# Patient Record
Sex: MALE | Race: WHITE | HISPANIC OR LATINO | Employment: OTHER | URBAN - METROPOLITAN AREA
[De-identification: names, ages, dates, MRNs, and addresses within clinical notes are randomized per-mention and may not be internally consistent; named-entity substitution may affect disease eponyms.]

---

## 2017-05-16 ENCOUNTER — TELEPHONE (OUTPATIENT)
Dept: FAMILY MEDICINE | Facility: CLINIC | Age: 67
End: 2017-05-16

## 2017-05-31 RX ORDER — FLUTICASONE PROPIONATE 50 MCG
SPRAY, SUSPENSION (ML) NASAL
Qty: 3 BOTTLE | Refills: 3 | Status: ON HOLD | OUTPATIENT
Start: 2017-05-31 | End: 2022-01-14 | Stop reason: HOSPADM

## 2017-05-31 RX ORDER — METFORMIN HYDROCHLORIDE 500 MG/1
1 TABLET, EXTENDED RELEASE ORAL DAILY
COMMUNITY
Start: 2017-03-16 | End: 2018-03-22 | Stop reason: SDUPTHER

## 2017-05-31 RX ORDER — ASPIRIN 81 MG/1
81 TABLET ORAL DAILY
COMMUNITY
End: 2022-06-10

## 2017-07-03 ENCOUNTER — OFFICE VISIT (OUTPATIENT)
Dept: FAMILY MEDICINE | Facility: CLINIC | Age: 67
End: 2017-07-03
Payer: MEDICARE

## 2017-07-03 ENCOUNTER — TELEPHONE (OUTPATIENT)
Dept: FAMILY MEDICINE | Facility: CLINIC | Age: 67
End: 2017-07-03

## 2017-07-03 VITALS
BODY MASS INDEX: 29.57 KG/M2 | SYSTOLIC BLOOD PRESSURE: 118 MMHG | WEIGHT: 184 LBS | DIASTOLIC BLOOD PRESSURE: 74 MMHG | HEIGHT: 66 IN | HEART RATE: 59 BPM

## 2017-07-03 DIAGNOSIS — M25.561 ACUTE PAIN OF RIGHT KNEE: Primary | ICD-10-CM

## 2017-07-03 PROBLEM — Z78.9 NON-SMOKER: Status: ACTIVE | Noted: 2017-07-03

## 2017-07-03 PROBLEM — I48.0 PAROXYSMAL ATRIAL FIBRILLATION: Status: ACTIVE | Noted: 2017-07-03

## 2017-07-03 PROBLEM — E11.9 TYPE 2 DIABETES MELLITUS: Status: ACTIVE | Noted: 2017-07-03

## 2017-07-03 PROBLEM — E66.3 OVERWEIGHT: Status: ACTIVE | Noted: 2017-07-03

## 2017-07-03 PROBLEM — D22.9 MELANOCYTIC NEVUS: Status: ACTIVE | Noted: 2017-07-03

## 2017-07-03 PROCEDURE — 96372 THER/PROPH/DIAG INJ SC/IM: CPT | Mod: ,,, | Performed by: INTERNAL MEDICINE

## 2017-07-03 PROCEDURE — 99213 OFFICE O/P EST LOW 20 MIN: CPT | Mod: 25,,, | Performed by: INTERNAL MEDICINE

## 2017-07-03 RX ORDER — DEXAMETHASONE SODIUM PHOSPHATE 4 MG/ML
4 INJECTION, SOLUTION INTRA-ARTICULAR; INTRALESIONAL; INTRAMUSCULAR; INTRAVENOUS; SOFT TISSUE
Status: COMPLETED | OUTPATIENT
Start: 2017-07-03 | End: 2017-07-03

## 2017-07-03 RX ORDER — LIDOCAINE HYDROCHLORIDE 10 MG/ML
1 INJECTION, SOLUTION EPIDURAL; INFILTRATION; INTRACAUDAL; PERINEURAL
Status: COMPLETED | OUTPATIENT
Start: 2017-07-03 | End: 2017-07-03

## 2017-07-03 RX ORDER — NATEGLINIDE 60 MG/1
60 TABLET ORAL
COMMUNITY
End: 2018-05-05 | Stop reason: SDUPTHER

## 2017-07-03 RX ADMIN — LIDOCAINE HYDROCHLORIDE 10 MG: 10 INJECTION, SOLUTION EPIDURAL; INFILTRATION; INTRACAUDAL; PERINEURAL at 11:07

## 2017-07-03 RX ADMIN — DEXAMETHASONE SODIUM PHOSPHATE 4 MG: 4 INJECTION, SOLUTION INTRA-ARTICULAR; INTRALESIONAL; INTRAMUSCULAR; INTRAVENOUS; SOFT TISSUE at 11:07

## 2017-07-03 NOTE — TELEPHONE ENCOUNTER
----- Message from Maico Patterson MD sent at 7/3/2017  4:47 PM CDT -----  Notify patient that x-ray of right knee does not show any evidence of significant arthritis, fracture or dislocation. How is his knee doing after the injection?

## 2017-07-03 NOTE — PROGRESS NOTES
"Subjective:       Patient ID: Nicolás Batres is a 66 y.o. male.    Chief Complaint: Knee Pain ([id number] )    Knee Pain    The incident occurred more than 1 week ago. There was no injury mechanism. The pain is present in the right leg. The quality of the pain is described as shooting and stabbing. The pain is at a severity of 6/10. The pain is moderate. The pain has been constant since onset. Associated symptoms include an inability to bear weight. Pertinent negatives include no numbness.       Past Medical History:   Diagnosis Date    Diabetes mellitus type I     Hypertension      Social History     Social History    Marital status:      Spouse name: N/A    Number of children: N/A    Years of education: N/A     Occupational History    Not on file.     Social History Main Topics    Smoking status: Never Smoker    Smokeless tobacco: Never Used    Alcohol use Yes    Drug use: No    Sexual activity: Yes     Partners: Female     Other Topics Concern    Not on file     Social History Narrative    No narrative on file     History reviewed. No pertinent surgical history.  Family History   Problem Relation Age of Onset    No Known Problems Mother     Diabetes Father     Heart disease Father     Hypertension Father        Review of Systems   Constitutional: Negative for activity change and appetite change.   Respiratory: Negative for apnea, choking and chest tightness.    Cardiovascular: Negative for chest pain and leg swelling.   Gastrointestinal: Negative for abdominal distention.   Genitourinary: Negative for difficulty urinating, dysuria and enuresis.   Musculoskeletal: Positive for arthralgias (rt knee pain medially). Negative for joint swelling.   Neurological: Negative for dizziness, tremors and numbness.       Objective:     Vitals:    07/03/17 1054   BP: 118/74   Pulse: (!) 59   Weight: 83.5 kg (184 lb)   Height: 5' 6" (1.676 m)     Physical Exam   Constitutional: He appears well-developed and " well-nourished. No distress.   HENT:   Head: Normocephalic and atraumatic.   Cardiovascular: Normal rate and regular rhythm.    Pulmonary/Chest: Effort normal and breath sounds normal.   Abdominal: Soft. Normal appearance. There is no tenderness.   Musculoskeletal:        Right shoulder: He exhibits tenderness, bony tenderness and pain. He exhibits no swelling, no effusion and no deformity.        Legs:    there is no evidence of infection, swelling or cellulitis in the joint. Patient does not have a history of gout. Patient has been advised that the steroid injection may raise his blood sugars and he needs to be careful.  Assessment:       1. Acute pain of right knee         Plan:       Knee Arthrocentesis with Injection Procedure Note    Pre-operative Diagnosis: right knee    Post-operative Diagnosis: same    Indications: Unexplained monoarthritis    Anesthesia: Lidocaine 1% without epinephrine without added sodium bicarbonate    Procedure Details     Verbal consent was obtained for the procedure. The joint was prepped with alcoholBetadine and a small wheel of anesthetic was injected into the subcutaneous tissue. A 23 gauge needle was medial  approach. 0.0 ml of fluid was removed from the joint .( No fluid )5 ml 1% lidocaine and 5 ml of dexamethasone was then injected into the joint through the same needle. The needle was removed and the area cleansed and dressed.    Complications:  None; patient tolerated the procedure well.

## 2017-07-03 NOTE — PATIENT INSTRUCTIONS
Knee Pain  Knee pain is very common. Its especially common in active people who put a lot of pressure on their knees, like runners. It affects women more often than men.  Your kneecap (patella) is a thick, round bone. It covers and protects the front portion of your knee joint. It moves along a groove in your thighbone (femur) as part of the patellofemoral joint. A layer of cartilage surrounds the underside of your kneecap. This layer protects it from grinding against your femur.  When this cartilage softens and breaks down, it can cause knee pain. This is partly because of repetitive stress. The stress irritates the lining of the joint. This causes pain in the underlying bone.  What causes knee pain?  Many things can cause knee pain. You may have more than one cause. Some of these include:  · Overuse of the knee joint  · The kneecap doesnt line up with the tissue around it  · Damage to small nerves in the area  · Damage to the ligament-like structure that holds the kneecap in place (retinaculum)  · Breakdown of the bone under the cartilage  · Swelling in the soft tissues around the kneecap  · Injury  You might be more likely to have knee pain if you:  · Exercise a lot  · Recently increased the intensity of your workouts  · Have a body mass index (BMI) greater than 25  · Have poor alignment of your kneecap  · Walk with your feet turned overly outward or inward  · Have weakness in surrounding muscle groups (inner quad or hip adductor muscles)  · Have too much tightness in surrounding muscle groups (hamstrings or iliotibial band)  · Have a recent history of injury to the area  · Are female  Symptoms of knee pain  This type of knee pain is a dull, aching pain in the front of the knee in the area under and around the kneecap. This pain may start quickly or slowly. Your pain might be worse when you squat, run, or sit for a long time. You might also sometimes feel like your knee is giving out. You may have symptoms in  one or both of your knees.  Diagnosing knee pain  Your health care provider will ask about your medical history and your symptoms. Be sure to describe any activities that make your knee pain worse. He or she will look at your knee. This will include tests of your range of motion, strength, and areas of pain of your knee. Your knee alignment will be checked.  Your health care provider will need to rule out other causes of your knee pain, such as arthritis. You may need an imaging test, such as an X-ray or MRI.  Treatment for knee pain  Treatments that can help ease your symptoms may include:  · Avoiding activities for a while that make your pain worse, returning to activity over time  · Icing the outside of your knee when it causes you pain  · Taking over-the-counter pain medicine  · Wearing a knee brace or taping your knee to support it  · Wearing special shoe inserts to help keep your feet in the proper alignment  · Doing special exercises to stretch and strengthen the muscles around your hip and your knee  These steps help most people manage knee pain. But some cases of knee pain need to be treated with surgery. You may need surgery right away. Or you may need it later if other treatments dont work. Your health care provider may refer you to an orthopedic surgeon. He or she will talk with you about your choices.  Preventing knee pain  Losing weight and correcting excess muscle tightness or muscle weakness may help lower your risk.  In some cases, you can prevent knee pain. To help prevent a flare-up of knee pain, you do these things:  · Regularly do all the exercises your doctor or physical therapist advises  · Support your knee as advised by your doctor or physical therapist  · Increase training gradually, and ease up on training when needed  · Have an expert check your gait for running or other sporting activities  · Stretch properly before and after exercise  · Replace your running shoes regularly  · Lose  excess weight     When to call your health care provider  Call your health care provider right away if:  · Your symptoms dont get better after a few weeks of treatment  · You have any new symptoms   Date Last Reviewed: 3/19/2015  © 2216-8593 [employer]. [address] [place] [address], [place] [address]. All rights reserved. This information is not intended as a substitute for professional medical care. Always follow your healthcare professional's instructions.

## 2017-07-05 ENCOUNTER — TELEPHONE (OUTPATIENT)
Dept: FAMILY MEDICINE | Facility: CLINIC | Age: 67
End: 2017-07-05

## 2017-07-14 LAB
ALBUMIN SERPL-MCNC: 4.4 G/DL (ref 3.6–4.8)
ALBUMIN/CREAT UR: 50.1 MG/G CREAT (ref 0–30)
ALBUMIN/GLOB SERPL: 2 {RATIO} (ref 1.2–2.2)
ALP SERPL-CCNC: 71 IU/L (ref 39–117)
ALT SERPL-CCNC: 38 IU/L (ref 0–32)
AMBIG ABBREV CMP 14 DEFAULT: NORMAL
AMBIG ABBREV LP DEFAULT: NORMAL
AST SERPL-CCNC: 25 IU/L (ref 0–40)
BILIRUB SERPL-MCNC: 0.9 MG/DL (ref 0–1.2)
BUN SERPL-MCNC: 12 MG/DL (ref 8–27)
BUN/CREAT SERPL: 11 (ref 12–28)
CALCIUM SERPL-MCNC: 9.3 MG/DL (ref 8.7–10.3)
CHLORIDE SERPL-SCNC: 101 MMOL/L (ref 96–106)
CHOLEST SERPL-MCNC: 163 MG/DL (ref 100–199)
CO2 SERPL-SCNC: 18 MMOL/L (ref 18–29)
CREAT SERPL-MCNC: 1.08 MG/DL (ref 0.57–1)
CREAT UR-MCNC: 140.4 MG/DL
GLOBULIN SER CALC-MCNC: 2.2 G/DL (ref 1.5–4.5)
GLUCOSE SERPL-MCNC: 146 MG/DL (ref 65–99)
HBA1C MFR BLD: 7.8 % (ref 4.8–5.6)
HCV AB S/CO SERPL IA: 1.1 S/CO RATIO (ref 0–0.9)
HDLC SERPL-MCNC: 37 MG/DL
LDLC SERPL CALC-MCNC: 87 MG/DL (ref 0–99)
MICROALBUMIN UR-MCNC: 70.4 UG/ML
POTASSIUM SERPL-SCNC: 4.3 MMOL/L (ref 3.5–5.2)
PROT SERPL-MCNC: 6.6 G/DL (ref 6–8.5)
SODIUM SERPL-SCNC: 141 MMOL/L (ref 134–144)
TRIGL SERPL-MCNC: 194 MG/DL (ref 0–149)
VLDLC SERPL CALC-MCNC: 39 MG/DL (ref 5–40)

## 2017-07-20 ENCOUNTER — OFFICE VISIT (OUTPATIENT)
Dept: FAMILY MEDICINE | Facility: CLINIC | Age: 67
End: 2017-07-20
Payer: MEDICARE

## 2017-07-20 VITALS
HEART RATE: 77 BPM | BODY MASS INDEX: 29.57 KG/M2 | SYSTOLIC BLOOD PRESSURE: 128 MMHG | DIASTOLIC BLOOD PRESSURE: 76 MMHG | HEIGHT: 66 IN | WEIGHT: 184 LBS

## 2017-07-20 DIAGNOSIS — E11.618 TYPE 2 DIABETES MELLITUS WITH OTHER DIABETIC ARTHROPATHY, WITH LONG-TERM CURRENT USE OF INSULIN: ICD-10-CM

## 2017-07-20 DIAGNOSIS — R76.8 POSITIVE HEPATITIS C ANTIBODY TEST: ICD-10-CM

## 2017-07-20 DIAGNOSIS — I10 BENIGN HYPERTENSION: Primary | ICD-10-CM

## 2017-07-20 DIAGNOSIS — Z79.4 TYPE 2 DIABETES MELLITUS WITH OTHER DIABETIC ARTHROPATHY, WITH LONG-TERM CURRENT USE OF INSULIN: ICD-10-CM

## 2017-07-20 DIAGNOSIS — Z11.59 NEED FOR HEPATITIS C SCREENING TEST: ICD-10-CM

## 2017-07-20 PROCEDURE — 4010F ACE/ARB THERAPY RXD/TAKEN: CPT | Mod: ,,, | Performed by: INTERNAL MEDICINE

## 2017-07-20 PROCEDURE — 1159F MED LIST DOCD IN RCRD: CPT | Mod: ,,, | Performed by: INTERNAL MEDICINE

## 2017-07-20 PROCEDURE — 1126F AMNT PAIN NOTED NONE PRSNT: CPT | Mod: ,,, | Performed by: INTERNAL MEDICINE

## 2017-07-20 PROCEDURE — 99214 OFFICE O/P EST MOD 30 MIN: CPT | Mod: ,,, | Performed by: INTERNAL MEDICINE

## 2017-07-20 NOTE — PATIENT INSTRUCTIONS
Using a Blood Sugar Log    You have diabetes. This means your body has trouble regulating a sugar called glucose. To help manage your diabetes, youll need to check your blood sugar level as directed by your healthcare provider. Keeping a log of your blood sugar levels will help you track your blood sugar readings. Its a simple and easy way to see how well you are controlling your diabetes.  Checking your blood sugar level  You can check your blood sugar level with a blood glucose meter. Youll first prick the side of your finger with a tiny lancet to draw a tiny drop of blood onto the test strip. Some glucose meters let you use another place on your body to test. But these other places should not be used in some cases as they may be inaccurate. Follow the instructions for your glucose meter. And talk with your healthcare provider before doing the test on other places.  The strip goes into the meter first, then a drop of blood is placed on the tip of the strip. The meter then shows a reading that tells you the level of your blood sugar. Your readings should be in your target range as often as possible. This means not too high or too low. Staying in this range helps lower your risk for complications. Your healthcare provider will help you figure out the target range that is best for you.  Tracking your readings  Every time you check your blood sugar, use your log to keep track of your readings. Your meter will also probably have a memory feature that your healthcare provider can check at your next visit. You may be advised by your healthcare provider to check your blood sugar in the morning, at bedtime, and before and after meals. Be sure to write down all of your numbers. Also use your log to record things that might have affected your blood sugar. Some examples include being sick, certain medicines, being physically active, feeling stressed, or skipping meals.   Lessons learned from your readings  Tracking your  blood sugar readings helps you see patterns. These patterns tell you how your actions affect your blood sugar. For instance, you may have higher numbers after eating certain foods or lower numbers after exercise. They just help you understand how to stay in your target range more often, so that your diabetes remains in good control.  Sharing your log with your healthcare team  Bring your blood sugar log and glucose meter with you to all of your healthcare appointments. This can help your healthcare team make changes to your treatment plan, if needed. This may involve making changes in what you eat, what medicines you take, or how much you exercise.  To learn more  The resources below can help you learn more:  · American Diabetes Association 335-669-2906 www.diabetes.org  · [employer] 959-389-2034 www.lighthouse.org  · National Eye Reading 830-675-8400 www.nei.nih.gov  · [employer] 793-366-3828 www.hormone.org  Date Last Reviewed: 5/1/2016  © 3509-0461 [employer]. [address] [place] [address], [place] [address]. All rights reserved. This information is not intended as a substitute for professional medical care. Always follow your healthcare professional's instructions.

## 2017-07-20 NOTE — PROGRESS NOTES
Subjective:       Patient ID: Nicolás Batres is a 67 y.o. male.    Chief Complaint: Diabetes (needs eye exam and statin meds per [facility]) and Hypertension    Pt comes for follow up.Recently got steroid injections in rt knee with no relief after initial 3 days of improvement      Diabetes   He presents for his follow-up diabetic visit. He has type 2 diabetes mellitus. No MedicAlert identification noted. His disease course has been fluctuating. Pertinent negatives for hypoglycemia include no confusion, dizziness, mood changes, nervousness/anxiousness, pallor, seizures or speech difficulty. Pertinent negatives for diabetes include no chest pain, no fatigue, no foot ulcerations, no polydipsia, no polyphagia, no polyuria, no visual change, no weakness and no weight loss. Symptoms are worsening. Risk factors for coronary artery disease include dyslipidemia, diabetes mellitus, hypertension, male sex and sedentary lifestyle. He is compliant with treatment most of the time. He is following a generally healthy diet. He has not had a previous visit with a dietitian. He participates in exercise intermittently. His breakfast blood glucose range is generally 140-180 mg/dl. His lunch blood glucose range is generally 180-200 mg/dl. An ACE inhibitor/angiotensin II receptor blocker is being taken. He does not see a podiatrist.Eye exam is not current.   Hypertension   This is a chronic problem. The current episode started more than 1 year ago. The problem is controlled. Pertinent negatives include no chest pain, neck pain, palpitations, PND or shortness of breath. There are no associated agents to hypertension. Risk factors for coronary artery disease include diabetes mellitus and male gender. Past treatments include ACE inhibitors. The current treatment provides moderate improvement. Compliance problems include diet, exercise and psychosocial issues (cost of meds).  There is no history of pheochromocytoma.       Past Medical History:    Diagnosis Date    Diabetes mellitus type I     Hypertension      Social History     Social History    Marital status:      Spouse name: N/A    Number of children: N/A    Years of education: N/A     Occupational History    Not on file.     Social History Main Topics    Smoking status: Never Smoker    Smokeless tobacco: Never Used    Alcohol use Yes    Drug use: No    Sexual activity: Yes     Partners: Female     Other Topics Concern    Not on file     Social History Narrative    No narrative on file     History reviewed. No pertinent surgical history.  Family History   Problem Relation Age of Onset    No Known Problems Mother     Diabetes Father     Heart disease Father     Hypertension Father        Review of Systems   Constitutional: Negative for activity change, appetite change, fatigue, unexpected weight change and weight loss.   HENT: Negative for congestion, sneezing and trouble swallowing.    Eyes: Negative for pain and visual disturbance.   Respiratory: Negative for cough, chest tightness and shortness of breath.    Cardiovascular: Negative for chest pain, palpitations, leg swelling and PND.        HTN +ve but Normal Lipids   Gastrointestinal: Negative for abdominal distention, abdominal pain, blood in stool, constipation and diarrhea.   Endocrine: Negative for cold intolerance, heat intolerance, polydipsia, polyphagia and polyuria.        DM   Genitourinary: Negative for dysuria.   Musculoskeletal: Positive for arthralgias (rt knee pain). Negative for back pain, gait problem and neck pain.   Skin: Negative for pallor, rash and wound.   Allergic/Immunologic: Negative for environmental allergies, food allergies and immunocompromised state.   Neurological: Negative for dizziness, seizures, speech difficulty, weakness, light-headedness and numbness.   Hematological: Negative for adenopathy. Does not bruise/bleed easily.   Psychiatric/Behavioral: Negative for agitation, behavioral  "problems and confusion. The patient is not nervous/anxious.        Objective:       Vitals:    07/20/17 1356   BP: 128/76   Pulse: 77   Weight: 83.5 kg (184 lb)   Height: 5' 6" (1.676 m)     Physical Exam   Constitutional: He is oriented to person, place, and time. He appears well-developed.   HENT:   Head: Normocephalic and atraumatic.   Nose: Nose normal.   Mouth/Throat: Oropharynx is clear and moist. No oropharyngeal exudate.   Eyes: Conjunctivae and EOM are normal.   Neck: Normal range of motion. Neck supple. No JVD present. No tracheal deviation present. No thyromegaly present.   Cardiovascular: Normal rate, regular rhythm and normal heart sounds.  Exam reveals no gallop and no friction rub.    No murmur heard.  Pulses:       Dorsalis pedis pulses are 1+ on the left side.        Posterior tibial pulses are 1+ on the left side.   Pulmonary/Chest: Effort normal and breath sounds normal. No respiratory distress. He has no wheezes. He has no rales.   Abdominal: Soft. Bowel sounds are normal. He exhibits no distension. There is no tenderness.   Musculoskeletal: Normal range of motion.        Right foot: There is no deformity.   Feet:   Right Foot:   Protective Sensation: 4 sites tested. 4 sites sensed.   Skin Integrity: Negative for ulcer or blister.   Left Foot:   Protective Sensation: 4 sites tested. 4 sites sensed.   Skin Integrity: Negative for ulcer or blister.   Neurological: He is alert and oriented to person, place, and time. He has normal reflexes.   Skin: Skin is warm and dry.   Psychiatric: He has a normal mood and affect.   Nursing note and vitals reviewed.      Assessment:       1. Benign hypertension    2. Type 2 diabetes mellitus with other diabetic arthropathy, with long-term current use of insulin    3. Need for hepatitis C screening test    4. Positive hepatitis C antibody test         Plan:           Benign hypertension    Type 2 diabetes mellitus with other diabetic arthropathy, with long-term " current use of insulin  -     Ambulatory referral to Ophthalmology  -     Hemoglobin A1c; Future; Expected date: 07/20/2017    Need for hepatitis C screening test  -     Cancel: Hepatitis C antibody; Future; Expected date: 07/20/2017    Positive hepatitis C antibody test  -     Hepatitis C RNA, quantitative, PCR; Future; Expected date: 07/20/2017  -     Hepatitis C Viral RNA Genotype; Future; Expected date: 07/20/2017    Advised Mr. Batres to monitor Blood sugars at home and record them.  Exercise, watch diet and loose weight.  keep a close eye on feet and keep them clean. Annual eye examination. Annual influenza vaccine.  Monitor HgbA1c every 3 to 6 months. Monitor urine microalbumin every year.keep LDL less than 100. Monitor blood pressure and target blood pressure 120/70.

## 2017-07-30 PROBLEM — K76.0 FATTY LIVER DISEASE, NONALCOHOLIC: Status: ACTIVE | Noted: 2017-07-30

## 2017-08-07 ENCOUNTER — OFFICE VISIT (OUTPATIENT)
Dept: ORTHOPEDICS | Facility: CLINIC | Age: 67
End: 2017-08-07
Payer: MEDICARE

## 2017-08-07 VITALS
BODY MASS INDEX: 29.57 KG/M2 | SYSTOLIC BLOOD PRESSURE: 152 MMHG | HEIGHT: 66 IN | WEIGHT: 184 LBS | DIASTOLIC BLOOD PRESSURE: 78 MMHG | HEART RATE: 90 BPM

## 2017-08-07 DIAGNOSIS — G89.29 CHRONIC PAIN OF RIGHT KNEE: Primary | ICD-10-CM

## 2017-08-07 DIAGNOSIS — M25.561 CHRONIC PAIN OF RIGHT KNEE: Primary | ICD-10-CM

## 2017-08-07 PROCEDURE — 3078F DIAST BP <80 MM HG: CPT | Mod: ,,, | Performed by: ORTHOPAEDIC SURGERY

## 2017-08-07 PROCEDURE — 1159F MED LIST DOCD IN RCRD: CPT | Mod: ,,, | Performed by: ORTHOPAEDIC SURGERY

## 2017-08-07 PROCEDURE — 99203 OFFICE O/P NEW LOW 30 MIN: CPT | Mod: ,,, | Performed by: ORTHOPAEDIC SURGERY

## 2017-08-07 PROCEDURE — 3077F SYST BP >= 140 MM HG: CPT | Mod: ,,, | Performed by: ORTHOPAEDIC SURGERY

## 2017-08-07 PROCEDURE — 3008F BODY MASS INDEX DOCD: CPT | Mod: ,,, | Performed by: ORTHOPAEDIC SURGERY

## 2017-08-07 PROCEDURE — 1125F AMNT PAIN NOTED PAIN PRSNT: CPT | Mod: ,,, | Performed by: ORTHOPAEDIC SURGERY

## 2017-08-07 RX ORDER — METOPROLOL TARTRATE 25 MG/1
TABLET, FILM COATED ORAL
COMMUNITY
Start: 2017-07-27 | End: 2019-01-30 | Stop reason: SDUPTHER

## 2017-08-07 NOTE — PROGRESS NOTES
Past Medical History:   Diagnosis Date    Diabetes mellitus type I     Hypertension        History reviewed. No pertinent surgical history.    Current Outpatient Prescriptions   Medication Sig    aspirin (ECOTRIN) 81 MG EC tablet Take 1 tablet by mouth Daily.    blood sugar diagnostic (ACCU-CHEK ACTIVE TEST) Strp Inject 1 strip into the skin Daily.    cyanocobalamin (VITAMIN B-12) 500 MCG tablet Take 500 mcg by mouth once daily.    ELIQUIS 5 mg Tab Take 5 mg by mouth 2 (two) times daily.    esomeprazole (NEXIUM) 20 MG capsule Take 20 mg by mouth before breakfast.    fluticasone (FLONASE) 50 mcg/actuation nasal spray USE 2 SPRAYS TO EACH NOSTRIL ONCE A DAY    LANTUS SOLOSTAR 100 unit/mL (3 mL) InPn pen INJECT 18 UNITS SUBCUTANEOUSLY EVERY AM & TITRATE DOSE UP BY 1-2 UNITS IF FBS>140 FOR 2 DAYS IN ROW    losartan (COZAAR) 50 MG tablet     metformin (GLUCOPHAGE-XR) 500 MG 24 hr tablet Take 1 tablet by mouth Daily.    metoprolol tartrate (LOPRESSOR) 25 MG tablet     nateglinide (STARLIX) 60 MG tablet Take 60 mg by mouth 3 (three) times daily before meals.    valacyclovir (VALTREX) 500 MG tablet Take 500 mg by mouth once daily.     No current facility-administered medications for this visit.        Review of patient's allergies indicates:   Allergen Reactions    Pollen extracts        Family History   Problem Relation Age of Onset    No Known Problems Mother     Diabetes Father     Heart disease Father     Hypertension Father        Social History     Social History    Marital status:      Spouse name: N/A    Number of children: N/A    Years of education: N/A     Occupational History    Not on file.     Social History Main Topics    Smoking status: Never Smoker    Smokeless tobacco: Never Used    Alcohol use Yes    Drug use: No    Sexual activity: Yes     Partners: Female     Other Topics Concern    Not on file     Social History Narrative    No narrative on file       Chief  "Complaint:   Chief Complaint   Patient presents with    Right Knee - Pain, Swelling     His right knee has been painful since sometime in 6/2017 with no known injury.  He had previous X-[facility] at [facility].       Consulting Physician: No ref. provider found    History of Present Illness:    This is a 67 y.o. year old male who complains of Patient has approximately a 3 month history of increasing right knee pain is associated and increased pain with running his pain is 7 out of 10      ROS    Examination:    Vital Signs:    Vitals:    08/07/17 1506   BP: (!) 152/78   Pulse: 90   Weight: 83.5 kg (184 lb)   Height: 5' 6" (1.676 m)   PainSc:   1       This a well-developed, well nourished patient in no acute distress.    Alert and oriented and cooperative to examination.       Physical Exam: Right Knee Exam    Gait   Normal    Skin  Rash:   None  Scars:   None    Inspection  Erythema:  None  Bruising:  None  Effusion:  None  Masses:  None  Lymphadenopathy: None    Range of Motion: 0 to 130° with pain    Medial Joint : positive  Lateral Joint : negative    Patellofemoral Tenderness: None  Patellofemoral Crepitus: None    Lachman:  Normal  Anterior Drawer: Normal  Posterior Drawer: Normal    Lina's:  Positive with medial knee pain  Apley's:  egative    Varus Stress:  Stable  Valgus Stress:  Stable    Strength:  5/5    Pulses:  Palpable distal    Sensation:  Intact          Imaging: X-rays ordered and reviewed today x-ray today of the right knee is negative     Assessment: rule out medial meniscal tear right knee    Plan:  We'll get MRI of the right knee      DISCLAIMER: This note may have been dictated using voice recognition software and may contain grammatical errors.     NOTE: Consult report sent to referring provider via [employer] EMR.  "

## 2017-08-14 ENCOUNTER — OFFICE VISIT (OUTPATIENT)
Dept: ORTHOPEDICS | Facility: CLINIC | Age: 67
End: 2017-08-14
Payer: MEDICARE

## 2017-08-14 VITALS
WEIGHT: 184 LBS | BODY MASS INDEX: 29.57 KG/M2 | DIASTOLIC BLOOD PRESSURE: 99 MMHG | HEIGHT: 66 IN | HEART RATE: 85 BPM | SYSTOLIC BLOOD PRESSURE: 157 MMHG

## 2017-08-14 DIAGNOSIS — G89.29 CHRONIC PAIN OF RIGHT KNEE: Primary | ICD-10-CM

## 2017-08-14 DIAGNOSIS — M25.561 CHRONIC PAIN OF RIGHT KNEE: Primary | ICD-10-CM

## 2017-08-14 PROCEDURE — 1159F MED LIST DOCD IN RCRD: CPT | Mod: ,,, | Performed by: ORTHOPAEDIC SURGERY

## 2017-08-14 PROCEDURE — 1125F AMNT PAIN NOTED PAIN PRSNT: CPT | Mod: ,,, | Performed by: ORTHOPAEDIC SURGERY

## 2017-08-14 PROCEDURE — 3077F SYST BP >= 140 MM HG: CPT | Mod: ,,, | Performed by: ORTHOPAEDIC SURGERY

## 2017-08-14 PROCEDURE — 3080F DIAST BP >= 90 MM HG: CPT | Mod: ,,, | Performed by: ORTHOPAEDIC SURGERY

## 2017-08-14 PROCEDURE — 3008F BODY MASS INDEX DOCD: CPT | Mod: ,,, | Performed by: ORTHOPAEDIC SURGERY

## 2017-08-14 PROCEDURE — 99213 OFFICE O/P EST LOW 20 MIN: CPT | Mod: ,,, | Performed by: ORTHOPAEDIC SURGERY

## 2017-08-14 NOTE — PROGRESS NOTES
Past Medical History:   Diagnosis Date    Diabetes mellitus type I     Hypertension        History reviewed. No pertinent surgical history.    Current Outpatient Prescriptions   Medication Sig    aspirin (ECOTRIN) 81 MG EC tablet Take 1 tablet by mouth Daily.    blood sugar diagnostic (ACCU-CHEK ACTIVE TEST) Strp Inject 1 strip into the skin Daily.    cyanocobalamin (VITAMIN B-12) 500 MCG tablet Take 500 mcg by mouth once daily.    ELIQUIS 5 mg Tab Take 5 mg by mouth 2 (two) times daily.    esomeprazole (NEXIUM) 20 MG capsule Take 20 mg by mouth before breakfast.    fluticasone (FLONASE) 50 mcg/actuation nasal spray USE 2 SPRAYS TO EACH NOSTRIL ONCE A DAY    LANTUS SOLOSTAR 100 unit/mL (3 mL) InPn pen INJECT 18 UNITS SUBCUTANEOUSLY EVERY AM & TITRATE DOSE UP BY 1-2 UNITS IF FBS>140 FOR 2 DAYS IN ROW    losartan (COZAAR) 50 MG tablet     metformin (GLUCOPHAGE-XR) 500 MG 24 hr tablet Take 1 tablet by mouth Daily.    metoprolol tartrate (LOPRESSOR) 25 MG tablet     nateglinide (STARLIX) 60 MG tablet Take 60 mg by mouth 3 (three) times daily before meals.    valacyclovir (VALTREX) 500 MG tablet Take 500 mg by mouth once daily.     No current facility-administered medications for this visit.        Review of patient's allergies indicates:   Allergen Reactions    Pollen extracts        Family History   Problem Relation Age of Onset    No Known Problems Mother     Diabetes Father     Heart disease Father     Hypertension Father        Social History     Social History    Marital status:      Spouse name: N/A    Number of children: N/A    Years of education: N/A     Occupational History    Not on file.     Social History Main Topics    Smoking status: Never Smoker    Smokeless tobacco: Never Used    Alcohol use Yes    Drug use: No    Sexual activity: Yes     Partners: Female     Other Topics Concern    Not on file     Social History Narrative    No narrative on file       Chief  "Complaint:   Chief Complaint   Patient presents with    Right Knee - Follow-up     He is here to review his MRI results that he had on the 08/9/2017.  The results are in the chart under imaging. His right knee has been painful since sometime in 6/2017 with no known injury.        Consulting Physician: No ref. provider found    History of Present Illness:    This is a 67 y.o. year old male who complains of patient returns today with results of his MRI to his right knee he states his pain has improved dramatically since his last visit      ROS    Examination:    Vital Signs:    Vitals:    08/14/17 1428   BP: (!) 157/99   Pulse: 85   Weight: 83.5 kg (184 lb)   Height: 5' 6" (1.676 m)   PainSc:   1       This a well-developed, well nourished patient in no acute distress.    Alert and oriented and cooperative to examination.       Physical Exam: Right Knee Exam    Gait:   Normal    Skin  Rash:   None  Scars:   None    Inspection  Erythema:  None  Bruising:  None  Effusion:  None  Masses:  None  Lymphadenopathy: None    Range of Motion: 0 to 130°    Medial Joint : None  Lateral Joint : None    Patellofemoral Tenderness: None  Patellofemoral Crepitus: None    Lachman:  Normal  Anterior Drawer: Normal  Posterior Drawer: Normal    Lina's:  Negative  Apley's:  Negative    Varus Stress:  Stable  Valgus Stress:  Stable    Strength:  5/5    Pulses:  Palpable distal    Sensation:  Intact          Imaging: X-rays ordered and reviewed today patient has an MRI of the right knee which shows no internal derangement and only shows some mild bone contusion of the medial femoral condyle     Assessment: mild medial femoral condyle bone contusion right knee    Plan:  amber has been told to decrease running and if he does run do it very gradually and increase gradually      DISCLAIMER: This note may have been dictated using voice recognition software and may contain grammatical errors.     NOTE: Consult report " sent to referring provider via [employer].

## 2017-09-21 ENCOUNTER — OFFICE VISIT (OUTPATIENT)
Dept: FAMILY MEDICINE | Facility: CLINIC | Age: 67
End: 2017-09-21
Payer: MEDICARE

## 2017-09-21 VITALS
DIASTOLIC BLOOD PRESSURE: 76 MMHG | HEART RATE: 60 BPM | HEIGHT: 66 IN | BODY MASS INDEX: 28.28 KG/M2 | SYSTOLIC BLOOD PRESSURE: 127 MMHG | WEIGHT: 176 LBS

## 2017-09-21 DIAGNOSIS — Z71.85 IMMUNIZATION COUNSELING: ICD-10-CM

## 2017-09-21 DIAGNOSIS — E78.5 HYPERLIPIDEMIA, UNSPECIFIED HYPERLIPIDEMIA TYPE: ICD-10-CM

## 2017-09-21 DIAGNOSIS — E11.9 TYPE 2 DIABETES MELLITUS WITHOUT COMPLICATION, WITH LONG-TERM CURRENT USE OF INSULIN: Primary | ICD-10-CM

## 2017-09-21 DIAGNOSIS — I10 BENIGN HYPERTENSION: ICD-10-CM

## 2017-09-21 DIAGNOSIS — Z79.4 TYPE 2 DIABETES MELLITUS WITHOUT COMPLICATION, WITH LONG-TERM CURRENT USE OF INSULIN: Primary | ICD-10-CM

## 2017-09-21 PROCEDURE — 99213 OFFICE O/P EST LOW 20 MIN: CPT | Mod: ,,, | Performed by: INTERNAL MEDICINE

## 2017-09-21 PROCEDURE — 3045F PR MOST RECENT HEMOGLOBIN A1C LEVEL 7.0-9.0%: CPT | Mod: ,,, | Performed by: INTERNAL MEDICINE

## 2017-09-21 PROCEDURE — 3078F DIAST BP <80 MM HG: CPT | Mod: ,,, | Performed by: INTERNAL MEDICINE

## 2017-09-21 PROCEDURE — 1126F AMNT PAIN NOTED NONE PRSNT: CPT | Mod: ,,, | Performed by: INTERNAL MEDICINE

## 2017-09-21 PROCEDURE — G0009 ADMIN PNEUMOCOCCAL VACCINE: HCPCS | Mod: ,,, | Performed by: INTERNAL MEDICINE

## 2017-09-21 PROCEDURE — 4010F ACE/ARB THERAPY RXD/TAKEN: CPT | Mod: ,,, | Performed by: INTERNAL MEDICINE

## 2017-09-21 PROCEDURE — 1159F MED LIST DOCD IN RCRD: CPT | Mod: ,,, | Performed by: INTERNAL MEDICINE

## 2017-09-21 PROCEDURE — 90670 PCV13 VACCINE IM: CPT | Mod: ,,, | Performed by: INTERNAL MEDICINE

## 2017-09-21 PROCEDURE — 3074F SYST BP LT 130 MM HG: CPT | Mod: ,,, | Performed by: INTERNAL MEDICINE

## 2017-09-21 PROCEDURE — 3008F BODY MASS INDEX DOCD: CPT | Mod: ,,, | Performed by: INTERNAL MEDICINE

## 2017-09-21 RX ORDER — LOVASTATIN 10 MG/1
10 TABLET ORAL NIGHTLY
Qty: 30 TABLET | Refills: 11 | Status: SHIPPED | OUTPATIENT
Start: 2017-09-21 | End: 2017-10-18 | Stop reason: SDUPTHER

## 2017-09-21 NOTE — PROGRESS NOTES
Subjective:       Patient ID: Nicolás Batres is a 67 y.o. male.    Chief Complaint: Diabetes (needs statin ); Hypertension; and Hyperlipidemia    Diabetes   He presents for his follow-up diabetic visit. He has type 2 diabetes mellitus. No MedicAlert identification noted. His disease course has been fluctuating. Pertinent negatives for hypoglycemia include no confusion, dizziness, nervousness/anxiousness, pallor, seizures or speech difficulty. Pertinent negatives for diabetes include no blurred vision, no chest pain, no fatigue, no polydipsia, no polyphagia, no polyuria, no visual change, no weakness and no weight loss. There are no hypoglycemic complications. Pertinent negatives for diabetic complications include no PVD or retinopathy. Risk factors for coronary artery disease include diabetes mellitus, dyslipidemia, male sex and sedentary lifestyle. Current diabetic treatment includes insulin injections and oral agent (dual therapy). He is compliant with treatment most of the time. An ACE inhibitor/angiotensin II receptor blocker is being taken. He does not see a podiatrist.Eye exam is not current.   Hypertension   This is a chronic problem. The current episode started more than 1 year ago. The problem is controlled. Pertinent negatives include no blurred vision, chest pain, neck pain, palpitations or shortness of breath. Past treatments include angiotensin blockers and beta blockers. There is no history of PVD or retinopathy. There is no history of chronic renal disease.   Hyperlipidemia   This is a chronic problem. The current episode started more than 1 year ago. He has no history of chronic renal disease, diabetes, obesity or nephrotic syndrome. Pertinent negatives include no chest pain or shortness of breath. He is currently on no antihyperlipidemic treatment.       Past Medical History:   Diagnosis Date    Diabetes mellitus type I     Hypertension      Social History     Social History    Marital status:       Spouse name: N/A    Number of children: N/A    Years of education: N/A     Occupational History    Not on file.     Social History Main Topics    Smoking status: Never Smoker    Smokeless tobacco: Never Used    Alcohol use Yes    Drug use: No    Sexual activity: Yes     Partners: Female     Other Topics Concern    Not on file     Social History Narrative    No narrative on file     History reviewed. No pertinent surgical history.  Family History   Problem Relation Age of Onset    No Known Problems Mother     Diabetes Father     Heart disease Father     Hypertension Father        Review of Systems   Constitutional: Negative for activity change, appetite change, fatigue, unexpected weight change and weight loss.   HENT: Negative for congestion, sneezing and trouble swallowing.    Eyes: Negative for blurred vision, pain and visual disturbance.   Respiratory: Negative for cough, chest tightness and shortness of breath.    Cardiovascular: Negative for chest pain, palpitations and leg swelling.        HTN +ve but Normal Lipids   Gastrointestinal: Negative for abdominal distention, abdominal pain, blood in stool, constipation and diarrhea.   Endocrine: Negative for cold intolerance, heat intolerance, polydipsia, polyphagia and polyuria.        DM   Genitourinary: Negative for dysuria.   Musculoskeletal: Positive for arthralgias (rt knee pain). Negative for back pain, gait problem and neck pain.   Skin: Negative for pallor, rash and wound.   Allergic/Immunologic: Negative for environmental allergies, food allergies and immunocompromised state.   Neurological: Negative for dizziness, seizures, speech difficulty, weakness, light-headedness and numbness.   Hematological: Negative for adenopathy. Does not bruise/bleed easily.   Psychiatric/Behavioral: Negative for agitation, behavioral problems and confusion. The patient is not nervous/anxious.        Objective:       Vitals:    09/21/17 1356   BP:  "127/76   Pulse: 60   Weight: 79.8 kg (176 lb)   Height: 5' 6" (1.676 m)     Physical Exam   Constitutional: He is oriented to person, place, and time. He appears well-developed.   HENT:   Head: Normocephalic and atraumatic.   Nose: Nose normal.   Mouth/Throat: Oropharynx is clear and moist. No oropharyngeal exudate.   Eyes: Conjunctivae and EOM are normal.   Neck: Normal range of motion. Neck supple. No JVD present. No tracheal deviation present. No thyromegaly present.   Cardiovascular: Normal rate, regular rhythm and normal heart sounds.  Exam reveals no gallop and no friction rub.    No murmur heard.  Pulses:       Dorsalis pedis pulses are 1+ on the left side.        Posterior tibial pulses are 1+ on the left side.   Pulmonary/Chest: Effort normal and breath sounds normal. No respiratory distress. He has no wheezes. He has no rales.   Abdominal: Soft. Bowel sounds are normal. He exhibits no distension. There is no tenderness.   Musculoskeletal:        Right foot: There is no deformity.   Feet:   Right Foot:   Protective Sensation: 4 sites tested. 4 sites sensed.   Skin Integrity: Negative for ulcer or blister.   Left Foot:   Protective Sensation: 4 sites tested. 4 sites sensed.   Skin Integrity: Negative for ulcer or blister.   Neurological: He is alert and oriented to person, place, and time. He has normal reflexes.   Skin: Skin is warm and dry.   Psychiatric: He has a normal mood and affect.   Somewhat incongruent   Nursing note and vitals reviewed.      Assessment:       1. Type 2 diabetes mellitus without complication, with long-term current use of insulin    2. Benign hypertension    3. Immunization counseling    4. Hyperlipidemia, unspecified hyperlipidemia type         Plan:           Type 2 diabetes mellitus without complication, with long-term current use of insulin  -     Hemoglobin A1c; Future; Expected date: 09/21/2017    Benign hypertension  -     Basic metabolic panel; Future; Expected date: " 09/21/2017    Immunization counseling  -     (In Office Administered) Pneumococcal Conjugate Vaccine (13 Valent) (IM)    Hyperlipidemia, unspecified hyperlipidemia type  -     ALT (SGPT); Future; Expected date: 09/21/2017  -     lovastatin (MEVACOR) 10 MG tablet; Take 1 tablet (10 mg total) by mouth every evening.  Dispense: 30 tablet; Refill: 11    Patient will get the pneumonia-Prevnar 13 vaccine today.    His blood pressure is stable. His blood sugars does show some fluctuations. Last time he had checked hemoglobin A1c and it was 7.5. Next    I'll see him in about 3-4 months time and check a hemoglobin A1c again at that point and see how it is doing.      Patient's hepatitis C screening test slightly positive but somehow the significance of this is not clear since patient tells me that he had been ultimately checked negative for it. I do not have access to those records.

## 2017-09-21 NOTE — PATIENT INSTRUCTIONS
Prevention Guidelines, Men Ages 65 and Older  Screening tests and vaccines are an important part of managing your health. Health counseling is essential, too. Below are guidelines for these, for men ages 65 and older. Talk with your healthcare provider to make sure youre up-to-date on what you need.  Screening Who needs it How often   Abdominal aortic aneurysm Men ages 65 to 75 who have ever smoked 1 ultrasound   Alcohol misuse All men in this age group At routine exams   Blood pressure All men in this age group Every 2 years if your blood pressure is less than 120/80 mm Hg; yearly if your systolic blood pressure is 120 to 139 mm Hg, or your diastolic blood pressure reading is 80 to 89 mm Hg   Colorectal cancer All men in this age group Flexible sigmoidoscopy every 5 years, or colonoscopy every 10 years, or double-contrast barium enema every 5 years; yearly fecal occult blood test or fecal immunochemical test; or a stool DNA test as often as your healthcare provider advises; talk with your healthcare provider about which tests are best for you and when you no longer need colonoscopies (generally after age 75)   Depression All men in this age group At routine exams   Type 2 diabetes or prediabetes All adults beginning at age 45 and adults without symptoms at any age who are overweight or obese and have 1 or more other risk factors for diabetes At least every 3 years (yearly if your blood sugar has already begun to rise)   Hepatitis C Men at increased risk for infection - talk with your healthcare provider At routine exams   High cholesterol or triglycerides All men in this age group At least every 5 years   HIV Men at increased risk for infection - talk with your healthcare provider At routine exams   Lung cancer Adults ages 55 to 80 who have smoked Yearly screening in smokers with 30 pack-year history of smoking or who quit within 15 years   Obesity All men in this age group At routine exams   Prostate cancer All  men in this age group, talk to healthcare provider about risks and benefits of digital rectal exam (NICOLE) and prostate-specific antigen (PSA) screening1 At routine exams   Syphilis Men at increased risk for infection - talk with your healthcare provider At routine exams   Tuberculosis Men at increased risk for infection - talk with your healthcare provider Ask your healthcare provider   Vision All men in this age group Every 1 to 2 years; if you have a chronic health condition, ask your healthcare provider if you needs exams more often   Vaccine Who needs it How often   Chickenpox (varicella) All men in this age group who have no record of this infection or vaccine 2 doses; second dose should be given at least 4 weeks after the first dose   Hepatitis A Men at increased risk for infection - talk with your healthcare provider 2 doses given at least 6 months apart   Hepatitis B Men at increased risk for infection - talk with your healthcare provider 3 doses over 6 months; second dose should be given 1 month after the first dose; the third dose should be given at least 2 months after the second dose and at least 4 months after the first dose   Haemophilus influenzae Type B (HIB) Men at increased risk for infection - talk with your healthcare provider 1 to 3 doses   Influenza (flu) All men in this age group  Once a year   Meningococcal Men at increased risk for infection - talk with your healthcare provider 1 or more doses   Pneumococcal conjugate vaccine (PCV13) and pneumococcal polysaccharide vaccine (PPSV23) All men in this age group 1 dose of each vaccine   Tetanus/diphtheria/  pertussis (Td/Tdap) booster All men in this age group Td every 10 years, or Tdap if you will have contact with a child younger than 12 months old   Zoster All men in this age group 1 dose   Counseling Who needs it How often   Diet and exercise Men who are overweight or obese When diagnosed, and then at routine exams   Fall prevention (exercise,  vitamin D supplements) All men in this age group At routine exams   Sexually transmitted infection Men at increased risk for infection - talk with your healthcare provider At routine exams   Use of daily aspirin Men ages 45 to 79 at risk for cardiovascular health problems At routine exams   Use of tobacco and the health effects it can cause All men in this age group Every visit   [address] [place] [address] [facility]   Date Last Reviewed: 2/1/2017  © 1701-3819 [employer]. [address] [place] [address], [place] [address]. All rights reserved. This information is not intended as a substitute for professional medical care. Always follow your healthcare professional's instructions.        Exercise to Manage Your Blood Sugar    Being physically active every day can help you manage your blood sugar. Thats because an active lifestyle can improve your bodys ability to use insulin. Daily activity can also help delay or prevent complications of diabetes. And its a great way to relieve stress. If you arent normally active, be sure to consult your healthcare provider before getting started.  How much activity do you need?  If daily activity is new to you, start slow and steady. Begin with 10 minutes of activity each day. Then work up to at least 150 minutes a week of physical activity. Don't let more than 2 days go by without being active. When sitting for long periods of time, get up for short sessions of light activity every 30 minutes  Just move!  You dont have to join a gym or own pricey sports equipment. Just get out and walk. Walking is an aerobic exercise that makes your heart and lungs work hard. It helps your heart and blood vessels. Walking needs only a sturdy pair of sneakers and your own two feet. The more you walk, the easier it gets:  · Schedule time every day to move your feet.  · Make it part of your daily routine.  · Walk with a friend or a group to keep it interesting and fun.  · Try taking  "several short walks during the day to meet your daily activity goal.  A pedometer makes every step count  A pedometer is a small device that keeps track of how many steps you take. You can clip it to your belt (or a strap on your arm or leg) and go about your daily routine. "Smartphones" now also have apps to record your walking. At the end of the day, the pedometer shows the total number of steps you took. Use a pedometer to set daily goals for yourself. For instance, if you walk 4,000 steps a day, try adding 200 more steps each day. Aim for a goal of 7,500. With every step, youre doing a little more to help your body use insulin.   Adding resistance exercise  Resistance exercise (also called strength training), makes muscles stronger. It also helps muscles use insulin better. Ask your healthcare provider whether this type of exercise is right for you. If it is, your healthcare provider can help you work it in to your activity plan.  Staying safe  Being active may cause blood sugar to drop faster than usual. This is especially true if you take medicine to manage your blood sugar. But there are things you can do to help reduce the risk of accidental lows. Keep these tips in mind:  · Always carry identification when you exercise outside your home. Carry a cell phone to use in case of emergency.  · If you can, include friends and family in your activities.  · Wear a medical ID bracelet that says you have diabetes.  · Use the right safety equipment for the activity you do (such as a bicycle helmet when you ride a bicycle outdoors). Wear closed-toed shoes that fit your feet well.  · Drink plenty of water before and during activity.  · Keep a fast-acting sugar (such as glucose tablets) on hand in case of low blood sugar.  · Dress properly for the weather. Wear a hat if its jose, or wait until evening if its too hot.  · Avoid being active for long periods in very hot or very cold weather.  · Skip activity if youre " sick.     Notice how activity affects blood sugar  Physical activity is important when you have diabetes. But you need to keep an eye on your blood sugar level. Check often if you have been active for longer than usual, or if the activity was unplanned. Make it a habit to check your blood sugar before being active. And check again a few hours later. Use your log book to write down how activity affects your numbers. If you take insulin, you may be able to adjust your dose before a planned activity. This can help prevent lows. You may also need to take a small carbohydrate snack before the exercise. Talk to your healthcare provider to learn more.    Date Last Reviewed: 6/1/2016  © 1439-3284 [employer]. [address] [place] [address], [place] [address]. All rights reserved. This information is not intended as a substitute for professional medical care. Always follow your healthcare professional's instructions.

## 2017-09-23 RX ORDER — BLOOD SUGAR DIAGNOSTIC
STRIP MISCELLANEOUS
Qty: 100 STRIP | Refills: 3 | Status: SHIPPED | OUTPATIENT
Start: 2017-09-23 | End: 2018-08-13

## 2017-09-23 RX ORDER — PEN NEEDLE, DIABETIC 31 GX5/16"
NEEDLE, DISPOSABLE MISCELLANEOUS
Qty: 100 EACH | Refills: 3 | Status: SHIPPED | OUTPATIENT
Start: 2017-09-23 | End: 2018-10-25 | Stop reason: SDUPTHER

## 2017-10-03 ENCOUNTER — CLINICAL SUPPORT (OUTPATIENT)
Dept: FAMILY MEDICINE | Facility: CLINIC | Age: 67
End: 2017-10-03
Payer: MEDICARE

## 2017-10-03 DIAGNOSIS — Z23 INFLUENZA VACCINE ADMINISTERED: Primary | ICD-10-CM

## 2017-10-04 PROCEDURE — 90662 IIV NO PRSV INCREASED AG IM: CPT | Mod: ,,, | Performed by: INTERNAL MEDICINE

## 2017-10-04 PROCEDURE — G0008 ADMIN INFLUENZA VIRUS VAC: HCPCS | Mod: ,,, | Performed by: INTERNAL MEDICINE

## 2017-10-16 RX ORDER — LOSARTAN POTASSIUM 50 MG/1
50 TABLET ORAL DAILY
Qty: 90 TABLET | Refills: 3 | Status: SHIPPED | OUTPATIENT
Start: 2017-10-16 | End: 2017-12-17 | Stop reason: SDUPTHER

## 2017-10-18 DIAGNOSIS — E78.5 HYPERLIPIDEMIA, UNSPECIFIED HYPERLIPIDEMIA TYPE: ICD-10-CM

## 2017-10-18 RX ORDER — LOVASTATIN 10 MG/1
10 TABLET ORAL NIGHTLY
Qty: 90 TABLET | Refills: 3 | Status: SHIPPED | OUTPATIENT
Start: 2017-10-18 | End: 2017-10-30 | Stop reason: SDUPTHER

## 2017-10-30 DIAGNOSIS — E78.5 HYPERLIPIDEMIA, UNSPECIFIED HYPERLIPIDEMIA TYPE: ICD-10-CM

## 2017-10-30 RX ORDER — LOVASTATIN 10 MG/1
10 TABLET ORAL NIGHTLY
Qty: 90 TABLET | Refills: 3 | Status: SHIPPED | OUTPATIENT
Start: 2017-10-30 | End: 2019-01-13 | Stop reason: SDUPTHER

## 2017-12-18 RX ORDER — LOSARTAN POTASSIUM 50 MG/1
TABLET ORAL
Qty: 90 TABLET | Refills: 2 | Status: SHIPPED | OUTPATIENT
Start: 2017-12-18 | End: 2019-01-28 | Stop reason: SDUPTHER

## 2018-02-09 RX ORDER — INSULIN GLARGINE 100 [IU]/ML
INJECTION, SOLUTION SUBCUTANEOUS
Qty: 30 ML | Refills: 3 | Status: SHIPPED | OUTPATIENT
Start: 2018-02-09 | End: 2018-02-20 | Stop reason: CLARIF

## 2018-02-16 LAB
ALT SERPL-CCNC: 21 IU/L (ref 0–44)
BUN SERPL-MCNC: 12 MG/DL (ref 8–27)
BUN/CREAT SERPL: 13 (ref 10–24)
CALCIUM SERPL-MCNC: 8.8 MG/DL (ref 8.6–10.2)
CHLORIDE SERPL-SCNC: 106 MMOL/L (ref 96–106)
CO2 SERPL-SCNC: 23 MMOL/L (ref 18–29)
CREAT SERPL-MCNC: 0.96 MG/DL (ref 0.76–1.27)
GFR SERPLBLD CREATININE-BSD FMLA CKD-EPI: 82 ML/MIN/1.73
GFR SERPLBLD CREATININE-BSD FMLA CKD-EPI: 95 ML/MIN/1.73
GLUCOSE SERPL-MCNC: 142 MG/DL (ref 65–99)
HBA1C MFR BLD: 7.4 % (ref 4.8–5.6)
POTASSIUM SERPL-SCNC: 4.5 MMOL/L (ref 3.5–5.2)
SODIUM SERPL-SCNC: 144 MMOL/L (ref 134–144)

## 2018-02-20 ENCOUNTER — OFFICE VISIT (OUTPATIENT)
Dept: FAMILY MEDICINE | Facility: CLINIC | Age: 68
End: 2018-02-20
Payer: COMMERCIAL

## 2018-02-20 VITALS — HEART RATE: 63 BPM | WEIGHT: 180 LBS | HEIGHT: 66 IN | BODY MASS INDEX: 28.93 KG/M2

## 2018-02-20 DIAGNOSIS — I10 BENIGN HYPERTENSION: Primary | ICD-10-CM

## 2018-02-20 DIAGNOSIS — E11.9 TYPE 2 DIABETES MELLITUS WITHOUT COMPLICATION, WITH LONG-TERM CURRENT USE OF INSULIN: ICD-10-CM

## 2018-02-20 DIAGNOSIS — I48.0 PAROXYSMAL ATRIAL FIBRILLATION: ICD-10-CM

## 2018-02-20 DIAGNOSIS — Z79.4 TYPE 2 DIABETES MELLITUS WITHOUT COMPLICATION, WITH LONG-TERM CURRENT USE OF INSULIN: ICD-10-CM

## 2018-02-20 DIAGNOSIS — R76.8 POSITIVE HEPATITIS C ANTIBODY TEST: ICD-10-CM

## 2018-02-20 PROCEDURE — 1159F MED LIST DOCD IN RCRD: CPT | Mod: ,,, | Performed by: INTERNAL MEDICINE

## 2018-02-20 PROCEDURE — 3008F BODY MASS INDEX DOCD: CPT | Mod: ,,, | Performed by: INTERNAL MEDICINE

## 2018-02-20 PROCEDURE — 1170F FXNL STATUS ASSESSED: CPT | Mod: ,,, | Performed by: INTERNAL MEDICINE

## 2018-02-20 PROCEDURE — 99214 OFFICE O/P EST MOD 30 MIN: CPT | Mod: ,,, | Performed by: INTERNAL MEDICINE

## 2018-02-20 PROCEDURE — 1126F AMNT PAIN NOTED NONE PRSNT: CPT | Mod: ,,, | Performed by: INTERNAL MEDICINE

## 2018-02-20 RX ORDER — INSULIN GLARGINE 100 [IU]/ML
20 INJECTION, SOLUTION SUBCUTANEOUS NIGHTLY
Qty: 18 ML | Refills: 3 | Status: SHIPPED | OUTPATIENT
Start: 2018-02-20 | End: 2018-07-19 | Stop reason: ALTCHOICE

## 2018-02-20 RX ORDER — INSULIN GLARGINE 100 [IU]/ML
20 INJECTION, SOLUTION SUBCUTANEOUS NIGHTLY
Qty: 18 ML | Refills: 3 | Status: SHIPPED | OUTPATIENT
Start: 2018-02-20 | End: 2018-07-19 | Stop reason: SDUPTHER

## 2018-02-20 NOTE — PROGRESS NOTES
Subjective:       Patient ID: Nicolás Batres is a 67 y.o. male.    Chief Complaint: Diabetes (lab review ); Hypertension; and Hyperlipidemia    Mr. Monzon is a 67-year-old male who comes for followup. He Has type 2 diabetes, hypertension and dyslipidemia.    He states that his blood sugars in morning are usually 105 approximately.    His lab shows a fasting blood sugar Of greater than 140. Hemoglobin A1c is 7.4.He does not check his  Sugars  After meals.  He also has difficulty affording the cost of Lantus insulin.    He also has to pay for Eliquis for anticoagulation  Which adds to his financial burden.    In past his hepatitis C screening was  Positive. I had ordered Confirmatory tests which were never done.    He states that his previous primary care physician had informed him that he was ultimately negative. Patient denies any risk factors  For ti hep C.      Diabetes   He presents for his follow-up diabetic visit. He has type 2 diabetes mellitus. His disease course has been stable. Pertinent negatives for hypoglycemia include no confusion, dizziness, mood changes, nervousness/anxiousness, pallor, seizures, speech difficulty, sweats or tremors. Pertinent negatives for diabetes include no chest pain, no fatigue, no foot ulcerations, no polydipsia, no polyphagia, no polyuria, no visual change and no weakness. Symptoms are improving. Pertinent negatives for diabetic complications include no retinopathy. Risk factors for coronary artery disease include hypertension and male sex. Current diabetic treatment includes oral agent (dual therapy) and insulin injections. He is compliant with treatment most of the time. He has not had a previous visit with a dietitian. He rarely participates in exercise. His home blood glucose trend is decreasing steadily. His breakfast blood glucose range is generally  mg/dl. An ACE inhibitor/angiotensin II receptor blocker is being taken. He does not see a podiatrist.Eye exam  is not current.   Hypertension   This is a chronic problem. The current episode started more than 1 year ago. The problem is unchanged. The problem is controlled. Pertinent negatives include no chest pain, neck pain, palpitations, shortness of breath or sweats. Risk factors for coronary artery disease include male gender, sedentary lifestyle and diabetes mellitus. Past treatments include beta blockers and angiotensin blockers. There is no history of retinopathy. There is no history of hyperaldosteronism, hypercortisolism, a hypertension causing med or pheochromocytoma.   Hyperlipidemia   This is a chronic problem. The current episode started more than 1 year ago. Recent lipid tests were reviewed and are variable. He has no history of hypothyroidism or obesity. Pertinent negatives include no chest pain or shortness of breath.       Past Medical History:   Diagnosis Date    Diabetes mellitus type I     Hypertension      Social History     Social History    Marital status:      Spouse name: N/A    Number of children: N/A    Years of education: N/A     Occupational History    Not on file.     Social History Main Topics    Smoking status: Never Smoker    Smokeless tobacco: Never Used    Alcohol use Yes    Drug use: No    Sexual activity: Yes     Partners: Female     Other Topics Concern    Not on file     Social History Narrative    No narrative on file     History reviewed. No pertinent surgical history.  Family History   Problem Relation Age of Onset    No Known Problems Mother     Diabetes Father     Heart disease Father     Hypertension Father        Review of Systems   Constitutional: Negative for activity change, appetite change, fatigue and unexpected weight change.   HENT: Negative for congestion, sneezing and trouble swallowing.    Eyes: Negative for pain and visual disturbance.   Respiratory: Negative for cough, chest tightness and shortness of breath.    Cardiovascular: Negative for  "chest pain, palpitations and leg swelling.        HTN +ve but Normal Lipids   Gastrointestinal: Negative for abdominal distention, abdominal pain, blood in stool, constipation and diarrhea.        Hep C screen was positive.   Endocrine: Negative for cold intolerance, heat intolerance, polydipsia, polyphagia and polyuria.        DM-elevated triglycerides. LDL cholesterol is less than 60   Genitourinary: Negative for dysuria.   Musculoskeletal: Negative for arthralgias (rt knee pain), back pain, gait problem and neck pain.   Skin: Negative for pallor, rash and wound.   Allergic/Immunologic: Negative for environmental allergies, food allergies and immunocompromised state.   Neurological: Negative for dizziness, tremors, seizures, speech difficulty, weakness, light-headedness and numbness.   Hematological: Negative for adenopathy. Does not bruise/bleed easily.   Psychiatric/Behavioral: Negative for agitation, behavioral problems and confusion. The patient is not nervous/anxious.        Objective:       Vitals:    02/20/18 1521   BP: (P) 124/70   Pulse: 63   Weight: 81.6 kg (180 lb)   Height: 5' 6" (1.676 m)     Physical Exam   Constitutional: He appears well-developed.   HENT:   Head: Normocephalic and atraumatic.   Nose: Nose normal.   Mouth/Throat: Oropharynx is clear and moist. No oropharyngeal exudate.   Eyes: Conjunctivae and EOM are normal.   Neck: Normal range of motion. Neck supple. No JVD present. No tracheal deviation present. No thyromegaly present.   Cardiovascular: Normal rate, regular rhythm and normal heart sounds.  Exam reveals no gallop and no friction rub.    No murmur heard.  Pulses:       Dorsalis pedis pulses are 1+ on the left side.        Posterior tibial pulses are 1+ on the left side.   Pulmonary/Chest: Effort normal and breath sounds normal. No respiratory distress. He has no wheezes. He has no rales.   Abdominal: Soft. Bowel sounds are normal. He exhibits no distension. There is no tenderness. "   Musculoskeletal:        Right foot: There is no deformity.   Feet:   Right Foot:   Protective Sensation: 4 sites tested. 4 sites sensed.   Skin Integrity: Negative for ulcer or blister.   Left Foot:   Protective Sensation: 4 sites tested. 4 sites sensed.   Skin Integrity: Negative for ulcer or blister.   Neurological: He is alert. He has normal reflexes.   Skin: Skin is warm and dry.   Psychiatric: He has a normal mood and affect.   Somewhat incongruent   Nursing note and vitals reviewed.      Assessment:     Hemoglobin A1c   Order: [id number]   Status:  Final result   Visible to patient:  No (Not Released) Next appt:  None Dx:  Type 2 diabetes mellitus without comp...     Ref Range & Units 5d ago 7mo ago    Hemoglobin A1C 4.8 - 5.6 % 7.4   7.8CM     Comments:          Pre-diabetes: 5.7 - 6.4            Diabetes: >6.4            Glycemic control for adults with diabetes: <7.0            Basic metabolic panel   Order: [id number]   Status:  Final result   Visible to patient:  No (Not Released) Next appt:  None Dx:  Benign hypertension     Ref Range & Units 5d ago 7mo ago    Glucose 65 - 99 mg/dL 142   146      BUN, Bld 8 - 27 mg/dL 12  12     Creatinine 0.76 - 1.27 mg/dL 0.96  1.08R      eGFR if non African American >59 mL/min/1.73 82  54      eGFR if African American >59 mL/min/1.73 95  62     BUN/Creatinine Ratio 10 - 24 13  11R      Sodium 134 - 144 mmol/L 144  141     Potassium 3.5 - 5.2 mmol/L 4.5  4.3     Chloride 96 - 106 mmol/L 106  101     CO2 18 - 29 mmol/L 23  18     Calcium 8.6 - 10.2 mg/dL 8.8  9.3R          ALT (SGPT)   Order: [id number]   Status:  Final result   Visible to patient:  No (Not Released) Next appt:  None Dx:  Hyperlipidemia, unspecified hyperlipi...     Ref Range & Units 5d ago 7mo ago    ALT 0 - 44 IU/L 21  38R                 1. Benign hypertension    2. Paroxysmal atrial fibrillation    3. Type 2 diabetes mellitus without complication, with long-term current use of insulin    4. Positive  "hepatitis C antibody test         Plan:           Benign hypertension    Paroxysmal atrial fibrillation    Type 2 diabetes mellitus without complication, with long-term current use of insulin  -     insulin glargine (LANTUS SOLOSTAR) 100 unit/mL (3 mL) InPn pen; Inject 20 Units into the skin every evening.  Dispense: 18 mL; Refill: 3  -     Cancel: Lipid panel; Future; Expected date: 02/20/2018  -     Cancel: Hemoglobin A1c; Future; Expected date: 02/20/2018  -     insulin glargine (BASAGLAR KWIKPEN U-100 INSULIN) 100 unit/mL (3 mL) InPn pen; Inject 20 Units into the skin every evening.  Dispense: 18 mL; Refill: 3  -     Hemoglobin A1c; Future; Expected date: 02/20/2018  -     Lipid panel; Future; Expected date: 02/20/2018    Positive hepatitis C antibody test    I will change Lantus to Basaglar in view off coverage issues.    If cost is prohibitive call I will consider genetic insulin.    Have again requested labs from Dr. Ramirez for office. I've given a copy of letter  To the patient also for Dr. Ramirez.    Patient is resistant consider statin medications at this point. He states that his lipid is fine.  I again reiterated that in view off diabetes or hypertension wish to try to shoot for lower LDL and perhaps  A better triglyceride level. He will reflect on this.    Followup in 3 months.  Repeat hemoglobin A1c and lipid panel.    Check postprandial blood sugars also.    Current Outpatient Prescriptions on File Prior to Visit   Medication Sig Dispense Refill    ACCU-CHEK ZACK PLUS TEST STRP Strp TEST EVERY  strip 3    aspirin (ECOTRIN) 81 MG EC tablet Take 1 tablet by mouth Daily.      BD INSULIN PEN NEEDLE UF SHORT 31 gauge x 5/16" Ndle USE EVERY  each 3    cyanocobalamin (VITAMIN B-12) 500 MCG tablet Take 500 mcg by mouth once daily.      ELIQUIS 5 mg Tab Take 5 mg by mouth 2 (two) times daily.  3    esomeprazole (NEXIUM) 20 MG capsule Take 20 mg by mouth before breakfast.      " fluticasone (FLONASE) 50 mcg/actuation nasal spray USE 2 SPRAYS TO EACH NOSTRIL ONCE A DAY 3 Bottle 3    losartan (COZAAR) 50 MG tablet TAKE 1 TABLET EVERY DAY 90 tablet 2    lovastatin (MEVACOR) 10 MG tablet Take 1 tablet (10 mg total) by mouth every evening. 90 tablet 3    metformin (GLUCOPHAGE-XR) 500 MG 24 hr tablet Take 1 tablet by mouth Daily.      metoprolol tartrate (LOPRESSOR) 25 MG tablet       nateglinide (STARLIX) 60 MG tablet Take 60 mg by mouth 3 (three) times daily before meals.      [DISCONTINUED] LANTUS SOLOSTAR 100 unit/mL (3 mL) InPn pen INJECT  18 UNITS SUBCUTANEOUSLY IN THE MORNING 30 mL 3    [DISCONTINUED] valacyclovir (VALTREX) 500 MG tablet Take 500 mg by mouth once daily.  1     No current facility-administered medications on file prior to visit.

## 2018-02-20 NOTE — PATIENT INSTRUCTIONS
Using a Blood Sugar Log    You have diabetes. This means your body has trouble regulating a sugar called glucose. To help manage your diabetes, youll need to check your blood sugar level as directed by your healthcare provider. Keeping a log of your blood sugar levels will help you track your blood sugar readings. Its a simple and easy way to see how well you are controlling your diabetes.  Checking your blood sugar level  You can check your blood sugar level with a blood glucose meter. Youll first prick the side of your finger with a tiny lancet to draw a tiny drop of blood onto the test strip. Some glucose meters let you use another place on your body to test. But these other places should not be used in some cases as they may be inaccurate. Follow the instructions for your glucose meter. And talk with your healthcare provider before doing the test on other places.  The strip goes into the meter first, then a drop of blood is placed on the tip of the strip. The meter then shows a reading that tells you the level of your blood sugar. Your readings should be in your target range as often as possible. This means not too high or too low. Staying in this range helps lower your risk for complications. Your healthcare provider will help you figure out the target range that is best for you.  Tracking your readings  Every time you check your blood sugar, use your log to keep track of your readings. Your meter will also probably have a memory feature that your healthcare provider can check at your next visit. You may be advised by your healthcare provider to check your blood sugar in the morning, at bedtime, and before and after meals. Be sure to write down all of your numbers. Also use your log to record things that might have affected your blood sugar. Some examples include being sick, certain medicines, being physically active, feeling stressed, or skipping meals.   Lessons learned from your readings  Tracking your  blood sugar readings helps you see patterns. These patterns tell you how your actions affect your blood sugar. For instance, you may have higher numbers after eating certain foods or lower numbers after exercise. They just help you understand how to stay in your target range more often, so that your diabetes remains in good control.  Sharing your log with your healthcare team  Bring your blood sugar log and glucose meter with you to all of your healthcare appointments. This can help your healthcare team make changes to your treatment plan, if needed. This may involve making changes in what you eat, what medicines you take, or how much you exercise.  To learn more  The resources below can help you learn more:  · American Diabetes Association 103-429-1743 www.diabetes.org  · [employer] 878-311-2673 www.lighthouse.org  · [employer] 853-048-3374 www.nei.nih.gov  · [employer] 915-497-4059 www.hormone.org  Date Last Reviewed: 5/1/2016  © 4988-9447 [employer] [address] [place] [address], [place] [address]. All rights reserved. This information is not intended as a substitute for professional medical care. Always follow your healthcare professional's instructions.

## 2018-02-20 NOTE — LETTER
February 20, 2018        Nicolás Ramirez, DO  [address]  [facility]  [facility] [id number]             [facility]  [address] [facility] [address]  Phone: [phone]  Fax: [phone]   Patient: Nicolás Batres   MR Number: [id number]   YOB: 1950   Date of Visit: 2/20/2018     Dear Dr. Ramirez,     It was a pleasure seeing Mr. Nicolás Batres for  his multiple medical issues including diabetes, hypertension and dyslipidemia.    Routine screening for hepatitis C was positive and he informs me that prior confirmatory tests at your office were negative.    I could not easily locate those confirmatory tests and if you have a copy please forward them to me or to the patient who will in turn forward them to me.        Sincerely,      Maico Patterson MD

## 2018-03-22 RX ORDER — METFORMIN HYDROCHLORIDE 500 MG/1
TABLET, EXTENDED RELEASE ORAL
Qty: 180 TABLET | Refills: 3 | Status: SHIPPED | OUTPATIENT
Start: 2018-03-22 | End: 2019-01-30 | Stop reason: SDUPTHER

## 2018-05-06 RX ORDER — NATEGLINIDE 60 MG/1
TABLET ORAL
Qty: 90 TABLET | Refills: 4 | Status: SHIPPED | OUTPATIENT
Start: 2018-05-06 | End: 2018-06-28 | Stop reason: SDUPTHER

## 2018-06-28 RX ORDER — NATEGLINIDE 60 MG/1
TABLET ORAL
Qty: 270 TABLET | Refills: 3 | Status: SHIPPED | OUTPATIENT
Start: 2018-06-28 | End: 2019-07-02

## 2018-07-13 LAB
CHOLEST SERPL-MCNC: 168 MG/DL (ref 100–199)
HBA1C MFR BLD: 8.6 % (ref 4.8–5.6)
HDLC SERPL-MCNC: 44 MG/DL
LDLC SERPL CALC-MCNC: 92 MG/DL (ref 0–99)
TRIGL SERPL-MCNC: 160 MG/DL (ref 0–149)
VLDLC SERPL CALC-MCNC: 32 MG/DL (ref 5–40)

## 2018-07-19 ENCOUNTER — OFFICE VISIT (OUTPATIENT)
Dept: FAMILY MEDICINE | Facility: CLINIC | Age: 68
End: 2018-07-19
Payer: COMMERCIAL

## 2018-07-19 VITALS
HEART RATE: 73 BPM | OXYGEN SATURATION: 98 % | DIASTOLIC BLOOD PRESSURE: 82 MMHG | HEIGHT: 66 IN | WEIGHT: 180 LBS | BODY MASS INDEX: 28.93 KG/M2 | RESPIRATION RATE: 16 BRPM | SYSTOLIC BLOOD PRESSURE: 129 MMHG

## 2018-07-19 DIAGNOSIS — E78.5 HYPERLIPIDEMIA, UNSPECIFIED HYPERLIPIDEMIA TYPE: ICD-10-CM

## 2018-07-19 DIAGNOSIS — N50.812 PAIN IN LEFT TESTICLE: ICD-10-CM

## 2018-07-19 DIAGNOSIS — I48.0 PAROXYSMAL ATRIAL FIBRILLATION: ICD-10-CM

## 2018-07-19 DIAGNOSIS — Z79.4 TYPE 2 DIABETES MELLITUS WITHOUT COMPLICATION, WITH LONG-TERM CURRENT USE OF INSULIN: Primary | ICD-10-CM

## 2018-07-19 DIAGNOSIS — I10 BENIGN HYPERTENSION: ICD-10-CM

## 2018-07-19 DIAGNOSIS — Z23 NEED FOR ZOSTER VACCINE: ICD-10-CM

## 2018-07-19 DIAGNOSIS — E11.9 TYPE 2 DIABETES MELLITUS WITHOUT COMPLICATION, WITH LONG-TERM CURRENT USE OF INSULIN: Primary | ICD-10-CM

## 2018-07-19 PROCEDURE — 99214 OFFICE O/P EST MOD 30 MIN: CPT | Mod: ,,, | Performed by: INTERNAL MEDICINE

## 2018-07-19 PROCEDURE — 3045F PR MOST RECENT HEMOGLOBIN A1C LEVEL 7.0-9.0%: CPT | Mod: ,,, | Performed by: INTERNAL MEDICINE

## 2018-07-19 PROCEDURE — 3079F DIAST BP 80-89 MM HG: CPT | Mod: ,,, | Performed by: INTERNAL MEDICINE

## 2018-07-19 PROCEDURE — 3074F SYST BP LT 130 MM HG: CPT | Mod: ,,, | Performed by: INTERNAL MEDICINE

## 2018-07-19 RX ORDER — INSULIN GLARGINE 100 [IU]/ML
20 INJECTION, SOLUTION SUBCUTANEOUS NIGHTLY
Qty: 24 ML | Refills: 3
Start: 2018-07-19 | End: 2019-01-30 | Stop reason: SDUPTHER

## 2018-07-19 NOTE — PROGRESS NOTES
Subjective:       Patient ID: Nicolás Batres is a 68 y.o. male.    Chief Complaint: Diabetes; Hypertension; Hyperlipidemia; Testicle Pain; and Atrial Fibrillation    Mr. Monzon is a 67-year-old male who comes for followup. He Has type 2 diabetes, hypertension and dyslipidemia.    He states that his blood sugars in morning are usually 105 approximately.    His lab shows a fasting blood sugar Of greater than 140. Hemoglobin A1c is 7.4.He does not check his  Sugars  After meals.  He also has difficulty affording the cost of Lantus insulin.    He also has to pay for Eliquis for anticoagulation  Which adds to his financial burden.    In past his hepatitis C screening was  Positive.He states that his previous primary care physician had informed him that he was ultimately negative. Patient denies any risk factors  For ti hep C. HEP C PCR done by Dr Ramirez was negative.      Diabetes   He presents for his follow-up diabetic visit. He has type 2 diabetes mellitus. His disease course has been stable. Pertinent negatives for hypoglycemia include no confusion, dizziness, mood changes, nervousness/anxiousness, pallor, seizures, speech difficulty, sweats or tremors. Pertinent negatives for diabetes include no chest pain, no fatigue, no foot ulcerations, no polydipsia, no polyphagia, no polyuria, no visual change and no weakness. Symptoms are improving. Pertinent negatives for diabetic complications include no retinopathy. Risk factors for coronary artery disease include hypertension and male sex. Current diabetic treatment includes oral agent (dual therapy) and insulin injections. He is compliant with treatment most of the time. He has not had a previous visit with a dietitian. He rarely participates in exercise. His home blood glucose trend is decreasing steadily. His breakfast blood glucose range is generally  mg/dl. An ACE inhibitor/angiotensin II receptor blocker is being taken. He does not see a podiatrist.Eye  exam is not current.   Hypertension   This is a chronic problem. The current episode started more than 1 year ago. The problem is unchanged. The problem is controlled. Pertinent negatives include no chest pain, neck pain, palpitations, shortness of breath or sweats. Risk factors for coronary artery disease include male gender, sedentary lifestyle and diabetes mellitus. Past treatments include beta blockers and angiotensin blockers. There is no history of retinopathy. There is no history of hyperaldosteronism, hypercortisolism, a hypertension causing med or pheochromocytoma.   Hyperlipidemia   This is a chronic problem. The current episode started more than 1 year ago. Recent lipid tests were reviewed and are variable. He has no history of hypothyroidism or obesity. Pertinent negatives include no chest pain or shortness of breath.     Patient also complains of pain in the left testicle. This is almost a checkout complain. Duration of this problem is unclear at this point. He is not sexually active at this point and has been in a monogamous relationship in past. No history of sexually transmitted disease. No history of mumps. No history of hernia or hydrocele.  Past Medical History:   Diagnosis Date    Allergy     pollen extracts    Diabetes mellitus type I     Hypertension      Social History     Social History    Marital status:      Spouse name: N/A    Number of children: 2    Years of education: N/A     Occupational History    [employer]     Social History Main Topics    Smoking status: Never Smoker    Smokeless tobacco: Never Used    Alcohol use Yes    Drug use: No    Sexual activity: Yes     Partners: Female     Other Topics Concern    Not on file     Social History Narrative    No narrative on file     History reviewed. No pertinent surgical history.  Family History   Problem Relation Age of Onset    Abnormal EKG Mother     Diabetes Father     Heart disease Father      "Hypertension Father        Review of Systems   Constitutional: Negative for activity change, appetite change, fatigue and unexpected weight change.   HENT: Negative for congestion, sneezing and trouble swallowing.    Eyes: Negative for pain and visual disturbance.   Respiratory: Negative for cough, chest tightness and shortness of breath.    Cardiovascular: Negative for chest pain, palpitations and leg swelling.        HTN +ve but Normal Lipids   Gastrointestinal: Negative for abdominal distention, abdominal pain, blood in stool, constipation and diarrhea.        Hep C screen was positive.   Endocrine: Negative for cold intolerance, heat intolerance, polydipsia, polyphagia and polyuria.        DM-elevated triglycerides. LDL cholesterol is less than 60   Genitourinary: Negative for dysuria.   Musculoskeletal: Negative for arthralgias (rt knee pain), back pain, gait problem and neck pain.   Skin: Negative for pallor, rash and wound.   Allergic/Immunologic: Negative for environmental allergies, food allergies and immunocompromised state.   Neurological: Negative for dizziness, tremors, seizures, speech difficulty, weakness, light-headedness and numbness.   Hematological: Negative for adenopathy. Does not bruise/bleed easily.   Psychiatric/Behavioral: Negative for agitation, behavioral problems and confusion. The patient is not nervous/anxious.        Objective:       Vitals:    07/19/18 1129   BP: 129/82   Pulse: 73   Resp: 16   SpO2: 98%   Weight: 81.6 kg (180 lb)   Height: 5' 6" (1.676 m)     Physical Exam   Constitutional: He appears well-developed.   HENT:   Head: Normocephalic and atraumatic.   Nose: Nose normal.   Mouth/Throat: Oropharynx is clear and moist. No oropharyngeal exudate.   Eyes: Conjunctivae and EOM are normal.   Neck: Normal range of motion. Neck supple. No JVD present. No tracheal deviation present. No thyromegaly present.   Cardiovascular: Normal rate, regular rhythm and normal heart sounds.  Exam " reveals no gallop and no friction rub.    No murmur heard.  Pulses:       Dorsalis pedis pulses are 1+ on the left side.        Posterior tibial pulses are 1+ on the left side.   Pulmonary/Chest: Effort normal and breath sounds normal. No respiratory distress. He has no wheezes. He has no rales.   Abdominal: Soft. Bowel sounds are normal. He exhibits no distension. There is no tenderness.   Genitourinary: Left testis shows tenderness.   Musculoskeletal:        Legs:       Right foot: There is no deformity.   Feet:   Right Foot:   Protective Sensation: 4 sites tested. 4 sites sensed.   Skin Integrity: Negative for ulcer or blister.   Left Foot:   Protective Sensation: 4 sites tested. 4 sites sensed.   Skin Integrity: Negative for ulcer or blister.   Neurological: He is alert. He has normal reflexes.   Skin: Skin is warm and dry.   Psychiatric: He has a normal mood and affect.   Somewhat incongruent   Nursing note and vitals reviewed.      Assessment:     Hemoglobin A1c   Order: [id number]   Status:  Final result   Visible to patient:  No (Not Released) Next appt:  None Dx:  Type 2 diabetes mellitus without comp...     Ref Range & Units 5d ago 7mo ago    Hemoglobin A1C 4.8 - 5.6 % 7.4   7.8CM     Comments:          Pre-diabetes: 5.7 - 6.4            Diabetes: >6.4            Glycemic control for adults with diabetes: <7.0            Basic metabolic panel   Order: [id number]   Status:  Final result   Visible to patient:  No (Not Released) Next appt:  None Dx:  Benign hypertension     Ref Range & Units 5d ago 7mo ago    Glucose 65 - 99 mg/dL 142   146      BUN, Bld 8 - 27 mg/dL 12  12     Creatinine 0.76 - 1.27 mg/dL 0.96  1.08R      eGFR if non African American >59 mL/min/1.73 82  54      eGFR if African American >59 mL/min/1.73 95  62     BUN/Creatinine Ratio 10 - 24 13  11R      Sodium 134 - 144 mmol/L 144  141     Potassium 3.5 - 5.2 mmol/L 4.5  4.3     Chloride 96 - 106 mmol/L 106  101     CO2 18 - 29 mmol/L 23  18      Calcium 8.6 - 10.2 mg/dL 8.8  9.3R          ALT (SGPT)   Order: [id number]   Status:  Final result   Visible to patient:  No (Not Released) Next appt:  None Dx:  Hyperlipidemia, unspecified hyperlipi...     Ref Range & Units 5d ago 7mo ago    ALT 0 - 44 IU/L 21  38R                 1. Type 2 diabetes mellitus without complication, with long-term current use of insulin    2. Benign hypertension    3. Hyperlipidemia, unspecified hyperlipidemia type    4. Paroxysmal atrial fibrillation    5. Pain in left testicle    6. Need for zoster vaccine         Plan:           Type 2 diabetes mellitus without complication, with long-term current use of insulin  -     Ambulatory referral to Nutrition Services  -     insulin glargine (LANTUS SOLOSTAR) 100 unit/mL (3 mL) InPn pen; Inject 20 Units into the skin every evening.  Dispense: 24 mL; Refill: 3  -     Microalbumin/creatinine urine ratio; Future; Expected date: 07/19/2018  -     Hemoglobin A1c; Future; Expected date: 07/19/2018    Benign hypertension  -     Comprehensive metabolic panel; Future; Expected date: 07/19/2018    Hyperlipidemia, unspecified hyperlipidemia type    Paroxysmal atrial fibrillation    Pain in left testicle  -     US Scrotum And Testicles; Future; Expected date: 07/19/2018    Need for zoster vaccine  -     Discontinue: varicella-zoster gE-AS01B, PF, (SHINGRIX, PF,) 50 mcg/0.5 mL injection; Inject 0.5 mLs into the muscle once. First dose now to be given at pharmacy and second booster dose between 2 months and 6 months. for 1 dose  Dispense: 0.5 mL; Refill: 1  -     varicella-zoster gE-AS01B, PF, (SHINGRIX, PF,) 50 mcg/0.5 mL injection; Inject 0.5 mLs into the muscle once. First dose now to be given at pharmacy and second booster in two months to six months for 1 dose  Dispense: 0.5 mL; Refill: 1    Patient is taking injection Lantus instead of basaglar.  Followup in 3 months.  Repeat hemoglobin A1c and lipid panel.    New complaints include testicular  "pain and some lumps in the hip. I'll check an ultrasound of testicle. I don't see any major issue except possibility of some varicoceles. In the hip region he seems to have some fatty deposits versus lipomas.    He needs to keep his follow-up with cardiology also. Order for shingles vaccine  Given.    He seems to obsessed and fixated on as to why his blood sugars are not getting better when he is a watches his diet.    Current Outpatient Prescriptions on File Prior to Visit   Medication Sig Dispense Refill    ACCU-CHEK ZACK PLUS TEST STRP Strp TEST EVERY  strip 3    aspirin (ECOTRIN) 81 MG EC tablet Take 1 tablet by mouth Daily.      BD INSULIN PEN NEEDLE UF SHORT 31 gauge x 5/16" Ndle USE EVERY  each 3    cyanocobalamin (VITAMIN B-12) 500 MCG tablet Take 500 mcg by mouth once daily.      ELIQUIS 5 mg Tab Take 5 mg by mouth 2 (two) times daily.  3    esomeprazole (NEXIUM) 20 MG capsule Take 20 mg by mouth before breakfast.      fluticasone (FLONASE) 50 mcg/actuation nasal spray USE 2 SPRAYS TO EACH NOSTRIL ONCE A DAY 3 Bottle 3    losartan (COZAAR) 50 MG tablet TAKE 1 TABLET EVERY DAY 90 tablet 2    lovastatin (MEVACOR) 10 MG tablet Take 1 tablet (10 mg total) by mouth every evening. 90 tablet 3    metFORMIN (GLUCOPHAGE-XR) 500 MG 24 hr tablet TAKE 1 TABLET TWICE DAILY (DOSE INCREASED TWICE DAILY) 180 tablet 3    metoprolol tartrate (LOPRESSOR) 25 MG tablet       nateglinide (STARLIX) 60 MG tablet TAKE 1 TABLET THREE TIMES DAILY BEFORE MEALS 270 tablet 3    [DISCONTINUED] insulin glargine (BASAGLAR KWIKPEN U-100 INSULIN) 100 unit/mL (3 mL) InPn pen Inject 20 Units into the skin every evening. 18 mL 3    [DISCONTINUED] insulin glargine (LANTUS SOLOSTAR) 100 unit/mL (3 mL) InPn pen Inject 20 Units into the skin every evening. 18 mL 3     No current facility-administered medications on file prior to visit.        "

## 2018-07-19 NOTE — PATIENT INSTRUCTIONS
Diabetic Foot Care  Diabetes can lead to a number of foot complications. Fortunately, you can prevent most of these with a little extra foot care. If diabetes is not well controlled, it can cause damage to blood vessels and result in poor circulation to the foot. When the skin does not get enough blood flow, it becomes prone to pressure sores and ulcers, which heal slowly.  Diabetes can also damage nerves, interfering with the ability to feel pain and pressure. When you cant feel your foot normally, it is easy to injure your skin, bones, and joints without knowing it. For these reasons diabetes increases the risk of fungal infections, bunions, and ulcers. An ulcer is a sore or break in the skin. With ulcers, often the skin seems to have worn away. Deep ulcers can lead to bone infection.  Gangrene is the most serious foot complication of diabetes. It usually occurs on the tips of the toes as blackened areas of skin. The black area is dead tissue. In severe cases, gangrene spreads to involve the entire toe, other toes, and the entire foot. Foot or toe amputation may be required. Good foot care and blood sugar control can prevent this.  Home care  · Wear comfortable, well-fitting shoes.  · Wash your feet daily with warm water and mild soap.  · After drying, apply a moisturizing cream or lotion to the top and bottom of your feet. Don't put lotion between toes.  · Check your feet daily for skin breaks, blisters, swelling, or redness. Look between your toes as well. If you cannot see the bottoms of your feet, ask someone to look or use a mirror.  · Wear cotton socks and change them every day.  · Trim toenails carefully, and do not cut your cuticles.  · Strive to keep your blood sugar under control with a combination of medicines, diet, and activity.  · If you smoke and have diabetes, it is very important that you stop. Smoking reduces blood flow to your foot.  · Schedule foot exams at least every year, or more often if  you have foot problems.  · Put your feet up when sitting, wiggle toes, and move ankles to help improve blood flow.  Avoid activities that increase your risk of foot injury:  · Do not walk barefoot.  · Do not use heating pads or hot water bottles on your feet.  · Do not put your foot in a hot tub without first checking the temperature with your hand.  Follow-up care  Follow up with your healthcare provider, or as advised. Be sure to take off your shoes and socks before your appointment starts so your healthcare provider will be sure to check your feet. Report any cut, puncture, scrape, blister, or other injury to your foot. Also report if you have a bunion, hammertoes, ingrown toenail, or ulcer on your foot.  When to seek medical advice  Call your healthcare provider right away if any of these occur:  · Black skin color anywhere on the foot  · Open ulcer with pus draining from the wound  · Increasing foot or leg pain  · New areas of redness or swelling or tender areas of the foot  · Fever of 100.4°F (38°C) or greater  Date Last Reviewed: 5/25/2016  © 3465-3056 [employer]. [address] [place] [address], [place] [address]. All rights reserved. This information is not intended as a substitute for professional medical care. Always follow your healthcare professional's instructions.

## 2018-07-25 ENCOUNTER — TELEPHONE (OUTPATIENT)
Dept: FAMILY MEDICINE | Facility: CLINIC | Age: 68
End: 2018-07-25

## 2018-07-25 NOTE — TELEPHONE ENCOUNTER
----- Message from Maico Patterson MD sent at 7/24/2018 10:52 PM CDT -----  Notify patient that Ultrasound of scrotum shows slight fluid collection in testicles. Try some scrotal support bandages available OTC from any pharmacy  Might consider referral to urology if needed.

## 2018-10-19 LAB
ALBUMIN SERPL-MCNC: 4.6 G/DL (ref 3.6–4.8)
ALBUMIN/CREAT UR: 70.2 MG/G CREAT (ref 0–30)
ALBUMIN/GLOB SERPL: 1.8 {RATIO} (ref 1.2–2.2)
ALP SERPL-CCNC: 76 IU/L (ref 39–117)
ALT SERPL-CCNC: 35 IU/L (ref 0–44)
AST SERPL-CCNC: 31 IU/L (ref 0–40)
BILIRUB SERPL-MCNC: 1.1 MG/DL (ref 0–1.2)
BUN SERPL-MCNC: 15 MG/DL (ref 8–27)
BUN/CREAT SERPL: 14 (ref 10–24)
CALCIUM SERPL-MCNC: 9.4 MG/DL (ref 8.6–10.2)
CHLORIDE SERPL-SCNC: 106 MMOL/L (ref 96–106)
CO2 SERPL-SCNC: 22 MMOL/L (ref 20–29)
CREAT SERPL-MCNC: 1.07 MG/DL (ref 0.76–1.27)
CREAT UR-MCNC: 159.8 MG/DL
EGFR IF AFRICAN AMERICAN: 83 ML/MIN/1.73
EST. GFR  (NON AFRICAN AMERICAN): 71 ML/MIN/1.73
GLOBULIN SER CALC-MCNC: 2.5 G/DL (ref 1.5–4.5)
GLUCOSE SERPL-MCNC: 178 MG/DL (ref 65–99)
HBA1C MFR BLD: 8.7 % (ref 4.8–5.6)
MICROALBUMIN UR-MCNC: 112.2 UG/ML
POTASSIUM SERPL-SCNC: 4.4 MMOL/L (ref 3.5–5.2)
PROT SERPL-MCNC: 7.1 G/DL (ref 6–8.5)
SODIUM SERPL-SCNC: 142 MMOL/L (ref 134–144)

## 2018-10-25 ENCOUNTER — OFFICE VISIT (OUTPATIENT)
Dept: FAMILY MEDICINE | Facility: CLINIC | Age: 68
End: 2018-10-25
Payer: COMMERCIAL

## 2018-10-25 VITALS
WEIGHT: 182 LBS | SYSTOLIC BLOOD PRESSURE: 118 MMHG | BODY MASS INDEX: 29.25 KG/M2 | DIASTOLIC BLOOD PRESSURE: 71 MMHG | HEART RATE: 61 BPM | HEIGHT: 66 IN

## 2018-10-25 DIAGNOSIS — I10 BENIGN HYPERTENSION: ICD-10-CM

## 2018-10-25 DIAGNOSIS — E78.2 MIXED HYPERLIPIDEMIA: ICD-10-CM

## 2018-10-25 DIAGNOSIS — K76.0 FATTY LIVER DISEASE, NONALCOHOLIC: ICD-10-CM

## 2018-10-25 DIAGNOSIS — Z23 PNEUMOCOCCAL VACCINE ADMINISTERED: ICD-10-CM

## 2018-10-25 DIAGNOSIS — E11.618 TYPE 2 DIABETES MELLITUS WITH OTHER DIABETIC ARTHROPATHY, WITH LONG-TERM CURRENT USE OF INSULIN: ICD-10-CM

## 2018-10-25 DIAGNOSIS — I48.0 PAROXYSMAL ATRIAL FIBRILLATION: Primary | ICD-10-CM

## 2018-10-25 DIAGNOSIS — Z79.4 TYPE 2 DIABETES MELLITUS WITH OTHER DIABETIC ARTHROPATHY, WITH LONG-TERM CURRENT USE OF INSULIN: ICD-10-CM

## 2018-10-25 DIAGNOSIS — Z23 INFLUENZA VACCINE ADMINISTERED: ICD-10-CM

## 2018-10-25 PROCEDURE — 99214 OFFICE O/P EST MOD 30 MIN: CPT | Mod: 25,,, | Performed by: INTERNAL MEDICINE

## 2018-10-25 PROCEDURE — 3078F DIAST BP <80 MM HG: CPT | Mod: ,,, | Performed by: INTERNAL MEDICINE

## 2018-10-25 PROCEDURE — 90662 IIV NO PRSV INCREASED AG IM: CPT | Mod: ,,, | Performed by: INTERNAL MEDICINE

## 2018-10-25 PROCEDURE — 3045F PR MOST RECENT HEMOGLOBIN A1C LEVEL 7.0-9.0%: CPT | Mod: ,,, | Performed by: INTERNAL MEDICINE

## 2018-10-25 PROCEDURE — 1101F PT FALLS ASSESS-DOCD LE1/YR: CPT | Mod: ,,, | Performed by: INTERNAL MEDICINE

## 2018-10-25 PROCEDURE — 90471 IMMUNIZATION ADMIN: CPT | Mod: ,,, | Performed by: INTERNAL MEDICINE

## 2018-10-25 PROCEDURE — 90732 PPSV23 VACC 2 YRS+ SUBQ/IM: CPT | Mod: ,,, | Performed by: INTERNAL MEDICINE

## 2018-10-25 PROCEDURE — 3074F SYST BP LT 130 MM HG: CPT | Mod: ,,, | Performed by: INTERNAL MEDICINE

## 2018-10-25 PROCEDURE — 90472 IMMUNIZATION ADMIN EACH ADD: CPT | Mod: ,,, | Performed by: INTERNAL MEDICINE

## 2018-10-25 RX ORDER — ESCITALOPRAM OXALATE 5 MG/1
5 TABLET ORAL DAILY
COMMUNITY
End: 2018-10-25

## 2018-10-25 RX ORDER — ZOSTER VACCINE RECOMBINANT, ADJUVANTED 50 MCG/0.5
KIT INTRAMUSCULAR
Refills: 1 | Status: ON HOLD | COMMUNITY
Start: 2018-07-26 | End: 2022-01-06 | Stop reason: ALTCHOICE

## 2018-10-25 RX ORDER — PEN NEEDLE, DIABETIC 30 GX3/16"
1 NEEDLE, DISPOSABLE MISCELLANEOUS DAILY
Qty: 100 EACH | Refills: 3 | Status: SHIPPED | OUTPATIENT
Start: 2018-10-25 | End: 2019-01-30

## 2018-10-25 NOTE — PROGRESS NOTES
Subjective:       Patient ID: Nicolás Batres is a 68 y.o. male.    Chief Complaint: Diabetes (lab revie w); Hypertension; Hyperlipidemia; and Atrial Fibrillation    Mr. Monzon is a 68-year-old male who comes for followup. He Has type 2 diabetes, hypertension and dyslipidemia.    He states that his blood sugars in morning are doing ok and has seen a dietician      He believes that his Lantus is not working.    On some nights he has stopped his Lantus and the next day there is no significant elevation of blood sugars as compared to when he takes the Lantus. He states that he has kept his Lantus in  A cool environment. This is mail order  Lantus which comes in ice pack.    Patient also gives me some conflicting information. Initially when he was in the office he stated that his sugars are doing well. But later on he stated that his sugars are getting elevated.    His blood pressures are doing okay.    His weight shows slight elevation but he attributes to wearing thick leonor jeans today.    He tends to check his blood sugar up to 4 times a day but has not really kept a good spreadsheet on the blood sugars.    He recently had seen a dietitian.    He continues on anticoagulation.      Diabetes   He presents for his follow-up diabetic visit. He has type 2 diabetes mellitus. His disease course has been stable. Pertinent negatives for hypoglycemia include no dizziness, mood changes, seizures, speech difficulty, sweats or tremors. Pertinent negatives for diabetes include no chest pain, no fatigue, no foot ulcerations, no polydipsia, no polyphagia, no polyuria and no visual change. Symptoms are worsening. Pertinent negatives for diabetic complications include no retinopathy. Risk factors for coronary artery disease include hypertension and male sex. Current diabetic treatment includes oral agent (dual therapy) and insulin injections. He is compliant with treatment most of the time. He has not had a previous visit with a  dietitian. He rarely participates in exercise. His home blood glucose trend is decreasing steadily. His breakfast blood glucose range is generally  mg/dl. An ACE inhibitor/angiotensin II receptor blocker is being taken. He does not see a podiatrist.Eye exam is not current.   Hypertension   This is a chronic problem. The current episode started more than 1 year ago. The problem is unchanged. The problem is controlled. Pertinent negatives include no chest pain or sweats. Risk factors for coronary artery disease include male gender, sedentary lifestyle and diabetes mellitus. Past treatments include beta blockers and angiotensin blockers. There is no history of retinopathy. There is no history of hyperaldosteronism, hypercortisolism, a hypertension causing med or pheochromocytoma.   Hyperlipidemia   This is a chronic problem. The current episode started more than 1 year ago. Recent lipid tests were reviewed and are variable. He has no history of hypothyroidism or obesity. Pertinent negatives include no chest pain. Current antihyperlipidemic treatment includes statins. The current treatment provides moderate improvement of lipids. Risk factors for coronary artery disease include diabetes mellitus, dyslipidemia and hypertension.   Atrial Fibrillation   Presents for follow-up visit. Symptoms are negative for bradycardia, chest pain, dizziness, hypotension, syncope and tachycardia. The symptoms have been stable. Past medical history includes atrial fibrillation and hyperlipidemia.       Past Medical History:   Diagnosis Date    Allergy     pollen extracts    Atrial fibrillation     Chronic anticoagulation     Diabetes mellitus type I     Hypertension      Social History     Socioeconomic History    Marital status:      Spouse name: Not on file    Number of children: 2    Years of education: Not on file    Highest education level: Not on file   Social Needs    Financial resource strain: Not on file     "Food insecurity - worry: Not on file    Food insecurity - inability: Not on file    Transportation needs - medical: Not on file    Transportation needs - non-medical: Not on file   Occupational History    Occupation: [employer]     Employer: [employer]   Tobacco Use    Smoking status: Never Smoker    Smokeless tobacco: Never Used   Substance and Sexual Activity    Alcohol use: Yes    Drug use: No    Sexual activity: Yes     Partners: Female   Other Topics Concern    Not on file   Social History Narrative    Not on file     History reviewed. No pertinent surgical history.  Family History   Problem Relation Age of Onset    Abnormal EKG Mother     Diabetes Father     Heart disease Father     Hypertension Father        Review of Systems   Constitutional: Positive for unexpected weight change (gaining wt steadily). Negative for activity change, appetite change and fatigue.   HENT: Negative for congestion, sneezing and trouble swallowing.    Eyes: Negative for pain and visual disturbance.   Respiratory: Negative for cough and chest tightness.    Cardiovascular: Negative for chest pain, leg swelling and syncope.        HTN +ve but Normal Lipids   Gastrointestinal: Negative for abdominal distention, abdominal pain, blood in stool, constipation and diarrhea.        Hep C screen was positive.   Endocrine: Negative for cold intolerance, heat intolerance, polydipsia, polyphagia and polyuria.        DM-elevated triglycerides. LDL cholesterol is92   Genitourinary: Negative for dysuria.   Allergic/Immunologic: Negative for environmental allergies, food allergies and immunocompromised state.   Neurological: Negative for dizziness, tremors, seizures, speech difficulty, light-headedness and numbness.   Hematological: Negative for adenopathy. Does not bruise/bleed easily.       Objective:       Vitals:    10/25/18 1545   BP: 118/71   Pulse: 61   Weight: 82.6 kg (182 lb)   Height: 5' 6" (1.676 m)     Physical Exam "   Constitutional: He appears well-developed.   HENT:   Head: Normocephalic and atraumatic.   Nose: Nose normal.   Mouth/Throat: Oropharynx is clear and moist. No oropharyngeal exudate.   Eyes: Conjunctivae and EOM are normal.   Neck: Normal range of motion. Neck supple. No JVD present. No tracheal deviation present. No thyromegaly present.   Cardiovascular: Normal rate, regular rhythm and normal heart sounds. Exam reveals no gallop and no friction rub.   No murmur heard.  Pulmonary/Chest: Effort normal and breath sounds normal. No respiratory distress. He has no wheezes. He has no rales.   Abdominal: Soft. Bowel sounds are normal. He exhibits no distension. There is no tenderness.   Neurological: He is alert. He has normal reflexes.   Skin: Skin is warm and dry.   Psychiatric: He has a normal mood and affect.   Somewhat incongruent   Nursing note and vitals reviewed.      Assessment:     Hemoglobin A1c   Order: [id number]   Status:  Final result   Visible to patient:  No (Not Released) Next appt:  None Dx:  Type 2 diabetes mellitus without comp...     Ref Range & Units 5d ago 7mo ago    Hemoglobin A1C 4.8 - 5.6 % 7.4   7.8CM     Comments:          Pre-diabetes: 5.7 - 6.4            Diabetes: >6.4            Glycemic control for adults with diabetes: <7.0            Basic metabolic panel   Order: [id number]   Status:  Final result   Visible to patient:  No (Not Released) Next appt:  None Dx:  Benign hypertension     Ref Range & Units 5d ago 7mo ago    Glucose 65 - 99 mg/dL 142   146      BUN, Bld 8 - 27 mg/dL 12  12     Creatinine 0.76 - 1.27 mg/dL 0.96  1.08R      eGFR if non African American >59 mL/min/1.73 82  54      eGFR if African American >59 mL/min/1.73 95  62     BUN/Creatinine Ratio 10 - 24 13  11R      Sodium 134 - 144 mmol/L 144  141     Potassium 3.5 - 5.2 mmol/L 4.5  4.3     Chloride 96 - 106 mmol/L 106  101     CO2 18 - 29 mmol/L 23  18     Calcium 8.6 - 10.2 mg/dL 8.8  9.3R          ALT (SGPT)   Order:  "[id number]   Status:  Final result   Visible to patient:  No (Not Released) Next appt:  None Dx:  Hyperlipidemia, unspecified hyperlipi...     Ref Range & Units 5d ago 7mo ago    ALT 0 - 44 IU/L 21  38R           Unfortunately patient's blood sugars are hard well under control.    I'm not sure if there is any medication related problem or patient simply is noncompliant and does not completely understand his medical conditions.      1. Paroxysmal atrial fibrillation    2. Mixed hyperlipidemia    3. Type 2 diabetes mellitus with other diabetic arthropathy, with long-term current use of insulin    4. Fatty liver disease, nonalcoholic    5. Benign hypertension    6. Influenza vaccine administered    7. Pneumococcal vaccine administered         Plan:           Paroxysmal atrial fibrillation    Mixed hyperlipidemia    Type 2 diabetes mellitus with other diabetic arthropathy, with long-term current use of insulin  -     blood sugar diagnostic (BLOOD GLUCOSE TEST) Strp; 1 strip by Misc.(Non-Drug; Combo Route) route 2 (two) times daily before meals.  Dispense: 200 strip; Refill: 2  -     pen needle, diabetic (BD ULTRA-FINE SHORT PEN NEEDLE) 31 gauge x 5/16" Ndle; 1 each by Subdermal route once daily.  Dispense: 100 each; Refill: 3  -     Hemoglobin A1c; Future; Expected date: 10/25/2018    Fatty liver disease, nonalcoholic    Benign hypertension    Influenza vaccine administered  -     Influenza - High Dose (65+) (PF) (IM)    Pneumococcal vaccine administered  -     (In Office Administered) Pneumococcal Polysaccharide Vaccine (23 Valent) (SQ/IM)    Other orders  -     Cancel: (In Office Administered) Pneumococcal Polysaccharide Vaccine (23 Valent) (SQ/IM)    Not sure why patient is blaming injection Lantus for lack of efficacy. I've advised him to monitor blood sugars more accurately for the next 2 weeks on more or less similar diet with and without Lantus on certain days.    Not sure of this particular batch of injection " Lantus is ineffective or denatured.     alternative strategies would be SGLT2 or longer acting insulins like Tresiba or Toujeo. Patient has heard about these medications on television and is also concerned about potential lawsuits on these medications. Changes in medications will depend upon insurance coverage also. Patient is advised that not everything that we desire or which will be necessarily covered by insurance.    He also wants to change his eye specialist because of costs incurred with an ineffective change in prescription.    Recent eye exam no evidence of retinopathy    fup 3 month      Hemoglobin A1C 4.8 - 5.6 % 8.7 Abnormally high   8.6 Abnormally high  CM 7.4 Abnormally high  CM 7.8 Abnormally high  CM   Comment:          Prediabetes: 5.7 - 6.4        Current Outpatient Medications on File Prior to Visit   Medication Sig Dispense Refill    aspirin (ECOTRIN) 81 MG EC tablet Take 1 tablet by mouth Daily.      cyanocobalamin (VITAMIN B-12) 500 MCG tablet Take 500 mcg by mouth once daily.      ELIQUIS 5 mg Tab Take 5 mg by mouth 2 (two) times daily.  3    esomeprazole (NEXIUM) 20 MG capsule Take 20 mg by mouth before breakfast.      fluticasone (FLONASE) 50 mcg/actuation nasal spray USE 2 SPRAYS TO EACH NOSTRIL ONCE A DAY 3 Bottle 3    insulin glargine (LANTUS SOLOSTAR) 100 unit/mL (3 mL) InPn pen Inject 20 Units into the skin every evening. 24 mL 3    losartan (COZAAR) 50 MG tablet TAKE 1 TABLET EVERY DAY 90 tablet 2    lovastatin (MEVACOR) 10 MG tablet Take 1 tablet (10 mg total) by mouth every evening. 90 tablet 3    metFORMIN (GLUCOPHAGE-XR) 500 MG 24 hr tablet TAKE 1 TABLET TWICE DAILY (DOSE INCREASED TWICE DAILY) 180 tablet 3    metoprolol tartrate (LOPRESSOR) 25 MG tablet       nateglinide (STARLIX) 60 MG tablet TAKE 1 TABLET THREE TIMES DAILY BEFORE MEALS 270 tablet 3    SHINGRIX, PF, 50 mcg/0.5 mL injection TO BE ADMINISTERED BY PHARMACIST FOR IMMUNIZATION  1    [DISCONTINUED] BD  "INSULIN PEN NEEDLE UF SHORT 31 gauge x 5/16" Ndle USE EVERY  each 3    [DISCONTINUED] blood sugar diagnostic (BLOOD GLUCOSE TEST) Strp 1 strip by Misc.(Non-Drug; Combo Route) route 2 (two) times daily before meals. 200 strip 2    [DISCONTINUED] escitalopram oxalate (LEXAPRO) 5 MG Tab Take 5 mg by mouth once daily.       No current facility-administered medications on file prior to visit.        "

## 2018-10-25 NOTE — PATIENT INSTRUCTIONS
Using a Blood Sugar Log    You have diabetes. This means your body has trouble regulating a sugar called glucose. To help manage your diabetes, youll need to check your blood sugar level as directed by your healthcare provider. Keeping a log of your blood sugar levels will help you track your blood sugar readings. Its a simple and easy way to see how well you are controlling your diabetes.  Checking your blood sugar level  You can check your blood sugar level with a blood glucose meter. Youll first prick the side of your finger with a tiny lancet to draw a tiny drop of blood onto the test strip. Some glucose meters let you use another place on your body to test. But these other places should not be used in some cases as they may be inaccurate. Follow the instructions for your glucose meter. And talk with your healthcare provider before doing the test on other places.  The strip goes into the meter first, then a drop of blood is placed on the tip of the strip. The meter then shows a reading that tells you the level of your blood sugar. Your readings should be in your target range as often as possible. This means not too high or too low. Staying in this range helps lower your risk for complications. Your healthcare provider will help you figure out the target range that is best for you.  Tracking your readings  Every time you check your blood sugar, use your log to keep track of your readings. Your meter will also probably have a memory feature that your healthcare provider can check at your next visit. You may be advised by your healthcare provider to check your blood sugar in the morning, at bedtime, and before and after meals. Be sure to write down all of your numbers. Also use your log to record things that might have affected your blood sugar. Some examples include being sick, certain medicines, being physically active, feeling stressed, or skipping meals.   Lessons learned from your readings  Tracking your  blood sugar readings helps you see patterns. These patterns tell you how your actions affect your blood sugar. For instance, you may have higher numbers after eating certain foods or lower numbers after exercise. They just help you understand how to stay in your target range more often, so that your diabetes remains in good control.  Sharing your log with your healthcare team  Bring your blood sugar log and glucose meter with you to all of your healthcare appointments. This can help your healthcare team make changes to your treatment plan, if needed. This may involve making changes in what you eat, what medicines you take, or how much you exercise.  To learn more  The resources below can help you learn more:  · American Diabetes Association 385-878-1852 www.diabetes.org  · [employer] 321-170-2312 www.lighthouse.org  · [employer] [facility] [place] 345-225-3654 www.nei.nih.gov  · [facility] [facility] 926-934-6756 www.hormone.org  Date Last Reviewed: 5/1/2016  © 0538-0003 [employer]. [address] [place] [address], [place] [address]. All rights reserved. This information is not intended as a substitute for professional medical care. Always follow your healthcare professional's instructions.

## 2019-01-12 DIAGNOSIS — E78.5 HYPERLIPIDEMIA, UNSPECIFIED HYPERLIPIDEMIA TYPE: ICD-10-CM

## 2019-01-13 RX ORDER — LOVASTATIN 10 MG/1
10 TABLET ORAL NIGHTLY
Qty: 90 TABLET | Refills: 1 | Status: SHIPPED | OUTPATIENT
Start: 2019-01-13 | End: 2019-01-30 | Stop reason: SINTOL

## 2019-01-24 LAB — HBA1C MFR BLD: 8 % (ref 4.8–5.6)

## 2019-01-28 DIAGNOSIS — E78.5 HYPERLIPIDEMIA, UNSPECIFIED HYPERLIPIDEMIA TYPE: ICD-10-CM

## 2019-01-28 RX ORDER — LOSARTAN POTASSIUM 50 MG/1
50 TABLET ORAL DAILY
Qty: 90 TABLET | Refills: 2 | Status: SHIPPED | OUTPATIENT
Start: 2019-01-28 | End: 2019-01-30 | Stop reason: SDUPTHER

## 2019-01-30 ENCOUNTER — OFFICE VISIT (OUTPATIENT)
Dept: FAMILY MEDICINE | Facility: CLINIC | Age: 69
End: 2019-01-30
Payer: COMMERCIAL

## 2019-01-30 VITALS
DIASTOLIC BLOOD PRESSURE: 80 MMHG | HEART RATE: 61 BPM | BODY MASS INDEX: 28.95 KG/M2 | WEIGHT: 180.13 LBS | TEMPERATURE: 98 F | OXYGEN SATURATION: 98 % | HEIGHT: 66 IN | RESPIRATION RATE: 18 BRPM | SYSTOLIC BLOOD PRESSURE: 132 MMHG

## 2019-01-30 DIAGNOSIS — Z79.4 TYPE 2 DIABETES MELLITUS WITHOUT COMPLICATION, WITH LONG-TERM CURRENT USE OF INSULIN: Primary | ICD-10-CM

## 2019-01-30 DIAGNOSIS — R21 RASH: ICD-10-CM

## 2019-01-30 DIAGNOSIS — E78.5 HYPERLIPIDEMIA, UNSPECIFIED HYPERLIPIDEMIA TYPE: ICD-10-CM

## 2019-01-30 DIAGNOSIS — I10 BENIGN HYPERTENSION: ICD-10-CM

## 2019-01-30 DIAGNOSIS — K21.9 GASTROESOPHAGEAL REFLUX DISEASE WITHOUT ESOPHAGITIS: ICD-10-CM

## 2019-01-30 DIAGNOSIS — E11.9 TYPE 2 DIABETES MELLITUS WITHOUT COMPLICATION, WITH LONG-TERM CURRENT USE OF INSULIN: Primary | ICD-10-CM

## 2019-01-30 DIAGNOSIS — Z79.4 TYPE 2 DIABETES MELLITUS WITH OTHER DIABETIC ARTHROPATHY, WITH LONG-TERM CURRENT USE OF INSULIN: Primary | ICD-10-CM

## 2019-01-30 DIAGNOSIS — E11.618 TYPE 2 DIABETES MELLITUS WITH OTHER DIABETIC ARTHROPATHY, WITH LONG-TERM CURRENT USE OF INSULIN: Primary | ICD-10-CM

## 2019-01-30 PROBLEM — Z23 IMMUNIZATION DUE: Status: ACTIVE | Noted: 2019-01-30

## 2019-01-30 PROCEDURE — 1101F PT FALLS ASSESS-DOCD LE1/YR: CPT | Mod: ,,, | Performed by: INTERNAL MEDICINE

## 2019-01-30 PROCEDURE — 99214 OFFICE O/P EST MOD 30 MIN: CPT | Mod: ,,, | Performed by: INTERNAL MEDICINE

## 2019-01-30 PROCEDURE — 3045F PR MOST RECENT HEMOGLOBIN A1C LEVEL 7.0-9.0%: ICD-10-PCS | Mod: ,,, | Performed by: INTERNAL MEDICINE

## 2019-01-30 PROCEDURE — 3045F PR MOST RECENT HEMOGLOBIN A1C LEVEL 7.0-9.0%: CPT | Mod: ,,, | Performed by: INTERNAL MEDICINE

## 2019-01-30 PROCEDURE — 1101F PR PT FALLS ASSESS DOC 0-1 FALLS W/OUT INJ PAST YR: ICD-10-PCS | Mod: ,,, | Performed by: INTERNAL MEDICINE

## 2019-01-30 PROCEDURE — 99214 PR OFFICE/OUTPT VISIT, EST, LEVL IV, 30-39 MIN: ICD-10-PCS | Mod: ,,, | Performed by: INTERNAL MEDICINE

## 2019-01-30 PROCEDURE — 3079F DIAST BP 80-89 MM HG: CPT | Mod: ,,, | Performed by: INTERNAL MEDICINE

## 2019-01-30 PROCEDURE — 3075F PR MOST RECENT SYSTOLIC BLOOD PRESS GE 130-139MM HG: ICD-10-PCS | Mod: ,,, | Performed by: INTERNAL MEDICINE

## 2019-01-30 PROCEDURE — 3079F PR MOST RECENT DIASTOLIC BLOOD PRESSURE 80-89 MM HG: ICD-10-PCS | Mod: ,,, | Performed by: INTERNAL MEDICINE

## 2019-01-30 PROCEDURE — 3075F SYST BP GE 130 - 139MM HG: CPT | Mod: ,,, | Performed by: INTERNAL MEDICINE

## 2019-01-30 RX ORDER — LOSARTAN POTASSIUM 50 MG/1
50 TABLET ORAL DAILY
Qty: 90 TABLET | Refills: 3 | Status: SHIPPED | OUTPATIENT
Start: 2019-01-30 | End: 2020-02-10

## 2019-01-30 RX ORDER — HYDROGEN PEROXIDE 3 %
20 SOLUTION, NON-ORAL MISCELLANEOUS
Status: CANCELLED | OUTPATIENT
Start: 2019-01-30

## 2019-01-30 RX ORDER — METOPROLOL TARTRATE 25 MG/1
25 TABLET, FILM COATED ORAL 2 TIMES DAILY
Qty: 180 TABLET | Refills: 3 | Status: SHIPPED | OUTPATIENT
Start: 2019-01-30 | End: 2021-03-09 | Stop reason: CLARIF

## 2019-01-30 RX ORDER — INSULIN GLARGINE 100 [IU]/ML
27 INJECTION, SOLUTION SUBCUTANEOUS NIGHTLY
Qty: 3 BOX | Refills: 3 | Status: SHIPPED | OUTPATIENT
Start: 2019-01-30 | End: 2020-02-04

## 2019-01-30 RX ORDER — APIXABAN 5 MG/1
5 TABLET, FILM COATED ORAL 2 TIMES DAILY
Refills: 3 | Status: CANCELLED | OUTPATIENT
Start: 2019-01-30

## 2019-01-30 RX ORDER — METFORMIN HYDROCHLORIDE 500 MG/1
500 TABLET, EXTENDED RELEASE ORAL 2 TIMES DAILY WITH MEALS
Qty: 180 TABLET | Refills: 3 | Status: SHIPPED | OUTPATIENT
Start: 2019-01-30 | End: 2019-07-02 | Stop reason: SDUPTHER

## 2019-01-30 RX ORDER — UBIDECARENONE 75 MG
500 CAPSULE ORAL DAILY
Status: CANCELLED | COMMUNITY
Start: 2019-01-30

## 2019-01-30 RX ORDER — PEN NEEDLE, DIABETIC 30 GX3/16"
1 NEEDLE, DISPOSABLE MISCELLANEOUS 2 TIMES DAILY
Qty: 200 EACH | Refills: 1 | Status: SHIPPED | OUTPATIENT
Start: 2019-01-30 | End: 2019-01-31 | Stop reason: SDUPTHER

## 2019-01-30 RX ORDER — LOVASTATIN 10 MG/1
10 TABLET ORAL NIGHTLY
Qty: 90 TABLET | Refills: 1 | Status: CANCELLED | OUTPATIENT
Start: 2019-01-30

## 2019-01-30 RX ORDER — FLUTICASONE PROPIONATE 50 MCG
SPRAY, SUSPENSION (ML) NASAL
Qty: 3 BOTTLE | Refills: 3 | Status: CANCELLED | OUTPATIENT
Start: 2019-01-30

## 2019-01-30 NOTE — PATIENT INSTRUCTIONS
Diabetes: Activity Tips    Being more active can help you manage your diabetes. The tips on this sheet can help you get the most from your exercise. They can also help you stay safe.  Staying Active  Its important for adults to spend less time sitting and being inactive. This is especially true if you have type 2 diabetes. When you are sitting for long periods of time, get up for short sessions of light activity every 30 minutes.  You should aim for at least 150 minutes a week of exercise or physical activity. Dont let more than 2 days go by without being active.  Benefit from briskness  Brisk activity gets your heart beating faster. This can help you increase your fitness, lose extra weight, and manage your blood sugar level. Try brisk walking. Or, if you have foot or leg problems, you can try swimming or bike riding. You can break up your exercise into chunks throughout the day. Work up to at least 30 minutes of steady, brisk exercise on most days.  Warm up and cool down  Warming up and cooling down reduce your risk of injury. They also help limit muscle soreness. Do a mild version of your activity for 5 minutes before and after your routine. You can also learn stretches that will help keep your muscles loose. Your healthcare provider may show you good ways to warm up and stretch.  Do the talk-sing test  The talk-sing test is a simple way to tell how hard youre exercising. If you can talk while exercising, youre in a safe range. If youre out of breath, slow down. If you can carry a tune, its time to  the pace. Walk up a hill. Increase the resistance on your stationary bike. Or swim faster.  What about eating?  You may be told to plan your exercise for 1 to 2 hours after a meal. In most cases, you dont need to eat while being active. If you take insulin or medicine that can cause low blood sugar, test your blood sugar before exercising. And carry a fast-acting sugar that will raise your blood sugar  level quickly. This includes glucose tablets or hard candy. Use it if you feel low blood sugar symptoms.  Safety tips  These tips can help you stay safe as you become fit:  · Exercise with a friend or carry a cell phone if you have one.  · Carry or wear identification, such as a necklace or bracelet, that says you have diabetes.  · Use the proper footwear and safety equipment for your activity.  · Drink water before, during, and after exercise.  · Dress properly for the weather.  · Dont exercise in very hot or very cold weather.  · Dont exercise if you are sick.  · If you are instructed to do so, test your blood sugar before and after you exercise. Have a small carbohydrate snack if your blood sugar is low before you start exercising.   When to stop exercising and call your healthcare provider  Stop exercising and call your healthcare provider right away if you notice any of the following:  · Pain, pressure, tightness, or heaviness in the chest  · Pain or heaviness in the neck, shoulders, back, arms, legs, or feet  · Unusual shortness of breath  · Dizziness or lightheadedness  · Unusually rapid or slow pulse  · Increased joint or muscle pain  · Nausea or vomiting  Date Last Reviewed: 5/1/2016  © 0703-6194 [employer] [address] [place] [address], [place] [address]. All rights reserved. This information is not intended as a substitute for professional medical care. Always follow your healthcare professional's instructions.        Tips to Control Acid Reflux    To control acid reflux, youll need to make some basic diet and lifestyle changes. The simple steps outlined below may be all youll need to ease discomfort.  Watch what you eat  · Avoid fatty foods and spicy foods.  · Eat fewer acidic foods, such as citrus and tomato-based foods. These can increase symptoms.  · Limit drinking alcohol, caffeine, and fizzy beverages. All increase acid reflux.  · Try limiting chocolate, peppermint, and spearmint. These  "can worsen acid reflux in some people.  Watch when you eat  · Avoid lying down for 3 hours after eating.  · Do not snack before going to bed.  Raise your head  Raising your head and upper body by 4 to 6 inches helps limit reflux when youre lying down. Put blocks under the head of your bed frame to raise it.  Other changes  · Lose weight, if you need to  · Dont exercise near bedtime  · Avoid tight-fitting clothes  · Limit aspirin and ibuprofen  · Stop smoking   Date Last Reviewed: 7/1/2016 © 2000-2017 [employer]. [address] [place] [address]. All rights reserved. This information is not intended as a substitute for professional medical care. Always follow your healthcare professional's instructions.        Facts About Dietary Fat     Olive oil is a good source of unsaturated fat.     Eating less saturated and trans fat is one of the best things you can do for your heart. Start by finding out which fats are better to use. Then always try to use as little "bad" fat as you can.  Why eat less fat?  · Cutting down on the fat you eat can lower your blood cholesterol levels. This may help prevent clogged arteries from buildup of plaque.  · A low-fat diet can help you lose excess weight. Doing so can lower your blood pressure and reduce your chances of getting diabetes.  · A low-fat diet reduces your risk for stroke and for some cancers.  Unsaturated fat is most healthy  · When you must add fat, use unsaturated fat.  · Unsaturated fats come from plants. They include olive, canola, peanut, corn, avocado, safflower, and sunflower oils.  · Liquid (squeezable) margarine is also mostly unsaturated fat.  · In moderate amounts, unsaturated fat can even be good for your heart.  Saturated fat is less healthy  · Avoid eating saturated fat because it raises your blood cholesterol levels.  · Most saturated fat comes from animals. Foods such as butter, lard, cheese, cream, whole milk, and fatty cuts of meat " are high in saturated fat.  · Some oils, such as palm and coconut oils, are also saturated fats.  Trans fat is least healthy  · Also avoid trans fat whenever possible. Even if it's not listed on the food label, look for it in the ingredients in the form of hydrogenated or partially hydrogenated oils.  · This is found in snack foods, shortening, french fries, and stick margarines.  Add flavor without fat  · Sprinkle herbs on fish, chicken, and meat, and in soups.  · Try herbs, lemon juice, or flavored vinegar on vegetables.  · Add chopped onions, garlic, and peppers to flavor beans and rice.   Date Last Reviewed: 5/11/2015  © 6215-9137 [employer]. [address] [place] [address], [place] [address]. All rights reserved. This information is not intended as a substitute for professional medical care. Always follow your healthcare professional's instructions.

## 2019-01-30 NOTE — PROGRESS NOTES
Subjective:       Patient ID: Nicolás Batres is a 68 y.o. male.    Chief Complaint: Diabetes (3 mo f/u); Hyperlipidemia (3 mo f/u); Hypertension; and Rash    Mr Batres is a 68-year-old male patient who comes for follow-up. Underlying issues of diabetes mellitus type 2, hypertension or dyslipidemia been noted. Controlling the blood sugars seem to be still a struggle.    He was recently started on lovastatin but he read the side effects that it raises the blood sugars and he is concerned about it. He has some eczematous rash which is itching in the lower extremities and he read the side effects of lovastatin causing rash like symptoms and he attributes this to lovastatin. He is wondering why I put him on lovastatin.    He is wondering why he is not getting better in spite of exercising and watching his diet and medications. He is appaled and disheartened that his hemoglobin A1c has come down from 8.7 to 8.0 only and why he is not completely cured by this time. He also wonders why nobody in his family has diabetes and he has to have diabetes, blood pressure dyslipidemia.    He has taken the first shingles vaccine but is afraid of taking the second shingles vaccine as a booster dose because of potential side effects that he has read.      Diabetes   He presents for his follow-up diabetic visit. He has type 2 diabetes mellitus. His disease course has been improving. Hypoglycemia symptoms include nervousness/anxiousness. Pertinent negatives for hypoglycemia include no dizziness, seizures, speech difficulty or tremors. Pertinent negatives for diabetes include no chest pain, no fatigue, no polydipsia, no polyphagia, no polyuria, no visual change and no weakness. There are no hypoglycemic complications. Symptoms are stable. Risk factors for coronary artery disease include sedentary lifestyle, hypertension and diabetes mellitus. Current diabetic treatment includes insulin injections. He is compliant with treatment most of the  time. His weight is stable. Meal planning includes avoidance of concentrated sweets. He has not had a previous visit with a dietitian. He participates in exercise intermittently. His home blood glucose trend is decreasing steadily. An ACE inhibitor/angiotensin II receptor blocker is being taken. Eye exam is current.   Hyperlipidemia   This is a chronic problem. The current episode started more than 1 year ago. He has no history of obesity. Pertinent negatives include no chest pain. Current antihyperlipidemic treatment includes statins. Risk factors for coronary artery disease include a sedentary lifestyle, hypertension, male sex and dyslipidemia.   Hypertension   This is a chronic problem. The current episode started more than 1 year ago. Pertinent negatives include no chest pain. Risk factors for coronary artery disease include male gender, diabetes mellitus and dyslipidemia. Past treatments include angiotensin blockers. The current treatment provides moderate improvement. There is no history of hyperaldosteronism or renovascular disease.   Rash   This is a new problem. The current episode started more than 1 month ago. The problem is unchanged. The affected locations include the left lower leg and right lower leg. Rash characteristics: eczematoid rash. Pertinent negatives include no congestion, cough, diarrhea, eye pain or fatigue. Past treatments include nothing. His past medical history is significant for allergies (pollen extracts). There is no history of asthma.       Past Medical History:   Diagnosis Date    Allergy     pollen extracts    Atrial fibrillation     Chronic anticoagulation     Diabetes mellitus type I     Hypertension      Social History     Socioeconomic History    Marital status:      Spouse name: Not on file    Number of children: 2    Years of education: Not on file    Highest education level: Not on file   Social Needs    Financial resource strain: Not on file    Food  insecurity - worry: Not on file    Food insecurity - inability: Not on file    Transportation needs - medical: Not on file    Transportation needs - non-medical: Not on file   Occupational History    Occupation: [employer]     Employer: [employer]   Tobacco Use    Smoking status: Never Smoker    Smokeless tobacco: Never Used   Substance and Sexual Activity    Alcohol use: Yes    Drug use: No    Sexual activity: Yes     Partners: Female   Other Topics Concern    Not on file   Social History Narrative    Not on file     History reviewed. No pertinent surgical history.  Family History   Problem Relation Age of Onset    Abnormal EKG Mother     Diabetes Father     Heart disease Father     Hypertension Father        Review of Systems   Constitutional: Positive for unexpected weight change (gaining wt steadily). Negative for activity change, appetite change and fatigue.   HENT: Negative for congestion, sneezing and trouble swallowing.    Eyes: Negative for pain and visual disturbance.   Respiratory: Negative for cough and chest tightness.    Cardiovascular: Negative for chest pain and leg swelling.        HTN +ve but Normal Lipids   Gastrointestinal: Negative for abdominal distention, abdominal pain, blood in stool, constipation and diarrhea.        Hep C screen was positive.   Endocrine: Negative for cold intolerance, heat intolerance, polydipsia, polyphagia and polyuria.        DM-elevated triglycerides. LDL cholesterol is92   Genitourinary: Negative for dysuria.   Skin: Positive for rash.        Rash on the medial side of lower extremities.   Allergic/Immunologic: Negative for environmental allergies, food allergies and immunocompromised state.   Neurological: Negative for dizziness, tremors, seizures, speech difficulty, weakness, light-headedness and numbness.   Hematological: Negative for adenopathy. Does not bruise/bleed easily.   Psychiatric/Behavioral: The patient is nervous/anxious.        HEP C  "Confirm was negative  Objective:      Blood pressure 132/80, pulse 61, temperature 97.6 °F (36.4 °C), resp. rate 18, height 5' 6" (1.676 m), weight 81.7 kg (180 lb 1.6 oz), SpO2 98 %. Body mass index is 29.07 kg/m².  Physical Exam   Constitutional: He appears well-developed.   HENT:   Head: Normocephalic and atraumatic.   Nose: Nose normal.   Mouth/Throat: Oropharynx is clear and moist. No oropharyngeal exudate.   Eyes: Conjunctivae and EOM are normal.   Neck: Neck supple. No JVD present. No tracheal deviation present. No thyromegaly present.   Cardiovascular: Normal rate, regular rhythm and normal heart sounds. Exam reveals no gallop and no friction rub.   No murmur heard.  Pulmonary/Chest: Effort normal and breath sounds normal. No respiratory distress. He has no wheezes. He has no rales.   Abdominal: Soft. Bowel sounds are normal. He exhibits no distension. There is no tenderness.   Neurological: He is alert. He has normal reflexes.   Skin: Skin is warm and dry. Rash noted. Rash is papular.        Fine papular lesions are noticed in the right lower extremity with slight eczematoid changes. These are more pronounced on the right side as compared to the left side. The left-sided lesion seem to have practically vanished only. These do not exhibit any linear pattern which could be suggestive of exposure dermatitis. They're not in any dermatomal pattern to indicate shingles and there are bilateral of course.   Psychiatric: He has a normal mood and affect.   Somewhat incongruent   Nursing note and vitals reviewed.        Assessment:       1. Type 2 diabetes mellitus with other diabetic arthropathy, with long-term current use of insulin    2. Hyperlipidemia, unspecified hyperlipidemia type    3. Gastroesophageal reflux disease without esophagitis    4. Benign hypertension    5. Rash           No visits with results within 3 Month(s) from this visit.   Latest known visit with results is:   Office Visit on 10/25/2018 "   Component Date Value Ref Range Status    Hemoglobin A1C 01/23/2019 8.0* 4.8 - 5.6 % Final     Long discussion with the patient which was a fruitless discussion about diabetes and eventual progression and difficulty controlling over a period of time.( His hemoglobin A1c did show improvement from 8.7-8.0.)    Secondly his suspicion on medications and their side effects makes it more challenging to treat him accordingly.    I had a very hard time explaining to him that diabetes does not always have to have a  genetic basis and in spite of the best efforts to exercise and watch diet , the progression of this might be inevitable.    I also explained to him, that Diabetes needs to be treated in conjunction with BP and lipids. They cannot be left alone.    While slight elevation of patient's of blood sugar is possible with statin medications, in the overall long-term picture bringing the LDL cholesterol level down as more benefits than a slight rise in blood sugars.     Plan:           Type 2 diabetes mellitus with other diabetic arthropathy, with long-term current use of insulin  -     blood sugar diagnostic (BLOOD GLUCOSE TEST) Strp; 1 strip by Misc.(Non-Drug; Combo Route) route 2 (two) times daily before meals.  Dispense: 200 strip; Refill: 2  -     insulin glargine 100 units/mL (3mL) SubQ pen; Inject 27 Units into the skin every evening.  Dispense: 3 Box; Refill: 3  -     metFORMIN (GLUCOPHAGE-XR) 500 MG 24 hr tablet; Take 1 tablet (500 mg total) by mouth 2 (two) times daily with meals.  Dispense: 180 tablet; Refill: 3  -     Ambulatory referral to Endocrinology    Hyperlipidemia, unspecified hyperlipidemia type    Gastroesophageal reflux disease without esophagitis    Benign hypertension  -     metoprolol tartrate (LOPRESSOR) 25 MG tablet; Take 1 tablet (25 mg total) by mouth 2 (two) times daily.  Dispense: 180 tablet; Refill: 3  -     losartan (COZAAR) 50 MG tablet; Take 1 tablet (50 mg total) by mouth once daily.   Dispense: 90 tablet; Refill: 3    Rash    Other orders  -     Cancel: Tdap Vaccine  -     Cancel: cyanocobalamin (VITAMIN B-12) 500 MCG tablet; Take 1 tablet (500 mcg total) by mouth once daily.  -     Cancel: ELIQUIS 5 mg Tab; Take 1 tablet (5 mg total) by mouth 2 (two) times daily.; Refill: 3  -     Cancel: esomeprazole (NEXIUM) 20 MG capsule; Take 1 capsule (20 mg total) by mouth before breakfast.  -     Cancel: fluticasone (FLONASE) 50 mcg/actuation nasal spray; USE 2 SPRAYS TO EACH NOSTRIL ONCE A DAY  Dispense: 3 Bottle; Refill: 3  -     Cancel: lovastatin (MEVACOR) 10 MG tablet; Take 1 tablet (10 mg total) by mouth every evening.  Dispense: 90 tablet; Refill: 1      I've refilled patient's medications.    I do not know how to address patient's desires not to be on any medications and yet continue to treat him and resolve all his issues of hypertension,dyslipidemia and diabetes.    I'm even sceptical  of giving him any vaccinations today because of potential side effects and their deleterious effects that he might attribute and the fall out blame games    I've advised him and welcomed him to seek a second opinion either from a primary care physician/ Holistic or specialist concerning his medical issues and the best course for treatment.    Referral for endocrinologist also has been made at this point.    Hold off the lovastatin. While eczematoid rash could have been treated with cortisone, I'm concerned about his worry of rising sugars with cortisone or steroid.    Follow-up with me in 6 months time or at his well and desire.  Pt seen with Ms Mendez RN NP student.      Current Outpatient Medications:     aspirin (ECOTRIN) 81 MG EC tablet, Take 1 tablet by mouth Daily., Disp: , Rfl:     blood sugar diagnostic (BLOOD GLUCOSE TEST) Strp, 1 strip by Misc.(Non-Drug; Combo Route) route 2 (two) times daily before meals., Disp: 200 strip, Rfl: 2    cyanocobalamin (VITAMIN B-12) 500 MCG tablet, Take 500 mcg by mouth  "once daily., Disp: , Rfl:     ELIQUIS 5 mg Tab, Take 5 mg by mouth 2 (two) times daily., Disp: , Rfl: 3    esomeprazole (NEXIUM) 20 MG capsule, Take 20 mg by mouth before breakfast., Disp: , Rfl:     fluticasone (FLONASE) 50 mcg/actuation nasal spray, USE 2 SPRAYS TO EACH NOSTRIL ONCE A DAY, Disp: 3 Bottle, Rfl: 3    insulin glargine 100 units/mL (3mL) SubQ pen, Inject 27 Units into the skin every evening., Disp: 3 Box, Rfl: 3    losartan (COZAAR) 50 MG tablet, Take 1 tablet (50 mg total) by mouth once daily., Disp: 90 tablet, Rfl: 3    metFORMIN (GLUCOPHAGE-XR) 500 MG 24 hr tablet, Take 1 tablet (500 mg total) by mouth 2 (two) times daily with meals., Disp: 180 tablet, Rfl: 3    metoprolol tartrate (LOPRESSOR) 25 MG tablet, Take 1 tablet (25 mg total) by mouth 2 (two) times daily., Disp: 180 tablet, Rfl: 3    nateglinide (STARLIX) 60 MG tablet, TAKE 1 TABLET THREE TIMES DAILY BEFORE MEALS, Disp: 270 tablet, Rfl: 3    SHINGRIX, PF, 50 mcg/0.5 mL injection, TO BE ADMINISTERED BY PHARMACIST FOR IMMUNIZATION, Disp: , Rfl: 1    pen needle, diabetic (BD ULTRA-FINE YULISSA PEN NEEDLE) 32 gauge x 5/32" Ndle, 1 application by Misc.(Non-Drug; Combo Route) route 2 (two) times daily., Disp: 200 each, Rfl: 1  "

## 2019-01-31 DIAGNOSIS — E11.9 TYPE 2 DIABETES MELLITUS WITHOUT COMPLICATION, WITH LONG-TERM CURRENT USE OF INSULIN: ICD-10-CM

## 2019-01-31 DIAGNOSIS — Z79.4 TYPE 2 DIABETES MELLITUS WITHOUT COMPLICATION, WITH LONG-TERM CURRENT USE OF INSULIN: ICD-10-CM

## 2019-01-31 RX ORDER — PEN NEEDLE, DIABETIC 30 GX3/16"
1 NEEDLE, DISPOSABLE MISCELLANEOUS 2 TIMES DAILY
Qty: 200 EACH | Refills: 1 | Status: SHIPPED | OUTPATIENT
Start: 2019-01-31 | End: 2020-10-07 | Stop reason: SDUPTHER

## 2019-07-02 ENCOUNTER — OFFICE VISIT (OUTPATIENT)
Dept: ENDOCRINOLOGY | Facility: CLINIC | Age: 69
End: 2019-07-02
Payer: MEDICARE

## 2019-07-02 ENCOUNTER — LAB VISIT (OUTPATIENT)
Dept: LAB | Facility: HOSPITAL | Age: 69
End: 2019-07-02
Attending: INTERNAL MEDICINE
Payer: MEDICARE

## 2019-07-02 VITALS
SYSTOLIC BLOOD PRESSURE: 118 MMHG | BODY MASS INDEX: 29.27 KG/M2 | WEIGHT: 182.13 LBS | TEMPERATURE: 98 F | HEIGHT: 66 IN | HEART RATE: 60 BPM | DIASTOLIC BLOOD PRESSURE: 78 MMHG

## 2019-07-02 DIAGNOSIS — I48.0 PAROXYSMAL ATRIAL FIBRILLATION: ICD-10-CM

## 2019-07-02 DIAGNOSIS — Z79.4 TYPE 2 DIABETES MELLITUS WITH HYPERGLYCEMIA, WITH LONG-TERM CURRENT USE OF INSULIN: Primary | ICD-10-CM

## 2019-07-02 DIAGNOSIS — E11.65 TYPE 2 DIABETES MELLITUS WITH HYPERGLYCEMIA, WITH LONG-TERM CURRENT USE OF INSULIN: Primary | ICD-10-CM

## 2019-07-02 DIAGNOSIS — Z79.4 TYPE 2 DIABETES MELLITUS WITH HYPERGLYCEMIA, WITH LONG-TERM CURRENT USE OF INSULIN: ICD-10-CM

## 2019-07-02 DIAGNOSIS — Z79.4 TYPE 2 DIABETES MELLITUS WITH OTHER DIABETIC ARTHROPATHY, WITH LONG-TERM CURRENT USE OF INSULIN: ICD-10-CM

## 2019-07-02 DIAGNOSIS — N40.0 BENIGN PROSTATIC HYPERPLASIA, UNSPECIFIED WHETHER LOWER URINARY TRACT SYMPTOMS PRESENT: ICD-10-CM

## 2019-07-02 DIAGNOSIS — E53.8 VITAMIN B12 DEFICIENCY: ICD-10-CM

## 2019-07-02 DIAGNOSIS — K21.9 GASTROESOPHAGEAL REFLUX DISEASE WITHOUT ESOPHAGITIS: ICD-10-CM

## 2019-07-02 DIAGNOSIS — E55.9 HYPOVITAMINOSIS D: Primary | ICD-10-CM

## 2019-07-02 DIAGNOSIS — E55.9 HYPOVITAMINOSIS D: ICD-10-CM

## 2019-07-02 DIAGNOSIS — E78.5 HYPERLIPIDEMIA, UNSPECIFIED HYPERLIPIDEMIA TYPE: ICD-10-CM

## 2019-07-02 DIAGNOSIS — K76.0 FATTY LIVER DISEASE, NONALCOHOLIC: ICD-10-CM

## 2019-07-02 DIAGNOSIS — I10 BENIGN HYPERTENSION: ICD-10-CM

## 2019-07-02 DIAGNOSIS — B02.9 HERPES ZOSTER WITHOUT COMPLICATION: ICD-10-CM

## 2019-07-02 DIAGNOSIS — E11.618 TYPE 2 DIABETES MELLITUS WITH OTHER DIABETIC ARTHROPATHY, WITH LONG-TERM CURRENT USE OF INSULIN: ICD-10-CM

## 2019-07-02 DIAGNOSIS — E11.65 TYPE 2 DIABETES MELLITUS WITH HYPERGLYCEMIA, WITH LONG-TERM CURRENT USE OF INSULIN: ICD-10-CM

## 2019-07-02 PROBLEM — E79.0 HYPERURICEMIA: Status: ACTIVE | Noted: 2019-07-02

## 2019-07-02 LAB
25(OH)D3+25(OH)D2 SERPL-MCNC: 13 NG/ML (ref 30–96)
ALBUMIN SERPL BCP-MCNC: 4 G/DL (ref 3.5–5.2)
ALP SERPL-CCNC: 78 U/L (ref 55–135)
ALT SERPL W/O P-5'-P-CCNC: 38 U/L (ref 10–44)
AMYLASE SERPL-CCNC: 77 U/L (ref 20–110)
ANION GAP SERPL CALC-SCNC: 10 MMOL/L (ref 8–16)
AST SERPL-CCNC: 30 U/L (ref 10–40)
BASOPHILS # BLD AUTO: 0.05 K/UL (ref 0–0.2)
BASOPHILS NFR BLD: 0.7 % (ref 0–1.9)
BILIRUB SERPL-MCNC: 1 MG/DL (ref 0.1–1)
BUN SERPL-MCNC: 16 MG/DL (ref 8–23)
CA-I BLDV-SCNC: 1.24 MMOL/L (ref 1.06–1.42)
CALCIUM SERPL-MCNC: 9.4 MG/DL (ref 8.7–10.5)
CHLORIDE SERPL-SCNC: 106 MMOL/L (ref 95–110)
CHOLEST SERPL-MCNC: 193 MG/DL (ref 120–199)
CHOLEST/HDLC SERPL: 4.6 {RATIO} (ref 2–5)
CO2 SERPL-SCNC: 24 MMOL/L (ref 23–29)
CREAT SERPL-MCNC: 1.2 MG/DL (ref 0.5–1.4)
DIFFERENTIAL METHOD: ABNORMAL
EOSINOPHIL # BLD AUTO: 0.2 K/UL (ref 0–0.5)
EOSINOPHIL NFR BLD: 2.8 % (ref 0–8)
ERYTHROCYTE [DISTWIDTH] IN BLOOD BY AUTOMATED COUNT: 12.5 % (ref 11.5–14.5)
EST. GFR  (AFRICAN AMERICAN): >60 ML/MIN/1.73 M^2
EST. GFR  (NON AFRICAN AMERICAN): >60 ML/MIN/1.73 M^2
ESTIMATED AVG GLUCOSE: 186 MG/DL (ref 68–131)
FOLATE SERPL-MCNC: 12 NG/ML (ref 4–24)
GGT SERPL-CCNC: 49 U/L (ref 8–55)
GLUCOSE SERPL-MCNC: 169 MG/DL (ref 70–110)
HBA1C MFR BLD HPLC: 8.1 % (ref 4–5.6)
HCT VFR BLD AUTO: 44.2 % (ref 40–54)
HDLC SERPL-MCNC: 42 MG/DL (ref 40–75)
HDLC SERPL: 21.8 % (ref 20–50)
HGB BLD-MCNC: 14.3 G/DL (ref 14–18)
IMM GRANULOCYTES # BLD AUTO: 0.09 K/UL (ref 0–0.04)
IMM GRANULOCYTES NFR BLD AUTO: 1.2 % (ref 0–0.5)
LDLC SERPL CALC-MCNC: 100.8 MG/DL (ref 63–159)
LIPASE SERPL-CCNC: 52 U/L (ref 4–60)
LYMPHOCYTES # BLD AUTO: 1.9 K/UL (ref 1–4.8)
LYMPHOCYTES NFR BLD: 24.7 % (ref 18–48)
MCH RBC QN AUTO: 29.7 PG (ref 27–31)
MCHC RBC AUTO-ENTMCNC: 32.4 G/DL (ref 32–36)
MCV RBC AUTO: 92 FL (ref 82–98)
MONOCYTES # BLD AUTO: 0.4 K/UL (ref 0.3–1)
MONOCYTES NFR BLD: 5.5 % (ref 4–15)
NEUTROPHILS # BLD AUTO: 5 K/UL (ref 1.8–7.7)
NEUTROPHILS NFR BLD: 65.1 % (ref 38–73)
NONHDLC SERPL-MCNC: 151 MG/DL
NRBC BLD-RTO: 0 /100 WBC
PLATELET # BLD AUTO: 186 K/UL (ref 150–350)
PMV BLD AUTO: 12.2 FL (ref 9.2–12.9)
POTASSIUM SERPL-SCNC: 4 MMOL/L (ref 3.5–5.1)
PROSTATE SPECIFIC ANTIGEN, TOTAL: 1 NG/ML (ref 0–4)
PROT SERPL-MCNC: 7.3 G/DL (ref 6–8.4)
PSA FREE MFR SERPL: 33 %
PSA FREE SERPL-MCNC: 0.33 NG/ML (ref 0.01–1.5)
PTH-INTACT SERPL-MCNC: 58 PG/ML (ref 9–77)
RBC # BLD AUTO: 4.82 M/UL (ref 4.6–6.2)
SODIUM SERPL-SCNC: 140 MMOL/L (ref 136–145)
TRIGL SERPL-MCNC: 251 MG/DL (ref 30–150)
URATE SERPL-MCNC: 7.2 MG/DL (ref 3.4–7)
VIT B12 SERPL-MCNC: >2000 PG/ML (ref 210–950)
WBC # BLD AUTO: 7.61 K/UL (ref 3.9–12.7)

## 2019-07-02 PROCEDURE — 84378 SUGARS SINGLE QUANT: CPT

## 2019-07-02 PROCEDURE — 99204 PR OFFICE/OUTPT VISIT, NEW, LEVL IV, 45-59 MIN: ICD-10-PCS | Mod: S$GLB,,, | Performed by: INTERNAL MEDICINE

## 2019-07-02 PROCEDURE — 84154 ASSAY OF PSA FREE: CPT

## 2019-07-02 PROCEDURE — 82747 ASSAY OF FOLIC ACID RBC: CPT

## 2019-07-02 PROCEDURE — 82607 VITAMIN B-12: CPT

## 2019-07-02 PROCEDURE — 86787 VARICELLA-ZOSTER ANTIBODY: CPT

## 2019-07-02 PROCEDURE — 3045F PR MOST RECENT HEMOGLOBIN A1C LEVEL 7.0-9.0%: ICD-10-PCS | Mod: CPTII,S$GLB,, | Performed by: INTERNAL MEDICINE

## 2019-07-02 PROCEDURE — 82308 ASSAY OF CALCITONIN: CPT

## 2019-07-02 PROCEDURE — 3078F DIAST BP <80 MM HG: CPT | Mod: CPTII,S$GLB,, | Performed by: INTERNAL MEDICINE

## 2019-07-02 PROCEDURE — 82977 ASSAY OF GGT: CPT

## 2019-07-02 PROCEDURE — 82985 ASSAY OF GLYCATED PROTEIN: CPT

## 2019-07-02 PROCEDURE — 1101F PT FALLS ASSESS-DOCD LE1/YR: CPT | Mod: CPTII,S$GLB,, | Performed by: INTERNAL MEDICINE

## 2019-07-02 PROCEDURE — 82746 ASSAY OF FOLIC ACID SERUM: CPT

## 2019-07-02 PROCEDURE — 36415 COLL VENOUS BLD VENIPUNCTURE: CPT | Mod: PO

## 2019-07-02 PROCEDURE — 83036 HEMOGLOBIN GLYCOSYLATED A1C: CPT

## 2019-07-02 PROCEDURE — 1101F PR PT FALLS ASSESS DOC 0-1 FALLS W/OUT INJ PAST YR: ICD-10-PCS | Mod: CPTII,S$GLB,, | Performed by: INTERNAL MEDICINE

## 2019-07-02 PROCEDURE — 82306 VITAMIN D 25 HYDROXY: CPT

## 2019-07-02 PROCEDURE — 83690 ASSAY OF LIPASE: CPT

## 2019-07-02 PROCEDURE — 82607 VITAMIN B-12: CPT | Mod: 91

## 2019-07-02 PROCEDURE — 99999 PR PBB SHADOW E&M-EST. PATIENT-LVL IV: ICD-10-PCS | Mod: PBBFAC,,, | Performed by: INTERNAL MEDICINE

## 2019-07-02 PROCEDURE — 82330 ASSAY OF CALCIUM: CPT

## 2019-07-02 PROCEDURE — 99204 OFFICE O/P NEW MOD 45 MIN: CPT | Mod: S$GLB,,, | Performed by: INTERNAL MEDICINE

## 2019-07-02 PROCEDURE — 3045F PR MOST RECENT HEMOGLOBIN A1C LEVEL 7.0-9.0%: CPT | Mod: CPTII,S$GLB,, | Performed by: INTERNAL MEDICINE

## 2019-07-02 PROCEDURE — 80061 LIPID PANEL: CPT

## 2019-07-02 PROCEDURE — 83970 ASSAY OF PARATHORMONE: CPT

## 2019-07-02 PROCEDURE — 84550 ASSAY OF BLOOD/URIC ACID: CPT

## 2019-07-02 PROCEDURE — 80053 COMPREHEN METABOLIC PANEL: CPT

## 2019-07-02 PROCEDURE — 3074F SYST BP LT 130 MM HG: CPT | Mod: CPTII,S$GLB,, | Performed by: INTERNAL MEDICINE

## 2019-07-02 PROCEDURE — 3074F PR MOST RECENT SYSTOLIC BLOOD PRESSURE < 130 MM HG: ICD-10-PCS | Mod: CPTII,S$GLB,, | Performed by: INTERNAL MEDICINE

## 2019-07-02 PROCEDURE — 3078F PR MOST RECENT DIASTOLIC BLOOD PRESSURE < 80 MM HG: ICD-10-PCS | Mod: CPTII,S$GLB,, | Performed by: INTERNAL MEDICINE

## 2019-07-02 PROCEDURE — 85025 COMPLETE CBC W/AUTO DIFF WBC: CPT

## 2019-07-02 PROCEDURE — 99999 PR PBB SHADOW E&M-EST. PATIENT-LVL IV: CPT | Mod: PBBFAC,,, | Performed by: INTERNAL MEDICINE

## 2019-07-02 PROCEDURE — 86787 VARICELLA-ZOSTER ANTIBODY: CPT | Mod: 91

## 2019-07-02 PROCEDURE — 82150 ASSAY OF AMYLASE: CPT

## 2019-07-02 RX ORDER — NATEGLINIDE 120 MG/1
120 TABLET ORAL
Qty: 270 TABLET | Refills: 3 | Status: SHIPPED | OUTPATIENT
Start: 2019-07-02 | End: 2019-07-02 | Stop reason: ALTCHOICE

## 2019-07-02 RX ORDER — METFORMIN HYDROCHLORIDE 500 MG/1
1000 TABLET, EXTENDED RELEASE ORAL 2 TIMES DAILY WITH MEALS
Qty: 180 TABLET | Refills: 3 | Status: SHIPPED | OUTPATIENT
Start: 2019-07-02 | End: 2019-11-04 | Stop reason: SDUPTHER

## 2019-07-02 RX ORDER — ERGOCALCIFEROL 1.25 MG/1
50000 CAPSULE ORAL
Qty: 12 CAPSULE | Refills: 3 | Status: SHIPPED | OUTPATIENT
Start: 2019-07-02 | End: 2019-09-30

## 2019-07-02 NOTE — PROGRESS NOTES
Subjective:      Patient ID: Cyrus Batres Jr. is a 68 y.o. male.    Chief Complaint:  Diabetes    Patient is a 68 yr old gentleman with type 2 diabetes seen for initial care visit today.    History of Present Illness    Patient is a 68 yr old gentleman seen for initial care visit today for optimization of diabetes care.  His most recent HBA1c from 01/19 was 8.0 and his current antidiabetic regimen consists of  lantus insulin ??27 units QHS, starlix 60mg TID and metformin XR  500mg BID.  His associated comorbidities are detailed below;    1. Type 2 diabetes mellitus with hyperglycemia, with long-term current use of insulin     2. Benign hypertension     3. Paroxysmal atrial fibrillation     4. Hyperlipidemia, unspecified hyperlipidemia type     5. Fatty liver disease, nonalcoholic     6. Gastroesophageal reflux disease without esophagitis     7. Vitamin B12 deficiency       Patients Winchester score is 1.  Patient has been diabetic for ~ 11 yrs now.  Patients father also had diabetes.  Patient has been doing SMBGs 2-3 (aaccuchek nigel) Range is 110 - 220 range.  Patient has never had a heart attack and is being ffed by cardiology.  Patient had last opthal  appt with Dr Willett.                 Review of Systems   Constitutional: Negative for activity change, diaphoresis, fatigue, fever and unexpected weight change.   HENT: Negative for facial swelling, hearing loss, mouth sores, tinnitus, trouble swallowing and voice change.    Eyes: Negative for photophobia, pain, redness and visual disturbance (Has some visual blurring; chronic).   Respiratory: Negative for cough and shortness of breath.    Cardiovascular: Negative for chest pain, palpitations and leg swelling.   Gastrointestinal: Negative for abdominal distention, abdominal pain, constipation, diarrhea, nausea and vomiting.   Endocrine: Negative for cold intolerance, heat intolerance, polydipsia, polyphagia and polyuria.   Genitourinary: Negative for dysuria, flank  "pain, frequency, hematuria and urgency.   Musculoskeletal: Negative for arthralgias, back pain, gait problem, joint swelling, myalgias and neck pain.   Skin: Negative for color change, pallor and rash (none currently but has history of possible recurrent zosteriform eruptions on lower limbs.).   Neurological: Negative for dizziness, tremors, seizures, syncope, light-headedness, numbness and headaches.   Hematological: Does not bruise/bleed easily.   Psychiatric/Behavioral: Negative for agitation, confusion, dysphoric mood and sleep disturbance. The patient is not nervous/anxious and is not hyperactive.        Objective: /78 (BP Location: Left arm, Patient Position: Sitting, BP Method: Medium (Manual))   Pulse 60   Temp 97.9 °F (36.6 °C) (Oral)   Ht 5' 6" (1.676 m)   Wt 82.6 kg (182 lb 1.6 oz)   BMI 29.39 kg/m²  Body surface area is 1.96 meters squared.         Physical Exam   Constitutional: He is oriented to person, place, and time. Vital signs are normal. He appears well-developed and well-nourished.  Non-toxic appearance. No distress.   Pleasant elderly gentleman who looks significantly younger than chronologic age. Not pale, anicteric and afebrile. Well hydrated. Not in any acute distress.   HENT:   Head: Normocephalic and atraumatic. Head is without right periorbital erythema and without left periorbital erythema. Hair is normal.   Mouth/Throat: No oropharyngeal exudate.   Mallampati grade 2 fauces and no nuchal AN.   Eyes: Pupils are equal, round, and reactive to light. Conjunctivae, EOM and lids are normal. No scleral icterus.   Neck: Trachea normal and normal range of motion. Neck supple. No JVD present. No tracheal tenderness present. Carotid bruit is not present. No tracheal deviation present. No thyroid mass and no thyromegaly present.   Cardiovascular: Normal rate, regular rhythm, normal heart sounds and normal pulses. Exam reveals no gallop.   No murmur heard.  Pulmonary/Chest: Effort normal " and breath sounds normal. No respiratory distress. He has no decreased breath sounds. He has no wheezes. He has no rales.   Abdominal: Soft. Normal appearance and bowel sounds are normal. He exhibits no distension and no mass. There is no hepatosplenomegaly. There is no tenderness.   Musculoskeletal: Normal range of motion. He exhibits no edema or tenderness.   No pedal edema and no calf swelling.   Lymphadenopathy:     He has no cervical adenopathy.   Neurological: He is alert and oriented to person, place, and time. He has normal strength and normal reflexes. He displays no tremor and normal reflexes. No cranial nerve deficit or sensory deficit. He exhibits normal muscle tone. Gait normal.   Skin: Skin is warm, dry and intact. No bruising, no ecchymosis, no petechiae and no rash noted. He is not diaphoretic. No cyanosis or erythema. Nails show no clubbing.   Psychiatric: He has a normal mood and affect. His speech is normal and behavior is normal. Judgment and thought content normal. His mood appears not anxious. Cognition and memory are normal. He does not exhibit a depressed mood.   Vitals reviewed.      Lab Review:     Results for MICHELLE GONZALEZ  (MRN [id number]) as of 7/2/2019 10:28   Ref. Range 10/18/2018 11:08 1/23/2019 11:39   Sodium Latest Ref Range: 134 - 144 mmol/L 142    Potassium Latest Ref Range: 3.5 - 5.2 mmol/L 4.4    Chloride Latest Ref Range: 96 - 106 mmol/L 106    CO2 Latest Ref Range: 20 - 29 mmol/L 22    BUN, Bld Latest Ref Range: 8 - 27 mg/dL 15    Creatinine Latest Ref Range: 0.76 - 1.27 mg/dL 1.07    BUN/Creatinine Ratio Latest Ref Range: 10 - 24  14    eGFR if non African American Latest Ref Range: >59 mL/min/1.73 71    eGFR if African American Latest Ref Range: >59 mL/min/1.73 83    Glucose Latest Ref Range: 65 - 99 mg/dL 178 (H)    Calcium Latest Ref Range: 8.6 - 10.2 mg/dL 9.4    Alkaline Phosphatase Latest Ref Range: 39 - 117 IU/L 76    PROTEIN TOTAL Latest Ref Range: 6.0 - 8.5 g/dL 7.1     Albumin Latest Ref Range: 3.6 - 4.8 g/dL 4.6    Albumin/Globulin Ratio Latest Ref Range: 1.2 - 2.2  1.8    BILIRUBIN TOTAL Latest Ref Range: 0.0 - 1.2 mg/dL 1.1    AST Latest Ref Range: 0 - 40 IU/L 31    ALT Latest Ref Range: 0 - 44 IU/L 35    Globulin, Total Latest Ref Range: 1.5 - 4.5 g/dL 2.5    Hemoglobin A1C External Latest Ref Range: 4.8 - 5.6 % 8.7 (H) 8.0 (H)   Creatinine, Random Ur Latest Ref Range: Not Estab. mg/dL 159.8    Microalb, Ur Latest Ref Range: Not Estab. ug/mL 112.2    Microalb/Crt. Ratio Latest Ref Range: 0.0 - 30.0 mg/g creat 70.2 (H)        Assessment:     1. Type 2 diabetes mellitus with hyperglycemia, with long-term current use of insulin  Hemoglobin A1c    GlycoMark (TM)    Fructosamine    Comprehensive metabolic panel    CBC auto differential    Amylase    Lipase    Calcitonin    Lipid panel    Microalbumin/creatinine urine ratio    semaglutide (OZEMPIC) 0.25 mg or 0.5 mg(2 mg/1.5 mL) PnIj   2. Benign hypertension  Comprehensive metabolic panel    Lipid panel    Microalbumin/creatinine urine ratio    Uric acid    Urinalysis   3. Paroxysmal atrial fibrillation     4. Hyperlipidemia, unspecified hyperlipidemia type  Comprehensive metabolic panel    Lipid panel   5. Fatty liver disease, nonalcoholic  Comprehensive metabolic panel    Lipid panel    Gamma GT   6. Gastroesophageal reflux disease without esophagitis  CBC auto differential   7. Vitamin B12 deficiency  CBC auto differential    Vitamin B12 Deficiency Panel    Vitamin B12    Folate    Folate RBC   8. Benign prostatic hyperplasia, unspecified whether lower urinary tract symptoms present  PSA, total and free   9. Hypovitaminosis D  Vitamin D    PTH, intact    Calcium, ionized   10. Type 2 diabetes mellitus with other diabetic arthropathy, with long-term current use of insulin  metFORMIN (GLUCOPHAGE-XR) 500 MG 24 hr tablet   11. Herpes zoster without complication  VARICELLA ZOSTER ANTIBODY, IGG    VARICELLA ZOSTER ANTIBODY, IGM         Regarding type 2 diabetes; Patient would prefer to go an GLP-1 agent and prefers a once weekly alternative. To continue lantus at present dose, maximize metformin to 1 g BID and to commence ozempic 05.mg weekly and stop starlix for now.  To continue SMBG testing QD. For  ophthalmology screening visit.  Regarding hypertension; BP well controlled. To continue present antihypertensive regimen and low dose daily asa. To continue serial ambulatory tracking of BP trends.  Regarding lipid status; to check lipid status today and based on results to decide on need to add on statin therapy.  Regarding atrial fibrillation; ongoing ffup with cardiology team.  Regarding GERD; To continue PPi therapy as before.  Regarding vitamin B12 deficiency; to recheck levels and track trends of same.  Regarding NAFLD; will track transaminases  And consider obtaining upper abdomen USS at next visit. Will also obtain fibrosis risk surrogates at next visit.  Recurrent Zoster eruptions; to check current immunologic status and advised if he has another flare to come to an [facility] facility asa to get lesional sample for virus culture to conform diagnosis so decision can be made regarding potential need for long term prophylactic therapy.    Plan:       FFup in ~ 3mths.

## 2019-07-02 NOTE — LETTER
July 2, 2019      Maico Patterson MD  [address]  [address]  [place] [address]           [place] [facility]  [address]  [place] [address]  Phone: [phone]          Patient: Cyrus Batres Jr.   MR Number: [id number]   YOB: 1950   Date of Visit: 7/2/2019       Dear Dr. Maico Patterson:    Thank you for referring Cyrus Batres to me for evaluation. Attached you will find relevant portions of my assessment and plan of care.    If you have questions, please do not hesitate to call me. I look forward to following Cyrus Batres along with you.    Sincerely,    Jaskaran Lui MD    Enclosure  CC:  No Recipients    If you would like to receive this communication electronically, please contact [email or web address] or [phone] to request more information on [facility] access.    For providers and/or their staff who would like to refer a patient to [facility], please contact us through our one-stop-shop provider referral line, [facility], at [phone].    If you feel you have received this communication in error or would no longer like to receive these types of communications, please e-mail [email or web address]

## 2019-07-03 PROBLEM — R80.9 PROTEINURIA: Status: ACTIVE | Noted: 2019-07-03

## 2019-07-03 LAB
VARICELLA INTERPRETATION: POSITIVE
VARICELLA ZOSTER IGG: 4.56 ISR (ref 0–0.9)

## 2019-07-05 LAB — FOLATE RBC-MCNC: 626 NG/ML (ref 280–791)

## 2019-07-06 LAB
CALCIT SERPL-MCNC: <5 PG/ML
FRUCTOSAMINE SERPL-SCNC: 406 UMOL /L
GLYCOMARK (TM): 2.4 UG/ML
VIT B12 SERPL-MCNC: >1400 NG/L (ref 180–914)

## 2019-07-08 LAB — VZV IGG SER IA-ACNC: <0.91 INDEX

## 2019-07-30 ENCOUNTER — OFFICE VISIT (OUTPATIENT)
Dept: FAMILY MEDICINE | Facility: CLINIC | Age: 69
End: 2019-07-30
Payer: MEDICARE

## 2019-07-30 VITALS
DIASTOLIC BLOOD PRESSURE: 81 MMHG | SYSTOLIC BLOOD PRESSURE: 135 MMHG | HEIGHT: 66 IN | BODY MASS INDEX: 28.93 KG/M2 | HEART RATE: 57 BPM | WEIGHT: 180 LBS

## 2019-07-30 DIAGNOSIS — I10 BENIGN HYPERTENSION: ICD-10-CM

## 2019-07-30 DIAGNOSIS — R22.32 MASS OF LEFT WRIST: ICD-10-CM

## 2019-07-30 DIAGNOSIS — Z78.9 STATIN INTOLERANCE: ICD-10-CM

## 2019-07-30 DIAGNOSIS — E78.5 HYPERLIPIDEMIA, UNSPECIFIED HYPERLIPIDEMIA TYPE: Primary | ICD-10-CM

## 2019-07-30 PROBLEM — Z23 NEED FOR ZOSTER VACCINE: Status: RESOLVED | Noted: 2018-07-19 | Resolved: 2019-07-30

## 2019-07-30 PROBLEM — R21 RASH: Status: RESOLVED | Noted: 2019-01-30 | Resolved: 2019-07-30

## 2019-07-30 PROBLEM — Z23 IMMUNIZATION DUE: Status: RESOLVED | Noted: 2019-01-30 | Resolved: 2019-07-30

## 2019-07-30 PROBLEM — N50.812 PAIN IN LEFT TESTICLE: Status: RESOLVED | Noted: 2018-07-19 | Resolved: 2019-07-30

## 2019-07-30 PROCEDURE — 1101F PR PT FALLS ASSESS DOC 0-1 FALLS W/OUT INJ PAST YR: ICD-10-PCS | Mod: S$GLB,,, | Performed by: INTERNAL MEDICINE

## 2019-07-30 PROCEDURE — 99213 OFFICE O/P EST LOW 20 MIN: CPT | Mod: S$GLB,,, | Performed by: INTERNAL MEDICINE

## 2019-07-30 PROCEDURE — 3079F PR MOST RECENT DIASTOLIC BLOOD PRESSURE 80-89 MM HG: ICD-10-PCS | Mod: S$GLB,,, | Performed by: INTERNAL MEDICINE

## 2019-07-30 PROCEDURE — 3079F DIAST BP 80-89 MM HG: CPT | Mod: S$GLB,,, | Performed by: INTERNAL MEDICINE

## 2019-07-30 PROCEDURE — 3075F SYST BP GE 130 - 139MM HG: CPT | Mod: S$GLB,,, | Performed by: INTERNAL MEDICINE

## 2019-07-30 PROCEDURE — 1101F PT FALLS ASSESS-DOCD LE1/YR: CPT | Mod: S$GLB,,, | Performed by: INTERNAL MEDICINE

## 2019-07-30 PROCEDURE — 99213 PR OFFICE/OUTPT VISIT, EST, LEVL III, 20-29 MIN: ICD-10-PCS | Mod: S$GLB,,, | Performed by: INTERNAL MEDICINE

## 2019-07-30 PROCEDURE — 99999 PR PBB SHADOW E&M-EST. PATIENT-LVL III: ICD-10-PCS | Mod: PBBFAC,,, | Performed by: INTERNAL MEDICINE

## 2019-07-30 PROCEDURE — 99999 PR PBB SHADOW E&M-EST. PATIENT-LVL III: CPT | Mod: PBBFAC,,, | Performed by: INTERNAL MEDICINE

## 2019-07-30 PROCEDURE — 3075F PR MOST RECENT SYSTOLIC BLOOD PRESS GE 130-139MM HG: ICD-10-PCS | Mod: S$GLB,,, | Performed by: INTERNAL MEDICINE

## 2019-07-30 NOTE — PATIENT INSTRUCTIONS
"  Controlling Your Cholesterol  Cholesterol is a waxy substance. It travels in your blood through the blood vessels. When you have high cholesterol, it builds up in the walls of the blood vessels. This makes the vessels narrower. Blood flow decreases. You are then at greater risk for having a heart attack or a stroke.  Good and bad cholesterol  Lipids are fats. Blood is mostly water. Fat and water don't mix. So our bodies need lipoproteins (lipids inside a protein shell) to carry the lipids. The protein shell carries its lipids through the bloodstream. There are two main kinds of lipoproteins:  · LDL (low-density lipoprotein) is known as "bad cholesterol." It mainly carries cholesterol. It delivers this cholesterol to body cells. Excess LDL cholesterol will build up in artery walls. This increases your risk for heart disease and stroke.  · HDL (high-density lipoprotein) is known as "good cholesterol." This protein shell collects excess cholesterol that LDLs have left behind on blood vessel walls. That's why high levels of HDL cholesterol can decrease your risk of heart disease and stroke.  Controlling cholesterol levels  Total cholesterol includes LDL and HDL cholesterol, as well as other fats in the bloodstream. If your total cholesterol is high, follow the steps below to help lower your total cholesterol level:  · Eat less unhealthy fat:  ¨ Cut back on saturated fats and trans (also called hydrogenated) fats by selecting lean cuts of meat, low-fat dairy, and using oils instead of solid fats. Limit baked goods, processed meats, and fried foods. A diet thats high in these fats increases your bad cholesterol. It's not enough to just cut back on foods containing cholesterol.  ¨ Eat about 2 servings of fish per week. Most fish contain omega-3 fatty acids. These help lower blood cholesterol.  ¨ Eat more whole grains and soluble fiber (such as oat bran). These lower overall cholesterol.  · Be active:  ¨ Choose an " activity you enjoy. Walking, swimming, and riding a bike are some good ways to be active.  ¨ Start at a level where you feel comfortable. Increase your time and pace a little each week.  ¨ Work up to 40 minutes of moderate to high intensity physical activity at least 3 to 4 days per week.  ¨ Remember, some activity is better than none.  ¨ If you haven't been exercising regularly, start slowly. Check with your doctor to make sure the exercise plan is right for you.  · Quit smoking. Quitting smoking can improve your lipid levels. It also lowers your risk for heart disease and stroke.  · Weight management. If you are overweight or obese, your health care provider will work with you to lose weight and lower your BMI (body mass index) to a normal or near-normal level. Making diet changes and increasing physical activity can help.  · Take medication as directed. Many people need medication to get their LDL levels to a safe level. Medication to lower cholesterol levels is effective and safe. (But taking medication is not a substitute for exercise or watching your diet!) Your doctor can tell you whether you might benefit from a cholesterol-lowering medication.  Date Last Reviewed: 5/11/2015  © 6912-9011 [facility] [employer]. [address] [place] [address], [place] [address]. All rights reserved. This information is not intended as a substitute for professional medical care. Always follow your healthcare professional's instructions.

## 2019-07-30 NOTE — ASSESSMENT & PLAN NOTE
As of today , patient does not have any swelling in the wrist joint or any pain or discomfort.  In fact does not even recall that he had any pain and trouble in the left wrist.  He had been seen by me in 2016 for wrist pain and swelling and was referred to Dr. Misha Miramontes.  07/30/2019.  This diagnosis will be deleted as of today.

## 2019-07-30 NOTE — PROGRESS NOTES
Subjective:       Patient ID: Cyrus Batres Jr. is a 69 y.o. male.    Chief Complaint: Hypertension and Hyperlipidemia    Mr Batres is a 68-year-old male patient who comes for follow-up. Underlying issues of diabetes mellitus type 2, hypertension or dyslipidemia been noted. Controlling the blood sugars seem to be still a struggle.  He recently saw Dr. Jaskaran louise and there have been changes  in his regimen for diabetes including increasing the dose of metformin and insulin plus adding GLP 1 agonist Ozempic.  Patient has still not filled in this injection as he is concerned about potential for low sugars.  He states that his morning sugars on 85 is a or 90s.  He has lost a couple of lb of weight.  He asks me if he should really take injection Ozempic.    Please note that approximately 4 weeks ago his hemoglobin A1c was 8.0 indicating a little poor control of diabetes.    His rash has gone away from his skin.  He is not currently taking lovastatin and statins will be permanently put on is a list of allergies.              Hypertension   This is a chronic problem. The current episode started more than 1 year ago. The problem has been waxing and waning since onset. Pertinent negatives include no neck pain. Risk factors for coronary artery disease include male gender, diabetes mellitus and dyslipidemia. Past treatments include angiotensin blockers. The current treatment provides moderate improvement. There is no history of hyperaldosteronism or renovascular disease.   Hyperlipidemia   This is a chronic problem. The current episode started more than 1 year ago. He has no history of obesity. Current antihyperlipidemic treatment includes statins. Risk factors for coronary artery disease include a sedentary lifestyle, hypertension, male sex and dyslipidemia.       Past Medical History:   Diagnosis Date    Allergy     pollen extracts    Atrial fibrillation     Chronic anticoagulation     Diabetes mellitus type I      Hypertension      Social History     Socioeconomic History    Marital status:      Spouse name: Janel Batres    Number of children: 2    Years of education: Not on file    Highest education level: Not on file   Occupational History    Occupation: [employer]     Employer: [employer]   Social Needs    Financial resource strain: Not on file    Food insecurity:     Worry: Not on file     Inability: Not on file    Transportation needs:     Medical: Not on file     Non-medical: Not on file   Tobacco Use    Smoking status: Never Smoker    Smokeless tobacco: Never Used   Substance and Sexual Activity    Alcohol use: Yes    Drug use: No    Sexual activity: Yes     Partners: Female   Lifestyle    Physical activity:     Days per week: Not on file     Minutes per session: Not on file    Stress: Only a little   Relationships    Social connections:     Talks on phone: Not on file     Gets together: Not on file     Attends Sikhism service: Not on file     Active member of club or organization: Not on file     Attends meetings of clubs or organizations: Not on file     Relationship status: Not on file   Other Topics Concern    Not on file   Social History Narrative    2 children from his 1st wife     History reviewed. No pertinent surgical history.  Family History   Problem Relation Age of Onset    Abnormal EKG Mother     Diabetes Father     Heart disease Father     Hypertension Father        Review of Systems   Constitutional: Positive for unexpected weight change (gaining wt steadily). Negative for activity change and appetite change.   HENT: Negative for sneezing and trouble swallowing.    Eyes: Negative for visual disturbance.   Respiratory: Negative for chest tightness.    Cardiovascular: Negative for leg swelling.        HTN +ve but Normal Lipids   Gastrointestinal: Negative for abdominal distention, abdominal pain, blood in stool and constipation.        Hep C screen was positive.   Endocrine:  "Negative for cold intolerance and heat intolerance.        DM-elevated triglycerides. LDL cholesterol is92   Genitourinary: Negative for dysuria.   Musculoskeletal: Negative for neck pain.   Skin: Positive for rash. Negative for color change and pallor.        Rash on the medial side of lower extremities.Minor remaining off statins   Allergic/Immunologic: Negative for environmental allergies, food allergies and immunocompromised state.   Neurological: Negative for light-headedness and numbness.   Hematological: Negative for adenopathy. Does not bruise/bleed easily.       HEP C Confirm was negative  Objective:      Blood pressure 135/81, pulse (!) 57, height 5' 6" (1.676 m), weight 81.6 kg (180 lb). Body mass index is 29.05 kg/m².  Physical Exam   Constitutional: He appears well-developed.   HENT:   Head: Normocephalic and atraumatic.   Nose: Nose normal.   Mouth/Throat: Oropharynx is clear and moist. No oropharyngeal exudate.   Eyes: Conjunctivae and EOM are normal.   Neck: Neck supple. No JVD present. No tracheal deviation present. No thyromegaly present.   Cardiovascular: Normal rate, regular rhythm and normal heart sounds. Exam reveals no gallop and no friction rub.   No murmur heard.  Pulmonary/Chest: Effort normal and breath sounds normal. No respiratory distress. He has no wheezes. He has no rales.   Abdominal: Soft. Bowel sounds are normal. He exhibits no distension. There is no tenderness.   Neurological: He is alert. He has normal reflexes.   Skin: Skin is warm and dry. Rash (near clearing) noted.         No rash seen   Psychiatric: He has a normal mood and affect.   Somewhat incongruent   Nursing note and vitals reviewed.        Assessment:       1. Hyperlipidemia, unspecified hyperlipidemia type    2. Benign hypertension    3. Statin intolerance    4. Mass of left wrist           Lab Visit on 07/02/2019   Component Date Value Ref Range Status    Hemoglobin A1C 07/02/2019 8.1* 4.0 - 5.6 % Final    " Estimated Avg Glucose 07/02/2019 186* 68 - 131 mg/dL Final    GlycoMark (TM) 07/02/2019 2.4* ug/mL Final    Fructosamine 07/02/2019 406  SeeBelow umol /L Final    Sodium 07/02/2019 140  136 - 145 mmol/L Final    Potassium 07/02/2019 4.0  3.5 - 5.1 mmol/L Final    Chloride 07/02/2019 106  95 - 110 mmol/L Final    CO2 07/02/2019 24  23 - 29 mmol/L Final    Glucose 07/02/2019 169* 70 - 110 mg/dL Final    BUN, Bld 07/02/2019 16  8 - 23 mg/dL Final    Creatinine 07/02/2019 1.2  0.5 - 1.4 mg/dL Final    Calcium 07/02/2019 9.4  8.7 - 10.5 mg/dL Final    Total Protein 07/02/2019 7.3  6.0 - 8.4 g/dL Final    Albumin 07/02/2019 4.0  3.5 - 5.2 g/dL Final    Total Bilirubin 07/02/2019 1.0  0.1 - 1.0 mg/dL Final    Alkaline Phosphatase 07/02/2019 78  55 - 135 U/L Final    AST 07/02/2019 30  10 - 40 U/L Final    ALT 07/02/2019 38  10 - 44 U/L Final    Anion Gap 07/02/2019 10  8 - 16 mmol/L Final    eGFR if African American 07/02/2019 >60.0  >60 mL/min/1.73 m^2 Final    eGFR if non African American 07/02/2019 >60.0  >60 mL/min/1.73 m^2 Final    WBC 07/02/2019 7.61  3.90 - 12.70 K/uL Final    RBC 07/02/2019 4.82  4.60 - 6.20 M/uL Final    Hemoglobin 07/02/2019 14.3  14.0 - 18.0 g/dL Final    Hematocrit 07/02/2019 44.2  40.0 - 54.0 % Final    Mean Corpuscular Volume 07/02/2019 92  82 - 98 fL Final    Mean Corpuscular Hemoglobin 07/02/2019 29.7  27.0 - 31.0 pg Final    Mean Corpuscular Hemoglobin Conc 07/02/2019 32.4  32.0 - 36.0 g/dL Final    RDW 07/02/2019 12.5  11.5 - 14.5 % Final    Platelets 07/02/2019 186  150 - 350 K/uL Final    MPV 07/02/2019 12.2  9.2 - 12.9 fL Final    Immature Granulocytes 07/02/2019 1.2* 0.0 - 0.5 % Final    Gran # (ANC) 07/02/2019 5.0  1.8 - 7.7 K/uL Final    Immature Grans (Abs) 07/02/2019 0.09* 0.00 - 0.04 K/uL Final    Lymph # 07/02/2019 1.9  1.0 - 4.8 K/uL Final    Mono # 07/02/2019 0.4  0.3 - 1.0 K/uL Final    Eos # 07/02/2019 0.2  0.0 - 0.5 K/uL Final    Baso #  07/02/2019 0.05  0.00 - 0.20 K/uL Final    nRBC 07/02/2019 0  0 /100 WBC Final    Gran% 07/02/2019 65.1  38.0 - 73.0 % Final    Lymph% 07/02/2019 24.7  18.0 - 48.0 % Final    Mono% 07/02/2019 5.5  4.0 - 15.0 % Final    Eosinophil% 07/02/2019 2.8  0.0 - 8.0 % Final    Basophil% 07/02/2019 0.7  0.0 - 1.9 % Final    Differential Method 07/02/2019 Automated   Final    Amylase 07/02/2019 77  20 - 110 U/L Final    Lipase 07/02/2019 52  4 - 60 U/L Final    Calcitonin 07/02/2019 <5.0  <=14.3 pg/mL Final    Cholesterol 07/02/2019 193  120 - 199 mg/dL Final    Triglycerides 07/02/2019 251* 30 - 150 mg/dL Final    HDL 07/02/2019 42  40 - 75 mg/dL Final    LDL Cholesterol 07/02/2019 100.8  63.0 - 159.0 mg/dL Final    Hdl/Cholesterol Ratio 07/02/2019 21.8  20.0 - 50.0 % Final    Total Cholesterol/HDL Ratio 07/02/2019 4.6  2.0 - 5.0 Final    Non-HDL Cholesterol 07/02/2019 151  mg/dL Final    Uric Acid 07/02/2019 7.2* 3.4 - 7.0 mg/dL Final    Vit D, 25-Hydroxy 07/02/2019 13* 30 - 96 ng/mL Final    PTH, Intact 07/02/2019 58.0  9.0 - 77.0 pg/mL Final    Calcium, Ion 07/02/2019 1.24  1.06 - 1.42 mmol/L Final    GGT 07/02/2019 49  8 - 55 U/L Final    PSA Total 07/02/2019 1.0  0.00 - 4.00 ng/mL Final    PSA, Free 07/02/2019 0.33  0.01 - 1.50 ng/mL Final    PSA, Free Pct 07/02/2019 33.00  Not established % Final    Vitamin B-12 07/02/2019 >1400* 180 - 914 ng/L Final    Vitamin B-12 07/02/2019 >2000* 210 - 950 pg/mL Final    Folate 07/02/2019 12.0  4.0 - 24.0 ng/mL Final    RBC Folate 07/02/2019 626  280 - 791 ng/mL Final    Varicella Zoster IgG 07/02/2019 4.56* 0.00 - 0.90 ISR Final    Varicella Interpretation 07/02/2019 Positive* Negative Final    Varicella IgM 07/02/2019 <0.91  <0.91 Index Final   Lab Visit on 07/02/2019   Component Date Value Ref Range Status    Microalbum.,U,Random 07/02/2019 175.0  ug/mL Final    Creatinine, Random Ur 07/02/2019 175.0  23.0 - 375.0 mg/dL Final    Microalb Creat  Ratio 07/02/2019 100.0* 0.0 - 30.0 ug/mg Final    Specimen UA 07/02/2019 Urine, Clean Catch   Final    Color, UA 07/02/2019 Yellow  Yellow, Straw, Lydia Final    Appearance, UA 07/02/2019 Clear  Clear Final    pH, UA 07/02/2019 5.0  5.0 - 8.0 Final    Specific Gravity, UA 07/02/2019 1.020  1.005 - 1.030 Final    Protein, UA 07/02/2019 1+* Negative Final    Glucose, UA 07/02/2019 3+* Negative Final    Ketones, UA 07/02/2019 Negative  Negative Final    Bilirubin (UA) 07/02/2019 Negative  Negative Final    Occult Blood UA 07/02/2019 Negative  Negative Final    Nitrite, UA 07/02/2019 Negative  Negative Final    Leukocytes, UA 07/02/2019 Negative  Negative Final    RBC, UA 07/02/2019 0  0 - 4 /hpf Final    WBC, UA 07/02/2019 0  0 - 5 /hpf Final    Bacteria 07/02/2019 Rare  None-Occ /hpf Final    Yeast, UA 07/02/2019 None  None Final    Hyaline Casts, UA 07/02/2019 0  0-1/lpf /lpf Final    Microscopic Comment 07/02/2019 SEE COMMENT   Final       Plan:           Hyperlipidemia, unspecified hyperlipidemia type    Benign hypertension    Statin intolerance    Mass of left wrist      Patient's blood pressures are in acceptable range at this point.    He is not taking statin medications because of rash and this will be discontinued and statins will be listed as an intolerance henceforth.    He continues to run and exercise and he has lost a couple of op on some weight from the last visit.    He is following with Dr. Shaista Lui for managing and controlling his diabetes.  He has concerns about the new medication injection of Ozempic for tightly controlling his diabetes.  I will defer decision on this med  to his endocrinologist    Otherwise, if he is doing well at this point I will see him in 4-6 months time for follow-up.  I have recommended him appropriate seasonal immunizations also.        .    n  Current Outpatient Medications:     aspirin (ECOTRIN) 81 MG EC tablet, Take 1 tablet by mouth Daily.,  "Disp: , Rfl:     blood sugar diagnostic (BLOOD GLUCOSE TEST) Strp, 1 strip by Misc.(Non-Drug; Combo Route) route 2 (two) times daily before meals., Disp: 200 strip, Rfl: 2    cyanocobalamin (VITAMIN B-12) 500 MCG tablet, Take 500 mcg by mouth once daily., Disp: , Rfl:     ELIQUIS 5 mg Tab, Take 5 mg by mouth 2 (two) times daily., Disp: , Rfl: 3    ergocalciferol (ERGOCALCIFEROL) 50,000 unit Cap, Take 1 capsule (50,000 Units total) by mouth every 7 days., Disp: 12 capsule, Rfl: 3    esomeprazole (NEXIUM) 20 MG capsule, Take 20 mg by mouth before breakfast., Disp: , Rfl:     fluticasone (FLONASE) 50 mcg/actuation nasal spray, USE 2 SPRAYS TO EACH NOSTRIL ONCE A DAY, Disp: 3 Bottle, Rfl: 3    insulin glargine 100 units/mL (3mL) SubQ pen, Inject 27 Units into the skin every evening., Disp: 3 Box, Rfl: 3    losartan (COZAAR) 50 MG tablet, Take 1 tablet (50 mg total) by mouth once daily., Disp: 90 tablet, Rfl: 3    metFORMIN (GLUCOPHAGE-XR) 500 MG 24 hr tablet, Take 2 tablets (1,000 mg total) by mouth 2 (two) times daily with meals., Disp: 180 tablet, Rfl: 3    metoprolol tartrate (LOPRESSOR) 25 MG tablet, Take 1 tablet (25 mg total) by mouth 2 (two) times daily., Disp: 180 tablet, Rfl: 3    pen needle, diabetic (BD ULTRA-FINE YULISSA PEN NEEDLE) 32 gauge x 5/32" Ndle, 1 application by Misc.(Non-Drug; Combo Route) route 2 (two) times daily., Disp: 200 each, Rfl: 1    semaglutide (OZEMPIC) 0.25 mg or 0.5 mg(2 mg/1.5 mL) PnIj, Inject 0.5 mg into the skin once a week., Disp: 12 Syringe, Rfl: 3    SHINGRIX, PF, 50 mcg/0.5 mL injection, TO BE ADMINISTERED BY PHARMACIST FOR IMMUNIZATION, Disp: , Rfl: 1  "

## 2019-08-13 ENCOUNTER — OFFICE VISIT (OUTPATIENT)
Dept: OPHTHALMOLOGY | Facility: CLINIC | Age: 69
End: 2019-08-13
Payer: MEDICARE

## 2019-08-13 DIAGNOSIS — E11.9 DIABETES MELLITUS TYPE 2 WITHOUT RETINOPATHY: Primary | ICD-10-CM

## 2019-08-13 DIAGNOSIS — H40.013 OPEN ANGLE WITH BORDERLINE FINDINGS AND LOW GLAUCOMA RISK IN BOTH EYES: ICD-10-CM

## 2019-08-13 DIAGNOSIS — H53.2 VERTICAL DIPLOPIA: ICD-10-CM

## 2019-08-13 DIAGNOSIS — H52.7 REFRACTIVE ERROR: ICD-10-CM

## 2019-08-13 PROCEDURE — 92133 CPTRZD OPH DX IMG PST SGM ON: CPT | Mod: S$GLB,,, | Performed by: OPHTHALMOLOGY

## 2019-08-13 PROCEDURE — 92004 COMPRE OPH EXAM NEW PT 1/>: CPT | Mod: S$GLB,,, | Performed by: OPHTHALMOLOGY

## 2019-08-13 PROCEDURE — 99999 PR PBB SHADOW E&M-EST. PATIENT-LVL III: ICD-10-PCS | Mod: PBBFAC,,, | Performed by: OPHTHALMOLOGY

## 2019-08-13 PROCEDURE — 92004 PR EYE EXAM, NEW PATIENT,COMPREHESV: ICD-10-PCS | Mod: S$GLB,,, | Performed by: OPHTHALMOLOGY

## 2019-08-13 PROCEDURE — 92133 HEIDELBERG RETINA TOMOGRAPHY (HRT) - OU - BOTH EYES: ICD-10-PCS | Mod: S$GLB,,, | Performed by: OPHTHALMOLOGY

## 2019-08-13 PROCEDURE — 99999 PR PBB SHADOW E&M-EST. PATIENT-LVL III: CPT | Mod: PBBFAC,,, | Performed by: OPHTHALMOLOGY

## 2019-08-13 RX ORDER — NATEGLINIDE 120 MG/1
TABLET ORAL
COMMUNITY
Start: 2019-07-03 | End: 2020-07-09

## 2019-08-13 NOTE — PROGRESS NOTES
HPI     DLE July 2018 Dr Willett    Here for Diabetic eye exam denies eye pain states he has been having   trouble with up close vision over the past 3 years has prism in glasses.   Pt states he had left eye injury scratched cornea 15 years ago. No known   family h/o eye disease.    Lab Results       Component                Value               Date                       HGBA1C                   8.1 (H)             07/02/2019                 HGBA1C                   8.0 (H)             01/23/2019                 HGBA1C                   8.7 (H)             10/18/2018            No results found for: LABA1C    Last edited by Mattie Bradford on 8/13/2019  1:37 PM. (History)        ROS     Positive for: Endocrine (DM diagnosed around age 58), Cardiovascular (HTN   - controlled with meds per pt; A-fib), Eyes (Hx K abrasion around 2000),   Heme/Lymph (ASA and Eliquis)    Negative for: Neurological (denies neuropathy), Genitourinary (denies   nephropathy), Respiratory (denies asthma)    Last edited by Cindy Walters MD on 8/13/2019  2:47 PM.   (History)        Assessment /Plan     For exam results, see Encounter Report.    Diabetes mellitus type 2 without retinopathy    Open angle with borderline findings and low glaucoma risk in both eyes  -     Posterior Segment OCT Optic Nerve- Both eyes; Standing  -     [place] Retina Tomography (HRT) - OU - Both Eyes; Standing  -     Myers Visual Field - OU - Extended - Both Eyes; Standing    Refractive error    Vertical diplopia        F/u DFE annually, continue to work on glucose control.     Borderline C:D ratio, IOP WNL, no known family history. Baseline OCT nerve WNL OD; OS WNL however unable to get reading T/TI - switched to HRT. OD HRT low NI, WNL OS    Some variability in Rx OS - consider topography future visit. MRx given with prism.    Follow up in about 1 year (around 8/13/2020) for Gonio, Pachymetry, 24-2 HVF, IOP check, DFE, HRT.

## 2019-08-16 ENCOUNTER — PATIENT MESSAGE (OUTPATIENT)
Dept: ENDOCRINOLOGY | Facility: CLINIC | Age: 69
End: 2019-08-16

## 2019-08-19 ENCOUNTER — PATIENT OUTREACH (OUTPATIENT)
Dept: ADMINISTRATIVE | Facility: HOSPITAL | Age: 69
End: 2019-08-19

## 2019-08-29 ENCOUNTER — OFFICE VISIT (OUTPATIENT)
Dept: FAMILY MEDICINE | Facility: CLINIC | Age: 69
End: 2019-08-29
Payer: MEDICARE

## 2019-08-29 VITALS
TEMPERATURE: 98 F | WEIGHT: 183.19 LBS | BODY MASS INDEX: 29.44 KG/M2 | SYSTOLIC BLOOD PRESSURE: 122 MMHG | HEIGHT: 66 IN | HEART RATE: 69 BPM | OXYGEN SATURATION: 98 % | DIASTOLIC BLOOD PRESSURE: 80 MMHG

## 2019-08-29 DIAGNOSIS — Z76.89 ENCOUNTER TO ESTABLISH CARE: Primary | ICD-10-CM

## 2019-08-29 PROCEDURE — 99213 PR OFFICE/OUTPT VISIT, EST, LEVL III, 20-29 MIN: ICD-10-PCS | Mod: S$GLB,,, | Performed by: FAMILY MEDICINE

## 2019-08-29 PROCEDURE — 1101F PR PT FALLS ASSESS DOC 0-1 FALLS W/OUT INJ PAST YR: ICD-10-PCS | Mod: CPTII,S$GLB,, | Performed by: FAMILY MEDICINE

## 2019-08-29 PROCEDURE — 99999 PR PBB SHADOW E&M-EST. PATIENT-LVL IV: CPT | Mod: PBBFAC,,, | Performed by: FAMILY MEDICINE

## 2019-08-29 PROCEDURE — 3079F DIAST BP 80-89 MM HG: CPT | Mod: CPTII,S$GLB,, | Performed by: FAMILY MEDICINE

## 2019-08-29 PROCEDURE — 99213 OFFICE O/P EST LOW 20 MIN: CPT | Mod: S$GLB,,, | Performed by: FAMILY MEDICINE

## 2019-08-29 PROCEDURE — 3074F PR MOST RECENT SYSTOLIC BLOOD PRESSURE < 130 MM HG: ICD-10-PCS | Mod: CPTII,S$GLB,, | Performed by: FAMILY MEDICINE

## 2019-08-29 PROCEDURE — 99999 PR PBB SHADOW E&M-EST. PATIENT-LVL IV: ICD-10-PCS | Mod: PBBFAC,,, | Performed by: FAMILY MEDICINE

## 2019-08-29 PROCEDURE — 3079F PR MOST RECENT DIASTOLIC BLOOD PRESSURE 80-89 MM HG: ICD-10-PCS | Mod: CPTII,S$GLB,, | Performed by: FAMILY MEDICINE

## 2019-08-29 PROCEDURE — 3074F SYST BP LT 130 MM HG: CPT | Mod: CPTII,S$GLB,, | Performed by: FAMILY MEDICINE

## 2019-08-29 PROCEDURE — 1101F PT FALLS ASSESS-DOCD LE1/YR: CPT | Mod: CPTII,S$GLB,, | Performed by: FAMILY MEDICINE

## 2019-08-29 NOTE — PROGRESS NOTES
Subjective:       Patient ID: Cyrus Batres Jr. is a 69 y.o. male.    Chief Complaint: Establish Care    HPI    Mr. Batres presents to clinic to establish care. Currently has no compalints.     Declines shingles vaccination (States he had a bad reaction to it.)    Currently a vegan.     Trying to exercise more.     Sees Dr. Walters for his eye exam.     Endocrinologist is Dr. Lui    Past Medical History:   Diagnosis Date    Allergy     pollen extracts    Atrial fibrillation     Chronic anticoagulation     Diabetes mellitus type I     Hypertension        No past surgical history on file.    Family History   Problem Relation Age of Onset    Abnormal EKG Mother     Diabetes Father     Heart disease Father     Hypertension Father        Social History     Tobacco Use    Smoking status: Never Smoker    Smokeless tobacco: Never Used   Substance Use Topics    Alcohol use: Yes    Drug use: No       Social History     Substance and Sexual Activity   Sexual Activity Yes    Partners: Female          Current Outpatient Medications:     aspirin (ECOTRIN) 81 MG EC tablet, Take 1 tablet by mouth Daily., Disp: , Rfl:     blood sugar diagnostic (BLOOD GLUCOSE TEST) Strp, 1 strip by Misc.(Non-Drug; Combo Route) route 2 (two) times daily before meals., Disp: 200 strip, Rfl: 2    cyanocobalamin (VITAMIN B-12) 500 MCG tablet, Take 500 mcg by mouth once daily., Disp: , Rfl:     ELIQUIS 5 mg Tab, Take 5 mg by mouth 2 (two) times daily., Disp: , Rfl: 3    ergocalciferol (ERGOCALCIFEROL) 50,000 unit Cap, Take 1 capsule (50,000 Units total) by mouth every 7 days., Disp: 12 capsule, Rfl: 3    esomeprazole (NEXIUM) 20 MG capsule, Take 20 mg by mouth before breakfast., Disp: , Rfl:     fluticasone (FLONASE) 50 mcg/actuation nasal spray, USE 2 SPRAYS TO EACH NOSTRIL ONCE A DAY, Disp: 3 Bottle, Rfl: 3    insulin glargine 100 units/mL (3mL) SubQ pen, Inject 27 Units into the skin every evening., Disp: 3 Box, Rfl: 3     "losartan (COZAAR) 50 MG tablet, Take 1 tablet (50 mg total) by mouth once daily., Disp: 90 tablet, Rfl: 3    metFORMIN (GLUCOPHAGE-XR) 500 MG 24 hr tablet, Take 2 tablets (1,000 mg total) by mouth 2 (two) times daily with meals., Disp: 180 tablet, Rfl: 3    metoprolol tartrate (LOPRESSOR) 25 MG tablet, Take 1 tablet (25 mg total) by mouth 2 (two) times daily., Disp: 180 tablet, Rfl: 3    nateglinide (STARLIX) 120 MG tablet, , Disp: , Rfl:     pen needle, diabetic (BD ULTRA-FINE YULISSA PEN NEEDLE) 32 gauge x 5/32" Ndle, 1 application by Misc.(Non-Drug; Combo Route) route 2 (two) times daily., Disp: 200 each, Rfl: 1    semaglutide (OZEMPIC) 0.25 mg or 0.5 mg(2 mg/1.5 mL) PnIj, Inject 0.5 mg into the skin once a week., Disp: 12 Syringe, Rfl: 3    SHINGRIX, PF, 50 mcg/0.5 mL injection, TO BE ADMINISTERED BY PHARMACIST FOR IMMUNIZATION, Disp: , Rfl: 1     Review of patient's allergies indicates:   Allergen Reactions    Pollen extracts     Lovastatin Rash     Not confirmed but pt skeptical            Review of Systems   Constitutional: Negative for chills and fever.   HENT: Negative for congestion and sore throat.    Eyes: Negative for visual disturbance.   Respiratory: Negative for cough and shortness of breath.    Cardiovascular: Negative for chest pain.   Gastrointestinal: Negative for abdominal pain, constipation, diarrhea, nausea and vomiting.   Genitourinary: Negative for dysuria.   Musculoskeletal: Negative for joint swelling.   Skin: Negative for rash and wound.   Neurological: Negative for dizziness and headaches.   Hematological: Does not bruise/bleed easily.           Objective:          Vitals:    08/29/19 0958   BP: 122/80   Pulse: 69   Temp: 98.1 °F (36.7 °C)   SpO2: 98%   Weight: 83.1 kg (183 lb 3.2 oz)   Height: 5' 6" (1.676 m)       Physical Exam   Constitutional: He appears well-developed and well-nourished. He is cooperative. No distress.   HENT:   Head: Normocephalic and atraumatic. "   Right Ear: Hearing and external ear normal.   Left Ear: Hearing and external ear normal.   Nose: Nose normal.   Eyes: Conjunctivae, EOM and lids are normal.   Cardiovascular: Normal rate, regular rhythm, normal heart sounds and normal pulses.   Pulmonary/Chest: Effort normal and breath sounds normal.   Abdominal: Soft. Normal appearance and bowel sounds are normal. There is no tenderness.   Musculoskeletal: Normal range of motion.   Neurological: He is alert.   Skin: Skin is warm. Capillary refill takes less than 2 seconds. No rash noted. No cyanosis.   Psychiatric: He has a normal mood and affect. His speech is normal and behavior is normal. Cognition and memory are normal.   Vitals reviewed.              Assessment/Plan     Cyrus was seen today for establish care.    Diagnoses and all orders for this visit:    Encounter to establish care    -     Continue f/u with specialists.       Follow up in about 6 months (around 2/29/2020) for wellness. .    Future Appointments   Date Time Provider Department Center   10/30/2019  3:20 PM Maico Patterson MD [facility] G FA MD [facility] 2   11/4/2019  2:30 PM JONES Mccollum [facility] [place]   3/3/2020  2:20 PM Diana Calhoun MD [facility] [place]         [facility]

## 2019-08-30 DIAGNOSIS — Z12.11 COLON CANCER SCREENING: ICD-10-CM

## 2019-10-03 DIAGNOSIS — Z79.4 TYPE 2 DIABETES MELLITUS WITH OTHER DIABETIC ARTHROPATHY, WITH LONG-TERM CURRENT USE OF INSULIN: ICD-10-CM

## 2019-10-03 DIAGNOSIS — E11.618 TYPE 2 DIABETES MELLITUS WITH OTHER DIABETIC ARTHROPATHY, WITH LONG-TERM CURRENT USE OF INSULIN: ICD-10-CM

## 2019-10-04 RX ORDER — BLOOD SUGAR DIAGNOSTIC
STRIP MISCELLANEOUS
Qty: 200 STRIP | Refills: 2 | OUTPATIENT
Start: 2019-10-04

## 2019-11-04 ENCOUNTER — LAB VISIT (OUTPATIENT)
Dept: LAB | Facility: HOSPITAL | Age: 69
End: 2019-11-04
Attending: PHYSICIAN ASSISTANT
Payer: MEDICARE

## 2019-11-04 ENCOUNTER — OFFICE VISIT (OUTPATIENT)
Dept: ENDOCRINOLOGY | Facility: CLINIC | Age: 69
End: 2019-11-04
Payer: MEDICARE

## 2019-11-04 VITALS
BODY MASS INDEX: 28.93 KG/M2 | TEMPERATURE: 98 F | WEIGHT: 180 LBS | HEIGHT: 66 IN | DIASTOLIC BLOOD PRESSURE: 80 MMHG | HEART RATE: 91 BPM | SYSTOLIC BLOOD PRESSURE: 132 MMHG

## 2019-11-04 DIAGNOSIS — E11.65 TYPE 2 DIABETES MELLITUS WITH HYPERGLYCEMIA, WITH LONG-TERM CURRENT USE OF INSULIN: ICD-10-CM

## 2019-11-04 DIAGNOSIS — B35.1 ONYCHOMYCOSIS: ICD-10-CM

## 2019-11-04 DIAGNOSIS — E55.9 HYPOVITAMINOSIS D: ICD-10-CM

## 2019-11-04 DIAGNOSIS — Z79.4 TYPE 2 DIABETES MELLITUS WITH HYPERGLYCEMIA, WITH LONG-TERM CURRENT USE OF INSULIN: Primary | ICD-10-CM

## 2019-11-04 DIAGNOSIS — I10 BENIGN HYPERTENSION: ICD-10-CM

## 2019-11-04 DIAGNOSIS — Z78.9 STATIN INTOLERANCE: ICD-10-CM

## 2019-11-04 DIAGNOSIS — Z79.4 TYPE 2 DIABETES MELLITUS WITH HYPERGLYCEMIA, WITH LONG-TERM CURRENT USE OF INSULIN: ICD-10-CM

## 2019-11-04 DIAGNOSIS — E78.5 HYPERLIPIDEMIA, UNSPECIFIED HYPERLIPIDEMIA TYPE: ICD-10-CM

## 2019-11-04 DIAGNOSIS — E11.65 TYPE 2 DIABETES MELLITUS WITH HYPERGLYCEMIA, WITH LONG-TERM CURRENT USE OF INSULIN: Primary | ICD-10-CM

## 2019-11-04 LAB
ALBUMIN SERPL BCP-MCNC: 3.9 G/DL (ref 3.5–5.2)
ANION GAP SERPL CALC-SCNC: 7 MMOL/L (ref 8–16)
BUN SERPL-MCNC: 14 MG/DL (ref 8–23)
CALCIUM SERPL-MCNC: 9.6 MG/DL (ref 8.7–10.5)
CHLORIDE SERPL-SCNC: 106 MMOL/L (ref 95–110)
CO2 SERPL-SCNC: 28 MMOL/L (ref 23–29)
CREAT SERPL-MCNC: 1.2 MG/DL (ref 0.5–1.4)
EST. GFR  (AFRICAN AMERICAN): >60 ML/MIN/1.73 M^2
EST. GFR  (NON AFRICAN AMERICAN): >60 ML/MIN/1.73 M^2
GLUCOSE SERPL-MCNC: 139 MG/DL (ref 70–110)
PHOSPHATE SERPL-MCNC: 2.8 MG/DL (ref 2.7–4.5)
POTASSIUM SERPL-SCNC: 4.7 MMOL/L (ref 3.5–5.1)
SODIUM SERPL-SCNC: 141 MMOL/L (ref 136–145)

## 2019-11-04 PROCEDURE — 36415 COLL VENOUS BLD VENIPUNCTURE: CPT | Mod: PO

## 2019-11-04 PROCEDURE — 3075F PR MOST RECENT SYSTOLIC BLOOD PRESS GE 130-139MM HG: ICD-10-PCS | Mod: CPTII,S$GLB,, | Performed by: PHYSICIAN ASSISTANT

## 2019-11-04 PROCEDURE — 99999 PR PBB SHADOW E&M-EST. PATIENT-LVL IV: ICD-10-PCS | Mod: PBBFAC,,, | Performed by: PHYSICIAN ASSISTANT

## 2019-11-04 PROCEDURE — 82306 VITAMIN D 25 HYDROXY: CPT

## 2019-11-04 PROCEDURE — 1101F PT FALLS ASSESS-DOCD LE1/YR: CPT | Mod: CPTII,S$GLB,, | Performed by: PHYSICIAN ASSISTANT

## 2019-11-04 PROCEDURE — 1101F PR PT FALLS ASSESS DOC 0-1 FALLS W/OUT INJ PAST YR: ICD-10-PCS | Mod: CPTII,S$GLB,, | Performed by: PHYSICIAN ASSISTANT

## 2019-11-04 PROCEDURE — 99214 OFFICE O/P EST MOD 30 MIN: CPT | Mod: S$GLB,,, | Performed by: PHYSICIAN ASSISTANT

## 2019-11-04 PROCEDURE — 3079F PR MOST RECENT DIASTOLIC BLOOD PRESSURE 80-89 MM HG: ICD-10-PCS | Mod: CPTII,S$GLB,, | Performed by: PHYSICIAN ASSISTANT

## 2019-11-04 PROCEDURE — 99999 PR PBB SHADOW E&M-EST. PATIENT-LVL IV: CPT | Mod: PBBFAC,,, | Performed by: PHYSICIAN ASSISTANT

## 2019-11-04 PROCEDURE — 3044F HG A1C LEVEL LT 7.0%: CPT | Mod: CPTII,S$GLB,, | Performed by: PHYSICIAN ASSISTANT

## 2019-11-04 PROCEDURE — 80069 RENAL FUNCTION PANEL: CPT

## 2019-11-04 PROCEDURE — 99214 PR OFFICE/OUTPT VISIT, EST, LEVL IV, 30-39 MIN: ICD-10-PCS | Mod: S$GLB,,, | Performed by: PHYSICIAN ASSISTANT

## 2019-11-04 PROCEDURE — 3075F SYST BP GE 130 - 139MM HG: CPT | Mod: CPTII,S$GLB,, | Performed by: PHYSICIAN ASSISTANT

## 2019-11-04 PROCEDURE — 3044F PR MOST RECENT HEMOGLOBIN A1C LEVEL <7.0%: ICD-10-PCS | Mod: CPTII,S$GLB,, | Performed by: PHYSICIAN ASSISTANT

## 2019-11-04 PROCEDURE — 3079F DIAST BP 80-89 MM HG: CPT | Mod: CPTII,S$GLB,, | Performed by: PHYSICIAN ASSISTANT

## 2019-11-04 PROCEDURE — 83036 HEMOGLOBIN GLYCOSYLATED A1C: CPT

## 2019-11-04 RX ORDER — CICLOPIROX OLAMINE 7.7 MG/G
CREAM TOPICAL 2 TIMES DAILY
Qty: 1 TUBE | Refills: 2 | Status: SHIPPED | OUTPATIENT
Start: 2019-11-04 | End: 2020-02-04 | Stop reason: SDUPTHER

## 2019-11-04 RX ORDER — METFORMIN HYDROCHLORIDE 500 MG/1
1000 TABLET, EXTENDED RELEASE ORAL 2 TIMES DAILY WITH MEALS
Qty: 180 TABLET | Refills: 3 | Status: SHIPPED | OUTPATIENT
Start: 2019-11-04 | End: 2020-08-15 | Stop reason: SDUPTHER

## 2019-11-04 NOTE — PROGRESS NOTES
"CC: This 69 y.o. male presents for management of Diabetes    and chronic conditions pending review including HTN, HLP.    HPI: was diagnosed with T2DM in .   Declines .  Has never been hospitalized r/t DM.  Family hx of DM: father  Fhx of thyroid disease:  Denies missing doses of DM medication.   hypoglycemia at home: He did have a glucose ~ 75 when he missed lunch.   monitoring BG at home:  Fastin-91  BT: 103    Diet:   BF-oatmeal  LH-burrito, leftovers  DN-ribs  No snacks.  Avoids sugary beverages.     Exercise: He runs one mile 3x weekly and has been working around the house.    CURRENT DM MEDS: Lantus 27 units QHS, starlix 60mg TID and metformin XR  1000mg BID, Ozempic 0.5 mg weekly    Standards of Care:  Eye exam: Dr. Walters  Podiatry exam: n/a  DE: never.     Taking ergocalciferol weekly.     PMHx, PSHx: reviewed in epic.  Social Hx: no E/T use.    ROS:   Gen: Appetite good, no weight gain or loss, denies fatigue and weakness.  Skin: Skin is intact and heals well, no rashes, no hair changes  Eyes: Denies visual disturbances  Resp: no SOB or GRIFFIN, no cough  Cardiac: No palpitations, chest pain, no edema   GI: No nausea or vomiting, diarrhea, constipation, or abdominal pain.  /GYN: No nocturia, burning or pain.   MS/Neuro: Denies numbness/ tingling in BLE; Gait steady, speech clear  Psych: Denies drug/ETOH abuse, no hx of depression.  Other systems: negative.    /80 (BP Location: Left arm, Patient Position: Sitting, BP Method: Medium (Manual))   Pulse 91   Temp 98 °F (36.7 °C) (Oral)   Ht 5' 6" (1.676 m)   Wt 81.7 kg (180 lb 0.1 oz)   BMI 29.05 kg/m²      PE:  GENERAL: middle aged talkative male, well developed, well nourished.  PSYCH: AAOx3, appropriate mood and affect, pleasant expression, conversant, appears relaxed, well groomed.   EYES: Conjunctiva, corneas clear  NECK: Supple, trachea midline,no thyromegaly or nodules  CHEST: Resp even and unlabored, CTA " bilateral.  CARDIAC: RRR, S1, S2 heard, no murmurs  ABDOMEN: Soft, non-tender, non-distended   VASCULAR: DP pulses +2/4 bilaterally, no edema.  NEURO: Gait steady, CN ll-Xll grossly intact  SKIN: Skin warm and dry no acanthosis nigracans.  Foot Exam: no sores or macerations noted. Onychomycosis on nails bl.    Protective Sensation (w/ 10 gram monofilament):  Right: Intact  Left: Intact    Visual Inspection:  Normal -  Bilateral and Nails Intact - without Evidence of Foot Deformity- Bilateral    Pedal Pulses:   Right: Present  Left: Present    Posterior tibialis:   Right:Present  Left: Present     Vibratory Sensation  Right:Positive  Left:Positive     Personally reviewed labs below:    Lab Visit on 11/04/2019   Component Date Value Ref Range Status    Hemoglobin A1C 11/04/2019 6.1* 4.0 - 5.6 % Final    Comment: ADA Screening Guidelines:  5.7-6.4%  Consistent with prediabetes  >or=6.5%  Consistent with diabetes  High levels of fetal hemoglobin interfere with the HbA1C  assay. Heterozygous hemoglobin variants (HbS, HgC, etc)do  not significantly interfere with this assay.   However, presence of multiple variants may affect accuracy.      Estimated Avg Glucose 11/04/2019 128  68 - 131 mg/dL Final    Glucose 11/04/2019 139* 70 - 110 mg/dL Final    Sodium 11/04/2019 141  136 - 145 mmol/L Final    Potassium 11/04/2019 4.7  3.5 - 5.1 mmol/L Final    Chloride 11/04/2019 106  95 - 110 mmol/L Final    CO2 11/04/2019 28  23 - 29 mmol/L Final    BUN, Bld 11/04/2019 14  8 - 23 mg/dL Final    Calcium 11/04/2019 9.6  8.7 - 10.5 mg/dL Final    Creatinine 11/04/2019 1.2  0.5 - 1.4 mg/dL Final    Albumin 11/04/2019 3.9  3.5 - 5.2 g/dL Final    Phosphorus 11/04/2019 2.8  2.7 - 4.5 mg/dL Final    eGFR if African American 11/04/2019 >60.0  >60 mL/min/1.73 m^2 Final    eGFR if non African American 11/04/2019 >60.0  >60 mL/min/1.73 m^2 Final    Comment: Calculation used to obtain the estimated glomerular filtration  rate  (eGFR) is the CKD-EPI equation.       Anion Gap 11/04/2019 7* 8 - 16 mmol/L Final    Vit D, 25-Hydroxy 11/04/2019 26* 30 - 96 ng/mL Final    Comment: Vitamin D deficiency.........<10 ng/mL                              Vitamin D insufficiency......10-29 ng/mL       Vitamin D sufficiency........> or equal to 30 ng/mL  Vitamin D toxicity............>100 ng/mL           ASSESSMENT and PLAN:    1. Type 2 diabetes mellitus with hyperglycemia, with long-term current use of insulin  Hemoglobin A1c    Renal function panel    semaglutide (OZEMPIC) 1 mg/dose (2 mg/1.5 mL) PnIj    metFORMIN (GLUCOPHAGE-XR) 500 MG 24 hr tablet    Ambulatory Referral to Diabetes Education   2. Benign hypertension     3. Hyperlipidemia, unspecified hyperlipidemia type     4. Hypovitaminosis D  Vitamin D   5. Type 2 diabetes mellitus with other diabetic arthropathy, with long-term current use of insulin  metFORMIN (GLUCOPHAGE-XR) 500 MG 24 hr tablet   6. Onychomycosis  ciclopirox (LOPROX) 0.77 % Crea        T2DM with hyperglycemia-A1c today .Decrease Lantus to 20 units daily. Increase Ozempic to 1 mg daily. Continue nateglinide and metformin. Discussed DM, progression of disease, long term complications, tx options. Referral to DE.  Discussed A1c and BG goals.   Reviewed  hypoglycemia, s/s and appropriate tx.   Instructed to monitor BG and bring meter/ log to every clinic visit.   - takes ASA, ARB, statin    HTN - controlled, continue ASA- monitor.   HLP - LDL , on statin therapy, LFTs WNL  Hypovitaminosis d-stable-check vitamin D  Onychomycosis-start ciclopirox twice daily to affected nails.  Statin intolerance-monitor cholesterol    Follow-up: in 3 months with lab prior

## 2019-11-04 NOTE — PATIENT INSTRUCTIONS
Decrease Lantus to 20 units daily. Increase Ozempic to 1 mg daily. Continue nateglinide and metformin.

## 2019-11-05 LAB
25(OH)D3+25(OH)D2 SERPL-MCNC: 26 NG/ML (ref 30–96)
ESTIMATED AVG GLUCOSE: 128 MG/DL (ref 68–131)
HBA1C MFR BLD HPLC: 6.1 % (ref 4–5.6)

## 2020-01-30 ENCOUNTER — DOCUMENTATION ONLY (OUTPATIENT)
Dept: FAMILY MEDICINE | Facility: CLINIC | Age: 70
End: 2020-01-30

## 2020-01-30 NOTE — PROGRESS NOTES
Pre-Visit Chart Review  For Appointment Scheduled on2/3/2020    There are no preventive care reminders to display for this patient.

## 2020-02-04 ENCOUNTER — OFFICE VISIT (OUTPATIENT)
Dept: ENDOCRINOLOGY | Facility: CLINIC | Age: 70
End: 2020-02-04
Payer: MEDICARE

## 2020-02-04 VITALS
HEART RATE: 80 BPM | TEMPERATURE: 98 F | HEIGHT: 66 IN | SYSTOLIC BLOOD PRESSURE: 126 MMHG | BODY MASS INDEX: 27.76 KG/M2 | DIASTOLIC BLOOD PRESSURE: 78 MMHG | WEIGHT: 172.75 LBS

## 2020-02-04 DIAGNOSIS — E78.5 HYPERLIPIDEMIA, UNSPECIFIED HYPERLIPIDEMIA TYPE: ICD-10-CM

## 2020-02-04 DIAGNOSIS — B35.1 ONYCHOMYCOSIS: ICD-10-CM

## 2020-02-04 DIAGNOSIS — Z79.4 TYPE 2 DIABETES MELLITUS WITH HYPERGLYCEMIA, WITH LONG-TERM CURRENT USE OF INSULIN: Primary | ICD-10-CM

## 2020-02-04 DIAGNOSIS — E11.65 TYPE 2 DIABETES MELLITUS WITH HYPERGLYCEMIA, WITH LONG-TERM CURRENT USE OF INSULIN: Primary | ICD-10-CM

## 2020-02-04 DIAGNOSIS — E55.9 HYPOVITAMINOSIS D: ICD-10-CM

## 2020-02-04 DIAGNOSIS — I10 BENIGN HYPERTENSION: ICD-10-CM

## 2020-02-04 DIAGNOSIS — N52.9 ERECTILE DYSFUNCTION, UNSPECIFIED ERECTILE DYSFUNCTION TYPE: ICD-10-CM

## 2020-02-04 PROCEDURE — 99999 PR PBB SHADOW E&M-EST. PATIENT-LVL III: CPT | Mod: PBBFAC,,, | Performed by: PHYSICIAN ASSISTANT

## 2020-02-04 PROCEDURE — 99213 OFFICE O/P EST LOW 20 MIN: CPT | Mod: S$GLB,,, | Performed by: PHYSICIAN ASSISTANT

## 2020-02-04 PROCEDURE — 1101F PR PT FALLS ASSESS DOC 0-1 FALLS W/OUT INJ PAST YR: ICD-10-PCS | Mod: CPTII,S$GLB,, | Performed by: PHYSICIAN ASSISTANT

## 2020-02-04 PROCEDURE — 3078F PR MOST RECENT DIASTOLIC BLOOD PRESSURE < 80 MM HG: ICD-10-PCS | Mod: CPTII,S$GLB,, | Performed by: PHYSICIAN ASSISTANT

## 2020-02-04 PROCEDURE — 3074F PR MOST RECENT SYSTOLIC BLOOD PRESSURE < 130 MM HG: ICD-10-PCS | Mod: CPTII,S$GLB,, | Performed by: PHYSICIAN ASSISTANT

## 2020-02-04 PROCEDURE — 1126F AMNT PAIN NOTED NONE PRSNT: CPT | Mod: S$GLB,,, | Performed by: PHYSICIAN ASSISTANT

## 2020-02-04 PROCEDURE — 3044F PR MOST RECENT HEMOGLOBIN A1C LEVEL <7.0%: ICD-10-PCS | Mod: CPTII,S$GLB,, | Performed by: PHYSICIAN ASSISTANT

## 2020-02-04 PROCEDURE — 99999 PR PBB SHADOW E&M-EST. PATIENT-LVL III: ICD-10-PCS | Mod: PBBFAC,,, | Performed by: PHYSICIAN ASSISTANT

## 2020-02-04 PROCEDURE — 1159F MED LIST DOCD IN RCRD: CPT | Mod: S$GLB,,, | Performed by: PHYSICIAN ASSISTANT

## 2020-02-04 PROCEDURE — 1101F PT FALLS ASSESS-DOCD LE1/YR: CPT | Mod: CPTII,S$GLB,, | Performed by: PHYSICIAN ASSISTANT

## 2020-02-04 PROCEDURE — 1159F PR MEDICATION LIST DOCUMENTED IN MEDICAL RECORD: ICD-10-PCS | Mod: S$GLB,,, | Performed by: PHYSICIAN ASSISTANT

## 2020-02-04 PROCEDURE — 99213 PR OFFICE/OUTPT VISIT, EST, LEVL III, 20-29 MIN: ICD-10-PCS | Mod: S$GLB,,, | Performed by: PHYSICIAN ASSISTANT

## 2020-02-04 PROCEDURE — 3074F SYST BP LT 130 MM HG: CPT | Mod: CPTII,S$GLB,, | Performed by: PHYSICIAN ASSISTANT

## 2020-02-04 PROCEDURE — 1126F PR PAIN SEVERITY QUANTIFIED, NO PAIN PRESENT: ICD-10-PCS | Mod: S$GLB,,, | Performed by: PHYSICIAN ASSISTANT

## 2020-02-04 PROCEDURE — 3044F HG A1C LEVEL LT 7.0%: CPT | Mod: CPTII,S$GLB,, | Performed by: PHYSICIAN ASSISTANT

## 2020-02-04 PROCEDURE — 3078F DIAST BP <80 MM HG: CPT | Mod: CPTII,S$GLB,, | Performed by: PHYSICIAN ASSISTANT

## 2020-02-04 RX ORDER — CICLOPIROX OLAMINE 7.7 MG/G
CREAM TOPICAL 2 TIMES DAILY
Qty: 1 TUBE | Refills: 2 | Status: SHIPPED | OUTPATIENT
Start: 2020-02-04 | End: 2021-10-08 | Stop reason: SDUPTHER

## 2020-02-04 RX ORDER — ERGOCALCIFEROL 1.25 MG/1
CAPSULE ORAL
Qty: 24 CAPSULE | Refills: 3 | Status: SHIPPED | OUTPATIENT
Start: 2020-02-04 | End: 2020-05-01

## 2020-02-04 RX ORDER — ERGOCALCIFEROL 1.25 MG/1
CAPSULE ORAL
COMMUNITY
Start: 2019-12-03 | End: 2020-02-04 | Stop reason: SDUPTHER

## 2020-02-04 NOTE — PROGRESS NOTES
"CC: This 69 y.o. male presents for management of Diabetes    and chronic conditions pending review including HTN, HLP.    HPI: was diagnosed with T2DM in .   Declines .  Has never been hospitalized r/t DM.  Family hx of DM: father  Fhx of thyroid disease: none  Denies missing doses of DM medication.   hypoglycemia at home: none  monitoring BG at home:  Fastin-103  BT: 103    Diet:   BF-oatmeal  LH-burrito, leftovers  DN-ribs  No snacks.  Avoids sugary beverages.     Exercise: He has been working on his house.     CURRENT DM MEDS: Lantus 18 units QHS (stopped one week ago), starlix 60mg TID and metformin XR  1000mg BID, Ozempic 1 mg weekly    Standards of Care:  Eye exam: Dr. Walters  Podiatry exam: n/a  DE: never.     Taking ergocalciferol weekly.     PMHx, PSHx: reviewed in epic.  Social Hx: no E/T use.    ROS:   Gen: Appetite good, + weight loss, denies fatigue and weakness.  Skin: Skin is intact and heals well, no rashes, no hair changes  Eyes: Denies visual disturbances  Resp: no SOB or GRIFFIN, no cough  Cardiac: No palpitations, chest pain, no edema   GI: No nausea or vomiting, diarrhea, constipation, or abdominal pain.  /GYN: + difficulty with erections, No nocturia, burning or pain.   MS/Neuro: Denies numbness/ tingling in BLE; Gait steady, speech clear  Psych: Denies drug/ETOH abuse, no hx of depression.  Other systems: negative.    /78 (BP Location: Left arm, Patient Position: Sitting, BP Method: Medium (Manual))   Pulse 80   Temp 97.8 °F (36.6 °C) (Oral)   Ht 5' 6" (1.676 m)   Wt 78.4 kg (172 lb 11.7 oz)   BMI 27.88 kg/m²      PE:  GENERAL: middle aged talkative male, well developed, well nourished.  PSYCH: AAOx3, appropriate mood and affect, pleasant expression, conversant, appears relaxed, well groomed.   EYES: Conjunctiva, corneas clear  NECK: Supple, trachea midline,no thyromegaly or nodules  CHEST: Resp even and unlabored, CTA bilateral.  CARDIAC: RRR, S1, S2 heard, " no murmurs  ABDOMEN: Soft, non-tender, non-distended   VASCULAR: DP pulses +2/4 bilaterally, no edema.  NEURO: Gait steady, CN ll-Xll grossly intact  SKIN: Skin warm and dry no acanthosis nigracans.  Exam 11/19  Foot Exam: no sores or macerations noted. Onychomycosis on nails bl.    Protective Sensation (w/ 10 gram monofilament):  Right: Intact  Left: Intact    Visual Inspection:  Normal -  Bilateral and Nails Intact - without Evidence of Foot Deformity- Bilateral    Pedal Pulses:   Right: Present  Left: Present    Posterior tibialis:   Right:Present  Left: Present     Vibratory Sensation  Right:Positive  Left:Positive     Personally reviewed labs below:    Lab Visit on 02/04/2020   Component Date Value Ref Range Status    Microalbum.,U,Random 02/04/2020 44.0  ug/mL Final    Creatinine, Random Ur 02/04/2020 63.0  23.0 - 375.0 mg/dL Final    Comment: The random urine reference ranges provided were established   for 24 hour urine collections.  No reference ranges exist for  random urine specimens.  Correlate clinically.      Microalb Creat Ratio 02/04/2020 69.8* 0.0 - 30.0 ug/mg Final   Lab Visit on 02/04/2020   Component Date Value Ref Range Status    Hemoglobin A1C 02/04/2020 5.8* 4.0 - 5.6 % Final    Comment: ADA Screening Guidelines:  5.7-6.4%  Consistent with prediabetes  >or=6.5%  Consistent with diabetes  High levels of fetal hemoglobin interfere with the HbA1C  assay. Heterozygous hemoglobin variants (HbS, HgC, etc)do  not significantly interfere with this assay.   However, presence of multiple variants may affect accuracy.      Estimated Avg Glucose 02/04/2020 120  68 - 131 mg/dL Final    Cholesterol 02/04/2020 181  120 - 199 mg/dL Final    Comment: The National Cholesterol Education Program (NCEP) has set the  following guidelines (reference ranges) for Cholesterol:  Optimal.....................<200 mg/dL  Borderline High.............200-239 mg/dL  High........................> or = 240 mg/dL       Triglycerides 02/04/2020 212* 30 - 150 mg/dL Final    Comment: The National Cholesterol Education Program (NCEP) has set the  following guidelines (reference values) for triglycerides:  Normal......................<150 mg/dL  Borderline High.............150-199 mg/dL  High........................200-499 mg/dL      HDL 02/04/2020 39* 40 - 75 mg/dL Final    Comment: The National Cholesterol Education Program (NCEP) has set the  following guidelines (reference values) for HDL Cholesterol:  Low...............<40 mg/dL  Optimal...........>60 mg/dL      LDL Cholesterol 02/04/2020 99.6  63.0 - 159.0 mg/dL Final    Comment: The National Cholesterol Education Program (NCEP) has set the  following guidelines (reference values) for LDL Cholesterol:  Optimal.......................<130 mg/dL  Borderline High...............130-159 mg/dL  High..........................160-189 mg/dL  Very High.....................>190 mg/dL      Hdl/Cholesterol Ratio 02/04/2020 21.5  20.0 - 50.0 % Final    Total Cholesterol/HDL Ratio 02/04/2020 4.6  2.0 - 5.0 Final    Non-HDL Cholesterol 02/04/2020 142  mg/dL Final    Comment: Risk category and Non-HDL cholesterol goals:  Coronary heart disease (CHD)or equivalent (10-year risk of CHD >20%):  Non-HDL cholesterol goal     <130 mg/dL  Two or more CHD risk factors and 10-year risk of CHD <= 20%:  Non-HDL cholesterol goal     <160 mg/dL  0 to 1 CHD risk factor:  Non-HDL cholesterol goal     <190 mg/dL      Sodium 02/04/2020 141  136 - 145 mmol/L Final    Potassium 02/04/2020 4.7  3.5 - 5.1 mmol/L Final    Chloride 02/04/2020 106  95 - 110 mmol/L Final    CO2 02/04/2020 25  23 - 29 mmol/L Final    Glucose 02/04/2020 75  70 - 110 mg/dL Final    BUN, Bld 02/04/2020 11  8 - 23 mg/dL Final    Creatinine 02/04/2020 1.1  0.5 - 1.4 mg/dL Final    Calcium 02/04/2020 9.6  8.7 - 10.5 mg/dL Final    Total Protein 02/04/2020 7.2  6.0 - 8.4 g/dL Final    Albumin 02/04/2020 4.0  3.5 - 5.2 g/dL Final     Total Bilirubin 02/04/2020 0.9  0.1 - 1.0 mg/dL Final    Comment: For infants and newborns, interpretation of results should be based  on gestational age, weight and in agreement with clinical  observations.  Premature Infant recommended reference ranges:  Up to 24 hours.............<8.0 mg/dL  Up to 48 hours............<12.0 mg/dL  3-5 days..................<15.0 mg/dL  6-29 days.................<15.0 mg/dL      Alkaline Phosphatase 02/04/2020 65  55 - 135 U/L Final    AST 02/04/2020 26  10 - 40 U/L Final    ALT 02/04/2020 31  10 - 44 U/L Final    Anion Gap 02/04/2020 10  8 - 16 mmol/L Final    eGFR if African American 02/04/2020 >60.0  >60 mL/min/1.73 m^2 Final    eGFR if non African American 02/04/2020 >60.0  >60 mL/min/1.73 m^2 Final    Comment: Calculation used to obtain the estimated glomerular filtration  rate (eGFR) is the CKD-EPI equation.       Testosterone 02/04/2020 559  250 - 1100 ng/dL Final    Comment: Men with clinically significant hypogonadal symptoms and   testosterone values repeatedly in the range of the 200-300 ng/dL   or less, may benefit from testosterone treatment after adequate   risk and benefits counseling.      Testosterone, Free 02/04/2020 42.6* 46.0 - 224.0 pg/mL Final    Testosterone, Bioavailable 02/04/2020 83.8* 110.0 - 575.0 ng/dL Final    Sex Hormone Binding Globulin 02/04/2020 64  22 - 77 nmol/L Final    Albumin 02/04/2020 4.3  3.6 - 5.1 g/dL Final    Comment: For additional information, please refer to   http://education.Locu/faq/WSS413 (This link is   being provided for informational/ educational purposes only.)  This test was developed and its analytical performance   characteristics have been determined by [facility] [facility]. It has not been cleared or approved by the [place]  Food and Drug Administration. This assay has been validated   pursuant to the CLIA regulations and is used for clinical   purposes.  @ Test Performed  By:  [employer] [facility]  Gallito Gomez M.D., Ph.D.,   [address] [place] [address]  [facility]  [id number]           ASSESSMENT and PLAN:    1. Type 2 diabetes mellitus with hyperglycemia, with long-term current use of insulin  Comprehensive metabolic panel   2. Benign hypertension     3. Hyperlipidemia, unspecified hyperlipidemia type     4. Hypovitaminosis D  ergocalciferol (ERGOCALCIFEROL) 50,000 unit Cap   5. Onychomycosis  ciclopirox (LOPROX) 0.77 % Crea   6. Erectile dysfunction, unspecified erectile dysfunction type  Testosterone Panel        T2DM with hyperglycemia-A1c is below goal. Continue nateglinide, Ozempic, and metformin. Discussed DM, progression of disease, long term complications, tx options.     - takes ASA, ARB, statin    HTN - controlled, continue ASA- monitor.   HLP - LDL , on statin therapy, LFTs WNL  Hypovitaminosis d-low-continue ergocalciferol 2x weekly  Onychomycosis- continue ciclopirox twice daily to affected nails.  Statin intolerance-monitor cholesterol  ED-check testosterone    Follow-up: in 3 months with lab prior

## 2020-02-05 DIAGNOSIS — I10 BENIGN HYPERTENSION: ICD-10-CM

## 2020-02-05 RX ORDER — LOSARTAN POTASSIUM 50 MG/1
50 TABLET ORAL DAILY
Qty: 90 TABLET | Refills: 3 | OUTPATIENT
Start: 2020-02-05

## 2020-02-10 DIAGNOSIS — R80.9 MICROALBUMINURIA: Primary | ICD-10-CM

## 2020-02-10 RX ORDER — LOSARTAN POTASSIUM 100 MG/1
100 TABLET ORAL DAILY
Qty: 90 TABLET | Refills: 3 | Status: SHIPPED | OUTPATIENT
Start: 2020-02-10 | End: 2020-04-13 | Stop reason: SDUPTHER

## 2020-02-13 ENCOUNTER — LAB VISIT (OUTPATIENT)
Dept: LAB | Facility: HOSPITAL | Age: 70
End: 2020-02-13
Attending: PHYSICIAN ASSISTANT
Payer: MEDICARE

## 2020-02-13 DIAGNOSIS — N52.9 ERECTILE DYSFUNCTION, UNSPECIFIED ERECTILE DYSFUNCTION TYPE: ICD-10-CM

## 2020-02-13 PROCEDURE — 82040 ASSAY OF SERUM ALBUMIN: CPT

## 2020-02-13 PROCEDURE — 36415 COLL VENOUS BLD VENIPUNCTURE: CPT | Mod: PO

## 2020-02-18 ENCOUNTER — PATIENT OUTREACH (OUTPATIENT)
Dept: ADMINISTRATIVE | Facility: HOSPITAL | Age: 70
End: 2020-02-18

## 2020-02-18 NOTE — PROGRESS NOTES
Chart review completed 02/18/2020.  Care Everywhere updates requested and reviewed.  Immunizations reconciled. Media reviewed.

## 2020-02-19 LAB
ALBUMIN SERPL-MCNC: 4.5 G/DL (ref 3.6–5.1)
SHBG SERPL-SCNC: 55 NMOL/L (ref 22–77)
TESTOST FREE SERPL-MCNC: 52.6 PG/ML (ref 46–224)
TESTOST SERPL-MCNC: 597 NG/DL (ref 250–1100)
TESTOSTERONE.FREE+WB SERPL-MCNC: 108.2 NG/DL (ref 110–575)

## 2020-03-03 ENCOUNTER — OFFICE VISIT (OUTPATIENT)
Dept: FAMILY MEDICINE | Facility: CLINIC | Age: 70
End: 2020-03-03
Payer: MEDICARE

## 2020-03-03 VITALS
BODY MASS INDEX: 27.42 KG/M2 | HEART RATE: 81 BPM | DIASTOLIC BLOOD PRESSURE: 64 MMHG | HEIGHT: 66 IN | WEIGHT: 170.63 LBS | SYSTOLIC BLOOD PRESSURE: 100 MMHG | OXYGEN SATURATION: 98 % | TEMPERATURE: 98 F

## 2020-03-03 DIAGNOSIS — G47.00 INSOMNIA, UNSPECIFIED TYPE: Primary | ICD-10-CM

## 2020-03-03 PROCEDURE — 3078F DIAST BP <80 MM HG: CPT | Mod: CPTII,S$GLB,, | Performed by: FAMILY MEDICINE

## 2020-03-03 PROCEDURE — 99999 PR PBB SHADOW E&M-EST. PATIENT-LVL III: ICD-10-PCS | Mod: PBBFAC,,, | Performed by: FAMILY MEDICINE

## 2020-03-03 PROCEDURE — 1101F PR PT FALLS ASSESS DOC 0-1 FALLS W/OUT INJ PAST YR: ICD-10-PCS | Mod: CPTII,S$GLB,, | Performed by: FAMILY MEDICINE

## 2020-03-03 PROCEDURE — 1159F PR MEDICATION LIST DOCUMENTED IN MEDICAL RECORD: ICD-10-PCS | Mod: S$GLB,,, | Performed by: FAMILY MEDICINE

## 2020-03-03 PROCEDURE — 3078F PR MOST RECENT DIASTOLIC BLOOD PRESSURE < 80 MM HG: ICD-10-PCS | Mod: CPTII,S$GLB,, | Performed by: FAMILY MEDICINE

## 2020-03-03 PROCEDURE — 99214 OFFICE O/P EST MOD 30 MIN: CPT | Mod: S$GLB,,, | Performed by: FAMILY MEDICINE

## 2020-03-03 PROCEDURE — 1101F PT FALLS ASSESS-DOCD LE1/YR: CPT | Mod: CPTII,S$GLB,, | Performed by: FAMILY MEDICINE

## 2020-03-03 PROCEDURE — 99214 PR OFFICE/OUTPT VISIT, EST, LEVL IV, 30-39 MIN: ICD-10-PCS | Mod: S$GLB,,, | Performed by: FAMILY MEDICINE

## 2020-03-03 PROCEDURE — 3074F PR MOST RECENT SYSTOLIC BLOOD PRESSURE < 130 MM HG: ICD-10-PCS | Mod: CPTII,S$GLB,, | Performed by: FAMILY MEDICINE

## 2020-03-03 PROCEDURE — 1159F MED LIST DOCD IN RCRD: CPT | Mod: S$GLB,,, | Performed by: FAMILY MEDICINE

## 2020-03-03 PROCEDURE — 3074F SYST BP LT 130 MM HG: CPT | Mod: CPTII,S$GLB,, | Performed by: FAMILY MEDICINE

## 2020-03-03 PROCEDURE — 99999 PR PBB SHADOW E&M-EST. PATIENT-LVL III: CPT | Mod: PBBFAC,,, | Performed by: FAMILY MEDICINE

## 2020-03-03 RX ORDER — TRAZODONE HYDROCHLORIDE 50 MG/1
50 TABLET ORAL NIGHTLY
Qty: 90 TABLET | Refills: 1 | Status: SHIPPED | OUTPATIENT
Start: 2020-03-03 | End: 2020-07-27 | Stop reason: CLARIF

## 2020-03-03 NOTE — PROGRESS NOTES
Subjective:       Patient ID: Cyrus Batres Jr. is a 69 y.o. male.    Chief Complaint: Follow-up    HPI    Mr. Batres presents clinic for insomnia. Denies being under any new forms of stress. Hasn't been told that he snores. Hasn't tried anything yet. Tries to relax but it doesn't work. Doesn't have frequent urination throughout the night.     Seeing Dr. Xiao for a mass on his vocal cord.    Past Medical History:   Diagnosis Date    Allergy     pollen extracts    Atrial fibrillation     Chronic anticoagulation     Diabetes mellitus type I     Hypertension        No past surgical history on file.    Family History   Problem Relation Age of Onset    Abnormal EKG Mother     Diabetes Father     Heart disease Father     Hypertension Father        Social History     Tobacco Use    Smoking status: Never Smoker    Smokeless tobacco: Never Used   Substance Use Topics    Alcohol use: Yes    Drug use: No       Social History     Substance and Sexual Activity   Sexual Activity Yes    Partners: Female          Current Outpatient Medications:     aspirin (ECOTRIN) 81 MG EC tablet, Take 1 tablet by mouth Daily., Disp: , Rfl:     blood sugar diagnostic (BLOOD GLUCOSE TEST) Strp, 1 strip by Misc.(Non-Drug; Combo Route) route 2 (two) times daily before meals., Disp: 200 strip, Rfl: 2    ciclopirox (LOPROX) 0.77 % Crea, Apply topically 2 (two) times daily., Disp: 1 Tube, Rfl: 2    cyanocobalamin (VITAMIN B-12) 500 MCG tablet, Take 500 mcg by mouth once daily., Disp: , Rfl:     ELIQUIS 5 mg Tab, Take 5 mg by mouth 2 (two) times daily., Disp: , Rfl: 3    ergocalciferol (ERGOCALCIFEROL) 50,000 unit Cap, Take one capsule 2x weekly., Disp: 24 capsule, Rfl: 3    esomeprazole (NEXIUM) 20 MG capsule, Take 20 mg by mouth before breakfast., Disp: , Rfl:     fluticasone (FLONASE) 50 mcg/actuation nasal spray, USE 2 SPRAYS TO EACH NOSTRIL ONCE A DAY, Disp: 3 Bottle, Rfl: 3    losartan (COZAAR) 100 MG tablet, Take 1 tablet  "(100 mg total) by mouth once daily., Disp: 90 tablet, Rfl: 3    metoprolol tartrate (LOPRESSOR) 25 MG tablet, Take 1 tablet (25 mg total) by mouth 2 (two) times daily., Disp: 180 tablet, Rfl: 3    nateglinide (STARLIX) 120 MG tablet, , Disp: , Rfl:     pen needle, diabetic (BD ULTRA-FINE YULISSA PEN NEEDLE) 32 gauge x 5/32" Ndle, 1 application by Misc.(Non-Drug; Combo Route) route 2 (two) times daily., Disp: 200 each, Rfl: 1    semaglutide (OZEMPIC) 1 mg/dose (2 mg/1.5 mL) PnIj, Inject 1 mg into the skin every seven days., Disp: 6 Syringe, Rfl: 3    SHINGRIX, PF, 50 mcg/0.5 mL injection, TO BE ADMINISTERED BY PHARMACIST FOR IMMUNIZATION, Disp: , Rfl: 1    metFORMIN (GLUCOPHAGE-XR) 500 MG 24 hr tablet, Take 2 tablets (1,000 mg total) by mouth 2 (two) times daily with meals., Disp: 180 tablet, Rfl: 3     Review of patient's allergies indicates:   Allergen Reactions    Pollen extracts     Lovastatin Rash     Not confirmed but pt skeptical            Review of Systems   Constitutional: Negative for chills and fever.   HENT: Negative for congestion and sore throat.    Eyes: Negative for visual disturbance.   Respiratory: Negative for cough and shortness of breath.    Cardiovascular: Negative for chest pain.   Gastrointestinal: Negative for abdominal pain, constipation, diarrhea, nausea and vomiting.   Genitourinary: Negative for dysuria.   Musculoskeletal: Negative for joint swelling.   Skin: Negative for rash and wound.   Neurological: Negative for dizziness and headaches.   Hematological: Does not bruise/bleed easily.           Objective:          Vitals:    03/03/20 1412   BP: 100/64   Pulse: 81   Temp: 98.2 °F (36.8 °C)   SpO2: 98%   Weight: 77.4 kg (170 lb 10.2 oz)   Height: 5' 6" (1.676 m)       Physical Exam   Constitutional: He appears well-developed and well-nourished.   HENT:   Head: Normocephalic and atraumatic.   Eyes: Conjunctivae are normal.   Cardiovascular: Normal rate, regular rhythm and " normal heart sounds.   Pulmonary/Chest: Effort normal and breath sounds normal.   Abdominal: Soft. Bowel sounds are normal. There is no tenderness.   Neurological: He is alert.   Skin: Skin is warm.   Psychiatric: He has a normal mood and affect. His behavior is normal.   Vitals reviewed.              Assessment/Plan     Cyrus was seen today for follow-up.    Diagnoses and all orders for this visit:    Insomnia, unspecified type  -     traZODone (DESYREL) 50 MG tablet; Take 1 tablet (50 mg total) by mouth every evening.          Follow up in about 5 months (around 8/3/2020) for check up or sooner if trazodone doesn't work    Future Appointments   Date Time Provider Department Center   3/16/2020  9:30 AM [facility] OIC CT1 LIMIT 450 LBS [facility] IM CT [facility]   5/5/2020  2:30 PM JONES Mccollum ENDOCRN [place]   8/3/2020  3:00 PM MD ZAHIDA Garcia [facility] MED [place]     [facility]

## 2020-03-03 NOTE — PATIENT INSTRUCTIONS
Trazodone tablets  What is this medicine?  TRAZODONE (TRAZ oh done) is used to treat depression.  How should I use this medicine?  Take this medicine by mouth with a glass of water. Follow the directions on the prescription label. Take this medicine shortly after a meal or a light snack. Take your medicine at regular intervals. Do not take your medicine more often than directed. Do not stop taking this medicine suddenly except upon the advice of your doctor. Stopping this medicine too quickly may cause serious side effects or your condition may worsen.  A special MedGuide will be given to you by the pharmacist with each prescription and refill. Be sure to read this information carefully each time.  Talk to your pediatrician regarding the use of this medicine in children. Special care may be needed.  What side effects may I notice from receiving this medicine?  Side effects that you should report to your doctor or health care professional as soon as possible:  · allergic reactions like skin rash, itching or hives, swelling of the face, lips, or tongue  · fast, irregular heartbeat  · feeling faint or lightheaded, falls  · painful erections or other sexual dysfunction  · suicidal thoughts or other mood changes  · trembling  Side effects that usually do not require medical attention (report to your doctor or health care professional if they continue or are bothersome):  · constipation  · headache  · muscle aches or pains  · nausea, vomiting  · unusually weak or tired  What may interact with this medicine?  Do not take this medicine with any of the following medications:  · certain medicines for fungal infections like fluconazole, itraconazole, ketoconazole, posaconazole, voriconazole  · cisapride  · dofetilide  · dronedarone  · linezolid  · MAOIs like Carbex, Eldepryl, Marplan, Nardil, and Parnate  · mesoridazine  · methylene blue (injected into a vein)  · pimozide  · saquinavir  · thioridazine  · ziprasidone  This  medicine may also interact with the following medications:  · alcohol  · antiviral medicines for HIV or AIDS  · aspirin and aspirin-like medicines  · barbiturates like phenobarbital  · certain medicines for blood pressure, heart disease, irregular heart beat  · certain medicines for depression, anxiety, or psychotic disturbances  · certain medicines for migraine headache like almotriptan, eletriptan, frovatriptan, naratriptan, rizatriptan, sumatriptan, zolmitriptan  · certain medicines for seizures like carbamazepine and phenytoin  · certain medicines for sleep  · certain medicines that treat or prevent blood clots like dalteparin, enoxaparin, warfarin  · digoxin  · fentanyl  · lithium  · NSAIDS, medicines for pain and inflammation, like ibuprofen or naproxen  · other medicines that prolong the QT interval (cause an abnormal heart rhythm)  · rasagiline  · supplements like Kamila's wort, kava kava, valerian  · tramadol  · tryptophan  What if I miss a dose?  If you miss a dose, take it as soon as you can. If it is almost time for your next dose, take only that dose. Do not take double or extra doses.  Where should I keep my medicine?  Keep out of the reach of children.  Store at room temperature between 15 and 30 degrees C (59 to 86 degrees F). Protect from light. Keep container tightly closed. Throw away any unused medicine after the expiration date.  What should I tell my health care provider before I take this medicine?  They need to know if you have any of these conditions:  · attempted suicide or thinking about it  · bipolar disorder  · bleeding problems  · glaucoma  · heart disease, or previous heart attack  · irregular heart beat  · kidney or liver disease  · low levels of sodium in the blood  · an unusual or allergic reaction to trazodone, other medicines, foods, dyes or preservatives  · pregnant or trying to get pregnant  · breast-feeding  What should I watch for while using this medicine?  Tell your doctor  if your symptoms do not get better or if they get worse. Visit your doctor or health care professional for regular checks on your progress. Because it may take several weeks to see the full effects of this medicine, it is important to continue your treatment as prescribed by your doctor.  Patients and their families should watch out for new or worsening thoughts of suicide or depression. Also watch out for sudden changes in feelings such as feeling anxious, agitated, panicky, irritable, hostile, aggressive, impulsive, severely restless, overly excited and hyperactive, or not being able to sleep. If this happens, especially at the beginning of treatment or after a change in dose, call your health care professional.  You may get drowsy or dizzy. Do not drive, use machinery, or do anything that needs mental alertness until you know how this medicine affects you. Do not stand or sit up quickly, especially if you are an older patient. This reduces the risk of dizzy or fainting spells. Alcohol may interfere with the effect of this medicine. Avoid alcoholic drinks.  This medicine may cause dry eyes and blurred vision. If you wear contact lenses you may feel some discomfort. Lubricating drops may help. See your eye doctor if the problem does not go away or is severe.  Your mouth may get dry. Chewing sugarless gum, sucking hard candy and drinking plenty of water may help. Contact your doctor if the problem does not go away or is severe.  NOTE:This sheet is a summary. It may not cover all possible information. If you have questions about this medicine, talk to your doctor, pharmacist, or health care provider. Copyright© 2017 [employer]

## 2020-03-10 ENCOUNTER — HOSPITAL ENCOUNTER (OUTPATIENT)
Dept: CARDIOLOGY | Facility: HOSPITAL | Age: 70
Discharge: HOME | End: 2020-03-10
Attending: OTOLARYNGOLOGY
Payer: MEDICARE

## 2020-03-10 DIAGNOSIS — J38.1 VOCAL CORD POLYPS: ICD-10-CM

## 2020-03-10 PROCEDURE — 93010 EKG 12-LEAD: ICD-10-PCS | Mod: ,,, | Performed by: INTERNAL MEDICINE

## 2020-03-10 PROCEDURE — 93005 ELECTROCARDIOGRAM TRACING: CPT

## 2020-03-10 PROCEDURE — 93010 ELECTROCARDIOGRAM REPORT: CPT | Mod: ,,, | Performed by: INTERNAL MEDICINE

## 2020-03-13 DIAGNOSIS — J38.1 POLYP OF LARYNX: Primary | ICD-10-CM

## 2020-03-16 ENCOUNTER — HOSPITAL ENCOUNTER (OUTPATIENT)
Dept: RADIOLOGY | Facility: HOSPITAL | Age: 70
Discharge: HOME | End: 2020-03-16
Attending: OTOLARYNGOLOGY
Payer: MEDICARE

## 2020-03-16 ENCOUNTER — ANESTHESIA EVENT (OUTPATIENT)
Dept: SURGERY | Facility: AMBULARY SURGERY CENTER | Age: 70
End: 2020-03-16
Payer: MEDICARE

## 2020-03-16 DIAGNOSIS — J38.1 POLYP OF LARYNX: ICD-10-CM

## 2020-03-16 LAB
CREAT SERPL-MCNC: 1 MG/DL (ref 0.5–1.4)
SAMPLE: NORMAL

## 2020-03-16 PROCEDURE — 70492 CT SFT TSUE NCK W/O & W/DYE: CPT | Mod: TC,PO

## 2020-03-16 PROCEDURE — 25500020 PHARM REV CODE 255: Mod: PO | Performed by: OTOLARYNGOLOGY

## 2020-03-16 RX ADMIN — IOHEXOL 100 ML: 350 INJECTION, SOLUTION INTRAVENOUS at 09:03

## 2020-03-17 ENCOUNTER — HOSPITAL ENCOUNTER (OUTPATIENT)
Facility: AMBULARY SURGERY CENTER | Age: 70
Discharge: HOME | End: 2020-03-17
Attending: OTOLARYNGOLOGY | Admitting: OTOLARYNGOLOGY
Payer: MEDICARE

## 2020-03-17 ENCOUNTER — ANESTHESIA (OUTPATIENT)
Dept: SURGERY | Facility: AMBULARY SURGERY CENTER | Age: 70
End: 2020-03-17
Payer: MEDICARE

## 2020-03-17 DIAGNOSIS — J38.1 VOCAL CORD POLYPS: ICD-10-CM

## 2020-03-17 LAB — POCT GLUCOSE: 168 MG/DL (ref 70–110)

## 2020-03-17 PROCEDURE — 31541 LARYNSCOP W/TUMR EXC + SCOPE: CPT | Performed by: OTOLARYNGOLOGY

## 2020-03-17 PROCEDURE — 88305 TISSUE EXAM BY PATHOLOGIST: ICD-10-PCS | Mod: 26,,, | Performed by: PATHOLOGY

## 2020-03-17 PROCEDURE — 88305 TISSUE EXAM BY PATHOLOGIST: CPT | Performed by: PATHOLOGY

## 2020-03-17 PROCEDURE — D9220A PRA ANESTHESIA: Mod: ANES,,, | Performed by: ANESTHESIOLOGY

## 2020-03-17 PROCEDURE — D9220A PRA ANESTHESIA: ICD-10-PCS | Mod: CRNA,,, | Performed by: REGISTERED NURSE

## 2020-03-17 PROCEDURE — 88305 TISSUE EXAM BY PATHOLOGIST: CPT | Mod: 26,,, | Performed by: PATHOLOGY

## 2020-03-17 PROCEDURE — D9220A PRA ANESTHESIA: Mod: CRNA,,, | Performed by: REGISTERED NURSE

## 2020-03-17 PROCEDURE — D9220A PRA ANESTHESIA: ICD-10-PCS | Mod: ANES,,, | Performed by: ANESTHESIOLOGY

## 2020-03-17 PROCEDURE — 31540 LARYNGOSCOPY W/EXC OF TUMOR: CPT | Performed by: OTOLARYNGOLOGY

## 2020-03-17 RX ORDER — SUCCINYLCHOLINE CHLORIDE 20 MG/ML
INJECTION INTRAMUSCULAR; INTRAVENOUS
Status: DISCONTINUED | OUTPATIENT
Start: 2020-03-17 | End: 2020-03-17

## 2020-03-17 RX ORDER — EPINEPHRINE 1 MG/ML
INJECTION, SOLUTION INTRACARDIAC; INTRAMUSCULAR; INTRAVENOUS; SUBCUTANEOUS
Status: DISCONTINUED
Start: 2020-03-17 | End: 2020-03-17 | Stop reason: HOSPADM

## 2020-03-17 RX ORDER — ROCURONIUM BROMIDE 10 MG/ML
INJECTION, SOLUTION INTRAVENOUS
Status: DISCONTINUED | OUTPATIENT
Start: 2020-03-17 | End: 2020-03-17

## 2020-03-17 RX ORDER — OXYCODONE HYDROCHLORIDE 5 MG/1
5 TABLET ORAL
Status: CANCELLED | OUTPATIENT
Start: 2020-03-17

## 2020-03-17 RX ORDER — MIDAZOLAM HYDROCHLORIDE 1 MG/ML
INJECTION, SOLUTION INTRAMUSCULAR; INTRAVENOUS
Status: DISCONTINUED | OUTPATIENT
Start: 2020-03-17 | End: 2020-03-17

## 2020-03-17 RX ORDER — SODIUM CHLORIDE, SODIUM LACTATE, POTASSIUM CHLORIDE, CALCIUM CHLORIDE 600; 310; 30; 20 MG/100ML; MG/100ML; MG/100ML; MG/100ML
INJECTION, SOLUTION INTRAVENOUS CONTINUOUS
Status: DISCONTINUED | OUTPATIENT
Start: 2020-03-17 | End: 2022-09-01

## 2020-03-17 RX ORDER — KETAMINE HYDROCHLORIDE 100 MG/ML
INJECTION, SOLUTION INTRAMUSCULAR; INTRAVENOUS
Status: DISCONTINUED | OUTPATIENT
Start: 2020-03-17 | End: 2020-03-17

## 2020-03-17 RX ORDER — LIDOCAINE HYDROCHLORIDE 20 MG/ML
INJECTION INTRAVENOUS
Status: DISCONTINUED | OUTPATIENT
Start: 2020-03-17 | End: 2020-03-17

## 2020-03-17 RX ORDER — PROPOFOL 10 MG/ML
VIAL (ML) INTRAVENOUS
Status: DISCONTINUED | OUTPATIENT
Start: 2020-03-17 | End: 2020-03-17

## 2020-03-17 RX ORDER — SODIUM CHLORIDE 0.9 % (FLUSH) 0.9 %
10 SYRINGE (ML) INJECTION
Status: DISCONTINUED | OUTPATIENT
Start: 2020-03-17 | End: 2020-03-17 | Stop reason: HOSPADM

## 2020-03-17 RX ORDER — METOCLOPRAMIDE HYDROCHLORIDE 5 MG/ML
10 INJECTION INTRAMUSCULAR; INTRAVENOUS EVERY 10 MIN PRN
Status: CANCELLED | OUTPATIENT
Start: 2020-03-17

## 2020-03-17 RX ORDER — FENTANYL CITRATE 50 UG/ML
INJECTION, SOLUTION INTRAMUSCULAR; INTRAVENOUS
Status: DISCONTINUED | OUTPATIENT
Start: 2020-03-17 | End: 2020-03-17

## 2020-03-17 RX ORDER — ONDANSETRON 2 MG/ML
INJECTION INTRAMUSCULAR; INTRAVENOUS
Status: DISCONTINUED | OUTPATIENT
Start: 2020-03-17 | End: 2020-03-17

## 2020-03-17 RX ORDER — PHENYLEPHRINE HYDROCHLORIDE 10 MG/ML
INJECTION INTRAVENOUS
Status: DISCONTINUED | OUTPATIENT
Start: 2020-03-17 | End: 2020-03-17

## 2020-03-17 RX ORDER — GLYCOPYRROLATE 0.2 MG/ML
INJECTION INTRAMUSCULAR; INTRAVENOUS
Status: DISCONTINUED | OUTPATIENT
Start: 2020-03-17 | End: 2020-03-17

## 2020-03-17 RX ORDER — EPINEPHRINE 1 MG/ML
INJECTION INTRAMUSCULAR; INTRAVENOUS; SUBCUTANEOUS
Status: DISCONTINUED | OUTPATIENT
Start: 2020-03-17 | End: 2020-03-17 | Stop reason: HOSPADM

## 2020-03-17 RX ORDER — LIDOCAINE HYDROCHLORIDE 10 MG/ML
1 INJECTION, SOLUTION EPIDURAL; INFILTRATION; INTRACAUDAL; PERINEURAL ONCE
Status: COMPLETED | OUTPATIENT
Start: 2020-03-17 | End: 2020-03-17

## 2020-03-17 RX ORDER — DEXAMETHASONE SODIUM PHOSPHATE 4 MG/ML
INJECTION, SOLUTION INTRA-ARTICULAR; INTRALESIONAL; INTRAMUSCULAR; INTRAVENOUS; SOFT TISSUE
Status: DISCONTINUED | OUTPATIENT
Start: 2020-03-17 | End: 2020-03-17

## 2020-03-17 RX ADMIN — PHENYLEPHRINE HYDROCHLORIDE 50 MCG: 10 INJECTION INTRAVENOUS at 08:03

## 2020-03-17 RX ADMIN — ONDANSETRON 4 MG: 2 INJECTION INTRAMUSCULAR; INTRAVENOUS at 08:03

## 2020-03-17 RX ADMIN — MIDAZOLAM HYDROCHLORIDE 2 MG: 1 INJECTION, SOLUTION INTRAMUSCULAR; INTRAVENOUS at 07:03

## 2020-03-17 RX ADMIN — KETAMINE HYDROCHLORIDE 20 MG: 100 INJECTION, SOLUTION INTRAMUSCULAR; INTRAVENOUS at 08:03

## 2020-03-17 RX ADMIN — ROCURONIUM BROMIDE 5 MG: 10 INJECTION, SOLUTION INTRAVENOUS at 07:03

## 2020-03-17 RX ADMIN — PHENYLEPHRINE HYDROCHLORIDE 100 MCG: 10 INJECTION INTRAVENOUS at 08:03

## 2020-03-17 RX ADMIN — Medication 180 MG: at 07:03

## 2020-03-17 RX ADMIN — PHENYLEPHRINE HYDROCHLORIDE 50 MCG: 10 INJECTION INTRAVENOUS at 07:03

## 2020-03-17 RX ADMIN — SUCCINYLCHOLINE CHLORIDE 160 MG: 20 INJECTION INTRAMUSCULAR; INTRAVENOUS at 07:03

## 2020-03-17 RX ADMIN — Medication 50 MG: at 08:03

## 2020-03-17 RX ADMIN — FENTANYL CITRATE 50 MCG: 50 INJECTION, SOLUTION INTRAMUSCULAR; INTRAVENOUS at 07:03

## 2020-03-17 RX ADMIN — SUCCINYLCHOLINE CHLORIDE 40 MG: 20 INJECTION INTRAMUSCULAR; INTRAVENOUS at 08:03

## 2020-03-17 RX ADMIN — LIDOCAINE HYDROCHLORIDE 100 MG: 20 INJECTION INTRAVENOUS at 07:03

## 2020-03-17 RX ADMIN — DEXAMETHASONE SODIUM PHOSPHATE 8 MG: 4 INJECTION, SOLUTION INTRA-ARTICULAR; INTRALESIONAL; INTRAMUSCULAR; INTRAVENOUS; SOFT TISSUE at 07:03

## 2020-03-17 RX ADMIN — PHENYLEPHRINE HYDROCHLORIDE 100 MCG: 10 INJECTION INTRAVENOUS at 07:03

## 2020-03-17 RX ADMIN — LIDOCAINE HYDROCHLORIDE 0.2 ML: 10 INJECTION, SOLUTION EPIDURAL; INFILTRATION; INTRACAUDAL; PERINEURAL at 07:03

## 2020-03-17 RX ADMIN — KETAMINE HYDROCHLORIDE 20 MG: 100 INJECTION, SOLUTION INTRAMUSCULAR; INTRAVENOUS at 07:03

## 2020-03-17 RX ADMIN — GLYCOPYRROLATE 0.2 MG: 0.2 INJECTION INTRAMUSCULAR; INTRAVENOUS at 07:03

## 2020-03-17 RX ADMIN — FENTANYL CITRATE 50 MCG: 50 INJECTION, SOLUTION INTRAMUSCULAR; INTRAVENOUS at 08:03

## 2020-03-17 NOTE — TRANSFER OF CARE
Anesthesia Transfer of Care Note    Patient: Cyrus Batres Jr.    Procedure(s) Performed: Procedure(s) (LRB):  MICROLARYNGOSCOPY (N/A)    Patient location: PACU    Anesthesia Type: general    Transport from OR: Transported from OR on room air with adequate spontaneous ventilation    Post pain: adequate analgesia    Post assessment: no apparent anesthetic complications and tolerated procedure well    Post vital signs: stable    Level of consciousness: awake    Nausea/Vomiting: no nausea/vomiting    Complications: none    Transfer of care protocol was followed      Last vitals:   Visit Vitals  /74   Pulse 96   Temp 37.1 °C (98.8 °F) (Skin)   Resp 18   Wt 74.3 kg (163 lb 12.8 oz)   SpO2 96%   BMI 26.44 kg/m²

## 2020-03-17 NOTE — ANESTHESIA PREPROCEDURE EVALUATION
03/17/2020  Cyrus Batres Jr. is a 69 y.o., male.    Anesthesia Evaluation    I have reviewed the Patient Summary Reports.    I have reviewed the Nursing Notes.   I have reviewed the Medications.     Review of Systems  Anesthesia Hx:  Denies Family Hx of Anesthesia complications.   Denies Personal Hx of Anesthesia complications.   Social:  Non-Smoker    Cardiovascular:   Hypertension Dysrhythmias atrial fibrillation ECG has been reviewed. Episode AFIB during pneumonia 4 years ago   Pulmonary:   Pneumonia CAP requiring ventilator support 4 years ago   Hepatic/GI:   GERD Liver Disease,    Endocrine:   Diabetes, type 2        Physical Exam  General:  Well nourished    Airway/Jaw/Neck:  Airway Findings: Mouth Opening: Normal Tongue: Normal  General Airway Assessment: Adult  Mallampati: I  TM Distance: Normal, at least 6 cm        Eyes/Ears/Nose:  EYES/EARS/NOSE FINDINGS: Normal   Dental:  DENTAL FINDINGS: Normal   Chest/Lungs:  Chest/Lungs Clear    Heart/Vascular:  Heart Findings: Normal       Mental Status:  Mental Status Findings: Normal        Anesthesia Plan  Type of Anesthesia, risks & benefits discussed:  Anesthesia Type:  general  Patient's Preference:   Intra-op Monitoring Plan: standard ASA monitors  Intra-op Monitoring Plan Comments:   Post Op Pain Control Plan:   Post Op Pain Control Plan Comments:   Induction:   IV  Beta Blocker:  Patient is on a Beta-Blocker and has received one dose within the past 24 hours (No further documentation required).       Informed Consent: Patient understands risks and agrees with Anesthesia plan.  Questions answered. Anesthesia consent signed with patient.  ASA Score: 2     Day of Surgery Review of History & Physical:    H&P update referred to the surgeon.         Ready For Surgery From Anesthesia Perspective.

## 2020-03-17 NOTE — ANESTHESIA POSTPROCEDURE EVALUATION
Anesthesia Post Evaluation    Patient: Cyrus Batres Jr.    Procedure(s) Performed: Procedure(s) (LRB):  MICROLARYNGOSCOPY (N/A)    Final Anesthesia Type: general    Patient location during evaluation: PACU  Patient participation: Yes- Able to Participate  Level of consciousness: awake and alert  Post-procedure vital signs: reviewed and stable  Pain management: adequate  Airway patency: patent    PONV status at discharge: No PONV  Anesthetic complications: no      Cardiovascular status: blood pressure returned to baseline  Respiratory status: unassisted  Hydration status: euvolemic  Follow-up not needed.          Vitals Value Taken Time   /71 3/17/2020  9:35 AM   Temp 37.1 °C (98.8 °F) 3/17/2020  9:30 AM   Pulse 93 3/17/2020  9:36 AM   Resp 18 3/17/2020  9:35 AM   SpO2 97 % 3/17/2020  9:36 AM   Vitals shown include unvalidated device data.      No case tracking events are documented in the log.      Pain/Alexi Score: Alexi Score: 10 (3/17/2020  9:30 AM)

## 2020-03-17 NOTE — BRIEF OP NOTE
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     Call MD for:  temperature >100.4     Call MD for:  persistent nausea and vomiting or diarrhea     Call MD for:  severe uncontrolled pain     Activity as tolerated

## 2020-03-17 NOTE — DISCHARGE INSTRUCTIONS
Laryngoscopy    Voice Rest 24-36 hrs  Liquids and soft foods 24 hrs  Notify MD if fever over 102, bleeding, severe pain

## 2020-03-18 NOTE — OP NOTE
DATE OF PROCEDURE:  03/17/2020    PREOPERATIVE DIAGNOSIS:  Left true vocal cord lesion.    POSTOPERATIVE DIAGNOSIS:  Left true vocal cord lesion.    PROCEDURE:  Direct microlaser laryngoscopy and removal of vocal cord lesion on   the left side.    ANESTHESIA:  General.    BLOOD LOSS:  Minimal.    INDICATIONS:  Cyrus is a 69-year-old man who presented to me with painless   hoarseness.  It had been increasing over a short period of time.  On physical   exam, he had an irregularly edged vocal cord that was slightly red.  He was   treated with antibiotics and later it had an appearance of possible thrush and   was treated with nystatin.  It is still there and he is admitted today for vocal   cord lesion removal using micro laser laryngoscopy.  The vocal cord was mobile.    PROCEDURE IN DETAIL:  After appropriate preoperative evaluation and consent,   Cyrus was brought to the Operating Room and general anesthesia was obtained by   oral endotracheal tube using a laser resistant tube.  The larynx was visualized   using a Dedo laryngoscope.  It was suspended with an appropriate suspension   apparatus.  The microscope with the carbon dioxide laser attached was brought   into the field of view and the vocal cord was visualized.  The lesion appeared   to have skip area.  It was linear in nature along the vocal edge on the kind of   superior surface of the vocal cord and laser was used to dissect around it and   then it was removed in its entirety.  I looked more anterior and at the anterior   commissure.  There was somewhat more husky looking lesion and I biopsied this   and used laser to take it down also.  There also seemed to be extension slightly   subglottic by a millimeter or so and towards the anterior commissure, but not   crossing the anterior commissure.  All this area was taken down with the carbon   dioxide laser.  Bleeding was controlled with pledgets of 1% Xylocaine with   epinephrine.  After several minutes,  these were removed and there was no   significant bleeding identified.  The laryngoscope was then gently removed and   there was no damage to the larynx or the upper teeth or the tongue.  Cyrus was   then awakened, extubated, and brought to Recovery Room in good condition.      GFP/HN  dd: 03/17/2020 09:19:59 (CDT)  td: 03/17/2020 20:02:28 (CDT)  Doc ID   #[id number]  Job ID #[id number]    CC:

## 2020-03-19 LAB
FINAL PATHOLOGIC DIAGNOSIS: NORMAL
GROSS: NORMAL

## 2020-03-23 VITALS
SYSTOLIC BLOOD PRESSURE: 147 MMHG | BODY MASS INDEX: 26.44 KG/M2 | RESPIRATION RATE: 18 BRPM | TEMPERATURE: 98 F | DIASTOLIC BLOOD PRESSURE: 80 MMHG | WEIGHT: 163.81 LBS | OXYGEN SATURATION: 99 % | HEART RATE: 94 BPM

## 2020-04-13 DIAGNOSIS — R80.9 MICROALBUMINURIA: ICD-10-CM

## 2020-04-13 RX ORDER — LOSARTAN POTASSIUM 100 MG/1
100 TABLET ORAL DAILY
Qty: 90 TABLET | Refills: 3 | Status: SHIPPED | OUTPATIENT
Start: 2020-04-13 | End: 2020-07-09 | Stop reason: SDUPTHER

## 2020-04-13 NOTE — TELEPHONE ENCOUNTER
----- Message from Andrew Chacon sent at 4/13/2020 10:56 AM CDT -----  Patient rescheduled appointment from 5-5-20 to 7-9-20, did not want virtual or audio visit, need rx for Losartan called into [facility], call patient at [phone] when called in

## 2020-05-01 DIAGNOSIS — E55.9 HYPOVITAMINOSIS D: ICD-10-CM

## 2020-05-01 RX ORDER — ERGOCALCIFEROL 1.25 MG/1
CAPSULE ORAL
Qty: 12 CAPSULE | Refills: 3 | Status: SHIPPED | OUTPATIENT
Start: 2020-05-01 | End: 2021-01-04

## 2020-05-05 ENCOUNTER — PATIENT MESSAGE (OUTPATIENT)
Dept: ADMINISTRATIVE | Facility: HOSPITAL | Age: 70
End: 2020-05-05

## 2020-05-14 DIAGNOSIS — I10 BENIGN HYPERTENSION: ICD-10-CM

## 2020-05-15 RX ORDER — METOPROLOL TARTRATE 25 MG/1
TABLET, FILM COATED ORAL
Qty: 180 TABLET | Refills: 3 | OUTPATIENT
Start: 2020-05-15

## 2020-07-06 ENCOUNTER — PATIENT OUTREACH (OUTPATIENT)
Dept: ADMINISTRATIVE | Facility: OTHER | Age: 70
End: 2020-07-06

## 2020-07-06 NOTE — PROGRESS NOTES
Chart was reviewed for overdue [facility] Encounters ([facility])  topics  Updates were requested from care everywhere

## 2020-07-07 ENCOUNTER — OFFICE VISIT (OUTPATIENT)
Dept: PRIMARY CARE CLINIC | Facility: CLINIC | Age: 70
End: 2020-07-07
Payer: MEDICARE

## 2020-07-07 VITALS
OXYGEN SATURATION: 100 % | SYSTOLIC BLOOD PRESSURE: 127 MMHG | RESPIRATION RATE: 18 BRPM | TEMPERATURE: 97 F | DIASTOLIC BLOOD PRESSURE: 77 MMHG | HEART RATE: 77 BPM

## 2020-07-07 DIAGNOSIS — Z03.818 ENCOUNTER FOR OBSERVATION FOR SUSPECTED EXPOSURE TO OTHER BIOLOGICAL AGENTS RULED OUT: ICD-10-CM

## 2020-07-07 DIAGNOSIS — Z20.822 SUSPECTED COVID-19 VIRUS INFECTION: Primary | ICD-10-CM

## 2020-07-07 PROCEDURE — 1159F MED LIST DOCD IN RCRD: CPT | Mod: S$GLB,,, | Performed by: NURSE PRACTITIONER

## 2020-07-07 PROCEDURE — 3074F PR MOST RECENT SYSTOLIC BLOOD PRESSURE < 130 MM HG: ICD-10-PCS | Mod: CPTII,S$GLB,, | Performed by: NURSE PRACTITIONER

## 2020-07-07 PROCEDURE — 99203 PR OFFICE/OUTPT VISIT, NEW, LEVL III, 30-44 MIN: ICD-10-PCS | Mod: S$GLB,,, | Performed by: NURSE PRACTITIONER

## 2020-07-07 PROCEDURE — U0003 INFECTIOUS AGENT DETECTION BY NUCLEIC ACID (DNA OR RNA); SEVERE ACUTE RESPIRATORY SYNDROME CORONAVIRUS 2 (SARS-COV-2) (CORONAVIRUS DISEASE [COVID-19]), AMPLIFIED PROBE TECHNIQUE, MAKING USE OF HIGH THROUGHPUT TECHNOLOGIES AS DESCRIBED BY CMS-2020-01-R: HCPCS

## 2020-07-07 PROCEDURE — 99203 OFFICE O/P NEW LOW 30 MIN: CPT | Mod: S$GLB,,, | Performed by: NURSE PRACTITIONER

## 2020-07-07 PROCEDURE — 3078F PR MOST RECENT DIASTOLIC BLOOD PRESSURE < 80 MM HG: ICD-10-PCS | Mod: CPTII,S$GLB,, | Performed by: NURSE PRACTITIONER

## 2020-07-07 PROCEDURE — 3074F SYST BP LT 130 MM HG: CPT | Mod: CPTII,S$GLB,, | Performed by: NURSE PRACTITIONER

## 2020-07-07 PROCEDURE — 1101F PR PT FALLS ASSESS DOC 0-1 FALLS W/OUT INJ PAST YR: ICD-10-PCS | Mod: CPTII,S$GLB,, | Performed by: NURSE PRACTITIONER

## 2020-07-07 PROCEDURE — 1159F PR MEDICATION LIST DOCUMENTED IN MEDICAL RECORD: ICD-10-PCS | Mod: S$GLB,,, | Performed by: NURSE PRACTITIONER

## 2020-07-07 PROCEDURE — 3078F DIAST BP <80 MM HG: CPT | Mod: CPTII,S$GLB,, | Performed by: NURSE PRACTITIONER

## 2020-07-07 PROCEDURE — 1101F PT FALLS ASSESS-DOCD LE1/YR: CPT | Mod: CPTII,S$GLB,, | Performed by: NURSE PRACTITIONER

## 2020-07-07 NOTE — PATIENT INSTRUCTIONS
Instructions for Patients with Confirmed or Suspected COVID-19    If you are awaiting your test result, you will either be called or it will be released to the patient portal.  If you have any questions about your test, please visit [email or web address] or call our COVID-19 information line at [phone].      Preventing the Spread of Coronavirus Disease 2019 (COVID-19) in Homes and Residential Communities -- Patients     Prevention steps for people with confirmed or suspected COVID-19 (including persons under investigation) who do not need to be hospitalized and people with confirmed COVID-19 who were hospitalized and determined to be medically stable to go home.      Stay home except to get medical care.    Separate yourself from other people and animals in your home.    Call ahead before visiting your doctor.    Wear a face mask.    Cover your coughs and sneezes.    Clean your hands often.    Avoid sharing personal household items.    Clean all high-touch surfaces every day.    Monitor your symptoms. Seek prompt medical attention if your illness is worsening (e.g., difficulty breathing). Before seeking care, call your healthcare provider.    If you have a medical emergency and must call 911, notify the dispatcher that you have or are being evaluated for COVID-19. If possible, put on a face mask before emergency medical services arrive.    Use the following symptom-based strategy to return to normal activity following a suspected or confirmed case of COVID-19. Continue isolation until:   o At least 3 days (72 hours) have passed since recovery defined as resolution of fever without the use of fever-reducing medications and improvement in respiratory symptoms (e.g. cough, shortness of breath), and   o At least 10 days have passed since symptoms first appeared.     Precautions for household members, intimate partners and caregivers in a non-healthcare setting of a patient with symptomatic  laboratory-confirmed COVID-19 or a patient under investigation.     Household members, intimate partners and caregivers in a non-healthcare setting may have close contact with a person with symptomatic, laboratory-confirmed COVID-19 or a person under investigation. Close contacts should monitor their health; they should call their healthcare provider right away if they develop symptoms suggestive of COVID-19 (e.g., fever, cough, shortness of breath). Close contacts should also follow these recommendations:     · Stay home for the duration of the time recommended by healthcare provider, except to get medical care. Separate yourself from other people and animals in the home.  · Monitor the patients symptoms. If the patient is getting sicker, call his or her healthcare provider. If the patient has a medical emergency and you need to call 911, notify the dispatch personnel that the patient has or is being evaluated for COVID-19.   · Wear a facemask when around other people such as sharing a room or vehicle and before entering a healthcare provider's office.  · Cover coughs and sneezes with a tissue. Throw used tissues in a lined trash can immediately and wash hands.  · Clean hands often with soap and water for at least 20 seconds or with an alcohol-based hand , rubbing hands together until they feel dry. Avoid touching your eyes, nose, and mouth with unwashed hands.  · Clean all high-touch; surfaces every day, including counters, tabletops, doorknobs, bathroom fixtures, toilets, phones, keyboards, tablets, bedside tables, etc. Use a household cleaning spray or wipe according to label instructions.  · Avoid sharing personal household items such as dishes, drinking glasses, cups, towels, bedding, etc. After these items are used, they should be washed thoroughly with soap and water.  · Use the following symptom-based strategy to return to normal activity following a suspected or confirmed case of COVID-19.  Continue isolation until:   · At least 3 days (72 hours) have passed since recovery defined as resolution of fever without the use of fever-reducing medications and improvement in respiratory symptoms (e.g. cough, shortness of breath), and   · At least 10 days have passed since symptoms first appeared.

## 2020-07-07 NOTE — PROGRESS NOTES
Subjective:        Time seen by provider: 1:23 PM on 07/07/2020    Cyrus Batres Jr. is a 69 y.o. male with DMI and HTN who presents for an evaluation of possible COVID-19. He complains of an intermittent sore throat over the last week. The patient denies exposure to sick contacts, fever, or any other symptoms at this time. No pertinent PSHx.     Review of Systems   Constitutional: Negative for activity change, appetite change, fatigue and fever.   HENT: Positive for sore throat. Negative for congestion and rhinorrhea.    Respiratory: Negative for cough, chest tightness, shortness of breath and wheezing.    Cardiovascular: Negative for chest pain and palpitations.   Gastrointestinal: Negative for diarrhea, nausea and vomiting.   Musculoskeletal: Negative for arthralgias and myalgias.   Skin: Negative for rash.   Neurological: Negative for weakness, light-headedness and headaches.       Objective:      Physical Exam  Vitals signs and nursing note reviewed.   Constitutional:       General: He is not in acute distress.     Appearance: He is well-developed. He is not diaphoretic.   HENT:      Head: Normocephalic and atraumatic.      Nose: Nose normal.   Eyes:      Conjunctiva/sclera: Conjunctivae normal.   Neck:      Musculoskeletal: Normal range of motion.   Cardiovascular:      Rate and Rhythm: Normal rate and regular rhythm.      Heart sounds: Normal heart sounds. No murmur.   Pulmonary:      Effort: Pulmonary effort is normal. No respiratory distress.      Breath sounds: Normal breath sounds. No wheezing.   Musculoskeletal: Normal range of motion.   Skin:     General: Skin is warm and dry.   Neurological:      Mental Status: He is alert and oriented to person, place, and time.         Assessment:       1. Suspected Covid-19 Virus Infection    2. Encounter for observation for suspected exposure to other biological agents ruled out        Plan:       1. Suspected Covid-19 Virus Infection    2. Encounter for  observation for suspected exposure to other biological agents ruled out  - COVID-19 Routine Screening    2. Discharge home and await results.   3. Return to clinic or ED for new or worsening symptoms.   4. Follow-up with PCP as needed.     Scribe Attestation:   I, Stacy Lehman, am scribing for, and in the presence of, SUDARSHAN Shepard. I performed the above scribed service and the documentation accurately describes the services I performed. I attest to the accuracy of the note.  I, SUDARSHAN Ferrari, personally performed the services described in this documentation. All medical record entries made by the scribe were at my direction and in my presence.  I have reviewed the chart and agree that the record reflects my personal performance and is accurate and complete. SUDARSHAN Ferrari.  1:58 PM 07/07/2020

## 2020-07-08 LAB — SARS-COV-2 RNA RESP QL NAA+PROBE: NOT DETECTED

## 2020-07-09 ENCOUNTER — LAB VISIT (OUTPATIENT)
Dept: LAB | Facility: HOSPITAL | Age: 70
End: 2020-07-09
Attending: PHYSICIAN ASSISTANT
Payer: MEDICARE

## 2020-07-09 ENCOUNTER — OFFICE VISIT (OUTPATIENT)
Dept: ENDOCRINOLOGY | Facility: CLINIC | Age: 70
End: 2020-07-09
Payer: MEDICARE

## 2020-07-09 VITALS
DIASTOLIC BLOOD PRESSURE: 82 MMHG | HEART RATE: 77 BPM | OXYGEN SATURATION: 98 % | WEIGHT: 162.13 LBS | HEIGHT: 66 IN | SYSTOLIC BLOOD PRESSURE: 122 MMHG | TEMPERATURE: 99 F | BODY MASS INDEX: 26.06 KG/M2

## 2020-07-09 DIAGNOSIS — Z79.4 TYPE 2 DIABETES MELLITUS WITH HYPERGLYCEMIA, WITH LONG-TERM CURRENT USE OF INSULIN: Primary | ICD-10-CM

## 2020-07-09 DIAGNOSIS — E11.65 TYPE 2 DIABETES MELLITUS WITH HYPERGLYCEMIA, WITH LONG-TERM CURRENT USE OF INSULIN: Primary | ICD-10-CM

## 2020-07-09 DIAGNOSIS — E55.9 HYPOVITAMINOSIS D: ICD-10-CM

## 2020-07-09 DIAGNOSIS — B35.1 ONYCHOMYCOSIS: ICD-10-CM

## 2020-07-09 DIAGNOSIS — I10 BENIGN HYPERTENSION: ICD-10-CM

## 2020-07-09 DIAGNOSIS — Z78.9 STATIN INTOLERANCE: ICD-10-CM

## 2020-07-09 DIAGNOSIS — E78.5 HYPERLIPIDEMIA, UNSPECIFIED HYPERLIPIDEMIA TYPE: ICD-10-CM

## 2020-07-09 DIAGNOSIS — R80.9 MICROALBUMINURIA: ICD-10-CM

## 2020-07-09 DIAGNOSIS — Z79.4 TYPE 2 DIABETES MELLITUS WITH HYPERGLYCEMIA, WITH LONG-TERM CURRENT USE OF INSULIN: ICD-10-CM

## 2020-07-09 DIAGNOSIS — E11.65 TYPE 2 DIABETES MELLITUS WITH HYPERGLYCEMIA, WITH LONG-TERM CURRENT USE OF INSULIN: ICD-10-CM

## 2020-07-09 DIAGNOSIS — N52.9 ERECTILE DYSFUNCTION, UNSPECIFIED ERECTILE DYSFUNCTION TYPE: ICD-10-CM

## 2020-07-09 PROCEDURE — 1101F PR PT FALLS ASSESS DOC 0-1 FALLS W/OUT INJ PAST YR: ICD-10-PCS | Mod: CPTII,S$GLB,, | Performed by: PHYSICIAN ASSISTANT

## 2020-07-09 PROCEDURE — 99213 PR OFFICE/OUTPT VISIT, EST, LEVL III, 20-29 MIN: ICD-10-PCS | Mod: S$GLB,,, | Performed by: PHYSICIAN ASSISTANT

## 2020-07-09 PROCEDURE — 1159F MED LIST DOCD IN RCRD: CPT | Mod: S$GLB,,, | Performed by: PHYSICIAN ASSISTANT

## 2020-07-09 PROCEDURE — 1101F PT FALLS ASSESS-DOCD LE1/YR: CPT | Mod: CPTII,S$GLB,, | Performed by: PHYSICIAN ASSISTANT

## 2020-07-09 PROCEDURE — 3074F PR MOST RECENT SYSTOLIC BLOOD PRESSURE < 130 MM HG: ICD-10-PCS | Mod: CPTII,S$GLB,, | Performed by: PHYSICIAN ASSISTANT

## 2020-07-09 PROCEDURE — 1159F PR MEDICATION LIST DOCUMENTED IN MEDICAL RECORD: ICD-10-PCS | Mod: S$GLB,,, | Performed by: PHYSICIAN ASSISTANT

## 2020-07-09 PROCEDURE — 3079F DIAST BP 80-89 MM HG: CPT | Mod: CPTII,S$GLB,, | Performed by: PHYSICIAN ASSISTANT

## 2020-07-09 PROCEDURE — 83036 HEMOGLOBIN GLYCOSYLATED A1C: CPT

## 2020-07-09 PROCEDURE — 99999 PR PBB SHADOW E&M-EST. PATIENT-LVL V: CPT | Mod: PBBFAC,,, | Performed by: PHYSICIAN ASSISTANT

## 2020-07-09 PROCEDURE — 84443 ASSAY THYROID STIM HORMONE: CPT

## 2020-07-09 PROCEDURE — 3044F PR MOST RECENT HEMOGLOBIN A1C LEVEL <7.0%: ICD-10-PCS | Mod: CPTII,S$GLB,, | Performed by: PHYSICIAN ASSISTANT

## 2020-07-09 PROCEDURE — 80069 RENAL FUNCTION PANEL: CPT

## 2020-07-09 PROCEDURE — 36415 COLL VENOUS BLD VENIPUNCTURE: CPT | Mod: PO

## 2020-07-09 PROCEDURE — 99999 PR PBB SHADOW E&M-EST. PATIENT-LVL V: ICD-10-PCS | Mod: PBBFAC,,, | Performed by: PHYSICIAN ASSISTANT

## 2020-07-09 PROCEDURE — 84439 ASSAY OF FREE THYROXINE: CPT

## 2020-07-09 PROCEDURE — 3044F HG A1C LEVEL LT 7.0%: CPT | Mod: CPTII,S$GLB,, | Performed by: PHYSICIAN ASSISTANT

## 2020-07-09 PROCEDURE — 99213 OFFICE O/P EST LOW 20 MIN: CPT | Mod: S$GLB,,, | Performed by: PHYSICIAN ASSISTANT

## 2020-07-09 PROCEDURE — 3008F BODY MASS INDEX DOCD: CPT | Mod: CPTII,S$GLB,, | Performed by: PHYSICIAN ASSISTANT

## 2020-07-09 PROCEDURE — 1126F AMNT PAIN NOTED NONE PRSNT: CPT | Mod: S$GLB,,, | Performed by: PHYSICIAN ASSISTANT

## 2020-07-09 PROCEDURE — 3008F PR BODY MASS INDEX (BMI) DOCUMENTED: ICD-10-PCS | Mod: CPTII,S$GLB,, | Performed by: PHYSICIAN ASSISTANT

## 2020-07-09 PROCEDURE — 3079F PR MOST RECENT DIASTOLIC BLOOD PRESSURE 80-89 MM HG: ICD-10-PCS | Mod: CPTII,S$GLB,, | Performed by: PHYSICIAN ASSISTANT

## 2020-07-09 PROCEDURE — 1126F PR PAIN SEVERITY QUANTIFIED, NO PAIN PRESENT: ICD-10-PCS | Mod: S$GLB,,, | Performed by: PHYSICIAN ASSISTANT

## 2020-07-09 PROCEDURE — 3074F SYST BP LT 130 MM HG: CPT | Mod: CPTII,S$GLB,, | Performed by: PHYSICIAN ASSISTANT

## 2020-07-09 RX ORDER — LOSARTAN POTASSIUM 100 MG/1
100 TABLET ORAL DAILY
Qty: 90 TABLET | Refills: 3 | Status: SHIPPED | OUTPATIENT
Start: 2020-07-09 | End: 2021-07-06

## 2020-07-09 RX ORDER — NATEGLINIDE 120 MG/1
120 TABLET ORAL
Qty: 270 TABLET | Refills: 3 | Status: SHIPPED | OUTPATIENT
Start: 2020-07-09 | End: 2020-07-17

## 2020-07-09 NOTE — PROGRESS NOTES
"CC: This 69 y.o. male presents for management of Diabetes    and chronic conditions pending review including HTN, HLP.    HPI: was diagnosed with T2DM in . States his his appetite has decreased. He has lost 20 lbs since visit.  Declines .  Has never been hospitalized r/t DM.  Family hx of DM: father  Fhx of thyroid disease: none  Denies missing doses of DM medication.   hypoglycemia at home: none  monitoring BG at home:  Fastin-115    Diet:   BF-cereal  LH-burrito, leftovers, chicken quesadilla   DN-2 slices of pizza  No snacks.  Avoids sugary beverages.     Exercise: He has been walking 8 miles daily.     CURRENT DM MEDS: starlix 120 mg TID and metformin XR  1000mg BID, Ozempic 1 mg weekly    Standards of Care:  Eye exam: Dr. Walters  Podiatry exam: n/a  DE: never.     Taking ergocalciferol weekly.     PMHx, PSHx: reviewed in epic.  Social Hx: no E/T use.    ROS:   Gen: Appetite good, + weight loss (20 lbs), denies fatigue and weakness.  Skin: Skin is intact and heals well , no rashes, no hair changes  Eyes: Denies visual disturbances  Resp: no SOB or GRIFFIN, no cough  Cardiac: No palpitations, chest pain, no edema   GI: No nausea or vomiting, diarrhea, constipation, or abdominal pain.  /GYN: + difficulty with erections, No nocturia, burning or pain.   MS/Neuro: Denies numbness/ tingling in BLE; Gait steady, speech clear  Psych: Denies drug/ETOH abuse, no hx of depression.  Other systems: negative.    /82 (BP Location: Right arm, Patient Position: Sitting, BP Method: Medium (Manual))   Pulse 77   Temp 99 °F (37.2 °C) (Oral)   Ht 5' 6" (1.676 m)   Wt 73.5 kg (162 lb 2.4 oz)   SpO2 98%   BMI 26.17 kg/m²      PE:  GENERAL: middle aged talkative male, well developed, well nourished.  PSYCH: AAOx3, appropriate mood and affect, pleasant expression, conversant, appears relaxed, well groomed.   EYES: Conjunctiva, corneas clear  NECK: Supple, trachea midline,no thyromegaly or " nodules  CHEST: Resp even and unlabored, CTA bilateral.  CARDIAC: RRR, S1, S2 heard, no murmurs  ABDOMEN: Soft, non-tender, non-distended   VASCULAR: DP pulses +2/4 bilaterally, no edema.  NEURO: Gait steady, CN ll-Xll grossly intact  SKIN: Skin warm and dry no acanthosis nigracans.  Exam 11/19  Foot Exam: no sores or macerations noted. Onychomycosis on nails bl.    Protective Sensation (w/ 10 gram monofilament):  Right: Intact  Left: Intact    Visual Inspection:  Normal -  Bilateral and Nails Intact - without Evidence of Foot Deformity- Bilateral    Pedal Pulses:   Right: Present  Left: Present    Posterior tibialis:   Right:Present  Left: Present     Vibratory Sensation  Right:Positive  Left:Positive     Personally reviewed labs below:    Office Visit on 07/07/2020   Component Date Value Ref Range Status    SARS-CoV2 (COVID-19) Qualitative P* 07/07/2020 Not Detected  Not Detected Final    Comment: This test utilizes a real-time reverse transcription  polymerase chain reaction procedure to amplify and   detect the SARS-CoV-2 RdRp and N genes.    The analytical sensitivity (limit of detection) of   this assay is 100 copies/mL.   A Detected result is considered positive for COVID-19.  This patient is considered infected with the   SARS-CoV-2 virus and is presumed to be contagious.    A Not Detected result means that SARS-CoV-2 RNA is not  present above the limit of detection. It does not rule  out the possibility of COVID-19 and should not be the  sole basis for treatment decisions.  If COVID-19 is   strongly suspected based on clinical and exposure   history,re-testing should be considered.    This test is only for use under Food and Drug   Administration s Emergency Use Authorization (EUA).   Commercial reagents are provided by [employer].  Performance characteristics of the EUA have been   independently verified by [facility]   Department of Pathology and L                            aboratory Medicine.           ASSESSMENT and PLAN:    1. Type 2 diabetes mellitus with hyperglycemia, with long-term current use of insulin  Microalbumin/creatinine urine ratio    Renal function panel    Hemoglobin A1C    TSH    T4, free   2. Benign hypertension     3. Hyperlipidemia, unspecified hyperlipidemia type     4. Hypovitaminosis D     5. Onychomycosis     6. Statin intolerance     7. Erectile dysfunction, unspecified erectile dysfunction type     8. Microalbuminuria  losartan (COZAAR) 100 MG tablet      T2DM with hyperglycemia-A1c today. Continue nateglinide, Ozempic, and metformin. Discussed DM, progression of disease, long term complications, tx options.     - takes ASA, ARB, statin    HTN - controlled, continue ASA- monitor.   HLP - LDL , on statin therapy, LFTs WNL  Hypovitaminosis d-low-continue ergocalciferol 2x weekly  Onychomycosis- continue ciclopirox twice daily to affected nails.  Statin intolerance-monitor cholesterol  ED- testosterone is wnl-monitor    Follow-up: in 3 months with lab prior

## 2020-07-10 LAB
ALBUMIN SERPL BCP-MCNC: 4.3 G/DL (ref 3.5–5.2)
ANION GAP SERPL CALC-SCNC: 13 MMOL/L (ref 8–16)
BUN SERPL-MCNC: 19 MG/DL (ref 8–23)
CALCIUM SERPL-MCNC: 10.4 MG/DL (ref 8.7–10.5)
CHLORIDE SERPL-SCNC: 105 MMOL/L (ref 95–110)
CO2 SERPL-SCNC: 22 MMOL/L (ref 23–29)
CREAT SERPL-MCNC: 1.4 MG/DL (ref 0.5–1.4)
EST. GFR  (AFRICAN AMERICAN): 59.3 ML/MIN/1.73 M^2
EST. GFR  (NON AFRICAN AMERICAN): 51.3 ML/MIN/1.73 M^2
ESTIMATED AVG GLUCOSE: 108 MG/DL (ref 68–131)
GLUCOSE SERPL-MCNC: 99 MG/DL (ref 70–110)
HBA1C MFR BLD HPLC: 5.4 % (ref 4–5.6)
PHOSPHATE SERPL-MCNC: 3.2 MG/DL (ref 2.7–4.5)
POTASSIUM SERPL-SCNC: 3.9 MMOL/L (ref 3.5–5.1)
SODIUM SERPL-SCNC: 140 MMOL/L (ref 136–145)
T4 FREE SERPL-MCNC: 0.92 NG/DL (ref 0.71–1.51)
TSH SERPL DL<=0.005 MIU/L-ACNC: 1.05 UIU/ML (ref 0.4–4)

## 2020-07-16 ENCOUNTER — TELEPHONE (OUTPATIENT)
Dept: OTOLARYNGOLOGY | Facility: CLINIC | Age: 70
End: 2020-07-16

## 2020-07-16 DIAGNOSIS — Z03.818 ENCOUNTER FOR OBSERVATION FOR SUSPECTED EXPOSURE TO OTHER BIOLOGICAL AGENTS RULED OUT: ICD-10-CM

## 2020-07-16 DIAGNOSIS — C32.9 MALIGNANT NEOPLASM OF LARYNX: Primary | ICD-10-CM

## 2020-07-16 NOTE — TELEPHONE ENCOUNTER
----- Message from Brianna Lima sent at 7/16/2020  4:37 PM CDT -----  Regarding: speak with nurse  Contact: patient  [phone]-please call patient need to speak with the nurse have question for the nurse concerning appointment on next Thursday would like to know if he will be scoped waiting on a call back I told him nothing is scheduled for that day waiting on a call back.

## 2020-07-20 ENCOUNTER — PATIENT OUTREACH (OUTPATIENT)
Dept: ADMINISTRATIVE | Facility: HOSPITAL | Age: 70
End: 2020-07-20

## 2020-07-20 ENCOUNTER — LAB VISIT (OUTPATIENT)
Dept: PRIMARY CARE CLINIC | Facility: CLINIC | Age: 70
End: 2020-07-20
Payer: MEDICARE

## 2020-07-20 DIAGNOSIS — Z03.818 ENCOUNTER FOR OBSERVATION FOR SUSPECTED EXPOSURE TO OTHER BIOLOGICAL AGENTS RULED OUT: ICD-10-CM

## 2020-07-20 DIAGNOSIS — Z12.12 SCREENING FOR COLORECTAL CANCER: Primary | ICD-10-CM

## 2020-07-20 DIAGNOSIS — Z12.11 SCREENING FOR COLORECTAL CANCER: Primary | ICD-10-CM

## 2020-07-20 PROCEDURE — U0003 INFECTIOUS AGENT DETECTION BY NUCLEIC ACID (DNA OR RNA); SEVERE ACUTE RESPIRATORY SYNDROME CORONAVIRUS 2 (SARS-COV-2) (CORONAVIRUS DISEASE [COVID-19]), AMPLIFIED PROBE TECHNIQUE, MAKING USE OF HIGH THROUGHPUT TECHNOLOGIES AS DESCRIBED BY CMS-2020-01-R: HCPCS

## 2020-07-20 NOTE — PROGRESS NOTES
Chart review completed 2020.  Care Everywhere updates requested and reviewed.  Immunizations reconciled. Media reports reviewed.  Duplicate HM overrides and  orders removed.  Overdue HM topic chart audit and/or requested.  Overdue lab testing linked to upcoming lab appointments if applies.    WOG orders placed. FITKIT  Letter mailed.  PORTAL      Health Maintenance Due   Topic Date Due    TETANUS VACCINE  1969    Colorectal Cancer Screening  2001    Shingles Vaccine (2 of 2) 2018    Eye Exam  2020

## 2020-07-22 ENCOUNTER — HOSPITAL ENCOUNTER (OUTPATIENT)
Dept: RADIOLOGY | Facility: HOSPITAL | Age: 70
Discharge: HOME | End: 2020-07-22
Attending: OTOLARYNGOLOGY
Payer: MEDICARE

## 2020-07-22 ENCOUNTER — PATIENT OUTREACH (OUTPATIENT)
Dept: ADMINISTRATIVE | Facility: OTHER | Age: 70
End: 2020-07-22

## 2020-07-22 DIAGNOSIS — C32.9 MALIGNANT NEOPLASM OF LARYNX: ICD-10-CM

## 2020-07-22 LAB — SARS-COV-2 RNA RESP QL NAA+PROBE: NOT DETECTED

## 2020-07-22 PROCEDURE — 70492 CT SFT TSUE NCK W/O & W/DYE: CPT | Mod: TC,PO

## 2020-07-22 PROCEDURE — 25500020 PHARM REV CODE 255: Mod: PO | Performed by: OTOLARYNGOLOGY

## 2020-07-22 RX ADMIN — IOHEXOL 100 ML: 350 INJECTION, SOLUTION INTRAVENOUS at 02:07

## 2020-07-23 ENCOUNTER — OFFICE VISIT (OUTPATIENT)
Dept: OTOLARYNGOLOGY | Facility: CLINIC | Age: 70
End: 2020-07-23
Payer: MEDICARE

## 2020-07-23 VITALS
SYSTOLIC BLOOD PRESSURE: 128 MMHG | DIASTOLIC BLOOD PRESSURE: 74 MMHG | BODY MASS INDEX: 26.97 KG/M2 | WEIGHT: 167.13 LBS | HEART RATE: 72 BPM | TEMPERATURE: 98 F

## 2020-07-23 DIAGNOSIS — R49.0 DYSPHONIA: Primary | ICD-10-CM

## 2020-07-23 DIAGNOSIS — Q31.9 DYSPLASIA OF LARYNX: ICD-10-CM

## 2020-07-23 DIAGNOSIS — D38.0 NEOPLASM OF UNCERTAIN BEHAVIOR OF LARYNX: ICD-10-CM

## 2020-07-23 PROCEDURE — 3074F PR MOST RECENT SYSTOLIC BLOOD PRESSURE < 130 MM HG: ICD-10-PCS | Mod: CPTII,S$GLB,, | Performed by: OTOLARYNGOLOGY

## 2020-07-23 PROCEDURE — 3078F DIAST BP <80 MM HG: CPT | Mod: CPTII,S$GLB,, | Performed by: OTOLARYNGOLOGY

## 2020-07-23 PROCEDURE — 99999 PR PBB SHADOW E&M-EST. PATIENT-LVL IV: ICD-10-PCS | Mod: PBBFAC,,, | Performed by: OTOLARYNGOLOGY

## 2020-07-23 PROCEDURE — 1126F AMNT PAIN NOTED NONE PRSNT: CPT | Mod: S$GLB,,, | Performed by: OTOLARYNGOLOGY

## 2020-07-23 PROCEDURE — 3008F BODY MASS INDEX DOCD: CPT | Mod: CPTII,S$GLB,, | Performed by: OTOLARYNGOLOGY

## 2020-07-23 PROCEDURE — 99204 PR OFFICE/OUTPT VISIT, NEW, LEVL IV, 45-59 MIN: ICD-10-PCS | Mod: 25,S$GLB,, | Performed by: OTOLARYNGOLOGY

## 2020-07-23 PROCEDURE — 31579 LARYNGOSCOPY TELESCOPIC: CPT | Mod: S$GLB,,, | Performed by: OTOLARYNGOLOGY

## 2020-07-23 PROCEDURE — 1101F PR PT FALLS ASSESS DOC 0-1 FALLS W/OUT INJ PAST YR: ICD-10-PCS | Mod: CPTII,S$GLB,, | Performed by: OTOLARYNGOLOGY

## 2020-07-23 PROCEDURE — 3078F PR MOST RECENT DIASTOLIC BLOOD PRESSURE < 80 MM HG: ICD-10-PCS | Mod: CPTII,S$GLB,, | Performed by: OTOLARYNGOLOGY

## 2020-07-23 PROCEDURE — 1159F PR MEDICATION LIST DOCUMENTED IN MEDICAL RECORD: ICD-10-PCS | Mod: S$GLB,,, | Performed by: OTOLARYNGOLOGY

## 2020-07-23 PROCEDURE — 99999 PR PBB SHADOW E&M-EST. PATIENT-LVL IV: CPT | Mod: PBBFAC,,, | Performed by: OTOLARYNGOLOGY

## 2020-07-23 PROCEDURE — 3008F PR BODY MASS INDEX (BMI) DOCUMENTED: ICD-10-PCS | Mod: CPTII,S$GLB,, | Performed by: OTOLARYNGOLOGY

## 2020-07-23 PROCEDURE — 3074F SYST BP LT 130 MM HG: CPT | Mod: CPTII,S$GLB,, | Performed by: OTOLARYNGOLOGY

## 2020-07-23 PROCEDURE — 31579 PR LARYNGOSCOPY, FLEX/RIGID TELESCOPIC, W/STROBOSCOPY: ICD-10-PCS | Mod: S$GLB,,, | Performed by: OTOLARYNGOLOGY

## 2020-07-23 PROCEDURE — 1101F PT FALLS ASSESS-DOCD LE1/YR: CPT | Mod: CPTII,S$GLB,, | Performed by: OTOLARYNGOLOGY

## 2020-07-23 PROCEDURE — 1126F PR PAIN SEVERITY QUANTIFIED, NO PAIN PRESENT: ICD-10-PCS | Mod: S$GLB,,, | Performed by: OTOLARYNGOLOGY

## 2020-07-23 PROCEDURE — 1159F MED LIST DOCD IN RCRD: CPT | Mod: S$GLB,,, | Performed by: OTOLARYNGOLOGY

## 2020-07-23 PROCEDURE — 99204 OFFICE O/P NEW MOD 45 MIN: CPT | Mod: 25,S$GLB,, | Performed by: OTOLARYNGOLOGY

## 2020-07-23 NOTE — PROGRESS NOTES
[facility]  Department of Otorhinolaryngology and Communication Sciences    Cyrus Batres Jr. is a 70 y.o. male who [facility] the [facility] for consultation at the kind request of Dr. Jung Xiao for further management of a vocal fold lesion.     He complains of hoarseness, gradual in onset, earlier this year.  Dr. Xiao noted a vocal cord lesion on the left and brought him for micro laryngeal surgery using laser in March 2020.  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  Since that time, the patient's voice has remained hoarse and has deteriorated.  He was recently re-evaluated by Dr. Xiao, who noted recurrence of the lesion.  He obtained a CT of the neck and sent him to meet with me for further evaluation and management.    The patient is a lifetime nonsmoker.  He only rarely consumes alcohol.  He reports that his father had some type of laryngeal malignancy/dysplasia issue which required 4 surgeries.    He has AFib - on ASA/Eliquis.     He is retired from working for [employer].  He moved to [place] from [place] in 2009.  He lives in [place] with his wife.    Relevant diagnostics are as follows:    Path 3/17/2020: 1. BIOPSY OF LEFT TRUE VOCAL CORD:  SEVERELY DYSPLASTIC APPEARING SQUAMOUS MUCOSA  INVASIVE CARCINOMA IS NOT DOCUMENTED  ON THE OTHER HAND, THESE FRAGMENTS ARE NODUL AND WITHOUT SUBMUCOSA FOR  EVALUATION; IT IS POSSIBLE THAT THEY REFLECT INVASIVE SQUAMOUS CARCINOMA  2. BIOPSY OF LEFT ANTERIOR COMMISSURE:  MODERATE DYSPLASIA  NO INVASIVE CARCINOMA IDENTIFIED    CT neck 7/22/2020: bulky anterior commissure mass extending out to thyroid notch and inferior aspect of thyroid notch but not ; no lymphadenopathy    Past Medical History  He has a past medical history of Allergy, Atrial fibrillation, Chronic anticoagulation, Diabetes mellitus type I, and Hypertension.    Past Surgical History  He has a past surgical history that includes Microlaryngoscopy (N/A,  3/17/2020).    Family History  His family history includes Abnormal EKG in his mother; Diabetes in his father; Heart disease in his father; Hypertension in his father.    Social History  He reports that he has never smoked. He has never used smokeless tobacco. He reports current alcohol use. He reports that he does not use drugs.    Allergies  He is allergic to pollen extracts and lovastatin.    Medications  He has a current medication list which includes the following prescription(s): aspirin, blood sugar diagnostic, ciclopirox, cyanocobalamin, eliquis, ergocalciferol, esomeprazole, fluticasone propionate, losartan, metoprolol tartrate, nateglinide, semaglutide, shingrix (pf), trazodone, metformin, and pen needle, diabetic, and the following Facility-Administered Medications: lactated ringers.    Review of Systems   Constitutional: Negative for fever.   HENT: Negative for sore throat.    Eyes: Negative for visual disturbance.   Respiratory: Negative for wheezing.    Cardiovascular: Negative for chest pain.   Gastrointestinal: Negative for nausea.   Musculoskeletal: Negative for arthralgias.   Skin: Negative for rash.   Neurological: Negative for tremors.   Hematological: Does not bruise/bleed easily.   Psychiatric/Behavioral: The patient is not nervous/anxious.           Objective:     /74   Pulse 72   Temp 98 °F (36.7 °C)   Wt 75.8 kg (167 lb 1.7 oz)   BMI 26.97 kg/m²      Physical Exam    Constitutional: comfortable, well dressed  Psychiatric: appropriate affect  Respiratory: comfortably breathing, symmetric chest rise, no stridor  Voice: mod-severe pressed breathiness/roughness, low volume, impaired flexibility  Cardiovascular: upper extremities non-edematous  Lymphatic: no cervical lymphadenopathy  Neurologic: alert and oriented to time, place, person, and situation; cranial nerves 3-12 grossly intact  Head: normocephalic  Eyes: conjunctivae and sclerae clear  Ears: normal pinnae, normal external  auditory canals, tympanic membranes intact  Nose: mucosa pink and noncongested, no masses, no mucopurulence, no polyps  Oral cavity / oropharynx: no mucosal lesions  Neck: soft, full range of motion, laryngotracheal complex palpable with appropriate landmarks, larynx elevates on swallowing  Indirect laryngoscopy: limited due to gag    Procedure  Flexible Laryngeal Videostroboscopy (46751): Laryngeal videostroboscopy is indicated to assess laryngeal vibratory biomechanics and vocal fold oscillation, which cannot be assessed with a plain light examination. This was carried out transnasally with a distal chip videoendoscope. After verbal consent was obtained, the patient was positioned and the nose was topically decongested with 1% phenylephrine and topically anesthetized with 4% lidocaine. The endoscope was passed through the most patent nasal cavity and positioned to image the nasopharynx, larynx, and hypopharynx in detail. The following features were examined: nasopharyngeal, laryngeal, hypopharyngeal masses; velopharyngeal strength, closure, and symmetry of motion; vocal fold range and symmetry of motion; laryngeal mucosal edema, erythema, inflammation, and hydration; salivary pooling; and gross laryngeal sensation. During phonation, the vocal folds were assessed for glottal closure; mucosal wave; vocal fold lesions; vibratory periodicity, amplitude, and phase symmetry; and vertical height match. The equipment was removed. The patient tolerated the procedure well without complication. All findings were normal except:  - bulky, granular mass occupying entirety left true vocal fold and creeping into the infraglottic aspect at the the anterior commissure  - overconvexity, premature contact, and glottic over-closure, and completely deficient mucosal wave  - mild motion impairment about the left cricoarytenoid joint, but difficult to ascertain neurapraxia versus physical impairment from mass        Data Reviewed    see  HPI      Assessment:     Cyrus Batres Jr. is a 70 y.o. male with a left vocal fold mass and a history of, at least, severe dysplasia.  I have concerns for laryngeal malignancy.     Plan:        I had a discussion with the patient regarding his condition and the further workup and management options.      I recommended that he proceed to the operating room for microlaryngoscopy with biopsy.  I counseled him that, depending on intraoperative findings, further surgery might be rendered at that time.  In anticipatory fashion, I spoke about treatment options for either carcinoma in situ or mary alice laryngeal carcinoma.  Options discussed and passing included radiation or transoral laryngeal micro surgery.  Should the tumor appeared to be extending deeply into the vocal fold, or should it be extending into the subglottis, I anticipate that radiation would be a preferable treatment option to surgery.  Nevertheless, we will discuss this in more detail after microlaryngoscopy is completed and the final pathology report has been returned.    He will contact me to arrange for the surgical biopsy in the near future.    All questions were answered, and the patient is in agreement with the above.     Stew Noel M.D.  [facility]  Department of Otorhinolaryngology and Communication Sciences

## 2020-07-23 NOTE — LETTER
July 23, 2020      Jung Xiao MD  [address]  [address]  [facility] [address]           [facility]  [address]  [facility] [address]  Phone: [phone]  Fax: [phone]          Patient: Cyrus Batres Jr.   MR Number: [id number]   YOB: 1950   Date of Visit: 7/23/2020       Dear Dr. Jung Xiao:    Thank you for referring Cyrus Batres to me for evaluation. Attached you will find relevant portions of my assessment and plan of care.    If you have questions, please do not hesitate to call me. I look forward to following Cyrus Batres along with you.    Sincerely,    Stew Noel MD    Enclosure  CC:  No Recipients    If you would like to receive this communication electronically, please contact [email or web address] or [phone] to request more information on [employer] Link access.    For providers and/or their staff who would like to refer a patient to [facility], please contact us through our one-stop-shop provider referral line, [facility], at [phone].    If you feel you have received this communication in error or would no longer like to receive these types of communications, please e-mail [email or web address]

## 2020-07-23 NOTE — H&P (VIEW-ONLY)
[facility]  Department of Otorhinolaryngology and Communication Sciences    Cyrus Batres Jr. is a 70 y.o. male who [facility] for consultation at the kind request of Dr. Jung Xiao for further management of a vocal fold lesion.     He complains of hoarseness, gradual in onset, earlier this year.  Dr. Xiao noted a vocal cord lesion on the left and brought him for micro laryngeal surgery using laser in March 2020.  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  Since that time, the patient's voice has remained hoarse and has deteriorated.  He was recently re-evaluated by Dr. Xiao, who noted recurrence of the lesion.  He obtained a CT of the neck and sent him to meet with me for further evaluation and management.    The patient is a lifetime nonsmoker.  He only rarely consumes alcohol.  He reports that his father had some type of laryngeal malignancy/dysplasia issue which required 4 surgeries.    He has AFib - on ASA/Eliquis.     He is retired from working for [employer].  He moved to [place] from [place] in 2009.  He lives in [place] with his wife.    Relevant diagnostics are as follows:    Path 3/17/2020: 1. BIOPSY OF LEFT TRUE VOCAL CORD:  SEVERELY DYSPLASTIC APPEARING SQUAMOUS MUCOSA  INVASIVE CARCINOMA IS NOT DOCUMENTED  ON THE OTHER HAND, THESE FRAGMENTS ARE NODUL AND WITHOUT SUBMUCOSA FOR  EVALUATION; IT IS POSSIBLE THAT THEY REFLECT INVASIVE SQUAMOUS CARCINOMA  2. BIOPSY OF LEFT ANTERIOR COMMISSURE:  MODERATE DYSPLASIA  NO INVASIVE CARCINOMA IDENTIFIED    CT neck 7/22/2020: bulky anterior commissure mass extending out to thyroid notch and inferior aspect of thyroid notch but not ; no lymphadenopathy    Past Medical History  He has a past medical history of Allergy, Atrial fibrillation, Chronic anticoagulation, Diabetes mellitus type I, and Hypertension.    Past Surgical History  He has a past surgical history that includes Microlaryngoscopy (N/A,  3/17/2020).    Family History  His family history includes Abnormal EKG in his mother; Diabetes in his father; Heart disease in his father; Hypertension in his father.    Social History  He reports that he has never smoked. He has never used smokeless tobacco. He reports current alcohol use. He reports that he does not use drugs.    Allergies  He is allergic to pollen extracts and lovastatin.    Medications  He has a current medication list which includes the following prescription(s): aspirin, blood sugar diagnostic, ciclopirox, cyanocobalamin, eliquis, ergocalciferol, esomeprazole, fluticasone propionate, losartan, metoprolol tartrate, nateglinide, semaglutide, shingrix (pf), trazodone, metformin, and pen needle, diabetic, and the following Facility-Administered Medications: lactated ringers.    Review of Systems   Constitutional: Negative for fever.   HENT: Negative for sore throat.    Eyes: Negative for visual disturbance.   Respiratory: Negative for wheezing.    Cardiovascular: Negative for chest pain.   Gastrointestinal: Negative for nausea.   Musculoskeletal: Negative for arthralgias.   Skin: Negative for rash.   Neurological: Negative for tremors.   Hematological: Does not bruise/bleed easily.   Psychiatric/Behavioral: The patient is not nervous/anxious.           Objective:     /74   Pulse 72   Temp 98 °F (36.7 °C)   Wt 75.8 kg (167 lb 1.7 oz)   BMI 26.97 kg/m²      Physical Exam    Constitutional: comfortable, well dressed  Psychiatric: appropriate affect  Respiratory: comfortably breathing, symmetric chest rise, no stridor  Voice: mod-severe pressed breathiness/roughness, low volume, impaired flexibility  Cardiovascular: upper extremities non-edematous  Lymphatic: no cervical lymphadenopathy  Neurologic: alert and oriented to time, place, person, and situation; cranial nerves 3-12 grossly intact  Head: normocephalic  Eyes: conjunctivae and sclerae clear  Ears: normal pinnae, normal external  auditory canals, tympanic membranes intact  Nose: mucosa pink and noncongested, no masses, no mucopurulence, no polyps  Oral cavity / oropharynx: no mucosal lesions  Neck: soft, full range of motion, laryngotracheal complex palpable with appropriate landmarks, larynx elevates on swallowing  Indirect laryngoscopy: limited due to gag    Procedure  Flexible Laryngeal Videostroboscopy (08874): Laryngeal videostroboscopy is indicated to assess laryngeal vibratory biomechanics and vocal fold oscillation, which cannot be assessed with a plain light examination. This was carried out transnasally with a distal chip videoendoscope. After verbal consent was obtained, the patient was positioned and the nose was topically decongested with 1% phenylephrine and topically anesthetized with 4% lidocaine. The endoscope was passed through the most patent nasal cavity and positioned to image the nasopharynx, larynx, and hypopharynx in detail. The following features were examined: nasopharyngeal, laryngeal, hypopharyngeal masses; velopharyngeal strength, closure, and symmetry of motion; vocal fold range and symmetry of motion; laryngeal mucosal edema, erythema, inflammation, and hydration; salivary pooling; and gross laryngeal sensation. During phonation, the vocal folds were assessed for glottal closure; mucosal wave; vocal fold lesions; vibratory periodicity, amplitude, and phase symmetry; and vertical height match. The equipment was removed. The patient tolerated the procedure well without complication. All findings were normal except:  - bulky, granular mass occupying entirety left true vocal fold and creeping into the infraglottic aspect at the the anterior commissure  - overconvexity, premature contact, and glottic over-closure, and completely deficient mucosal wave  - mild motion impairment about the left cricoarytenoid joint, but difficult to ascertain neurapraxia versus physical impairment from mass        Data Reviewed    see  HPI      Assessment:     Cyrus Batres Jr. is a 70 y.o. male with a left vocal fold mass and a history of, at least, severe dysplasia.  I have concerns for laryngeal malignancy.     Plan:        I had a discussion with the patient regarding his condition and the further workup and management options.      I recommended that he proceed to the operating room for microlaryngoscopy with biopsy.  I counseled him that, depending on intraoperative findings, further surgery might be rendered at that time.  In anticipatory fashion, I spoke about treatment options for either carcinoma in situ or mary alice laryngeal carcinoma.  Options discussed and passing included radiation or transoral laryngeal micro surgery.  Should the tumor appeared to be extending deeply into the vocal fold, or should it be extending into the subglottis, I anticipate that radiation would be a preferable treatment option to surgery.  Nevertheless, we will discuss this in more detail after microlaryngoscopy is completed and the final pathology report has been returned.    He will contact me to arrange for the surgical biopsy in the near future.    All questions were answered, and the patient is in agreement with the above.     Stew Noel M.D.  [facility]  Department of Otorhinolaryngology and Communication Sciences

## 2020-07-24 ENCOUNTER — PATIENT MESSAGE (OUTPATIENT)
Dept: FAMILY MEDICINE | Facility: CLINIC | Age: 70
End: 2020-07-24

## 2020-07-24 DIAGNOSIS — D38.0 NEOPLASM OF UNCERTAIN BEHAVIOR OF LARYNX: Primary | ICD-10-CM

## 2020-07-24 RX ORDER — DEXAMETHASONE SODIUM PHOSPHATE 4 MG/ML
8 INJECTION, SOLUTION INTRA-ARTICULAR; INTRALESIONAL; INTRAMUSCULAR; INTRAVENOUS; SOFT TISSUE
Status: CANCELLED | OUTPATIENT
Start: 2020-07-24

## 2020-07-24 RX ORDER — LIDOCAINE HYDROCHLORIDE 10 MG/ML
1 INJECTION, SOLUTION EPIDURAL; INFILTRATION; INTRACAUDAL; PERINEURAL ONCE
Status: CANCELLED | OUTPATIENT
Start: 2020-07-24 | End: 2020-07-24

## 2020-07-24 NOTE — TELEPHONE ENCOUNTER
Spoke to patient. Appt on 8/3/2020 has been cancelled. Advised him to let us know if he needs a pre op clearance for his upcoming surgery. Patient verbalized understanding.

## 2020-07-27 NOTE — ANESTHESIA PAT ROS NOTE
07/27/2020  Cyrus Batres Jr. is a 70 y.o., male.      Pre-op Assessment          Review of Systems  Anesthesia Hx:  No problems with previous Anesthesia  History of prior surgery of interest to airway management or planning: Previous anesthesia: General 3/17/20-MICROLARYNGOSCOPY with general anesthesia.  Procedure performed at an [facility]. Airway issues documented on chart review include easy direct laryngoscopy  Denies Family Hx of Anesthesia complications.   Denies Personal Hx of Anesthesia complications.   Social:  Non-Smoker, Social Alcohol Use    Hematology/Oncology:  Hematology Normal   Oncology Normal     EENT/Dental:   NEOPLASM OF UNCERTAIN BEHAVIOR LARYNX   Cardiovascular:   Hypertension  Denies Angina. hyperlipidemia  Functional Capacity good / => 4 METS, WALKS 5 MILES/DAY  Disorder of Cardiac Rhythm, Atrial Fibrillation, Hx of Atrial Fibrillation, recently in sinus rhythm    Pulmonary:  Pulmonary Normal  Denies Shortness of breath.  Denies Recent URI.    Renal/:  Renal/ Normal     Hepatic/GI:   GERD, well controlled    Musculoskeletal:  Musculoskeletal Normal    Neurological:  Neurology Normal    Endocrine:   VIT D DEFICIENCY Diabetes, Type 2 Diabetes , controlled by diet, oral hypoglycemics. , most recent HgA1c value was 5.4 on 7/9/20.    Psych:   Sleep Disorder and Insomnia. Sleep Disorder and Insomnia.           Anesthesia Assessment: Preoperative EQUATION    Planned Procedure: Procedure(s) (LRB):  Suspension microlaryngoscopy with biopsy, possible KTP laser treatment/excision (N/A)  Requested Anesthesia Type:General  Surgeon: Stew Noel MD  Service: ENT  Known or anticipated Date of Surgery:8/4/2020    Surgeon notes: reviewed    Electronic QUestionnaire Assessment completed via nurse interview with patient.        Triage considerations:     The patient has no apparent  active cardiac condition (No unstable coronary Syndrome such as severe unstable angina or recent [<1 month] myocardial infarction, decompensated CHF, severe valvular   disease or significant arrhythmia)    Previous anesthesia records:GETA and No problems   Airway/Jaw/Neck:  Airway Findings: Mouth Opening: Normal Tongue: Normal  General Airway Assessment: Adult  Mallampati: I  TM Distance: Normal, at least 6 cm        03/17/20 Placement Time: 0746 Method of Intubation: Direct laryngoscopy Inserted by: CRNA Airway Device: Laser Tube Mask Ventilation: Easy Intubated: Postinduction Blade: Roe #2 Airway Device Size: 6.0 Style: Cuffed Cuff Inflation: Minimal occlusive pressure Inflation Amount (mL): -- , 5cc per balloon   Placement Verified By: Auscultation;Capnometry;ETT Condensation Grade: Grade I Complicating Factors: None Findings Post-Intubation: Positive EtCO2;Bilateral breath sounds;Atraumatic/Condition of teeth unchanged Secured at: Lips Complications: None Breath Sounds: Equal Bilateral Insertion attempts (enter comment if more than 2 attempts): 1   Last PCP note: 3-6 months ago , within [facility]   Subspecialty notes: Endocrinology, ENT    Other important co-morbidities: DM2, GERD and H/O A-FIB ON ELIQUIS & ASA      Tests already available:  Available tests,  within 1 month , within [facility] .7/20/20-COVID(NEG), 7/9/20-T4,A1C (5.4), RENAL FUNCTION PANEL, MICROALBUMIN/CREATININE 3/10/20-EKG            Instructions given. (See in Nurse's note)    Optimization:  Anesthesia Preop Clinic Assessment  Indicated-NOT INDICATED    Medical Opinion Indicated       Sub-specialist consult indicated:  MEDICATION INSTRUCTIONS       Plan:    Testing:  None Needed     Consultation:MEDICATION INSTRUCTIONS      Patient  has previously scheduled Medical Appointment:NOT AT THIS TIME    Navigation:              Consults scheduled.             Results will be tracked by Preop Clinic.      7/31/20-CARDIAC CLEARANCE AND MEDICATION  INSTRUCTIONS RECEIVED FROM , SCANNED TO MEDIA.

## 2020-07-31 ENCOUNTER — TELEPHONE (OUTPATIENT)
Dept: OTOLARYNGOLOGY | Facility: CLINIC | Age: 70
End: 2020-07-31

## 2020-07-31 DIAGNOSIS — Z03.818 ENCOUNTER FOR OBSERVATION FOR SUSPECTED EXPOSURE TO OTHER BIOLOGICAL AGENTS RULED OUT: ICD-10-CM

## 2020-07-31 NOTE — TELEPHONE ENCOUNTER
----- Message from Daiana Chavarria sent at 7/31/2020 10:22 AM CDT -----  Regarding: pt  Pt is calling to speak with the nurse pt is having surgery on Tuesday next week pt said he hasn't had the covid test done yet can you please call pt at [phone].    KARISSA

## 2020-08-01 ENCOUNTER — LAB VISIT (OUTPATIENT)
Dept: FAMILY MEDICINE | Facility: CLINIC | Age: 70
End: 2020-08-01
Payer: MEDICARE

## 2020-08-01 DIAGNOSIS — Z03.818 ENCOUNTER FOR OBSERVATION FOR SUSPECTED EXPOSURE TO OTHER BIOLOGICAL AGENTS RULED OUT: ICD-10-CM

## 2020-08-01 PROCEDURE — U0003 INFECTIOUS AGENT DETECTION BY NUCLEIC ACID (DNA OR RNA); SEVERE ACUTE RESPIRATORY SYNDROME CORONAVIRUS 2 (SARS-COV-2) (CORONAVIRUS DISEASE [COVID-19]), AMPLIFIED PROBE TECHNIQUE, MAKING USE OF HIGH THROUGHPUT TECHNOLOGIES AS DESCRIBED BY CMS-2020-01-R: HCPCS

## 2020-08-02 LAB — SARS-COV-2 RNA RESP QL NAA+PROBE: NOT DETECTED

## 2020-08-03 ENCOUNTER — TELEPHONE (OUTPATIENT)
Dept: OTOLARYNGOLOGY | Facility: CLINIC | Age: 70
End: 2020-08-03

## 2020-08-04 ENCOUNTER — HOSPITAL ENCOUNTER (OUTPATIENT)
Facility: HOSPITAL | Age: 70
Discharge: HOME | End: 2020-08-04
Attending: OTOLARYNGOLOGY | Admitting: OTOLARYNGOLOGY
Payer: MEDICARE

## 2020-08-04 ENCOUNTER — ANESTHESIA EVENT (OUTPATIENT)
Dept: SURGERY | Facility: HOSPITAL | Age: 70
End: 2020-08-04
Payer: MEDICARE

## 2020-08-04 ENCOUNTER — ANESTHESIA (OUTPATIENT)
Dept: SURGERY | Facility: HOSPITAL | Age: 70
End: 2020-08-04
Payer: MEDICARE

## 2020-08-04 VITALS
HEART RATE: 91 BPM | BODY MASS INDEX: 24.64 KG/M2 | TEMPERATURE: 97 F | HEIGHT: 67 IN | WEIGHT: 157 LBS | DIASTOLIC BLOOD PRESSURE: 69 MMHG | RESPIRATION RATE: 19 BRPM | OXYGEN SATURATION: 100 % | SYSTOLIC BLOOD PRESSURE: 124 MMHG

## 2020-08-04 DIAGNOSIS — C32.9 LARYNX NEOPLASM MALIGNANT: Primary | ICD-10-CM

## 2020-08-04 DIAGNOSIS — D38.0 NEOPLASM OF UNCERTAIN BEHAVIOR OF LARYNX: Primary | ICD-10-CM

## 2020-08-04 LAB
POCT GLUCOSE: 95 MG/DL (ref 70–110)
POCT GLUCOSE: 98 MG/DL (ref 70–110)

## 2020-08-04 PROCEDURE — 31536 PR LARYNGOSCOPY,DIRCT,OP SCOPE,BIOPSY: ICD-10-PCS | Mod: ,,, | Performed by: OTOLARYNGOLOGY

## 2020-08-04 PROCEDURE — 71000016 HC POSTOP RECOV ADDL HR: Performed by: OTOLARYNGOLOGY

## 2020-08-04 PROCEDURE — 88305 TISSUE EXAM BY PATHOLOGIST: CPT | Mod: 26,,, | Performed by: PATHOLOGY

## 2020-08-04 PROCEDURE — 25000003 PHARM REV CODE 250: Performed by: NURSE ANESTHETIST, CERTIFIED REGISTERED

## 2020-08-04 PROCEDURE — 31536 LARYNGOSCOPY W/BX & OP SCOPE: CPT | Mod: ,,, | Performed by: OTOLARYNGOLOGY

## 2020-08-04 PROCEDURE — 88305 TISSUE EXAM BY PATHOLOGIST: CPT | Mod: 59 | Performed by: PATHOLOGY

## 2020-08-04 PROCEDURE — 36000708 HC OR TIME LEV III 1ST 15 MIN: Performed by: OTOLARYNGOLOGY

## 2020-08-04 PROCEDURE — 71000044 HC DOSC ROUTINE RECOVERY FIRST HOUR: Performed by: OTOLARYNGOLOGY

## 2020-08-04 PROCEDURE — 71000015 HC POSTOP RECOV 1ST HR: Performed by: OTOLARYNGOLOGY

## 2020-08-04 PROCEDURE — 88331 PR  PATH CONSULT IN SURG,W FRZ SEC: ICD-10-PCS | Mod: 26,,, | Performed by: PATHOLOGY

## 2020-08-04 PROCEDURE — 36000709 HC OR TIME LEV III EA ADD 15 MIN: Performed by: OTOLARYNGOLOGY

## 2020-08-04 PROCEDURE — 37000008 HC ANESTHESIA 1ST 15 MINUTES: Performed by: OTOLARYNGOLOGY

## 2020-08-04 PROCEDURE — D9220A PRA ANESTHESIA: Mod: ANES,,, | Performed by: ANESTHESIOLOGY

## 2020-08-04 PROCEDURE — D9220A PRA ANESTHESIA: ICD-10-PCS | Mod: ANES,,, | Performed by: ANESTHESIOLOGY

## 2020-08-04 PROCEDURE — 88305 TISSUE EXAM BY PATHOLOGIST: ICD-10-PCS | Mod: 26,,, | Performed by: PATHOLOGY

## 2020-08-04 PROCEDURE — 88331 PATH CONSLTJ SURG 1 BLK 1SPC: CPT | Performed by: PATHOLOGY

## 2020-08-04 PROCEDURE — D9220A PRA ANESTHESIA: Mod: CRNA,,, | Performed by: NURSE ANESTHETIST, CERTIFIED REGISTERED

## 2020-08-04 PROCEDURE — D9220A PRA ANESTHESIA: ICD-10-PCS | Mod: CRNA,,, | Performed by: NURSE ANESTHETIST, CERTIFIED REGISTERED

## 2020-08-04 PROCEDURE — 88331 PATH CONSLTJ SURG 1 BLK 1SPC: CPT | Mod: 26,,, | Performed by: PATHOLOGY

## 2020-08-04 PROCEDURE — 63600175 PHARM REV CODE 636 W HCPCS: Performed by: OTOLARYNGOLOGY

## 2020-08-04 PROCEDURE — 25000003 PHARM REV CODE 250: Performed by: OTOLARYNGOLOGY

## 2020-08-04 PROCEDURE — 37000009 HC ANESTHESIA EA ADD 15 MINS: Performed by: OTOLARYNGOLOGY

## 2020-08-04 PROCEDURE — 82962 GLUCOSE BLOOD TEST: CPT | Performed by: OTOLARYNGOLOGY

## 2020-08-04 PROCEDURE — 27201423 OPTIME MED/SURG SUP & DEVICES STERILE SUPPLY: Performed by: OTOLARYNGOLOGY

## 2020-08-04 PROCEDURE — 63600175 PHARM REV CODE 636 W HCPCS: Performed by: NURSE ANESTHETIST, CERTIFIED REGISTERED

## 2020-08-04 RX ORDER — MIDAZOLAM HYDROCHLORIDE 1 MG/ML
INJECTION, SOLUTION INTRAMUSCULAR; INTRAVENOUS
Status: DISCONTINUED | OUTPATIENT
Start: 2020-08-04 | End: 2020-08-04

## 2020-08-04 RX ORDER — PROPOFOL 10 MG/ML
VIAL (ML) INTRAVENOUS
Status: DISCONTINUED | OUTPATIENT
Start: 2020-08-04 | End: 2020-08-04

## 2020-08-04 RX ORDER — LIDOCAINE HYDROCHLORIDE 40 MG/ML
INJECTION, SOLUTION RETROBULBAR
Status: DISCONTINUED | OUTPATIENT
Start: 2020-08-04 | End: 2020-08-04 | Stop reason: HOSPADM

## 2020-08-04 RX ORDER — IBUPROFEN 400 MG/1
400 TABLET ORAL EVERY 6 HOURS PRN
Qty: 30 TABLET | Refills: 0 | Status: ON HOLD | OUTPATIENT
Start: 2020-08-04 | End: 2022-01-06 | Stop reason: ALTCHOICE

## 2020-08-04 RX ORDER — PHENYLEPHRINE HYDROCHLORIDE 10 MG/ML
INJECTION INTRAVENOUS
Status: DISCONTINUED | OUTPATIENT
Start: 2020-08-04 | End: 2020-08-04

## 2020-08-04 RX ORDER — FENTANYL CITRATE 50 UG/ML
25 INJECTION, SOLUTION INTRAMUSCULAR; INTRAVENOUS EVERY 5 MIN PRN
Status: DISCONTINUED | OUTPATIENT
Start: 2020-08-04 | End: 2020-08-04 | Stop reason: HOSPADM

## 2020-08-04 RX ORDER — LORAZEPAM 2 MG/ML
0.25 INJECTION INTRAMUSCULAR ONCE AS NEEDED
Status: DISCONTINUED | OUTPATIENT
Start: 2020-08-04 | End: 2020-08-04 | Stop reason: HOSPADM

## 2020-08-04 RX ORDER — KETAMINE HCL IN 0.9 % NACL 50 MG/5 ML
SYRINGE (ML) INTRAVENOUS
Status: DISCONTINUED | OUTPATIENT
Start: 2020-08-04 | End: 2020-08-04

## 2020-08-04 RX ORDER — FENTANYL CITRATE 50 UG/ML
INJECTION, SOLUTION INTRAMUSCULAR; INTRAVENOUS
Status: DISCONTINUED | OUTPATIENT
Start: 2020-08-04 | End: 2020-08-04

## 2020-08-04 RX ORDER — LIDOCAINE HYDROCHLORIDE 10 MG/ML
1 INJECTION, SOLUTION EPIDURAL; INFILTRATION; INTRACAUDAL; PERINEURAL ONCE
Status: COMPLETED | OUTPATIENT
Start: 2020-08-04 | End: 2020-08-04

## 2020-08-04 RX ORDER — ROCURONIUM BROMIDE 10 MG/ML
INJECTION, SOLUTION INTRAVENOUS
Status: DISCONTINUED | OUTPATIENT
Start: 2020-08-04 | End: 2020-08-04

## 2020-08-04 RX ORDER — EPINEPHRINE 1 MG/ML
INJECTION, SOLUTION INTRACARDIAC; INTRAMUSCULAR; INTRAVENOUS; SUBCUTANEOUS
Status: DISCONTINUED | OUTPATIENT
Start: 2020-08-04 | End: 2020-08-04 | Stop reason: HOSPADM

## 2020-08-04 RX ORDER — ACETAMINOPHEN 325 MG/1
650 TABLET ORAL EVERY 6 HOURS PRN
Status: DISCONTINUED | OUTPATIENT
Start: 2020-08-04 | End: 2020-08-04 | Stop reason: HOSPADM

## 2020-08-04 RX ORDER — DIPHENHYDRAMINE HYDROCHLORIDE 50 MG/ML
25 INJECTION INTRAMUSCULAR; INTRAVENOUS EVERY 6 HOURS PRN
Status: DISCONTINUED | OUTPATIENT
Start: 2020-08-04 | End: 2020-08-04 | Stop reason: HOSPADM

## 2020-08-04 RX ORDER — LIDOCAINE HYDROCHLORIDE 20 MG/ML
INJECTION INTRAVENOUS
Status: DISCONTINUED | OUTPATIENT
Start: 2020-08-04 | End: 2020-08-04

## 2020-08-04 RX ORDER — ACETAMINOPHEN 500 MG
500 TABLET ORAL EVERY 6 HOURS PRN
Qty: 30 TABLET | Refills: 0 | Status: ON HOLD | OUTPATIENT
Start: 2020-08-04 | End: 2022-01-14 | Stop reason: HOSPADM

## 2020-08-04 RX ADMIN — LIDOCAINE HYDROCHLORIDE 100 MG: 20 INJECTION, SOLUTION INTRAVENOUS at 11:08

## 2020-08-04 RX ADMIN — SUGAMMADEX 200 MG: 100 INJECTION, SOLUTION INTRAVENOUS at 12:08

## 2020-08-04 RX ADMIN — LIDOCAINE HYDROCHLORIDE 10 MG: 10 INJECTION, SOLUTION EPIDURAL; INFILTRATION; INTRACAUDAL; PERINEURAL at 10:08

## 2020-08-04 RX ADMIN — ROCURONIUM BROMIDE 50 MG: 10 INJECTION, SOLUTION INTRAVENOUS at 11:08

## 2020-08-04 RX ADMIN — Medication 25 MG: at 11:08

## 2020-08-04 RX ADMIN — FENTANYL CITRATE 100 MCG: 50 INJECTION, SOLUTION INTRAMUSCULAR; INTRAVENOUS at 11:08

## 2020-08-04 RX ADMIN — PHENYLEPHRINE HYDROCHLORIDE 200 MCG: 10 INJECTION INTRAVENOUS at 12:08

## 2020-08-04 RX ADMIN — PROPOFOL 200 MG: 10 INJECTION, EMULSION INTRAVENOUS at 11:08

## 2020-08-04 RX ADMIN — MIDAZOLAM HYDROCHLORIDE 2 MG: 1 INJECTION, SOLUTION INTRAMUSCULAR; INTRAVENOUS at 11:08

## 2020-08-04 RX ADMIN — SODIUM CHLORIDE, SODIUM GLUCONATE, SODIUM ACETATE, POTASSIUM CHLORIDE, MAGNESIUM CHLORIDE, SODIUM PHOSPHATE, DIBASIC, AND POTASSIUM PHOSPHATE: .53; .5; .37; .037; .03; .012; .00082 INJECTION, SOLUTION INTRAVENOUS at 11:08

## 2020-08-04 NOTE — OP NOTE
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    PROCEDURE(S): Suspension microlaryngoscopy with biopsy.    SURGEON: Stew Noel M.D.    ASSISTANT: IDA Herrera M.D.    ANESTHESIA: General.    BLOOD LOSS: Less than 5 mL.    SPECIMENS: Left vocal fold.    COMPLICATIONS: None.    FINDINGS: Bulky tumor deeply extending into the left true vocal fold, encroaching across the anterior commissure, involving the left false vocal fold, and extending into the infraglottic region for 8 mm.    CONDITION: Stable.    INDICATIONS FOR PROCEDURE: This is a patient with a left vocal fold mass which was operated in 3/2020. Pathology was consistent with severe dysplasia. I have concerns for malignancy. He presents today for microlaryngoscopy with biopsy. I discussed the risks, benefits and alternatives to surgery with the patient, as well as the expected postoperative course. Risks included but were not limited to pain; bleeding; infection; scarring; worsening of voice; recurrence; need for additional and/or more extensive procedures; oral/dental problems (pain, laceration, broken or missing teeth); jaw joint problems (pain, dislocation); and tongue problems (pain, laceration, numbness, weakness, taste disturbance). Any surgery on the larynx also carries with it the risks of airway obstruction necessitating tracheotomy. Also inherent in the procedure are the risks of general anesthesia, including but not limited to cardiovascular complications (heart attack, arrhythmia); pulmonary (respiratory failure); neurologic (stroke); and death. I gave the patient the opportunity to ask questions and I answered all of them. He wishes to proceed. Informed consent was obtained.      PROCEDURE IN DETAIL:   The patient was positively identified in the preoperative holding area, then was brought to the operating room and placed in  the flat supine position. General endotracheal anesthesia was obtained using a 6-0 laser safe endotracheal tube which was secured to the left lower lip. The eyes were protected with moist sponges. The teeth were protected using a reinforced mouthguard. A final timeout was performed for verification purposes. The 6 Zeitels laryngoscope was inserted into the oral cavity and was utilized to expose the larynx. The patient was suspended from the [place]. Magnified laryngeal endoscopy was carried out using a 0 degree Ma dimple telescope connected to a video monitor. Findings were as noted above. Photodocumentation was obtained.  Next, attention was turned to performing surgical intervention as indicated. The operating microscope was brought into the field, and the remainder of the case was performed under maximal magnification.  Multiple samples were taken of the left vocal fold tumor. These were passed off for frozen section analysis. Meanwhile, continued debulking of the tumor was performed. Findings were consistent with deep invasion. The remaining fragments were passed off for permanent analysis. Hemostasis was achieved with temporary placement of an epinephrine soaked cottonoid.  With this, the procedure was brought to completion. The larynx was topically anesthetized with 3 mL of 4% lidocaine. All equipment was removed. The patient was turned back over to the anesthesiology team for awakening and extubation, which were uneventful. The patient was escorted to the recovery room in good condition. The patient tolerated the procedure well without complications. All needle, sponge, and instrument counts were correct at the completion of the case.    ATTESTATION:  As the attending of record, I was present and participated in all portions of this procedure.

## 2020-08-04 NOTE — ANESTHESIA PREPROCEDURE EVALUATION
08/04/2020  Cyrus Batres Jr. is a 70 y.o., male.    Anesthesia Evaluation    I have reviewed the Patient Summary Reports.    I have reviewed the Nursing Notes. I have reviewed the NPO Status.   I have reviewed the Medications.     Review of Systems  Anesthesia Hx:  No problems with previous Anesthesia  History of prior surgery of interest to airway management or planning: Previous anesthesia: General Denies Family Hx of Anesthesia complications.   Denies Personal Hx of Anesthesia complications.   Social:  Non-Smoker    Hematology/Oncology:  Hematology Normal      Hematology Comments: Chronic anticoagulation Current/Recent Cancer. Oncology Comments: Laryngeal neoplasm     EENT/Dental:EENT/Dental Normal   Cardiovascular:   Exercise tolerance: good Hypertension Dysrhythmias atrial fibrillation hyperlipidemia    Pulmonary:  Pulmonary Normal    Renal/:  Renal/ Normal     Hepatic/GI:   GERD Liver Disease, Fatty liver   Musculoskeletal:  Musculoskeletal Normal    Neurological:  Neurology Normal    Endocrine:   Diabetes, well controlled, type 2    Dermatological:  Skin Normal    Psych:  Psychiatric Normal           Physical Exam  General:  Well nourished    Airway/Jaw/Neck:  Airway Findings: Mouth Opening: Normal Tongue: Normal  General Airway Assessment: Adult, Average  Mallampati: II  Improves to II with phonation.  TM Distance: Normal, at least 6 cm        Eyes/Ears/Nose:  EYES/EARS/NOSE FINDINGS: Normal   Dental:  Dental Findings: In tact   Chest/Lungs:  Chest/Lungs Findings: Clear to auscultation, Normal Respiratory Rate     Heart/Vascular:  Heart Findings: Rate: Normal  Rhythm: Regular Rhythm  Sounds: Normal  Heart murmur: negative Vascular Findings: Normal    Abdomen:  Abdomen Findings: Normal    Musculoskeletal:  Musculoskeletal Findings: Normal   Skin:  Skin Findings: Normal    Mental Status:  Mental  Status Findings:  Cooperative, Alert and Oriented         Anesthesia Plan  Type of Anesthesia, risks & benefits discussed:  Anesthesia Type:  general  Patient's Preference:   Intra-op Monitoring Plan: standard ASA monitors  Intra-op Monitoring Plan Comments:   Post Op Pain Control Plan:   Post Op Pain Control Plan Comments:   Induction:   IV  Beta Blocker:  Patient is on a Beta-Blocker and has received one dose within the past 24 hours (No further documentation required).       Informed Consent: Patient understands risks and agrees with Anesthesia plan.  Questions answered. Anesthesia consent signed with patient.  ASA Score: 2     Day of Surgery Review of History & Physical:            Ready For Surgery From Anesthesia Perspective.

## 2020-08-04 NOTE — INTERVAL H&P NOTE
The patient has been examined and the H&P has been reviewed:    I concur with the findings and no changes have occurred since H&P was written.    Surgery risks, benefits and alternative options discussed and understood by patient/family.          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

## 2020-08-04 NOTE — TRANSFER OF CARE
"Anesthesia Transfer of Care Note    Patient: Cyrus Batres Jr.    Procedure(s) Performed: Procedure(s) (LRB):  Suspension microlaryngoscopy with biopsy, possible KTP laser treatment/excision (N/A)    Patient location: PACU    Anesthesia Type: general    Transport from OR: Transported from OR on 6-10 L/min O2 by face mask with adequate spontaneous ventilation    Post pain: adequate analgesia    Post assessment: tolerated procedure well and no apparent anesthetic complications    Post vital signs: stable    Level of consciousness: awake    Nausea/Vomiting: no nausea/vomiting    Complications: none    Transfer of care protocol was followed      Last vitals:   Visit Vitals  BP (!) 115/59   Pulse 92   Temp 36.7 °C (98 °F) (Temporal)   Resp 16   Ht 5' 6.5" (1.689 m)   Wt 71.2 kg (157 lb)   SpO2 100%   BMI 24.96 kg/m²     "

## 2020-08-04 NOTE — PLAN OF CARE
Discharge instructions reviewed with pt and wife at bedside. Understanding verbalized. No complaints of pain reported. Pt able to tolerate po intake. Bedside delivery of medications occurred. To be transported to car by PCT.

## 2020-08-04 NOTE — DISCHARGE INSTRUCTIONS
MICROLARYNGOSCOPY    Description  Laryngoscopy is a procedure in which the surgeon closely examines the larynx (voice box). The key instrument for laryngoscopy is the laryngoscope, which is a hollow metal tube inserted into the mouth. Microlaryngoscopy entails--at minimum--a magnified examination of the larynx using a microscope and/or telescopes. This is performed in the operating room. This allows the surgeon to achieve a diagnosis and also to perform precise treatment for problems on the vocal folds (vocal cords) or other parts of the larynx. Additional interventions may be performed in conjunction with microlaryngoscopy. Common interventions include but are not limited to   Biopsy   Removal of abnormal tissue (polyps, cysts, nodules, leukoplakia)   Resection of cancer   Laser treatment of abnormal tissue (papilloma, leukoplakia)   Vocal fold injection augmentation   Injection of medication (steroid, Avastin)    Instructions: before the procedure  1. NPO after midnight: This is a medical expression for nothing to eat or drink after midnight. It is important to refrain from eating or drinking anything after midnight the night before your surgery.   2. Please refrain from taking any anti-platelet medications such as aspirin; ibuprofen (Advil, Motrin); Aleve; or Plavix (clopidogrel) for 7 days prior to the procedure.  3. If you usually take Coumadin (warfarin), you may need to stop using this a few days prior to the injection.   4. If you are any type of blood thinning (anti-platelet or anti-coagulant) medication and it is not clear what you should do, please clarify this with your surgeon ahead of time. In some cases we rely on other physicians such as cardiologists or hematologists to help with these decisions.  5. Diabetes precautions: If you are usually take oral diabetes medications (such as metformin), refrain from taking your morning dose the day of the procedure and use sliding-scale  insulin for blood sugar control.  6. On the morning of your procedure, it is OK to take your other regular morning medications with a small sip of water. It is particularly important to take any medications that treat heart problems (such as blood pressure, heart rate, heart rhythm) and lung problems  (asthma, COPD). Otherwise, please remember: nothing to eat or drink after midnight.      What to expect during the procedure  Microlaryngoscopy is performed in the operating room while you are asleep under general anesthesia. In most instances, you can expect to be under general anesthesia for approximately 1 to 1 ½ hours.  Nevertheless, the duration of the procedure varies depending on the indication for surgery, intraoperative findings, and other patient-specific/anatomic issues. Our primary concerns are your safety and comfort. Our secondary goal is to provide you with the best possible surgical treatment for your problem. Your surgery will take as long as necessary to accomplish these goals.    What to expect afterwards  The laryngoscope is inserted through the mouth and presses on the tongue. The most common postoperative issues patients encounter are related to the laryngoscope being positioned in the mouth. The extent of these issues is related to patient-specific anatomic issues, the indications for surgery, and the duration of the procedure.    Pain. We will do everything we can to make sure you are as comfortable as possible. Most patients experience very little discomfort after this surgery. Nevertheless, you should expect some soreness in the mouth and throat. Because the ear and the throat share the same sensory nerve, you may also experience some discomfort in the ear. The discomfort is usually worst for the first 48-72 hours and usually resolves within a week. You will be prescribed pain medication, but most patients are sufficiently comfortable with plain Tylenol as needed.    Laceration. Some patients  may notice a small cut in the tongue or in another part of the mouth or throat. This may result in a minor about of blood-tinged saliva for the first 24 hours. This usually heals on its own over the course of a week.    Other tongue problems. As the scope presses down on the tongue, the taste buds get compressed. In addition, sometimes the nerves to the tongue also get compressed. As a result, some patients notice a disturbance in taste, numbness of the tongue, or (even more rarely) weakness of the tongue after surgery. Although sometimes it may take several weeks to months for the problem to completely go away, the tongue problems are temporary in almost every instance.    Tooth problems. Reinforced mouth guards are placed on the teeth to protect them during the surgery and we give a great deal of care and attention to minimizing any pressure on the teeth. However, a chipped or cracked tooth, loss of a tooth, and/or other tooth irregularities are rare but well-recognized complications of laryngoscopy.    Jaw problems. You may experience some pain in the jaw joints (in front of the ears). Even less frequent would be dislocation of the joint. This usually occurs in patients who already have jaw problems.    Instructions: after the procedure  1. Please take the prescribed pain medication as directed. If you are still having discomfort after the prescription runs out, or if you would rather not take a prescription narcotic pain medicine, you may instead take plain acetaminophen (Tylenol) as directed on the packaging.  2. Voice rest is not required for you, but even if voice rest is not prescribed, for the first week, you should avoid speaking over heavy background noise or in a very loud voice.   3. Please call our office at [phone] if  - You have a temperature above 101°F  - You develop Increasing pain not relieved by medications  - You have any other questions or concerns  4. Please go immediately to the  nearest emergency room if you are experiencing  - Shortness of breath  - Difficulty breathing  - Severe bleeding      Anesthesia: General Anesthesia     You are watched continuously during your procedure by your anesthesia provider.     Youre due to have surgery. During surgery, youll be given medicine called anesthesia or anesthetic. This will keep you comfortable and pain-free. Your anesthesia provider will use general anesthesia.  What is general anesthesia?  General anesthesia puts you into a state like deep sleep. It goes into the bloodstream (IV anesthetics), into the lungs (gas anesthetics), or both. You feel nothing during the procedure. You will not remember it. During the procedure, the anesthesia provider monitors you continuously. He or she checks your heart rate and rhythm, blood pressure, breathing, and blood oxygen.  · IV anesthetics. IV anesthetics are given through an IV line in your arm. Theyre often given first. This is so you are asleep before a gas anesthetic is started. Some kinds of IV anesthetics relieve pain. Others relax you. Your doctor will decide which kind is best in your case.  · Gas anesthetics. Gas anesthetics are breathed into the lungs. They are often used to keep you asleep. They can be given through a facemask or a tube placed in your larynx or trachea (breathing tube).  ¨ If you have a facemask, your anesthesia provider will most likely place it over your nose and mouth while youre still awake. Youll breathe oxygen through the mask as your IV anesthetic is started. Gas anesthetic may be added through the mask.  ¨ If you have a tube in the larynx or trachea, it will be inserted into your throat after youre asleep.  Anesthesia tools and medicines  You will likely have:  · IV anesthetics. These are put into an IV line into your bloodstream.  · Gas anesthetics. You breathe these anesthetics into your lungs, where they pass into your bloodstream.  · Pulse oximeter. This is a  small clip that is attached to the end of your finger. This measures your blood oxygen level.  · Electrocardiography leads (electrodes). These are small sticky pads that are placed on your chest. They record your heart rate and rhythm.  · Blood pressure cuff. This reads your blood pressure.  Risks and possible complications  General anesthesia has some risks. These include:  · Breathing problems  · Nausea and vomiting  · Sore throat or hoarseness (usually temporary)  · Allergic reaction to the anesthetic  · Irregular heartbeat (rare)  · Cardiac arrest (rare)   Anesthesia safety  · Follow all instructions you are given for how long not to eat or drink before your procedure.  · Be sure your doctor knows what medicines and drugs you take. This includes over-the-counter medicines, herbs, supplements, alcohol or other drugs. You will be asked when those were last taken.  · Have an adult family member or friend drive you home after the procedure.  · For the first 24 hours after your surgery:  ¨ Do not drive or use heavy equipment.  ¨ Do not make important decisions or sign legal documents. If important decisions or signing legal documents is necessary during the first 24 hours after surgery, have a trusted family member or spouse act on your behalf.  ¨ Avoid alcohol.  ¨ Have a responsible adult stay with you. He or she can watch for problems and help keep you safe.  Date Last Reviewed: 12/1/2016  © 8132-5437 [employer]. [address] [place] [address], [place] [address]. All rights reserved. This information is not intended as a substitute for professional medical care. Always follow your healthcare professional's instructions.

## 2020-08-04 NOTE — ANESTHESIA POSTPROCEDURE EVALUATION
Anesthesia Post Evaluation    Patient: Cyrus Batres Jr.    Procedure(s) Performed: Procedure(s) (LRB):  Suspension microlaryngoscopy with biopsy, possible KTP laser treatment/excision (N/A)    Final Anesthesia Type: general    Patient location during evaluation: PACU  Patient participation: Yes- Able to Participate  Level of consciousness: awake and alert and oriented  Post-procedure vital signs: reviewed and stable  Pain management: adequate  Airway patency: patent    PONV status at discharge: No PONV  Anesthetic complications: no      Cardiovascular status: hemodynamically stable  Respiratory status: unassisted, spontaneous ventilation and room air  Hydration status: euvolemic  Follow-up not needed.          Vitals Value Taken Time   /69 08/04/20 1417   Temp 36.2 °C (97.2 °F) 08/04/20 1415   Pulse 89 08/04/20 1418   Resp 25 08/04/20 1418   SpO2 99 % 08/04/20 1418   Vitals shown include unvalidated device data.      No case tracking events are documented in the log.      Pain/Alexi Score: Alexi Score: 10 (8/4/2020  2:36 PM)

## 2020-08-04 NOTE — ANESTHESIA PROCEDURE NOTES
Intubation  Performed by: Adela Mabry CRNA  Authorized by: Jarrod Britton MD     Intubation:     Induction:  Intravenous    Intubated:  Postinduction    Mask Ventilation:  Easy mask    Attempts:  1    Attempted By:  Student    Method of Intubation:  Direct    Blade:  Eleuterio 3    Laryngeal View Grade: Grade I - full view of chords      Difficult Airway Encountered?: No      Complications:  None    Airway Device:  Laser tube    Airway Device Size:  6.0    Style/Cuff Inflation:  Cuffed    Inflation Amount (mL):  10    Tube secured:  21    Secured at:  The lips    Placement Verified By:  Capnometry    Complicating Factors:  None    Findings Post-Intubation:  BS equal bilateral

## 2020-08-04 NOTE — BRIEF OP NOTE
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    DISPOSITION: Home or Self Care    FINAL DIAGNOSIS:  Squamous Cell carcinoma of the larynx    FOLLOWUP: In clinic    DISCHARGE INSTRUCTIONS:    Discharge Procedure Orders   Diet Adult Regular     Notify your health care provider if you experience any of the following:  difficulty breathing or increased cough     No dressing needed     Activity as tolerated

## 2020-08-06 LAB
FINAL PATHOLOGIC DIAGNOSIS: NORMAL
FROZEN SECTION DIAGNOSIS: NORMAL
FROZEN SECTION FOOTNOTE: NORMAL
GROSS: NORMAL

## 2020-08-10 ENCOUNTER — HOSPITAL ENCOUNTER (OUTPATIENT)
Dept: RADIOLOGY | Facility: HOSPITAL | Age: 70
Discharge: HOME | End: 2020-08-10
Attending: OTOLARYNGOLOGY
Payer: MEDICARE

## 2020-08-10 DIAGNOSIS — C32.9 LARYNX NEOPLASM MALIGNANT: ICD-10-CM

## 2020-08-10 PROCEDURE — 25500020 PHARM REV CODE 255: Performed by: OTOLARYNGOLOGY

## 2020-08-10 PROCEDURE — 71260 CT THORAX DX C+: CPT | Mod: TC

## 2020-08-10 RX ADMIN — IOHEXOL 100 ML: 350 INJECTION, SOLUTION INTRAVENOUS at 08:08

## 2020-08-17 ENCOUNTER — SOCIAL WORK (OUTPATIENT)
Dept: HEMATOLOGY/ONCOLOGY | Facility: CLINIC | Age: 70
End: 2020-08-17

## 2020-08-17 ENCOUNTER — TELEPHONE (OUTPATIENT)
Dept: RADIATION ONCOLOGY | Facility: CLINIC | Age: 70
End: 2020-08-17

## 2020-08-17 ENCOUNTER — OFFICE VISIT (OUTPATIENT)
Dept: RADIATION ONCOLOGY | Facility: CLINIC | Age: 70
End: 2020-08-17
Payer: MEDICARE

## 2020-08-17 VITALS
WEIGHT: 167.19 LBS | TEMPERATURE: 99 F | OXYGEN SATURATION: 99 % | BODY MASS INDEX: 26.58 KG/M2 | DIASTOLIC BLOOD PRESSURE: 82 MMHG | HEART RATE: 67 BPM | SYSTOLIC BLOOD PRESSURE: 149 MMHG

## 2020-08-17 DIAGNOSIS — R13.13 DYSPHAGIA, PHARYNGEAL PHASE: ICD-10-CM

## 2020-08-17 DIAGNOSIS — C32.0 MALIGNANT NEOPLASM OF TRUE VOCAL CORD: Primary | ICD-10-CM

## 2020-08-17 DIAGNOSIS — C32.9 LARYNX NEOPLASM MALIGNANT: ICD-10-CM

## 2020-08-17 PROCEDURE — 3079F DIAST BP 80-89 MM HG: CPT | Mod: S$GLB,,, | Performed by: RADIOLOGY

## 2020-08-17 PROCEDURE — 99205 PR OFFICE/OUTPT VISIT, NEW, LEVL V, 60-74 MIN: ICD-10-PCS | Mod: S$GLB,,, | Performed by: RADIOLOGY

## 2020-08-17 PROCEDURE — 1101F PR PT FALLS ASSESS DOC 0-1 FALLS W/OUT INJ PAST YR: ICD-10-PCS | Mod: S$GLB,,, | Performed by: RADIOLOGY

## 2020-08-17 PROCEDURE — 99205 OFFICE O/P NEW HI 60 MIN: CPT | Mod: S$GLB,,, | Performed by: RADIOLOGY

## 2020-08-17 PROCEDURE — 3008F PR BODY MASS INDEX (BMI) DOCUMENTED: ICD-10-PCS | Mod: S$GLB,,, | Performed by: RADIOLOGY

## 2020-08-17 PROCEDURE — 1126F AMNT PAIN NOTED NONE PRSNT: CPT | Mod: S$GLB,,, | Performed by: RADIOLOGY

## 2020-08-17 PROCEDURE — 3079F PR MOST RECENT DIASTOLIC BLOOD PRESSURE 80-89 MM HG: ICD-10-PCS | Mod: S$GLB,,, | Performed by: RADIOLOGY

## 2020-08-17 PROCEDURE — 1101F PT FALLS ASSESS-DOCD LE1/YR: CPT | Mod: S$GLB,,, | Performed by: RADIOLOGY

## 2020-08-17 PROCEDURE — 3077F SYST BP >= 140 MM HG: CPT | Mod: S$GLB,,, | Performed by: RADIOLOGY

## 2020-08-17 PROCEDURE — 1126F PR PAIN SEVERITY QUANTIFIED, NO PAIN PRESENT: ICD-10-PCS | Mod: S$GLB,,, | Performed by: RADIOLOGY

## 2020-08-17 PROCEDURE — 1159F MED LIST DOCD IN RCRD: CPT | Mod: S$GLB,,, | Performed by: RADIOLOGY

## 2020-08-17 PROCEDURE — 3077F PR MOST RECENT SYSTOLIC BLOOD PRESSURE >= 140 MM HG: ICD-10-PCS | Mod: S$GLB,,, | Performed by: RADIOLOGY

## 2020-08-17 PROCEDURE — 1159F PR MEDICATION LIST DOCUMENTED IN MEDICAL RECORD: ICD-10-PCS | Mod: S$GLB,,, | Performed by: RADIOLOGY

## 2020-08-17 PROCEDURE — 3008F BODY MASS INDEX DOCD: CPT | Mod: S$GLB,,, | Performed by: RADIOLOGY

## 2020-08-17 NOTE — PROGRESS NOTES
I met with patient and his wife to complete new patient orientation and the NCCN Distress Screening: he indicated a rating of 0.  Patient denied needing psychosocial support at this time.  I provided the couple my contact information in the event they need supportive services in the future.

## 2020-08-17 NOTE — PATIENT INSTRUCTIONS
Patient given education and instructions on dental care. Radiation therapy to the Head and neck instruction sheet given along with skin care  Instructions.   ROSIO booklet given.  Patient and spouse verbalized understanding.

## 2020-08-17 NOTE — PROGRESS NOTES
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 He has a +FHx of similar diagnosis in his father who was a smoker (he reports benign--laryngeal polyps?).    Patient was evaluated by Dr. Xiao with DFL noting irregularity at L TVC and short interval f/u with microlaryngoscopy where he noted the lesion at the superior surface of the left vocal cord without definitive invasion of the anterior commissure and possibly 1 mm of subglottic extension. Biopsy from the left true vocal cord demonstrated severe dysplastic changes without definitive invasive carcinoma though suspicious.  The left anterior commissure biopsy demonstrated moderate dysplasia without malignancy. CT of the neck and soft tissues appreciating an enhancing 6 mm focus at the superior aspect of the left vocal cord near the anterior commissure.    Follow-up DFL was suspicious for invasive tumor with four month follow-up CT of the neck and soft tissues demonstrating enlargement to 1.4 x 1.1 x 0.7 cm without evidence of pathologic lymphadenopathy.  Dr. Xiao referred to Dr. Noel who performed FLV suspicious for malignancy then microlaryngoscopy noting bulky tumor extending into the left true vocal fold, crossing the anterior commissure to involve the left false vocal cord with infraglottic extent x 8 mm. Aggressive debulking revealed the tumor to have deeply invaded with pathology from left true vocal cord demonstrating moderately differentiated SCCA without lymphovascular or perineural invasion.    CT of the chest was clear.  His case was reviewed by multidisciplinary tumor board that recommended  definitive radiotherapy.  I discussed in detail with Dr. Noel who reports extensive debulking of the tumor.    Patient endorses for sense of voice that is worsening since his recent debulking.  He reports that his initial improvement but now voices weakening, also with globus sensation and dysphagia.  He denies lumps or bumps in the neck.  He denies appetite, energy or weight changes.  He denies fever, chills, chest pain, shortness of breath, hemoptysis.  He has occasional dry cough.    Review of systems otherwise negative unless indicated in HPI.    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    MICROLARYNGOSCOPY N/A 3/17/2020    Procedure: MICROLARYNGOSCOPY;  Surgeon: Jung Xiao MD;  Location: [facility] OR;  Service: ENT;  Laterality: N/A;  Laser Microlaryngoscopy  NEED TO SCHEDULE LASER from [facility] [id number]     Social History     Socioeconomic History    Marital status:      Spouse name: Janel Batres    Number of children: 2    Years of education: Not on file    Highest education level: Not on file   Occupational History    Occupation: [employer]     Employer: [employer]   Social Needs    Financial resource strain: Not on file    Food insecurity     Worry: Not on file     Inability: Not on file    Transportation needs     Medical: Not on file     Non-medical: Not on file   Tobacco Use    Smoking status: Never Smoker    Smokeless tobacco: Never Used    Substance and Sexual Activity    Alcohol use: Yes     Comment: occasional    Drug use: No    Sexual activity: Yes     Partners: Female   Lifestyle    Physical activity     Days per week: Not on file     Minutes per session: Not on file    Stress: Only a little   Relationships    Social connections     Talks on phone: Not on file     Gets together: Not on file     Attends Confucianist service: Not on file     Active member of club or organization: Not on file     Attends meetings of clubs or organizations: Not on file     Relationship status: Not on file   Other Topics Concern    Not on file   Social History Narrative    2 children from his 1st wife     Family History   Problem Relation Age of Onset    Abnormal EKG Mother     Diabetes Father     Heart disease Father     Hypertension Father        PRIOR HISTORY OF CHEMOTHERAPY OR RADIOTHERAPY: Please see HPI for patients prior oncologic history.    Medication List with Changes/Refills   Current Medications    ACETAMINOPHEN (TYLENOL) 500 MG TABLET    Take 1 tablet (500 mg total) by mouth every 6 (six) hours as needed for Pain.    ASPIRIN (ECOTRIN) 81 MG EC TABLET    Take 1 tablet by mouth Daily.    BLOOD SUGAR DIAGNOSTIC (BLOOD GLUCOSE TEST) STRP    1 strip by Misc.(Non-Drug; Combo Route) route 2 (two) times daily before meals.    CICLOPIROX (LOPROX) 0.77 % CREA    Apply topically 2 (two) times daily.    CYANOCOBALAMIN (VITAMIN B-12) 500 MCG TABLET    Take 500 mcg by mouth once daily.    ELIQUIS 5 MG TAB    Take 5 mg by mouth 2 (two) times daily.    ERGOCALCIFEROL (ERGOCALCIFEROL) 50,000 UNIT CAP    TAKE ONE CAPSULE BY MOUTH ONE TIME PER WEEK    ESOMEPRAZOLE (NEXIUM) 20 MG CAPSULE    Take 20 mg by mouth before breakfast.    FLUTICASONE (FLONASE) 50 MCG/ACTUATION NASAL SPRAY    USE 2 SPRAYS TO EACH NOSTRIL ONCE A DAY    IBUPROFEN (ADVIL,MOTRIN) 400 MG TABLET    Take 1 tablet (400 mg total) by mouth every 6 (six) hours as needed for Other (pain).    LOSARTAN  "(COZAAR) 100 MG TABLET    Take 1 tablet (100 mg total) by mouth once daily.    METFORMIN (GLUCOPHAGE-XR) 500 MG ER 24HR TABLET    TAKE 2 TABLETS TWICE DAILY WITH MEALS    METOPROLOL TARTRATE (LOPRESSOR) 25 MG TABLET    Take 1 tablet (25 mg total) by mouth 2 (two) times daily.    NATEGLINIDE (STARLIX) 120 MG TABLET    TAKE 1 TABLET (120 MG TOTAL) BY MOUTH 3 (THREE) TIMES DAILY BEFORE MEALS.    PEN NEEDLE, DIABETIC (BD ULTRA-FINE YULISSA PEN NEEDLE) 32 GAUGE X 5/32" NDLE    1 application by Misc.(Non-Drug; Combo Route) route 2 (two) times daily.    SEMAGLUTIDE (OZEMPIC) 1 MG/DOSE (2 MG/1.5 ML) PNIJ    Inject 1 mg into the skin every seven days.    SHINGRIX, PF, 50 MCG/0.5 ML INJECTION    TO BE ADMINISTERED BY PHARMACIST FOR IMMUNIZATION     Review of patient's allergies indicates:   Allergen Reactions    Pollen extracts     Lovastatin Rash     Not confirmed but pt skeptical       QUALITY OF LIFE: 90%- Able to Carry on Normal Activity: Minor Symptoms of Disease    Vitals:    08/17/20 1116   BP: (!) 149/82   Pulse: 67   Temp: 98.7 °F (37.1 °C)   SpO2: 99%   Weight: 75.8 kg (167 lb 3.2 oz)   PainSc: 0-No pain     Body mass index is 26.58 kg/m².    PHYSICAL EXAM:   GENERAL: alert; in no apparent distress.   HEAD: normocephalic, atraumatic.  EYES: pupils are equal, round, reactive to light and accommodation. Sclera anicteric. Conjunctiva not injected.   NOSE/THROAT: masked; native teeth; never smoker  NECK: no cervical motion rigidity; supple with no masses.  CHEST: Patient is speaking comfortably on room air with normal work of breathing without using accessory muscles of respiration.  ABDOMEN: soft, nontender, nondistended.  MUSCULOSKELETAL: no tenderness to palpation along the spine or scapulae. Normal range of motion.  NEUROLOGIC: cranial nerves II-XII intact bilaterally. Strength 5/5 in bilateral upper and lower extremities. No sensory deficits appreciated. Normal gait.  EXTREMITIES: no clubbing, cyanosis, " edema.  SKIN: no erythema, rashes, ulcerations noted.     REVIEW OF IMAGING/PATHOLOGY/LABS: Please see HPI. All images reviewed personally by dictating physician.     ASSESSMENT: 70 y.o. 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  PLAN:  Cyrus Batres Jr. 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  He underwent aggressive debulking with Dr. Noel and his case has been reviewed by multidisciplinary tumor board noting N0M0 on CT of the neck and chest with recommendation for definitive radiotherapy.  Prior to proceeding with treatment, I will send him for staging PET-CT scan to ensure no distant disease and assist with treatment planning. Presuming T2N0M0 disease, I agree with recommendation for definitive radiotherapy alone, using modest accelerated course to 6996cGy/33frx with 5600cGy to bilateral elective necks using IMRT with simultaneous integrated boost technique.      However, should PET-CT scan demonstrate lymph node positive disease or concern for T3+ disease, I recommend consideration of sensitizing chemotherapy in which case he will require medical oncology referral and strongly urge PEG tube placement. Will defer PEG in RT alone circumstance as this patient is a nonsmoker, motivated, with good family support. Prior to beginning radiotherapy, I recommend dental clearance and a form has been provided today.  Will also place referral to speech therapy and recommend baseline modified barium swallow study with intention for follow-up study after completion of radiotherapy.  Patient presently does have dysphagia.  Additionally, will place referral to dietitian for assistance with maintaining caloric needs.  At completion of treatment, patient will likely benefit from PT/lymphedema  therapy and/or voice therapy with Dr. Noel's clinic.      We had extensive discussion today explaining the indications for treatment as above as well as reserving further surgery/laryngectomy for the salvage circumstance.  At the end of our discussion, the patient offered several questions which I did my best to address.  He would like to proceed.    I carefully explained the process of simulation and treatment delivery with weekly physician visits.     We discussed the risks and benefits of the above treatment and have gone over in detail the acute and late toxicities of radiation therapy to the larynx and B necks. The patient expressed  understanding and has signed a consent form which is included in the patients chart.     The patient has our contact information and understands that they are free to contact us at any time with questions or concerns regarding radiation therapy.    DISPOSITION: RTC FOR CT SIM after PET/CT, DDS    I have personally seen and evaluated this patient. Greater than 50% of this time was spent discussing coordination of care and/or counseling.    COVID-19 precautions discussed. Cancer Center policy for COVID-19 testing described.  Patient will be required to wear a mask when in the Cancer Center.    PHYSICIAN: Matheus Portillo Jr, MD    Thank you for the opportunity to meet and consult with Cyrus Batres Jr..   Please feel free to contact me to discuss the above recommendation further.

## 2020-08-20 ENCOUNTER — TELEPHONE (OUTPATIENT)
Dept: RADIATION ONCOLOGY | Facility: CLINIC | Age: 70
End: 2020-08-20

## 2020-08-20 DIAGNOSIS — C32.0 MALIGNANT NEOPLASM OF TRUE VOCAL CORD: Primary | ICD-10-CM

## 2020-08-21 ENCOUNTER — TELEPHONE (OUTPATIENT)
Dept: FAMILY MEDICINE | Facility: CLINIC | Age: 70
End: 2020-08-21

## 2020-08-21 ENCOUNTER — HOSPITAL ENCOUNTER (OUTPATIENT)
Dept: RADIOLOGY | Facility: HOSPITAL | Age: 70
Discharge: HOME | End: 2020-08-21
Attending: RADIOLOGY
Payer: MEDICARE

## 2020-08-21 VITALS — BODY MASS INDEX: 25.55 KG/M2 | WEIGHT: 159 LBS | HEIGHT: 66 IN

## 2020-08-21 DIAGNOSIS — C32.0 MALIGNANT NEOPLASM OF TRUE VOCAL CORD: ICD-10-CM

## 2020-08-21 LAB — GLUCOSE SERPL-MCNC: 116 MG/DL (ref 70–110)

## 2020-08-21 PROCEDURE — A9552 F18 FDG: HCPCS | Mod: PO

## 2020-08-21 PROCEDURE — 78815 PET IMAGE W/CT SKULL-THIGH: CPT | Mod: TC,PO

## 2020-08-21 NOTE — TELEPHONE ENCOUNTER
----- Message from Cammy Huddleston sent at 8/21/2020  3:53 PM CDT -----  Contact: Concepcion with [facility]  Type: Needs Medical Advice  Who Called:  Concepcion with [facility]  Symptoms (please be specific):    How long has patient had these symptoms:    Pharmacy name and phone #:    Best Call Back Number: [phone]  Additional Information: Concepcion states that patient would like to discuss with doctor a his next visit trying a statin. Patient wanted her to leave a FYI note in patient's chart. Please call Concepcion if any questions. Thanks!

## 2020-08-31 ENCOUNTER — TELEPHONE (OUTPATIENT)
Dept: RADIATION ONCOLOGY | Facility: CLINIC | Age: 70
End: 2020-08-31

## 2020-08-31 DIAGNOSIS — Z03.818 ENCOUNTER FOR OBSERVATION FOR SUSPECTED EXPOSURE TO OTHER BIOLOGICAL AGENTS RULED OUT: ICD-10-CM

## 2020-08-31 DIAGNOSIS — C32.0 MALIGNANT NEOPLASM OF TRUE VOCAL CORD: Primary | ICD-10-CM

## 2020-09-04 ENCOUNTER — LAB VISIT (OUTPATIENT)
Dept: INFUSION THERAPY | Facility: HOSPITAL | Age: 70
End: 2020-09-04
Attending: NURSE PRACTITIONER
Payer: MEDICARE

## 2020-09-04 DIAGNOSIS — Z03.818 ENCOUNTER FOR OBSERVATION FOR SUSPECTED EXPOSURE TO OTHER BIOLOGICAL AGENTS RULED OUT: ICD-10-CM

## 2020-09-04 PROCEDURE — U0003 INFECTIOUS AGENT DETECTION BY NUCLEIC ACID (DNA OR RNA); SEVERE ACUTE RESPIRATORY SYNDROME CORONAVIRUS 2 (SARS-COV-2) (CORONAVIRUS DISEASE [COVID-19]), AMPLIFIED PROBE TECHNIQUE, MAKING USE OF HIGH THROUGHPUT TECHNOLOGIES AS DESCRIBED BY CMS-2020-01-R: HCPCS

## 2020-09-05 LAB — SARS-COV-2 RNA RESP QL NAA+PROBE: NOT DETECTED

## 2020-09-08 ENCOUNTER — CLINICAL SUPPORT (OUTPATIENT)
Dept: HEMATOLOGY/ONCOLOGY | Facility: CLINIC | Age: 70
End: 2020-09-08
Payer: MEDICARE

## 2020-09-08 ENCOUNTER — TREATMENT (OUTPATIENT)
Dept: RADIATION ONCOLOGY | Facility: CLINIC | Age: 70
End: 2020-09-08
Payer: MEDICARE

## 2020-09-08 DIAGNOSIS — C32.0 MALIGNANT NEOPLASM OF TRUE VOCAL CORD: ICD-10-CM

## 2020-09-08 PROCEDURE — 77334 RADIATION TREATMENT AID(S): CPT | Mod: S$GLB,,, | Performed by: RADIOLOGY

## 2020-09-08 PROCEDURE — 77399 PR IMAGE FUSION, RADIATION THERAPY: ICD-10-PCS | Mod: S$GLB,,, | Performed by: RADIOLOGY

## 2020-09-08 PROCEDURE — 77263 THER RADIOLOGY TX PLNG CPLX: CPT | Mod: S$GLB,,, | Performed by: RADIOLOGY

## 2020-09-08 PROCEDURE — 77399 UNLISTED PX MED RADJ PHYSICS: CPT | Mod: S$GLB,,, | Performed by: RADIOLOGY

## 2020-09-08 PROCEDURE — 77334 PR  RADN TREATMENT AID(S) COMPLX: ICD-10-PCS | Mod: S$GLB,,, | Performed by: RADIOLOGY

## 2020-09-08 PROCEDURE — 77263 PR  RADIATION THERAPY PLAN COMPLEX: ICD-10-PCS | Mod: S$GLB,,, | Performed by: RADIOLOGY

## 2020-09-09 NOTE — PROGRESS NOTES
Medical Nutrition Therapy Oncology Progress Note      Patient's PCP:Diana Calhoun MD  Referring Provider: Dr. Matheus Portillo Jr.    Recommendations/Interventions     RD Notes  Initial nutrition consult with Mr. Batres & his spouse, Janel. Pt has newly diagnosed laryngeal cancer- plan to treat with IMRT. Chemotherapy not indicated at this time per medical oncologist. He will not require a PEG tube prior to treatment. Overall, he is able to tolerate a regular diet, but endorses mild dysphagia over the past few months with dry, crunchy foods. He has not yet had baseline MBSS. He & his wife follow a plant-based diet at home. He reports that his wt fluctuates anywhere between 5-10# on a regular basis. Today he is 168.5# on rad onc scale. He states that he has intentionally been trying to put some wt on prior to starting treatment.   Hx of T2DM- currently controlled with metformin + diet.  His main concern today is re: the fluoride trays prescribed by his dentist; they are unsure of how long he should wear the trays each day (currently wearing trays all night long x 3 nights). His gums are now very sore/sensitive.   He also reports some swelling in his neck occasionally in the mornings.     Plan  1. Reviewed H&N cancer nutrition packet. Provided copy to pt.   2. Discussed potential nutrition-related side effects of radiation therapy to H&N, along with tips to combat each.   3. Discussed importance of wt maintenance & adequate fluid intake during treatment course.   4. Encouraged compliance with having MBSS completed prior to starting treatment to have baseline data.   5. Encouraged small, frequent meals q2-3 hrs as tolerated. Mechanical soft diet if needed.   6. Recommend pt call dentist to discuss proper instructions for fluoride trays.   7. RD informed Dr. Portillo's nurse, Magda, of pt's swelling in his neck.   8. Provided RD contact info. Encouraged pt & spouse to call with any  questions/concerns.     Subjective:        Patient ID: Cyrus Batres Jr. is a 70 y.o. male.    Chief Complaint: nutrition consult- new H&N    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       Past Surgical History:   Procedure Laterality Date    LARYNGOSCOPY N/A 8/4/2020    Procedure: Suspension microlaryngoscopy with biopsy, possible KTP laser treatment/excision;  Surgeon: Stew Noel MD;  Location: [address];  Service: ENT;  Laterality: N/A;  Microscope, telescopes, tower, microinstruments, KTP laser, rep conf# [id number]  7/28.    MICROLARYNGOSCOPY N/A 3/17/2020    Procedure: MICROLARYNGOSCOPY;  Surgeon: Jung Xiao MD;  Location: [facility];  Service: ENT;  Laterality: N/A;  Laser Microlaryngoscopy  NEED TO SCHEDULE LASER from [facility] [id number]       Social History     Socioeconomic History    Marital status:      Spouse name: Janel Batres    Number of children: 2    Years of education: Not on file    Highest education level: Not on file   Occupational History    Occupation: [employer]     Employer: [employer]   Social Needs    Financial resource strain: Not on file    Food insecurity     Worry: Not on file     Inability: Not on file    Transportation needs     Medical: Not on file     Non-medical: Not on file   Tobacco Use    Smoking status: Never Smoker    Smokeless tobacco: Never Used   Substance and Sexual Activity    Alcohol use: Yes     Comment: occasional    Drug use: No    Sexual activity: Yes     Partners: Female   Lifestyle    Physical activity     Days per week: Not on file     Minutes per session: Not on file    Stress: Only a little   Relationships    Social connections     Talks on phone: Not on file     Gets together: Not on file     Attends [employer] service: Not on file      Active member of club or organization: Not on file     Attends meetings of clubs or organizations: Not on file     Relationship status: Not on file   Other Topics Concern    Not on file   Social History Narrative    2 children from his 1st wife       Family History   Problem Relation Age of Onset    Abnormal EKG Mother     Diabetes Father     Heart disease Father     Hypertension Father        Review of patient's allergies indicates:   Allergen Reactions    Pollen extracts     Lovastatin Rash     Not confirmed but pt skeptical       Current Outpatient Medications:     acetaminophen (TYLENOL) 500 MG tablet, Take 1 tablet (500 mg total) by mouth every 6 (six) hours as needed for Pain., Disp: 30 tablet, Rfl: 0    aspirin (ECOTRIN) 81 MG EC tablet, Take 1 tablet by mouth Daily., Disp: , Rfl:     blood sugar diagnostic (BLOOD GLUCOSE TEST) Strp, 1 strip by Misc.(Non-Drug; Combo Route) route 2 (two) times daily before meals., Disp: 200 strip, Rfl: 2    ciclopirox (LOPROX) 0.77 % Crea, Apply topically 2 (two) times daily., Disp: 1 Tube, Rfl: 2    cyanocobalamin (VITAMIN B-12) 500 MCG tablet, Take 500 mcg by mouth once daily., Disp: , Rfl:     ELIQUIS 5 mg Tab, Take 5 mg by mouth 2 (two) times daily., Disp: , Rfl: 3    ergocalciferol (ERGOCALCIFEROL) 50,000 unit Cap, TAKE ONE CAPSULE BY MOUTH ONE TIME PER WEEK, Disp: 12 capsule, Rfl: 3    esomeprazole (NEXIUM) 20 MG capsule, Take 20 mg by mouth before breakfast., Disp: , Rfl:     fluticasone (FLONASE) 50 mcg/actuation nasal spray, USE 2 SPRAYS TO EACH NOSTRIL ONCE A DAY, Disp: 3 Bottle, Rfl: 3    ibuprofen (ADVIL,MOTRIN) 400 MG tablet, Take 1 tablet (400 mg total) by mouth every 6 (six) hours as needed for Other (pain)., Disp: 30 tablet, Rfl: 0    losartan (COZAAR) 100 MG tablet, Take 1 tablet (100 mg total) by mouth once daily., Disp: 90 tablet, Rfl: 3    metFORMIN (GLUCOPHAGE-XR) 500 MG ER 24hr tablet, TAKE 2 TABLETS TWICE DAILY WITH MEALS, Disp: 360  "tablet, Rfl: 3    metoprolol tartrate (LOPRESSOR) 25 MG tablet, Take 1 tablet (25 mg total) by mouth 2 (two) times daily., Disp: 180 tablet, Rfl: 3    nateglinide (STARLIX) 120 MG tablet, TAKE 1 TABLET (120 MG TOTAL) BY MOUTH 3 (THREE) TIMES DAILY BEFORE MEALS., Disp: 270 tablet, Rfl: 3    pen needle, diabetic (BD ULTRA-FINE YULISSA PEN NEEDLE) 32 gauge x 5/32" Ndle, 1 application by Misc.(Non-Drug; Combo Route) route 2 (two) times daily., Disp: 200 each, Rfl: 1    semaglutide (OZEMPIC) 1 mg/dose (2 mg/1.5 mL) PnIj, Inject 1 mg into the skin every seven days., Disp: 6 Syringe, Rfl: 3    SHINGRIX, PF, 50 mcg/0.5 mL injection, TO BE ADMINISTERED BY PHARMACIST FOR IMMUNIZATION, Disp: , Rfl: 1  No current facility-administered medications for this visit.     Facility-Administered Medications Ordered in Other Visits:     lactated ringers infusion, , Intravenous, Continuous, Torsten Hilton MD    All medications and past history have been reviewed.    [No treatment plan]    Objective:        Wt Readings from Last 1 Encounters:   08/21/20 1210 72.1 kg (159 lb)       Last Labs:  Glucose   Date Value Ref Range Status   07/09/2020 99 70 - 110 mg/dL Final   02/04/2020 75 70 - 110 mg/dL Final     BUN, Bld   Date Value Ref Range Status   07/09/2020 19 8 - 23 mg/dL Final   02/04/2020 11 8 - 23 mg/dL Final     Creatinine   Date Value Ref Range Status   09/04/2020 1.2 0.5 - 1.4 mg/dL Final   07/09/2020 1.4 0.5 - 1.4 mg/dL Final     Sodium   Date Value Ref Range Status   07/09/2020 140 136 - 145 mmol/L Final   02/04/2020 141 136 - 145 mmol/L Final     Potassium   Date Value Ref Range Status   07/09/2020 3.9 3.5 - 5.1 mmol/L Final   02/04/2020 4.7 3.5 - 5.1 mmol/L Final     Phosphorus   Date Value Ref Range Status   07/09/2020 3.2 2.7 - 4.5 mg/dL Final   11/04/2019 2.8 2.7 - 4.5 mg/dL Final     Calcium   Date Value Ref Range Status   07/09/2020 10.4 8.7 - 10.5 mg/dL Final   02/04/2020 9.6 8.7 - 10.5 mg/dL Final     No results " found for: PREALBUMIN  Total Protein   Date Value Ref Range Status   02/04/2020 7.2 6.0 - 8.4 g/dL Final   07/02/2019 7.3 6.0 - 8.4 g/dL Final     Cholesterol   Date Value Ref Range Status   02/04/2020 181 120 - 199 mg/dL Final     Comment:     The National Cholesterol Education Program (NCEP) has set the  following guidelines (reference ranges) for Cholesterol:  Optimal.....................<200 mg/dL  Borderline High.............200-239 mg/dL  High........................> or = 240 mg/dL     07/02/2019 193 120 - 199 mg/dL Final     Comment:     The National Cholesterol Education Program (NCEP) has set the  following guidelines (reference ranges) for Cholesterol:  Optimal.....................<200 mg/dL  Borderline High.............200-239 mg/dL  High........................> or = 240 mg/dL       Hemoglobin A1C   Date Value Ref Range Status   07/09/2020 5.4 4.0 - 5.6 % Final     Comment:     ADA Screening Guidelines:  5.7-6.4%  Consistent with prediabetes  >or=6.5%  Consistent with diabetes  High levels of fetal hemoglobin interfere with the HbA1C  assay. Heterozygous hemoglobin variants (HbS, HgC, etc)do  not significantly interfere with this assay.   However, presence of multiple variants may affect accuracy.     02/04/2020 5.8 (H) 4.0 - 5.6 % Final     Comment:     ADA Screening Guidelines:  5.7-6.4%  Consistent with prediabetes  >or=6.5%  Consistent with diabetes  High levels of fetal hemoglobin interfere with the HbA1C  assay. Heterozygous hemoglobin variants (HbS, HgC, etc)do  not significantly interfere with this assay.   However, presence of multiple variants may affect accuracy.       Hemoglobin   Date Value Ref Range Status   07/02/2019 14.3 14.0 - 18.0 g/dL Final     Hematocrit   Date Value Ref Range Status   07/02/2019 44.2 40.0 - 54.0 % Final     No results found for: IRON  No components found for: FROLATE  Vit D, 25-Hydroxy   Date Value Ref Range Status   11/04/2019 26 (L) 30 - 96 ng/mL Final      Comment:     Vitamin D deficiency.........<10 ng/mL                              Vitamin D insufficiency......10-29 ng/mL       Vitamin D sufficiency........> or equal to 30 ng/mL  Vitamin D toxicity............>100 ng/mL     07/02/2019 13 (L) 30 - 96 ng/mL Final     Comment:     Vitamin D deficiency.........<10 ng/mL                              Vitamin D insufficiency......10-29 ng/mL       Vitamin D sufficiency........> or equal to 30 ng/mL  Vitamin D toxicity............>100 ng/mL       WBC   Date Value Ref Range Status   07/02/2019 7.61 3.90 - 12.70 K/uL Final       Assessment:     Nutrition/Diet History     Patient Reported Diet/Restrictions/Preferences: regular diet; plant-based  Food Allergies: NKFA  Factors Affecting Nutritional Intake: mild dysphagia with certain foods    Estimated/Assessed Needs     Weight Used For Calorie Calculations: 76.6 kg (168.5 lb)  Energy Calorie Requirements (kcal): 4962-3481 kcal/day   Energy Need Method: 25-30 Kcal/kg  Protein Requirements:  g/day   Protein Need Method: 1.2-1.5 g/kg  Fluid Requirements: 2000 ml/day  Estimated Fluid Requirement Method: 1ml/kcal      Nutrition Support  PEG tube not indicated for planned radiation therapy.     Evaluation of Received Nutrient/Fluid Intake     Calorie Intake: meeting needs  Protein Intake: meeting needs  Fluid Intake: meeting needs  Tolerance: tolerating  % Intake of Estimated Energy Needs:  %      Nutrition Diagnosis Related to (Etiology) As Evidenced By (Signs/Symptoms)   Swallowing difficulty Laryngeal cancer Mild dysphagia with dry, crunchy foods       RD Notes  Initial nutrition consult with Mr. Batres & his spouse, Janel. Pt has newly diagnosed laryngeal cancer- plan to treat with IMRT. Chemotherapy not indicated at this time per medical oncologist. He will not require a PEG tube prior to treatment. Overall, he is able to tolerate a regular diet, but endorses mild dysphagia over the past few months with dry, crunchy  foods. He has not yet had baseline MBSS. He & his wife follow a plant-based diet at home. He reports that his wt fluctuates anywhere between 5-10# on a regular basis. Today he is 168.5# on rad onc scale. He states that he has intentionally been trying to put some wt on prior to starting treatment.   Hx of T2DM- currently controlled with metformin + diet.   His main concern today is re: the fluoride trays prescribed by his dentist; they are unsure of how long he should wear the trays each day (currently wearing trays all night long x 3 nights). His gums are now very sore/sensitive.   He also reports some swelling in his neck occasionally in the mornings.     Nutrition Intervention:      Nutrition Intervention Texture-modified diet   Goals/Expected Outcomes Mechanical soft diet as tolerated to meet estimated energy needs.    Progress Initial     Plan  1. Reviewed H&N cancer nutrition packet. Provided copy to pt.   2. Discussed potential nutrition-related side effects of radiation therapy to H&N, along with tips to combat each.   3. Discussed importance of wt maintenance & adequate fluid intake during treatment course.   4. Encouraged compliance with having MBSS completed prior to starting treatment to have baseline data.   5. Encouraged small, frequent meals q2-3 hrs as tolerated. Mechanical soft diet if needed.   6. Recommend pt call dentist to discuss proper instructions for fluoride trays.   7. RD informed Dr. Portillo's nurse, Magda, of pt's swelling in his neck.   8. Provided RD contact info. Encouraged pt & spouse to call with any questions/concerns.     Monitoring/Evaluation:     Monitor: wt, p.o. intake    Next Visit: Will f/u at start of treatment.       I have explained and the patient understands all of  the current recommendation(s). I have answered all of their questions to the best of my ability and to their complete satisfaction.   The patient is to continue with the current management  plan.    Electronically signed by: Marcie Ruiz MS, RDN, LDN

## 2020-09-10 PROCEDURE — 77300 PR RADIATION THERAPY,DOSIMETRY PLAN: ICD-10-PCS | Mod: S$GLB,,, | Performed by: RADIOLOGY

## 2020-09-10 PROCEDURE — 77301 RADIOTHERAPY DOSE PLAN IMRT: CPT | Mod: S$GLB,,, | Performed by: RADIOLOGY

## 2020-09-10 PROCEDURE — 77301 PR  INTEN MOD RADIOTHER PLAN W/DOSE VOL HIST: ICD-10-PCS | Mod: S$GLB,,, | Performed by: RADIOLOGY

## 2020-09-10 PROCEDURE — 77300 RADIATION THERAPY DOSE PLAN: CPT | Mod: S$GLB,,, | Performed by: RADIOLOGY

## 2020-09-10 PROCEDURE — 77338 PR  MLC IMRT DESIGN & CONSTRUCTION PER IMRT PLAN: ICD-10-PCS | Mod: S$GLB,,, | Performed by: RADIOLOGY

## 2020-09-10 PROCEDURE — 77338 DESIGN MLC DEVICE FOR IMRT: CPT | Mod: S$GLB,,, | Performed by: RADIOLOGY

## 2020-09-17 ENCOUNTER — PATIENT MESSAGE (OUTPATIENT)
Dept: ENDOCRINOLOGY | Facility: CLINIC | Age: 70
End: 2020-09-17

## 2020-09-17 ENCOUNTER — TELEPHONE (OUTPATIENT)
Dept: PHARMACY | Facility: AMBULARY SURGERY CENTER | Age: 70
End: 2020-09-17

## 2020-09-18 RX ORDER — INSULIN GLARGINE 100 [IU]/ML
INJECTION, SOLUTION SUBCUTANEOUS
Qty: 15 ML | Refills: 3 | Status: SHIPPED | OUTPATIENT
Start: 2020-09-18 | End: 2021-01-04

## 2020-09-19 ENCOUNTER — PATIENT MESSAGE (OUTPATIENT)
Dept: ENDOCRINOLOGY | Facility: CLINIC | Age: 70
End: 2020-09-19

## 2020-09-24 ENCOUNTER — DOCUMENTATION ONLY (OUTPATIENT)
Dept: HEMATOLOGY/ONCOLOGY | Facility: CLINIC | Age: 70
End: 2020-09-24

## 2020-09-24 NOTE — PROGRESS NOTES
Medical Nutrition Therapy Oncology Progress Note      Patient's PCP:Diana Calhoun MD  Referring Provider: No ref. provider found    Recommendations/Interventions     RD Notes  F/u with Mr. Batres today prior to his radiation therapy appt. He is now finishing up with his second week of treatment. Hoarse voice. He denies odynophagia/dysphagia, changes in p.o. intake, or wt changes. Wt stable- he is up 1# from his starting wt (CW: 169.4#). He is using baking soda/salt rinses prn.     Plan  1. Continue with aggressive calorie/fluid intake during remainder of treatment.   2. Oral rinses prn.   3. Pt has RD contact info; encouraged him to call with any questions/concerns.     Subjective:        Patient ID: Cyrus Batres Jr. is a 70 y.o. male.    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    MICROLARYNGOSCOPY N/A 3/17/2020    Procedure: MICROLARYNGOSCOPY;  Surgeon: Jung Xiao MD;  [facility];  Service: ENT;  Laterality: N/A;  Laser Microlaryngoscopy  NEED TO SCHEDULE LASER from [facility] [id number]       Social History     Socioeconomic History    Marital status:      Spouse name: Janel Batres    Number of children: 2    Years of education: Not on file    Highest education level: Not on file   Occupational History    Occupation: [employer]     Employer: [employer]   Social Needs    Financial  resource strain: Not on file    Food insecurity     Worry: Not on file     Inability: Not on file    Transportation needs     Medical: Not on file     Non-medical: Not on file   Tobacco Use    Smoking status: Never Smoker    Smokeless tobacco: Never Used   Substance and Sexual Activity    Alcohol use: Yes     Comment: occasional    Drug use: No    Sexual activity: Yes     Partners: Female   Lifestyle    Physical activity     Days per week: Not on file     Minutes per session: Not on file    Stress: Only a little   Relationships    Social connections     Talks on phone: Not on file     Gets together: Not on file     Attends Evangelical service: Not on file     Active member of club or organization: Not on file     Attends meetings of clubs or organizations: Not on file     Relationship status: Not on file   Other Topics Concern    Not on file   Social History Narrative    2 children from his 1st wife       Family History   Problem Relation Age of Onset    Abnormal EKG Mother     Diabetes Father     Heart disease Father     Hypertension Father        Review of patient's allergies indicates:   Allergen Reactions    Pollen extracts     Lovastatin Rash     Not confirmed but pt skeptical       Current Outpatient Medications:     acetaminophen (TYLENOL) 500 MG tablet, Take 1 tablet (500 mg total) by mouth every 6 (six) hours as needed for Pain., Disp: 30 tablet, Rfl: 0    aspirin (ECOTRIN) 81 MG EC tablet, Take 1 tablet by mouth Daily., Disp: , Rfl:     blood sugar diagnostic (BLOOD GLUCOSE TEST) Strp, 1 strip by Misc.(Non-Drug; Combo Route) route 2 (two) times daily before meals., Disp: 200 strip, Rfl: 2    ciclopirox (LOPROX) 0.77 % Crea, Apply topically 2 (two) times daily., Disp: 1 Tube, Rfl: 2    cyanocobalamin (VITAMIN B-12) 500 MCG tablet, Take 500 mcg by mouth once daily., Disp: , Rfl:     ELIQUIS 5 mg Tab, Take 5 mg by mouth 2 (two) times daily., Disp: , Rfl: 3    ergocalciferol  "(ERGOCALCIFEROL) 50,000 unit Cap, TAKE ONE CAPSULE BY MOUTH ONE TIME PER WEEK, Disp: 12 capsule, Rfl: 3    esomeprazole (NEXIUM) 20 MG capsule, Take 20 mg by mouth before breakfast., Disp: , Rfl:     fluticasone (FLONASE) 50 mcg/actuation nasal spray, USE 2 SPRAYS TO EACH NOSTRIL ONCE A DAY, Disp: 3 Bottle, Rfl: 3    ibuprofen (ADVIL,MOTRIN) 400 MG tablet, Take 1 tablet (400 mg total) by mouth every 6 (six) hours as needed for Other (pain)., Disp: 30 tablet, Rfl: 0    insulin (LANTUS SOLOSTAR U-100 INSULIN) glargine 100 units/mL (3mL) SubQ pen, Inject 10 units nightly. Increase by 2 units every other day if glucose is still >200 in the morning., Disp: 15 mL, Rfl: 3    losartan (COZAAR) 100 MG tablet, Take 1 tablet (100 mg total) by mouth once daily., Disp: 90 tablet, Rfl: 3    metFORMIN (GLUCOPHAGE-XR) 500 MG ER 24hr tablet, TAKE 2 TABLETS TWICE DAILY WITH MEALS, Disp: 360 tablet, Rfl: 3    metoprolol tartrate (LOPRESSOR) 25 MG tablet, Take 1 tablet (25 mg total) by mouth 2 (two) times daily., Disp: 180 tablet, Rfl: 3    nateglinide (STARLIX) 120 MG tablet, TAKE 1 TABLET (120 MG TOTAL) BY MOUTH 3 (THREE) TIMES DAILY BEFORE MEALS., Disp: 270 tablet, Rfl: 3    pen needle, diabetic (BD ULTRA-FINE YULISSA PEN NEEDLE) 32 gauge x 5/32" Ndle, 1 application by Misc.(Non-Drug; Combo Route) route 2 (two) times daily., Disp: 200 each, Rfl: 1    semaglutide (OZEMPIC) 1 mg/dose (2 mg/1.5 mL) PnIj, Inject 1 mg into the skin every seven days., Disp: 6 Syringe, Rfl: 3    SHINGRIX, PF, 50 mcg/0.5 mL injection, TO BE ADMINISTERED BY PHARMACIST FOR IMMUNIZATION, Disp: , Rfl: 1  No current facility-administered medications for this visit.     Facility-Administered Medications Ordered in Other Visits:     lactated ringers infusion, , Intravenous, Continuous, Torsten Hilton MD    All medications and past history have been reviewed.    [No treatment plan]    Objective:        Wt Readings from Last 1 Encounters:   08/21/20 1210 " 72.1 kg (159 lb)       Last Labs:  Glucose   Date Value Ref Range Status   07/09/2020 99 70 - 110 mg/dL Final   02/04/2020 75 70 - 110 mg/dL Final     BUN, Bld   Date Value Ref Range Status   07/09/2020 19 8 - 23 mg/dL Final   02/04/2020 11 8 - 23 mg/dL Final     Creatinine   Date Value Ref Range Status   09/04/2020 1.2 0.5 - 1.4 mg/dL Final   07/09/2020 1.4 0.5 - 1.4 mg/dL Final     Sodium   Date Value Ref Range Status   07/09/2020 140 136 - 145 mmol/L Final   02/04/2020 141 136 - 145 mmol/L Final     Potassium   Date Value Ref Range Status   07/09/2020 3.9 3.5 - 5.1 mmol/L Final   02/04/2020 4.7 3.5 - 5.1 mmol/L Final     Phosphorus   Date Value Ref Range Status   07/09/2020 3.2 2.7 - 4.5 mg/dL Final   11/04/2019 2.8 2.7 - 4.5 mg/dL Final     Calcium   Date Value Ref Range Status   07/09/2020 10.4 8.7 - 10.5 mg/dL Final   02/04/2020 9.6 8.7 - 10.5 mg/dL Final     No results found for: PREALBUMIN  Total Protein   Date Value Ref Range Status   02/04/2020 7.2 6.0 - 8.4 g/dL Final   07/02/2019 7.3 6.0 - 8.4 g/dL Final     Cholesterol   Date Value Ref Range Status   02/04/2020 181 120 - 199 mg/dL Final     Comment:     The National Cholesterol Education Program (NCEP) has set the  following guidelines (reference ranges) for Cholesterol:  Optimal.....................<200 mg/dL  Borderline High.............200-239 mg/dL  High........................> or = 240 mg/dL     07/02/2019 193 120 - 199 mg/dL Final     Comment:     The National Cholesterol Education Program (NCEP) has set the  following guidelines (reference ranges) for Cholesterol:  Optimal.....................<200 mg/dL  Borderline High.............200-239 mg/dL  High........................> or = 240 mg/dL       Hemoglobin A1C   Date Value Ref Range Status   07/09/2020 5.4 4.0 - 5.6 % Final     Comment:     ADA Screening Guidelines:  5.7-6.4%  Consistent with prediabetes  >or=6.5%  Consistent with diabetes  High levels of fetal hemoglobin interfere with the  HbA1C  assay. Heterozygous hemoglobin variants (HbS, HgC, etc)do  not significantly interfere with this assay.   However, presence of multiple variants may affect accuracy.     02/04/2020 5.8 (H) 4.0 - 5.6 % Final     Comment:     ADA Screening Guidelines:  5.7-6.4%  Consistent with prediabetes  >or=6.5%  Consistent with diabetes  High levels of fetal hemoglobin interfere with the HbA1C  assay. Heterozygous hemoglobin variants (HbS, HgC, etc)do  not significantly interfere with this assay.   However, presence of multiple variants may affect accuracy.       Hemoglobin   Date Value Ref Range Status   07/02/2019 14.3 14.0 - 18.0 g/dL Final     Hematocrit   Date Value Ref Range Status   07/02/2019 44.2 40.0 - 54.0 % Final     No results found for: IRON  No components found for: FROLATE  Vit D, 25-Hydroxy   Date Value Ref Range Status   11/04/2019 26 (L) 30 - 96 ng/mL Final     Comment:     Vitamin D deficiency.........<10 ng/mL                              Vitamin D insufficiency......10-29 ng/mL       Vitamin D sufficiency........> or equal to 30 ng/mL  Vitamin D toxicity............>100 ng/mL     07/02/2019 13 (L) 30 - 96 ng/mL Final     Comment:     Vitamin D deficiency.........<10 ng/mL                              Vitamin D insufficiency......10-29 ng/mL       Vitamin D sufficiency........> or equal to 30 ng/mL  Vitamin D toxicity............>100 ng/mL       WBC   Date Value Ref Range Status   07/02/2019 7.61 3.90 - 12.70 K/uL Final       Assessment:     Nutrition/Diet History     Patient Reported Diet/Restrictions/Preferences: regular diet   Food Allergies: NKFA  Factors Affecting Nutritional Intake: none    Estimated/Assessed Needs     Weight Used For Calorie Calculations: 76.6 kg (168.5 lb)  Energy Calorie Requirements (kcal): 5902-1067 kcal/day   Energy Need Method: 25-30 Kcal/kg  Protein Requirements:  g/day   Protein Need Method: 1.2-1.5 g/kg  Fluid Requirements: 2000 ml/day  Estimated Fluid Requirement  Method: 1ml/kcal      Nutrition Support  N/A    Evaluation of Received Nutrient/Fluid Intake     Calorie Intake: meeting needs  Protein Intake: meeting needs  Fluid Intake: meeting needs  Tolerance: tolerating  % Intake of Estimated Energy Needs: 100 %      Nutrition Diagnosis Related to (Etiology) As Evidenced By (Signs/Symptoms)   No nutrition diagnosis at this time         RD Notes  F/u with Mr. Batres today prior to his radiation therapy appt. He is now finishing up with his second week of treatment. Hoarse voice. He denies odynophagia/dysphagia, changes in p.o. intake, or wt changes. Wt stable- he is up 1# from his starting wt (CW: 169.4#). He is using baking soda/salt rinses prn.     Nutrition Intervention:      Nutrition Intervention Texture-modified diet   Goals/Expected Outcomes Transition to mechanical soft diet as needed to meet estimated energy needs.    Progress Progressing towards goal       Plan  1. Continue with aggressive calorie/fluid intake during remainder of treatment.   2. Oral rinses prn.   3. Pt has RD contact info; encouraged him to call with any questions/concerns.     Monitoring/Evaluation:     Monitor: wt, p.o. intake    Next Visit: Will f/u in 1 week.       I have explained and the patient understands all of  the current recommendation(s). I have answered all of their questions to the best of my ability and to their complete satisfaction.   The patient is to continue with the current management plan.    Electronically signed by: Marcie Ruiz MS, RDN, LDN

## 2020-09-29 ENCOUNTER — TREATMENT (OUTPATIENT)
Dept: RADIATION ONCOLOGY | Facility: CLINIC | Age: 70
End: 2020-09-29
Payer: MEDICARE

## 2020-09-29 PROCEDURE — 77014 PR  CT GUIDANCE PLACEMENT RAD THERAPY FIELDS: ICD-10-PCS | Mod: S$GLB,,, | Performed by: RADIOLOGY

## 2020-09-29 PROCEDURE — G6015 RADIATION TX DELIVERY IMRT: HCPCS | Mod: S$GLB,,, | Performed by: RADIOLOGY

## 2020-09-29 PROCEDURE — 77336 RADIATION PHYSICS CONSULT: CPT | Mod: S$GLB,,, | Performed by: RADIOLOGY

## 2020-09-29 PROCEDURE — G6015 PR RADN TX DELIVERY,  INTENS MOD, 1+ FIELDS PER TX: ICD-10-PCS | Mod: S$GLB,,, | Performed by: RADIOLOGY

## 2020-09-29 PROCEDURE — 77336 PR  RADN PHYSICS CONSULT CONTINUING: ICD-10-PCS | Mod: S$GLB,,, | Performed by: RADIOLOGY

## 2020-09-29 PROCEDURE — 77014 PR  CT GUIDANCE PLACEMENT RAD THERAPY FIELDS: CPT | Mod: S$GLB,,, | Performed by: RADIOLOGY

## 2020-09-30 PROCEDURE — 77427 PR CHG RADIATION,MANGEMENT,5 TX'S: ICD-10-PCS | Mod: S$GLB,,, | Performed by: RADIOLOGY

## 2020-09-30 PROCEDURE — 77427 RADIATION TX MANAGEMENT X5: CPT | Mod: S$GLB,,, | Performed by: RADIOLOGY

## 2020-10-02 ENCOUNTER — LAB VISIT (OUTPATIENT)
Dept: LAB | Facility: HOSPITAL | Age: 70
End: 2020-10-02
Attending: FAMILY MEDICINE
Payer: MEDICARE

## 2020-10-02 DIAGNOSIS — Z79.4 TYPE 2 DIABETES MELLITUS WITH HYPERGLYCEMIA, WITH LONG-TERM CURRENT USE OF INSULIN: ICD-10-CM

## 2020-10-02 DIAGNOSIS — E11.65 TYPE 2 DIABETES MELLITUS WITH HYPERGLYCEMIA, WITH LONG-TERM CURRENT USE OF INSULIN: ICD-10-CM

## 2020-10-02 LAB
ALBUMIN SERPL BCP-MCNC: 4.1 G/DL (ref 3.5–5.2)
ANION GAP SERPL CALC-SCNC: 10 MMOL/L (ref 8–16)
BUN SERPL-MCNC: 21 MG/DL (ref 8–23)
CALCIUM SERPL-MCNC: 9.2 MG/DL (ref 8.7–10.5)
CHLORIDE SERPL-SCNC: 107 MMOL/L (ref 95–110)
CO2 SERPL-SCNC: 23 MMOL/L (ref 23–29)
CREAT SERPL-MCNC: 1.4 MG/DL (ref 0.5–1.4)
EST. GFR  (AFRICAN AMERICAN): 58.8 ML/MIN/1.73 M^2
EST. GFR  (NON AFRICAN AMERICAN): 50.9 ML/MIN/1.73 M^2
GLUCOSE SERPL-MCNC: 133 MG/DL (ref 70–110)
PHOSPHATE SERPL-MCNC: 3.4 MG/DL (ref 2.7–4.5)
POTASSIUM SERPL-SCNC: 5.1 MMOL/L (ref 3.5–5.1)
SODIUM SERPL-SCNC: 140 MMOL/L (ref 136–145)

## 2020-10-02 PROCEDURE — 80069 RENAL FUNCTION PANEL: CPT

## 2020-10-02 PROCEDURE — 36415 COLL VENOUS BLD VENIPUNCTURE: CPT | Mod: PO

## 2020-10-05 ENCOUNTER — PATIENT MESSAGE (OUTPATIENT)
Dept: ADMINISTRATIVE | Facility: HOSPITAL | Age: 70
End: 2020-10-05

## 2020-10-05 ENCOUNTER — DOCUMENTATION ONLY (OUTPATIENT)
Dept: RADIATION ONCOLOGY | Facility: CLINIC | Age: 70
End: 2020-10-05

## 2020-10-05 DIAGNOSIS — R13.12 OROPHARYNGEAL DYSPHAGIA: ICD-10-CM

## 2020-10-05 DIAGNOSIS — C32.0 MALIGNANT NEOPLASM OF TRUE VOCAL CORD: Primary | ICD-10-CM

## 2020-10-05 RX ORDER — HYDROCODONE BITARTRATE AND ACETAMINOPHEN 7.5; 325 MG/15ML; MG/15ML
15 SOLUTION ORAL
Qty: 750 ML | Refills: 0 | Status: SHIPPED | OUTPATIENT
Start: 2020-10-05 | End: 2020-10-12 | Stop reason: SDUPTHER

## 2020-10-05 NOTE — PROGRESS NOTES
Shannan at [facility] called stating that we prescribed 750ml hycet for Mr. Batres. The pharmacy only has 500ml remaining. Will fill 500ml for patient. Remaining amount is voided.

## 2020-10-06 ENCOUNTER — TREATMENT (OUTPATIENT)
Dept: RADIATION ONCOLOGY | Facility: CLINIC | Age: 70
End: 2020-10-06
Payer: MEDICARE

## 2020-10-06 ENCOUNTER — CLINICAL SUPPORT (OUTPATIENT)
Dept: HEMATOLOGY/ONCOLOGY | Facility: CLINIC | Age: 70
End: 2020-10-06
Payer: MEDICARE

## 2020-10-06 ENCOUNTER — PATIENT OUTREACH (OUTPATIENT)
Dept: ADMINISTRATIVE | Facility: OTHER | Age: 70
End: 2020-10-06

## 2020-10-06 DIAGNOSIS — Z12.11 ENCOUNTER FOR FIT (FECAL IMMUNOCHEMICAL TEST) SCREENING: Primary | ICD-10-CM

## 2020-10-06 PROCEDURE — G6015 PR RADN TX DELIVERY,  INTENS MOD, 1+ FIELDS PER TX: ICD-10-PCS | Mod: S$GLB,,, | Performed by: RADIOLOGY

## 2020-10-06 PROCEDURE — 77336 RADIATION PHYSICS CONSULT: CPT | Mod: S$GLB,,, | Performed by: RADIOLOGY

## 2020-10-06 PROCEDURE — 77336 PR  RADN PHYSICS CONSULT CONTINUING: ICD-10-PCS | Mod: S$GLB,,, | Performed by: RADIOLOGY

## 2020-10-06 PROCEDURE — 77014 PR  CT GUIDANCE PLACEMENT RAD THERAPY FIELDS: CPT | Mod: S$GLB,,, | Performed by: RADIOLOGY

## 2020-10-06 PROCEDURE — 77014 PR  CT GUIDANCE PLACEMENT RAD THERAPY FIELDS: ICD-10-PCS | Mod: S$GLB,,, | Performed by: RADIOLOGY

## 2020-10-06 PROCEDURE — G6015 RADIATION TX DELIVERY IMRT: HCPCS | Mod: S$GLB,,, | Performed by: RADIOLOGY

## 2020-10-06 NOTE — PROGRESS NOTES
Chart was reviewed for overdue Proactive [facility] Encounters ([facility])  topics  Updates were requested from care everywhere  Health Maintenance has been updated  [place] immunization registry triggered  Immunizations were reconciled

## 2020-10-06 NOTE — PROGRESS NOTES
Medical Nutrition Therapy Oncology Progress Note      Patient's PCP:Diana Calhoun MD  Referring Provider: No ref. provider found    Recommendations/Interventions     RD Notes  F/u with Mr. Batres this morning prior to his radiation therapy appt. Pt's wife had called [facility] expressing concerns about pt's complaints of thick mucus that has been keeping him awake at night. During discussion with pt this morning, he confirmed issues with thick mucus, despite rinsing with baking soda/salt & drinking fluids. He also endorses increased pain in his throat that does not seem to be alleviated with Hycet. He also has magic mouthwash, which he reports burns so badly that he is unsure of whether or not he wants to continue using it. Despite odynophagia, pt has been able to maintain his wt thus far. CW: 168.8#    Plan  1. Soft/liquid diet q2-3 hrs as tolerated to meet energy needs.   2. Supplement with ONS BID- recommend Glucerna shakes for BG control.   3. Recommend liquid mucinex + increased fluid intake for thick mucus. This info was relayed to pt's wife via telephone call.   4. Advised pt to f/u with Dr. Portillo today re: pain mgmt.  5. Pt has RD contact info. Encouraged him to call with any questions/concerns.     Subjective:        Patient ID: Cyrus Batres Jr. is a 70 y.o. male.    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     MICROLARYNGOSCOPY N/A 3/17/2020    Procedure: MICROLARYNGOSCOPY;  Surgeon: Jung Xiao MD;  Location: [facility] OR;  Service: ENT;  Laterality: N/A;  Laser Microlaryngoscopy  NEED TO SCHEDULE LASER from [facility] [phone]       Social History     Socioeconomic History    Marital status:      Spouse name: Janel Batres    Number of children: 2    Years of education: Not on file    Highest education level: Not on file   Occupational History    Occupation: [employer]     Employer: [employer]   Social Needs    Financial resource strain: Not on file    Food insecurity     Worry: Not on file     Inability: Not on file    Transportation needs     Medical: Not on file     Non-medical: Not on file   Tobacco Use    Smoking status: Never Smoker    Smokeless tobacco: Never Used   Substance and Sexual Activity    Alcohol use: Yes     Comment: occasional    Drug use: No    Sexual activity: Yes     Partners: Female   Lifestyle    Physical activity     Days per week: Not on file     Minutes per session: Not on file    Stress: Only a little   Relationships    Social connections     Talks on phone: Not on file     Gets together: Not on file     Attends Restorationist service: Not on file     Active member of club or organization: Not on file     Attends meetings of clubs or organizations: Not on file     Relationship status: Not on file   Other Topics Concern    Not on file   Social History Narrative    2 children from his 1st wife       Family History   Problem Relation Age of Onset    Abnormal EKG Mother     Diabetes Father     Heart disease Father     Hypertension Father        Review of patient's allergies indicates:   Allergen Reactions    Pollen extracts     Lovastatin Rash     Not confirmed but pt skeptical       Current Outpatient Medications:     acetaminophen (TYLENOL) 500 MG tablet, Take 1 tablet (500 mg total) by mouth every 6 (six) hours as needed for Pain., Disp: 30 tablet, Rfl:  0    aspirin (ECOTRIN) 81 MG EC tablet, Take 1 tablet by mouth Daily., Disp: , Rfl:     blood sugar diagnostic (BLOOD GLUCOSE TEST) Strp, 1 strip by Misc.(Non-Drug; Combo Route) route 2 (two) times daily before meals., Disp: 200 strip, Rfl: 2    ciclopirox (LOPROX) 0.77 % Crea, Apply topically 2 (two) times daily., Disp: 1 Tube, Rfl: 2    cyanocobalamin (VITAMIN B-12) 500 MCG tablet, Take 500 mcg by mouth once daily., Disp: , Rfl:     duke's soln (benadryl 30 mL, mylanta 30 mL, lidocaine 30 mL, nystatin 30 mL) 120mL, Take 10 mLs by mouth 4 (four) times daily., Disp: 120 mL, Rfl: 0    ELIQUIS 5 mg Tab, TAKE 1 TABLET TWICE DAILY, Disp: 180 tablet, Rfl: 2    ergocalciferol (ERGOCALCIFEROL) 50,000 unit Cap, TAKE ONE CAPSULE BY MOUTH ONE TIME PER WEEK, Disp: 12 capsule, Rfl: 3    esomeprazole (NEXIUM) 20 MG capsule, Take 20 mg by mouth before breakfast., Disp: , Rfl:     fluticasone (FLONASE) 50 mcg/actuation nasal spray, USE 2 SPRAYS TO EACH NOSTRIL ONCE A DAY, Disp: 3 Bottle, Rfl: 3    hydrocodone-acetaminophen (HYCET) solution 7.5-325 mg/15mL, Take 15 mLs by mouth every 4 to 6 hours as needed for Pain., Disp: 750 mL, Rfl: 0    ibuprofen (ADVIL,MOTRIN) 400 MG tablet, Take 1 tablet (400 mg total) by mouth every 6 (six) hours as needed for Other (pain)., Disp: 30 tablet, Rfl: 0    insulin (LANTUS SOLOSTAR U-100 INSULIN) glargine 100 units/mL (3mL) SubQ pen, Inject 10 units nightly. Increase by 2 units every other day if glucose is still >200 in the morning., Disp: 15 mL, Rfl: 3    losartan (COZAAR) 100 MG tablet, Take 1 tablet (100 mg total) by mouth once daily., Disp: 90 tablet, Rfl: 3    metFORMIN (GLUCOPHAGE-XR) 500 MG ER 24hr tablet, TAKE 2 TABLETS TWICE DAILY WITH MEALS, Disp: 360 tablet, Rfl: 3    metoprolol tartrate (LOPRESSOR) 25 MG tablet, Take 1 tablet (25 mg total) by mouth 2 (two) times daily., Disp: 180 tablet, Rfl: 3    nateglinide (STARLIX) 120 MG tablet, TAKE 1 TABLET (120 MG TOTAL) BY  "MOUTH 3 (THREE) TIMES DAILY BEFORE MEALS., Disp: 270 tablet, Rfl: 3    pen needle, diabetic (BD ULTRA-FINE YULISSA PEN NEEDLE) 32 gauge x 5/32" Ndle, 1 application by Misc.(Non-Drug; Combo Route) route 2 (two) times daily., Disp: 200 each, Rfl: 1    semaglutide (OZEMPIC) 1 mg/dose (2 mg/1.5 mL) PnIj, Inject 1 mg into the skin every seven days., Disp: 6 Syringe, Rfl: 3    SHINGRIX, PF, 50 mcg/0.5 mL injection, TO BE ADMINISTERED BY PHARMACIST FOR IMMUNIZATION, Disp: , Rfl: 1  No current facility-administered medications for this visit.     Facility-Administered Medications Ordered in Other Visits:     lactated ringers infusion, , Intravenous, Continuous, Torsten Hilton MD    All medications and past history have been reviewed.    [No treatment plan]    Objective:        Wt Readings from Last 1 Encounters:   08/21/20 1210 72.1 kg (159 lb)       Last Labs:  Glucose   Date Value Ref Range Status   10/02/2020 133 (H) 70 - 110 mg/dL Final   07/09/2020 99 70 - 110 mg/dL Final     BUN, Bld   Date Value Ref Range Status   10/02/2020 21 8 - 23 mg/dL Final   07/09/2020 19 8 - 23 mg/dL Final     Creatinine   Date Value Ref Range Status   10/02/2020 1.4 0.5 - 1.4 mg/dL Final   09/04/2020 1.2 0.5 - 1.4 mg/dL Final     Sodium   Date Value Ref Range Status   10/02/2020 140 136 - 145 mmol/L Final   07/09/2020 140 136 - 145 mmol/L Final     Potassium   Date Value Ref Range Status   10/02/2020 5.1 3.5 - 5.1 mmol/L Final   07/09/2020 3.9 3.5 - 5.1 mmol/L Final     Phosphorus   Date Value Ref Range Status   10/02/2020 3.4 2.7 - 4.5 mg/dL Final   07/09/2020 3.2 2.7 - 4.5 mg/dL Final     Calcium   Date Value Ref Range Status   10/02/2020 9.2 8.7 - 10.5 mg/dL Final   07/09/2020 10.4 8.7 - 10.5 mg/dL Final     No results found for: PREALBUMIN  Total Protein   Date Value Ref Range Status   02/04/2020 7.2 6.0 - 8.4 g/dL Final   07/02/2019 7.3 6.0 - 8.4 g/dL Final     Cholesterol   Date Value Ref Range Status   02/04/2020 181 120 - 199 " mg/dL Final     Comment:     The National Cholesterol Education Program (NCEP) has set the  following guidelines (reference ranges) for Cholesterol:  Optimal.....................<200 mg/dL  Borderline High.............200-239 mg/dL  High........................> or = 240 mg/dL     07/02/2019 193 120 - 199 mg/dL Final     Comment:     The National Cholesterol Education Program (NCEP) has set the  following guidelines (reference ranges) for Cholesterol:  Optimal.....................<200 mg/dL  Borderline High.............200-239 mg/dL  High........................> or = 240 mg/dL       Hemoglobin A1C   Date Value Ref Range Status   07/09/2020 5.4 4.0 - 5.6 % Final     Comment:     ADA Screening Guidelines:  5.7-6.4%  Consistent with prediabetes  >or=6.5%  Consistent with diabetes  High levels of fetal hemoglobin interfere with the HbA1C  assay. Heterozygous hemoglobin variants (HbS, HgC, etc)do  not significantly interfere with this assay.   However, presence of multiple variants may affect accuracy.     02/04/2020 5.8 (H) 4.0 - 5.6 % Final     Comment:     ADA Screening Guidelines:  5.7-6.4%  Consistent with prediabetes  >or=6.5%  Consistent with diabetes  High levels of fetal hemoglobin interfere with the HbA1C  assay. Heterozygous hemoglobin variants (HbS, HgC, etc)do  not significantly interfere with this assay.   However, presence of multiple variants may affect accuracy.       Hemoglobin   Date Value Ref Range Status   07/02/2019 14.3 14.0 - 18.0 g/dL Final     Hematocrit   Date Value Ref Range Status   07/02/2019 44.2 40.0 - 54.0 % Final     No results found for: IRON  No components found for: FROLATE  Vit D, 25-Hydroxy   Date Value Ref Range Status   11/04/2019 26 (L) 30 - 96 ng/mL Final     Comment:     Vitamin D deficiency.........<10 ng/mL                              Vitamin D insufficiency......10-29 ng/mL       Vitamin D sufficiency........> or equal to 30 ng/mL  Vitamin D toxicity............>100  ng/mL     07/02/2019 13 (L) 30 - 96 ng/mL Final     Comment:     Vitamin D deficiency.........<10 ng/mL                              Vitamin D insufficiency......10-29 ng/mL       Vitamin D sufficiency........> or equal to 30 ng/mL  Vitamin D toxicity............>100 ng/mL       WBC   Date Value Ref Range Status   07/02/2019 7.61 3.90 - 12.70 K/uL Final       Assessment:     Nutrition/Diet History     Patient Reported Diet/Restrictions/Preferences: regular diet   Food Allergies: NKFA  Factors Affecting Nutritional Intake: odynophagia     Estimated/Assessed Needs     Weight Used For Calorie Calculations: 76.6 kg (168.5 lb)  Energy Calorie Requirements (kcal): 9495-7863 kcal/day   Energy Need Method: 25-30 Kcal/kg  Protein Requirements:  g/day   Protein Need Method: 1.2-1.5 g/kg  Fluid Requirements: 2000 ml/day  Estimated Fluid Requirement Method: 1ml/kcal      Nutrition Support  N/A    Evaluation of Received Nutrient/Fluid Intake     Calorie Intake: not meeting needs  Protein Intake: not meeting needs  Fluid Intake: meeting needs  Tolerance: tolerating  % Intake of Estimated Energy Needs: 75 %      Nutrition Diagnosis Related to (Etiology) As Evidenced By (Signs/Symptoms)   Swallowing difficulty Odynophagia 2' XRT for laryngeal cancer Need to transition to soft diet        RD Notes  F/u with Mr. Batres this morning prior to his radiation therapy appt. Pt's wife had called [facility] expressing concerns about pt's complaints of thick mucus that has been keeping him awake at night. During discussion with pt this morning, he confirmed issues with thick mucus, despite rinsing with baking soda/salt & drinking fluids. He also endorses increased pain in his throat that does not seem to be alleviated with Hycet. He also has magic mouthwash, which he reports burns so badly that he is unsure of whether or not he wants to continue using it. Despite odynophagia, pt has been able to maintain his wt thus far. CW:  168.8#    Nutrition Intervention:      Nutrition Intervention Texture-modified diet   Goals/Expected Outcomes Mechanical soft diet as tolerated to meet estimated energy needs.    Progress Initial       Plan  1. Soft/liquid diet q2-3 hrs as tolerated to meet energy needs.   2. Supplement with ONS BID- recommend Glucerna shakes for BG control.   3. Recommend liquid mucinex + increased fluid intake for thick mucus. This info was relayed to pt's wife via telephone call.   4. Advised pt to f/u with Dr. Portillo today re: pain mgmt.  5. Pt has RD contact info. Encouraged him to call with any questions/concerns.     Monitoring/Evaluation:     Monitor: wt, p.o. intake    Next Visit: Will f/u in 1 week.       I have explained and the patient understands all of  the current recommendation(s). I have answered all of their questions to the best of my ability and to their complete satisfaction.   The patient is to continue with the current management plan.    Electronically signed by: Marcie Ruiz MS, RDN, LDN

## 2020-10-07 ENCOUNTER — OFFICE VISIT (OUTPATIENT)
Dept: ENDOCRINOLOGY | Facility: CLINIC | Age: 70
End: 2020-10-07
Payer: MEDICARE

## 2020-10-07 ENCOUNTER — TREATMENT (OUTPATIENT)
Dept: RADIATION ONCOLOGY | Facility: CLINIC | Age: 70
End: 2020-10-07
Payer: MEDICARE

## 2020-10-07 VITALS
DIASTOLIC BLOOD PRESSURE: 68 MMHG | WEIGHT: 167.25 LBS | SYSTOLIC BLOOD PRESSURE: 128 MMHG | BODY MASS INDEX: 26.88 KG/M2 | TEMPERATURE: 98 F | OXYGEN SATURATION: 98 % | HEIGHT: 66 IN | HEART RATE: 80 BPM

## 2020-10-07 DIAGNOSIS — E78.5 HYPERLIPIDEMIA, UNSPECIFIED HYPERLIPIDEMIA TYPE: ICD-10-CM

## 2020-10-07 DIAGNOSIS — E11.9 TYPE 2 DIABETES MELLITUS WITHOUT COMPLICATION, WITH LONG-TERM CURRENT USE OF INSULIN: ICD-10-CM

## 2020-10-07 DIAGNOSIS — I10 BENIGN HYPERTENSION: ICD-10-CM

## 2020-10-07 DIAGNOSIS — C32.9 LARYNX NEOPLASM MALIGNANT: ICD-10-CM

## 2020-10-07 DIAGNOSIS — Z78.9 STATIN INTOLERANCE: ICD-10-CM

## 2020-10-07 DIAGNOSIS — N52.9 ERECTILE DYSFUNCTION, UNSPECIFIED ERECTILE DYSFUNCTION TYPE: ICD-10-CM

## 2020-10-07 DIAGNOSIS — Z79.4 TYPE 2 DIABETES MELLITUS WITH HYPERGLYCEMIA, WITH LONG-TERM CURRENT USE OF INSULIN: Primary | ICD-10-CM

## 2020-10-07 DIAGNOSIS — B35.1 ONYCHOMYCOSIS: ICD-10-CM

## 2020-10-07 DIAGNOSIS — Z79.4 TYPE 2 DIABETES MELLITUS WITHOUT COMPLICATION, WITH LONG-TERM CURRENT USE OF INSULIN: ICD-10-CM

## 2020-10-07 DIAGNOSIS — E55.9 HYPOVITAMINOSIS D: ICD-10-CM

## 2020-10-07 DIAGNOSIS — E11.65 TYPE 2 DIABETES MELLITUS WITH HYPERGLYCEMIA, WITH LONG-TERM CURRENT USE OF INSULIN: Primary | ICD-10-CM

## 2020-10-07 DIAGNOSIS — R13.13 DYSPHAGIA, PHARYNGEAL PHASE: Primary | ICD-10-CM

## 2020-10-07 PROCEDURE — G6015 RADIATION TX DELIVERY IMRT: HCPCS | Mod: S$GLB,,, | Performed by: RADIOLOGY

## 2020-10-07 PROCEDURE — 1159F PR MEDICATION LIST DOCUMENTED IN MEDICAL RECORD: ICD-10-PCS | Mod: S$GLB,,, | Performed by: PHYSICIAN ASSISTANT

## 2020-10-07 PROCEDURE — 3078F DIAST BP <80 MM HG: CPT | Mod: CPTII,S$GLB,, | Performed by: PHYSICIAN ASSISTANT

## 2020-10-07 PROCEDURE — 3074F PR MOST RECENT SYSTOLIC BLOOD PRESSURE < 130 MM HG: ICD-10-PCS | Mod: CPTII,S$GLB,, | Performed by: PHYSICIAN ASSISTANT

## 2020-10-07 PROCEDURE — 3008F PR BODY MASS INDEX (BMI) DOCUMENTED: ICD-10-PCS | Mod: CPTII,S$GLB,, | Performed by: PHYSICIAN ASSISTANT

## 2020-10-07 PROCEDURE — 3078F PR MOST RECENT DIASTOLIC BLOOD PRESSURE < 80 MM HG: ICD-10-PCS | Mod: CPTII,S$GLB,, | Performed by: PHYSICIAN ASSISTANT

## 2020-10-07 PROCEDURE — 1125F AMNT PAIN NOTED PAIN PRSNT: CPT | Mod: S$GLB,,, | Performed by: PHYSICIAN ASSISTANT

## 2020-10-07 PROCEDURE — 1125F PR PAIN SEVERITY QUANTIFIED, PAIN PRESENT: ICD-10-PCS | Mod: S$GLB,,, | Performed by: PHYSICIAN ASSISTANT

## 2020-10-07 PROCEDURE — 1159F MED LIST DOCD IN RCRD: CPT | Mod: S$GLB,,, | Performed by: PHYSICIAN ASSISTANT

## 2020-10-07 PROCEDURE — 99213 PR OFFICE/OUTPT VISIT, EST, LEVL III, 20-29 MIN: ICD-10-PCS | Mod: S$GLB,,, | Performed by: PHYSICIAN ASSISTANT

## 2020-10-07 PROCEDURE — 77014 PR  CT GUIDANCE PLACEMENT RAD THERAPY FIELDS: CPT | Mod: S$GLB,,, | Performed by: RADIOLOGY

## 2020-10-07 PROCEDURE — G6015 PR RADN TX DELIVERY,  INTENS MOD, 1+ FIELDS PER TX: ICD-10-PCS | Mod: S$GLB,,, | Performed by: RADIOLOGY

## 2020-10-07 PROCEDURE — 3008F BODY MASS INDEX DOCD: CPT | Mod: CPTII,S$GLB,, | Performed by: PHYSICIAN ASSISTANT

## 2020-10-07 PROCEDURE — 77014 PR  CT GUIDANCE PLACEMENT RAD THERAPY FIELDS: ICD-10-PCS | Mod: S$GLB,,, | Performed by: RADIOLOGY

## 2020-10-07 PROCEDURE — 99999 PR PBB SHADOW E&M-EST. PATIENT-LVL V: CPT | Mod: PBBFAC,,, | Performed by: PHYSICIAN ASSISTANT

## 2020-10-07 PROCEDURE — 3044F HG A1C LEVEL LT 7.0%: CPT | Mod: CPTII,S$GLB,, | Performed by: PHYSICIAN ASSISTANT

## 2020-10-07 PROCEDURE — 3044F PR MOST RECENT HEMOGLOBIN A1C LEVEL <7.0%: ICD-10-PCS | Mod: CPTII,S$GLB,, | Performed by: PHYSICIAN ASSISTANT

## 2020-10-07 PROCEDURE — 99999 PR PBB SHADOW E&M-EST. PATIENT-LVL V: ICD-10-PCS | Mod: PBBFAC,,, | Performed by: PHYSICIAN ASSISTANT

## 2020-10-07 PROCEDURE — 3074F SYST BP LT 130 MM HG: CPT | Mod: CPTII,S$GLB,, | Performed by: PHYSICIAN ASSISTANT

## 2020-10-07 PROCEDURE — 1101F PT FALLS ASSESS-DOCD LE1/YR: CPT | Mod: CPTII,S$GLB,, | Performed by: PHYSICIAN ASSISTANT

## 2020-10-07 PROCEDURE — 1101F PR PT FALLS ASSESS DOC 0-1 FALLS W/OUT INJ PAST YR: ICD-10-PCS | Mod: CPTII,S$GLB,, | Performed by: PHYSICIAN ASSISTANT

## 2020-10-07 PROCEDURE — 99213 OFFICE O/P EST LOW 20 MIN: CPT | Mod: S$GLB,,, | Performed by: PHYSICIAN ASSISTANT

## 2020-10-07 RX ORDER — LIDOCAINE HYDROCHLORIDE 20 MG/ML
SOLUTION OROPHARYNGEAL EVERY 4 HOURS
Qty: 100 ML | Refills: 5 | Status: SHIPPED | OUTPATIENT
Start: 2020-10-07 | End: 2021-04-06

## 2020-10-07 RX ORDER — PEN NEEDLE, DIABETIC 30 GX3/16"
1 NEEDLE, DISPOSABLE MISCELLANEOUS 2 TIMES DAILY
Qty: 200 EACH | Refills: 1 | Status: SHIPPED | OUTPATIENT
Start: 2020-10-07 | End: 2021-07-28 | Stop reason: SDUPTHER

## 2020-10-07 RX ORDER — NATEGLINIDE 60 MG/1
60 TABLET ORAL
Qty: 270 TABLET | Refills: 3 | Status: SHIPPED | OUTPATIENT
Start: 2020-10-07 | End: 2021-01-04

## 2020-10-07 NOTE — PROGRESS NOTES
"CC: This 70 y.o. male presents for management of Diabetes Mellitus  and chronic conditions pending review including HTN, HLP.    HPI: was diagnosed with T2DM in . States his his appetite has decreased. 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   Declines .  Has never been hospitalized r/t DM.  Family hx of DM: father  Fhx of thyroid disease: none  Denies missing doses of DM medication.   hypoglycemia at home: none  monitoring BG at home:  Fastin    Diet:   BF-cereal  LH-burrito, leftovers, chicken quesadilla   DN-2 slices of pizza  No snacks. Avoids sugary beverages.     Exercise: He has been walking 5 miles daily.     CURRENT DM MEDS: starlix 120 mg TID and metformin XR  1000mg BID, Lantus 25 units every morning.     Standards of Care:  Eye exam: Dr. Walters  Podiatry exam: n/a  DE: never.     Taking ergocalciferol 2x weekly.     PMHx, PSHx: reviewed in epic.  Social Hx: no E/T use.    ROS:   Gen: Appetite good, + weight gain (5 lbs), denies fatigue and weakness.  Skin: Skin is intact and heals well , no rashes, no hair changes  Eyes: Denies visual disturbances  Resp: no SOB or GRIFFIN, no cough  Cardiac: No palpitations, chest pain, no edema   GI: No nausea or vomiting, diarrhea, constipation, or abdominal pain.  /GYN: + difficulty with erections, No nocturia, burning or pain.   MS/Neuro: Denies numbness/ tingling in BLE; Gait steady, speech clear  Psych: Denies drug/ETOH abuse, no hx of depression.  Other systems: negative.    /68 (BP Location: Right arm, Patient Position: Sitting, BP Method: Small (Manual))   Pulse 80   Temp 98 °F (36.7 °C) (Temporal)   Ht 5' 6" (1.676 m)   Wt 75.8 kg (167 lb 3.5 oz)   SpO2 98%   BMI 26.99 kg/m²      PE:  GENERAL: middle aged talkative male, well developed, well nourished.  PSYCH: AAOx3, appropriate mood and affect, pleasant expression, conversant, appears " relaxed, well groomed.   EYES: Conjunctiva, corneas clear  NECK: Supple, trachea midline,no thyromegaly or nodules  CHEST: Resp even and unlabored, CTA bilateral.  CARDIAC: RRR, S1, S2 heard, no murmurs  ABDOMEN: Soft, non-tender, non-distended   VASCULAR: DP pulses +2/4 bilaterally, no edema.  NEURO: Gait steady, CN ll-Xll grossly intact  SKIN: Skin warm and dry no acanthosis nigracans.  Exam 11/19  Foot Exam: no sores or macerations noted. Onychomycosis on nails bl.    Protective Sensation (w/ 10 gram monofilament):  Right: Intact  Left: Intact    Visual Inspection:  Normal -  Bilateral and Nails Intact - without Evidence of Foot Deformity- Bilateral    Pedal Pulses:   Right: Present  Left: Present    Posterior tibialis:   Right:Present  Left: Present     Vibratory Sensation  Right:Positive  Left:Positive     Personally reviewed labs below:    Lab Visit on 10/02/2020   Component Date Value Ref Range Status    Glucose 10/02/2020 133* 70 - 110 mg/dL Final    Sodium 10/02/2020 140  136 - 145 mmol/L Final    Potassium 10/02/2020 5.1  3.5 - 5.1 mmol/L Final    Chloride 10/02/2020 107  95 - 110 mmol/L Final    CO2 10/02/2020 23  23 - 29 mmol/L Final    BUN, Bld 10/02/2020 21  8 - 23 mg/dL Final    Calcium 10/02/2020 9.2  8.7 - 10.5 mg/dL Final    Creatinine 10/02/2020 1.4  0.5 - 1.4 mg/dL Final    Albumin 10/02/2020 4.1  3.5 - 5.2 g/dL Final    Phosphorus 10/02/2020 3.4  2.7 - 4.5 mg/dL Final    eGFR if African American 10/02/2020 58.8* >60 mL/min/1.73 m^2 Final    eGFR if non African American 10/02/2020 50.9* >60 mL/min/1.73 m^2 Final    Comment: Calculation used to obtain the estimated glomerular filtration  rate (eGFR) is the CKD-EPI equation.       Anion Gap 10/02/2020 10  8 - 16 mmol/L Final       ASSESSMENT and PLAN:    1. Type 2 diabetes mellitus with hyperglycemia, with long-term current use of insulin  Hemoglobin A1C    Renal Function Panel    Microalbumin/Creatinine Ratio, Urine    Lipid Panel     "Comprehensive Metabolic Panel    Hemoglobin A1C    Ambulatory referral/consult to Ophthalmology   2. Benign hypertension     3. Hyperlipidemia, unspecified hyperlipidemia type     4. Hypovitaminosis D  Vitamin D   5. Onychomycosis     6. Statin intolerance     7. Erectile dysfunction, unspecified erectile dysfunction type  Testosterone Panel   8. Type 2 diabetes mellitus without complication, with long-term current use of insulin  pen needle, diabetic (BD ULTRA-FINE YULISSA PEN NEEDLE) 32 gauge x 5/32" Ndle      T2DM with hyperglycemia-Pt declines labs today. He is planning on gaining weight by drinking milkshakes and eating junk food because he will not be able to eat during his radiology treatments this month. Continue nateglinide, Ozempic, and metformin. Discussed DM, progression of disease, long term complications, tx options.     - takes ASA, ARB, statin    HTN - controlled, continue ASA- monitor.   HLP - LDL , on statin therapy, LFTs WNL  Hypovitaminosis d-low-continue ergocalciferol 2x weekly  Onychomycosis- continue ciclopirox twice daily to affected nails.  Statin intolerance-monitor cholesterol  ED- testosterone is wnl-monitor    Follow-up: in 3 months with lab prior         "

## 2020-10-12 ENCOUNTER — TREATMENT (OUTPATIENT)
Dept: RADIATION ONCOLOGY | Facility: CLINIC | Age: 70
End: 2020-10-12
Payer: MEDICARE

## 2020-10-12 DIAGNOSIS — C32.0 MALIGNANT NEOPLASM OF TRUE VOCAL CORD: ICD-10-CM

## 2020-10-12 PROCEDURE — 77014 PR  CT GUIDANCE PLACEMENT RAD THERAPY FIELDS: ICD-10-PCS | Mod: S$GLB,,, | Performed by: RADIOLOGY

## 2020-10-12 PROCEDURE — G6015 PR RADN TX DELIVERY,  INTENS MOD, 1+ FIELDS PER TX: ICD-10-PCS | Mod: S$GLB,,, | Performed by: RADIOLOGY

## 2020-10-12 PROCEDURE — G6015 RADIATION TX DELIVERY IMRT: HCPCS | Mod: S$GLB,,, | Performed by: RADIOLOGY

## 2020-10-12 PROCEDURE — 77014 PR  CT GUIDANCE PLACEMENT RAD THERAPY FIELDS: CPT | Mod: S$GLB,,, | Performed by: RADIOLOGY

## 2020-10-12 RX ORDER — HYDROCODONE BITARTRATE AND ACETAMINOPHEN 7.5; 325 MG/15ML; MG/15ML
15 SOLUTION ORAL
Qty: 750 ML | Refills: 0 | Status: CANCELLED | OUTPATIENT
Start: 2020-10-12

## 2020-10-12 RX ORDER — HYDROCODONE BITARTRATE AND ACETAMINOPHEN 7.5; 325 MG/15ML; MG/15ML
15 SOLUTION ORAL
Qty: 750 ML | Refills: 0 | Status: SHIPPED | OUTPATIENT
Start: 2020-10-12 | End: 2020-11-18 | Stop reason: SDUPTHER

## 2020-10-13 DIAGNOSIS — C32.0 MALIGNANT NEOPLASM OF TRUE VOCAL CORD: Primary | ICD-10-CM

## 2020-10-13 RX ORDER — FENTANYL 12.5 UG/1
1 PATCH TRANSDERMAL
Qty: 5 PATCH | Refills: 0 | Status: SHIPPED | OUTPATIENT
Start: 2020-10-13 | End: 2020-10-27 | Stop reason: SDUPTHER

## 2020-10-14 PROCEDURE — 77427 RADIATION TX MANAGEMENT X5: CPT | Mod: S$GLB,,, | Performed by: RADIOLOGY

## 2020-10-14 PROCEDURE — 77427 PR CHG RADIATION,MANGEMENT,5 TX'S: ICD-10-PCS | Mod: S$GLB,,, | Performed by: RADIOLOGY

## 2020-10-16 ENCOUNTER — TELEPHONE (OUTPATIENT)
Dept: ENDOCRINOLOGY | Facility: CLINIC | Age: 70
End: 2020-10-16

## 2020-10-16 ENCOUNTER — DOCUMENTATION ONLY (OUTPATIENT)
Dept: HEMATOLOGY/ONCOLOGY | Facility: CLINIC | Age: 70
End: 2020-10-16

## 2020-10-16 ENCOUNTER — PATIENT MESSAGE (OUTPATIENT)
Dept: ENDOCRINOLOGY | Facility: CLINIC | Age: 70
End: 2020-10-16

## 2020-10-16 NOTE — TELEPHONE ENCOUNTER
Discussed medication with patient. Pt can swallow starlix but not metformin. He has been eating less and glucoses have been <100. He can stop metformin until his radiation treatments are completed. Continue Starlix and Lantus.

## 2020-10-16 NOTE — PROGRESS NOTES
Medical Nutrition Therapy Oncology Progress Note      Patient's PCP:Diana Calhoun MD  Referring Provider: No ref. provider found    Recommendations/Interventions     RD Notes  Mr. 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 He is having increasing odynophagia & has transitioned to a softer diet, but is still able to tolerate most of his usual foods. He is taking Hycet & now has a pain patch, which he reports much improvement with. Wt dropped from 168# to 165# over the past week. Still reports issues with thick mucus, but has increased his fluid intake in an attempt to lessen this problem. He is not taking mucinex as previously recommended.     Plan  1. Soft/liquid diet q2-3 hrs as tolerated to meet energy needs.   2. Supplement with ONS BID- Glucerna or premier protein shakes.   3. Aggressive fluid intake.   4. Pain mgmt has prescribed.   5. Reinforced importance of nutritional intake to promote wt maintenance.   6. Pt has RD contact info- encouraged him to call with any questions/concerns.     Subjective:        Patient ID: Cyrus Batres Jr. is a 70 y.o. male.    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    MICROLARYNGOSCOPY N/A 3/17/2020    Procedure: MICROLARYNGOSCOPY;  Surgeon: Jung Xiao MD;  [facility];  Service: ENT;  Laterality: N/A;  Laser Microlaryngoscopy  NEED TO SCHEDULE  LASER from [facility] [id number]       Social History     Socioeconomic History    Marital status:      Spouse name: Janel Batres    Number of children: 2    Years of education: Not on file    Highest education level: Not on file   Occupational History    Occupation: [employer]     Employer: [employer]   Social Needs    Financial resource strain: Not on file    Food insecurity     Worry: Not on file     Inability: Not on file    Transportation needs     Medical: Not on file     Non-medical: Not on file   Tobacco Use    Smoking status: Never Smoker    Smokeless tobacco: Never Used   Substance and Sexual Activity    Alcohol use: Yes     Comment: occasional    Drug use: No    Sexual activity: Yes     Partners: Female   Lifestyle    Physical activity     Days per week: Not on file     Minutes per session: Not on file    Stress: Only a little   Relationships    Social connections     Talks on phone: Not on file     Gets together: Not on file     Attends Faith service: Not on file     Active member of club or organization: Not on file     Attends meetings of clubs or organizations: Not on file     Relationship status: Not on file   Other Topics Concern    Not on file   Social History Narrative    2 children from his 1st wife       Family History   Problem Relation Age of Onset    Abnormal EKG Mother     Diabetes Father     Heart disease Father     Hypertension Father        Review of patient's allergies indicates:   Allergen Reactions    Pollen extracts     Lovastatin Rash     Not confirmed but pt skeptical       Current Outpatient Medications:     acetaminophen (TYLENOL) 500 MG tablet, Take 1 tablet (500 mg total) by mouth every 6 (six) hours as needed for Pain. (Patient not taking: Reported on 10/7/2020), Disp: 30 tablet, Rfl: 0    aspirin (ECOTRIN) 81 MG EC tablet, Take 1 tablet by mouth Daily., Disp: , Rfl:     blood sugar diagnostic (BLOOD GLUCOSE TEST) Strp, 1 strip by  Misc.(Non-Drug; Combo Route) route 2 (two) times daily before meals., Disp: 200 strip, Rfl: 2    ciclopirox (LOPROX) 0.77 % Crea, Apply topically 2 (two) times daily., Disp: 1 Tube, Rfl: 2    cyanocobalamin (VITAMIN B-12) 500 MCG tablet, Take 500 mcg by mouth once daily., Disp: , Rfl:     duke's soln (benadryl 30 mL, mylanta 30 mL, lidocaine 30 mL, nystatin 30 mL) 120mL, Take 10 mLs by mouth 4 (four) times daily., Disp: 120 mL, Rfl: 0    ELIQUIS 5 mg Tab, TAKE 1 TABLET TWICE DAILY, Disp: 180 tablet, Rfl: 2    ergocalciferol (ERGOCALCIFEROL) 50,000 unit Cap, TAKE ONE CAPSULE BY MOUTH ONE TIME PER WEEK, Disp: 12 capsule, Rfl: 3    esomeprazole (NEXIUM) 20 MG capsule, Take 20 mg by mouth before breakfast., Disp: , Rfl:     fentaNYL (DURAGESIC) 12 mcg/hr PT72, Place 1 patch onto the skin every 72 hours., Disp: 5 patch, Rfl: 0    fluticasone (FLONASE) 50 mcg/actuation nasal spray, USE 2 SPRAYS TO EACH NOSTRIL ONCE A DAY, Disp: 3 Bottle, Rfl: 3    hydrocodone-acetaminophen (HYCET) solution 7.5-325 mg/15mL, Take 15 mLs by mouth every 4 to 6 hours as needed for Pain., Disp: 750 mL, Rfl: 0    ibuprofen (ADVIL,MOTRIN) 400 MG tablet, Take 1 tablet (400 mg total) by mouth every 6 (six) hours as needed for Other (pain)., Disp: 30 tablet, Rfl: 0    insulin (LANTUS SOLOSTAR U-100 INSULIN) glargine 100 units/mL (3mL) SubQ pen, Inject 10 units nightly. Increase by 2 units every other day if glucose is still >200 in the morning., Disp: 15 mL, Rfl: 3    lidocaine HCl 2% (XYLOCAINE) 2 % Soln, by Mucous Membrane route every 4 (four) hours. 1 teaspoon up to every 2 hours as needed, Disp: 100 mL, Rfl: 5    losartan (COZAAR) 100 MG tablet, Take 1 tablet (100 mg total) by mouth once daily., Disp: 90 tablet, Rfl: 3    metFORMIN (GLUCOPHAGE-XR) 500 MG ER 24hr tablet, TAKE 2 TABLETS TWICE DAILY WITH MEALS, Disp: 360 tablet, Rfl: 3    metoprolol tartrate (LOPRESSOR) 25 MG tablet, Take 1 tablet (25 mg total) by mouth 2 (two) times  "daily., Disp: 180 tablet, Rfl: 3    nateglinide (STARLIX) 60 MG tablet, Take 1 tablet (60 mg total) by mouth 3 (three) times daily before meals., Disp: 270 tablet, Rfl: 3    pen needle, diabetic (BD ULTRA-FINE YULISSA PEN NEEDLE) 32 gauge x 5/32" Ndle, 1 application by Misc.(Non-Drug; Combo Route) route 2 (two) times daily., Disp: 200 each, Rfl: 1    semaglutide (OZEMPIC) 0.25 mg or 0.5 mg(2 mg/1.5 mL) PnIj, Inject 0.5 mg into the skin weekly., Disp: 2 Syringe, Rfl: 6    SHINGRIX, PF, 50 mcg/0.5 mL injection, TO BE ADMINISTERED BY PHARMACIST FOR IMMUNIZATION, Disp: , Rfl: 1  No current facility-administered medications for this visit.     Facility-Administered Medications Ordered in Other Visits:     lactated ringers infusion, , Intravenous, Continuous, Torsten Hilton MD    All medications and past history have been reviewed.    [No treatment plan]    Objective:        Wt Readings from Last 1 Encounters:   10/07/20 1333 75.8 kg (167 lb 3.5 oz)       Last Labs:  Glucose   Date Value Ref Range Status   10/02/2020 133 (H) 70 - 110 mg/dL Final   07/09/2020 99 70 - 110 mg/dL Final     BUN, Bld   Date Value Ref Range Status   10/02/2020 21 8 - 23 mg/dL Final   07/09/2020 19 8 - 23 mg/dL Final     Creatinine   Date Value Ref Range Status   10/02/2020 1.4 0.5 - 1.4 mg/dL Final   09/04/2020 1.2 0.5 - 1.4 mg/dL Final     Sodium   Date Value Ref Range Status   10/02/2020 140 136 - 145 mmol/L Final   07/09/2020 140 136 - 145 mmol/L Final     Potassium   Date Value Ref Range Status   10/02/2020 5.1 3.5 - 5.1 mmol/L Final   07/09/2020 3.9 3.5 - 5.1 mmol/L Final     Phosphorus   Date Value Ref Range Status   10/02/2020 3.4 2.7 - 4.5 mg/dL Final   07/09/2020 3.2 2.7 - 4.5 mg/dL Final     Calcium   Date Value Ref Range Status   10/02/2020 9.2 8.7 - 10.5 mg/dL Final   07/09/2020 10.4 8.7 - 10.5 mg/dL Final     No results found for: PREALBUMIN  Total Protein   Date Value Ref Range Status   02/04/2020 7.2 6.0 - 8.4 g/dL Final "   07/02/2019 7.3 6.0 - 8.4 g/dL Final     Cholesterol   Date Value Ref Range Status   02/04/2020 181 120 - 199 mg/dL Final     Comment:     The National Cholesterol Education Program (NCEP) has set the  following guidelines (reference ranges) for Cholesterol:  Optimal.....................<200 mg/dL  Borderline High.............200-239 mg/dL  High........................> or = 240 mg/dL     07/02/2019 193 120 - 199 mg/dL Final     Comment:     The National Cholesterol Education Program (NCEP) has set the  following guidelines (reference ranges) for Cholesterol:  Optimal.....................<200 mg/dL  Borderline High.............200-239 mg/dL  High........................> or = 240 mg/dL       Hemoglobin A1C   Date Value Ref Range Status   07/09/2020 5.4 4.0 - 5.6 % Final     Comment:     ADA Screening Guidelines:  5.7-6.4%  Consistent with prediabetes  >or=6.5%  Consistent with diabetes  High levels of fetal hemoglobin interfere with the HbA1C  assay. Heterozygous hemoglobin variants (HbS, HgC, etc)do  not significantly interfere with this assay.   However, presence of multiple variants may affect accuracy.     02/04/2020 5.8 (H) 4.0 - 5.6 % Final     Comment:     ADA Screening Guidelines:  5.7-6.4%  Consistent with prediabetes  >or=6.5%  Consistent with diabetes  High levels of fetal hemoglobin interfere with the HbA1C  assay. Heterozygous hemoglobin variants (HbS, HgC, etc)do  not significantly interfere with this assay.   However, presence of multiple variants may affect accuracy.       Hemoglobin   Date Value Ref Range Status   07/02/2019 14.3 14.0 - 18.0 g/dL Final     Hematocrit   Date Value Ref Range Status   07/02/2019 44.2 40.0 - 54.0 % Final     No results found for: IRON  No components found for: FROLATE  Vit D, 25-Hydroxy   Date Value Ref Range Status   11/04/2019 26 (L) 30 - 96 ng/mL Final     Comment:     Vitamin D deficiency.........<10 ng/mL                              Vitamin D  insufficiency......10-29 ng/mL       Vitamin D sufficiency........> or equal to 30 ng/mL  Vitamin D toxicity............>100 ng/mL     07/02/2019 13 (L) 30 - 96 ng/mL Final     Comment:     Vitamin D deficiency.........<10 ng/mL                              Vitamin D insufficiency......10-29 ng/mL       Vitamin D sufficiency........> or equal to 30 ng/mL  Vitamin D toxicity............>100 ng/mL       WBC   Date Value Ref Range Status   07/02/2019 7.61 3.90 - 12.70 K/uL Final       Assessment:     Nutrition/Diet History     Patient Reported Diet/Restrictions/Preferences: mechanical soft diet   Food Allergies: NKFA  Factors Affecting Nutritional Intake: odynophagia     Estimated/Assessed Needs     Weight Used For Calorie Calculations: 76.6 kg (168.5 lb)  Energy Calorie Requirements (kcal): 7959-0116 kcal/day   Energy Need Method: 25-30 Kcal/kg  Protein Requirements:  g/day   Protein Need Method: 1.2-1.5 g/kg  Fluid Requirements: 2000 ml/day  Estimated Fluid Requirement Method: 1ml/kcal      Nutrition Support  N/A    Evaluation of Received Nutrient/Fluid Intake     Calorie Intake: not meeting needs  Protein Intake: not meeting needs  Fluid Intake: meeting needs  Tolerance: tolerating  % Intake of Estimated Energy Needs: 75 %      Nutrition Diagnosis Related to (Etiology) As Evidenced By (Signs/Symptoms)   Swallowing difficulty Odynophagia 2' XRT for laryngeal cancer Need to transition to soft diet        RD Notes  Mr. 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 He is having increasing odynophagia & has transitioned to a softer diet, but is still able to tolerate most of his usual foods. He is taking Hycet & now has a pain patch, which he reports much improvement with. Wt dropped from 168# to 165# over the past week. Still reports issues with thick mucus, but has increased his fluid intake in an attempt to lessen this problem. He is not taking mucinex as previously recommended.      Nutrition Intervention:      Nutrition Intervention Texture-modified diet   Goals/Expected Outcomes Mechanical soft diet as tolerated to meet estimated energy needs.    Progress Initial       Plan  1. Soft/liquid diet q2-3 hrs as tolerated to meet energy needs.   2. Supplement with ONS BID- Glucerna or premier protein shakes.   3. Aggressive fluid intake.   4. Pain mgmt has prescribed.   5. Reinforced importance of nutritional intake to promote wt maintenance.   6. Pt has RD contact info- encouraged him to call with any questions/concerns.     Monitoring/Evaluation:     Monitor: wt, p.o. intake    Next Visit: Will f/u next week.       I have explained and the patient understands all of  the current recommendation(s). I have answered all of their questions to the best of my ability and to their complete satisfaction.   The patient is to continue with the current management plan.    Electronically signed by: Marcie Ruiz MS, RDN, LDN

## 2020-10-20 ENCOUNTER — TREATMENT (OUTPATIENT)
Dept: RADIATION ONCOLOGY | Facility: CLINIC | Age: 70
End: 2020-10-20
Payer: MEDICARE

## 2020-10-20 DIAGNOSIS — C32.0 MALIGNANT NEOPLASM OF TRUE VOCAL CORD: Primary | ICD-10-CM

## 2020-10-20 PROCEDURE — G6015 PR RADN TX DELIVERY,  INTENS MOD, 1+ FIELDS PER TX: ICD-10-PCS | Mod: S$GLB,,, | Performed by: RADIOLOGY

## 2020-10-20 PROCEDURE — 77336 RADIATION PHYSICS CONSULT: CPT | Mod: S$GLB,,, | Performed by: RADIOLOGY

## 2020-10-20 PROCEDURE — 77014 PR  CT GUIDANCE PLACEMENT RAD THERAPY FIELDS: CPT | Mod: S$GLB,,, | Performed by: RADIOLOGY

## 2020-10-20 PROCEDURE — 77336 PR  RADN PHYSICS CONSULT CONTINUING: ICD-10-PCS | Mod: S$GLB,,, | Performed by: RADIOLOGY

## 2020-10-20 PROCEDURE — G6015 RADIATION TX DELIVERY IMRT: HCPCS | Mod: S$GLB,,, | Performed by: RADIOLOGY

## 2020-10-20 PROCEDURE — 77014 PR  CT GUIDANCE PLACEMENT RAD THERAPY FIELDS: ICD-10-PCS | Mod: S$GLB,,, | Performed by: RADIOLOGY

## 2020-10-20 RX ORDER — ONDANSETRON 8 MG/1
8 TABLET, ORALLY DISINTEGRATING ORAL EVERY 8 HOURS PRN
Qty: 90 TABLET | Refills: 3 | Status: SHIPPED | OUTPATIENT
Start: 2020-10-20 | End: 2021-05-03 | Stop reason: CLARIF

## 2020-10-21 PROCEDURE — 77427 RADIATION TX MANAGEMENT X5: CPT | Mod: S$GLB,,, | Performed by: RADIOLOGY

## 2020-10-21 PROCEDURE — 77427 PR CHG RADIATION,MANGEMENT,5 TX'S: ICD-10-PCS | Mod: S$GLB,,, | Performed by: RADIOLOGY

## 2020-10-23 ENCOUNTER — DOCUMENTATION ONLY (OUTPATIENT)
Dept: HEMATOLOGY/ONCOLOGY | Facility: CLINIC | Age: 70
End: 2020-10-23

## 2020-10-23 NOTE — PROGRESS NOTES
Medical Nutrition Therapy Oncology Progress Note      Patient's PCP:Diana Calhoun MD  Referring Provider: No ref. provider found    Recommendations/Interventions     RD Notes  Mr. Batres has now received 28/33 fractions of RT for laryngeal cancer. RD met with pt prior to radiation appt 10/22. He endorses improved caloric intake since taking pain meds on a consistent basis. He has a pain patch as well as hycet. He is also using magic mw before eating with benefit. He is tolerating a mechanical soft diet well. Maintaining his wt @ 162-165#. He denies any new concerns at this time.     Plan  1. Continue with mechanical soft diet q2-3 hrs as tolerated.   2. Pleased with pt's wt maintenance over the past week.   3. Encouraged continued calorie/fluid intake as he finishes up his treatment.   4. Pain mgmt as prescribed.   5. Pt has RD contact info; encouraged him to call with any questions/concerns.     Subjective:        Patient ID: Cyrus Batres Jr. is a 70 y.o. male.    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    MICROLARYNGOSCOPY N/A 3/17/2020    Procedure: MICROLARYNGOSCOPY;  Surgeon: Jung Xiao MD;  Location: [facility] OR;  Service: ENT;  Laterality: N/A;  Laser Microlaryngoscopy  NEED TO SCHEDULE LASER from [facility] [id number]       Social History     Socioeconomic History    Marital status:      Spouse  name: Janel Batres    Number of children: 2    Years of education: Not on file    Highest education level: Not on file   Occupational History    Occupation: [employer]     Employer: [employer]   Social Needs    Financial resource strain: Not on file    Food insecurity     Worry: Not on file     Inability: Not on file    Transportation needs     Medical: Not on file     Non-medical: Not on file   Tobacco Use    Smoking status: Never Smoker    Smokeless tobacco: Never Used   Substance and Sexual Activity    Alcohol use: Yes     Comment: occasional    Drug use: No    Sexual activity: Yes     Partners: Female   Lifestyle    Physical activity     Days per week: Not on file     Minutes per session: Not on file    Stress: Only a little   Relationships    Social connections     Talks on phone: Not on file     Gets together: Not on file     Attends Quaker service: Not on file     Active member of club or organization: Not on file     Attends meetings of clubs or organizations: Not on file     Relationship status: Not on file   Other Topics Concern    Not on file   Social History Narrative    2 children from his 1st wife       Family History   Problem Relation Age of Onset    Abnormal EKG Mother     Diabetes Father     Heart disease Father     Hypertension Father        Review of patient's allergies indicates:   Allergen Reactions    Pollen extracts     Lovastatin Rash     Not confirmed but pt skeptical       Current Outpatient Medications:     acetaminophen (TYLENOL) 500 MG tablet, Take 1 tablet (500 mg total) by mouth every 6 (six) hours as needed for Pain. (Patient not taking: Reported on 10/7/2020), Disp: 30 tablet, Rfl: 0    aspirin (ECOTRIN) 81 MG EC tablet, Take 1 tablet by mouth Daily., Disp: , Rfl:     blood sugar diagnostic (BLOOD GLUCOSE TEST) Strp, 1 strip by Misc.(Non-Drug; Combo Route) route 2 (two) times daily before meals., Disp: 200 strip, Rfl: 2    ciclopirox (LOPROX) 0.77 %  Crea, Apply topically 2 (two) times daily., Disp: 1 Tube, Rfl: 2    cyanocobalamin (VITAMIN B-12) 500 MCG tablet, Take 500 mcg by mouth once daily., Disp: , Rfl:     duke's soln (benadryl 30 mL, mylanta 30 mL, lidocaine 30 mL, nystatin 30 mL) 120mL, Take 10 mLs by mouth 4 (four) times daily., Disp: 120 mL, Rfl: 0    ELIQUIS 5 mg Tab, TAKE 1 TABLET TWICE DAILY, Disp: 180 tablet, Rfl: 2    ergocalciferol (ERGOCALCIFEROL) 50,000 unit Cap, TAKE ONE CAPSULE BY MOUTH ONE TIME PER WEEK, Disp: 12 capsule, Rfl: 3    esomeprazole (NEXIUM) 20 MG capsule, Take 20 mg by mouth before breakfast., Disp: , Rfl:     fentaNYL (DURAGESIC) 12 mcg/hr PT72, Place 1 patch onto the skin every 72 hours., Disp: 5 patch, Rfl: 0    fluticasone (FLONASE) 50 mcg/actuation nasal spray, USE 2 SPRAYS TO EACH NOSTRIL ONCE A DAY, Disp: 3 Bottle, Rfl: 3    hydrocodone-acetaminophen (HYCET) solution 7.5-325 mg/15mL, Take 15 mLs by mouth every 4 to 6 hours as needed for Pain., Disp: 750 mL, Rfl: 0    ibuprofen (ADVIL,MOTRIN) 400 MG tablet, Take 1 tablet (400 mg total) by mouth every 6 (six) hours as needed for Other (pain)., Disp: 30 tablet, Rfl: 0    insulin (LANTUS SOLOSTAR U-100 INSULIN) glargine 100 units/mL (3mL) SubQ pen, Inject 10 units nightly. Increase by 2 units every other day if glucose is still >200 in the morning., Disp: 15 mL, Rfl: 3    lidocaine HCl 2% (XYLOCAINE) 2 % Soln, by Mucous Membrane route every 4 (four) hours. 1 teaspoon up to every 2 hours as needed, Disp: 100 mL, Rfl: 5    losartan (COZAAR) 100 MG tablet, Take 1 tablet (100 mg total) by mouth once daily., Disp: 90 tablet, Rfl: 3    metFORMIN (GLUCOPHAGE-XR) 500 MG ER 24hr tablet, TAKE 2 TABLETS TWICE DAILY WITH MEALS, Disp: 360 tablet, Rfl: 3    metoprolol tartrate (LOPRESSOR) 25 MG tablet, Take 1 tablet (25 mg total) by mouth 2 (two) times daily., Disp: 180 tablet, Rfl: 3    nateglinide (STARLIX) 60 MG tablet, Take 1 tablet (60 mg total) by mouth 3 (three)  "times daily before meals., Disp: 270 tablet, Rfl: 3    ondansetron (ZOFRAN-ODT) 8 MG TbDL, Take 1 tablet (8 mg total) by mouth every 8 (eight) hours as needed., Disp: 90 tablet, Rfl: 3    pen needle, diabetic (BD ULTRA-FINE YULISSA PEN NEEDLE) 32 gauge x 5/32" Ndle, 1 application by Misc.(Non-Drug; Combo Route) route 2 (two) times daily., Disp: 200 each, Rfl: 1    semaglutide (OZEMPIC) 0.25 mg or 0.5 mg(2 mg/1.5 mL) PnIj, Inject 0.5 mg into the skin weekly., Disp: 2 Syringe, Rfl: 6    SHINGRIX, PF, 50 mcg/0.5 mL injection, TO BE ADMINISTERED BY PHARMACIST FOR IMMUNIZATION, Disp: , Rfl: 1  No current facility-administered medications for this visit.     Facility-Administered Medications Ordered in Other Visits:     lactated ringers infusion, , Intravenous, Continuous, Torsten Hilton MD    All medications and past history have been reviewed.    [No treatment plan]    Objective:        Wt Readings from Last 1 Encounters:   10/07/20 1333 75.8 kg (167 lb 3.5 oz)       Last Labs:  Glucose   Date Value Ref Range Status   10/02/2020 133 (H) 70 - 110 mg/dL Final   07/09/2020 99 70 - 110 mg/dL Final     BUN, Bld   Date Value Ref Range Status   10/02/2020 21 8 - 23 mg/dL Final   07/09/2020 19 8 - 23 mg/dL Final     Creatinine   Date Value Ref Range Status   10/02/2020 1.4 0.5 - 1.4 mg/dL Final   09/04/2020 1.2 0.5 - 1.4 mg/dL Final     Sodium   Date Value Ref Range Status   10/02/2020 140 136 - 145 mmol/L Final   07/09/2020 140 136 - 145 mmol/L Final     Potassium   Date Value Ref Range Status   10/02/2020 5.1 3.5 - 5.1 mmol/L Final   07/09/2020 3.9 3.5 - 5.1 mmol/L Final     Phosphorus   Date Value Ref Range Status   10/02/2020 3.4 2.7 - 4.5 mg/dL Final   07/09/2020 3.2 2.7 - 4.5 mg/dL Final     Calcium   Date Value Ref Range Status   10/02/2020 9.2 8.7 - 10.5 mg/dL Final   07/09/2020 10.4 8.7 - 10.5 mg/dL Final     No results found for: PREALBUMIN  Total Protein   Date Value Ref Range Status   02/04/2020 7.2 6.0 - 8.4 " g/dL Final   07/02/2019 7.3 6.0 - 8.4 g/dL Final     Cholesterol   Date Value Ref Range Status   02/04/2020 181 120 - 199 mg/dL Final     Comment:     The National Cholesterol Education Program (NCEP) has set the  following guidelines (reference ranges) for Cholesterol:  Optimal.....................<200 mg/dL  Borderline High.............200-239 mg/dL  High........................> or = 240 mg/dL     07/02/2019 193 120 - 199 mg/dL Final     Comment:     The National Cholesterol Education Program (NCEP) has set the  following guidelines (reference ranges) for Cholesterol:  Optimal.....................<200 mg/dL  Borderline High.............200-239 mg/dL  High........................> or = 240 mg/dL       Hemoglobin A1C   Date Value Ref Range Status   07/09/2020 5.4 4.0 - 5.6 % Final     Comment:     ADA Screening Guidelines:  5.7-6.4%  Consistent with prediabetes  >or=6.5%  Consistent with diabetes  High levels of fetal hemoglobin interfere with the HbA1C  assay. Heterozygous hemoglobin variants (HbS, HgC, etc)do  not significantly interfere with this assay.   However, presence of multiple variants may affect accuracy.     02/04/2020 5.8 (H) 4.0 - 5.6 % Final     Comment:     ADA Screening Guidelines:  5.7-6.4%  Consistent with prediabetes  >or=6.5%  Consistent with diabetes  High levels of fetal hemoglobin interfere with the HbA1C  assay. Heterozygous hemoglobin variants (HbS, HgC, etc)do  not significantly interfere with this assay.   However, presence of multiple variants may affect accuracy.       Hemoglobin   Date Value Ref Range Status   07/02/2019 14.3 14.0 - 18.0 g/dL Final     Hematocrit   Date Value Ref Range Status   07/02/2019 44.2 40.0 - 54.0 % Final     No results found for: IRON  No components found for: FROLATE  Vit D, 25-Hydroxy   Date Value Ref Range Status   11/04/2019 26 (L) 30 - 96 ng/mL Final     Comment:     Vitamin D deficiency.........<10 ng/mL                              Vitamin D  insufficiency......10-29 ng/mL       Vitamin D sufficiency........> or equal to 30 ng/mL  Vitamin D toxicity............>100 ng/mL     07/02/2019 13 (L) 30 - 96 ng/mL Final     Comment:     Vitamin D deficiency.........<10 ng/mL                              Vitamin D insufficiency......10-29 ng/mL       Vitamin D sufficiency........> or equal to 30 ng/mL  Vitamin D toxicity............>100 ng/mL       WBC   Date Value Ref Range Status   07/02/2019 7.61 3.90 - 12.70 K/uL Final       Assessment:     Nutrition/Diet History     Patient Reported Diet/Restrictions/Preferences: mechanical soft diet   Food Allergies: NKFA  Factors Affecting Nutritional Intake: odynophagia    Estimated/Assessed Needs     Weight Used For Calorie Calculations: 76.6 kg (168.5 lb)  Energy Calorie Requirements (kcal): 5192-7512 kcal/day   Energy Need Method: 25-30 Kcal/kg  Protein Requirements:  g/day   Protein Need Method: 1.2-1.5 g/kg  Fluid Requirements: 2000 ml/day  Estimated Fluid Requirement Method: 1ml/kcal      Nutrition Support  N/A    Evaluation of Received Nutrient/Fluid Intake     Calorie Intake: meeting needs  Protein Intake: meeting needs  Fluid Intake: meeting needs  Tolerance: tolerating  % Intake of Estimated Energy Needs:  %      Nutrition Diagnosis Related to (Etiology) As Evidenced By (Signs/Symptoms)   Swallowing difficulty Odynophagia 2' RT for laryngeal cancer Need to transition to softer diet        RD Notes  Mr. Batres has now received 28/33 fractions of RT for laryngeal cancer. RD met with pt prior to radiation appt 10/22. He endorses improved caloric intake since taking pain meds on a consistent basis. He has a pain patch as well as hycet. He is also using magic mw before eating with benefit. He is tolerating a mechanical soft diet well. Maintaining his wt @ 162-165#. He denies any new concerns at this time.     Nutrition Intervention:      Nutrition Intervention Texture-modified diet   Goals/Expected  Outcomes Mechanical soft diet as tolerated to meet estimated energy needs.    Progress Goal Met       Plan  1. Continue with mechanical soft diet q2-3 hrs as tolerated.   2. Pleased with pt's wt maintenance over the past week.   3. Encouraged continued calorie/fluid intake as he finishes up his treatment.   4. Pain mgmt as prescribed.   5. Pt has RD contact info; encouraged him to call with any questions/concerns.     Monitoring/Evaluation:     Monitor: wt, p.o. intake    Next Visit: Will f/u in 1 week.       I have explained and the patient understands all of  the current recommendation(s). I have answered all of their questions to the best of my ability and to their complete satisfaction.   The patient is to continue with the current management plan.    Electronically signed by: Marcie Ruiz MS, RDN, LDN

## 2020-10-25 ENCOUNTER — PATIENT MESSAGE (OUTPATIENT)
Dept: FAMILY MEDICINE | Facility: CLINIC | Age: 70
End: 2020-10-25

## 2020-10-27 ENCOUNTER — TREATMENT (OUTPATIENT)
Dept: RADIATION ONCOLOGY | Facility: CLINIC | Age: 70
End: 2020-10-27
Payer: MEDICARE

## 2020-10-27 DIAGNOSIS — E86.0 DEHYDRATION: ICD-10-CM

## 2020-10-27 DIAGNOSIS — C32.0 MALIGNANT NEOPLASM OF TRUE VOCAL CORD: ICD-10-CM

## 2020-10-27 PROCEDURE — 77336 PR  RADN PHYSICS CONSULT CONTINUING: ICD-10-PCS | Mod: S$GLB,,, | Performed by: RADIOLOGY

## 2020-10-27 PROCEDURE — 77336 RADIATION PHYSICS CONSULT: CPT | Mod: S$GLB,,, | Performed by: RADIOLOGY

## 2020-10-27 PROCEDURE — G6015 RADIATION TX DELIVERY IMRT: HCPCS | Mod: S$GLB,,, | Performed by: RADIOLOGY

## 2020-10-27 PROCEDURE — G6015 PR RADN TX DELIVERY,  INTENS MOD, 1+ FIELDS PER TX: ICD-10-PCS | Mod: S$GLB,,, | Performed by: RADIOLOGY

## 2020-10-27 PROCEDURE — 77014 PR  CT GUIDANCE PLACEMENT RAD THERAPY FIELDS: ICD-10-PCS | Mod: S$GLB,,, | Performed by: RADIOLOGY

## 2020-10-27 PROCEDURE — 77014 PR  CT GUIDANCE PLACEMENT RAD THERAPY FIELDS: CPT | Mod: S$GLB,,, | Performed by: RADIOLOGY

## 2020-10-27 RX ORDER — HEPARIN 100 UNIT/ML
500 SYRINGE INTRAVENOUS
Status: CANCELLED | OUTPATIENT
Start: 2020-10-28

## 2020-10-27 RX ORDER — SODIUM CHLORIDE 0.9 % (FLUSH) 0.9 %
10 SYRINGE (ML) INJECTION
Status: CANCELLED | OUTPATIENT
Start: 2020-10-28

## 2020-10-27 RX ORDER — FENTANYL 12.5 UG/1
1 PATCH TRANSDERMAL
Qty: 5 PATCH | Refills: 0 | Status: SHIPPED | OUTPATIENT
Start: 2020-10-27 | End: 2020-11-18 | Stop reason: SDUPTHER

## 2020-10-28 ENCOUNTER — INFUSION (OUTPATIENT)
Dept: INFUSION THERAPY | Facility: HOSPITAL | Age: 70
End: 2020-10-28
Attending: RADIOLOGY
Payer: MEDICARE

## 2020-10-28 VITALS
TEMPERATURE: 97 F | HEIGHT: 67 IN | OXYGEN SATURATION: 99 % | HEART RATE: 59 BPM | RESPIRATION RATE: 18 BRPM | SYSTOLIC BLOOD PRESSURE: 104 MMHG | WEIGHT: 163 LBS | BODY MASS INDEX: 25.58 KG/M2 | DIASTOLIC BLOOD PRESSURE: 59 MMHG

## 2020-10-28 DIAGNOSIS — E86.0 DEHYDRATION: Primary | ICD-10-CM

## 2020-10-28 PROCEDURE — 25000003 PHARM REV CODE 250: Performed by: RADIOLOGY

## 2020-10-28 PROCEDURE — 77427 RADIATION TX MANAGEMENT X5: CPT | Mod: S$GLB,,, | Performed by: RADIOLOGY

## 2020-10-28 PROCEDURE — 96360 HYDRATION IV INFUSION INIT: CPT

## 2020-10-28 PROCEDURE — 96361 HYDRATE IV INFUSION ADD-ON: CPT

## 2020-10-28 PROCEDURE — 77427 PR CHG RADIATION,MANGEMENT,5 TX'S: ICD-10-PCS | Mod: S$GLB,,, | Performed by: RADIOLOGY

## 2020-10-28 RX ORDER — HEPARIN 100 UNIT/ML
500 SYRINGE INTRAVENOUS
Status: CANCELLED | OUTPATIENT
Start: 2020-10-28

## 2020-10-28 RX ORDER — SODIUM CHLORIDE 0.9 % (FLUSH) 0.9 %
10 SYRINGE (ML) INJECTION
Status: CANCELLED | OUTPATIENT
Start: 2020-10-28

## 2020-10-28 RX ADMIN — SODIUM CHLORIDE 1000 ML: 0.9 INJECTION, SOLUTION INTRAVENOUS at 07:10

## 2020-10-28 NOTE — PLAN OF CARE
Problem: Fluid Volume Deficit  Goal: Fluid Balance  Outcome: Ongoing, Progressing  Intervention: Monitor and Manage Hypovolemia  Flowsheets (Taken 10/28/2020 0778)  Fluid/Electrolyte Management: intravenous fluid replacement initiated

## 2020-10-28 NOTE — PLAN OF CARE
Problem: Fluid Volume Deficit  Goal: Fluid Balance  Outcome: Ongoing, Progressing  Intervention: Monitor and Manage Hypovolemia  Flowsheets (Taken 10/28/2020 0764)  Fluid/Electrolyte Management: intravenous fluid replacement initiated

## 2020-10-30 ENCOUNTER — TREATMENT (OUTPATIENT)
Dept: RADIATION ONCOLOGY | Facility: CLINIC | Age: 70
End: 2020-10-30
Payer: MEDICARE

## 2020-10-30 ENCOUNTER — PATIENT MESSAGE (OUTPATIENT)
Dept: ADMINISTRATIVE | Facility: HOSPITAL | Age: 70
End: 2020-10-30

## 2020-10-30 PROCEDURE — 77014 PR  CT GUIDANCE PLACEMENT RAD THERAPY FIELDS: CPT | Mod: S$GLB,,, | Performed by: RADIOLOGY

## 2020-10-30 PROCEDURE — G6015 RADIATION TX DELIVERY IMRT: HCPCS | Mod: S$GLB,,, | Performed by: RADIOLOGY

## 2020-10-30 PROCEDURE — G6015 PR RADN TX DELIVERY,  INTENS MOD, 1+ FIELDS PER TX: ICD-10-PCS | Mod: S$GLB,,, | Performed by: RADIOLOGY

## 2020-10-30 PROCEDURE — 77014 PR  CT GUIDANCE PLACEMENT RAD THERAPY FIELDS: ICD-10-PCS | Mod: S$GLB,,, | Performed by: RADIOLOGY

## 2020-11-02 ENCOUNTER — INFUSION (OUTPATIENT)
Dept: INFUSION THERAPY | Facility: HOSPITAL | Age: 70
End: 2020-11-02
Attending: RADIOLOGY
Payer: MEDICARE

## 2020-11-02 ENCOUNTER — PATIENT MESSAGE (OUTPATIENT)
Dept: ENDOCRINOLOGY | Facility: CLINIC | Age: 70
End: 2020-11-02

## 2020-11-02 VITALS
RESPIRATION RATE: 18 BRPM | HEIGHT: 67 IN | OXYGEN SATURATION: 99 % | TEMPERATURE: 98 F | DIASTOLIC BLOOD PRESSURE: 80 MMHG | HEART RATE: 64 BPM | WEIGHT: 158.31 LBS | BODY MASS INDEX: 24.85 KG/M2 | SYSTOLIC BLOOD PRESSURE: 148 MMHG

## 2020-11-02 DIAGNOSIS — E86.0 DEHYDRATION: Primary | ICD-10-CM

## 2020-11-02 DIAGNOSIS — L58.9 RADIATION DERMATITIS: Primary | ICD-10-CM

## 2020-11-02 PROCEDURE — 25000003 PHARM REV CODE 250: Performed by: RADIOLOGY

## 2020-11-02 PROCEDURE — 96361 HYDRATE IV INFUSION ADD-ON: CPT

## 2020-11-02 PROCEDURE — 96360 HYDRATION IV INFUSION INIT: CPT

## 2020-11-02 RX ORDER — HEPARIN 100 UNIT/ML
500 SYRINGE INTRAVENOUS
Status: CANCELLED | OUTPATIENT
Start: 2020-11-02

## 2020-11-02 RX ORDER — SILVER SULFADIAZINE 10 G/1000G
CREAM TOPICAL 2 TIMES DAILY
Qty: 400 G | Refills: 2 | Status: SHIPPED | OUTPATIENT
Start: 2020-11-02 | End: 2021-03-09 | Stop reason: CLARIF

## 2020-11-02 RX ORDER — SODIUM CHLORIDE 0.9 % (FLUSH) 0.9 %
10 SYRINGE (ML) INJECTION
Status: CANCELLED | OUTPATIENT
Start: 2020-11-02

## 2020-11-02 RX ADMIN — SODIUM CHLORIDE 1000 ML: 0.9 INJECTION, SOLUTION INTRAVENOUS at 01:11

## 2020-11-02 NOTE — PLAN OF CARE
Problem: Fluid Volume Deficit  Goal: Fluid Balance  Outcome: Ongoing, Progressing  Intervention: Monitor and Manage Hypovolemia  Flowsheets (Taken 11/2/2020 1300)  Fluid/Electrolyte Management: intravenous fluid replacement initiated

## 2020-11-18 ENCOUNTER — LAB VISIT (OUTPATIENT)
Dept: LAB | Facility: HOSPITAL | Age: 70
End: 2020-11-18
Attending: PHYSICIAN ASSISTANT
Payer: MEDICARE

## 2020-11-18 DIAGNOSIS — E11.65 TYPE 2 DIABETES MELLITUS WITH HYPERGLYCEMIA, WITH LONG-TERM CURRENT USE OF INSULIN: ICD-10-CM

## 2020-11-18 DIAGNOSIS — C32.0 MALIGNANT NEOPLASM OF TRUE VOCAL CORD: ICD-10-CM

## 2020-11-18 DIAGNOSIS — E55.9 HYPOVITAMINOSIS D: ICD-10-CM

## 2020-11-18 DIAGNOSIS — Z79.4 TYPE 2 DIABETES MELLITUS WITH HYPERGLYCEMIA, WITH LONG-TERM CURRENT USE OF INSULIN: ICD-10-CM

## 2020-11-18 DIAGNOSIS — N52.9 ERECTILE DYSFUNCTION, UNSPECIFIED ERECTILE DYSFUNCTION TYPE: ICD-10-CM

## 2020-11-18 LAB
25(OH)D3+25(OH)D2 SERPL-MCNC: 38 NG/ML (ref 30–96)
ALBUMIN SERPL BCP-MCNC: 3.7 G/DL (ref 3.5–5.2)
ALP SERPL-CCNC: 67 U/L (ref 55–135)
ALT SERPL W/O P-5'-P-CCNC: 11 U/L (ref 10–44)
ANION GAP SERPL CALC-SCNC: 9 MMOL/L (ref 8–16)
AST SERPL-CCNC: 17 U/L (ref 10–40)
BILIRUB SERPL-MCNC: 1.2 MG/DL (ref 0.1–1)
BUN SERPL-MCNC: 10 MG/DL (ref 8–23)
CALCIUM SERPL-MCNC: 9.1 MG/DL (ref 8.7–10.5)
CHLORIDE SERPL-SCNC: 106 MMOL/L (ref 95–110)
CHOLEST SERPL-MCNC: 174 MG/DL (ref 120–199)
CHOLEST/HDLC SERPL: 4.7 {RATIO} (ref 2–5)
CO2 SERPL-SCNC: 28 MMOL/L (ref 23–29)
CREAT SERPL-MCNC: 1.3 MG/DL (ref 0.5–1.4)
EST. GFR  (AFRICAN AMERICAN): >60 ML/MIN/1.73 M^2
EST. GFR  (NON AFRICAN AMERICAN): 55.7 ML/MIN/1.73 M^2
ESTIMATED AVG GLUCOSE: 131 MG/DL (ref 68–131)
GLUCOSE SERPL-MCNC: 99 MG/DL (ref 70–110)
HBA1C MFR BLD HPLC: 6.2 % (ref 4–5.6)
HDLC SERPL-MCNC: 37 MG/DL (ref 40–75)
HDLC SERPL: 21.3 % (ref 20–50)
LDLC SERPL CALC-MCNC: 110.8 MG/DL (ref 63–159)
NONHDLC SERPL-MCNC: 137 MG/DL
POTASSIUM SERPL-SCNC: 4.1 MMOL/L (ref 3.5–5.1)
PROT SERPL-MCNC: 6.7 G/DL (ref 6–8.4)
SODIUM SERPL-SCNC: 143 MMOL/L (ref 136–145)
TRIGL SERPL-MCNC: 131 MG/DL (ref 30–150)

## 2020-11-18 PROCEDURE — 80061 LIPID PANEL: CPT

## 2020-11-18 PROCEDURE — 82040 ASSAY OF SERUM ALBUMIN: CPT

## 2020-11-18 PROCEDURE — 36415 COLL VENOUS BLD VENIPUNCTURE: CPT | Mod: PO

## 2020-11-18 PROCEDURE — 83036 HEMOGLOBIN GLYCOSYLATED A1C: CPT

## 2020-11-18 PROCEDURE — 80053 COMPREHEN METABOLIC PANEL: CPT

## 2020-11-18 PROCEDURE — 82306 VITAMIN D 25 HYDROXY: CPT

## 2020-11-18 RX ORDER — FENTANYL 12.5 UG/1
1 PATCH TRANSDERMAL
Qty: 5 PATCH | Refills: 0 | Status: SHIPPED | OUTPATIENT
Start: 2020-11-18 | End: 2020-12-08 | Stop reason: SDUPTHER

## 2020-11-18 RX ORDER — HYDROCODONE BITARTRATE AND ACETAMINOPHEN 7.5; 325 MG/15ML; MG/15ML
15 SOLUTION ORAL
Qty: 750 ML | Refills: 0 | Status: SHIPPED | OUTPATIENT
Start: 2020-11-18 | End: 2021-03-09 | Stop reason: CLARIF

## 2020-11-24 ENCOUNTER — OFFICE VISIT (OUTPATIENT)
Dept: RADIATION ONCOLOGY | Facility: CLINIC | Age: 70
End: 2020-11-24
Payer: MEDICARE

## 2020-11-24 VITALS — WEIGHT: 157.38 LBS | BODY MASS INDEX: 25.02 KG/M2

## 2020-11-24 DIAGNOSIS — Z03.818 ENCOUNTER FOR OBSERVATION FOR SUSPECTED EXPOSURE TO OTHER BIOLOGICAL AGENTS RULED OUT: ICD-10-CM

## 2020-11-24 DIAGNOSIS — C32.1 MALIGNANT NEOPLASM OF SUPRAGLOTTIS: Primary | ICD-10-CM

## 2020-11-24 LAB
ALBUMIN SERPL-MCNC: 3.7 G/DL (ref 3.6–5.1)
SHBG SERPL-SCNC: 105 NMOL/L (ref 22–77)
TESTOST FREE SERPL-MCNC: 37.5 PG/ML (ref 46–224)
TESTOST SERPL-MCNC: 761 NG/DL (ref 250–1100)
TESTOSTERONE.FREE+WB SERPL-MCNC: 64.1 NG/DL (ref 110–575)

## 2020-11-24 NOTE — PROGRESS NOTES
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 Batres completed IMRT to his larynx and bilateral necks via SiB totaling 6996 cGy on 10/30/2020.        Met w/ patient today for 3 week checkup.  He is doing very well and endorses full tolerance of solid and liquid PO intake.  He is only using hycet 1-2x per day, however does report continued thick pharyngeal mucous and xerostomia.    A/P  1.  PET/CT ordered for 3 months post-RT.  2.  Follow-up with ENT Q3 months for FFL.  3.  Return to clinic in 2-3 months after PET/CT.  4.  COVID-19 precautions discussed.  5.  Discussed importance of hydration and use of BS/salt mouth gargle

## 2020-12-01 ENCOUNTER — PATIENT MESSAGE (OUTPATIENT)
Dept: RHEUMATOLOGY | Facility: CLINIC | Age: 70
End: 2020-12-01

## 2020-12-02 ENCOUNTER — PATIENT OUTREACH (OUTPATIENT)
Dept: ADMINISTRATIVE | Facility: OTHER | Age: 70
End: 2020-12-02

## 2020-12-02 ENCOUNTER — OFFICE VISIT (OUTPATIENT)
Dept: OPTOMETRY | Facility: CLINIC | Age: 70
End: 2020-12-02
Payer: MEDICARE

## 2020-12-02 DIAGNOSIS — Z79.4 TYPE 2 DIABETES MELLITUS WITH HYPERGLYCEMIA, WITH LONG-TERM CURRENT USE OF INSULIN: ICD-10-CM

## 2020-12-02 DIAGNOSIS — E11.65 TYPE 2 DIABETES MELLITUS WITH HYPERGLYCEMIA, WITH LONG-TERM CURRENT USE OF INSULIN: ICD-10-CM

## 2020-12-02 DIAGNOSIS — H52.7 REFRACTIVE ERROR: ICD-10-CM

## 2020-12-02 DIAGNOSIS — E11.9 DIABETES MELLITUS TYPE 2 WITHOUT RETINOPATHY: Primary | ICD-10-CM

## 2020-12-02 DIAGNOSIS — H40.013 OPEN ANGLE WITH BORDERLINE FINDINGS OF BOTH EYES: ICD-10-CM

## 2020-12-02 DIAGNOSIS — E11.36 DIABETIC CATARACT: ICD-10-CM

## 2020-12-02 DIAGNOSIS — H25.13 NUCLEAR SCLEROSIS, BILATERAL: ICD-10-CM

## 2020-12-02 PROCEDURE — 2023F DILAT RTA XM W/O RTNOPTHY: CPT | Mod: S$GLB,,, | Performed by: OPTOMETRIST

## 2020-12-02 PROCEDURE — 92133 POSTERIOR SEGMENT OCT OPTIC NERVE(OCULAR COHERENCE TOMOGRAPHY) - OU - BOTH EYES: ICD-10-PCS | Mod: S$GLB,,, | Performed by: OPTOMETRIST

## 2020-12-02 PROCEDURE — 76514 ECHO EXAM OF EYE THICKNESS: CPT | Mod: S$GLB,,, | Performed by: OPTOMETRIST

## 2020-12-02 PROCEDURE — 1126F PR PAIN SEVERITY QUANTIFIED, NO PAIN PRESENT: ICD-10-PCS | Mod: S$GLB,,, | Performed by: OPTOMETRIST

## 2020-12-02 PROCEDURE — 92133 CPTRZD OPH DX IMG PST SGM ON: CPT | Mod: S$GLB,,, | Performed by: OPTOMETRIST

## 2020-12-02 PROCEDURE — 3288F FALL RISK ASSESSMENT DOCD: CPT | Mod: CPTII,S$GLB,, | Performed by: OPTOMETRIST

## 2020-12-02 PROCEDURE — 1101F PT FALLS ASSESS-DOCD LE1/YR: CPT | Mod: CPTII,S$GLB,, | Performed by: OPTOMETRIST

## 2020-12-02 PROCEDURE — 2023F PR DILATED RETINAL EXAM W/O EVID OF RETINOPATHY: ICD-10-PCS | Mod: S$GLB,,, | Performed by: OPTOMETRIST

## 2020-12-02 PROCEDURE — 92004 COMPRE OPH EXAM NEW PT 1/>: CPT | Mod: S$GLB,,, | Performed by: OPTOMETRIST

## 2020-12-02 PROCEDURE — 3288F PR FALLS RISK ASSESSMENT DOCUMENTED: ICD-10-PCS | Mod: CPTII,S$GLB,, | Performed by: OPTOMETRIST

## 2020-12-02 PROCEDURE — 1126F AMNT PAIN NOTED NONE PRSNT: CPT | Mod: S$GLB,,, | Performed by: OPTOMETRIST

## 2020-12-02 PROCEDURE — 99999 PR PBB SHADOW E&M-EST. PATIENT-LVL IV: ICD-10-PCS | Mod: PBBFAC,,, | Performed by: OPTOMETRIST

## 2020-12-02 PROCEDURE — 92015 PR REFRACTION: ICD-10-PCS | Mod: S$GLB,,, | Performed by: OPTOMETRIST

## 2020-12-02 PROCEDURE — 92015 DETERMINE REFRACTIVE STATE: CPT | Mod: S$GLB,,, | Performed by: OPTOMETRIST

## 2020-12-02 PROCEDURE — 1101F PR PT FALLS ASSESS DOC 0-1 FALLS W/OUT INJ PAST YR: ICD-10-PCS | Mod: CPTII,S$GLB,, | Performed by: OPTOMETRIST

## 2020-12-02 PROCEDURE — 76514 PR  US, EYE, FOR CORNEAL THICKNESS: ICD-10-PCS | Mod: S$GLB,,, | Performed by: OPTOMETRIST

## 2020-12-02 PROCEDURE — 92004 PR EYE EXAM, NEW PATIENT,COMPREHESV: ICD-10-PCS | Mod: S$GLB,,, | Performed by: OPTOMETRIST

## 2020-12-02 PROCEDURE — 99999 PR PBB SHADOW E&M-EST. PATIENT-LVL IV: CPT | Mod: PBBFAC,,, | Performed by: OPTOMETRIST

## 2020-12-02 NOTE — PROGRESS NOTES
HPI     69 YO male presents today for an annual diabetic eye exam. Patient states   that he has trouble reading small print or seeing small print at a   distance as well.     Hemoglobin A1C       Date                     Value               Ref Range             Status                11/18/2020               6.2 (H)             4.0 - 5.6 %           Final                  07/09/2020               5.4                 4.0 - 5.6 %           Final                  02/04/2020               5.8 (H)             4.0 - 5.6 %           Final                Last edited by Sangeeta Davis, PCT on 12/2/2020  8:28 AM. (History)            Assessment /Plan     For exam results, see Encounter Report.    Diabetes mellitus type 2 without retinopathy    Type 2 diabetes mellitus with hyperglycemia, with long-term current use of insulin  -     Ambulatory referral/consult to Ophthalmology    Diabetic cataract    Nuclear sclerosis, bilateral    Refractive error    Open angle with borderline findings of both eyes      DM type 2 w/o ocular retinopathy OU. Discussed possible ocular affects of uncontrolled blood sugar with patient. Recommended continued strong blood sugar control and continued care with PCP. Monitor yearly.     Mild to moderate cataracts OU, not yet visually significant. Discussed possible ocular affects of cataracts. Acceptable BCVA OU. Discussed treatment options. Surgery not recommended at this time. Monitor yearly.     Dispensed updated spectacle Rx. Discussed various spectacle lens options. Discussed adaptation period to new specs.     Glaucoma suspect secondary to larger than average CD ratio OU. Discussed with patient. Patient scheduled to RTC for HVF 24-2 [facility] FAST, will call with results.    (-) fam hx  (+) DM  (+) HTN  (-) sleep apnea    IOP 12/2/20:  OD 13 mmHg, OS 12 mmHg    OCT 12/2/20:   OD no rnfl thinning     OS no rnfl thinning    PACH 12/2/20        Pigmented spot lower right lid, longstanding,  10+ years per pt, no changes. Monitor yearly.       RTC for hvf 24-2 [facility] to call

## 2020-12-02 NOTE — LETTER
December 2, 2020      MICHELLE Silveira PA-C  [address]  [place] [address]           [facility] - Optometry  [address] [place] [address] [address]  [place] [address]  Phone: [phone]          Patient: Cyrus Batres Jr.   MR Number: [id number]   YOB: 1950   Date of Visit: 12/2/2020       Dear MICHELLE Silveira:    Thank you for referring Cyrus Batres to me for evaluation. Attached you will find relevant portions of my assessment and plan of care.    If you have questions, please do not hesitate to call me. I look forward to following Cyrus Batres along with you.    Sincerely,    Zhen Pham, OD    Enclosure  CC:  No Recipients    If you would like to receive this communication electronically, please contact [email or web address] or [phone] to request more information on [employer] access.    For providers and/or their staff who would like to refer a patient to [facility], please contact us through our one-stop-shop provider referral line, [facility] , at [phone].    If you feel you have received this communication in error or would no longer like to receive these types of communications, please e-mail [email or web address]

## 2020-12-04 ENCOUNTER — CLINICAL SUPPORT (OUTPATIENT)
Dept: OPHTHALMOLOGY | Facility: CLINIC | Age: 70
End: 2020-12-04
Payer: MEDICARE

## 2020-12-04 DIAGNOSIS — H40.013 OPEN ANGLE WITH BORDERLINE FINDINGS OF BOTH EYES: ICD-10-CM

## 2020-12-04 NOTE — PROGRESS NOTES
24-2 HVF done today. Pts test is good/ reliable. Needed no correcting.     Coverlet used w/ no latex allergies.   New Pt added to HVF

## 2020-12-08 DIAGNOSIS — C32.0 MALIGNANT NEOPLASM OF TRUE VOCAL CORD: ICD-10-CM

## 2020-12-08 RX ORDER — FENTANYL 12.5 UG/1
1 PATCH TRANSDERMAL
Qty: 5 PATCH | Refills: 0 | Status: SHIPPED | OUTPATIENT
Start: 2020-12-08 | End: 2021-03-09 | Stop reason: CLARIF

## 2020-12-09 ENCOUNTER — TELEPHONE (OUTPATIENT)
Dept: OPTOMETRY | Facility: CLINIC | Age: 70
End: 2020-12-09

## 2020-12-14 ENCOUNTER — HOSPITAL ENCOUNTER (OUTPATIENT)
Dept: RADIOLOGY | Facility: HOSPITAL | Age: 70
Discharge: HOME | End: 2020-12-14
Attending: RADIOLOGY
Payer: MEDICARE

## 2020-12-14 ENCOUNTER — CLINICAL SUPPORT (OUTPATIENT)
Dept: REHABILITATION | Facility: HOSPITAL | Age: 70
End: 2020-12-14
Payer: MEDICARE

## 2020-12-14 DIAGNOSIS — R13.13 DYSPHAGIA, PHARYNGEAL PHASE: ICD-10-CM

## 2020-12-14 DIAGNOSIS — R13.12 OROPHARYNGEAL DYSPHAGIA: Primary | ICD-10-CM

## 2020-12-14 DIAGNOSIS — C32.0 MALIGNANT NEOPLASM OF TRUE VOCAL CORD: ICD-10-CM

## 2020-12-14 PROCEDURE — 74230 FL MODIFIED BARIUM SWALLOW SPEECH STUDY: ICD-10-PCS | Mod: 26,,, | Performed by: RADIOLOGY

## 2020-12-14 PROCEDURE — 74230 X-RAY XM SWLNG FUNCJ C+: CPT | Mod: 26,,, | Performed by: RADIOLOGY

## 2020-12-14 PROCEDURE — 74230 X-RAY XM SWLNG FUNCJ C+: CPT | Mod: TC

## 2020-12-14 PROCEDURE — 92611 MOTION FLUOROSCOPY/SWALLOW: CPT

## 2020-12-14 PROCEDURE — 92610 EVALUATE SWALLOWING FUNCTION: CPT | Mod: PN

## 2020-12-14 PROCEDURE — 92611 MOTION FLUOROSCOPY/SWALLOW: CPT | Mod: PN

## 2020-12-14 NOTE — PLAN OF CARE
Outpatient Neurological Rehabilitation  MODIFIED BARIUM SWALLOW STUDY      Date: 12/14/2020     Name: Cyrus Batres Jr.   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   Swallow and Oral Function Evaluation   15   Fl Modified Barium Swallow Speech  15     Precautions: Standard    Subjective   Date of Onset: October 2020    Past Medical History: Cyrus Batres Jr.  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  Cyrus Batres Jr.  has a past surgical history that includes Microlaryngoscopy (N/A, 3/17/2020) and Laryngoscopy (N/A, 8/4/2020).    The patient is a 70 y.o. male who complains of difficulty swallowing liquids, difficulty swallowing pills and unintentional weight loss.     The Pt gave the following history:   -Current diet at home: Unrestricted (IDDSI 0/7)    -Recommended diet from previous study: n/a  -Therapy received: n/a  -Neuro: Pt denied any neurological diagnoses.  -GI: Pt denied any GI diagnoses.    -Pulmonary: Pt denied any pulmonary diagnoses.   -Cancer: Pt has been treated for a malignant neoplasm of the true vocal fold with radiation, completed in October 2020.    The following observations were made:   -Mental status: Alert and Cooperative  -Factors affecting performance: no difficulties participating in the study  -Feeding Method: independent in self-feeding  -Respiratory Status: room air    Medical Hx and Allergies:    Review of patient's allergies indicates:   Allergen Reactions    Pollen extracts     Lovastatin Rash     Not confirmed but pt skeptical       Pain Scale:   0/10 on VAS currently.   Pain Location: n/a    Objective     Modified Barium Swallow Study  A modified barium swallow study was ordered to objectively evaluate the safety and efficiency of the patients swallowing and to rule out aspiration.      The patient was seen in radiology seated in High Morel's position in a video imaging chair for lateral views of the larynx and an A/P view. The study was conducted using Varibar thin liquid (IDDSI 0), Varibar nectar liquid (IDDSI 2), Varibar pudding (IDDSI 4), Peaches covered in Varibar powder (IDDSI 6/2) and solid coated in Varibar pudding (IDDSI 7). He tolerated the procedure well.     A cranial nerve examination revealed the following:  Cranial Nerve 5: Trigeminal Nerve  Motor Jaw Posture at rest: Closed  Mandible Elevation/Depression: WFL  Mandible lateralization: WFL  Teeth Clenched: WFL  Abnormal movement: absent Interpretation: Intact b/l     Sensory Forehead: WFL  Cheek: WFL  Jaw: WFL  Facial Pain: None noted Interpretation: Intact b/l       Cranial Nerve 7: Facial Nerve  Motor Facial Symmetry: WNL  Wrinkle Forehead: WFL  Close eyes tightly: WFL  Labial Protrusion: WFL  Labial Retraction: WFL  Abnormal movement: absent Interpretation: Intact b/l     Sensory Formal testing not completed. Pt denied any changes in taste      Cranial Nerves IX and X: Glossopharyngeal and Vagus Nerves  Motor Palatal Symmetry (Rest): WNL  Palatal Symmetry (Movement): WNL  Cough: Perceptually strong  Voice Prior to PO intake: weak Interpretation: Intact b/l      Cranial Nerve XII: Hypoglossal Nerve  Motor Tongue at rest: WNL  Lingual Protrusion: WNL  Lingual Protrusion against Resistance: WNL  Lingual Lateralization: WNL Interpretation: Intact b/l      Other information:   Volitional Swallow: Able to palpate laryngeal rise   Mucosal Quality: Xerostomia   Dentition: Scattered Dentition and Teeth in poor condition      CONSISTENCIES ADMINISTERED:  Thin Liquids (IDDSI 0):   Mode and  volume administered: 5ml x2, 10 ml x2, self-regulated cup sip x2, self-regulated straw sip x1, rapid consecutive straw sip x1   Oral Residue: none to trace    Vallecular Residue: none to mild    Pyriform Sinus Residue: none to trace    All boluses were piecmealed   Rosenbeck's 8-Point Penetration-Aspiration Scale:   o Best: (1) Material does not enter the airway  o Worst: (4) Material enters the airway, contacts the vocal folds, and is ejected from the airway  - penetration occurred after the swallow in residuals from the pyriform sinuses  - Immediately and fully cleared from the laryngeal vestibule    Nectar Thick Liquids (IDDSI 2):   Mode and volume administered: 5ml x2, 10 ml x2   Oral Residue: none to trace   Vallecular Residue: trace to mild   Pyriform Sinus Residue: trace    All boluses were piecmealed   Rosenbeck's 8-Point Penetration-Aspiration Scale: (1) Material does not enter the airway    Puree (IDDSI 4):   Mode and volume administered: 5ml x2, 10 ml x1   Oral Residue: trace to mild   Vallecular Residue: trace    Pyriform Sinus Residue: trace    All boluses were piecmealed   Rosenbeck's 8-Point Penetration-Aspiration Scale: (1) Material does not enter the airway    Mixed Consistency Bolus (IDDSI 6 with IDDSI 2):   Mode and volume administered: 2 peaches in juice x2   Oral Residue: trace    Vallecular Residue: trace    Pyriform Sinus Residue: trace    All boluses were piecmealed   Rosenbeck's 8-Point Penetration-Aspiration Scale:   o Best: (1) Material does not enter the airway  o Worst: (2) Material enters the airway, remains above the vocal folds, and is ejected from the airway  - penetration occurred during the swallow.  -   Regular (IDDSI 7):   Mode and volume administered: 1/3 Layla doone cookie in barium pudding x2   Oral Residue: trace to mild   Vallecular Residue: none    Pyriform Sinus Residue: trace    All boluses were piecmealed   Rosenbeck's 8-Point  Penetration-Aspiration Scale: (1) Material does not enter the airway    Treatment   Treatment Time In: 1030  Treatment Time Out: 1040  Total Treatment Time: 10 minutes  Pt educated regarding results and recommendations of the evaluation. See the recommendations section below.    Discussed food sticking during the swallow and the relation to dry mouth. Discussed recommendations at length with the patient including a dry mouth rinse for decreasing that sensation. Pill dysphagia discussed at length as patient refused pill trials during study.      Education: SLP role in care, Plan of care, Results of the study and Recommendations were discussed with the patient. Patient expressed understanding.     Assessment     Cyrus Batres Jr. is a 70 y.o. 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     Oral Phase: Lip closure was complete with no labial escape.  Bolus prep and mastication was prolonged with complete recollection.  Lingual motion was delayed. The swallow was initiated when the head of the bolus entered the vallecula.    Pharyngeal Phase:  The soft palate elevated for complete closure of the velopharyngeal port. Tongue base retraction was mildly reduced.  Epiglottic inversion appeared to be complete. Anterior hyoid excursion was complete. Laryngeal elevation was complete. There was transient penetration noted on thin and mixed consistency boluses.  There was no aspiration observed in this study.  Pharyngeal stripping wave appeared present and complete. The PES opening was mildly reduced.      Esophageal Phase: On esophageal screen, No deficits were noted.    Dysphagia Outcome and Severity Scale (MINDI): Level 5: Mild Dysphagia    Impressions: mild oropharyngeal dysphagia, likely chronic related to s/p radiation treatment. Pt appears to be at low risk for aspiration related PNA 2/2 to good airway clearance and  sensation. .    Recommendations:      Consistency Recommendations: thin liquids (IDDSI 0) and regularconsistencies (IDDSI 7).  Medications should be taken whole in puree to in thin liquids as tolerated.    Risk Management: use good oral hygiene , sit upright for all PO intake and increase physical mobility as tolerated   Specialist Referrals: ENT per scheduled follow up  o Ambulatory referral to Dietician     Please contact [facility] at [phone] if there are questions re: the above or if we can be of additional service to this patient.    Therapist's Name:   Susan Gil M.A. CCC-SLP  Speech Language Pathologist    Date: 12/14/2020

## 2020-12-14 NOTE — PROGRESS NOTES
See Modified Barium Swallow Study results in Plan of Care.     Susan Gil M.A. CCC-SLP  Speech Language Pathologist  12/14/2020

## 2020-12-28 ENCOUNTER — LAB VISIT (OUTPATIENT)
Dept: PRIMARY CARE CLINIC | Facility: CLINIC | Age: 70
End: 2020-12-28
Payer: MEDICARE

## 2020-12-28 ENCOUNTER — PATIENT MESSAGE (OUTPATIENT)
Dept: CARDIOLOGY | Facility: CLINIC | Age: 70
End: 2020-12-28

## 2020-12-28 DIAGNOSIS — Z03.818 ENCOUNTER FOR OBSERVATION FOR SUSPECTED EXPOSURE TO OTHER BIOLOGICAL AGENTS RULED OUT: ICD-10-CM

## 2020-12-28 PROCEDURE — U0003 INFECTIOUS AGENT DETECTION BY NUCLEIC ACID (DNA OR RNA); SEVERE ACUTE RESPIRATORY SYNDROME CORONAVIRUS 2 (SARS-COV-2) (CORONAVIRUS DISEASE [COVID-19]), AMPLIFIED PROBE TECHNIQUE, MAKING USE OF HIGH THROUGHPUT TECHNOLOGIES AS DESCRIBED BY CMS-2020-01-R: HCPCS

## 2020-12-29 LAB — SARS-COV-2 RNA RESP QL NAA+PROBE: NOT DETECTED

## 2020-12-29 NOTE — PROGRESS NOTES
[facility]  Department of Otorhinolaryngology and Communication Sciences    Subjective:      Cyrus Batres Jr. is a 70 y.o. male who presents for follow-up. He has T2N0M0 glttic SCC.    TREATMENT HISTORY:  10/30/2020: completion of IMRT to larynx and bilateral necks via SiB totaling 6996 cGy     MBSS 12/14/2020: occasional small volume penetration    He had a tough timeRecovered his voice about 1 mo ago. Reports difficulty swallowing pills, but this is getting better. Was also having difficulty wth solid food but this is getting better. He denies throat pain, odynophagia, otalgia, hemoptysis, hematemesis, neck mass.    He had a post-treatment PET ordered, but this is not yet scheduled.    The patient's medications, allergies, past medical, surgical, social and family histories were reviewed and updated as appropriate.    A detailed review of systems was obtained with pertinent positives as per the above HPI, and otherwise negative.      Objective:     BP (!) 147/80 (Patient Position: Sitting)   Pulse 73      Constitutional: comfortable, well dressed  Psychiatric: appropriate affect  Respiratory: comfortably breathing, symmetric chest rise, no stridor  Voice: mild-moderately pressed/strained  Head: normocephalic  Eyes: conjunctivae and sclerae clear  Indirect laryngoscopy is limited due to gag    Procedure  Flexible Laryngeal Videostroboscopy (00348): Laryngeal videostroboscopy is indicated to assess laryngeal vibratory biomechanics and vocal fold oscillation, which cannot be assessed with a plain light examination. This was carried out transnasally with a distal chip videoendoscope. After verbal consent was obtained, the patient was positioned and the nose was topically decongested with 1% phenylephrine and topically anesthetized with 4% lidocaine. The endoscope was passed through the most patent nasal cavity and positioned to image the nasopharynx, larynx, and hypopharynx in detail. The following  features were examined: nasopharyngeal, laryngeal, hypopharyngeal masses; velopharyngeal strength, closure, and symmetry of motion; vocal fold range and symmetry of motion; laryngeal mucosal edema, erythema, inflammation, and hydration; salivary pooling; and gross laryngeal sensation. During phonation, the vocal folds were assessed for glottal closure; mucosal wave; vocal fold lesions; vibratory periodicity, amplitude, and phase symmetry; and vertical height match. The equipment was removed. The patient tolerated the procedure well without complication. All findings were normal except:  - mild convexity of the left true vocal fold with premature contact, no mucosal wave propagation, mild anterior glottic web    Representative image:          Assessment:     Cyrus Batres Jr. is a 70 y.o. male with a history of T2N0M0 SCC of the glottic larynx.    He demonstrates mild endolaryngeal convexity, but I suspect this represents treatment involution moreso than persistent disease.     Plan:     I recommended he complete his post-treatment PET/CT as planned.    He will follow up with me in about 1 month.    All questions were answered, and the patient is in agreement with the plan.    Stew Noel M.D.  [facility]  Department of Otorhinolaryngology and Communication Sciences

## 2020-12-30 ENCOUNTER — OFFICE VISIT (OUTPATIENT)
Dept: OTOLARYNGOLOGY | Facility: CLINIC | Age: 70
End: 2020-12-30
Payer: MEDICARE

## 2020-12-30 VITALS — SYSTOLIC BLOOD PRESSURE: 147 MMHG | HEART RATE: 73 BPM | DIASTOLIC BLOOD PRESSURE: 80 MMHG

## 2020-12-30 DIAGNOSIS — R49.0 DYSPHONIA: ICD-10-CM

## 2020-12-30 DIAGNOSIS — C32.9 LARYNX NEOPLASM MALIGNANT: Primary | ICD-10-CM

## 2020-12-30 DIAGNOSIS — Z03.818 ENCOUNTER FOR OBSERVATION FOR SUSPECTED EXPOSURE TO OTHER BIOLOGICAL AGENTS RULED OUT: ICD-10-CM

## 2020-12-30 PROCEDURE — 99213 PR OFFICE/OUTPT VISIT, EST, LEVL III, 20-29 MIN: ICD-10-PCS | Mod: 25,S$GLB,, | Performed by: OTOLARYNGOLOGY

## 2020-12-30 PROCEDURE — 3079F PR MOST RECENT DIASTOLIC BLOOD PRESSURE 80-89 MM HG: ICD-10-PCS | Mod: CPTII,S$GLB,, | Performed by: OTOLARYNGOLOGY

## 2020-12-30 PROCEDURE — 31579 PR LARYNGOSCOPY, FLEX/RIGID TELESCOPIC, W/STROBOSCOPY: ICD-10-PCS | Mod: S$GLB,,, | Performed by: OTOLARYNGOLOGY

## 2020-12-30 PROCEDURE — 1126F AMNT PAIN NOTED NONE PRSNT: CPT | Mod: S$GLB,,, | Performed by: OTOLARYNGOLOGY

## 2020-12-30 PROCEDURE — 31579 LARYNGOSCOPY TELESCOPIC: CPT | Mod: S$GLB,,, | Performed by: OTOLARYNGOLOGY

## 2020-12-30 PROCEDURE — 1159F PR MEDICATION LIST DOCUMENTED IN MEDICAL RECORD: ICD-10-PCS | Mod: S$GLB,,, | Performed by: OTOLARYNGOLOGY

## 2020-12-30 PROCEDURE — 3077F SYST BP >= 140 MM HG: CPT | Mod: CPTII,S$GLB,, | Performed by: OTOLARYNGOLOGY

## 2020-12-30 PROCEDURE — 99999 PR PBB SHADOW E&M-EST. PATIENT-LVL V: ICD-10-PCS | Mod: PBBFAC,,, | Performed by: OTOLARYNGOLOGY

## 2020-12-30 PROCEDURE — 3079F DIAST BP 80-89 MM HG: CPT | Mod: CPTII,S$GLB,, | Performed by: OTOLARYNGOLOGY

## 2020-12-30 PROCEDURE — 1126F PR PAIN SEVERITY QUANTIFIED, NO PAIN PRESENT: ICD-10-PCS | Mod: S$GLB,,, | Performed by: OTOLARYNGOLOGY

## 2020-12-30 PROCEDURE — 99213 OFFICE O/P EST LOW 20 MIN: CPT | Mod: 25,S$GLB,, | Performed by: OTOLARYNGOLOGY

## 2020-12-30 PROCEDURE — 99999 PR PBB SHADOW E&M-EST. PATIENT-LVL V: CPT | Mod: PBBFAC,,, | Performed by: OTOLARYNGOLOGY

## 2020-12-30 PROCEDURE — 3077F PR MOST RECENT SYSTOLIC BLOOD PRESSURE >= 140 MM HG: ICD-10-PCS | Mod: CPTII,S$GLB,, | Performed by: OTOLARYNGOLOGY

## 2020-12-30 PROCEDURE — 1159F MED LIST DOCD IN RCRD: CPT | Mod: S$GLB,,, | Performed by: OTOLARYNGOLOGY

## 2021-01-03 ENCOUNTER — PATIENT OUTREACH (OUTPATIENT)
Dept: ADMINISTRATIVE | Facility: OTHER | Age: 71
End: 2021-01-03

## 2021-01-04 ENCOUNTER — PATIENT MESSAGE (OUTPATIENT)
Dept: ADMINISTRATIVE | Facility: HOSPITAL | Age: 71
End: 2021-01-04

## 2021-01-04 ENCOUNTER — OFFICE VISIT (OUTPATIENT)
Dept: ENDOCRINOLOGY | Facility: CLINIC | Age: 71
End: 2021-01-04
Payer: MEDICARE

## 2021-01-04 VITALS
WEIGHT: 152.44 LBS | HEART RATE: 63 BPM | SYSTOLIC BLOOD PRESSURE: 120 MMHG | OXYGEN SATURATION: 98 % | DIASTOLIC BLOOD PRESSURE: 70 MMHG | HEIGHT: 67 IN | TEMPERATURE: 98 F | BODY MASS INDEX: 23.93 KG/M2

## 2021-01-04 DIAGNOSIS — E11.65 TYPE 2 DIABETES MELLITUS WITH HYPERGLYCEMIA, WITH LONG-TERM CURRENT USE OF INSULIN: Primary | ICD-10-CM

## 2021-01-04 DIAGNOSIS — Z79.4 TYPE 2 DIABETES MELLITUS WITH HYPERGLYCEMIA, WITH LONG-TERM CURRENT USE OF INSULIN: Primary | ICD-10-CM

## 2021-01-04 DIAGNOSIS — B35.1 ONYCHOMYCOSIS: ICD-10-CM

## 2021-01-04 DIAGNOSIS — N52.9 ERECTILE DYSFUNCTION, UNSPECIFIED ERECTILE DYSFUNCTION TYPE: ICD-10-CM

## 2021-01-04 DIAGNOSIS — Z78.9 STATIN INTOLERANCE: ICD-10-CM

## 2021-01-04 DIAGNOSIS — I10 BENIGN HYPERTENSION: ICD-10-CM

## 2021-01-04 DIAGNOSIS — E78.5 HYPERLIPIDEMIA, UNSPECIFIED HYPERLIPIDEMIA TYPE: ICD-10-CM

## 2021-01-04 DIAGNOSIS — E55.9 HYPOVITAMINOSIS D: ICD-10-CM

## 2021-01-04 PROCEDURE — 1126F PR PAIN SEVERITY QUANTIFIED, NO PAIN PRESENT: ICD-10-PCS | Mod: S$GLB,,, | Performed by: PHYSICIAN ASSISTANT

## 2021-01-04 PROCEDURE — 99999 PR PBB SHADOW E&M-EST. PATIENT-LVL V: ICD-10-PCS | Mod: PBBFAC,,, | Performed by: PHYSICIAN ASSISTANT

## 2021-01-04 PROCEDURE — 1101F PR PT FALLS ASSESS DOC 0-1 FALLS W/OUT INJ PAST YR: ICD-10-PCS | Mod: CPTII,S$GLB,, | Performed by: PHYSICIAN ASSISTANT

## 2021-01-04 PROCEDURE — 3044F HG A1C LEVEL LT 7.0%: CPT | Mod: CPTII,S$GLB,, | Performed by: PHYSICIAN ASSISTANT

## 2021-01-04 PROCEDURE — 3008F PR BODY MASS INDEX (BMI) DOCUMENTED: ICD-10-PCS | Mod: CPTII,S$GLB,, | Performed by: PHYSICIAN ASSISTANT

## 2021-01-04 PROCEDURE — 3074F SYST BP LT 130 MM HG: CPT | Mod: CPTII,S$GLB,, | Performed by: PHYSICIAN ASSISTANT

## 2021-01-04 PROCEDURE — 1159F PR MEDICATION LIST DOCUMENTED IN MEDICAL RECORD: ICD-10-PCS | Mod: S$GLB,,, | Performed by: PHYSICIAN ASSISTANT

## 2021-01-04 PROCEDURE — 1126F AMNT PAIN NOTED NONE PRSNT: CPT | Mod: S$GLB,,, | Performed by: PHYSICIAN ASSISTANT

## 2021-01-04 PROCEDURE — 3044F PR MOST RECENT HEMOGLOBIN A1C LEVEL <7.0%: ICD-10-PCS | Mod: CPTII,S$GLB,, | Performed by: PHYSICIAN ASSISTANT

## 2021-01-04 PROCEDURE — 3288F PR FALLS RISK ASSESSMENT DOCUMENTED: ICD-10-PCS | Mod: CPTII,S$GLB,, | Performed by: PHYSICIAN ASSISTANT

## 2021-01-04 PROCEDURE — 99214 OFFICE O/P EST MOD 30 MIN: CPT | Mod: S$GLB,,, | Performed by: PHYSICIAN ASSISTANT

## 2021-01-04 PROCEDURE — 3072F LOW RISK FOR RETINOPATHY: CPT | Mod: S$GLB,,, | Performed by: PHYSICIAN ASSISTANT

## 2021-01-04 PROCEDURE — 99214 PR OFFICE/OUTPT VISIT, EST, LEVL IV, 30-39 MIN: ICD-10-PCS | Mod: S$GLB,,, | Performed by: PHYSICIAN ASSISTANT

## 2021-01-04 PROCEDURE — 3078F PR MOST RECENT DIASTOLIC BLOOD PRESSURE < 80 MM HG: ICD-10-PCS | Mod: CPTII,S$GLB,, | Performed by: PHYSICIAN ASSISTANT

## 2021-01-04 PROCEDURE — 1159F MED LIST DOCD IN RCRD: CPT | Mod: S$GLB,,, | Performed by: PHYSICIAN ASSISTANT

## 2021-01-04 PROCEDURE — 3072F PR LOW RISK FOR RETINOPATHY: ICD-10-PCS | Mod: S$GLB,,, | Performed by: PHYSICIAN ASSISTANT

## 2021-01-04 PROCEDURE — 3008F BODY MASS INDEX DOCD: CPT | Mod: CPTII,S$GLB,, | Performed by: PHYSICIAN ASSISTANT

## 2021-01-04 PROCEDURE — 3074F PR MOST RECENT SYSTOLIC BLOOD PRESSURE < 130 MM HG: ICD-10-PCS | Mod: CPTII,S$GLB,, | Performed by: PHYSICIAN ASSISTANT

## 2021-01-04 PROCEDURE — 99999 PR PBB SHADOW E&M-EST. PATIENT-LVL V: CPT | Mod: PBBFAC,,, | Performed by: PHYSICIAN ASSISTANT

## 2021-01-04 PROCEDURE — 3288F FALL RISK ASSESSMENT DOCD: CPT | Mod: CPTII,S$GLB,, | Performed by: PHYSICIAN ASSISTANT

## 2021-01-04 PROCEDURE — 3078F DIAST BP <80 MM HG: CPT | Mod: CPTII,S$GLB,, | Performed by: PHYSICIAN ASSISTANT

## 2021-01-04 PROCEDURE — 1101F PT FALLS ASSESS-DOCD LE1/YR: CPT | Mod: CPTII,S$GLB,, | Performed by: PHYSICIAN ASSISTANT

## 2021-01-13 ENCOUNTER — OFFICE VISIT (OUTPATIENT)
Dept: CARDIOLOGY | Facility: CLINIC | Age: 71
End: 2021-01-13
Payer: MEDICARE

## 2021-01-13 VITALS
OXYGEN SATURATION: 100 % | WEIGHT: 152 LBS | RESPIRATION RATE: 16 BRPM | DIASTOLIC BLOOD PRESSURE: 68 MMHG | BODY MASS INDEX: 23.86 KG/M2 | HEART RATE: 79 BPM | HEIGHT: 67 IN | SYSTOLIC BLOOD PRESSURE: 118 MMHG

## 2021-01-13 DIAGNOSIS — I47.29 NSVT (NONSUSTAINED VENTRICULAR TACHYCARDIA): ICD-10-CM

## 2021-01-13 DIAGNOSIS — I48.0 PAROXYSMAL ATRIAL FIBRILLATION: Primary | ICD-10-CM

## 2021-01-13 DIAGNOSIS — E78.5 HYPERLIPIDEMIA, UNSPECIFIED HYPERLIPIDEMIA TYPE: ICD-10-CM

## 2021-01-13 DIAGNOSIS — I10 BENIGN HYPERTENSION: ICD-10-CM

## 2021-01-13 PROCEDURE — 1126F AMNT PAIN NOTED NONE PRSNT: CPT | Mod: S$GLB,,, | Performed by: INTERNAL MEDICINE

## 2021-01-13 PROCEDURE — 3008F BODY MASS INDEX DOCD: CPT | Mod: CPTII,S$GLB,, | Performed by: INTERNAL MEDICINE

## 2021-01-13 PROCEDURE — 3072F LOW RISK FOR RETINOPATHY: CPT | Mod: S$GLB,,, | Performed by: INTERNAL MEDICINE

## 2021-01-13 PROCEDURE — 3074F SYST BP LT 130 MM HG: CPT | Mod: CPTII,S$GLB,, | Performed by: INTERNAL MEDICINE

## 2021-01-13 PROCEDURE — 1126F PR PAIN SEVERITY QUANTIFIED, NO PAIN PRESENT: ICD-10-PCS | Mod: S$GLB,,, | Performed by: INTERNAL MEDICINE

## 2021-01-13 PROCEDURE — 99214 PR OFFICE/OUTPT VISIT, EST, LEVL IV, 30-39 MIN: ICD-10-PCS | Mod: S$GLB,,, | Performed by: INTERNAL MEDICINE

## 2021-01-13 PROCEDURE — 99214 OFFICE O/P EST MOD 30 MIN: CPT | Mod: S$GLB,,, | Performed by: INTERNAL MEDICINE

## 2021-01-13 PROCEDURE — 3074F PR MOST RECENT SYSTOLIC BLOOD PRESSURE < 130 MM HG: ICD-10-PCS | Mod: CPTII,S$GLB,, | Performed by: INTERNAL MEDICINE

## 2021-01-13 PROCEDURE — 3078F DIAST BP <80 MM HG: CPT | Mod: CPTII,S$GLB,, | Performed by: INTERNAL MEDICINE

## 2021-01-13 PROCEDURE — 3008F PR BODY MASS INDEX (BMI) DOCUMENTED: ICD-10-PCS | Mod: CPTII,S$GLB,, | Performed by: INTERNAL MEDICINE

## 2021-01-13 PROCEDURE — 3078F PR MOST RECENT DIASTOLIC BLOOD PRESSURE < 80 MM HG: ICD-10-PCS | Mod: CPTII,S$GLB,, | Performed by: INTERNAL MEDICINE

## 2021-01-13 PROCEDURE — 1159F MED LIST DOCD IN RCRD: CPT | Mod: S$GLB,,, | Performed by: INTERNAL MEDICINE

## 2021-01-13 PROCEDURE — 3072F PR LOW RISK FOR RETINOPATHY: ICD-10-PCS | Mod: S$GLB,,, | Performed by: INTERNAL MEDICINE

## 2021-01-13 PROCEDURE — 1159F PR MEDICATION LIST DOCUMENTED IN MEDICAL RECORD: ICD-10-PCS | Mod: S$GLB,,, | Performed by: INTERNAL MEDICINE

## 2021-01-13 RX ORDER — DILTIAZEM HYDROCHLORIDE EXTENDED-RELEASE TABLETS 120 MG/1
120 TABLET, EXTENDED RELEASE ORAL DAILY
Qty: 30 TABLET | Refills: 11 | Status: SHIPPED | OUTPATIENT
Start: 2021-01-13 | End: 2021-01-27 | Stop reason: SDUPTHER

## 2021-01-19 ENCOUNTER — HOSPITAL ENCOUNTER (OUTPATIENT)
Dept: RADIOLOGY | Facility: HOSPITAL | Age: 71
Discharge: HOME | End: 2021-01-19
Attending: RADIOLOGY
Payer: MEDICARE

## 2021-01-19 VITALS — HEIGHT: 66 IN | WEIGHT: 145 LBS | BODY MASS INDEX: 23.3 KG/M2

## 2021-01-19 DIAGNOSIS — C32.1 MALIGNANT NEOPLASM OF SUPRAGLOTTIS: ICD-10-CM

## 2021-01-19 LAB — GLUCOSE SERPL-MCNC: 116 MG/DL (ref 70–110)

## 2021-01-19 PROCEDURE — 78815 PET IMAGE W/CT SKULL-THIGH: CPT | Mod: TC,PO

## 2021-01-21 ENCOUNTER — PATIENT MESSAGE (OUTPATIENT)
Dept: FAMILY MEDICINE | Facility: CLINIC | Age: 71
End: 2021-01-21

## 2021-01-25 ENCOUNTER — TELEPHONE (OUTPATIENT)
Dept: RADIATION ONCOLOGY | Facility: CLINIC | Age: 71
End: 2021-01-25

## 2021-01-25 ENCOUNTER — TELEPHONE (OUTPATIENT)
Dept: CARDIOLOGY | Facility: CLINIC | Age: 71
End: 2021-01-25

## 2021-01-25 DIAGNOSIS — C76.0 MALIGNANT NEOPLASM OF HEAD, FACE AND NECK: ICD-10-CM

## 2021-01-25 DIAGNOSIS — C32.1 MALIGNANT NEOPLASM OF SUPRAGLOTTIS: Primary | ICD-10-CM

## 2021-01-28 RX ORDER — DILTIAZEM HYDROCHLORIDE EXTENDED-RELEASE TABLETS 120 MG/1
120 TABLET, EXTENDED RELEASE ORAL DAILY
Qty: 30 TABLET | Refills: 11 | Status: SHIPPED | OUTPATIENT
Start: 2021-01-28 | End: 2021-01-28

## 2021-01-29 ENCOUNTER — TELEPHONE (OUTPATIENT)
Dept: CARDIOLOGY | Facility: CLINIC | Age: 71
End: 2021-01-29

## 2021-01-29 NOTE — TELEPHONE ENCOUNTER
----- Message from Brenna Romero sent at 1/29/2021  8:29 AM CST -----  Regarding: medication  KASSIE GONZLAEZ calling regarding Refills (message) for #Call from [employer] that the Cardizem LA is non formulary. Will need to change to Cardizem ER Capsules for coverage. Please send new Rx to [facility] pharmacy CALL [facility] [phone]  EXT [phone] SEND ELECTRONICALLY

## 2021-01-29 NOTE — TELEPHONE ENCOUNTER
Informed patient he has never taking cardizem, He is taking Metoprolol tartrate but patient said it gives him ED. Patient wants to change to Cardizem ER since its on formulary. Informed patient I would speak with Dr. GRANT and let him know if that is fine to change.

## 2021-01-31 ENCOUNTER — LAB VISIT (OUTPATIENT)
Dept: PRIMARY CARE CLINIC | Facility: CLINIC | Age: 71
End: 2021-01-31
Payer: MEDICARE

## 2021-01-31 DIAGNOSIS — Z03.818 ENCOUNTER FOR OBSERVATION FOR SUSPECTED EXPOSURE TO OTHER BIOLOGICAL AGENTS RULED OUT: ICD-10-CM

## 2021-01-31 PROCEDURE — U0003 INFECTIOUS AGENT DETECTION BY NUCLEIC ACID (DNA OR RNA); SEVERE ACUTE RESPIRATORY SYNDROME CORONAVIRUS 2 (SARS-COV-2) (CORONAVIRUS DISEASE [COVID-19]), AMPLIFIED PROBE TECHNIQUE, MAKING USE OF HIGH THROUGHPUT TECHNOLOGIES AS DESCRIBED BY CMS-2020-01-R: HCPCS

## 2021-02-01 LAB — SARS-COV-2 RNA RESP QL NAA+PROBE: NOT DETECTED

## 2021-02-03 ENCOUNTER — PATIENT MESSAGE (OUTPATIENT)
Dept: OPTOMETRY | Facility: CLINIC | Age: 71
End: 2021-02-03

## 2021-02-03 ENCOUNTER — OFFICE VISIT (OUTPATIENT)
Dept: OTOLARYNGOLOGY | Facility: CLINIC | Age: 71
End: 2021-02-03
Payer: MEDICARE

## 2021-02-03 VITALS — SYSTOLIC BLOOD PRESSURE: 122 MMHG | HEART RATE: 71 BPM | DIASTOLIC BLOOD PRESSURE: 74 MMHG

## 2021-02-03 DIAGNOSIS — Z03.818 ENCOUNTER FOR OBSERVATION FOR SUSPECTED EXPOSURE TO OTHER BIOLOGICAL AGENTS RULED OUT: ICD-10-CM

## 2021-02-03 DIAGNOSIS — R49.0 DYSPHONIA: Primary | ICD-10-CM

## 2021-02-03 DIAGNOSIS — Q31.9 DYSPLASIA OF LARYNX: ICD-10-CM

## 2021-02-03 DIAGNOSIS — C32.9 LARYNX NEOPLASM MALIGNANT: ICD-10-CM

## 2021-02-03 PROCEDURE — 1159F MED LIST DOCD IN RCRD: CPT | Mod: S$GLB,,, | Performed by: OTOLARYNGOLOGY

## 2021-02-03 PROCEDURE — 3078F PR MOST RECENT DIASTOLIC BLOOD PRESSURE < 80 MM HG: ICD-10-PCS | Mod: CPTII,S$GLB,, | Performed by: OTOLARYNGOLOGY

## 2021-02-03 PROCEDURE — 99999 PR PBB SHADOW E&M-EST. PATIENT-LVL IV: ICD-10-PCS | Mod: PBBFAC,,, | Performed by: OTOLARYNGOLOGY

## 2021-02-03 PROCEDURE — 3074F SYST BP LT 130 MM HG: CPT | Mod: CPTII,S$GLB,, | Performed by: OTOLARYNGOLOGY

## 2021-02-03 PROCEDURE — 31579 PR LARYNGOSCOPY, FLEX/RIGID TELESCOPIC, W/STROBOSCOPY: ICD-10-PCS | Mod: S$GLB,,, | Performed by: OTOLARYNGOLOGY

## 2021-02-03 PROCEDURE — 3078F DIAST BP <80 MM HG: CPT | Mod: CPTII,S$GLB,, | Performed by: OTOLARYNGOLOGY

## 2021-02-03 PROCEDURE — 1159F PR MEDICATION LIST DOCUMENTED IN MEDICAL RECORD: ICD-10-PCS | Mod: S$GLB,,, | Performed by: OTOLARYNGOLOGY

## 2021-02-03 PROCEDURE — 1101F PR PT FALLS ASSESS DOC 0-1 FALLS W/OUT INJ PAST YR: ICD-10-PCS | Mod: CPTII,S$GLB,, | Performed by: OTOLARYNGOLOGY

## 2021-02-03 PROCEDURE — 3074F PR MOST RECENT SYSTOLIC BLOOD PRESSURE < 130 MM HG: ICD-10-PCS | Mod: CPTII,S$GLB,, | Performed by: OTOLARYNGOLOGY

## 2021-02-03 PROCEDURE — 1126F AMNT PAIN NOTED NONE PRSNT: CPT | Mod: S$GLB,,, | Performed by: OTOLARYNGOLOGY

## 2021-02-03 PROCEDURE — 3072F LOW RISK FOR RETINOPATHY: CPT | Mod: S$GLB,,, | Performed by: OTOLARYNGOLOGY

## 2021-02-03 PROCEDURE — 99999 PR PBB SHADOW E&M-EST. PATIENT-LVL IV: CPT | Mod: PBBFAC,,, | Performed by: OTOLARYNGOLOGY

## 2021-02-03 PROCEDURE — 1126F PR PAIN SEVERITY QUANTIFIED, NO PAIN PRESENT: ICD-10-PCS | Mod: S$GLB,,, | Performed by: OTOLARYNGOLOGY

## 2021-02-03 PROCEDURE — 3072F PR LOW RISK FOR RETINOPATHY: ICD-10-PCS | Mod: S$GLB,,, | Performed by: OTOLARYNGOLOGY

## 2021-02-03 PROCEDURE — 99213 PR OFFICE/OUTPT VISIT, EST, LEVL III, 20-29 MIN: ICD-10-PCS | Mod: 25,S$GLB,, | Performed by: OTOLARYNGOLOGY

## 2021-02-03 PROCEDURE — 3288F PR FALLS RISK ASSESSMENT DOCUMENTED: ICD-10-PCS | Mod: CPTII,S$GLB,, | Performed by: OTOLARYNGOLOGY

## 2021-02-03 PROCEDURE — 1101F PT FALLS ASSESS-DOCD LE1/YR: CPT | Mod: CPTII,S$GLB,, | Performed by: OTOLARYNGOLOGY

## 2021-02-03 PROCEDURE — 3288F FALL RISK ASSESSMENT DOCD: CPT | Mod: CPTII,S$GLB,, | Performed by: OTOLARYNGOLOGY

## 2021-02-03 PROCEDURE — 99213 OFFICE O/P EST LOW 20 MIN: CPT | Mod: 25,S$GLB,, | Performed by: OTOLARYNGOLOGY

## 2021-02-03 PROCEDURE — 31579 LARYNGOSCOPY TELESCOPIC: CPT | Mod: S$GLB,,, | Performed by: OTOLARYNGOLOGY

## 2021-02-04 ENCOUNTER — PATIENT MESSAGE (OUTPATIENT)
Dept: OPTOMETRY | Facility: CLINIC | Age: 71
End: 2021-02-04

## 2021-02-05 RX ORDER — DILTIAZEM HYDROCHLORIDE 120 MG/1
120 CAPSULE, COATED, EXTENDED RELEASE ORAL DAILY
Qty: 90 CAPSULE | Refills: 3 | Status: SHIPPED | OUTPATIENT
Start: 2021-02-05 | End: 2021-04-23 | Stop reason: SDUPTHER

## 2021-02-05 RX ORDER — DILTIAZEM HYDROCHLORIDE 120 MG/1
120 CAPSULE, COATED, EXTENDED RELEASE ORAL DAILY
COMMUNITY
End: 2021-02-05 | Stop reason: SDUPTHER

## 2021-02-07 ENCOUNTER — PATIENT OUTREACH (OUTPATIENT)
Dept: ADMINISTRATIVE | Facility: OTHER | Age: 71
End: 2021-02-07

## 2021-02-10 ENCOUNTER — TELEPHONE (OUTPATIENT)
Dept: OPTOMETRY | Facility: CLINIC | Age: 71
End: 2021-02-10

## 2021-02-18 ENCOUNTER — TELEPHONE (OUTPATIENT)
Dept: CARDIOLOGY | Facility: CLINIC | Age: 71
End: 2021-02-18

## 2021-02-25 ENCOUNTER — IMMUNIZATION (OUTPATIENT)
Dept: PRIMARY CARE CLINIC | Facility: CLINIC | Age: 71
End: 2021-02-25

## 2021-02-25 DIAGNOSIS — Z23 NEED FOR VACCINATION: Primary | ICD-10-CM

## 2021-02-25 PROCEDURE — 91300 COVID-19, MRNA, LNP-S, PF, 30 MCG/0.3 ML DOSE VACCINE: CPT | Mod: S$GLB,,, | Performed by: FAMILY MEDICINE

## 2021-02-25 PROCEDURE — 91300 COVID-19, MRNA, LNP-S, PF, 30 MCG/0.3 ML DOSE VACCINE: ICD-10-PCS | Mod: S$GLB,,, | Performed by: FAMILY MEDICINE

## 2021-02-25 PROCEDURE — 0001A COVID-19, MRNA, LNP-S, PF, 30 MCG/0.3 ML DOSE VACCINE: CPT | Mod: CV19,S$GLB,, | Performed by: FAMILY MEDICINE

## 2021-02-25 PROCEDURE — 0001A COVID-19, MRNA, LNP-S, PF, 30 MCG/0.3 ML DOSE VACCINE: ICD-10-PCS | Mod: CV19,S$GLB,, | Performed by: FAMILY MEDICINE

## 2021-03-01 ENCOUNTER — LAB VISIT (OUTPATIENT)
Dept: PRIMARY CARE CLINIC | Facility: CLINIC | Age: 71
End: 2021-03-01
Payer: MEDICARE

## 2021-03-01 DIAGNOSIS — Z03.818 ENCOUNTER FOR OBSERVATION FOR SUSPECTED EXPOSURE TO OTHER BIOLOGICAL AGENTS RULED OUT: ICD-10-CM

## 2021-03-01 PROCEDURE — U0003 INFECTIOUS AGENT DETECTION BY NUCLEIC ACID (DNA OR RNA); SEVERE ACUTE RESPIRATORY SYNDROME CORONAVIRUS 2 (SARS-COV-2) (CORONAVIRUS DISEASE [COVID-19]), AMPLIFIED PROBE TECHNIQUE, MAKING USE OF HIGH THROUGHPUT TECHNOLOGIES AS DESCRIBED BY CMS-2020-01-R: HCPCS

## 2021-03-01 PROCEDURE — U0005 INFEC AGEN DETEC AMPLI PROBE: HCPCS

## 2021-03-02 LAB — SARS-COV-2 RNA RESP QL NAA+PROBE: NOT DETECTED

## 2021-03-03 ENCOUNTER — OFFICE VISIT (OUTPATIENT)
Dept: OTOLARYNGOLOGY | Facility: CLINIC | Age: 71
End: 2021-03-03
Payer: MEDICARE

## 2021-03-03 VITALS — HEART RATE: 68 BPM | SYSTOLIC BLOOD PRESSURE: 127 MMHG | DIASTOLIC BLOOD PRESSURE: 64 MMHG

## 2021-03-03 DIAGNOSIS — R49.0 DYSPHONIA: ICD-10-CM

## 2021-03-03 DIAGNOSIS — Q31.9 DYSPLASIA OF LARYNX: ICD-10-CM

## 2021-03-03 DIAGNOSIS — D38.0 NEOPLASM OF UNCERTAIN BEHAVIOR OF LARYNX: ICD-10-CM

## 2021-03-03 DIAGNOSIS — C32.9 LARYNX NEOPLASM MALIGNANT: Primary | ICD-10-CM

## 2021-03-03 DIAGNOSIS — Z03.818 ENCOUNTER FOR OBSERVATION FOR SUSPECTED EXPOSURE TO OTHER BIOLOGICAL AGENTS RULED OUT: ICD-10-CM

## 2021-03-03 PROCEDURE — 31579 LARYNGOSCOPY TELESCOPIC: CPT | Mod: S$GLB,,, | Performed by: OTOLARYNGOLOGY

## 2021-03-03 PROCEDURE — 1101F PT FALLS ASSESS-DOCD LE1/YR: CPT | Mod: CPTII,S$GLB,, | Performed by: OTOLARYNGOLOGY

## 2021-03-03 PROCEDURE — 1126F PR PAIN SEVERITY QUANTIFIED, NO PAIN PRESENT: ICD-10-PCS | Mod: S$GLB,,, | Performed by: OTOLARYNGOLOGY

## 2021-03-03 PROCEDURE — 3072F PR LOW RISK FOR RETINOPATHY: ICD-10-PCS | Mod: S$GLB,,, | Performed by: OTOLARYNGOLOGY

## 2021-03-03 PROCEDURE — 1159F PR MEDICATION LIST DOCUMENTED IN MEDICAL RECORD: ICD-10-PCS | Mod: S$GLB,,, | Performed by: OTOLARYNGOLOGY

## 2021-03-03 PROCEDURE — 1159F MED LIST DOCD IN RCRD: CPT | Mod: S$GLB,,, | Performed by: OTOLARYNGOLOGY

## 2021-03-03 PROCEDURE — 3072F LOW RISK FOR RETINOPATHY: CPT | Mod: S$GLB,,, | Performed by: OTOLARYNGOLOGY

## 2021-03-03 PROCEDURE — 3078F DIAST BP <80 MM HG: CPT | Mod: CPTII,S$GLB,, | Performed by: OTOLARYNGOLOGY

## 2021-03-03 PROCEDURE — 1126F AMNT PAIN NOTED NONE PRSNT: CPT | Mod: S$GLB,,, | Performed by: OTOLARYNGOLOGY

## 2021-03-03 PROCEDURE — 31579 PR LARYNGOSCOPY, FLEX/RIGID TELESCOPIC, W/STROBOSCOPY: ICD-10-PCS | Mod: S$GLB,,, | Performed by: OTOLARYNGOLOGY

## 2021-03-03 PROCEDURE — 99999 PR PBB SHADOW E&M-EST. PATIENT-LVL V: CPT | Mod: PBBFAC,,, | Performed by: OTOLARYNGOLOGY

## 2021-03-03 PROCEDURE — 99999 PR PBB SHADOW E&M-EST. PATIENT-LVL V: ICD-10-PCS | Mod: PBBFAC,,, | Performed by: OTOLARYNGOLOGY

## 2021-03-03 PROCEDURE — 3078F PR MOST RECENT DIASTOLIC BLOOD PRESSURE < 80 MM HG: ICD-10-PCS | Mod: CPTII,S$GLB,, | Performed by: OTOLARYNGOLOGY

## 2021-03-03 PROCEDURE — 3074F PR MOST RECENT SYSTOLIC BLOOD PRESSURE < 130 MM HG: ICD-10-PCS | Mod: CPTII,S$GLB,, | Performed by: OTOLARYNGOLOGY

## 2021-03-03 PROCEDURE — 99213 OFFICE O/P EST LOW 20 MIN: CPT | Mod: 25,S$GLB,, | Performed by: OTOLARYNGOLOGY

## 2021-03-03 PROCEDURE — 3288F FALL RISK ASSESSMENT DOCD: CPT | Mod: CPTII,S$GLB,, | Performed by: OTOLARYNGOLOGY

## 2021-03-03 PROCEDURE — 3288F PR FALLS RISK ASSESSMENT DOCUMENTED: ICD-10-PCS | Mod: CPTII,S$GLB,, | Performed by: OTOLARYNGOLOGY

## 2021-03-03 PROCEDURE — 99213 PR OFFICE/OUTPT VISIT, EST, LEVL III, 20-29 MIN: ICD-10-PCS | Mod: 25,S$GLB,, | Performed by: OTOLARYNGOLOGY

## 2021-03-03 PROCEDURE — 1101F PR PT FALLS ASSESS DOC 0-1 FALLS W/OUT INJ PAST YR: ICD-10-PCS | Mod: CPTII,S$GLB,, | Performed by: OTOLARYNGOLOGY

## 2021-03-03 PROCEDURE — 3074F SYST BP LT 130 MM HG: CPT | Mod: CPTII,S$GLB,, | Performed by: OTOLARYNGOLOGY

## 2021-03-03 RX ORDER — DEXAMETHASONE SODIUM PHOSPHATE 4 MG/ML
8 INJECTION, SOLUTION INTRA-ARTICULAR; INTRALESIONAL; INTRAMUSCULAR; INTRAVENOUS; SOFT TISSUE
Status: CANCELLED | OUTPATIENT
Start: 2021-03-03

## 2021-03-03 RX ORDER — LIDOCAINE HYDROCHLORIDE 10 MG/ML
1 INJECTION, SOLUTION EPIDURAL; INFILTRATION; INTRACAUDAL; PERINEURAL ONCE
Status: CANCELLED | OUTPATIENT
Start: 2021-03-03 | End: 2021-03-03

## 2021-03-08 ENCOUNTER — TELEPHONE (OUTPATIENT)
Dept: CARDIOLOGY | Facility: CLINIC | Age: 71
End: 2021-03-08

## 2021-03-09 ENCOUNTER — TELEPHONE (OUTPATIENT)
Dept: PREADMISSION TESTING | Facility: HOSPITAL | Age: 71
End: 2021-03-09

## 2021-03-09 DIAGNOSIS — Z01.818 PRE-OP TESTING: Primary | ICD-10-CM

## 2021-03-12 ENCOUNTER — HOSPITAL ENCOUNTER (OUTPATIENT)
Dept: CARDIOLOGY | Facility: HOSPITAL | Age: 71
Discharge: HOME | End: 2021-03-12
Attending: ANESTHESIOLOGY
Payer: MEDICARE

## 2021-03-12 DIAGNOSIS — Z01.818 PRE-OP TESTING: ICD-10-CM

## 2021-03-12 PROCEDURE — 93005 ELECTROCARDIOGRAM TRACING: CPT

## 2021-03-12 PROCEDURE — 93010 EKG 12-LEAD: ICD-10-PCS | Mod: ,,, | Performed by: INTERNAL MEDICINE

## 2021-03-12 PROCEDURE — 93010 ELECTROCARDIOGRAM REPORT: CPT | Mod: ,,, | Performed by: INTERNAL MEDICINE

## 2021-03-13 ENCOUNTER — LAB VISIT (OUTPATIENT)
Dept: PRIMARY CARE CLINIC | Facility: CLINIC | Age: 71
End: 2021-03-13
Payer: MEDICARE

## 2021-03-13 DIAGNOSIS — Z03.818 ENCOUNTER FOR OBSERVATION FOR SUSPECTED EXPOSURE TO OTHER BIOLOGICAL AGENTS RULED OUT: ICD-10-CM

## 2021-03-13 PROCEDURE — U0003 INFECTIOUS AGENT DETECTION BY NUCLEIC ACID (DNA OR RNA); SEVERE ACUTE RESPIRATORY SYNDROME CORONAVIRUS 2 (SARS-COV-2) (CORONAVIRUS DISEASE [COVID-19]), AMPLIFIED PROBE TECHNIQUE, MAKING USE OF HIGH THROUGHPUT TECHNOLOGIES AS DESCRIBED BY CMS-2020-01-R: HCPCS | Performed by: OTOLARYNGOLOGY

## 2021-03-13 PROCEDURE — U0005 INFEC AGEN DETEC AMPLI PROBE: HCPCS | Performed by: OTOLARYNGOLOGY

## 2021-03-14 LAB — SARS-COV-2 RNA RESP QL NAA+PROBE: NOT DETECTED

## 2021-03-15 ENCOUNTER — TELEPHONE (OUTPATIENT)
Dept: OTOLARYNGOLOGY | Facility: CLINIC | Age: 71
End: 2021-03-15

## 2021-03-16 ENCOUNTER — HOSPITAL ENCOUNTER (OUTPATIENT)
Facility: HOSPITAL | Age: 71
Discharge: HOME | End: 2021-03-16
Attending: OTOLARYNGOLOGY | Admitting: OTOLARYNGOLOGY
Payer: MEDICARE

## 2021-03-16 ENCOUNTER — ANESTHESIA (OUTPATIENT)
Dept: SURGERY | Facility: HOSPITAL | Age: 71
End: 2021-03-16
Payer: MEDICARE

## 2021-03-16 ENCOUNTER — ANESTHESIA EVENT (OUTPATIENT)
Dept: SURGERY | Facility: HOSPITAL | Age: 71
End: 2021-03-16
Payer: MEDICARE

## 2021-03-16 VITALS
RESPIRATION RATE: 13 BRPM | OXYGEN SATURATION: 99 % | HEART RATE: 100 BPM | DIASTOLIC BLOOD PRESSURE: 75 MMHG | HEIGHT: 67 IN | BODY MASS INDEX: 22.91 KG/M2 | SYSTOLIC BLOOD PRESSURE: 134 MMHG | WEIGHT: 146 LBS | TEMPERATURE: 97 F

## 2021-03-16 DIAGNOSIS — C32.9 LARYNX NEOPLASM MALIGNANT: Primary | ICD-10-CM

## 2021-03-16 DIAGNOSIS — R49.0 DYSPHONIA: ICD-10-CM

## 2021-03-16 DIAGNOSIS — D38.0 NEOPLASM OF UNCERTAIN BEHAVIOR OF LARYNX: ICD-10-CM

## 2021-03-16 LAB
POCT GLUCOSE: 103 MG/DL (ref 70–110)
POCT GLUCOSE: 172 MG/DL (ref 70–110)

## 2021-03-16 PROCEDURE — D9220A PRA ANESTHESIA: ICD-10-PCS | Mod: CRNA,,, | Performed by: NURSE ANESTHETIST, CERTIFIED REGISTERED

## 2021-03-16 PROCEDURE — D9220A PRA ANESTHESIA: ICD-10-PCS | Mod: ANES,,, | Performed by: ANESTHESIOLOGY

## 2021-03-16 PROCEDURE — 25000003 PHARM REV CODE 250: Performed by: OTOLARYNGOLOGY

## 2021-03-16 PROCEDURE — 63600175 PHARM REV CODE 636 W HCPCS: Performed by: OTOLARYNGOLOGY

## 2021-03-16 PROCEDURE — 25000003 PHARM REV CODE 250: Performed by: NURSE ANESTHETIST, CERTIFIED REGISTERED

## 2021-03-16 PROCEDURE — 88305 TISSUE EXAM BY PATHOLOGIST: ICD-10-PCS | Mod: 26,,, | Performed by: PATHOLOGY

## 2021-03-16 PROCEDURE — D9220A PRA ANESTHESIA: Mod: ANES,,, | Performed by: ANESTHESIOLOGY

## 2021-03-16 PROCEDURE — 37000008 HC ANESTHESIA 1ST 15 MINUTES: Performed by: OTOLARYNGOLOGY

## 2021-03-16 PROCEDURE — 31541 PR LARYNGOSCOPY,DIRCT,OP SCOP,EXC TUMR: ICD-10-PCS | Mod: ,,, | Performed by: OTOLARYNGOLOGY

## 2021-03-16 PROCEDURE — 37000009 HC ANESTHESIA EA ADD 15 MINS: Performed by: OTOLARYNGOLOGY

## 2021-03-16 PROCEDURE — D9220A PRA ANESTHESIA: Mod: CRNA,,, | Performed by: NURSE ANESTHETIST, CERTIFIED REGISTERED

## 2021-03-16 PROCEDURE — 63600175 PHARM REV CODE 636 W HCPCS: Performed by: NURSE ANESTHETIST, CERTIFIED REGISTERED

## 2021-03-16 PROCEDURE — 82962 GLUCOSE BLOOD TEST: CPT | Performed by: OTOLARYNGOLOGY

## 2021-03-16 PROCEDURE — 88331 PATH CONSLTJ SURG 1 BLK 1SPC: CPT | Performed by: PATHOLOGY

## 2021-03-16 PROCEDURE — 88342 IMHCHEM/IMCYTCHM 1ST ANTB: CPT | Mod: 26,,, | Performed by: PATHOLOGY

## 2021-03-16 PROCEDURE — 36000709 HC OR TIME LEV III EA ADD 15 MIN: Performed by: OTOLARYNGOLOGY

## 2021-03-16 PROCEDURE — 82962 GLUCOSE BLOOD TEST: CPT | Mod: 91 | Performed by: OTOLARYNGOLOGY

## 2021-03-16 PROCEDURE — 36000708 HC OR TIME LEV III 1ST 15 MIN: Performed by: OTOLARYNGOLOGY

## 2021-03-16 PROCEDURE — 88331 PATH CONSLTJ SURG 1 BLK 1SPC: CPT | Mod: 26,,, | Performed by: PATHOLOGY

## 2021-03-16 PROCEDURE — 88305 TISSUE EXAM BY PATHOLOGIST: CPT | Mod: 26,,, | Performed by: PATHOLOGY

## 2021-03-16 PROCEDURE — 71000015 HC POSTOP RECOV 1ST HR: Performed by: OTOLARYNGOLOGY

## 2021-03-16 PROCEDURE — 88342 IMHCHEM/IMCYTCHM 1ST ANTB: CPT | Mod: 59 | Performed by: PATHOLOGY

## 2021-03-16 PROCEDURE — 71000044 HC DOSC ROUTINE RECOVERY FIRST HOUR: Performed by: OTOLARYNGOLOGY

## 2021-03-16 PROCEDURE — 71000016 HC POSTOP RECOV ADDL HR: Performed by: OTOLARYNGOLOGY

## 2021-03-16 PROCEDURE — 88331 PR  PATH CONSULT IN SURG,W FRZ SEC: ICD-10-PCS | Mod: 26,,, | Performed by: PATHOLOGY

## 2021-03-16 PROCEDURE — 31541 LARYNSCOP W/TUMR EXC + SCOPE: CPT | Mod: ,,, | Performed by: OTOLARYNGOLOGY

## 2021-03-16 PROCEDURE — 88342 CHG IMMUNOCYTOCHEMISTRY: ICD-10-PCS | Mod: 26,,, | Performed by: PATHOLOGY

## 2021-03-16 PROCEDURE — 27201423 OPTIME MED/SURG SUP & DEVICES STERILE SUPPLY: Performed by: OTOLARYNGOLOGY

## 2021-03-16 PROCEDURE — 88305 TISSUE EXAM BY PATHOLOGIST: CPT | Mod: 59 | Performed by: PATHOLOGY

## 2021-03-16 RX ORDER — PROPOFOL 10 MG/ML
VIAL (ML) INTRAVENOUS
Status: DISCONTINUED | OUTPATIENT
Start: 2021-03-16 | End: 2021-03-16

## 2021-03-16 RX ORDER — PHENYLEPHRINE HYDROCHLORIDE 10 MG/ML
INJECTION INTRAVENOUS
Status: DISCONTINUED | OUTPATIENT
Start: 2021-03-16 | End: 2021-03-16

## 2021-03-16 RX ORDER — DEXAMETHASONE SODIUM PHOSPHATE 4 MG/ML
8 INJECTION, SOLUTION INTRA-ARTICULAR; INTRALESIONAL; INTRAMUSCULAR; INTRAVENOUS; SOFT TISSUE
Status: DISCONTINUED | OUTPATIENT
Start: 2021-03-16 | End: 2021-03-16 | Stop reason: HOSPADM

## 2021-03-16 RX ORDER — ACETAMINOPHEN 10 MG/ML
INJECTION, SOLUTION INTRAVENOUS
Status: DISCONTINUED | OUTPATIENT
Start: 2021-03-16 | End: 2021-03-16

## 2021-03-16 RX ORDER — LIDOCAINE HYDROCHLORIDE 40 MG/ML
INJECTION, SOLUTION RETROBULBAR
Status: DISCONTINUED | OUTPATIENT
Start: 2021-03-16 | End: 2021-03-16 | Stop reason: HOSPADM

## 2021-03-16 RX ORDER — HYDROCODONE BITARTRATE AND ACETAMINOPHEN 5; 325 MG/1; MG/1
1 TABLET ORAL EVERY 6 HOURS PRN
Qty: 24 TABLET | Refills: 0 | Status: SHIPPED | OUTPATIENT
Start: 2021-03-16 | End: 2021-03-16 | Stop reason: SDUPTHER

## 2021-03-16 RX ORDER — FENTANYL CITRATE 50 UG/ML
INJECTION, SOLUTION INTRAMUSCULAR; INTRAVENOUS
Status: DISCONTINUED | OUTPATIENT
Start: 2021-03-16 | End: 2021-03-16

## 2021-03-16 RX ORDER — CEFAZOLIN SODIUM 1 G/3ML
INJECTION, POWDER, FOR SOLUTION INTRAMUSCULAR; INTRAVENOUS
Status: DISCONTINUED | OUTPATIENT
Start: 2021-03-16 | End: 2021-03-16

## 2021-03-16 RX ORDER — FENTANYL CITRATE 50 UG/ML
25 INJECTION, SOLUTION INTRAMUSCULAR; INTRAVENOUS EVERY 5 MIN PRN
Status: DISCONTINUED | OUTPATIENT
Start: 2021-03-16 | End: 2021-03-16 | Stop reason: HOSPADM

## 2021-03-16 RX ORDER — LIDOCAINE HYDROCHLORIDE 10 MG/ML
1 INJECTION, SOLUTION EPIDURAL; INFILTRATION; INTRACAUDAL; PERINEURAL ONCE
Status: DISCONTINUED | OUTPATIENT
Start: 2021-03-16 | End: 2021-03-16 | Stop reason: HOSPADM

## 2021-03-16 RX ORDER — ONDANSETRON 4 MG/1
4 TABLET, ORALLY DISINTEGRATING ORAL EVERY 6 HOURS PRN
Qty: 20 TABLET | Refills: 0 | Status: SHIPPED | OUTPATIENT
Start: 2021-03-16 | End: 2021-05-03 | Stop reason: CLARIF

## 2021-03-16 RX ORDER — EPINEPHRINE 1 MG/ML
INJECTION, SOLUTION INTRACARDIAC; INTRAMUSCULAR; INTRAVENOUS; SUBCUTANEOUS
Status: DISCONTINUED | OUTPATIENT
Start: 2021-03-16 | End: 2021-03-16 | Stop reason: HOSPADM

## 2021-03-16 RX ORDER — HYDROCODONE BITARTRATE AND ACETAMINOPHEN 5; 325 MG/1; MG/1
1 TABLET ORAL EVERY 6 HOURS PRN
Qty: 24 TABLET | Refills: 0 | Status: SHIPPED | OUTPATIENT
Start: 2021-03-16 | End: 2021-05-03 | Stop reason: CLARIF

## 2021-03-16 RX ORDER — AMOXICILLIN AND CLAVULANATE POTASSIUM 875; 125 MG/1; MG/1
1 TABLET, FILM COATED ORAL 2 TIMES DAILY
Qty: 20 TABLET | Refills: 0 | Status: SHIPPED | OUTPATIENT
Start: 2021-03-16 | End: 2021-03-17

## 2021-03-16 RX ORDER — ONDANSETRON 2 MG/ML
INJECTION INTRAMUSCULAR; INTRAVENOUS
Status: DISCONTINUED | OUTPATIENT
Start: 2021-03-16 | End: 2021-03-16

## 2021-03-16 RX ORDER — ONDANSETRON 2 MG/ML
4 INJECTION INTRAMUSCULAR; INTRAVENOUS DAILY PRN
Status: DISCONTINUED | OUTPATIENT
Start: 2021-03-16 | End: 2021-03-16 | Stop reason: HOSPADM

## 2021-03-16 RX ORDER — ROCURONIUM BROMIDE 10 MG/ML
INJECTION, SOLUTION INTRAVENOUS
Status: DISCONTINUED | OUTPATIENT
Start: 2021-03-16 | End: 2021-03-16

## 2021-03-16 RX ORDER — SODIUM CHLORIDE 0.9 % (FLUSH) 0.9 %
10 SYRINGE (ML) INJECTION
Status: DISCONTINUED | OUTPATIENT
Start: 2021-03-16 | End: 2021-03-16 | Stop reason: HOSPADM

## 2021-03-16 RX ORDER — MIDAZOLAM HYDROCHLORIDE 1 MG/ML
INJECTION, SOLUTION INTRAMUSCULAR; INTRAVENOUS
Status: DISCONTINUED | OUTPATIENT
Start: 2021-03-16 | End: 2021-03-16

## 2021-03-16 RX ORDER — FAMOTIDINE 10 MG/ML
INJECTION INTRAVENOUS
Status: DISCONTINUED | OUTPATIENT
Start: 2021-03-16 | End: 2021-03-16

## 2021-03-16 RX ORDER — EPHEDRINE SULFATE 50 MG/ML
INJECTION, SOLUTION INTRAVENOUS
Status: DISCONTINUED | OUTPATIENT
Start: 2021-03-16 | End: 2021-03-16

## 2021-03-16 RX ORDER — LIDOCAINE HYDROCHLORIDE 20 MG/ML
INJECTION, SOLUTION EPIDURAL; INFILTRATION; INTRACAUDAL; PERINEURAL
Status: DISCONTINUED | OUTPATIENT
Start: 2021-03-16 | End: 2021-03-16

## 2021-03-16 RX ADMIN — ACETAMINOPHEN 1000 MG: 10 INJECTION, SOLUTION INTRAVENOUS at 10:03

## 2021-03-16 RX ADMIN — EPHEDRINE SULFATE 10 MG: 50 INJECTION INTRAVENOUS at 01:03

## 2021-03-16 RX ADMIN — EPHEDRINE SULFATE 10 MG: 50 INJECTION INTRAVENOUS at 11:03

## 2021-03-16 RX ADMIN — PHENYLEPHRINE HYDROCHLORIDE 200 MCG: 10 INJECTION INTRAVENOUS at 12:03

## 2021-03-16 RX ADMIN — EPHEDRINE SULFATE 5 MG: 50 INJECTION INTRAVENOUS at 11:03

## 2021-03-16 RX ADMIN — ROCURONIUM BROMIDE 10 MG: 10 INJECTION, SOLUTION INTRAVENOUS at 10:03

## 2021-03-16 RX ADMIN — ONDANSETRON 4 MG: 2 INJECTION, SOLUTION INTRAMUSCULAR; INTRAVENOUS at 10:03

## 2021-03-16 RX ADMIN — PHENYLEPHRINE HYDROCHLORIDE 100 MCG: 10 INJECTION INTRAVENOUS at 10:03

## 2021-03-16 RX ADMIN — LIDOCAINE HYDROCHLORIDE 100 MG: 20 INJECTION, SOLUTION EPIDURAL; INFILTRATION; INTRACAUDAL at 10:03

## 2021-03-16 RX ADMIN — FENTANYL CITRATE 50 MCG: 50 INJECTION INTRAMUSCULAR; INTRAVENOUS at 10:03

## 2021-03-16 RX ADMIN — PHENYLEPHRINE HYDROCHLORIDE 200 MCG: 10 INJECTION INTRAVENOUS at 11:03

## 2021-03-16 RX ADMIN — MIDAZOLAM 2 MG: 1 INJECTION INTRAMUSCULAR; INTRAVENOUS at 10:03

## 2021-03-16 RX ADMIN — DEXAMETHASONE SODIUM PHOSPHATE 8 MG: 4 INJECTION, SOLUTION INTRA-ARTICULAR; INTRALESIONAL; INTRAMUSCULAR; INTRAVENOUS; SOFT TISSUE at 10:03

## 2021-03-16 RX ADMIN — FENTANYL CITRATE 50 MCG: 50 INJECTION INTRAMUSCULAR; INTRAVENOUS at 02:03

## 2021-03-16 RX ADMIN — CEFAZOLIN 2 G: 330 INJECTION, POWDER, FOR SOLUTION INTRAMUSCULAR; INTRAVENOUS at 10:03

## 2021-03-16 RX ADMIN — FENTANYL CITRATE 100 MCG: 50 INJECTION INTRAMUSCULAR; INTRAVENOUS at 10:03

## 2021-03-16 RX ADMIN — ROCURONIUM BROMIDE 40 MG: 10 INJECTION, SOLUTION INTRAVENOUS at 10:03

## 2021-03-16 RX ADMIN — PHENYLEPHRINE HYDROCHLORIDE 200 MCG: 10 INJECTION INTRAVENOUS at 10:03

## 2021-03-16 RX ADMIN — ROCURONIUM BROMIDE 10 MG: 10 INJECTION, SOLUTION INTRAVENOUS at 11:03

## 2021-03-16 RX ADMIN — FAMOTIDINE 20 MG: 10 INJECTION, SOLUTION INTRAVENOUS at 10:03

## 2021-03-16 RX ADMIN — SUGAMMADEX 132 MG: 100 INJECTION, SOLUTION INTRAVENOUS at 02:03

## 2021-03-16 RX ADMIN — ROCURONIUM BROMIDE 10 MG: 10 INJECTION, SOLUTION INTRAVENOUS at 01:03

## 2021-03-16 RX ADMIN — PROPOFOL 150 MG: 10 INJECTION, EMULSION INTRAVENOUS at 10:03

## 2021-03-16 RX ADMIN — SODIUM CHLORIDE: 0.9 INJECTION, SOLUTION INTRAVENOUS at 10:03

## 2021-03-17 ENCOUNTER — TELEPHONE (OUTPATIENT)
Dept: OTOLARYNGOLOGY | Facility: CLINIC | Age: 71
End: 2021-03-17

## 2021-03-17 RX ORDER — AMOXICILLIN 400 MG/5ML
10 POWDER, FOR SUSPENSION ORAL 2 TIMES DAILY
Qty: 280 ML | Refills: 0 | Status: ON HOLD | OUTPATIENT
Start: 2021-03-17 | End: 2021-04-01 | Stop reason: HOSPADM

## 2021-03-18 ENCOUNTER — IMMUNIZATION (OUTPATIENT)
Dept: PRIMARY CARE CLINIC | Facility: CLINIC | Age: 71
End: 2021-03-18
Payer: MEDICARE

## 2021-03-18 DIAGNOSIS — Z23 NEED FOR VACCINATION: Primary | ICD-10-CM

## 2021-03-18 PROCEDURE — 91300 COVID-19, MRNA, LNP-S, PF, 30 MCG/0.3 ML DOSE VACCINE: CPT | Mod: S$GLB,,, | Performed by: FAMILY MEDICINE

## 2021-03-18 PROCEDURE — 91300 COVID-19, MRNA, LNP-S, PF, 30 MCG/0.3 ML DOSE VACCINE: ICD-10-PCS | Mod: S$GLB,,, | Performed by: FAMILY MEDICINE

## 2021-03-18 PROCEDURE — 0002A COVID-19, MRNA, LNP-S, PF, 30 MCG/0.3 ML DOSE VACCINE: CPT | Mod: CV19,S$GLB,, | Performed by: FAMILY MEDICINE

## 2021-03-18 PROCEDURE — 0002A COVID-19, MRNA, LNP-S, PF, 30 MCG/0.3 ML DOSE VACCINE: ICD-10-PCS | Mod: CV19,S$GLB,, | Performed by: FAMILY MEDICINE

## 2021-03-22 LAB
FINAL PATHOLOGIC DIAGNOSIS: NORMAL
FROZEN SECTION DIAGNOSIS: NORMAL
FROZEN SECTION FOOTNOTE: NORMAL
GROSS: NORMAL
Lab: NORMAL
MICROSCOPIC EXAM: NORMAL

## 2021-03-26 DIAGNOSIS — D38.0 NEOPLASM OF UNCERTAIN BEHAVIOR OF LARYNX: ICD-10-CM

## 2021-03-26 DIAGNOSIS — C32.9 MALIGNANT NEOPLASM DETERMINED BY BIOPSY OF LARYNX: ICD-10-CM

## 2021-03-26 DIAGNOSIS — C32.9 LARYNX NEOPLASM MALIGNANT: ICD-10-CM

## 2021-03-26 DIAGNOSIS — Q31.9 DYSPLASIA OF LARYNX: ICD-10-CM

## 2021-03-26 DIAGNOSIS — R49.0 DYSPHONIA: Primary | ICD-10-CM

## 2021-03-26 RX ORDER — LIDOCAINE HYDROCHLORIDE 10 MG/ML
1 INJECTION, SOLUTION EPIDURAL; INFILTRATION; INTRACAUDAL; PERINEURAL ONCE
Status: CANCELLED | OUTPATIENT
Start: 2021-03-26 | End: 2021-03-26

## 2021-03-26 RX ORDER — DEXAMETHASONE SODIUM PHOSPHATE 4 MG/ML
8 INJECTION, SOLUTION INTRA-ARTICULAR; INTRALESIONAL; INTRAMUSCULAR; INTRAVENOUS; SOFT TISSUE
Status: CANCELLED | OUTPATIENT
Start: 2021-03-26

## 2021-03-29 ENCOUNTER — LAB VISIT (OUTPATIENT)
Dept: PRIMARY CARE CLINIC | Facility: CLINIC | Age: 71
End: 2021-03-29
Payer: MEDICARE

## 2021-03-29 DIAGNOSIS — Z03.818 ENCOUNTER FOR OBSERVATION FOR SUSPECTED EXPOSURE TO OTHER BIOLOGICAL AGENTS RULED OUT: ICD-10-CM

## 2021-03-29 PROCEDURE — U0005 INFEC AGEN DETEC AMPLI PROBE: HCPCS | Performed by: OTOLARYNGOLOGY

## 2021-03-29 PROCEDURE — U0003 INFECTIOUS AGENT DETECTION BY NUCLEIC ACID (DNA OR RNA); SEVERE ACUTE RESPIRATORY SYNDROME CORONAVIRUS 2 (SARS-COV-2) (CORONAVIRUS DISEASE [COVID-19]), AMPLIFIED PROBE TECHNIQUE, MAKING USE OF HIGH THROUGHPUT TECHNOLOGIES AS DESCRIBED BY CMS-2020-01-R: HCPCS | Performed by: OTOLARYNGOLOGY

## 2021-03-30 LAB — SARS-COV-2 RNA RESP QL NAA+PROBE: NOT DETECTED

## 2021-03-31 ENCOUNTER — TELEPHONE (OUTPATIENT)
Dept: OTOLARYNGOLOGY | Facility: CLINIC | Age: 71
End: 2021-03-31

## 2021-03-31 ENCOUNTER — PATIENT MESSAGE (OUTPATIENT)
Dept: FAMILY MEDICINE | Facility: CLINIC | Age: 71
End: 2021-03-31

## 2021-03-31 ENCOUNTER — ANESTHESIA EVENT (OUTPATIENT)
Dept: SURGERY | Facility: HOSPITAL | Age: 71
End: 2021-03-31
Payer: MEDICARE

## 2021-04-01 ENCOUNTER — HOSPITAL ENCOUNTER (OUTPATIENT)
Facility: HOSPITAL | Age: 71
Discharge: HOME | End: 2021-04-01
Attending: OTOLARYNGOLOGY | Admitting: OTOLARYNGOLOGY
Payer: MEDICARE

## 2021-04-01 ENCOUNTER — ANESTHESIA (OUTPATIENT)
Dept: SURGERY | Facility: HOSPITAL | Age: 71
End: 2021-04-01
Payer: MEDICARE

## 2021-04-01 VITALS
RESPIRATION RATE: 14 BRPM | BODY MASS INDEX: 22.44 KG/M2 | HEART RATE: 74 BPM | HEIGHT: 67 IN | SYSTOLIC BLOOD PRESSURE: 159 MMHG | TEMPERATURE: 98 F | OXYGEN SATURATION: 100 % | DIASTOLIC BLOOD PRESSURE: 85 MMHG | WEIGHT: 143 LBS

## 2021-04-01 DIAGNOSIS — C32.9 MALIGNANT NEOPLASM DETERMINED BY BIOPSY OF LARYNX: Primary | ICD-10-CM

## 2021-04-01 DIAGNOSIS — C32.9 LARYNX NEOPLASM MALIGNANT: ICD-10-CM

## 2021-04-01 LAB — POCT GLUCOSE: 109 MG/DL (ref 70–110)

## 2021-04-01 PROCEDURE — 82962 GLUCOSE BLOOD TEST: CPT | Performed by: OTOLARYNGOLOGY

## 2021-04-01 PROCEDURE — 88305 TISSUE EXAM BY PATHOLOGIST: CPT | Mod: 26,,, | Performed by: STUDENT IN AN ORGANIZED HEALTH CARE EDUCATION/TRAINING PROGRAM

## 2021-04-01 PROCEDURE — 88305 TISSUE EXAM BY PATHOLOGIST: CPT | Performed by: STUDENT IN AN ORGANIZED HEALTH CARE EDUCATION/TRAINING PROGRAM

## 2021-04-01 PROCEDURE — 88342 IMHCHEM/IMCYTCHM 1ST ANTB: CPT | Mod: 59 | Performed by: STUDENT IN AN ORGANIZED HEALTH CARE EDUCATION/TRAINING PROGRAM

## 2021-04-01 PROCEDURE — 88341 PR IHC OR ICC EACH ADD'L SINGLE ANTIBODY  STAINPR: ICD-10-PCS | Mod: 26,,, | Performed by: STUDENT IN AN ORGANIZED HEALTH CARE EDUCATION/TRAINING PROGRAM

## 2021-04-01 PROCEDURE — 88305 TISSUE EXAM BY PATHOLOGIST: ICD-10-PCS | Mod: 26,,, | Performed by: STUDENT IN AN ORGANIZED HEALTH CARE EDUCATION/TRAINING PROGRAM

## 2021-04-01 PROCEDURE — D9220A PRA ANESTHESIA: Mod: CRNA,,, | Performed by: NURSE ANESTHETIST, CERTIFIED REGISTERED

## 2021-04-01 PROCEDURE — 71000015 HC POSTOP RECOV 1ST HR: Performed by: OTOLARYNGOLOGY

## 2021-04-01 PROCEDURE — D9220A PRA ANESTHESIA: ICD-10-PCS | Mod: ANES,,, | Performed by: ANESTHESIOLOGY

## 2021-04-01 PROCEDURE — 36000708 HC OR TIME LEV III 1ST 15 MIN: Performed by: OTOLARYNGOLOGY

## 2021-04-01 PROCEDURE — 88341 IMHCHEM/IMCYTCHM EA ADD ANTB: CPT | Mod: 26,,, | Performed by: STUDENT IN AN ORGANIZED HEALTH CARE EDUCATION/TRAINING PROGRAM

## 2021-04-01 PROCEDURE — 37000008 HC ANESTHESIA 1ST 15 MINUTES: Performed by: OTOLARYNGOLOGY

## 2021-04-01 PROCEDURE — 36000709 HC OR TIME LEV III EA ADD 15 MIN: Performed by: OTOLARYNGOLOGY

## 2021-04-01 PROCEDURE — 88342 IMHCHEM/IMCYTCHM 1ST ANTB: CPT | Mod: 26,,, | Performed by: STUDENT IN AN ORGANIZED HEALTH CARE EDUCATION/TRAINING PROGRAM

## 2021-04-01 PROCEDURE — 25000003 PHARM REV CODE 250: Performed by: NURSE ANESTHETIST, CERTIFIED REGISTERED

## 2021-04-01 PROCEDURE — 88331 PR  PATH CONSULT IN SURG,W FRZ SEC: ICD-10-PCS | Mod: 26,,, | Performed by: PATHOLOGY

## 2021-04-01 PROCEDURE — D9220A PRA ANESTHESIA: Mod: ANES,,, | Performed by: ANESTHESIOLOGY

## 2021-04-01 PROCEDURE — D9220A PRA ANESTHESIA: ICD-10-PCS | Mod: CRNA,,, | Performed by: NURSE ANESTHETIST, CERTIFIED REGISTERED

## 2021-04-01 PROCEDURE — 27201423 OPTIME MED/SURG SUP & DEVICES STERILE SUPPLY: Performed by: OTOLARYNGOLOGY

## 2021-04-01 PROCEDURE — 82962 GLUCOSE BLOOD TEST: CPT | Mod: 91 | Performed by: OTOLARYNGOLOGY

## 2021-04-01 PROCEDURE — 88341 IMHCHEM/IMCYTCHM EA ADD ANTB: CPT | Performed by: STUDENT IN AN ORGANIZED HEALTH CARE EDUCATION/TRAINING PROGRAM

## 2021-04-01 PROCEDURE — 31541 PR LARYNGOSCOPY,DIRCT,OP SCOP,EXC TUMR: ICD-10-PCS | Mod: ,,, | Performed by: OTOLARYNGOLOGY

## 2021-04-01 PROCEDURE — 88331 PATH CONSLTJ SURG 1 BLK 1SPC: CPT | Performed by: STUDENT IN AN ORGANIZED HEALTH CARE EDUCATION/TRAINING PROGRAM

## 2021-04-01 PROCEDURE — 31541 LARYNSCOP W/TUMR EXC + SCOPE: CPT | Mod: ,,, | Performed by: OTOLARYNGOLOGY

## 2021-04-01 PROCEDURE — 71000044 HC DOSC ROUTINE RECOVERY FIRST HOUR: Performed by: OTOLARYNGOLOGY

## 2021-04-01 PROCEDURE — 25000003 PHARM REV CODE 250: Performed by: OTOLARYNGOLOGY

## 2021-04-01 PROCEDURE — 63600175 PHARM REV CODE 636 W HCPCS: Performed by: OTOLARYNGOLOGY

## 2021-04-01 PROCEDURE — 88342 CHG IMMUNOCYTOCHEMISTRY: ICD-10-PCS | Mod: 26,,, | Performed by: STUDENT IN AN ORGANIZED HEALTH CARE EDUCATION/TRAINING PROGRAM

## 2021-04-01 PROCEDURE — 63600175 PHARM REV CODE 636 W HCPCS: Performed by: NURSE ANESTHETIST, CERTIFIED REGISTERED

## 2021-04-01 PROCEDURE — 37000009 HC ANESTHESIA EA ADD 15 MINS: Performed by: OTOLARYNGOLOGY

## 2021-04-01 PROCEDURE — 88331 PATH CONSLTJ SURG 1 BLK 1SPC: CPT | Mod: 26,,, | Performed by: PATHOLOGY

## 2021-04-01 RX ORDER — PROPOFOL 10 MG/ML
VIAL (ML) INTRAVENOUS
Status: DISCONTINUED | OUTPATIENT
Start: 2021-04-01 | End: 2021-04-01

## 2021-04-01 RX ORDER — ONDANSETRON 2 MG/ML
INJECTION INTRAMUSCULAR; INTRAVENOUS
Status: DISCONTINUED | OUTPATIENT
Start: 2021-04-01 | End: 2021-04-01

## 2021-04-01 RX ORDER — HYDROCODONE BITARTRATE AND ACETAMINOPHEN 7.5; 325 MG/15ML; MG/15ML
15 SOLUTION ORAL EVERY 4 HOURS PRN
Status: DISCONTINUED | OUTPATIENT
Start: 2021-04-01 | End: 2021-04-01 | Stop reason: HOSPADM

## 2021-04-01 RX ORDER — ROCURONIUM BROMIDE 10 MG/ML
INJECTION, SOLUTION INTRAVENOUS
Status: DISCONTINUED | OUTPATIENT
Start: 2021-04-01 | End: 2021-04-01

## 2021-04-01 RX ORDER — EPINEPHRINE 1 MG/ML
INJECTION, SOLUTION INTRACARDIAC; INTRAMUSCULAR; INTRAVENOUS; SUBCUTANEOUS
Status: DISCONTINUED | OUTPATIENT
Start: 2021-04-01 | End: 2021-04-01 | Stop reason: HOSPADM

## 2021-04-01 RX ORDER — ONDANSETRON 2 MG/ML
4 INJECTION INTRAMUSCULAR; INTRAVENOUS DAILY PRN
Status: DISCONTINUED | OUTPATIENT
Start: 2021-04-01 | End: 2021-04-01 | Stop reason: HOSPADM

## 2021-04-01 RX ORDER — LIDOCAINE HYDROCHLORIDE 10 MG/ML
1 INJECTION, SOLUTION EPIDURAL; INFILTRATION; INTRACAUDAL; PERINEURAL ONCE
Status: COMPLETED | OUTPATIENT
Start: 2021-04-01 | End: 2021-04-01

## 2021-04-01 RX ORDER — LIDOCAINE HYDROCHLORIDE 40 MG/ML
INJECTION, SOLUTION RETROBULBAR
Status: DISCONTINUED | OUTPATIENT
Start: 2021-04-01 | End: 2021-04-01 | Stop reason: HOSPADM

## 2021-04-01 RX ORDER — MIDAZOLAM HYDROCHLORIDE 1 MG/ML
INJECTION, SOLUTION INTRAMUSCULAR; INTRAVENOUS
Status: DISCONTINUED | OUTPATIENT
Start: 2021-04-01 | End: 2021-04-01

## 2021-04-01 RX ORDER — ACETAMINOPHEN 10 MG/ML
INJECTION, SOLUTION INTRAVENOUS
Status: DISCONTINUED | OUTPATIENT
Start: 2021-04-01 | End: 2021-04-01

## 2021-04-01 RX ORDER — LIDOCAINE HYDROCHLORIDE 20 MG/ML
INJECTION INTRAVENOUS
Status: DISCONTINUED | OUTPATIENT
Start: 2021-04-01 | End: 2021-04-01

## 2021-04-01 RX ORDER — DEXAMETHASONE SODIUM PHOSPHATE 4 MG/ML
INJECTION, SOLUTION INTRA-ARTICULAR; INTRALESIONAL; INTRAMUSCULAR; INTRAVENOUS; SOFT TISSUE
Status: DISCONTINUED | OUTPATIENT
Start: 2021-04-01 | End: 2021-04-01

## 2021-04-01 RX ORDER — FENTANYL CITRATE 50 UG/ML
25 INJECTION, SOLUTION INTRAMUSCULAR; INTRAVENOUS EVERY 5 MIN PRN
Status: DISCONTINUED | OUTPATIENT
Start: 2021-04-01 | End: 2021-04-01 | Stop reason: HOSPADM

## 2021-04-01 RX ORDER — AMOXICILLIN AND CLAVULANATE POTASSIUM 600; 42.9 MG/5ML; MG/5ML
800 POWDER, FOR SUSPENSION ORAL EVERY 12 HOURS
Qty: 150 ML | Refills: 0 | Status: SHIPPED | OUTPATIENT
Start: 2021-04-01 | End: 2021-04-11

## 2021-04-01 RX ORDER — FENTANYL CITRATE 50 UG/ML
INJECTION, SOLUTION INTRAMUSCULAR; INTRAVENOUS
Status: DISCONTINUED | OUTPATIENT
Start: 2021-04-01 | End: 2021-04-01

## 2021-04-01 RX ADMIN — SODIUM CHLORIDE: 0.9 INJECTION, SOLUTION INTRAVENOUS at 01:04

## 2021-04-01 RX ADMIN — DEXAMETHASONE SODIUM PHOSPHATE 8 MG: 4 INJECTION INTRA-ARTICULAR; INTRALESIONAL; INTRAMUSCULAR; INTRAVENOUS; SOFT TISSUE at 01:04

## 2021-04-01 RX ADMIN — ROCURONIUM BROMIDE 10 MG: 10 INJECTION, SOLUTION INTRAVENOUS at 02:04

## 2021-04-01 RX ADMIN — ONDANSETRON 4 MG: 2 INJECTION, SOLUTION INTRAMUSCULAR; INTRAVENOUS at 02:04

## 2021-04-01 RX ADMIN — MIDAZOLAM HYDROCHLORIDE 2 MG: 1 INJECTION, SOLUTION INTRAMUSCULAR; INTRAVENOUS at 01:04

## 2021-04-01 RX ADMIN — ACETAMINOPHEN 1000 MG: 10 INJECTION INTRAVENOUS at 04:04

## 2021-04-01 RX ADMIN — PROPOFOL 150 MG: 10 INJECTION, EMULSION INTRAVENOUS at 01:04

## 2021-04-01 RX ADMIN — ROCURONIUM BROMIDE 10 MG: 10 INJECTION, SOLUTION INTRAVENOUS at 01:04

## 2021-04-01 RX ADMIN — SUGAMMADEX 200 MG: 100 INJECTION, SOLUTION INTRAVENOUS at 04:04

## 2021-04-01 RX ADMIN — FENTANYL CITRATE 50 MCG: 50 INJECTION, SOLUTION INTRAMUSCULAR; INTRAVENOUS at 03:04

## 2021-04-01 RX ADMIN — ROCURONIUM BROMIDE 40 MG: 10 INJECTION, SOLUTION INTRAVENOUS at 01:04

## 2021-04-01 RX ADMIN — ROCURONIUM BROMIDE 10 MG: 10 INJECTION, SOLUTION INTRAVENOUS at 03:04

## 2021-04-01 RX ADMIN — LIDOCAINE HYDROCHLORIDE 60 MG: 20 INJECTION, SOLUTION INTRAVENOUS at 01:04

## 2021-04-01 RX ADMIN — ROCURONIUM BROMIDE 10 MG: 10 INJECTION, SOLUTION INTRAVENOUS at 04:04

## 2021-04-01 RX ADMIN — FENTANYL CITRATE 100 MCG: 50 INJECTION, SOLUTION INTRAMUSCULAR; INTRAVENOUS at 01:04

## 2021-04-01 RX ADMIN — LIDOCAINE HYDROCHLORIDE 10 MG: 10 INJECTION, SOLUTION EPIDURAL; INFILTRATION; INTRACAUDAL; PERINEURAL at 11:04

## 2021-04-01 RX ADMIN — PROPOFOL 50 MG: 10 INJECTION, EMULSION INTRAVENOUS at 01:04

## 2021-04-01 RX ADMIN — FENTANYL CITRATE 50 MCG: 50 INJECTION, SOLUTION INTRAMUSCULAR; INTRAVENOUS at 01:04

## 2021-04-05 ENCOUNTER — PATIENT MESSAGE (OUTPATIENT)
Dept: ADMINISTRATIVE | Facility: HOSPITAL | Age: 71
End: 2021-04-05

## 2021-04-06 ENCOUNTER — OFFICE VISIT (OUTPATIENT)
Dept: ENDOCRINOLOGY | Facility: CLINIC | Age: 71
End: 2021-04-06
Payer: MEDICARE

## 2021-04-06 VITALS
HEART RATE: 78 BPM | WEIGHT: 149.69 LBS | TEMPERATURE: 98 F | BODY MASS INDEX: 23.49 KG/M2 | HEIGHT: 67 IN | DIASTOLIC BLOOD PRESSURE: 80 MMHG | OXYGEN SATURATION: 98 % | SYSTOLIC BLOOD PRESSURE: 130 MMHG

## 2021-04-06 DIAGNOSIS — I10 BENIGN HYPERTENSION: ICD-10-CM

## 2021-04-06 DIAGNOSIS — E78.5 HYPERLIPIDEMIA, UNSPECIFIED HYPERLIPIDEMIA TYPE: ICD-10-CM

## 2021-04-06 DIAGNOSIS — E55.9 HYPOVITAMINOSIS D: ICD-10-CM

## 2021-04-06 DIAGNOSIS — Z78.9 STATIN INTOLERANCE: ICD-10-CM

## 2021-04-06 DIAGNOSIS — E11.65 TYPE 2 DIABETES MELLITUS WITH HYPERGLYCEMIA, WITH LONG-TERM CURRENT USE OF INSULIN: Primary | ICD-10-CM

## 2021-04-06 DIAGNOSIS — Z79.4 TYPE 2 DIABETES MELLITUS WITH HYPERGLYCEMIA, WITH LONG-TERM CURRENT USE OF INSULIN: Primary | ICD-10-CM

## 2021-04-06 DIAGNOSIS — B35.1 ONYCHOMYCOSIS: ICD-10-CM

## 2021-04-06 DIAGNOSIS — N52.9 ERECTILE DYSFUNCTION, UNSPECIFIED ERECTILE DYSFUNCTION TYPE: ICD-10-CM

## 2021-04-06 PROCEDURE — 99999 PR PBB SHADOW E&M-EST. PATIENT-LVL V: ICD-10-PCS | Mod: PBBFAC,,, | Performed by: PHYSICIAN ASSISTANT

## 2021-04-06 PROCEDURE — 3079F PR MOST RECENT DIASTOLIC BLOOD PRESSURE 80-89 MM HG: ICD-10-PCS | Mod: CPTII,S$GLB,, | Performed by: PHYSICIAN ASSISTANT

## 2021-04-06 PROCEDURE — 3072F PR LOW RISK FOR RETINOPATHY: ICD-10-PCS | Mod: S$GLB,,, | Performed by: PHYSICIAN ASSISTANT

## 2021-04-06 PROCEDURE — 3288F FALL RISK ASSESSMENT DOCD: CPT | Mod: CPTII,S$GLB,, | Performed by: PHYSICIAN ASSISTANT

## 2021-04-06 PROCEDURE — 1159F MED LIST DOCD IN RCRD: CPT | Mod: S$GLB,,, | Performed by: PHYSICIAN ASSISTANT

## 2021-04-06 PROCEDURE — 1159F PR MEDICATION LIST DOCUMENTED IN MEDICAL RECORD: ICD-10-PCS | Mod: S$GLB,,, | Performed by: PHYSICIAN ASSISTANT

## 2021-04-06 PROCEDURE — 3044F PR MOST RECENT HEMOGLOBIN A1C LEVEL <7.0%: ICD-10-PCS | Mod: CPTII,S$GLB,, | Performed by: PHYSICIAN ASSISTANT

## 2021-04-06 PROCEDURE — 3044F HG A1C LEVEL LT 7.0%: CPT | Mod: CPTII,S$GLB,, | Performed by: PHYSICIAN ASSISTANT

## 2021-04-06 PROCEDURE — 1125F PR PAIN SEVERITY QUANTIFIED, PAIN PRESENT: ICD-10-PCS | Mod: S$GLB,,, | Performed by: PHYSICIAN ASSISTANT

## 2021-04-06 PROCEDURE — 99214 OFFICE O/P EST MOD 30 MIN: CPT | Mod: S$GLB,,, | Performed by: PHYSICIAN ASSISTANT

## 2021-04-06 PROCEDURE — 3008F PR BODY MASS INDEX (BMI) DOCUMENTED: ICD-10-PCS | Mod: CPTII,S$GLB,, | Performed by: PHYSICIAN ASSISTANT

## 2021-04-06 PROCEDURE — 1101F PR PT FALLS ASSESS DOC 0-1 FALLS W/OUT INJ PAST YR: ICD-10-PCS | Mod: CPTII,S$GLB,, | Performed by: PHYSICIAN ASSISTANT

## 2021-04-06 PROCEDURE — 99999 PR PBB SHADOW E&M-EST. PATIENT-LVL V: CPT | Mod: PBBFAC,,, | Performed by: PHYSICIAN ASSISTANT

## 2021-04-06 PROCEDURE — 99214 PR OFFICE/OUTPT VISIT, EST, LEVL IV, 30-39 MIN: ICD-10-PCS | Mod: S$GLB,,, | Performed by: PHYSICIAN ASSISTANT

## 2021-04-06 PROCEDURE — 3075F PR MOST RECENT SYSTOLIC BLOOD PRESS GE 130-139MM HG: ICD-10-PCS | Mod: CPTII,S$GLB,, | Performed by: PHYSICIAN ASSISTANT

## 2021-04-06 PROCEDURE — 3075F SYST BP GE 130 - 139MM HG: CPT | Mod: CPTII,S$GLB,, | Performed by: PHYSICIAN ASSISTANT

## 2021-04-06 PROCEDURE — 1125F AMNT PAIN NOTED PAIN PRSNT: CPT | Mod: S$GLB,,, | Performed by: PHYSICIAN ASSISTANT

## 2021-04-06 PROCEDURE — 3008F BODY MASS INDEX DOCD: CPT | Mod: CPTII,S$GLB,, | Performed by: PHYSICIAN ASSISTANT

## 2021-04-06 PROCEDURE — 3079F DIAST BP 80-89 MM HG: CPT | Mod: CPTII,S$GLB,, | Performed by: PHYSICIAN ASSISTANT

## 2021-04-06 PROCEDURE — 1101F PT FALLS ASSESS-DOCD LE1/YR: CPT | Mod: CPTII,S$GLB,, | Performed by: PHYSICIAN ASSISTANT

## 2021-04-06 PROCEDURE — 3072F LOW RISK FOR RETINOPATHY: CPT | Mod: S$GLB,,, | Performed by: PHYSICIAN ASSISTANT

## 2021-04-06 PROCEDURE — 3288F PR FALLS RISK ASSESSMENT DOCUMENTED: ICD-10-PCS | Mod: CPTII,S$GLB,, | Performed by: PHYSICIAN ASSISTANT

## 2021-04-15 LAB
COMMENT: NORMAL
FINAL PATHOLOGIC DIAGNOSIS: NORMAL
FROZEN SECTION DIAGNOSIS: NORMAL
FROZEN SECTION FOOTNOTE: NORMAL
GROSS: NORMAL
Lab: NORMAL
MICROSCOPIC EXAM: NORMAL

## 2021-04-19 ENCOUNTER — PATIENT MESSAGE (OUTPATIENT)
Dept: OTOLARYNGOLOGY | Facility: CLINIC | Age: 71
End: 2021-04-19

## 2021-04-19 ENCOUNTER — OFFICE VISIT (OUTPATIENT)
Dept: OTOLARYNGOLOGY | Facility: CLINIC | Age: 71
End: 2021-04-19
Payer: MEDICARE

## 2021-04-19 VITALS — HEART RATE: 71 BPM | SYSTOLIC BLOOD PRESSURE: 133 MMHG | DIASTOLIC BLOOD PRESSURE: 72 MMHG

## 2021-04-19 DIAGNOSIS — C32.9 LARYNX NEOPLASM MALIGNANT: Primary | ICD-10-CM

## 2021-04-19 DIAGNOSIS — R49.0 DYSPHONIA: ICD-10-CM

## 2021-04-19 PROCEDURE — 3288F FALL RISK ASSESSMENT DOCD: CPT | Mod: CPTII,S$GLB,, | Performed by: OTOLARYNGOLOGY

## 2021-04-19 PROCEDURE — 99213 PR OFFICE/OUTPT VISIT, EST, LEVL III, 20-29 MIN: ICD-10-PCS | Mod: 25,S$GLB,, | Performed by: OTOLARYNGOLOGY

## 2021-04-19 PROCEDURE — 31579 PR LARYNGOSCOPY, FLEX/RIGID TELESCOPIC, W/STROBOSCOPY: ICD-10-PCS | Mod: S$GLB,,, | Performed by: OTOLARYNGOLOGY

## 2021-04-19 PROCEDURE — 1159F MED LIST DOCD IN RCRD: CPT | Mod: S$GLB,,, | Performed by: OTOLARYNGOLOGY

## 2021-04-19 PROCEDURE — 1159F PR MEDICATION LIST DOCUMENTED IN MEDICAL RECORD: ICD-10-PCS | Mod: S$GLB,,, | Performed by: OTOLARYNGOLOGY

## 2021-04-19 PROCEDURE — 1126F PR PAIN SEVERITY QUANTIFIED, NO PAIN PRESENT: ICD-10-PCS | Mod: S$GLB,,, | Performed by: OTOLARYNGOLOGY

## 2021-04-19 PROCEDURE — 1126F AMNT PAIN NOTED NONE PRSNT: CPT | Mod: S$GLB,,, | Performed by: OTOLARYNGOLOGY

## 2021-04-19 PROCEDURE — 1101F PR PT FALLS ASSESS DOC 0-1 FALLS W/OUT INJ PAST YR: ICD-10-PCS | Mod: CPTII,S$GLB,, | Performed by: OTOLARYNGOLOGY

## 2021-04-19 PROCEDURE — 3072F PR LOW RISK FOR RETINOPATHY: ICD-10-PCS | Mod: S$GLB,,, | Performed by: OTOLARYNGOLOGY

## 2021-04-19 PROCEDURE — 99999 PR PBB SHADOW E&M-EST. PATIENT-LVL V: ICD-10-PCS | Mod: PBBFAC,,, | Performed by: OTOLARYNGOLOGY

## 2021-04-19 PROCEDURE — 99213 OFFICE O/P EST LOW 20 MIN: CPT | Mod: 25,S$GLB,, | Performed by: OTOLARYNGOLOGY

## 2021-04-19 PROCEDURE — 3072F LOW RISK FOR RETINOPATHY: CPT | Mod: S$GLB,,, | Performed by: OTOLARYNGOLOGY

## 2021-04-19 PROCEDURE — 31579 LARYNGOSCOPY TELESCOPIC: CPT | Mod: S$GLB,,, | Performed by: OTOLARYNGOLOGY

## 2021-04-19 PROCEDURE — 1101F PT FALLS ASSESS-DOCD LE1/YR: CPT | Mod: CPTII,S$GLB,, | Performed by: OTOLARYNGOLOGY

## 2021-04-19 PROCEDURE — 3288F PR FALLS RISK ASSESSMENT DOCUMENTED: ICD-10-PCS | Mod: CPTII,S$GLB,, | Performed by: OTOLARYNGOLOGY

## 2021-04-19 PROCEDURE — 99999 PR PBB SHADOW E&M-EST. PATIENT-LVL V: CPT | Mod: PBBFAC,,, | Performed by: OTOLARYNGOLOGY

## 2021-04-19 RX ORDER — LIDOCAINE HYDROCHLORIDE 10 MG/ML
1 INJECTION, SOLUTION EPIDURAL; INFILTRATION; INTRACAUDAL; PERINEURAL ONCE
Status: CANCELLED | OUTPATIENT
Start: 2021-04-19 | End: 2021-04-19

## 2021-04-19 RX ORDER — DEXAMETHASONE SODIUM PHOSPHATE 4 MG/ML
8 INJECTION, SOLUTION INTRA-ARTICULAR; INTRALESIONAL; INTRAMUSCULAR; INTRAVENOUS; SOFT TISSUE
Status: CANCELLED | OUTPATIENT
Start: 2021-04-19

## 2021-04-23 RX ORDER — DILTIAZEM HYDROCHLORIDE 120 MG/1
120 CAPSULE, COATED, EXTENDED RELEASE ORAL DAILY
Qty: 90 CAPSULE | Refills: 3 | Status: ON HOLD | OUTPATIENT
Start: 2021-04-23 | End: 2022-01-14 | Stop reason: HOSPADM

## 2021-04-23 NOTE — TELEPHONE ENCOUNTER
----- Message from Brenna Romero sent at 4/23/2021 12:53 PM CDT -----  Regarding: medication  Cyrus Batres calling regarding Refills  (message) for #diltiaZEM (CARDIZEM CD) 120 MG Cp24  send refill/new prescription  to [facility]

## 2021-04-28 ENCOUNTER — PATIENT MESSAGE (OUTPATIENT)
Dept: SURGERY | Facility: HOSPITAL | Age: 71
End: 2021-04-28

## 2021-04-28 DIAGNOSIS — Z03.818 ENCOUNTER FOR OBSERVATION FOR SUSPECTED EXPOSURE TO OTHER BIOLOGICAL AGENTS RULED OUT: ICD-10-CM

## 2021-05-03 ENCOUNTER — LAB VISIT (OUTPATIENT)
Dept: PRIMARY CARE CLINIC | Facility: CLINIC | Age: 71
End: 2021-05-03
Payer: MEDICARE

## 2021-05-03 DIAGNOSIS — Z01.818 PRE-OP TESTING: Primary | ICD-10-CM

## 2021-05-03 DIAGNOSIS — Z03.818 ENCOUNTER FOR OBSERVATION FOR SUSPECTED EXPOSURE TO OTHER BIOLOGICAL AGENTS RULED OUT: ICD-10-CM

## 2021-05-03 PROCEDURE — U0005 INFEC AGEN DETEC AMPLI PROBE: HCPCS | Performed by: OTOLARYNGOLOGY

## 2021-05-03 PROCEDURE — U0003 INFECTIOUS AGENT DETECTION BY NUCLEIC ACID (DNA OR RNA); SEVERE ACUTE RESPIRATORY SYNDROME CORONAVIRUS 2 (SARS-COV-2) (CORONAVIRUS DISEASE [COVID-19]), AMPLIFIED PROBE TECHNIQUE, MAKING USE OF HIGH THROUGHPUT TECHNOLOGIES AS DESCRIBED BY CMS-2020-01-R: HCPCS | Performed by: OTOLARYNGOLOGY

## 2021-05-04 LAB — SARS-COV-2 RNA RESP QL NAA+PROBE: NOT DETECTED

## 2021-05-05 ENCOUNTER — TELEPHONE (OUTPATIENT)
Dept: OTOLARYNGOLOGY | Facility: CLINIC | Age: 71
End: 2021-05-05

## 2021-05-06 ENCOUNTER — ANESTHESIA (OUTPATIENT)
Dept: SURGERY | Facility: HOSPITAL | Age: 71
End: 2021-05-06
Payer: MEDICARE

## 2021-05-06 ENCOUNTER — HOSPITAL ENCOUNTER (OUTPATIENT)
Facility: HOSPITAL | Age: 71
Discharge: HOME | End: 2021-05-06
Attending: OTOLARYNGOLOGY | Admitting: OTOLARYNGOLOGY
Payer: MEDICARE

## 2021-05-06 ENCOUNTER — ANESTHESIA EVENT (OUTPATIENT)
Dept: SURGERY | Facility: HOSPITAL | Age: 71
End: 2021-05-06
Payer: MEDICARE

## 2021-05-06 VITALS
WEIGHT: 143 LBS | OXYGEN SATURATION: 100 % | TEMPERATURE: 98 F | SYSTOLIC BLOOD PRESSURE: 137 MMHG | BODY MASS INDEX: 22.98 KG/M2 | DIASTOLIC BLOOD PRESSURE: 76 MMHG | HEART RATE: 72 BPM | HEIGHT: 66 IN | RESPIRATION RATE: 16 BRPM

## 2021-05-06 DIAGNOSIS — C32.9 LARYNX NEOPLASM MALIGNANT: Primary | ICD-10-CM

## 2021-05-06 DIAGNOSIS — Z01.818 PRE-OP TESTING: ICD-10-CM

## 2021-05-06 LAB
HGB BLD-MCNC: 12.4 G/DL (ref 14–18)
POCT GLUCOSE: 130 MG/DL (ref 70–110)

## 2021-05-06 PROCEDURE — 31541 LARYNSCOP W/TUMR EXC + SCOPE: CPT | Mod: ,,, | Performed by: OTOLARYNGOLOGY

## 2021-05-06 PROCEDURE — D9220A PRA ANESTHESIA: Mod: CRNA,,, | Performed by: NURSE ANESTHETIST, CERTIFIED REGISTERED

## 2021-05-06 PROCEDURE — 71000045 HC DOSC ROUTINE RECOVERY EA ADD'L HR: Performed by: OTOLARYNGOLOGY

## 2021-05-06 PROCEDURE — D9220A PRA ANESTHESIA: Mod: ANES,,, | Performed by: ANESTHESIOLOGY

## 2021-05-06 PROCEDURE — 27201423 OPTIME MED/SURG SUP & DEVICES STERILE SUPPLY: Performed by: OTOLARYNGOLOGY

## 2021-05-06 PROCEDURE — 88331 PATH CONSLTJ SURG 1 BLK 1SPC: CPT | Performed by: PATHOLOGY

## 2021-05-06 PROCEDURE — 71000015 HC POSTOP RECOV 1ST HR: Performed by: OTOLARYNGOLOGY

## 2021-05-06 PROCEDURE — 88331 PATH CONSLTJ SURG 1 BLK 1SPC: CPT | Mod: 26,,, | Performed by: PATHOLOGY

## 2021-05-06 PROCEDURE — 82962 GLUCOSE BLOOD TEST: CPT | Performed by: OTOLARYNGOLOGY

## 2021-05-06 PROCEDURE — 63600175 PHARM REV CODE 636 W HCPCS: Performed by: OTOLARYNGOLOGY

## 2021-05-06 PROCEDURE — 31541 PR LARYNGOSCOPY,DIRCT,OP SCOP,EXC TUMR: ICD-10-PCS | Mod: ,,, | Performed by: OTOLARYNGOLOGY

## 2021-05-06 PROCEDURE — 88305 TISSUE EXAM BY PATHOLOGIST: CPT | Performed by: PATHOLOGY

## 2021-05-06 PROCEDURE — D9220A PRA ANESTHESIA: ICD-10-PCS | Mod: CRNA,,, | Performed by: NURSE ANESTHETIST, CERTIFIED REGISTERED

## 2021-05-06 PROCEDURE — 36000709 HC OR TIME LEV III EA ADD 15 MIN: Performed by: OTOLARYNGOLOGY

## 2021-05-06 PROCEDURE — 37000008 HC ANESTHESIA 1ST 15 MINUTES: Performed by: OTOLARYNGOLOGY

## 2021-05-06 PROCEDURE — 63600175 PHARM REV CODE 636 W HCPCS: Performed by: NURSE ANESTHETIST, CERTIFIED REGISTERED

## 2021-05-06 PROCEDURE — 88331 PR  PATH CONSULT IN SURG,W FRZ SEC: ICD-10-PCS | Mod: 26,,, | Performed by: PATHOLOGY

## 2021-05-06 PROCEDURE — 71000044 HC DOSC ROUTINE RECOVERY FIRST HOUR: Performed by: OTOLARYNGOLOGY

## 2021-05-06 PROCEDURE — 37000009 HC ANESTHESIA EA ADD 15 MINS: Performed by: OTOLARYNGOLOGY

## 2021-05-06 PROCEDURE — 85018 HEMOGLOBIN: CPT | Performed by: ANESTHESIOLOGY

## 2021-05-06 PROCEDURE — 25000003 PHARM REV CODE 250: Performed by: NURSE ANESTHETIST, CERTIFIED REGISTERED

## 2021-05-06 PROCEDURE — 36000708 HC OR TIME LEV III 1ST 15 MIN: Performed by: OTOLARYNGOLOGY

## 2021-05-06 PROCEDURE — 88305 TISSUE EXAM BY PATHOLOGIST: ICD-10-PCS | Mod: 26,,, | Performed by: PATHOLOGY

## 2021-05-06 PROCEDURE — D9220A PRA ANESTHESIA: ICD-10-PCS | Mod: ANES,,, | Performed by: ANESTHESIOLOGY

## 2021-05-06 PROCEDURE — 25000003 PHARM REV CODE 250: Performed by: OTOLARYNGOLOGY

## 2021-05-06 PROCEDURE — 88305 TISSUE EXAM BY PATHOLOGIST: CPT | Mod: 26,,, | Performed by: PATHOLOGY

## 2021-05-06 RX ORDER — DEXAMETHASONE SODIUM PHOSPHATE 4 MG/ML
INJECTION, SOLUTION INTRA-ARTICULAR; INTRALESIONAL; INTRAMUSCULAR; INTRAVENOUS; SOFT TISSUE
Status: DISCONTINUED | OUTPATIENT
Start: 2021-05-06 | End: 2021-05-06

## 2021-05-06 RX ORDER — LORAZEPAM 2 MG/ML
0.25 INJECTION INTRAMUSCULAR ONCE AS NEEDED
Status: DISCONTINUED | OUTPATIENT
Start: 2021-05-06 | End: 2021-05-06 | Stop reason: HOSPADM

## 2021-05-06 RX ORDER — EPINEPHRINE 1 MG/ML
INJECTION, SOLUTION INTRACARDIAC; INTRAMUSCULAR; INTRAVENOUS; SUBCUTANEOUS
Status: DISCONTINUED | OUTPATIENT
Start: 2021-05-06 | End: 2021-05-06 | Stop reason: HOSPADM

## 2021-05-06 RX ORDER — PHENYLEPHRINE HCL IN 0.9% NACL 1 MG/10 ML
SYRINGE (ML) INTRAVENOUS
Status: DISCONTINUED | OUTPATIENT
Start: 2021-05-06 | End: 2021-05-06

## 2021-05-06 RX ORDER — AMOXICILLIN AND CLAVULANATE POTASSIUM 250; 62.5 MG/5ML; MG/5ML
600 POWDER, FOR SUSPENSION ORAL EVERY 12 HOURS
Qty: 300 ML | Refills: 0 | Status: SHIPPED | OUTPATIENT
Start: 2021-05-06 | End: 2021-05-16

## 2021-05-06 RX ORDER — ONDANSETRON 2 MG/ML
INJECTION INTRAMUSCULAR; INTRAVENOUS
Status: DISCONTINUED | OUTPATIENT
Start: 2021-05-06 | End: 2021-05-06

## 2021-05-06 RX ORDER — FENTANYL CITRATE 50 UG/ML
INJECTION, SOLUTION INTRAMUSCULAR; INTRAVENOUS
Status: DISCONTINUED | OUTPATIENT
Start: 2021-05-06 | End: 2021-05-06

## 2021-05-06 RX ORDER — LIDOCAINE HYDROCHLORIDE 10 MG/ML
1 INJECTION, SOLUTION EPIDURAL; INFILTRATION; INTRACAUDAL; PERINEURAL ONCE
Status: DISCONTINUED | OUTPATIENT
Start: 2021-05-06 | End: 2021-05-06 | Stop reason: HOSPADM

## 2021-05-06 RX ORDER — LIDOCAINE HYDROCHLORIDE 40 MG/ML
INJECTION, SOLUTION RETROBULBAR
Status: DISCONTINUED | OUTPATIENT
Start: 2021-05-06 | End: 2021-05-06 | Stop reason: HOSPADM

## 2021-05-06 RX ORDER — MIDAZOLAM HYDROCHLORIDE 1 MG/ML
INJECTION, SOLUTION INTRAMUSCULAR; INTRAVENOUS
Status: DISCONTINUED | OUTPATIENT
Start: 2021-05-06 | End: 2021-05-06

## 2021-05-06 RX ORDER — HYDROCODONE BITARTRATE AND ACETAMINOPHEN 7.5; 325 MG/15ML; MG/15ML
15 SOLUTION ORAL EVERY 6 HOURS PRN
Qty: 473 ML | Refills: 0 | Status: SHIPPED | OUTPATIENT
Start: 2021-05-06 | End: 2021-07-22

## 2021-05-06 RX ORDER — FENTANYL CITRATE 50 UG/ML
25 INJECTION, SOLUTION INTRAMUSCULAR; INTRAVENOUS EVERY 5 MIN PRN
Status: DISCONTINUED | OUTPATIENT
Start: 2021-05-06 | End: 2021-05-06 | Stop reason: HOSPADM

## 2021-05-06 RX ORDER — DIPHENHYDRAMINE HYDROCHLORIDE 50 MG/ML
25 INJECTION INTRAMUSCULAR; INTRAVENOUS EVERY 6 HOURS PRN
Status: DISCONTINUED | OUTPATIENT
Start: 2021-05-06 | End: 2021-05-06 | Stop reason: HOSPADM

## 2021-05-06 RX ORDER — ROCURONIUM BROMIDE 10 MG/ML
INJECTION, SOLUTION INTRAVENOUS
Status: DISCONTINUED | OUTPATIENT
Start: 2021-05-06 | End: 2021-05-06

## 2021-05-06 RX ORDER — LIDOCAINE HYDROCHLORIDE 20 MG/ML
INJECTION, SOLUTION EPIDURAL; INFILTRATION; INTRACAUDAL; PERINEURAL
Status: DISCONTINUED | OUTPATIENT
Start: 2021-05-06 | End: 2021-05-06

## 2021-05-06 RX ORDER — AMOXICILLIN AND CLAVULANATE POTASSIUM 875; 125 MG/1; MG/1
1 TABLET, FILM COATED ORAL EVERY 12 HOURS
Qty: 20 TABLET | Refills: 0 | Status: SHIPPED | OUTPATIENT
Start: 2021-05-06 | End: 2021-05-06 | Stop reason: HOSPADM

## 2021-05-06 RX ORDER — PROPOFOL 10 MG/ML
VIAL (ML) INTRAVENOUS
Status: DISCONTINUED | OUTPATIENT
Start: 2021-05-06 | End: 2021-05-06

## 2021-05-06 RX ADMIN — Medication 100 MCG: at 08:05

## 2021-05-06 RX ADMIN — FENTANYL CITRATE 50 MCG: 50 INJECTION INTRAMUSCULAR; INTRAVENOUS at 08:05

## 2021-05-06 RX ADMIN — MIDAZOLAM HYDROCHLORIDE 2 MG: 1 INJECTION, SOLUTION INTRAMUSCULAR; INTRAVENOUS at 08:05

## 2021-05-06 RX ADMIN — ROCURONIUM BROMIDE 10 MG: 10 INJECTION, SOLUTION INTRAVENOUS at 09:05

## 2021-05-06 RX ADMIN — DEXAMETHASONE SODIUM PHOSPHATE 8 MG: 4 INJECTION INTRA-ARTICULAR; INTRALESIONAL; INTRAMUSCULAR; INTRAVENOUS; SOFT TISSUE at 08:05

## 2021-05-06 RX ADMIN — FENTANYL CITRATE 50 MCG: 50 INJECTION INTRAMUSCULAR; INTRAVENOUS at 09:05

## 2021-05-06 RX ADMIN — SUGAMMADEX 200 MG: 100 INJECTION, SOLUTION INTRAVENOUS at 10:05

## 2021-05-06 RX ADMIN — ROCURONIUM BROMIDE 50 MG: 10 INJECTION, SOLUTION INTRAVENOUS at 08:05

## 2021-05-06 RX ADMIN — ONDANSETRON 4 MG: 2 INJECTION INTRAMUSCULAR; INTRAVENOUS at 10:05

## 2021-05-06 RX ADMIN — LIDOCAINE HYDROCHLORIDE 100 MG: 20 INJECTION, SOLUTION EPIDURAL; INFILTRATION; INTRACAUDAL at 08:05

## 2021-05-06 RX ADMIN — SODIUM CHLORIDE: 0.9 INJECTION, SOLUTION INTRAVENOUS at 08:05

## 2021-05-06 RX ADMIN — PROPOFOL 200 MG: 10 INJECTION, EMULSION INTRAVENOUS at 08:05

## 2021-05-12 ENCOUNTER — PATIENT MESSAGE (OUTPATIENT)
Dept: OTOLARYNGOLOGY | Facility: CLINIC | Age: 71
End: 2021-05-12

## 2021-06-14 ENCOUNTER — OFFICE VISIT (OUTPATIENT)
Dept: OTOLARYNGOLOGY | Facility: CLINIC | Age: 71
End: 2021-06-14
Payer: MEDICARE

## 2021-06-14 VITALS — DIASTOLIC BLOOD PRESSURE: 79 MMHG | SYSTOLIC BLOOD PRESSURE: 130 MMHG | HEART RATE: 83 BPM

## 2021-06-14 DIAGNOSIS — R49.0 DYSPHONIA: ICD-10-CM

## 2021-06-14 DIAGNOSIS — C32.9 LARYNX NEOPLASM MALIGNANT: Primary | ICD-10-CM

## 2021-06-14 PROCEDURE — 1101F PR PT FALLS ASSESS DOC 0-1 FALLS W/OUT INJ PAST YR: ICD-10-PCS | Mod: CPTII,S$GLB,, | Performed by: OTOLARYNGOLOGY

## 2021-06-14 PROCEDURE — 31575 DIAGNOSTIC LARYNGOSCOPY: CPT | Mod: S$GLB,,, | Performed by: OTOLARYNGOLOGY

## 2021-06-14 PROCEDURE — 3072F PR LOW RISK FOR RETINOPATHY: ICD-10-PCS | Mod: S$GLB,,, | Performed by: OTOLARYNGOLOGY

## 2021-06-14 PROCEDURE — 99999 PR PBB SHADOW E&M-EST. PATIENT-LVL IV: ICD-10-PCS | Mod: PBBFAC,,, | Performed by: OTOLARYNGOLOGY

## 2021-06-14 PROCEDURE — 1101F PT FALLS ASSESS-DOCD LE1/YR: CPT | Mod: CPTII,S$GLB,, | Performed by: OTOLARYNGOLOGY

## 2021-06-14 PROCEDURE — 3288F PR FALLS RISK ASSESSMENT DOCUMENTED: ICD-10-PCS | Mod: CPTII,S$GLB,, | Performed by: OTOLARYNGOLOGY

## 2021-06-14 PROCEDURE — 1126F AMNT PAIN NOTED NONE PRSNT: CPT | Mod: S$GLB,,, | Performed by: OTOLARYNGOLOGY

## 2021-06-14 PROCEDURE — 3288F FALL RISK ASSESSMENT DOCD: CPT | Mod: CPTII,S$GLB,, | Performed by: OTOLARYNGOLOGY

## 2021-06-14 PROCEDURE — 1159F PR MEDICATION LIST DOCUMENTED IN MEDICAL RECORD: ICD-10-PCS | Mod: S$GLB,,, | Performed by: OTOLARYNGOLOGY

## 2021-06-14 PROCEDURE — 99213 PR OFFICE/OUTPT VISIT, EST, LEVL III, 20-29 MIN: ICD-10-PCS | Mod: 25,S$GLB,, | Performed by: OTOLARYNGOLOGY

## 2021-06-14 PROCEDURE — 1126F PR PAIN SEVERITY QUANTIFIED, NO PAIN PRESENT: ICD-10-PCS | Mod: S$GLB,,, | Performed by: OTOLARYNGOLOGY

## 2021-06-14 PROCEDURE — 3072F LOW RISK FOR RETINOPATHY: CPT | Mod: S$GLB,,, | Performed by: OTOLARYNGOLOGY

## 2021-06-14 PROCEDURE — 99213 OFFICE O/P EST LOW 20 MIN: CPT | Mod: 25,S$GLB,, | Performed by: OTOLARYNGOLOGY

## 2021-06-14 PROCEDURE — 31575 PR LARYNGOSCOPY, FLEXIBLE; DIAGNOSTIC: ICD-10-PCS | Mod: S$GLB,,, | Performed by: OTOLARYNGOLOGY

## 2021-06-14 PROCEDURE — 1159F MED LIST DOCD IN RCRD: CPT | Mod: S$GLB,,, | Performed by: OTOLARYNGOLOGY

## 2021-06-14 PROCEDURE — 99999 PR PBB SHADOW E&M-EST. PATIENT-LVL IV: CPT | Mod: PBBFAC,,, | Performed by: OTOLARYNGOLOGY

## 2021-06-14 RX ORDER — AMOXICILLIN AND CLAVULANATE POTASSIUM 875; 125 MG/1; MG/1
1 TABLET, FILM COATED ORAL 2 TIMES DAILY
Qty: 20 TABLET | Refills: 0 | Status: SHIPPED | OUTPATIENT
Start: 2021-06-14 | End: 2021-06-23

## 2021-06-14 RX ORDER — METHYLPREDNISOLONE 4 MG/1
TABLET ORAL
Qty: 1 PACKAGE | Refills: 0 | Status: SHIPPED | OUTPATIENT
Start: 2021-06-14 | End: 2021-07-22

## 2021-06-23 ENCOUNTER — PATIENT MESSAGE (OUTPATIENT)
Dept: OTOLARYNGOLOGY | Facility: CLINIC | Age: 71
End: 2021-06-23

## 2021-06-23 RX ORDER — AMOXICILLIN AND CLAVULANATE POTASSIUM 400; 57 MG/5ML; MG/5ML
800 POWDER, FOR SUSPENSION ORAL EVERY 12 HOURS
Qty: 280 ML | Refills: 0 | Status: SHIPPED | OUTPATIENT
Start: 2021-06-23 | End: 2021-07-07

## 2021-07-01 ENCOUNTER — HOSPITAL ENCOUNTER (OUTPATIENT)
Dept: RADIOLOGY | Facility: HOSPITAL | Age: 71
Discharge: HOME | End: 2021-07-01
Attending: RADIOLOGY
Payer: MEDICARE

## 2021-07-01 DIAGNOSIS — C32.1 MALIGNANT NEOPLASM OF SUPRAGLOTTIS: ICD-10-CM

## 2021-07-01 LAB
CREAT SERPL-MCNC: 2 MG/DL (ref 0.5–1.4)
SAMPLE: ABNORMAL

## 2021-07-01 PROCEDURE — 25500020 PHARM REV CODE 255: Mod: PO | Performed by: RADIOLOGY

## 2021-07-01 PROCEDURE — 70491 CT SOFT TISSUE NECK W/DYE: CPT | Mod: TC,PO

## 2021-07-01 PROCEDURE — 82565 ASSAY OF CREATININE: CPT | Mod: PO

## 2021-07-01 RX ADMIN — IOHEXOL 100 ML: 350 INJECTION, SOLUTION INTRAVENOUS at 03:07

## 2021-07-07 ENCOUNTER — PATIENT MESSAGE (OUTPATIENT)
Dept: ADMINISTRATIVE | Facility: HOSPITAL | Age: 71
End: 2021-07-07

## 2021-07-12 ENCOUNTER — TELEPHONE (OUTPATIENT)
Dept: RADIATION ONCOLOGY | Facility: CLINIC | Age: 71
End: 2021-07-12

## 2021-07-12 DIAGNOSIS — C76.0 MALIGNANT NEOPLASM OF HEAD, FACE AND NECK: Primary | ICD-10-CM

## 2021-07-19 ENCOUNTER — OFFICE VISIT (OUTPATIENT)
Dept: OTOLARYNGOLOGY | Facility: CLINIC | Age: 71
End: 2021-07-19
Payer: MEDICARE

## 2021-07-19 VITALS — DIASTOLIC BLOOD PRESSURE: 67 MMHG | SYSTOLIC BLOOD PRESSURE: 123 MMHG | HEART RATE: 64 BPM

## 2021-07-19 DIAGNOSIS — R49.0 DYSPHONIA: ICD-10-CM

## 2021-07-19 DIAGNOSIS — C32.9 MALIGNANT NEOPLASM DETERMINED BY BIOPSY OF LARYNX: Primary | ICD-10-CM

## 2021-07-19 PROCEDURE — 31579 LARYNGOSCOPY TELESCOPIC: CPT | Mod: S$GLB,,, | Performed by: OTOLARYNGOLOGY

## 2021-07-19 PROCEDURE — 99213 PR OFFICE/OUTPT VISIT, EST, LEVL III, 20-29 MIN: ICD-10-PCS | Mod: 25,S$GLB,, | Performed by: OTOLARYNGOLOGY

## 2021-07-19 PROCEDURE — 1101F PR PT FALLS ASSESS DOC 0-1 FALLS W/OUT INJ PAST YR: ICD-10-PCS | Mod: CPTII,S$GLB,, | Performed by: OTOLARYNGOLOGY

## 2021-07-19 PROCEDURE — 31579 PR LARYNGOSCOPY, FLEX/RIGID TELESCOPIC, W/STROBOSCOPY: ICD-10-PCS | Mod: S$GLB,,, | Performed by: OTOLARYNGOLOGY

## 2021-07-19 PROCEDURE — 1101F PT FALLS ASSESS-DOCD LE1/YR: CPT | Mod: CPTII,S$GLB,, | Performed by: OTOLARYNGOLOGY

## 2021-07-19 PROCEDURE — 1126F PR PAIN SEVERITY QUANTIFIED, NO PAIN PRESENT: ICD-10-PCS | Mod: CPTII,S$GLB,, | Performed by: OTOLARYNGOLOGY

## 2021-07-19 PROCEDURE — 3072F PR LOW RISK FOR RETINOPATHY: ICD-10-PCS | Mod: CPTII,S$GLB,, | Performed by: OTOLARYNGOLOGY

## 2021-07-19 PROCEDURE — 3288F FALL RISK ASSESSMENT DOCD: CPT | Mod: CPTII,S$GLB,, | Performed by: OTOLARYNGOLOGY

## 2021-07-19 PROCEDURE — 99999 PR PBB SHADOW E&M-EST. PATIENT-LVL III: CPT | Mod: PBBFAC,,, | Performed by: OTOLARYNGOLOGY

## 2021-07-19 PROCEDURE — 1159F MED LIST DOCD IN RCRD: CPT | Mod: CPTII,S$GLB,, | Performed by: OTOLARYNGOLOGY

## 2021-07-19 PROCEDURE — 3072F LOW RISK FOR RETINOPATHY: CPT | Mod: CPTII,S$GLB,, | Performed by: OTOLARYNGOLOGY

## 2021-07-19 PROCEDURE — 3288F PR FALLS RISK ASSESSMENT DOCUMENTED: ICD-10-PCS | Mod: CPTII,S$GLB,, | Performed by: OTOLARYNGOLOGY

## 2021-07-19 PROCEDURE — 1159F PR MEDICATION LIST DOCUMENTED IN MEDICAL RECORD: ICD-10-PCS | Mod: CPTII,S$GLB,, | Performed by: OTOLARYNGOLOGY

## 2021-07-19 PROCEDURE — 99213 OFFICE O/P EST LOW 20 MIN: CPT | Mod: 25,S$GLB,, | Performed by: OTOLARYNGOLOGY

## 2021-07-19 PROCEDURE — 1126F AMNT PAIN NOTED NONE PRSNT: CPT | Mod: CPTII,S$GLB,, | Performed by: OTOLARYNGOLOGY

## 2021-07-19 PROCEDURE — 99999 PR PBB SHADOW E&M-EST. PATIENT-LVL III: ICD-10-PCS | Mod: PBBFAC,,, | Performed by: OTOLARYNGOLOGY

## 2021-07-22 ENCOUNTER — OFFICE VISIT (OUTPATIENT)
Dept: CARDIOLOGY | Facility: CLINIC | Age: 71
End: 2021-07-22
Payer: MEDICARE

## 2021-07-22 VITALS
HEIGHT: 66 IN | WEIGHT: 151 LBS | OXYGEN SATURATION: 100 % | DIASTOLIC BLOOD PRESSURE: 70 MMHG | SYSTOLIC BLOOD PRESSURE: 132 MMHG | RESPIRATION RATE: 16 BRPM | HEART RATE: 71 BPM | BODY MASS INDEX: 24.27 KG/M2

## 2021-07-22 DIAGNOSIS — I10 BENIGN HYPERTENSION: ICD-10-CM

## 2021-07-22 DIAGNOSIS — I47.29 NSVT (NONSUSTAINED VENTRICULAR TACHYCARDIA): ICD-10-CM

## 2021-07-22 DIAGNOSIS — C32.9 LARYNX NEOPLASM MALIGNANT: ICD-10-CM

## 2021-07-22 DIAGNOSIS — I48.0 PAROXYSMAL ATRIAL FIBRILLATION: Primary | ICD-10-CM

## 2021-07-22 DIAGNOSIS — Z78.9 NON-SMOKER: ICD-10-CM

## 2021-07-22 DIAGNOSIS — E11.618 TYPE 2 DIABETES MELLITUS WITH OTHER DIABETIC ARTHROPATHY, WITH LONG-TERM CURRENT USE OF INSULIN: ICD-10-CM

## 2021-07-22 DIAGNOSIS — E78.5 HYPERLIPIDEMIA, UNSPECIFIED HYPERLIPIDEMIA TYPE: ICD-10-CM

## 2021-07-22 DIAGNOSIS — Z78.9 STATIN INTOLERANCE: ICD-10-CM

## 2021-07-22 DIAGNOSIS — Z79.4 TYPE 2 DIABETES MELLITUS WITH OTHER DIABETIC ARTHROPATHY, WITH LONG-TERM CURRENT USE OF INSULIN: ICD-10-CM

## 2021-07-22 PROCEDURE — 3072F PR LOW RISK FOR RETINOPATHY: ICD-10-PCS | Mod: CPTII,S$GLB,, | Performed by: NURSE PRACTITIONER

## 2021-07-22 PROCEDURE — 3078F PR MOST RECENT DIASTOLIC BLOOD PRESSURE < 80 MM HG: ICD-10-PCS | Mod: CPTII,S$GLB,, | Performed by: NURSE PRACTITIONER

## 2021-07-22 PROCEDURE — 1126F AMNT PAIN NOTED NONE PRSNT: CPT | Mod: CPTII,S$GLB,, | Performed by: NURSE PRACTITIONER

## 2021-07-22 PROCEDURE — 3288F FALL RISK ASSESSMENT DOCD: CPT | Mod: CPTII,S$GLB,, | Performed by: NURSE PRACTITIONER

## 2021-07-22 PROCEDURE — 3075F SYST BP GE 130 - 139MM HG: CPT | Mod: CPTII,S$GLB,, | Performed by: NURSE PRACTITIONER

## 2021-07-22 PROCEDURE — 3008F PR BODY MASS INDEX (BMI) DOCUMENTED: ICD-10-PCS | Mod: CPTII,S$GLB,, | Performed by: NURSE PRACTITIONER

## 2021-07-22 PROCEDURE — 99214 OFFICE O/P EST MOD 30 MIN: CPT | Mod: S$GLB,,, | Performed by: NURSE PRACTITIONER

## 2021-07-22 PROCEDURE — 3075F PR MOST RECENT SYSTOLIC BLOOD PRESS GE 130-139MM HG: ICD-10-PCS | Mod: CPTII,S$GLB,, | Performed by: NURSE PRACTITIONER

## 2021-07-22 PROCEDURE — 3044F PR MOST RECENT HEMOGLOBIN A1C LEVEL <7.0%: ICD-10-PCS | Mod: CPTII,S$GLB,, | Performed by: NURSE PRACTITIONER

## 2021-07-22 PROCEDURE — 1101F PT FALLS ASSESS-DOCD LE1/YR: CPT | Mod: CPTII,S$GLB,, | Performed by: NURSE PRACTITIONER

## 2021-07-22 PROCEDURE — 3008F BODY MASS INDEX DOCD: CPT | Mod: CPTII,S$GLB,, | Performed by: NURSE PRACTITIONER

## 2021-07-22 PROCEDURE — 1101F PR PT FALLS ASSESS DOC 0-1 FALLS W/OUT INJ PAST YR: ICD-10-PCS | Mod: CPTII,S$GLB,, | Performed by: NURSE PRACTITIONER

## 2021-07-22 PROCEDURE — 3288F PR FALLS RISK ASSESSMENT DOCUMENTED: ICD-10-PCS | Mod: CPTII,S$GLB,, | Performed by: NURSE PRACTITIONER

## 2021-07-22 PROCEDURE — 99214 PR OFFICE/OUTPT VISIT, EST, LEVL IV, 30-39 MIN: ICD-10-PCS | Mod: S$GLB,,, | Performed by: NURSE PRACTITIONER

## 2021-07-22 PROCEDURE — 1159F PR MEDICATION LIST DOCUMENTED IN MEDICAL RECORD: ICD-10-PCS | Mod: CPTII,S$GLB,, | Performed by: NURSE PRACTITIONER

## 2021-07-22 PROCEDURE — 1159F MED LIST DOCD IN RCRD: CPT | Mod: CPTII,S$GLB,, | Performed by: NURSE PRACTITIONER

## 2021-07-22 PROCEDURE — 3044F HG A1C LEVEL LT 7.0%: CPT | Mod: CPTII,S$GLB,, | Performed by: NURSE PRACTITIONER

## 2021-07-22 PROCEDURE — 1126F PR PAIN SEVERITY QUANTIFIED, NO PAIN PRESENT: ICD-10-PCS | Mod: CPTII,S$GLB,, | Performed by: NURSE PRACTITIONER

## 2021-07-22 PROCEDURE — 3078F DIAST BP <80 MM HG: CPT | Mod: CPTII,S$GLB,, | Performed by: NURSE PRACTITIONER

## 2021-07-22 PROCEDURE — 3072F LOW RISK FOR RETINOPATHY: CPT | Mod: CPTII,S$GLB,, | Performed by: NURSE PRACTITIONER

## 2021-07-28 ENCOUNTER — PATIENT MESSAGE (OUTPATIENT)
Dept: ENDOCRINOLOGY | Facility: CLINIC | Age: 71
End: 2021-07-28

## 2021-07-28 DIAGNOSIS — E11.9 TYPE 2 DIABETES MELLITUS WITHOUT COMPLICATION, WITH LONG-TERM CURRENT USE OF INSULIN: ICD-10-CM

## 2021-07-28 DIAGNOSIS — Z79.4 TYPE 2 DIABETES MELLITUS WITHOUT COMPLICATION, WITH LONG-TERM CURRENT USE OF INSULIN: ICD-10-CM

## 2021-07-28 RX ORDER — PEN NEEDLE, DIABETIC 30 GX3/16"
NEEDLE, DISPOSABLE MISCELLANEOUS
Qty: 30 EACH | Refills: 1 | Status: SHIPPED | OUTPATIENT
Start: 2021-07-28 | End: 2021-10-04 | Stop reason: SDUPTHER

## 2021-07-29 ENCOUNTER — PATIENT MESSAGE (OUTPATIENT)
Dept: ENDOCRINOLOGY | Facility: CLINIC | Age: 71
End: 2021-07-29

## 2021-08-06 DIAGNOSIS — Z01.818 PRE-OP TESTING: ICD-10-CM

## 2021-08-13 ENCOUNTER — PATIENT MESSAGE (OUTPATIENT)
Dept: OTOLARYNGOLOGY | Facility: CLINIC | Age: 71
End: 2021-08-13

## 2021-08-21 ENCOUNTER — PATIENT OUTREACH (OUTPATIENT)
Dept: ADMINISTRATIVE | Facility: OTHER | Age: 71
End: 2021-08-21

## 2021-08-23 ENCOUNTER — LAB VISIT (OUTPATIENT)
Dept: PRIMARY CARE CLINIC | Facility: CLINIC | Age: 71
End: 2021-08-23
Payer: MEDICARE

## 2021-08-23 DIAGNOSIS — Z01.818 PRE-OP TESTING: ICD-10-CM

## 2021-08-23 PROCEDURE — U0005 INFEC AGEN DETEC AMPLI PROBE: HCPCS | Performed by: OTOLARYNGOLOGY

## 2021-08-23 PROCEDURE — U0003 INFECTIOUS AGENT DETECTION BY NUCLEIC ACID (DNA OR RNA); SEVERE ACUTE RESPIRATORY SYNDROME CORONAVIRUS 2 (SARS-COV-2) (CORONAVIRUS DISEASE [COVID-19]), AMPLIFIED PROBE TECHNIQUE, MAKING USE OF HIGH THROUGHPUT TECHNOLOGIES AS DESCRIBED BY CMS-2020-01-R: HCPCS | Performed by: OTOLARYNGOLOGY

## 2021-08-24 LAB
SARS-COV-2 RNA RESP QL NAA+PROBE: NOT DETECTED
SARS-COV-2- CYCLE NUMBER: NORMAL

## 2021-08-25 ENCOUNTER — OFFICE VISIT (OUTPATIENT)
Dept: OTOLARYNGOLOGY | Facility: CLINIC | Age: 71
End: 2021-08-25
Payer: MEDICARE

## 2021-08-25 VITALS — HEART RATE: 72 BPM | DIASTOLIC BLOOD PRESSURE: 76 MMHG | SYSTOLIC BLOOD PRESSURE: 134 MMHG

## 2021-08-25 DIAGNOSIS — C32.0 MALIGNANT NEOPLASM OF GLOTTIS: ICD-10-CM

## 2021-08-25 DIAGNOSIS — C32.9 LARYNX NEOPLASM MALIGNANT: Primary | ICD-10-CM

## 2021-08-25 DIAGNOSIS — R13.14 DYSPHAGIA, PHARYNGOESOPHAGEAL: ICD-10-CM

## 2021-08-25 DIAGNOSIS — R49.0 DYSPHONIA: ICD-10-CM

## 2021-08-25 DIAGNOSIS — T66.XXXS ADVERSE EFFECT OF RADIATION, SEQUELA: ICD-10-CM

## 2021-08-25 DIAGNOSIS — Z01.818 PRE-OP TESTING: ICD-10-CM

## 2021-08-25 PROCEDURE — 3072F LOW RISK FOR RETINOPATHY: CPT | Mod: CPTII,S$GLB,, | Performed by: OTOLARYNGOLOGY

## 2021-08-25 PROCEDURE — 1160F PR REVIEW ALL MEDS BY PRESCRIBER/CLIN PHARMACIST DOCUMENTED: ICD-10-PCS | Mod: CPTII,S$GLB,, | Performed by: OTOLARYNGOLOGY

## 2021-08-25 PROCEDURE — 3288F FALL RISK ASSESSMENT DOCD: CPT | Mod: CPTII,S$GLB,, | Performed by: OTOLARYNGOLOGY

## 2021-08-25 PROCEDURE — 1126F PR PAIN SEVERITY QUANTIFIED, NO PAIN PRESENT: ICD-10-PCS | Mod: CPTII,S$GLB,, | Performed by: OTOLARYNGOLOGY

## 2021-08-25 PROCEDURE — 31575 DIAGNOSTIC LARYNGOSCOPY: CPT | Mod: S$GLB,,, | Performed by: OTOLARYNGOLOGY

## 2021-08-25 PROCEDURE — 99999 PR PBB SHADOW E&M-EST. PATIENT-LVL IV: CPT | Mod: PBBFAC,,, | Performed by: OTOLARYNGOLOGY

## 2021-08-25 PROCEDURE — 3288F PR FALLS RISK ASSESSMENT DOCUMENTED: ICD-10-PCS | Mod: CPTII,S$GLB,, | Performed by: OTOLARYNGOLOGY

## 2021-08-25 PROCEDURE — 3078F DIAST BP <80 MM HG: CPT | Mod: CPTII,S$GLB,, | Performed by: OTOLARYNGOLOGY

## 2021-08-25 PROCEDURE — 3075F SYST BP GE 130 - 139MM HG: CPT | Mod: CPTII,S$GLB,, | Performed by: OTOLARYNGOLOGY

## 2021-08-25 PROCEDURE — 3072F PR LOW RISK FOR RETINOPATHY: ICD-10-PCS | Mod: CPTII,S$GLB,, | Performed by: OTOLARYNGOLOGY

## 2021-08-25 PROCEDURE — 3075F PR MOST RECENT SYSTOLIC BLOOD PRESS GE 130-139MM HG: ICD-10-PCS | Mod: CPTII,S$GLB,, | Performed by: OTOLARYNGOLOGY

## 2021-08-25 PROCEDURE — 3044F PR MOST RECENT HEMOGLOBIN A1C LEVEL <7.0%: ICD-10-PCS | Mod: CPTII,S$GLB,, | Performed by: OTOLARYNGOLOGY

## 2021-08-25 PROCEDURE — 1126F AMNT PAIN NOTED NONE PRSNT: CPT | Mod: CPTII,S$GLB,, | Performed by: OTOLARYNGOLOGY

## 2021-08-25 PROCEDURE — 1160F RVW MEDS BY RX/DR IN RCRD: CPT | Mod: CPTII,S$GLB,, | Performed by: OTOLARYNGOLOGY

## 2021-08-25 PROCEDURE — 99213 PR OFFICE/OUTPT VISIT, EST, LEVL III, 20-29 MIN: ICD-10-PCS | Mod: 25,S$GLB,, | Performed by: OTOLARYNGOLOGY

## 2021-08-25 PROCEDURE — 3078F PR MOST RECENT DIASTOLIC BLOOD PRESSURE < 80 MM HG: ICD-10-PCS | Mod: CPTII,S$GLB,, | Performed by: OTOLARYNGOLOGY

## 2021-08-25 PROCEDURE — 1101F PR PT FALLS ASSESS DOC 0-1 FALLS W/OUT INJ PAST YR: ICD-10-PCS | Mod: CPTII,S$GLB,, | Performed by: OTOLARYNGOLOGY

## 2021-08-25 PROCEDURE — 1159F MED LIST DOCD IN RCRD: CPT | Mod: CPTII,S$GLB,, | Performed by: OTOLARYNGOLOGY

## 2021-08-25 PROCEDURE — 99999 PR PBB SHADOW E&M-EST. PATIENT-LVL IV: ICD-10-PCS | Mod: PBBFAC,,, | Performed by: OTOLARYNGOLOGY

## 2021-08-25 PROCEDURE — 31575 PR LARYNGOSCOPY, FLEXIBLE; DIAGNOSTIC: ICD-10-PCS | Mod: S$GLB,,, | Performed by: OTOLARYNGOLOGY

## 2021-08-25 PROCEDURE — 1101F PT FALLS ASSESS-DOCD LE1/YR: CPT | Mod: CPTII,S$GLB,, | Performed by: OTOLARYNGOLOGY

## 2021-08-25 PROCEDURE — 3044F HG A1C LEVEL LT 7.0%: CPT | Mod: CPTII,S$GLB,, | Performed by: OTOLARYNGOLOGY

## 2021-08-25 PROCEDURE — 99213 OFFICE O/P EST LOW 20 MIN: CPT | Mod: 25,S$GLB,, | Performed by: OTOLARYNGOLOGY

## 2021-08-25 PROCEDURE — 1159F PR MEDICATION LIST DOCUMENTED IN MEDICAL RECORD: ICD-10-PCS | Mod: CPTII,S$GLB,, | Performed by: OTOLARYNGOLOGY

## 2021-09-21 ENCOUNTER — PATIENT MESSAGE (OUTPATIENT)
Dept: OTOLARYNGOLOGY | Facility: CLINIC | Age: 71
End: 2021-09-21

## 2021-09-21 ENCOUNTER — PATIENT MESSAGE (OUTPATIENT)
Dept: CARDIOLOGY | Facility: CLINIC | Age: 71
End: 2021-09-21

## 2021-09-22 DIAGNOSIS — G47.30 SLEEP-DISORDERED BREATHING: Primary | ICD-10-CM

## 2021-09-22 DIAGNOSIS — R06.83 SNORING: ICD-10-CM

## 2021-10-03 ENCOUNTER — PATIENT MESSAGE (OUTPATIENT)
Dept: ENDOCRINOLOGY | Facility: CLINIC | Age: 71
End: 2021-10-03

## 2021-10-04 ENCOUNTER — PATIENT MESSAGE (OUTPATIENT)
Dept: ENDOCRINOLOGY | Facility: CLINIC | Age: 71
End: 2021-10-04

## 2021-10-04 ENCOUNTER — PATIENT OUTREACH (OUTPATIENT)
Dept: ADMINISTRATIVE | Facility: OTHER | Age: 71
End: 2021-10-04

## 2021-10-04 ENCOUNTER — LAB VISIT (OUTPATIENT)
Dept: PRIMARY CARE CLINIC | Facility: CLINIC | Age: 71
End: 2021-10-04
Payer: MEDICARE

## 2021-10-04 DIAGNOSIS — Z79.4 TYPE 2 DIABETES MELLITUS WITHOUT COMPLICATION, WITH LONG-TERM CURRENT USE OF INSULIN: ICD-10-CM

## 2021-10-04 DIAGNOSIS — E11.9 TYPE 2 DIABETES MELLITUS WITHOUT COMPLICATION, WITH LONG-TERM CURRENT USE OF INSULIN: ICD-10-CM

## 2021-10-04 DIAGNOSIS — Z01.818 PRE-OP TESTING: ICD-10-CM

## 2021-10-04 PROCEDURE — U0003 INFECTIOUS AGENT DETECTION BY NUCLEIC ACID (DNA OR RNA); SEVERE ACUTE RESPIRATORY SYNDROME CORONAVIRUS 2 (SARS-COV-2) (CORONAVIRUS DISEASE [COVID-19]), AMPLIFIED PROBE TECHNIQUE, MAKING USE OF HIGH THROUGHPUT TECHNOLOGIES AS DESCRIBED BY CMS-2020-01-R: HCPCS | Performed by: OTOLARYNGOLOGY

## 2021-10-04 PROCEDURE — U0005 INFEC AGEN DETEC AMPLI PROBE: HCPCS | Performed by: OTOLARYNGOLOGY

## 2021-10-04 RX ORDER — PEN NEEDLE, DIABETIC 30 GX3/16"
NEEDLE, DISPOSABLE MISCELLANEOUS
Qty: 30 EACH | Refills: 1 | Status: ON HOLD | OUTPATIENT
Start: 2021-10-04 | End: 2022-06-10 | Stop reason: HOSPADM

## 2021-10-05 ENCOUNTER — LAB VISIT (OUTPATIENT)
Dept: LAB | Facility: HOSPITAL | Age: 71
End: 2021-10-05
Attending: PHYSICIAN ASSISTANT
Payer: MEDICARE

## 2021-10-05 DIAGNOSIS — Z79.4 TYPE 2 DIABETES MELLITUS WITH HYPERGLYCEMIA, WITH LONG-TERM CURRENT USE OF INSULIN: ICD-10-CM

## 2021-10-05 DIAGNOSIS — E11.65 TYPE 2 DIABETES MELLITUS WITH HYPERGLYCEMIA, WITH LONG-TERM CURRENT USE OF INSULIN: ICD-10-CM

## 2021-10-05 LAB
ALBUMIN SERPL BCP-MCNC: 4.1 G/DL (ref 3.5–5.2)
ANION GAP SERPL CALC-SCNC: 9 MMOL/L (ref 8–16)
BUN SERPL-MCNC: 10 MG/DL (ref 8–23)
CALCIUM SERPL-MCNC: 10.2 MG/DL (ref 8.7–10.5)
CHLORIDE SERPL-SCNC: 106 MMOL/L (ref 95–110)
CO2 SERPL-SCNC: 24 MMOL/L (ref 23–29)
CREAT SERPL-MCNC: 1.2 MG/DL (ref 0.5–1.4)
EST. GFR  (AFRICAN AMERICAN): >60 ML/MIN/1.73 M^2
EST. GFR  (NON AFRICAN AMERICAN): >60 ML/MIN/1.73 M^2
ESTIMATED AVG GLUCOSE: 111 MG/DL (ref 68–131)
GLUCOSE SERPL-MCNC: 125 MG/DL (ref 70–110)
HBA1C MFR BLD: 5.5 % (ref 4–5.6)
PHOSPHATE SERPL-MCNC: 2.9 MG/DL (ref 2.7–4.5)
POTASSIUM SERPL-SCNC: 4.1 MMOL/L (ref 3.5–5.1)
SARS-COV-2 RNA RESP QL NAA+PROBE: NOT DETECTED
SARS-COV-2- CYCLE NUMBER: NORMAL
SODIUM SERPL-SCNC: 139 MMOL/L (ref 136–145)
T4 FREE SERPL-MCNC: 0.87 NG/DL (ref 0.71–1.51)
TSH SERPL DL<=0.005 MIU/L-ACNC: 3.47 UIU/ML (ref 0.4–4)

## 2021-10-05 PROCEDURE — 84439 ASSAY OF FREE THYROXINE: CPT | Performed by: PHYSICIAN ASSISTANT

## 2021-10-05 PROCEDURE — 83036 HEMOGLOBIN GLYCOSYLATED A1C: CPT | Performed by: PHYSICIAN ASSISTANT

## 2021-10-05 PROCEDURE — 84443 ASSAY THYROID STIM HORMONE: CPT | Performed by: PHYSICIAN ASSISTANT

## 2021-10-05 PROCEDURE — 36415 COLL VENOUS BLD VENIPUNCTURE: CPT | Mod: PO | Performed by: PHYSICIAN ASSISTANT

## 2021-10-05 PROCEDURE — 80069 RENAL FUNCTION PANEL: CPT | Performed by: PHYSICIAN ASSISTANT

## 2021-10-06 ENCOUNTER — TELEPHONE (OUTPATIENT)
Dept: SPEECH THERAPY | Facility: HOSPITAL | Age: 71
End: 2021-10-06

## 2021-10-06 ENCOUNTER — OFFICE VISIT (OUTPATIENT)
Dept: OTOLARYNGOLOGY | Facility: CLINIC | Age: 71
End: 2021-10-06
Payer: MEDICARE

## 2021-10-06 ENCOUNTER — CLINICAL SUPPORT (OUTPATIENT)
Dept: SPEECH THERAPY | Facility: HOSPITAL | Age: 71
End: 2021-10-06

## 2021-10-06 VITALS — DIASTOLIC BLOOD PRESSURE: 74 MMHG | SYSTOLIC BLOOD PRESSURE: 136 MMHG | HEART RATE: 65 BPM

## 2021-10-06 DIAGNOSIS — R49.0 DYSPHONIA: ICD-10-CM

## 2021-10-06 DIAGNOSIS — C32.0 MALIGNANT NEOPLASM OF GLOTTIS: ICD-10-CM

## 2021-10-06 DIAGNOSIS — R68.89 OTHER GENERAL SYMPTOMS AND SIGNS: ICD-10-CM

## 2021-10-06 DIAGNOSIS — G47.30 SLEEP-DISORDERED BREATHING: ICD-10-CM

## 2021-10-06 DIAGNOSIS — C32.9 LARYNX NEOPLASM MALIGNANT: Primary | ICD-10-CM

## 2021-10-06 DIAGNOSIS — R13.14 DYSPHAGIA, PHARYNGOESOPHAGEAL: ICD-10-CM

## 2021-10-06 DIAGNOSIS — Z12.11 COLON CANCER SCREENING: ICD-10-CM

## 2021-10-06 DIAGNOSIS — T66.XXXS RADIATION INJURY, SEQUELA: ICD-10-CM

## 2021-10-06 PROCEDURE — 99213 PR OFFICE/OUTPT VISIT, EST, LEVL III, 20-29 MIN: ICD-10-PCS | Mod: 25,S$GLB,, | Performed by: OTOLARYNGOLOGY

## 2021-10-06 PROCEDURE — 3044F HG A1C LEVEL LT 7.0%: CPT | Mod: CPTII,S$GLB,, | Performed by: OTOLARYNGOLOGY

## 2021-10-06 PROCEDURE — 3072F PR LOW RISK FOR RETINOPATHY: ICD-10-PCS | Mod: CPTII,S$GLB,, | Performed by: OTOLARYNGOLOGY

## 2021-10-06 PROCEDURE — 99999 PR PBB SHADOW E&M-EST. PATIENT-LVL IV: CPT | Mod: PBBFAC,,, | Performed by: OTOLARYNGOLOGY

## 2021-10-06 PROCEDURE — 3072F LOW RISK FOR RETINOPATHY: CPT | Mod: CPTII,S$GLB,, | Performed by: OTOLARYNGOLOGY

## 2021-10-06 PROCEDURE — 3060F PR POS MICROALBUMINURIA RESULT DOCUMENTED/REVIEW: ICD-10-PCS | Mod: CPTII,S$GLB,, | Performed by: OTOLARYNGOLOGY

## 2021-10-06 PROCEDURE — 1101F PT FALLS ASSESS-DOCD LE1/YR: CPT | Mod: CPTII,S$GLB,, | Performed by: OTOLARYNGOLOGY

## 2021-10-06 PROCEDURE — 3075F SYST BP GE 130 - 139MM HG: CPT | Mod: CPTII,S$GLB,, | Performed by: OTOLARYNGOLOGY

## 2021-10-06 PROCEDURE — 3288F FALL RISK ASSESSMENT DOCD: CPT | Mod: CPTII,S$GLB,, | Performed by: OTOLARYNGOLOGY

## 2021-10-06 PROCEDURE — 3078F DIAST BP <80 MM HG: CPT | Mod: CPTII,S$GLB,, | Performed by: OTOLARYNGOLOGY

## 2021-10-06 PROCEDURE — 4010F ACE/ARB THERAPY RXD/TAKEN: CPT | Mod: CPTII,S$GLB,, | Performed by: OTOLARYNGOLOGY

## 2021-10-06 PROCEDURE — 1126F AMNT PAIN NOTED NONE PRSNT: CPT | Mod: CPTII,S$GLB,, | Performed by: OTOLARYNGOLOGY

## 2021-10-06 PROCEDURE — 99999 PR PBB SHADOW E&M-EST. PATIENT-LVL IV: ICD-10-PCS | Mod: PBBFAC,,, | Performed by: OTOLARYNGOLOGY

## 2021-10-06 PROCEDURE — 1159F PR MEDICATION LIST DOCUMENTED IN MEDICAL RECORD: ICD-10-PCS | Mod: CPTII,S$GLB,, | Performed by: OTOLARYNGOLOGY

## 2021-10-06 PROCEDURE — 31579 LARYNGOSCOPY TELESCOPIC: CPT | Mod: S$GLB,,, | Performed by: OTOLARYNGOLOGY

## 2021-10-06 PROCEDURE — 3288F PR FALLS RISK ASSESSMENT DOCUMENTED: ICD-10-PCS | Mod: CPTII,S$GLB,, | Performed by: OTOLARYNGOLOGY

## 2021-10-06 PROCEDURE — 3066F NEPHROPATHY DOC TX: CPT | Mod: CPTII,S$GLB,, | Performed by: OTOLARYNGOLOGY

## 2021-10-06 PROCEDURE — 1159F MED LIST DOCD IN RCRD: CPT | Mod: CPTII,S$GLB,, | Performed by: OTOLARYNGOLOGY

## 2021-10-06 PROCEDURE — 4010F PR ACE/ARB THEARPY RXD/TAKEN: ICD-10-PCS | Mod: CPTII,S$GLB,, | Performed by: OTOLARYNGOLOGY

## 2021-10-06 PROCEDURE — 1126F PR PAIN SEVERITY QUANTIFIED, NO PAIN PRESENT: ICD-10-PCS | Mod: CPTII,S$GLB,, | Performed by: OTOLARYNGOLOGY

## 2021-10-06 PROCEDURE — 31579 PR LARYNGOSCOPY, FLEX/RIGID TELESCOPIC, W/STROBOSCOPY: ICD-10-PCS | Mod: S$GLB,,, | Performed by: OTOLARYNGOLOGY

## 2021-10-06 PROCEDURE — 3078F PR MOST RECENT DIASTOLIC BLOOD PRESSURE < 80 MM HG: ICD-10-PCS | Mod: CPTII,S$GLB,, | Performed by: OTOLARYNGOLOGY

## 2021-10-06 PROCEDURE — 3075F PR MOST RECENT SYSTOLIC BLOOD PRESS GE 130-139MM HG: ICD-10-PCS | Mod: CPTII,S$GLB,, | Performed by: OTOLARYNGOLOGY

## 2021-10-06 PROCEDURE — 99213 OFFICE O/P EST LOW 20 MIN: CPT | Mod: 25,S$GLB,, | Performed by: OTOLARYNGOLOGY

## 2021-10-06 PROCEDURE — 3044F PR MOST RECENT HEMOGLOBIN A1C LEVEL <7.0%: ICD-10-PCS | Mod: CPTII,S$GLB,, | Performed by: OTOLARYNGOLOGY

## 2021-10-06 PROCEDURE — 1101F PR PT FALLS ASSESS DOC 0-1 FALLS W/OUT INJ PAST YR: ICD-10-PCS | Mod: CPTII,S$GLB,, | Performed by: OTOLARYNGOLOGY

## 2021-10-06 PROCEDURE — 3066F PR DOCUMENTATION OF TREATMENT FOR NEPHROPATHY: ICD-10-PCS | Mod: CPTII,S$GLB,, | Performed by: OTOLARYNGOLOGY

## 2021-10-06 PROCEDURE — 3060F POS MICROALBUMINURIA REV: CPT | Mod: CPTII,S$GLB,, | Performed by: OTOLARYNGOLOGY

## 2021-10-07 ENCOUNTER — PATIENT MESSAGE (OUTPATIENT)
Dept: ADMINISTRATIVE | Facility: HOSPITAL | Age: 71
End: 2021-10-07

## 2021-10-08 ENCOUNTER — OFFICE VISIT (OUTPATIENT)
Dept: ENDOCRINOLOGY | Facility: CLINIC | Age: 71
End: 2021-10-08
Payer: MEDICARE

## 2021-10-08 VITALS
HEIGHT: 66 IN | WEIGHT: 149.13 LBS | SYSTOLIC BLOOD PRESSURE: 104 MMHG | HEART RATE: 72 BPM | TEMPERATURE: 98 F | DIASTOLIC BLOOD PRESSURE: 72 MMHG | OXYGEN SATURATION: 98 % | BODY MASS INDEX: 23.97 KG/M2

## 2021-10-08 DIAGNOSIS — E11.9 TYPE 2 DIABETES MELLITUS WITHOUT COMPLICATION, WITH LONG-TERM CURRENT USE OF INSULIN: Primary | ICD-10-CM

## 2021-10-08 DIAGNOSIS — E55.9 HYPOVITAMINOSIS D: ICD-10-CM

## 2021-10-08 DIAGNOSIS — E78.5 HYPERLIPIDEMIA, UNSPECIFIED HYPERLIPIDEMIA TYPE: ICD-10-CM

## 2021-10-08 DIAGNOSIS — Z79.4 TYPE 2 DIABETES MELLITUS WITHOUT COMPLICATION, WITH LONG-TERM CURRENT USE OF INSULIN: Primary | ICD-10-CM

## 2021-10-08 DIAGNOSIS — I10 BENIGN HYPERTENSION: ICD-10-CM

## 2021-10-08 DIAGNOSIS — B35.1 ONYCHOMYCOSIS: ICD-10-CM

## 2021-10-08 DIAGNOSIS — Z78.9 STATIN INTOLERANCE: ICD-10-CM

## 2021-10-08 DIAGNOSIS — N52.9 ERECTILE DYSFUNCTION, UNSPECIFIED ERECTILE DYSFUNCTION TYPE: ICD-10-CM

## 2021-10-08 PROCEDURE — 3078F DIAST BP <80 MM HG: CPT | Mod: CPTII,S$GLB,, | Performed by: PHYSICIAN ASSISTANT

## 2021-10-08 PROCEDURE — 3072F LOW RISK FOR RETINOPATHY: CPT | Mod: CPTII,S$GLB,, | Performed by: PHYSICIAN ASSISTANT

## 2021-10-08 PROCEDURE — 3288F PR FALLS RISK ASSESSMENT DOCUMENTED: ICD-10-PCS | Mod: CPTII,S$GLB,, | Performed by: PHYSICIAN ASSISTANT

## 2021-10-08 PROCEDURE — 99999 PR PBB SHADOW E&M-EST. PATIENT-LVL IV: ICD-10-PCS | Mod: PBBFAC,,, | Performed by: PHYSICIAN ASSISTANT

## 2021-10-08 PROCEDURE — 99214 OFFICE O/P EST MOD 30 MIN: CPT | Mod: S$GLB,,, | Performed by: PHYSICIAN ASSISTANT

## 2021-10-08 PROCEDURE — 3008F PR BODY MASS INDEX (BMI) DOCUMENTED: ICD-10-PCS | Mod: CPTII,S$GLB,, | Performed by: PHYSICIAN ASSISTANT

## 2021-10-08 PROCEDURE — 1159F PR MEDICATION LIST DOCUMENTED IN MEDICAL RECORD: ICD-10-PCS | Mod: CPTII,S$GLB,, | Performed by: PHYSICIAN ASSISTANT

## 2021-10-08 PROCEDURE — 1101F PT FALLS ASSESS-DOCD LE1/YR: CPT | Mod: CPTII,S$GLB,, | Performed by: PHYSICIAN ASSISTANT

## 2021-10-08 PROCEDURE — 3078F PR MOST RECENT DIASTOLIC BLOOD PRESSURE < 80 MM HG: ICD-10-PCS | Mod: CPTII,S$GLB,, | Performed by: PHYSICIAN ASSISTANT

## 2021-10-08 PROCEDURE — 3288F FALL RISK ASSESSMENT DOCD: CPT | Mod: CPTII,S$GLB,, | Performed by: PHYSICIAN ASSISTANT

## 2021-10-08 PROCEDURE — 3066F NEPHROPATHY DOC TX: CPT | Mod: CPTII,S$GLB,, | Performed by: PHYSICIAN ASSISTANT

## 2021-10-08 PROCEDURE — 3060F PR POS MICROALBUMINURIA RESULT DOCUMENTED/REVIEW: ICD-10-PCS | Mod: CPTII,S$GLB,, | Performed by: PHYSICIAN ASSISTANT

## 2021-10-08 PROCEDURE — 3060F POS MICROALBUMINURIA REV: CPT | Mod: CPTII,S$GLB,, | Performed by: PHYSICIAN ASSISTANT

## 2021-10-08 PROCEDURE — 3044F PR MOST RECENT HEMOGLOBIN A1C LEVEL <7.0%: ICD-10-PCS | Mod: CPTII,S$GLB,, | Performed by: PHYSICIAN ASSISTANT

## 2021-10-08 PROCEDURE — 3072F PR LOW RISK FOR RETINOPATHY: ICD-10-PCS | Mod: CPTII,S$GLB,, | Performed by: PHYSICIAN ASSISTANT

## 2021-10-08 PROCEDURE — 1126F PR PAIN SEVERITY QUANTIFIED, NO PAIN PRESENT: ICD-10-PCS | Mod: CPTII,S$GLB,, | Performed by: PHYSICIAN ASSISTANT

## 2021-10-08 PROCEDURE — 1101F PR PT FALLS ASSESS DOC 0-1 FALLS W/OUT INJ PAST YR: ICD-10-PCS | Mod: CPTII,S$GLB,, | Performed by: PHYSICIAN ASSISTANT

## 2021-10-08 PROCEDURE — 3008F BODY MASS INDEX DOCD: CPT | Mod: CPTII,S$GLB,, | Performed by: PHYSICIAN ASSISTANT

## 2021-10-08 PROCEDURE — 1160F RVW MEDS BY RX/DR IN RCRD: CPT | Mod: CPTII,S$GLB,, | Performed by: PHYSICIAN ASSISTANT

## 2021-10-08 PROCEDURE — 3044F HG A1C LEVEL LT 7.0%: CPT | Mod: CPTII,S$GLB,, | Performed by: PHYSICIAN ASSISTANT

## 2021-10-08 PROCEDURE — 3074F SYST BP LT 130 MM HG: CPT | Mod: CPTII,S$GLB,, | Performed by: PHYSICIAN ASSISTANT

## 2021-10-08 PROCEDURE — 1126F AMNT PAIN NOTED NONE PRSNT: CPT | Mod: CPTII,S$GLB,, | Performed by: PHYSICIAN ASSISTANT

## 2021-10-08 PROCEDURE — 4010F PR ACE/ARB THEARPY RXD/TAKEN: ICD-10-PCS | Mod: CPTII,S$GLB,, | Performed by: PHYSICIAN ASSISTANT

## 2021-10-08 PROCEDURE — 1159F MED LIST DOCD IN RCRD: CPT | Mod: CPTII,S$GLB,, | Performed by: PHYSICIAN ASSISTANT

## 2021-10-08 PROCEDURE — 99214 PR OFFICE/OUTPT VISIT, EST, LEVL IV, 30-39 MIN: ICD-10-PCS | Mod: S$GLB,,, | Performed by: PHYSICIAN ASSISTANT

## 2021-10-08 PROCEDURE — 3074F PR MOST RECENT SYSTOLIC BLOOD PRESSURE < 130 MM HG: ICD-10-PCS | Mod: CPTII,S$GLB,, | Performed by: PHYSICIAN ASSISTANT

## 2021-10-08 PROCEDURE — 1160F PR REVIEW ALL MEDS BY PRESCRIBER/CLIN PHARMACIST DOCUMENTED: ICD-10-PCS | Mod: CPTII,S$GLB,, | Performed by: PHYSICIAN ASSISTANT

## 2021-10-08 PROCEDURE — 3066F PR DOCUMENTATION OF TREATMENT FOR NEPHROPATHY: ICD-10-PCS | Mod: CPTII,S$GLB,, | Performed by: PHYSICIAN ASSISTANT

## 2021-10-08 PROCEDURE — 4010F ACE/ARB THERAPY RXD/TAKEN: CPT | Mod: CPTII,S$GLB,, | Performed by: PHYSICIAN ASSISTANT

## 2021-10-08 PROCEDURE — 99999 PR PBB SHADOW E&M-EST. PATIENT-LVL IV: CPT | Mod: PBBFAC,,, | Performed by: PHYSICIAN ASSISTANT

## 2021-10-08 RX ORDER — CICLOPIROX OLAMINE 7.7 MG/G
CREAM TOPICAL 2 TIMES DAILY
Qty: 1 TUBE | Refills: 2 | Status: ON HOLD | OUTPATIENT
Start: 2021-10-08 | End: 2022-05-15

## 2021-10-11 ENCOUNTER — IMMUNIZATION (OUTPATIENT)
Dept: PRIMARY CARE CLINIC | Facility: CLINIC | Age: 71
End: 2021-10-11
Payer: MEDICARE

## 2021-10-11 DIAGNOSIS — Z23 NEED FOR VACCINATION: Primary | ICD-10-CM

## 2021-10-11 PROCEDURE — 91300 COVID-19, MRNA, LNP-S, PF, 30 MCG/0.3 ML DOSE VACCINE: ICD-10-PCS | Mod: S$GLB,,, | Performed by: FAMILY MEDICINE

## 2021-10-11 PROCEDURE — 91300 COVID-19, MRNA, LNP-S, PF, 30 MCG/0.3 ML DOSE VACCINE: CPT | Mod: S$GLB,,, | Performed by: FAMILY MEDICINE

## 2021-10-11 PROCEDURE — 0003A COVID-19, MRNA, LNP-S, PF, 30 MCG/0.3 ML DOSE VACCINE: CPT | Mod: S$GLB,,, | Performed by: FAMILY MEDICINE

## 2021-10-11 PROCEDURE — 0003A COVID-19, MRNA, LNP-S, PF, 30 MCG/0.3 ML DOSE VACCINE: ICD-10-PCS | Mod: S$GLB,,, | Performed by: FAMILY MEDICINE

## 2021-10-26 ENCOUNTER — HOSPITAL ENCOUNTER (OUTPATIENT)
Dept: RADIOLOGY | Facility: HOSPITAL | Age: 71
Discharge: HOME | End: 2021-10-26
Attending: OTOLARYNGOLOGY
Payer: MEDICARE

## 2021-10-26 ENCOUNTER — CLINICAL SUPPORT (OUTPATIENT)
Dept: REHABILITATION | Facility: HOSPITAL | Age: 71
End: 2021-10-26
Attending: OTOLARYNGOLOGY
Payer: MEDICARE

## 2021-10-26 DIAGNOSIS — R13.14 DYSPHAGIA, PHARYNGOESOPHAGEAL PHASE: ICD-10-CM

## 2021-10-26 DIAGNOSIS — T66.XXXS RADIATION INJURY, SEQUELA: ICD-10-CM

## 2021-10-26 DIAGNOSIS — R68.89 OTHER GENERAL SYMPTOMS AND SIGNS: ICD-10-CM

## 2021-10-26 PROCEDURE — 74230 FL MODIFIED BARIUM SWALLOW SPEECH STUDY: ICD-10-PCS | Mod: 26,,, | Performed by: RADIOLOGY

## 2021-10-26 PROCEDURE — 74230 X-RAY XM SWLNG FUNCJ C+: CPT | Mod: 26,,, | Performed by: RADIOLOGY

## 2021-10-26 PROCEDURE — 92611 MOTION FLUOROSCOPY/SWALLOW: CPT | Mod: PN

## 2021-10-26 PROCEDURE — 92610 EVALUATE SWALLOWING FUNCTION: CPT | Mod: 59,PN

## 2021-10-26 PROCEDURE — 74230 X-RAY XM SWLNG FUNCJ C+: CPT | Mod: TC

## 2021-11-02 DIAGNOSIS — R13.14 DYSPHAGIA, PHARYNGOESOPHAGEAL: Primary | ICD-10-CM

## 2021-11-02 DIAGNOSIS — T66.XXXS ADVERSE EFFECT OF RADIATION, SEQUELA: ICD-10-CM

## 2021-11-02 DIAGNOSIS — C32.0 MALIGNANT NEOPLASM OF GLOTTIS: ICD-10-CM

## 2021-11-08 ENCOUNTER — CLINICAL SUPPORT (OUTPATIENT)
Dept: SPEECH THERAPY | Facility: HOSPITAL | Age: 71
End: 2021-11-08
Payer: MEDICARE

## 2021-11-08 ENCOUNTER — OFFICE VISIT (OUTPATIENT)
Dept: OTOLARYNGOLOGY | Facility: CLINIC | Age: 71
End: 2021-11-08
Payer: MEDICARE

## 2021-11-08 VITALS — SYSTOLIC BLOOD PRESSURE: 149 MMHG | DIASTOLIC BLOOD PRESSURE: 75 MMHG | HEART RATE: 66 BPM

## 2021-11-08 DIAGNOSIS — R13.14 DYSPHAGIA, PHARYNGOESOPHAGEAL: ICD-10-CM

## 2021-11-08 DIAGNOSIS — T66.XXXS RADIATION INJURY, SEQUELA: ICD-10-CM

## 2021-11-08 DIAGNOSIS — T66.XXXS ADVERSE EFFECT OF RADIATION, SEQUELA: ICD-10-CM

## 2021-11-08 DIAGNOSIS — R49.0 DYSPHONIA: ICD-10-CM

## 2021-11-08 DIAGNOSIS — C32.0 MALIGNANT NEOPLASM OF GLOTTIS: ICD-10-CM

## 2021-11-08 DIAGNOSIS — R49.0 DYSPHONIA: Primary | ICD-10-CM

## 2021-11-08 DIAGNOSIS — C32.9 LARYNX NEOPLASM MALIGNANT: ICD-10-CM

## 2021-11-08 PROCEDURE — 1160F RVW MEDS BY RX/DR IN RCRD: CPT | Mod: CPTII,S$GLB,, | Performed by: OTOLARYNGOLOGY

## 2021-11-08 PROCEDURE — 31579 PR LARYNGOSCOPY, FLEX/RIGID TELESCOPIC, W/STROBOSCOPY: ICD-10-PCS | Mod: S$GLB,,, | Performed by: OTOLARYNGOLOGY

## 2021-11-08 PROCEDURE — 99213 PR OFFICE/OUTPT VISIT, EST, LEVL III, 20-29 MIN: ICD-10-PCS | Mod: 25,S$GLB,, | Performed by: OTOLARYNGOLOGY

## 2021-11-08 PROCEDURE — 92507 TX SP LANG VOICE COMM INDIV: CPT | Mod: GN

## 2021-11-08 PROCEDURE — 3072F LOW RISK FOR RETINOPATHY: CPT | Mod: CPTII,S$GLB,, | Performed by: OTOLARYNGOLOGY

## 2021-11-08 PROCEDURE — 1159F PR MEDICATION LIST DOCUMENTED IN MEDICAL RECORD: ICD-10-PCS | Mod: CPTII,S$GLB,, | Performed by: OTOLARYNGOLOGY

## 2021-11-08 PROCEDURE — 99999 PR PBB SHADOW E&M-EST. PATIENT-LVL III: ICD-10-PCS | Mod: PBBFAC,,, | Performed by: OTOLARYNGOLOGY

## 2021-11-08 PROCEDURE — 1126F PR PAIN SEVERITY QUANTIFIED, NO PAIN PRESENT: ICD-10-PCS | Mod: CPTII,S$GLB,, | Performed by: OTOLARYNGOLOGY

## 2021-11-08 PROCEDURE — 3066F PR DOCUMENTATION OF TREATMENT FOR NEPHROPATHY: ICD-10-PCS | Mod: CPTII,S$GLB,, | Performed by: OTOLARYNGOLOGY

## 2021-11-08 PROCEDURE — 3288F FALL RISK ASSESSMENT DOCD: CPT | Mod: CPTII,S$GLB,, | Performed by: OTOLARYNGOLOGY

## 2021-11-08 PROCEDURE — 3060F POS MICROALBUMINURIA REV: CPT | Mod: CPTII,S$GLB,, | Performed by: OTOLARYNGOLOGY

## 2021-11-08 PROCEDURE — 4010F PR ACE/ARB THEARPY RXD/TAKEN: ICD-10-PCS | Mod: CPTII,S$GLB,, | Performed by: OTOLARYNGOLOGY

## 2021-11-08 PROCEDURE — 3044F PR MOST RECENT HEMOGLOBIN A1C LEVEL <7.0%: ICD-10-PCS | Mod: CPTII,S$GLB,, | Performed by: OTOLARYNGOLOGY

## 2021-11-08 PROCEDURE — 3066F NEPHROPATHY DOC TX: CPT | Mod: CPTII,S$GLB,, | Performed by: OTOLARYNGOLOGY

## 2021-11-08 PROCEDURE — 3288F PR FALLS RISK ASSESSMENT DOCUMENTED: ICD-10-PCS | Mod: CPTII,S$GLB,, | Performed by: OTOLARYNGOLOGY

## 2021-11-08 PROCEDURE — 4010F ACE/ARB THERAPY RXD/TAKEN: CPT | Mod: CPTII,S$GLB,, | Performed by: OTOLARYNGOLOGY

## 2021-11-08 PROCEDURE — 1160F PR REVIEW ALL MEDS BY PRESCRIBER/CLIN PHARMACIST DOCUMENTED: ICD-10-PCS | Mod: CPTII,S$GLB,, | Performed by: OTOLARYNGOLOGY

## 2021-11-08 PROCEDURE — 31579 LARYNGOSCOPY TELESCOPIC: CPT | Mod: S$GLB,,, | Performed by: OTOLARYNGOLOGY

## 2021-11-08 PROCEDURE — 3077F PR MOST RECENT SYSTOLIC BLOOD PRESSURE >= 140 MM HG: ICD-10-PCS | Mod: CPTII,S$GLB,, | Performed by: OTOLARYNGOLOGY

## 2021-11-08 PROCEDURE — 3044F HG A1C LEVEL LT 7.0%: CPT | Mod: CPTII,S$GLB,, | Performed by: OTOLARYNGOLOGY

## 2021-11-08 PROCEDURE — 1126F AMNT PAIN NOTED NONE PRSNT: CPT | Mod: CPTII,S$GLB,, | Performed by: OTOLARYNGOLOGY

## 2021-11-08 PROCEDURE — 3077F SYST BP >= 140 MM HG: CPT | Mod: CPTII,S$GLB,, | Performed by: OTOLARYNGOLOGY

## 2021-11-08 PROCEDURE — 99213 OFFICE O/P EST LOW 20 MIN: CPT | Mod: 25,S$GLB,, | Performed by: OTOLARYNGOLOGY

## 2021-11-08 PROCEDURE — 1159F MED LIST DOCD IN RCRD: CPT | Mod: CPTII,S$GLB,, | Performed by: OTOLARYNGOLOGY

## 2021-11-08 PROCEDURE — 3072F PR LOW RISK FOR RETINOPATHY: ICD-10-PCS | Mod: CPTII,S$GLB,, | Performed by: OTOLARYNGOLOGY

## 2021-11-08 PROCEDURE — 3078F DIAST BP <80 MM HG: CPT | Mod: CPTII,S$GLB,, | Performed by: OTOLARYNGOLOGY

## 2021-11-08 PROCEDURE — 99999 PR PBB SHADOW E&M-EST. PATIENT-LVL III: CPT | Mod: PBBFAC,,, | Performed by: OTOLARYNGOLOGY

## 2021-11-08 PROCEDURE — 1101F PT FALLS ASSESS-DOCD LE1/YR: CPT | Mod: CPTII,S$GLB,, | Performed by: OTOLARYNGOLOGY

## 2021-11-08 PROCEDURE — 3078F PR MOST RECENT DIASTOLIC BLOOD PRESSURE < 80 MM HG: ICD-10-PCS | Mod: CPTII,S$GLB,, | Performed by: OTOLARYNGOLOGY

## 2021-11-08 PROCEDURE — 1101F PR PT FALLS ASSESS DOC 0-1 FALLS W/OUT INJ PAST YR: ICD-10-PCS | Mod: CPTII,S$GLB,, | Performed by: OTOLARYNGOLOGY

## 2021-11-08 PROCEDURE — 3060F PR POS MICROALBUMINURIA RESULT DOCUMENTED/REVIEW: ICD-10-PCS | Mod: CPTII,S$GLB,, | Performed by: OTOLARYNGOLOGY

## 2021-11-15 ENCOUNTER — PATIENT MESSAGE (OUTPATIENT)
Dept: OTOLARYNGOLOGY | Facility: CLINIC | Age: 71
End: 2021-11-15

## 2021-11-16 ENCOUNTER — PATIENT MESSAGE (OUTPATIENT)
Dept: RADIATION ONCOLOGY | Facility: CLINIC | Age: 71
End: 2021-11-16

## 2021-11-17 ENCOUNTER — TELEPHONE (OUTPATIENT)
Dept: RADIATION ONCOLOGY | Facility: CLINIC | Age: 71
End: 2021-11-17

## 2021-11-17 DIAGNOSIS — Z03.818 ENCNTR FOR OBS FOR SUSP EXPSR TO OTH BIOLG AGENTS RULED OUT: Primary | ICD-10-CM

## 2021-11-24 ENCOUNTER — PATIENT MESSAGE (OUTPATIENT)
Dept: OTOLARYNGOLOGY | Facility: CLINIC | Age: 71
End: 2021-11-24

## 2021-11-24 ENCOUNTER — HOSPITAL ENCOUNTER (OUTPATIENT)
Dept: RADIOLOGY | Facility: HOSPITAL | Age: 71
Discharge: HOME | End: 2021-11-24
Attending: RADIOLOGY
Payer: MEDICARE

## 2021-11-24 DIAGNOSIS — C76.0 MALIGNANT NEOPLASM OF HEAD, FACE AND NECK: ICD-10-CM

## 2021-11-24 LAB
CREAT SERPL-MCNC: 1.1 MG/DL (ref 0.5–1.4)
SAMPLE: NORMAL

## 2021-11-24 PROCEDURE — 82565 ASSAY OF CREATININE: CPT | Mod: PO

## 2021-11-24 PROCEDURE — 71260 CT THORAX DX C+: CPT | Mod: TC,PO

## 2021-11-24 PROCEDURE — 25500020 PHARM REV CODE 255: Mod: PO | Performed by: RADIOLOGY

## 2021-11-24 RX ADMIN — IOHEXOL 100 ML: 350 INJECTION, SOLUTION INTRAVENOUS at 09:11

## 2021-11-30 ENCOUNTER — PATIENT MESSAGE (OUTPATIENT)
Dept: OTOLARYNGOLOGY | Facility: CLINIC | Age: 71
End: 2021-11-30

## 2021-12-01 DIAGNOSIS — Z12.11 COLON CANCER SCREENING: ICD-10-CM

## 2021-12-02 DIAGNOSIS — C32.9 LARYNX NEOPLASM MALIGNANT: Primary | ICD-10-CM

## 2021-12-02 DIAGNOSIS — T66.XXXS ADVERSE EFFECT OF RADIATION, SEQUELA: ICD-10-CM

## 2021-12-02 RX ORDER — LEVOFLOXACIN 500 MG/1
500 TABLET, FILM COATED ORAL DAILY
Qty: 14 TABLET | Refills: 1 | Status: ON HOLD | OUTPATIENT
Start: 2021-12-02 | End: 2021-12-09 | Stop reason: HOSPADM

## 2021-12-02 RX ORDER — LIDOCAINE HYDROCHLORIDE 10 MG/ML
1 INJECTION, SOLUTION EPIDURAL; INFILTRATION; INTRACAUDAL; PERINEURAL ONCE
Status: CANCELLED | OUTPATIENT
Start: 2021-12-02 | End: 2021-12-02

## 2021-12-02 RX ORDER — DEXAMETHASONE SODIUM PHOSPHATE 4 MG/ML
8 INJECTION, SOLUTION INTRA-ARTICULAR; INTRALESIONAL; INTRAMUSCULAR; INTRAVENOUS; SOFT TISSUE
Status: CANCELLED | OUTPATIENT
Start: 2021-12-02

## 2021-12-08 ENCOUNTER — TELEPHONE (OUTPATIENT)
Dept: OTOLARYNGOLOGY | Facility: CLINIC | Age: 71
End: 2021-12-08

## 2021-12-08 ENCOUNTER — ANESTHESIA EVENT (OUTPATIENT)
Dept: SURGERY | Facility: HOSPITAL | Age: 71
End: 2021-12-08
Payer: MEDICARE

## 2021-12-09 ENCOUNTER — ANESTHESIA (OUTPATIENT)
Dept: SURGERY | Facility: HOSPITAL | Age: 71
End: 2021-12-09
Payer: MEDICARE

## 2021-12-09 ENCOUNTER — HOSPITAL ENCOUNTER (OUTPATIENT)
Facility: HOSPITAL | Age: 71
Discharge: HOME | End: 2021-12-09
Attending: OTOLARYNGOLOGY | Admitting: OTOLARYNGOLOGY
Payer: MEDICARE

## 2021-12-09 ENCOUNTER — PATIENT MESSAGE (OUTPATIENT)
Dept: SURGERY | Facility: HOSPITAL | Age: 71
End: 2021-12-09

## 2021-12-09 VITALS
SYSTOLIC BLOOD PRESSURE: 149 MMHG | WEIGHT: 145 LBS | DIASTOLIC BLOOD PRESSURE: 77 MMHG | RESPIRATION RATE: 20 BRPM | OXYGEN SATURATION: 100 % | BODY MASS INDEX: 23.3 KG/M2 | TEMPERATURE: 98 F | HEART RATE: 72 BPM | HEIGHT: 66 IN

## 2021-12-09 DIAGNOSIS — T66.XXXS ADVERSE EFFECT OF RADIATION, SEQUELA: ICD-10-CM

## 2021-12-09 DIAGNOSIS — C32.0 MALIGNANT NEOPLASM OF GLOTTIS: ICD-10-CM

## 2021-12-09 DIAGNOSIS — C32.9 LARYNX NEOPLASM MALIGNANT: Primary | ICD-10-CM

## 2021-12-09 PROBLEM — T88.4XXA HARD TO INTUBATE: Status: ACTIVE | Noted: 2021-12-09

## 2021-12-09 LAB — POCT GLUCOSE: 135 MG/DL (ref 70–110)

## 2021-12-09 PROCEDURE — 31536 LARYNGOSCOPY W/BX & OP SCOPE: CPT | Mod: ,,, | Performed by: OTOLARYNGOLOGY

## 2021-12-09 PROCEDURE — D9220A PRA ANESTHESIA: ICD-10-PCS | Mod: ,,, | Performed by: ANESTHESIOLOGY

## 2021-12-09 PROCEDURE — D9220A PRA ANESTHESIA: Mod: ,,, | Performed by: ANESTHESIOLOGY

## 2021-12-09 PROCEDURE — 71000033 HC RECOVERY, INTIAL HOUR: Performed by: OTOLARYNGOLOGY

## 2021-12-09 PROCEDURE — 71000016 HC POSTOP RECOV ADDL HR: Performed by: OTOLARYNGOLOGY

## 2021-12-09 PROCEDURE — 82962 GLUCOSE BLOOD TEST: CPT | Performed by: OTOLARYNGOLOGY

## 2021-12-09 PROCEDURE — 63600175 PHARM REV CODE 636 W HCPCS: Performed by: STUDENT IN AN ORGANIZED HEALTH CARE EDUCATION/TRAINING PROGRAM

## 2021-12-09 PROCEDURE — 36000709 HC OR TIME LEV III EA ADD 15 MIN: Performed by: OTOLARYNGOLOGY

## 2021-12-09 PROCEDURE — 25000003 PHARM REV CODE 250: Performed by: STUDENT IN AN ORGANIZED HEALTH CARE EDUCATION/TRAINING PROGRAM

## 2021-12-09 PROCEDURE — 31536 PR LARYNGOSCOPY,DIRCT,OP SCOPE,BIOPSY: ICD-10-PCS | Mod: ,,, | Performed by: OTOLARYNGOLOGY

## 2021-12-09 PROCEDURE — 63600175 PHARM REV CODE 636 W HCPCS: Performed by: OTOLARYNGOLOGY

## 2021-12-09 PROCEDURE — 37000009 HC ANESTHESIA EA ADD 15 MINS: Performed by: OTOLARYNGOLOGY

## 2021-12-09 PROCEDURE — 37000008 HC ANESTHESIA 1ST 15 MINUTES: Performed by: OTOLARYNGOLOGY

## 2021-12-09 PROCEDURE — 25000003 PHARM REV CODE 250: Performed by: OTOLARYNGOLOGY

## 2021-12-09 PROCEDURE — 71000015 HC POSTOP RECOV 1ST HR: Performed by: OTOLARYNGOLOGY

## 2021-12-09 PROCEDURE — 36000708 HC OR TIME LEV III 1ST 15 MIN: Performed by: OTOLARYNGOLOGY

## 2021-12-09 PROCEDURE — 88305 TISSUE EXAM BY PATHOLOGIST: ICD-10-PCS | Mod: 26,,, | Performed by: PATHOLOGY

## 2021-12-09 PROCEDURE — 88305 TISSUE EXAM BY PATHOLOGIST: CPT | Mod: 59 | Performed by: PATHOLOGY

## 2021-12-09 PROCEDURE — 88305 TISSUE EXAM BY PATHOLOGIST: CPT | Mod: 26,,, | Performed by: PATHOLOGY

## 2021-12-09 RX ORDER — EPINEPHRINE 1 MG/ML
INJECTION, SOLUTION INTRACARDIAC; INTRAMUSCULAR; INTRAVENOUS; SUBCUTANEOUS
Status: DISCONTINUED | OUTPATIENT
Start: 2021-12-09 | End: 2021-12-09 | Stop reason: HOSPADM

## 2021-12-09 RX ORDER — DEXMEDETOMIDINE HYDROCHLORIDE 100 UG/ML
INJECTION, SOLUTION INTRAVENOUS
Status: DISCONTINUED | OUTPATIENT
Start: 2021-12-09 | End: 2021-12-09

## 2021-12-09 RX ORDER — LIDOCAINE HYDROCHLORIDE 20 MG/ML
INJECTION INTRAVENOUS
Status: DISCONTINUED | OUTPATIENT
Start: 2021-12-09 | End: 2021-12-09

## 2021-12-09 RX ORDER — HYDROCODONE BITARTRATE AND ACETAMINOPHEN 5; 325 MG/1; MG/1
1 TABLET ORAL EVERY 6 HOURS PRN
Qty: 15 TABLET | Refills: 0 | Status: ON HOLD | OUTPATIENT
Start: 2021-12-09 | End: 2022-01-14 | Stop reason: HOSPADM

## 2021-12-09 RX ORDER — PHENYLEPHRINE HYDROCHLORIDE 10 MG/ML
INJECTION INTRAVENOUS
Status: DISCONTINUED | OUTPATIENT
Start: 2021-12-09 | End: 2021-12-09

## 2021-12-09 RX ORDER — DEXAMETHASONE SODIUM PHOSPHATE 4 MG/ML
INJECTION, SOLUTION INTRA-ARTICULAR; INTRALESIONAL; INTRAMUSCULAR; INTRAVENOUS; SOFT TISSUE
Status: DISCONTINUED | OUTPATIENT
Start: 2021-12-09 | End: 2021-12-09

## 2021-12-09 RX ORDER — FENTANYL CITRATE 50 UG/ML
25 INJECTION, SOLUTION INTRAMUSCULAR; INTRAVENOUS EVERY 5 MIN PRN
Status: COMPLETED | OUTPATIENT
Start: 2021-12-09 | End: 2021-12-09

## 2021-12-09 RX ORDER — DEXAMETHASONE SODIUM PHOSPHATE 4 MG/ML
8 INJECTION, SOLUTION INTRA-ARTICULAR; INTRALESIONAL; INTRAMUSCULAR; INTRAVENOUS; SOFT TISSUE
Status: DISCONTINUED | OUTPATIENT
Start: 2021-12-09 | End: 2021-12-09 | Stop reason: HOSPADM

## 2021-12-09 RX ORDER — PROCHLORPERAZINE EDISYLATE 5 MG/ML
5 INJECTION INTRAMUSCULAR; INTRAVENOUS EVERY 30 MIN PRN
Status: DISCONTINUED | OUTPATIENT
Start: 2021-12-09 | End: 2021-12-09 | Stop reason: HOSPADM

## 2021-12-09 RX ORDER — FENTANYL CITRATE 50 UG/ML
INJECTION, SOLUTION INTRAMUSCULAR; INTRAVENOUS
Status: DISCONTINUED | OUTPATIENT
Start: 2021-12-09 | End: 2021-12-09

## 2021-12-09 RX ORDER — LIDOCAINE HYDROCHLORIDE 10 MG/ML
1 INJECTION, SOLUTION EPIDURAL; INFILTRATION; INTRACAUDAL; PERINEURAL ONCE
Status: COMPLETED | OUTPATIENT
Start: 2021-12-09 | End: 2021-12-09

## 2021-12-09 RX ORDER — HYDROCODONE BITARTRATE AND ACETAMINOPHEN 5; 325 MG/1; MG/1
1 TABLET ORAL EVERY 6 HOURS PRN
Qty: 15 TABLET | Refills: 0 | Status: SHIPPED | OUTPATIENT
Start: 2021-12-09 | End: 2021-12-09 | Stop reason: SDUPTHER

## 2021-12-09 RX ORDER — PROPOFOL 10 MG/ML
VIAL (ML) INTRAVENOUS
Status: DISCONTINUED | OUTPATIENT
Start: 2021-12-09 | End: 2021-12-09

## 2021-12-09 RX ORDER — ONDANSETRON 2 MG/ML
INJECTION INTRAMUSCULAR; INTRAVENOUS
Status: DISCONTINUED | OUTPATIENT
Start: 2021-12-09 | End: 2021-12-09

## 2021-12-09 RX ORDER — ROCURONIUM BROMIDE 10 MG/ML
INJECTION, SOLUTION INTRAVENOUS
Status: DISCONTINUED | OUTPATIENT
Start: 2021-12-09 | End: 2021-12-09

## 2021-12-09 RX ORDER — MIDAZOLAM HYDROCHLORIDE 1 MG/ML
INJECTION, SOLUTION INTRAMUSCULAR; INTRAVENOUS
Status: DISCONTINUED | OUTPATIENT
Start: 2021-12-09 | End: 2021-12-09

## 2021-12-09 RX ORDER — SODIUM CHLORIDE 0.9 % (FLUSH) 0.9 %
10 SYRINGE (ML) INJECTION
Status: DISCONTINUED | OUTPATIENT
Start: 2021-12-09 | End: 2021-12-09 | Stop reason: HOSPADM

## 2021-12-09 RX ADMIN — PROPOFOL 30 MG: 10 INJECTION, EMULSION INTRAVENOUS at 12:12

## 2021-12-09 RX ADMIN — DEXMEDETOMIDINE HYDROCHLORIDE 12 MCG: 100 INJECTION, SOLUTION, CONCENTRATE INTRAVENOUS at 12:12

## 2021-12-09 RX ADMIN — FENTANYL CITRATE 50 MCG: 50 INJECTION, SOLUTION INTRAMUSCULAR; INTRAVENOUS at 12:12

## 2021-12-09 RX ADMIN — PHENYLEPHRINE HYDROCHLORIDE 200 MCG: 10 INJECTION INTRAVENOUS at 12:12

## 2021-12-09 RX ADMIN — ROCURONIUM BROMIDE 50 MG: 10 INJECTION, SOLUTION INTRAVENOUS at 12:12

## 2021-12-09 RX ADMIN — MIDAZOLAM HYDROCHLORIDE 1 MG: 1 INJECTION, SOLUTION INTRAMUSCULAR; INTRAVENOUS at 12:12

## 2021-12-09 RX ADMIN — SODIUM CHLORIDE: 0.9 INJECTION, SOLUTION INTRAVENOUS at 12:12

## 2021-12-09 RX ADMIN — SUGAMMADEX 400 MG: 100 INJECTION, SOLUTION INTRAVENOUS at 01:12

## 2021-12-09 RX ADMIN — FENTANYL CITRATE 25 MCG: 50 INJECTION INTRAMUSCULAR; INTRAVENOUS at 02:12

## 2021-12-09 RX ADMIN — DEXAMETHASONE SODIUM PHOSPHATE 12 MG: 4 INJECTION, SOLUTION INTRAMUSCULAR; INTRAVENOUS at 12:12

## 2021-12-09 RX ADMIN — LIDOCAINE HYDROCHLORIDE 70 MG: 20 INJECTION, SOLUTION INTRAVENOUS at 12:12

## 2021-12-09 RX ADMIN — ONDANSETRON 4 MG: 2 INJECTION INTRAMUSCULAR; INTRAVENOUS at 12:12

## 2021-12-09 RX ADMIN — PROPOFOL 60 MG: 10 INJECTION, EMULSION INTRAVENOUS at 12:12

## 2021-12-09 RX ADMIN — PHENYLEPHRINE HYDROCHLORIDE 100 MCG: 10 INJECTION INTRAVENOUS at 12:12

## 2021-12-09 RX ADMIN — LIDOCAINE HYDROCHLORIDE 10 MG: 10 INJECTION, SOLUTION EPIDURAL; INFILTRATION; INTRACAUDAL at 10:12

## 2021-12-15 ENCOUNTER — TELEPHONE (OUTPATIENT)
Dept: SPEECH THERAPY | Facility: HOSPITAL | Age: 71
End: 2021-12-15

## 2021-12-15 ENCOUNTER — HOSPITAL ENCOUNTER (OUTPATIENT)
Dept: RADIOLOGY | Facility: HOSPITAL | Age: 71
Discharge: HOME | End: 2021-12-15
Attending: OTOLARYNGOLOGY
Payer: MEDICARE

## 2021-12-15 VITALS — WEIGHT: 140 LBS | HEIGHT: 67 IN | BODY MASS INDEX: 21.97 KG/M2

## 2021-12-15 DIAGNOSIS — C32.9 LARYNX NEOPLASM MALIGNANT: Primary | ICD-10-CM

## 2021-12-15 DIAGNOSIS — C32.9 LARYNX NEOPLASM MALIGNANT: ICD-10-CM

## 2021-12-15 DIAGNOSIS — T66.XXXS ADVERSE EFFECT OF RADIATION, SEQUELA: ICD-10-CM

## 2021-12-15 DIAGNOSIS — C32.0 MALIGNANT NEOPLASM OF GLOTTIS: ICD-10-CM

## 2021-12-15 DIAGNOSIS — R13.14 DYSPHAGIA, PHARYNGOESOPHAGEAL: ICD-10-CM

## 2021-12-15 LAB
FINAL PATHOLOGIC DIAGNOSIS: NORMAL
GLUCOSE SERPL-MCNC: 127 MG/DL (ref 70–110)
GROSS: NORMAL
Lab: NORMAL

## 2021-12-15 PROCEDURE — 78815 PET IMAGE W/CT SKULL-THIGH: CPT | Mod: TC,PO,PS

## 2021-12-15 PROCEDURE — 82962 GLUCOSE BLOOD TEST: CPT | Mod: PO

## 2021-12-16 ENCOUNTER — TELEPHONE (OUTPATIENT)
Dept: SPEECH THERAPY | Facility: HOSPITAL | Age: 71
End: 2021-12-16

## 2021-12-16 ENCOUNTER — TELEPHONE (OUTPATIENT)
Dept: HEMATOLOGY/ONCOLOGY | Facility: CLINIC | Age: 71
End: 2021-12-16

## 2021-12-17 ENCOUNTER — OFFICE VISIT (OUTPATIENT)
Dept: OTOLARYNGOLOGY | Facility: CLINIC | Age: 71
End: 2021-12-17
Payer: MEDICARE

## 2021-12-17 VITALS
SYSTOLIC BLOOD PRESSURE: 138 MMHG | TEMPERATURE: 98 F | BODY MASS INDEX: 22.05 KG/M2 | DIASTOLIC BLOOD PRESSURE: 87 MMHG | WEIGHT: 138.69 LBS | HEART RATE: 78 BPM

## 2021-12-17 DIAGNOSIS — C32.9 LARYNX NEOPLASM MALIGNANT: Primary | ICD-10-CM

## 2021-12-17 DIAGNOSIS — C32.9 LARYNX CANCER: ICD-10-CM

## 2021-12-17 PROCEDURE — 3066F NEPHROPATHY DOC TX: CPT | Mod: CPTII,S$GLB,, | Performed by: OTOLARYNGOLOGY

## 2021-12-17 PROCEDURE — 3072F PR LOW RISK FOR RETINOPATHY: ICD-10-PCS | Mod: CPTII,S$GLB,, | Performed by: OTOLARYNGOLOGY

## 2021-12-17 PROCEDURE — 3060F PR POS MICROALBUMINURIA RESULT DOCUMENTED/REVIEW: ICD-10-PCS | Mod: CPTII,S$GLB,, | Performed by: OTOLARYNGOLOGY

## 2021-12-17 PROCEDURE — 99214 PR OFFICE/OUTPT VISIT, EST, LEVL IV, 30-39 MIN: ICD-10-PCS | Mod: S$GLB,,, | Performed by: OTOLARYNGOLOGY

## 2021-12-17 PROCEDURE — 99999 PR PBB SHADOW E&M-EST. PATIENT-LVL III: ICD-10-PCS | Mod: PBBFAC,,, | Performed by: OTOLARYNGOLOGY

## 2021-12-17 PROCEDURE — 3066F PR DOCUMENTATION OF TREATMENT FOR NEPHROPATHY: ICD-10-PCS | Mod: CPTII,S$GLB,, | Performed by: OTOLARYNGOLOGY

## 2021-12-17 PROCEDURE — 4010F ACE/ARB THERAPY RXD/TAKEN: CPT | Mod: CPTII,S$GLB,, | Performed by: OTOLARYNGOLOGY

## 2021-12-17 PROCEDURE — 3072F LOW RISK FOR RETINOPATHY: CPT | Mod: CPTII,S$GLB,, | Performed by: OTOLARYNGOLOGY

## 2021-12-17 PROCEDURE — 99214 OFFICE O/P EST MOD 30 MIN: CPT | Mod: S$GLB,,, | Performed by: OTOLARYNGOLOGY

## 2021-12-17 PROCEDURE — 3060F POS MICROALBUMINURIA REV: CPT | Mod: CPTII,S$GLB,, | Performed by: OTOLARYNGOLOGY

## 2021-12-17 PROCEDURE — 4010F PR ACE/ARB THEARPY RXD/TAKEN: ICD-10-PCS | Mod: CPTII,S$GLB,, | Performed by: OTOLARYNGOLOGY

## 2021-12-17 PROCEDURE — 99999 PR PBB SHADOW E&M-EST. PATIENT-LVL III: CPT | Mod: PBBFAC,,, | Performed by: OTOLARYNGOLOGY

## 2021-12-18 RX ORDER — SODIUM CHLORIDE 0.9 % (FLUSH) 0.9 %
10 SYRINGE (ML) INJECTION
Status: CANCELLED | OUTPATIENT
Start: 2021-12-18

## 2021-12-18 RX ORDER — LIDOCAINE HYDROCHLORIDE 10 MG/ML
1 INJECTION, SOLUTION EPIDURAL; INFILTRATION; INTRACAUDAL; PERINEURAL ONCE
Status: CANCELLED | OUTPATIENT
Start: 2021-12-18 | End: 2021-12-18

## 2021-12-20 ENCOUNTER — CLINICAL SUPPORT (OUTPATIENT)
Dept: SPEECH THERAPY | Facility: HOSPITAL | Age: 71
End: 2021-12-20
Attending: OTOLARYNGOLOGY
Payer: MEDICARE

## 2021-12-20 DIAGNOSIS — R13.14 DYSPHAGIA, PHARYNGOESOPHAGEAL: ICD-10-CM

## 2021-12-20 DIAGNOSIS — C32.9 LARYNX NEOPLASM MALIGNANT: ICD-10-CM

## 2021-12-20 DIAGNOSIS — R49.0 DYSPHONIA: ICD-10-CM

## 2021-12-20 DIAGNOSIS — T66.XXXS ADVERSE EFFECT OF RADIATION, SEQUELA: ICD-10-CM

## 2021-12-20 PROCEDURE — 92524 BEHAVRAL QUALIT ANALYS VOICE: CPT | Mod: GN

## 2021-12-21 ENCOUNTER — OFFICE VISIT (OUTPATIENT)
Dept: HEMATOLOGY/ONCOLOGY | Facility: CLINIC | Age: 71
End: 2021-12-21
Payer: MEDICARE

## 2021-12-21 VITALS
WEIGHT: 146.19 LBS | BODY MASS INDEX: 23.49 KG/M2 | HEART RATE: 79 BPM | OXYGEN SATURATION: 100 % | SYSTOLIC BLOOD PRESSURE: 145 MMHG | HEIGHT: 66 IN | DIASTOLIC BLOOD PRESSURE: 71 MMHG | RESPIRATION RATE: 20 BRPM | TEMPERATURE: 98 F

## 2021-12-21 DIAGNOSIS — R13.14 DYSPHAGIA, PHARYNGOESOPHAGEAL: ICD-10-CM

## 2021-12-21 DIAGNOSIS — C32.9 LARYNX NEOPLASM MALIGNANT: ICD-10-CM

## 2021-12-21 DIAGNOSIS — T66.XXXS ADVERSE EFFECT OF RADIATION, SEQUELA: ICD-10-CM

## 2021-12-21 PROCEDURE — 3066F NEPHROPATHY DOC TX: CPT | Mod: CPTII,S$GLB,, | Performed by: INTERNAL MEDICINE

## 2021-12-21 PROCEDURE — 4010F PR ACE/ARB THEARPY RXD/TAKEN: ICD-10-PCS | Mod: CPTII,S$GLB,, | Performed by: INTERNAL MEDICINE

## 2021-12-21 PROCEDURE — 4010F ACE/ARB THERAPY RXD/TAKEN: CPT | Mod: CPTII,S$GLB,, | Performed by: INTERNAL MEDICINE

## 2021-12-21 PROCEDURE — 3060F PR POS MICROALBUMINURIA RESULT DOCUMENTED/REVIEW: ICD-10-PCS | Mod: CPTII,S$GLB,, | Performed by: INTERNAL MEDICINE

## 2021-12-21 PROCEDURE — 3072F PR LOW RISK FOR RETINOPATHY: ICD-10-PCS | Mod: CPTII,S$GLB,, | Performed by: INTERNAL MEDICINE

## 2021-12-21 PROCEDURE — 3060F POS MICROALBUMINURIA REV: CPT | Mod: CPTII,S$GLB,, | Performed by: INTERNAL MEDICINE

## 2021-12-21 PROCEDURE — 99204 OFFICE O/P NEW MOD 45 MIN: CPT | Mod: S$GLB,,, | Performed by: INTERNAL MEDICINE

## 2021-12-21 PROCEDURE — 99999 PR PBB SHADOW E&M-EST. PATIENT-LVL V: ICD-10-PCS | Mod: PBBFAC,,, | Performed by: INTERNAL MEDICINE

## 2021-12-21 PROCEDURE — 99204 PR OFFICE/OUTPT VISIT, NEW, LEVL IV, 45-59 MIN: ICD-10-PCS | Mod: S$GLB,,, | Performed by: INTERNAL MEDICINE

## 2021-12-21 PROCEDURE — 99999 PR PBB SHADOW E&M-EST. PATIENT-LVL V: CPT | Mod: PBBFAC,,, | Performed by: INTERNAL MEDICINE

## 2021-12-21 PROCEDURE — 3066F PR DOCUMENTATION OF TREATMENT FOR NEPHROPATHY: ICD-10-PCS | Mod: CPTII,S$GLB,, | Performed by: INTERNAL MEDICINE

## 2021-12-21 PROCEDURE — 3072F LOW RISK FOR RETINOPATHY: CPT | Mod: CPTII,S$GLB,, | Performed by: INTERNAL MEDICINE

## 2021-12-22 ENCOUNTER — PATIENT MESSAGE (OUTPATIENT)
Dept: SPEECH THERAPY | Facility: HOSPITAL | Age: 71
End: 2021-12-22

## 2021-12-23 ENCOUNTER — PATIENT MESSAGE (OUTPATIENT)
Dept: OTOLARYNGOLOGY | Facility: CLINIC | Age: 71
End: 2021-12-23

## 2021-12-23 RX ORDER — BENZONATATE 200 MG/1
200 CAPSULE ORAL 3 TIMES DAILY PRN
Qty: 30 CAPSULE | Refills: 1 | Status: ON HOLD | OUTPATIENT
Start: 2021-12-23 | End: 2022-01-14 | Stop reason: HOSPADM

## 2021-12-24 ENCOUNTER — PATIENT MESSAGE (OUTPATIENT)
Dept: OTOLARYNGOLOGY | Facility: CLINIC | Age: 71
End: 2021-12-24

## 2021-12-24 ENCOUNTER — HOSPITAL ENCOUNTER (EMERGENCY)
Facility: HOSPITAL | Age: 71
Discharge: ANOTHER HOSPITAL | End: 2021-12-25
Attending: EMERGENCY MEDICINE
Payer: MEDICARE

## 2021-12-24 DIAGNOSIS — R04.2 HEMOPTYSIS: ICD-10-CM

## 2021-12-24 DIAGNOSIS — J38.7 LARYNGEAL MASS: Primary | ICD-10-CM

## 2021-12-24 LAB
ABO + RH BLD: NORMAL
ALBUMIN SERPL BCP-MCNC: 3.3 G/DL (ref 3.5–5.2)
ALP SERPL-CCNC: 72 U/L (ref 55–135)
ALT SERPL W/O P-5'-P-CCNC: 13 U/L (ref 10–44)
ANION GAP SERPL CALC-SCNC: 8 MMOL/L (ref 8–16)
APTT PPP: 39.7 SEC (ref 23.3–35.1)
AST SERPL-CCNC: 14 U/L (ref 10–40)
BACTERIA #/AREA URNS HPF: NEGATIVE /HPF
BASOPHILS # BLD AUTO: 0.02 K/UL (ref 0–0.2)
BASOPHILS NFR BLD: 0.2 % (ref 0–1.9)
BILIRUB SERPL-MCNC: 1 MG/DL (ref 0.1–1)
BILIRUB UR QL STRIP: NEGATIVE
BLD GP AB SCN CELLS X3 SERPL QL: NORMAL
BUN SERPL-MCNC: 29 MG/DL (ref 8–23)
CALCIUM SERPL-MCNC: 8.8 MG/DL (ref 8.7–10.5)
CHLORIDE SERPL-SCNC: 102 MMOL/L (ref 95–110)
CK MB SERPL-MCNC: 0.7 NG/ML (ref 0.1–6.5)
CK SERPL-CCNC: 48 U/L (ref 20–200)
CLARITY UR: CLEAR
CO2 SERPL-SCNC: 23 MMOL/L (ref 23–29)
COLOR UR: YELLOW
CREAT SERPL-MCNC: 1 MG/DL (ref 0.5–1.4)
CREAT SERPL-MCNC: 1 MG/DL (ref 0.5–1.4)
DIFFERENTIAL METHOD: ABNORMAL
EOSINOPHIL # BLD AUTO: 0.1 K/UL (ref 0–0.5)
EOSINOPHIL NFR BLD: 0.8 % (ref 0–8)
ERYTHROCYTE [DISTWIDTH] IN BLOOD BY AUTOMATED COUNT: 12 % (ref 11.5–14.5)
EST. GFR  (AFRICAN AMERICAN): >60 ML/MIN/1.73 M^2
EST. GFR  (NON AFRICAN AMERICAN): >60 ML/MIN/1.73 M^2
GLUCOSE SERPL-MCNC: 221 MG/DL (ref 70–110)
GLUCOSE UR QL STRIP: ABNORMAL
HCT VFR BLD AUTO: 25.9 % (ref 40–54)
HGB BLD-MCNC: 8.6 G/DL (ref 14–18)
HGB UR QL STRIP: NEGATIVE
HYALINE CASTS #/AREA URNS LPF: 0 /LPF
IMM GRANULOCYTES # BLD AUTO: 0.04 K/UL (ref 0–0.04)
IMM GRANULOCYTES NFR BLD AUTO: 0.5 % (ref 0–0.5)
INR PPP: 1.3
KETONES UR QL STRIP: ABNORMAL
LEUKOCYTE ESTERASE UR QL STRIP: NEGATIVE
LYMPHOCYTES # BLD AUTO: 0.4 K/UL (ref 1–4.8)
LYMPHOCYTES NFR BLD: 5.1 % (ref 18–48)
MAGNESIUM SERPL-MCNC: 1.6 MG/DL (ref 1.6–2.6)
MCH RBC QN AUTO: 29.1 PG (ref 27–31)
MCHC RBC AUTO-ENTMCNC: 33.2 G/DL (ref 32–36)
MCV RBC AUTO: 88 FL (ref 82–98)
MICROSCOPIC COMMENT: NORMAL
MONOCYTES # BLD AUTO: 0.4 K/UL (ref 0.3–1)
MONOCYTES NFR BLD: 4.9 % (ref 4–15)
NEUTROPHILS # BLD AUTO: 7.7 K/UL (ref 1.8–7.7)
NEUTROPHILS NFR BLD: 88.5 % (ref 38–73)
NITRITE UR QL STRIP: NEGATIVE
NRBC BLD-RTO: 0 /100 WBC
PH UR STRIP: 7 [PH] (ref 5–8)
PLATELET # BLD AUTO: 220 K/UL (ref 150–450)
PMV BLD AUTO: 10.6 FL (ref 9.2–12.9)
POTASSIUM SERPL-SCNC: 3.9 MMOL/L (ref 3.5–5.1)
PROT SERPL-MCNC: 6 G/DL (ref 6–8.4)
PROT UR QL STRIP: ABNORMAL
PROTHROMBIN TIME: 15.3 SEC (ref 11.4–13.7)
RBC # BLD AUTO: 2.96 M/UL (ref 4.6–6.2)
RBC #/AREA URNS HPF: 0 /HPF (ref 0–4)
SAMPLE: NORMAL
SARS-COV-2 RDRP RESP QL NAA+PROBE: NEGATIVE
SODIUM SERPL-SCNC: 133 MMOL/L (ref 136–145)
SP GR UR STRIP: 1.02 (ref 1–1.03)
SQUAMOUS #/AREA URNS HPF: 1 /HPF
TROPONIN I SERPL DL<=0.01 NG/ML-MCNC: <0.03 NG/ML
URN SPEC COLLECT METH UR: ABNORMAL
UROBILINOGEN UR STRIP-ACNC: NEGATIVE EU/DL
WBC # BLD AUTO: 8.65 K/UL (ref 3.9–12.7)
WBC #/AREA URNS HPF: 0 /HPF (ref 0–5)
YEAST URNS QL MICRO: NORMAL

## 2021-12-24 PROCEDURE — 25500020 PHARM REV CODE 255: Performed by: EMERGENCY MEDICINE

## 2021-12-24 PROCEDURE — 93005 ELECTROCARDIOGRAM TRACING: CPT | Performed by: SPECIALIST

## 2021-12-24 PROCEDURE — 80053 COMPREHEN METABOLIC PANEL: CPT | Performed by: EMERGENCY MEDICINE

## 2021-12-24 PROCEDURE — 93010 ELECTROCARDIOGRAM REPORT: CPT | Mod: ,,, | Performed by: SPECIALIST

## 2021-12-24 PROCEDURE — U0002 COVID-19 LAB TEST NON-CDC: HCPCS | Performed by: EMERGENCY MEDICINE

## 2021-12-24 PROCEDURE — 85730 THROMBOPLASTIN TIME PARTIAL: CPT | Performed by: EMERGENCY MEDICINE

## 2021-12-24 PROCEDURE — 83735 ASSAY OF MAGNESIUM: CPT | Performed by: EMERGENCY MEDICINE

## 2021-12-24 PROCEDURE — 85610 PROTHROMBIN TIME: CPT | Performed by: EMERGENCY MEDICINE

## 2021-12-24 PROCEDURE — 93010 EKG 12-LEAD: ICD-10-PCS | Mod: ,,, | Performed by: SPECIALIST

## 2021-12-24 PROCEDURE — 82553 CREATINE MB FRACTION: CPT | Performed by: EMERGENCY MEDICINE

## 2021-12-24 PROCEDURE — 82550 ASSAY OF CK (CPK): CPT | Performed by: EMERGENCY MEDICINE

## 2021-12-24 PROCEDURE — 85025 COMPLETE CBC W/AUTO DIFF WBC: CPT | Performed by: EMERGENCY MEDICINE

## 2021-12-24 PROCEDURE — 36415 COLL VENOUS BLD VENIPUNCTURE: CPT | Performed by: EMERGENCY MEDICINE

## 2021-12-24 PROCEDURE — 99285 EMERGENCY DEPT VISIT HI MDM: CPT | Mod: 25

## 2021-12-24 PROCEDURE — 86900 BLOOD TYPING SEROLOGIC ABO: CPT | Performed by: EMERGENCY MEDICINE

## 2021-12-24 PROCEDURE — 81001 URINALYSIS AUTO W/SCOPE: CPT | Performed by: EMERGENCY MEDICINE

## 2021-12-24 PROCEDURE — 84484 ASSAY OF TROPONIN QUANT: CPT | Performed by: EMERGENCY MEDICINE

## 2021-12-24 RX ADMIN — IOHEXOL 100 ML: 350 INJECTION, SOLUTION INTRAVENOUS at 08:12

## 2021-12-25 ENCOUNTER — HOSPITAL ENCOUNTER (INPATIENT)
Facility: HOSPITAL | Age: 71
LOS: 20 days | Discharge: HOME | DRG: 012 | End: 2022-01-14
Attending: EMERGENCY MEDICINE | Admitting: OTOLARYNGOLOGY
Payer: MEDICARE

## 2021-12-25 VITALS
SYSTOLIC BLOOD PRESSURE: 117 MMHG | BODY MASS INDEX: 23.63 KG/M2 | DIASTOLIC BLOOD PRESSURE: 71 MMHG | RESPIRATION RATE: 17 BRPM | TEMPERATURE: 99 F | OXYGEN SATURATION: 98 % | HEIGHT: 66 IN | WEIGHT: 147 LBS | HEART RATE: 108 BPM

## 2021-12-25 DIAGNOSIS — T38.0X5S ADVERSE EFFECT OF ADRENAL CORTICAL STEROIDS, SEQUELA: ICD-10-CM

## 2021-12-25 DIAGNOSIS — E78.5 HYPERLIPIDEMIA, UNSPECIFIED HYPERLIPIDEMIA TYPE: Chronic | ICD-10-CM

## 2021-12-25 DIAGNOSIS — I48.0 PAROXYSMAL ATRIAL FIBRILLATION: Chronic | ICD-10-CM

## 2021-12-25 DIAGNOSIS — E11.65 TYPE 2 DIABETES MELLITUS WITH HYPERGLYCEMIA, WITHOUT LONG-TERM CURRENT USE OF INSULIN: ICD-10-CM

## 2021-12-25 DIAGNOSIS — Z90.02 S/P LARYNGECTOMY: ICD-10-CM

## 2021-12-25 DIAGNOSIS — Z78.9 ON ENTERAL NUTRITION: ICD-10-CM

## 2021-12-25 DIAGNOSIS — R04.2 HEMOPTYSIS: ICD-10-CM

## 2021-12-25 DIAGNOSIS — C32.9 LARYNX NEOPLASM MALIGNANT: Primary | ICD-10-CM

## 2021-12-25 PROBLEM — K21.9 GASTROESOPHAGEAL REFLUX DISEASE WITHOUT ESOPHAGITIS: Chronic | Status: ACTIVE | Noted: 2019-01-30

## 2021-12-25 PROBLEM — E11.9 TYPE 2 DIABETES MELLITUS WITHOUT COMPLICATION, WITH LONG-TERM CURRENT USE OF INSULIN: Chronic | Status: ACTIVE | Noted: 2019-01-30

## 2021-12-25 PROBLEM — I47.29 NSVT (NONSUSTAINED VENTRICULAR TACHYCARDIA): Chronic | Status: ACTIVE | Noted: 2021-01-13

## 2021-12-25 PROBLEM — E11.618 TYPE 2 DIABETES MELLITUS WITH DIABETIC ARTHROPATHY, WITH LONG-TERM CURRENT USE OF INSULIN: Chronic | Status: ACTIVE | Noted: 2017-07-03

## 2021-12-25 PROBLEM — Z79.4 TYPE 2 DIABETES MELLITUS WITHOUT COMPLICATION, WITH LONG-TERM CURRENT USE OF INSULIN: Chronic | Status: ACTIVE | Noted: 2019-01-30

## 2021-12-25 PROBLEM — Z79.4 TYPE 2 DIABETES MELLITUS WITH DIABETIC ARTHROPATHY, WITH LONG-TERM CURRENT USE OF INSULIN: Chronic | Status: ACTIVE | Noted: 2017-07-03

## 2021-12-25 PROBLEM — T88.4XXA HARD TO INTUBATE: Chronic | Status: ACTIVE | Noted: 2021-12-09

## 2021-12-25 LAB
ERYTHROCYTE [DISTWIDTH] IN BLOOD BY AUTOMATED COUNT: 12.2 % (ref 11.5–14.5)
ERYTHROCYTE [DISTWIDTH] IN BLOOD BY AUTOMATED COUNT: 12.7 % (ref 11.5–14.5)
HCT VFR BLD AUTO: 21.3 % (ref 40–54)
HCT VFR BLD AUTO: 23.8 % (ref 40–54)
HGB BLD-MCNC: 7 G/DL (ref 14–18)
HGB BLD-MCNC: 7.9 G/DL (ref 14–18)
MCH RBC QN AUTO: 28.8 PG (ref 27–31)
MCH RBC QN AUTO: 29.4 PG (ref 27–31)
MCHC RBC AUTO-ENTMCNC: 32.9 G/DL (ref 32–36)
MCHC RBC AUTO-ENTMCNC: 33.2 G/DL (ref 32–36)
MCV RBC AUTO: 87 FL (ref 82–98)
MCV RBC AUTO: 90 FL (ref 82–98)
PLATELET # BLD AUTO: 200 K/UL (ref 150–450)
PLATELET # BLD AUTO: 219 K/UL (ref 150–450)
PMV BLD AUTO: 10.6 FL (ref 9.2–12.9)
PMV BLD AUTO: 10.8 FL (ref 9.2–12.9)
POCT GLUCOSE: 155 MG/DL (ref 70–110)
POCT GLUCOSE: 213 MG/DL (ref 70–110)
RBC # BLD AUTO: 2.38 M/UL (ref 4.6–6.2)
RBC # BLD AUTO: 2.74 M/UL (ref 4.6–6.2)
WBC # BLD AUTO: 8.49 K/UL (ref 3.9–12.7)
WBC # BLD AUTO: 9.55 K/UL (ref 3.9–12.7)

## 2021-12-25 PROCEDURE — 63600175 PHARM REV CODE 636 W HCPCS: Performed by: STUDENT IN AN ORGANIZED HEALTH CARE EDUCATION/TRAINING PROGRAM

## 2021-12-25 PROCEDURE — 99284 EMERGENCY DEPT VISIT MOD MDM: CPT | Mod: GC,,, | Performed by: EMERGENCY MEDICINE

## 2021-12-25 PROCEDURE — 25000003 PHARM REV CODE 250: Performed by: OTOLARYNGOLOGY

## 2021-12-25 PROCEDURE — 25000003 PHARM REV CODE 250: Performed by: STUDENT IN AN ORGANIZED HEALTH CARE EDUCATION/TRAINING PROGRAM

## 2021-12-25 PROCEDURE — 94640 AIRWAY INHALATION TREATMENT: CPT

## 2021-12-25 PROCEDURE — 63600175 PHARM REV CODE 636 W HCPCS

## 2021-12-25 PROCEDURE — 99233 SBSQ HOSP IP/OBS HIGH 50: CPT | Mod: ,,, | Performed by: SURGERY

## 2021-12-25 PROCEDURE — 99233 PR SUBSEQUENT HOSPITAL CARE,LEVL III: ICD-10-PCS | Mod: ,,, | Performed by: SURGERY

## 2021-12-25 PROCEDURE — 25000003 PHARM REV CODE 250: Performed by: EMERGENCY MEDICINE

## 2021-12-25 PROCEDURE — 99285 EMERGENCY DEPT VISIT HI MDM: CPT | Mod: HCNC

## 2021-12-25 PROCEDURE — 99221 PR INITIAL HOSPITAL CARE,LEVL I: ICD-10-PCS | Mod: AI,,, | Performed by: OTOLARYNGOLOGY

## 2021-12-25 PROCEDURE — 94761 N-INVAS EAR/PLS OXIMETRY MLT: CPT

## 2021-12-25 PROCEDURE — 99221 1ST HOSP IP/OBS SF/LOW 40: CPT | Mod: AI,,, | Performed by: OTOLARYNGOLOGY

## 2021-12-25 PROCEDURE — 99284 PR EMERGENCY DEPT VISIT,LEVEL IV: ICD-10-PCS | Mod: GC,,, | Performed by: EMERGENCY MEDICINE

## 2021-12-25 PROCEDURE — 99900035 HC TECH TIME PER 15 MIN (STAT)

## 2021-12-25 PROCEDURE — 85027 COMPLETE CBC AUTOMATED: CPT | Mod: 91 | Performed by: STUDENT IN AN ORGANIZED HEALTH CARE EDUCATION/TRAINING PROGRAM

## 2021-12-25 PROCEDURE — 20000000 HC ICU ROOM

## 2021-12-25 RX ORDER — SODIUM CHLORIDE 0.9 % (FLUSH) 0.9 %
10 SYRINGE (ML) INJECTION
Status: DISCONTINUED | OUTPATIENT
Start: 2021-12-25 | End: 2021-12-31

## 2021-12-25 RX ORDER — MAGNESIUM SULFATE HEPTAHYDRATE 40 MG/ML
4 INJECTION, SOLUTION INTRAVENOUS
Status: DISCONTINUED | OUTPATIENT
Start: 2021-12-25 | End: 2021-12-31

## 2021-12-25 RX ORDER — MORPHINE SULFATE 2 MG/ML
2 INJECTION, SOLUTION INTRAMUSCULAR; INTRAVENOUS EVERY 4 HOURS PRN
Status: DISCONTINUED | OUTPATIENT
Start: 2021-12-25 | End: 2021-12-25

## 2021-12-25 RX ORDER — GLUCAGON 1 MG
1 KIT INJECTION
Status: DISCONTINUED | OUTPATIENT
Start: 2021-12-25 | End: 2021-12-26

## 2021-12-25 RX ORDER — MAGNESIUM SULFATE HEPTAHYDRATE 40 MG/ML
2 INJECTION, SOLUTION INTRAVENOUS
Status: DISCONTINUED | OUTPATIENT
Start: 2021-12-25 | End: 2021-12-31

## 2021-12-25 RX ORDER — MUPIROCIN 20 MG/G
OINTMENT TOPICAL 2 TIMES DAILY
Status: COMPLETED | OUTPATIENT
Start: 2021-12-25 | End: 2021-12-29

## 2021-12-25 RX ORDER — LANOLIN ALCOHOL/MO/W.PET/CERES
800 CREAM (GRAM) TOPICAL
Status: DISCONTINUED | OUTPATIENT
Start: 2021-12-25 | End: 2021-12-27

## 2021-12-25 RX ORDER — DILTIAZEM HYDROCHLORIDE 120 MG/1
120 CAPSULE, COATED, EXTENDED RELEASE ORAL DAILY
Status: DISCONTINUED | OUTPATIENT
Start: 2021-12-25 | End: 2021-12-26

## 2021-12-25 RX ORDER — ENOXAPARIN SODIUM 100 MG/ML
40 INJECTION SUBCUTANEOUS EVERY 24 HOURS
Status: DISCONTINUED | OUTPATIENT
Start: 2021-12-25 | End: 2021-12-25

## 2021-12-25 RX ORDER — MORPHINE SULFATE 4 MG/ML
4 INJECTION, SOLUTION INTRAMUSCULAR; INTRAVENOUS EVERY 4 HOURS PRN
Status: DISCONTINUED | OUTPATIENT
Start: 2021-12-25 | End: 2021-12-25

## 2021-12-25 RX ORDER — ENOXAPARIN SODIUM 100 MG/ML
40 INJECTION SUBCUTANEOUS EVERY 24 HOURS
Status: DISCONTINUED | OUTPATIENT
Start: 2021-12-25 | End: 2022-01-07

## 2021-12-25 RX ORDER — POTASSIUM CHLORIDE 7.45 MG/ML
40 INJECTION INTRAVENOUS
Status: DISCONTINUED | OUTPATIENT
Start: 2021-12-25 | End: 2021-12-28

## 2021-12-25 RX ORDER — FAMOTIDINE 10 MG/ML
20 INJECTION INTRAVENOUS EVERY 12 HOURS
Status: DISCONTINUED | OUTPATIENT
Start: 2021-12-25 | End: 2021-12-28

## 2021-12-25 RX ORDER — HYDROCODONE BITARTRATE AND ACETAMINOPHEN 5; 325 MG/1; MG/1
1 TABLET ORAL EVERY 4 HOURS PRN
Status: DISCONTINUED | OUTPATIENT
Start: 2021-12-25 | End: 2021-12-25

## 2021-12-25 RX ORDER — INSULIN ASPART 100 [IU]/ML
0-5 INJECTION, SOLUTION INTRAVENOUS; SUBCUTANEOUS EVERY 6 HOURS PRN
Status: DISCONTINUED | OUTPATIENT
Start: 2021-12-25 | End: 2021-12-26

## 2021-12-25 RX ORDER — SODIUM CHLORIDE 0.9 % (FLUSH) 0.9 %
10 SYRINGE (ML) INJECTION
Status: DISCONTINUED | OUTPATIENT
Start: 2021-12-25 | End: 2022-01-07

## 2021-12-25 RX ORDER — SODIUM,POTASSIUM PHOSPHATES 280-250MG
1 POWDER IN PACKET (EA) ORAL ONCE
Status: COMPLETED | OUTPATIENT
Start: 2021-12-25 | End: 2021-12-25

## 2021-12-25 RX ORDER — SODIUM CHLORIDE, SODIUM LACTATE, POTASSIUM CHLORIDE, CALCIUM CHLORIDE 600; 310; 30; 20 MG/100ML; MG/100ML; MG/100ML; MG/100ML
INJECTION, SOLUTION INTRAVENOUS CONTINUOUS
Status: DISCONTINUED | OUTPATIENT
Start: 2021-12-25 | End: 2021-12-25

## 2021-12-25 RX ORDER — QUETIAPINE FUMARATE 25 MG/1
25 TABLET, FILM COATED ORAL NIGHTLY
Status: DISCONTINUED | OUTPATIENT
Start: 2021-12-25 | End: 2021-12-27

## 2021-12-25 RX ORDER — LOSARTAN POTASSIUM 50 MG/1
100 TABLET ORAL DAILY
Status: DISCONTINUED | OUTPATIENT
Start: 2021-12-25 | End: 2021-12-25

## 2021-12-25 RX ADMIN — INSULIN ASPART 1 UNITS: 100 INJECTION, SOLUTION INTRAVENOUS; SUBCUTANEOUS at 08:12

## 2021-12-25 RX ADMIN — ENOXAPARIN SODIUM 40 MG: 40 INJECTION SUBCUTANEOUS at 05:12

## 2021-12-25 RX ADMIN — TRANEXAMIC ACID 500 MG: 100 INJECTION, SOLUTION INTRAVENOUS at 01:12

## 2021-12-25 RX ADMIN — MUPIROCIN: 20 OINTMENT TOPICAL at 09:12

## 2021-12-25 RX ADMIN — POTASSIUM & SODIUM PHOSPHATES POWDER PACK 280-160-250 MG 1 PACKET: 280-160-250 PACK at 04:12

## 2021-12-25 RX ADMIN — TRANEXAMIC ACID 500 MG: 100 INJECTION, SOLUTION INTRAVENOUS at 05:12

## 2021-12-25 RX ADMIN — TRANEXAMIC ACID 500 MG: 1 INJECTION, SOLUTION INTRAVENOUS at 04:12

## 2021-12-25 RX ADMIN — FAMOTIDINE 20 MG: 10 INJECTION, SOLUTION INTRAVENOUS at 08:12

## 2021-12-25 RX ADMIN — SODIUM CHLORIDE, SODIUM LACTATE, POTASSIUM CHLORIDE, AND CALCIUM CHLORIDE: .6; .31; .03; .02 INJECTION, SOLUTION INTRAVENOUS at 06:12

## 2021-12-25 RX ADMIN — MAGNESIUM SULFATE 2 G: 2 INJECTION INTRAVENOUS at 06:12

## 2021-12-25 RX ADMIN — QUETIAPINE FUMARATE 25 MG: 25 TABLET ORAL at 08:12

## 2021-12-25 RX ADMIN — TRANEXAMIC ACID 500 MG: 100 INJECTION, SOLUTION INTRAVENOUS at 10:12

## 2021-12-25 RX ADMIN — DILTIAZEM HYDROCHLORIDE 120 MG: 120 CAPSULE, COATED, EXTENDED RELEASE ORAL at 09:12

## 2021-12-25 RX ADMIN — FAMOTIDINE 20 MG: 10 INJECTION, SOLUTION INTRAVENOUS at 09:12

## 2021-12-25 NOTE — SUBJECTIVE & OBJECTIVE
Interval History: Continued hemoptysis overnight. 2 tbsp per hour. Has not received repeat neb TXA which he felt helped. Small drop in Hgb to 7.8.     Medications:  Continuous Infusions:   lactated ringers 100 mL/hr at 12/25/21 1200     Scheduled Meds:   diltiaZEM  120 mg Oral Daily    enoxaparin  40 mg Subcutaneous Daily    famotidine (PF)  20 mg Intravenous Q12H    mupirocin   Nasal BID    QUEtiapine  25 mg Oral QHS     PRN Meds:calcium gluconate IVPB, calcium gluconate IVPB, calcium gluconate IVPB, dextrose 50%, glucagon (human recombinant), insulin aspart U-100, magnesium oxide, magnesium oxide, magnesium sulfate IVPB, magnesium sulfate IVPB, potassium bicarbonate, potassium bicarbonate, potassium bicarbonate, potassium chloride in water **AND** potassium chloride in water **AND** potassium chloride in water, sodium chloride 0.9%, sodium chloride 0.9%, tranexamic acid     Review of patient's allergies indicates:   Allergen Reactions    Pollen extracts     Lovastatin Rash     Not confirmed but pt skeptical     Objective:     Vital Signs (24h Range):  Temp:  [98.1 °F (36.7 °C)-99.4 °F (37.4 °C)] 98.1 °F (36.7 °C)  Pulse:  [] 101  Resp:  [16-36] 23  SpO2:  [95 %-100 %] 95 %  BP: (117-148)/(60-94) 148/66     Date 12/25/21 0700 - 12/26/21 0659   Shift 1626-5534 7621-2090 0124-6738 24 Hour Total   INTAKE   I.V.(mL/kg) 510.9(7.7)   510.9(7.7)   Shift Total(mL/kg) 510.9(7.7)   510.9(7.7)   OUTPUT   Shift Total(mL/kg)       Weight (kg) 66.7 66.7 66.7 66.7     Lines/Drains/Airways     Peripheral Intravenous Line                 Peripheral IV - Single Lumen 12/24/21 1929 20 G Right Antecubital <1 day         Peripheral IV - Single Lumen 12/25/21 0542 18 G Left Antecubital <1 day                Physical Exam  NAD, conversant  EOMi, PERRL   OC/OP wnl, fresh blood in oropharynx, BOT smooth, FOM soft  Post-radiation fibrosis of the neck  No overlying skin changes or ecchymosis  Aerating well on room  air  Intermittent coughing of blood tinged saliva while in the room    Significant Labs:  CBC:   Recent Labs   Lab 12/25/21  0915   WBC 9.55   RBC 2.74*   HGB 7.9*   HCT 23.8*      MCV 87   MCH 28.8   MCHC 33.2     CMP:   Recent Labs   Lab 12/24/21  1930   *   CALCIUM 8.8   ALBUMIN 3.3*   PROT 6.0   *   K 3.9   CO2 23      BUN 29*   CREATININE 1.0   ALKPHOS 72   ALT 13   AST 14   BILITOT 1.0       Significant Diagnostics:  I have reviewed and interpreted all pertinent imaging results/findings within the past 24 hours.

## 2021-12-25 NOTE — ED TRIAGE NOTES
Cyrus Ryanphil Herbert, a 71 y.o. male presents to the ED w/ complaint of    Triage note:  Chief Complaint   Patient presents with    [place] Transfer     Pt arrives as transfer from [place] for ENT. Hx of laryngeal cancer and having hematemesis. Scheduled to have throat mass removed on 1/6     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

## 2021-12-25 NOTE — SUBJECTIVE & OBJECTIVE
Medications:  Continuous Infusions:  Scheduled Meds:   tranexamic acid  500 mg Nebulization ED 1 Time     PRN Meds:     Current Facility-Administered Medications on File Prior to Encounter   Medication    [COMPLETED] iohexoL (OMNIPAQUE 350) injection 100 mL    lactated ringers infusion     Current Outpatient Medications on File Prior to Encounter   Medication Sig    acetaminophen (TYLENOL) 500 MG tablet Take 1 tablet (500 mg total) by mouth every 6 (six) hours as needed for Pain.    apixaban (ELIQUIS) 5 mg Tab Take 1 tablet (5 mg total) by mouth 2 (two) times daily.    aspirin (ECOTRIN) 81 MG EC tablet Take 1 tablet by mouth Daily.    benzonatate (TESSALON) 200 MG capsule Take 1 capsule (200 mg total) by mouth 3 (three) times daily as needed for Cough.    blood sugar diagnostic (BLOOD GLUCOSE TEST) Strp 1 strip by Misc.(Non-Drug; Combo Route) route 2 (two) times daily before meals.    ciclopirox (LOPROX) 0.77 % Crea Apply topically 2 (two) times daily.    cyanocobalamin 500 MCG tablet Take 500 mcg by mouth once daily.    diltiaZEM (CARDIZEM CD) 120 MG Cp24 Take 1 capsule (120 mg total) by mouth once daily.    ergocalciferol (ERGOCALCIFEROL) 50,000 unit Cap TAKE 1 CAPSULE TWICE WEEKLY (Patient taking differently: Take 50,000 Units by mouth every 7 days. MONDAYS & THURSDAYS)    esomeprazole (NEXIUM) 20 MG capsule Take 20 mg by mouth every morning.    fluticasone (FLONASE) 50 mcg/actuation nasal spray USE 2 SPRAYS TO EACH NOSTRIL ONCE A DAY (Patient taking differently: 2 sprays by Each Nostril route every evening. USE 2 SPRAYS TO EACH NOSTRIL ONCE A DAY)    HYDROcodone-acetaminophen (NORCO) 5-325 mg per tablet Take 1 tablet by mouth every 6 (six) hours as needed for Pain.    ibuprofen (ADVIL,MOTRIN) 400 MG tablet Take 1 tablet (400 mg total) by mouth every 6 (six) hours as needed for Other (pain).    losartan (COZAAR) 100 MG tablet TAKE 1 TABLET BY MOUTH EVERY DAY (Patient taking differently: Take 100  "mg by mouth every evening.)    pen needle, diabetic (BD ULTRA-FINE YULISSA PEN NEEDLE) 32 gauge x 5/32" Ndle Use once weekly.    SHINGRIX, PF, 50 mcg/0.5 mL injection TO BE ADMINISTERED BY PHARMACIST FOR IMMUNIZATION       Review of patient's allergies indicates:   Allergen Reactions    Pollen extracts     Lovastatin Rash     Not confirmed but pt skeptical       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     Past Surgical History:   Procedure Laterality Date    LARYNGOSCOPY N/A 8/4/2020    Procedure: Suspension microlaryngoscopy with biopsy, possible KTP laser treatment/excision;  Surgeon: Stew Noel MD;  Location: [facility] OR [address] [place] [address];  Service: ENT;  Laterality: N/A;  Microscope, telescopes, tower, microinstruments, KTP laser, rep conf# [id number] IC 7/28.    LARYNGOSCOPY N/A 3/16/2021    Procedure: Suspension microlaryngoscopy with excision of lesion, possible CO2 laser;  Surgeon: Stew Noel MD;  Location: [facility] OR [address] [place] [address];  Service: ENT;  Laterality: N/A;  Microscope, telescopes, tower, microinstruments, CO2 laser, rep conf# [id number] IC 3/4.    LARYNGOSCOPY N/A 4/1/2021    Procedure: Suspension microlaryngoscopy with KTP laser excision of lesion;  Surgeon: Stew Noel MD;  [facility];  Service: ENT;  Laterality: N/A;  Microscope, telescopes, tower, microinstruments, 70 degree scope, vocal fold , KTP laser, rep conf# [id number] BC    LARYNGOSCOPY N/A 12/9/2021    Procedure: Suspension microlaryngoscopy with biopsy;  Surgeon: Stew Noel MD;  [facility];  Service: ENT;  Laterality: N/A;  Microscope, telescopes, tower, microinstruments    MICROLARYNGOSCOPY N/A 3/17/2020    Procedure: MICROLARYNGOSCOPY;  Surgeon: Jung Xiao MD;  Location: [facility] OR;  Service: ENT;  Laterality: " N/A;  Laser Microlaryngoscopy  NEED TO SCHEDULE LASER from [facility] [id number]     Family History     Problem Relation (Age of Onset)    Abnormal EKG Mother    Diabetes Father    Heart disease Father    Hypertension Father        Tobacco Use    Smoking status: Never Smoker    Smokeless tobacco: Never Used   Substance and Sexual Activity    Alcohol use: Not Currently     Comment: occasional    Drug use: No    Sexual activity: Yes     Partners: Female     Review of Systems  Objective:     Vital Signs (Most Recent):  Temp: 99.4 °F (37.4 °C) (12/25/21 0121)  Pulse: 104 (12/25/21 0230)  Resp: 19 (12/25/21 0230)  BP: (!) 146/94 (12/25/21 0230)  SpO2: 97 % (12/25/21 0230) Vital Signs (24h Range):  Temp:  [99.2 °F (37.3 °C)-99.4 °F (37.4 °C)] 99.4 °F (37.4 °C)  Pulse:  [103-118] 104  Resp:  [16-24] 19  SpO2:  [97 %-100 %] 97 %  BP: (117-146)/(60-94) 146/94     Weight: 66.7 kg (147 lb)  Body mass index is 23.73 kg/m².      Physical Exam  NAD, conversant  EOMi, PERRL   OC/OP wnl, fresh blood in oropharynx, BOT smooth, FOM soft  Post-radiation fibrosis of the neck  No overlying skin changes or ecchymosis  Aerating well on room air  Intermittent coughing of blood tinged saliva while in the room    Flexible laryngoscopy  Friable glottic mass extending into the subglottis     Significant Labs:  CBC:   Recent Labs   Lab 12/24/21 1930   WBC 8.65   RBC 2.96*   HGB 8.6*   HCT 25.9*      MCV 88   MCH 29.1   MCHC 33.2     CMP:   Recent Labs   Lab 12/24/21 1930   *   CALCIUM 8.8   ALBUMIN 3.3*   PROT 6.0   *   K 3.9   CO2 23      BUN 29*   CREATININE 1.0   ALKPHOS 72   ALT 13   AST 14   BILITOT 1.0       Significant Diagnostics:  CT: I have reviewed all pertinent results/findings within the past 24 hours and my personal findings are:  see lucita

## 2021-12-25 NOTE — ASSESSMENT & PLAN NOTE
- Admit to ICU for airway observation  - Nebulized TXA q4h  - Continue to hold home eliquis for now, ok with subq heparin  - Home meds restarted otherwise  - Intubatable with a a 6-0 ETT should bleeding begin to compromise his airway  - NPO, IVmF  - q12h H/H, Daily labs  - Discussed the possibility of moving up the date of his laryngectomy, possibly Thursday  - Continue ICU care  - Please call or page ENT with any concerns

## 2021-12-25 NOTE — ASSESSMENT & PLAN NOTE
- Admit to ICU for airway observation  - Nebulized TXA prn for hemoptysis  - Continue to hold home eliquis for now  - Home meds restarted otherwise  - Intubatable with a a 6-0 ETT should bleeding begin to compromise his airway  - NPO, IVmF  - q12h H/H, Daily labs  - Discussed the possibility of moving up the date of his laryngectomy to account for this change  - Please call or page ENT with any concerns

## 2021-12-25 NOTE — SUBJECTIVE & OBJECTIVE
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    LARYNGOSCOPY N/A 3/16/2021    Procedure: Suspension microlaryngoscopy with excision of lesion, possible CO2 laser;  Surgeon: Stew Noel MD;  Location: [address];  Service: ENT;  Laterality: N/A;  Microscope, telescopes, tower, microinstruments, CO2 laser, rep conf# [id number] IC 3/4.    LARYNGOSCOPY N/A 4/1/2021    Procedure: Suspension microlaryngoscopy with KTP laser excision of lesion;  Surgeon: Stew Noel MD;  Location: [address];  Service: ENT;  Laterality: N/A;  Microscope, telescopes, tower, microinstruments, 70 degree scope, vocal fold , KTP laser, rep conf# [id number] BC    LARYNGOSCOPY N/A 12/9/2021    Procedure: Suspension microlaryngoscopy with biopsy;  Surgeon: Stew Noel MD;  Location: [address];  Service: ENT;  Laterality: N/A;  Microscope, telescopes, tower, microinstruments    MICROLARYNGOSCOPY N/A 3/17/2020    Procedure: MICROLARYNGOSCOPY;  Surgeon: Jung Xiao MD;  [facility]: [facility];  Service: ENT;  Laterality: N/A;  Laser Microlaryngoscopy  [id number]       Review of patient's allergies indicates:   Allergen Reactions    Pollen extracts     Lovastatin Rash     Not confirmed but pt skeptical       Family History     Problem Relation (Age of Onset)    Abnormal EKG Mother    Diabetes Father    Heart disease Father     Hypertension Father        Tobacco Use    Smoking status: Never Smoker    Smokeless tobacco: Never Used   Substance and Sexual Activity    Alcohol use: Not Currently     Comment: occasional    Drug use: No    Sexual activity: Yes     Partners: Female      Review of Systems   Constitutional: Negative for chills, diaphoresis and fever.   HENT: Negative for ear pain, hearing loss, postnasal drip, rhinorrhea, sore throat, tinnitus and trouble swallowing.    Eyes: Negative for visual disturbance.   Respiratory: Positive for cough (hemoptysis). Negative for chest tightness, shortness of breath, wheezing and stridor.    Cardiovascular: Negative for chest pain.   Gastrointestinal: Negative for abdominal distention, abdominal pain, diarrhea, nausea and vomiting.   Endocrine: Negative for cold intolerance and heat intolerance.   Genitourinary: Negative for difficulty urinating and dysuria.   Musculoskeletal: Negative for arthralgias and myalgias.   Skin: Negative for rash.   Neurological: Positive for headaches (mild). Negative for weakness.   Hematological: Does not bruise/bleed easily.   Psychiatric/Behavioral: Negative for confusion.     Objective:     Vital Signs (Most Recent):  Temp: 99.4 °F (37.4 °C) (12/25/21 0121)  Pulse: 104 (12/25/21 0530)  Resp: 19 (12/25/21 0530)  BP: (!) 148/76 (12/25/21 0530)  SpO2: 97 % (12/25/21 0530) Vital Signs (24h Range):  Temp:  [99.2 °F (37.3 °C)-99.4 °F (37.4 °C)] 99.4 °F (37.4 °C)  Pulse:  [101-118] 104  Resp:  [16-36] 19  SpO2:  [97 %-100 %] 97 %  BP: (117-148)/(60-94) 148/76     Weight: 66.7 kg (147 lb)  Body mass index is 23.73 kg/m².    No intake or output data in the 24 hours ending 12/25/21 0553    Physical Exam  Constitutional:       Appearance: Normal appearance. He is well-developed and well-nourished. He is not diaphoretic.   HENT:      Head: Normocephalic and atraumatic.      Right Ear: Hearing normal.      Left Ear: Hearing normal.      Mouth/Throat:      Lips: Pink.       Mouth: Mucous membranes are moist.      Comments: Oropharynx clear  Eyes:      General: Lids are normal.      Extraocular Movements: EOM normal.      Conjunctiva/sclera: Conjunctivae normal.   Neck:      Thyroid: No thyroid mass or thyromegaly.      Vascular: No carotid bruit or JVD.      Trachea: Trachea and phonation normal. No tracheal tenderness or tracheal deviation.      Comments: Post-radiation fibrosis of the neck  Cardiovascular:      Rate and Rhythm: Normal rate and regular rhythm.      Pulses: Normal pulses.           Radial pulses are 2+ on the right side and 2+ on the left side.      Heart sounds: Normal heart sounds, S1 normal and S2 normal. No murmur heard.  No friction rub. No gallop. No S3 or S4 sounds.    Pulmonary:      Effort: Pulmonary effort is normal. No respiratory distress.      Breath sounds: Normal breath sounds. No stridor. No decreased breath sounds, wheezing, rhonchi or rales.   Abdominal:      General: Bowel sounds are normal.      Palpations: Abdomen is soft.      Tenderness: There is no abdominal tenderness.   Musculoskeletal:         General: Normal range of motion.      Cervical back: Full passive range of motion without pain.   Lymphadenopathy:      Cervical: No cervical adenopathy.   Skin:     General: Skin is warm, dry and intact.      Coloration: Skin is not pale.      Findings: No rash.   Neurological:      Mental Status: He is alert and oriented to person, place, and time.      Deep Tendon Reflexes: Strength normal.   Psychiatric:         Attention and Perception: He is attentive.         Mood and Affect: Mood and affect normal.         Speech: Speech normal.         Behavior: Behavior normal.         Thought Content: Thought content normal.         Cognition and Memory: Cognition and memory normal.         Judgment: Judgment normal.         Vents:       Lines/Drains/Airways     Peripheral Intravenous Line                 Peripheral IV - Single Lumen 12/24/21 1929 20 G  Right Antecubital <1 day         Peripheral IV - Single Lumen 12/25/21 0542 18 G Left Antecubital <1 day                Significant Labs:    CBC/Anemia Profile:  Recent Labs   Lab 12/24/21 1930   WBC 8.65   HGB 8.6*   HCT 25.9*      MCV 88   RDW 12.0        Chemistries:  Recent Labs   Lab 12/24/21 1930   *   K 3.9      CO2 23   BUN 29*   CREATININE 1.0   CALCIUM 8.8   ALBUMIN 3.3*   PROT 6.0   BILITOT 1.0   ALKPHOS 72   ALT 13   AST 14   MG 1.6       All pertinent labs within the past 24 hours have been reviewed.    Significant Imaging: I have reviewed all pertinent imaging results/findings within the past 24 hours.

## 2021-12-25 NOTE — CONSULTS
[facility] - Emergency Dept  Otorhinolaryngology-Head & Neck Surgery  Consult Note    Patient Name: Cyrus Batres Jr.  MRN: [id number]  Code Status: Prior  Admission Date: 12/25/2021  Hospital Length of Stay: 0 days  Attending Physician: Diana Manrique MD  Primary Care Provider: Diana Calhoun MD    Patient information was obtained from parent and ER records.     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 Patient states that previously it was primarily at night while he was lying down but has progressed to constantly throughout the day. He is coughing up ~ 1 tbsp of blood per hour. Denies any subjective dyspnea. He is scheduled to have a laryngectomy on 1/6/22 with Dr James. On arrival to the ED he was hemodynamically stable. Hgb 8.6. CTA chest and CT neck did not reveal any evidence of active bleeding or vascular fistulas. ENT consulted to evaluate on his arrival from [facility].       Medications:  Continuous Infusions:  Scheduled Meds:   tranexamic acid  500 mg Nebulization ED 1 Time     PRN Meds:     Current Facility-Administered Medications on File Prior to Encounter   Medication    [COMPLETED] iohexoL (OMNIPAQUE 350) injection 100 mL    lactated ringers infusion     Current Outpatient Medications on File Prior to Encounter   Medication Sig    acetaminophen (TYLENOL) 500 MG tablet Take 1 tablet (500 mg total) by mouth every 6 (six) hours as needed for Pain.    apixaban (ELIQUIS) 5 mg Tab Take 1 tablet (5 mg total) by mouth 2 (two) times daily.    aspirin (ECOTRIN) 81 MG EC tablet Take 1 tablet by mouth Daily.    benzonatate (TESSALON) 200 MG capsule Take 1  "capsule (200 mg total) by mouth 3 (three) times daily as needed for Cough.    blood sugar diagnostic (BLOOD GLUCOSE TEST) Strp 1 strip by Misc.(Non-Drug; Combo Route) route 2 (two) times daily before meals.    ciclopirox (LOPROX) 0.77 % Crea Apply topically 2 (two) times daily.    cyanocobalamin 500 MCG tablet Take 500 mcg by mouth once daily.    diltiaZEM (CARDIZEM CD) 120 MG Cp24 Take 1 capsule (120 mg total) by mouth once daily.    ergocalciferol (ERGOCALCIFEROL) 50,000 unit Cap TAKE 1 CAPSULE TWICE WEEKLY (Patient taking differently: Take 50,000 Units by mouth every 7 days. MONDAYS & THURSDAYS)    esomeprazole (NEXIUM) 20 MG capsule Take 20 mg by mouth every morning.    fluticasone (FLONASE) 50 mcg/actuation nasal spray USE 2 SPRAYS TO EACH NOSTRIL ONCE A DAY (Patient taking differently: 2 sprays by Each Nostril route every evening. USE 2 SPRAYS TO EACH NOSTRIL ONCE A DAY)    HYDROcodone-acetaminophen (NORCO) 5-325 mg per tablet Take 1 tablet by mouth every 6 (six) hours as needed for Pain.    ibuprofen (ADVIL,MOTRIN) 400 MG tablet Take 1 tablet (400 mg total) by mouth every 6 (six) hours as needed for Other (pain).    losartan (COZAAR) 100 MG tablet TAKE 1 TABLET BY MOUTH EVERY DAY (Patient taking differently: Take 100 mg by mouth every evening.)    pen needle, diabetic (BD ULTRA-FINE YULISSA PEN NEEDLE) 32 gauge x 5/32" Ndle Use once weekly.   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    LARYNGOSCOPY N/A 3/16/2021    Procedure: Suspension microlaryngoscopy with excision of lesion, possible CO2 laser;  Surgeon: Stew Noel MD;  Location: [facility];  Service: ENT;  Laterality: N/A;  Microscope, telescopes, tower, microinstruments, CO2 laser, rep conf# [id number] IC 3/4.    LARYNGOSCOPY N/A 4/1/2021    Procedure: Suspension microlaryngoscopy with KTP laser excision of lesion;  Surgeon: Stew Noel MD;  Location: [facility];  Service: ENT;  Laterality: N/A;  Microscope, telescopes, tower, microinstruments, 70 degree scope, vocal fold , KTP laser, rep conf# [id number] BC    LARYNGOSCOPY N/A 12/9/2021    Procedure: Suspension microlaryngoscopy with biopsy;  Surgeon: Stew Noel MD;  [facility];  Service: ENT;  Laterality: N/A;  Microscope, telescopes, tower, microinstruments    MICROLARYNGOSCOPY N/A 3/17/2020    Procedure: MICROLARYNGOSCOPY;  Surgeon: Jung Xiao MD;  Location: [facility] OR;  Service: ENT;  Laterality: N/A;  Laser Microlaryngoscopy  NEED TO SCHEDULE LASER from [facility] [id number]     Family History     Problem Relation (Age of Onset)    Abnormal EKG Mother    Diabetes Father    Heart disease Father    Hypertension Father        Tobacco Use    Smoking status: Never Smoker    Smokeless tobacco: Never Used   Substance and Sexual Activity    Alcohol use: Not Currently     Comment: occasional    Drug use: No    Sexual activity: Yes     Partners: Female     Review of Systems  Objective:     Vital Signs (Most Recent):  Temp: 99.4 °F (37.4 °C) (12/25/21 0121)  Pulse: 104 (12/25/21 0230)  Resp: 19 (12/25/21 0230)  BP: (!) 146/94 (12/25/21 0230)  SpO2: 97 % (12/25/21 0230) Vital  Signs (24h Range):  Temp:  [99.2 °F (37.3 °C)-99.4 °F (37.4 °C)] 99.4 °F (37.4 °C)  Pulse:  [103-118] 104  Resp:  [16-24] 19  SpO2:  [97 %-100 %] 97 %  BP: (117-146)/(60-94) 146/94     Weight: 66.7 kg (147 lb)  Body mass index is 23.73 kg/m².      Physical Exam  NAD, conversant  EOMi, PERRL   OC/OP wnl, fresh blood in oropharynx, BOT smooth, FOM soft  Post-radiation fibrosis of the neck  No overlying skin changes or ecchymosis  Aerating well on room air  Intermittent coughing of blood tinged saliva while in the room    Flexible laryngoscopy  Friable glottic mass extending into the subglottis     Significant Labs:  CBC:   Recent Labs   Lab 12/24/21 1930   WBC 8.65   RBC 2.96*   HGB 8.6*   HCT 25.9*      MCV 88   MCH 29.1   MCHC 33.2     CMP:   Recent Labs   Lab 12/24/21 1930   *   CALCIUM 8.8   ALBUMIN 3.3*   PROT 6.0   *   K 3.9   CO2 23      BUN 29*   CREATININE 1.0   ALKPHOS 72   ALT 13   AST 14   BILITOT 1.0       Significant Diagnostics:  CT: I have reviewed all pertinent results/findings within the past 24 hours and my personal findings are:  see lucita    Assessment/Plan:     Hemoptysis    - Admit to ICU for airway observation  - Nebulized TXA prn for hemoptysis  - Continue to hold home eliquis for now  - Home meds restarted otherwise  - Intubatable with a a 6-0 ETT should bleeding begin to compromise his airway  - NPO, IVmF  - q12h H/H, Daily labs  - Discussed the possibility of moving up the date of his laryngectomy to account for this change  - Please call or page ENT with any concerns      VTE Risk Mitigation (From admission, onward)    None          Thank you for your consult. I will follow-up with patient. Please contact us if you have any additional questions.    Mitesh Tang MD  Otorhinolaryngology-Head & Neck Surgery  [facility] - Emergency Dept

## 2021-12-25 NOTE — ASSESSMENT & PLAN NOTE
70yo M w h/o Afib, HTN, DM (diet controlled), and laryngeal ca. Presents w hemoptysis. ENT admits to SICU for airway watch.      Neuro/Psych:   -- Sedation: None   -- Pain: Tylenol. Takes norco at home. If requiring pain medications, consider oxy 5 PRN to start             Cards:   -- HDS  -- Maintain MAP > 65  -- Okay to restart home dilt  -- Will hold on home losartan given possibility of intubation w sedation and change in when patient may go to OR      Pulm:   -- Goal O2 sat > 90%  -- Nebulized TXA prn for hemoptysis  -- Intubatable with a a 6-0 ETT should bleeding begin to compromise his airway. Call anesthesia for intubation as tumor increases complexity of intubation. Consider spontaneous breathing fiberoptic intubation      Renal:  -- Will hold off on mahan for now. Should clinical condition worsen, will place for strict I/Os  -- BUN/Cr baseline      FEN / GI:   -- Replace lytes as needed  -- Nutrition: NPO  -- mIVF while NPO      ID:   -- Tm: afebrile; WBC wnl  -- Abx none      Heme/Onc:   -- H/H stable  -- q12 CBC  -- Transfuse to maintain hgb >7  -- Continue to hold home eliquis for now  -- Will consider starting sqh      Endo:   -- Gluc goal 140-180  -- ISS      PPx:   Feeding: NPO  Analgesia/Sedation: tylenol / none  Thromboembolic prevention: consider sqh  HOB >30: y  Stress Ulcer ppx: famotidine  Glucose control: Critical care goal 140-180 g/dl, ISS    Lines/Drains/Airway: PIV x2      Dispo/Code Status/Palliative:   -- SICU / Full Code

## 2021-12-25 NOTE — PROGRESS NOTES
[facility] - Surgical Intensive Care  Otorhinolaryngology-Head & Neck Surgery  Progress Note    Subjective:     Post-Op Info:  * No surgery found *      Hospital Day: 1     Interval History: Continued hemoptysis overnight. 2 tbsp per hour. Has not received repeat neb TXA which he felt helped. Small drop in Hgb to 7.8.     Medications:  Continuous Infusions:   lactated ringers 100 mL/hr at 12/25/21 1200     Scheduled Meds:   diltiaZEM  120 mg Oral Daily    enoxaparin  40 mg Subcutaneous Daily    famotidine (PF)  20 mg Intravenous Q12H    mupirocin   Nasal BID    QUEtiapine  25 mg Oral QHS     PRN Meds:calcium gluconate IVPB, calcium gluconate IVPB, calcium gluconate IVPB, dextrose 50%, glucagon (human recombinant), insulin aspart U-100, magnesium oxide, magnesium oxide, magnesium sulfate IVPB, magnesium sulfate IVPB, potassium bicarbonate, potassium bicarbonate, potassium bicarbonate, potassium chloride in water **AND** potassium chloride in water **AND** potassium chloride in water, sodium chloride 0.9%, sodium chloride 0.9%, tranexamic acid     Review of patient's allergies indicates:   Allergen Reactions    Pollen extracts     Lovastatin Rash     Not confirmed but pt skeptical     Objective:     Vital Signs (24h Range):  Temp:  [98.1 °F (36.7 °C)-99.4 °F (37.4 °C)] 98.1 °F (36.7 °C)  Pulse:  [] 101  Resp:  [16-36] 23  SpO2:  [95 %-100 %] 95 %  BP: (117-148)/(60-94) 148/66     Date 12/25/21 0700 - 12/26/21 0659   Shift 5486-7106 7631-6897 0494-0926 24 Hour Total   INTAKE   I.V.(mL/kg) 510.9(7.7)   510.9(7.7)   Shift Total(mL/kg) 510.9(7.7)   510.9(7.7)   OUTPUT   Shift Total(mL/kg)       Weight (kg) 66.7 66.7 66.7 66.7     Lines/Drains/Airways     Peripheral Intravenous Line                 Peripheral IV - Single Lumen 12/24/21 1929 20 G Right Antecubital <1 day         Peripheral IV - Single Lumen 12/25/21 0542 18 G Left Antecubital <1 day                Physical Exam  NAD, conversant  EOMi, PERRL    OC/OP wnl, fresh blood in oropharynx, BOT smooth, FOM soft  Post-radiation fibrosis of the neck  No overlying skin changes or ecchymosis  Aerating well on room air  Intermittent coughing of blood tinged saliva while in the room    Significant Labs:  CBC:   Recent Labs   Lab 12/25/21  0915   WBC 9.55   RBC 2.74*   HGB 7.9*   HCT 23.8*      MCV 87   MCH 28.8   MCHC 33.2     CMP:   Recent Labs   Lab 12/24/21  1930   *   CALCIUM 8.8   ALBUMIN 3.3*   PROT 6.0   *   K 3.9   CO2 23      BUN 29*   CREATININE 1.0   ALKPHOS 72   ALT 13   AST 14   BILITOT 1.0       Significant Diagnostics:  I have reviewed and interpreted all pertinent imaging results/findings within the past 24 hours.    Assessment/Plan:     Hemoptysis    - Admit to ICU for airway observation  - Nebulized TXA q4h  - Continue to hold home eliquis for now, ok with subq heparin  - Home meds restarted otherwise  - Intubatable with a a 6-0 ETT should bleeding begin to compromise his airway  - NPO, IVmF  - q12h H/H, Daily labs  - Discussed the possibility of moving up the date of his laryngectomy, possibly Thursday  - Continue ICU care  - Please call or page ENT with any concerns        Mitesh Tang MD  Otorhinolaryngology-Head & Neck Surgery  [facility] - Surgical Intensive Care

## 2021-12-25 NOTE — HPI
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 Patient states that previously it was primarily at night while he was lying down but has progressed to constantly throughout the day. He is coughing up ~ 1 tbsp of blood per hour. Denies any subjective dyspnea. He is scheduled to have a laryngectomy on 1/6/22 with Dr James. On arrival to the ED he was hemodynamically stable. Hgb 8.6. CTA chest and CT neck did not reveal any evidence of active bleeding or vascular fistulas. ENT consulted to evaluate on his arrival from [facility].

## 2021-12-25 NOTE — ED PROVIDER NOTES
Encounter Date: 12/25/2021       History     Chief Complaint   Patient presents with    [place] Transfer     Pt arrives as transfer from [place] for ENT. Hx of laryngeal cancer and having hematemesis. Scheduled to have throat mass removed on 1/6     Patient is a 71-year-old male with history of AFib, laryngeal carcinoma, diabetes, hypertension who presented as a transfer from [place] for ENT evaluation.  Patient initially presented to the emergency department for worsening hemoptysis.  Patient states that started a week ago and has gotten progressively worse.  Initially was just little specks of blood and now it is approximately 6 oz every few hours.  Patient denies any difficulty breathing, fevers, nausea, vomiting at this time.  Patient denies any chest pain or shortness of breath.  Patient was accepted by ENT to be evaluated.        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     Past Surgical History:   Procedure Laterality Date    LARYNGOSCOPY N/A 8/4/2020    Procedure: Suspension microlaryngoscopy with biopsy, possible KTP laser treatment/excision;  Surgeon: Stew Noel MD;  Location: [facility] OR [address] [place] [address];  Service: ENT;  Laterality: N/A;  Microscope, telescopes, tower, microinstruments, KTP laser, rep conf# [id number] IC 7/28.    LARYNGOSCOPY N/A 3/16/2021    Procedure: Suspension microlaryngoscopy with excision of lesion, possible CO2 laser;  Surgeon: Stew Noel MD;  Location: [facility] OR [address] [place] [address];  Service: ENT;  Laterality: N/A;  Microscope, telescopes, tower, microinstruments, CO2 laser, rep conf# [id number] IC 3/4.    LARYNGOSCOPY N/A 4/1/2021    Procedure: Suspension microlaryngoscopy with  KTP laser excision of lesion;  Surgeon: Stew Noel MD;  Location: [facility] OR 2ND FLR;  Service: ENT;  Laterality: N/A;  Microscope, telescopes, tower, microinstruments, 70 degree scope, vocal fold , KTP laser, rep conf# [id number] BC    LARYNGOSCOPY N/A 12/9/2021    Procedure: Suspension microlaryngoscopy with biopsy;  Surgeon: Stew Noel MD;  Location: [facility] OR 2ND FLR;  Service: ENT;  Laterality: N/A;  Microscope, telescopes, tower, microinstruments    MICROLARYNGOSCOPY N/A 3/17/2020    Procedure: MICROLARYNGOSCOPY;  Surgeon: Jung Xiao MD;  Location: [facility] OR;  Service: ENT;  Laterality: N/A;  Laser Microlaryngoscopy  [phone]     Family History   Problem Relation Age of Onset    Abnormal EKG Mother     Diabetes Father     Heart disease Father     Hypertension Father      Social History     Tobacco Use    Smoking status: Never Smoker    Smokeless tobacco: Never Used   Substance Use Topics    Alcohol use: Not Currently     Comment: occasional    Drug use: No     Review of Systems   Constitutional: Negative for fever.   HENT: Negative for sore throat.         Hemoptysis   Respiratory: Negative for shortness of breath.    Cardiovascular: Negative for chest pain.   Gastrointestinal: Negative for nausea.   Genitourinary: Negative for dysuria.   Musculoskeletal: Negative for back pain.   Skin: Negative for rash.   Neurological: Negative for weakness.   Hematological: Does not bruise/bleed easily.       Physical Exam     Initial Vitals [12/25/21 0121]   BP Pulse Resp Temp SpO2   (!) 146/81 107 16 99.4 °F (37.4 °C) 98 %      MAP       --         Physical Exam    Constitutional: He appears well-developed and well-nourished.   HENT:   Head: Normocephalic and atraumatic.   Eyes: EOM are normal. Pupils are equal, round, and reactive to light.   Neck: Neck supple. No JVD present.   Normal range of motion.  Cardiovascular: Normal rate, regular  rhythm, normal heart sounds and intact distal pulses.   Pulmonary/Chest: Breath sounds normal.   No stridor   Abdominal: Abdomen is soft. Bowel sounds are normal.   Musculoskeletal:         General: Normal range of motion.      Cervical back: Normal range of motion and neck supple.     Lymphadenopathy:     He has no cervical adenopathy.   Neurological: He is alert and oriented to person, place, and time. He has normal strength and normal reflexes. GCS score is 15. GCS eye subscore is 4. GCS verbal subscore is 5. GCS motor subscore is 6.   Skin: Skin is warm and dry. Capillary refill takes less than 2 seconds.         ED Course   Procedures  Labs Reviewed - No data to display       Imaging Results    None          Medications   tranexamic acid nebulizer Soln 500 mg (has no administration in time range)     Medical Decision Making:   Initial Assessment:   Patient is a 71-year-old male who presented as a transfer from [facility] our ENT.  Patient is alert and oriented in no acute distress.  Patient vitals are within normal limits.  Patient is afebrile.  Differential Diagnosis:   Hemoptysis secondary to cancer  ED Management:  Patient transferred and evaluated by ENT who recommended patient be given nebulized TXA.  Recommended patient be admitted to Medical ICU for airway watch pending laryngectomy. ENT also stated that patient is safe to be intubate and would require a 6 0 tube.            Attending Attestation:   Physician Attestation Statement for Resident:  As the supervising MD   Physician Attestation Statement: I have personally seen and examined this patient.   I agree with the above history. -:   As the supervising MD I agree with the above PE.    As the supervising MD I agree with the above treatment, course, plan, and disposition.  I have reviewed the following: records from a referring facility.                         Clinical Impression:   Final diagnoses:  [R04.2] Hemoptysis (Primary)          ED Disposition  Condition    Admit               Charles Harrington MD  Resident  12/25/21 0249       Diana Manrique MD  12/25/21 0257

## 2021-12-25 NOTE — HPI
Cyrus Batres Jr.  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  Patient was recently admitted to the SICU and is s/p trach/PEG on 12/27.  Patient re-presented to the ED with hemoptysis.  Patient was evaluated by ENT and was scheduled for TL, BND, Total thyroidectomy, and ALT free flap. Postoperatively, the patient is admitted to the SICU for further care.  He received 3L of crystalloid intraop as well as precedex, fentanyl and ketamine for pain.  He is HDS on no pressor support.  He is breathing spontaneously on trach collar.

## 2021-12-25 NOTE — NURSING
Pt arrived by stretcher transported by 1 nurse and on monitor. [facility] Team notified, Dr. СЕРГЕЙ Johnson at bedside to assess.

## 2021-12-25 NOTE — H&P
[facility] - Surgical Intensive Care  Critical Care - Surgery  History & Physical    Patient Name: Cyrus Batres Jr.  MRN: [id number]  Admission Date: 12/25/2021  Code Status: Full Code  Attending Physician: Flex Espinosa MD   Primary Care Provider: Diana Calhoun MD   Principal Problem: <principal problem not specified>    Subjective:     HPI:  Cyrus Batres Jr.  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  Patient initially presented to the emergency department for worsening hemoptysis over the last week.  Patient states that previously it was primarily at night while he was lying down but has progressed to constantly throughout the day. Initially was just little specks of blood and now it is approximately 6 oz every few hours. Patient denies any chest pain or shortness of breath. He is currently coughing up ~ 1 tbsp of blood per hour. He was scheduled to have a laryngectomy on 1/6/22 with Dr James, but this may get moved up now given current presentation. On arrival to the ED he was hemodynamically stable and remains stable on arrival to SICU. Hgb 8.6. CTA chest and CT neck did not reveal any evidence of active bleeding or vascular fistulas. He presents to SICU for airway watch.      Hospital/ICU Course:  No notes on file         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       Past Surgical History:   Procedure Laterality Date    LARYNGOSCOPY N/A 8/4/2020    Procedure: Suspension microlaryngoscopy with biopsy, possible KTP laser treatment/excision;  Surgeon: Stew Noel MD;  Location: [facility] OR [address] [place] [address];  Service: ENT;  Laterality: N/A;   Microscope, telescopes, tower, microinstruments, KTP laser, rep conf# [id number] IC 7/28.    LARYNGOSCOPY N/A 3/16/2021    Procedure: Suspension microlaryngoscopy with excision of lesion, possible CO2 laser;  Surgeon: Stew Noel MD;  [facility];  Service: ENT;  Laterality: N/A;  Microscope, telescopes, tower, microinstruments, CO2 laser, rep conf# [id number] IC 3/4.    LARYNGOSCOPY N/A 4/1/2021    Procedure: Suspension microlaryngoscopy with KTP laser excision of lesion;  Surgeon: Stew Noel MD;  [facility];  Service: ENT;  Laterality: N/A;  Microscope, telescopes, tower, microinstruments, 70 degree scope, vocal fold , KTP laser, rep conf# [id number] BC    LARYNGOSCOPY N/A 12/9/2021    Procedure: Suspension microlaryngoscopy with biopsy;  Surgeon: Stew Noel MD;  [facility];  Service: ENT;  Laterality: N/A;  Microscope, telescopes, tower, microinstruments    MICROLARYNGOSCOPY N/A 3/17/2020    Procedure: MICROLARYNGOSCOPY;  Surgeon: Jung Xiao MD;  [facility] OR;  Service: ENT;  Laterality: N/A;  Laser Microlaryngoscopy  NEED TO SCHEDULE LASER from [facility] [phone]       Review of patient's allergies indicates:   Allergen Reactions    Pollen extracts     Lovastatin Rash     Not confirmed but pt skeptical       Family History     Problem Relation (Age of Onset)    Abnormal EKG Mother    Diabetes Father    Heart disease Father    Hypertension Father        Tobacco Use    Smoking status: Never Smoker    Smokeless tobacco: Never Used   Substance and Sexual Activity    Alcohol use: Not Currently     Comment: occasional    Drug use: No    Sexual activity: Yes     Partners: Female      Review of Systems   Constitutional: Negative for chills, diaphoresis and fever.   HENT: Negative for ear pain, hearing loss, postnasal drip, rhinorrhea, sore throat, tinnitus and trouble swallowing.    Eyes: Negative for visual disturbance.    Respiratory: Positive for cough (hemoptysis). Negative for chest tightness, shortness of breath, wheezing and stridor.    Cardiovascular: Negative for chest pain.   Gastrointestinal: Negative for abdominal distention, abdominal pain, diarrhea, nausea and vomiting.   Endocrine: Negative for cold intolerance and heat intolerance.   Genitourinary: Negative for difficulty urinating and dysuria.   Musculoskeletal: Negative for arthralgias and myalgias.   Skin: Negative for rash.   Neurological: Positive for headaches (mild). Negative for weakness.   Hematological: Does not bruise/bleed easily.   Psychiatric/Behavioral: Negative for confusion.     Objective:     Vital Signs (Most Recent):  Temp: 99.4 °F (37.4 °C) (12/25/21 0121)  Pulse: 104 (12/25/21 0530)  Resp: 19 (12/25/21 0530)  BP: (!) 148/76 (12/25/21 0530)  SpO2: 97 % (12/25/21 0530) Vital Signs (24h Range):  Temp:  [99.2 °F (37.3 °C)-99.4 °F (37.4 °C)] 99.4 °F (37.4 °C)  Pulse:  [101-118] 104  Resp:  [16-36] 19  SpO2:  [97 %-100 %] 97 %  BP: (117-148)/(60-94) 148/76     Weight: 66.7 kg (147 lb)  Body mass index is 23.73 kg/m².    No intake or output data in the 24 hours ending 12/25/21 0553    Physical Exam  Constitutional:       Appearance: Normal appearance. He is well-developed and well-nourished. He is not diaphoretic.   HENT:      Head: Normocephalic and atraumatic.      Right Ear: Hearing normal.      Left Ear: Hearing normal.      Mouth/Throat:      Lips: Pink.      Mouth: Mucous membranes are moist.      Comments: Oropharynx clear  Eyes:      General: Lids are normal.      Extraocular Movements: EOM normal.      Conjunctiva/sclera: Conjunctivae normal.   Neck:      Thyroid: No thyroid mass or thyromegaly.      Vascular: No carotid bruit or JVD.      Trachea: Trachea and phonation normal. No tracheal tenderness or tracheal deviation.      Comments: Post-radiation fibrosis of the neck  Cardiovascular:      Rate and Rhythm: Normal rate and regular rhythm.       Pulses: Normal pulses.           Radial pulses are 2+ on the right side and 2+ on the left side.      Heart sounds: Normal heart sounds, S1 normal and S2 normal. No murmur heard.  No friction rub. No gallop. No S3 or S4 sounds.    Pulmonary:      Effort: Pulmonary effort is normal. No respiratory distress.      Breath sounds: Normal breath sounds. No stridor. No decreased breath sounds, wheezing, rhonchi or rales.   Abdominal:      General: Bowel sounds are normal.      Palpations: Abdomen is soft.      Tenderness: There is no abdominal tenderness.   Musculoskeletal:         General: Normal range of motion.      Cervical back: Full passive range of motion without pain.   Lymphadenopathy:      Cervical: No cervical adenopathy.   Skin:     General: Skin is warm, dry and intact.      Coloration: Skin is not pale.      Findings: No rash.   Neurological:      Mental Status: He is alert and oriented to person, place, and time.      Deep Tendon Reflexes: Strength normal.   Psychiatric:         Attention and Perception: He is attentive.         Mood and Affect: Mood and affect normal.         Speech: Speech normal.         Behavior: Behavior normal.         Thought Content: Thought content normal.         Cognition and Memory: Cognition and memory normal.         Judgment: Judgment normal.         Vents:       Lines/Drains/Airways     Peripheral Intravenous Line                 Peripheral IV - Single Lumen 12/24/21 1929 20 G Right Antecubital <1 day         Peripheral IV - Single Lumen 12/25/21 0542 18 G Left Antecubital <1 day                Significant Labs:    CBC/Anemia Profile:  Recent Labs   Lab 12/24/21 1930   WBC 8.65   HGB 8.6*   HCT 25.9*      MCV 88   RDW 12.0        Chemistries:  Recent Labs   Lab 12/24/21 1930   *   K 3.9      CO2 23   BUN 29*   CREATININE 1.0   CALCIUM 8.8   ALBUMIN 3.3*   PROT 6.0   BILITOT 1.0   ALKPHOS 72   ALT 13   AST 14   MG 1.6       All pertinent labs within  the past 24 hours have been reviewed.    Significant Imaging: I have reviewed all pertinent imaging results/findings within the past 24 hours.    Assessment/Plan:     Hemoptysis  72yo M w h/o Afib, HTN, DM (diet controlled), and laryngeal ca. Presents w hemoptysis. ENT admits to SICU for airway watch.      Neuro/Psych:   -- Sedation: None   -- Pain: Tylenol. Takes norco at home. If requiring pain medications, consider oxy 5 PRN to start             Cards:   -- HDS  -- Maintain MAP > 65  -- Okay to restart home dilt  -- Will hold on home losartan given possibility of intubation w sedation and change in when patient may go to OR      Pulm:   -- Goal O2 sat > 90%  -- Nebulized TXA prn for hemoptysis  -- Intubatable with a a 6-0 ETT should bleeding begin to compromise his airway. Call anesthesia for intubation as tumor increases complexity of intubation. Consider spontaneous breathing fiberoptic intubation      Renal:  -- Will hold off on mahan for now. Should clinical condition worsen, will place for strict I/Os  -- BUN/Cr baseline      FEN / GI:   -- Replace lytes as needed  -- Nutrition: NPO  -- mIVF while NPO      ID:   -- Tm: afebrile; WBC wnl  -- Abx none      Heme/Onc:   -- H/H stable  -- q12 CBC  -- Transfuse to maintain hgb >7  -- Continue to hold home eliquis for now  -- Will consider starting sqh      Endo:   -- Gluc goal 140-180  -- ISS      PPx:   Feeding: NPO  Analgesia/Sedation: tylenol / none  Thromboembolic prevention: consider sqh  HOB >30: y  Stress Ulcer ppx: famotidine  Glucose control: Critical care goal 140-180 g/dl, ISS    Lines/Drains/Airway: PIV x2      Dispo/Code Status/Palliative:   -- SICU / Full Code              Critical care was time spent personally by me on the following activities: development of treatment plan with patient or surrogate and bedside caregivers, discussions with consultants, evaluation of patient's response to treatment, examination of patient, ordering and  performing treatments and interventions, ordering and review of laboratory studies, ordering and review of radiographic studies, pulse oximetry, re-evaluation of patient's condition.  This critical care time did not overlap with that of any other provider or involve time for any procedures.     Jaskaran Johnson MD  Critical Care - Surgery  [facility] - Surgical Intensive Care

## 2021-12-26 ENCOUNTER — ANESTHESIA EVENT (OUTPATIENT)
Dept: SURGERY | Facility: HOSPITAL | Age: 71
DRG: 012 | End: 2021-12-26
Payer: MEDICARE

## 2021-12-26 LAB
ABO + RH BLD: NORMAL
ALBUMIN SERPL BCP-MCNC: 3 G/DL (ref 3.5–5.2)
ALP SERPL-CCNC: 79 U/L (ref 55–135)
ALT SERPL W/O P-5'-P-CCNC: 13 U/L (ref 10–44)
ANION GAP SERPL CALC-SCNC: 8 MMOL/L (ref 8–16)
AST SERPL-CCNC: 16 U/L (ref 10–40)
BASOPHILS # BLD AUTO: 0.02 K/UL (ref 0–0.2)
BASOPHILS # BLD AUTO: 0.03 K/UL (ref 0–0.2)
BASOPHILS NFR BLD: 0.3 % (ref 0–1.9)
BASOPHILS NFR BLD: 0.4 % (ref 0–1.9)
BILIRUB SERPL-MCNC: 1.2 MG/DL (ref 0.1–1)
BLD GP AB SCN CELLS X3 SERPL QL: NORMAL
BUN SERPL-MCNC: 26 MG/DL (ref 8–23)
CALCIUM SERPL-MCNC: 8.6 MG/DL (ref 8.7–10.5)
CHLORIDE SERPL-SCNC: 104 MMOL/L (ref 95–110)
CO2 SERPL-SCNC: 22 MMOL/L (ref 23–29)
CREAT SERPL-MCNC: 1.2 MG/DL (ref 0.5–1.4)
DIFFERENTIAL METHOD: ABNORMAL
DIFFERENTIAL METHOD: ABNORMAL
EOSINOPHIL # BLD AUTO: 0 K/UL (ref 0–0.5)
EOSINOPHIL # BLD AUTO: 0.1 K/UL (ref 0–0.5)
EOSINOPHIL NFR BLD: 0.1 % (ref 0–8)
EOSINOPHIL NFR BLD: 0.8 % (ref 0–8)
ERYTHROCYTE [DISTWIDTH] IN BLOOD BY AUTOMATED COUNT: 12 % (ref 11.5–14.5)
ERYTHROCYTE [DISTWIDTH] IN BLOOD BY AUTOMATED COUNT: 12.2 % (ref 11.5–14.5)
ERYTHROCYTE [DISTWIDTH] IN BLOOD BY AUTOMATED COUNT: 12.4 % (ref 11.5–14.5)
ERYTHROCYTE [DISTWIDTH] IN BLOOD BY AUTOMATED COUNT: 12.4 % (ref 11.5–14.5)
EST. GFR  (AFRICAN AMERICAN): >60 ML/MIN/1.73 M^2
EST. GFR  (NON AFRICAN AMERICAN): >60 ML/MIN/1.73 M^2
GLUCOSE SERPL-MCNC: 234 MG/DL (ref 70–110)
HCT VFR BLD AUTO: 17.1 % (ref 40–54)
HCT VFR BLD AUTO: 18.2 % (ref 40–54)
HCT VFR BLD AUTO: 20.9 % (ref 40–54)
HCT VFR BLD AUTO: 21.5 % (ref 40–54)
HGB BLD-MCNC: 5.8 G/DL (ref 14–18)
HGB BLD-MCNC: 5.9 G/DL (ref 14–18)
HGB BLD-MCNC: 7 G/DL (ref 14–18)
HGB BLD-MCNC: 7.2 G/DL (ref 14–18)
IMM GRANULOCYTES # BLD AUTO: 0.04 K/UL (ref 0–0.04)
IMM GRANULOCYTES # BLD AUTO: 0.05 K/UL (ref 0–0.04)
IMM GRANULOCYTES NFR BLD AUTO: 0.5 % (ref 0–0.5)
IMM GRANULOCYTES NFR BLD AUTO: 0.6 % (ref 0–0.5)
LYMPHOCYTES # BLD AUTO: 0.4 K/UL (ref 1–4.8)
LYMPHOCYTES # BLD AUTO: 0.6 K/UL (ref 1–4.8)
LYMPHOCYTES NFR BLD: 4.5 % (ref 18–48)
LYMPHOCYTES NFR BLD: 7.6 % (ref 18–48)
MAGNESIUM SERPL-MCNC: 1.7 MG/DL (ref 1.6–2.6)
MCH RBC QN AUTO: 28.5 PG (ref 27–31)
MCH RBC QN AUTO: 28.5 PG (ref 27–31)
MCH RBC QN AUTO: 28.7 PG (ref 27–31)
MCH RBC QN AUTO: 29 PG (ref 27–31)
MCHC RBC AUTO-ENTMCNC: 32.4 G/DL (ref 32–36)
MCHC RBC AUTO-ENTMCNC: 33.5 G/DL (ref 32–36)
MCHC RBC AUTO-ENTMCNC: 33.5 G/DL (ref 32–36)
MCHC RBC AUTO-ENTMCNC: 33.9 G/DL (ref 32–36)
MCV RBC AUTO: 85 FL (ref 82–98)
MCV RBC AUTO: 86 FL (ref 82–98)
MCV RBC AUTO: 86 FL (ref 82–98)
MCV RBC AUTO: 88 FL (ref 82–98)
MONOCYTES # BLD AUTO: 0.5 K/UL (ref 0.3–1)
MONOCYTES # BLD AUTO: 0.5 K/UL (ref 0.3–1)
MONOCYTES NFR BLD: 5.7 % (ref 4–15)
MONOCYTES NFR BLD: 6.5 % (ref 4–15)
NEUTROPHILS # BLD AUTO: 6.1 K/UL (ref 1.8–7.7)
NEUTROPHILS # BLD AUTO: 7.5 K/UL (ref 1.8–7.7)
NEUTROPHILS NFR BLD: 84.3 % (ref 38–73)
NEUTROPHILS NFR BLD: 88.7 % (ref 38–73)
NRBC BLD-RTO: 0 /100 WBC
NRBC BLD-RTO: 0 /100 WBC
PHOSPHATE SERPL-MCNC: 2.4 MG/DL (ref 2.7–4.5)
PLATELET # BLD AUTO: 219 K/UL (ref 150–450)
PLATELET # BLD AUTO: 227 K/UL (ref 150–450)
PLATELET # BLD AUTO: 233 K/UL (ref 150–450)
PLATELET # BLD AUTO: 236 K/UL (ref 150–450)
PMV BLD AUTO: 10 FL (ref 9.2–12.9)
PMV BLD AUTO: 10.3 FL (ref 9.2–12.9)
POCT GLUCOSE: 154 MG/DL (ref 70–110)
POCT GLUCOSE: 157 MG/DL (ref 70–110)
POCT GLUCOSE: 184 MG/DL (ref 70–110)
POCT GLUCOSE: 289 MG/DL (ref 70–110)
POTASSIUM SERPL-SCNC: 4.3 MMOL/L (ref 3.5–5.1)
PROT SERPL-MCNC: 5.4 G/DL (ref 6–8.4)
RBC # BLD AUTO: 2 M/UL (ref 4.6–6.2)
RBC # BLD AUTO: 2.07 M/UL (ref 4.6–6.2)
RBC # BLD AUTO: 2.46 M/UL (ref 4.6–6.2)
RBC # BLD AUTO: 2.51 M/UL (ref 4.6–6.2)
SODIUM SERPL-SCNC: 134 MMOL/L (ref 136–145)
WBC # BLD AUTO: 7.28 K/UL (ref 3.9–12.7)
WBC # BLD AUTO: 7.83 K/UL (ref 3.9–12.7)
WBC # BLD AUTO: 8.43 K/UL (ref 3.9–12.7)
WBC # BLD AUTO: 8.46 K/UL (ref 3.9–12.7)

## 2021-12-26 PROCEDURE — 86920 COMPATIBILITY TEST SPIN: CPT

## 2021-12-26 PROCEDURE — 27200966 HC CLOSED SUCTION SYSTEM

## 2021-12-26 PROCEDURE — 63600175 PHARM REV CODE 636 W HCPCS

## 2021-12-26 PROCEDURE — 63600175 PHARM REV CODE 636 W HCPCS: Performed by: STUDENT IN AN ORGANIZED HEALTH CARE EDUCATION/TRAINING PROGRAM

## 2021-12-26 PROCEDURE — P9021 RED BLOOD CELLS UNIT: HCPCS

## 2021-12-26 PROCEDURE — 27000221 HC OXYGEN, UP TO 24 HOURS

## 2021-12-26 PROCEDURE — 99233 SBSQ HOSP IP/OBS HIGH 50: CPT | Mod: ,,, | Performed by: SURGERY

## 2021-12-26 PROCEDURE — 94640 AIRWAY INHALATION TREATMENT: CPT

## 2021-12-26 PROCEDURE — 99231 SBSQ HOSP IP/OBS SF/LOW 25: CPT | Mod: ,,, | Performed by: OTOLARYNGOLOGY

## 2021-12-26 PROCEDURE — 84100 ASSAY OF PHOSPHORUS: CPT | Performed by: STUDENT IN AN ORGANIZED HEALTH CARE EDUCATION/TRAINING PROGRAM

## 2021-12-26 PROCEDURE — 85027 COMPLETE CBC AUTOMATED: CPT | Performed by: STUDENT IN AN ORGANIZED HEALTH CARE EDUCATION/TRAINING PROGRAM

## 2021-12-26 PROCEDURE — 83735 ASSAY OF MAGNESIUM: CPT | Performed by: STUDENT IN AN ORGANIZED HEALTH CARE EDUCATION/TRAINING PROGRAM

## 2021-12-26 PROCEDURE — 99233 PR SUBSEQUENT HOSPITAL CARE,LEVL III: ICD-10-PCS | Mod: ,,, | Performed by: SURGERY

## 2021-12-26 PROCEDURE — 80053 COMPREHEN METABOLIC PANEL: CPT | Performed by: STUDENT IN AN ORGANIZED HEALTH CARE EDUCATION/TRAINING PROGRAM

## 2021-12-26 PROCEDURE — 85025 COMPLETE CBC W/AUTO DIFF WBC: CPT | Performed by: STUDENT IN AN ORGANIZED HEALTH CARE EDUCATION/TRAINING PROGRAM

## 2021-12-26 PROCEDURE — 99231 PR SUBSEQUENT HOSPITAL CARE,LEVL I: ICD-10-PCS | Mod: ,,, | Performed by: OTOLARYNGOLOGY

## 2021-12-26 PROCEDURE — 25000003 PHARM REV CODE 250: Performed by: STUDENT IN AN ORGANIZED HEALTH CARE EDUCATION/TRAINING PROGRAM

## 2021-12-26 PROCEDURE — 86850 RBC ANTIBODY SCREEN: CPT

## 2021-12-26 PROCEDURE — 86900 BLOOD TYPING SEROLOGIC ABO: CPT

## 2021-12-26 PROCEDURE — 99900035 HC TECH TIME PER 15 MIN (STAT)

## 2021-12-26 PROCEDURE — 20000000 HC ICU ROOM

## 2021-12-26 PROCEDURE — 94761 N-INVAS EAR/PLS OXIMETRY MLT: CPT

## 2021-12-26 PROCEDURE — 63600175 PHARM REV CODE 636 W HCPCS: Performed by: SURGERY

## 2021-12-26 PROCEDURE — 85025 COMPLETE CBC W/AUTO DIFF WBC: CPT | Mod: 91

## 2021-12-26 RX ORDER — SODIUM CHLORIDE 9 MG/ML
INJECTION, SOLUTION INTRAVENOUS
Status: DISCONTINUED | OUTPATIENT
Start: 2021-12-26 | End: 2021-12-31

## 2021-12-26 RX ORDER — DILTIAZEM HCL 1 MG/ML
0-15 INJECTION, SOLUTION INTRAVENOUS CONTINUOUS
Status: DISCONTINUED | OUTPATIENT
Start: 2021-12-26 | End: 2021-12-28

## 2021-12-26 RX ORDER — HYDROCODONE BITARTRATE AND ACETAMINOPHEN 500; 5 MG/1; MG/1
TABLET ORAL
Status: DISCONTINUED | OUTPATIENT
Start: 2021-12-26 | End: 2021-12-31

## 2021-12-26 RX ORDER — GLUCAGON 1 MG
1 KIT INJECTION
Status: DISCONTINUED | OUTPATIENT
Start: 2021-12-26 | End: 2021-12-31

## 2021-12-26 RX ORDER — SODIUM CHLORIDE, SODIUM LACTATE, POTASSIUM CHLORIDE, CALCIUM CHLORIDE 600; 310; 30; 20 MG/100ML; MG/100ML; MG/100ML; MG/100ML
INJECTION, SOLUTION INTRAVENOUS CONTINUOUS
Status: DISCONTINUED | OUTPATIENT
Start: 2021-12-26 | End: 2021-12-28

## 2021-12-26 RX ORDER — INSULIN ASPART 100 [IU]/ML
1-10 INJECTION, SOLUTION INTRAVENOUS; SUBCUTANEOUS
Status: DISCONTINUED | OUTPATIENT
Start: 2021-12-26 | End: 2021-12-26

## 2021-12-26 RX ORDER — SODIUM,POTASSIUM PHOSPHATES 280-250MG
1 POWDER IN PACKET (EA) ORAL ONCE
Status: DISCONTINUED | OUTPATIENT
Start: 2021-12-26 | End: 2021-12-26

## 2021-12-26 RX ORDER — INSULIN ASPART 100 [IU]/ML
0-5 INJECTION, SOLUTION INTRAVENOUS; SUBCUTANEOUS EVERY 6 HOURS PRN
Status: DISCONTINUED | OUTPATIENT
Start: 2021-12-26 | End: 2021-12-31

## 2021-12-26 RX ORDER — IBUPROFEN 200 MG
24 TABLET ORAL
Status: DISCONTINUED | OUTPATIENT
Start: 2021-12-26 | End: 2021-12-26

## 2021-12-26 RX ORDER — GLUCAGON 1 MG
1 KIT INJECTION
Status: DISCONTINUED | OUTPATIENT
Start: 2021-12-26 | End: 2021-12-26

## 2021-12-26 RX ORDER — IBUPROFEN 200 MG
16 TABLET ORAL
Status: DISCONTINUED | OUTPATIENT
Start: 2021-12-26 | End: 2021-12-26

## 2021-12-26 RX ADMIN — FAMOTIDINE 20 MG: 10 INJECTION, SOLUTION INTRAVENOUS at 08:12

## 2021-12-26 RX ADMIN — SODIUM CHLORIDE, SODIUM LACTATE, POTASSIUM CHLORIDE, AND CALCIUM CHLORIDE: .6; .31; .03; .02 INJECTION, SOLUTION INTRAVENOUS at 10:12

## 2021-12-26 RX ADMIN — FAMOTIDINE 20 MG: 10 INJECTION, SOLUTION INTRAVENOUS at 09:12

## 2021-12-26 RX ADMIN — TRANEXAMIC ACID 500 MG: 100 INJECTION, SOLUTION INTRAVENOUS at 04:12

## 2021-12-26 RX ADMIN — MAGNESIUM SULFATE 2 G: 2 INJECTION INTRAVENOUS at 04:12

## 2021-12-26 RX ADMIN — MUPIROCIN: 20 OINTMENT TOPICAL at 08:12

## 2021-12-26 RX ADMIN — MUPIROCIN: 20 OINTMENT TOPICAL at 09:12

## 2021-12-26 RX ADMIN — INSULIN ASPART 6 UNITS: 100 INJECTION, SOLUTION INTRAVENOUS; SUBCUTANEOUS at 08:12

## 2021-12-26 NOTE — PROGRESS NOTES
Assumed care of patient. Appears calm. Voice is hoarse, coarse breath sounds, and frequent cough. Family is at bedside and attentive to patient.

## 2021-12-26 NOTE — PROGRESS NOTES
Notified Dr. Weber of patient with coughing episode following a sip of water. Patient stated he did not have difficulty swallowing and coughing was associated to current issue of hemoptysis. Will make NPO per orders and consult SLP.    Also, notified Dr. Weber of patient with periods of apnea noted while sleeping (lasting ~ 3 seconds); patient spontaneously wakes up and resumes breathing when awake. VSS and SpO2 is unchanged during this episode. Dr. Weber and Dr. Lozada present at bedside and patient's wife states this has occurred for ~ 1 month at home prior to admit. No new orders at this time.    Per Dr. Lozada, ok to attempt giving a patient a sip of water to take medications but otherwise NPO. Will reattempt bedside swallow if appropriate.

## 2021-12-26 NOTE — PLAN OF CARE
SICU PLAN OF CARE NOTE    Dx: SCC with hemoptysis    Shift Events: Oxygen @ HS. Lytes replaced PRN. Comfort and rest promoted.    Goals of Care: MAPs > 65, SBP < 180, SpO2 > 90%    Neuro: AAO x4, Follows Commands, and Moves All Extremities    Vital Signs: BP (!) 180/85   Pulse (!) 121   Temp 99.5 °F (37.5 °C) (Oral)   Resp (!) 33   Wt 66.7 kg (147 lb)   SpO2 (!) 94%   BMI 23.73 kg/m²     Respiratory: Nasal Cannula    Diet: Regular Diet with poor appetite.    Gtts: No gtts    Urine Output: Voids Spontaneously 400 cc/shift + 1 unmeasured     Labs/Accuchecks: Daily chem. BID CBC. Accuchecks ACHS.    Skin: No breakdown noted. Pt repositions independently and frequently. Pillows in use. Waffle inflated. CHG wipes, face wipes, and oral care products provided. Complete linen change.

## 2021-12-26 NOTE — CONSULTS
General Surgery    Chief Complaint: Asked to see pt for enteral access by ENT service    History of Present Illness:  71 y.o. male with SCCa of the glottis/subglottis s/p IMRT (-10/30/21) with persistent low volume tumor bleeding.  ENT is planning on doing a tracheostomy tomorrow and have asked us to place G tube for enteral access.    Allergies: Maybe Lovastatin  PMHx: NSVT, DM2  PSHx: No prior abdominal surgeries, several tumor biopsies    Review of Systems   Constitutional: Negative for chills, fever and weight loss.   HENT: Negative for ear discharge, ear pain and sinus pain.    Eyes: Negative for blurred vision, double vision, pain, discharge and redness.   Respiratory: Positive for cough and hemoptysis. Negative for sputum production, shortness of breath and stridor.    Cardiovascular: Negative for chest pain, palpitations, orthopnea and claudication.   Gastrointestinal: Negative for abdominal pain, blood in stool, constipation, diarrhea, heartburn, melena, nausea and vomiting.   Genitourinary: Negative for dysuria.   Musculoskeletal: Negative for neck pain.   Neurological: Negative for dizziness, focal weakness and weakness.   Endo/Heme/Allergies: Does not bruise/bleed easily.   Psychiatric/Behavioral: Negative for depression, hallucinations and substance abuse.        Vital Signs (Most Recent)  Temp: 98.9 °F (37.2 °C) (12/26/21 1530)  Pulse: 101 (12/26/21 1630)  Resp: (!) 25 (12/26/21 1630)  BP: (!) 124/58 (12/26/21 1630)  SpO2: 99 % (12/26/21 1630)    Physical Exam   Constitutional: He is oriented to person, place, and time. He appears well-developed and well-nourished. No distress.   Reports constantly feeling blood drip down his throat, more pronounced when lying flat   HENT:   Head: Normocephalic and atraumatic.   Eyes: EOM are normal. Right eye exhibits no discharge. Left eye exhibits no discharge.   Cardiovascular: Regular rhythm and intact distal pulses.   Pulmonary/Chest: Effort normal. No  respiratory distress.   Abdominal: He exhibits no distension.   Musculoskeletal:         General: No deformity or edema. Normal range of motion.      Cervical back: Normal range of motion and neck supple.   Neurological: He is alert and oriented to person, place, and time. No cranial nerve deficit.   Skin: Skin is warm and dry. Capillary refill takes less than 2 seconds. He is not diaphoretic. No erythema.   Psychiatric: He has a normal mood and affect. His behavior is normal.        Labs- Hgb 7.2  Imaging- No abdominal imaging noted in chart    Assessment and Plan:  71 y.o. male w/ bleeding subglottic/glottic tumor    -Discussed case with ENT, who feel there is a decent chance we will not be able to pass an EGD past the tumor.  Explained to the patient that if this happens, we will need to do an open G tube, but we will try to do a PEG first and only convert to open G tube if PEG isn't possible.  -Consent obtained, case request added on     Luis Feldman MD  General Surgery, PGY-4  [phone]

## 2021-12-26 NOTE — PROGRESS NOTES
[facility]  Critical Care - Surgery  Progress Note    Patient Name: Cyrus Batres Jr.  MRN: [id number]  Admission Date: 12/25/2021  Hospital Length of Stay: 1 days  Code Status: Full Code  Attending Provider: Flex Espinosa MD  Primary Care Provider: Diana Calhoun MD   Principal Problem: <principal problem not specified>    Subjective:     Hospital/ICU Course:  No notes on file    Interval History/Significant Events: NAONE. Intermittent coughing fits.         Objective:     Vital Signs (Most Recent):  Temp: 99.5 °F (37.5 °C) (12/26/21 0300)  Pulse: (!) 130 (12/26/21 0515)  Resp: (!) 29 (12/26/21 0515)  BP: (!) 153/74 (12/26/21 0500)  SpO2: (!) 91 % (12/26/21 0515) Vital Signs (24h Range):  Temp:  [98.1 °F (36.7 °C)-99.5 °F (37.5 °C)] 99.5 °F (37.5 °C)  Pulse:  [] 130  Resp:  [17-54] 29  SpO2:  [84 %-100 %] 91 %  BP: (122-180)/(60-86) 153/74     Weight: 66.7 kg (147 lb)  Body mass index is 23.73 kg/m².      Intake/Output Summary (Last 24 hours) at 12/26/2021 0528  Last data filed at 12/26/2021 0400  Gross per 24 hour   Intake 836.33 ml   Output 400 ml   Net 436.33 ml       Physical Exam  Vitals and nursing note reviewed.   Constitutional:       Appearance: Normal appearance. He is normal weight.   HENT:      Head: Normocephalic and atraumatic.      Right Ear: Tympanic membrane normal.      Left Ear: Tympanic membrane normal.      Mouth/Throat:      Mouth: Mucous membranes are moist.      Pharynx: Oropharynx is clear.   Eyes:      Extraocular Movements: Extraocular movements intact.      Pupils: Pupils are equal, round, and reactive to light.   Cardiovascular:      Rate and Rhythm: Normal rate and regular rhythm.      Pulses: Normal pulses.      Heart sounds: Normal heart sounds.   Pulmonary:      Effort: Pulmonary effort is normal.      Breath sounds: Normal breath sounds.      Comments: Mild stridor; at his baseline.    Abdominal:      General: Abdomen is flat.      Palpations: Abdomen  is soft.   Musculoskeletal:      Cervical back: Normal range of motion.   Skin:     General: Skin is warm and dry.      Capillary Refill: Capillary refill takes less than 2 seconds.   Neurological:      General: No focal deficit present.      Mental Status: He is alert and oriented to person, place, and time. Mental status is at baseline.   Psychiatric:         Mood and Affect: Mood normal.         Vents:       Lines/Drains/Airways     Peripheral Intravenous Line                 Peripheral IV - Single Lumen 12/24/21 1929 20 G Right Antecubital 1 day         Peripheral IV - Single Lumen 12/25/21 0542 18 G Left Antecubital <1 day                Significant Labs:    CBC/Anemia Profile:  Recent Labs   Lab 12/25/21  0915 12/25/21 2024 12/26/21  0310   WBC 9.55 8.49 8.46   HGB 7.9* 7.0* 7.0*   HCT 23.8* 21.3* 20.9*    200 236   MCV 87 90 85   RDW 12.2 12.7 12.0        Chemistries:  Recent Labs   Lab 12/24/21 1930 12/26/21  0310   * 134*   K 3.9 4.3    104   CO2 23 22*   BUN 29* 26*   CREATININE 1.0 1.2   CALCIUM 8.8 8.6*   ALBUMIN 3.3* 3.0*   PROT 6.0 5.4*   BILITOT 1.0 1.2*   ALKPHOS 72 79   ALT 13 13   AST 14 16   MG 1.6 1.7   PHOS  --  2.4*         Assessment/Plan:     Hemoptysis  72yo M w h/o Afib, HTN, DM (diet controlled), and laryngeal ca. Presents w hemoptysis. ENT admits to SICU for airway watch.      Neuro/Psych:   -- Sedation: None   -- Pain: Tylenol. Takes norco at home. If requiring pain medications, consider oxy 5 PRN to start              Cards:   -- HDS  -- Maintain MAP > 65  -- Okay to restart home dilt  -- Will hold on home losartan given possibility of intubation w sedation and change in when patient may go to OR      Pulm:   -- Goal O2 sat > 90%  -- Nebulized TXA prn for hemoptysis  -- Intubatable with a a 6-0 ETT should bleeding begin to compromise his airway. Call anesthesia for intubation as tumor increases complexity of intubation. Consider spontaneous breathing fiberoptic  intubation      Renal:  -- Will hold off on mahan for now. Should clinical condition worsen, will place for strict I/Os  -- BUN/Cr baseline      FEN / GI:   -- Replace lytes as needed  -- Nutrition: full diet per primary team   -- mIVF while NPO      ID:   -- Tm: afebrile; WBC wnl  -- Abx none      Heme/Onc:   -- H/H stable  -- q12 CBC  -- Transfuse to maintain hgb >7  -- Continue to hold home eliquis for now  -- Will consider starting sqh      Endo:   -- Gluc goal 140-180  -- ISS      PPx:   Feeding: NPO  Analgesia/Sedation: tylenol / none  Thromboembolic prevention: consider sqh  HOB >30: y  Stress Ulcer ppx: famotidine  Glucose control: Critical care goal 140-180 g/dl, ISS    Lines/Drains/Airway: PIV x2      Dispo/Code Status/Palliative:   -- SICU / Full Code           Critical care was time spent personally by me on the following activities: development of treatment plan with patient or surrogate and bedside caregivers, discussions with consultants, evaluation of patient's response to treatment, examination of patient, ordering and performing treatments and interventions, ordering and review of laboratory studies, ordering and review of radiographic studies, pulse oximetry, re-evaluation of patient's condition.  This critical care time did not overlap with that of any other provider or involve time for any procedures.     Herman Wright MD  Critical Care - Surgery  [facility]

## 2021-12-26 NOTE — ASSESSMENT & PLAN NOTE
70yo M w h/o Afib, HTN, DM (diet controlled), and laryngeal ca. Presents w hemoptysis. ENT admits to SICU for airway watch.      Neuro/Psych:   -- Sedation: None   -- Pain: Tylenol. Takes norco at home. If requiring pain medications, consider oxy 5 PRN to start              Cards:   -- HDS  -- Maintain MAP > 65  -- Okay to restart home dilt  -- Will hold on home losartan given possibility of intubation w sedation and change in when patient may go to OR      Pulm:   -- Goal O2 sat > 90%  -- Nebulized TXA prn for hemoptysis  -- Intubatable with a a 6-0 ETT should bleeding begin to compromise his airway. Call anesthesia for intubation as tumor increases complexity of intubation. Consider spontaneous breathing fiberoptic intubation      Renal:  -- Will hold off on mahan for now. Should clinical condition worsen, will place for strict I/Os  -- BUN/Cr baseline      FEN / GI:   -- Replace lytes as needed  -- Nutrition: full diet per primary team   -- mIVF while NPO      ID:   -- Tm: afebrile; WBC wnl  -- Abx none      Heme/Onc:   -- H/H stable  -- q12 CBC  -- Transfuse to maintain hgb >7  -- Continue to hold home eliquis for now  -- Will consider starting sqh      Endo:   -- Gluc goal 140-180  -- ISS      PPx:   Feeding: NPO  Analgesia/Sedation: tylenol / none  Thromboembolic prevention: consider sqh  HOB >30: y  Stress Ulcer ppx: famotidine  Glucose control: Critical care goal 140-180 g/dl, ISS    Lines/Drains/Airway: PIV x2      Dispo/Code Status/Palliative:   -- SICU / Full Code

## 2021-12-26 NOTE — PROGRESS NOTES
SpO2 maintained in 80s while asleep. 2L NC applied with SpO2 recovery to low 90s. Dr. Wright notified, orders received.

## 2021-12-26 NOTE — SUBJECTIVE & OBJECTIVE
Interval History: Mild improvement with neb TXA. Hgb drop to 7.0 with repeat at 7.2. Difficulty sleeping overnight due to stimulation from the constant trickle of blood. Failed bedside swallow. NPO currently.     Medications:  Continuous Infusions:   dilTIAZem      lactated ringers 75 mL/hr at 12/26/21 1500     Scheduled Meds:   enoxaparin  40 mg Subcutaneous Daily    famotidine (PF)  20 mg Intravenous Q12H    mupirocin   Nasal BID    QUEtiapine  25 mg Oral QHS     PRN Meds:sodium chloride 0.9%, calcium gluconate IVPB, calcium gluconate IVPB, calcium gluconate IVPB, dextrose 50%, glucagon (human recombinant), insulin aspart U-100, magnesium oxide, magnesium oxide, magnesium sulfate IVPB, magnesium sulfate IVPB, potassium bicarbonate, potassium bicarbonate, potassium bicarbonate, potassium chloride in water **AND** potassium chloride in water **AND** potassium chloride in water, sodium chloride 0.9%, sodium chloride 0.9%, tranexamic acid     Review of patient's allergies indicates:   Allergen Reactions    Pollen extracts     Lovastatin Rash     Not confirmed but pt skeptical     Objective:     Vital Signs (24h Range):  Temp:  [98.7 °F (37.1 °C)-99.5 °F (37.5 °C)] 99.3 °F (37.4 °C)  Pulse:  [] 102  Resp:  [20-54] 39  SpO2:  [84 %-100 %] 90 %  BP: (114-180)/(56-86) 115/56     Date 12/26/21 0700 - 12/27/21 0659   Shift 2447-5169 3322-7062 0931-7368 24 Hour Total   INTAKE   I.V.(mL/kg) 354.8(5.3) 75(1.1)  429.8(6.5)   Shift Total(mL/kg) 354.8(5.3) 75(1.1)  429.8(6.5)   OUTPUT   Shift Total(mL/kg)       Weight (kg) 66.6 66.6 66.6 66.6     Lines/Drains/Airways     Peripheral Intravenous Line                 Peripheral IV - Single Lumen 12/26/21 0800 20 G Left;Posterior Forearm <1 day         Peripheral IV - Single Lumen 12/26/21 0937 20 G Anterior;Right Upper Arm <1 day                Physical Exam  NAD, conversant  EOMi, PERRL   OC/OP wnl, fresh blood in oropharynx, BOT smooth, FOM soft  Post-radiation  fibrosis of the neck  No overlying skin changes or ecchymosis  Aerating well on room air  Intermittent coughing of blood tinged saliva while in the room    Significant Labs:  CBC:   Recent Labs   Lab 12/26/21  0802   WBC 8.43   RBC 2.51*   HGB 7.2*   HCT 21.5*      MCV 86   MCH 28.7   MCHC 33.5     CMP:   Recent Labs   Lab 12/26/21  0310   *   CALCIUM 8.6*   ALBUMIN 3.0*   PROT 5.4*   *   K 4.3   CO2 22*      BUN 26*   CREATININE 1.2   ALKPHOS 79   ALT 13   AST 16   BILITOT 1.2*       Significant Diagnostics:  I have reviewed and interpreted all pertinent imaging results/findings within the past 24 hours.

## 2021-12-26 NOTE — ASSESSMENT & PLAN NOTE
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     Discussed in detail he and his wife his situation and the options prior to his laryngectomy which include intubation, tracheostomy creation or conservative measures. He is unlikely to be able to tolerate conservative measures alone due to frequent stimulation of his larynx from the bleeding. After a long discussion, they agreed to proceed with placement of a tracheostomy tube prior to his definitive laryngectomy.     - Will plan for trach/g-tube tomorrow afternoon with Dr Espinosa and Gen surgery  - Keep NPO after midnight  - ICU for airway observation  - Nebulized TXA q4h prn  - Continue to hold home eliquis for now, ok with subq heparin  - Home meds restarted otherwise  - Intubatable with a a 6-0 ETT should bleeding begin to compromise his airway  - NPO, IVmF  - q12h H/H, Daily labs  - Continue ICU care  - Please call or page ENT with any concerns

## 2021-12-26 NOTE — PROGRESS NOTES
[facility]  Otorhinolaryngology-Head & Neck Surgery  Progress Note    Subjective:     Post-Op Info:  * No surgery found *      Hospital Day: 2     Interval History: Mild improvement with neb TXA. Hgb drop to 7.0 with repeat at 7.2. Difficulty sleeping overnight due to stimulation from the constant trickle of blood. Failed bedside swallow. NPO currently.     Medications:  Continuous Infusions:   dilTIAZem      lactated ringers 75 mL/hr at 12/26/21 1500     Scheduled Meds:   enoxaparin  40 mg Subcutaneous Daily    famotidine (PF)  20 mg Intravenous Q12H    mupirocin   Nasal BID    QUEtiapine  25 mg Oral QHS     PRN Meds:sodium chloride 0.9%, calcium gluconate IVPB, calcium gluconate IVPB, calcium gluconate IVPB, dextrose 50%, glucagon (human recombinant), insulin aspart U-100, magnesium oxide, magnesium oxide, magnesium sulfate IVPB, magnesium sulfate IVPB, potassium bicarbonate, potassium bicarbonate, potassium bicarbonate, potassium chloride in water **AND** potassium chloride in water **AND** potassium chloride in water, sodium chloride 0.9%, sodium chloride 0.9%, tranexamic acid     Review of patient's allergies indicates:   Allergen Reactions    Pollen extracts     Lovastatin Rash     Not confirmed but pt skeptical     Objective:     Vital Signs (24h Range):  Temp:  [98.7 °F (37.1 °C)-99.5 °F (37.5 °C)] 99.3 °F (37.4 °C)  Pulse:  [] 102  Resp:  [20-54] 39  SpO2:  [84 %-100 %] 90 %  BP: (114-180)/(56-86) 115/56     Date 12/26/21 0700 - 12/27/21 0659   Shift 9487-2545 3891-5963 3405-2884 24 Hour Total   INTAKE   I.V.(mL/kg) 354.8(5.3) 75(1.1)  429.8(6.5)   Shift Total(mL/kg) 354.8(5.3) 75(1.1)  429.8(6.5)   OUTPUT   Shift Total(mL/kg)       Weight (kg) 66.6 66.6 66.6 66.6     Lines/Drains/Airways     Peripheral Intravenous Line                 Peripheral IV - Single Lumen 12/26/21 0800 20 G Left;Posterior Forearm <1 day         Peripheral IV - Single Lumen 12/26/21 0937 20 G  Anterior;Right Upper Arm <1 day                Physical Exam  NAD, conversant  EOMi, PERRL   OC/OP wnl, fresh blood in oropharynx, BOT smooth, FOM soft  Post-radiation fibrosis of the neck  No overlying skin changes or ecchymosis  Aerating well on room air  Intermittent coughing of blood tinged saliva while in the room    Significant Labs:  CBC:   Recent Labs   Lab 12/26/21  0802   WBC 8.43   RBC 2.51*   HGB 7.2*   HCT 21.5*      MCV 86   MCH 28.7   MCHC 33.5     CMP:   Recent Labs   Lab 12/26/21  0310   *   CALCIUM 8.6*   ALBUMIN 3.0*   PROT 5.4*   *   K 4.3   CO2 22*      BUN 26*   CREATININE 1.2   ALKPHOS 79   ALT 13   AST 16   BILITOT 1.2*       Significant Diagnostics:  I have reviewed and interpreted all pertinent imaging results/findings within the past 24 hours.    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     Discussed in detail he and his wife his situation and the options prior to his laryngectomy which include intubation, tracheostomy creation or conservative measures. He is unlikely to be able to tolerate conservative measures alone due to frequent stimulation of his larynx from the bleeding. After a long discussion, they agreed to proceed with placement of a tracheostomy tube prior to his definitive laryngectomy.     - Will plan for trach/g-tube tomorrow afternoon with Dr Espinosa and Gen surgery  - Keep NPO after midnight  - ICU for airway observation  - Nebulized TXA q4h prn  - Continue to hold home eliquis for now, ok with subq heparin  - Home meds restarted otherwise  - Intubatable with a a 6-0 ETT should bleeding begin to compromise his airway  - NPO, IVmF  - q12h H/H, Daily labs  - Continue ICU care  - Please call or page ENT with any concerns        Mitesh Tang MD  Otorhinolaryngology-Head & Neck Surgery  [facility]

## 2021-12-26 NOTE — SUBJECTIVE & OBJECTIVE
Interval History/Significant Events: NAONE. Intermittent coughing fits.         Objective:     Vital Signs (Most Recent):  Temp: 99.5 °F (37.5 °C) (12/26/21 0300)  Pulse: (!) 130 (12/26/21 0515)  Resp: (!) 29 (12/26/21 0515)  BP: (!) 153/74 (12/26/21 0500)  SpO2: (!) 91 % (12/26/21 0515) Vital Signs (24h Range):  Temp:  [98.1 °F (36.7 °C)-99.5 °F (37.5 °C)] 99.5 °F (37.5 °C)  Pulse:  [] 130  Resp:  [17-54] 29  SpO2:  [84 %-100 %] 91 %  BP: (122-180)/(60-86) 153/74     Weight: 66.7 kg (147 lb)  Body mass index is 23.73 kg/m².      Intake/Output Summary (Last 24 hours) at 12/26/2021 0528  Last data filed at 12/26/2021 0400  Gross per 24 hour   Intake 836.33 ml   Output 400 ml   Net 436.33 ml       Physical Exam  Vitals and nursing note reviewed.   Constitutional:       Appearance: Normal appearance. He is normal weight.   HENT:      Head: Normocephalic and atraumatic.      Right Ear: Tympanic membrane normal.      Left Ear: Tympanic membrane normal.      Mouth/Throat:      Mouth: Mucous membranes are moist.      Pharynx: Oropharynx is clear.   Eyes:      Extraocular Movements: Extraocular movements intact.      Pupils: Pupils are equal, round, and reactive to light.   Cardiovascular:      Rate and Rhythm: Normal rate and regular rhythm.      Pulses: Normal pulses.      Heart sounds: Normal heart sounds.   Pulmonary:      Effort: Pulmonary effort is normal.      Breath sounds: Normal breath sounds.      Comments: Mild stridor; at his baseline.    Abdominal:      General: Abdomen is flat.      Palpations: Abdomen is soft.   Musculoskeletal:      Cervical back: Normal range of motion.   Skin:     General: Skin is warm and dry.      Capillary Refill: Capillary refill takes less than 2 seconds.   Neurological:      General: No focal deficit present.      Mental Status: He is alert and oriented to person, place, and time. Mental status is at baseline.   Psychiatric:         Mood and Affect: Mood normal.          Vents:       Lines/Drains/Airways     Peripheral Intravenous Line                 Peripheral IV - Single Lumen 12/24/21 1929 20 G Right Antecubital 1 day         Peripheral IV - Single Lumen 12/25/21 0542 18 G Left Antecubital <1 day                Significant Labs:    CBC/Anemia Profile:  Recent Labs   Lab 12/25/21  0915 12/25/21 2024 12/26/21  0310   WBC 9.55 8.49 8.46   HGB 7.9* 7.0* 7.0*   HCT 23.8* 21.3* 20.9*    200 236   MCV 87 90 85   RDW 12.2 12.7 12.0        Chemistries:  Recent Labs   Lab 12/24/21 1930 12/26/21  0310   * 134*   K 3.9 4.3    104   CO2 23 22*   BUN 29* 26*   CREATININE 1.0 1.2   CALCIUM 8.8 8.6*   ALBUMIN 3.3* 3.0*   PROT 6.0 5.4*   BILITOT 1.0 1.2*   ALKPHOS 72 79   ALT 13 13   AST 14 16   MG 1.6 1.7   PHOS  --  2.4*

## 2021-12-26 NOTE — PROGRESS NOTES
Pt reports air hunger and tiredness. MD called to bedside to assess and discuss methods of comfort.

## 2021-12-27 ENCOUNTER — ANESTHESIA (OUTPATIENT)
Dept: SURGERY | Facility: HOSPITAL | Age: 71
DRG: 012 | End: 2021-12-27
Payer: MEDICARE

## 2021-12-27 LAB
ALBUMIN SERPL BCP-MCNC: 2.5 G/DL (ref 3.5–5.2)
ALP SERPL-CCNC: 65 U/L (ref 55–135)
ALT SERPL W/O P-5'-P-CCNC: 10 U/L (ref 10–44)
ANION GAP SERPL CALC-SCNC: 9 MMOL/L (ref 8–16)
ANISOCYTOSIS BLD QL SMEAR: SLIGHT
APTT BLDCRRT: 25 SEC (ref 21–32)
AST SERPL-CCNC: 20 U/L (ref 10–40)
BASO STIPL BLD QL SMEAR: ABNORMAL
BASOPHILS # BLD AUTO: 0.03 K/UL (ref 0–0.2)
BASOPHILS NFR BLD: 0.3 % (ref 0–1.9)
BILIRUB SERPL-MCNC: 3.7 MG/DL (ref 0.1–1)
BLD PROD TYP BPU: NORMAL
BLD PROD TYP BPU: NORMAL
BLOOD UNIT EXPIRATION DATE: NORMAL
BLOOD UNIT EXPIRATION DATE: NORMAL
BLOOD UNIT TYPE CODE: 7300
BLOOD UNIT TYPE CODE: 7300
BLOOD UNIT TYPE: NORMAL
BLOOD UNIT TYPE: NORMAL
BUN SERPL-MCNC: 25 MG/DL (ref 8–23)
BURR CELLS BLD QL SMEAR: ABNORMAL
CALCIUM SERPL-MCNC: 8.3 MG/DL (ref 8.7–10.5)
CHLORIDE SERPL-SCNC: 105 MMOL/L (ref 95–110)
CO2 SERPL-SCNC: 21 MMOL/L (ref 23–29)
CODING SYSTEM: NORMAL
CODING SYSTEM: NORMAL
CREAT SERPL-MCNC: 0.8 MG/DL (ref 0.5–1.4)
DIFFERENTIAL METHOD: ABNORMAL
DISPENSE STATUS: NORMAL
DISPENSE STATUS: NORMAL
EOSINOPHIL # BLD AUTO: 0.1 K/UL (ref 0–0.5)
EOSINOPHIL NFR BLD: 0.9 % (ref 0–8)
ERYTHROCYTE [DISTWIDTH] IN BLOOD BY AUTOMATED COUNT: 13.2 % (ref 11.5–14.5)
ERYTHROCYTE [DISTWIDTH] IN BLOOD BY AUTOMATED COUNT: 13.2 % (ref 11.5–14.5)
ERYTHROCYTE [DISTWIDTH] IN BLOOD BY AUTOMATED COUNT: 13.3 % (ref 11.5–14.5)
EST. GFR  (AFRICAN AMERICAN): >60 ML/MIN/1.73 M^2
EST. GFR  (NON AFRICAN AMERICAN): >60 ML/MIN/1.73 M^2
GLUCOSE SERPL-MCNC: 155 MG/DL (ref 70–110)
HCT VFR BLD AUTO: 16.7 % (ref 40–54)
HCT VFR BLD AUTO: 22.4 % (ref 40–54)
HCT VFR BLD AUTO: 22.4 % (ref 40–54)
HGB BLD-MCNC: 5.6 G/DL (ref 14–18)
HGB BLD-MCNC: 7.2 G/DL (ref 14–18)
HGB BLD-MCNC: 7.4 G/DL (ref 14–18)
IMM GRANULOCYTES # BLD AUTO: 0.09 K/UL (ref 0–0.04)
IMM GRANULOCYTES NFR BLD AUTO: 1 % (ref 0–0.5)
INR PPP: 1.1 (ref 0.8–1.2)
LYMPHOCYTES # BLD AUTO: 0.5 K/UL (ref 1–4.8)
LYMPHOCYTES NFR BLD: 5.1 % (ref 18–48)
MAGNESIUM SERPL-MCNC: 1.9 MG/DL (ref 1.6–2.6)
MCH RBC QN AUTO: 28.1 PG (ref 27–31)
MCH RBC QN AUTO: 28.2 PG (ref 27–31)
MCH RBC QN AUTO: 28.7 PG (ref 27–31)
MCHC RBC AUTO-ENTMCNC: 32.1 G/DL (ref 32–36)
MCHC RBC AUTO-ENTMCNC: 33 G/DL (ref 32–36)
MCHC RBC AUTO-ENTMCNC: 33.5 G/DL (ref 32–36)
MCV RBC AUTO: 86 FL (ref 82–98)
MCV RBC AUTO: 86 FL (ref 82–98)
MCV RBC AUTO: 88 FL (ref 82–98)
MONOCYTES # BLD AUTO: 0.5 K/UL (ref 0.3–1)
MONOCYTES NFR BLD: 5.2 % (ref 4–15)
NEUTROPHILS # BLD AUTO: 7.8 K/UL (ref 1.8–7.7)
NEUTROPHILS NFR BLD: 87.5 % (ref 38–73)
NRBC BLD-RTO: 0 /100 WBC
PHOSPHATE SERPL-MCNC: 2.2 MG/DL (ref 2.7–4.5)
PLATELET # BLD AUTO: 207 K/UL (ref 150–450)
PLATELET # BLD AUTO: 216 K/UL (ref 150–450)
PLATELET # BLD AUTO: 246 K/UL (ref 150–450)
PLATELET BLD QL SMEAR: ABNORMAL
PMV BLD AUTO: 10.1 FL (ref 9.2–12.9)
PMV BLD AUTO: 10.3 FL (ref 9.2–12.9)
PMV BLD AUTO: 10.5 FL (ref 9.2–12.9)
POCT GLUCOSE: 187 MG/DL (ref 70–110)
POCT GLUCOSE: 197 MG/DL (ref 70–110)
POCT GLUCOSE: 282 MG/DL (ref 70–110)
POIKILOCYTOSIS BLD QL SMEAR: SLIGHT
POLYCHROMASIA BLD QL SMEAR: ABNORMAL
POTASSIUM SERPL-SCNC: 4.3 MMOL/L (ref 3.5–5.1)
PROT SERPL-MCNC: 4.7 G/DL (ref 6–8.4)
PROTHROMBIN TIME: 11.8 SEC (ref 9–12.5)
RBC # BLD AUTO: 1.95 M/UL (ref 4.6–6.2)
RBC # BLD AUTO: 2.56 M/UL (ref 4.6–6.2)
RBC # BLD AUTO: 2.62 M/UL (ref 4.6–6.2)
SODIUM SERPL-SCNC: 135 MMOL/L (ref 136–145)
TRANS ERYTHROCYTES VOL PATIENT: NORMAL ML
TRANS ERYTHROCYTES VOL PATIENT: NORMAL ML
WBC # BLD AUTO: 7.27 K/UL (ref 3.9–12.7)
WBC # BLD AUTO: 8.16 K/UL (ref 3.9–12.7)
WBC # BLD AUTO: 8.88 K/UL (ref 3.9–12.7)

## 2021-12-27 PROCEDURE — 63600175 PHARM REV CODE 636 W HCPCS: Performed by: STUDENT IN AN ORGANIZED HEALTH CARE EDUCATION/TRAINING PROGRAM

## 2021-12-27 PROCEDURE — 31526 PR LARYNGOSCOPY,DIRECT,DX,OP MICROSCOP: ICD-10-PCS | Mod: 51,,, | Performed by: OTOLARYNGOLOGY

## 2021-12-27 PROCEDURE — 25000003 PHARM REV CODE 250: Performed by: OTOLARYNGOLOGY

## 2021-12-27 PROCEDURE — 63600175 PHARM REV CODE 636 W HCPCS

## 2021-12-27 PROCEDURE — 63600175 PHARM REV CODE 636 W HCPCS: Performed by: SURGERY

## 2021-12-27 PROCEDURE — P9016 RBC LEUKOCYTES REDUCED: HCPCS

## 2021-12-27 PROCEDURE — P9021 RED BLOOD CELLS UNIT: HCPCS

## 2021-12-27 PROCEDURE — 88305 TISSUE EXAM BY PATHOLOGIST: ICD-10-PCS | Mod: 26,HCNC,, | Performed by: STUDENT IN AN ORGANIZED HEALTH CARE EDUCATION/TRAINING PROGRAM

## 2021-12-27 PROCEDURE — 43246 PR EGD, FLEX, W/PLCMT, GASTROSTOMY TUBE: ICD-10-PCS | Mod: ,,, | Performed by: SURGERY

## 2021-12-27 PROCEDURE — 84100 ASSAY OF PHOSPHORUS: CPT | Performed by: STUDENT IN AN ORGANIZED HEALTH CARE EDUCATION/TRAINING PROGRAM

## 2021-12-27 PROCEDURE — 27200966 HC CLOSED SUCTION SYSTEM

## 2021-12-27 PROCEDURE — 88305 TISSUE EXAM BY PATHOLOGIST: CPT | Mod: 26,HCNC,, | Performed by: STUDENT IN AN ORGANIZED HEALTH CARE EDUCATION/TRAINING PROGRAM

## 2021-12-27 PROCEDURE — 25000003 PHARM REV CODE 250: Performed by: STUDENT IN AN ORGANIZED HEALTH CARE EDUCATION/TRAINING PROGRAM

## 2021-12-27 PROCEDURE — 20000000 HC ICU ROOM

## 2021-12-27 PROCEDURE — 85610 PROTHROMBIN TIME: CPT | Performed by: OTOLARYNGOLOGY

## 2021-12-27 PROCEDURE — 37000008 HC ANESTHESIA 1ST 15 MINUTES: Performed by: OTOLARYNGOLOGY

## 2021-12-27 PROCEDURE — 36000708 HC OR TIME LEV III 1ST 15 MIN: Performed by: OTOLARYNGOLOGY

## 2021-12-27 PROCEDURE — 27800903 OPTIME MED/SURG SUP & DEVICES OTHER IMPLANTS: Performed by: OTOLARYNGOLOGY

## 2021-12-27 PROCEDURE — 99900035 HC TECH TIME PER 15 MIN (STAT)

## 2021-12-27 PROCEDURE — 31600 PR TRACHEOSTOMY, PLANNED: ICD-10-PCS | Mod: 59,,, | Performed by: OTOLARYNGOLOGY

## 2021-12-27 PROCEDURE — 85730 THROMBOPLASTIN TIME PARTIAL: CPT | Performed by: OTOLARYNGOLOGY

## 2021-12-27 PROCEDURE — 94761 N-INVAS EAR/PLS OXIMETRY MLT: CPT

## 2021-12-27 PROCEDURE — 99291 CRITICAL CARE FIRST HOUR: CPT | Mod: GC,,, | Performed by: SURGERY

## 2021-12-27 PROCEDURE — 99291 PR CRITICAL CARE, E/M 30-74 MINUTES: ICD-10-PCS | Mod: GC,,, | Performed by: SURGERY

## 2021-12-27 PROCEDURE — 43246 EGD PLACE GASTROSTOMY TUBE: CPT | Mod: ,,, | Performed by: SURGERY

## 2021-12-27 PROCEDURE — 27201423 OPTIME MED/SURG SUP & DEVICES STERILE SUPPLY: Performed by: OTOLARYNGOLOGY

## 2021-12-27 PROCEDURE — 88311 DECALCIFY TISSUE: CPT | Mod: 26,HCNC,, | Performed by: STUDENT IN AN ORGANIZED HEALTH CARE EDUCATION/TRAINING PROGRAM

## 2021-12-27 PROCEDURE — 83735 ASSAY OF MAGNESIUM: CPT | Performed by: STUDENT IN AN ORGANIZED HEALTH CARE EDUCATION/TRAINING PROGRAM

## 2021-12-27 PROCEDURE — 88311 DECALCIFY TISSUE: CPT | Performed by: STUDENT IN AN ORGANIZED HEALTH CARE EDUCATION/TRAINING PROGRAM

## 2021-12-27 PROCEDURE — 88300 SURGICAL PATH GROSS: CPT | Mod: HCNC | Performed by: STUDENT IN AN ORGANIZED HEALTH CARE EDUCATION/TRAINING PROGRAM

## 2021-12-27 PROCEDURE — 85025 COMPLETE CBC W/AUTO DIFF WBC: CPT

## 2021-12-27 PROCEDURE — 31600 PLANNED TRACHEOSTOMY: CPT | Mod: 59,,, | Performed by: OTOLARYNGOLOGY

## 2021-12-27 PROCEDURE — 88311 PR  DECALCIFY TISSUE: ICD-10-PCS | Mod: 26,HCNC,, | Performed by: STUDENT IN AN ORGANIZED HEALTH CARE EDUCATION/TRAINING PROGRAM

## 2021-12-27 PROCEDURE — D9220A PRA ANESTHESIA: ICD-10-PCS | Mod: ,,, | Performed by: ANESTHESIOLOGY

## 2021-12-27 PROCEDURE — 85027 COMPLETE CBC AUTOMATED: CPT | Performed by: STUDENT IN AN ORGANIZED HEALTH CARE EDUCATION/TRAINING PROGRAM

## 2021-12-27 PROCEDURE — 31526 DX LARYNGOSCOPY W/OPER SCOPE: CPT | Mod: 51,,, | Performed by: OTOLARYNGOLOGY

## 2021-12-27 PROCEDURE — 37000009 HC ANESTHESIA EA ADD 15 MINS: Performed by: OTOLARYNGOLOGY

## 2021-12-27 PROCEDURE — 80053 COMPREHEN METABOLIC PANEL: CPT | Performed by: STUDENT IN AN ORGANIZED HEALTH CARE EDUCATION/TRAINING PROGRAM

## 2021-12-27 PROCEDURE — 27000221 HC OXYGEN, UP TO 24 HOURS

## 2021-12-27 PROCEDURE — D9220A PRA ANESTHESIA: Mod: ,,, | Performed by: ANESTHESIOLOGY

## 2021-12-27 PROCEDURE — 36000709 HC OR TIME LEV III EA ADD 15 MIN: Performed by: OTOLARYNGOLOGY

## 2021-12-27 PROCEDURE — 36430 TRANSFUSION BLD/BLD COMPNT: CPT

## 2021-12-27 RX ORDER — LIDOCAINE HYDROCHLORIDE 10 MG/ML
INJECTION, SOLUTION EPIDURAL; INFILTRATION; INTRACAUDAL; PERINEURAL
Status: DISCONTINUED | OUTPATIENT
Start: 2021-12-27 | End: 2021-12-27 | Stop reason: HOSPADM

## 2021-12-27 RX ORDER — HYDROMORPHONE HYDROCHLORIDE 1 MG/ML
0.2 INJECTION, SOLUTION INTRAMUSCULAR; INTRAVENOUS; SUBCUTANEOUS EVERY 4 HOURS PRN
Status: DISCONTINUED | OUTPATIENT
Start: 2021-12-27 | End: 2021-12-28

## 2021-12-27 RX ORDER — NEOSTIGMINE METHYLSULFATE 0.5 MG/ML
INJECTION, SOLUTION INTRAVENOUS
Status: DISCONTINUED | OUTPATIENT
Start: 2021-12-27 | End: 2021-12-27

## 2021-12-27 RX ORDER — PROPOFOL 10 MG/ML
VIAL (ML) INTRAVENOUS
Status: DISCONTINUED | OUTPATIENT
Start: 2021-12-27 | End: 2021-12-27

## 2021-12-27 RX ORDER — DEXAMETHASONE SODIUM PHOSPHATE 4 MG/ML
INJECTION, SOLUTION INTRA-ARTICULAR; INTRALESIONAL; INTRAMUSCULAR; INTRAVENOUS; SOFT TISSUE
Status: DISCONTINUED | OUTPATIENT
Start: 2021-12-27 | End: 2021-12-27

## 2021-12-27 RX ORDER — PHENYLEPHRINE HYDROCHLORIDE 10 MG/ML
INJECTION INTRAVENOUS
Status: DISCONTINUED | OUTPATIENT
Start: 2021-12-27 | End: 2021-12-27

## 2021-12-27 RX ORDER — ACETAMINOPHEN 10 MG/ML
1000 INJECTION, SOLUTION INTRAVENOUS EVERY 8 HOURS
Status: COMPLETED | OUTPATIENT
Start: 2021-12-27 | End: 2021-12-28

## 2021-12-27 RX ORDER — ACETAMINOPHEN 10 MG/ML
1000 INJECTION, SOLUTION INTRAVENOUS EVERY 8 HOURS
Status: DISCONTINUED | OUTPATIENT
Start: 2021-12-27 | End: 2021-12-27

## 2021-12-27 RX ORDER — PROPOFOL 10 MG/ML
INJECTION, EMULSION INTRAVENOUS
Status: DISCONTINUED
Start: 2021-12-27 | End: 2021-12-27 | Stop reason: WASHOUT

## 2021-12-27 RX ORDER — BISMUTH SUBSALICYLATE 525 MG/30ML
30 LIQUID ORAL
Status: DISPENSED | OUTPATIENT
Start: 2021-12-27 | End: 2021-12-27

## 2021-12-27 RX ORDER — FENTANYL CITRATE 50 UG/ML
INJECTION, SOLUTION INTRAMUSCULAR; INTRAVENOUS
Status: DISCONTINUED | OUTPATIENT
Start: 2021-12-27 | End: 2021-12-27

## 2021-12-27 RX ORDER — SUCCINYLCHOLINE CHLORIDE 20 MG/ML
INJECTION INTRAMUSCULAR; INTRAVENOUS
Status: DISCONTINUED | OUTPATIENT
Start: 2021-12-27 | End: 2021-12-27

## 2021-12-27 RX ORDER — CEFAZOLIN SODIUM 1 G/3ML
INJECTION, POWDER, FOR SOLUTION INTRAMUSCULAR; INTRAVENOUS
Status: DISCONTINUED | OUTPATIENT
Start: 2021-12-27 | End: 2021-12-27

## 2021-12-27 RX ORDER — ROCURONIUM BROMIDE 10 MG/ML
INJECTION, SOLUTION INTRAVENOUS
Status: DISCONTINUED | OUTPATIENT
Start: 2021-12-27 | End: 2021-12-27

## 2021-12-27 RX ORDER — LIDOCAINE HYDROCHLORIDE AND EPINEPHRINE 10; 10 MG/ML; UG/ML
INJECTION, SOLUTION INFILTRATION; PERINEURAL
Status: DISCONTINUED | OUTPATIENT
Start: 2021-12-27 | End: 2021-12-27 | Stop reason: HOSPADM

## 2021-12-27 RX ADMIN — PHENYLEPHRINE HYDROCHLORIDE 100 MCG: 10 INJECTION INTRAVENOUS at 12:12

## 2021-12-27 RX ADMIN — DEXAMETHASONE SODIUM PHOSPHATE 4 MG: 4 INJECTION, SOLUTION INTRAMUSCULAR; INTRAVENOUS at 11:12

## 2021-12-27 RX ADMIN — FENTANYL CITRATE 100 MCG: 50 INJECTION, SOLUTION INTRAMUSCULAR; INTRAVENOUS at 11:12

## 2021-12-27 RX ADMIN — MUPIROCIN: 20 OINTMENT TOPICAL at 10:12

## 2021-12-27 RX ADMIN — ROCURONIUM BROMIDE 50 MG: 10 INJECTION, SOLUTION INTRAVENOUS at 12:12

## 2021-12-27 RX ADMIN — MUPIROCIN: 20 OINTMENT TOPICAL at 08:12

## 2021-12-27 RX ADMIN — SUGAMMADEX 132 MG: 100 INJECTION, SOLUTION INTRAVENOUS at 01:12

## 2021-12-27 RX ADMIN — ACETAMINOPHEN 1000 MG: 10 INJECTION, SOLUTION INTRAVENOUS at 03:12

## 2021-12-27 RX ADMIN — MAGNESIUM SULFATE 2 G: 2 INJECTION INTRAVENOUS at 05:12

## 2021-12-27 RX ADMIN — GLYCOPYRROLATE 0.6 MG: 0.2 INJECTION, SOLUTION INTRAMUSCULAR; INTRAVITREAL at 01:12

## 2021-12-27 RX ADMIN — ENOXAPARIN SODIUM 40 MG: 40 INJECTION SUBCUTANEOUS at 05:12

## 2021-12-27 RX ADMIN — FAMOTIDINE 20 MG: 10 INJECTION, SOLUTION INTRAVENOUS at 08:12

## 2021-12-27 RX ADMIN — FENTANYL CITRATE 50 MCG: 50 INJECTION, SOLUTION INTRAMUSCULAR; INTRAVENOUS at 01:12

## 2021-12-27 RX ADMIN — NEOSTIGMINE METHYLSULFATE 5 MG: 0.5 INJECTION INTRAVENOUS at 01:12

## 2021-12-27 RX ADMIN — INSULIN ASPART 3 UNITS: 100 INJECTION, SOLUTION INTRAVENOUS; SUBCUTANEOUS at 10:12

## 2021-12-27 RX ADMIN — CEFAZOLIN 2 G: 330 INJECTION, POWDER, FOR SOLUTION INTRAMUSCULAR; INTRAVENOUS at 12:12

## 2021-12-27 RX ADMIN — FAMOTIDINE 20 MG: 10 INJECTION, SOLUTION INTRAVENOUS at 09:12

## 2021-12-27 RX ADMIN — SUCCINYLCHOLINE CHLORIDE 100 MG: 20 INJECTION, SOLUTION INTRAMUSCULAR; INTRAVENOUS at 11:12

## 2021-12-27 RX ADMIN — ACETAMINOPHEN 1000 MG: 10 INJECTION, SOLUTION INTRAVENOUS at 10:12

## 2021-12-27 RX ADMIN — PROPOFOL 150 MG: 10 INJECTION, EMULSION INTRAVENOUS at 11:12

## 2021-12-27 RX ADMIN — SODIUM CHLORIDE, SODIUM LACTATE, POTASSIUM CHLORIDE, AND CALCIUM CHLORIDE: .6; .31; .03; .02 INJECTION, SOLUTION INTRAVENOUS at 05:12

## 2021-12-27 NOTE — PROGRESS NOTES
[facility]  General Surgery  Progress Note    Subjective:     History of Present Illness:  No notes on file    Post-Op Info:  Procedure(s) (LRB):  CREATION, TRACHEOSTOMY (N/A)  LARYNGOSCOPY, DIRECT, WITH BRONCHOSCOPY (N/A)         Interval History: NAEON.  Plan for OR today with ENT.  Consent obtained for PEG vs. Open G tube    Medications:  Continuous Infusions:   dilTIAZem      lactated ringers 75 mL/hr at 12/27/21 0701     Scheduled Meds:   enoxaparin  40 mg Subcutaneous Daily    famotidine (PF)  20 mg Intravenous Q12H    mupirocin   Nasal BID    QUEtiapine  25 mg Oral QHS     PRN Meds:sodium chloride, sodium chloride 0.9%, calcium gluconate IVPB, calcium gluconate IVPB, calcium gluconate IVPB, dextrose 50%, glucagon (human recombinant), insulin aspart U-100, magnesium oxide, magnesium oxide, magnesium sulfate IVPB, magnesium sulfate IVPB, potassium bicarbonate, potassium bicarbonate, potassium bicarbonate, potassium chloride in water **AND** potassium chloride in water **AND** potassium chloride in water, sodium chloride 0.9%, sodium chloride 0.9%, tranexamic acid     Review of patient's allergies indicates:   Allergen Reactions    Pollen extracts     Lovastatin Rash     Not confirmed but pt skeptical     Objective:     Vital Signs (Most Recent):  Temp: 98.8 °F (37.1 °C) (12/27/21 0315)  Pulse: 96 (12/27/21 0701)  Resp: (!) 24 (12/27/21 0701)  BP: 121/61 (12/27/21 0630)  SpO2: 100 % (12/27/21 0701) Vital Signs (24h Range):  Temp:  [98.3 °F (36.8 °C)-99.6 °F (37.6 °C)] 98.8 °F (37.1 °C)  Pulse:  [] 96  Resp:  [14-48] 24  SpO2:  [90 %-100 %] 100 %  BP: (114-142)/(56-76) 121/61     Weight: 66 kg (145 lb 8.1 oz)  Body mass index is 23.48 kg/m².    Intake/Output - Last 3 Shifts       12/25 0700 12/26 0659 12/26 0700 12/27 0659 12/27 0700  12/28 0659    I.V. (mL/kg) 836.3 (12.6) 1462.3 (22.2) 98.3 (1.5)    Blood  471     Total Intake(mL/kg) 836.3 (12.6) 1933.3 (29.3) 98.3 (1.5)     Urine (mL/kg/hr) 400 (0.3) 1150 (0.7)     Other  550     Stool 0      Total Output 400 1700     Net +436.3 +233.3 +98.3           Urine Occurrence 1 x      Stool Occurrence 1 x            Physical Exam  Constitutional:       Appearance: Normal appearance.   HENT:      Head: Normocephalic.   Eyes:      Extraocular Movements: Extraocular movements intact.   Cardiovascular:      Rate and Rhythm: Normal rate and regular rhythm.      Pulses: Normal pulses.   Pulmonary:      Effort: Pulmonary effort is normal. No respiratory distress.   Abdominal:      General: There is no distension.      Palpations: Abdomen is soft.   Musculoskeletal:         General: Normal range of motion.   Skin:     General: Skin is warm and dry.   Neurological:      General: No focal deficit present.      Mental Status: He is alert and oriented to person, place, and time.   Psychiatric:         Mood and Affect: Mood normal.         Behavior: Behavior normal.         Significant Labs:  I have reviewed all pertinent lab results within the past 24 hours.  CBC:   Recent Labs   Lab 12/27/21  0244   WBC 8.88   RBC 2.62*   HGB 7.4*   HCT 22.4*      MCV 86   MCH 28.2   MCHC 33.0     BMP:   Recent Labs   Lab 12/27/21  0244   *   *   K 4.3      CO2 21*   BUN 25*   CREATININE 0.8   CALCIUM 8.3*   MG 1.9     CMP:   Recent Labs   Lab 12/27/21  0244   *   CALCIUM 8.3*   ALBUMIN 2.5*   PROT 4.7*   *   K 4.3   CO2 21*      BUN 25*   CREATININE 0.8   ALKPHOS 65   ALT 10   AST 20   BILITOT 3.7*       Significant Diagnostics:  I have reviewed all pertinent imaging results/findings within the past 24 hours.    Assessment/Plan:     * Larynx neoplasm malignant  Plan for PEG vs. Open G tube in OR today  Consent obtained  No further questions or concerns        Jules Kumar MD  General Surgery  [facility] - Surgical Intensive Care

## 2021-12-27 NOTE — PLAN OF CARE
[facility]  Initial Discharge Assessment       Primary Care Provider: Diana Calhoun MD    Admission Diagnosis: Hemoptysis [R04.2]    Admission Date: 12/25/2021  Expected Discharge Date:     Discharge Barriers Identified: None    Payor: HU[employer]NA MANAGED MEDICARE / Plan: HU[employer]NA MEDICARE PPO / Product Type: Medicare Advantage /     Extended Emergency Contact Information  Primary Emergency Contact: Janel Batres  Address: [address] [place] [address] [place] [address] [place]  Mobile Phone: [phone]  Relation: Spouse  Preferred language: English   needed? No    Discharge Plan A: Home  Discharge Plan B: Home with family      [facility] - [place], [place] - [address]  [address] [address]  Phone: [phone] Fax: [phone]    [id number] - [place] - [address]  [address] [place] [address]  Phone: [phone] Fax: [phone]      Initial Assessment (most recent)     Adult Discharge Assessment - 12/27/21 0934        Discharge Assessment    Assessment Type Discharge Planning Assessment     Confirmed/corrected address, phone number and insurance Yes     Confirmed Demographics Correct on Facesheet     Source of Information patient     When was your last doctors appointment? 12/15/21     Reason For Admission Hemoptysis     Lives With spouse     Facility Arrived From: [facility]     Do you expect to return to your current living situation? Yes     Do you have help at home or someone to help you manage your care at home? Yes     Who are your caregiver(s) and their phone number(s)? Janel Batres [phone]     Home Layout Other (see comments)   The patient reported that his home only has one step to enter the house.    Equipment Currently Used at Home none     Readmission within 30 days? No     Patient currently being followed by outpatient case management? No     Do  you currently have service(s) that help you manage your care at home? No     Do you take prescription medications? Yes     Is the patient taking medications as prescribed? yes     Who is going to help you get home at discharge? Janel Batres- Wife     Are you on dialysis? No     Do you take coumadin? No     Discharge Plan A Home     Discharge Plan B Home with family     DME Needed Upon Discharge  other (see comments)   TBD    Discharge Plan discussed with: Patient     Discharge Barriers Identified None        Relationship/Environment    Name(s) of Who Lives With Patient Janel Batres - Wife                  The SW with patient in room 87500 to complete DPA. Questions answered / contact numbers provided. The SW provided the patient with a discharge planning booklet. Use [facility]REFERR[facility] PHARMACY [facility] BEDSIDE DELIVERY for any necessary medications at time of discharge. The patient is independent with all ADLs - does not use DME, In-home equipment, is not on HD, BTs or home oxygen. The patient's wife ( Janel Batres) will be assisting with help upon discharge. The patient's wife will be providing transportation home. Hospital follow up will be scheduled with PCP. Will continue to follow for course of hospitalization.     Ronan Aparicio LMSW  Case Management [facility]  Ext: [phone]

## 2021-12-27 NOTE — PROGRESS NOTES
[facility]  Critical Care - Surgery  Progress Note    Patient Name: Cyrus Batres Jr.  MRN: [id number]  Admission Date: 12/25/2021  Hospital Length of Stay: 2 days  Code Status: Full Code  Attending Provider: Flex Espinosa MD  Primary Care Provider: Diana Calhoun MD   Principal Problem: Larynx neoplasm malignant    Subjective:       Interval History/Significant Events: NAEON  200 output via hemoptysis over last 24 hours  Hgb 5.8 overnight, s/p 2u pRBC with great response  Remains HDS without pressors  Plan for trach/PEG today  Renal fxn stable.    Follow-up For: Procedure(s) (LRB):  CREATION, TRACHEOSTOMY (N/A)  LARYNGOSCOPY, DIRECT, WITH BRONCHOSCOPY (N/A)    Post-Operative Day:      Objective:     Vital Signs (Most Recent):  Temp: 98.8 °F (37.1 °C) (12/27/21 0315)  Pulse: 101 (12/27/21 0600)  Resp: (!) 23 (12/27/21 0600)  BP: 125/65 (12/27/21 0600)  SpO2: 98 % (12/27/21 0600) Vital Signs (24h Range):  Temp:  [98.3 °F (36.8 °C)-99.6 °F (37.6 °C)] 98.8 °F (37.1 °C)  Pulse:  [] 101  Resp:  [14-48] 23  SpO2:  [90 %-100 %] 98 %  BP: (114-142)/(56-76) 125/65     Weight: 66 kg (145 lb 8.1 oz)  Body mass index is 23.48 kg/m².      Intake/Output Summary (Last 24 hours) at 12/27/2021 0622  Last data filed at 12/27/2021 0600  Gross per 24 hour   Intake 1933.28 ml   Output 1700 ml   Net 233.28 ml       Physical Exam    Vents:  Oxygen Concentration (%): 28 (12/26/21 1934)    Lines/Drains/Airways     Peripheral Intravenous Line                 Peripheral IV - Single Lumen 12/26/21 0800 20 G Left;Posterior Forearm <1 day         Peripheral IV - Single Lumen 12/26/21 0937 20 G Anterior;Right Upper Arm <1 day                Significant Labs:    CBC/Anemia Profile:  Recent Labs   Lab 12/26/21 2049 12/26/21 2129 12/27/21  0244   WBC 7.83 7.28 8.88   HGB 5.9* 5.8* 7.4*   HCT 18.2* 17.1* 22.4*    233 216   MCV 88 86 86   RDW 12.4 12.4 13.2        Chemistries:  Recent Labs   Lab 12/26/21  8780  12/27/21  0244   * 135*   K 4.3 4.3    105   CO2 22* 21*   BUN 26* 25*   CREATININE 1.2 0.8   CALCIUM 8.6* 8.3*   ALBUMIN 3.0* 2.5*   PROT 5.4* 4.7*   BILITOT 1.2* 3.7*   ALKPHOS 79 65   ALT 13 10   AST 16 20   MG 1.7 1.9   PHOS 2.4* 2.2*       All pertinent labs within the past 24 hours have been reviewed.    Significant Imaging:  I have reviewed all pertinent imaging results/findings within the past 24 hours.    Assessment/Plan:     Hemoptysis  72yo M w h/o Afib, HTN, DM (diet controlled), and laryngeal ca. Presents w hemoptysis. ENT admits to SICU for airway watch.      Neuro/Psych:   -- Sedation: seroquel  -- Pain: no pain               Cards:   -- HDS  -- Maintain MAP > 65  -- dilt gtt as NPO  -- Will hold on home losartan given possibility of intubation w sedation and change in when patient may go to OR      Pulm:   -- Goal O2 sat > 90%  -- Nebulized TXA prn for hemoptysis  -- Intubatable with a a 6-0 ETT should bleeding begin to compromise his airway. Call anesthesia for intubation as tumor increases complexity of intubation. Consider spontaneous breathing fiberoptic intubation      Renal:  -- Will hold off on mahan for now. Should clinical condition worsen, will place for strict I/Os  -- BUN/Cr baseline  -- LR @ 75      FEN / GI:   -- Replace lytes as needed  -- Nutrition: NPO pending procedure   -- mIVF while NPO      ID:   -- Tm: afebrile; WBC wnl  -- Abx none      Heme/Onc:   -- H/H dropped overnight to 5.8, s/p 2 units  -- q12 CBC  -- Transfuse to maintain hgb >7  -- Continue to hold home eliquis for now  -- prophylactic      Endo:   -- Gluc goal 140-180  -- ISS      PPx:   Feeding: NPO  Analgesia/Sedation: none  Thromboembolic prevention: lovenox  HOB >30: y  Stress Ulcer ppx: famotidine  Glucose control: Critical care goal 140-180 g/dl, ISS    Lines/Drains/Airway: PIV x2      Dispo/Code Status/Palliative:   -- SICU / Full Code   -- discussed with SICU staff             Critical  care was time spent personally by me on the following activities: development of treatment plan with patient or surrogate and bedside caregivers, discussions with consultants, evaluation of patient's response to treatment, examination of patient, ordering and performing treatments and interventions, ordering and review of laboratory studies, ordering and review of radiographic studies, pulse oximetry, re-evaluation of patient's condition.  This critical care time did not overlap with that of any other provider or involve time for any procedures.     Parmjit Lozada MD  Critical Care - Surgery  [facility]

## 2021-12-27 NOTE — SUBJECTIVE & OBJECTIVE
Interval History: NAEON.  Plan for OR today with ENT.  Consent obtained for PEG vs. Open G tube    Medications:  Continuous Infusions:   dilTIAZem      lactated ringers 75 mL/hr at 12/27/21 0701     Scheduled Meds:   enoxaparin  40 mg Subcutaneous Daily    famotidine (PF)  20 mg Intravenous Q12H    mupirocin   Nasal BID    QUEtiapine  25 mg Oral QHS     PRN Meds:sodium chloride, sodium chloride 0.9%, calcium gluconate IVPB, calcium gluconate IVPB, calcium gluconate IVPB, dextrose 50%, glucagon (human recombinant), insulin aspart U-100, magnesium oxide, magnesium oxide, magnesium sulfate IVPB, magnesium sulfate IVPB, potassium bicarbonate, potassium bicarbonate, potassium bicarbonate, potassium chloride in water **AND** potassium chloride in water **AND** potassium chloride in water, sodium chloride 0.9%, sodium chloride 0.9%, tranexamic acid     Review of patient's allergies indicates:   Allergen Reactions    Pollen extracts     Lovastatin Rash     Not confirmed but pt skeptical     Objective:     Vital Signs (Most Recent):  Temp: 98.8 °F (37.1 °C) (12/27/21 0315)  Pulse: 96 (12/27/21 0701)  Resp: (!) 24 (12/27/21 0701)  BP: 121/61 (12/27/21 0630)  SpO2: 100 % (12/27/21 0701) Vital Signs (24h Range):  Temp:  [98.3 °F (36.8 °C)-99.6 °F (37.6 °C)] 98.8 °F (37.1 °C)  Pulse:  [] 96  Resp:  [14-48] 24  SpO2:  [90 %-100 %] 100 %  BP: (114-142)/(56-76) 121/61     Weight: 66 kg (145 lb 8.1 oz)  Body mass index is 23.48 kg/m².    Intake/Output - Last 3 Shifts       12/25 0700 12/26 0659 12/26 0700 12/27 0659 12/27 0700 12/28 0659    I.V. (mL/kg) 836.3 (12.6) 1462.3 (22.2) 98.3 (1.5)    Blood  471     Total Intake(mL/kg) 836.3 (12.6) 1933.3 (29.3) 98.3 (1.5)    Urine (mL/kg/hr) 400 (0.3) 1150 (0.7)     Other  550     Stool 0      Total Output 400 1700     Net +436.3 +233.3 +98.3           Urine Occurrence 1 x      Stool Occurrence 1 x            Physical Exam  Constitutional:       Appearance: Normal  appearance.   HENT:      Head: Normocephalic.   Eyes:      Extraocular Movements: Extraocular movements intact.   Cardiovascular:      Rate and Rhythm: Normal rate and regular rhythm.      Pulses: Normal pulses.   Pulmonary:      Effort: Pulmonary effort is normal. No respiratory distress.   Abdominal:      General: There is no distension.      Palpations: Abdomen is soft.   Musculoskeletal:         General: Normal range of motion.   Skin:     General: Skin is warm and dry.   Neurological:      General: No focal deficit present.      Mental Status: He is alert and oriented to person, place, and time.   Psychiatric:         Mood and Affect: Mood normal.         Behavior: Behavior normal.         Significant Labs:  I have reviewed all pertinent lab results within the past 24 hours.  CBC:   Recent Labs   Lab 12/27/21 0244   WBC 8.88   RBC 2.62*   HGB 7.4*   HCT 22.4*      MCV 86   MCH 28.2   MCHC 33.0     BMP:   Recent Labs   Lab 12/27/21 0244   *   *   K 4.3      CO2 21*   BUN 25*   CREATININE 0.8   CALCIUM 8.3*   MG 1.9     CMP:   Recent Labs   Lab 12/27/21  0244   *   CALCIUM 8.3*   ALBUMIN 2.5*   PROT 4.7*   *   K 4.3   CO2 21*      BUN 25*   CREATININE 0.8   ALKPHOS 65   ALT 10   AST 20   BILITOT 3.7*       Significant Diagnostics:  I have reviewed all pertinent imaging results/findings within the past 24 hours.

## 2021-12-27 NOTE — ASSESSMENT & PLAN NOTE
72yo M w h/o Afib, HTN, DM (diet controlled), and laryngeal ca. Presents w hemoptysis. ENT admits to SICU for airway watch.      Neuro/Psych:   -- Sedation: seroquel  -- Pain: no pain               Cards:   -- HDS  -- Maintain MAP > 65  -- dilt gtt as NPO  -- Will hold on home losartan given possibility of intubation w sedation and change in when patient may go to OR      Pulm:   -- Goal O2 sat > 90%  -- Nebulized TXA prn for hemoptysis  -- Intubatable with a a 6-0 ETT should bleeding begin to compromise his airway. Call anesthesia for intubation as tumor increases complexity of intubation. Consider spontaneous breathing fiberoptic intubation      Renal:  -- Will hold off on mahan for now. Should clinical condition worsen, will place for strict I/Os  -- BUN/Cr baseline  -- LR @ 75      FEN / GI:   -- Replace lytes as needed  -- Nutrition: NPO pending procedure   -- mIVF while NPO      ID:   -- Tm: afebrile; WBC wnl  -- Abx none      Heme/Onc:   -- H/H dropped overnight to 5.8, s/p 2 units  -- q12 CBC  -- Transfuse to maintain hgb >7  -- Continue to hold home eliquis for now  -- prophylactic      Endo:   -- Gluc goal 140-180  -- ISS      PPx:   Feeding: NPO  Analgesia/Sedation: none  Thromboembolic prevention: lovenox  HOB >30: y  Stress Ulcer ppx: famotidine  Glucose control: Critical care goal 140-180 g/dl, ISS    Lines/Drains/Airway: PIV x2      Dispo/Code Status/Palliative:   -- SICU / Full Code   -- discussed with SICU staff

## 2021-12-27 NOTE — NURSING
H/H 5.8/17.1, after labs redrawn from different site, with adequate waste. Pt remains asymptomatic/VSS.  Dr. Weber notified. Type and screen sent off. Orders placed for 2U PRBC.

## 2021-12-27 NOTE — PT/OT/SLP PROGRESS
Speech Language Pathology  Pt not seen      Cyrus Batres Jr.  MRN: [id number]    Patient not seen today secondary to pt NPO for scheduled trach/PEG procedure.  12/27/2021

## 2021-12-27 NOTE — SUBJECTIVE & OBJECTIVE
Interval History: 200 cc hemoptysis over last 24h. Received 2U pRBCs overnight after Hgb drop to 5.8 with appropriate response. Patient states he had a great night.     Medications:  Continuous Infusions:   dilTIAZem      lactated ringers 75 mL/hr at 12/27/21 0600     Scheduled Meds:   enoxaparin  40 mg Subcutaneous Daily    famotidine (PF)  20 mg Intravenous Q12H    mupirocin   Nasal BID    QUEtiapine  25 mg Oral QHS     PRN Meds:sodium chloride, sodium chloride 0.9%, calcium gluconate IVPB, calcium gluconate IVPB, calcium gluconate IVPB, dextrose 50%, glucagon (human recombinant), insulin aspart U-100, magnesium oxide, magnesium oxide, magnesium sulfate IVPB, magnesium sulfate IVPB, potassium bicarbonate, potassium bicarbonate, potassium bicarbonate, potassium chloride in water **AND** potassium chloride in water **AND** potassium chloride in water, sodium chloride 0.9%, sodium chloride 0.9%, tranexamic acid     Review of patient's allergies indicates:   Allergen Reactions    Pollen extracts     Lovastatin Rash     Not confirmed but pt skeptical     Objective:     Vital Signs (24h Range):  Temp:  [98.3 °F (36.8 °C)-99.6 °F (37.6 °C)] 98.8 °F (37.1 °C)  Pulse:  [] 101  Resp:  [14-48] 20  SpO2:  [90 %-100 %] 99 %  BP: (114-142)/(56-76) 121/61       Lines/Drains/Airways     Peripheral Intravenous Line                 Peripheral IV - Single Lumen 12/26/21 0800 20 G Left;Posterior Forearm <1 day         Peripheral IV - Single Lumen 12/26/21 0937 20 G Anterior;Right Upper Arm <1 day                Physical Exam    NAD, conversant  EOMi, PERRL   OC/OP wnl, dried blood around mouth, no blood in oropharynx, FOM soft  Post-radiation fibrosis of the neck  No overlying skin changes or ecchymosis  Normal WOB      Significant Labs:  CBC:   Recent Labs   Lab 12/27/21  0244   WBC 8.88   RBC 2.62*   HGB 7.4*   HCT 22.4*      MCV 86   MCH 28.2   MCHC 33.0     CMP:   Recent Labs   Lab 12/27/21  0244   *    CALCIUM 8.3*   ALBUMIN 2.5*   PROT 4.7*   *   K 4.3   CO2 21*      BUN 25*   CREATININE 0.8   ALKPHOS 65   ALT 10   AST 20   BILITOT 3.7*       Significant Diagnostics:  I have reviewed and interpreted all pertinent imaging results/findings within the past 24 hours.

## 2021-12-27 NOTE — PLAN OF CARE
Routine Post-Op PEG Care:   · Please keep tube to gravity for the next 24 hours.  · May administer meds after 6 hours and clamp for 30 minutes after medication administration.  · We will give further recs about when to restart tube feeds.   · Please notify General Surgery with any significant changes in patient's vital signs within the first 24 hours after PEG placement.  · Please call with questions about PEG tube.     Miya Lopez MD  PGY- 5 General Surgery   [phone]

## 2021-12-27 NOTE — NURSING TRANSFER
Nursing Transfer Note    Pt transferred to OR by anesthesia. Connected to portable monitor. Consents signed and in chart. CHG bath performed.

## 2021-12-27 NOTE — ASSESSMENT & PLAN NOTE
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     Discussed in detail he and his wife his situation and the options prior to his laryngectomy which include intubation, tracheostomy creation or conservative measures. He is unlikely to be able to tolerate conservative measures alone due to frequent stimulation of his larynx from the bleeding. After a long discussion, they agreed to proceed with placement of a tracheostomy tube prior to his definitive laryngectomy.     - Will plan for trach/g-tube this afternoon with Dr Espinosa and Gen surgery  - consent obtained this AM  - Keep NPO, mIVF  - ICU for airway observation  - Nebulized TXA q4h prn  - Continue to hold home eliquis for now, ok with subq heparin  - Home meds restarted otherwise  - Intubatable with a a 6-0 ETT should bleeding begin to compromise his airway  - q12h H/H, Daily labs  - Continue ICU care  - Please call or page ENT with any concerns

## 2021-12-27 NOTE — OP NOTE
DATE OF PROCEDURE: 12/27/2021    PREOPERATIVE DIAGNOSES:   1. Laryngeal SCC  2. Dysphagia.    POSTOPERATIVE DIAGNOSES:   1. Laryngeal SCC   2. Dysphagia.     PROCEDURES PERFORMED:   1. Esophagogastroduodenoscopy  2. Percutaneous endoscopic gastrostomy.    ATTENDING SURGEON: Flex Taveras M.D.     HOUSESTAFF SURGEON: Abril Chen MD    : Luisa Lopez MD    ANESTHESIA: General     ESTIMATED BLOOD LOSS: 2 mL     FINDINGS: 20-Frisian percutaneous gastrostomy @ 4.5cm placed without apparent complication.     SPECIMEN: None.     DRAINS: None.     COMPLICATIONS: None.     INDICATIONS: Cyrus Batres Jr. is a 71 y.o.male referred to [facility] for laryngeal SCC. The patient is expected to have prolonged dysphagia as a result and General Surgery was consulted for placement of a percutaneous gastrostomy tube. We did obtain informed consent from the patient who expressed understanding of the risks and benefits and gave consent to proceed.     OPERATIVE PROCEDURE: The patient was identified in preoperative holding and brought back to the operating room. Anesthesia was induced. A timeout procedure was performed and all team members present agreed this was the correct procedure on the correct patient.    We then directed our attention to the left upper quadrant for gastrostomy tube placement. The patient's abdomen was prepped and draped. An upper endoscope was introduced into the oropharynx and guided down into the esophagus and stomach. The stomach was insufflated with air. We intubated the duodenum and examined the first and second portions; no abnormalities were noted. We withdrew the endoscope into the stomach and identified an appropriate position for gastrostomy tube placement 2 finger-breadths below the left subcostal margin. Palpation of the anterior abdominal wall at this point was visualized endoscopically and transillumination from the endoscope was visualized through the anterior  abdominal wall. We made a 1.5 cm transverse skin incision. At this point, the stomach was cannulated with a catheter loaded on a needle. This was grasped by a snare which had been passed through the endoscope. The needle was removed, and a guidewire was placed, and the snare was used to grasp the guidewire. The endoscope, snare, and guidewire were all withdrawn from the patient's mouth. A 20-Maori gastrostomy tube was loaded onto the guidewire and pulled through the anterior abdominal wall via Seldinger technique. Repeat endoscopy was performed with the gastrostomy tube at the 4.5 cm camilo at the skin. There was no blanching of the gastric mucosa, and when the tube was twisted, the button did not grab the mucosa. The insufflation in the stomach was evacuated, and the endoscope was removed. The gastrostomy tube was secured in place using the supplied devices and connected to a bag for gravity drainage.    The case was then handed over to the ENT team for tracheostomy creation.    Dr. Taveras was present for and directed or performed the entire procedure. All sponge, instrument, and needle counts were correct at the termination of the PEG procedure.

## 2021-12-27 NOTE — NURSING
SICU PLAN OF CARE NOTE    Dx: Larynx neoplasm malignant    Shift Events: pt responded well to the 2U PRBC given for drop in H/H. 350cc of hemoptysis, mary alice red blood. Plans remain to go to the OR for trach/PEG, consents signed, Pt NPO. Pt and spouse updated on plan of care/ questions addressed and answered.     Neuro: AAO x4, Follows Commands and Moves All Extremities  Respiratory: 2L NC  Gtts: MIVF @ 75cc/hr.  Urine Output: Voids Spontaneously 700 cc/shift   Skin: No new pressure or device injuries noted. All dressings remain clean, dry, and intact. Pt encouraged to reposition frequently.

## 2021-12-27 NOTE — PROGRESS NOTES
[facility]  Otorhinolaryngology-Head & Neck Surgery  Progress Note    Subjective:     Post-Op Info:  Procedure(s) (LRB):  CREATION, TRACHEOSTOMY (N/A)  LARYNGOSCOPY, DIRECT, WITH BRONCHOSCOPY (N/A)      Hospital Day: 3     Interval History: 200 cc hemoptysis over last 24h. Received 2U pRBCs overnight after Hgb drop to 5.8 with appropriate response. Patient states he had a great night.     Medications:  Continuous Infusions:   dilTIAZem      lactated ringers 75 mL/hr at 12/27/21 0600     Scheduled Meds:   enoxaparin  40 mg Subcutaneous Daily    famotidine (PF)  20 mg Intravenous Q12H    mupirocin   Nasal BID    QUEtiapine  25 mg Oral QHS     PRN Meds:sodium chloride, sodium chloride 0.9%, calcium gluconate IVPB, calcium gluconate IVPB, calcium gluconate IVPB, dextrose 50%, glucagon (human recombinant), insulin aspart U-100, magnesium oxide, magnesium oxide, magnesium sulfate IVPB, magnesium sulfate IVPB, potassium bicarbonate, potassium bicarbonate, potassium bicarbonate, potassium chloride in water **AND** potassium chloride in water **AND** potassium chloride in water, sodium chloride 0.9%, sodium chloride 0.9%, tranexamic acid     Review of patient's allergies indicates:   Allergen Reactions    Pollen extracts     Lovastatin Rash     Not confirmed but pt skeptical     Objective:     Vital Signs (24h Range):  Temp:  [98.3 °F (36.8 °C)-99.6 °F (37.6 °C)] 98.8 °F (37.1 °C)  Pulse:  [] 101  Resp:  [14-48] 20  SpO2:  [90 %-100 %] 99 %  BP: (114-142)/(56-76) 121/61       Lines/Drains/Airways     Peripheral Intravenous Line                 Peripheral IV - Single Lumen 12/26/21 0800 20 G Left;Posterior Forearm <1 day         Peripheral IV - Single Lumen 12/26/21 0937 20 G Anterior;Right Upper Arm <1 day                Physical Exam    NAD, conversant  EOMi, PERRL   OC/OP wnl, dried blood around mouth, no blood in oropharynx, FOM soft  Post-radiation fibrosis of the neck  No overlying  skin changes or ecchymosis  Normal WOB      Significant Labs:  CBC:   Recent Labs   Lab 12/27/21  0244   WBC 8.88   RBC 2.62*   HGB 7.4*   HCT 22.4*      MCV 86   MCH 28.2   MCHC 33.0     CMP:   Recent Labs   Lab 12/27/21  0244   *   CALCIUM 8.3*   ALBUMIN 2.5*   PROT 4.7*   *   K 4.3   CO2 21*      BUN 25*   CREATININE 0.8   ALKPHOS 65   ALT 10   AST 20   BILITOT 3.7*       Significant Diagnostics:  I have reviewed and interpreted all pertinent imaging results/findings within the past 24 hours.    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     Discussed in detail he and his wife his situation and the options prior to his laryngectomy which include intubation, tracheostomy creation or conservative measures. He is unlikely to be able to tolerate conservative measures alone due to frequent stimulation of his larynx from the bleeding. After a long discussion, they agreed to proceed with placement of a tracheostomy tube prior to his definitive laryngectomy.     - Will plan for trach/g-tube this afternoon with Dr Espinosa and Gen surgery  - consent obtained this AM  - Keep NPO, mIVF  - ICU for airway observation  - Nebulized TXA q4h prn  - Continue to hold home eliquis for now, ok with subq heparin  - Home meds restarted otherwise  - Intubatable with a a 6-0 ETT should bleeding begin to compromise his airway  - q12h H/H, Daily labs  - Continue ICU care  - Please call or page ENT with any concerns        Edi Crespo MD  Otorhinolaryngology-Head & Neck Surgery  [facility]

## 2021-12-27 NOTE — CARE UPDATE
"      SICU PLAN OF CARE NOTE    Dx: Larynx neoplasm malignant; Hemoptysis    Shift Events: No acute event throughout the shift today. Patient did not pass a nursing bedside swallow-- made NPO and SLP consulted. Plans for Tracheostomy and G-tube placement tomorrow 12/27/21 with ENT and General surgery. ~ 200 cc of hemoptysis today-SICU team aware. Spiritual care services provider per  today.    Goals of Care: MAP > 65, SpO2 > 90%, Monitor airway    Neuro: AAO x4, Follows Commands and Moves All Extremities    Vital Signs: BP (!) 128/58   Pulse 101   Temp 98.9 °F (37.2 °C) (Oral)   Resp (!) 25   Ht 5' 6" (1.676 m)   Wt 66.6 kg (146 lb 13.2 oz)   SpO2 99%   BMI 23.70 kg/m²     Respiratory: 2L Nasal Cannula    Diet: NPO    Gtts: MIVF    Urine Output: Voids Spontaneously 450 cc/shift    Drains: None     Labs/Accuchecks: Q12H CBC, Daily Labs / Accuchecks Q6H    Skin: No signs of skin breakdown or redness noted over bony prominences. Patient able to reposition himself independently in the bed and weight shift assistance provided when needed. Sacral and heel foam dressings in place.Waffle mattress in place and inflated. Danish bed plugged in and working.     "

## 2021-12-27 NOTE — PLAN OF CARE
Problem: Adult Inpatient Plan of Care  Goal: Plan of Care Review  Outcome: Ongoing, Progressing   No acute events throughout the shift. VSS. Tracheostomy performed this shift, pt currently on 8L 35% FiO2 trach collar. Gtts: LR at 75cc/hr. Peg tube placed and currently to gravity. Pain controlled with prn pain medications. POCT glucose monitored. No sliding scale coverage needed. See flowsheets for assessments and intake and output. Plan of care reviewed with patient and patients spouse. All questions and concerns addressed.

## 2021-12-27 NOTE — SUBJECTIVE & OBJECTIVE
Interval History/Significant Events: NAEON  200 output via hemoptysis over last 24 hours  Hgb 5.8 overnight, s/p 2u pRBC with great response  Remains HDS without pressors  Plan for trach/PEG today  Renal fxn stable.    Follow-up For: Procedure(s) (LRB):  CREATION, TRACHEOSTOMY (N/A)  LARYNGOSCOPY, DIRECT, WITH BRONCHOSCOPY (N/A)    Post-Operative Day:      Objective:     Vital Signs (Most Recent):  Temp: 98.8 °F (37.1 °C) (12/27/21 0315)  Pulse: 101 (12/27/21 0600)  Resp: (!) 23 (12/27/21 0600)  BP: 125/65 (12/27/21 0600)  SpO2: 98 % (12/27/21 0600) Vital Signs (24h Range):  Temp:  [98.3 °F (36.8 °C)-99.6 °F (37.6 °C)] 98.8 °F (37.1 °C)  Pulse:  [] 101  Resp:  [14-48] 23  SpO2:  [90 %-100 %] 98 %  BP: (114-142)/(56-76) 125/65     Weight: 66 kg (145 lb 8.1 oz)  Body mass index is 23.48 kg/m².      Intake/Output Summary (Last 24 hours) at 12/27/2021 0622  Last data filed at 12/27/2021 0600  Gross per 24 hour   Intake 1933.28 ml   Output 1700 ml   Net 233.28 ml       Physical Exam    Vents:  Oxygen Concentration (%): 28 (12/26/21 1934)    Lines/Drains/Airways     Peripheral Intravenous Line                 Peripheral IV - Single Lumen 12/26/21 0800 20 G Left;Posterior Forearm <1 day         Peripheral IV - Single Lumen 12/26/21 0937 20 G Anterior;Right Upper Arm <1 day                Significant Labs:    CBC/Anemia Profile:  Recent Labs   Lab 12/26/21 2049 12/26/21 2129 12/27/21  0244   WBC 7.83 7.28 8.88   HGB 5.9* 5.8* 7.4*   HCT 18.2* 17.1* 22.4*    233 216   MCV 88 86 86   RDW 12.4 12.4 13.2        Chemistries:  Recent Labs   Lab 12/26/21  0310 12/27/21  0244   * 135*   K 4.3 4.3    105   CO2 22* 21*   BUN 26* 25*   CREATININE 1.2 0.8   CALCIUM 8.6* 8.3*   ALBUMIN 3.0* 2.5*   PROT 5.4* 4.7*   BILITOT 1.2* 3.7*   ALKPHOS 79 65   ALT 13 10   AST 16 20   MG 1.7 1.9   PHOS 2.4* 2.2*       All pertinent labs within the past 24 hours have been reviewed.    Significant Imaging:  I have reviewed  all pertinent imaging results/findings within the past 24 hours.

## 2021-12-28 LAB
ALBUMIN SERPL BCP-MCNC: 2.3 G/DL (ref 3.5–5.2)
ALP SERPL-CCNC: 70 U/L (ref 55–135)
ALT SERPL W/O P-5'-P-CCNC: 11 U/L (ref 10–44)
ANION GAP SERPL CALC-SCNC: 8 MMOL/L (ref 8–16)
AST SERPL-CCNC: 18 U/L (ref 10–40)
BASOPHILS # BLD AUTO: 0 K/UL (ref 0–0.2)
BASOPHILS NFR BLD: 0 % (ref 0–1.9)
BILIRUB SERPL-MCNC: 1.8 MG/DL (ref 0.1–1)
BLD PROD TYP BPU: NORMAL
BLD PROD TYP BPU: NORMAL
BLOOD UNIT EXPIRATION DATE: NORMAL
BLOOD UNIT EXPIRATION DATE: NORMAL
BLOOD UNIT TYPE CODE: 7300
BLOOD UNIT TYPE CODE: 7300
BLOOD UNIT TYPE: NORMAL
BLOOD UNIT TYPE: NORMAL
BUN SERPL-MCNC: 25 MG/DL (ref 8–23)
CALCIUM SERPL-MCNC: 8.1 MG/DL (ref 8.7–10.5)
CHLORIDE SERPL-SCNC: 105 MMOL/L (ref 95–110)
CO2 SERPL-SCNC: 21 MMOL/L (ref 23–29)
CODING SYSTEM: NORMAL
CODING SYSTEM: NORMAL
CREAT SERPL-MCNC: 0.8 MG/DL (ref 0.5–1.4)
DIFFERENTIAL METHOD: ABNORMAL
DISPENSE STATUS: NORMAL
DISPENSE STATUS: NORMAL
EOSINOPHIL # BLD AUTO: 0 K/UL (ref 0–0.5)
EOSINOPHIL NFR BLD: 0 % (ref 0–8)
ERYTHROCYTE [DISTWIDTH] IN BLOOD BY AUTOMATED COUNT: 13.7 % (ref 11.5–14.5)
ERYTHROCYTE [DISTWIDTH] IN BLOOD BY AUTOMATED COUNT: 13.9 % (ref 11.5–14.5)
EST. GFR  (AFRICAN AMERICAN): >60 ML/MIN/1.73 M^2
EST. GFR  (NON AFRICAN AMERICAN): >60 ML/MIN/1.73 M^2
GLUCOSE SERPL-MCNC: 183 MG/DL (ref 70–110)
HCT VFR BLD AUTO: 28.3 % (ref 40–54)
HCT VFR BLD AUTO: 33.8 % (ref 40–54)
HGB BLD-MCNC: 11.1 G/DL (ref 14–18)
HGB BLD-MCNC: 9.6 G/DL (ref 14–18)
IMM GRANULOCYTES # BLD AUTO: 0.06 K/UL (ref 0–0.04)
IMM GRANULOCYTES NFR BLD AUTO: 0.6 % (ref 0–0.5)
LYMPHOCYTES # BLD AUTO: 0.3 K/UL (ref 1–4.8)
LYMPHOCYTES NFR BLD: 3.3 % (ref 18–48)
MAGNESIUM SERPL-MCNC: 2 MG/DL (ref 1.6–2.6)
MCH RBC QN AUTO: 28.1 PG (ref 27–31)
MCH RBC QN AUTO: 28.4 PG (ref 27–31)
MCHC RBC AUTO-ENTMCNC: 32.8 G/DL (ref 32–36)
MCHC RBC AUTO-ENTMCNC: 33.9 G/DL (ref 32–36)
MCV RBC AUTO: 84 FL (ref 82–98)
MCV RBC AUTO: 86 FL (ref 82–98)
MONOCYTES # BLD AUTO: 0.4 K/UL (ref 0.3–1)
MONOCYTES NFR BLD: 4.2 % (ref 4–15)
NEUTROPHILS # BLD AUTO: 8.7 K/UL (ref 1.8–7.7)
NEUTROPHILS NFR BLD: 91.9 % (ref 38–73)
NRBC BLD-RTO: 0 /100 WBC
NUM UNITS TRANS PACKED RBC: NORMAL
NUM UNITS TRANS PACKED RBC: NORMAL
PHOSPHATE SERPL-MCNC: 3 MG/DL (ref 2.7–4.5)
PLATELET # BLD AUTO: 228 K/UL (ref 150–450)
PLATELET # BLD AUTO: 242 K/UL (ref 150–450)
PMV BLD AUTO: 10.2 FL (ref 9.2–12.9)
PMV BLD AUTO: 10.5 FL (ref 9.2–12.9)
POCT GLUCOSE: 141 MG/DL (ref 70–110)
POCT GLUCOSE: 175 MG/DL (ref 70–110)
POCT GLUCOSE: 209 MG/DL (ref 70–110)
POTASSIUM SERPL-SCNC: 4.6 MMOL/L (ref 3.5–5.1)
PROT SERPL-MCNC: 4.8 G/DL (ref 6–8.4)
RBC # BLD AUTO: 3.38 M/UL (ref 4.6–6.2)
RBC # BLD AUTO: 3.95 M/UL (ref 4.6–6.2)
SODIUM SERPL-SCNC: 134 MMOL/L (ref 136–145)
WBC # BLD AUTO: 10.3 K/UL (ref 3.9–12.7)
WBC # BLD AUTO: 9.46 K/UL (ref 3.9–12.7)

## 2021-12-28 PROCEDURE — 85027 COMPLETE CBC AUTOMATED: CPT | Performed by: STUDENT IN AN ORGANIZED HEALTH CARE EDUCATION/TRAINING PROGRAM

## 2021-12-28 PROCEDURE — 80053 COMPREHEN METABOLIC PANEL: CPT | Performed by: STUDENT IN AN ORGANIZED HEALTH CARE EDUCATION/TRAINING PROGRAM

## 2021-12-28 PROCEDURE — 63600175 PHARM REV CODE 636 W HCPCS

## 2021-12-28 PROCEDURE — 83735 ASSAY OF MAGNESIUM: CPT | Performed by: STUDENT IN AN ORGANIZED HEALTH CARE EDUCATION/TRAINING PROGRAM

## 2021-12-28 PROCEDURE — 85025 COMPLETE CBC W/AUTO DIFF WBC: CPT | Performed by: OTOLARYNGOLOGY

## 2021-12-28 PROCEDURE — 25000003 PHARM REV CODE 250: Performed by: STUDENT IN AN ORGANIZED HEALTH CARE EDUCATION/TRAINING PROGRAM

## 2021-12-28 PROCEDURE — 84100 ASSAY OF PHOSPHORUS: CPT | Performed by: STUDENT IN AN ORGANIZED HEALTH CARE EDUCATION/TRAINING PROGRAM

## 2021-12-28 PROCEDURE — 25000003 PHARM REV CODE 250: Performed by: OTOLARYNGOLOGY

## 2021-12-28 PROCEDURE — 94760 N-INVAS EAR/PLS OXIMETRY 1: CPT

## 2021-12-28 PROCEDURE — 92597 ORAL SPEECH DEVICE EVAL: CPT

## 2021-12-28 PROCEDURE — P9016 RBC LEUKOCYTES REDUCED: HCPCS

## 2021-12-28 PROCEDURE — 99233 PR SUBSEQUENT HOSPITAL CARE,LEVL III: ICD-10-PCS | Mod: GC,,, | Performed by: SURGERY

## 2021-12-28 PROCEDURE — 25000242 PHARM REV CODE 250 ALT 637 W/ HCPCS: Performed by: STUDENT IN AN ORGANIZED HEALTH CARE EDUCATION/TRAINING PROGRAM

## 2021-12-28 PROCEDURE — 99900026 HC AIRWAY MAINTENANCE (STAT)

## 2021-12-28 PROCEDURE — 36415 COLL VENOUS BLD VENIPUNCTURE: CPT | Performed by: STUDENT IN AN ORGANIZED HEALTH CARE EDUCATION/TRAINING PROGRAM

## 2021-12-28 PROCEDURE — 27000221 HC OXYGEN, UP TO 24 HOURS

## 2021-12-28 PROCEDURE — 99900035 HC TECH TIME PER 15 MIN (STAT)

## 2021-12-28 PROCEDURE — 94761 N-INVAS EAR/PLS OXIMETRY MLT: CPT

## 2021-12-28 PROCEDURE — 63600175 PHARM REV CODE 636 W HCPCS: Performed by: SURGERY

## 2021-12-28 PROCEDURE — 27200966 HC CLOSED SUCTION SYSTEM

## 2021-12-28 PROCEDURE — 20600001 HC STEP DOWN PRIVATE ROOM

## 2021-12-28 PROCEDURE — 99233 SBSQ HOSP IP/OBS HIGH 50: CPT | Mod: GC,,, | Performed by: SURGERY

## 2021-12-28 RX ORDER — ACETAMINOPHEN 650 MG/20.3ML
650 LIQUID ORAL EVERY 6 HOURS PRN
Status: DISCONTINUED | OUTPATIENT
Start: 2021-12-28 | End: 2022-01-07

## 2021-12-28 RX ORDER — FAMOTIDINE 20 MG/1
20 TABLET, FILM COATED ORAL 2 TIMES DAILY
Status: DISCONTINUED | OUTPATIENT
Start: 2021-12-28 | End: 2022-01-07

## 2021-12-28 RX ORDER — OXYCODONE HCL 5 MG/5 ML
5 SOLUTION, ORAL ORAL EVERY 6 HOURS PRN
Status: DISCONTINUED | OUTPATIENT
Start: 2021-12-28 | End: 2022-01-06

## 2021-12-28 RX ORDER — DILTIAZEM HYDROCHLORIDE 30 MG/1
30 TABLET, FILM COATED ORAL EVERY 6 HOURS
Status: DISCONTINUED | OUTPATIENT
Start: 2021-12-28 | End: 2022-01-07

## 2021-12-28 RX ADMIN — FAMOTIDINE 20 MG: 20 TABLET ORAL at 09:12

## 2021-12-28 RX ADMIN — INSULIN ASPART 2 UNITS: 100 INJECTION, SOLUTION INTRAVENOUS; SUBCUTANEOUS at 05:12

## 2021-12-28 RX ADMIN — MUPIROCIN: 20 OINTMENT TOPICAL at 08:12

## 2021-12-28 RX ADMIN — DILTIAZEM HYDROCHLORIDE 30 MG: 30 TABLET, FILM COATED ORAL at 09:12

## 2021-12-28 RX ADMIN — FAMOTIDINE 20 MG: 20 TABLET ORAL at 08:12

## 2021-12-28 RX ADMIN — ACETAMINOPHEN 1000 MG: 10 INJECTION, SOLUTION INTRAVENOUS at 05:12

## 2021-12-28 RX ADMIN — DILTIAZEM HYDROCHLORIDE 30 MG: 30 TABLET, FILM COATED ORAL at 07:12

## 2021-12-28 RX ADMIN — OXYCODONE HYDROCHLORIDE 5 MG: 5 SOLUTION ORAL at 03:12

## 2021-12-28 RX ADMIN — SODIUM CHLORIDE, SODIUM LACTATE, POTASSIUM CHLORIDE, AND CALCIUM CHLORIDE: .6; .31; .03; .02 INJECTION, SOLUTION INTRAVENOUS at 06:12

## 2021-12-28 RX ADMIN — ENOXAPARIN SODIUM 40 MG: 40 INJECTION SUBCUTANEOUS at 06:12

## 2021-12-28 NOTE — ASSESSMENT & PLAN NOTE
Okay to start tube feeds via G tube  Recommend starting at 10 cc, increasing by 10 cc Q4H to goal.   Okay to vent for nausea, high residuals (> 500)      General surgery will sign off, please reach out with questions or concerns

## 2021-12-28 NOTE — PROGRESS NOTES
[facility]  General Surgery  Progress Note    Subjective:     History of Present Illness:  No notes on file    Post-Op Info:  Procedure(s) (LRB):  CREATION, TRACHEOSTOMY (N/A)  LARYNGOSCOPY, DIRECT, WITH BRONCHOSCOPY (N/A)  INSERTION, PEG TUBE (N/A)   1 Day Post-Op     Interval History: NAEON.  S/p PEG tube placement yesterday and tracheostomy. G tube to gravity overnight, minimal output. Up to chair this am, feeling well.     Medications:  Continuous Infusions:   dilTIAZem      lactated ringers 75 mL/hr at 12/28/21 0643     Scheduled Meds:   enoxaparin  40 mg Subcutaneous Daily    famotidine (PF)  20 mg Intravenous Q12H    mupirocin   Nasal BID     PRN Meds:sodium chloride, sodium chloride 0.9%, calcium gluconate IVPB, calcium gluconate IVPB, calcium gluconate IVPB, dextrose 50%, glucagon (human recombinant), HYDROmorphone, insulin aspart U-100, magnesium sulfate IVPB, magnesium sulfate IVPB, potassium bicarbonate, potassium bicarbonate, potassium bicarbonate, potassium chloride in water **AND** potassium chloride in water **AND** potassium chloride in water, sodium chloride 0.9%, sodium chloride 0.9%, tranexamic acid     Review of patient's allergies indicates:   Allergen Reactions    Pollen extracts     Lovastatin Rash     Not confirmed but pt skeptical     Objective:     Vital Signs (Most Recent):  Temp: 97.7 °F (36.5 °C) (12/28/21 0400)  Pulse: 71 (12/28/21 0645)  Resp: 19 (12/28/21 0645)  BP: (!) 161/81 (12/28/21 0645)  SpO2: 100 % (12/28/21 0645) Vital Signs (24h Range):  Temp:  [97.6 °F (36.4 °C)-98.6 °F (37 °C)] 97.7 °F (36.5 °C)  Pulse:  [63-99] 71  Resp:  [14-35] 19  SpO2:  [96 %-100 %] 100 %  BP: (103-171)/(55-81) 161/81     Weight: 66 kg (145 lb 8.1 oz)  Body mass index is 23.48 kg/m².    Intake/Output - Last 3 Shifts       12/26 0700 12/27 0659 12/27 0700 12/28 0659 12/28 0700 12/29 0659    I.V. (mL/kg) 1462.3 (22.2) 1356 (20.5)     Blood 471 700     IV Piggyback   293.1     Total Intake(mL/kg) 1933.3 (29.3) 2349.1 (35.6)     Urine (mL/kg/hr) 1150 (0.7) 1500 (0.9)     Other 550      Stool       Total Output 1700 1500     Net +233.3 +849.1                  Physical Exam  Constitutional:       Appearance: Normal appearance.   HENT:      Head: Normocephalic.   Eyes:      Extraocular Movements: Extraocular movements intact.   Neck:      Comments: Tracheostomy, on trach collar   Cardiovascular:      Rate and Rhythm: Normal rate and regular rhythm.      Pulses: Normal pulses.   Pulmonary:      Effort: Pulmonary effort is normal. No respiratory distress.   Abdominal:      General: There is no distension.      Palpations: Abdomen is soft.      Comments: G tube to gravity, minimal gastric contents, insertion site clean and dry     Musculoskeletal:         General: Normal range of motion.   Skin:     General: Skin is warm and dry.   Neurological:      General: No focal deficit present.      Mental Status: He is alert and oriented to person, place, and time.   Psychiatric:         Mood and Affect: Mood normal.         Behavior: Behavior normal.         Significant Labs:  I have reviewed all pertinent lab results within the past 24 hours.  CBC:   Recent Labs   Lab 12/28/21 0428   WBC 9.46   RBC 3.38*   HGB 9.6*   HCT 28.3*      MCV 84   MCH 28.4   MCHC 33.9     BMP:   Recent Labs   Lab 12/28/21 0428   *   *   K 4.6      CO2 21*   BUN 25*   CREATININE 0.8   CALCIUM 8.1*   MG 2.0     CMP:   Recent Labs   Lab 12/28/21 0428   *   CALCIUM 8.1*   ALBUMIN 2.3*   PROT 4.8*   *   K 4.6   CO2 21*      BUN 25*   CREATININE 0.8   ALKPHOS 70   ALT 11   AST 18   BILITOT 1.8*       Significant Diagnostics:  I have reviewed all pertinent imaging results/findings within the past 24 hours.    Assessment/Plan:     * Larynx neoplasm malignant  Okay to start tube feeds via G tube  Recommend starting at 10 cc, increasing by 10 cc Q4H to goal.   Okay to vent for  nausea, high residuals (> 500)      General surgery will sign off, please reach out with questions or concerns         Abril Chen MD  General Surgery  [facility]

## 2021-12-28 NOTE — SUBJECTIVE & OBJECTIVE
Interval History: NAEON.  S/p PEG tube placement yesterday and tracheostomy. G tube to gravity overnight, minimal output. Up to chair this am, feeling well.     Medications:  Continuous Infusions:   dilTIAZem      lactated ringers 75 mL/hr at 12/28/21 0643     Scheduled Meds:   enoxaparin  40 mg Subcutaneous Daily    famotidine (PF)  20 mg Intravenous Q12H    mupirocin   Nasal BID     PRN Meds:sodium chloride, sodium chloride 0.9%, calcium gluconate IVPB, calcium gluconate IVPB, calcium gluconate IVPB, dextrose 50%, glucagon (human recombinant), HYDROmorphone, insulin aspart U-100, magnesium sulfate IVPB, magnesium sulfate IVPB, potassium bicarbonate, potassium bicarbonate, potassium bicarbonate, potassium chloride in water **AND** potassium chloride in water **AND** potassium chloride in water, sodium chloride 0.9%, sodium chloride 0.9%, tranexamic acid     Review of patient's allergies indicates:   Allergen Reactions    Pollen extracts     Lovastatin Rash     Not confirmed but pt skeptical     Objective:     Vital Signs (Most Recent):  Temp: 97.7 °F (36.5 °C) (12/28/21 0400)  Pulse: 71 (12/28/21 0645)  Resp: 19 (12/28/21 0645)  BP: (!) 161/81 (12/28/21 0645)  SpO2: 100 % (12/28/21 0645) Vital Signs (24h Range):  Temp:  [97.6 °F (36.4 °C)-98.6 °F (37 °C)] 97.7 °F (36.5 °C)  Pulse:  [63-99] 71  Resp:  [14-35] 19  SpO2:  [96 %-100 %] 100 %  BP: (103-171)/(55-81) 161/81     Weight: 66 kg (145 lb 8.1 oz)  Body mass index is 23.48 kg/m².    Intake/Output - Last 3 Shifts       12/26 0700 12/27 0659 12/27 0700 12/28 0659 12/28 0700 12/29 0659    I.V. (mL/kg) 1462.3 (22.2) 1356 (20.5)     Blood 471 700     IV Piggyback  293.1     Total Intake(mL/kg) 1933.3 (29.3) 2349.1 (35.6)     Urine (mL/kg/hr) 1150 (0.7) 1500 (0.9)     Other 550      Stool       Total Output 1700 1500     Net +233.3 +849.1                  Physical Exam  Constitutional:       Appearance: Normal appearance.   HENT:      Head: Normocephalic.    Eyes:      Extraocular Movements: Extraocular movements intact.   Neck:      Comments: Tracheostomy, on trach collar   Cardiovascular:      Rate and Rhythm: Normal rate and regular rhythm.      Pulses: Normal pulses.   Pulmonary:      Effort: Pulmonary effort is normal. No respiratory distress.   Abdominal:      General: There is no distension.      Palpations: Abdomen is soft.      Comments: G tube to gravity, minimal gastric contents, insertion site clean and dry     Musculoskeletal:         General: Normal range of motion.   Skin:     General: Skin is warm and dry.   Neurological:      General: No focal deficit present.      Mental Status: He is alert and oriented to person, place, and time.   Psychiatric:         Mood and Affect: Mood normal.         Behavior: Behavior normal.         Significant Labs:  I have reviewed all pertinent lab results within the past 24 hours.  CBC:   Recent Labs   Lab 12/28/21 0428   WBC 9.46   RBC 3.38*   HGB 9.6*   HCT 28.3*      MCV 84   MCH 28.4   MCHC 33.9     BMP:   Recent Labs   Lab 12/28/21 0428   *   *   K 4.6      CO2 21*   BUN 25*   CREATININE 0.8   CALCIUM 8.1*   MG 2.0     CMP:   Recent Labs   Lab 12/28/21 0428   *   CALCIUM 8.1*   ALBUMIN 2.3*   PROT 4.8*   *   K 4.6   CO2 21*      BUN 25*   CREATININE 0.8   ALKPHOS 70   ALT 11   AST 18   BILITOT 1.8*       Significant Diagnostics:  I have reviewed all pertinent imaging results/findings within the past 24 hours.

## 2021-12-28 NOTE — PT/OT/SLP EVAL
Speech Language Pathology Evaluation  One Way Speaking Valve Evaluation    Patient Name:  Cyrus Batres Jr.   MRN:  [id number]  Admitting Diagnosis: Larynx neoplasm malignant    IMPORTANT  CAUTION: CUFF MUST ALWAYS BE DEFLATED WHEN VALVE IN USE    Recommendations:                 General Recommendations:  One Way Speaking Valve  Diet recommendations:   , NPO  Aspiration Precautions: Continue alternate means of nutrition   General Precautions: Standard,    Communication strategies:  One way speaking valve    History:     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       Past Surgical History:   Procedure Laterality Date    DIRECT LARYNGOBRONCHOSCOPY N/A 12/27/2021    Procedure: LARYNGOSCOPY, DIRECT, WITH BRONCHOSCOPY;  Surgeon: Flex Espinosa MD;  Location: [facility] OR [address] [place] [address];  Service: ENT;  Laterality: N/A;    LARYNGOSCOPY N/A 8/4/2020    Procedure: Suspension microlaryngoscopy with biopsy, possible KTP laser treatment/excision;  Surgeon: Stew Noel MD;  Location: [facility] OR [address] [place] [address];  Service: ENT;  Laterality: N/A;  Microscope, telescopes, tower, microinstruments, KTP laser, rep conf# [id number] IC 7/28.    LARYNGOSCOPY N/A 3/16/2021    Procedure: Suspension microlaryngoscopy with excision of lesion, possible CO2 laser;  Surgeon: Stew Noel MD;  Location: [facility] OR [address] [place] [address];  Service: ENT;  Laterality: N/A;  Microscope, telescopes, tower, microinstruments, CO2 laser, rep conf# [id number] IC 3/4.    LARYNGOSCOPY N/A 4/1/2021    Procedure: Suspension microlaryngoscopy with KTP laser excision of lesion;  Surgeon: Stew Noel MD;  Location: [facility] OR [address] [place] [address];  Service: ENT;  Laterality: N/A;  Microscope, telescopes, tower, microinstruments, 70 degree scope, vocal fold , KTP laser, rep conf# [id number] BC    LARYNGOSCOPY N/A  12/9/2021    Procedure: Suspension microlaryngoscopy with biopsy;  Surgeon: Stew Noel MD;  [facility];  Service: ENT;  Laterality: N/A;  Microscope, telescopes, tower, microinstruments    MICROLARYNGOSCOPY N/A 3/17/2020    Procedure: MICROLARYNGOSCOPY;  Surgeon: Jung Xiao MD;  [facility];  Service: ENT;  Laterality: N/A;  Laser Microlaryngoscopy  NEED TO SCHEDULE LASER from [facility] [id number]    TRACHEOSTOMY N/A 12/27/2021    Procedure: CREATION, TRACHEOSTOMY;  Surgeon: Flex Espinosa MD;  [facility];  Service: ENT;  Laterality: N/A;       Social History: Patient lives with spouse.    Subjective     Awake/alert    Objective:     Level of Alertness:  · Alert  · Cooperative    Secretion Management:  ·  Patient was able to handle secretions orally    Pain/Comfort:  · Pain Rating 1: 0/10  · Pain Rating Post-Intervention 1: 0/10    Respiratory Status: trach collar    Oral Musculature Evaluation  · Oral Labial Strength and Mobility: WFL  · Lingual Strength and Mobility: WFL      Passy Huseyin Speaking Valve  Trach size/type: shiley 6 cuff deflated  Able to phonate around trach: no  Vocal quality with digital finger occlusion: jharsh breathy, hoarse  Vocal quality with valve in place: Hoarse, adequate intensity, harsh/breathy  Back pressure upon removal: minimal-moderate  Minutes PMSV worn: 30 seconds  Education topics addressed: cleaning valve, one-way technology, anatomy of trach, precautions with cuffed trach, removal of valve prior to sleeping. SLP also discussed PMSV candidacy with laryngeal tumor. Pt scheduled for laryngectomy on 1/6.SLP will complete pre-op counseling while pt admitted.       Assessment:     Cyrus Batres Jr. is a 71 y.o. male with an SLP diagnosis of One Way Speaking Valve Training.    Goals:   Multidisciplinary Problems     SLP Goals     Not on file                Plan:     · Patient to be seen:  4 x/week   · Plan of Care reviewed with:   patient   · SLP Follow-Up:  Yes             Time Tracking:     SLP Treatment Date:   12/28/21  Speech Start Time:  1343  Speech Stop Time:  1355     Speech Total Time (min):  12 min    Billable Minutes: Evaluation Use/Fit Voiced Prosthetic 12 12/28/2021

## 2021-12-28 NOTE — SUBJECTIVE & OBJECTIVE
Interval History: Received 2U pRBC overnight (4 total this admit). Reports improvement in hemoptysis. Able to rest last night. Hgb 9.6 post-transfusion. G-tube to gravity    Medications:  Continuous Infusions:   dilTIAZem      lactated ringers 75 mL/hr at 12/28/21 0643     Scheduled Meds:   enoxaparin  40 mg Subcutaneous Daily    famotidine (PF)  20 mg Intravenous Q12H    mupirocin   Nasal BID     PRN Meds:sodium chloride, sodium chloride 0.9%, calcium gluconate IVPB, calcium gluconate IVPB, calcium gluconate IVPB, dextrose 50%, glucagon (human recombinant), HYDROmorphone, insulin aspart U-100, magnesium sulfate IVPB, magnesium sulfate IVPB, potassium bicarbonate, potassium bicarbonate, potassium bicarbonate, potassium chloride in water **AND** potassium chloride in water **AND** potassium chloride in water, sodium chloride 0.9%, sodium chloride 0.9%, tranexamic acid     Review of patient's allergies indicates:   Allergen Reactions    Pollen extracts     Lovastatin Rash     Not confirmed but pt skeptical     Objective:     Vital Signs (24h Range):  Temp:  [97.6 °F (36.4 °C)-98.6 °F (37 °C)] 97.7 °F (36.5 °C)  Pulse:  [63-99] 72  Resp:  [14-35] 19  SpO2:  [96 %-100 %] 99 %  BP: (103-171)/(55-81) 161/76       Lines/Drains/Airways     Drain                 Gastrostomy/Enterostomy 12/27/21 1152 Percutaneous endoscopic gastrostomy (PEG) LUQ <1 day          Airway                 Surgical Airway 12/27/21 1231 Shiley Cuffed <1 day          Peripheral Intravenous Line                 Peripheral IV - Single Lumen 12/26/21 0800 20 G Left;Posterior Forearm 1 day         Peripheral IV - Single Lumen 12/26/21 0937 20 G Anterior;Right Upper Arm 1 day                Physical Exam  NAD  6-0 DCS sutured in place, cuff down this morning  No peristomal blood, blood tinged tracheal secretions  Able to phonate with finger occlusion  Post-radiation fibrosis of neck    Significant Labs:  CBC:   Recent Labs   Lab 12/28/21  8328    WBC 9.46   RBC 3.38*   HGB 9.6*   HCT 28.3*      MCV 84   MCH 28.4   MCHC 33.9     CMP:   Recent Labs   Lab 12/28/21  0428   *   CALCIUM 8.1*   ALBUMIN 2.3*   PROT 4.8*   *   K 4.6   CO2 21*      BUN 25*   CREATININE 0.8   ALKPHOS 70   ALT 11   AST 18   BILITOT 1.8*       Significant Diagnostics:  I have reviewed and interpreted all pertinent imaging results/findings within the past 24 hours.

## 2021-12-28 NOTE — PROGRESS NOTES
[facility]  Otorhinolaryngology-Head & Neck Surgery  Progress Note    Subjective:     Post-Op Info:  Procedure(s) (LRB):  CREATION, TRACHEOSTOMY (N/A)  LARYNGOSCOPY, DIRECT, WITH BRONCHOSCOPY (N/A)  INSERTION, PEG TUBE (N/A)   1 Day Post-Op  Hospital Day: 4     Interval History: Received 2U pRBC overnight (4 total this admit). Reports improvement in hemoptysis. Able to rest last night. Hgb 9.6 post-transfusion. G-tube to gravity    Medications:  Continuous Infusions:   dilTIAZem      lactated ringers 75 mL/hr at 12/28/21 0643     Scheduled Meds:   enoxaparin  40 mg Subcutaneous Daily    famotidine (PF)  20 mg Intravenous Q12H    mupirocin   Nasal BID     PRN Meds:sodium chloride, sodium chloride 0.9%, calcium gluconate IVPB, calcium gluconate IVPB, calcium gluconate IVPB, dextrose 50%, glucagon (human recombinant), HYDROmorphone, insulin aspart U-100, magnesium sulfate IVPB, magnesium sulfate IVPB, potassium bicarbonate, potassium bicarbonate, potassium bicarbonate, potassium chloride in water **AND** potassium chloride in water **AND** potassium chloride in water, sodium chloride 0.9%, sodium chloride 0.9%, tranexamic acid     Review of patient's allergies indicates:   Allergen Reactions    Pollen extracts     Lovastatin Rash     Not confirmed but pt skeptical     Objective:     Vital Signs (24h Range):  Temp:  [97.6 °F (36.4 °C)-98.6 °F (37 °C)] 97.7 °F (36.5 °C)  Pulse:  [63-99] 72  Resp:  [14-35] 19  SpO2:  [96 %-100 %] 99 %  BP: (103-171)/(55-81) 161/76       Lines/Drains/Airways     Drain                 Gastrostomy/Enterostomy 12/27/21 1152 Percutaneous endoscopic gastrostomy (PEG) LUQ <1 day          Airway                 Surgical Airway 12/27/21 1231 Shiley Cuffed <1 day          Peripheral Intravenous Line                 Peripheral IV - Single Lumen 12/26/21 0800 20 G Left;Posterior Forearm 1 day         Peripheral IV - Single Lumen 12/26/21 0937 20 G Anterior;Right Upper  Arm 1 day                Physical Exam  NAD  6-0 DCS sutured in place, cuff down this morning  No peristomal blood, blood tinged tracheal secretions  Able to phonate with finger occlusion  Post-radiation fibrosis of neck    Significant Labs:  CBC:   Recent Labs   Lab 12/28/21  0428   WBC 9.46   RBC 3.38*   HGB 9.6*   HCT 28.3*      MCV 84   MCH 28.4   MCHC 33.9     CMP:   Recent Labs   Lab 12/28/21  0428   *   CALCIUM 8.1*   ALBUMIN 2.3*   PROT 4.8*   *   K 4.6   CO2 21*      BUN 25*   CREATININE 0.8   ALKPHOS 70   ALT 11   AST 18   BILITOT 1.8*       Significant Diagnostics:  I have reviewed and interpreted all pertinent imaging results/findings within the past 24 hours.    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    S/p DL/trach/PEG on 12/27. Awaiting definitive laryngectomy. Trach stoma placed superiorly through the tumor.     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     Discussed in detail he and his wife his situation and the options prior to his laryngectomy which include intubation, tracheostomy creation or conservative measures. He is unlikely to be able to tolerate conservative measures alone due to frequent stimulation of his larynx from the bleeding. After a long discussion, they agreed to proceed with placement of a tracheostomy tube prior to his definitive laryngectomy.     - Will plan for trach/g-tube this afternoon with Dr Espinosa and Gen surgery  - consent obtained this AM  - Keep NPO, mIVF  -  ICU for airway observation  - Nebulized TXA q4h prn  - Continue to hold home eliquis for now, ok with subq heparin  - Home meds restarted otherwise  - Intubatable with a a 6-0 ETT should bleeding begin to compromise his airway  - q12h H/H, Daily labs  - Continue ICU care  - Please call or page ENT with any concerns        Mitesh Tang MD  Otorhinolaryngology-Head & Neck Surgery  [facility]

## 2021-12-28 NOTE — SUBJECTIVE & OBJECTIVE
Interval History/Significant Events: s/p trach/PEG yesterday. Already on trach collar. Hgb yesterday was 5.6. Received 2U pRBC, now w hgb 9.6. AF and HDS    Follow-up For: Procedure(s) (LRB):  CREATION, TRACHEOSTOMY (N/A)  LARYNGOSCOPY, DIRECT, WITH BRONCHOSCOPY (N/A)  INSERTION, PEG TUBE (N/A)    Post-Operative Day: 1 Day Post-Op    Objective:     Vital Signs (Most Recent):  Temp: 97.7 °F (36.5 °C) (12/28/21 0400)  Pulse: 71 (12/28/21 0645)  Resp: 19 (12/28/21 0645)  BP: (!) 161/81 (12/28/21 0645)  SpO2: 100 % (12/28/21 0645) Vital Signs (24h Range):  Temp:  [97.6 °F (36.4 °C)-98.6 °F (37 °C)] 97.7 °F (36.5 °C)  Pulse:  [63-99] 71  Resp:  [14-35] 19  SpO2:  [96 %-100 %] 100 %  BP: (103-171)/(55-81) 161/81     Weight: 66 kg (145 lb 8.1 oz)  Body mass index is 23.48 kg/m².      Intake/Output Summary (Last 24 hours) at 12/28/2021 0707  Last data filed at 12/28/2021 0600  Gross per 24 hour   Intake 2250.79 ml   Output 1500 ml   Net 750.79 ml       Physical Exam  Constitutional:       Appearance: Normal appearance.   HENT:      Head: Normocephalic.   Eyes:      Extraocular Movements: Extraocular movements intact.   Neck:      Comments: Tracheostomy, on trach collar  Bright red output from trach  Cardiovascular:      Rate and Rhythm: Normal rate and regular rhythm.      Pulses: Normal pulses.   Pulmonary:      Effort: Pulmonary effort is normal. No respiratory distress.   Abdominal:      General: There is no distension.      Palpations: Abdomen is soft.      Comments: G tube to gravity, minimal gastric contents, insertion site clean and dry     Musculoskeletal:         General: Normal range of motion.   Skin:     General: Skin is warm and dry.   Neurological:      General: No focal deficit present.      Mental Status: He is alert and oriented to person, place, and time.   Psychiatric:         Mood and Affect: Mood normal.         Behavior: Behavior normal.         Vents:  Oxygen Concentration (%): 35 (12/28/21  0600)    Lines/Drains/Airways     Drain                 Gastrostomy/Enterostomy 12/27/21 1152 Percutaneous endoscopic gastrostomy (PEG) LUQ <1 day          Airway                 Surgical Airway 12/27/21 1231 Shiley Cuffed <1 day          Peripheral Intravenous Line                 Peripheral IV - Single Lumen 12/26/21 0800 20 G Left;Posterior Forearm 1 day         Peripheral IV - Single Lumen 12/26/21 0937 20 G Anterior;Right Upper Arm 1 day                Significant Labs:    CBC/Anemia Profile:  Recent Labs   Lab 12/27/21  0800 12/27/21  2045 12/28/21  0428   WBC 7.27 8.16 9.46   HGB 7.2* 5.6* 9.6*   HCT 22.4* 16.7* 28.3*    246 242   MCV 88 86 84   RDW 13.2 13.3 13.9        Chemistries:  Recent Labs   Lab 12/27/21  0244 12/28/21  0428   * 134*   K 4.3 4.6    105   CO2 21* 21*   BUN 25* 25*   CREATININE 0.8 0.8   CALCIUM 8.3* 8.1*   ALBUMIN 2.5* 2.3*   PROT 4.7* 4.8*   BILITOT 3.7* 1.8*   ALKPHOS 65 70   ALT 10 11   AST 20 18   MG 1.9 2.0   PHOS 2.2* 3.0       All pertinent labs within the past 24 hours have been reviewed.    Significant Imaging:  I have reviewed all pertinent imaging results/findings within the past 24 hours.

## 2021-12-28 NOTE — ASSESSMENT & PLAN NOTE
70yo M w h/o Afib, HTN, DM (diet controlled), and laryngeal ca. Presents w hemoptysis. ENT admits to SICU for airway watch.      Neuro/Psych:   -- Sedation: Stopped seroquel  -- Pain: tylenol and oxy 5 PRN              Cards:   -- HDS  -- Maintain MAP > 65  -- PO dilt thru G      Pulm:   -- Goal O2 sat > 90%  -- s/p trach, on trach collar      Renal:  -- BUN/Cr baseline        FEN / GI:   -- Replace lytes as needed  -- Nutrition: TF w Impact Peptide. Advance to goal  -- mIVF when NPO      ID:   -- Tm: afebrile; WBC wnl  -- Abx none      Heme/Onc:   -- H/H dropped overnight to 5.6, s/p 2 units. Now 9.6  -- q12 CBC  -- Transfuse to maintain hgb >7  -- Primary team to continue to hold home eliquis for now. Primary team to decide when to start  -- prophylactic lovenox       Endo:   -- Gluc goal 140-180  -- ISS      PPx:   Feeding: NPO  Analgesia/Sedation: oxy  Thromboembolic prevention: lovenox  HOB >30: y  Stress Ulcer ppx: famotidine  Glucose control: Critical care goal 140-180 g/dl, ISS    Lines/Drains/Airway: PIV x2      Dispo/Code Status/Palliative:   -- Ok for SD/ Full Code   -- discussed with SICU staff

## 2021-12-28 NOTE — PROGRESS NOTES
[facility] - [facility] Intensive Care  Critical Care - Surgery  Progress Note    Patient Name: Cyrus Batres Jr.  MRN: [id number]  Admission Date: 12/25/2021  Hospital Length of Stay: 3 days  Code Status: Full Code  Attending Provider: Flex Espinosa MD  Primary Care Provider: Diana Calhoun MD   Principal Problem: Larynx neoplasm malignant    Subjective:     Hospital/ICU Course:  No notes on file    Interval History/Significant Events: s/p trach/PEG yesterday. Already on trach collar. Hgb yesterday was 5.6. Received 2U pRBC, now w hgb 9.6. AF and HDS    Follow-up For: Procedure(s) (LRB):  CREATION, TRACHEOSTOMY (N/A)  LARYNGOSCOPY, DIRECT, WITH BRONCHOSCOPY (N/A)  INSERTION, PEG TUBE (N/A)    Post-Operative Day: 1 Day Post-Op    Objective:     Vital Signs (Most Recent):  Temp: 97.7 °F (36.5 °C) (12/28/21 0400)  Pulse: 71 (12/28/21 0645)  Resp: 19 (12/28/21 0645)  BP: (!) 161/81 (12/28/21 0645)  SpO2: 100 % (12/28/21 0645) Vital Signs (24h Range):  Temp:  [97.6 °F (36.4 °C)-98.6 °F (37 °C)] 97.7 °F (36.5 °C)  Pulse:  [63-99] 71  Resp:  [14-35] 19  SpO2:  [96 %-100 %] 100 %  BP: (103-171)/(55-81) 161/81     Weight: 66 kg (145 lb 8.1 oz)  Body mass index is 23.48 kg/m².      Intake/Output Summary (Last 24 hours) at 12/28/2021 0707  Last data filed at 12/28/2021 0600  Gross per 24 hour   Intake 2250.79 ml   Output 1500 ml   Net 750.79 ml       Physical Exam  Constitutional:       Appearance: Normal appearance.   HENT:      Head: Normocephalic.   Eyes:      Extraocular Movements: Extraocular movements intact.   Neck:      Comments: Tracheostomy, on trach collar  Bright red output from trach  Cardiovascular:      Rate and Rhythm: Normal rate and regular rhythm.      Pulses: Normal pulses.   Pulmonary:      Effort: Pulmonary effort is normal. No respiratory distress.   Abdominal:      General: There is no distension.      Palpations: Abdomen is soft.      Comments: G tube to gravity, minimal gastric contents, insertion site  clean and dry     Musculoskeletal:         General: Normal range of motion.   Skin:     General: Skin is warm and dry.   Neurological:      General: No focal deficit present.      Mental Status: He is alert and oriented to person, place, and time.   Psychiatric:         Mood and Affect: Mood normal.         Behavior: Behavior normal.         Vents:  Oxygen Concentration (%): 35 (12/28/21 0600)    Lines/Drains/Airways     Drain                 Gastrostomy/Enterostomy 12/27/21 1152 Percutaneous endoscopic gastrostomy (PEG) LUQ <1 day          Airway                 Surgical Airway 12/27/21 1231 Shiley Cuffed <1 day          Peripheral Intravenous Line                 Peripheral IV - Single Lumen 12/26/21 0800 20 G Left;Posterior Forearm 1 day         Peripheral IV - Single Lumen 12/26/21 0937 20 G Anterior;Right Upper Arm 1 day                Significant Labs:    CBC/Anemia Profile:  Recent Labs   Lab 12/27/21  0800 12/27/21  2045 12/28/21  0428   WBC 7.27 8.16 9.46   HGB 7.2* 5.6* 9.6*   HCT 22.4* 16.7* 28.3*    246 242   MCV 88 86 84   RDW 13.2 13.3 13.9        Chemistries:  Recent Labs   Lab 12/27/21  0244 12/28/21  0428   * 134*   K 4.3 4.6    105   CO2 21* 21*   BUN 25* 25*   CREATININE 0.8 0.8   CALCIUM 8.3* 8.1*   ALBUMIN 2.5* 2.3*   PROT 4.7* 4.8*   BILITOT 3.7* 1.8*   ALKPHOS 65 70   ALT 10 11   AST 20 18   MG 1.9 2.0   PHOS 2.2* 3.0       All pertinent labs within the past 24 hours have been reviewed.    Significant Imaging:  I have reviewed all pertinent imaging results/findings within the past 24 hours.    Assessment/Plan:     Hemoptysis  72yo M w h/o Afib, HTN, DM (diet controlled), and laryngeal ca. Presents w hemoptysis. ENT admits to SICU for airway watch.      Neuro/Psych:   -- Sedation: Stopped seroquel  -- Pain: tylenol and oxy 5 PRN              Cards:   -- HDS  -- Maintain MAP > 65  -- PO dilt thru G      Pulm:   -- Goal O2 sat > 90%  -- s/p trach, on trach collar       Renal:  -- BUN/Cr baseline        FEN / GI:   -- Replace lytes as needed  -- Nutrition: TF w Impact Peptide. Advance to goal  -- mIVF when NPO      ID:   -- Tm: afebrile; WBC wnl  -- Abx none      Heme/Onc:   -- H/H dropped overnight to 5.6, s/p 2 units. Now 9.6  -- q12 CBC  -- Transfuse to maintain hgb >7  -- Primary team to continue to hold home eliquis for now. Primary team to decide when to start  -- prophylactic lovenox       Endo:   -- Gluc goal 140-180  -- ISS      PPx:   Feeding: NPO  Analgesia/Sedation: oxy  Thromboembolic prevention: lovenox  HOB >30: y  Stress Ulcer ppx: famotidine  Glucose control: Critical care goal 140-180 g/dl, ISS    Lines/Drains/Airway: PIV x2      Dispo/Code Status/Palliative:   -- Ok for SD/ Full Code   -- discussed with SICU staff          Critical care was time spent personally by me on the following activities: development of treatment plan with patient or surrogate and bedside caregivers, discussions with consultants, evaluation of patient's response to treatment, examination of patient, ordering and performing treatments and interventions, ordering and review of laboratory studies, ordering and review of radiographic studies, pulse oximetry, re-evaluation of patient's condition.  This critical care time did not overlap with that of any other provider or involve time for any procedures.     Jaskaran Johnson MD  Critical Care - Surgery  [facility]

## 2021-12-29 LAB
ALBUMIN SERPL BCP-MCNC: 2.6 G/DL (ref 3.5–5.2)
ALP SERPL-CCNC: 73 U/L (ref 55–135)
ALT SERPL W/O P-5'-P-CCNC: 12 U/L (ref 10–44)
ANION GAP SERPL CALC-SCNC: 7 MMOL/L (ref 8–16)
AST SERPL-CCNC: 13 U/L (ref 10–40)
BILIRUB SERPL-MCNC: 1.3 MG/DL (ref 0.1–1)
BUN SERPL-MCNC: 22 MG/DL (ref 8–23)
CALCIUM SERPL-MCNC: 9 MG/DL (ref 8.7–10.5)
CHLORIDE SERPL-SCNC: 101 MMOL/L (ref 95–110)
CO2 SERPL-SCNC: 25 MMOL/L (ref 23–29)
CREAT SERPL-MCNC: 0.8 MG/DL (ref 0.5–1.4)
ERYTHROCYTE [DISTWIDTH] IN BLOOD BY AUTOMATED COUNT: 13.5 % (ref 11.5–14.5)
ERYTHROCYTE [DISTWIDTH] IN BLOOD BY AUTOMATED COUNT: 13.8 % (ref 11.5–14.5)
EST. GFR  (AFRICAN AMERICAN): >60 ML/MIN/1.73 M^2
EST. GFR  (NON AFRICAN AMERICAN): >60 ML/MIN/1.73 M^2
GLUCOSE SERPL-MCNC: 173 MG/DL (ref 70–110)
HCT VFR BLD AUTO: 34 % (ref 40–54)
HCT VFR BLD AUTO: 34.2 % (ref 40–54)
HGB BLD-MCNC: 11.4 G/DL (ref 14–18)
HGB BLD-MCNC: 11.7 G/DL (ref 14–18)
MAGNESIUM SERPL-MCNC: 1.7 MG/DL (ref 1.6–2.6)
MCH RBC QN AUTO: 28.4 PG (ref 27–31)
MCH RBC QN AUTO: 28.9 PG (ref 27–31)
MCHC RBC AUTO-ENTMCNC: 33.5 G/DL (ref 32–36)
MCHC RBC AUTO-ENTMCNC: 34.2 G/DL (ref 32–36)
MCV RBC AUTO: 84 FL (ref 82–98)
MCV RBC AUTO: 85 FL (ref 82–98)
PHOSPHATE SERPL-MCNC: 2.9 MG/DL (ref 2.7–4.5)
PLATELET # BLD AUTO: 247 K/UL (ref 150–450)
PLATELET # BLD AUTO: 275 K/UL (ref 150–450)
PMV BLD AUTO: 10.4 FL (ref 9.2–12.9)
PMV BLD AUTO: 9.9 FL (ref 9.2–12.9)
POCT GLUCOSE: 174 MG/DL (ref 70–110)
POCT GLUCOSE: 185 MG/DL (ref 70–110)
POCT GLUCOSE: 224 MG/DL (ref 70–110)
POCT GLUCOSE: 227 MG/DL (ref 70–110)
POCT GLUCOSE: 246 MG/DL (ref 70–110)
POTASSIUM SERPL-SCNC: 4.4 MMOL/L (ref 3.5–5.1)
PROT SERPL-MCNC: 4.9 G/DL (ref 6–8.4)
RBC # BLD AUTO: 4.02 M/UL (ref 4.6–6.2)
RBC # BLD AUTO: 4.05 M/UL (ref 4.6–6.2)
SODIUM SERPL-SCNC: 133 MMOL/L (ref 136–145)
WBC # BLD AUTO: 8.31 K/UL (ref 3.9–12.7)
WBC # BLD AUTO: 9.21 K/UL (ref 3.9–12.7)

## 2021-12-29 PROCEDURE — 25000003 PHARM REV CODE 250: Performed by: STUDENT IN AN ORGANIZED HEALTH CARE EDUCATION/TRAINING PROGRAM

## 2021-12-29 PROCEDURE — 36415 COLL VENOUS BLD VENIPUNCTURE: CPT | Performed by: STUDENT IN AN ORGANIZED HEALTH CARE EDUCATION/TRAINING PROGRAM

## 2021-12-29 PROCEDURE — 97535 SELF CARE MNGMENT TRAINING: CPT

## 2021-12-29 PROCEDURE — 25000242 PHARM REV CODE 250 ALT 637 W/ HCPCS: Performed by: STUDENT IN AN ORGANIZED HEALTH CARE EDUCATION/TRAINING PROGRAM

## 2021-12-29 PROCEDURE — 20600001 HC STEP DOWN PRIVATE ROOM

## 2021-12-29 PROCEDURE — 99900035 HC TECH TIME PER 15 MIN (STAT)

## 2021-12-29 PROCEDURE — 94761 N-INVAS EAR/PLS OXIMETRY MLT: CPT

## 2021-12-29 PROCEDURE — 85027 COMPLETE CBC AUTOMATED: CPT | Performed by: STUDENT IN AN ORGANIZED HEALTH CARE EDUCATION/TRAINING PROGRAM

## 2021-12-29 PROCEDURE — 83735 ASSAY OF MAGNESIUM: CPT | Performed by: STUDENT IN AN ORGANIZED HEALTH CARE EDUCATION/TRAINING PROGRAM

## 2021-12-29 PROCEDURE — 84100 ASSAY OF PHOSPHORUS: CPT | Performed by: STUDENT IN AN ORGANIZED HEALTH CARE EDUCATION/TRAINING PROGRAM

## 2021-12-29 PROCEDURE — 80053 COMPREHEN METABOLIC PANEL: CPT | Performed by: STUDENT IN AN ORGANIZED HEALTH CARE EDUCATION/TRAINING PROGRAM

## 2021-12-29 PROCEDURE — 27000221 HC OXYGEN, UP TO 24 HOURS

## 2021-12-29 PROCEDURE — 63600175 PHARM REV CODE 636 W HCPCS

## 2021-12-29 RX ADMIN — FAMOTIDINE 20 MG: 20 TABLET ORAL at 08:12

## 2021-12-29 RX ADMIN — DILTIAZEM HYDROCHLORIDE 30 MG: 30 TABLET, FILM COATED ORAL at 12:12

## 2021-12-29 RX ADMIN — ACETAMINOPHEN 650 MG: 160 SOLUTION ORAL at 08:12

## 2021-12-29 RX ADMIN — MUPIROCIN: 20 OINTMENT TOPICAL at 08:12

## 2021-12-29 RX ADMIN — DILTIAZEM HYDROCHLORIDE 30 MG: 30 TABLET, FILM COATED ORAL at 05:12

## 2021-12-29 RX ADMIN — INSULIN ASPART 2 UNITS: 100 INJECTION, SOLUTION INTRAVENOUS; SUBCUTANEOUS at 02:12

## 2021-12-29 RX ADMIN — OXYCODONE HYDROCHLORIDE 5 MG: 5 SOLUTION ORAL at 05:12

## 2021-12-29 RX ADMIN — INSULIN ASPART 2 UNITS: 100 INJECTION, SOLUTION INTRAVENOUS; SUBCUTANEOUS at 05:12

## 2021-12-29 RX ADMIN — DILTIAZEM HYDROCHLORIDE 30 MG: 30 TABLET, FILM COATED ORAL at 11:12

## 2021-12-29 RX ADMIN — ENOXAPARIN SODIUM 40 MG: 40 INJECTION SUBCUTANEOUS at 05:12

## 2021-12-29 NOTE — PT/OT/SLP EVAL
Pre-Operative Total Laryngectomy   Patient Name:  Cyrus Batres Jr.   MRN:  [id number]  Admitting Diagnosis: Larynx neoplasm malignant    Recommendations:                 General Recommendations:   pre-op laryngectomy counseling  Diet recommendations:  Patient NPO   General Precautions: Standard,      Communication:      Communication strategies: Eyeglasses, Go to room if call light pushed and Whiteboard/Paper-pencil provided   Patient communicating all wants and needs? yes     History:     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       Past Surgical History:   Procedure Laterality Date    DIRECT LARYNGOBRONCHOSCOPY N/A 12/27/2021    Procedure: LARYNGOSCOPY, DIRECT, WITH BRONCHOSCOPY;  Surgeon: Flex Espinosa MD;  Location: [facility] OR [address] [place] [address];  Service: ENT;  Laterality: N/A;    LARYNGOSCOPY N/A 8/4/2020    Procedure: Suspension microlaryngoscopy with biopsy, possible KTP laser treatment/excision;  Surgeon: Stew Noel MD;  Location: [facility] OR [address] [place] [address];  Service: ENT;  Laterality: N/A;  Microscope, telescopes, tower, microinstruments, KTP laser, rep conf# [id number] IC 7/28.    LARYNGOSCOPY N/A 3/16/2021    Procedure: Suspension microlaryngoscopy with excision of lesion, possible CO2 laser;  Surgeon: Stew Noel MD;  Location: [facility] OR [address] [place] [address];  Service: ENT;  Laterality: N/A;  Microscope, telescopes, tower, microinstruments, CO2 laser, rep conf# [id number] IC 3/4.    LARYNGOSCOPY N/A 4/1/2021    Procedure: Suspension microlaryngoscopy with KTP laser excision of lesion;  Surgeon: Stew Noel MD;  Location: [facility] OR [address] [place] [address];  Service: ENT;  Laterality: N/A;  Microscope, telescopes, tower, microinstruments, 70 degree scope, vocal fold , KTP laser, rep conf# [id number] BC    LARYNGOSCOPY N/A 12/9/2021    Procedure: Suspension  microlaryngoscopy with biopsy;  Surgeon: Stew Noel MD;  Location: [facility] OR [address] [place] [address];  Service: ENT;  Laterality: N/A;  Microscope, telescopes, tower, microinstruments    MICROLARYNGOSCOPY N/A 3/17/2020    Procedure: MICROLARYNGOSCOPY;  Surgeon: Jung Xiao MD;  Location: [facility] OR;  Service: ENT;  Laterality: N/A;  Laser Microlaryngoscopy  NEED TO SCHEDULE LASER from [facility] [id number]    TRACHEOSTOMY N/A 12/27/2021    Procedure: CREATION, TRACHEOSTOMY;  Surgeon: Flex Espinosa MD;  Location: [facility] OR [address] [place] [address];  Service: ENT;  Laterality: N/A;        SOCIAL HISTORY and SUPPORT:   Mr. Batres lives with his spouse and dog in [place]. 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        BEHAVIOR:   He was a very pleasant M who was seen at bedside this morning. His affect and social interaction were appropriate for situation and setting.  He was fully cooperative for the assessment.       HEARING:   Subjectively, adequate hearing acuity    ORAL PERIPHERAL:WFL    Pt with trach/PEG placement 12/27-unable to tolerate PMSV at this time       COUNSELING:   Mr. Batres was asked to tell his understanding of what the proposed surgery would entail. He was able to tell that his larynx would be removed.       Physical and/or lifestyle changes that are typical results of total laryngectomy were discussed with him   These included    1. Changes in anatomy and the permanent removal of the larynx and separation of the respiratory and digestive tracts.    2. Changes in the mechanisms of taste, smell, sniffing, sneezing, nose blowing, blowing, and activities heretofore requiring effortful closure of the vocal cords (e.g., effortful physical work, defecation, coughing).    3. Precautions required for bathing/showering and shampooing, shaving, and activities in or near water.    4. Changes in airway filtration and effects of dry/cold air, humidity, and use of HMEs (with LaryTube or base plate).    5.  "Alaryngeal communication methods, including use of artificial larynx immediately post-op with an oral adapter, optional neck placement of artificial larynx once surgically cleared, traditional esophageal speech, and tracheoesophageal puncture with prosthesis (TEP) and esophageal voicing powered by lung air.     6. Rescue breathing for neck-breathers (brochure for caregivers and link to youtube.com video to be shared by family with local first responders were provided).    7. General hygiene needs for stoma (and TEP when applicable).    8. Changes in other activities including (e.g., sleep, intercourse).    9. Possible post-op need for other therapies such as PT and/or OT.        Mr. aBtres was provided with a packet of materials (incorporated into his binder with materials from other disciplines that will follow him through "boot camp")    These included    1. Contact information for the International Association of Laryngectomees as well as a recent copy of the IAL Newsletter.    2. Rescue Breathing for Laryngecomtees brochure and resource for video on TVTY.com re: the same, as well as cards/decals for wallet and car identifying him as a neck-breather.    3. Medic-Alert contact information.    4. Educational literature: Why Didn't They Tell Me?: Questions and Answers for the Laryngectomee, Life as a Laryngectomee, and First Steps.    5. Do's and Don'ts for Family and Friends handout    6. Illustrations depicting pre-surgical, post-TL, and post-TEP anatomy.    7. Other resources, including websites, brochures, catalogues, and relay service information.        The general course of therapeutic intervention was discussed including that he would see the acute-care SLPs while in hospital, likely receive home health upon discharge, and would be to able to come back to this clinic as an outpatient as needed once home health services were completed.        Mr. Batres acknowledged understanding of the " "above topics and asked appropriate questions.            RECOMMENDATIONS:   It is felt that he would benefit from   1.  Further review of the materials provided and follow-up with clinician as needed for clarification or additional information.   2.  Visitation with a laryngectomee "ambassador."    3.  Continuation of plans for surgery as proposed by Dr James   4.  Use of HME cassettes as soon as possible post-op to facilitate optimal pulmonary rehab.   5.  Inpatient post-op ST for AL training and stoma care and other education as soon as Dr. James feels patient is ready.   6.  Consider likely need for Home Health ST upon discharge for continued AL training, stoma care training, and any other related education/training.  · 7.  Return to outpatient therapy as directed by Dr. James  I will be available to the patient/family by phone or as an adjunct to his surgical post-op visits while in home health care.     Goals:   Multidisciplinary Problems       SLP Goals          Problem: SLP Goal    Goal Priority Disciplines Outcome   SLP Goal     SLP Ongoing, Progressing   Description: Speech Language Pathology Goals  Goals expected to be met by 1/4    1. Pt will tolerate PMSV for all waking hours with >92% spO2 to increase phonation, respiration and swallowing aspects  2. Pt/family will don/doff PMSV x1 during session with min cues  3. Pt will vocalize with  PMSV in place to increase verbal expression.                          Plan:     · Patient to be seen:  3 x/week   · Plan of Care reviewed with:  patient   · SLP Follow-Up:  Yes       Discharge recommendations:    home      Time Tracking:     SLP Treatment Date:   12/29/21  Speech Start Time:  0846  Speech Stop Time:  0925     Speech Total Time (min):  39 min    Billable Minutes: Self Care/Home Management Training 39    12/29/2021    "

## 2021-12-29 NOTE — PROGRESS NOTES
Nael Craey - [facility]  Otorhinolaryngology-Head & Neck Surgery  Progress Note    Subjective:     Post-Op Info:  Procedure(s) (LRB):  CREATION, TRACHEOSTOMY (N/A)  LARYNGOSCOPY, DIRECT, WITH BRONCHOSCOPY (N/A)  INSERTION, PEG TUBE (N/A)   2 Days Post-Op  Hospital Day: 5     Interval History: No acute events. No further bleeding. Breathing well.     Medications:  Continuous Infusions:  Scheduled Meds:   diltiaZEM  30 mg Per G Tube Q6H    enoxaparin  40 mg Subcutaneous Daily    famotidine  20 mg Per G Tube BID    mupirocin   Nasal BID     PRN Meds:sodium chloride, sodium chloride 0.9%, acetaminophen, calcium gluconate IVPB, calcium gluconate IVPB, calcium gluconate IVPB, dextrose 50%, glucagon (human recombinant), insulin aspart U-100, magnesium sulfate IVPB, magnesium sulfate IVPB, oxyCODONE, potassium bicarbonate, potassium bicarbonate, potassium bicarbonate, sodium chloride 0.9%, sodium chloride 0.9%     Review of patient's allergies indicates:   Allergen Reactions    Pollen extracts     Lovastatin Rash     Not confirmed but pt skeptical     Objective:     Vital Signs (24h Range):  Temp:  [97.6 °F (36.4 °C)-98.1 °F (36.7 °C)] 98.1 °F (36.7 °C)  Pulse:  [] 81  Resp:  [15-26] 18  SpO2:  [93 %-100 %] 96 %  BP: (129-162)/(68-88) 158/78       Lines/Drains/Airways     Drain                 Gastrostomy/Enterostomy 12/27/21 1152 Percutaneous endoscopic gastrostomy (PEG) LUQ 1 day          Airway                 Surgical Airway 12/27/21 1231 Shiley Cuffed 1 day          Peripheral Intravenous Line                 Peripheral IV - Single Lumen 12/26/21 0800 20 G Left;Posterior Forearm 2 days         Peripheral IV - Single Lumen 12/26/21 0937 20 G Anterior;Right Upper Arm 2 days                Physical Exam  NAD  6-0 DCS sutured in place, cuff down this morning  No peristomal blood, secretions white, no blood.   Able to phonate with finger occlusion  Post-radiation fibrosis of neck    Significant Labs:  BMP:   Recent  Labs   Lab 12/29/21  0417   *      CO2 25   BUN 22   CREATININE 0.8   CALCIUM 9.0   MG 1.7     CBC:   Recent Labs   Lab 12/28/21 2014   WBC 10.30   RBC 3.95*   HGB 11.1*   HCT 33.8*      MCV 86   MCH 28.1   MCHC 32.8       Significant Diagnostics:  I have reviewed and interpreted all pertinent imaging results/findings within the past 24 hours.    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    S/p DL/trach/PEG on 12/27. Awaiting definitive laryngectomy. Trach stoma placed superiorly through the tumor.     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     Discussed in detail he and his wife his situation and the options prior to his laryngectomy which include intubation, tracheostomy creation or conservative measures. He is unlikely to be able to tolerate conservative measures alone due to frequent stimulation of his larynx from the bleeding. After a long discussion, they agreed to proceed with placement of a tracheostomy tube prior to his definitive laryngectomy.     - Will plan for trach/g-tube this afternoon with Dr Espinosa and Gen surgery  - consent obtained this AM  - Keep NPO, mIVF  - ICU for airway observation  - Nebulized TXA q4h prn  - Continue to hold home eliquis for now, ok with subq heparin  - Home meds restarted otherwise  - Intubatable with a a 6-0 ETT should bleeding begin to compromise his airway  - q12h H/H, Daily labs  - Continue ICU care  - Please call or page ENT with any concerns        Yo Herrera MD  Otorhinolaryngology-Head & Neck Surgery  [facility]

## 2021-12-29 NOTE — PLAN OF CARE
Problem: SLP Goal  Goal: SLP Goal  Description: Speech Language Pathology Goals  Goals expected to be met by 1/4    1. Pt will tolerate PMSV for all waking hours with >92% spO2 to increase phonation, respiration and swallowing aspects  2. Pt/family will don/doff PMSV x1 during session with min cues  3. Pt will vocalize with  PMSV in place to increase verbal expression.     Outcome: Ongoing, Progressing   Pre-op laryngectomy counseling completed.   12/29/2021

## 2021-12-29 NOTE — RESPIRATORY THERAPY
RAPID RESPONSE RESPIRATORY THERAPY PROACTIVE NOTE           Time of visit: 822     Code Status: Full Code   : 1950  Bed: 1043/1043 A:   MRN: [id number]  Time spent at the bedside: < 15 min    SITUATION    Evaluated patient for: LDA Check     BACKGROUND    Patient has a past medical history of Allergy, Atrial fibrillation, Chronic anticoagulation, Diabetes mellitus, type 2, Hypertension, and Larynx neoplasm malignant.  Clinically Significant Surgical Hx: tracheostomy    24 Hours Vitals Range:  Temp:  [97.6 °F (36.4 °C)-98.5 °F (36.9 °C)]   Pulse:  []   Resp:  [15-25]   BP: (129-162)/(69-87)   SpO2:  [93 %-100 %]     Labs:    Recent Labs     21  0244 21  0428 21  0417   * 134* 133*   K 4.3 4.6 4.4    105 101   CO2 21* 21* 25   CREATININE 0.8 0.8 0.8   * 183* 173*   PHOS 2.2* 3.0 2.9   MG 1.9 2.0 1.7        No results for input(s): PH, PCO2, PO2, HCO3, POCSATURATED, BE in the last 72 hours.    ASSESSMENT/INTERVENTIONS    Upon arrival in room pt resting comfortably with no resp needs at this time.    Last VS   Temp: 98.5 °F (36.9 °C) (828)  Pulse: 69 ( 08)  Resp: 16 (828)  BP: 148/76 (828)  SpO2: 99 % (828)    Level of Consciousness: Level of Consciousness (AVPU): alert  Respiratory Effort: Respiratory Effort: Unlabored Expansion/Accessory Muscle Usage: Expansion/Accessory Muscles/Retractions: no use of accessory muscles,expansion symmetric  All Lung Field Breath Sounds: All Lung Fields Breath Sounds: Anterior:,Posterior:,coarse,equal bilaterally  O2 Device/Concentration: Flow (L/min): 8, Oxygen Concentration (%): 35, O2 Device (Oxygen Therapy): tracheostomy collar  Surgical airway: Yes, Type: Shiley Size: 6   Ambu at bedside: Ambu bag with the patient?: Yes, Adult Ambu     Active Orders   Respiratory Care    Inhalation Treatment Q4H PRN     Frequency: Q4H PRN     Number of Occurrences: Until Specified    Oxygen Continuous      Frequency: Continuous     Number of Occurrences: Until Specified     Order Questions:      Device type: Low flow      Device: Trach Collar      Titrate O2 per Oxygen Titration Protocol: Yes      To maintain SpO2 goal of: >= 90%      Notify MD of: Inability to achieve desired SpO2; Sudden change in patient status and requires 20% increase in FiO2; Patient requires >60% FiO2    Pulse Oximetry Continuous     Frequency: Continuous     Number of Occurrences: Until Specified    Routine tracheostomy care     Frequency: BID     Number of Occurrences: Until Specified       RECOMMENDATIONS    We recommend: RRT Recs: Continue POC per primary team.    ESCALATION        FOLLOW-UP    Please call back the Rapid Response RT, Antonio Roberts RRT at x [phone] for any questions or concerns.

## 2021-12-29 NOTE — ASSESSMENT & PLAN NOTE
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    S/p DL/trach/PEG on 12/27. Awaiting definitive laryngectomy. Trach stoma placed superiorly through the tumor.     - Keep NPO, mIVF   - G-tube to gravity until instruction from GS  - Discontinue neb TXA  - Home meds restarted otherwise  - q12h H/H, Daily labs   - s/p 4U pRBC this admit  - Please call or page ENT with any concerns

## 2021-12-29 NOTE — PLAN OF CARE
POC reviewed with pt, verbalized understanding. VSS on trach collar,8L, 35%. AAOX4. Remains free of falls and injury. Up to chair for most of shift.    -Tube feeds @10ml/hr  -Pt refused assessment of buttocks    NPO, denies nausea. Pain controlled. Telemetry, NSR. ACCU ACHS. . Pt denies chest pain and SOB. No acute events or distress noted. Bed in lowest position, call light in reach, frequent rounds made for safety.    Night nurse will resume care.        Problem: Adult Inpatient Plan of Care  Goal: Plan of Care Review  Outcome: Met  Goal: Patient-Specific Goal (Individualized)  Outcome: Met  Goal: Absence of Hospital-Acquired Illness or Injury  Outcome: Met  Goal: Optimal Comfort and Wellbeing  Outcome: Met     Problem: Diabetes Comorbidity  Goal: Blood Glucose Level Within Targeted Range  Outcome: Met

## 2021-12-29 NOTE — PLAN OF CARE
Recommendations    1. Suggest TF of Isosource 1.5 advancing as tolerated to goal rate of 45 mL/hr to provide 1620 kcal, 73 gm protein, and 825 mL free fluid.    - Bolus: 5 cans/day to provide 1875 kcal, 85 gm protein, and 955 mL free fluid.     2. As medically able, ADAT to diabetic with texture per SLP.     3. RD following.     Goals: meet % EEN/EPN by RD follow-up  Nutrition Goal Status: new

## 2021-12-29 NOTE — PLAN OF CARE
POC reviewed w/ pt, verbalized understanding. Pt AAOx4.    - trach in place with 8L 35% trach collar  - VSS on tele, NSR and   - patient denies pain overnight  - PEG tube in place with TF currently at 30mL/hr, no complaints of nausea. TF next to be increased at 8am to 40mL/hr to reach goal of 45mL/hr.  - voids per urinal, adequeate UOP overnight  - pt complaints of constipation and denies passing flatus; LBM reported on 12/25.    Pt slept between care. Call light answered in person. Frequent rounds made for pt safety. Call light in reach and bed in lowest position. Will continue to monitor POC.

## 2021-12-29 NOTE — PROGRESS NOTES
"Nael Carey - [facility]  Adult Nutrition  Progress Note    SUMMARY       Recommendations    1. Suggest TF of Isosource 1.5 advancing as tolerated to goal rate of 45 mL/hr to provide 1620 kcal, 73 gm protein, and 825 mL free fluid.    - Bolus: 5 cans/day to provide 1875 kcal, 85 gm protein, and 955 mL free fluid.     2. As medically able, ADAT to diabetic with texture per SLP.     3. RD following.     Goals: meet % EEN/EPN by RD follow-up  Nutrition Goal Status: new  Communication of RD Recs:  (POC)    Assessment and Plan    Nutrition Problem  Inadequate Energy Intake    Related to (etiology):   Inadequate Enteral Nutrition Infusion     Signs and Symptoms (as evidenced by):   TF meeting <50% EEN/EPN    Intervention (treatment strategy):  Collaboration of nutrition care with other providers  Enteral Nutrition     Nutrition Diagnosis Status:   New    Reason for Assessment    Reason For Assessment: new tube feeding  Diagnosis:  (Larynx neoplasm malignant)  Relevant Medical History: AFib, DM, HTN  Interdisciplinary Rounds: did not attend  General Information Comments: POD2 trach/PEG, awaiting definitive laryngectomy. Pt resting in bed, no family present. Pt reports tolerating TF well with some nausea, no cramping. Pt reports good po intake with no significant wt loss PTA.  lbs. Pt does not meet malnutrition criteria.  Nutrition Discharge Planning: adequate nutrition via TF    Nutrition Risk Screen    Nutrition Risk Screen: tube feeding or parenteral nutrition    Nutrition/Diet History    Factors Affecting Nutritional Intake: altered gastrointestinal function,NPO    Anthropometrics    Temp: 98.5 °F (36.9 °C)  Height Method: Stated  Height: 5' 6" (167.6 cm)  Height (inches): 66 in  Weight Method: Bed Scale  Weight: 66 kg (145 lb 8.1 oz)  Weight (lb): 145.51 lb  Ideal Body Weight (IBW), Male: 142 lb  % Ideal Body Weight, Male (lb): 103.4 %  BMI (Calculated): 23.5  BMI Grade: 18.5-24.9 - normal   "   Lab/Procedures/Meds    Pertinent Labs Reviewed: reviewed  Pertinent Labs Comments: Na 133, glucose 173, T bili 1.3   Pertinent Medications Reviewed: reviewed  Pertinent Medications Comments: famotidine     Estimated/Assessed Needs    Weight Used For Calorie Calculations: 66 kg (145 lb 8.1 oz)  Energy Calorie Requirements (kcal): 1702 kcal/day  Energy Need Method: [place]-St Jeor (x 1.25)  Protein Requirements: 79 gm/day (1.2 gm/kg)  Weight Used For Protein Calculations: 66 kg (145 lb 8.1 oz)  Fluid Requirements (mL): 1 mL/kcal or per MD     RDA Method (mL): 1702  CHO Requirement: 212 gm/day     Nutrition Prescription Ordered    Current Diet Order: NPO  Current Nutrition Support Formula Ordered: Impact Peptide 1.5  Current Nutrition Support Rate Ordered: 10 (ml)  Current Nutrition Support Frequency Ordered: mL/hr    Evaluation of Received Nutrient/Fluid Intake    I/O: +1.109L since admit   Energy Calories Required: not meeting needs  Protein Required: not meeting needs  Fluid Required:  (per MD)  Comments: LBM 12/25  Tolerance: tolerating  % Intake of Estimated Energy Needs: 0 - 25 %  % Meal Intake: NPO    Nutrition Risk    Level of Risk/Frequency of Follow-up:  (f/u 1 x wk)     Monitor and Evaluation    Food and Nutrient Intake: energy intake,enteral nutrition intake  Food and Nutrient Adminstration: enteral and parenteral nutrition administration  Anthropometric Measurements: weight,weight change,body mass index  Biochemical Data, Medical Tests and Procedures: electrolyte and renal panel,gastrointestinal profile,glucose/endocrine profile,inflammatory profile,lipid profile  Nutrition-Focused Physical Findings: overall appearance     Nutrition Follow-Up    RD Follow-up?: Yes

## 2021-12-29 NOTE — OP NOTE
Date of Operation: 12/27/2021    Surgeon: RANULFO COLLADO     Assistants: none    Anesthesia: General endotracheal anesthesia    ASA Class: 2    Preoperative Diagnosis:   1) Recurrent larynx cancer  2) Hemoptysis  3) Acute blood loss anemia    Postoperative diagnosis:  1) Recurrent larynx cancer  2) Hemoptysis  3) Acute blood loss anemia    Procedures performed:  1) Tracheostomy  2) Direct laryngoscopy with telescope (diagnostic)    Closing Set: No    Indications for operation:  Cyrus Batres Jr. is a 71 y.o. male with recurrent larynx cancer. He has subglottic disease that has been oozing, resulting in admission and transfusion of PRBCs. Tracheostomy was proposed as a means to decrease the drainage of blood into his lungs and perhaps decrease his coughing as he awaits definitive salvage care.     The risks, benefits, indications, and alternatives to this procedure were thoroughly discussed with the patient preoperatively, and informed consent was obtained. Please see my detailed preoperative notes for a thorough account of this discussion.    Findings: Tumor in subglottis with clot. Tracheostomy made between the thyroid cartilage and the cricoid ring, with resection of the central 1cm of the cricoid ring. There was visible tumor at the superior aspect of this site. I was unable to access the subglottis on laryngoscopy and was unable to cauterize the tumor or apply any topical therapy. However it is superior to the trach site, so it should no longer trouble his breathing.     EBL: 5mL    IVF:  May be found in the operative record    Detailed Account of Technique Employed:    The patient was brought into the operating room and placed supine on the operating table. The patient was intubated transorally by the anesthesiology service, and an adequate level of general endotracheal anesthesia was obtained. The patient's neck was prepped and draped in the standard, sterile fashion. A timeout was performed according to the  universal protocol.     The anatomic landmarks were noted. Because of the looming laryngectomy, a high tracheostomy is needed to decrease the risk of unnecessary tracheal resection beyond normal tumor margins. The cricoid ring was palpated, and there was no overt tumor in the area. I elected to make a transverse incision over this. Blunt dissection allowed identification of the straps, along with the thyroid and cricoid cartilages. The trachea was entered in the cricothyroid membrane, and the central cricoid ring was sharply excised. The fused 1-2 rings were then secured to the dermis as a modified Nando flap. The 6-0 Ohanaley trach was inserted, connected to the circuit, and confirmed to deliver adequate ventilation. It was secured with silk sutures and a velcro tie.     I then placed a maxillary tooth guard and inserted the Diallo laryngoscope to view the oral cavity, oropharynx, hypopharynx, and endolarynx. The 0 degree telescope was inserted to optimize visualization, and it was passed into the subglottis and proximal trachea, with the above findings noted. The trach was noted to be in good position. The scope and tooth guard were removed, and the procedure terminated.    The patient was allowed to awaken from anesthesia, extubated, and taken to the PACU in stable condition.     All sponge, needle, and instrument counts were correct at the end of the procedure x 2.     I was present for and performed all portions of the operation as noted above.

## 2021-12-30 LAB
ALBUMIN SERPL BCP-MCNC: 2.9 G/DL (ref 3.5–5.2)
ALP SERPL-CCNC: 457 U/L (ref 55–135)
ALT SERPL W/O P-5'-P-CCNC: 287 U/L (ref 10–44)
ANION GAP SERPL CALC-SCNC: 10 MMOL/L (ref 8–16)
AST SERPL-CCNC: 224 U/L (ref 10–40)
BILIRUB SERPL-MCNC: 5.3 MG/DL (ref 0.1–1)
BUN SERPL-MCNC: 24 MG/DL (ref 8–23)
CALCIUM SERPL-MCNC: 9.4 MG/DL (ref 8.7–10.5)
CHLORIDE SERPL-SCNC: 97 MMOL/L (ref 95–110)
CO2 SERPL-SCNC: 27 MMOL/L (ref 23–29)
CREAT SERPL-MCNC: 0.9 MG/DL (ref 0.5–1.4)
ERYTHROCYTE [DISTWIDTH] IN BLOOD BY AUTOMATED COUNT: 13.5 % (ref 11.5–14.5)
ERYTHROCYTE [DISTWIDTH] IN BLOOD BY AUTOMATED COUNT: 13.5 % (ref 11.5–14.5)
EST. GFR  (AFRICAN AMERICAN): >60 ML/MIN/1.73 M^2
EST. GFR  (NON AFRICAN AMERICAN): >60 ML/MIN/1.73 M^2
GLUCOSE SERPL-MCNC: 196 MG/DL (ref 70–110)
HCT VFR BLD AUTO: 34 % (ref 40–54)
HCT VFR BLD AUTO: 35 % (ref 40–54)
HGB BLD-MCNC: 11.3 G/DL (ref 14–18)
HGB BLD-MCNC: 11.7 G/DL (ref 14–18)
MAGNESIUM SERPL-MCNC: 2 MG/DL (ref 1.6–2.6)
MCH RBC QN AUTO: 28.3 PG (ref 27–31)
MCH RBC QN AUTO: 28.3 PG (ref 27–31)
MCHC RBC AUTO-ENTMCNC: 33.2 G/DL (ref 32–36)
MCHC RBC AUTO-ENTMCNC: 33.4 G/DL (ref 32–36)
MCV RBC AUTO: 85 FL (ref 82–98)
MCV RBC AUTO: 85 FL (ref 82–98)
PHOSPHATE SERPL-MCNC: 2.7 MG/DL (ref 2.7–4.5)
PLATELET # BLD AUTO: 245 K/UL (ref 150–450)
PLATELET # BLD AUTO: 250 K/UL (ref 150–450)
PMV BLD AUTO: 9.6 FL (ref 9.2–12.9)
PMV BLD AUTO: 9.8 FL (ref 9.2–12.9)
POCT GLUCOSE: 196 MG/DL (ref 70–110)
POCT GLUCOSE: 226 MG/DL (ref 70–110)
POCT GLUCOSE: 239 MG/DL (ref 70–110)
POTASSIUM SERPL-SCNC: 4.5 MMOL/L (ref 3.5–5.1)
PROT SERPL-MCNC: 6 G/DL (ref 6–8.4)
RBC # BLD AUTO: 4 M/UL (ref 4.6–6.2)
RBC # BLD AUTO: 4.14 M/UL (ref 4.6–6.2)
SODIUM SERPL-SCNC: 134 MMOL/L (ref 136–145)
WBC # BLD AUTO: 10.05 K/UL (ref 3.9–12.7)
WBC # BLD AUTO: 8.6 K/UL (ref 3.9–12.7)

## 2021-12-30 PROCEDURE — 84100 ASSAY OF PHOSPHORUS: CPT | Performed by: STUDENT IN AN ORGANIZED HEALTH CARE EDUCATION/TRAINING PROGRAM

## 2021-12-30 PROCEDURE — 20600001 HC STEP DOWN PRIVATE ROOM

## 2021-12-30 PROCEDURE — 25000003 PHARM REV CODE 250: Performed by: STUDENT IN AN ORGANIZED HEALTH CARE EDUCATION/TRAINING PROGRAM

## 2021-12-30 PROCEDURE — 27200966 HC CLOSED SUCTION SYSTEM

## 2021-12-30 PROCEDURE — 63600175 PHARM REV CODE 636 W HCPCS

## 2021-12-30 PROCEDURE — 27000221 HC OXYGEN, UP TO 24 HOURS

## 2021-12-30 PROCEDURE — 97161 PT EVAL LOW COMPLEX 20 MIN: CPT

## 2021-12-30 PROCEDURE — 97116 GAIT TRAINING THERAPY: CPT

## 2021-12-30 PROCEDURE — 80053 COMPREHEN METABOLIC PANEL: CPT | Performed by: STUDENT IN AN ORGANIZED HEALTH CARE EDUCATION/TRAINING PROGRAM

## 2021-12-30 PROCEDURE — 83735 ASSAY OF MAGNESIUM: CPT | Performed by: STUDENT IN AN ORGANIZED HEALTH CARE EDUCATION/TRAINING PROGRAM

## 2021-12-30 PROCEDURE — 99900026 HC AIRWAY MAINTENANCE (STAT)

## 2021-12-30 PROCEDURE — 36415 COLL VENOUS BLD VENIPUNCTURE: CPT | Performed by: STUDENT IN AN ORGANIZED HEALTH CARE EDUCATION/TRAINING PROGRAM

## 2021-12-30 PROCEDURE — 85027 COMPLETE CBC AUTOMATED: CPT | Mod: 91 | Performed by: STUDENT IN AN ORGANIZED HEALTH CARE EDUCATION/TRAINING PROGRAM

## 2021-12-30 PROCEDURE — 97165 OT EVAL LOW COMPLEX 30 MIN: CPT

## 2021-12-30 PROCEDURE — 99900035 HC TECH TIME PER 15 MIN (STAT)

## 2021-12-30 PROCEDURE — 94761 N-INVAS EAR/PLS OXIMETRY MLT: CPT

## 2021-12-30 RX ADMIN — ENOXAPARIN SODIUM 40 MG: 40 INJECTION SUBCUTANEOUS at 04:12

## 2021-12-30 RX ADMIN — DILTIAZEM HYDROCHLORIDE 30 MG: 30 TABLET, FILM COATED ORAL at 12:12

## 2021-12-30 RX ADMIN — DILTIAZEM HYDROCHLORIDE 30 MG: 30 TABLET, FILM COATED ORAL at 05:12

## 2021-12-30 RX ADMIN — INSULIN ASPART 2 UNITS: 100 INJECTION, SOLUTION INTRAVENOUS; SUBCUTANEOUS at 04:12

## 2021-12-30 RX ADMIN — FAMOTIDINE 20 MG: 20 TABLET ORAL at 09:12

## 2021-12-30 RX ADMIN — DILTIAZEM HYDROCHLORIDE 30 MG: 30 TABLET, FILM COATED ORAL at 11:12

## 2021-12-30 NOTE — PLAN OF CARE
POC reviewed w/ pt, verbalized understanding. Pt AAOx4.     - trach in place with 8L 35% trach collar; suctioned as needed  - VSS on tele, NSR and   - patient denies pain overnight  - PEG tube in place with TF at goal of 45mL/hr, no complaints of nausea  - voids per urinal, adequeate UOP overnight  - pt complaints of constipation but states passing flatus; LBM reported on 12/25.     Pt slept between care. Call light answered in person. Frequent rounds made for pt safety. Call light in reach and bed in lowest position. Will continue to monitor POC.

## 2021-12-30 NOTE — PROGRESS NOTES
Nael Carey - [facility]  Otorhinolaryngology-Head & Neck Surgery  Progress Note    Subjective:     Post-Op Info:  Procedure(s) (LRB):  CREATION, TRACHEOSTOMY (N/A)  LARYNGOSCOPY, DIRECT, WITH BRONCHOSCOPY (N/A)  INSERTION, PEG TUBE (N/A)   3 Days Post-Op  Hospital Day: 6     Interval History: NAEON.     Medications:  Continuous Infusions:  Scheduled Meds:   diltiaZEM  30 mg Per G Tube Q6H    enoxaparin  40 mg Subcutaneous Daily    famotidine  20 mg Per G Tube BID     PRN Meds:sodium chloride, sodium chloride 0.9%, acetaminophen, calcium gluconate IVPB, calcium gluconate IVPB, calcium gluconate IVPB, dextrose 50%, glucagon (human recombinant), insulin aspart U-100, magnesium sulfate IVPB, magnesium sulfate IVPB, oxyCODONE, potassium bicarbonate, potassium bicarbonate, potassium bicarbonate, sodium chloride 0.9%, sodium chloride 0.9%     Review of patient's allergies indicates:   Allergen Reactions    Pollen extracts     Lovastatin Rash     Not confirmed but pt skeptical     Objective:     Vital Signs (24h Range):  Temp:  [97.4 °F (36.3 °C)-100.5 °F (38.1 °C)] 99.9 °F (37.7 °C)  Pulse:  [] 103  Resp:  [16-18] 18  SpO2:  [91 %-99 %] 95 %  BP: (130-167)/(74-86) 138/74       Lines/Drains/Airways     Drain                 Gastrostomy/Enterostomy 12/27/21 1152 Percutaneous endoscopic gastrostomy (PEG) LUQ 2 days          Airway                 Surgical Airway 12/27/21 1231 Shiley Cuffed 2 days          Peripheral Intravenous Line                 Peripheral IV - Single Lumen 12/26/21 0800 20 G Left;Posterior Forearm 3 days         Peripheral IV - Single Lumen 12/26/21 0937 20 G Anterior;Right Upper Arm 3 days                Physical Exam  NAD  6-0 DCS sutured in place, cuff down this morning  No peristomal blood, secretions white, no blood.   Able to phonate with finger occlusion  Post-radiation fibrosis of neck    Significant Labs:  BMP:   Recent Labs   Lab 12/29/21  0417   *      CO2 25   BUN 22    CREATININE 0.8   CALCIUM 9.0   MG 1.7     CBC:   Recent Labs   Lab 12/29/21 1956   WBC 8.31   RBC 4.02*   HGB 11.4*   HCT 34.0*      MCV 85   MCH 28.4   MCHC 33.5       Significant Diagnostics:  I have reviewed and interpreted all pertinent imaging results/findings within the past 24 hours.    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    S/p DL/trach/PEG on 12/27. Awaiting definitive laryngectomy. Trach stoma placed superiorly through the tumor.     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     Discussed in detail he and his wife his situation and the options prior to his laryngectomy which include intubation, tracheostomy creation or conservative measures. He is unlikely to be able to tolerate conservative measures alone due to frequent stimulation of his larynx from the bleeding. After a long discussion, they agreed to proceed with placement of a tracheostomy tube prior to his definitive laryngectomy.     - Will plan for trach/g-tube this afternoon with Dr Espinosa and Gen surgery  - consent obtained this AM  - Keep NPO, mIVF  - ICU for airway observation  - Nebulized TXA q4h prn  - Continue to hold home eliquis for now, ok with subq heparin  - Home meds restarted otherwise  - Intubatable with a a 6-0 ETT should bleeding begin to compromise his airway  - q12h H/H, Daily labs  - Continue ICU care  - Please call or page ENT with any concerns        Yo Herrera MD  Otorhinolaryngology-Head & Neck Surgery  [facility]

## 2021-12-30 NOTE — RESPIRATORY THERAPY
RAPID RESPONSE RESPIRATORY THERAPY FOLLOW-UP NOTE       Followed up with patient for proactive rounding.   No acute issues at this time. Plan of care reviewed with primary RTbritton.  Placed size 4 shiley back up trach tube.  Suctioned pt per pat request.  Please call Rapid Response RT, Antonio Roberts, JERRY at [phone] with any questions or concerns.

## 2021-12-30 NOTE — SUBJECTIVE & OBJECTIVE
Interval History: NAEON.     Medications:  Continuous Infusions:  Scheduled Meds:   diltiaZEM  30 mg Per G Tube Q6H    enoxaparin  40 mg Subcutaneous Daily    famotidine  20 mg Per G Tube BID     PRN Meds:sodium chloride, sodium chloride 0.9%, acetaminophen, calcium gluconate IVPB, calcium gluconate IVPB, calcium gluconate IVPB, dextrose 50%, glucagon (human recombinant), insulin aspart U-100, magnesium sulfate IVPB, magnesium sulfate IVPB, oxyCODONE, potassium bicarbonate, potassium bicarbonate, potassium bicarbonate, sodium chloride 0.9%, sodium chloride 0.9%     Review of patient's allergies indicates:   Allergen Reactions    Pollen extracts     Lovastatin Rash     Not confirmed but pt skeptical     Objective:     Vital Signs (24h Range):  Temp:  [97.4 °F (36.3 °C)-100.5 °F (38.1 °C)] 99.9 °F (37.7 °C)  Pulse:  [] 103  Resp:  [16-18] 18  SpO2:  [91 %-99 %] 95 %  BP: (130-167)/(74-86) 138/74       Lines/Drains/Airways     Drain                 Gastrostomy/Enterostomy 12/27/21 1152 Percutaneous endoscopic gastrostomy (PEG) LUQ 2 days          Airway                 Surgical Airway 12/27/21 1231 Shiley Cuffed 2 days          Peripheral Intravenous Line                 Peripheral IV - Single Lumen 12/26/21 0800 20 G Left;Posterior Forearm 3 days         Peripheral IV - Single Lumen 12/26/21 0937 20 G Anterior;Right Upper Arm 3 days                Physical Exam  NAD  6-0 DCS sutured in place, cuff down this morning  No peristomal blood, secretions white, no blood.   Able to phonate with finger occlusion  Post-radiation fibrosis of neck    Significant Labs:  BMP:   Recent Labs   Lab 12/29/21  0417   *      CO2 25   BUN 22   CREATININE 0.8   CALCIUM 9.0   MG 1.7     CBC:   Recent Labs   Lab 12/29/21  1956   WBC 8.31   RBC 4.02*   HGB 11.4*   HCT 34.0*      MCV 85   MCH 28.4   MCHC 33.5       Significant Diagnostics:  I have reviewed and interpreted all pertinent imaging results/findings  within the past 24 hours.

## 2021-12-30 NOTE — PT/OT/SLP EVAL
Occupational Therapy   Evaluation and Discharge Note    Name: Cyrus Batres Jr.  MRN: [id number]  Admitting Diagnosis:  Larynx neoplasm malignant   Recent Surgery: Procedure(s) (LRB):  CREATION, TRACHEOSTOMY (N/A)  LARYNGOSCOPY, DIRECT, WITH BRONCHOSCOPY (N/A)  INSERTION, PEG TUBE (N/A) 3 Days Post-Op    Recommendations:     Discharge Recommendations: home  Discharge Equipment Recommendations:  none  Barriers to discharge:  None    Assessment:     Cyrus Batres Jr. is a 71 y.o. male with a medical diagnosis of Larynx neoplasm malignant. At this time, patient is functioning at their prior level of function and does not require further acute OT services.     Plan:     During this hospitalization, patient does not require further acute OT services.  Please re-consult if situation changes.    · Plan of Care Reviewed with: patient    Subjective     Chief Complaint: none stated    Occupational Profile:  Living Environment: Patient lives with his wife in [facility] with 1 CEM. Patient has a tub/shower combo with shower chair.  Previous level of function: Independent  Roles and Routines: Drives  Equipment Used at home:  none (owns cane and shower chair)  Assistance upon Discharge: Wife can assist    Pain/Comfort:  · Pain Rating 1: 0/10    Patients cultural, spiritual, Episcopalian conflicts given the current situation: no    Objective:     Communicated with: RN prior to session.  Patient found HOB elevated with Tracheostomy,PEG Tube,oxygen,telemetry upon OT entry to room.    Additional staff present: PT present for co-eval as appropriate for patient 2* medical complexities, decreased activity tolerance for 2 sessions, and level of skilled assist needed for task completion.    General Precautions: Standard, fall,respiratory   Orthopedic Precautions:N/A   Braces: N/A    Occupational Performance:    Bed Mobility:    · Patient completed Supine to Sit with modified independence; HOB elevated  · Patient completed Sit to Supine with modified  independence; HOB elevated    Functional Mobility/Transfers:  · Patient completed Sit <> Stand Transfer with supervision with no AD  · Functional Mobility: Patient ambulated community distance in hallway with supervision with no AD    Activities of Daily Living:  · Not assessed; patient reported not difficulty    Cognitive/Visual Perceptual:  Cognitive/Psychosocial Skills:    -       Oriented to: Person, Place, Time and Situation   -       Follows Commands/attention: Follows multistep commands  -       Memory: No Deficits noted  -       Safety awareness/insight to disability: intact   -       Mood/Affect/Coping skills/emotional control: Appropriate to situation, Cooperative and Pleasant    Physical Exam:  Upper Extremity Range of Motion:     -       Right Upper Extremity: WFL  -       Left Upper Extremity: WFL  Upper Extremity Strength:    -       Right Upper Extremity: WFL  -       Left Upper Extremity: WFL    AMPAC 6 Click ADL:  AMPAC Total Score: 21    Treatment & Education:   Therapist provided facilitation and instruction of proper body mechanics and fall prevention strategies during tasks listed above.   Instructed patient to sit in bedside chair daily to increase OOB/activity tolerance.   Instructed patient to use call light to have nursing staff assist with needs/transfers.   Discussed OT POC and answered all questions within OT scope of practice.   Whiteboard updated   Education:    Patient left HOB elevated with all lines intact and call button in reach    GOALS:   Multidisciplinary Problems     Occupational Therapy Goals     Not on file          Multidisciplinary Problems (Resolved)        Problem: Occupational Therapy Goal    Goal Priority Disciplines Outcome Interventions   Occupational Therapy Goal   (Resolved)     OT, PT/OT Met    Description: Skilled OT services not necessary at this time secondary to patient performing ADLs at [facility]. Patient in agreement. Please re-consult OT if change in  patient's functional status is noted.                    History:     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       Past Surgical History:   Procedure Laterality Date    DIRECT LARYNGOBRONCHOSCOPY N/A 12/27/2021    Procedure: LARYNGOSCOPY, DIRECT, WITH BRONCHOSCOPY;  Surgeon: Flex Espinosa MD;  Location: [address];  Service: ENT;  Laterality: N/A;    LARYNGOSCOPY N/A 8/4/2020    Procedure: Suspension microlaryngoscopy with biopsy, possible KTP laser treatment/excision;  Surgeon: Stew Noel MD;  Location: [address];  Service: ENT;  Laterality: N/A;  Microscope, telescopes, tower, microinstruments, KTP laser, rep conf# [id number] IC 7/28.    LARYNGOSCOPY N/A 3/16/2021    Procedure: Suspension microlaryngoscopy with excision of lesion, possible CO2 laser;  Surgeon: Stew Noel MD;  Location: [address];  Service: ENT;  Laterality: N/A;  Microscope, telescopes, tower, microinstruments, CO2 laser, rep conf# [id number] IC 3/4.    LARYNGOSCOPY N/A 4/1/2021    Procedure: Suspension microlaryngoscopy with KTP laser excision of lesion;  Surgeon: Stew Noel MD;  Location: [address];  Service: ENT;  Laterality: N/A;  Microscope, telescopes, tower, microinstruments, 70 degree scope, vocal fold , KTP laser, rep conf# [id number] BC    LARYNGOSCOPY N/A 12/9/2021    Procedure: Suspension microlaryngoscopy with biopsy;  Surgeon: Stew Noel MD;  Location: [address];  Service: ENT;  Laterality: N/A;  Microscope, telescopes, tower, microinstruments    MICROLARYNGOSCOPY N/A 3/17/2020    Procedure: MICROLARYNGOSCOPY;  Surgeon: Jung Xiao MD;  [facility]: [facility];  Service: ENT;  Laterality: N/A;  Laser Microlaryngoscopy  NEED TO SCHEDULE LASER from [facility] [id number]    TRACHEOSTOMY  N/A 12/27/2021    Procedure: CREATION, TRACHEOSTOMY;  Surgeon: Flex Espinosa MD;  Location: [facility] OR [address] [place] [address];  Service: ENT;  Laterality: N/A;       Time Tracking:     OT Date of Treatment: 12/30/21  OT Start Time: 1026  OT Stop Time: 1042  OT Total Time (min): 16 min    Billable Minutes:Evaluation 16    12/30/2021

## 2021-12-30 NOTE — ASSESSMENT & PLAN NOTE
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    S/p DL/trach/PEG on 12/27. Awaiting definitive laryngectomy. Trach stoma placed superiorly through the tumor.     - Keep NPO, mIVF   - G-tube to gravity until instruction from GS  - Discontinue neb TXA  - Home meds restarted otherwise  - q12h H/H, Daily labs   - s/p 4U pRBC this admit  - Please call or page ENT with any concerns

## 2021-12-30 NOTE — PT/OT/SLP EVAL
Physical Therapy Co-Evaluation and Discharge Note    Patient Name:  Cyrus Batres Jr.   MRN:  [id number]     OT for cotx due to pt's multiple medical comorbidities and functional deficits req'ing two skilled therapists to appropriately maximize functional potential by progressing pt's musculoskeletal strength and endurance, facilitating neuromuscular postural balance and control ,and accommodating for patient's impaired cardiopulmonary tolerance to activities.     Recommendations:     Discharge Recommendations:  home   Discharge Equipment Recommendations: none   Barriers to discharge: None    Assessment:     Cyrus Batres Jr. is a 71 y.o. male admitted with a medical diagnosis of Larynx neoplasm malignant. .  At this time, patient is functioning at their prior level of function and does not require further acute PT services.     Recent Surgery: Procedure(s) (LRB):  CREATION, TRACHEOSTOMY (N/A)  LARYNGOSCOPY, DIRECT, WITH BRONCHOSCOPY (N/A)  INSERTION, PEG TUBE (N/A) 3 Days Post-Op    Plan:     During this hospitalization, patient does not require further acute PT services.  Please re-consult if situation changes.      Subjective     Chief Complaint: none  Patient/Family Comments/goals: pt. Agreeable to PT  Pain/Comfort:  · Pain Rating 1: 0/10  · Pain Rating Post-Intervention 1: 0/10    Patients cultural, spiritual, Sikh conflicts given the current situation: no    Living Environment:  Pt. Lives with spouse in [facility] with 1 CEM  Prior to admission, patients level of function was indep.  Equipment used at home: cane, straight,shower chair.  Upon discharge, patient will have assistance from spouse.    Objective:     Communicated with nursing prior to session.  Patient found supine with Tracheostomy,PEG Tube,oxygen,telemetry,pulse ox (continuous) upon PT entry to room.    General Precautions: Standard, fall,respiratory   Orthopedic Precautions:N/A   Braces: N/A   Respiratory Status: trach collar    Exams:  · RLE ROM:  WFL  · RLE Strength: WFL  · LLE ROM: WFL  · LLE Strength: WFL    Functional Mobility:  · Bed Mobility:     · Rolling Right: supervision  · Scooting: supervision  · Supine to Sit: supervision  · Sit to Supine: supervision  · Transfers:     · Sit to Stand:  supervision with no AD  · Gait: >400' with Supervision without AD or LOB  · Balance: good    AM-PAC 6 CLICK MOBILITY  Total Score:22       Therapeutic Activities and Exercises:   Discussed therapy needs, goals, and POC.    AM-PAC 6 CLICK MOBILITY  Total Score:22     Patient left supine with all lines intact and call button in reach.    GOALS:   Multidisciplinary Problems     Physical Therapy Goals     Not on file          Multidisciplinary Problems (Resolved)        Problem: Physical Therapy Goal    Goal Priority Disciplines Outcome Goal Variances Interventions   Physical Therapy Goal   (Resolved)     PT, PT/OT Met                     History:     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       Past Surgical History:   Procedure Laterality Date    DIRECT LARYNGOBRONCHOSCOPY N/A 12/27/2021    Procedure: LARYNGOSCOPY, DIRECT, WITH BRONCHOSCOPY;  Surgeon: Flex Espinosa MD;  Location: [address];  Service: ENT;  Laterality: N/A;    LARYNGOSCOPY N/A 8/4/2020    Procedure: Suspension microlaryngoscopy with biopsy, possible KTP laser treatment/excision;  Surgeon: Stew Noel MD;  Location: [address];  Service: ENT;  Laterality: N/A;  Microscope, telescopes, tower, microinstruments, KTP laser, rep conf# [id number]  7/28.    LARYNGOSCOPY N/A 3/16/2021    Procedure: Suspension microlaryngoscopy with excision of lesion, possible CO2 laser;  Surgeon: Stew Noel MD;  Location: [address];  Service: ENT;  Laterality: N/A;  Microscope, telescopes, tower, microinstruments, CO2  laser, rep conf# [id number] IC 3/4.    LARYNGOSCOPY N/A 4/1/2021    Procedure: Suspension microlaryngoscopy with KTP laser excision of lesion;  Surgeon: Stew Noel MD;  Location: [facility] OR [address] [place] [address];  Service: ENT;  Laterality: N/A;  Microscope, telescopes, tower, microinstruments, 70 degree scope, vocal fold , KTP laser, rep conf# [id number] BC    LARYNGOSCOPY N/A 12/9/2021    Procedure: Suspension microlaryngoscopy with biopsy;  Surgeon: Stew Noel MD;  Location: [facility] OR [address] [place] [address];  Service: ENT;  Laterality: N/A;  Microscope, telescopes, tower, microinstruments    MICROLARYNGOSCOPY N/A 3/17/2020    Procedure: MICROLARYNGOSCOPY;  Surgeon: Jung Xiao MD;  Location: [facility] OR;  Service: ENT;  Laterality: N/A;  Laser Microlaryngoscopy  NEED TO SCHEDULE LASER from [facility] [employer] [id number]    TRACHEOSTOMY N/A 12/27/2021    Procedure: CREATION, TRACHEOSTOMY;  Surgeon: Flex Espinosa MD;  Location: [facility] OR [address] [place] [address];  Service: ENT;  Laterality: N/A;       Time Tracking:     PT Received On: 12/30/21  PT Start Time: 1026     PT Stop Time: 1046  PT Total Time (min): 20 min     Billable Minutes: Evaluation 12 and Gait Training 8      12/30/2021

## 2021-12-30 NOTE — PLAN OF CARE
POC reviewed with pt, verbalized understanding. VSS on trach collar,8L, 35%. AAOX4. Remains free of falls and injury.    -Tube feeds @45ml/hr, tolerating well     NPO, denies nausea. Pain controlled. Telemetry, NSR. ACCU ACHS and covered with correctional dose. . Pt denies chest pain and SOB. No acute events or distress noted. Suctioned once. Bed in lowest position, call light in reach, frequent rounds made for safety.

## 2021-12-30 NOTE — RESPIRATORY THERAPY
RAPID RESPONSE RESPIRATORY THERAPY PROACTIVE NOTE           Time of visit: 813     Code Status: Full Code   : 1950  Bed: 1043/1043 A:   MRN: [id number]  Time spent at the bedside: < 15 min    SITUATION    Evaluated patient for: LDA Check     BACKGROUND    Patient has a past medical history of Allergy, Atrial fibrillation, Chronic anticoagulation, Diabetes mellitus, type 2, Hypertension, and Larynx neoplasm malignant.  Clinically Significant Surgical Hx: tracheostomy    24 Hours Vitals Range:  Temp:  [97.4 °F (36.3 °C)-100.5 °F (38.1 °C)]   Pulse:  []   Resp:  [16-18]   BP: (138-167)/(69-86)   SpO2:  [91 %-98 %]     Labs:    Recent Labs     21  0428 21  0417 21  0613   * 133* 134*   K 4.6 4.4 4.5    101 97   CO2 21* 25 27   CREATININE 0.8 0.8 0.9   * 173* 196*   PHOS 3.0 2.9 2.7   MG 2.0 1.7 2.0        No results for input(s): PH, PCO2, PO2, HCO3, POCSATURATED, BE in the last 72 hours.    ASSESSMENT/INTERVENTIONS    Upon arrival in room pt resting comfortably in bed with no Resp needs at this time.    Last VS   Temp: 98.6 °F (37 °C) (719)  Pulse: 101 ( 07)  Resp: 16 (719)  BP: 139/69 (719)  SpO2: 96 % ( 0900)    Level of Consciousness: Level of Consciousness (AVPU): alert  Respiratory Effort: Respiratory Effort: Normal,Unlabored Expansion/Accessory Muscle Usage: Expansion/Accessory Muscles/Retractions: no use of accessory muscles,no retractions,expansion symmetric  All Lung Field Breath Sounds: All Lung Fields Breath Sounds: Anterior:,Lateral:,diminished,coarse  O2 Device/Concentration: Flow (L/min): 8, Oxygen Concentration (%): 35, O2 Device (Oxygen Therapy): room air  Surgical airway: Yes, Type: Shiley Size: 6   Ambu at bedside: Ambu bag with the patient?: Yes, Adult Ambu     Active Orders   Respiratory Care    Inhalation Treatment Q4H PRN     Frequency: Q4H PRN     Number of Occurrences: Until Specified    Oxygen Continuous      Frequency: Continuous     Number of Occurrences: Until Specified     Order Questions:      Device type: Low flow      Device: Trach Collar      Titrate O2 per Oxygen Titration Protocol: Yes      To maintain SpO2 goal of: >= 90%      Notify MD of: Inability to achieve desired SpO2; Sudden change in patient status and requires 20% increase in FiO2; Patient requires >60% FiO2    Pulse Oximetry Continuous     Frequency: Continuous     Number of Occurrences: Until Specified    Routine tracheostomy care     Frequency: BID     Number of Occurrences: Until Specified       RECOMMENDATIONS    We recommend: RRT Recs: Continue POC per primary team.    ESCALATION        FOLLOW-UP    Please call back the Rapid Response RT, Antonio Roberts RRT at x [phone] for any questions or concerns.

## 2021-12-31 PROBLEM — E11.65 TYPE 2 DIABETES MELLITUS WITH HYPERGLYCEMIA, WITHOUT LONG-TERM CURRENT USE OF INSULIN: Status: ACTIVE | Noted: 2019-01-30

## 2021-12-31 LAB
ALBUMIN SERPL BCP-MCNC: 2.2 G/DL (ref 3.5–5.2)
ALP SERPL-CCNC: 374 U/L (ref 55–135)
ALT SERPL W/O P-5'-P-CCNC: 196 U/L (ref 10–44)
ANION GAP SERPL CALC-SCNC: 9 MMOL/L (ref 8–16)
AST SERPL-CCNC: 105 U/L (ref 10–40)
BILIRUB DIRECT SERPL-MCNC: 1.5 MG/DL (ref 0.1–0.3)
BILIRUB SERPL-MCNC: 2.5 MG/DL (ref 0.1–1)
BUN SERPL-MCNC: 25 MG/DL (ref 8–23)
CALCIUM SERPL-MCNC: 9.3 MG/DL (ref 8.7–10.5)
CHLORIDE SERPL-SCNC: 100 MMOL/L (ref 95–110)
CO2 SERPL-SCNC: 23 MMOL/L (ref 23–29)
CREAT SERPL-MCNC: 0.8 MG/DL (ref 0.5–1.4)
ERYTHROCYTE [DISTWIDTH] IN BLOOD BY AUTOMATED COUNT: 13.3 % (ref 11.5–14.5)
ERYTHROCYTE [DISTWIDTH] IN BLOOD BY AUTOMATED COUNT: 13.3 % (ref 11.5–14.5)
EST. GFR  (AFRICAN AMERICAN): >60 ML/MIN/1.73 M^2
EST. GFR  (NON AFRICAN AMERICAN): >60 ML/MIN/1.73 M^2
GLUCOSE SERPL-MCNC: 236 MG/DL (ref 70–110)
HCT VFR BLD AUTO: 33.2 % (ref 40–54)
HCT VFR BLD AUTO: 33.6 % (ref 40–54)
HGB BLD-MCNC: 11 G/DL (ref 14–18)
HGB BLD-MCNC: 11.2 G/DL (ref 14–18)
MAGNESIUM SERPL-MCNC: 1.8 MG/DL (ref 1.6–2.6)
MCH RBC QN AUTO: 27.9 PG (ref 27–31)
MCH RBC QN AUTO: 28.5 PG (ref 27–31)
MCHC RBC AUTO-ENTMCNC: 32.7 G/DL (ref 32–36)
MCHC RBC AUTO-ENTMCNC: 33.7 G/DL (ref 32–36)
MCV RBC AUTO: 85 FL (ref 82–98)
MCV RBC AUTO: 85 FL (ref 82–98)
PHOSPHATE SERPL-MCNC: 2.4 MG/DL (ref 2.7–4.5)
PLATELET # BLD AUTO: 251 K/UL (ref 150–450)
PLATELET # BLD AUTO: 259 K/UL (ref 150–450)
PMV BLD AUTO: 9.8 FL (ref 9.2–12.9)
PMV BLD AUTO: 9.9 FL (ref 9.2–12.9)
POCT GLUCOSE: 188 MG/DL (ref 70–110)
POCT GLUCOSE: 201 MG/DL (ref 70–110)
POCT GLUCOSE: 218 MG/DL (ref 70–110)
POCT GLUCOSE: 235 MG/DL (ref 70–110)
POCT GLUCOSE: 270 MG/DL (ref 70–110)
POCT GLUCOSE: 299 MG/DL (ref 70–110)
POTASSIUM SERPL-SCNC: 3.9 MMOL/L (ref 3.5–5.1)
PROT SERPL-MCNC: 5.3 G/DL (ref 6–8.4)
RBC # BLD AUTO: 3.93 M/UL (ref 4.6–6.2)
RBC # BLD AUTO: 3.94 M/UL (ref 4.6–6.2)
SODIUM SERPL-SCNC: 132 MMOL/L (ref 136–145)
WBC # BLD AUTO: 9.06 K/UL (ref 3.9–12.7)
WBC # BLD AUTO: 9.53 K/UL (ref 3.9–12.7)

## 2021-12-31 PROCEDURE — 25000003 PHARM REV CODE 250: Performed by: STUDENT IN AN ORGANIZED HEALTH CARE EDUCATION/TRAINING PROGRAM

## 2021-12-31 PROCEDURE — 84100 ASSAY OF PHOSPHORUS: CPT | Performed by: STUDENT IN AN ORGANIZED HEALTH CARE EDUCATION/TRAINING PROGRAM

## 2021-12-31 PROCEDURE — 36415 COLL VENOUS BLD VENIPUNCTURE: CPT | Performed by: STUDENT IN AN ORGANIZED HEALTH CARE EDUCATION/TRAINING PROGRAM

## 2021-12-31 PROCEDURE — 63600175 PHARM REV CODE 636 W HCPCS

## 2021-12-31 PROCEDURE — 27000221 HC OXYGEN, UP TO 24 HOURS

## 2021-12-31 PROCEDURE — 63600175 PHARM REV CODE 636 W HCPCS: Performed by: NURSE PRACTITIONER

## 2021-12-31 PROCEDURE — 20600001 HC STEP DOWN PRIVATE ROOM

## 2021-12-31 PROCEDURE — 99222 PR INITIAL HOSPITAL CARE,LEVL II: ICD-10-PCS | Mod: ,,, | Performed by: NURSE PRACTITIONER

## 2021-12-31 PROCEDURE — 94761 N-INVAS EAR/PLS OXIMETRY MLT: CPT

## 2021-12-31 PROCEDURE — 83735 ASSAY OF MAGNESIUM: CPT | Performed by: STUDENT IN AN ORGANIZED HEALTH CARE EDUCATION/TRAINING PROGRAM

## 2021-12-31 PROCEDURE — 99222 1ST HOSP IP/OBS MODERATE 55: CPT | Mod: ,,, | Performed by: NURSE PRACTITIONER

## 2021-12-31 PROCEDURE — 99900035 HC TECH TIME PER 15 MIN (STAT)

## 2021-12-31 PROCEDURE — 80053 COMPREHEN METABOLIC PANEL: CPT | Performed by: STUDENT IN AN ORGANIZED HEALTH CARE EDUCATION/TRAINING PROGRAM

## 2021-12-31 PROCEDURE — 82248 BILIRUBIN DIRECT: CPT | Performed by: STUDENT IN AN ORGANIZED HEALTH CARE EDUCATION/TRAINING PROGRAM

## 2021-12-31 PROCEDURE — 85027 COMPLETE CBC AUTOMATED: CPT | Performed by: STUDENT IN AN ORGANIZED HEALTH CARE EDUCATION/TRAINING PROGRAM

## 2021-12-31 RX ORDER — INSULIN ASPART 100 [IU]/ML
2 INJECTION, SOLUTION INTRAVENOUS; SUBCUTANEOUS
Status: DISCONTINUED | OUTPATIENT
Start: 2022-01-01 | End: 2022-01-01

## 2021-12-31 RX ORDER — INSULIN ASPART 100 [IU]/ML
2 INJECTION, SOLUTION INTRAVENOUS; SUBCUTANEOUS
Status: DISCONTINUED | OUTPATIENT
Start: 2021-12-31 | End: 2022-01-01

## 2021-12-31 RX ORDER — GLUCAGON 1 MG
1 KIT INJECTION
Status: DISCONTINUED | OUTPATIENT
Start: 2021-12-31 | End: 2022-01-06

## 2021-12-31 RX ORDER — INSULIN ASPART 100 [IU]/ML
0-5 INJECTION, SOLUTION INTRAVENOUS; SUBCUTANEOUS EVERY 4 HOURS PRN
Status: DISCONTINUED | OUTPATIENT
Start: 2021-12-31 | End: 2022-01-06

## 2021-12-31 RX ORDER — DEXTROSE MONOHYDRATE 100 MG/ML
INJECTION, SOLUTION INTRAVENOUS CONTINUOUS PRN
Status: DISCONTINUED | OUTPATIENT
Start: 2021-12-31 | End: 2022-01-06

## 2021-12-31 RX ADMIN — INSULIN ASPART 2 UNITS: 100 INJECTION, SOLUTION INTRAVENOUS; SUBCUTANEOUS at 05:12

## 2021-12-31 RX ADMIN — INSULIN ASPART 2 UNITS: 100 INJECTION, SOLUTION INTRAVENOUS; SUBCUTANEOUS at 01:12

## 2021-12-31 RX ADMIN — DILTIAZEM HYDROCHLORIDE 30 MG: 30 TABLET, FILM COATED ORAL at 06:12

## 2021-12-31 RX ADMIN — DILTIAZEM HYDROCHLORIDE 30 MG: 30 TABLET, FILM COATED ORAL at 01:12

## 2021-12-31 RX ADMIN — INSULIN ASPART 3 UNITS: 100 INJECTION, SOLUTION INTRAVENOUS; SUBCUTANEOUS at 01:12

## 2021-12-31 RX ADMIN — INSULIN ASPART 2 UNITS: 100 INJECTION, SOLUTION INTRAVENOUS; SUBCUTANEOUS at 09:12

## 2021-12-31 RX ADMIN — FAMOTIDINE 20 MG: 20 TABLET ORAL at 09:12

## 2021-12-31 RX ADMIN — ENOXAPARIN SODIUM 40 MG: 40 INJECTION SUBCUTANEOUS at 05:12

## 2021-12-31 RX ADMIN — DILTIAZEM HYDROCHLORIDE 30 MG: 30 TABLET, FILM COATED ORAL at 05:12

## 2021-12-31 RX ADMIN — INSULIN ASPART 3 UNITS: 100 INJECTION, SOLUTION INTRAVENOUS; SUBCUTANEOUS at 06:12

## 2021-12-31 RX ADMIN — INSULIN ASPART 1 UNITS: 100 INJECTION, SOLUTION INTRAVENOUS; SUBCUTANEOUS at 12:12

## 2021-12-31 RX ADMIN — INSULIN ASPART 1 UNITS: 100 INJECTION, SOLUTION INTRAVENOUS; SUBCUTANEOUS at 09:12

## 2021-12-31 NOTE — RESPIRATORY THERAPY
RAPID RESPONSE RESPIRATORY THERAPY PROACTIVE NOTE           Time of visit: 1055     Code Status: Full Code   : 1950  Bed: 1043/1043 A:   MRN: [id number]  Time spent at the bedside: < 15 min    SITUATION    Evaluated patient for: LDA Check     BACKGROUND    Patient has a past medical history of Allergy, Atrial fibrillation, Chronic anticoagulation, Diabetes mellitus, type 2, Hypertension, and Larynx neoplasm malignant.  Clinically Significant Surgical Hx: tracheostomy    24 Hours Vitals Range:  Temp:  [98.1 °F (36.7 °C)-99.6 °F (37.6 °C)]   Pulse:  []   Resp:  [17-20]   BP: (123-139)/(63-72)   SpO2:  [95 %-100 %]     Labs:    Recent Labs     21  0417 21  0613 21  0406   * 134* 132*   K 4.4 4.5 3.9    97 100   CO2 25 27 23   CREATININE 0.8 0.9 0.8   * 196* 236*   PHOS 2.9 2.7 2.4*   MG 1.7 2.0 1.8        No results for input(s): PH, PCO2, PO2, HCO3, POCSATURATED, BE in the last 72 hours.    ASSESSMENT/INTERVENTIONS    Upon arrival in room pt resting no distress noted trach supplies at bedside    Last VS   Temp: 98.1 °F (36.7 °C) (1131)  Pulse: 78 (1131)  Resp: 18 (1131)  BP: 128/70 (1131)  SpO2: 98 % (1131)    Level of Consciousness: Level of Consciousness (AVPU): alert  Respiratory Effort: Respiratory Effort: Normal,Unlabored Expansion/Accessory Muscle Usage: Expansion/Accessory Muscles/Retractions: no use of accessory muscles,no retractions,expansion symmetric  All Lung Field Breath Sounds: All Lung Fields Breath Sounds: Anterior:,Lateral:,diminished  EVER Breath Sounds: diminished  LLL Breath Sounds: diminished  RUL Breath Sounds: diminished  RML Breath Sounds: diminished  RLL Breath Sounds: diminished  O2 Device/Concentration: Flow (L/min): 5, Oxygen Concentration (%): 28, O2 Device (Oxygen Therapy): Trach Collar  Surgical airway: Yes, Type: Shiley Size: 6   Ambu at bedside: Ambu bag with the patient?: Yes, Adult Ambu     Active Orders    Respiratory Care    Inhalation Treatment Q4H PRN     Frequency: Q4H PRN     Number of Occurrences: Until Specified    Oxygen Continuous     Frequency: Continuous     Number of Occurrences: Until Specified     Order Questions:      Device type: Low flow      Device: Trach Collar      Titrate O2 per Oxygen Titration Protocol: Yes      To maintain SpO2 goal of: >= 90%      Notify MD of: Inability to achieve desired SpO2; Sudden change in patient status and requires 20% increase in FiO2; Patient requires >60% FiO2    Pulse Oximetry Continuous     Frequency: Continuous     Number of Occurrences: Until Specified    Routine tracheostomy care     Frequency: BID     Number of Occurrences: Until Specified       RECOMMENDATIONS    We recommend: RRT Recs: Continue POC per primary team.    ESCALATION        FOLLOW-UP    Please call back the Rapid Response RT, Caro Perez RRT at x [phone] for any questions or concerns.

## 2021-12-31 NOTE — PROGRESS NOTES
[facility]  Otorhinolaryngology-Head & Neck Surgery  Progress Note    Subjective:     Post-Op Info:  Procedure(s) (LRB):  CREATION, TRACHEOSTOMY (N/A)  LARYNGOSCOPY, DIRECT, WITH BRONCHOSCOPY (N/A)  INSERTION, PEG TUBE (N/A)   4 Days Post-Op  Hospital Day: 7     Interval History: NAEON. Denies significant abdominal pain. Reports some discomfort at Gtube site, but otherwise no complaints this AM. Amenable to staying inpatient until laryngectomy.     Medications:  Continuous Infusions:  Scheduled Meds:   diltiaZEM  30 mg Per G Tube Q6H    enoxaparin  40 mg Subcutaneous Daily    famotidine  20 mg Per G Tube BID     PRN Meds:sodium chloride, sodium chloride 0.9%, acetaminophen, calcium gluconate IVPB, calcium gluconate IVPB, calcium gluconate IVPB, dextrose 50%, glucagon (human recombinant), insulin aspart U-100, magnesium sulfate IVPB, magnesium sulfate IVPB, oxyCODONE, potassium bicarbonate, potassium bicarbonate, potassium bicarbonate, sodium chloride 0.9%, sodium chloride 0.9%     Review of patient's allergies indicates:   Allergen Reactions    Pollen extracts     Lovastatin Rash     Not confirmed but pt skeptical     Objective:     Vital Signs (24h Range):  Temp:  [98.1 °F (36.7 °C)-99.6 °F (37.6 °C)] 98.7 °F (37.1 °C)  Pulse:  [] 81  Resp:  [17-20] 17  SpO2:  [95 %-100 %] 100 %  BP: (123-138)/(63-91) 130/68       Lines/Drains/Airways     Drain                 Gastrostomy/Enterostomy 12/27/21 1152 Percutaneous endoscopic gastrostomy (PEG) LUQ 3 days          Airway                 Surgical Airway 12/27/21 1231 Shiley Cuffed 3 days          Peripheral Intravenous Line                 Peripheral IV - Single Lumen 12/26/21 0800 20 G Left;Posterior Forearm 5 days         Peripheral IV - Single Lumen 12/26/21 0937 20 G Anterior;Right Upper Arm 4 days                Physical Exam   NAD  6-0 DCS sutured in place, cuff down this morning  No peristomal blood, secretions white, no blood.   Able to phonate  with finger occlusion, primarily communicates with lip reading  Post-radiation fibrosis of neck       Significant Labs:  CBC:   Recent Labs   Lab 12/30/21  1935   WBC 8.60   RBC 4.00*   HGB 11.3*   HCT 34.0*      MCV 85   MCH 28.3   MCHC 33.2     CMP:   Recent Labs   Lab 12/31/21  0406   *   CALCIUM 9.3   ALBUMIN 2.2*   PROT 5.3*   *   K 3.9   CO2 23      BUN 25*   CREATININE 0.8   ALKPHOS 374*   *   *   BILITOT 2.5*     All pertinent labs from the last 24 hours have been reviewed.    Significant Diagnostics:  I have reviewed all pertinent imaging results/findings within the past 24 hours.    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    S/p DL/trach/PEG on 12/27. Awaiting definitive laryngectomy. Trach stoma placed superiorly through the tumor. Amenable to staying inpatient until planned laryngectomy. Elevated LFTs 12/30, now downtrending, pt asymptomatic.     - Keep NPO, mIVF   - G-tube instructions per GS  - Cont tube feeds   - Discontinue neb TXA  - Home meds restarted otherwise  - q12h H/H, Daily labs   - s/p 4U pRBC this admit  - Please call or page ENT with any concerns      Ronaldo Hyatt MD  Otorhinolaryngology-Head & Neck Surgery  [facility]

## 2021-12-31 NOTE — PLAN OF CARE
POC reviewed with pt, verbalized understanding. VSS on trach collar,8L, 35%. AAOX4. Remains free of falls and injury.    -Tube feeds @45ml/hr, tolerating well     NPO, denies nausea. Pain controlled. Telemetry, NSR. ACCU ACHS and covered with correctional dose. . Pt denies chest pain and SOB. No acute events or distress noted. Bed in lowest position, call light in reach, frequent rounds made for safety.

## 2021-12-31 NOTE — SUBJECTIVE & OBJECTIVE
Interval HPI:   Overnight events: Remains in GISSU. POD 4. BG above goal ranges on prn SQ insulin correction scale. Diet NPO  Eating:   NPO  Nausea: No  Hypoglycemia and intervention: No  Fever: No  TPN and/or TF: Yes  If yes, type of TF/TPN and rate: Impact Peptide 1.5 at 45 ml/hr (goal rate)     PMH, PSH, FH, SH reviewed     ROS:  Constitutional: Negative for weight changes.  Eyes: Negative for visual disturbance.  Respiratory: Negative for cough.   Cardiovascular: Negative for chest pain.  Gastrointestinal: Negative for nausea.  Endocrine: Negative for polyuria, polydipsia.  Musculoskeletal: Negative for back pain.  Skin: Negative for rash.  Neurological: Negative for syncope.  Psychiatric/Behavioral: Negative for depression.    Review of Systems    Current Medications and/or Treatments Impacting Glycemic Control  Immunotherapy:    Immunosuppressants     None        Steroids:   Hormones (From admission, onward)            None        Pressors:    Autonomic Drugs (From admission, onward)            None        Hyperglycemia/Diabetes Medications:   Antihyperglycemics (From admission, onward)            Start     Stop Route Frequency Ordered    12/26/21 1305  insulin aspart U-100 pen 0-5 Units         -- SubQ Every 6 hours PRN 12/26/21 1206             PHYSICAL EXAMINATION:  Vitals:    12/31/21 0844   BP: 139/72   Pulse: 80   Resp: 17   Temp: 98.8 °F (37.1 °C)     Body mass index is 23.48 kg/m².    Physical Exam   Constitutional: Well developed, well nourished, NAD.  ENT: External ears no masses with nose patent; normal hearing.  Neck: Supple; trachea midline.  Cardiovascular: Normal heart sounds, no LE edema. DP +2 bilaterally.  Lungs: Normal effort; lungs anterior bilaterally clear to auscultation.  Abdomen: Soft, no masses, no hernias.  MS: No clubbing or cyanosis of nails noted;  unable to assess gait.  Skin: No rashes, lesions, or ulcers; no nodules.   Psychiatric: Good judgement and insight; normal mood and  affect.  Neurological: Cranial nerves are grossly intact.   Foot: Nails in good condition, no amputations noted.

## 2021-12-31 NOTE — PLAN OF CARE
POC reviewed. AAOx4, VSS on 8L O2 35% #4 shiley cuffed trach collar. No complaints of SOB, headaches, or dizziness. Patient able to cough and clear mucus through trach. Changed trach dressing five times during shift. PO meds crushed and given per PEG tube. Urine output per bedside urinal adequate. Tolerating being NPO and tube feedings Impact Peptide 1.5 at 45 mL/hr. No BM and no complaints of N/V. Patient denies pain. Blood glucose monitored q6h with Aspart Insulin coverage. Resting with side rails up and call light with in reach. Continuing to monitor patient status.

## 2021-12-31 NOTE — SUBJECTIVE & OBJECTIVE
Interval History: NAEON. Denies significant abdominal pain. Reports some discomfort at Gtube site, but otherwise no complaints this AM. Amenable to staying inpatient until laryngectomy.     Medications:  Continuous Infusions:  Scheduled Meds:   diltiaZEM  30 mg Per G Tube Q6H    enoxaparin  40 mg Subcutaneous Daily    famotidine  20 mg Per G Tube BID     PRN Meds:sodium chloride, sodium chloride 0.9%, acetaminophen, calcium gluconate IVPB, calcium gluconate IVPB, calcium gluconate IVPB, dextrose 50%, glucagon (human recombinant), insulin aspart U-100, magnesium sulfate IVPB, magnesium sulfate IVPB, oxyCODONE, potassium bicarbonate, potassium bicarbonate, potassium bicarbonate, sodium chloride 0.9%, sodium chloride 0.9%     Review of patient's allergies indicates:   Allergen Reactions    Pollen extracts     Lovastatin Rash     Not confirmed but pt skeptical     Objective:     Vital Signs (24h Range):  Temp:  [98.1 °F (36.7 °C)-99.6 °F (37.6 °C)] 98.7 °F (37.1 °C)  Pulse:  [] 81  Resp:  [17-20] 17  SpO2:  [95 %-100 %] 100 %  BP: (123-138)/(63-91) 130/68       Lines/Drains/Airways     Drain                 Gastrostomy/Enterostomy 12/27/21 1152 Percutaneous endoscopic gastrostomy (PEG) LUQ 3 days          Airway                 Surgical Airway 12/27/21 1231 Shiley Cuffed 3 days          Peripheral Intravenous Line                 Peripheral IV - Single Lumen 12/26/21 0800 20 G Left;Posterior Forearm 5 days         Peripheral IV - Single Lumen 12/26/21 0937 20 G Anterior;Right Upper Arm 4 days                Physical Exam   NAD  6-0 DCS sutured in place, cuff down this morning  No peristomal blood, secretions white, no blood.   Able to phonate with finger occlusion, primarily communicates with lip reading  Post-radiation fibrosis of neck       Significant Labs:  CBC:   Recent Labs   Lab 12/30/21  1935   WBC 8.60   RBC 4.00*   HGB 11.3*   HCT 34.0*      MCV 85   MCH 28.3   MCHC 33.2     CMP:   Recent  Labs   Lab 12/31/21  0406   *   CALCIUM 9.3   ALBUMIN 2.2*   PROT 5.3*   *   K 3.9   CO2 23      BUN 25*   CREATININE 0.8   ALKPHOS 374*   *   *   BILITOT 2.5*     All pertinent labs from the last 24 hours have been reviewed.    Significant Diagnostics:  I have reviewed all pertinent imaging results/findings within the past 24 hours.

## 2021-12-31 NOTE — HPI
Reason for Consult: Management of T2DM, Hyperglycemia     Surgical Procedure and Date:   12/27/21  CREATION, TRACHEOSTOMY (N/A)  LARYNGOSCOPY, DIRECT, WITH BRONCHOSCOPY (N/A)  INSERTION, PEG TUBE (N/A)     Diabetes diagnosis year: 2010    Home Diabetes Medications:  Ozempic stopped on 10/18/21 by Dena Silveira as a1c below goal     How often checking glucose at home?  Once daily    BG readings on regimen: 150s  Hypoglycemia on the regimen?  No  Missed doses on regimen?  n/a    Diabetes Complications include:     None     Complicating diabetes co morbidities:   pAfib, HTN, active cancer       HPI:   Patient is a 71 y.o. 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 He presented as a transfer from [place] for ENT evaluation. He initially presented to the ER for worsening hemoptysis. Laryngeal videostroboscopy 11/8/21 notable for webbing across membranous vocal folds, granular changes infraglottically, firbinous debris across anterior commissure, post surgical stiffness of bilateral true vocal folds, phonatory supraglottic hyperfunction, occasional plica ventricularis, thick salivary secretions, pooled in pyriforms, diffuse mild laryngopharyngeal edema.He is now s/p the above procedure. He was last seen by MISSY Gutierrez on 10/18/21 for DM management. Endocrinology consulted for management of T2DM.            Lab Results   Component Value Date    HGBA1C 5.5 10/05/2021

## 2021-12-31 NOTE — PLAN OF CARE
POC reviewed with pt, verbalized understanding. VSS on RA/ 5L 28%. AAOX4. Remains free of falls and injury. Ambulated hallways.     - Trach # 4 shiley  - Peg tube site, CDI   - Impact Peptide 1.5 @ 45; tolerating well    Tolerating diet, denies nausea. No complaints of pain. Tele, NSR. ACCU q4h. IS/Acapella bedside. Pt denies chest pain & SOB. TEDs/SCDs in place. No acute events or distress noted. Bed in lowest position, call light in reach, frequent rounds made for safety.    WCTM.      Problem: Adult Inpatient Plan of Care  Goal: Readiness for Transition of Care  Outcome: Ongoing, Progressing     Problem: Fall Injury Risk  Goal: Absence of Fall and Fall-Related Injury  Outcome: Ongoing, Progressing     Problem: Skin Injury Risk Increased  Goal: Skin Health and Integrity  Outcome: Ongoing, Progressing

## 2021-12-31 NOTE — CONSULTS
[facility]  Endocrinology  Diabetes Consult Note    Consult Requested by: Flex Espinosa MD   Reason for admit: Larynx neoplasm malignant    HISTORY OF PRESENT ILLNESS:  Reason for Consult: Management of T2DM, Hyperglycemia     Surgical Procedure and Date:   12/27/21  CREATION, TRACHEOSTOMY (N/A)  LARYNGOSCOPY, DIRECT, WITH BRONCHOSCOPY (N/A)  INSERTION, PEG TUBE (N/A)     Diabetes diagnosis year: 2010    Home Diabetes Medications:  Ozempic stopped on 10/18/21 by Dena Silveira as a1c below goal     How often checking glucose at home?  Once daily    BG readings on regimen: 150s  Hypoglycemia on the regimen?  No  Missed doses on regimen?  n/a    Diabetes Complications include:     None     Complicating diabetes co morbidities:   pAfib, HTN, active cancer       HPI:   Patient is a 71 y.o. 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 He presented as a transfer from [place] for ENT evaluation. He initially presented to the ER for worsening hemoptysis. Laryngeal videostroboscopy 11/8/21 notable for webbing across membranous vocal folds, granular changes infraglottically, firbinous debris across anterior commissure, post surgical stiffness of bilateral true vocal folds, phonatory supraglottic hyperfunction, occasional plica ventricularis, thick salivary secretions, pooled in pyriforms, diffuse mild laryngopharyngeal edema.He is now s/p the above procedure. He was last seen by MISSY Gutierrez on 10/18/21 for DM management. Endocrinology consulted for management of T2DM.            Lab Results   Component Value Date    HGBA1C 5.5 10/05/2021           Interval HPI:   Overnight events: Remains in [facility]. POD 4. BG above goal ranges on prn SQ insulin correction scale. Diet NPO  Eating:   NPO  Nausea: No  Hypoglycemia and intervention: No  Fever: No  TPN and/or TF: Yes  If yes, type of TF/TPN and rate: Impact Peptide 1.5 at 45 ml/hr  (goal rate)     PMH, PSH, FH, SH reviewed     ROS:  Constitutional: Negative for weight changes.  Eyes: Negative for visual disturbance.  Respiratory: Negative for cough.   Cardiovascular: Negative for chest pain.  Gastrointestinal: Negative for nausea.  Endocrine: Negative for polyuria, polydipsia.  Musculoskeletal: Negative for back pain.  Skin: Negative for rash.  Neurological: Negative for syncope.  Psychiatric/Behavioral: Negative for depression.    Review of Systems    Current Medications and/or Treatments Impacting Glycemic Control  Immunotherapy:    Immunosuppressants     None        Steroids:   Hormones (From admission, onward)            None        Pressors:    Autonomic Drugs (From admission, onward)            None        Hyperglycemia/Diabetes Medications:   Antihyperglycemics (From admission, onward)            Start     Stop Route Frequency Ordered    12/26/21 1305  insulin aspart U-100 pen 0-5 Units         -- SubQ Every 6 hours PRN 12/26/21 1206             PHYSICAL EXAMINATION:  Vitals:    12/31/21 0844   BP: 139/72   Pulse: 80   Resp: 17   Temp: 98.8 °F (37.1 °C)     Body mass index is 23.48 kg/m².    Physical Exam   Constitutional: Well developed, well nourished, NAD.  ENT: External ears no masses with nose patent; normal hearing.  Neck: Supple; trachea midline.  Cardiovascular: Normal heart sounds, no LE edema. DP +2 bilaterally.  Lungs: Normal effort; lungs anterior bilaterally clear to auscultation.  Abdomen: Soft, no masses, no hernias.  MS: No clubbing or cyanosis of nails noted;  unable to assess gait.  Skin: No rashes, lesions, or ulcers; no nodules.   Psychiatric: Good judgement and insight; normal mood and affect.  Neurological: Cranial nerves are grossly intact.   Foot: Nails in good condition, no amputations noted.        Labs Reviewed and Include   Recent Labs   Lab 12/31/21  0406   *   CALCIUM 9.3   ALBUMIN 2.2*   PROT 5.3*   *   K 3.9   CO2 23      BUN 25*    CREATININE 0.8   ALKPHOS 374*   *   *   BILITOT 2.5*     Lab Results   Component Value Date    WBC 9.06 12/31/2021    HGB 11.2 (L) 12/31/2021    HCT 33.2 (L) 12/31/2021    MCV 85 12/31/2021     12/31/2021     No results for input(s): TSH, FREET4 in the last 168 hours.  Lab Results   Component Value Date    HGBA1C 5.5 10/05/2021       Nutritional status:   Body mass index is 23.48 kg/m².  Lab Results   Component Value Date    ALBUMIN 2.2 (L) 12/31/2021    ALBUMIN 2.9 (L) 12/30/2021    ALBUMIN 2.6 (L) 12/29/2021     No results found for: PREALBUMIN    Estimated Creatinine Clearance: 77.5 mL/min (based on SCr of 0.8 mg/dL).    Accu-Checks  Recent Labs     12/29/21  0529 12/29/21  1403 12/29/21  1404 12/29/21  1740 12/29/21  2322 12/30/21  1256 12/30/21  1549 12/30/21  1639 12/31/21  0018 12/31/21  0630   POCTGLUCOSE 174* 246* 227* 224* 185* 196* 239* 226* 201* 299*        ASSESSMENT and PLAN    * Larynx neoplasm malignant  Managed per primary team  Optimize BG control        Type 2 diabetes mellitus with hyperglycemia, without long-term current use of insulin  BG goal 140-180    Start Novolog 2 units q 4 hrs while on tube feeds (HOLD if tube feeds are stopped or BG < 100)  Continue Low Dose Correction Scale  BG monitoring q 4 hrs while NPO/tube feeds    ** Please call Endocrine for any BG related issues **      On enteral nutrition  Enteral nutrition may increase blood glucose.  Optimize BG control          Plan discussed with patient, family, and RN at bedside.     Destini Guy, NP  Endocrinology  [facility]

## 2021-12-31 NOTE — ASSESSMENT & PLAN NOTE
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    S/p DL/trach/PEG on 12/27. Awaiting definitive laryngectomy. Trach stoma placed superiorly through the tumor. Amenable to staying inpatient until planned laryngectomy. Elevated LFTs 12/30, now downtrending, pt asymptomatic.     - Keep NPO, mIVF   - G-tube instructions per GS  - Cont tube feeds   - Discontinue neb TXA  - Home meds restarted otherwise  - q12h H/H, Daily labs   - s/p 4U pRBC this admit  - Please call or page ENT with any concerns

## 2021-12-31 NOTE — ASSESSMENT & PLAN NOTE
BG goal 140-180    Start Novolog 2 units q 4 hrs while on tube feeds (HOLD if tube feeds are stopped or BG < 100)  Continue Low Dose Correction Scale  BG monitoring q 4 hrs while NPO/tube feeds    ** Please call Endocrine for any BG related issues **

## 2022-01-01 LAB
ALBUMIN SERPL BCP-MCNC: 2.2 G/DL (ref 3.5–5.2)
ALP SERPL-CCNC: 360 U/L (ref 55–135)
ALT SERPL W/O P-5'-P-CCNC: 146 U/L (ref 10–44)
ANION GAP SERPL CALC-SCNC: 5 MMOL/L (ref 8–16)
AST SERPL-CCNC: 49 U/L (ref 10–40)
BILIRUB SERPL-MCNC: 1.3 MG/DL (ref 0.1–1)
BUN SERPL-MCNC: 28 MG/DL (ref 8–23)
CALCIUM SERPL-MCNC: 9.8 MG/DL (ref 8.7–10.5)
CHLORIDE SERPL-SCNC: 100 MMOL/L (ref 95–110)
CO2 SERPL-SCNC: 26 MMOL/L (ref 23–29)
CREAT SERPL-MCNC: 0.8 MG/DL (ref 0.5–1.4)
ERYTHROCYTE [DISTWIDTH] IN BLOOD BY AUTOMATED COUNT: 13.5 % (ref 11.5–14.5)
ERYTHROCYTE [DISTWIDTH] IN BLOOD BY AUTOMATED COUNT: 13.6 % (ref 11.5–14.5)
EST. GFR  (AFRICAN AMERICAN): >60 ML/MIN/1.73 M^2
EST. GFR  (NON AFRICAN AMERICAN): >60 ML/MIN/1.73 M^2
GLUCOSE SERPL-MCNC: 218 MG/DL (ref 70–110)
HCT VFR BLD AUTO: 34.6 % (ref 40–54)
HCT VFR BLD AUTO: 35.2 % (ref 40–54)
HGB BLD-MCNC: 11.4 G/DL (ref 14–18)
HGB BLD-MCNC: 11.5 G/DL (ref 14–18)
MAGNESIUM SERPL-MCNC: 1.9 MG/DL (ref 1.6–2.6)
MCH RBC QN AUTO: 27.9 PG (ref 27–31)
MCH RBC QN AUTO: 28.8 PG (ref 27–31)
MCHC RBC AUTO-ENTMCNC: 32.4 G/DL (ref 32–36)
MCHC RBC AUTO-ENTMCNC: 33.2 G/DL (ref 32–36)
MCV RBC AUTO: 86 FL (ref 82–98)
MCV RBC AUTO: 87 FL (ref 82–98)
PHOSPHATE SERPL-MCNC: 2.8 MG/DL (ref 2.7–4.5)
PLATELET # BLD AUTO: 311 K/UL (ref 150–450)
PLATELET # BLD AUTO: 319 K/UL (ref 150–450)
PMV BLD AUTO: 10.2 FL (ref 9.2–12.9)
PMV BLD AUTO: 9.8 FL (ref 9.2–12.9)
POCT GLUCOSE: 174 MG/DL (ref 70–110)
POCT GLUCOSE: 179 MG/DL (ref 70–110)
POCT GLUCOSE: 203 MG/DL (ref 70–110)
POCT GLUCOSE: 210 MG/DL (ref 70–110)
POCT GLUCOSE: 254 MG/DL (ref 70–110)
POCT GLUCOSE: 254 MG/DL (ref 70–110)
POTASSIUM SERPL-SCNC: 4.1 MMOL/L (ref 3.5–5.1)
PROT SERPL-MCNC: 5.5 G/DL (ref 6–8.4)
RBC # BLD AUTO: 3.99 M/UL (ref 4.6–6.2)
RBC # BLD AUTO: 4.08 M/UL (ref 4.6–6.2)
SODIUM SERPL-SCNC: 131 MMOL/L (ref 136–145)
WBC # BLD AUTO: 8.52 K/UL (ref 3.9–12.7)
WBC # BLD AUTO: 9.22 K/UL (ref 3.9–12.7)

## 2022-01-01 PROCEDURE — 63600175 PHARM REV CODE 636 W HCPCS

## 2022-01-01 PROCEDURE — 85027 COMPLETE CBC AUTOMATED: CPT | Performed by: STUDENT IN AN ORGANIZED HEALTH CARE EDUCATION/TRAINING PROGRAM

## 2022-01-01 PROCEDURE — 25000003 PHARM REV CODE 250: Performed by: STUDENT IN AN ORGANIZED HEALTH CARE EDUCATION/TRAINING PROGRAM

## 2022-01-01 PROCEDURE — 94761 N-INVAS EAR/PLS OXIMETRY MLT: CPT

## 2022-01-01 PROCEDURE — 36415 COLL VENOUS BLD VENIPUNCTURE: CPT | Performed by: STUDENT IN AN ORGANIZED HEALTH CARE EDUCATION/TRAINING PROGRAM

## 2022-01-01 PROCEDURE — 83735 ASSAY OF MAGNESIUM: CPT | Performed by: STUDENT IN AN ORGANIZED HEALTH CARE EDUCATION/TRAINING PROGRAM

## 2022-01-01 PROCEDURE — 94760 N-INVAS EAR/PLS OXIMETRY 1: CPT

## 2022-01-01 PROCEDURE — 31720 CLEARANCE OF AIRWAYS: CPT

## 2022-01-01 PROCEDURE — 36415 COLL VENOUS BLD VENIPUNCTURE: CPT | Mod: HCNC | Performed by: STUDENT IN AN ORGANIZED HEALTH CARE EDUCATION/TRAINING PROGRAM

## 2022-01-01 PROCEDURE — 99900022

## 2022-01-01 PROCEDURE — 80053 COMPREHEN METABOLIC PANEL: CPT | Performed by: STUDENT IN AN ORGANIZED HEALTH CARE EDUCATION/TRAINING PROGRAM

## 2022-01-01 PROCEDURE — 99900035 HC TECH TIME PER 15 MIN (STAT)

## 2022-01-01 PROCEDURE — 20600001 HC STEP DOWN PRIVATE ROOM: Mod: HCNC

## 2022-01-01 PROCEDURE — 27000221 HC OXYGEN, UP TO 24 HOURS

## 2022-01-01 PROCEDURE — 84100 ASSAY OF PHOSPHORUS: CPT | Performed by: STUDENT IN AN ORGANIZED HEALTH CARE EDUCATION/TRAINING PROGRAM

## 2022-01-01 PROCEDURE — 99232 SBSQ HOSP IP/OBS MODERATE 35: CPT | Mod: ,,, | Performed by: NURSE PRACTITIONER

## 2022-01-01 PROCEDURE — 99232 PR SUBSEQUENT HOSPITAL CARE,LEVL II: ICD-10-PCS | Mod: ,,, | Performed by: NURSE PRACTITIONER

## 2022-01-01 PROCEDURE — 63600175 PHARM REV CODE 636 W HCPCS: Performed by: NURSE PRACTITIONER

## 2022-01-01 RX ORDER — INSULIN ASPART 100 [IU]/ML
3 INJECTION, SOLUTION INTRAVENOUS; SUBCUTANEOUS
Status: DISCONTINUED | OUTPATIENT
Start: 2022-01-02 | End: 2022-01-02

## 2022-01-01 RX ORDER — INSULIN ASPART 100 [IU]/ML
3 INJECTION, SOLUTION INTRAVENOUS; SUBCUTANEOUS
Status: DISCONTINUED | OUTPATIENT
Start: 2022-01-01 | End: 2022-01-02

## 2022-01-01 RX ADMIN — INSULIN ASPART 3 UNITS: 100 INJECTION, SOLUTION INTRAVENOUS; SUBCUTANEOUS at 10:01

## 2022-01-01 RX ADMIN — INSULIN ASPART 3 UNITS: 100 INJECTION, SOLUTION INTRAVENOUS; SUBCUTANEOUS at 05:01

## 2022-01-01 RX ADMIN — DILTIAZEM HYDROCHLORIDE 30 MG: 30 TABLET, FILM COATED ORAL at 05:01

## 2022-01-01 RX ADMIN — INSULIN ASPART 2 UNITS: 100 INJECTION, SOLUTION INTRAVENOUS; SUBCUTANEOUS at 05:01

## 2022-01-01 RX ADMIN — FAMOTIDINE 20 MG: 20 TABLET ORAL at 08:01

## 2022-01-01 RX ADMIN — DILTIAZEM HYDROCHLORIDE 30 MG: 30 TABLET, FILM COATED ORAL at 01:01

## 2022-01-01 RX ADMIN — INSULIN ASPART 3 UNITS: 100 INJECTION, SOLUTION INTRAVENOUS; SUBCUTANEOUS at 08:01

## 2022-01-01 RX ADMIN — DILTIAZEM HYDROCHLORIDE 30 MG: 30 TABLET, FILM COATED ORAL at 12:01

## 2022-01-01 RX ADMIN — INSULIN ASPART 2 UNITS: 100 INJECTION, SOLUTION INTRAVENOUS; SUBCUTANEOUS at 12:01

## 2022-01-01 RX ADMIN — ENOXAPARIN SODIUM 40 MG: 40 INJECTION SUBCUTANEOUS at 05:01

## 2022-01-01 RX ADMIN — INSULIN ASPART 3 UNITS: 100 INJECTION, SOLUTION INTRAVENOUS; SUBCUTANEOUS at 02:01

## 2022-01-01 RX ADMIN — INSULIN ASPART 1 UNITS: 100 INJECTION, SOLUTION INTRAVENOUS; SUBCUTANEOUS at 08:01

## 2022-01-01 RX ADMIN — SODIUM CHLORIDE 500 ML: 0.9 INJECTION, SOLUTION INTRAVENOUS at 10:01

## 2022-01-01 RX ADMIN — FAMOTIDINE 20 MG: 20 TABLET ORAL at 10:01

## 2022-01-01 NOTE — SUBJECTIVE & OBJECTIVE
"Interval HPI:   Overnight events: Remains in [facility]. POD 5. Remains on Tube feeds at goal rate. BG at higher end or above goal ranges on current SQ insulin regimen. Diet NPO  Eating:   NPO  Nausea: No  Hypoglycemia and intervention: No  Fever: No  TPN and/or TF: Yes  If yes, type of TF/TPN and rate: Impact Peptide 1.5 at 45 ml/hr     /67 (BP Location: Right arm, Patient Position: Lying)   Pulse 74   Temp 98.5 °F (36.9 °C) (Oral)   Resp 14   Ht 5' 6" (1.676 m)   Wt 66 kg (145 lb 8.1 oz)   SpO2 98%   BMI 23.48 kg/m²     Labs Reviewed and Include    Recent Labs   Lab 01/01/22  0312   *   CALCIUM 9.8   ALBUMIN 2.2*   PROT 5.5*   *   K 4.1   CO2 26      BUN 28*   CREATININE 0.8   ALKPHOS 360*   *   AST 49*   BILITOT 1.3*     Lab Results   Component Value Date    WBC 9.53 12/31/2021    HGB 11.0 (L) 12/31/2021    HCT 33.6 (L) 12/31/2021    MCV 85 12/31/2021     12/31/2021     No results for input(s): TSH, FREET4 in the last 168 hours.  Lab Results   Component Value Date    HGBA1C 5.5 10/05/2021       Nutritional status:   Body mass index is 23.48 kg/m².  Lab Results   Component Value Date    ALBUMIN 2.2 (L) 01/01/2022    ALBUMIN 2.2 (L) 12/31/2021    ALBUMIN 2.9 (L) 12/30/2021     No results found for: PREALBUMIN    Estimated Creatinine Clearance: 77.5 mL/min (based on SCr of 0.8 mg/dL).    Accu-Checks  Recent Labs     12/30/21  1549 12/30/21  1639 12/31/21  0018 12/31/21  0630 12/31/21  1303 12/31/21  1556 12/31/21  1719 12/31/21  2151 01/01/22  0048 01/01/22  0516   POCTGLUCOSE 239* 226* 201* 299* 270* 188* 218* 235* 179* 254*       Current Medications and/or Treatments Impacting Glycemic Control  Immunotherapy:    Immunosuppressants     None        Steroids:   Hormones (From admission, onward)            None        Pressors:    Autonomic Drugs (From admission, onward)            None        Hyperglycemia/Diabetes Medications:   Antihyperglycemics (From admission, onward)     "        Start     Stop Route Frequency Ordered    01/02/22 0400  insulin aspart U-100 pen 3 Units         -- SubQ Every 24 hours (non-standard times) 01/01/22 0734    01/02/22 0000  insulin aspart U-100 pen 3 Units         -- SubQ Every 24 hours (non-standard times) 01/01/22 0734    01/01/22 2000  insulin aspart U-100 pen 3 Units         -- SubQ Every 24 hours (non-standard times) 01/01/22 0734    01/01/22 1600  insulin aspart U-100 pen 3 Units         -- SubQ Every 24 hours (non-standard times) 01/01/22 0734    01/01/22 1200  insulin aspart U-100 pen 3 Units         -- SubQ Every 24 hours (non-standard times) 01/01/22 0734    01/01/22 0800  insulin aspart U-100 pen 3 Units         -- SubQ Every 24 hours (non-standard times) 01/01/22 0734    12/31/21 1133  insulin aspart U-100 pen 0-5 Units         -- SubQ Every 4 hours PRN 12/31/21 1033

## 2022-01-01 NOTE — SUBJECTIVE & OBJECTIVE
Interval History: NAEON. AFVSS. Tolerating TFs. No new complaints.     Medications:  Continuous Infusions:   dextrose 10 % in water (D10W)       Scheduled Meds:   diltiaZEM  30 mg Per G Tube Q6H    enoxaparin  40 mg Subcutaneous Daily    famotidine  20 mg Per G Tube BID    [START ON 1/2/2022] insulin aspart U-100  3 Units Subcutaneous Q24H    [START ON 1/2/2022] insulin aspart U-100  3 Units Subcutaneous Q24H    insulin aspart U-100  3 Units Subcutaneous Q24H    insulin aspart U-100  3 Units Subcutaneous Q24H    insulin aspart U-100  3 Units Subcutaneous Q24H    insulin aspart U-100  3 Units Subcutaneous Q24H    sodium chloride 0.9%  500 mL Intravenous Once     PRN Meds:acetaminophen, dextrose 10 % in water (D10W), dextrose 50%, glucagon (human recombinant), insulin aspart U-100, oxyCODONE, sodium chloride 0.9%     Review of patient's allergies indicates:   Allergen Reactions    Pollen extracts     Lovastatin Rash     Not confirmed but pt skeptical     Objective:     Vital Signs (24h Range):  Temp:  [96.4 °F (35.8 °C)-98.5 °F (36.9 °C)] 98.5 °F (36.9 °C)  Pulse:  [70-95] 74  Resp:  [14-20] 14  SpO2:  [96 %-99 %] 98 %  BP: (119-135)/(67-74) 122/67       Lines/Drains/Airways     Drain                 Gastrostomy/Enterostomy 12/27/21 1152 Percutaneous endoscopic gastrostomy (PEG) LUQ 4 days          Airway                 Surgical Airway 12/27/21 1231 Shiley Cuffed 4 days          Peripheral Intravenous Line                 Peripheral IV - Single Lumen 12/26/21 0800 20 G Left;Posterior Forearm 6 days         Peripheral IV - Single Lumen 12/26/21 0937 20 G Anterior;Right Upper Arm 6 days                Physical Exam   NAD  6-0 DCS sutured in place, cuff down, sutures removed   No peristomal blood, secretions white, no blood.   Able to phonate with finger occlusion, primarily communicates with lip reading  Post-radiation fibrosis of neck    Significant Labs:  CBC:   Recent Labs   Lab 12/31/21 2023   WBC  9.53   RBC 3.94*   HGB 11.0*   HCT 33.6*      MCV 85   MCH 27.9   MCHC 32.7     None    Significant Diagnostics:  None

## 2022-01-01 NOTE — PROGRESS NOTES
[facility] - [facility]  Endocrinology  Progress Note    Admit Date: 12/25/2021     Reason for Consult: Management of T2DM, Hyperglycemia     Surgical Procedure and Date:   12/27/21  CREATION, TRACHEOSTOMY (N/A)  LARYNGOSCOPY, DIRECT, WITH BRONCHOSCOPY (N/A)  INSERTION, PEG TUBE (N/A)     Diabetes diagnosis year: 2010    Home Diabetes Medications:  Ozempic stopped on 10/18/21 by Dena Silveira as a1c below goal     How often checking glucose at home?  Once daily    BG readings on regimen: 150s  Hypoglycemia on the regimen?  No  Missed doses on regimen?  n/a    Diabetes Complications include:     None     Complicating diabetes co morbidities:   pAfib, HTN, active cancer       HPI:   Patient is a 71 y.o. 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 He presented as a transfer from [place] for ENT evaluation. He initially presented to the ER for worsening hemoptysis. Laryngeal videostroboscopy 11/8/21 notable for webbing across membranous vocal folds, granular changes infraglottically, firbinous debris across anterior commissure, post surgical stiffness of bilateral true vocal folds, phonatory supraglottic hyperfunction, occasional plica ventricularis, thick salivary secretions, pooled in pyriforms, diffuse mild laryngopharyngeal edema.He is now s/p the above procedure. He was last seen by MISSY Gutierrez on 10/18/21 for DM management. Endocrinology consulted for management of T2DM.            Lab Results   Component Value Date    HGBA1C 5.5 10/05/2021           Interval HPI:   Overnight events: Remains in [facility]. POD 5. Remains on Tube feeds at goal rate. BG at higher end or above goal ranges on current SQ insulin regimen. Diet NPO  Eating:   NPO  Nausea: No  Hypoglycemia and intervention: No  Fever: No  TPN and/or TF: Yes  If yes, type of TF/TPN and rate: Impact Peptide 1.5 at 45 ml/hr     /67 (BP Location: Right arm, Patient  "Position: Lying)   Pulse 74   Temp 98.5 °F (36.9 °C) (Oral)   Resp 14   Ht 5' 6" (1.676 m)   Wt 66 kg (145 lb 8.1 oz)   SpO2 98%   BMI 23.48 kg/m²     Labs Reviewed and Include    Recent Labs   Lab 01/01/22 0312   *   CALCIUM 9.8   ALBUMIN 2.2*   PROT 5.5*   *   K 4.1   CO2 26      BUN 28*   CREATININE 0.8   ALKPHOS 360*   *   AST 49*   BILITOT 1.3*     Lab Results   Component Value Date    WBC 9.53 12/31/2021    HGB 11.0 (L) 12/31/2021    HCT 33.6 (L) 12/31/2021    MCV 85 12/31/2021     12/31/2021     No results for input(s): TSH, FREET4 in the last 168 hours.  Lab Results   Component Value Date    HGBA1C 5.5 10/05/2021       Nutritional status:   Body mass index is 23.48 kg/m².  Lab Results   Component Value Date    ALBUMIN 2.2 (L) 01/01/2022    ALBUMIN 2.2 (L) 12/31/2021    ALBUMIN 2.9 (L) 12/30/2021     No results found for: PREALBUMIN    Estimated Creatinine Clearance: 77.5 mL/min (based on SCr of 0.8 mg/dL).    Accu-Checks  Recent Labs     12/30/21  1549 12/30/21  1639 12/31/21  0018 12/31/21  0630 12/31/21  1303 12/31/21  1556 12/31/21  1719 12/31/21  2151 01/01/22  0048 01/01/22  0516   POCTGLUCOSE 239* 226* 201* 299* 270* 188* 218* 235* 179* 254*       Current Medications and/or Treatments Impacting Glycemic Control  Immunotherapy:    Immunosuppressants     None        Steroids:   Hormones (From admission, onward)            None        Pressors:    Autonomic Drugs (From admission, onward)            None        Hyperglycemia/Diabetes Medications:   Antihyperglycemics (From admission, onward)            Start     Stop Route Frequency Ordered    01/02/22 0400  insulin aspart U-100 pen 3 Units         -- SubQ Every 24 hours (non-standard times) 01/01/22 0734 01/02/22 0000  insulin aspart U-100 pen 3 Units         -- SubQ Every 24 hours (non-standard times) 01/01/22 0734    01/01/22 2000  insulin aspart U-100 pen 3 Units         -- SubQ Every 24 hours (non-standard " times) 01/01/22 0734    01/01/22 1600  insulin aspart U-100 pen 3 Units         -- SubQ Every 24 hours (non-standard times) 01/01/22 0734    01/01/22 1200  insulin aspart U-100 pen 3 Units         -- SubQ Every 24 hours (non-standard times) 01/01/22 0734    01/01/22 0800  insulin aspart U-100 pen 3 Units         -- SubQ Every 24 hours (non-standard times) 01/01/22 0734    12/31/21 1133  insulin aspart U-100 pen 0-5 Units         -- SubQ Every 4 hours PRN 12/31/21 1033          ASSESSMENT and PLAN    * Larynx neoplasm malignant  Managed per primary team  Optimize BG control        Type 2 diabetes mellitus with hyperglycemia, without long-term current use of insulin  BG goal 140-180    Increase Novolog to 3 units q 4 hrs while on tube feeds (HOLD if tube feeds are stopped or BG < 100)  Continue Low Dose Correction Scale  BG monitoring q 4 hrs while NPO/tube feeds    ** Please call Endocrine for any BG related issues **    Discharge plans: TBD    On enteral nutrition  Enteral nutrition may increase blood glucose.  Optimize BG control          Destini Guy, NP  Endocrinology  [facility]

## 2022-01-01 NOTE — ASSESSMENT & PLAN NOTE
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    S/p DL/trach/PEG on 12/27. Awaiting definitive laryngectomy. Trach stoma placed superiorly through the tumor. Amenable to staying inpatient until planned laryngectomy. Elevated LFTs 12/30, now downtrending, pt asymptomatic.     - Keep NPO, mIVF   - G-tube instructions per GS  - Cont tube feeds   - Home meds restarted otherwise  - Keep cuffed trach in place leading up to surgery next week  - Please call or page ENT with any concerns

## 2022-01-01 NOTE — PROGRESS NOTES
[facility] - [facility]  Otorhinolaryngology-Head & Neck Surgery  Progress Note    Subjective:     Post-Op Info:  Procedure(s) (LRB):  CREATION, TRACHEOSTOMY (N/A)  LARYNGOSCOPY, DIRECT, WITH BRONCHOSCOPY (N/A)  INSERTION, PEG TUBE (N/A)   5 Days Post-Op  Hospital Day: 8     Interval History: NAEON. AFVSS. Tolerating TFs. No new complaints.     Medications:  Continuous Infusions:   dextrose 10 % in water (D10W)       Scheduled Meds:   diltiaZEM  30 mg Per G Tube Q6H    enoxaparin  40 mg Subcutaneous Daily    famotidine  20 mg Per G Tube BID    [START ON 1/2/2022] insulin aspart U-100  3 Units Subcutaneous Q24H    [START ON 1/2/2022] insulin aspart U-100  3 Units Subcutaneous Q24H    insulin aspart U-100  3 Units Subcutaneous Q24H    insulin aspart U-100  3 Units Subcutaneous Q24H    insulin aspart U-100  3 Units Subcutaneous Q24H    insulin aspart U-100  3 Units Subcutaneous Q24H    sodium chloride 0.9%  500 mL Intravenous Once     PRN Meds:acetaminophen, dextrose 10 % in water (D10W), dextrose 50%, glucagon (human recombinant), insulin aspart U-100, oxyCODONE, sodium chloride 0.9%     Review of patient's allergies indicates:   Allergen Reactions    Pollen extracts     Lovastatin Rash     Not confirmed but pt skeptical     Objective:     Vital Signs (24h Range):  Temp:  [96.4 °F (35.8 °C)-98.5 °F (36.9 °C)] 98.5 °F (36.9 °C)  Pulse:  [70-95] 74  Resp:  [14-20] 14  SpO2:  [96 %-99 %] 98 %  BP: (119-135)/(67-74) 122/67       Lines/Drains/Airways     Drain                 Gastrostomy/Enterostomy 12/27/21 1152 Percutaneous endoscopic gastrostomy (PEG) LUQ 4 days          Airway                 Surgical Airway 12/27/21 1231 Shiley Cuffed 4 days          Peripheral Intravenous Line                 Peripheral IV - Single Lumen 12/26/21 0800 20 G Left;Posterior Forearm 6 days         Peripheral IV - Single Lumen 12/26/21 0937 20 G Anterior;Right Upper Arm 6 days                Physical Exam   NAD  6-0 DCS sutured in  place, cuff down, sutures removed   No peristomal blood, secretions white, no blood.   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    S/p DL/trach/PEG on 12/27. Awaiting definitive laryngectomy. Trach stoma placed superiorly through the tumor. Amenable to staying inpatient until planned laryngectomy. Elevated LFTs 12/30, now downtrending, pt asymptomatic.     - Keep NPO, mIVF   - G-tube instructions per GS  - Cont tube feeds   - Home meds restarted otherwise  - Keep cuffed trach in place leading up to surgery next week  - Please call or page ENT with any concerns    Hemoptysis          Samy Abbott MD  Otorhinolaryngology-Head & Neck Surgery  [facility]

## 2022-01-01 NOTE — ASSESSMENT & PLAN NOTE
BG goal 140-180    Increase Novolog to 3 units q 4 hrs while on tube feeds (HOLD if tube feeds are stopped or BG < 100)  Continue Low Dose Correction Scale  BG monitoring q 4 hrs while NPO/tube feeds    ** Please call Endocrine for any BG related issues **    Discharge plans: TBD

## 2022-01-01 NOTE — RESPIRATORY THERAPY
RAPID RESPONSE RESPIRATORY THERAPY PROACTIVE NOTE           Time of visit: 0850     Code Status: Full Code   : 1950  Bed: 1043/1043 A:   MRN: [id number]  Time spent at the bedside: < 15 min    SITUATION    Evaluated patient for: LDA Check     BACKGROUND    Patient has a past medical history of Allergy, Atrial fibrillation, Chronic anticoagulation, Diabetes mellitus, type 2, Hypertension, and Larynx neoplasm malignant.  Clinically Significant Surgical Hx: tracheostomy    24 Hours Vitals Range:  Temp:  [96.4 °F (35.8 °C)-98.5 °F (36.9 °C)]   Pulse:  [70-95]   Resp:  [14-20]   BP: (118-135)/(67-74)   SpO2:  [96 %-99 %]     Labs:    Recent Labs     21  0613 21  0406 22  0312   * 132* 131*   K 4.5 3.9 4.1   CL 97 100 100   CO2 27 23 26   CREATININE 0.9 0.8 0.8   * 236* 218*   PHOS 2.7 2.4* 2.8   MG 2.0 1.8 1.9        No results for input(s): PH, PCO2, PO2, HCO3, POCSATURATED, BE in the last 72 hours.    ASSESSMENT/INTERVENTIONS    Upon arrival in room pt resting no distress noted trachsupplies at bedside    Last VS   Temp: 98.2 °F (36.8 °C) (1100)  Pulse: 87 (1100)  Resp: 16 (1100)  BP: 118/71 (1100)  SpO2: 98 % (1100)    Level of Consciousness: Level of Consciousness (AVPU): alert  Respiratory Effort: Respiratory Effort: Unlabored Expansion/Accessory Muscle Usage: Expansion/Accessory Muscles/Retractions: expansion symmetric,no retractions,no use of accessory muscles  All Lung Field Breath Sounds: All Lung Fields Breath Sounds: Anterior:,Posterior:,Lateral:,crackles, coarse,diminished  EVER Breath Sounds: diminished  LLL Breath Sounds: diminished  RUL Breath Sounds: diminished  RML Breath Sounds: diminished  RLL Breath Sounds: diminished  O2 Device/Concentration: Flow (L/min): 5, Oxygen Concentration (%): 28, O2 Device (Oxygen Therapy): Trach Collar  Surgical airway: Yes, Type: Shiley Size: 6   Ambu at bedside: Ambu bag with the patient?: Yes, Adult Ambu      Active Orders   Respiratory Care    Oxygen Continuous     Frequency: Continuous     Number of Occurrences: Until Specified     Order Questions:      Device type: Low flow      Device: Trach Collar      Titrate O2 per Oxygen Titration Protocol: Yes      To maintain SpO2 goal of: >= 90%      Notify MD of: Inability to achieve desired SpO2; Sudden change in patient status and requires 20% increase in FiO2; Patient requires >60% FiO2    Pulse Oximetry Continuous     Frequency: Continuous     Number of Occurrences: Until Specified    Routine tracheostomy care     Frequency: BID     Number of Occurrences: Until Specified       RECOMMENDATIONS    We recommend: RRT Recs: Continue POC per primary team.    ESCALATION        FOLLOW-UP    Please call back the Rapid Response RT, Caro Perez, RRT at x [phone] for any questions or concerns.

## 2022-01-01 NOTE — PLAN OF CARE
POC reviewed. AAOx4, VSS on 5L O2 28% trach collar with no complaints of SOB, headaches, or dizziness. Able to cough and clear secretions through trach producing large amount of clear mucous. Urine output per urinal bedside, adequate. Tolerating being NPO. Tolerating continuous Impact Peptide 1.5 tube feedings, no BM  and no complaints of N/V. Patient denies pain. Resting with side rails up and call light with in reach. Blood glucose monitored q4h with scheduled insulin and insulin coverage per sliding scale given. Telemetry monitored NSR. Meds crushed and given per PEG tube. Continuing to monitor patient status.

## 2022-01-02 LAB
ALBUMIN SERPL BCP-MCNC: 2.5 G/DL (ref 3.5–5.2)
ALP SERPL-CCNC: 335 U/L (ref 55–135)
ALT SERPL W/O P-5'-P-CCNC: 113 U/L (ref 10–44)
ANION GAP SERPL CALC-SCNC: 10 MMOL/L (ref 8–16)
AST SERPL-CCNC: 27 U/L (ref 10–40)
BILIRUB SERPL-MCNC: 1 MG/DL (ref 0.1–1)
BUN SERPL-MCNC: 25 MG/DL (ref 8–23)
CALCIUM SERPL-MCNC: 10.5 MG/DL (ref 8.7–10.5)
CHLORIDE SERPL-SCNC: 101 MMOL/L (ref 95–110)
CO2 SERPL-SCNC: 23 MMOL/L (ref 23–29)
CREAT SERPL-MCNC: 0.8 MG/DL (ref 0.5–1.4)
ERYTHROCYTE [DISTWIDTH] IN BLOOD BY AUTOMATED COUNT: 13.4 % (ref 11.5–14.5)
ERYTHROCYTE [DISTWIDTH] IN BLOOD BY AUTOMATED COUNT: 13.4 % (ref 11.5–14.5)
EST. GFR  (AFRICAN AMERICAN): >60 ML/MIN/1.73 M^2
EST. GFR  (NON AFRICAN AMERICAN): >60 ML/MIN/1.73 M^2
GLUCOSE SERPL-MCNC: 191 MG/DL (ref 70–110)
HCT VFR BLD AUTO: 35 % (ref 40–54)
HCT VFR BLD AUTO: 36.4 % (ref 40–54)
HGB BLD-MCNC: 11.6 G/DL (ref 14–18)
HGB BLD-MCNC: 11.9 G/DL (ref 14–18)
MAGNESIUM SERPL-MCNC: 2 MG/DL (ref 1.6–2.6)
MCH RBC QN AUTO: 28.1 PG (ref 27–31)
MCH RBC QN AUTO: 28.3 PG (ref 27–31)
MCHC RBC AUTO-ENTMCNC: 32.7 G/DL (ref 32–36)
MCHC RBC AUTO-ENTMCNC: 33.1 G/DL (ref 32–36)
MCV RBC AUTO: 85 FL (ref 82–98)
MCV RBC AUTO: 86 FL (ref 82–98)
PHOSPHATE SERPL-MCNC: 3.1 MG/DL (ref 2.7–4.5)
PLATELET # BLD AUTO: 370 K/UL (ref 150–450)
PLATELET # BLD AUTO: 370 K/UL (ref 150–450)
PMV BLD AUTO: 10.1 FL (ref 9.2–12.9)
PMV BLD AUTO: 9.7 FL (ref 9.2–12.9)
POCT GLUCOSE: 172 MG/DL (ref 70–110)
POCT GLUCOSE: 177 MG/DL (ref 70–110)
POCT GLUCOSE: 214 MG/DL (ref 70–110)
POCT GLUCOSE: 217 MG/DL (ref 70–110)
POCT GLUCOSE: 254 MG/DL (ref 70–110)
POCT GLUCOSE: 275 MG/DL (ref 70–110)
POTASSIUM SERPL-SCNC: 4.2 MMOL/L (ref 3.5–5.1)
PROT SERPL-MCNC: 6.1 G/DL (ref 6–8.4)
RBC # BLD AUTO: 4.1 M/UL (ref 4.6–6.2)
RBC # BLD AUTO: 4.23 M/UL (ref 4.6–6.2)
SODIUM SERPL-SCNC: 134 MMOL/L (ref 136–145)
WBC # BLD AUTO: 8.83 K/UL (ref 3.9–12.7)
WBC # BLD AUTO: 9.46 K/UL (ref 3.9–12.7)

## 2022-01-02 PROCEDURE — 36410 VNPNXR 3YR/> PHY/QHP DX/THER: CPT | Mod: HCNC

## 2022-01-02 PROCEDURE — 94760 N-INVAS EAR/PLS OXIMETRY 1: CPT | Mod: HCNC

## 2022-01-02 PROCEDURE — 99900035 HC TECH TIME PER 15 MIN (STAT): Mod: HCNC

## 2022-01-02 PROCEDURE — 20600001 HC STEP DOWN PRIVATE ROOM: Mod: HCNC

## 2022-01-02 PROCEDURE — 80053 COMPREHEN METABOLIC PANEL: CPT | Mod: HCNC | Performed by: STUDENT IN AN ORGANIZED HEALTH CARE EDUCATION/TRAINING PROGRAM

## 2022-01-02 PROCEDURE — 85027 COMPLETE CBC AUTOMATED: CPT | Mod: 91,HCNC | Performed by: STUDENT IN AN ORGANIZED HEALTH CARE EDUCATION/TRAINING PROGRAM

## 2022-01-02 PROCEDURE — 94761 N-INVAS EAR/PLS OXIMETRY MLT: CPT | Mod: HCNC

## 2022-01-02 PROCEDURE — 99232 SBSQ HOSP IP/OBS MODERATE 35: CPT | Mod: HCNC,,, | Performed by: NURSE PRACTITIONER

## 2022-01-02 PROCEDURE — 99232 PR SUBSEQUENT HOSPITAL CARE,LEVL II: ICD-10-PCS | Mod: HCNC,,, | Performed by: NURSE PRACTITIONER

## 2022-01-02 PROCEDURE — 63600175 PHARM REV CODE 636 W HCPCS: Mod: HCNC

## 2022-01-02 PROCEDURE — 83735 ASSAY OF MAGNESIUM: CPT | Mod: HCNC | Performed by: STUDENT IN AN ORGANIZED HEALTH CARE EDUCATION/TRAINING PROGRAM

## 2022-01-02 PROCEDURE — 25000003 PHARM REV CODE 250: Mod: HCNC | Performed by: STUDENT IN AN ORGANIZED HEALTH CARE EDUCATION/TRAINING PROGRAM

## 2022-01-02 PROCEDURE — 84100 ASSAY OF PHOSPHORUS: CPT | Mod: HCNC | Performed by: STUDENT IN AN ORGANIZED HEALTH CARE EDUCATION/TRAINING PROGRAM

## 2022-01-02 PROCEDURE — 27000221 HC OXYGEN, UP TO 24 HOURS: Mod: HCNC

## 2022-01-02 PROCEDURE — 36415 COLL VENOUS BLD VENIPUNCTURE: CPT | Mod: HCNC | Performed by: STUDENT IN AN ORGANIZED HEALTH CARE EDUCATION/TRAINING PROGRAM

## 2022-01-02 RX ORDER — INSULIN ASPART 100 [IU]/ML
4 INJECTION, SOLUTION INTRAVENOUS; SUBCUTANEOUS
Status: DISCONTINUED | OUTPATIENT
Start: 2022-01-02 | End: 2022-01-02

## 2022-01-02 RX ORDER — INSULIN ASPART 100 [IU]/ML
4 INJECTION, SOLUTION INTRAVENOUS; SUBCUTANEOUS
Status: DISCONTINUED | OUTPATIENT
Start: 2022-01-03 | End: 2022-01-02

## 2022-01-02 RX ORDER — INSULIN ASPART 100 [IU]/ML
5 INJECTION, SOLUTION INTRAVENOUS; SUBCUTANEOUS
Status: DISCONTINUED | OUTPATIENT
Start: 2022-01-03 | End: 2022-01-06

## 2022-01-02 RX ORDER — AMOXICILLIN 250 MG
1 CAPSULE ORAL 2 TIMES DAILY
Status: DISCONTINUED | OUTPATIENT
Start: 2022-01-02 | End: 2022-01-07

## 2022-01-02 RX ORDER — INSULIN ASPART 100 [IU]/ML
5 INJECTION, SOLUTION INTRAVENOUS; SUBCUTANEOUS
Status: DISCONTINUED | OUTPATIENT
Start: 2022-01-02 | End: 2022-01-06

## 2022-01-02 RX ADMIN — DILTIAZEM HYDROCHLORIDE 30 MG: 30 TABLET, FILM COATED ORAL at 12:01

## 2022-01-02 RX ADMIN — INSULIN ASPART 3 UNITS: 100 INJECTION, SOLUTION INTRAVENOUS; SUBCUTANEOUS at 04:01

## 2022-01-02 RX ADMIN — INSULIN ASPART 3 UNITS: 100 INJECTION, SOLUTION INTRAVENOUS; SUBCUTANEOUS at 12:01

## 2022-01-02 RX ADMIN — FAMOTIDINE 20 MG: 20 TABLET ORAL at 08:01

## 2022-01-02 RX ADMIN — SENNOSIDES AND DOCUSATE SODIUM 1 TABLET: 50; 8.6 TABLET ORAL at 08:01

## 2022-01-02 RX ADMIN — INSULIN ASPART 3 UNITS: 100 INJECTION, SOLUTION INTRAVENOUS; SUBCUTANEOUS at 08:01

## 2022-01-02 RX ADMIN — ENOXAPARIN SODIUM 40 MG: 40 INJECTION SUBCUTANEOUS at 04:01

## 2022-01-02 RX ADMIN — DILTIAZEM HYDROCHLORIDE 30 MG: 30 TABLET, FILM COATED ORAL at 11:01

## 2022-01-02 RX ADMIN — INSULIN ASPART 4 UNITS: 100 INJECTION, SOLUTION INTRAVENOUS; SUBCUTANEOUS at 12:01

## 2022-01-02 RX ADMIN — DILTIAZEM HYDROCHLORIDE 30 MG: 30 TABLET, FILM COATED ORAL at 05:01

## 2022-01-02 RX ADMIN — INSULIN ASPART 1 UNITS: 100 INJECTION, SOLUTION INTRAVENOUS; SUBCUTANEOUS at 12:01

## 2022-01-02 RX ADMIN — INSULIN ASPART 4 UNITS: 100 INJECTION, SOLUTION INTRAVENOUS; SUBCUTANEOUS at 04:01

## 2022-01-02 RX ADMIN — INSULIN ASPART 5 UNITS: 100 INJECTION, SOLUTION INTRAVENOUS; SUBCUTANEOUS at 08:01

## 2022-01-02 RX ADMIN — SENNOSIDES AND DOCUSATE SODIUM 1 TABLET: 50; 8.6 TABLET ORAL at 11:01

## 2022-01-02 RX ADMIN — INSULIN ASPART 2 UNITS: 100 INJECTION, SOLUTION INTRAVENOUS; SUBCUTANEOUS at 04:01

## 2022-01-02 RX ADMIN — DILTIAZEM HYDROCHLORIDE 30 MG: 30 TABLET, FILM COATED ORAL at 06:01

## 2022-01-02 NOTE — SUBJECTIVE & OBJECTIVE
Interval History: NAEON. AFVSS. Doing well, reports has not had a BM.     Medications:  Continuous Infusions:   dextrose 10 % in water (D10W)       Scheduled Meds:   diltiaZEM  30 mg Per G Tube Q6H    enoxaparin  40 mg Subcutaneous Daily    famotidine  20 mg Per G Tube BID    [START ON 1/3/2022] insulin aspart U-100  4 Units Subcutaneous Q24H    [START ON 1/3/2022] insulin aspart U-100  4 Units Subcutaneous Q24H    [START ON 1/3/2022] insulin aspart U-100  4 Units Subcutaneous Q24H    insulin aspart U-100  4 Units Subcutaneous Q24H    insulin aspart U-100  4 Units Subcutaneous Q24H    insulin aspart U-100  4 Units Subcutaneous Q24H    senna-docusate 8.6-50 mg  1 tablet Per G Tube BID     PRN Meds:acetaminophen, dextrose 10 % in water (D10W), dextrose 50%, glucagon (human recombinant), insulin aspart U-100, oxyCODONE, sodium chloride 0.9%     Review of patient's allergies indicates:   Allergen Reactions    Pollen extracts     Lovastatin Rash     Not confirmed but pt skeptical     Objective:     Vital Signs (24h Range):  Temp:  [98.1 °F (36.7 °C)-98.7 °F (37.1 °C)] 98.5 °F (36.9 °C)  Pulse:  [67-87] 72  Resp:  [16-20] 16  SpO2:  [97 %-100 %] 97 %  BP: (118-135)/(69-82) 123/82     Date 01/02/22 0700 - 01/03/22 0659   Shift 2199-9657 3962-6941 3902-8515 24 Hour Total   INTAKE   P.O. 0   0   NG/   521   Shift Total(mL/kg) 521(7.9)   521(7.9)   OUTPUT   Urine(mL/kg/hr) 300   300   Shift Total(mL/kg) 300(4.5)   300(4.5)   Weight (kg) 66 66 66 66     Lines/Drains/Airways     Drain                 Gastrostomy/Enterostomy 12/27/21 1152 Percutaneous endoscopic gastrostomy (PEG) LUQ 5 days          Airway                 Surgical Airway 12/27/21 1231 Shiley Cuffed 5 days          Peripheral Intravenous Line                 Peripheral IV - Single Lumen 12/26/21 0800 20 G Left;Posterior Forearm 7 days         Peripheral IV - Single Lumen 12/26/21 0937 20 G Anterior;Right Upper Arm 7 days                Physical  Exam   NAD  6-0 DCS sutured in place, cuff down, neck tie secured  No peristomal blood, secretions white, no blood.   Able to phonate with finger occlusion, primarily communicates with lip reading  Post-radiation fibrosis of neck       Significant Labs:  BMP:   Recent Labs   Lab 01/02/22  0445   *      CO2 23   BUN 25*   CREATININE 0.8   CALCIUM 10.5   MG 2.0     CBC:   Recent Labs   Lab 01/02/22  0830   WBC 8.83   RBC 4.10*   HGB 11.6*   HCT 35.0*      MCV 85   MCH 28.3   MCHC 33.1       Significant Diagnostics:  None

## 2022-01-02 NOTE — PROGRESS NOTES
"[facility] - [facility]  Endocrinology  Progress Note    Admit Date: 12/25/2021     Reason for Consult: Management of T2DM, Hyperglycemia     Surgical Procedure and Date:   12/27/21  CREATION, TRACHEOSTOMY (N/A)  LARYNGOSCOPY, DIRECT, WITH BRONCHOSCOPY (N/A)  INSERTION, PEG TUBE (N/A)     Diabetes diagnosis year: 2010    Home Diabetes Medications:  Ozempic stopped on 10/18/21 by Dena Silveira as a1c below goal     How often checking glucose at home?  Once daily    BG readings on regimen: 150s  Hypoglycemia on the regimen?  No  Missed doses on regimen?  n/a    Diabetes Complications include:     None     Complicating diabetes co morbidities:   pAfib, HTN, active cancer       HPI:   Patient is a 71 y.o. 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 He presented as a transfer from [place] for ENT evaluation. He initially presented to the ER for worsening hemoptysis. Laryngeal videostroboscopy 11/8/21 notable for webbing across membranous vocal folds, granular changes infraglottically, firbinous debris across anterior commissure, post surgical stiffness of bilateral true vocal folds, phonatory supraglottic hyperfunction, occasional plica ventricularis, thick salivary secretions, pooled in pyriforms, diffuse mild laryngopharyngeal edema.He is now s/p the above procedure. He was last seen by MISSY Gutierrez on 10/18/21 for DM management. Endocrinology consulted for management of T2DM.            Lab Results   Component Value Date    HGBA1C 5.5 10/05/2021           Interval HPI:   Overnight events: Remains in [facility]. POD 6. BG slightly above goal ranges on current SQ insulin regimen. Tube feeds at goal. Diet NPO  Eating:   NPO  Nausea: No  Hypoglycemia and intervention: No  Fever: No  TPN and/or TF: Yes  If yes, type of TF/TPN and rate: Impact Peptide 1.5 at 45 ml/hr     /82   Pulse 72   Temp 98.5 °F (36.9 °C)   Resp 16   Ht 5' 6" " (1.676 m)   Wt 66 kg (145 lb 8.1 oz)   SpO2 98%   BMI 23.48 kg/m²     Labs Reviewed and Include    Recent Labs   Lab 01/02/22  0445   *   CALCIUM 10.5   ALBUMIN 2.5*   PROT 6.1   *   K 4.2   CO2 23      BUN 25*   CREATININE 0.8   ALKPHOS 335*   *   AST 27   BILITOT 1.0     Lab Results   Component Value Date    WBC 8.52 01/01/2022    HGB 11.5 (L) 01/01/2022    HCT 34.6 (L) 01/01/2022    MCV 87 01/01/2022     01/01/2022     No results for input(s): TSH, FREET4 in the last 168 hours.  Lab Results   Component Value Date    HGBA1C 5.5 10/05/2021       Nutritional status:   Body mass index is 23.48 kg/m².  Lab Results   Component Value Date    ALBUMIN 2.5 (L) 01/02/2022    ALBUMIN 2.2 (L) 01/01/2022    ALBUMIN 2.2 (L) 12/31/2021     No results found for: PREALBUMIN    Estimated Creatinine Clearance: 77.5 mL/min (based on SCr of 0.8 mg/dL).    Accu-Checks  Recent Labs     12/31/21  1719 12/31/21  2151 01/01/22  0048 01/01/22  0516 01/01/22  1013 01/01/22  1403 01/01/22  1728 01/01/22  2007 01/02/22  0032 01/02/22  0428   POCTGLUCOSE 218* 235* 179* 254* 174* 254* 210* 203* 217* 214*       Current Medications and/or Treatments Impacting Glycemic Control  Immunotherapy:    Immunosuppressants     None        Steroids:   Hormones (From admission, onward)            None        Pressors:    Autonomic Drugs (From admission, onward)            None        Hyperglycemia/Diabetes Medications:   Antihyperglycemics (From admission, onward)            Start     Stop Route Frequency Ordered    01/02/22 0400  insulin aspart U-100 pen 3 Units         -- SubQ Every 24 hours (non-standard times) 01/01/22 0734    01/02/22 0000  insulin aspart U-100 pen 3 Units         -- SubQ Every 24 hours (non-standard times) 01/01/22 0734 01/01/22 2000  insulin aspart U-100 pen 3 Units         -- SubQ Every 24 hours (non-standard times) 01/01/22 0734    01/01/22 1600  insulin aspart U-100 pen 3 Units         -- SubQ  Every 24 hours (non-standard times) 01/01/22 0734    01/01/22 1200  insulin aspart U-100 pen 3 Units         -- SubQ Every 24 hours (non-standard times) 01/01/22 0734    01/01/22 0800  insulin aspart U-100 pen 3 Units         -- SubQ Every 24 hours (non-standard times) 01/01/22 0734    12/31/21 1133  insulin aspart U-100 pen 0-5 Units         -- SubQ Every 4 hours PRN 12/31/21 1033          ASSESSMENT and PLAN    * Larynx neoplasm malignant  Managed per primary team  Optimize BG control        Type 2 diabetes mellitus with hyperglycemia, without long-term current use of insulin  BG goal 140-180    Increase Novolog to 4 units q 4 hrs while on tube feeds (HOLD if tube feeds are stopped or BG < 100)  Continue Low Dose Correction Scale  BG monitoring q 4 hrs while NPO/tube feeds    ** Please call Endocrine for any BG related issues **    Discharge plans: TBD    On enteral nutrition  Enteral nutrition may increase blood glucose.  Optimize BG control          Destini Guy NP  Endocrinology  [facility]

## 2022-01-02 NOTE — RESPIRATORY THERAPY
Pt does not tolerate passy-blaze valve. RT attempted to place pt on valve due to copious amounts of secretions and pt could not breathe.

## 2022-01-02 NOTE — PLAN OF CARE
Pt aao x3. Vss. No acute distress. Peptide infusing to PEG at goal rate of 40cc/hr. Pt's  PIV removed and a right upper arm midline placed. Pt is an accucheck every 4 hours with novolog to give. Pt steady on his feet. Wife at bedside. Pt has a shiley trach. O2 going to trach 5L @ 28%. Tele reveals NSR. Pt hasn't had a BP since surgery. Pt started on a bowel regimen today. Pt has zero breakdown. See assessment for full chart details. Will continue to monitor, assess, and adjust care as needed.

## 2022-01-02 NOTE — PROGRESS NOTES
[facility]  Otorhinolaryngology-Head & Neck Surgery  Progress Note    Subjective:     Post-Op Info:  Procedure(s) (LRB):  CREATION, TRACHEOSTOMY (N/A)  LARYNGOSCOPY, DIRECT, WITH BRONCHOSCOPY (N/A)  INSERTION, PEG TUBE (N/A)   6 Days Post-Op  Hospital Day: 9     Interval History: NAEON. AFVSS. Doing well, reports has not had a BM.     Medications:  Continuous Infusions:   dextrose 10 % in water (D10W)       Scheduled Meds:   diltiaZEM  30 mg Per G Tube Q6H    enoxaparin  40 mg Subcutaneous Daily    famotidine  20 mg Per G Tube BID    [START ON 1/3/2022] insulin aspart U-100  4 Units Subcutaneous Q24H    [START ON 1/3/2022] insulin aspart U-100  4 Units Subcutaneous Q24H    [START ON 1/3/2022] insulin aspart U-100  4 Units Subcutaneous Q24H    insulin aspart U-100  4 Units Subcutaneous Q24H    insulin aspart U-100  4 Units Subcutaneous Q24H    insulin aspart U-100  4 Units Subcutaneous Q24H    senna-docusate 8.6-50 mg  1 tablet Per G Tube BID     PRN Meds:acetaminophen, dextrose 10 % in water (D10W), dextrose 50%, glucagon (human recombinant), insulin aspart U-100, oxyCODONE, sodium chloride 0.9%     Review of patient's allergies indicates:   Allergen Reactions    Pollen extracts     Lovastatin Rash     Not confirmed but pt skeptical     Objective:     Vital Signs (24h Range):  Temp:  [98.1 °F (36.7 °C)-98.7 °F (37.1 °C)] 98.5 °F (36.9 °C)  Pulse:  [67-87] 72  Resp:  [16-20] 16  SpO2:  [97 %-100 %] 97 %  BP: (118-135)/(69-82) 123/82     Date 01/02/22 0700 - 01/03/22 0659   Shift 3491-5248 1125-6833 8225-1777 24 Hour Total   INTAKE   P.O. 0   0   NG/   521   Shift Total(mL/kg) 521(7.9)   521(7.9)   OUTPUT   Urine(mL/kg/hr) 300   300   Shift Total(mL/kg) 300(4.5)   300(4.5)   Weight (kg) 66 66 66 66     Lines/Drains/Airways     Drain                 Gastrostomy/Enterostomy 12/27/21 1152 Percutaneous endoscopic gastrostomy (PEG) LUQ 5 days          Airway                 Surgical Airway 12/27/21  1231 [place] Cuffed 5 days          Peripheral Intravenous Line                 Peripheral IV - Single Lumen 12/26/21 0800 20 G Left;Posterior Forearm 7 days         Peripheral IV - Single Lumen 12/26/21 0937 20 G Anterior;Right Upper Arm 7 days                Physical Exam   NAD  6-0 DCS sutured in place, cuff down, neck tie secured  No peristomal blood, secretions white, no blood.   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    S/p DL/trach/PEG on 12/27. Awaiting definitive laryngectomy. Trach stoma placed superiorly through the tumor. Amenable to staying inpatient until planned laryngectomy.    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     Discussed in detail he and his wife his situation and the options prior to his laryngectomy which include intubation, tracheostomy creation or conservative measures. He is unlikely to be able to tolerate conservative measures alone due to frequent stimulation of his larynx from the bleeding. After a long discussion, they agreed to proceed with placement of a tracheostomy tube prior to his definitive laryngectomy.     - Will plan for trach/g-tube this afternoon with Dr Espinosa  and Gen surgery  - consent obtained this AM  - Keep NPO, mIVF  - ICU for airway observation  - Nebulized TXA q4h prn  - Continue to hold home eliquis for now, ok with subq heparin  - Home meds restarted otherwise  - Intubatable with a a 6-0 ETT should bleeding begin to compromise his airway  - q12h H/H, Daily labs  - Continue ICU care  - Please call or page ENT with any concerns        Samy Abbott MD  Otorhinolaryngology-Head & Neck Surgery  [facility]

## 2022-01-02 NOTE — ASSESSMENT & PLAN NOTE
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    S/p DL/trach/PEG on 12/27. Awaiting definitive laryngectomy. Trach stoma placed superiorly through the tumor. Amenable to staying inpatient until planned laryngectomy.    - Keep NPO, mIVF   - Cont tube feeds   - Home meds restarted otherwise  - Keep cuffed trach in place leading up to surgery next week  - Bowel regimen  - Please call or page ENT with any concerns

## 2022-01-02 NOTE — SUBJECTIVE & OBJECTIVE
"Interval HPI:   Overnight events: Remains in [facility]. POD 6. BG slightly above goal ranges on current SQ insulin regimen. Tube feeds at goal. Diet NPO  Eating:   NPO  Nausea: No  Hypoglycemia and intervention: No  Fever: No  TPN and/or TF: Yes  If yes, type of TF/TPN and rate: Impact Peptide 1.5 at 45 ml/hr     /82   Pulse 72   Temp 98.5 °F (36.9 °C)   Resp 16   Ht 5' 6" (1.676 m)   Wt 66 kg (145 lb 8.1 oz)   SpO2 98%   BMI 23.48 kg/m²     Labs Reviewed and Include    Recent Labs   Lab 01/02/22  0445   *   CALCIUM 10.5   ALBUMIN 2.5*   PROT 6.1   *   K 4.2   CO2 23      BUN 25*   CREATININE 0.8   ALKPHOS 335*   *   AST 27   BILITOT 1.0     Lab Results   Component Value Date    WBC 8.52 01/01/2022    HGB 11.5 (L) 01/01/2022    HCT 34.6 (L) 01/01/2022    MCV 87 01/01/2022     01/01/2022     No results for input(s): TSH, FREET4 in the last 168 hours.  Lab Results   Component Value Date    HGBA1C 5.5 10/05/2021       Nutritional status:   Body mass index is 23.48 kg/m².  Lab Results   Component Value Date    ALBUMIN 2.5 (L) 01/02/2022    ALBUMIN 2.2 (L) 01/01/2022    ALBUMIN 2.2 (L) 12/31/2021     No results found for: PREALBUMIN    Estimated Creatinine Clearance: 77.5 mL/min (based on SCr of 0.8 mg/dL).    Accu-Checks  Recent Labs     12/31/21  1719 12/31/21  2151 01/01/22  0048 01/01/22  0516 01/01/22  1013 01/01/22  1403 01/01/22  1728 01/01/22  2007 01/02/22  0032 01/02/22  0428   POCTGLUCOSE 218* 235* 179* 254* 174* 254* 210* 203* 217* 214*       Current Medications and/or Treatments Impacting Glycemic Control  Immunotherapy:    Immunosuppressants     None        Steroids:   Hormones (From admission, onward)            None        Pressors:    Autonomic Drugs (From admission, onward)            None        Hyperglycemia/Diabetes Medications:   Antihyperglycemics (From admission, onward)            Start     Stop Route Frequency Ordered    01/02/22 0400  insulin aspart " U-100 pen 3 Units         -- SubQ Every 24 hours (non-standard times) 01/01/22 0734    01/02/22 0000  insulin aspart U-100 pen 3 Units         -- SubQ Every 24 hours (non-standard times) 01/01/22 0734    01/01/22 2000  insulin aspart U-100 pen 3 Units         -- SubQ Every 24 hours (non-standard times) 01/01/22 0734    01/01/22 1600  insulin aspart U-100 pen 3 Units         -- SubQ Every 24 hours (non-standard times) 01/01/22 0734    01/01/22 1200  insulin aspart U-100 pen 3 Units         -- SubQ Every 24 hours (non-standard times) 01/01/22 0734    01/01/22 0800  insulin aspart U-100 pen 3 Units         -- SubQ Every 24 hours (non-standard times) 01/01/22 0734    12/31/21 1133  insulin aspart U-100 pen 0-5 Units         -- SubQ Every 4 hours PRN 12/31/21 1033

## 2022-01-02 NOTE — ASSESSMENT & PLAN NOTE
BG goal 140-180    Increase Novolog to 4 units q 4 hrs while on tube feeds (HOLD if tube feeds are stopped or BG < 100)  Continue Low Dose Correction Scale  BG monitoring q 4 hrs while NPO/tube feeds    ** Please call Endocrine for any BG related issues **    Discharge plans: TBD

## 2022-01-03 ENCOUNTER — ANESTHESIA EVENT (OUTPATIENT)
Dept: SURGERY | Facility: HOSPITAL | Age: 72
DRG: 012 | End: 2022-01-03
Payer: MEDICARE

## 2022-01-03 LAB
ALBUMIN SERPL BCP-MCNC: 2.5 G/DL (ref 3.5–5.2)
ALBUMIN SERPL BCP-MCNC: 2.5 G/DL (ref 3.5–5.2)
ALP SERPL-CCNC: 289 U/L (ref 55–135)
ALP SERPL-CCNC: 289 U/L (ref 55–135)
ALT SERPL W/O P-5'-P-CCNC: 77 U/L (ref 10–44)
ALT SERPL W/O P-5'-P-CCNC: 77 U/L (ref 10–44)
ANION GAP SERPL CALC-SCNC: 6 MMOL/L (ref 8–16)
ANION GAP SERPL CALC-SCNC: 6 MMOL/L (ref 8–16)
AST SERPL-CCNC: 21 U/L (ref 10–40)
AST SERPL-CCNC: 21 U/L (ref 10–40)
BILIRUB SERPL-MCNC: 1 MG/DL (ref 0.1–1)
BILIRUB SERPL-MCNC: 1 MG/DL (ref 0.1–1)
BUN SERPL-MCNC: 29 MG/DL (ref 8–23)
BUN SERPL-MCNC: 29 MG/DL (ref 8–23)
CALCIUM SERPL-MCNC: 10 MG/DL (ref 8.7–10.5)
CALCIUM SERPL-MCNC: 10 MG/DL (ref 8.7–10.5)
CHLORIDE SERPL-SCNC: 99 MMOL/L (ref 95–110)
CHLORIDE SERPL-SCNC: 99 MMOL/L (ref 95–110)
CO2 SERPL-SCNC: 27 MMOL/L (ref 23–29)
CO2 SERPL-SCNC: 27 MMOL/L (ref 23–29)
CREAT SERPL-MCNC: 0.8 MG/DL (ref 0.5–1.4)
CREAT SERPL-MCNC: 0.8 MG/DL (ref 0.5–1.4)
EST. GFR  (AFRICAN AMERICAN): >60 ML/MIN/1.73 M^2
EST. GFR  (AFRICAN AMERICAN): >60 ML/MIN/1.73 M^2
EST. GFR  (NON AFRICAN AMERICAN): >60 ML/MIN/1.73 M^2
EST. GFR  (NON AFRICAN AMERICAN): >60 ML/MIN/1.73 M^2
GLUCOSE SERPL-MCNC: 204 MG/DL (ref 70–110)
GLUCOSE SERPL-MCNC: 204 MG/DL (ref 70–110)
MAGNESIUM SERPL-MCNC: 1.9 MG/DL (ref 1.6–2.6)
MAGNESIUM SERPL-MCNC: 1.9 MG/DL (ref 1.6–2.6)
PHOSPHATE SERPL-MCNC: 3.4 MG/DL (ref 2.7–4.5)
PHOSPHATE SERPL-MCNC: 3.4 MG/DL (ref 2.7–4.5)
POCT GLUCOSE: 171 MG/DL (ref 70–110)
POCT GLUCOSE: 188 MG/DL (ref 70–110)
POCT GLUCOSE: 208 MG/DL (ref 70–110)
POCT GLUCOSE: 219 MG/DL (ref 70–110)
POCT GLUCOSE: 288 MG/DL (ref 70–110)
POTASSIUM SERPL-SCNC: 4.6 MMOL/L (ref 3.5–5.1)
POTASSIUM SERPL-SCNC: 4.6 MMOL/L (ref 3.5–5.1)
PROT SERPL-MCNC: 5.4 G/DL (ref 6–8.4)
PROT SERPL-MCNC: 5.4 G/DL (ref 6–8.4)
SODIUM SERPL-SCNC: 132 MMOL/L (ref 136–145)
SODIUM SERPL-SCNC: 132 MMOL/L (ref 136–145)

## 2022-01-03 PROCEDURE — 87070 CULTURE OTHR SPECIMN AEROBIC: CPT | Mod: HCNC | Performed by: OTOLARYNGOLOGY

## 2022-01-03 PROCEDURE — 80053 COMPREHEN METABOLIC PANEL: CPT | Mod: HCNC | Performed by: STUDENT IN AN ORGANIZED HEALTH CARE EDUCATION/TRAINING PROGRAM

## 2022-01-03 PROCEDURE — 99900026 HC AIRWAY MAINTENANCE (STAT): Mod: HCNC

## 2022-01-03 PROCEDURE — C9399 UNCLASSIFIED DRUGS OR BIOLOG: HCPCS | Mod: HCNC | Performed by: NURSE PRACTITIONER

## 2022-01-03 PROCEDURE — 27200966 HC CLOSED SUCTION SYSTEM: Mod: HCNC

## 2022-01-03 PROCEDURE — 83735 ASSAY OF MAGNESIUM: CPT | Mod: HCNC | Performed by: STUDENT IN AN ORGANIZED HEALTH CARE EDUCATION/TRAINING PROGRAM

## 2022-01-03 PROCEDURE — 20600001 HC STEP DOWN PRIVATE ROOM: Mod: HCNC

## 2022-01-03 PROCEDURE — 25000003 PHARM REV CODE 250: Mod: HCNC | Performed by: STUDENT IN AN ORGANIZED HEALTH CARE EDUCATION/TRAINING PROGRAM

## 2022-01-03 PROCEDURE — 63600175 PHARM REV CODE 636 W HCPCS: Mod: HCNC

## 2022-01-03 PROCEDURE — 97130 THER IVNTJ EA ADDL 15 MIN: CPT | Mod: HCNC

## 2022-01-03 PROCEDURE — 27000221 HC OXYGEN, UP TO 24 HOURS: Mod: HCNC

## 2022-01-03 PROCEDURE — 99900022: Mod: HCNC

## 2022-01-03 PROCEDURE — 84100 ASSAY OF PHOSPHORUS: CPT | Mod: HCNC | Performed by: STUDENT IN AN ORGANIZED HEALTH CARE EDUCATION/TRAINING PROGRAM

## 2022-01-03 PROCEDURE — 99232 PR SUBSEQUENT HOSPITAL CARE,LEVL II: ICD-10-PCS | Mod: HCNC,,, | Performed by: NURSE PRACTITIONER

## 2022-01-03 PROCEDURE — 25000003 PHARM REV CODE 250: Mod: HCNC | Performed by: NURSE PRACTITIONER

## 2022-01-03 PROCEDURE — 94761 N-INVAS EAR/PLS OXIMETRY MLT: CPT | Mod: HCNC

## 2022-01-03 PROCEDURE — 99900035 HC TECH TIME PER 15 MIN (STAT): Mod: HCNC

## 2022-01-03 PROCEDURE — 99232 SBSQ HOSP IP/OBS MODERATE 35: CPT | Mod: HCNC,,, | Performed by: NURSE PRACTITIONER

## 2022-01-03 PROCEDURE — 87205 SMEAR GRAM STAIN: CPT | Mod: HCNC | Performed by: OTOLARYNGOLOGY

## 2022-01-03 RX ADMIN — DILTIAZEM HYDROCHLORIDE 30 MG: 30 TABLET, FILM COATED ORAL at 05:01

## 2022-01-03 RX ADMIN — FAMOTIDINE 20 MG: 20 TABLET ORAL at 10:01

## 2022-01-03 RX ADMIN — INSULIN ASPART 5 UNITS: 100 INJECTION, SOLUTION INTRAVENOUS; SUBCUTANEOUS at 08:01

## 2022-01-03 RX ADMIN — INSULIN ASPART 5 UNITS: 100 INJECTION, SOLUTION INTRAVENOUS; SUBCUTANEOUS at 10:01

## 2022-01-03 RX ADMIN — DILTIAZEM HYDROCHLORIDE 30 MG: 30 TABLET, FILM COATED ORAL at 01:01

## 2022-01-03 RX ADMIN — INSULIN ASPART 3 UNITS: 100 INJECTION, SOLUTION INTRAVENOUS; SUBCUTANEOUS at 04:01

## 2022-01-03 RX ADMIN — ENOXAPARIN SODIUM 40 MG: 40 INJECTION SUBCUTANEOUS at 04:01

## 2022-01-03 RX ADMIN — INSULIN ASPART 2 UNITS: 100 INJECTION, SOLUTION INTRAVENOUS; SUBCUTANEOUS at 05:01

## 2022-01-03 RX ADMIN — INSULIN ASPART 5 UNITS: 100 INJECTION, SOLUTION INTRAVENOUS; SUBCUTANEOUS at 05:01

## 2022-01-03 RX ADMIN — DILTIAZEM HYDROCHLORIDE 30 MG: 30 TABLET, FILM COATED ORAL at 06:01

## 2022-01-03 RX ADMIN — INSULIN DETEMIR 10 UNITS: 100 INJECTION, SOLUTION SUBCUTANEOUS at 04:01

## 2022-01-03 RX ADMIN — SENNOSIDES AND DOCUSATE SODIUM 1 TABLET: 50; 8.6 TABLET ORAL at 10:01

## 2022-01-03 RX ADMIN — SENNOSIDES AND DOCUSATE SODIUM 1 TABLET: 50; 8.6 TABLET ORAL at 08:01

## 2022-01-03 RX ADMIN — FAMOTIDINE 20 MG: 20 TABLET ORAL at 08:01

## 2022-01-03 RX ADMIN — DILTIAZEM HYDROCHLORIDE 30 MG: 30 TABLET, FILM COATED ORAL at 12:01

## 2022-01-03 RX ADMIN — INSULIN ASPART 5 UNITS: 100 INJECTION, SOLUTION INTRAVENOUS; SUBCUTANEOUS at 04:01

## 2022-01-03 RX ADMIN — INSULIN ASPART 5 UNITS: 100 INJECTION, SOLUTION INTRAVENOUS; SUBCUTANEOUS at 12:01

## 2022-01-03 NOTE — ASSESSMENT & PLAN NOTE
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    S/p DL/trach/PEG on 12/27. Awaiting definitive laryngectomy. Trach stoma placed superiorly through the tumor. Amenable to staying inpatient until planned laryngectomy.    - Keep NPO, mIVF   - Cont tube feeds   - Home meds restarted otherwise  - Keep cuffed trach in place leading up to surgery next week  - Bowel regimen  - Please call or page ENT with any concerns

## 2022-01-03 NOTE — PROGRESS NOTES
[facility]ael [facility] - [facility]  Endocrinology  Progress Note    Admit Date: 12/25/2021     Reason for Consult: Management of T2DM, Hyperglycemia     Surgical Procedure and Date:   12/27/21  CREATION, TRACHEOSTOMY (N/A)  LARYNGOSCOPY, DIRECT, WITH BRONCHOSCOPY (N/A)  INSERTION, PEG TUBE (N/A)     Diabetes diagnosis year: 2010    Home Diabetes Medications:  Ozempic stopped on 10/18/21 by Dena Silveira as a1c below goal     How often checking glucose at home?  Once daily    BG readings on regimen: 150s  Hypoglycemia on the regimen?  No  Missed doses on regimen?  n/a    Diabetes Complications include:     None     Complicating diabetes co morbidities:   pAfib, HTN, active cancer       HPI:   Patient is a 71 y.o. 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 He presented as a transfer from [place] for ENT evaluation. He initially presented to the ER for worsening hemoptysis. Laryngeal videostroboscopy 11/8/21 notable for webbing across membranous vocal folds, granular changes infraglottically, firbinous debris across anterior commissure, post surgical stiffness of bilateral true vocal folds, phonatory supraglottic hyperfunction, occasional plica ventricularis, thick salivary secretions, pooled in pyriforms, diffuse mild laryngopharyngeal edema.He is now s/p the above procedure. He was last seen by MISSY Gutierrez on 10/18/21 for DM management. Endocrinology consulted for management of T2DM.            Lab Results   Component Value Date    HGBA1C 5.5 10/05/2021           Interval HPI:   Overnight events: Remains in [facility]. POD 7. BG slightly above goal ranges on current SQ insulin regimen. Remains on Tube feeds at goal rate.   Eating:   NPO  Nausea: No  Hypoglycemia and intervention: No  Fever: No  TPN and/or TF: Yes  If yes, type of TF/TPN and rate: Impact Peptide 1.5 at 45 ml/hr    /76   Pulse 80   Temp 98.3 °F (36.8 °C)   Resp 15   Ht  "5' 6" (1.676 m)   Wt 66 kg (145 lb 8.1 oz)   SpO2 98%   BMI 23.48 kg/m²     Labs Reviewed and Include    Recent Labs   Lab 01/03/22  0532   *  204*   CALCIUM 10.0  10.0   ALBUMIN 2.5*  2.5*   PROT 5.4*  5.4*   *  132*   K 4.6  4.6   CO2 27  27   CL 99  99   BUN 29*  29*   CREATININE 0.8  0.8   ALKPHOS 289*  289*   ALT 77*  77*   AST 21  21   BILITOT 1.0  1.0     Lab Results   Component Value Date    WBC 9.46 01/02/2022    HGB 11.9 (L) 01/02/2022    HCT 36.4 (L) 01/02/2022    MCV 86 01/02/2022     01/02/2022     No results for input(s): TSH, FREET4 in the last 168 hours.  Lab Results   Component Value Date    HGBA1C 5.5 10/05/2021       Nutritional status:   Body mass index is 23.48 kg/m².  Lab Results   Component Value Date    ALBUMIN 2.5 (L) 01/03/2022    ALBUMIN 2.5 (L) 01/03/2022    ALBUMIN 2.5 (L) 01/02/2022     No results found for: PREALBUMIN    Estimated Creatinine Clearance: 77.5 mL/min (based on SCr of 0.8 mg/dL).    Accu-Checks  Recent Labs     01/01/22 2007 01/02/22  0032 01/02/22  0428 01/02/22  0846 01/02/22  1221 01/02/22  1637 01/02/22 2018 01/03/22  0031 01/03/22  0512 01/03/22  1241   POCTGLUCOSE 203* 217* 214* 275* 172* 254* 177* 208* 219* 188*       Current Medications and/or Treatments Impacting Glycemic Control  Immunotherapy:    Immunosuppressants     None        Steroids:   Hormones (From admission, onward)            None        Pressors:    Autonomic Drugs (From admission, onward)            None        Hyperglycemia/Diabetes Medications:   Antihyperglycemics (From admission, onward)            Start     Stop Route Frequency Ordered    01/03/22 1600  insulin aspart U-100 pen 5 Units         -- SubQ Every 24 hours (non-standard times) 01/02/22 1848    01/03/22 1300  insulin detemir U-100 pen 10 Units         -- SubQ Daily 01/03/22 1246    01/03/22 1200  insulin aspart U-100 pen 5 Units         -- SubQ Every 24 hours (non-standard times) 01/02/22 1848    " 01/03/22 0800  insulin aspart U-100 pen 5 Units         -- SubQ Every 24 hours (non-standard times) 01/02/22 1848    01/03/22 0400  insulin aspart U-100 pen 5 Units         -- SubQ Every 24 hours (non-standard times) 01/02/22 1848    01/03/22 0000  insulin aspart U-100 pen 5 Units         -- SubQ Every 24 hours (non-standard times) 01/02/22 1848    01/02/22 2000  insulin aspart U-100 pen 5 Units         -- SubQ Every 24 hours (non-standard times) 01/02/22 1848 12/31/21 1133  insulin aspart U-100 pen 0-5 Units         -- SubQ Every 4 hours PRN 12/31/21 1033          ASSESSMENT and PLAN    * Larynx neoplasm malignant  Managed per primary team  Optimize BG control        Type 2 diabetes mellitus with hyperglycemia, without long-term current use of insulin  BG goal 140-180    Start Levemir 10 units daily (0.3 u/kg dosing)   Continue Novolog 5 units q 4 hrs while on tube feeds (HOLD if tube feeds are stopped or BG < 100)  Continue Low Dose Correction Scale  BG monitoring q 4 hrs while NPO/tube feeds    ** Please call Endocrine for any BG related issues **    Discharge plans: TBD    On enteral nutrition  Enteral nutrition may increase blood glucose.  Optimize BG control          Destini Guy, NP  Endocrinology  [facility]

## 2022-01-03 NOTE — ASSESSMENT & PLAN NOTE
BG goal 140-180    Start Levemir 10 units daily (0.3 u/kg dosing)   Continue Novolog 5 units q 4 hrs while on tube feeds (HOLD if tube feeds are stopped or BG < 100)  Continue Low Dose Correction Scale  BG monitoring q 4 hrs while NPO/tube feeds    ** Please call Endocrine for any BG related issues **    Discharge plans: TBD

## 2022-01-03 NOTE — SUBJECTIVE & OBJECTIVE
"Interval HPI:   Overnight events: Remains in [facility]. POD 7. BG slightly above goal ranges on current SQ insulin regimen. Remains on Tube feeds at goal rate.   Eating:   NPO  Nausea: No  Hypoglycemia and intervention: No  Fever: No  TPN and/or TF: Yes  If yes, type of TF/TPN and rate: Impact Peptide 1.5 at 45 ml/hr    /76   Pulse 80   Temp 98.3 °F (36.8 °C)   Resp 15   Ht 5' 6" (1.676 m)   Wt 66 kg (145 lb 8.1 oz)   SpO2 98%   BMI 23.48 kg/m²     Labs Reviewed and Include    Recent Labs   Lab 01/03/22  0532   *  204*   CALCIUM 10.0  10.0   ALBUMIN 2.5*  2.5*   PROT 5.4*  5.4*   *  132*   K 4.6  4.6   CO2 27  27   CL 99  99   BUN 29*  29*   CREATININE 0.8  0.8   ALKPHOS 289*  289*   ALT 77*  77*   AST 21  21   BILITOT 1.0  1.0     Lab Results   Component Value Date    WBC 9.46 01/02/2022    HGB 11.9 (L) 01/02/2022    HCT 36.4 (L) 01/02/2022    MCV 86 01/02/2022     01/02/2022     No results for input(s): TSH, FREET4 in the last 168 hours.  Lab Results   Component Value Date    HGBA1C 5.5 10/05/2021       Nutritional status:   Body mass index is 23.48 kg/m².  Lab Results   Component Value Date    ALBUMIN 2.5 (L) 01/03/2022    ALBUMIN 2.5 (L) 01/03/2022    ALBUMIN 2.5 (L) 01/02/2022     No results found for: PREALBUMIN    Estimated Creatinine Clearance: 77.5 mL/min (based on SCr of 0.8 mg/dL).    Accu-Checks  Recent Labs     01/01/22 2007 01/02/22  0032 01/02/22  0428 01/02/22  0846 01/02/22  1221 01/02/22  1637 01/02/22 2018 01/03/22  0031 01/03/22  0512 01/03/22  1241   POCTGLUCOSE 203* 217* 214* 275* 172* 254* 177* 208* 219* 188*       Current Medications and/or Treatments Impacting Glycemic Control  Immunotherapy:    Immunosuppressants     None        Steroids:   Hormones (From admission, onward)            None        Pressors:    Autonomic Drugs (From admission, onward)            None        Hyperglycemia/Diabetes Medications:   Antihyperglycemics (From admission, " onward)            Start     Stop Route Frequency Ordered    01/03/22 1600  insulin aspart U-100 pen 5 Units         -- SubQ Every 24 hours (non-standard times) 01/02/22 1848    01/03/22 1300  insulin detemir U-100 pen 10 Units         -- SubQ Daily 01/03/22 1246    01/03/22 1200  insulin aspart U-100 pen 5 Units         -- SubQ Every 24 hours (non-standard times) 01/02/22 1848 01/03/22 0800  insulin aspart U-100 pen 5 Units         -- SubQ Every 24 hours (non-standard times) 01/02/22 1848    01/03/22 0400  insulin aspart U-100 pen 5 Units         -- SubQ Every 24 hours (non-standard times) 01/02/22 1848    01/03/22 0000  insulin aspart U-100 pen 5 Units         -- SubQ Every 24 hours (non-standard times) 01/02/22 1848    01/02/22 2000  insulin aspart U-100 pen 5 Units         -- SubQ Every 24 hours (non-standard times) 01/02/22 1848    12/31/21 1133  insulin aspart U-100 pen 0-5 Units         -- SubQ Every 4 hours PRN 12/31/21 1033

## 2022-01-03 NOTE — PROGRESS NOTES
Nael Carey - [facility]  Otorhinolaryngology-Head & Neck Surgery  Progress Note    Subjective:     Post-Op Info:  Procedure(s) (LRB):  CREATION, TRACHEOSTOMY (N/A)  LARYNGOSCOPY, DIRECT, WITH BRONCHOSCOPY (N/A)  INSERTION, PEG TUBE (N/A)   7 Days Post-Op  Hospital Day: 10     Interval History: No acute events over night.    Medications:  Continuous Infusions:   dextrose 10 % in water (D10W)       Scheduled Meds:   diltiaZEM  30 mg Per G Tube Q6H    enoxaparin  40 mg Subcutaneous Daily    famotidine  20 mg Per G Tube BID    insulin aspart U-100  5 Units Subcutaneous Q24H    insulin aspart U-100  5 Units Subcutaneous Q24H    insulin aspart U-100  5 Units Subcutaneous Q24H    insulin aspart U-100  5 Units Subcutaneous Q24H    insulin aspart U-100  5 Units Subcutaneous Q24H    insulin aspart U-100  5 Units Subcutaneous Q24H    senna-docusate 8.6-50 mg  1 tablet Per G Tube BID     PRN Meds:acetaminophen, dextrose 10 % in water (D10W), dextrose 50%, glucagon (human recombinant), insulin aspart U-100, oxyCODONE, sodium chloride 0.9%     Review of patient's allergies indicates:   Allergen Reactions    Pollen extracts     Lovastatin Rash     Not confirmed but pt skeptical     Objective:     Vital Signs (24h Range):  Temp:  [98.2 °F (36.8 °C)-98.7 °F (37.1 °C)] 98.7 °F (37.1 °C)  Pulse:  [67-87] 70  Resp:  [16-20] 18  SpO2:  [97 %-100 %] 100 %  BP: (123-140)/(71-82) 131/73       Lines/Drains/Airways     Drain                 Gastrostomy/Enterostomy 12/27/21 1152 Percutaneous endoscopic gastrostomy (PEG) LUQ 6 days          Airway                 Surgical Airway 12/27/21 1231 Shiley Cuffed 6 days          Peripheral Intravenous Line                 Midline Catheter Insertion/Assessment  - Single Lumen 01/02/22 1410 Right basilic vein (medial side of arm) other (see comments) <1 day                Physical Exam  NAD  6-0 DCS sutured in place, cuff down, neck tie secured  No peristomal blood, secretions white, no  blood.   Able to phonate with finger occlusion, primarily communicates with lip reading  Post-radiation fibrosis of neck      Significant Labs:  BMP:   Recent Labs   Lab 01/03/22  0532   *  204*   CL 99  99   CO2 27  27   BUN 29*  29*   CREATININE 0.8  0.8   CALCIUM 10.0  10.0   MG 1.9  1.9     CBC:   Recent Labs   Lab 01/02/22  1906   WBC 9.46   RBC 4.23*   HGB 11.9*   HCT 36.4*      MCV 86   MCH 28.1   MCHC 32.7           Assessment/Plan:     * Larynx neoplasm malignant  [id number] SCCa of the glottis/subglottis s/p IMRT (-10/30/21) with persistent disease, admitted for hemoptysis.    S/p DL/trach/PEG on 12/27. Awaiting definitive laryngectomy. Trach stoma placed superiorly through the tumor. Amenable to staying inpatient until planned laryngectomy.    - Keep NPO, mIVF   - Cont tube feeds   - Home meds restarted otherwise  - Keep cuffed trach in place leading up to surgery next week  - Bowel regimen  - Please call or page ENT with any concerns      Javon Ng MD  Otorhinolaryngology-Head & Neck Surgery  [facility]

## 2022-01-03 NOTE — RESPIRATORY THERAPY
RAPID RESPONSE RESPIRATORY THERAPY PROACTIVE NOTE           Time of visit: 901    Code Status: Full Code   : 1950  Bed: 1043/1043 A:   MRN: [id number]  Time spent at the bedside: < 15 min    SITUATION    Evaluated patient for: LDA Check     BACKGROUND    Patient has a past medical history of Allergy, Atrial fibrillation, Chronic anticoagulation, Diabetes mellitus, type 2, Hypertension, and Larynx neoplasm malignant.  Clinically Significant Surgical Hx: tracheostomy    24 Hours Vitals Range:  Temp:  [97.8 °F (36.6 °C)-98.7 °F (37.1 °C)]   Pulse:  [68-87]   Resp:  [16-20]   BP: (130-140)/(70-82)   SpO2:  [94 %-100 %]     Labs:    Recent Labs     22  0312 22  0445 22  0532   * 134* 132*  132*   K 4.1 4.2 4.6  4.6    101 99  99   CO2 26 23 27  27   CREATININE 0.8 0.8 0.8  0.8   * 191* 204*  204*   PHOS 2.8 3.1 3.4  3.4   MG 1.9 2.0 1.9  1.9        No results for input(s): PH, PCO2, PO2, HCO3, POCSATURATED, BE in the last 72 hours.    ASSESSMENT/INTERVENTIONS    Upon arrival in room pt resting and has no resp concerns at this time   All trach supplies are at bedside    Last VS   Temp: 97.8 °F (36.6 °C) (844)  Pulse: 77 (844)  Resp: 18 (0)  BP: 132/70 (844)  SpO2: 94 % ( 0909)    Level of Consciousness: Level of Consciousness (AVPU): alert  Respiratory Effort: Respiratory Effort: Normal,Unlabored Expansion/Accessory Muscle Usage: Expansion/Accessory Muscles/Retractions: no use of accessory muscles,no retractions,expansion symmetric  All Lung Field Breath Sounds: All Lung Fields Breath Sounds: Anterior:,Posterior:,Lateral:,diminished  EVER Breath Sounds: clear  LLL Breath Sounds: crackles, fine  RUL Breath Sounds: clear  RML Breath Sounds: clear  RLL Breath Sounds: crackles, fine  O2 Device/Concentration: Flow (L/min): 5, Oxygen Concentration (%): (S) 21 (nurse aware of change to room air), O2 Device (Oxygen Therapy): Trach  Collar  Surgical airway: Yes, Type: Shiley Size: 6   Ambu at bedside: Ambu bag with the patient?: Yes, Adult Ambu     Active Orders   Respiratory Care    Oxygen Continuous     Frequency: Continuous     Number of Occurrences: Until Specified     Order Questions:      Device type: Low flow      Device: Trach Collar      Titrate O2 per Oxygen Titration Protocol: Yes      To maintain SpO2 goal of: >= 90%      Notify MD of: Inability to achieve desired SpO2; Sudden change in patient status and requires 20% increase in FiO2; Patient requires >60% FiO2    Pulse Oximetry Continuous     Frequency: Continuous     Number of Occurrences: Until Specified    Routine tracheostomy care     Frequency: BID     Number of Occurrences: Until Specified       RECOMMENDATIONS    We recommend: RRT Recs: Continue POC per primary team.    ESCALATION        FOLLOW-UP    Please call back the Rapid Response RT, Sanjuana Conklin, RRT at x [phone] for any questions or concerns.

## 2022-01-03 NOTE — PT/OT/SLP PROGRESS
Speech Language Pathology Treatment    Patient Name:  Cyrus Batres Jr.   MRN:  [id number]  Admitting Diagnosis: Larynx neoplasm malignant    Recommendations:              Diet recommendations:  NPO, Liquid Diet Level: NPO   Aspiration Precautions: Continue alternate means of nutrition   General Precautions: Standard, respiratory,fall  Communication strategies:  mouthing words, written language    Subjective     Awake/alert    Pain/Comfort:  · Pain Rating 1: 0/10  · Pain Rating Post-Intervention 1: 0/10    Respiratory Status: trach collar    Objective:     Has the patient been evaluated by SLP for swallowing?   No  Keep patient NPO? Yes     Pt seen at bedside for ongoing pre-op laryngectomy education. Pt asked appropriate questions on communication, PO intake and lifestyle changes post laryngectomy surgery. SLP educated pt on laryngectomy ambassadors and that we can put him in contact with someone if he prefers that. After leaving room pt spoke to spouse at length via phone. She asked appropriate questions about laryngectomy surgery, recovery, and lifestyle changes post-op. She sounded more at ease about upcoming surgery after we spoke. She also asked to speak with , SLP secure chatted . SLP will follow up.     Assessment:     Cyrus Batres Jr. is a 71 y.o. male    Goals:   Multidisciplinary Problems     SLP Goals        Problem: SLP Goal    Goal Priority Disciplines Outcome   SLP Goal     SLP Ongoing, Progressing   Description: Speech Language Pathology Goals  Goals expected to be met by 1/4    1. Pt will tolerate PMSV for all waking hours with >92% spO2 to increase phonation, respiration and swallowing aspects  2. Pt/family will don/doff PMSV x1 during session with min cues  3. Pt will vocalize with  PMSV in place to increase verbal expression.                      Plan:     · Patient to be seen:  3 x/week   · Plan of Care reviewed with:  patient   · SLP Follow-Up:  Yes       Discharge recommendations:     TBD      Time Tracking:     SLP Treatment Date:   01/03/22  Speech Start Time:  0806  Speech Stop Time:  0844     Speech Total Time (min):  38 min    Billable Minutes: Self Care/Home Management Training 38    01/03/2022

## 2022-01-03 NOTE — SUBJECTIVE & OBJECTIVE
Interval History: No acute events over night.    Medications:  Continuous Infusions:   dextrose 10 % in water (D10W)       Scheduled Meds:   diltiaZEM  30 mg Per G Tube Q6H    enoxaparin  40 mg Subcutaneous Daily    famotidine  20 mg Per G Tube BID    insulin aspart U-100  5 Units Subcutaneous Q24H    insulin aspart U-100  5 Units Subcutaneous Q24H    insulin aspart U-100  5 Units Subcutaneous Q24H    insulin aspart U-100  5 Units Subcutaneous Q24H    insulin aspart U-100  5 Units Subcutaneous Q24H    insulin aspart U-100  5 Units Subcutaneous Q24H    senna-docusate 8.6-50 mg  1 tablet Per G Tube BID     PRN Meds:acetaminophen, dextrose 10 % in water (D10W), dextrose 50%, glucagon (human recombinant), insulin aspart U-100, oxyCODONE, sodium chloride 0.9%     Review of patient's allergies indicates:   Allergen Reactions    Pollen extracts     Lovastatin Rash     Not confirmed but pt skeptical     Objective:     Vital Signs (24h Range):  Temp:  [98.2 °F (36.8 °C)-98.7 °F (37.1 °C)] 98.7 °F (37.1 °C)  Pulse:  [67-87] 70  Resp:  [16-20] 18  SpO2:  [97 %-100 %] 100 %  BP: (123-140)/(71-82) 131/73       Lines/Drains/Airways     Drain                 Gastrostomy/Enterostomy 12/27/21 1152 Percutaneous endoscopic gastrostomy (PEG) LUQ 6 days          Airway                 Surgical Airway 12/27/21 1231 Shiley Cuffed 6 days          Peripheral Intravenous Line                 Midline Catheter Insertion/Assessment  - Single Lumen 01/02/22 1410 Right basilic vein (medial side of arm) other (see comments) <1 day                Physical Exam  NAD  6-0 DCS sutured in place, cuff down, neck tie secured  No peristomal blood, secretions white, no blood.   Able to phonate with finger occlusion, primarily communicates with lip reading  Post-radiation fibrosis of neck      Significant Labs:  BMP:   Recent Labs   Lab 01/03/22  0532   *  204*   CL 99  99   CO2 27  27   BUN 29*  29*   CREATININE 0.8  0.8   CALCIUM  10.0  10.0   MG 1.9  1.9     CBC:   Recent Labs   Lab 01/02/22  1906   WBC 9.46   RBC 4.23*   HGB 11.9*   HCT 36.4*      MCV 86   MCH 28.1   MCHC 32.7

## 2022-01-04 ENCOUNTER — PATIENT MESSAGE (OUTPATIENT)
Dept: SURGERY | Facility: HOSPITAL | Age: 72
End: 2022-01-04
Payer: MEDICARE

## 2022-01-04 PROBLEM — R04.2 HEMOPTYSIS: Status: RESOLVED | Noted: 2021-12-25 | Resolved: 2022-01-04

## 2022-01-04 LAB
ALBUMIN SERPL BCP-MCNC: 2.5 G/DL (ref 3.5–5.2)
ALP SERPL-CCNC: 237 U/L (ref 55–135)
ALT SERPL W/O P-5'-P-CCNC: 49 U/L (ref 10–44)
ANION GAP SERPL CALC-SCNC: 7 MMOL/L (ref 8–16)
AST SERPL-CCNC: 16 U/L (ref 10–40)
BILIRUB SERPL-MCNC: 0.9 MG/DL (ref 0.1–1)
BUN SERPL-MCNC: 33 MG/DL (ref 8–23)
CALCIUM SERPL-MCNC: 10.7 MG/DL (ref 8.7–10.5)
CHLORIDE SERPL-SCNC: 100 MMOL/L (ref 95–110)
CO2 SERPL-SCNC: 27 MMOL/L (ref 23–29)
CREAT SERPL-MCNC: 0.8 MG/DL (ref 0.5–1.4)
ERYTHROCYTE [DISTWIDTH] IN BLOOD BY AUTOMATED COUNT: 13.2 % (ref 11.5–14.5)
ERYTHROCYTE [DISTWIDTH] IN BLOOD BY AUTOMATED COUNT: 13.3 % (ref 11.5–14.5)
ERYTHROCYTE [DISTWIDTH] IN BLOOD BY AUTOMATED COUNT: 13.5 % (ref 11.5–14.5)
EST. GFR  (AFRICAN AMERICAN): >60 ML/MIN/1.73 M^2
EST. GFR  (NON AFRICAN AMERICAN): >60 ML/MIN/1.73 M^2
FINAL PATHOLOGIC DIAGNOSIS: NORMAL
GLUCOSE SERPL-MCNC: 147 MG/DL (ref 70–110)
HCT VFR BLD AUTO: 34.6 % (ref 40–54)
HCT VFR BLD AUTO: 35.3 % (ref 40–54)
HCT VFR BLD AUTO: 35.3 % (ref 40–54)
HGB BLD-MCNC: 11.2 G/DL (ref 14–18)
HGB BLD-MCNC: 11.5 G/DL (ref 14–18)
HGB BLD-MCNC: 11.8 G/DL (ref 14–18)
Lab: NORMAL
MAGNESIUM SERPL-MCNC: 2 MG/DL (ref 1.6–2.6)
MCH RBC QN AUTO: 27.6 PG (ref 27–31)
MCH RBC QN AUTO: 28 PG (ref 27–31)
MCH RBC QN AUTO: 28.2 PG (ref 27–31)
MCHC RBC AUTO-ENTMCNC: 32.4 G/DL (ref 32–36)
MCHC RBC AUTO-ENTMCNC: 32.6 G/DL (ref 32–36)
MCHC RBC AUTO-ENTMCNC: 33.4 G/DL (ref 32–36)
MCV RBC AUTO: 84 FL (ref 82–98)
MCV RBC AUTO: 85 FL (ref 82–98)
MCV RBC AUTO: 87 FL (ref 82–98)
PHOSPHATE SERPL-MCNC: 3 MG/DL (ref 2.7–4.5)
PLATELET # BLD AUTO: 386 K/UL (ref 150–450)
PLATELET # BLD AUTO: 398 K/UL (ref 150–450)
PLATELET # BLD AUTO: 468 K/UL (ref 150–450)
PMV BLD AUTO: 10.1 FL (ref 9.2–12.9)
PMV BLD AUTO: 10.2 FL (ref 9.2–12.9)
PMV BLD AUTO: 9.6 FL (ref 9.2–12.9)
POCT GLUCOSE: 137 MG/DL (ref 70–110)
POCT GLUCOSE: 176 MG/DL (ref 70–110)
POCT GLUCOSE: 185 MG/DL (ref 70–110)
POCT GLUCOSE: 191 MG/DL (ref 70–110)
POCT GLUCOSE: 192 MG/DL (ref 70–110)
POCT GLUCOSE: 202 MG/DL (ref 70–110)
POCT GLUCOSE: 208 MG/DL (ref 70–110)
POTASSIUM SERPL-SCNC: 4.6 MMOL/L (ref 3.5–5.1)
PROT SERPL-MCNC: 6.2 G/DL (ref 6–8.4)
RBC # BLD AUTO: 3.97 M/UL (ref 4.6–6.2)
RBC # BLD AUTO: 4.16 M/UL (ref 4.6–6.2)
RBC # BLD AUTO: 4.21 M/UL (ref 4.6–6.2)
SODIUM SERPL-SCNC: 134 MMOL/L (ref 136–145)
WBC # BLD AUTO: 10.96 K/UL (ref 3.9–12.7)
WBC # BLD AUTO: 12.69 K/UL (ref 3.9–12.7)
WBC # BLD AUTO: 13.48 K/UL (ref 3.9–12.7)

## 2022-01-04 PROCEDURE — 63600175 PHARM REV CODE 636 W HCPCS: Mod: HCNC

## 2022-01-04 PROCEDURE — 83735 ASSAY OF MAGNESIUM: CPT | Mod: HCNC | Performed by: OTOLARYNGOLOGY

## 2022-01-04 PROCEDURE — 27000221 HC OXYGEN, UP TO 24 HOURS: Mod: HCNC

## 2022-01-04 PROCEDURE — 84100 ASSAY OF PHOSPHORUS: CPT | Mod: HCNC | Performed by: OTOLARYNGOLOGY

## 2022-01-04 PROCEDURE — 85027 COMPLETE CBC AUTOMATED: CPT | Mod: HCNC | Performed by: STUDENT IN AN ORGANIZED HEALTH CARE EDUCATION/TRAINING PROGRAM

## 2022-01-04 PROCEDURE — 80053 COMPREHEN METABOLIC PANEL: CPT | Mod: HCNC | Performed by: OTOLARYNGOLOGY

## 2022-01-04 PROCEDURE — 25000003 PHARM REV CODE 250: Mod: HCNC | Performed by: STUDENT IN AN ORGANIZED HEALTH CARE EDUCATION/TRAINING PROGRAM

## 2022-01-04 PROCEDURE — 20600001 HC STEP DOWN PRIVATE ROOM: Mod: HCNC

## 2022-01-04 PROCEDURE — 99900026 HC AIRWAY MAINTENANCE (STAT): Mod: HCNC

## 2022-01-04 PROCEDURE — 99232 PR SUBSEQUENT HOSPITAL CARE,LEVL II: ICD-10-PCS | Mod: HCNC,,, | Performed by: NURSE PRACTITIONER

## 2022-01-04 PROCEDURE — 85027 COMPLETE CBC AUTOMATED: CPT | Mod: 91,HCNC | Performed by: STUDENT IN AN ORGANIZED HEALTH CARE EDUCATION/TRAINING PROGRAM

## 2022-01-04 PROCEDURE — 99900035 HC TECH TIME PER 15 MIN (STAT): Mod: HCNC

## 2022-01-04 PROCEDURE — 36415 COLL VENOUS BLD VENIPUNCTURE: CPT | Mod: HCNC | Performed by: STUDENT IN AN ORGANIZED HEALTH CARE EDUCATION/TRAINING PROGRAM

## 2022-01-04 PROCEDURE — 99232 SBSQ HOSP IP/OBS MODERATE 35: CPT | Mod: HCNC,,, | Performed by: NURSE PRACTITIONER

## 2022-01-04 PROCEDURE — 94761 N-INVAS EAR/PLS OXIMETRY MLT: CPT | Mod: HCNC

## 2022-01-04 RX ADMIN — ENOXAPARIN SODIUM 40 MG: 40 INJECTION SUBCUTANEOUS at 04:01

## 2022-01-04 RX ADMIN — SENNOSIDES AND DOCUSATE SODIUM 1 TABLET: 50; 8.6 TABLET ORAL at 08:01

## 2022-01-04 RX ADMIN — INSULIN ASPART 1 UNITS: 100 INJECTION, SOLUTION INTRAVENOUS; SUBCUTANEOUS at 08:01

## 2022-01-04 RX ADMIN — INSULIN ASPART 5 UNITS: 100 INJECTION, SOLUTION INTRAVENOUS; SUBCUTANEOUS at 12:01

## 2022-01-04 RX ADMIN — INSULIN ASPART 1 UNITS: 100 INJECTION, SOLUTION INTRAVENOUS; SUBCUTANEOUS at 12:01

## 2022-01-04 RX ADMIN — INSULIN ASPART 5 UNITS: 100 INJECTION, SOLUTION INTRAVENOUS; SUBCUTANEOUS at 08:01

## 2022-01-04 RX ADMIN — INSULIN ASPART 5 UNITS: 100 INJECTION, SOLUTION INTRAVENOUS; SUBCUTANEOUS at 05:01

## 2022-01-04 RX ADMIN — DILTIAZEM HYDROCHLORIDE 30 MG: 30 TABLET, FILM COATED ORAL at 12:01

## 2022-01-04 RX ADMIN — DILTIAZEM HYDROCHLORIDE 30 MG: 30 TABLET, FILM COATED ORAL at 05:01

## 2022-01-04 RX ADMIN — FAMOTIDINE 20 MG: 20 TABLET ORAL at 08:01

## 2022-01-04 RX ADMIN — INSULIN ASPART 5 UNITS: 100 INJECTION, SOLUTION INTRAVENOUS; SUBCUTANEOUS at 04:01

## 2022-01-04 RX ADMIN — INSULIN ASPART 5 UNITS: 100 INJECTION, SOLUTION INTRAVENOUS; SUBCUTANEOUS at 07:01

## 2022-01-04 NOTE — RESPIRATORY THERAPY
RAPID RESPONSE RESPIRATORY THERAPY PROACTIVE NOTE           Time of visit: 1223    Code Status: Full Code   : 1950  Bed: 1043/1043 A:   MRN: [id number]  Time spent at the bedside: < 15 min    SITUATION    Evaluated patient for: LDA Check     BACKGROUND    Patient has a past medical history of Allergy, Atrial fibrillation, Chronic anticoagulation, Diabetes mellitus, type 2, Hypertension, and Larynx neoplasm malignant.  Clinically Significant Surgical Hx: tracheostomy    24 Hours Vitals Range:  Temp:  [98.3 °F (36.8 °C)-99.4 °F (37.4 °C)]   Pulse:  [68-95]   Resp:  [16-18]   BP: (129-141)/(71-82)   SpO2:  [94 %-99 %]     Labs:    Recent Labs     22  0445 22  0532 22  1036   * 132*  132* 134*   K 4.2 4.6  4.6 4.6    99  99 100   CO2 23 27  27 27   CREATININE 0.8 0.8  0.8 0.8   * 204*  204* 147*   PHOS 3.1 3.4  3.4 3.0   MG 2.0 1.9  1.9 2.0        No results for input(s): PH, PCO2, PO2, HCO3, POCSATURATED, BE in the last 72 hours.    ASSESSMENT/INTERVENTIONS    Upon arrival in room pt resting and all trach supplies are at bedsdie    Last VS   Temp: 98.3 °F (36.8 °C) ( 113)  Pulse: 71 ( 1141)  Resp: 18 (1130)  BP: 141/76 ( 1130)  SpO2: 94 % (1130)    Level of Consciousness: Level of Consciousness (AVPU): alert  Respiratory Effort: Respiratory Effort: Normal,Unlabored Expansion/Accessory Muscle Usage: Expansion/Accessory Muscles/Retractions: no use of accessory muscles,no retractions,expansion symmetric  All Lung Field Breath Sounds: All Lung Fields Breath Sounds: Anterior:,Lateral:,diminished  EVER Breath Sounds: clear  LLL Breath Sounds: crackles, fine  RUL Breath Sounds: clear  RML Breath Sounds: clear  RLL Breath Sounds: crackles, fine  O2 Device/Concentration: Flow (L/min): 8, Oxygen Concentration (%): 21, O2 Device (Oxygen Therapy): Trach Collar  Surgical airway: Yes, Type: Shiley Size: 6   Ambu at bedside: Ambu bag with the patient?: Yes,  Adult Ambu     Active Orders   Respiratory Care    Oxygen Continuous     Frequency: Continuous     Number of Occurrences: Until Specified     Order Questions:      Device type: Low flow      Device: Trach Collar      Titrate O2 per Oxygen Titration Protocol: Yes      To maintain SpO2 goal of: >= 90%      Notify MD of: Inability to achieve desired SpO2; Sudden change in patient status and requires 20% increase in FiO2; Patient requires >60% FiO2    Pulse Oximetry Continuous     Frequency: Continuous     Number of Occurrences: Until Specified    Routine tracheostomy care     Frequency: BID     Number of Occurrences: Until Specified    Sputum Induction Once     Frequency: Once     Number of Occurrences: 1 Occurrences       RECOMMENDATIONS    We recommend: RRT Recs: Continue POC per primary team.    ESCALATION        FOLLOW-UP    Please call back the Rapid Response RT, Sanjuana Conklin RRT at x [phone] for any questions or concerns.

## 2022-01-04 NOTE — ASSESSMENT & PLAN NOTE
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    S/p DL/trach/PEG on 12/27. Awaiting definitive laryngectomy. Trach stoma placed superiorly through the tumor. Amenable to staying inpatient until planned laryngectomy.    - Keep NPO, mIVF   - Cont tube feeds   - Home meds restarted otherwise  - Keep cuffed trach in place leading up to surgery next week  - Bowel regimen  - Please call or page ENT with any concerns

## 2022-01-04 NOTE — PT/OT/SLP PROGRESS
Speech Language Pathology      Cyrus Batres Jr.  MRN: [id number]    Pt seen from [id number] for ongoing pre-op education. Pt scheduled for laryngectomy 1/6. Pt had no further questions prior to surgery. SLP will follow up post-op for laryngectomy education.   1/4/2022

## 2022-01-04 NOTE — NURSING
Pt free from injury throughout shift. No c/o SOB. Pt attempts to clear throat and creamy sputum expectorate. Pt ambulated in the rothman without difficulty. His tube feeding is running at goal with no residual noted. Flushed given with med administration. Pt voids per urinal and has not had a BM since last noted. Prescribed laxative given. No c/o pain expressed. Pt reminded to call for needs. WCTM.

## 2022-01-04 NOTE — PROGRESS NOTES
Nael Carey - [facility]  Otorhinolaryngology-Head & Neck Surgery  Progress Note    Subjective:     Post-Op Info:  Procedure(s) (LRB):  CREATION, TRACHEOSTOMY (N/A)  LARYNGOSCOPY, DIRECT, WITH BRONCHOSCOPY (N/A)  INSERTION, PEG TUBE (N/A)   8 Days Post-Op  Hospital Day: 11     Interval History: No acute events over night.    Medications:  Continuous Infusions:   dextrose 10 % in water (D10W)       Scheduled Meds:   diltiaZEM  30 mg Per G Tube Q6H    enoxaparin  40 mg Subcutaneous Daily    famotidine  20 mg Per G Tube BID    insulin aspart U-100  5 Units Subcutaneous Q24H    insulin aspart U-100  5 Units Subcutaneous Q24H    insulin aspart U-100  5 Units Subcutaneous Q24H    insulin aspart U-100  5 Units Subcutaneous Q24H    insulin aspart U-100  5 Units Subcutaneous Q24H    insulin aspart U-100  5 Units Subcutaneous Q24H    insulin detemir U-100  10 Units Subcutaneous Daily    senna-docusate 8.6-50 mg  1 tablet Per G Tube BID     PRN Meds:acetaminophen, dextrose 10 % in water (D10W), dextrose 50%, glucagon (human recombinant), insulin aspart U-100, oxyCODONE, sodium chloride 0.9%     Review of patient's allergies indicates:   Allergen Reactions    Pollen extracts     Lovastatin Rash     Not confirmed but pt skeptical     Objective:     Vital Signs (24h Range):  Temp:  [97.8 °F (36.6 °C)-99.4 °F (37.4 °C)] 98.7 °F (37.1 °C)  Pulse:  [68-95] 68  Resp:  [15-18] 16  SpO2:  [94 %-99 %] 96 %  BP: (129-140)/(70-76) 129/71       Lines/Drains/Airways     Drain                 Gastrostomy/Enterostomy 12/27/21 1152 Percutaneous endoscopic gastrostomy (PEG) LUQ 7 days          Airway                 Surgical Airway 12/27/21 1231 Shiley Cuffed 7 days          Peripheral Intravenous Line                 Midline Catheter Insertion/Assessment  - Single Lumen 01/02/22 1410 Right basilic vein (medial side of arm) other (see comments) 1 day                Physical Exam    Significant Labs:  BMP:   Recent Labs   Lab  01/03/22  0532   *  204*   CL 99  99   CO2 27  27   BUN 29*  29*   CREATININE 0.8  0.8   CALCIUM 10.0  10.0   MG 1.9  1.9     CBC:   Recent Labs   Lab 01/04/22  0014   WBC 10.96   RBC 3.97*   HGB 11.2*   HCT 34.6*      MCV 87   MCH 28.2   MCHC 32.4           Assessment/Plan:     * 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    S/p DL/trach/PEG on 12/27. Awaiting definitive laryngectomy. Trach stoma placed superiorly through the tumor. Amenable to staying inpatient until planned laryngectomy.    - Keep NPO, mIVF   - Cont tube feeds   - Home meds restarted otherwise  - Keep cuffed trach in place leading up to surgery next week  - Bowel regimen  - Please call or page ENT with any concerns        Javon Ng MD  Otorhinolaryngology-Head & Neck Surgery  [facility]

## 2022-01-04 NOTE — PLAN OF CARE
[facility]ael [facility]iegler [facility]  Discharge Reassessment    Primary Care Provider: Diana Calhoun MD    Expected Discharge Date: 1/14/2022       Patient expected to go to OR 1/6/2022 for Laryngectomy, etc.  Patient expected to discharge home with needs TBD +/- 1/14/2022.  Will continue to follow for needs.    Tahmina Sterling RN, [facility] (ext: [phone])      Reassessment (most recent)     Discharge Reassessment - 01/04/22 1505        Discharge Reassessment    Assessment Type Discharge Planning Reassessment     Discharge Plan A Home with family     Discharge Plan B Home Health        Post-Acute Status    Post-Acute Authorization Other     Other Status No Post-Acute Service Needs

## 2022-01-04 NOTE — PROGRESS NOTES
Nael Carey - [facility]  Endocrinology  Progress Note    Admit Date: 12/25/2021     Reason for Consult: Management of T2DM, Hyperglycemia     Surgical Procedure and Date:   12/27/21  CREATION, TRACHEOSTOMY (N/A)  LARYNGOSCOPY, DIRECT, WITH BRONCHOSCOPY (N/A)  INSERTION, PEG TUBE (N/A)     Diabetes diagnosis year: 2010    Home Diabetes Medications:  Ozempic stopped on 10/18/21 by Dena Silveira as a1c below goal     How often checking glucose at home?  Once daily    BG readings on regimen: 150s  Hypoglycemia on the regimen?  No  Missed doses on regimen?  n/a    Diabetes Complications include:     None     Complicating diabetes co morbidities:   pAfib, HTN, active cancer       HPI:   Patient is a 71 y.o. 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 He presented as a transfer from [place] for ENT evaluation. He initially presented to the ER for worsening hemoptysis. Laryngeal videostroboscopy 11/8/21 notable for webbing across membranous vocal folds, granular changes infraglottically, firbinous debris across anterior commissure, post surgical stiffness of bilateral true vocal folds, phonatory supraglottic hyperfunction, occasional plica ventricularis, thick salivary secretions, pooled in pyriforms, diffuse mild laryngopharyngeal edema.He is now s/p the above procedure. He was last seen by MISSY Gutierrez on 10/18/21 for DM management. Endocrinology consulted for management of T2DM.            Lab Results   Component Value Date    HGBA1C 5.5 10/05/2021           Interval HPI:   Overnight events: Remains in [facility]. POD 8. BG better controlled with the addition of basal insulin yesterday. Remains on tube feeds at goal rate.   Eating:   NPO  Nausea: No  Hypoglycemia and intervention: No  Fever: No  TPN and/or TF: Yes  If yes, type of TF/TPN and rate: Impact Peptide 1.5 at 45 ml/hr     /82 (BP Location: Right arm, Patient Position:  "Lying)   Pulse 81   Temp 98.5 °F (36.9 °C) (Oral)   Resp 16   Ht 5' 6" (1.676 m)   Wt 66 kg (145 lb 8.1 oz)   SpO2 (!) 94%   BMI 23.48 kg/m²     Labs Reviewed and Include    No results for input(s): GLU, CALCIUM, ALBUMIN, PROT, NA, K, CO2, CL, BUN, CREATININE, ALKPHOS, ALT, AST, BILITOT in the last 24 hours.  Lab Results   Component Value Date    WBC 10.96 01/04/2022    HGB 11.2 (L) 01/04/2022    HCT 34.6 (L) 01/04/2022    MCV 87 01/04/2022     01/04/2022     No results for input(s): TSH, FREET4 in the last 168 hours.  Lab Results   Component Value Date    HGBA1C 5.5 10/05/2021       Nutritional status:   Body mass index is 23.48 kg/m².  Lab Results   Component Value Date    ALBUMIN 2.5 (L) 01/03/2022    ALBUMIN 2.5 (L) 01/03/2022    ALBUMIN 2.5 (L) 01/02/2022     No results found for: PREALBUMIN    Estimated Creatinine Clearance: 77.5 mL/min (based on SCr of 0.8 mg/dL).    Accu-Checks  Recent Labs     01/02/22  1221 01/02/22  1637 01/02/22 2018 01/03/22  0031 01/03/22  0512 01/03/22  1241 01/03/22  1628 01/03/22 2012 01/04/22  0014 01/04/22  0504   POCTGLUCOSE 172* 254* 177* 208* 219* 188* 288* 171* 202* 137*       Current Medications and/or Treatments Impacting Glycemic Control  Immunotherapy:    Immunosuppressants     None        Steroids:   Hormones (From admission, onward)            None        Pressors:    Autonomic Drugs (From admission, onward)            None        Hyperglycemia/Diabetes Medications:   Antihyperglycemics (From admission, onward)            Start     Stop Route Frequency Ordered    01/04/22 2100  insulin detemir U-100 pen 10 Units         -- SubQ Nightly 01/04/22 0834    01/03/22 1600  insulin aspart U-100 pen 5 Units         -- SubQ Every 24 hours (non-standard times) 01/02/22 1848 01/03/22 1200  insulin aspart U-100 pen 5 Units         -- SubQ Every 24 hours (non-standard times) 01/02/22 1848 01/03/22 0800  insulin aspart U-100 pen 5 Units         -- SubQ Every 24 " hours (non-standard times) 01/02/22 1848    01/03/22 0400  insulin aspart U-100 pen 5 Units         -- SubQ Every 24 hours (non-standard times) 01/02/22 1848    01/03/22 0000  insulin aspart U-100 pen 5 Units         -- SubQ Every 24 hours (non-standard times) 01/02/22 1848    01/02/22 2000  insulin aspart U-100 pen 5 Units         -- SubQ Every 24 hours (non-standard times) 01/02/22 1848    12/31/21 1133  insulin aspart U-100 pen 0-5 Units         -- SubQ Every 4 hours PRN 12/31/21 1033          ASSESSMENT and PLAN    * Larynx neoplasm malignant  Managed per primary team  Optimize BG control        Type 2 diabetes mellitus with hyperglycemia, without long-term current use of insulin  BG goal 140-180    Continue Levemir 10 units q HS  (0.3 u/kg dosing)   Continue Novolog 5 units q 4 hrs while on tube feeds (HOLD if tube feeds are stopped or BG < 100)  Continue Low Dose Correction Scale  BG monitoring q 4 hrs while NPO/tube feeds    ** Please call Endocrine for any BG related issues **    Discharge plans: TBD    On enteral nutrition  Enteral nutrition may increase blood glucose.  Optimize BG control          Destini Guy, NP  Endocrinology  [facility]

## 2022-01-04 NOTE — SUBJECTIVE & OBJECTIVE
"Interval HPI:   Overnight events: Remains in [facility]. POD 8. BG better controlled with the addition of basal insulin yesterday. Remains on tube feeds at goal rate.   Eating:   NPO  Nausea: No  Hypoglycemia and intervention: No  Fever: No  TPN and/or TF: Yes  If yes, type of TF/TPN and rate: Impact Peptide 1.5 at 45 ml/hr     /82 (BP Location: Right arm, Patient Position: Lying)   Pulse 81   Temp 98.5 °F (36.9 °C) (Oral)   Resp 16   Ht 5' 6" (1.676 m)   Wt 66 kg (145 lb 8.1 oz)   SpO2 (!) 94%   BMI 23.48 kg/m²     Labs Reviewed and Include    No results for input(s): GLU, CALCIUM, ALBUMIN, PROT, NA, K, CO2, CL, BUN, CREATININE, ALKPHOS, ALT, AST, BILITOT in the last 24 hours.  Lab Results   Component Value Date    WBC 10.96 01/04/2022    HGB 11.2 (L) 01/04/2022    HCT 34.6 (L) 01/04/2022    MCV 87 01/04/2022     01/04/2022     No results for input(s): TSH, FREET4 in the last 168 hours.  Lab Results   Component Value Date    HGBA1C 5.5 10/05/2021       Nutritional status:   Body mass index is 23.48 kg/m².  Lab Results   Component Value Date    ALBUMIN 2.5 (L) 01/03/2022    ALBUMIN 2.5 (L) 01/03/2022    ALBUMIN 2.5 (L) 01/02/2022     No results found for: PREALBUMIN    Estimated Creatinine Clearance: 77.5 mL/min (based on SCr of 0.8 mg/dL).    Accu-Checks  Recent Labs     01/02/22  1221 01/02/22  1637 01/02/22 2018 01/03/22  0031 01/03/22  0512 01/03/22  1241 01/03/22  1628 01/03/22 2012 01/04/22  0014 01/04/22  0504   POCTGLUCOSE 172* 254* 177* 208* 219* 188* 288* 171* 202* 137*       Current Medications and/or Treatments Impacting Glycemic Control  Immunotherapy:    Immunosuppressants     None        Steroids:   Hormones (From admission, onward)            None        Pressors:    Autonomic Drugs (From admission, onward)            None        Hyperglycemia/Diabetes Medications:   Antihyperglycemics (From admission, onward)            Start     Stop Route Frequency Ordered    01/04/22 2100  " insulin detemir U-100 pen 10 Units         -- SubQ Nightly 01/04/22 0834    01/03/22 1600  insulin aspart U-100 pen 5 Units         -- SubQ Every 24 hours (non-standard times) 01/02/22 1848 01/03/22 1200  insulin aspart U-100 pen 5 Units         -- SubQ Every 24 hours (non-standard times) 01/02/22 1848    01/03/22 0800  insulin aspart U-100 pen 5 Units         -- SubQ Every 24 hours (non-standard times) 01/02/22 1848 01/03/22 0400  insulin aspart U-100 pen 5 Units         -- SubQ Every 24 hours (non-standard times) 01/02/22 1848 01/03/22 0000  insulin aspart U-100 pen 5 Units         -- SubQ Every 24 hours (non-standard times) 01/02/22 1848    01/02/22 2000  insulin aspart U-100 pen 5 Units         -- SubQ Every 24 hours (non-standard times) 01/02/22 1848 12/31/21 1133  insulin aspart U-100 pen 0-5 Units         -- SubQ Every 4 hours PRN 12/31/21 1033

## 2022-01-04 NOTE — ASSESSMENT & PLAN NOTE
BG goal 140-180    Continue Levemir 10 units q HS  (0.3 u/kg dosing)   Continue Novolog 5 units q 4 hrs while on tube feeds (HOLD if tube feeds are stopped or BG < 100)  Continue Low Dose Correction Scale  BG monitoring q 4 hrs while NPO/tube feeds    ** Please call Endocrine for any BG related issues **    Discharge plans: TBD

## 2022-01-04 NOTE — SUBJECTIVE & OBJECTIVE
Interval History: No acute events over night.    Medications:  Continuous Infusions:   dextrose 10 % in water (D10W)       Scheduled Meds:   diltiaZEM  30 mg Per G Tube Q6H    enoxaparin  40 mg Subcutaneous Daily    famotidine  20 mg Per G Tube BID    insulin aspart U-100  5 Units Subcutaneous Q24H    insulin aspart U-100  5 Units Subcutaneous Q24H    insulin aspart U-100  5 Units Subcutaneous Q24H    insulin aspart U-100  5 Units Subcutaneous Q24H    insulin aspart U-100  5 Units Subcutaneous Q24H    insulin aspart U-100  5 Units Subcutaneous Q24H    insulin detemir U-100  10 Units Subcutaneous Daily    senna-docusate 8.6-50 mg  1 tablet Per G Tube BID     PRN Meds:acetaminophen, dextrose 10 % in water (D10W), dextrose 50%, glucagon (human recombinant), insulin aspart U-100, oxyCODONE, sodium chloride 0.9%     Review of patient's allergies indicates:   Allergen Reactions    Pollen extracts     Lovastatin Rash     Not confirmed but pt skeptical     Objective:     Vital Signs (24h Range):  Temp:  [97.8 °F (36.6 °C)-99.4 °F (37.4 °C)] 98.7 °F (37.1 °C)  Pulse:  [68-95] 68  Resp:  [15-18] 16  SpO2:  [94 %-99 %] 96 %  BP: (129-140)/(70-76) 129/71       Lines/Drains/Airways     Drain                 Gastrostomy/Enterostomy 12/27/21 1152 Percutaneous endoscopic gastrostomy (PEG) LUQ 7 days          Airway                 Surgical Airway 12/27/21 1231 Shiley Cuffed 7 days          Peripheral Intravenous Line                 Midline Catheter Insertion/Assessment  - Single Lumen 01/02/22 1410 Right basilic vein (medial side of arm) other (see comments) 1 day                Physical Exam    Significant Labs:  BMP:   Recent Labs   Lab 01/03/22  0532   *  204*   CL 99  99   CO2 27  27   BUN 29*  29*   CREATININE 0.8  0.8   CALCIUM 10.0  10.0   MG 1.9  1.9     CBC:   Recent Labs   Lab 01/04/22  0014   WBC 10.96   RBC 3.97*   HGB 11.2*   HCT 34.6*      MCV 87   MCH 28.2   MCHC 32.4

## 2022-01-05 LAB
ALBUMIN SERPL BCP-MCNC: 2.8 G/DL (ref 3.5–5.2)
ALP SERPL-CCNC: 242 U/L (ref 55–135)
ALT SERPL W/O P-5'-P-CCNC: 41 U/L (ref 10–44)
ANION GAP SERPL CALC-SCNC: 11 MMOL/L (ref 8–16)
AST SERPL-CCNC: 13 U/L (ref 10–40)
BILIRUB SERPL-MCNC: 1.1 MG/DL (ref 0.1–1)
BUN SERPL-MCNC: 34 MG/DL (ref 8–23)
CALCIUM SERPL-MCNC: 10.6 MG/DL (ref 8.7–10.5)
CHLORIDE SERPL-SCNC: 101 MMOL/L (ref 95–110)
CO2 SERPL-SCNC: 23 MMOL/L (ref 23–29)
CREAT SERPL-MCNC: 0.8 MG/DL (ref 0.5–1.4)
ERYTHROCYTE [DISTWIDTH] IN BLOOD BY AUTOMATED COUNT: 13.2 % (ref 11.5–14.5)
ERYTHROCYTE [DISTWIDTH] IN BLOOD BY AUTOMATED COUNT: 13.3 % (ref 11.5–14.5)
EST. GFR  (AFRICAN AMERICAN): >60 ML/MIN/1.73 M^2
EST. GFR  (NON AFRICAN AMERICAN): >60 ML/MIN/1.73 M^2
ESTIMATED AVG GLUCOSE: 123 MG/DL (ref 68–131)
GLUCOSE SERPL-MCNC: 103 MG/DL (ref 70–110)
HBA1C MFR BLD: 5.9 % (ref 4–5.6)
HCT VFR BLD AUTO: 35.6 % (ref 40–54)
HCT VFR BLD AUTO: 36.4 % (ref 40–54)
HGB BLD-MCNC: 11.7 G/DL (ref 14–18)
HGB BLD-MCNC: 11.8 G/DL (ref 14–18)
MAGNESIUM SERPL-MCNC: 2 MG/DL (ref 1.6–2.6)
MCH RBC QN AUTO: 28.3 PG (ref 27–31)
MCH RBC QN AUTO: 28.4 PG (ref 27–31)
MCHC RBC AUTO-ENTMCNC: 32.4 G/DL (ref 32–36)
MCHC RBC AUTO-ENTMCNC: 32.9 G/DL (ref 32–36)
MCV RBC AUTO: 86 FL (ref 82–98)
MCV RBC AUTO: 88 FL (ref 82–98)
PHOSPHATE SERPL-MCNC: 3.7 MG/DL (ref 2.7–4.5)
PLATELET # BLD AUTO: 463 K/UL (ref 150–450)
PLATELET # BLD AUTO: 491 K/UL (ref 150–450)
PMV BLD AUTO: 10.1 FL (ref 9.2–12.9)
PMV BLD AUTO: 9.8 FL (ref 9.2–12.9)
POCT GLUCOSE: 122 MG/DL (ref 70–110)
POCT GLUCOSE: 176 MG/DL (ref 70–110)
POCT GLUCOSE: 176 MG/DL (ref 70–110)
POCT GLUCOSE: 187 MG/DL (ref 70–110)
POCT GLUCOSE: 220 MG/DL (ref 70–110)
POCT GLUCOSE: 239 MG/DL (ref 70–110)
POTASSIUM SERPL-SCNC: 4.4 MMOL/L (ref 3.5–5.1)
PROT SERPL-MCNC: 6 G/DL (ref 6–8.4)
RBC # BLD AUTO: 4.14 M/UL (ref 4.6–6.2)
RBC # BLD AUTO: 4.16 M/UL (ref 4.6–6.2)
SARS-COV-2 RDRP RESP QL NAA+PROBE: NEGATIVE
SODIUM SERPL-SCNC: 135 MMOL/L (ref 136–145)
WBC # BLD AUTO: 13.31 K/UL (ref 3.9–12.7)
WBC # BLD AUTO: 13.72 K/UL (ref 3.9–12.7)

## 2022-01-05 PROCEDURE — 80053 COMPREHEN METABOLIC PANEL: CPT | Mod: HCNC | Performed by: STUDENT IN AN ORGANIZED HEALTH CARE EDUCATION/TRAINING PROGRAM

## 2022-01-05 PROCEDURE — 99232 SBSQ HOSP IP/OBS MODERATE 35: CPT | Mod: HCNC,,, | Performed by: NURSE PRACTITIONER

## 2022-01-05 PROCEDURE — 25000003 PHARM REV CODE 250: Mod: HCNC | Performed by: STUDENT IN AN ORGANIZED HEALTH CARE EDUCATION/TRAINING PROGRAM

## 2022-01-05 PROCEDURE — U0002 COVID-19 LAB TEST NON-CDC: HCPCS | Mod: HCNC | Performed by: STUDENT IN AN ORGANIZED HEALTH CARE EDUCATION/TRAINING PROGRAM

## 2022-01-05 PROCEDURE — 85027 COMPLETE CBC AUTOMATED: CPT | Mod: HCNC | Performed by: STUDENT IN AN ORGANIZED HEALTH CARE EDUCATION/TRAINING PROGRAM

## 2022-01-05 PROCEDURE — 99232 PR SUBSEQUENT HOSPITAL CARE,LEVL II: ICD-10-PCS | Mod: HCNC,,, | Performed by: NURSE PRACTITIONER

## 2022-01-05 PROCEDURE — 20600001 HC STEP DOWN PRIVATE ROOM: Mod: HCNC

## 2022-01-05 PROCEDURE — 83036 HEMOGLOBIN GLYCOSYLATED A1C: CPT | Mod: HCNC | Performed by: STUDENT IN AN ORGANIZED HEALTH CARE EDUCATION/TRAINING PROGRAM

## 2022-01-05 PROCEDURE — 36415 COLL VENOUS BLD VENIPUNCTURE: CPT | Mod: HCNC | Performed by: STUDENT IN AN ORGANIZED HEALTH CARE EDUCATION/TRAINING PROGRAM

## 2022-01-05 PROCEDURE — 27000221 HC OXYGEN, UP TO 24 HOURS: Mod: HCNC

## 2022-01-05 PROCEDURE — 94761 N-INVAS EAR/PLS OXIMETRY MLT: CPT | Mod: HCNC

## 2022-01-05 PROCEDURE — 99900035 HC TECH TIME PER 15 MIN (STAT): Mod: HCNC

## 2022-01-05 PROCEDURE — 99900026 HC AIRWAY MAINTENANCE (STAT): Mod: HCNC

## 2022-01-05 PROCEDURE — 83735 ASSAY OF MAGNESIUM: CPT | Mod: HCNC | Performed by: STUDENT IN AN ORGANIZED HEALTH CARE EDUCATION/TRAINING PROGRAM

## 2022-01-05 PROCEDURE — 63600175 PHARM REV CODE 636 W HCPCS: Mod: HCNC

## 2022-01-05 PROCEDURE — 84100 ASSAY OF PHOSPHORUS: CPT | Mod: HCNC | Performed by: STUDENT IN AN ORGANIZED HEALTH CARE EDUCATION/TRAINING PROGRAM

## 2022-01-05 RX ADMIN — DILTIAZEM HYDROCHLORIDE 30 MG: 30 TABLET, FILM COATED ORAL at 12:01

## 2022-01-05 RX ADMIN — INSULIN ASPART 5 UNITS: 100 INJECTION, SOLUTION INTRAVENOUS; SUBCUTANEOUS at 08:01

## 2022-01-05 RX ADMIN — INSULIN ASPART 5 UNITS: 100 INJECTION, SOLUTION INTRAVENOUS; SUBCUTANEOUS at 05:01

## 2022-01-05 RX ADMIN — ENOXAPARIN SODIUM 40 MG: 40 INJECTION SUBCUTANEOUS at 05:01

## 2022-01-05 RX ADMIN — FAMOTIDINE 20 MG: 20 TABLET ORAL at 08:01

## 2022-01-05 RX ADMIN — DILTIAZEM HYDROCHLORIDE 30 MG: 30 TABLET, FILM COATED ORAL at 05:01

## 2022-01-05 RX ADMIN — INSULIN ASPART 5 UNITS: 100 INJECTION, SOLUTION INTRAVENOUS; SUBCUTANEOUS at 12:01

## 2022-01-05 RX ADMIN — FAMOTIDINE 20 MG: 20 TABLET ORAL at 09:01

## 2022-01-05 RX ADMIN — SENNOSIDES AND DOCUSATE SODIUM 1 TABLET: 50; 8.6 TABLET ORAL at 09:01

## 2022-01-05 RX ADMIN — INSULIN ASPART 2 UNITS: 100 INJECTION, SOLUTION INTRAVENOUS; SUBCUTANEOUS at 05:01

## 2022-01-05 RX ADMIN — DILTIAZEM HYDROCHLORIDE 30 MG: 30 TABLET, FILM COATED ORAL at 11:01

## 2022-01-05 NOTE — ASSESSMENT & PLAN NOTE
BG goal 140-180  HOLD Novolog once tube feeds are stopped tonight.    Continue Levemir 10 units q HS  (0.3 u/kg dosing)   Continue Novolog 5 units q 4 hrs while on tube feeds (HOLD if tube feeds are stopped or BG < 100)  Continue Low Dose Correction Scale  BG monitoring q 4 hrs while NPO/tube feeds    ** Please call Endocrine for any BG related issues **    Discharge plans: TBD

## 2022-01-05 NOTE — SUBJECTIVE & OBJECTIVE
"Interval HPI:   Overnight events: Remains in [facility]. POD 9. BG within goal ranges on current SQ insulin regimen. Plan for OR [place] with tube feeds on hold at midnight.   Eating:   NPO  Nausea: No  Hypoglycemia and intervention: No  Fever: No  TPN and/or TF: Yes  If yes, type of TF/TPN and rate: Impact Peptide at 1.5 at 45 ml/hr    /71 (BP Location: Right arm, Patient Position: Lying)   Pulse 79   Temp 98.1 °F (36.7 °C) (Oral)   Resp 18   Ht 5' 6" (1.676 m)   Wt 66 kg (145 lb 8.1 oz)   SpO2 (!) 94%   BMI 23.48 kg/m²     Labs Reviewed and Include    Recent Labs   Lab 01/05/22  0503      CALCIUM 10.6*   ALBUMIN 2.8*   PROT 6.0   *   K 4.4   CO2 23      BUN 34*   CREATININE 0.8   ALKPHOS 242*   ALT 41   AST 13   BILITOT 1.1*     Lab Results   Component Value Date    WBC 13.72 (H) 01/05/2022    HGB 11.8 (L) 01/05/2022    HCT 36.4 (L) 01/05/2022    MCV 88 01/05/2022     (H) 01/05/2022     No results for input(s): TSH, FREET4 in the last 168 hours.  Lab Results   Component Value Date    HGBA1C 5.5 10/05/2021       Nutritional status:   Body mass index is 23.48 kg/m².  Lab Results   Component Value Date    ALBUMIN 2.8 (L) 01/05/2022    ALBUMIN 2.5 (L) 01/04/2022    ALBUMIN 2.5 (L) 01/03/2022    ALBUMIN 2.5 (L) 01/03/2022     No results found for: PREALBUMIN    Estimated Creatinine Clearance: 77.5 mL/min (based on SCr of 0.8 mg/dL).    Accu-Checks  Recent Labs     01/04/22  0014 01/04/22  0504 01/04/22  0843 01/04/22  1231 01/04/22  1654 01/04/22  1956 01/04/22  2330 01/05/22  0516 01/05/22  0847 01/05/22  1210   POCTGLUCOSE 202* 137* 185* 191* 192* 208* 176* 122* 187* 176*       Current Medications and/or Treatments Impacting Glycemic Control  Immunotherapy:    Immunosuppressants     None        Steroids:   Hormones (From admission, onward)            None        Pressors:    Autonomic Drugs (From admission, onward)            None        Hyperglycemia/Diabetes Medications: "   Antihyperglycemics (From admission, onward)            Start     Stop Route Frequency Ordered    01/04/22 2100  insulin detemir U-100 pen 10 Units         -- SubQ Nightly 01/04/22 0834    01/03/22 1600  insulin aspart U-100 pen 5 Units         -- SubQ Every 24 hours (non-standard times) 01/02/22 1848    01/03/22 1200  insulin aspart U-100 pen 5 Units         -- SubQ Every 24 hours (non-standard times) 01/02/22 1848    01/03/22 0800  insulin aspart U-100 pen 5 Units         -- SubQ Every 24 hours (non-standard times) 01/02/22 1848    01/03/22 0400  insulin aspart U-100 pen 5 Units         -- SubQ Every 24 hours (non-standard times) 01/02/22 1848    01/03/22 0000  insulin aspart U-100 pen 5 Units         -- SubQ Every 24 hours (non-standard times) 01/02/22 1848    01/02/22 2000  insulin aspart U-100 pen 5 Units         -- SubQ Every 24 hours (non-standard times) 01/02/22 1848 12/31/21 1133  insulin aspart U-100 pen 0-5 Units         -- SubQ Every 4 hours PRN 12/31/21 1033

## 2022-01-05 NOTE — PLAN OF CARE
Patient A/O x4. Patient denies pain. TF going @ 45 cc which is goal.  Patient up and about independently. Trach in place with #6 carmen. Able to cough up secretions through trach. Patient voiding per urinal. Accuchecks q4. Call light placed in reach. Frequent rounding done. WCTM.

## 2022-01-05 NOTE — ASSESSMENT & PLAN NOTE
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    S/p DL/trach/PEG on 12/27. Awaiting definitive laryngectomy. Trach stoma placed superiorly through the tumor.     - to OR for TL, BND, Total thyroidectomy, likely ALT free flap tomorrow. Hold tube feeds at midnight.     - Keep NPO, mIVF     - Home meds restarted otherwise  - Keep cuffed trach in place leading up to surgery  - Bowel regimen  - Please call or page ENT with any concerns

## 2022-01-05 NOTE — ANESTHESIA PREPROCEDURE EVALUATION
[facility]  Anesthesia Pre-Operative Evaluation         Patient Name: Cyrus Batres Jr.  YOB: 1950  MRN: [id number]    SUBJECTIVE:     Pre-operative Evaluation for Procedure(s) (LRB):  LARYNGECTOMY (N/A)  DISSECTION, NECK (Bilateral)  CREATION, FLAP, MUSCLE ROTATION (N/A)     01/05/2022     Cyrus Batres Jr. is a 71 y.o. 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 Persistent disease, admitted for hemoptysis.     S/p DL/trach/PEG on 12/27. Awaiting definitive laryngectomy. Trach stoma placed superiorly through the tumor.    He now presents for the above procedure(s).    Previous Airway (12/27/21):  Performed by: Jai Patton MD  Authorized by: Reji Kowalski MD     Induction:  Intravenous    Intubated:  Postinduction    Mask Ventilation:  Easy mask    Attempts:  1    Attempted By:  Staff anesthesiologist    Method of Intubation:  Video laryngoscopy    Blade:  Alamo 3    Laryngeal View Grade: Grade IIA - cords partially seen      Difficult Airway Encountered?: No      Complications:  None    Airway Device:  Oral endotracheal tube    Airway Device Size:  7.5    Style/Cuff Inflation:  Cuffed (inflated to minimal occlusive pressure)    Tube secured:  20    Secured at:  The lips    Placement Verified By:  Capnometry    Complicating Factors:  Oropharyngeal edema or fat and bleeding in oropharynx    Findings Post-Intubation:  BS equal bilateral      LDA:       Midline Catheter Insertion/Assessment  - Single Lumen 01/02/22 1410 Right basilic vein (medial side of arm) other (see comments) (Active)            Gastrostomy/Enterostomy 12/27/21 1152 Percutaneous endoscopic gastrostomy (PEG) LUQ (Active)       Drips: None documented.      Patient Active Problem List   Diagnosis     Melanocytic nevus    Body mass index (BMI) of 29.0-29.9 in adult    Benign hypertension    Overweight    Paroxysmal atrial fibrillation    Type 2 diabetes mellitus with diabetic arthropathy, with long-term current use of insulin    Fatty liver disease, nonalcoholic    False positive serological test for hepatitis C    Hyperlipidemia    Type 2 diabetes mellitus with hyperglycemia, without long-term current use of insulin    Gastroesophageal reflux disease without esophagitis    Type 2 diabetes mellitus with hyperglycemia    Vitamin B12 deficiency    Hyperuricemia    Hypovitaminosis D    Proteinuria    On enteral nutrition    Larynx neoplasm malignant    Dehydration    NSVT (nonsustained ventricular tachycardia)    Hard to intubate       Review of patient's allergies indicates:   Allergen Reactions    Pollen extracts     Lovastatin Rash     Not confirmed but pt skeptical       Current Inpatient Medications:      Current Outpatient Medications   Medication Instructions    apixaban (ELIQUIS) 5 mg, Oral, 2 times daily    aspirin (ECOTRIN) 81 MG EC tablet 1 tablet, Oral, Daily    blood sugar diagnostic (BLOOD GLUCOSE TEST) Strp 1 strip, Misc.(Non-Drug; Combo Route), 2 times daily before meals    ciclopirox (LOPROX) 0.77 % Crea Topical (Top), 2 times daily    cyanocobalamin 500 mcg, Oral, Daily    diltiaZEM (CARDIZEM CD) 120 mg, Oral, Daily    ergocalciferol (ERGOCALCIFEROL) 50,000 unit Cap TAKE 1 CAPSULE TWICE WEEKLY    esomeprazole (NEXIUM) 20 mg, Oral, Every morning    fluticasone (FLONASE) 50 mcg/actuation nasal spray USE 2 SPRAYS TO EACH NOSTRIL ONCE A DAY    HYDROcodone-acetaminophen (NORCO) 5-325 mg per tablet 1 tablet, Oral, Every 6 hours PRN    ibuprofen (ADVIL,MOTRIN) 400 mg, Oral, Every 6 hours PRN    losartan (COZAAR) 100 MG tablet TAKE 1 TABLET BY MOUTH EVERY DAY    PAIN RELIEF EXTRA STRENGTH 500 mg, Oral, Every 6 hours PRN    pen needle, diabetic (BD ULTRA-FINE YULISSA PEN  "NEEDLE) 32 gauge x 5/32" Ndle Use once weekly.    SHINGRIX, PF, 50 mcg/0.5 mL injection TO BE ADMINISTERED BY PHARMACIST FOR IMMUNIZATION       Past Surgical History:   Procedure Laterality Date    DIRECT LARYNGOBRONCHOSCOPY N/A 12/27/2021    Procedure: LARYNGOSCOPY, DIRECT, WITH BRONCHOSCOPY;  Surgeon: Flex Espinosa MD;  Location: [facility] OR [address] [place] [address];  Service: ENT;  Laterality: N/A;    LARYNGOSCOPY N/A 8/4/2020    Procedure: Suspension microlaryngoscopy with biopsy, possible KTP laser treatment/excision;  Surgeon: Stew Noel MD;  Location: [address];  Service: ENT;  Laterality: N/A;  Microscope, telescopes, tower, microinstruments, KTP laser, rep conf# [id number] IC 7/28.    LARYNGOSCOPY N/A 3/16/2021    Procedure: Suspension microlaryngoscopy with excision of lesion, possible CO2 laser;  Surgeon: Stew Noel MD;  Location: [address];  Service: ENT;  Laterality: N/A;  Microscope, telescopes, tower, microinstruments, CO2 laser, rep conf# [id number] IC 3/4.    LARYNGOSCOPY N/A 4/1/2021    Procedure: Suspension microlaryngoscopy with KTP laser excision of lesion;  Surgeon: Stew Noel MD;  Location: [address];  Service: ENT;  Laterality: N/A;  Microscope, telescopes, tower, microinstruments, 70 degree scope, vocal fold , KTP laser, rep conf# [id number] BC    LARYNGOSCOPY N/A 12/9/2021    Procedure: Suspension microlaryngoscopy with biopsy;  Surgeon: Stew Noel MD;  Location: [address];  Service: ENT;  Laterality: N/A;  Microscope, telescopes, tower, microinstruments    MICROLARYNGOSCOPY N/A 3/17/2020    Procedure: MICROLARYNGOSCOPY;  Surgeon: Jung Xiao MD;  Location: [facility];  Service: ENT;  Laterality: N/A;  Laser Microlaryngoscopy  NEED TO SCHEDULE LASER from formerly [facility] [employer] [phone]    TRACHEOSTOMY N/A 12/27/2021    Procedure: CREATION, TRACHEOSTOMY;  Surgeon: Flex Espinosa MD;  Location: [facility] OR [address] [place] [address];  Service: ENT;  Laterality: N/A; "       Social History     Substance and Sexual Activity   Drug Use No     Tobacco Use: Low Risk     Smoking Tobacco Use: Never Smoker    Smokeless Tobacco Use: Never Used     Alcohol Use: Not on file         OBJECTIVE:     Vital Signs Range (Last 24H):  Temp:  [36.7 °C (98.1 °F)-36.9 °C (98.5 °F)]   Pulse:  [60-84]   Resp:  [16-20]   BP: (118-139)/(62-72)   SpO2:  [93 %-98 %]       Significant Labs    Heme Profile  Lab Results   Component Value Date    WBC 13.72 (H) 01/05/2022    HGB 11.8 (L) 01/05/2022    HCT 36.4 (L) 01/05/2022     (H) 01/05/2022       Coagulation Studies  Lab Results   Component Value Date    LABPROT 11.8 12/27/2021    INR 1.1 12/27/2021    APTT 25.0 12/27/2021       BMP  Lab Results   Component Value Date     (L) 01/05/2022    K 4.4 01/05/2022     01/05/2022    CO2 23 01/05/2022    BUN 34 (H) 01/05/2022    CREATININE 0.8 01/05/2022    MG 2.0 01/05/2022    PHOS 3.7 01/05/2022    CAION 1.24 07/02/2019       Liver Function Tests  Lab Results   Component Value Date    AST 13 01/05/2022    ALT 41 01/05/2022    ALKPHOS 242 (H) 01/05/2022    BILITOT 1.1 (H) 01/05/2022    PROT 6.0 01/05/2022    ALBUMIN 2.8 (L) 01/05/2022       Lipid Profile  Lab Results   Component Value Date    CHOL 174 11/18/2020    HDL 37 (L) 11/18/2020    TRIG 131 11/18/2020       Endocrine Profile  Lab Results   Component Value Date    HGBA1C 5.9 (H) 01/05/2022    TSH 3.472 10/05/2021         Cardiac Studies    EKG:   Results for orders placed or performed in visit on 12/25/21   EKG 12-lead    Collection Time: 12/25/21  1:49 AM    Narrative    Test Reason :     Vent. Rate : 099 BPM     Atrial Rate : 099 BPM     P-R Int : 148 ms          QRS Dur : 080 ms      QT Int : 324 ms       P-R-T Axes : 052 019 015 degrees     QTc Int : 415 ms    Normal sinus rhythm  Normal ECG  When compared with ECG of 24-DEC-2021 19:19,  T wave inversion now evident in Inferior leads  Confirmed by SUSANA DONOVAN, VIC ([id number]) on  12/25/2021 11:02:36 AM    Referred By: CANDIDA TY           Confirmed By:VIC MEZA MD       TTE  No results found for this or any previous visit.      ANJU  No results found for this or any previous visit.      ASSESSMENT/PLAN:       Anesthesia Evaluation    I have reviewed the Patient Summary Reports.    I have reviewed the Nursing Notes. I have reviewed the NPO Status.   I have reviewed the Medications.     Review of Systems  Anesthesia Hx:  No problems with previous Anesthesia  History of prior surgery of interest to airway management or planning: Denies Family Hx of Anesthesia complications.   Denies Personal Hx of Anesthesia complications.   Hematology/Oncology:  Hematology Normal   Oncology Normal     EENT/Dental:EENT/Dental Normal   Cardiovascular:   Hypertension    Pulmonary:  Pulmonary Normal    Renal/:  Renal/ Normal     Hepatic/GI:   GERD    Musculoskeletal:  Musculoskeletal Normal    Neurological:  Neurology Normal    Endocrine:   Diabetes    Dermatological:  Skin Normal    Psych:  Psychiatric Normal           Physical Exam  General:  Well nourished    Airway/Jaw/Neck:  Airway Findings: Pre-Existing Airway Tube(s): Tracheostomy tube       Eyes/Ears/Nose:  EYES/EARS/NOSE FINDINGS: Normal    Chest/Lungs:  Chest/Lungs Clear    Heart/Vascular:  Heart Findings: Normal Heart murmur: negative       Mental Status:  Mental Status Findings: Normal        Anesthesia Plan  Type of Anesthesia, risks & benefits discussed:  Anesthesia Type:  general    Patient's Preference:   Plan Factors:          Intra-op Monitoring Plan: standard ASA monitors and arterial line  Intra-op Monitoring Plan Comments:   Post Op Pain Control Plan: multimodal analgesia, IV/PO Opioids PRN and per primary service following discharge from PACU  Post Op Pain Control Plan Comments:     Induction:   IV  Beta Blocker:         Informed Consent: Patient understands risks and agrees with Anesthesia plan.  Questions answered. Anesthesia  consent signed with patient.  ASA Score: 3     Day of Surgery Review of History & Physical:    H&P update referred to the surgeon.         Ready For Surgery From Anesthesia Perspective.

## 2022-01-05 NOTE — PROGRESS NOTES
"[facility] - [facility]  Adult Nutrition  Progress Note    SUMMARY       Recommendations    1. Suggest TF of Isosource of 1.5 at 45 mL/hr to provide 1620 kcal, 73 g protein, and 825 mL free fluid.   -Bolus: 5 cans/day to provide 1875 kcal, 85 g protein, and 955 mL free fluid.    2. As medically able, ADAT to diabetic with texture per SLP.     3. RD following.    Goals: meet % EEN/EPN by RD follow-up  Nutrition Goal Status: new  Communication of RD Recs:  (POC)    Assessment and Plan    Nutrition Problem  Excessive Protein Intake     Related to (etiology):   Excessive Enteral Nutrition Infusion     Signs and Symptoms (as evidenced by):   TF meeting >110% EPN     Intervention (treatment strategy):  Collaboration of nutrition care with other providers  Enteral Nutrition      Nutrition Diagnosis Status:   Continuing     Reason for Assessment    Reason For Assessment: RD follow-up  Diagnosis:  (Larynx neoplasm malignant)  Relevant Medical History: AFib, DM, HTN  Interdisciplinary Rounds: did not attend  General Information Comments: Post-op trach/PEG. Pt resting in bed, no family present. Pt is tolerating TF and denies N/V/D. Pt expressed some concern over constipation (last BM was 12/23 per patient) with c/o mild abdominal cramping. Monitor bowel regemin. Pt expressed some concern that his blood glucose levels are being impacted by TF.  Nutrition Discharge Planning: Adequate nutriyion via TF    Nutrition Risk Screen    Nutrition Risk Screen: tube feeding or parenteral nutrition    Nutrition/Diet History    Spiritual, Cultural Beliefs, Hinduism Practices, Values that Affect Care: no  Food Allergies: NKFA  Factors Affecting Nutritional Intake: altered gastrointestinal function    Anthropometrics    Temp: 98.4 °F (36.9 °C)  Height Method: Stated  Height: 5' 6" (167.6 cm)  Height (inches): 66 in  Weight Method: Bed Scale  Weight: 66 kg (145 lb 8.1 oz)  Weight (lb): 145.51 lb  Ideal Body Weight (IBW), Male: 142 lb  % Ideal " Body Weight, Male (lb): 102.47 %  BMI (Calculated): 23.5  BMI Grade: 18.5-24.9 - normal       Lab/Procedures/Meds    Pertinent Labs Reviewed: reviewed  Pertinent Labs Comments: Glucose 187, BUN 34  Pertinent Medications Reviewed: reviewed  Pertinent Medications Comments: Insulin, glucagon    Estimated/Assessed Needs    Weight Used For Calorie Calculations: 66 kg (145 lb 8.1 oz)  Energy Calorie Requirements (kcal): 1700 kcal/day  Energy Need Method: [place]-St Jeor  Protein Requirements: 79 gm/day  Weight Used For Protein Calculations: 66 kg (145 lb 8.1 oz)  Fluid Requirements (mL): 1700 mL (1 mL/kcal or per MD)  Estimated Fluid Requirement Method: RDA Method  RDA Method (mL): 1700  CHO Requirement: 212 gm/day      Nutrition Prescription Ordered    Current Diet Order: NPO  Current Nutrition Support Formula Ordered: Impact Peptide 1.5  Current Nutrition Support Rate Ordered: 45 (ml)  Current Nutrition Support Frequency Ordered: mL/hr    Evaluation of Received Nutrient/Fluid Intake    Enteral Calories (kcal): 1700  Enteral Protein (gm): 107  Enteral (Free Water) Fluid (mL): 872  % Kcal Needs: 100%  % Protein Needs: 135%  I/O: +2143.3 mL since admit  Energy Calories Required: meeting needs  Protein Required: exceeds needs  Fluid Required:  (per MD)  Comments: Last BM 12/23 (per patient)  Tolerance: tolerating  % Intake of Estimated Energy Needs: 75 - 100 %  % Meal Intake: NPO    Nutrition Risk    Level of Risk/Frequency of Follow-up:  (f/u 1 x wk)     Monitor and Evaluation    Food and Nutrient Intake: energy intake,enteral nutrition intake  Food and Nutrient Adminstration: enteral and parenteral nutrition administration  Physical Activity and Function: nutrition-related ADLs and IADLs  Anthropometric Measurements: weight,weight change,body mass index  Biochemical Data, Medical Tests and Procedures: electrolyte and renal panel,gastrointestinal profile,glucose/endocrine profile,inflammatory profile,lipid  profile  Nutrition-Focused Physical Findings: overall appearance     Nutrition Follow-Up    RD Follow-up?: Yes  Follow-Up Date: 1/12/2022

## 2022-01-05 NOTE — PROGRESS NOTES
[facility] - [facility]  Endocrinology  Progress Note    Admit Date: 12/25/2021     Reason for Consult: Management of T2DM, Hyperglycemia     Surgical Procedure and Date:   12/27/21  CREATION, TRACHEOSTOMY (N/A)  LARYNGOSCOPY, DIRECT, WITH BRONCHOSCOPY (N/A)  INSERTION, PEG TUBE (N/A)     Diabetes diagnosis year: 2010    Home Diabetes Medications:  Ozempic stopped on 10/18/21 by Dena Silveira as a1c below goal     How often checking glucose at home?  Once daily    BG readings on regimen: 150s  Hypoglycemia on the regimen?  No  Missed doses on regimen?  n/a    Diabetes Complications include:     None     Complicating diabetes co morbidities:   pAfib, HTN, active cancer       HPI:   Patient is a 71 y.o. 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 He presented as a transfer from [place] for ENT evaluation. He initially presented to the ER for worsening hemoptysis. Laryngeal videostroboscopy 11/8/21 notable for webbing across membranous vocal folds, granular changes infraglottically, firbinous debris across anterior commissure, post surgical stiffness of bilateral true vocal folds, phonatory supraglottic hyperfunction, occasional plica ventricularis, thick salivary secretions, pooled in pyriforms, diffuse mild laryngopharyngeal edema.He is now s/p the above procedure. He was last seen by MISSY Gutierrez on 10/18/21 for DM management. Endocrinology consulted for management of T2DM.            Lab Results   Component Value Date    HGBA1C 5.5 10/05/2021           Interval HPI:   Overnight events: Remains in [facility]. NAEON. POD 9. BG within goal ranges on current SQ insulin regimen. Plan for OR jasper with tube feeds on hold at midnight.   Eating:   NPO  Nausea: No  Hypoglycemia and intervention: No  Fever: No  TPN and/or TF: Yes  If yes, type of TF/TPN and rate: Impact Peptide at 1.5 at 45 ml/hr    /71 (BP Location: Right arm, Patient  "Position: Lying)   Pulse 79   Temp 98.1 °F (36.7 °C) (Oral)   Resp 18   Ht 5' 6" (1.676 m)   Wt 66 kg (145 lb 8.1 oz)   SpO2 (!) 94%   BMI 23.48 kg/m²     Labs Reviewed and Include    Recent Labs   Lab 01/05/22  0503      CALCIUM 10.6*   ALBUMIN 2.8*   PROT 6.0   *   K 4.4   CO2 23      BUN 34*   CREATININE 0.8   ALKPHOS 242*   ALT 41   AST 13   BILITOT 1.1*     Lab Results   Component Value Date    WBC 13.72 (H) 01/05/2022    HGB 11.8 (L) 01/05/2022    HCT 36.4 (L) 01/05/2022    MCV 88 01/05/2022     (H) 01/05/2022     No results for input(s): TSH, FREET4 in the last 168 hours.  Lab Results   Component Value Date    HGBA1C 5.5 10/05/2021       Nutritional status:   Body mass index is 23.48 kg/m².  Lab Results   Component Value Date    ALBUMIN 2.8 (L) 01/05/2022    ALBUMIN 2.5 (L) 01/04/2022    ALBUMIN 2.5 (L) 01/03/2022    ALBUMIN 2.5 (L) 01/03/2022     No results found for: PREALBUMIN    Estimated Creatinine Clearance: 77.5 mL/min (based on SCr of 0.8 mg/dL).    Accu-Checks  Recent Labs     01/04/22  0014 01/04/22  0504 01/04/22  0843 01/04/22  1231 01/04/22  1654 01/04/22  1956 01/04/22  2330 01/05/22  0516 01/05/22  0847 01/05/22  1210   POCTGLUCOSE 202* 137* 185* 191* 192* 208* 176* 122* 187* 176*       Current Medications and/or Treatments Impacting Glycemic Control  Immunotherapy:    Immunosuppressants     None        Steroids:   Hormones (From admission, onward)            None        Pressors:    Autonomic Drugs (From admission, onward)            None        Hyperglycemia/Diabetes Medications:   Antihyperglycemics (From admission, onward)            Start     Stop Route Frequency Ordered    01/04/22 2100  insulin detemir U-100 pen 10 Units         -- SubQ Nightly 01/04/22 0834    01/03/22 1600  insulin aspart U-100 pen 5 Units         -- SubQ Every 24 hours (non-standard times) 01/02/22 1848    01/03/22 1200  insulin aspart U-100 pen 5 Units         -- SubQ Every 24 hours " (non-standard times) 01/02/22 1848    01/03/22 0800  insulin aspart U-100 pen 5 Units         -- SubQ Every 24 hours (non-standard times) 01/02/22 1848    01/03/22 0400  insulin aspart U-100 pen 5 Units         -- SubQ Every 24 hours (non-standard times) 01/02/22 1848    01/03/22 0000  insulin aspart U-100 pen 5 Units         -- SubQ Every 24 hours (non-standard times) 01/02/22 1848    01/02/22 2000  insulin aspart U-100 pen 5 Units         -- SubQ Every 24 hours (non-standard times) 01/02/22 1848    12/31/21 1133  insulin aspart U-100 pen 0-5 Units         -- SubQ Every 4 hours PRN 12/31/21 1033          ASSESSMENT and PLAN    * Larynx neoplasm malignant  Managed per primary team  Optimize BG control        Type 2 diabetes mellitus with hyperglycemia, without long-term current use of insulin  BG goal 140-180  HOLD Novolog once tube feeds are stopped tonight.    Continue Levemir 10 units q HS  (0.3 u/kg dosing)   Continue Novolog 5 units q 4 hrs while on tube feeds (HOLD if tube feeds are stopped or BG < 100)  Continue Low Dose Correction Scale  BG monitoring q 4 hrs while NPO/tube feeds    ** Please call Endocrine for any BG related issues **    Discharge plans: TBD    On enteral nutrition  Enteral nutrition may increase blood glucose.  Optimize BG control          Destini Guy, NP  Endocrinology  [facility]

## 2022-01-05 NOTE — PROGRESS NOTES
[facility] - [facility]  Otorhinolaryngology-Head & Neck Surgery  Progress Note    Subjective:     Post-Op Info:  Procedure(s) (LRB):  CREATION, TRACHEOSTOMY (N/A)  LARYNGOSCOPY, DIRECT, WITH BRONCHOSCOPY (N/A)  INSERTION, PEG TUBE (N/A)   9 Days Post-Op  Hospital Day: 12     Interval History: NAEON. Ready for surgery tomorrow.    Medications:  Continuous Infusions:   dextrose 10 % in water (D10W)       Scheduled Meds:   diltiaZEM  30 mg Per G Tube Q6H    enoxaparin  40 mg Subcutaneous Daily    famotidine  20 mg Per G Tube BID    insulin aspart U-100  5 Units Subcutaneous Q24H    insulin aspart U-100  5 Units Subcutaneous Q24H    insulin aspart U-100  5 Units Subcutaneous Q24H    insulin aspart U-100  5 Units Subcutaneous Q24H    insulin aspart U-100  5 Units Subcutaneous Q24H    insulin aspart U-100  5 Units Subcutaneous Q24H    insulin detemir U-100  10 Units Subcutaneous QHS    senna-docusate 8.6-50 mg  1 tablet Per G Tube BID     PRN Meds:acetaminophen, dextrose 10 % in water (D10W), dextrose 50%, glucagon (human recombinant), insulin aspart U-100, oxyCODONE, sodium chloride 0.9%     Review of patient's allergies indicates:   Allergen Reactions    Pollen extracts     Lovastatin Rash     Not confirmed but pt skeptical     Objective:     Vital Signs (24h Range):  Temp:  [98.1 °F (36.7 °C)-98.5 °F (36.9 °C)] 98.4 °F (36.9 °C)  Pulse:  [60-86] 77  Resp:  [16-20] 16  SpO2:  [93 %-98 %] 94 %  BP: (118-141)/(62-76) 124/62     Date 01/05/22 0700 - 01/06/22 0659   Shift 4724-0358 6917-6075 3805-7411 24 Hour Total   INTAKE   NG/   303   Shift Total(mL/kg) 303(4.6)   303(4.6)   OUTPUT   Shift Total(mL/kg)       Weight (kg) 66 66 66 66     Lines/Drains/Airways     Drain                 Gastrostomy/Enterostomy 12/27/21 1152 Percutaneous endoscopic gastrostomy (PEG) LUQ 8 days          Airway                 Surgical Airway 12/27/21 1231 Shiley Cuffed 8 days          Peripheral Intravenous Line                  Midline Catheter Insertion/Assessment  - Single Lumen 01/02/22 1410 Right basilic vein (medial side of arm) other (see comments) 2 days                Physical Exam  Awake, alert  EOMI  NCAT  Neck - soft, 6-0 shiley cuffed trach in place.     Significant Labs:  BMP:   Recent Labs   Lab 01/05/22  0503         CO2 23   BUN 34*   CREATININE 0.8   CALCIUM 10.6*   MG 2.0     CBC:   Recent Labs   Lab 01/05/22  0805   WBC 13.72*   RBC 4.16*   HGB 11.8*   HCT 36.4*   *   MCV 88   MCH 28.4   MCHC 32.4       Significant Diagnostics:  I have reviewed and interpreted all pertinent imaging results/findings within the past 24 hours.    Assessment/Plan:     * Larynx neoplasm malignant  [id number] SCCa of the glottis/subglottis s/p IMRT (-10/30/21) with persistent disease, admitted for hemoptysis.    S/p DL/trach/PEG on 12/27. Awaiting definitive laryngectomy. Trach stoma placed superiorly through the tumor.     - to OR for TL, BND, Total thyroidectomy, likely ALT free flap tomorrow. Hold tube feeds at midnight.     - Keep NPO, mIVF     - Home meds restarted otherwise  - Keep cuffed trach in place leading up to surgery  - Bowel regimen  - Please call or page ENT with any concerns        Yo Herrera MD  Otorhinolaryngology-Head & Neck Surgery  [facility]

## 2022-01-05 NOTE — RESPIRATORY THERAPY
RAPID RESPONSE RESPIRATORY THERAPY PROACTIVE NOTE           Time of visit: 1021    Code Status: Full Code   : 1950  Bed: 1043/1043 A:   MRN: [id number]  Time spent at the bedside: < 15 min    SITUATION    Evaluated patient for: LDA Check     BACKGROUND    Patient has a past medical history of Allergy, Atrial fibrillation, Chronic anticoagulation, Diabetes mellitus, type 2, Hypertension, and Larynx neoplasm malignant.  Clinically Significant Surgical Hx: tracheostomy    24 Hours Vitals Range:  Temp:  [98.1 °F (36.7 °C)-98.5 °F (36.9 °C)]   Pulse:  [60-86]   Resp:  [16-20]   BP: (118-141)/(62-76)   SpO2:  [93 %-98 %]     Labs:    Recent Labs     22  0532 22  1036 22  0503   *  132* 134* 135*   K 4.6  4.6 4.6 4.4   CL 99  99 100 101   CO2 27  27 27 23   CREATININE 0.8  0.8 0.8 0.8   *  204* 147* 103   PHOS 3.4  3.4 3.0 3.7   MG 1.9  1.9 2.0 2.0        No results for input(s): PH, PCO2, PO2, HCO3, POCSATURATED, BE in the last 72 hours.    ASSESSMENT/INTERVENTIONS    Upon arrival in room pt resting and all trach supplies are at bedside    Last VS   Temp: 98.4 °F (36.9 °C) (843)  Pulse: 77 (843)  Resp: 16 (843)  BP: 124/62 (843)  SpO2: 94 % (843)    Level of Consciousness: Level of Consciousness (AVPU): alert  Respiratory Effort: Respiratory Effort: Unlabored,Normal Expansion/Accessory Muscle Usage: Expansion/Accessory Muscles/Retractions: no use of accessory muscles,no retractions,expansion symmetric  All Lung Field Breath Sounds: All Lung Fields Breath Sounds: Anterior:,Posterior:,Lateral:,diminished (upper airway coarse)  EVER Breath Sounds: clear  LLL Breath Sounds: crackles, fine  RUL Breath Sounds: clear  RML Breath Sounds: clear  RLL Breath Sounds: crackles, fine  O2 Device/Concentration: Flow (L/min): 8, Oxygen Concentration (%): 35, O2 Device (Oxygen Therapy): Trach Collar  Surgical airway: Yes, Type: Shiley Size: 6   Ambu at bedside:  Ambu bag with the patient?: Yes, Adult Ambu     Active Orders   Respiratory Care    Oxygen Continuous     Frequency: Continuous     Number of Occurrences: Until Specified     Order Questions:      Device type: Low flow      Device: Trach Collar      Titrate O2 per Oxygen Titration Protocol: Yes      To maintain SpO2 goal of: >= 90%      Notify MD of: Inability to achieve desired SpO2; Sudden change in patient status and requires 20% increase in FiO2; Patient requires >60% FiO2    Pulse Oximetry Continuous     Frequency: Continuous     Number of Occurrences: Until Specified    Routine tracheostomy care     Frequency: BID     Number of Occurrences: Until Specified    Sputum Induction Once     Frequency: Once     Number of Occurrences: 1 Occurrences       RECOMMENDATIONS    We recommend: RRT Recs: Continue POC per primary team.    ESCALATION        FOLLOW-UP    Please call back the Rapid Response RT, Sanjuana Conklin RRT at x [phone] for any questions or concerns.

## 2022-01-05 NOTE — SUBJECTIVE & OBJECTIVE
Interval History: NAEON. Ready for surgery tomorrow.    Medications:  Continuous Infusions:   dextrose 10 % in water (D10W)       Scheduled Meds:   diltiaZEM  30 mg Per G Tube Q6H    enoxaparin  40 mg Subcutaneous Daily    famotidine  20 mg Per G Tube BID    insulin aspart U-100  5 Units Subcutaneous Q24H    insulin aspart U-100  5 Units Subcutaneous Q24H    insulin aspart U-100  5 Units Subcutaneous Q24H    insulin aspart U-100  5 Units Subcutaneous Q24H    insulin aspart U-100  5 Units Subcutaneous Q24H    insulin aspart U-100  5 Units Subcutaneous Q24H    insulin detemir U-100  10 Units Subcutaneous QHS    senna-docusate 8.6-50 mg  1 tablet Per G Tube BID     PRN Meds:acetaminophen, dextrose 10 % in water (D10W), dextrose 50%, glucagon (human recombinant), insulin aspart U-100, oxyCODONE, sodium chloride 0.9%     Review of patient's allergies indicates:   Allergen Reactions    Pollen extracts     Lovastatin Rash     Not confirmed but pt skeptical     Objective:     Vital Signs (24h Range):  Temp:  [98.1 °F (36.7 °C)-98.5 °F (36.9 °C)] 98.4 °F (36.9 °C)  Pulse:  [60-86] 77  Resp:  [16-20] 16  SpO2:  [93 %-98 %] 94 %  BP: (118-141)/(62-76) 124/62     Date 01/05/22 0700 - 01/06/22 0659   Shift [id number] 24 Hour Total   INTAKE   NG/   303   Shift Total(mL/kg) 303(4.6)   303(4.6)   OUTPUT   Shift Total(mL/kg)       Weight (kg) 66 66 66 66     Lines/Drains/Airways     Drain                 Gastrostomy/Enterostomy 12/27/21 1152 Percutaneous endoscopic gastrostomy (PEG) LUQ 8 days          Airway                 Surgical Airway 12/27/21 1231 Shiley Cuffed 8 days          Peripheral Intravenous Line                 Midline Catheter Insertion/Assessment  - Single Lumen 01/02/22 1410 Right basilic vein (medial side of arm) other (see comments) 2 days                Physical Exam  Awake, alert  EOMI  NCAT  Neck - soft, 6-0 shiley cuffed trach in place.     Significant Labs:  BMP:    Recent Labs   Lab 01/05/22  0503         CO2 23   BUN 34*   CREATININE 0.8   CALCIUM 10.6*   MG 2.0     CBC:   Recent Labs   Lab 01/05/22  0805   WBC 13.72*   RBC 4.16*   HGB 11.8*   HCT 36.4*   *   MCV 88   MCH 28.4   MCHC 32.4       Significant Diagnostics:  I have reviewed and interpreted all pertinent imaging results/findings within the past 24 hours.

## 2022-01-06 ENCOUNTER — ANESTHESIA (OUTPATIENT)
Dept: SURGERY | Facility: HOSPITAL | Age: 72
DRG: 012 | End: 2022-01-06
Payer: MEDICARE

## 2022-01-06 LAB
ALBUMIN SERPL BCP-MCNC: 2.6 G/DL (ref 3.5–5.2)
ALP SERPL-CCNC: 200 U/L (ref 55–135)
ALT SERPL W/O P-5'-P-CCNC: 31 U/L (ref 10–44)
ANION GAP SERPL CALC-SCNC: 9 MMOL/L (ref 8–16)
ANION GAP SERPL CALC-SCNC: 9 MMOL/L (ref 8–16)
APTT BLDCRRT: 28.8 SEC (ref 21–32)
AST SERPL-CCNC: 14 U/L (ref 10–40)
BACTERIA SPEC AEROBE CULT: NORMAL
BACTERIA SPEC AEROBE CULT: NORMAL
BASOPHILS # BLD AUTO: 0.03 K/UL (ref 0–0.2)
BASOPHILS NFR BLD: 0.2 % (ref 0–1.9)
BILIRUB SERPL-MCNC: 0.9 MG/DL (ref 0.1–1)
BUN SERPL-MCNC: 37 MG/DL (ref 8–23)
BUN SERPL-MCNC: 37 MG/DL (ref 8–23)
CA-I BLDV-SCNC: 1.1 MMOL/L (ref 1.06–1.42)
CALCIUM SERPL-MCNC: 10.9 MG/DL (ref 8.7–10.5)
CALCIUM SERPL-MCNC: 7.7 MG/DL (ref 8.7–10.5)
CHLORIDE SERPL-SCNC: 101 MMOL/L (ref 95–110)
CHLORIDE SERPL-SCNC: 102 MMOL/L (ref 95–110)
CO2 SERPL-SCNC: 22 MMOL/L (ref 23–29)
CO2 SERPL-SCNC: 25 MMOL/L (ref 23–29)
CREAT SERPL-MCNC: 0.8 MG/DL (ref 0.5–1.4)
CREAT SERPL-MCNC: 1 MG/DL (ref 0.5–1.4)
DIFFERENTIAL METHOD: ABNORMAL
EOSINOPHIL # BLD AUTO: 0 K/UL (ref 0–0.5)
EOSINOPHIL NFR BLD: 0.1 % (ref 0–8)
ERYTHROCYTE [DISTWIDTH] IN BLOOD BY AUTOMATED COUNT: 13.1 % (ref 11.5–14.5)
ERYTHROCYTE [DISTWIDTH] IN BLOOD BY AUTOMATED COUNT: 13.1 % (ref 11.5–14.5)
EST. GFR  (AFRICAN AMERICAN): >60 ML/MIN/1.73 M^2
EST. GFR  (AFRICAN AMERICAN): >60 ML/MIN/1.73 M^2
EST. GFR  (NON AFRICAN AMERICAN): >60 ML/MIN/1.73 M^2
EST. GFR  (NON AFRICAN AMERICAN): >60 ML/MIN/1.73 M^2
GLUCOSE SERPL-MCNC: 139 MG/DL (ref 70–110)
GLUCOSE SERPL-MCNC: 224 MG/DL (ref 70–110)
GRAM STN SPEC: NORMAL
HCT VFR BLD AUTO: 29.1 % (ref 40–54)
HCT VFR BLD AUTO: 30.3 % (ref 40–54)
HGB BLD-MCNC: 9.4 G/DL (ref 14–18)
HGB BLD-MCNC: 9.6 G/DL (ref 14–18)
IMM GRANULOCYTES # BLD AUTO: 0.29 K/UL (ref 0–0.04)
IMM GRANULOCYTES NFR BLD AUTO: 1.6 % (ref 0–0.5)
INR PPP: 1.1 (ref 0.8–1.2)
LYMPHOCYTES # BLD AUTO: 0.4 K/UL (ref 1–4.8)
LYMPHOCYTES NFR BLD: 1.9 % (ref 18–48)
MAGNESIUM SERPL-MCNC: 2 MG/DL (ref 1.6–2.6)
MAGNESIUM SERPL-MCNC: 2.1 MG/DL (ref 1.6–2.6)
MCH RBC QN AUTO: 27.7 PG (ref 27–31)
MCH RBC QN AUTO: 28.1 PG (ref 27–31)
MCHC RBC AUTO-ENTMCNC: 31.7 G/DL (ref 32–36)
MCHC RBC AUTO-ENTMCNC: 32.3 G/DL (ref 32–36)
MCV RBC AUTO: 87 FL (ref 82–98)
MCV RBC AUTO: 88 FL (ref 82–98)
MONOCYTES # BLD AUTO: 0.6 K/UL (ref 0.3–1)
MONOCYTES NFR BLD: 3 % (ref 4–15)
NEUTROPHILS # BLD AUTO: 17.3 K/UL (ref 1.8–7.7)
NEUTROPHILS NFR BLD: 93.2 % (ref 38–73)
NRBC BLD-RTO: 0 /100 WBC
PHOSPHATE SERPL-MCNC: 3 MG/DL (ref 2.7–4.5)
PHOSPHATE SERPL-MCNC: 4.4 MG/DL (ref 2.7–4.5)
PLATELET # BLD AUTO: 392 K/UL (ref 150–450)
PLATELET # BLD AUTO: 447 K/UL (ref 150–450)
PMV BLD AUTO: 9.6 FL (ref 9.2–12.9)
PMV BLD AUTO: 9.7 FL (ref 9.2–12.9)
POCT GLUCOSE: 161 MG/DL (ref 70–110)
POCT GLUCOSE: 172 MG/DL (ref 70–110)
POCT GLUCOSE: 254 MG/DL (ref 70–110)
POTASSIUM SERPL-SCNC: 4.4 MMOL/L (ref 3.5–5.1)
POTASSIUM SERPL-SCNC: 4.7 MMOL/L (ref 3.5–5.1)
PROT SERPL-MCNC: 6.4 G/DL (ref 6–8.4)
PROTHROMBIN TIME: 11.8 SEC (ref 9–12.5)
PTH-INTACT SERPL-MCNC: 6.9 PG/ML (ref 9–77)
RBC # BLD AUTO: 3.34 M/UL (ref 4.6–6.2)
RBC # BLD AUTO: 3.46 M/UL (ref 4.6–6.2)
SODIUM SERPL-SCNC: 133 MMOL/L (ref 136–145)
SODIUM SERPL-SCNC: 135 MMOL/L (ref 136–145)
WBC # BLD AUTO: 18.55 K/UL (ref 3.9–12.7)
WBC # BLD AUTO: 20.6 K/UL (ref 3.9–12.7)

## 2022-01-06 PROCEDURE — 88305 TISSUE EXAM BY PATHOLOGIST: ICD-10-PCS | Mod: 26,HCNC,, | Performed by: PATHOLOGY

## 2022-01-06 PROCEDURE — 31360 PR REMOVAL OF LARYNX: ICD-10-PCS | Mod: HCNC,,, | Performed by: OTOLARYNGOLOGY

## 2022-01-06 PROCEDURE — 14301 TIS TRNFR ANY 30.1-60 SQ CM: CPT | Mod: 51,HCNC,, | Performed by: OTOLARYNGOLOGY

## 2022-01-06 PROCEDURE — 63600175 PHARM REV CODE 636 W HCPCS: Mod: HCNC | Performed by: STUDENT IN AN ORGANIZED HEALTH CARE EDUCATION/TRAINING PROGRAM

## 2022-01-06 PROCEDURE — 25000242 PHARM REV CODE 250 ALT 637 W/ HCPCS: Mod: HCNC | Performed by: STUDENT IN AN ORGANIZED HEALTH CARE EDUCATION/TRAINING PROGRAM

## 2022-01-06 PROCEDURE — 80048 BASIC METABOLIC PNL TOTAL CA: CPT | Mod: HCNC | Performed by: OTOLARYNGOLOGY

## 2022-01-06 PROCEDURE — C1729 CATH, DRAINAGE: HCPCS | Mod: HCNC | Performed by: OTOLARYNGOLOGY

## 2022-01-06 PROCEDURE — 85730 THROMBOPLASTIN TIME PARTIAL: CPT | Mod: HCNC | Performed by: OTOLARYNGOLOGY

## 2022-01-06 PROCEDURE — 36620 INSERTION CATHETER ARTERY: CPT | Mod: 59,,, | Performed by: ANESTHESIOLOGY

## 2022-01-06 PROCEDURE — 14301 PR ADJ TISS XFER ANY AREA,30.1-60 SQCM: ICD-10-PCS | Mod: 51,HCNC,, | Performed by: OTOLARYNGOLOGY

## 2022-01-06 PROCEDURE — 63600175 PHARM REV CODE 636 W HCPCS: Mod: JG,HCNC | Performed by: OTOLARYNGOLOGY

## 2022-01-06 PROCEDURE — 88331 PATH CONSLTJ SURG 1 BLK 1SPC: CPT | Mod: HCNC | Performed by: PATHOLOGY

## 2022-01-06 PROCEDURE — 31360 REMOVAL OF LARYNX: CPT | Mod: HCNC,,, | Performed by: OTOLARYNGOLOGY

## 2022-01-06 PROCEDURE — 25000003 PHARM REV CODE 250: Mod: HCNC | Performed by: STUDENT IN AN ORGANIZED HEALTH CARE EDUCATION/TRAINING PROGRAM

## 2022-01-06 PROCEDURE — 36000708 HC OR TIME LEV III 1ST 15 MIN: Mod: HCNC | Performed by: OTOLARYNGOLOGY

## 2022-01-06 PROCEDURE — 38724 REMOVAL OF LYMPH NODES NECK: CPT | Mod: 50,59,HCNC, | Performed by: OTOLARYNGOLOGY

## 2022-01-06 PROCEDURE — 85025 COMPLETE CBC W/AUTO DIFF WBC: CPT | Mod: HCNC | Performed by: OTOLARYNGOLOGY

## 2022-01-06 PROCEDURE — 14302 TIS TRNFR ADDL 30 SQ CM: CPT | Mod: HCNC,,, | Performed by: OTOLARYNGOLOGY

## 2022-01-06 PROCEDURE — 88309 TISSUE EXAM BY PATHOLOGIST: CPT | Mod: HCNC | Performed by: PATHOLOGY

## 2022-01-06 PROCEDURE — D9220A PRA ANESTHESIA: Mod: HCNC,,, | Performed by: ANESTHESIOLOGY

## 2022-01-06 PROCEDURE — 15757 PR FREE SKIN FLAP W MICROVASC ANAST: ICD-10-PCS | Mod: HCNC,,, | Performed by: OTOLARYNGOLOGY

## 2022-01-06 PROCEDURE — 82330 ASSAY OF CALCIUM: CPT | Mod: HCNC | Performed by: STUDENT IN AN ORGANIZED HEALTH CARE EDUCATION/TRAINING PROGRAM

## 2022-01-06 PROCEDURE — 83970 ASSAY OF PARATHORMONE: CPT | Mod: HCNC | Performed by: STUDENT IN AN ORGANIZED HEALTH CARE EDUCATION/TRAINING PROGRAM

## 2022-01-06 PROCEDURE — 60512 PR AUTOTRANSPLANT, PARATHYROID: ICD-10-PCS | Mod: HCNC,,, | Performed by: OTOLARYNGOLOGY

## 2022-01-06 PROCEDURE — 85610 PROTHROMBIN TIME: CPT | Mod: HCNC | Performed by: OTOLARYNGOLOGY

## 2022-01-06 PROCEDURE — 94761 N-INVAS EAR/PLS OXIMETRY MLT: CPT | Mod: HCNC

## 2022-01-06 PROCEDURE — 36000709 HC OR TIME LEV III EA ADD 15 MIN: Mod: HCNC | Performed by: OTOLARYNGOLOGY

## 2022-01-06 PROCEDURE — 25000003 PHARM REV CODE 250: Mod: HCNC | Performed by: OTOLARYNGOLOGY

## 2022-01-06 PROCEDURE — 88307 PR  SURG PATH,LEVEL V: ICD-10-PCS | Mod: 26,HCNC,, | Performed by: PATHOLOGY

## 2022-01-06 PROCEDURE — 80053 COMPREHEN METABOLIC PANEL: CPT | Mod: HCNC | Performed by: STUDENT IN AN ORGANIZED HEALTH CARE EDUCATION/TRAINING PROGRAM

## 2022-01-06 PROCEDURE — 85027 COMPLETE CBC AUTOMATED: CPT | Mod: HCNC | Performed by: STUDENT IN AN ORGANIZED HEALTH CARE EDUCATION/TRAINING PROGRAM

## 2022-01-06 PROCEDURE — 36415 COLL VENOUS BLD VENIPUNCTURE: CPT | Mod: HCNC | Performed by: STUDENT IN AN ORGANIZED HEALTH CARE EDUCATION/TRAINING PROGRAM

## 2022-01-06 PROCEDURE — 88305 TISSUE EXAM BY PATHOLOGIST: CPT | Mod: HCNC | Performed by: PATHOLOGY

## 2022-01-06 PROCEDURE — D9220A PRA ANESTHESIA: ICD-10-PCS | Mod: HCNC,,, | Performed by: ANESTHESIOLOGY

## 2022-01-06 PROCEDURE — 60252 REMOVAL OF THYROID: CPT | Mod: 51,HCNC,, | Performed by: OTOLARYNGOLOGY

## 2022-01-06 PROCEDURE — 20000000 HC ICU ROOM: Mod: HCNC

## 2022-01-06 PROCEDURE — 83735 ASSAY OF MAGNESIUM: CPT | Mod: HCNC | Performed by: STUDENT IN AN ORGANIZED HEALTH CARE EDUCATION/TRAINING PROGRAM

## 2022-01-06 PROCEDURE — 27000221 HC OXYGEN, UP TO 24 HOURS: Mod: HCNC

## 2022-01-06 PROCEDURE — 31525 DX LARYNGOSCOPY EXCL NB: CPT | Mod: 59,HCNC,, | Performed by: OTOLARYNGOLOGY

## 2022-01-06 PROCEDURE — 82962 GLUCOSE BLOOD TEST: CPT | Mod: HCNC | Performed by: OTOLARYNGOLOGY

## 2022-01-06 PROCEDURE — 84100 ASSAY OF PHOSPHORUS: CPT | Mod: 91,HCNC | Performed by: OTOLARYNGOLOGY

## 2022-01-06 PROCEDURE — 88307 TISSUE EXAM BY PATHOLOGIST: CPT | Mod: 26,HCNC,, | Performed by: PATHOLOGY

## 2022-01-06 PROCEDURE — 84100 ASSAY OF PHOSPHORUS: CPT | Mod: HCNC | Performed by: STUDENT IN AN ORGANIZED HEALTH CARE EDUCATION/TRAINING PROGRAM

## 2022-01-06 PROCEDURE — 27201423 OPTIME MED/SURG SUP & DEVICES STERILE SUPPLY: Mod: HCNC | Performed by: OTOLARYNGOLOGY

## 2022-01-06 PROCEDURE — 38724 PR REMOVAL NODES, NECK,CERV MOD RAD: ICD-10-PCS | Mod: 50,59,HCNC, | Performed by: OTOLARYNGOLOGY

## 2022-01-06 PROCEDURE — 88309 TISSUE EXAM BY PATHOLOGIST: CPT | Mod: 26,HCNC,, | Performed by: PATHOLOGY

## 2022-01-06 PROCEDURE — 88309 PR  SURG PATH,LEVEL VI: ICD-10-PCS | Mod: 26,HCNC,, | Performed by: PATHOLOGY

## 2022-01-06 PROCEDURE — 88305 TISSUE EXAM BY PATHOLOGIST: CPT | Mod: 26,HCNC,, | Performed by: PATHOLOGY

## 2022-01-06 PROCEDURE — 14302 PR ADJ TISS XFER ANY AREA,EA ADD 30.0 SQCM: ICD-10-PCS | Mod: HCNC,,, | Performed by: OTOLARYNGOLOGY

## 2022-01-06 PROCEDURE — 63600175 PHARM REV CODE 636 W HCPCS: Mod: HCNC | Performed by: OTOLARYNGOLOGY

## 2022-01-06 PROCEDURE — 60252 PR THYROIDECTOMY,MALIG,LTD NECK SURG: ICD-10-PCS | Mod: 51,HCNC,, | Performed by: OTOLARYNGOLOGY

## 2022-01-06 PROCEDURE — 37000008 HC ANESTHESIA 1ST 15 MINUTES: Mod: HCNC | Performed by: OTOLARYNGOLOGY

## 2022-01-06 PROCEDURE — 37000009 HC ANESTHESIA EA ADD 15 MINS: Mod: HCNC | Performed by: OTOLARYNGOLOGY

## 2022-01-06 PROCEDURE — 25000003 PHARM REV CODE 250: Mod: HCNC | Performed by: ANESTHESIOLOGY

## 2022-01-06 PROCEDURE — 60512 AUTOTRANSPLANT PARATHYROID: CPT | Mod: HCNC,,, | Performed by: OTOLARYNGOLOGY

## 2022-01-06 PROCEDURE — 88331 PATH CONSLTJ SURG 1 BLK 1SPC: CPT | Mod: 26,HCNC,, | Performed by: PATHOLOGY

## 2022-01-06 PROCEDURE — 63600175 PHARM REV CODE 636 W HCPCS: Mod: HCNC | Performed by: ANESTHESIOLOGY

## 2022-01-06 PROCEDURE — 99900035 HC TECH TIME PER 15 MIN (STAT): Mod: HCNC

## 2022-01-06 PROCEDURE — 99900026 HC AIRWAY MAINTENANCE (STAT): Mod: HCNC

## 2022-01-06 PROCEDURE — 88331 PR  PATH CONSULT IN SURG,W FRZ SEC: ICD-10-PCS | Mod: 26,HCNC,, | Performed by: PATHOLOGY

## 2022-01-06 PROCEDURE — 31525 PR LARYNGOSCOPY,DIRECT,DIAGNOSTIC: ICD-10-PCS | Mod: 59,HCNC,, | Performed by: OTOLARYNGOLOGY

## 2022-01-06 PROCEDURE — 27800903 OPTIME MED/SURG SUP & DEVICES OTHER IMPLANTS: Mod: HCNC | Performed by: OTOLARYNGOLOGY

## 2022-01-06 PROCEDURE — 15757 FREE SKIN FLAP MICROVASC: CPT | Mod: HCNC,,, | Performed by: OTOLARYNGOLOGY

## 2022-01-06 PROCEDURE — 83735 ASSAY OF MAGNESIUM: CPT | Mod: 91,HCNC | Performed by: OTOLARYNGOLOGY

## 2022-01-06 PROCEDURE — 36620 PR INSERT CATH,ART,PERCUT,SHORTTERM: ICD-10-PCS | Mod: 59,,, | Performed by: ANESTHESIOLOGY

## 2022-01-06 DEVICE — COUPLER MICROVAS ANSTMS 3.0MM: Type: IMPLANTABLE DEVICE | Site: NECK | Status: FUNCTIONAL

## 2022-01-06 RX ORDER — LEVOTHYROXINE SODIUM 112 UG/1
112 TABLET ORAL
Status: DISCONTINUED | OUTPATIENT
Start: 2022-01-07 | End: 2022-01-14 | Stop reason: HOSPADM

## 2022-01-06 RX ORDER — PHENYLEPHRINE HCL IN 0.9% NACL 1 MG/10 ML
SYRINGE (ML) INTRAVENOUS
Status: DISCONTINUED | OUTPATIENT
Start: 2022-01-06 | End: 2022-01-13

## 2022-01-06 RX ORDER — SODIUM,POTASSIUM PHOSPHATES 280-250MG
2 POWDER IN PACKET (EA) ORAL
Status: DISCONTINUED | OUTPATIENT
Start: 2022-01-06 | End: 2022-01-10

## 2022-01-06 RX ORDER — ACETAMINOPHEN 10 MG/ML
INJECTION, SOLUTION INTRAVENOUS
Status: DISCONTINUED | OUTPATIENT
Start: 2022-01-06 | End: 2022-01-13

## 2022-01-06 RX ORDER — DEXMEDETOMIDINE HYDROCHLORIDE 100 UG/ML
INJECTION, SOLUTION INTRAVENOUS
Status: DISCONTINUED | OUTPATIENT
Start: 2022-01-06 | End: 2022-01-13

## 2022-01-06 RX ORDER — LANOLIN ALCOHOL/MO/W.PET/CERES
800 CREAM (GRAM) TOPICAL
Status: DISCONTINUED | OUTPATIENT
Start: 2022-01-06 | End: 2022-01-10

## 2022-01-06 RX ORDER — LIDOCAINE HYDROCHLORIDE 40 MG/ML
INJECTION, SOLUTION RETROBULBAR
Status: DISCONTINUED | OUTPATIENT
Start: 2022-01-06 | End: 2022-01-06 | Stop reason: HOSPADM

## 2022-01-06 RX ORDER — PROPOFOL 10 MG/ML
VIAL (ML) INTRAVENOUS
Status: DISCONTINUED | OUTPATIENT
Start: 2022-01-06 | End: 2022-01-13

## 2022-01-06 RX ORDER — SODIUM CHLORIDE 9 MG/ML
INJECTION, SOLUTION INTRAVENOUS CONTINUOUS
Status: CANCELLED | OUTPATIENT
Start: 2022-01-06

## 2022-01-06 RX ORDER — HYDROMORPHONE HYDROCHLORIDE 1 MG/ML
0.5 INJECTION, SOLUTION INTRAMUSCULAR; INTRAVENOUS; SUBCUTANEOUS EVERY 4 HOURS PRN
Status: CANCELLED | OUTPATIENT
Start: 2022-01-06

## 2022-01-06 RX ORDER — CEFAZOLIN SODIUM 1 G/3ML
INJECTION, POWDER, FOR SOLUTION INTRAMUSCULAR; INTRAVENOUS
Status: DISCONTINUED | OUTPATIENT
Start: 2022-01-06 | End: 2022-01-13

## 2022-01-06 RX ORDER — OXYCODONE HCL 5 MG/5 ML
10 SOLUTION, ORAL ORAL EVERY 6 HOURS PRN
Status: DISCONTINUED | OUTPATIENT
Start: 2022-01-06 | End: 2022-01-10

## 2022-01-06 RX ORDER — HYDROMORPHONE HYDROCHLORIDE 1 MG/ML
0.5 INJECTION, SOLUTION INTRAMUSCULAR; INTRAVENOUS; SUBCUTANEOUS EVERY 4 HOURS PRN
Status: DISCONTINUED | OUTPATIENT
Start: 2022-01-06 | End: 2022-01-10

## 2022-01-06 RX ORDER — EPHEDRINE SULFATE 50 MG/ML
INJECTION, SOLUTION INTRAVENOUS
Status: DISCONTINUED | OUTPATIENT
Start: 2022-01-06 | End: 2022-01-13

## 2022-01-06 RX ORDER — SODIUM CHLORIDE 9 MG/ML
INJECTION, SOLUTION INTRAVENOUS CONTINUOUS
Status: DISCONTINUED | OUTPATIENT
Start: 2022-01-06 | End: 2022-01-07

## 2022-01-06 RX ORDER — HEPARIN SODIUM 1000 [USP'U]/ML
INJECTION, SOLUTION INTRAVENOUS; SUBCUTANEOUS
Status: DISCONTINUED | OUTPATIENT
Start: 2022-01-06 | End: 2022-01-06 | Stop reason: HOSPADM

## 2022-01-06 RX ORDER — INSULIN ASPART 100 [IU]/ML
1-10 INJECTION, SOLUTION INTRAVENOUS; SUBCUTANEOUS EVERY 6 HOURS PRN
Status: DISCONTINUED | OUTPATIENT
Start: 2022-01-06 | End: 2022-01-07

## 2022-01-06 RX ORDER — LIDOCAINE HYDROCHLORIDE AND EPINEPHRINE 10; 10 MG/ML; UG/ML
INJECTION, SOLUTION INFILTRATION; PERINEURAL
Status: DISCONTINUED | OUTPATIENT
Start: 2022-01-06 | End: 2022-01-06 | Stop reason: HOSPADM

## 2022-01-06 RX ORDER — BACITRACIN ZINC 500 UNIT/G
OINTMENT (GRAM) TOPICAL
Status: DISCONTINUED | OUTPATIENT
Start: 2022-01-06 | End: 2022-01-06 | Stop reason: HOSPADM

## 2022-01-06 RX ORDER — ROCURONIUM BROMIDE 10 MG/ML
INJECTION, SOLUTION INTRAVENOUS
Status: DISCONTINUED | OUTPATIENT
Start: 2022-01-06 | End: 2022-01-13

## 2022-01-06 RX ORDER — METHYLENE BLUE 5 MG/ML
INJECTION INTRAVENOUS
Status: DISCONTINUED | OUTPATIENT
Start: 2022-01-06 | End: 2022-01-06 | Stop reason: HOSPADM

## 2022-01-06 RX ORDER — DEXAMETHASONE SODIUM PHOSPHATE 4 MG/ML
INJECTION, SOLUTION INTRA-ARTICULAR; INTRALESIONAL; INTRAMUSCULAR; INTRAVENOUS; SOFT TISSUE
Status: DISCONTINUED | OUTPATIENT
Start: 2022-01-06 | End: 2022-01-13

## 2022-01-06 RX ORDER — KETAMINE HCL IN 0.9 % NACL 50 MG/5 ML
SYRINGE (ML) INTRAVENOUS
Status: DISCONTINUED | OUTPATIENT
Start: 2022-01-06 | End: 2022-01-13

## 2022-01-06 RX ORDER — FENTANYL CITRATE 50 UG/ML
INJECTION, SOLUTION INTRAMUSCULAR; INTRAVENOUS
Status: DISCONTINUED | OUTPATIENT
Start: 2022-01-06 | End: 2022-01-06

## 2022-01-06 RX ORDER — ONDANSETRON 2 MG/ML
8 INJECTION INTRAMUSCULAR; INTRAVENOUS EVERY 6 HOURS PRN
Status: CANCELLED | OUTPATIENT
Start: 2022-01-06

## 2022-01-06 RX ORDER — NEOSTIGMINE METHYLSULFATE 0.5 MG/ML
INJECTION, SOLUTION INTRAVENOUS
Status: DISCONTINUED | OUTPATIENT
Start: 2022-01-06 | End: 2022-01-13

## 2022-01-06 RX ORDER — ONDANSETRON 2 MG/ML
INJECTION INTRAMUSCULAR; INTRAVENOUS
Status: DISCONTINUED | OUTPATIENT
Start: 2022-01-06 | End: 2022-01-13

## 2022-01-06 RX ORDER — SODIUM CHLORIDE 9 MG/ML
INJECTION, SOLUTION INTRAVENOUS CONTINUOUS
Status: DISCONTINUED | OUTPATIENT
Start: 2022-01-06 | End: 2022-01-06

## 2022-01-06 RX ORDER — OXYCODONE HCL 5 MG/5 ML
5 SOLUTION, ORAL ORAL EVERY 6 HOURS PRN
Status: DISCONTINUED | OUTPATIENT
Start: 2022-01-06 | End: 2022-01-14 | Stop reason: HOSPADM

## 2022-01-06 RX ORDER — MORPHINE SULFATE 2 MG/ML
2 INJECTION, SOLUTION INTRAMUSCULAR; INTRAVENOUS EVERY 4 HOURS PRN
Status: CANCELLED | OUTPATIENT
Start: 2022-01-06

## 2022-01-06 RX ADMIN — ROCURONIUM BROMIDE 30 MG: 10 INJECTION, SOLUTION INTRAVENOUS at 10:01

## 2022-01-06 RX ADMIN — SODIUM CHLORIDE, SODIUM GLUCONATE, SODIUM ACETATE, POTASSIUM CHLORIDE, MAGNESIUM CHLORIDE, SODIUM PHOSPHATE, DIBASIC, AND POTASSIUM PHOSPHATE: .53; .5; .37; .037; .03; .012; .00082 INJECTION, SOLUTION INTRAVENOUS at 08:01

## 2022-01-06 RX ADMIN — Medication 100 MCG: at 01:01

## 2022-01-06 RX ADMIN — EPHEDRINE SULFATE 5 MG: 50 INJECTION INTRAVENOUS at 03:01

## 2022-01-06 RX ADMIN — EPHEDRINE SULFATE 5 MG: 50 INJECTION INTRAVENOUS at 04:01

## 2022-01-06 RX ADMIN — SODIUM CHLORIDE, SODIUM GLUCONATE, SODIUM ACETATE, POTASSIUM CHLORIDE, MAGNESIUM CHLORIDE, SODIUM PHOSPHATE, DIBASIC, AND POTASSIUM PHOSPHATE: .53; .5; .37; .037; .03; .012; .00082 INJECTION, SOLUTION INTRAVENOUS at 02:01

## 2022-01-06 RX ADMIN — OXYCODONE HYDROCHLORIDE 5 MG: 5 SOLUTION ORAL at 05:01

## 2022-01-06 RX ADMIN — FAMOTIDINE 20 MG: 20 TABLET ORAL at 08:01

## 2022-01-06 RX ADMIN — INSULIN ASPART 5 UNITS: 100 INJECTION, SOLUTION INTRAVENOUS; SUBCUTANEOUS at 05:01

## 2022-01-06 RX ADMIN — FENTANYL CITRATE 50 MCG: 50 INJECTION INTRAMUSCULAR; INTRAVENOUS at 03:01

## 2022-01-06 RX ADMIN — DILTIAZEM HYDROCHLORIDE 30 MG: 30 TABLET, FILM COATED ORAL at 05:01

## 2022-01-06 RX ADMIN — ROCURONIUM BROMIDE 20 MG: 10 INJECTION, SOLUTION INTRAVENOUS at 09:01

## 2022-01-06 RX ADMIN — PROPOFOL 30 MG: 10 INJECTION, EMULSION INTRAVENOUS at 11:01

## 2022-01-06 RX ADMIN — DEXMEDETOMIDINE HYDROCHLORIDE 10 MCG: 100 INJECTION, SOLUTION INTRAVENOUS at 10:01

## 2022-01-06 RX ADMIN — ONDANSETRON 4 MG: 2 INJECTION INTRAMUSCULAR; INTRAVENOUS at 03:01

## 2022-01-06 RX ADMIN — EPHEDRINE SULFATE 5 MG: 50 INJECTION INTRAVENOUS at 01:01

## 2022-01-06 RX ADMIN — DEXAMETHASONE SODIUM PHOSPHATE 8 MG: 4 INJECTION, SOLUTION INTRAMUSCULAR; INTRAVENOUS at 09:01

## 2022-01-06 RX ADMIN — Medication 30 MG: at 08:01

## 2022-01-06 RX ADMIN — SODIUM CHLORIDE: 0.9 INJECTION, SOLUTION INTRAVENOUS at 08:01

## 2022-01-06 RX ADMIN — FENTANYL CITRATE 50 MCG: 50 INJECTION INTRAMUSCULAR; INTRAVENOUS at 12:01

## 2022-01-06 RX ADMIN — Medication 100 MCG: at 02:01

## 2022-01-06 RX ADMIN — SODIUM CHLORIDE, SODIUM GLUCONATE, SODIUM ACETATE, POTASSIUM CHLORIDE, MAGNESIUM CHLORIDE, SODIUM PHOSPHATE, DIBASIC, AND POTASSIUM PHOSPHATE: .53; .5; .37; .037; .03; .012; .00082 INJECTION, SOLUTION INTRAVENOUS at 11:01

## 2022-01-06 RX ADMIN — EPHEDRINE SULFATE 5 MG: 50 INJECTION INTRAVENOUS at 12:01

## 2022-01-06 RX ADMIN — PROPOFOL 20 MG: 10 INJECTION, EMULSION INTRAVENOUS at 10:01

## 2022-01-06 RX ADMIN — Medication 100 MCG: at 11:01

## 2022-01-06 RX ADMIN — SENNOSIDES AND DOCUSATE SODIUM 1 TABLET: 50; 8.6 TABLET ORAL at 08:01

## 2022-01-06 RX ADMIN — ACETAMINOPHEN 1000 MG: 10 INJECTION, SOLUTION INTRAVENOUS at 10:01

## 2022-01-06 RX ADMIN — SODIUM CHLORIDE: 0.9 INJECTION, SOLUTION INTRAVENOUS at 07:01

## 2022-01-06 RX ADMIN — EPHEDRINE SULFATE 10 MG: 50 INJECTION INTRAVENOUS at 11:01

## 2022-01-06 RX ADMIN — HYDROMORPHONE HYDROCHLORIDE 0.5 MG: 1 INJECTION, SOLUTION INTRAMUSCULAR; INTRAVENOUS; SUBCUTANEOUS at 08:01

## 2022-01-06 RX ADMIN — Medication 50 MCG: at 03:01

## 2022-01-06 RX ADMIN — Medication 300 MCG: at 08:01

## 2022-01-06 RX ADMIN — INSULIN ASPART 2 UNITS: 100 INJECTION, SOLUTION INTRAVENOUS; SUBCUTANEOUS at 10:01

## 2022-01-06 RX ADMIN — Medication 10 MG: at 11:01

## 2022-01-06 RX ADMIN — Medication 200 MCG: at 11:01

## 2022-01-06 RX ADMIN — PROPOFOL 30 MG: 10 INJECTION, EMULSION INTRAVENOUS at 10:01

## 2022-01-06 RX ADMIN — ENOXAPARIN SODIUM 40 MG: 40 INJECTION SUBCUTANEOUS at 05:01

## 2022-01-06 RX ADMIN — Medication 20 MG: at 09:01

## 2022-01-06 RX ADMIN — Medication 20 MG: at 12:01

## 2022-01-06 RX ADMIN — Medication 100 MCG: at 03:01

## 2022-01-06 RX ADMIN — ROCURONIUM BROMIDE 20 MG: 10 INJECTION, SOLUTION INTRAVENOUS at 11:01

## 2022-01-06 RX ADMIN — EPHEDRINE SULFATE 5 MG: 50 INJECTION INTRAVENOUS at 11:01

## 2022-01-06 RX ADMIN — PROPOFOL 50 MG: 10 INJECTION, EMULSION INTRAVENOUS at 08:01

## 2022-01-06 RX ADMIN — CEFAZOLIN 2 G: 330 INJECTION, POWDER, FOR SOLUTION INTRAMUSCULAR; INTRAVENOUS at 08:01

## 2022-01-06 RX ADMIN — DEXMEDETOMIDINE HYDROCHLORIDE 8 MCG: 100 INJECTION, SOLUTION INTRAVENOUS at 03:01

## 2022-01-06 RX ADMIN — ROCURONIUM BROMIDE 80 MG: 10 INJECTION, SOLUTION INTRAVENOUS at 08:01

## 2022-01-06 RX ADMIN — CEFAZOLIN 2 G: 330 INJECTION, POWDER, FOR SOLUTION INTRAMUSCULAR; INTRAVENOUS at 12:01

## 2022-01-06 RX ADMIN — GLYCOPYRROLATE 0.2 MG: 0.2 INJECTION INTRAMUSCULAR; INTRAVENOUS at 04:01

## 2022-01-06 RX ADMIN — Medication 20 MG: at 03:01

## 2022-01-06 RX ADMIN — CEFAZOLIN 2 G: 330 INJECTION, POWDER, FOR SOLUTION INTRAMUSCULAR; INTRAVENOUS at 04:01

## 2022-01-06 RX ADMIN — PROPOFOL 150 MG: 10 INJECTION, EMULSION INTRAVENOUS at 08:01

## 2022-01-06 RX ADMIN — NEOSTIGMINE METHYLSULFATE 3 MG: 0.5 INJECTION INTRAVENOUS at 04:01

## 2022-01-06 RX ADMIN — DEXMEDETOMIDINE HYDROCHLORIDE 16 MCG: 100 INJECTION, SOLUTION INTRAVENOUS at 02:01

## 2022-01-06 RX ADMIN — DEXMEDETOMIDINE HYDROCHLORIDE 12 MCG: 100 INJECTION, SOLUTION INTRAVENOUS at 11:01

## 2022-01-06 RX ADMIN — FENTANYL CITRATE 100 MCG: 50 INJECTION INTRAMUSCULAR; INTRAVENOUS at 08:01

## 2022-01-06 RX ADMIN — DEXMEDETOMIDINE HYDROCHLORIDE 8 MCG: 100 INJECTION, SOLUTION INTRAVENOUS at 12:01

## 2022-01-06 NOTE — PLAN OF CARE
Problem: Adult Inpatient Plan of Care  Goal: Readiness for Transition of Care  Outcome: Ongoing, Progressing     Problem: Fall Injury Risk  Goal: Absence of Fall and Fall-Related Injury  Outcome: Ongoing, Progressing     Problem: Skin Injury Risk Increased  Goal: Skin Health and Integrity  Outcome: Ongoing, Progressing     Problem: Infection  Goal: Absence of Infection Signs and Symptoms  Outcome: Ongoing, Progressing     Problem: Airway Clearance Ineffective  Goal: Effective Airway Clearance  1/6/2022 0503 by Maddie Tyler RN  Outcome: Ongoing, Progressing  1/6/2022 0501 by Maddie Tyler RN  Outcome: Ongoing, Progressing

## 2022-01-06 NOTE — CARE UPDATE
BG goal 140 - 180   Diet NPO  Day of Surgery    BG stable and within goal today.  Endo will continue to follow, and manage glycemic control while inpatient.     - Continue Levemir 10 units q HS  (0.3 u/kg dosing)   -  Novolog 5 units q 4 hrs when tube feeds are started (HOLD if tube feeds are stopped or BG < 100)  - Continue Low Dose Correction Scale  - BG monitoring q 4 hrs while NPO/tube feeds     ** Please call Endocrine for any BG related issues **  ** Please notify Endocrine for any change and/or advance in diet**    Lab Results   Component Value Date    HGBA1C 5.9 (H) 01/05/2022       Discharge Planning:   TBD. Please notify endocrinology prior to discharge.

## 2022-01-06 NOTE — ASSESSMENT & PLAN NOTE
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    S/p DL/trach/PEG on 12/27. Awaiting definitive laryngectomy. Trach stoma placed superiorly through the tumor.     - to OR for TL, BND, Total thyroidectomy, likely ALT free flap today. To ICU post op.     - Keep NPO, mIVF     - Home meds restarted otherwise  - Keep cuffed trach in place leading up to surgery  - Bowel regimen  - Please call or page ENT with any concerns

## 2022-01-06 NOTE — OP NOTE
Pre-operative Diagnosis:   Squamous cell carcinoma larynx  Open wound of the neck     Post-operative Diagnosis:  Same     Procedures Performed:  1. Left anterolateral thigh free flap for closure of total laryngectomy neck skin defect  2. Advancement flap for closure of left thigh donor site advanced area was 25 x 10 cm           Surgeon: Alise Hart MD     Assistant(s):  MD Clay Potts MD      Anesthesia: General Endotracheal     EBL:  200 cc     Specimens:  None from my portion of the procedure     Findings:  Ischemia time was  1 hour and 11 minutes.  The  artery was anastomosed to right transverse cervical artery.  The dominant vein was coupled with 3  to external jugular vein.  Exacter Doppler was placed on  the vein.     13 x 7 cm skin paddle was used to cover neck skin  defect with portion of vastus musculature tacked to the SCM for coverage of the carotid     Indications for Procedure:  Mr. Batres is a 71-year-old gentleman scheduled by Dr. James for total laryngectomy with plan of reconstruction with anterolateral thigh free flap.      Procedure in Detail:  After informed consent had been obtained from patient with risks and benefits reviewed patient was taken back to OR 14. Anesthesia proceeded with general endotracheal anesthesia.  Dr. James proceeded with neck dissection and laryngectomy  portions please see his dictations for detail.     Right thigh had been prepped and draped in usual fashion and I proceeded with marking incision from the anterior superior iliac spine to the lateral patella.  Two skin perforators were dopplered and a 13 x 7 cm skin paddle was fashioned centered around these 2 perforators with relaxing incisions along previously drawn line.  Using Bovie in cutting mode is skin was incised along the anterior portion of the skin paddle and relaxing incisions.  This was deepened with cautery to the subcutaneous fat down to the crural fascia.  Fascia of the  rectus femoris was entered and reflected laterally rectus femoris muscle was reflected medially and vastus lateralis came into view with vascular pedicle.  Doppler confirmation of vascular pedicle going into the  was undertaken at this point.  Once this was verified posterior skin paddle incision was done with cutting mode Bovie and then deepened as well with Bovie down to the fascia angie.  Fascia angie was divided and skin was tacked with 3-0 Vicryl suture to the fascia.  I then proceeded with  Harmonic focus through vastus lateralis muscle taking care to leave cuff around  artery.  This was done with intermittent verification with the Doppler.  The nerve to the vastus was identified and divided and clipped.   artery was then traced medially just proximal to rectus branch.  Deeper vastus branches were divided with several large veins divided with 2-0 ties or medium clips.  Once pedicle with the artery and 2 veins were isolated I turned my attention to the neck for vessel preparation.     Transfer cervical was isolated and traced laterally and found to have good length.  This was placed on Acland clamp and divided distally with good flow from the artery on releasing of the clamp.  External jugular was also ligated as far superiorly as possible to allow length for anastomosis and this was placed on an Acland clamp as well.      At this point at turned back towards the left thigh and the  artery with single dominant large vein were ligated with clips or 2-0 ties.  Flap was brought up to the head neck area.  Flap system was irrigated with heparinized saline.  I turned my attention 1st to inset and vestibuloplasty.     The vascular pedicle was laid in tension-free manner and in proper geometry without twisting.  The proximal end of the skin paddle was then secured with 3-0 Vicryl to the neck skin defect in the submental area.  Distal part of the flap was secured with horizontal  mattress 3-0 Vicryl to posterior part of the tracheal stoma.  Attention was turned to microvascular anastomosis.       Microscope was brought in and attention was turned to microvascular anastomosis.  Transfer cervical artery was further cleaned of adventitia and the  artery as well these were placed on the mounting Acland with background and under high magnification end-to-end anastomosis was done with 9 0 nylon in usual fashion.  The flap vein was measured out to be 3 mm and a 3  was used to couple the external jugular vein to  vein.  Papaverine and lidocaine were used to irrigate the vascular system Acland clamps were removed and normal flow was noted in the artery and the vein.  [employer] internal Doppler was placed on the vein with good signal.     At this portion left thigh was closed with advancement flaps.  Wide undermining was undertaken with Bovie cautery above the crural fascia this was done medially over the rectus and laterally over the vastus lateralis.  Advancement flaps were created with total undermined area of 25 x 10 cm.  Two hundred fifteen round Artemio drains were placed and secured to the skin with silk suture.  The flaps were then closed with deep 3-0 Vicryl suture and staples for the skin.       Attention with this point was turned to closure of the neck. Fifteen Artemio drains were placed bilaterally in the gutter below the sternocleidomastoid and secured to the skin with 2-0 silk suture. remainder of the apron incision was tacked to the flap and the native skin with deep 3-0 Vicryl suture.  Remainder of the stoma was matured with half mattress 3-0 Vicryl suture.  Remainder of the neck and flap was closed with deep 3-0 Vicryl suture and staples for the skin.    Reinforced endotracheal tube was removed and Paul tube was placed.  At this point patient was turned over to Anesthesia and taken to the ICU in stable condition     Complications:  None     Attestation:  I was  present for the entire procedure

## 2022-01-06 NOTE — SUBJECTIVE & OBJECTIVE
Interval History: Ready for surgery.     Medications:  Continuous Infusions:   dextrose 10 % in water (D10W)       Scheduled Meds:   diltiaZEM  30 mg Per G Tube Q6H    enoxaparin  40 mg Subcutaneous Daily    famotidine  20 mg Per G Tube BID    insulin aspart U-100  5 Units Subcutaneous Q24H    insulin aspart U-100  5 Units Subcutaneous Q24H    insulin aspart U-100  5 Units Subcutaneous Q24H    insulin aspart U-100  5 Units Subcutaneous Q24H    insulin aspart U-100  5 Units Subcutaneous Q24H    insulin aspart U-100  5 Units Subcutaneous Q24H    insulin detemir U-100  10 Units Subcutaneous QHS    senna-docusate 8.6-50 mg  1 tablet Per G Tube BID     PRN Meds:acetaminophen, dextrose 10 % in water (D10W), dextrose 50%, glucagon (human recombinant), insulin aspart U-100, oxyCODONE, sodium chloride 0.9%     Review of patient's allergies indicates:   Allergen Reactions    Pollen extracts     Lovastatin Rash     Not confirmed but pt skeptical     Objective:     Vital Signs (24h Range):  Temp:  [98.4 °F (36.9 °C)-98.8 °F (37.1 °C)] 98.6 °F (37 °C)  Pulse:  [71-82] 73  Resp:  [16-18] 18  SpO2:  [94 %-97 %] 96 %  BP: (122-129)/(62-73) 129/73       Lines/Drains/Airways     Drain                 Gastrostomy/Enterostomy 12/27/21 1152 Percutaneous endoscopic gastrostomy (PEG) LUQ 9 days          Airway                 Surgical Airway 12/27/21 1231 Shiley Cuffed 9 days          Peripheral Intravenous Line                 Midline Catheter Insertion/Assessment  - Single Lumen 01/02/22 1410 Right basilic vein (medial side of arm) other (see comments) 3 days                Physical Exam  Awake, alert  EOMI  NCAT  Neck - soft, 6-0 shiley cuffed trach in place.     Significant Labs:  BMP:   Recent Labs   Lab 01/06/22  0311   *      CO2 25   BUN 37*   CREATININE 0.8   CALCIUM 10.9*   MG 2.1     CBC:   Recent Labs   Lab 01/05/22  1953   WBC 13.31*   RBC 4.14*   HGB 11.7*   HCT 35.6*   *   MCV 86   MCH 28.3    [facility] 32.9       Significant Diagnostics:  I have reviewed and interpreted all pertinent imaging results/findings within the past 24 hours.

## 2022-01-06 NOTE — NURSING
Patient's tube feeding stopped at midnight. Patient has FSG of 220. He refused insulin at this time stating that he had just received 2 insulins earlier.

## 2022-01-06 NOTE — BRIEF OP NOTE
[facility] - Surgical Intensive Care  Brief Operative Note    SUMMARY     Surgery Date: 1/6/2022     Surgeon(s) and Role:  Panel 1:     * Jesse James MD - Primary     * Yo Herrera MD - Resident - Assisting  Panel 2:     * Alise Hart MD - Primary        Pre-op Diagnosis:  Larynx neoplasm malignant [C32.9]    Post-op Diagnosis:  Post-Op Diagnosis Codes:     * Larynx neoplasm malignant [C32.9]    Procedure(s) (LRB):  LARYNGECTOMY (N/A)  DISSECTION, NECK (Bilateral)  CREATION, FREE FLAP (Right)  THYROIDECTOMY  LARYNGOSCOPY (N/A)  REIMPLANTATION, PARATHYROID TISSUE (N/A)    Anesthesia: General    Operative Findings: laryngeal/subglottic tumor. ALTFF for skin from right leg. Total laryngectomy patient.     Estimated Blood Loss: * No values recorded between 1/6/2022  9:15 AM and 1/6/2022  4:25 PM *    Estimated Blood Loss has not been documented. EBL = 200cc.         Specimens:   Specimen (24h ago, onward)             Start     Ordered    01/06/22 1558  Specimen to Pathology, Surgery ENT  Once        Comments: Pre-op Diagnosis: Larynx neoplasm malignant [C32.9]    Procedure(s):  LARYNGECTOMY  DISSECTION, NECK  CREATION, FREE FLAP  THYROIDECTOMY  LARYNGOSCOPY  REIMPLANTATION, PARATHYROID TISSUE     Number of specimens: 8    Name of specimens:   1. Left neck dissection levels 2, 3, and 4 - Permanent  2. Right neck dissection levels 2, 3, and 4 - Permanent  3. Rule out parathyroid - Frozen  4. Total laryngectomy, total thyroidectomy, bilateral level 6 neck dissection - Permanent  5. Posterior tracheal margin - Frozen  6. Anterior tracheal margin - Frozen  7. Posterior tracheal margin #2 - Frozen  8. Anterior tracheal margin #2 - Frozen   References:    Click here for ordering Quick Tip   Question Answer Comment   Procedure Type: ENT    Specimen Class: Known or suspected malignancy    Release to patient Immediate        01/06/22 1556                [id number]           FOR ANY FLAP CONCERNS PLEASE NOTIFY ENT  ON CALL  Q1H flap checks x 48 hrs  Strict NPO  No neck ties  PATIENT IS AN OBLIGATE NECK BREATHER. DO NOT ATTEMPT TO ORALLY INTUBATE. If ventilatory support is needed, a 7.0 ETT can be easily inserted into the neck stoma.

## 2022-01-06 NOTE — ANESTHESIA PROCEDURE NOTES
Intubation  Performed by: Antony Dsouza MD  Authorized by: David Cade MD     Intubation:     Induction:  Intravenous    Intubated:  Postinduction    Mask Ventilation:  N/a    Attempts:  1    Attempted By:  Resident anesthesiologist    Difficult Airway Encountered?: No      Complications:  None    Airway Device:  Coil wire tube    Airway Device Size:  7.0    Secured at:  The skin level of trach    Placement Verified By:  Capnometry    Complicating Factors:  None    Findings Post-Intubation:  BS equal bilateral

## 2022-01-06 NOTE — H&P
[facility] - Surgical Intensive Care  Critical Care - Surgery  History & Physical    Patient Name: Cyrus Batres Jr.  MRN: [id number]  Admission Date: 12/25/2021  Code Status: Full Code  Attending Physician: Jesse James MD   Primary Care Provider: Diana Calhoun MD   Principal Problem: Larynx neoplasm malignant    Subjective:     HPI:  Cyrus Batres Jr.  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  Patient was recently admitted to the SICU and is s/p trach/PEG on 12/27.  Patient re-presented to the ED with hemoptysis.  Patient was evaluated by ENT and was scheduled for TL, BND, Total thyroidectomy, and ALT free flap. Postoperatively, the patient is admitted to the SICU for further care.  He received 3L of crystalloid intraop as well as precedex, fentanyl and ketamine for pain.  He is HDS on no pressor support.  He is breathing spontaneously on trach collar.               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       Past Surgical History:   Procedure Laterality Date    DIRECT LARYNGOBRONCHOSCOPY N/A 12/27/2021    Procedure: LARYNGOSCOPY, DIRECT, WITH BRONCHOSCOPY;  Surgeon: Flex Espinosa MD;  Location: [facility] OR [address] [place] [address];  Service: ENT;  Laterality: N/A;    LARYNGOSCOPY N/A 8/4/2020    Procedure: Suspension microlaryngoscopy with biopsy, possible KTP laser treatment/excision;  Surgeon: Stew Noel MD;  Location: [facility] OR [address] [place] [address];  Service: ENT;  Laterality: N/A;   Microscope, telescopes, tower, microinstruments, KTP laser, rep conf# [id number] IC 7/28.    LARYNGOSCOPY N/A 3/16/2021    Procedure: Suspension microlaryngoscopy with excision of lesion, possible CO2 laser;  Surgeon: Stew Noel MD;  Location: [address];  Service: ENT;  Laterality: N/A;  Microscope, telescopes, tower, microinstruments, CO2 laser, rep conf# [id number] IC 3/4.    LARYNGOSCOPY N/A 4/1/2021    Procedure: Suspension microlaryngoscopy with KTP laser excision of lesion;  Surgeon: Stew Noel MD;  Location: [facility] OR [address] [place] [address];  Service: ENT;  Laterality: N/A;  Microscope, telescopes, tower, microinstruments, 70 degree scope, vocal fold , KTP laser, rep conf# [id number] BC    LARYNGOSCOPY N/A 12/9/2021    Procedure: Suspension microlaryngoscopy with biopsy;  Surgeon: Stew Noel MD;  Location: [address];  Service: ENT;  Laterality: N/A;  Microscope, telescopes, tower, microinstruments    MICROLARYNGOSCOPY N/A 3/17/2020    Procedure: MICROLARYNGOSCOPY;  Surgeon: Jung Xiao MD;  Location: [facility];  Service: ENT;  Laterality: N/A;  Laser Microlaryngoscopy  NEED TO SCHEDULE LASER from [facility] [id number]    TRACHEOSTOMY N/A 12/27/2021    Procedure: CREATION, TRACHEOSTOMY;  Surgeon: Flex Espinosa MD;  Location: [address];  Service: ENT;  Laterality: N/A;       Review of patient's allergies indicates:   Allergen Reactions    Pollen extracts     Lovastatin Rash     Not confirmed but pt skeptical       Family History     Problem Relation (Age of Onset)    Abnormal EKG Mother    Diabetes Father    Heart disease Father    Hypertension Father        Tobacco Use    Smoking status: Never Smoker    Smokeless tobacco: Never Used   Substance and Sexual Activity    Alcohol use: Not Currently     Comment: occasional    Drug use: No    Sexual activity: Yes     Partners: Female      Review of Systems   Constitutional: Negative for chills and fever.   HENT:  Positive for sore throat.    Eyes: Negative.    Respiratory: Negative.    Cardiovascular: Negative.    Gastrointestinal: Negative.    Endocrine: Negative.    Genitourinary: Negative.    Musculoskeletal: Negative.    Skin: Negative.    Allergic/Immunologic: Negative.    Neurological: Negative.    Psychiatric/Behavioral: Negative.      Objective:     Vital Signs (Most Recent):  Temp: 98.1 °F (36.7 °C) (01/06/22 0805)  Pulse: 76 (01/06/22 1700)  Resp: 18 (01/06/22 1700)  BP: 129/71 (01/06/22 1700)  SpO2: 99 % (01/06/22 1700) Vital Signs (24h Range):  Temp:  [98.1 °F (36.7 °C)-98.8 °F (37.1 °C)] 98.1 °F (36.7 °C)  Pulse:  [71-80] 76  Resp:  [16-21] 18  SpO2:  [96 %-99 %] 99 %  BP: (120-129)/(65-73) 129/71     Weight: 66 kg (145 lb 8.1 oz)  Body mass index is 23.48 kg/m².      Intake/Output Summary (Last 24 hours) at 1/6/2022 1709  Last data filed at 1/6/2022 1646  Gross per 24 hour   Intake 3450 ml   Output 765 ml   Net 2685 ml       Physical Exam  Constitutional:       General: He is not in acute distress.     Comments: Still sedated post-operatively   HENT:      Head: Normocephalic.      Nose: Nose normal.      Mouth/Throat:      Mouth: Mucous membranes are dry.   Eyes:      Extraocular Movements: Extraocular movements intact.      Pupils: Pupils are equal, round, and reactive to light.   Neck:      Comments: Neck drains with light bloody output  Free flap with good pulsation on doppler.   Cardiovascular:      Rate and Rhythm: Normal rate and regular rhythm.      Pulses: Normal pulses.      Heart sounds: Normal heart sounds.   Pulmonary:      Effort: Pulmonary effort is normal. No respiratory distress.      Breath sounds: Normal breath sounds.      Comments: Paul tube with trach collar   Abdominal:      General: Abdomen is flat. Bowel sounds are normal. There is no distension.      Palpations: Abdomen is soft.   Musculoskeletal:      Comments: RLE excision for flap with 1x drain in place   Skin:     General: Skin is  warm and dry.   Neurological:      General: No focal deficit present.      Mental Status: He is alert.   Psychiatric:         Mood and Affect: Mood normal.         Vents:  Oxygen Concentration (%): 35 (01/06/22 1644)    Lines/Drains/Airways     Drain                 Gastrostomy/Enterostomy 12/27/21 1152 Percutaneous endoscopic gastrostomy (PEG) LUQ 10 days         Closed/Suction Drain 01/06/22 1521 Right Neck Bulb 15 Fr. <1 day         Closed/Suction Drain 01/06/22 1524 Left Neck Bulb 15 Fr. <1 day         Closed/Suction Drain 01/06/22 Right Thigh Bulb 15 Fr. <1 day         Closed/Suction Drain 01/06/22 Right;Anterior Thigh Bulb 15 Fr. <1 day         Urethral Catheter 01/06/22 0905 Non-latex 16 Fr. <1 day          Airway                 Laryngectomy (Do Not Remove) 01/06/22 1655 Laryngectomy tube <1 day          Arterial Line            Arterial Line 01/06/22 0840 Left Radial <1 day          Peripheral Intravenous Line                 Midline Catheter Insertion/Assessment  - Single Lumen 01/02/22 1410 Right basilic vein (medial side of arm) other (see comments) 4 days         Peripheral IV - Single Lumen 01/06/22 0853 18 G Right Hand <1 day                Significant Labs:    CBC/Anemia Profile:  Recent Labs   Lab 01/04/22  2017 01/05/22  0805 01/05/22  1953   WBC 13.48* 13.72* 13.31*   HGB 11.5* 11.8* 11.7*   HCT 35.3* 36.4* 35.6*   * 463* 491*   MCV 85 88 86   RDW 13.3 13.3 13.2        Chemistries:  Recent Labs   Lab 01/05/22  0503 01/06/22  0311   * 135*   K 4.4 4.4    101   CO2 23 25   BUN 34* 37*   CREATININE 0.8 0.8   CALCIUM 10.6* 10.9*   ALBUMIN 2.8* 2.6*   PROT 6.0 6.4   BILITOT 1.1* 0.9   ALKPHOS 242* 200*   ALT 41 31   AST 13 14   MG 2.0 2.1   PHOS 3.7 3.0       All pertinent labs within the past 24 hours have been reviewed.    Significant Imaging: I have reviewed all pertinent imaging results/findings within the past 24 hours.    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  Patient is admitted HDS and on trach collar.       Neuro/Psych:   -- RASS , CAM-ICU  -- PRN tylenol and oxycodone 5mg solution       Cardiovascular:   #Atrial fibrillation   -- Continue home Diltiazem 30mg q6h   -- HOLD home eliquis for at least 2 days per ENT    -- Hemodynamics:  -- MAP goal >65  -- continue A line  -- Currently HDS on no pressor support       Pulmonary:   #S/p Laryngectomy and total neck dissection on 1/6/22  -- S/p trach on 12/27/21  -- wean trach collar as tolerated     Renal   -- Cr (Baseline): 0.7  -- BUN/Cr 37  BUN   Date Value Ref Range Status   01/06/2022 37 (H) 8 - 23 mg/dL Final   01/05/2022 34 (H) 8 - 23 mg/dL Final     Creatinine   Date Value Ref Range Status   01/06/2022 0.8 0.5 - 1.4 mg/dL Final   01/05/2022 0.8 0.5 - 1.4 mg/dL Final     -- follow UOP  -- continue mahan     FEN / GI:   -- Strict NPO for now  -- HOLD TFs per ENT  -- Ok to use G tube for medication use     -- Electrolytes:   Recent Labs   Lab 01/06/22  0311   *   K 4.4      CO2 25   BUN 37*   CREATININE 0.8   MG 2.1      -- Nutrition:   -- replace electrolytes as needed to keep K>4, Mg>2  -- bowel reg - daily miralax, docusate  -- famotidine for GI prophylaxis     ID:   -- Tm: afebrile  -- WBC , trend daily     Heme/Onc:   -- H/H stable  -- past products received: none  Lab Results   Component Value Date    WBC 13.31 (H) 01/05/2022    HGB 11.7 (L) 01/05/2022    HCT 35.6 (L) 01/05/2022    MCV 86 01/05/2022     (H) 01/05/2022        Endo:   #S/p thyroidectomy   -- Trend ionized Ca2+ q8h  -- f/u PTH   -- continue levothyroxine    #DM2  -- endocrine following, appreciate recommendations  -- continue 10u determire qHs  -- continue MDSSI      PPx:   Feeding: Regular diet / NPO for now. Hold TFs per  ENT  Analgesia/Sedation: none  Thromboembolic prevention:LVX / SCD  HOB 30  Stress Ulcer ppx: famotidine  Glucose control: Critical care goal 140-180 g/dl  Turn q2 hrs     Dispo/Code Status/Palliative:   -- continue care in the SICU    Anup Jose MD  [facility]         Critical care was time spent personally by me on the following activities: development of treatment plan with patient or surrogate and bedside caregivers, discussions with consultants, evaluation of patient's response to treatment, examination of patient, ordering and performing treatments and interventions, ordering and review of laboratory studies, ordering and review of radiographic studies, pulse oximetry, re-evaluation of patient's condition.  This critical care time did not overlap with that of any other provider or involve time for any procedures.     Anup Jose MD  Critical Care - Surgery  [facility] - [facility] Int[facility]

## 2022-01-06 NOTE — SUBJECTIVE & OBJECTIVE
Follow-up For: Procedure(s) (LRB):  LARYNGECTOMY (N/A)  DISSECTION, NECK (Bilateral)  CREATION, FREE FLAP (Right)  THYROIDECTOMY  LARYNGOSCOPY (N/A)  REIMPLANTATION, PARATHYROID TISSUE (N/A)    Post-Operative Day: Day of Surgery     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       Past Surgical History:   Procedure Laterality Date    DIRECT LARYNGOBRONCHOSCOPY N/A 12/27/2021    Procedure: LARYNGOSCOPY, DIRECT, WITH BRONCHOSCOPY;  Surgeon: Flex Espinosa MD;  Location: [facility] OR [address] [place] [address];  Service: ENT;  Laterality: N/A;    LARYNGOSCOPY N/A 8/4/2020    Procedure: Suspension microlaryngoscopy with biopsy, possible KTP laser treatment/excision;  Surgeon: Stew Noel MD;  Location: [facility] OR [address] [place] [address];  Service: ENT;  Laterality: N/A;  Microscope, telescopes, tower, microinstruments, KTP laser, rep conf# [id number] IC 7/28.    LARYNGOSCOPY N/A 3/16/2021    Procedure: Suspension microlaryngoscopy with excision of lesion, possible CO2 laser;  Surgeon: Stew Noel MD;  Location: [facility] OR [address] [place] [address];  Service: ENT;  Laterality: N/A;  Microscope, telescopes, tower, microinstruments, CO2 laser, rep conf# [id number] IC 3/4.    LARYNGOSCOPY N/A 4/1/2021    Procedure: Suspension microlaryngoscopy with KTP laser excision of lesion;  Surgeon: Stew Noel MD;  [facility];  Service: ENT;  Laterality: N/A;  Microscope, telescopes, tower, microinstruments, 70 degree scope, vocal fold , KTP laser, rep conf# [id number] BC    LARYNGOSCOPY N/A 12/9/2021    Procedure: Suspension microlaryngoscopy with biopsy;  Surgeon: Stew oNel MD;  [facility];  Service: ENT;  Laterality: N/A;  Microscope, telescopes, tower, microinstruments    MICROLARYNGOSCOPY N/A 3/17/2020    Procedure: MICROLARYNGOSCOPY;  Surgeon: Jung NAVA  MD Dameon;  [facility] OR;  Service: ENT;  Laterality: N/A;  Laser Microlaryngoscopy  NEED TO SCHEDULE LASER from [facility] [id number]    TRACHEOSTOMY N/A 12/27/2021    Procedure: CREATION, TRACHEOSTOMY;  Surgeon: Flex Espinosa MD;  [facility];  Service: ENT;  Laterality: N/A;       Review of patient's allergies indicates:   Allergen Reactions    Pollen extracts     Lovastatin Rash     Not confirmed but pt skeptical       Family History     Problem Relation (Age of Onset)    Abnormal EKG Mother    Diabetes Father    Heart disease Father    Hypertension Father        Tobacco Use    Smoking status: Never Smoker    Smokeless tobacco: Never Used   Substance and Sexual Activity    Alcohol use: Not Currently     Comment: occasional    Drug use: No    Sexual activity: Yes     Partners: Female      Review of Systems   Constitutional: Negative for chills and fever.   HENT: Positive for sore throat.    Eyes: Negative.    Respiratory: Negative.    Cardiovascular: Negative.    Gastrointestinal: Negative.    Endocrine: Negative.    Genitourinary: Negative.    Musculoskeletal: Negative.    Skin: Negative.    Allergic/Immunologic: Negative.    Neurological: Negative.    Psychiatric/Behavioral: Negative.      Objective:     Vital Signs (Most Recent):  Temp: 98.1 °F (36.7 °C) (01/06/22 0805)  Pulse: 76 (01/06/22 1700)  Resp: 18 (01/06/22 1700)  BP: 129/71 (01/06/22 1700)  SpO2: 99 % (01/06/22 1700) Vital Signs (24h Range):  Temp:  [98.1 °F (36.7 °C)-98.8 °F (37.1 °C)] 98.1 °F (36.7 °C)  Pulse:  [71-80] 76  Resp:  [16-21] 18  SpO2:  [96 %-99 %] 99 %  BP: (120-129)/(65-73) 129/71     Weight: 66 kg (145 lb 8.1 oz)  Body mass index is 23.48 kg/m².      Intake/Output Summary (Last 24 hours) at 1/6/2022 1709  Last data filed at 1/6/2022 1646  Gross per 24 hour   Intake 3450 ml   Output 765 ml   Net 2685 ml       Physical Exam  Constitutional:       General: He is not in acute distress.     Comments: Still  sedated post-operatively   HENT:      Head: Normocephalic.      Nose: Nose normal.      Mouth/Throat:      Mouth: Mucous membranes are dry.   Eyes:      Extraocular Movements: Extraocular movements intact.      Pupils: Pupils are equal, round, and reactive to light.   Neck:      Comments: Neck drains with light bloody output  Free flap with good pulsation on doppler.   Cardiovascular:      Rate and Rhythm: Normal rate and regular rhythm.      Pulses: Normal pulses.      Heart sounds: Normal heart sounds.   Pulmonary:      Effort: Pulmonary effort is normal. No respiratory distress.      Breath sounds: Normal breath sounds.      Comments: Paul tube with trach collar   Abdominal:      General: Abdomen is flat. Bowel sounds are normal. There is no distension.      Palpations: Abdomen is soft.   Musculoskeletal:      Comments: RLE excision for flap with 1x drain in place   Skin:     General: Skin is warm and dry.   Neurological:      General: No focal deficit present.      Mental Status: He is alert.   Psychiatric:         Mood and Affect: Mood normal.         Vents:  Oxygen Concentration (%): 35 (01/06/22 1644)    Lines/Drains/Airways     Drain                 Gastrostomy/Enterostomy 12/27/21 1152 Percutaneous endoscopic gastrostomy (PEG) LUQ 10 days         Closed/Suction Drain 01/06/22 1521 Right Neck Bulb 15 Fr. <1 day         Closed/Suction Drain 01/06/22 1524 Left Neck Bulb 15 Fr. <1 day         Closed/Suction Drain 01/06/22 Right Thigh Bulb 15 Fr. <1 day         Closed/Suction Drain 01/06/22 Right;Anterior Thigh Bulb 15 Fr. <1 day         Urethral Catheter 01/06/22 0905 Non-latex 16 Fr. <1 day          Airway                 Laryngectomy (Do Not Remove) 01/06/22 1655 Laryngectomy tube <1 day          Arterial Line            Arterial Line 01/06/22 0840 Left Radial <1 day          Peripheral Intravenous Line                 Midline Catheter Insertion/Assessment  - Single Lumen 01/02/22 1410 Right basilic vein  (medial side of arm) other (see comments) 4 days         Peripheral IV - Single Lumen 01/06/22 0853 18 G Right Hand <1 day                Significant Labs:    CBC/Anemia Profile:  Recent Labs   Lab 01/04/22 2017 01/05/22  0805 01/05/22  1953   WBC 13.48* 13.72* 13.31*   HGB 11.5* 11.8* 11.7*   HCT 35.3* 36.4* 35.6*   * 463* 491*   MCV 85 88 86   RDW 13.3 13.3 13.2        Chemistries:  Recent Labs   Lab 01/05/22  0503 01/06/22  0311   * 135*   K 4.4 4.4    101   CO2 23 25   BUN 34* 37*   CREATININE 0.8 0.8   CALCIUM 10.6* 10.9*   ALBUMIN 2.8* 2.6*   PROT 6.0 6.4   BILITOT 1.1* 0.9   ALKPHOS 242* 200*   ALT 41 31   AST 13 14   MG 2.0 2.1   PHOS 3.7 3.0       All pertinent labs within the past 24 hours have been reviewed.    Significant Imaging: I have reviewed all pertinent imaging results/findings within the past 24 hours.

## 2022-01-06 NOTE — RESPIRATORY THERAPY
[facility]APID RESPONSE RESPIRATORY THERAPY PROACTIVE NOTE           Time of visit: 838     Code Status: Full Code   : 1950  Bed: [address] Pool*:   MRN: [id number]  Time spent at the bedside: < 15 min    SITUATION    Evaluated patient for: LDA Check     BACKGROUND    Patient has a past medical history of Allergy, Atrial fibrillation, Chronic anticoagulation, Diabetes mellitus, type 2, Hypertension, and Larynx neoplasm malignant.  Clinically Significant Surgical Hx: tracheostomy    24 Hours Vitals Range:  Temp:  [98.1 °F (36.7 °C)-98.8 °F (37.1 °C)]   Pulse:  [71-82]   Resp:  [16-18]   BP: (120-129)/(63-73)   SpO2:  [94 %-99 %]     Labs:    Recent Labs     22  1036 22  0503 22  0311   * 135* 135*   K 4.6 4.4 4.4    101 101   CO2 27 23 25   CREATININE 0.8 0.8 0.8   * 103 139*   PHOS 3.0 3.7 3.0   MG 2.0 2.0 2.1        No results for input(s): PH, PCO2, PO2, HCO3, POCSATURATED, BE in the last 72 hours.    ASSESSMENT/INTERVENTIONS    Upon arrival in room patient in surgery.  All supplies visible at bedside.  Will follow up.    Last VS   Temp: 98.1 °F (36.7 °C) (805)  Pulse: 71 (805)  Resp: 16 (805)  BP: 120/69 (805)  SpO2: 99 % (805)    Level of Consciousness: Level of Consciousness (AVPU): alert  Respiratory Effort: Respiratory Effort: Normal,Unlabored Expansion/Accessory Muscle Usage: Expansion/Accessory Muscles/Retractions: no use of accessory muscles,expansion symmetric  All Lung Field Breath Sounds: All Lung Fields Breath Sounds: Anterior:,Posterior:,coarse,equal bilaterally  EVER Breath Sounds: rhonchi  LLL Breath Sounds: crackles, fine  RUL Breath Sounds: rhonchi  RML Breath Sounds: clear  RLL Breath Sounds: crackles, fine  O2 Device/Concentration: Flow (L/min): 8, Oxygen Concentration (%): 35, O2 Device (Oxygen Therapy): Trach Collar  Surgical airway: Yes, Type: Shiley Size: 6   Ambu at bedside: Ambu bag with the patient?: Yes, Adult  Ambu     Active Orders   Respiratory Care    Oxygen Continuous     Frequency: Continuous     Number of Occurrences: Until Specified     Order Questions:      Device type: Low flow      Device: Trach Collar      Titrate O2 per Oxygen Titration Protocol: Yes      To maintain SpO2 goal of: >= 90%      Notify MD of: Inability to achieve desired SpO2; Sudden change in patient status and requires 20% increase in FiO2; Patient requires >60% FiO2    Pulse Oximetry Continuous     Frequency: Continuous     Number of Occurrences: Until Specified    Routine tracheostomy care     Frequency: BID     Number of Occurrences: Until Specified    Sputum Induction Once     Frequency: Once     Number of Occurrences: 1 Occurrences       RECOMMENDATIONS    We recommend: RRT Recs: Continue POC per primary team.    ESCALATION      FOLLOW-UP    Please call back the Rapid Response RT, Yumiko Velásquez RRT at x [phone] for any questions or concerns.

## 2022-01-06 NOTE — PROGRESS NOTES
[facility] - Surgery (2nd Fl)  Otorhinolaryngology-Head & Neck Surgery  Progress Note    Subjective:     Post-Op Info:  Procedure(s) (LRB):  LARYNGECTOMY (N/A)  DISSECTION, NECK (Bilateral)  CREATION, FLAP, MUSCLE ROTATION (N/A)   Day of Surgery  Hospital Day: 13     Interval History: Ready for surgery.     Medications:  Continuous Infusions:   dextrose 10 % in water (D10W)       Scheduled Meds:   diltiaZEM  30 mg Per G Tube Q6H    enoxaparin  40 mg Subcutaneous Daily    famotidine  20 mg Per G Tube BID    insulin aspart U-100  5 Units Subcutaneous Q24H    insulin aspart U-100  5 Units Subcutaneous Q24H    insulin aspart U-100  5 Units Subcutaneous Q24H    insulin aspart U-100  5 Units Subcutaneous Q24H    insulin aspart U-100  5 Units Subcutaneous Q24H    insulin aspart U-100  5 Units Subcutaneous Q24H    insulin detemir U-100  10 Units Subcutaneous QHS    senna-docusate 8.6-50 mg  1 tablet Per G Tube BID     PRN Meds:acetaminophen, dextrose 10 % in water (D10W), dextrose 50%, glucagon (human recombinant), insulin aspart U-100, oxyCODONE, sodium chloride 0.9%     Review of patient's allergies indicates:   Allergen Reactions    Pollen extracts     Lovastatin Rash     Not confirmed but pt skeptical     Objective:     Vital Signs (24h Range):  Temp:  [98.4 °F (36.9 °C)-98.8 °F (37.1 °C)] 98.6 °F (37 °C)  Pulse:  [71-82] 73  Resp:  [16-18] 18  SpO2:  [94 %-97 %] 96 %  BP: (122-129)/(62-73) 129/73       Lines/Drains/Airways     Drain                 Gastrostomy/Enterostomy 12/27/21 1152 Percutaneous endoscopic gastrostomy (PEG) LUQ 9 days          Airway                 Surgical Airway 12/27/21 1231 Shiley Cuffed 9 days          Peripheral Intravenous Line                 Midline Catheter Insertion/Assessment  - Single Lumen 01/02/22 1410 Right basilic vein (medial side of arm) other (see comments) 3 days                Physical Exam  Awake, alert  EOMI  NCAT  Neck - soft, 6-0 shiley cuffed trach in place.      Significant Labs:  BMP:   Recent Labs   Lab 01/06/22  0311   *      CO2 25   BUN 37*   CREATININE 0.8   CALCIUM 10.9*   MG 2.1     CBC:   Recent Labs   Lab 01/05/22 1953   WBC 13.31*   RBC 4.14*   HGB 11.7*   HCT 35.6*   *   MCV 86   MCH 28.3   MCHC 32.9       Significant Diagnostics:  I have reviewed and interpreted all pertinent imaging results/findings within the past 24 hours.    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    S/p DL/trach/PEG on 12/27. Awaiting definitive laryngectomy. Trach stoma placed superiorly through the tumor.     - to OR for TL, BND, Total thyroidectomy, likely ALT free flap today. To ICU post op.     - Keep NPO, mIVF     - Home meds restarted otherwise  - Keep cuffed trach in place leading up to surgery  - Bowel regimen  - Please call or page ENT with any concerns        Yo Herrera MD  Otorhinolaryngology-Head & Neck Surgery  [facility] - Surgery (2nd Fl)

## 2022-01-06 NOTE — ASSESSMENT & PLAN NOTE
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  Patient is admitted HDS and on trach collar.       Neuro/Psych:   -- RASS , CAM-ICU  -- PRN tylenol and oxycodone 5mg solution       Cardiovascular:   #Atrial fibrillation   -- Continue home Diltiazem 30mg q6h   -- HOLD home eliquis for at least 2 days per ENT    -- Hemodynamics:  -- MAP goal >65  -- continue A line  -- Currently HDS on no pressor support       Pulmonary:   #S/p Laryngectomy and total neck dissection on 1/6/22  -- S/p trach on 12/27/21  -- wean trach collar as tolerated     Renal   -- Cr (Baseline): 0.7  -- BUN/Cr 37  BUN   Date Value Ref Range Status   01/06/2022 37 (H) 8 - 23 mg/dL Final   01/05/2022 34 (H) 8 - 23 mg/dL Final     Creatinine   Date Value Ref Range Status   01/06/2022 0.8 0.5 - 1.4 mg/dL Final   01/05/2022 0.8 0.5 - 1.4 mg/dL Final     -- follow UOP  -- continue mahan     FEN / GI:   -- Strict NPO for now  -- HOLD TFs per ENT  -- Ok to use G tube for medication use     -- Electrolytes:   Recent Labs   Lab 01/06/22  0311   *   K 4.4      CO2 25   BUN 37*   CREATININE 0.8   MG 2.1      -- Nutrition:   -- replace electrolytes as needed to keep K>4, Mg>2  -- bowel reg - daily miralax, docusate  -- famotidine for GI prophylaxis     ID:   -- Tm: afebrile  -- WBC , trend daily     Heme/Onc:   -- H/H stable  -- past products received: none  Lab Results   Component Value Date    WBC 13.31 (H) 01/05/2022    HGB 11.7 (L) 01/05/2022    HCT 35.6 (L) 01/05/2022    MCV 86 01/05/2022     (H) 01/05/2022        Endo:   #S/p thyroidectomy   -- Trend ionized Ca2+ q8h  -- f/u PTH   -- continue levothyroxine    #DM2  -- endocrine following, appreciate recommendations  -- continue 10u determire qHs  -- continue MDSSI      PPx:   Feeding: Regular diet / NPO for now. Hold TFs per  ENT  Analgesia/Sedation: none  Thromboembolic prevention:LVX / SCD  HOB 30  Stress Ulcer ppx: famotidine  Glucose control: Critical care goal 140-180 g/dl  Turn q2 hrs     Dispo/Code Status/Palliative:   -- continue care in the SICU    Anup Jose MD  [facility]

## 2022-01-06 NOTE — ANESTHESIA PROCEDURE NOTES
Arterial    Diagnosis: laryngeal cancer    Patient location during procedure: done in OR  Procedure start time: 1/6/2022 8:40 AM  Timeout: 1/6/2022 8:40 AM  Procedure end time: 1/6/2022 8:45 AM    Staffing  Authorizing Provider: David Cade MD  Performing Provider: Antony Dsouza MD    Anesthesiologist was present at the time of the procedure.    Preanesthetic Checklist  Completed: patient identified, IV checked, site marked, risks and benefits discussed, surgical consent, monitors and equipment checked, pre-op evaluation, timeout performed and anesthesia consent givenArterial  Skin Prep: chlorhexidine gluconate  Local Infiltration: none  Orientation: left  Location: radial    Catheter Size: 20 G  Catheter placement by Anatomical landmarks. Heme positive aspiration all ports.Insertion Attempts: 1  Assessment  Dressing: secured with tape and tegaderm  Patient: Tolerated well

## 2022-01-07 PROBLEM — T38.0X5S ADVERSE EFFECT OF ADRENAL CORTICAL STEROIDS, SEQUELA: Status: ACTIVE | Noted: 2022-01-07

## 2022-01-07 LAB
ALBUMIN SERPL BCP-MCNC: 1.9 G/DL (ref 3.5–5.2)
ALP SERPL-CCNC: 126 U/L (ref 55–135)
ALT SERPL W/O P-5'-P-CCNC: 19 U/L (ref 10–44)
ANION GAP SERPL CALC-SCNC: 6 MMOL/L (ref 8–16)
ANISOCYTOSIS BLD QL SMEAR: SLIGHT
AST SERPL-CCNC: 36 U/L (ref 10–40)
BASO STIPL BLD QL SMEAR: ABNORMAL
BASOPHILS # BLD AUTO: 0.03 K/UL (ref 0–0.2)
BASOPHILS NFR BLD: 0.1 % (ref 0–1.9)
BILIRUB SERPL-MCNC: 0.6 MG/DL (ref 0.1–1)
BUN SERPL-MCNC: 33 MG/DL (ref 8–23)
BURR CELLS BLD QL SMEAR: ABNORMAL
CA-I BLDV-SCNC: 0.91 MMOL/L (ref 1.06–1.42)
CA-I BLDV-SCNC: 1.03 MMOL/L (ref 1.06–1.42)
CA-I BLDV-SCNC: 1.17 MMOL/L (ref 1.06–1.42)
CALCIUM SERPL-MCNC: 6.8 MG/DL (ref 8.7–10.5)
CHLORIDE SERPL-SCNC: 109 MMOL/L (ref 95–110)
CO2 SERPL-SCNC: 20 MMOL/L (ref 23–29)
CREAT SERPL-MCNC: 0.8 MG/DL (ref 0.5–1.4)
DIFFERENTIAL METHOD: ABNORMAL
EOSINOPHIL # BLD AUTO: 0 K/UL (ref 0–0.5)
EOSINOPHIL NFR BLD: 0 % (ref 0–8)
ERYTHROCYTE [DISTWIDTH] IN BLOOD BY AUTOMATED COUNT: 13 % (ref 11.5–14.5)
EST. GFR  (AFRICAN AMERICAN): >60 ML/MIN/1.73 M^2
EST. GFR  (NON AFRICAN AMERICAN): >60 ML/MIN/1.73 M^2
GIANT PLATELETS BLD QL SMEAR: PRESENT
GLUCOSE SERPL-MCNC: 231 MG/DL (ref 70–110)
HCT VFR BLD AUTO: 29.2 % (ref 40–54)
HGB BLD-MCNC: 9.3 G/DL (ref 14–18)
HYPOCHROMIA BLD QL SMEAR: ABNORMAL
IMM GRANULOCYTES # BLD AUTO: 0.25 K/UL (ref 0–0.04)
IMM GRANULOCYTES NFR BLD AUTO: 1.1 % (ref 0–0.5)
LYMPHOCYTES # BLD AUTO: 0.8 K/UL (ref 1–4.8)
LYMPHOCYTES NFR BLD: 3.4 % (ref 18–48)
MAGNESIUM SERPL-MCNC: 1.7 MG/DL (ref 1.6–2.6)
MCH RBC QN AUTO: 28 PG (ref 27–31)
MCHC RBC AUTO-ENTMCNC: 31.8 G/DL (ref 32–36)
MCV RBC AUTO: 88 FL (ref 82–98)
MONOCYTES # BLD AUTO: 1.1 K/UL (ref 0.3–1)
MONOCYTES NFR BLD: 5 % (ref 4–15)
NEUTROPHILS # BLD AUTO: 20.5 K/UL (ref 1.8–7.7)
NEUTROPHILS NFR BLD: 90.4 % (ref 38–73)
NRBC BLD-RTO: 0 /100 WBC
OVALOCYTES BLD QL SMEAR: ABNORMAL
PHOSPHATE SERPL-MCNC: 3.8 MG/DL (ref 2.7–4.5)
PLATELET # BLD AUTO: 719 K/UL (ref 150–450)
PLATELET BLD QL SMEAR: ABNORMAL
PMV BLD AUTO: 9.5 FL (ref 9.2–12.9)
POCT GLUCOSE: 183 MG/DL (ref 70–110)
POCT GLUCOSE: 202 MG/DL (ref 70–110)
POCT GLUCOSE: 214 MG/DL (ref 70–110)
POCT GLUCOSE: 261 MG/DL (ref 70–110)
POIKILOCYTOSIS BLD QL SMEAR: SLIGHT
POLYCHROMASIA BLD QL SMEAR: ABNORMAL
POTASSIUM SERPL-SCNC: 5 MMOL/L (ref 3.5–5.1)
PROT SERPL-MCNC: 4.3 G/DL (ref 6–8.4)
RBC # BLD AUTO: 3.32 M/UL (ref 4.6–6.2)
SCHISTOCYTES BLD QL SMEAR: ABNORMAL
SCHISTOCYTES BLD QL SMEAR: PRESENT
SODIUM SERPL-SCNC: 135 MMOL/L (ref 136–145)
TOXIC GRANULES BLD QL SMEAR: PRESENT
WBC # BLD AUTO: 22.67 K/UL (ref 3.9–12.7)

## 2022-01-07 PROCEDURE — 63600175 PHARM REV CODE 636 W HCPCS: Mod: HCNC | Performed by: STUDENT IN AN ORGANIZED HEALTH CARE EDUCATION/TRAINING PROGRAM

## 2022-01-07 PROCEDURE — 82330 ASSAY OF CALCIUM: CPT | Mod: HCNC | Performed by: STUDENT IN AN ORGANIZED HEALTH CARE EDUCATION/TRAINING PROGRAM

## 2022-01-07 PROCEDURE — 82330 ASSAY OF CALCIUM: CPT | Mod: 91,HCNC | Performed by: STUDENT IN AN ORGANIZED HEALTH CARE EDUCATION/TRAINING PROGRAM

## 2022-01-07 PROCEDURE — 63600175 PHARM REV CODE 636 W HCPCS: Mod: HCNC | Performed by: OTOLARYNGOLOGY

## 2022-01-07 PROCEDURE — 99291 CRITICAL CARE FIRST HOUR: CPT | Mod: HCNC,,, | Performed by: STUDENT IN AN ORGANIZED HEALTH CARE EDUCATION/TRAINING PROGRAM

## 2022-01-07 PROCEDURE — 25000003 PHARM REV CODE 250: Mod: HCNC | Performed by: STUDENT IN AN ORGANIZED HEALTH CARE EDUCATION/TRAINING PROGRAM

## 2022-01-07 PROCEDURE — 97535 SELF CARE MNGMENT TRAINING: CPT | Mod: HCNC

## 2022-01-07 PROCEDURE — 25000003 PHARM REV CODE 250: Mod: HCNC | Performed by: NURSE PRACTITIONER

## 2022-01-07 PROCEDURE — 20000000 HC ICU ROOM: Mod: HCNC

## 2022-01-07 PROCEDURE — 63600175 PHARM REV CODE 636 W HCPCS: Mod: HCNC | Performed by: NURSE PRACTITIONER

## 2022-01-07 PROCEDURE — 83735 ASSAY OF MAGNESIUM: CPT | Mod: HCNC | Performed by: STUDENT IN AN ORGANIZED HEALTH CARE EDUCATION/TRAINING PROGRAM

## 2022-01-07 PROCEDURE — 99900026 HC AIRWAY MAINTENANCE (STAT): Mod: HCNC

## 2022-01-07 PROCEDURE — 92597 ORAL SPEECH DEVICE EVAL: CPT | Mod: HCNC

## 2022-01-07 PROCEDURE — 99232 SBSQ HOSP IP/OBS MODERATE 35: CPT | Mod: HCNC,,, | Performed by: NURSE PRACTITIONER

## 2022-01-07 PROCEDURE — 63700000 PHARM REV CODE 250 ALT 637 W/O HCPCS: Mod: HCNC | Performed by: STUDENT IN AN ORGANIZED HEALTH CARE EDUCATION/TRAINING PROGRAM

## 2022-01-07 PROCEDURE — 85025 COMPLETE CBC W/AUTO DIFF WBC: CPT | Mod: HCNC | Performed by: STUDENT IN AN ORGANIZED HEALTH CARE EDUCATION/TRAINING PROGRAM

## 2022-01-07 PROCEDURE — 84100 ASSAY OF PHOSPHORUS: CPT | Mod: HCNC | Performed by: STUDENT IN AN ORGANIZED HEALTH CARE EDUCATION/TRAINING PROGRAM

## 2022-01-07 PROCEDURE — 25000242 PHARM REV CODE 250 ALT 637 W/ HCPCS: Mod: HCNC | Performed by: STUDENT IN AN ORGANIZED HEALTH CARE EDUCATION/TRAINING PROGRAM

## 2022-01-07 PROCEDURE — 99900035 HC TECH TIME PER 15 MIN (STAT): Mod: HCNC

## 2022-01-07 PROCEDURE — 27000221 HC OXYGEN, UP TO 24 HOURS: Mod: HCNC

## 2022-01-07 PROCEDURE — 25000003 PHARM REV CODE 250: Mod: HCNC | Performed by: OTOLARYNGOLOGY

## 2022-01-07 PROCEDURE — 80053 COMPREHEN METABOLIC PANEL: CPT | Mod: HCNC | Performed by: STUDENT IN AN ORGANIZED HEALTH CARE EDUCATION/TRAINING PROGRAM

## 2022-01-07 PROCEDURE — 94761 N-INVAS EAR/PLS OXIMETRY MLT: CPT | Mod: HCNC

## 2022-01-07 PROCEDURE — 99232 PR SUBSEQUENT HOSPITAL CARE,LEVL II: ICD-10-PCS | Mod: HCNC,,, | Performed by: NURSE PRACTITIONER

## 2022-01-07 PROCEDURE — 99291 PR CRITICAL CARE, E/M 30-74 MINUTES: ICD-10-PCS | Mod: HCNC,,, | Performed by: STUDENT IN AN ORGANIZED HEALTH CARE EDUCATION/TRAINING PROGRAM

## 2022-01-07 RX ORDER — CALCIUM CARBONATE 1250 MG/5ML
1500 SUSPENSION ORAL 3 TIMES DAILY
Status: DISCONTINUED | OUTPATIENT
Start: 2022-01-07 | End: 2022-01-13

## 2022-01-07 RX ORDER — FAMOTIDINE 10 MG/ML
20 INJECTION INTRAVENOUS 2 TIMES DAILY
Status: DISCONTINUED | OUTPATIENT
Start: 2022-01-07 | End: 2022-01-10

## 2022-01-07 RX ORDER — INSULIN ASPART 100 [IU]/ML
0-5 INJECTION, SOLUTION INTRAVENOUS; SUBCUTANEOUS
Status: DISCONTINUED | OUTPATIENT
Start: 2022-01-07 | End: 2022-01-12

## 2022-01-07 RX ORDER — ENOXAPARIN SODIUM 100 MG/ML
40 INJECTION SUBCUTANEOUS EVERY 24 HOURS
Status: DISCONTINUED | OUTPATIENT
Start: 2022-01-07 | End: 2022-01-10

## 2022-01-07 RX ORDER — CALCITRIOL 1 UG/ML
0.5 SOLUTION ORAL 2 TIMES DAILY
Status: DISCONTINUED | OUTPATIENT
Start: 2022-01-07 | End: 2022-01-13

## 2022-01-07 RX ORDER — IBUPROFEN 200 MG
24 TABLET ORAL
Status: DISCONTINUED | OUTPATIENT
Start: 2022-01-07 | End: 2022-01-10

## 2022-01-07 RX ORDER — IBUPROFEN 200 MG
16 TABLET ORAL
Status: DISCONTINUED | OUTPATIENT
Start: 2022-01-07 | End: 2022-01-10

## 2022-01-07 RX ORDER — CALCIUM CARBONATE 200(500)MG
1000 TABLET,CHEWABLE ORAL ONCE
Status: COMPLETED | OUTPATIENT
Start: 2022-01-07 | End: 2022-01-07

## 2022-01-07 RX ORDER — GLUCAGON 1 MG
1 KIT INJECTION
Status: DISCONTINUED | OUTPATIENT
Start: 2022-01-07 | End: 2022-01-12

## 2022-01-07 RX ADMIN — INSULIN HUMAN 0.4 UNITS/HR: 1 INJECTION, SOLUTION INTRAVENOUS at 12:01

## 2022-01-07 RX ADMIN — ENOXAPARIN SODIUM 40 MG: 100 INJECTION SUBCUTANEOUS at 04:01

## 2022-01-07 RX ADMIN — DILTIAZEM HYDROCHLORIDE 30 MG: 30 TABLET, FILM COATED ORAL at 12:01

## 2022-01-07 RX ADMIN — ACETAMINOPHEN 650 MG: 160 SOLUTION ORAL at 05:01

## 2022-01-07 RX ADMIN — OXYCODONE HYDROCHLORIDE 10 MG: 5 SOLUTION ORAL at 12:01

## 2022-01-07 RX ADMIN — AMPICILLIN SODIUM AND SULBACTAM SODIUM 3 G: 2; 1 INJECTION, POWDER, FOR SOLUTION INTRAMUSCULAR; INTRAVENOUS at 06:01

## 2022-01-07 RX ADMIN — INSULIN ASPART 1 UNITS: 100 INJECTION, SOLUTION INTRAVENOUS; SUBCUTANEOUS at 04:01

## 2022-01-07 RX ADMIN — AMPICILLIN SODIUM AND SULBACTAM SODIUM 3 G: 2; 1 INJECTION, POWDER, FOR SOLUTION INTRAMUSCULAR; INTRAVENOUS at 11:01

## 2022-01-07 RX ADMIN — Medication 800 MG: at 05:01

## 2022-01-07 RX ADMIN — OXYCODONE HYDROCHLORIDE 5 MG: 5 SOLUTION ORAL at 04:01

## 2022-01-07 RX ADMIN — CALCIUM CARBONATE (ANTACID) CHEW TAB 500 MG 1000 MG: 500 CHEW TAB at 05:01

## 2022-01-07 RX ADMIN — OXYCODONE HYDROCHLORIDE 5 MG: 5 SOLUTION ORAL at 07:01

## 2022-01-07 RX ADMIN — AMPICILLIN SODIUM AND SULBACTAM SODIUM 3 G: 2; 1 INJECTION, POWDER, FOR SOLUTION INTRAMUSCULAR; INTRAVENOUS at 03:01

## 2022-01-07 RX ADMIN — INSULIN ASPART 1 UNITS: 100 INJECTION, SOLUTION INTRAVENOUS; SUBCUTANEOUS at 08:01

## 2022-01-07 RX ADMIN — FAMOTIDINE 20 MG: 10 INJECTION, SOLUTION INTRAVENOUS at 08:01

## 2022-01-07 RX ADMIN — INSULIN ASPART 2 UNITS: 100 INJECTION, SOLUTION INTRAVENOUS; SUBCUTANEOUS at 08:01

## 2022-01-07 RX ADMIN — DILTIAZEM HYDROCHLORIDE 30 MG: 30 TABLET, FILM COATED ORAL at 05:01

## 2022-01-07 RX ADMIN — LEVOTHYROXINE SODIUM 112 MCG: 112 TABLET ORAL at 05:01

## 2022-01-07 RX ADMIN — CALCIUM CARBONATE 1500 MG: 1250 SUSPENSION ORAL at 02:01

## 2022-01-07 RX ADMIN — CALCIUM CARBONATE 1500 MG: 1250 SUSPENSION ORAL at 08:01

## 2022-01-07 RX ADMIN — CALCIUM GLUCONATE 1 G: 98 INJECTION, SOLUTION INTRAVENOUS at 10:01

## 2022-01-07 RX ADMIN — CALCITRIOL 0.5 MCG: 1 SOLUTION ORAL at 08:01

## 2022-01-07 NOTE — SUBJECTIVE & OBJECTIVE
Interval History/Significant Events:   - NAEON  - pain control with PO oxycodone and a dose of breakthrough IV dilaudid  - replaced lytes overnight  - d/c mahan and a line    Follow-up For: Procedure(s) (LRB):  LARYNGECTOMY (N/A)  DISSECTION, NECK (Bilateral)  CREATION, FREE FLAP (Right)  THYROIDECTOMY  LARYNGOSCOPY (N/A)  REIMPLANTATION, PARATHYROID TISSUE (N/A)    Post-Operative Day: 1 Day Post-Op    Objective:     Vital Signs (Most Recent):  Temp: 98.6 °F (37 °C) (01/07/22 0430)  Pulse: 73 (01/07/22 0430)  Resp: 12 (01/07/22 0430)  BP: 122/67 (01/07/22 0400)  SpO2: 99 % (01/07/22 0430) Vital Signs (24h Range):  Temp:  [96.62 °F (35.9 °C)-98.78 °F (37.1 °C)] 98.6 °F (37 °C)  Pulse:  [70-86] 73  Resp:  [7-25] 12  SpO2:  [99 %-100 %] 99 %  BP: (113-129)/(58-71) 122/67  Arterial Line BP: (110-148)/(57-80) 126/67     Weight: 66 kg (145 lb 8.1 oz)  Body mass index is 23.48 kg/m².      Intake/Output Summary (Last 24 hours) at 1/7/2022 0537  Last data filed at 1/7/2022 0400  Gross per 24 hour   Intake 3970.77 ml   Output 1325 ml   Net 2645.77 ml       Physical Exam  Constitutional:       General: He is not in acute distress.  HENT:      Head: Normocephalic.      Nose: Nose normal.      Mouth/Throat:      Mouth: Mucous membranes are dry.   Eyes:      Extraocular Movements: Extraocular movements intact.      Pupils: Pupils are equal, round, and reactive to light.   Neck:      Comments: Neck drains with light bloody output  Free flap with good pulsation on doppler.   Cardiovascular:      Rate and Rhythm: Normal rate and regular rhythm.      Pulses: Normal pulses.      Heart sounds: Normal heart sounds.   Pulmonary:      Effort: Pulmonary effort is normal. No respiratory distress.      Breath sounds: Normal breath sounds.      Comments: Paul tube with trach collar   Abdominal:      General: Abdomen is flat. Bowel sounds are normal. There is no distension.      Palpations: Abdomen is soft.   Musculoskeletal:      Comments:  RLE excision for flap with 1x drain in place   Skin:     General: Skin is warm and dry.   Neurological:      General: No focal deficit present.      Mental Status: He is alert.   Psychiatric:         Mood and Affect: Mood normal.         Vents:  Oxygen Concentration (%): 28 (01/07/22 0409)    Lines/Drains/Airways     Drain                 Gastrostomy/Enterostomy 12/27/21 1152 Percutaneous endoscopic gastrostomy (PEG) LUQ 10 days         Closed/Suction Drain 01/06/22 Right Thigh Bulb 15 Fr. 1 day         Closed/Suction Drain 01/06/22 Right;Anterior Thigh Bulb 15 Fr. 1 day         Closed/Suction Drain 01/06/22 1521 Right Neck Bulb 15 Fr. <1 day         Closed/Suction Drain 01/06/22 1524 Left Neck Bulb 15 Fr. <1 day         Urethral Catheter 01/06/22 0905 Non-latex 16 Fr. <1 day          Airway                 Laryngectomy (Do Not Remove) 01/06/22 1655 Laryngectomy tube <1 day          Arterial Line            Arterial Line 01/06/22 0840 Left Radial <1 day          Peripheral Intravenous Line                 Midline Catheter Insertion/Assessment  - Single Lumen 01/02/22 1410 Right basilic vein (medial side of arm) other (see comments) 4 days         Peripheral IV - Single Lumen 01/06/22 0853 18 G Right Hand <1 day                Significant Labs:    CBC/Anemia Profile:  Recent Labs   Lab 01/05/22 1953 01/05/22 1953 01/06/22 1700 01/06/22 1933 01/07/22  0332   WBC 13.31*   < > 18.55* 20.60* 22.67*   HGB 11.7*   < > 9.6* 9.4* 9.3*   HCT 35.6*   < > 30.3* 29.1* 29.2*   *  --  447 392  --    MCV 86   < > 88 87 88   RDW 13.2   < > 13.1 13.1 13.0    < > = values in this interval not displayed.        Chemistries:  Recent Labs   Lab 01/06/22 0311 01/06/22 1700 01/07/22  0332   * 133* 135*   K 4.4 4.7 5.0    102 109   CO2 25 22* 20*   BUN 37* 37* 33*   CREATININE 0.8 1.0 0.8   CALCIUM 10.9* 7.7* 6.8*   ALBUMIN 2.6*  --  1.9*   PROT 6.4  --  4.3*   BILITOT 0.9  --  0.6   ALKPHOS 200*  --  126   ALT 31   --  19   AST 14  --  36   MG 2.1 2.0 1.7   PHOS 3.0 4.4 3.8       All pertinent labs within the past 24 hours have been reviewed.    Significant Imaging:  I have reviewed all pertinent imaging results/findings within the past 24 hours.

## 2022-01-07 NOTE — PLAN OF CARE
"      SICU PLAN OF CARE NOTE    Dx: Larynx neoplasm malignant    Shift Events: NAEON. Flap checks Q1H, flap remains soft, warm, with strong pulses. Pain managed with PRN dilaudid and oxycodone and tylenol. Electrolytes replaced as ordered.    Goals of Care: MAPs > 65; SBP < 180; SpO2 > 90%; pain management    Neuro: AAO x4, Follows Commands, and Moves All Extremities    Vital Signs: /67 (BP Location: Left arm, Patient Position: Lying)   Pulse 73   Temp 98.6 °F (37 °C)   Resp 12   Ht 5' 6" (1.676 m)   Wt 66 kg (145 lb 8.1 oz)   SpO2 99%   BMI 23.48 kg/m²     Respiratory: 5L 28% provided through a trach collar over laryngectomy tube to provide humidification for patient, SpO2 100%    Cardiac: NSR; HR 70-80's overnight    Diet: NPO; ok for meds through PEG tube    Gtts: MIVF    Urine Output: Urinary Catheter 805 cc/shift    Drains: See output flowsheets for details.    Flap Checks Q1H; flap remains pale pink, soft and warm. Strong pulses. No complications overnight.     Labs/Accuchecks: Q12H CBC; Accuchecks. Labs trended overnight.     Skin: No new skin breakdown noted. Turned Q2H with wedge. Foams and heel boots in place.       "

## 2022-01-07 NOTE — PROGRESS NOTES
[facility]  Otorhinolaryngology-Head & Neck Surgery  Progress Note    Subjective:     Post-Op Info:  Procedure(s) (LRB):  LARYNGECTOMY (N/A)  DISSECTION, NECK (Bilateral)  CREATION, FREE FLAP (Right)  THYROIDECTOMY  LARYNGOSCOPY (N/A)  REIMPLANTATION, PARATHYROID TISSUE (N/A)   1 Day Post-Op  Hospital Day: 14     Interval History: Did well overnight. No flap issues.     Medications:  Continuous Infusions:   sodium chloride 0.9% 50 mL/hr at 01/07/22 0600     Scheduled Meds:   ampicillin-sulbactim (UNASYN) IVPB  3 g Intravenous Q6H    calcitrioL  0.5 mcg Per G Tube BID    calcium carbonate  1,500 mg Per G Tube TID    diltiaZEM  30 mg Per G Tube Q6H    enoxaparin  40 mg Subcutaneous Daily    famotidine  20 mg Per G Tube BID    insulin detemir U-100  10 Units Subcutaneous QHS    levothyroxine  112 mcg Per G Tube Before breakfast    senna-docusate 8.6-50 mg  1 tablet Per G Tube BID     PRN Meds:acetaminophen, HYDROmorphone, insulin aspart U-100, magnesium oxide, magnesium oxide, oxyCODONE, oxyCODONE, potassium bicarbonate, potassium bicarbonate, potassium bicarbonate, potassium, sodium phosphates, potassium, sodium phosphates, potassium, sodium phosphates, sodium chloride 0.9%     Review of patient's allergies indicates:   Allergen Reactions    Pollen extracts     Lovastatin Rash     Not confirmed but pt skeptical     Objective:     Vital Signs (24h Range):  Temp:  [96.62 °F (35.9 °C)-98.78 °F (37.1 °C)] 98.42 °F (36.9 °C)  Pulse:  [67-86] 67  Resp:  [7-25] 13  SpO2:  [99 %-100 %] 100 %  BP: (101-129)/(57-71) 101/57  Arterial Line BP: (110-148)/(52-80) 117/52       Lines/Drains/Airways     Drain                 Gastrostomy/Enterostomy 12/27/21 1152 Percutaneous endoscopic gastrostomy (PEG) LUQ 10 days         Closed/Suction Drain 01/06/22 Right Thigh Bulb 15 Fr. 1 day         Closed/Suction Drain 01/06/22 Right;Anterior Thigh Bulb 15 Fr. 1 day         Closed/Suction Drain 01/06/22 1521  "Right Neck Bulb 15 Fr. <1 day         Closed/Suction Drain 01/06/22 1524 Left Neck Bulb 15 Fr. <1 day         Urethral Catheter 01/06/22 0905 Non-latex 16 Fr. <1 day          Airway                 Laryngectomy (Do Not Remove) 01/06/22 1655 Laryngectomy tube <1 day          Arterial Line            Arterial Line 01/06/22 0840 Left Radial <1 day          Peripheral Intravenous Line                 Midline Catheter Insertion/Assessment  - Single Lumen 01/02/22 1410 Right basilic vein (medial side of arm) other (see comments) 4 days         Peripheral IV - Single Lumen 01/06/22 0853 18 G Right Hand <1 day                Physical Exam  Awake, alert  EOMI  Neck - soft, incision c/d/i bilaterally. Flap skin paddle with good color, doppler, turgor. Bilateral neck drains serosanguinous. R - 20 L- 59. Stoma intact, mp tube in place.  RLE - island dressing intact, not saturated. GLENN x 2 in place - 4, 17cc SS output    Significant Labs:  BMP:   Recent Labs   Lab 01/07/22  0332   *      CO2 20*   BUN 33*   CREATININE 0.8   CALCIUM 6.8*   MG 1.7     CBC:   Recent Labs   Lab 01/07/22  0332   WBC 22.67*   RBC 3.32*   HGB 9.3*   HCT 29.2*   *   MCV 88   MCH 28.0   MCHC 31.8*       Significant Diagnostics:  I have reviewed and interpreted all pertinent imaging results/findings within the past 24 hours.    Assessment/Plan:     * Larynx neoplasm malignant  - Continue ICU care   Continue q1h flap checks: Record Doppler Pulses (artery and vein) ,Capillary Refill , Color, Turgor, temperature.   - Neurovascular Checks q1h of bilateral upper and lower extremities   - Keep head elevated and in neutral position  - Sign above bed that reads "No circumferential Neck Ties"   - FOR ANY FLAP ISSUES, PLEASE PHONE ENT RESIDENT ON CALL.  - Please label drains carefully and document accordingly  - Maintain xeroform in left ear canal  - Bacitracin twice daily to incision line     Neuro:  - Alert, oriented. Pain medication PRN.    "   CV:  - HDS. SBP > 90. A-line can be removed  - continue with q1h flap checks     Pulm:  - stable     GI/FEN/Lytes:  - Strict NPO  - monitor calcium closely and replete prn  - start trickle TFs tomorrow  - replace lytes prn     Renal:  - UOP adequate. Juarez out.      Heme/ID:  - cont to trend HH  - cont Unasyn     Endo:  - Q6 acuchecks, SSI.      PPX:  PT/OT  L40  PPI     Dispo:   Continue ICU care for 48 hours for q1h flap checks                        Yo Herrera MD  Otorhinolaryngology-Head & Neck Surgery  [facility]

## 2022-01-07 NOTE — PT/OT/SLP EVAL
[facility] - [facility] Intensive [facility]    Post-Operative Total Laryngectomy Evaluation    Patient Name:  Cyrus Batres Jr.   MRN:  [id number]  Admitting Diagnosis: Larynx neoplasm malignant    PATIENT IS A NECK-BREATHER - DO NOT ORALLY INTUBATE    Recommendations:                 General Recommendations:  Post-op laryngectomy training  Diet recommendations:  Patient NPO until cleared by ENT  Aspiration Precautions: Strict aspiration precautions  General Precautions: Standard, NPO,fall,respiratory    Communication:      Communication strategies: Provide increased time to answer, Go to room if call light pushed, Encourage use of communication device (AL) and Whiteboard/Paper-pencil provided   Patient communicating all wants and needs? yes   Supplies labeled with patient information?  yes   RN notified if supplies brought to patient's room?  yes    History:     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       Past Surgical History:   Procedure Laterality Date    DIRECT LARYNGOBRONCHOSCOPY N/A 12/27/2021    Procedure: LARYNGOSCOPY, DIRECT, WITH BRONCHOSCOPY;  Surgeon: Flex Espinosa MD;  Location: [address];  Service: ENT;  Laterality: N/A;    DISSECTION OF NECK Bilateral 1/6/2022    Procedure: DISSECTION, NECK;  Surgeon: Jesse James MD;  Location: [address];  Service: ENT;  Laterality: Bilateral;    FLAP PROCEDURE Right 1/6/2022    Procedure: CREATION, FREE FLAP;  Surgeon: Alise Hart MD;  Location: [address];  Service: ENT;  Laterality: Right;  Ischemic start 1351  Ischemic stop 1502    LARYNGECTOMY N/A 1/6/2022    Procedure: LARYNGECTOMY;  Surgeon: Jesse James MD;  Location: [address];  Service: ENT;  Laterality: N/A;    LARYNGOSCOPY N/A 8/4/2020    Procedure: Suspension microlaryngoscopy with biopsy,  possible KTP laser treatment/excision;  Surgeon: Stew Noel MD;  [facility];  Service: ENT;  Laterality: N/A;  Microscope, telescopes, tower, microinstruments, KTP laser, rep conf# [id number] IC 7/28.    LARYNGOSCOPY N/A 3/16/2021    Procedure: Suspension microlaryngoscopy with excision of lesion, possible CO2 laser;  Surgeon: Stew Noel MD;  [facility];  Service: ENT;  Laterality: N/A;  Microscope, telescopes, tower, microinstruments, CO2 laser, rep conf# [id number] IC 3/4.    LARYNGOSCOPY N/A 4/1/2021    Procedure: Suspension microlaryngoscopy with KTP laser excision of lesion;  Surgeon: Stew Noel MD;  [facility];  Service: ENT;  Laterality: N/A;  Microscope, telescopes, tower, microinstruments, 70 degree scope, vocal fold , KTP laser, rep conf# [id number] BC    LARYNGOSCOPY N/A 12/9/2021    Procedure: Suspension microlaryngoscopy with biopsy;  Surgeon: Stew Noel MD;  [facility];  Service: ENT;  Laterality: N/A;  Microscope, telescopes, tower, microinstruments    LARYNGOSCOPY N/A 1/6/2022    Procedure: LARYNGOSCOPY;  Surgeon: Jesse James MD;  Location: [address];  Service: ENT;  Laterality: N/A;    MICROLARYNGOSCOPY N/A 3/17/2020    Procedure: MICROLARYNGOSCOPY;  Surgeon: Jung Xiao MD;  Location: [facility];  Service: ENT;  Laterality: N/A;  Laser Microlaryngoscopy  NEED TO SCHEDULE LASER from [facility] [employer] [id number]    REIMPLANTATION OF PARATHYROID TISSUE N/A 1/6/2022    Procedure: REIMPLANTATION, PARATHYROID TISSUE;  Surgeon: Jesse James MD;  [facility];  Service: ENT;  Laterality: N/A;    THYROIDECTOMY  1/6/2022    Procedure: THYROIDECTOMY;  Surgeon: Jesse James MD;  Location: [facility] OR [address] [place] [address];  Service: ENT;;    TRACHEOSTOMY N/A 12/27/2021    Procedure: CREATION, TRACHEOSTOMY;  Surgeon: Flex Espinosa MD;  Location: [facility] OR [address] [place] [address];  Service: ENT;   "Laterality: N/A;       Date of Surgery: 1/6/22    The patient received a total laryngectomy with bilateral neck dissection.  PEG placed at time of surgery?  No, placed prior  TEP placed at time of surgery?  no    Prior to Surgery - Prior Level of Functioning:     Did patient attend pre-surgical speech-language pathology consultation as an outpatient? 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   yes    Subjective:     Patient awake and alert  Communicated with nurse prior to session    Pain/Comfort:  · Pain Rating 1: 0/10  · Pain Rating Post-Intervention 1: 0/10  · Location 2: abdomen  · Pain Addressed 2: Reposition,Distraction    Respiratory Status: Trach collar    Objective:     Oral Musculature Evaluation:  · Oral Musculature: WFL  · Dentition: present and adequate  · Oral Labial Strength and Mobility: WFL  · Lingual Strength and Mobility: WFL  · Voice Prior to PO Intake: unable to tolerate PMSV    Post-Op Communication Evaluation:  · Writing:   Legible  · Gestures/Facial Expressions  · Mouthing Words  · Artificial Larynx Trials:  Yes  Placement:  Able to achieve with cues  · Timing:  Ongoing cues required  · Intelligibility:  Word 75%    Stoma Observations:  · Dry  · Intact    Equipment in Stoma:  · Cheli Tube  [employer]ox 9/55  · Heat Moisture Exchanger (HME)?  no    Equipment/Supplies Provided to Patient:  · Laryngectomy Pulmonary Kit with Standard Provox LaryTube 9/55.  Contents of kit include LarryTube 9/55 Standard, TubeBrush, TubeHolder, ShowerAid, XtraFlow HME, 30 pcs, Patient Literature    Education:     General Post-Operative Education Provided:  · SLP discussed:   Anatomical changes that will occur in respiration, communication, coughing, sneezing, blowing nose, and swallowing   · Timelines of swallowing assessments and " "progression of textures were detailed  · Patient will be a total neck breather and that in the event of emergency, oxygen must be provided to stoma   · Patient advised to contact local fire department to update emergency personnel that patient will be a "total neck breather"   · Use and benefits of heat and moisture exchanges (HMEs) to provide humidity to stoma   · Basic surgical course and recovery  · Expectations for follow up and discharge   · Four primary options for communication post total laryngectomy, including speech with an artificial larynx, TEP speech, esophageal speech, and use of a writing/AAC device  · Lifestyle changes, including importance of avoiding water to stoma provided.  · SLP demonstrated use of an artificial larynx (AL) on neck and external cheek. Discussed AL speech with an oral adaptor.  · Patient engaged in education and demonstrating adequate understanding of all topics reviewed.  · Discussed Head and Neck Support Group available at [facility].  · family demonstrated understanding.  · Ongoing education and support warranted.  · Whiteboard current.    Alternate Airway Precautions in Place:  · All laryngectomy precautions present: Orange signs above HOB and on door, communication impaired wristband, Pediatric ambu bag mask (size 3)  ·  notified patient has laryngectomy and to call RN immediately when patient presses call bell.  · RN notified to enter room immediately when called to attend patient.    Impressions:  · Patient would benefit from ongoing education/support of  Post-operative anatomy, physical and functional limitations  · Permanency of thacheostoma  · Contents of post-laryngectomy kit  · The ability to perform Stoma care.  · Patient  does present with adequate communication skills.    Goals:   Multidisciplinary Problems     SLP Goals        Problem: SLP Goal    Goal Priority Disciplines Outcome   SLP Goal     SLP Ongoing, Progressing   Description: Speech " Language Pathology Goals  Goals expected to be met by 1/15    1. Pt/family will actively participate in post-laryngectomy education to facilitate [place] and desensitization  2. Pt will communicate contextualized phrases via AL with approximately 60% intelligibility provided min cues to improve communication.  3. Pt/family will perform stoma care x1 per session provided min cues to facilitate [place].                           Plan:     · Patient to be seen:  5 x/week   · Plan of Care expires:  02/06/22  · Plan of Care reviewed with:  patient,spouse   · SLP Follow-Up:  Yes       Discharge recommendations:  outpatient speech therapy   Barriers to Discharge:  None    Time Tracking:     SLP Treatment Date:   01/07/22  Speech Start Time:  1050  Speech Stop Time:  1122     Speech Total Time (min):  32 min    Billable Minutes: Evaluation Use/Fit Voiced Prosthetic 8 and Self Care/Home Management Training 24    Chevy Bolanos CCC-SLP  Speech-Language Pathology  Pager: [phone]   01/07/2022

## 2022-01-07 NOTE — PROGRESS NOTES
[facility]  Critical Care - Surgery  Progress Note    Patient Name: Cyrus Batres Jr.  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       Interval History/Significant Events:   - NAEON  - pain control with PO oxycodone and a dose of breakthrough IV dilaudid  - replaced lytes overnight  - d/c mahan and a line    Follow-up For: Procedure(s) (LRB):  LARYNGECTOMY (N/A)  DISSECTION, NECK (Bilateral)  CREATION, FREE FLAP (Right)  THYROIDECTOMY  LARYNGOSCOPY (N/A)  REIMPLANTATION, PARATHYROID TISSUE (N/A)    Post-Operative Day: 1 Day Post-Op    Objective:     Vital Signs (Most Recent):  Temp: 98.6 °F (37 °C) (01/07/22 0430)  Pulse: 73 (01/07/22 0430)  Resp: 12 (01/07/22 0430)  BP: 122/67 (01/07/22 0400)  SpO2: 99 % (01/07/22 0430) Vital Signs (24h Range):  Temp:  [96.62 °F (35.9 °C)-98.78 °F (37.1 °C)] 98.6 °F (37 °C)  Pulse:  [70-86] 73  Resp:  [7-25] 12  SpO2:  [99 %-100 %] 99 %  BP: (113-129)/(58-71) 122/67  Arterial Line BP: (110-148)/(57-80) 126/67     Weight: 66 kg (145 lb 8.1 oz)  Body mass index is 23.48 kg/m².      Intake/Output Summary (Last 24 hours) at 1/7/2022 0537  Last data filed at 1/7/2022 0400  Gross per 24 hour   Intake 3970.77 ml   Output 1325 ml   Net 2645.77 ml       Physical Exam  Constitutional:       General: He is not in acute distress.  HENT:      Head: Normocephalic.      Nose: Nose normal.      Mouth/Throat:      Mouth: Mucous membranes are dry.   Eyes:      Extraocular Movements: Extraocular movements intact.      Pupils: Pupils are equal, round, and reactive to light.   Neck:      Comments: Neck drains with light bloody  output  Free flap with good pulsation on doppler.   Cardiovascular:      Rate and Rhythm: Normal rate and regular rhythm.      Pulses: Normal pulses.      Heart sounds: Normal heart sounds.   Pulmonary:      Effort: Pulmonary effort is normal. No respiratory distress.      Breath sounds: Normal breath sounds.      Comments: Paul tube with trach collar   Abdominal:      General: Abdomen is flat. Bowel sounds are normal. There is no distension.      Palpations: Abdomen is soft.   Musculoskeletal:      Comments: RLE excision for flap with 1x drain in place   Skin:     General: Skin is warm and dry.   Neurological:      General: No focal deficit present.      Mental Status: He is alert.   Psychiatric:         Mood and Affect: Mood normal.         Vents:  Oxygen Concentration (%): 28 (01/07/22 0409)    Lines/Drains/Airways     Drain                 Gastrostomy/Enterostomy 12/27/21 1152 Percutaneous endoscopic gastrostomy (PEG) LUQ 10 days         Closed/Suction Drain 01/06/22 Right Thigh Bulb 15 Fr. 1 day         Closed/Suction Drain 01/06/22 Right;Anterior Thigh Bulb 15 Fr. 1 day         Closed/Suction Drain 01/06/22 1521 Right Neck Bulb 15 Fr. <1 day         Closed/Suction Drain 01/06/22 1524 Left Neck Bulb 15 Fr. <1 day         Urethral Catheter 01/06/22 0905 Non-latex 16 Fr. <1 day          Airway                 Laryngectomy (Do Not Remove) 01/06/22 1655 Laryngectomy tube <1 day          Arterial Line            Arterial Line 01/06/22 0840 Left Radial <1 day          Peripheral Intravenous Line                 Midline Catheter Insertion/Assessment  - Single Lumen 01/02/22 1410 Right basilic vein (medial side of arm) other (see comments) 4 days         Peripheral IV - Single Lumen 01/06/22 0853 18 G Right Hand <1 day                Significant Labs:    CBC/Anemia Profile:  Recent Labs   Lab 01/05/22 1953 01/05/22  1953 01/06/22  1700 01/06/22  1933 01/07/22  0332   WBC 13.31*   < > 18.55* 20.60* 22.67*   HGB 11.7*    < > 9.6* 9.4* 9.3*   HCT 35.6*   < > 30.3* 29.1* 29.2*   *  --  447 392  --    MCV 86   < > 88 87 88   RDW 13.2   < > 13.1 13.1 13.0    < > = values in this interval not displayed.        Chemistries:  Recent Labs   Lab 01/06/22  0311 01/06/22  1700 01/07/22  0332   * 133* 135*   K 4.4 4.7 5.0    102 109   CO2 25 22* 20*   BUN 37* 37* 33*   CREATININE 0.8 1.0 0.8   CALCIUM 10.9* 7.7* 6.8*   ALBUMIN 2.6*  --  1.9*   PROT 6.4  --  4.3*   BILITOT 0.9  --  0.6   ALKPHOS 200*  --  126   ALT 31  --  19   AST 14  --  36   MG 2.1 2.0 1.7   PHOS 3.0 4.4 3.8       All pertinent labs within the past 24 hours have been reviewed.    Significant Imaging:  I have reviewed all pertinent imaging results/findings within the past 24 hours.    Assessment/Plan:     * Larynx neoplasm malignant  70 yo M with a PMH of AFib, DM, HTN, and SCCa of the glottis/subglottis s/p IMRT (-10/30/21) who is admitted s/p total neck dissection, total thyroidectomy, laryngectomy and ALT free flap placement.  Patient is admitted HDS and on trach collar.       Neuro/Psych:   -- RASS , CAM-ICU  -- PRN tylenol, oxycodone 5mg solution, and breakthrough IV dilaudid      Cardiovascular:   #Atrial fibrillation   -- Continue home Diltiazem 30mg q6h   -- HOLD home eliquis for at least 2 days per ENT  -- Will discuss with ENT when it is ok to restart ASA    -- Hemodynamics:  -- MAP goal >65  -- d/c A line  -- Currently HDS on no pressor support     Pulmonary:   #S/p Laryngectomy and total neck dissection on 1/6/22  -- S/p trach on 12/27/21  -- wean trach collar as tolerated     Renal   -- Cr (Baseline): 0.7  -- BUN/Cr 37  BUN   Date Value Ref Range Status   01/07/2022 33 (H) 8 - 23 mg/dL Final   01/06/2022 37 (H) 8 - 23 mg/dL Final     Creatinine   Date Value Ref Range Status   01/07/2022 0.8 0.5 - 1.4 mg/dL Final   01/06/2022 1.0 0.5 - 1.4 mg/dL Final     -- follow UOP  -- d/c kalli     FEN / GI:   -- Strict NPO for now  -- HOLD TFs per  ENT  -- Ok to use G tube for medication use  -- No BM since Yassine per the patient.  Enema scheduled.      -- Electrolytes:   Recent Labs   Lab 01/07/22  0332   *   K 5.0      CO2 20*   BUN 33*   CREATININE 0.8   MG 1.7      -- Nutrition:   -- replace electrolytes as needed to keep K>4, Mg>2  -- bowel reg - daily miralax, docusate  -- famotidine for GI prophylaxis     ID:   -- Tm: afebrile  -- WBC , trend daily     Heme/Onc:   -- H/H stable  -- past products received: none  Lab Results   Component Value Date    WBC 22.67 (H) 01/07/2022    HGB 9.3 (L) 01/07/2022    HCT 29.2 (L) 01/07/2022    MCV 88 01/07/2022     01/06/2022        Endo:   #S/p thyroidectomy   -- Trend ionized Ca2+ q8h  -- continue 1500mg of Ca2+ q8h  -- continue calcitrol   -- continue levothyroxine    #DM2  -- endocrine following, appreciate recommendations  -- hold 10u determire qHs  -- continue MDSSI      PPx:   Feeding: Regular diet / NPO for now. Hold TFs per ENT  Analgesia/Sedation: none  Thromboembolic prevention:LVX / SCD  HOB 30  Stress Ulcer ppx: famotidine  Glucose control: Critical care goal 140-180 g/dl  Turn q2 hrs     Dispo/Code Status/Palliative:   -- continue care in the SICU    Anup Jose MD  [facility]        Critical care was time spent personally by me on the following activities: development of treatment plan with patient or surrogate and bedside caregivers, discussions with consultants, evaluation of patient's response to treatment, examination of patient, ordering and performing treatments and interventions, ordering and review of laboratory studies, ordering and review of radiographic studies, pulse oximetry, re-evaluation of patient's condition.  This critical care time did not overlap with that of any other provider or involve time for any procedures.     Anup Jose MD  Critical Care - Surgery  [facility]

## 2022-01-07 NOTE — ASSESSMENT & PLAN NOTE
BG goal 140-180    - Start  transition IV insulin infusion with stepdown parameters. Initial rate starting at 0.6 units/hr. Aprox 0.5 u/kg/d dosing.   - Low Dose SQ Insulin Correction Scale.  - BG monitoring every 4 hours while NPO.     ** Please call Endocrine for any BG related issues **  ** Please notify Endocrine for any change and/or advance in diet**    Lab Results   Component Value Date    HGBA1C 5.9 (H) 01/05/2022     Discharge Planning:   TBD. Please notify endocrinology prior to discharge.

## 2022-01-07 NOTE — SUBJECTIVE & OBJECTIVE
"Interval HPI:   Overnight events:   BG is above goal this am while on current SQ insulin regimen. Hyperglycemia most likely secondary to high dose steroid therapy. Endo will continue to follow, and manage glycemic control.      No diet orders on file  1 Day Post-Op     Eating:   NPO  Nausea: No  Hypoglycemia and intervention: No  Fever: No  TPN and/or TF: No   If yes, type of TF/TPN and rate: None    /63   Pulse 65   Temp 97.88 °F (36.6 °C)   Resp 13   Ht 5' 6" (1.676 m)   Wt 66 kg (145 lb 8.1 oz)   SpO2 100%   BMI 23.48 kg/m²     Labs Reviewed and Include    Recent Labs   Lab 01/07/22  0332   *   CALCIUM 6.8*   ALBUMIN 1.9*   PROT 4.3*   *   K 5.0   CO2 20*      BUN 33*   CREATININE 0.8   ALKPHOS 126   ALT 19   AST 36   BILITOT 0.6     Lab Results   Component Value Date    WBC 22.67 (H) 01/07/2022    HGB 9.3 (L) 01/07/2022    HCT 29.2 (L) 01/07/2022    MCV 88 01/07/2022     (H) 01/07/2022     No results for input(s): TSH, FREET4 in the last 168 hours.  Lab Results   Component Value Date    HGBA1C 5.9 (H) 01/05/2022       Nutritional status:   Body mass index is 23.48 kg/m².  Lab Results   Component Value Date    ALBUMIN 1.9 (L) 01/07/2022    ALBUMIN 2.6 (L) 01/06/2022    ALBUMIN 2.8 (L) 01/05/2022     No results found for: PREALBUMIN    Estimated Creatinine Clearance: 77.5 mL/min (based on SCr of 0.8 mg/dL).    Accu-Checks  Recent Labs     01/05/22  0847 01/05/22  1210 01/05/22  1701 01/05/22  2048 01/05/22  2353 01/06/22  0421 01/06/22  0805 01/06/22  1649 01/06/22  2224 01/07/22  0331   POCTGLUCOSE 187* 176* 239* 176* 220* 172* 161* 254* 202* 261*       Current Medications and/or Treatments Impacting Glycemic Control  Immunotherapy:    Immunosuppressants     None        Steroids:   Hormones (From admission, onward)            None        Pressors:    Autonomic Drugs (From admission, onward)            None        Hyperglycemia/Diabetes Medications:   Antihyperglycemics (From " admission, onward)            None

## 2022-01-07 NOTE — PLAN OF CARE
[facility]  Discharge Reassessment    Primary Care Provider: Diana Calhoun MD    Expected Discharge Date: 1/14/2022    Reassessment (most recent)     Discharge Reassessment - 01/07/22 0857        Discharge Reassessment    Assessment Type Discharge Planning Reassessment     Communicated CHIARA with patient/caregiver Date not available/Unable to determine     Discharge Plan A Home     Discharge Plan B Home with family     DME Needed Upon Discharge  none     Discharge Barriers Identified None     Why the patient remains in the hospital Requires continued medical care        Post-Acute Status    Post-Acute Authorization Placement     Post-Acute Placement Status Pending medical clearance/testing                Per MD's note, - NAEON  - pain control with PO oxycodone and a dose of breakthrough IV dilaudid  - replaced lytes overnight  - d/c mahan and a line    The patient went to OR 1-6-22 for Laryngectomy. The patient is currently in the SICU and continues to require medical care. The SW will continue to follow -up with the patient to assist with discharge planning.           Ronan Aparicio LMSW  Case Management [facility]  Ext: [phone]

## 2022-01-07 NOTE — CONSULTS
Consult received for TFs. Pt being followed, please see full assessment on 1/5.    S/p laryngectomy, neck dissection, free flap creation, thyroidectomy, and parathyroid reimplantation.     Rec Glucerna 1.5 advancing as tolerated until goal of 50 mL/hr providing pt with 1800 kcal, 99 g protein, and 911 mL free water      Please re-consult as needed.    Thanks!

## 2022-01-07 NOTE — HOSPITAL COURSE
Admitted to the SICU following TND, total thyroidectomy, laryngectomy and free flap for SCC of glottis/ subglottis on 1/6/22.   Doing well post operatively.  Pain well controlled.  TF advance to goal.  Deescalated flap checks to q2hr.

## 2022-01-07 NOTE — PHYSICIAN QUERY
PT Name: Cyrus Baters Jr.  MR #: [id number]    DOCUMENTATION CLARIFICATION     CDS/: Ana ABREU, RN              Contact information: [email or web address]  This form is a permanent document in the medical record.     Query Date: January 7, 2022    By submitting this query, we are merely seeking further clarification of documentation.  Please utilize your independent clinical judgment when addressing the question(s) below.    The medical record contains the following:  Pathology Findings Location in Medical Record      LARYNX, CRICOID, BIOPSY:   Benign bone and cartilage with attached soft tissue showing focal respiratory mucosa with seromucinous glands.   Negative for malignancy.     Final Pathologic Diagnosis  - specimen collected 12/27/21       Please clarify:  [  ] Pathology findings noted above are ruled in/confirmed as diagnoses   [ ] Pathology findings noted above are not confirmed as diagnoses   [  X] Pathology findings noted above are incidental   [  X] Other diagnosis (please specify): __He has known larynx cancer. This was just tissue below the tumor. No impact on coding or hospital stay________   [  ] Clinically Undetermined       Please document in your progress notes daily for the duration of treatment until resolved and include in your discharge summary.    Form No. [id number]

## 2022-01-07 NOTE — PROGRESS NOTES
[facility]  Endocrinology  Progress Note    Admit Date: 12/25/2021     Reason for Consult: Management of T2DM, Hyperglycemia     Surgical Procedure and Date:   12/27/21  CREATION, TRACHEOSTOMY (N/A)  LARYNGOSCOPY, DIRECT, WITH BRONCHOSCOPY (N/A)  INSERTION, PEG TUBE (N/A)     Diabetes diagnosis year: 2010    Home Diabetes Medications:  Ozempic stopped on 10/18/21 by Dena Silveira as a1c below goal     How often checking glucose at home?  Once daily    BG readings on regimen: 150s  Hypoglycemia on the regimen?  No  Missed doses on regimen?  n/a    Diabetes Complications include:     None     Complicating diabetes co morbidities:   pAfib, HTN, active cancer       HPI:   Patient is a 71 y.o. 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 He presented as a transfer from [place] for ENT evaluation. He initially presented to the ER for worsening hemoptysis. Laryngeal videostroboscopy 11/8/21 notable for webbing across membranous vocal folds, granular changes infraglottically, firbinous debris across anterior commissure, post surgical stiffness of bilateral true vocal folds, phonatory supraglottic hyperfunction, occasional plica ventricularis, thick salivary secretions, pooled in pyriforms, diffuse mild laryngopharyngeal edema.He is now s/p the above procedure. He was last seen by MISSY Gutierrez on 10/18/21 for DM management. Endocrinology consulted for management of T2DM.            Lab Results   Component Value Date    HGBA1C 5.5 10/05/2021           Interval HPI:   Overnight events:   BG is above goal this am while on current SQ insulin regimen. Hyperglycemia most likely secondary to high dose steroid therapy. Endo will continue to follow, and manage glycemic control.      No diet orders on file  1 Day Post-Op     Eating:   NPO  Nausea: No  Hypoglycemia and intervention: No  Fever: No  TPN and/or TF: No  "  If yes, type of TF/TPN and rate: None    /63   Pulse 65   Temp 97.88 °F (36.6 °C)   Resp 13   Ht 5' 6" (1.676 m)   Wt 66 kg (145 lb 8.1 oz)   SpO2 100%   BMI 23.48 kg/m²     Labs Reviewed and Include    Recent Labs   Lab 01/07/22  0332   *   CALCIUM 6.8*   ALBUMIN 1.9*   PROT 4.3*   *   K 5.0   CO2 20*      BUN 33*   CREATININE 0.8   ALKPHOS 126   ALT 19   AST 36   BILITOT 0.6     Lab Results   Component Value Date    WBC 22.67 (H) 01/07/2022    HGB 9.3 (L) 01/07/2022    HCT 29.2 (L) 01/07/2022    MCV 88 01/07/2022     (H) 01/07/2022     No results for input(s): TSH, FREET4 in the last 168 hours.  Lab Results   Component Value Date    HGBA1C 5.9 (H) 01/05/2022       Nutritional status:   Body mass index is 23.48 kg/m².  Lab Results   Component Value Date    ALBUMIN 1.9 (L) 01/07/2022    ALBUMIN 2.6 (L) 01/06/2022    ALBUMIN 2.8 (L) 01/05/2022     No results found for: PREALBUMIN    Estimated Creatinine Clearance: 77.5 mL/min (based on SCr of 0.8 mg/dL).    Accu-Checks  Recent Labs     01/05/22  0847 01/05/22  1210 01/05/22  1701 01/05/22  2048 01/05/22  2353 01/06/22  0421 01/06/22  0805 01/06/22  1649 01/06/22  2224 01/07/22  0331   POCTGLUCOSE 187* 176* 239* 176* 220* 172* 161* 254* 202* 261*       Current Medications and/or Treatments Impacting Glycemic Control  Immunotherapy:    Immunosuppressants     None        Steroids:   Hormones (From admission, onward)            None        Pressors:    Autonomic Drugs (From admission, onward)            None        Hyperglycemia/Diabetes Medications:   Antihyperglycemics (From admission, onward)            None          ASSESSMENT and PLAN    * Larynx neoplasm malignant  Managed per primary team  Optimize BG control        Type 2 diabetes mellitus with hyperglycemia, without long-term current use of insulin  BG goal 140-180    - Start  transition IV insulin infusion with stepdown parameters. Initial rate starting at 0.6 units/hr. " Aprox 0.5 u/kg/d dosing.   - Low Dose SQ Insulin Correction Scale.  - BG monitoring every 4 hours while NPO.     11:49 AM  - BG trending downwards prior to start of insulin infusion.   - Decrease  transition IV insulin infusion with stepdown parameters. Initial rate starting at 0.4 units/hr.     ** Please call Endocrine for any BG related issues **  ** Please notify Endocrine for any change and/or advance in diet**    Lab Results   Component Value Date    HGBA1C 5.9 (H) 01/05/2022     Discharge Planning:   TBD. Please notify endocrinology prior to discharge.         Hyperlipidemia  Managed per primary team  Condition may cause insulin resistance         Paroxysmal atrial fibrillation  Managed per primary team  Condition may cause insulin resistance         Adverse effect of adrenal cortical steroids, sequela  On steroid therapy per transplant team; may elevate BG readings                 Vargas Cortez NP  Endocrinology  [facility]

## 2022-01-07 NOTE — PROGRESS NOTES
ENT 6 Hour Post-Anastamosis Flap Check      Flap color, turgor and triphasic doppler signal intact. Convalescing appropriately. Continue Q1hr flap checks.    Donor site appropriately tender, no fluctuance or edema.      CINDY Herrera MD PGY-4  [facility]  01/06/2022 10:40 PM

## 2022-01-07 NOTE — SUBJECTIVE & OBJECTIVE
Interval History: Did well overnight. No flap issues.     Medications:  Continuous Infusions:   sodium chloride 0.9% 50 mL/hr at 01/07/22 0600     Scheduled Meds:   ampicillin-sulbactim (UNASYN) IVPB  3 g Intravenous Q6H    calcitrioL  0.5 mcg Per G Tube BID    calcium carbonate  1,500 mg Per G Tube TID    diltiaZEM  30 mg Per G Tube Q6H    enoxaparin  40 mg Subcutaneous Daily    famotidine  20 mg Per G Tube BID    insulin detemir U-100  10 Units Subcutaneous QHS    levothyroxine  112 mcg Per G Tube Before breakfast    senna-docusate 8.6-50 mg  1 tablet Per G Tube BID     PRN Meds:acetaminophen, HYDROmorphone, insulin aspart U-100, magnesium oxide, magnesium oxide, oxyCODONE, oxyCODONE, potassium bicarbonate, potassium bicarbonate, potassium bicarbonate, potassium, sodium phosphates, potassium, sodium phosphates, potassium, sodium phosphates, sodium chloride 0.9%     Review of patient's allergies indicates:   Allergen Reactions    Pollen extracts     Lovastatin Rash     Not confirmed but pt skeptical     Objective:     Vital Signs (24h Range):  Temp:  [96.62 °F (35.9 °C)-98.78 °F (37.1 °C)] 98.42 °F (36.9 °C)  Pulse:  [67-86] 67  Resp:  [7-25] 13  SpO2:  [99 %-100 %] 100 %  BP: (101-129)/(57-71) 101/57  Arterial Line BP: (110-148)/(52-80) 117/52       Lines/Drains/Airways     Drain                 Gastrostomy/Enterostomy 12/27/21 1152 Percutaneous endoscopic gastrostomy (PEG) LUQ 10 days         Closed/Suction Drain 01/06/22 Right Thigh Bulb 15 Fr. 1 day         Closed/Suction Drain 01/06/22 Right;Anterior Thigh Bulb 15 Fr. 1 day         Closed/Suction Drain 01/06/22 1521 Right Neck Bulb 15 Fr. <1 day         Closed/Suction Drain 01/06/22 1524 Left Neck Bulb 15 Fr. <1 day         Urethral Catheter 01/06/22 0905 Non-latex 16 Fr. <1 day          Airway                 Laryngectomy (Do Not Remove) 01/06/22 1655 Laryngectomy tube <1 day          Arterial Line            Arterial Line 01/06/22 0840 Left  Radial <1 day          Peripheral Intravenous Line                 Midline Catheter Insertion/Assessment  - Single Lumen 01/02/22 1410 Right basilic vein (medial side of arm) other (see comments) 4 days         Peripheral IV - Single Lumen 01/06/22 0853 18 G Right Hand <1 day                Physical Exam  Awake, alert  EOMI  Neck - soft, incision c/d/i bilaterally. Flap skin paddle with good color, doppler, turgor. Bilateral neck drains serosanguinous. R - 20 L- 59. Stoma intact, mp tube in place.  RLE - island dressing intact, not saturated. GLENN x 2 in place - 4, 17cc SS output    Significant Labs:  BMP:   Recent Labs   Lab 01/07/22  0332   *      CO2 20*   BUN 33*   CREATININE 0.8   CALCIUM 6.8*   MG 1.7     CBC:   Recent Labs   Lab 01/07/22  0332   WBC 22.67*   RBC 3.32*   HGB 9.3*   HCT 29.2*   *   MCV 88   MCH 28.0   MCHC 31.8*       Significant Diagnostics:  I have reviewed and interpreted all pertinent imaging results/findings within the past 24 hours.

## 2022-01-07 NOTE — ASSESSMENT & PLAN NOTE
"- Continue ICU care   Continue q1h flap checks: Record Doppler Pulses (artery and vein) ,Capillary Refill , Color, Turgor, temperature.   - Neurovascular Checks q1h of bilateral upper and lower extremities   - Keep head elevated and in neutral position  - Sign above bed that reads "No circumferential Neck Ties"   - FOR ANY FLAP ISSUES, PLEASE PHONE ENT RESIDENT ON CALL.  - Please label drains carefully and document accordingly  - Maintain xeroform in left ear canal  - Bacitracin twice daily to incision line     Neuro:  - Alert, oriented. Pain medication PRN.      CV:  - HDS. SBP > 90. A-line can be removed  - continue with q1h flap checks     Pulm:  - stable     GI/FEN/Lytes:  - Strict NPO  - monitor calcium closely and replete prn  - start trickle TFs tomorrow  - replace lytes prn     Renal:  - UOP adequate. Juarez out.      Heme/ID:  - cont to trend HH  - cont Unasyn     Endo:  - Q6 acuchecks, SSI.      PPX:  PT/OT  L40  PPI     Dispo:   Continue ICU care for 48 hours for q1h flap checks                  "

## 2022-01-07 NOTE — PLAN OF CARE
This plan of care note serves as the attestation for the resident's progress note. Please link this note to the resident's progress note.    Critical Care issues in this patient include:    1. Larynx neoplasm malignant  2. Paroxysmal atrial fibrillation  3. Nonsustained ventricular tachycardia  4. Hyperlipidemia  5. Type 2 diabetes mellitus with long term current use of insulin    Patient admitted to ICU in hemodynamically stable condition. Will restart appropriate home medications today. Will discuss with primary team in re: start tube feeds. Will get dietary involved for tube feeds recommendations. Watch calcium closely; hypocalcemic this morning which is being corrected. Discontinue mahan and a-line today. Needs to be out of bed to chair and work with PT/OT. Will re-consult endocrinology for his hyperglycemia. Start on GI and DVT prophylaxis. He is on aspirin and eliquis at home; will need ASA restarted and can switch lovenox to eliquis if ENT ok with that. Will give enema since patient has not had a BM for a week. Continue ICU care.    Critical care time spent: 35 min    Critical care was time spent personally by me on the following activities: development of treatment plan with patient or surrogate and bedside caregivers, discussions with consultants, evaluation of patient's response to treatment, examination of patient, ordering and performing treatments and interventions, ordering and review of laboratory studies, ordering and review of radiographic studies, pulse oximetry, re-evaluation of patient's condition.  This critical care time did not overlap with that of any other provider or involve time for any procedures.      Tamanna Piña MD  General Surgery and Surgical Critical Care  [facility]

## 2022-01-07 NOTE — ASSESSMENT & PLAN NOTE
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  Patient is admitted HDS and on trach collar.       Neuro/Psych:   -- RASS , CAM-ICU  -- PRN tylenol, oxycodone 5mg solution, and breakthrough IV dilaudid      Cardiovascular:   #Atrial fibrillation   -- Continue home Diltiazem 30mg q6h   -- HOLD home eliquis for at least 2 days per ENT  -- Will discuss with ENT when it is ok to restart ASA    -- Hemodynamics:  -- MAP goal >65  -- d/c A line  -- Currently HDS on no pressor support     Pulmonary:   #S/p Laryngectomy and total neck dissection on 1/6/22  -- S/p trach on 12/27/21  -- wean trach collar as tolerated     Renal   -- Cr (Baseline): 0.7  -- BUN/Cr 37  BUN   Date Value Ref Range Status   01/07/2022 33 (H) 8 - 23 mg/dL Final   01/06/2022 37 (H) 8 - 23 mg/dL Final     Creatinine   Date Value Ref Range Status   01/07/2022 0.8 0.5 - 1.4 mg/dL Final   01/06/2022 1.0 0.5 - 1.4 mg/dL Final     -- follow UOP  -- d/c mahan     FEN / GI:   -- Strict NPO for now  -- HOLD TFs per ENT  -- Ok to use G tube for medication use  -- No BM since [place] per the patient.  Enema scheduled.      -- Electrolytes:   Recent Labs   Lab 01/07/22  0332   *   K 5.0      CO2 20*   BUN 33*   CREATININE 0.8   MG 1.7      -- Nutrition:   -- replace electrolytes as needed to keep K>4, Mg>2  -- bowel reg - daily miralax, docusate  -- famotidine for GI prophylaxis     ID:   -- Tm: afebrile  -- WBC , trend daily     Heme/Onc:   -- H/H stable  -- past products received: none  Lab Results   Component Value Date    WBC 22.67 (H) 01/07/2022    HGB 9.3 (L) 01/07/2022    HCT 29.2 (L) 01/07/2022    MCV 88 01/07/2022     01/06/2022        Endo:   #S/p thyroidectomy   -- Trend ionized Ca2+ q8h  -- continue 1500mg of Ca2+ q8h  -- continue calcitrol   -- continue  levothyroxine    #DM2  -- endocrine following, appreciate recommendations  -- hold 10u determire qHs  -- continue MDSSI      PPx:   Feeding: Regular diet / NPO for now. Hold TFs per ENT  Analgesia/Sedation: none  Thromboembolic prevention:LVX / SCD  HOB 30  Stress Ulcer ppx: famotidine  Glucose control: Critical care goal 140-180 g/dl  Turn q2 hrs     Dispo/Code Status/Palliative:   -- continue care in the SICU    Anup Jose MD  [facility]

## 2022-01-07 NOTE — PROGRESS NOTES
"      SICU PLAN OF CARE NOTE    Dx: Larynx neoplasm malignant    Shift Events: insulin gtt started. Calcium replacement. Juarez are arterial line d'cd.    Neuro: AAO x4    Vital Signs: BP (!) 113/58   Pulse 75   Temp 98.2 °F (36.8 °C) (Oral)   Resp 15   Ht 5' 6" (1.676 m)   Wt 66 kg (145 lb 8.1 oz)   SpO2 99%   BMI 23.48 kg/m²     Respiratory: Trach Collar    Diet: NPO    Gtts: Insulin    Urine Output: total 475 of shift. Due to void at 2000.    Drains: Drains with minimal output.      "

## 2022-01-08 LAB
ANION GAP SERPL CALC-SCNC: 8 MMOL/L (ref 8–16)
BASOPHILS # BLD AUTO: 0.02 K/UL (ref 0–0.2)
BASOPHILS NFR BLD: 0.1 % (ref 0–1.9)
BUN SERPL-MCNC: 24 MG/DL (ref 8–23)
CA-I BLDV-SCNC: 1.05 MMOL/L (ref 1.06–1.42)
CA-I BLDV-SCNC: 1.12 MMOL/L (ref 1.06–1.42)
CALCIUM SERPL-MCNC: 7.5 MG/DL (ref 8.7–10.5)
CHLORIDE SERPL-SCNC: 103 MMOL/L (ref 95–110)
CO2 SERPL-SCNC: 24 MMOL/L (ref 23–29)
CREAT SERPL-MCNC: 0.7 MG/DL (ref 0.5–1.4)
DIFFERENTIAL METHOD: ABNORMAL
EOSINOPHIL # BLD AUTO: 0 K/UL (ref 0–0.5)
EOSINOPHIL NFR BLD: 0.1 % (ref 0–8)
ERYTHROCYTE [DISTWIDTH] IN BLOOD BY AUTOMATED COUNT: 13.4 % (ref 11.5–14.5)
EST. GFR  (AFRICAN AMERICAN): >60 ML/MIN/1.73 M^2
EST. GFR  (NON AFRICAN AMERICAN): >60 ML/MIN/1.73 M^2
GLUCOSE SERPL-MCNC: 140 MG/DL (ref 70–110)
HCT VFR BLD AUTO: 29.7 % (ref 40–54)
HGB BLD-MCNC: 9.7 G/DL (ref 14–18)
IMM GRANULOCYTES # BLD AUTO: 0.13 K/UL (ref 0–0.04)
IMM GRANULOCYTES NFR BLD AUTO: 0.9 % (ref 0–0.5)
LYMPHOCYTES # BLD AUTO: 0.7 K/UL (ref 1–4.8)
LYMPHOCYTES NFR BLD: 4.9 % (ref 18–48)
MAGNESIUM SERPL-MCNC: 1.8 MG/DL (ref 1.6–2.6)
MCH RBC QN AUTO: 28.3 PG (ref 27–31)
MCHC RBC AUTO-ENTMCNC: 32.7 G/DL (ref 32–36)
MCV RBC AUTO: 87 FL (ref 82–98)
MONOCYTES # BLD AUTO: 0.7 K/UL (ref 0.3–1)
MONOCYTES NFR BLD: 4.8 % (ref 4–15)
NEUTROPHILS # BLD AUTO: 12.3 K/UL (ref 1.8–7.7)
NEUTROPHILS NFR BLD: 89.2 % (ref 38–73)
NRBC BLD-RTO: 0 /100 WBC
PHOSPHATE SERPL-MCNC: 3.3 MG/DL (ref 2.7–4.5)
PLATELET # BLD AUTO: 441 K/UL (ref 150–450)
PMV BLD AUTO: 9.4 FL (ref 9.2–12.9)
POCT GLUCOSE: 133 MG/DL (ref 70–110)
POCT GLUCOSE: 137 MG/DL (ref 70–110)
POCT GLUCOSE: 139 MG/DL (ref 70–110)
POCT GLUCOSE: 142 MG/DL (ref 70–110)
POCT GLUCOSE: 146 MG/DL (ref 70–110)
POCT GLUCOSE: 154 MG/DL (ref 70–110)
POCT GLUCOSE: 161 MG/DL (ref 70–110)
POTASSIUM SERPL-SCNC: 4.1 MMOL/L (ref 3.5–5.1)
RBC # BLD AUTO: 3.43 M/UL (ref 4.6–6.2)
SODIUM SERPL-SCNC: 135 MMOL/L (ref 136–145)
WBC # BLD AUTO: 13.85 K/UL (ref 3.9–12.7)

## 2022-01-08 PROCEDURE — 63700000 PHARM REV CODE 250 ALT 637 W/O HCPCS: Mod: HCNC | Performed by: STUDENT IN AN ORGANIZED HEALTH CARE EDUCATION/TRAINING PROGRAM

## 2022-01-08 PROCEDURE — 83735 ASSAY OF MAGNESIUM: CPT | Mod: HCNC | Performed by: STUDENT IN AN ORGANIZED HEALTH CARE EDUCATION/TRAINING PROGRAM

## 2022-01-08 PROCEDURE — 63600175 PHARM REV CODE 636 W HCPCS: Mod: HCNC | Performed by: STUDENT IN AN ORGANIZED HEALTH CARE EDUCATION/TRAINING PROGRAM

## 2022-01-08 PROCEDURE — 25000003 PHARM REV CODE 250: Mod: HCNC | Performed by: STUDENT IN AN ORGANIZED HEALTH CARE EDUCATION/TRAINING PROGRAM

## 2022-01-08 PROCEDURE — 20000000 HC ICU ROOM: Mod: HCNC

## 2022-01-08 PROCEDURE — 80048 BASIC METABOLIC PNL TOTAL CA: CPT | Mod: HCNC | Performed by: STUDENT IN AN ORGANIZED HEALTH CARE EDUCATION/TRAINING PROGRAM

## 2022-01-08 PROCEDURE — 99232 SBSQ HOSP IP/OBS MODERATE 35: CPT | Mod: HCNC,,, | Performed by: NURSE PRACTITIONER

## 2022-01-08 PROCEDURE — 27000221 HC OXYGEN, UP TO 24 HOURS: Mod: HCNC

## 2022-01-08 PROCEDURE — 99232 PR SUBSEQUENT HOSPITAL CARE,LEVL II: ICD-10-PCS | Mod: HCNC,,, | Performed by: NURSE PRACTITIONER

## 2022-01-08 PROCEDURE — 25000242 PHARM REV CODE 250 ALT 637 W/ HCPCS: Mod: HCNC | Performed by: STUDENT IN AN ORGANIZED HEALTH CARE EDUCATION/TRAINING PROGRAM

## 2022-01-08 PROCEDURE — 94761 N-INVAS EAR/PLS OXIMETRY MLT: CPT | Mod: HCNC

## 2022-01-08 PROCEDURE — 82330 ASSAY OF CALCIUM: CPT | Mod: HCNC | Performed by: STUDENT IN AN ORGANIZED HEALTH CARE EDUCATION/TRAINING PROGRAM

## 2022-01-08 PROCEDURE — 99900035 HC TECH TIME PER 15 MIN (STAT): Mod: HCNC

## 2022-01-08 PROCEDURE — 84100 ASSAY OF PHOSPHORUS: CPT | Mod: HCNC | Performed by: STUDENT IN AN ORGANIZED HEALTH CARE EDUCATION/TRAINING PROGRAM

## 2022-01-08 PROCEDURE — 99291 PR CRITICAL CARE, E/M 30-74 MINUTES: ICD-10-PCS | Mod: HCNC,,, | Performed by: STUDENT IN AN ORGANIZED HEALTH CARE EDUCATION/TRAINING PROGRAM

## 2022-01-08 PROCEDURE — 82330 ASSAY OF CALCIUM: CPT | Mod: 91,HCNC | Performed by: STUDENT IN AN ORGANIZED HEALTH CARE EDUCATION/TRAINING PROGRAM

## 2022-01-08 PROCEDURE — 85025 COMPLETE CBC W/AUTO DIFF WBC: CPT | Mod: HCNC | Performed by: STUDENT IN AN ORGANIZED HEALTH CARE EDUCATION/TRAINING PROGRAM

## 2022-01-08 PROCEDURE — 99291 CRITICAL CARE FIRST HOUR: CPT | Mod: HCNC,,, | Performed by: STUDENT IN AN ORGANIZED HEALTH CARE EDUCATION/TRAINING PROGRAM

## 2022-01-08 RX ORDER — SENNOSIDES 8.8 MG/5ML
5 LIQUID ORAL DAILY
Status: DISCONTINUED | OUTPATIENT
Start: 2022-01-08 | End: 2022-01-08

## 2022-01-08 RX ORDER — DOCUSATE SODIUM 50 MG/5ML
100 LIQUID ORAL 2 TIMES DAILY
Status: DISCONTINUED | OUTPATIENT
Start: 2022-01-08 | End: 2022-01-08

## 2022-01-08 RX ORDER — DOCUSATE SODIUM 50 MG/5ML
100 LIQUID ORAL 2 TIMES DAILY
Status: DISCONTINUED | OUTPATIENT
Start: 2022-01-08 | End: 2022-01-14 | Stop reason: HOSPADM

## 2022-01-08 RX ORDER — SENNOSIDES 8.8 MG/5ML
5 LIQUID ORAL DAILY
Status: DISCONTINUED | OUTPATIENT
Start: 2022-01-08 | End: 2022-01-14 | Stop reason: HOSPADM

## 2022-01-08 RX ADMIN — FAMOTIDINE 20 MG: 10 INJECTION, SOLUTION INTRAVENOUS at 08:01

## 2022-01-08 RX ADMIN — LEVOTHYROXINE SODIUM 112 MCG: 112 TABLET ORAL at 07:01

## 2022-01-08 RX ADMIN — CALCITRIOL 0.5 MCG: 1 SOLUTION ORAL at 08:01

## 2022-01-08 RX ADMIN — CALCIUM CARBONATE 1500 MG: 1250 SUSPENSION ORAL at 08:01

## 2022-01-08 RX ADMIN — DOCUSATE SODIUM 100 MG: 50 LIQUID ORAL at 08:01

## 2022-01-08 RX ADMIN — CALCIUM CARBONATE 1500 MG: 1250 SUSPENSION ORAL at 02:01

## 2022-01-08 RX ADMIN — Medication 800 MG: at 04:01

## 2022-01-08 RX ADMIN — DOCUSATE SODIUM 100 MG: 50 LIQUID ORAL at 02:01

## 2022-01-08 RX ADMIN — CALCITRIOL 0.5 MCG: 1 SOLUTION ORAL at 11:01

## 2022-01-08 RX ADMIN — AMPICILLIN SODIUM AND SULBACTAM SODIUM 3 G: 2; 1 INJECTION, POWDER, FOR SOLUTION INTRAMUSCULAR; INTRAVENOUS at 02:01

## 2022-01-08 RX ADMIN — ENOXAPARIN SODIUM 40 MG: 100 INJECTION SUBCUTANEOUS at 05:01

## 2022-01-08 RX ADMIN — OXYCODONE HYDROCHLORIDE 10 MG: 5 SOLUTION ORAL at 02:01

## 2022-01-08 RX ADMIN — AMPICILLIN SODIUM AND SULBACTAM SODIUM 3 G: 2; 1 INJECTION, POWDER, FOR SOLUTION INTRAMUSCULAR; INTRAVENOUS at 10:01

## 2022-01-08 RX ADMIN — SENNOSIDES 5 ML: 8.8 SYRUP ORAL at 05:01

## 2022-01-08 RX ADMIN — INSULIN HUMAN 0.2 UNITS/HR: 1 INJECTION, SOLUTION INTRAVENOUS at 05:01

## 2022-01-08 RX ADMIN — INSULIN ASPART 1 UNITS: 100 INJECTION, SOLUTION INTRAVENOUS; SUBCUTANEOUS at 11:01

## 2022-01-08 RX ADMIN — AMPICILLIN SODIUM AND SULBACTAM SODIUM 3 G: 2; 1 INJECTION, POWDER, FOR SOLUTION INTRAMUSCULAR; INTRAVENOUS at 07:01

## 2022-01-08 NOTE — ASSESSMENT & PLAN NOTE
BG goal 140-180    - Rewrite transition IV insulin infusion with stepdown parameters. Initial rate starting at 0.2 units/hr.   - Low Dose SQ Insulin Correction Scale.  - BG monitoring every 4 hours while NPO.     ** Please call Endocrine for any BG related issues **  ** Please notify Endocrine for any change and/or advance in diet**    Lab Results   Component Value Date    HGBA1C 5.9 (H) 01/05/2022     Discharge Planning:   TBD. Please notify endocrinology prior to discharge.

## 2022-01-08 NOTE — SUBJECTIVE & OBJECTIVE
Interval History: No issues overnight. Pain controlled. OOB. Flap checks appropriate. Minimal stomal crusting.     Medications:  Continuous Infusions:   insulin regular 1 units/mL infusion orderable (TRANSFER) 0.2 Units/hr (01/08/22 0900)     Scheduled Meds:   ampicillin-sulbactim (UNASYN) IVPB  3 g Intravenous Q8H    calcitrioL  0.5 mcg Per G Tube BID    calcium carbonate  1,500 mg Per G Tube TID    enoxaparin  40 mg Subcutaneous Daily    famotidine (PF)  20 mg Intravenous BID    levothyroxine  112 mcg Per G Tube Before breakfast    sodium phosphates  1 enema Rectal Once     PRN Meds:dextrose 50%, dextrose 50%, glucagon (human recombinant), glucose, glucose, HYDROmorphone, insulin aspart U-100, magnesium oxide, magnesium oxide, oxyCODONE, oxyCODONE, potassium bicarbonate, potassium bicarbonate, potassium bicarbonate, potassium, sodium phosphates, potassium, sodium phosphates, potassium, sodium phosphates     Review of patient's allergies indicates:   Allergen Reactions    Pollen extracts     Lovastatin Rash     Not confirmed but pt skeptical     Objective:     Vital Signs (24h Range):  Temp:  [97.88 °F (36.6 °C)-99.3 °F (37.4 °C)] 98.8 °F (37.1 °C)  Pulse:  [65-88] 78  Resp:  [12-22] 18  SpO2:  [97 %-100 %] 97 %  BP: (113-141)/(58-75) 135/68     Date 01/08/22 0700 - 01/09/22 0659   Shift 7022-2622 1109-5706 5845-9770 24 Hour Total   INTAKE   I.V.(mL/kg) 0.4(0)   0.4(0)   Shift Total(mL/kg) 0.4(0)   0.4(0)   OUTPUT   Urine(mL/kg/hr) 230   230   Shift Total(mL/kg) 230(3.5)   230(3.5)   Weight (kg) 66 66 66 66     Lines/Drains/Airways     Drain                 Gastrostomy/Enterostomy 12/27/21 1152 Percutaneous endoscopic gastrostomy (PEG) LUQ 11 days         Closed/Suction Drain 01/06/22 Right Thigh Bulb 15 Fr. 2 days         Closed/Suction Drain 01/06/22 Right;Anterior Thigh Bulb 15 Fr. 2 days         Closed/Suction Drain 01/06/22 1521 Right Neck Bulb 15 Fr. 1 day         Closed/Suction Drain 01/06/22 1524  Left Neck Bulb 15 Fr. 1 day          Airway                 Laryngectomy (Do Not Remove) 01/06/22 1655 Laryngectomy tube 1 day          Peripheral Intravenous Line                 Midline Catheter Insertion/Assessment  - Single Lumen 01/02/22 1410 Right basilic vein (medial side of arm) other (see comments) 5 days         Peripheral IV - Single Lumen 01/06/22 0853 18 G Right Hand 2 days                Physical Exam  Awake, alert, nad  EOMI  Neck - soft, incision c/d/i bilaterally  Flap - good color/turgor, strong triphasic doppler, cap refill appropriate  GLENN neck and leg with ss output  AAOx3, communicates with gestures    Significant Labs:  CBC:   Recent Labs   Lab 01/08/22  0218   WBC 13.85*   RBC 3.43*   HGB 9.7*   HCT 29.7*      MCV 87   MCH 28.3   MCHC 32.7     CMP:   Recent Labs   Lab 01/07/22  0332 01/07/22  0332 01/08/22  0218   *   < > 140*   CALCIUM 6.8*   < > 7.5*   ALBUMIN 1.9*  --   --    PROT 4.3*  --   --    *   < > 135*   K 5.0   < > 4.1   CO2 20*   < > 24      < > 103   BUN 33*   < > 24*   CREATININE 0.8   < > 0.7   ALKPHOS 126  --   --    ALT 19  --   --    AST 36  --   --    BILITOT 0.6  --   --     < > = values in this interval not displayed.       Significant Diagnostics:  I have reviewed and interpreted all pertinent imaging results/findings within the past 24 hours.

## 2022-01-08 NOTE — SUBJECTIVE & OBJECTIVE
Interval History/Significant Events:  - NAEO, flap checks stable  - pain control with PO oxycodone and a dose of breakthrough IV dilaudid  - Hypocalcemia improved from yesterday. Continue current regimen.    Follow-up For: Procedure(s) (LRB):  LARYNGECTOMY (N/A)  DISSECTION, NECK (Bilateral)  CREATION, FREE FLAP (Right)  THYROIDECTOMY  LARYNGOSCOPY (N/A)  REIMPLANTATION, PARATHYROID TISSUE (N/A)    Post-Operative Day: 2 Days Post-Op    Objective:     Vital Signs (Most Recent):  Temp: 99.2 °F (37.3 °C) (01/08/22 0300)  Pulse: 73 (01/08/22 0500)  Resp: 20 (01/08/22 0339)  BP: 117/68 (01/08/22 0500)  SpO2: 99 % (01/08/22 0500) Vital Signs (24h Range):  Temp:  [97.88 °F (36.6 °C)-99.3 °F (37.4 °C)] 99.2 °F (37.3 °C)  Pulse:  [64-88] 73  Resp:  [12-22] 20  SpO2:  [98 %-100 %] 99 %  BP: (109-141)/(56-75) 117/68  Arterial Line BP: ()/(54-83) 93/83     Weight: 66 kg (145 lb 8.1 oz)  Body mass index is 23.48 kg/m².      Intake/Output Summary (Last 24 hours) at 1/8/2022 0637  Last data filed at 1/8/2022 0608  Gross per 24 hour   Intake 476.75 ml   Output 814 ml   Net -337.25 ml       Physical Exam    Vents:  Oxygen Concentration (%): 28 (01/08/22 0339)    Lines/Drains/Airways     Drain                 Gastrostomy/Enterostomy 12/27/21 1152 Percutaneous endoscopic gastrostomy (PEG) LUQ 11 days         Closed/Suction Drain 01/06/22 Right Thigh Bulb 15 Fr. 2 days         Closed/Suction Drain 01/06/22 Right;Anterior Thigh Bulb 15 Fr. 2 days         Closed/Suction Drain 01/06/22 1521 Right Neck Bulb 15 Fr. 1 day         Closed/Suction Drain 01/06/22 1524 Left Neck Bulb 15 Fr. 1 day          Airway                 Laryngectomy (Do Not Remove) 01/06/22 1655 Laryngectomy tube 1 day          Peripheral Intravenous Line                 Midline Catheter Insertion/Assessment  - Single Lumen 01/02/22 1410 Right basilic vein (medial side of arm) other (see comments) 5 days         Peripheral IV - Single Lumen 01/06/22 0853 18 G Right  Hand 1 day                Significant Labs:    CBC/Anemia Profile:  Recent Labs   Lab 01/06/22  1933 01/07/22  0332 01/08/22  0218   WBC 20.60* 22.67* 13.85*   HGB 9.4* 9.3* 9.7*   HCT 29.1* 29.2* 29.7*    719* 441   MCV 87 88 87   RDW 13.1 13.0 13.4        Chemistries:  Recent Labs   Lab 01/06/22  1700 01/07/22  0332 01/08/22 0218   * 135* 135*   K 4.7 5.0 4.1    109 103   CO2 22* 20* 24   BUN 37* 33* 24*   CREATININE 1.0 0.8 0.7   CALCIUM 7.7* 6.8* 7.5*   ALBUMIN  --  1.9*  --    PROT  --  4.3*  --    BILITOT  --  0.6  --    ALKPHOS  --  126  --    ALT  --  19  --    AST  --  36  --    MG 2.0 1.7 1.8   PHOS 4.4 3.8 3.3       All pertinent labs within the past 24 hours have been reviewed.    Significant Imaging:  I have reviewed all pertinent imaging results/findings within the past 24 hours.

## 2022-01-08 NOTE — ASSESSMENT & PLAN NOTE
"- Continue ICU care   Continue q1h flap checks: Record Doppler Pulses (artery and vein) ,Capillary Refill , Color, Turgor, temperature.   - Neurovascular Checks q1h of bilateral upper and lower extremities   - Keep head elevated and in neutral position  - Sign above bed that reads "No circumferential Neck Ties"   - FOR ANY FLAP ISSUES, PLEASE PHONE ENT RESIDENT ON CALL.  - Please label drains carefully and document accordingly  - Maintain xeroform in left ear canal  - Bacitracin twice daily to incision line     Neuro:  - Alert, oriented. Pain medication PRN.      CV:  - HDS. SBP > 90. A-line can be removed  - continue with q1h flap checks     Pulm:  - stable     GI/FEN/Lytes:  - Strict NPO  - monitor calcium closely and replete prn  - start trickle TFs today  - replace lytes prn     Renal:  - UOP adequate. Juarez out.      Heme/ID:  - cont to trend HH  - cont Unasyn     Endo:  - Q6 acuchecks, SSI.      PPX:  PT/OT  L40  PPI     Dispo:   Continue q1h flap checks until 48h postop  Can likely step down to PCU tomorrow morning                  "

## 2022-01-08 NOTE — PROGRESS NOTES
[facility]  Endocrinology  Progress Note    Admit Date: 12/25/2021     Reason for Consult: Management of T2DM, Hyperglycemia     Surgical Procedure and Date:   12/27/21  CREATION, TRACHEOSTOMY (N/A)  LARYNGOSCOPY, DIRECT, WITH BRONCHOSCOPY (N/A)  INSERTION, PEG TUBE (N/A)     Diabetes diagnosis year: 2010    Home Diabetes Medications:  Ozempic stopped on 10/18/21 by Dena Silveira as a1c below goal     How often checking glucose at home?  Once daily    BG readings on regimen: 150s  Hypoglycemia on the regimen?  No  Missed doses on regimen?  n/a    Diabetes Complications include:     None     Complicating diabetes co morbidities:   pAfib, HTN, active cancer       HPI:   Patient is a 71 y.o. 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 He presented as a transfer from [place] for ENT evaluation. He initially presented to the ER for worsening hemoptysis. Laryngeal videostroboscopy 11/8/21 notable for webbing across membranous vocal folds, granular changes infraglottically, firbinous debris across anterior commissure, post surgical stiffness of bilateral true vocal folds, phonatory supraglottic hyperfunction, occasional plica ventricularis, thick salivary secretions, pooled in pyriforms, diffuse mild laryngopharyngeal edema.He is now s/p the above procedure. He was last seen by MISSY Gutierrez on 10/18/21 for DM management. Endocrinology consulted for management of T2DM.            Lab Results   Component Value Date    HGBA1C 5.5 10/05/2021           Interval HPI:   Overnight events:  BG stable and within goal while on current IV/SQ insulin regimen. Patient now has orders for enteral nutrition orders. Expect significant increase in insulin requirements as TF rates are increased.   No diet orders on file  2 Days Post-Op    Eating:   NPO  Nausea: No  Hypoglycemia and intervention: No  Fever: No  TPN and/or TF:  "Yes  If yes, type of TF/TPN and rate: 10 cc/hr.     /67   Pulse 74   Temp 98.8 °F (37.1 °C) (Oral)   Resp 18   Ht 5' 6" (1.676 m)   Wt 66 kg (145 lb 8.1 oz)   SpO2 97%   BMI 23.48 kg/m²     Labs Reviewed and Include    Recent Labs   Lab 01/08/22  0218   *   CALCIUM 7.5*   *   K 4.1   CO2 24      BUN 24*   CREATININE 0.7     Lab Results   Component Value Date    WBC 13.85 (H) 01/08/2022    HGB 9.7 (L) 01/08/2022    HCT 29.7 (L) 01/08/2022    MCV 87 01/08/2022     01/08/2022     No results for input(s): TSH, FREET4 in the last 168 hours.  Lab Results   Component Value Date    HGBA1C 5.9 (H) 01/05/2022       Nutritional status:   Body mass index is 23.48 kg/m².  Lab Results   Component Value Date    ALBUMIN 1.9 (L) 01/07/2022    ALBUMIN 2.6 (L) 01/06/2022    ALBUMIN 2.8 (L) 01/05/2022     No results found for: PREALBUMIN    Estimated Creatinine Clearance: 88.6 mL/min (based on SCr of 0.7 mg/dL).    Accu-Checks  Recent Labs     01/06/22  1649 01/06/22  2224 01/07/22  0331 01/07/22  1146 01/07/22  1633 01/07/22  2034 01/08/22  0158 01/08/22  0533 01/08/22  0812 01/08/22  1217   POCTGLUCOSE 254* 202* 261* 214* 183* 154* 146* 133* 137* 139*       Current Medications and/or Treatments Impacting Glycemic Control  Immunotherapy:    Immunosuppressants     None        Steroids:   Hormones (From admission, onward)            None        Pressors:    Autonomic Drugs (From admission, onward)            None        Hyperglycemia/Diabetes Medications:   Antihyperglycemics (From admission, onward)            Start     Stop Route Frequency Ordered    01/07/22 1200  insulin regular in 0.9 % NaCl 100 unit/100 mL (1 unit/mL) infusion        Question:  Enter initial dose (Units/hr):  Answer:  0.4    -- IV Continuous 01/07/22 1149    01/07/22 1149  insulin aspart U-100 pen 0-5 Units         -- SubQ As needed (PRN) 01/07/22 1049          ASSESSMENT and PLAN    * Larynx neoplasm malignant  Managed per " primary team  Optimize BG control        Type 2 diabetes mellitus with hyperglycemia, without long-term current use of insulin  BG goal 140-180    - Rewrite transition IV insulin infusion with stepdown parameters. Initial rate starting at 0.2 units/hr.   - Low Dose SQ Insulin Correction Scale.  - BG monitoring every 4 hours while NPO.     ** Please call Endocrine for any BG related issues **  ** Please notify Endocrine for any change and/or advance in diet**    Lab Results   Component Value Date    HGBA1C 5.9 (H) 01/05/2022     Discharge Planning:   TBD. Please notify endocrinology prior to discharge.         Hyperlipidemia  Managed per primary team  Condition may cause insulin resistance         Paroxysmal atrial fibrillation  Managed per primary team  Condition may cause insulin resistance         Adverse effect of adrenal cortical steroids, sequela  On steroid therapy per transplant team; may elevate BG readings        On enteral nutrition  Enteral nutrition may increase blood glucose.  Optimize BG control            Vargas Cortez NP  Endocrinology  [facility]

## 2022-01-08 NOTE — PROGRESS NOTES
[facility]  Critical Care - Surgery  Progress Note    Patient Name: Cyrus Batres Jr.  MRN: [id number]  Admission Date: 12/25/2021  Hospital Length of Stay: 14 days  Code Status: Prior  Attending Provider: Jesse James MD  Primary Care Provider: Diana Calhoun MD   Principal Problem: Larynx neoplasm malignant    Subjective:     Hospital/ICU Course:  Admitted to the SICU following TND, total thyroidectomy, laryngectomy and free flap for SCC of glottis/ subglottis on 1/6/22.       Interval History/Significant Events:  - NAEO, flap checks stable  - pain control with PO oxycodone and a dose of breakthrough IV dilaudid  - Hypocalcemia improved from yesterday. Continue current regimen.  - No bowel movements since 12/24/21    Follow-up For: Procedure(s) (LRB):  LARYNGECTOMY (N/A)  DISSECTION, NECK (Bilateral)  CREATION, FREE FLAP (Right)  THYROIDECTOMY  LARYNGOSCOPY (N/A)  REIMPLANTATION, PARATHYROID TISSUE (N/A)    Post-Operative Day: 2 Days Post-Op    Objective:     Vital Signs (Most Recent):  Temp: 99.2 °F (37.3 °C) (01/08/22 0300)  Pulse: 73 (01/08/22 0500)  Resp: 20 (01/08/22 0339)  BP: 117/68 (01/08/22 0500)  SpO2: 99 % (01/08/22 0500) Vital Signs (24h Range):  Temp:  [97.88 °F (36.6 °C)-99.3 °F (37.4 °C)] 99.2 °F (37.3 °C)  Pulse:  [64-88] 73  Resp:  [12-22] 20  SpO2:  [98 %-100 %] 99 %  BP: (109-141)/(56-75) 117/68  Arterial Line BP: ()/(54-83) 93/83     Weight: 66 kg (145 lb 8.1 oz)  Body mass index is 23.48 kg/m².      Intake/Output Summary (Last 24 hours) at 1/8/2022 0637  Last data filed at 1/8/2022 0608  Gross per 24 hour   Intake 476.75 ml   Output 814 ml   Net -337.25 ml       Physical Exam    Vents:  Oxygen Concentration (%): 28 (01/08/22 7569)    Lines/Drains/Airways     Drain                 Gastrostomy/Enterostomy 12/27/21 1152 Percutaneous endoscopic gastrostomy (PEG) LUQ 11 days         Closed/Suction Drain 01/06/22 Right Thigh Bulb 15 Fr. 2 days          Closed/Suction Drain 01/06/22 Right;Anterior Thigh Bulb 15 Fr. 2 days         Closed/Suction Drain 01/06/22 1521 Right Neck Bulb 15 Fr. 1 day         Closed/Suction Drain 01/06/22 1524 Left Neck Bulb 15 Fr. 1 day          Airway                 Laryngectomy (Do Not Remove) 01/06/22 1655 Laryngectomy tube 1 day          Peripheral Intravenous Line                 Midline Catheter Insertion/Assessment  - Single Lumen 01/02/22 1410 Right basilic vein (medial side of arm) other (see comments) 5 days         Peripheral IV - Single Lumen 01/06/22 0853 18 G Right Hand 1 day                Significant Labs:    CBC/Anemia Profile:  Recent Labs   Lab 01/06/22  1933 01/07/22  0332 01/08/22  0218   WBC 20.60* 22.67* 13.85*   HGB 9.4* 9.3* 9.7*   HCT 29.1* 29.2* 29.7*    719* 441   MCV 87 88 87   RDW 13.1 13.0 13.4        Chemistries:  Recent Labs   Lab 01/06/22  1700 01/07/22  0332 01/08/22  0218   * 135* 135*   K 4.7 5.0 4.1    109 103   CO2 22* 20* 24   BUN 37* 33* 24*   CREATININE 1.0 0.8 0.7   CALCIUM 7.7* 6.8* 7.5*   ALBUMIN  --  1.9*  --    PROT  --  4.3*  --    BILITOT  --  0.6  --    ALKPHOS  --  126  --    ALT  --  19  --    AST  --  36  --    MG 2.0 1.7 1.8   PHOS 4.4 3.8 3.3       All pertinent labs within the past 24 hours have been reviewed.    Significant Imaging:  I have reviewed all pertinent imaging results/findings within the past 24 hours.    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  Patient is admitted HDS and on trach collar.       Neuro/Psych:   -- RASS , CAM-ICU  -- PRN tylenol, oxycodone 5mg solution, and breakthrough IV dilaudid      Cardiovascular:   #Atrial fibrillation   -- Continue home Diltiazem 30mg q6h   -- HOLD home eliquis for at least 2 days per ENT  -- Will discuss  with ENT when it is ok to restart ASA    -- Hemodynamics:  -- MAP goal >65  -- Currently HDS on no pressor support     Pulmonary:   #S/p Laryngectomy and total neck dissection on 1/6/22  -- S/p trach on 12/27/21  -- wean trach collar as tolerated     Renal   -- Cr (Baseline): 0.7  -- BUN/Cr 37  BUN   Date Value Ref Range Status   01/08/2022 24 (H) 8 - 23 mg/dL Final   01/07/2022 33 (H) 8 - 23 mg/dL Final     Creatinine   Date Value Ref Range Status   01/08/2022 0.7 0.5 - 1.4 mg/dL Final   01/07/2022 0.8 0.5 - 1.4 mg/dL Final     -- Adequate urine output     FEN / GI:   -- Will begin 10 cc TFs today  -- Ok to use G tube for medication use  -- No BM since [place] per the patient.  Enema scheduled.      -- Electrolytes:   Recent Labs   Lab 01/08/22  0218   *   K 4.1      CO2 24   BUN 24*   CREATININE 0.7   MG 1.8      -- Nutrition:   -- replace electrolytes as needed to keep K>4, Mg>2  -- bowel reg - daily miralax, docusate  -- famotidine for GI prophylaxis     ID:   -- Tm: afebrile  -- WBC , trend daily     Heme/Onc:   -- H/H stable  -- past products received: none  Lab Results   Component Value Date    WBC 13.85 (H) 01/08/2022    HGB 9.7 (L) 01/08/2022    HCT 29.7 (L) 01/08/2022    MCV 87 01/08/2022     01/08/2022        Endo:   #S/p thyroidectomy   -- Trend ionized Ca2+ q8h  -- continue 1500mg of Ca2+ q8h  -- continue calcitrol   -- continue levothyroxine    #DM2  -- endocrine following, appreciate recommendations  -- hold 10u determire qHs  -- continue MDSSI      PPx:   Feeding: Regular diet / NPO for now. Hold TFs per ENT  Analgesia/Sedation: none  Thromboembolic prevention:LVX / SCD  HOB 30  Stress Ulcer ppx: famotidine  Glucose control: Critical care goal 140-180 g/dl  Turn q2 hrs     Dispo/Code Status/Palliative:   -- continue care in the SICU          Critical secondary to Patient has a condition that poses threat to life and bodily function: Flap checks.     Critical care was time  spent personally by me on the following activities: development of treatment plan with patient or surrogate and bedside caregivers, discussions with consultants, evaluation of patient's response to treatment, examination of patient, ordering and performing treatments and interventions, ordering and review of laboratory studies, ordering and review of radiographic studies, pulse oximetry, re-evaluation of patient's condition.  This critical care time did not overlap with that of any other provider or involve time for any procedures.     Tashi Kothari MD  Critical Care - Surgery  [facility]

## 2022-01-08 NOTE — PROGRESS NOTES
[facility] - Surgical Intensive Care  Otorhinolaryngology-Head & Neck Surgery  Progress Note    Subjective:     Post-Op Info:  Procedure(s) (LRB):  LARYNGECTOMY (N/A)  DISSECTION, NECK (Bilateral)  CREATION, FREE FLAP (Right)  THYROIDECTOMY  LARYNGOSCOPY (N/A)  REIMPLANTATION, PARATHYROID TISSUE (N/A)   2 Days Post-Op  Hospital Day: 15     Interval History: No issues overnight. Pain controlled. OOB. Flap checks appropriate. Minimal stomal crusting.     Medications:  Continuous Infusions:   insulin regular 1 units/mL infusion orderable (TRANSFER) 0.2 Units/hr (01/08/22 0900)     Scheduled Meds:   ampicillin-sulbactim (UNASYN) IVPB  3 g Intravenous Q8H    calcitrioL  0.5 mcg Per G Tube BID    calcium carbonate  1,500 mg Per G Tube TID    enoxaparin  40 mg Subcutaneous Daily    famotidine (PF)  20 mg Intravenous BID    levothyroxine  112 mcg Per G Tube Before breakfast    sodium phosphates  1 enema Rectal Once     PRN Meds:dextrose 50%, dextrose 50%, glucagon (human recombinant), glucose, glucose, HYDROmorphone, insulin aspart U-100, magnesium oxide, magnesium oxide, oxyCODONE, oxyCODONE, potassium bicarbonate, potassium bicarbonate, potassium bicarbonate, potassium, sodium phosphates, potassium, sodium phosphates, potassium, sodium phosphates     Review of patient's allergies indicates:   Allergen Reactions    Pollen extracts     Lovastatin Rash     Not confirmed but pt skeptical     Objective:     Vital Signs (24h Range):  Temp:  [97.88 °F (36.6 °C)-99.3 °F (37.4 °C)] 98.8 °F (37.1 °C)  Pulse:  [65-88] 78  Resp:  [12-22] 18  SpO2:  [97 %-100 %] 97 %  BP: (113-141)/(58-75) 135/68     Date 01/08/22 0700 - 01/09/22 0659   Shift 1500-3584 8175-9743 8612-9333 24 Hour Total   INTAKE   I.V.(mL/kg) 0.4(0)   0.4(0)   Shift Total(mL/kg) 0.4(0)   0.4(0)   OUTPUT   Urine(mL/kg/hr) 230   230   Shift Total(mL/kg) 230(3.5)   230(3.5)   Weight (kg) 66 66 66 66     Lines/Drains/Airways     Drain                  Gastrostomy/Enterostomy 12/27/21 1152 Percutaneous endoscopic gastrostomy (PEG) LUQ 11 days         Closed/Suction Drain 01/06/22 Right Thigh Bulb 15 Fr. 2 days         Closed/Suction Drain 01/06/22 Right;Anterior Thigh Bulb 15 Fr. 2 days         Closed/Suction Drain 01/06/22 1521 Right Neck Bulb 15 Fr. 1 day         Closed/Suction Drain 01/06/22 1524 Left Neck Bulb 15 Fr. 1 day          Airway                 Laryngectomy (Do Not Remove) 01/06/22 1655 Laryngectomy tube 1 day          Peripheral Intravenous Line                 Midline Catheter Insertion/Assessment  - Single Lumen 01/02/22 1410 Right basilic vein (medial side of arm) other (see comments) 5 days         Peripheral IV - Single Lumen 01/06/22 0853 18 G Right Hand 2 days                Physical Exam  Awake, alert, nad  EOMI  Neck - soft, incision c/d/i bilaterally  Flap - good color/turgor, strong triphasic doppler, cap refill appropriate  GLENN neck and leg with ss output  AAOx3, communicates with gestures    Significant Labs:  CBC:   Recent Labs   Lab 01/08/22  0218   WBC 13.85*   RBC 3.43*   HGB 9.7*   HCT 29.7*      MCV 87   MCH 28.3   MCHC 32.7     CMP:   Recent Labs   Lab 01/07/22  0332 01/07/22  0332 01/08/22  0218   *   < > 140*   CALCIUM 6.8*   < > 7.5*   ALBUMIN 1.9*  --   --    PROT 4.3*  --   --    *   < > 135*   K 5.0   < > 4.1   CO2 20*   < > 24      < > 103   BUN 33*   < > 24*   CREATININE 0.8   < > 0.7   ALKPHOS 126  --   --    ALT 19  --   --    AST 36  --   --    BILITOT 0.6  --   --     < > = values in this interval not displayed.       Significant Diagnostics:  I have reviewed and interpreted all pertinent imaging results/findings within the past 24 hours.    Assessment/Plan:     * Larynx neoplasm malignant  - Continue ICU care   Continue q1h flap checks: Record Doppler Pulses (artery and vein) ,Capillary Refill , Color, Turgor, temperature.   - Neurovascular Checks q1h of bilateral upper and lower extremities  "  - Keep head elevated and in neutral position  - Sign above bed that reads "No circumferential Neck Ties"   - FOR ANY FLAP ISSUES, PLEASE PHONE ENT RESIDENT ON CALL.  - Please label drains carefully and document accordingly  - Maintain xeroform in left ear canal  - Bacitracin twice daily to incision line     Neuro:  - Alert, oriented. Pain medication PRN.      CV:  - HDS. SBP > 90. A-line can be removed  - continue with q1h flap checks     Pulm:  - stable     GI/FEN/Lytes:  - Strict NPO  - monitor calcium closely and replete prn  - start trickle TFs today  - replace lytes prn     Renal:  - UOP adequate. Juarez out.      Heme/ID:  - cont to trend HH  - cont Unasyn     Endo:  - Q6 acuchecks, SSI.      PPX:  PT/OT  L40  PPI     Dispo:   Continue q1h flap checks until 48h postop  Can likely step down to PCU tomorrow morning                        Mitesh Tang MD  Otorhinolaryngology-Head & Neck Surgery  [facility] - [facility]  "

## 2022-01-08 NOTE — ASSESSMENT & PLAN NOTE
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  Patient is admitted HDS and on trach collar.       Neuro/Psych:   -- RASS , CAM-ICU  -- PRN tylenol, oxycodone 5mg solution, and breakthrough IV dilaudid      Cardiovascular:   #Atrial fibrillation   -- Continue home Diltiazem 30mg q6h   -- HOLD home eliquis for at least 2 days per ENT  -- Will discuss with ENT when it is ok to restart ASA    -- Hemodynamics:  -- MAP goal >65  -- Currently HDS on no pressor support     Pulmonary:   #S/p Laryngectomy and total neck dissection on 1/6/22  -- S/p trach on 12/27/21  -- wean trach collar as tolerated     Renal   -- Cr (Baseline): 0.7  -- BUN/Cr 37  BUN   Date Value Ref Range Status   01/08/2022 24 (H) 8 - 23 mg/dL Final   01/07/2022 33 (H) 8 - 23 mg/dL Final     Creatinine   Date Value Ref Range Status   01/08/2022 0.7 0.5 - 1.4 mg/dL Final   01/07/2022 0.8 0.5 - 1.4 mg/dL Final     -- Adequate urine output     FEN / GI:   -- Will begin 10 cc TFs today  -- Ok to use G tube for medication use  -- No BM since [place] per the patient.  Enema scheduled.      -- Electrolytes:   Recent Labs   Lab 01/08/22  0218   *   K 4.1      CO2 24   BUN 24*   CREATININE 0.7   MG 1.8      -- Nutrition:   -- replace electrolytes as needed to keep K>4, Mg>2  -- bowel reg - daily miralax, docusate  -- famotidine for GI prophylaxis     ID:   -- Tm: afebrile  -- WBC , trend daily     Heme/Onc:   -- H/H stable  -- past products received: none  Lab Results   Component Value Date    WBC 13.85 (H) 01/08/2022    HGB 9.7 (L) 01/08/2022    HCT 29.7 (L) 01/08/2022    MCV 87 01/08/2022     01/08/2022        Endo:   #S/p thyroidectomy   -- Trend ionized Ca2+ q8h  -- continue 1500mg of Ca2+ q8h  -- continue calcitrol   -- continue levothyroxine    #DM2  -- endocrine following,  appreciate recommendations  -- hold 10u determire qHs  -- continue MDSSI      PPx:   Feeding: Regular diet / NPO for now. Hold TFs per ENT  Analgesia/Sedation: none  Thromboembolic prevention:LVX / SCD  HOB 30  Stress Ulcer ppx: famotidine  Glucose control: Critical care goal 140-180 g/dl  Turn q2 hrs     Dispo/Code Status/Palliative:   -- continue care in the SICU

## 2022-01-08 NOTE — SUBJECTIVE & OBJECTIVE
"Interval HPI:   Overnight events:  BG stable and within goal while on current IV/SQ insulin regimen. Patient now has orders for enteral nutrition orders. Expect significant increase in insulin requirements as TF rates are increased.   No diet orders on file  2 Days Post-Op    Eating:   NPO  Nausea: No  Hypoglycemia and intervention: No  Fever: No  TPN and/or TF: Yes  If yes, type of TF/TPN and rate: 10 cc/hr.     /67   Pulse 74   Temp 98.8 °F (37.1 °C) (Oral)   Resp 18   Ht 5' 6" (1.676 m)   Wt 66 kg (145 lb 8.1 oz)   SpO2 97%   BMI 23.48 kg/m²     Labs Reviewed and Include    Recent Labs   Lab 01/08/22  0218   *   CALCIUM 7.5*   *   K 4.1   CO2 24      BUN 24*   CREATININE 0.7     Lab Results   Component Value Date    WBC 13.85 (H) 01/08/2022    HGB 9.7 (L) 01/08/2022    HCT 29.7 (L) 01/08/2022    MCV 87 01/08/2022     01/08/2022     No results for input(s): TSH, FREET4 in the last 168 hours.  Lab Results   Component Value Date    HGBA1C 5.9 (H) 01/05/2022       Nutritional status:   Body mass index is 23.48 kg/m².  Lab Results   Component Value Date    ALBUMIN 1.9 (L) 01/07/2022    ALBUMIN 2.6 (L) 01/06/2022    ALBUMIN 2.8 (L) 01/05/2022     No results found for: PREALBUMIN    Estimated Creatinine Clearance: 88.6 mL/min (based on SCr of 0.7 mg/dL).    Accu-Checks  Recent Labs     01/06/22  1649 01/06/22  2224 01/07/22  0331 01/07/22  1146 01/07/22  1633 01/07/22  2034 01/08/22  0158 01/08/22  0533 01/08/22  0812 01/08/22  1217   POCTGLUCOSE 254* 202* 261* 214* 183* 154* 146* 133* 137* 139*       Current Medications and/or Treatments Impacting Glycemic Control  Immunotherapy:    Immunosuppressants     None        Steroids:   Hormones (From admission, onward)            None        Pressors:    Autonomic Drugs (From admission, onward)            None        Hyperglycemia/Diabetes Medications:   Antihyperglycemics (From admission, onward)            Start     Stop Route " Frequency Ordered    01/07/22 1200  insulin regular in 0.9 % NaCl 100 unit/100 mL (1 unit/mL) infusion        Question:  Enter initial dose (Units/hr):  Answer:  0.4    -- IV Continuous 01/07/22 1149    01/07/22 1149  insulin aspart U-100 pen 0-5 Units         -- SubQ As needed (PRN) 01/07/22 1042

## 2022-01-09 LAB
ANION GAP SERPL CALC-SCNC: 9 MMOL/L (ref 8–16)
BASOPHILS # BLD AUTO: 0.04 K/UL (ref 0–0.2)
BASOPHILS NFR BLD: 0.4 % (ref 0–1.9)
BUN SERPL-MCNC: 24 MG/DL (ref 8–23)
CA-I BLDV-SCNC: 1.16 MMOL/L (ref 1.06–1.42)
CA-I BLDV-SCNC: 1.19 MMOL/L (ref 1.06–1.42)
CALCIUM SERPL-MCNC: 7.7 MG/DL (ref 8.7–10.5)
CHLORIDE SERPL-SCNC: 101 MMOL/L (ref 95–110)
CO2 SERPL-SCNC: 24 MMOL/L (ref 23–29)
CREAT SERPL-MCNC: 0.7 MG/DL (ref 0.5–1.4)
DIFFERENTIAL METHOD: ABNORMAL
EOSINOPHIL # BLD AUTO: 0.1 K/UL (ref 0–0.5)
EOSINOPHIL NFR BLD: 0.7 % (ref 0–8)
ERYTHROCYTE [DISTWIDTH] IN BLOOD BY AUTOMATED COUNT: 13.1 % (ref 11.5–14.5)
EST. GFR  (AFRICAN AMERICAN): >60 ML/MIN/1.73 M^2
EST. GFR  (NON AFRICAN AMERICAN): >60 ML/MIN/1.73 M^2
GLUCOSE SERPL-MCNC: 123 MG/DL (ref 70–110)
HCT VFR BLD AUTO: 28.8 % (ref 40–54)
HGB BLD-MCNC: 9.3 G/DL (ref 14–18)
IMM GRANULOCYTES # BLD AUTO: 0.07 K/UL (ref 0–0.04)
IMM GRANULOCYTES NFR BLD AUTO: 0.6 % (ref 0–0.5)
LYMPHOCYTES # BLD AUTO: 0.6 K/UL (ref 1–4.8)
LYMPHOCYTES NFR BLD: 5.6 % (ref 18–48)
MAGNESIUM SERPL-MCNC: 1.8 MG/DL (ref 1.6–2.6)
MCH RBC QN AUTO: 28.1 PG (ref 27–31)
MCHC RBC AUTO-ENTMCNC: 32.3 G/DL (ref 32–36)
MCV RBC AUTO: 87 FL (ref 82–98)
MONOCYTES # BLD AUTO: 0.6 K/UL (ref 0.3–1)
MONOCYTES NFR BLD: 5.2 % (ref 4–15)
NEUTROPHILS # BLD AUTO: 9.7 K/UL (ref 1.8–7.7)
NEUTROPHILS NFR BLD: 87.5 % (ref 38–73)
NRBC BLD-RTO: 0 /100 WBC
PHOSPHATE SERPL-MCNC: 3.2 MG/DL (ref 2.7–4.5)
PLATELET # BLD AUTO: 431 K/UL (ref 150–450)
PMV BLD AUTO: 9.6 FL (ref 9.2–12.9)
POCT GLUCOSE: 142 MG/DL (ref 70–110)
POCT GLUCOSE: 150 MG/DL (ref 70–110)
POCT GLUCOSE: 163 MG/DL (ref 70–110)
POCT GLUCOSE: 164 MG/DL (ref 70–110)
POCT GLUCOSE: 166 MG/DL (ref 70–110)
POCT GLUCOSE: 178 MG/DL (ref 70–110)
POTASSIUM SERPL-SCNC: 3.8 MMOL/L (ref 3.5–5.1)
RBC # BLD AUTO: 3.31 M/UL (ref 4.6–6.2)
SODIUM SERPL-SCNC: 134 MMOL/L (ref 136–145)
WBC # BLD AUTO: 11.09 K/UL (ref 3.9–12.7)

## 2022-01-09 PROCEDURE — 99291 PR CRITICAL CARE, E/M 30-74 MINUTES: ICD-10-PCS | Mod: HCNC,,, | Performed by: STUDENT IN AN ORGANIZED HEALTH CARE EDUCATION/TRAINING PROGRAM

## 2022-01-09 PROCEDURE — 25000003 PHARM REV CODE 250: Mod: HCNC | Performed by: STUDENT IN AN ORGANIZED HEALTH CARE EDUCATION/TRAINING PROGRAM

## 2022-01-09 PROCEDURE — 99900026 HC AIRWAY MAINTENANCE (STAT): Mod: HCNC

## 2022-01-09 PROCEDURE — 99232 SBSQ HOSP IP/OBS MODERATE 35: CPT | Mod: HCNC,,, | Performed by: NURSE PRACTITIONER

## 2022-01-09 PROCEDURE — 99232 PR SUBSEQUENT HOSPITAL CARE,LEVL II: ICD-10-PCS | Mod: HCNC,,, | Performed by: NURSE PRACTITIONER

## 2022-01-09 PROCEDURE — 82330 ASSAY OF CALCIUM: CPT | Mod: 91,HCNC | Performed by: STUDENT IN AN ORGANIZED HEALTH CARE EDUCATION/TRAINING PROGRAM

## 2022-01-09 PROCEDURE — 63700000 PHARM REV CODE 250 ALT 637 W/O HCPCS: Mod: HCNC | Performed by: STUDENT IN AN ORGANIZED HEALTH CARE EDUCATION/TRAINING PROGRAM

## 2022-01-09 PROCEDURE — 20000000 HC ICU ROOM: Mod: HCNC

## 2022-01-09 PROCEDURE — 80048 BASIC METABOLIC PNL TOTAL CA: CPT | Mod: HCNC | Performed by: STUDENT IN AN ORGANIZED HEALTH CARE EDUCATION/TRAINING PROGRAM

## 2022-01-09 PROCEDURE — 84100 ASSAY OF PHOSPHORUS: CPT | Mod: HCNC | Performed by: STUDENT IN AN ORGANIZED HEALTH CARE EDUCATION/TRAINING PROGRAM

## 2022-01-09 PROCEDURE — 99900035 HC TECH TIME PER 15 MIN (STAT): Mod: HCNC

## 2022-01-09 PROCEDURE — 83735 ASSAY OF MAGNESIUM: CPT | Mod: HCNC | Performed by: STUDENT IN AN ORGANIZED HEALTH CARE EDUCATION/TRAINING PROGRAM

## 2022-01-09 PROCEDURE — 27000221 HC OXYGEN, UP TO 24 HOURS: Mod: HCNC

## 2022-01-09 PROCEDURE — 97530 THERAPEUTIC ACTIVITIES: CPT | Mod: HCNC

## 2022-01-09 PROCEDURE — 97164 PT RE-EVAL EST PLAN CARE: CPT | Mod: HCNC

## 2022-01-09 PROCEDURE — 97168 OT RE-EVAL EST PLAN CARE: CPT | Mod: HCNC

## 2022-01-09 PROCEDURE — 94761 N-INVAS EAR/PLS OXIMETRY MLT: CPT | Mod: HCNC

## 2022-01-09 PROCEDURE — 63600175 PHARM REV CODE 636 W HCPCS: Mod: HCNC | Performed by: STUDENT IN AN ORGANIZED HEALTH CARE EDUCATION/TRAINING PROGRAM

## 2022-01-09 PROCEDURE — 99291 CRITICAL CARE FIRST HOUR: CPT | Mod: HCNC,,, | Performed by: STUDENT IN AN ORGANIZED HEALTH CARE EDUCATION/TRAINING PROGRAM

## 2022-01-09 PROCEDURE — 85025 COMPLETE CBC W/AUTO DIFF WBC: CPT | Mod: HCNC | Performed by: STUDENT IN AN ORGANIZED HEALTH CARE EDUCATION/TRAINING PROGRAM

## 2022-01-09 PROCEDURE — 82330 ASSAY OF CALCIUM: CPT | Mod: HCNC | Performed by: STUDENT IN AN ORGANIZED HEALTH CARE EDUCATION/TRAINING PROGRAM

## 2022-01-09 RX ORDER — DILTIAZEM HYDROCHLORIDE 30 MG/1
30 TABLET, FILM COATED ORAL EVERY 6 HOURS
Status: DISCONTINUED | OUTPATIENT
Start: 2022-01-09 | End: 2022-01-14 | Stop reason: HOSPADM

## 2022-01-09 RX ORDER — ACETAMINOPHEN 650 MG/20.3ML
650 LIQUID ORAL EVERY 6 HOURS PRN
Status: DISCONTINUED | OUTPATIENT
Start: 2022-01-09 | End: 2022-01-10

## 2022-01-09 RX ADMIN — AMPICILLIN SODIUM AND SULBACTAM SODIUM 3 G: 2; 1 INJECTION, POWDER, FOR SOLUTION INTRAMUSCULAR; INTRAVENOUS at 04:01

## 2022-01-09 RX ADMIN — CALCIUM CARBONATE 1500 MG: 1250 SUSPENSION ORAL at 09:01

## 2022-01-09 RX ADMIN — DILTIAZEM HYDROCHLORIDE 30 MG: 30 TABLET, FILM COATED ORAL at 11:01

## 2022-01-09 RX ADMIN — DOCUSATE SODIUM 100 MG: 50 LIQUID ORAL at 08:01

## 2022-01-09 RX ADMIN — AMPICILLIN SODIUM AND SULBACTAM SODIUM 3 G: 2; 1 INJECTION, POWDER, FOR SOLUTION INTRAMUSCULAR; INTRAVENOUS at 06:01

## 2022-01-09 RX ADMIN — LEVOTHYROXINE SODIUM 112 MCG: 112 TABLET ORAL at 06:01

## 2022-01-09 RX ADMIN — ACETAMINOPHEN 650 MG: 160 SOLUTION ORAL at 09:01

## 2022-01-09 RX ADMIN — SENNOSIDES 5 ML: 8.8 SYRUP ORAL at 08:01

## 2022-01-09 RX ADMIN — CALCIUM CARBONATE 1500 MG: 1250 SUSPENSION ORAL at 08:01

## 2022-01-09 RX ADMIN — POTASSIUM BICARBONATE 50 MEQ: 978 TABLET, EFFERVESCENT ORAL at 08:01

## 2022-01-09 RX ADMIN — DILTIAZEM HYDROCHLORIDE 30 MG: 30 TABLET, FILM COATED ORAL at 05:01

## 2022-01-09 RX ADMIN — CALCITRIOL 0.5 MCG: 1 SOLUTION ORAL at 09:01

## 2022-01-09 RX ADMIN — AMPICILLIN SODIUM AND SULBACTAM SODIUM 3 G: 2; 1 INJECTION, POWDER, FOR SOLUTION INTRAMUSCULAR; INTRAVENOUS at 10:01

## 2022-01-09 RX ADMIN — ENOXAPARIN SODIUM 40 MG: 100 INJECTION SUBCUTANEOUS at 04:01

## 2022-01-09 RX ADMIN — INSULIN ASPART 1 UNITS: 100 INJECTION, SOLUTION INTRAVENOUS; SUBCUTANEOUS at 08:01

## 2022-01-09 RX ADMIN — DOCUSATE SODIUM 100 MG: 50 LIQUID ORAL at 09:01

## 2022-01-09 RX ADMIN — FAMOTIDINE 20 MG: 10 INJECTION, SOLUTION INTRAVENOUS at 09:01

## 2022-01-09 RX ADMIN — DILTIAZEM HYDROCHLORIDE 30 MG: 30 TABLET, FILM COATED ORAL at 08:01

## 2022-01-09 RX ADMIN — Medication 800 MG: at 08:01

## 2022-01-09 RX ADMIN — FAMOTIDINE 20 MG: 10 INJECTION, SOLUTION INTRAVENOUS at 08:01

## 2022-01-09 RX ADMIN — INSULIN ASPART 1 UNITS: 100 INJECTION, SOLUTION INTRAVENOUS; SUBCUTANEOUS at 05:01

## 2022-01-09 RX ADMIN — INSULIN ASPART 1 UNITS: 100 INJECTION, SOLUTION INTRAVENOUS; SUBCUTANEOUS at 11:01

## 2022-01-09 RX ADMIN — INSULIN ASPART 1 UNITS: 100 INJECTION, SOLUTION INTRAVENOUS; SUBCUTANEOUS at 09:01

## 2022-01-09 RX ADMIN — Medication 800 MG: at 04:01

## 2022-01-09 RX ADMIN — CALCIUM CARBONATE 1500 MG: 1250 SUSPENSION ORAL at 04:01

## 2022-01-09 NOTE — SUBJECTIVE & OBJECTIVE
"Interval HPI:   Overnight events:   BG stable while on low dose IV insulin/SQ regimen. TF are currently at trickle rate. Expect increase in insulin requirements with increase in enteral nutrition rates. Endo will continue to follow, and manage glycemic control while inpatient.   Diet NPO  3 Days Post-Op    Eating:   NPO  Nausea: No  Hypoglycemia and intervention: No  Fever: No  TPN and/or TF: No  If yes, type of TF/TPN and rate: None    /70 (BP Location: Left arm, Patient Position: Lying)   Pulse 71   Temp 99.2 °F (37.3 °C) (Oral)   Resp 17   Ht 5' 6" (1.676 m)   Wt 66 kg (145 lb 8.1 oz)   SpO2 97%   BMI 23.48 kg/m²     Labs Reviewed and Include    Recent Labs   Lab 01/09/22  0327   *   CALCIUM 7.7*   *   K 3.8   CO2 24      BUN 24*   CREATININE 0.7     Lab Results   Component Value Date    WBC 11.09 01/09/2022    HGB 9.3 (L) 01/09/2022    HCT 28.8 (L) 01/09/2022    MCV 87 01/09/2022     01/09/2022     No results for input(s): TSH, FREET4 in the last 168 hours.  Lab Results   Component Value Date    HGBA1C 5.9 (H) 01/05/2022       Nutritional status:   Body mass index is 23.48 kg/m².  Lab Results   Component Value Date    ALBUMIN 1.9 (L) 01/07/2022    ALBUMIN 2.6 (L) 01/06/2022    ALBUMIN 2.8 (L) 01/05/2022     No results found for: PREALBUMIN    Estimated Creatinine Clearance: 88.6 mL/min (based on SCr of 0.7 mg/dL).    Accu-Checks  Recent Labs     01/07/22  1633 01/07/22  2034 01/08/22  0158 01/08/22  0533 01/08/22  0812 01/08/22  1217 01/08/22  1959 01/08/22  2352 01/09/22  0330 01/09/22  0830   POCTGLUCOSE 183* 154* 146* 133* 137* 139* 142* 161* 142* 150*       Current Medications and/or Treatments Impacting Glycemic Control  Immunotherapy:    Immunosuppressants     None        Steroids:   Hormones (From admission, onward)            None        Pressors:    Autonomic Drugs (From admission, onward)            None        Hyperglycemia/Diabetes Medications: "   Antihyperglycemics (From admission, onward)            Start     Stop Route Frequency Ordered    01/08/22 1315  insulin regular in 0.9 % NaCl 100 unit/100 mL (1 unit/mL) infusion        Question:  Enter initial dose (Units/hr):  Answer:  0.2    -- IV Continuous 01/08/22 1305    01/07/22 1149  insulin aspart U-100 pen 0-5 Units         -- SubQ As needed (PRN) 01/07/22 1047

## 2022-01-09 NOTE — PT/OT/SLP RE-EVAL
Occupational Therapy   Re-evaluation    Name: Cyrus Batres Jr.  MRN: [id number]  Admitting Diagnosis:  Larynx neoplasm malignant  Recent Surgery: Procedure(s) (LRB):  LARYNGECTOMY (N/A)  DISSECTION, NECK (Bilateral)  CREATION, FREE FLAP (Right)  THYROIDECTOMY  LARYNGOSCOPY (N/A)  REIMPLANTATION, PARATHYROID TISSUE (N/A) 3 Days Post-Op    Recommendations:     Discharge Recommendations: home    Assessment:     Cyrus Batres Jr. is a 71 y.o. male with a medical diagnosis of Larynx neoplasm malignant.  .  Performance deficits affecting function are weakness,impaired endurance,impaired self care skills,impaired functional mobilty,gait instability,impaired balance.    Pt tolerated session well with good effort and performance. Anticipate pt will progress well and will be ready for d/c home when medically stable.   Rehab Prognosis:  Good ; patient would benefit from acute skilled OT services to address these deficits and reach maximum level of function.       Plan:     Patient to be seen 3 x/week to address the above listed problems via self-care/home management,therapeutic activities,therapeutic exercises  · Plan of Care Expires: 12/30/21  · Plan of Care Reviewed with: patient    Subjective     Pt agreeable to therapy   Communicated with: ayse  prior to session.  Pain/Comfort:  · Pain Rating 1: 7/10  · Location - Side 1: Right  · Location 1: leg  · Pain Addressed 1: Reposition,Distraction,Nurse notified    Objective:     Communicated with: ayse prior to session.  Patient found supine in bed with GLENN drains, tele, pulse ox, BP cuff, Continuous Doppler   General Precautions: Standard, fall,respiratory *neck breather*    Occupational Performance:    Bed Mobility:    Supine>sit with MIN A and cues for technique.     Functional Mobility/Transfers:  · CGA sit<>stand     Activities of Daily Living:  · Feeding:NPO  · G/H: seated with set-up  · LE dressing: TOTAL A     Cognitive/Visual Perceptual:  Pt awake, alert and following  commands. Pt communicating by mouthing words and using pen/board.     Physical Exam:  Pt demo WFL UE strength and intact .     [facility] 6 Click:  AMPA Total Score: 9    Treatment & Education:  Pt demo Fair to Fair+ sitting balance. Pt able stand and amulate in room with CGA approx 15 feet. Pt able to statically stand and look out of the window with CGA. Pt t/f to chair with CGA. Pt does need cues to increased JANINE with standing and steps as he tends to keep very narrow JANINE increasing his postural sway and decreasing his balance.   Education provided re: OT POC and safety with functional mobility/ADl skills.     Patient left up in chair with all lines intact, call button in reach and nsg present    GOALS:   Multidisciplinary Problems     Occupational Therapy Goals        Problem: Occupational Therapy Goal    Goal Priority Disciplines Outcome Interventions   Occupational Therapy Goal     OT, PT/OT Ongoing, Progressing    Description: Goals to be met by: 7 days 1/16/22     Patient will increase functional independence with ADLs by performing:    Pt to complete UE dressing with set-up  Pt to complete LE dressing with Set-up  Pt to complete standing g/h skills with supervision.  Pt to complete t/f to commode with supervision.  Pt to complete toileting with supervision.              Multidisciplinary Problems (Resolved)        Problem: Occupational Therapy Goal    Goal Priority Disciplines Outcome Interventions   Occupational Therapy Goal   (Resolved)     OT, PT/OT Met    Description: Skilled OT services not necessary at this time secondary to patient performing ADLs at PLOF. Patient in agreement. Please re-consult OT if change in patient's functional status is noted.                    History:     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       Past  Surgical History:   Procedure Laterality Date    DIRECT LARYNGOBRONCHOSCOPY N/A 12/27/2021    Procedure: LARYNGOSCOPY, DIRECT, WITH BRONCHOSCOPY;  Surgeon: Flex Espinosa MD;  [facility];  Service: ENT;  Laterality: N/A;    DISSECTION OF NECK Bilateral 1/6/2022    Procedure: DISSECTION, NECK;  Surgeon: Jesse James MD;  [facility];  Service: ENT;  Laterality: Bilateral;    FLAP PROCEDURE Right 1/6/2022    Procedure: CREATION, FREE FLAP;  Surgeon: Alise Hart MD;  [facility];  Service: ENT;  Laterality: Right;  Ischemic start 1351  Ischemic stop 1502    LARYNGECTOMY N/A 1/6/2022    Procedure: LARYNGECTOMY;  Surgeon: Jesse James MD;  [facility];  Service: ENT;  Laterality: N/A;    LARYNGOSCOPY N/A 8/4/2020    Procedure: Suspension microlaryngoscopy with biopsy, possible KTP laser treatment/excision;  Surgeon: Stew Noel MD;  [facility];  Service: ENT;  Laterality: N/A;  Microscope, telescopes, tower, microinstruments, KTP laser, rep conf# [id number] IC 7/28.    LARYNGOSCOPY N/A 3/16/2021    Procedure: Suspension microlaryngoscopy with excision of lesion, possible CO2 laser;  Surgeon: Stew Noel MD;  [facility];  Service: ENT;  Laterality: N/A;  Microscope, telescopes, tower, microinstruments, CO2 laser, rep conf# [id number] IC 3/4.    LARYNGOSCOPY N/A 4/1/2021    Procedure: Suspension microlaryngoscopy with KTP laser excision of lesion;  Surgeon: Stew Noel MD;  [facility];  Service: ENT;  Laterality: N/A;  Microscope, telescopes, tower, microinstruments, 70 degree scope, vocal fold , KTP laser, rep conf# [id number] BC    LARYNGOSCOPY N/A 12/9/2021    Procedure: Suspension microlaryngoscopy with biopsy;  Surgeon: Stew Noel MD;  Location: [facility] OR 2ND FLR;  Service: ENT;  Laterality: N/A;  Microscope, telescopes, tower, microinstruments     LARYNGOSCOPY N/A 1/6/2022    Procedure: LARYNGOSCOPY;  Surgeon: Jesse James MD;  [facility]: [facility];  Service: ENT;  Laterality: N/A;    MICROLARYNGOSCOPY N/A 3/17/2020    Procedure: MICROLARYNGOSCOPY;  Surgeon: Jung Xiao MD;  [facility];  Service: ENT;  Laterality: N/A;  Laser Microlaryngoscopy  NEED TO SCHEDULE LASER from [facility] [id number]    REIMPLANTATION OF PARATHYROID TISSUE N/A 1/6/2022    Procedure: REIMPLANTATION, PARATHYROID TISSUE;  Surgeon: Jesse James MD;  [facility];  Service: ENT;  Laterality: N/A;    THYROIDECTOMY  1/6/2022    Procedure: THYROIDECTOMY;  Surgeon: Jesse James MD;  [facility];  Service: ENT;;    TRACHEOSTOMY N/A 12/27/2021    Procedure: CREATION, TRACHEOSTOMY;  Surgeon: Flex Espinosa MD;  [facility];  Service: ENT;  Laterality: N/A;       Time Tracking:     OT Date of Treatment: 01/09/22  OT Start Time: 0955  OT Stop Time: 1020  OT Total Time (min): 25 min    Billable Minutes:Re-eval 15  Therapeutic Activity 10    1/9/2022

## 2022-01-09 NOTE — PROGRESS NOTES
[facility]  Critical Care - Surgery  Progress Note    Patient Name: Cyrus Batres Jr.  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       Interval History/Significant Events:   - NAEON.  On RA  - Pain is still well controlled with current regimen.  - ENT to back off flap checks to q2h.      Follow-up For: Procedure(s) (LRB):  LARYNGECTOMY (N/A)  DISSECTION, NECK (Bilateral)  CREATION, FREE FLAP (Right)  THYROIDECTOMY  LARYNGOSCOPY (N/A)  REIMPLANTATION, PARATHYROID TISSUE (N/A)    Post-Operative Day: 3 Days Post-Op    Objective:     Vital Signs (Most Recent):  Temp: 99.2 °F (37.3 °C) (01/08/22 2300)  Pulse: 67 (01/09/22 0800)  Resp: 17 (01/09/22 0800)  BP: 134/69 (01/09/22 0800)  SpO2: 98 % (01/09/22 0800) Vital Signs (24h Range):  Temp:  [99.2 °F (37.3 °C)] 99.2 °F (37.3 °C)  Pulse:  [65-83] 67  Resp:  [14-25] 17  SpO2:  [96 %-99 %] 98 %  BP: (117-144)/(65-77) 134/69     Weight: 66 kg (145 lb 8.1 oz)  Body mass index is 23.48 kg/m².      Intake/Output Summary (Last 24 hours) at 1/9/2022 0857  Last data filed at 1/9/2022 0700  Gross per 24 hour   Intake 408.62 ml   Output 764 ml   Net -355.38 ml       Physical Exam  Constitutional:       General: He is not in acute distress.  HENT:      Head: Normocephalic.      Nose: Nose normal.      Mouth/Throat:      Mouth: Mucous membranes are dry.   Eyes:      Extraocular Movements: Extraocular movements intact.      Pupils: Pupils are equal, round, and reactive to light.   Neck:      Comments: Neck drains with light bloody output  Free flap with good pulsation on doppler.   Cardiovascular:      Rate  and Rhythm: Normal rate and regular rhythm.      Pulses: Normal pulses.      Heart sounds: Normal heart sounds.   Pulmonary:      Effort: Pulmonary effort is normal. No respiratory distress.      Breath sounds: Normal breath sounds.      Comments: Paul tube with trach collar   Abdominal:      General: Abdomen is flat. Bowel sounds are normal. There is no distension.      Palpations: Abdomen is soft.   Musculoskeletal:      Comments: RLE excision for flap with 1x drain in place   Skin:     General: Skin is warm and dry.   Neurological:      General: No focal deficit present.      Mental Status: He is alert.   Psychiatric:         Mood and Affect: Mood normal.         Vents:  Oxygen Concentration (%): 28 (01/08/22 1900)    Lines/Drains/Airways     Drain                 Gastrostomy/Enterostomy 12/27/21 1152 Percutaneous endoscopic gastrostomy (PEG) LUQ 12 days         Closed/Suction Drain 01/06/22 Right Thigh Bulb 15 Fr. 3 days         Closed/Suction Drain 01/06/22 Right;Anterior Thigh Bulb 15 Fr. 3 days         Closed/Suction Drain 01/06/22 1521 Right Neck Bulb 15 Fr. 2 days         Closed/Suction Drain 01/06/22 1524 Left Neck Bulb 15 Fr. 2 days          Airway                 Laryngectomy (Do Not Remove) 01/06/22 1655 Laryngectomy tube 2 days          Peripheral Intravenous Line                 Midline Catheter Insertion/Assessment  - Single Lumen 01/02/22 1410 Right basilic vein (medial side of arm) other (see comments) 6 days         Peripheral IV - Single Lumen 01/06/22 0853 18 G Right Hand 3 days                Significant Labs:    CBC/Anemia Profile:  Recent Labs   Lab 01/08/22  0218 01/09/22  0327   WBC 13.85* 11.09   HGB 9.7* 9.3*   HCT 29.7* 28.8*    431   MCV 87 87   RDW 13.4 13.1        Chemistries:  Recent Labs   Lab 01/08/22  0218 01/09/22  0327   * 134*   K 4.1 3.8    101   CO2 24 24   BUN 24* 24*   CREATININE 0.7 0.7   CALCIUM 7.5* 7.7*   MG 1.8 1.8   PHOS 3.3 3.2       All pertinent  labs within the past 24 hours have been reviewed.    Significant Imaging:  I have reviewed all pertinent imaging results/findings within the past 24 hours.    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  Patient is admitted HDS and on trach collar.       Neuro/Psych:   -- RASS , CAM-ICU  -- PRN tylenol, oxycodone 5mg solution, and breakthrough IV dilaudid      Cardiovascular:   #Atrial fibrillation   -- Continue home Diltiazem 30mg q6h   -- HOLD home eliquis for at least 2 days per ENT.  Discussed with ENT resident, and he will discuss with staff on when ok to restart    -- Hemodynamics:  -- MAP goal >65  -- Currently HDS on no pressor support     Pulmonary:   #S/p Laryngectomy and total neck dissection on 1/6/22  -- S/p trach on 12/27/21  -- now on RA     Renal   -- Cr (Baseline): 0.7  -- BUN/Cr 37  BUN   Date Value Ref Range Status   01/09/2022 24 (H) 8 - 23 mg/dL Final   01/08/2022 24 (H) 8 - 23 mg/dL Final     Creatinine   Date Value Ref Range Status   01/09/2022 0.7 0.5 - 1.4 mg/dL Final   01/08/2022 0.7 0.5 - 1.4 mg/dL Final     -- Adequate urine output     FEN / GI:   -- advance TFs as tolerated  -- Ok to use G tube for medication use  -- No BM since Yassine per the patient.    -- continue bowel regimen      -- Electrolytes:   Recent Labs   Lab 01/09/22  0327   *   K 3.8      CO2 24   BUN 24*   CREATININE 0.7   MG 1.8      -- Nutrition:   -- replace electrolytes as needed to keep K>4, Mg>2  -- bowel reg - daily miralax, docusate  -- famotidine for GI prophylaxis     ID:   -- Tm: afebrile  -- WBC , trend daily     Heme/Onc:   -- H/H stable  -- past products received: none  Lab Results   Component Value Date    WBC 11.09 01/09/2022    HGB 9.3 (L) 01/09/2022    HCT 28.8 (L) 01/09/2022     MCV 87 01/09/2022     01/09/2022        Endo:   #S/p thyroidectomy   -- Trend ionized Ca2+ q8h  -- continue 1500mg of Ca2+ q8h  -- continue calcitrol   -- continue levothyroxine    #DM2  -- endocrine following, appreciate recommendations  -- insulin ggt     PPx:   Feeding: Regular diet / NPO strict. TF adv as tolerated  Analgesia/Sedation: none  Thromboembolic prevention:LVX / SCD  HOB 30  Stress Ulcer ppx: famotidine  Glucose control: Critical care goal 140-180 g/dl  Turn q2 hrs     Dispo/Code Status/Palliative:   -- continue care in the SICU           Critical care was time spent personally by me on the following activities: development of treatment plan with patient or surrogate and bedside caregivers, discussions with consultants, evaluation of patient's response to treatment, examination of patient, ordering and performing treatments and interventions, ordering and review of laboratory studies, ordering and review of radiographic studies, pulse oximetry, re-evaluation of patient's condition.  This critical care time did not overlap with that of any other provider or involve time for any procedures.     Anup Jose MD  Critical Care - Surgery  [facility]

## 2022-01-09 NOTE — PLAN OF CARE
Problem: Physical Therapy Goal  Goal: Physical Therapy Goal  Description: Goals to be met by: 2022     Patient will increase functional independence with mobility by performin. Sit to stand transfer with [place]  2. Gait  x 300 feet with Modified [place] using least restrictive assistive device.   3. Ascend/descend 1 step without hand rail with supervision   Outcome: Ongoing, Progressing     PT evaluated pt and established goals for pt today.

## 2022-01-09 NOTE — PLAN OF CARE
Problem: Physical Therapy Goal  Goal: Physical Therapy Goal  Outcome: Met Cyrus Batres transferred with stand-by assistance. He ambulated 240 ft with IV pole in left hand and stand-by assistance. He ambulated steadily without loss of balance. He does not need acute physical therapy services at this time. He will benefit from ambulating in room and hallway with supervision from nursing staff. Discharge acute PT services.     1/9/2022

## 2022-01-09 NOTE — PT/OT/SLP EVAL
Physical Therapy Re-Evaluation    Patient Name:  Cyrus Batres Jr.   MRN:  [id number]  Admit Date: 12/25/2021  Admitting Diagnosis:  Larynx neoplasm malignant  Recent Surgery: Procedure(s) (LRB):  LARYNGECTOMY (N/A)  DISSECTION, NECK (Bilateral)  CREATION, FREE FLAP (Right)  THYROIDECTOMY  LARYNGOSCOPY (N/A)  REIMPLANTATION, PARATHYROID TISSUE (N/A) 3 Days Post-Op  Length of Stay: 15 days    Recommendations:     Discharge Recommendations:  home   Discharge Equipment Recommendations: none   Barriers to discharge: decline in functional mobility     Assessment:     Cyrus Batres Jr. is a 71 y.o. male admitted to [facility] on 12/25/2021 with a medical diagnosis of Larynx neoplasm malignant. Patient is POD s/p laryngectomy, bilateral neck dissection, thyroidectomy, and parathyroid tissue reimplantation. Patient performed transfers with stand-by assistance and ambulated 240 ft with IV pole in left hand with stand-by assistance. He presents with the following impairments/functional limitations: weakness,impaired functional mobilty,gait instability,pain,decreased lower extremity function. Patient will benefit from skilled acute physical therapy services to address the mentioned deficits in order to increase safety and independence with functional mobility.     Rehab Prognosis: Good     Plan:     During this hospitalization, patient to be seen 3 x/week to address the identified rehab impairments via   and progress towards the established goals.    · Plan of Care Expires:  02/08/22    Social History   Living Environment:   Pt lives with wife    Pt lives in a single story home with 1 steps to enter with no handrails    Pt has a tub/shower combo with shower chair    Prior Level of Function:    independent with mobility and ADLs    DME used: shower chair,cane, straight   Patient reports 0 falls.      Roles/Repsonsibilities:    Hobbies: watch football.    Upon discharge, patient will have assistance from: wife    Subjective  "  Communicated with RN prior to session.  Patient found sitting in chair with  pulse ox (continuous),telemetry,PICC line,PEG Tube,GLENN drain,bed alarm (laryngectomy) upon PT entry to room.  Patient is alone during session.  Pt is agreeable to evaluation.     Additional staff present:No       Pt's subjective: "I am watching football."    Pain/Comfort:  Pain Rating 1: 5/10  Location - Side 1: Right  Location - Orientation 1: anterior  Location 1: thigh  Pain Addressed 1: Reposition,Cessation of Activity  Pain Rating Post-Intervention 1: 5/10  Pain Rating 2: 4/10  Location - Orientation 2: anterior  Location 2: neck  Pain Addressed 2: Distraction,Cessation of Activity  Pain Rating Post-Intervention 2: 5/10      Objective:   General Precautions: Standard, fall,respiratory, neck-breather   Orthopedic Precautions:N/A   Braces: N/A   Oxygen Device: Room Air  Vitals: /79 (BP Location: Left arm, Patient Position: Lying)   Pulse 71   Temp 98.5 °F (36.9 °C) (Oral)   Resp 19   Ht 5' 6" (1.676 m)   Wt 66 kg (145 lb 8.1 oz)   SpO2 99%   BMI 23.48 kg/m²     Exams:  · Cognition:   · Alert and Cooperative  · AxOx4  · Command following: Follows multistep  commands  · Fluency: mouths words and writes on board to communicate  · Skin Integrity: staples and GLENN drains intact in bilareral neck and right anterior thigh  · Hearing: Intact  · Range of Motion:  · RLE: knee flexion limited by pain  · LLE: WFL  · Strength Exam:  · RLE Strength:   · Hip Flexion: limited by pain  · Knee Flexion: limited by pain   · Knee Extension: limited by pain     · LLE Strength: WFL    Outcome Measures:   AM-PAC 6 CLICK MOBILITY:   Turning over in bed (including adjusting bedclothes, sheets and blankets)?: 4  Sitting down on and standing up from a chair with arms (e.g., wheelchair, bedside commode, etc.): 4  Moving from lying on back to sitting on the side of the bed?: 4  Moving to and from a bed to a chair (including a wheelchair)?: 4  Need to " walk in hospital room?: 4  Climbing 3-5 steps with a railing?: 3   Basic Mobility Total Score: 23     Functional Mobility:    Bed Mobility:     Seated in chair at start of session and returned to chair    Transfers:     Sit to Stand:  stand by assistance    Stand to Sit: stand by assistance     Gait:    Patient ambulates 240 ft    Patient uses:  IV pole in left hand    Patient requires: standy by assistance   Gait Deviation(s): decreased step length, decreased clarissa and increased time in stance phase on unaffected leg   Impairments due to: pain   Comments: Patient ambulates steadily without loss of balance      Patient Education:   Patient educated on PT POC and role of PT in acute care.   Patient educated on the importance of early mobility to prevent functional decline during hospital stay   Patient was instructed to utilize staff assistance for mobility/transfers.   Patient is appropriate to transfer and ambulate with stand-by assistance with RN/PCT   White board updated to include patient's safest level of mobility with staff assistance   Patient educated on plan of care by explanation.  Patient was receptive to education and communicates understanding.       Patient left sitting in chair with all lines intact and call button in reach.      GOALS:     Problem: Physical Therapy Goal  Goal: Physical Therapy Goal  Description: Goals to be met by: 2022     Patient will increase functional independence with mobility by performin. Sit to stand transfer with [place]  2. Gait  x 300 feet with Modified [place] using least restrictive assistive device.   3. Ascend/descend 1 step without hand rail with supervision   Outcome: Ongoing, Progressing      History:     Past Medical History:   Diagnosis Date    Allergy     pollen extracts    Atrial fibrillation     Chronic anticoagulation     Diabetes mellitus, type 2     Hypertension     Larynx neoplasm malignant 2020       Past  Surgical History:   Procedure Laterality Date    DIRECT LARYNGOBRONCHOSCOPY N/A 12/27/2021    Procedure: LARYNGOSCOPY, DIRECT, WITH BRONCHOSCOPY;  Surgeon: Flex Espinosa MD;  [facility];  Service: ENT;  Laterality: N/A;    DISSECTION OF NECK Bilateral 1/6/2022    Procedure: DISSECTION, NECK;  Surgeon: Jesse James MD;  [facility];  Service: ENT;  Laterality: Bilateral;    FLAP PROCEDURE Right 1/6/2022    Procedure: CREATION, FREE FLAP;  Surgeon: Alise Hart MD;  [facility];  Service: ENT;  Laterality: Right;  Ischemic start 1351  Ischemic stop 1502    LARYNGECTOMY N/A 1/6/2022    Procedure: LARYNGECTOMY;  Surgeon: Jesse James MD;  [facility];  Service: ENT;  Laterality: N/A;    LARYNGOSCOPY N/A 8/4/2020    Procedure: Suspension microlaryngoscopy with biopsy, possible KTP laser treatment/excision;  Surgeon: Stew Noel MD;  [facility];  Service: ENT;  Laterality: N/A;  Microscope, telescopes, tower, microinstruments, KTP laser, rep conf# [id number] IC 7/28.    LARYNGOSCOPY N/A 3/16/2021    Procedure: Suspension microlaryngoscopy with excision of lesion, possible CO2 laser;  Surgeon: Stew Noel MD;  [facility];  Service: ENT;  Laterality: N/A;  Microscope, telescopes, tower, microinstruments, CO2 laser, rep conf# [id number] IC 3/4.    LARYNGOSCOPY N/A 4/1/2021    Procedure: Suspension microlaryngoscopy with KTP laser excision of lesion;  Surgeon: Stew Noel MD;  [facility];  Service: ENT;  Laterality: N/A;  Microscope, telescopes, tower, microinstruments, 70 degree scope, vocal fold , KTP laser, rep conf# [id number] BC    LARYNGOSCOPY N/A 12/9/2021    Procedure: Suspension microlaryngoscopy with biopsy;  Surgeon: Stew Noel MD;  Location: [facility] OR 2ND FLR;  Service: ENT;  Laterality: N/A;  Microscope, telescopes, tower, microinstruments     LARYNGOSCOPY N/A 1/6/2022    Procedure: LARYNGOSCOPY;  Surgeon: Jesse James MD;  [facility];  Service: ENT;  Laterality: N/A;    MICROLARYNGOSCOPY N/A 3/17/2020    Procedure: MICROLARYNGOSCOPY;  Surgeon: Jung Xiao MD;  Location: [facility];  Service: ENT;  Laterality: N/A;  Laser Microlaryngoscopy  NEED TO SCHEDULE LASER from [facility] [id number]    REIMPLANTATION OF PARATHYROID TISSUE N/A 1/6/2022    Procedure: REIMPLANTATION, PARATHYROID TISSUE;  Surgeon: Jesse James MD;  [facility];  Service: ENT;  Laterality: N/A;    THYROIDECTOMY  1/6/2022    Procedure: THYROIDECTOMY;  Surgeon: Jesse James MD;  [facility];  Service: ENT;;    TRACHEOSTOMY N/A 12/27/2021    Procedure: CREATION, TRACHEOSTOMY;  Surgeon: Flex Espinosa MD;  [facility];  Service: ENT;  Laterality: N/A;       Time Tracking:     PT Received On: 01/09/22  PT Start Time: 1343     PT Stop Time: 1410  PT Total Time (min): 27 min     Billable Minutes: Re-eval 1 procedure

## 2022-01-09 NOTE — PLAN OF CARE
Problem: Occupational Therapy Goal  Goal: Occupational Therapy Goal  Description: Goals to be met by: 7 days 1/16/22     Patient will increase functional independence with ADLs by performing:    Pt to complete UE dressing with set-up  Pt to complete LE dressing with Set-up  Pt to complete standing g/h skills with supervision.  Pt to complete t/f to commode with supervision.  Pt to complete toileting with supervision.   Outcome: Ongoing, Progressing

## 2022-01-09 NOTE — PROGRESS NOTES
[facility]  Endocrinology  Progress Note    Admit Date: 12/25/2021     Reason for Consult: Management of T2DM, Hyperglycemia     Surgical Procedure and Date:   12/27/21  CREATION, TRACHEOSTOMY (N/A)  LARYNGOSCOPY, DIRECT, WITH BRONCHOSCOPY (N/A)  INSERTION, PEG TUBE (N/A)     Diabetes diagnosis year: 2010    Home Diabetes Medications:  Ozempic stopped on 10/18/21 by Dena Silveira as a1c below goal     How often checking glucose at home?  Once daily    BG readings on regimen: 150s  Hypoglycemia on the regimen?  No  Missed doses on regimen?  n/a    Diabetes Complications include:     None     Complicating diabetes co morbidities:   pAfib, HTN, active cancer       HPI:   Patient is a 71 y.o. 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 He presented as a transfer from [place] for ENT evaluation. He initially presented to the ER for worsening hemoptysis. Laryngeal videostroboscopy 11/8/21 notable for webbing across membranous vocal folds, granular changes infraglottically, firbinous debris across anterior commissure, post surgical stiffness of bilateral true vocal folds, phonatory supraglottic hyperfunction, occasional plica ventricularis, thick salivary secretions, pooled in pyriforms, diffuse mild laryngopharyngeal edema.He is now s/p the above procedure. He was last seen by MISSY Gutierrez on 10/18/21 for DM management. Endocrinology consulted for management of T2DM.            Lab Results   Component Value Date    HGBA1C 5.5 10/05/2021           Interval HPI:   Overnight events:   BG stable while on low dose IV insulin/SQ regimen. TF are currently at trickle rate. Expect increase in insulin requirements with increase in enteral nutrition rates. Endo will continue to follow, and manage glycemic control while inpatient.   Diet NPO  3 Days Post-Op    Eating:   NPO  Nausea: No  Hypoglycemia and  "intervention: No  Fever: No  TPN and/or TF: No  If yes, type of TF/TPN and rate: None    /70 (BP Location: Left arm, Patient Position: Lying)   Pulse 71   Temp 99.2 °F (37.3 °C) (Oral)   Resp 17   Ht 5' 6" (1.676 m)   Wt 66 kg (145 lb 8.1 oz)   SpO2 97%   BMI 23.48 kg/m²     Labs Reviewed and Include    Recent Labs   Lab 01/09/22  0327   *   CALCIUM 7.7*   *   K 3.8   CO2 24      BUN 24*   CREATININE 0.7     Lab Results   Component Value Date    WBC 11.09 01/09/2022    HGB 9.3 (L) 01/09/2022    HCT 28.8 (L) 01/09/2022    MCV 87 01/09/2022     01/09/2022     No results for input(s): TSH, FREET4 in the last 168 hours.  Lab Results   Component Value Date    HGBA1C 5.9 (H) 01/05/2022       Nutritional status:   Body mass index is 23.48 kg/m².  Lab Results   Component Value Date    ALBUMIN 1.9 (L) 01/07/2022    ALBUMIN 2.6 (L) 01/06/2022    ALBUMIN 2.8 (L) 01/05/2022     No results found for: PREALBUMIN    Estimated Creatinine Clearance: 88.6 mL/min (based on SCr of 0.7 mg/dL).    Accu-Checks  Recent Labs     01/07/22  1633 01/07/22  2034 01/08/22  0158 01/08/22  0533 01/08/22  0812 01/08/22  1217 01/08/22  1959 01/08/22  2352 01/09/22  0330 01/09/22  0830   POCTGLUCOSE 183* 154* 146* 133* 137* 139* 142* 161* 142* 150*       Current Medications and/or Treatments Impacting Glycemic Control  Immunotherapy:    Immunosuppressants     None        Steroids:   Hormones (From admission, onward)            None        Pressors:    Autonomic Drugs (From admission, onward)            None        Hyperglycemia/Diabetes Medications:   Antihyperglycemics (From admission, onward)            Start     Stop Route Frequency Ordered    01/08/22 1315  insulin regular in 0.9 % NaCl 100 unit/100 mL (1 unit/mL) infusion        Question:  Enter initial dose (Units/hr):  Answer:  0.2    -- IV Continuous 01/08/22 1305    01/07/22 1149  insulin aspart U-100 pen 0-5 Units         -- SubQ As needed (PRN) " 01/07/22 1049          ASSESSMENT and PLAN    * Larynx neoplasm malignant  Managed per primary team  Optimize BG control        Type 2 diabetes mellitus with hyperglycemia, without long-term current use of insulin  BG goal 140-180    -Continue transition IV insulin infusion with stepdown parameters. Initial rate starting at 0.2 units/hr.   - Low Dose SQ Insulin Correction Scale.  - BG monitoring every 4 hours while NPO.     ** Please call Endocrine for any BG related issues **  ** Please notify Endocrine for any change and/or advance in diet**    Lab Results   Component Value Date    HGBA1C 5.9 (H) 01/05/2022     Discharge Planning:   TBD. Please notify endocrinology prior to discharge.         Hyperlipidemia  Managed per primary team  Condition may cause insulin resistance         Paroxysmal atrial fibrillation  Managed per primary team  Condition may cause insulin resistance         Adverse effect of adrenal cortical steroids, sequela  On steroid therapy per transplant team; may elevate BG readings        On enteral nutrition  Enteral nutrition may increase blood glucose.  Optimize BG control            Vargas Cortez NP  Endocrinology  [facility]

## 2022-01-09 NOTE — SUBJECTIVE & OBJECTIVE
Interval History/Significant Events:   - NAEON.  On RA  - Pain is still well controlled with current regimen.  - ENT to back off flap checks to q2h.      Follow-up For: Procedure(s) (LRB):  LARYNGECTOMY (N/A)  DISSECTION, NECK (Bilateral)  CREATION, FREE FLAP (Right)  THYROIDECTOMY  LARYNGOSCOPY (N/A)  REIMPLANTATION, PARATHYROID TISSUE (N/A)    Post-Operative Day: 3 Days Post-Op    Objective:     Vital Signs (Most Recent):  Temp: 99.2 °F (37.3 °C) (01/08/22 2300)  Pulse: 67 (01/09/22 0800)  Resp: 17 (01/09/22 0800)  BP: 134/69 (01/09/22 0800)  SpO2: 98 % (01/09/22 0800) Vital Signs (24h Range):  Temp:  [99.2 °F (37.3 °C)] 99.2 °F (37.3 °C)  Pulse:  [65-83] 67  Resp:  [14-25] 17  SpO2:  [96 %-99 %] 98 %  BP: (117-144)/(65-77) 134/69     Weight: 66 kg (145 lb 8.1 oz)  Body mass index is 23.48 kg/m².      Intake/Output Summary (Last 24 hours) at 1/9/2022 0857  Last data filed at 1/9/2022 0700  Gross per 24 hour   Intake 408.62 ml   Output 764 ml   Net -355.38 ml       Physical Exam  Constitutional:       General: He is not in acute distress.  HENT:      Head: Normocephalic.      Nose: Nose normal.      Mouth/Throat:      Mouth: Mucous membranes are dry.   Eyes:      Extraocular Movements: Extraocular movements intact.      Pupils: Pupils are equal, round, and reactive to light.   Neck:      Comments: Neck drains with light bloody output  Free flap with good pulsation on doppler.   Cardiovascular:      Rate and Rhythm: Normal rate and regular rhythm.      Pulses: Normal pulses.      Heart sounds: Normal heart sounds.   Pulmonary:      Effort: Pulmonary effort is normal. No respiratory distress.      Breath sounds: Normal breath sounds.      Comments: Paul tube with trach collar   Abdominal:      General: Abdomen is flat. Bowel sounds are normal. There is no distension.      Palpations: Abdomen is soft.   Musculoskeletal:      Comments: RLE excision for flap with 1x drain in place   Skin:     General: Skin is warm and  dry.   Neurological:      General: No focal deficit present.      Mental Status: He is alert.   Psychiatric:         Mood and Affect: Mood normal.         Vents:  Oxygen Concentration (%): 28 (01/08/22 1900)    Lines/Drains/Airways     Drain                 Gastrostomy/Enterostomy 12/27/21 1152 Percutaneous endoscopic gastrostomy (PEG) LUQ 12 days         Closed/Suction Drain 01/06/22 Right Thigh Bulb 15 Fr. 3 days         Closed/Suction Drain 01/06/22 Right;Anterior Thigh Bulb 15 Fr. 3 days         Closed/Suction Drain 01/06/22 1521 Right Neck Bulb 15 Fr. 2 days         Closed/Suction Drain 01/06/22 1524 Left Neck Bulb 15 Fr. 2 days          Airway                 Laryngectomy (Do Not Remove) 01/06/22 1655 Laryngectomy tube 2 days          Peripheral Intravenous Line                 Midline Catheter Insertion/Assessment  - Single Lumen 01/02/22 1410 Right basilic vein (medial side of arm) other (see comments) 6 days         Peripheral IV - Single Lumen 01/06/22 0853 18 G Right Hand 3 days                Significant Labs:    CBC/Anemia Profile:  Recent Labs   Lab 01/08/22  0218 01/09/22  0327   WBC 13.85* 11.09   HGB 9.7* 9.3*   HCT 29.7* 28.8*    431   MCV 87 87   RDW 13.4 13.1        Chemistries:  Recent Labs   Lab 01/08/22  0218 01/09/22  0327   * 134*   K 4.1 3.8    101   CO2 24 24   BUN 24* 24*   CREATININE 0.7 0.7   CALCIUM 7.5* 7.7*   MG 1.8 1.8   PHOS 3.3 3.2       All pertinent labs within the past 24 hours have been reviewed.    Significant Imaging:  I have reviewed all pertinent imaging results/findings within the past 24 hours.

## 2022-01-09 NOTE — ASSESSMENT & PLAN NOTE
BG goal 140-180    -Continue transition IV insulin infusion with stepdown parameters. Initial rate starting at 0.2 units/hr.   - Low Dose SQ Insulin Correction Scale.  - BG monitoring every 4 hours while NPO.     ** Please call Endocrine for any BG related issues **  ** Please notify Endocrine for any change and/or advance in diet**    Lab Results   Component Value Date    HGBA1C 5.9 (H) 01/05/2022     Discharge Planning:   TBD. Please notify endocrinology prior to discharge.

## 2022-01-09 NOTE — OP NOTE
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 Diagnostic direct laryngoscopy  2. Bilateral modified neck dissection of levels 2 through 4  3. Total laryngectomy  4. Total thyroidectomy with bilateral paratracheal/upper mediastinal neck dissection  5. Autotransplantation of left inferior parathyroid into left sternocleidomastoid muscle     ANESTHESIA: General      INDICATIONS FOR PROCEDURE:   Cyrus Batres Jr. is a 71 y.o. 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  He recently presented with evidence of progressive disease involving the subglottis with extra laryngeal extension.  He was admitted roughly 2 weeks ago for hemoptysis and airway obstruction and underwent tracheostomy.  He presents today for total laryngectomy.  Anticipated need for resection of the overlying cervical skin so enlisted the services of my colleague, Dr. Hart, to assist with reconstruction.    He was apprised of the risks, benefits and alternatives to surgery.  In spite of the risk inherent to surgery,he provided informed consent for the aforementioned procedures.     PROCEDURE IN DETAIL:  The patient was taken to the operating room and placed on the operating table in the supine position.  General endotracheal anesthesia was induced by the anesthesia team.     To begin, laryngoscopy was performed.  The upper teeth protected with a mouth guard.  The Dedo laryngoscope was inserted through the mouth and advanced to the level of the larynx.  The piriform sinuses, postcricoid region vallecular were noted to be clear.  There was tumor at the level of the glottis extending into  the subglottis.  Next, an apron incision was marked on the neck.  Trach site was resected along with a margin of skin.  There was no direct involvement with the tumor appeared to abut the underlying skin roughly at the level of the trach site.  The intended incisions were injected with several cc of 1% lidocaine with epinephrine.  The neck, chest and  lower extremities were then prepped and draped in the standard sterile fashion.    To begin, a 15 blade was utilized to incise the skin.  Sharp dissection proceeded through the underlying subcutaneous tissue and platysma.  Superiorly and inferiorly-based subplatysmal flaps were elevated from the level of the mandible and mastoid superiorly to the level of the clavicle inferiorly.  Dissection began on the left side.    To begin, the sternocleidomastoid muscle was skeletonized along its full length anteriorly.  The sternal head of the sternocleidomastoid muscle was circumferentially dissected and divided utilizing the LigaSure.  Dissection proceeded superiorly to identify the spinal accessory nerve which was traced cephalad the skull base.  The fibrofatty tissue of level 2b was transected down to the underlying deep cervical fascia.  It was  from the internal jugular vein and swept deep to the spinal accessory nerve remaining in continuity with the remainder of the neck dissection.  The fibrofatty tissue of levels 2A and 3 were then transected down to the underlying deep cervical fascia and cervical rootlets.  The omohyoid muscle was circumferentially dissected and divided with the LigaSure.  The level 4 contents were transected down to the underlying deep cervical fascia and transverse cervical vessels.  The phrenic nerve was identified and preserved.  The specimen was freed from the lateral border of the internal jugular vein utilizing blunt dissection.  Several pedicles were taken and divided with the LigaSure order to prevent a Chyle leak.  The specimen was  elevated from posterior to anteriorly off of the underlying cervical rootlets and deep cervical fascia.  The carotid sheath was then entered the specimen was freed the carotid sheath.  It was amputated and sent to pathology for permanent analysis.    An identical procedure was carried out on the right side.  Again the specimen was sent to pathology for permanent analysis.    Next, our attention was turned to the laryngectomy.  The carotid sheath was identified bilaterally.  The common carotid artery was skeletonized and dissected along its full length freeing the larynx and trachea from the carotid sheath.  The thyroid vessels were circumferentially dissected and divided.  The superior laryngeal neurovascular bundle was handled in a similar manner.  The constrictors were freed from the larynx utilizing the Bovie.  An extensive paratracheal neck dissection was carried out bilaterally.  The fibrofatty contents of level 6 bilaterally refer freed from the innominate artery inferiorly and the esophagus/prevertebral fascia on the deep surface.  These nodes remained in continuity with the specimen.  The left inferior parathyroid gland was identified.  It was removed and frozen section was sent to confirm its identity.  The frozen returned consistent with parathyroid.  The gland was then morcellized and placed into a pocket within the left sternocleidomastoid muscle which was closed with Prolene suture.  Next, dissection returned superiorly the hyoid bone was skeletonized along its superior border.  Care was taken to hug the hyoid bone tightly to protect the hypoglossal nerve.  Dissection then returned inferiorly.  The airway was then entered just below the tracheostomy site.  We were noted to be roughly 2 cm away from visible tumor.  The party wall was then opened utilizing the Bovie.  Dissection proceeded from inferior to superior  specimen the party wall.  Dissection proceeded superiorly to enter the pharynx  through the vallecula.  The epiglottis was grasped and the pharynx was divided along the aryepiglottic fold bilaterally.  The postcricoid mucosa was then elevated off of the underlying larynx and divided.  The party wall attachments were then taken down and the specimen was freed.  The pharyngeal margins were grossly clear.  Frozen sections were taken from the anterior and posterior trachea and were found to be remarkable for high-grade dysplasia.  Additional margins were then taken from the tracheal stump which were found to be negative for carcinoma or dysplasia.    The stoma was then fashioned utilizing 3-0 Vicryl placed as half mattress sutures along the anterior tracheal wall.  The pharynx was then closed with interrupted 3-0 Vicryl placed as Nelson sutures.      The pharynx was then tested for water tightness with dilute methylene blue.  There were no leaks identified.  The patient was then handed over to Dr. Hart for reconstruction.    There were no intraoperative complications.  I was present for and participated in the entire procedure as dictated above.       ESTIMATED BLOOD LOSS:100 mL    SPECIMENS:   Specimen (24h ago, onward)    1. Left neck dissection levels 2, 3, and 4 - Permanent 2. Right neck dissection levels 2, 3, and 4 - Permanent 3. Rule out parathyroid - Frozen 4. Total laryngectomy, total thyroidectomy, bilateral level 6 neck dissection - Permanent 5. Posterior tracheal margin - Frozen 6. Anterior tracheal margin - Frozen 7. Posterior tracheal margin #2 - Frozen 8. Anterior tracheal margin #2 - Frozen

## 2022-01-09 NOTE — ASSESSMENT & PLAN NOTE
"- Ok for stepdwon to PCU  Continue q2h flap checks: Record Doppler Pulses (artery and vein) ,Capillary Refill , Color, Turgor, temperature.   - Neurovascular Checks q2h of bilateral upper and lower extremities   - Keep head elevated and in neutral position  - Sign above bed that reads "No circumferential Neck Ties"   - FOR ANY FLAP ISSUES, PLEASE PHONE ENT RESIDENT ON CALL.  - Please label drains carefully and document accordingly  - Maintain xeroform in left ear canal  - Bacitracin twice daily to incision line     Neuro:  - Alert, oriented. Pain medication PRN.      CV:  - HDS. SBP > 90. A-line can be removed  - continue with q1h flap checks     Pulm:  - stable     GI/FEN/Lytes:  - Strict NPO  - monitor calcium closely and replete prn  - start trickle TFs today  - replace lytes prn     Renal:  - UOP adequate. Juarez out.      Heme/ID:  - cont to trend HH  - cont Unasyn     Endo:  - Q6 acuchecks, SSI.      PPX:  PT/OT  L40  PPI     Dispo:   Can likely step down to PCU today                  "

## 2022-01-09 NOTE — ASSESSMENT & PLAN NOTE
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  Patient is admitted HDS and on trach collar.       Neuro/Psych:   -- RASS , CAM-ICU  -- PRN tylenol, oxycodone 5mg solution, and breakthrough IV dilaudid      Cardiovascular:   #Atrial fibrillation   -- Continue home Diltiazem 30mg q6h   -- HOLD home eliquis for at least 2 days per ENT.  Discussed with ENT resident, and he will discuss with staff on when ok to restart    -- Hemodynamics:  -- MAP goal >65  -- Currently HDS on no pressor support     Pulmonary:   #S/p Laryngectomy and total neck dissection on 1/6/22  -- S/p trach on 12/27/21  -- now on RA     Renal   -- Cr (Baseline): 0.7  -- BUN/Cr 37  BUN   Date Value Ref Range Status   01/09/2022 24 (H) 8 - 23 mg/dL Final   01/08/2022 24 (H) 8 - 23 mg/dL Final     Creatinine   Date Value Ref Range Status   01/09/2022 0.7 0.5 - 1.4 mg/dL Final   01/08/2022 0.7 0.5 - 1.4 mg/dL Final     -- Adequate urine output     FEN / GI:   -- advance TFs as tolerated  -- Ok to use G tube for medication use  -- No BM since Yassine per the patient.    -- continue bowel regimen      -- Electrolytes:   Recent Labs   Lab 01/09/22  0327   *   K 3.8      CO2 24   BUN 24*   CREATININE 0.7   MG 1.8      -- Nutrition:   -- replace electrolytes as needed to keep K>4, Mg>2  -- bowel reg - daily miralax, docusate  -- famotidine for GI prophylaxis     ID:   -- Tm: afebrile  -- WBC , trend daily     Heme/Onc:   -- H/H stable  -- past products received: none  Lab Results   Component Value Date    WBC 11.09 01/09/2022    HGB 9.3 (L) 01/09/2022    HCT 28.8 (L) 01/09/2022    MCV 87 01/09/2022     01/09/2022        Endo:   #S/p thyroidectomy   -- Trend ionized Ca2+ q8h  -- continue 1500mg of Ca2+ q8h  -- continue calcitrol   -- continue levothyroxine    #DM2  -- endocrine  following, appreciate recommendations  -- insulin ggt     PPx:   Feeding: Regular diet / NPO strict. TF adv as tolerated  Analgesia/Sedation: none  Thromboembolic prevention:LVX / SCD  HOB 30  Stress Ulcer ppx: famotidine  Glucose control: Critical care goal 140-180 g/dl  Turn q2 hrs     Dispo/Code Status/Palliative:   -- continue care in the SICU

## 2022-01-09 NOTE — SUBJECTIVE & OBJECTIVE
Interval History: No issues overnight.     Medications:  Continuous Infusions:   insulin regular 1 units/mL infusion orderable (TRANSFER) 0.2 Units/hr (01/09/22 1100)     Scheduled Meds:   ampicillin-sulbactim (UNASYN) IVPB  3 g Intravenous Q8H    calcitrioL  0.5 mcg Per G Tube BID    calcium carbonate  1,500 mg Per G Tube TID    diltiaZEM  30 mg Per G Tube Q6H    docusate  100 mg Per G Tube BID    enoxaparin  40 mg Subcutaneous Daily    famotidine (PF)  20 mg Intravenous BID    levothyroxine  112 mcg Per G Tube Before breakfast    sennosides 8.8 mg/5 ml  5 mL Per G Tube Daily     PRN Meds:dextrose 50%, dextrose 50%, glucagon (human recombinant), glucose, glucose, HYDROmorphone, insulin aspart U-100, magnesium oxide, magnesium oxide, oxyCODONE, oxyCODONE, potassium bicarbonate, potassium bicarbonate, potassium bicarbonate, potassium, sodium phosphates, potassium, sodium phosphates, potassium, sodium phosphates     Review of patient's allergies indicates:   Allergen Reactions    Pollen extracts     Lovastatin Rash     Not confirmed but pt skeptical     Objective:     Vital Signs (24h Range):  Temp:  [99.2 °F (37.3 °C)] 99.2 °F (37.3 °C)  Pulse:  [65-83] 68  Resp:  [14-25] 17  SpO2:  [96 %-99 %] 98 %  BP: (111-144)/(65-77) 115/66     Date 01/09/22 0700 - 01/10/22 0659   Shift 0760-7606 8645-2781 5184-4341 24 Hour Total   INTAKE   I.V.(mL/kg) 0.2(0)   0.2(0)   NG/GT 30   30   Shift Total(mL/kg) 30.2(0.5)   30.2(0.5)   OUTPUT   Drains 15   15   Shift Total(mL/kg) 15(0.2)   15(0.2)   Weight (kg) 66 66 66 66     Lines/Drains/Airways     Drain                 Gastrostomy/Enterostomy 12/27/21 1152 Percutaneous endoscopic gastrostomy (PEG) LUQ 12 days         Closed/Suction Drain 01/06/22 Right Thigh Bulb 15 Fr. 3 days         Closed/Suction Drain 01/06/22 Right;Anterior Thigh Bulb 15 Fr. 3 days         Closed/Suction Drain 01/06/22 1521 Right Neck Bulb 15 Fr. 2 days         Closed/Suction Drain 01/06/22 1524  Left Neck Bulb 15 Fr. 2 days          Airway                 Laryngectomy (Do Not Remove) 01/06/22 1655 Laryngectomy tube 2 days          Peripheral Intravenous Line                 Midline Catheter Insertion/Assessment  - Single Lumen 01/02/22 1410 Right basilic vein (medial side of arm) other (see comments) 6 days         Peripheral IV - Single Lumen 01/06/22 0853 18 G Right Hand 3 days                Physical Exam   Awake, alert, nad  EOMI  Neck - soft, incision c/d/i bilaterally  Flap - good color/turgor, strong triphasic doppler, cap refill appropriate  GLENN neck and leg with ss output  AAOx3, communicates with gestures     Significant Labs:  CBC:   Recent Labs   Lab 01/09/22  0327   WBC 11.09   RBC 3.31*   HGB 9.3*   HCT 28.8*      MCV 87   MCH 28.1   MCHC 32.3     CMP:   Recent Labs   Lab 01/07/22  0332 01/08/22  0218 01/09/22  0327   *   < > 123*   CALCIUM 6.8*   < > 7.7*   ALBUMIN 1.9*  --   --    PROT 4.3*  --   --    *   < > 134*   K 5.0   < > 3.8   CO2 20*   < > 24      < > 101   BUN 33*   < > 24*   CREATININE 0.8   < > 0.7   ALKPHOS 126  --   --    ALT 19  --   --    AST 36  --   --    BILITOT 0.6  --   --     < > = values in this interval not displayed.       Significant Diagnostics:  I have reviewed and interpreted all pertinent imaging results/findings within the past 24 hours.

## 2022-01-10 LAB
ANION GAP SERPL CALC-SCNC: 9 MMOL/L (ref 8–16)
BASOPHILS # BLD AUTO: 0.03 K/UL (ref 0–0.2)
BASOPHILS NFR BLD: 0.3 % (ref 0–1.9)
BUN SERPL-MCNC: 22 MG/DL (ref 8–23)
CA-I BLDV-SCNC: 0.91 MMOL/L (ref 1.06–1.42)
CA-I BLDV-SCNC: 1.09 MMOL/L (ref 1.06–1.42)
CA-I BLDV-SCNC: 1.13 MMOL/L (ref 1.06–1.42)
CALCIUM SERPL-MCNC: 8.1 MG/DL (ref 8.7–10.5)
CHLORIDE SERPL-SCNC: 102 MMOL/L (ref 95–110)
CO2 SERPL-SCNC: 26 MMOL/L (ref 23–29)
CREAT SERPL-MCNC: 0.7 MG/DL (ref 0.5–1.4)
DIFFERENTIAL METHOD: ABNORMAL
EOSINOPHIL # BLD AUTO: 0.1 K/UL (ref 0–0.5)
EOSINOPHIL NFR BLD: 1.6 % (ref 0–8)
ERYTHROCYTE [DISTWIDTH] IN BLOOD BY AUTOMATED COUNT: 12.9 % (ref 11.5–14.5)
EST. GFR  (AFRICAN AMERICAN): >60 ML/MIN/1.73 M^2
EST. GFR  (NON AFRICAN AMERICAN): >60 ML/MIN/1.73 M^2
GLUCOSE SERPL-MCNC: 148 MG/DL (ref 70–110)
HCT VFR BLD AUTO: 27.9 % (ref 40–54)
HGB BLD-MCNC: 9.2 G/DL (ref 14–18)
IMM GRANULOCYTES # BLD AUTO: 0.07 K/UL (ref 0–0.04)
IMM GRANULOCYTES NFR BLD AUTO: 0.8 % (ref 0–0.5)
LYMPHOCYTES # BLD AUTO: 0.6 K/UL (ref 1–4.8)
LYMPHOCYTES NFR BLD: 6.4 % (ref 18–48)
MAGNESIUM SERPL-MCNC: 1.9 MG/DL (ref 1.6–2.6)
MCH RBC QN AUTO: 28.5 PG (ref 27–31)
MCHC RBC AUTO-ENTMCNC: 33 G/DL (ref 32–36)
MCV RBC AUTO: 86 FL (ref 82–98)
MONOCYTES # BLD AUTO: 0.5 K/UL (ref 0.3–1)
MONOCYTES NFR BLD: 5.8 % (ref 4–15)
NEUTROPHILS # BLD AUTO: 7.3 K/UL (ref 1.8–7.7)
NEUTROPHILS NFR BLD: 85.1 % (ref 38–73)
NRBC BLD-RTO: 0 /100 WBC
PHOSPHATE SERPL-MCNC: 4 MG/DL (ref 2.7–4.5)
PLATELET # BLD AUTO: 433 K/UL (ref 150–450)
PMV BLD AUTO: 9.2 FL (ref 9.2–12.9)
POCT GLUCOSE: 144 MG/DL (ref 70–110)
POCT GLUCOSE: 155 MG/DL (ref 70–110)
POCT GLUCOSE: 163 MG/DL (ref 70–110)
POCT GLUCOSE: 164 MG/DL (ref 70–110)
POCT GLUCOSE: 177 MG/DL (ref 70–110)
POCT GLUCOSE: 189 MG/DL (ref 70–110)
POTASSIUM SERPL-SCNC: 4.1 MMOL/L (ref 3.5–5.1)
RBC # BLD AUTO: 3.23 M/UL (ref 4.6–6.2)
SODIUM SERPL-SCNC: 137 MMOL/L (ref 136–145)
WBC # BLD AUTO: 8.6 K/UL (ref 3.9–12.7)

## 2022-01-10 PROCEDURE — 25000003 PHARM REV CODE 250: Mod: HCNC | Performed by: STUDENT IN AN ORGANIZED HEALTH CARE EDUCATION/TRAINING PROGRAM

## 2022-01-10 PROCEDURE — 63600175 PHARM REV CODE 636 W HCPCS: Mod: HCNC | Performed by: STUDENT IN AN ORGANIZED HEALTH CARE EDUCATION/TRAINING PROGRAM

## 2022-01-10 PROCEDURE — 99232 PR SUBSEQUENT HOSPITAL CARE,LEVL II: ICD-10-PCS | Mod: HCNC,,, | Performed by: NURSE PRACTITIONER

## 2022-01-10 PROCEDURE — 99232 SBSQ HOSP IP/OBS MODERATE 35: CPT | Mod: HCNC,,, | Performed by: NURSE PRACTITIONER

## 2022-01-10 PROCEDURE — 20000000 HC ICU ROOM: Mod: HCNC

## 2022-01-10 PROCEDURE — 92507 TX SP LANG VOICE COMM INDIV: CPT | Mod: HCNC

## 2022-01-10 PROCEDURE — 84100 ASSAY OF PHOSPHORUS: CPT | Mod: HCNC | Performed by: STUDENT IN AN ORGANIZED HEALTH CARE EDUCATION/TRAINING PROGRAM

## 2022-01-10 PROCEDURE — 82330 ASSAY OF CALCIUM: CPT | Mod: 91,HCNC | Performed by: STUDENT IN AN ORGANIZED HEALTH CARE EDUCATION/TRAINING PROGRAM

## 2022-01-10 PROCEDURE — 99900026 HC AIRWAY MAINTENANCE (STAT): Mod: HCNC

## 2022-01-10 PROCEDURE — 82330 ASSAY OF CALCIUM: CPT | Mod: HCNC | Performed by: STUDENT IN AN ORGANIZED HEALTH CARE EDUCATION/TRAINING PROGRAM

## 2022-01-10 PROCEDURE — 63700000 PHARM REV CODE 250 ALT 637 W/O HCPCS: Mod: HCNC | Performed by: STUDENT IN AN ORGANIZED HEALTH CARE EDUCATION/TRAINING PROGRAM

## 2022-01-10 PROCEDURE — 99900035 HC TECH TIME PER 15 MIN (STAT): Mod: HCNC

## 2022-01-10 PROCEDURE — 94761 N-INVAS EAR/PLS OXIMETRY MLT: CPT | Mod: HCNC

## 2022-01-10 PROCEDURE — 97535 SELF CARE MNGMENT TRAINING: CPT | Mod: HCNC

## 2022-01-10 PROCEDURE — 80048 BASIC METABOLIC PNL TOTAL CA: CPT | Mod: HCNC | Performed by: STUDENT IN AN ORGANIZED HEALTH CARE EDUCATION/TRAINING PROGRAM

## 2022-01-10 PROCEDURE — 25000003 PHARM REV CODE 250: Mod: HCNC | Performed by: NURSE PRACTITIONER

## 2022-01-10 PROCEDURE — 99233 SBSQ HOSP IP/OBS HIGH 50: CPT | Mod: 24,HCNC,, | Performed by: STUDENT IN AN ORGANIZED HEALTH CARE EDUCATION/TRAINING PROGRAM

## 2022-01-10 PROCEDURE — 83735 ASSAY OF MAGNESIUM: CPT | Mod: HCNC | Performed by: STUDENT IN AN ORGANIZED HEALTH CARE EDUCATION/TRAINING PROGRAM

## 2022-01-10 PROCEDURE — 99233 PR SUBSEQUENT HOSPITAL CARE,LEVL III: ICD-10-PCS | Mod: 24,HCNC,, | Performed by: STUDENT IN AN ORGANIZED HEALTH CARE EDUCATION/TRAINING PROGRAM

## 2022-01-10 PROCEDURE — 85025 COMPLETE CBC W/AUTO DIFF WBC: CPT | Mod: HCNC | Performed by: STUDENT IN AN ORGANIZED HEALTH CARE EDUCATION/TRAINING PROGRAM

## 2022-01-10 RX ORDER — POLYETHYLENE GLYCOL 3350 17 G/17G
17 POWDER, FOR SOLUTION ORAL DAILY
Status: DISCONTINUED | OUTPATIENT
Start: 2022-01-10 | End: 2022-01-14 | Stop reason: HOSPADM

## 2022-01-10 RX ORDER — LANOLIN ALCOHOL/MO/W.PET/CERES
800 CREAM (GRAM) TOPICAL
Status: DISCONTINUED | OUTPATIENT
Start: 2022-01-10 | End: 2022-01-14 | Stop reason: HOSPADM

## 2022-01-10 RX ORDER — ACETAMINOPHEN 650 MG/20.3ML
650 LIQUID ORAL EVERY 6 HOURS
Status: DISCONTINUED | OUTPATIENT
Start: 2022-01-10 | End: 2022-01-14 | Stop reason: HOSPADM

## 2022-01-10 RX ORDER — FAMOTIDINE 20 MG/1
20 TABLET, FILM COATED ORAL 2 TIMES DAILY
Status: DISCONTINUED | OUTPATIENT
Start: 2022-01-10 | End: 2022-01-14 | Stop reason: HOSPADM

## 2022-01-10 RX ORDER — IBUPROFEN 200 MG
24 TABLET ORAL
Status: DISCONTINUED | OUTPATIENT
Start: 2022-01-10 | End: 2022-01-12

## 2022-01-10 RX ORDER — IBUPROFEN 200 MG
16 TABLET ORAL
Status: DISCONTINUED | OUTPATIENT
Start: 2022-01-10 | End: 2022-01-12

## 2022-01-10 RX ADMIN — DILTIAZEM HYDROCHLORIDE 30 MG: 30 TABLET, FILM COATED ORAL at 12:01

## 2022-01-10 RX ADMIN — ACETAMINOPHEN 650 MG: 160 SOLUTION ORAL at 06:01

## 2022-01-10 RX ADMIN — DOCUSATE SODIUM 100 MG: 50 LIQUID ORAL at 08:01

## 2022-01-10 RX ADMIN — INSULIN ASPART 1 UNITS: 100 INJECTION, SOLUTION INTRAVENOUS; SUBCUTANEOUS at 11:01

## 2022-01-10 RX ADMIN — APIXABAN 5 MG: 5 TABLET, FILM COATED ORAL at 08:01

## 2022-01-10 RX ADMIN — INSULIN ASPART 1 UNITS: 100 INJECTION, SOLUTION INTRAVENOUS; SUBCUTANEOUS at 12:01

## 2022-01-10 RX ADMIN — ACETAMINOPHEN 650 MG: 160 SOLUTION ORAL at 11:01

## 2022-01-10 RX ADMIN — SENNOSIDES 5 ML: 8.8 SYRUP ORAL at 08:01

## 2022-01-10 RX ADMIN — CALCIUM CARBONATE 1500 MG: 1250 SUSPENSION ORAL at 08:01

## 2022-01-10 RX ADMIN — INSULIN HUMAN 0.3 UNITS/HR: 1 INJECTION, SOLUTION INTRAVENOUS at 11:01

## 2022-01-10 RX ADMIN — AMPICILLIN SODIUM AND SULBACTAM SODIUM 3 G: 2; 1 INJECTION, POWDER, FOR SOLUTION INTRAMUSCULAR; INTRAVENOUS at 06:01

## 2022-01-10 RX ADMIN — DILTIAZEM HYDROCHLORIDE 30 MG: 30 TABLET, FILM COATED ORAL at 05:01

## 2022-01-10 RX ADMIN — DILTIAZEM HYDROCHLORIDE 30 MG: 30 TABLET, FILM COATED ORAL at 11:01

## 2022-01-10 RX ADMIN — CALCITRIOL 0.5 MCG: 1 SOLUTION ORAL at 09:01

## 2022-01-10 RX ADMIN — AMPICILLIN SODIUM AND SULBACTAM SODIUM 3 G: 2; 1 INJECTION, POWDER, FOR SOLUTION INTRAMUSCULAR; INTRAVENOUS at 10:01

## 2022-01-10 RX ADMIN — POLYETHYLENE GLYCOL 3350 17 G: 17 POWDER, FOR SOLUTION ORAL at 09:01

## 2022-01-10 RX ADMIN — CALCITRIOL 0.5 MCG: 1 SOLUTION ORAL at 08:01

## 2022-01-10 RX ADMIN — ACETAMINOPHEN 650 MG: 160 SOLUTION ORAL at 05:01

## 2022-01-10 RX ADMIN — FAMOTIDINE 20 MG: 20 TABLET ORAL at 08:01

## 2022-01-10 RX ADMIN — INSULIN ASPART 1 UNITS: 100 INJECTION, SOLUTION INTRAVENOUS; SUBCUTANEOUS at 05:01

## 2022-01-10 RX ADMIN — CALCIUM CARBONATE 1500 MG: 1250 SUSPENSION ORAL at 02:01

## 2022-01-10 RX ADMIN — FAMOTIDINE 20 MG: 10 INJECTION, SOLUTION INTRAVENOUS at 08:01

## 2022-01-10 RX ADMIN — AMPICILLIN SODIUM AND SULBACTAM SODIUM 3 G: 2; 1 INJECTION, POWDER, FOR SOLUTION INTRAMUSCULAR; INTRAVENOUS at 02:01

## 2022-01-10 RX ADMIN — LEVOTHYROXINE SODIUM 112 MCG: 112 TABLET ORAL at 05:01

## 2022-01-10 RX ADMIN — INSULIN ASPART 1 UNITS: 100 INJECTION, SOLUTION INTRAVENOUS; SUBCUTANEOUS at 08:01

## 2022-01-10 NOTE — PLAN OF CARE
Problem: SLP Goal  Goal: SLP Goal  Description: Speech Language Pathology Goals  Goals expected to be met by 1/15    1. Pt/family will actively participate in post-laryngectomy education to facilitate [place] and desensitization  2. Pt will communicate contextualized phrases via AL with approximately 60% intelligibility provided min cues to improve communication.  3. Pt/family will perform stoma care x1 per session provided min cues to facilitate [place].          Outcome: Ongoing, Progressing   Continue ongoing post-op laryngectomy education.   1/10/2022

## 2022-01-10 NOTE — PROGRESS NOTES
[facility]  Critical Care - Surgery  Progress Note    Patient Name: Cyrus Batres Jr.  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   Doing well post operatively.  Pain well controlled.  TF advance to goal.  Deescalated flap checks to q2hr.       Interval History/Significant Events:   - NAEON  - pain is well controlled  - flap checks q2h  - restarted home eliquis   - walked around with PT yesterday    Follow-up For: Procedure(s) (LRB):  LARYNGECTOMY (N/A)  DISSECTION, NECK (Bilateral)  CREATION, FREE FLAP (Right)  THYROIDECTOMY  LARYNGOSCOPY (N/A)  REIMPLANTATION, PARATHYROID TISSUE (N/A)    Post-Operative Day: 4 Days Post-Op    Objective:     Vital Signs (Most Recent):  Temp: 97.9 °F (36.6 °C) (01/10/22 0700)  Pulse: 60 (01/10/22 0700)  Resp: 16 (01/10/22 0700)  BP: 118/67 (01/10/22 0700)  SpO2: 98 % (01/10/22 0700) Vital Signs (24h Range):  Temp:  [97.6 °F (36.4 °C)-98.5 °F (36.9 °C)] 97.9 °F (36.6 °C)  Pulse:  [56-92] 60  Resp:  [9-36] 16  SpO2:  [95 %-100 %] 98 %  BP: (111-144)/(56-79) 118/67     Weight: 66 kg (145 lb 8.1 oz)  Body mass index is 23.48 kg/m².      Intake/Output Summary (Last 24 hours) at 1/10/2022 0717  Last data filed at 1/10/2022 0700  Gross per 24 hour   Intake 989.88 ml   Output 1191 ml   Net -201.12 ml       Physical Exam  Constitutional:       General: He is not in acute distress.  HENT:      Head: Normocephalic.      Nose: Nose normal.      Mouth/Throat:      Mouth: Mucous membranes are dry.   Eyes:      Extraocular Movements: Extraocular movements intact.      Pupils: Pupils are equal, round, and reactive to light.    Neck:      Comments: Neck drains with light bloody output  Free flap with good pulsation on doppler.   Cardiovascular:      Rate and Rhythm: Normal rate and regular rhythm.      Pulses: Normal pulses.      Heart sounds: Normal heart sounds.   Pulmonary:      Effort: Pulmonary effort is normal. No respiratory distress.      Breath sounds: Normal breath sounds.      Comments: Paul tube with trach collar   Abdominal:      General: Abdomen is flat. Bowel sounds are normal. There is no distension.      Palpations: Abdomen is soft.   Musculoskeletal:      Comments: RLE excision for flap with 1x drain in place   Skin:     General: Skin is warm and dry.   Neurological:      General: No focal deficit present.      Mental Status: He is alert.   Psychiatric:         Mood and Affect: Mood normal.         Vents:  Oxygen Concentration (%): 28 (01/09/22 1306)    Lines/Drains/Airways     Drain                 Gastrostomy/Enterostomy 12/27/21 1152 Percutaneous endoscopic gastrostomy (PEG) LUQ 13 days         Closed/Suction Drain 01/06/22 Right Thigh Bulb 15 Fr. 4 days         Closed/Suction Drain 01/06/22 Right;Anterior Thigh Bulb 15 Fr. 4 days         Closed/Suction Drain 01/06/22 1521 Right Neck Bulb 15 Fr. 3 days         Closed/Suction Drain 01/06/22 1524 Left Neck Bulb 15 Fr. 3 days          Airway                 Laryngectomy (Do Not Remove) 01/06/22 1655 Laryngectomy tube 3 days          Peripheral Intravenous Line                 Midline Catheter Insertion/Assessment  - Single Lumen 01/02/22 1410 Right basilic vein (medial side of arm) other (see comments) 7 days         Peripheral IV - Single Lumen 01/10/22 0000 20 G Anterior;Left Forearm <1 day                Significant Labs:    CBC/Anemia Profile:  Recent Labs   Lab 01/09/22  0327 01/10/22  0259   WBC 11.09 8.60   HGB 9.3* 9.2*   HCT 28.8* 27.9*    433   MCV 87 86   RDW 13.1 12.9        Chemistries:  Recent Labs   Lab 01/09/22  0327 01/10/22  0259   * 137    K 3.8 4.1    102   CO2 24 26   BUN 24* 22   CREATININE 0.7 0.7   CALCIUM 7.7* 8.1*   MG 1.8 1.9   PHOS 3.2 4.0       All pertinent labs within the past 24 hours have been reviewed.    Significant Imaging:  I have reviewed all pertinent imaging results/findings within the past 24 hours.    Assessment/Plan:     * Larynx neoplasm malignant  70 yo M with a PMH of AFib, DM, HTN, and SCCa of the glottis/subglottis s/p IMRT (-10/30/21) who is admitted s/p total neck dissection, total thyroidectomy, laryngectomy and ALT free flap placement.  Patient is admitted HDS and on trach collar.       Neuro/Psych:   -- RASS , CAM-ICU  -- PRN tylenol, oxycodone 5mg solution, and breakthrough IV dilaudid      Cardiovascular:   #Atrial fibrillation   -- Continue home Diltiazem 30mg q6h   -- Restarted home Eliquis      -- Hemodynamics:  -- MAP goal >65  -- Currently HDS on no pressor support     Pulmonary:   #S/p Laryngectomy and total neck dissection on 1/6/22  -- S/p trach on 12/27/21  -- now on RA     Renal   -- Cr (Baseline): 0.7  -- BUN/Cr 37  BUN   Date Value Ref Range Status   01/10/2022 22 8 - 23 mg/dL Final   01/09/2022 24 (H) 8 - 23 mg/dL Final     Creatinine   Date Value Ref Range Status   01/10/2022 0.7 0.5 - 1.4 mg/dL Final   01/09/2022 0.7 0.5 - 1.4 mg/dL Final     -- Adequate urine output     FEN / GI:   -- advance TFs as tolerated. 10mL q12 hours for a max of 50  -- Ok to use G tube for medication use  -- No BM since [place] per the patient.    -- continue bowel regimen      -- Electrolytes:   Recent Labs   Lab 01/10/22  0259      K 4.1      CO2 26   BUN 22   CREATININE 0.7   MG 1.9      -- Nutrition:   -- replace electrolytes as needed to keep K>4, Mg>2  -- bowel reg - daily miralax, docusate  -- famotidine for GI prophylaxis     ID:   -- Tm: afebrile  -- WBC , trend daily     Heme/Onc:   -- H/H stable  -- past products received: none  Lab Results   Component Value Date    WBC 8.60 01/10/2022     HGB 9.2 (L) 01/10/2022    HCT 27.9 (L) 01/10/2022    MCV 86 01/10/2022     01/10/2022        Endo:   #S/p thyroidectomy   -- Trend ionized Ca2+ q8h  -- continue 1500mg of Ca2+ q8h  -- continue calcitrol   -- continue levothyroxine    #DM2  -- endocrine following, appreciate recommendations  -- insulin ggt     PPx:   Feeding: Regular diet / NPO strict. TF adv as tolerated  Analgesia/Sedation: none  Thromboembolic prevention:LVX / SCD  HOB 30  Stress Ulcer ppx: famotidine  Glucose control: Critical care goal 140-180 g/dl  Turn q2 hrs     Dispo/Code Status/Palliative:   -- step down orders in         Critical care was time spent personally by me on the following activities: development of treatment plan with patient or surrogate and bedside caregivers, discussions with consultants, evaluation of patient's response to treatment, examination of patient, ordering and performing treatments and interventions, ordering and review of laboratory studies, ordering and review of radiographic studies, pulse oximetry, re-evaluation of patient's condition.  This critical care time did not overlap with that of any other provider or involve time for any procedures.     Anup Jose MD  Critical Care - Surgery  [facility]

## 2022-01-10 NOTE — SUBJECTIVE & OBJECTIVE
"Interval HPI:   Overnight events:  BG stable and within goal. BG has however, trended upwards with TF increase. Endo will continue to follow, and manage glycemic control while inpatient.   4 Days Post-Op  Diet NPO    Eating:   NPO  Nausea: No  Hypoglycemia and intervention: No  Fever: No  TPN and/or TF: Yes  If yes, type of TF/TPN and rate: TF @ 30 cc/hr.     /72 (BP Location: Left arm, Patient Position: Lying)   Pulse 65   Temp 97.7 °F (36.5 °C) (Oral)   Resp 16   Ht 5' 6" (1.676 m)   Wt 66 kg (145 lb 8.1 oz)   SpO2 100%   BMI 23.48 kg/m²     Labs Reviewed and Include    Recent Labs   Lab 01/10/22  0259   *   CALCIUM 8.1*      K 4.1   CO2 26      BUN 22   CREATININE 0.7     Lab Results   Component Value Date    WBC 8.60 01/10/2022    HGB 9.2 (L) 01/10/2022    HCT 27.9 (L) 01/10/2022    MCV 86 01/10/2022     01/10/2022     No results for input(s): TSH, FREET4 in the last 168 hours.  Lab Results   Component Value Date    HGBA1C 5.9 (H) 01/05/2022       Nutritional status:   Body mass index is 23.48 kg/m².  Lab Results   Component Value Date    ALBUMIN 1.9 (L) 01/07/2022    ALBUMIN 2.6 (L) 01/06/2022    ALBUMIN 2.8 (L) 01/05/2022     No results found for: PREALBUMIN    Estimated Creatinine Clearance: 88.6 mL/min (based on SCr of 0.7 mg/dL).    Accu-Checks  Recent Labs     01/08/22  2352 01/09/22  0330 01/09/22  0830 01/09/22  1139 01/09/22  1714 01/09/22  2155 01/10/22  0034 01/10/22  0258 01/10/22  0841 01/10/22  1124   POCTGLUCOSE 161* 142* 150* 166* 164* 163* 163* 164* 144* 189*       Current Medications and/or Treatments Impacting Glycemic Control  Immunotherapy:    Immunosuppressants     None        Steroids:   Hormones (From admission, onward)            None        Pressors:    Autonomic Drugs (From admission, onward)            None        Hyperglycemia/Diabetes Medications:   Antihyperglycemics (From admission, onward)            Start     Stop Route Frequency Ordered    " 01/08/22 1315  insulin regular in 0.9 % NaCl 100 unit/100 mL (1 unit/mL) infusion        Question:  Enter initial dose (Units/hr):  Answer:  0.2    -- IV Continuous 01/08/22 1305    01/07/22 1149  insulin aspart U-100 pen 0-5 Units         -- SubQ As needed (PRN) 01/07/22 1042

## 2022-01-10 NOTE — SUBJECTIVE & OBJECTIVE
Interval History: No events. Pain controlled. Awaiting stepdown bed.     Medications:  Continuous Infusions:   insulin regular 1 units/mL infusion orderable (TRANSFER) 0.2 Units/hr (01/10/22 0700)     Scheduled Meds:   acetaminophen  650 mg Per G Tube Q6H    ampicillin-sulbactim (UNASYN) IVPB  3 g Intravenous Q8H    apixaban  5 mg Per G Tube BID    calcitrioL  0.5 mcg Per G Tube BID    calcium carbonate  1,500 mg Per G Tube TID    diltiaZEM  30 mg Per G Tube Q6H    docusate  100 mg Per G Tube BID    famotidine (PF)  20 mg Intravenous BID    levothyroxine  112 mcg Per G Tube Before breakfast    sennosides 8.8 mg/5 ml  5 mL Per G Tube Daily     PRN Meds:dextrose 50%, dextrose 50%, glucagon (human recombinant), glucose, glucose, insulin aspart U-100, magnesium oxide, magnesium oxide, oxyCODONE, potassium bicarbonate, potassium bicarbonate, potassium bicarbonate, potassium, sodium phosphates, potassium, sodium phosphates, potassium, sodium phosphates     Review of patient's allergies indicates:   Allergen Reactions    Pollen extracts     Lovastatin Rash     Not confirmed but pt skeptical     Objective:     Vital Signs (24h Range):  Temp:  [97.6 °F (36.4 °C)-98.5 °F (36.9 °C)] 97.9 °F (36.6 °C)  Pulse:  [56-92] 60  Resp:  [9-36] 16  SpO2:  [95 %-100 %] 98 %  BP: (111-144)/(56-79) 118/67     Date 01/10/22 0700 - 01/11/22 0659   Shift 2666-6633 5355-9907 9919-8351 24 Hour Total   INTAKE   I.V.(mL/kg) 0.2(0)   0.2(0)   NG/GT 30   30   IV Piggyback 60.9   60.9   Shift Total(mL/kg) 91.1(1.4)   91.1(1.4)   OUTPUT   Shift Total(mL/kg)       Weight (kg) 66 66 66 66     Lines/Drains/Airways     Drain                 Gastrostomy/Enterostomy 12/27/21 1152 Percutaneous endoscopic gastrostomy (PEG) LUQ 13 days         Closed/Suction Drain 01/06/22 Right Thigh Bulb 15 Fr. 4 days         Closed/Suction Drain 01/06/22 Right;Anterior Thigh Bulb 15 Fr. 4 days         Closed/Suction Drain 01/06/22 1521 Right Neck Bulb 15 Fr. 3  days         Closed/Suction Drain 01/06/22 1524 Left Neck Bulb 15 Fr. 3 days          Airway                 Laryngectomy (Do Not Remove) 01/06/22 1655 Laryngectomy tube 3 days          Peripheral Intravenous Line                 Midline Catheter Insertion/Assessment  - Single Lumen 01/02/22 1410 Right basilic vein (medial side of arm) other (see comments) 7 days         Peripheral IV - Single Lumen 01/10/22 0000 20 G Anterior;Left Forearm <1 day                Physical Exam  Awake, alert, nad  EOMI  Neck - soft, incision c/d/i bilaterally  Flap - good color/turgor, strong triphasic doppler, cap refill appropriate  GLENN neck and leg with ss output  AAOx3, communicates with gestures    Significant Labs:  BMP:   Recent Labs   Lab 01/10/22  0259   *      CO2 26   BUN 22   CREATININE 0.7   CALCIUM 8.1*   MG 1.9     CBC:   Recent Labs   Lab 01/10/22  0259   WBC 8.60   RBC 3.23*   HGB 9.2*   HCT 27.9*      MCV 86   MCH 28.5   MCHC 33.0       Significant Diagnostics:  I have reviewed and interpreted all pertinent imaging results/findings within the past 24 hours.

## 2022-01-10 NOTE — PROGRESS NOTES
[facility]  Otorhinolaryngology-Head & Neck Surgery  Progress Note    Subjective:     Post-Op Info:  Procedure(s) (LRB):  LARYNGECTOMY (N/A)  DISSECTION, NECK (Bilateral)  CREATION, FREE FLAP (Right)  THYROIDECTOMY  LARYNGOSCOPY (N/A)  REIMPLANTATION, PARATHYROID TISSUE (N/A)   3 Days Post-Op  Hospital Day: 16     Interval History: No issues overnight.     Medications:  Continuous Infusions:   insulin regular 1 units/mL infusion orderable (TRANSFER) 0.2 Units/hr (01/09/22 1100)     Scheduled Meds:   ampicillin-sulbactim (UNASYN) IVPB  3 g Intravenous Q8H    calcitrioL  0.5 mcg Per G Tube BID    calcium carbonate  1,500 mg Per G Tube TID    diltiaZEM  30 mg Per G Tube Q6H    docusate  100 mg Per G Tube BID    enoxaparin  40 mg Subcutaneous Daily    famotidine (PF)  20 mg Intravenous BID    levothyroxine  112 mcg Per G Tube Before breakfast    sennosides 8.8 mg/5 ml  5 mL Per G Tube Daily     PRN Meds:dextrose 50%, dextrose 50%, glucagon (human recombinant), glucose, glucose, HYDROmorphone, insulin aspart U-100, magnesium oxide, magnesium oxide, oxyCODONE, oxyCODONE, potassium bicarbonate, potassium bicarbonate, potassium bicarbonate, potassium, sodium phosphates, potassium, sodium phosphates, potassium, sodium phosphates     Review of patient's allergies indicates:   Allergen Reactions    Pollen extracts     Lovastatin Rash     Not confirmed but pt skeptical     Objective:     Vital Signs (24h Range):  Temp:  [99.2 °F (37.3 °C)] 99.2 °F (37.3 °C)  Pulse:  [65-83] 68  Resp:  [14-25] 17  SpO2:  [96 %-99 %] 98 %  BP: (111-144)/(65-77) 115/66     Date 01/09/22 0700 - 01/10/22 0659   Shift 8472-3423 6953-2831 6043-5396 24 Hour Total   INTAKE   I.V.(mL/kg) 0.2(0)   0.2(0)   NG/GT 30   30   Shift Total(mL/kg) 30.2(0.5)   30.2(0.5)   OUTPUT   Drains 15   15   Shift Total(mL/kg) 15(0.2)   15(0.2)   Weight (kg) 66 66 66 66     Lines/Drains/Airways     Drain                  Gastrostomy/Enterostomy 12/27/21 1152 Percutaneous endoscopic gastrostomy (PEG) LUQ 12 days         Closed/Suction Drain 01/06/22 Right Thigh Bulb 15 Fr. 3 days         Closed/Suction Drain 01/06/22 Right;Anterior Thigh Bulb 15 Fr. 3 days         Closed/Suction Drain 01/06/22 1521 Right Neck Bulb 15 Fr. 2 days         Closed/Suction Drain 01/06/22 1524 Left Neck Bulb 15 Fr. 2 days          Airway                 Laryngectomy (Do Not Remove) 01/06/22 1655 Laryngectomy tube 2 days          Peripheral Intravenous Line                 Midline Catheter Insertion/Assessment  - Single Lumen 01/02/22 1410 Right basilic vein (medial side of arm) other (see comments) 6 days         Peripheral IV - Single Lumen 01/06/22 0853 18 G Right Hand 3 days                Physical Exam   Awake, alert, nad  EOMI  Neck - soft, incision c/d/i bilaterally  Flap - good color/turgor, strong triphasic doppler, cap refill appropriate  GLENN neck and leg with ss output  AAOx3, communicates with gestures     Significant Labs:  CBC:   Recent Labs   Lab 01/09/22  0327   WBC 11.09   RBC 3.31*   HGB 9.3*   HCT 28.8*      MCV 87   MCH 28.1   MCHC 32.3     CMP:   Recent Labs   Lab 01/07/22  0332 01/08/22  0218 01/09/22  0327   *   < > 123*   CALCIUM 6.8*   < > 7.7*   ALBUMIN 1.9*  --   --    PROT 4.3*  --   --    *   < > 134*   K 5.0   < > 3.8   CO2 20*   < > 24      < > 101   BUN 33*   < > 24*   CREATININE 0.8   < > 0.7   ALKPHOS 126  --   --    ALT 19  --   --    AST 36  --   --    BILITOT 0.6  --   --     < > = values in this interval not displayed.       Significant Diagnostics:  I have reviewed and interpreted all pertinent imaging results/findings within the past 24 hours.    Assessment/Plan:     * Larynx neoplasm malignant  - Ok for stepdwon to PCU  Continue q2h flap checks: Record Doppler Pulses (artery and vein) ,Capillary Refill , Color, Turgor, temperature.   - Neurovascular Checks q2h of bilateral upper and lower  "extremities   - Keep head elevated and in neutral position  - Sign above bed that reads "No circumferential Neck Ties"   - FOR ANY FLAP ISSUES, PLEASE PHONE ENT RESIDENT ON CALL.  - Please label drains carefully and document accordingly  - Maintain xeroform in left ear canal  - Bacitracin twice daily to incision line     Neuro:  - Alert, oriented. Pain medication PRN.      CV:  - HDS. SBP > 90. A-line can be removed  - continue with q1h flap checks     Pulm:  - stable     GI/FEN/Lytes:  - Strict NPO  - monitor calcium closely and replete prn  - start trickle TFs today  - replace lytes prn     Renal:  - UOP adequate. [place]arez out.      Heme/ID:  - cont to trend HH  - cont Unasyn     Endo:  - Q6 acuchecks, SSI.      PPX:  PT/OT  L40  PPI     Dispo:   Can likely step down to PCU today                        Mitesh Tang MD  Otorhinolaryngology-Head & Neck Surgery  [facility]  "

## 2022-01-10 NOTE — ASSESSMENT & PLAN NOTE
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  Patient is admitted HDS and on trach collar.       Neuro/Psych:   -- RASS , CAM-ICU  -- PRN tylenol, oxycodone 5mg solution, and breakthrough IV dilaudid      Cardiovascular:   #Atrial fibrillation   -- Continue home Diltiazem 30mg q6h   -- Restarted home Eliquis      -- Hemodynamics:  -- MAP goal >65  -- Currently HDS on no pressor support     Pulmonary:   #S/p Laryngectomy and total neck dissection on 1/6/22  -- S/p trach on 12/27/21  -- now on RA     Renal   -- Cr (Baseline): 0.7  -- BUN/Cr 37  BUN   Date Value Ref Range Status   01/10/2022 22 8 - 23 mg/dL Final   01/09/2022 24 (H) 8 - 23 mg/dL Final     Creatinine   Date Value Ref Range Status   01/10/2022 0.7 0.5 - 1.4 mg/dL Final   01/09/2022 0.7 0.5 - 1.4 mg/dL Final     -- Adequate urine output     FEN / GI:   -- advance TFs as tolerated. 10mL q12 hours for a max of 50  -- Ok to use G tube for medication use  -- No BM since [place] per the patient.    -- continue bowel regimen      -- Electrolytes:   Recent Labs   Lab 01/10/22  0259      K 4.1      CO2 26   BUN 22   CREATININE 0.7   MG 1.9      -- Nutrition:   -- replace electrolytes as needed to keep K>4, Mg>2  -- bowel reg - daily miralax, docusate  -- famotidine for GI prophylaxis     ID:   -- Tm: afebrile  -- WBC , trend daily     Heme/Onc:   -- H/H stable  -- past products received: none  Lab Results   Component Value Date    WBC 8.60 01/10/2022    HGB 9.2 (L) 01/10/2022    HCT 27.9 (L) 01/10/2022    MCV 86 01/10/2022     01/10/2022        Endo:   #S/p thyroidectomy   -- Trend ionized Ca2+ q8h  -- continue 1500mg of Ca2+ q8h  -- continue calcitrol   -- continue levothyroxine    #DM2  -- endocrine following, appreciate recommendations  -- insulin ggt     PPx:   Feeding: Regular  diet / NPO strict. TF adv as tolerated  Analgesia/Sedation: none  Thromboembolic prevention:LVX / SCD  HOB 30  Stress Ulcer ppx: famotidine  Glucose control: Critical care goal 140-180 g/dl  Turn q2 hrs     Dispo/Code Status/Palliative:   -- continue care in the SICU

## 2022-01-10 NOTE — SUBJECTIVE & OBJECTIVE
Interval History/Significant Events:   - NAEON  - pain is well controlled  - flap checks q2h  - restarted home eliquis   - walked around with PT yesterday    Follow-up For: Procedure(s) (LRB):  LARYNGECTOMY (N/A)  DISSECTION, NECK (Bilateral)  CREATION, FREE FLAP (Right)  THYROIDECTOMY  LARYNGOSCOPY (N/A)  REIMPLANTATION, PARATHYROID TISSUE (N/A)    Post-Operative Day: 4 Days Post-Op    Objective:     Vital Signs (Most Recent):  Temp: 97.9 °F (36.6 °C) (01/10/22 0700)  Pulse: 60 (01/10/22 0700)  Resp: 16 (01/10/22 0700)  BP: 118/67 (01/10/22 0700)  SpO2: 98 % (01/10/22 0700) Vital Signs (24h Range):  Temp:  [97.6 °F (36.4 °C)-98.5 °F (36.9 °C)] 97.9 °F (36.6 °C)  Pulse:  [56-92] 60  Resp:  [9-36] 16  SpO2:  [95 %-100 %] 98 %  BP: (111-144)/(56-79) 118/67     Weight: 66 kg (145 lb 8.1 oz)  Body mass index is 23.48 kg/m².      Intake/Output Summary (Last 24 hours) at 1/10/2022 0717  Last data filed at 1/10/2022 0700  Gross per 24 hour   Intake 989.88 ml   Output 1191 ml   Net -201.12 ml       Physical Exam  Constitutional:       General: He is not in acute distress.  HENT:      Head: Normocephalic.      Nose: Nose normal.      Mouth/Throat:      Mouth: Mucous membranes are dry.   Eyes:      Extraocular Movements: Extraocular movements intact.      Pupils: Pupils are equal, round, and reactive to light.   Neck:      Comments: Neck drains with light bloody output  Free flap with good pulsation on doppler.   Cardiovascular:      Rate and Rhythm: Normal rate and regular rhythm.      Pulses: Normal pulses.      Heart sounds: Normal heart sounds.   Pulmonary:      Effort: Pulmonary effort is normal. No respiratory distress.      Breath sounds: Normal breath sounds.      Comments: Paul tube with trach collar   Abdominal:      General: Abdomen is flat. Bowel sounds are normal. There is no distension.      Palpations: Abdomen is soft.   Musculoskeletal:      Comments: RLE excision for flap with 1x drain in place   Skin:      General: Skin is warm and dry.   Neurological:      General: No focal deficit present.      Mental Status: He is alert.   Psychiatric:         Mood and Affect: Mood normal.         Vents:  Oxygen Concentration (%): 28 (01/09/22 1306)    Lines/Drains/Airways     Drain                 Gastrostomy/Enterostomy 12/27/21 1152 Percutaneous endoscopic gastrostomy (PEG) LUQ 13 days         Closed/Suction Drain 01/06/22 Right Thigh Bulb 15 Fr. 4 days         Closed/Suction Drain 01/06/22 Right;Anterior Thigh Bulb 15 Fr. 4 days         Closed/Suction Drain 01/06/22 1521 Right Neck Bulb 15 Fr. 3 days         Closed/Suction Drain 01/06/22 1524 Left Neck Bulb 15 Fr. 3 days          Airway                 Laryngectomy (Do Not Remove) 01/06/22 1655 Laryngectomy tube 3 days          Peripheral Intravenous Line                 Midline Catheter Insertion/Assessment  - Single Lumen 01/02/22 1410 Right basilic vein (medial side of arm) other (see comments) 7 days         Peripheral IV - Single Lumen 01/10/22 0000 20 G Anterior;Left Forearm <1 day                Significant Labs:    CBC/Anemia Profile:  Recent Labs   Lab 01/09/22  0327 01/10/22  0259   WBC 11.09 8.60   HGB 9.3* 9.2*   HCT 28.8* 27.9*    433   MCV 87 86   RDW 13.1 12.9        Chemistries:  Recent Labs   Lab 01/09/22  0327 01/10/22  0259   * 137   K 3.8 4.1    102   CO2 24 26   BUN 24* 22   CREATININE 0.7 0.7   CALCIUM 7.7* 8.1*   MG 1.8 1.9   PHOS 3.2 4.0       All pertinent labs within the past 24 hours have been reviewed.    Significant Imaging:  I have reviewed all pertinent imaging results/findings within the past 24 hours.

## 2022-01-10 NOTE — PROGRESS NOTES
[facility]  Otorhinolaryngology-Head & Neck Surgery  Progress Note    Subjective:     Post-Op Info:  Procedure(s) (LRB):  LARYNGECTOMY (N/A)  DISSECTION, NECK (Bilateral)  CREATION, FREE FLAP (Right)  THYROIDECTOMY  LARYNGOSCOPY (N/A)  REIMPLANTATION, PARATHYROID TISSUE (N/A)   4 Days Post-Op  Hospital Day: 17     Interval History: No events. Pain controlled. Awaiting stepdown bed.     Medications:  Continuous Infusions:   insulin regular 1 units/mL infusion orderable (TRANSFER) 0.2 Units/hr (01/10/22 0700)     Scheduled Meds:   acetaminophen  650 mg Per G Tube Q6H    ampicillin-sulbactim (UNASYN) IVPB  3 g Intravenous Q8H    apixaban  5 mg Per G Tube BID    calcitrioL  0.5 mcg Per G Tube BID    calcium carbonate  1,500 mg Per G Tube TID    diltiaZEM  30 mg Per G Tube Q6H    docusate  100 mg Per G Tube BID    famotidine (PF)  20 mg Intravenous BID    levothyroxine  112 mcg Per G Tube Before breakfast    sennosides 8.8 mg/5 ml  5 mL Per G Tube Daily     PRN Meds:dextrose 50%, dextrose 50%, glucagon (human recombinant), glucose, glucose, insulin aspart U-100, magnesium oxide, magnesium oxide, oxyCODONE, potassium bicarbonate, potassium bicarbonate, potassium bicarbonate, potassium, sodium phosphates, potassium, sodium phosphates, potassium, sodium phosphates     Review of patient's allergies indicates:   Allergen Reactions    Pollen extracts     Lovastatin Rash     Not confirmed but pt skeptical     Objective:     Vital Signs (24h Range):  Temp:  [97.6 °F (36.4 °C)-98.5 °F (36.9 °C)] 97.9 °F (36.6 °C)  Pulse:  [56-92] 60  Resp:  [9-36] 16  SpO2:  [95 %-100 %] 98 %  BP: (111-144)/(56-79) 118/67     Date 01/10/22 0700 - 01/11/22 0659   Shift 0392-3393 4508-6656 7925-3828 24 Hour Total   INTAKE   I.V.(mL/kg) 0.2(0)   0.2(0)   NG/GT 30   30   IV Piggyback 60.9   60.9   Shift Total(mL/kg) 91.1(1.4)   91.1(1.4)   OUTPUT   Shift Total(mL/kg)       Weight (kg) 66 66 66 66  "    Lines/Drains/Airways     Drain                 Gastrostomy/Enterostomy 12/27/21 1152 Percutaneous endoscopic gastrostomy (PEG) LUQ 13 days         Closed/Suction Drain 01/06/22 Right Thigh Bulb 15 Fr. 4 days         Closed/Suction Drain 01/06/22 Right;Anterior Thigh Bulb 15 Fr. 4 days         Closed/Suction Drain 01/06/22 1521 Right Neck Bulb 15 Fr. 3 days         Closed/Suction Drain 01/06/22 1524 Left Neck Bulb 15 Fr. 3 days          Airway                 Laryngectomy (Do Not Remove) 01/06/22 1655 Laryngectomy tube 3 days          Peripheral Intravenous Line                 Midline Catheter Insertion/Assessment  - Single Lumen 01/02/22 1410 Right basilic vein (medial side of arm) other (see comments) 7 days         Peripheral IV - Single Lumen 01/10/22 0000 20 G Anterior;Left Forearm <1 day                Physical Exam  Awake, alert, nad  EOMI  Neck - soft, incision c/d/i bilaterally  Flap - good color/turgor, strong triphasic doppler, cap refill appropriate  GLENN neck and leg with ss output  AAOx3, communicates with gestures    Significant Labs:  BMP:   Recent Labs   Lab 01/10/22  0259   *      CO2 26   BUN 22   CREATININE 0.7   CALCIUM 8.1*   MG 1.9     CBC:   Recent Labs   Lab 01/10/22  0259   WBC 8.60   RBC 3.23*   HGB 9.2*   HCT 27.9*      MCV 86   MCH 28.5   MCHC 33.0       Significant Diagnostics:  I have reviewed and interpreted all pertinent imaging results/findings within the past 24 hours.    Assessment/Plan:     * Larynx neoplasm malignant  - Ok for stepdwon to PCU  Continue q2h flap checks: Record Doppler Pulses (artery and vein) ,Capillary Refill , Color, Turgor, temperature.   - Neurovascular Checks q2h of bilateral upper and lower extremities   - Keep head elevated and in neutral position  - Sign above bed that reads "No circumferential Neck Ties"   - FOR ANY FLAP ISSUES, PLEASE PHONE ENT RESIDENT ON CALL.  - Please label drains carefully and document accordingly  - Maintain " xeroform in left ear canal  - Bacitracin twice daily to incision line     Neuro:  - Alert, oriented. Pain medication PRN.      CV:  - HDS. SBP > 90. A-line can be removed  - continue with q1h flap checks     Pulm:  - stable     GI/FEN/Lytes:  - Strict NPO  - monitor calcium closely and replete prn  - start trickle TFs today  - replace lytes prn     Renal:  - UOP adequate. Juarez out.      Heme/ID:  - cont to trend HH  - cont Unasyn     Endo:  - Q6 acuchecks, SSI.      PPX:  PT/OT  L40  PPI     Dispo:   Can likely step down to PCU today                        Yo Herrera MD  Otorhinolaryngology-Head & Neck Surgery  [facility]

## 2022-01-10 NOTE — PLAN OF CARE
No acute events overnight.     VSS. Afebrile. NSR 50-60s. Maintaining MAP > 65. Sats % on room air.     Neuro: oriented x 4, PERRLA, follows commands, 1 person assist.     Gtts:   Insulin @ 0.2 units/hr     Voids per urinal approx. 700 cc over shift.     GLENN x 4 to bulb suction minimal output serosanguineous.     LUQ PEG with TF @ 30cc/ hr.     Skin: bilateral neck and right thigh staples intact. No new breakdown noted. CHG bath complete.     Flap checks q2h. See flowsheets for details.

## 2022-01-10 NOTE — ASSESSMENT & PLAN NOTE
BG goal 140-180    - Increase transition IV insulin infusion with stepdown parameters. Initial rate starting at 0.3 units/hr. BG trending upwards while on TF.  - Low Dose SQ Insulin Correction Scale.  - BG monitoring every 4 hours while NPO.     ** Please call Endocrine for any BG related issues **  ** Please notify Endocrine for any change and/or advance in diet**    Lab Results   Component Value Date    HGBA1C 5.9 (H) 01/05/2022     Discharge Planning:   TBD. Please notify endocrinology prior to discharge.

## 2022-01-10 NOTE — PROGRESS NOTES
[facility]  Endocrinology  Progress Note    Admit Date: 12/25/2021     Reason for Consult: Management of T2DM, Hyperglycemia     Surgical Procedure and Date:   12/27/21  CREATION, TRACHEOSTOMY (N/A)  LARYNGOSCOPY, DIRECT, WITH BRONCHOSCOPY (N/A)  INSERTION, PEG TUBE (N/A)     Diabetes diagnosis year: 2010    Home Diabetes Medications:  Ozempic stopped on 10/18/21 by Dena Silveira as a1c below goal     How often checking glucose at home?  Once daily    BG readings on regimen: 150s  Hypoglycemia on the regimen?  No  Missed doses on regimen?  n/a    Diabetes Complications include:     None     Complicating diabetes co morbidities:   pAfib, HTN, active cancer       HPI:   Patient is a 71 y.o. 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 He presented as a transfer from [place] for ENT evaluation. He initially presented to the ER for worsening hemoptysis. Laryngeal videostroboscopy 11/8/21 notable for webbing across membranous vocal folds, granular changes infraglottically, firbinous debris across anterior commissure, post surgical stiffness of bilateral true vocal folds, phonatory supraglottic hyperfunction, occasional plica ventricularis, thick salivary secretions, pooled in pyriforms, diffuse mild laryngopharyngeal edema.He is now s/p the above procedure. He was last seen by MISSY Gutierrez on 10/18/21 for DM management. Endocrinology consulted for management of T2DM.            Lab Results   Component Value Date    HGBA1C 5.5 10/05/2021           Interval HPI:   Overnight events:  BG stable and within goal. BG has however, trended upwards with TF increase. Endo will continue to follow, and manage glycemic control while inpatient.   4 Days Post-Op  Diet NPO    Eating:   NPO  Nausea: No  Hypoglycemia and intervention: No  Fever: No  TPN and/or TF: Yes  If yes, type of TF/TPN and rate: TF @ 30 cc/hr.     BP  "139/72 (BP Location: Left arm, Patient Position: Lying)   Pulse 65   Temp 97.7 °F (36.5 °C) (Oral)   Resp 16   Ht 5' 6" (1.676 m)   Wt 66 kg (145 lb 8.1 oz)   SpO2 100%   BMI 23.48 kg/m²     Labs Reviewed and Include    Recent Labs   Lab 01/10/22  0259   *   CALCIUM 8.1*      K 4.1   CO2 26      BUN 22   CREATININE 0.7     Lab Results   Component Value Date    WBC 8.60 01/10/2022    HGB 9.2 (L) 01/10/2022    HCT 27.9 (L) 01/10/2022    MCV 86 01/10/2022     01/10/2022     No results for input(s): TSH, FREET4 in the last 168 hours.  Lab Results   Component Value Date    HGBA1C 5.9 (H) 01/05/2022       Nutritional status:   Body mass index is 23.48 kg/m².  Lab Results   Component Value Date    ALBUMIN 1.9 (L) 01/07/2022    ALBUMIN 2.6 (L) 01/06/2022    ALBUMIN 2.8 (L) 01/05/2022     No results found for: PREALBUMIN    Estimated Creatinine Clearance: 88.6 mL/min (based on SCr of 0.7 mg/dL).    Accu-Checks  Recent Labs     01/08/22  2352 01/09/22  0330 01/09/22  0830 01/09/22  1139 01/09/22  1714 01/09/22  2155 01/10/22  0034 01/10/22  0258 01/10/22  0841 01/10/22  1124   POCTGLUCOSE 161* 142* 150* 166* 164* 163* 163* 164* 144* 189*       Current Medications and/or Treatments Impacting Glycemic Control  Immunotherapy:    Immunosuppressants     None        Steroids:   Hormones (From admission, onward)            None        Pressors:    Autonomic Drugs (From admission, onward)            None        Hyperglycemia/Diabetes Medications:   Antihyperglycemics (From admission, onward)            Start     Stop Route Frequency Ordered    01/08/22 1315  insulin regular in 0.9 % NaCl 100 unit/100 mL (1 unit/mL) infusion        Question:  Enter initial dose (Units/hr):  Answer:  0.2    -- IV Continuous 01/08/22 1305    01/07/22 1149  insulin aspart U-100 pen 0-5 Units         -- SubQ As needed (PRN) 01/07/22 1049          ASSESSMENT and PLAN    * Larynx neoplasm malignant  Managed per primary " team  Optimize BG control        Type 2 diabetes mellitus with hyperglycemia, without long-term current use of insulin  BG goal 140-180    - Increase transition IV insulin infusion with stepdown parameters. Initial rate starting at 0.3 units/hr. BG trending upwards while on TF.  - Low Dose SQ Insulin Correction Scale.  - BG monitoring every 4 hours while NPO.     ** Please call Endocrine for any BG related issues **  ** Please notify Endocrine for any change and/or advance in diet**    Lab Results   Component Value Date    HGBA1C 5.9 (H) 01/05/2022     Discharge Planning:   TBD. Please notify endocrinology prior to discharge.         Hyperlipidemia  Managed per primary team  Condition may cause insulin resistance         Paroxysmal atrial fibrillation  Managed per primary team  Condition may cause insulin resistance         Adverse effect of adrenal cortical steroids, sequela  On steroid therapy per transplant team; may elevate BG readings        On enteral nutrition  Enteral nutrition may increase blood glucose.  Optimize BG control              Vargas Cortez NP  Endocrinology  [facility]

## 2022-01-10 NOTE — PT/OT/SLP PROGRESS
[facility]    Total Laryngectomy Treatment Note    Patient Name:  Cyrus Batres Jr.   MRN:  [id number]  Admitting Diagnosis: Larynx neoplasm malignant    PATIENT IS A NECK-BREATHER - DO NOT ORALLY INTUBATE    Recommendations:                 General Recommendations:  post-op education  Diet recommendations:  Patient NPO until cleared by ENT  General Precautions: Standard, fall,respiratory    Communication:      Communication strategies: Eyeglasses, Provide increased time to answer, Go to room if call light pushed, Encourage use of communication device (AL) and Whiteboard/Paper-pencil provided   Patient communicating all wants and needs? yes   Supplies labeled with patient information?  yes   RN notified if supplies brought to patient's room?  yes    History:     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       Past Surgical History:   Procedure Laterality Date    DIRECT LARYNGOBRONCHOSCOPY N/A 12/27/2021    Procedure: LARYNGOSCOPY, DIRECT, WITH BRONCHOSCOPY;  Surgeon: Flex Espinosa MD;  Location: [facility] OR [address] [place] [address];  Service: ENT;  Laterality: N/A;    DISSECTION OF NECK Bilateral 1/6/2022    Procedure: DISSECTION, NECK;  Surgeon: Jesse James MD;  Location: [address];  Service: ENT;  Laterality: Bilateral;    FLAP PROCEDURE Right 1/6/2022    Procedure: CREATION, FREE FLAP;  Surgeon: Alise Hart MD;  Location: [facility] OR [address] [place] [address];  Service: ENT;  Laterality: Right;  Ischemic start 1351  Ischemic stop 1502    LARYNGECTOMY N/A 1/6/2022    Procedure: LARYNGECTOMY;  Surgeon: Jesse James MD;  Location: [facility] OR [address] [place] [address];  Service: ENT;  Laterality: N/A;    LARYNGOSCOPY N/A 8/4/2020    Procedure: Suspension microlaryngoscopy with biopsy, possible KTP laser treatment/excision;  Surgeon: Stew Noel MD;   Location: [facility] OR [facility];  Service: ENT;  Laterality: N/A;  Microscope, telescopes, tower, microinstruments, KTP laser, rep conf# [id number] IC 7/28.    LARYNGOSCOPY N/A 3/16/2021    Procedure: Suspension microlaryngoscopy with excision of lesion, possible CO2 laser;  Surgeon: Stew Noel MD;  [facility];  Service: ENT;  Laterality: N/A;  Microscope, telescopes, tower, microinstruments, CO2 laser, rep conf# [id number] IC 3/4.    LARYNGOSCOPY N/A 4/1/2021    Procedure: Suspension microlaryngoscopy with KTP laser excision of lesion;  Surgeon: Stew Noel MD;  [facility];  Service: ENT;  Laterality: N/A;  Microscope, telescopes, tower, microinstruments, 70 degree scope, vocal fold , KTP laser, rep conf# [id number] BC    LARYNGOSCOPY N/A 12/9/2021    Procedure: Suspension microlaryngoscopy with biopsy;  Surgeon: Stew Noel MD;  [facility];  Service: ENT;  Laterality: N/A;  Microscope, telescopes, tower, microinstruments    LARYNGOSCOPY N/A 1/6/2022    Procedure: LARYNGOSCOPY;  Surgeon: Jesse James MD;  [facility];  Service: ENT;  Laterality: N/A;    MICROLARYNGOSCOPY N/A 3/17/2020    Procedure: MICROLARYNGOSCOPY;  Surgeon: Jung Xiao MD;  Location: [facility] OR;  Service: ENT;  Laterality: N/A;  Laser Microlaryngoscopy  NEED TO SCHEDULE LASER from [employer] [id number]    REIMPLANTATION OF PARATHYROID TISSUE N/A 1/6/2022    Procedure: REIMPLANTATION, PARATHYROID TISSUE;  Surgeon: Jesse James MD;  [facility];  Service: ENT;  Laterality: N/A;    THYROIDECTOMY  1/6/2022    Procedure: THYROIDECTOMY;  Surgeon: Jesse James MD;  [facility];  Service: ENT;;    TRACHEOSTOMY N/A 12/27/2021    Procedure: CREATION, TRACHEOSTOMY;  Surgeon: Flex Espinosa MD;  Location: [facility] OR [address] [place] [address];  Service: ENT;  Laterality: N/A;       Date of Surgery: 1/6/22    The patient received a  "total laryngectomy with bilateral neck dissection.  PEG ?  yes  TEP placed at time of surgery?  no    Subjective:     Awake/alert    Pain/Comfort:  · Pain Rating 1: 0/10  · Pain Rating Post-Intervention 1: 0/10  · Location 2: abdomen  · Pain Addressed 2: Reposition,Distraction    Respiratory Status: Room air    Objective:     Communication:  · Gestures/Facial Expressions  · Mouthing Words  · Artificial Larynx Trials:  Yes  Placement:  Able to achieve independently  · Timing:  Using appropriately with short phrases  · Intelligibility:  Phrase 85%    Additional Treatment:  Pt repositioned upright in bed for ongoing education. Mp tube with HME in place throughout session. Pt recalled information about post-op kit with 80% accy ind'ly. He utilized AL throughout session. Mp tube removed and stoma care completed by SLP and mp tube with HME placed back into stoma. Continue POC at this time.     Education:     General Post-Operative Education Provided:  · SLP discussed:   Anatomical changes that will occur in respiration, communication, coughing, sneezing, blowing nose, and swallowing   · Timelines of swallowing assessments and progression of textures were detailed  · Patient will be a total neck breather and that in the event of emergency, oxygen must be provided to stoma   · Patient advised to contact local fire department to update emergency personnel that patient will be a "total neck breather"   · Educated on stoma care and how to perform   · Use and benefits of heat and moisture exchanges (HMEs) to provide humidity to stoma   · Basic surgical course and recovery  · Expectations for follow up and discharge   · Lifestyle changes, including importance of avoiding water to stoma provided.  · SLP demonstrated use of an artificial larynx (AL) on neck and external cheek. Discussed AL speech with an oral adaptor.  · Patient engaged in education and demonstrating adequate understanding of all topics " reviewed.    Alternate Airway Precautions in Place:  · All laryngectomy precautions present: Orange signs above HOB and on door, communication impaired wristband, Pediatric ambu bag mask (size 3)  ·  notified patient has laryngectomy and to call RN immediately when patient presses call bell.  · RN notified to enter room immediately when called to attend patient.    Impressions:  · Patient would benefit from ongoing education/support of  Post-operative anatomy, physical and functional limitations  · Permanency of thacheostoma  · Contents of post-laryngectomy kit  · The ability to perform Stoma care.    Goals:   Multidisciplinary Problems     SLP Goals        Problem: SLP Goal    Goal Priority Disciplines Outcome   SLP Goal     SLP Ongoing, Progressing   Description: Speech Language Pathology Goals  Goals expected to be met by 1/15    1. Pt/family will actively participate in post-laryngectomy education to facilitate [place] and desensitization  2. Pt will communicate contextualized phrases via AL with approximately 60% intelligibility provided min cues to improve communication.  3. Pt/family will perform stoma care x1 per session provided min cues to facilitate [place].                           Plan:     · Patient to be seen:  5 x/week   · Plan of Care expires:  02/06/22  · Plan of Care reviewed with:  patient   · SLP Follow-Up:  Yes       Discharge recommendations:  outpatient speech therapy       Time Tracking:     SLP Treatment Date:   01/10/22  Speech Start Time:  0810  Speech Stop Time:  0824     Speech Total Time (min):  14 min    Billable Minutes: Speech Therapy Individual 6 and Self Care/Home Management Training 8    01/10/2022

## 2022-01-10 NOTE — PLAN OF CARE
[facility] Int[facility]  Discharge Reassessment    Primary Care Provider: Diana Calhoun MD    Expected Discharge Date: 1/14/2022    Reassessment (most recent)     Discharge Reassessment - 01/10/22 1238        Discharge Reassessment    Assessment Type Discharge Planning Reassessment     Communicated CHIARA with patient/caregiver Date not available/Unable to determine     Discharge Plan A Home     Discharge Plan B Home with family     DME Needed Upon Discharge  none     Discharge Barriers Identified None     Why the patient remains in the hospital Requires continued medical care        Post-Acute Status    Post-Acute Authorization Other     Other Status No Post-Acute Service Needs               Per MD's Note, Admitted to the SICU following TND, total thyroidectomy, laryngectomy and free flap for SCC of glottis/ subglottis on 1/6/22.   Doing well post operatively.  Pain well controlled.  TF advance to goal.  Deescalated flap checks to q2hr.    The patient is not medically stable to discharge at this time. The SW will continue to follow-up with the patient to assist with discharge needs.      Ronan Aparicio LMSW  Case Management [facility]  Ext: [phone]

## 2022-01-11 LAB
ANION GAP SERPL CALC-SCNC: 8 MMOL/L (ref 8–16)
BASOPHILS # BLD AUTO: 0.03 K/UL (ref 0–0.2)
BASOPHILS NFR BLD: 0.4 % (ref 0–1.9)
BUN SERPL-MCNC: 21 MG/DL (ref 8–23)
CA-I BLDV-SCNC: 1.16 MMOL/L (ref 1.06–1.42)
CA-I BLDV-SCNC: 1.22 MMOL/L (ref 1.06–1.42)
CA-I BLDV-SCNC: 1.23 MMOL/L (ref 1.06–1.42)
CALCIUM SERPL-MCNC: 8.4 MG/DL (ref 8.7–10.5)
CHLORIDE SERPL-SCNC: 103 MMOL/L (ref 95–110)
CO2 SERPL-SCNC: 26 MMOL/L (ref 23–29)
CREAT SERPL-MCNC: 0.7 MG/DL (ref 0.5–1.4)
DIFFERENTIAL METHOD: ABNORMAL
EOSINOPHIL # BLD AUTO: 0.2 K/UL (ref 0–0.5)
EOSINOPHIL NFR BLD: 2.3 % (ref 0–8)
ERYTHROCYTE [DISTWIDTH] IN BLOOD BY AUTOMATED COUNT: 12.9 % (ref 11.5–14.5)
EST. GFR  (AFRICAN AMERICAN): >60 ML/MIN/1.73 M^2
EST. GFR  (NON AFRICAN AMERICAN): >60 ML/MIN/1.73 M^2
GLUCOSE SERPL-MCNC: 165 MG/DL (ref 70–110)
HCT VFR BLD AUTO: 27.4 % (ref 40–54)
HGB BLD-MCNC: 9.2 G/DL (ref 14–18)
IMM GRANULOCYTES # BLD AUTO: 0.07 K/UL (ref 0–0.04)
IMM GRANULOCYTES NFR BLD AUTO: 0.8 % (ref 0–0.5)
LYMPHOCYTES # BLD AUTO: 0.6 K/UL (ref 1–4.8)
LYMPHOCYTES NFR BLD: 7.6 % (ref 18–48)
MAGNESIUM SERPL-MCNC: 1.7 MG/DL (ref 1.6–2.6)
MCH RBC QN AUTO: 28.2 PG (ref 27–31)
MCHC RBC AUTO-ENTMCNC: 33.6 G/DL (ref 32–36)
MCV RBC AUTO: 84 FL (ref 82–98)
MONOCYTES # BLD AUTO: 0.4 K/UL (ref 0.3–1)
MONOCYTES NFR BLD: 5.1 % (ref 4–15)
NEUTROPHILS # BLD AUTO: 6.9 K/UL (ref 1.8–7.7)
NEUTROPHILS NFR BLD: 83.8 % (ref 38–73)
NRBC BLD-RTO: 0 /100 WBC
PHOSPHATE SERPL-MCNC: 3.9 MG/DL (ref 2.7–4.5)
PLATELET # BLD AUTO: 441 K/UL (ref 150–450)
PMV BLD AUTO: 9.4 FL (ref 9.2–12.9)
POCT GLUCOSE: 125 MG/DL (ref 70–110)
POCT GLUCOSE: 146 MG/DL (ref 70–110)
POCT GLUCOSE: 151 MG/DL (ref 70–110)
POCT GLUCOSE: 153 MG/DL (ref 70–110)
POCT GLUCOSE: 163 MG/DL (ref 70–110)
POCT GLUCOSE: 199 MG/DL (ref 70–110)
POTASSIUM SERPL-SCNC: 3.9 MMOL/L (ref 3.5–5.1)
RBC # BLD AUTO: 3.26 M/UL (ref 4.6–6.2)
SODIUM SERPL-SCNC: 137 MMOL/L (ref 136–145)
WBC # BLD AUTO: 8.26 K/UL (ref 3.9–12.7)

## 2022-01-11 PROCEDURE — 25000003 PHARM REV CODE 250: Mod: HCNC | Performed by: STUDENT IN AN ORGANIZED HEALTH CARE EDUCATION/TRAINING PROGRAM

## 2022-01-11 PROCEDURE — 99900035 HC TECH TIME PER 15 MIN (STAT): Mod: HCNC

## 2022-01-11 PROCEDURE — 83735 ASSAY OF MAGNESIUM: CPT | Mod: HCNC | Performed by: STUDENT IN AN ORGANIZED HEALTH CARE EDUCATION/TRAINING PROGRAM

## 2022-01-11 PROCEDURE — 84100 ASSAY OF PHOSPHORUS: CPT | Mod: HCNC | Performed by: STUDENT IN AN ORGANIZED HEALTH CARE EDUCATION/TRAINING PROGRAM

## 2022-01-11 PROCEDURE — 94761 N-INVAS EAR/PLS OXIMETRY MLT: CPT | Mod: HCNC

## 2022-01-11 PROCEDURE — 80048 BASIC METABOLIC PNL TOTAL CA: CPT | Mod: HCNC | Performed by: STUDENT IN AN ORGANIZED HEALTH CARE EDUCATION/TRAINING PROGRAM

## 2022-01-11 PROCEDURE — 97116 GAIT TRAINING THERAPY: CPT | Mod: HCNC

## 2022-01-11 PROCEDURE — 25000003 PHARM REV CODE 250: Mod: HCNC | Performed by: OTOLARYNGOLOGY

## 2022-01-11 PROCEDURE — 85025 COMPLETE CBC W/AUTO DIFF WBC: CPT | Mod: HCNC | Performed by: STUDENT IN AN ORGANIZED HEALTH CARE EDUCATION/TRAINING PROGRAM

## 2022-01-11 PROCEDURE — 92507 TX SP LANG VOICE COMM INDIV: CPT | Mod: HCNC

## 2022-01-11 PROCEDURE — 82330 ASSAY OF CALCIUM: CPT | Mod: 91,HCNC | Performed by: STUDENT IN AN ORGANIZED HEALTH CARE EDUCATION/TRAINING PROGRAM

## 2022-01-11 PROCEDURE — 82330 ASSAY OF CALCIUM: CPT | Mod: HCNC | Performed by: STUDENT IN AN ORGANIZED HEALTH CARE EDUCATION/TRAINING PROGRAM

## 2022-01-11 PROCEDURE — 97535 SELF CARE MNGMENT TRAINING: CPT | Mod: HCNC

## 2022-01-11 PROCEDURE — 63600175 PHARM REV CODE 636 W HCPCS: Mod: HCNC | Performed by: STUDENT IN AN ORGANIZED HEALTH CARE EDUCATION/TRAINING PROGRAM

## 2022-01-11 PROCEDURE — 20600001 HC STEP DOWN PRIVATE ROOM: Mod: HCNC

## 2022-01-11 PROCEDURE — 99232 SBSQ HOSP IP/OBS MODERATE 35: CPT | Mod: HCNC,,, | Performed by: NURSE PRACTITIONER

## 2022-01-11 PROCEDURE — 63700000 PHARM REV CODE 250 ALT 637 W/O HCPCS: Mod: HCNC | Performed by: STUDENT IN AN ORGANIZED HEALTH CARE EDUCATION/TRAINING PROGRAM

## 2022-01-11 PROCEDURE — 99232 PR SUBSEQUENT HOSPITAL CARE,LEVL II: ICD-10-PCS | Mod: HCNC,,, | Performed by: NURSE PRACTITIONER

## 2022-01-11 RX ADMIN — CALCITRIOL 0.5 MCG: 1 SOLUTION ORAL at 12:01

## 2022-01-11 RX ADMIN — AMPICILLIN SODIUM AND SULBACTAM SODIUM 3 G: 2; 1 INJECTION, POWDER, FOR SOLUTION INTRAMUSCULAR; INTRAVENOUS at 11:01

## 2022-01-11 RX ADMIN — Medication 800 MG: at 09:01

## 2022-01-11 RX ADMIN — AMPICILLIN SODIUM AND SULBACTAM SODIUM 3 G: 2; 1 INJECTION, POWDER, FOR SOLUTION INTRAMUSCULAR; INTRAVENOUS at 04:01

## 2022-01-11 RX ADMIN — ACETAMINOPHEN 650 MG: 160 SOLUTION ORAL at 06:01

## 2022-01-11 RX ADMIN — DILTIAZEM HYDROCHLORIDE 30 MG: 30 TABLET, FILM COATED ORAL at 06:01

## 2022-01-11 RX ADMIN — CALCIUM CARBONATE 1500 MG: 1250 SUSPENSION ORAL at 04:01

## 2022-01-11 RX ADMIN — CALCIUM CARBONATE 1500 MG: 1250 SUSPENSION ORAL at 09:01

## 2022-01-11 RX ADMIN — CALCIUM CARBONATE 1500 MG: 1250 SUSPENSION ORAL at 08:01

## 2022-01-11 RX ADMIN — APIXABAN 5 MG: 5 TABLET, FILM COATED ORAL at 09:01

## 2022-01-11 RX ADMIN — ACETAMINOPHEN 650 MG: 160 SOLUTION ORAL at 12:01

## 2022-01-11 RX ADMIN — FAMOTIDINE 20 MG: 20 TABLET ORAL at 09:01

## 2022-01-11 RX ADMIN — Medication 800 MG: at 12:01

## 2022-01-11 RX ADMIN — DILTIAZEM HYDROCHLORIDE 30 MG: 30 TABLET, FILM COATED ORAL at 12:01

## 2022-01-11 RX ADMIN — INSULIN ASPART 1 UNITS: 100 INJECTION, SOLUTION INTRAVENOUS; SUBCUTANEOUS at 04:01

## 2022-01-11 RX ADMIN — INSULIN ASPART 1 UNITS: 100 INJECTION, SOLUTION INTRAVENOUS; SUBCUTANEOUS at 12:01

## 2022-01-11 RX ADMIN — AMPICILLIN SODIUM AND SULBACTAM SODIUM 3 G: 2; 1 INJECTION, POWDER, FOR SOLUTION INTRAMUSCULAR; INTRAVENOUS at 06:01

## 2022-01-11 RX ADMIN — LEVOTHYROXINE SODIUM 112 MCG: 112 TABLET ORAL at 06:01

## 2022-01-11 RX ADMIN — FAMOTIDINE 20 MG: 20 TABLET ORAL at 08:01

## 2022-01-11 RX ADMIN — APIXABAN 5 MG: 5 TABLET, FILM COATED ORAL at 08:01

## 2022-01-11 NOTE — SUBJECTIVE & OBJECTIVE
Interval History/Significant Events: No acute events overnight. Mr Batres is doing well doing.  Reports good pain control and had 1 large BM yesterday after increasing his bowel regimen.     Follow-up For: Procedure(s) (LRB):  LARYNGECTOMY (N/A)  DISSECTION, NECK (Bilateral)  CREATION, FREE FLAP (Right)  THYROIDECTOMY  LARYNGOSCOPY (N/A)  REIMPLANTATION, PARATHYROID TISSUE (N/A)    Post-Operative Day: 5 Days Post-Op    Objective:     Vital Signs (Most Recent):  Temp: 98 °F (36.7 °C) (01/11/22 0316)  Pulse: 66 (01/11/22 0600)  Resp: 17 (01/11/22 0600)  BP: 132/68 (01/11/22 0600)  SpO2: 98 % (01/11/22 0600) Vital Signs (24h Range):  Temp:  [97.7 °F (36.5 °C)-98.1 °F (36.7 °C)] 98 °F (36.7 °C)  Pulse:  [54-73] 66  Resp:  [13-20] 17  SpO2:  [97 %-100 %] 98 %  BP: (115-141)/(62-76) 132/68     Weight: 66 kg (145 lb 8.1 oz)  Body mass index is 23.48 kg/m².      Intake/Output Summary (Last 24 hours) at 1/11/2022 0644  Last data filed at 1/11/2022 0500  Gross per 24 hour   Intake 1535.64 ml   Output 1393 ml   Net 142.64 ml       Physical Exam  Constitutional:       General: He is not in acute distress.  HENT:      Head: Normocephalic.      Nose: Nose normal.      Mouth/Throat:      Mouth: Mucous membranes are dry.   Eyes:      Extraocular Movements: Extraocular movements intact.      Pupils: Pupils are equal, round, and reactive to light.   Neck:      Comments: Neck drains with light bloody output  Free flap with good pulsation on doppler.   Cardiovascular:      Rate and Rhythm: Normal rate and regular rhythm.      Pulses: Normal pulses.      Heart sounds: Normal heart sounds.   Pulmonary:      Effort: Pulmonary effort is normal. No respiratory distress.      Breath sounds: Normal breath sounds.      Comments: Paul tube with trach collar   Abdominal:      General: Abdomen is flat. Bowel sounds are normal. There is no distension.      Palpations: Abdomen is soft.   Musculoskeletal:      Comments: RLE excision for flap with 1x  drain in place   Skin:     General: Skin is warm and dry.   Neurological:      General: No focal deficit present.      Mental Status: He is alert.   Psychiatric:         Mood and Affect: Mood normal.         Vents:  Oxygen Concentration (%): 28 (01/09/22 1306)    Lines/Drains/Airways     Drain                 Gastrostomy/Enterostomy 12/27/21 1152 Percutaneous endoscopic gastrostomy (PEG) LUQ 14 days         Closed/Suction Drain 01/06/22 Right Thigh Bulb 15 Fr. 5 days         Closed/Suction Drain 01/06/22 Right;Anterior Thigh Bulb 15 Fr. 5 days         Closed/Suction Drain 01/06/22 1521 Right Neck Bulb 15 Fr. 4 days         Closed/Suction Drain 01/06/22 1524 Left Neck Bulb 15 Fr. 4 days          Airway                 Laryngectomy (Do Not Remove) 01/06/22 1655 Laryngectomy tube 4 days          Peripheral Intravenous Line                 Midline Catheter Insertion/Assessment  - Single Lumen 01/02/22 1410 Right basilic vein (medial side of arm) other (see comments) 8 days         Peripheral IV - Single Lumen 01/10/22 0000 20 G Anterior;Left Forearm 1 day                Significant Labs:    CBC/Anemia Profile:  Recent Labs   Lab 01/10/22  0259 01/11/22  0343   WBC 8.60 8.26   HGB 9.2* 9.2*   HCT 27.9* 27.4*    441   MCV 86 84   RDW 12.9 12.9        Chemistries:  Recent Labs   Lab 01/10/22  0259 01/11/22  0343    137   K 4.1 3.9    103   CO2 26 26   BUN 22 21   CREATININE 0.7 0.7   CALCIUM 8.1* 8.4*   MG 1.9 1.7   PHOS 4.0 3.9       Significant Imaging:  I have reviewed all pertinent imaging results/findings within the past 24 hours.

## 2022-01-11 NOTE — PT/OT/SLP PROGRESS
[facility]  Total Laryngectomy Treatment Note  Co-treatment with OT    Patient Name:  Cyrus Batres Jr.   MRN:  [id number]  Admitting Diagnosis: Larynx neoplasm malignant    PATIENT IS A NECK-BREATHER - DO NOT ORALLY INTUBATE    Recommendations:                 General Recommendations:  post-op education  Diet recommendations:  Patient NPO until cleared by ENT  General Precautions: Standard, fall,respiratory    Communication:      Communication strategies: Eyeglasses, Provide increased time to answer, Go to room if call light pushed, Encourage use of communication device (AL) and Whiteboard/Paper-pencil provided   Patient communicating all wants and needs? yes   Supplies labeled with patient information?  yes   RN notified if supplies brought to patient's room?  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       Past Surgical History:   Procedure Laterality Date    DIRECT LARYNGOBRONCHOSCOPY N/A 12/27/2021    Procedure: LARYNGOSCOPY, DIRECT, WITH BRONCHOSCOPY;  Surgeon: Flex Espinosa MD;  Location: [address];  Service: ENT;  Laterality: N/A;    DISSECTION OF NECK Bilateral 1/6/2022    Procedure: DISSECTION, NECK;  Surgeon: Jesse James MD;  Location: [address];  Service: ENT;  Laterality: Bilateral;    FLAP PROCEDURE Right 1/6/2022    Procedure: CREATION, FREE FLAP;  Surgeon: Alise Hart MD;  Location: [address];  Service: ENT;  Laterality: Right;  Ischemic start 1351  Ischemic stop 1502    LARYNGECTOMY N/A 1/6/2022    Procedure: LARYNGECTOMY;  Surgeon: Jesse James MD;  Location: [address];  Service: ENT;  Laterality: N/A;    LARYNGOSCOPY N/A 8/4/2020    Procedure: Suspension microlaryngoscopy with biopsy, possible KTP laser treatment/excision;  Surgeon:  Stew Noel MD;  [facility];  Service: ENT;  Laterality: N/A;  Microscope, telescopes, tower, microinstruments, KTP laser, rep conf# [id number] IC 7/28.    LARYNGOSCOPY N/A 3/16/2021    Procedure: Suspension microlaryngoscopy with excision of lesion, possible CO2 laser;  Surgeon: Stew Noel MD;  [facility];  Service: ENT;  Laterality: N/A;  Microscope, telescopes, tower, microinstruments, CO2 laser, rep conf# [id number] IC 3/4.    LARYNGOSCOPY N/A 4/1/2021    Procedure: Suspension microlaryngoscopy with KTP laser excision of lesion;  Surgeon: Stew Noel MD;  [facility];  Service: ENT;  Laterality: N/A;  Microscope, telescopes, tower, microinstruments, 70 degree scope, vocal fold , KTP laser, rep conf# [id number] BC    LARYNGOSCOPY N/A 12/9/2021    Procedure: Suspension microlaryngoscopy with biopsy;  Surgeon: Stew Noel MD;  [facility];  Service: ENT;  Laterality: N/A;  Microscope, telescopes, tower, microinstruments    LARYNGOSCOPY N/A 1/6/2022    Procedure: LARYNGOSCOPY;  Surgeon: Jesse James MD;  Location: [facility] OR [address] [place] [address];  Service: ENT;  Laterality: N/A;    MICROLARYNGOSCOPY N/A 3/17/2020    Procedure: MICROLARYNGOSCOPY;  Surgeon: Jung Xiao MD;  Location: [facility] beds at [facility];  Service: ENT;  Laterality: N/A;  Laser Microlaryngoscopy  NEED TO SCHEDULE LASER from [employer] [place] [phone]    REIMPLANTATION OF PARATHYROID TISSUE N/A 1/6/2022    Procedure: REIMPLANTATION, PARATHYROID TISSUE;  Surgeon: Jesse James MD;  [facility];  Service: ENT;  Laterality: N/A;    THYROIDECTOMY  1/6/2022    Procedure: THYROIDECTOMY;  Surgeon: Jesse James MD;  [facility];  Service: ENT;;    TRACHEOSTOMY N/A 12/27/2021    Procedure: CREATION, TRACHEOSTOMY;  Surgeon: Flex Espinosa MD;  Location: [facility] OR [address] [place] [address];  Service: ENT;  Laterality: N/A;       Date of Surgery:  "1/6/22    The patient received a total laryngectomy with bilateral neck dissection.  PEG ?  yes  TEP placed at time of surgery?  no    Subjective:     Awake/alert    Pain/Comfort:  Pain Rating 1: 0/10  Pain Rating Post-Intervention 1: 0/10  Location 2: abdomen  Pain Addressed 2: Reposition,Distraction    Respiratory Status: Room air    Objective:     Communication:  · Gestures/Facial Expressions  · Mouthing Words  · Artificial Larynx Trials:  Yes  Placement:  Able to achieve independently  · Timing:  Using appropriately with short phrases  · Intelligibility:  Phrase 85%    Additional Treatment:  Pt repositioned upright in bed for ongoing education. Mp tube with HME in place throughout session. Pt walked to bathroom with OT and completed stoma care with min cues. Pt placed clean mp tube with fresh HME back into stoma.  Pt recalled information about post-op kit with 80% accy ind'ly. He utilized AL throughout session. He completed stoma care     Education:     General Post-Operative Education Provided:  · SLP discussed:   Anatomical changes that will occur in respiration, communication, coughing, sneezing, blowing nose, and swallowing   · Timelines of swallowing assessments and progression of textures were detailed  · Patient will be a total neck breather and that in the event of emergency, oxygen must be provided to stoma   · Patient advised to contact local fire department to update emergency personnel that patient will be a "total neck breather"   · Educated on stoma care and how to perform   · Use and benefits of heat and moisture exchanges (HMEs) to provide humidity to stoma   · Basic surgical course and recovery  · Expectations for follow up and discharge   · Lifestyle changes, including importance of avoiding water to stoma provided.  · SLP demonstrated use of an artificial larynx (AL) on neck and external cheek. Discussed AL speech with an oral adaptor.  · Patient engaged in education and demonstrating " adequate understanding of all topics reviewed.    Alternate Airway Precautions in Place:  · All laryngectomy precautions present: Orange signs above HOB and on door, communication impaired wristband, Pediatric ambu bag mask (size 3)  ·  notified patient has laryngectomy and to call RN immediately when patient presses call bell.  · RN notified to enter room immediately when called to attend patient.    Impressions:  · Patient would benefit from ongoing education/support of  Post-operative anatomy, physical and functional limitations  · Permanency of thacheostoma  · Contents of post-laryngectomy kit  · The ability to perform Stoma care.    Goals:   Multidisciplinary Problems     SLP Goals        Problem: SLP Goal    Goal Priority Disciplines Outcome   SLP Goal     SLP Ongoing, Progressing   Description: Speech Language Pathology Goals  Goals expected to be met by 1/15    1. Pt/family will actively participate in post-laryngectomy education to facilitate [place] and desensitization  2. Pt will communicate contextualized phrases via AL with approximately 60% intelligibility provided min cues to improve communication.  3. Pt/family will perform stoma care x1 per session provided min cues to facilitate [place].                           Plan:     · Patient to be seen:  5 x/week   · Plan of Care expires:  02/06/22  · Plan of Care reviewed with:  patient   · SLP Follow-Up:  Yes       Discharge recommendations:  outpatient speech therapy       Time Tracking:     SLP Treatment Date:   01/11/22  Speech Start Time:  0835  Speech Stop Time:  0858     Speech Total Time (min):  23 min    Billable Minutes: Speech Therapy Individual 10 and Self Care/Home Management Training 13    01/11/2022

## 2022-01-11 NOTE — PLAN OF CARE
"SICU PLAN OF CARE NOTE    Dx: Larynx neoplasm malignant    Goals of Care:  MAP >65; SPO2 >90; Flap checks Q2; Q4 glucose    Vital Signs:  /67   Pulse 72   Temp 98 °F (36.7 °C) (Oral)   Resp 16   Ht 5' 6" (1.676 m)   Wt 66 kg (145 lb 8.1 oz)   SpO2 98%   BMI 23.48 kg/m²     Cardiac:  NSR 60s, SB lowest 58 while sleeping    Resp:  SpO2 % on Room Air    Neuro:  AAO x4, Follows Commands, and Moves All Extremities    Gtts:  Insulin 0.3 units/hr    Urine Output:  Voids Spontaneously 700 cc/shift    Drains:  GLENN Drain, total output 0 cc / shift    Flap Checks Q2 hours. Flap noted to be pale in color, warm to touch, with strong pulse present per doppler. Open to air and closely approximated wound edges.    Diet:  NPO and Tube Feeds; Glucerna 1.5 at goal 50 ml/hr and tolerating feedings at this time     Labs/Accuchecks:  Q4 Glucose monitoring. Labs reviewed     Skin:  Right leg continues with staples intact. Open to air and closely approximated. Staples intact to neck incision with suture X1 in place. Flap site pale with pulses present.  Patient turns self, waffle mattress inflated, ICU bed working correctly.    Shift Events:  Communicates needs effectively. Complains of no pain or discomfort. Turns self and moves all extremities. Laryngectomy tube in place. Insulin gtt continues with patient also requiring PRN coverage per order.  See flowsheet for further assessment/details.  Patient updated on current condition/plan of care, questions answered, and emotional support provided.   MD updated on current condition, vitals, labs, and gtts.  No new orders received, will continue to monitor.     "

## 2022-01-11 NOTE — PROGRESS NOTES
[facility] - [facility] Int[facility]  Endocrinology  Progress Note    Admit Date: 12/25/2021     Reason for Consult: Management of T2DM, Hyperglycemia     Surgical Procedure and Date:   12/27/21  CREATION, TRACHEOSTOMY (N/A)  LARYNGOSCOPY, DIRECT, WITH BRONCHOSCOPY (N/A)  INSERTION, PEG TUBE (N/A)     Diabetes diagnosis year: 2010    Home Diabetes Medications:  Ozempic stopped on 10/18/21 by Dena Silveira as a1c below goal     How often checking glucose at home?  Once daily    BG readings on regimen: 150s  Hypoglycemia on the regimen?  No  Missed doses on regimen?  n/a    Diabetes Complications include:     None     Complicating diabetes co morbidities:   pAfib, HTN, active cancer       HPI:   Patient is a 71 y.o. 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 He presented as a transfer from [facility] for ENT evaluation. He initially presented to the ER for worsening hemoptysis. Laryngeal videostroboscopy 11/8/21 notable for webbing across membranous vocal folds, granular changes infraglottically, firbinous debris across anterior commissure, post surgical stiffness of bilateral true vocal folds, phonatory supraglottic hyperfunction, occasional plica ventricularis, thick salivary secretions, pooled in pyriforms, diffuse mild laryngopharyngeal edema.He is now s/p the above procedure. He was last seen by MISSY Gutierrez on 10/18/21 for DM management. Endocrinology consulted for management of T2DM.            Lab Results   Component Value Date    HGBA1C 5.5 10/05/2021           Interval HPI:   Overnight events:  BG stable, but starting to trend upwards with increase in enteral nutrition rates. Endo will continue to follow, and manage glycemic control while inpatient.   Diet NPO.  5 Days Post-Op    Eating:   NPO  Nausea: No  Hypoglycemia and intervention: No   Fever: No  TPN and/or TF: No  If yes, type of TF/TPN and rate: Glucerna 50  "ml/hr.    /74   Pulse 70   Temp 98.6 °F (37 °C) (Oral)   Resp 18   Ht 5' 6" (1.676 m)   Wt 66 kg (145 lb 8.1 oz)   SpO2 100%   BMI 23.48 kg/m²     Labs Reviewed and Include    Recent Labs   Lab 01/11/22  0343   *   CALCIUM 8.4*      K 3.9   CO2 26      BUN 21   CREATININE 0.7     Lab Results   Component Value Date    WBC 8.26 01/11/2022    HGB 9.2 (L) 01/11/2022    HCT 27.4 (L) 01/11/2022    MCV 84 01/11/2022     01/11/2022     No results for input(s): TSH, FREET4 in the last 168 hours.  Lab Results   Component Value Date    HGBA1C 5.9 (H) 01/05/2022       Nutritional status:   Body mass index is 23.48 kg/m².  Lab Results   Component Value Date    ALBUMIN 1.9 (L) 01/07/2022    ALBUMIN 2.6 (L) 01/06/2022    ALBUMIN 2.8 (L) 01/05/2022     No results found for: PREALBUMIN    Estimated Creatinine Clearance: 88.6 mL/min (based on SCr of 0.7 mg/dL).    Accu-Checks  Recent Labs     01/10/22  0034 01/10/22  0258 01/10/22  0841 01/10/22  1124 01/10/22  1600 01/10/22  2055 01/11/22  0019 01/11/22  0344 01/11/22  0922 01/11/22  1248   POCTGLUCOSE 163* 164* 144* 189* 155* 177* 151* 163* 153* 199*       Current Medications and/or Treatments Impacting Glycemic Control  Immunotherapy:    Immunosuppressants     None        Steroids:   Hormones (From admission, onward)            None        Pressors:    Autonomic Drugs (From admission, onward)            None        Hyperglycemia/Diabetes Medications:   Antihyperglycemics (From admission, onward)            Start     Stop Route Frequency Ordered    01/10/22 1145  insulin regular in 0.9 % NaCl 100 unit/100 mL (1 unit/mL) infusion        Question:  Enter initial dose (Units/hr):  Answer:  0.3    -- IV Continuous 01/10/22 1136    01/07/22 1149  insulin aspart U-100 pen 0-5 Units         -- SubQ As needed (PRN) 01/07/22 1049          ASSESSMENT and PLAN    * Larynx neoplasm malignant  Managed per primary team  Optimize BG control        Type 2 " diabetes mellitus with hyperglycemia, without long-term current use of insulin  BG goal 140-180    - Increase transition IV insulin infusion with stepdown parameters. Initial rate starting at 0.4 units/hr. BG trending upwards while on TF.  - Low Dose SQ Insulin Correction Scale.  - BG monitoring every 4 hours while NPO.     ** Please call Endocrine for any BG related issues **  ** Please notify Endocrine for any change and/or advance in diet**    Lab Results   Component Value Date    HGBA1C 5.9 (H) 01/05/2022     Discharge Planning:   TBD. Please notify endocrinology prior to discharge.         Hyperlipidemia  Managed per primary team  Condition may cause insulin resistance         Paroxysmal atrial fibrillation  Managed per primary team  Condition may cause insulin resistance         Adverse effect of adrenal cortical steroids, sequela  On steroid therapy per transplant team; may elevate BG readings        On enteral nutrition  Enteral nutrition may increase blood glucose.  Optimize BG control            Vargas Cortez NP  Endocrinology  [facility]

## 2022-01-11 NOTE — CONSULTS
"[facility] - [facility] Intensive Care  Adult Nutrition  Progress Note    SUMMARY       Recommendations    1. Continue Glucerna 1.5 advancing as tolerated until goal of 50 mL/hr providing pt with 1800 kcal, 99 g protein, and 911 mL free water   Bolus: Glucerna 1.5 5 cans/day - 1780 kcal, 98 g protein, and 900 mL free water    2. Monitor and follow-up    Goals: meet % EEN/EPN by RD follow-up  Nutrition Goal Status: goal met  Communication of RD Recs:  (POC)    Assessment and Plan    Nutrition Problem  Excessive Protein Intake     Related to (etiology):   Excessive Enteral Nutrition Infusion     Signs and Symptoms (as evidenced by):   TF meeting >110% EPN     Intervention (treatment strategy):  Collaboration of nutrition care with other providers  Enteral Nutrition      Nutrition Diagnosis Status:   Resolved    Reason for Assessment    Reason For Assessment: consult,RD follow-up  Diagnosis:  (Larynx neoplasm malignant)  Relevant Medical History: AFib, DM, HTN  Interdisciplinary Rounds: did not attend    General Information Comments: S/p PEG, laryngectomy, neck dissection, free flap creation, thyroidectomy, and parathyroid reimplantation. Pt sitting up in chair. No s/s of n/v/d/c. Tolerating TFs @ goal. Doing well with PT/OT. Per RD assessment - Pt reports good po intake with no significant wt loss PTA.  lbs. Pt does not meet malnutrition criteria.    Nutrition Discharge Planning: adequate nutrition via TF    Nutrition Risk Screen    Nutrition Risk Screen: tube feeding or parenteral nutrition    Nutrition/Diet History    Spiritual, Cultural Beliefs, Mandaen Practices, Values that Affect Care: no  Food Allergies: NKFA  Factors Affecting Nutritional Intake: altered gastrointestinal function    Anthropometrics    Temp: 98.6 °F (37 °C)  Height Method: Stated  Height: 5' 6" (167.6 cm)  Height (inches): 66 in  Weight Method: Bed Scale  Weight: 66 kg (145 lb 8.1 oz)  Weight (lb): 145.51 lb  Ideal Body Weight (IBW), " Male: 142 lb  % Ideal Body Weight, Male (lb): 102.47 %  BMI (Calculated): 23.5  BMI Grade: 18.5-24.9 - normal       Lab/Procedures/Meds    Pertinent Labs Reviewed: reviewed  Pertinent Labs Comments: glu 165  Pertinent Medications Reviewed: reviewed  Pertinent Medications Comments: calcitriol, docusate, famotidine, levo, insulin    Estimated/Assessed Needs    Weight Used For Calorie Calculations: 66 kg (145 lb 8.1 oz)  Energy Calorie Requirements (kcal): 1704 kcal/day  Energy Need Method: [employer] Jeor (PAL 1.25)  Protein Requirements: 79-99 g/day (1.2-1.5 g/kg)  Weight Used For Protein Calculations: 66 kg (145 lb 8.1 oz)  Fluid Requirements (mL): 1700 mL (1 mL/kcal or per MD)  Estimated Fluid Requirement Method: RDA Method  RDA Method (mL): 1704  CHO Requirement: 212 gm/day      Nutrition Prescription Ordered    Current Diet Order: NPO  Current Nutrition Support Formula Ordered: Glucerna 1.5  Current Nutrition Support Rate Ordered: 50 (ml)  Current Nutrition Support Frequency Ordered: mL/hr    Evaluation of Received Nutrient/Fluid Intake    Enteral Calories (kcal): 1800  Enteral Protein (gm): 99  Enteral (Free Water) Fluid (mL): 911  % Kcal Needs: 106  % Protein Needs: 100  I/O: +4.7L since admit  Energy Calories Required: meeting needs  Protein Required: meeting needs  Fluid Required:  (per MD)  Comments: LBM: 1/10  Tolerance: tolerating  % Intake of Estimated Energy Needs: 75 - 100 %  % Meal Intake: NPO    Nutrition Risk    Level of Risk/Frequency of Follow-up:  (f/u 1 x wk)     Monitor and Evaluation    Food and Nutrient Intake: energy intake,enteral nutrition intake  Food and Nutrient Adminstration: enteral and parenteral nutrition administration  Physical Activity and Function: nutrition-related ADLs and IADLs  Anthropometric Measurements: weight,weight change,body mass index  Biochemical Data, Medical Tests and Procedures: electrolyte and renal panel,gastrointestinal profile,glucose/endocrine  profile,inflammatory profile,lipid profile  Nutrition-Focused Physical Findings: overall appearance     Nutrition Follow-Up    RD Follow-up?: Yes

## 2022-01-11 NOTE — PT/OT/SLP PROGRESS
Occupational Therapy  Partial co- Treatment    Name: Cyrus Batres Jr.  MRN: [id number]  Admitting Diagnosis:  Larynx neoplasm malignant  5 Days Post-Op    Recommendations:     Discharge Recommendations: home      Assessment:     Cyrus Batres Jr. is a 71 y.o. male with a medical diagnosis of Larynx neoplasm malignant.   Performance deficits affecting function are weakness,impaired endurance,impaired self care skills,impaired functional mobilty,gait instability,impaired balance. Pt tolerated session well with good effort, performance and progress. Continue to anticipate pt will progress well and will be ready for d/c home with family support when medically stable.     Rehab Prognosis:  Good; patient would benefit from acute skilled OT services to address these deficits and reach maximum level of function.       Plan:     Patient to be seen 3 x/week to address the above listed problems via self-care/home management,therapeutic activities,therapeutic exercises  · Plan of Care Expires: 12/30/21  · Plan of Care Reviewed with: patient    Subjective   Pt agreeable to therapy.   Pt denies pain.   Pain/Comfort:  · Pain Rating 1: 0/10    Objective:     Communicated with: nsg prior to session.  Patient found in bed with PEG tube, GLENN drains, tele, pulse ox and BP cuff.  Co-tx completed for part of session with SLP to optimize functional performance.     General Precautions: Standard, fall,respiratory  Pt is neck breather; s/p laryngectomy.      Occupational Performance:     Bed Mobility:    Supine>sit with supervision.    Functional Mobility/Transfers:  · Sit>stand with SBA from bed, chair and commode.     Activities of Daily Living:  · G/H; standing with SBA  Toileting: SBA       [facility] 6 Click ADL: 18    Treatment & Education:  Pt demo close SBA for functional mobility in room. Pt tolerated standing at sink nearly 11 min for oral care and education from SLP re: stoma care. Pt with good attention to education as well as Good  balance noted with B UE  integration. Pt denied pain.   Pt completed several small radius turns in bathroom to allow for t/f to commode and don't of lines. Pt requiring SBA.   Education provided re: OT POC and safety with functional mobility/ADl skills.     Patient left up in chair with all lines intact, call button in reach and nsg notifiedEducation:      GOALS:   Multidisciplinary Problems     Occupational Therapy Goals        Problem: Occupational Therapy Goal    Goal Priority Disciplines Outcome Interventions   Occupational Therapy Goal     OT, PT/OT Ongoing, Progressing    Description: Goals to be met by: 7 days 1/16/22     Patient will increase functional independence with ADLs by performing:    Pt to complete UE dressing with set-up  Pt to complete LE dressing with Set-up  Pt to complete standing g/h skills with supervision.  Pt to complete t/f to commode with supervision.  Pt to complete toileting with supervision.              Multidisciplinary Problems (Resolved)        Problem: Occupational Therapy Goal    Goal Priority Disciplines Outcome Interventions   Occupational Therapy Goal   (Resolved)     OT, PT/OT Met    Description: Skilled OT services not necessary at this time secondary to patient performing ADLs at [facility]. Patient in agreement. Please re-consult OT if change in patient's functional status is noted.                    Time Tracking:     OT Date of Treatment: 01/11/22  OT Start Time: 0840  OT Stop Time: 0908  OT Total Time (min): 28 min    Billable Minutes:Self Care/Home Management 28    OT/HENRY: OT     HENRY Visit Number: 0    1/11/2022

## 2022-01-11 NOTE — PLAN OF CARE
Problem: SLP Goal  Goal: SLP Goal  Description: Speech Language Pathology Goals  Goals expected to be met by 1/15    1. Pt/family will actively participate in post-laryngectomy education to facilitate [place] and desensitization  2. Pt will communicate contextualized phrases via AL with approximately 60% intelligibility provided min cues to improve communication.  3. Pt/family will perform stoma care x1 per session provided min cues to facilitate [place].          Outcome: Ongoing, Progressing   Continue POC at this time, all goals remain appropriate   1/11/2022

## 2022-01-11 NOTE — PROGRESS NOTES
[facility]  Critical Care - Surgery  Progress Note    Patient Name: Cyrus Batres Jr.  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   Doing well post operatively.  Pain well controlled.  TF advance to goal.  Deescalated flap checks to q2hr.       Interval History/Significant Events: No acute events overnight. Mr Batres is doing well doing.  Reports good pain control and had 1 large BM yesterday after increasing his bowel regimen.     Follow-up For: Procedure(s) (LRB):  LARYNGECTOMY (N/A)  DISSECTION, NECK (Bilateral)  CREATION, FREE FLAP (Right)  THYROIDECTOMY  LARYNGOSCOPY (N/A)  REIMPLANTATION, PARATHYROID TISSUE (N/A)    Post-Operative Day: 5 Days Post-Op    Objective:     Vital Signs (Most Recent):  Temp: 98 °F (36.7 °C) (01/11/22 0316)  Pulse: 66 (01/11/22 0600)  Resp: 17 (01/11/22 0600)  BP: 132/68 (01/11/22 0600)  SpO2: 98 % (01/11/22 0600) Vital Signs (24h Range):  Temp:  [97.7 °F (36.5 °C)-98.1 °F (36.7 °C)] 98 °F (36.7 °C)  Pulse:  [54-73] 66  Resp:  [13-20] 17  SpO2:  [97 %-100 %] 98 %  BP: (115-141)/(62-76) 132/68     Weight: 66 kg (145 lb 8.1 oz)  Body mass index is 23.48 kg/m².      Intake/Output Summary (Last 24 hours) at 1/11/2022 0644  Last data filed at 1/11/2022 0500  Gross per 24 hour   Intake 1535.64 ml   Output 1393 ml   Net 142.64 ml       Physical Exam  Constitutional:       General: He is not in acute distress.  HENT:      Head: Normocephalic.      Nose: Nose normal.      Mouth/Throat:      Mouth: Mucous membranes are dry.   Eyes:      Extraocular Movements: Extraocular movements intact.      Pupils: Pupils are equal,  round, and reactive to light.   Neck:      Comments: Neck drains with light bloody output  Free flap with good pulsation on doppler.   Cardiovascular:      Rate and Rhythm: Normal rate and regular rhythm.      Pulses: Normal pulses.      Heart sounds: Normal heart sounds.   Pulmonary:      Effort: Pulmonary effort is normal. No respiratory distress.      Breath sounds: Normal breath sounds.      Comments: Paul tube with trach collar   Abdominal:      General: Abdomen is flat. Bowel sounds are normal. There is no distension.      Palpations: Abdomen is soft.   Musculoskeletal:      Comments: RLE excision for flap with 1x drain in place   Skin:     General: Skin is warm and dry.   Neurological:      General: No focal deficit present.      Mental Status: He is alert.   Psychiatric:         Mood and Affect: Mood normal.         Vents:  Oxygen Concentration (%): 28 (01/09/22 1306)    Lines/Drains/Airways     Drain                 Gastrostomy/Enterostomy 12/27/21 1152 Percutaneous endoscopic gastrostomy (PEG) LUQ 14 days         Closed/Suction Drain 01/06/22 Right Thigh Bulb 15 Fr. 5 days         Closed/Suction Drain 01/06/22 Right;Anterior Thigh Bulb 15 Fr. 5 days         Closed/Suction Drain 01/06/22 1521 Right Neck Bulb 15 Fr. 4 days         Closed/Suction Drain 01/06/22 1524 Left Neck Bulb 15 Fr. 4 days          Airway                 Laryngectomy (Do Not Remove) 01/06/22 1655 Laryngectomy tube 4 days          Peripheral Intravenous Line                 Midline Catheter Insertion/Assessment  - Single Lumen 01/02/22 1410 Right basilic vein (medial side of arm) other (see comments) 8 days         Peripheral IV - Single Lumen 01/10/22 0000 20 G Anterior;Left Forearm 1 day                Significant Labs:    CBC/Anemia Profile:  Recent Labs   Lab 01/10/22  0259 01/11/22  0343   WBC 8.60 8.26   HGB 9.2* 9.2*   HCT 27.9* 27.4*    441   MCV 86 84   RDW 12.9 12.9        Chemistries:  Recent Labs   Lab 01/10/22  0259  01/11/22  0343    137   K 4.1 3.9    103   CO2 26 26   BUN 22 21   CREATININE 0.7 0.7   CALCIUM 8.1* 8.4*   MG 1.9 1.7   PHOS 4.0 3.9       Significant Imaging:  I have reviewed all pertinent imaging results/findings within the past 24 hours.    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  Patient is admitted HDS and on trach collar.       Neuro/Psych:   -- off sedation, doing well  -- Pain control: PRN tylenol, oxycodone 5mg solution, and breakthrough IV dilaudid      Cardiovascular:   #Atrial fibrillation   -- Continue home Diltiazem 30mg q6h   -- Restarted home Eliquis      -- Hemodynamics:  -- MAP goal >65  -- Currently HDS on no pressor support     Pulmonary/ENT:   #S/p Laryngectomy and total neck dissection and free flap reconsutrctionon 1/6/22  -- S/p trach on 12/27/21  -- now on RA  - q2h flap checks     Renal   -- Cr (Baseline): 0.7  -- BUN/Cr 37  BUN   Date Value Ref Range Status   01/11/2022 21 8 - 23 mg/dL Final   01/10/2022 22 8 - 23 mg/dL Final     Creatinine   Date Value Ref Range Status   01/11/2022 0.7 0.5 - 1.4 mg/dL Final   01/10/2022 0.7 0.5 - 1.4 mg/dL Final     -- Adequate urine output     FEN / GI:   -- advance TFs as tolerated. 10mL q12 hours for a max of 50  -- Ok to use G tube for medication use  -- No BM since [place] per the patient.    -- continue bowel regimen      -- Electrolytes:   Recent Labs   Lab 01/11/22  0343      K 3.9      CO2 26   BUN 21   CREATININE 0.7   MG 1.7      -- Nutrition:   -- replace electrolytes as needed to keep K>4, Mg>2  -- bowel reg - daily miralax, docusate  -- famotidine for GI prophylaxis     ID:   -- Tm: afebrile  -- WBC , trend daily     Heme/Onc:   -- H/H stable  -- past products received: none  Lab Results   Component  Value Date    WBC 8.26 01/11/2022    HGB 9.2 (L) 01/11/2022    HCT 27.4 (L) 01/11/2022    MCV 84 01/11/2022     01/11/2022        Endo:   #S/p thyroidectomy   -- Trend ionized Ca2+ q8h  -- continue 1500mg of Ca2+ q8h  -- continue calcitrol   -- continue levothyroxine    #DM2  -- endocrine following, appreciate recommendations  -- insulin ggt     PPx:   Feeding: Regular diet / NPO strict. TF adv as tolerated  Analgesia/Sedation: none  Thromboembolic prevention:LVX / SCD  HOB 30  Stress Ulcer ppx: famotidine  Glucose control: Critical care goal 140-180 g/dl  Turn q2 hrs     Dispo/Code Status/Palliative:   -- Stepped down to PCU         Critical care was time spent personally by me on the following activities: development of treatment plan with patient or surrogate and bedside caregivers, discussions with consultants, evaluation of patient's response to treatment, examination of patient, ordering and performing treatments and interventions, ordering and review of laboratory studies, ordering and review of radiographic studies, pulse oximetry, re-evaluation of patient's condition.  This critical care time did not overlap with that of any other provider or involve time for any procedures.     Ken Clark, DO  Critical Care - Surgery  [facility]

## 2022-01-11 NOTE — SUBJECTIVE & OBJECTIVE
"Interval HPI:   Overnight events:  BG stable, but starting to trend upwards with increase in enteral nutrition rates. Endo will continue to follow, and manage glycemic control while inpatient.   Diet NPO.  5 Days Post-Op    Eating:   NPO  Nausea: No  Hypoglycemia and intervention: No   Fever: No  TPN and/or TF: No  If yes, type of TF/TPN and rate: Glucerna 50 ml/hr.    /74   Pulse 70   Temp 98.6 °F (37 °C) (Oral)   Resp 18   Ht 5' 6" (1.676 m)   Wt 66 kg (145 lb 8.1 oz)   SpO2 100%   BMI 23.48 kg/m²     Labs Reviewed and Include    Recent Labs   Lab 01/11/22  0343   *   CALCIUM 8.4*      K 3.9   CO2 26      BUN 21   CREATININE 0.7     Lab Results   Component Value Date    WBC 8.26 01/11/2022    HGB 9.2 (L) 01/11/2022    HCT 27.4 (L) 01/11/2022    MCV 84 01/11/2022     01/11/2022     No results for input(s): TSH, FREET4 in the last 168 hours.  Lab Results   Component Value Date    HGBA1C 5.9 (H) 01/05/2022       Nutritional status:   Body mass index is 23.48 kg/m².  Lab Results   Component Value Date    ALBUMIN 1.9 (L) 01/07/2022    ALBUMIN 2.6 (L) 01/06/2022    ALBUMIN 2.8 (L) 01/05/2022     No results found for: PREALBUMIN    Estimated Creatinine Clearance: 88.6 mL/min (based on SCr of 0.7 mg/dL).    Accu-Checks  Recent Labs     01/10/22  0034 01/10/22  0258 01/10/22  0841 01/10/22  1124 01/10/22  1600 01/10/22  2055 01/11/22  0019 01/11/22  0344 01/11/22  0922 01/11/22  1248   POCTGLUCOSE 163* 164* 144* 189* 155* 177* 151* 163* 153* 199*       Current Medications and/or Treatments Impacting Glycemic Control  Immunotherapy:    Immunosuppressants     None        Steroids:   Hormones (From admission, onward)            None        Pressors:    Autonomic Drugs (From admission, onward)            None        Hyperglycemia/Diabetes Medications:   Antihyperglycemics (From admission, onward)            Start     Stop Route Frequency Ordered    01/10/22 1145  insulin regular in 0.9 % " NaCl 100 unit/100 mL (1 unit/mL) infusion        Question:  Enter initial dose (Units/hr):  Answer:  0.3    -- IV Continuous 01/10/22 1136    01/07/22 1149  insulin aspart U-100 pen 0-5 Units         -- SubQ As needed (PRN) 01/07/22 1045

## 2022-01-11 NOTE — ASSESSMENT & PLAN NOTE
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  Patient is admitted HDS and on trach collar.       Neuro/Psych:   -- off sedation, doing well  -- Pain control: PRN tylenol, oxycodone 5mg solution, and breakthrough IV dilaudid      Cardiovascular:   #Atrial fibrillation   -- Continue home Diltiazem 30mg q6h   -- Restarted home Eliquis      -- Hemodynamics:  -- MAP goal >65  -- Currently HDS on no pressor support     Pulmonary/ENT:   #S/p Laryngectomy and total neck dissection and free flap reconsutrctionon 1/6/22  -- S/p trach on 12/27/21  -- now on RA  - q2h flap checks     Renal   -- Cr (Baseline): 0.7  -- BUN/Cr 37  BUN   Date Value Ref Range Status   01/11/2022 21 8 - 23 mg/dL Final   01/10/2022 22 8 - 23 mg/dL Final     Creatinine   Date Value Ref Range Status   01/11/2022 0.7 0.5 - 1.4 mg/dL Final   01/10/2022 0.7 0.5 - 1.4 mg/dL Final     -- Adequate urine output     FEN / GI:   -- advance TFs as tolerated. 10mL q12 hours for a max of 50  -- Ok to use G tube for medication use  -- No BM since [place] per the patient.    -- continue bowel regimen      -- Electrolytes:   Recent Labs   Lab 01/11/22  0343      K 3.9      CO2 26   BUN 21   CREATININE 0.7   MG 1.7      -- Nutrition:   -- replace electrolytes as needed to keep K>4, Mg>2  -- bowel reg - daily miralax, docusate  -- famotidine for GI prophylaxis     ID:   -- Tm: afebrile  -- WBC , trend daily     Heme/Onc:   -- H/H stable  -- past products received: none  Lab Results   Component Value Date    WBC 8.26 01/11/2022    HGB 9.2 (L) 01/11/2022    HCT 27.4 (L) 01/11/2022    MCV 84 01/11/2022     01/11/2022        Endo:   #S/p thyroidectomy   -- Trend ionized Ca2+ q8h  -- continue 1500mg of Ca2+ q8h  -- continue calcitrol   -- continue levothyroxine    #DM2  -- endocrine following,  appreciate recommendations  -- insulin ggt     PPx:   Feeding: Regular diet / NPO strict. TF adv as tolerated  Analgesia/Sedation: none  Thromboembolic prevention:LVX / SCD  HOB 30  Stress Ulcer ppx: famotidine  Glucose control: Critical care goal 140-180 g/dl  Turn q2 hrs     Dispo/Code Status/Palliative:   -- Stepped down to PCU

## 2022-01-11 NOTE — PLAN OF CARE
Recommendations     1. Continue Glucerna 1.5 advancing as tolerated until goal of 50 mL/hr providing pt with 1800 kcal, 99 g protein, and 911 mL free water              B[place]us: Glucerna 1.5 5 cans/day - 1780 kcal, 98 g protein, and 900 mL free water     2. Monitor and follow-up     Goals: meet % EEN/EPN by RD follow-up  Nutrition Goal Status: goal met  Communication of RD Recs:  (POC)

## 2022-01-11 NOTE — PLAN OF CARE
Problem: Physical Therapy Goal  Goal: Physical Therapy Goal  Description: Goals to be met by: 2022     Patient will increase functional independence with mobility by performin. Sit to stand transfer with [place] -not met  2. Gait  x 300 feet with Modified [place] using least restrictive assistive device. -not met  3. Ascend/descend 1 step without hand rail with supervision -not met  Outcome: Ongoing, Progressing   Goals remain appropriate. 2022

## 2022-01-11 NOTE — PLAN OF CARE
"      SICU PLAN OF CARE NOTE    Dx: Larynx neoplasm malignant    Shift Events: Plan of care discussed with patient. Patient denies pain/discomfort. Ambulating x1 assist. Insulin gtt rate increased to 0.3 u/hr per endocrine orders. Tube feedings increased to 40 cc/hr per order. Patient OOBTC for most of shift. Awaiting bed in step down unit.    Goals of Care: MAP >65. SpO2 >90. Flap checks q2h. Accuchecks q4h.    Neuro: AAO x4, Follows Commands, and Moves All Extremities    Vital Signs: BP (!) 141/76 (BP Location: Left arm, Patient Position: Lying)   Pulse 66   Temp 97.7 °F (36.5 °C) (Oral)   Resp 18   Ht 5' 6" (1.676 m)   Wt 66 kg (145 lb 8.1 oz)   SpO2 100%   BMI 23.48 kg/m²     Respiratory: Room Air    Diet: NPO and Tube Feeds    Gtts: Insulin    Urine Output: Voids Spontaneously 650 cc/shift    Drains: GLENN drains x4 to bulb suction with minimal SS output.  PEG with no residuals.     Flap Checks q2h     Labs/Accuchecks: ionized calcium q8h. Accuchecks q4h. Daily labs reviewed.    Skin: no new skin breakdown this shift. Incisions HENRY, staples intact. Waffle mattress in use. Patient repositions independently.      "

## 2022-01-11 NOTE — PT/OT/SLP PROGRESS
Physical Therapy Treatment    Patient Name:  Cyrus aBtres Jr.   MRN:  [id number]    Recommendations:     Discharge Recommendations:   (home no needs)   Discharge Equipment Recommendations: none   Barriers to discharge: None    Assessment:     Cyrus Batres Jr. is a 71 y.o. male admitted with a medical diagnosis of Larynx neoplasm malignant.  He presents with the following impairments/functional limitations:  impaired endurance,impaired functional mobilty,gait instability,impaired balance  Pt tolerated treatment better being able to gait train farther. Pt will benefit from cont skilled PT 3x/wk to progress physically. Pt will be able to discharge home with no needs when medically stable. Pt is s/p neck dissection etc 1/6.    Rehab Prognosis: Good; patient would benefit from acute skilled PT services to address these deficits and reach maximum level of function.    Recent Surgery: Procedure(s) (LRB):  LARYNGECTOMY (N/A)  DISSECTION, NECK (Bilateral)  CREATION, FREE FLAP (Right)  THYROIDECTOMY  LARYNGOSCOPY (N/A)  REIMPLANTATION, PARATHYROID TISSUE (N/A) 5 Days Post-Op    Plan:     During this hospitalization, patient to be seen 3 x/week to address the identified rehab impairments via gait training,therapeutic activities and progress toward the following goals:    · Plan of Care Expires:  02/08/22    Subjective     Chief Complaint: pt c/o slight pain during treatment.   Patient/Family Comments/goals:  To get better and go home.   Pain/Comfort:  · Pain Rating 1: 3/10 (throat)  · Pain Addressed 1: Distraction  · Pain Rating Post-Intervention 1: 3/10 (see above)      Objective:     Communicated with nurse prior to session.  Patient found up in chair with telemetry,blood pressure cuff,pulse ox (continuous),GLENN drain,PEG Tube,peripheral IV upon PT entry to room.     General Precautions: Standard, fall,respiratory   Orthopedic Precautions:N/A   Braces:    Respiratory Status: Room air     Functional Mobility:  · Transfers:      · Sit to Stand:  supervision with no AD  ·   · Gait: pt received gait training ~ 280 ft with SBA, mask on and RN supervising for portable monitor usage. pt pushed rolling IV pole during gait training.       AM-PAC 6 CLICK MOBILITY  Turning over in bed (including adjusting bedclothes, sheets and blankets)?: 3  Sitting down on and standing up from a chair with arms (e.g., wheelchair, bedside commode, etc.): 3  Moving from lying on back to sitting on the side of the bed?: 3  Moving to and from a bed to a chair (including a wheelchair)?: 3  Need to walk in hospital room?: 3  Climbing 3-5 steps with a railing?: 3  Basic Mobility Total Score: 18       Therapeutic Activities and Exercises:   pt received verbal instructions in PT POC and verbally expressed understanding of such.     Patient left up in chair with all lines intact, call button in reach and RN notified..    GOALS:   Multidisciplinary Problems     Physical Therapy Goals        Problem: Physical Therapy Goal    Goal Priority Disciplines Outcome Goal Variances Interventions   Physical Therapy Goal     PT, PT/OT Ongoing, Progressing     Description: Goals to be met by: 2022     Patient will increase functional independence with mobility by performin. Sit to stand transfer with [place] -not met  2. Gait  x 300 feet with Modified [place] using least restrictive assistive device. -not met  3. Ascend/descend 1 step without hand rail with supervision -not met             Multidisciplinary Problems (Resolved)        Problem: Physical Therapy Goal    Goal Priority Disciplines Outcome Goal Variances Interventions   Physical Therapy Goal   (Resolved)     PT, PT/OT Met            Problem: Physical Therapy Goal    Goal Priority Disciplines Outcome Goal Variances Interventions   Physical Therapy Goal   (Resolved)     PT, PT/OT Met                     Time Tracking:     PT Received On: 22  PT Start Time: 1023     PT Stop Time: 1040  PT Total Time  (min): 17 min     Billable Minutes: Gait Training 17 min    Treatment Type: Treatment  PT/PTA: PT     PTA Visit Number: 0     01/11/2022

## 2022-01-11 NOTE — SUBJECTIVE & OBJECTIVE
Interval History: No issues.     Medications:  Continuous Infusions:   insulin regular 1 units/mL infusion orderable (TRANSFER) 0.3 Units/hr (01/11/22 0500)     Scheduled Meds:   acetaminophen  650 mg Per G Tube Q6H    ampicillin-sulbactim (UNASYN) IVPB  3 g Intravenous Q8H    apixaban  5 mg Per G Tube BID    calcitrioL  0.5 mcg Per G Tube BID    calcium carbonate  1,500 mg Per G Tube TID    diltiaZEM  30 mg Per G Tube Q6H    docusate  100 mg Per G Tube BID    famotidine  20 mg Per G Tube BID    levothyroxine  112 mcg Per G Tube Before breakfast    polyethylene glycol  17 g Per G Tube Daily    sennosides 8.8 mg/5 ml  5 mL Per G Tube Daily     PRN Meds:dextrose 50%, dextrose 50%, glucagon (human recombinant), glucose, glucose, insulin aspart U-100, magnesium oxide, magnesium oxide, oxyCODONE     Review of patient's allergies indicates:   Allergen Reactions    Pollen extracts     Lovastatin Rash     Not confirmed but pt skeptical     Objective:     Vital Signs (24h Range):  Temp:  [97.7 °F (36.5 °C)-98.1 °F (36.7 °C)] 98 °F (36.7 °C)  Pulse:  [54-73] 66  Resp:  [13-20] 17  SpO2:  [97 %-100 %] 98 %  BP: (115-141)/(62-76) 132/68       Lines/Drains/Airways     Drain                 Gastrostomy/Enterostomy 12/27/21 1152 Percutaneous endoscopic gastrostomy (PEG) LUQ 14 days         Closed/Suction Drain 01/06/22 Right Thigh Bulb 15 Fr. 5 days         Closed/Suction Drain 01/06/22 Right;Anterior Thigh Bulb 15 Fr. 5 days         Closed/Suction Drain 01/06/22 1521 Right Neck Bulb 15 Fr. 4 days         Closed/Suction Drain 01/06/22 1524 Left Neck Bulb 15 Fr. 4 days          Airway                 Laryngectomy (Do Not Remove) 01/06/22 1655 Laryngectomy tube 4 days          Peripheral Intravenous Line                 Midline Catheter Insertion/Assessment  - Single Lumen 01/02/22 1410 Right basilic vein (medial side of arm) other (see comments) 8 days         Peripheral IV - Single Lumen 01/10/22 0000 20 G  Anterior;Left Forearm 1 day                Physical Exam  Awake, alert, nad  EOMI  Neck - slightly more edematous and tender on right side, no erythema. Left stable.   Flap - good color/turgor, strong triphasic doppler, cap refill appropriate  GLENN neck and leg with ss output  AAOx3, communicates with gestures    Significant Labs:  BMP:   Recent Labs   Lab 01/11/22  0343   *      CO2 26   BUN 21   CREATININE 0.7   CALCIUM 8.4*   MG 1.7     CBC:   Recent Labs   Lab 01/11/22  0343   WBC 8.26   RBC 3.26*   HGB 9.2*   HCT 27.4*      MCV 84   MCH 28.2   MCHC 33.6       Significant Diagnostics:  I have reviewed all pertinent imaging results/findings within the past 24 hours.

## 2022-01-11 NOTE — ASSESSMENT & PLAN NOTE
BG goal 140-180    - Increase transition IV insulin infusion with stepdown parameters. Initial rate starting at 0.4 units/hr. BG trending upwards while on TF.  - Low Dose SQ Insulin Correction Scale.  - BG monitoring every 4 hours while NPO.     ** Please call Endocrine for any BG related issues **  ** Please notify Endocrine for any change and/or advance in diet**    Lab Results   Component Value Date    HGBA1C 5.9 (H) 01/05/2022     Discharge Planning:   TBD. Please notify endocrinology prior to discharge.

## 2022-01-12 DIAGNOSIS — C32.9 LARYNX CANCER: Primary | ICD-10-CM

## 2022-01-12 DIAGNOSIS — Z90.02 S/P LARYNGECTOMY: ICD-10-CM

## 2022-01-12 PROBLEM — T88.4XXA HARD TO INTUBATE: Chronic | Status: RESOLVED | Noted: 2021-12-09 | Resolved: 2022-01-12

## 2022-01-12 PROBLEM — E83.51 HYPOCALCEMIA: Status: ACTIVE | Noted: 2022-01-12

## 2022-01-12 LAB
ANION GAP SERPL CALC-SCNC: 9 MMOL/L (ref 8–16)
BASOPHILS # BLD AUTO: 0.04 K/UL (ref 0–0.2)
BASOPHILS NFR BLD: 0.5 % (ref 0–1.9)
BUN SERPL-MCNC: 22 MG/DL (ref 8–23)
CA-I BLDV-SCNC: 1.34 MMOL/L (ref 1.06–1.42)
CALCIUM SERPL-MCNC: 9.6 MG/DL (ref 8.7–10.5)
CHLORIDE SERPL-SCNC: 102 MMOL/L (ref 95–110)
CO2 SERPL-SCNC: 26 MMOL/L (ref 23–29)
CREAT SERPL-MCNC: 0.8 MG/DL (ref 0.5–1.4)
DIFFERENTIAL METHOD: ABNORMAL
EOSINOPHIL # BLD AUTO: 0.2 K/UL (ref 0–0.5)
EOSINOPHIL NFR BLD: 2.1 % (ref 0–8)
ERYTHROCYTE [DISTWIDTH] IN BLOOD BY AUTOMATED COUNT: 12.7 % (ref 11.5–14.5)
EST. GFR  (AFRICAN AMERICAN): >60 ML/MIN/1.73 M^2
EST. GFR  (NON AFRICAN AMERICAN): >60 ML/MIN/1.73 M^2
GLUCOSE SERPL-MCNC: 148 MG/DL (ref 70–110)
HCT VFR BLD AUTO: 27.7 % (ref 40–54)
HGB BLD-MCNC: 9 G/DL (ref 14–18)
IMM GRANULOCYTES # BLD AUTO: 0.11 K/UL (ref 0–0.04)
IMM GRANULOCYTES NFR BLD AUTO: 1.3 % (ref 0–0.5)
LYMPHOCYTES # BLD AUTO: 0.6 K/UL (ref 1–4.8)
LYMPHOCYTES NFR BLD: 7.4 % (ref 18–48)
MAGNESIUM SERPL-MCNC: 1.9 MG/DL (ref 1.6–2.6)
MCH RBC QN AUTO: 27.6 PG (ref 27–31)
MCHC RBC AUTO-ENTMCNC: 32.5 G/DL (ref 32–36)
MCV RBC AUTO: 85 FL (ref 82–98)
MONOCYTES # BLD AUTO: 0.5 K/UL (ref 0.3–1)
MONOCYTES NFR BLD: 5.4 % (ref 4–15)
NEUTROPHILS # BLD AUTO: 7.3 K/UL (ref 1.8–7.7)
NEUTROPHILS NFR BLD: 83.3 % (ref 38–73)
NRBC BLD-RTO: 0 /100 WBC
PHOSPHATE SERPL-MCNC: 4.8 MG/DL (ref 2.7–4.5)
PLATELET # BLD AUTO: 453 K/UL (ref 150–450)
PMV BLD AUTO: 9.4 FL (ref 9.2–12.9)
POCT GLUCOSE: 148 MG/DL (ref 70–110)
POCT GLUCOSE: 151 MG/DL (ref 70–110)
POCT GLUCOSE: 151 MG/DL (ref 70–110)
POCT GLUCOSE: 154 MG/DL (ref 70–110)
POCT GLUCOSE: 155 MG/DL (ref 70–110)
POCT GLUCOSE: 168 MG/DL (ref 70–110)
POTASSIUM SERPL-SCNC: 4.3 MMOL/L (ref 3.5–5.1)
RBC # BLD AUTO: 3.26 M/UL (ref 4.6–6.2)
SODIUM SERPL-SCNC: 137 MMOL/L (ref 136–145)
WBC # BLD AUTO: 8.69 K/UL (ref 3.9–12.7)

## 2022-01-12 PROCEDURE — 97535 SELF CARE MNGMENT TRAINING: CPT | Mod: HCNC

## 2022-01-12 PROCEDURE — 63600175 PHARM REV CODE 636 W HCPCS: Mod: HCNC | Performed by: STUDENT IN AN ORGANIZED HEALTH CARE EDUCATION/TRAINING PROGRAM

## 2022-01-12 PROCEDURE — 84100 ASSAY OF PHOSPHORUS: CPT | Mod: HCNC | Performed by: STUDENT IN AN ORGANIZED HEALTH CARE EDUCATION/TRAINING PROGRAM

## 2022-01-12 PROCEDURE — 20600001 HC STEP DOWN PRIVATE ROOM: Mod: HCNC

## 2022-01-12 PROCEDURE — 92507 TX SP LANG VOICE COMM INDIV: CPT | Mod: HCNC

## 2022-01-12 PROCEDURE — 80048 BASIC METABOLIC PNL TOTAL CA: CPT | Mod: HCNC | Performed by: STUDENT IN AN ORGANIZED HEALTH CARE EDUCATION/TRAINING PROGRAM

## 2022-01-12 PROCEDURE — 82330 ASSAY OF CALCIUM: CPT | Mod: HCNC | Performed by: STUDENT IN AN ORGANIZED HEALTH CARE EDUCATION/TRAINING PROGRAM

## 2022-01-12 PROCEDURE — 25000003 PHARM REV CODE 250: Mod: HCNC | Performed by: OTOLARYNGOLOGY

## 2022-01-12 PROCEDURE — 85025 COMPLETE CBC W/AUTO DIFF WBC: CPT | Mod: HCNC | Performed by: STUDENT IN AN ORGANIZED HEALTH CARE EDUCATION/TRAINING PROGRAM

## 2022-01-12 PROCEDURE — 99232 PR SUBSEQUENT HOSPITAL CARE,LEVL II: ICD-10-PCS | Mod: HCNC,,, | Performed by: NURSE PRACTITIONER

## 2022-01-12 PROCEDURE — 25000003 PHARM REV CODE 250: Mod: HCNC | Performed by: STUDENT IN AN ORGANIZED HEALTH CARE EDUCATION/TRAINING PROGRAM

## 2022-01-12 PROCEDURE — 99900035 HC TECH TIME PER 15 MIN (STAT): Mod: HCNC

## 2022-01-12 PROCEDURE — 63600175 PHARM REV CODE 636 W HCPCS: Mod: HCNC | Performed by: NURSE PRACTITIONER

## 2022-01-12 PROCEDURE — 82330 ASSAY OF CALCIUM: CPT | Mod: 91,HCNC | Performed by: STUDENT IN AN ORGANIZED HEALTH CARE EDUCATION/TRAINING PROGRAM

## 2022-01-12 PROCEDURE — 63700000 PHARM REV CODE 250 ALT 637 W/O HCPCS: Mod: HCNC | Performed by: STUDENT IN AN ORGANIZED HEALTH CARE EDUCATION/TRAINING PROGRAM

## 2022-01-12 PROCEDURE — 99900026 HC AIRWAY MAINTENANCE (STAT): Mod: HCNC

## 2022-01-12 PROCEDURE — 83735 ASSAY OF MAGNESIUM: CPT | Mod: HCNC | Performed by: STUDENT IN AN ORGANIZED HEALTH CARE EDUCATION/TRAINING PROGRAM

## 2022-01-12 PROCEDURE — 99232 SBSQ HOSP IP/OBS MODERATE 35: CPT | Mod: HCNC,,, | Performed by: NURSE PRACTITIONER

## 2022-01-12 PROCEDURE — 94761 N-INVAS EAR/PLS OXIMETRY MLT: CPT | Mod: HCNC

## 2022-01-12 RX ORDER — DEXTROSE MONOHYDRATE 100 MG/ML
INJECTION, SOLUTION INTRAVENOUS CONTINUOUS PRN
Status: DISCONTINUED | OUTPATIENT
Start: 2022-01-12 | End: 2022-01-14

## 2022-01-12 RX ORDER — INSULIN ASPART 100 [IU]/ML
1-10 INJECTION, SOLUTION INTRAVENOUS; SUBCUTANEOUS EVERY 4 HOURS PRN
Status: DISCONTINUED | OUTPATIENT
Start: 2022-01-12 | End: 2022-01-14

## 2022-01-12 RX ORDER — GLUCAGON 1 MG
1 KIT INJECTION
Status: DISCONTINUED | OUTPATIENT
Start: 2022-01-12 | End: 2022-01-14

## 2022-01-12 RX ADMIN — CALCIUM CARBONATE 1500 MG: 1250 SUSPENSION ORAL at 09:01

## 2022-01-12 RX ADMIN — CALCITRIOL 0.5 MCG: 1 SOLUTION ORAL at 01:01

## 2022-01-12 RX ADMIN — DILTIAZEM HYDROCHLORIDE 30 MG: 30 TABLET, FILM COATED ORAL at 11:01

## 2022-01-12 RX ADMIN — LEVOTHYROXINE SODIUM 112 MCG: 112 TABLET ORAL at 06:01

## 2022-01-12 RX ADMIN — AMPICILLIN SODIUM AND SULBACTAM SODIUM 3 G: 2; 1 INJECTION, POWDER, FOR SOLUTION INTRAMUSCULAR; INTRAVENOUS at 03:01

## 2022-01-12 RX ADMIN — ACETAMINOPHEN 650 MG: 160 SOLUTION ORAL at 06:01

## 2022-01-12 RX ADMIN — AMPICILLIN SODIUM AND SULBACTAM SODIUM 3 G: 2; 1 INJECTION, POWDER, FOR SOLUTION INTRAMUSCULAR; INTRAVENOUS at 11:01

## 2022-01-12 RX ADMIN — ACETAMINOPHEN 650 MG: 160 SOLUTION ORAL at 12:01

## 2022-01-12 RX ADMIN — ACETAMINOPHEN 650 MG: 160 SOLUTION ORAL at 11:01

## 2022-01-12 RX ADMIN — DILTIAZEM HYDROCHLORIDE 30 MG: 30 TABLET, FILM COATED ORAL at 05:01

## 2022-01-12 RX ADMIN — INSULIN ASPART 1 UNITS: 100 INJECTION, SOLUTION INTRAVENOUS; SUBCUTANEOUS at 08:01

## 2022-01-12 RX ADMIN — APIXABAN 5 MG: 5 TABLET, FILM COATED ORAL at 08:01

## 2022-01-12 RX ADMIN — INSULIN ASPART 2 UNITS: 100 INJECTION, SOLUTION INTRAVENOUS; SUBCUTANEOUS at 04:01

## 2022-01-12 RX ADMIN — ACETAMINOPHEN 650 MG: 160 SOLUTION ORAL at 05:01

## 2022-01-12 RX ADMIN — DILTIAZEM HYDROCHLORIDE 30 MG: 30 TABLET, FILM COATED ORAL at 06:01

## 2022-01-12 RX ADMIN — FAMOTIDINE 20 MG: 20 TABLET ORAL at 08:01

## 2022-01-12 RX ADMIN — CALCITRIOL 0.5 MCG: 1 SOLUTION ORAL at 12:01

## 2022-01-12 RX ADMIN — INSULIN ASPART 2 UNITS: 100 INJECTION, SOLUTION INTRAVENOUS; SUBCUTANEOUS at 12:01

## 2022-01-12 RX ADMIN — APIXABAN 5 MG: 5 TABLET, FILM COATED ORAL at 09:01

## 2022-01-12 RX ADMIN — CALCIUM CARBONATE 1500 MG: 1250 SUSPENSION ORAL at 03:01

## 2022-01-12 RX ADMIN — FAMOTIDINE 20 MG: 20 TABLET ORAL at 09:01

## 2022-01-12 RX ADMIN — CALCIUM CARBONATE 1500 MG: 1250 SUSPENSION ORAL at 08:01

## 2022-01-12 RX ADMIN — DILTIAZEM HYDROCHLORIDE 30 MG: 30 TABLET, FILM COATED ORAL at 12:01

## 2022-01-12 RX ADMIN — CALCITRIOL 0.5 MCG: 1 SOLUTION ORAL at 08:01

## 2022-01-12 RX ADMIN — AMPICILLIN SODIUM AND SULBACTAM SODIUM 3 G: 2; 1 INJECTION, POWDER, FOR SOLUTION INTRAMUSCULAR; INTRAVENOUS at 07:01

## 2022-01-12 NOTE — PROGRESS NOTES
[facility]  Otorhinolaryngology-Head & Neck Surgery  Progress Note    Subjective:     Post-Op Info:  Procedure(s) (LRB):  LARYNGECTOMY (N/A)  DISSECTION, NECK (Bilateral)  CREATION, FREE FLAP (Right)  THYROIDECTOMY  LARYNGOSCOPY (N/A)  REIMPLANTATION, PARATHYROID TISSUE (N/A)   6 Days Post-Op  Hospital Day: 19     Interval History: No issues.     Medications:  Continuous Infusions:   insulin regular 1 units/mL infusion orderable (TRANSFER) 0.3 Units/hr (01/11/22 0500)     Scheduled Meds:   acetaminophen  650 mg Per G Tube Q6H    ampicillin-sulbactim (UNASYN) IVPB  3 g Intravenous Q8H    apixaban  5 mg Per G Tube BID    calcitrioL  0.5 mcg Per G Tube BID    calcium carbonate  1,500 mg Per G Tube TID    diltiaZEM  30 mg Per G Tube Q6H    docusate  100 mg Per G Tube BID    famotidine  20 mg Per G Tube BID    levothyroxine  112 mcg Per G Tube Before breakfast    polyethylene glycol  17 g Per G Tube Daily    sennosides 8.8 mg/5 ml  5 mL Per G Tube Daily     PRN Meds:dextrose 50%, dextrose 50%, glucagon (human recombinant), glucose, glucose, insulin aspart U-100, magnesium oxide, magnesium oxide, oxyCODONE     Review of patient's allergies indicates:   Allergen Reactions    Pollen extracts     Lovastatin Rash     Not confirmed but pt skeptical     Objective:     Vital Signs (24h Range):  Temp:  [97.7 °F (36.5 °C)-98.1 °F (36.7 °C)] 98 °F (36.7 °C)  Pulse:  [54-73] 66  Resp:  [13-20] 17  SpO2:  [97 %-100 %] 98 %  BP: (115-141)/(62-76) 132/68       Lines/Drains/Airways     Drain                 Gastrostomy/Enterostomy 12/27/21 1152 Percutaneous endoscopic gastrostomy (PEG) LUQ 14 days         Closed/Suction Drain 01/06/22 Right Thigh Bulb 15 Fr. 5 days         Closed/Suction Drain 01/06/22 Right;Anterior Thigh Bulb 15 Fr. 5 days         Closed/Suction Drain 01/06/22 1521 Right Neck Bulb 15 Fr. 4 days         Closed/Suction Drain 01/06/22 1524 Left Neck Bulb 15 Fr. 4 days          Airway                  "Laryngectomy (Do Not Remove) 01/06/22 1655 Laryngectomy tube 4 days          Peripheral Intravenous Line                 Midline Catheter Insertion/Assessment  - Single Lumen 01/02/22 1410 Right basilic vein (medial side of arm) other (see comments) 8 days         Peripheral IV - Single Lumen 01/10/22 0000 20 G Anterior;Left Forearm 1 day                Physical Exam  Awake, alert, nad  EOMI  Neck - slightly more edematous and tender on right side, no erythema. Left stable.   Flap - good color/turgor, strong triphasic doppler, cap refill appropriate  GLENN neck and leg with ss output  AAOx3, communicates with gestures    Significant Labs:  BMP:   Recent Labs   Lab 01/11/22  0343   *      CO2 26   BUN 21   CREATININE 0.7   CALCIUM 8.4*   MG 1.7     CBC:   Recent Labs   Lab 01/11/22  0343   WBC 8.26   RBC 3.26*   HGB 9.2*   HCT 27.4*      MCV 84   MCH 28.2   MCHC 33.6       Significant Diagnostics:  I have reviewed all pertinent imaging results/findings within the past 24 hours.    Assessment/Plan:     * Larynx neoplasm malignant  - Ok for stepdwon to PCU  Continue q4h flap checks: Record Doppler Pulses (artery and vein) ,Capillary Refill , Color, Turgor, temperature.   - Neurovascular Checks q4h of bilateral upper and lower extremities   - Keep head elevated and in neutral position  - Sign above bed that reads "No circumferential Neck Ties"   - FOR ANY FLAP ISSUES, PLEASE PHONE ENT RESIDENT ON CALL.  - Please label drains carefully and document accordingly  - Maintain xeroform in left ear canal  - Bacitracin twice daily to incision line     Neuro:  - Alert, oriented. Pain medication PRN.      CV:  - HDS. SBP > 90. A-line can be removed  - continue with q1h flap checks     Pulm:  - stable     GI/FEN/Lytes:  - Strict NPO  - monitor calcium closely and replete prn  - start trickle TFs today  - replace lytes prn     Renal:  - UOP adequate. Juarez out.      Heme/ID:  - cont to trend HH  - cont Unasyn   "   Endo:  - Q6 acuchecks, SSI.      PPX:  PT/OT  L40  PPI     Dispo:   Continue hospital care. Poss dc end of this week                        Yo Herrera MD  Otorhinolaryngology-Head & Neck Surgery  [facility]

## 2022-01-12 NOTE — PLAN OF CARE
POC reviewed with patient who verbalized understanding. VSS on RA. AAOX4. Remains free of falls and injury. Pt up to chair and ambulate in room.     - R leg graft site w/staples  - neck incision w/staples  - R and L neck GLENN, 2 x GLENN R thigh  - flap checks q2  - G tube, tube feeds per order, tolerating well  - R midline catheter, unit draw, q8 Calcium ionized levels  - insulin drip d/c, Blood glucose being monitored every 4 hours with coverage needed    NPO, denies nausea. Pain controlled with scheduled medications per MAR. Telemetry being monitored running NSR. Patient denies chest pain & SOB. No acute events. No distress noted. Bed in lowest position, call light within reach, frequent rounds made for safety.     [facility]     Problem: Adult Inpatient Plan of Care  Goal: Readiness for Transition of Care  Outcome: Ongoing, Progressing     Problem: Fall Injury Risk  Goal: Absence of Fall and Fall-Related Injury  Outcome: Ongoing, Progressing     Problem: Skin Injury Risk Increased  Goal: Skin Health and Integrity  Outcome: Ongoing, Progressing     Problem: Infection  Goal: Absence of Infection Signs and Symptoms  Outcome: Ongoing, Progressing

## 2022-01-12 NOTE — PT/OT/SLP PROGRESS
[facility] - Surgical Intensive Care  Total Laryngectomy Treatment Note    Patient Name:  Cyrus Batres Jr.   MRN:  [id number]  Admitting Diagnosis: Larynx neoplasm malignant    PATIENT IS A NECK-BREATHER - DO NOT ORALLY INTUBATE    Recommendations:                 General Recommendations:  post-op education  Diet recommendations:  Patient NPO until cleared by ENT  General Precautions: Standard, fall,respiratory    Communication:      Communication strategies: Eyeglasses, Provide increased time to answer, Go to room if call light pushed, Encourage use of communication device (AL) and Whiteboard/Paper-pencil provided   Patient communicating all wants and needs? yes   Supplies labeled with patient information?  yes   RN notified if supplies brought to patient's room?  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       Past Surgical History:   Procedure Laterality Date    DIRECT LARYNGOBRONCHOSCOPY N/A 12/27/2021    Procedure: LARYNGOSCOPY, DIRECT, WITH BRONCHOSCOPY;  Surgeon: Flex Espinosa MD;  Location: [facility] OR [address] [place] [address];  Service: ENT;  Laterality: N/A;    DISSECTION OF NECK Bilateral 1/6/2022    Procedure: DISSECTION, NECK;  Surgeon: Jesse James MD;  Location: [address];  Service: ENT;  Laterality: Bilateral;    FLAP PROCEDURE Right 1/6/2022    Procedure: CREATION, FREE FLAP;  Surgeon: Alise Hart MD;  Location: [facility] OR [address] [place] [address];  Service: ENT;  Laterality: Right;  Ischemic start 1351  Ischemic stop 1502    LARYNGECTOMY N/A 1/6/2022    Procedure: LARYNGECTOMY;  Surgeon: Jesse James MD;  Location: [facility] OR [address] [place] [address];  Service: ENT;  Laterality: N/A;    LARYNGOSCOPY N/A 8/4/2020    Procedure: Suspension microlaryngoscopy with biopsy, possible KTP laser treatment/excision;  Surgeon: Stew Noel MD;   Location: [facility] OR [facility];  Service: ENT;  Laterality: N/A;  Microscope, telescopes, tower, microinstruments, KTP laser, rep conf# [id number] IC 7/28.    LARYNGOSCOPY N/A 3/16/2021    Procedure: Suspension microlaryngoscopy with excision of lesion, possible CO2 laser;  Surgeon: Stew Noel MD;  [facility];  Service: ENT;  Laterality: N/A;  Microscope, telescopes, tower, microinstruments, CO2 laser, rep conf# [id number] IC 3/4.    LARYNGOSCOPY N/A 4/1/2021    Procedure: Suspension microlaryngoscopy with KTP laser excision of lesion;  Surgeon: Stew Noel MD;  [facility];  Service: ENT;  Laterality: N/A;  Microscope, telescopes, tower, microinstruments, 70 degree scope, vocal fold , KTP laser, rep conf# [id number] BC    LARYNGOSCOPY N/A 12/9/2021    Procedure: Suspension microlaryngoscopy with biopsy;  Surgeon: Stew Noel MD;  [facility];  Service: ENT;  Laterality: N/A;  Microscope, telescopes, tower, microinstruments    LARYNGOSCOPY N/A 1/6/2022    Procedure: LARYNGOSCOPY;  Surgeon: Jesse James MD;  [facility];  Service: ENT;  Laterality: N/A;    MICROLARYNGOSCOPY N/A 3/17/2020    Procedure: MICROLARYNGOSCOPY;  Surgeon: Jung Xiao MD;  Location: [facility] OR;  Service: ENT;  Laterality: N/A;  Laser Microlaryngoscopy  NEED TO SCHEDULE LASER from [employer] [id number]    REIMPLANTATION OF PARATHYROID TISSUE N/A 1/6/2022    Procedure: REIMPLANTATION, PARATHYROID TISSUE;  Surgeon: Jesse James MD;  [facility];  Service: ENT;  Laterality: N/A;    THYROIDECTOMY  1/6/2022    Procedure: THYROIDECTOMY;  Surgeon: Jesse James MD;  [facility];  Service: ENT;;    TRACHEOSTOMY N/A 12/27/2021    Procedure: CREATION, TRACHEOSTOMY;  Surgeon: Flex Espinosa MD;  Location: [facility] OR [address] [place] [address];  Service: ENT;  Laterality: N/A;       Date of Surgery: 1/6/22    The patient received a  "total laryngectomy with bilateral neck dissection.  PEG ?  yes  TEP placed at time of surgery?  no    Subjective:     Awake/alert    Pain/Comfort:  Pain Rating 1: 0/10  Pain Rating Post-Intervention 1: 0/10  Location 2: abdomen  Pain Addressed 2: Reposition,Distraction    Respiratory Status: Room air    Objective:     Communication:  · Gestures/Facial Expressions  · Mouthing Words  · Artificial Larynx Trials:  Yes  Placement:  Able to achieve independently  · Timing:  Using appropriately with short phrases  · Intelligibility:  Phrase 90%    Additional Treatment:  Pt repositioned upright in bed for ongoing education. Mp tube with HME in place throughout session. Pt walked to bathroom with SLPand completed stoma care with min cues. Pt placed clean mp tube with fresh HME back into stoma.  Pt recalled information about post-op kit with 100% accy ind'ly. He utilized AL throughout session. He completed stoma care     Education:     General Post-Operative Education Provided:  · SLP discussed:   Anatomical changes that will occur in respiration, communication, coughing, sneezing, blowing nose, and swallowing   · Timelines of swallowing assessments and progression of textures were detailed  · Patient will be a total neck breather and that in the event of emergency, oxygen must be provided to stoma   · Patient advised to contact local fire department to update emergency personnel that patient will be a "total neck breather"   · Educated on stoma care and how to perform   · Use and benefits of heat and moisture exchanges (HMEs) to provide humidity to stoma   · Basic surgical course and recovery  · Expectations for follow up and discharge   · Lifestyle changes, including importance of avoiding water to stoma provided.  · SLP demonstrated use of an artificial larynx (AL) on neck and external cheek. Discussed AL speech with an oral adaptor.  · Patient engaged in education and demonstrating adequate understanding of all topics " reviewed.    Alternate Airway Precautions in Place:  · All laryngectomy precautions present: Orange signs above HOB and on door, communication impaired wristband, Pediatric ambu bag mask (size 3)  ·  notified patient has laryngectomy and to call RN immediately when patient presses call bell.  · RN notified to enter room immediately when called to attend patient.    Impressions:  · Patient would benefit from ongoing education/support of  Post-operative anatomy, physical and functional limitations  · Permanency of thacheostoma  · Contents of post-laryngectomy kit  · The ability to perform Stoma care.    Goals:   Multidisciplinary Problems     SLP Goals        Problem: SLP Goal    Goal Priority Disciplines Outcome   SLP Goal     SLP Ongoing, Progressing   Description: Speech Language Pathology Goals  Goals expected to be met by 1/15    1. Pt/family will actively participate in post-laryngectomy education to facilitate [place] and desensitization  2. Pt will communicate contextualized phrases via AL with approximately 60% intelligibility provided min cues to improve communication.  3. Pt/family will perform stoma care x1 per session provided min cues to facilitate [place].                           Plan:     · Patient to be seen:  5 x/week   · Plan of Care expires:  02/06/22  · Plan of Care reviewed with:  patient   · SLP Follow-Up:  Yes       Discharge recommendations:  home       Time Tracking:     SLP Treatment Date:   01/12/22  Speech Start Time:  1102  Speech Stop Time:  1116     Speech Total Time (min):  14 min    Billable Minutes: Speech Therapy Individual 6 and Self Care/Home Management Training 8  01/12/2022

## 2022-01-12 NOTE — ASSESSMENT & PLAN NOTE
Doing well. Laryngectomy tube in place with HME. Patient is proficient in suctioning and stoma care.  Planning to increase tube feeds to bolus and discharge at end of week.  Home tube feeds and suction supplies ordered.  Will follow up outpatient with speech therapy.

## 2022-01-12 NOTE — PLAN OF CARE
Orders for home tube feeds:    Bolus Glucerna 1.5 5 cans/day via PEG - 1780 kcal, 98 g protein, and 900 mL free water.        JODI Pinon, FNP-C  Otolaryngology- Division of Head and Neck Surgical Oncology  [phone]

## 2022-01-12 NOTE — PHYSICIAN QUERY
PT Name: Cyrus Batres Jr.  MR #: [id number]     Documentation Clarification      CDS/: Ana ABREU,RN              Contact information: [email or web address]    This form is a permanent document in the medical record.     Query Date: January 12, 2022    By submitting this query, we are merely seeking further clarification of documentation. Please utilize your independent clinical judgment when addressing the question(s) below.    The Medical Record reflects the following:    Supporting Clinical Findings Location in Medical Record      Outpatient Neurological Rehabilitation  MODIFIED BARIUM SWALLOW STUDY      Date: 10/26/2021      Name: Cyrus Batres Jr.   MRN: [id number]           Therapy Diagnosis: WFL oral and pharyngeal phases of the swallow     Physician: Stew Noel MD  Physician Orders: SLP Video Swallow  Medical Diagnosis from Referral: Dysphagia, pharyngoesophageal phase       SLP Plan of Care 10/26/21      POSTOPERATIVE DIAGNOSES:   1. Laryngeal SCC   2. Dysphagia.      PROCEDURES PERFORMED:   1. Esophagogastroduodenoscopy  2. Percutaneous endoscopic gastrostomy.      General Surgery Op note 12/27/21                                                                            Provider, please provide diagnosis or diagnoses associated with above clinical findings.    [x   ]  Dysphagia, pharyngoesophageal phase      [   ]  Other phase of dysphagia (please specify): _____________________     [   ]  Other (please specify): ________________     [  ]  Clinically undetermined

## 2022-01-12 NOTE — ASSESSMENT & PLAN NOTE
BG goal 140-180    - Discontinue transition IV insulin infusion with stepdown parameters. Initial rate starting at 0.4 units/hr. BG trending upwards while on TF. Will challenge pancreas in preparation for discharge.   - Increase to Mod Dose SQ Insulin Correction Scale PRN hyperglycemia.   - BG monitoring every 4 hours while NPO.     ** Please call Endocrine for any BG related issues **  ** Please notify Endocrine for any change and/or advance in diet**    Lab Results   Component Value Date    HGBA1C 5.9 (H) 01/05/2022     Discharge Planning:   TBD. Please notify endocrinology prior to discharge.

## 2022-01-12 NOTE — SUBJECTIVE & OBJECTIVE
"Interval HPI:   Overnight events:   BG stable and within goal while on current IV/SQ insulin regimen. Patient has stepped down to GISSU since yesterday. Endo will continue to follow, and manage glycemic control while inpatient.   Diet NPO  6 Days Post-Op    Eating:   NPO  Nausea: No  Hypoglycemia and intervention: No  Fever: No  TPN and/or TF: Yes  If yes, type of TF/TPN and rate: TF at 50 cc/hr.     /69 (BP Location: Left arm, Patient Position: Lying)   Pulse 60   Temp 97.6 °F (36.4 °C) (Axillary)   Resp 17   Ht 5' 6" (1.676 m)   Wt 66 kg (145 lb 8.1 oz)   SpO2 95%   BMI 23.48 kg/m²     Labs Reviewed and Include    Recent Labs   Lab 01/12/22  0450   *   CALCIUM 9.6      K 4.3   CO2 26      BUN 22   CREATININE 0.8     Lab Results   Component Value Date    WBC 8.69 01/12/2022    HGB 9.0 (L) 01/12/2022    HCT 27.7 (L) 01/12/2022    MCV 85 01/12/2022     (H) 01/12/2022     No results for input(s): TSH, FREET4 in the last 168 hours.  Lab Results   Component Value Date    HGBA1C 5.9 (H) 01/05/2022       Nutritional status:   Body mass index is 23.48 kg/m².  Lab Results   Component Value Date    ALBUMIN 1.9 (L) 01/07/2022    ALBUMIN 2.6 (L) 01/06/2022    ALBUMIN 2.8 (L) 01/05/2022     No results found for: PREALBUMIN    Estimated Creatinine Clearance: 77.5 mL/min (based on SCr of 0.8 mg/dL).    Accu-Checks  Recent Labs     01/10/22  2055 01/11/22  0019 01/11/22  0344 01/11/22  0922 01/11/22  1248 01/11/22  1658 01/11/22 2011 01/12/22  0035 01/12/22  0449 01/12/22  0745   POCTGLUCOSE 177* 151* 163* 153* 199* 125* 146* 155* 151* 148*       Current Medications and/or Treatments Impacting Glycemic Control  Immunotherapy:    Immunosuppressants     None        Steroids:   Hormones (From admission, onward)            None        Pressors:    Autonomic Drugs (From admission, onward)            None        Hyperglycemia/Diabetes Medications:   Antihyperglycemics (From admission, onward)     "        Start     Stop Route Frequency Ordered    01/11/22 1445  insulin regular in 0.9 % NaCl 100 unit/100 mL (1 unit/mL) infusion        Question:  Enter initial dose (Units/hr):  Answer:  0.4    -- IV Continuous 01/11/22 1437    01/07/22 1149  insulin aspart U-100 pen 0-5 Units         -- SubQ As needed (PRN) 01/07/22 1046

## 2022-01-12 NOTE — PLAN OF CARE
01/12/22 1248   Post-Acute Status   Post-Acute Authorization HME   HME Status Referrals Sent   SW sent tube feed referral to [facility] for tube feeds.  BOBO sent suction machine orders to [facility] to process.      Diana Fabian LMSW  [facility]  [id number]

## 2022-01-12 NOTE — PROGRESS NOTES
"[facility] - [facility]  Otorhinolaryngology-Head & Neck Surgery  Progress Note    Subjective:     Post-Op Info:  Procedure(s) (LRB):  LARYNGECTOMY (N/A)  DISSECTION, NECK (Bilateral)  CREATION, FREE FLAP (Right)  THYROIDECTOMY  LARYNGOSCOPY (N/A)  REIMPLANTATION, PARATHYROID TISSUE (N/A)   6 Days Post-Op  Hospital Day: 19     No new subjective & objective note has been filed under this hospital service since the last note was generated.    Assessment/Plan:     * Larynx neoplasm malignant  - Ok for stepdwon to PCU  Continue q4h flap checks: Record Doppler Pulses (artery and vein) ,Capillary Refill , Color, Turgor, temperature.   - Neurovascular Checks q4h of bilateral upper and lower extremities   - Keep head elevated and in neutral position  - Sign above bed that reads "No circumferential Neck Ties"   - FOR ANY FLAP ISSUES, PLEASE PHONE ENT RESIDENT ON CALL.  - Please label drains carefully and document accordingly  - Maintain xeroform in left ear canal  - Bacitracin twice daily to incision line     Neuro:  - Alert, oriented. Pain medication PRN.      CV:  - HDS. SBP > 90. A-line can be removed  - continue with q1h flap checks     Pulm:  - stable     GI/FEN/Lytes:  - Strict NPO  - monitor calcium closely and replete prn  - start trickle TFs today  - replace lytes prn     Renal:  - UOP adequate. Juarez out.      Heme/ID:  - cont to trend HH  - cont Unasyn     Endo:  - Q6 acuchecks, SSI.      PPX:  PT/OT  L40  PPI     Dispo:   Continue hospital care. Poss dc end of this week                    Hypocalcemia  Post op PTH low. Calcium now stable on calcitriol and calcium carbonate. Will monitor labs.    S/P laryngectomy  Doing well. Laryngectomy tube in place with HME. Patient is proficient in suctioning and stoma care.  Planning to increase tube feeds to bolus and discharge at end of week.  Home tube feeds and suction supplies ordered.  Will follow up outpatient with speech therapy.     On enteral nutrition  Continue tube " feeds, will advance to bolus prior to discharge.    Type 2 diabetes mellitus with hyperglycemia, without long-term current use of insulin  Sliding scale insulin and accuchecks. Endocrine consulted.    Paroxysmal atrial fibrillation  Vital signs have been stable.   Continue home Diltiazem 30mg q6h,  Restarted home Eliquis.        Fariha Muñoz NP  Otorhinolaryngology-Head & Neck Surgery  [facility]

## 2022-01-12 NOTE — PHYSICIAN QUERY
PT Name: Cyrus Batres Jr.  MR #: [id number]    DOCUMENTATION CLARIFICATION     CDS/: Ana ABREU,RN           Contact information: [email or web address]    This form is a permanent document in the medical record.     Query Date: January 12, 2022    By submitting this query, we are merely seeking further clarification of documentation. Please utilize your independent clinical judgment when addressing the question(s) below.    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     Current Outpatient Medications on File Prior to Encounter  Medication   apixaban (ELIQUIS) 5 mg Tab   aspirin (ECOTRIN) 81 MG EC tablet     Hemoptysis   - Admit to ICU for airway observation  - Nebulized TXA prn for hemoptysis  - Continue to hold home eliquis for now      ENT consult 12/25/21       Provider, please clarify if there is any clinical correlation between Tumor-related bleeding and Eliquis and Aspirin.           Are the conditions:      [  x] Due to or associated with each other   [  ] Unrelated to each other   [  ] Other explanation (Please Specify): ______________   [  ] Clinically Undetermined                                                                               Please document in your progress notes daily for the duration of treatment until resolved and include in your discharge summary.

## 2022-01-12 NOTE — ASSESSMENT & PLAN NOTE
"- Ok for stepdwon to PCU  Continue q4h flap checks: Record Doppler Pulses (artery and vein) ,Capillary Refill , Color, Turgor, temperature.   - Neurovascular Checks q4h of bilateral upper and lower extremities   - Keep head elevated and in neutral position  - Sign above bed that reads "No circumferential Neck Ties"   - FOR ANY FLAP ISSUES, PLEASE PHONE ENT RESIDENT ON CALL.  - Please label drains carefully and document accordingly  - Maintain xeroform in left ear canal  - Bacitracin twice daily to incision line     Neuro:  - Alert, oriented. Pain medication PRN.      CV:  - HDS. SBP > 90. A-line can be removed  - continue with q1h flap checks     Pulm:  - stable     GI/FEN/Lytes:  - Strict NPO  - monitor calcium closely and replete prn  - start trickle TFs today  - replace lytes prn     Renal:  - UOP adequate. Juarez out.      Heme/ID:  - cont to trend HH  - cont Unasyn     Endo:  - Q6 acuchecks, SSI.      PPX:  PT/OT  L40  PPI     Dispo:   Continue hospital care. Poss dc end of this week                  "

## 2022-01-12 NOTE — PROGRESS NOTES
Nael [facility] - [facility]  Endocrinology  Progress Note    Admit Date: 12/25/2021     Reason for Consult: Management of T2DM, Hyperglycemia     Surgical Procedure and Date:   12/27/21  CREATION, TRACHEOSTOMY (N/A)  LARYNGOSCOPY, DIRECT, WITH BRONCHOSCOPY (N/A)  INSERTION, PEG TUBE (N/A)     Diabetes diagnosis year: 2010    Home Diabetes Medications:  Ozempic stopped on 10/18/21 by Dena Silveira as a1c below goal     How often checking glucose at home?  Once daily    BG readings on regimen: 150s  Hypoglycemia on the regimen?  No  Missed doses on regimen?  n/a    Diabetes Complications include:     None     Complicating diabetes co morbidities:   pAfib, HTN, active cancer       HPI:   Patient is a 71 y.o. 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 He presented as a transfer from [place] for ENT evaluation. He initially presented to the ER for worsening hemoptysis. Laryngeal videostroboscopy 11/8/21 notable for webbing across membranous vocal folds, granular changes infraglottically, firbinous debris across anterior commissure, post surgical stiffness of bilateral true vocal folds, phonatory supraglottic hyperfunction, occasional plica ventricularis, thick salivary secretions, pooled in pyriforms, diffuse mild laryngopharyngeal edema.He is now s/p the above procedure. He was last seen by MISSY Gutierrez on 10/18/21 for DM management. Endocrinology consulted for management of T2DM.            Lab Results   Component Value Date    HGBA1C 5.5 10/05/2021           Interval HPI:   Overnight events:   BG stable and within goal while on current IV/SQ insulin regimen. Patient has stepped down to [facility] since yesterday. Endo will continue to follow, and manage glycemic control while inpatient.   Diet NPO  6 Days Post-Op    Eating:   NPO  Nausea: No  Hypoglycemia and intervention: No  Fever: No  TPN and/or TF: Yes  If yes, type of TF/TPN and  "rate: TF at 50 cc/hr.     /69 (BP Location: Left arm, Patient Position: Lying)   Pulse 60   Temp 97.6 °F (36.4 °C) (Axillary)   Resp 17   Ht 5' 6" (1.676 m)   Wt 66 kg (145 lb 8.1 oz)   SpO2 95%   BMI 23.48 kg/m²     Labs Reviewed and Include    Recent Labs   Lab 01/12/22  0450   *   CALCIUM 9.6      K 4.3   CO2 26      BUN 22   CREATININE 0.8     Lab Results   Component Value Date    WBC 8.69 01/12/2022    HGB 9.0 (L) 01/12/2022    HCT 27.7 (L) 01/12/2022    MCV 85 01/12/2022     (H) 01/12/2022     No results for input(s): TSH, FREET4 in the last 168 hours.  Lab Results   Component Value Date    HGBA1C 5.9 (H) 01/05/2022       Nutritional status:   Body mass index is 23.48 kg/m².  Lab Results   Component Value Date    ALBUMIN 1.9 (L) 01/07/2022    ALBUMIN 2.6 (L) 01/06/2022    ALBUMIN 2.8 (L) 01/05/2022     No results found for: PREALBUMIN    Estimated Creatinine Clearance: 77.5 mL/min (based on SCr of 0.8 mg/dL).    Accu-Checks  Recent Labs     01/10/22  2055 01/11/22  0019 01/11/22  0344 01/11/22  0922 01/11/22  1248 01/11/22  1658 01/11/22 2011 01/12/22  0035 01/12/22  0449 01/12/22  0745   POCTGLUCOSE 177* 151* 163* 153* 199* 125* 146* 155* 151* 148*       Current Medications and/or Treatments Impacting Glycemic Control  Immunotherapy:    Immunosuppressants     None        Steroids:   Hormones (From admission, onward)            None        Pressors:    Autonomic Drugs (From admission, onward)            None        Hyperglycemia/Diabetes Medications:   Antihyperglycemics (From admission, onward)            Start     Stop Route Frequency Ordered    01/11/22 1445  insulin regular in 0.9 % NaCl 100 unit/100 mL (1 unit/mL) infusion        Question:  Enter initial dose (Units/hr):  Answer:  0.4    -- IV Continuous 01/11/22 1437    01/07/22 1149  insulin aspart U-100 pen 0-5 Units         -- SubQ As needed (PRN) 01/07/22 1049          ASSESSMENT and PLAN    * Larynx neoplasm " malignant  Managed per primary team  Optimize BG control        Type 2 diabetes mellitus with hyperglycemia, without long-term current use of insulin  BG goal 140-180    - Discontinue transition IV insulin infusion with stepdown parameters. Initial rate starting at 0.4 units/hr. BG trending upwards while on TF. Will challenge pancreas in preparation for discharge.   - Increase to Mod Dose SQ Insulin Correction Scale PRN hyperglycemia.   - BG monitoring every 4 hours while NPO.     ** Please call Endocrine for any BG related issues **  ** Please notify Endocrine for any change and/or advance in diet**    Lab Results   Component Value Date    HGBA1C 5.9 (H) 01/05/2022     Discharge Planning:   TBD. Please notify endocrinology prior to discharge.         Hyperlipidemia  Managed per primary team  Condition may cause insulin resistance         Paroxysmal atrial fibrillation  Managed per primary team  Condition may cause insulin resistance         Adverse effect of adrenal cortical steroids, sequela  On steroid therapy per transplant team; may elevate BG readings        On enteral nutrition  Enteral nutrition may increase blood glucose.  Optimize BG control              Vargas Cortez NP  Endocrinology  [facility]

## 2022-01-13 LAB
ANION GAP SERPL CALC-SCNC: 6 MMOL/L (ref 8–16)
BASOPHILS # BLD AUTO: 0.04 K/UL (ref 0–0.2)
BASOPHILS NFR BLD: 0.5 % (ref 0–1.9)
BUN SERPL-MCNC: 23 MG/DL (ref 8–23)
CA-I BLDV-SCNC: 1.45 MMOL/L (ref 1.06–1.42)
CA-I BLDV-SCNC: 1.54 MMOL/L (ref 1.06–1.42)
CALCIUM SERPL-MCNC: 10.6 MG/DL (ref 8.7–10.5)
CHLORIDE SERPL-SCNC: 101 MMOL/L (ref 95–110)
CO2 SERPL-SCNC: 28 MMOL/L (ref 23–29)
CREAT SERPL-MCNC: 0.8 MG/DL (ref 0.5–1.4)
DIFFERENTIAL METHOD: ABNORMAL
EOSINOPHIL # BLD AUTO: 0.2 K/UL (ref 0–0.5)
EOSINOPHIL NFR BLD: 2.1 % (ref 0–8)
ERYTHROCYTE [DISTWIDTH] IN BLOOD BY AUTOMATED COUNT: 12.7 % (ref 11.5–14.5)
EST. GFR  (AFRICAN AMERICAN): >60 ML/MIN/1.73 M^2
EST. GFR  (NON AFRICAN AMERICAN): >60 ML/MIN/1.73 M^2
GLUCOSE SERPL-MCNC: 159 MG/DL (ref 70–110)
HCT VFR BLD AUTO: 27 % (ref 40–54)
HGB BLD-MCNC: 8.8 G/DL (ref 14–18)
IMM GRANULOCYTES # BLD AUTO: 0.13 K/UL (ref 0–0.04)
IMM GRANULOCYTES NFR BLD AUTO: 1.5 % (ref 0–0.5)
LYMPHOCYTES # BLD AUTO: 0.5 K/UL (ref 1–4.8)
LYMPHOCYTES NFR BLD: 5.9 % (ref 18–48)
MAGNESIUM SERPL-MCNC: 1.8 MG/DL (ref 1.6–2.6)
MCH RBC QN AUTO: 27.8 PG (ref 27–31)
MCHC RBC AUTO-ENTMCNC: 32.6 G/DL (ref 32–36)
MCV RBC AUTO: 85 FL (ref 82–98)
MONOCYTES # BLD AUTO: 0.4 K/UL (ref 0.3–1)
MONOCYTES NFR BLD: 5.1 % (ref 4–15)
NEUTROPHILS # BLD AUTO: 7.2 K/UL (ref 1.8–7.7)
NEUTROPHILS NFR BLD: 84.9 % (ref 38–73)
NRBC BLD-RTO: 0 /100 WBC
PHOSPHATE SERPL-MCNC: 4.9 MG/DL (ref 2.7–4.5)
PLATELET # BLD AUTO: 430 K/UL (ref 150–450)
PMV BLD AUTO: 9.6 FL (ref 9.2–12.9)
POCT GLUCOSE: 129 MG/DL (ref 70–110)
POCT GLUCOSE: 151 MG/DL (ref 70–110)
POCT GLUCOSE: 156 MG/DL (ref 70–110)
POCT GLUCOSE: 171 MG/DL (ref 70–110)
POCT GLUCOSE: 184 MG/DL (ref 70–110)
POCT GLUCOSE: 186 MG/DL (ref 70–110)
POTASSIUM SERPL-SCNC: 4.3 MMOL/L (ref 3.5–5.1)
RBC # BLD AUTO: 3.17 M/UL (ref 4.6–6.2)
SODIUM SERPL-SCNC: 135 MMOL/L (ref 136–145)
WBC # BLD AUTO: 8.51 K/UL (ref 3.9–12.7)

## 2022-01-13 PROCEDURE — 97530 THERAPEUTIC ACTIVITIES: CPT | Mod: HCNC,CQ

## 2022-01-13 PROCEDURE — 99232 SBSQ HOSP IP/OBS MODERATE 35: CPT | Mod: HCNC,,, | Performed by: NURSE PRACTITIONER

## 2022-01-13 PROCEDURE — 80048 BASIC METABOLIC PNL TOTAL CA: CPT | Mod: HCNC | Performed by: STUDENT IN AN ORGANIZED HEALTH CARE EDUCATION/TRAINING PROGRAM

## 2022-01-13 PROCEDURE — 27000221 HC OXYGEN, UP TO 24 HOURS: Mod: HCNC

## 2022-01-13 PROCEDURE — 63700000 PHARM REV CODE 250 ALT 637 W/O HCPCS: Mod: HCNC | Performed by: STUDENT IN AN ORGANIZED HEALTH CARE EDUCATION/TRAINING PROGRAM

## 2022-01-13 PROCEDURE — 99900026 HC AIRWAY MAINTENANCE (STAT): Mod: HCNC

## 2022-01-13 PROCEDURE — 94761 N-INVAS EAR/PLS OXIMETRY MLT: CPT | Mod: HCNC

## 2022-01-13 PROCEDURE — 82330 ASSAY OF CALCIUM: CPT | Mod: 91,HCNC | Performed by: STUDENT IN AN ORGANIZED HEALTH CARE EDUCATION/TRAINING PROGRAM

## 2022-01-13 PROCEDURE — 63600175 PHARM REV CODE 636 W HCPCS: Mod: HCNC | Performed by: STUDENT IN AN ORGANIZED HEALTH CARE EDUCATION/TRAINING PROGRAM

## 2022-01-13 PROCEDURE — 85025 COMPLETE CBC W/AUTO DIFF WBC: CPT | Mod: HCNC | Performed by: STUDENT IN AN ORGANIZED HEALTH CARE EDUCATION/TRAINING PROGRAM

## 2022-01-13 PROCEDURE — 20600001 HC STEP DOWN PRIVATE ROOM: Mod: HCNC

## 2022-01-13 PROCEDURE — 25000003 PHARM REV CODE 250: Mod: HCNC | Performed by: OTOLARYNGOLOGY

## 2022-01-13 PROCEDURE — 82330 ASSAY OF CALCIUM: CPT | Mod: HCNC | Performed by: STUDENT IN AN ORGANIZED HEALTH CARE EDUCATION/TRAINING PROGRAM

## 2022-01-13 PROCEDURE — 92507 TX SP LANG VOICE COMM INDIV: CPT | Mod: HCNC

## 2022-01-13 PROCEDURE — 25000003 PHARM REV CODE 250: Mod: HCNC | Performed by: STUDENT IN AN ORGANIZED HEALTH CARE EDUCATION/TRAINING PROGRAM

## 2022-01-13 PROCEDURE — 84100 ASSAY OF PHOSPHORUS: CPT | Mod: HCNC | Performed by: STUDENT IN AN ORGANIZED HEALTH CARE EDUCATION/TRAINING PROGRAM

## 2022-01-13 PROCEDURE — 99900035 HC TECH TIME PER 15 MIN (STAT): Mod: HCNC

## 2022-01-13 PROCEDURE — 97535 SELF CARE MNGMENT TRAINING: CPT | Mod: HCNC

## 2022-01-13 PROCEDURE — 97116 GAIT TRAINING THERAPY: CPT | Mod: HCNC,CQ

## 2022-01-13 PROCEDURE — 99232 PR SUBSEQUENT HOSPITAL CARE,LEVL II: ICD-10-PCS | Mod: HCNC,,, | Performed by: NURSE PRACTITIONER

## 2022-01-13 PROCEDURE — 83735 ASSAY OF MAGNESIUM: CPT | Mod: HCNC | Performed by: STUDENT IN AN ORGANIZED HEALTH CARE EDUCATION/TRAINING PROGRAM

## 2022-01-13 RX ADMIN — CALCIUM CARBONATE 1500 MG: 1250 SUSPENSION ORAL at 02:01

## 2022-01-13 RX ADMIN — CALCITRIOL 0.5 MCG: 1 SOLUTION ORAL at 10:01

## 2022-01-13 RX ADMIN — LEVOTHYROXINE SODIUM 112 MCG: 112 TABLET ORAL at 05:01

## 2022-01-13 RX ADMIN — CALCIUM CARBONATE 1500 MG: 1250 SUSPENSION ORAL at 09:01

## 2022-01-13 RX ADMIN — FAMOTIDINE 20 MG: 20 TABLET ORAL at 09:01

## 2022-01-13 RX ADMIN — AMPICILLIN SODIUM AND SULBACTAM SODIUM 3 G: 2; 1 INJECTION, POWDER, FOR SOLUTION INTRAMUSCULAR; INTRAVENOUS at 02:01

## 2022-01-13 RX ADMIN — DILTIAZEM HYDROCHLORIDE 30 MG: 30 TABLET, FILM COATED ORAL at 05:01

## 2022-01-13 RX ADMIN — APIXABAN 5 MG: 5 TABLET, FILM COATED ORAL at 09:01

## 2022-01-13 RX ADMIN — INSULIN ASPART 2 UNITS: 100 INJECTION, SOLUTION INTRAVENOUS; SUBCUTANEOUS at 04:01

## 2022-01-13 RX ADMIN — AMPICILLIN SODIUM AND SULBACTAM SODIUM 3 G: 2; 1 INJECTION, POWDER, FOR SOLUTION INTRAMUSCULAR; INTRAVENOUS at 06:01

## 2022-01-13 RX ADMIN — INSULIN ASPART 2 UNITS: 100 INJECTION, SOLUTION INTRAVENOUS; SUBCUTANEOUS at 02:01

## 2022-01-13 RX ADMIN — ACETAMINOPHEN 650 MG: 160 SOLUTION ORAL at 05:01

## 2022-01-13 RX ADMIN — INSULIN ASPART 1 UNITS: 100 INJECTION, SOLUTION INTRAVENOUS; SUBCUTANEOUS at 12:01

## 2022-01-13 RX ADMIN — ACETAMINOPHEN 650 MG: 160 SOLUTION ORAL at 02:01

## 2022-01-13 RX ADMIN — DILTIAZEM HYDROCHLORIDE 30 MG: 30 TABLET, FILM COATED ORAL at 02:01

## 2022-01-13 RX ADMIN — AMPICILLIN SODIUM AND SULBACTAM SODIUM 3 G: 2; 1 INJECTION, POWDER, FOR SOLUTION INTRAMUSCULAR; INTRAVENOUS at 11:01

## 2022-01-13 RX ADMIN — INSULIN ASPART 2 UNITS: 100 INJECTION, SOLUTION INTRAVENOUS; SUBCUTANEOUS at 09:01

## 2022-01-13 RX ADMIN — INSULIN ASPART 2 UNITS: 100 INJECTION, SOLUTION INTRAVENOUS; SUBCUTANEOUS at 06:01

## 2022-01-13 NOTE — PT/OT/SLP PROGRESS
Physical Therapy Treatment    Patient Name:  Cyrus Batres Jr.   MRN:  [id number]  Admitting Diagnosis: Larynx neoplasm malignant  Recent Surgery: Procedure(s) (LRB):  LARYNGECTOMY (N/A)  DISSECTION, NECK (Bilateral)  CREATION, FREE FLAP (Right)  THYROIDECTOMY  LARYNGOSCOPY (N/A)  REIMPLANTATION, PARATHYROID TISSUE (N/A) 7 Days Post-Op    Recommendations:     Discharge Recommendations:  home   Discharge Equipment Recommendations: none   Barriers to discharge: None    Plan:     During this hospitalization, patient to be seen 3 x/week to address the above listed problems via gait training,therapeutic activities  · Plan of Care Expires:  02/08/22   Plan of Care Reviewed with: patient    This Plan of care has been discussed with the patient who was involved in its development and understands and is in agreement with the identified goals and treatment plan    Subjective     Communicated with nurse (Amara) prior to session.     Patient comments: Pt with no complaints  Pain/Comfort:  · Pain Rating 1: 0/10  · Pain Rating Post-Intervention 1: 0/10    Objective:     Patient found with: GLENN drain,PEG Tube,peripheral IV    Patient found up in the chair upon PT entry to room, agreeable to treatment.  No family present in the room.    RESPIRATORY STATUS:   Room air (neck breather)    General Precautions: Standard, aspiration,fall,respiratory (Pt is a neck breather)  Orthopedic Precautions:N/A   Braces: N/A       BED MOBILITY       NP 2* pt found/left seated up in the chair     TRANSFERS  (vc's for hand placement, sequencing of task and safety)   Patient completed Sit <> Stand Transfer from BS chair with sup for safety with no assistive device x1 trial(s)   Patient completed Stand <> Sit Transfer to BS chair with sup for safety with no assistive device      GAIT: in hallway   Patient ambulated: 400ft   Patient required: sup for safety   Patient used:  No Assistive Device   Gait Pattern observed: reciprocal gait   Gait Deviation(s):  mild instability, but no LOB    Comments: vc's for safety      EDUCATION  Patient provided with daily orientation and goals of this PT session. They were educated to call for assistance and to transfer with hospital staff only.  Also, pt was educated on the effects of prolonged immobility and the importance of performing OOB activity and exercises to promote healing and reduce recovery time    Whiteboard updated with correct mobility information. RN/PCT notified.  Pt safe to transfer OOB/BTB and amb in hallway with RN/PCT: Use no AD with sup.    Patient left up in chair, with  all lines intact, call button in reach and nurse notified    AM-PAC 6 CLICK MOBILITY  Turning over in bed (including adjusting bedclothes, sheets and blankets)?: 3  Sitting down on and standing up from a chair with arms (e.g., wheelchair, bedside commode, etc.): 3  Moving from lying on back to sitting on the side of the bed?: 3  Moving to and from a bed to a chair (including a wheelchair)?: 3  Need to walk in hospital room?: 3  Climbing 3-5 steps with a railing?: 3  Basic Mobility Total Score: 18     Assessment:     Cyrus Batres Jr. is a 71 y.o. male admitted with a medical diagnosis of Larynx neoplasm malignant.  He presents with the following impairments/functional limitations:  weakness,impaired endurance,impaired self care skills,impaired functional mobilty. requiring light assistance and verbal cues for transfers and gait.   Pt remains motivated to participate in PT session and will cont to benefit from skilled PT intervention.      Rehab Prognosis:  Good; patient would benefit from acute skilled PT services to address these deficits and reach maximum level of function.      GOALS:   Multidisciplinary Problems     Physical Therapy Goals        Problem: Physical Therapy Goal    Goal Priority Disciplines Outcome Goal Variances Interventions   Physical Therapy Goal     PT, PT/OT Ongoing, Progressing     Description: Goals to be met by:  2022     Patient will increase functional independence with mobility by performin. Sit to stand transfer with Jack -not met  2. Gait  x 300 feet with Modified Jack using least restrictive assistive device. -not met  3. Ascend/descend 1 step without hand rail with supervision -not met             Multidisciplinary Problems (Resolved)        Problem: Physical Therapy Goal    Goal Priority Disciplines Outcome Goal Variances Interventions   Physical Therapy Goal   (Resolved)     PT, PT/OT Met            Problem: Physical Therapy Goal    Goal Priority Disciplines Outcome Goal Variances Interventions   Physical Therapy Goal   (Resolved)     PT, PT/OT Met                     Time Tracking:     PT Received On: 22  PT Start Time: 1344     PT Stop Time: 1414  PT Total Time (min): 30 min     Billable Minutes: Gait Training 20 and Therapeutic Activity 10    Treatment Type: Treatment  PT/PTA: PTA     PTA Visit Number: 1       BRIGITTE Harding.  Pager [phone]    2022    .

## 2022-01-13 NOTE — ASSESSMENT & PLAN NOTE
BG goal 140-180    - Continue Mod Dose SQ Insulin Correction Scale PRN hyperglycemia.   - BG monitoring every 4 hours while NPO.     ** Please call Endocrine for any BG related issues **  ** Please notify Endocrine for any change and/or advance in diet**    Lab Results   Component Value Date    HGBA1C 5.9 (H) 01/05/2022     Discharge Planning:   TBD. Please notify endocrinology prior to discharge.

## 2022-01-13 NOTE — ASSESSMENT & PLAN NOTE
Continue q4h flap checks: Record Doppler Pulses (artery and vein) ,Capillary Refill , Color, Turgor, temperature.   - Neurovascular Checks q4h of bilateral upper and lower extremities   - Keep head elevated and in neutral position  - FOR ANY FLAP ISSUES, PLEASE PHONE ENT RESIDENT ON CALL.  - Please label drains carefully and document accordingly  - Maintain xeroform in left ear canal  - Bacitracin twice daily to incision line     Neuro:  - Alert, oriented. Pain medication PRN.      CV:  - HDS. SBP > 90. A-line can be removed  - continue with q1h flap checks     Pulm:  - stable     GI/FEN/Lytes:  - Strict NPO  - monitor calcium closely and replete prn  - bolus TFs today  - replace lytes prn     Renal:  - UOP adequate. Juarez out.      Heme/ID:  - cont to trend HH  - cont Unasyn     Endo:  - Q6 acuchecks, SSI.      PPX:  PT/OT  L40  PPI     Dispo:   Continue hospital care. Poss dc end of this week

## 2022-01-13 NOTE — TRANSFER OF CARE
"Anesthesia Transfer of Care Note    Patient: Cyrus Batres Jr.    Procedure(s) Performed: Procedure(s) (LRB):  LARYNGECTOMY (N/A)  DISSECTION, NECK (Bilateral)  CREATION, FREE FLAP (Right)  THYROIDECTOMY  LARYNGOSCOPY (N/A)  REIMPLANTATION, PARATHYROID TISSUE (N/A)    Patient location: ICU    Anesthesia Type: general    Transport from OR: Upon arrival to PACU/ICU, patient attached to 100% O2 by T-piece with adequate spontaneous ventilation    Post pain: adequate analgesia    Post assessment: no apparent anesthetic complications    Post vital signs: stable    Level of consciousness: sedated    Nausea/Vomiting: no nausea/vomiting    Complications: none    Transfer of care protocol was followed      Last vitals:   Visit Vitals  /64 (BP Location: Left arm, Patient Position: Lying)   Pulse (!) 54   Temp 36.2 °C (97.1 °F) (Axillary)   Resp 16   Ht 5' 6" (1.676 m)   Wt 66 kg (145 lb 8.1 oz)   SpO2 100%   BMI 23.48 kg/m²     "

## 2022-01-13 NOTE — ANESTHESIA POSTPROCEDURE EVALUATION
Anesthesia Post Evaluation    Patient: Cyrus Batres Jr.    Procedure(s) Performed: Procedure(s) (LRB):  LARYNGECTOMY (N/A)  DISSECTION, NECK (Bilateral)  CREATION, FREE FLAP (Right)  THYROIDECTOMY  LARYNGOSCOPY (N/A)  REIMPLANTATION, PARATHYROID TISSUE (N/A)    Final Anesthesia Type: general      Patient location during evaluation: ICU  Patient participation: No - Unable to Participate, Sedation  Level of consciousness: sedated  Post-procedure vital signs: reviewed and stable  Pain management: adequate  Airway patency: patent    PONV status at discharge: No PONV  Anesthetic complications: no      Cardiovascular status: hemodynamically stable  Respiratory status: spontaneous ventilation and Tracheostomy  Hydration status: euvolemic  Follow-up not needed.          Vitals Value   /64   Temp 36.2 °C (97.1 °F)   Pulse 54   Resp 16   SpO2 100 %         No case tracking events are documented in the log.      Pain/Alexi Score: Pain Rating Prior to Med Admin: 1

## 2022-01-13 NOTE — PT/OT/SLP PROGRESS
Occupational Therapy   Co-Treatment  Co-treatment with SLP for maximal pt participation, safety, and activity tolerance     Name: Cyrus Batres Jr.  MRN: [id number]  Admitting Diagnosis:  Larynx neoplasm malignant  7 Days Post-Op    Recommendations:     Discharge Recommendations: home  Discharge Equipment Recommendations:  none  Barriers to discharge:  None    Assessment:     Cyrus Batres Jr. is a 71 y.o. male with a medical diagnosis of Larynx neoplasm malignant.  Pt presents with decreased endurance and impaired mobility performance as limited by cardiovascular status and generalized weakness. Pt found upright and agreeable for therapy today with SLP present. Focus of today's session included bedroom and bathroom mobiltiy for standing ADL tasks including stoma care. Pt required SBA-CGA for mobility with pt navigating own IV pole during mobility. Pt communicating via straw method with SLP in addition to mouthing words with improvements in annunciation noted. Pt appearing optimistic with recovery and is eager to continue improvements in ADL/IADL completion prior to transitioning home. Pt would benefit from continued OT skilled services 3x/wk to improve daily living skills to optimize QOL. Pt is recommended to discharge to home at this time. Performance deficits affecting function are weakness,impaired self care skills,impaired endurance,impaired functional mobilty.     Rehab Prognosis:  Good; patient would benefit from acute skilled OT services to address these deficits and reach maximum level of function.       Plan:     Patient to be seen 3 x/week to address the above listed problems via self-care/home management,therapeutic activities,therapeutic exercises  · Plan of Care Expires: 12/30/21  · Plan of Care Reviewed with: patient    Subjective     Pain/Comfort:  · Pain Rating 1: 0/10  · Pain Rating Post-Intervention 1: 0/10    Objective:     Communicated with: RN prior to session.  Patient found HOB elevated with GLENN  drain,PEG Tube,peripheral IV upon OT entry to room.    General Precautions: Standard, fall,respiratory,aspiration   Orthopedic Precautions:N/A   Braces: N/A  Respiratory Status: Room air     Occupational Performance:     Bed Mobility:    · Patient completed Rolling/Turning to Left with  stand by assistance  · Patient completed Scooting/Bridging with stand by assistance  · Patient completed Supine to Sit with stand by assistance     Functional Mobility/Transfers:  · Patient completed Sit <> Stand Transfer with stand by assistance  with  no assistive device   · Patient completed Bed <> Chair Transfer using Step Transfer technique with contact guard assistance with no assistive device  · Functional Mobility: Pt stood and completed bedroom and bathroom mobility today tolerating 7 minutes standing at sink. Pt required SBA-CGA with pt initially navigating own IV pole.     Activities of Daily Living:  · Grooming: stand by assistance with pt tolerating 7 minutes at sink to complete several steps in stoma care and associated hygiene of HME piece       [facility] 6 Click ADL: 18    Treatment & Education:  Pt educated on role of occupational therapy, POC, and safety during ADLs and functional mobility. Pt and OT discussed importance of safe, continued mobility to optimize daily living skills. Pt verbalized understanding.   Pt completed the following during session: bed mobility, sit<Stand t/f, bedroom and bathroom mobility, standing grooming tasks, safe setup in bedside chair.  White board updated during session. Pt given instruction to call for medical staff/nurse for assistance.       Patient left up in chair with all lines intact, call button in reach and RN notifiedEducation:      GOALS:   Multidisciplinary Problems     Occupational Therapy Goals        Problem: Occupational Therapy Goal    Goal Priority Disciplines Outcome Interventions   Occupational Therapy Goal     OT, PT/OT Ongoing, Progressing    Description: Goals to be  met by: 7 days 1/16/22     Patient will increase functional independence with ADLs by performing:    Pt to complete UE dressing with set-up  Pt to complete LE dressing with Set-up  Pt to complete standing g/h skills with supervision.  Pt to complete t/f to commode with supervision.  Pt to complete toileting with supervision.              Multidisciplinary Problems (Resolved)        Problem: Occupational Therapy Goal    Goal Priority Disciplines Outcome Interventions   Occupational Therapy Goal   (Resolved)     OT, PT/OT Met    Description: Skilled OT services not necessary at this time secondary to patient performing ADLs at [facility]. Patient in agreement. Please re-consult OT if change in patient's functional status is noted.                    Time Tracking:     OT Date of Treatment: 01/13/22  OT Start Time: 1121  OT Stop Time: 1140  OT Total Time (min): 19 min    Billable Minutes:Self Care/Home Management 19 min    OT/HENRY: OT     HENRY Visit Number: 0    1/13/2022

## 2022-01-13 NOTE — RESPIRATORY THERAPY
RAPID RESPONSE RESPIRATORY THERAPY PROACTIVE NOTE           Time of visit: 952     Code Status: Full Code   : 1950  Bed: 1045/1045 A:   MRN: [id number]  Time spent at the bedside: 15 -30 min    SITUATION    Evaluated patient for: LDA Check     BACKGROUND    Patient has a past medical history of Allergy, Atrial fibrillation, Chronic anticoagulation, Diabetes mellitus, type 2, Hypertension, and Larynx neoplasm malignant.  Clinically Significant Surgical Hx: laryngectomy    24 Hours Vitals Range:  Temp:  [97.1 °F (36.2 °C)-98.2 °F (36.8 °C)]   Pulse:  [54-82]   Resp:  [16-20]   BP: (124-146)/(64-75)   SpO2:  [96 %-100 %]     Labs:    Recent Labs     22  0343 22  0450 22  0435    137 135*   K 3.9 4.3 4.3    102 101   CO2 26 26 28   CREATININE 0.7 0.8 0.8   * 148* 159*   PHOS 3.9 4.8* 4.9*   MG 1.7 1.9 1.8        No results for input(s): PH, PCO2, PO2, HCO3, POCSATURATED, BE in the last 72 hours.    ASSESSMENT/INTERVENTIONS    Upon arrival in room Pt resting comfortably in bed. All Cheli supplies at bedside.    Last VS   Temp: 97.1 °F (36.2 °C) (708)  Pulse: 65 (952)  Resp: 17 (952)  BP: 129/64 (708)  SpO2: 97 % (952)    Level of Consciousness: Level of Consciousness (AVPU): alert  Respiratory Effort: Respiratory Effort: Normal,Unlabored Expansion/Accessory Muscle Usage: Expansion/Accessory Muscles/Retractions: no use of accessory muscles,no retractions,expansion symmetric  All Lung Field Breath Sounds: All Lung Fields Breath Sounds: Anterior:,Posterior:,Lateral:,diminished  EVER Breath Sounds: Anterior:,Lateral:  LLL Breath Sounds: diminished  RUL Breath Sounds: coarse,crackles  RML Breath Sounds: clear  RLL Breath Sounds: diminished  O2 Device/Concentration: Flow (L/min): 5, Oxygen Concentration (%): 28, O2 Device (Oxygen Therapy): room air  Surgical airway: Pt had laryengectomy.    Ambu at bedside: Ambu bag with the patient?: Yes, Adult  Ambu     Active Orders   Respiratory Care    Pulse Oximetry Continuous     Frequency: Continuous     Number of Occurrences: Until Specified    Routine tracheostomy care     Frequency: BID     Number of Occurrences: Until Specified       RECOMMENDATIONS    We recommend: RRT Recs: Continue POC per primary team.      FOLLOW-UP    Please call back the Rapid Response RT, Brianna Vitale, RRT at x [phone] for any questions or concerns.

## 2022-01-13 NOTE — PLAN OF CARE
01/13/22 1413   Post-Acute Status   Post-Acute Authorization HME   HME Status (!) Pending Delivery   Pt pending delivery/education of formula per [facility].  Suction machine will be delivered at 8 AM tomorrow per [facility].      Diana Fabian LMSW  [facility]  [phone]

## 2022-01-13 NOTE — SUBJECTIVE & OBJECTIVE
"Interval HPI:   Overnight events: Remains in GISSU. POD 7. BG reasonably well controlled with prn correction scale. Diet NPO  Eating:   NPO  Nausea: No  Hypoglycemia and intervention: No  Fever: No  TPN and/or TF: Yes  If yes, type of TF/TPN and rate: TF at 50 ml/hr    /64 (BP Location: Left arm, Patient Position: Lying)   Pulse (!) 54   Temp 97.1 °F (36.2 °C) (Axillary)   Resp 16   Ht 5' 6" (1.676 m)   Wt 66 kg (145 lb 8.1 oz)   SpO2 100%   BMI 23.48 kg/m²     Labs Reviewed and Include    Recent Labs   Lab 01/13/22  0435   *   CALCIUM 10.6*   *   K 4.3   CO2 28      BUN 23   CREATININE 0.8     Lab Results   Component Value Date    WBC 8.51 01/13/2022    HGB 8.8 (L) 01/13/2022    HCT 27.0 (L) 01/13/2022    MCV 85 01/13/2022     01/13/2022     No results for input(s): TSH, FREET4 in the last 168 hours.  Lab Results   Component Value Date    HGBA1C 5.9 (H) 01/05/2022       Nutritional status:   Body mass index is 23.48 kg/m².  Lab Results   Component Value Date    ALBUMIN 1.9 (L) 01/07/2022    ALBUMIN 2.6 (L) 01/06/2022    ALBUMIN 2.8 (L) 01/05/2022     No results found for: PREALBUMIN    Estimated Creatinine Clearance: 77.5 mL/min (based on SCr of 0.8 mg/dL).    Accu-Checks  Recent Labs     01/11/22  1658 01/11/22 2011 01/12/22  0035 01/12/22  0449 01/12/22  0745 01/12/22  1159 01/12/22  1659 01/12/22 2016 01/13/22  0051 01/13/22  0434   POCTGLUCOSE 125* 146* 155* 151* 148* 154* 151* 168* 171* 184*       Current Medications and/or Treatments Impacting Glycemic Control  Immunotherapy:    Immunosuppressants     None        Steroids:   Hormones (From admission, onward)            None        Pressors:    Autonomic Drugs (From admission, onward)            None        Hyperglycemia/Diabetes Medications:   Antihyperglycemics (From admission, onward)            Start     Stop Route Frequency Ordered    01/12/22 1202  insulin aspart U-100 pen 1-10 Units         -- SubQ Every 4 hours " PRN 01/12/22 1102

## 2022-01-13 NOTE — PLAN OF CARE
POC reviewed with patient who verbalized understanding. VSS on RA. AAOX4. Remains free of falls and injury. Pt up to chair and ambulate in hallway.      - R leg graft site w/staples  - neck incision w/staples  - R and L neck GLENN  - 2 x R thigh GLENN removed bedside by MD  - flap checks q2  - G tube, transition to bolus tube feeds, tolerated well  - R midline catheter  - Blood glucose being monitored every 4 hours with coverage needed     NPO, denies nausea. Pain controlled with scheduled medications per MAR. Telemetry being monitored running NSR. Patient denies chest pain & SOB. No acute events. No distress noted. Bed in lowest position, call light within reach, frequent rounds made for safety.      [facility]      Problem: Adult Inpatient Plan of Care  Goal: Readiness for Transition of Care  Outcome: Ongoing, Progressing     Problem: Fall Injury Risk  Goal: Absence of Fall and Fall-Related Injury  Outcome: Ongoing, Progressing     Problem: Skin Injury Risk Increased  Goal: Skin Health and Integrity  Outcome: Ongoing, Progressing     Problem: Infection  Goal: Absence of Infection Signs and Symptoms  Outcome: Ongoing, Progressing

## 2022-01-13 NOTE — PROGRESS NOTES
[facility]  Otorhinolaryngology-Head & Neck Surgery  Progress Note    Subjective:     Post-Op Info:  Procedure(s) (LRB):  LARYNGECTOMY (N/A)  DISSECTION, NECK (Bilateral)  CREATION, FREE FLAP (Right)  THYROIDECTOMY  LARYNGOSCOPY (N/A)  REIMPLANTATION, PARATHYROID TISSUE (N/A)   7 Days Post-Op  Hospital Day: 20     Interval History: NAEON.     Medications:  Continuous Infusions:   dextrose 10 % in water (D10W)       Scheduled Meds:   acetaminophen  650 mg Per G Tube Q6H    ampicillin-sulbactim (UNASYN) IVPB  3 g Intravenous Q8H    apixaban  5 mg Per G Tube BID    calcitrioL  0.5 mcg Per G Tube BID    calcium carbonate  1,500 mg Per G Tube TID    diltiaZEM  30 mg Per G Tube Q6H    docusate  100 mg Per G Tube BID    famotidine  20 mg Per G Tube BID    levothyroxine  112 mcg Per G Tube Before breakfast    polyethylene glycol  17 g Per G Tube Daily    sennosides 8.8 mg/5 ml  5 mL Per G Tube Daily     PRN Meds:dextrose 10 % in water (D10W), dextrose 50%, glucagon (human recombinant), insulin aspart U-100, magnesium oxide, magnesium oxide, oxyCODONE     Review of patient's allergies indicates:   Allergen Reactions    Pollen extracts     Lovastatin Rash     Not confirmed but pt skeptical     Objective:     Vital Signs (24h Range):  Temp:  [97.4 °F (36.3 °C)-98.2 °F (36.8 °C)] 97.7 °F (36.5 °C)  Pulse:  [57-82] 71  Resp:  [17-20] 20  SpO2:  [94 %-98 %] 96 %  BP: (119-146)/(67-75) 128/75       Lines/Drains/Airways     Drain                 Gastrostomy/Enterostomy 12/27/21 1152 Percutaneous endoscopic gastrostomy (PEG) LUQ 16 days         Closed/Suction Drain 01/06/22 Right Thigh Bulb 15 Fr. 7 days         Closed/Suction Drain 01/06/22 Right;Anterior Thigh Bulb 15 Fr. 7 days         Closed/Suction Drain 01/06/22 1521 Right Neck Bulb 15 Fr. 6 days         Closed/Suction Drain 01/06/22 1524 Left Neck Bulb 15 Fr. 6 days          Airway                 Laryngectomy (Do Not Remove) 01/06/22 1655 Laryngectomy  tube 6 days          Peripheral Intravenous Line                 Midline Catheter Insertion/Assessment  - Single Lumen 01/02/22 1410 Right basilic vein (medial side of arm) other (see comments) 10 days         Peripheral IV - Single Lumen 01/10/22 0000 20 G Anterior;Left Forearm 3 days                Physical Exam  Awake, alert, nad  EOMI  Neck - slightly more edematous and tender on right side, no erythema. Left stable.   Flap - good color/turgor, strong triphasic doppler, cap refill appropriate  GLENN neck and leg with ss output, removed.   AAOx3, communicates with gestures    Significant Labs:  BMP:   Recent Labs   Lab 01/13/22  0435   *      CO2 28   BUN 23   CREATININE 0.8   CALCIUM 10.6*   MG 1.8     Cardiac Markers: No results for input(s): CKMB, TROPONINT, MYOGLOBIN in the last 168 hours.    Significant Diagnostics:  I have reviewed all pertinent imaging results/findings within the past 24 hours.    Assessment/Plan:     * Larynx neoplasm malignant    Continue q4h flap checks: Record Doppler Pulses (artery and vein) ,Capillary Refill , Color, Turgor, temperature.   - Neurovascular Checks q4h of bilateral upper and lower extremities   - Keep head elevated and in neutral position  - FOR ANY FLAP ISSUES, PLEASE PHONE ENT RESIDENT ON CALL.  - Please label drains carefully and document accordingly  - Maintain xeroform in left ear canal  - Bacitracin twice daily to incision line     Neuro:  - Alert, oriented. Pain medication PRN.      CV:  - HDS. SBP > 90. A-line can be removed  - continue with q1h flap checks     Pulm:  - stable     GI/FEN/Lytes:  - Strict NPO  - monitor calcium closely and replete prn  - bolus TFs today  - replace lytes prn     Renal:  - UOP adequate. Juarez out.      Heme/ID:  - cont to trend HH  - cont Unasyn     Endo:  - Q6 acuchecks, SSI.      PPX:  PT/OT  L40  PPI     Dispo:   Continue hospital care. Poss dc end of this week                    Hypocalcemia  Post op PTH low. Calcium  now stable on calcitriol and calcium carbonate. Will monitor labs.    S/P laryngectomy  Doing well. Laryngectomy tube in place with HME. Patient is proficient in suctioning and stoma care.  Planning to increase tube feeds to bolus and discharge at end of week.  Home tube feeds and suction supplies ordered.  Will follow up outpatient with speech therapy.     On enteral nutrition  Continue tube feeds, will advance to bolus prior to discharge.    Type 2 diabetes mellitus with hyperglycemia, without long-term current use of insulin  Sliding scale insulin and accuchecks. Endocrine consulted.    Paroxysmal atrial fibrillation  Vital signs have been stable.   Continue home Diltiazem 30mg q6h,  Restarted home Eliquis.        Yo Herrera MD  Otorhinolaryngology-Head & Neck Surgery  [facility]

## 2022-01-13 NOTE — PROGRESS NOTES
[facility] (Bolus) Tube Feeding Education/Discharge Planning Note:     Bedside home Bolus Feeding education completed with patient on 1/13/2022. Home tube feeding regimen (Bolus 5 containers of Glucerna 1.5 daily + 1 syringe water flush before and 1-2 syringes water flush after each feed) reviewed and provided. Teach back by patient demonstrated. Also provided review/education on flushing requirements, formula safety, HOB elevation requirements, giving medications through feeding tube, and feeding tube site care. Additional education materials also provided. Time allotted for questions; questions addressed appropriately. Patients home tube feeding supplies and formula have been delivered to the bedside. Provided patient with [facility] support number [phone]. Patient is ready discharge home from [facility] perspective. Patient's discharge planning team and bedside nurse updated with the information above.     Will continue to follow while inpatient.     Please do not hesitiate reach out for any additional needs in the interim.     Ethel Danielson MS, RDN, LDN  Clinical Dietitian  [facility]   Desk: [phone]  Other Phone: [phone]  [email or web address]

## 2022-01-13 NOTE — PROGRESS NOTES
[facility]  Endocrinology  Progress Note    Admit Date: 12/25/2021     Reason for Consult: Management of T2DM, Hyperglycemia     Surgical Procedure and Date:   12/27/21  CREATION, TRACHEOSTOMY (N/A)  LARYNGOSCOPY, DIRECT, WITH BRONCHOSCOPY (N/A)  INSERTION, PEG TUBE (N/A)     Diabetes diagnosis year: 2010    Home Diabetes Medications:  Ozempic stopped on 10/18/21 by Dena Silveira as a1c below goal     How often checking glucose at home?  Once daily    BG readings on regimen: 150s  Hypoglycemia on the regimen?  No  Missed doses on regimen?  n/a    Diabetes Complications include:     None     Complicating diabetes co morbidities:   pAfib, HTN, active cancer       HPI:   Patient is a 71 y.o. 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 He presented as a transfer from [facility] for ENT evaluation. He initially presented to the ER for worsening hemoptysis. Laryngeal videostroboscopy 11/8/21 notable for webbing across membranous vocal folds, granular changes infraglottically, firbinous debris across anterior commissure, post surgical stiffness of bilateral true vocal folds, phonatory supraglottic hyperfunction, occasional plica ventricularis, thick salivary secretions, pooled in pyriforms, diffuse mild laryngopharyngeal edema.He is now s/p the above procedure. He was last seen by MISSY Gutierrez on 10/18/21 for DM management. Endocrinology consulted for management of T2DM.            Lab Results   Component Value Date    HGBA1C 5.5 10/05/2021           Interval HPI:   Overnight events: Remains in [facility]. POD 7. BG reasonably well controlled with prn correction scale. Diet NPO  Eating:   NPO  Nausea: No  Hypoglycemia and intervention: No  Fever: No  TPN and/or TF: Yes  If yes, type of TF/TPN and rate: TF at 50 ml/hr    /64 (BP Location: Left arm, Patient Position: Lying)   Pulse (!) 54   Temp 97.1 °F (36.2 °C) (Axillary)   " Resp 16   Ht 5' 6" (1.676 m)   Wt 66 kg (145 lb 8.1 oz)   SpO2 100%   BMI 23.48 kg/m²     Labs Reviewed and Include    Recent Labs   Lab 01/13/22  0435   *   CALCIUM 10.6*   *   K 4.3   CO2 28      BUN 23   CREATININE 0.8     Lab Results   Component Value Date    WBC 8.51 01/13/2022    HGB 8.8 (L) 01/13/2022    HCT 27.0 (L) 01/13/2022    MCV 85 01/13/2022     01/13/2022     No results for input(s): TSH, FREET4 in the last 168 hours.  Lab Results   Component Value Date    HGBA1C 5.9 (H) 01/05/2022       Nutritional status:   Body mass index is 23.48 kg/m².  Lab Results   Component Value Date    ALBUMIN 1.9 (L) 01/07/2022    ALBUMIN 2.6 (L) 01/06/2022    ALBUMIN 2.8 (L) 01/05/2022     No results found for: PREALBUMIN    Estimated Creatinine Clearance: 77.5 mL/min (based on SCr of 0.8 mg/dL).    Accu-Checks  Recent Labs     01/11/22  1658 01/11/22 2011 01/12/22  0035 01/12/22  0449 01/12/22  0745 01/12/22  1159 01/12/22  1659 01/12/22 2016 01/13/22  0051 01/13/22  0434   POCTGLUCOSE 125* 146* 155* 151* 148* 154* 151* 168* 171* 184*       Current Medications and/or Treatments Impacting Glycemic Control  Immunotherapy:    Immunosuppressants     None        Steroids:   Hormones (From admission, onward)            None        Pressors:    Autonomic Drugs (From admission, onward)            None        Hyperglycemia/Diabetes Medications:   Antihyperglycemics (From admission, onward)            Start     Stop Route Frequency Ordered    01/12/22 1202  insulin aspart U-100 pen 1-10 Units         -- SubQ Every 4 hours PRN 01/12/22 1102          ASSESSMENT and PLAN    * Larynx neoplasm malignant  Managed per primary team  Optimize BG control        Type 2 diabetes mellitus with hyperglycemia, without long-term current use of insulin  BG goal 140-180    - Continue Mod Dose SQ Insulin Correction Scale PRN hyperglycemia.   - BG monitoring every 4 hours while NPO.     ** Please call Endocrine for " any BG related issues **  ** Please notify Endocrine for any change and/or advance in diet**    Lab Results   Component Value Date    HGBA1C 5.9 (H) 01/05/2022     Discharge Planning:   TBD. Please notify endocrinology prior to discharge.         On enteral nutrition  Enteral nutrition may increase blood glucose.  Optimize BG control      Adverse effect of adrenal cortical steroids, sequela  On steroid therapy per transplant team; may elevate BG readings        Hyperlipidemia  Managed per primary team  Condition may cause insulin resistance             Destini Guy NP  Endocrinology  [facility]

## 2022-01-13 NOTE — PT/OT/SLP PROGRESS
[facility] - Surgical Intensive Care  Total Laryngectomy Treatment Note  Co-Treatment w/ OT   Patient Name:  Cyrus Batres Jr.   MRN:  [id number]  Admitting Diagnosis: Larynx neoplasm malignant    PATIENT IS A NECK-BREATHER - DO NOT ORALLY INTUBATE    Recommendations:                 General Recommendations:  post-op education  Diet recommendations:  Patient NPO until cleared by ENT  General Precautions: Standard, fall,respiratory    Communication:      Communication strategies: Eyeglasses, Provide increased time to answer, Go to room if call light pushed, Encourage use of communication device (AL) and Whiteboard/Paper-pencil provided   Patient communicating all wants and needs? yes   Supplies labeled with patient information?  yes   RN notified if supplies brought to patient's room?  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       Past Surgical History:   Procedure Laterality Date    DIRECT LARYNGOBRONCHOSCOPY N/A 12/27/2021    Procedure: LARYNGOSCOPY, DIRECT, WITH BRONCHOSCOPY;  Surgeon: Flex Espinosa MD;  Location: [address];  Service: ENT;  Laterality: N/A;    DISSECTION OF NECK Bilateral 1/6/2022    Procedure: DISSECTION, NECK;  Surgeon: Jesse James MD;  Location: [address];  Service: ENT;  Laterality: Bilateral;    FLAP PROCEDURE Right 1/6/2022    Procedure: CREATION, FREE FLAP;  Surgeon: Alise Hart MD;  Location: [address];  Service: ENT;  Laterality: Right;  Ischemic start 1351  Ischemic stop 1502    LARYNGECTOMY N/A 1/6/2022    Procedure: LARYNGECTOMY;  Surgeon: Jesse James MD;  Location: [address];  Service: ENT;  Laterality: N/A;    LARYNGOSCOPY N/A 8/4/2020    Procedure: Suspension microlaryngoscopy with biopsy, possible KTP laser treatment/excision;  Surgeon: Stew  DANDRE Noel MD;  [facility];  Service: ENT;  Laterality: N/A;  Microscope, telescopes, tower, microinstruments, KTP laser, rep conf# [id number] IC 7/28.    LARYNGOSCOPY N/A 3/16/2021    Procedure: Suspension microlaryngoscopy with excision of lesion, possible CO2 laser;  Surgeon: Stew Noel MD;  [facility];  Service: ENT;  Laterality: N/A;  Microscope, telescopes, tower, microinstruments, CO2 laser, rep conf# [id number] IC 3/4.    LARYNGOSCOPY N/A 4/1/2021    Procedure: Suspension microlaryngoscopy with KTP laser excision of lesion;  Surgeon: Stew Noel MD;  [facility];  Service: ENT;  Laterality: N/A;  Microscope, telescopes, tower, microinstruments, 70 degree scope, vocal fold , KTP laser, rep conf# [id number] BC    LARYNGOSCOPY N/A 12/9/2021    Procedure: Suspension microlaryngoscopy with biopsy;  Surgeon: Stew Noel MD;  [facility];  Service: ENT;  Laterality: N/A;  Microscope, telescopes, tower, microinstruments    LARYNGOSCOPY N/A 1/6/2022    Procedure: LARYNGOSCOPY;  Surgeon: Jesse James MD;  Location: [facility] OR [address] [place] [address];  Service: ENT;  Laterality: N/A;    MICROLARYNGOSCOPY N/A 3/17/2020    Procedure: MICROLARYNGOSCOPY;  Surgeon: Jung Xiao MD;  Location: [facility];  Service: ENT;  Laterality: N/A;  Laser Microlaryngoscopy  NEED TO SCHEDULE LASER from [employer] [phone]    REIMPLANTATION OF PARATHYROID TISSUE N/A 1/6/2022    Procedure: REIMPLANTATION, PARATHYROID TISSUE;  Surgeon: Jesse James MD;  [facility];  Service: ENT;  Laterality: N/A;    THYROIDECTOMY  1/6/2022    Procedure: THYROIDECTOMY;  Surgeon: Jesse James MD;  [facility];  Service: ENT;;    TRACHEOSTOMY N/A 12/27/2021    Procedure: CREATION, TRACHEOSTOMY;  Surgeon: Flex Espinosa MD;  Location: [facility] OR [address] [place] [address];  Service: ENT;  Laterality: N/A;         Date of Surgery: 1/6/22    The  "patient received a total laryngectomy with bilateral neck dissection.  PEG ?  yes  TEP placed at time of surgery?  no    Subjective:     Awake/alert in bed  Pt seen in conjunction with OT to assist with mobility to bathroom for stoma care     Pain/Comfort:  Pain Rating 1: 0/10  Pain Rating Post-Intervention 1: 0/10  Location 2: abdomen  Pain Addressed 2: Reposition,Distraction    Respiratory Status: Room air    Objective:     Communication:  · Gestures/Facial Expressions  · Mouthing Words  · Artificial Larynx Trials:  Yes  Placement:  Able to achieve independently  · Timing:  Using appropriately with short phrases  · Intelligibility:  Phrase 90%   · Pt initially activating switch every word and SLP provided education for pt to switch on AL for 3-4 words at a time to replicate more natural speech patterns    Additional Treatment:  Mp tube with HME in place throughout session. Pt walked to bathroom with SLP and OT and demonstrated independent ability to complete stoma care this date.  Pt placed clean mp tube with fresh HME back into stoma.  Pt recalled information about post-op kit with 100% accy ind'ly. He utilized AL throughout session eeffectively and also communicated effectively with mouthing of words and gestures. Pt reports may be discharged tomorrow.     Education:     General Post-Operative Education Provided:  · SLP discussed:   Anatomical changes that will occur in respiration, communication, coughing, sneezing, blowing nose, and swallowing   · Timelines of swallowing assessments and progression of textures were detailed  · Patient will be a total neck breather and that in the event of emergency, oxygen must be provided to stoma   · Patient advised to contact local fire department to update emergency personnel that patient will be a "total neck breather"   · Educated on stoma care and how to perform   · Use and benefits of heat and moisture exchanges (HMEs) to provide humidity to stoma   · Basic " surgical course and recovery  · Expectations for follow up and discharge   · Lifestyle changes, including importance of avoiding water to stoma provided.  · SLP demonstrated use of an artificial larynx (AL) on neck and external cheek. Discussed AL speech with an oral adaptor.  · Patient engaged in education and demonstrating adequate understanding of all topics reviewed.    Alternate Airway Precautions in Place:  · All laryngectomy precautions present: Orange signs above HOB and on door, communication impaired wristband, Pediatric ambu bag mask (size 3)  ·  notified patient has laryngectomy and to call RN immediately when patient presses call bell.  · RN notified to enter room immediately when called to attend patient.    Impressions:  · Patient would benefit from ongoing education/support of  Post-operative anatomy, physical and functional limitations  · Permanency of thacheostoma  · Contents of post-laryngectomy kit  · The ability to perform Stoma care.    Goals:   Multidisciplinary Problems     SLP Goals        Problem: SLP Goal    Goal Priority Disciplines Outcome   SLP Goal     SLP Ongoing, Progressing   Description: Speech Language Pathology Goals  Goals expected to be met by 1/15    1. Pt/family will actively participate in post-laryngectomy education to facilitate [place] and desensitization  2. Pt will communicate contextualized phrases via AL with approximately 60% intelligibility provided min cues to improve communication.  3. Pt/family will perform stoma care x1 per session provided min cues to facilitate [place].                           Plan:     · Patient to be seen:  5 x/week   · Plan of Care expires:  02/06/22  · Plan of Care reviewed with:  patient   · SLP Follow-Up:  Yes       Discharge recommendations:  home     Time Tracking:     SLP Treatment Date:   01/13/22  Speech Start Time:  1110  Speech Stop Time:  1131     Speech Total Time (min):  21 min    Billable Minutes: Speech  Therapy Individual 11 and Self Care/Home Management Training 10  01/13/2022

## 2022-01-13 NOTE — SUBJECTIVE & OBJECTIVE
Interval History: NAEON.     Medications:  Continuous Infusions:   dextrose 10 % in water (D10W)       Scheduled Meds:   acetaminophen  650 mg Per G Tube Q6H    ampicillin-sulbactim (UNASYN) IVPB  3 g Intravenous Q8H    apixaban  5 mg Per G Tube BID    calcitrioL  0.5 mcg Per G Tube BID    calcium carbonate  1,500 mg Per G Tube TID    diltiaZEM  30 mg Per G Tube Q6H    docusate  100 mg Per G Tube BID    famotidine  20 mg Per G Tube BID    levothyroxine  112 mcg Per G Tube Before breakfast    polyethylene glycol  17 g Per G Tube Daily    sennosides 8.8 mg/5 ml  5 mL Per G Tube Daily     PRN Meds:dextrose 10 % in water (D10W), dextrose 50%, glucagon (human recombinant), insulin aspart U-100, magnesium oxide, magnesium oxide, oxyCODONE     Review of patient's allergies indicates:   Allergen Reactions    Pollen extracts     Lovastatin Rash     Not confirmed but pt skeptical     Objective:     Vital Signs (24h Range):  Temp:  [97.4 °F (36.3 °C)-98.2 °F (36.8 °C)] 97.7 °F (36.5 °C)  Pulse:  [57-82] 71  Resp:  [17-20] 20  SpO2:  [94 %-98 %] 96 %  BP: (119-146)/(67-75) 128/75       Lines/Drains/Airways     Drain                 Gastrostomy/Enterostomy 12/27/21 1152 Percutaneous endoscopic gastrostomy (PEG) LUQ 16 days         Closed/Suction Drain 01/06/22 Right Thigh Bulb 15 Fr. 7 days         Closed/Suction Drain 01/06/22 Right;Anterior Thigh Bulb 15 Fr. 7 days         Closed/Suction Drain 01/06/22 1521 Right Neck Bulb 15 Fr. 6 days         Closed/Suction Drain 01/06/22 1524 Left Neck Bulb 15 Fr. 6 days          Airway                 Laryngectomy (Do Not Remove) 01/06/22 1655 Laryngectomy tube 6 days          Peripheral Intravenous Line                 Midline Catheter Insertion/Assessment  - Single Lumen 01/02/22 1410 Right basilic vein (medial side of arm) other (see comments) 10 days         Peripheral IV - Single Lumen 01/10/22 0000 20 G Anterior;Left Forearm 3 days                Physical Exam  Awake,  alert, nad  EOMI  Neck - slightly more edematous and tender on right side, no erythema. Left stable.   Flap - good color/turgor, strong triphasic doppler, cap refill appropriate  GLENN neck and leg with ss output, removed.   AAOx3, communicates with gestures    Significant Labs:  BMP:   Recent Labs   Lab 01/13/22  0435   *      CO2 28   BUN 23   CREATININE 0.8   CALCIUM 10.6*   MG 1.8     Cardiac Markers: No results for input(s): CKMB, TROPONINT, MYOGLOBIN in the last 168 hours.    Significant Diagnostics:  I have reviewed all pertinent imaging results/findings within the past 24 hours.

## 2022-01-14 VITALS
DIASTOLIC BLOOD PRESSURE: 68 MMHG | BODY MASS INDEX: 23.38 KG/M2 | TEMPERATURE: 98 F | WEIGHT: 145.5 LBS | HEART RATE: 85 BPM | HEIGHT: 66 IN | RESPIRATION RATE: 19 BRPM | OXYGEN SATURATION: 92 % | SYSTOLIC BLOOD PRESSURE: 128 MMHG

## 2022-01-14 DIAGNOSIS — Z90.02 S/P LARYNGECTOMY: Primary | ICD-10-CM

## 2022-01-14 DIAGNOSIS — E83.51 HYPOCALCEMIA: ICD-10-CM

## 2022-01-14 LAB
ANION GAP SERPL CALC-SCNC: 7 MMOL/L (ref 8–16)
BASOPHILS # BLD AUTO: 0.03 K/UL (ref 0–0.2)
BASOPHILS NFR BLD: 0.3 % (ref 0–1.9)
BUN SERPL-MCNC: 27 MG/DL (ref 8–23)
CA-I BLDV-SCNC: 1.12 MMOL/L (ref 1.06–1.42)
CALCIUM SERPL-MCNC: 10.3 MG/DL (ref 8.7–10.5)
CHLORIDE SERPL-SCNC: 101 MMOL/L (ref 95–110)
CO2 SERPL-SCNC: 29 MMOL/L (ref 23–29)
CREAT SERPL-MCNC: 0.9 MG/DL (ref 0.5–1.4)
DIFFERENTIAL METHOD: ABNORMAL
EOSINOPHIL # BLD AUTO: 0.2 K/UL (ref 0–0.5)
EOSINOPHIL NFR BLD: 1.9 % (ref 0–8)
ERYTHROCYTE [DISTWIDTH] IN BLOOD BY AUTOMATED COUNT: 13.1 % (ref 11.5–14.5)
EST. GFR  (AFRICAN AMERICAN): >60 ML/MIN/1.73 M^2
EST. GFR  (NON AFRICAN AMERICAN): >60 ML/MIN/1.73 M^2
GLUCOSE SERPL-MCNC: 140 MG/DL (ref 70–110)
HCT VFR BLD AUTO: 26.7 % (ref 40–54)
HGB BLD-MCNC: 8.7 G/DL (ref 14–18)
IMM GRANULOCYTES # BLD AUTO: 0.1 K/UL (ref 0–0.04)
IMM GRANULOCYTES NFR BLD AUTO: 1.1 % (ref 0–0.5)
LYMPHOCYTES # BLD AUTO: 0.7 K/UL (ref 1–4.8)
LYMPHOCYTES NFR BLD: 8.2 % (ref 18–48)
MAGNESIUM SERPL-MCNC: 1.8 MG/DL (ref 1.6–2.6)
MCH RBC QN AUTO: 28.1 PG (ref 27–31)
MCHC RBC AUTO-ENTMCNC: 32.6 G/DL (ref 32–36)
MCV RBC AUTO: 86 FL (ref 82–98)
MONOCYTES # BLD AUTO: 0.5 K/UL (ref 0.3–1)
MONOCYTES NFR BLD: 5.7 % (ref 4–15)
NEUTROPHILS # BLD AUTO: 7.3 K/UL (ref 1.8–7.7)
NEUTROPHILS NFR BLD: 82.8 % (ref 38–73)
NRBC BLD-RTO: 0 /100 WBC
PHOSPHATE SERPL-MCNC: 4 MG/DL (ref 2.7–4.5)
PLATELET # BLD AUTO: 383 K/UL (ref 150–450)
PMV BLD AUTO: 9.8 FL (ref 9.2–12.9)
POCT GLUCOSE: 129 MG/DL (ref 70–110)
POCT GLUCOSE: 133 MG/DL (ref 70–110)
POCT GLUCOSE: 151 MG/DL (ref 70–110)
POCT GLUCOSE: 162 MG/DL (ref 70–110)
POTASSIUM SERPL-SCNC: 4.4 MMOL/L (ref 3.5–5.1)
PTH-INTACT SERPL-MCNC: <5 PG/ML (ref 9–77)
RBC # BLD AUTO: 3.1 M/UL (ref 4.6–6.2)
SODIUM SERPL-SCNC: 137 MMOL/L (ref 136–145)
WBC # BLD AUTO: 8.88 K/UL (ref 3.9–12.7)

## 2022-01-14 PROCEDURE — 99900035 HC TECH TIME PER 15 MIN (STAT): Mod: HCNC

## 2022-01-14 PROCEDURE — 63600175 PHARM REV CODE 636 W HCPCS: Mod: HCNC | Performed by: STUDENT IN AN ORGANIZED HEALTH CARE EDUCATION/TRAINING PROGRAM

## 2022-01-14 PROCEDURE — 25000003 PHARM REV CODE 250: Mod: HCNC | Performed by: STUDENT IN AN ORGANIZED HEALTH CARE EDUCATION/TRAINING PROGRAM

## 2022-01-14 PROCEDURE — G0008 ADMIN INFLUENZA VIRUS VAC: HCPCS | Mod: HCNC | Performed by: OTOLARYNGOLOGY

## 2022-01-14 PROCEDURE — 63600175 PHARM REV CODE 636 W HCPCS: Mod: HCNC | Performed by: OTOLARYNGOLOGY

## 2022-01-14 PROCEDURE — 92507 TX SP LANG VOICE COMM INDIV: CPT | Mod: HCNC

## 2022-01-14 PROCEDURE — 25000003 PHARM REV CODE 250: Mod: HCNC | Performed by: OTOLARYNGOLOGY

## 2022-01-14 PROCEDURE — 84100 ASSAY OF PHOSPHORUS: CPT | Mod: HCNC | Performed by: STUDENT IN AN ORGANIZED HEALTH CARE EDUCATION/TRAINING PROGRAM

## 2022-01-14 PROCEDURE — 94761 N-INVAS EAR/PLS OXIMETRY MLT: CPT | Mod: HCNC

## 2022-01-14 PROCEDURE — 90686 IIV4 VACC NO PRSV 0.5 ML IM: CPT | Mod: HCNC | Performed by: OTOLARYNGOLOGY

## 2022-01-14 PROCEDURE — 85025 COMPLETE CBC W/AUTO DIFF WBC: CPT | Mod: HCNC | Performed by: STUDENT IN AN ORGANIZED HEALTH CARE EDUCATION/TRAINING PROGRAM

## 2022-01-14 PROCEDURE — 90471 IMMUNIZATION ADMIN: CPT | Mod: HCNC | Performed by: OTOLARYNGOLOGY

## 2022-01-14 PROCEDURE — 83735 ASSAY OF MAGNESIUM: CPT | Mod: HCNC | Performed by: STUDENT IN AN ORGANIZED HEALTH CARE EDUCATION/TRAINING PROGRAM

## 2022-01-14 PROCEDURE — 80048 BASIC METABOLIC PNL TOTAL CA: CPT | Mod: HCNC | Performed by: STUDENT IN AN ORGANIZED HEALTH CARE EDUCATION/TRAINING PROGRAM

## 2022-01-14 PROCEDURE — 82330 ASSAY OF CALCIUM: CPT | Mod: HCNC | Performed by: STUDENT IN AN ORGANIZED HEALTH CARE EDUCATION/TRAINING PROGRAM

## 2022-01-14 PROCEDURE — 83970 ASSAY OF PARATHORMONE: CPT | Mod: HCNC | Performed by: OTOLARYNGOLOGY

## 2022-01-14 RX ORDER — INSULIN ASPART 100 [IU]/ML
0-5 INJECTION, SOLUTION INTRAVENOUS; SUBCUTANEOUS EVERY 6 HOURS PRN
Status: DISCONTINUED | OUTPATIENT
Start: 2022-01-14 | End: 2022-01-14 | Stop reason: HOSPADM

## 2022-01-14 RX ORDER — DOCUSATE SODIUM 50 MG/5ML
100 LIQUID ORAL 2 TIMES DAILY
Qty: 473 ML | Refills: 1 | Status: SHIPPED | OUTPATIENT
Start: 2022-01-14 | End: 2022-03-08

## 2022-01-14 RX ORDER — POLYETHYLENE GLYCOL 3350 17 G/17G
17 POWDER, FOR SOLUTION ORAL DAILY
Qty: 510 G | Refills: 0 | Status: SHIPPED | OUTPATIENT
Start: 2022-01-15 | End: 2022-03-08

## 2022-01-14 RX ORDER — LEVOTHYROXINE SODIUM 112 UG/1
112 TABLET ORAL
Qty: 30 TABLET | Refills: 11 | Status: SHIPPED | OUTPATIENT
Start: 2022-01-15 | End: 2022-02-18

## 2022-01-14 RX ORDER — OXYCODONE HCL 5 MG/5 ML
5 SOLUTION, ORAL ORAL EVERY 6 HOURS PRN
Qty: 473 ML | Refills: 0 | Status: ON HOLD | OUTPATIENT
Start: 2022-01-14 | End: 2022-04-27 | Stop reason: HOSPADM

## 2022-01-14 RX ORDER — FAMOTIDINE 20 MG/1
20 TABLET, FILM COATED ORAL 2 TIMES DAILY
Qty: 60 TABLET | Refills: 11 | Status: SHIPPED | OUTPATIENT
Start: 2022-01-14 | End: 2022-03-08

## 2022-01-14 RX ORDER — CALCITRIOL 1 UG/ML
0.5 SOLUTION ORAL DAILY
Qty: 15 ML | Refills: 0 | Status: SHIPPED | OUTPATIENT
Start: 2022-01-14 | End: 2022-03-08

## 2022-01-14 RX ORDER — DILTIAZEM HYDROCHLORIDE 30 MG/1
30 TABLET, FILM COATED ORAL EVERY 6 HOURS
Qty: 120 TABLET | Refills: 2 | Status: SHIPPED | OUTPATIENT
Start: 2022-01-14 | End: 2022-05-10

## 2022-01-14 RX ORDER — GLUCAGON 1 MG
1 KIT INJECTION
Status: DISCONTINUED | OUTPATIENT
Start: 2022-01-14 | End: 2022-01-14 | Stop reason: HOSPADM

## 2022-01-14 RX ORDER — SENNOSIDES 8.8 MG/5ML
5 LIQUID ORAL DAILY PRN
Qty: 150 ML | Refills: 0 | Status: SHIPPED | OUTPATIENT
Start: 2022-01-14 | End: 2022-03-08

## 2022-01-14 RX ORDER — CALCIUM CARBONATE 1250 MG/5ML
1000 SUSPENSION ORAL 3 TIMES DAILY
Qty: 473 ML | Refills: 0 | Status: SHIPPED | OUTPATIENT
Start: 2022-01-14 | End: 2022-03-08

## 2022-01-14 RX ORDER — CEPHALEXIN 250 MG/5ML
500 POWDER, FOR SUSPENSION ORAL EVERY 8 HOURS
Qty: 300 ML | Refills: 0 | Status: SHIPPED | OUTPATIENT
Start: 2022-01-14 | End: 2022-01-14 | Stop reason: HOSPADM

## 2022-01-14 RX ADMIN — AMPICILLIN SODIUM AND SULBACTAM SODIUM 3 G: 2; 1 INJECTION, POWDER, FOR SOLUTION INTRAMUSCULAR; INTRAVENOUS at 02:01

## 2022-01-14 RX ADMIN — ACETAMINOPHEN 650 MG: 160 SOLUTION ORAL at 06:01

## 2022-01-14 RX ADMIN — ACETAMINOPHEN 650 MG: 160 SOLUTION ORAL at 11:01

## 2022-01-14 RX ADMIN — DILTIAZEM HYDROCHLORIDE 30 MG: 30 TABLET, FILM COATED ORAL at 12:01

## 2022-01-14 RX ADMIN — INSULIN ASPART 2 UNITS: 100 INJECTION, SOLUTION INTRAVENOUS; SUBCUTANEOUS at 08:01

## 2022-01-14 RX ADMIN — INFLUENZA VIRUS VACCINE 0.5 ML: 15; 15; 15; 15 SUSPENSION INTRAMUSCULAR at 03:01

## 2022-01-14 RX ADMIN — ACETAMINOPHEN 650 MG: 160 SOLUTION ORAL at 12:01

## 2022-01-14 RX ADMIN — INSULIN ASPART 2 UNITS: 100 INJECTION, SOLUTION INTRAVENOUS; SUBCUTANEOUS at 04:01

## 2022-01-14 RX ADMIN — DILTIAZEM HYDROCHLORIDE 30 MG: 30 TABLET, FILM COATED ORAL at 06:01

## 2022-01-14 RX ADMIN — DILTIAZEM HYDROCHLORIDE 30 MG: 30 TABLET, FILM COATED ORAL at 11:01

## 2022-01-14 RX ADMIN — AMPICILLIN SODIUM AND SULBACTAM SODIUM 3 G: 2; 1 INJECTION, POWDER, FOR SOLUTION INTRAMUSCULAR; INTRAVENOUS at 06:01

## 2022-01-14 RX ADMIN — APIXABAN 5 MG: 5 TABLET, FILM COATED ORAL at 08:01

## 2022-01-14 RX ADMIN — LEVOTHYROXINE SODIUM 112 MCG: 112 TABLET ORAL at 06:01

## 2022-01-14 RX ADMIN — FAMOTIDINE 20 MG: 20 TABLET ORAL at 08:01

## 2022-01-14 NOTE — PLAN OF CARE
POC reviewed with patient, spouse. AAOX4. VSS on RA. All incisions with staples are C/D/I. Cheli tube in place, capped. No suctioning required per RN. RT did come and suction and educate patient. Flap, NV checks Q4, WDL. GLENN drains to neck removed per MD. Pain controlled. No complaints of nausea. Voided per urinal. Last BM 1/13. NPO diet with bolus feeds. X3 cans of bolus feeds given. Ambulated with stand by assist.     Discharge instructions reviewed. PIV's removed. Telemetry box removed. Awaiting for meds to be delivered. Education completed, see previous note. Offered wheelchair.

## 2022-01-14 NOTE — NURSING
Home suction machine delivered to bedside this Morning. RN educated on use of machine to patient and spouse. Suction catheter kits given. Educated patient and spouse also on how to give bolus tube feedings.

## 2022-01-14 NOTE — PLAN OF CARE
Problem: SLP Goal  Goal: SLP Goal  Description: Speech Language Pathology Goals  Goals expected to be met by 1/15    1. Pt/family will actively participate in post-laryngectomy education to facilitate [place] and desensitization  2. Pt will communicate contextualized phrases via AL with approximately 60% intelligibility provided min cues to improve communication.  3. Pt/family will perform stoma care x1 per session provided min cues to facilitate [place].          Outcome: Ongoing, Progressing   Continue POC at this time, all goals remain appropriate.   1/14/2022

## 2022-01-14 NOTE — ASSESSMENT & PLAN NOTE
Continue q4h flap checks: Record Doppler Pulses (artery and vein) ,Capillary Refill , Color, Turgor, temperature.   - Neurovascular Checks q4h of bilateral upper and lower extremities   - Keep head elevated and in neutral position  - FOR ANY FLAP ISSUES, PLEASE PHONE ENT RESIDENT ON CALL.  - Please label drains carefully and document accordingly  - Maintain xeroform in left ear canal  - Bacitracin twice daily to incision line     Neuro:  - Alert, oriented. Pain medication PRN.      CV:  - HDS. SBP > 90. A-line can be removed  - continue with q1h flap checks     Pulm:  - stable     GI/FEN/Lytes:  - Strict NPO  - monitor calcium closely and replete prn.   - bolus TFs today  - replace lytes prn     Renal:  - UOP adequate. Juarez out.      Heme/ID:  - cont to trend HH  - cont Unasyn     Endo:  - Q6 acuchecks, SSI.      PPX:  PT/OT  L40  PPI     Dispo:   Continue hospital care. Poss dc end of this week

## 2022-01-14 NOTE — DISCHARGE SUMMARY
Otolaryngology - Head and Neck Surgery  Discharge Summary    Admission Date: 12/25/2021  Discharge Date: 01/14/2022    Admission Diagnosis:   1. Larynx neoplasm malignant    2. Hemoptysis    3. Type 2 diabetes mellitus with hyperglycemia, without long-term current use of insulin    4. On enteral nutrition    5. Hyperlipidemia, unspecified hyperlipidemia type    6. Paroxysmal atrial fibrillation    7. Adverse effect of adrenal cortical steroids, sequela    8. S/P laryngectomy        Discharge Diagnosis:   1. Larynx neoplasm malignant    2. Hemoptysis    3. Type 2 diabetes mellitus with hyperglycemia, without long-term current use of insulin    4. On enteral nutrition    5. Hyperlipidemia, unspecified hyperlipidemia type    6. Paroxysmal atrial fibrillation    7. Adverse effect of adrenal cortical steroids, sequela    8. S/P laryngectomy        History of Present Illness: Cyrus Batres Jr. is a 71 y.o. 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  He recently presented with evidence of progressive disease involving the subglottis with extra laryngeal extension that had been oozing. This resulted in admission and transfusion of PRBCs. Tracheostomy was proposed as a means to decrease the drainage of blood into his lungs and perhaps decrease his coughing as he awaits definitive salvage care.        Procedures:  12/27/21  1. DL And tracheostomy  2. PEG    1/6/22  1. Diagnostic direct laryngoscopy  2. Bilateral modified neck dissection of levels 2 through 4  3. Total laryngectomy  4. Total thyroidectomy with bilateral paratracheal/upper mediastinal neck dissection  5. Autotransplantation of left inferior parathyroid into left sternocleidomastoid muscle   6. Left anterolateral thigh free flap for closure of total laryngectomy neck skin defect  7. Advancement flap for closure of left thigh donor site advanced area was 25 x 10 cm     Hospital Course: Cyrus Batres   was admitted on 12/25 for hemoptysis and airway obstruction and underwent tracheostomy. Following surgery, he was transferred to the [facility]. He remained in the [facility] until he underwent TL and reconstruction with on 1/6/22. Following TL, he was transferred to the SICU. He ICU stay was prolonged due to bed availability on the step down unit. He was stepped down to the [facility] on 1/11. His hospital stay was uneventful overall. He worked with speech therapy and was able to demonstrate proficiency in laryngectomy care and suctioning. Endocrine was consulted to manage diabetes. He was able to tolerate bolus tube feeds.  He was discharged home in stable condition on 1/14.    Consults: SICU, general surgery, PT, OT, SLP, social work, nutrition, endocrine    Medications:       Medication List      START taking these medications    calcitrioL 1 mcg/mL solution  Commonly known as: ROCALTROL  Take 0.5 mLs (0.5 mcg total) by mouth once daily.     calcium carbonate 500 mg/5 mL (1,250 mg/5 mL)  10 mLs (1,000 mg total) by Per G Tube route 3 (three) times daily. Take 1000mg of calcium three times daily for 1 week, then 1000mg twice daily x1 week, daily x 1 week, then stop.     diltiaZEM 30 MG tablet  Commonly known as: CARDIZEM  1 tablet (30 mg total) by Per G Tube route every 6 (six) hours.  Replaces: diltiaZEM 120 MG Cp24     docusate 50 mg/5 mL liquid  Commonly known as: COLACE  10 mLs (100 mg total) by Per G Tube route 2 (two) times daily.     famotidine 20 MG tablet  Commonly known as: PEPCID  1 tablet (20 mg total) by Per G Tube route 2 (two) times daily.     levothyroxine 112 MCG tablet  Commonly known as: SYNTHROID  1 tablet (112 mcg total) by Per G Tube route before breakfast. Take 4 hours before first dose of calcium and 3 hours before tube feed  Start taking on: January 15, 2022     oxyCODONE 5 mg/5 mL Soln  Commonly known as: ROXICODONE  5 mLs (5 mg total) by Per G Tube route every 6 (six) hours as needed (pain).    "  polyethylene glycol 17 gram/dose powder  Commonly known as: GLYCOLAX  17 g by Per G Tube route once daily.  Start taking on: January 15, 2022     sennosides 8.8 mg/5 ml 8.8 mg/5 mL syrup  Commonly known as: SENOKOT  5 mLs by Per G Tube route daily as needed (constipation).        CHANGE how you take these medications    ergocalciferol 50,000 unit Cap  Commonly known as: ERGOCALCIFEROL  TAKE 1 CAPSULE TWICE WEEKLY  What changed: See the new instructions.     losartan 100 MG tablet  Commonly known as: COZAAR  TAKE 1 TABLET BY MOUTH EVERY DAY  What changed: when to take this        CONTINUE taking these medications    apixaban 5 mg Tab  Commonly known as: ELIQUIS  Take 1 tablet (5 mg total) by mouth 2 (two) times daily.     aspirin 81 MG EC tablet  Commonly known as: ECOTRIN     blood sugar diagnostic Strp  Commonly known as: BLOOD GLUCOSE TEST  1 strip by Misc.(Non-Drug; Combo Route) route 2 (two) times daily before meals.     ciclopirox 0.77 % Crea  Commonly known as: LOPROX  Apply topically 2 (two) times daily.     pen needle, diabetic 32 gauge x 5/32" Ndle  Commonly known as: BD ULTRA-FINE YULISSA PEN NEEDLE  Use once weekly.        STOP taking these medications    benzonatate 200 MG capsule  Commonly known as: TESSALON     diltiaZEM 120 MG Cp24  Commonly known as: CARDIZEM CD  Replaced by: diltiaZEM 30 MG tablet     esomeprazole 20 MG capsule  Commonly known as: NEXIUM     fluticasone propionate 50 mcg/actuation nasal spray  Commonly known as: FLONASE     HYDROcodone-acetaminophen 5-325 mg per tablet  Commonly known as: NORCO     PAIN RELIEF EXTRA STRENGTH 500 MG tablet  Generic drug: acetaminophen           Where to Get Your Medications      These medications were sent to [facility]  [address] [place], [place] [address] [address]    Hours: Mon-Fri 7a-7p, Sat-Sun 10a-4p Phone: [phone]   · calcitrioL 1 mcg/mL solution  · calcium carbonate 500 mg/5 mL (1,250 mg/5 mL)  · diltiaZEM 30 MG " tablet  · docusate 50 mg/5 mL liquid  · famotidine 20 MG tablet  · levothyroxine 112 MCG tablet  · oxyCODONE 5 mg/5 mL Soln  · polyethylene glycol 17 gram/dose powder  · sennosides 8.8 mg/5 ml 8.8 mg/5 mL syrup         Disposition: home    Instructions:   Diet NPO, Glucerna tube feeds bolused   Lifting restrictions    Order Comments:  No heavy lifting (nothing greater than 20 lbs), no stooping   Call MD for: temperature >100.4    Call MD for: redness, tenderness, or signs of infection (pain, swelling, redness, odor or green/yellow discharge around incision site)    Change dressing (specify)    Order Comments:       Follow up in 1 week  Call our office at [phone] for any problems or questions

## 2022-01-14 NOTE — SUBJECTIVE & OBJECTIVE
Interval History: NAEON.     Medications:  Continuous Infusions:   dextrose 10 % in water (D10W)       Scheduled Meds:   acetaminophen  650 mg Per G Tube Q6H    ampicillin-sulbactim (UNASYN) IVPB  3 g Intravenous Q8H    apixaban  5 mg Per G Tube BID    diltiaZEM  30 mg Per G Tube Q6H    docusate  100 mg Per G Tube BID    famotidine  20 mg Per G Tube BID    levothyroxine  112 mcg Per G Tube Before breakfast    polyethylene glycol  17 g Per G Tube Daily    sennosides 8.8 mg/5 ml  5 mL Per G Tube Daily     PRN Meds:dextrose 10 % in water (D10W), dextrose 50%, glucagon (human recombinant), insulin aspart U-100, magnesium oxide, magnesium oxide, oxyCODONE     Review of patient's allergies indicates:   Allergen Reactions    Pollen extracts     Lovastatin Rash     Not confirmed but pt skeptical     Objective:     Vital Signs (24h Range):  Temp:  [97.1 °F (36.2 °C)-98.6 °F (37 °C)] 98.3 °F (36.8 °C)  Pulse:  [] 74  Resp:  [16-18] 16  SpO2:  [95 %-100 %] 95 %  BP: (118-137)/(62-73) 118/64       Lines/Drains/Airways     Drain                 Gastrostomy/Enterostomy 12/27/21 1152 Percutaneous endoscopic gastrostomy (PEG) LUQ 17 days         Closed/Suction Drain 01/06/22 1521 Right Neck Bulb 15 Fr. 7 days         Closed/Suction Drain 01/06/22 1524 Left Neck Bulb 15 Fr. 7 days          Airway                 Laryngectomy (Do Not Remove) 01/06/22 1655 Laryngectomy tube 7 days          Peripheral Intravenous Line                 Midline Catheter Insertion/Assessment  - Single Lumen 01/02/22 1410 Right basilic vein (medial side of arm) other (see comments) 11 days         Peripheral IV - Single Lumen 01/10/22 0000 20 G Anterior;Left Forearm 4 days                Physical Exam  Awake, alert, nad  EOMI  Neck - slightly more edematous and tender on right side, no erythema. Left stable.   Flap - good color/turgor, strong triphasic doppler, cap refill appropriate  GLENN neck and leg with ss output, removed.   AAOx3,  communicates with gestures    Significant Labs:  BMP:   Recent Labs   Lab 01/14/22  0425   *      CO2 29   BUN 27*   CREATININE 0.9   CALCIUM 10.3   MG 1.8     CBC:   Recent Labs   Lab 01/14/22 0425   WBC 8.88   RBC 3.10*   HGB 8.7*   HCT 26.7*      MCV 86   MCH 28.1   MCHC 32.6     Recent Lab Results       01/14/22  0425   01/14/22  0420   01/14/22  0009   01/13/22  2116   01/13/22  1807        Anion Gap 7               Baso # 0.03               Basophil % 0.3               BUN 27               Calcium 10.3               Ionized Calcium 1.12               Chloride 101               CO2 29               Creatinine 0.9               Differential Method Automated               eGFR if  >60.0               eGFR if non  >60.0  Comment: Calculation used to obtain the estimated glomerular filtration  rate (eGFR) is the CKD-EPI equation.                  Eos # 0.2               Eosinophil % 1.9               Glucose 140               Gran # (ANC) 7.3               Gran % 82.8               Hematocrit 26.7               Hemoglobin 8.7               Immature Grans (Abs) 0.10  Comment: Mild elevation in immature granulocytes is non specific and   can be seen in a variety of conditions including stress response,   acute inflammation, trauma and pregnancy. Correlation with other   laboratory and clinical findings is essential.                 Immature Granulocytes 1.1               Lymph # 0.7               Lymph % 8.2               Magnesium 1.8               MCH 28.1               MCHC 32.6               MCV 86               Mono # 0.5               Mono % 5.7               MPV 9.8               nRBC 0               Phosphorus 4.0               Platelets 383               POCT Glucose   151   133   129   151       Potassium 4.4               RBC 3.10               RDW 13.1               Sodium 137               WBC 8.88                                01/13/22  1435    01/13/22  1057   01/13/22  0907        Anion Gap           Baso #           Basophil %           BUN           Calcium           Ionized Calcium   1.54         Chloride           CO2           Creatinine           Differential Method           eGFR if            eGFR if non            Eos #           Eosinophil %           Glucose           Gran # (ANC)           Gran %           Hematocrit           Hemoglobin           Immature Grans (Abs)           Immature Granulocytes           Lymph #           Lymph %           Magnesium           MCH           MCHC           MCV           Mono #           Mono %           MPV           nRBC           Phosphorus           Platelets           POCT Glucose 156     186       Potassium           RBC           RDW           Sodium           WBC                 Significant Diagnostics:  I have reviewed and interpreted all pertinent imaging results/findings within the past 24 hours.

## 2022-01-14 NOTE — CARE UPDATE
BG goal 140-180      Remains in GISSU. POD 8. BG well controlled with minimal prn SQ insulin correction requirements. .Tube feeds per order. Diet NPO      Plan:  -Change to Low Dose Correction Scale  -BG monitoring q 6 hrs    ** Please call Endocrine for any BG related issues **    Discharge plans:  No anticipated discharge needs. Monitor BG twice daily at alternating times and notify endocrine if BG consistently >200.     Lab Results   Component Value Date    HGBA1C 5.9 (H) 01/05/2022

## 2022-01-14 NOTE — PROGRESS NOTES
[facility]  Otorhinolaryngology-Head & Neck Surgery  Progress Note    Subjective:     Post-Op Info:  Procedure(s) (LRB):  LARYNGECTOMY (N/A)  DISSECTION, NECK (Bilateral)  CREATION, FREE FLAP (Right)  THYROIDECTOMY  LARYNGOSCOPY (N/A)  REIMPLANTATION, PARATHYROID TISSUE (N/A)   8 Days Post-Op  Hospital Day: 21     Interval History: NAEON.     Medications:  Continuous Infusions:   dextrose 10 % in water (D10W)       Scheduled Meds:   acetaminophen  650 mg Per G Tube Q6H    ampicillin-sulbactim (UNASYN) IVPB  3 g Intravenous Q8H    apixaban  5 mg Per G Tube BID    diltiaZEM  30 mg Per G Tube Q6H    docusate  100 mg Per G Tube BID    famotidine  20 mg Per G Tube BID    levothyroxine  112 mcg Per G Tube Before breakfast    polyethylene glycol  17 g Per G Tube Daily    sennosides 8.8 mg/5 ml  5 mL Per G Tube Daily     PRN Meds:dextrose 10 % in water (D10W), dextrose 50%, glucagon (human recombinant), insulin aspart U-100, magnesium oxide, magnesium oxide, oxyCODONE     Review of patient's allergies indicates:   Allergen Reactions    Pollen extracts     Lovastatin Rash     Not confirmed but pt skeptical     Objective:     Vital Signs (24h Range):  Temp:  [97.1 °F (36.2 °C)-98.6 °F (37 °C)] 98.3 °F (36.8 °C)  Pulse:  [] 74  Resp:  [16-18] 16  SpO2:  [95 %-100 %] 95 %  BP: (118-137)/(62-73) 118/64       Lines/Drains/Airways     Drain                 Gastrostomy/Enterostomy 12/27/21 1152 Percutaneous endoscopic gastrostomy (PEG) LUQ 17 days         Closed/Suction Drain 01/06/22 1521 Right Neck Bulb 15 Fr. 7 days         Closed/Suction Drain 01/06/22 1524 Left Neck Bulb 15 Fr. 7 days          Airway                 Laryngectomy (Do Not Remove) 01/06/22 1655 Laryngectomy tube 7 days          Peripheral Intravenous Line                 Midline Catheter Insertion/Assessment  - Single Lumen 01/02/22 1410 Right basilic vein (medial side of arm) other (see comments) 11 days         Peripheral IV - Single  Lumen 01/10/22 0000 20 G Anterior;Left Forearm 4 days                Physical Exam  Awake, alert, nad  EOMI  Neck - slightly more edematous and tender on right side, no erythema. Left stable.   Flap - good color/turgor, strong triphasic doppler, cap refill appropriate  GLENN neck and leg with ss output, removed.   AAOx3, communicates with gestures    Significant Labs:  BMP:   Recent Labs   Lab 01/14/22 0425   *      CO2 29   BUN 27*   CREATININE 0.9   CALCIUM 10.3   MG 1.8     CBC:   Recent Labs   Lab 01/14/22 0425   WBC 8.88   RBC 3.10*   HGB 8.7*   HCT 26.7*      MCV 86   MCH 28.1   MCHC 32.6     Recent Lab Results       01/14/22  0425   01/14/22  0420   01/14/22  0009   01/13/22  2116   01/13/22  1807        Anion Gap 7               Baso # 0.03               Basophil % 0.3               BUN 27               Calcium 10.3               Ionized Calcium 1.12               Chloride 101               CO2 29               Creatinine 0.9               Differential Method Automated               eGFR if  >60.0               eGFR if non  >60.0  Comment: Calculation used to obtain the estimated glomerular filtration  rate (eGFR) is the CKD-EPI equation.                  Eos # 0.2               Eosinophil % 1.9               Glucose 140               Gran # (ANC) 7.3               Gran % 82.8               Hematocrit 26.7               Hemoglobin 8.7               Immature Grans (Abs) 0.10  Comment: Mild elevation in immature granulocytes is non specific and   can be seen in a variety of conditions including stress response,   acute inflammation, trauma and pregnancy. Correlation with other   laboratory and clinical findings is essential.                 Immature Granulocytes 1.1               Lymph # 0.7               Lymph % 8.2               Magnesium 1.8               MCH 28.1               MCHC 32.6               MCV 86               Mono # 0.5               Mono %  5.7               MPV 9.8               nRBC 0               Phosphorus 4.0               Platelets 383               POCT Glucose   151   133   129   151       Potassium 4.4               RBC 3.10               RDW 13.1               Sodium 137               WBC 8.88                                01/13/22  1435   01/13/22  1057   01/13/22  0907        Anion Gap           Baso #           Basophil %           BUN           Calcium           Ionized Calcium   1.54         Chloride           CO2           Creatinine           Differential Method           eGFR if            eGFR if non            Eos #           Eosinophil %           Glucose           Gran # (ANC)           Gran %           Hematocrit           Hemoglobin           Immature Grans (Abs)           Immature Granulocytes           Lymph #           Lymph %           Magnesium           MCH           MCHC           MCV           Mono #           Mono %           MPV           nRBC           Phosphorus           Platelets           POCT Glucose 156     186       Potassium           RBC           RDW           Sodium           WBC                 Significant Diagnostics:  I have reviewed and interpreted all pertinent imaging results/findings within the past 24 hours.    Assessment/Plan:     * Larynx neoplasm malignant    Continue q4h flap checks: Record Doppler Pulses (artery and vein) ,Capillary Refill , Color, Turgor, temperature.   - Neurovascular Checks q4h of bilateral upper and lower extremities   - Keep head elevated and in neutral position  - FOR ANY FLAP ISSUES, PLEASE PHONE ENT RESIDENT ON CALL.  - Please label drains carefully and document accordingly  - Maintain xeroform in left ear canal  - Bacitracin twice daily to incision line     Neuro:  - Alert, oriented. Pain medication PRN.      CV:  - HDS. SBP > 90. A-line can be removed  - continue with q1h flap checks     Pulm:  - stable     GI/FEN/Lytes:  - Strict  NPO  - monitor calcium closely and replete prn.   - bolus TFs today  - replace lytes prn     Renal:  - UOP adequate. Juarez out.      Heme/ID:  - cont to trend HH  - cont Unasyn     Endo:  - Q6 acuchecks, SSI.      PPX:  PT/OT  L40  PPI     Dispo:   Continue hospital care. Poss dc end of this week                    Hypocalcemia  Post op PTH low. Calcium now stable on calcitriol and calcium carbonate. Will monitor labs.    S/P laryngectomy  Doing well. Laryngectomy tube in place with HME. Patient is proficient in suctioning and stoma care.  Planning to increase tube feeds to bolus and discharge at end of week.  Home tube feeds and suction supplies ordered.  Will follow up outpatient with speech therapy.     On enteral nutrition  Continue tube feeds, will advance to bolus prior to discharge.    Type 2 diabetes mellitus with hyperglycemia, without long-term current use of insulin  Sliding scale insulin and accuchecks. Endocrine consulted.    Hyperlipidemia  Resume home meds.    Paroxysmal atrial fibrillation  Vital signs have been stable.   Continue home Diltiazem 30mg q6h,  Restarted home Eliquis.        Yo Herrera MD  Otorhinolaryngology-Head & Neck Surgery  [facility]

## 2022-01-14 NOTE — PT/OT/SLP PROGRESS
[facility] - Surgical Intensive Care  Total Laryngectomy Treatment Note    Patient Name:  Cyrus Batres Jr.   MRN:  [id number]  Admitting Diagnosis: Larynx neoplasm malignant    PATIENT IS A NECK-BREATHER - DO NOT ORALLY INTUBATE    Recommendations:                 General Recommendations:  post-op education  Diet recommendations:  Patient NPO until cleared by ENT  General Precautions: Standard, aspiration,fall,respiratory    Communication:      Communication strategies: Eyeglasses, Provide increased time to answer, Go to room if call light pushed, Encourage use of communication device (AL) and Whiteboard/Paper-pencil provided   Patient communicating all wants and needs? yes   Supplies labeled with patient information?  yes   RN notified if supplies brought to patient's room?  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       Past Surgical History:   Procedure Laterality Date    DIRECT LARYNGOBRONCHOSCOPY N/A 12/27/2021    Procedure: LARYNGOSCOPY, DIRECT, WITH BRONCHOSCOPY;  Surgeon: Flex Espinosa MD;  Location: [address];  Service: ENT;  Laterality: N/A;    DISSECTION OF NECK Bilateral 1/6/2022    Procedure: DISSECTION, NECK;  Surgeon: Jesse James MD;  Location: [address];  Service: ENT;  Laterality: Bilateral;    FLAP PROCEDURE Right 1/6/2022    Procedure: CREATION, FREE FLAP;  Surgeon: Alise Hart MD;  Location: [address];  Service: ENT;  Laterality: Right;  Ischemic start 1351  Ischemic stop 1502    LARYNGECTOMY N/A 1/6/2022    Procedure: LARYNGECTOMY;  Surgeon: Jesse James MD;  Location: [address];  Service: ENT;  Laterality: N/A;    LARYNGOSCOPY N/A 8/4/2020    Procedure: Suspension microlaryngoscopy with biopsy, possible KTP laser treatment/excision;  Surgeon: Stew AMOS  MD Bert;  [facility];  Service: ENT;  Laterality: N/A;  Microscope, telescopes, tower, microinstruments, KTP laser, rep conf# [id number] IC 7/28.    LARYNGOSCOPY N/A 3/16/2021    Procedure: Suspension microlaryngoscopy with excision of lesion, possible CO2 laser;  Surgeon: Stew Noel MD;  [facility];  Service: ENT;  Laterality: N/A;  Microscope, telescopes, tower, microinstruments, CO2 laser, rep conf# [id number] IC 3/4.    LARYNGOSCOPY N/A 4/1/2021    Procedure: Suspension microlaryngoscopy with KTP laser excision of lesion;  Surgeon: Stew Noel MD;  [facility];  Service: ENT;  Laterality: N/A;  Microscope, telescopes, tower, microinstruments, 70 degree scope, vocal fold , KTP laser, rep conf# [id number] BC    LARYNGOSCOPY N/A 12/9/2021    Procedure: Suspension microlaryngoscopy with biopsy;  Surgeon: Stew Noel MD;  [facility];  Service: ENT;  Laterality: N/A;  Microscope, telescopes, tower, microinstruments    LARYNGOSCOPY N/A 1/6/2022    Procedure: LARYNGOSCOPY;  Surgeon: Jesse James MD;  Location: [facility] OR [address] [place] [address];  Service: ENT;  Laterality: N/A;    MICROLARYNGOSCOPY N/A 3/17/2020    Procedure: MICROLARYNGOSCOPY;  Surgeon: Jung Xiao MD;  Location: [facility];  Service: ENT;  Laterality: N/A;  Laser Microlaryngoscopy  NEED TO SCHEDULE LASER from [facility] [employer] [id number]    REIMPLANTATION OF PARATHYROID TISSUE N/A 1/6/2022    Procedure: REIMPLANTATION, PARATHYROID TISSUE;  Surgeon: Jesse James MD;  [facility];  Service: ENT;  Laterality: N/A;    THYROIDECTOMY  1/6/2022    Procedure: THYROIDECTOMY;  Surgeon: Jesse James MD;  [facility];  Service: ENT;;    TRACHEOSTOMY N/A 12/27/2021    Procedure: CREATION, TRACHEOSTOMY;  Surgeon: Flex Espinosa MD;  Location: [facility] OR [address] [place] [address];  Service: ENT;  Laterality: N/A;       Date of Surgery: 1/6/22    The patient  "received a total laryngectomy with bilateral neck dissection.  PEG ?  yes  TEP placed at time of surgery?  no    Subjective:     Awake/alert    Pain/Comfort:  Pain Rating 1: 0/10  Pain Rating Post-Intervention 1: 0/10  Location 2: abdomen  Pain Addressed 2: Reposition,Distraction    Respiratory Status: Room air    Objective:     Communication:  · Gestures/Facial Expressions  · Mouthing Words  · Artificial Larynx Trials:  Yes  Placement:  Able to achieve independently  · Timing:  Using appropriately with short phrases  · Intelligibility:  Phrase 90%    Additional Treatment:  Pt repositioned upright in bed for ongoing education. Cheli tube with HME in place throughout session.Pt recalled items and lifespan of items with 100% accy. He feels comfortable completing stoma care ind'ly. Continue POC at this time, pt with anticipated discharge date today.     Education:     General Post-Operative Education Provided:  · SLP discussed:   Anatomical changes that will occur in respiration, communication, coughing, sneezing, blowing nose, and swallowing   · Timelines of swallowing assessments and progression of textures were detailed  · Patient will be a total neck breather and that in the event of emergency, oxygen must be provided to stoma   · Patient advised to contact local fire department to update emergency personnel that patient will be a "total neck breather"   · Educated on stoma care and how to perform   · Use and benefits of heat and moisture exchanges (HMEs) to provide humidity to stoma   · Basic surgical course and recovery  · Expectations for follow up and discharge   · Lifestyle changes, including importance of avoiding water to stoma provided.  · SLP demonstrated use of an artificial larynx (AL) on neck and external cheek. Discussed AL speech with an oral adaptor.  · Patient engaged in education and demonstrating adequate understanding of all topics reviewed.    Alternate Airway Precautions in Place:  · All " laryngectomy precautions present: Orange signs above HOB and on door, communication impaired wristband, Pediatric ambu bag mask (size 3)  ·  notified patient has laryngectomy and to call RN immediately when patient presses call bell.  · RN notified to enter room immediately when called to attend patient.    Impressions:  · Patient would benefit from ongoing education/support of  Post-operative anatomy, physical and functional limitations  · Permanency of thacheostoma  · Contents of post-laryngectomy kit  · The ability to perform Stoma care.    Goals:   Multidisciplinary Problems     SLP Goals        Problem: SLP Goal    Goal Priority Disciplines Outcome   SLP Goal     SLP Ongoing, Progressing   Description: Speech Language Pathology Goals  Goals expected to be met by 1/15    1. Pt/family will actively participate in post-laryngectomy education to facilitate [place] and desensitization  2. Pt will communicate contextualized phrases via AL with approximately 60% intelligibility provided min cues to improve communication.  3. Pt/family will perform stoma care x1 per session provided min cues to facilitate [place].                           Plan:     · Patient to be seen:  5 x/week   · Plan of Care expires:  02/06/22  · Plan of Care reviewed with:  patient   · SLP Follow-Up:  Yes       Discharge recommendations:  home       Time Tracking:     SLP Treatment Date:   01/14/22  Speech Start Time:  0955  Speech Stop Time:  1001     Speech Total Time (min):  6 min    Billable Minutes: Speech Therapy Individual 6

## 2022-01-14 NOTE — DISCHARGE INSTRUCTIONS
Diet: nothing to eat or drink by mouth. Use PEG tube for Glucerna tube feeds and all medications. Flush with water before and after each feed and with medications.    Activity: light activity such as walking is great, do not lift anything greater than 10lb.  OK to shower, but keep drain site dry.    Will check calcium labs Tuesday.  Follow up in clinic Friday.    Message or have someone call our office for any questions or problems. If you need anything after hours or over the weekend or holiday, have someone call and ask for the ENT resident on call.

## 2022-01-15 ENCOUNTER — PATIENT MESSAGE (OUTPATIENT)
Dept: SURGERY | Facility: HOSPITAL | Age: 72
End: 2022-01-15
Payer: MEDICARE

## 2022-01-15 NOTE — PLAN OF CARE
[facility] - [facility]  Discharge Final Note    Primary Care Provider: Diana Calhoun MD    Expected Discharge Date: 1/14/2022    Final Discharge Note (most recent)     Final Note - 01/14/22 1803        Final Note    Assessment Type Final Discharge Note     Anticipated Discharge Disposition IV Therapy Provider        Post-Acute Status    Post-Acute Authorization HME     Post-Acute Placement Status Set-up Complete/Auth obtained     HME Status Set-up Complete/Auth obtained                 Important Message from Medicare  Important Message from Medicare regarding Discharge Appeal Rights: Given to patient/caregiver,Explained to patient/caregiver,Signed/date by patient/caregiver     Date IMM was signed: 01/13/22  Time IMM was signed: 0950       Future Appointments   Date Time Provider Department Center   1/17/2022  1:30 PM [facility] RAD ONC, PHYSICIAN SCHED [facility] RAD ONC [facility] [place]   1/18/2022 11:25 AM LAB, [facility]   1/20/2022  8:15 AM [place] Alessandra Pham, OD 2C [facility] [place]   1/20/2022 11:00 AM Fariha Muñoz, NP [facility] HNSO [facility]   1/20/2022 11:00 AM Natalia Oh, CCC-SLP [facility] SPPATH[facility] [place]   2/1/2022  1:40 PM [facility]   2/2/2022  2:00 PM [facility]   2/14/2022  8:00 AM LAB, [facility] LAB [place]   2/14/2022  8:15 AM SPECIMEN, [facility] SPECLAB [place]   2/21/2022  2:30 PM MICHELLE Silveira PA-C [facility] ENDOCRN [place]

## 2022-01-18 ENCOUNTER — PATIENT OUTREACH (OUTPATIENT)
Dept: ADMINISTRATIVE | Facility: CLINIC | Age: 72
End: 2022-01-18
Payer: MEDICARE

## 2022-01-18 ENCOUNTER — PATIENT MESSAGE (OUTPATIENT)
Dept: OTOLARYNGOLOGY | Facility: CLINIC | Age: 72
End: 2022-01-18
Payer: MEDICARE

## 2022-01-18 ENCOUNTER — LAB VISIT (OUTPATIENT)
Dept: LAB | Facility: HOSPITAL | Age: 72
End: 2022-01-18
Attending: NURSE PRACTITIONER
Payer: MEDICARE

## 2022-01-18 DIAGNOSIS — E83.51 HYPOCALCEMIA: ICD-10-CM

## 2022-01-18 DIAGNOSIS — E83.51 HYPOCALCEMIA: Primary | ICD-10-CM

## 2022-01-18 DIAGNOSIS — Z90.02 S/P LARYNGECTOMY: ICD-10-CM

## 2022-01-18 LAB
CA-I BLDV-SCNC: 1.41 MMOL/L (ref 1.06–1.42)
CALCIUM SERPL-MCNC: 10.7 MG/DL (ref 8.7–10.5)
FINAL PATHOLOGIC DIAGNOSIS: NORMAL
FROZEN SECTION DIAGNOSIS: NORMAL
FROZEN SECTION FOOTNOTE: NORMAL
GROSS: NORMAL
Lab: NORMAL
PTH-INTACT SERPL-MCNC: <5 PG/ML (ref 9–77)

## 2022-01-18 PROCEDURE — 82310 ASSAY OF CALCIUM: CPT | Mod: HCNC | Performed by: NURSE PRACTITIONER

## 2022-01-18 PROCEDURE — 82330 ASSAY OF CALCIUM: CPT | Mod: HCNC | Performed by: NURSE PRACTITIONER

## 2022-01-18 PROCEDURE — 36415 COLL VENOUS BLD VENIPUNCTURE: CPT | Mod: HCNC | Performed by: NURSE PRACTITIONER

## 2022-01-18 PROCEDURE — 83970 ASSAY OF PARATHORMONE: CPT | Mod: HCNC | Performed by: NURSE PRACTITIONER

## 2022-01-19 DIAGNOSIS — Z12.11 COLON CANCER SCREENING: ICD-10-CM

## 2022-01-19 NOTE — PHYSICIAN QUERY
PT Name: Cyrus Batres Jr.  MR #: [id number]    DOCUMENTATION CLARIFICATION     CDS/: Ana ABREU, RN             Contact information: [email or web address]  This form is a permanent document in the medical record.     Query Date: January 19, 2022    By submitting this query, we are merely seeking further clarification of documentation.  Please utilize your independent clinical judgment when addressing the question(s) below.    The medical record contains the following:  Pathology Findings Location in Medical Record      Left lobe of thyroid gland, positive for invasive squamous cell carcinoma     Pathology Diagnosis: collected 1/6/22       Please clarify Left lobe of thyroid gland, positive for invasive squamous cell carcinoma :    [ x ]  Pathology findings noted above are ruled in/confirmed as diagnoses     [  ]  Pathology findings noted above are not confirmed as diagnoses     [  ]  Pathology findings noted above are incidental     [  ]  Other diagnosis (please specify): ___________     [  ]  Clinically Undetermined       Please document in your progress notes daily for the duration of treatment until resolved and include in your discharge summary.    Form No. [id number]

## 2022-01-20 ENCOUNTER — CLINICAL SUPPORT (OUTPATIENT)
Dept: SPEECH THERAPY | Facility: HOSPITAL | Age: 72
End: 2022-01-20
Payer: MEDICARE

## 2022-01-20 ENCOUNTER — LAB VISIT (OUTPATIENT)
Dept: LAB | Facility: HOSPITAL | Age: 72
End: 2022-01-20
Payer: MEDICARE

## 2022-01-20 ENCOUNTER — OFFICE VISIT (OUTPATIENT)
Dept: OTOLARYNGOLOGY | Facility: CLINIC | Age: 72
End: 2022-01-20
Payer: MEDICARE

## 2022-01-20 VITALS
SYSTOLIC BLOOD PRESSURE: 104 MMHG | HEART RATE: 92 BPM | BODY MASS INDEX: 21.63 KG/M2 | WEIGHT: 134 LBS | DIASTOLIC BLOOD PRESSURE: 67 MMHG

## 2022-01-20 DIAGNOSIS — Z90.02 S/P LARYNGECTOMY: Primary | ICD-10-CM

## 2022-01-20 DIAGNOSIS — C32.9 LARYNX NEOPLASM MALIGNANT: ICD-10-CM

## 2022-01-20 DIAGNOSIS — C32.9 LARYNX CANCER: ICD-10-CM

## 2022-01-20 DIAGNOSIS — E83.51 HYPOCALCEMIA: ICD-10-CM

## 2022-01-20 DIAGNOSIS — R49.0 ALARYNGEAL VOICE: ICD-10-CM

## 2022-01-20 DIAGNOSIS — R49.1 APHONIA: ICD-10-CM

## 2022-01-20 DIAGNOSIS — Z90.02 S/P LARYNGECTOMY: ICD-10-CM

## 2022-01-20 LAB
CA-I BLDV-SCNC: 1.39 MMOL/L (ref 1.06–1.42)
CALCIUM SERPL-MCNC: 10.7 MG/DL (ref 8.7–10.5)
PTH-INTACT SERPL-MCNC: <5 PG/ML (ref 9–77)

## 2022-01-20 PROCEDURE — 83970 ASSAY OF PARATHORMONE: CPT | Mod: HCNC | Performed by: NURSE PRACTITIONER

## 2022-01-20 PROCEDURE — 3044F PR MOST RECENT HEMOGLOBIN A1C LEVEL <7.0%: ICD-10-PCS | Mod: HCNC,CPTII,S$GLB, | Performed by: NURSE PRACTITIONER

## 2022-01-20 PROCEDURE — 3008F BODY MASS INDEX DOCD: CPT | Mod: HCNC,CPTII,S$GLB, | Performed by: NURSE PRACTITIONER

## 2022-01-20 PROCEDURE — 3288F PR FALLS RISK ASSESSMENT DOCUMENTED: ICD-10-PCS | Mod: HCNC,CPTII,S$GLB, | Performed by: NURSE PRACTITIONER

## 2022-01-20 PROCEDURE — 99024 POSTOP FOLLOW-UP VISIT: CPT | Mod: HCNC,S$GLB,, | Performed by: NURSE PRACTITIONER

## 2022-01-20 PROCEDURE — 3074F SYST BP LT 130 MM HG: CPT | Mod: HCNC,CPTII,S$GLB, | Performed by: NURSE PRACTITIONER

## 2022-01-20 PROCEDURE — 3008F PR BODY MASS INDEX (BMI) DOCUMENTED: ICD-10-PCS | Mod: HCNC,CPTII,S$GLB, | Performed by: NURSE PRACTITIONER

## 2022-01-20 PROCEDURE — 3078F DIAST BP <80 MM HG: CPT | Mod: HCNC,CPTII,S$GLB, | Performed by: NURSE PRACTITIONER

## 2022-01-20 PROCEDURE — 99499 RISK ADDL DX/OHS AUDIT: ICD-10-PCS | Mod: S$GLB,,, | Performed by: NURSE PRACTITIONER

## 2022-01-20 PROCEDURE — 99499 UNLISTED E&M SERVICE: CPT | Mod: S$GLB,,, | Performed by: NURSE PRACTITIONER

## 2022-01-20 PROCEDURE — 3074F PR MOST RECENT SYSTOLIC BLOOD PRESSURE < 130 MM HG: ICD-10-PCS | Mod: HCNC,CPTII,S$GLB, | Performed by: NURSE PRACTITIONER

## 2022-01-20 PROCEDURE — 1159F PR MEDICATION LIST DOCUMENTED IN MEDICAL RECORD: ICD-10-PCS | Mod: HCNC,CPTII,S$GLB, | Performed by: NURSE PRACTITIONER

## 2022-01-20 PROCEDURE — 3078F PR MOST RECENT DIASTOLIC BLOOD PRESSURE < 80 MM HG: ICD-10-PCS | Mod: HCNC,CPTII,S$GLB, | Performed by: NURSE PRACTITIONER

## 2022-01-20 PROCEDURE — 1101F PR PT FALLS ASSESS DOC 0-1 FALLS W/OUT INJ PAST YR: ICD-10-PCS | Mod: HCNC,CPTII,S$GLB, | Performed by: NURSE PRACTITIONER

## 2022-01-20 PROCEDURE — 82330 ASSAY OF CALCIUM: CPT | Mod: HCNC | Performed by: NURSE PRACTITIONER

## 2022-01-20 PROCEDURE — 99024 PR POST-OP FOLLOW-UP VISIT: ICD-10-PCS | Mod: HCNC,S$GLB,, | Performed by: NURSE PRACTITIONER

## 2022-01-20 PROCEDURE — 1159F MED LIST DOCD IN RCRD: CPT | Mod: HCNC,CPTII,S$GLB, | Performed by: NURSE PRACTITIONER

## 2022-01-20 PROCEDURE — 1126F PR PAIN SEVERITY QUANTIFIED, NO PAIN PRESENT: ICD-10-PCS | Mod: HCNC,CPTII,S$GLB, | Performed by: NURSE PRACTITIONER

## 2022-01-20 PROCEDURE — 99999 PR PBB SHADOW E&M-EST. PATIENT-LVL III: CPT | Mod: PBBFAC,HCNC,, | Performed by: NURSE PRACTITIONER

## 2022-01-20 PROCEDURE — 1101F PT FALLS ASSESS-DOCD LE1/YR: CPT | Mod: HCNC,CPTII,S$GLB, | Performed by: NURSE PRACTITIONER

## 2022-01-20 PROCEDURE — 92507 TX SP LANG VOICE COMM INDIV: CPT | Mod: GN,HCNC

## 2022-01-20 PROCEDURE — 1126F AMNT PAIN NOTED NONE PRSNT: CPT | Mod: HCNC,CPTII,S$GLB, | Performed by: NURSE PRACTITIONER

## 2022-01-20 PROCEDURE — 82310 ASSAY OF CALCIUM: CPT | Mod: HCNC | Performed by: NURSE PRACTITIONER

## 2022-01-20 PROCEDURE — 36415 COLL VENOUS BLD VENIPUNCTURE: CPT | Mod: HCNC | Performed by: NURSE PRACTITIONER

## 2022-01-20 PROCEDURE — 99999 PR PBB SHADOW E&M-EST. PATIENT-LVL III: ICD-10-PCS | Mod: PBBFAC,HCNC,, | Performed by: NURSE PRACTITIONER

## 2022-01-20 PROCEDURE — 3288F FALL RISK ASSESSMENT DOCD: CPT | Mod: HCNC,CPTII,S$GLB, | Performed by: NURSE PRACTITIONER

## 2022-01-20 PROCEDURE — 3044F HG A1C LEVEL LT 7.0%: CPT | Mod: HCNC,CPTII,S$GLB, | Performed by: NURSE PRACTITIONER

## 2022-01-20 NOTE — PROGRESS NOTES
Chief Complaint   Patient presents with    Post-op Evaluation     Oncology History   Larynx neoplasm malignant   8/4/2020 Initial Diagnosis    Larynx neoplasm malignant     8/6/2020 Tumor Conference    His case was discussed at the Multidisciplinary Head and Neck Team Planning Meeting.    Representatives from Medical Oncology, Radiation Oncology, Head and Neck Surgical Oncology, Psychosocial Oncology, and Speech and Language Pathology discussed the case with the following recommendations:    1) definitive radiation              8/6/2020 Cancer Staged    Staging form: Larynx - Glottis, AJCC 8th Edition  - Clinical stage from 8/6/2020: Stage II (cT2, cN0, cM0)     8/6/2020 Cancer Staged    Cancer Staging  Larynx neoplasm malignant  Staging form: Larynx - Glottis, AJCC 8th Edition  - Clinical stage from 8/6/2020: Stage II (cT2, cN0, cM0) - Signed by Fariha Muñoz NP on 8/6/2020 12/16/2021 Tumor Conference    His case was discussed at the Multidisciplinary Head and Neck Team Planning Meeting.    Representatives from Medical Oncology, Radiation Oncology, Head and Neck Surgical Oncology, Psychosocial Oncology, and Speech and Language Pathology discussed the case with the following recommendations:    1) TL  2) oncology referral  3) psychology referral            12/27/2021 Surgery    1. DL And tracheostomy  2. PEG     1/6/2022 Surgery    1. Diagnostic direct laryngoscopy  2. Bilateral modified neck dissection of levels 2 through 4  3. Total laryngectomy  4. Total thyroidectomy with bilateral paratracheal/upper mediastinal neck dissection  5. Autotransplantation of left inferior parathyroid into left sternocleidomastoid muscle   6. Left anterolateral thigh free flap for closure of total laryngectomy neck skin defect  7. Advancement flap for closure of left thigh donor site advanced area was 25 x 10 cm     1/20/2022 Cancer Staged    Staging form: Larynx - Glottis, AJCC 8th Edition  - Pathologic stage from  1/20/2022: Stage LOU (pT4a, pN0, cM0)           HPI   71 y.o. male  Returns for a post op visit. He has been doing well since leaving the hospital. He has minimal pain. He has no fever. He is tolerating tube feeds. no complaints.    Review of Systems   Constitutional: Negative for fatigue and unexpected weight change.   HENT: Per HPI.  Eyes: Negative for visual disturbance.   Respiratory: Negative for shortness of breath, hemoptysis   Cardiovascular: Negative for chest pain and palpitations.   Musculoskeletal: Negative for decreased ROM, back pain.   Skin: Negative for rash, sunburn, itching.   Neurological: Negative for dizziness and seizures.   Hematological: Negative for adenopathy. Does not bruise/bleed easily.   Endocrine: Negative for rapid weight loss/weight gain, heat/cold intolerance.     Past Medical History   Patient Active Problem List   Diagnosis    Melanocytic nevus    Body mass index (BMI) of 29.0-29.9 in adult    Benign hypertension    Overweight    Paroxysmal atrial fibrillation    Type 2 diabetes mellitus with diabetic arthropathy, with long-term current use of insulin    Fatty liver disease, nonalcoholic    False positive serological test for hepatitis C    Hyperlipidemia    Type 2 diabetes mellitus with hyperglycemia, without long-term current use of insulin    Gastroesophageal reflux disease without esophagitis    Type 2 diabetes mellitus with hyperglycemia    Vitamin B12 deficiency    Hyperuricemia    Hypovitaminosis D    Proteinuria    On enteral nutrition    Larynx neoplasm malignant    Dehydration    NSVT (nonsustained ventricular tachycardia)    Adverse effect of adrenal cortical steroids, sequela    S/P laryngectomy    Hypocalcemia           Past Surgical History   Past Surgical History:   Procedure Laterality Date    DIRECT LARYNGOBRONCHOSCOPY N/A 12/27/2021    Procedure: LARYNGOSCOPY, DIRECT, WITH BRONCHOSCOPY;  Surgeon: Flex Espinosa MD;  Location: [address]  [facility]R;  Service: ENT;  Laterality: N/A;    DISSECTION OF NECK Bilateral 1/6/2022    Procedure: DISSECTION, NECK;  Surgeon: Jesse James MD;  [facility]: [facility];  Service: ENT;  Laterality: Bilateral;    FLAP PROCEDURE Right 1/6/2022    Procedure: CREATION, FREE FLAP;  Surgeon: Alise Hart MD;  Location: [facility];  Service: ENT;  Laterality: Right;  Ischemic start 1351  Ischemic stop 1502    LARYNGECTOMY N/A 1/6/2022    Procedure: LARYNGECTOMY;  Surgeon: Jesse James MD;  Location: [facility];  Service: ENT;  Laterality: N/A;    LARYNGOSCOPY N/A 8/4/2020    Procedure: Suspension microlaryngoscopy with biopsy, possible KTP laser treatment/excision;  Surgeon: Stew Noel MD;  [facility];  Service: ENT;  Laterality: N/A;  Microscope, telescopes, tower, microinstruments, KTP laser, rep conf# [id number] IC 7/28.    LARYNGOSCOPY N/A 3/16/2021    Procedure: Suspension microlaryngoscopy with excision of lesion, possible CO2 laser;  Surgeon: Stew Noel MD;  [facility];  Service: ENT;  Laterality: N/A;  Microscope, telescopes, tower, microinstruments, CO2 laser, rep conf# [id number] IC 3/4.    LARYNGOSCOPY N/A 4/1/2021    Procedure: Suspension microlaryngoscopy with KTP laser excision of lesion;  Surgeon: Stew Noel MD;  [facility];  Service: ENT;  Laterality: N/A;  Microscope, telescopes, tower, microinstruments, 70 degree scope, vocal fold , KTP laser, rep conf# [id number] BC    LARYNGOSCOPY N/A 12/9/2021    Procedure: Suspension microlaryngoscopy with biopsy;  Surgeon: Stew Noel MD;  [facility];  Service: ENT;  Laterality: N/A;  Microscope, telescopes, tower, microinstruments    LARYNGOSCOPY N/A 1/6/2022    Procedure: LARYNGOSCOPY;  Surgeon: Jesse James MD;  Location: [facility] OR [address] [place] [address];  Service: ENT;  Laterality: N/A;    MICROLARYNGOSCOPY N/A 3/17/2020    Procedure:  MICROLARYNGOSCOPY;  Surgeon: Jung Xiao MD;  Location: [facility] OR;  Service: ENT;  Laterality: N/A;  Laser Microlaryngoscopy  [id number]    REIMPLANTATION OF PARATHYROID TISSUE N/A 1/6/2022    Procedure: REIMPLANTATION, PARATHYROID TISSUE;  Surgeon: Jesse James MD;  Location: [facility];  Service: ENT;  Laterality: N/A;    THYROIDECTOMY  1/6/2022    Procedure: THYROIDECTOMY;  Surgeon: Jesse James MD;  Location: [facility];  Service: ENT;;    TRACHEOSTOMY N/A 12/27/2021    Procedure: CREATION, TRACHEOSTOMY;  Surgeon: Flex Espinosa MD;  [facility];  Service: ENT;  Laterality: N/A;         Family History   Family History   Problem Relation Age of Onset    Abnormal EKG Mother     Diabetes Father     Heart disease Father     Hypertension Father            Social History   .  Social History     Socioeconomic History    Marital status:      Spouse name: Janel Batres    Number of children: 2   Occupational History    Occupation: [employer]     Employer: [employer]   Tobacco Use    Smoking status: Never Smoker    Smokeless tobacco: Never Used   Substance and Sexual Activity    Alcohol use: Not Currently     Comment: occasional    Drug use: No    Sexual activity: Yes     Partners: Female   Social History Narrative    2 children from his 1st wife         Allergies   Review of patient's allergies indicates:   Allergen Reactions    Pollen extracts     Lovastatin Rash     Not confirmed but pt skeptical           Physical Exam     Vitals:    01/20/22 1043   BP: 104/67   Pulse: 92         Body mass index is 21.63 kg/m².      General: AOx3, NAD   Respiratory:  Symmetric chest rise, normal effort  Oral Cavity:  Oral Tongue mobile, no lesions noted. Hard Palate WNL. No buccal or FOM lesions.  Oropharynx:  No masses/lesions of the posterior pharyngeal wall. Tonsillar fossa without lesions. Soft palate without masses.  Midline uvula.   Neck: Stoma widely patent. Skin paddle healthy with good color and turgor. Incision. CDI.  No redness, drainage, or fluid collection noted.  Edges well approximated. Staples intact.  Face: House Brackmann I bilaterally.       Assessment/Plan  Problem List Items Addressed This Visit        ENT    S/P laryngectomy - Primary    Relevant Orders    PTH, Intact    CALCIUM, IONIZED    Calcium    Ambulatory referral/consult to Hematology/Oncology/Nutrition       Oncology    Larynx neoplasm malignant     He is healing well. Questions answered. Will remove leg and neck staples in 1 week.          Relevant Orders    Ambulatory referral/consult to Hematology/Oncology/Nutrition

## 2022-01-20 NOTE — PROGRESS NOTES
"DIAGNOSIS:  Alaryngeal voice (R49.0), s/p laryngectomy (Z90.02), History laryngeal cancer (Z85.21), History head and neck radiation (Z92.3) and History chemotherapy (Z92.21)  REFERRING DOCTOR:  Jesse James M.D., Head and Neck Surgical Oncologist    REASON FOR VISIT:   Cyrus Batres Jr. returned to clinic today for electrolarynx train, obtaining supplies, stoma care.  He was un/accompanied by his wife. He states he has gained a few lbs since hospital discharge. He is currently NPO, relying on feeding tube for nutrition. He describes gurgling sound after tube feeds, can taste the formula. He is using 4-5 cans a day.  "Doesn't feel full or hungry." 1 carton at a time with water, upright endorses reflux even if he stays upright for an hour. "Food water and medicines go down initially and then come back up" kind of regurgitate into the tube and it won't clear. He and wife are managing stoma well, he is wearing Larytube and HMEs.    Oncology History   Larynx neoplasm malignant   8/4/2020 Initial Diagnosis    Larynx neoplasm malignant     8/6/2020 Tumor Conference    His case was discussed at the Multidisciplinary Head and Neck Team Planning Meeting.    Representatives from Medical Oncology, Radiation Oncology, Head and Neck Surgical Oncology, Psychosocial Oncology, and Speech and Language Pathology discussed the case with the following recommendations:    1) definitive radiation              8/6/2020 Cancer Staged    Staging form: Larynx - Glottis, AJCC 8th Edition  - Clinical stage from 8/6/2020: Stage II (cT2, cN0, cM0)     8/6/2020 Cancer Staged    Cancer Staging  Larynx neoplasm malignant  Staging form: Larynx - Glottis, AJCC 8th Edition  - Clinical stage from 8/6/2020: Stage II (cT2, cN0, cM0) - Signed by Fariha Muñoz NP on 8/6/2020 12/16/2021 Tumor Conference    His case was discussed at the Multidisciplinary Head and Neck Team Planning Meeting.    Representatives from Medical Oncology, Radiation " Oncology, Head and Neck Surgical Oncology, Psychosocial Oncology, and Speech and Language Pathology discussed the case with the following recommendations:    1) TL  2) oncology referral  3) psychology referral            1/20/2022 Cancer Staged    Staging form: Larynx - Glottis, AJCC 8th Edition  - Pathologic stage from 1/20/2022: Stage LOU (pT4a, pN0, cM0)         MEDICAL/SURGICAL HISTORY:  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       Past Surgical History:   Procedure Laterality Date    DIRECT LARYNGOBRONCHOSCOPY N/A 12/27/2021    Procedure: LARYNGOSCOPY, DIRECT, WITH BRONCHOSCOPY;  Surgeon: Flex Espinosa MD;  Location: [facility] OR [address] [place] [address];  Service: ENT;  Laterality: N/A;    DISSECTION OF NECK Bilateral 1/6/2022    Procedure: DISSECTION, NECK;  Surgeon: Jesse James MD;  Location: [facility] OR [address] [place] [address];  Service: ENT;  Laterality: Bilateral;    FLAP PROCEDURE Right 1/6/2022    Procedure: CREATION, FREE FLAP;  Surgeon: Alise Hart MD;  [facility];  Service: ENT;  Laterality: Right;  Ischemic start 1351  Ischemic stop 1502    LARYNGECTOMY N/A 1/6/2022    Procedure: LARYNGECTOMY;  Surgeon: Jesse James MD;  [facility];  Service: ENT;  Laterality: N/A;    LARYNGOSCOPY N/A 8/4/2020    Procedure: Suspension microlaryngoscopy with biopsy, possible KTP laser treatment/excision;  Surgeon: Stew Noel MD;  Location: [facility] OR [address] [place] [address];  Service: ENT;  Laterality: N/A;  Microscope, telescopes, tower, microinstruments, KTP laser, rep conf# [id number] IC 7/28.    LARYNGOSCOPY N/A 3/16/2021    Procedure: Suspension microlaryngoscopy with excision of lesion, possible CO2 laser;  Surgeon: Stew Noel MD;  Location: [facility] OR [address] [place] [address];  Service: ENT;  Laterality: N/A;  Microscope, telescopes, tower,  microinstruments, CO2 laser, rep conf# [id number] IC 3/4.    LARYNGOSCOPY N/A 4/1/2021    Procedure: Suspension microlaryngoscopy with KTP laser excision of lesion;  Surgeon: Stew Noel MD;  Location: [facility] OR [address] [place] [address];  Service: ENT;  Laterality: N/A;  Microscope, telescopes, tower, microinstruments, 70 degree scope, vocal fold , KTP laser, rep conf# [id number] BC    LARYNGOSCOPY N/A 12/9/2021    Procedure: Suspension microlaryngoscopy with biopsy;  Surgeon: Stew Noel MD;  [facility];  Service: ENT;  Laterality: N/A;  Microscope, telescopes, tower, microinstruments    LARYNGOSCOPY N/A 1/6/2022    Procedure: LARYNGOSCOPY;  Surgeon: Jesse James MD;  Location: [facility] OR [address] [place] [address];  Service: ENT;  Laterality: N/A;    MICROLARYNGOSCOPY N/A 3/17/2020    Procedure: MICROLARYNGOSCOPY;  Surgeon: Jung Xiao MD;  Location: [facility];  Service: ENT;  Laterality: N/A;  Laser Microlaryngoscopy  NEED TO SCHEDULE LASER from [place] [id number]    REIMPLANTATION OF PARATHYROID TISSUE N/A 1/6/2022    Procedure: REIMPLANTATION, PARATHYROID TISSUE;  Surgeon: Jesse James MD;  Location: [facility] OR [address] [place] [address];  Service: ENT;  Laterality: N/A;    THYROIDECTOMY  1/6/2022    Procedure: THYROIDECTOMY;  Surgeon: Jesse James MD;  [facility];  Service: ENT;;    TRACHEOSTOMY N/A 12/27/2021    Procedure: CREATION, TRACHEOSTOMY;  Surgeon: Flex Espinosa MD;  [facility];  Service: ENT;  Laterality: N/A;       TREATMENT HISTORY:  Hx of radiation tx, now salvage laryngectomy.      INTERVAL HISTORY:  His salvage TL was 1/6/22.      INTERVENTION:  Stoma: Stoma is patent with crusting, which was cleaned for patient. He has presence of stitches and staples which will remain for 1 additional week. Minimal mucus was suctioned. He is dry.     Pulmonary rehab:  Wearing Larytube with HME    AL training:  Electrolarynx pitch was adjusted to a masculine, lower  "pitch. He was trained in oral placement of electrolarynx. Model and max cue given to oral placement, rate of articulation and over articulation He was then able to demonstrate with 75% intelligibility.     Swallowing:  Currently NPO    Supplies: Prescription has been sent to [facility]. He will be needing HMEs.     FABIOLA management:  Encouraged remaining upright for at least 2 hours following tube feed, consume smaller feed prior to bed with "snacks" through the day to allow for appropriate caloric intake.       At the end of the session, Cyrus Batres Jr. was wearing  1.  Larytube and HMEs      IMPRESSIONS:  1.  Mr Batres is s/p 1/6/22 salvage laryngectomy  2.  History of  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       Past Surgical History:   Procedure Laterality Date    DIRECT LARYNGOBRONCHOSCOPY N/A 12/27/2021    Procedure: LARYNGOSCOPY, DIRECT, WITH BRONCHOSCOPY;  Surgeon: Flex Espinosa MD;  Location: [facility] OR [address] [place] [address];  Service: ENT;  Laterality: N/A;    DISSECTION OF NECK Bilateral 1/6/2022    Procedure: DISSECTION, NECK;  Surgeon: Jesse James MD;  [facility];  Service: ENT;  Laterality: Bilateral;    FLAP PROCEDURE Right 1/6/2022    Procedure: CREATION, FREE FLAP;  Surgeon: Alise Hart MD;  [facility];  Service: ENT;  Laterality: Right;  Ischemic start 1351  Ischemic stop 1502    LARYNGECTOMY N/A 1/6/2022    Procedure: LARYNGECTOMY;  Surgeon: Jesse James MD;  [facility];  Service: ENT;  Laterality: N/A;    LARYNGOSCOPY N/A 8/4/2020    Procedure: Suspension microlaryngoscopy with biopsy, possible KTP laser treatment/excision;  Surgeon: Stew Noel MD;  [facility];  Service: ENT;  Laterality: N/A;  Microscope, telescopes, tower, microinstruments, KTP laser, " rep conf# [id number] IC 7/28.    LARYNGOSCOPY N/A 3/16/2021    Procedure: Suspension microlaryngoscopy with excision of lesion, possible CO2 laser;  Surgeon: Stew Noel MD;  [facility];  Service: ENT;  Laterality: N/A;  Microscope, telescopes, tower, microinstruments, CO2 laser, rep conf# [id number] IC 3/4.    LARYNGOSCOPY N/A 4/1/2021    Procedure: Suspension microlaryngoscopy with KTP laser excision of lesion;  Surgeon: Stew Noel MD;  [facility];  Service: ENT;  Laterality: N/A;  Microscope, telescopes, tower, microinstruments, 70 degree scope, vocal fold , KTP laser, rep conf# [id number] BC    LARYNGOSCOPY N/A 12/9/2021    Procedure: Suspension microlaryngoscopy with biopsy;  Surgeon: Stew Noel MD;  [facility];  Service: ENT;  Laterality: N/A;  Microscope, telescopes, tower, microinstruments    LARYNGOSCOPY N/A 1/6/2022    Procedure: LARYNGOSCOPY;  Surgeon: Jesse James MD;  [facility];  Service: ENT;  Laterality: N/A;    MICROLARYNGOSCOPY N/A 3/17/2020    Procedure: MICROLARYNGOSCOPY;  Surgeon: Jung Xiao MD;  Location: [facility] OR;  Service: ENT;  Laterality: N/A;  Laser Microlaryngoscopy  NEED TO SCHEDULE LASER from [facility] [id number]    REIMPLANTATION OF PARATHYROID TISSUE N/A 1/6/2022    Procedure: REIMPLANTATION, PARATHYROID TISSUE;  Surgeon: Jesse James MD;  [facility]: [facility];  Service: ENT;  Laterality: N/A;    THYROIDECTOMY  1/6/2022    Procedure: THYROIDECTOMY;  Surgeon: Jesse James MD;  [facility];  Service: ENT;;    TRACHEOSTOMY N/A 12/27/2021    Procedure: CREATION, TRACHEOSTOMY;  Surgeon: Flex Espinosa MD;  [facility]: [facility];  Service: ENT;  Laterality: N/A;         RECOMMENDATIONS/PLAN OF CARE:    1.  Continued use of electrolarynx with oral placement for all oral communication.  2.  Twice daily cleaning of stoma.  3.  Continue use of  HMEs and LaryTube 24/7 daily.  4.  Continued ST for 12  weeks with a home program.  5.  Follow up with  or Fariha Muñoz NP as directed.    Long-term goals:  Cyrus Batres   will   1.  Be independent in use of electrolarynx for all oral communication.  2.  Be independent in care of stoma.      Short-term objectives:  Cyrus Ryanphil Herbert will  1.  Continued use of electrolarynx for all oral communication.  2.  Twice daily cleaning of stoma.  3.  Continue use of HMEs and LaryTube 24/7 daily.  4.  Continued ST for 12 weeks with a home program.   A.  AL training  5. F/u in 1 week

## 2022-01-27 NOTE — PROGRESS NOTES
"Oncology Nutrition Assessment for Medical Nutrition Therapy  Initial Visit    Cyrus Batres Jr.   1950    Referring Provider: Fariha Muñoz NP      Reason for Visit: nutrition counseling and education    PMHx:   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       Nutrition Assessment    This is a 71 y.o.male with a medical diagnosis of laryngeal cancer s/p IMRT 10/2020. He developed recurrence for which he underwent transoral laryngeal surgery. He then underwent laryngoscopy with biopsy which revealed SCC. He then presented to the ED due to hemoptysis 2/2 tumor bleeding. Taken for trach and PEG 12/27. Then underwent bilateral neck dissection, total laryngectomy, total thyroidectomy, and free flap 1/6. He was discharged on TF of Glucerna 1.5 bolus 5 cartons/day (provides 1778 kcal/day and 98g protein/day). He is flushing the tube with two 60mL syringes after each feeding. He reports he hasn't been checking his BG lately.    Weight: 61.2 kg (134 lb 14.7 oz)  Height: 5' 6" (1.676 m)  BMI: 21.6    Usual BW: 145lb  Weight Change: 10lb loss over 1-2 months; maintained over the past week    Allergies: Pollen extracts and Lovastatin    Current Medications:  Current Outpatient Medications:     apixaban (ELIQUIS) 5 mg Tab, Take 1 tablet (5 mg total) by mouth 2 (two) times daily., Disp: 180 tablet, Rfl: 2    aspirin (ECOTRIN) 81 MG EC tablet, Take 1 tablet by mouth Daily., Disp: , Rfl:     blood sugar diagnostic (BLOOD GLUCOSE TEST) Strp, 1 strip by Misc.(Non-Drug; Combo Route) route 2 (two) times daily before meals., Disp: 200 strip, Rfl: 2    calcitrioL (ROCALTROL) 1 mcg/mL solution, Take 0.5 mLs (0.5 mcg total) by mouth once daily., Disp: 15 mL, Rfl: 0    calcium carbonate 500 mg/5 mL (1,250 mg/5 mL), Take 10 mLs (1,000 mg total) by G Tube route " "3 (three) times daily. Take 1000 mg (10 mLs) of calcium 3 times daily for 1 week, then 1000 mg (10 mLs) twice daily x 1 week, 10 mLs once daily x 1 week, then stop., Disp: 473 mL, Rfl: 0    ciclopirox (LOPROX) 0.77 % Crea, Apply topically 2 (two) times daily., Disp: 1 Tube, Rfl: 2    diltiaZEM (CARDIZEM) 30 MG tablet, take 1 tablet (30 mg total) by Per G Tube route every 6 (six) hours., Disp: 120 tablet, Rfl: 2    docusate (COLACE) 50 mg/5 mL liquid, take 10 mLs (100 mg total) by Per G Tube route 2 (two) times daily., Disp: 473 mL, Rfl: 1    ergocalciferol (ERGOCALCIFEROL) 50,000 unit Cap, TAKE 1 CAPSULE TWICE WEEKLY (Patient taking differently: Take 50,000 Units by mouth every 7 days. MONDAYS & THURSDAYS), Disp: 24 capsule, Rfl: 3    famotidine (PEPCID) 20 MG tablet, take 1 tablet (20 mg total) by Per G Tube route 2 (two) times daily., Disp: 60 tablet, Rfl: 11    levothyroxine (SYNTHROID) 112 MCG tablet, take 1 tablet (112 mcg total) by Per G Tube route before breakfast. Take 4 hours before first dose of calcium and 3 hours before tube feed, Disp: 30 tablet, Rfl: 11    losartan (COZAAR) 100 MG tablet, TAKE 1 TABLET BY MOUTH EVERY DAY (Patient taking differently: Take 100 mg by mouth every evening.), Disp: 90 tablet, Rfl: 3    oxyCODONE (ROXICODONE) 5 mg/5 mL Soln, take 5 mLs (5 mg total) by Per G Tube route every 6 (six) hours as needed (pain)., Disp: 473 mL, Rfl: 0    pen needle, diabetic (BD ULTRA-FINE YULISSA PEN NEEDLE) 32 gauge x 5/32" Ndle, Use once weekly., Disp: 30 each, Rfl: 1    polyethylene glycol (GLYCOLAX) 17 gram/dose powder, Dissolve 1 capful (17 grams) in liquid and give by Per G Tube route once daily., Disp: 510 g, Rfl: 0    sennosides 8.8 mg/5 ml (SENOKOT) 8.8 mg/5 mL syrup, 5 mLs by Per G Tube route daily as needed (constipation)., Disp: 150 mL, Rfl: 0  No current facility-administered medications for this visit.    Facility-Administered Medications Ordered in Other Visits:     lactated " ringers infusion, , Intravenous, Continuous, Torsten Hilton MD    Food/medication interactions noted: none    Vitamins/Supplements: none reported    Labs: Reviewed    Nutrition Diagnosis    Problem: altered GI function  Etiology (related to): dysphagia  Signs/Symptoms (as evidenced by): requiring tube feeding for nutrition support    Nutrition Intervention    Nutrition Prescription   1836 kcals (30kcal/kg)  73-86g protein (1.2-1.4g/kg)   1836mL fluid (30mL/kg)    Recommendations:  Continue current TF of Glucerna 1.5 bolus 5 cartons/day  Continue to flush with 120mL water after each feeding  Plans for esophagram next week - will likely advance to [place] diet per NP    Nutrition Monitoring and Evaluation    Monitor: energy intake, rate/formulation of tube feeding and weight status    Goals: weight maintenance    Follow up: in 2-3 weeks    Communication to referring provider/care team: note available in chart; discussed with NP    Counseling time: 20 minutes    Diana Huffman MS, RD, LDN  [phone]

## 2022-01-28 ENCOUNTER — CLINICAL SUPPORT (OUTPATIENT)
Dept: HEMATOLOGY/ONCOLOGY | Facility: CLINIC | Age: 72
End: 2022-01-28
Payer: MEDICARE

## 2022-01-28 ENCOUNTER — LAB VISIT (OUTPATIENT)
Dept: LAB | Facility: HOSPITAL | Age: 72
End: 2022-01-28
Payer: MEDICARE

## 2022-01-28 ENCOUNTER — OFFICE VISIT (OUTPATIENT)
Dept: OTOLARYNGOLOGY | Facility: CLINIC | Age: 72
End: 2022-01-28
Payer: MEDICARE

## 2022-01-28 ENCOUNTER — CLINICAL SUPPORT (OUTPATIENT)
Dept: SPEECH THERAPY | Facility: HOSPITAL | Age: 72
End: 2022-01-28
Payer: MEDICARE

## 2022-01-28 VITALS
SYSTOLIC BLOOD PRESSURE: 102 MMHG | DIASTOLIC BLOOD PRESSURE: 64 MMHG | BODY MASS INDEX: 21.78 KG/M2 | TEMPERATURE: 97 F | HEART RATE: 79 BPM | WEIGHT: 134.94 LBS

## 2022-01-28 DIAGNOSIS — C32.9 LARYNX NEOPLASM MALIGNANT: Primary | ICD-10-CM

## 2022-01-28 DIAGNOSIS — Z90.02 S/P LARYNGECTOMY: Primary | ICD-10-CM

## 2022-01-28 DIAGNOSIS — Z90.02 S/P LARYNGECTOMY: ICD-10-CM

## 2022-01-28 DIAGNOSIS — Z71.3 NUTRITIONAL COUNSELING: ICD-10-CM

## 2022-01-28 DIAGNOSIS — R13.14 DYSPHAGIA, PHARYNGOESOPHAGEAL: ICD-10-CM

## 2022-01-28 DIAGNOSIS — R49.1 APHONIA: Primary | ICD-10-CM

## 2022-01-28 LAB
CA-I BLDV-SCNC: 1.23 MMOL/L (ref 1.06–1.42)
CALCIUM SERPL-MCNC: 10 MG/DL (ref 8.7–10.5)
PTH-INTACT SERPL-MCNC: 16.7 PG/ML (ref 9–77)

## 2022-01-28 PROCEDURE — 92507 TX SP LANG VOICE COMM INDIV: CPT | Mod: GN,HCNC

## 2022-01-28 PROCEDURE — 1159F MED LIST DOCD IN RCRD: CPT | Mod: HCNC,CPTII,S$GLB, | Performed by: NURSE PRACTITIONER

## 2022-01-28 PROCEDURE — 1101F PT FALLS ASSESS-DOCD LE1/YR: CPT | Mod: HCNC,CPTII,S$GLB, | Performed by: NURSE PRACTITIONER

## 2022-01-28 PROCEDURE — 3074F SYST BP LT 130 MM HG: CPT | Mod: HCNC,CPTII,S$GLB, | Performed by: NURSE PRACTITIONER

## 2022-01-28 PROCEDURE — 3078F PR MOST RECENT DIASTOLIC BLOOD PRESSURE < 80 MM HG: ICD-10-PCS | Mod: HCNC,CPTII,S$GLB, | Performed by: NURSE PRACTITIONER

## 2022-01-28 PROCEDURE — 3044F HG A1C LEVEL LT 7.0%: CPT | Mod: HCNC,CPTII,S$GLB, | Performed by: NURSE PRACTITIONER

## 2022-01-28 PROCEDURE — 99999 PR PBB SHADOW E&M-EST. PATIENT-LVL I: CPT | Mod: PBBFAC,HCNC,, | Performed by: DIETITIAN, REGISTERED

## 2022-01-28 PROCEDURE — 1126F PR PAIN SEVERITY QUANTIFIED, NO PAIN PRESENT: ICD-10-PCS | Mod: HCNC,CPTII,S$GLB, | Performed by: NURSE PRACTITIONER

## 2022-01-28 PROCEDURE — 3008F PR BODY MASS INDEX (BMI) DOCUMENTED: ICD-10-PCS | Mod: HCNC,CPTII,S$GLB, | Performed by: NURSE PRACTITIONER

## 2022-01-28 PROCEDURE — 82310 ASSAY OF CALCIUM: CPT | Mod: HCNC | Performed by: NURSE PRACTITIONER

## 2022-01-28 PROCEDURE — 99024 POSTOP FOLLOW-UP VISIT: CPT | Mod: HCNC,S$GLB,, | Performed by: NURSE PRACTITIONER

## 2022-01-28 PROCEDURE — 3008F BODY MASS INDEX DOCD: CPT | Mod: HCNC,CPTII,S$GLB, | Performed by: NURSE PRACTITIONER

## 2022-01-28 PROCEDURE — 3288F PR FALLS RISK ASSESSMENT DOCUMENTED: ICD-10-PCS | Mod: HCNC,CPTII,S$GLB, | Performed by: NURSE PRACTITIONER

## 2022-01-28 PROCEDURE — 99999 PR PBB SHADOW E&M-EST. PATIENT-LVL III: CPT | Mod: PBBFAC,HCNC,, | Performed by: NURSE PRACTITIONER

## 2022-01-28 PROCEDURE — 36415 COLL VENOUS BLD VENIPUNCTURE: CPT | Mod: HCNC | Performed by: NURSE PRACTITIONER

## 2022-01-28 PROCEDURE — 3078F DIAST BP <80 MM HG: CPT | Mod: HCNC,CPTII,S$GLB, | Performed by: NURSE PRACTITIONER

## 2022-01-28 PROCEDURE — 99024 PR POST-OP FOLLOW-UP VISIT: ICD-10-PCS | Mod: HCNC,S$GLB,, | Performed by: NURSE PRACTITIONER

## 2022-01-28 PROCEDURE — 97802 PR MED NUTR THER, 1ST, INDIV, EA 15 MIN: ICD-10-PCS | Mod: HCNC,S$GLB,, | Performed by: DIETITIAN, REGISTERED

## 2022-01-28 PROCEDURE — 82330 ASSAY OF CALCIUM: CPT | Mod: HCNC | Performed by: NURSE PRACTITIONER

## 2022-01-28 PROCEDURE — 99999 PR PBB SHADOW E&M-EST. PATIENT-LVL I: ICD-10-PCS | Mod: PBBFAC,HCNC,, | Performed by: DIETITIAN, REGISTERED

## 2022-01-28 PROCEDURE — 83970 ASSAY OF PARATHORMONE: CPT | Mod: HCNC | Performed by: NURSE PRACTITIONER

## 2022-01-28 PROCEDURE — 3044F PR MOST RECENT HEMOGLOBIN A1C LEVEL <7.0%: ICD-10-PCS | Mod: HCNC,CPTII,S$GLB, | Performed by: NURSE PRACTITIONER

## 2022-01-28 PROCEDURE — 1101F PR PT FALLS ASSESS DOC 0-1 FALLS W/OUT INJ PAST YR: ICD-10-PCS | Mod: HCNC,CPTII,S$GLB, | Performed by: NURSE PRACTITIONER

## 2022-01-28 PROCEDURE — 1159F PR MEDICATION LIST DOCUMENTED IN MEDICAL RECORD: ICD-10-PCS | Mod: HCNC,CPTII,S$GLB, | Performed by: NURSE PRACTITIONER

## 2022-01-28 PROCEDURE — 97802 MEDICAL NUTRITION INDIV IN: CPT | Mod: HCNC,S$GLB,, | Performed by: DIETITIAN, REGISTERED

## 2022-01-28 PROCEDURE — 3074F PR MOST RECENT SYSTOLIC BLOOD PRESSURE < 130 MM HG: ICD-10-PCS | Mod: HCNC,CPTII,S$GLB, | Performed by: NURSE PRACTITIONER

## 2022-01-28 PROCEDURE — 99999 PR PBB SHADOW E&M-EST. PATIENT-LVL III: ICD-10-PCS | Mod: PBBFAC,HCNC,, | Performed by: NURSE PRACTITIONER

## 2022-01-28 PROCEDURE — 3288F FALL RISK ASSESSMENT DOCD: CPT | Mod: HCNC,CPTII,S$GLB, | Performed by: NURSE PRACTITIONER

## 2022-01-28 PROCEDURE — 1126F AMNT PAIN NOTED NONE PRSNT: CPT | Mod: HCNC,CPTII,S$GLB, | Performed by: NURSE PRACTITIONER

## 2022-01-28 NOTE — ASSESSMENT & PLAN NOTE
Healing well. Staples removed. Esophagram planned for next week. Questions answered. RTC in 2-3 weeks, sooner if needed.

## 2022-01-28 NOTE — PROGRESS NOTES
"DIAGNOSIS:  Alaryngeal voice (R49.0), s/p laryngectomy (Z90.02), History laryngeal cancer (Z85.21), History head and neck radiation (Z92.3) and History chemotherapy (Z92.21)  REFERRING DOCTOR:  Jesse James M.D., Head and Neck Surgical Oncologist     REASON FOR VISIT:   1/28/22: Nicolás Batres returns today reporting blood in stoma, filling larytube at times. This occurred on 1 day, he was concerned he became too dry following taking Benadryl. He continues to be NPO. He has been practicing with electrolarynx, wife states she understands "maybe half of what he says." They are both somewhat frustrated with communication with EL. He feels that neck is tight, thinks it is related to staples. He reports continued weight loss or at least no weight gain. He is hungry, despite feeding tube use of 5 cans per day. He is meeting with dietician today.     1/20/22: Cyrus Batres Jr. returned to clinic today for electrolarynx train, obtaining supplies, stoma care.  He was un/accompanied by his wife. He states he has gained a few lbs since hospital discharge. He is currently NPO, relying on feeding tube for nutrition. He describes gurgling sound after tube feeds, can taste the formula. He is using 4-5 cans a day. "Doesn't feel full or hungry." 1 carton at a time with water, upright endorses reflux even if he stays upright for an hour. "Food water and medicines go down initially and then come back up" kind of regurgitate into the tube and it won't clear. He and wife are managing stoma well, he is wearing Larytube and HMEs.     Oncology History   Larynx neoplasm malignant   8/4/2020 Initial Diagnosis    Larynx neoplasm malignant     8/6/2020 Tumor Conference    His case was discussed at the Multidisciplinary Head and Neck Team Planning Meeting.    Representatives from Medical Oncology, Radiation Oncology, Head and Neck Surgical Oncology, Psychosocial Oncology, and Speech and Language Pathology discussed the case with the " following recommendations:    1) definitive radiation              8/6/2020 Cancer Staged    Staging form: Larynx - Glottis, AJCC 8th Edition  - Clinical stage from 8/6/2020: Stage II (cT2, cN0, cM0)     8/6/2020 Cancer Staged    Cancer Staging  Larynx neoplasm malignant  Staging form: Larynx - Glottis, AJCC 8th Edition  - Clinical stage from 8/6/2020: Stage II (cT2, cN0, cM0) - Signed by Fariha Muñoz NP on 8/6/2020 12/16/2021 Tumor Conference    His case was discussed at the Multidisciplinary Head and Neck Team Planning Meeting.    Representatives from Medical Oncology, Radiation Oncology, Head and Neck Surgical Oncology, Psychosocial Oncology, and Speech and Language Pathology discussed the case with the following recommendations:    1) TL  2) oncology referral  3) psychology referral            12/27/2021 Surgery    1. DL And tracheostomy  2. PEG     1/6/2022 Surgery    1. Diagnostic direct laryngoscopy  2. Bilateral modified neck dissection of levels 2 through 4  3. Total laryngectomy  4. Total thyroidectomy with bilateral paratracheal/upper mediastinal neck dissection  5. Autotransplantation of left inferior parathyroid into left sternocleidomastoid muscle   6. Left anterolateral thigh free flap for closure of total laryngectomy neck skin defect  7. Advancement flap for closure of left thigh donor site advanced area was 25 x 10 cm     1/20/2022 Cancer Staged    Staging form: Larynx - Glottis, AJCC 8th Edition  - Pathologic stage from 1/20/2022: Stage LOU (pT4a, pN0, cM0)          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       MEDICAL/SURGICAL HISTORY         Past Surgical History:   Procedure Laterality Date    DIRECT LARYNGOBRONCHOSCOPY N/A 12/27/2021     Procedure: LARYNGOSCOPY, DIRECT, WITH BRONCHOSCOPY;  Surgeon: Flex MARTINEZ  MD Jesús;  Location: [facility] OR [facility] FLR;  Service: ENT;  Laterality: N/A;    DISSECTION OF NECK Bilateral 1/6/2022     Procedure: DISSECTION, NECK;  Surgeon: Jesse James MD;  Location: [facility] OR [facility] FLR;  Service: ENT;  Laterality: Bilateral;    FLAP PROCEDURE Right 1/6/2022     Procedure: CREATION, FREE FLAP;  Surgeon: Alise Hart MD;  Location: [facility] OR [facility] FLR;  Service: ENT;  Laterality: Right;  Ischemic start 1351  Ischemic stop 1502    LARYNGECTOMY N/A 1/6/2022     Procedure: LARYNGECTOMY;  Surgeon: Jesse James MD;  Location: [facility] OR [facility] FLR;  Service: ENT;  Laterality: N/A;    LARYNGOSCOPY N/A 8/4/2020     Procedure: Suspension microlaryngoscopy with biopsy, possible KTP laser treatment/excision;  Surgeon: Stew Noel MD;  [facility];  Service: ENT;  Laterality: N/A;  Microscope, telescopes, tower, microinstruments, KTP laser, rep conf# [id number] IC 7/28.    LARYNGOSCOPY N/A 3/16/2021     Procedure: Suspension microlaryngoscopy with excision of lesion, possible CO2 laser;  Surgeon: Stew Noel MD;  [facility];  Service: ENT;  Laterality: N/A;  Microscope, telescopes, tower, microinstruments, CO2 laser, rep conf# [id number] IC 3/4.    LARYNGOSCOPY N/A 4/1/2021     Procedure: Suspension microlaryngoscopy with KTP laser excision of lesion;  Surgeon: Stew Noel MD;  [facility];  Service: ENT;  Laterality: N/A;  Microscope, telescopes, tower, microinstruments, 70 degree scope, vocal fold , KTP laser, rep conf# [id number] BC    LARYNGOSCOPY N/A 12/9/2021     Procedure: Suspension microlaryngoscopy with biopsy;  Surgeon: Stew Noel MD;  [facility];  Service: ENT;  Laterality: N/A;  Microscope, telescopes, tower, microinstruments    LARYNGOSCOPY N/A 1/6/2022     Procedure: LARYNGOSCOPY;  Surgeon: Jesse James MD;  Location: [facility] OR [address] [place] [address];  Service: ENT;  Laterality: N/A;     MICROLARYNGOSCOPY N/A 3/17/2020     Procedure: MICROLARYNGOSCOPY;  Surgeon: Jung Xiao MD;  Location: [facility] OR;  Service: ENT;  Laterality: N/A;  Laser Microlaryngoscopy  NEED TO SCHEDULE LASER from [employer] [id number]    REIMPLANTATION OF PARATHYROID TISSUE N/A 1/6/2022     Procedure: REIMPLANTATION, PARATHYROID TISSUE;  Surgeon: Jesse James MD;  Location: [facility] 2ND FLR;  Service: ENT;  Laterality: N/A;    THYROIDECTOMY   1/6/2022     Procedure: THYROIDECTOMY;  Surgeon: Jesse James MD;  Location: [facility] OR 2ND FLR;  Service: ENT;;    TRACHEOSTOMY N/A 12/27/2021     Procedure: CREATION, TRACHEOSTOMY;  Surgeon: Flex Espinosa MD;  Location: [facility] OR 2ND FLR;  Service: ENT;  Laterality: N/A;         TREATMENT HISTORY:  Hx of radiation tx, 1/6/22 salvage laryngectomy.        INTERVAL HISTORY:  His salvage TL was 1/6/22.       INTERVENTION:  Stoma: Stoma is patent with dried blood crusting from 3 to 9 o'clock, which was removed and cleaned for patient. Stitches and staples were removed by Fariha Muñoz NP during this visit. Minimal mucus was suctioned. He is dry, given recs for using saline. Larytube is small inside stoma, size 9, it is sliding in and out of stoma and could be contributing to bleeding and tissue irritation as well as coughing. He was sized to a 12 LaryTube.      Pulmonary rehab:  Wearing Larytube with HME, now using water soluble lubricant for tube insertion     AL training:  Electrolarynx pitch was again slightly adjusted. He was trained in oral placement of electrolarynx and at cheek as he is unable to generate voice with neck placement. Model and max cue given to oral placement, rate of articulation and over articulation He was then able to demonstrate with 80% intelligibility.      Swallowing:  Currently NPO     Supplies: Prescription will be sent to [facility] for HMEs, LaryTube and Trutone EL. He would like day and night time HMEs.      FABIOLA management:  Encouraged  "remaining upright for at least 2 hours following tube feed, consume smaller feed prior to bed with "snacks" through the day to allow for appropriate caloric intake.         At the end of the session, Cyrus Batres Jr. was wearing  1.  Larytube and HMEs        IMPRESSIONS:  1.  Mr Batres is s/p 1/6/22 salvage laryngectomy  2.  History of  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          Past Surgical History:   Procedure Laterality Date    DIRECT LARYNGOBRONCHOSCOPY N/A 12/27/2021    Procedure: LARYNGOSCOPY, DIRECT, WITH BRONCHOSCOPY;  Surgeon: Flex Espinosa MD;  Location: [facility] OR [facility] FLR;  Service: ENT;  Laterality: N/A;    DISSECTION OF NECK Bilateral 1/6/2022    Procedure: DISSECTION, NECK;  Surgeon: Jesse James MD;  [facility];  Service: ENT;  Laterality: Bilateral;    FLAP PROCEDURE Right 1/6/2022    Procedure: CREATION, FREE FLAP;  Surgeon: Alise Hart MD;  Location: [facility] OR [facility] FLR;  Service: ENT;  Laterality: Right;  Ischemic start 1351  Ischemic stop 1502    LARYNGECTOMY N/A 1/6/2022    Procedure: LARYNGECTOMY;  Surgeon: Jesse James MD;  [facility];  Service: ENT;  Laterality: N/A;    LARYNGOSCOPY N/A 8/4/2020    Procedure: Suspension microlaryngoscopy with biopsy, possible KTP laser treatment/excision;  Surgeon: Stew Noel MD;  [facility];  Service: ENT;  Laterality: N/A;  Microscope, telescopes, tower, microinstruments, KTP laser, rep conf# [id number] IC 7/28.    LARYNGOSCOPY N/A 3/16/2021    Procedure: Suspension microlaryngoscopy with excision of lesion, possible CO2 laser;  Surgeon: Stew Noel MD;  [facility];  Service: ENT;  Laterality: N/A;  Microscope, telescopes, tower, microinstruments, CO2 laser, rep conf# [id number] IC 3/4.    " LARYNGOSCOPY N/A 4/1/2021    Procedure: Suspension microlaryngoscopy with KTP laser excision of lesion;  Surgeon: Stew Noel MD;  [facility];  Service: ENT;  Laterality: N/A;  Microscope, telescopes, tower, microinstruments, 70 degree scope, vocal fold , KTP laser, rep conf# [id number] BC    LARYNGOSCOPY N/A 12/9/2021    Procedure: Suspension microlaryngoscopy with biopsy;  Surgeon: Stew Noel MD;  [facility];  Service: ENT;  Laterality: N/A;  Microscope, telescopes, tower, microinstruments    LARYNGOSCOPY N/A 1/6/2022    Procedure: LARYNGOSCOPY;  Surgeon: Jesse James MD;  [facility];  Service: ENT;  Laterality: N/A;    MICROLARYNGOSCOPY N/A 3/17/2020    Procedure: MICROLARYNGOSCOPY;  Surgeon: Jung Xiao MD;  Location: [facility];  Service: ENT;  Laterality: N/A;  Laser Microlaryngoscopy  NEED TO SCHEDULE LASER from [employer] [id number]    REIMPLANTATION OF PARATHYROID TISSUE N/A 1/6/2022    Procedure: REIMPLANTATION, PARATHYROID TISSUE;  Surgeon: Jesse Jamse MD;  Location: [facility] OR [address] [place] [address];  Service: ENT;  Laterality: N/A;    THYROIDECTOMY  1/6/2022    Procedure: THYROIDECTOMY;  Surgeon: Jesse James MD;  Location: [facility] OR [address] [place] [address];  Service: ENT;;    TRACHEOSTOMY N/A 12/27/2021    Procedure: CREATION, TRACHEOSTOMY;  Surgeon: Flex Espinosa MD;  Location: [facility] OR [address] [place] [address];  Service: ENT;  Laterality: N/A;                RECOMMENDATIONS/PLAN OF CARE:    1.  Continued use of electrolarynx with oral placement for all oral communication.  2.  Daily cleaning of stoma and Larytube, at least twice daily - or more, depending on mucus production.  3.  Continue use of HMEs and LaryTube 24/7 daily. Begin use of saline mist in stoma several times per day.  4.  Continued ST for EL training and stoma care.  5.  Follow up with  or Fariha Muñoz NP as directed.     Long-term goals:  Cyrus Batres Jr.  will   1.  Be  independent in use of electrolarynx for all oral communication.  2.  Be independent in care of stoma.        Short-term objectives:  Cyrus Batres Jr. will  1.  Continued use of electrolarynx for all oral communication.  2.  Twice daily cleaning of stoma.  3.  Continue use of HMEs and LaryTube 24/7 daily.  4.  Continued ST for 12 weeks with a home program.              A.  AL training  5. F/u in 2-3 weeks

## 2022-01-28 NOTE — PROGRESS NOTES
Chief Complaint   Patient presents with    Follow-up     Oncology History   Larynx neoplasm malignant   8/4/2020 Initial Diagnosis    Larynx neoplasm malignant     8/6/2020 Tumor Conference    His case was discussed at the Multidisciplinary Head and Neck Team Planning Meeting.    Representatives from Medical Oncology, Radiation Oncology, Head and Neck Surgical Oncology, Psychosocial Oncology, and Speech and Language Pathology discussed the case with the following recommendations:    1) definitive radiation              8/6/2020 Cancer Staged    Staging form: Larynx - Glottis, AJCC 8th Edition  - Clinical stage from 8/6/2020: Stage II (cT2, cN0, cM0)     8/6/2020 Cancer Staged    Cancer Staging  Larynx neoplasm malignant  Staging form: Larynx - Glottis, AJCC 8th Edition  - Clinical stage from 8/6/2020: Stage II (cT2, cN0, cM0) - Signed by Fariha Muñoz NP on 8/6/2020 12/16/2021 Tumor Conference    His case was discussed at the Multidisciplinary Head and Neck Team Planning Meeting.    Representatives from Medical Oncology, Radiation Oncology, Head and Neck Surgical Oncology, Psychosocial Oncology, and Speech and Language Pathology discussed the case with the following recommendations:    1) TL  2) oncology referral  3) psychology referral            12/27/2021 Surgery    1. DL And tracheostomy  2. PEG     1/6/2022 Surgery    1. Diagnostic direct laryngoscopy  2. Bilateral modified neck dissection of levels 2 through 4  3. Total laryngectomy  4. Total thyroidectomy with bilateral paratracheal/upper mediastinal neck dissection  5. Autotransplantation of left inferior parathyroid into left sternocleidomastoid muscle   6. Left anterolateral thigh free flap for closure of total laryngectomy neck skin defect  7. Advancement flap for closure of left thigh donor site advanced area was 25 x 10 cm     1/20/2022 Cancer Staged    Staging form: Larynx - Glottis, AJCC 8th Edition  - Pathologic stage from 1/20/2022:  Stage LOU (pT4a, pN0, cM0)           HPI   71 y.o. male  returns for a post op visit. He has been doing well since leaving the hospital. He has minimal pain. He has no fever. He is tolerating tube feeds. He had one episode of coughing up some blood from his stoma, but has not had any bleeding since. No complaints.    Review of Systems   Constitutional: Negative for fatigue and unexpected weight change.   HENT: Per HPI.  Eyes: Negative for visual disturbance.   Respiratory: Negative for shortness of breath, hemoptysis   Cardiovascular: Negative for chest pain and palpitations.   Musculoskeletal: Negative for decreased ROM, back pain.   Skin: Negative for rash, sunburn, itching.   Neurological: Negative for dizziness and seizures.   Hematological: Negative for adenopathy. Does not bruise/bleed easily.   Endocrine: Negative for rapid weight loss/weight gain, heat/cold intolerance.     Past Medical History   Patient Active Problem List   Diagnosis    Melanocytic nevus    Body mass index (BMI) of 29.0-29.9 in adult    Benign hypertension    Overweight    Paroxysmal atrial fibrillation    Type 2 diabetes mellitus with diabetic arthropathy, with long-term current use of insulin    Fatty liver disease, nonalcoholic    False positive serological test for hepatitis C    Hyperlipidemia    Type 2 diabetes mellitus with hyperglycemia, without long-term current use of insulin    Gastroesophageal reflux disease without esophagitis    Type 2 diabetes mellitus with hyperglycemia    Vitamin B12 deficiency    Hyperuricemia    Hypovitaminosis D    Proteinuria    On enteral nutrition    Larynx neoplasm malignant    Dehydration    NSVT (nonsustained ventricular tachycardia)    Adverse effect of adrenal cortical steroids, sequela    S/P laryngectomy    Hypocalcemia           Past Surgical History   Past Surgical History:   Procedure Laterality Date    DIRECT LARYNGOBRONCHOSCOPY N/A 12/27/2021    Procedure:  LARYNGOSCOPY, DIRECT, WITH BRONCHOSCOPY;  Surgeon: Flex Espinosa MD;  Location: [facility] OR 2ND FLR;  Service: ENT;  Laterality: N/A;    DISSECTION OF NECK Bilateral 1/6/2022    Procedure: DISSECTION, NECK;  Surgeon: Jesse James MD;  Location: [facility] OR 2ND FLR;  Service: ENT;  Laterality: Bilateral;    FLAP PROCEDURE Right 1/6/2022    Procedure: CREATION, FREE FLAP;  Surgeon: Alise Hart MD;  Location: [facility] OR 2ND FLR;  Service: ENT;  Laterality: Right;  Ischemic start 1351  Ischemic stop 1502    LARYNGECTOMY N/A 1/6/2022    Procedure: LARYNGECTOMY;  Surgeon: Jesse James MD;  Location: [facility] OR 2ND FLR;  Service: ENT;  Laterality: N/A;    LARYNGOSCOPY N/A 8/4/2020    Procedure: Suspension microlaryngoscopy with biopsy, possible KTP laser treatment/excision;  Surgeon: Stew Noel MD;  [facility];  Service: ENT;  Laterality: N/A;  Microscope, telescopes, tower, microinstruments, KTP laser, rep conf# [id number] IC 7/28.    LARYNGOSCOPY N/A 3/16/2021    Procedure: Suspension microlaryngoscopy with excision of lesion, possible CO2 laser;  Surgeon: Stew Noel MD;  [facility];  Service: ENT;  Laterality: N/A;  Microscope, telescopes, tower, microinstruments, CO2 laser, rep conf# [id number] IC 3/4.    LARYNGOSCOPY N/A 4/1/2021    Procedure: Suspension microlaryngoscopy with KTP laser excision of lesion;  Surgeon: Stew Noel MD;  Location: [facility] OR [facility] FLR;  Service: ENT;  Laterality: N/A;  Microscope, telescopes, tower, microinstruments, 70 degree scope, vocal fold , KTP laser, rep conf# [id number] BC    LARYNGOSCOPY N/A 12/9/2021    Procedure: Suspension microlaryngoscopy with biopsy;  Surgeon: Stew Noel MD;  Location: [facility] FLR;  Service: ENT;  Laterality: N/A;  Microscope, telescopes, tower, microinstruments    LARYNGOSCOPY N/A 1/6/2022    Procedure: LARYNGOSCOPY;  Surgeon: Jesse James MD;  Location: [facility] OR [address] [place] [address];   Service: ENT;  Laterality: N/A;    MICROLARYNGOSCOPY N/A 3/17/2020    Procedure: MICROLARYNGOSCOPY;  Surgeon: Jung Xiao MD;  Location: [facility] OR;  Service: ENT;  Laterality: N/A;  Laser Microlaryngoscopy  [facility] [id number]    REIMPLANTATION OF PARATHYROID TISSUE N/A 1/6/2022    Procedure: REIMPLANTATION, PARATHYROID TISSUE;  Surgeon: Jesse James MD;  Location: [facility] OR [facility] FLR;  Service: ENT;  Laterality: N/A;    THYROIDECTOMY  1/6/2022    Procedure: THYROIDECTOMY;  Surgeon: Jesse James MD;  Location: [facility] OR [facility] FLR;  Service: ENT;;    TRACHEOSTOMY N/A 12/27/2021    Procedure: CREATION, TRACHEOSTOMY;  Surgeon: Flex Espinosa MD;  Location: [facility] OR [facility] FLR;  Service: ENT;  Laterality: N/A;         Family History   Family History   Problem Relation Age of Onset    Abnormal EKG Mother     Diabetes Father     Heart disease Father     Hypertension Father            Social History   .  Social History     Socioeconomic History    Marital status:      Spouse name: Janel Batres    Number of children: 2   Occupational History    Occupation: [employer]     Employer: [employer]   Tobacco Use    Smoking status: Never Smoker    Smokeless tobacco: Never Used   Substance and Sexual Activity    Alcohol use: Not Currently     Comment: occasional    Drug use: No    Sexual activity: Yes     Partners: Female   Social History Narrative    2 children from his 1st wife         Allergies   Review of patient's allergies indicates:   Allergen Reactions    Pollen extracts     Lovastatin Rash     Not confirmed but pt skeptical           Physical Exam     Vitals:    01/28/22 1309   BP: 102/64   Pulse: 79   Temp: 96.8 °F (36 °C)         Body mass index is 21.78 kg/m².      General: AOx3, NAD   Respiratory:  Symmetric chest rise, normal effort  Oral Cavity:  Oral Tongue mobile, no lesions noted. Hard Palate WNL. No buccal or FOM lesions.  Oropharynx:  No  masses/lesions of the posterior pharyngeal wall. Tonsillar fossa without lesions. Soft palate without masses. Midline uvula.   Neck: Stoma widely patent. Skin paddle healthy with good color and turgor. Incision. CDI.  No redness, drainage, or fluid collection noted.  Edges well approximated. Staples removed.  Face: House Brackmann I bilaterally.   RLE: Incision CDI.  No redness, drainage, or fluid collection noted.  Edges well approximated. Staples removed.    Assessment/Plan  Problem List Items Addressed This Visit        ENT    S/P laryngectomy - Primary     Healing well. Staples removed. Esophagram planned for next week. Questions answered. RTC in 2-3 weeks, sooner if needed.         Relevant Orders    FL Esophagram Complete

## 2022-01-31 PROBLEM — R13.14 DYSPHAGIA, PHARYNGOESOPHAGEAL: Status: ACTIVE | Noted: 2022-01-31

## 2022-01-31 PROBLEM — R49.1 APHONIA: Status: ACTIVE | Noted: 2022-01-31

## 2022-02-01 ENCOUNTER — OFFICE VISIT (OUTPATIENT)
Dept: RADIATION ONCOLOGY | Facility: CLINIC | Age: 72
End: 2022-02-01
Payer: MEDICARE

## 2022-02-01 ENCOUNTER — OFFICE VISIT (OUTPATIENT)
Dept: HEMATOLOGY/ONCOLOGY | Facility: CLINIC | Age: 72
End: 2022-02-01
Payer: MEDICARE

## 2022-02-01 VITALS
RESPIRATION RATE: 16 BRPM | TEMPERATURE: 98 F | SYSTOLIC BLOOD PRESSURE: 118 MMHG | OXYGEN SATURATION: 100 % | WEIGHT: 134.13 LBS | DIASTOLIC BLOOD PRESSURE: 73 MMHG | HEART RATE: 84 BPM | BODY MASS INDEX: 21.64 KG/M2

## 2022-02-01 VITALS
TEMPERATURE: 97 F | WEIGHT: 134.06 LBS | HEART RATE: 84 BPM | HEIGHT: 66 IN | BODY MASS INDEX: 21.55 KG/M2 | RESPIRATION RATE: 19 BRPM | DIASTOLIC BLOOD PRESSURE: 64 MMHG | OXYGEN SATURATION: 99 % | SYSTOLIC BLOOD PRESSURE: 110 MMHG

## 2022-02-01 DIAGNOSIS — C32.0 MALIGNANT NEOPLASM OF TRUE VOCAL CORD: Primary | ICD-10-CM

## 2022-02-01 DIAGNOSIS — C32.9 LARYNX NEOPLASM MALIGNANT: Primary | ICD-10-CM

## 2022-02-01 PROCEDURE — 1159F PR MEDICATION LIST DOCUMENTED IN MEDICAL RECORD: ICD-10-PCS | Mod: S$GLB,,, | Performed by: RADIOLOGY

## 2022-02-01 PROCEDURE — 1101F PR PT FALLS ASSESS DOC 0-1 FALLS W/OUT INJ PAST YR: ICD-10-PCS | Mod: S$GLB,,, | Performed by: RADIOLOGY

## 2022-02-01 PROCEDURE — 1111F DSCHRG MED/CURRENT MED MERGE: CPT | Mod: S$GLB,,, | Performed by: RADIOLOGY

## 2022-02-01 PROCEDURE — 1111F PR DISCHARGE MEDS RECONCILED W/ CURRENT OUTPATIENT MED LIST: ICD-10-PCS | Mod: HCNC,CPTII,S$GLB, | Performed by: INTERNAL MEDICINE

## 2022-02-01 PROCEDURE — 1126F AMNT PAIN NOTED NONE PRSNT: CPT | Mod: S$GLB,,, | Performed by: RADIOLOGY

## 2022-02-01 PROCEDURE — 3078F PR MOST RECENT DIASTOLIC BLOOD PRESSURE < 80 MM HG: ICD-10-PCS | Mod: HCNC,CPTII,S$GLB, | Performed by: INTERNAL MEDICINE

## 2022-02-01 PROCEDURE — 3044F PR MOST RECENT HEMOGLOBIN A1C LEVEL <7.0%: ICD-10-PCS | Mod: HCNC,CPTII,S$GLB, | Performed by: INTERNAL MEDICINE

## 2022-02-01 PROCEDURE — 99999 PR PBB SHADOW E&M-EST. PATIENT-LVL V: ICD-10-PCS | Mod: PBBFAC,HCNC,, | Performed by: INTERNAL MEDICINE

## 2022-02-01 PROCEDURE — 3008F PR BODY MASS INDEX (BMI) DOCUMENTED: ICD-10-PCS | Mod: S$GLB,,, | Performed by: RADIOLOGY

## 2022-02-01 PROCEDURE — 3074F PR MOST RECENT SYSTOLIC BLOOD PRESSURE < 130 MM HG: ICD-10-PCS | Mod: HCNC,CPTII,S$GLB, | Performed by: INTERNAL MEDICINE

## 2022-02-01 PROCEDURE — 1101F PT FALLS ASSESS-DOCD LE1/YR: CPT | Mod: S$GLB,,, | Performed by: RADIOLOGY

## 2022-02-01 PROCEDURE — 3044F HG A1C LEVEL LT 7.0%: CPT | Mod: HCNC,CPTII,S$GLB, | Performed by: INTERNAL MEDICINE

## 2022-02-01 PROCEDURE — 1101F PR PT FALLS ASSESS DOC 0-1 FALLS W/OUT INJ PAST YR: ICD-10-PCS | Mod: HCNC,CPTII,S$GLB, | Performed by: INTERNAL MEDICINE

## 2022-02-01 PROCEDURE — 3288F FALL RISK ASSESSMENT DOCD: CPT | Mod: HCNC,CPTII,S$GLB, | Performed by: INTERNAL MEDICINE

## 2022-02-01 PROCEDURE — 3288F PR FALLS RISK ASSESSMENT DOCUMENTED: ICD-10-PCS | Mod: HCNC,CPTII,S$GLB, | Performed by: INTERNAL MEDICINE

## 2022-02-01 PROCEDURE — 1160F PR REVIEW ALL MEDS BY PRESCRIBER/CLIN PHARMACIST DOCUMENTED: ICD-10-PCS | Mod: S$GLB,,, | Performed by: RADIOLOGY

## 2022-02-01 PROCEDURE — 99999 PR PBB SHADOW E&M-EST. PATIENT-LVL V: CPT | Mod: PBBFAC,HCNC,, | Performed by: INTERNAL MEDICINE

## 2022-02-01 PROCEDURE — 99215 OFFICE O/P EST HI 40 MIN: CPT | Mod: S$GLB,,, | Performed by: RADIOLOGY

## 2022-02-01 PROCEDURE — 3074F SYST BP LT 130 MM HG: CPT | Mod: HCNC,CPTII,S$GLB, | Performed by: INTERNAL MEDICINE

## 2022-02-01 PROCEDURE — 3078F DIAST BP <80 MM HG: CPT | Mod: HCNC,CPTII,S$GLB, | Performed by: INTERNAL MEDICINE

## 2022-02-01 PROCEDURE — 1159F MED LIST DOCD IN RCRD: CPT | Mod: S$GLB,,, | Performed by: RADIOLOGY

## 2022-02-01 PROCEDURE — 3008F BODY MASS INDEX DOCD: CPT | Mod: S$GLB,,, | Performed by: RADIOLOGY

## 2022-02-01 PROCEDURE — 3044F HG A1C LEVEL LT 7.0%: CPT | Mod: S$GLB,,, | Performed by: RADIOLOGY

## 2022-02-01 PROCEDURE — 99215 PR OFFICE/OUTPT VISIT, EST, LEVL V, 40-54 MIN: ICD-10-PCS | Mod: S$GLB,,, | Performed by: RADIOLOGY

## 2022-02-01 PROCEDURE — 3008F BODY MASS INDEX DOCD: CPT | Mod: HCNC,CPTII,S$GLB, | Performed by: INTERNAL MEDICINE

## 2022-02-01 PROCEDURE — 3008F PR BODY MASS INDEX (BMI) DOCUMENTED: ICD-10-PCS | Mod: HCNC,CPTII,S$GLB, | Performed by: INTERNAL MEDICINE

## 2022-02-01 PROCEDURE — 3078F PR MOST RECENT DIASTOLIC BLOOD PRESSURE < 80 MM HG: ICD-10-PCS | Mod: S$GLB,,, | Performed by: RADIOLOGY

## 2022-02-01 PROCEDURE — 3288F FALL RISK ASSESSMENT DOCD: CPT | Mod: S$GLB,,, | Performed by: RADIOLOGY

## 2022-02-01 PROCEDURE — 1160F RVW MEDS BY RX/DR IN RCRD: CPT | Mod: S$GLB,,, | Performed by: RADIOLOGY

## 2022-02-01 PROCEDURE — 99213 PR OFFICE/OUTPT VISIT, EST, LEVL III, 20-29 MIN: ICD-10-PCS | Mod: HCNC,S$GLB,, | Performed by: INTERNAL MEDICINE

## 2022-02-01 PROCEDURE — 3044F PR MOST RECENT HEMOGLOBIN A1C LEVEL <7.0%: ICD-10-PCS | Mod: S$GLB,,, | Performed by: RADIOLOGY

## 2022-02-01 PROCEDURE — 1111F DSCHRG MED/CURRENT MED MERGE: CPT | Mod: HCNC,CPTII,S$GLB, | Performed by: INTERNAL MEDICINE

## 2022-02-01 PROCEDURE — 1101F PT FALLS ASSESS-DOCD LE1/YR: CPT | Mod: HCNC,CPTII,S$GLB, | Performed by: INTERNAL MEDICINE

## 2022-02-01 PROCEDURE — 3074F SYST BP LT 130 MM HG: CPT | Mod: S$GLB,,, | Performed by: RADIOLOGY

## 2022-02-01 PROCEDURE — 1126F AMNT PAIN NOTED NONE PRSNT: CPT | Mod: HCNC,CPTII,S$GLB, | Performed by: INTERNAL MEDICINE

## 2022-02-01 PROCEDURE — 1126F PR PAIN SEVERITY QUANTIFIED, NO PAIN PRESENT: ICD-10-PCS | Mod: S$GLB,,, | Performed by: RADIOLOGY

## 2022-02-01 PROCEDURE — 1126F PR PAIN SEVERITY QUANTIFIED, NO PAIN PRESENT: ICD-10-PCS | Mod: HCNC,CPTII,S$GLB, | Performed by: INTERNAL MEDICINE

## 2022-02-01 PROCEDURE — 3078F DIAST BP <80 MM HG: CPT | Mod: S$GLB,,, | Performed by: RADIOLOGY

## 2022-02-01 PROCEDURE — 99213 OFFICE O/P EST LOW 20 MIN: CPT | Mod: HCNC,S$GLB,, | Performed by: INTERNAL MEDICINE

## 2022-02-01 PROCEDURE — 3288F PR FALLS RISK ASSESSMENT DOCUMENTED: ICD-10-PCS | Mod: S$GLB,,, | Performed by: RADIOLOGY

## 2022-02-01 PROCEDURE — 3074F PR MOST RECENT SYSTOLIC BLOOD PRESSURE < 130 MM HG: ICD-10-PCS | Mod: S$GLB,,, | Performed by: RADIOLOGY

## 2022-02-01 PROCEDURE — 1111F PR DISCHARGE MEDS RECONCILED W/ CURRENT OUTPATIENT MED LIST: ICD-10-PCS | Mod: S$GLB,,, | Performed by: RADIOLOGY

## 2022-02-01 NOTE — PROGRESS NOTES
Service Date:  2/1/22    Chief Complaint: xiang Batres Jr. is a 71 y.o. male referred here by Dr. Noel for laryngeal cancer.      Oncological history is as followed:  10/30/2020: completion of IMRT to larynx and bilateral necks via SiB totaling 6996 cGy.  Diagnosed as a T2 N0 M0 lesion at that time, stage II.   3/16/2021: SML left VF resection encompassing AC; margins negative on frozen, some margins positive on permanent  4/1/2021: SML KTP left transmuscular cordectomy, anterior right sublig resection encompassing AC; left TVF clear with negative margin;  right TVF frozen margins negative but positive on permanent  5/6/2021: SML KTP transmusc resection anterior right TVF; negative for carcinoma  1/6/2022: s/p Total laryngectomy; T4aN0 +PNI    Recovering well from surgery    Review of Systems   Constitutional: Negative.    HENT: Negative.    Eyes: Negative.    Respiratory: Negative.    Cardiovascular: Negative.    Gastrointestinal: Negative.    Endocrine: Negative.    Genitourinary: Negative.    Neurological: Negative.    Hematological: Negative.    Psychiatric/Behavioral: Negative.         Current Outpatient Medications   Medication Instructions    apixaban (ELIQUIS) 5 mg, Oral, 2 times daily    aspirin (ECOTRIN) 81 MG EC tablet 1 tablet, Oral, Daily    blood sugar diagnostic (BLOOD GLUCOSE TEST) Strp 1 strip, Misc.(Non-Drug; Combo Route), 2 times daily before meals    calcitrioL (ROCALTROL) 0.5 mcg, Oral, Daily    calcium carbonate 500 mg/5 mL (1,250 mg/5 mL) Take 10 mLs (1,000 mg total) by G Tube route 3 (three) times daily. Take 1000 mg (10 mLs) of calcium 3 times daily for 1 week, then 1000 mg (10 mLs) twice daily x 1 week, 10 mLs once daily x 1 week, then stop.    ciclopirox (LOPROX) 0.77 % Crea Topical (Top), 2 times daily    diltiaZEM (CARDIZEM) 30 MG tablet take 1 tablet (30 mg total) by Per G Tube route every 6 (six) hours.    docusate (COLACE) 50 mg/5 mL liquid take 10 mLs (100 mg  "total) by Per G Tube route 2 (two) times daily.    ergocalciferol (ERGOCALCIFEROL) 50,000 unit Cap TAKE 1 CAPSULE TWICE WEEKLY    famotidine (PEPCID) 20 MG tablet take 1 tablet (20 mg total) by Per G Tube route 2 (two) times daily.    levothyroxine (SYNTHROID) 112 MCG tablet take 1 tablet (112 mcg total) by Per G Tube route before breakfast. Take 4 hours before first dose of calcium and 3 hours before tube feed    losartan (COZAAR) 100 MG tablet TAKE 1 TABLET BY MOUTH EVERY DAY    oxyCODONE (ROXICODONE) 5 mg/5 mL Soln take 5 mLs (5 mg total) by Per G Tube route every 6 (six) hours as needed (pain).    pen needle, diabetic (BD ULTRA-FINE YULISSA PEN NEEDLE) 32 gauge x 5/32" Ndle Use once weekly.    polyethylene glycol (GLYCOLAX) 17 gram/dose powder Dissolve 1 capful (17 grams) in liquid and give by Per G Tube route once daily.   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        Past Surgical History:   Procedure Laterality Date    DIRECT LARYNGOBRONCHOSCOPY N/A 12/27/2021    Procedure: LARYNGOSCOPY, DIRECT, WITH BRONCHOSCOPY;  Surgeon: Flex Espinosa MD;  Location: [address];  Service: ENT;  Laterality: N/A;    DISSECTION OF NECK Bilateral 1/6/2022    Procedure: DISSECTION, NECK;  Surgeon: Jesse James MD;  Location: [facility] OR [address] [place] [address];  Service: ENT;  Laterality: Bilateral;    FLAP PROCEDURE Right 1/6/2022    Procedure: CREATION, FREE FLAP;  Surgeon: Alise Hart MD;  Location: [facility] OR [address] [place] [address];  Service: ENT;  Laterality: Right;  Ischemic start 1351  Ischemic stop 1502    LARYNGECTOMY N/A 1/6/2022    Procedure: LARYNGECTOMY;  Surgeon: Jesse James MD;  Location: [facility] OR [address] [place] [address];  Service: ENT;  Laterality: N/A;    LARYNGOSCOPY N/A " 8/4/2020    Procedure: Suspension microlaryngoscopy with biopsy, possible KTP laser treatment/excision;  Surgeon: Stew Noel MD;  [facility];  Service: ENT;  Laterality: N/A;  Microscope, telescopes, tower, microinstruments, KTP laser, rep conf# [id number] IC 7/28.    LARYNGOSCOPY N/A 3/16/2021    Procedure: Suspension microlaryngoscopy with excision of lesion, possible CO2 laser;  Surgeon: Stew Noel MD;  [facility];  Service: ENT;  Laterality: N/A;  Microscope, telescopes, tower, microinstruments, CO2 laser, rep conf# [id number] IC 3/4.    LARYNGOSCOPY N/A 4/1/2021    Procedure: Suspension microlaryngoscopy with KTP laser excision of lesion;  Surgeon: Stew Noel MD;  Location: [address];  Service: ENT;  Laterality: N/A;  Microscope, telescopes, tower, microinstruments, 70 degree scope, vocal fold , KTP laser, rep conf# [id number] BC    LARYNGOSCOPY N/A 12/9/2021    Procedure: Suspension microlaryngoscopy with biopsy;  Surgeon: Stew Noel MD;  Location: [facility] OR [address] [place] [address];  Service: ENT;  Laterality: N/A;  Microscope, telescopes, tower, microinstruments    LARYNGOSCOPY N/A 1/6/2022    Procedure: LARYNGOSCOPY;  Surgeon: Jesse James MD;  Location: [address];  Service: ENT;  Laterality: N/A;    MICROLARYNGOSCOPY N/A 3/17/2020    Procedure: MICROLARYNGOSCOPY;  Surgeon: Jung Xiao MD;  Location: [facility];  Service: ENT;  Laterality: N/A;  Laser Microlaryngoscopy  NEED TO SCHEDULE LASER from [facility] eventuosity [id number]    REIMPLANTATION OF PARATHYROID TISSUE N/A 1/6/2022    Procedure: REIMPLANTATION, PARATHYROID TISSUE;  Surgeon: Jesse James MD;  Location: [facility] OR [address] [place] [address];  Service: ENT;  Laterality: N/A;    THYROIDECTOMY  1/6/2022    Procedure: THYROIDECTOMY;  Surgeon: Jesse James MD;  Location: [facility] OR [address] [place] [address];  Service: ENT;;    TRACHEOSTOMY N/A 12/27/2021    Procedure: CREATION, TRACHEOSTOMY;  Surgeon:  "Flex Espinosa MD;  Location: [facility] OR [address] [place] [address];  Service: ENT;  Laterality: N/A;        Family History   Problem Relation Age of Onset    Abnormal EKG Mother     Diabetes Father     Heart disease Father     Hypertension Father        Social History     Tobacco Use    Smoking status: Never Smoker    Smokeless tobacco: Never Used   Substance Use Topics    Alcohol use: Not Currently     Comment: occasional    Drug use: No         Vitals:    02/01/22 1409   BP: 110/64   Pulse: 84   Resp: 19   Temp: 97.3 °F (36.3 °C)        Physical Exam:  /64 (BP Location: Right arm, Patient Position: Sitting, BP Method: Medium (Automatic))   Pulse 84   Temp 97.3 °F (36.3 °C) (Temporal)   Resp 19   Ht 5' 6" (1.676 m)   Wt 60.8 kg (134 lb 0.6 oz)   SpO2 99%   BMI 21.63 kg/m²     Physical Exam  Vitals and nursing note reviewed.   Constitutional:       Appearance: Normal appearance.   HENT:      Head: Normocephalic and atraumatic.      Nose: Nose normal.      Mouth/Throat:      Mouth: Mucous membranes are moist.      Pharynx: Oropharynx is clear.   Eyes:      Extraocular Movements: Extraocular movements intact.      Conjunctiva/sclera: Conjunctivae normal.   Cardiovascular:      Rate and Rhythm: Normal rate and regular rhythm.      Heart sounds: Normal heart sounds.   Pulmonary:      Effort: Pulmonary effort is normal.      Breath sounds: Normal breath sounds.   Abdominal:      General: Abdomen is flat. Bowel sounds are normal.      Palpations: Abdomen is soft.   Musculoskeletal:         General: Normal range of motion.      Cervical back: Normal range of motion and neck supple.   Skin:     General: Skin is warm and dry.   Neurological:      General: No focal deficit present.      Mental Status: He is alert and oriented to person, place, and time. Mental status is at baseline.   Psychiatric:         Mood and Affect: Mood normal.          Labs:  Lab Results   Component Value Date    WBC 8.88 01/14/2022    RBC 3.10 (L) " 01/14/2022    HGB 8.7 (L) 01/14/2022    HCT 26.7 (L) 01/14/2022    MCV 86 01/14/2022    MCH 28.1 01/14/2022    MCHC 32.6 01/14/2022    RDW 13.1 01/14/2022     01/14/2022    MPV 9.8 01/14/2022    GRAN 7.3 01/14/2022    GRAN 82.8 (H) 01/14/2022    LYMPH 0.7 (L) 01/14/2022    LYMPH 8.2 (L) 01/14/2022    MONO 0.5 01/14/2022    MONO 5.7 01/14/2022    EOS 0.2 01/14/2022    BASO 0.03 01/14/2022    EOSINOPHIL 1.9 01/14/2022    BASOPHIL 0.3 01/14/2022     Sodium   Date Value Ref Range Status   01/14/2022 137 136 - 145 mmol/L Final     Potassium   Date Value Ref Range Status   01/14/2022 4.4 3.5 - 5.1 mmol/L Final     Chloride   Date Value Ref Range Status   01/14/2022 101 95 - 110 mmol/L Final     CO2   Date Value Ref Range Status   01/14/2022 29 23 - 29 mmol/L Final     Glucose   Date Value Ref Range Status   01/14/2022 140 (H) 70 - 110 mg/dL Final     BUN   Date Value Ref Range Status   01/14/2022 27 (H) 8 - 23 mg/dL Final     Creatinine   Date Value Ref Range Status   01/14/2022 0.9 0.5 - 1.4 mg/dL Final     Calcium   Date Value Ref Range Status   01/28/2022 10.0 8.7 - 10.5 mg/dL Final     Total Protein   Date Value Ref Range Status   01/07/2022 4.3 (L) 6.0 - 8.4 g/dL Final     Albumin   Date Value Ref Range Status   01/07/2022 1.9 (L) 3.5 - 5.2 g/dL Final   11/18/2020 3.7 3.6 - 5.1 g/dL Final     Comment:     For additional information, please refer to   http://education.[employer].WebPesados/faq/ILF921 (This link is   being provided for informational/ educational purposes only.)  This test was developed and its analytical performance   characteristics have been determined by [facility]. It has not been cleared or approved by the   [place] Food and Drug Administration. This assay has been validated   pursuant to the CLIA regulations and is used for clinical   purposes.  @ Test Performed By:  [facility]  Yo Cortes M.D.,   [address] [place] [address]  [facility]  [id number]       Total Bilirubin   Date Value Ref Range Status   01/07/2022 0.6 0.1 - 1.0 mg/dL Final     Comment:     For infants and newborns, interpretation of results should be based  on gestational age, weight and in agreement with clinical  observations.    Premature Infant recommended reference ranges:  Up to 24 hours.............<8.0 mg/dL  Up to 48 hours............<12.0 mg/dL  3-5 days..................<15.0 mg/dL  6-29 days.................<15.0 mg/dL       Alkaline Phosphatase   Date Value Ref Range Status   01/07/2022 126 55 - 135 U/L Final     AST   Date Value Ref Range Status   01/07/2022 36 10 - 40 U/L Final     Comment:     *Result may be interfered by visible hemolysis     ALT   Date Value Ref Range Status   01/07/2022 19 10 - 44 U/L Final     Anion Gap   Date Value Ref Range Status   01/14/2022 7 (L) 8 - 16 mmol/L Final     eGFR if    Date Value Ref Range Status   01/14/2022 >60.0 >60 mL/min/1.73 m^2 Final     eGFR if non    Date Value Ref Range Status   01/14/2022 >60.0 >60 mL/min/1.73 m^2 Final     Comment:     Calculation used to obtain the estimated glomerular filtration  rate (eGFR) is the CKD-EPI equation.          A/P:    Recurrent laryngeal cancer  -s/p total laryngectomy on 1/6/22  -patient is recovering well from surgery  -no indication for adjuvant radiation or concurrent chemoradiation.  Patient has already received radiation to this area in the past.  -patient will follow-up with ENT  -return to clinic p.r.n. per patient request.  If he has any worsening I asked him to come in sooner.    Aurash Khoobehi, MD  Hematology and Oncology

## 2022-02-01 NOTE — PROGRESS NOTES
Cyrus Batres Jr.  [id number]  1950 2/1/2022  Stew Noel Md  [address] [place] [address]    DIAGNOSIS: Cancer Staging  Larynx neoplasm malignant  Staging form: Larynx - Glottis, AJCC 8th Edition  - Clinical stage from 8/6/2020: Stage II (cT2, cN0, cM0) - Signed by Fariha Muñoz NP on 8/6/2020  - Pathologic stage from 1/20/2022: Stage LOU (pT4a, pN0, cM0) - Signed by Fariha Muñoz NP on 1/20/2022    REASON FOR VISIT: Routine scheduled follow-up.    HISTORY OF PRESENT ILLNESS:   71 y.o. male never smoker who presented with intermittent hoarseness and weakening of the voice throughout the day with persistent cough. He has a +FHx of similar diagnosis in his father who was a smoker (he reports benign--laryngeal polyps?).    Patient was evaluated by Dr. Xiao with DFL noting irregularity at L TVC and short interval f/u with microlaryngoscopy where he noted the lesion at the superior surface of the left vocal cord without definitive invasion of the anterior commissure and possibly 1 mm of subglottic extension. Biopsy from the left true vocal cord demonstrated severe dysplastic changes without definitive invasive carcinoma though suspicious.  The left anterior commissure biopsy demonstrated moderate dysplasia without malignancy. CT of the neck and soft tissues appreciating an enhancing 6 mm focus at the superior aspect of the left vocal cord near the anterior commissure.    Follow-up DFL was suspicious for invasive tumor with four month follow-up CT of the neck and soft tissues demonstrating enlargement to 1.4 x 1.1 x 0.7 cm without evidence of pathologic lymphadenopathy.  Dr. Xiao referred to Dr. Noel who performed FLV suspicious for malignancy then microlaryngoscopy noting bulky tumor extending into the left true vocal fold, crossing the anterior commissure to involve the left false vocal cord with infraglottic extent x 8 mm. Aggressive debulking revealed the tumor to have deeply invaded  with pathology from left true vocal cord demonstrating moderately differentiated SCCA without lymphovascular or perineural invasion.    CT of the chest was clear.  His case was reviewed by multidisciplinary tumor board that recommended definitive radiotherapy.  I discussed in detail with Dr. Noel who reports extensive debulking of the tumor.    Mr. Batres completed IMRT to his larynx and bilateral necks via SiB totaling 6996 cGy on 10/30/2020.        INTERVAL HISTORY:   Since completion of radiotherapy patient has continued under the care of Dr. Noel requiring microlaryngoscopy with stripping of persistent disease.  He has continued on surveillance with suggestion of progressive disease and recently presented with hemoptysis.     He was ultimately taken for salvage laryngectomy by Dr. 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   - margin close at 1 mm near right thyroid cartilage   - 0/50 LNs   - pT4aN0    He presents aphonic, denying pain or discomfort with restricted range of motion of the neck and paresthesias in bilateral hands.  He is using PEG tube for feeds and reports good tolerance throughout nausea or indigestion.  He is following with speech therapy as well as dietitian at [facility].  He follows with Dr. Khoobehi as well.    Review of systems otherwise negative unless indicated in HPI/interval history.    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     Past Surgical History:   Procedure Laterality Date    DIRECT LARYNGOBRONCHOSCOPY N/A 12/27/2021    Procedure: LARYNGOSCOPY, DIRECT, WITH BRONCHOSCOPY;  Surgeon: Flex Espinosa MD;  Location: [facility] OR [address] [place] [address];  Service: ENT;  Laterality: N/A;    DISSECTION OF NECK Bilateral 1/6/2022    Procedure: DISSECTION, NECK;  Surgeon: Jesse James MD;   Location: [facility] OR 2ND FLR;  Service: ENT;  Laterality: Bilateral;    FLAP PROCEDURE Right 1/6/2022    Procedure: CREATION, FREE FLAP;  Surgeon: Alise Hart MD;  Location: [facility] OR 2ND FLR;  Service: ENT;  Laterality: Right;  Ischemic start 1351  Ischemic stop 1502    LARYNGECTOMY N/A 1/6/2022    Procedure: LARYNGECTOMY;  Surgeon: Jesse James MD;  Location: [facility] OR [facility] FLR;  Service: ENT;  Laterality: N/A;    LARYNGOSCOPY N/A 8/4/2020    Procedure: Suspension microlaryngoscopy with biopsy, possible KTP laser treatment/excision;  Surgeon: Stew Noel MD;  Location: [facility] OR [facility] FLR;  Service: ENT;  Laterality: N/A;  Microscope, telescopes, tower, microinstruments, KTP laser, rep conf# [id number] IC 7/28.    LARYNGOSCOPY N/A 3/16/2021    Procedure: Suspension microlaryngoscopy with excision of lesion, possible CO2 laser;  Surgeon: Stew Noel MD;  [facility];  Service: ENT;  Laterality: N/A;  Microscope, telescopes, tower, microinstruments, CO2 laser, rep conf# [id number] IC 3/4.    LARYNGOSCOPY N/A 4/1/2021    Procedure: Suspension microlaryngoscopy with KTP laser excision of lesion;  Surgeon: Stew Noel MD;  [facility];  Service: ENT;  Laterality: N/A;  Microscope, telescopes, tower, microinstruments, 70 degree scope, vocal fold , KTP laser, rep conf# [id number] BC    LARYNGOSCOPY N/A 12/9/2021    Procedure: Suspension microlaryngoscopy with biopsy;  Surgeon: Stew Noel MD;  Location: [facility] OR [facility] FLR;  Service: ENT;  Laterality: N/A;  Microscope, telescopes, tower, microinstruments    LARYNGOSCOPY N/A 1/6/2022    Procedure: LARYNGOSCOPY;  Surgeon: Jesse James MD;  Location: [facility];  Service: ENT;  Laterality: N/A;    MICROLARYNGOSCOPY N/A 3/17/2020    Procedure: MICROLARYNGOSCOPY;  Surgeon: Jung Xiao MD;  Location: [facility];  Service: ENT;  Laterality: N/A;  Laser Microlaryngoscopy  NEED TO SCHEDULE LASER from  [facility] [id number]    REIMPLANTATION OF PARATHYROID TISSUE N/A 1/6/2022    Procedure: REIMPLANTATION, PARATHYROID TISSUE;  Surgeon: Jesse James MD;  Location: [facility] OR 2ND FLR;  Service: ENT;  Laterality: N/A;    THYROIDECTOMY  1/6/2022    Procedure: THYROIDECTOMY;  Surgeon: Jesse James MD;  Location: [facility] OR 2ND FLR;  Service: ENT;;    TRACHEOSTOMY N/A 12/27/2021    Procedure: CREATION, TRACHEOSTOMY;  Surgeon: Flex Espinosa MD;  Location: [facility] OR 2ND FLR;  Service: ENT;  Laterality: N/A;     Social History     Socioeconomic History    Marital status:      Spouse name: Janel Batres    Number of children: 2   Occupational History    Occupation: [employer]     Employer: [employer]   Tobacco Use    Smoking status: Never Smoker    Smokeless tobacco: Never Used   Substance and Sexual Activity    Alcohol use: Not Currently     Comment: occasional    Drug use: No    Sexual activity: Yes     Partners: Female   Social History Narrative    2 children from his 1st wife     Family History   Problem Relation Age of Onset    Abnormal EKG Mother     Diabetes Father     Heart disease Father     Hypertension Father      Medication List with Changes/Refills   Current Medications    APIXABAN (ELIQUIS) 5 MG TAB    Take 1 tablet (5 mg total) by mouth 2 (two) times daily.    ASPIRIN (ECOTRIN) 81 MG EC TABLET    Take 1 tablet by mouth Daily.    BLOOD SUGAR DIAGNOSTIC (BLOOD GLUCOSE TEST) STRP    1 strip by Misc.(Non-Drug; Combo Route) route 2 (two) times daily before meals.    CALCITRIOL (ROCALTROL) 1 MCG/ML SOLUTION    Take 0.5 mLs (0.5 mcg total) by mouth once daily.    CALCIUM CARBONATE 500 MG/5 ML (1,250 MG/5 ML)    Take 10 mLs (1,000 mg total) by G Tube route 3 (three) times daily. Take 1000 mg (10 mLs) of calcium 3 times daily for 1 week, then 1000 mg (10 mLs) twice daily x 1 week, 10 mLs once daily x 1 week, then stop.    CICLOPIROX (LOPROX) 0.77 % CREA    Apply topically 2 (two)  "times daily.    DILTIAZEM (CARDIZEM) 30 MG TABLET    take 1 tablet (30 mg total) by Per G Tube route every 6 (six) hours.    DOCUSATE (COLACE) 50 MG/5 ML LIQUID    take 10 mLs (100 mg total) by Per G Tube route 2 (two) times daily.    ERGOCALCIFEROL (ERGOCALCIFEROL) 50,000 UNIT CAP    TAKE 1 CAPSULE TWICE WEEKLY    FAMOTIDINE (PEPCID) 20 MG TABLET    take 1 tablet (20 mg total) by Per G Tube route 2 (two) times daily.    LEVOTHYROXINE (SYNTHROID) 112 MCG TABLET    take 1 tablet (112 mcg total) by Per G Tube route before breakfast. Take 4 hours before first dose of calcium and 3 hours before tube feed    LOSARTAN (COZAAR) 100 MG TABLET    TAKE 1 TABLET BY MOUTH EVERY DAY    OXYCODONE (ROXICODONE) 5 MG/5 ML SOLN    take 5 mLs (5 mg total) by Per G Tube route every 6 (six) hours as needed (pain).    PEN NEEDLE, DIABETIC (BD ULTRA-FINE YULISSA PEN NEEDLE) 32 GAUGE X 5/32" NDLE    Use once weekly.    POLYETHYLENE GLYCOL (GLYCOLAX) 17 GRAM/DOSE POWDER    Dissolve 1 capful (17 grams) in liquid and give by Per G Tube route once daily.    SENNOSIDES 8.8 MG/5 ML (SENOKOT) 8.8 MG/5 ML SYRUP    5 mLs by Per G Tube route daily as needed (constipation).     Review of patient's allergies indicates:   Allergen Reactions    Pollen extracts     Lovastatin Rash     Not confirmed but pt skeptical       QUALITY OF LIFE: 90%- Able to Carry on Normal Activity: Minor Symptoms of Disease    Vitals:    02/01/22 1302   BP: 118/73   Pulse: 84   Resp: 16   Temp: 98.3 °F (36.8 °C)   TempSrc: Oral   SpO2: 100%   Weight: 60.8 kg (134 lb 1.6 oz)   PainSc: 0-No pain     Body mass index is 21.64 kg/m².    PHYSICAL EXAM:   GENERAL: alert; in no apparent distress.  Thin, not ill-appearing; aphonic  HEAD: normocephalic, atraumatic.  EYES: pupils are equal, round, reactive to light and accommodation. Sclera anicteric. Conjunctiva not injected.   NOSE/THROAT: no nasal erythema or rhinorrhea. Oropharynx pink, without erythema, ulcerations or thrush.   NECK: "  Mild cervical motion rigidity with well-healed incisions, graft well vascularized; trach  CHEST: Patient is speaking comfortably on room air with normal work of breathing without using accessory muscles of respiration.  CARDIOVASCULAR: regular rate and rhythm; no murmurs, rubs or gallops.  ABDOMEN: soft, nontender, nondistended. Bowel sounds present. PEG c/d/i  MUSCULOSKELETAL: no tenderness to palpation along the spine or scapulae. Normal range of motion.  NEUROLOGIC: cranial nerves II-XII intact bilaterally. Strength 5/5 in bilateral upper and lower extremities. No sensory deficits appreciated. Normal gait.  LYMPHATIC: no cervical adenopathy appreciated bilaterally.   EXTREMITIES: no clubbing, cyanosis, edema.  SKIN: no erythema, rashes, ulcerations noted.     ANCILLARY DATA:   12/15/21 PET  IMPRESSION:  Substantial qualitative and quantitative increase of hypermetabolic FDG activity associated with the larynx in this patient with known supraglottic neoplasm.  No evidence of FDG avid metastatic disease involving neck, chest, abdomen or pelvis.    ASSESSMENT: 71 y.o. male with stage [id number] g1-2 SCCa s/p salvage laryngectomy with close R thyroid cartilage margin and prior course of IMRT to his larynx and bilateral necks via SiB totaling 6996 cGy on 10/30/2020 followed by repeat microlaryngoscopy with stripping for persistent disease.  PLAN:   Cyrusdanielle Batres Jr. presents status post salvage laryngectomy.  I reviewed his pathology in detail which reveals a T4a specimen that was lymph node negative with a single close margin at the right thyroid cartilage.  He is not a candidate for further radiotherapy and he will follow-up with Dr. James for postoperative surveillance.  He will also follow with Dr. Khoobehi.  I do not anticipate adjuvant systemic therapy.  Today I advised following with both speech therapy and dietitian recommendations and he requested transfer to [facility] for this care--I directed him to  discuss this at his next visit.    He will follow-up with ENT and return to clinic as needed.    All questions answered and contact information provided. Patient understands free to call us anytime with any questions or concerns regarding radiation therapy.    I have personally seen and evaluated this patient with a moderate to high complexity diagnosis.      Greater than 45 minutes were dedicated to reviewing/interpreting pertinent laboratory/imaging/pathology as well as follow-up with concurrent consultants; reviewing and performing history and physical; counseling patient on continuing oncologic recommendations; documentation in the electronic medical record including ordering of additional tests and/or radiation treatment protocol; and coordination of care with physicians with referrals placed as appropriate.     COVID-19 precautions discussed. Cancer Center policy for COVID-19 testing described.  Patient will be required to wear a mask when in the Cancer Center.    PHYSICIAN: Matheus Portillo Jr, MD

## 2022-02-03 ENCOUNTER — PATIENT MESSAGE (OUTPATIENT)
Dept: OTOLARYNGOLOGY | Facility: CLINIC | Age: 72
End: 2022-02-03
Payer: MEDICARE

## 2022-02-03 ENCOUNTER — HOSPITAL ENCOUNTER (OUTPATIENT)
Dept: RADIOLOGY | Facility: HOSPITAL | Age: 72
Discharge: HOME | End: 2022-02-03
Attending: NURSE PRACTITIONER
Payer: MEDICARE

## 2022-02-03 DIAGNOSIS — Z90.02 S/P LARYNGECTOMY: ICD-10-CM

## 2022-02-03 PROCEDURE — 74220 X-RAY XM ESOPHAGUS 1CNTRST: CPT | Mod: TC

## 2022-02-08 ENCOUNTER — PATIENT MESSAGE (OUTPATIENT)
Dept: OTOLARYNGOLOGY | Facility: CLINIC | Age: 72
End: 2022-02-08
Payer: MEDICARE

## 2022-02-08 DIAGNOSIS — Z90.02 S/P LARYNGECTOMY: Primary | ICD-10-CM

## 2022-02-08 RX ORDER — LEVOTHYROXINE SODIUM 112 UG/1
112 TABLET ORAL
Qty: 30 TABLET | Refills: 11 | Status: SHIPPED | OUTPATIENT
Start: 2022-02-08 | End: 2022-02-18

## 2022-02-11 ENCOUNTER — PATIENT MESSAGE (OUTPATIENT)
Dept: OTOLARYNGOLOGY | Facility: CLINIC | Age: 72
End: 2022-02-11
Payer: MEDICARE

## 2022-02-14 ENCOUNTER — LAB VISIT (OUTPATIENT)
Dept: LAB | Facility: HOSPITAL | Age: 72
End: 2022-02-14
Attending: PHYSICIAN ASSISTANT
Payer: MEDICARE

## 2022-02-14 DIAGNOSIS — E11.9 TYPE 2 DIABETES MELLITUS WITHOUT COMPLICATION, WITH LONG-TERM CURRENT USE OF INSULIN: ICD-10-CM

## 2022-02-14 DIAGNOSIS — Z79.4 TYPE 2 DIABETES MELLITUS WITHOUT COMPLICATION, WITH LONG-TERM CURRENT USE OF INSULIN: ICD-10-CM

## 2022-02-14 DIAGNOSIS — E55.9 HYPOVITAMINOSIS D: ICD-10-CM

## 2022-02-14 LAB
25(OH)D3+25(OH)D2 SERPL-MCNC: 37 NG/ML (ref 30–96)
ALBUMIN SERPL BCP-MCNC: 3.7 G/DL (ref 3.5–5.2)
ALP SERPL-CCNC: 95 U/L (ref 55–135)
ALT SERPL W/O P-5'-P-CCNC: 21 U/L (ref 10–44)
ANION GAP SERPL CALC-SCNC: 8 MMOL/L (ref 8–16)
AST SERPL-CCNC: 21 U/L (ref 10–40)
BILIRUB SERPL-MCNC: 1.1 MG/DL (ref 0.1–1)
BUN SERPL-MCNC: 18 MG/DL (ref 8–23)
CALCIUM SERPL-MCNC: 9.6 MG/DL (ref 8.7–10.5)
CHLORIDE SERPL-SCNC: 104 MMOL/L (ref 95–110)
CHOLEST SERPL-MCNC: 144 MG/DL (ref 120–199)
CHOLEST/HDLC SERPL: 3 {RATIO} (ref 2–5)
CO2 SERPL-SCNC: 28 MMOL/L (ref 23–29)
CREAT SERPL-MCNC: 0.8 MG/DL (ref 0.5–1.4)
EST. GFR  (AFRICAN AMERICAN): >60 ML/MIN/1.73 M^2
EST. GFR  (NON AFRICAN AMERICAN): >60 ML/MIN/1.73 M^2
ESTIMATED AVG GLUCOSE: 108 MG/DL (ref 68–131)
GLUCOSE SERPL-MCNC: 129 MG/DL (ref 70–110)
HBA1C MFR BLD: 5.4 % (ref 4–5.6)
HDLC SERPL-MCNC: 48 MG/DL (ref 40–75)
HDLC SERPL: 33.3 % (ref 20–50)
LDLC SERPL CALC-MCNC: 75.4 MG/DL (ref 63–159)
NONHDLC SERPL-MCNC: 96 MG/DL
POTASSIUM SERPL-SCNC: 4.6 MMOL/L (ref 3.5–5.1)
PROT SERPL-MCNC: 7 G/DL (ref 6–8.4)
SODIUM SERPL-SCNC: 140 MMOL/L (ref 136–145)
TRIGL SERPL-MCNC: 103 MG/DL (ref 30–150)

## 2022-02-14 PROCEDURE — 36415 COLL VENOUS BLD VENIPUNCTURE: CPT | Mod: HCNC,PO | Performed by: PHYSICIAN ASSISTANT

## 2022-02-14 PROCEDURE — 82306 VITAMIN D 25 HYDROXY: CPT | Mod: HCNC | Performed by: PHYSICIAN ASSISTANT

## 2022-02-14 PROCEDURE — 83036 HEMOGLOBIN GLYCOSYLATED A1C: CPT | Mod: HCNC | Performed by: PHYSICIAN ASSISTANT

## 2022-02-14 PROCEDURE — 80061 LIPID PANEL: CPT | Mod: HCNC | Performed by: PHYSICIAN ASSISTANT

## 2022-02-14 PROCEDURE — 80053 COMPREHEN METABOLIC PANEL: CPT | Mod: HCNC | Performed by: PHYSICIAN ASSISTANT

## 2022-02-16 NOTE — PROGRESS NOTES
"Oncology Nutrition Assessment for Medical Nutrition Therapy  Follow up Visit    Cyrus Batres Jr.   1950    Referring Provider:  No ref. 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 Most recently underwent bilateral neck dissection, total laryngectomy, total thyroidectomy, and free flap 1/6. He was discharged on TF of Glucerna 1.5 bolus 5 cartons/day. Previously seen by Diana Huffman for outpatient assessment. Here today for follow up.   He is now cleared to follow a soft diet. Was previously doing liquids and purees. Wife is vegan and prepares most of the food. Was doing pureed soups, smoothies/shakes, drinking Glucerna 1.5 x5 daily. Not usually using the PEG tube (taking supplements PO). Drinking coffee and water, but only small amounts.   His blood sugars are now well controlled. Has questions regarding calcium intake due to previous hypercalcemia.      Weight:62.3 kg (137 lb 5.6 oz)  Height:5' 6" (1.676 m)  BMI:Body mass index is 22.17 kg/m².   IBW: Ideal body weight: 63.8 kg (140 lb 10.5 oz)    Usual BW: 140-145lb  Weight Change: up 3lb from previous visit; still down about 8lb from usual weight    Allergies: Pollen extracts and Lovastatin    Current Medications:    Current Outpatient Medications:     apixaban (ELIQUIS) 5 mg Tab, Take 1 tablet (5 mg total) by mouth 2 (two) times daily., Disp: 180 tablet, Rfl: 2    aspirin (ECOTRIN) 81 MG EC tablet, Take 1 tablet by mouth Daily., Disp: , Rfl:     blood sugar diagnostic (BLOOD GLUCOSE TEST) Strp, 1 strip by Misc.(Non-Drug; Combo Route) route 2 (two) times daily before meals., Disp: " "200 strip, Rfl: 2    calcitrioL (ROCALTROL) 1 mcg/mL solution, Take 0.5 mLs (0.5 mcg total) by mouth once daily., Disp: 15 mL, Rfl: 0    calcium carbonate 500 mg/5 mL (1,250 mg/5 mL), Take 10 mLs (1,000 mg total) by G Tube route 3 (three) times daily. Take 1000 mg (10 mLs) of calcium 3 times daily for 1 week, then 1000 mg (10 mLs) twice daily x 1 week, 10 mLs once daily x 1 week, then stop., Disp: 473 mL, Rfl: 0    ciclopirox (LOPROX) 0.77 % Crea, Apply topically 2 (two) times daily., Disp: 1 Tube, Rfl: 2    diltiaZEM (CARDIZEM) 30 MG tablet, take 1 tablet (30 mg total) by Per G Tube route every 6 (six) hours., Disp: 120 tablet, Rfl: 2    docusate (COLACE) 50 mg/5 mL liquid, take 10 mLs (100 mg total) by Per G Tube route 2 (two) times daily., Disp: 473 mL, Rfl: 1    ergocalciferol (ERGOCALCIFEROL) 50,000 unit Cap, TAKE 1 CAPSULE TWICE WEEKLY, Disp: 24 capsule, Rfl: 3    famotidine (PEPCID) 20 MG tablet, take 1 tablet (20 mg total) by Per G Tube route 2 (two) times daily., Disp: 60 tablet, Rfl: 11    ipratropium (ATROVENT) 21 mcg (0.03 %) nasal spray, 2 sprays by Nasal route 2 (two) times daily., Disp: 30 mL, Rfl: 11    levothyroxine (SYNTHROID) 112 MCG tablet, take 1 tablet (112 mcg total) by Per G Tube route before breakfast. Take 4 hours before first dose of calcium and 3 hours before tube feed, Disp: 30 tablet, Rfl: 11    levothyroxine (SYNTHROID) 112 MCG tablet, Take 1 tablet (112 mcg total) by mouth before breakfast., Disp: 30 tablet, Rfl: 11    losartan (COZAAR) 100 MG tablet, TAKE 1 TABLET BY MOUTH EVERY DAY (Patient taking differently: Take 100 mg by mouth every evening.), Disp: 90 tablet, Rfl: 3    oxyCODONE (ROXICODONE) 5 mg/5 mL Soln, take 5 mLs (5 mg total) by Per G Tube route every 6 (six) hours as needed (pain)., Disp: 473 mL, Rfl: 0    pen needle, diabetic (BD ULTRA-FINE YULISSA PEN NEEDLE) 32 gauge x 5/32" Ndle, Use once weekly., Disp: 30 each, Rfl: 1    polyethylene glycol (GLYCOLAX) 17 " gram/dose powder, Dissolve 1 capful (17 grams) in liquid and give by Per G Tube route once daily., Disp: 510 g, Rfl: 0    sennosides 8.8 mg/5 ml (SENOKOT) 8.8 mg/5 mL syrup, 5 mLs by Per G Tube route daily as needed (constipation)., Disp: 150 mL, Rfl: 0  No current facility-administered medications for this visit.    Facility-Administered Medications Ordered in Other Visits:     lactated ringers infusion, , Intravenous, Continuous, Torsten Hilton MD    Labs: Reviewed from 2/14- glucose 129, A1c 5.4    Nutrition Diagnosis    Problem: inadequate protein/energy intake  Etiology (related to): post surgical changes   Signs/Symptoms (as evidenced by): requiring tube feeding for nutrition support (improved)     Nutrition Intervention    Nutrition Prescription   8046-7569 Kcals (30-35kcal/kg)  75 g protein (1.2g/kg)   7974-8102 mL fluid (30-35mL/kg)    Recommendations:  Ok to eat normal serving sizes of high calcium foods like milk/dairy, soy, etc.    Avoid calcium supplements and antacids like Tums   Fluids- increase to 3 bottles of water per day   Cut down to 4 Glucerna per day to increase appetite for solid foods   Continue to take supplements PO   Flush PEG tube daily with 60mL water to keep clean and patent   Continue to advance diet per Speech recs     Materials Provided/Reviewed   Discussed he can have his PEG tube removed in about a month (after 4 weeks or more of no use and stable weight)    Nutrition Monitoring and Evaluation    Monitor: diet education needs     Goals: weight gain to baseline, PEG tube removal     Follow up as needed     Communication to referring provider/care team: discussed with provider, note available in chart     Counseling time: 30 Minutes    Melissa Landeros, MPH, RD, , LDN, FAND   [phone]

## 2022-02-17 ENCOUNTER — CLINICAL SUPPORT (OUTPATIENT)
Dept: HEMATOLOGY/ONCOLOGY | Facility: CLINIC | Age: 72
End: 2022-02-17
Payer: MEDICARE

## 2022-02-17 ENCOUNTER — OFFICE VISIT (OUTPATIENT)
Dept: OTOLARYNGOLOGY | Facility: CLINIC | Age: 72
End: 2022-02-17
Payer: MEDICARE

## 2022-02-17 ENCOUNTER — LAB VISIT (OUTPATIENT)
Dept: LAB | Facility: HOSPITAL | Age: 72
End: 2022-02-17
Payer: MEDICARE

## 2022-02-17 ENCOUNTER — CLINICAL SUPPORT (OUTPATIENT)
Dept: SPEECH THERAPY | Facility: HOSPITAL | Age: 72
End: 2022-02-17
Payer: MEDICARE

## 2022-02-17 VITALS
BODY MASS INDEX: 22.17 KG/M2 | SYSTOLIC BLOOD PRESSURE: 110 MMHG | DIASTOLIC BLOOD PRESSURE: 67 MMHG | HEART RATE: 63 BPM | TEMPERATURE: 98 F | WEIGHT: 137.38 LBS

## 2022-02-17 VITALS — BODY MASS INDEX: 22.08 KG/M2 | WEIGHT: 137.38 LBS | HEIGHT: 66 IN

## 2022-02-17 DIAGNOSIS — E11.65 TYPE 2 DIABETES MELLITUS WITH HYPERGLYCEMIA, WITHOUT LONG-TERM CURRENT USE OF INSULIN: ICD-10-CM

## 2022-02-17 DIAGNOSIS — Z71.3 NUTRITIONAL COUNSELING: Primary | ICD-10-CM

## 2022-02-17 DIAGNOSIS — C32.9 LARYNX NEOPLASM MALIGNANT: ICD-10-CM

## 2022-02-17 DIAGNOSIS — C32.0 MALIGNANT NEOPLASM OF TRUE VOCAL CORD: ICD-10-CM

## 2022-02-17 DIAGNOSIS — Z90.02 S/P LARYNGECTOMY: ICD-10-CM

## 2022-02-17 DIAGNOSIS — E03.9 HYPOTHYROIDISM, UNSPECIFIED TYPE: ICD-10-CM

## 2022-02-17 DIAGNOSIS — R13.14 DYSPHAGIA, PHARYNGOESOPHAGEAL: ICD-10-CM

## 2022-02-17 DIAGNOSIS — Z90.02 S/P LARYNGECTOMY: Primary | ICD-10-CM

## 2022-02-17 DIAGNOSIS — R49.1 APHONIA: ICD-10-CM

## 2022-02-17 DIAGNOSIS — Z78.9 ON ENTERAL NUTRITION: ICD-10-CM

## 2022-02-17 LAB
T4 FREE SERPL-MCNC: 1.36 NG/DL (ref 0.71–1.51)
TSH SERPL DL<=0.005 MIU/L-ACNC: 0.05 UIU/ML (ref 0.4–4)

## 2022-02-17 PROCEDURE — 3288F FALL RISK ASSESSMENT DOCD: CPT | Mod: HCNC,CPTII,S$GLB, | Performed by: NURSE PRACTITIONER

## 2022-02-17 PROCEDURE — 3074F SYST BP LT 130 MM HG: CPT | Mod: HCNC,CPTII,S$GLB, | Performed by: NURSE PRACTITIONER

## 2022-02-17 PROCEDURE — 36415 COLL VENOUS BLD VENIPUNCTURE: CPT | Mod: HCNC | Performed by: NURSE PRACTITIONER

## 2022-02-17 PROCEDURE — 3044F PR MOST RECENT HEMOGLOBIN A1C LEVEL <7.0%: ICD-10-PCS | Mod: HCNC,CPTII,S$GLB, | Performed by: NURSE PRACTITIONER

## 2022-02-17 PROCEDURE — 99999 PR PBB SHADOW E&M-EST. PATIENT-LVL I: CPT | Mod: PBBFAC,HCNC,, | Performed by: DIETITIAN, REGISTERED

## 2022-02-17 PROCEDURE — 3078F DIAST BP <80 MM HG: CPT | Mod: HCNC,CPTII,S$GLB, | Performed by: NURSE PRACTITIONER

## 2022-02-17 PROCEDURE — 99024 POSTOP FOLLOW-UP VISIT: CPT | Mod: HCNC,S$GLB,, | Performed by: NURSE PRACTITIONER

## 2022-02-17 PROCEDURE — 3060F PR POS MICROALBUMINURIA RESULT DOCUMENTED/REVIEW: ICD-10-PCS | Mod: HCNC,CPTII,S$GLB, | Performed by: NURSE PRACTITIONER

## 2022-02-17 PROCEDURE — 84443 ASSAY THYROID STIM HORMONE: CPT | Mod: HCNC | Performed by: NURSE PRACTITIONER

## 2022-02-17 PROCEDURE — 1126F PR PAIN SEVERITY QUANTIFIED, NO PAIN PRESENT: ICD-10-PCS | Mod: HCNC,CPTII,S$GLB, | Performed by: NURSE PRACTITIONER

## 2022-02-17 PROCEDURE — 3008F PR BODY MASS INDEX (BMI) DOCUMENTED: ICD-10-PCS | Mod: HCNC,CPTII,S$GLB, | Performed by: NURSE PRACTITIONER

## 2022-02-17 PROCEDURE — 3074F PR MOST RECENT SYSTOLIC BLOOD PRESSURE < 130 MM HG: ICD-10-PCS | Mod: HCNC,CPTII,S$GLB, | Performed by: NURSE PRACTITIONER

## 2022-02-17 PROCEDURE — 3078F PR MOST RECENT DIASTOLIC BLOOD PRESSURE < 80 MM HG: ICD-10-PCS | Mod: HCNC,CPTII,S$GLB, | Performed by: NURSE PRACTITIONER

## 2022-02-17 PROCEDURE — 1126F AMNT PAIN NOTED NONE PRSNT: CPT | Mod: HCNC,CPTII,S$GLB, | Performed by: NURSE PRACTITIONER

## 2022-02-17 PROCEDURE — 1101F PT FALLS ASSESS-DOCD LE1/YR: CPT | Mod: HCNC,CPTII,S$GLB, | Performed by: NURSE PRACTITIONER

## 2022-02-17 PROCEDURE — 97803 PR MED NUTR THER, SUBSQ, INDIV, EA 15 MIN: ICD-10-PCS | Mod: HCNC,S$GLB,, | Performed by: DIETITIAN, REGISTERED

## 2022-02-17 PROCEDURE — 99999 PR PBB SHADOW E&M-EST. PATIENT-LVL III: CPT | Mod: PBBFAC,HCNC,, | Performed by: NURSE PRACTITIONER

## 2022-02-17 PROCEDURE — 3288F PR FALLS RISK ASSESSMENT DOCUMENTED: ICD-10-PCS | Mod: HCNC,CPTII,S$GLB, | Performed by: NURSE PRACTITIONER

## 2022-02-17 PROCEDURE — 84439 ASSAY OF FREE THYROXINE: CPT | Mod: HCNC | Performed by: NURSE PRACTITIONER

## 2022-02-17 PROCEDURE — 3044F HG A1C LEVEL LT 7.0%: CPT | Mod: HCNC,CPTII,S$GLB, | Performed by: NURSE PRACTITIONER

## 2022-02-17 PROCEDURE — 99024 PR POST-OP FOLLOW-UP VISIT: ICD-10-PCS | Mod: HCNC,S$GLB,, | Performed by: NURSE PRACTITIONER

## 2022-02-17 PROCEDURE — 99999 PR PBB SHADOW E&M-EST. PATIENT-LVL I: ICD-10-PCS | Mod: PBBFAC,HCNC,, | Performed by: DIETITIAN, REGISTERED

## 2022-02-17 PROCEDURE — 1159F PR MEDICATION LIST DOCUMENTED IN MEDICAL RECORD: ICD-10-PCS | Mod: HCNC,CPTII,S$GLB, | Performed by: NURSE PRACTITIONER

## 2022-02-17 PROCEDURE — 3066F PR DOCUMENTATION OF TREATMENT FOR NEPHROPATHY: ICD-10-PCS | Mod: HCNC,CPTII,S$GLB, | Performed by: NURSE PRACTITIONER

## 2022-02-17 PROCEDURE — 1159F MED LIST DOCD IN RCRD: CPT | Mod: HCNC,CPTII,S$GLB, | Performed by: NURSE PRACTITIONER

## 2022-02-17 PROCEDURE — 3060F POS MICROALBUMINURIA REV: CPT | Mod: HCNC,CPTII,S$GLB, | Performed by: NURSE PRACTITIONER

## 2022-02-17 PROCEDURE — 97803 MED NUTRITION INDIV SUBSEQ: CPT | Mod: HCNC,S$GLB,, | Performed by: DIETITIAN, REGISTERED

## 2022-02-17 PROCEDURE — 99999 PR PBB SHADOW E&M-EST. PATIENT-LVL III: ICD-10-PCS | Mod: PBBFAC,HCNC,, | Performed by: NURSE PRACTITIONER

## 2022-02-17 PROCEDURE — 3066F NEPHROPATHY DOC TX: CPT | Mod: HCNC,CPTII,S$GLB, | Performed by: NURSE PRACTITIONER

## 2022-02-17 PROCEDURE — 1101F PR PT FALLS ASSESS DOC 0-1 FALLS W/OUT INJ PAST YR: ICD-10-PCS | Mod: HCNC,CPTII,S$GLB, | Performed by: NURSE PRACTITIONER

## 2022-02-17 PROCEDURE — 3008F BODY MASS INDEX DOCD: CPT | Mod: HCNC,CPTII,S$GLB, | Performed by: NURSE PRACTITIONER

## 2022-02-17 PROCEDURE — 92507 TX SP LANG VOICE COMM INDIV: CPT | Mod: GN,HCNC

## 2022-02-17 RX ORDER — IPRATROPIUM BROMIDE 21 UG/1
2 SPRAY, METERED NASAL 2 TIMES DAILY
Qty: 30 ML | Refills: 11 | Status: ON HOLD | OUTPATIENT
Start: 2022-02-17 | End: 2022-05-16 | Stop reason: HOSPADM

## 2022-02-17 NOTE — ASSESSMENT & PLAN NOTE
Healing well. We discussed advancing his diet to soft foods. Will remove PEG tube when eating exclusively by mouth and maintaining weight. Questions answered. RTC in 2 months, sooner if needed.

## 2022-02-17 NOTE — PROGRESS NOTES
Chief Complaint   Patient presents with    Follow-up     Oncology History   Larynx neoplasm malignant   8/4/2020 Initial Diagnosis    Larynx neoplasm malignant     8/6/2020 Tumor Conference    His case was discussed at the Multidisciplinary Head and Neck Team Planning Meeting.    Representatives from Medical Oncology, Radiation Oncology, Head and Neck Surgical Oncology, Psychosocial Oncology, and Speech and Language Pathology discussed the case with the following recommendations:    1) definitive radiation              8/6/2020 Cancer Staged    Staging form: Larynx - Glottis, AJCC 8th Edition  - Clinical stage from 8/6/2020: Stage II (cT2, cN0, cM0)     8/6/2020 Cancer Staged    Cancer Staging  Larynx neoplasm malignant  Staging form: Larynx - Glottis, AJCC 8th Edition  - Clinical stage from 8/6/2020: Stage II (cT2, cN0, cM0) - Signed by Fariha Muñoz NP on 8/6/2020 12/16/2021 Tumor Conference    His case was discussed at the Multidisciplinary Head and Neck Team Planning Meeting.    Representatives from Medical Oncology, Radiation Oncology, Head and Neck Surgical Oncology, Psychosocial Oncology, and Speech and Language Pathology discussed the case with the following recommendations:    1) TL  2) oncology referral  3) psychology referral            12/27/2021 Surgery    1. DL And tracheostomy  2. PEG     1/6/2022 Surgery    1. Diagnostic direct laryngoscopy  2. Bilateral modified neck dissection of levels 2 through 4  3. Total laryngectomy  4. Total thyroidectomy with bilateral paratracheal/upper mediastinal neck dissection  5. Autotransplantation of left inferior parathyroid into left sternocleidomastoid muscle   6. Left anterolateral thigh free flap for closure of total laryngectomy neck skin defect  7. Advancement flap for closure of left thigh donor site advanced area was 25 x 10 cm     1/20/2022 Cancer Staged    Staging form: Larynx - Glottis, AJCC 8th Edition  - Pathologic stage from 1/20/2022:  Stage LOU (pT4a, pN0, cM0)           HPI   71 y.o. male  returns for a post op visit. He has been doing well since his last visit. He is tolerating a liquid diet. No complaints.    Review of Systems   Constitutional: Negative for fatigue and unexpected weight change.   HENT: Per HPI.  Eyes: Negative for visual disturbance.   Respiratory: Negative for shortness of breath, hemoptysis   Cardiovascular: Negative for chest pain and palpitations.   Musculoskeletal: Negative for decreased ROM, back pain.   Skin: Negative for rash, sunburn, itching.   Neurological: Negative for dizziness and seizures.   Hematological: Negative for adenopathy. Does not bruise/bleed easily.   Endocrine: Negative for rapid weight loss/weight gain, heat/cold intolerance.     Past Medical History   Patient Active Problem List   Diagnosis    Melanocytic nevus    Body mass index (BMI) of 29.0-29.9 in adult    Benign hypertension    Overweight    Paroxysmal atrial fibrillation    Type 2 diabetes mellitus with diabetic arthropathy, with long-term current use of insulin    Fatty liver disease, nonalcoholic    False positive serological test for hepatitis C    Hyperlipidemia    Type 2 diabetes mellitus with hyperglycemia, without long-term current use of insulin    Gastroesophageal reflux disease without esophagitis    Type 2 diabetes mellitus with hyperglycemia    Vitamin B12 deficiency    Hyperuricemia    Hypovitaminosis D    Proteinuria    On enteral nutrition    Larynx neoplasm malignant    Dehydration    NSVT (nonsustained ventricular tachycardia)    Adverse effect of adrenal cortical steroids, sequela    S/P laryngectomy    Hypocalcemia    Aphonia    Dysphagia, pharyngoesophageal           Past Surgical History   Past Surgical History:   Procedure Laterality Date    DIRECT LARYNGOBRONCHOSCOPY N/A 12/27/2021    Procedure: LARYNGOSCOPY, DIRECT, WITH BRONCHOSCOPY;  Surgeon: Flex Espinosa MD;  Location: [facility] OR [address] [place] [address];   Service: ENT;  Laterality: N/A;    DISSECTION OF NECK Bilateral 1/6/2022    Procedure: DISSECTION, NECK;  Surgeon: Jesse James MD;  Location: [facility] OR [facility] FLR;  Service: ENT;  Laterality: Bilateral;    FLAP PROCEDURE Right 1/6/2022    Procedure: CREATION, FREE FLAP;  Surgeon: Alise Hart MD;  [facility];  Service: ENT;  Laterality: Right;  Ischemic start 1351  Ischemic stop 1502    LARYNGECTOMY N/A 1/6/2022    Procedure: LARYNGECTOMY;  Surgeon: Jesse James MD;  [facility];  Service: ENT;  Laterality: N/A;    LARYNGOSCOPY N/A 8/4/2020    Procedure: Suspension microlaryngoscopy with biopsy, possible KTP laser treatment/excision;  Surgeon: Stew Noel MD;  [facility];  Service: ENT;  Laterality: N/A;  Microscope, telescopes, tower, microinstruments, KTP laser, rep conf# [id number] IC 7/28.    LARYNGOSCOPY N/A 3/16/2021    Procedure: Suspension microlaryngoscopy with excision of lesion, possible CO2 laser;  Surgeon: Stew Noel MD;  Location: [facility] OR [address] [place] [address];  Service: ENT;  Laterality: N/A;  Microscope, telescopes, tower, microinstruments, CO2 laser, rep conf# [id number] IC 3/4.    LARYNGOSCOPY N/A 4/1/2021    Procedure: Suspension microlaryngoscopy with KTP laser excision of lesion;  Surgeon: Stew Noel MD;  [facility];  Service: ENT;  Laterality: N/A;  Microscope, telescopes, tower, microinstruments, 70 degree scope, vocal fold , KTP laser, rep conf# [id number] BC    LARYNGOSCOPY N/A 12/9/2021    Procedure: Suspension microlaryngoscopy with biopsy;  Surgeon: Stew Noel MD;  [facility];  Service: ENT;  Laterality: N/A;  Microscope, telescopes, tower, microinstruments    LARYNGOSCOPY N/A 1/6/2022    Procedure: LARYNGOSCOPY;  Surgeon: Jesse James MD;  Location: [facility] OR [address] [place] [address];  Service: ENT;  Laterality: N/A;    MICROLARYNGOSCOPY N/A 3/17/2020    Procedure:  MICROLARYNGOSCOPY;  Surgeon: Jung Xiao MD;  Location: [facility] OR;  Service: ENT;  Laterality: N/A;  Laser Microlaryngoscopy  [phone]    REIMPLANTATION OF PARATHYROID TISSUE N/A 1/6/2022    Procedure: REIMPLANTATION, PARATHYROID TISSUE;  Surgeon: Jesse James MD;  Location: [facility] OR 2ND FLR;  Service: ENT;  Laterality: N/A;    THYROIDECTOMY  1/6/2022    Procedure: THYROIDECTOMY;  Surgeon: Jesse James MD;  Location: [facility] OR 2ND FLR;  Service: ENT;;    TRACHEOSTOMY N/A 12/27/2021    Procedure: CREATION, TRACHEOSTOMY;  Surgeon: Flex Espinosa MD;  Location: [facility] OR [facility] FLR;  Service: ENT;  Laterality: N/A;         Family History   Family History   Problem Relation Age of Onset    Abnormal EKG Mother     Diabetes Father     Heart disease Father     Hypertension Father            Social History   .  Social History     Socioeconomic History    Marital status:      Spouse name: Janel Batres    Number of children: 2   Occupational History    Occupation: [employer]     Employer: [email or web address]   Tobacco Use    Smoking status: Never Smoker    Smokeless tobacco: Never Used   Substance and Sexual Activity    Alcohol use: Not Currently     Comment: occasional    Drug use: No    Sexual activity: Yes     Partners: Female   Social History Narrative    2 children from his 1st wife         Allergies   Review of patient's allergies indicates:   Allergen Reactions    Pollen extracts     Lovastatin Rash     Not confirmed but pt skeptical           Physical Exam     Vitals:    02/17/22 1305   BP: 110/67   Pulse: 63   Temp: 98.3 °F (36.8 °C)         Body mass index is 22.17 kg/m².      General: AOx3, NAD   Respiratory:  Symmetric chest rise, normal effort  Oral Cavity:  Oral Tongue mobile, no lesions noted. Hard Palate WNL. No buccal or FOM lesions.  Oropharynx:  No masses/lesions of the posterior pharyngeal wall. Tonsillar fossa without lesions. Soft  palate without masses. Midline uvula.   Neck: Stoma widely patent. Skin paddle healthy with good color and turgor. Incision. CDI.  No redness, drainage, or fluid collection noted.  Edges well approximated.   Face: House Brackmann I bilaterally.   RLE: Incision CDI.  No redness, drainage, or fluid collection noted.  Edges well approximated.     Assessment/Plan  Problem List Items Addressed This Visit        ENT    S/P laryngectomy - Primary    Relevant Orders    TSH    T4, Free       Oncology    Larynx neoplasm malignant     Healing well. We discussed advancing his diet to soft foods. Will remove PEG tube when eating exclusively by mouth and maintaining weight. Questions answered. RTC in 2 months, sooner if needed.           Other Visit Diagnoses     Hypothyroidism, unspecified type        Relevant Orders    TSH    T4, Free

## 2022-02-18 ENCOUNTER — PATIENT MESSAGE (OUTPATIENT)
Dept: OTOLARYNGOLOGY | Facility: CLINIC | Age: 72
End: 2022-02-18
Payer: MEDICARE

## 2022-02-18 DIAGNOSIS — E03.9 HYPOTHYROIDISM, UNSPECIFIED TYPE: Primary | ICD-10-CM

## 2022-02-18 RX ORDER — LEVOTHYROXINE SODIUM 100 UG/1
100 TABLET ORAL
Qty: 30 TABLET | Refills: 11 | Status: SHIPPED | OUTPATIENT
Start: 2022-02-18 | End: 2022-12-16 | Stop reason: ALTCHOICE

## 2022-02-21 ENCOUNTER — OFFICE VISIT (OUTPATIENT)
Dept: ENDOCRINOLOGY | Facility: CLINIC | Age: 72
End: 2022-02-21
Payer: MEDICARE

## 2022-02-21 ENCOUNTER — TELEPHONE (OUTPATIENT)
Dept: FAMILY MEDICINE | Facility: CLINIC | Age: 72
End: 2022-02-21
Payer: MEDICARE

## 2022-02-21 VITALS
BODY MASS INDEX: 22.73 KG/M2 | DIASTOLIC BLOOD PRESSURE: 60 MMHG | OXYGEN SATURATION: 98 % | TEMPERATURE: 98 F | HEIGHT: 66 IN | WEIGHT: 141.44 LBS | HEART RATE: 87 BPM | SYSTOLIC BLOOD PRESSURE: 110 MMHG

## 2022-02-21 DIAGNOSIS — Z78.9 STATIN INTOLERANCE: ICD-10-CM

## 2022-02-21 DIAGNOSIS — E78.5 HYPERLIPIDEMIA, UNSPECIFIED HYPERLIPIDEMIA TYPE: ICD-10-CM

## 2022-02-21 DIAGNOSIS — E11.65 TYPE 2 DIABETES MELLITUS WITH HYPERGLYCEMIA, WITH LONG-TERM CURRENT USE OF INSULIN: Primary | ICD-10-CM

## 2022-02-21 DIAGNOSIS — I10 BENIGN HYPERTENSION: ICD-10-CM

## 2022-02-21 DIAGNOSIS — N52.9 ERECTILE DYSFUNCTION, UNSPECIFIED ERECTILE DYSFUNCTION TYPE: ICD-10-CM

## 2022-02-21 DIAGNOSIS — E55.9 HYPOVITAMINOSIS D: ICD-10-CM

## 2022-02-21 DIAGNOSIS — Z79.4 TYPE 2 DIABETES MELLITUS WITH HYPERGLYCEMIA, WITH LONG-TERM CURRENT USE OF INSULIN: Primary | ICD-10-CM

## 2022-02-21 DIAGNOSIS — E03.9 HYPOTHYROIDISM, UNSPECIFIED TYPE: ICD-10-CM

## 2022-02-21 DIAGNOSIS — B35.1 ONYCHOMYCOSIS: ICD-10-CM

## 2022-02-21 PROCEDURE — 3288F FALL RISK ASSESSMENT DOCD: CPT | Mod: HCNC,CPTII,S$GLB, | Performed by: PHYSICIAN ASSISTANT

## 2022-02-21 PROCEDURE — 99499 RISK ADDL DX/OHS AUDIT: ICD-10-PCS | Mod: S$GLB,,, | Performed by: PHYSICIAN ASSISTANT

## 2022-02-21 PROCEDURE — 99213 PR OFFICE/OUTPT VISIT, EST, LEVL III, 20-29 MIN: ICD-10-PCS | Mod: HCNC,S$GLB,, | Performed by: PHYSICIAN ASSISTANT

## 2022-02-21 PROCEDURE — 3288F PR FALLS RISK ASSESSMENT DOCUMENTED: ICD-10-PCS | Mod: HCNC,CPTII,S$GLB, | Performed by: PHYSICIAN ASSISTANT

## 2022-02-21 PROCEDURE — 3060F POS MICROALBUMINURIA REV: CPT | Mod: HCNC,CPTII,S$GLB, | Performed by: PHYSICIAN ASSISTANT

## 2022-02-21 PROCEDURE — 1160F PR REVIEW ALL MEDS BY PRESCRIBER/CLIN PHARMACIST DOCUMENTED: ICD-10-PCS | Mod: HCNC,CPTII,S$GLB, | Performed by: PHYSICIAN ASSISTANT

## 2022-02-21 PROCEDURE — 3066F PR DOCUMENTATION OF TREATMENT FOR NEPHROPATHY: ICD-10-PCS | Mod: HCNC,CPTII,S$GLB, | Performed by: PHYSICIAN ASSISTANT

## 2022-02-21 PROCEDURE — 1101F PR PT FALLS ASSESS DOC 0-1 FALLS W/OUT INJ PAST YR: ICD-10-PCS | Mod: HCNC,CPTII,S$GLB, | Performed by: PHYSICIAN ASSISTANT

## 2022-02-21 PROCEDURE — 1159F PR MEDICATION LIST DOCUMENTED IN MEDICAL RECORD: ICD-10-PCS | Mod: HCNC,CPTII,S$GLB, | Performed by: PHYSICIAN ASSISTANT

## 2022-02-21 PROCEDURE — 3066F NEPHROPATHY DOC TX: CPT | Mod: HCNC,CPTII,S$GLB, | Performed by: PHYSICIAN ASSISTANT

## 2022-02-21 PROCEDURE — 1126F AMNT PAIN NOTED NONE PRSNT: CPT | Mod: HCNC,CPTII,S$GLB, | Performed by: PHYSICIAN ASSISTANT

## 2022-02-21 PROCEDURE — 3060F PR POS MICROALBUMINURIA RESULT DOCUMENTED/REVIEW: ICD-10-PCS | Mod: HCNC,CPTII,S$GLB, | Performed by: PHYSICIAN ASSISTANT

## 2022-02-21 PROCEDURE — 3044F PR MOST RECENT HEMOGLOBIN A1C LEVEL <7.0%: ICD-10-PCS | Mod: HCNC,CPTII,S$GLB, | Performed by: PHYSICIAN ASSISTANT

## 2022-02-21 PROCEDURE — 1101F PT FALLS ASSESS-DOCD LE1/YR: CPT | Mod: HCNC,CPTII,S$GLB, | Performed by: PHYSICIAN ASSISTANT

## 2022-02-21 PROCEDURE — 3074F SYST BP LT 130 MM HG: CPT | Mod: HCNC,CPTII,S$GLB, | Performed by: PHYSICIAN ASSISTANT

## 2022-02-21 PROCEDURE — 99999 PR PBB SHADOW E&M-EST. PATIENT-LVL V: ICD-10-PCS | Mod: PBBFAC,HCNC,, | Performed by: PHYSICIAN ASSISTANT

## 2022-02-21 PROCEDURE — 3008F PR BODY MASS INDEX (BMI) DOCUMENTED: ICD-10-PCS | Mod: HCNC,CPTII,S$GLB, | Performed by: PHYSICIAN ASSISTANT

## 2022-02-21 PROCEDURE — 3008F BODY MASS INDEX DOCD: CPT | Mod: HCNC,CPTII,S$GLB, | Performed by: PHYSICIAN ASSISTANT

## 2022-02-21 PROCEDURE — 1159F MED LIST DOCD IN RCRD: CPT | Mod: HCNC,CPTII,S$GLB, | Performed by: PHYSICIAN ASSISTANT

## 2022-02-21 PROCEDURE — 1160F RVW MEDS BY RX/DR IN RCRD: CPT | Mod: HCNC,CPTII,S$GLB, | Performed by: PHYSICIAN ASSISTANT

## 2022-02-21 PROCEDURE — 1126F PR PAIN SEVERITY QUANTIFIED, NO PAIN PRESENT: ICD-10-PCS | Mod: HCNC,CPTII,S$GLB, | Performed by: PHYSICIAN ASSISTANT

## 2022-02-21 PROCEDURE — 99213 OFFICE O/P EST LOW 20 MIN: CPT | Mod: HCNC,S$GLB,, | Performed by: PHYSICIAN ASSISTANT

## 2022-02-21 PROCEDURE — 3074F PR MOST RECENT SYSTOLIC BLOOD PRESSURE < 130 MM HG: ICD-10-PCS | Mod: HCNC,CPTII,S$GLB, | Performed by: PHYSICIAN ASSISTANT

## 2022-02-21 PROCEDURE — 3044F HG A1C LEVEL LT 7.0%: CPT | Mod: HCNC,CPTII,S$GLB, | Performed by: PHYSICIAN ASSISTANT

## 2022-02-21 PROCEDURE — 3078F PR MOST RECENT DIASTOLIC BLOOD PRESSURE < 80 MM HG: ICD-10-PCS | Mod: HCNC,CPTII,S$GLB, | Performed by: PHYSICIAN ASSISTANT

## 2022-02-21 PROCEDURE — 3078F DIAST BP <80 MM HG: CPT | Mod: HCNC,CPTII,S$GLB, | Performed by: PHYSICIAN ASSISTANT

## 2022-02-21 PROCEDURE — 99999 PR PBB SHADOW E&M-EST. PATIENT-LVL V: CPT | Mod: PBBFAC,HCNC,, | Performed by: PHYSICIAN ASSISTANT

## 2022-02-21 PROCEDURE — 99499 UNLISTED E&M SERVICE: CPT | Mod: S$GLB,,, | Performed by: PHYSICIAN ASSISTANT

## 2022-02-21 NOTE — PROGRESS NOTES
"CC: This 71 y.o. male presents for management of Diabetes Mellitus  and chronic conditions pending review including HTN, HLP.    HPI: was diagnosed with T2DM in . States his his appetite has decreased. 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 He finished radiation in 2020. Declines . Pt has had a thyroidectomy and laryngectomy for a laryngeal carcinoma. He cannot talk, uses a pad to write.   Has never been hospitalized r/t DM.  Family hx of DM: father  Fhx of thyroid disease: none  Denies missing doses of DM medication.   hypoglycemia at home: none  monitoring BG at home:  Fastin-110    Diet:   5 Glucerna daily.  No snacks. Avoids sugary beverages.     Exercise: Runs 2 miles once weekly.    CURRENT DM MEDS: none  Standards of Care:  Eye exam: Dr. Pham  Podiatry exam: n/a  DE: never.     Taking ergo 2x weekly.    Hypothyroidism  S/p thyroidectomy for esophageal cancer  Recently changed dose to 100 mcg daily.  No fatigue, palpitations, diarrhea, tremors or constipation.    PMHx, PSHx: reviewed in epic.  Social Hx: no E/T use.    ROS:   Gen: Appetite good, + weight gain (5 lbs), denies fatigue and weakness.  Skin: Skin is intact and heals well , no rashes, no hair changes  Eyes: Denies visual disturbances  Resp: no SOB or GRIFFIN, no cough  Cardiac: No palpitations, chest pain, no edema   GI: No nausea or vomiting, diarrhea, constipation, or abdominal pain.  /GYN: + difficulty with erections, No nocturia, burning or pain.   MS/Neuro: Denies numbness/ tingling in BLE; Gait steady, speech clear  Psych: Denies drug/ETOH abuse, no hx of depression.  Other systems: negative.    /60 (BP Location: Left arm, Patient Position: Sitting, BP Method: Small (Manual))   Pulse 87   Temp 98.3 °F (36.8 °C) (Oral)   Ht 5' 6" (1.676 m)   Wt 64.1 kg (141 lb 6.8 oz)   SpO2 98%   BMI 22.83 kg/m²      PE:  GENERAL:  middle aged talkative male, hoarse, well developed, " well nourished.  PSYCH: AAOx3, appropriate mood and affect, pleasant expression, conversant, appears relaxed, well groomed.   EYES: Conjunctiva, corneas clear  NECK: Supple, trachea midline,no thyromegaly or nodules  CHEST: Resp even and unlabored, CTA bilateral.  CARDIAC: RRR, S1, S2 heard, no murmurs  ABDOMEN: Soft, non-tender, non-distended   VASCULAR: DP pulses +2/4 bilaterally, no edema.  NEURO: Gait steady, CN ll-Xll grossly intact  SKIN: Skin warm and dry no acanthosis nigracans.  2/22  Foot Exam: no sores or macerations noted. Onychomycosis on nails bl.    Protective Sensation (w/ 10 gram monofilament):  Right: Intact  Left: Intact    Visual Inspection:  Normal -  Bilateral and Nails Intact - without Evidence of Foot Deformity- Bilateral    Pedal Pulses:   Right: Present  Left: Present    Posterior tibialis:   Right:Present  Left: Present     Vibratory Sensation  Right:Decreased  Left:Decreased    Personally reviewed labs below:    Lab Visit on 02/17/2022   Component Date Value Ref Range Status    TSH 02/17/2022 0.052 (A) 0.400 - 4.000 uIU/mL Final    Free T4 02/17/2022 1.36  0.71 - 1.51 ng/dL Final   Lab Visit on 02/14/2022   Component Date Value Ref Range Status    Microalbumin, Urine 02/14/2022 76.0  ug/mL Final    Creatinine, Urine 02/14/2022 130.0  23.0 - 375.0 mg/dL Final    Microalb/Creat Ratio 02/14/2022 58.5 (A) 0.0 - 30.0 ug/mg Final   Lab Visit on 02/14/2022   Component Date Value Ref Range Status    Cholesterol 02/14/2022 144  120 - 199 mg/dL Final    Comment: The National Cholesterol Education Program (NCEP) has set the  following guidelines (reference ranges) for Cholesterol:  Optimal.....................<200 mg/dL  Borderline High.............200-239 mg/dL  High........................> or = 240 mg/dL      Triglycerides 02/14/2022 103  30 - 150 mg/dL Final    Comment: The National Cholesterol Education Program (NCEP) has set the  following guidelines (reference values) for  triglycerides:  Normal......................<150 mg/dL  Borderline High.............150-199 mg/dL  High........................200-499 mg/dL      HDL 02/14/2022 48  40 - 75 mg/dL Final    Comment: The National Cholesterol Education Program (NCEP) has set the  following guidelines (reference values) for HDL Cholesterol:  Low...............<40 mg/dL  Optimal...........>60 mg/dL      LDL Cholesterol 02/14/2022 75.4  63.0 - 159.0 mg/dL Final    Comment: The National Cholesterol Education Program (NCEP) has set the  following guidelines (reference values) for LDL Cholesterol:  Optimal.......................<130 mg/dL  Borderline High...............130-159 mg/dL  High..........................160-189 mg/dL  Very High.....................>190 mg/dL      HDL/Cholesterol Ratio 02/14/2022 33.3  20.0 - 50.0 % Final    Total Cholesterol/HDL Ratio 02/14/2022 3.0  2.0 - 5.0 Final    Non-HDL Cholesterol 02/14/2022 96  mg/dL Final    Comment: Risk category and Non-HDL cholesterol goals:  Coronary heart disease (CHD)or equivalent (10-year risk of CHD >20%):  Non-HDL cholesterol goal     <130 mg/dL  Two or more CHD risk factors and 10-year risk of CHD <= 20%:  Non-HDL cholesterol goal     <160 mg/dL  0 to 1 CHD risk factor:  Non-HDL cholesterol goal     <190 mg/dL      Sodium 02/14/2022 140  136 - 145 mmol/L Final    Potassium 02/14/2022 4.6  3.5 - 5.1 mmol/L Final    Chloride 02/14/2022 104  95 - 110 mmol/L Final    CO2 02/14/2022 28  23 - 29 mmol/L Final    Glucose 02/14/2022 129 (A) 70 - 110 mg/dL Final    BUN 02/14/2022 18  8 - 23 mg/dL Final    Creatinine 02/14/2022 0.8  0.5 - 1.4 mg/dL Final    Calcium 02/14/2022 9.6  8.7 - 10.5 mg/dL Final    Total Protein 02/14/2022 7.0  6.0 - 8.4 g/dL Final    Albumin 02/14/2022 3.7  3.5 - 5.2 g/dL Final    Total Bilirubin 02/14/2022 1.1 (A) 0.1 - 1.0 mg/dL Final    Comment: For infants and newborns, interpretation of results should be based  on gestational age, weight and in  agreement with clinical  observations.    Premature Infant recommended reference ranges:  Up to 24 hours.............<8.0 mg/dL  Up to 48 hours............<12.0 mg/dL  3-5 days..................<15.0 mg/dL  6-29 days.................<15.0 mg/dL      Alkaline Phosphatase 02/14/2022 95  55 - 135 U/L Final    AST 02/14/2022 21  10 - 40 U/L Final    ALT 02/14/2022 21  10 - 44 U/L Final    Anion Gap 02/14/2022 8  8 - 16 mmol/L Final    eGFR if African American 02/14/2022 >60.0  >60 mL/min/1.73 m^2 Final    eGFR if non African American 02/14/2022 >60.0  >60 mL/min/1.73 m^2 Final    Comment: Calculation used to obtain the estimated glomerular filtration  rate (eGFR) is the CKD-EPI equation.       Hemoglobin A1C 02/14/2022 5.4  4.0 - 5.6 % Final    Comment: ADA Screening Guidelines:  5.7-6.4%  Consistent with prediabetes  >or=6.5%  Consistent with diabetes    High levels of fetal hemoglobin interfere with the HbA1C  assay. Heterozygous hemoglobin variants (HbS, HgC, etc)do  not significantly interfere with this assay.   However, presence of multiple variants may affect accuracy.      Estimated Avg Glucose 02/14/2022 108  68 - 131 mg/dL Final    Vit D, 25-Hydroxy 02/14/2022 37  30 - 96 ng/mL Final    Comment: Vitamin D deficiency.........<10 ng/mL                              Vitamin D insufficiency......10-29 ng/mL       Vitamin D sufficiency........> or equal to 30 ng/mL  Vitamin D toxicity............>100 ng/mL     Lab Visit on 01/28/2022   Component Date Value Ref Range Status    PTH, Intact 01/28/2022 16.7  9.0 - 77.0 pg/mL Final    Ionized Calcium 01/28/2022 1.23  1.06 - 1.42 mmol/L Final    Calcium 01/28/2022 10.0  8.7 - 10.5 mg/dL Final       ASSESSMENT and PLAN:    1. Type 2 diabetes mellitus with hyperglycemia, with long-term current use of insulin  Ambulatory referral/consult to Ophthalmology    Hemoglobin A1C   2. Benign hypertension     3. Hyperlipidemia, unspecified hyperlipidemia type     4.  Hypovitaminosis D     5. Onychomycosis     6. Statin intolerance     7. Erectile dysfunction, unspecified erectile dysfunction type     8. Hypothyroidism, unspecified type        T2DM with hyperglycemia- A1c is in the normal range. Diet controlled. Monitor. F/u w/ PCP. Discussed DM, progression of disease, long term complications, tx options.     - takes ASA, ARB, statin    HTN - controlled, continue ASA- monitor.   HLP - LDL , on statin therapy, LFTs WNL  Hypovitaminosis [id number] ergocalciferol 2x weekly.  Statin intolerance-monitor cholesterol  ED- testosterone is wnl-monitor  Hypothyroidism-supressed-dose recently changed by his oncologist. Seeing ENT.    F/u prn

## 2022-02-26 ENCOUNTER — PATIENT MESSAGE (OUTPATIENT)
Dept: OTOLARYNGOLOGY | Facility: CLINIC | Age: 72
End: 2022-02-26
Payer: MEDICARE

## 2022-02-26 DIAGNOSIS — C32.9 LARYNX NEOPLASM MALIGNANT: Primary | ICD-10-CM

## 2022-03-01 NOTE — PROGRESS NOTES
Subjective:       Patient ID: Cyrus Batres Jr. is a 71 y.o. male.    Chief Complaint: Shoulder Pain    Patient is a 71-year-old male who comes for follow-up.  I had seen him perhaps more than a couple of years back after which he was lost to follow-up with another primary care physician.  He is back today and I am not sure about the reasons.  Previous PCP has left probably.    The quality of exchange of history is extremely limited at this point because of patient's tracheostomy and breathing through the tracheostomy hole.  His voice is mostly whispers and I could not understand majority of it.  He did some of explanation on a scratch board.    Patient complains of shoulder problems.  Difficult to differentiate if it is pain or restriction of movement.  Difficult communication.  Mostly uses hand board for writing and sometimes gesturing which is difficult to interpret.    He seems to have some difficulty raising of the shoulders especially after the laryngeal surgery.  He has lost weight also.    Specifically no pain.  On the left side.  Some pain on the right side.    He is also on anticoagulation at this point.  He is also on diltiazem, levothyroxine, losartan and vitamin-D 3.     Feeding is mostly by PEG tube.    Shoulder Pain   The pain is present in the left shoulder and right shoulder. This is a new problem. The current episode started more than 1 month ago. There has been no history of extremity trauma (Patient had throat cancer surgery). The problem occurs constantly. The quality of the pain is described as dull. Associated symptoms include a limited range of motion. Pertinent negatives include no fever or headaches.       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     Social History     Socioeconomic History    Marital status:       Spouse name: Janel Batres    Number of children: 2   Occupational History    Occupation: [employer]     Employer: [employer]   Tobacco Use    Smoking status: Never Smoker    Smokeless tobacco: Never Used   Substance and Sexual Activity    Alcohol use: Not Currently     Comment: occasional    Drug use: No    Sexual activity: Yes     Partners: Female   Social History Narrative    2 children from his 1st wife     Past Surgical History:   Procedure Laterality Date    DIRECT LARYNGOBRONCHOSCOPY N/A 12/27/2021    Procedure: LARYNGOSCOPY, DIRECT, WITH BRONCHOSCOPY;  Surgeon: Flex Espinosa MD;  [facility];  Service: ENT;  Laterality: N/A;    DISSECTION OF NECK Bilateral 1/6/2022    Procedure: DISSECTION, NECK;  Surgeon: Jesse James MD;  [facility];  Service: ENT;  Laterality: Bilateral;    FLAP PROCEDURE Right 1/6/2022    Procedure: CREATION, FREE FLAP;  Surgeon: Alise Hart MD;  [facility];  Service: ENT;  Laterality: Right;  Ischemic start 1351  Ischemic stop 1502    LARYNGECTOMY N/A 1/6/2022    Procedure: LARYNGECTOMY;  Surgeon: Jesse James MD;  [facility];  Service: ENT;  Laterality: N/A;    LARYNGOSCOPY N/A 8/4/2020    Procedure: Suspension microlaryngoscopy with biopsy, possible KTP laser treatment/excision;  Surgeon: Stew Noel MD;  [facility];  Service: ENT;  Laterality: N/A;  Microscope, telescopes, tower, microinstruments, KTP laser, rep conf# [id number] IC 7/28.    LARYNGOSCOPY N/A 3/16/2021    Procedure: Suspension microlaryngoscopy with excision of lesion, possible CO2 laser;  Surgeon: Stew Noel MD;  [facility];  Service: ENT;  Laterality: N/A;  Microscope, telescopes, tower, microinstruments, CO2 laser, rep conf# [id number] IC 3/4.    LARYNGOSCOPY N/A 4/1/2021    Procedure: Suspension microlaryngoscopy with KTP laser excision of lesion;  Surgeon: Stew Noel MD;   [facility];  Service: ENT;  Laterality: N/A;  Microscope, telescopes, tower, microinstruments, 70 degree scope, vocal fold , KTP laser, rep conf# [id number] BC    LARYNGOSCOPY N/A 12/9/2021    Procedure: Suspension microlaryngoscopy with biopsy;  Surgeon: Stew Noel MD;  [facility];  Service: ENT;  Laterality: N/A;  Microscope, telescopes, tower, microinstruments    LARYNGOSCOPY N/A 1/6/2022    Procedure: LARYNGOSCOPY;  Surgeon: Jesse James MD;  [facility];  Service: ENT;  Laterality: N/A;    MICROLARYNGOSCOPY N/A 3/17/2020    Procedure: MICROLARYNGOSCOPY;  Surgeon: Jung Xiao MD;  Location: [facility] OR;  Service: ENT;  Laterality: N/A;  Laser Microlaryngoscopy  NEED TO SCHEDULE LASER from myVBO [id number]    REIMPLANTATION OF PARATHYROID TISSUE N/A 1/6/2022    Procedure: REIMPLANTATION, PARATHYROID TISSUE;  Surgeon: Jesse James MD;  [facility];  Service: ENT;  Laterality: N/A;    THYROIDECTOMY  1/6/2022    Procedure: THYROIDECTOMY;  Surgeon: Jesse James MD;  Location: [address];  Service: ENT;;    TRACHEOSTOMY N/A 12/27/2021    Procedure: CREATION, TRACHEOSTOMY;  Surgeon: Flex Espinosa MD;  Location: [address];  Service: ENT;  Laterality: N/A;     Family History   Problem Relation Age of Onset    Abnormal EKG Mother     Diabetes Father     Heart disease Father     Hypertension Father        Review of Systems   Constitutional: Negative for activity change, fever and unexpected weight change.   HENT: Positive for rhinorrhea and trouble swallowing. Negative for hearing loss.         Laryngeal cancer status post surgery.  Currently has a tracheotomy.   Eyes: Negative for discharge and visual disturbance.   Respiratory: Negative for chest tightness and wheezing.    Cardiovascular: Negative for chest pain and palpitations.   Gastrointestinal: Negative for abdominal distention, blood in  "stool, constipation, diarrhea and vomiting.   Endocrine: Negative for polydipsia and polyuria.   Genitourinary: Negative for difficulty urinating, hematuria and urgency.   Musculoskeletal: Negative for arthralgias, joint swelling and neck pain.   Neurological: Negative for weakness and headaches.   Psychiatric/Behavioral: Negative for confusion and dysphoric mood.         Objective:      Blood pressure 133/79, pulse 89, height 5' 6" (1.676 m), weight 64.9 kg (143 lb). Body mass index is 23.08 kg/m².  Physical Exam  Constitutional:       General: He is not in acute distress.     Appearance: He is ill-appearing. He is not toxic-appearing or diaphoretic.   Eyes:      General: No scleral icterus.  Cardiovascular:      Rate and Rhythm: Normal rate and regular rhythm.      Heart sounds: Normal heart sounds.   Pulmonary:      Effort: Pulmonary effort is normal.      Breath sounds: No stridor.   Abdominal:          Comments: Peg tube noted.   Musculoskeletal:      Right shoulder: Decreased range of motion.      Left shoulder: Decreased range of motion.        Arms:       Comments: He has difficulty raising both the shoulders beyond approximately 90°.  Hand  are good with good strength in the fingers.  Probably has lost some muscular strength in the upper shoulders.   Neurological:      Mental Status: He is alert.           Assessment:       1. Adhesive capsulitis of both shoulders    2. Flu vaccine need           Lab Visit on 02/17/2022   Component Date Value Ref Range Status    TSH 02/17/2022 0.052 (A) 0.400 - 4.000 uIU/mL Final    Free T4 02/17/2022 1.36  0.71 - 1.51 ng/dL Final   Lab Visit on 02/14/2022   Component Date Value Ref Range Status    Microalbumin, Urine 02/14/2022 76.0  ug/mL Final    Creatinine, Urine 02/14/2022 130.0  23.0 - 375.0 mg/dL Final    Microalb/Creat Ratio 02/14/2022 58.5 (A) 0.0 - 30.0 ug/mg Final   Lab Visit on 02/14/2022   Component Date Value Ref Range Status    Cholesterol " 02/14/2022 144  120 - 199 mg/dL Final    Triglycerides 02/14/2022 103  30 - 150 mg/dL Final    HDL 02/14/2022 48  40 - 75 mg/dL Final    LDL Cholesterol 02/14/2022 75.4  63.0 - 159.0 mg/dL Final    HDL/Cholesterol Ratio 02/14/2022 33.3  20.0 - 50.0 % Final    Total Cholesterol/HDL Ratio 02/14/2022 3.0  2.0 - 5.0 Final    Non-HDL Cholesterol 02/14/2022 96  mg/dL Final    Sodium 02/14/2022 140  136 - 145 mmol/L Final    Potassium 02/14/2022 4.6  3.5 - 5.1 mmol/L Final    Chloride 02/14/2022 104  95 - 110 mmol/L Final    CO2 02/14/2022 28  23 - 29 mmol/L Final    Glucose 02/14/2022 129 (A) 70 - 110 mg/dL Final    BUN 02/14/2022 18  8 - 23 mg/dL Final    Creatinine 02/14/2022 0.8  0.5 - 1.4 mg/dL Final    Calcium 02/14/2022 9.6  8.7 - 10.5 mg/dL Final    Total Protein 02/14/2022 7.0  6.0 - 8.4 g/dL Final    Albumin 02/14/2022 3.7  3.5 - 5.2 g/dL Final    Total Bilirubin 02/14/2022 1.1 (A) 0.1 - 1.0 mg/dL Final    Alkaline Phosphatase 02/14/2022 95  55 - 135 U/L Final    AST 02/14/2022 21  10 - 40 U/L Final    ALT 02/14/2022 21  10 - 44 U/L Final    Anion Gap 02/14/2022 8  8 - 16 mmol/L Final    eGFR if African American 02/14/2022 >60.0  >60 mL/min/1.73 m^2 Final    eGFR if non African American 02/14/2022 >60.0  >60 mL/min/1.73 m^2 Final    Hemoglobin A1C 02/14/2022 5.4  4.0 - 5.6 % Final    Estimated Avg Glucose 02/14/2022 108  68 - 131 mg/dL Final    Vit D, 25-Hydroxy 02/14/2022 37  30 - 96 ng/mL Final   Lab Visit on 01/28/2022   Component Date Value Ref Range Status    PTH, Intact 01/28/2022 16.7  9.0 - 77.0 pg/mL Final    Ionized Calcium 01/28/2022 1.23  1.06 - 1.42 mmol/L Final    Calcium 01/28/2022 10.0  8.7 - 10.5 mg/dL Final   Lab Visit on 01/20/2022   Component Date Value Ref Range Status    PTH, Intact 01/20/2022 <5.0 (A) 9.0 - 77.0 pg/mL Final    Ionized Calcium 01/20/2022 1.39  1.06 - 1.42 mmol/L Final    Calcium 01/20/2022 10.7 (A) 8.7 - 10.5 mg/dL Final   Lab Visit on  01/18/2022   Component Date Value Ref Range Status    PTH, Intact 01/18/2022 <5.0 (A) 9.0 - 77.0 pg/mL Final    Ionized Calcium 01/18/2022 1.41  1.06 - 1.42 mmol/L Final    Calcium 01/18/2022 10.7 (A) 8.7 - 10.5 mg/dL Final   No results displayed because visit has over 200 results.      Admission on 12/24/2021, Discharged on 12/25/2021   Component Date Value Ref Range Status    WBC 12/24/2021 8.65  3.90 - 12.70 K/uL Final    RBC 12/24/2021 2.96 (A) 4.60 - 6.20 M/uL Final    Hemoglobin 12/24/2021 8.6 (A) 14.0 - 18.0 g/dL Final    Hematocrit 12/24/2021 25.9 (A) 40.0 - 54.0 % Final    MCV 12/24/2021 88  82 - 98 fL Final    MCH 12/24/2021 29.1  27.0 - 31.0 pg Final    MCHC 12/24/2021 33.2  32.0 - 36.0 g/dL Final    RDW 12/24/2021 12.0  11.5 - 14.5 % Final    Platelets 12/24/2021 220  150 - 450 K/uL Final    MPV 12/24/2021 10.6  9.2 - 12.9 fL Final    Immature Granulocytes 12/24/2021 0.5  0.0 - 0.5 % Final    Gran # (ANC) 12/24/2021 7.7  1.8 - 7.7 K/uL Final    Immature Grans (Abs) 12/24/2021 0.04  0.00 - 0.04 K/uL Final    Lymph # 12/24/2021 0.4 (A) 1.0 - 4.8 K/uL Final    Mono # 12/24/2021 0.4  0.3 - 1.0 K/uL Final    Eos # 12/24/2021 0.1  0.0 - 0.5 K/uL Final    Baso # 12/24/2021 0.02  0.00 - 0.20 K/uL Final    nRBC 12/24/2021 0  0 /100 WBC Final    Gran % 12/24/2021 88.5 (A) 38.0 - 73.0 % Final    Lymph % 12/24/2021 5.1 (A) 18.0 - 48.0 % Final    Mono % 12/24/2021 4.9  4.0 - 15.0 % Final    Eosinophil % 12/24/2021 0.8  0.0 - 8.0 % Final    Basophil % 12/24/2021 0.2  0.0 - 1.9 % Final    Differential Method 12/24/2021 Automated   Final    Sodium 12/24/2021 133 (A) 136 - 145 mmol/L Final    Potassium 12/24/2021 3.9  3.5 - 5.1 mmol/L Final    Chloride 12/24/2021 102  95 - 110 mmol/L Final    CO2 12/24/2021 23  23 - 29 mmol/L Final    Glucose 12/24/2021 221 (A) 70 - 110 mg/dL Final    BUN 12/24/2021 29 (A) 8 - 23 mg/dL Final    Creatinine 12/24/2021 1.0  0.5 - 1.4 mg/dL Final    Calcium  12/24/2021 8.8  8.7 - 10.5 mg/dL Final    Total Protein 12/24/2021 6.0  6.0 - 8.4 g/dL Final    Albumin 12/24/2021 3.3 (A) 3.5 - 5.2 g/dL Final    Total Bilirubin 12/24/2021 1.0  0.1 - 1.0 mg/dL Final    Alkaline Phosphatase 12/24/2021 72  55 - 135 U/L Final    AST 12/24/2021 14  10 - 40 U/L Final    ALT 12/24/2021 13  10 - 44 U/L Final    Anion Gap 12/24/2021 8  8 - 16 mmol/L Final    eGFR if African American 12/24/2021 >60.0  >60 mL/min/1.73 m^2 Final    eGFR if non African American 12/24/2021 >60.0  >60 mL/min/1.73 m^2 Final    Troponin I 12/24/2021 <0.030  <=0.040 ng/mL Final    CPK MB 12/24/2021 0.7  0.1 - 6.5 ng/mL Final    CPK 12/24/2021 48  20 - 200 U/L Final    Magnesium 12/24/2021 1.6  1.6 - 2.6 mg/dL Final    Specimen UA 12/24/2021 Urine, Clean Catch   Final    Color, UA 12/24/2021 Yellow  Yellow, Straw, Lydia Final    Appearance, UA 12/24/2021 Clear  Clear Final    pH, UA 12/24/2021 7.0  5.0 - 8.0 Final    Specific Gravity, UA 12/24/2021 1.025  1.005 - 1.030 Final    Protein, UA 12/24/2021 Trace (A) Negative Final    Glucose, UA 12/24/2021 3+ (A) Negative Final    Ketones, UA 12/24/2021 Trace (A) Negative Final    Bilirubin (UA) 12/24/2021 Negative  Negative Final    Occult Blood UA 12/24/2021 Negative  Negative Final    Nitrite, UA 12/24/2021 Negative  Negative Final    Urobilinogen, UA 12/24/2021 Negative  Negative EU/dL Final    Leukocytes, UA 12/24/2021 Negative  Negative Final    PT 12/24/2021 15.3 (A) 11.4 - 13.7 sec Final    INR 12/24/2021 1.3   Final    aPTT 12/24/2021 39.7 (A) 23.3 - 35.1 sec Final    SARS-CoV-2 RNA, Amplification, Qual 12/24/2021 Negative  Negative Final    Group & Rh 12/24/2021 B POS   Final    Indirect Darrick 12/24/2021 NEG   Final    POC Creatinine 12/24/2021 1.0  0.5 - 1.4 mg/dL Final    Sample 12/24/2021 VENOUS   Final    RBC, UA 12/24/2021 0  0 - 4 /hpf Final    WBC, UA 12/24/2021 0  0 - 5 /hpf Final    Bacteria 12/24/2021 Negative   "None-Occ /hpf Final    Yeast, UA 2021 None  None Final    Squam Epithel, UA 2021 1  /hpf Final    Hyaline Casts, UA 2021 0  0-1/lpf /lpf Final    Microscopic Comment 2021 SEE COMMENT   Final   Hospital Outpatient Visit on 12/15/2021   Component Date Value Ref Range Status    POC Glucose 12/15/2021 127 (A) 70 - 110 Final   Admission on 2021, Discharged on 2021   Component Date Value Ref Range Status    POCT Glucose 2021 135 (A) 70 - 110 mg/dL Final    Final Pathologic Diagnosis 2021    Final                    Value:1. ANTERIOR LARYNX, BIOPSIES: Invasive moderately differentiated squamous  carcinoma  2. SUBGLOTTIS, BIOPSIES: Invasive moderately differentiated squamous carcinoma      Gross 2021    Final                    Value::  1950     MRN:  [id number]      Pathology ID: [id number]  Part 1:  Received in formalin labeled as "anterior larynx" are 4  unoreinatable fragments of tan-white soft tissue ranging from less than  0.1-0.5 cm in greatest dimension.  The specimen is filtered, wrapped in  filter bag, submitted entirely in cassette [id number].  Part 2:  Received in formalin labeled as "subglottis" are multiple fragments  of tan-pink to tan-red friable soft tissue ranging from 0.2-0.9 cm in  greatest dimension, 1.4 x 1.0 x 0.3 cm in aggregate.  The specimen is  filtered, wrapped in filter bag, submitted entirely in cassette  [id number].  -Dana Hodges      Disclaimer 2021    Final                    Value:Unless the case is a 'gross only' or additional testing only, the final  diagnosis for each specimen is based on a microscopic examination of  appropriate tissue sections.     There may be more visits with results that are not included.         Plan:           Adhesive capsulitis of both shoulders  Comments:  Patient will be going for physical therapy from Thursday.  Let us see what comes out of it and if range of motion " exercises help him. Consider orthopedics later    Flu vaccine need  -     Influenza - Quadrivalent - High Dose (65+) (PF) (IM)    Today is a brief focused visit on his issue of shoulder pain.  Patient probably has some degree of frozen shoulder with some pain on the right side but not much pain on the left side.  This happened apparently after the surgery.  Probably it might be on anesthesia for too long.    During this limited visit I did not have ample opportunity to do through his numerous records for the last several years or so including Oncology, endocrinology.    I will wait for the PT evaluation.    Flu shot will be given today.    Follow-up in 2-3 months to review his complex medical issues.    He is updated on the 3 COVID-19 vaccines.    Level 2 visit      Current Outpatient Medications:     apixaban (ELIQUIS) 5 mg Tab, Take 1 tablet (5 mg total) by mouth 2 (two) times daily., Disp: 180 tablet, Rfl: 2    aspirin (ECOTRIN) 81 MG EC tablet, Take 1 tablet by mouth Daily., Disp: , Rfl:     blood sugar diagnostic (BLOOD GLUCOSE TEST) Strp, 1 strip by Misc.(Non-Drug; Combo Route) route 2 (two) times daily before meals., Disp: 200 strip, Rfl: 2    ciclopirox (LOPROX) 0.77 % Crea, Apply topically 2 (two) times daily., Disp: 1 Tube, Rfl: 2    diltiaZEM (CARDIZEM) 30 MG tablet, take 1 tablet (30 mg total) by Per G Tube route every 6 (six) hours., Disp: 120 tablet, Rfl: 2    ergocalciferol (ERGOCALCIFEROL) 50,000 unit Cap, TAKE 1 CAPSULE TWICE WEEKLY, Disp: 24 capsule, Rfl: 3    ipratropium (ATROVENT) 21 mcg (0.03 %) nasal spray, 2 sprays by Nasal route 2 (two) times daily., Disp: 30 mL, Rfl: 11    levothyroxine (SYNTHROID) 100 MCG tablet, Take 1 tablet (100 mcg total) by mouth before breakfast., Disp: 30 tablet, Rfl: 11    losartan (COZAAR) 100 MG tablet, TAKE 1 TABLET BY MOUTH EVERY DAY (Patient taking differently: Take 100 mg by mouth every evening.), Disp: 90 tablet, Rfl: 3    oxyCODONE (ROXICODONE) 5  "mg/5 mL Soln, take 5 mLs (5 mg total) by Per G Tube route every 6 (six) hours as needed (pain)., Disp: 473 mL, Rfl: 0    pen needle, diabetic (BD ULTRA-FINE YULISSA PEN NEEDLE) 32 gauge x 5/32" Ndle, Use once weekly., Disp: 30 each, Rfl: 1  No current facility-administered medications for this visit.    Facility-Administered Medications Ordered in Other Visits:     lactated ringers infusion, , Intravenous, Continuous, Torsten Hilton MD    "

## 2022-03-03 ENCOUNTER — PATIENT MESSAGE (OUTPATIENT)
Dept: OTOLARYNGOLOGY | Facility: CLINIC | Age: 72
End: 2022-03-03
Payer: MEDICARE

## 2022-03-08 ENCOUNTER — OFFICE VISIT (OUTPATIENT)
Dept: FAMILY MEDICINE | Facility: CLINIC | Age: 72
End: 2022-03-08
Payer: MEDICARE

## 2022-03-08 ENCOUNTER — PATIENT MESSAGE (OUTPATIENT)
Dept: OTOLARYNGOLOGY | Facility: CLINIC | Age: 72
End: 2022-03-08
Payer: MEDICARE

## 2022-03-08 VITALS
SYSTOLIC BLOOD PRESSURE: 133 MMHG | WEIGHT: 143 LBS | DIASTOLIC BLOOD PRESSURE: 79 MMHG | HEIGHT: 66 IN | HEART RATE: 89 BPM | BODY MASS INDEX: 22.98 KG/M2

## 2022-03-08 DIAGNOSIS — M75.02 ADHESIVE CAPSULITIS OF BOTH SHOULDERS: Primary | ICD-10-CM

## 2022-03-08 DIAGNOSIS — Z23 FLU VACCINE NEED: ICD-10-CM

## 2022-03-08 DIAGNOSIS — M75.01 ADHESIVE CAPSULITIS OF BOTH SHOULDERS: Primary | ICD-10-CM

## 2022-03-08 PROCEDURE — 3044F HG A1C LEVEL LT 7.0%: CPT | Mod: S$GLB,,, | Performed by: INTERNAL MEDICINE

## 2022-03-08 PROCEDURE — 99212 OFFICE O/P EST SF 10 MIN: CPT | Mod: 25,S$GLB,, | Performed by: INTERNAL MEDICINE

## 2022-03-08 PROCEDURE — G0008 ADMIN INFLUENZA VIRUS VAC: HCPCS | Mod: S$GLB,,, | Performed by: INTERNAL MEDICINE

## 2022-03-08 PROCEDURE — 3288F PR FALLS RISK ASSESSMENT DOCUMENTED: ICD-10-PCS | Mod: S$GLB,,, | Performed by: INTERNAL MEDICINE

## 2022-03-08 PROCEDURE — 1159F PR MEDICATION LIST DOCUMENTED IN MEDICAL RECORD: ICD-10-PCS | Mod: S$GLB,,, | Performed by: INTERNAL MEDICINE

## 2022-03-08 PROCEDURE — 3288F FALL RISK ASSESSMENT DOCD: CPT | Mod: S$GLB,,, | Performed by: INTERNAL MEDICINE

## 2022-03-08 PROCEDURE — 90662 FLU VACCINE - QUADRIVALENT - HIGH DOSE (65+) PRESERVATIVE FREE IM: ICD-10-PCS | Mod: S$GLB,,, | Performed by: INTERNAL MEDICINE

## 2022-03-08 PROCEDURE — 1101F PR PT FALLS ASSESS DOC 0-1 FALLS W/OUT INJ PAST YR: ICD-10-PCS | Mod: S$GLB,,, | Performed by: INTERNAL MEDICINE

## 2022-03-08 PROCEDURE — 3066F NEPHROPATHY DOC TX: CPT | Mod: S$GLB,,, | Performed by: INTERNAL MEDICINE

## 2022-03-08 PROCEDURE — 1160F RVW MEDS BY RX/DR IN RCRD: CPT | Mod: S$GLB,,, | Performed by: INTERNAL MEDICINE

## 2022-03-08 PROCEDURE — 3060F POS MICROALBUMINURIA REV: CPT | Mod: S$GLB,,, | Performed by: INTERNAL MEDICINE

## 2022-03-08 PROCEDURE — 99212 PR OFFICE/OUTPT VISIT, EST, LEVL II, 10-19 MIN: ICD-10-PCS | Mod: 25,S$GLB,, | Performed by: INTERNAL MEDICINE

## 2022-03-08 PROCEDURE — 1125F AMNT PAIN NOTED PAIN PRSNT: CPT | Mod: S$GLB,,, | Performed by: INTERNAL MEDICINE

## 2022-03-08 PROCEDURE — 3060F PR POS MICROALBUMINURIA RESULT DOCUMENTED/REVIEW: ICD-10-PCS | Mod: S$GLB,,, | Performed by: INTERNAL MEDICINE

## 2022-03-08 PROCEDURE — 3008F PR BODY MASS INDEX (BMI) DOCUMENTED: ICD-10-PCS | Mod: S$GLB,,, | Performed by: INTERNAL MEDICINE

## 2022-03-08 PROCEDURE — 90662 IIV NO PRSV INCREASED AG IM: CPT | Mod: S$GLB,,, | Performed by: INTERNAL MEDICINE

## 2022-03-08 PROCEDURE — 1101F PT FALLS ASSESS-DOCD LE1/YR: CPT | Mod: S$GLB,,, | Performed by: INTERNAL MEDICINE

## 2022-03-08 PROCEDURE — 1160F PR REVIEW ALL MEDS BY PRESCRIBER/CLIN PHARMACIST DOCUMENTED: ICD-10-PCS | Mod: S$GLB,,, | Performed by: INTERNAL MEDICINE

## 2022-03-08 PROCEDURE — 1159F MED LIST DOCD IN RCRD: CPT | Mod: S$GLB,,, | Performed by: INTERNAL MEDICINE

## 2022-03-08 PROCEDURE — 1125F PR PAIN SEVERITY QUANTIFIED, PAIN PRESENT: ICD-10-PCS | Mod: S$GLB,,, | Performed by: INTERNAL MEDICINE

## 2022-03-08 PROCEDURE — G0008 FLU VACCINE - QUADRIVALENT - HIGH DOSE (65+) PRESERVATIVE FREE IM: ICD-10-PCS | Mod: S$GLB,,, | Performed by: INTERNAL MEDICINE

## 2022-03-08 PROCEDURE — 3008F BODY MASS INDEX DOCD: CPT | Mod: S$GLB,,, | Performed by: INTERNAL MEDICINE

## 2022-03-08 PROCEDURE — 3066F PR DOCUMENTATION OF TREATMENT FOR NEPHROPATHY: ICD-10-PCS | Mod: S$GLB,,, | Performed by: INTERNAL MEDICINE

## 2022-03-08 PROCEDURE — 3044F PR MOST RECENT HEMOGLOBIN A1C LEVEL <7.0%: ICD-10-PCS | Mod: S$GLB,,, | Performed by: INTERNAL MEDICINE

## 2022-03-08 NOTE — Clinical Note
Anabel Mr Lee- this patient appears to have something of a frozen shoulder after his surgery.  He was referred by ENT specialist for physical therapy evaluation.  Communication is sometimes difficult and his usually accomplished with board which he has and hand.  He is status post laryngectomy.  Dr. Yesenia DONOVAN

## 2022-03-10 ENCOUNTER — CLINICAL SUPPORT (OUTPATIENT)
Dept: REHABILITATION | Facility: HOSPITAL | Age: 72
End: 2022-03-10
Payer: MEDICARE

## 2022-03-10 DIAGNOSIS — M25.512 ACUTE PAIN OF BOTH SHOULDERS: ICD-10-CM

## 2022-03-10 DIAGNOSIS — C32.9 LARYNX NEOPLASM MALIGNANT: ICD-10-CM

## 2022-03-10 DIAGNOSIS — M25.511 ACUTE PAIN OF BOTH SHOULDERS: ICD-10-CM

## 2022-03-10 DIAGNOSIS — R29.898 BILATERAL ARM WEAKNESS: ICD-10-CM

## 2022-03-10 PROCEDURE — 97161 PT EVAL LOW COMPLEX 20 MIN: CPT | Mod: PN

## 2022-03-10 PROCEDURE — 97110 THERAPEUTIC EXERCISES: CPT | Mod: PN

## 2022-03-11 NOTE — PLAN OF CARE
[facility]  Physical Therapy Initial Evaluation    Name: Cyrus Batres Jr.  Clinic Number: [id number]    Therapy Diagnosis:   Encounter Diagnoses   Name Primary?    Larynx neoplasm malignant     Acute pain of both shoulders     Bilateral arm weakness      Physician: Jesse James MD    Physician Orders: PT Eval and Treat   Medical Diagnosis from Referral: larynx neoplasm malignant  Evaluation Date: 3/10/2022  Authorization Period Expiration: 03/03/2023  Plan of Care Expiration: 04/16/22  Visit # / Visits authorized: 1/ 1  (POC 1/11)    Time In: 1045  Time Out: 1130  Total Billable Time: 45 minutes    Precautions: recent laryngectomy  Insurance: Payor: [employer] MANAGED MEDICARE / Plan: [employer] MEDICARE HMO / Product Type: Capitation /        Subjective     Date of onset: 03/03/22  History of current condition - Nicolás reports: 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     Medical History:   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     Surgical History:   Cyrus Batres Jr.  has a past surgical history that includes Microlaryngoscopy (N/A, 3/17/2020); Laryngoscopy (N/A, 8/4/2020); Laryngoscopy (N/A, 3/16/2021); Laryngoscopy (N/A, 4/1/2021); Laryngoscopy (N/A, 12/9/2021); Tracheostomy (N/A, 12/27/2021); Direct laryngobronchoscopy (N/A, 12/27/2021); Laryngectomy (N/A, 1/6/2022); Dissection of neck (Bilateral, 1/6/2022); Thyroidectomy (1/6/2022); Laryngoscopy (N/A, 1/6/2022); Reimplantation of parathyroid tissue (N/A, 1/6/2022); and Flap procedure (Right, 1/6/2022).    Medications:   Cyrus has a current medication list which includes the following  prescription(s): apixaban, aspirin, blood sugar diagnostic, ciclopirox, diltiazem, ergocalciferol, ipratropium, levothyroxine, losartan, oxycodone, and pen needle, diabetic, and the following Facility-Administered Medications: lactated ringers.    Allergies:   Review of patient's allergies indicates:   Allergen Reactions    Pollen extracts     Lovastatin Rash     Not confirmed but pt skeptical      Imaging (CT of neck on 11/24/21): Geographic heterogeneous hypoattenuating region at the site of previously described anterior left vocal cord mass newly extending into the soft tissues just anterior and inferior to the thyroid cartilage, with faint marginal enhancement. While this is concerning for residual/recurrent disease, other considerations including necrosis and infection are also a consideration.    Prior Therapy: none  Social History:  lives with his spouse in 1-story home (no steps)  Occupation: retired  Prior Level of Function: independent   Current Level of Function: minimal difficulty with activities of daily living     Pain:  Current 1/10, worst 4/10, best 0/10   Location: bilateral shoulders   Description: Aching and Dull  Aggravating Factors: active range of motion into abduction   Easing Factors: rest    Pts goals: decrease complaints of pain; improve strength      Objective     Posture: guarded due to pain  Palpation: pain with palpation to shoulder area  Sensation: intact    Range of Motion/Strength:     Shoulder Left Right Pain/Dysfunction with Movement   AROM      flexion  120  120    abduction  90  90    external rotation   70  70    internal rotation   70  70        UE MMT Left Right Pain/Dysfunction with Movement   Shoulder Flexion 4-/5. 4-/5.    Shoulder Abduction 3+/5. 3+/5.    Elbow Flexion 4/5. 4/5.    Elbow Extension 4/5. 4/5.     4/5. 4/5.      Special Tests Left Right Comments   White & Alverto negative negative    Drop arm negative negative    Apprehension negative negative         Other: Upper Extremity Functional Index = 71/80        CMS Impairment/Limitation/Restriction for FOTO * n/a Survey    Therapist reviewed FOTO scores for Cyrus Batres Jr. on 3/10/2022.     Limitation Score: * n/a%    * n/a = patient not logged into FOTO         TREATMENT     Treatment Time In: 1115  Treatment Time Out: 1130  Total Treatment time separate from Evaluation: 15 minutes    Nicolás received therapeutic exercises to develop strength for 15 minutes including:     X 15 each standing bilateral towel circles = clockwise/counterclockwise    X 15 each standing dumbbell therapeutic exercise to 90* (1#) = flexion, scaption   X 15 each standing theraband therapeutic exercise (green theraband) = external rotation, internal rotation       Home Exercises and Patient Education Provided    Education provided:   - proper therapeutic exercise technique  - treatment plan    Written Home Exercises Provided: to be provided at future appointment.      Assessment     Cyrus is a 71 y.o. male referred to outpatient Physical Therapy with a medical diagnosis of larynx neoplasm malignant. Pt presents with bilateral shoulder pain and weakness that limits his ability to perform his activities of daily living.    Pt prognosis is Good.   Pt will benefit from skilled outpatient Physical Therapy to address the deficits stated above and in the chart below, provide pt/family education, and to maximize pt's level of independence.     Plan of care discussed with patient: Yes  Pt's spiritual, cultural and educational needs considered and patient is agreeable to the plan of care and goals as stated below:     Anticipated Barriers for therapy: severity of pain    Medical Necessity is demonstrated by the following  History  Co-morbidities and personal factors that may impact the plan of care Co-morbidities:   diabetes and HTN    Personal Factors:   no deficits     moderate   Examination  Body Structures and Functions, activity limitations  and participation restrictions that may impact the plan of care Body Regions:   neck  upper extremities    Body Systems:    ROM  strength    Participation Restrictions:   none    Activity limitations:   Learning and applying knowledge  no deficits    General Tasks and Commands  no deficits    Communication  no deficits    Mobility  lifting and carrying objects  driving (bike, car, motorcycle)    Self care  washing oneself (bathing, drying, washing hands)  caring for body parts (brushing teeth, shaving, grooming)  toileting  dressing    Domestic Life  shopping  cooking  doing house work (cleaning house, washing dishes, laundry)    Interactions/Relationships  no deficits    Life Areas  no deficits    Community and Social Life  no deficits         high   Clinical Presentation stable and uncomplicated low   Decision Making/ Complexity Score: low     Goals:  Short Term Goals (STG) # weeks Goal Review Date Reviewed Status   1. The patient will begin a written home exercise program. 3 Initial 3/10/2022    2. Decrease patient's complaints of pain to 0/10 during performance of activities of daily living for independence of self care activities. 3 Initial 3/10/2022    3. Patient to report an improvement in his ability to sleep from moderate difficulty to a little bit of difficulty. 3 Initial 3/10/2022        Long Term Goals (LTG) # weeks Goal Review Date Reviewed Status   1. The patient will be independent with a home exercise program for maintenace. 5 Initial 3/10/2022    2. Patient to improve bilateral shoulder abduction MMT 1/2 grade to demo strength gains from therapeutic intervention. 5 Initial 3/10/2022    3. Patient to achieve full active range of motion of bilateral shoulders in all ranges. 5 Initial 3/10/2022        Plan     Plan of care Certification: 3/10/2022 to 04/16/22.    Outpatient Physical Therapy evaluation, plus 2 times weekly for 5 weeks to include the following interventions (starting week of 03/14/22):  Manual Therapy, Moist Heat/ Ice, Neuromuscular Re-ed, Patient Education, Therapeutic Activities, Therapeutic Exercise and HEP .     Omero Lee, PT

## 2022-03-14 ENCOUNTER — OFFICE VISIT (OUTPATIENT)
Dept: SURGERY | Facility: CLINIC | Age: 72
End: 2022-03-14
Payer: MEDICARE

## 2022-03-14 ENCOUNTER — PATIENT MESSAGE (OUTPATIENT)
Dept: SPEECH THERAPY | Facility: HOSPITAL | Age: 72
End: 2022-03-14
Payer: MEDICARE

## 2022-03-14 VITALS
BODY MASS INDEX: 23.46 KG/M2 | TEMPERATURE: 98 F | HEART RATE: 79 BPM | SYSTOLIC BLOOD PRESSURE: 120 MMHG | WEIGHT: 145.94 LBS | HEIGHT: 66 IN | DIASTOLIC BLOOD PRESSURE: 62 MMHG

## 2022-03-14 DIAGNOSIS — Z90.02 S/P LARYNGECTOMY: Primary | ICD-10-CM

## 2022-03-14 PROCEDURE — 3060F PR POS MICROALBUMINURIA RESULT DOCUMENTED/REVIEW: ICD-10-PCS | Mod: CPTII,S$GLB,, | Performed by: SURGERY

## 2022-03-14 PROCEDURE — 3074F PR MOST RECENT SYSTOLIC BLOOD PRESSURE < 130 MM HG: ICD-10-PCS | Mod: CPTII,S$GLB,, | Performed by: SURGERY

## 2022-03-14 PROCEDURE — 3078F DIAST BP <80 MM HG: CPT | Mod: CPTII,S$GLB,, | Performed by: SURGERY

## 2022-03-14 PROCEDURE — 3044F PR MOST RECENT HEMOGLOBIN A1C LEVEL <7.0%: ICD-10-PCS | Mod: CPTII,S$GLB,, | Performed by: SURGERY

## 2022-03-14 PROCEDURE — 3078F PR MOST RECENT DIASTOLIC BLOOD PRESSURE < 80 MM HG: ICD-10-PCS | Mod: CPTII,S$GLB,, | Performed by: SURGERY

## 2022-03-14 PROCEDURE — 1159F MED LIST DOCD IN RCRD: CPT | Mod: CPTII,S$GLB,, | Performed by: SURGERY

## 2022-03-14 PROCEDURE — 3008F PR BODY MASS INDEX (BMI) DOCUMENTED: ICD-10-PCS | Mod: CPTII,S$GLB,, | Performed by: SURGERY

## 2022-03-14 PROCEDURE — 3066F NEPHROPATHY DOC TX: CPT | Mod: CPTII,S$GLB,, | Performed by: SURGERY

## 2022-03-14 PROCEDURE — 3074F SYST BP LT 130 MM HG: CPT | Mod: CPTII,S$GLB,, | Performed by: SURGERY

## 2022-03-14 PROCEDURE — 3008F BODY MASS INDEX DOCD: CPT | Mod: CPTII,S$GLB,, | Performed by: SURGERY

## 2022-03-14 PROCEDURE — 3066F PR DOCUMENTATION OF TREATMENT FOR NEPHROPATHY: ICD-10-PCS | Mod: CPTII,S$GLB,, | Performed by: SURGERY

## 2022-03-14 PROCEDURE — 1126F AMNT PAIN NOTED NONE PRSNT: CPT | Mod: CPTII,S$GLB,, | Performed by: SURGERY

## 2022-03-14 PROCEDURE — 99024 PR POST-OP FOLLOW-UP VISIT: ICD-10-PCS | Mod: S$GLB,,, | Performed by: SURGERY

## 2022-03-14 PROCEDURE — 1126F PR PAIN SEVERITY QUANTIFIED, NO PAIN PRESENT: ICD-10-PCS | Mod: CPTII,S$GLB,, | Performed by: SURGERY

## 2022-03-14 PROCEDURE — 1159F PR MEDICATION LIST DOCUMENTED IN MEDICAL RECORD: ICD-10-PCS | Mod: CPTII,S$GLB,, | Performed by: SURGERY

## 2022-03-14 PROCEDURE — 99024 POSTOP FOLLOW-UP VISIT: CPT | Mod: S$GLB,,, | Performed by: SURGERY

## 2022-03-14 PROCEDURE — 3060F POS MICROALBUMINURIA REV: CPT | Mod: CPTII,S$GLB,, | Performed by: SURGERY

## 2022-03-14 PROCEDURE — 99999 PR PBB SHADOW E&M-EST. PATIENT-LVL III: CPT | Mod: PBBFAC,,, | Performed by: SURGERY

## 2022-03-14 PROCEDURE — 3044F HG A1C LEVEL LT 7.0%: CPT | Mod: CPTII,S$GLB,, | Performed by: SURGERY

## 2022-03-14 PROCEDURE — 99999 PR PBB SHADOW E&M-EST. PATIENT-LVL III: ICD-10-PCS | Mod: PBBFAC,,, | Performed by: SURGERY

## 2022-03-14 NOTE — PROGRESS NOTES
"  Post-Op Follow-up Visit:   3/14/2022  Patient ID: Cyrus Batres Jr. is a 71 y.o. male, born 1950    Chief Complaint   Patient presents with    Follow-up     PEG Removal       Interval History: Mr. Batres presents to clinic today for PEG tube removal following PEG placement on December 27th, 2021. This was placed due to history of laryngeal cancer which required total laryngectomy and bilateral neck dissection with free flap.  Since his procedure he has been doing well and is now consuming sufficient p.o. intake.  He is no longer requiring his PEG tube for any medication or enteral feeding at this time, and has not used it in approximately 1 month.  Has gained approximately 25 lb since his PEG tube placement.  Denies any recent issues with this PEG tube site.  Denies any swallowing difficulties with the exception of peanut butter.  Denies any fever, chills, nausea, vomiting.    Physical Exam:  /62 (BP Location: Left arm, Patient Position: Sitting)   Pulse 79   Temp 97.8 °F (36.6 °C)   Ht 5' 6" (1.676 m)   Wt 66.2 kg (145 lb 15.1 oz)   BMI 23.56 kg/m²     General:  Non-toxic, ambulatory  Abd:  Soft, non-tender  Incision:  Peg tube site is clean, with appropriate granulation tissue around the tract.    Pathology:  Pathology Results  (Last 10 years)               01/06/22 4720  Specimen to Pathology, Surgery ENT Final result    Narrative:  Pre-op Diagnosis: Larynx neoplasm malignant [C32.9]   Procedure(s):   LARYNGECTOMY   DISSECTION, NECK   CREATION, FREE FLAP   THYROIDECTOMY   LARYNGOSCOPY   REIMPLANTATION, PARATHYROID TISSUE   Number of specimens: 8   Name of specimens:   1. Left neck dissection levels 2, 3, and 4 - Permanent   2. Right neck dissection levels 2, 3, and 4 - Permanent   3. Rule out parathyroid - Frozen   4. Total laryngectomy, total thyroidectomy, bilateral level 6   neck dissection - Permanent   5. Posterior tracheal margin - Frozen   6. Anterior tracheal margin - Frozen   7. " Posterior tracheal margin #2 - Frozen   8. Anterior tracheal margin #2 - Frozen   Release to patient->Immediate   Specimen total (fresh, frozen, permanent):->8       12/27/21 1302  Specimen to Pathology, Surgery ENT Final result    Narrative:  Pre-op Diagnosis: Hemoptysis [R04.2]   Procedure(s):   CREATION, TRACHEOSTOMY   LARYNGOSCOPY, DIRECT, WITH BRONCHOSCOPY   INSERTION, PEG TUBE   Number of specimens: 1   Name of specimens:   1) Cricoid- Permanent   Release to patient->Immediate   Specimen total (fresh, frozen, permanent):->1       12/09/21 1312  Specimen to Pathology, Surgery ENT Final result    Narrative:  Pre-op Diagnosis: Larynx neoplasm malignant [C32.9]   Adverse effect of radiation, sequela [T66.XXXS]   Procedure(s):   Suspension microlaryngoscopy with biopsy   Number of specimens: 2   Name of specimens:   1.) Anterior larynx- Permanent.   2.) Subglottis- Permanent.   Release to patient->Immediate   Specimen total (fresh, frozen, permanent):->2       05/06/21 1008  Specimen to Pathology, Surgery ENT Final result    Narrative:  Pre-op Diagnosis: Larynx neoplasm malignant [C32.9]   Post-op Diagnosis: Same   Procedure(s):   MICROLARYNGOSCOPY, SUSPENSION, USING LASER   Number of specimens: 1   Name of specimens:   1) Deep anterior right true vocal fold - Frozen   Specimen total (fresh, frozen, permanent):->1       04/01/21 1654  Specimen to Pathology, Surgery ENT Final result    Narrative:  Pre-op Diagnosis: Larynx neoplasm malignant [C32.9]   Procedure(s):   Suspension microlaryngoscopy with KTP laser excision of   lesion   Number of specimens: 5   Name of specimens:   1. Deep left true vocal fold - frozen   2. Anterior right true vocal fold - frozen   3. Infraglottic anterior right true vocal fold - frozen   4. Re-excision deep left true vocal fold, deep margin is   inked - frozen   5. 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 Anterior left true vocal fold - frozen   2. Anterior commissure - frozen   3. Deep left true vocal fold - frozen   4. Deep margin - frozen   5. Anterior commissure, infraglottic - frozen   6. Left subglottis - frozen   7. Left vocal fold lateral mucosal margin - frozen   8. Left vocal fold posterior mucosal margin - frozen   9.Deep anterior right true vocal fold - frozen   10. Right vocal fold posterior mucosal margin - frozen   11. 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 Left true vocal fold- frozen.   2. Left true vocal fold- Permanent.   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 PEG tube removed today in clinic without any apparent complication.  2. Continue wound care to area in order to decrease discharge.  At this discharge continues for longer than approximately 1 week he  will need to call us back for further considerations.  3. He can follow-up gabbi.        Maurilio Morales M.D.  PGY-2  [facility]

## 2022-03-16 ENCOUNTER — PATIENT MESSAGE (OUTPATIENT)
Dept: REHABILITATION | Facility: HOSPITAL | Age: 72
End: 2022-03-16

## 2022-03-22 ENCOUNTER — CLINICAL SUPPORT (OUTPATIENT)
Dept: REHABILITATION | Facility: HOSPITAL | Age: 72
End: 2022-03-22
Payer: MEDICARE

## 2022-03-22 DIAGNOSIS — M25.511 ACUTE PAIN OF BOTH SHOULDERS: ICD-10-CM

## 2022-03-22 DIAGNOSIS — R29.898 BILATERAL ARM WEAKNESS: ICD-10-CM

## 2022-03-22 DIAGNOSIS — C32.9 LARYNX NEOPLASM MALIGNANT: Primary | ICD-10-CM

## 2022-03-22 DIAGNOSIS — M25.512 ACUTE PAIN OF BOTH SHOULDERS: ICD-10-CM

## 2022-03-22 PROCEDURE — 97110 THERAPEUTIC EXERCISES: CPT | Mod: PN

## 2022-03-22 NOTE — PROGRESS NOTES
[facility]   Physical Therapy Treatment Note     Name: Cyrus Batres Jr.  Clinic Number: [id number]    Therapy Diagnosis:   Encounter Diagnoses   Name Primary?    Larynx neoplasm malignant Yes    Bilateral arm weakness     Acute pain of both shoulders      Physician: Jesse James MD    Visit Date: 3/22/2022    Physician Orders: PT Eval and Treat   Medical Diagnosis from Referral: larynx neoplasm malignant  Evaluation Date: 3/10/2022  Authorization Period Expiration: 03/03/2023  Plan of Care Expiration: 04/16/22  Visit # / Visits authorized: 1/ 20  (POC 2/11)     Time In: 1220   Time Out: 1300  Total Billable Time: 40 minutes     Precautions: recent laryngectomy      SUBJECTIVE     Pt reports: no real change in his pain levels.  He does not have a home exercise program.  Response to previous treatment: no changes  Functional change: none    Pain: 1/10  Location: bilateral shoulders        OBJECTIVE     Objective Measures updated at progress report unless specified.     Treatment     Nicolás received the treatments listed below:      therapeutic exercises to develop strength, endurance and ROM for 40 minutes including:     X 3'/3' UBE to promote flexibility prior to range of motion/strength training   X 15 each standing scapular theraband training (green theraband) = rows, lat pull downs, upper trap rows    X 15 each standing bilateral towel circles = clockwise/counterclockwise               X 15 each standing bilateral shoulder theraband training (green theraband) = external rotation, internal rotation    X 15 each standing bilateral ball on wall scapular therapeutic exercise = side to side, up and down, clockwise/counterclockwise    X 10 each supine bilateral shoulder active assisted range of motion = flexion, abduction, internal rotation/external rotation       Patient Education and Home Exercises     Home Exercises Provided and Patient Education Provided     Education  provided:   - proper therapeutic exercise technique    Written Home Exercises Provided: to be provided at future appointment.       ASSESSMENT     Patient reported after treatment session that his left shoulder appears to be mildly stronger than his right shoulder.    Nicolás Is not progressing well towards his goals.   Pt prognosis is Good.     Pt will continue to benefit from skilled outpatient physical therapy to address the deficits listed in the problem list box on initial evaluation, provide pt/family education and to maximize pt's level of independence in the home and community environment.     Pt's spiritual, cultural and educational needs considered and pt agreeable to plan of care and goals.     Anticipated barriers to physical therapy: severity of pain; range of motion restrictions    Goals:     Short Term Goals (STG) # weeks Goal Review Date Reviewed Status   1. The patient will begin a written home exercise program. 3 Initial 3/10/2022  NOT MET   2. Decrease patient's complaints of pain to 0/10 during performance of activities of daily living for independence of self care activities. 3 Initial 3/10/2022 NOT MET    3. Patient to report an improvement in his ability to sleep from moderate difficulty to a little bit of difficulty. 3 Initial 3/10/2022 NOT MET          Long Term Goals (LTG) # weeks Goal Review Date Reviewed Status   1. The patient will be independent with a home exercise program for maintenace. 5 Initial 3/10/2022 NOT MET    2. Patient to improve bilateral shoulder abduction MMT 1/2 grade to demo strength gains from therapeutic intervention. 5 Initial 3/10/2022  NOT MET   3. Patient to achieve full active range of motion of bilateral shoulders in all ranges. 5 Initial 3/10/2022  NOT MET       PLAN     Continue to advance range of motion/strength training to patient's tolerance.      Omero Lee, PT

## 2022-03-25 ENCOUNTER — CLINICAL SUPPORT (OUTPATIENT)
Dept: REHABILITATION | Facility: HOSPITAL | Age: 72
End: 2022-03-25
Payer: MEDICARE

## 2022-03-25 DIAGNOSIS — M25.512 ACUTE PAIN OF BOTH SHOULDERS: ICD-10-CM

## 2022-03-25 DIAGNOSIS — C32.9 LARYNX NEOPLASM MALIGNANT: Primary | ICD-10-CM

## 2022-03-25 DIAGNOSIS — M25.511 ACUTE PAIN OF BOTH SHOULDERS: ICD-10-CM

## 2022-03-25 DIAGNOSIS — R29.898 BILATERAL ARM WEAKNESS: ICD-10-CM

## 2022-03-25 PROCEDURE — 97110 THERAPEUTIC EXERCISES: CPT | Mod: PN

## 2022-03-25 NOTE — PROGRESS NOTES
[facility]   Physical Therapy Treatment Note     Name: Cyrus Batres Jr.  Clinic Number: [id number]    Therapy Diagnosis:   Encounter Diagnoses   Name Primary?    Larynx neoplasm malignant Yes    Bilateral arm weakness     Acute pain of both shoulders      Physician: Jesse James MD    Visit Date: 3/25/2022    Physician Orders: PT Eval and Treat   Medical Diagnosis from Referral: larynx neoplasm malignant  Evaluation Date: 3/10/2022  Authorization Period Expiration: 03/03/2023  Plan of Care Expiration: 04/16/22  Visit # / Visits authorized: 2/ 20  (POC 3/11)     Time In: 1330   Time Out: 1415  Total Billable Time: 45 minutes     Precautions: recent laryngectomy      SUBJECTIVE     Pt reports: mild increase in his pain levels - did some gardening recently.  He does not have a home exercise program.  Response to previous treatment: increased pain  Functional change: none    Pain: 2-3/10  Location: bilateral shoulders        OBJECTIVE     Objective Measures updated at progress report unless specified.     Treatment     Nicolás received the treatments listed below:      therapeutic exercises to develop strength, endurance and ROM for 45 minutes including:     X 3'/3' overhead pulley to promote flexibility prior to range of motion/strength training   X 10 each supine bilateral shoulder active assisted range of motion = flexion, abduction, internal rotation/external rotation    X 20 each standing dumbbell therapeutic exercise to 90* (1#) = flexion, scaption   X 15 each seated scapular therapeutic exercise = retro rolls, shrugs, pinches   X 15 supine bilateral serratus punches (1#)   X 15 supine bilateral con/ecc shoulder flexion/extension (1#)      Patient Education and Home Exercises     Home Exercises Provided and Patient Education Provided     Education provided:   - proper therapeutic exercise technique    Written Home Exercises Provided: to be provided at future appointment.        ASSESSMENT     Patient was able to tolerate increased repetitions during standing dumbbell therapeutic exercise; reported after treatment session that he gets intermittent tingling in his hands.    Nicolás Is not progressing well towards his goals.   Pt prognosis is Good.     Pt will continue to benefit from skilled outpatient physical therapy to address the deficits listed in the problem list box on initial evaluation, provide pt/family education and to maximize pt's level of independence in the home and community environment.     Pt's spiritual, cultural and educational needs considered and pt agreeable to plan of care and goals.     Anticipated barriers to physical therapy: severity of pain; range of motion restrictions    Goals:     Short Term Goals (STG) # weeks Goal Review Date Reviewed Status   1. The patient will begin a written home exercise program. 3 Initial 3/10/2022  NOT MET   2. Decrease patient's complaints of pain to 0/10 during performance of activities of daily living for independence of self care activities. 3 Initial 3/10/2022 NOT MET    3. Patient to report an improvement in his ability to sleep from moderate difficulty to a little bit of difficulty. 3 Initial 3/10/2022 NOT MET          Long Term Goals (LTG) # weeks Goal Review Date Reviewed Status   1. The patient will be independent with a home exercise program for maintenace. 5 Initial 3/10/2022 NOT MET    2. Patient to improve bilateral shoulder abduction MMT 1/2 grade to demo strength gains from therapeutic intervention. 5 Initial 3/10/2022  NOT MET   3. Patient to achieve full active range of motion of bilateral shoulders in all ranges. 5 Initial 3/10/2022  NOT MET       PLAN     Continue to advance range of motion/strength training to patient's tolerance.      Omero Lee, PT

## 2022-03-29 ENCOUNTER — CLINICAL SUPPORT (OUTPATIENT)
Dept: REHABILITATION | Facility: HOSPITAL | Age: 72
End: 2022-03-29
Payer: MEDICARE

## 2022-03-29 DIAGNOSIS — M25.512 ACUTE PAIN OF BOTH SHOULDERS: ICD-10-CM

## 2022-03-29 DIAGNOSIS — R29.898 BILATERAL ARM WEAKNESS: ICD-10-CM

## 2022-03-29 DIAGNOSIS — C32.9 LARYNX NEOPLASM MALIGNANT: Primary | ICD-10-CM

## 2022-03-29 DIAGNOSIS — M25.511 ACUTE PAIN OF BOTH SHOULDERS: ICD-10-CM

## 2022-03-29 PROCEDURE — 97110 THERAPEUTIC EXERCISES: CPT | Mod: PN

## 2022-03-29 NOTE — PROGRESS NOTES
[facility]   Physical Therapy Progress Note     Name: Cyrus Batres Jr.  Clinic Number: [id number]    Therapy Diagnosis:   Encounter Diagnoses   Name Primary?    Larynx neoplasm malignant Yes    Bilateral arm weakness     Acute pain of both shoulders      Physician: Jesse James MD    Visit Date: 3/29/2022    Physician Orders: PT Eval and Treat   Medical Diagnosis from Referral: larynx neoplasm malignant  Evaluation Date: 3/10/2022  Authorization Period Expiration: 03/03/2023  Plan of Care Expiration: 04/16/22  Visit # / Visits authorized: 3/ 20  (POC 4/11)     Time In: 1345   Time Out: 1430  Total Billable Time: 45 minutes     Precautions: recent laryngectomy      SUBJECTIVE     Pt reports: mild decrease in his pain levels.  He does not have a home exercise program.  Response to previous treatment: decreased pain  Functional change: none    Pain: 1/10  Location: bilateral shoulders        OBJECTIVE     Posture: guarded due to pain  Palpation: pain with palpation to shoulder area  Sensation: intact    Range of Motion/Strength:      Shoulder Left Right Pain/Dysfunction with Movement   AROM         flexion  156 150     abduction  154 165     external rotation   70  82     internal rotation   87  75        Treatment     Nicolás received the treatments listed below:      therapeutic exercises to develop strength, endurance and ROM for 45 minutes including:     X 3'/3' overhead pulley to promote flexibility prior to range of motion/strength training   X 10 each supine bilateral shoulder active assisted range of motion = flexion, abduction   X 15 each supine bilateral therabar therapeutic exercise (2#) = flexion, scaption   X 15 each standing bilateral dumbbell therapeutic exercise (2#) = elbow curls, tricep extensions, punch outs   X 15 each standing bilateral bent over scapular dumbbell therapeutic exercise (2#) = rows and horizontal abduction    X 15 each standing bilateral towel  circles (1#) = clockwise/counterclockwise    X 15 each standing bilateral shoulder theraband training (green theraband) = external rotation, internal rotation       Patient Education and Home Exercises     Home Exercises Provided and Patient Education Provided     Education provided:   - proper therapeutic exercise technique    Written Home Exercises Provided: to be provided at future appointment.       ASSESSMENT     Patient was able to tolerate increased time on the UBE; increased resistance tolerated during [place] wall wipes; improved active range of motion in both shoulders - all planes except for left shoulder external rotation.    Nicolás Is progressing fairly well towards his goals.   Pt prognosis is Good.     Pt will continue to benefit from skilled outpatient physical therapy to address the deficits listed in the problem list box on initial evaluation, provide pt/family education and to maximize pt's level of independence in the home and community environment.     Pt's spiritual, cultural and educational needs considered and pt agreeable to plan of care and goals.     Anticipated barriers to physical therapy: severity of pain; range of motion restrictions    Goals:     Short Term Goals (STG) # weeks Goal Review Date Reviewed Status   1. The patient will begin a written home exercise program. 3 Initial 3/10/2022  NOT MET   2. Decrease patient's complaints of pain to 0/10 during performance of activities of daily living for independence of self care activities. 3 Initial 3/10/2022 MET    3. Patient to report an improvement in his ability to sleep from moderate difficulty to a little bit of difficulty. 3 Initial 3/10/2022 NOT MET          Long Term Goals (LTG) # weeks Goal Review Date Reviewed Status   1. The patient will be independent with a home exercise program for maintenace. 5 Initial 3/10/2022 NOT MET    2. Patient to improve bilateral shoulder abduction MMT 1/2 grade to demo strength gains from  therapeutic intervention. 5 Initial 3/10/2022  NOT MET   3. Patient to achieve full active range of motion of bilateral shoulders in all ranges. 5 Initial 3/10/2022  PART MET       PLAN     Continue to advance range of motion/strength training to patient's tolerance.      Omero Lee, PT

## 2022-04-01 ENCOUNTER — CLINICAL SUPPORT (OUTPATIENT)
Dept: REHABILITATION | Facility: HOSPITAL | Age: 72
End: 2022-04-01
Payer: MEDICARE

## 2022-04-01 DIAGNOSIS — M25.511 ACUTE PAIN OF BOTH SHOULDERS: ICD-10-CM

## 2022-04-01 DIAGNOSIS — C32.9 LARYNX NEOPLASM MALIGNANT: Primary | ICD-10-CM

## 2022-04-01 DIAGNOSIS — R29.898 BILATERAL ARM WEAKNESS: ICD-10-CM

## 2022-04-01 DIAGNOSIS — M25.512 ACUTE PAIN OF BOTH SHOULDERS: ICD-10-CM

## 2022-04-01 PROCEDURE — 97110 THERAPEUTIC EXERCISES: CPT | Mod: PN

## 2022-04-01 NOTE — PROGRESS NOTES
[facility]   Physical Therapy Treatment Note     Name: Cyrus Batres Jr.  Clinic Number: [id number]    Therapy Diagnosis:   Encounter Diagnoses   Name Primary?    Larynx neoplasm malignant Yes    Bilateral arm weakness     Acute pain of both shoulders      Physician: Jesse James MD    Visit Date: 4/1/2022    Physician Orders: PT Eval and Treat   Medical Diagnosis from Referral: larynx neoplasm malignant  Evaluation Date: 3/10/2022  Authorization Period Expiration: 08/01/2022  Plan of Care Expiration: 04/16/22  Visit # / Visits authorized: 4/ 20  (POC 5/11)     Time In: 1334   Time Out: 1415  Total Billable Time: 41 minutes     Precautions: recent laryngectomy      SUBJECTIVE     Pt reports: mild decrease in his pain levels.  He does not have a home exercise program.  Response to previous treatment: decreased pain  Functional change: none    Pain: 0/10  Location: bilateral shoulders        OBJECTIVE      n/a    Treatment     Nicolás received the treatments listed below:      therapeutic exercises to develop strength, endurance and ROM for 41 minutes including:     X 4'/4' UBE to promote flexibility prior to range of motion/strength training   X 10 each supine bilateral shoulder active assisted range of motion = flexion, abduction   X 15 each standing bilateral D1/D2 extension (yellow theraband)   X 15 each standing dumbbell therapeutic exercise to 90* (2#) = flexion, scaption   X 15 standing push up plus   X 15 seated passive pectoral stretch (3 second holds)      Patient Education and Home Exercises     Home Exercises Provided and Patient Education Provided     Education provided:   - proper therapeutic exercise technique    Written Home Exercises Provided: to be provided at future appointment.       ASSESSMENT     Patient was able to tolerate increased resistance during standing dumbbell therapeutic exercise to 90 degrees; remaining treatment performed with minimal  difficulty.    Nicolás Is progressing fairly well towards his goals.   Pt prognosis is Good.     Pt will continue to benefit from skilled outpatient physical therapy to address the deficits listed in the problem list box on initial evaluation, provide pt/family education and to maximize pt's level of independence in the home and community environment.     Pt's spiritual, cultural and educational needs considered and pt agreeable to plan of care and goals.     Anticipated barriers to physical therapy: severity of pain; range of motion restrictions    Goals:     Short Term Goals (STG) # weeks Goal Review Date Reviewed Status   1. The patient will begin a written home exercise program. 3 Initial 3/10/2022  NOT MET   2. Decrease patient's complaints of pain to 0/10 during performance of activities of daily living for independence of self care activities. 3 Initial 3/10/2022 MET    3. Patient to report an improvement in his ability to sleep from moderate difficulty to a little bit of difficulty. 3 Initial 3/10/2022 NOT MET          Long Term Goals (LTG) # weeks Goal Review Date Reviewed Status   1. The patient will be independent with a home exercise program for maintenace. 5 Initial 3/10/2022 NOT MET    2. Patient to improve bilateral shoulder abduction MMT 1/2 grade to demo strength gains from therapeutic intervention. 5 Initial 3/10/2022  NOT MET   3. Patient to achieve full active range of motion of bilateral shoulders in all ranges. 5 Initial 3/10/2022  PART MET       PLAN     Continue to advance range of motion/strength training to patient's tolerance.      Omero Lee, PT

## 2022-04-05 ENCOUNTER — CLINICAL SUPPORT (OUTPATIENT)
Dept: REHABILITATION | Facility: HOSPITAL | Age: 72
End: 2022-04-05
Payer: MEDICARE

## 2022-04-05 DIAGNOSIS — R29.898 BILATERAL ARM WEAKNESS: Primary | ICD-10-CM

## 2022-04-05 DIAGNOSIS — M25.512 ACUTE PAIN OF BOTH SHOULDERS: ICD-10-CM

## 2022-04-05 DIAGNOSIS — M25.511 ACUTE PAIN OF BOTH SHOULDERS: ICD-10-CM

## 2022-04-05 PROCEDURE — 97110 THERAPEUTIC EXERCISES: CPT | Mod: PN,CQ

## 2022-04-05 NOTE — PROGRESS NOTES
[facility]   Physical Therapy Treatment Note     Name: Cyrus Batres Jr.  Clinic Number: [id number]    Therapy Diagnosis:   Encounter Diagnoses   Name Primary?    Bilateral arm weakness Yes    Acute pain of both shoulders      Physician: Jesse James MD    Visit Date: 4/5/2022    Physician Orders: PT Eval and Treat   Medical Diagnosis from Referral: larynx neoplasm malignant  Evaluation Date: 3/10/2022  Authorization Period Expiration: 08/01/2022  Plan of Care Expiration: 04/16/22  Visit # / Visits authorized: 4/ 20  (POC 5/11)    Precautions: recent laryngectomy       PTA Visit #: 1/5     Time In: 1345  Time Out: 1530  Total Billable Time: 45 minutes    SUBJECTIVE     Pt reports: Left shoulder soreness, indicating he moved the lawn for 2 hours and is sore..  He had not received home exercise program.  Response to previous treatment: no problems stated  Functional change: ongoing    Pain: 2/10  Location: left shoulder      OBJECTIVE     Objective Measures updated at progress report unless specified.     Treatment     Nicolás received the treatments listed below:      therapeutic exercises to develop strength, endurance, range of motion, flexibility and posture for 45 minutes including:    UPPER BIKE EXERCISER 4 minutes forward and 4 minutes backward    Supine Cane: Bilateral Push out 20 times    Bilateral shoulder flexion 20 times    Bilateral horizontal abduction and adduction 20 times    Stand cane: Abduction 20 times Right Left     Bilateral internal rotation 20 times behind back    Bilateral flexion with 2# cane 20 times    Bilateral anterior chest wall stretch 2x30 seconds in doorway    Stand D1 and D2 flexion with yellow Theraband 2 sets of 10 Right Left     Stand with back supported for scaption with 2# 20 times Right Left     Rows with Blue Theraband 20 times    Bilateral Retro shoulder rolls 20 times    Patient Education and Home Exercises     Home Exercises Provided and  Patient Education Provided     Education provided:   - Patient provided with verbal and demonstrative instruction for all activities performed in today's session.    Written Home Exercises Provided: yes. Exercises were reviewed and Nicolás was able to demonstrate them prior to the end of the session.  Nicolás demonstrated good  understanding of the education provided. See EMR under Patient Instructions for exercises provided during therapy sessions    ASSESSMENT     Cyrus provided good participation and effort during today's session with treatment focused on Bilateral shoulder range of motion with strengthening. Cyrus tolerated activities without complaints and needed occasional verbal cues to decrease speed and increase range.    Nicolás Is progressing towards his goals.   Pt prognosis is Good.     Pt will continue to benefit from skilled outpatient physical therapy to address the deficits listed in the problem list box on initial evaluation, provide pt/family education and to maximize pt's level of independence in the home and community environment.     Pt's spiritual, cultural and educational needs considered and pt agreeable to plan of care and goals.     Anticipated barriers to physical therapy: severity of pain; range of motion restrictions    Goals:     Short Term Goals (STG) # weeks Goal Review Date Reviewed Status   1. The patient will begin a written home exercise program. 3 Initial 3/10/2022  Progressing   2. Decrease patient's complaints of pain to 0/10 during performance of activities of daily living for independence of self care activities. 3 Initial 3/10/2022 MET    3. Patient to report an improvement in his ability to sleep from moderate difficulty to a little bit of difficulty. 3 Initial 3/10/2022 Ongoing          Long Term Goals (LTG) # weeks Goal Review Date Reviewed Status   1. The patient will be independent with a home exercise program for maintenace. 5 Initial 3/10/2022 Ongoing    2. Patient  to improve bilateral shoulder abduction MMT 1/2 grade to demo strength gains from therapeutic intervention. 5 Initial 3/10/2022  Ongoing   3. Patient to achieve full active range of motion of bilateral shoulders in all ranges. 5 Initial 3/10/2022  Progressing        PLAN     Continue with plan of care to increase shoulder range of motion as patient tolerates.    Ena Smith, PTA

## 2022-04-08 ENCOUNTER — CLINICAL SUPPORT (OUTPATIENT)
Dept: REHABILITATION | Facility: HOSPITAL | Age: 72
End: 2022-04-08
Payer: MEDICARE

## 2022-04-08 DIAGNOSIS — R29.898 BILATERAL ARM WEAKNESS: Primary | ICD-10-CM

## 2022-04-08 DIAGNOSIS — M25.512 ACUTE PAIN OF BOTH SHOULDERS: ICD-10-CM

## 2022-04-08 DIAGNOSIS — M25.511 ACUTE PAIN OF BOTH SHOULDERS: ICD-10-CM

## 2022-04-08 PROCEDURE — 97110 THERAPEUTIC EXERCISES: CPT | Mod: PN,CQ

## 2022-04-08 NOTE — PROGRESS NOTES
[facility]   Physical Therapy Treatment Note     Name: Cyrus Batres Jr.  Clinic Number: [id number]    Therapy Diagnosis:   Encounter Diagnoses   Name Primary?    Bilateral arm weakness Yes    Acute pain of both shoulders      Physician: Jesse James MD    Visit Date: 4/8/2022    Physician Orders: PT Eval and Treat   Medical Diagnosis from Referral: larynx neoplasm malignant  Evaluation Date: 3/10/2022  Authorization Period Expiration: 08/01/2022  Plan of Care Expiration: 04/16/22  Visit # / Visits authorized: 6/20  (POC 7/11)    Precautions: recent laryngectomy     PTA Visit #: 2/5     Time In: 1530  Time Out: 1410  Total Billable Time: 40 minutes    SUBJECTIVE     Pt reports: Doing well today, without complaints, reports sleeping without difficulties.  He was  Compliant with home exercise program.  Response to previous treatment: no problems stated  Functional change: ongoing    Pain: not rated/10  Location: left shoulder      OBJECTIVE     Objective Measures updated at progress report unless specified.     Treatment     Nicolás received the treatments listed below:      therapeutic exercises to develop strength, endurance, range of motion, flexibility and posture for 40 minutes including:    Upper Bike Exerciser 4 minutes forward and 4 minutes backward    Supine 3# Cane:Bilateral Push out 20 times       Bilateral shoulder flexion 20 times       Bilateral horizontal abduction and adduction 20 times    Stand 3# cane:Abduction 20 times Right Left     Bilateral internal rotation 20 times behind back      Bilateral anterior chest wall stretch 2x30 seconds in doorway    Stand with back supported for scaption with 2# 20 times Right Left     Rows with Blue Theraband 2 sets of 20    Bilateral Retro shoulder rolls 20 times    Wall ladder 10 times Right Left with 5 second hold to Level 27    Patient Education and Home Exercises     Home Exercises Provided and Patient Education Provided      Education provided:   - Patient provided with verbal and demonstrative instruction for all activities performed in today's session.    Written Home Exercises Provided: Patient instructed to cont prior HEP. Exercises were reviewed and Nicolás was able to demonstrate them prior to the end of the session.  Nicolás demonstrated good  understanding of the education provided. See EMR under Patient Instructions for exercises provided during therapy sessions    ASSESSMENT     Cyrus provided good participation and effort during today's session with treatment focused on Bilateral shoulder range of motion with strengthening. Cyrus did well with all activities with cues for proper form.    Nicolás Is progressing towards his goals.   Pt prognosis is Good.     Pt will continue to benefit from skilled outpatient physical therapy to address the deficits listed in the problem list box on initial evaluation, provide pt/family education and to maximize pt's level of independence in the home and community environment.     Pt's spiritual, cultural and educational needs considered and pt agreeable to plan of care and goals.     Anticipated barriers to physical therapy: severity of pain; range of motion restrictions    Goals:     Short Term Goals (STG) # weeks Goal Review Date Reviewed Status   1. The patient will begin a written home exercise program. 3 Initial 3/10/2022  Progressing   2. Decrease patient's complaints of pain to 0/10 during performance of activities of daily living for independence of self care activities. 3 Initial 3/10/2022 MET    3. Patient to report an improvement in his ability to sleep from moderate difficulty to a little bit of difficulty. 3 Initial 3/10/2022 progressing          Long Term Goals (LTG) # weeks Goal Review Date Reviewed Status   1. The patient will be independent with a home exercise program for maintenace. 5 Initial 3/10/2022 Ongoing    2. Patient to improve bilateral shoulder abduction MMT  1/2 grade to demo strength gains from therapeutic intervention. 5 Initial 3/10/2022  Ongoing   3. Patient to achieve full active range of motion of bilateral shoulders in all ranges. 5 Initial 3/10/2022  Progressing        PLAN     Continue with plan of care to increase shoulder range of motion as patient tolerates.    Ena Smith, PTA

## 2022-04-12 ENCOUNTER — CLINICAL SUPPORT (OUTPATIENT)
Dept: REHABILITATION | Facility: HOSPITAL | Age: 72
End: 2022-04-12
Payer: MEDICARE

## 2022-04-12 DIAGNOSIS — M25.511 ACUTE PAIN OF BOTH SHOULDERS: ICD-10-CM

## 2022-04-12 DIAGNOSIS — C32.9 LARYNX NEOPLASM MALIGNANT: Primary | ICD-10-CM

## 2022-04-12 DIAGNOSIS — R29.898 BILATERAL ARM WEAKNESS: ICD-10-CM

## 2022-04-12 DIAGNOSIS — M25.512 ACUTE PAIN OF BOTH SHOULDERS: ICD-10-CM

## 2022-04-12 PROCEDURE — 97112 NEUROMUSCULAR REEDUCATION: CPT | Mod: PN

## 2022-04-12 NOTE — PROGRESS NOTES
[facility]   Physical Therapy Treatment Note     Name: Cyrus Batres Jr.  Clinic Number: [id number]    Therapy Diagnosis:   Encounter Diagnoses   Name Primary?    Larynx neoplasm malignant Yes    Bilateral arm weakness     Acute pain of both shoulders      Physician: Jesse James MD    Visit Date: 4/12/2022    Physician Orders: PT Eval and Treat   Medical Diagnosis from Referral: larynx neoplasm malignant  Evaluation Date: 3/10/2022  Authorization Period Expiration: 08/01/2022  Plan of Care Expiration: 04/16/22  Visit # / Visits authorized: 7/ 20  (POC 8/11)     Time In: 1343  Time Out: 1433  Total Billable Time: 50 minutes     Precautions: recent laryngectomy      SUBJECTIVE     Pt reports: no real complaints of pain.    He was compliant with home exercise program.  Response to previous treatment: decreased pain  Functional change: none    Pain: 0/10  Location: bilateral shoulders        OBJECTIVE      n/a    Treatment     Nicolás received the treatments listed below:      therapeutic exercises to develop strength, endurance and ROM for 50 minutes including:     X 4'/4' UBE to promote flexibility prior to range of motion/strength training   X 15 each supine bilateral shoulder active assisted range of motion = flexion, abduction, internal rotation, external rotation    X 15 supine bilateral serratus punches (2#)              X 15 supine bilateral con/ecc shoulder flexion/extension (2#)   X 20 each seated scapular therapeutic exercise = retro rolls, shrugs, pinches    X 20 each standing therabar shoulder therapeutic exercise (3#) = extension, internal rotation, flexion   X 20 each standing scapular theraband training (blue theraband) = rows, lat pull downs, upper trap rows              X 20 each standing bilateral shoulder theraband training (green theraband) = external rotation, internal rotation       Patient Education and Home Exercises     Home Exercises Provided and Patient  Education Provided     Education provided:   - proper therapeutic exercise technique  - continue home exercise program     Written Home Exercises Provided: Patient instructed to cont prior HEP.       ASSESSMENT     Patient was able to tolerate increased resistance during supine dumbbell therapeutic exercise; increased repetitions tolerated during active assisted range of motion and scapular therapeutic exercise; increased repetitions also performed during standing theraband exercises.    Nicolás Is progressing fairly well towards his goals.   Pt prognosis is Good.     Pt will continue to benefit from skilled outpatient physical therapy to address the deficits listed in the problem list box on initial evaluation, provide pt/family education and to maximize pt's level of independence in the home and community environment.     Pt's spiritual, cultural and educational needs considered and pt agreeable to plan of care and goals.     Anticipated barriers to physical therapy: severity of pain; range of motion restrictions    Goals:     Short Term Goals (STG) # weeks Goal Review Date Reviewed Status   1. The patient will begin a written home exercise program. 3 Initial 3/10/2022  MET   2. Decrease patient's complaints of pain to 0/10 during performance of activities of daily living for independence of self care activities. 3 Initial 3/10/2022 MET    3. Patient to report an improvement in his ability to sleep from moderate difficulty to a little bit of difficulty. 3 Initial 3/10/2022 NOT MET          Long Term Goals (LTG) # weeks Goal Review Date Reviewed Status   1. The patient will be independent with a home exercise program for maintenace. 5 Initial 3/10/2022 NOT MET    2. Patient to improve bilateral shoulder abduction MMT 1/2 grade to demo strength gains from therapeutic intervention. 5 Initial 3/10/2022  NOT MET   3. Patient to achieve full active range of motion of bilateral shoulders in all ranges. 5 Initial 3/10/2022   PART MET       PLAN     Continue to advance range of motion/strength training to patient's tolerance.      Omero Lee, PT

## 2022-04-13 ENCOUNTER — TELEPHONE (OUTPATIENT)
Dept: CARDIOLOGY | Facility: CLINIC | Age: 72
End: 2022-04-13
Payer: MEDICARE

## 2022-04-14 ENCOUNTER — PATIENT MESSAGE (OUTPATIENT)
Dept: OTOLARYNGOLOGY | Facility: CLINIC | Age: 72
End: 2022-04-14
Payer: MEDICARE

## 2022-04-14 ENCOUNTER — CLINICAL SUPPORT (OUTPATIENT)
Dept: REHABILITATION | Facility: HOSPITAL | Age: 72
End: 2022-04-14
Payer: MEDICARE

## 2022-04-14 DIAGNOSIS — M25.511 ACUTE PAIN OF BOTH SHOULDERS: ICD-10-CM

## 2022-04-14 DIAGNOSIS — M25.512 ACUTE PAIN OF BOTH SHOULDERS: ICD-10-CM

## 2022-04-14 DIAGNOSIS — C32.9 LARYNX NEOPLASM MALIGNANT: Primary | ICD-10-CM

## 2022-04-14 DIAGNOSIS — R29.898 BILATERAL ARM WEAKNESS: ICD-10-CM

## 2022-04-14 PROCEDURE — 97110 THERAPEUTIC EXERCISES: CPT | Mod: PN

## 2022-04-14 NOTE — PROGRESS NOTES
[facility]   Physical Therapy Discharge Note     Name: Cyrus Bartes Jr.  Clinic Number: [id number]    Therapy Diagnosis:   Encounter Diagnoses   Name Primary?    Larynx neoplasm malignant Yes    Bilateral arm weakness     Acute pain of both shoulders      Physician: Jesse James MD    Visit Date: 4/14/2022    Physician Orders: PT Eval and Treat   Medical Diagnosis from Referral: larynx neoplasm malignant  Evaluation Date: 3/10/2022    Date of Last visit: 04/14/22  Total Visits Received: 9     Time In: 1345  Time Out: 1435  Total Billable Time: 50 minutes     Precautions: recent laryngectomy      SUBJECTIVE     Pt reports: no real complaints of pain.    He was compliant with home exercise program.  Response to previous treatment: decreased pain  Functional change: none    Pain: 0/10  Location: bilateral shoulders        OBJECTIVE     Range of Motion/Strength:      Shoulder Left Right Pain/Dysfunction with Movement   AROM         flexion  145  141     abduction  139  133     external rotation   67  72     internal rotation   72  77        UE MMT Left Right Pain/Dysfunction with Movement   Shoulder Flexion 4/5. 4/5.     Shoulder Abduction 4-/5. 4-/5.     Elbow Flexion 4+/5. 4+/5.     Elbow Extension 4+/5. 4+/5.      5/5. 5/5.        Other: Upper Extremity Functional Index = 76/80 (5% disability)      Treatment     Nicolás received the treatments listed below:      therapeutic exercises to develop strength, endurance and ROM for 40 minutes including:     X 4'/4' UBE (level 1.5) to promote flexibility prior to range of motion/strength training   X 20 supine bilateral serratus punches (2#)              X 15 supine bilateral con/ecc shoulder flexion/extension (2#)   X 20 each standing therabar shoulder therapeutic exercise (3#) = extension, internal rotation, flexion, abduction    X 20 each standing scapular theraband training (blue theraband) = rows, lat pull downs, upper trap  rows              X 20 each standing bilateral shoulder theraband training (green theraband) = external rotation, internal rotation    Provided patient with shoulder wand and scapular theraband home exercise program - instructed to perform twice a day       Patient Education and Home Exercises     Home Exercises Provided and Patient Education Provided     Education provided:   - proper therapeutic exercise technique  - continue home exercise program     Written Home Exercises Provided: Patient instructed to cont new and prior HEP.       ASSESSMENT     Patient was able to tolerate increased repetitions during supine dumbbell serratus punches; increased resistance tolerated on the UBE; able to demonstrate competence with home exercise program.    Nicolás has progressed fairly well towards his goals.     Pt's spiritual, cultural and educational needs considered and pt agreeable to plan of care and goals.     Goals:     Short Term Goals (STG) # weeks Goal Review Date Reviewed Status   1. The patient will begin a written home exercise program. 3 Initial 3/10/2022  MET   2. Decrease patient's complaints of pain to 0/10 during performance of activities of daily living for independence of self care activities. 3 Initial 3/10/2022 MET    3. Patient to report an improvement in his ability to sleep from moderate difficulty to a little bit of difficulty. 3 Initial 3/10/2022 MET          Long Term Goals (LTG) # weeks Goal Review Date Reviewed Status   1. The patient will be independent with a home exercise program for maintenace. 5 Initial 3/10/2022 MET    2. Patient to improve bilateral shoulder abduction MMT 1/2 grade to demo strength gains from therapeutic intervention. 5 Initial 3/10/2022  MET   3. Patient to achieve full active range of motion of bilateral shoulders in all ranges. 5 Initial 3/10/2022  PART MET     Discharge reason: Patient has reached the maximum rehab potential for the present time.      PLAN     This  patient is discharged from Physical Therapy.      Omero Lee, PT

## 2022-04-19 ENCOUNTER — OFFICE VISIT (OUTPATIENT)
Dept: OTOLARYNGOLOGY | Facility: CLINIC | Age: 72
End: 2022-04-19
Payer: MEDICARE

## 2022-04-19 VITALS
HEART RATE: 71 BPM | TEMPERATURE: 98 F | DIASTOLIC BLOOD PRESSURE: 78 MMHG | WEIGHT: 147.94 LBS | BODY MASS INDEX: 23.88 KG/M2 | SYSTOLIC BLOOD PRESSURE: 142 MMHG

## 2022-04-19 DIAGNOSIS — C32.9 LARYNX CANCER: Primary | ICD-10-CM

## 2022-04-19 PROCEDURE — 3288F PR FALLS RISK ASSESSMENT DOCUMENTED: ICD-10-PCS | Mod: CPTII,S$GLB,, | Performed by: OTOLARYNGOLOGY

## 2022-04-19 PROCEDURE — 3077F PR MOST RECENT SYSTOLIC BLOOD PRESSURE >= 140 MM HG: ICD-10-PCS | Mod: CPTII,S$GLB,, | Performed by: OTOLARYNGOLOGY

## 2022-04-19 PROCEDURE — 3066F PR DOCUMENTATION OF TREATMENT FOR NEPHROPATHY: ICD-10-PCS | Mod: CPTII,S$GLB,, | Performed by: OTOLARYNGOLOGY

## 2022-04-19 PROCEDURE — 1126F PR PAIN SEVERITY QUANTIFIED, NO PAIN PRESENT: ICD-10-PCS | Mod: CPTII,S$GLB,, | Performed by: OTOLARYNGOLOGY

## 2022-04-19 PROCEDURE — 1159F MED LIST DOCD IN RCRD: CPT | Mod: CPTII,S$GLB,, | Performed by: OTOLARYNGOLOGY

## 2022-04-19 PROCEDURE — 4010F PR ACE/ARB THEARPY RXD/TAKEN: ICD-10-PCS | Mod: CPTII,S$GLB,, | Performed by: OTOLARYNGOLOGY

## 2022-04-19 PROCEDURE — 99499 UNLISTED E&M SERVICE: CPT | Mod: S$GLB,,, | Performed by: OTOLARYNGOLOGY

## 2022-04-19 PROCEDURE — 1126F AMNT PAIN NOTED NONE PRSNT: CPT | Mod: CPTII,S$GLB,, | Performed by: OTOLARYNGOLOGY

## 2022-04-19 PROCEDURE — 3044F PR MOST RECENT HEMOGLOBIN A1C LEVEL <7.0%: ICD-10-PCS | Mod: CPTII,S$GLB,, | Performed by: OTOLARYNGOLOGY

## 2022-04-19 PROCEDURE — 3044F HG A1C LEVEL LT 7.0%: CPT | Mod: CPTII,S$GLB,, | Performed by: OTOLARYNGOLOGY

## 2022-04-19 PROCEDURE — 3066F NEPHROPATHY DOC TX: CPT | Mod: CPTII,S$GLB,, | Performed by: OTOLARYNGOLOGY

## 2022-04-19 PROCEDURE — 99213 OFFICE O/P EST LOW 20 MIN: CPT | Mod: S$GLB,,, | Performed by: OTOLARYNGOLOGY

## 2022-04-19 PROCEDURE — 3008F PR BODY MASS INDEX (BMI) DOCUMENTED: ICD-10-PCS | Mod: CPTII,S$GLB,, | Performed by: OTOLARYNGOLOGY

## 2022-04-19 PROCEDURE — 1160F RVW MEDS BY RX/DR IN RCRD: CPT | Mod: CPTII,S$GLB,, | Performed by: OTOLARYNGOLOGY

## 2022-04-19 PROCEDURE — 99999 PR PBB SHADOW E&M-EST. PATIENT-LVL III: ICD-10-PCS | Mod: PBBFAC,,, | Performed by: OTOLARYNGOLOGY

## 2022-04-19 PROCEDURE — 1101F PT FALLS ASSESS-DOCD LE1/YR: CPT | Mod: CPTII,S$GLB,, | Performed by: OTOLARYNGOLOGY

## 2022-04-19 PROCEDURE — 99999 PR PBB SHADOW E&M-EST. PATIENT-LVL III: CPT | Mod: PBBFAC,,, | Performed by: OTOLARYNGOLOGY

## 2022-04-19 PROCEDURE — 3078F DIAST BP <80 MM HG: CPT | Mod: CPTII,S$GLB,, | Performed by: OTOLARYNGOLOGY

## 2022-04-19 PROCEDURE — 3288F FALL RISK ASSESSMENT DOCD: CPT | Mod: CPTII,S$GLB,, | Performed by: OTOLARYNGOLOGY

## 2022-04-19 PROCEDURE — 3008F BODY MASS INDEX DOCD: CPT | Mod: CPTII,S$GLB,, | Performed by: OTOLARYNGOLOGY

## 2022-04-19 PROCEDURE — 99499 RISK ADDL DX/OHS AUDIT: ICD-10-PCS | Mod: S$GLB,,, | Performed by: OTOLARYNGOLOGY

## 2022-04-19 PROCEDURE — 1160F PR REVIEW ALL MEDS BY PRESCRIBER/CLIN PHARMACIST DOCUMENTED: ICD-10-PCS | Mod: CPTII,S$GLB,, | Performed by: OTOLARYNGOLOGY

## 2022-04-19 PROCEDURE — 99213 PR OFFICE/OUTPT VISIT, EST, LEVL III, 20-29 MIN: ICD-10-PCS | Mod: S$GLB,,, | Performed by: OTOLARYNGOLOGY

## 2022-04-19 PROCEDURE — 3060F POS MICROALBUMINURIA REV: CPT | Mod: CPTII,S$GLB,, | Performed by: OTOLARYNGOLOGY

## 2022-04-19 PROCEDURE — 1101F PR PT FALLS ASSESS DOC 0-1 FALLS W/OUT INJ PAST YR: ICD-10-PCS | Mod: CPTII,S$GLB,, | Performed by: OTOLARYNGOLOGY

## 2022-04-19 PROCEDURE — 3078F PR MOST RECENT DIASTOLIC BLOOD PRESSURE < 80 MM HG: ICD-10-PCS | Mod: CPTII,S$GLB,, | Performed by: OTOLARYNGOLOGY

## 2022-04-19 PROCEDURE — 3077F SYST BP >= 140 MM HG: CPT | Mod: CPTII,S$GLB,, | Performed by: OTOLARYNGOLOGY

## 2022-04-19 PROCEDURE — 1159F PR MEDICATION LIST DOCUMENTED IN MEDICAL RECORD: ICD-10-PCS | Mod: CPTII,S$GLB,, | Performed by: OTOLARYNGOLOGY

## 2022-04-19 PROCEDURE — 3060F PR POS MICROALBUMINURIA RESULT DOCUMENTED/REVIEW: ICD-10-PCS | Mod: CPTII,S$GLB,, | Performed by: OTOLARYNGOLOGY

## 2022-04-19 PROCEDURE — 4010F ACE/ARB THERAPY RXD/TAKEN: CPT | Mod: CPTII,S$GLB,, | Performed by: OTOLARYNGOLOGY

## 2022-04-19 NOTE — PROGRESS NOTES
Chief Complaint   Patient presents with    Follow-up     Oncology History   Larynx cancer   8/4/2020 Initial Diagnosis    Larynx neoplasm malignant     8/6/2020 Tumor Conference    His case was discussed at the Multidisciplinary Head and Neck Team Planning Meeting.    Representatives from Medical Oncology, Radiation Oncology, Head and Neck Surgical Oncology, Psychosocial Oncology, and Speech and Language Pathology discussed the case with the following recommendations:    1) definitive radiation              8/6/2020 Cancer Staged    Staging form: Larynx - Glottis, AJCC 8th Edition  - Clinical stage from 8/6/2020: Stage II (cT2, cN0, cM0)     8/6/2020 Cancer Staged    Cancer Staging  Larynx neoplasm malignant  Staging form: Larynx - Glottis, AJCC 8th Edition  - Clinical stage from 8/6/2020: Stage II (cT2, cN0, cM0) - Signed by Fariha Muñoz NP on 8/6/2020 12/16/2021 Tumor Conference    His case was discussed at the Multidisciplinary Head and Neck Team Planning Meeting.    Representatives from Medical Oncology, Radiation Oncology, Head and Neck Surgical Oncology, Psychosocial Oncology, and Speech and Language Pathology discussed the case with the following recommendations:    1) TL  2) oncology referral  3) psychology referral            12/27/2021 Surgery    1. DL And tracheostomy  2. PEG     1/6/2022 Surgery    1. Diagnostic direct laryngoscopy  2. Bilateral modified neck dissection of levels 2 through 4  3. Total laryngectomy  4. Total thyroidectomy with bilateral paratracheal/upper mediastinal neck dissection  5. Autotransplantation of left inferior parathyroid into left sternocleidomastoid muscle   6. Left anterolateral thigh free flap for closure of total laryngectomy neck skin defect  7. Advancement flap for closure of left thigh donor site advanced area was 25 x 10 cm     1/20/2022 Cancer Staged    Staging form: Larynx - Glottis, AJCC 8th Edition  - Pathologic stage from 1/20/2022: Stage LOU (pT4a,  pN0, cM0)           HPI   71 y.o. male  returns for a post op visit. He has been doing well since his last visit. He is tolerating a soft diet.  He reports some difficulty with dry foods such as rice and bread. He is interested in pursuing an insufflation test to assess for TEP candidacy.     Review of Systems   Constitutional: Negative for fatigue and unexpected weight change.   HENT: Per HPI.  Eyes: Negative for visual disturbance.   Respiratory: Negative for shortness of breath, hemoptysis   Cardiovascular: Negative for chest pain and palpitations.   Musculoskeletal: Negative for decreased ROM, back pain.   Skin: Negative for rash, sunburn, itching.   Neurological: Negative for dizziness and seizures.   Hematological: Negative for adenopathy. Does not bruise/bleed easily.   Endocrine: Negative for rapid weight loss/weight gain, heat/cold intolerance.     Past Medical History   Patient Active Problem List   Diagnosis    Melanocytic nevus    Body mass index (BMI) of 29.0-29.9 in adult    Benign hypertension    Overweight    Paroxysmal atrial fibrillation    Type 2 diabetes mellitus with diabetic arthropathy, with long-term current use of insulin    Fatty liver disease, nonalcoholic    False positive serological test for hepatitis C    Hyperlipidemia    Type 2 diabetes mellitus with hyperglycemia, without long-term current use of insulin    Gastroesophageal reflux disease without esophagitis    Type 2 diabetes mellitus with hyperglycemia    Vitamin B12 deficiency    Hyperuricemia    Hypovitaminosis D    Proteinuria    On enteral nutrition    Larynx cancer    Dehydration    NSVT (nonsustained ventricular tachycardia)    Adverse effect of adrenal cortical steroids, sequela    S/P laryngectomy    Hypocalcemia    Aphonia    Dysphagia, pharyngoesophageal    Acute pain of both shoulders    Bilateral arm weakness           Past Surgical History   Past Surgical History:   Procedure Laterality Date     DIRECT LARYNGOBRONCHOSCOPY N/A 12/27/2021    Procedure: LARYNGOSCOPY, DIRECT, WITH BRONCHOSCOPY;  Surgeon: Flex Espinosa MD;  Location: [facility] OR 2ND FLR;  Service: ENT;  Laterality: N/A;    DISSECTION OF NECK Bilateral 1/6/2022    Procedure: DISSECTION, NECK;  Surgeon: Jesse James MD;  Location: [facility] OR [facility] FLR;  Service: ENT;  Laterality: Bilateral;    FLAP PROCEDURE Right 1/6/2022    Procedure: CREATION, FREE FLAP;  Surgeon: Alise Hart MD;  Location: [facility] OR [facility] FLR;  Service: ENT;  Laterality: Right;  Ischemic start 1351  Ischemic stop 1502    LARYNGECTOMY N/A 1/6/2022    Procedure: LARYNGECTOMY;  Surgeon: Jesse James MD;  [facility];  Service: ENT;  Laterality: N/A;    LARYNGOSCOPY N/A 8/4/2020    Procedure: Suspension microlaryngoscopy with biopsy, possible KTP laser treatment/excision;  Surgeon: Stew Noel MD;  [facility];  Service: ENT;  Laterality: N/A;  Microscope, telescopes, tower, microinstruments, KTP laser, rep conf# [id number] IC 7/28.    LARYNGOSCOPY N/A 3/16/2021    Procedure: Suspension microlaryngoscopy with excision of lesion, possible CO2 laser;  Surgeon: Stew Noel MD;  [facility];  Service: ENT;  Laterality: N/A;  Microscope, telescopes, tower, microinstruments, CO2 laser, rep conf# [id number] IC 3/4.    LARYNGOSCOPY N/A 4/1/2021    Procedure: Suspension microlaryngoscopy with KTP laser excision of lesion;  Surgeon: Stew Noel MD;  [facility];  Service: ENT;  Laterality: N/A;  Microscope, telescopes, tower, microinstruments, 70 degree scope, vocal fold , KTP laser, rep conf# [id number] BC    LARYNGOSCOPY N/A 12/9/2021    Procedure: Suspension microlaryngoscopy with biopsy;  Surgeon: Stew Noel MD;  Location: [facility] OR [facility] FLR;  Service: ENT;  Laterality: N/A;  Microscope, telescopes, tower, microinstruments    LARYNGOSCOPY N/A 1/6/2022    Procedure: LARYNGOSCOPY;   Surgeon: Jesse James MD;  Location: [facility] OR [facility] FLR;  Service: ENT;  Laterality: N/A;    MICROLARYNGOSCOPY N/A 3/17/2020    Procedure: MICROLARYNGOSCOPY;  Surgeon: Jung Xiao MD;  Location: [facility] OR;  Service: ENT;  Laterality: N/A;  Laser Microlaryngoscopy  NEED TO SCHEDULE LASER from [facility] [id number]    REIMPLANTATION OF PARATHYROID TISSUE N/A 1/6/2022    Procedure: REIMPLANTATION, PARATHYROID TISSUE;  Surgeon: Jesse James MD;  Location: [facility] OR [facility] FLR;  Service: ENT;  Laterality: N/A;    THYROIDECTOMY  1/6/2022    Procedure: THYROIDECTOMY;  Surgeon: Jesse James MD;  [facility];  Service: ENT;;    TRACHEOSTOMY N/A 12/27/2021    Procedure: CREATION, TRACHEOSTOMY;  Surgeon: Flex Espinosa MD;  [facility];  Service: ENT;  Laterality: N/A;         Family History   Family History   Problem Relation Age of Onset    Abnormal EKG Mother     Diabetes Father     Heart disease Father     Hypertension Father            Social History   .  Social History     Socioeconomic History    Marital status:      Spouse name: Janel Batres    Number of children: 2   Occupational History    Occupation: [employer]     Employer: [employer]   Tobacco Use    Smoking status: Never Smoker    Smokeless tobacco: Never Used   Substance and Sexual Activity    Alcohol use: Not Currently     Comment: occasional    Drug use: No    Sexual activity: Yes     Partners: Female   Social History Narrative    2 children from his 1st wife         Allergies   Review of patient's allergies indicates:   Allergen Reactions    Pollen extracts     Lovastatin Rash     Not confirmed but pt skeptical           Physical Exam     Vitals:    04/19/22 1306   BP: (!) 142/78   Pulse: 71   Temp: 98.1 °F (36.7 °C)         Body mass index is 23.88 kg/m².      General: AOx3, NAD   Respiratory:  Symmetric chest rise, normal effort  Oral Cavity:  Oral Tongue mobile, no lesions  noted. Hard Palate WNL. No buccal or FOM lesions.  Oropharynx:  No masses/lesions of the posterior pharyngeal wall. Tonsillar fossa without lesions. Soft palate without masses. Midline uvula.   Neck: Stoma widely patent. Skin paddle healthy with good color and turgor.Well-healed neck incisions.No LAD. Moderate post-op, post-treatment fibrosis.   Face: House Brackmann I bilaterally.    NP: No lesions of posterior wall  OP: No lesions of posterior wall or BOT. BOT is soft to palpation  Neopharynx: No lesions.       Assessment/Plan  Problem List Items Addressed This Visit        Oncology    Larynx cancer - Primary     ZAY.  RTC 6 weeks.  Esophagram to assess dysphagia.  Speech to perform insufflation test, assist with laryngectomy supplies.            Relevant Orders    FL Esophagram Complete

## 2022-04-19 NOTE — H&P (VIEW-ONLY)
Chief Complaint   Patient presents with    Follow-up     Oncology History   Larynx cancer   8/4/2020 Initial Diagnosis    Larynx neoplasm malignant     8/6/2020 Tumor Conference    His case was discussed at the Multidisciplinary Head and Neck Team Planning Meeting.    Representatives from Medical Oncology, Radiation Oncology, Head and Neck Surgical Oncology, Psychosocial Oncology, and Speech and Language Pathology discussed the case with the following recommendations:    1) definitive radiation              8/6/2020 Cancer Staged    Staging form: Larynx - Glottis, AJCC 8th Edition  - Clinical stage from 8/6/2020: Stage II (cT2, cN0, cM0)     8/6/2020 Cancer Staged    Cancer Staging  Larynx neoplasm malignant  Staging form: Larynx - Glottis, AJCC 8th Edition  - Clinical stage from 8/6/2020: Stage II (cT2, cN0, cM0) - Signed by Fariha Muñoz NP on 8/6/2020 12/16/2021 Tumor Conference    His case was discussed at the Multidisciplinary Head and Neck Team Planning Meeting.    Representatives from Medical Oncology, Radiation Oncology, Head and Neck Surgical Oncology, Psychosocial Oncology, and Speech and Language Pathology discussed the case with the following recommendations:    1) TL  2) oncology referral  3) psychology referral            12/27/2021 Surgery    1. DL And tracheostomy  2. PEG     1/6/2022 Surgery    1. Diagnostic direct laryngoscopy  2. Bilateral modified neck dissection of levels 2 through 4  3. Total laryngectomy  4. Total thyroidectomy with bilateral paratracheal/upper mediastinal neck dissection  5. Autotransplantation of left inferior parathyroid into left sternocleidomastoid muscle   6. Left anterolateral thigh free flap for closure of total laryngectomy neck skin defect  7. Advancement flap for closure of left thigh donor site advanced area was 25 x 10 cm     1/20/2022 Cancer Staged    Staging form: Larynx - Glottis, AJCC 8th Edition  - Pathologic stage from 1/20/2022: Stage LUO (pT4a,  pN0, cM0)           HPI   71 y.o. male  returns for a post op visit. He has been doing well since his last visit. He is tolerating a soft diet.  He reports some difficulty with dry foods such as rice and bread. He is interested in pursuing an insufflation test to assess for TEP candidacy.     Review of Systems   Constitutional: Negative for fatigue and unexpected weight change.   HENT: Per HPI.  Eyes: Negative for visual disturbance.   Respiratory: Negative for shortness of breath, hemoptysis   Cardiovascular: Negative for chest pain and palpitations.   Musculoskeletal: Negative for decreased ROM, back pain.   Skin: Negative for rash, sunburn, itching.   Neurological: Negative for dizziness and seizures.   Hematological: Negative for adenopathy. Does not bruise/bleed easily.   Endocrine: Negative for rapid weight loss/weight gain, heat/cold intolerance.     Past Medical History   Patient Active Problem List   Diagnosis    Melanocytic nevus    Body mass index (BMI) of 29.0-29.9 in adult    Benign hypertension    Overweight    Paroxysmal atrial fibrillation    Type 2 diabetes mellitus with diabetic arthropathy, with long-term current use of insulin    Fatty liver disease, nonalcoholic    False positive serological test for hepatitis C    Hyperlipidemia    Type 2 diabetes mellitus with hyperglycemia, without long-term current use of insulin    Gastroesophageal reflux disease without esophagitis    Type 2 diabetes mellitus with hyperglycemia    Vitamin B12 deficiency    Hyperuricemia    Hypovitaminosis D    Proteinuria    On enteral nutrition    Larynx cancer    Dehydration    NSVT (nonsustained ventricular tachycardia)    Adverse effect of adrenal cortical steroids, sequela    S/P laryngectomy    Hypocalcemia    Aphonia    Dysphagia, pharyngoesophageal    Acute pain of both shoulders    Bilateral arm weakness           Past Surgical History   Past Surgical History:   Procedure Laterality Date     DIRECT LARYNGOBRONCHOSCOPY N/A 12/27/2021    Procedure: LARYNGOSCOPY, DIRECT, WITH BRONCHOSCOPY;  Surgeon: Flex Espinosa MD;  Location: [facility] OR 2ND FLR;  Service: ENT;  Laterality: N/A;    DISSECTION OF NECK Bilateral 1/6/2022    Procedure: DISSECTION, NECK;  Surgeon: Jesse James MD;  Location: [facility] OR [facility] FLR;  Service: ENT;  Laterality: Bilateral;    FLAP PROCEDURE Right 1/6/2022    Procedure: CREATION, FREE FLAP;  Surgeon: Alise Hart MD;  Location: [facility] OR [facility] FLR;  Service: ENT;  Laterality: Right;  Ischemic start 1351  Ischemic stop 1502    LARYNGECTOMY N/A 1/6/2022    Procedure: LARYNGECTOMY;  Surgeon: Jesse James MD;  [facility];  Service: ENT;  Laterality: N/A;    LARYNGOSCOPY N/A 8/4/2020    Procedure: Suspension microlaryngoscopy with biopsy, possible KTP laser treatment/excision;  Surgeon: Stew Noel MD;  [facility];  Service: ENT;  Laterality: N/A;  Microscope, telescopes, tower, microinstruments, KTP laser, rep conf# [id number] IC 7/28.    LARYNGOSCOPY N/A 3/16/2021    Procedure: Suspension microlaryngoscopy with excision of lesion, possible CO2 laser;  Surgeon: Stew Noel MD;  [facility];  Service: ENT;  Laterality: N/A;  Microscope, telescopes, tower, microinstruments, CO2 laser, rep conf# [id number] IC 3/4.    LARYNGOSCOPY N/A 4/1/2021    Procedure: Suspension microlaryngoscopy with KTP laser excision of lesion;  Surgeon: Stew Noel MD;  [facility];  Service: ENT;  Laterality: N/A;  Microscope, telescopes, tower, microinstruments, 70 degree scope, vocal fold , KTP laser, rep conf# [id number] BC    LARYNGOSCOPY N/A 12/9/2021    Procedure: Suspension microlaryngoscopy with biopsy;  Surgeon: Stew Noel MD;  Location: [facility] OR [facility] FLR;  Service: ENT;  Laterality: N/A;  Microscope, telescopes, tower, microinstruments    LARYNGOSCOPY N/A 1/6/2022    Procedure: LARYNGOSCOPY;   Surgeon: Jesse James MD;  Location: [facility] OR [facility] FLR;  Service: ENT;  Laterality: N/A;    MICROLARYNGOSCOPY N/A 3/17/2020    Procedure: MICROLARYNGOSCOPY;  Surgeon: Jung Xiao MD;  Location: [facility] OR;  Service: ENT;  Laterality: N/A;  Laser Microlaryngoscopy  NEED TO SCHEDULE LASER from [facility] [id number]    REIMPLANTATION OF PARATHYROID TISSUE N/A 1/6/2022    Procedure: REIMPLANTATION, PARATHYROID TISSUE;  Surgeon: Jesse James MD;  Location: [facility] OR [facility] FLR;  Service: ENT;  Laterality: N/A;    THYROIDECTOMY  1/6/2022    Procedure: THYROIDECTOMY;  Surgeon: Jesse James MD;  [facility];  Service: ENT;;    TRACHEOSTOMY N/A 12/27/2021    Procedure: CREATION, TRACHEOSTOMY;  Surgeon: Flex Espinosa MD;  [facility];  Service: ENT;  Laterality: N/A;         Family History   Family History   Problem Relation Age of Onset    Abnormal EKG Mother     Diabetes Father     Heart disease Father     Hypertension Father            Social History   .  Social History     Socioeconomic History    Marital status:      Spouse name: Janel Batres    Number of children: 2   Occupational History    Occupation: [employer]     Employer: [employer]   Tobacco Use    Smoking status: Never Smoker    Smokeless tobacco: Never Used   Substance and Sexual Activity    Alcohol use: Not Currently     Comment: occasional    Drug use: No    Sexual activity: Yes     Partners: Female   Social History Narrative    2 children from his 1st wife         Allergies   Review of patient's allergies indicates:   Allergen Reactions    Pollen extracts     Lovastatin Rash     Not confirmed but pt skeptical           Physical Exam     Vitals:    04/19/22 1306   BP: (!) 142/78   Pulse: 71   Temp: 98.1 °F (36.7 °C)         Body mass index is 23.88 kg/m².      General: AOx3, NAD   Respiratory:  Symmetric chest rise, normal effort  Oral Cavity:  Oral Tongue mobile, no lesions  noted. Hard Palate WNL. No buccal or FOM lesions.  Oropharynx:  No masses/lesions of the posterior pharyngeal wall. Tonsillar fossa without lesions. Soft palate without masses. Midline uvula.   Neck: Stoma widely patent. Skin paddle healthy with good color and turgor.Well-healed neck incisions.No LAD. Moderate post-op, post-treatment fibrosis.   Face: House Brackmann I bilaterally.    NP: No lesions of posterior wall  OP: No lesions of posterior wall or BOT. BOT is soft to palpation  Neopharynx: No lesions.       Assessment/Plan  Problem List Items Addressed This Visit        Oncology    Larynx cancer - Primary     ZAY.  RTC 6 weeks.  Esophagram to assess dysphagia.  Speech to perform insufflation test, assist with laryngectomy supplies.            Relevant Orders    FL Esophagram Complete

## 2022-04-19 NOTE — ASSESSMENT & PLAN NOTE
ZAY.  RTC 6 weeks.  Esophagram to assess dysphagia.  Speech to perform insufflation test, assist with laryngectomy supplies.

## 2022-04-23 ENCOUNTER — HOSPITAL ENCOUNTER (EMERGENCY)
Facility: HOSPITAL | Age: 72
Discharge: HOME | End: 2022-04-24
Attending: EMERGENCY MEDICINE
Payer: MEDICARE

## 2022-04-23 DIAGNOSIS — R13.10 DYSPHAGIA, UNSPECIFIED TYPE: Primary | ICD-10-CM

## 2022-04-23 DIAGNOSIS — Z90.02 H/O LARYNGECTOMY: ICD-10-CM

## 2022-04-23 DIAGNOSIS — K14.8 TONGUE MASS: ICD-10-CM

## 2022-04-23 PROCEDURE — 85025 COMPLETE CBC W/AUTO DIFF WBC: CPT | Performed by: STUDENT IN AN ORGANIZED HEALTH CARE EDUCATION/TRAINING PROGRAM

## 2022-04-23 PROCEDURE — 99285 EMERGENCY DEPT VISIT HI MDM: CPT

## 2022-04-23 PROCEDURE — 86901 BLOOD TYPING SEROLOGIC RH(D): CPT | Performed by: STUDENT IN AN ORGANIZED HEALTH CARE EDUCATION/TRAINING PROGRAM

## 2022-04-23 PROCEDURE — 85730 THROMBOPLASTIN TIME PARTIAL: CPT | Performed by: STUDENT IN AN ORGANIZED HEALTH CARE EDUCATION/TRAINING PROGRAM

## 2022-04-23 PROCEDURE — 80053 COMPREHEN METABOLIC PANEL: CPT | Performed by: STUDENT IN AN ORGANIZED HEALTH CARE EDUCATION/TRAINING PROGRAM

## 2022-04-23 PROCEDURE — 85610 PROTHROMBIN TIME: CPT | Performed by: STUDENT IN AN ORGANIZED HEALTH CARE EDUCATION/TRAINING PROGRAM

## 2022-04-24 ENCOUNTER — PATIENT MESSAGE (OUTPATIENT)
Dept: OTOLARYNGOLOGY | Facility: CLINIC | Age: 72
End: 2022-04-24
Payer: MEDICARE

## 2022-04-24 VITALS
HEIGHT: 66 IN | TEMPERATURE: 98 F | OXYGEN SATURATION: 100 % | DIASTOLIC BLOOD PRESSURE: 72 MMHG | HEART RATE: 83 BPM | RESPIRATION RATE: 15 BRPM | WEIGHT: 145 LBS | SYSTOLIC BLOOD PRESSURE: 132 MMHG | BODY MASS INDEX: 23.3 KG/M2

## 2022-04-24 LAB
ABO + RH BLD: NORMAL
ALBUMIN SERPL BCP-MCNC: 3.9 G/DL (ref 3.5–5.2)
ALP SERPL-CCNC: 74 U/L (ref 55–135)
ALT SERPL W/O P-5'-P-CCNC: 15 U/L (ref 10–44)
ANION GAP SERPL CALC-SCNC: 9 MMOL/L (ref 8–16)
APTT PPP: 29.3 SEC (ref 23.3–35.1)
AST SERPL-CCNC: 19 U/L (ref 10–40)
BASOPHILS # BLD AUTO: 0.02 K/UL (ref 0–0.2)
BASOPHILS NFR BLD: 0.4 % (ref 0–1.9)
BILIRUB SERPL-MCNC: 0.8 MG/DL (ref 0.1–1)
BLD GP AB SCN CELLS X3 SERPL QL: NORMAL
BUN SERPL-MCNC: 18 MG/DL (ref 8–23)
CALCIUM SERPL-MCNC: 8.9 MG/DL (ref 8.7–10.5)
CHLORIDE SERPL-SCNC: 103 MMOL/L (ref 95–110)
CO2 SERPL-SCNC: 23 MMOL/L (ref 23–29)
CREAT SERPL-MCNC: 0.9 MG/DL (ref 0.5–1.4)
CREAT SERPL-MCNC: 1 MG/DL (ref 0.5–1.4)
DIFFERENTIAL METHOD: ABNORMAL
EOSINOPHIL # BLD AUTO: 0.2 K/UL (ref 0–0.5)
EOSINOPHIL NFR BLD: 3 % (ref 0–8)
ERYTHROCYTE [DISTWIDTH] IN BLOOD BY AUTOMATED COUNT: 14.9 % (ref 11.5–14.5)
EST. GFR  (AFRICAN AMERICAN): >60 ML/MIN/1.73 M^2
EST. GFR  (NON AFRICAN AMERICAN): >60 ML/MIN/1.73 M^2
GLUCOSE SERPL-MCNC: 162 MG/DL (ref 70–110)
HCT VFR BLD AUTO: 35 % (ref 40–54)
HGB BLD-MCNC: 11 G/DL (ref 14–18)
IMM GRANULOCYTES # BLD AUTO: 0.05 K/UL (ref 0–0.04)
IMM GRANULOCYTES NFR BLD AUTO: 0.9 % (ref 0–0.5)
INR PPP: 1.2
LYMPHOCYTES # BLD AUTO: 0.9 K/UL (ref 1–4.8)
LYMPHOCYTES NFR BLD: 16.9 % (ref 18–48)
MCH RBC QN AUTO: 26.4 PG (ref 27–31)
MCHC RBC AUTO-ENTMCNC: 31.4 G/DL (ref 32–36)
MCV RBC AUTO: 84 FL (ref 82–98)
MONOCYTES # BLD AUTO: 0.5 K/UL (ref 0.3–1)
MONOCYTES NFR BLD: 8.6 % (ref 4–15)
NEUTROPHILS # BLD AUTO: 3.7 K/UL (ref 1.8–7.7)
NEUTROPHILS NFR BLD: 70.2 % (ref 38–73)
NRBC BLD-RTO: 0 /100 WBC
PLATELET # BLD AUTO: 182 K/UL (ref 150–450)
PMV BLD AUTO: 11.5 FL (ref 9.2–12.9)
POTASSIUM SERPL-SCNC: 3.9 MMOL/L (ref 3.5–5.1)
PROT SERPL-MCNC: 7 G/DL (ref 6–8.4)
PROTHROMBIN TIME: 14.3 SEC (ref 11.4–13.7)
RBC # BLD AUTO: 4.16 M/UL (ref 4.6–6.2)
SAMPLE: NORMAL
SODIUM SERPL-SCNC: 135 MMOL/L (ref 136–145)
WBC # BLD AUTO: 5.32 K/UL (ref 3.9–12.7)

## 2022-04-24 PROCEDURE — 25500020 PHARM REV CODE 255: Performed by: EMERGENCY MEDICINE

## 2022-04-24 RX ADMIN — IOHEXOL 100 ML: 350 INJECTION, SOLUTION INTRAVENOUS at 12:04

## 2022-04-24 NOTE — DISCHARGE INSTRUCTIONS
-CT of your neck today shows developing mass at the left base of the tongue enhancing 2.1 cm region  -Please call your ENT doctor Jesse James on Monday morning to set up and outpatient appointment for follow-up.

## 2022-04-24 NOTE — ED PROVIDER NOTES
Encounter Date: 4/23/2022       History     Chief Complaint   Patient presents with    bleeding in throat     Post laryngectomy in January. Bleeding in back of throat starting Thursday afternoon     HPI   71-year-old male with history of hypertension, diabetes, laryngeal cancer status post radiation and laryngectomy presents for evaluation of bleeding in the back of his throat for 2 episodes since Thursday.  Patient reports woke up with bright red blood in his mouth initially on Thursday and another episode today.  He also endorses of dysphagia with difficulty swallowing food and is pending outpatient esophagram.  Patient denies any blood coming out from stoma or difficulty breathing.  He denies recent illness including fever, vomiting, chest pain or shortness of breath.    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     Past Surgical History:   Procedure Laterality Date    DIRECT LARYNGOBRONCHOSCOPY N/A 12/27/2021    Procedure: LARYNGOSCOPY, DIRECT, WITH BRONCHOSCOPY;  Surgeon: Flex Espinosa MD;  Location: [facility] OR [address] [place] [address];  Service: ENT;  Laterality: N/A;    DISSECTION OF NECK Bilateral 1/6/2022    Procedure: DISSECTION, NECK;  Surgeon: Jesse James MD;  Location: [facility] OR [address] [place] [address];  Service: ENT;  Laterality: Bilateral;    FLAP PROCEDURE Right 1/6/2022    Procedure: CREATION, FREE FLAP;  Surgeon: Alise Hart MD;  Location: [facility] OR [address] [place] [address];  Service: ENT;  Laterality: Right;  Ischemic start 1351  Ischemic stop 1502    LARYNGECTOMY N/A 1/6/2022    Procedure: LARYNGECTOMY;  Surgeon: Jesse James MD;  Location: [facility] OR [address] [place] [address];  Service: ENT;  Laterality: N/A;    LARYNGOSCOPY N/A 8/4/2020     Procedure: Suspension microlaryngoscopy with biopsy, possible KTP laser treatment/excision;  Surgeon: Stew Noel MD;  [facility];  Service: ENT;  Laterality: N/A;  Microscope, telescopes, tower, microinstruments, KTP laser, rep conf# [id number] IC 7/28.    LARYNGOSCOPY N/A 3/16/2021    Procedure: Suspension microlaryngoscopy with excision of lesion, possible CO2 laser;  Surgeon: Stew Noel MD;  [facility];  Service: ENT;  Laterality: N/A;  Microscope, telescopes, tower, microinstruments, CO2 laser, rep conf# [id number] IC 3/4.    LARYNGOSCOPY N/A 4/1/2021    Procedure: Suspension microlaryngoscopy with KTP laser excision of lesion;  Surgeon: Stew Noel MD;  Location: [address];  Service: ENT;  Laterality: N/A;  Microscope, telescopes, tower, microinstruments, 70 degree scope, vocal fold , KTP laser, rep conf# [id number] BC    LARYNGOSCOPY N/A 12/9/2021    Procedure: Suspension microlaryngoscopy with biopsy;  Surgeon: Stew Noel MD;  Location: [facility] OR [address] [place] [address];  Service: ENT;  Laterality: N/A;  Microscope, telescopes, tower, microinstruments    LARYNGOSCOPY N/A 1/6/2022    Procedure: LARYNGOSCOPY;  Surgeon: Jesse James MD;  Location: [facility] OR [address] [place] [address];  Service: ENT;  Laterality: N/A;    MICROLARYNGOSCOPY N/A 3/17/2020    Procedure: MICROLARYNGOSCOPY;  Surgeon: Jung Xiao MD;  Location: [facility];  Service: ENT;  Laterality: N/A;  Laser Microlaryngoscopy  NEED TO SCHEDULE LASER from [facility] [phone]    REIMPLANTATION OF PARATHYROID TISSUE N/A 1/6/2022    Procedure: REIMPLANTATION, PARATHYROID TISSUE;  Surgeon: Jesse James MD;  [facility];  Service: ENT;  Laterality: N/A;    THYROIDECTOMY  1/6/2022    Procedure: THYROIDECTOMY;  Surgeon: Jesse James MD;  Location: [facility] OR 2ND FLR;  Service: ENT;;    TRACHEOSTOMY N/A 12/27/2021    Procedure: CREATION, TRACHEOSTOMY;  Surgeon: Flex MARTINEZ  MD Jesús;  Location: [facility] OR [address] [place] [address];  Service: ENT;  Laterality: N/A;     Family History   Problem Relation Age of Onset    Abnormal EKG Mother     Diabetes Father     Heart disease Father     Hypertension Father      Social History     Tobacco Use    Smoking status: Never Smoker    Smokeless tobacco: Never Used   Substance Use Topics    Alcohol use: Not Currently     Comment: occasional    Drug use: No     Review of Systems   Constitutional: Negative for diaphoresis and fever.   HENT: Positive for drooling and trouble swallowing. Negative for congestion, rhinorrhea, sore throat and voice change.         Dysphagia, bleeding in mouth   Eyes: Negative for pain and visual disturbance.   Respiratory: Negative for cough, shortness of breath and wheezing.    Cardiovascular: Negative for chest pain and palpitations.   Gastrointestinal: Negative for abdominal pain, blood in stool, diarrhea, nausea and vomiting.   Genitourinary: Negative for dysuria, flank pain and frequency.   Musculoskeletal: Negative for back pain and gait problem.   Skin: Negative for rash and wound.   Neurological: Negative for seizures, syncope, light-headedness, numbness and headaches.   Psychiatric/Behavioral: Negative for agitation and behavioral problems.       Physical Exam     Initial Vitals [04/23/22 2149]   BP Pulse Resp Temp SpO2   (!) 143/82 94 16 98.4 °F (36.9 °C) 100 %      MAP       --         Physical Exam    Nursing note and vitals reviewed.  Constitutional: He appears well-developed. He is not diaphoretic. No distress.   HENT:   Head: Normocephalic and atraumatic.   Right Ear: External ear normal.   Left Ear: External ear normal.   Nose: Nose normal.   Mouth/Throat: No oropharyngeal exudate.   Blood tinged secretions in mouth, no active bleeding   Eyes: Conjunctivae and EOM are normal. Pupils are equal, round, and reactive to light. Right eye exhibits no discharge. Left eye exhibits no discharge. No scleral icterus.   Neck:  Neck supple.   Stoma site dry clean intact, no blood   Normal range of motion.  Cardiovascular: Normal rate, regular rhythm, normal heart sounds and intact distal pulses.   No murmur heard.  Pulmonary/Chest: Breath sounds normal. No stridor. No respiratory distress. He has no wheezes. He has no rhonchi. He has no rales.   Abdominal: Abdomen is soft. Bowel sounds are normal. He exhibits no distension. There is no abdominal tenderness. There is no rebound and no guarding.   Musculoskeletal:         General: Normal range of motion.      Cervical back: Normal range of motion and neck supple.     Neurological: He is alert and oriented to person, place, and time. GCS score is 15. GCS eye subscore is 4. GCS verbal subscore is 5. GCS motor subscore is 6.   Skin: Skin is warm and dry. Capillary refill takes less than 2 seconds.   Psychiatric: He has a normal mood and affect.         ED Course   Procedures  Labs Reviewed   CBC W/ AUTO DIFFERENTIAL - Abnormal; Notable for the following components:       Result Value    RBC 4.16 (*)     Hemoglobin 11.0 (*)     Hematocrit 35.0 (*)     MCH 26.4 (*)     MCHC 31.4 (*)     RDW 14.9 (*)     Immature Granulocytes 0.9 (*)     Immature Grans (Abs) 0.05 (*)     Lymph # 0.9 (*)     Lymph % 16.9 (*)     All other components within normal limits   COMPREHENSIVE METABOLIC PANEL - Abnormal; Notable for the following components:    Sodium 135 (*)     Glucose 162 (*)     All other components within normal limits   PROTIME-INR - Abnormal; Notable for the following components:    PT 14.3 (*)     All other components within normal limits   APTT   TYPE & SCREEN   ISTAT CREATININE          Imaging Results          CT Soft Tissue Neck With Contrast (Final result)  Result time 04/24/22 01:29:34    Final result by Daryl Campbell MD (04/24/22 01:29:34)                 Narrative:    CT neck with contrast    Comparison:  CT neck dated December 24, 2021    Additional pertinent history:  Lymphadenopathy  with bleeding from the mouth with history of laryngectomy    TECHNIQUE:  Multiple axial images were obtained from the mid portion of the brain through the superior mediastinum with  IV contrast.  Coronal and sagittal reformatted images were obtained off the axial source data.  One of the following dose optimization techniques was utilized in the performance of this exam: Automated exposure control; adjustment of the mA and/or kV according to the patient's size; or use of an iterative  reconstruction technique.  Specific details can be referenced in the facility's radiology CT exam operational policy.    CONTRAST:  100 mL of Omnipaque 350    FINDINGS:    Visualized portions of the brain parenchyma:Negative    Parotid glands/submandibular glands/thyroid:  Patient status post thyroidectomy. Enhancement and decreased size of both submandibular glands likely related to previous radiation therapy. Normal appearance of the parotid glands.    Orbits:  Negative    Paranasal sinuses:  Mild mucosal thickening involving both maxillary sinuses.    Parapharyngeal spaces: Negative    Nasopharynx/oropharynx/hypopharynx:  Negative    Tonsillar pillars:  Negative    Oral tongue/tongue base:  At the base of the tongue on the left there are findings of a heterogeneously enhancing mass measuring 2.1 cm highly concerning for a neoplastic process.    True and false cords:  Patient status post laryngectomy which has been performed in the interim. Soft tissue stranding in the lower neck likely related to a previous flat reconstruction. No enhancement or lymphadenopathy within the lower neck.    Lymph node assessment:Negative    Surrounding soft tissues:  Negative    Vasculature:  Negative    Osseous structures:  Negative    Lung apices:  Scarring involving the lung apices bilaterally likely related to previous radiation.    IMPRESSION:  1. Postoperative and radiation changes involving the lower neck.  2. Findings highly concerning for a  developing mass at the left base of the tongue enhancing 2.1 cm region. Direct visualization in this region would be of benefit.    Electronically signed by:  Daryl Campbell MD  4/24/2022 1:29 AM CDT Workstation: [id number]                               Medications   iohexoL (OMNIPAQUE 350) injection 100 mL (100 mLs Intravenous Given 4/24/22 0033)           APC / Resident Notes:     Cyrus Batres Jr. is a 71 y.o. male with laryngeal cancer status post laryngectomy p/w dysphagia and bleeding in the back of his mouth. Vital signs stable, afebrile, no hypoxia.     Ddx includes but not limited to esophageal ulcers, anemia, malignancy, epistaxis, GI bleed. Unlikely tracheo Innominate Artery Fistula given no blood coming from tracheostomy site with history status post laryngectomy.    Plan for labs, coags, CT neck with contrast    Alessandro Green  EM/PEDS PGY5  6:03 AM        ED Course as of 04/24/22 0610   Sun Apr 24, 2022   0606 Hemoglobin(!): 11.0 [TT]   0606 Glucose(!): 162 [TT]   0606 CT Soft Tissue Neck With Contrast  Findings highly concerning for a developing mass at the left base of the tongue enhancing 2.1 cm region [TT]   0608 Spoke with ENT on-call in [facility] Dr. Pulliam patient can be follow-up on Monday with Dr James in ENT clinic.  Strict return precaution provided.  Condition is stable to discharge. [TT]      ED Course User Index  [TT] Jayleen Green MD             Clinical Impression:   Final diagnoses:  [Z90.02] H/O laryngectomy  [R13.10] Dysphagia, unspecified type (Primary)  [K14.8] Tongue mass          ED Disposition Condition    Discharge Stable        ED Prescriptions     None        Follow-up Information     Follow up With Specialties Details Why Contact Info Additional Information    [facility] - Emergency Dept Emergency Medicine Go to  If symptoms worsen, uncontrolled bleeding or difficulty breathing [address] [facility] [address] [phone] 1st floor     Jesse James MD Otolaryngology Call on 4/25/2022 Follow up your ER visit [address]  [place] [address]  [phone]              Jayleen Green MD  Resident  04/24/22 0610

## 2022-04-25 DIAGNOSIS — C32.9 LARYNX CANCER: Primary | ICD-10-CM

## 2022-04-25 RX ORDER — LIDOCAINE HYDROCHLORIDE 10 MG/ML
1 INJECTION, SOLUTION EPIDURAL; INFILTRATION; INTRACAUDAL; PERINEURAL ONCE
Status: CANCELLED | OUTPATIENT
Start: 2022-04-25 | End: 2022-04-25

## 2022-04-25 RX ORDER — SODIUM CHLORIDE 0.9 % (FLUSH) 0.9 %
10 SYRINGE (ML) INJECTION
Status: CANCELLED | OUTPATIENT
Start: 2022-04-25

## 2022-04-26 ENCOUNTER — TELEPHONE (OUTPATIENT)
Dept: OTOLARYNGOLOGY | Facility: CLINIC | Age: 72
End: 2022-04-26
Payer: MEDICARE

## 2022-04-27 ENCOUNTER — ANESTHESIA EVENT (OUTPATIENT)
Dept: SURGERY | Facility: HOSPITAL | Age: 72
End: 2022-04-27
Payer: MEDICARE

## 2022-04-27 ENCOUNTER — ANESTHESIA (OUTPATIENT)
Dept: SURGERY | Facility: HOSPITAL | Age: 72
End: 2022-04-27
Payer: MEDICARE

## 2022-04-27 ENCOUNTER — HOSPITAL ENCOUNTER (OUTPATIENT)
Facility: HOSPITAL | Age: 72
Discharge: HOME | End: 2022-04-27
Attending: OTOLARYNGOLOGY | Admitting: OTOLARYNGOLOGY
Payer: MEDICARE

## 2022-04-27 VITALS
BODY MASS INDEX: 23.3 KG/M2 | RESPIRATION RATE: 20 BRPM | HEART RATE: 78 BPM | TEMPERATURE: 98 F | SYSTOLIC BLOOD PRESSURE: 126 MMHG | WEIGHT: 145 LBS | DIASTOLIC BLOOD PRESSURE: 64 MMHG | OXYGEN SATURATION: 100 % | HEIGHT: 66 IN

## 2022-04-27 DIAGNOSIS — C32.9 LARYNX CANCER: ICD-10-CM

## 2022-04-27 DIAGNOSIS — Z90.02 S/P LARYNGECTOMY: Primary | ICD-10-CM

## 2022-04-27 LAB
POCT GLUCOSE: 146 MG/DL (ref 70–110)
POCT GLUCOSE: 175 MG/DL (ref 70–110)

## 2022-04-27 PROCEDURE — 88305 TISSUE EXAM BY PATHOLOGIST: ICD-10-PCS | Mod: 26,,, | Performed by: PATHOLOGY

## 2022-04-27 PROCEDURE — 88342 IMHCHEM/IMCYTCHM 1ST ANTB: CPT | Mod: 26,,, | Performed by: PATHOLOGY

## 2022-04-27 PROCEDURE — D9220A PRA ANESTHESIA: Mod: ANES,,, | Performed by: ANESTHESIOLOGY

## 2022-04-27 PROCEDURE — 88342 CHG IMMUNOCYTOCHEMISTRY: ICD-10-PCS | Mod: 26,,, | Performed by: PATHOLOGY

## 2022-04-27 PROCEDURE — 37000009 HC ANESTHESIA EA ADD 15 MINS: Performed by: OTOLARYNGOLOGY

## 2022-04-27 PROCEDURE — 31535 PR LARYNGOSCOPY,DIRCT,OP,BIOPSY: ICD-10-PCS | Mod: ,,, | Performed by: OTOLARYNGOLOGY

## 2022-04-27 PROCEDURE — 37000008 HC ANESTHESIA 1ST 15 MINUTES: Performed by: OTOLARYNGOLOGY

## 2022-04-27 PROCEDURE — 36000709 HC OR TIME LEV III EA ADD 15 MIN: Performed by: OTOLARYNGOLOGY

## 2022-04-27 PROCEDURE — D9220A PRA ANESTHESIA: ICD-10-PCS | Mod: ANES,,, | Performed by: ANESTHESIOLOGY

## 2022-04-27 PROCEDURE — 31535 LARYNGOSCOPY W/BIOPSY: CPT | Mod: ,,, | Performed by: OTOLARYNGOLOGY

## 2022-04-27 PROCEDURE — 36000708 HC OR TIME LEV III 1ST 15 MIN: Performed by: OTOLARYNGOLOGY

## 2022-04-27 PROCEDURE — 82962 GLUCOSE BLOOD TEST: CPT | Performed by: OTOLARYNGOLOGY

## 2022-04-27 PROCEDURE — 25000003 PHARM REV CODE 250: Performed by: NURSE ANESTHETIST, CERTIFIED REGISTERED

## 2022-04-27 PROCEDURE — D9220A PRA ANESTHESIA: ICD-10-PCS | Mod: CRNA,,, | Performed by: NURSE ANESTHETIST, CERTIFIED REGISTERED

## 2022-04-27 PROCEDURE — 71000044 HC DOSC ROUTINE RECOVERY FIRST HOUR: Performed by: OTOLARYNGOLOGY

## 2022-04-27 PROCEDURE — 71000015 HC POSTOP RECOV 1ST HR: Performed by: OTOLARYNGOLOGY

## 2022-04-27 PROCEDURE — 88305 TISSUE EXAM BY PATHOLOGIST: CPT | Performed by: PATHOLOGY

## 2022-04-27 PROCEDURE — D9220A PRA ANESTHESIA: Mod: CRNA,,, | Performed by: NURSE ANESTHETIST, CERTIFIED REGISTERED

## 2022-04-27 PROCEDURE — 88309 TISSUE EXAM BY PATHOLOGIST: CPT | Performed by: PATHOLOGY

## 2022-04-27 PROCEDURE — 63600175 PHARM REV CODE 636 W HCPCS: Performed by: NURSE ANESTHETIST, CERTIFIED REGISTERED

## 2022-04-27 PROCEDURE — 88305 TISSUE EXAM BY PATHOLOGIST: CPT | Mod: 26,,, | Performed by: PATHOLOGY

## 2022-04-27 PROCEDURE — 88342 IMHCHEM/IMCYTCHM 1ST ANTB: CPT | Performed by: PATHOLOGY

## 2022-04-27 RX ORDER — HYDROCODONE BITARTRATE AND ACETAMINOPHEN 7.5; 325 MG/15ML; MG/15ML
15 SOLUTION ORAL EVERY 6 HOURS PRN
Qty: 240 ML | Refills: 0 | Status: SHIPPED | OUTPATIENT
Start: 2022-04-27 | End: 2022-05-01

## 2022-04-27 RX ORDER — DEXAMETHASONE SODIUM PHOSPHATE 4 MG/ML
INJECTION, SOLUTION INTRA-ARTICULAR; INTRALESIONAL; INTRAMUSCULAR; INTRAVENOUS; SOFT TISSUE
Status: DISCONTINUED | OUTPATIENT
Start: 2022-04-27 | End: 2022-04-27

## 2022-04-27 RX ORDER — SODIUM CHLORIDE 0.9 % (FLUSH) 0.9 %
10 SYRINGE (ML) INJECTION
Status: DISCONTINUED | OUTPATIENT
Start: 2022-04-27 | End: 2022-04-27 | Stop reason: HOSPADM

## 2022-04-27 RX ORDER — ROCURONIUM BROMIDE 10 MG/ML
INJECTION, SOLUTION INTRAVENOUS
Status: DISCONTINUED | OUTPATIENT
Start: 2022-04-27 | End: 2022-04-27

## 2022-04-27 RX ORDER — LIDOCAINE HYDROCHLORIDE 10 MG/ML
INJECTION, SOLUTION INTRAVENOUS
Status: DISCONTINUED | OUTPATIENT
Start: 2022-04-27 | End: 2022-04-27

## 2022-04-27 RX ORDER — HYDROMORPHONE HYDROCHLORIDE 1 MG/ML
0.2 INJECTION, SOLUTION INTRAMUSCULAR; INTRAVENOUS; SUBCUTANEOUS EVERY 5 MIN PRN
Status: DISCONTINUED | OUTPATIENT
Start: 2022-04-27 | End: 2022-04-27 | Stop reason: HOSPADM

## 2022-04-27 RX ORDER — LIDOCAINE HYDROCHLORIDE 40 MG/ML
SOLUTION TOPICAL
Status: DISCONTINUED | OUTPATIENT
Start: 2022-04-27 | End: 2022-04-27

## 2022-04-27 RX ORDER — LIDOCAINE HYDROCHLORIDE 10 MG/ML
1 INJECTION, SOLUTION EPIDURAL; INFILTRATION; INTRACAUDAL; PERINEURAL ONCE
Status: DISCONTINUED | OUTPATIENT
Start: 2022-04-27 | End: 2022-04-27 | Stop reason: HOSPADM

## 2022-04-27 RX ORDER — PROCHLORPERAZINE EDISYLATE 5 MG/ML
5 INJECTION INTRAMUSCULAR; INTRAVENOUS EVERY 30 MIN PRN
Status: DISCONTINUED | OUTPATIENT
Start: 2022-04-27 | End: 2022-04-27 | Stop reason: HOSPADM

## 2022-04-27 RX ORDER — ONDANSETRON 2 MG/ML
INJECTION INTRAMUSCULAR; INTRAVENOUS
Status: DISCONTINUED | OUTPATIENT
Start: 2022-04-27 | End: 2022-04-27

## 2022-04-27 RX ORDER — PROPOFOL 10 MG/ML
VIAL (ML) INTRAVENOUS
Status: DISCONTINUED | OUTPATIENT
Start: 2022-04-27 | End: 2022-04-27

## 2022-04-27 RX ADMIN — DEXAMETHASONE SODIUM PHOSPHATE 4 MG: 4 INJECTION INTRA-ARTICULAR; INTRALESIONAL; INTRAMUSCULAR; INTRAVENOUS; SOFT TISSUE at 02:04

## 2022-04-27 RX ADMIN — LIDOCAINE HYDROCHLORIDE 3 MG: 40 SOLUTION TOPICAL at 02:04

## 2022-04-27 RX ADMIN — ROCURONIUM BROMIDE 25 MG: 10 INJECTION, SOLUTION INTRAVENOUS at 02:04

## 2022-04-27 RX ADMIN — SUGAMMADEX 200 MG: 100 INJECTION, SOLUTION INTRAVENOUS at 02:04

## 2022-04-27 RX ADMIN — PROPOFOL 150 MG: 10 INJECTION, EMULSION INTRAVENOUS at 02:04

## 2022-04-27 RX ADMIN — LIDOCAINE HYDROCHLORIDE 60 MG: 10 INJECTION, SOLUTION INTRAVENOUS at 02:04

## 2022-04-27 RX ADMIN — ONDANSETRON 4 MG: 2 INJECTION INTRAMUSCULAR; INTRAVENOUS at 02:04

## 2022-04-27 RX ADMIN — SODIUM CHLORIDE: 9 INJECTION, SOLUTION INTRAVENOUS at 01:04

## 2022-04-27 NOTE — TRANSFER OF CARE
"Anesthesia Transfer of Care Note    Patient: Cyrus Batres Jr.    Procedure(s) Performed: Procedure(s) (LRB):  LARYNGOSCOPY WITH BIOPSY (N/A)    Patient location: PACU    Anesthesia Type: general    Transport from OR: Transported from OR on 6-10 L/min O2 by face mask with adequate spontaneous ventilation    Post pain: adequate analgesia    Post assessment: no apparent anesthetic complications and tolerated procedure well    Post vital signs: stable    Nausea/Vomiting: no nausea/vomiting    Complications: none    Transfer of care protocol was followed      Last vitals:   Visit Vitals  /63 (BP Location: Left arm, Patient Position: Lying)   Pulse 68   Temp 36.8 °C (98.2 °F) (Oral)   Resp 16   Ht 5' 6" (1.676 m)   Wt 65.8 kg (145 lb)   SpO2 100%   BMI 23.40 kg/m²     "

## 2022-04-27 NOTE — PLAN OF CARE
Patient is stable and ready for discharge. Instructions and prescription given to patient and family. Questions answered. Patient tolerating po liquids with no difficulty. Patient has no pain or states it is a tolerable level for them. Anesthesia consent and surgical consent in chart.

## 2022-04-27 NOTE — ANESTHESIA PREPROCEDURE EVALUATION
04/27/2022  Cyrus Batres Jr. is a 71 y.o., male.    [facility]  Anesthesia Pre-Operative Evaluation       Patient Name: Cyrus Batres Jr.  YOB: 1950  MRN: [id number]  CSN: [id number]      Code Status: Full Code   Date of Procedure: 4/27/2022  Anesthesia: General Procedure: Procedure(s) (LRB):  LARYNGOSCOPY (N/A)  Pre-Operative Diagnosis: Larynx cancer [C32.9]  Proceduralist: Surgeon(s) and Role:     * Jesse James MD - Primary        SUBJECTIVE:   Cyrus Batres Jr. is a 71 y.o. male who  has a past medical history of Allergy, Atrial fibrillation, Chronic anticoagulation, Diabetes mellitus, type 2, Hypertension, and Larynx neoplasm malignant (8/4/2020).  He is s/p laryngectomy     he has a current medication list which includes the following long-term medication(s): diltiazem, levothyroxine, losartan, aspirin, and ciclopirox.   ALLERGIES:     Review of patient's allergies indicates:   Allergen Reactions    Pollen extracts     Lovastatin Rash     Not confirmed but pt skeptical     LDA:      Lines/Drains/Airways     Drain  Duration                Gastrostomy/Enterostomy 12/27/21 1152 Percutaneous endoscopic gastrostomy (PEG)  days          Airway  Duration                Laryngectomy (Do Not Remove) 01/06/22 1655 Laryngectomy tube 110 days          Peripheral Intravenous Line  Duration                Peripheral IV - Single Lumen 04/23/22 2352 20 G Right Forearm 3 days         Peripheral IV - Single Lumen 04/27/22 1212 20 G Posterior;Right Hand <1 day              MEDICATIONS:     Antibiotics (From admission, onward)            Start     Stop Route Frequency Ordered    04/27/22 1155  ampicillin-sulbactam 3 g in sodium chloride 0.9 % 100 mL IVPB (ready to mix system)         -- IV On Call Procedure 04/27/22 1155        VTE Risk Mitigation (From admission, onward)          Ordered     IP VTE HIGH RISK PATIENT  Once         04/27/22 1155     Place sequential compression device  Until discontinued         04/27/22 1155     Place ROBYN hose  Until discontinued         04/27/22 1155              Current Facility-Administered Medications   Medication Dose Route Frequency Provider Last Rate Last Admin    ampicillin-sulbactam 3 g in sodium chloride 0.9 % 100 mL IVPB (ready to mix system)  3 g Intravenous On Call Procedure Jesse James MD        LIDOcaine (PF) 10 mg/ml (1%) injection 10 mg  1 mL Intradermal Once Jesse James MD        sodium chloride 0.9% flush 10 mL  10 mL Intravenous PRN Jesse James MD         Facility-Administered Medications Ordered in Other Encounters   Medication Dose Route Frequency Provider Last Rate Last Admin    lactated ringers infusion   Intravenous Continuous Torsten Hilton MD              History:   There are no hospital problems to display for this patient.    Surgical History:    has a past surgical history that includes Microlaryngoscopy (N/A, 3/17/2020); Laryngoscopy (N/A, 8/4/2020); Laryngoscopy (N/A, 3/16/2021); Laryngoscopy (N/A, 4/1/2021); Laryngoscopy (N/A, 12/9/2021); Tracheostomy (N/A, 12/27/2021); Direct laryngobronchoscopy (N/A, 12/27/2021); Laryngectomy (N/A, 1/6/2022); Dissection of neck (Bilateral, 1/6/2022); Thyroidectomy (1/6/2022); Laryngoscopy (N/A, 1/6/2022); Reimplantation of parathyroid tissue (N/A, 1/6/2022); and Flap procedure (Right, 1/6/2022).   Social History:    reports that he has never smoked. He has never used smokeless tobacco. He reports previous alcohol use. He reports that he does not use drugs.     OBJECTIVE:     Vital Signs (Most Recent):  Temp: 36.8 °C (98.2 °F) (04/27/22 1218)  Pulse: 68 (04/27/22 1218)  Resp: 16 (04/27/22 1218)  BP: 111/63 (04/27/22 1218)  SpO2: 100 % (04/27/22 1218) Vital Signs Range (Last 24H):  Temp:  [36.8 °C (98.2 °F)]   Pulse:  [68]   Resp:  [16]   BP:  (111)/(63)   SpO2:  [100 %]        Body mass index is 23.4 kg/m².   Wt Readings from Last 4 Encounters:   04/27/22 65.8 kg (145 lb)   04/23/22 65.8 kg (145 lb)   04/19/22 67.1 kg (147 lb 14.9 oz)   03/14/22 66.2 kg (145 lb 15.1 oz)     Significant Labs:  Lab Results   Component Value Date    WBC 5.32 04/23/2022    HGB 11.0 (L) 04/23/2022    HCT 35.0 (L) 04/23/2022     04/23/2022     (L) 04/23/2022    K 3.9 04/23/2022     04/23/2022    CREATININE 1.0 04/23/2022    BUN 18 04/23/2022    CO2 23 04/23/2022     (H) 04/23/2022    CALCIUM 8.9 04/23/2022    MG 1.8 01/14/2022    PHOS 4.0 01/14/2022    ALKPHOS 74 04/23/2022    ALT 15 04/23/2022    AST 19 04/23/2022    ALBUMIN 3.9 04/23/2022    INR 1.2 04/23/2022    APTT 29.3 04/23/2022    HGBA1C 5.4 02/14/2022    MICROALBUR 112.2 10/18/2018    CPK 48 12/24/2021    CPKMB 0.7 12/24/2021    TROPONINI <0.030 12/24/2021     No LMP for male patient.  Recent Results (from the past 72 hour(s))   POCT glucose    Collection Time: 04/27/22 12:07 PM   Result Value Ref Range    POCT Glucose 175 (H) 70 - 110 mg/dL       EKG:   Results for orders placed or performed in visit on 12/25/21   EKG 12-lead    Collection Time: 12/25/21  1:49 AM    Narrative    Test Reason :     Vent. Rate : 099 BPM     Atrial Rate : 099 BPM     P-R Int : 148 ms          QRS Dur : 080 ms      QT Int : 324 ms       P-R-T Axes : 052 019 015 degrees     QTc Int : 415 ms    Normal sinus rhythm  Normal ECG  When compared with ECG of 24-DEC-2021 19:19,  T wave inversion now evident in Inferior leads  Confirmed by VIC MEZA MD ([id number]) on 12/25/2021 11:02:36 AM    Referred By: CANDIDA TY           Confirmed By:VIC MEZA MD         ASSESSMENT/PLAN:       Pre-op Assessment    I have reviewed the Patient Summary Reports.     I have reviewed the Nursing Notes. I have reviewed the NPO Status.   I have reviewed the Medications.     Review of Systems      Physical Exam  General: Well  nourished, Cooperative and Alert    Airway:  Pre-Existing Airway: Tracheal Stoma    Chest/Lungs:  Normal Respiratory Rate    Heart:  Rate: Normal  Rhythm: Regular Rhythm        Anesthesia Plan  Type of Anesthesia, risks & benefits discussed:    Anesthesia Type: Gen ETT  Intra-op Monitoring Plan: Standard ASA Monitors  Post Op Pain Control Plan: IV/PO Opioids PRN and multimodal analgesia  Induction:  IV  Airway Plan: Via Tracheostomy  Informed Consent: Informed consent signed with the Patient and all parties understand the risks and agree with anesthesia plan.  All questions answered.   ASA Score: 3  Day of Surgery Review of History & Physical: H&P Update referred to the surgeon/provider.  Anesthesia Plan Notes: Patient s/p laryngectomy.  Will discuss with surgeon but airway management per through trachea stoma.          Ready For Surgery From Anesthesia Perspective.     .

## 2022-04-27 NOTE — BRIEF OP NOTE
Nael [facility] - Surgery ([facility])  Brief Operative Note    Surgery Date: 4/27/2022     Surgeon(s) and Role:     * Jesse James MD - Primary    Assisting Surgeon: None    Pre-op Diagnosis:  Larynx cancer [C32.9]    Post-op Diagnosis:  Post-Op Diagnosis Codes:     * Larynx cancer [C32.9]    Procedure(s) (LRB):  LARYNGOSCOPY WITH BIOPSY (N/A)    Anesthesia: General    Operative Findings: base of tongue mass biopsied for permanent pathology.    Estimated Blood Loss: * No values recorded between 4/27/2022  2:15 PM and 4/27/2022  2:34 PM *         Specimens:   Specimen (24h ago, onward)             Start     Ordered    04/27/22 1423  Specimen to Pathology, Surgery ENT  Once        Comments: Pre-op Diagnosis: Larynx cancer [C32.9]Post-op Diagnosis: Same Procedure(s):LARYNGOSCOPY Number of specimens: 1Name of specimens: 1. Base of tongue - Permanent     References:    Click here for ordering Quick Tip   Question Answer Comment   Procedure Type: ENT    Specimen Class: Known or suspected malignancy        04/27/22 1422                  Discharge Note    OUTCOME: Patient tolerated treatment/procedure well without complication and is now ready for discharge.    DISPOSITION: Home or Self Care    FINAL DIAGNOSIS:  S/p laryngectomy.    FOLLOWUP: In clinic    DISCHARGE INSTRUCTIONS:    Discharge Procedure Orders   Diet full liquid   Order Comments: Advance as tolerated     No dressing needed     Notify your health care provider if you experience any of the following:  temperature >100.4     Notify your health care provider if you experience any of the following:  persistent nausea and vomiting or diarrhea     Notify your health care provider if you experience any of the following:  severe uncontrolled pain     Notify your health care provider if you experience any of the following:  redness, tenderness, or signs of infection (pain, swelling, redness, odor or green/yellow discharge around incision site)     Notify your health care  provider if you experience any of the following:  difficulty breathing or increased cough     Activity as tolerated

## 2022-04-27 NOTE — OP NOTE
DATE OF PROCEDURE: 4/27/2022     PREOPERATIVE DIAGNOSES:   1. 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 Base of tongue lesion noted on CT scan     POSTOPERATIVE DIAGNOSES:   Same    SURGEON:  Surgeon(s) and Role:     * Jesse James MD - Primary      PROCEDURES PERFORMED:   Examination under anesthesia with biopsy of base of tongue lesion    ANESTHESIA: General      INDICATIONS FOR PROCEDURE:   Cyrus Batres Jr. is a 71 y.o. known to me status post salvage total laryngectomy.  He recently presented to me with worsening dysphagia.  He subsequently presented to the emergency room with hemoptysis and underwent CT scan of the neck which revealed a lesion of the base of tongue extending into the neopharynx.  He presents today for biopsy of this lesion.    He was apprised of the risks, benefits and alternatives to surgery.  In spite of the risk inherent to surgery,he provided informed consent for the aforementioned procedures.     PROCEDURE IN DETAIL:  The patient was taken to the operating room and placed on the operating table in the supine position.  General endotracheal anesthesia was induced by the anesthesia team.     The operating table was rotated 90° to the right.  The upper teeth were protected with a mouth guard.  The Dedo laryngoscope was inserted through the mouth and into the neopharynx.  At the base of tongue at the junction with the neopharynx, there was an exophytic friable mass.  Representative biopsies were taken.  Hemostasis was achieved with topical Afrin.  Palpation revealed involvement of the left base of tongue extending across the midline and extended the lateral pharyngeal wall.  I was able to place my finger into the esophageal inlet and cannulated with a 20 Vietnamese bougie confirming patency.    He was then handed back to Anesthesia.  He was awakened, extubated and transferred to recovery in satisfactory condition.    There were no  intraoperative complications.  I was present for and participated in the entire procedure as dictated above.       ESTIMATED BLOOD LOSS:  Minimal    SPECIMENS:   Specimen (24h ago, onward)             Start     Ordered    04/27/22 1423  Specimen to Pathology, Surgery ENT  Once        Comments: Pre-op Diagnosis: Larynx cancer [C32.9]Post-op Diagnosis: Same Procedure(s):LARYNGOSCOPY Number of specimens: 1Name of specimens: 1. Base of tongue - Permanent     References:    Click here for ordering Quick Tip   Question Answer Comment   Procedure Type: ENT    Specimen Class: Known or suspected malignancy        04/27/22 6052

## 2022-04-28 ENCOUNTER — PATIENT MESSAGE (OUTPATIENT)
Dept: OTOLARYNGOLOGY | Facility: CLINIC | Age: 72
End: 2022-04-28
Payer: MEDICARE

## 2022-04-28 NOTE — ANESTHESIA POSTPROCEDURE EVALUATION
Anesthesia Post Evaluation    Patient: Cyrus Batres Jr.    Procedure(s) Performed: Procedure(s) (LRB):  LARYNGOSCOPY WITH BIOPSY (N/A)    Final Anesthesia Type: general      Patient location during evaluation: PACU  Patient participation: Yes- Able to Participate  Level of consciousness: awake and alert  Post-procedure vital signs: reviewed and stable  Pain management: adequate  Airway patency: patent    PONV status at discharge: No PONV  Anesthetic complications: no      Cardiovascular status: hemodynamically stable  Respiratory status: unassisted, spontaneous ventilation, Tracheostomy and room air  Hydration status: euvolemic            Vitals Value   /64   Temp 36.8 °C (98.3 °F)   Pulse 76   Resp 20   SpO2 100 %   Vitals shown include unvalidated device data.      No case tracking events are documented in the log.      Pain/Alexi Score: No data recorded

## 2022-05-06 ENCOUNTER — HOSPITAL ENCOUNTER (OUTPATIENT)
Dept: RADIOLOGY | Facility: HOSPITAL | Age: 72
Discharge: HOME | End: 2022-05-06
Attending: OTOLARYNGOLOGY
Payer: MEDICARE

## 2022-05-06 DIAGNOSIS — C32.9 LARYNX CANCER: ICD-10-CM

## 2022-05-06 PROCEDURE — 25500020 PHARM REV CODE 255: Performed by: OTOLARYNGOLOGY

## 2022-05-06 PROCEDURE — 74220 X-RAY XM ESOPHAGUS 1CNTRST: CPT | Mod: 26,,, | Performed by: RADIOLOGY

## 2022-05-06 PROCEDURE — A9698 NON-RAD CONTRAST MATERIALNOC: HCPCS | Performed by: OTOLARYNGOLOGY

## 2022-05-06 PROCEDURE — 74220 X-RAY XM ESOPHAGUS 1CNTRST: CPT | Mod: TC

## 2022-05-06 PROCEDURE — 74220 FL ESOPHAGRAM COMPLETE: ICD-10-PCS | Mod: 26,,, | Performed by: RADIOLOGY

## 2022-05-06 RX ADMIN — BARIUM SULFATE 150 ML: 0.6 SUSPENSION ORAL at 02:05

## 2022-05-09 ENCOUNTER — PATIENT MESSAGE (OUTPATIENT)
Dept: SURGICAL ONCOLOGY | Facility: CLINIC | Age: 72
End: 2022-05-09
Payer: MEDICARE

## 2022-05-10 ENCOUNTER — HOSPITAL ENCOUNTER (OUTPATIENT)
Dept: RADIOLOGY | Facility: HOSPITAL | Age: 72
Discharge: HOME | End: 2022-05-10
Attending: NURSE PRACTITIONER
Payer: MEDICARE

## 2022-05-10 ENCOUNTER — PATIENT MESSAGE (OUTPATIENT)
Dept: OTOLARYNGOLOGY | Facility: CLINIC | Age: 72
End: 2022-05-10
Payer: MEDICARE

## 2022-05-10 ENCOUNTER — TELEPHONE (OUTPATIENT)
Dept: CARDIOLOGY | Facility: CLINIC | Age: 72
End: 2022-05-10

## 2022-05-10 ENCOUNTER — OFFICE VISIT (OUTPATIENT)
Dept: CARDIOLOGY | Facility: CLINIC | Age: 72
End: 2022-05-10
Payer: MEDICARE

## 2022-05-10 VITALS
HEART RATE: 92 BPM | HEIGHT: 66 IN | RESPIRATION RATE: 16 BRPM | DIASTOLIC BLOOD PRESSURE: 70 MMHG | WEIGHT: 144 LBS | OXYGEN SATURATION: 99 % | BODY MASS INDEX: 23.14 KG/M2 | SYSTOLIC BLOOD PRESSURE: 118 MMHG

## 2022-05-10 DIAGNOSIS — I48.0 PAROXYSMAL ATRIAL FIBRILLATION: Chronic | ICD-10-CM

## 2022-05-10 DIAGNOSIS — I47.29 NSVT (NONSUSTAINED VENTRICULAR TACHYCARDIA): Chronic | ICD-10-CM

## 2022-05-10 DIAGNOSIS — S09.90XA HEAD TRAUMA, INITIAL ENCOUNTER: ICD-10-CM

## 2022-05-10 DIAGNOSIS — Z79.4 TYPE 2 DIABETES MELLITUS WITH HYPERGLYCEMIA, WITH LONG-TERM CURRENT USE OF INSULIN: ICD-10-CM

## 2022-05-10 DIAGNOSIS — R49.1 APHONIA: ICD-10-CM

## 2022-05-10 DIAGNOSIS — E11.65 TYPE 2 DIABETES MELLITUS WITH HYPERGLYCEMIA, WITH LONG-TERM CURRENT USE OF INSULIN: ICD-10-CM

## 2022-05-10 DIAGNOSIS — R00.0 TACHYCARDIA: Primary | ICD-10-CM

## 2022-05-10 DIAGNOSIS — Z90.02 S/P LARYNGECTOMY: ICD-10-CM

## 2022-05-10 DIAGNOSIS — R13.14 DYSPHAGIA, PHARYNGOESOPHAGEAL: ICD-10-CM

## 2022-05-10 DIAGNOSIS — C01 MALIGNANT NEOPLASM OF BASE OF TONGUE: Primary | ICD-10-CM

## 2022-05-10 LAB
FINAL PATHOLOGIC DIAGNOSIS: NORMAL
GROSS: NORMAL
Lab: NORMAL

## 2022-05-10 PROCEDURE — 1159F MED LIST DOCD IN RCRD: CPT | Mod: CPTII,S$GLB,, | Performed by: NURSE PRACTITIONER

## 2022-05-10 PROCEDURE — 4010F PR ACE/ARB THEARPY RXD/TAKEN: ICD-10-PCS | Mod: CPTII,S$GLB,, | Performed by: NURSE PRACTITIONER

## 2022-05-10 PROCEDURE — 3066F NEPHROPATHY DOC TX: CPT | Mod: CPTII,S$GLB,, | Performed by: NURSE PRACTITIONER

## 2022-05-10 PROCEDURE — 99499 UNLISTED E&M SERVICE: CPT | Mod: ,,, | Performed by: NURSE PRACTITIONER

## 2022-05-10 PROCEDURE — 3078F PR MOST RECENT DIASTOLIC BLOOD PRESSURE < 80 MM HG: ICD-10-PCS | Mod: CPTII,S$GLB,, | Performed by: NURSE PRACTITIONER

## 2022-05-10 PROCEDURE — 3008F BODY MASS INDEX DOCD: CPT | Mod: CPTII,S$GLB,, | Performed by: NURSE PRACTITIONER

## 2022-05-10 PROCEDURE — 3074F PR MOST RECENT SYSTOLIC BLOOD PRESSURE < 130 MM HG: ICD-10-PCS | Mod: CPTII,S$GLB,, | Performed by: NURSE PRACTITIONER

## 2022-05-10 PROCEDURE — 3066F PR DOCUMENTATION OF TREATMENT FOR NEPHROPATHY: ICD-10-PCS | Mod: CPTII,S$GLB,, | Performed by: NURSE PRACTITIONER

## 2022-05-10 PROCEDURE — 3044F HG A1C LEVEL LT 7.0%: CPT | Mod: CPTII,S$GLB,, | Performed by: NURSE PRACTITIONER

## 2022-05-10 PROCEDURE — 1126F PR PAIN SEVERITY QUANTIFIED, NO PAIN PRESENT: ICD-10-PCS | Mod: CPTII,S$GLB,, | Performed by: NURSE PRACTITIONER

## 2022-05-10 PROCEDURE — 3074F SYST BP LT 130 MM HG: CPT | Mod: CPTII,S$GLB,, | Performed by: NURSE PRACTITIONER

## 2022-05-10 PROCEDURE — 99214 OFFICE O/P EST MOD 30 MIN: CPT | Mod: S$GLB,,, | Performed by: NURSE PRACTITIONER

## 2022-05-10 PROCEDURE — 3060F PR POS MICROALBUMINURIA RESULT DOCUMENTED/REVIEW: ICD-10-PCS | Mod: CPTII,S$GLB,, | Performed by: NURSE PRACTITIONER

## 2022-05-10 PROCEDURE — 3060F POS MICROALBUMINURIA REV: CPT | Mod: CPTII,S$GLB,, | Performed by: NURSE PRACTITIONER

## 2022-05-10 PROCEDURE — 70450 CT HEAD/BRAIN W/O DYE: CPT | Mod: TC

## 2022-05-10 PROCEDURE — 3008F PR BODY MASS INDEX (BMI) DOCUMENTED: ICD-10-PCS | Mod: CPTII,S$GLB,, | Performed by: NURSE PRACTITIONER

## 2022-05-10 PROCEDURE — 1160F PR REVIEW ALL MEDS BY PRESCRIBER/CLIN PHARMACIST DOCUMENTED: ICD-10-PCS | Mod: CPTII,S$GLB,, | Performed by: NURSE PRACTITIONER

## 2022-05-10 PROCEDURE — 4010F ACE/ARB THERAPY RXD/TAKEN: CPT | Mod: CPTII,S$GLB,, | Performed by: NURSE PRACTITIONER

## 2022-05-10 PROCEDURE — 99214 PR OFFICE/OUTPT VISIT, EST, LEVL IV, 30-39 MIN: ICD-10-PCS | Mod: S$GLB,,, | Performed by: NURSE PRACTITIONER

## 2022-05-10 PROCEDURE — 3044F PR MOST RECENT HEMOGLOBIN A1C LEVEL <7.0%: ICD-10-PCS | Mod: CPTII,S$GLB,, | Performed by: NURSE PRACTITIONER

## 2022-05-10 PROCEDURE — 99499 RISK ADDL DX/OHS AUDIT: ICD-10-PCS | Mod: ,,, | Performed by: NURSE PRACTITIONER

## 2022-05-10 PROCEDURE — 1126F AMNT PAIN NOTED NONE PRSNT: CPT | Mod: CPTII,S$GLB,, | Performed by: NURSE PRACTITIONER

## 2022-05-10 PROCEDURE — 1159F PR MEDICATION LIST DOCUMENTED IN MEDICAL RECORD: ICD-10-PCS | Mod: CPTII,S$GLB,, | Performed by: NURSE PRACTITIONER

## 2022-05-10 PROCEDURE — 1160F RVW MEDS BY RX/DR IN RCRD: CPT | Mod: CPTII,S$GLB,, | Performed by: NURSE PRACTITIONER

## 2022-05-10 PROCEDURE — 3078F DIAST BP <80 MM HG: CPT | Mod: CPTII,S$GLB,, | Performed by: NURSE PRACTITIONER

## 2022-05-10 NOTE — TELEPHONE ENCOUNTER
----- Message from Anne Ugarte NP sent at 5/10/2022  1:39 PM CDT -----  Ct of head is negative for any bleeding. No concerns presently.

## 2022-05-10 NOTE — PROGRESS NOTES
Subjective:    Patient ID:  Cyrus Batres Jr. is a 71 y.o. male     HPI:    Patient presents today for follow up appointment. He is nonverbal due to laryngectomy. He communicate via writing tablet. Denies any complaints. No chest pain, dizziness, shortness of breath, or palpitations. His apple watch alerts him that his HR is high at times. He was taken off diltiazem recently.  He has been taking Eliquis without any bleeding issues. However he did have an episode of sleep walking on Saturday and hit his head. Denies any neuro deficits.       Review of patient's allergies indicates:   Allergen Reactions    Pollen extracts     Lovastatin Rash     Not confirmed but pt skeptical       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     Past Surgical History:   Procedure Laterality Date    DIRECT LARYNGOBRONCHOSCOPY N/A 12/27/2021    Procedure: LARYNGOSCOPY, DIRECT, WITH BRONCHOSCOPY;  Surgeon: Flex Espinosa MD;  Location: [facility] OR [address] [place] [address];  Service: ENT;  Laterality: N/A;    DISSECTION OF NECK Bilateral 1/6/2022    Procedure: DISSECTION, NECK;  Surgeon: Jesse James MD;  Location: [facility] OR [address] [place] [address];  Service: ENT;  Laterality: Bilateral;    FLAP PROCEDURE Right 1/6/2022    Procedure: CREATION, FREE FLAP;  Surgeon: Alise Hart MD;  Location: [facility] OR [address] [place] [address];  Service: ENT;  Laterality: Right;  Ischemic start 1351  Ischemic stop 1502    LARYNGECTOMY N/A 1/6/2022    Procedure: LARYNGECTOMY;  Surgeon: Jesse James MD;  [facility];  Service: ENT;  Laterality: N/A;    LARYNGOSCOPY N/A 8/4/2020    Procedure: Suspension microlaryngoscopy with biopsy, possible KTP laser treatment/excision;  Surgeon: Stew Noel MD;  Location: [facility] OR [address] [place] [address];  Service: ENT;  Laterality: N/A;  Microscope, telescopes, tower,  microinstruments, KTP laser, rep conf# [id number] IC 7/28.    LARYNGOSCOPY N/A 3/16/2021    Procedure: Suspension microlaryngoscopy with excision of lesion, possible CO2 laser;  Surgeon: Stew Noel MD;  [facility];  Service: ENT;  Laterality: N/A;  Microscope, telescopes, tower, microinstruments, CO2 laser, rep conf# [id number] IC 3/4.    LARYNGOSCOPY N/A 4/1/2021    Procedure: Suspension microlaryngoscopy with KTP laser excision of lesion;  Surgeon: Stew Noel MD;  [facility];  Service: ENT;  Laterality: N/A;  Microscope, telescopes, tower, microinstruments, 70 degree scope, vocal fold , KTP laser, rep conf# [id number] BC    LARYNGOSCOPY N/A 12/9/2021    Procedure: Suspension microlaryngoscopy with biopsy;  Surgeon: Stew Noel MD;  [facility];  Service: ENT;  Laterality: N/A;  Microscope, telescopes, tower, microinstruments    LARYNGOSCOPY N/A 1/6/2022    Procedure: LARYNGOSCOPY;  Surgeon: Jesse James MD;  Location: [facility] OR [address] [place] [address];  Service: ENT;  Laterality: N/A;    LARYNGOSCOPY N/A 4/27/2022    Procedure: LARYNGOSCOPY WITH BIOPSY;  Surgeon: Jesse James MD;  [address];  Service: ENT;  Laterality: N/A;    MICROLARYNGOSCOPY N/A 3/17/2020    Procedure: MICROLARYNGOSCOPY;  Surgeon: Jung Xiao MD;  Location: [facility];  Service: ENT;  Laterality: N/A;  Laser Microlaryngoscopy  NEED TO SCHEDULE LASER from [facility] [id number]    REIMPLANTATION OF PARATHYROID TISSUE N/A 1/6/2022    Procedure: REIMPLANTATION, PARATHYROID TISSUE;  Surgeon: Jesse James MD;  [facility];  Service: ENT;  Laterality: N/A;    THYROIDECTOMY  1/6/2022    Procedure: THYROIDECTOMY;  Surgeon: Jesse James MD;  [facility];  Service: ENT;;    TRACHEOSTOMY N/A 12/27/2021    Procedure: CREATION, TRACHEOSTOMY;  Surgeon: Flex Espinosa MD;  Location: [facility] OR [address] [place] [address];  Service: ENT;   Laterality: N/A;     Social History     Tobacco Use    Smoking status: Never Smoker    Smokeless tobacco: Never Used   Substance Use Topics    Alcohol use: Not Currently     Comment: occasional    Drug use: No     Family History   Problem Relation Age of Onset    Abnormal EKG Mother     Diabetes Father     Heart disease Father     Hypertension Father         Review of Systems:   Constitution: Negative for diaphoresis and fever.   HEENT: Negative for nosebleeds.  + nonverbal  Cardiovascular: Negative for chest pain       No dyspnea on exertion       No leg swelling        No palpitations  Respiratory: Negative for shortness of breath and wheezing.    Hematologic/Lymphatic: Negative for bleeding problem. Does not bruise/bleed easily.   Skin: Negative for color change and rash.   Musculoskeletal: + for fall  Gastrointestinal: Negative for hematemesis and hematochezia.   Genitourinary: Negative for hematuria.   Neurological: Negative for dizziness and light-headedness.   Psychiatric/Behavioral: Negative for altered mental status and memory loss.          Objective:        Vitals:    05/10/22 1033   BP: 118/70   Pulse: 92   Resp: 16       Lab Results   Component Value Date    WBC 5.32 04/23/2022    HGB 11.0 (L) 04/23/2022    HCT 35.0 (L) 04/23/2022     04/23/2022    CHOL 144 02/14/2022    TRIG 103 02/14/2022    HDL 48 02/14/2022    ALT 15 04/23/2022    AST 19 04/23/2022     (L) 04/23/2022    K 3.9 04/23/2022     04/23/2022    CREATININE 1.0 04/23/2022    BUN 18 04/23/2022    CO2 23 04/23/2022    TSH 0.052 (L) 02/17/2022    INR 1.2 04/23/2022    HGBA1C 5.4 02/14/2022    MICROALBUR 112.2 10/18/2018        ECHOCARDIOGRAM RESULTS  No results found for this or any previous visit.        CURRENT/PREVIOUS VISIT EKG  Results for orders placed or performed in visit on 12/25/21   EKG 12-lead    Collection Time: 12/25/21  1:49 AM    Narrative    Test Reason :     Vent. Rate : 099 BPM     Atrial Rate : 099  "BPM     P-R Int : 148 ms          QRS Dur : 080 ms      QT Int : 324 ms       P-R-T Axes : 052 019 015 degrees     QTc Int : 415 ms    Normal sinus rhythm  Normal ECG  When compared with ECG of 24-DEC-2021 19:19,  T wave inversion now evident in Inferior leads  Confirmed by VIC MEZA MD ([id number]) on 12/25/2021 11:02:36 AM    Referred By: CANDIDA YT           Confirmed By:VIC MEZA MD     No valid procedures specified.   No results found for this or any previous visit.      Physical Exam:  CONSTITUTIONAL: No fever, no chills  HEENT: Normocephalic, pupils reactive to light; + bruise on left side of scalp                 NECK:  No JVD no carotid bruit  CVS: S1S2+, RRR  LUNGS: Clear  ABDOMEN: Soft, NT, BS+  EXTREMITIES: No cyanosis, edema  : No mahan catheter  NEURO: AAO X 3  PSY: Normal affect      Medication List with Changes/Refills   Current Medications    APIXABAN (ELIQUIS) 5 MG TAB    Take 1 tablet (5 mg total) by mouth 2 (two) times daily.    ASPIRIN (ECOTRIN) 81 MG EC TABLET    Take 1 tablet by mouth Daily.    BLOOD SUGAR DIAGNOSTIC (BLOOD GLUCOSE TEST) STRP    1 strip by Misc.(Non-Drug; Combo Route) route 2 (two) times daily before meals.    CICLOPIROX (LOPROX) 0.77 % CREA    Apply topically 2 (two) times daily.    ERGOCALCIFEROL (ERGOCALCIFEROL) 50,000 UNIT CAP    TAKE 1 CAPSULE TWICE WEEKLY    IPRATROPIUM (ATROVENT) 21 MCG (0.03 %) NASAL SPRAY    2 sprays by Nasal route 2 (two) times daily.    LEVOTHYROXINE (SYNTHROID) 100 MCG TABLET    Take 1 tablet (100 mcg total) by mouth before breakfast.    LOSARTAN (COZAAR) 100 MG TABLET    TAKE 1 TABLET BY MOUTH EVERY DAY    PEN NEEDLE, DIABETIC (BD ULTRA-FINE YULISSA PEN NEEDLE) 32 GAUGE X 5/32" NDLE    Use once weekly.   Discontinued Medications    DILTIAZEM (CARDIZEM) 30 MG TABLET    take 1 tablet (30 mg total) by Per G Tube route every 6 (six) hours.             Assessment:       1. Tachycardia    2. Head trauma, initial encounter    3. Paroxysmal " atrial fibrillation    4. NSVT (nonsustained ventricular tachycardia)    5. Type 2 diabetes mellitus with hyperglycemia, with long-term current use of insulin    6. Dysphagia, pharyngoesophageal    7. S/P laryngectomy    8. Aphonia         Plan:       1. Check a 24 hour holter to evaluate tachycardia burden.   2. Check a stat head CT to evaluate for any intracranial bleeding.   3. Monitor BP at home. Consider lowering losartan if a AV tamia blocker is needed.   4. Advised on the risk of falls and head injuries while on anticoagulation.   5. Will see him back for follow up in about 6 months. Will discuss results at they become available.     Problem List Items Addressed This Visit        Unprioritized    Paroxysmal atrial fibrillation (Chronic)    Overview     Patient was recently hospitalized with bilateral pneumonia at [facility]. He was treated accordingly with antibiotics and breathing treatment. He was also found to have atrial fibrillation and was started on Eliquis.           NSVT (nonsustained ventricular tachycardia) (Chronic)    Overview     Negative TM-myoview stress test.            Type 2 diabetes mellitus with hyperglycemia    S/P laryngectomy    Aphonia    Dysphagia, pharyngoesophageal      Other Visit Diagnoses     Tachycardia    -  Primary    Relevant Orders    Holter monitor - 24 hour    Head trauma, initial encounter        Relevant Orders    CT Head Without Contrast          Follow up in about 6 months (around 11/10/2022).

## 2022-05-11 ENCOUNTER — PATIENT MESSAGE (OUTPATIENT)
Dept: CARDIOLOGY | Facility: CLINIC | Age: 72
End: 2022-05-11
Payer: MEDICARE

## 2022-05-11 ENCOUNTER — PATIENT MESSAGE (OUTPATIENT)
Dept: OTOLARYNGOLOGY | Facility: CLINIC | Age: 72
End: 2022-05-11
Payer: MEDICARE

## 2022-05-12 ENCOUNTER — PATIENT MESSAGE (OUTPATIENT)
Dept: SURGICAL ONCOLOGY | Facility: CLINIC | Age: 72
End: 2022-05-12
Payer: MEDICARE

## 2022-05-13 ENCOUNTER — HOSPITAL ENCOUNTER (OUTPATIENT)
Dept: RADIOLOGY | Facility: HOSPITAL | Age: 72
Discharge: HOME | End: 2022-05-13
Attending: OTOLARYNGOLOGY
Payer: MEDICARE

## 2022-05-13 ENCOUNTER — PATIENT MESSAGE (OUTPATIENT)
Dept: SURGICAL ONCOLOGY | Facility: CLINIC | Age: 72
End: 2022-05-13
Payer: MEDICARE

## 2022-05-13 VITALS — HEIGHT: 67 IN | WEIGHT: 140 LBS | BODY MASS INDEX: 21.97 KG/M2

## 2022-05-13 DIAGNOSIS — C01 MALIGNANT NEOPLASM OF BASE OF TONGUE: ICD-10-CM

## 2022-05-13 LAB — GLUCOSE SERPL-MCNC: 175 MG/DL (ref 70–110)

## 2022-05-13 PROCEDURE — 78815 PET IMAGE W/CT SKULL-THIGH: CPT | Mod: TC,PO,PS

## 2022-05-15 ENCOUNTER — HOSPITAL ENCOUNTER (EMERGENCY)
Facility: HOSPITAL | Age: 72
Discharge: ANOTHER HOSPITAL | End: 2022-05-15
Attending: EMERGENCY MEDICINE
Payer: MEDICARE

## 2022-05-15 ENCOUNTER — HOSPITAL ENCOUNTER (OUTPATIENT)
Facility: HOSPITAL | Age: 72
LOS: 1 days | Discharge: HOME | End: 2022-05-16
Attending: EMERGENCY MEDICINE | Admitting: OTOLARYNGOLOGY
Payer: MEDICARE

## 2022-05-15 VITALS
OXYGEN SATURATION: 100 % | WEIGHT: 140 LBS | TEMPERATURE: 98 F | BODY MASS INDEX: 22.26 KG/M2 | HEART RATE: 79 BPM | SYSTOLIC BLOOD PRESSURE: 145 MMHG | DIASTOLIC BLOOD PRESSURE: 79 MMHG | RESPIRATION RATE: 17 BRPM

## 2022-05-15 DIAGNOSIS — C02.9 TONGUE CANCER: ICD-10-CM

## 2022-05-15 DIAGNOSIS — D64.9 ANEMIA, UNSPECIFIED TYPE: ICD-10-CM

## 2022-05-15 DIAGNOSIS — K13.79 ORAL BLEEDING: Primary | ICD-10-CM

## 2022-05-15 DIAGNOSIS — C32.9 LARYNX CANCER: ICD-10-CM

## 2022-05-15 LAB
ABO + RH BLD: NORMAL
ALBUMIN SERPL BCP-MCNC: 4 G/DL (ref 3.5–5.2)
ALP SERPL-CCNC: 82 U/L (ref 55–135)
ALT SERPL W/O P-5'-P-CCNC: 15 U/L (ref 10–44)
ANION GAP SERPL CALC-SCNC: 11 MMOL/L (ref 8–16)
APTT PPP: 32.4 SEC (ref 23.3–35.1)
AST SERPL-CCNC: 16 U/L (ref 10–40)
BASOPHILS # BLD AUTO: 0.01 K/UL (ref 0–0.2)
BASOPHILS # BLD AUTO: 0.03 K/UL (ref 0–0.2)
BASOPHILS NFR BLD: 0.1 % (ref 0–1.9)
BASOPHILS NFR BLD: 0.5 % (ref 0–1.9)
BILIRUB SERPL-MCNC: 0.9 MG/DL (ref 0.1–1)
BLD GP AB SCN CELLS X3 SERPL QL: NORMAL
BUN SERPL-MCNC: 27 MG/DL (ref 8–23)
CALCIUM SERPL-MCNC: 9.1 MG/DL (ref 8.7–10.5)
CHLORIDE SERPL-SCNC: 106 MMOL/L (ref 95–110)
CO2 SERPL-SCNC: 22 MMOL/L (ref 23–29)
CREAT SERPL-MCNC: 1 MG/DL (ref 0.5–1.4)
DIFFERENTIAL METHOD: ABNORMAL
DIFFERENTIAL METHOD: ABNORMAL
EOSINOPHIL # BLD AUTO: 0.1 K/UL (ref 0–0.5)
EOSINOPHIL # BLD AUTO: 0.2 K/UL (ref 0–0.5)
EOSINOPHIL NFR BLD: 2.1 % (ref 0–8)
EOSINOPHIL NFR BLD: 2.6 % (ref 0–8)
ERYTHROCYTE [DISTWIDTH] IN BLOOD BY AUTOMATED COUNT: 14.8 % (ref 11.5–14.5)
ERYTHROCYTE [DISTWIDTH] IN BLOOD BY AUTOMATED COUNT: 14.9 % (ref 11.5–14.5)
EST. GFR  (AFRICAN AMERICAN): >60 ML/MIN/1.73 M^2
EST. GFR  (NON AFRICAN AMERICAN): >60 ML/MIN/1.73 M^2
ESTIMATED AVG GLUCOSE: 143 MG/DL (ref 68–131)
GLUCOSE SERPL-MCNC: 151 MG/DL (ref 70–110)
HBA1C MFR BLD: 6.6 % (ref 4–5.6)
HCT VFR BLD AUTO: 28.4 % (ref 40–54)
HCT VFR BLD AUTO: 30 % (ref 40–54)
HGB BLD-MCNC: 9.1 G/DL (ref 14–18)
HGB BLD-MCNC: 9.4 G/DL (ref 14–18)
IMM GRANULOCYTES # BLD AUTO: 0.02 K/UL (ref 0–0.04)
IMM GRANULOCYTES # BLD AUTO: 0.03 K/UL (ref 0–0.04)
IMM GRANULOCYTES NFR BLD AUTO: 0.3 % (ref 0–0.5)
IMM GRANULOCYTES NFR BLD AUTO: 0.4 % (ref 0–0.5)
INR PPP: 1.5
LYMPHOCYTES # BLD AUTO: 0.8 K/UL (ref 1–4.8)
LYMPHOCYTES # BLD AUTO: 0.9 K/UL (ref 1–4.8)
LYMPHOCYTES NFR BLD: 11.6 % (ref 18–48)
LYMPHOCYTES NFR BLD: 13.3 % (ref 18–48)
MCH RBC QN AUTO: 25.8 PG (ref 27–31)
MCH RBC QN AUTO: 25.9 PG (ref 27–31)
MCHC RBC AUTO-ENTMCNC: 31.3 G/DL (ref 32–36)
MCHC RBC AUTO-ENTMCNC: 32 G/DL (ref 32–36)
MCV RBC AUTO: 81 FL (ref 82–98)
MCV RBC AUTO: 82 FL (ref 82–98)
MONOCYTES # BLD AUTO: 0.4 K/UL (ref 0.3–1)
MONOCYTES # BLD AUTO: 0.5 K/UL (ref 0.3–1)
MONOCYTES NFR BLD: 6.1 % (ref 4–15)
MONOCYTES NFR BLD: 7.3 % (ref 4–15)
NEUTROPHILS # BLD AUTO: 5 K/UL (ref 1.8–7.7)
NEUTROPHILS # BLD AUTO: 5.2 K/UL (ref 1.8–7.7)
NEUTROPHILS NFR BLD: 77.7 % (ref 38–73)
NEUTROPHILS NFR BLD: 78 % (ref 38–73)
NRBC BLD-RTO: 0 /100 WBC
NRBC BLD-RTO: 0 /100 WBC
PLATELET # BLD AUTO: 235 K/UL (ref 150–450)
PLATELET # BLD AUTO: 240 K/UL (ref 150–450)
PMV BLD AUTO: 10.6 FL (ref 9.2–12.9)
PMV BLD AUTO: 10.7 FL (ref 9.2–12.9)
POCT GLUCOSE: 144 MG/DL (ref 70–110)
POCT GLUCOSE: 231 MG/DL (ref 70–110)
POTASSIUM SERPL-SCNC: 4.2 MMOL/L (ref 3.5–5.1)
PROT SERPL-MCNC: 7.1 G/DL (ref 6–8.4)
PROTHROMBIN TIME: 17.2 SEC (ref 11.4–13.7)
RBC # BLD AUTO: 3.51 M/UL (ref 4.6–6.2)
RBC # BLD AUTO: 3.65 M/UL (ref 4.6–6.2)
SARS-COV-2 RDRP RESP QL NAA+PROBE: NEGATIVE
SODIUM SERPL-SCNC: 139 MMOL/L (ref 136–145)
T4 FREE SERPL-MCNC: 0.95 NG/DL (ref 0.71–1.51)
TSH SERPL DL<=0.005 MIU/L-ACNC: 0.14 UIU/ML (ref 0.4–4)
WBC # BLD AUTO: 6.44 K/UL (ref 3.9–12.7)
WBC # BLD AUTO: 6.69 K/UL (ref 3.9–12.7)

## 2022-05-15 PROCEDURE — 84439 ASSAY OF FREE THYROXINE: CPT | Performed by: STUDENT IN AN ORGANIZED HEALTH CARE EDUCATION/TRAINING PROGRAM

## 2022-05-15 PROCEDURE — 85025 COMPLETE CBC W/AUTO DIFF WBC: CPT | Performed by: EMERGENCY MEDICINE

## 2022-05-15 PROCEDURE — 99285 EMERGENCY DEPT VISIT HI MDM: CPT | Mod: 25

## 2022-05-15 PROCEDURE — 25000003 PHARM REV CODE 250: Performed by: STUDENT IN AN ORGANIZED HEALTH CARE EDUCATION/TRAINING PROGRAM

## 2022-05-15 PROCEDURE — U0002 COVID-19 LAB TEST NON-CDC: HCPCS | Performed by: EMERGENCY MEDICINE

## 2022-05-15 PROCEDURE — 99285 PR EMERGENCY DEPT VISIT,LEVEL V: ICD-10-PCS | Mod: ,,, | Performed by: EMERGENCY MEDICINE

## 2022-05-15 PROCEDURE — 36415 COLL VENOUS BLD VENIPUNCTURE: CPT | Performed by: STUDENT IN AN ORGANIZED HEALTH CARE EDUCATION/TRAINING PROGRAM

## 2022-05-15 PROCEDURE — 85730 THROMBOPLASTIN TIME PARTIAL: CPT | Performed by: EMERGENCY MEDICINE

## 2022-05-15 PROCEDURE — 63600175 PHARM REV CODE 636 W HCPCS: Performed by: STUDENT IN AN ORGANIZED HEALTH CARE EDUCATION/TRAINING PROGRAM

## 2022-05-15 PROCEDURE — 84443 ASSAY THYROID STIM HORMONE: CPT | Performed by: STUDENT IN AN ORGANIZED HEALTH CARE EDUCATION/TRAINING PROGRAM

## 2022-05-15 PROCEDURE — 86850 RBC ANTIBODY SCREEN: CPT

## 2022-05-15 PROCEDURE — 85025 COMPLETE CBC W/AUTO DIFF WBC: CPT | Mod: 91

## 2022-05-15 PROCEDURE — 93010 ELECTROCARDIOGRAM REPORT: CPT | Mod: ,,, | Performed by: INTERNAL MEDICINE

## 2022-05-15 PROCEDURE — 85610 PROTHROMBIN TIME: CPT | Performed by: EMERGENCY MEDICINE

## 2022-05-15 PROCEDURE — 93010 EKG 12-LEAD: ICD-10-PCS | Mod: ,,, | Performed by: INTERNAL MEDICINE

## 2022-05-15 PROCEDURE — 99285 EMERGENCY DEPT VISIT HI MDM: CPT | Mod: ,,, | Performed by: EMERGENCY MEDICINE

## 2022-05-15 PROCEDURE — 83036 HEMOGLOBIN GLYCOSYLATED A1C: CPT | Performed by: STUDENT IN AN ORGANIZED HEALTH CARE EDUCATION/TRAINING PROGRAM

## 2022-05-15 PROCEDURE — 99285 EMERGENCY DEPT VISIT HI MDM: CPT

## 2022-05-15 PROCEDURE — 20600001 HC STEP DOWN PRIVATE ROOM

## 2022-05-15 PROCEDURE — 80053 COMPREHEN METABOLIC PANEL: CPT | Performed by: EMERGENCY MEDICINE

## 2022-05-15 PROCEDURE — 93005 ELECTROCARDIOGRAM TRACING: CPT

## 2022-05-15 RX ORDER — GLUCAGON 1 MG
1 KIT INJECTION
Status: DISCONTINUED | OUTPATIENT
Start: 2022-05-15 | End: 2022-05-16 | Stop reason: HOSPADM

## 2022-05-15 RX ORDER — LOSARTAN POTASSIUM 50 MG/1
100 TABLET ORAL NIGHTLY
Status: DISCONTINUED | OUTPATIENT
Start: 2022-05-15 | End: 2022-05-16 | Stop reason: HOSPADM

## 2022-05-15 RX ORDER — INSULIN ASPART 100 [IU]/ML
0-5 INJECTION, SOLUTION INTRAVENOUS; SUBCUTANEOUS
Status: DISCONTINUED | OUTPATIENT
Start: 2022-05-15 | End: 2022-05-16 | Stop reason: HOSPADM

## 2022-05-15 RX ORDER — IBUPROFEN 200 MG
16 TABLET ORAL
Status: DISCONTINUED | OUTPATIENT
Start: 2022-05-15 | End: 2022-05-16 | Stop reason: HOSPADM

## 2022-05-15 RX ORDER — HYDROGEN PEROXIDE 3 %
20 SOLUTION, NON-ORAL MISCELLANEOUS DAILY
Status: ON HOLD | COMMUNITY
End: 2022-06-10 | Stop reason: HOSPADM

## 2022-05-15 RX ORDER — IBUPROFEN 200 MG
24 TABLET ORAL
Status: DISCONTINUED | OUTPATIENT
Start: 2022-05-15 | End: 2022-05-16 | Stop reason: HOSPADM

## 2022-05-15 RX ORDER — LEVOTHYROXINE SODIUM 50 UG/1
100 TABLET ORAL
Status: DISCONTINUED | OUTPATIENT
Start: 2022-05-16 | End: 2022-05-16 | Stop reason: HOSPADM

## 2022-05-15 RX ORDER — SODIUM CHLORIDE 0.9 % (FLUSH) 0.9 %
10 SYRINGE (ML) INJECTION
Status: DISCONTINUED | OUTPATIENT
Start: 2022-05-15 | End: 2022-05-16 | Stop reason: HOSPADM

## 2022-05-15 RX ADMIN — LOSARTAN POTASSIUM 100 MG: 50 TABLET, FILM COATED ORAL at 09:05

## 2022-05-15 RX ADMIN — INSULIN ASPART 2 UNITS: 100 INJECTION, SOLUTION INTRAVENOUS; SUBCUTANEOUS at 05:05

## 2022-05-15 NOTE — ED NOTES
Arrives by EMS as transfer from [facility] r/t tongue bleeding. Hx vocal cord CA. Last night midnight difficulty swallowing , states he was sleeping and woke to bleeding. + Plavix. Tracheostomy 4 months post op.  Yankauer suction at bedside, airway maintained at this time. AAOX4.    Patient identifiers for Cyrus Batres Jr. 71 y.o. male checked and correct.  Chief Complaint   Patient presents with    oral bleeding     Pt is a transfer from [facility] sent for evaluation of a bleeding tongue. Hx of vocal chord cancer; pt on Eliquis.      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     Allergies reported:   Review of patient's allergies indicates:   Allergen Reactions    Pollen extracts     Lovastatin Rash     Not confirmed but pt skeptical       Appearance: Pt awake, alert & oriented to person, place & time. Pt in no acute distress at present time. Pt is clean and well groomed with clothes appropriately fastened.   Skin: Skin warm, dry & intact. Color consistent with ethnicity. Mucous membranes moist. No breakdown or brusing noted.   Musculoskeletal: Patient moving all extremities well, no obvious swelling or deformities noted.   Respiratory: Respirations spontaneous, even, and non-labored. Visible chest rise noted. Airway is open and patent. No accessory muscle use noted.   Neurologic: Sensation is intact. Speech is clear and appropriate. Eyes open spontaneously, behavior appropriate to situation, follows commands, facial expression symmetrical, bilateral hand grasp equal and even, purposeful motor response noted.  Cardiac: All peripheral pulses present. No Bilateral lower extremity edema. Cap refill is <3 seconds.  Abdomen: Abdomen soft, non distended, non tender to palpation.   : Pt reports no dysuria or hematuria.

## 2022-05-15 NOTE — ED PROVIDER NOTES
Encounter Date: 5/15/2022       History     Chief Complaint   Patient presents with    oral bleeding     Pt is a transfer from [facility] sent for evaluation of a bleeding tongue. Hx of vocal chord cancer; pt on Eliquis.      71-year-old male with past medical history of laryngeal SCC s/p laryngectomy, atrial fibrillation, type 2 diabetes and hypertension presents to the ED as a transfer due to a 1 day history of hemoptysis.   Patient also complains of worsening dysphagia secondary to a malignancy at the base of the tongue but was otherwise doing well until last night.  Patient does report of similar episodes of bleeding, however, they usually resolved within an hour.  This particular episode lasted approximately 3 hours prior to him presenting to the previous ED.   Patient denies chest pain, shortness of breath, abdominal pain, headache, fever, chills, nausea, vomiting, diarrhea.  No other complaints at this time.    The history is provided by the patient, the EMS personnel and medical records. History limited by: Inability to phonate due to laryngectomy.     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     Past Surgical History:   Procedure Laterality Date    DIRECT LARYNGOBRONCHOSCOPY N/A 12/27/2021    Procedure: LARYNGOSCOPY, DIRECT, WITH BRONCHOSCOPY;  Surgeon: Flex Espinosa MD;  Location: [facility] OR [address] [place] [address];  Service: ENT;  Laterality: N/A;    DISSECTION OF NECK Bilateral 1/6/2022    Procedure: DISSECTION, NECK;  Surgeon: Jesse aJmes MD;  Location: [facility] OR [address] [place] [address];  Service: ENT;  Laterality: Bilateral;    FLAP PROCEDURE Right 1/6/2022    Procedure: CREATION, FREE FLAP;  Surgeon: Alise Hart MD;   Location: [facility] OR 2ND FLR;  Service: ENT;  Laterality: Right;  Ischemic start 1351  Ischemic stop 1502    LARYNGECTOMY N/A 1/6/2022    Procedure: LARYNGECTOMY;  Surgeon: Jesse James MD;  Location: [facility] OR 2ND FLR;  Service: ENT;  Laterality: N/A;    LARYNGOSCOPY N/A 8/4/2020    Procedure: Suspension microlaryngoscopy with biopsy, possible KTP laser treatment/excision;  Surgeon: Stew Noel MD;  Location: [facility] OR 2ND FLR;  Service: ENT;  Laterality: N/A;  Microscope, telescopes, tower, microinstruments, KTP laser, rep conf# [id number] IC 7/28.    LARYNGOSCOPY N/A 3/16/2021    Procedure: Suspension microlaryngoscopy with excision of lesion, possible CO2 laser;  Surgeon: Stew Noel MD;  Location: [facility] OR 2ND FLR;  Service: ENT;  Laterality: N/A;  Microscope, telescopes, tower, microinstruments, CO2 laser, rep conf# [id number] IC 3/4.    LARYNGOSCOPY N/A 4/1/2021    Procedure: Suspension microlaryngoscopy with KTP laser excision of lesion;  Surgeon: Stew Noel MD;  [facility];  Service: ENT;  Laterality: N/A;  Microscope, telescopes, tower, microinstruments, 70 degree scope, vocal fold , KTP laser, rep conf# [id number] BC    LARYNGOSCOPY N/A 12/9/2021    Procedure: Suspension microlaryngoscopy with biopsy;  Surgeon: Stew Noel MD;  Location: [facility] OR 2ND FLR;  Service: ENT;  Laterality: N/A;  Microscope, telescopes, tower, microinstruments    LARYNGOSCOPY N/A 1/6/2022    Procedure: LARYNGOSCOPY;  Surgeon: Jesse James MD;  Location: [facility] OR 2ND FLR;  Service: ENT;  Laterality: N/A;    LARYNGOSCOPY N/A 4/27/2022    Procedure: LARYNGOSCOPY WITH BIOPSY;  Surgeon: Jesse James MD;  Location: [facility] OR 2ND FLR;  Service: ENT;  Laterality: N/A;    MICROLARYNGOSCOPY N/A 3/17/2020    Procedure: MICROLARYNGOSCOPY;  Surgeon: Jung Xiao MD;  Location: [facility];  Service: ENT;  Laterality: N/A;  Laser Microlaryngoscopy  NEED TO SCHEDULE LASER from  [facility] [id number]    REIMPLANTATION OF PARATHYROID TISSUE N/A 1/6/2022    Procedure: REIMPLANTATION, PARATHYROID TISSUE;  Surgeon: Jesse James MD;  Location: [facility] OR [address] [place] [address];  Service: ENT;  Laterality: N/A;    THYROIDECTOMY  1/6/2022    Procedure: THYROIDECTOMY;  Surgeon: Jesse James MD;  Location: [facility] OR [address] [place] [address];  Service: ENT;;    TRACHEOSTOMY N/A 12/27/2021    Procedure: CREATION, TRACHEOSTOMY;  Surgeon: Flex Espinosa MD;  Location: [facility] OR [address] [place] [address];  Service: ENT;  Laterality: N/A;     Family History   Problem Relation Age of Onset    Abnormal EKG Mother     Diabetes Father     Heart disease Father     Hypertension Father      Social History     Tobacco Use    Smoking status: Never Smoker    Smokeless tobacco: Never Used   Substance Use Topics    Alcohol use: Not Currently     Comment: occasional    Drug use: No     Review of Systems   Constitutional: Negative for activity change, chills and fever.   HENT: Positive for trouble swallowing. Negative for congestion, ear pain and sore throat.         Dysphagia and hemoptysis.   Respiratory: Negative for shortness of breath and stridor.    Cardiovascular: Negative for chest pain and palpitations.   Gastrointestinal: Negative for abdominal pain, nausea and vomiting.   Genitourinary: Negative for dysuria.   Musculoskeletal: Negative for back pain.   Skin: Negative for rash.   Neurological: Negative for dizziness, syncope, weakness and headaches.   Hematological: Does not bruise/bleed easily.       Physical Exam     Initial Vitals [05/15/22 0747]   BP Pulse Resp Temp SpO2   126/60 72 16 98.5 °F (36.9 °C) 98 %      MAP       --         Physical Exam    Nursing note and vitals reviewed.  Constitutional: Vital signs are normal. He appears well-developed and well-nourished. He is not diaphoretic. No distress.   HENT:   Head: Normocephalic and atraumatic.   Right Ear: External ear normal.   Left Ear: External ear normal.   Blood clots  within oropharynx that patient is actively suctioning.   Neck: Trachea normal. Neck supple. No thyroid mass present.   Tracheostomy stoma present without any bleeding or erythema.   Cardiovascular: Normal rate, regular rhythm, normal heart sounds and intact distal pulses. Exam reveals no gallop and no friction rub.    No murmur heard.  Pulmonary/Chest: Breath sounds normal. No respiratory distress. He has no wheezes. He has no rhonchi. He has no rales.   Abdominal: Abdomen is soft. Bowel sounds are normal. He exhibits no distension. There is no abdominal tenderness. There is no rebound and no guarding.   Musculoskeletal:      Cervical back: Neck supple.     Neurological: He is alert. He has normal strength. No cranial nerve deficit or sensory deficit. GCS score is 15. GCS eye subscore is 4. GCS verbal subscore is 5. GCS motor subscore is 6.   Skin: Skin is warm and dry. Capillary refill takes less than 2 seconds. No rash noted.   Psychiatric: He has a normal mood and affect.         ED Course   Procedures  Labs Reviewed   CBC W/ AUTO DIFFERENTIAL - Abnormal; Notable for the following components:       Result Value    RBC 3.51 (*)     Hemoglobin 9.1 (*)     Hematocrit 28.4 (*)     MCV 81 (*)     MCH 25.9 (*)     RDW 14.9 (*)     Lymph # 0.9 (*)     Gran % 78.0 (*)     Lymph % 13.3 (*)     All other components within normal limits   TYPE & SCREEN   POCT GLUCOSE MONITORING CONTINUOUS     EKG Readings: (Independently Interpreted)   Initial Reading: No STEMI. Rhythm: Normal Sinus Rhythm. Heart Rate: 83. Ectopy: No Ectopy. Conduction: Normal. ST Segments: Normal ST Segments. T Waves: Normal. Axis: Normal.     ECG Results          EKG 12-lead (Final result)  Result time 05/15/22 09:05:30    Final result by Interface, Lab In [facility] (05/15/22 09:05:30)                 Narrative:    Test Reason :     Vent. Rate : 083 BPM     Atrial Rate : 083 BPM     P-R Int : 148 ms          QRS Dur : 084 ms      QT Int : 352 ms       P-R-T  "Axes : 053 014 046 degrees     QTc Int : 413 ms    Normal sinus rhythm  Normal ECG  When compared with ECG of 25-DEC-2021 01:49,  T wave inversion no longer evident in Inferior leads  Confirmed by Jorge ALBERT MD ([id number]) on 5/15/2022 9:05:24 AM    Referred By: ELENA NOLAND           Confirmed By:Jorge ALBERT MD                            Imaging Results    None          Medications   sodium chloride 0.9% flush 10 mL (has no administration in time range)   levothyroxine tablet 100 mcg (has no administration in time range)   losartan tablet 100 mg (has no administration in time range)   glucose chewable tablet 16 g (has no administration in time range)   glucose chewable tablet 24 g (has no administration in time range)   glucagon (human recombinant) injection 1 mg (has no administration in time range)   dextrose 10% bolus 125 mL (has no administration in time range)   dextrose 10% bolus 250 mL (has no administration in time range)   insulin aspart U-100 pen 0-5 Units (has no administration in time range)   dextrose 10% bolus 125 mL (has no administration in time range)   dextrose 10% bolus 250 mL (has no administration in time range)     Medical Decision Making:   History:   Old Medical Records: I decided to obtain old medical records.  Old Records Summarized: records from clinic visits and records from previous admission(s).       <> Summary of Records: CT at OSH: "Findings highly concerning for a developing mass at the left base of the tongue enhancing 2.1 cm region"  Initial Assessment:   71-year-old male who appears to be in no acute distress presents to the ED as a transfer due to hemoptysis and dysphagia.  Patient is unable to phonate due to laryngectomy, however, he has a tracheostomy stoma in place.  Breath sounds are equal bilaterally and distal pulses are present.  Physical exam is remarkable for blood clots in the oropharynx, however, was unable to visualize malignancy at the base at the time.  Differential " Diagnosis:   Malignancy  Electrolyte abnormality  Anemia  Airway obstruction  Independently Interpreted Test(s):   I have ordered and independently interpreted EKG Reading(s) - see prior notes  Clinical Tests:   Lab Tests: Ordered and Reviewed  Medical Tests: Ordered and Reviewed  ED Management:  I ordered type and screen and a CBC to evaluate for progressive anemia due to active bleeding.  ENT was consulted and they stated that they will come down to see patient.  Patient appears stable upon reassessment without any obvious signs of airway compromise.  They admitted the patient for oral bleeding and monitor for resolution of the hemoptysis.  Other:   I have discussed this case with another health care provider.       <> Summary of the Discussion: ENT evaluated pt at bedside and will admit pt            Attending Attestation:   Physician Attestation Statement for Resident:  As the supervising MD   Physician Attestation Statement: I have personally seen and examined this patient.   I agree with the above history. -:   As the supervising MD I agree with the above PE.   -: NAD, O2 sat 100%, no stridor, mary alice blood and clots at posterior oropharynx   As the supervising MD I agree with the above treatment, course, plan, and disposition.  I have reviewed and agree with the residents interpretation of the following: lab data.  I have reviewed the following: old records at this facility and records from a referring facility.                         Clinical Impression:   Final diagnoses:  [K13.79] Oral bleeding (Primary)          ED Disposition Condition    Admit               Narinder Dumont MD  Resident  05/15/22 3572       Asia Ryder MD  05/16/22 0364

## 2022-05-15 NOTE — ED PROVIDER NOTES
Encounter Date: 5/15/2022       History     Chief Complaint   Patient presents with    Throat Bleeding     Started at approx midnight. 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  Patient now has a neoplasm at the base of the tongue  (2.1 cm on 4/23/22 CT of the soft tissues of the neck) biopsied on 4/27/22 and is seeing Dr. James ENT at [facility].  He also has history of AFib and is on Eliquis, type 2 diabetes, hypertension [facility]  He reports he has been spitting up approximately 4 cc of blood occasionally with clots 4-5 times per hour.  He reports minimal pain.  Due to complete laryngectomy with a tracheostomy he is not having any difficulty breathing.  No bleeding from stoma.  He reports no difficulty with swallowing.  He is having some nausea from swallowing the blood.  He does not feel weak or dizzy.        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     Past Surgical History:   Procedure Laterality Date    DIRECT LARYNGOBRONCHOSCOPY N/A 12/27/2021    Procedure: LARYNGOSCOPY, DIRECT, WITH BRONCHOSCOPY;  Surgeon: Flex Espinosa MD;  Location: [address];  Service: ENT;  Laterality: N/A;    DISSECTION OF NECK Bilateral 1/6/2022    Procedure: DISSECTION, NECK;  Surgeon: Jesse James MD;  Location: [address];  Service: ENT;  Laterality: Bilateral;    FLAP PROCEDURE Right 1/6/2022    Procedure: CREATION, FREE FLAP;  Surgeon: Alise Hart MD;  Location: [address]  FLR;  Service: ENT;  Laterality: Right;  Ischemic start 1351  Ischemic stop 1502    LARYNGECTOMY N/A 1/6/2022    Procedure: LARYNGECTOMY;  Surgeon: Jesse James MD;  [facility] OR 2ND FLR;  Service: ENT;  Laterality: N/A;    LARYNGOSCOPY N/A 8/4/2020    Procedure: Suspension microlaryngoscopy with biopsy, possible KTP laser treatment/excision;  Surgeon: Stew Noel MD;  Location: [facility] OR 2ND FLR;  Service: ENT;  Laterality: N/A;  Microscope, telescopes, tower, microinstruments, KTP laser, rep conf# [id number] IC 7/28.    LARYNGOSCOPY N/A 3/16/2021    Procedure: Suspension microlaryngoscopy with excision of lesion, possible CO2 laser;  Surgeon: Stew Noel MD;  Location: [facility] OR 2ND FLR;  Service: ENT;  Laterality: N/A;  Microscope, telescopes, tower, microinstruments, CO2 laser, rep conf# [id number] IC 3/4.    LARYNGOSCOPY N/A 4/1/2021    Procedure: Suspension microlaryngoscopy with KTP laser excision of lesion;  Surgeon: Stew Noel MD;  Location: [facility] OR 2ND FLR;  Service: ENT;  Laterality: N/A;  Microscope, telescopes, tower, microinstruments, 70 degree scope, vocal fold , KTP laser, rep conf# [id number] BC    LARYNGOSCOPY N/A 12/9/2021    Procedure: Suspension microlaryngoscopy with biopsy;  Surgeon: Stew Noel MD;  Location: [facility] OR 2ND FLR;  Service: ENT;  Laterality: N/A;  Microscope, telescopes, tower, microinstruments    LARYNGOSCOPY N/A 1/6/2022    Procedure: LARYNGOSCOPY;  Surgeon: Jesse James MD;  Location: [facility] OR 2ND FLR;  Service: ENT;  Laterality: N/A;    LARYNGOSCOPY N/A 4/27/2022    Procedure: LARYNGOSCOPY WITH BIOPSY;  Surgeon: Jesse James MD;  Location: [facility] OR 2ND FLR;  Service: ENT;  Laterality: N/A;    MICROLARYNGOSCOPY N/A 3/17/2020    Procedure: MICROLARYNGOSCOPY;  Surgeon: Jung Xiao MD;  Location: [facility];  Service: ENT;  Laterality: N/A;  Laser Microlaryngoscopy  NEED TO SCHEDULE LASER from [facility] [id number]  [id number]    REIMPLANTATION OF PARATHYROID TISSUE N/A 1/6/2022    Procedure: REIMPLANTATION, PARATHYROID TISSUE;  Surgeon: Jesse James MD;  [facility];  Service: ENT;  Laterality: N/A;    THYROIDECTOMY  1/6/2022    Procedure: THYROIDECTOMY;  Surgeon: Jesse James MD;  [facility];  Service: ENT;;    TRACHEOSTOMY N/A 12/27/2021    Procedure: CREATION, TRACHEOSTOMY;  Surgeon: Flex Espinosa MD;  [facility];  Service: ENT;  Laterality: N/A;     Family History   Problem Relation Age of Onset    Abnormal EKG Mother     Diabetes Father     Heart disease Father     Hypertension Father      Social History     Tobacco Use    Smoking status: Never Smoker    Smokeless tobacco: Never Used   Substance Use Topics    Alcohol use: Not Currently     Comment: occasional    Drug use: No     Review of Systems   Constitutional: Negative for fatigue.   HENT: Positive for sore throat. Negative for congestion, drooling, nosebleeds, postnasal drip, rhinorrhea, sinus pain and trouble swallowing.    Respiratory: Negative for cough and shortness of breath.    Cardiovascular: Negative for chest pain.   Gastrointestinal: Positive for nausea. Negative for abdominal pain.   Musculoskeletal: Negative for neck pain.   Skin: Negative for pallor.   Neurological: Negative for dizziness, weakness and light-headedness.   All other systems reviewed and are negative.      Physical Exam     Initial Vitals [05/15/22 0352]   BP Pulse Resp Temp SpO2   131/68 82 16 98.5 °F (36.9 °C) 100 %      MAP       --         Physical Exam    Nursing note and vitals reviewed.  Constitutional: He appears well-developed and well-nourished. He is not diaphoretic. No distress.   HENT:   Head: Normocephalic and atraumatic.   Right Ear: External ear normal.   Left Ear: External ear normal.   Nose: Nose normal.   Mouth/Throat: Oropharynx is clear and moist.   Moist mucous membranes.  Saliva mixed with blood at  the posterior pharynx that was suctioned out with a anger.  I am unable to see any source of bleeding.   Eyes: Conjunctivae and EOM are normal. Pupils are equal, round, and reactive to light.   Neck: Neck supple. No tracheal deviation present.   Tracheostomy site is clean dry and intact no signs of bleeding at trach   Normal range of motion.  Cardiovascular: Normal rate, regular rhythm, normal heart sounds and intact distal pulses. Exam reveals no gallop and no friction rub.    No murmur heard.  Pulmonary/Chest: Breath sounds normal. No stridor. No respiratory distress. He has no wheezes. He has no rhonchi. He has no rales. He exhibits no tenderness.   Abdominal: He exhibits no mass.   Musculoskeletal:         General: No edema. Normal range of motion.      Cervical back: Normal range of motion and neck supple.     Neurological: He is alert and oriented to person, place, and time. He has normal strength. No cranial nerve deficit or sensory deficit.   Skin: Skin is warm and dry. No rash noted. No erythema. No pallor.   Psychiatric: He has a normal mood and affect. His behavior is normal. Judgment and thought content normal.         ED Course   Procedures  Labs Reviewed   CBC W/ AUTO DIFFERENTIAL - Abnormal; Notable for the following components:       Result Value    RBC 3.65 (*)     Hemoglobin 9.4 (*)     Hematocrit 30.0 (*)     MCH 25.8 (*)     MCHC 31.3 (*)     RDW 14.8 (*)     Lymph # 0.8 (*)     Gran % 77.7 (*)     Lymph % 11.6 (*)     All other components within normal limits   COMPREHENSIVE METABOLIC PANEL - Abnormal; Notable for the following components:    CO2 22 (*)     Glucose 151 (*)     BUN 27 (*)     All other components within normal limits   PROTIME-INR - Abnormal; Notable for the following components:    PT 17.2 (*)     All other components within normal limits   SARS-COV-2 RNA AMPLIFICATION, QUAL   APTT   TYPE & SCREEN          Imaging Results    None          Medications - No data to  "display  Medical Decision Making:   History:   Old Medical Records: I decided to obtain old medical records.  Old Records Summarized: records from another hospital.       <> Summary of Records: CT Soft Tissue Neck With Contrast (Final result)  Result time 04/24/22 01:29:34      Final result by Daryl Campbell MD (04/24/22 01:29:34)    ------------------------  Laryngoscopy on 4/27/22 "At the base of tongue at the junction with the neopharynx, there was an exophytic friable mass.  Representative biopsies were taken.  Hemostasis was achieved with topical Afrin.  Palpation revealed involvement of the left base of tongue extending across the midline and extended the lateral pharyngeal wall.  I was able to place my finger into the esophageal inlet and cannulated with a 20 Israeli bougie confirming patency."                    Narrative:      CT neck with contrast     Comparison:  CT neck dated December 24, 2021     Additional pertinent history:  Lymphadenopathy with bleeding from the mouth with history of laryngectomy     TECHNIQUE:  Multiple axial images were obtained from the mid portion of the brain through the superior mediastinum with  IV contrast.  Coronal and sagittal reformatted images were obtained off the axial source data.  One of the following dose optimization techniques was utilized in the performance of this exam: Automated exposure control; adjustment of the mA and/or kV according to the patient's size; or use of an iterative  reconstruction technique.  Specific details can be referenced in the facility's radiology CT exam operational policy.     CONTRAST:  100 mL of Omnipaque 350     FINDINGS:     Visualized portions of the brain parenchyma:Negative     Parotid glands/submandibular glands/thyroid:  Patient status post thyroidectomy. Enhancement and decreased size of both submandibular glands likely related to previous radiation therapy. Normal appearance of the parotid glands.     Orbits:  " Negative     Paranasal sinuses:  Mild mucosal thickening involving both maxillary sinuses.     Parapharyngeal spaces: Negative     Nasopharynx/oropharynx/hypopharynx:  Negative     Tonsillar pillars:  Negative     Oral tongue/tongue base:  At the base of the tongue on the left there are findings of a heterogeneously enhancing mass measuring 2.1 cm highly concerning for a neoplastic process.     True and false cords:  Patient status post laryngectomy which has been performed in the interim. Soft tissue stranding in the lower neck likely related to a previous flat reconstruction. No enhancement or lymphadenopathy within the lower neck.     Lymph node assessment:Negative     Surrounding soft tissues:  Negative     Vasculature:  Negative     Osseous structures:  Negative     Lung apices:  Scarring involving the lung apices bilaterally likely related to previous radiation.     IMPRESSION:  1. Postoperative and radiation changes involving the lower neck.  2. Findings highly concerning for a developing mass at the left base of the tongue enhancing 2.1 cm region. Direct visualization in this region would be of benefit.             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  Patient now has a neoplasm at the base of the tongue  (2.1 cm on 4/23/22 CT of the soft tissues of the neck) biopsied on 4/27/22 and is seeing Dr. James ENT at [facility].  He also has history of AFib and is on Eliquis, type 2 diabetes, hypertension who presents to the ER tonight due to spitting up blood for the past 4 hours.  He reports he has been spitting up approximately 4 cc of blood occasionally with clots 4-5 times per hour.  He reports minimal pain.  Due to complete  laryngectomy with a tracheostomy he is not having any difficulty breathing.  No bleeding from stoma.  He reports no difficulty with swallowing.  He is having some nausea from swallowing the blood.  He does not feel weak or dizzy.  On physical exam vital signs are stable sats are 100% on room air respirations 16 blood pressure 131/68 pulse 82 afebrile 98.5 patient is sitting in bed in no distress.  Patient is nonverbal and whispers his answers due to the complete laryngectomy.  On physical exam he has moist mucous membranes.  And at the posterior pharynx there is saliva mixed with bloody sputum.  He suction this out with a anger suction that was provided no other able to see the source of bleeding or any signs of the mass that was diagnosed 2 weeks ago.  I reviewed the recent nose recent laryngoscopy on April 27th.  Will do lab work here in a COVID test I will discuss with ENT recommendations prior to disposition.  Anne Jacobs M.D.  4:33 AM 5/15/2022  When labs returned I contacted [facility] and spoke with the ENT physician on-call, Dr. James, the [facility] personal ENT.  I have explained to him that the patient is now bringing blood from the base of the tongue cancer.  He is stable at this time with normal vital signs.  But is still on Eliquis.  He is bringing up a small amount of blood continuously approximately 4 cc 4-5 times per hour.  He does not feel weak or dizzy.  His H&H has decreased to 9.4 and 30 compared to 11 and 35 on April 23rd.  Coags are normal.  His preference was for the patient to be intubated and sedated in order to have oropharynx packed to tamponade the bleeding but the patient was not agreeable with this plan.  He also wanted the patient to be flown by helicopter to [facility] but the patient also refused transfer by helicopter but is agreeable with going by stat transfer by ambulance.  The patient is stable at this time but I have reiterated to him that had any point in time he could  have increased bleeding and this could become a life-threatening situation.  He reports that he understands this.  A 2nd large-bore IV will be placed and a type and screen will be performed.  [facility] is working on a stat transfer from our facility to their ER.  Anne Jacobs M.D.  6:11 AM 5/15/2022    Other:   I have discussed this case with another health care provider.                      Clinical Impression:   Final diagnoses:  [C32.9] Larynx cancer  [D64.9] Anemia, unspecified type  [K13.79] Oral bleeding (Primary)  [C02.9] Tongue cancer          ED Disposition Condition    Transfer to Another Facility Stable              Anne Jacobs MD  05/15/22 0613

## 2022-05-15 NOTE — PLAN OF CARE
Patient arrived on unit from ER in stable condition, denies pain or discomfort, no bleeding present at this time, AOX4, laryngectomy stoma present, no other skin issues, patient sats 100% on RA, unable to speak, but mouth words and writes to communicate, ambulates in room independently,  blood sugar 144, VSS, tolerating full liquid diet, NPO after midnight tonight, wife at bedside, call light and belongings in reach, bed in lowest position, will continue to monitor.    Problem: Adult Inpatient Plan of Care  Goal: Plan of Care Review  Outcome: Ongoing, Progressing  Goal: Patient-Specific Goal (Individualized)  Outcome: Ongoing, Progressing  Goal: Absence of Hospital-Acquired Illness or Injury  Outcome: Ongoing, Progressing  Goal: Optimal Comfort and Wellbeing  Outcome: Ongoing, Progressing  Goal: Readiness for Transition of Care  Outcome: Ongoing, Progressing     Problem: Diabetes Comorbidity  Goal: Blood Glucose Level Within Targeted Range  Outcome: Ongoing, Progressing

## 2022-05-15 NOTE — SUBJECTIVE & OBJECTIVE
"Medications:  Continuous Infusions:  Scheduled Meds:  PRN Meds:     Current Facility-Administered Medications on File Prior to Encounter   Medication    lactated ringers infusion     Current Outpatient Medications on File Prior to Encounter   Medication Sig    apixaban (ELIQUIS) 5 mg Tab Take 1 tablet (5 mg total) by mouth 2 (two) times daily.    aspirin (ECOTRIN) 81 MG EC tablet Take 1 tablet by mouth Daily.    blood sugar diagnostic (BLOOD GLUCOSE TEST) Strp 1 strip by Misc.(Non-Drug; Combo Route) route 2 (two) times daily before meals.    ciclopirox (LOPROX) 0.77 % Crea Apply topically 2 (two) times daily. (Patient not taking: Reported on 5/10/2022)    ergocalciferol (ERGOCALCIFEROL) 50,000 unit Cap TAKE 1 CAPSULE TWICE WEEKLY (Patient not taking: Reported on 5/10/2022)    ipratropium (ATROVENT) 21 mcg (0.03 %) nasal spray 2 sprays by Nasal route 2 (two) times daily. (Patient not taking: Reported on 5/10/2022)    levothyroxine (SYNTHROID) 100 MCG tablet Take 1 tablet (100 mcg total) by mouth before breakfast.    losartan (COZAAR) 100 MG tablet TAKE 1 TABLET BY MOUTH EVERY DAY (Patient taking differently: Take 100 mg by mouth every evening.)    pen needle, diabetic (BD ULTRA-FINE YULISSA PEN NEEDLE) 32 gauge x 5/32" Ndle Use once weekly.       Review of patient's allergies indicates:   Allergen Reactions    Pollen extracts     Lovastatin Rash     Not confirmed but pt skeptical       Past Medical History:   Diagnosis Date    Allergy     pollen extracts    Atrial fibrillation     Chronic anticoagulation     Diabetes mellitus, type 2     Hypertension     Larynx neoplasm malignant 8/4/2020     Past Surgical History:   Procedure Laterality Date    DIRECT LARYNGOBRONCHOSCOPY N/A 12/27/2021    Procedure: LARYNGOSCOPY, DIRECT, WITH BRONCHOSCOPY;  Surgeon: Flex Espinosa MD;  Location: [facility] OR [address] [place] [address];  Service: ENT;  Laterality: N/A;    DISSECTION OF NECK Bilateral 1/6/2022    Procedure: DISSECTION, NECK;  Surgeon: " Jesse James MD;  [facility];  Service: ENT;  Laterality: Bilateral;    FLAP PROCEDURE Right 1/6/2022    Procedure: CREATION, FREE FLAP;  Surgeon: Alise Hart MD;  Location: [facility] OR [facility] FLR;  Service: ENT;  Laterality: Right;  Ischemic start 1351  Ischemic stop 1502    LARYNGECTOMY N/A 1/6/2022    Procedure: LARYNGECTOMY;  Surgeon: Jesse James MD;  Location: [facility] OR [facility] FLR;  Service: ENT;  Laterality: N/A;    LARYNGOSCOPY N/A 8/4/2020    Procedure: Suspension microlaryngoscopy with biopsy, possible KTP laser treatment/excision;  Surgeon: Stew Noel MD;  [facility];  Service: ENT;  Laterality: N/A;  Microscope, telescopes, tower, microinstruments, KTP laser, rep conf# [id number] IC 7/28.    LARYNGOSCOPY N/A 3/16/2021    Procedure: Suspension microlaryngoscopy with excision of lesion, possible CO2 laser;  Surgeon: Stew Noel MD;  [facility];  Service: ENT;  Laterality: N/A;  Microscope, telescopes, tower, microinstruments, CO2 laser, rep conf# [id number] IC 3/4.    LARYNGOSCOPY N/A 4/1/2021    Procedure: Suspension microlaryngoscopy with KTP laser excision of lesion;  Surgeon: Stew Noel MD;  [facility];  Service: ENT;  Laterality: N/A;  Microscope, telescopes, tower, microinstruments, 70 degree scope, vocal fold , KTP laser, rep conf# [id number] BC    LARYNGOSCOPY N/A 12/9/2021    Procedure: Suspension microlaryngoscopy with biopsy;  Surgeon: Stew Noel MD;  [facility];  Service: ENT;  Laterality: N/A;  Microscope, telescopes, tower, microinstruments    LARYNGOSCOPY N/A 1/6/2022    Procedure: LARYNGOSCOPY;  Surgeon: Jesse James MD;  [facility];  Service: ENT;  Laterality: N/A;    LARYNGOSCOPY N/A 4/27/2022    Procedure: LARYNGOSCOPY WITH BIOPSY;  Surgeon: Jesse James MD;  Location: [facility] OR [address] [place] [address];  Service: ENT;  Laterality: N/A;    MICROLARYNGOSCOPY N/A  3/17/2020    Procedure: MICROLARYNGOSCOPY;  Surgeon: Jung Xiao MD;  Location: [facility] OR;  Service: ENT;  Laterality: N/A;  Laser Microlaryngoscopy  NEED TO SCHEDULE LASER from [employer] [id number]    REIMPLANTATION OF PARATHYROID TISSUE N/A 1/6/2022    Procedure: REIMPLANTATION, PARATHYROID TISSUE;  Surgeon: Jesse James MD;  Location: [facility] OR [facility] FLR;  Service: ENT;  Laterality: N/A;    THYROIDECTOMY  1/6/2022    Procedure: THYROIDECTOMY;  Surgeon: Jesse James MD;  Location: [facility] OR 2ND FLR;  Service: ENT;;    TRACHEOSTOMY N/A 12/27/2021    Procedure: CREATION, TRACHEOSTOMY;  Surgeon: Flex Espinosa MD;  Location: [facility] OR [facility] FLR;  Service: ENT;  Laterality: N/A;     Family History       Problem Relation (Age of Onset)    Abnormal EKG Mother    Diabetes Father    Heart disease Father    Hypertension Father          Tobacco Use    Smoking status: Never Smoker    Smokeless tobacco: Never Used   Substance and Sexual Activity    Alcohol use: Not Currently     Comment: occasional    Drug use: No    Sexual activity: Yes     Partners: Female     Review of Systems   All other systems reviewed and are negative.  Objective:     Vital Signs (Most Recent):  Temp: 98.5 °F (36.9 °C) (05/15/22 0747)  Pulse: 81 (05/15/22 0903)  Resp: 20 (05/15/22 0802)  BP: 130/74 (05/15/22 0903)  SpO2: 100 % (05/15/22 0903) Vital Signs (24h Range):  Temp:  [98.4 °F (36.9 °C)-98.5 °F (36.9 °C)] 98.5 °F (36.9 °C)  Pulse:  [72-82] 81  Resp:  [16-20] 20  SpO2:  [98 %-100 %] 100 %  BP: (126-145)/(60-79) 130/74     Weight: 63.5 kg (140 lb)  Body mass index is 22.6 kg/m².        Physical Exam  Constitutional:       General: He is not in acute distress.     Appearance: Normal appearance.   HENT:      Head: Normocephalic and atraumatic.      Nose: Nose normal.      Mouth/Throat:      Mouth: Mucous membranes are moist.      Pharynx: Oropharynx is clear.      Comments: Clots within oropharynx, suctioned   Eyes:      Extraocular  Movements: Extraocular movements intact.   Neck:      Comments: Baseplate over stoma  No bleeding  Neck soft   Pulmonary:      Effort: No respiratory distress.      Breath sounds: No stridor.   Neurological:      Mental Status: He is alert.     Procedure: Flexible laryngoscopy  After explaining the procedure and obtaining verbal consent, the flexible laryngoscope was inserted into the nare and advanced to visualize the nasal cavity, nasopharynx, and the neopharynx. The scope was removed and the procedure terminated. The patient tolerated this procedure well without apparent complication.     OVERALL FINDINGS  Nasopharynx - the torus is clear. There are no lesions of the posterior wall.   Oropharynx - Blood clot retained within base of tongue, unable to clear with sips of water. However, no significant pooling of blood.   Visualized neopharynx appears healthy.     Significant Labs:  CBC:   Recent Labs   Lab 05/15/22  0828   WBC 6.69   RBC 3.51*   HGB 9.1*   HCT 28.4*      MCV 81*   MCH 25.9*   MCHC 32.0     CMP:   Recent Labs   Lab 05/15/22  0503   *   CALCIUM 9.1   ALBUMIN 4.0   PROT 7.1      K 4.2   CO2 22*      BUN 27*   CREATININE 1.0   ALKPHOS 82   ALT 15   AST 16   BILITOT 0.9     Coagulation:   Recent Labs   Lab 05/15/22  0503   INR 1.5   APTT 32.4       Significant Diagnostics:  None

## 2022-05-15 NOTE — HPI
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 Patient presents as transfer for 1 day history of hemoptysis. Patient reports he had been having worsening dysphagia 2/2 BOT malignancy but otherwise doing well until yesterday when he began coughing up bright red blood. Still able to tolerate PO. Denies any bleeding from stoma.

## 2022-05-15 NOTE — CONSULTS
[facility] - Emergency Dept  Otorhinolaryngology-Head & Neck Surgery  Consult Note    Patient Name: Cyrus Batres Jr.  MRN: [id number]  Code Status: Prior  Admission Date: 5/15/2022  Hospital Length of Stay: 0 days  Attending Physician: Asia Ryder MD  Primary Care Provider: Jesse James MD    Patient information was obtained from patient, past medical records and ER records.     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 Patient presents as transfer for 1 day history of hemoptysis. Patient reports he had been having worsening dysphagia 2/2 BOT malignancy but otherwise doing well until yesterday when he began coughing up bright red blood. Still able to tolerate PO. Denies any bleeding from stoma.       Medications:  Continuous Infusions:  Scheduled Meds:  PRN Meds:     Current Facility-Administered Medications on File Prior to Encounter   Medication    lactated ringers infusion     Current Outpatient Medications on File Prior to Encounter   Medication Sig    apixaban (ELIQUIS) 5 mg Tab Take 1 tablet (5 mg total) by mouth 2 (two) times daily.    aspirin (ECOTRIN) 81 MG EC tablet Take 1 tablet by mouth Daily.    blood sugar diagnostic (BLOOD GLUCOSE TEST) Strp 1 strip by Misc.(Non-Drug; Combo Route) route 2 (two) times daily before meals.    ciclopirox (LOPROX) 0.77 % Crea Apply topically 2 (two) times daily. (Patient not taking: Reported on 5/10/2022)    ergocalciferol (ERGOCALCIFEROL) 50,000 unit Cap TAKE 1 CAPSULE TWICE WEEKLY (Patient not taking: Reported on 5/10/2022)    ipratropium (ATROVENT) 21 mcg (0.03 %) nasal spray 2 sprays by Nasal route 2 (two) times daily. (Patient not taking: Reported on  "5/10/2022)    levothyroxine (SYNTHROID) 100 MCG tablet Take 1 tablet (100 mcg total) by mouth before breakfast.    losartan (COZAAR) 100 MG tablet TAKE 1 TABLET BY MOUTH EVERY DAY (Patient taking differently: Take 100 mg by mouth every evening.)    pen needle, diabetic (BD ULTRA-FINE YULISSA PEN NEEDLE) 32 gauge x 5/32" Ndle Use once weekly.       Review of patient's allergies indicates:   Allergen Reactions    Pollen extracts     Lovastatin Rash     Not confirmed but pt skeptical       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     Past Surgical History:   Procedure Laterality Date    DIRECT LARYNGOBRONCHOSCOPY N/A 12/27/2021    Procedure: LARYNGOSCOPY, DIRECT, WITH BRONCHOSCOPY;  Surgeon: Flex Espinosa MD;  Location: [facility] OR [address] [place] [address];  Service: ENT;  Laterality: N/A;    DISSECTION OF NECK Bilateral 1/6/2022    Procedure: DISSECTION, NECK;  Surgeon: Jesse James MD;  [facility];  Service: ENT;  Laterality: Bilateral;    FLAP PROCEDURE Right 1/6/2022    Procedure: CREATION, FREE FLAP;  Surgeon: Alise Hart MD;  [facility];  Service: ENT;  Laterality: Right;  Ischemic start 1351  Ischemic stop 1502    LARYNGECTOMY N/A 1/6/2022    Procedure: LARYNGECTOMY;  Surgeon: Jesse James MD;  Location: [facility] OR [address] [place] [address];  Service: ENT;  Laterality: N/A;    LARYNGOSCOPY N/A 8/4/2020    Procedure: Suspension microlaryngoscopy with biopsy, possible KTP laser treatment/excision;  Surgeon: Stew Noel MD;  Location: [facility] OR [address] [place] [address];  Service: ENT;  Laterality: N/A;  Microscope, telescopes, tower, microinstruments, KTP laser, rep conf# [id number] IC 7/28.    LARYNGOSCOPY N/A 3/16/2021    Procedure: Suspension microlaryngoscopy with excision of lesion, possible CO2 laser;  Surgeon: Stew Noel MD;  " Location: [facility] OR 2ND FLR;  Service: ENT;  Laterality: N/A;  Microscope, telescopes, tower, microinstruments, CO2 laser, rep conf# [id number] IC 3/4.    LARYNGOSCOPY N/A 4/1/2021    Procedure: Suspension microlaryngoscopy with KTP laser excision of lesion;  Surgeon: Stew Noel MD;  Location: [facility] OR [facility] FLR;  Service: ENT;  Laterality: N/A;  Microscope, telescopes, tower, microinstruments, 70 degree scope, vocal fold , KTP laser, rep conf# [id number] BC    LARYNGOSCOPY N/A 12/9/2021    Procedure: Suspension microlaryngoscopy with biopsy;  Surgeon: Stew Noel MD;  Location: [facility] OR [facility] FLR;  Service: ENT;  Laterality: N/A;  Microscope, telescopes, tower, microinstruments    LARYNGOSCOPY N/A 1/6/2022    Procedure: LARYNGOSCOPY;  Surgeon: Jesse James MD;  Location: [facility] OR [facility] FLR;  Service: ENT;  Laterality: N/A;    LARYNGOSCOPY N/A 4/27/2022    Procedure: LARYNGOSCOPY WITH BIOPSY;  Surgeon: Jesse James MD;  Location: [facility] OR [facility] FLR;  Service: ENT;  Laterality: N/A;    MICROLARYNGOSCOPY N/A 3/17/2020    Procedure: MICROLARYNGOSCOPY;  Surgeon: Jung Xiao MD;  Location: [facility] OR;  Service: ENT;  Laterality: N/A;  Laser Microlaryngoscopy  NEED TO SCHEDULE LASER from [facility] [id number]    REIMPLANTATION OF PARATHYROID TISSUE N/A 1/6/2022    Procedure: REIMPLANTATION, PARATHYROID TISSUE;  Surgeon: Jesse James MD;  Location: [facility] OR [facility] FLR;  Service: ENT;  Laterality: N/A;    THYROIDECTOMY  1/6/2022    Procedure: THYROIDECTOMY;  Surgeon: Jesse James MD;  Location: [facility] OR [facility] FLR;  Service: ENT;;    TRACHEOSTOMY N/A 12/27/2021    Procedure: CREATION, TRACHEOSTOMY;  Surgeon: Flex Espinosa MD;  Location: [facility] OR [facility] FLR;  Service: ENT;  Laterality: N/A;     Family History       Problem Relation (Age of Onset)    Abnormal EKG Mother    Diabetes Father    Heart disease Father    Hypertension Father          Tobacco Use    Smoking status: Never  Smoker    Smokeless tobacco: Never Used   Substance and Sexual Activity    Alcohol use: Not Currently     Comment: occasional    Drug use: No    Sexual activity: Yes     Partners: Female     Review of Systems   All other systems reviewed and are negative.  Objective:     Vital Signs (Most Recent):  Temp: 98.5 °F (36.9 °C) (05/15/22 0747)  Pulse: 81 (05/15/22 0903)  Resp: 20 (05/15/22 0802)  BP: 130/74 (05/15/22 0903)  SpO2: 100 % (05/15/22 0903) Vital Signs (24h Range):  Temp:  [98.4 °F (36.9 °C)-98.5 °F (36.9 °C)] 98.5 °F (36.9 °C)  Pulse:  [72-82] 81  Resp:  [16-20] 20  SpO2:  [98 %-100 %] 100 %  BP: (126-145)/(60-79) 130/74     Weight: 63.5 kg (140 lb)  Body mass index is 22.6 kg/m².        Physical Exam  Constitutional:       General: He is not in acute distress.     Appearance: Normal appearance.   HENT:      Head: Normocephalic and atraumatic.      Nose: Nose normal.      Mouth/Throat:      Mouth: Mucous membranes are moist.      Pharynx: Oropharynx is clear.      Comments: Clots within oropharynx, suctioned   Eyes:      Extraocular Movements: Extraocular movements intact.   Neck:      Comments: Baseplate over stoma  No bleeding  Neck soft   Pulmonary:      Effort: No respiratory distress.      Breath sounds: No stridor.   Neurological:      Mental Status: He is alert.     Procedure: Flexible laryngoscopy  After explaining the procedure and obtaining verbal consent, the flexible laryngoscope was inserted into the nare and advanced to visualize the nasal cavity, nasopharynx, and the neopharynx. The scope was removed and the procedure terminated. The patient tolerated this procedure well without apparent complication.     OVERALL FINDINGS  Nasopharynx - the torus is clear. There are no lesions of the posterior wall.   Oropharynx - Blood clot retained within base of tongue, unable to clear with sips of water. However, no significant pooling of blood.   Visualized neopharynx appears healthy.     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 Now with hemoptysis, likely 2/2 BOT malignancy. Laryngoscopy with evidence of retained clot but no significant active bleeding. INR elevated. Patient on eliquis.     - Admit to ENT   - Hold eliquis  - Will CTM INR   - OK for liquid diet, will make NPO at midnight   - If bleeding does not resolve, will plan for OR       VTE Risk Mitigation (From admission, onward)    None          Jo Ann Bryant MD  Otorhinolaryngology-Head & Neck Surgery  [facility] - Emergency Dept

## 2022-05-15 NOTE — ED NOTES
Pt resting in bed. No acute distress noted. RR even and unlabored. Remains using suction at bedside for oral secretions/blood.

## 2022-05-15 NOTE — ASSESSMENT & PLAN NOTE
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 Now with hemoptysis, likely 2/2 BOT malignancy. Laryngoscopy with evidence of retained clot but no significant active bleeding. INR elevated. Patient on eliquis.     - Admit to ENT   - Hold eliquis  - Will CTM INR   - OK for liquid diet, will make NPO at midnight   - If bleeding does not resolve, will plan for OR

## 2022-05-16 ENCOUNTER — TELEPHONE (OUTPATIENT)
Dept: RADIATION ONCOLOGY | Facility: CLINIC | Age: 72
End: 2022-05-16

## 2022-05-16 ENCOUNTER — PATIENT MESSAGE (OUTPATIENT)
Dept: SURGICAL ONCOLOGY | Facility: CLINIC | Age: 72
End: 2022-05-16
Payer: MEDICARE

## 2022-05-16 VITALS
HEIGHT: 66 IN | SYSTOLIC BLOOD PRESSURE: 114 MMHG | DIASTOLIC BLOOD PRESSURE: 69 MMHG | OXYGEN SATURATION: 100 % | HEART RATE: 88 BPM | WEIGHT: 140 LBS | RESPIRATION RATE: 18 BRPM | BODY MASS INDEX: 22.5 KG/M2 | TEMPERATURE: 98 F

## 2022-05-16 LAB
APTT BLDCRRT: 28.2 SEC (ref 21–32)
BASOPHILS # BLD AUTO: 0.02 K/UL (ref 0–0.2)
BASOPHILS NFR BLD: 0.3 % (ref 0–1.9)
DIFFERENTIAL METHOD: ABNORMAL
EOSINOPHIL # BLD AUTO: 0.2 K/UL (ref 0–0.5)
EOSINOPHIL NFR BLD: 2.5 % (ref 0–8)
ERYTHROCYTE [DISTWIDTH] IN BLOOD BY AUTOMATED COUNT: 14.3 % (ref 11.5–14.5)
HCT VFR BLD AUTO: 29.1 % (ref 40–54)
HGB BLD-MCNC: 9 G/DL (ref 14–18)
IMM GRANULOCYTES # BLD AUTO: 0.02 K/UL (ref 0–0.04)
IMM GRANULOCYTES NFR BLD AUTO: 0.3 % (ref 0–0.5)
INR PPP: 1.1 (ref 0.8–1.2)
LYMPHOCYTES # BLD AUTO: 0.7 K/UL (ref 1–4.8)
LYMPHOCYTES NFR BLD: 10.3 % (ref 18–48)
MCH RBC QN AUTO: 25.3 PG (ref 27–31)
MCHC RBC AUTO-ENTMCNC: 30.9 G/DL (ref 32–36)
MCV RBC AUTO: 82 FL (ref 82–98)
MONOCYTES # BLD AUTO: 0.5 K/UL (ref 0.3–1)
MONOCYTES NFR BLD: 6.6 % (ref 4–15)
NEUTROPHILS # BLD AUTO: 5.5 K/UL (ref 1.8–7.7)
NEUTROPHILS NFR BLD: 80 % (ref 38–73)
NRBC BLD-RTO: 0 /100 WBC
PLATELET # BLD AUTO: 242 K/UL (ref 150–450)
PMV BLD AUTO: 10.9 FL (ref 9.2–12.9)
POCT GLUCOSE: 140 MG/DL (ref 70–110)
POCT GLUCOSE: 146 MG/DL (ref 70–110)
POCT GLUCOSE: 190 MG/DL (ref 70–110)
PROTHROMBIN TIME: 11 SEC (ref 9–12.5)
RBC # BLD AUTO: 3.56 M/UL (ref 4.6–6.2)
WBC # BLD AUTO: 6.86 K/UL (ref 3.9–12.7)

## 2022-05-16 PROCEDURE — G0378 HOSPITAL OBSERVATION PER HR: HCPCS

## 2022-05-16 PROCEDURE — 36415 COLL VENOUS BLD VENIPUNCTURE: CPT | Performed by: STUDENT IN AN ORGANIZED HEALTH CARE EDUCATION/TRAINING PROGRAM

## 2022-05-16 PROCEDURE — 25000003 PHARM REV CODE 250: Performed by: STUDENT IN AN ORGANIZED HEALTH CARE EDUCATION/TRAINING PROGRAM

## 2022-05-16 PROCEDURE — 85025 COMPLETE CBC W/AUTO DIFF WBC: CPT | Performed by: STUDENT IN AN ORGANIZED HEALTH CARE EDUCATION/TRAINING PROGRAM

## 2022-05-16 PROCEDURE — 85610 PROTHROMBIN TIME: CPT | Performed by: STUDENT IN AN ORGANIZED HEALTH CARE EDUCATION/TRAINING PROGRAM

## 2022-05-16 PROCEDURE — 85730 THROMBOPLASTIN TIME PARTIAL: CPT | Performed by: STUDENT IN AN ORGANIZED HEALTH CARE EDUCATION/TRAINING PROGRAM

## 2022-05-16 RX ORDER — ACETAMINOPHEN 325 MG/1
650 TABLET ORAL EVERY 6 HOURS PRN
Status: DISCONTINUED | OUTPATIENT
Start: 2022-05-16 | End: 2022-05-16 | Stop reason: HOSPADM

## 2022-05-16 RX ADMIN — LEVOTHYROXINE SODIUM 100 MCG: 50 TABLET ORAL at 06:05

## 2022-05-16 RX ADMIN — ACETAMINOPHEN 650 MG: 325 TABLET ORAL at 09:05

## 2022-05-16 NOTE — NURSING
Pt Aox4. VSS. No signs of respiratory distress on RA. All IV access removed. Pt received and reviewed discharge instructions. Pt remained injury free through shift. Pt refused wheelchair and walked to exit. Pt discharge to home via private vehicle.

## 2022-05-16 NOTE — PLAN OF CARE
[facility]  Initial Discharge Assessment       Primary Care Provider: Jesse James MD    Admission Diagnosis: Oral bleeding [K13.79]    Admission Date: 5/15/2022  Expected Discharge Date: 5/18/2022    Discharge Barriers Identified: None    Payor: [employer] MANAGED MEDICARE / Plan: [employer] MEDICARE HMO / Product Type: Capitation /     Extended Emergency Contact Information  Primary Emergency Contact: Janel Batres  Address: [address] [place] [address]           [place] [address] [place]  Mobile Phone: [phone]  Relation: Spouse  Preferred language: English   needed? No    Discharge Plan A: Home with family  Discharge Plan B: Home Health      [facility] - [place], [place] - [address]  [address]  [facility] [address]  Phone: [phone] Fax: [phone]    [id number] - [place] - [address]  [address] [address]  Phone: [phone] Fax: [phone]      Initial Assessment (most recent)     Adult Discharge Assessment - 05/16/22 1117        Discharge Assessment    Assessment Type Discharge Planning Assessment     Source of Information patient;family     Communicated CHIARA with patient/caregiver Yes     Lives With spouse     Do you expect to return to your current living situation? Yes     Do you have help at home or someone to help you manage your care at home? Yes     Prior to hospitilization cognitive status: Alert/Oriented     Current cognitive status: Alert/Oriented     Walking or Climbing Stairs Difficulty none     Dressing/Bathing Difficulty none     Equipment Currently Used at Home respiratory supplies   Suction machine    Readmission within 30 days? No     Do you currently have service(s) that help you manage your care at home? No     Do you take prescription medications? Yes     Do you have prescription coverage? Yes     Do you have any problems affording any of your prescribed medications? No      Is the patient taking medications as prescribed? yes     Who is going to help you get home at discharge? Spouse     Are you on dialysis? No     Do you take coumadin? No     Discharge Plan A Home with family     Discharge Plan B Home Health     DME Needed Upon Discharge  none     Discharge Plan discussed with: Patient;Spouse/sig other     Discharge Barriers Identified None                  Korin Blancas RN, CM   Ext: [phone]

## 2022-05-16 NOTE — ASSESSMENT & PLAN NOTE
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 Now with hemoptysis, likely 2/2 BOT malignancy. Laryngoscopy with evidence of retained clot but no significant active bleeding.    Has not had bleeding since yesterday.      - Continue to hold eliquis  - Will CTM INR   - Will restart diet this AM   - If bleeding recurs, will plan for OR

## 2022-05-16 NOTE — DISCHARGE SUMMARY
"[facility]  Discharge Summary      Admit Date: 5/15/2022    Discharge Date and Time:  05/16/2022 4:00 PM    Attending Physician: Jesse James MD     Reason for Admission: Observation    Procedures Performed: * No surgery found *    Hospital Course (synopsis of major diagnoses, care, treatment, and services provided during the course of the hospital stay): Patient admitted for observation given bleeding from oropharynx. He remained stable throughout hospitalization. No further evidence of bleeding. H&H stable. He is tolerating PO without issue. Patient stable for discharge home with close ENT follow up.     Goals of Care Treatment Preferences:  Code Status: Full Code      Consults: none    Significant Diagnostic Studies: None    Final Diagnoses:    Principal Problem: Hemoptysis   Secondary Diagnoses:   Active Hospital Problems    Diagnosis  POA    *Hemoptysis [R04.2]  Yes      Resolved Hospital Problems   No resolved problems to display.       Discharged Condition: good    Disposition: Home or Self Care    Follow Up/Patient Instructions:     Medications:  Reconciled Home Medications:      Medication List      CHANGE how you take these medications    losartan 100 MG tablet  Commonly known as: COZAAR  TAKE 1 TABLET BY MOUTH EVERY DAY  What changed: when to take this        CONTINUE taking these medications    apixaban 5 mg Tab  Commonly known as: ELIQUIS  Take 1 tablet (5 mg total) by mouth 2 (two) times daily.     aspirin 81 MG EC tablet  Commonly known as: ECOTRIN  Take 1 tablet by mouth Daily.     blood sugar diagnostic Strp  Commonly known as: BLOOD GLUCOSE TEST  1 strip by Misc.(Non-Drug; Combo Route) route 2 (two) times daily before meals.     esomeprazole 20 MG capsule  Commonly known as: NEXIUM  Take 20 mg by mouth once daily.     levothyroxine 100 MCG tablet  Commonly known as: SYNTHROID  Take 1 tablet (100 mcg total) by mouth before breakfast.     pen needle, diabetic 32 gauge x 5/32" " Ndle  Commonly known as: BD ULTRA-FINE YULISSA PEN NEEDLE  Use once weekly.        STOP taking these medications    ciclopirox 0.77 % Crea  Commonly known as: LOPROX     ergocalciferol 50,000 unit Cap  Commonly known as: ERGOCALCIFEROL     ipratropium 21 mcg (0.03 %) nasal spray  Commonly known as: ATROVENT          Discharge Procedure Orders   Diet full liquid     No dressing needed     Activity as tolerated      Follow-up Information     Jesse James MD. Go on 6/7/2022.    Specialty: Otolaryngology  Why: 2 PM appointment  Contact information:  [facility] [address]  [phone]

## 2022-05-16 NOTE — SUBJECTIVE & OBJECTIVE
Interval History: NAEON. Patient reports he has not had any bleeding since yesterday.     Medications:  Continuous Infusions:  Scheduled Meds:   levothyroxine  100 mcg Oral Before breakfast    losartan  100 mg Oral QHS     PRN Meds:acetaminophen, dextrose 10%, dextrose 10%, dextrose 10%, dextrose 10%, glucagon (human recombinant), glucose, glucose, insulin aspart U-100, sodium chloride 0.9%     Review of patient's allergies indicates:   Allergen Reactions    Pollen extracts     Lovastatin Rash     Not confirmed but pt skeptical     Objective:     Vital Signs (24h Range):  Temp:  [96.5 °F (35.8 °C)-98.2 °F (36.8 °C)] 98.2 °F (36.8 °C)  Pulse:  [71-85] 77  Resp:  [16-18] 18  SpO2:  [95 %-100 %] 100 %  BP: (107-137)/(65-72) 107/65       Lines/Drains/Airways       Drain  Duration                  Gastrostomy/Enterostomy 12/27/21 1152 Percutaneous endoscopic gastrostomy (PEG)  days              Airway  Duration                  Laryngectomy (Do Not Remove) -- days         Airway - Non-Surgical 04/27/22 1410 18 days              Peripheral Intravenous Line  Duration                  Peripheral IV - Single Lumen 05/15/22 0503 20 G Left Antecubital 1 day         Peripheral IV - Single Lumen 05/15/22 0624 20 G Right Antecubital 1 day                    Physical Exam  Constitutional:       General: He is not in acute distress.     Appearance: Normal appearance.   HENT:      Head: Normocephalic and atraumatic.      Nose: Nose normal.      Mouth/Throat:      Mouth: Mucous membranes are moist.      Pharynx: Oropharynx is clear.      Comments: Clots within oropharynx, suctioned   Eyes:      Extraocular Movements: Extraocular movements intact.   Neck:      Comments: Baseplate over stoma  No bleeding  Neck soft   Pulmonary:      Effort: No respiratory distress.      Breath sounds: No stridor.   Neurological:      Mental Status: He is alert.     Significant Labs:  CBC:   Recent Labs   Lab 05/16/22  0543   WBC 6.86   RBC 3.56*    HGB 9.0*   HCT 29.1*      MCV 82   MCH 25.3*   MCHC 30.9*     Coagulation:   Recent Labs   Lab 05/16/22  0543   LABPROT 11.0   INR 1.1   APTT 28.2       Significant Diagnostics:  None   DVT (deep venous thrombosis)

## 2022-05-16 NOTE — PLAN OF CARE
Pt is A&Ox4 injury free. Breathing is regular and equal through the Paul tube on room air. Pt denies pain during night shift. Pt was NPO @ 0000 5/16/2022. Pt communicates by righting. The pt is independent to bathroom and walking in the rothman.

## 2022-05-16 NOTE — PROGRESS NOTES
[facility] - [facility]  Otorhinolaryngology-Head & Neck Surgery  Progress Note    Subjective:     Post-Op Info:  * No surgery found *      Hospital Day: 2     Interval History: NAEON. Patient reports he has not had any bleeding since yesterday.     Medications:  Continuous Infusions:  Scheduled Meds:   levothyroxine  100 mcg Oral Before breakfast    losartan  100 mg Oral QHS     PRN Meds:acetaminophen, dextrose 10%, dextrose 10%, dextrose 10%, dextrose 10%, glucagon (human recombinant), glucose, glucose, insulin aspart U-100, sodium chloride 0.9%     Review of patient's allergies indicates:   Allergen Reactions    Pollen extracts     Lovastatin Rash     Not confirmed but pt skeptical     Objective:     Vital Signs (24h Range):  Temp:  [96.5 °F (35.8 °C)-98.2 °F (36.8 °C)] 98.2 °F (36.8 °C)  Pulse:  [71-85] 77  Resp:  [16-18] 18  SpO2:  [95 %-100 %] 100 %  BP: (107-137)/(65-72) 107/65       Lines/Drains/Airways       Drain  Duration                  Gastrostomy/Enterostomy 12/27/21 1152 Percutaneous endoscopic gastrostomy (PEG)  days              Airway  Duration                  Laryngectomy (Do Not Remove) -- days         Airway - Non-Surgical 04/27/22 1410 18 days              Peripheral Intravenous Line  Duration                  Peripheral IV - Single Lumen 05/15/22 0503 20 G Left Antecubital 1 day         Peripheral IV - Single Lumen 05/15/22 0624 20 G Right Antecubital 1 day                    Physical Exam  Constitutional:       General: He is not in acute distress.     Appearance: Normal appearance.   HENT:      Head: Normocephalic and atraumatic.      Nose: Nose normal.      Mouth/Throat:      Mouth: Mucous membranes are moist.      Pharynx: Oropharynx is clear.      Comments: Clots within oropharynx, suctioned   Eyes:      Extraocular Movements: Extraocular movements intact.   Neck:      Comments: Baseplate over stoma  No bleeding  Neck soft   Pulmonary:      Effort: No respiratory distress.       Breath sounds: No stridor.   Neurological:      Mental Status: He is alert.     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 Now with hemoptysis, likely 2/2 BOT malignancy. Laryngoscopy with evidence of retained clot but no significant active bleeding.    Has not had bleeding since yesterday.      - Continue to hold eliquis  - Will CTM INR   - Will restart diet this AM   - If bleeding recurs, will plan for OR         Jo Ann Bryant MD  Otorhinolaryngology-Head & Neck Surgery  [facility]

## 2022-05-17 ENCOUNTER — PATIENT MESSAGE (OUTPATIENT)
Dept: HEMATOLOGY/ONCOLOGY | Facility: CLINIC | Age: 72
End: 2022-05-17
Payer: MEDICARE

## 2022-05-17 ENCOUNTER — PATIENT MESSAGE (OUTPATIENT)
Dept: OTOLARYNGOLOGY | Facility: CLINIC | Age: 72
End: 2022-05-17
Payer: MEDICARE

## 2022-05-17 ENCOUNTER — PATIENT MESSAGE (OUTPATIENT)
Dept: SPEECH THERAPY | Facility: HOSPITAL | Age: 72
End: 2022-05-17
Payer: MEDICARE

## 2022-05-17 ENCOUNTER — PATIENT MESSAGE (OUTPATIENT)
Dept: CARDIOLOGY | Facility: CLINIC | Age: 72
End: 2022-05-17
Payer: MEDICARE

## 2022-05-18 ENCOUNTER — PATIENT MESSAGE (OUTPATIENT)
Dept: RADIATION ONCOLOGY | Facility: CLINIC | Age: 72
End: 2022-05-18

## 2022-05-18 ENCOUNTER — TELEPHONE (OUTPATIENT)
Dept: CARDIOLOGY | Facility: CLINIC | Age: 72
End: 2022-05-18
Payer: MEDICARE

## 2022-05-19 ENCOUNTER — PATIENT MESSAGE (OUTPATIENT)
Dept: RADIATION ONCOLOGY | Facility: CLINIC | Age: 72
End: 2022-05-19

## 2022-05-19 ENCOUNTER — PATIENT MESSAGE (OUTPATIENT)
Dept: CARDIOLOGY | Facility: CLINIC | Age: 72
End: 2022-05-19
Payer: MEDICARE

## 2022-05-19 ENCOUNTER — HOSPITAL ENCOUNTER (EMERGENCY)
Facility: HOSPITAL | Age: 72
Discharge: ANOTHER HOSPITAL | End: 2022-05-20
Attending: EMERGENCY MEDICINE
Payer: MEDICARE

## 2022-05-19 DIAGNOSIS — C02.9 SQUAMOUS CELL CANCER OF TONGUE: ICD-10-CM

## 2022-05-19 DIAGNOSIS — K13.79 ORAL BLEEDING: Primary | ICD-10-CM

## 2022-05-19 PROCEDURE — 85610 PROTHROMBIN TIME: CPT | Performed by: STUDENT IN AN ORGANIZED HEALTH CARE EDUCATION/TRAINING PROGRAM

## 2022-05-19 PROCEDURE — 99285 EMERGENCY DEPT VISIT HI MDM: CPT

## 2022-05-19 PROCEDURE — 85025 COMPLETE CBC W/AUTO DIFF WBC: CPT | Performed by: STUDENT IN AN ORGANIZED HEALTH CARE EDUCATION/TRAINING PROGRAM

## 2022-05-19 PROCEDURE — 80053 COMPREHEN METABOLIC PANEL: CPT | Performed by: STUDENT IN AN ORGANIZED HEALTH CARE EDUCATION/TRAINING PROGRAM

## 2022-05-20 ENCOUNTER — ANESTHESIA EVENT (OUTPATIENT)
Dept: INTERVENTIONAL RADIOLOGY/VASCULAR | Facility: HOSPITAL | Age: 72
End: 2022-05-20
Payer: MEDICARE

## 2022-05-20 ENCOUNTER — HOSPITAL ENCOUNTER (OUTPATIENT)
Facility: HOSPITAL | Age: 72
LOS: 1 days | Discharge: HOME | End: 2022-05-23
Attending: EMERGENCY MEDICINE | Admitting: OTOLARYNGOLOGY
Payer: MEDICARE

## 2022-05-20 VITALS
OXYGEN SATURATION: 100 % | TEMPERATURE: 99 F | RESPIRATION RATE: 19 BRPM | DIASTOLIC BLOOD PRESSURE: 68 MMHG | WEIGHT: 140 LBS | HEART RATE: 81 BPM | SYSTOLIC BLOOD PRESSURE: 126 MMHG | BODY MASS INDEX: 22.5 KG/M2 | HEIGHT: 66 IN

## 2022-05-20 DIAGNOSIS — K13.79 ORAL BLEEDING: Primary | ICD-10-CM

## 2022-05-20 LAB
ABO + RH BLD: NORMAL
ALBUMIN SERPL BCP-MCNC: 4 G/DL (ref 3.5–5.2)
ALBUMIN SERPL BCP-MCNC: 4 G/DL (ref 3.5–5.2)
ALP SERPL-CCNC: 76 U/L (ref 55–135)
ALP SERPL-CCNC: 98 U/L (ref 55–135)
ALT SERPL W/O P-5'-P-CCNC: 11 U/L (ref 10–44)
ALT SERPL W/O P-5'-P-CCNC: 8 U/L (ref 10–44)
ANION GAP SERPL CALC-SCNC: 9 MMOL/L (ref 8–16)
ANION GAP SERPL CALC-SCNC: 9 MMOL/L (ref 8–16)
AST SERPL-CCNC: 14 U/L (ref 10–40)
AST SERPL-CCNC: 14 U/L (ref 10–40)
BASOPHILS # BLD AUTO: 0.02 K/UL (ref 0–0.2)
BASOPHILS # BLD AUTO: 0.02 K/UL (ref 0–0.2)
BASOPHILS NFR BLD: 0.3 % (ref 0–1.9)
BASOPHILS NFR BLD: 0.3 % (ref 0–1.9)
BILIRUB SERPL-MCNC: 1.2 MG/DL (ref 0.1–1)
BILIRUB SERPL-MCNC: 1.2 MG/DL (ref 0.1–1)
BLD GP AB SCN CELLS X3 SERPL QL: NORMAL
BUN SERPL-MCNC: 26 MG/DL (ref 8–23)
BUN SERPL-MCNC: 30 MG/DL (ref 8–23)
CALCIUM SERPL-MCNC: 8.9 MG/DL (ref 8.7–10.5)
CALCIUM SERPL-MCNC: 9 MG/DL (ref 8.7–10.5)
CHLORIDE SERPL-SCNC: 108 MMOL/L (ref 95–110)
CHLORIDE SERPL-SCNC: 109 MMOL/L (ref 95–110)
CO2 SERPL-SCNC: 21 MMOL/L (ref 23–29)
CO2 SERPL-SCNC: 22 MMOL/L (ref 23–29)
CREAT SERPL-MCNC: 0.9 MG/DL (ref 0.5–1.4)
CREAT SERPL-MCNC: 1.2 MG/DL (ref 0.5–1.4)
CTP QC/QA: YES
DIFFERENTIAL METHOD: ABNORMAL
DIFFERENTIAL METHOD: ABNORMAL
EOSINOPHIL # BLD AUTO: 0.1 K/UL (ref 0–0.5)
EOSINOPHIL # BLD AUTO: 0.1 K/UL (ref 0–0.5)
EOSINOPHIL NFR BLD: 1.5 % (ref 0–8)
EOSINOPHIL NFR BLD: 2.2 % (ref 0–8)
ERYTHROCYTE [DISTWIDTH] IN BLOOD BY AUTOMATED COUNT: 14.7 % (ref 11.5–14.5)
ERYTHROCYTE [DISTWIDTH] IN BLOOD BY AUTOMATED COUNT: 15 % (ref 11.5–14.5)
EST. GFR  (AFRICAN AMERICAN): >60 ML/MIN/1.73 M^2
EST. GFR  (AFRICAN AMERICAN): >60 ML/MIN/1.73 M^2
EST. GFR  (NON AFRICAN AMERICAN): >60 ML/MIN/1.73 M^2
EST. GFR  (NON AFRICAN AMERICAN): >60 ML/MIN/1.73 M^2
FIBRINOGEN PPP-MCNC: 452 MG/DL (ref 182–400)
GLUCOSE SERPL-MCNC: 118 MG/DL (ref 70–110)
GLUCOSE SERPL-MCNC: 127 MG/DL (ref 70–110)
HCT VFR BLD AUTO: 28.5 % (ref 40–54)
HCT VFR BLD AUTO: 29.3 % (ref 40–54)
HGB BLD-MCNC: 9.1 G/DL (ref 14–18)
HGB BLD-MCNC: 9.2 G/DL (ref 14–18)
IMM GRANULOCYTES # BLD AUTO: 0.03 K/UL (ref 0–0.04)
IMM GRANULOCYTES # BLD AUTO: 0.04 K/UL (ref 0–0.04)
IMM GRANULOCYTES NFR BLD AUTO: 0.5 % (ref 0–0.5)
IMM GRANULOCYTES NFR BLD AUTO: 0.5 % (ref 0–0.5)
INR PPP: 1.6
LYMPHOCYTES # BLD AUTO: 0.7 K/UL (ref 1–4.8)
LYMPHOCYTES # BLD AUTO: 0.8 K/UL (ref 1–4.8)
LYMPHOCYTES NFR BLD: 11.2 % (ref 18–48)
LYMPHOCYTES NFR BLD: 11.6 % (ref 18–48)
MAGNESIUM SERPL-MCNC: 2 MG/DL (ref 1.6–2.6)
MCH RBC QN AUTO: 26.1 PG (ref 27–31)
MCH RBC QN AUTO: 26.3 PG (ref 27–31)
MCHC RBC AUTO-ENTMCNC: 31.4 G/DL (ref 32–36)
MCHC RBC AUTO-ENTMCNC: 31.9 G/DL (ref 32–36)
MCV RBC AUTO: 82 FL (ref 82–98)
MCV RBC AUTO: 83 FL (ref 82–98)
MONOCYTES # BLD AUTO: 0.4 K/UL (ref 0.3–1)
MONOCYTES # BLD AUTO: 0.5 K/UL (ref 0.3–1)
MONOCYTES NFR BLD: 6.2 % (ref 4–15)
MONOCYTES NFR BLD: 6.4 % (ref 4–15)
NEUTROPHILS # BLD AUTO: 4.6 K/UL (ref 1.8–7.7)
NEUTROPHILS # BLD AUTO: 6 K/UL (ref 1.8–7.7)
NEUTROPHILS NFR BLD: 79 % (ref 38–73)
NEUTROPHILS NFR BLD: 80.3 % (ref 38–73)
NRBC BLD-RTO: 0 /100 WBC
NRBC BLD-RTO: 0 /100 WBC
PHOSPHATE SERPL-MCNC: 2.8 MG/DL (ref 2.7–4.5)
PLATELET # BLD AUTO: 256 K/UL (ref 150–450)
PLATELET # BLD AUTO: 262 K/UL (ref 150–450)
PMV BLD AUTO: 10.8 FL (ref 9.2–12.9)
PMV BLD AUTO: 10.8 FL (ref 9.2–12.9)
POCT GLUCOSE: 99 MG/DL (ref 70–110)
POTASSIUM SERPL-SCNC: 4 MMOL/L (ref 3.5–5.1)
POTASSIUM SERPL-SCNC: 4.3 MMOL/L (ref 3.5–5.1)
PROT SERPL-MCNC: 6.8 G/DL (ref 6–8.4)
PROT SERPL-MCNC: 7.1 G/DL (ref 6–8.4)
PROTHROMBIN TIME: 17.8 SEC (ref 11.4–13.7)
RBC # BLD AUTO: 3.46 M/UL (ref 4.6–6.2)
RBC # BLD AUTO: 3.53 M/UL (ref 4.6–6.2)
SARS-COV-2 RDRP RESP QL NAA+PROBE: NEGATIVE
SODIUM SERPL-SCNC: 138 MMOL/L (ref 136–145)
SODIUM SERPL-SCNC: 140 MMOL/L (ref 136–145)
WBC # BLD AUTO: 5.79 K/UL (ref 3.9–12.7)
WBC # BLD AUTO: 7.41 K/UL (ref 3.9–12.7)

## 2022-05-20 PROCEDURE — D9220A PRA ANESTHESIA: Mod: ANES,,, | Performed by: ANESTHESIOLOGY

## 2022-05-20 PROCEDURE — 85611 PROTHROMBIN TEST: CPT | Performed by: STUDENT IN AN ORGANIZED HEALTH CARE EDUCATION/TRAINING PROGRAM

## 2022-05-20 PROCEDURE — 99284 PR EMERGENCY DEPT VISIT,LEVEL IV: ICD-10-PCS | Mod: CS,,,

## 2022-05-20 PROCEDURE — 84100 ASSAY OF PHOSPHORUS: CPT | Performed by: STUDENT IN AN ORGANIZED HEALTH CARE EDUCATION/TRAINING PROGRAM

## 2022-05-20 PROCEDURE — D9220A PRA ANESTHESIA: ICD-10-PCS | Mod: CRNA,,, | Performed by: STUDENT IN AN ORGANIZED HEALTH CARE EDUCATION/TRAINING PROGRAM

## 2022-05-20 PROCEDURE — 86901 BLOOD TYPING SEROLOGIC RH(D): CPT | Performed by: STUDENT IN AN ORGANIZED HEALTH CARE EDUCATION/TRAINING PROGRAM

## 2022-05-20 PROCEDURE — 25000003 PHARM REV CODE 250: Performed by: ANESTHESIOLOGY

## 2022-05-20 PROCEDURE — 99284 EMERGENCY DEPT VISIT MOD MDM: CPT | Mod: CS,,,

## 2022-05-20 PROCEDURE — 85732 THROMBOPLASTIN TIME PARTIAL: CPT | Performed by: STUDENT IN AN ORGANIZED HEALTH CARE EDUCATION/TRAINING PROGRAM

## 2022-05-20 PROCEDURE — 99214 OFFICE O/P EST MOD 30 MIN: CPT | Mod: ,,, | Performed by: SURGERY

## 2022-05-20 PROCEDURE — U0002 COVID-19 LAB TEST NON-CDC: HCPCS | Performed by: STUDENT IN AN ORGANIZED HEALTH CARE EDUCATION/TRAINING PROGRAM

## 2022-05-20 PROCEDURE — D9220A PRA ANESTHESIA: Mod: CRNA,,, | Performed by: STUDENT IN AN ORGANIZED HEALTH CARE EDUCATION/TRAINING PROGRAM

## 2022-05-20 PROCEDURE — D9220A PRA ANESTHESIA: ICD-10-PCS | Mod: ANES,,, | Performed by: ANESTHESIOLOGY

## 2022-05-20 PROCEDURE — 80053 COMPREHEN METABOLIC PANEL: CPT | Performed by: STUDENT IN AN ORGANIZED HEALTH CARE EDUCATION/TRAINING PROGRAM

## 2022-05-20 PROCEDURE — 94761 N-INVAS EAR/PLS OXIMETRY MLT: CPT

## 2022-05-20 PROCEDURE — 63600175 PHARM REV CODE 636 W HCPCS: Performed by: ANESTHESIOLOGY

## 2022-05-20 PROCEDURE — 63600175 PHARM REV CODE 636 W HCPCS: Performed by: STUDENT IN AN ORGANIZED HEALTH CARE EDUCATION/TRAINING PROGRAM

## 2022-05-20 PROCEDURE — 85025 COMPLETE CBC W/AUTO DIFF WBC: CPT | Performed by: STUDENT IN AN ORGANIZED HEALTH CARE EDUCATION/TRAINING PROGRAM

## 2022-05-20 PROCEDURE — 25000003 PHARM REV CODE 250: Performed by: RADIOLOGY

## 2022-05-20 PROCEDURE — 85384 FIBRINOGEN ACTIVITY: CPT | Performed by: STUDENT IN AN ORGANIZED HEALTH CARE EDUCATION/TRAINING PROGRAM

## 2022-05-20 PROCEDURE — 99214 PR OFFICE/OUTPT VISIT, EST, LEVL IV, 30-39 MIN: ICD-10-PCS | Mod: ,,, | Performed by: SURGERY

## 2022-05-20 PROCEDURE — 99285 EMERGENCY DEPT VISIT HI MDM: CPT | Mod: 25

## 2022-05-20 PROCEDURE — G0378 HOSPITAL OBSERVATION PER HR: HCPCS

## 2022-05-20 PROCEDURE — 83735 ASSAY OF MAGNESIUM: CPT | Performed by: STUDENT IN AN ORGANIZED HEALTH CARE EDUCATION/TRAINING PROGRAM

## 2022-05-20 PROCEDURE — 25500020 PHARM REV CODE 255: Performed by: EMERGENCY MEDICINE

## 2022-05-20 RX ORDER — MIDAZOLAM HYDROCHLORIDE 1 MG/ML
INJECTION, SOLUTION INTRAMUSCULAR; INTRAVENOUS
Status: DISCONTINUED | OUTPATIENT
Start: 2022-05-20 | End: 2022-05-20

## 2022-05-20 RX ORDER — MAGNESIUM SULFATE HEPTAHYDRATE 40 MG/ML
4 INJECTION, SOLUTION INTRAVENOUS
Status: DISCONTINUED | OUTPATIENT
Start: 2022-05-20 | End: 2022-05-23

## 2022-05-20 RX ORDER — ACETAMINOPHEN 325 MG/1
650 TABLET ORAL EVERY 4 HOURS PRN
Status: DISCONTINUED | OUTPATIENT
Start: 2022-05-20 | End: 2022-05-23 | Stop reason: HOSPADM

## 2022-05-20 RX ORDER — KETAMINE HCL IN 0.9 % NACL 50 MG/5 ML
SYRINGE (ML) INTRAVENOUS
Status: DISCONTINUED | OUTPATIENT
Start: 2022-05-20 | End: 2022-05-20

## 2022-05-20 RX ORDER — PROPOFOL 10 MG/ML
VIAL (ML) INTRAVENOUS
Status: DISCONTINUED | OUTPATIENT
Start: 2022-05-20 | End: 2022-05-20

## 2022-05-20 RX ORDER — LEVOTHYROXINE SODIUM 100 UG/1
100 TABLET ORAL
Status: DISCONTINUED | OUTPATIENT
Start: 2022-05-21 | End: 2022-05-23 | Stop reason: HOSPADM

## 2022-05-20 RX ORDER — GLUCAGON 1 MG
1 KIT INJECTION
Status: DISCONTINUED | OUTPATIENT
Start: 2022-05-20 | End: 2022-05-23 | Stop reason: HOSPADM

## 2022-05-20 RX ORDER — IBUPROFEN 200 MG
16 TABLET ORAL
Status: DISCONTINUED | OUTPATIENT
Start: 2022-05-20 | End: 2022-05-23 | Stop reason: HOSPADM

## 2022-05-20 RX ORDER — ONDANSETRON 2 MG/ML
4 INJECTION INTRAMUSCULAR; INTRAVENOUS EVERY 8 HOURS PRN
Status: DISCONTINUED | OUTPATIENT
Start: 2022-05-20 | End: 2022-05-23 | Stop reason: HOSPADM

## 2022-05-20 RX ORDER — ONDANSETRON 2 MG/ML
INJECTION INTRAMUSCULAR; INTRAVENOUS
Status: DISCONTINUED | OUTPATIENT
Start: 2022-05-20 | End: 2022-05-20

## 2022-05-20 RX ORDER — TALC
6 POWDER (GRAM) TOPICAL NIGHTLY PRN
Status: DISCONTINUED | OUTPATIENT
Start: 2022-05-20 | End: 2022-05-23 | Stop reason: HOSPADM

## 2022-05-20 RX ORDER — PROCHLORPERAZINE EDISYLATE 5 MG/ML
5 INJECTION INTRAMUSCULAR; INTRAVENOUS EVERY 6 HOURS PRN
Status: DISCONTINUED | OUTPATIENT
Start: 2022-05-20 | End: 2022-05-23 | Stop reason: HOSPADM

## 2022-05-20 RX ORDER — IBUPROFEN 200 MG
24 TABLET ORAL
Status: DISCONTINUED | OUTPATIENT
Start: 2022-05-20 | End: 2022-05-23 | Stop reason: HOSPADM

## 2022-05-20 RX ORDER — SODIUM CHLORIDE 0.9 % (FLUSH) 0.9 %
10 SYRINGE (ML) INJECTION
Status: DISCONTINUED | OUTPATIENT
Start: 2022-05-20 | End: 2022-05-23 | Stop reason: HOSPADM

## 2022-05-20 RX ORDER — CALCIUM GLUCONATE 20 MG/ML
1 INJECTION, SOLUTION INTRAVENOUS
Status: DISCONTINUED | OUTPATIENT
Start: 2022-05-20 | End: 2022-05-23

## 2022-05-20 RX ORDER — CALCIUM GLUCONATE 20 MG/ML
3 INJECTION, SOLUTION INTRAVENOUS
Status: DISCONTINUED | OUTPATIENT
Start: 2022-05-20 | End: 2022-05-23

## 2022-05-20 RX ORDER — POTASSIUM CHLORIDE 7.45 MG/ML
80 INJECTION INTRAVENOUS
Status: DISCONTINUED | OUTPATIENT
Start: 2022-05-20 | End: 2022-05-23

## 2022-05-20 RX ORDER — PANTOPRAZOLE SODIUM 40 MG/1
40 TABLET, DELAYED RELEASE ORAL DAILY
Status: DISCONTINUED | OUTPATIENT
Start: 2022-05-21 | End: 2022-05-23 | Stop reason: HOSPADM

## 2022-05-20 RX ORDER — POTASSIUM CHLORIDE 7.45 MG/ML
40 INJECTION INTRAVENOUS
Status: DISCONTINUED | OUTPATIENT
Start: 2022-05-20 | End: 2022-05-23

## 2022-05-20 RX ORDER — MAGNESIUM SULFATE HEPTAHYDRATE 40 MG/ML
2 INJECTION, SOLUTION INTRAVENOUS
Status: DISCONTINUED | OUTPATIENT
Start: 2022-05-20 | End: 2022-05-23

## 2022-05-20 RX ORDER — POTASSIUM CHLORIDE 7.45 MG/ML
60 INJECTION INTRAVENOUS
Status: DISCONTINUED | OUTPATIENT
Start: 2022-05-20 | End: 2022-05-23

## 2022-05-20 RX ORDER — CALCIUM GLUCONATE 20 MG/ML
2 INJECTION, SOLUTION INTRAVENOUS
Status: DISCONTINUED | OUTPATIENT
Start: 2022-05-20 | End: 2022-05-23

## 2022-05-20 RX ORDER — AMOXICILLIN 250 MG
1 CAPSULE ORAL DAILY PRN
Status: DISCONTINUED | OUTPATIENT
Start: 2022-05-20 | End: 2022-05-23 | Stop reason: HOSPADM

## 2022-05-20 RX ORDER — FENTANYL CITRATE 50 UG/ML
INJECTION, SOLUTION INTRAMUSCULAR; INTRAVENOUS
Status: DISCONTINUED | OUTPATIENT
Start: 2022-05-20 | End: 2022-05-20

## 2022-05-20 RX ADMIN — ONDANSETRON 4 MG: 2 INJECTION INTRAMUSCULAR; INTRAVENOUS at 02:05

## 2022-05-20 RX ADMIN — PROPOFOL 20 MG: 10 INJECTION, EMULSION INTRAVENOUS at 01:05

## 2022-05-20 RX ADMIN — Medication 30 MG: at 01:05

## 2022-05-20 RX ADMIN — GELATIN ABSORBABLE SPONGE 12-7 MM 1 EACH: 12-7 MISC at 02:05

## 2022-05-20 RX ADMIN — SODIUM CHLORIDE, SODIUM GLUCONATE, SODIUM ACETATE, POTASSIUM CHLORIDE, MAGNESIUM CHLORIDE, SODIUM PHOSPHATE, DIBASIC, AND POTASSIUM PHOSPHATE: .53; .5; .37; .037; .03; .012; .00082 INJECTION, SOLUTION INTRAVENOUS at 01:05

## 2022-05-20 RX ADMIN — FENTANYL CITRATE 50 MCG: 50 INJECTION INTRAMUSCULAR; INTRAVENOUS at 01:05

## 2022-05-20 RX ADMIN — MIDAZOLAM 2 MG: 1 INJECTION INTRAMUSCULAR; INTRAVENOUS at 01:05

## 2022-05-20 RX ADMIN — IOHEXOL 70 ML: 350 INJECTION, SOLUTION INTRAVENOUS at 03:05

## 2022-05-20 RX ADMIN — FENTANYL CITRATE 25 MCG: 50 INJECTION INTRAMUSCULAR; INTRAVENOUS at 02:05

## 2022-05-20 NOTE — SUBJECTIVE & OBJECTIVE
"Medications:  Continuous Infusions:  Scheduled Meds:  PRN Meds:     Current Facility-Administered Medications on File Prior to Encounter   Medication    [COMPLETED] iohexoL (OMNIPAQUE 350) injection 100 mL    lactated ringers infusion     Current Outpatient Medications on File Prior to Encounter   Medication Sig    apixaban (ELIQUIS) 5 mg Tab Take 1 tablet (5 mg total) by mouth 2 (two) times daily.    aspirin (ECOTRIN) 81 MG EC tablet Take 81 mg by mouth Daily.    blood sugar diagnostic (BLOOD GLUCOSE TEST) Strp 1 strip by Misc.(Non-Drug; Combo Route) route 2 (two) times daily before meals.    esomeprazole (NEXIUM) 20 MG capsule Take 20 mg by mouth once daily.    levothyroxine (SYNTHROID) 100 MCG tablet Take 1 tablet (100 mcg total) by mouth before breakfast.    losartan (COZAAR) 100 MG tablet TAKE 1 TABLET BY MOUTH EVERY DAY (Patient taking differently: Take 100 mg by mouth every evening.)    pen needle, diabetic (BD ULTRA-FINE YULISSA PEN NEEDLE) 32 gauge x 5/32" Ndle Use once weekly. (Patient taking differently: 1 pen by Misc.(Non-Drug; Combo Route) route once a week. 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     Past Surgical History:   Procedure Laterality Date    DIRECT LARYNGOBRONCHOSCOPY N/A 12/27/2021    Procedure: LARYNGOSCOPY, DIRECT, WITH BRONCHOSCOPY;  Surgeon: Flex Espinosa MD;  Location: [facility] OR [address] [place] [address];  Service: ENT;  Laterality: N/A;    DISSECTION OF NECK Bilateral 1/6/2022    Procedure: DISSECTION, NECK;  Surgeon: Jesse James MD;  Location: [facility] OR [address] [place] [address];  Service: ENT;  Laterality: Bilateral;    FLAP PROCEDURE Right 1/6/2022    Procedure: CREATION, FREE FLAP;  " Surgeon: Alise Hart MD;  Location: [facility] OR 2ND FLR;  Service: ENT;  Laterality: Right;  Ischemic start 1351  Ischemic stop 1502    LARYNGECTOMY N/A 1/6/2022    Procedure: LARYNGECTOMY;  Surgeon: Jesse James MD;  Location: [facility] OR 2ND FLR;  Service: ENT;  Laterality: N/A;    LARYNGOSCOPY N/A 8/4/2020    Procedure: Suspension microlaryngoscopy with biopsy, possible KTP laser treatment/excision;  Surgeon: Stew Noel MD;  Location: [facility] OR [facility] FLR;  Service: ENT;  Laterality: N/A;  Microscope, telescopes, tower, microinstruments, KTP laser, rep conf# [id number] IC 7/28.    LARYNGOSCOPY N/A 3/16/2021    Procedure: Suspension microlaryngoscopy with excision of lesion, possible CO2 laser;  Surgeon: Stew Noel MD;  [facility];  Service: ENT;  Laterality: N/A;  Microscope, telescopes, tower, microinstruments, CO2 laser, rep conf# [id number] IC 3/4.    LARYNGOSCOPY N/A 4/1/2021    Procedure: Suspension microlaryngoscopy with KTP laser excision of lesion;  Surgeon: Stew Noel MD;  [facility];  Service: ENT;  Laterality: N/A;  Microscope, telescopes, tower, microinstruments, 70 degree scope, vocal fold , KTP laser, rep conf# [id number] BC    LARYNGOSCOPY N/A 12/9/2021    Procedure: Suspension microlaryngoscopy with biopsy;  Surgeon: Stew Noel MD;  [facility];  Service: ENT;  Laterality: N/A;  Microscope, telescopes, tower, microinstruments    LARYNGOSCOPY N/A 1/6/2022    Procedure: LARYNGOSCOPY;  Surgeon: Jesse James MD;  [facility];  Service: ENT;  Laterality: N/A;    LARYNGOSCOPY N/A 4/27/2022    Procedure: LARYNGOSCOPY WITH BIOPSY;  Surgeon: Jesse James MD;  Location: [facility] OR [facility] FLR;  Service: ENT;  Laterality: N/A;    MICROLARYNGOSCOPY N/A 3/17/2020    Procedure: MICROLARYNGOSCOPY;  Surgeon: Jung Xiao MD;  Location: [facility] OR;  Service: ENT;  Laterality: N/A;  Laser Microlaryngoscopy  NEED TO  SCHEDULE LASER from [facility] [id number]    REIMPLANTATION OF PARATHYROID TISSUE N/A 1/6/2022    Procedure: REIMPLANTATION, PARATHYROID TISSUE;  Surgeon: Jesse James MD;  [facility];  Service: ENT;  Laterality: N/A;    THYROIDECTOMY  1/6/2022    Procedure: THYROIDECTOMY;  Surgeon: Jesse James MD;  [facility];  Service: ENT;;    TRACHEOSTOMY N/A 12/27/2021    Procedure: CREATION, TRACHEOSTOMY;  Surgeon: Flex Espinosa MD;  [facility];  Service: ENT;  Laterality: N/A;     Family History       Problem Relation (Age of Onset)    Abnormal EKG Mother    Diabetes Father    Heart disease Father    Hypertension Father          Tobacco Use    Smoking status: Never Smoker    Smokeless tobacco: Never Used   Substance and Sexual Activity    Alcohol use: Not Currently     Comment: occasional    Drug use: No    Sexual activity: Yes     Partners: Female     Review of Systems   HENT:  Negative for nosebleeds.    Respiratory:  Negative for shortness of breath and stridor.    Objective:     Vital Signs (Most Recent):  Temp: 97 °F (36.1 °C) (05/20/22 0650)  Pulse: 78 (05/20/22 0902)  Resp: 15 (05/20/22 0847)  BP: (!) 120/58 (05/20/22 0902)  SpO2: 100 % (05/20/22 0902)   Vital Signs (24h Range):  Temp:  [97 °F (36.1 °C)-98.9 °F (37.2 °C)] 97 °F (36.1 °C)  Pulse:  [72-97] 78  Resp:  [15-20] 15  SpO2:  [100 %] 100 %  BP: (120-134)/(58-76) 120/58     Weight: 63.5 kg (140 lb)  Body mass index is 22.6 kg/m².    Physical Exam  Resting comfortably on bed  Oral cavity with moist mucous membrane, blood clots suctioned from oropharynx  Laryngeal stoma intact, no blood    Significant Labs:  BMP:   Recent Labs   Lab 05/19/22  2350   *      CO2 21*   BUN 30*   CREATININE 1.2   CALCIUM 8.9     CBC:   Recent Labs   Lab 05/19/22  2350   WBC 7.41   RBC 3.46*   HGB 9.1*   HCT 28.5*      MCV 82   MCH 26.3*   MCHC 31.9*     Significant Diagnostics:  CT: I have  reviewed all pertinent results/findings within the past 24 hours and my personal findings are:  possible bleeding source from left lingual artery

## 2022-05-20 NOTE — ED NOTES
PT to ED via EMS from [facility] for eval of oral bleeding. Hx throat ca with no bleeding noted to neck/trach site. PT using suction to spit up bloody discharge. Pt denies any pain to site. Pt using board to communicate. Respiration even and unlabored. Pt placed on cardiac, BP and O2 monitor with call light within reach. Side rails up x 2. Will continue to monitor.

## 2022-05-20 NOTE — PLAN OF CARE
Discussed with Dr. Elsie Argueta of Hematology. Eliquis can sometimes be the cause of increased PT, but will check fibrinogen and mixing panels (PT/PTT) to rule out any other etiologies of elevated PT.     -- Heme labs ordered  -- Continue ICU observation overnight, if stable, can step down to floor tomorrow  -- Please Secure Chat me for any questions, page ENT on-call if unresponsive or urgent     Alan Resendiz, PGY2  05/20/2022 5:30 PM

## 2022-05-20 NOTE — Clinical Note
A pre-sedation assessment was completed by the physician immediately prior to sedation start.  Anesthesia present to monitor pt and administer medications. See anesthesia record for details.

## 2022-05-20 NOTE — ED NOTES
Pt has hx of oral cancer.  Pt has had bleeding to mouth starting at 0600 today.  Pt has a hx of bleeding but resolved itself.  AAOx4.  Cardiac WDL; Resp WDL

## 2022-05-20 NOTE — TRANSFER OF CARE
"Anesthesia Transfer of Care Note    Patient: Cyrus Batres Jr.    Procedure(s) Performed: * No procedures listed *    Patient location: ICU    Anesthesia Type: MAC    Transport from OR: Transported from OR on room air with adequate spontaneous ventilation    Post pain: adequate analgesia    Post assessment: no apparent anesthetic complications and tolerated procedure well    Post vital signs: stable    Level of consciousness: awake and alert    Nausea/Vomiting: no nausea/vomiting    Complications: none    Transfer of care protocol was followed      Last vitals:   Visit Vitals  BP (!) 140/69   Pulse 75   Temp 36.6 °C (97.9 °F) (Oral)   Resp (!) 18   Ht 5' 6" (1.676 m)   Wt 63.5 kg (140 lb)   SpO2 100%   BMI 22.60 kg/m²     "

## 2022-05-20 NOTE — PLAN OF CARE
Pt received into IR Rm 4. Patient AAOx3, no distress noted, respirations even and unlabored, VS stable, anesthesia will continue to monitor. Acceptance of education, consents signed, H/P done. Labs reviewed. Anesthesia present to monitor pt and administer medications. See anesthesia record for details.

## 2022-05-20 NOTE — ANESTHESIA PREPROCEDURE EVALUATION
05/20/2022  Cyrus Batres Jr. 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 Patient previously admitted  on 5/15-5/16 for hemoptysis. The bleeding stabilized without any interventions and the patient was discharged. Patient reports bleeding episodes started again yesterday.    Lab Results   Component Value Date    WBC 5.79 05/20/2022    HGB 9.2 (L) 05/20/2022    HCT 29.3 (L) 05/20/2022    MCV 83 05/20/2022     05/20/2022         Chemistry        Component Value Date/Time     05/20/2022 1113    K 4.0 05/20/2022 1113     05/20/2022 1113    CO2 22 (L) 05/20/2022 1113    BUN 26 (H) 05/20/2022 1113    CREATININE 0.9 05/20/2022 1113     (H) 05/20/2022 1113        Component Value Date/Time    CALCIUM 9.0 05/20/2022 1113    ALKPHOS 98 05/20/2022 1113    AST 14 05/20/2022 1113    ALT 8 (L) 05/20/2022 1113    BILITOT 1.2 (H) 05/20/2022 1113    ESTGFRAFRICA >60.0 05/20/2022 1113    EGFRNONAA >60.0 05/20/2022 1113        No results found for this or any previous visit.        Pre-op Assessment    I have reviewed the Patient Summary Reports.     I have reviewed the Nursing Notes. I have reviewed the NPO Status.      Review of Systems      Physical Exam  General: Well nourished, Cooperative and Alert    Airway:  Pre-Existing Airway: Tracheal Stoma        Anesthesia Plan  Type of Anesthesia, risks & benefits discussed:    Anesthesia Type: Gen ETT  Intra-op Monitoring Plan: Standard ASA Monitors  Post Op Pain Control Plan: multimodal analgesia  Induction:  IV  Informed Consent: Informed consent signed with the Patient and all parties understand the risks and agree with anesthesia plan.  All questions answered. Patient consented to blood products? Yes  ASA Score: 3  Anesthesia Plan Notes: Pt with Tracheostomy in place; no  bleeding from stoma site    Ready For Surgery From Anesthesia Perspective.     .

## 2022-05-20 NOTE — PROGRESS NOTES
Report received from RN. Pt transported to SICU [id number] with portable telemetry. Pt connected to ICU monitor and pt maintaining SpO2 >95% on room air with previous laryngectomy.  SICU resident called and made aware of patient arrival. New orders received and implemented. Pt assessed, immediate needs met, updated on POC and current condition. Pt communication needs assessed; pt able to mouth responses and written communication board in use. All questions answered, emotional support provided.     Admit Skin Note: all skin assessed for redness and breakdown on admit to SICU. Heels and sacrum CDI, foam dressings applied, pt able to move all extremities freely and independently, ICU bed plugged in and working properly, waffle mattress inflated.

## 2022-05-20 NOTE — H&P
Inpatient Radiology Pre-procedure Note    History of Present Illness:  Cyrus Batres Jr. is a 71 y.o. 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 Hence, plans for external carotid angiogram to investigate for any active vascular tumor feeders & bleeders +/- lingual artery embolization.     Admission H&P reviewed.    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     Past Surgical History:   Procedure Laterality Date    DIRECT LARYNGOBRONCHOSCOPY N/A 12/27/2021    Procedure: LARYNGOSCOPY, DIRECT, WITH BRONCHOSCOPY;  Surgeon: Flex Espinosa MD;  Location: [facility] OR [address] [place] [address];  Service: ENT;  Laterality: N/A;    DISSECTION OF NECK Bilateral 1/6/2022    Procedure: DISSECTION, NECK;  Surgeon: Jesse James MD;  [facility];  Service: ENT;  Laterality: Bilateral;    FLAP PROCEDURE Right 1/6/2022    Procedure: CREATION, FREE FLAP;  Surgeon: Alise Hart MD;  [facility];  Service: ENT;  Laterality: Right;  Ischemic start 1351  Ischemic stop 1502    LARYNGECTOMY N/A 1/6/2022    Procedure: LARYNGECTOMY;  Surgeon: Jesse James MD;  [facility];  Service: ENT;  Laterality: N/A;    LARYNGOSCOPY N/A 8/4/2020    Procedure: Suspension microlaryngoscopy with biopsy, possible KTP laser treatment/excision;  Surgeon: Stew Noel MD;  Location: [facility] OR [address] [place] [address];  Service: ENT;  Laterality: N/A;  Microscope, telescopes, tower, microinstruments, KTP laser, rep conf# [id number]  7/28.    LARYNGOSCOPY N/A 3/16/2021    Procedure: Suspension microlaryngoscopy with excision of lesion, possible CO2 laser;  Surgeon: Stew Noel MD;  Location: [facility] OR [address] [place] [address];  Service: ENT;  Laterality: N/A;   Microscope, telescopes, tower, microinstruments, CO2 laser, rep conf# [id number] IC 3/4.    LARYNGOSCOPY N/A 4/1/2021    Procedure: Suspension microlaryngoscopy with KTP laser excision of lesion;  Surgeon: Stew Noel MD;  [facility];  Service: ENT;  Laterality: N/A;  Microscope, telescopes, tower, microinstruments, 70 degree scope, vocal fold , KTP laser, rep conf# [id number] BC    LARYNGOSCOPY N/A 12/9/2021    Procedure: Suspension microlaryngoscopy with biopsy;  Surgeon: Stew Noel MD;  [facility];  Service: ENT;  Laterality: N/A;  Microscope, telescopes, tower, microinstruments    LARYNGOSCOPY N/A 1/6/2022    Procedure: LARYNGOSCOPY;  Surgeon: Jesse James MD;  [facility];  Service: ENT;  Laterality: N/A;    LARYNGOSCOPY N/A 4/27/2022    Procedure: LARYNGOSCOPY WITH BIOPSY;  Surgeon: Jesse James MD;  [facility];  Service: ENT;  Laterality: N/A;    MICROLARYNGOSCOPY N/A 3/17/2020    Procedure: MICROLARYNGOSCOPY;  Surgeon: Jung Xiao MD;  [facility];  Service: ENT;  Laterality: N/A;  Laser Microlaryngoscopy  NEED TO SCHEDULE LASER from [facility] [id number]    REIMPLANTATION OF PARATHYROID TISSUE N/A 1/6/2022    Procedure: REIMPLANTATION, PARATHYROID TISSUE;  Surgeon: Jesse James MD;  [facility];  Service: ENT;  Laterality: N/A;    THYROIDECTOMY  1/6/2022    Procedure: THYROIDECTOMY;  Surgeon: Jesse James MD;  [facility];  Service: ENT;;    TRACHEOSTOMY N/A 12/27/2021    Procedure: CREATION, TRACHEOSTOMY;  Surgeon: Flex Espinosa MD;  [facility];  Service: ENT;  Laterality: N/A;       Review of Systems:   As documented in primary team H&P    Home Meds:   Prior to Admission medications    Medication Sig Start Date End Date Taking? Authorizing Provider   apixaban (ELIQUIS) 5 mg Tab Take 1 tablet (5 mg total) by mouth 2 (two) times  "daily. 5/3/21   Anne Ugarte NP   aspirin (ECOTRIN) 81 MG EC tablet Take 81 mg by mouth Daily.    Historical Provider   blood sugar diagnostic (BLOOD GLUCOSE TEST) Strp 1 strip by Misc.(Non-Drug; Combo Route) route 2 (two) times daily before meals. 1/30/19   Maico Patterson MD   esomeprazole (NEXIUM) 20 MG capsule Take 20 mg by mouth once daily.    Historical Provider   levothyroxine (SYNTHROID) 100 MCG tablet Take 1 tablet (100 mcg total) by mouth before breakfast. 2/18/22 2/18/23  Fariha Muñoz NP   losartan (COZAAR) 100 MG tablet TAKE 1 TABLET BY MOUTH EVERY DAY  Patient taking differently: Take 100 mg by mouth every evening. 7/6/21   MICHELLE Silveira PA-C   pen needle, diabetic (BD ULTRA-FINE YULISSA PEN NEEDLE) 32 gauge x 5/32" Ndle Use once weekly.  Patient taking differently: 1 pen by Misc.(Non-Drug; Combo Route) route once a week. Use once weekly. 10/4/21   MICHELLE Silveira PA-C     Scheduled Meds:   Continuous Infusions:   PRN Meds:  Anticoagulants/Antiplatelets: Hold on apixiban     Allergies:   Review of patient's allergies indicates:   Allergen Reactions    Pollen extracts     Lovastatin Rash     Not confirmed but pt skeptical     Sedation Hx: have not been any systemic reactions    Labs:  Recent Labs   Lab 05/19/22  2350   INR 1.6       Recent Labs   Lab 05/19/22  2350   WBC 7.41   HGB 9.1*   HCT 28.5*   MCV 82         Recent Labs   Lab 05/19/22  2350   *      K 4.3      CO2 21*   BUN 30*   CREATININE 1.2   CALCIUM 8.9   ALT 11   AST 14   ALBUMIN 4.0   BILITOT 1.2*         Vitals:  Temp: 97 °F (36.1 °C) (05/20/22 0650)  Pulse: 78 (05/20/22 0902)  Resp: 15 (05/20/22 0847)  BP: (!) 120/58 (05/20/22 0902)  SpO2: 100 % (05/20/22 0902)     Physical Exam:  ASA: 3  Mallampati: Trach / On room air     General: no acute distress  Mental Status: alert and oriented to person, place and time  HEENT: Trach / On room air   Chest: unlabored breathing  Heart: regular heart rate  Abdomen: " nondistended  Extremity: moves all extremities      Plan:  Sedation Plan: Up to moderate    Patient will undergo: external carotid angiogram to investigate for any active vascular tumor feeders & bleeders +/- lingual artery embolization.           Ava Oconnor MD     Neuro Endovascular Surgery Fellow   [facility]

## 2022-05-20 NOTE — Clinical Note
Diagnosis: Oral bleeding [197627]   Future Attending Provider: RANULFO COLLADO [[id number]]   Is the patient being sent to ED Observation?: No   Admitting Provider:: RANULFO COLLADO [[id number]]   Bed request comments: SICU 10W

## 2022-05-20 NOTE — ED PROVIDER NOTES
Encounter Date: 5/20/2022       History     Chief Complaint   Patient presents with    Transfer     For ENT from [place]. Throat cancer with oral bleeding     Mr. 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  Patient spit up around 250 cc blood yesterday morning.  Since then suctioning minute amount of blood from oropharynx, 2-4 cc on arrival to [facility].  Patient denies throat pain, fever, chills, lightheadedness, dizziness, chest pain, dyspnea, abdominal pain, n/v.  Patient transferred to [facility] for [place] evaluation.        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     Past Surgical History:   Procedure Laterality Date    DIRECT LARYNGOBRONCHOSCOPY N/A 12/27/2021    Procedure: LARYNGOSCOPY, DIRECT, WITH BRONCHOSCOPY;  Surgeon: Flex Espinosa MD;  Location: [address];  Service: ENT;  Laterality: N/A;    DISSECTION OF NECK Bilateral 1/6/2022    Procedure: DISSECTION, NECK;  Surgeon: Jesse James MD;  Location: [address];  Service: ENT;  Laterality: Bilateral;    FLAP PROCEDURE Right 1/6/2022    Procedure: CREATION, FREE FLAP;  Surgeon: Alise Hart MD;  Location: [address];  Service: ENT;  Laterality: Right;  Ischemic start 1351  Ischemic stop 1502    LARYNGECTOMY N/A 1/6/2022    Procedure: LARYNGECTOMY;  Surgeon: Jesse James MD;  Location: [address];  Service: ENT;  Laterality: N/A;    LARYNGOSCOPY N/A 8/4/2020    Procedure: Suspension microlaryngoscopy with biopsy, possible KTP laser treatment/excision;  Surgeon: Stew Noel MD;  Location: [address]  [facility];  Service: ENT;  Laterality: N/A;  Microscope, telescopes, tower, microinstruments, KTP laser, rep conf# [id number] IC 7/28.    LARYNGOSCOPY N/A 3/16/2021    Procedure: Suspension microlaryngoscopy with excision of lesion, possible CO2 laser;  Surgeon: Stew Noel MD;  [facility];  Service: ENT;  Laterality: N/A;  Microscope, telescopes, tower, microinstruments, CO2 laser, rep conf# [id number] IC 3/4.    LARYNGOSCOPY N/A 4/1/2021    Procedure: Suspension microlaryngoscopy with KTP laser excision of lesion;  Surgeon: Stew Noel MD;  [facility];  Service: ENT;  Laterality: N/A;  Microscope, telescopes, tower, microinstruments, 70 degree scope, vocal fold , KTP laser, rep conf# [id number] BC    LARYNGOSCOPY N/A 12/9/2021    Procedure: Suspension microlaryngoscopy with biopsy;  Surgeon: Stew Noel MD;  [facility];  Service: ENT;  Laterality: N/A;  Microscope, telescopes, tower, microinstruments    LARYNGOSCOPY N/A 1/6/2022    Procedure: LARYNGOSCOPY;  Surgeon: Jesse James MD;  [facility];  Service: ENT;  Laterality: N/A;    LARYNGOSCOPY N/A 4/27/2022    Procedure: LARYNGOSCOPY WITH BIOPSY;  Surgeon: Jesse James MD;  [facility];  Service: ENT;  Laterality: N/A;    MICROLARYNGOSCOPY N/A 3/17/2020    Procedure: MICROLARYNGOSCOPY;  Surgeon: Jung Xiao MD;  [facility];  Service: ENT;  Laterality: N/A;  Laser Microlaryngoscopy  NEED TO SCHEDULE LASER from [facility] [id number]    REIMPLANTATION OF PARATHYROID TISSUE N/A 1/6/2022    Procedure: REIMPLANTATION, PARATHYROID TISSUE;  Surgeon: Jesse James MD;  [facility]: [facility];  Service: ENT;  Laterality: N/A;    THYROIDECTOMY  1/6/2022    Procedure: THYROIDECTOMY;  Surgeon: Jesse James MD;  Location: [facility] OR [address] [place] [address];  Service: ENT;;    TRACHEOSTOMY N/A 12/27/2021    Procedure: CREATION, TRACHEOSTOMY;   Surgeon: Flex Espinosa MD;  Location: [facility] OR [address] [place] [address];  Service: ENT;  Laterality: N/A;     Family History   Problem Relation Age of Onset    Abnormal EKG Mother     Diabetes Father     Heart disease Father     Hypertension Father      Social History     Tobacco Use    Smoking status: Never Smoker    Smokeless tobacco: Never Used   Substance Use Topics    Alcohol use: Not Currently     Comment: occasional    Drug use: No     Review of Systems   Constitutional: Negative for chills, fatigue and fever.   HENT: Negative for drooling and sore throat.    Respiratory: Negative for cough and shortness of breath.    Cardiovascular: Negative for chest pain and palpitations.   Gastrointestinal: Negative for abdominal pain, nausea and vomiting.   Genitourinary: Negative for dysuria, flank pain and hematuria.   Musculoskeletal: Negative for myalgias, neck pain and neck stiffness.   Skin: Negative for pallor and wound.   Neurological: Negative for dizziness and light-headedness.       Physical Exam     Initial Vitals [05/20/22 0650]   BP Pulse Resp Temp SpO2   120/72 80 18 97 °F (36.1 °C) 100 %      MAP       --         Physical Exam    Nursing note and vitals reviewed.  Constitutional: He appears well-developed and well-nourished. No distress.   HENT:   Head: Normocephalic and atraumatic.   Mouth/Throat: Oropharynx is clear and moist. No oropharyngeal exudate or posterior oropharyngeal erythema.   No blood visualized in oropharynx   Eyes: Conjunctivae and EOM are normal. No scleral icterus.   Neck: Neck supple.   Trach clean without erythema or drainage   Normal range of motion.  Cardiovascular: Normal rate, regular rhythm and intact distal pulses.   Pulmonary/Chest: Breath sounds normal. No respiratory distress. He has no wheezes. He has no rales.   Abdominal: Abdomen is soft. Bowel sounds are normal. He exhibits no distension. There is no abdominal tenderness. There is no rebound and no guarding.   Musculoskeletal:          General: No tenderness or edema. Normal range of motion.      Cervical back: Normal range of motion and neck supple.     Lymphadenopathy:     He has no cervical adenopathy.   Neurological: He is alert and oriented to person, place, and time.   Skin: Skin is warm and dry.   Psychiatric: He has a normal mood and affect. His behavior is normal. Thought content normal.         ED Course   Procedures  Labs Reviewed - No data to display       Imaging Results    None          Medications - No data to display  Medical Decision Making:   History:   Old Medical Records: I decided to obtain old medical records.  Initial Assessment:   Mr. 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  Differential Diagnosis:   Trach bleed  Progression of SCC  GI Bleed  Clinical Tests:   Lab Tests: Reviewed  Radiological Study: Reviewed  Medical Tests: Reviewed  ED Management:  Vitals stable on arrival.  Continuing to have minute amount of blood suctioned from oropharynx, no blood visualized in oropharynx.  Hgb stable and around baseline.  CTA from OSH showing possible larger mass within hypopharynx when compared to 4/24/22, no arterial compromise noted.  ENT consutled on arrival and will admit patient for further evaluation.                       Clinical Impression:   Final diagnoses:  [K13.79] Oral bleeding (Primary)          ED Disposition Condition    Admit               Javon Bahena MD  Resident  05/20/22 0938

## 2022-05-20 NOTE — PLAN OF CARE
"SICU PLAN OF CARE NOTE    Dx: Oral bleeding    Goals of Care:  MAP >65    Vital Signs:  /67   Pulse (!) 57   Temp 98 °F (36.7 °C) (Oral)   Resp 12   Ht 5' 6" (1.676 m)   Wt 63.5 kg (140 lb)   SpO2 100%   BMI 22.60 kg/m²     Cardiac:  NSR    Resp:  SpO2 100% on room air     Neuro:  AAO x4, Follows Commands, and Moves All Extremities, pt uses written communication    Urine Output:  Voids Spontaneously     Diet:  NPO     Labs/Accuchecks:  all labs trended reviewed and replaced accordingly.     Shift Events: Admit to SICU for oral bleeding, IR for R groin angiogram. All VSS at this time. See flowsheet for further assessment/details.  Patient updated on current condition/plan of care, questions answered, and emotional support provided.   MD updated on current condition, vitals, labs, and gtts.      "

## 2022-05-20 NOTE — HPI
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 Patient previously admitted to our service on 5/15-5/16 for hemoptysis. The bleeding stabilized without any interventions and the patient was discharged. Patient reports bleeding episodes started again yesterday. CTA at that time with no obvious source of bleeding per read.

## 2022-05-20 NOTE — PROCEDURES
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  The right ICA, right ECA, Left CCA, left ICA, left ECA and left lingual arteries were subselected and angiography of the brain was performed after injection into each of these vessels.    Preliminary interpretation:     Hypervascularity of the debbie-pharyngeal tumor noted, with predominance in contributions from left lingual artery. However, no contrast extravasation or bleeding vessel noted in this injection. No interventions performed.     Please see Imaging report for full details.    A right femoral artery angiogram was performed, the sheath removed and hemostasis achieved using exoseal.  No hematoma was present at the time of hemostasis.    The patient tolerated the procedure well.         Ava Oconnor MD     Neuro Endovascular Surgery Fellow   [facility]

## 2022-05-20 NOTE — ED PROVIDER NOTES
Encounter Date: 5/19/2022       History     Chief Complaint   Patient presents with    Oral Swelling     71-year-old male history of squamous cell carcinoma of the base of the tongue presenting to the ER tonight with or bleeding.  Started around 6:00 a.m. tonight has been constant he initially spit up proximally 250 cc of blood.  The bleeding is not brisk he is trached.  Vital signs are stable.  He has been suction his mouth out and has accumulated approximately 50 cc of dark blood in the bedside container.  He denies any difficulty breathing, syncope, vomiting, fevers or chills.        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     Past Surgical History:   Procedure Laterality Date    DIRECT LARYNGOBRONCHOSCOPY N/A 12/27/2021    Procedure: LARYNGOSCOPY, DIRECT, WITH BRONCHOSCOPY;  Surgeon: Flex Espinosa MD;  Location: [facility] OR [address] [place] [address];  Service: ENT;  Laterality: N/A;    DISSECTION OF NECK Bilateral 1/6/2022    Procedure: DISSECTION, NECK;  Surgeon: Jesse James MD;  [facility];  Service: ENT;  Laterality: Bilateral;    FLAP PROCEDURE Right 1/6/2022    Procedure: CREATION, FREE FLAP;  Surgeon: Alise Hart MD;  [facility];  Service: ENT;  Laterality: Right;  Ischemic start 1351  Ischemic stop 1502    LARYNGECTOMY N/A 1/6/2022    Procedure: LARYNGECTOMY;  Surgeon: Jesse James MD;  [facility];  Service: ENT;  Laterality: N/A;    LARYNGOSCOPY N/A 8/4/2020    Procedure: Suspension microlaryngoscopy with biopsy, possible KTP laser treatment/excision;  Surgeon: Stew Noel MD;  [facility];  Service: ENT;  Laterality: N/A;   Microscope, telescopes, tower, microinstruments, KTP laser, rep conf# [id number] IC 7/28.    LARYNGOSCOPY N/A 3/16/2021    Procedure: Suspension microlaryngoscopy with excision of lesion, possible CO2 laser;  Surgeon: Stew Noel MD;  [facility];  Service: ENT;  Laterality: N/A;  Microscope, telescopes, tower, microinstruments, CO2 laser, rep conf# [id number] IC 3/4.    LARYNGOSCOPY N/A 4/1/2021    Procedure: Suspension microlaryngoscopy with KTP laser excision of lesion;  Surgeon: Stew Noel MD;  [facility];  Service: ENT;  Laterality: N/A;  Microscope, telescopes, tower, microinstruments, 70 degree scope, vocal fold , KTP laser, rep conf# [id number] BC    LARYNGOSCOPY N/A 12/9/2021    Procedure: Suspension microlaryngoscopy with biopsy;  Surgeon: Stew Noel MD;  Location: [address];  Service: ENT;  Laterality: N/A;  Microscope, telescopes, tower, microinstruments    LARYNGOSCOPY N/A 1/6/2022    Procedure: LARYNGOSCOPY;  Surgeon: Jesse James MD;  Location: [address];  Service: ENT;  Laterality: N/A;    LARYNGOSCOPY N/A 4/27/2022    Procedure: LARYNGOSCOPY WITH BIOPSY;  Surgeon: Jesse James MD;  Location: [address];  Service: ENT;  Laterality: N/A;    MICROLARYNGOSCOPY N/A 3/17/2020    Procedure: MICROLARYNGOSCOPY;  Surgeon: Jung Xiao MD;  Location: [facility];  Service: ENT;  Laterality: N/A;  Laser Microlaryngoscopy  NEED TO SCHEDULE LASER from [facility] [id number]    REIMPLANTATION OF PARATHYROID TISSUE N/A 1/6/2022    Procedure: REIMPLANTATION, PARATHYROID TISSUE;  Surgeon: Jesse James MD;  [facility];  Service: ENT;  Laterality: N/A;    THYROIDECTOMY  1/6/2022    Procedure: THYROIDECTOMY;  Surgeon: Jesse James MD;  Location: [facility] OR 2ND FLR;  Service: ENT;;    TRACHEOSTOMY N/A 12/27/2021    Procedure: CREATION, TRACHEOSTOMY;  Surgeon: Flex Espinosa MD;  Location:  [facility] OR 2ND [facility];  Service: ENT;  Laterality: N/A;     Family History   Problem Relation Age of Onset    Abnormal EKG Mother     Diabetes Father     Heart disease Father     Hypertension Father      Social History     Tobacco Use    Smoking status: Never Smoker    Smokeless tobacco: Never Used   Substance Use Topics    Alcohol use: Not Currently     Comment: occasional    Drug use: No     Review of Systems   Unable to perform ROS: Patient nonverbal       Physical Exam     Initial Vitals [05/19/22 2109]   BP Pulse Resp Temp SpO2   134/74 97 20 98.9 °F (37.2 °C) 100 %      MAP       --         Physical Exam    Nursing note and vitals reviewed.  Constitutional: He appears well-developed and well-nourished. No distress.   HENT:   Head: Normocephalic and atraumatic.   Mouth/Throat: Oropharynx is clear and moist. No oropharyngeal exudate.   Eyes: Pupils are equal, round, and reactive to light. Right eye exhibits no discharge. Left eye exhibits no discharge.   Neck:       Normal range of motion.  Cardiovascular: Normal rate, regular rhythm and normal heart sounds.   No murmur heard.  Pulmonary/Chest: Breath sounds normal. No respiratory distress. He has no wheezes.   Abdominal: Abdomen is soft. Bowel sounds are normal. He exhibits no distension. There is no abdominal tenderness.   Musculoskeletal:         General: No tenderness or edema.      Cervical back: Normal range of motion.     Neurological: He is alert and oriented to person, place, and time. He has normal strength. GCS score is 15. GCS eye subscore is 4. GCS verbal subscore is 5. GCS motor subscore is 6.   Skin: Skin is warm. No erythema.         ED Course   Procedures  Labs Reviewed   CBC W/ AUTO DIFFERENTIAL - Abnormal; Notable for the following components:       Result Value    RBC 3.46 (*)     Hemoglobin 9.1 (*)     Hematocrit 28.5 (*)     MCH 26.3 (*)     MCHC 31.9 (*)     RDW 15.0 (*)     Lymph # 0.8 (*)     Gran % 80.3 (*)     Lymph % 11.2 (*)      All other components within normal limits   COMPREHENSIVE METABOLIC PANEL - Abnormal; Notable for the following components:    CO2 21 (*)     Glucose 127 (*)     BUN 30 (*)     Total Bilirubin 1.2 (*)     All other components within normal limits   PROTIME-INR - Abnormal; Notable for the following components:    PT 17.8 (*)     All other components within normal limits          Imaging Results          CTA Neck (Final result)  Result time 05/20/22 04:15:51    Final result by Suresh Collier MD (05/20/22 04:15:51)                 Narrative:    EXAM DESCRIPTION:  CTA NECK 5/20/2022 3:35 AM CDT    RadLex: CT NECK ANGIOGRAPHY WITH IV CONTRAST    CLINICAL HISTORY:  71 years Male, Head/neck cancer, assess treatment response.    TECHNIQUE:  During arterial phase imaging contiguous helical images were obtained from the upper lungs through the skull base. 2-D MIPs were performed.  No 3-D images were performed..    RADIATION DOSE REDUCTION: This exam was performed according to the departmental dose-optimization program which includes automated exposure control, adjustment of the mA and/or kV according to patient size and/or use of iterative reconstruction technique.    Images: 378    COMPARISON:  PET/CT from May 13, 2022. Previous CT scan of the neck from April 24, 2022    FINDINGS:  LUNG apices: Pleural parenchymal airspace disease is present superiorly and anteriorly within the upper lobes bilaterally.  This is similar to the previous study.  NECK soft tissues: Timing of the contrast bolus was performed during the arterial phase as part of angiography.  The patient is had surgical resection of much of the soft tissues within the anterior neck with a open defect in the lower chest above the manubrium to the trachea. No tracheostomy tube.  Some soft tissue within the trachea may be mucus. Surgical clips are present within the operative bed.  The airway above the open anterior neck defect has been resected, or filled in with  tumor  Within the hypopharynx within the region of the epiglottis there is a rim-enhancing mass with some peripheral calcification.  This is the same area where the lesion was present on the recent PET/CT.  This is the same area with there is a mass on the CT scan of April 24, 2022.  Due to the timing of the contrast bolus the mass was better demonstrated on the previous study.  The calcification associated with the mass remains.  There is some cavitation centrally within the mass.  Due to the decreased enhancement due to the early arterial phase imaging measuring the margins of the mass on today's study is more difficult.  I measure it at 3.3 x 2.8 cm. My attempts to measure the mass at the same level with cursor placement in similar positions, I measure it on the April 24, 2022 study at 2.7 x 2.5 cm, suggesting that it is increased in size.  Indistinctness extending into the lateral neck and the regions deep to the sternocleidomastoid muscle.  Additional scarring.  Submandibular glands remain.  The parotid glands remain but are atrophic.    BONES: Multilevel degenerative disc disease.  Facet arthropathy.  No osseous metastasis perceived.  Poor dentition.    AORTA: Elongation of the thoracic aorta without aneurysm or dissection.  Flow remains within the brachiocephalic artery and bilateral subclavian arteries.  There is some soft and calcific plaque circumferentially around the origin of the left subclavian artery possibly within the radiation field  RIGHT CCA: Calcific plaque is present throughout the right CCA without significant stenosis  RIGHT ICA: The carotid bulb is widely patent without significant disease.  The right ICA remains patent into the skull    LEFT CCA: Calcific plaque throughout the length of the left CCA.  LEFT ICA: The left carotid bulb is widely patent.  Left ICA is patent into the skull    RIGHT vertebral: The right vertebral artery is small, within normal variability..  After the branch of  "the right posterior inferior cerebellar artery there is stenosis to the right intracranial portions of the vertebral artery possibly with a short segment occlusion.  LEFT vertebral: The left vertebral artery is dominant.  Remains patent into the skull to form the preponderance of flow to the basilar artery    IMPRESSION:  Persistent mass within the hypopharynx consistent with malignancy.  By my measurements it is larger than on April 24, 2022 with central necrosis.  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 This is similar to the previous April 2022 study.  No evidence of arterial compromise related to malignancy.    Electronically signed by:  Suresh Collier MD  5/20/2022 4:15 AM CDT Workstation: [id number]                               Medications   iohexoL (OMNIPAQUE 350) injection 100 mL (70 mLs Intravenous Given 5/20/22 0338)     Medical Decision Making:   Initial Assessment:   71-year-old male presents to the ER for bleeding from his malignancy at the base of his tongue.  It is a slow venous ooze no bright red blood or hemodynamic instability his oropharynx is normal.  His stoma site is normal.  Has no increased work of breathing his breath sounds are clear his abdomen is soft  ED Management:  PGY4 MDM:   71-year-old male squamous cell carcinoma of base of the tongue coming in for continuous bleeding.  Unable to resolved during the ER observation.  He had continuing slow ooze from the site.  The patient was discussed with ENT at [facility] recommended a CT angio of the neck to evaluate for lingual artery dilation.  His CT showed no vascular involvement.  Will transfer him to [facility] for ENT evaluation.  He is agreeable with transfer his vital signs are stable    Yao Amador MD PGY4  "5/20/2022" 2250                Attending Attestation:   Physician Attestation Statement for Resident:  As the supervising MD   Physician Attestation " Statement: I have personally seen and examined this patient.   I agree with the above history. -:   As the supervising MD I agree with the above PE.    As the supervising MD I agree with the above treatment, course, plan, and disposition.   -:     71-year-old male with known squamous cell carcinoma of the base of his tongue returns emergency department with oozing from his posterior pharynx.  A hemodynamically stable here.  Hemoglobin is at baseline.  Discussed the case with ENT who requested CTA.  CTA does not show any active extravasation but does show necrosis of known mass.  Patient will be transferred to [facility] for [facility] evaluation.  Patient agrees with plan of care.    Lashon Dawn MD  Emergency Medicine  05/20/2022 5:05 AM      I have reviewed and agree with the residents interpretation of the following: lab data and CT scans.  I have reviewed the following: old records at this facility.                         Clinical Impression:   Final diagnoses:  [K13.79] Oral bleeding (Primary)  [C02.9] Squamous cell cancer of tongue          ED Disposition Condition    Transfer to Another Facility Stable              Lashon Dawn MD  05/20/22 2561

## 2022-05-20 NOTE — ASSESSMENT & PLAN NOTE
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 Previously admitted 5/15-5/16. Now presenting again with hemoptysis. On Eliquis.     -- Admit to SICU for observation  -- Hold Eliquis  -- Discussed with Neuro IR, will see if possible for left lingual artery embolization  -- NPO  -- Please Secure Chat me for any questions, page ENT on-call if unresponsive or urgent

## 2022-05-20 NOTE — CONSULTS
[facility] Dept  Otorhinolaryngology-Head & Neck Surgery  Consult Note    Patient Name: Cyrus Batres Jr.  MRN: [id number]  Code Status: Prior  Admission Date: 5/20/2022  Hospital Length of Stay: 0 days  Attending Physician: J Carlos Jay MD  Primary Care Provider: Jesse James MD    Patient information was obtained from patient and past medical records.     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 Patient previously admitted to our service on 5/15-5/16 for hemoptysis. The bleeding stabilized without any interventions and the patient was discharged. Patient reports bleeding episodes started again yesterday. CTA at that time with no obvious source of bleeding per read.          Medications:  Continuous Infusions:  Scheduled Meds:  PRN Meds:     Current Facility-Administered Medications on File Prior to Encounter   Medication    [COMPLETED] iohexoL (OMNIPAQUE 350) injection 100 mL    lactated ringers infusion     Current Outpatient Medications on File Prior to Encounter   Medication Sig    apixaban (ELIQUIS) 5 mg Tab Take 1 tablet (5 mg total) by mouth 2 (two) times daily.    aspirin (ECOTRIN) 81 MG EC tablet Take 81 mg by mouth Daily.    blood sugar diagnostic (BLOOD GLUCOSE TEST) Strp 1 strip by Misc.(Non-Drug; Combo Route) route 2 (two) times daily before meals.    esomeprazole (NEXIUM) 20 MG capsule Take 20 mg by mouth once daily.    levothyroxine (SYNTHROID) 100 MCG tablet Take 1 tablet (100 mcg total) by mouth before breakfast.    losartan (COZAAR) 100 MG tablet TAKE 1 TABLET BY MOUTH EVERY DAY (Patient taking differently: Take 100 mg by mouth every evening.)    pen needle, diabetic (BD  "ULTRA-FINE YULISSA PEN NEEDLE) 32 gauge x 5/32" Ndle Use once weekly. (Patient taking differently: 1 pen by Misc.(Non-Drug; Combo Route) route once a week. 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     Past Surgical History:   Procedure Laterality Date    DIRECT LARYNGOBRONCHOSCOPY N/A 12/27/2021    Procedure: LARYNGOSCOPY, DIRECT, WITH BRONCHOSCOPY;  Surgeon: Flex Espinosa MD;  [facility];  Service: ENT;  Laterality: N/A;    DISSECTION OF NECK Bilateral 1/6/2022    Procedure: DISSECTION, NECK;  Surgeon: Jesse James MD;  [facility];  Service: ENT;  Laterality: Bilateral;    FLAP PROCEDURE Right 1/6/2022    Procedure: CREATION, FREE FLAP;  Surgeon: Alise Hart MD;  [facility];  Service: ENT;  Laterality: Right;  Ischemic start 1351  Ischemic stop 1502    LARYNGECTOMY N/A 1/6/2022    Procedure: LARYNGECTOMY;  Surgeon: Jesse James MD;  [facility];  Service: ENT;  Laterality: N/A;    LARYNGOSCOPY N/A 8/4/2020    Procedure: Suspension microlaryngoscopy with biopsy, possible KTP laser treatment/excision;  Surgeon: Stew Noel MD;  [facility];  Service: ENT;  Laterality: N/A;  Microscope, telescopes, tower, microinstruments, KTP laser, rep conf# [id number] IC 7/28.    LARYNGOSCOPY N/A 3/16/2021    Procedure: Suspension microlaryngoscopy with excision of lesion, possible CO2 laser;  Surgeon: Stew Noel MD;  [facility];  Service: ENT;  Laterality: N/A;  Microscope, telescopes, tower, microinstruments, CO2 laser, rep conf# [id number] IC 3/4.    LARYNGOSCOPY N/A " 4/1/2021    Procedure: Suspension microlaryngoscopy with KTP laser excision of lesion;  Surgeon: Stew Noel MD;  [facility];  Service: ENT;  Laterality: N/A;  Microscope, telescopes, tower, microinstruments, 70 degree scope, vocal fold , KTP laser, rep conf# [id number] BC    LARYNGOSCOPY N/A 12/9/2021    Procedure: Suspension microlaryngoscopy with biopsy;  Surgeon: Stew Noel MD;  [facility];  Service: ENT;  Laterality: N/A;  Microscope, telescopes, tower, microinstruments    LARYNGOSCOPY N/A 1/6/2022    Procedure: LARYNGOSCOPY;  Surgeon: Jesse James MD;  [facility];  Service: ENT;  Laterality: N/A;    LARYNGOSCOPY N/A 4/27/2022    Procedure: LARYNGOSCOPY WITH BIOPSY;  Surgeon: Jesse James MD;  [address];  Service: ENT;  Laterality: N/A;    MICROLARYNGOSCOPY N/A 3/17/2020    Procedure: MICROLARYNGOSCOPY;  Surgeon: Jung Xiao MD;  Location: [facility];  Service: ENT;  Laterality: N/A;  Laser Microlaryngoscopy  NEED TO SCHEDULE LASER from [facility] [id number]    REIMPLANTATION OF PARATHYROID TISSUE N/A 1/6/2022    Procedure: REIMPLANTATION, PARATHYROID TISSUE;  Surgeon: Jesse James MD;  Location: [address];  Service: ENT;  Laterality: N/A;    THYROIDECTOMY  1/6/2022    Procedure: THYROIDECTOMY;  Surgeon: Jesse James MD;  [facility];  Service: ENT;;    TRACHEOSTOMY N/A 12/27/2021    Procedure: CREATION, TRACHEOSTOMY;  Surgeon: Flex Espinosa MD;  [facility];  Service: ENT;  Laterality: N/A;     Family History       Problem Relation (Age of Onset)    Abnormal EKG Mother    Diabetes Father    Heart disease Father    Hypertension Father          Tobacco Use    Smoking status: Never Smoker    Smokeless tobacco: Never Used   Substance and Sexual Activity    Alcohol use: Not Currently     Comment: occasional    Drug use: No    Sexual activity: Yes      Partners: Female     Review of Systems   HENT:  Negative for nosebleeds.    Respiratory:  Negative for shortness of breath and stridor.    Objective:     Vital Signs (Most Recent):  Temp: 97 °F (36.1 °C) (05/20/22 0650)  Pulse: 78 (05/20/22 0902)  Resp: 15 (05/20/22 0847)  BP: (!) 120/58 (05/20/22 0902)  SpO2: 100 % (05/20/22 0902)   Vital Signs (24h Range):  Temp:  [97 °F (36.1 °C)-98.9 °F (37.2 °C)] 97 °F (36.1 °C)  Pulse:  [72-97] 78  Resp:  [15-20] 15  SpO2:  [100 %] 100 %  BP: (120-134)/(58-76) 120/58     Weight: 63.5 kg (140 lb)  Body mass index is 22.6 kg/m².    Physical Exam  Resting comfortably on bed  Oral cavity with moist mucous membrane, blood clots suctioned from oropharynx  Laryngeal stoma intact, no blood    Significant Labs:  BMP:   Recent Labs   Lab 05/19/22  2350   *      CO2 21*   BUN 30*   CREATININE 1.2   CALCIUM 8.9     CBC:   Recent Labs   Lab 05/19/22  2350   WBC 7.41   RBC 3.46*   HGB 9.1*   HCT 28.5*      MCV 82   MCH 26.3*   MCHC 31.9*     Significant Diagnostics:  CT: I have reviewed all pertinent results/findings within the past 24 hours and my personal findings are:  possible bleeding source from left lingual artery      Assessment/Plan:     * Oral bleeding  71 y.o M known to ENT with 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 Previously admitted 5/15-5/16. Now presenting again with hemoptysis. On Eliquis.     -- Admit to SICU for observation  -- Hold Eliquis  -- Discussed with Neuro IR, will see if possible for left lingual artery embolization  -- NPO  -- Please Secure Chat me for any questions, page ENT on-call if unresponsive or urgent      VTE Risk Mitigation (From admission, onward)    None          Thank you for your consult. I will follow-up with patient. Please contact us if you have any additional questions.    Beni Resendiz MD  Otorhinolaryngology-Head & Neck Surgery  [facility]

## 2022-05-20 NOTE — H&P
[facility] - Surgical Intensive Care  Critical Care - Surgery  History & Physical    Patient Name: Cyrus Batres Jr.  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 Patient previously admitted to ENT service on 5/15-5/16 for hemoptysis. The bleeding stabilized when his anticoagulant was dc'd and the patient was discharged with resumption of his blood thinner. Patient reports bleeding episodes started again 5/19 and that he has coughed up approximately 300cc of blood. CTA at that time with no obvious source of bleeding per read. Patient admitted to SICU for airway monitoring in the setting of oropharyngeal bleed.    Hospital/ICU Course: Patient admitted to SICU for airway monitoring, scheduled for left lingual artery embolization with IR afternoon of 5/20.       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       Past Surgical History:   Procedure Laterality Date    DIRECT LARYNGOBRONCHOSCOPY N/A 12/27/2021    Procedure: LARYNGOSCOPY, DIRECT, WITH BRONCHOSCOPY;  Surgeon: Flex Espinosa MD;  Location: [facility] OR [address] [place] [address];  Service: ENT;  Laterality: N/A;    DISSECTION OF NECK Bilateral 1/6/2022    Procedure: DISSECTION, NECK;  Surgeon: Jesse James MD;  [facility];  Service: ENT;  Laterality: Bilateral;    FLAP PROCEDURE Right 1/6/2022    Procedure: CREATION, FREE FLAP;  Surgeon: Alise Hart MD;  Location: [facility] OR [address] [place] [address];  Service: ENT;  Laterality: Right;  Ischemic start 1351  Ischemic stop 1502    LARYNGECTOMY N/A 1/6/2022     Procedure: LARYNGECTOMY;  Surgeon: Jesse James MD;  [facility];  Service: ENT;  Laterality: N/A;    LARYNGOSCOPY N/A 8/4/2020    Procedure: Suspension microlaryngoscopy with biopsy, possible KTP laser treatment/excision;  Surgeon: Stew Noel MD;  [facility];  Service: ENT;  Laterality: N/A;  Microscope, telescopes, tower, microinstruments, KTP laser, rep conf# [id number] IC 7/28.    LARYNGOSCOPY N/A 3/16/2021    Procedure: Suspension microlaryngoscopy with excision of lesion, possible CO2 laser;  Surgeon: Stew Noel MD;  Location: [facility] OR [address] [place] [address];  Service: ENT;  Laterality: N/A;  Microscope, telescopes, tower, microinstruments, CO2 laser, rep conf# [id number] IC 3/4.    LARYNGOSCOPY N/A 4/1/2021    Procedure: Suspension microlaryngoscopy with KTP laser excision of lesion;  Surgeon: Stew Noel MD;  [facility];  Service: ENT;  Laterality: N/A;  Microscope, telescopes, tower, microinstruments, 70 degree scope, vocal fold , KTP laser, rep conf# [id number] BC    LARYNGOSCOPY N/A 12/9/2021    Procedure: Suspension microlaryngoscopy with biopsy;  Surgeon: Stew Noel MD;  Location: [facility] OR [address] [place] [address];  Service: ENT;  Laterality: N/A;  Microscope, telescopes, tower, microinstruments    LARYNGOSCOPY N/A 1/6/2022    Procedure: LARYNGOSCOPY;  Surgeon: Jesse James MD;  [facility];  Service: ENT;  Laterality: N/A;    LARYNGOSCOPY N/A 4/27/2022    Procedure: LARYNGOSCOPY WITH BIOPSY;  Surgeon: Jesse James MD;  [facility];  Service: ENT;  Laterality: N/A;    MICROLARYNGOSCOPY N/A 3/17/2020    Procedure: MICROLARYNGOSCOPY;  Surgeon: Jung Xiao MD;  [facility];  Service: ENT;  Laterality: N/A;  Laser Microlaryngoscopy  NEED TO SCHEDULE LASER from [facility] [id number]    REIMPLANTATION OF PARATHYROID TISSUE N/A 1/6/2022    Procedure: REIMPLANTATION, PARATHYROID TISSUE;   Surgeon: Jesse James MD;  [facility];  Service: ENT;  Laterality: N/A;    THYROIDECTOMY  1/6/2022    Procedure: THYROIDECTOMY;  Surgeon: Jesse James MD;  [facility];  Service: ENT;;    TRACHEOSTOMY N/A 12/27/2021    Procedure: CREATION, TRACHEOSTOMY;  Surgeon: Flex Espinosa MD;  [facility];  Service: ENT;  Laterality: N/A;       Review of patient's allergies indicates:   Allergen Reactions    Pollen extracts     Lovastatin Rash     Not confirmed but pt skeptical       Family History     Problem Relation (Age of Onset)    Abnormal EKG Mother    Diabetes Father    Heart disease Father    Hypertension Father        Tobacco Use    Smoking status: Never Smoker    Smokeless tobacco: Never Used   Substance and Sexual Activity    Alcohol use: Not Currently     Comment: occasional    Drug use: No    Sexual activity: Yes     Partners: Female      Review of Systems   Constitutional: Negative for activity change, appetite change, chills, diaphoresis, fatigue and fever.   HENT: Positive for sore throat. Negative for congestion, dental problem and facial swelling.         Hemoptysis   Respiratory: Negative for cough, choking and shortness of breath.    Cardiovascular: Negative for chest pain, palpitations and leg swelling.   Gastrointestinal: Negative for abdominal distention, abdominal pain, anal bleeding, constipation, diarrhea and nausea.   Endocrine: Negative for cold intolerance, heat intolerance, polydipsia, polyphagia and polyuria.   Musculoskeletal: Negative for arthralgias, back pain and gait problem.   Skin: Negative for color change and pallor.   Neurological: Positive for speech difficulty. Negative for dizziness, light-headedness, numbness and headaches.   Psychiatric/Behavioral: Negative for agitation, behavioral problems and confusion. The patient is not nervous/anxious.      Objective:     Vital Signs (Most Recent):  Temp: 97.9 °F (36.6 °C)  (05/20/22 1030)  Pulse: 75 (05/20/22 1230)  Resp: (!) 23 (05/20/22 1230)  BP: (!) 149/69 (05/20/22 1230)  SpO2: 100 % (05/20/22 1230) Vital Signs (24h Range):  Temp:  [97 °F (36.1 °C)-98.9 °F (37.2 °C)] 97.9 °F (36.6 °C)  Pulse:  [65-97] 75  Resp:  [13-23] 23  SpO2:  [99 %-100 %] 100 %  BP: (112-149)/(57-76) 149/69     Weight: 63.5 kg (140 lb)  Body mass index is 22.6 kg/m².      Intake/Output Summary (Last 24 hours) at 5/20/2022 1426  Last data filed at 5/20/2022 1053  Gross per 24 hour   Intake --   Output 300 ml   Net -300 ml       Physical Exam  Constitutional:       Appearance: Normal appearance.   HENT:      Head: Normocephalic.      Nose: Nose normal.      Mouth/Throat:      Mouth: Mucous membranes are moist.      Comments: Blood noted in oropharynx  Eyes:      Pupils: Pupils are equal, round, and reactive to light.   Neck:      Comments: Stoma CDI  Cardiovascular:      Rate and Rhythm: Normal rate and regular rhythm.      Pulses: Normal pulses.      Heart sounds: Normal heart sounds.   Pulmonary:      Effort: Pulmonary effort is normal.      Breath sounds: Normal breath sounds.   Abdominal:      General: Abdomen is flat. Bowel sounds are normal.      Palpations: Abdomen is soft.   Musculoskeletal:         General: Normal range of motion.      Cervical back: Normal range of motion.   Skin:     General: Skin is warm and dry.      Capillary Refill: Capillary refill takes less than 2 seconds.   Neurological:      General: No focal deficit present.      Mental Status: He is alert and oriented to person, place, and time. Mental status is at baseline.   Psychiatric:         Mood and Affect: Mood normal.         Vents:       Lines/Drains/Airways     Drain  Duration                Gastrostomy/Enterostomy 12/27/21 1152 Percutaneous endoscopic gastrostomy (PEG)  days          Airway  Duration                Laryngectomy (Do Not Remove) -- days         Airway - Non-Surgical 04/27/22 1410 23 days           Peripheral Intravenous Line  Duration                Peripheral IV - Single Lumen 05/19/22 2350 20 G Right;Posterior Forearm <1 day         Peripheral IV - Single Lumen 05/20/22 1217 20 G Anterior;Left Hand <1 day                Significant Labs:    CBC/Anemia Profile:  Recent Labs   Lab 05/19/22 2350 05/20/22  1113   WBC 7.41 5.79   HGB 9.1* 9.2*   HCT 28.5* 29.3*    262   MCV 82 83   RDW 15.0* 14.7*        Chemistries:  Recent Labs   Lab 05/19/22 2350 05/20/22  1113    140   K 4.3 4.0    109   CO2 21* 22*   BUN 30* 26*   CREATININE 1.2 0.9   CALCIUM 8.9 9.0   ALBUMIN 4.0 4.0   PROT 7.1 6.8   BILITOT 1.2* 1.2*   ALKPHOS 76 98   ALT 11 8*   AST 14 14   MG  --  2.0   PHOS  --  2.8       All pertinent labs within the past 24 hours have been reviewed.    Significant Imaging: I have reviewed all pertinent imaging results/findings within the past 24 hours.    Assessment/Plan:     Active Diagnoses:    Diagnosis Date Noted POA    PRINCIPAL PROBLEM:  Oral bleeding [K13.79] 05/20/2022 Yes      Problems Resolved During this Admission:         Neuro/Psych:   -- Sedation: None  -- Pain: Tylenol 650mg PRN             Cards:   -- HDS  -- Hx afib, on apixaban and aspirin at home, holding in setting of acute bleed  -- Hx HTN, on losartan 100mg at home, holding in acute setting      Pulm:   -- Goal O2 sat > 90%  -- Wean as able      Renal:  -- BUN/Cr 26/0.9  -- No mahan      FEN / GI:   -- Net -300  -- Hx GERD, esomeprazole 20mg home med.    -Pantoprazole 40  -- Replace lytes as needed  -- Nutrition: NPO in setting of oropharyngeal bleed      ID:   -- Tm: afebrile; WBC 5.79  -- Abx Not indicated      Heme/Onc:   -- H/H stable 9.2/29.3  -- Daily CBC      Endo:   -- Hx hypothyroid: home synthroid 100mcg  -- Hx DM2  -- Gluc goal 140-180  -- SSI       PPx:   Feeding: NPO  Analgesia/Sedation: PRN Tylenol / None indicated   Thromboembolic prevention: held in setting of bleed  HOB >30: Yes  Stress Ulcer ppx:  PPI  Glucose control: Critical care goal 140-180 g/dl, ISS    Lines/Drains/Airway: 2 PIV, PEG tube, laryngectomy      Dispo/Code Status/Palliative:   -- SICU pending IR embolization / Full Code     Critical care was time spent personally by me on the following activities: development of treatment plan with patient or surrogate and bedside caregivers, discussions with consultants, evaluation of patient's response to treatment, examination of patient, ordering and performing treatments and interventions, ordering and review of laboratory studies, ordering and review of radiographic studies, pulse oximetry, re-evaluation of patient's condition.  This critical care time did not overlap with that of any other provider or involve time for any procedures.     Artemio Bernal,   Critical Care - Surgery  [facility]

## 2022-05-20 NOTE — PLAN OF CARE
Hemostasis obtained @1445. 2 hours of flat time ordered by MD. Pt tolerated procedure well. RIGHT groin site CDI without bleeding, swelling, or hematoma noted. Pt educated R/T importance of remaining flat without moving his/her RIGHT leg, notifying staff of any sensation of pain, wetness, or visualizing bleeding from the site.

## 2022-05-21 LAB
ALBUMIN SERPL BCP-MCNC: 3.7 G/DL (ref 3.5–5.2)
ALP SERPL-CCNC: 86 U/L (ref 55–135)
ALT SERPL W/O P-5'-P-CCNC: 10 U/L (ref 10–44)
ANION GAP SERPL CALC-SCNC: 10 MMOL/L (ref 8–16)
AST SERPL-CCNC: 12 U/L (ref 10–40)
BASOPHILS # BLD AUTO: 0.03 K/UL (ref 0–0.2)
BASOPHILS NFR BLD: 0.4 % (ref 0–1.9)
BILIRUB SERPL-MCNC: 0.9 MG/DL (ref 0.1–1)
BUN SERPL-MCNC: 25 MG/DL (ref 8–23)
CALCIUM SERPL-MCNC: 8.8 MG/DL (ref 8.7–10.5)
CHLORIDE SERPL-SCNC: 108 MMOL/L (ref 95–110)
CO2 SERPL-SCNC: 22 MMOL/L (ref 23–29)
CREAT SERPL-MCNC: 0.9 MG/DL (ref 0.5–1.4)
DIFFERENTIAL METHOD: ABNORMAL
EOSINOPHIL # BLD AUTO: 0.2 K/UL (ref 0–0.5)
EOSINOPHIL NFR BLD: 2.4 % (ref 0–8)
ERYTHROCYTE [DISTWIDTH] IN BLOOD BY AUTOMATED COUNT: 14.6 % (ref 11.5–14.5)
EST. GFR  (AFRICAN AMERICAN): >60 ML/MIN/1.73 M^2
EST. GFR  (NON AFRICAN AMERICAN): >60 ML/MIN/1.73 M^2
GLUCOSE SERPL-MCNC: 108 MG/DL (ref 70–110)
HCT VFR BLD AUTO: 27.3 % (ref 40–54)
HGB BLD-MCNC: 8.6 G/DL (ref 14–18)
IMM GRANULOCYTES # BLD AUTO: 0.03 K/UL (ref 0–0.04)
IMM GRANULOCYTES NFR BLD AUTO: 0.4 % (ref 0–0.5)
LYMPHOCYTES # BLD AUTO: 0.7 K/UL (ref 1–4.8)
LYMPHOCYTES NFR BLD: 10.2 % (ref 18–48)
MCH RBC QN AUTO: 26.1 PG (ref 27–31)
MCHC RBC AUTO-ENTMCNC: 31.5 G/DL (ref 32–36)
MCV RBC AUTO: 83 FL (ref 82–98)
MONOCYTES # BLD AUTO: 0.4 K/UL (ref 0.3–1)
MONOCYTES NFR BLD: 6.5 % (ref 4–15)
NEUTROPHILS # BLD AUTO: 5.4 K/UL (ref 1.8–7.7)
NEUTROPHILS NFR BLD: 80.1 % (ref 38–73)
NRBC BLD-RTO: 0 /100 WBC
PLATELET # BLD AUTO: 259 K/UL (ref 150–450)
PMV BLD AUTO: 10.7 FL (ref 9.2–12.9)
POCT GLUCOSE: 116 MG/DL (ref 70–110)
POCT GLUCOSE: 119 MG/DL (ref 70–110)
POCT GLUCOSE: 125 MG/DL (ref 70–110)
POCT GLUCOSE: 148 MG/DL (ref 70–110)
POCT GLUCOSE: 244 MG/DL (ref 70–110)
POTASSIUM SERPL-SCNC: 4.5 MMOL/L (ref 3.5–5.1)
PROT SERPL-MCNC: 6.2 G/DL (ref 6–8.4)
RBC # BLD AUTO: 3.3 M/UL (ref 4.6–6.2)
SODIUM SERPL-SCNC: 140 MMOL/L (ref 136–145)
WBC # BLD AUTO: 6.79 K/UL (ref 3.9–12.7)

## 2022-05-21 PROCEDURE — 94761 N-INVAS EAR/PLS OXIMETRY MLT: CPT

## 2022-05-21 PROCEDURE — 99213 OFFICE O/P EST LOW 20 MIN: CPT | Mod: ,,, | Performed by: SURGERY

## 2022-05-21 PROCEDURE — 96361 HYDRATE IV INFUSION ADD-ON: CPT | Performed by: EMERGENCY MEDICINE

## 2022-05-21 PROCEDURE — 85025 COMPLETE CBC W/AUTO DIFF WBC: CPT | Performed by: STUDENT IN AN ORGANIZED HEALTH CARE EDUCATION/TRAINING PROGRAM

## 2022-05-21 PROCEDURE — 96360 HYDRATION IV INFUSION INIT: CPT | Performed by: EMERGENCY MEDICINE

## 2022-05-21 PROCEDURE — 99213 PR OFFICE/OUTPT VISIT, EST, LEVL III, 20-29 MIN: ICD-10-PCS | Mod: ,,, | Performed by: SURGERY

## 2022-05-21 PROCEDURE — 99900035 HC TECH TIME PER 15 MIN (STAT)

## 2022-05-21 PROCEDURE — 25000003 PHARM REV CODE 250: Performed by: STUDENT IN AN ORGANIZED HEALTH CARE EDUCATION/TRAINING PROGRAM

## 2022-05-21 PROCEDURE — 63600175 PHARM REV CODE 636 W HCPCS: Performed by: STUDENT IN AN ORGANIZED HEALTH CARE EDUCATION/TRAINING PROGRAM

## 2022-05-21 PROCEDURE — G0378 HOSPITAL OBSERVATION PER HR: HCPCS

## 2022-05-21 PROCEDURE — 80053 COMPREHEN METABOLIC PANEL: CPT | Performed by: STUDENT IN AN ORGANIZED HEALTH CARE EDUCATION/TRAINING PROGRAM

## 2022-05-21 RX ORDER — SODIUM CHLORIDE, SODIUM LACTATE, POTASSIUM CHLORIDE, CALCIUM CHLORIDE 600; 310; 30; 20 MG/100ML; MG/100ML; MG/100ML; MG/100ML
INJECTION, SOLUTION INTRAVENOUS CONTINUOUS
Status: DISCONTINUED | OUTPATIENT
Start: 2022-05-21 | End: 2022-05-21

## 2022-05-21 RX ADMIN — LEVOTHYROXINE SODIUM 100 MCG: 100 TABLET ORAL at 06:05

## 2022-05-21 RX ADMIN — PANTOPRAZOLE SODIUM 40 MG: 40 TABLET, DELAYED RELEASE ORAL at 09:05

## 2022-05-21 RX ADMIN — SODIUM CHLORIDE, SODIUM LACTATE, POTASSIUM CHLORIDE, AND CALCIUM CHLORIDE: .6; .31; .03; .02 INJECTION, SOLUTION INTRAVENOUS at 06:05

## 2022-05-21 NOTE — PLAN OF CARE
05/21/22 1143   AGUIRRE Message   Medicare Outpatient and Observation Notification regarding financial responsibility Given to patient/caregiver;Explained to patient/caregiver;Signed/date by patient/caregiver   Date AGUIRRE was signed 05/21/22   Time AGUIRRE was signed 1058       Galina Martinez RN  [facility]  - [facility]  [phone]

## 2022-05-21 NOTE — ASSESSMENT & PLAN NOTE
  Neuro/Psych:   -- Sedation: None  -- Pain: Tylenol 650mg PRN             Cards:   -- HDS  -- Hx afib, on apixaban and aspirin at home, holding in setting of acute bleed  -- Hx HTN, on losartan 100mg at home, holding in acute setting and restart when appropriate       Pulm:   -- Goal O2 sat > 90%  -- Wean as able      Renal:  -- BUN/Cr stable   -- No mahan      FEN / GI:   -- Net -300  -- Hx GERD, esomeprazole 20mg home med.               -Pantoprazole 40  -- Replace lytes as needed  -- Nutrition: NPO in setting of oropharyngeal bleed      ID:   -- Tm: afebrile; WBC 5.79  -- Abx Not indicated      Heme/Onc:   -- H/H stable 9.2/29.3  -- Daily CBC  -- PT elevated, likely in setting of eliquis use at home. Heme curbsided by ENT       Endo:   -- Hx hypothyroid: home synthroid 100mcg  -- Hx DM2  -- Gluc goal 140-180      PPx:   Feeding: dysphagia diet   Analgesia/Sedation: PRN Tylenol / None indicated   Thromboembolic prevention: held in setting of bleed  HOB >30: Yes  Stress Ulcer ppx: PPI  Glucose control: Critical care goal 140-180 g/dl     Lines/Drains/Airway: 2 PIV, PEG tube, laryngectomy      Dispo/Code Status/Palliative:   -- SICU pending IR embolization / Full Code

## 2022-05-21 NOTE — SUBJECTIVE & OBJECTIVE
Interval History: NAEON. No further bleeding from mouth. Eliquis held. Awaiting heme lab results.    Medications:  Continuous Infusions:   lactated ringers 125 mL/hr at 05/21/22 0620     Scheduled Meds:   levothyroxine  100 mcg Oral Before breakfast    pantoprazole  40 mg Oral Daily     PRN Meds:acetaminophen, calcium gluconate IVPB, calcium gluconate IVPB, calcium gluconate IVPB, dextrose 10%, dextrose 10%, glucagon (human recombinant), glucose, glucose, magnesium sulfate IVPB, magnesium sulfate IVPB, melatonin, ondansetron, potassium chloride **AND** potassium chloride **AND** potassium chloride, prochlorperazine, senna-docusate 8.6-50 mg, sodium chloride 0.9%, sodium chloride 0.9%     Review of patient's allergies indicates:   Allergen Reactions    Pollen extracts     Lovastatin Rash     Not confirmed but pt skeptical     Objective:     Vital Signs (24h Range):  Temp:  [97.9 °F (36.6 °C)-98.3 °F (36.8 °C)] 98.2 °F (36.8 °C)  Pulse:  [] 76  Resp:  [10-29] 21  SpO2:  [96 %-100 %] 100 %  BP: (105-149)/(55-75) 123/58     Lines/Drains/Airways       Drain  Duration                  Gastrostomy/Enterostomy 12/27/21 1152 Percutaneous endoscopic gastrostomy (PEG)  days              Airway  Duration                  Laryngectomy (Do Not Remove) -- days         Airway - Non-Surgical 04/27/22 1410 23 days              Peripheral Intravenous Line  Duration                  Peripheral IV - Single Lumen 05/19/22 2350 20 G Right;Posterior Forearm 1 day         Peripheral IV - Single Lumen 05/20/22 1217 20 G Anterior;Left Hand <1 day                  Physical Exam  Resting comfortably on bed  Oral cavity with moist mucous membrane, no blood clots  Laryngeal stoma intact, no blood    Significant Labs:  Recent Lab Results  (Last 5 results in the past 24 hours)        05/21/22  0622   05/21/22  0344   05/20/22  2133   05/20/22  1830   05/20/22  1828        Albumin   3.7             Alkaline Phosphatase   86              ALT   10             Anion Gap   10             AST   12             Baso #   0.03             Basophil %   0.4             BILIRUBIN TOTAL   0.9  Comment: For infants and newborns, interpretation of results should be based  on gestational age, weight and in agreement with clinical  observations.    Premature Infant recommended reference ranges:  Up to 24 hours.............<8.0 mg/dL  Up to 48 hours............<12.0 mg/dL  3-5 days..................<15.0 mg/dL  6-29 days.................<15.0 mg/dL               BUN   25             Calcium   8.8             Chloride   108             CO2   22             Creatinine   0.9             Differential Method   Automated             eGFR if    >60.0             eGFR if non    >60.0  Comment: Calculation used to obtain the estimated glomerular filtration  rate (eGFR) is the CKD-EPI equation.                Eos #   0.2             Eosinophil %   2.4             Fibrinogen         452       Glucose   108             Gran # (ANC)   5.4             Gran %   80.1             Hematocrit   27.3             Hemoglobin   8.6             Immature Grans (Abs)   0.03  Comment: Mild elevation in immature granulocytes is non specific and   can be seen in a variety of conditions including stress response,   acute inflammation, trauma and pregnancy. Correlation with other   laboratory and clinical findings is essential.               Immature Granulocytes   0.4             Lymph #   0.7             Lymph %   10.2             MCH   26.1             MCHC   31.5             MCV   83             Mono #   0.4             Mono %   6.5             MPV   10.7             nRBC   0             Platelets   259             POCT Glucose 119     244   99         Potassium   4.5             PROTEIN TOTAL   6.2             RBC   3.30             RDW   14.6             Sodium   140             WBC   6.79                                  Significant Diagnostics:  None

## 2022-05-21 NOTE — SUBJECTIVE & OBJECTIVE
Interval History/Significant Events: NAEON. VSS. Patient went with IR yesterday, no bleed appreciated. No signs of bleeding overnight. H/H 9.2 > 8.6.  Stable for stepdown today.         Objective:     Vital Signs (Most Recent):  Temp: 98.3 °F (36.8 °C) (05/21/22 0330)  Pulse: 63 (05/21/22 0515)  Resp: 13 (05/21/22 0515)  BP: 120/66 (05/21/22 0515)  SpO2: 99 % (05/21/22 0515) Vital Signs (24h Range):  Temp:  [97 °F (36.1 °C)-98.3 °F (36.8 °C)] 98.3 °F (36.8 °C)  Pulse:  [] 63  Resp:  [10-29] 13  SpO2:  [96 %-100 %] 99 %  BP: (107-149)/(55-75) 120/66     Weight: 65.5 kg (144 lb 6.4 oz)  Body mass index is 23.31 kg/m².      Intake/Output Summary (Last 24 hours) at 5/21/2022 0647  Last data filed at 5/21/2022 0320  Gross per 24 hour   Intake 100 ml   Output 750 ml   Net -650 ml       Physical Exam  Vitals reviewed.   Constitutional:       General: He is not in acute distress.  HENT:      Head: Normocephalic and atraumatic.   Eyes:      General: No scleral icterus.     Extraocular Movements: Extraocular movements intact.   Neck:      Comments: Stoma CDI   Cardiovascular:      Rate and Rhythm: Normal rate.      Heart sounds: Normal heart sounds.   Pulmonary:      Effort: Pulmonary effort is normal.      Breath sounds: Normal breath sounds.   Abdominal:      General: There is no distension.      Palpations: Abdomen is soft. There is no mass.      Tenderness: There is no abdominal tenderness.      Hernia: No hernia is present.   Musculoskeletal:         General: Normal range of motion.      Cervical back: Normal range of motion and neck supple.      Right lower leg: No edema.      Left lower leg: No edema.   Skin:     General: Skin is warm and dry.      Capillary Refill: Capillary refill takes less than 2 seconds.   Neurological:      General: No focal deficit present.      Mental Status: He is alert and oriented to person, place, and time.   Psychiatric:         Mood and Affect: Mood normal.         Behavior:  Behavior normal.       Vents:       Lines/Drains/Airways       Drain  Duration                  Gastrostomy/Enterostomy 12/27/21 1152 Percutaneous endoscopic gastrostomy (PEG)  days              Airway  Duration                  Laryngectomy (Do Not Remove) -- days         Airway - Non-Surgical 04/27/22 1410 23 days              Peripheral Intravenous Line  Duration                  Peripheral IV - Single Lumen 05/19/22 2350 20 G Right;Posterior Forearm 1 day         Peripheral IV - Single Lumen 05/20/22 1217 20 G Anterior;Left Hand <1 day                    Significant Labs:    CBC/Anemia Profile:  Recent Labs   Lab 05/19/22 2350 05/20/22  1113 05/21/22  0344   WBC 7.41 5.79 6.79   HGB 9.1* 9.2* 8.6*   HCT 28.5* 29.3* 27.3*    262 259   MCV 82 83 83   RDW 15.0* 14.7* 14.6*        Chemistries:  Recent Labs   Lab 05/19/22 2350 05/20/22  1113 05/21/22  0344    140 140   K 4.3 4.0 4.5    109 108   CO2 21* 22* 22*   BUN 30* 26* 25*   CREATININE 1.2 0.9 0.9   CALCIUM 8.9 9.0 8.8   ALBUMIN 4.0 4.0 3.7   PROT 7.1 6.8 6.2   BILITOT 1.2* 1.2* 0.9   ALKPHOS 76 98 86   ALT 11 8* 10   AST 14 14 12   MG  --  2.0  --    PHOS  --  2.8  --        All pertinent labs within the past 24 hours have been reviewed.    Significant Imaging:  I have reviewed all pertinent imaging results/findings within the past 24 hours.

## 2022-05-21 NOTE — ASSESSMENT & PLAN NOTE
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 Previously admitted 5/15-5/16. Now presenting again with hemoptysis. S/p IR angiogram with no active extravasation noted. Patient with persistently elevated PT. On Eliquis but held since admission.     -- Awaiting heme lab results (fibrinogen, mixing studies PT/aPTT)  -- Full liquid vs puree diet  -- Okay to stepdown to floor under ENT  -- Please Secure Chat me for any questions, page ENT on-call if unresponsive or urgent  -- Dispo: Likely discharge on Monday pending normal lab studies   HOSPITALIST PROGRESS NOTE:     Date of service: 4/30/2017    Medical Problems/ Reason for Visit:   Acute Hypoxemic and Hypercarbic respiratory failure   Acute COPD exacerbation   Influenza B respiratory infection with pneumonia   Acute Pulmonary edema  Acute systolic Heart failure/Stress induced - Takostubo cardiomyopathy   Non ST elevation Myocardial infarction   Hypotension/cardiogenic shock   Acute renal failure , Hypernatremia     Subjective :   Intubated and sedated   She is on FIO2 of 60 % , low grade fever of 100.4 noted overnight.   Discussed with nursing staff.       Objective:      Vital 24 Hour Range Last Value   Temperature Temp  Min: 98.4 °F (36.9 °C)  Max: 100.4 °F (38 °C) 99.3 °F (37.4 °C) (04/30/17 0330)   Pulse Pulse  Min: 83  Max: 100 100 (04/30/17 0330)   Respiratory Resp  Min: 24  Max: 24 24 (04/30/17 0030)   Non-Invasive  Blood Pressure No Data Recorded 151/81 (04/29/17 0200)   Pulse Oximetry SpO2  Min: 92 %  Max: 98 % 97 % (04/30/17 0706)     Admit Weight:   Weight: 79.4 kg (04/23/17 1720)    Weight over the past 48 Hours:  Patient Vitals for the past 48 hrs:   Weight   04/29/17 0535 87.7 kg   04/30/17 0600 86.5 kg        BMI:   BMI (Calculated): 33.14 (04/23/17 1720)    Intake/Output:    Last Stool Occurrence: 5 (inc bm) (04/25/17 0544)         I/O last 3 completed shifts:  In: 1949.8 [I.V.:574.8; NG/GT:1375]  Out: 2030 [Urine:2030]    Physical Exam:     GENERAL: intubated and sedated.   ENMT: Orally intubated. Tube feeding is running via OG tube.   NECK: Supple. No JVD, No neck mass, No thyromegaly.   RESPIRATORY: Intubated , Good air entry bilateral, scattered rhonchi,  No crackles.   HEART: Regular rate and rhythm. Soft systolic murmur, No gallops, Trace  pedal edema   ABDOMEN: Non distended, soft and nontender , active bowel sounds, no hepatosplenomegaly.   MUSCULOSKELETAL: No Joint swelling noted.   NEUROLOGIC: intubated and sedated, not following commands.     Laboratory  Results:      Recent Labs  Lab 04/30/17  0542 04/29/17  0500 04/28/17  0515  04/27/17  1745 04/27/17  1130  04/23/17  2212 04/23/17  1725   SODIUM 150* 152* 153*  --   --   --   < > 145 137   POTASSIUM 5.1 4.6 4.1  < >  --   --   < > 4.2 4.5   CHLORIDE 115* 117* 116*  --   --   --   < > 115* 109*   CO2 34* 31 28  --   --   --   < > 18* 14*   BUN 41* 43* 45*  --   --   --   < > 33* 39*   CREATININE 0.81 0.89 1.12*  --   --   --   < > 1.66* 1.82*   GLUCOSE 211* 187* 191*  --   --   --   < > 104* 105*   WBC 13.4* 12.5* 5.9  --   --   --   < >  --  9.6   HGB 11.2* 11.1* 10.4*  --   --   --   < >  --  12.2   HCT 37.7 36.8 34.7*  --   --   --   < >  --  37.7    268 195  --   --   --   < >  --  255   PT  --   --   --   --   --   --   --  11.3 11.2   INR  --   --   --   --   --   --   --  1.0 1.0   PTT  --   --  43*  --  49* 56*  < >  --  32*   < > = values in this interval not displayed.      Recent Labs  Lab 04/26/17  1630 04/25/17  2044 04/25/17  1233   RAPDTR 2.80* 4.80* 5.20*       Medications/Infusions:  Scheduled:   • aspirin  81 mg Per NG tube Daily   • captopril  12.5 mg Per NG tube 3 times per day   • esomeprazole  40 mg Per NG tube 2 times per day   • methylPREDNISolone  40 mg Intravenous 2 times per day   • enoxaparin (LOVENOX) injection  40 mg Subcutaneous Daily   • carvedilol  3.125 mg Per NG tube BID WC   • atorvastatin  40 mg Per NG tube Nightly   • gabapentin  100 mg Per NG tube TID   • sertraline  100 mg Per NG tube Daily   • chlorhexidine gluconate  15 mL Swish & Spit 2 times per day   • petrolatum(white)/mineral oil/NaCL  1 application Both Eyes 6 times per day   • insulin regular (human)   Subcutaneous 4 times per day   • oseltamivir  30 mg Per NG tube 2 times per day   • albuterol-ipratropium 2.5 mg/0.5 mg  3 mL Nebulization 4x Daily Resp   • fluticasone-salmeterol  1 puff Inhalation BID Resp   • sodium chloride (PF)  2 mL Injection 2 times per day   • nicotine  1 patch Transdermal Daily    And    • nicotine  1 patch Transdermal Daily       Continuous Infusions:   • fentaNYL 1000 mcg in sodium chloride 0.9% 100 mL infusion premix 50 mcg/hr (04/30/17 0237)   • dextrose 5 % infusion     • MIDAZolam (VERSED) 100 mg in sodium chloride 0.9% 100 mL infusion 8 mg/hr (04/29/17 2110)   • sodium chloride 0.9% infusion     • sodium chloride 0.9% infusion       Assessment and Plan:  Acute Hypoxemic and Hypercarbic respiratory failure   Acute COPD exacerbation   Influenza B respiratory infection with pneumonia   Acute Pulmonary edema, Improving   Intubated on 04/25/17   Continue on ventilator support and attempt of PS trial as per Pulmonary/intensivist.   Continue on solumedrol   Continue on Oseltamivir   Completed 7 days course of IV Rocephin + Zithromax.  Diuresis as needed as per Intensivist and Nephrology    Acute systolic Heart failure/Stress induced - Takostubo cardiomyopathy   Non ST elevation Myocardial infarction   Hypotension/cardiogenic shock - improved   Troponin was peaked at 5.2   Echo showed Decreased EF of 34 % with findings of Takostubo cardiomyopathy   Continue on Aspirin and Statin   Completed 48 hours of IV Heparin therapy   Started on Captopril and Coreg as per cardiology.   Seen and followed by cardiology     Acute renal failure , Hypernatremia   Creatinine is overall stabilized , sodium level is getting better.   Nephrology evaluation appreciated.   To continue on Free water via feeding tube.   Monitor BMP     DVT Prophylaxis - SC Lovenox and SCD/TEDs  GI Prophylaxis - IV Famotidine     Patient overall remains in critical condition.   -------------------------------------------------------------------------------------------------------------------  Care Plan discussed with and/or Notes reviewed from :  RN and consultants.   Attempted to contact cortney Potter to give him update but not response on phone.     Advance Directives and Code status :   Patient`[facility]    Patient`s code status is DNR-MMS  Family is aware about overall guarded prognosis.   If patient continues to fail weaning trial in next few days then will need discussion about tracheostomy Vs terminal weaning and comfort care.     Code status: No Resuscitation with Maximal Medical Support      Signed:  Parrish Kirkpatrick MD  4/30/2017  8:23 AM     Addendum :   Discussed with son - Tariq over the phone that patient has failed PS weaning trial over past 2 days and prognosis for weaning successfully from ventilator machine is guarded at best . Son reported that family will not proceed with tracheostomy given patient`s prior wishes known to family.   If patient continued to fail weaning trial in next few days then family will most likely opt for terminal weaning from ventilator.   Family meeting arranged with son on Wednesday : 05/03/17 @ 11.00 AM      Parrish Kirkpatrick MD  4/30/2017

## 2022-05-21 NOTE — PLAN OF CARE
"SICU PLAN OF CARE NOTE    Dx: Oral bleeding    Goals of Care:  MAP >65    Vital Signs:  BP (!) 110/56   Pulse 69   Temp 98.3 °F (36.8 °C) (Oral)   Resp 14   Ht 5' 6" (1.676 m)   Wt 63.5 kg (140 lb)   SpO2 99%   BMI 22.60 kg/m²     Cardiac:  NSR    Resp:  Room Air     Neuro:  AAO x4, Follows Commands, and Moves All Extremities    Urine Output:  Voids Spontaneously 450 cc/shift    Diet:  Pureed     Labs/Accuchecks:  Labs Reviewed     Drips: Lactated Ringers @125 ml/hr    Skin:  All skin remains free from new injury.  Patient turned Q2, waffle mattress inflated, ICU bed working correctly.    Shift Events:  Patient alert and orient X4. No signs or symptoms of bleeding noted. No spitting up secretions at this time. Mouths needs as well as use communication pad/writing. Complains of no pain or discomfort. Moves self in bed.  See flowsheet for further assessment/details.  Patient updated on current condition/plan of care, questions answered, and emotional support provided.   MD updated on current condition, vitals, labs, and gtts.  No new orders received.     "

## 2022-05-21 NOTE — PROGRESS NOTES
[facility]  Otorhinolaryngology-Head & Neck Surgery  Progress Note    Subjective:     Post-Op Info:  * No surgery found *      Hospital Day: 2     Interval History: NAEON. No further bleeding from mouth. Eliquis held. Awaiting heme lab results.    Medications:  Continuous Infusions:   lactated ringers 125 mL/hr at 05/21/22 0620     Scheduled Meds:   levothyroxine  100 mcg Oral Before breakfast    pantoprazole  40 mg Oral Daily     PRN Meds:acetaminophen, calcium gluconate IVPB, calcium gluconate IVPB, calcium gluconate IVPB, dextrose 10%, dextrose 10%, glucagon (human recombinant), glucose, glucose, magnesium sulfate IVPB, magnesium sulfate IVPB, melatonin, ondansetron, potassium chloride **AND** potassium chloride **AND** potassium chloride, prochlorperazine, senna-docusate 8.6-50 mg, sodium chloride 0.9%, sodium chloride 0.9%     Review of patient's allergies indicates:   Allergen Reactions    Pollen extracts     Lovastatin Rash     Not confirmed but pt skeptical     Objective:     Vital Signs (24h Range):  Temp:  [97.9 °F (36.6 °C)-98.3 °F (36.8 °C)] 98.2 °F (36.8 °C)  Pulse:  [] 76  Resp:  [10-29] 21  SpO2:  [96 %-100 %] 100 %  BP: (105-149)/(55-75) 123/58     Lines/Drains/Airways       Drain  Duration                  Gastrostomy/Enterostomy 12/27/21 1152 Percutaneous endoscopic gastrostomy (PEG)  days              Airway  Duration                  Laryngectomy (Do Not Remove) -- days         Airway - Non-Surgical 04/27/22 1410 23 days              Peripheral Intravenous Line  Duration                  Peripheral IV - Single Lumen 05/19/22 2350 20 G Right;Posterior Forearm 1 day         Peripheral IV - Single Lumen 05/20/22 1217 20 G Anterior;Left Hand <1 day                  Physical Exam  Resting comfortably on bed  Oral cavity with moist mucous membrane, no blood clots  Laryngeal stoma intact, no blood    Significant Labs:  Recent Lab Results  (Last 5 results in the  past 24 hours)        05/21/22  0622   05/21/22  0344   05/20/22  2133   05/20/22  1830   05/20/22  1828        Albumin   3.7             Alkaline Phosphatase   86             ALT   10             Anion Gap   10             AST   12             Baso #   0.03             Basophil %   0.4             BILIRUBIN TOTAL   0.9  Comment: For infants and newborns, interpretation of results should be based  on gestational age, weight and in agreement with clinical  observations.    Premature Infant recommended reference ranges:  Up to 24 hours.............<8.0 mg/dL  Up to 48 hours............<12.0 mg/dL  3-5 days..................<15.0 mg/dL  6-29 days.................<15.0 mg/dL               BUN   25             Calcium   8.8             Chloride   108             CO2   22             Creatinine   0.9             Differential Method   Automated             eGFR if    >60.0             eGFR if non    >60.0  Comment: Calculation used to obtain the estimated glomerular filtration  rate (eGFR) is the CKD-EPI equation.                Eos #   0.2             Eosinophil %   2.4             Fibrinogen         452       Glucose   108             Gran # (ANC)   5.4             Gran %   80.1             Hematocrit   27.3             Hemoglobin   8.6             Immature Grans (Abs)   0.03  Comment: Mild elevation in immature granulocytes is non specific and   can be seen in a variety of conditions including stress response,   acute inflammation, trauma and pregnancy. Correlation with other   laboratory and clinical findings is essential.               Immature Granulocytes   0.4             Lymph #   0.7             Lymph %   10.2             MCH   26.1             MCHC   31.5             MCV   83             Mono #   0.4             Mono %   6.5             MPV   10.7             nRBC   0             Platelets   259             POCT Glucose 119     244   99         Potassium   4.5             PROTEIN  TOTAL   6.2             RBC   3.30             RDW   14.6             Sodium   140             WBC   6.79                                  Significant Diagnostics:  None    Assessment/Plan:     * Oral bleeding  71 y.o M known to ENT with 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 Previously admitted 5/15-5/16. Now presenting again with hemoptysis. S/p IR angiogram with no active extravasation noted. Patient with persistently elevated PT. On Eliquis but held since admission.     -- Awaiting heme lab results (fibrinogen, mixing studies PT/aPTT)  -- Full liquid vs puree diet  -- Okay to stepdown to floor under ENT  -- Please Secure Chat me for any questions, page ENT on-call if unresponsive or urgent  -- Dispo: Likely discharge on Monday pending normal lab studies        Beni Resendiz MD  Otorhinolaryngology-Head & Neck Surgery  [facility]

## 2022-05-21 NOTE — PROGRESS NOTES
[facility]  Critical Care - Surgery  Progress Note    Patient Name: Cyrus Batres Jr.  MRN: [id number]  Admission Date: 5/20/2022  Hospital Length of Stay: 1 days  Code Status: Full Code  Attending Provider: Jesse James MD  Primary Care Provider: Jesse James MD   Principal Problem: Oral bleeding    Subjective:     Hospital/ICU Course:  No notes on file    Interval History/Significant Events: NAEON. VSS. Patient went with IR yesterday, no bleed appreciated. No signs of bleeding overnight. H/H 9.2 > 8.6.  Stable for stepdown today.         Objective:     Vital Signs (Most Recent):  Temp: 98.3 °F (36.8 °C) (05/21/22 0330)  Pulse: 63 (05/21/22 0515)  Resp: 13 (05/21/22 0515)  BP: 120/66 (05/21/22 0515)  SpO2: 99 % (05/21/22 0515) Vital Signs (24h Range):  Temp:  [97 °F (36.1 °C)-98.3 °F (36.8 °C)] 98.3 °F (36.8 °C)  Pulse:  [] 63  Resp:  [10-29] 13  SpO2:  [96 %-100 %] 99 %  BP: (107-149)/(55-75) 120/66     Weight: 65.5 kg (144 lb 6.4 oz)  Body mass index is 23.31 kg/m².      Intake/Output Summary (Last 24 hours) at 5/21/2022 0647  Last data filed at 5/21/2022 0320  Gross per 24 hour   Intake 100 ml   Output 750 ml   Net -650 ml       Physical Exam  Vitals reviewed.   Constitutional:       General: He is not in acute distress.  HENT:      Head: Normocephalic and atraumatic.   Eyes:      General: No scleral icterus.     Extraocular Movements: Extraocular movements intact.   Neck:      Comments: Stoma CDI   Cardiovascular:      Rate and Rhythm: Normal rate.      Heart sounds: Normal heart sounds.   Pulmonary:      Effort: Pulmonary effort is normal.      Breath sounds: Normal breath sounds.   Abdominal:      General: There is no distension.      Palpations: Abdomen is soft. There is no mass.      Tenderness: There is no abdominal tenderness.      Hernia: No hernia is present.   Musculoskeletal:         General: Normal range of motion.      Cervical back: Normal range of motion  and neck supple.      Right lower leg: No edema.      Left lower leg: No edema.   Skin:     General: Skin is warm and dry.      Capillary Refill: Capillary refill takes less than 2 seconds.   Neurological:      General: No focal deficit present.      Mental Status: He is alert and oriented to person, place, and time.   Psychiatric:         Mood and Affect: Mood normal.         Behavior: Behavior normal.       Vents:       Lines/Drains/Airways       Drain  Duration                  Gastrostomy/Enterostomy 12/27/21 1152 Percutaneous endoscopic gastrostomy (PEG)  days              Airway  Duration                  Laryngectomy (Do Not Remove) -- days         Airway - Non-Surgical 04/27/22 1410 23 days              Peripheral Intravenous Line  Duration                  Peripheral IV - Single Lumen 05/19/22 2350 20 G Right;Posterior Forearm 1 day         Peripheral IV - Single Lumen 05/20/22 1217 20 G Anterior;Left Hand <1 day                    Significant Labs:    CBC/Anemia Profile:  Recent Labs   Lab 05/19/22 2350 05/20/22  1113 05/21/22  0344   WBC 7.41 5.79 6.79   HGB 9.1* 9.2* 8.6*   HCT 28.5* 29.3* 27.3*    262 259   MCV 82 83 83   RDW 15.0* 14.7* 14.6*        Chemistries:  Recent Labs   Lab 05/19/22 2350 05/20/22  1113 05/21/22  0344    140 140   K 4.3 4.0 4.5    109 108   CO2 21* 22* 22*   BUN 30* 26* 25*   CREATININE 1.2 0.9 0.9   CALCIUM 8.9 9.0 8.8   ALBUMIN 4.0 4.0 3.7   PROT 7.1 6.8 6.2   BILITOT 1.2* 1.2* 0.9   ALKPHOS 76 98 86   ALT 11 8* 10   AST 14 14 12   MG  --  2.0  --    PHOS  --  2.8  --        All pertinent labs within the past 24 hours have been reviewed.    Significant Imaging:  I have reviewed all pertinent imaging results/findings within the past 24 hours.    Assessment/Plan:     * Oral bleeding    Neuro/Psych:   -- Sedation: None  -- Pain: Tylenol 650mg PRN             Cards:   -- HDS  -- Hx afib, on apixaban and aspirin at home, holding in setting of acute  bleed  -- Hx HTN, on losartan 100mg at home, holding in acute setting and restart when appropriate       Pulm:   -- Goal O2 sat > 90%  -- Wean as able      Renal:  -- BUN/Cr stable   -- No mahan      FEN / GI:   -- Net -300  -- Hx GERD, esomeprazole 20mg home med.               -Pantoprazole 40  -- Replace lytes as needed  -- Nutrition: NPO in setting of oropharyngeal bleed      ID:   -- Tm: afebrile; WBC 5.79  -- Abx Not indicated      Heme/Onc:   -- H/H stable 9.2/29.3  -- Daily CBC  -- PT elevated, likely in setting of eliquis use at home. Heme curbsided by ENT       Endo:   -- Hx hypothyroid: home synthroid 100mcg  -- Hx DM2  -- Gluc goal 140-180      PPx:   Feeding: dysphagia diet   Analgesia/Sedation: PRN Tylenol / None indicated   Thromboembolic prevention: held in setting of bleed  HOB >30: Yes  Stress Ulcer ppx: PPI  Glucose control: Critical care goal 140-180 g/dl     Lines/Drains/Airway: 2 PIV, PEG tube, laryngectomy      Dispo/Code Status/Palliative:   -- SICU (likely stepdown) / Full Code       Critical care was time spent personally by me on the following activities: development of treatment plan with patient or surrogate and bedside caregivers, discussions with consultants, evaluation of patient's response to treatment, examination of patient, ordering and performing treatments and interventions, ordering and review of laboratory studies, ordering and review of radiographic studies, pulse oximetry, re-evaluation of patient's condition.  This critical care time did not overlap with that of any other provider or involve time for any procedures.     Oscar Garrett MD  Critical Care - Surgery  [facility]

## 2022-05-22 LAB
ALBUMIN SERPL BCP-MCNC: 3.6 G/DL (ref 3.5–5.2)
ALP SERPL-CCNC: 85 U/L (ref 55–135)
ALT SERPL W/O P-5'-P-CCNC: 8 U/L (ref 10–44)
ANION GAP SERPL CALC-SCNC: 10 MMOL/L (ref 8–16)
AST SERPL-CCNC: 12 U/L (ref 10–40)
BASOPHILS # BLD AUTO: 0.03 K/UL (ref 0–0.2)
BASOPHILS NFR BLD: 0.5 % (ref 0–1.9)
BILIRUB SERPL-MCNC: 1 MG/DL (ref 0.1–1)
BUN SERPL-MCNC: 19 MG/DL (ref 8–23)
CALCIUM SERPL-MCNC: 9.1 MG/DL (ref 8.7–10.5)
CHLORIDE SERPL-SCNC: 107 MMOL/L (ref 95–110)
CO2 SERPL-SCNC: 21 MMOL/L (ref 23–29)
CREAT SERPL-MCNC: 1 MG/DL (ref 0.5–1.4)
DIFFERENTIAL METHOD: ABNORMAL
EOSINOPHIL # BLD AUTO: 0.1 K/UL (ref 0–0.5)
EOSINOPHIL NFR BLD: 2.2 % (ref 0–8)
ERYTHROCYTE [DISTWIDTH] IN BLOOD BY AUTOMATED COUNT: 14.6 % (ref 11.5–14.5)
EST. GFR  (AFRICAN AMERICAN): >60 ML/MIN/1.73 M^2
EST. GFR  (NON AFRICAN AMERICAN): >60 ML/MIN/1.73 M^2
GLUCOSE SERPL-MCNC: 135 MG/DL (ref 70–110)
HCT VFR BLD AUTO: 27.1 % (ref 40–54)
HGB BLD-MCNC: 8.7 G/DL (ref 14–18)
IMM GRANULOCYTES # BLD AUTO: 0.03 K/UL (ref 0–0.04)
IMM GRANULOCYTES NFR BLD AUTO: 0.5 % (ref 0–0.5)
LYMPHOCYTES # BLD AUTO: 0.8 K/UL (ref 1–4.8)
LYMPHOCYTES NFR BLD: 12 % (ref 18–48)
MCH RBC QN AUTO: 25.7 PG (ref 27–31)
MCHC RBC AUTO-ENTMCNC: 32.1 G/DL (ref 32–36)
MCV RBC AUTO: 80 FL (ref 82–98)
MONOCYTES # BLD AUTO: 0.4 K/UL (ref 0.3–1)
MONOCYTES NFR BLD: 6.6 % (ref 4–15)
NEUTROPHILS # BLD AUTO: 5 K/UL (ref 1.8–7.7)
NEUTROPHILS NFR BLD: 78.2 % (ref 38–73)
NRBC BLD-RTO: 0 /100 WBC
PLATELET # BLD AUTO: 261 K/UL (ref 150–450)
PMV BLD AUTO: 11.3 FL (ref 9.2–12.9)
POCT GLUCOSE: 135 MG/DL (ref 70–110)
POCT GLUCOSE: 137 MG/DL (ref 70–110)
POCT GLUCOSE: 139 MG/DL (ref 70–110)
POCT GLUCOSE: 181 MG/DL (ref 70–110)
POTASSIUM SERPL-SCNC: 4.3 MMOL/L (ref 3.5–5.1)
PROT SERPL-MCNC: 6.1 G/DL (ref 6–8.4)
RBC # BLD AUTO: 3.38 M/UL (ref 4.6–6.2)
SODIUM SERPL-SCNC: 138 MMOL/L (ref 136–145)
WBC # BLD AUTO: 6.35 K/UL (ref 3.9–12.7)

## 2022-05-22 PROCEDURE — G0378 HOSPITAL OBSERVATION PER HR: HCPCS

## 2022-05-22 PROCEDURE — 25000003 PHARM REV CODE 250: Performed by: STUDENT IN AN ORGANIZED HEALTH CARE EDUCATION/TRAINING PROGRAM

## 2022-05-22 PROCEDURE — 80053 COMPREHEN METABOLIC PANEL: CPT | Performed by: STUDENT IN AN ORGANIZED HEALTH CARE EDUCATION/TRAINING PROGRAM

## 2022-05-22 PROCEDURE — 94761 N-INVAS EAR/PLS OXIMETRY MLT: CPT

## 2022-05-22 PROCEDURE — 85025 COMPLETE CBC W/AUTO DIFF WBC: CPT | Performed by: STUDENT IN AN ORGANIZED HEALTH CARE EDUCATION/TRAINING PROGRAM

## 2022-05-22 PROCEDURE — 99900035 HC TECH TIME PER 15 MIN (STAT)

## 2022-05-22 RX ADMIN — Medication 6 MG: at 09:05

## 2022-05-22 RX ADMIN — LEVOTHYROXINE SODIUM 100 MCG: 100 TABLET ORAL at 06:05

## 2022-05-22 RX ADMIN — PANTOPRAZOLE SODIUM 40 MG: 40 TABLET, DELAYED RELEASE ORAL at 08:05

## 2022-05-22 NOTE — SUBJECTIVE & OBJECTIVE
Interval History: NAEON. No further bleeding from mouth. Reporting some difficulty with swallowing and regurgitation with liquids and pureed food. MBSS from two weeks ago shows mild esophageal stenosis at C4-5.     Medications:  Continuous Infusions:  Scheduled Meds:   levothyroxine  100 mcg Oral Before breakfast    pantoprazole  40 mg Oral Daily     PRN Meds:acetaminophen, calcium gluconate IVPB, calcium gluconate IVPB, calcium gluconate IVPB, dextrose 10%, dextrose 10%, glucagon (human recombinant), glucose, glucose, magnesium sulfate IVPB, magnesium sulfate IVPB, melatonin, ondansetron, potassium chloride **AND** potassium chloride **AND** potassium chloride, prochlorperazine, senna-docusate 8.6-50 mg, sodium chloride 0.9%, sodium chloride 0.9%     Review of patient's allergies indicates:   Allergen Reactions    Pollen extracts     Lovastatin Rash     Not confirmed but pt skeptical     Objective:     Vital Signs (24h Range):  Temp:  [98.2 °F (36.8 °C)-98.6 °F (37 °C)] 98.4 °F (36.9 °C)  Pulse:  [69-92] 80  Resp:  [13-38] 20  SpO2:  [94 %-100 %] 100 %  BP: (116-146)/(60-77) 126/75       Lines/Drains/Airways       Drain  Duration                  Gastrostomy/Enterostomy 12/27/21 1152 Percutaneous endoscopic gastrostomy (PEG)  days              Airway  Duration                  Laryngectomy (Do Not Remove) -- days         Airway - Non-Surgical 04/27/22 1410 24 days              Peripheral Intravenous Line  Duration                  Peripheral IV - Single Lumen 05/19/22 2350 20 G Right;Posterior Forearm 2 days         Peripheral IV - Single Lumen 05/20/22 1217 20 G Anterior;Left Hand 1 day                  Physical Exam  Resting comfortably on bed  Oral cavity with moist mucous membrane, no blood clots  Laryngeal stoma intact, no blood    Significant Labs:  BMP:   Recent Labs   Lab 05/20/22  1113 05/21/22  0344 05/22/22  0300   *   < > 135*      < > 107   CO2 22*   < > 21*   BUN 26*   < > 19    CREATININE 0.9   < > 1.0   CALCIUM 9.0   < > 9.1   MG 2.0  --   --     < > = values in this interval not displayed.     CBC:   Recent Labs   Lab 05/22/22  0300   WBC 6.35   RBC 3.38*   HGB 8.7*   HCT 27.1*      MCV 80*   MCH 25.7*   MCHC 32.1     Significant Diagnostics:  None

## 2022-05-22 NOTE — NURSING
Patient has an order for telemetry and cont. Pulse Ox.  Nurse entered the room and patient refused telemetry and pulse ox.  A call placed to Dr. Beni Resendiz and awaiting return of page for further recommendations.  Patient is lysing in bed and in no acute distress.      @1506  Orders received to dc telemetry and cont. Pulse ox.     @1509 Spoke to telemetry tech. Patient updated and verbalized an understanding.

## 2022-05-22 NOTE — PLAN OF CARE
"SICU PLAN OF CARE NOTE     Dx: Oral bleeding     Goals of Care:  MAP >65     Vital Signs:  BP (!) 110/56   Pulse 69   Temp 98.3 °F (36.8 °C) (Oral)   Resp 14   Ht 5' 6" (1.676 m)   Wt 63.5 kg (140 lb)   SpO2 99%   BMI 22.60 kg/m²      Cardiac:  NSR     Resp:  Room Air      Neuro:  AAO x4, Follows Commands, and Moves All Extremities     Urine Output:  Voids Spontaneously 800 cc/shift     Diet:  Pureed     Labs/Accuchecks:  Labs Reviewed     Skin:  All skin remains free from new injury.  Patient turned Q2, waffle mattress inflated, ICU bed working correctly.     Shift Events:  Patient alert and orient X4. No signs or symptoms of bleeding noted. No spitting up secretions at this time. Mouths needs as well as use communication pad/writing. Complains of no pain or discomfort. Moves self in bed. Complains of difficulty swallowing pudding. Swallows clear liquid well. MD made aware. Continuing to monitor and aspiration precautions in place. Up to chair this AM. Tolerated transfer well with standby assist only. Reports being better able to swallow this AM. See flowsheet for further assessment/details.  Patient updated on current condition/plan of care, questions answered, and emotional support provided. MD updated on current condition, vitals, labs, and gtts.  No new orders received.   "

## 2022-05-22 NOTE — NURSING TRANSFER
Nursing Transfer Note      5/22/2022     Reason patient is being transferred: stepdown  Transfer from : [id number] to [id number]      Transfer via wheelchair    Transfer with chart    Transported by JAMIR Ferrara    Medicines sent: NA    Any special needs or follow-up needed: laryngectomy    Chart send with patient: Yes    Notified: NA    Patient reassessed at:  5/22/2022 0900      Upon arrival to floor: patient oriented to room, call bell in reach and bed in lowest position    Report given to Jamir Jackson.

## 2022-05-22 NOTE — SUBJECTIVE & OBJECTIVE
Interval History/Significant Events: Mr. Batres endorses regurgitation with thick liquids (ie pudding), but none to thin liquids. He also endorses difficulty with sleep overnight. He remains in the ICU awaiting a bed on the floor. VSS. Afebrile.         Objective:     Vital Signs (Most Recent):  Temp: 98.2 °F (36.8 °C) (05/22/22 0302)  Pulse: 73 (05/22/22 0530)  Resp: 14 (05/22/22 0530)  BP: 117/70 (05/22/22 0530)  SpO2: 99 % (05/22/22 0530) Vital Signs (24h Range):  Temp:  [98.2 °F (36.8 °C)-98.6 °F (37 °C)] 98.2 °F (36.8 °C)  Pulse:  [68-92] 73  Resp:  [12-27] 14  SpO2:  [94 %-100 %] 99 %  BP: (105-146)/(55-77) 117/70     Weight: 65.5 kg (144 lb 6.4 oz)  Body mass index is 23.31 kg/m².      Intake/Output Summary (Last 24 hours) at 5/22/2022 0550  Last data filed at 5/22/2022 0500  Gross per 24 hour   Intake 1594.38 ml   Output 725 ml   Net 869.38 ml       Physical Exam  Vitals reviewed.   Constitutional:       General: He is not in acute distress.  HENT:      Head: Normocephalic and atraumatic.   Eyes:      General: No scleral icterus.     Extraocular Movements: Extraocular movements intact.   Neck:      Comments: Stoma CDI   Cardiovascular:      Rate and Rhythm: Normal rate.      Heart sounds: Normal heart sounds.   Pulmonary:      Effort: Pulmonary effort is normal.      Breath sounds: Normal breath sounds.   Abdominal:      General: There is no distension.      Palpations: Abdomen is soft. There is no mass.      Tenderness: There is no abdominal tenderness.      Hernia: No hernia is present.   Musculoskeletal:         General: Normal range of motion.      Cervical back: Normal range of motion and neck supple.      Right lower leg: No edema.      Left lower leg: No edema.   Skin:     General: Skin is warm and dry.      Capillary Refill: Capillary refill takes less than 2 seconds.   Neurological:      General: No focal deficit present.      Mental Status: He is alert and oriented to person, place, and time.    Psychiatric:         Mood and Affect: Mood normal.         Behavior: Behavior normal.       Vents:       Lines/Drains/Airways       Drain  Duration                  Gastrostomy/Enterostomy 12/27/21 1152 Percutaneous endoscopic gastrostomy (PEG)  days              Airway  Duration                  Laryngectomy (Do Not Remove) -- days         Airway - Non-Surgical 04/27/22 1410 24 days              Peripheral Intravenous Line  Duration                  Peripheral IV - Single Lumen 05/19/22 2350 20 G Right;Posterior Forearm 2 days         Peripheral IV - Single Lumen 05/20/22 1217 20 G Anterior;Left Hand 1 day                    Significant Labs:    CBC/Anemia Profile:  Recent Labs   Lab 05/20/22  1113 05/21/22  0344 05/22/22  0300   WBC 5.79 6.79 6.35   HGB 9.2* 8.6* 8.7*   HCT 29.3* 27.3* 27.1*    259 261   MCV 83 83 80*   RDW 14.7* 14.6* 14.6*        Chemistries:  Recent Labs   Lab 05/20/22  1113 05/21/22  0344 05/22/22  0300    140 138   K 4.0 4.5 4.3    108 107   CO2 22* 22* 21*   BUN 26* 25* 19   CREATININE 0.9 0.9 1.0   CALCIUM 9.0 8.8 9.1   ALBUMIN 4.0 3.7 3.6   PROT 6.8 6.2 6.1   BILITOT 1.2* 0.9 1.0   ALKPHOS 98 86 85   ALT 8* 10 8*   AST 14 12 12   MG 2.0  --   --    PHOS 2.8  --   --        All pertinent labs within the past 24 hours have been reviewed.    Significant Imaging:  I have reviewed all pertinent imaging results/findings within the past 24 hours.

## 2022-05-22 NOTE — ASSESSMENT & PLAN NOTE
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 Previously admitted 5/15-5/16. Now presenting again with hemoptysis. S/p IR angiogram with no active extravasation noted. Patient with persistently elevated PT. On Eliquis but held since admission.     -- Awaiting heme lab results (mixing studies PT/aPTT)  -- Puree diet  -- SLP evaluation for possible swallowing therapies  -- Please Secure Chat me for any questions, page ENT on-call if unresponsive or urgent  -- Dispo: Stepdown today, likely discharge on Monday

## 2022-05-22 NOTE — PLAN OF CARE
Problem: Adult Inpatient Plan of Care  Goal: Plan of Care Review  Outcome: Ongoing, Progressing  Goal: Patient-Specific Goal (Individualized)  Outcome: Ongoing, Progressing  Goal: Absence of Hospital-Acquired Illness or Injury  Outcome: Ongoing, Progressing  Goal: Optimal Comfort and Wellbeing  Outcome: Ongoing, Progressing  Goal: Readiness for Transition of Care  Outcome: Ongoing, Progressing     Problem: Diabetes Comorbidity  Goal: Blood Glucose Level Within Targeted Range  Outcome: Ongoing, Progressing     Shift Note:  Patient is AAO x 4, able to mouth words and uses an AL device for communicating. PAtient denies pain and educated on present pain medication regimen of tylenol 650 po q4 prn pain.  Skin WDI, patient has a larynectomy [4/24/22.  CBG  ac/hs, patient requires no coverage. PAtien is on room air with trach stoma.  Tolerating a dysphagia pureed diet with sips of water.  Ambulates OOB with contact guard assist.  Patient OB to chair.  Voids per urinal and LBM: 5/19 [pt has prn laxatives/stool softners].  Orders received to DC telemetry and cont pulse Ox.  Safety rounds per protocol : upper side rails x 2,  bed in lowest position, call light within reach. WCTM

## 2022-05-22 NOTE — PROGRESS NOTES
[facility]  Critical Care - Surgery  Progress Note    Patient Name: Cyrus Batres Jr.  MRN: [id number]  Admission Date: 5/20/2022  Hospital Length of Stay: 1 days  Code Status: Full Code  Attending Provider: Jesse James MD  Primary Care Provider: Jesse James MD   Principal Problem: Oral bleeding    Subjective:     Hospital/ICU Course:  No notes on file    Interval History/Significant Events: Mr. Batres endorses regurgitation with thick liquids (ie pudding), but none to thin liquids. He also endorses difficulty with sleep overnight. He remains in the ICU awaiting a bed on the floor. VSS. Afebrile.         Objective:     Vital Signs (Most Recent):  Temp: 98.2 °F (36.8 °C) (05/22/22 0302)  Pulse: 73 (05/22/22 0530)  Resp: 14 (05/22/22 0530)  BP: 117/70 (05/22/22 0530)  SpO2: 99 % (05/22/22 0530) Vital Signs (24h Range):  Temp:  [98.2 °F (36.8 °C)-98.6 °F (37 °C)] 98.2 °F (36.8 °C)  Pulse:  [68-92] 73  Resp:  [12-27] 14  SpO2:  [94 %-100 %] 99 %  BP: (105-146)/(55-77) 117/70     Weight: 65.5 kg (144 lb 6.4 oz)  Body mass index is 23.31 kg/m².      Intake/Output Summary (Last 24 hours) at 5/22/2022 0550  Last data filed at 5/22/2022 0500  Gross per 24 hour   Intake 1594.38 ml   Output 725 ml   Net 869.38 ml       Physical Exam  Vitals reviewed.   Constitutional:       General: He is not in acute distress.  HENT:      Head: Normocephalic and atraumatic.   Eyes:      General: No scleral icterus.     Extraocular Movements: Extraocular movements intact.   Neck:      Comments: Stoma CDI   Cardiovascular:      Rate and Rhythm: Normal rate.      Heart sounds: Normal heart sounds.   Pulmonary:      Effort: Pulmonary effort is normal.      Breath sounds: Normal breath sounds.   Abdominal:      General: There is no distension.      Palpations: Abdomen is soft. There is no mass.      Tenderness: There is no abdominal tenderness.      Hernia: No hernia is present.   Musculoskeletal:          General: Normal range of motion.      Cervical back: Normal range of motion and neck supple.      Right lower leg: No edema.      Left lower leg: No edema.   Skin:     General: Skin is warm and dry.      Capillary Refill: Capillary refill takes less than 2 seconds.   Neurological:      General: No focal deficit present.      Mental Status: He is alert and oriented to person, place, and time.   Psychiatric:         Mood and Affect: Mood normal.         Behavior: Behavior normal.       Vents:       Lines/Drains/Airways       Drain  Duration                  Gastrostomy/Enterostomy 12/27/21 1152 Percutaneous endoscopic gastrostomy (PEG)  days              Airway  Duration                  Laryngectomy (Do Not Remove) -- days         Airway - Non-Surgical 04/27/22 1410 24 days              Peripheral Intravenous Line  Duration                  Peripheral IV - Single Lumen 05/19/22 2350 20 G Right;Posterior Forearm 2 days         Peripheral IV - Single Lumen 05/20/22 1217 20 G Anterior;Left Hand 1 day                    Significant Labs:    CBC/Anemia Profile:  Recent Labs   Lab 05/20/22  1113 05/21/22  0344 05/22/22  0300   WBC 5.79 6.79 6.35   HGB 9.2* 8.6* 8.7*   HCT 29.3* 27.3* 27.1*    259 261   MCV 83 83 80*   RDW 14.7* 14.6* 14.6*        Chemistries:  Recent Labs   Lab 05/20/22  1113 05/21/22  0344 05/22/22  0300    140 138   K 4.0 4.5 4.3    108 107   CO2 22* 22* 21*   BUN 26* 25* 19   CREATININE 0.9 0.9 1.0   CALCIUM 9.0 8.8 9.1   ALBUMIN 4.0 3.7 3.6   PROT 6.8 6.2 6.1   BILITOT 1.2* 0.9 1.0   ALKPHOS 98 86 85   ALT 8* 10 8*   AST 14 12 12   MG 2.0  --   --    PHOS 2.8  --   --        All pertinent labs within the past 24 hours have been reviewed.    Significant Imaging:  I have reviewed all pertinent imaging results/findings within the past 24 hours.    Assessment/Plan:     * Oral bleeding    Neuro/Psych:   -- Sedation: None  -- Pain: Tylenol 650mg PRN             Cards:   -- HDS  --  Hx afib, on apixaban and aspirin at home, holding in setting of acute bleed  -- Hx HTN, on losartan 100mg at home, holding in acute setting and restart when appropriate       Pulm:   -- Goal O2 sat > 90%  -- Wean as able      Renal:  -- BUN/Cr stable   -- No mhaan      FEN / GI:   -- Net -300  -- Hx GERD, esomeprazole 20mg home med.               -Pantoprazole 40  -- Replace lytes as needed  -- Nutrition: NPO in setting of oropharyngeal bleed      ID:   -- Tm: afebrile; WBC 5.79  -- Abx Not indicated      Heme/Onc:   -- H/H stable 9.2/29.3  -- Daily CBC  -- PT elevated, likely in setting of eliquis use at home. Heme curbsided by ENT       Endo:   -- Hx hypothyroid: home synthroid 100mcg  -- Hx DM2  -- Gluc goal 140-180      PPx:   Feeding: dysphagia diet   Analgesia/Sedation: PRN Tylenol / None indicated   Thromboembolic prevention: held in setting of bleed  HOB >30: Yes  Stress Ulcer ppx: PPI  Glucose control: Critical care goal 140-180 g/dl     Lines/Drains/Airway: 2 PIV, PEG tube, laryngectomy      Dispo/Code Status/Palliative:   -- SD to floor / Full Code      Critical secondary to Patient has a condition that poses threat to life and bodily function: Airway watch     Critical care was time spent personally by me on the following activities: development of treatment plan with patient or surrogate and bedside caregivers, discussions with consultants, evaluation of patient's response to treatment, examination of patient, ordering and performing treatments and interventions, ordering and review of laboratory studies, ordering and review of radiographic studies, pulse oximetry, re-evaluation of patient's condition.  This critical care time did not overlap with that of any other provider or involve time for any procedures.     Javon Moyer MD  Critical Care - Surgery  [facility]

## 2022-05-22 NOTE — NURSING
Atient arrived to room via W/c accompanied by nurse form SICU. PAtient is AAO x 4, and able to mouth words.  Skin WDI, trach stoma healing and monitoring for bleeding. Labs: hgb 9.1. Able to void using urinal and  requires contact guard assist.  Patient has R FA 20g and L hand 20 g [flushes and saline locked].  Requested telebox with continous O2 monitoring from telemetry tech.  patient is resting and well and in no acute  distress.  PAtient oriented to room, call light, personal items W/N reach and bed in lowest position.  Patient asked and provided TV guide. POC discussed with patient and verbalized an agreement.

## 2022-05-22 NOTE — NURSING
Patient arrived to unit and did have orders for telemetry and continous pulse ox.  Telemetry staff informed via phone and verified that patient  Had an order. Telemetry tech will send a tele box with cont pulse ox, when one is available.

## 2022-05-22 NOTE — PROGRESS NOTES
[facility]  Otorhinolaryngology-Head & Neck Surgery  Progress Note    Subjective:     Post-Op Info:  * No surgery found *      Hospital Day: 3     Interval History: NAEON. No further bleeding from mouth. Reporting some difficulty with swallowing and regurgitation with liquids and pureed food. MBSS from two weeks ago shows mild esophageal stenosis at C4-5.     Medications:  Continuous Infusions:  Scheduled Meds:   levothyroxine  100 mcg Oral Before breakfast    pantoprazole  40 mg Oral Daily     PRN Meds:acetaminophen, calcium gluconate IVPB, calcium gluconate IVPB, calcium gluconate IVPB, dextrose 10%, dextrose 10%, glucagon (human recombinant), glucose, glucose, magnesium sulfate IVPB, magnesium sulfate IVPB, melatonin, ondansetron, potassium chloride **AND** potassium chloride **AND** potassium chloride, prochlorperazine, senna-docusate 8.6-50 mg, sodium chloride 0.9%, sodium chloride 0.9%     Review of patient's allergies indicates:   Allergen Reactions    Pollen extracts     Lovastatin Rash     Not confirmed but pt skeptical     Objective:     Vital Signs (24h Range):  Temp:  [98.2 °F (36.8 °C)-98.6 °F (37 °C)] 98.4 °F (36.9 °C)  Pulse:  [69-92] 80  Resp:  [13-38] 20  SpO2:  [94 %-100 %] 100 %  BP: (116-146)/(60-77) 126/75       Lines/Drains/Airways       Drain  Duration                  Gastrostomy/Enterostomy 12/27/21 1152 Percutaneous endoscopic gastrostomy (PEG)  days              Airway  Duration                  Laryngectomy (Do Not Remove) -- days         Airway - Non-Surgical 04/27/22 1410 24 days              Peripheral Intravenous Line  Duration                  Peripheral IV - Single Lumen 05/19/22 2350 20 G Right;Posterior Forearm 2 days         Peripheral IV - Single Lumen 05/20/22 1217 20 G Anterior;Left Hand 1 day                  Physical Exam  Resting comfortably on bed  Oral cavity with moist mucous membrane, no blood clots  Laryngeal stoma intact, no  blood    Significant Labs:  BMP:   Recent Labs   Lab 05/20/22  1113 05/21/22  0344 05/22/22  0300   *   < > 135*      < > 107   CO2 22*   < > 21*   BUN 26*   < > 19   CREATININE 0.9   < > 1.0   CALCIUM 9.0   < > 9.1   MG 2.0  --   --     < > = values in this interval not displayed.     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 Previously admitted 5/15-5/16. Now presenting again with hemoptysis. S/p IR angiogram with no active extravasation noted. Patient with persistently elevated PT. On Eliquis but held since admission.     -- Awaiting heme lab results (mixing studies PT/aPTT)  -- Puree diet  -- SLP evaluation for possible swallowing therapies  -- Please Secure Chat me for any questions, page ENT on-call if unresponsive or urgent  -- Dispo: Stepdown today, likely discharge on Monday        Beni Resendiz MD  Otorhinolaryngology-Head & Neck Surgery  [facility]

## 2022-05-22 NOTE — RESPIRATORY THERAPY
[facility] RESPIRATORY THERAPY PROACTIVE NOTE           Time of visit: 1136     Code Status: Full Code   : 1950  Bed: 1001/1001 A:   MRN: [id number]  Time spent at the bedside: < 15 min    SITUATION    Evaluated patient for: LDA Check     BACKGROUND    Patient has a past medical history of Allergy, Atrial fibrillation, Chronic anticoagulation, Diabetes mellitus, type 2, Hypertension, and Larynx neoplasm malignant.  Clinically Significant Surgical Hx: laryngectomy    24 Hours Vitals Range:  Temp:  [98.1 °F (36.7 °C)-98.6 °F (37 °C)]   Pulse:  []   Resp:  [13-38]   BP: (117-146)/(62-77)   SpO2:  [94 %-100 %]     Labs:    Recent Labs     22  1113 22  0344 22  0300    140 138   K 4.0 4.5 4.3    108 107   CO2 22* 22* 21*   CREATININE 0.9 0.9 1.0   * 108 135*   PHOS 2.8  --   --    MG 2.0  --   --         No results for input(s): PH, PCO2, PO2, HCO3, POCSATURATED, BE in the last 72 hours.    ASSESSMENT/INTERVENTIONS    Upon arrival in room Pt resting comfortably with no respiratory needs.    Last VS   Temp: 98.1 °F (36.7 °C) ( 1037)  Pulse: 77 ( 1200)  Resp: 18 ( 1200)  BP: 130/68 ( 1037)  SpO2: 99 % ( 1200)    Level of Consciousness: Level of Consciousness (AVPU): alert  Respiratory Effort: Respiratory Effort: Normal, Unlabored Expansion/Accessory Muscle Usage: Expansion/Accessory Muscles/Retractions: expansion symmetric, no retractions, no use of accessory muscles  All Lung Field Breath Sounds: All Lung Fields Breath Sounds: Anterior:, Lateral:, clear  O2 Device/Concentration:  ,  , O2 Device (Oxygen Therapy): room air  Surgical airway: laryngectomy, Cheli tube  Ambu at bedside: Ambu bag with the patient?: Yes, Adult Ambu     Active Orders   Respiratory Care    Pulse Oximetry Continuous     Frequency: Continuous     Number of Occurrences: Until Specified    Stoma Care by RT Q4H     Frequency: Q4H     Number of Occurrences: Until Specified        RECOMMENDATIONS    We recommend: RRT Recs: Continue POC per primary team.    ESCALATION        FOLLOW-UP    Please call back the Rapid Response RT, Antonio Roberts, RRT at x [phone] for any questions or concerns.

## 2022-05-23 ENCOUNTER — PATIENT MESSAGE (OUTPATIENT)
Dept: CARDIOLOGY | Facility: CLINIC | Age: 72
End: 2022-05-23
Payer: MEDICARE

## 2022-05-23 VITALS
HEART RATE: 74 BPM | TEMPERATURE: 98 F | RESPIRATION RATE: 12 BRPM | BODY MASS INDEX: 22.04 KG/M2 | DIASTOLIC BLOOD PRESSURE: 72 MMHG | HEIGHT: 66 IN | WEIGHT: 137.13 LBS | OXYGEN SATURATION: 100 % | SYSTOLIC BLOOD PRESSURE: 135 MMHG

## 2022-05-23 LAB
ALBUMIN SERPL BCP-MCNC: 3.4 G/DL (ref 3.5–5.2)
ALP SERPL-CCNC: 84 U/L (ref 55–135)
ALT SERPL W/O P-5'-P-CCNC: 6 U/L (ref 10–44)
ANION GAP SERPL CALC-SCNC: 9 MMOL/L (ref 8–16)
AST SERPL-CCNC: 10 U/L (ref 10–40)
BASOPHILS # BLD AUTO: 0.02 K/UL (ref 0–0.2)
BASOPHILS NFR BLD: 0.3 % (ref 0–1.9)
BILIRUB SERPL-MCNC: 0.7 MG/DL (ref 0.1–1)
BUN SERPL-MCNC: 17 MG/DL (ref 8–23)
CALCIUM SERPL-MCNC: 9.1 MG/DL (ref 8.7–10.5)
CHLORIDE SERPL-SCNC: 108 MMOL/L (ref 95–110)
CO2 SERPL-SCNC: 22 MMOL/L (ref 23–29)
CREAT SERPL-MCNC: 0.9 MG/DL (ref 0.5–1.4)
DIFFERENTIAL METHOD: ABNORMAL
EOSINOPHIL # BLD AUTO: 0.2 K/UL (ref 0–0.5)
EOSINOPHIL NFR BLD: 3.4 % (ref 0–8)
ERYTHROCYTE [DISTWIDTH] IN BLOOD BY AUTOMATED COUNT: 14.8 % (ref 11.5–14.5)
EST. GFR  (AFRICAN AMERICAN): >60 ML/MIN/1.73 M^2
EST. GFR  (NON AFRICAN AMERICAN): >60 ML/MIN/1.73 M^2
GLUCOSE SERPL-MCNC: 108 MG/DL (ref 70–110)
HCT VFR BLD AUTO: 25.4 % (ref 40–54)
HGB BLD-MCNC: 8.2 G/DL (ref 14–18)
IMM GRANULOCYTES # BLD AUTO: 0.03 K/UL (ref 0–0.04)
IMM GRANULOCYTES NFR BLD AUTO: 0.5 % (ref 0–0.5)
INR PPP: 1.1 (ref 0.8–1.2)
LYMPHOCYTES # BLD AUTO: 0.8 K/UL (ref 1–4.8)
LYMPHOCYTES NFR BLD: 12.7 % (ref 18–48)
MCH RBC QN AUTO: 25.4 PG (ref 27–31)
MCHC RBC AUTO-ENTMCNC: 32.3 G/DL (ref 32–36)
MCV RBC AUTO: 79 FL (ref 82–98)
MONOCYTES # BLD AUTO: 0.4 K/UL (ref 0.3–1)
MONOCYTES NFR BLD: 6.6 % (ref 4–15)
NEUTROPHILS # BLD AUTO: 4.5 K/UL (ref 1.8–7.7)
NEUTROPHILS NFR BLD: 76.5 % (ref 38–73)
NRBC BLD-RTO: 0 /100 WBC
PLATELET # BLD AUTO: 273 K/UL (ref 150–450)
PMV BLD AUTO: 10.9 FL (ref 9.2–12.9)
POCT GLUCOSE: 107 MG/DL (ref 70–110)
POCT GLUCOSE: 138 MG/DL (ref 70–110)
POTASSIUM SERPL-SCNC: 4 MMOL/L (ref 3.5–5.1)
PROT SERPL-MCNC: 6.2 G/DL (ref 6–8.4)
PROTHROMBIN TIME: 11.5 SEC (ref 9–12.5)
RBC # BLD AUTO: 3.23 M/UL (ref 4.6–6.2)
SODIUM SERPL-SCNC: 139 MMOL/L (ref 136–145)
WBC # BLD AUTO: 5.91 K/UL (ref 3.9–12.7)

## 2022-05-23 PROCEDURE — G0378 HOSPITAL OBSERVATION PER HR: HCPCS

## 2022-05-23 PROCEDURE — 97535 SELF CARE MNGMENT TRAINING: CPT

## 2022-05-23 PROCEDURE — 36415 COLL VENOUS BLD VENIPUNCTURE: CPT | Performed by: STUDENT IN AN ORGANIZED HEALTH CARE EDUCATION/TRAINING PROGRAM

## 2022-05-23 PROCEDURE — 99900035 HC TECH TIME PER 15 MIN (STAT)

## 2022-05-23 PROCEDURE — 92610 EVALUATE SWALLOWING FUNCTION: CPT

## 2022-05-23 PROCEDURE — 94761 N-INVAS EAR/PLS OXIMETRY MLT: CPT

## 2022-05-23 PROCEDURE — 80053 COMPREHEN METABOLIC PANEL: CPT | Performed by: STUDENT IN AN ORGANIZED HEALTH CARE EDUCATION/TRAINING PROGRAM

## 2022-05-23 PROCEDURE — 25000003 PHARM REV CODE 250: Performed by: STUDENT IN AN ORGANIZED HEALTH CARE EDUCATION/TRAINING PROGRAM

## 2022-05-23 PROCEDURE — 85610 PROTHROMBIN TIME: CPT | Performed by: STUDENT IN AN ORGANIZED HEALTH CARE EDUCATION/TRAINING PROGRAM

## 2022-05-23 PROCEDURE — 51798 US URINE CAPACITY MEASURE: CPT

## 2022-05-23 PROCEDURE — 85025 COMPLETE CBC W/AUTO DIFF WBC: CPT | Performed by: STUDENT IN AN ORGANIZED HEALTH CARE EDUCATION/TRAINING PROGRAM

## 2022-05-23 RX ADMIN — ACETAMINOPHEN 650 MG: 325 TABLET ORAL at 08:05

## 2022-05-23 RX ADMIN — LEVOTHYROXINE SODIUM 100 MCG: 100 TABLET ORAL at 06:05

## 2022-05-23 RX ADMIN — PANTOPRAZOLE SODIUM 40 MG: 40 TABLET, DELAYED RELEASE ORAL at 08:05

## 2022-05-23 NOTE — HOSPITAL COURSE
Patient admitted on Friday after experiencing another bleeding from mouth. Eliquis held. Hematology consulted and recommended lab workup. PT initially elevated by back to baseline on 5/20. No evidence of DIC. Likely Eliquis as cause of elevated PT. No further bleeding over the weekend. Patient to continue holding Eliquis. Patient will discuss with cardiologist the risk and benefit of continuing Eliquis given two episodes of bleeding in the past week. Patient discharged on Monday in good condition.

## 2022-05-23 NOTE — NURSING
Pt given discharge information. Pt educated on discharge information, and medications. Pt has no further question at this time. Awaiting pt's transportation, pt states his wife will pick him up around 1600.   1654: PIV removed.

## 2022-05-23 NOTE — RESPIRATORY THERAPY
RAPID RESPONSE RESPIRATORY THERAPY PROACTIVE NOTE           Time of visit: 0756     Code Status: Full Code   : 1950  Bed: 1001/1001 A:   MRN: [id number]  Time spent at the bedside: < 15 min    SITUATION    Evaluated patient for: LDA Check     BACKGROUND    Patient has a past medical history of Allergy, Atrial fibrillation, Chronic anticoagulation, Diabetes mellitus, type 2, Hypertension, and Larynx neoplasm malignant.  Clinically Significant Surgical Hx: laryngectomy    24 Hours Vitals Range:  Temp:  [97 °F (36.1 °C)-98.3 °F (36.8 °C)]   Pulse:  []   Resp:  [12-34]   BP: (109-137)/(58-77)   SpO2:  [99 %-100 %]     Labs:    Recent Labs     22  1113 22  0344 22  0300 22  0223    140 138 139   K 4.0 4.5 4.3 4.0    108 107 108   CO2 22* 22* 21* 22*   CREATININE 0.9 0.9 1.0 0.9   * 108 135* 108   PHOS 2.8  --   --   --    MG 2.0  --   --   --         No results for input(s): PH, PCO2, PO2, HCO3, POCSATURATED, BE in the last 72 hours.    ASSESSMENT/INTERVENTIONS    Upon arrival in room all emergency supplies are at bedside including a 6,7,8 ETT    Last VS   Temp: 97.3 °F (36.3 °C) (728)  Pulse: 69 (728)  Resp: 18 (728)  BP: 124/67 (728)  SpO2: 100 % (728)    Level of Consciousness: Level of Consciousness (AVPU): alert  Respiratory Effort: Respiratory Effort: Unlabored Expansion/Accessory Muscle Usage: Expansion/Accessory Muscles/Retractions: no retractions, no use of accessory muscles  All Lung Field Breath Sounds: All Lung Fields Breath Sounds: clear  O2 Device/Concentration:  ,  , O2 Device (Oxygen Therapy): tracheostomy HME oxygen (on room air  )  Surgical airway: , mp  Ambu at bedside: Ambu bag with the patient?: Yes, Adult Ambu     Active Orders   Respiratory Care    Stoma Care by RT Q4H     Frequency: Q4H     Number of Occurrences: Until Specified       RECOMMENDATIONS    We recommend: RRT Recs: Continue POC per primary  team.    ESCALATION        FOLLOW-UP    Please call back the Rapid Response RT, Sanjuana Conklin, RRT at x [phone] for any questions or concerns.

## 2022-05-23 NOTE — SUBJECTIVE & OBJECTIVE
Interval History: NAEON. Awaiting heme lab results.    Medications:  Continuous Infusions:  Scheduled Meds:   levothyroxine  100 mcg Oral Before breakfast    pantoprazole  40 mg Oral Daily     PRN Meds:acetaminophen, dextrose 10%, dextrose 10%, glucagon (human recombinant), glucose, glucose, melatonin, ondansetron, prochlorperazine, senna-docusate 8.6-50 mg, sodium chloride 0.9%, sodium chloride 0.9%     Review of patient's allergies indicates:   Allergen Reactions    Pollen extracts     Lovastatin Rash     Not confirmed but pt skeptical     Objective:     Vital Signs (24h Range):  Temp:  [97 °F (36.1 °C)-98.4 °F (36.9 °C)] 97.8 °F (36.6 °C)  Pulse:  [] 70  Resp:  [12-34] 15  SpO2:  [99 %-100 %] 99 %  BP: (109-137)/(58-77) 126/69     Lines/Drains/Airways       Drain  Duration                  Gastrostomy/Enterostomy 12/27/21 1152 Percutaneous endoscopic gastrostomy (PEG)  days              Airway  Duration                  Laryngectomy (Do Not Remove) -- days         Airway - Non-Surgical 04/27/22 1410 25 days              Peripheral Intravenous Line  Duration                  Peripheral IV - Single Lumen 05/20/22 1217 20 G Anterior;Left Hand 2 days                  Physical Exam  Resting comfortably on bed  Oral cavity with moist mucous membrane, no blood clots  Laryngeal stoma intact, no blood    Significant Labs:  BMP:   Recent Labs   Lab 05/20/22  1113 05/21/22  0344 05/23/22  0223   *   < > 108      < > 108   CO2 22*   < > 22*   BUN 26*   < > 17   CREATININE 0.9   < > 0.9   CALCIUM 9.0   < > 9.1   MG 2.0  --   --     < > = values in this interval not displayed.     CBC:   Recent Labs   Lab 05/23/22  0223   WBC 5.91   RBC 3.23*   HGB 8.2*   HCT 25.4*      MCV 79*   MCH 25.4*   MCHC 32.3     Significant Diagnostics:  None

## 2022-05-23 NOTE — PLAN OF CARE
[facility]  Initial Discharge Assessment       Primary Care Provider: Jesse James MD    Admission Diagnosis: Oral bleeding [K13.79]    Admission Date: 5/20/2022  Expected Discharge Date: 5/23/2022    Discharge Barriers Identified: None    Payor: HU[employer] MANAGED MEDICARE / Plan: HU[employer] MEDICARE HMO / Product Type: Capitation /     Extended Emergency Contact Information  Primary Emergency Contact: Janel Batres  Address: [id number] [place] [address] [place] [address] [place]  Mobile Phone: [phone]  Relation: Spouse  Preferred language: English   needed? No    Discharge Plan A: Home  Discharge Plan B: Home, Home with family      [facility] - [place], [place] - [address]  [address] [address]  Phone: [phone] Fax: [phone]    [id number] - [place] - [address]  [address] [place] [address]  Phone: [phone] Fax: [phone]      Initial Assessment (most recent)     Adult Discharge Assessment - 05/23/22 1018        Discharge Assessment    Assessment Type Discharge Planning Assessment     Confirmed/corrected address, phone number and insurance Yes     Confirmed Demographics Correct on Facesheet     Source of Information family;health record     Communicated CHIARA with patient/caregiver Yes     Reason For Admission Oral Bleeding     Lives With spouse     Facility [facility]rived From: [facility]     Do you expect to return to your current living situation? Yes     Do you have help at home or someone to help you manage your care at home? Yes     Who are your caregiver(s) and their phone number(s)? Spouse-Janel BatresIwsef-[phone]     Prior to hospitilization cognitive status: Alert/Oriented     Current cognitive status: Alert/Oriented     Walking or Climbing Stairs Difficulty none     Dressing/Bathing Difficulty none     Equipment Currently Used at Home suction machine     Readmission  within 30 days? Yes     Patient currently being followed by outpatient case management? No     Do you currently have service(s) that help you manage your care at home? No     Do you take prescription medications? Yes     Do you have prescription coverage? Yes     Do you have any problems affording any of your prescribed medications? No     Is the patient taking medications as prescribed? yes   Pt's wife stated medical team stopped pt's Eliquis temporarily.    Who is going to help you get home at discharge? Spouse-Janel [phone]     How do you get to doctors appointments? car, drives self;family or friend will provide     Are you on dialysis? No     Do you take coumadin? No     Discharge Plan A Home     Discharge Plan B Home;Home with family     DME Needed Upon Discharge  none     Discharge Plan discussed with: Spouse/sig other     Discharge Barriers Identified None        Relationship/Environment    Name(s) of Who Lives With Patient Janel Batres-Wife.                 SW completed initial discharge assessment with pt's wife due to laryngectomy.  Hospital readmissions on file.  Pt's wife will provide transportation home.      Pt lives with his wife.  Pt does not receive coumadin, dialysis, HH, or behavioral health services.  Pt's wife reported the only DME at home is a suctioning machine.  Pt wife reported pt is compliant with his medications.  She further reported pt was temporarily taken off Eliquis.      Pt's wife expressed concerns about pt's difficult swallowing.  BOBO notified medical team of the above information.      Disp:  Awaiting labs.  Home no needs.    Heidi Gaines LMSW  PRN-  [facility]  Ext. [phone]

## 2022-05-23 NOTE — PT/OT/SLP EVAL
Speech Language Pathology Evaluation  Bedside Swallow    Patient Name:  Cyrus Batres Jr.   MRN:  [id number]  Admitting Diagnosis: Oral bleeding     PT IS A NECK BREATHER. DO NOT ORALLY INTUBATE.      Recommendations:                 General Recommendations:  Dysphagia therapy  Diet recommendations:  Mechanical soft, Thin   General Precautions: Standard,    Communication strategies:  provide increased time to answer and writing     History:     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       Past Surgical History:   Procedure Laterality Date    DIRECT LARYNGOBRONCHOSCOPY N/A 12/27/2021    Procedure: LARYNGOSCOPY, DIRECT, WITH BRONCHOSCOPY;  Surgeon: Flex Espinosa MD;  Location: [facility] OR [address] [place] [address];  Service: ENT;  Laterality: N/A;    DISSECTION OF NECK Bilateral 1/6/2022    Procedure: DISSECTION, NECK;  Surgeon: Jesse James MD;  Location: [facility] OR [address] [place] [address];  Service: ENT;  Laterality: Bilateral;    FLAP PROCEDURE Right 1/6/2022    Procedure: CREATION, FREE FLAP;  Surgeon: Alise Hart MD;  [facility];  Service: ENT;  Laterality: Right;  Ischemic start 1351  Ischemic stop 1502    LARYNGECTOMY N/A 1/6/2022    Procedure: LARYNGECTOMY;  Surgeon: Jesse James MD;  Location: [facility] OR [address] [place] [address];  Service: ENT;  Laterality: N/A;    LARYNGOSCOPY N/A 8/4/2020    Procedure: Suspension microlaryngoscopy with biopsy, possible KTP laser treatment/excision;  Surgeon: Stew Noel MD;  Location: [address];  Service: ENT;  Laterality: N/A;  Microscope, telescopes, tower, microinstruments, KTP laser, rep conf# [id number]  7/28.    LARYNGOSCOPY N/A 3/16/2021    Procedure: Suspension microlaryngoscopy with excision of lesion, possible CO2 laser;  Surgeon: Stew Noel MD;  Location: [address];  Service: ENT;   "Laterality: N/A;  Microscope, telescopes, tower, microinstruments, CO2 laser, rep conf# [id number] IC 3/4.    LARYNGOSCOPY N/A 4/1/2021    Procedure: Suspension microlaryngoscopy with KTP laser excision of lesion;  Surgeon: Stew Noel MD;  [facility];  Service: ENT;  Laterality: N/A;  Microscope, telescopes, tower, microinstruments, 70 degree scope, vocal fold , KTP laser, rep conf# [id number] BC    LARYNGOSCOPY N/A 12/9/2021    Procedure: Suspension microlaryngoscopy with biopsy;  Surgeon: Stew Noel MD;  Location: [address];  Service: ENT;  Laterality: N/A;  Microscope, telescopes, tower, microinstruments    LARYNGOSCOPY N/A 1/6/2022    Procedure: LARYNGOSCOPY;  Surgeon: Jesse James MD;  Location: [facility] OR [address] [place] [address];  Service: ENT;  Laterality: N/A;    LARYNGOSCOPY N/A 4/27/2022    Procedure: LARYNGOSCOPY WITH BIOPSY;  Surgeon: Jesse James MD;  Location: [address];  Service: ENT;  Laterality: N/A;    MICROLARYNGOSCOPY N/A 3/17/2020    Procedure: MICROLARYNGOSCOPY;  Surgeon: Jung Xiao MD;  Location: [facility];  Service: ENT;  Laterality: N/A;  Laser Microlaryngoscopy  NEED TO SCHEDULE LASER from [facility] [phone]    REIMPLANTATION OF PARATHYROID TISSUE N/A 1/6/2022    Procedure: REIMPLANTATION, PARATHYROID TISSUE;  Surgeon: Jesse James MD;  [facility];  Service: ENT;  Laterality: N/A;    THYROIDECTOMY  1/6/2022    Procedure: THYROIDECTOMY;  Surgeon: Jesse James MD;  Location: [facility] OR [address] [place] [address];  Service: ENT;;    TRACHEOSTOMY N/A 12/27/2021    Procedure: CREATION, TRACHEOSTOMY;  Surgeon: Flex Espinosa MD;  Location: [facility] OR [address] [place] [address];  Service: ENT;  Laterality: N/A;       Prior Intubation HX:  None this admit    Prior diet: Regular/thin.    Subjective     Awake/alert  "white bread gets really stuck."   Pain/Comfort:  · Pain Rating 1: 0/10  · Pain Rating Post-Intervention 1: 0/10    Respiratory " Status: Room air, adhesive base plate and HME in place    Objective:     Oral Musculature Evaluation  · Oral Musculature: WFL  · Dentition: present and adequate  · Oral Labial Strength and Mobility: WFL  · Lingual Strength and Mobility: WFL  · Voice Prior to PO Intake: unable to assess 2/2 laryngectomy    Bedside Swallow Eval:   Consistencies Assessed:  · Thin liquids x4 straw  · Solids x2     Oral Phase:   · WFL    Unable to assess pharyngeal phase of swallow 2/2 laryngectomy pt. Per ENT notes pt with esophageal stenosis around C4-C5. SLP and pt discussed diet recs. Per pt, he avoids troublesome foods, but is able to eat softer foods like scrambled eggs over a long period of time. Recommend possible GI consult for further assessment. Mechanical soft diet recommended at this time.       Assessment:     Cyrus Batres  is a 71 y.o. male     Goals:   Multidisciplinary Problems     SLP Goals     Not on file                Plan:   · Plan of Care reviewed with:  patient   · SLP Follow-Up:  No       Discharge recommendations:    resume outpatient ST services      Time Tracking:     SLP Treatment Date:   05/23/22  Speech Start Time:  0812  Speech Stop Time:  0827     Speech Total Time (min):  15 min    Billable Minutes: Eval Swallow and Oral Function 7 and Self Care/Home Management Training 8    05/23/2022

## 2022-05-23 NOTE — PROGRESS NOTES
[facility]  Otorhinolaryngology-Head & Neck Surgery  Progress Note    Subjective:     Post-Op Info:  * No surgery found *      Hospital Day: 4     Interval History: NAEON. Awaiting heme lab results.    Medications:  Continuous Infusions:  Scheduled Meds:   levothyroxine  100 mcg Oral Before breakfast    pantoprazole  40 mg Oral Daily     PRN Meds:acetaminophen, dextrose 10%, dextrose 10%, glucagon (human recombinant), glucose, glucose, melatonin, ondansetron, prochlorperazine, senna-docusate 8.6-50 mg, sodium chloride 0.9%, sodium chloride 0.9%     Review of patient's allergies indicates:   Allergen Reactions    Pollen extracts     Lovastatin Rash     Not confirmed but pt skeptical     Objective:     Vital Signs (24h Range):  Temp:  [97 °F (36.1 °C)-98.4 °F (36.9 °C)] 97.8 °F (36.6 °C)  Pulse:  [] 70  Resp:  [12-34] 15  SpO2:  [99 %-100 %] 99 %  BP: (109-137)/(58-77) 126/69     Lines/Drains/Airways       Drain  Duration                  Gastrostomy/Enterostomy 12/27/21 1152 Percutaneous endoscopic gastrostomy (PEG)  days              Airway  Duration                  Laryngectomy (Do Not Remove) -- days         Airway - Non-Surgical 04/27/22 1410 25 days              Peripheral Intravenous Line  Duration                  Peripheral IV - Single Lumen 05/20/22 1217 20 G Anterior;Left Hand 2 days                  Physical Exam  Resting comfortably on bed  Oral cavity with moist mucous membrane, no blood clots  Laryngeal stoma intact, no blood    Significant Labs:  BMP:   Recent Labs   Lab 05/20/22  1113 05/21/22  0344 05/23/22  0223   *   < > 108      < > 108   CO2 22*   < > 22*   BUN 26*   < > 17   CREATININE 0.9   < > 0.9   CALCIUM 9.0   < > 9.1   MG 2.0  --   --     < > = values in this interval not displayed.     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 Previously admitted 5/15-5/16. Now presenting again with hemoptysis. S/p IR angiogram with no active extravasation noted. Patient with persistently elevated PT. On Eliquis but held since admission.    -- Awaiting heme lab results (mixing studies PT/aPTT)  -- Puree diet  -- SLP evaluation for possible swallowing therapies  -- Please Secure Chat me for any questions, page ENT on-call if unresponsive or urgent    Dispo: Stable for discharge pending heme lab results        Beni Resendiz MD  Otorhinolaryngology-Head & Neck Surgery  [facility]

## 2022-05-23 NOTE — ANESTHESIA POSTPROCEDURE EVALUATION
Anesthesia Post Evaluation    Patient: Cyrus Batres Jr.    Procedure(s) Performed: * No procedures listed *    Final Anesthesia Type: general      Patient location during evaluation: ICU  Patient participation: Yes- Able to Participate  Level of consciousness: awake and alert and oriented  Post-procedure vital signs: reviewed and stable  Pain management: adequate  Airway patency: patent    PONV status at discharge: No PONV  Anesthetic complications: no      Cardiovascular status: blood pressure returned to baseline and hemodynamically stable  Respiratory status: spontaneous ventilation and Tracheostomy  Hydration status: euvolemic  Follow-up not needed.          Vitals Value Taken Time   /69 05/23/22 0352   Temp 36.6 °C (97.8 °F) 05/23/22 0352   Pulse 70 05/23/22 0352   Resp 15 05/23/22 0352   SpO2 99 % 05/23/22 0352         No case tracking events are documented in the log.      Pain/Alexi Score: Pain Rating Post Med Admin: 0 (5/23/2022  6:05 AM)

## 2022-05-23 NOTE — DISCHARGE SUMMARY
Nael Carey - [facility] [place]  Otorhinolaryngology-Head & Neck Surgery  Discharge Summary      Patient Name: Cyrus Batres Jr.  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 Patient previously admitted to our service on 5/15-5/16 for hemoptysis. The bleeding stabilized without any interventions and the patient was discharged. Patient reports bleeding episodes started again yesterday. CTA at that time with no obvious source of bleeding per read.          * No surgery found *      Indwelling Lines/Drains at time of discharge:   Lines/Drains/Airways     Drain  Duration                Gastrostomy/Enterostomy 12/27/21 1152 Percutaneous endoscopic gastrostomy (PEG)  days          Airway  Duration                Laryngectomy (Do Not Remove) -- days         Airway - Non-Surgical 04/27/22 1410 25 days              Hospital Course: Patient admitted on Friday after experiencing another bleeding from mouth. Eliquis held. Hematology consulted and recommended lab workup. PT initially elevated by back to baseline on 5/20. No evidence of DIC. Likely Eliquis as cause of elevated PT. No further bleeding over the weekend. Patient to continue holding Eliquis. Patient will discuss with cardiologist the risk and benefit of continuing Eliquis given two episodes of bleeding in the past week. Patient discharged on Monday in good condition.      Goals of Care Treatment Preferences:  Code Status: Full Code      Consults:   Consults (From admission, onward)        Status Ordering Provider     Inpatient consult to ENT  Once        Provider:  (Not yet assigned)    Completed DONNA RENO          Significant  "Diagnostic Studies: Labs: All labs within the past 24 hours have been reviewed    Pending Diagnostic Studies:     Procedure Component Value Units Date/Time    Protime-INR [[id number]]     Order Status: Sent Lab Status: No result     Specimen: Blood         Final Active Diagnoses:    Diagnosis Date Noted POA    PRINCIPAL PROBLEM:  Oral bleeding [K13.79] 05/20/2022 Yes      Problems Resolved During this Admission:      Discharged Condition: good    Disposition: Home or Self Care    Follow Up:    Patient Instructions:      No dressing needed     Activity as tolerated     Medications:  Reconciled Home Medications:      Medication List      CHANGE how you take these medications    losartan 100 MG tablet  Commonly known as: COZAAR  TAKE 1 TABLET BY MOUTH EVERY DAY  What changed: when to take this     pen needle, diabetic 32 gauge x 5/32" Ndle  Commonly known as: BD ULTRA-FINE YULISSA PEN NEEDLE  Use once weekly.  What changed:   · how much to take  · how to take this  · when to take this        CONTINUE taking these medications    aspirin 81 MG EC tablet  Commonly known as: ECOTRIN  Take 81 mg by mouth Daily.     blood sugar diagnostic Strp  Commonly known as: BLOOD GLUCOSE TEST  1 strip by Misc.(Non-Drug; Combo Route) route 2 (two) times daily before meals.     esomeprazole 20 MG capsule  Commonly known as: NEXIUM  Take 20 mg by mouth once daily.     levothyroxine 100 MCG tablet  Commonly known as: SYNTHROID  Take 1 tablet (100 mcg total) by mouth before breakfast.        STOP taking these medications    apixaban 5 mg Tab  Commonly known as: ELIQUIS          Time spent on the discharge of patient: 15 minutes    Beni Resendiz MD  Otorhinolaryngology-Head & Neck Surgery  [facility]  "

## 2022-05-23 NOTE — PLAN OF CARE
[facility]ael [facility]  Discharge Final Note    Primary Care Provider: Jesse James MD    Expected Discharge Date: 5/23/2022    Final Discharge Note (most recent)     Final Note - 05/23/22 1440        Final Note    Assessment Type Final Discharge Note     Anticipated Discharge Disposition Home or Self Care     Hospital Resources/Appts/Education Provided Appointments scheduled and added to AVS;Provided patient/caregiver with written discharge plan information        Post-Acute Status    Post-Acute Authorization Other     Other Status No Post-Acute Service Needs     Discharge Delays None known at this time                 Important Message from Medicare             Contact Info     Jesse James MD   Specialty: Otolaryngology   Relationship: PCP - General    [address] [place] [address]   Phone: [phone]       Next Steps: Follow up        Future Appointments   Date Time Provider Department Center   5/30/2022  9:00 AM [facility] RAD ONC, PHYSICIAN SCHED [facility] RAD ONC [facility] [place]   6/7/2022  2:00 PM Jesse James MD [facility] [place] [facility]   7/20/2022  2:00 PM Maico Patterson MD [facility] NIKA BUNCH MD [facility]     No other needs identified.  Pt's wife will provide transportation home.     Heidi Gaines LMSW  PRN-  [facility]  Ext. [phone]

## 2022-05-23 NOTE — ASSESSMENT & PLAN NOTE
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 Previously admitted 5/15-5/16. Now presenting again with hemoptysis. S/p IR angiogram with no active extravasation noted. Patient with persistently elevated PT. On Eliquis but held since admission.    -- Awaiting heme lab results (mixing studies PT/aPTT)  -- Puree diet  -- SLP evaluation for possible swallowing therapies  -- Please Secure Chat me for any questions, page ENT on-call if unresponsive or urgent    Dispo: Stable for discharge pending heme lab results

## 2022-05-23 NOTE — PLAN OF CARE
Plan of care reviewed with patient, who verbalized understanding. Pt describes pain as well controlled without use of narcotic pain medications. Pt is able to turn and position themselves, and has gotten out of bed, sat up in the chair and ambulated in the hallways. Pt is tolerating their dysphagia pureed diet without nausea, but has not had a bowel movement since 5/19. Ambulation in the halls should be encouraged this morning after a restful sleep.       Problem: Adult Inpatient Plan of Care  Goal: Plan of Care Review  Outcome: Ongoing, Progressing  Goal: Patient-Specific Goal (Individualized)  Outcome: Ongoing, Progressing  Goal: Absence of Hospital-Acquired Illness or Injury  Outcome: Ongoing, Progressing  Goal: Optimal Comfort and Wellbeing  Outcome: Ongoing, Progressing  Goal: Readiness for Transition of Care  Outcome: Ongoing, Progressing     Problem: Diabetes Comorbidity  Goal: Blood Glucose Level Within Targeted Range  Outcome: Ongoing, Progressing     Problem: Fall Injury Risk  Goal: Absence of Fall and Fall-Related Injury  Outcome: Ongoing, Progressing

## 2022-05-30 ENCOUNTER — OFFICE VISIT (OUTPATIENT)
Dept: RADIATION ONCOLOGY | Facility: CLINIC | Age: 72
End: 2022-05-30
Payer: MEDICARE

## 2022-05-30 ENCOUNTER — SOCIAL WORK (OUTPATIENT)
Dept: HEMATOLOGY/ONCOLOGY | Facility: CLINIC | Age: 72
End: 2022-05-30

## 2022-05-30 VITALS
RESPIRATION RATE: 18 BRPM | OXYGEN SATURATION: 100 % | WEIGHT: 140 LBS | HEIGHT: 67 IN | BODY MASS INDEX: 21.97 KG/M2 | HEART RATE: 84 BPM | DIASTOLIC BLOOD PRESSURE: 71 MMHG | TEMPERATURE: 98 F | SYSTOLIC BLOOD PRESSURE: 129 MMHG

## 2022-05-30 DIAGNOSIS — C32.9 LARYNX CANCER: Primary | ICD-10-CM

## 2022-05-30 PROCEDURE — 1111F PR DISCHARGE MEDS RECONCILED W/ CURRENT OUTPATIENT MED LIST: ICD-10-PCS | Mod: CPTII,S$GLB,, | Performed by: RADIOLOGY

## 2022-05-30 PROCEDURE — 1159F MED LIST DOCD IN RCRD: CPT | Mod: CPTII,S$GLB,, | Performed by: RADIOLOGY

## 2022-05-30 PROCEDURE — 1111F DSCHRG MED/CURRENT MED MERGE: CPT | Mod: CPTII,S$GLB,, | Performed by: RADIOLOGY

## 2022-05-30 PROCEDURE — 3060F PR POS MICROALBUMINURIA RESULT DOCUMENTED/REVIEW: ICD-10-PCS | Mod: CPTII,S$GLB,, | Performed by: RADIOLOGY

## 2022-05-30 PROCEDURE — 3066F PR DOCUMENTATION OF TREATMENT FOR NEPHROPATHY: ICD-10-PCS | Mod: CPTII,S$GLB,, | Performed by: RADIOLOGY

## 2022-05-30 PROCEDURE — 3078F DIAST BP <80 MM HG: CPT | Mod: CPTII,S$GLB,, | Performed by: RADIOLOGY

## 2022-05-30 PROCEDURE — 99215 OFFICE O/P EST HI 40 MIN: CPT | Mod: S$GLB,,, | Performed by: RADIOLOGY

## 2022-05-30 PROCEDURE — 3074F SYST BP LT 130 MM HG: CPT | Mod: CPTII,S$GLB,, | Performed by: RADIOLOGY

## 2022-05-30 PROCEDURE — 3288F FALL RISK ASSESSMENT DOCD: CPT | Mod: CPTII,S$GLB,, | Performed by: RADIOLOGY

## 2022-05-30 PROCEDURE — 3288F PR FALLS RISK ASSESSMENT DOCUMENTED: ICD-10-PCS | Mod: CPTII,S$GLB,, | Performed by: RADIOLOGY

## 2022-05-30 PROCEDURE — 1125F AMNT PAIN NOTED PAIN PRSNT: CPT | Mod: CPTII,S$GLB,, | Performed by: RADIOLOGY

## 2022-05-30 PROCEDURE — 3060F POS MICROALBUMINURIA REV: CPT | Mod: CPTII,S$GLB,, | Performed by: RADIOLOGY

## 2022-05-30 PROCEDURE — 1160F RVW MEDS BY RX/DR IN RCRD: CPT | Mod: CPTII,S$GLB,, | Performed by: RADIOLOGY

## 2022-05-30 PROCEDURE — 3044F PR MOST RECENT HEMOGLOBIN A1C LEVEL <7.0%: ICD-10-PCS | Mod: CPTII,S$GLB,, | Performed by: RADIOLOGY

## 2022-05-30 PROCEDURE — 3066F NEPHROPATHY DOC TX: CPT | Mod: CPTII,S$GLB,, | Performed by: RADIOLOGY

## 2022-05-30 PROCEDURE — 1125F PR PAIN SEVERITY QUANTIFIED, PAIN PRESENT: ICD-10-PCS | Mod: CPTII,S$GLB,, | Performed by: RADIOLOGY

## 2022-05-30 PROCEDURE — 3074F PR MOST RECENT SYSTOLIC BLOOD PRESSURE < 130 MM HG: ICD-10-PCS | Mod: CPTII,S$GLB,, | Performed by: RADIOLOGY

## 2022-05-30 PROCEDURE — 99215 PR OFFICE/OUTPT VISIT, EST, LEVL V, 40-54 MIN: ICD-10-PCS | Mod: S$GLB,,, | Performed by: RADIOLOGY

## 2022-05-30 PROCEDURE — 1160F PR REVIEW ALL MEDS BY PRESCRIBER/CLIN PHARMACIST DOCUMENTED: ICD-10-PCS | Mod: CPTII,S$GLB,, | Performed by: RADIOLOGY

## 2022-05-30 PROCEDURE — 3008F PR BODY MASS INDEX (BMI) DOCUMENTED: ICD-10-PCS | Mod: CPTII,S$GLB,, | Performed by: RADIOLOGY

## 2022-05-30 PROCEDURE — 3008F BODY MASS INDEX DOCD: CPT | Mod: CPTII,S$GLB,, | Performed by: RADIOLOGY

## 2022-05-30 PROCEDURE — 3044F HG A1C LEVEL LT 7.0%: CPT | Mod: CPTII,S$GLB,, | Performed by: RADIOLOGY

## 2022-05-30 PROCEDURE — 4010F PR ACE/ARB THEARPY RXD/TAKEN: ICD-10-PCS | Mod: CPTII,S$GLB,, | Performed by: RADIOLOGY

## 2022-05-30 PROCEDURE — 1159F PR MEDICATION LIST DOCUMENTED IN MEDICAL RECORD: ICD-10-PCS | Mod: CPTII,S$GLB,, | Performed by: RADIOLOGY

## 2022-05-30 PROCEDURE — 4010F ACE/ARB THERAPY RXD/TAKEN: CPT | Mod: CPTII,S$GLB,, | Performed by: RADIOLOGY

## 2022-05-30 PROCEDURE — 1101F PT FALLS ASSESS-DOCD LE1/YR: CPT | Mod: CPTII,S$GLB,, | Performed by: RADIOLOGY

## 2022-05-30 PROCEDURE — 1101F PR PT FALLS ASSESS DOC 0-1 FALLS W/OUT INJ PAST YR: ICD-10-PCS | Mod: CPTII,S$GLB,, | Performed by: RADIOLOGY

## 2022-05-30 PROCEDURE — 3078F PR MOST RECENT DIASTOLIC BLOOD PRESSURE < 80 MM HG: ICD-10-PCS | Mod: CPTII,S$GLB,, | Performed by: RADIOLOGY

## 2022-05-30 RX ORDER — DILTIAZEM HYDROCHLORIDE 120 MG/1
120 CAPSULE, EXTENDED RELEASE ORAL DAILY
Status: ON HOLD | COMMUNITY
End: 2022-06-10 | Stop reason: HOSPADM

## 2022-05-30 NOTE — Clinical Note
Diego-- I think surgery would be best if feasible. Tumor grew through rads before and risk of carotid blowout can be relegated to salvage circumstance if this surgery fails. Thoughts? Lizzeth--bleeding has stopped off eliquis

## 2022-05-30 NOTE — PROGRESS NOTES
Cyrus Batres JrParam  [id number]  1950 5/30/2022  Stew Noel Md  [address] [place] [address]    REASON FOR CONSULTATION: II, [id number] poor diff SCCa of L BOT    TREATMENT GOAL: primary    HISTORY OF PRESENT ILLNESS:   71 y.o. male  never smoker who presented with intermittent hoarseness and weakening of the voice throughout the day with persistent cough. He has a +FHx of similar diagnosis in his father who was a smoker (he reports benign--laryngeal polyps?).     Patient was evaluated by Dr. Xiao with DFL noting irregularity at L TVC and short interval f/u with microlaryngoscopy where he noted the lesion at the superior surface of the left vocal cord without definitive invasion of the anterior commissure and possibly 1 mm of subglottic extension. Biopsy from the left true vocal cord demonstrated severe dysplastic changes without definitive invasive carcinoma though suspicious.  The left anterior commissure biopsy demonstrated moderate dysplasia without malignancy. CT of the neck and soft tissues appreciating an enhancing 6 mm focus at the superior aspect of the left vocal cord near the anterior commissure.     Follow-up DFL was suspicious for invasive tumor with four month follow-up CT of the neck and soft tissues demonstrating enlargement to 1.4 x 1.1 x 0.7 cm without evidence of pathologic lymphadenopathy.  Dr. Xiao referred to Dr. Noel who performed FLV suspicious for malignancy then microlaryngoscopy noting bulky tumor extending into the left true vocal fold, crossing the anterior commissure to involve the left false vocal cord with infraglottic extent x 8 mm. Aggressive debulking revealed the tumor to have deeply invaded with pathology from left true vocal cord demonstrating moderately differentiated SCCA without lymphovascular or perineural invasion.     CT of the chest was clear.  His case was reviewed by multidisciplinary tumor board that recommended definitive radiotherapy.  I  discussed in detail with Dr. Noel who reports extensive debulking of the tumor.     Mr. Batres completed IMRT to his larynx and bilateral necks via SiB totaling 6996 cGy on 10/30/2020.     Following completion of radiotherapy patient has continued under the care of Dr. Noel requiring microlaryngoscopy with stripping of persistent disease.  He has continued on surveillance with suggestion of progressive disease that progressed to hemoptysis.      He was ultimately taken for salvage laryngectomy by Dr. James:              - 4.2cm g1-2 keratinizing SCCa invading the left lobe of thyroid gland.              - margin close at 1 mm near right thyroid cartilage              - 0/50 LNs              - pT4aN0    I met with him postoperatively as did Dr. Khoobehi, and no further adjuvant radiotherapy or systemic therapy was recommended.    Patient has been following with PT/ST and recently presented with hemoptysis.  CT of the neck was remarkable for 2.1 cm enhancing mass at the left base of tongue.  Dr. James took the patient for DL revealing a mass at the base of the tongue at junction of the neopharynx, crossing midline and extending to the lateral pharyngeal wall, biopsy confirming a poorly differentiated SCCA, p16 negative.  PET-CT confirmed a 2.9 x 2.5 cm left base of tongue mass, SUV 11.4 with inferior nodular extension measuring 1.3 mm in size, SUV 8.3.  There was no evidence of regional lymphadenopathy or distant disease.    He has presented multiple times to the hospital with recurrent bleeding on Eliquis, now held.  CTA neck was negative for arterial involvement. He was evaluated by IR for potential lingual artery embolization, ultimately without intervention.  Arteriogram demonstrated hypervascular tumor without extravasation, pseudoaneurysm or fistula.    He returns to discuss radiotherapeutic options.    Reports pain and difficulty with swallowing.  He sees only trace blood in his sputum, now off of Eliquis.   He denies fever, chills, chest pain, shortness of breath, cough or hemoptysis.    Review of systems otherwise negative unless indicated in HPI.    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     Past Surgical History:   Procedure Laterality Date    DIRECT LARYNGOBRONCHOSCOPY N/A 12/27/2021    Procedure: LARYNGOSCOPY, DIRECT, WITH BRONCHOSCOPY;  Surgeon: Flex Espinosa MD;  [facility];  Service: ENT;  Laterality: N/A;    DISSECTION OF NECK Bilateral 1/6/2022    Procedure: DISSECTION, NECK;  Surgeon: Jesse James MD;  Location: [facility] OR [facility];  Service: ENT;  Laterality: Bilateral;    FLAP PROCEDURE Right 1/6/2022    Procedure: CREATION, FREE FLAP;  Surgeon: Alise Hart MD;  [facility];  Service: ENT;  Laterality: Right;  Ischemic start 1351  Ischemic stop 1502    LARYNGECTOMY N/A 1/6/2022    Procedure: LARYNGECTOMY;  Surgeon: Jesse James MD;  [facility];  Service: ENT;  Laterality: N/A;    LARYNGOSCOPY N/A 8/4/2020    Procedure: Suspension microlaryngoscopy with biopsy, possible KTP laser treatment/excision;  Surgeon: Stew Noel MD;  [facility];  Service: ENT;  Laterality: N/A;  Microscope, telescopes, tower, microinstruments, KTP laser, rep conf# [id number] IC 7/28.    LARYNGOSCOPY N/A 3/16/2021    Procedure: Suspension microlaryngoscopy with excision of lesion, possible CO2 laser;  Surgeon: Stew Noel MD;  [facility];  Service: ENT;  Laterality: N/A;  Microscope, telescopes, tower, microinstruments, CO2 laser, rep conf# [id number] IC 3/4.    LARYNGOSCOPY N/A 4/1/2021    Procedure: Suspension microlaryngoscopy with KTP laser excision of lesion;  Surgeon: Stew Noel MD;  [facility];  Service: ENT;  Laterality:  N/A;  Microscope, telescopes, tower, microinstruments, 70 degree scope, vocal fold , KTP laser, rep conf# [id number] BC    LARYNGOSCOPY N/A 12/9/2021    Procedure: Suspension microlaryngoscopy with biopsy;  Surgeon: Stew Noel MD;  [facility];  Service: ENT;  Laterality: N/A;  Microscope, telescopes, tower, microinstruments    LARYNGOSCOPY N/A 1/6/2022    Procedure: LARYNGOSCOPY;  Surgeon: Jesse James MD;  [facility];  Service: ENT;  Laterality: N/A;    LARYNGOSCOPY N/A 4/27/2022    Procedure: LARYNGOSCOPY WITH BIOPSY;  Surgeon: Jesse James MD;  [facility];  Service: ENT;  Laterality: N/A;    MICROLARYNGOSCOPY N/A 3/17/2020    Procedure: MICROLARYNGOSCOPY;  Surgeon: Jung Xiao MD;  Location: [facility];  Service: ENT;  Laterality: N/A;  Laser Microlaryngoscopy  NEED TO SCHEDULE LASER from [facility] [id number]    REIMPLANTATION OF PARATHYROID TISSUE N/A 1/6/2022    Procedure: REIMPLANTATION, PARATHYROID TISSUE;  Surgeon: Jesse James MD;  [facility];  Service: ENT;  Laterality: N/A;    THYROIDECTOMY  1/6/2022    Procedure: THYROIDECTOMY;  Surgeon: Jesse James MD;  [facility];  Service: ENT;;    TRACHEOSTOMY N/A 12/27/2021    Procedure: CREATION, TRACHEOSTOMY;  Surgeon: Flex Espinosa MD;  [facility];  Service: ENT;  Laterality: N/A;     Social History     Socioeconomic History    Marital status:      Spouse name: Janel Batres    Number of children: 2   Occupational History    Occupation: [employer]     Employer: [employer]   Tobacco Use    Smoking status: Never Smoker    Smokeless tobacco: Never Used   Substance and Sexual Activity    Alcohol use: Not Currently     Comment: occasional    Drug use: No    Sexual activity: Yes     Partners: Female   Social History Narrative    2 children from his 1st wife     Family History   Problem Relation Age of  "Onset    Abnormal EKG Mother     Diabetes Father     Heart disease Father     Hypertension Father        PRIOR HISTORY OF CHEMOTHERAPY OR RADIOTHERAPY: Please see HPI for patients prior oncologic history.    Medication List with Changes/Refills   Current Medications    ASPIRIN (ECOTRIN) 81 MG EC TABLET    Take 81 mg by mouth Daily.    BLOOD SUGAR DIAGNOSTIC (BLOOD GLUCOSE TEST) STRP    1 strip by Misc.(Non-Drug; Combo Route) route 2 (two) times daily before meals.    DILTIAZEM HCL (TIAZAC) 120 MG 24 HR CAPSULE    Take 120 mg by mouth once daily.    ESOMEPRAZOLE (NEXIUM) 20 MG CAPSULE    Take 20 mg by mouth once daily.    LEVOTHYROXINE (SYNTHROID) 100 MCG TABLET    Take 1 tablet (100 mcg total) by mouth before breakfast.    LOSARTAN (COZAAR) 100 MG TABLET    TAKE 1 TABLET BY MOUTH EVERY DAY    PEN NEEDLE, DIABETIC (BD ULTRA-FINE YULISSA PEN NEEDLE) 32 GAUGE X 5/32" NDLE    Use once weekly.     Review of patient's allergies indicates:   Allergen Reactions    Pollen extracts     Lovastatin Rash     Not confirmed but pt skeptical       QUALITY OF LIFE: 80%- Normal Activity with Effort: Some Symptoms of Disease    Vitals:    05/30/22 0905   BP: 129/71   Pulse: 84   Resp: 18   Temp: 98.2 °F (36.8 °C)   TempSrc: Oral   SpO2: 100%   Weight: 63.5 kg (140 lb)   Height: 5' 6.5" (1.689 m)   PainSc:   2   PainLoc: Throat     Body mass index is 22.26 kg/m².    PHYSICAL EXAM:   GENERAL: alert; in no apparent distress.   HEAD: normocephalic, atraumatic.  EYES: pupils are equal, round, reactive to light and accommodation. Sclera anicteric. Conjunctiva not injected.   NOSE/THROAT: no nasal erythema or rhinorrhea. Oropharynx pink, without erythema, ulcerations or thrush. Aphonic  NECK: no cervical motion rigidity; supple with no masses.  Flap well vascularized  CHEST: Patient is speaking comfortably on room air with normal work of breathing without using accessory muscles of respiration.  CARDIOVASCULAR: regular rate and " rhythm  ABDOMEN: soft, nontender, nondistended.  MUSCULOSKELETAL: no tenderness to palpation along the spine or scapulae. Normal range of motion.  NEUROLOGIC: cranial nerves II-XII intact bilaterally. Strength 5/5 in bilateral upper and lower extremities. No sensory deficits appreciated. Normal gait.  LYMPHATIC: no cervical, supraclavicular adenopathy appreciated bilaterally.   EXTREMITIES: no clubbing, cyanosis, edema.  SKIN: no erythema, rashes, ulcerations noted.     REVIEW OF IMAGING/PATHOLOGY/LABS: Please see HPI. All images reviewed personally by dictating physician.     ASSESSMENT: 71 y.o. male with stage II, [id number] poor diff SCCa of L BOT.  PLAN:  Cyrus Batres Jr. presents with prior history of squamous cell carcinoma of the larynx status post debulking followed by IMRT to larynx and bilateral necks to 6996 cGy ending October 2020 with subsequent persistent/recurrent disease refractory to stripping, ultimately requiring salvage laryngectomy revealing a 4.2cm g1-2 SCCa with 1mm margin not followed by adjuvant treatment.  He now presents with biopsy-proven disease at the left base of tongue/neopharynx, a poorly differentiated squamous cell carcinoma.  PET-CT was negative for lymphadenopathy or distant disease.    Today I provided illustrations demonstrating location of this tumor relative to his prior radiotherapy fields.  I fused his PET-CT to the previous treatment which demonstrates gross disease to be within his prior high-dose region based on regional anatomy (larynx has been removed).  I explained that salvage radiotherapy would include the gross disease only without elective treatment volume but that this high-dose region of 7000cGy would overlap with prior 6996cGy region, particularly at the left sided vasculature putting him at risk for carotid blowout.  We do have adequate room on the spinal cord and the remaining organs at risk, however.  His tumor previously persisted through radiotherapy and in  "this circumstance I would recommend chemosensitization but prior persistence concerns me regarding proposed efficacy.  For these reasons, I strongly recommend consideration of definitive surgical resection, reserving radiotherapy for last option salvage should surgery fail.  I do believe his KPS warrants an aggressive "definitive intent" option.  I explained this in detail for the patient today who expressed understanding.  I spoke with Dr. James as well and will ask him to meet with the patient to discuss.     The patient has our contact information and understands that they are free to contact us at any time with questions or concerns regarding radiation therapy.     DISPOSITION: RTC post-op     I have personally seen and evaluated this patient with a high complexity diagnosis.      Greater than 60 minutes were dedicated to reviewing/interpreting pertinent laboratory/imaging/pathology as well as prior consultations; reviewing and performing history and physical; counseling patient on oncologic recommendations; documentation in the electronic medical record including ordering of additional tests and/or radiation treatment protocol; and coordination of care with physicians with referrals placed as appropriate.     COVID-19 precautions discussed. Cancer Center policy for COVID-19 testing described.  Patient will be required to wear a mask when in the Cancer Center.    PHYSICIAN: Matheus Portillo Jr, MD    Thank you for the opportunity to meet and consult with Cyrus Batres Jr..   Please feel free to contact me to discuss the above recommendation further.            "

## 2022-05-30 NOTE — PROGRESS NOTES
Mr. Cyrus Batres is a 71 year old diagnosed with larynx cancer.  He is here for a radiation consult with Dr. Portillo.  I met with him to complete new patient orientation and the NCCN Distress Screening; he indicated a rating of 0.  Patient denied needing the number to access the [facility] financial assistance application.  He denied needing psychosocial support.  I provided him with my contact information in the event he needs supportive services.

## 2022-06-02 ENCOUNTER — PATIENT MESSAGE (OUTPATIENT)
Dept: OTOLARYNGOLOGY | Facility: CLINIC | Age: 72
End: 2022-06-02
Payer: MEDICARE

## 2022-06-07 ENCOUNTER — OFFICE VISIT (OUTPATIENT)
Dept: SURGICAL ONCOLOGY | Facility: CLINIC | Age: 72
End: 2022-06-07
Payer: MEDICARE

## 2022-06-07 VITALS — TEMPERATURE: 98 F | BODY MASS INDEX: 21.77 KG/M2 | HEIGHT: 67 IN | WEIGHT: 138.69 LBS

## 2022-06-07 DIAGNOSIS — C32.9 LARYNX CANCER: Primary | ICD-10-CM

## 2022-06-07 PROCEDURE — 3066F PR DOCUMENTATION OF TREATMENT FOR NEPHROPATHY: ICD-10-PCS | Mod: CPTII,S$GLB,, | Performed by: OTOLARYNGOLOGY

## 2022-06-07 PROCEDURE — 3060F PR POS MICROALBUMINURIA RESULT DOCUMENTED/REVIEW: ICD-10-PCS | Mod: CPTII,S$GLB,, | Performed by: OTOLARYNGOLOGY

## 2022-06-07 PROCEDURE — 3060F POS MICROALBUMINURIA REV: CPT | Mod: CPTII,S$GLB,, | Performed by: OTOLARYNGOLOGY

## 2022-06-07 PROCEDURE — 4010F ACE/ARB THERAPY RXD/TAKEN: CPT | Mod: CPTII,S$GLB,, | Performed by: OTOLARYNGOLOGY

## 2022-06-07 PROCEDURE — 99213 PR OFFICE/OUTPT VISIT, EST, LEVL III, 20-29 MIN: ICD-10-PCS | Mod: S$GLB,,, | Performed by: OTOLARYNGOLOGY

## 2022-06-07 PROCEDURE — 3288F FALL RISK ASSESSMENT DOCD: CPT | Mod: CPTII,S$GLB,, | Performed by: OTOLARYNGOLOGY

## 2022-06-07 PROCEDURE — 3066F NEPHROPATHY DOC TX: CPT | Mod: CPTII,S$GLB,, | Performed by: OTOLARYNGOLOGY

## 2022-06-07 PROCEDURE — 1111F PR DISCHARGE MEDS RECONCILED W/ CURRENT OUTPATIENT MED LIST: ICD-10-PCS | Mod: CPTII,S$GLB,, | Performed by: OTOLARYNGOLOGY

## 2022-06-07 PROCEDURE — 1125F AMNT PAIN NOTED PAIN PRSNT: CPT | Mod: CPTII,S$GLB,, | Performed by: OTOLARYNGOLOGY

## 2022-06-07 PROCEDURE — 1101F PT FALLS ASSESS-DOCD LE1/YR: CPT | Mod: CPTII,S$GLB,, | Performed by: OTOLARYNGOLOGY

## 2022-06-07 PROCEDURE — 1125F PR PAIN SEVERITY QUANTIFIED, PAIN PRESENT: ICD-10-PCS | Mod: CPTII,S$GLB,, | Performed by: OTOLARYNGOLOGY

## 2022-06-07 PROCEDURE — 1101F PR PT FALLS ASSESS DOC 0-1 FALLS W/OUT INJ PAST YR: ICD-10-PCS | Mod: CPTII,S$GLB,, | Performed by: OTOLARYNGOLOGY

## 2022-06-07 PROCEDURE — 99999 PR PBB SHADOW E&M-EST. PATIENT-LVL III: CPT | Mod: PBBFAC,,, | Performed by: OTOLARYNGOLOGY

## 2022-06-07 PROCEDURE — 1111F DSCHRG MED/CURRENT MED MERGE: CPT | Mod: CPTII,S$GLB,, | Performed by: OTOLARYNGOLOGY

## 2022-06-07 PROCEDURE — 1159F MED LIST DOCD IN RCRD: CPT | Mod: CPTII,S$GLB,, | Performed by: OTOLARYNGOLOGY

## 2022-06-07 PROCEDURE — 3044F PR MOST RECENT HEMOGLOBIN A1C LEVEL <7.0%: ICD-10-PCS | Mod: CPTII,S$GLB,, | Performed by: OTOLARYNGOLOGY

## 2022-06-07 PROCEDURE — 3044F HG A1C LEVEL LT 7.0%: CPT | Mod: CPTII,S$GLB,, | Performed by: OTOLARYNGOLOGY

## 2022-06-07 PROCEDURE — 1160F RVW MEDS BY RX/DR IN RCRD: CPT | Mod: CPTII,S$GLB,, | Performed by: OTOLARYNGOLOGY

## 2022-06-07 PROCEDURE — 3288F PR FALLS RISK ASSESSMENT DOCUMENTED: ICD-10-PCS | Mod: CPTII,S$GLB,, | Performed by: OTOLARYNGOLOGY

## 2022-06-07 PROCEDURE — 1159F PR MEDICATION LIST DOCUMENTED IN MEDICAL RECORD: ICD-10-PCS | Mod: CPTII,S$GLB,, | Performed by: OTOLARYNGOLOGY

## 2022-06-07 PROCEDURE — 99999 PR PBB SHADOW E&M-EST. PATIENT-LVL III: ICD-10-PCS | Mod: PBBFAC,,, | Performed by: OTOLARYNGOLOGY

## 2022-06-07 PROCEDURE — 99213 OFFICE O/P EST LOW 20 MIN: CPT | Mod: S$GLB,,, | Performed by: OTOLARYNGOLOGY

## 2022-06-07 PROCEDURE — 3008F PR BODY MASS INDEX (BMI) DOCUMENTED: ICD-10-PCS | Mod: CPTII,S$GLB,, | Performed by: OTOLARYNGOLOGY

## 2022-06-07 PROCEDURE — 4010F PR ACE/ARB THEARPY RXD/TAKEN: ICD-10-PCS | Mod: CPTII,S$GLB,, | Performed by: OTOLARYNGOLOGY

## 2022-06-07 PROCEDURE — 3008F BODY MASS INDEX DOCD: CPT | Mod: CPTII,S$GLB,, | Performed by: OTOLARYNGOLOGY

## 2022-06-07 PROCEDURE — 1160F PR REVIEW ALL MEDS BY PRESCRIBER/CLIN PHARMACIST DOCUMENTED: ICD-10-PCS | Mod: CPTII,S$GLB,, | Performed by: OTOLARYNGOLOGY

## 2022-06-07 NOTE — PROGRESS NOTES
Chief Complaint   Patient presents with    Follow-up     2 month f/u     Oncology History   Larynx cancer   8/4/2020 Initial Diagnosis    Larynx neoplasm malignant     8/6/2020 Tumor Conference    His case was discussed at the Multidisciplinary Head and Neck Team Planning Meeting.    Representatives from Medical Oncology, Radiation Oncology, Head and Neck Surgical Oncology, Psychosocial Oncology, and Speech and Language Pathology discussed the case with the following recommendations:    1) definitive radiation              8/6/2020 Cancer Staged    Staging form: Larynx - Glottis, AJCC 8th Edition  - Clinical stage from 8/6/2020: Stage II (cT2, cN0, cM0)     8/6/2020 Cancer Staged    Cancer Staging  Larynx neoplasm malignant  Staging form: Larynx - Glottis, AJCC 8th Edition  - Clinical stage from 8/6/2020: Stage II (cT2, cN0, cM0) - Signed by Fariha Muñoz NP on 8/6/2020 12/16/2021 Tumor Conference    His case was discussed at the Multidisciplinary Head and Neck Team Planning Meeting.    Representatives from Medical Oncology, Radiation Oncology, Head and Neck Surgical Oncology, Psychosocial Oncology, and Speech and Language Pathology discussed the case with the following recommendations:    1) TL  2) oncology referral  3) psychology referral            12/27/2021 Surgery    1. DL And tracheostomy  2. PEG     1/6/2022 Surgery    1. Diagnostic direct laryngoscopy  2. Bilateral modified neck dissection of levels 2 through 4  3. Total laryngectomy  4. Total thyroidectomy with bilateral paratracheal/upper mediastinal neck dissection  5. Autotransplantation of left inferior parathyroid into left sternocleidomastoid muscle   6. Left anterolateral thigh free flap for closure of total laryngectomy neck skin defect  7. Advancement flap for closure of left thigh donor site advanced area was 25 x 10 cm     1/20/2022 Cancer Staged    Staging form: Larynx - Glottis, AJCC 8th Edition  - Pathologic stage from 1/20/2022:  Stage LOU (pT4a, pN0, cM0)     5/30/2022 Cancer Staged    Staging form: Pharynx - P16 Negative Oropharynx, AJCC 8th Edition  - Clinical: Stage II (rcT2, cN0, cM0)           HPI   71 y.o. male  returns for a post op visit.  He was recently diagnosed with a new squamous cell carcinoma involving the left base of tongue as well as the neopharynx.  He has experienced several bleeding episodes.  He was admitted for observation underwent angiogram which revealed no arterial bleeding.  He has seen Dr. Portillo in Radiation Oncology who does not feel that he is a candidate for repeat radiation therapy.  PET-CT scan revealed no evidence of metastatic disease.    Review of Systems   Constitutional: Negative for fatigue and unexpected weight change.   HENT: Per HPI.  Eyes: Negative for visual disturbance.   Respiratory: Negative for shortness of breath, hemoptysis   Cardiovascular: Negative for chest pain and palpitations.   Musculoskeletal: Negative for decreased ROM, back pain.   Skin: Negative for rash, sunburn, itching.   Neurological: Negative for dizziness and seizures.   Hematological: Negative for adenopathy. Does not bruise/bleed easily.   Endocrine: Negative for rapid weight loss/weight gain, heat/cold intolerance.     Past Medical History   Patient Active Problem List   Diagnosis    Melanocytic nevus    Body mass index (BMI) of 29.0-29.9 in adult    Benign hypertension    Overweight    Paroxysmal atrial fibrillation    Type 2 diabetes mellitus with diabetic arthropathy, with long-term current use of insulin    Fatty liver disease, nonalcoholic    False positive serological test for hepatitis C    Hyperlipidemia    Type 2 diabetes mellitus with hyperglycemia, without long-term current use of insulin    Gastroesophageal reflux disease without esophagitis    Type 2 diabetes mellitus with hyperglycemia    Vitamin B12 deficiency    Hyperuricemia    Hypovitaminosis D    Proteinuria    On enteral nutrition     Larynx cancer    Dehydration    NSVT (nonsustained ventricular tachycardia)    Hemoptysis    Adverse effect of adrenal cortical steroids, sequela    S/P laryngectomy    Hypocalcemia    Aphonia    Dysphagia, pharyngoesophageal    Acute pain of both shoulders    Bilateral arm weakness    Oral bleeding           Past Surgical History   Past Surgical History:   Procedure Laterality Date    DIRECT LARYNGOBRONCHOSCOPY N/A 12/27/2021    Procedure: LARYNGOSCOPY, DIRECT, WITH BRONCHOSCOPY;  Surgeon: Flex Espinosa MD;  Location: [facility] OR [facility] F[facility];  Service: ENT;  Laterality: N/A;    DISSECTION OF NECK Bilateral 1/6/2022    Procedure: DISSECTION, NECK;  Surgeon: Jesse James MD;  Location: [facility] OR [facility] FLR;  Service: ENT;  Laterality: Bilateral;    FLAP PROCEDURE Right 1/6/2022    Procedure: CREATION, FREE FLAP;  Surgeon: Alise Hart MD;  [facility];  Service: ENT;  Laterality: Right;  Ischemic start 1351  Ischemic stop 1502    LARYNGECTOMY N/A 1/6/2022    Procedure: LARYNGECTOMY;  Surgeon: Jesse James MD;  [facility];  Service: ENT;  Laterality: N/A;    LARYNGOSCOPY N/A 8/4/2020    Procedure: Suspension microlaryngoscopy with biopsy, possible KTP laser treatment/excision;  Surgeon: Stew Noel MD;  [facility];  Service: ENT;  Laterality: N/A;  Microscope, telescopes, tower, microinstruments, KTP laser, rep conf# [id number] IC 7/28.    LARYNGOSCOPY N/A 3/16/2021    Procedure: Suspension microlaryngoscopy with excision of lesion, possible CO2 laser;  Surgeon: Stew Noel MD;  [facility];  Service: ENT;  Laterality: N/A;  Microscope, telescopes, tower, microinstruments, CO2 laser, rep conf# [id number] IC 3/4.    LARYNGOSCOPY N/A 4/1/2021    Procedure: Suspension microlaryngoscopy with KTP laser excision of lesion;  Surgeon: Stew Noel MD;  [facility];  Service: ENT;  Laterality: N/A;  Microscope,  telescopes, tower, microinstruments, 70 degree scope, vocal fold , KTP laser, rep conf# [id number] BC    LARYNGOSCOPY N/A 12/9/2021    Procedure: Suspension microlaryngoscopy with biopsy;  Surgeon: Stew Noel MD;  [facility];  Service: ENT;  Laterality: N/A;  Microscope, telescopes, tower, microinstruments    LARYNGOSCOPY N/A 1/6/2022    Procedure: LARYNGOSCOPY;  Surgeon: Jesse James MD;  Location: [facility] OR [facility];  Service: ENT;  Laterality: N/A;    LARYNGOSCOPY N/A 4/27/2022    Procedure: LARYNGOSCOPY WITH BIOPSY;  Surgeon: Jesse James MD;  [facility];  Service: ENT;  Laterality: N/A;    MICROLARYNGOSCOPY N/A 3/17/2020    Procedure: MICROLARYNGOSCOPY;  Surgeon: Jung Xiao MD;  Location: [facility];  Service: ENT;  Laterality: N/A;  Laser Microlaryngoscopy  NEED TO SCHEDULE LASER from [place] [phone]    REIMPLANTATION OF PARATHYROID TISSUE N/A 1/6/2022    Procedure: REIMPLANTATION, PARATHYROID TISSUE;  Surgeon: Jesse James MD;  [facility];  Service: ENT;  Laterality: N/A;    THYROIDECTOMY  1/6/2022    Procedure: THYROIDECTOMY;  Surgeon: Jesse James MD;  [facility];  Service: ENT;;    TRACHEOSTOMY N/A 12/27/2021    Procedure: CREATION, TRACHEOSTOMY;  Surgeon: Flex Espinosa MD;  [facility];  Service: ENT;  Laterality: N/A;         Family History   Family History   Problem Relation Age of Onset    Abnormal EKG Mother     Diabetes Father     Heart disease Father     Hypertension Father            Social History   .  Social History     Socioeconomic History    Marital status:      Spouse name: Janel Batres    Number of children: 2   Occupational History    Occupation: [employer]     Employer: [employer]   Tobacco Use    Smoking status: Never Smoker    Smokeless tobacco: Never Used   Substance and Sexual Activity    Alcohol use: Not Currently     Comment:  occasional    Drug use: No    Sexual activity: Yes     Partners: Female   Social History Narrative    2 children from his 1st wife         Allergies   Review of patient's allergies indicates:   Allergen Reactions    Pollen extracts     Lovastatin Rash     Not confirmed but pt skeptical           Physical Exam     Vitals:    06/07/22 1405   Temp: 98.1 °F (36.7 °C)         Body mass index is 22.05 kg/m².      General: AOx3, NAD   Respiratory:  Symmetric chest rise, normal effort  Oral Cavity:  Oral Tongue mobile, no lesions noted. Hard Palate WNL. No buccal or FOM lesions.  Oropharynx:  No masses/lesions of the posterior pharyngeal wall. Tonsillar fossa without lesions. Soft palate without masses. Midline uvula.   Neck: Stoma widely patent. Skin paddle healthy with good color and turgor.Well-healed neck incisions.No LAD. Moderate post-op, post-treatment fibrosis.   Face: House Brackmann I bilaterally.    CTA/PET-CT reviewed.      Assessment/Plan  Problem List Items Addressed This Visit        Oncology    Larynx cancer - Primary     Recurrent disease status post radiation and total laryngectomy.  Now with disease involving the tongue base and neopharynx.  His disease is surgically resectable would require resection of the tongue base and, likely, resection of the bilateral lingual arteries.  I explained to him that such an operation could result in loss of the remaining tongue and, effectively, a total glossectomy defect.  He would also require resection of the neopharynx and the overlying cervical skin with extensive reconstruction.  He is not interested in pursuing such an operation and would prefer to pursue chemotherapy or immunotherapy.  This is reasonable choice.  I will arrange for him to be seen in Medical Oncology.  He is also having increasing difficulty swallowing.  I will arrange for him to be seen in surgery for placement of gastrostomy tube and port.           Relevant Orders    Ambulatory  referral/consult to Oncology    Ambulatory referral/consult to CLINIC Palliative Care    Ambulatory referral/consult to General Surgery

## 2022-06-07 NOTE — Clinical Note
Recurrent larynx cancer. Please have him see onc in [place] and Palliative.  He also needs to see surgery for G-tube and Port placement. Thanks.

## 2022-06-07 NOTE — Clinical Note
Anne Matos. I'm Mr. Batres's head and neck cancer surgeon.  He will soon be starting palliative chemo but wanted me to let you know that he's interested in the watchman device since he is off of Eliquis. Thanks.  Diego James

## 2022-06-08 ENCOUNTER — TELEPHONE (OUTPATIENT)
Dept: OTOLARYNGOLOGY | Facility: CLINIC | Age: 72
End: 2022-06-08
Payer: MEDICARE

## 2022-06-08 ENCOUNTER — TELEPHONE (OUTPATIENT)
Dept: SURGICAL ONCOLOGY | Facility: CLINIC | Age: 72
End: 2022-06-08
Payer: MEDICARE

## 2022-06-08 ENCOUNTER — PATIENT MESSAGE (OUTPATIENT)
Dept: SPEECH THERAPY | Facility: HOSPITAL | Age: 72
End: 2022-06-08
Payer: MEDICARE

## 2022-06-08 ENCOUNTER — PATIENT MESSAGE (OUTPATIENT)
Dept: GASTROENTEROLOGY | Facility: CLINIC | Age: 72
End: 2022-06-08
Payer: MEDICARE

## 2022-06-08 ENCOUNTER — HOSPITAL ENCOUNTER (INPATIENT)
Facility: HOSPITAL | Age: 72
LOS: 2 days | Discharge: HOME | DRG: 983 | End: 2022-06-10
Attending: EMERGENCY MEDICINE | Admitting: INTERNAL MEDICINE
Payer: MEDICARE

## 2022-06-08 DIAGNOSIS — R13.10 DYSPHAGIA, UNSPECIFIED TYPE: ICD-10-CM

## 2022-06-08 DIAGNOSIS — E86.0 DEHYDRATION: ICD-10-CM

## 2022-06-08 DIAGNOSIS — C01 SQUAMOUS CELL CARCINOMA OF BASE OF TONGUE: Primary | ICD-10-CM

## 2022-06-08 DIAGNOSIS — C32.9 LARYNGEAL SQUAMOUS CELL CARCINOMA: ICD-10-CM

## 2022-06-08 DIAGNOSIS — R13.14 DYSPHAGIA, PHARYNGOESOPHAGEAL: ICD-10-CM

## 2022-06-08 LAB
ALBUMIN SERPL BCP-MCNC: 4.2 G/DL (ref 3.5–5.2)
ALP SERPL-CCNC: 73 U/L (ref 55–135)
ALT SERPL W/O P-5'-P-CCNC: 11 U/L (ref 10–44)
ANION GAP SERPL CALC-SCNC: 9 MMOL/L (ref 8–16)
AST SERPL-CCNC: 16 U/L (ref 10–40)
BASOPHILS # BLD AUTO: 0.02 K/UL (ref 0–0.2)
BASOPHILS NFR BLD: 0.4 % (ref 0–1.9)
BILIRUB SERPL-MCNC: 1.2 MG/DL (ref 0.1–1)
BUN SERPL-MCNC: 22 MG/DL (ref 8–23)
CALCIUM SERPL-MCNC: 9.3 MG/DL (ref 8.7–10.5)
CHLORIDE SERPL-SCNC: 105 MMOL/L (ref 95–110)
CO2 SERPL-SCNC: 25 MMOL/L (ref 23–29)
CREAT SERPL-MCNC: 1 MG/DL (ref 0.5–1.4)
DIFFERENTIAL METHOD: ABNORMAL
EOSINOPHIL # BLD AUTO: 0.1 K/UL (ref 0–0.5)
EOSINOPHIL NFR BLD: 1.4 % (ref 0–8)
ERYTHROCYTE [DISTWIDTH] IN BLOOD BY AUTOMATED COUNT: 14.8 % (ref 11.5–14.5)
EST. GFR  (AFRICAN AMERICAN): >60 ML/MIN/1.73 M^2
EST. GFR  (NON AFRICAN AMERICAN): >60 ML/MIN/1.73 M^2
GLUCOSE SERPL-MCNC: 110 MG/DL (ref 70–110)
HCT VFR BLD AUTO: 27.3 % (ref 40–54)
HGB BLD-MCNC: 8.4 G/DL (ref 14–18)
IMM GRANULOCYTES # BLD AUTO: 0.02 K/UL (ref 0–0.04)
IMM GRANULOCYTES NFR BLD AUTO: 0.4 % (ref 0–0.5)
LYMPHOCYTES # BLD AUTO: 0.6 K/UL (ref 1–4.8)
LYMPHOCYTES NFR BLD: 11 % (ref 18–48)
MCH RBC QN AUTO: 24.7 PG (ref 27–31)
MCHC RBC AUTO-ENTMCNC: 30.8 G/DL (ref 32–36)
MCV RBC AUTO: 80 FL (ref 82–98)
MONOCYTES # BLD AUTO: 0.4 K/UL (ref 0.3–1)
MONOCYTES NFR BLD: 6.1 % (ref 4–15)
NEUTROPHILS # BLD AUTO: 4.6 K/UL (ref 1.8–7.7)
NEUTROPHILS NFR BLD: 80.7 % (ref 38–73)
NRBC BLD-RTO: 0 /100 WBC
PLATELET # BLD AUTO: 264 K/UL (ref 150–450)
PMV BLD AUTO: 10.8 FL (ref 9.2–12.9)
POTASSIUM SERPL-SCNC: 3.8 MMOL/L (ref 3.5–5.1)
PROT SERPL-MCNC: 7.3 G/DL (ref 6–8.4)
RBC # BLD AUTO: 3.4 M/UL (ref 4.6–6.2)
SARS-COV-2 RDRP RESP QL NAA+PROBE: NEGATIVE
SODIUM SERPL-SCNC: 139 MMOL/L (ref 136–145)
TROPONIN I SERPL DL<=0.01 NG/ML-MCNC: <0.03 NG/ML
TROPONIN I SERPL DL<=0.01 NG/ML-MCNC: <0.03 NG/ML
WBC # BLD AUTO: 5.71 K/UL (ref 3.9–12.7)

## 2022-06-08 PROCEDURE — 80053 COMPREHEN METABOLIC PANEL: CPT | Performed by: EMERGENCY MEDICINE

## 2022-06-08 PROCEDURE — 63600175 PHARM REV CODE 636 W HCPCS: Performed by: INTERNAL MEDICINE

## 2022-06-08 PROCEDURE — 99285 EMERGENCY DEPT VISIT HI MDM: CPT | Mod: 25

## 2022-06-08 PROCEDURE — 96374 THER/PROPH/DIAG INJ IV PUSH: CPT

## 2022-06-08 PROCEDURE — 84484 ASSAY OF TROPONIN QUANT: CPT | Performed by: EMERGENCY MEDICINE

## 2022-06-08 PROCEDURE — U0002 COVID-19 LAB TEST NON-CDC: HCPCS | Performed by: EMERGENCY MEDICINE

## 2022-06-08 PROCEDURE — 93010 ELECTROCARDIOGRAM REPORT: CPT | Mod: ,,, | Performed by: INTERNAL MEDICINE

## 2022-06-08 PROCEDURE — 12000002 HC ACUTE/MED SURGE SEMI-PRIVATE ROOM

## 2022-06-08 PROCEDURE — 85025 COMPLETE CBC W/AUTO DIFF WBC: CPT | Performed by: EMERGENCY MEDICINE

## 2022-06-08 PROCEDURE — C9113 INJ PANTOPRAZOLE SODIUM, VIA: HCPCS | Performed by: INTERNAL MEDICINE

## 2022-06-08 PROCEDURE — 93010 EKG 12-LEAD: ICD-10-PCS | Mod: ,,, | Performed by: INTERNAL MEDICINE

## 2022-06-08 PROCEDURE — 63600175 PHARM REV CODE 636 W HCPCS: Performed by: EMERGENCY MEDICINE

## 2022-06-08 PROCEDURE — 93005 ELECTROCARDIOGRAM TRACING: CPT | Performed by: INTERNAL MEDICINE

## 2022-06-08 PROCEDURE — 25000003 PHARM REV CODE 250: Performed by: INTERNAL MEDICINE

## 2022-06-08 RX ORDER — SODIUM CHLORIDE, SODIUM LACTATE, POTASSIUM CHLORIDE, CALCIUM CHLORIDE 600; 310; 30; 20 MG/100ML; MG/100ML; MG/100ML; MG/100ML
1000 INJECTION, SOLUTION INTRAVENOUS
Status: COMPLETED | OUTPATIENT
Start: 2022-06-08 | End: 2022-06-08

## 2022-06-08 RX ORDER — SODIUM CHLORIDE 0.9 % (FLUSH) 0.9 %
2 SYRINGE (ML) INJECTION EVERY 6 HOURS PRN
Status: DISCONTINUED | OUTPATIENT
Start: 2022-06-08 | End: 2022-06-10 | Stop reason: HOSPADM

## 2022-06-08 RX ORDER — ACETAMINOPHEN 325 MG/1
325 TABLET ORAL EVERY 6 HOURS PRN
COMMUNITY

## 2022-06-08 RX ORDER — PANTOPRAZOLE SODIUM 40 MG/10ML
40 INJECTION, POWDER, LYOPHILIZED, FOR SOLUTION INTRAVENOUS DAILY
Status: DISCONTINUED | OUTPATIENT
Start: 2022-06-08 | End: 2022-06-10 | Stop reason: HOSPADM

## 2022-06-08 RX ORDER — DIPHENHYDRAMINE HCL 25 MG
25 CAPSULE ORAL EVERY 6 HOURS PRN
COMMUNITY
End: 2022-11-02

## 2022-06-08 RX ORDER — SODIUM CHLORIDE 9 MG/ML
INJECTION, SOLUTION INTRAVENOUS CONTINUOUS
Status: DISCONTINUED | OUTPATIENT
Start: 2022-06-08 | End: 2022-06-10 | Stop reason: HOSPADM

## 2022-06-08 RX ADMIN — PANTOPRAZOLE SODIUM 40 MG: 40 INJECTION, POWDER, FOR SOLUTION INTRAVENOUS at 09:06

## 2022-06-08 RX ADMIN — SODIUM CHLORIDE, SODIUM LACTATE, POTASSIUM CHLORIDE, AND CALCIUM CHLORIDE 1000 ML: .6; .31; .03; .02 INJECTION, SOLUTION INTRAVENOUS at 06:06

## 2022-06-08 RX ADMIN — SODIUM CHLORIDE: 0.9 INJECTION, SOLUTION INTRAVENOUS at 09:06

## 2022-06-08 NOTE — ED PROVIDER NOTES
Encounter Date: 6/8/2022       History     Chief Complaint   Patient presents with    Dysphagia     Patient having difficulty swallowing, even water, mass at base of esophagus      71 y.o male followed by ENT Dr. Jesse Wells at [facility] [place] [phone].  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  Patient currently being scheduled to have Port-A-Cath placed as well as PEG tube placement which is being arranged through ENT at [facility].  Patient presents to the emergency department today however with inability to swallow and solid dysphagia secondary to mass at the base of the tongue and esophagus.  Patient is currently to be scheduled for chemotherapy.  Patient presents to [facility] in hopes of having seizures completed here.  Goal is to remain close to home in family while getting his port and PEG tube placed.  Denies fever, no active bleeding from the mass site in the larynx, no stridor.        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     Past Surgical History:   Procedure Laterality Date    DIRECT LARYNGOBRONCHOSCOPY N/A 12/27/2021    Procedure: LARYNGOSCOPY, DIRECT, WITH BRONCHOSCOPY;  Surgeon: Flex Espinosa MD;  Location: [address];  Service: ENT;  Laterality: N/A;    DISSECTION OF NECK Bilateral 1/6/2022    Procedure: DISSECTION, NECK;  Surgeon: Jesse James MD;  Location: [address];  Service: ENT;  Laterality: Bilateral;    FLAP PROCEDURE Right 1/6/2022    Procedure: CREATION, FREE FLAP;  Surgeon: Alise Hart MD;  Location: [facility] OR  2ND FLR;  Service: ENT;  Laterality: Right;  Ischemic start 1351  Ischemic stop 1502    LARYNGECTOMY N/A 1/6/2022    Procedure: LARYNGECTOMY;  Surgeon: Jesse James MD;  Location: [facility] OR 2ND FLR;  Service: ENT;  Laterality: N/A;    LARYNGOSCOPY N/A 8/4/2020    Procedure: Suspension microlaryngoscopy with biopsy, possible KTP laser treatment/excision;  Surgeon: Stew Noel MD;  Location: [facility] OR 2ND FLR;  Service: ENT;  Laterality: N/A;  Microscope, telescopes, tower, microinstruments, KTP laser, rep conf# [id number] IC 7/28.    LARYNGOSCOPY N/A 3/16/2021    Procedure: Suspension microlaryngoscopy with excision of lesion, possible CO2 laser;  Surgeon: Stew Noel MD;  Location: [facility] OR 2ND FLR;  Service: ENT;  Laterality: N/A;  Microscope, telescopes, tower, microinstruments, CO2 laser, rep conf# [id number] IC 3/4.    LARYNGOSCOPY N/A 4/1/2021    Procedure: Suspension microlaryngoscopy with KTP laser excision of lesion;  Surgeon: Stew oNel MD;  Location: [facility] OR 2ND FLR;  Service: ENT;  Laterality: N/A;  Microscope, telescopes, tower, microinstruments, 70 degree scope, vocal fold , KTP laser, rep conf# [id number] BC    LARYNGOSCOPY N/A 12/9/2021    Procedure: Suspension microlaryngoscopy with biopsy;  Surgeon: Stew Noel MD;  Location: [facility] OR 2ND FLR;  Service: ENT;  Laterality: N/A;  Microscope, telescopes, tower, microinstruments    LARYNGOSCOPY N/A 1/6/2022    Procedure: LARYNGOSCOPY;  Surgeon: Jesse James MD;  Location: [facility] OR 2ND FLR;  Service: ENT;  Laterality: N/A;    LARYNGOSCOPY N/A 4/27/2022    Procedure: LARYNGOSCOPY WITH BIOPSY;  Surgeon: Jesse James MD;  Location: [facility] OR 2ND FLR;  Service: ENT;  Laterality: N/A;    MICROLARYNGOSCOPY N/A 3/17/2020    Procedure: MICROLARYNGOSCOPY;  Surgeon: Jung Xiao MD;  Location: [facility];  Service: ENT;  Laterality: N/A;  Laser Microlaryngoscopy  NEED TO SCHEDULE LASER from [facility]  [id number]    REIMPLANTATION OF PARATHYROID TISSUE N/A 1/6/2022    Procedure: REIMPLANTATION, PARATHYROID TISSUE;  Surgeon: Jesse James MD;  Location: [facility] OR [address] [place] [address];  Service: ENT;  Laterality: N/A;    THYROIDECTOMY  1/6/2022    Procedure: THYROIDECTOMY;  Surgeon: Jesse James MD;  Location: [facility] OR [address] [place] [address];  Service: ENT;;    TRACHEOSTOMY N/A 12/27/2021    Procedure: CREATION, TRACHEOSTOMY;  Surgeon: Flex Espinosa MD;  Location: [facility] OR [address] [place] [address];  Service: ENT;  Laterality: N/A;     Family History   Problem Relation Age of Onset    Abnormal EKG Mother     Diabetes Father     Heart disease Father     Hypertension Father      Social History     Tobacco Use    Smoking status: Never Smoker    Smokeless tobacco: Never Used   Substance Use Topics    Alcohol use: Not Currently     Comment: occasional    Drug use: No     Review of Systems   Constitutional: Positive for fatigue. Negative for fever.   HENT: Positive for sore throat.    Respiratory: Negative for shortness of breath.    Cardiovascular: Negative for chest pain.   Gastrointestinal: Negative for nausea and vomiting.   Genitourinary: Negative for dysuria.   Musculoskeletal: Negative for back pain.   Skin: Negative for rash.   Neurological: Negative for weakness.   Hematological: Does not bruise/bleed easily.       Physical Exam     Initial Vitals [06/08/22 1753]   BP Pulse Resp Temp SpO2   137/82 77 18 98.9 °F (37.2 °C) 100 %      MAP       --         Physical Exam    Nursing note and vitals reviewed.  Constitutional: He appears well-developed and well-nourished.   Frail appearing male, cachectic appearing.   HENT:   Head: Normocephalic and atraumatic.   Nose: Nose normal.   Mouth/Throat: Oropharynx is clear and moist.   Eyes: Conjunctivae and EOM are normal. Pupils are equal, round, and reactive to light. No scleral icterus.   Neck: Neck supple.   Normal range of motion.  Cardiovascular: Normal rate, regular rhythm, normal heart  sounds and intact distal pulses. Exam reveals no gallop and no friction rub.    No murmur heard.  Pulmonary/Chest: No stridor. No respiratory distress.   Course bilateral breath sounds no adventitious sounds   Abdominal: Abdomen is soft. Bowel sounds are normal. He exhibits no mass. There is no abdominal tenderness. There is no rebound and no guarding.   Musculoskeletal:         General: No edema. Normal range of motion.      Cervical back: Normal range of motion and neck supple.     Lymphadenopathy:     He has no cervical adenopathy.   Neurological: He is alert and oriented to person, place, and time. He has normal strength and normal reflexes. No cranial nerve deficit or sensory deficit. GCS score is 15. GCS eye subscore is 4. GCS verbal subscore is 5. GCS motor subscore is 6.   Skin: Skin is warm and dry. Capillary refill takes less than 2 seconds. No rash noted.   Psychiatric: He has a normal mood and affect. His behavior is normal. Judgment and thought content normal.         ED Course   Procedures  Labs Reviewed   CBC W/ AUTO DIFFERENTIAL - Abnormal; Notable for the following components:       Result Value    RBC 3.40 (*)     Hemoglobin 8.4 (*)     Hematocrit 27.3 (*)     MCV 80 (*)     MCH 24.7 (*)     MCHC 30.8 (*)     RDW 14.8 (*)     Lymph # 0.6 (*)     Gran % 80.7 (*)     Lymph % 11.0 (*)     All other components within normal limits   COMPREHENSIVE METABOLIC PANEL - Abnormal; Notable for the following components:    Total Bilirubin 1.2 (*)     All other components within normal limits   TROPONIN I   SARS-COV-2 RNA AMPLIFICATION, QUAL   URINALYSIS, REFLEX TO URINE CULTURE   URINALYSIS, REFLEX TO URINE CULTURE   POCT GLUCOSE MONITORING CONTINUOUS          Imaging Results          X-Ray Chest AP Portable (Final result)  Result time 06/08/22 19:35:38    Final result by Valente Parra MD (06/08/22 19:35:38)                 Narrative:    HISTORY: Chest Pain  head and neck carcinoma.    FINDINGS: Portable  chest radiograph at 1843 hours compared to prior exams shows the cardiomediastinal silhouette and pulmonary vasculature are within normal limits.    The lungs are normally expanded, with no consolidation, large pleural effusion, or evidence of pulmonary edema. No confluent infiltrates or pneumothorax. No acute fractures or destructive osseous lesions. Surgical clips overlie the neck and supraclavicular soft tissues.    IMPRESSION: No evidence of acute cardiopulmonary disease.    Electronically signed by:  Valente Parra MD  6/8/2022 7:35 PM CDT Workstation: [id number]                               Medications   sodium chloride 0.9% flush 2 mL (has no administration in time range)   0.9%  NaCl infusion ( Intravenous New Bag 6/8/22 2158)   pantoprazole injection 40 mg (40 mg Intravenous Given 6/8/22 2100)   lactated ringers infusion (1,000 mLs Intravenous New Bag 6/8/22 1845)                          Clinical Impression:   Final diagnoses:  [C01] Squamous cell carcinoma of base of tongue (Primary)  [C32.9] Laryngeal squamous cell carcinoma  [R13.10] Dysphagia, unspecified type  [E86.0] Dehydration          ED Disposition Condition    Admit               Nicolás Gant MD  06/08/22 4149

## 2022-06-08 NOTE — TELEPHONE ENCOUNTER
----- Message from Haily Go sent at 6/8/2022  3:19 PM CDT -----   Name of Who is Calling:     What is the request in detail: request call back in reference to need to be admitted / want to know if should admit at [facility] or [facility]   Please contact to further discuss and advise      Can the clinic reply by [facility]:     What Number to Call Back if not [facility]:   dr tabares -[facility] / [phone]

## 2022-06-09 ENCOUNTER — PATIENT MESSAGE (OUTPATIENT)
Dept: HEMATOLOGY/ONCOLOGY | Facility: CLINIC | Age: 72
End: 2022-06-09
Payer: MEDICARE

## 2022-06-09 ENCOUNTER — ANESTHESIA (OUTPATIENT)
Dept: SURGERY | Facility: HOSPITAL | Age: 72
DRG: 983 | End: 2022-06-09
Payer: MEDICARE

## 2022-06-09 ENCOUNTER — ANESTHESIA EVENT (OUTPATIENT)
Dept: SURGERY | Facility: HOSPITAL | Age: 72
DRG: 983 | End: 2022-06-09
Payer: MEDICARE

## 2022-06-09 DIAGNOSIS — C32.9 LARYNX CANCER: ICD-10-CM

## 2022-06-09 DIAGNOSIS — R63.4 WEIGHT LOSS, UNINTENTIONAL: Primary | ICD-10-CM

## 2022-06-09 DIAGNOSIS — E11.65 TYPE 2 DIABETES MELLITUS WITH HYPERGLYCEMIA, WITHOUT LONG-TERM CURRENT USE OF INSULIN: ICD-10-CM

## 2022-06-09 DIAGNOSIS — R13.14 DYSPHAGIA, PHARYNGOESOPHAGEAL: ICD-10-CM

## 2022-06-09 LAB
ANION GAP SERPL CALC-SCNC: 9 MMOL/L (ref 8–16)
BASOPHILS # BLD AUTO: 0.01 K/UL (ref 0–0.2)
BASOPHILS NFR BLD: 0.2 % (ref 0–1.9)
BUN SERPL-MCNC: 20 MG/DL (ref 8–23)
CALCIUM SERPL-MCNC: 8.8 MG/DL (ref 8.7–10.5)
CHLORIDE SERPL-SCNC: 105 MMOL/L (ref 95–110)
CO2 SERPL-SCNC: 22 MMOL/L (ref 23–29)
CREAT SERPL-MCNC: 0.9 MG/DL (ref 0.5–1.4)
DIFFERENTIAL METHOD: ABNORMAL
EOSINOPHIL # BLD AUTO: 0.2 K/UL (ref 0–0.5)
EOSINOPHIL NFR BLD: 2.6 % (ref 0–8)
ERYTHROCYTE [DISTWIDTH] IN BLOOD BY AUTOMATED COUNT: 14.5 % (ref 11.5–14.5)
EST. GFR  (AFRICAN AMERICAN): >60 ML/MIN/1.73 M^2
EST. GFR  (NON AFRICAN AMERICAN): >60 ML/MIN/1.73 M^2
FERRITIN SERPL-MCNC: 8 NG/ML (ref 20–300)
GLUCOSE SERPL-MCNC: 77 MG/DL (ref 70–110)
GLUCOSE SERPL-MCNC: 84 MG/DL (ref 70–110)
GLUCOSE SERPL-MCNC: 85 MG/DL (ref 70–110)
GLUCOSE SERPL-MCNC: 90 MG/DL (ref 70–110)
HCT VFR BLD AUTO: 27.1 % (ref 40–54)
HGB BLD-MCNC: 8.3 G/DL (ref 14–18)
IMM GRANULOCYTES # BLD AUTO: 0.02 K/UL (ref 0–0.04)
IMM GRANULOCYTES NFR BLD AUTO: 0.3 % (ref 0–0.5)
IRON SERPL-MCNC: 23 UG/DL (ref 45–160)
LYMPHOCYTES # BLD AUTO: 0.7 K/UL (ref 1–4.8)
LYMPHOCYTES NFR BLD: 11.3 % (ref 18–48)
MCH RBC QN AUTO: 24.9 PG (ref 27–31)
MCHC RBC AUTO-ENTMCNC: 30.6 G/DL (ref 32–36)
MCV RBC AUTO: 81 FL (ref 82–98)
MONOCYTES # BLD AUTO: 0.4 K/UL (ref 0.3–1)
MONOCYTES NFR BLD: 6.3 % (ref 4–15)
NEUTROPHILS # BLD AUTO: 4.5 K/UL (ref 1.8–7.7)
NEUTROPHILS NFR BLD: 79.3 % (ref 38–73)
NRBC BLD-RTO: 0 /100 WBC
PLATELET # BLD AUTO: 256 K/UL (ref 150–450)
PMV BLD AUTO: 10.3 FL (ref 9.2–12.9)
POTASSIUM SERPL-SCNC: 3.3 MMOL/L (ref 3.5–5.1)
RBC # BLD AUTO: 3.34 M/UL (ref 4.6–6.2)
SATURATED IRON: 6 % (ref 20–50)
SODIUM SERPL-SCNC: 136 MMOL/L (ref 136–145)
TOTAL IRON BINDING CAPACITY: 379 UG/DL (ref 250–450)
TRANSFERRIN SERPL-MCNC: 271 MG/DL (ref 200–375)
WBC # BLD AUTO: 5.73 K/UL (ref 3.9–12.7)

## 2022-06-09 PROCEDURE — 25000003 PHARM REV CODE 250: Performed by: SURGERY

## 2022-06-09 PROCEDURE — 85025 COMPLETE CBC W/AUTO DIFF WBC: CPT | Performed by: INTERNAL MEDICINE

## 2022-06-09 PROCEDURE — 63600175 PHARM REV CODE 636 W HCPCS: Performed by: SURGERY

## 2022-06-09 PROCEDURE — C9113 INJ PANTOPRAZOLE SODIUM, VIA: HCPCS | Performed by: INTERNAL MEDICINE

## 2022-06-09 PROCEDURE — 37000009 HC ANESTHESIA EA ADD 15 MINS: Performed by: SURGERY

## 2022-06-09 PROCEDURE — 25000003 PHARM REV CODE 250: Performed by: NURSE ANESTHETIST, CERTIFIED REGISTERED

## 2022-06-09 PROCEDURE — 36415 COLL VENOUS BLD VENIPUNCTURE: CPT | Performed by: INTERNAL MEDICINE

## 2022-06-09 PROCEDURE — 63600175 PHARM REV CODE 636 W HCPCS: Performed by: INTERNAL MEDICINE

## 2022-06-09 PROCEDURE — 99900031 HC PATIENT EDUCATION (STAT)

## 2022-06-09 PROCEDURE — 27201423 OPTIME MED/SURG SUP & DEVICES STERILE SUPPLY: Performed by: SURGERY

## 2022-06-09 PROCEDURE — 82728 ASSAY OF FERRITIN: CPT | Performed by: INTERNAL MEDICINE

## 2022-06-09 PROCEDURE — 82962 GLUCOSE BLOOD TEST: CPT | Performed by: SURGERY

## 2022-06-09 PROCEDURE — 84466 ASSAY OF TRANSFERRIN: CPT | Performed by: INTERNAL MEDICINE

## 2022-06-09 PROCEDURE — 37000008 HC ANESTHESIA 1ST 15 MINUTES: Performed by: SURGERY

## 2022-06-09 PROCEDURE — C1788 PORT, INDWELLING, IMP: HCPCS | Performed by: SURGERY

## 2022-06-09 PROCEDURE — 71000033 HC RECOVERY, INTIAL HOUR: Performed by: SURGERY

## 2022-06-09 PROCEDURE — 99900035 HC TECH TIME PER 15 MIN (STAT)

## 2022-06-09 PROCEDURE — 80048 BASIC METABOLIC PNL TOTAL CA: CPT | Performed by: INTERNAL MEDICINE

## 2022-06-09 PROCEDURE — 36000707: Performed by: SURGERY

## 2022-06-09 PROCEDURE — 43246 EGD PLACE GASTROSTOMY TUBE: CPT | Mod: 51,,, | Performed by: SURGERY

## 2022-06-09 PROCEDURE — 94761 N-INVAS EAR/PLS OXIMETRY MLT: CPT

## 2022-06-09 PROCEDURE — 63600175 PHARM REV CODE 636 W HCPCS: Performed by: NURSE ANESTHETIST, CERTIFIED REGISTERED

## 2022-06-09 PROCEDURE — 36561 PR INSERT TUNNELED CV CATH WITH PORT: ICD-10-PCS | Mod: LT,,, | Performed by: SURGERY

## 2022-06-09 PROCEDURE — 36000706: Performed by: SURGERY

## 2022-06-09 PROCEDURE — 36561 INSERT TUNNELED CV CATH: CPT | Mod: LT,,, | Performed by: SURGERY

## 2022-06-09 PROCEDURE — 12000002 HC ACUTE/MED SURGE SEMI-PRIVATE ROOM

## 2022-06-09 PROCEDURE — 43246 PR EGD, FLEX, W/PLCMT, GASTROSTOMY TUBE: ICD-10-PCS | Mod: 51,,, | Performed by: SURGERY

## 2022-06-09 DEVICE — PORT POWER MRI 8FR 1808000: Type: IMPLANTABLE DEVICE | Site: CHEST  WALL | Status: FUNCTIONAL

## 2022-06-09 RX ORDER — LIDOCAINE HYDROCHLORIDE 20 MG/ML
JELLY TOPICAL
Status: DISCONTINUED | OUTPATIENT
Start: 2022-06-09 | End: 2022-06-09

## 2022-06-09 RX ORDER — ACETAMINOPHEN 10 MG/ML
INJECTION, SOLUTION INTRAVENOUS
Status: DISCONTINUED | OUTPATIENT
Start: 2022-06-09 | End: 2022-06-09

## 2022-06-09 RX ORDER — MIDAZOLAM HYDROCHLORIDE 1 MG/ML
INJECTION INTRAMUSCULAR; INTRAVENOUS
Status: DISCONTINUED | OUTPATIENT
Start: 2022-06-09 | End: 2022-06-09

## 2022-06-09 RX ORDER — HYDROMORPHONE HYDROCHLORIDE 1 MG/ML
0.2 INJECTION, SOLUTION INTRAMUSCULAR; INTRAVENOUS; SUBCUTANEOUS EVERY 5 MIN PRN
Status: DISCONTINUED | OUTPATIENT
Start: 2022-06-09 | End: 2022-06-09 | Stop reason: HOSPADM

## 2022-06-09 RX ORDER — LIDOCAINE HCL/PF 100 MG/5ML
SYRINGE (ML) INTRAVENOUS
Status: DISCONTINUED | OUTPATIENT
Start: 2022-06-09 | End: 2022-06-09

## 2022-06-09 RX ORDER — PROPOFOL 10 MG/ML
VIAL (ML) INTRAVENOUS
Status: DISCONTINUED | OUTPATIENT
Start: 2022-06-09 | End: 2022-06-09

## 2022-06-09 RX ORDER — ROCURONIUM BROMIDE 10 MG/ML
INJECTION, SOLUTION INTRAVENOUS
Status: DISCONTINUED | OUTPATIENT
Start: 2022-06-09 | End: 2022-06-09

## 2022-06-09 RX ORDER — HEPARIN SODIUM 1000 [USP'U]/ML
INJECTION, SOLUTION INTRAVENOUS; SUBCUTANEOUS
Status: DISCONTINUED | OUTPATIENT
Start: 2022-06-09 | End: 2022-06-09 | Stop reason: HOSPADM

## 2022-06-09 RX ORDER — FAMOTIDINE 10 MG/ML
INJECTION INTRAVENOUS
Status: DISCONTINUED | OUTPATIENT
Start: 2022-06-09 | End: 2022-06-09

## 2022-06-09 RX ORDER — BUPIVACAINE HYDROCHLORIDE AND EPINEPHRINE 2.5; 5 MG/ML; UG/ML
INJECTION, SOLUTION EPIDURAL; INFILTRATION; INTRACAUDAL; PERINEURAL
Status: DISCONTINUED | OUTPATIENT
Start: 2022-06-09 | End: 2022-06-09 | Stop reason: HOSPADM

## 2022-06-09 RX ORDER — MEPERIDINE HYDROCHLORIDE 50 MG/ML
12.5 INJECTION INTRAMUSCULAR; INTRAVENOUS; SUBCUTANEOUS EVERY 10 MIN PRN
Status: DISCONTINUED | OUTPATIENT
Start: 2022-06-09 | End: 2022-06-09 | Stop reason: HOSPADM

## 2022-06-09 RX ORDER — POTASSIUM CHLORIDE 14.9 MG/ML
INJECTION INTRAVENOUS CONTINUOUS PRN
Status: DISCONTINUED | OUTPATIENT
Start: 2022-06-09 | End: 2022-06-09

## 2022-06-09 RX ORDER — MORPHINE SULFATE 2 MG/ML
2 INJECTION, SOLUTION INTRAMUSCULAR; INTRAVENOUS EVERY 4 HOURS PRN
Status: DISCONTINUED | OUTPATIENT
Start: 2022-06-09 | End: 2022-06-10 | Stop reason: HOSPADM

## 2022-06-09 RX ORDER — ONDANSETRON 2 MG/ML
INJECTION INTRAMUSCULAR; INTRAVENOUS
Status: DISCONTINUED | OUTPATIENT
Start: 2022-06-09 | End: 2022-06-09

## 2022-06-09 RX ORDER — SODIUM CHLORIDE 0.9 % (FLUSH) 0.9 %
10 SYRINGE (ML) INJECTION
Status: DISCONTINUED | OUTPATIENT
Start: 2022-06-09 | End: 2022-06-09 | Stop reason: HOSPADM

## 2022-06-09 RX ORDER — ONDANSETRON 2 MG/ML
4 INJECTION INTRAMUSCULAR; INTRAVENOUS DAILY PRN
Status: DISCONTINUED | OUTPATIENT
Start: 2022-06-09 | End: 2022-06-09 | Stop reason: HOSPADM

## 2022-06-09 RX ORDER — FENTANYL CITRATE 50 UG/ML
INJECTION, SOLUTION INTRAMUSCULAR; INTRAVENOUS
Status: DISCONTINUED | OUTPATIENT
Start: 2022-06-09 | End: 2022-06-09

## 2022-06-09 RX ORDER — SODIUM CHLORIDE, SODIUM LACTATE, POTASSIUM CHLORIDE, CALCIUM CHLORIDE 600; 310; 30; 20 MG/100ML; MG/100ML; MG/100ML; MG/100ML
INJECTION, SOLUTION INTRAVENOUS CONTINUOUS PRN
Status: DISCONTINUED | OUTPATIENT
Start: 2022-06-09 | End: 2022-06-09

## 2022-06-09 RX ORDER — DIPHENHYDRAMINE HYDROCHLORIDE 50 MG/ML
12.5 INJECTION INTRAMUSCULAR; INTRAVENOUS
Status: DISCONTINUED | OUTPATIENT
Start: 2022-06-09 | End: 2022-06-09 | Stop reason: HOSPADM

## 2022-06-09 RX ORDER — CEFAZOLIN SODIUM 1 G/3ML
INJECTION, POWDER, FOR SOLUTION INTRAMUSCULAR; INTRAVENOUS
Status: DISCONTINUED | OUTPATIENT
Start: 2022-06-09 | End: 2022-06-09

## 2022-06-09 RX ADMIN — LIDOCAINE HYDROCHLORIDE 75 MG: 20 INJECTION, SOLUTION INTRAVENOUS at 12:06

## 2022-06-09 RX ADMIN — CEFAZOLIN 2 G: 330 INJECTION, POWDER, FOR SOLUTION INTRAMUSCULAR; INTRAVENOUS at 12:06

## 2022-06-09 RX ADMIN — ACETAMINOPHEN 1000 MG: 10 INJECTION, SOLUTION INTRAVENOUS at 12:06

## 2022-06-09 RX ADMIN — MORPHINE SULFATE 2 MG: 2 INJECTION, SOLUTION INTRAMUSCULAR; INTRAVENOUS at 09:06

## 2022-06-09 RX ADMIN — POTASSIUM CHLORIDE: 14.9 INJECTION, SOLUTION INTRAVENOUS at 12:06

## 2022-06-09 RX ADMIN — ONDANSETRON 4 MG: 2 INJECTION INTRAMUSCULAR; INTRAVENOUS at 12:06

## 2022-06-09 RX ADMIN — PROPOFOL 130 MG: 10 INJECTION, EMULSION INTRAVENOUS at 12:06

## 2022-06-09 RX ADMIN — MORPHINE SULFATE 2 MG: 2 INJECTION, SOLUTION INTRAMUSCULAR; INTRAVENOUS at 10:06

## 2022-06-09 RX ADMIN — LIDOCAINE HYDROCHLORIDE 3 ML: 20 JELLY TOPICAL at 12:06

## 2022-06-09 RX ADMIN — PANTOPRAZOLE SODIUM 40 MG: 40 INJECTION, POWDER, FOR SOLUTION INTRAVENOUS at 09:06

## 2022-06-09 RX ADMIN — SODIUM CHLORIDE, SODIUM LACTATE, POTASSIUM CHLORIDE, AND CALCIUM CHLORIDE: .6; .31; .03; .02 INJECTION, SOLUTION INTRAVENOUS at 12:06

## 2022-06-09 RX ADMIN — FAMOTIDINE 20 MG: 10 INJECTION, SOLUTION INTRAVENOUS at 12:06

## 2022-06-09 RX ADMIN — FENTANYL CITRATE 50 MCG: 50 INJECTION INTRAMUSCULAR; INTRAVENOUS at 12:06

## 2022-06-09 RX ADMIN — ROCURONIUM BROMIDE 10 MG: 10 INJECTION, SOLUTION INTRAVENOUS at 12:06

## 2022-06-09 RX ADMIN — MORPHINE SULFATE 2 MG: 2 INJECTION, SOLUTION INTRAMUSCULAR; INTRAVENOUS at 05:06

## 2022-06-09 NOTE — OP NOTE
Procedure Note  1. Percutaneous gastrostomy tube  2. Left subclavian port a cath    Date of procedure:   06/09/2022    Indications:  71-year-old with recurrent laryngeal cancer the base of tongue.  He is unable to even tolerate liquids.  He is here for gastrostomy and Port-A-Cath.    Pre-operative Diagnosis:  Squamous cell laryngeal cancer recurrent    Post-operative Diagnosis: Same    Surgeon: Jesus Viera MD    Assistants: none    Anesthesia: General endotracheal anesthesia    ASA Class: 4    Procedure Details   The patient was seen in the Holding Room. The risks, benefits, complications, treatment options, and expected outcomes were discussed with the patient. The possibilities of reaction to medication, pulmonary aspiration, perforation of viscus, bleeding, recurrent infection, the need for additional procedures, failure to diagnose a condition, and creating a complication requiring transfusion or operation were discussed with the patient. The patient concurred with the proposed plan, giving informed consent. The site of surgery properly noted/marked. The patient was taken to Operating Room 7 identified as Cyrus Batres Jr. and the procedure verified as peg and port. A Time Out was held and the above information confirmed.    Full general anesthesia was induced with orotracheal intubation. The patient was prepped and draped in a supine position. Appropriate antibiotics were given intravenously. Arms were tucked, bump was placed.    Local anesthetic was used to anesthetize the skin above the left subclavian area.  The needle was inserted into the left subclavian vein on the 1st try.  A wire was then inserted through the needle and confirmed to be in appropriate placement with the x-ray.  Once this was done incision was made over the wire to allow the Port-A-Cath a sit appropriately.  Using blunt dissection the cautery the pocket was extended after the area was anesthetized further with local anesthetic.   Over the wire a break we sheath was placed.  The wire and guide were removed.  A port was measured to approximately 20 cm and placed through the breakaway sheath and the sheath was removed.  This was appropriate placement confirmed by x-ray.  Port was then sutured to the pectoral fascia and the subcutaneous tissues were closed along with the skin using Vicryl then Monocryl.  The port was accessed using a Lester needle and blood was withdrawn easily.  It was then flushed with heparinized saline.  Dressings were placed.    Attention was then focused on placing a percutaneous gastrostomy tube.  Using an endoscope I was unable to pass an area of the upper esophagus slightly distal to the oropharynx.  This appeared to have a fungating mass base the wound.  Using a pediatric endoscope I was able to get past this with some manipulation scope without damaging any surrounding organs.  The stomach was then insufflated and an area was selected where he had a previous gastrostomy tube.  We were able to transilluminate and did confirm one-to-one palpation.  A nick incision was created over the previous gastrostomy site and angio catheter inserted over needle.  A wire was then grasped with the gastroscope and pulled through the mouth.  The PEG was then placed on the wire and pulled back through the previous gastrostomy tube site.  This was then secured with a bump and dressings.  Endoscopy was performed again and ensured no bleeding or abnormalities were seen at the mass or any other abnormalities within the stomach.  I did confirm placement of the PEG within the stomach using the endoscope.  Patient was then wakened from anesthesia tolerated procedure well.      Instrument, sponge, and needle counts were correct prior to wound closure and at the conclusion of the case.     Findings:  Fungating mass base of tongue; esophageal narrowing    Estimated Blood Loss: 10.0 cc    Drains: peg    Total IV Fluids: see anesthesia    Specimens:  none    Implants: none    Complications:  None; patient tolerated the procedure well.    Disposition: PACU - hemodynamically stable.    Condition: stable    Attending Attestation: I was present and scrubbed for the entire procedure.

## 2022-06-09 NOTE — PLAN OF CARE
Pt compliant with POC . Peg and port placed. RR even and unlabored. No signs of distress noted. Will continue to monitor.     Problem: Adult Inpatient Plan of Care  Goal: Plan of Care Review  Outcome: Ongoing, Progressing  Goal: Patient-Specific Goal (Individualized)  Outcome: Ongoing, Progressing  Goal: Absence of Hospital-Acquired Illness or Injury  Outcome: Ongoing, Progressing  Goal: Optimal Comfort and Wellbeing  Outcome: Ongoing, Progressing  Goal: Readiness for Transition of Care  Outcome: Ongoing, Progressing     Problem: Diabetes Comorbidity  Goal: Blood Glucose Level Within Targeted Range  Outcome: Ongoing, Progressing     Problem: Fall Injury Risk  Goal: Absence of Fall and Fall-Related Injury  Outcome: Ongoing, Progressing

## 2022-06-09 NOTE — ANESTHESIA PROCEDURE NOTES
Intubation    Date/Time: 6/9/2022 12:29 PM  Performed by: Brown Jaeger CRNA  Authorized by: Hiram Johnson MD     Intubation:     Induction:  Intravenous    Intubated:  Postinduction    Mask Ventilation:  N/a    Attempts:  1    Attempted By:  CRNA    Method of Intubation:  Blind intubation    Difficult Airway Encountered?: No      Complications:  None    Airway Device:  Oral endotracheal tube (Placed to tracheostomy)    Airway Device Size:  7.5    Style/Cuff Inflation:  Cuffed    Tube secured:  9    Secured at:  The skin level of trach    Placement Verified By:  Capnometry    Complicating Factors:  None    Findings Post-Intubation:  BS equal bilateral and atraumatic/condition of teeth unchanged     with patient

## 2022-06-09 NOTE — PROGRESS NOTES
[facility] Medicine  Progress Note    Patient name: Cyrus Batres Jr.  MRN: [id number]  Admit Date: 6/8/2022   LOS: 1 day     SUBJECTIVE:     Principal problem: Dysphagia, pharyngoesophageal    Interval History:  Admitted for dysphagia in the setting of recurrent laryngeal cancer.  Unable to tolerate liquids.  Seen in the morning prior to PEG tube and Port-A-Cath placement.  He endorses moderate pain rating it 6/10 at the neck.    Scheduled Meds:   pantoprazole  40 mg Intravenous Daily     Continuous Infusions:   sodium chloride 0.9% 65 mL/hr at 06/08/22 2158     PRN Meds:morphine, sodium chloride 0.9%    Review of patient's allergies indicates:   Allergen Reactions    Pollen extracts     Lovastatin Rash     Not confirmed but pt skeptical       Review of Systems: As per interval history    OBJECTIVE:     Vital Signs (Most Recent)  Temp: 97.5 °F (36.4 °C) (06/09/22 1415)  Pulse: 64 (06/09/22 1430)  Resp: 18 (06/09/22 1430)  BP: (!) 140/76 (06/09/22 1430)  SpO2: 100 % (06/09/22 1430)    Vital Signs Range (Last 24H):  Temp:  [97 °F (36.1 °C)-98.9 °F (37.2 °C)]   Pulse:  [60-87]   Resp:  [10-18]   BP: (124-151)/(67-82)   SpO2:  [98 %-100 %]     I & O (Last 24H):    Intake/Output Summary (Last 24 hours) at 6/9/2022 1639  Last data filed at 6/9/2022 1327  Gross per 24 hour   Intake 600 ml   Output 20 ml   Net 580 ml       Physical Exam:  General: Patient resting comfortably in no acute distress. Appears as stated age. Calm  Eyes: No conjunctival injection. No scleral icterus.  ENT: Hearing grossly intact. No discharge from ears. No nasal discharge.   Neck:  Midline skin graft/flap noted. Surrounding fibrosis present  CVS: RRR. No LE edema BL  Lungs:  No tachypnea or accessory muscle use.  Clear to auscultation bilaterally  Abdomen:  Soft, nontender and nondistended.  No organomegaly  Neuro: AOx3. Moves all extremities. Follows commands. Responds appropriately   Skin:  No rash or erythema  noted    Laboratory:  I have reviewed all pertinent lab results within the past 24 hours.  CBC:   Recent Labs   Lab 06/09/22  0709   WBC 5.73   RBC 3.34*   HGB 8.3*   HCT 27.1*      MCV 81*   MCH 24.9*   MCHC 30.6*     CMP:   Recent Labs   Lab 06/08/22  1825 06/09/22  0709    85   CALCIUM 9.3 8.8   ALBUMIN 4.2  --    PROT 7.3  --     136   K 3.8 3.3*   CO2 25 22*    105   BUN 22 20   CREATININE 1.0 0.9   ALKPHOS 73  --    ALT 11  --    AST 16  --    BILITOT 1.2*  --        Diagnostic Results:  Labs: Reviewed    ASSESSMENT/PLAN:     Active Hospital Problems    Diagnosis  POA    *Dysphagia, pharyngoesophageal [R13.14]  Yes    Larynx cancer [C32.9]  Yes    Paroxysmal atrial fibrillation [I48.0]  Yes     Chronic     Patient was recently hospitalized with bilateral pneumonia at [facility]. He was treated accordingly with antibiotics and breathing treatment. He was also found to have atrial fibrillation and was started on Eliquis.      Benign hypertension [I10]  Yes     Chronic     Patient now informs me that he is taking losartan. Not mentioned in his medication list. This is to protect his kidneys.        Resolved Hospital Problems   No resolved problems to display.         Plan:   Patient has history of laryngeal cancer status post surgery and radiation  No further recurrent disease involving base of tongue and neopharynx  Seen by General surgery here and underwent gastrostomy as well as Port-A-Cath placement Monitor overnight   Seen by dietitian recommendations noted for tube feeds  Follow up with Dr. James from ENT for recurrent disease      VTE Risk Mitigation (From admission, onward)         Ordered     IP VTE HIGH RISK PATIENT  Once         06/08/22 2046     Place sequential compression device  Until discontinued         06/08/22 2046                    Department Hospital Medicine  [facility]  Jorge Yee MD  Date of service: 06/09/2022

## 2022-06-09 NOTE — ANESTHESIA PREPROCEDURE EVALUATION
06/09/2022  Cyrus Batres Jr. is a 71 y.o., male.    Patient Active Problem List   Diagnosis    Melanocytic nevus    Body mass index (BMI) of 29.0-29.9 in adult    Benign hypertension    Overweight    Paroxysmal atrial fibrillation    Type 2 diabetes mellitus with diabetic arthropathy, with long-term current use of insulin    Fatty liver disease, nonalcoholic    False positive serological test for hepatitis C    Hyperlipidemia    Type 2 diabetes mellitus with hyperglycemia, without long-term current use of insulin    Gastroesophageal reflux disease without esophagitis    Type 2 diabetes mellitus with hyperglycemia    Vitamin B12 deficiency    Hyperuricemia    Hypovitaminosis D    Proteinuria    On enteral nutrition    Larynx cancer    Dehydration    NSVT (nonsustained ventricular tachycardia)    Hemoptysis    Adverse effect of adrenal cortical steroids, sequela    S/P laryngectomy    Hypocalcemia    Aphonia    Dysphagia, pharyngoesophageal    Acute pain of both shoulders    Bilateral arm weakness    Oral bleeding       Past Surgical History:   Procedure Laterality Date    DIRECT LARYNGOBRONCHOSCOPY N/A 12/27/2021    Procedure: LARYNGOSCOPY, DIRECT, WITH BRONCHOSCOPY;  Surgeon: Flex Espinosa MD;  Location: [facility] OR [address] [place] [address];  Service: ENT;  Laterality: N/A;    DISSECTION OF NECK Bilateral 1/6/2022    Procedure: DISSECTION, NECK;  Surgeon: Jesse James MD;  Location: [facility] OR [address] [place] [address];  Service: ENT;  Laterality: Bilateral;    FLAP PROCEDURE Right 1/6/2022    Procedure: CREATION, FREE FLAP;  Surgeon: Alise Hart MD;  Location: [facility] OR [address] [place] [address];  Service: ENT;  Laterality: Right;  Ischemic start 1351  Ischemic stop 1502    LARYNGECTOMY N/A 1/6/2022    Procedure: LARYNGECTOMY;  Surgeon: Jesse James MD;  Location: [facility] OR [address] [place] [address];  Service: ENT;  Laterality: N/A;     LARYNGOSCOPY N/A 8/4/2020    Procedure: Suspension microlaryngoscopy with biopsy, possible KTP laser treatment/excision;  Surgeon: Stew Noel MD;  [facility];  Service: ENT;  Laterality: N/A;  Microscope, telescopes, tower, microinstruments, KTP laser, rep conf# [id number] IC 7/28.    LARYNGOSCOPY N/A 3/16/2021    Procedure: Suspension microlaryngoscopy with excision of lesion, possible CO2 laser;  Surgeon: Stew Noel MD;  [facility];  Service: ENT;  Laterality: N/A;  Microscope, telescopes, tower, microinstruments, CO2 laser, rep conf# [id number] IC 3/4.    LARYNGOSCOPY N/A 4/1/2021    Procedure: Suspension microlaryngoscopy with KTP laser excision of lesion;  Surgeon: Stew Noel MD;  [facility];  Service: ENT;  Laterality: N/A;  Microscope, telescopes, tower, microinstruments, 70 degree scope, vocal fold , KTP laser, rep conf# [id number] BC    LARYNGOSCOPY N/A 12/9/2021    Procedure: Suspension microlaryngoscopy with biopsy;  Surgeon: Stew Noel MD;  [facility];  Service: ENT;  Laterality: N/A;  Microscope, telescopes, tower, microinstruments    LARYNGOSCOPY N/A 1/6/2022    Procedure: LARYNGOSCOPY;  Surgeon: Jesse James MD;  [facility];  Service: ENT;  Laterality: N/A;    LARYNGOSCOPY N/A 4/27/2022    Procedure: LARYNGOSCOPY WITH BIOPSY;  Surgeon: Jesse James MD;  [facility];  Service: ENT;  Laterality: N/A;    MICROLARYNGOSCOPY N/A 3/17/2020    Procedure: MICROLARYNGOSCOPY;  Surgeon: Jung Xiao MD;  Location: [facility] OR;  Service: ENT;  Laterality: N/A;  Laser Microlaryngoscopy  NEED TO SCHEDULE LASER from [facility] [phone]    REIMPLANTATION OF PARATHYROID TISSUE N/A 1/6/2022    Procedure: REIMPLANTATION, PARATHYROID TISSUE;  Surgeon: Jesse James MD;  Location: [facility] OR [address] [place] [address];  Service: ENT;  Laterality: N/A;    THYROIDECTOMY  1/6/2022     Procedure: THYROIDECTOMY;  Surgeon: Jesse James MD;  Location: [facility] OR [address] [place] [address];  Service: ENT;;    TRACHEOSTOMY N/A 12/27/2021    Procedure: CREATION, TRACHEOSTOMY;  Surgeon: Flex Espinosa MD;  [facility];  Service: ENT;  Laterality: N/A;        Tobacco Use:  The patient  reports that he has never smoked. He has never used smokeless tobacco.     Results for orders placed or performed during the hospital encounter of 06/08/22   EKG 12-lead    Collection Time: 06/08/22  7:00 PM    Narrative    Test Reason : R07.9,    Vent. Rate : 072 BPM     Atrial Rate : 072 BPM     P-R Int : 154 ms          QRS Dur : 084 ms      QT Int : 376 ms       P-R-T Axes : 044 019 031 degrees     QTc Int : 411 ms    Normal sinus rhythm  Normal ECG  When compared with ECG of 15-MAY-2022 08:00,  No significant change was found    Referred By: [email or web address]   SELF           Confirmed By:         Imaging Results          X-Ray Chest AP Portable (Final result)  Result time 06/08/22 19:35:38    Final result by Valente Parra MD (06/08/22 19:35:38)                 Narrative:    HISTORY: Chest Pain  head and neck carcinoma.    FINDINGS: Portable chest radiograph at 1843 hours compared to prior exams shows the cardiomediastinal silhouette and pulmonary vasculature are within normal limits.    The lungs are normally expanded, with no consolidation, large pleural effusion, or evidence of pulmonary edema. No confluent infiltrates or pneumothorax. No acute fractures or destructive osseous lesions. Surgical clips overlie the neck and supraclavicular soft tissues.    IMPRESSION: No evidence of acute cardiopulmonary disease.    Electronically signed by:  Valente Parra MD  6/8/2022 7:35 PM CDT Workstation: [id number]                               Lab Results   Component Value Date    WBC 5.73 06/09/2022    HGB 8.3 (L) 06/09/2022    HCT 27.1 (L) 06/09/2022    MCV 81 (L) 06/09/2022     06/09/2022     BMP  Lab Results   Component  Value Date     06/09/2022    K 3.3 (L) 06/09/2022     06/09/2022    CO2 22 (L) 06/09/2022    BUN 20 06/09/2022    CREATININE 0.9 06/09/2022    CALCIUM 8.8 06/09/2022    ANIONGAP 9 06/09/2022    GLU 85 06/09/2022     06/08/2022     05/23/2022       No results found for this or any previous visit.            Pre-op Assessment    I have reviewed the Patient Summary Reports.     I have reviewed the Nursing Notes. I have reviewed the NPO Status.   I have reviewed the Medications.     Review of Systems  Anesthesia Hx:  No problems with previous Anesthesia  Denies Family Hx of Anesthesia complications.   Denies Personal Hx of Anesthesia complications.   Social:  Non-Smoker    Hematology/Oncology:         -- Anemia: Current/Recent Cancer. (tongue cancer) --  Cancer in past history (throat cancer):    EENT/Dental:EENT/Dental Normal   Cardiovascular:   Hypertension    Pulmonary:  Pulmonary Normal    Renal/:  Renal/ Normal     Hepatic/GI:   GERD    Musculoskeletal:  Musculoskeletal Normal    Neurological:  Neurology Normal    Endocrine:   Diabetes    Psych:  Psychiatric Normal       Hypokalemia    Physical Exam  General: Well nourished    Airway:  Mallampati: II   Mouth Opening: Normal  TM Distance: Normal  Tongue: Normal  Neck ROM: Normal ROM    Chest/Lungs:  Clear to auscultation, Normal Respiratory Rate    Heart:  Rate: Normal  Rhythm: Regular Rhythm        Anesthesia Plan  Type of Anesthesia, risks & benefits discussed:    Anesthesia Type: Gen ETT  Intra-op Monitoring Plan: Standard ASA Monitors  Post Op Pain Control Plan: IV/PO Opioids PRN and multimodal analgesia  Induction:  IV  Airway Plan: Video  Informed Consent: Informed consent signed with the Patient and all parties understand the risks and agree with anesthesia plan.  All questions answered.   ASA Score: 3  Anesthesia Plan Notes: Will intubate through the tacheostomy.  KCL 20 meq after induction.  Multimodal analgesia with ofirmev  1000mg, decadron 8 mg.  PONV prophylaxis with Pepcid 20 mg IV, and Zofran 4 mg IV.      Ready For Surgery From Anesthesia Perspective.     .

## 2022-06-09 NOTE — H&P
[facility]  History & Physical    Subjective:      Chief Complaint/Reason for Admission: dysphagia    Cyrus Batres Jr. is a 71 y.o. male hx of laryngeal cancer now with recurrence and inability to even tolerate liquids presents to ER.    Planning chemo  hasnt taken elliquis in 2 weeks    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     Past Surgical History:   Procedure Laterality Date    DIRECT LARYNGOBRONCHOSCOPY N/A 12/27/2021    Procedure: LARYNGOSCOPY, DIRECT, WITH BRONCHOSCOPY;  Surgeon: Flex Espinosa MD;  Location: [facility] OR 2ND FLR;  Service: ENT;  Laterality: N/A;    DISSECTION OF NECK Bilateral 1/6/2022    Procedure: DISSECTION, NECK;  Surgeon: Jesse James MD;  [facility];  Service: ENT;  Laterality: Bilateral;    FLAP PROCEDURE Right 1/6/2022    Procedure: CREATION, FREE FLAP;  Surgeon: Alise Hart MD;  [facility];  Service: ENT;  Laterality: Right;  Ischemic start 1351  Ischemic stop 1502    LARYNGECTOMY N/A 1/6/2022    Procedure: LARYNGECTOMY;  Surgeon: Jesse James MD;  [facility];  Service: ENT;  Laterality: N/A;    LARYNGOSCOPY N/A 8/4/2020    Procedure: Suspension microlaryngoscopy with biopsy, possible KTP laser treatment/excision;  Surgeon: Stew Noel MD;  [facility];  Service: ENT;  Laterality: N/A;  Microscope, telescopes, tower, microinstruments, KTP laser, rep conf# [id number] IC 7/28.    LARYNGOSCOPY N/A 3/16/2021    Procedure: Suspension microlaryngoscopy with excision of lesion, possible CO2 laser;  Surgeon: Stew Noel MD;  [facility];  Service: ENT;  Laterality: N/A;  Microscope, telescopes, tower, microinstruments, CO2 laser, rep conf# [id number] IC 3/4.    LARYNGOSCOPY N/A 4/1/2021     Procedure: Suspension microlaryngoscopy with KTP laser excision of lesion;  Surgeon: Stew Noel MD;  [facility];  Service: ENT;  Laterality: N/A;  Microscope, telescopes, tower, microinstruments, 70 degree scope, vocal fold , KTP laser, rep conf# [id number] BC    LARYNGOSCOPY N/A 12/9/2021    Procedure: Suspension microlaryngoscopy with biopsy;  Surgeon: Stew Noel MD;  [facility];  Service: ENT;  Laterality: N/A;  Microscope, telescopes, tower, microinstruments    LARYNGOSCOPY N/A 1/6/2022    Procedure: LARYNGOSCOPY;  Surgeon: Jesse James MD;  [facility];  Service: ENT;  Laterality: N/A;    LARYNGOSCOPY N/A 4/27/2022    Procedure: LARYNGOSCOPY WITH BIOPSY;  Surgeon: Jesse James MD;  [facility];  Service: ENT;  Laterality: N/A;    MICROLARYNGOSCOPY N/A 3/17/2020    Procedure: MICROLARYNGOSCOPY;  Surgeon: Jung Xiao MD;  Location: [facility];  Service: ENT;  Laterality: N/A;  Laser Microlaryngoscopy  NEED TO SCHEDULE LASER from [facility] [id number]    REIMPLANTATION OF PARATHYROID TISSUE N/A 1/6/2022    Procedure: REIMPLANTATION, PARATHYROID TISSUE;  Surgeon: Jesse James MD;  Location: [facility] OR [address] [place] [address];  Service: ENT;  Laterality: N/A;    THYROIDECTOMY  1/6/2022    Procedure: THYROIDECTOMY;  Surgeon: Jesse James MD;  Location: [facility] OR [address] [place] [address];  Service: ENT;;    TRACHEOSTOMY N/A 12/27/2021    Procedure: CREATION, TRACHEOSTOMY;  Surgeon: Flex Espinosa MD;  [facility];  Service: ENT;  Laterality: N/A;     Family History   Problem Relation Age of Onset    Abnormal EKG Mother     Diabetes Father     Heart disease Father     Hypertension Father      Social History     Tobacco Use    Smoking status: Never Smoker    Smokeless tobacco: Never Used   Substance Use Topics    Alcohol use: Not Currently     Comment: occasional    Drug use: No       PTA Medications  "  Medication Sig    acetaminophen (TYLENOL) 325 MG tablet Take 325 mg by mouth every 6 (six) hours as needed for Pain.    diphenhydrAMINE (BENADRYL) 25 mg capsule Take 25 mg by mouth every 6 (six) hours as needed for Itching.    levothyroxine (SYNTHROID) 100 MCG tablet Take 1 tablet (100 mcg total) by mouth before breakfast.    losartan (COZAAR) 100 MG tablet TAKE 1 TABLET BY MOUTH EVERY DAY (Patient taking differently: Take 100 mg by mouth every evening.)    aspirin (ECOTRIN) 81 MG EC tablet Take 81 mg by mouth Daily.    blood sugar diagnostic (BLOOD GLUCOSE TEST) Strp 1 strip by Misc.(Non-Drug; Combo Route) route 2 (two) times daily before meals.    diltiaZEM HCl (TIAZAC) 120 mg 24 hr capsule Take 120 mg by mouth once daily.    esomeprazole (NEXIUM) 20 MG capsule Take 20 mg by mouth once daily.    pen needle, diabetic (BD ULTRA-FINE YULISSA PEN NEEDLE) 32 gauge x 5/32" Ndle Use once weekly. (Patient taking differently: 1 pen by Misc.(Non-Drug; Combo Route) route once a week. Use once weekly.)     Review of patient's allergies indicates:   Allergen Reactions    Pollen extracts     Lovastatin Rash     Not confirmed but pt skeptical        Review of Systems   Unable to perform ROS: Medical condition       Objective:      Vital Signs (Most Recent)  Temp: 98.4 °F (36.9 °C) (06/09/22 0748)  Pulse: 68 (06/09/22 0748)  Resp: 18 (06/09/22 1004)  BP: (!) 144/79 (06/09/22 0748)  SpO2: 100 % (06/09/22 0748)    Vital Signs Range (Last 24H):  Temp:  [98.2 °F (36.8 °C)-98.9 °F (37.2 °C)]   Pulse:  [68-87]   Resp:  [11-18]   BP: (135-151)/(76-82)   SpO2:  [98 %-100 %]     Physical Exam  Constitutional:       Appearance: Normal appearance.   HENT:      Head: Normocephalic.      Nose: Nose normal.      Mouth/Throat:      Mouth: Mucous membranes are moist.      Comments: Blood noted in oropharynx  Eyes:      Pupils: Pupils are equal, round, and reactive to light.   Neck:      Comments: Stoma CDI  Cardiovascular:      Rate " and Rhythm: Normal rate and regular rhythm.      Pulses: Normal pulses.      Heart sounds: Normal heart sounds.   Pulmonary:      Effort: Pulmonary effort is normal.      Breath sounds: Normal breath sounds.   Abdominal:      General: Abdomen is flat. Bowel sounds are normal.      Palpations: Abdomen is soft.   Musculoskeletal:         General: Normal range of motion.      Cervical back: Normal range of motion.   Skin:     General: Skin is warm and dry.      Capillary Refill: Capillary refill takes less than 2 seconds.   Neurological:      General: No focal deficit present.      Mental Status: He is alert and oriented to person, place, and time. Mental status is at baseline.   Psychiatric:         Mood and Affect: Mood normal.         Data Review:    CBC:   Lab Results   Component Value Date    WBC 5.73 06/09/2022    RBC 3.34 (L) 06/09/2022    HGB 8.3 (L) 06/09/2022    HCT 27.1 (L) 06/09/2022     06/09/2022     BMP:   Lab Results   Component Value Date    GLU 85 06/09/2022     06/09/2022    K 3.3 (L) 06/09/2022     06/09/2022    CO2 22 (L) 06/09/2022    BUN 20 06/09/2022    CREATININE 0.9 06/09/2022    CALCIUM 8.8 06/09/2022          Assessment:      Dysphagia  Laryngeal cancer recurrence now on base of tongue    Plan:    PEG and port in operating room today

## 2022-06-09 NOTE — PROGRESS NOTES
Contacted by patient's wife regarding current admission due to inability to tolerate PO. Planned for PEG tube placement. Has Glucerna 1.5 at home from when he had a feeding tube previously. Discussed with the care team. Orders placed for him home enteral supplies. Will coordinate set up with social workers.

## 2022-06-09 NOTE — PLAN OF CARE
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 Demographics, PCP and insurance verified. Patient lives with his spouse. Patient able to mouth words and answer open/ended questions. No current HH. Patient has suction machine at home. No dialysis. Patient will be transported home by family upon discharge. No further needs to discuss at this time. Case management to assist with any additional needs at discharge.      Discharge Barriers Identified: None    Payor: [employer] MANAGED MEDICARE / Plan: [employer] MEDICARE HMO / Product Type: Capitation /     Extended Emergency Contact Information  Primary Emergency Contact: Janel Batres  Address: [address] [place] [address] [facility]  Mobile Phone: [phone]  Relation: Spouse  Preferred language: English   needed? No    Discharge Plan A: Home with family  Discharge Plan B: Home      [id number] [place] - [address]  [address] [address]  Phone: [phone] Fax: [phone]    [facility] [address]  [address] [address]  Phone: [phone] Fax: [phone]      Initial Assessment (most recent)     Adult Discharge Assessment - 06/09/22 1130        Discharge Assessment    Assessment Type Discharge Planning Assessment     Confirmed/corrected address, phone number and insurance Yes     Confirmed Demographics Correct on Facesheet     Source of Information patient;family     Lives With spouse     Do you expect to return to your current living situation? Yes     Do you have help at home or someone to help you manage your care at home? Yes     Who are your caregiver(s) and  their phone number(s)? Janel Batres (Spouse)   [phone] (Mobile)     Prior to hospitilization cognitive status: Alert/Oriented     Current cognitive status: Alert/Oriented     Walking or Climbing Stairs Difficulty none     Dressing/Bathing Difficulty none     Home Accessibility wheelchair accessible     Equipment Currently Used at Home suction machine     Patient currently being followed by outpatient case management? No     Do you currently have service(s) that help you manage your care at home? No     Do you take prescription medications? Yes     Do you have prescription coverage? Yes     Do you have any problems affording any of your prescribed medications? No     Is the patient taking medications as prescribed? yes     Who is going to help you get home at discharge? Janel Batres (Spouse)   [phone] (Mobile)     How do you get to doctors appointments? car, drives self     Are you on dialysis? No     Do you take coumadin? No     Discharge Plan A Home with family     Discharge Plan B Home     DME Needed Upon Discharge  none     Discharge Plan discussed with: Patient;Spouse/sig other     Name(s) and Number(s) Batres,Janel (Spouse)   [phone] (Mobile)     Discharge Barriers Identified None        Relationship/Environment    Name(s) of Who Lives With Patient Janel Batres (Spouse)   [phone] (Mobile)

## 2022-06-09 NOTE — H&P
[facility] History & Physical Examination   Patient Name: Cyrus Batres Jr.  MRN: [id number]  Patient Class: IP- Inpatient   Admission Date: 6/8/2022  5:54 PM  Length of Stay: 0  Attending Physician: Rafiq Gaffney MD  Primary Care Provider: Jesse James MD  Face-to-Face encounter date: 06/08/2022  Code Status: Full Code  MPOA:  Chief Complaint: Dysphagia (Patient having difficulty swallowing, even water, mass at base of esophagus )        HISTORY OF PRESENT ILLNESS:   Cyrus Batres Jr. is a 71 y.o. 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 Dr Viera is surgeon on the case.          REVIEW OF SYSTEMS:   10 Point Review of System was performed and was found to be negative except for that mentioned already in the HPI above.     PAST MEDICAL HISTORY:     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       PAST SURGICAL HISTORY:     Past Surgical History:   Procedure Laterality Date    DIRECT LARYNGOBRONCHOSCOPY N/A 12/27/2021    Procedure: LARYNGOSCOPY, DIRECT, WITH BRONCHOSCOPY;  Surgeon: Flex Espinosa MD;  Location: [facility] OR [address] [place] [address];  Service: ENT;  Laterality:  N/A;    DISSECTION OF NECK Bilateral 1/6/2022    Procedure: DISSECTION, NECK;  Surgeon: Jesse James MD;  Location: [facility] OR [facility] FLR;  Service: ENT;  Laterality: Bilateral;    FLAP PROCEDURE Right 1/6/2022    Procedure: CREATION, FREE FLAP;  Surgeon: Alise Hart MD;  [facility];  Service: ENT;  Laterality: Right;  Ischemic start 1351  Ischemic stop 1502    LARYNGECTOMY N/A 1/6/2022    Procedure: LARYNGECTOMY;  Surgeon: Jesse James MD;  Location: [facility] OR [facility] FLR;  Service: ENT;  Laterality: N/A;    LARYNGOSCOPY N/A 8/4/2020    Procedure: Suspension microlaryngoscopy with biopsy, possible KTP laser treatment/excision;  Surgeon: Stew Noel MD;  [facility];  Service: ENT;  Laterality: N/A;  Microscope, telescopes, tower, microinstruments, KTP laser, rep conf# [id number] IC 7/28.    LARYNGOSCOPY N/A 3/16/2021    Procedure: Suspension microlaryngoscopy with excision of lesion, possible CO2 laser;  Surgeon: Stew Noel MD;  [facility];  Service: ENT;  Laterality: N/A;  Microscope, telescopes, tower, microinstruments, CO2 laser, rep conf# [id number] IC 3/4.    LARYNGOSCOPY N/A 4/1/2021    Procedure: Suspension microlaryngoscopy with KTP laser excision of lesion;  Surgeon: Stew Noel MD;  [facility];  Service: ENT;  Laterality: N/A;  Microscope, telescopes, tower, microinstruments, 70 degree scope, vocal fold , KTP laser, rep conf# [id number] BC    LARYNGOSCOPY N/A 12/9/2021    Procedure: Suspension microlaryngoscopy with biopsy;  Surgeon: Stew Noel MD;  [facility];  Service: ENT;  Laterality: N/A;  Microscope, telescopes, tower, microinstruments    LARYNGOSCOPY N/A 1/6/2022    Procedure: LARYNGOSCOPY;  Surgeon: Jesse James MD;  Location: [facility] OR 2ND FLR;  Service: ENT;  Laterality: N/A;    LARYNGOSCOPY N/A 4/27/2022    Procedure: LARYNGOSCOPY WITH BIOPSY;  Surgeon: Jesse AMOS  MD Erika;  Location: [facility] OR [facility] FLR;  Service: ENT;  Laterality: N/A;    MICROLARYNGOSCOPY N/A 3/17/2020    Procedure: MICROLARYNGOSCOPY;  Surgeon: Jung Xiao MD;  Location: [facility] OR;  Service: ENT;  Laterality: N/A;  Laser Microlaryngoscopy  NEED TO SCHEDULE LASER from [employer] [phone]    REIMPLANTATION OF PARATHYROID TISSUE N/A 1/6/2022    Procedure: REIMPLANTATION, PARATHYROID TISSUE;  Surgeon: Jesse James MD;  [facility];  Service: ENT;  Laterality: N/A;    THYROIDECTOMY  1/6/2022    Procedure: THYROIDECTOMY;  Surgeon: Jesse James MD;  [facility];  Service: ENT;;    TRACHEOSTOMY N/A 12/27/2021    Procedure: CREATION, TRACHEOSTOMY;  Surgeon: Flex Espinosa MD;  [facility];  Service: ENT;  Laterality: N/A;       ALLERGIES:   Pollen extracts and Lovastatin    FAMILY HISTORY:     Family History   Problem Relation Age of Onset    Abnormal EKG Mother     Diabetes Father     Heart disease Father     Hypertension Father        SOCIAL HISTORY:     Social History     Tobacco Use    Smoking status: Never Smoker    Smokeless tobacco: Never Used   Substance Use Topics    Alcohol use: Not Currently     Comment: occasional        Social History     Substance and Sexual Activity   Sexual Activity Yes    Partners: Female        HOME MEDICATIONS:     Prior to Admission medications    Medication Sig Start Date End Date Taking? Authorizing Provider   aspirin (ECOTRIN) 81 MG EC tablet Take 81 mg by mouth Daily.    Historical Provider   blood sugar diagnostic (BLOOD GLUCOSE TEST) Strp 1 strip by Misc.(Non-Drug; Combo Route) route 2 (two) times daily before meals. 1/30/19   Maico Patterson MD   diltiaZEM HCl (TIAZAC) 120 mg 24 hr capsule Take 120 mg by mouth once daily.    Historical Provider   esomeprazole (NEXIUM) 20 MG capsule Take 20 mg by mouth once daily.    Historical Provider   levothyroxine (SYNTHROID) 100 MCG tablet Take 1 tablet (100  "mcg total) by mouth before breakfast. 2/18/22 2/18/23  Fariha Muñoz, KRISTINE   losartan (COZAAR) 100 MG tablet TAKE 1 TABLET BY MOUTH EVERY DAY  Patient taking differently: Take 100 mg by mouth every evening. 7/6/21   MICHELLE Silveira PA-C   pen needle, diabetic (BD ULTRA-FINE YULISSA PEN NEEDLE) 32 gauge x 5/32" Ndle Use once weekly.  Patient taking differently: 1 pen by Misc.(Non-Drug; Combo Route) route once a week. Use once weekly. 10/4/21   MICHELLE Silveira PA-C         PHYSICAL EXAM:   BP (!) 145/78   Pulse 87   Temp 98.9 °F (37.2 °C) (Oral)   Resp 14   Ht 5' 6.5" (1.689 m)   Wt 63 kg (139 lb)   SpO2 100%   BMI 22.10 kg/m²   Vitals Reviewed  General appearance:male in no apparent distress.  Skin: No Rash.   Neuro: Motor and sensory exams grossly intact. Good tone. Power in all 4 extremities 5/5.   HENT: Atraumatic head. Moist mucous membranes of oral cavity.  Eyes: Normal extraocular movements.   Neck: Supple. No evidence of lymphadenopathy. No thyroidomegaly.  Lungs: Clear to auscultation bilaterally. No wheezing present.   Heart: Regular rate and rhythm. S1 and S2 present with no murmurs/gallop/rub. No pedal edema. No JVD present.   Abdomen: Soft, non-distended, non-tender. No rebound tenderness/guarding. Bowel sounds are normal. Bladder is not palpable.   Extremities: No cyanosis, clubbing.  Psych/mental status: Alert and oriented. Cooperative. Responds appropriately to questions.   EMERGENCY DEPARTMENT LABS AND IMAGING:     Labs Reviewed   CBC W/ AUTO DIFFERENTIAL - Abnormal; Notable for the following components:       Result Value    RBC 3.40 (*)     Hemoglobin 8.4 (*)     Hematocrit 27.3 (*)     MCV 80 (*)     MCH 24.7 (*)     MCHC 30.8 (*)     RDW 14.8 (*)     Lymph # 0.6 (*)     Gran % 80.7 (*)     Lymph % 11.0 (*)     All other components within normal limits   COMPREHENSIVE METABOLIC PANEL - Abnormal; Notable for the following components:    Total Bilirubin 1.2 (*)     All other components within " normal limits   TROPONIN I   SARS-COV-2 RNA AMPLIFICATION, QUAL   TROPONIN I   URINALYSIS, REFLEX TO URINE CULTURE   URINALYSIS, REFLEX TO URINE CULTURE   POCT GLUCOSE MONITORING CONTINUOUS       X-Ray Chest AP Portable   Final Result          ASSESSMENT & PLAN:   Cyrus Batres Jr. is a 71 y.o. male admitted for    Dysphagia due to cancer at the base of the tongue  History of laryngeal squamous cell carcinoma status post laryngectomy  Atrial fibrillation off Eliquis for the past 5 days due to bleeding at side of cancer  Diabetes type 2  Hypertension  Hypothyroidism  GERD  Normochromic normocytic anemia likely due to chronic malignancy.            Plan    Admit to med floor.  Will continue to follow up Dr. Viera's recommendations input appreciated.  Iron studies  Pantoprazole IV 40 mg a day  NS at 65 cc/hour CBGS q.6 hours  A.m. labs  Follow-up UA      Diet: NPO    DVT Prophylaxis: SCD's while in bed and Encourage ambulation. OOB as tolerated.     Discharge Planning and Disposition:  Home with assistance of family  ________________________________________________________________________________    Face-to-Face encounter date: 06/08/2022  Encounter included review of the medical records, interviewing and examining the patient face-to-face, discussion with family and other health care providers including emergency medicine physician, admission orders, interpreting lab/test results and formulating a plan of care.   ________________________________________________________________________________    INPATIENT LIST OF MEDICATIONS     Current Facility-Administered Medications:     0.9%  NaCl infusion, , Intravenous, Continuous, Rafiq Gaffney MD    pantoprazole injection 40 mg, 40 mg, Intravenous, Daily, Rafiq Gaffney MD    sodium chloride 0.9% flush 2 mL, 2 mL, Intravenous, Q6H PRN, Rafiq Gaffney MD    Current Outpatient Medications:     aspirin (ECOTRIN) 81 MG EC tablet, Take 81 mg by mouth Daily., Disp: ,  "Rfl:     blood sugar diagnostic (BLOOD GLUCOSE TEST) Strp, 1 strip by Misc.(Non-Drug; Combo Route) route 2 (two) times daily before meals., Disp: 200 strip, Rfl: 2    diltiaZEM HCl (TIAZAC) 120 mg 24 hr capsule, Take 120 mg by mouth once daily., Disp: , Rfl:     esomeprazole (NEXIUM) 20 MG capsule, Take 20 mg by mouth once daily., Disp: , Rfl:     levothyroxine (SYNTHROID) 100 MCG tablet, Take 1 tablet (100 mcg total) by mouth before breakfast., Disp: 30 tablet, Rfl: 11    losartan (COZAAR) 100 MG tablet, TAKE 1 TABLET BY MOUTH EVERY DAY (Patient taking differently: Take 100 mg by mouth every evening.), Disp: 90 tablet, Rfl: 3    pen needle, diabetic (BD ULTRA-FINE YULISSA PEN NEEDLE) 32 gauge x 5/32" Ndle, Use once weekly. (Patient taking differently: 1 pen by Misc.(Non-Drug; Combo Route) route once a week. Use once weekly.), Disp: 30 each, Rfl: 1    Facility-Administered Medications Ordered in Other Encounters:     lactated ringers infusion, , Intravenous, Continuous, Torsten Hilton MD      Scheduled Meds:   pantoprazole  40 mg Intravenous Daily     Continuous Infusions:   sodium chloride 0.9%       PRN Meds:.sodium chloride 0.9%      Rafiq Gaffney  [facility] Hospitalist  06/08/2022    "

## 2022-06-09 NOTE — CONSULTS
[facility]  Adult Nutrition   Consult Note (Initial Assessment)     SUMMARY     Recommendations  Recommendation/Intervention:  1. When PEG is placed and patent: recommend continuous Glucerna 1.5 @ 60ml/hr goal rate. Begin at 20ml/hr; advance by 10ml every 4-6 hrs to goal rate.  2. FWF 30ml every 4 hours (180 ml). Suggest additional FW flushes 250ml BID to meet fluid needs.   3. Should nocturnal feeds be desired: Glucerna 1.5 @ 120ml/hr over 12 hrs (7pm-7am) would meet kcal and protein needs.   4.Should Bolus feeds be desired: Glucerna 1.5, 6 containers/day would meet needs. Suggest 120ml free water flushes each bolus.   5. For combination of Bolus and nocturnal feeds: Glucerna 1.5 @ 60ml/hr over 12 hrs (7pm-7am) and Bolus 1 container every 4 hrs during the day (9am, 1pm, 5pm) an additional 725 ml free water is needed to meet needs daily.  6. RD to follow and adjust as needed.    Goals:   Patient darleen receive > 75% estimated kcal, protein, and fluid needs and tolerate EN without aspiration.  Nutrition Goal Status: new    Communication of RD Recs: reviewed with physician and nursing.    Dietitian Rounds Brief    Provided RN and MD with recommendations per Consult for planned new PEG placement / feeding. Patient admit with dysphagia and dehydration due to tongue cancer. Patient receiving IV fluids  ~ 25ml/kg CBW. Estimated needs 30ml/kg. Recommendations for enteral feeding: Glucerna 1.5, 1440 ml/day to provide 2160 kcal, 119 gm protein, and 1093 ml free water. Patient will need ~ 725 ml additional free water to meet fluid needs.RD to follow PRN.    Diet order:   Current Diet Order: NPO      Evaluation of Received Nutrient/Fluid Intake  IV Fluid (mL): 1560  Energy Calories Required: not meeting needs  Protein Required: not meeting needs  Fluid Required: not meeting needs  Tolerance: tolerating     % Intake of Estimated Energy Needs: 0%  % Meal Intake: NPO    No intake or data in the 24 hours ending  "06/09/22 0906. Last BM 6/8/2022.    Anthropometrics  Temp: 98.4 °F (36.9 °C)  Height Method: Stated  Height: 5' 6" (167.6 cm)  Height (inches): 66 in  Weight Method: Bed Scale  Weight: 60.7 kg (133 lb 14.4 oz)  Weight (lb): 133.9 lb  Ideal Body Weight (IBW), Male: 142 lb  % Ideal Body Weight, Male (lb): 94.3 %  BMI (Calculated): 21.6  BMI Grade: 18.5-24.9 - normal     Estimated/Assessed Needs  Weight Used For Calorie Calculations: 60.7 kg (133 lb 13.1 oz)  Energy Calorie Requirements (kcal): 1821-2124kcal/day (30-35 kcal/kg)     Protein Requirements:  gm/day (1.5-2.0 gm/kg)  Weight Used For Protein Calculations: 60.7 kg (133 lb 13.1 oz)  Fluid Requirements (mL): 1 ml/kcal or per MD     RDA Method (mL): 1821     Reason for Assessment :Consult  Relevant Medical History: diabetes type 2 hypertension hypothyroidism GERD 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  Interdisciplinary Rounds: did not attend    Nutrition/Diet History  Food Allergies: NKFA  Factors Affecting Nutritional Intake: None identified at this time    Nutrition Risk Screen  Nutrition Risk Screen: dysphagia or difficulty swallowing     MST Score: 0  Have you recently lost weight without trying?: No  Weight loss score: 0  Have you been eating poorly because of a decreased appetite?: No  Appetite score: 0     Weight History:  Wt Readings from Last 5 Encounters:   06/08/22 60.7 kg (133 lb 14.4 oz)   06/07/22 62.9 kg (138 lb 10.7 oz)   05/30/22 63.5 kg (140 lb)   05/23/22 62.2 kg (137 lb 2 oz)   05/19/22 63.5 kg (140 lb)        Lab/Procedures/Meds: Pertinent Labs/Meds Reviewed    Medications:Pertinent Medications Reviewed  Scheduled Meds:   pantoprazole  40 mg Intravenous Daily     Continuous Infusions:   sodium chloride 0.9% 65 mL/hr at 06/08/22 2158     PRN Meds:.sodium chloride 0.9%    Labs: Pertinent Labs Reviewed  Clinical Chemistry:  Recent Labs   Lab 06/08/22 1825 06/09/22  0709    136   K 3.8 3.3*   CL " 105 105   CO2 25 22*    85   BUN 22 20   CREATININE 1.0 0.9   CALCIUM 9.3 8.8   PROT 7.3  --    ALBUMIN 4.2  --    BILITOT 1.2*  --    ALKPHOS 73  --    AST 16  --    ALT 11  --    ANIONGAP 9 9   ESTGFRAFRICA >60.0 >60.0   EGFRNONAA >60.0 >60.0     CBC:   Recent Labs   Lab 06/09/22  0709   WBC 5.73   RBC 3.34*   HGB 8.3*   HCT 27.1*      MCV 81*   MCH 24.9*   MCHC 30.6*     Cardiac Profile:  Recent Labs   Lab 06/08/22  1825 06/08/22  2126   TROPONINI <0.030 <0.030     Inflammatory Labs:  No results for input(s): CRP in the last 168 hours.     Diabetes:  No results for input(s): HGBA1C in the last 168 hours.  Last A1c 6.6%    Monitor and Evaluation  Food and Nutrient Intake: energy intake, enteral nutrition intake  Food and Nutrient Adminstration: diet order, enteral and parenteral nutrition administration  Knowledge/Beliefs/Attitudes: food and nutrition knowledge/skill  Physical Activity and Function: nutrition-related ADLs and IADLs  Anthropometric Measurements: weight, weight change, body mass index  Biochemical Data, Medical Tests and Procedures: electrolyte and renal panel, lipid profile, gastrointestinal profile, glucose/endocrine profile, inflammatory profile  Nutrition-Focused Physical Findings: overall appearance     Nutrition Risk  Level of Risk/Frequency of Follow-up: high     Nutrition Follow-Up  RD Follow-up?: Yes      Janel Lemons RD, LDN 06/09/2022 9:06 AM

## 2022-06-09 NOTE — ANESTHESIA POSTPROCEDURE EVALUATION
Anesthesia Post Evaluation    Patient: Cyrus Batres Jr.    Procedure(s) Performed: Procedure(s) (LRB):  GESEPCYXV-LIXO-B-CATH (N/A)  INSERTION, PEG TUBE (N/A)    Final Anesthesia Type: general      Patient location during evaluation: PACU  Patient participation: Yes- Able to Participate  Level of consciousness: awake and alert and oriented  Post-procedure vital signs: reviewed and stable  Pain management: adequate  Airway patency: patent    PONV status at discharge: No PONV  Anesthetic complications: no      Cardiovascular status: blood pressure returned to baseline and hemodynamically stable  Respiratory status: unassisted, spontaneous ventilation and room air  Hydration status: euvolemic  Follow-up not needed.      CXR shows port in good position, no evidence of complications    Vitals Value Taken Time   /70 06/09/22 1345   Temp 36.1 °C (97 °F) 06/09/22 1321   Pulse 63 06/09/22 1346   Resp 7 06/09/22 1346   SpO2 100 % 06/09/22 1346   Vitals shown include unvalidated device data.      No case tracking events are documented in the log.      Pain/Alexi Score: Pain Rating Prior to Med Admin: 7 (6/9/2022 10:04 AM)  Alexi Score: 10 (6/9/2022  1:30 PM)

## 2022-06-09 NOTE — PLAN OF CARE
met with patient at bedside to explain IMM. Patient verbalized his understanding of the IMM and signed. IMM scanned into media.       06/09/22 1028   Medicare Message   Important Message from Medicare regarding Discharge Appeal Rights Signed/date by patient/caregiver;Explained to patient/caregiver   Date IMM was signed 06/09/22   Time IMM was signed 1028

## 2022-06-09 NOTE — PLAN OF CARE
Problem: Feeding Intolerance (Enteral Nutrition)  Goal: Feeding Tolerance  Intervention: Prevent and Manage Feeding Intolerance  Flowsheets (Taken 6/9/2022 1152)  Nutrition Support Management: (Recommendations for TF needs when PEG placed.) other (see comments)

## 2022-06-09 NOTE — TRANSFER OF CARE
"Anesthesia Transfer of Care Note    Patient: Cyrus Batres Jr.    Procedure(s) Performed: Procedure(s) (LRB):  OPULYWQZG-NSUO-X-CATH (N/A)  INSERTION, PEG TUBE (N/A)    Patient location: PACU    Anesthesia Type: general    Transport from OR: Transported from OR on room air with adequate spontaneous ventilation    Post pain: adequate analgesia    Post assessment: no apparent anesthetic complications    Post vital signs: stable    Level of consciousness: awake and alert    Nausea/Vomiting: no nausea/vomiting    Complications: none    Transfer of care protocol was followed      Last vitals:   Visit Vitals  BP (!) 144/79   Pulse 68   Temp 36.9 °C (98.4 °F) (Oral)   Resp 18   Ht 5' 6" (1.676 m)   Wt 60.7 kg (133 lb 14.4 oz)   SpO2 100%   BMI 21.61 kg/m²     "

## 2022-06-10 VITALS
HEIGHT: 66 IN | BODY MASS INDEX: 21.52 KG/M2 | TEMPERATURE: 99 F | OXYGEN SATURATION: 97 % | RESPIRATION RATE: 18 BRPM | SYSTOLIC BLOOD PRESSURE: 124 MMHG | WEIGHT: 133.88 LBS | DIASTOLIC BLOOD PRESSURE: 76 MMHG | HEART RATE: 78 BPM

## 2022-06-10 LAB
ANION GAP SERPL CALC-SCNC: 6 MMOL/L (ref 8–16)
BUN SERPL-MCNC: 18 MG/DL (ref 8–23)
CALCIUM SERPL-MCNC: 8.6 MG/DL (ref 8.7–10.5)
CHLORIDE SERPL-SCNC: 102 MMOL/L (ref 95–110)
CO2 SERPL-SCNC: 27 MMOL/L (ref 23–29)
CREAT SERPL-MCNC: 0.9 MG/DL (ref 0.5–1.4)
EST. GFR  (AFRICAN AMERICAN): >60 ML/MIN/1.73 M^2
EST. GFR  (NON AFRICAN AMERICAN): >60 ML/MIN/1.73 M^2
GLUCOSE SERPL-MCNC: 118 MG/DL (ref 70–110)
GLUCOSE SERPL-MCNC: 126 MG/DL (ref 70–110)
GLUCOSE SERPL-MCNC: 140 MG/DL (ref 70–110)
GLUCOSE SERPL-MCNC: 152 MG/DL (ref 70–110)
POTASSIUM SERPL-SCNC: 3.9 MMOL/L (ref 3.5–5.1)
SODIUM SERPL-SCNC: 135 MMOL/L (ref 136–145)

## 2022-06-10 PROCEDURE — C9113 INJ PANTOPRAZOLE SODIUM, VIA: HCPCS | Performed by: SURGERY

## 2022-06-10 PROCEDURE — 36415 COLL VENOUS BLD VENIPUNCTURE: CPT | Performed by: SURGERY

## 2022-06-10 PROCEDURE — 80048 BASIC METABOLIC PNL TOTAL CA: CPT | Performed by: SURGERY

## 2022-06-10 PROCEDURE — 63600175 PHARM REV CODE 636 W HCPCS: Performed by: SURGERY

## 2022-06-10 RX ADMIN — PANTOPRAZOLE SODIUM 40 MG: 40 INJECTION, POWDER, FOR SOLUTION INTRAVENOUS at 09:06

## 2022-06-10 NOTE — PLAN OF CARE
[facility] accepting patient to provided PEG tube feeding.        06/10/22 8690   Post-Acute Status   Post-Acute Authorization HME   HME Status Set-up Complete/Auth obtained

## 2022-06-10 NOTE — RESPIRATORY THERAPY
06/09/22 2043   PRE-TX-O2   O2 Device (Oxygen Therapy) room air   SpO2 100 %   Pulse Oximetry Type Intermittent   $ Pulse Oximetry - Multiple Charge Pulse Oximetry - Multiple   Education   $ Education 15 min   Respiratory Evaluation   $ Care Plan Tech Time 15 min

## 2022-06-10 NOTE — PLAN OF CARE
Pt compliant with POC . RR even and unlabored. Peg Feedings at goal rate. NO signs of distress noted. Tolerating Well .     Problem: Adult Inpatient Plan of Care  Goal: Plan of Care Review  Outcome: Ongoing, Progressing  Goal: Patient-Specific Goal (Individualized)  Outcome: Ongoing, Progressing  Goal: Absence of Hospital-Acquired Illness or Injury  Outcome: Ongoing, Progressing  Goal: Optimal Comfort and Wellbeing  Outcome: Ongoing, Progressing  Goal: Readiness for Transition of Care  Outcome: Ongoing, Progressing     Problem: Diabetes Comorbidity  Goal: Blood Glucose Level Within Targeted Range  Outcome: Ongoing, Progressing     Problem: Fall Injury Risk  Goal: Absence of Fall and Fall-Related Injury  Outcome: Ongoing, Progressing

## 2022-06-10 NOTE — DISCHARGE SUMMARY
[facility]  Discharge Summary      Patient Name: Cyrus Batres Jr.  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 Later found to have biopsy-proven disease at the left base of tongue/neopharynx, a poorly differentiated squamous cell carcinoma.  PET-CT was negative for lymphadenopathy or distant disease.  He was scheduled to see medical oncology and outpatient surgery for PEG tube and Port-A-Cath placement.  However given worsening dysphagia and inability to take liquids he presented here.      Seen by General surgery and underwent gastrostomy tube and Port-A-Cath placement.  Initiated on tube feeds which he has been tolerating without any issues.  He has been deemed medically stable to be discharged home.  Advised to follow-up with medical oncology.  He has outpatient dietitian who has been following him peripherally who will assist him with continuing tube feeds.  Return precautions discussed.    Physical exam on the day of discharge:  General: Patient resting comfortably in no acute distress.  Lungs:  No tachypnea or accessory muscle use  Neck:  Skin graft noted.  Prior healed incisions noted.  Abd: Soft. Nontender. Non-distended.  Neuro: A&O x3. Moving all 4 extremities equally         Goals of Care  Treatment Preferences:  Code Status: Full Code      Consults:   Consults (From admission, onward)        Status Ordering Provider     Inpatient consult to Registered Dietitian/Nutritionist  Once        Provider:  (Not yet assigned)    Completed NIVIA JAIN     Inpatient consult to Hospitalist  Once        Provider:  Nivia Jain MD    Acknowledged RUSTY OSBORN     Inpatient consult to General surgery  Once        Provider:  Rusty Osborn MD    Completed MICHELLE ESCOBEDO new Assessment & Plan notes have been filed under this hospital service since the last note was generated.  Service: Hospital Medicine    Final Active Diagnoses:    Diagnosis Date Noted POA    PRINCIPAL PROBLEM:  Dysphagia, pharyngoesophageal [R13.14] 01/31/2022 Yes    Larynx cancer [C32.9] 08/04/2020 Yes    Paroxysmal atrial fibrillation [I48.0] 07/03/2017 Yes     Chronic    Benign hypertension [I10] 06/08/2016 Yes     Chronic      Problems Resolved During this Admission:       Discharged Condition: stable    Disposition: Home or Self Care    Follow Up:   Follow-up Information     Maico Patterson MD Follow up in 4 week(s).    Specialty: Internal Medicine  Why: As needed, If symptoms worsen  Contact information:  [address] [facility]  [address] [place] [id number]  [phone]                       Patient Instructions:      Notify your health care provider if you experience any of the following:  temperature >100.4     Notify your health care provider if you experience any of the following:  persistent nausea and vomiting or diarrhea     Notify your health care provider if you experience any of the following:  severe uncontrolled pain     Notify your health care provider if you experience any of the following:  difficulty breathing or increased cough     Notify your health care provider if you experience any of the following:  persistent dizziness, light-headedness, or visual disturbances     Notify your health care  provider if you experience any of the following:  increased confusion or weakness     Tube Feedings/Formulas   Order Comments: Tube Feedings/Formulas [facility]; Glucerna 1.5; 20; Gastrostomy; Tube Feeding Bag; 30 ml; Every 4 hours  Continuous       Comments: Advance by 10 ml every 4-6 hours to goal rate of 60 ml/hr  Question Answer Comment  Patient location: [facility]   Select Formula: Glucerna 1.5   Formula Rate (mL/hr): 20   Route: Gastrostomy   Container for Formula Delivery Tube Feeding Bag   Free water flush volume 30 ml   Free water flush frequency Every 4 hours     Order Specific Question Answer Comments   Select Adult Formula: Other    Route: Gastrostomy    Formula Rate (mL/hr): 60      Activity as tolerated       Significant Diagnostic Studies: Labs:   CMP   Recent Labs   Lab 06/08/22 1825 06/09/22  0709 06/10/22  0458    136 135*   K 3.8 3.3* 3.9    105 102   CO2 25 22* 27    85 126*   BUN 22 20 18   CREATININE 1.0 0.9 0.9   CALCIUM 9.3 8.8 8.6*   PROT 7.3  --   --    ALBUMIN 4.2  --   --    BILITOT 1.2*  --   --    ALKPHOS 73  --   --    AST 16  --   --    ALT 11  --   --    ANIONGAP 9 9 6*   ESTGFRAFRICA >60.0 >60.0 >60.0   EGFRNONAA >60.0 >60.0 >60.0    and CBC   Recent Labs   Lab 06/08/22 1825 06/09/22  0709   WBC 5.71 5.73   HGB 8.4* 8.3*   HCT 27.3* 27.1*    256       Medications:  Reconciled Home Medications:      Medication List      CHANGE how you take these medications    losartan 100 MG tablet  Commonly known as: COZAAR  TAKE 1 TABLET BY MOUTH EVERY DAY  What changed: when to take this        CONTINUE taking these medications    acetaminophen 325 MG tablet  Commonly known as: TYLENOL  Take 325 mg by mouth every 6 (six) hours as needed for Pain.     diphenhydrAMINE 25 mg capsule  Commonly known as: BENADRYL  Take 25 mg by mouth every 6 (six) hours as needed for Itching.     levothyroxine 100 MCG tablet  Commonly known as: SYNTHROID  Take 1 tablet (100 mcg total) by mouth before  "breakfast.        STOP taking these medications    aspirin 81 MG EC tablet  Commonly known as: ECOTRIN     blood sugar diagnostic Strp  Commonly known as: BLOOD GLUCOSE TEST     diltiaZEM HCl 120 mg 24 hr capsule  Commonly known as: TIAZAC     esomeprazole 20 MG capsule  Commonly known as: NEXIUM     pen needle, diabetic 32 gauge x 5/32" Ndle  Commonly known as: BD ULTRA-FINE YULISSA PEN NEEDLE            Indwelling Lines/Drains at time of discharge:   Lines/Drains/Airways     Drain  Duration                Gastrostomy/Enterostomy 12/27/21 1152 Percutaneous endoscopic gastrostomy (PEG)  days         Gastrostomy/Enterostomy 06/09/22 1935 midline feeding <1 day          Airway  Duration                Laryngectomy (Do Not Remove) 06/09/22 1414 Laryngectomy stoma <1 day                Time spent on the discharge of patient: 35 minutes         Jorge Yee MD  Department of Hospital Medicine  [facility] hospital  "

## 2022-06-10 NOTE — PLAN OF CARE
faxed PEG tube feeding orders to [facility], fax # [phone].  spoke Yuni at [facility] and request made for facility to send patient home with atleast 4 days worth of feeding supply, while insurance being verified and auth received. MD notified.       06/10/22 1614   Post-Acute Status   [facility] Sent

## 2022-06-10 NOTE — PLAN OF CARE
Patient cleared for discharge from case management. Patient to discharge home with family. Antonietamed to provided PEG tube feeding supplies. Patient's spouse reports via phone to , that she is able to provided bolus feedings for patient and has previously provided this care to patient.       06/10/22 1621   Final Note   Assessment Type Final Discharge Note   Anticipated Discharge Disposition Home   What phone number can be called within the next 1-3 days to see how you are doing after discharge? [phone]

## 2022-06-10 NOTE — NURSING
Walking rounds patient sitting bed both incisions clean and dry peg tube patent iv fluids running at 65 hr has no complaints or requests at this time no family in room at this time

## 2022-06-10 NOTE — PROGRESS NOTES
[facility]  Adult Nutrition   Progress Note (Follow-Up)    SUMMARY     Recommendations  Recommendation/Intervention: 1. Recommend transition to bolus feeds at home. Glucerna 1.5 (1440 ml/day) or 6 cartons per day with an additional 1000 ml free water per day to meet fluid needs. (to provide 2160 kcal/day, 119 g/day protein, and 2093 ml/day free water).  Goals: Patient to tolerate tube feeding. Nutrition provision to meet needs. Patient to transition home with home tube feeding.   Nutrition Goal Status: new  Communication of RD Recs: reviewed with physician (reviewed with )    Dietitian Rounds Brief  PEG placed. Patient is tolerating tube feeding @ 50 ml/hr. Goal rate is 60 ml/hr. Tolerating well with no N/V abdominal pain or discomfort per patient. Patient is able to communicate by saying yes or no and writing on board for RD. Plans for patient to discharge home with tube feeding. RD spoke with  to make sure patient is set up with what he needs for discharge. Social work spoke with patient's wife. She is familiar with bolus feeding and has some supplies from last time patient required tube feeding. Wife requested that all medications be liquid, this will be deferred to pharmacy for best administration of medications via PEG. RD modified tube feeding order to bolus feeds.     Reason for Assessment  Reason For Assessment: RD follow-up  Relevant Medical History: diabetes type 2 hypertension hypothyroidism GERD 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  Interdisciplinary Rounds: did not attend  Nutrition Discharge Planning: PEG tube feeding, bolus (Glucerna 1.5 = 1440 ml/day) - 6 cartons per day    Nutrition Risk Screen  Nutrition Risk Screen: dysphagia or difficulty swallowing     MST Score: 0  Have you recently lost weight without trying?: No  Weight loss score: 0  Have you been eating poorly because of a decreased appetite?: No  Appetite score:  "0       Nutrition/Diet History  Food Allergies: NKFA  Factors Affecting Nutritional Intake: None identified at this time    Anthropometrics  Temp: 98.8 °F (37.1 °C)  Height Method: Stated  Height: 5' 6" (167.6 cm)  Height (inches): 66 in  Weight Method: Bed Scale  Weight: 60.7 kg (133 lb 14.4 oz)  Weight (lb): 133.9 lb  Ideal Body Weight (IBW), Male: 142 lb  % Ideal Body Weight, Male (lb): 94.3 %  BMI (Calculated): 21.6  BMI Grade: 18.5-24.9 - normal       Weight History:  Wt Readings from Last 10 Encounters:   06/08/22 60.7 kg (133 lb 14.4 oz)   06/07/22 62.9 kg (138 lb 10.7 oz)   05/30/22 63.5 kg (140 lb)   05/23/22 62.2 kg (137 lb 2 oz)   05/19/22 63.5 kg (140 lb)   05/15/22 63.5 kg (140 lb)   05/15/22 63.5 kg (140 lb)   05/15/22 63.5 kg (140 lb)   05/13/22 63.5 kg (140 lb)   05/10/22 65.3 kg (144 lb)       Lab/Procedures/Meds: Pertinent Labs Reviewed    Clinical Chemistry:  Recent Labs   Lab 06/08/22  1825 06/09/22  0709 06/10/22  0458      < > 135*   K 3.8   < > 3.9      < > 102   CO2 25   < > 27      < > 126*   BUN 22   < > 18   CREATININE 1.0   < > 0.9   CALCIUM 9.3   < > 8.6*   PROT 7.3  --   --    ALBUMIN 4.2  --   --    BILITOT 1.2*  --   --    ALKPHOS 73  --   --    AST 16  --   --    ALT 11  --   --    ANIONGAP 9   < > 6*   ESTGFRAFRICA >60.0   < > >60.0   EGFRNONAA >60.0   < > >60.0    < > = values in this interval not displayed.       CBC:   Recent Labs   Lab 06/09/22  0709   WBC 5.73   RBC 3.34*   HGB 8.3*   HCT 27.1*      MCV 81*   MCH 24.9*   MCHC 30.6*       Cardiac Profile:  Recent Labs   Lab 06/08/22  1825 06/08/22  2126   TROPONINI <0.030 <0.030       Medications: Pertinent Medications reviewed    Scheduled Meds:   pantoprazole  40 mg Intravenous Daily       Continuous Infusions:   sodium chloride 0.9% 65 mL/hr at 06/08/22 2158       PRN Meds:.dextrose 50%, dextrose 50%, morphine, sodium chloride 0.9%    Estimated/Assessed Needs  Weight Used For Calorie Calculations: " 60.7 kg (133 lb 13.1 oz)  Energy Calorie Requirements (kcal): 1821-2124kcal/day (30-35 kcal/kg)     Protein Requirements:  gm/day (1.5-2.0 gm/kg)  Weight Used For Protein Calculations: 60.7 kg (133 lb 13.1 oz)  Fluid Requirements (mL): 1 ml/kcal or per MD     RDA Method (mL): 1821       Nutrition Prescription Ordered  Current Diet Order: NPO  Current Nutrition Support Formula Ordered: Glucerna 1.5  Current Nutrition Support Rate Ordered: 50 (ml)  Current Nutrition Support Frequency Ordered: ml/hr    Evaluation of Received Nutrient/Fluid Intake  Enteral Calories (kcal): 1800  Enteral Protein (gm): 99  Energy Calories Required: not meeting needs  Protein Required: not meeting needs  Fluid Required: meeting needs  Tolerance: tolerating     Intake/Output Summary (Last 24 hours) at 6/10/2022 1416  Last data filed at 6/10/2022 1310  Gross per 24 hour   Intake 150 ml   Output 1275 ml   Net -1125 ml        Nutrition Risk  Level of Risk/Frequency of Follow-up: high     Monitor and Evaluation  Food and Nutrient Intake: energy intake, enteral nutrition intake  Food and Nutrient Adminstration: diet order, enteral and parenteral nutrition administration  Knowledge/Beliefs/Attitudes: food and nutrition knowledge/skill  Physical Activity and Function: nutrition-related ADLs and IADLs  Anthropometric Measurements: weight, weight change, body mass index  Biochemical Data, Medical Tests and Procedures: electrolyte and renal panel, lipid profile, gastrointestinal profile, glucose/endocrine profile, inflammatory profile  Nutrition-Focused Physical Findings: overall appearance     Nutrition Follow-Up  RD Follow-up?: Yes    Terrie Camp RD 06/10/2022 2:21 PM

## 2022-06-13 ENCOUNTER — PATIENT OUTREACH (OUTPATIENT)
Dept: FAMILY MEDICINE | Facility: CLINIC | Age: 72
End: 2022-06-13

## 2022-06-13 ENCOUNTER — PATIENT MESSAGE (OUTPATIENT)
Dept: ENDOCRINOLOGY | Facility: CLINIC | Age: 72
End: 2022-06-13
Payer: MEDICARE

## 2022-06-13 NOTE — TELEPHONE ENCOUNTER
Discharge Information     Discharge Date:   06/10/22    Primary Discharge Diagnosis:  Dysphagia    Discharge Summary:  Reviewed      Medication & Order Review     Were medication changes made or new medications added?   No    If so, has the patient filled the prescriptions?  NA     Was Home Health ordered? No    If so, has Home Health contacted patient and/or initiated services?  NA    Name of Home Health Agency? N/A    Durable Medical Equipment ordered?  Yes     If so, has the DME provider contacted patient and delivered equipment?  yes    Follow Up               Any problems since discharge? Yes    How is the patient feeling since returning home?  Restless, not sleeping at night    Have you set up recommended follow up appointments?  (cardiology, surgery, etc.)    Will call back to schedule appt  Notes:         Amber Ku

## 2022-06-14 ENCOUNTER — TELEPHONE (OUTPATIENT)
Dept: HEMATOLOGY/ONCOLOGY | Facility: CLINIC | Age: 72
End: 2022-06-14
Payer: MEDICARE

## 2022-06-15 ENCOUNTER — DOCUMENTATION ONLY (OUTPATIENT)
Dept: HEMATOLOGY/ONCOLOGY | Facility: CLINIC | Age: 72
End: 2022-06-15
Payer: MEDICARE

## 2022-06-15 ENCOUNTER — TELEPHONE (OUTPATIENT)
Dept: SURGICAL ONCOLOGY | Facility: CLINIC | Age: 72
End: 2022-06-15
Payer: MEDICARE

## 2022-06-15 DIAGNOSIS — C32.9 LARYNX CANCER: Primary | ICD-10-CM

## 2022-06-15 NOTE — PROGRESS NOTES
Social Work Consult    Name:Cyrus Batres Jr.  : 1950  MRN: [id number]    Referral:MAGDALENA BROUSSARD covering for Daisha May, LCSW while she is rounding.     Consult from Melissa Landeros RD, patient has not received tube feeding supplies despite referral being sent on 6/10.     BOBO spoke to rep at [facility] who went through fax log and saw order. She will notify Julia, intake rep at [facility] that spoke to BOBO about needs when patient was being discharged.    Julia called back, supplies are en route.    BOBO notified patients wife, Janel, [phone]. She had questions about whether she would need to call to reorder. BOBO provided [facility] number and Julia's name as well as SW's name and number. BOBO also notified Melissa Landeros RD.

## 2022-06-15 NOTE — TELEPHONE ENCOUNTER
Brittney RN Palliative Care calls about 6/7/2022 referral. States received message about referral to contact patient, but referral not in her que. Asks for change, but explained this nurse doesn't put in/change referrals. Sent message to  asking for new referral directly to Dr.Smita Garrett and gave Brittney's phone for questions/comments.

## 2022-06-16 ENCOUNTER — TELEPHONE (OUTPATIENT)
Dept: CARDIOLOGY | Facility: CLINIC | Age: 72
End: 2022-06-16

## 2022-06-16 NOTE — TELEPHONE ENCOUNTER
----- Message from Anne Ugarte NP sent at 6/8/2022  2:48 PM CDT -----  Can we refer him to EP for watchman please.  ----- Message -----  From: Jesse James MD  Sent: 6/8/2022   1:22 PM CDT  To: Anne Ugarte NP    Hi, Anne. I'm Mr. Batres's head and neck cancer surgeon.  He will soon be starting palliative chemo but wanted me to let you know that he's interested in the watchman device since he is off of Eliquis. Thanks.    Diego James

## 2022-06-17 ENCOUNTER — TELEPHONE (OUTPATIENT)
Dept: ELECTROPHYSIOLOGY | Facility: CLINIC | Age: 72
End: 2022-06-17
Payer: MEDICARE

## 2022-06-17 ENCOUNTER — DOCUMENTATION ONLY (OUTPATIENT)
Dept: HEMATOLOGY/ONCOLOGY | Facility: CLINIC | Age: 72
End: 2022-06-17
Payer: MEDICARE

## 2022-06-17 NOTE — PROGRESS NOTES
BOBO received a call from patients wife, Janel, regarding a charge from [facility] for Glucerna.    She was unsure why she received a bill from [facility] for Glucerna since [facility] was now providing the formula.     While discussing the charge Janel realized the bill was from March-April when the patient was taking the Glucerna PO which is not covered by insurance.    BOBO explained if supplement is needed while not using PEG than Boost can be provided.

## 2022-06-17 NOTE — TELEPHONE ENCOUNTER
Spoke with MsParam Medardo to schedule an appt for her  with Dr. Jean to discuss a watchman. I have informed her Dr. Jean does not have any availability in [place] until August 31st. But he can see him at [facility] this Monday coming for 9a with an ECG at 845a. She has informed me she has to speak with her  about it first and she will call me back with his decision. I have also informed her the appt may not be available when she calls back but we will be able to work him in the next week. The pt's wife verbalized understanding.

## 2022-06-22 ENCOUNTER — OFFICE VISIT (OUTPATIENT)
Dept: HEMATOLOGY/ONCOLOGY | Facility: CLINIC | Age: 72
End: 2022-06-22
Payer: MEDICARE

## 2022-06-22 ENCOUNTER — TELEPHONE (OUTPATIENT)
Dept: FAMILY MEDICINE | Facility: CLINIC | Age: 72
End: 2022-06-22
Payer: MEDICARE

## 2022-06-22 VITALS
BODY MASS INDEX: 22.85 KG/M2 | TEMPERATURE: 98 F | RESPIRATION RATE: 20 BRPM | SYSTOLIC BLOOD PRESSURE: 115 MMHG | DIASTOLIC BLOOD PRESSURE: 58 MMHG | OXYGEN SATURATION: 100 % | HEIGHT: 66 IN | HEART RATE: 86 BPM | WEIGHT: 142.19 LBS

## 2022-06-22 DIAGNOSIS — C01 PRIMARY CANCER OF BASE OF TONGUE: Primary | ICD-10-CM

## 2022-06-22 PROBLEM — Z71.89 ADVANCED CARE PLANNING/COUNSELING DISCUSSION: Status: ACTIVE | Noted: 2022-06-22

## 2022-06-22 PROCEDURE — 99999 PR PBB SHADOW E&M-EST. PATIENT-LVL V: CPT | Mod: PBBFAC,,, | Performed by: INTERNAL MEDICINE

## 2022-06-22 PROCEDURE — 3066F PR DOCUMENTATION OF TREATMENT FOR NEPHROPATHY: ICD-10-PCS | Mod: CPTII,S$GLB,, | Performed by: INTERNAL MEDICINE

## 2022-06-22 PROCEDURE — 3008F BODY MASS INDEX DOCD: CPT | Mod: CPTII,S$GLB,, | Performed by: INTERNAL MEDICINE

## 2022-06-22 PROCEDURE — 1160F PR REVIEW ALL MEDS BY PRESCRIBER/CLIN PHARMACIST DOCUMENTED: ICD-10-PCS | Mod: CPTII,S$GLB,, | Performed by: INTERNAL MEDICINE

## 2022-06-22 PROCEDURE — 3074F PR MOST RECENT SYSTOLIC BLOOD PRESSURE < 130 MM HG: ICD-10-PCS | Mod: CPTII,S$GLB,, | Performed by: INTERNAL MEDICINE

## 2022-06-22 PROCEDURE — 3078F PR MOST RECENT DIASTOLIC BLOOD PRESSURE < 80 MM HG: ICD-10-PCS | Mod: CPTII,S$GLB,, | Performed by: INTERNAL MEDICINE

## 2022-06-22 PROCEDURE — 3008F PR BODY MASS INDEX (BMI) DOCUMENTED: ICD-10-PCS | Mod: CPTII,S$GLB,, | Performed by: INTERNAL MEDICINE

## 2022-06-22 PROCEDURE — 3066F NEPHROPATHY DOC TX: CPT | Mod: CPTII,S$GLB,, | Performed by: INTERNAL MEDICINE

## 2022-06-22 PROCEDURE — 1159F MED LIST DOCD IN RCRD: CPT | Mod: CPTII,S$GLB,, | Performed by: INTERNAL MEDICINE

## 2022-06-22 PROCEDURE — 3060F PR POS MICROALBUMINURIA RESULT DOCUMENTED/REVIEW: ICD-10-PCS | Mod: CPTII,S$GLB,, | Performed by: INTERNAL MEDICINE

## 2022-06-22 PROCEDURE — 3074F SYST BP LT 130 MM HG: CPT | Mod: CPTII,S$GLB,, | Performed by: INTERNAL MEDICINE

## 2022-06-22 PROCEDURE — 1125F PR PAIN SEVERITY QUANTIFIED, PAIN PRESENT: ICD-10-PCS | Mod: CPTII,S$GLB,, | Performed by: INTERNAL MEDICINE

## 2022-06-22 PROCEDURE — 1159F PR MEDICATION LIST DOCUMENTED IN MEDICAL RECORD: ICD-10-PCS | Mod: CPTII,S$GLB,, | Performed by: INTERNAL MEDICINE

## 2022-06-22 PROCEDURE — 99215 OFFICE O/P EST HI 40 MIN: CPT | Mod: S$GLB,,, | Performed by: INTERNAL MEDICINE

## 2022-06-22 PROCEDURE — 3060F POS MICROALBUMINURIA REV: CPT | Mod: CPTII,S$GLB,, | Performed by: INTERNAL MEDICINE

## 2022-06-22 PROCEDURE — 4010F PR ACE/ARB THEARPY RXD/TAKEN: ICD-10-PCS | Mod: CPTII,S$GLB,, | Performed by: INTERNAL MEDICINE

## 2022-06-22 PROCEDURE — 1111F DSCHRG MED/CURRENT MED MERGE: CPT | Mod: CPTII,S$GLB,, | Performed by: INTERNAL MEDICINE

## 2022-06-22 PROCEDURE — 1125F AMNT PAIN NOTED PAIN PRSNT: CPT | Mod: CPTII,S$GLB,, | Performed by: INTERNAL MEDICINE

## 2022-06-22 PROCEDURE — 1101F PT FALLS ASSESS-DOCD LE1/YR: CPT | Mod: CPTII,S$GLB,, | Performed by: INTERNAL MEDICINE

## 2022-06-22 PROCEDURE — 99999 PR PBB SHADOW E&M-EST. PATIENT-LVL V: ICD-10-PCS | Mod: PBBFAC,,, | Performed by: INTERNAL MEDICINE

## 2022-06-22 PROCEDURE — 3288F PR FALLS RISK ASSESSMENT DOCUMENTED: ICD-10-PCS | Mod: CPTII,S$GLB,, | Performed by: INTERNAL MEDICINE

## 2022-06-22 PROCEDURE — 4010F ACE/ARB THERAPY RXD/TAKEN: CPT | Mod: CPTII,S$GLB,, | Performed by: INTERNAL MEDICINE

## 2022-06-22 PROCEDURE — 1101F PR PT FALLS ASSESS DOC 0-1 FALLS W/OUT INJ PAST YR: ICD-10-PCS | Mod: CPTII,S$GLB,, | Performed by: INTERNAL MEDICINE

## 2022-06-22 PROCEDURE — 3044F PR MOST RECENT HEMOGLOBIN A1C LEVEL <7.0%: ICD-10-PCS | Mod: CPTII,S$GLB,, | Performed by: INTERNAL MEDICINE

## 2022-06-22 PROCEDURE — 3044F HG A1C LEVEL LT 7.0%: CPT | Mod: CPTII,S$GLB,, | Performed by: INTERNAL MEDICINE

## 2022-06-22 PROCEDURE — 99215 PR OFFICE/OUTPT VISIT, EST, LEVL V, 40-54 MIN: ICD-10-PCS | Mod: S$GLB,,, | Performed by: INTERNAL MEDICINE

## 2022-06-22 PROCEDURE — 3078F DIAST BP <80 MM HG: CPT | Mod: CPTII,S$GLB,, | Performed by: INTERNAL MEDICINE

## 2022-06-22 PROCEDURE — 1111F PR DISCHARGE MEDS RECONCILED W/ CURRENT OUTPATIENT MED LIST: ICD-10-PCS | Mod: CPTII,S$GLB,, | Performed by: INTERNAL MEDICINE

## 2022-06-22 PROCEDURE — 1160F RVW MEDS BY RX/DR IN RCRD: CPT | Mod: CPTII,S$GLB,, | Performed by: INTERNAL MEDICINE

## 2022-06-22 PROCEDURE — 3288F FALL RISK ASSESSMENT DOCD: CPT | Mod: CPTII,S$GLB,, | Performed by: INTERNAL MEDICINE

## 2022-06-22 RX ORDER — ONDANSETRON 8 MG/1
8 TABLET, ORALLY DISINTEGRATING ORAL EVERY 8 HOURS PRN
Qty: 60 TABLET | Refills: 5 | Status: SHIPPED | OUTPATIENT
Start: 2022-06-22 | End: 2022-09-01

## 2022-06-22 RX ORDER — OLANZAPINE 5 MG/1
5 TABLET ORAL NIGHTLY
Qty: 30 TABLET | Refills: 5 | Status: SHIPPED | OUTPATIENT
Start: 2022-06-22 | End: 2022-10-13

## 2022-06-22 RX ORDER — DEXAMETHASONE 4 MG/1
8 TABLET ORAL DAILY
Qty: 24 TABLET | Refills: 2 | Status: SHIPPED | OUTPATIENT
Start: 2022-06-23 | End: 2022-10-13 | Stop reason: ALTCHOICE

## 2022-06-22 NOTE — PLAN OF CARE
START ON PATHWAY REGIMEN - Head and Neck    [id number]        Carboplatin       Fluorouracil       Pembrolizumab (Keytruda)           Additional Orders: Serious immune-mediated adverse events can occur with   pembrolizumab. Please monitor your patient and refer to the linked   immune-mediated adverse reaction management materials for more information.    **Always confirm dose/schedule in your pharmacy ordering system**    Patient Characteristics:  Oropharynx, HPV Negative/Unknown, Local Recurrence, Not a Candidate for   Radiation Therapy, No Prior Platinum-Based Chemoradiation within 6 Months, PD-L1   Expression Negative (CPS < 1) / Unknown  Disease Classification: Oropharynx  HPV Status: Negative (-)  Therapeutic Status: Local Recurrence  Is patient a candidate for radiation therapy<= No  Prior Platinum Status: No Prior Platinum-Based Chemoradiation within 6 Months  PD-L1 Expression Status: Awaiting Test Results  Intent of Therapy:  Non-Curative / Palliative Intent, Discussed with Patient

## 2022-06-22 NOTE — PROGRESS NOTES
Service Date:  6/22/22    Chief Complaint: malignant neoplasm of true vocal cords    Cyrus Batres Jr. is a 71 y.o. male referred here by Dr. Noel for laryngeal cancer.      Oncological history is as followed:  10/30/2020: completion of IMRT to larynx and bilateral necks via SiB totaling 6996 cGy.  Diagnosed as a T2 N0 M0 lesion at that time, stage II.   3/16/2021: SML left VF resection encompassing AC; margins negative on frozen, some margins positive on permanent  4/1/2021: SML KTP left transmuscular cordectomy, anterior right sublig resection encompassing AC; left TVF clear with negative margin;  right TVF frozen margins negative but positive on permanent  5/6/2021: SML KTP transmusc resection anterior right TVF; negative for carcinoma  1/6/2022: s/p Total laryngectomy; T4aN0 +PNI; no adjuvant radiation therapy or systemic therapy given    Now with malignancy at the base of the tongue [id number]. Negative mets on PET scan. Patient is not a candidate for further radiation therapy and does not want surgery as he would need a glossectomy.      Review of Systems   Constitutional: Negative.    HENT: Negative.    Eyes: Negative.    Respiratory: Negative.    Cardiovascular: Negative.    Gastrointestinal: Negative.    Endocrine: Negative.    Genitourinary: Negative.    Neurological: Negative.    Hematological: Negative.    Psychiatric/Behavioral: Negative.         Current Outpatient Medications   Medication Instructions    acetaminophen (TYLENOL) 325 mg, Oral, Every 6 hours PRN    [START ON 6/23/2022] dexAMETHasone (DECADRON) 8 mg, Oral, Daily, Take as directed on days 2, 3, and 4 of your chemotherapy cycle.    diphenhydrAMINE (BENADRYL) 25 mg, Oral, Every 6 hours PRN    levothyroxine (SYNTHROID) 100 mcg, Oral, Before breakfast    losartan (COZAAR) 100 MG tablet TAKE 1 TABLET BY MOUTH EVERY DAY    OLANZapine (ZYPREXA) 5 mg, Oral, Nightly, Take as directed on days 1-4 of your chemotherapy cycle.    ondansetron  (ZOFRAN-ODT) 8 mg, Oral, Every 8 hours PRN        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        Past Surgical History:   Procedure Laterality Date    DIRECT LARYNGOBRONCHOSCOPY N/A 12/27/2021    Procedure: LARYNGOSCOPY, DIRECT, WITH BRONCHOSCOPY;  Surgeon: Flex Espinosa MD;  [facility];  Service: ENT;  Laterality: N/A;    DISSECTION OF NECK Bilateral 1/6/2022    Procedure: DISSECTION, NECK;  Surgeon: Jesse James MD;  [facility];  Service: ENT;  Laterality: Bilateral;    FLAP PROCEDURE Right 1/6/2022    Procedure: CREATION, FREE FLAP;  Surgeon: Alise Hart MD;  [facility];  Service: ENT;  Laterality: Right;  Ischemic start 1351  Ischemic stop 1502    INSERTION OF TUNNELED CENTRAL VENOUS CATHETER (CVC) WITH SUBCUTANEOUS PORT N/A 6/9/2022    Procedure: [id number];  Surgeon: Jesus Viera MD;  Location: [facility];  Service: General;  Laterality: N/A;    LARYNGECTOMY N/A 1/6/2022    Procedure: LARYNGECTOMY;  Surgeon: Jesse James MD;  Location: [address];  Service: ENT;  Laterality: N/A;    LARYNGOSCOPY N/A 8/4/2020    Procedure: Suspension microlaryngoscopy with biopsy, possible KTP laser treatment/excision;  Surgeon: Stew Noel MD;  Location: [address];  Service: ENT;  Laterality: N/A;  Microscope, telescopes, tower, microinstruments, KTP laser, rep conf# [id number] IC 7/28.    LARYNGOSCOPY N/A 3/16/2021    Procedure: Suspension microlaryngoscopy with excision of lesion, possible CO2 laser;  Surgeon: Stew Noel MD;  Location: [address];  Service: ENT;  Laterality: N/A;  Microscope, telescopes, tower, microinstruments, CO2 laser, rep conf# [id number] IC 3/4.    LARYNGOSCOPY N/A 4/1/2021    Procedure: Suspension microlaryngoscopy  with KTP laser excision of lesion;  Surgeon: Stew Noel MD;  [facility];  Service: ENT;  Laterality: N/A;  Microscope, telescopes, tower, microinstruments, 70 degree scope, vocal fold , KTP laser, rep conf# [id number] BC    LARYNGOSCOPY N/A 12/9/2021    Procedure: Suspension microlaryngoscopy with biopsy;  Surgeon: Stew Noel MD;  [facility];  Service: ENT;  Laterality: N/A;  Microscope, telescopes, tower, microinstruments    LARYNGOSCOPY N/A 1/6/2022    Procedure: LARYNGOSCOPY;  Surgeon: Jesse James MD;  [facility];  Service: ENT;  Laterality: N/A;    LARYNGOSCOPY N/A 4/27/2022    Procedure: LARYNGOSCOPY WITH BIOPSY;  Surgeon: Jesse James MD;  [facility];  Service: ENT;  Laterality: N/A;    MICROLARYNGOSCOPY N/A 3/17/2020    Procedure: MICROLARYNGOSCOPY;  Surgeon: Jung Xiao MD;  Location: [facility];  Service: ENT;  Laterality: N/A;  Laser Microlaryngoscopy  NEED TO SCHEDULE LASER from [facility] [phone]    REIMPLANTATION OF PARATHYROID TISSUE N/A 1/6/2022    Procedure: REIMPLANTATION, PARATHYROID TISSUE;  Surgeon: Jesse James MD;  Location: [facility] OR [address] [place] [address];  Service: ENT;  Laterality: N/A;    THYROIDECTOMY  1/6/2022    Procedure: THYROIDECTOMY;  Surgeon: Jesse James MD;  [facility];  Service: ENT;;    TRACHEOSTOMY N/A 12/27/2021    Procedure: CREATION, TRACHEOSTOMY;  Surgeon: Flex Espinosa MD;  [facility];  Service: ENT;  Laterality: N/A;        Family History   Problem Relation Age of Onset    Abnormal EKG Mother     Diabetes Father     Heart disease Father     Hypertension Father        Social History     Tobacco Use    Smoking status: Never Smoker    Smokeless tobacco: Never Used   Substance Use Topics    Alcohol use: Not Currently     Comment: occasional    Drug use: No         Vitals:    06/22/22 1334   BP: (!) 115/58   Pulse: 86  "  Resp: 20   Temp: 98.1 °F (36.7 °C)        Physical Exam:  BP (!) 115/58 (BP Location: Right arm, Patient Position: Sitting, BP Method: Medium (Automatic))   Pulse 86   Temp 98.1 °F (36.7 °C) (Temporal)   Resp 20   Ht 5' 6" (1.676 m)   Wt 64.5 kg (142 lb 3.2 oz)   SpO2 100%   BMI 22.95 kg/m²     Physical Exam  Vitals and nursing note reviewed.   Constitutional:       Appearance: Normal appearance.   HENT:      Head: Normocephalic and atraumatic.      Nose: Nose normal.      Mouth/Throat:      Mouth: Mucous membranes are moist.      Pharynx: Oropharynx is clear.   Eyes:      Extraocular Movements: Extraocular movements intact.      Conjunctiva/sclera: Conjunctivae normal.   Cardiovascular:      Rate and Rhythm: Normal rate and regular rhythm.      Heart sounds: Normal heart sounds.   Pulmonary:      Effort: Pulmonary effort is normal.      Breath sounds: Normal breath sounds.   Abdominal:      General: Abdomen is flat. Bowel sounds are normal.      Palpations: Abdomen is soft.   Musculoskeletal:         General: Normal range of motion.      Cervical back: Normal range of motion and neck supple.   Skin:     General: Skin is warm and dry.   Neurological:      General: No focal deficit present.      Mental Status: He is alert and oriented to person, place, and time. Mental status is at baseline.   Psychiatric:         Mood and Affect: Mood normal.          Labs:  Lab Results   Component Value Date    WBC 5.73 06/09/2022    RBC 3.34 (L) 06/09/2022    HGB 8.3 (L) 06/09/2022    HCT 27.1 (L) 06/09/2022    MCV 81 (L) 06/09/2022    MCH 24.9 (L) 06/09/2022    MCHC 30.6 (L) 06/09/2022    RDW 14.5 06/09/2022     06/09/2022    MPV 10.3 06/09/2022    GRAN 4.5 06/09/2022    GRAN 79.3 (H) 06/09/2022    LYMPH 0.7 (L) 06/09/2022    LYMPH 11.3 (L) 06/09/2022    MONO 0.4 06/09/2022    MONO 6.3 06/09/2022    EOS 0.2 06/09/2022    BASO 0.01 06/09/2022    EOSINOPHIL 2.6 06/09/2022    BASOPHIL 0.2 06/09/2022     Sodium   Date " Value Ref Range Status   06/10/2022 135 (L) 136 - 145 mmol/L Final     Potassium   Date Value Ref Range Status   06/10/2022 3.9 3.5 - 5.1 mmol/L Final     Chloride   Date Value Ref Range Status   06/10/2022 102 95 - 110 mmol/L Final     CO2   Date Value Ref Range Status   06/10/2022 27 23 - 29 mmol/L Final     Glucose   Date Value Ref Range Status   06/10/2022 126 (H) 70 - 110 mg/dL Final     BUN   Date Value Ref Range Status   06/10/2022 18 8 - 23 mg/dL Final     Creatinine   Date Value Ref Range Status   06/10/2022 0.9 0.5 - 1.4 mg/dL Final     Calcium   Date Value Ref Range Status   06/10/2022 8.6 (L) 8.7 - 10.5 mg/dL Final     Total Protein   Date Value Ref Range Status   06/08/2022 7.3 6.0 - 8.4 g/dL Final     Albumin   Date Value Ref Range Status   06/08/2022 4.2 3.5 - 5.2 g/dL Final   11/18/2020 3.7 3.6 - 5.1 g/dL Final     Comment:     For additional information, please refer to   http://education.[employer]/faq/IQH988 (This link is   being provided for informational/ educational purposes only.)  This test was developed and its analytical performance   characteristics have been determined by [facility]. It has not been cleared or approved by the   [place] Food and Drug Administration. This assay has been validated   pursuant to the CLIA regulations and is used for clinical   purposes.  @ Test Performed By:  [facility]  Yo Cortes M.D.,   [address] [place] [id number]  [place]  [id number]       Total Bilirubin   Date Value Ref Range Status   06/08/2022 1.2 (H) 0.1 - 1.0 mg/dL Final     Comment:     For infants and newborns, interpretation of results should be based  on gestational age, weight and in agreement with clinical  observations.    Premature Infant recommended reference ranges:  Up to 24 hours.............<8.0 mg/dL  Up to 48 hours............<12.0 mg/dL  3-5 days..................<15.0 mg/dL  6-29  days.................<15.0 mg/dL       Alkaline Phosphatase   Date Value Ref Range Status   06/08/2022 73 55 - 135 U/L Final     AST   Date Value Ref Range Status   06/08/2022 16 10 - 40 U/L Final     ALT   Date Value Ref Range Status   06/08/2022 11 10 - 44 U/L Final     Anion Gap   Date Value Ref Range Status   06/10/2022 6 (L) 8 - 16 mmol/L Final     eGFR if    Date Value Ref Range Status   06/10/2022 >60.0 >60 mL/min/1.73 m^2 Final     eGFR if non    Date Value Ref Range Status   06/10/2022 >60.0 >60 mL/min/1.73 m^2 Final     Comment:     Calculation used to obtain the estimated glomerular filtration  rate (eGFR) is the CKD-EPI equation.          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  I will treat him with a combination of chemo plus immunotherapy with carboplatin, 5 FU, and Keytruda.  I will set him up with chemotherapy school.  I have obtained consents.  He already has a port placed.  Hopefully can start treatment ASAP.  Explained to him that all treatment will be palliative in nature patient cannot be cured he understood and wants to move forward.    Aurash Khoobehi, MD  Hematology and Oncology

## 2022-06-23 ENCOUNTER — OFFICE VISIT (OUTPATIENT)
Dept: HEMATOLOGY/ONCOLOGY | Facility: CLINIC | Age: 72
End: 2022-06-23
Payer: MEDICARE

## 2022-06-23 VITALS
TEMPERATURE: 98 F | OXYGEN SATURATION: 98 % | RESPIRATION RATE: 20 BRPM | HEART RATE: 102 BPM | DIASTOLIC BLOOD PRESSURE: 58 MMHG | SYSTOLIC BLOOD PRESSURE: 105 MMHG

## 2022-06-23 DIAGNOSIS — G47.00 INSOMNIA, UNSPECIFIED TYPE: Primary | ICD-10-CM

## 2022-06-23 PROCEDURE — 3066F NEPHROPATHY DOC TX: CPT | Mod: CPTII,S$GLB,, | Performed by: NURSE PRACTITIONER

## 2022-06-23 PROCEDURE — 3060F PR POS MICROALBUMINURIA RESULT DOCUMENTED/REVIEW: ICD-10-PCS | Mod: CPTII,S$GLB,, | Performed by: NURSE PRACTITIONER

## 2022-06-23 PROCEDURE — 1111F DSCHRG MED/CURRENT MED MERGE: CPT | Mod: CPTII,S$GLB,, | Performed by: NURSE PRACTITIONER

## 2022-06-23 PROCEDURE — 3066F PR DOCUMENTATION OF TREATMENT FOR NEPHROPATHY: ICD-10-PCS | Mod: CPTII,S$GLB,, | Performed by: NURSE PRACTITIONER

## 2022-06-23 PROCEDURE — 1159F PR MEDICATION LIST DOCUMENTED IN MEDICAL RECORD: ICD-10-PCS | Mod: CPTII,S$GLB,, | Performed by: NURSE PRACTITIONER

## 2022-06-23 PROCEDURE — 1159F MED LIST DOCD IN RCRD: CPT | Mod: CPTII,S$GLB,, | Performed by: NURSE PRACTITIONER

## 2022-06-23 PROCEDURE — 99999 PR PBB SHADOW E&M-EST. PATIENT-LVL IV: ICD-10-PCS | Mod: PBBFAC,,, | Performed by: NURSE PRACTITIONER

## 2022-06-23 PROCEDURE — 3044F PR MOST RECENT HEMOGLOBIN A1C LEVEL <7.0%: ICD-10-PCS | Mod: CPTII,S$GLB,, | Performed by: NURSE PRACTITIONER

## 2022-06-23 PROCEDURE — 3078F PR MOST RECENT DIASTOLIC BLOOD PRESSURE < 80 MM HG: ICD-10-PCS | Mod: CPTII,S$GLB,, | Performed by: NURSE PRACTITIONER

## 2022-06-23 PROCEDURE — 4010F PR ACE/ARB THEARPY RXD/TAKEN: ICD-10-PCS | Mod: CPTII,S$GLB,, | Performed by: NURSE PRACTITIONER

## 2022-06-23 PROCEDURE — 3074F PR MOST RECENT SYSTOLIC BLOOD PRESSURE < 130 MM HG: ICD-10-PCS | Mod: CPTII,S$GLB,, | Performed by: NURSE PRACTITIONER

## 2022-06-23 PROCEDURE — 3060F POS MICROALBUMINURIA REV: CPT | Mod: CPTII,S$GLB,, | Performed by: NURSE PRACTITIONER

## 2022-06-23 PROCEDURE — 99214 PR OFFICE/OUTPT VISIT, EST, LEVL IV, 30-39 MIN: ICD-10-PCS | Mod: S$GLB,,, | Performed by: NURSE PRACTITIONER

## 2022-06-23 PROCEDURE — 3078F DIAST BP <80 MM HG: CPT | Mod: CPTII,S$GLB,, | Performed by: NURSE PRACTITIONER

## 2022-06-23 PROCEDURE — 1160F RVW MEDS BY RX/DR IN RCRD: CPT | Mod: CPTII,S$GLB,, | Performed by: NURSE PRACTITIONER

## 2022-06-23 PROCEDURE — 99999 PR PBB SHADOW E&M-EST. PATIENT-LVL IV: CPT | Mod: PBBFAC,,, | Performed by: NURSE PRACTITIONER

## 2022-06-23 PROCEDURE — 1111F PR DISCHARGE MEDS RECONCILED W/ CURRENT OUTPATIENT MED LIST: ICD-10-PCS | Mod: CPTII,S$GLB,, | Performed by: NURSE PRACTITIONER

## 2022-06-23 PROCEDURE — 4010F ACE/ARB THERAPY RXD/TAKEN: CPT | Mod: CPTII,S$GLB,, | Performed by: NURSE PRACTITIONER

## 2022-06-23 PROCEDURE — 3074F SYST BP LT 130 MM HG: CPT | Mod: CPTII,S$GLB,, | Performed by: NURSE PRACTITIONER

## 2022-06-23 PROCEDURE — 1160F PR REVIEW ALL MEDS BY PRESCRIBER/CLIN PHARMACIST DOCUMENTED: ICD-10-PCS | Mod: CPTII,S$GLB,, | Performed by: NURSE PRACTITIONER

## 2022-06-23 PROCEDURE — 99214 OFFICE O/P EST MOD 30 MIN: CPT | Mod: S$GLB,,, | Performed by: NURSE PRACTITIONER

## 2022-06-23 PROCEDURE — 3044F HG A1C LEVEL LT 7.0%: CPT | Mod: CPTII,S$GLB,, | Performed by: NURSE PRACTITIONER

## 2022-06-23 PROCEDURE — 1126F PR PAIN SEVERITY QUANTIFIED, NO PAIN PRESENT: ICD-10-PCS | Mod: CPTII,S$GLB,, | Performed by: NURSE PRACTITIONER

## 2022-06-23 PROCEDURE — 1126F AMNT PAIN NOTED NONE PRSNT: CPT | Mod: CPTII,S$GLB,, | Performed by: NURSE PRACTITIONER

## 2022-06-23 RX ORDER — TRAZODONE HYDROCHLORIDE 50 MG/1
50 TABLET ORAL NIGHTLY PRN
Qty: 30 TABLET | Refills: 2 | Status: SHIPPED | OUTPATIENT
Start: 2022-06-23 | End: 2022-09-19

## 2022-06-23 NOTE — PROGRESS NOTES
Service Date:  6/23/22    Chief Complaint: No chief complaint on file.    Cyrus Batres Jr. is a 71 y.o. male referred here by Dr. Noel for laryngeal cancer.      Oncological history is as followed:  10/30/2020: completion of IMRT to larynx and bilateral necks via SiB totaling 6996 cGy.  Diagnosed as a T2 N0 M0 lesion at that time, stage II.   3/16/2021: SML left VF resection encompassing AC; margins negative on frozen, some margins positive on permanent  4/1/2021: SML KTP left transmuscular cordectomy, anterior right sublig resection encompassing AC; left TVF clear with negative margin;  right TVF frozen margins negative but positive on permanent  5/6/2021: SML KTP transmusc resection anterior right TVF; negative for carcinoma  1/6/2022: s/p Total laryngectomy; T4aN0 +PNI; no adjuvant radiation therapy or systemic therapy given    Now with malignancy at the base of the tongue [id number]. Negative mets on PET scan. Patient is not a candidate for further radiation therapy and does not want surgery as he would need a glossectomy.     6/23/22:  Patient presents today for chemotherapy education.  Patient and wife would like 2nd opinion with head and neck oncologist at [facility].  His only complaint today is insomnia  Review of Systems   Constitutional: Negative.    HENT: Negative.    Eyes: Negative.    Respiratory: Negative.    Cardiovascular: Negative.    Gastrointestinal: Negative.    Endocrine: Negative.    Genitourinary: Negative.    Neurological: Negative.    Hematological: Negative.    Psychiatric/Behavioral: Negative.         Current Outpatient Medications   Medication Instructions    acetaminophen (TYLENOL) 325 mg, Oral, Every 6 hours PRN    dexAMETHasone (DECADRON) 8 mg, Oral, Daily, Take as directed on days 2, 3, and 4 of your chemotherapy cycle.    diphenhydrAMINE (BENADRYL) 25 mg, Oral, Every 6 hours PRN    esomeprazole (NEXIUM) 40 mg, Oral, Before breakfast    levothyroxine (SYNTHROID) 100 mcg, Oral,  Before breakfast    losartan (COZAAR) 100 MG tablet TAKE 1 TABLET BY MOUTH EVERY DAY    OLANZapine (ZYPREXA) 5 mg, Oral, Nightly, Take as directed on days 1-4 of your chemotherapy cycle.    ondansetron (ZOFRAN-ODT) 8 mg, Oral, Every 8 hours PRN        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        Past Surgical History:   Procedure Laterality Date    DIRECT LARYNGOBRONCHOSCOPY N/A 12/27/2021    Procedure: LARYNGOSCOPY, DIRECT, WITH BRONCHOSCOPY;  Surgeon: Flex Espinosa MD;  Location: [facility] OR [address] [place] [address];  Service: ENT;  Laterality: N/A;    DISSECTION OF NECK Bilateral 1/6/2022    Procedure: DISSECTION, NECK;  Surgeon: Jesse James MD;  Location: [address];  Service: ENT;  Laterality: Bilateral;    FLAP PROCEDURE Right 1/6/2022    Procedure: CREATION, FREE FLAP;  Surgeon: Alise Hart MD;  Location: [address];  Service: ENT;  Laterality: Right;  Ischemic start 1351  Ischemic stop 1502    INSERTION OF TUNNELED CENTRAL VENOUS CATHETER (CVC) WITH SUBCUTANEOUS PORT N/A 6/9/2022    Procedure: [id number];  Surgeon: Jesus Viera MD;  Location: [facility];  Service: General;  Laterality: N/A;    LARYNGECTOMY N/A 1/6/2022    Procedure: LARYNGECTOMY;  Surgeon: Jesse James MD;  Location: [address];  Service: ENT;  Laterality: N/A;    LARYNGOSCOPY N/A 8/4/2020    Procedure: Suspension microlaryngoscopy with biopsy, possible KTP laser treatment/excision;  Surgeon: Stew Noel MD;  [address];  Service: ENT;  Laterality: N/A;  Microscope, telescopes, tower, microinstruments, KTP laser, rep conf# [id number] IC 7/28.    LARYNGOSCOPY N/A 3/16/2021    Procedure: Suspension microlaryngoscopy with excision of lesion, possible CO2 laser;  Surgeon: Stew Noel MD;  Location: [facility]  2ND FLR;  Service: ENT;  Laterality: N/A;  Microscope, telescopes, tower, microinstruments, CO2 laser, rep conf# [id number] IC 3/4.    LARYNGOSCOPY N/A 4/1/2021    Procedure: Suspension microlaryngoscopy with KTP laser excision of lesion;  Surgeon: Stew Noel MD;  [facility];  Service: ENT;  Laterality: N/A;  Microscope, telescopes, tower, microinstruments, 70 degree scope, vocal fold , KTP laser, rep conf# [id number] BC    LARYNGOSCOPY N/A 12/9/2021    Procedure: Suspension microlaryngoscopy with biopsy;  Surgeon: Stew Noel MD;  [facility];  Service: ENT;  Laterality: N/A;  Microscope, telescopes, tower, microinstruments    LARYNGOSCOPY N/A 1/6/2022    Procedure: LARYNGOSCOPY;  Surgeon: Jesse James MD;  [facility];  Service: ENT;  Laterality: N/A;    LARYNGOSCOPY N/A 4/27/2022    Procedure: LARYNGOSCOPY WITH BIOPSY;  Surgeon: Jesse James MD;  [facility];  Service: ENT;  Laterality: N/A;    MICROLARYNGOSCOPY N/A 3/17/2020    Procedure: MICROLARYNGOSCOPY;  Surgeon: Jung Xiao MD;  Location: [facility] OR;  Service: ENT;  Laterality: N/A;  Laser Microlaryngoscopy  NEED TO SCHEDULE LASER from [facility] [id number]    REIMPLANTATION OF PARATHYROID TISSUE N/A 1/6/2022    Procedure: REIMPLANTATION, PARATHYROID TISSUE;  Surgeon: Jesse James MD;  [facility];  Service: ENT;  Laterality: N/A;    THYROIDECTOMY  1/6/2022    Procedure: THYROIDECTOMY;  Surgeon: Jesse James MD;  [facility];  Service: ENT;;    TRACHEOSTOMY N/A 12/27/2021    Procedure: CREATION, TRACHEOSTOMY;  Surgeon: Flex Espinosa MD;  Location: [facility] FLR;  Service: ENT;  Laterality: N/A;        Family History   Problem Relation Age of Onset    Abnormal EKG Mother     Diabetes Father     Heart disease Father     Hypertension Father        Social History     Tobacco Use    Smoking status: Never  Smoker    Smokeless tobacco: Never Used   Substance Use Topics    Alcohol use: Not Currently     Comment: occasional    Drug use: No             Physical Exam:  Vitals:    06/23/22 1555   BP: (!) 105/58   BP Location: Right arm   Patient Position: Sitting   BP Method: Medium (Automatic)   Pulse: 102   Resp: 20   Temp: 97.7 °F (36.5 °C)   TempSrc: Temporal   SpO2: 98%     Physical Exam  Vitals and nursing note reviewed.   Constitutional:       Appearance: Normal appearance.   HENT:      Head: Normocephalic and atraumatic.      Nose: Nose normal.      Mouth/Throat:      Mouth: Mucous membranes are moist.      Pharynx: Oropharynx is clear.   Eyes:      Extraocular Movements: Extraocular movements intact.      Conjunctiva/sclera: Conjunctivae normal.   Cardiovascular:      Rate and Rhythm: Normal rate and regular rhythm.      Heart sounds: Normal heart sounds.   Pulmonary:      Effort: Pulmonary effort is normal.      Breath sounds: Normal breath sounds.   Abdominal:      General: Abdomen is flat. Bowel sounds are normal.      Palpations: Abdomen is soft.   Musculoskeletal:         General: Normal range of motion.      Cervical back: Normal range of motion and neck supple.   Skin:     General: Skin is warm and dry.   Neurological:      General: No focal deficit present.      Mental Status: He is alert and oriented to person, place, and time. Mental status is at baseline.   Psychiatric:         Mood and Affect: Mood normal.          Labs:  Lab Results   Component Value Date    WBC 5.73 06/09/2022    RBC 3.34 (L) 06/09/2022    HGB 8.3 (L) 06/09/2022    HCT 27.1 (L) 06/09/2022    MCV 81 (L) 06/09/2022    MCH 24.9 (L) 06/09/2022    MCHC 30.6 (L) 06/09/2022    RDW 14.5 06/09/2022     06/09/2022    MPV 10.3 06/09/2022    GRAN 4.5 06/09/2022    GRAN 79.3 (H) 06/09/2022    LYMPH 0.7 (L) 06/09/2022    LYMPH 11.3 (L) 06/09/2022    MONO 0.4 06/09/2022    MONO 6.3 06/09/2022    EOS 0.2 06/09/2022    BASO 0.01 06/09/2022     EOSINOPHIL 2.6 06/09/2022    BASOPHIL 0.2 06/09/2022     Sodium   Date Value Ref Range Status   06/10/2022 135 (L) 136 - 145 mmol/L Final     Potassium   Date Value Ref Range Status   06/10/2022 3.9 3.5 - 5.1 mmol/L Final     Chloride   Date Value Ref Range Status   06/10/2022 102 95 - 110 mmol/L Final     CO2   Date Value Ref Range Status   06/10/2022 27 23 - 29 mmol/L Final     Glucose   Date Value Ref Range Status   06/10/2022 126 (H) 70 - 110 mg/dL Final     BUN   Date Value Ref Range Status   06/10/2022 18 8 - 23 mg/dL Final     Creatinine   Date Value Ref Range Status   06/10/2022 0.9 0.5 - 1.4 mg/dL Final     Calcium   Date Value Ref Range Status   06/10/2022 8.6 (L) 8.7 - 10.5 mg/dL Final     Total Protein   Date Value Ref Range Status   06/08/2022 7.3 6.0 - 8.4 g/dL Final     Albumin   Date Value Ref Range Status   06/08/2022 4.2 3.5 - 5.2 g/dL Final   11/18/2020 3.7 3.6 - 5.1 g/dL Final     Comment:     For additional information, please refer to   http://education.[employer]/faq/AUQ168 (This link is   being provided for informational/ educational purposes only.)  This test was developed and its analytical performance   characteristics have been determined by [facility]. It has not been cleared or approved by the   [place] Food and Drug Administration. This assay has been validated   pursuant to the CLIA regulations and is used for clinical   purposes.  @ Test Performed By:  [facility]  oY Cortes M.D.,   [address] [place] [id number]  CLIA  [id number]       Total Bilirubin   Date Value Ref Range Status   06/08/2022 1.2 (H) 0.1 - 1.0 mg/dL Final     Comment:     For infants and newborns, interpretation of results should be based  on gestational age, weight and in agreement with clinical  observations.    Premature Infant recommended reference ranges:  Up to 24 hours.............<8.0 mg/dL  Up to 48  hours............<12.0 mg/dL  3-5 days..................<15.0 mg/dL  6-29 days.................<15.0 mg/dL       Alkaline Phosphatase   Date Value Ref Range Status   06/08/2022 73 55 - 135 U/L Final     AST   Date Value Ref Range Status   06/08/2022 16 10 - 40 U/L Final     ALT   Date Value Ref Range Status   06/08/2022 11 10 - 44 U/L Final     Anion Gap   Date Value Ref Range Status   06/10/2022 6 (L) 8 - 16 mmol/L Final     eGFR if    Date Value Ref Range Status   06/10/2022 >60.0 >60 mL/min/1.73 m^2 Final     eGFR if non    Date Value Ref Range Status   06/10/2022 >60.0 >60 mL/min/1.73 m^2 Final     Comment:     Calculation used to obtain the estimated glomerular filtration  rate (eGFR) is the CKD-EPI equation.          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   - will refer patient to Head and neck oncologist at [facility] for 2nd opinion    Chemotherapy education:  -I met with the patient today for  chemotherapy education.  Patient will be starting treatment with carboplatin/5FU/Keytruda from its base of tongue cancer.  We discussed the mechanism of action, potential side effects of this treatment as well as ways he can manage them at home.  Some of the side effects include but are not limited to fever, nausea, vomiting, decreased appetite, fatigue, weakness, cytopenias, myalgia/arthralgia, constipation, diarrhea, bleeding, headache, shortness of breath, nail changes, takes change, hair thinning/loss, mood disturbances, or edema.  I explained to patient that we will obtain labs prior to treat the patient will follow-up with a physician or nurse practitioner for toxicity monitoring throughout treatment.  Chemo care doc, education was verbalized with patient regarding each chemotherapy drug.  Patient was instructed to call any time 24/7 because there is a physician call for any problems that may arise.  Patient was also instructed to report to [facility] if they cannot get in touch with a physician after hours.  The patient acknowledged full understanding of the risk, recommendations and plan.  He understands risks versus benefits of therapy.  I have answered all of the patient's questions and concerns.  -written instructions provided at today's visit      nausea related to chemotherapy:  zyprexa  as directed on days 1-4 of your chemotherapy cycle  Zofran 8 mg every 6 hours as needed    Nutrition:  Educated on the importance of maintaining healthy diet with adequate hydration    Malignancy related pain:  He denies pain at this moment  Will notify us if pain develops      Hypothyroidism:  Continue current medication regimen  Will monitor thyroid function carefully    Insomnia:  Trazodone p.o. 50 mg q.h.s. as needed

## 2022-06-24 DIAGNOSIS — C01 PRIMARY CANCER OF BASE OF TONGUE: Primary | ICD-10-CM

## 2022-06-27 ENCOUNTER — TELEPHONE (OUTPATIENT)
Dept: HEMATOLOGY/ONCOLOGY | Facility: CLINIC | Age: 72
End: 2022-06-27
Payer: MEDICARE

## 2022-06-27 NOTE — TELEPHONE ENCOUNTER
Received referral for pt, he wishes to have a 2nd opinion for base of tongue cancer. Called & spoke with wife, arranged appt on 7-7-22 with .

## 2022-06-30 ENCOUNTER — TELEPHONE (OUTPATIENT)
Dept: HEMATOLOGY/ONCOLOGY | Facility: CLINIC | Age: 72
End: 2022-06-30
Payer: MEDICARE

## 2022-06-30 DIAGNOSIS — C32.0 MALIGNANT NEOPLASM OF TRUE VOCAL CORD: Primary | ICD-10-CM

## 2022-07-07 ENCOUNTER — OFFICE VISIT (OUTPATIENT)
Dept: HEMATOLOGY/ONCOLOGY | Facility: CLINIC | Age: 72
End: 2022-07-07
Payer: MEDICARE

## 2022-07-07 VITALS
HEART RATE: 92 BPM | RESPIRATION RATE: 16 BRPM | BODY MASS INDEX: 22.26 KG/M2 | DIASTOLIC BLOOD PRESSURE: 63 MMHG | OXYGEN SATURATION: 99 % | HEIGHT: 66 IN | SYSTOLIC BLOOD PRESSURE: 102 MMHG

## 2022-07-07 DIAGNOSIS — C01 PRIMARY CANCER OF BASE OF TONGUE: Primary | ICD-10-CM

## 2022-07-07 DIAGNOSIS — Z90.02 S/P LARYNGECTOMY: ICD-10-CM

## 2022-07-07 DIAGNOSIS — D50.0 IRON DEFICIENCY ANEMIA DUE TO CHRONIC BLOOD LOSS: ICD-10-CM

## 2022-07-07 DIAGNOSIS — C32.9 LARYNX CANCER: ICD-10-CM

## 2022-07-07 DIAGNOSIS — E89.0 POSTOPERATIVE HYPOTHYROIDISM: ICD-10-CM

## 2022-07-07 DIAGNOSIS — K13.79 ORAL BLEEDING: ICD-10-CM

## 2022-07-07 PROCEDURE — 1101F PT FALLS ASSESS-DOCD LE1/YR: CPT | Mod: CPTII,S$GLB,, | Performed by: STUDENT IN AN ORGANIZED HEALTH CARE EDUCATION/TRAINING PROGRAM

## 2022-07-07 PROCEDURE — 1126F AMNT PAIN NOTED NONE PRSNT: CPT | Mod: CPTII,S$GLB,, | Performed by: STUDENT IN AN ORGANIZED HEALTH CARE EDUCATION/TRAINING PROGRAM

## 2022-07-07 PROCEDURE — 3288F FALL RISK ASSESSMENT DOCD: CPT | Mod: CPTII,S$GLB,, | Performed by: STUDENT IN AN ORGANIZED HEALTH CARE EDUCATION/TRAINING PROGRAM

## 2022-07-07 PROCEDURE — 3066F PR DOCUMENTATION OF TREATMENT FOR NEPHROPATHY: ICD-10-PCS | Mod: CPTII,S$GLB,, | Performed by: STUDENT IN AN ORGANIZED HEALTH CARE EDUCATION/TRAINING PROGRAM

## 2022-07-07 PROCEDURE — 3078F DIAST BP <80 MM HG: CPT | Mod: CPTII,S$GLB,, | Performed by: STUDENT IN AN ORGANIZED HEALTH CARE EDUCATION/TRAINING PROGRAM

## 2022-07-07 PROCEDURE — 3060F POS MICROALBUMINURIA REV: CPT | Mod: CPTII,S$GLB,, | Performed by: STUDENT IN AN ORGANIZED HEALTH CARE EDUCATION/TRAINING PROGRAM

## 2022-07-07 PROCEDURE — 3044F HG A1C LEVEL LT 7.0%: CPT | Mod: CPTII,S$GLB,, | Performed by: STUDENT IN AN ORGANIZED HEALTH CARE EDUCATION/TRAINING PROGRAM

## 2022-07-07 PROCEDURE — 1111F PR DISCHARGE MEDS RECONCILED W/ CURRENT OUTPATIENT MED LIST: ICD-10-PCS | Mod: CPTII,S$GLB,, | Performed by: STUDENT IN AN ORGANIZED HEALTH CARE EDUCATION/TRAINING PROGRAM

## 2022-07-07 PROCEDURE — 4010F PR ACE/ARB THEARPY RXD/TAKEN: ICD-10-PCS | Mod: CPTII,S$GLB,, | Performed by: STUDENT IN AN ORGANIZED HEALTH CARE EDUCATION/TRAINING PROGRAM

## 2022-07-07 PROCEDURE — 99417 PR PROLONGED SVC, OUTPT, W/WO DIRECT PT CONTACT,  EA ADDTL 15 MIN: ICD-10-PCS | Mod: S$GLB,,, | Performed by: STUDENT IN AN ORGANIZED HEALTH CARE EDUCATION/TRAINING PROGRAM

## 2022-07-07 PROCEDURE — 1159F MED LIST DOCD IN RCRD: CPT | Mod: CPTII,S$GLB,, | Performed by: STUDENT IN AN ORGANIZED HEALTH CARE EDUCATION/TRAINING PROGRAM

## 2022-07-07 PROCEDURE — 99499 UNLISTED E&M SERVICE: CPT | Mod: S$GLB,,, | Performed by: STUDENT IN AN ORGANIZED HEALTH CARE EDUCATION/TRAINING PROGRAM

## 2022-07-07 PROCEDURE — 3066F NEPHROPATHY DOC TX: CPT | Mod: CPTII,S$GLB,, | Performed by: STUDENT IN AN ORGANIZED HEALTH CARE EDUCATION/TRAINING PROGRAM

## 2022-07-07 PROCEDURE — 3008F PR BODY MASS INDEX (BMI) DOCUMENTED: ICD-10-PCS | Mod: CPTII,S$GLB,, | Performed by: STUDENT IN AN ORGANIZED HEALTH CARE EDUCATION/TRAINING PROGRAM

## 2022-07-07 PROCEDURE — 3074F PR MOST RECENT SYSTOLIC BLOOD PRESSURE < 130 MM HG: ICD-10-PCS | Mod: CPTII,S$GLB,, | Performed by: STUDENT IN AN ORGANIZED HEALTH CARE EDUCATION/TRAINING PROGRAM

## 2022-07-07 PROCEDURE — 1101F PR PT FALLS ASSESS DOC 0-1 FALLS W/OUT INJ PAST YR: ICD-10-PCS | Mod: CPTII,S$GLB,, | Performed by: STUDENT IN AN ORGANIZED HEALTH CARE EDUCATION/TRAINING PROGRAM

## 2022-07-07 PROCEDURE — 99999 PR PBB SHADOW E&M-EST. PATIENT-LVL III: CPT | Mod: PBBFAC,,, | Performed by: STUDENT IN AN ORGANIZED HEALTH CARE EDUCATION/TRAINING PROGRAM

## 2022-07-07 PROCEDURE — 99499 RISK ADDL DX/OHS AUDIT: ICD-10-PCS | Mod: S$GLB,,, | Performed by: STUDENT IN AN ORGANIZED HEALTH CARE EDUCATION/TRAINING PROGRAM

## 2022-07-07 PROCEDURE — 1159F PR MEDICATION LIST DOCUMENTED IN MEDICAL RECORD: ICD-10-PCS | Mod: CPTII,S$GLB,, | Performed by: STUDENT IN AN ORGANIZED HEALTH CARE EDUCATION/TRAINING PROGRAM

## 2022-07-07 PROCEDURE — 3060F PR POS MICROALBUMINURIA RESULT DOCUMENTED/REVIEW: ICD-10-PCS | Mod: CPTII,S$GLB,, | Performed by: STUDENT IN AN ORGANIZED HEALTH CARE EDUCATION/TRAINING PROGRAM

## 2022-07-07 PROCEDURE — 3074F SYST BP LT 130 MM HG: CPT | Mod: CPTII,S$GLB,, | Performed by: STUDENT IN AN ORGANIZED HEALTH CARE EDUCATION/TRAINING PROGRAM

## 2022-07-07 PROCEDURE — 4010F ACE/ARB THERAPY RXD/TAKEN: CPT | Mod: CPTII,S$GLB,, | Performed by: STUDENT IN AN ORGANIZED HEALTH CARE EDUCATION/TRAINING PROGRAM

## 2022-07-07 PROCEDURE — 3008F BODY MASS INDEX DOCD: CPT | Mod: CPTII,S$GLB,, | Performed by: STUDENT IN AN ORGANIZED HEALTH CARE EDUCATION/TRAINING PROGRAM

## 2022-07-07 PROCEDURE — 99417 PROLNG OP E/M EACH 15 MIN: CPT | Mod: S$GLB,,, | Performed by: STUDENT IN AN ORGANIZED HEALTH CARE EDUCATION/TRAINING PROGRAM

## 2022-07-07 PROCEDURE — 3044F PR MOST RECENT HEMOGLOBIN A1C LEVEL <7.0%: ICD-10-PCS | Mod: CPTII,S$GLB,, | Performed by: STUDENT IN AN ORGANIZED HEALTH CARE EDUCATION/TRAINING PROGRAM

## 2022-07-07 PROCEDURE — 99215 OFFICE O/P EST HI 40 MIN: CPT | Mod: S$GLB,,, | Performed by: STUDENT IN AN ORGANIZED HEALTH CARE EDUCATION/TRAINING PROGRAM

## 2022-07-07 PROCEDURE — 3078F PR MOST RECENT DIASTOLIC BLOOD PRESSURE < 80 MM HG: ICD-10-PCS | Mod: CPTII,S$GLB,, | Performed by: STUDENT IN AN ORGANIZED HEALTH CARE EDUCATION/TRAINING PROGRAM

## 2022-07-07 PROCEDURE — 3288F PR FALLS RISK ASSESSMENT DOCUMENTED: ICD-10-PCS | Mod: CPTII,S$GLB,, | Performed by: STUDENT IN AN ORGANIZED HEALTH CARE EDUCATION/TRAINING PROGRAM

## 2022-07-07 PROCEDURE — 1126F PR PAIN SEVERITY QUANTIFIED, NO PAIN PRESENT: ICD-10-PCS | Mod: CPTII,S$GLB,, | Performed by: STUDENT IN AN ORGANIZED HEALTH CARE EDUCATION/TRAINING PROGRAM

## 2022-07-07 PROCEDURE — 99999 PR PBB SHADOW E&M-EST. PATIENT-LVL III: ICD-10-PCS | Mod: PBBFAC,,, | Performed by: STUDENT IN AN ORGANIZED HEALTH CARE EDUCATION/TRAINING PROGRAM

## 2022-07-07 PROCEDURE — 1111F DSCHRG MED/CURRENT MED MERGE: CPT | Mod: CPTII,S$GLB,, | Performed by: STUDENT IN AN ORGANIZED HEALTH CARE EDUCATION/TRAINING PROGRAM

## 2022-07-07 PROCEDURE — 99215 PR OFFICE/OUTPT VISIT, EST, LEVL V, 40-54 MIN: ICD-10-PCS | Mod: S$GLB,,, | Performed by: STUDENT IN AN ORGANIZED HEALTH CARE EDUCATION/TRAINING PROGRAM

## 2022-07-07 RX ORDER — SODIUM CHLORIDE 0.9 % (FLUSH) 0.9 %
10 SYRINGE (ML) INJECTION
Status: CANCELLED | OUTPATIENT
Start: 2022-07-18

## 2022-07-07 RX ORDER — HEPARIN 100 UNIT/ML
500 SYRINGE INTRAVENOUS
Status: CANCELLED | OUTPATIENT
Start: 2022-07-18

## 2022-07-07 RX ORDER — HEPARIN 100 UNIT/ML
500 SYRINGE INTRAVENOUS
Status: CANCELLED | OUTPATIENT
Start: 2022-07-11

## 2022-07-07 RX ORDER — SODIUM CHLORIDE 0.9 % (FLUSH) 0.9 %
10 SYRINGE (ML) INJECTION
Status: CANCELLED | OUTPATIENT
Start: 2022-07-11

## 2022-07-07 NOTE — ASSESSMENT & PLAN NOTE
Currently on levothyroxine, last TSH/fT4 5/2022  -monitor tsh while on immunotherapy  -continue levothyroxine

## 2022-07-07 NOTE — PROGRESS NOTES
PATIENT: Cyrus Batres Jr.  MRN: [id number]  DATE: 7/7/2022      Diagnosis:   1. Primary cancer of base of tongue    2. Larynx cancer    3. Iron deficiency anemia due to chronic blood loss    4. Postoperative hypothyroidism    5. S/P laryngectomy    6. Oral bleeding        Chief Complaint: Consult      Oncologic History:    Oncologic History 1. Squamous cell carcinoma of larynx  2. Squamous cell carcinoma of base of tongue    Oncologic Treatment 1A. 9/16/20-10/30/20 RT to glottis  1B. 1/6/22 TL    Pathology 1. 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 Batres is here for new patient consultation.    71 year old man with history of laryngeal carcinoma s/p definitive radiation with recurrence s/p TL. He was recently diagnosed with sq.c.c of base of tongue.  CT imaging negative for metastasis.  Surgery is an option, which he declined.  He is here for second opinion to discuss the option of palliative systemic therapy.  He reports dysphagia (solids and liquids) and fatigue.  He has intermittent oral bleeding sometimes exacerbated when he tries to eat certain solids that can be sharp.  He is peg tube dependent  He is independent with ADLs and ambulates without a walker.  His wife accompanies him at this visit.    Past Medical History:   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    Postoperative hypothyroidism 7/7/2022       Past Surgical HIstory:   Past Surgical History:   Procedure Laterality Date    DIRECT LARYNGOBRONCHOSCOPY N/A 12/27/2021    Procedure: LARYNGOSCOPY, DIRECT, WITH BRONCHOSCOPY;  Surgeon: Flex Espniosa MD;  Location: [facility] OR [address] [place] [address];  Service: ENT;  Laterality: N/A;    DISSECTION OF NECK Bilateral 1/6/2022    Procedure: DISSECTION, NECK;   Surgeon: Jesse James MD;  [facility];  Service: ENT;  Laterality: Bilateral;    FLAP PROCEDURE Right 1/6/2022    Procedure: CREATION, FREE FLAP;  Surgeon: Alise Hart MD;  [facility];  Service: ENT;  Laterality: Right;  Ischemic start 1351  Ischemic stop 1502    INSERTION OF TUNNELED CENTRAL VENOUS CATHETER (CVC) WITH SUBCUTANEOUS PORT N/A 6/9/2022    Procedure: [id number];  Surgeon: Jesus Viera MD;  Location: [facility] OR;  Service: General;  Laterality: N/A;    LARYNGECTOMY N/A 1/6/2022    Procedure: LARYNGECTOMY;  Surgeon: Jesse James MD;  Location: [facility] OR [address] [place] [address];  Service: ENT;  Laterality: N/A;    LARYNGOSCOPY N/A 8/4/2020    Procedure: Suspension microlaryngoscopy with biopsy, possible KTP laser treatment/excision;  Surgeon: Stew Noel MD;  Location: [address];  Service: ENT;  Laterality: N/A;  Microscope, telescopes, tower, microinstruments, KTP laser, rep conf# [id number] IC 7/28.    LARYNGOSCOPY N/A 3/16/2021    Procedure: Suspension microlaryngoscopy with excision of lesion, possible CO2 laser;  Surgeon: Stew Noel MD;  Location: [address];  Service: ENT;  Laterality: N/A;  Microscope, telescopes, tower, microinstruments, CO2 laser, rep conf# [id number] IC 3/4.    LARYNGOSCOPY N/A 4/1/2021    Procedure: Suspension microlaryngoscopy with KTP laser excision of lesion;  Surgeon: Stew Noel MD;  [facility];  Service: ENT;  Laterality: N/A;  Microscope, telescopes, tower, microinstruments, 70 degree scope, vocal fold , KTP laser, rep conf# [id number] BC    LARYNGOSCOPY N/A 12/9/2021    Procedure: Suspension microlaryngoscopy with biopsy;  Surgeon: Stew Noel MD;  Location: [facility] OR [address] [place] [address];  Service: ENT;  Laterality: N/A;  Microscope, telescopes, tower, microinstruments    LARYNGOSCOPY N/A 1/6/2022    Procedure: LARYNGOSCOPY;  Surgeon: Jesse James MD;  Location: [facility]  OR 2ND FLR;  Service: ENT;  Laterality: N/A;    LARYNGOSCOPY N/A 4/27/2022    Procedure: LARYNGOSCOPY WITH BIOPSY;  Surgeon: Jesse James MD;  Location: [facility] OR 2ND FLR;  Service: ENT;  Laterality: N/A;    MICROLARYNGOSCOPY N/A 3/17/2020    Procedure: MICROLARYNGOSCOPY;  Surgeon: Jung Xiao MD;  Location: [facility] OR;  Service: ENT;  Laterality: N/A;  Laser Microlaryngoscopy  NEED TO SCHEDULE LASER from [email or web address] [id number]    REIMPLANTATION OF PARATHYROID TISSUE N/A 1/6/2022    Procedure: REIMPLANTATION, PARATHYROID TISSUE;  Surgeon: Jesse James MD;  Location: [facility] OR 2ND FLR;  Service: ENT;  Laterality: N/A;    THYROIDECTOMY  1/6/2022    Procedure: THYROIDECTOMY;  Surgeon: Jesse James MD;  Location: [facility] OR [facility] FLR;  Service: ENT;;    TRACHEOSTOMY N/A 12/27/2021    Procedure: CREATION, TRACHEOSTOMY;  Surgeon: Flex Espinosa MD;  Location: [facility] OR [facility] FLR;  Service: ENT;  Laterality: N/A;       Family History:   Family History   Problem Relation Age of Onset    Abnormal EKG Mother     Diabetes Father     Heart disease Father     Hypertension Father        Social History:  reports that he has never smoked. He has never used smokeless tobacco. He reports previous alcohol use. He reports that he does not use drugs.    Allergies:  Review of patient's allergies indicates:   Allergen Reactions    Pollen extracts     Lovastatin Rash     Not confirmed but pt skeptical       Medications:  Current Outpatient Medications   Medication Sig Dispense Refill    acetaminophen (TYLENOL) 325 MG tablet Take 325 mg by mouth every 6 (six) hours as needed for Pain.      diphenhydrAMINE (BENADRYL) 25 mg capsule Take 25 mg by mouth every 6 (six) hours as needed for Itching.      esomeprazole (NEXIUM) 40 MG capsule Take 40 mg by mouth before breakfast.      levothyroxine (SYNTHROID) 100 MCG tablet Take 1 tablet (100 mcg total) by mouth before breakfast. 30 tablet 11    losartan (COZAAR) 100  MG tablet TAKE 1 TABLET BY MOUTH EVERY DAY (Patient taking differently: Take 100 mg by mouth every evening.) 90 tablet 3    traZODone (DESYREL) 50 MG tablet Take 1 tablet (50 mg total) by mouth nightly as needed for Insomnia. 30 tablet 2    dexAMETHasone (DECADRON) 4 MG Tab Take 2 tablets (8 mg total) by mouth once daily. Take as directed on days 2, 3, and 4 of your chemotherapy cycle. (Patient not taking: No sig reported) 24 tablet 2    OLANZapine (ZYPREXA) 5 MG tablet Take 1 tablet (5 mg total) by mouth every evening. Take as directed on days 1-4 of your chemotherapy cycle. (Patient not taking: Reported on 7/7/2022) 30 tablet 5    ondansetron (ZOFRAN-ODT) 8 MG TbDL Take 1 tablet (8 mg total) by mouth every 8 (eight) hours as needed (nausea/vomiting). (Patient not taking: No sig reported) 60 tablet 5     No current facility-administered medications for this visit.     Facility-Administered Medications Ordered in Other Visits   Medication Dose Route Frequency Provider Last Rate Last Admin    lactated ringers infusion   Intravenous Continuous Torsten Hilton MD           Review of Systems   Constitutional: Positive for fatigue. Negative for activity change, appetite change, chills, diaphoresis, fever and unexpected weight change.   HENT: Positive for sore throat and trouble swallowing. Negative for nosebleeds.         Intermittent oral bleeding   Eyes: Negative for visual disturbance.   Respiratory: Negative for cough, chest tightness, shortness of breath and wheezing.    Cardiovascular: Negative for chest pain and leg swelling.   Gastrointestinal: Negative for abdominal distention, abdominal pain, blood in stool, constipation, diarrhea, nausea and vomiting.   Endocrine: Negative for cold intolerance and heat intolerance.   Genitourinary: Negative for difficulty urinating and dysuria.   Musculoskeletal: Negative for arthralgias and back pain.   Skin: Negative for color change.   Neurological: Positive for  "speech difficulty (s/p TL). Negative for dizziness, light-headedness, numbness and headaches.   Hematological: Negative for adenopathy. Does not bruise/bleed easily.   Psychiatric/Behavioral: Negative for confusion.       ECOG Performance Status:     ECOG SCORE    1 - Restricted in strenuous activity-ambulatory and able to carry out work of a light nature         Objective:      Vitals:   Vitals:    07/07/22 1501   BP: 102/63   BP Location: Right arm   Patient Position: Sitting   BP Method: Medium (Automatic)   Pulse: 92   Resp: 16   SpO2: 99%   Height: 5' 6" (1.676 m)     BMI: Body mass index is 22.26 kg/m².    Physical Exam  Constitutional:       General: He is not in acute distress.     Appearance: Normal appearance. He is not ill-appearing.   HENT:      Head: Normocephalic and atraumatic.      Nose: Nose normal.      Mouth/Throat:      Mouth: Mucous membranes are moist.   Eyes:      General: No scleral icterus.     Extraocular Movements: Extraocular movements intact.      Conjunctiva/sclera: Conjunctivae normal.      Pupils: Pupils are equal, round, and reactive to light.   Neck:      Comments: +trach  Cardiovascular:      Rate and Rhythm: Normal rate and regular rhythm.      Heart sounds: No murmur heard.    No friction rub. No gallop.      Comments: +chest wall port  Pulmonary:      Effort: Pulmonary effort is normal. No respiratory distress.      Breath sounds: No stridor. No wheezing, rhonchi or rales.   Abdominal:      General: Bowel sounds are normal. There is no distension.      Palpations: Abdomen is soft. There is no mass.      Tenderness: There is no abdominal tenderness. There is no guarding or rebound.      Comments: +peg tube   Musculoskeletal:         General: Normal range of motion.      Cervical back: Normal range of motion and neck supple.      Right lower leg: No edema.      Left lower leg: No edema.   Skin:     General: Skin is warm and dry.   Neurological:      Mental Status: He is alert.     "  Motor: No weakness.      Coordination: Coordination normal.   Psychiatric:         Mood and Affect: Mood normal.         Laboratory Data:   Lab Results   Component Value Date    WBC 5.73 06/09/2022    HGB 8.3 (L) 06/09/2022    HCT 27.1 (L) 06/09/2022    MCV 81 (L) 06/09/2022     06/09/2022       BMP  Lab Results   Component Value Date     (L) 06/10/2022    K 3.9 06/10/2022     06/10/2022    CO2 27 06/10/2022    BUN 18 06/10/2022    CREATININE 0.9 06/10/2022    CALCIUM 8.6 (L) 06/10/2022    ANIONGAP 6 (L) 06/10/2022    ESTGFRAFRICA >60.0 06/10/2022    EGFRNONAA >60.0 06/10/2022     Lab Results   Component Value Date    FERRITIN 8 (L) 06/09/2022         Imaging:     X-Ray Chest 1 View    Result Date: 6/9/2022  Reason: port placement FINDINGS: PA and lateral chest with comparison chest x-ray June 8, 2022 show normal cardiomediastinal silhouette. Left subclavian Port-A-Cath noted with tip in the anatomic region of the SVC. No pneumothorax. There is mild bilateral perihilar haziness. Pulmonary vasculature is normal. No acute osseous abnormality. IMPRESSION: 1.  Left subclavian Port-A-Cath tip in the anatomic region of the SVC. 2.  No pneumothorax. 3.  Mild bilateral perihilar haziness could reflect summation of densities, mild pulmonary edema or other infiltrate in the proper clinical settings. Electronically signed by:  J Carlos Frey DO  6/9/2022 2:00 PM CDT Workstation: [id number]    X-Ray Chest AP Portable    Result Date: 6/8/2022  HISTORY: Chest Pain  head and neck carcinoma. FINDINGS: Portable chest radiograph at 1843 hours compared to prior exams shows the cardiomediastinal silhouette and pulmonary vasculature are within normal limits. The lungs are normally expanded, with no consolidation, large pleural effusion, or evidence of pulmonary edema. No confluent infiltrates or pneumothorax. No acute fractures or destructive osseous lesions. Surgical clips overlie the neck and supraclavicular soft  tissues. IMPRESSION: No evidence of acute cardiopulmonary disease. Electronically signed by:  Valente Parra MD  6/8/2022 7:35 PM CDT Workstation: [id number]    FL Fluoro For Venous Access    Result Date: 6/9/2022  See Notes This procedure was auto-finalized.    5/20/22 CTA neck  COMPARISON:  PET/CT from May 13, 2022. Previous CT scan of the neck from April 24, 2022     FINDINGS:  LUNG apices: Pleural parenchymal airspace disease is present superiorly and anteriorly within the upper lobes bilaterally.  This is similar to the previous study.  NECK soft tissues: Timing of the contrast bolus was performed during the arterial phase as part of angiography.  The patient is had surgical resection of much of the soft tissues within the anterior neck with a open defect in the lower chest above the manubrium to the trachea. No tracheostomy tube.  Some soft tissue within the trachea may be mucus. Surgical clips are present within the operative bed.  The airway above the open anterior neck defect has been resected, or filled in with tumor  Within the hypopharynx within the region of the epiglottis there is a rim-enhancing mass with some peripheral calcification.  This is the same area where the lesion was present on the recent PET/CT.  This is the same area with there is a mass on the CT scan of April 24, 2022.  Due to the timing of the contrast bolus the mass was better demonstrated on the previous study.  The calcification associated with the mass remains.  There is some cavitation centrally within the mass.  Due to the decreased enhancement due to the early arterial phase imaging measuring the margins of the mass on today's study is more difficult.  I measure it at 3.3 x 2.8 cm. My attempts to measure the mass at the same level with cursor placement in similar positions, I measure it on the April 24, 2022 study at 2.7 x 2.5 cm, suggesting that it is increased in size.  Indistinctness extending into the lateral neck and the  regions deep to the sternocleidomastoid muscle.  Additional scarring.  Submandibular glands remain.  The parotid glands remain but are atrophic.     BONES: Multilevel degenerative disc disease.  Facet arthropathy.  No osseous metastasis perceived.  Poor dentition.     AORTA: Elongation of the thoracic aorta without aneurysm or dissection.  Flow remains within the brachiocephalic artery and bilateral subclavian arteries.  There is some soft and calcific plaque circumferentially around the origin of the left subclavian artery possibly within the radiation field  RIGHT CCA: Calcific plaque is present throughout the right CCA without significant stenosis  RIGHT ICA: The carotid bulb is widely patent without significant disease.  The right ICA remains patent into the skull     LEFT CCA: Calcific plaque throughout the length of the left CCA.  LEFT ICA: The left carotid bulb is widely patent.  Left ICA is patent into the skull     RIGHT vertebral: The right vertebral artery is small, within normal variability..  After the branch of the right posterior inferior cerebellar artery there is stenosis to the right intracranial portions of the vertebral artery possibly with a short segment occlusion.  LEFT vertebral: The left vertebral artery is dominant.  Remains patent into the skull to form the preponderance of flow to the basilar artery     IMPRESSION:  Persistent mass within the hypopharynx consistent with malignancy.  By my measurements it is larger than on April 24, 2022 with central necrosis.  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 This is similar to the previous April 2022 study.  No evidence of arterial compromise related to malignancy.    Assessment:       1. Primary cancer of base of tongue    2. Larynx cancer    3. Iron deficiency anemia due to chronic blood loss    4. Postoperative hypothyroidism    5. S/P laryngectomy    6. Oral bleeding            Plan:       Problem List Items Addressed This Visit        ENT    S/P laryngectomy    Oral bleeding       Oncology    Larynx cancer    Overview     9/16/20-10/30/20 radiation to larynx and bilateral necks  1/6/22 TL with bilateral neck dissection and total thyroidectomy           Primary cancer of base of tongue - Primary    Current Assessment & Plan     At least stage II p16 negative sq.c.c of base of tongue vs recurrent/metastatic sq.c.c. from larynx.  Clinically T2N0 and still operable but patient has opted for palliative systemic therapy.  -discussed with him that chemo response rates for chemo naiive patients are quite high so I expect his dysphagia to improve after 2-3 cycles.    -reviewed expected side effects including mucositis from anticipated neutropenia  -counseled patient on importance of hydration and maintaining caloric intake.  Emphasized importance of trying to maintain weight and activity levels  -reviewed prognosis; as long as surgery is still an option he would consider in the future, then it is not unrealistic to survive beyond 2 years.  -consider INTERLINK trial at next progression if he still wants to continue with palliative systemic therapy           Iron deficiency anemia due to chronic blood loss    Current Assessment & Plan     Lab Results   Component Value Date    FERRITIN 8 (L) 06/09/2022     Microcytic anemia with unequivocal evidence of iron deficiency.  Suspected source is chronic blood loss (oral).  -submit plan for IV injectafer that needs expedited auth and for him to start ASAP, especially with the chemotherapy he's about to receive.              Endocrine    Postoperative hypothyroidism    Current Assessment & Plan     Currently on levothyroxine, last TSH/fT4 5/2022  -monitor tsh while on immunotherapy  -continue levothyroxine                 No orders of the defined types were placed in this encounter.          Saúl Kessler MD  Hematology Oncology    I, Dr. Kessler, personally spent  81 minutes during this encounter.  Time includes face-to-face time and direct counseling and/or coordination of care, and non-face-to-face time including review of results including labs and imaging, documentation, orders, and scheduling.      Route Chart for Scheduling    Med Onc Chart Routing  Urgent    Follow up with physician No follow up needed.   Follow up with RICHARD    Infusion scheduling note support plan IV iron needs expedited auth and scheduling at [place] when approved   Injection scheduling note    Labs None   Lab interval:     Imaging None      Pharmacy appointment No pharmacy appointment needed      Other referrals No additional referrals needed

## 2022-07-07 NOTE — ASSESSMENT & PLAN NOTE
At least stage II p16 negative sq.c.c of base of tongue vs recurrent/metastatic sq.c.c. from larynx.  Clinically T2N0 and still operable but patient has opted for palliative systemic therapy.  -discussed with him that chemo response rates for chemo naiive patients are quite high so I expect his dysphagia to improve after 2-3 cycles.    -reviewed expected side effects including mucositis from anticipated neutropenia  -counseled patient on importance of hydration and maintaining caloric intake.  Emphasized importance of trying to maintain weight and activity levels  -reviewed prognosis; as long as surgery is still an option he would consider in the future, then it is not unrealistic to survive beyond 2 years.  -consider INTERLINK trial at next progression if he still wants to continue with palliative systemic therapy

## 2022-07-08 ENCOUNTER — LAB VISIT (OUTPATIENT)
Dept: LAB | Facility: HOSPITAL | Age: 72
End: 2022-07-08
Attending: INTERNAL MEDICINE
Payer: MEDICARE

## 2022-07-08 ENCOUNTER — TELEPHONE (OUTPATIENT)
Dept: HEMATOLOGY/ONCOLOGY | Facility: CLINIC | Age: 72
End: 2022-07-08
Payer: MEDICARE

## 2022-07-08 DIAGNOSIS — E03.9 HYPOTHYROIDISM, UNSPECIFIED TYPE: ICD-10-CM

## 2022-07-08 DIAGNOSIS — C32.0 MALIGNANT NEOPLASM OF TRUE VOCAL CORD: ICD-10-CM

## 2022-07-08 LAB
ALBUMIN SERPL BCP-MCNC: 4.3 G/DL (ref 3.5–5.2)
ALP SERPL-CCNC: 100 U/L (ref 55–135)
ALT SERPL W/O P-5'-P-CCNC: 17 U/L (ref 10–44)
ANION GAP SERPL CALC-SCNC: 7 MMOL/L (ref 8–16)
AST SERPL-CCNC: 17 U/L (ref 10–40)
BASOPHILS # BLD AUTO: 0.02 K/UL (ref 0–0.2)
BASOPHILS NFR BLD: 0.3 % (ref 0–1.9)
BILIRUB SERPL-MCNC: 1 MG/DL (ref 0.1–1)
BUN SERPL-MCNC: 31 MG/DL (ref 8–23)
CALCIUM SERPL-MCNC: 9.5 MG/DL (ref 8.7–10.5)
CHLORIDE SERPL-SCNC: 102 MMOL/L (ref 95–110)
CO2 SERPL-SCNC: 27 MMOL/L (ref 23–29)
CREAT SERPL-MCNC: 1 MG/DL (ref 0.5–1.4)
DIFFERENTIAL METHOD: ABNORMAL
EOSINOPHIL # BLD AUTO: 0.2 K/UL (ref 0–0.5)
EOSINOPHIL NFR BLD: 3.2 % (ref 0–8)
ERYTHROCYTE [DISTWIDTH] IN BLOOD BY AUTOMATED COUNT: 16.1 % (ref 11.5–14.5)
EST. GFR  (AFRICAN AMERICAN): >60 ML/MIN/1.73 M^2
EST. GFR  (NON AFRICAN AMERICAN): >60 ML/MIN/1.73 M^2
GLUCOSE SERPL-MCNC: 171 MG/DL (ref 70–110)
HCT VFR BLD AUTO: 32.8 % (ref 40–54)
HGB BLD-MCNC: 9.7 G/DL (ref 14–18)
IMM GRANULOCYTES # BLD AUTO: 0.05 K/UL (ref 0–0.04)
IMM GRANULOCYTES NFR BLD AUTO: 0.7 % (ref 0–0.5)
LYMPHOCYTES # BLD AUTO: 0.9 K/UL (ref 1–4.8)
LYMPHOCYTES NFR BLD: 12.3 % (ref 18–48)
MCH RBC QN AUTO: 23.9 PG (ref 27–31)
MCHC RBC AUTO-ENTMCNC: 29.6 G/DL (ref 32–36)
MCV RBC AUTO: 81 FL (ref 82–98)
MONOCYTES # BLD AUTO: 0.4 K/UL (ref 0.3–1)
MONOCYTES NFR BLD: 5.9 % (ref 4–15)
NEUTROPHILS # BLD AUTO: 5.7 K/UL (ref 1.8–7.7)
NEUTROPHILS NFR BLD: 77.6 % (ref 38–73)
NRBC BLD-RTO: 0 /100 WBC
PLATELET # BLD AUTO: 291 K/UL (ref 150–450)
PMV BLD AUTO: 12 FL (ref 9.2–12.9)
POTASSIUM SERPL-SCNC: 4.2 MMOL/L (ref 3.5–5.1)
PROT SERPL-MCNC: 7.8 G/DL (ref 6–8.4)
RBC # BLD AUTO: 4.06 M/UL (ref 4.6–6.2)
SODIUM SERPL-SCNC: 136 MMOL/L (ref 136–145)
T4 FREE SERPL-MCNC: 1.36 NG/DL (ref 0.71–1.51)
TSH SERPL DL<=0.005 MIU/L-ACNC: 0.07 UIU/ML (ref 0.34–5.6)
WBC # BLD AUTO: 7.29 K/UL (ref 3.9–12.7)

## 2022-07-08 PROCEDURE — 84439 ASSAY OF FREE THYROXINE: CPT | Performed by: NURSE PRACTITIONER

## 2022-07-08 PROCEDURE — 36415 COLL VENOUS BLD VENIPUNCTURE: CPT | Performed by: NURSE PRACTITIONER

## 2022-07-08 PROCEDURE — 36415 COLL VENOUS BLD VENIPUNCTURE: CPT | Performed by: INTERNAL MEDICINE

## 2022-07-08 PROCEDURE — 85025 COMPLETE CBC W/AUTO DIFF WBC: CPT | Performed by: INTERNAL MEDICINE

## 2022-07-08 PROCEDURE — 84443 ASSAY THYROID STIM HORMONE: CPT | Performed by: NURSE PRACTITIONER

## 2022-07-08 PROCEDURE — 80053 COMPREHEN METABOLIC PANEL: CPT | Performed by: INTERNAL MEDICINE

## 2022-07-08 RX ORDER — SODIUM CHLORIDE 0.9 % (FLUSH) 0.9 %
10 SYRINGE (ML) INJECTION
Status: CANCELLED | OUTPATIENT
Start: 2022-07-18

## 2022-07-08 RX ORDER — SODIUM CHLORIDE 0.9 % (FLUSH) 0.9 %
10 SYRINGE (ML) INJECTION
Status: CANCELLED | OUTPATIENT
Start: 2022-07-14

## 2022-07-08 RX ORDER — HEPARIN 100 UNIT/ML
500 SYRINGE INTRAVENOUS
Status: CANCELLED | OUTPATIENT
Start: 2022-07-18

## 2022-07-08 RX ORDER — DIPHENHYDRAMINE HYDROCHLORIDE 50 MG/ML
50 INJECTION INTRAMUSCULAR; INTRAVENOUS ONCE AS NEEDED
Status: CANCELLED | OUTPATIENT
Start: 2022-07-14

## 2022-07-08 RX ORDER — EPINEPHRINE 0.3 MG/.3ML
0.3 INJECTION SUBCUTANEOUS ONCE AS NEEDED
Status: CANCELLED | OUTPATIENT
Start: 2022-07-14

## 2022-07-08 RX ORDER — HEPARIN 100 UNIT/ML
500 SYRINGE INTRAVENOUS
Status: CANCELLED | OUTPATIENT
Start: 2022-07-14

## 2022-07-10 ENCOUNTER — PATIENT MESSAGE (OUTPATIENT)
Dept: HEMATOLOGY/ONCOLOGY | Facility: CLINIC | Age: 72
End: 2022-07-10
Payer: MEDICARE

## 2022-07-11 ENCOUNTER — DOCUMENTATION ONLY (OUTPATIENT)
Dept: HEMATOLOGY/ONCOLOGY | Facility: CLINIC | Age: 72
End: 2022-07-11

## 2022-07-11 ENCOUNTER — INFUSION (OUTPATIENT)
Dept: INFUSION THERAPY | Facility: HOSPITAL | Age: 72
End: 2022-07-11
Attending: INTERNAL MEDICINE
Payer: MEDICARE

## 2022-07-11 VITALS
HEART RATE: 90 BPM | WEIGHT: 142.19 LBS | RESPIRATION RATE: 18 BRPM | SYSTOLIC BLOOD PRESSURE: 107 MMHG | TEMPERATURE: 98 F | OXYGEN SATURATION: 100 % | BODY MASS INDEX: 22.85 KG/M2 | DIASTOLIC BLOOD PRESSURE: 62 MMHG | HEIGHT: 66 IN

## 2022-07-11 DIAGNOSIS — Z79.899 HIGH RISK MEDICATIONS (NOT ANTICOAGULANTS) LONG-TERM USE: ICD-10-CM

## 2022-07-11 DIAGNOSIS — C01 PRIMARY CANCER OF BASE OF TONGUE: Primary | ICD-10-CM

## 2022-07-11 LAB
T4 FREE SERPL-MCNC: 1.37 NG/DL (ref 0.71–1.51)
TSH SERPL DL<=0.005 MIU/L-ACNC: 0.08 UIU/ML (ref 0.34–5.6)

## 2022-07-11 PROCEDURE — 84443 ASSAY THYROID STIM HORMONE: CPT | Performed by: INTERNAL MEDICINE

## 2022-07-11 PROCEDURE — 36415 COLL VENOUS BLD VENIPUNCTURE: CPT | Performed by: INTERNAL MEDICINE

## 2022-07-11 PROCEDURE — 96417 CHEMO IV INFUS EACH ADDL SEQ: CPT

## 2022-07-11 PROCEDURE — 96416 CHEMO PROLONG INFUSE W/PUMP: CPT

## 2022-07-11 PROCEDURE — 96413 CHEMO IV INFUSION 1 HR: CPT

## 2022-07-11 PROCEDURE — 63600175 PHARM REV CODE 636 W HCPCS: Mod: JG | Performed by: INTERNAL MEDICINE

## 2022-07-11 PROCEDURE — 84439 ASSAY OF FREE THYROXINE: CPT | Performed by: INTERNAL MEDICINE

## 2022-07-11 PROCEDURE — 96375 TX/PRO/DX INJ NEW DRUG ADDON: CPT

## 2022-07-11 PROCEDURE — 25000003 PHARM REV CODE 250: Performed by: INTERNAL MEDICINE

## 2022-07-11 PROCEDURE — 96367 TX/PROPH/DG ADDL SEQ IV INF: CPT

## 2022-07-11 RX ORDER — EPINEPHRINE 0.3 MG/.3ML
0.3 INJECTION SUBCUTANEOUS ONCE AS NEEDED
Status: DISCONTINUED | OUTPATIENT
Start: 2022-07-11 | End: 2022-07-11 | Stop reason: HOSPADM

## 2022-07-11 RX ORDER — SODIUM CHLORIDE 0.9 % (FLUSH) 0.9 %
10 SYRINGE (ML) INJECTION
Status: DISCONTINUED | OUTPATIENT
Start: 2022-07-11 | End: 2022-07-11 | Stop reason: HOSPADM

## 2022-07-11 RX ADMIN — SODIUM CHLORIDE: 0.9 INJECTION, SOLUTION INTRAVENOUS at 12:07

## 2022-07-11 RX ADMIN — CARBOPLATIN 440 MG: 600 INJECTION, SOLUTION INTRAVENOUS at 12:07

## 2022-07-11 RX ADMIN — APREPITANT 130 MG: 130 INJECTION, EMULSION INTRAVENOUS at 12:07

## 2022-07-11 RX ADMIN — DEXAMETHASONE SODIUM PHOSPHATE 0.25 MG: 4 INJECTION, SOLUTION INTRA-ARTICULAR; INTRALESIONAL; INTRAMUSCULAR; INTRAVENOUS; SOFT TISSUE at 12:07

## 2022-07-11 RX ADMIN — FLUOROURACIL 6920 MG: 50 INJECTION, SOLUTION INTRAVENOUS at 02:07

## 2022-07-11 RX ADMIN — SODIUM CHLORIDE 200 MG: 9 INJECTION, SOLUTION INTRAVENOUS at 11:07

## 2022-07-11 NOTE — PLAN OF CARE
Problem: Fall Injury Risk  Goal: Absence of Fall and Fall-Related Injury  Outcome: Ongoing, Progressing  Intervention: Identify and Manage Contributors  Flowsheets (Taken 7/11/2022 1109)  Self-Care Promotion: independence encouraged  Medication Review/Management: medications reviewed  Intervention: Promote Injury-Free Environment  Flowsheets (Taken 7/11/2022 1109)  Safety Promotion/Fall Prevention: medications reviewed

## 2022-07-11 NOTE — PROGRESS NOTES
CHEMO SCHOOL- NUTRITION    Cyrus Batres Jr. is a 71 y.o. male with recurrent laryngeal cancer at the Base of Tongue s/p laryngectomy. Pt will be receiving carboplatin and adrucil. I met with Mr Medardo and his wife for chemo school today. Pt is aphonic and fully peg tube dependent to meet nutritional needs. Pt denies having any N/V/D, bloating or excessive gas with TF. He does have some occasional po intake of thin liquids and soft solids such as cream of wheat. He does have some bleeding from his tumor which effects po intake. He denies having any nutrition related questions or concerns.     CW: 142#.     Plan:     1. Discussed importance of maintaining wt & staying hydrated  2. Continue TF at goal volume. Contact RD for any s/s of intolerance during treatment course  3. Recommended baking soda, salt mouthwash at least TID to help maintain oral health  4. Provided RD contact info & encouraged pt to call with any questions/concerns.   5. Will f/u as needed.       Electronically signed by: Barbara Zayas MBA, RDN, LDN

## 2022-07-12 ENCOUNTER — PATIENT MESSAGE (OUTPATIENT)
Dept: HEMATOLOGY/ONCOLOGY | Facility: CLINIC | Age: 72
End: 2022-07-12
Payer: MEDICARE

## 2022-07-13 ENCOUNTER — OFFICE VISIT (OUTPATIENT)
Dept: FAMILY MEDICINE | Facility: CLINIC | Age: 72
End: 2022-07-13
Payer: MEDICARE

## 2022-07-13 ENCOUNTER — PATIENT MESSAGE (OUTPATIENT)
Dept: FAMILY MEDICINE | Facility: CLINIC | Age: 72
End: 2022-07-13

## 2022-07-13 VITALS
BODY MASS INDEX: 23.7 KG/M2 | HEART RATE: 79 BPM | WEIGHT: 147.5 LBS | SYSTOLIC BLOOD PRESSURE: 111 MMHG | RESPIRATION RATE: 20 BRPM | OXYGEN SATURATION: 100 % | DIASTOLIC BLOOD PRESSURE: 64 MMHG | HEIGHT: 66 IN

## 2022-07-13 DIAGNOSIS — Z97.8 USES FEEDING TUBE: ICD-10-CM

## 2022-07-13 DIAGNOSIS — Z93.0 TRACHEOSTOMY TUBE PRESENT: ICD-10-CM

## 2022-07-13 DIAGNOSIS — C80.1 ANEMIA ASSOCIATED WITH MALIGNANT NEOPLASTIC DISEASE: ICD-10-CM

## 2022-07-13 DIAGNOSIS — D63.0 ANEMIA ASSOCIATED WITH MALIGNANT NEOPLASTIC DISEASE: ICD-10-CM

## 2022-07-13 DIAGNOSIS — I10 ESSENTIAL HYPERTENSION: Primary | Chronic | ICD-10-CM

## 2022-07-13 DIAGNOSIS — R73.9 ELEVATED BLOOD SUGAR: ICD-10-CM

## 2022-07-13 DIAGNOSIS — E89.0 HISTORY OF TOTAL THYROIDECTOMY: Chronic | ICD-10-CM

## 2022-07-13 DIAGNOSIS — R49.1 APHONIA: ICD-10-CM

## 2022-07-13 PROCEDURE — 3066F PR DOCUMENTATION OF TREATMENT FOR NEPHROPATHY: ICD-10-PCS | Mod: CPTII,S$GLB,, | Performed by: INTERNAL MEDICINE

## 2022-07-13 PROCEDURE — 1160F RVW MEDS BY RX/DR IN RCRD: CPT | Mod: CPTII,S$GLB,, | Performed by: INTERNAL MEDICINE

## 2022-07-13 PROCEDURE — 3074F PR MOST RECENT SYSTOLIC BLOOD PRESSURE < 130 MM HG: ICD-10-PCS | Mod: CPTII,S$GLB,, | Performed by: INTERNAL MEDICINE

## 2022-07-13 PROCEDURE — 3074F SYST BP LT 130 MM HG: CPT | Mod: CPTII,S$GLB,, | Performed by: INTERNAL MEDICINE

## 2022-07-13 PROCEDURE — 3008F BODY MASS INDEX DOCD: CPT | Mod: CPTII,S$GLB,, | Performed by: INTERNAL MEDICINE

## 2022-07-13 PROCEDURE — 1159F PR MEDICATION LIST DOCUMENTED IN MEDICAL RECORD: ICD-10-PCS | Mod: CPTII,S$GLB,, | Performed by: INTERNAL MEDICINE

## 2022-07-13 PROCEDURE — 3078F PR MOST RECENT DIASTOLIC BLOOD PRESSURE < 80 MM HG: ICD-10-PCS | Mod: CPTII,S$GLB,, | Performed by: INTERNAL MEDICINE

## 2022-07-13 PROCEDURE — 99214 PR OFFICE/OUTPT VISIT, EST, LEVL IV, 30-39 MIN: ICD-10-PCS | Mod: S$GLB,,, | Performed by: INTERNAL MEDICINE

## 2022-07-13 PROCEDURE — 1126F AMNT PAIN NOTED NONE PRSNT: CPT | Mod: CPTII,S$GLB,, | Performed by: INTERNAL MEDICINE

## 2022-07-13 PROCEDURE — 3044F HG A1C LEVEL LT 7.0%: CPT | Mod: CPTII,S$GLB,, | Performed by: INTERNAL MEDICINE

## 2022-07-13 PROCEDURE — 4010F PR ACE/ARB THEARPY RXD/TAKEN: ICD-10-PCS | Mod: CPTII,S$GLB,, | Performed by: INTERNAL MEDICINE

## 2022-07-13 PROCEDURE — 3288F FALL RISK ASSESSMENT DOCD: CPT | Mod: CPTII,S$GLB,, | Performed by: INTERNAL MEDICINE

## 2022-07-13 PROCEDURE — 99214 OFFICE O/P EST MOD 30 MIN: CPT | Mod: S$GLB,,, | Performed by: INTERNAL MEDICINE

## 2022-07-13 PROCEDURE — 3008F PR BODY MASS INDEX (BMI) DOCUMENTED: ICD-10-PCS | Mod: CPTII,S$GLB,, | Performed by: INTERNAL MEDICINE

## 2022-07-13 PROCEDURE — 1101F PT FALLS ASSESS-DOCD LE1/YR: CPT | Mod: CPTII,S$GLB,, | Performed by: INTERNAL MEDICINE

## 2022-07-13 PROCEDURE — 3044F PR MOST RECENT HEMOGLOBIN A1C LEVEL <7.0%: ICD-10-PCS | Mod: CPTII,S$GLB,, | Performed by: INTERNAL MEDICINE

## 2022-07-13 PROCEDURE — 3060F POS MICROALBUMINURIA REV: CPT | Mod: CPTII,S$GLB,, | Performed by: INTERNAL MEDICINE

## 2022-07-13 PROCEDURE — 3060F PR POS MICROALBUMINURIA RESULT DOCUMENTED/REVIEW: ICD-10-PCS | Mod: CPTII,S$GLB,, | Performed by: INTERNAL MEDICINE

## 2022-07-13 PROCEDURE — 3288F PR FALLS RISK ASSESSMENT DOCUMENTED: ICD-10-PCS | Mod: CPTII,S$GLB,, | Performed by: INTERNAL MEDICINE

## 2022-07-13 PROCEDURE — 3078F DIAST BP <80 MM HG: CPT | Mod: CPTII,S$GLB,, | Performed by: INTERNAL MEDICINE

## 2022-07-13 PROCEDURE — 4010F ACE/ARB THERAPY RXD/TAKEN: CPT | Mod: CPTII,S$GLB,, | Performed by: INTERNAL MEDICINE

## 2022-07-13 PROCEDURE — 3066F NEPHROPATHY DOC TX: CPT | Mod: CPTII,S$GLB,, | Performed by: INTERNAL MEDICINE

## 2022-07-13 PROCEDURE — 1101F PR PT FALLS ASSESS DOC 0-1 FALLS W/OUT INJ PAST YR: ICD-10-PCS | Mod: CPTII,S$GLB,, | Performed by: INTERNAL MEDICINE

## 2022-07-13 PROCEDURE — 1159F MED LIST DOCD IN RCRD: CPT | Mod: CPTII,S$GLB,, | Performed by: INTERNAL MEDICINE

## 2022-07-13 PROCEDURE — 1160F PR REVIEW ALL MEDS BY PRESCRIBER/CLIN PHARMACIST DOCUMENTED: ICD-10-PCS | Mod: CPTII,S$GLB,, | Performed by: INTERNAL MEDICINE

## 2022-07-13 PROCEDURE — 1126F PR PAIN SEVERITY QUANTIFIED, NO PAIN PRESENT: ICD-10-PCS | Mod: CPTII,S$GLB,, | Performed by: INTERNAL MEDICINE

## 2022-07-13 RX ORDER — SODIUM CHLORIDE 0.9 % (FLUSH) 0.9 %
10 SYRINGE (ML) INJECTION
Status: CANCELLED | OUTPATIENT
Start: 2022-07-15

## 2022-07-13 RX ORDER — SODIUM CHLORIDE 0.9 % (FLUSH) 0.9 %
10 SYRINGE (ML) INJECTION
Status: CANCELLED | OUTPATIENT
Start: 2022-08-05

## 2022-07-13 RX ORDER — HEPARIN 100 UNIT/ML
500 SYRINGE INTRAVENOUS
Status: CANCELLED | OUTPATIENT
Start: 2022-08-05

## 2022-07-13 RX ORDER — SODIUM CHLORIDE 0.9 % (FLUSH) 0.9 %
10 SYRINGE (ML) INJECTION
Status: CANCELLED | OUTPATIENT
Start: 2022-08-12

## 2022-07-13 RX ORDER — SODIUM CHLORIDE 0.9 % (FLUSH) 0.9 %
10 SYRINGE (ML) INJECTION
Status: CANCELLED | OUTPATIENT
Start: 2022-09-02

## 2022-07-13 RX ORDER — HEPARIN 100 UNIT/ML
500 SYRINGE INTRAVENOUS
Status: CANCELLED | OUTPATIENT
Start: 2022-08-12

## 2022-07-13 RX ORDER — HEPARIN 100 UNIT/ML
500 SYRINGE INTRAVENOUS
Status: CANCELLED | OUTPATIENT
Start: 2022-08-26

## 2022-07-13 RX ORDER — SODIUM CHLORIDE 0.9 % (FLUSH) 0.9 %
10 SYRINGE (ML) INJECTION
Status: CANCELLED | OUTPATIENT
Start: 2022-08-19

## 2022-07-13 RX ORDER — HEPARIN 100 UNIT/ML
500 SYRINGE INTRAVENOUS
Status: CANCELLED | OUTPATIENT
Start: 2022-07-22

## 2022-07-13 RX ORDER — SODIUM CHLORIDE 0.9 % (FLUSH) 0.9 %
10 SYRINGE (ML) INJECTION
Status: CANCELLED | OUTPATIENT
Start: 2022-07-22

## 2022-07-13 RX ORDER — HEPARIN 100 UNIT/ML
500 SYRINGE INTRAVENOUS
Status: CANCELLED | OUTPATIENT
Start: 2022-09-10

## 2022-07-13 RX ORDER — HEPARIN 100 UNIT/ML
500 SYRINGE INTRAVENOUS
Status: CANCELLED | OUTPATIENT
Start: 2022-07-15

## 2022-07-13 RX ORDER — SODIUM CHLORIDE 0.9 % (FLUSH) 0.9 %
10 SYRINGE (ML) INJECTION
Status: CANCELLED | OUTPATIENT
Start: 2022-09-10

## 2022-07-13 RX ORDER — SODIUM CHLORIDE 0.9 % (FLUSH) 0.9 %
10 SYRINGE (ML) INJECTION
Status: CANCELLED | OUTPATIENT
Start: 2022-08-26

## 2022-07-13 RX ORDER — METFORMIN HYDROCHLORIDE 500 MG/1
500 TABLET ORAL 2 TIMES DAILY WITH MEALS
Qty: 60 TABLET | Refills: 3
Start: 2022-07-13 | End: 2023-08-01

## 2022-07-13 RX ORDER — HEPARIN 100 UNIT/ML
500 SYRINGE INTRAVENOUS
Status: CANCELLED | OUTPATIENT
Start: 2022-08-19

## 2022-07-13 RX ORDER — HEPARIN 100 UNIT/ML
500 SYRINGE INTRAVENOUS
Status: CANCELLED | OUTPATIENT
Start: 2022-09-02

## 2022-07-13 NOTE — Clinical Note
Anabel Ms Fernandes - I saw a prescription for losartan given by you.  Do you know what his thyroid problem is and reason for losartan besides blood pressure.

## 2022-07-13 NOTE — Clinical Note
Patient came to follow-up with me as a PCP.  His TSH is sub therapeutic.  Is that the goal or there is any thyroid issue which is being treated.?

## 2022-07-13 NOTE — PROGRESS NOTES
Subjective:       Patient ID: Cyrus Batres Jr. is a 71 y.o. male.    Chief Complaint: Thyroid Problem, Hypertension, History of head and neck cancer, Hyperglycemia (History of diabetes.), PEG tube feeding, and Anemia secondary to cancer    Pt is a 71-year-old male who comes follow-up.  He was seen recently 3 months back.  Lot of interventions have happened in the interim and it has been difficult for me to keep pace with his day to day  changing Medical, clinical and social interventions.    He communicates by writing on a tablet.  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  Got radiation treatment and he has maxed out on it.  No metastasis.  Now to be considered for  palliative chemotherapy.    Blood pressures seem to be doing okay.  He is on 100 mg of losartan.  We are on the lower side.    He is on levothyroxine.    Discharge Diagnosis:  From hospital.  1. Larynx neoplasm malignant   2. Hemoptysis   3. Type 2 diabetes mellitus with hyperglycemia, without long-term current use of insulin   4. On enteral nutrition   5. Hyperlipidemia, unspecified hyperlipidemia type   6. Paroxysmal atrial fibrillation   7. Adverse effect of adrenal cortical steroids, sequela   8. 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  I will treat him with a combination of chemo  plus immunotherapy with carboplatin, 5 FU, and Keytruda.  I will set him up with chemotherapy school.  I have obtained consents.  He already has a port placed.  Hopefully can start treatment ASAP.  Explained to him that all treatment will be palliative in nature patient cannot be cured he understood and wants to move forward.    Thyroid Problem  Presents for follow-up visit. Symptoms include weight loss. Patient reports no constipation, diarrhea or palpitations. The symptoms have been stable.   Hypertension  This is a chronic problem. The current episode started more than 1 year ago. The problem has been gradually improving since onset. The problem is controlled. Pertinent negatives include no chest pain or palpitations. Risk factors for coronary artery disease include sedentary lifestyle and male gender. Past treatments include angiotensin blockers. The current treatment provides significant improvement. Compliance problems include psychosocial issues.  Identifiable causes of hypertension include a thyroid problem.       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    Postoperative hypothyroidism 7/7/2022     Social History     Socioeconomic History    Marital status:      Spouse name: Janel Batres    Number of children: 2   Occupational History    Occupation: [employer]     Employer: [employer]   Tobacco Use    Smoking status: Never Smoker    Smokeless tobacco: Never Used   Substance and Sexual Activity    Alcohol use: Not Currently     Comment: occasional    Drug use: No    Sexual activity: Yes     Partners: Female   Social History Narrative    2 children from his 1st wife     Past Surgical History:   Procedure Laterality Date    DIRECT LARYNGOBRONCHOSCOPY N/A 12/27/2021    Procedure: LARYNGOSCOPY, DIRECT, WITH BRONCHOSCOPY;   Surgeon: Flex Espinosa MD;  [facility];  Service: ENT;  Laterality: N/A;    DISSECTION OF NECK Bilateral 1/6/2022    Procedure: DISSECTION, NECK;  Surgeon: Jesse James MD;  [facility];  Service: ENT;  Laterality: Bilateral;    FLAP PROCEDURE Right 1/6/2022    Procedure: CREATION, FREE FLAP;  Surgeon: Alise Hart MD;  [facility];  Service: ENT;  Laterality: Right;  Ischemic start 1351  Ischemic stop 1502    INSERTION OF TUNNELED CENTRAL VENOUS CATHETER (CVC) WITH SUBCUTANEOUS PORT N/A 6/9/2022    Procedure: [id number];  Surgeon: Jesus Viera MD;  Location[facility];  Service: General;  Laterality: N/A;    LARYNGECTOMY N/A 1/6/2022    Procedure: LARYNGECTOMY;  Surgeon: Jesse James MD;  [facility];  Service: ENT;  Laterality: N/A;    LARYNGOSCOPY N/A 8/4/2020    Procedure: Suspension microlaryngoscopy with biopsy, possible KTP laser treatment/excision;  Surgeon: Stew Noel MD;  [facility];  Service: ENT;  Laterality: N/A;  Microscope, telescopes, tower, microinstruments, KTP laser, rep conf# [id number] IC 7/28.    LARYNGOSCOPY N/A 3/16/2021    Procedure: Suspension microlaryngoscopy with excision of lesion, possible CO2 laser;  Surgeon: Stew Noel MD;  [facility];  Service: ENT;  Laterality: N/A;  Microscope, telescopes, tower, microinstruments, CO2 laser, rep conf# [id number] IC 3/4.    LARYNGOSCOPY N/A 4/1/2021    Procedure: Suspension microlaryngoscopy with KTP laser excision of lesion;  Surgeon: Stew Noel MD;  [facility];  Service: ENT;  Laterality: N/A;  Microscope, telescopes, tower, microinstruments, 70 degree scope, vocal fold , KTP laser, rep conf# [id number] BC    LARYNGOSCOPY N/A 12/9/2021    Procedure: Suspension microlaryngoscopy with biopsy;  Surgeon: Stew Noel MD;  Location: [facility] OR [address] [place] [address];  Service: ENT;   Laterality: N/A;  Microscope, telescopes, tower, microinstruments    LARYNGOSCOPY N/A 1/6/2022    Procedure: LARYNGOSCOPY;  Surgeon: Jesse James MD;  [facility];  Service: ENT;  Laterality: N/A;    LARYNGOSCOPY N/A 4/27/2022    Procedure: LARYNGOSCOPY WITH BIOPSY;  Surgeon: Jesse James MD;  [facility];  Service: ENT;  Laterality: N/A;    MICROLARYNGOSCOPY N/A 3/17/2020    Procedure: MICROLARYNGOSCOPY;  Surgeon: Jung Xiao MD;  [facility];  Service: ENT;  Laterality: N/A;  Laser Microlaryngoscopy  NEED TO SCHEDULE LASER from [employer] [id number]    REIMPLANTATION OF PARATHYROID TISSUE N/A 1/6/2022    Procedure: REIMPLANTATION, PARATHYROID TISSUE;  Surgeon: Jesse James MD;  [facility];  Service: ENT;  Laterality: N/A;    THYROIDECTOMY  1/6/2022    Procedure: THYROIDECTOMY;  Surgeon: Jesse James MD;  [facility];  Service: ENT;;    TRACHEOSTOMY N/A 12/27/2021    Procedure: CREATION, TRACHEOSTOMY;  Surgeon: Flex Espinosa MD;  [facility];  Service: ENT;  Laterality: N/A;     Family History   Problem Relation Age of Onset    Abnormal EKG Mother     Diabetes Father     Heart disease Father     Hypertension Father        Review of Systems   Constitutional: Positive for weight loss. Negative for activity change, fever and unexpected weight change (Gained 10 lb of weight from 137 lb.).   HENT: Positive for trouble swallowing. Negative for hearing loss.         Laryngeal cancer status post surgery.  Currently has a tracheotomy.   Eyes: Negative for discharge and visual disturbance.   Respiratory: Negative for chest tightness and wheezing.    Cardiovascular: Negative for chest pain and palpitations.   Gastrointestinal: Negative for abdominal distention, blood in stool, constipation, diarrhea and vomiting.        PEG tube feeding.   Endocrine: Negative for polydipsia and polyuria.         "Elevated blood sugars noted.   Musculoskeletal: Negative for joint swelling.   Psychiatric/Behavioral: Negative for dysphoric mood.         Objective:     patient's weight patient's weight was checked with portable chemo port   Blood pressure 111/64, pulse 79, resp. rate 20, height 5' 6" (1.676 m), weight 66.9 kg (147 lb 8 oz), SpO2 100 %. Body mass index is 23.81 kg/m².  Physical Exam  Constitutional:       General: He is not in acute distress.     Appearance: He is ill-appearing.   HENT:      Head:        Comments: Tracheostomy tube noted in the neck.  Fitting well.  No infection.  Eyes:      General: No scleral icterus.  Cardiovascular:      Rate and Rhythm: Normal rate.   Pulmonary:      Effort: Pulmonary effort is normal.      Breath sounds: Normal breath sounds.   Abdominal:      General: Abdomen is flat.      Comments: Peg tube noted.   Musculoskeletal:      Right lower leg: No edema.      Left lower leg: No edema.   Neurological:      Mental Status: Mental status is at baseline.           Assessment:       1. Essential hypertension    2. History of total thyroidectomy    3. Elevated blood sugar    4. Anemia associated with malignant neoplastic disease    5. Uses feeding tube    6. Aphonia    7. Tracheostomy tube present         Component      Latest Ref Rng & Units 7/11/2022 7/8/2022 1/18/2022   WBC      3.90 - 12.70 K/uL  7.29    RBC      4.60 - 6.20 M/uL  4.06 (L)    Hemoglobin      14.0 - 18.0 g/dL  9.7 (L)    Hematocrit      40.0 - 54.0 %  32.8 (L)    MCV      82 - 98 fL  81 (L)    MCH      27.0 - 31.0 pg  23.9 (L)    MCHC      32.0 - 36.0 g/dL  29.6 (L)    RDW      11.5 - 14.5 %  16.1 (H)    Platelets      150 - 450 K/uL  291    MPV      9.2 - 12.9 fL  12.0    Immature Granulocytes      0.0 - 0.5 %  0.7 (H)    Gran # (ANC)      1.8 - 7.7 K/uL  5.7    Immature Grans (Abs)      0.00 - 0.04 K/uL  0.05 (H)    Lymph #      1.0 - 4.8 K/uL  0.9 (L)    Mono #      0.3 - 1.0 K/uL  0.4    Eos #      0.0 - 0.5 " K/uL  0.2    Baso #      0.00 - 0.20 K/uL  0.02    nRBC      0 /100 WBC  0    Gran %      38.0 - 73.0 %  77.6 (H)    Lymph %      18.0 - 48.0 %  12.3 (L)    Mono %      4.0 - 15.0 %  5.9    Eosinophil %      0.0 - 8.0 %  3.2    Basophil %      0.0 - 1.9 %  0.3    Differential Method        Automated    Sodium      136 - 145 mmol/L  136    Potassium      3.5 - 5.1 mmol/L  4.2    Chloride      95 - 110 mmol/L  102    CO2      23 - 29 mmol/L  27    Glucose      70 - 110 mg/dL  171 (H)    BUN      8 - 23 mg/dL  31 (H)    Creatinine      0.5 - 1.4 mg/dL  1.0    Calcium      8.7 - 10.5 mg/dL  9.5 10.7 (H)   PROTEIN TOTAL      6.0 - 8.4 g/dL  7.8    Albumin      3.5 - 5.2 g/dL  4.3    BILIRUBIN TOTAL      0.1 - 1.0 mg/dL  1.0    Alkaline Phosphatase      55 - 135 U/L  100            Plan:           Essential hypertension  Comments:  Blood pressures are on the low side.  Perhaps me may consider reducing the dosage.  Patient uncomfortable with the thought of reducing the dosage.    History of total thyroidectomy  Comments:  This was as a part of his radical neck surgery.  After that he has been put on levothyroxine and his TSH is supratherapeutic will continue to adjust if patient     Elevated blood sugar  Comments:  Some of the blood sugar levels have been elevated in continue to monitor.  Orders:  -     metFORMIN (GLUCOPHAGE) 500 MG tablet; Take 1 tablet (500 mg total) by mouth 2 (two) times daily with meals.  Dispense: 60 tablet; Refill: 3    Anemia associated with malignant neoplastic disease    Uses feeding tube  Comments:  Patient is being fed Glucerna.  Will start on metformin.  History of diabetes.  He can crush the pill.    Aphonia  Comments:  As a result of tracheostomy and head and neck cancer treatment.  Patient communicates through a scratch pad.    Tracheostomy tube present  Comments:  Tracheostomy tube is doing okay.  No evidence of infection.      Patient's blood pressures are very stable and rather on the  low side.  He is on 100 mg of losartan and perhaps we could make a case of reducing it to 50 mg.  I am not sure if patient is understanding my point of view but he states that he is perfectly fine with current dosage.    His TSH is low but I am not sure if it is supposed to be some sub therapeutic to suppress any new thyroid activity.  His dosage was reduced recently by Oncology.  Will reach out to the oncology and ENT department if his TSH is supposed to be subtherapeutic and where exactly is a thyroid problem.    Patient has recently come under my care at a lot of his history is not very well understood by me.    Patient will try to prior has cut down losartan to half a pill and see how he does.  I would like to give him a follow-up in 3-4 months time for review.    Advised Mr. Batres about age and season appropriate immunizations/ cancer screenings.  Also seasonal influenza vaccine, update on tetanus diphtheria vaccination every 10 years.    He also has elevated blood sugars but currently not on any medications.  Probably the goal is for him to gain some weight.  He is getting Glucerna through thing feeding.     Patient has been advised to watch diet and exercise. Avoid fried and fatty food. Compliance to medications and follow up urged.    I did communicate with multiple providers involved in his care and did understand that he had a total thyroidectomy as a part of his radical neck surgery in January 22. This leaves him at risk for hypothyroidism and he has been on thyroid supplementation.  Will convey this message to patient and continue to lower his thyroid till he reaches a euthyroid status biochemically.  Generally the goal of rendering subtherapeutic TSH is in thyroid cancers.  However he did not have thyroid cancer but he had throat cancer.      Med review done.    Zyprexa as for nausea.  Benadryl is for sleep.  Trazodone is for sleep.    Spent brittany 30 minutes with patient which involved review of pts  medical conditions, labs, medications and with 50% of time face-to-face discussion about medical problems, management and any applicable changes.        Current Outpatient Medications:     acetaminophen (TYLENOL) 325 MG tablet, Take 325 mg by mouth every 6 (six) hours as needed for Pain., Disp: , Rfl:     dexAMETHasone (DECADRON) 4 MG Tab, Take 2 tablets (8 mg total) by mouth once daily. Take as directed on days 2, 3, and 4 of your chemotherapy cycle., Disp: 24 tablet, Rfl: 2    diphenhydrAMINE (BENADRYL) 25 mg capsule, Take 25 mg by mouth every 6 (six) hours as needed for Itching., Disp: , Rfl:     esomeprazole (NEXIUM) 40 MG capsule, Take 40 mg by mouth before breakfast., Disp: , Rfl:     levothyroxine (SYNTHROID) 100 MCG tablet, Take 1 tablet (100 mcg total) by mouth before breakfast., Disp: 30 tablet, Rfl: 11    losartan (COZAAR) 100 MG tablet, TAKE 1 TABLET BY MOUTH EVERY DAY (Patient taking differently: Take 100 mg by mouth every evening.), Disp: 90 tablet, Rfl: 3    OLANZapine (ZYPREXA) 5 MG tablet, Take 1 tablet (5 mg total) by mouth every evening. Take as directed on days 1-4 of your chemotherapy cycle., Disp: 30 tablet, Rfl: 5    ondansetron (ZOFRAN-ODT) 8 MG TbDL, Take 1 tablet (8 mg total) by mouth every 8 (eight) hours as needed (nausea/vomiting)., Disp: 60 tablet, Rfl: 5    traZODone (DESYREL) 50 MG tablet, Take 1 tablet (50 mg total) by mouth nightly as needed for Insomnia., Disp: 30 tablet, Rfl: 2    metFORMIN (GLUCOPHAGE) 500 MG tablet, Take 1 tablet (500 mg total) by mouth 2 (two) times daily with meals., Disp: 60 tablet, Rfl: 3  No current facility-administered medications for this visit.    Facility-Administered Medications Ordered in Other Visits:     lactated ringers infusion, , Intravenous, Continuous, Torsten Hilton MD

## 2022-07-13 NOTE — Clinical Note
Dear Dr Perera and Dr Jadyn OWENS am the PCP for this patient.  He comes for follow-up on advice concerning the thyroid.  His TSH is subnormal but free T4 is within normal range.  Is that the goal of his thyroxine treatment for suppression of any thyroid activity?  Dr. Yesenia DONOVAN

## 2022-07-15 ENCOUNTER — PATIENT MESSAGE (OUTPATIENT)
Dept: FAMILY MEDICINE | Facility: CLINIC | Age: 72
End: 2022-07-15

## 2022-07-15 ENCOUNTER — INFUSION (OUTPATIENT)
Dept: INFUSION THERAPY | Facility: HOSPITAL | Age: 72
End: 2022-07-15
Attending: INTERNAL MEDICINE
Payer: MEDICARE

## 2022-07-15 VITALS
BODY MASS INDEX: 23.24 KG/M2 | DIASTOLIC BLOOD PRESSURE: 60 MMHG | HEART RATE: 77 BPM | OXYGEN SATURATION: 99 % | WEIGHT: 144.63 LBS | SYSTOLIC BLOOD PRESSURE: 103 MMHG | RESPIRATION RATE: 18 BRPM | TEMPERATURE: 98 F | HEIGHT: 66 IN

## 2022-07-15 DIAGNOSIS — C01 PRIMARY CANCER OF BASE OF TONGUE: Primary | ICD-10-CM

## 2022-07-15 PROCEDURE — 96523 IRRIG DRUG DELIVERY DEVICE: CPT

## 2022-07-15 PROCEDURE — 25000003 PHARM REV CODE 250: Performed by: INTERNAL MEDICINE

## 2022-07-15 PROCEDURE — 63600175 PHARM REV CODE 636 W HCPCS: Performed by: INTERNAL MEDICINE

## 2022-07-15 PROCEDURE — A4216 STERILE WATER/SALINE, 10 ML: HCPCS | Performed by: INTERNAL MEDICINE

## 2022-07-15 RX ORDER — HEPARIN 100 UNIT/ML
500 SYRINGE INTRAVENOUS
Status: DISCONTINUED | OUTPATIENT
Start: 2022-07-15 | End: 2022-07-15 | Stop reason: HOSPADM

## 2022-07-15 RX ORDER — SODIUM CHLORIDE 0.9 % (FLUSH) 0.9 %
10 SYRINGE (ML) INJECTION
Status: DISCONTINUED | OUTPATIENT
Start: 2022-07-15 | End: 2022-07-15 | Stop reason: HOSPADM

## 2022-07-15 RX ADMIN — HEPARIN 500 UNITS: 100 SYRINGE at 01:07

## 2022-07-15 RX ADMIN — SODIUM CHLORIDE, PRESERVATIVE FREE 10 ML: 5 INJECTION INTRAVENOUS at 01:07

## 2022-07-15 NOTE — PLAN OF CARE
Problem: Fatigue  Goal: Improved Activity Tolerance  Outcome: Ongoing, Progressing  Intervention: Promote Improved Energy  Flowsheets (Taken 7/15/2022 1345)  Fatigue Management:   paced activity encouraged   fatigue-related activity identified   frequent rest breaks encouraged  Sleep/Rest Enhancement:   regular sleep/rest pattern promoted   reading promoted   relaxation techniques promoted   family presence promoted

## 2022-07-18 ENCOUNTER — OFFICE VISIT (OUTPATIENT)
Dept: HEMATOLOGY/ONCOLOGY | Facility: CLINIC | Age: 72
End: 2022-07-18
Payer: MEDICARE

## 2022-07-18 ENCOUNTER — INFUSION (OUTPATIENT)
Dept: INFUSION THERAPY | Facility: HOSPITAL | Age: 72
End: 2022-07-18
Attending: NURSE PRACTITIONER
Payer: MEDICARE

## 2022-07-18 VITALS
RESPIRATION RATE: 16 BRPM | HEIGHT: 66 IN | DIASTOLIC BLOOD PRESSURE: 54 MMHG | WEIGHT: 139.75 LBS | HEART RATE: 100 BPM | TEMPERATURE: 97 F | BODY MASS INDEX: 22.46 KG/M2 | SYSTOLIC BLOOD PRESSURE: 84 MMHG | OXYGEN SATURATION: 99 %

## 2022-07-18 VITALS
WEIGHT: 140 LBS | OXYGEN SATURATION: 100 % | TEMPERATURE: 99 F | RESPIRATION RATE: 16 BRPM | HEART RATE: 88 BPM | SYSTOLIC BLOOD PRESSURE: 90 MMHG | BODY MASS INDEX: 22.5 KG/M2 | HEIGHT: 66 IN | DIASTOLIC BLOOD PRESSURE: 55 MMHG

## 2022-07-18 DIAGNOSIS — C01 PRIMARY CANCER OF BASE OF TONGUE: Primary | ICD-10-CM

## 2022-07-18 DIAGNOSIS — E86.0 DEHYDRATION: Primary | ICD-10-CM

## 2022-07-18 PROCEDURE — 1101F PT FALLS ASSESS-DOCD LE1/YR: CPT | Mod: CPTII,S$GLB,, | Performed by: NURSE PRACTITIONER

## 2022-07-18 PROCEDURE — 1125F PR PAIN SEVERITY QUANTIFIED, PAIN PRESENT: ICD-10-PCS | Mod: CPTII,S$GLB,, | Performed by: NURSE PRACTITIONER

## 2022-07-18 PROCEDURE — 1125F AMNT PAIN NOTED PAIN PRSNT: CPT | Mod: CPTII,S$GLB,, | Performed by: NURSE PRACTITIONER

## 2022-07-18 PROCEDURE — 3078F DIAST BP <80 MM HG: CPT | Mod: CPTII,S$GLB,, | Performed by: NURSE PRACTITIONER

## 2022-07-18 PROCEDURE — 3288F PR FALLS RISK ASSESSMENT DOCUMENTED: ICD-10-PCS | Mod: CPTII,S$GLB,, | Performed by: NURSE PRACTITIONER

## 2022-07-18 PROCEDURE — 96360 HYDRATION IV INFUSION INIT: CPT

## 2022-07-18 PROCEDURE — 1159F PR MEDICATION LIST DOCUMENTED IN MEDICAL RECORD: ICD-10-PCS | Mod: CPTII,S$GLB,, | Performed by: NURSE PRACTITIONER

## 2022-07-18 PROCEDURE — 3008F BODY MASS INDEX DOCD: CPT | Mod: CPTII,S$GLB,, | Performed by: NURSE PRACTITIONER

## 2022-07-18 PROCEDURE — 3060F PR POS MICROALBUMINURIA RESULT DOCUMENTED/REVIEW: ICD-10-PCS | Mod: CPTII,S$GLB,, | Performed by: NURSE PRACTITIONER

## 2022-07-18 PROCEDURE — 63600175 PHARM REV CODE 636 W HCPCS: Performed by: NURSE PRACTITIONER

## 2022-07-18 PROCEDURE — 1160F PR REVIEW ALL MEDS BY PRESCRIBER/CLIN PHARMACIST DOCUMENTED: ICD-10-PCS | Mod: CPTII,S$GLB,, | Performed by: NURSE PRACTITIONER

## 2022-07-18 PROCEDURE — 4010F ACE/ARB THERAPY RXD/TAKEN: CPT | Mod: CPTII,S$GLB,, | Performed by: NURSE PRACTITIONER

## 2022-07-18 PROCEDURE — 3060F POS MICROALBUMINURIA REV: CPT | Mod: CPTII,S$GLB,, | Performed by: NURSE PRACTITIONER

## 2022-07-18 PROCEDURE — 3066F PR DOCUMENTATION OF TREATMENT FOR NEPHROPATHY: ICD-10-PCS | Mod: CPTII,S$GLB,, | Performed by: NURSE PRACTITIONER

## 2022-07-18 PROCEDURE — 3044F PR MOST RECENT HEMOGLOBIN A1C LEVEL <7.0%: ICD-10-PCS | Mod: CPTII,S$GLB,, | Performed by: NURSE PRACTITIONER

## 2022-07-18 PROCEDURE — 99999 PR PBB SHADOW E&M-EST. PATIENT-LVL V: ICD-10-PCS | Mod: PBBFAC,,, | Performed by: NURSE PRACTITIONER

## 2022-07-18 PROCEDURE — 3066F NEPHROPATHY DOC TX: CPT | Mod: CPTII,S$GLB,, | Performed by: NURSE PRACTITIONER

## 2022-07-18 PROCEDURE — 1160F RVW MEDS BY RX/DR IN RCRD: CPT | Mod: CPTII,S$GLB,, | Performed by: NURSE PRACTITIONER

## 2022-07-18 PROCEDURE — 1101F PR PT FALLS ASSESS DOC 0-1 FALLS W/OUT INJ PAST YR: ICD-10-PCS | Mod: CPTII,S$GLB,, | Performed by: NURSE PRACTITIONER

## 2022-07-18 PROCEDURE — 99214 PR OFFICE/OUTPT VISIT, EST, LEVL IV, 30-39 MIN: ICD-10-PCS | Mod: S$GLB,,, | Performed by: NURSE PRACTITIONER

## 2022-07-18 PROCEDURE — 3044F HG A1C LEVEL LT 7.0%: CPT | Mod: CPTII,S$GLB,, | Performed by: NURSE PRACTITIONER

## 2022-07-18 PROCEDURE — 3078F PR MOST RECENT DIASTOLIC BLOOD PRESSURE < 80 MM HG: ICD-10-PCS | Mod: CPTII,S$GLB,, | Performed by: NURSE PRACTITIONER

## 2022-07-18 PROCEDURE — 4010F PR ACE/ARB THEARPY RXD/TAKEN: ICD-10-PCS | Mod: CPTII,S$GLB,, | Performed by: NURSE PRACTITIONER

## 2022-07-18 PROCEDURE — 3074F SYST BP LT 130 MM HG: CPT | Mod: CPTII,S$GLB,, | Performed by: NURSE PRACTITIONER

## 2022-07-18 PROCEDURE — 1159F MED LIST DOCD IN RCRD: CPT | Mod: CPTII,S$GLB,, | Performed by: NURSE PRACTITIONER

## 2022-07-18 PROCEDURE — 3074F PR MOST RECENT SYSTOLIC BLOOD PRESSURE < 130 MM HG: ICD-10-PCS | Mod: CPTII,S$GLB,, | Performed by: NURSE PRACTITIONER

## 2022-07-18 PROCEDURE — 99214 OFFICE O/P EST MOD 30 MIN: CPT | Mod: S$GLB,,, | Performed by: NURSE PRACTITIONER

## 2022-07-18 PROCEDURE — 3008F PR BODY MASS INDEX (BMI) DOCUMENTED: ICD-10-PCS | Mod: CPTII,S$GLB,, | Performed by: NURSE PRACTITIONER

## 2022-07-18 PROCEDURE — 25000003 PHARM REV CODE 250: Performed by: NURSE PRACTITIONER

## 2022-07-18 PROCEDURE — 3288F FALL RISK ASSESSMENT DOCD: CPT | Mod: CPTII,S$GLB,, | Performed by: NURSE PRACTITIONER

## 2022-07-18 PROCEDURE — 99999 PR PBB SHADOW E&M-EST. PATIENT-LVL V: CPT | Mod: PBBFAC,,, | Performed by: NURSE PRACTITIONER

## 2022-07-18 RX ORDER — HEPARIN 100 UNIT/ML
500 SYRINGE INTRAVENOUS
Status: CANCELLED | OUTPATIENT
Start: 2022-07-18

## 2022-07-18 RX ORDER — HEPARIN 100 UNIT/ML
500 SYRINGE INTRAVENOUS
Status: DISCONTINUED | OUTPATIENT
Start: 2022-07-18 | End: 2022-07-18 | Stop reason: HOSPADM

## 2022-07-18 RX ORDER — SODIUM CHLORIDE 0.9 % (FLUSH) 0.9 %
10 SYRINGE (ML) INJECTION
Status: CANCELLED | OUTPATIENT
Start: 2022-07-18

## 2022-07-18 RX ORDER — SODIUM CHLORIDE 0.9 % (FLUSH) 0.9 %
10 SYRINGE (ML) INJECTION
Status: DISCONTINUED | OUTPATIENT
Start: 2022-07-18 | End: 2022-07-18 | Stop reason: HOSPADM

## 2022-07-18 RX ADMIN — SODIUM CHLORIDE 500 ML: 0.9 INJECTION, SOLUTION INTRAVENOUS at 03:07

## 2022-07-18 RX ADMIN — HEPARIN 500 UNITS: 100 SYRINGE at 04:07

## 2022-07-18 NOTE — PROGRESS NOTES
Service Date:  7/18/22    Chief Complaint: No chief complaint on file.    Cyrus Batres Jr. is a 72 y.o. male referred here by Dr. Noel for laryngeal cancer.      Oncological history is as followed:  10/30/2020: completion of IMRT to larynx and bilateral necks via SiB totaling 6996 cGy.  Diagnosed as a T2 N0 M0 lesion at that time, stage II.   3/16/2021: SML left VF resection encompassing AC; margins negative on frozen, some margins positive on permanent  4/1/2021: SML KTP left transmuscular cordectomy, anterior right sublig resection encompassing AC; left TVF clear with negative margin;  right TVF frozen margins negative but positive on permanent  5/6/2021: SML KTP transmusc resection anterior right TVF; negative for carcinoma  1/6/2022: s/p Total laryngectomy; T4aN0 +PNI; no adjuvant radiation therapy or systemic therapy given    Now with malignancy at the base of the tongue [id number]. Negative mets on PET scan. Patient is not a candidate for further radiation therapy and does not want surgery as he would need a glossectomy.     6/23/22:  Patient presents today for chemotherapy education.  Patient and wife would like 2nd opinion with head and neck oncologist at [facility].  His only complaint today is insomnia    7/19/22:  He presents today with complaints of postural dizziness.  He is noted to be hypotensive and tachycardic upon arrival    Review of Systems   Constitutional: Negative.    HENT: Negative.    Eyes: Negative.    Respiratory: Negative.    Cardiovascular: Negative.    Gastrointestinal: Negative.    Genitourinary: Negative.    Musculoskeletal: Negative.    Skin: Negative.    Neurological: Positive for dizziness.   Endo/Heme/Allergies: Negative.    Psychiatric/Behavioral: Negative.         Current Outpatient Medications   Medication Instructions    acetaminophen (TYLENOL) 325 mg, Oral, Every 6 hours PRN    dexAMETHasone (DECADRON) 8 mg, Oral, Daily, Take as directed on days 2, 3, and 4 of your  chemotherapy cycle.    diphenhydrAMINE (BENADRYL) 25 mg, Oral, Every 6 hours PRN    esomeprazole (NEXIUM) 40 mg, Oral, Before breakfast    levothyroxine (SYNTHROID) 100 mcg, Oral, Before breakfast    losartan (COZAAR) 100 MG tablet TAKE 1 TABLET BY MOUTH EVERY DAY    metFORMIN (GLUCOPHAGE) 500 mg, Oral, 2 times daily with meals    OLANZapine (ZYPREXA) 5 mg, Oral, Nightly, Take as directed on days 1-4 of your chemotherapy cycle.    ondansetron (ZOFRAN-ODT) 8 mg, Oral, Every 8 hours PRN    traZODone (DESYREL) 50 mg, Oral, Nightly PRN        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    Postoperative hypothyroidism 7/7/2022        Past Surgical History:   Procedure Laterality Date    DIRECT LARYNGOBRONCHOSCOPY N/A 12/27/2021    Procedure: LARYNGOSCOPY, DIRECT, WITH BRONCHOSCOPY;  Surgeon: Flex Espinosa MD;  Location: [address];  Service: ENT;  Laterality: N/A;    DISSECTION OF NECK Bilateral 1/6/2022    Procedure: DISSECTION, NECK;  Surgeon: Jesse James MD;  Location: [address];  Service: ENT;  Laterality: Bilateral;    FLAP PROCEDURE Right 1/6/2022    Procedure: CREATION, FREE FLAP;  Surgeon: Alise Hart MD;  Location: [address];  Service: ENT;  Laterality: Right;  Ischemic start 1351  Ischemic stop 1502    INSERTION OF TUNNELED CENTRAL VENOUS CATHETER (CVC) WITH SUBCUTANEOUS PORT N/A 6/9/2022    Procedure: [id number]-M-CATH;  Surgeon: Jesus Viear MD;  Location: [facility];  Service: General;  Laterality: N/A;    LARYNGECTOMY N/A 1/6/2022    Procedure: LARYNGECTOMY;  Surgeon: Jesse James MD;  Location: [address];  Service: ENT;  Laterality: N/A;    LARYNGOSCOPY N/A 8/4/2020    Procedure: Suspension microlaryngoscopy with biopsy, possible KTP laser treatment/excision;  Surgeon:  Stew Noel MD;  [facility];  Service: ENT;  Laterality: N/A;  Microscope, telescopes, tower, microinstruments, KTP laser, rep conf# [id number] IC 7/28.    LARYNGOSCOPY N/A 3/16/2021    Procedure: Suspension microlaryngoscopy with excision of lesion, possible CO2 laser;  Surgeon: Stew Noel MD;  [facility];  Service: ENT;  Laterality: N/A;  Microscope, telescopes, tower, microinstruments, CO2 laser, rep conf# [id number] IC 3/4.    LARYNGOSCOPY N/A 4/1/2021    Procedure: Suspension microlaryngoscopy with KTP laser excision of lesion;  Surgeon: Stew Noel MD;  [facility];  Service: ENT;  Laterality: N/A;  Microscope, telescopes, tower, microinstruments, 70 degree scope, vocal fold , KTP laser, rep conf# [id number] BC    LARYNGOSCOPY N/A 12/9/2021    Procedure: Suspension microlaryngoscopy with biopsy;  Surgeon: Stew Noel MD;  [facility];  Service: ENT;  Laterality: N/A;  Microscope, telescopes, tower, microinstruments    LARYNGOSCOPY N/A 1/6/2022    Procedure: LARYNGOSCOPY;  Surgeon: Jesse James MD;  Location: [facility] OR [address] [place] [address];  Service: ENT;  Laterality: N/A;    LARYNGOSCOPY N/A 4/27/2022    Procedure: LARYNGOSCOPY WITH BIOPSY;  Surgeon: Jesse James MD;  [facility];  Service: ENT;  Laterality: N/A;    MICROLARYNGOSCOPY N/A 3/17/2020    Procedure: MICROLARYNGOSCOPY;  Surgeon: Jung Xiao MD;  Location: [facility];  Service: ENT;  Laterality: N/A;  Laser Microlaryngoscopy  NEED TO SCHEDULE LASER from Pending sale to [facility] [phone]    REIMPLANTATION OF PARATHYROID TISSUE N/A 1/6/2022    Procedure: REIMPLANTATION, PARATHYROID TISSUE;  Surgeon: Jesse James MD;  [facility];  Service: ENT;  Laterality: N/A;    THYROIDECTOMY  1/6/2022    Procedure: THYROIDECTOMY;  Surgeon: Jesse James MD;  Location: [facility] OR [address] [place] [address];  Service: ENT;;    TRACHEOSTOMY N/A  "12/27/2021    Procedure: CREATION, TRACHEOSTOMY;  Surgeon: Flex Espinosa MD;  Location: [facility] OR [address] [place] [address];  Service: ENT;  Laterality: N/A;        Family History   Problem Relation Age of Onset    Abnormal EKG Mother     Diabetes Father     Heart disease Father     Hypertension Father        Social History     Tobacco Use    Smoking status: Never Smoker    Smokeless tobacco: Never Used   Substance Use Topics    Alcohol use: Not Currently     Comment: occasional    Drug use: No         Vitals:    07/18/22 1512   BP: (!) 84/54   BP Location: Right arm   Patient Position: Sitting   BP Method: Medium (Automatic)   Pulse: 100   Resp: 16   Temp: 96.7 °F (35.9 °C)   TempSrc: Temporal   SpO2: 99%   Weight: 63.4 kg (139 lb 12.4 oz)   Height: 5' 6" (1.676 m)       Physical Exam:Physical Exam  Vitals and nursing note reviewed.   Constitutional:       Appearance: Normal appearance.   HENT:      Head: Normocephalic and atraumatic.      Nose: Nose normal.      Mouth/Throat:      Mouth: Mucous membranes are moist.      Pharynx: Oropharynx is clear.   Eyes:      Extraocular Movements: Extraocular movements intact.      Conjunctiva/sclera: Conjunctivae normal.   Cardiovascular:      Rate and Rhythm: Normal rate and regular rhythm.      Heart sounds: Normal heart sounds.   Pulmonary:      Effort: Pulmonary effort is normal.      Breath sounds: Normal breath sounds.   Abdominal:      General: Abdomen is flat. Bowel sounds are normal.      Palpations: Abdomen is soft.   Musculoskeletal:         General: Normal range of motion.      Cervical back: Normal range of motion and neck supple.   Skin:     General: Skin is warm and dry.   Neurological:      General: No focal deficit present.      Mental Status: He is alert and oriented to person, place, and time. Mental status is at baseline.   Psychiatric:         Mood and Affect: Mood normal.          Labs:  Lab Results   Component Value Date    WBC 12.19 07/15/2022    RBC 4.12 (L) " 07/15/2022    HGB 9.9 (L) 07/15/2022    HCT 32.6 (L) 07/15/2022    MCV 79 (L) 07/15/2022    MCH 24.0 (L) 07/15/2022    MCHC 30.4 (L) 07/15/2022    RDW 16.8 (H) 07/15/2022     07/15/2022    MPV 11.7 07/15/2022    GRAN 11.6 (H) 07/15/2022    GRAN 95.1 (H) 07/15/2022    LYMPH 0.4 (L) 07/15/2022    LYMPH 3.5 (L) 07/15/2022    MONO 0.1 (L) 07/15/2022    MONO 0.9 (L) 07/15/2022    EOS 0.0 07/15/2022    BASO 0.00 07/15/2022    EOSINOPHIL 0.0 07/15/2022    BASOPHIL 0.0 07/15/2022     Sodium   Date Value Ref Range Status   07/15/2022 134 (L) 136 - 145 mmol/L Final     Potassium   Date Value Ref Range Status   07/15/2022 4.4 3.5 - 5.1 mmol/L Final     Chloride   Date Value Ref Range Status   07/15/2022 101 95 - 110 mmol/L Final     CO2   Date Value Ref Range Status   07/15/2022 27 23 - 29 mmol/L Final     Glucose   Date Value Ref Range Status   07/15/2022 251 (H) 70 - 110 mg/dL Final     BUN   Date Value Ref Range Status   07/15/2022 43 (H) 8 - 23 mg/dL Final     Creatinine   Date Value Ref Range Status   07/15/2022 1.1 0.5 - 1.4 mg/dL Final     Calcium   Date Value Ref Range Status   07/15/2022 9.4 8.7 - 10.5 mg/dL Final     Total Protein   Date Value Ref Range Status   07/15/2022 6.9 6.0 - 8.4 g/dL Final     Albumin   Date Value Ref Range Status   07/15/2022 4.0 3.5 - 5.2 g/dL Final   11/18/2020 3.7 3.6 - 5.1 g/dL Final     Comment:     For additional information, please refer to   http://education.[employer].Four[employer] IP/faq/UTR531 (This link is   being provided for informational/ educational purposes only.)  This test was developed and its analytical performance   characteristics have been determined by [employer]. It has not been cleared or approved by the   [place] Food and Drug Administration. This assay has been validated   pursuant to the CLIA regulations and is used for clinical   purposes.  @ Test Performed By:  [employer] [facility]  Yo Cortes M.D., Laboratory  Director  [address] [place] [address]  [facility]  [id number]       Total Bilirubin   Date Value Ref Range Status   07/15/2022 1.1 (H) 0.1 - 1.0 mg/dL Final     Comment:     For infants and newborns, interpretation of results should be based  on gestational age, weight and in agreement with clinical  observations.    Premature Infant recommended reference ranges:  Up to 24 hours.............<8.0 mg/dL  Up to 48 hours............<12.0 mg/dL  3-5 days..................<15.0 mg/dL  6-29 days.................<15.0 mg/dL       Alkaline Phosphatase   Date Value Ref Range Status   07/15/2022 78 55 - 135 U/L Final     AST   Date Value Ref Range Status   07/15/2022 16 10 - 40 U/L Final     ALT   Date Value Ref Range Status   07/15/2022 19 10 - 44 U/L Final     Anion Gap   Date Value Ref Range Status   07/15/2022 6 (L) 8 - 16 mmol/L Final     eGFR if    Date Value Ref Range Status   07/15/2022 >60.0 >60 mL/min/1.73 m^2 Final     eGFR if non    Date Value Ref Range Status   07/15/2022 >60.0 >60 mL/min/1.73 m^2 Final     Comment:     Calculation used to obtain the estimated glomerular filtration  rate (eGFR) is the CKD-EPI equation.          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   -patient was evaluated by head and neck oncologist at [facility]    Dehydration:  500 cc normal saline today  1 L normal saline over 2 hours on Wednesday  Educated patient on the importance of maintaining good hydration  Instructed patient to increase hydration through PEG tube    Iron deficiency anemia:  Continue Venofer  Monitor closely      nausea related to chemotherapy:  zyprexa  as directed on days 1-4 of your chemotherapy cycle  Zofran 8 mg every 6 hours as needed    Nutrition:  Educated on the importance of maintaining healthy diet with adequate hydration    Malignancy related pain:  He denies pain at this moment  Will notify us if pain develops      Hypothyroidism:  Continue current medication regimen  Will monitor thyroid function carefully    Insomnia:  Trazodone p.o. 50 mg q.h.s. as needed

## 2022-07-18 NOTE — PLAN OF CARE
Problem: Fatigue  Goal: Improved Activity Tolerance  Outcome: Ongoing, Progressing  Intervention: Promote Improved Energy  Flowsheets (Taken 7/18/2022 1603)  Fatigue Management: frequent rest breaks encouraged  Sleep/Rest Enhancement:   regular sleep/rest pattern promoted   relaxation techniques promoted  Activity Management: Ambulated -L4

## 2022-07-20 ENCOUNTER — INFUSION (OUTPATIENT)
Dept: INFUSION THERAPY | Facility: HOSPITAL | Age: 72
End: 2022-07-20
Attending: STUDENT IN AN ORGANIZED HEALTH CARE EDUCATION/TRAINING PROGRAM
Payer: MEDICARE

## 2022-07-20 VITALS
SYSTOLIC BLOOD PRESSURE: 107 MMHG | HEART RATE: 74 BPM | WEIGHT: 141.69 LBS | RESPIRATION RATE: 18 BRPM | BODY MASS INDEX: 22.77 KG/M2 | TEMPERATURE: 98 F | DIASTOLIC BLOOD PRESSURE: 65 MMHG | OXYGEN SATURATION: 100 % | HEIGHT: 66 IN

## 2022-07-20 DIAGNOSIS — D50.0 IRON DEFICIENCY ANEMIA DUE TO CHRONIC BLOOD LOSS: ICD-10-CM

## 2022-07-20 DIAGNOSIS — E86.0 DEHYDRATION: Primary | ICD-10-CM

## 2022-07-20 DIAGNOSIS — C01 PRIMARY CANCER OF BASE OF TONGUE: ICD-10-CM

## 2022-07-20 DIAGNOSIS — C32.9 LARYNX CANCER: ICD-10-CM

## 2022-07-20 PROCEDURE — 25000003 PHARM REV CODE 250: Performed by: STUDENT IN AN ORGANIZED HEALTH CARE EDUCATION/TRAINING PROGRAM

## 2022-07-20 PROCEDURE — 63600175 PHARM REV CODE 636 W HCPCS: Performed by: STUDENT IN AN ORGANIZED HEALTH CARE EDUCATION/TRAINING PROGRAM

## 2022-07-20 PROCEDURE — 25000003 PHARM REV CODE 250: Performed by: NURSE PRACTITIONER

## 2022-07-20 PROCEDURE — 96361 HYDRATE IV INFUSION ADD-ON: CPT

## 2022-07-20 PROCEDURE — 96365 THER/PROPH/DIAG IV INF INIT: CPT

## 2022-07-20 PROCEDURE — 63600175 PHARM REV CODE 636 W HCPCS: Performed by: INTERNAL MEDICINE

## 2022-07-20 RX ORDER — HEPARIN 100 UNIT/ML
500 SYRINGE INTRAVENOUS
Status: DISCONTINUED | OUTPATIENT
Start: 2022-07-20 | End: 2022-07-20 | Stop reason: HOSPADM

## 2022-07-20 RX ORDER — HEPARIN 100 UNIT/ML
500 SYRINGE INTRAVENOUS
Status: CANCELLED | OUTPATIENT
Start: 2022-07-20

## 2022-07-20 RX ORDER — SODIUM CHLORIDE 0.9 % (FLUSH) 0.9 %
10 SYRINGE (ML) INJECTION
Status: CANCELLED | OUTPATIENT
Start: 2022-07-20

## 2022-07-20 RX ORDER — SODIUM CHLORIDE 0.9 % (FLUSH) 0.9 %
10 SYRINGE (ML) INJECTION
Status: DISCONTINUED | OUTPATIENT
Start: 2022-07-20 | End: 2022-07-20 | Stop reason: HOSPADM

## 2022-07-20 RX ORDER — SODIUM CHLORIDE 0.9 % (FLUSH) 0.9 %
10 SYRINGE (ML) INJECTION
Status: DISCONTINUED | OUTPATIENT
Start: 2022-07-20 | End: 2022-07-20

## 2022-07-20 RX ORDER — HEPARIN 100 UNIT/ML
500 SYRINGE INTRAVENOUS
Status: DISCONTINUED | OUTPATIENT
Start: 2022-07-20 | End: 2022-07-20

## 2022-07-20 RX ADMIN — IRON SUCROSE: 20 INJECTION, SOLUTION INTRAVENOUS at 01:07

## 2022-07-20 RX ADMIN — SODIUM CHLORIDE 1000 ML: 0.9 INJECTION, SOLUTION INTRAVENOUS at 01:07

## 2022-07-20 RX ADMIN — HEPARIN 500 UNITS: 100 SYRINGE at 03:07

## 2022-07-20 NOTE — PLAN OF CARE
Problem: Fatigue  Goal: Improved Activity Tolerance  Outcome: Ongoing, Progressing  Intervention: Promote Improved Energy  Flowsheets (Taken 7/20/2022 9564)  Fatigue Management: frequent rest breaks encouraged

## 2022-07-24 ENCOUNTER — PATIENT MESSAGE (OUTPATIENT)
Dept: HEMATOLOGY/ONCOLOGY | Facility: CLINIC | Age: 72
End: 2022-07-24
Payer: MEDICARE

## 2022-07-24 ENCOUNTER — PATIENT MESSAGE (OUTPATIENT)
Dept: CARDIOLOGY | Facility: CLINIC | Age: 72
End: 2022-07-24
Payer: MEDICARE

## 2022-07-25 ENCOUNTER — PATIENT MESSAGE (OUTPATIENT)
Dept: FAMILY MEDICINE | Facility: CLINIC | Age: 72
End: 2022-07-25

## 2022-07-25 DIAGNOSIS — C01 PRIMARY CANCER OF BASE OF TONGUE: Primary | ICD-10-CM

## 2022-07-25 RX ORDER — INFANT FORM.IRON LAC-F/DHA/ARA 3.1 G/1
5 POWDER (GRAM) ORAL
Qty: 150 EACH | Refills: 5 | Status: SHIPPED | OUTPATIENT
Start: 2022-07-25 | End: 2022-10-13

## 2022-07-26 ENCOUNTER — TELEPHONE (OUTPATIENT)
Dept: HEMATOLOGY/ONCOLOGY | Facility: CLINIC | Age: 72
End: 2022-07-26
Payer: MEDICARE

## 2022-07-26 ENCOUNTER — PATIENT MESSAGE (OUTPATIENT)
Dept: HEMATOLOGY/ONCOLOGY | Facility: CLINIC | Age: 72
End: 2022-07-26
Payer: MEDICARE

## 2022-07-26 ENCOUNTER — HOSPITAL ENCOUNTER (OUTPATIENT)
Dept: RADIOLOGY | Facility: HOSPITAL | Age: 72
Discharge: HOME | End: 2022-07-26
Attending: INTERNAL MEDICINE
Payer: MEDICARE

## 2022-07-26 ENCOUNTER — PATIENT MESSAGE (OUTPATIENT)
Dept: FAMILY MEDICINE | Facility: CLINIC | Age: 72
End: 2022-07-26

## 2022-07-26 ENCOUNTER — OFFICE VISIT (OUTPATIENT)
Dept: FAMILY MEDICINE | Facility: CLINIC | Age: 72
End: 2022-07-26
Payer: MEDICARE

## 2022-07-26 VITALS
HEIGHT: 66 IN | SYSTOLIC BLOOD PRESSURE: 120 MMHG | HEART RATE: 110 BPM | TEMPERATURE: 101 F | WEIGHT: 137 LBS | DIASTOLIC BLOOD PRESSURE: 73 MMHG | BODY MASS INDEX: 22.02 KG/M2

## 2022-07-26 DIAGNOSIS — C80.1 ANEMIA ASSOCIATED WITH MALIGNANT NEOPLASTIC DISEASE: ICD-10-CM

## 2022-07-26 DIAGNOSIS — R50.9 FEVER, UNSPECIFIED FEVER CAUSE: ICD-10-CM

## 2022-07-26 DIAGNOSIS — J20.9 ACUTE BRONCHITIS, UNSPECIFIED ORGANISM: ICD-10-CM

## 2022-07-26 DIAGNOSIS — R09.89 CHEST CONGESTION: Primary | ICD-10-CM

## 2022-07-26 DIAGNOSIS — R09.89 CHEST CONGESTION: ICD-10-CM

## 2022-07-26 DIAGNOSIS — D63.0 ANEMIA ASSOCIATED WITH MALIGNANT NEOPLASTIC DISEASE: ICD-10-CM

## 2022-07-26 DIAGNOSIS — C32.9 LARYNX CANCER: ICD-10-CM

## 2022-07-26 DIAGNOSIS — D72.819 LEUKOPENIA, UNSPECIFIED TYPE: ICD-10-CM

## 2022-07-26 LAB
CTP QC/QA: YES
SARS-COV-2 RDRP RESP QL NAA+PROBE: NEGATIVE

## 2022-07-26 PROCEDURE — 3066F NEPHROPATHY DOC TX: CPT | Mod: CPTII,S$GLB,, | Performed by: INTERNAL MEDICINE

## 2022-07-26 PROCEDURE — 3066F PR DOCUMENTATION OF TREATMENT FOR NEPHROPATHY: ICD-10-PCS | Mod: CPTII,S$GLB,, | Performed by: INTERNAL MEDICINE

## 2022-07-26 PROCEDURE — 1126F PR PAIN SEVERITY QUANTIFIED, NO PAIN PRESENT: ICD-10-PCS | Mod: CPTII,S$GLB,, | Performed by: INTERNAL MEDICINE

## 2022-07-26 PROCEDURE — U0002 COVID-19 LAB TEST NON-CDC: HCPCS | Mod: QW,S$GLB,, | Performed by: INTERNAL MEDICINE

## 2022-07-26 PROCEDURE — 3060F PR POS MICROALBUMINURIA RESULT DOCUMENTED/REVIEW: ICD-10-PCS | Mod: CPTII,S$GLB,, | Performed by: INTERNAL MEDICINE

## 2022-07-26 PROCEDURE — 3044F PR MOST RECENT HEMOGLOBIN A1C LEVEL <7.0%: ICD-10-PCS | Mod: CPTII,S$GLB,, | Performed by: INTERNAL MEDICINE

## 2022-07-26 PROCEDURE — 99214 PR OFFICE/OUTPT VISIT, EST, LEVL IV, 30-39 MIN: ICD-10-PCS | Mod: S$GLB,,, | Performed by: INTERNAL MEDICINE

## 2022-07-26 PROCEDURE — 3074F SYST BP LT 130 MM HG: CPT | Mod: CPTII,S$GLB,, | Performed by: INTERNAL MEDICINE

## 2022-07-26 PROCEDURE — 4010F PR ACE/ARB THEARPY RXD/TAKEN: ICD-10-PCS | Mod: CPTII,S$GLB,, | Performed by: INTERNAL MEDICINE

## 2022-07-26 PROCEDURE — 3078F DIAST BP <80 MM HG: CPT | Mod: CPTII,S$GLB,, | Performed by: INTERNAL MEDICINE

## 2022-07-26 PROCEDURE — 3060F POS MICROALBUMINURIA REV: CPT | Mod: CPTII,S$GLB,, | Performed by: INTERNAL MEDICINE

## 2022-07-26 PROCEDURE — 3074F PR MOST RECENT SYSTOLIC BLOOD PRESSURE < 130 MM HG: ICD-10-PCS | Mod: CPTII,S$GLB,, | Performed by: INTERNAL MEDICINE

## 2022-07-26 PROCEDURE — 71046 X-RAY EXAM CHEST 2 VIEWS: CPT | Mod: TC

## 2022-07-26 PROCEDURE — 1160F PR REVIEW ALL MEDS BY PRESCRIBER/CLIN PHARMACIST DOCUMENTED: ICD-10-PCS | Mod: CPTII,S$GLB,, | Performed by: INTERNAL MEDICINE

## 2022-07-26 PROCEDURE — 1159F PR MEDICATION LIST DOCUMENTED IN MEDICAL RECORD: ICD-10-PCS | Mod: CPTII,S$GLB,, | Performed by: INTERNAL MEDICINE

## 2022-07-26 PROCEDURE — 1159F MED LIST DOCD IN RCRD: CPT | Mod: CPTII,S$GLB,, | Performed by: INTERNAL MEDICINE

## 2022-07-26 PROCEDURE — 1160F RVW MEDS BY RX/DR IN RCRD: CPT | Mod: CPTII,S$GLB,, | Performed by: INTERNAL MEDICINE

## 2022-07-26 PROCEDURE — 3008F BODY MASS INDEX DOCD: CPT | Mod: CPTII,S$GLB,, | Performed by: INTERNAL MEDICINE

## 2022-07-26 PROCEDURE — 1126F AMNT PAIN NOTED NONE PRSNT: CPT | Mod: CPTII,S$GLB,, | Performed by: INTERNAL MEDICINE

## 2022-07-26 PROCEDURE — 3008F PR BODY MASS INDEX (BMI) DOCUMENTED: ICD-10-PCS | Mod: CPTII,S$GLB,, | Performed by: INTERNAL MEDICINE

## 2022-07-26 PROCEDURE — 3078F PR MOST RECENT DIASTOLIC BLOOD PRESSURE < 80 MM HG: ICD-10-PCS | Mod: CPTII,S$GLB,, | Performed by: INTERNAL MEDICINE

## 2022-07-26 PROCEDURE — U0002: ICD-10-PCS | Mod: QW,S$GLB,, | Performed by: INTERNAL MEDICINE

## 2022-07-26 PROCEDURE — 99214 OFFICE O/P EST MOD 30 MIN: CPT | Mod: S$GLB,,, | Performed by: INTERNAL MEDICINE

## 2022-07-26 PROCEDURE — 3044F HG A1C LEVEL LT 7.0%: CPT | Mod: CPTII,S$GLB,, | Performed by: INTERNAL MEDICINE

## 2022-07-26 PROCEDURE — 4010F ACE/ARB THERAPY RXD/TAKEN: CPT | Mod: CPTII,S$GLB,, | Performed by: INTERNAL MEDICINE

## 2022-07-26 RX ORDER — AMOXICILLIN AND CLAVULANATE POTASSIUM 200; 28.5 MG/5ML; MG/5ML
400 POWDER, FOR SUSPENSION ORAL EVERY 12 HOURS
Qty: 100 ML | Refills: 0 | Status: SHIPPED | OUTPATIENT
Start: 2022-07-26 | End: 2022-07-26 | Stop reason: SDUPTHER

## 2022-07-26 RX ORDER — PREDNISONE 5 MG/ML
20 SOLUTION ORAL DAILY
Qty: 100 ML | Refills: 0 | Status: SHIPPED | OUTPATIENT
Start: 2022-07-26 | End: 2022-07-26 | Stop reason: SDUPTHER

## 2022-07-26 RX ORDER — AMOXICILLIN AND CLAVULANATE POTASSIUM 200; 28.5 MG/5ML; MG/5ML
400 POWDER, FOR SUSPENSION ORAL EVERY 12 HOURS
Qty: 100 ML | Refills: 0 | Status: SHIPPED | OUTPATIENT
Start: 2022-07-26 | End: 2022-09-01

## 2022-07-26 RX ORDER — PREDNISONE 5 MG/ML
20 SOLUTION ORAL DAILY
Qty: 100 ML | Refills: 0 | Status: SHIPPED | OUTPATIENT
Start: 2022-07-26 | End: 2022-09-01

## 2022-07-26 RX ORDER — CEFDINIR 250 MG/5ML
250 POWDER, FOR SUSPENSION ORAL EVERY 12 HOURS
Qty: 70 ML | Refills: 0 | Status: SHIPPED | OUTPATIENT
Start: 2022-07-26 | End: 2022-09-01

## 2022-07-26 RX ORDER — PROMETHAZINE HYDROCHLORIDE AND DEXTROMETHORPHAN HYDROBROMIDE 6.25; 15 MG/5ML; MG/5ML
5 SYRUP ORAL EVERY 8 HOURS PRN
Qty: 120 ML | Refills: 1 | Status: SHIPPED | OUTPATIENT
Start: 2022-07-26 | End: 2022-08-19

## 2022-07-26 RX ORDER — PROMETHAZINE HYDROCHLORIDE AND DEXTROMETHORPHAN HYDROBROMIDE 6.25; 15 MG/5ML; MG/5ML
5 SYRUP ORAL EVERY 8 HOURS PRN
Qty: 120 ML | Refills: 1 | Status: SHIPPED | OUTPATIENT
Start: 2022-07-26 | End: 2022-07-26 | Stop reason: SDUPTHER

## 2022-07-26 NOTE — PROGRESS NOTES
Subjective:       Patient ID: Cyrus Batres Jr. is a 72 y.o. male.    Chief Complaint: URI, Cough, and Fever    Patient is a 75-year-old male who comes for same-day appointment stating that he has chest congestion and cough.  He is not feeling well.  He has felt feverish.  He does not recall any contact with any COVID patients.  He is not tested himself either.    History of throat cancer and status post laryngectomy, total thyroidectomy has been noted.  He is on a suppressive dose of thyroxine.    History of atrial fibrillation also has been noted.    No family member is sick at this point.    Cough  This is a new problem. The current episode started in the past 7 days. The problem has been gradually worsening. The problem occurs constantly. The cough is productive of sputum. Associated symptoms include chills, a fever and nasal congestion. Pertinent negatives include no chest pain, ear pain, eye redness, headaches, hemoptysis, rash, sore throat or wheezing. Nothing aggravates the symptoms. He has tried nothing for the symptoms. The treatment provided no relief. There is no history of asthma, bronchiectasis, bronchitis or environmental allergies. Lowering cancer status post laryngectomy.  Has a tracheostomy tube and PEG tube.   URI   This is a new problem. The current episode started in the past 7 days. The problem has been gradually worsening. The maximum temperature recorded prior to his arrival was 101 - 101.9 F. Associated symptoms include congestion and coughing. Pertinent negatives include no chest pain, diarrhea, dysuria, ear pain, headaches, nausea, neck pain, rash, sore throat, vomiting or wheezing. The treatment provided no relief.   Fever   This is a new problem. The current episode started today. The problem occurs constantly. The problem has been waxing and waning. The maximum temperature noted was 101 to 101.9 F. The temperature was taken using an oral thermometer. Associated symptoms include  congestion and coughing. Pertinent negatives include no chest pain, diarrhea, ear pain, headaches, muscle aches, nausea, rash, sore throat, urinary pain, vomiting or wheezing.   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    Postoperative hypothyroidism 7/7/2022     Social History     Socioeconomic History    Marital status:      Spouse name: Jnael Batres    Number of children: 2   Occupational History    Occupation: [employer]     Employer: [employer]   Tobacco Use    Smoking status: Never Smoker    Smokeless tobacco: Never Used   Substance and Sexual Activity    Alcohol use: Not Currently     Comment: occasional    Drug use: No    Sexual activity: Yes     Partners: Female   Social History Narrative    2 children from his 1st wife     Past Surgical History:   Procedure Laterality Date    DIRECT LARYNGOBRONCHOSCOPY N/A 12/27/2021    Procedure: LARYNGOSCOPY, DIRECT, WITH BRONCHOSCOPY;  Surgeon: Flex Espinosa MD;  Location: [address];  Service: ENT;  Laterality: N/A;    DISSECTION OF NECK Bilateral 1/6/2022    Procedure: DISSECTION, NECK;  Surgeon: Jesse James MD;  Location: [address];  Service: ENT;  Laterality: Bilateral;    FLAP PROCEDURE Right 1/6/2022    Procedure: CREATION, FREE FLAP;  Surgeon: Alise Hart MD;  Location: [address];  Service: ENT;  Laterality: Right;  Ischemic start 1351  Ischemic stop 1502    INSERTION OF TUNNELED CENTRAL VENOUS CATHETER (CVC) WITH SUBCUTANEOUS PORT N/A 6/9/2022    Procedure: VEHGIUZRP-WHEL-C-CATH;  Surgeon: Jesus Viera MD;  Location: [facility]  OR;  Service: General;  Laterality: N/A;    LARYNGECTOMY N/A 1/6/2022    Procedure: LARYNGECTOMY;  Surgeon: Jesse James MD;  [facility];  Service: ENT;  Laterality: N/A;    LARYNGOSCOPY N/A 8/4/2020    Procedure: Suspension microlaryngoscopy with biopsy, possible KTP laser treatment/excision;  Surgeon: Stew Noel MD;  [facility];  Service: ENT;  Laterality: N/A;  Microscope, telescopes, tower, microinstruments, KTP laser, rep conf# [id number] IC 7/28.    LARYNGOSCOPY N/A 3/16/2021    Procedure: Suspension microlaryngoscopy with excision of lesion, possible CO2 laser;  Surgeon: Stew Noel MD;  [facility];  Service: ENT;  Laterality: N/A;  Microscope, telescopes, tower, microinstruments, CO2 laser, rep conf# [id number] IC 3/4.    LARYNGOSCOPY N/A 4/1/2021    Procedure: Suspension microlaryngoscopy with KTP laser excision of lesion;  Surgeon: Stew Noel MD;  [facility];  Service: ENT;  Laterality: N/A;  Microscope, telescopes, tower, microinstruments, 70 degree scope, vocal fold , KTP laser, rep conf# [id number] BC    LARYNGOSCOPY N/A 12/9/2021    Procedure: Suspension microlaryngoscopy with biopsy;  Surgeon: Stew Noel MD;  [facility];  Service: ENT;  Laterality: N/A;  Microscope, telescopes, tower, microinstruments    LARYNGOSCOPY N/A 1/6/2022    Procedure: LARYNGOSCOPY;  Surgeon: Jesse James MD;  [facility];  Service: ENT;  Laterality: N/A;    LARYNGOSCOPY N/A 4/27/2022    Procedure: LARYNGOSCOPY WITH BIOPSY;  Surgeon: Jesse James MD;  [facility];  Service: ENT;  Laterality: N/A;    MICROLARYNGOSCOPY N/A 3/17/2020    Procedure: MICROLARYNGOSCOPY;  Surgeon: Jung Xiao MD;  Location: [facility] OR;  Service: ENT;  Laterality: N/A;  Laser Microlaryngoscopy  NEED TO SCHEDULE LASER from [place] [id number]    REIMPLANTATION OF PARATHYROID  TISSUE N/A 1/6/2022    Procedure: REIMPLANTATION, PARATHYROID TISSUE;  Surgeon: Jesse James MD;  Location: [facility] OR [facility];  Service: ENT;  Laterality: N/A;    THYROIDECTOMY  1/6/2022    Procedure: THYROIDECTOMY;  Surgeon: Jesse James MD;  Location: [facility] OR [facility];  Service: ENT;;    TRACHEOSTOMY N/A 12/27/2021    Procedure: CREATION, TRACHEOSTOMY;  Surgeon: Flex Espinosa MD;  Location: [facility];  Service: ENT;  Laterality: N/A;     Family History   Problem Relation Age of Onset    Abnormal EKG Mother     Diabetes Father     Heart disease Father     Hypertension Father        Review of Systems   Constitutional: Positive for activity change, appetite change, chills, fatigue and fever. Negative for unexpected weight change (Gained 10 lb of weight from 137 lb.).   HENT: Positive for congestion and trouble swallowing. Negative for ear pain, hearing loss and sore throat.         Laryngeal cancer status post surgery.  Currently has a tracheotomy.  Secretions from tracheostomy tube somewhat more whitish and greenish.   Eyes: Negative for discharge, redness and visual disturbance.   Respiratory: Positive for cough. Negative for hemoptysis, chest tightness and wheezing.         History of laryngeal cancer.   Cardiovascular: Negative for chest pain and palpitations.   Gastrointestinal: Negative for abdominal distention, anal bleeding, blood in stool, constipation, diarrhea, nausea and vomiting.        PEG tube feeding.   Endocrine: Negative for cold intolerance, polydipsia and polyuria.        Elevated blood sugars noted.   Genitourinary: Negative for difficulty urinating, dysuria and flank pain.   Musculoskeletal: Negative for arthralgias, joint swelling and neck pain.   Skin: Negative for rash.   Allergic/Immunologic: Positive for immunocompromised state. Negative for environmental allergies and food allergies.   Neurological: Negative for dizziness, seizures, numbness and headaches.  "  Hematological: Negative for adenopathy. Does not bruise/bleed easily.   Psychiatric/Behavioral: Negative for agitation, confusion and dysphoric mood.         Objective:      Blood pressure 120/73, pulse 110, temperature (!) 101.1 °F (38.4 °C), height 5' 6" (1.676 m), weight 62.1 kg (137 lb). Body mass index is 22.11 kg/m².  Physical Exam  Constitutional:       General: He is not in acute distress.     Appearance: He is ill-appearing. He is not diaphoretic.   HENT:      Head: Normocephalic.        Comments: Tracheostomy tube noted in the neck.  Fitting well.  No infection.  Eyes:      General: No scleral icterus.  Neck:      Vascular: No carotid bruit.   Cardiovascular:      Rate and Rhythm: Tachycardia present.      Comments: Heart rate at arrival was 126.  Subsequently seem to settle down to 110.   Pulmonary:      Effort: Pulmonary effort is normal. No respiratory distress.      Breath sounds: Normal breath sounds. No wheezing or rhonchi.   Abdominal:      General: Abdomen is flat. There is no distension.      Palpations: Abdomen is soft.      Tenderness: There is no abdominal tenderness.      Comments: Peg tube noted.   Musculoskeletal:         General: No swelling or tenderness.      Cervical back: No rigidity or tenderness.      Right lower leg: No edema.      Left lower leg: No edema.   Skin:     Coloration: Skin is jaundiced. Skin is not pale.      Findings: No erythema or lesion.   Neurological:      Mental Status: He is alert. Mental status is at baseline.   Psychiatric:         Behavior: Behavior normal.           Assessment:       1. Chest congestion    2. Fever, unspecified fever cause    3. Acute bronchitis, unspecified organism    4. Leukopenia, unspecified type    5. Anemia associated with malignant neoplastic disease    6. Larynx cancer           Narrative & Impression  2 view chest     CLINICAL DATA: Congestion, fever     FINDINGS: PA and lateral views are compared to June 9. Heart size is normal. " The mediastinum is unremarkable. Left-sided Port-A-Cath tip is at the superior vena cava.     No infiltrates are identified. Retrocardiac opacity on the left is unchanged compared to multiple prior studies dating back to 2016, and likely related to pulmonary parenchymal scarring. There are no effusions. Osseous structures are unremarkable, with mild dextroscoliosis incidentally noted.     IMPRESSION:  1. Chronic findings as described. No acute radiographic abnormalities are identified.     Electronically signed by:  Naif Novoa MD  7/26/2022 4:36 PM CDT Workstation: [id number]             Specimen Collected: 07/26/22 15:49 Last Resulted: 07/26/22 16:36              Component Ref Range & Units 15:42   (7/26/22) 11 d ago   (7/15/22) 2 wk ago   (7/8/22) 1 mo ago   (6/9/22) 1 mo ago   (6/8/22) 2 mo ago   (5/23/22) 2 mo ago   (5/22/22)   WBC 3.90 - 12.70 K/uL 2.02 Low   12.19  7.29  5.73  5.71  5.91  6.35    RBC 4.60 - 6.20 M/uL 4.09 Low   4.12 Low   4.06 Low   3.34 Low   3.40 Low   3.23 Low   3.38 Low     Hemoglobin 14.0 - 18.0 g/dL 10.1 Low   9.9 Low   9.7 Low   8.3 Low   8.4 Low   8.2 Low   8.7 Low     Hematocrit 40.0 - 54.0 % 32.2 Low   32.6 Low   32.8 Low   27.1 Low   27.3 Low   25.4 Low   27.1 Low     MCV 82 - 98 fL 79 Low   79 Low   81 Low   81 Low   80 Low   79 Low   80 Low     MCH 27.0 - 31.0 pg 24.7 Low   24.0 Low   23.9 Low   24.9 Low   24.7 Low   25.4 Low   25.7 Low     MCHC 32.0 - 36.0 g/dL 31.4 Low   30.4 Low   29.6 Low   30.6 Low   30.8 Low   32.3  32.1    RDW 11.5 - 14.5 % 19.2 High   16.8 High   16.1 High   14.5  14.8 High   14.8 High   14.6 High     Platelets 150 - 450 K/uL 189  289  291  256  264  273  261    MPV 9.2 - 12.9 fL 10.7  11.7  12.0  10.3  10.8  10.9  11.3    Immature Granulocytes 0.0 - 0.5 % CANCELED  0.5  0.7 High   0.3  0.4  0.5  0.5    Comment: Result canceled by the ancillary.   Immature Grans (Abs) 0.00 - 0.04 K/uL CANCELED  0.06 High  CM  0.05 High  CM  0.02 CM  0.02 CM  0.03  CM  0.03 CM            Plan:           Chest congestion  -     POCT COVID-19 Rapid Screening  -     Discontinue: amoxicillin-clavulanate (AUGMENTIN) 200-28.5 mg/5 mL SusR; Take 10 mLs (400 mg total) by mouth every 12 (twelve) hours. Peg Tube  Dispense: 100 mL; Refill: 0  -     Discontinue: prednisone 5 mg/5 mL Soln; Take 20 mLs (20 mg total) by mouth once daily. Via peg  tube  Dispense: 100 mL; Refill: 0  -     Discontinue: promethazine-dextromethorphan (PROMETHAZINE-DM) 6.25-15 mg/5 mL Syrp; Take 5 mLs by mouth every 8 (eight) hours as needed (As needed for cough Via Peg  tube). Do not drive on this because of drowsiness  Dispense: 120 mL; Refill: 1  -     X-Ray Chest PA And Lateral; Future; Expected date: 07/26/2022  -     CBC Auto Differential; Future; Expected date: 07/26/2022  -     prednisone 5 mg/5 mL Soln; Take 20 mLs (20 mg total) by mouth once daily. Via peg  tube  Dispense: 100 mL; Refill: 0  -     amoxicillin-clavulanate (AUGMENTIN) 200-28.5 mg/5 mL SusR; Take 10 mLs (400 mg total) by mouth every 12 (twelve) hours. Peg Tube  Dispense: 100 mL; Refill: 0  -     promethazine-dextromethorphan (PROMETHAZINE-DM) 6.25-15 mg/5 mL Syrp; Take 5 mLs by mouth every 8 (eight) hours as needed (As needed for cough Via Peg  tube). Do not drive on this because of drowsiness  Dispense: 120 mL; Refill: 1  -     cefdinir (OMNICEF) 250 mg/5 mL suspension; Take 5 mLs (250 mg total) by mouth every 12 (twelve) hours.  Dispense: 70 mL; Refill: 0    Fever, unspecified fever cause  -     POCT COVID-19 Rapid Screening  -     Discontinue: amoxicillin-clavulanate (AUGMENTIN) 200-28.5 mg/5 mL SusR; Take 10 mLs (400 mg total) by mouth every 12 (twelve) hours. Peg Tube  Dispense: 100 mL; Refill: 0  -     Discontinue: prednisone 5 mg/5 mL Soln; Take 20 mLs (20 mg total) by mouth once daily. Via peg  tube  Dispense: 100 mL; Refill: 0  -     Discontinue: promethazine-dextromethorphan (PROMETHAZINE-DM) 6.25-15 mg/5 mL Syrp; Take 5 mLs by  mouth every 8 (eight) hours as needed (As needed for cough Via Peg  tube). Do not drive on this because of drowsiness  Dispense: 120 mL; Refill: 1  -     X-Ray Chest PA And Lateral; Future; Expected date: 07/26/2022  -     CBC Auto Differential; Future; Expected date: 07/26/2022  -     prednisone 5 mg/5 mL Soln; Take 20 mLs (20 mg total) by mouth once daily. Via peg  tube  Dispense: 100 mL; Refill: 0  -     amoxicillin-clavulanate (AUGMENTIN) 200-28.5 mg/5 mL SusR; Take 10 mLs (400 mg total) by mouth every 12 (twelve) hours. Peg Tube  Dispense: 100 mL; Refill: 0  -     promethazine-dextromethorphan (PROMETHAZINE-DM) 6.25-15 mg/5 mL Syrp; Take 5 mLs by mouth every 8 (eight) hours as needed (As needed for cough Via Peg  tube). Do not drive on this because of drowsiness  Dispense: 120 mL; Refill: 1  -     cefdinir (OMNICEF) 250 mg/5 mL suspension; Take 5 mLs (250 mg total) by mouth every 12 (twelve) hours.  Dispense: 70 mL; Refill: 0    Acute bronchitis, unspecified organism  -     Discontinue: amoxicillin-clavulanate (AUGMENTIN) 200-28.5 mg/5 mL SusR; Take 10 mLs (400 mg total) by mouth every 12 (twelve) hours. Peg Tube  Dispense: 100 mL; Refill: 0  -     Discontinue: prednisone 5 mg/5 mL Soln; Take 20 mLs (20 mg total) by mouth once daily. Via peg  tube  Dispense: 100 mL; Refill: 0  -     Discontinue: promethazine-dextromethorphan (PROMETHAZINE-DM) 6.25-15 mg/5 mL Syrp; Take 5 mLs by mouth every 8 (eight) hours as needed (As needed for cough Via Peg  tube). Do not drive on this because of drowsiness  Dispense: 120 mL; Refill: 1  -     X-Ray Chest PA And Lateral; Future; Expected date: 07/26/2022  -     prednisone 5 mg/5 mL Soln; Take 20 mLs (20 mg total) by mouth once daily. Via peg  tube  Dispense: 100 mL; Refill: 0  -     amoxicillin-clavulanate (AUGMENTIN) 200-28.5 mg/5 mL SusR; Take 10 mLs (400 mg total) by mouth every 12 (twelve) hours. Peg Tube  Dispense: 100 mL; Refill: 0  -     promethazine-dextromethorphan  (PROMETHAZINE-DM) 6.25-15 mg/5 mL Syrp; Take 5 mLs by mouth every 8 (eight) hours as needed (As needed for cough Via Peg  tube). Do not drive on this because of drowsiness  Dispense: 120 mL; Refill: 1  -     cefdinir (OMNICEF) 250 mg/5 mL suspension; Take 5 mLs (250 mg total) by mouth every 12 (twelve) hours.  Dispense: 70 mL; Refill: 0    Leukopenia, unspecified type    Anemia associated with malignant neoplastic disease    Larynx cancer    Patient's presentation is off upper respiratory symptoms, congestion, feverishness.  No direct contact with COVID.    He does have a tracheostomy status and given his cancer is immune-compromised.    Pulmonary auscultation is not very remarkable.  He does not look significantly toxic.  He does point out to a whitish greenish mucus coming out of the tracheostomy Q. I could not see any during the clinic.    Most of the communication is through his writing tablet and is instantaneous and nuaunced communication is rather difficult.    My assessment at this point would be that he has acute bronchitis.  His white cell count is significantly on the low side indicating leukopenia.  H&H and platelets seem to be stable.    Batting any other further diagnosis, I am going to treat him with Augmentin liquid, prednisone liquid and promethazine DM liquid.  His home care is somewhat uncertain to me given his lack of verbal communication.    I did notify patient's wife for the results.  Chest x-ray looks okay.  White cell counts are low.  It seems pharmacy had trouble finding Augmentin and I have sent a prescription for cefdinir if available.    Please go to the emergency room if no better.    Spent brittany 30 minutes with patient which involved review of pts medical conditions, labs, medications and with 50% of time face-to-face discussion about medical problems, management and any applicable changes.            Current Outpatient Medications:     acetaminophen (TYLENOL) 325 MG tablet, Take 325 mg  by mouth every 6 (six) hours as needed for Pain., Disp: , Rfl:     dexAMETHasone (DECADRON) 4 MG Tab, Take 2 tablets (8 mg total) by mouth once daily. Take as directed on days 2, 3, and 4 of your chemotherapy cycle., Disp: 24 tablet, Rfl: 2    diphenhydrAMINE (BENADRYL) 25 mg capsule, Take 25 mg by mouth every 6 (six) hours as needed for Itching., Disp: , Rfl:     esomeprazole (NEXIUM) 40 MG capsule, Take 40 mg by mouth before breakfast., Disp: , Rfl:     levothyroxine (SYNTHROID) 100 MCG tablet, Take 1 tablet (100 mcg total) by mouth before breakfast., Disp: 30 tablet, Rfl: 11    losartan (COZAAR) 100 MG tablet, TAKE 1 TABLET BY MOUTH EVERY DAY (Patient taking differently: Take 100 mg by mouth every evening.), Disp: 90 tablet, Rfl: 3    metFORMIN (GLUCOPHAGE) 500 MG tablet, Take 1 tablet (500 mg total) by mouth 2 (two) times daily with meals., Disp: 60 tablet, Rfl: 3    nut.tx.gluc intol,lf,soy-fiber (GLUCERNA 1.5 TRISHA) 0.08-1.5 gram-kcal/mL Liqd, 5 Cans by PEG Tube route 5 (five) times daily., Disp: 150 each, Rfl: 5    ondansetron (ZOFRAN-ODT) 8 MG TbDL, Take 1 tablet (8 mg total) by mouth every 8 (eight) hours as needed (nausea/vomiting)., Disp: 60 tablet, Rfl: 5    traZODone (DESYREL) 50 MG tablet, Take 1 tablet (50 mg total) by mouth nightly as needed for Insomnia., Disp: 30 tablet, Rfl: 2    amoxicillin-clavulanate (AUGMENTIN) 200-28.5 mg/5 mL SusR, Take 10 mLs (400 mg total) by mouth every 12 (twelve) hours. Peg Tube, Disp: 100 mL, Rfl: 0    cefdinir (OMNICEF) 250 mg/5 mL suspension, Take 5 mLs (250 mg total) by mouth every 12 (twelve) hours., Disp: 70 mL, Rfl: 0    OLANZapine (ZYPREXA) 5 MG tablet, Take 1 tablet (5 mg total) by mouth every evening. Take as directed on days 1-4 of your chemotherapy cycle. (Patient not taking: Reported on 7/26/2022), Disp: 30 tablet, Rfl: 5    prednisone 5 mg/5 mL Soln, Take 20 mLs (20 mg total) by mouth once daily. Via peg  tube, Disp: 100 mL, Rfl: 0     promethazine-dextromethorphan (PROMETHAZINE-DM) 6.25-15 mg/5 mL Syrp, Take 5 mLs by mouth every 8 (eight) hours as needed (As needed for cough Via Peg  tube). Do not drive on this because of drowsiness, Disp: 120 mL, Rfl: 1  No current facility-administered medications for this visit.    Facility-Administered Medications Ordered in Other Visits:     lactated ringers infusion, , Intravenous, Continuous, oTrsten Hilton MD

## 2022-07-27 ENCOUNTER — INFUSION (OUTPATIENT)
Dept: INFUSION THERAPY | Facility: HOSPITAL | Age: 72
End: 2022-07-27
Attending: STUDENT IN AN ORGANIZED HEALTH CARE EDUCATION/TRAINING PROGRAM
Payer: MEDICARE

## 2022-07-27 ENCOUNTER — PATIENT MESSAGE (OUTPATIENT)
Dept: FAMILY MEDICINE | Facility: CLINIC | Age: 72
End: 2022-07-27

## 2022-07-27 VITALS
DIASTOLIC BLOOD PRESSURE: 59 MMHG | SYSTOLIC BLOOD PRESSURE: 98 MMHG | TEMPERATURE: 98 F | WEIGHT: 140.69 LBS | OXYGEN SATURATION: 99 % | HEART RATE: 75 BPM | RESPIRATION RATE: 18 BRPM | BODY MASS INDEX: 22.61 KG/M2 | HEIGHT: 66 IN

## 2022-07-27 DIAGNOSIS — D50.0 IRON DEFICIENCY ANEMIA DUE TO CHRONIC BLOOD LOSS: Primary | ICD-10-CM

## 2022-07-27 PROCEDURE — 96365 THER/PROPH/DIAG IV INF INIT: CPT

## 2022-07-27 PROCEDURE — 25000003 PHARM REV CODE 250: Performed by: STUDENT IN AN ORGANIZED HEALTH CARE EDUCATION/TRAINING PROGRAM

## 2022-07-27 PROCEDURE — 63600175 PHARM REV CODE 636 W HCPCS: Performed by: STUDENT IN AN ORGANIZED HEALTH CARE EDUCATION/TRAINING PROGRAM

## 2022-07-27 PROCEDURE — A4216 STERILE WATER/SALINE, 10 ML: HCPCS | Performed by: STUDENT IN AN ORGANIZED HEALTH CARE EDUCATION/TRAINING PROGRAM

## 2022-07-27 RX ORDER — SODIUM CHLORIDE 0.9 % (FLUSH) 0.9 %
10 SYRINGE (ML) INJECTION
Status: DISCONTINUED | OUTPATIENT
Start: 2022-07-27 | End: 2022-07-27 | Stop reason: HOSPADM

## 2022-07-27 RX ORDER — HEPARIN 100 UNIT/ML
500 SYRINGE INTRAVENOUS
Status: DISCONTINUED | OUTPATIENT
Start: 2022-07-27 | End: 2022-07-27 | Stop reason: HOSPADM

## 2022-07-27 RX ADMIN — SODIUM CHLORIDE, PRESERVATIVE FREE 10 ML: 5 INJECTION INTRAVENOUS at 03:07

## 2022-07-27 RX ADMIN — HEPARIN 500 UNITS: 100 SYRINGE at 03:07

## 2022-07-27 RX ADMIN — SODIUM CHLORIDE: 9 INJECTION, SOLUTION INTRAVENOUS at 01:07

## 2022-07-27 RX ADMIN — IRON SUCROSE: 20 INJECTION, SOLUTION INTRAVENOUS at 01:07

## 2022-07-27 NOTE — PLAN OF CARE
Problem: Fatigue  Goal: Improved Activity Tolerance  Outcome: Ongoing, Progressing  Intervention: Promote Improved Energy  Flowsheets (Taken 7/27/2022 1337)  Fatigue Management: frequent rest breaks encouraged  Sleep/Rest Enhancement:   regular sleep/rest pattern promoted   relaxation techniques promoted  Activity Management: Ambulated -L4

## 2022-07-29 ENCOUNTER — LAB VISIT (OUTPATIENT)
Dept: LAB | Facility: HOSPITAL | Age: 72
End: 2022-07-29
Attending: INTERNAL MEDICINE
Payer: MEDICARE

## 2022-07-29 DIAGNOSIS — C32.0 MALIGNANT NEOPLASM OF TRUE VOCAL CORD: ICD-10-CM

## 2022-07-29 LAB
ALBUMIN SERPL BCP-MCNC: 3.6 G/DL (ref 3.5–5.2)
ALP SERPL-CCNC: 76 U/L (ref 55–135)
ALT SERPL W/O P-5'-P-CCNC: 15 U/L (ref 10–44)
ANION GAP SERPL CALC-SCNC: 8 MMOL/L (ref 8–16)
AST SERPL-CCNC: 14 U/L (ref 10–40)
BASOPHILS NFR BLD: 0 % (ref 0–1.9)
BILIRUB SERPL-MCNC: 0.8 MG/DL (ref 0.1–1)
BUN SERPL-MCNC: 28 MG/DL (ref 8–23)
CALCIUM SERPL-MCNC: 8.9 MG/DL (ref 8.7–10.5)
CHLORIDE SERPL-SCNC: 99 MMOL/L (ref 95–110)
CO2 SERPL-SCNC: 26 MMOL/L (ref 23–29)
CREAT SERPL-MCNC: 1 MG/DL (ref 0.5–1.4)
DACRYOCYTES BLD QL SMEAR: ABNORMAL
DIFFERENTIAL METHOD: ABNORMAL
EOSINOPHIL NFR BLD: 0 % (ref 0–8)
ERYTHROCYTE [DISTWIDTH] IN BLOOD BY AUTOMATED COUNT: 19.5 % (ref 11.5–14.5)
EST. GFR  (AFRICAN AMERICAN): >60 ML/MIN/1.73 M^2
EST. GFR  (NON AFRICAN AMERICAN): >60 ML/MIN/1.73 M^2
GLUCOSE SERPL-MCNC: 176 MG/DL (ref 70–110)
HCT VFR BLD AUTO: 31.3 % (ref 40–54)
HGB BLD-MCNC: 9.7 G/DL (ref 14–18)
IMM GRANULOCYTES # BLD AUTO: ABNORMAL K/UL (ref 0–0.04)
IMM GRANULOCYTES NFR BLD AUTO: ABNORMAL % (ref 0–0.5)
LYMPHOCYTES NFR BLD: 16 % (ref 18–48)
MCH RBC QN AUTO: 24.4 PG (ref 27–31)
MCHC RBC AUTO-ENTMCNC: 31 G/DL (ref 32–36)
MCV RBC AUTO: 79 FL (ref 82–98)
METAMYELOCYTES NFR BLD MANUAL: 5 %
MONOCYTES NFR BLD: 6 % (ref 4–15)
MYELOCYTES NFR BLD MANUAL: 1 %
NEUTROPHILS NFR BLD: 56 % (ref 38–73)
NEUTS BAND NFR BLD MANUAL: 15 %
NRBC BLD-RTO: 0 /100 WBC
OVALOCYTES BLD QL SMEAR: ABNORMAL
PLATELET # BLD AUTO: 200 K/UL (ref 150–450)
PLATELET BLD QL SMEAR: ABNORMAL
PMV BLD AUTO: 10.1 FL (ref 9.2–12.9)
POIKILOCYTOSIS BLD QL SMEAR: SLIGHT
POTASSIUM SERPL-SCNC: 4.4 MMOL/L (ref 3.5–5.1)
PROMYELOCYTES NFR BLD MANUAL: 1 %
PROT SERPL-MCNC: 6.7 G/DL (ref 6–8.4)
RBC # BLD AUTO: 3.97 M/UL (ref 4.6–6.2)
SODIUM SERPL-SCNC: 133 MMOL/L (ref 136–145)
WBC # BLD AUTO: 4.66 K/UL (ref 3.9–12.7)

## 2022-07-29 PROCEDURE — 85007 BL SMEAR W/DIFF WBC COUNT: CPT | Performed by: INTERNAL MEDICINE

## 2022-07-29 PROCEDURE — 80053 COMPREHEN METABOLIC PANEL: CPT | Performed by: INTERNAL MEDICINE

## 2022-07-29 PROCEDURE — 85027 COMPLETE CBC AUTOMATED: CPT | Performed by: INTERNAL MEDICINE

## 2022-07-29 PROCEDURE — 36415 COLL VENOUS BLD VENIPUNCTURE: CPT | Performed by: INTERNAL MEDICINE

## 2022-08-01 ENCOUNTER — INFUSION (OUTPATIENT)
Dept: INFUSION THERAPY | Facility: HOSPITAL | Age: 72
End: 2022-08-01
Attending: INTERNAL MEDICINE
Payer: MEDICARE

## 2022-08-01 ENCOUNTER — OFFICE VISIT (OUTPATIENT)
Dept: HEMATOLOGY/ONCOLOGY | Facility: CLINIC | Age: 72
End: 2022-08-01
Payer: MEDICARE

## 2022-08-01 VITALS
WEIGHT: 138.25 LBS | HEIGHT: 66 IN | OXYGEN SATURATION: 99 % | BODY MASS INDEX: 22.22 KG/M2 | RESPIRATION RATE: 14 BRPM | HEART RATE: 98 BPM | DIASTOLIC BLOOD PRESSURE: 62 MMHG | TEMPERATURE: 97 F | SYSTOLIC BLOOD PRESSURE: 110 MMHG

## 2022-08-01 VITALS
BODY MASS INDEX: 22.22 KG/M2 | DIASTOLIC BLOOD PRESSURE: 63 MMHG | TEMPERATURE: 98 F | HEIGHT: 66 IN | HEART RATE: 82 BPM | SYSTOLIC BLOOD PRESSURE: 100 MMHG | WEIGHT: 138.25 LBS | RESPIRATION RATE: 18 BRPM | OXYGEN SATURATION: 96 %

## 2022-08-01 DIAGNOSIS — R11.2 CHEMOTHERAPY-INDUCED NAUSEA AND VOMITING: ICD-10-CM

## 2022-08-01 DIAGNOSIS — C01 PRIMARY CANCER OF BASE OF TONGUE: Primary | ICD-10-CM

## 2022-08-01 DIAGNOSIS — D50.0 IRON DEFICIENCY ANEMIA DUE TO CHRONIC BLOOD LOSS: ICD-10-CM

## 2022-08-01 DIAGNOSIS — C32.9 LARYNX CANCER: ICD-10-CM

## 2022-08-01 DIAGNOSIS — C01 PRIMARY CANCER OF BASE OF TONGUE: ICD-10-CM

## 2022-08-01 DIAGNOSIS — T45.1X5A CHEMOTHERAPY-INDUCED NAUSEA AND VOMITING: ICD-10-CM

## 2022-08-01 PROCEDURE — 3060F POS MICROALBUMINURIA REV: CPT | Mod: CPTII,S$GLB,, | Performed by: INTERNAL MEDICINE

## 2022-08-01 PROCEDURE — 1101F PR PT FALLS ASSESS DOC 0-1 FALLS W/OUT INJ PAST YR: ICD-10-PCS | Mod: CPTII,S$GLB,, | Performed by: INTERNAL MEDICINE

## 2022-08-01 PROCEDURE — 3060F PR POS MICROALBUMINURIA RESULT DOCUMENTED/REVIEW: ICD-10-PCS | Mod: CPTII,S$GLB,, | Performed by: INTERNAL MEDICINE

## 2022-08-01 PROCEDURE — 96367 TX/PROPH/DG ADDL SEQ IV INF: CPT

## 2022-08-01 PROCEDURE — 99215 OFFICE O/P EST HI 40 MIN: CPT | Mod: S$GLB,,, | Performed by: INTERNAL MEDICINE

## 2022-08-01 PROCEDURE — 3044F HG A1C LEVEL LT 7.0%: CPT | Mod: CPTII,S$GLB,, | Performed by: INTERNAL MEDICINE

## 2022-08-01 PROCEDURE — 3288F PR FALLS RISK ASSESSMENT DOCUMENTED: ICD-10-PCS | Mod: CPTII,S$GLB,, | Performed by: INTERNAL MEDICINE

## 2022-08-01 PROCEDURE — 3078F DIAST BP <80 MM HG: CPT | Mod: CPTII,S$GLB,, | Performed by: INTERNAL MEDICINE

## 2022-08-01 PROCEDURE — 1101F PT FALLS ASSESS-DOCD LE1/YR: CPT | Mod: CPTII,S$GLB,, | Performed by: INTERNAL MEDICINE

## 2022-08-01 PROCEDURE — 3008F BODY MASS INDEX DOCD: CPT | Mod: CPTII,S$GLB,, | Performed by: INTERNAL MEDICINE

## 2022-08-01 PROCEDURE — 1126F AMNT PAIN NOTED NONE PRSNT: CPT | Mod: CPTII,S$GLB,, | Performed by: INTERNAL MEDICINE

## 2022-08-01 PROCEDURE — 4010F ACE/ARB THERAPY RXD/TAKEN: CPT | Mod: CPTII,S$GLB,, | Performed by: INTERNAL MEDICINE

## 2022-08-01 PROCEDURE — 3288F FALL RISK ASSESSMENT DOCD: CPT | Mod: CPTII,S$GLB,, | Performed by: INTERNAL MEDICINE

## 2022-08-01 PROCEDURE — 63600175 PHARM REV CODE 636 W HCPCS: Mod: JG | Performed by: INTERNAL MEDICINE

## 2022-08-01 PROCEDURE — 3066F PR DOCUMENTATION OF TREATMENT FOR NEPHROPATHY: ICD-10-PCS | Mod: CPTII,S$GLB,, | Performed by: INTERNAL MEDICINE

## 2022-08-01 PROCEDURE — 3074F PR MOST RECENT SYSTOLIC BLOOD PRESSURE < 130 MM HG: ICD-10-PCS | Mod: CPTII,S$GLB,, | Performed by: INTERNAL MEDICINE

## 2022-08-01 PROCEDURE — 25000003 PHARM REV CODE 250: Performed by: INTERNAL MEDICINE

## 2022-08-01 PROCEDURE — 99999 PR PBB SHADOW E&M-EST. PATIENT-LVL IV: CPT | Mod: PBBFAC,,, | Performed by: INTERNAL MEDICINE

## 2022-08-01 PROCEDURE — 1126F PR PAIN SEVERITY QUANTIFIED, NO PAIN PRESENT: ICD-10-PCS | Mod: CPTII,S$GLB,, | Performed by: INTERNAL MEDICINE

## 2022-08-01 PROCEDURE — 4010F PR ACE/ARB THEARPY RXD/TAKEN: ICD-10-PCS | Mod: CPTII,S$GLB,, | Performed by: INTERNAL MEDICINE

## 2022-08-01 PROCEDURE — 96413 CHEMO IV INFUSION 1 HR: CPT

## 2022-08-01 PROCEDURE — 99999 PR PBB SHADOW E&M-EST. PATIENT-LVL IV: ICD-10-PCS | Mod: PBBFAC,,, | Performed by: INTERNAL MEDICINE

## 2022-08-01 PROCEDURE — 96416 CHEMO PROLONG INFUSE W/PUMP: CPT

## 2022-08-01 PROCEDURE — 3044F PR MOST RECENT HEMOGLOBIN A1C LEVEL <7.0%: ICD-10-PCS | Mod: CPTII,S$GLB,, | Performed by: INTERNAL MEDICINE

## 2022-08-01 PROCEDURE — 3066F NEPHROPATHY DOC TX: CPT | Mod: CPTII,S$GLB,, | Performed by: INTERNAL MEDICINE

## 2022-08-01 PROCEDURE — 99215 PR OFFICE/OUTPT VISIT, EST, LEVL V, 40-54 MIN: ICD-10-PCS | Mod: S$GLB,,, | Performed by: INTERNAL MEDICINE

## 2022-08-01 PROCEDURE — 3078F PR MOST RECENT DIASTOLIC BLOOD PRESSURE < 80 MM HG: ICD-10-PCS | Mod: CPTII,S$GLB,, | Performed by: INTERNAL MEDICINE

## 2022-08-01 PROCEDURE — 96417 CHEMO IV INFUS EACH ADDL SEQ: CPT

## 2022-08-01 PROCEDURE — 96375 TX/PRO/DX INJ NEW DRUG ADDON: CPT

## 2022-08-01 PROCEDURE — 3074F SYST BP LT 130 MM HG: CPT | Mod: CPTII,S$GLB,, | Performed by: INTERNAL MEDICINE

## 2022-08-01 PROCEDURE — 3008F PR BODY MASS INDEX (BMI) DOCUMENTED: ICD-10-PCS | Mod: CPTII,S$GLB,, | Performed by: INTERNAL MEDICINE

## 2022-08-01 RX ORDER — HEPARIN 100 UNIT/ML
500 SYRINGE INTRAVENOUS
Status: CANCELLED | OUTPATIENT
Start: 2022-08-05

## 2022-08-01 RX ORDER — EPINEPHRINE 0.3 MG/.3ML
0.3 INJECTION SUBCUTANEOUS ONCE AS NEEDED
Status: CANCELLED | OUTPATIENT
Start: 2022-08-01

## 2022-08-01 RX ORDER — DIPHENHYDRAMINE HYDROCHLORIDE 50 MG/ML
50 INJECTION INTRAMUSCULAR; INTRAVENOUS ONCE AS NEEDED
Status: CANCELLED | OUTPATIENT
Start: 2022-08-01

## 2022-08-01 RX ORDER — SODIUM CHLORIDE 0.9 % (FLUSH) 0.9 %
10 SYRINGE (ML) INJECTION
Status: DISCONTINUED | OUTPATIENT
Start: 2022-08-01 | End: 2022-08-01 | Stop reason: HOSPADM

## 2022-08-01 RX ORDER — EPINEPHRINE 0.3 MG/.3ML
0.3 INJECTION SUBCUTANEOUS ONCE AS NEEDED
Status: DISCONTINUED | OUTPATIENT
Start: 2022-08-01 | End: 2022-08-01 | Stop reason: HOSPADM

## 2022-08-01 RX ORDER — HEPARIN 100 UNIT/ML
500 SYRINGE INTRAVENOUS
Status: CANCELLED | OUTPATIENT
Start: 2022-08-01

## 2022-08-01 RX ORDER — SODIUM CHLORIDE 0.9 % (FLUSH) 0.9 %
10 SYRINGE (ML) INJECTION
Status: CANCELLED | OUTPATIENT
Start: 2022-08-01

## 2022-08-01 RX ORDER — SODIUM CHLORIDE 0.9 % (FLUSH) 0.9 %
10 SYRINGE (ML) INJECTION
Status: CANCELLED | OUTPATIENT
Start: 2022-08-05

## 2022-08-01 RX ADMIN — SODIUM CHLORIDE 200 MG: 9 INJECTION, SOLUTION INTRAVENOUS at 11:08

## 2022-08-01 RX ADMIN — FLUOROURACIL 6920 MG: 50 INJECTION, SOLUTION INTRAVENOUS at 01:08

## 2022-08-01 RX ADMIN — CARBOPLATIN 435 MG: 10 INJECTION, SOLUTION INTRAVENOUS at 12:08

## 2022-08-01 RX ADMIN — DEXAMETHASONE SODIUM PHOSPHATE 0.25 MG: 4 INJECTION, SOLUTION INTRA-ARTICULAR; INTRALESIONAL; INTRAMUSCULAR; INTRAVENOUS; SOFT TISSUE at 12:08

## 2022-08-01 RX ADMIN — APREPITANT 130 MG: 130 INJECTION, EMULSION INTRAVENOUS at 12:08

## 2022-08-01 NOTE — PROGRESS NOTES
Service Date:  8/1/22    Chief Complaint: tongue cancer    Cyrus Batres Jr. is a 72 y.o. male referred here by Dr. Noel for laryngeal cancer.      Oncological history is as followed:  10/30/2020: completion of IMRT to larynx and bilateral necks via SiB totaling 6996 cGy.  Diagnosed as a T2 N0 M0 lesion at that time, stage II.   3/16/2021: SML left VF resection encompassing AC; margins negative on frozen, some margins positive on permanent  4/1/2021: SML KTP left transmuscular cordectomy, anterior right sublig resection encompassing AC; left TVF clear with negative margin;  right TVF frozen margins negative but positive on permanent  5/6/2021: SML KTP transmusc resection anterior right TVF; negative for carcinoma  1/6/2022: s/p Total laryngectomy; T4aN0 +PNI; no adjuvant radiation therapy or systemic therapy given    Now with malignancy at the base of the tongue [id number]. Negative mets on PET scan. Patient is not a candidate for further radiation therapy and does not want surgery as he would need a glossectomy.    Here for chemotherapy.  Had nausea and vomiting with this last chemo.      Review of Systems   Constitutional: Negative.    HENT: Negative.    Eyes: Negative.    Respiratory: Negative.    Cardiovascular: Negative.    Gastrointestinal: Positive for nausea and vomiting.   Endocrine: Negative.    Genitourinary: Negative.    Musculoskeletal: Negative.    Integumentary:  Negative.   Neurological: Negative.    Hematological: Negative.    Psychiatric/Behavioral: Negative.         Current Outpatient Medications   Medication Instructions    acetaminophen (TYLENOL) 325 mg, Oral, Every 6 hours PRN    amoxicillin-clavulanate (AUGMENTIN) 200-28.5 mg/5 mL SusR 400 mg, Oral, Every 12 hours, Peg Tube    cefdinir (OMNICEF) 250 mg, Oral, Every 12 hours    dexAMETHasone (DECADRON) 8 mg, Oral, Daily, Take as directed on days 2, 3, and 4 of your chemotherapy cycle.    diphenhydrAMINE (BENADRYL) 25 mg, Oral, Every 6  hours PRN    esomeprazole (NEXIUM) 40 mg, Oral, Before breakfast    levothyroxine (SYNTHROID) 100 mcg, Oral, Before breakfast    losartan (COZAAR) 100 MG tablet TAKE 1 TABLET BY MOUTH EVERY DAY    metFORMIN (GLUCOPHAGE) 500 mg, Oral, 2 times daily with meals    nut.tx.gluc intol,lf,soy-fiber (GLUCERNA 1.5 TRISHA) 0.08-1.5 gram-kcal/mL Liqd 5 Cans, PEG Tube, 5 times daily    OLANZapine (ZYPREXA) 5 mg, Oral, Nightly, Take as directed on days 1-4 of your chemotherapy cycle.    ondansetron (ZOFRAN-ODT) 8 mg, Oral, Every 8 hours PRN    prednisone 20 mg, Oral, Daily, Via peg  tube    promethazine-dextromethorphan (PROMETHAZINE-DM) 6.25-15 mg/5 mL Syrp 5 mLs, Oral, Every 8 hours PRN, Do not drive on this because of drowsiness    traZODone (DESYREL) 50 mg, Oral, Nightly PRN        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    Postoperative hypothyroidism 7/7/2022        Past Surgical History:   Procedure Laterality Date    DIRECT LARYNGOBRONCHOSCOPY N/A 12/27/2021    Procedure: LARYNGOSCOPY, DIRECT, WITH BRONCHOSCOPY;  Surgeon: Flex Espinosa MD;  Location: [address];  Service: ENT;  Laterality: N/A;    DISSECTION OF NECK Bilateral 1/6/2022    Procedure: DISSECTION, NECK;  Surgeon: Jesse James MD;  Location: [address];  Service: ENT;  Laterality: Bilateral;    FLAP PROCEDURE Right 1/6/2022    Procedure: CREATION, FREE FLAP;  Surgeon: Alise Hart MD;  Location: [address];  Service: ENT;  Laterality: Right;  Ischemic start 1351  Ischemic stop 1502    INSERTION OF TUNNELED CENTRAL VENOUS CATHETER (CVC) WITH SUBCUTANEOUS PORT N/A 6/9/2022    Procedure: PHUCKLYAC-ICCN-L-CATH;  Surgeon: Jesus Viera MD;  Location: [facility];  Service: General;  Laterality: N/A;    LARYNGECTOMY N/A 1/6/2022    Procedure: LARYNGECTOMY;   Surgeon: Jesse James MD;  [facility];  Service: ENT;  Laterality: N/A;    LARYNGOSCOPY N/A 8/4/2020    Procedure: Suspension microlaryngoscopy with biopsy, possible KTP laser treatment/excision;  Surgeon: Stew Noel MD;  Location: [facility] OR [facility];  Service: ENT;  Laterality: N/A;  Microscope, telescopes, tower, microinstruments, KTP laser, rep conf# [id number] IC 7/28.    LARYNGOSCOPY N/A 3/16/2021    Procedure: Suspension microlaryngoscopy with excision of lesion, possible CO2 laser;  Surgeon: Stew Noel MD;  [facility];  Service: ENT;  Laterality: N/A;  Microscope, telescopes, tower, microinstruments, CO2 laser, rep conf# [id number] IC 3/4.    LARYNGOSCOPY N/A 4/1/2021    Procedure: Suspension microlaryngoscopy with KTP laser excision of lesion;  Surgeon: Stew Noel MD;  [facility];  Service: ENT;  Laterality: N/A;  Microscope, telescopes, tower, microinstruments, 70 degree scope, vocal fold , KTP laser, rep conf# [id number] BC    LARYNGOSCOPY N/A 12/9/2021    Procedure: Suspension microlaryngoscopy with biopsy;  Surgeon: Stew Noel MD;  [facility];  Service: ENT;  Laterality: N/A;  Microscope, telescopes, tower, microinstruments    LARYNGOSCOPY N/A 1/6/2022    Procedure: LARYNGOSCOPY;  Surgeon: Jesse James MD;  [facility];  Service: ENT;  Laterality: N/A;    LARYNGOSCOPY N/A 4/27/2022    Procedure: LARYNGOSCOPY WITH BIOPSY;  Surgeon: Jesse James MD;  [facility];  Service: ENT;  Laterality: N/A;    MICROLARYNGOSCOPY N/A 3/17/2020    Procedure: MICROLARYNGOSCOPY;  Surgeon: Jung Xiao MD;  Location: [facility] OR;  Service: ENT;  Laterality: N/A;  Laser Microlaryngoscopy  NEED TO SCHEDULE LASER from [facility] [id number]    REIMPLANTATION OF PARATHYROID TISSUE N/A 1/6/2022    Procedure: REIMPLANTATION, PARATHYROID TISSUE;  Surgeon: Jesse James,  "MD;  Location: [facility] OR [address] [place] [address];  Service: ENT;  Laterality: N/A;    THYROIDECTOMY  1/6/2022    Procedure: THYROIDECTOMY;  Surgeon: Jesse James MD;  Location: [facility] OR [address] [place] [address];  Service: ENT;;    TRACHEOSTOMY N/A 12/27/2021    Procedure: CREATION, TRACHEOSTOMY;  Surgeon: Flex Espinosa MD;  Location: [facility] OR [address] [place] [address];  Service: ENT;  Laterality: N/A;        Family History   Problem Relation Age of Onset    Abnormal EKG Mother     Diabetes Father     Heart disease Father     Hypertension Father        Social History     Tobacco Use    Smoking status: Never Smoker    Smokeless tobacco: Never Used   Substance Use Topics    Alcohol use: Not Currently     Comment: occasional    Drug use: No         Vitals:    08/01/22 1003   BP: 110/62   Pulse: 98   Resp: 14   Temp: 96.6 °F (35.9 °C)        Physical Exam:  /62 (BP Location: Right arm, Patient Position: Sitting, BP Method: Medium (Automatic))   Pulse 98   Temp 96.6 °F (35.9 °C) (Temporal)   Resp 14   Ht 5' 6" (1.676 m)   Wt 62.7 kg (138 lb 3.7 oz)   SpO2 99%   BMI 22.31 kg/m²     Physical Exam  Vitals and nursing note reviewed.   Constitutional:       Appearance: Normal appearance.   HENT:      Head: Normocephalic and atraumatic.      Nose: Nose normal.      Mouth/Throat:      Mouth: Mucous membranes are moist.      Pharynx: Oropharynx is clear.   Eyes:      Extraocular Movements: Extraocular movements intact.      Conjunctiva/sclera: Conjunctivae normal.   Cardiovascular:      Rate and Rhythm: Normal rate and regular rhythm.      Heart sounds: Normal heart sounds.   Pulmonary:      Effort: Pulmonary effort is normal.      Breath sounds: Normal breath sounds.   Abdominal:      General: Abdomen is flat. Bowel sounds are normal.      Palpations: Abdomen is soft.   Musculoskeletal:         General: Normal range of motion.      Cervical back: Normal range of motion and neck supple.   Skin:     General: Skin is warm and dry.   Neurological:      " General: No focal deficit present.      Mental Status: He is alert and oriented to person, place, and time. Mental status is at baseline.   Psychiatric:         Mood and Affect: Mood normal.          Labs:  Lab Results   Component Value Date    WBC 4.66 07/29/2022    RBC 3.97 (L) 07/29/2022    HGB 9.7 (L) 07/29/2022    HCT 31.3 (L) 07/29/2022    MCV 79 (L) 07/29/2022    MCH 24.4 (L) 07/29/2022    MCHC 31.0 (L) 07/29/2022    RDW 19.5 (H) 07/29/2022     07/29/2022    MPV 10.1 07/29/2022    GRAN 56.0 07/29/2022    LYMPH 16.0 (L) 07/29/2022    MONO 6.0 07/29/2022    EOS CANCELED 07/26/2022    BASO CANCELED 07/26/2022    EOSINOPHIL 0.0 07/29/2022    BASOPHIL 0.0 07/29/2022     Sodium   Date Value Ref Range Status   07/29/2022 133 (L) 136 - 145 mmol/L Final     Potassium   Date Value Ref Range Status   07/29/2022 4.4 3.5 - 5.1 mmol/L Final     Chloride   Date Value Ref Range Status   07/29/2022 99 95 - 110 mmol/L Final     CO2   Date Value Ref Range Status   07/29/2022 26 23 - 29 mmol/L Final     Glucose   Date Value Ref Range Status   07/29/2022 176 (H) 70 - 110 mg/dL Final     BUN   Date Value Ref Range Status   07/29/2022 28 (H) 8 - 23 mg/dL Final     Creatinine   Date Value Ref Range Status   07/29/2022 1.0 0.5 - 1.4 mg/dL Final     Calcium   Date Value Ref Range Status   07/29/2022 8.9 8.7 - 10.5 mg/dL Final     Total Protein   Date Value Ref Range Status   07/29/2022 6.7 6.0 - 8.4 g/dL Final     Albumin   Date Value Ref Range Status   07/29/2022 3.6 3.5 - 5.2 g/dL Final   11/18/2020 3.7 3.6 - 5.1 g/dL Final     Comment:     For additional information, please refer to   http://education.[employer].com/faq/ESJ249 (This link is   being provided for informational/ educational purposes only.)  This test was developed and its analytical performance   characteristics have been determined by [facility]. It has not been cleared or approved by the   [place] Food and Drug  Administration. This assay has been validated   pursuant to the CLIA regulations and is used for clinical   purposes.  @ Test Performed By:  [address] [facility]  Yo Cortes M.D.,   [address] [place] [address]  [place]  [id number]       Total Bilirubin   Date Value Ref Range Status   07/29/2022 0.8 0.1 - 1.0 mg/dL Final     Comment:     For infants and newborns, interpretation of results should be based  on gestational age, weight and in agreement with clinical  observations.    Premature Infant recommended reference ranges:  Up to 24 hours.............<8.0 mg/dL  Up to 48 hours............<12.0 mg/dL  3-5 days..................<15.0 mg/dL  6-29 days.................<15.0 mg/dL       Alkaline Phosphatase   Date Value Ref Range Status   07/29/2022 76 55 - 135 U/L Final     AST   Date Value Ref Range Status   07/29/2022 14 10 - 40 U/L Final     ALT   Date Value Ref Range Status   07/29/2022 15 10 - 44 U/L Final     Anion Gap   Date Value Ref Range Status   07/29/2022 8 8 - 16 mmol/L Final     eGFR if    Date Value Ref Range Status   07/29/2022 >60.0 >60 mL/min/1.73 m^2 Final     eGFR if non    Date Value Ref Range Status   07/29/2022 >60.0 >60 mL/min/1.73 m^2 Final     Comment:     Calculation used to obtain the estimated glomerular filtration  rate (eGFR) is the CKD-EPI equation.          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  I instructed him again on how to take it and he states he will try this time as the nausea vomiting seem to be delayed after 2 days of chemo.      Aurash Khoobehi, MD  Hematology and Oncology

## 2022-08-01 NOTE — PLAN OF CARE
Problem: Fatigue  Goal: Improved Activity Tolerance  Outcome: Ongoing, Progressing  Intervention: Promote Improved Energy  Flowsheets (Taken 8/1/2022 1051)  Fatigue Management:   frequent rest breaks encouraged   fatigue-related activity identified   paced activity encouraged  Sleep/Rest Enhancement:   regular sleep/rest pattern promoted   relaxation techniques promoted

## 2022-08-01 NOTE — PROGRESS NOTES
Subjective:     HPI    I had the pleasure of seeing Cyrus Batres Jr. in consultation at your request for the evaluation of AF. He is a 72M with HTN, HLD, thyroidectomy on synthroid, DM2, and squamous cell carcinoma of the larynx (status post debulking followed by IMRT to larynx with subsequent persistent/recurrent disease refractory to stripping, ultimately requiring salvage laryngectomy in 1/2022--currently on carboplatin, 5 FU, Keytruda), whose history of AF dates back to 2015 when he presented to [facility] with ADHF and was found to be in AF. He spontaneously converted to sinus rhythm and was discharged  Since then he has had recurrent AF but it has been asymptomatic. At this point he has episodes which he notes on his AppleWatch, occurring several times per year and lasting less than 1 minute. Triggers include eating.    Mr. Batres was on eliquis and diltiazem for a period of time--it seems like both these meds were recently stopped.    I reviewed all ECGs in the EMR dating back to 3/2020. They all show sinus rhythm.    Review of Systems   Constitutional: Positive for malaise/fatigue. Negative for decreased appetite, weight gain and weight loss.   HENT: Negative for sore throat.    Eyes: Negative for blurred vision.   Cardiovascular: Negative for chest pain, dyspnea on exertion, irregular heartbeat, leg swelling, near-syncope, orthopnea, palpitations, paroxysmal nocturnal dyspnea and syncope.   Respiratory: Negative for shortness of breath.    Skin: Negative for rash.   Musculoskeletal: Negative for arthritis.   Gastrointestinal: Negative for abdominal pain.   Neurological: Negative for focal weakness.   Psychiatric/Behavioral: Negative for altered mental status.        Objective:    Physical Exam  Vitals and nursing note reviewed.   Constitutional:       General: He is not in acute distress.     Appearance: He is well-developed.   HENT:      Head: Normocephalic and atraumatic.   Eyes:      General: No scleral  icterus.     Pupils: Pupils are equal, round, and reactive to light.   Neck:      Thyroid: No thyromegaly.   Cardiovascular:      Rate and Rhythm: Regular rhythm.      Pulses: Normal pulses.      Heart sounds: Normal heart sounds. No murmur heard.    No friction rub. No gallop.   Pulmonary:      Effort: Pulmonary effort is normal.      Breath sounds: Normal breath sounds.   Abdominal:      General: Bowel sounds are normal. There is no distension.      Palpations: Abdomen is soft.      Tenderness: There is no abdominal tenderness.   Musculoskeletal:      Cervical back: Neck supple.   Skin:     General: Skin is warm and dry.      Findings: No rash.   Neurological:      Mental Status: He is alert and oriented to person, place, and time.   Psychiatric:         Behavior: Behavior normal.           Assessment:       1. Paroxysmal atrial fibrillation    2. Pure hypercholesterolemia    3. Benign hypertension    4. NSVT (nonsustained ventricular tachycardia)         Plan:       In summary, Cyrus Batres Jr. is a 72M with asymptomatic PAF. His [id number] Score is 3 (age, HTN, DM) but given how infrequent and short-lived his episodes are my bias is to hold off on restarting eliquis.    Given his episodes are rare and self-limited, my bias is to hold the course and see me PRN.    Thank you for allowing me to participate in the care of this patient. Please do not hesitate to call me with any questions or concerns.

## 2022-08-03 ENCOUNTER — PATIENT MESSAGE (OUTPATIENT)
Dept: CARDIOLOGY | Facility: CLINIC | Age: 72
End: 2022-08-03

## 2022-08-03 ENCOUNTER — OFFICE VISIT (OUTPATIENT)
Dept: CARDIOLOGY | Facility: CLINIC | Age: 72
End: 2022-08-03
Payer: MEDICARE

## 2022-08-03 VITALS
DIASTOLIC BLOOD PRESSURE: 64 MMHG | WEIGHT: 138.25 LBS | HEART RATE: 69 BPM | BODY MASS INDEX: 22.22 KG/M2 | HEIGHT: 66 IN | RESPIRATION RATE: 16 BRPM | OXYGEN SATURATION: 99 % | SYSTOLIC BLOOD PRESSURE: 110 MMHG

## 2022-08-03 DIAGNOSIS — E78.00 PURE HYPERCHOLESTEROLEMIA: Chronic | ICD-10-CM

## 2022-08-03 DIAGNOSIS — I10 BENIGN HYPERTENSION: Chronic | ICD-10-CM

## 2022-08-03 DIAGNOSIS — I48.0 PAROXYSMAL ATRIAL FIBRILLATION: Primary | Chronic | ICD-10-CM

## 2022-08-03 DIAGNOSIS — I47.29 NSVT (NONSUSTAINED VENTRICULAR TACHYCARDIA): Chronic | ICD-10-CM

## 2022-08-03 PROCEDURE — 1126F PR PAIN SEVERITY QUANTIFIED, NO PAIN PRESENT: ICD-10-PCS | Mod: CPTII,S$GLB,, | Performed by: INTERNAL MEDICINE

## 2022-08-03 PROCEDURE — 3044F HG A1C LEVEL LT 7.0%: CPT | Mod: CPTII,S$GLB,, | Performed by: INTERNAL MEDICINE

## 2022-08-03 PROCEDURE — 3074F SYST BP LT 130 MM HG: CPT | Mod: CPTII,S$GLB,, | Performed by: INTERNAL MEDICINE

## 2022-08-03 PROCEDURE — 4010F PR ACE/ARB THEARPY RXD/TAKEN: ICD-10-PCS | Mod: CPTII,S$GLB,, | Performed by: INTERNAL MEDICINE

## 2022-08-03 PROCEDURE — 1159F MED LIST DOCD IN RCRD: CPT | Mod: CPTII,S$GLB,, | Performed by: INTERNAL MEDICINE

## 2022-08-03 PROCEDURE — 1159F PR MEDICATION LIST DOCUMENTED IN MEDICAL RECORD: ICD-10-PCS | Mod: CPTII,S$GLB,, | Performed by: INTERNAL MEDICINE

## 2022-08-03 PROCEDURE — 1126F AMNT PAIN NOTED NONE PRSNT: CPT | Mod: CPTII,S$GLB,, | Performed by: INTERNAL MEDICINE

## 2022-08-03 PROCEDURE — 3288F PR FALLS RISK ASSESSMENT DOCUMENTED: ICD-10-PCS | Mod: CPTII,S$GLB,, | Performed by: INTERNAL MEDICINE

## 2022-08-03 PROCEDURE — 4010F ACE/ARB THERAPY RXD/TAKEN: CPT | Mod: CPTII,S$GLB,, | Performed by: INTERNAL MEDICINE

## 2022-08-03 PROCEDURE — 3066F PR DOCUMENTATION OF TREATMENT FOR NEPHROPATHY: ICD-10-PCS | Mod: CPTII,S$GLB,, | Performed by: INTERNAL MEDICINE

## 2022-08-03 PROCEDURE — 3078F DIAST BP <80 MM HG: CPT | Mod: CPTII,S$GLB,, | Performed by: INTERNAL MEDICINE

## 2022-08-03 PROCEDURE — 3044F PR MOST RECENT HEMOGLOBIN A1C LEVEL <7.0%: ICD-10-PCS | Mod: CPTII,S$GLB,, | Performed by: INTERNAL MEDICINE

## 2022-08-03 PROCEDURE — 3066F NEPHROPATHY DOC TX: CPT | Mod: CPTII,S$GLB,, | Performed by: INTERNAL MEDICINE

## 2022-08-03 PROCEDURE — 3008F PR BODY MASS INDEX (BMI) DOCUMENTED: ICD-10-PCS | Mod: CPTII,S$GLB,, | Performed by: INTERNAL MEDICINE

## 2022-08-03 PROCEDURE — 3074F PR MOST RECENT SYSTOLIC BLOOD PRESSURE < 130 MM HG: ICD-10-PCS | Mod: CPTII,S$GLB,, | Performed by: INTERNAL MEDICINE

## 2022-08-03 PROCEDURE — 99204 PR OFFICE/OUTPT VISIT, NEW, LEVL IV, 45-59 MIN: ICD-10-PCS | Mod: S$GLB,,, | Performed by: INTERNAL MEDICINE

## 2022-08-03 PROCEDURE — 3060F PR POS MICROALBUMINURIA RESULT DOCUMENTED/REVIEW: ICD-10-PCS | Mod: CPTII,S$GLB,, | Performed by: INTERNAL MEDICINE

## 2022-08-03 PROCEDURE — 1101F PR PT FALLS ASSESS DOC 0-1 FALLS W/OUT INJ PAST YR: ICD-10-PCS | Mod: CPTII,S$GLB,, | Performed by: INTERNAL MEDICINE

## 2022-08-03 PROCEDURE — 3060F POS MICROALBUMINURIA REV: CPT | Mod: CPTII,S$GLB,, | Performed by: INTERNAL MEDICINE

## 2022-08-03 PROCEDURE — 3288F FALL RISK ASSESSMENT DOCD: CPT | Mod: CPTII,S$GLB,, | Performed by: INTERNAL MEDICINE

## 2022-08-03 PROCEDURE — 1101F PT FALLS ASSESS-DOCD LE1/YR: CPT | Mod: CPTII,S$GLB,, | Performed by: INTERNAL MEDICINE

## 2022-08-03 PROCEDURE — 99204 OFFICE O/P NEW MOD 45 MIN: CPT | Mod: S$GLB,,, | Performed by: INTERNAL MEDICINE

## 2022-08-03 PROCEDURE — 3078F PR MOST RECENT DIASTOLIC BLOOD PRESSURE < 80 MM HG: ICD-10-PCS | Mod: CPTII,S$GLB,, | Performed by: INTERNAL MEDICINE

## 2022-08-03 PROCEDURE — 3008F BODY MASS INDEX DOCD: CPT | Mod: CPTII,S$GLB,, | Performed by: INTERNAL MEDICINE

## 2022-08-05 ENCOUNTER — INFUSION (OUTPATIENT)
Dept: INFUSION THERAPY | Facility: HOSPITAL | Age: 72
End: 2022-08-05
Attending: STUDENT IN AN ORGANIZED HEALTH CARE EDUCATION/TRAINING PROGRAM
Payer: MEDICARE

## 2022-08-05 VITALS
HEART RATE: 66 BPM | HEIGHT: 66 IN | DIASTOLIC BLOOD PRESSURE: 70 MMHG | TEMPERATURE: 98 F | WEIGHT: 136 LBS | RESPIRATION RATE: 17 BRPM | SYSTOLIC BLOOD PRESSURE: 119 MMHG | OXYGEN SATURATION: 98 % | BODY MASS INDEX: 21.86 KG/M2

## 2022-08-05 DIAGNOSIS — C01 PRIMARY CANCER OF BASE OF TONGUE: Primary | ICD-10-CM

## 2022-08-05 DIAGNOSIS — D50.0 IRON DEFICIENCY ANEMIA DUE TO CHRONIC BLOOD LOSS: ICD-10-CM

## 2022-08-05 PROCEDURE — 25000003 PHARM REV CODE 250: Performed by: INTERNAL MEDICINE

## 2022-08-05 PROCEDURE — 96365 THER/PROPH/DIAG IV INF INIT: CPT

## 2022-08-05 PROCEDURE — 25000003 PHARM REV CODE 250: Performed by: STUDENT IN AN ORGANIZED HEALTH CARE EDUCATION/TRAINING PROGRAM

## 2022-08-05 PROCEDURE — 63600175 PHARM REV CODE 636 W HCPCS: Performed by: STUDENT IN AN ORGANIZED HEALTH CARE EDUCATION/TRAINING PROGRAM

## 2022-08-05 PROCEDURE — 63600175 PHARM REV CODE 636 W HCPCS: Performed by: INTERNAL MEDICINE

## 2022-08-05 PROCEDURE — A4216 STERILE WATER/SALINE, 10 ML: HCPCS | Performed by: INTERNAL MEDICINE

## 2022-08-05 RX ORDER — SODIUM CHLORIDE 0.9 % (FLUSH) 0.9 %
10 SYRINGE (ML) INJECTION
Status: DISCONTINUED | OUTPATIENT
Start: 2022-08-05 | End: 2022-08-05 | Stop reason: HOSPADM

## 2022-08-05 RX ORDER — HEPARIN 100 UNIT/ML
500 SYRINGE INTRAVENOUS
Status: DISCONTINUED | OUTPATIENT
Start: 2022-08-05 | End: 2022-08-05 | Stop reason: HOSPADM

## 2022-08-05 RX ADMIN — IRON SUCROSE: 20 INJECTION, SOLUTION INTRAVENOUS at 02:08

## 2022-08-05 RX ADMIN — SODIUM CHLORIDE: 9 INJECTION, SOLUTION INTRAVENOUS at 02:08

## 2022-08-05 RX ADMIN — HEPARIN 500 UNITS: 100 SYRINGE at 04:08

## 2022-08-05 RX ADMIN — SODIUM CHLORIDE, PRESERVATIVE FREE 10 ML: 5 INJECTION INTRAVENOUS at 04:08

## 2022-08-05 NOTE — PLAN OF CARE
Problem: Anemia  Goal: Anemia Symptom Improvement  Outcome: Ongoing, Progressing  Intervention: Monitor and Manage Anemia  Flowsheets (Taken 8/5/2022 1409)  Oral Nutrition Promotion: rest periods promoted  Safety Promotion/Fall Prevention: medications reviewed  Fatigue Management: frequent rest breaks encouraged

## 2022-08-08 ENCOUNTER — PATIENT MESSAGE (OUTPATIENT)
Dept: HEMATOLOGY/ONCOLOGY | Facility: CLINIC | Age: 72
End: 2022-08-08
Payer: MEDICARE

## 2022-08-11 ENCOUNTER — PATIENT MESSAGE (OUTPATIENT)
Dept: HEMATOLOGY/ONCOLOGY | Facility: CLINIC | Age: 72
End: 2022-08-11
Payer: MEDICARE

## 2022-08-12 ENCOUNTER — INFUSION (OUTPATIENT)
Dept: INFUSION THERAPY | Facility: HOSPITAL | Age: 72
End: 2022-08-12
Attending: STUDENT IN AN ORGANIZED HEALTH CARE EDUCATION/TRAINING PROGRAM
Payer: MEDICARE

## 2022-08-12 VITALS
RESPIRATION RATE: 16 BRPM | HEART RATE: 83 BPM | DIASTOLIC BLOOD PRESSURE: 61 MMHG | SYSTOLIC BLOOD PRESSURE: 102 MMHG | OXYGEN SATURATION: 100 % | TEMPERATURE: 98 F | HEIGHT: 66 IN | BODY MASS INDEX: 21.97 KG/M2 | WEIGHT: 136.69 LBS

## 2022-08-12 DIAGNOSIS — R11.2 CHEMOTHERAPY-INDUCED NAUSEA AND VOMITING: Primary | ICD-10-CM

## 2022-08-12 DIAGNOSIS — K52.1 CHEMOTHERAPY INDUCED DIARRHEA: Primary | ICD-10-CM

## 2022-08-12 DIAGNOSIS — T45.1X5A CHEMOTHERAPY-INDUCED NAUSEA AND VOMITING: Primary | ICD-10-CM

## 2022-08-12 DIAGNOSIS — D50.0 IRON DEFICIENCY ANEMIA DUE TO CHRONIC BLOOD LOSS: Primary | ICD-10-CM

## 2022-08-12 DIAGNOSIS — T45.1X5A CHEMOTHERAPY INDUCED DIARRHEA: ICD-10-CM

## 2022-08-12 DIAGNOSIS — K52.1 CHEMOTHERAPY INDUCED DIARRHEA: ICD-10-CM

## 2022-08-12 DIAGNOSIS — T45.1X5A CHEMOTHERAPY INDUCED DIARRHEA: Primary | ICD-10-CM

## 2022-08-12 PROCEDURE — 96365 THER/PROPH/DIAG IV INF INIT: CPT

## 2022-08-12 PROCEDURE — A4216 STERILE WATER/SALINE, 10 ML: HCPCS | Performed by: STUDENT IN AN ORGANIZED HEALTH CARE EDUCATION/TRAINING PROGRAM

## 2022-08-12 PROCEDURE — 63600175 PHARM REV CODE 636 W HCPCS: Performed by: STUDENT IN AN ORGANIZED HEALTH CARE EDUCATION/TRAINING PROGRAM

## 2022-08-12 PROCEDURE — 25000003 PHARM REV CODE 250: Performed by: STUDENT IN AN ORGANIZED HEALTH CARE EDUCATION/TRAINING PROGRAM

## 2022-08-12 RX ORDER — DIPHENOXYLATE HCL/ATROPINE 2.5-.025/5
5 LIQUID (ML) ORAL 4 TIMES DAILY PRN
Qty: 60 ML | Refills: 5 | Status: SHIPPED | OUTPATIENT
Start: 2022-08-12 | End: 2022-08-15 | Stop reason: SDUPTHER

## 2022-08-12 RX ORDER — HEPARIN 100 UNIT/ML
500 SYRINGE INTRAVENOUS
Status: DISCONTINUED | OUTPATIENT
Start: 2022-08-12 | End: 2022-08-12 | Stop reason: HOSPADM

## 2022-08-12 RX ORDER — DIPHENOXYLATE HYDROCHLORIDE AND ATROPINE SULFATE 2.5; .025 MG/1; MG/1
1 TABLET ORAL 4 TIMES DAILY PRN
Qty: 60 TABLET | Refills: 2 | Status: SHIPPED | OUTPATIENT
Start: 2022-08-12 | End: 2022-08-15

## 2022-08-12 RX ORDER — SODIUM CHLORIDE 0.9 % (FLUSH) 0.9 %
10 SYRINGE (ML) INJECTION
Status: DISCONTINUED | OUTPATIENT
Start: 2022-08-12 | End: 2022-08-12 | Stop reason: HOSPADM

## 2022-08-12 RX ADMIN — HEPARIN 500 UNITS: 100 SYRINGE at 04:08

## 2022-08-12 RX ADMIN — SODIUM CHLORIDE: 0.9 INJECTION, SOLUTION INTRAVENOUS at 02:08

## 2022-08-12 RX ADMIN — IRON SUCROSE: 20 INJECTION, SOLUTION INTRAVENOUS at 02:08

## 2022-08-12 RX ADMIN — SODIUM CHLORIDE, PRESERVATIVE FREE 10 ML: 5 INJECTION INTRAVENOUS at 04:08

## 2022-08-12 NOTE — PLAN OF CARE
Problem: Anemia  Goal: Anemia Symptom Improvement  Outcome: Ongoing, Progressing  Intervention: Monitor and Manage Anemia  Flowsheets (Taken 8/12/2022 1411)  Oral Nutrition Promotion:   rest periods promoted   medicated   nutritional therapy counseling provided  Safety Promotion/Fall Prevention:   instructed to call staff for mobility   supervised activity   in recliner, wheels locked   Fall Risk reviewed with patient/family  Fatigue Management:   frequent rest breaks encouraged   fatigue-related activity identified

## 2022-08-15 DIAGNOSIS — T45.1X5A CHEMOTHERAPY INDUCED DIARRHEA: ICD-10-CM

## 2022-08-15 DIAGNOSIS — L89.159 DECUBITUS ULCER OF COCCYGEAL REGION, UNSPECIFIED ULCER STAGE: Primary | ICD-10-CM

## 2022-08-15 DIAGNOSIS — K52.1 CHEMOTHERAPY INDUCED DIARRHEA: ICD-10-CM

## 2022-08-15 RX ORDER — DIPHENOXYLATE HCL/ATROPINE 2.5-.025/5
5 LIQUID (ML) ORAL 4 TIMES DAILY PRN
Qty: 60 ML | Refills: 5 | Status: SHIPPED | OUTPATIENT
Start: 2022-08-15 | End: 2022-08-25

## 2022-08-18 ENCOUNTER — OFFICE VISIT (OUTPATIENT)
Dept: WOUND CARE | Facility: HOSPITAL | Age: 72
End: 2022-08-18
Attending: FAMILY MEDICINE
Payer: MEDICARE

## 2022-08-18 VITALS
HEART RATE: 100 BPM | RESPIRATION RATE: 18 BRPM | SYSTOLIC BLOOD PRESSURE: 112 MMHG | DIASTOLIC BLOOD PRESSURE: 68 MMHG | TEMPERATURE: 98 F

## 2022-08-18 DIAGNOSIS — L89.151 PRESSURE INJURY OF SACRAL REGION, STAGE 1: ICD-10-CM

## 2022-08-18 DIAGNOSIS — C32.9 LARYNX CANCER: Primary | ICD-10-CM

## 2022-08-18 DIAGNOSIS — C01 PRIMARY CANCER OF BASE OF TONGUE: ICD-10-CM

## 2022-08-18 PROCEDURE — 1159F PR MEDICATION LIST DOCUMENTED IN MEDICAL RECORD: ICD-10-PCS | Mod: CPTII,,, | Performed by: FAMILY MEDICINE

## 2022-08-18 PROCEDURE — 3044F PR MOST RECENT HEMOGLOBIN A1C LEVEL <7.0%: ICD-10-PCS | Mod: CPTII,,, | Performed by: FAMILY MEDICINE

## 2022-08-18 PROCEDURE — 3066F PR DOCUMENTATION OF TREATMENT FOR NEPHROPATHY: ICD-10-PCS | Mod: CPTII,,, | Performed by: FAMILY MEDICINE

## 2022-08-18 PROCEDURE — 4010F PR ACE/ARB THEARPY RXD/TAKEN: ICD-10-PCS | Mod: CPTII,,, | Performed by: FAMILY MEDICINE

## 2022-08-18 PROCEDURE — 3044F HG A1C LEVEL LT 7.0%: CPT | Mod: CPTII,,, | Performed by: FAMILY MEDICINE

## 2022-08-18 PROCEDURE — 3060F POS MICROALBUMINURIA REV: CPT | Mod: CPTII,,, | Performed by: FAMILY MEDICINE

## 2022-08-18 PROCEDURE — 4010F ACE/ARB THERAPY RXD/TAKEN: CPT | Mod: CPTII,,, | Performed by: FAMILY MEDICINE

## 2022-08-18 PROCEDURE — 3060F PR POS MICROALBUMINURIA RESULT DOCUMENTED/REVIEW: ICD-10-PCS | Mod: CPTII,,, | Performed by: FAMILY MEDICINE

## 2022-08-18 PROCEDURE — 99202 OFFICE O/P NEW SF 15 MIN: CPT | Mod: ,,, | Performed by: FAMILY MEDICINE

## 2022-08-18 PROCEDURE — 1159F MED LIST DOCD IN RCRD: CPT | Mod: CPTII,,, | Performed by: FAMILY MEDICINE

## 2022-08-18 PROCEDURE — 1160F PR REVIEW ALL MEDS BY PRESCRIBER/CLIN PHARMACIST DOCUMENTED: ICD-10-PCS | Mod: CPTII,,, | Performed by: FAMILY MEDICINE

## 2022-08-18 PROCEDURE — 99202 PR OFFICE/OUTPT VISIT, NEW, LEVL II, 15-29 MIN: ICD-10-PCS | Mod: ,,, | Performed by: FAMILY MEDICINE

## 2022-08-18 PROCEDURE — 1160F RVW MEDS BY RX/DR IN RCRD: CPT | Mod: CPTII,,, | Performed by: FAMILY MEDICINE

## 2022-08-18 PROCEDURE — 99213 OFFICE O/P EST LOW 20 MIN: CPT | Performed by: FAMILY MEDICINE

## 2022-08-18 PROCEDURE — 3066F NEPHROPATHY DOC TX: CPT | Mod: CPTII,,, | Performed by: FAMILY MEDICINE

## 2022-08-18 NOTE — PROGRESS NOTES
Chief complaint:  No chief complaint on file.      HPI:  Cyrus Batres Jr. is a 72 y.o. male presenting with a stage 1 pressure ulcer of the sacrum. Pt had an open wound prior to coming in, but it has skinned over and is a stage one now when we saw him. Pt has a hx of throat CA, and has been sitting and laying around since the diagnosis. He developed the wound, and his wife has been treating it till now. Presents today with a stage 1 sacral pressure ulcer. No other complaints today.    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    Postoperative hypothyroidism 7/7/2022       Social History     Socioeconomic History    Marital status:      Spouse name: Janel Batres    Number of children: 2   Occupational History    Occupation: [employer]     Employer: [employer]   Tobacco Use    Smoking status: Never Smoker    Smokeless tobacco: Never Used   Substance and Sexual Activity    Alcohol use: Not Currently     Comment: occasional    Drug use: No    Sexual activity: Yes     Partners: Female   Social History Narrative    2 children from his 1st wife       Past Surgical History:   Procedure Laterality Date    DIRECT LARYNGOBRONCHOSCOPY N/A 12/27/2021    Procedure: LARYNGOSCOPY, DIRECT, WITH BRONCHOSCOPY;  Surgeon: Flex Espinosa MD;  Location: [facility] OR [address] [place] [address];  Service: ENT;  Laterality: N/A;    DISSECTION OF NECK Bilateral 1/6/2022    Procedure: DISSECTION, NECK;  Surgeon: Jesse James MD;  Location: [facility] OR [address] [place] [address];  Service: ENT;  Laterality: Bilateral;    FLAP PROCEDURE Right 1/6/2022    Procedure: CREATION, FREE FLAP;  Surgeon: Alise Hart MD;  Location: [facility] OR [address] [place] [address];  Service: ENT;  Laterality: Right;  Ischemic start 1351  Ischemic stop 1502    INSERTION OF TUNNELED CENTRAL VENOUS CATHETER  (CVC) WITH SUBCUTANEOUS PORT N/A 6/9/2022    Procedure: [id number]-A-CATH;  Surgeon: Jesus Viera MD;  Location: [facility];  Service: General;  Laterality: N/A;    LARYNGECTOMY N/A 1/6/2022    Procedure: LARYNGECTOMY;  Surgeon: Jesse James MD;  [facility];  Service: ENT;  Laterality: N/A;    LARYNGOSCOPY N/A 8/4/2020    Procedure: Suspension microlaryngoscopy with biopsy, possible KTP laser treatment/excision;  Surgeon: Stew Noel MD;  [facility];  Service: ENT;  Laterality: N/A;  Microscope, telescopes, tower, microinstruments, KTP laser, rep conf# [id number] IC 7/28.    LARYNGOSCOPY N/A 3/16/2021    Procedure: Suspension microlaryngoscopy with excision of lesion, possible CO2 laser;  Surgeon: Stew Noel MD;  [facility];  Service: ENT;  Laterality: N/A;  Microscope, telescopes, tower, microinstruments, CO2 laser, rep conf# [id number] IC 3/4.    LARYNGOSCOPY N/A 4/1/2021    Procedure: Suspension microlaryngoscopy with KTP laser excision of lesion;  Surgeon: Stew Noel MD;  [facility];  Service: ENT;  Laterality: N/A;  Microscope, telescopes, tower, microinstruments, 70 degree scope, vocal fold , KTP laser, rep conf# [id number] BC    LARYNGOSCOPY N/A 12/9/2021    Procedure: Suspension microlaryngoscopy with biopsy;  Surgeon: Stew Noel MD;  [facility];  Service: ENT;  Laterality: N/A;  Microscope, telescopes, tower, microinstruments    LARYNGOSCOPY N/A 1/6/2022    Procedure: LARYNGOSCOPY;  Surgeon: Jesse James MD;  [facility];  Service: ENT;  Laterality: N/A;    LARYNGOSCOPY N/A 4/27/2022    Procedure: LARYNGOSCOPY WITH BIOPSY;  Surgeon: Jesse James MD;  Location: [facility] OR [address] [place] [address];  Service: ENT;  Laterality: N/A;    MICROLARYNGOSCOPY N/A 3/17/2020    Procedure: MICROLARYNGOSCOPY;  Surgeon: Jung Xiao MD;  Location: [facility] OR;  Service: ENT;   Laterality: N/A;  Laser Microlaryngoscopy  NEED TO SCHEDULE LASER from [facility] [phone]    REIMPLANTATION OF PARATHYROID TISSUE N/A 1/6/2022    Procedure: REIMPLANTATION, PARATHYROID TISSUE;  Surgeon: Jesse James MD;  [facility];  Service: ENT;  Laterality: N/A;    THYROIDECTOMY  1/6/2022    Procedure: THYROIDECTOMY;  Surgeon: Jesse James MD;  [facility];  Service: ENT;;    TRACHEOSTOMY N/A 12/27/2021    Procedure: CREATION, TRACHEOSTOMY;  Surgeon: Flex Espinosa MD;  [facility];  Service: ENT;  Laterality: N/A;       Family History   Problem Relation Age of Onset    Abnormal EKG Mother     Diabetes Father     Heart disease Father     Hypertension Father        Review of patient's allergies indicates:   Allergen Reactions    Pollen extracts     Lovastatin Rash     Not confirmed but pt skeptical       Current Outpatient Medications on File Prior to Visit   Medication Sig Dispense Refill    acetaminophen (TYLENOL) 325 MG tablet Take 325 mg by mouth every 6 (six) hours as needed for Pain.      amoxicillin-clavulanate (AUGMENTIN) 200-28.5 mg/5 mL SusR Take 10 mLs (400 mg total) by mouth every 12 (twelve) hours. Peg Tube 100 mL 0    cefdinir (OMNICEF) 250 mg/5 mL suspension Take 5 mLs (250 mg total) by mouth every 12 (twelve) hours. 70 mL 0    dexAMETHasone (DECADRON) 4 MG Tab Take 2 tablets (8 mg total) by mouth once daily. Take as directed on days 2, 3, and 4 of your chemotherapy cycle. 24 tablet 2    diphenhydrAMINE (BENADRYL) 25 mg capsule Take 25 mg by mouth every 6 (six) hours as needed for Itching.      diphenoxylate-atropine 2.5-0.025 mg/5 ml (LOMOTIL) 2.5-0.025 mg/5 mL liquid Take 5 mLs by mouth 4 (four) times daily as needed (diarrhea). 60 mL 5    esomeprazole (NEXIUM) 40 MG capsule Take 40 mg by mouth before breakfast.      levothyroxine (SYNTHROID) 100 MCG tablet Take 1 tablet (100 mcg total) by mouth before breakfast.  30 tablet 11    losartan (COZAAR) 100 MG tablet TAKE 1 TABLET BY MOUTH EVERY DAY (Patient taking differently: Take 100 mg by mouth every evening.) 90 tablet 3    metFORMIN (GLUCOPHAGE) 500 MG tablet Take 1 tablet (500 mg total) by mouth 2 (two) times daily with meals. 60 tablet 3    nut.tx.gluc intol,lf,soy-fiber (GLUCERNA 1.5 TRISHA) 0.08-1.5 gram-kcal/mL Liqd 5 Cans by PEG Tube route 5 (five) times daily. 150 each 5    OLANZapine (ZYPREXA) 5 MG tablet Take 1 tablet (5 mg total) by mouth every evening. Take as directed on days 1-4 of your chemotherapy cycle. (Patient not taking: No sig reported) 30 tablet 5    ondansetron (ZOFRAN-ODT) 8 MG TbDL Take 1 tablet (8 mg total) by mouth every 8 (eight) hours as needed (nausea/vomiting). 60 tablet 5    prednisone 5 mg/5 mL Soln Take 20 mLs (20 mg total) by mouth once daily. Via peg  tube 100 mL 0    promethazine-dextromethorphan (PROMETHAZINE-DM) 6.25-15 mg/5 mL Syrp Take 5 mLs by mouth every 8 (eight) hours as needed (As needed for cough Via Peg  tube). Do not drive on this because of drowsiness 120 mL 1    traZODone (DESYREL) 50 MG tablet Take 1 tablet (50 mg total) by mouth nightly as needed for Insomnia. 30 tablet 2    [DISCONTINUED] aspirin (ECOTRIN) 81 MG EC tablet Take 81 mg by mouth Daily.       Current Facility-Administered Medications on File Prior to Visit   Medication Dose Route Frequency Provider Last Rate Last Admin    lactated ringers infusion   Intravenous Continuous Torsten Hilton MD           ROS: As per HPI and below:  10 point ROS all negative except that noted in the HPI      Physical Exam:     There were no vitals filed for this visit.        There is no height or weight on file to calculate BMI.          General:             Well developed, well nourished, no apparent distress  HEENT:              NCAT, no JVD, mucous membranes moist, EOM intact, stoma intact  Cardiovascular:  Regular rate and rhythm, normal S1, normal S2, No murmurs, rubs,  or gallops  Respiratory:        Normal breath sounds, no wheezes, no rales, no rhonchi  Abdomen:           Bowel sounds present, non tender, non distended, no masses, no hepatojugular reflux  Extremities:        No clubbing, no cyanosis, no edema  Neurological:      No focal deficits  Skin:                   Stage 1 sacral pressure ulcer, was open but now resolving    Lab Results   Component Value Date    WBC 4.66 07/29/2022    HGB 9.7 (L) 07/29/2022    HCT 31.3 (L) 07/29/2022    MCV 79 (L) 07/29/2022     07/29/2022     Lab Results   Component Value Date    CHOL 144 02/14/2022    CHOL 174 11/18/2020    CHOL 181 02/04/2020     Lab Results   Component Value Date    HDL 48 02/14/2022    HDL 37 (L) 11/18/2020    HDL 39 (L) 02/04/2020     Lab Results   Component Value Date    LDLCALC 75.4 02/14/2022    LDLCALC 110.8 11/18/2020    LDLCALC 99.6 02/04/2020     Lab Results   Component Value Date    TRIG 103 02/14/2022    TRIG 131 11/18/2020    TRIG 212 (H) 02/04/2020     Lab Results   Component Value Date    CHOLHDL 33.3 02/14/2022    CHOLHDL 21.3 11/18/2020    CHOLHDL 21.5 02/04/2020     CMP     Lab Results   Component Value Date    TSH 0.080 (L) 07/11/2022             Assessment and Recommendations       Diagnoses:    1. Stage 1 sacral pressure ulcer    Plan:  1. Nearly resolved  2.  Much of the documentation for this visit was completed in the [employer] system.  Please see the attached documentation for further details about the patient's care. Scanned under the Media tab.

## 2022-08-19 ENCOUNTER — INFUSION (OUTPATIENT)
Dept: INFUSION THERAPY | Facility: HOSPITAL | Age: 72
End: 2022-08-19
Attending: STUDENT IN AN ORGANIZED HEALTH CARE EDUCATION/TRAINING PROGRAM
Payer: MEDICARE

## 2022-08-19 VITALS
WEIGHT: 138 LBS | HEART RATE: 87 BPM | OXYGEN SATURATION: 100 % | DIASTOLIC BLOOD PRESSURE: 69 MMHG | RESPIRATION RATE: 19 BRPM | SYSTOLIC BLOOD PRESSURE: 108 MMHG | TEMPERATURE: 98 F | BODY MASS INDEX: 22.18 KG/M2 | HEIGHT: 66 IN

## 2022-08-19 DIAGNOSIS — R11.0 CHEMOTHERAPY-INDUCED NAUSEA: Primary | ICD-10-CM

## 2022-08-19 DIAGNOSIS — T45.1X5A CHEMOTHERAPY-INDUCED NAUSEA: Primary | ICD-10-CM

## 2022-08-19 DIAGNOSIS — D50.0 IRON DEFICIENCY ANEMIA DUE TO CHRONIC BLOOD LOSS: Primary | ICD-10-CM

## 2022-08-19 PROCEDURE — 63600175 PHARM REV CODE 636 W HCPCS: Performed by: STUDENT IN AN ORGANIZED HEALTH CARE EDUCATION/TRAINING PROGRAM

## 2022-08-19 PROCEDURE — 25000003 PHARM REV CODE 250: Performed by: STUDENT IN AN ORGANIZED HEALTH CARE EDUCATION/TRAINING PROGRAM

## 2022-08-19 PROCEDURE — A4216 STERILE WATER/SALINE, 10 ML: HCPCS | Performed by: STUDENT IN AN ORGANIZED HEALTH CARE EDUCATION/TRAINING PROGRAM

## 2022-08-19 PROCEDURE — 96365 THER/PROPH/DIAG IV INF INIT: CPT

## 2022-08-19 RX ORDER — HEPARIN 100 UNIT/ML
500 SYRINGE INTRAVENOUS
Status: DISCONTINUED | OUTPATIENT
Start: 2022-08-19 | End: 2022-08-19 | Stop reason: HOSPADM

## 2022-08-19 RX ORDER — SODIUM CHLORIDE 0.9 % (FLUSH) 0.9 %
10 SYRINGE (ML) INJECTION
Status: DISCONTINUED | OUTPATIENT
Start: 2022-08-19 | End: 2022-08-19 | Stop reason: HOSPADM

## 2022-08-19 RX ADMIN — SODIUM CHLORIDE: 9 INJECTION, SOLUTION INTRAVENOUS at 02:08

## 2022-08-19 RX ADMIN — HEPARIN 500 UNITS: 100 SYRINGE at 03:08

## 2022-08-19 RX ADMIN — IRON SUCROSE: 20 INJECTION, SOLUTION INTRAVENOUS at 02:08

## 2022-08-19 RX ADMIN — SODIUM CHLORIDE, PRESERVATIVE FREE 10 ML: 5 INJECTION INTRAVENOUS at 03:08

## 2022-08-19 NOTE — PLAN OF CARE
Problem: Anemia  Goal: Anemia Symptom Improvement  Outcome: Ongoing, Progressing  Intervention: Monitor and Manage Anemia  Flowsheets (Taken 8/19/2022 5491)  Oral Nutrition Promotion:   rest periods promoted   medicated   nutritional therapy counseling provided  Safety Promotion/Fall Prevention:   in recliner, wheels locked   instructed to call staff for mobility   Fall Risk reviewed with patient/family  Fatigue Management:   fatigue-related activity identified   frequent rest breaks encouraged   paced activity encouraged

## 2022-08-22 ENCOUNTER — OFFICE VISIT (OUTPATIENT)
Dept: HEMATOLOGY/ONCOLOGY | Facility: CLINIC | Age: 72
End: 2022-08-22
Payer: MEDICARE

## 2022-08-22 ENCOUNTER — INFUSION (OUTPATIENT)
Dept: INFUSION THERAPY | Facility: HOSPITAL | Age: 72
End: 2022-08-22
Attending: INTERNAL MEDICINE
Payer: MEDICARE

## 2022-08-22 VITALS
HEART RATE: 76 BPM | DIASTOLIC BLOOD PRESSURE: 71 MMHG | HEIGHT: 66 IN | TEMPERATURE: 98 F | BODY MASS INDEX: 21.32 KG/M2 | SYSTOLIC BLOOD PRESSURE: 116 MMHG | RESPIRATION RATE: 18 BRPM | WEIGHT: 132.63 LBS | OXYGEN SATURATION: 99 %

## 2022-08-22 VITALS
DIASTOLIC BLOOD PRESSURE: 72 MMHG | TEMPERATURE: 99 F | HEART RATE: 92 BPM | HEIGHT: 66 IN | WEIGHT: 131.81 LBS | RESPIRATION RATE: 12 BRPM | BODY MASS INDEX: 21.18 KG/M2 | SYSTOLIC BLOOD PRESSURE: 118 MMHG | OXYGEN SATURATION: 99 %

## 2022-08-22 DIAGNOSIS — T45.1X5A CHEMOTHERAPY INDUCED DIARRHEA: ICD-10-CM

## 2022-08-22 DIAGNOSIS — T45.1X5A CHEMOTHERAPY-INDUCED NAUSEA AND VOMITING: ICD-10-CM

## 2022-08-22 DIAGNOSIS — R05.9 COUGH: ICD-10-CM

## 2022-08-22 DIAGNOSIS — C32.9 LARYNX CANCER: Primary | ICD-10-CM

## 2022-08-22 DIAGNOSIS — C01 PRIMARY CANCER OF BASE OF TONGUE: ICD-10-CM

## 2022-08-22 DIAGNOSIS — C01 PRIMARY CANCER OF BASE OF TONGUE: Primary | ICD-10-CM

## 2022-08-22 DIAGNOSIS — R11.2 CHEMOTHERAPY-INDUCED NAUSEA AND VOMITING: ICD-10-CM

## 2022-08-22 DIAGNOSIS — K52.1 CHEMOTHERAPY INDUCED DIARRHEA: ICD-10-CM

## 2022-08-22 PROCEDURE — 1126F PR PAIN SEVERITY QUANTIFIED, NO PAIN PRESENT: ICD-10-PCS | Mod: CPTII,S$GLB,, | Performed by: INTERNAL MEDICINE

## 2022-08-22 PROCEDURE — 4010F PR ACE/ARB THEARPY RXD/TAKEN: ICD-10-PCS | Mod: CPTII,S$GLB,, | Performed by: INTERNAL MEDICINE

## 2022-08-22 PROCEDURE — 3008F PR BODY MASS INDEX (BMI) DOCUMENTED: ICD-10-PCS | Mod: CPTII,S$GLB,, | Performed by: INTERNAL MEDICINE

## 2022-08-22 PROCEDURE — 96416 CHEMO PROLONG INFUSE W/PUMP: CPT

## 2022-08-22 PROCEDURE — 1126F AMNT PAIN NOTED NONE PRSNT: CPT | Mod: CPTII,S$GLB,, | Performed by: INTERNAL MEDICINE

## 2022-08-22 PROCEDURE — 3074F PR MOST RECENT SYSTOLIC BLOOD PRESSURE < 130 MM HG: ICD-10-PCS | Mod: CPTII,S$GLB,, | Performed by: INTERNAL MEDICINE

## 2022-08-22 PROCEDURE — 96417 CHEMO IV INFUS EACH ADDL SEQ: CPT

## 2022-08-22 PROCEDURE — 3074F SYST BP LT 130 MM HG: CPT | Mod: CPTII,S$GLB,, | Performed by: INTERNAL MEDICINE

## 2022-08-22 PROCEDURE — 1160F PR REVIEW ALL MEDS BY PRESCRIBER/CLIN PHARMACIST DOCUMENTED: ICD-10-PCS | Mod: CPTII,S$GLB,, | Performed by: INTERNAL MEDICINE

## 2022-08-22 PROCEDURE — 3044F PR MOST RECENT HEMOGLOBIN A1C LEVEL <7.0%: ICD-10-PCS | Mod: CPTII,S$GLB,, | Performed by: INTERNAL MEDICINE

## 2022-08-22 PROCEDURE — 99215 OFFICE O/P EST HI 40 MIN: CPT | Mod: S$GLB,,, | Performed by: INTERNAL MEDICINE

## 2022-08-22 PROCEDURE — 3288F PR FALLS RISK ASSESSMENT DOCUMENTED: ICD-10-PCS | Mod: CPTII,S$GLB,, | Performed by: INTERNAL MEDICINE

## 2022-08-22 PROCEDURE — 63600175 PHARM REV CODE 636 W HCPCS: Performed by: INTERNAL MEDICINE

## 2022-08-22 PROCEDURE — 96367 TX/PROPH/DG ADDL SEQ IV INF: CPT

## 2022-08-22 PROCEDURE — 96375 TX/PRO/DX INJ NEW DRUG ADDON: CPT

## 2022-08-22 PROCEDURE — 3044F HG A1C LEVEL LT 7.0%: CPT | Mod: CPTII,S$GLB,, | Performed by: INTERNAL MEDICINE

## 2022-08-22 PROCEDURE — 3078F DIAST BP <80 MM HG: CPT | Mod: CPTII,S$GLB,, | Performed by: INTERNAL MEDICINE

## 2022-08-22 PROCEDURE — 99999 PR PBB SHADOW E&M-EST. PATIENT-LVL V: ICD-10-PCS | Mod: PBBFAC,,, | Performed by: INTERNAL MEDICINE

## 2022-08-22 PROCEDURE — 1159F PR MEDICATION LIST DOCUMENTED IN MEDICAL RECORD: ICD-10-PCS | Mod: CPTII,S$GLB,, | Performed by: INTERNAL MEDICINE

## 2022-08-22 PROCEDURE — 3066F PR DOCUMENTATION OF TREATMENT FOR NEPHROPATHY: ICD-10-PCS | Mod: CPTII,S$GLB,, | Performed by: INTERNAL MEDICINE

## 2022-08-22 PROCEDURE — 3060F PR POS MICROALBUMINURIA RESULT DOCUMENTED/REVIEW: ICD-10-PCS | Mod: CPTII,S$GLB,, | Performed by: INTERNAL MEDICINE

## 2022-08-22 PROCEDURE — 1160F RVW MEDS BY RX/DR IN RCRD: CPT | Mod: CPTII,S$GLB,, | Performed by: INTERNAL MEDICINE

## 2022-08-22 PROCEDURE — 3066F NEPHROPATHY DOC TX: CPT | Mod: CPTII,S$GLB,, | Performed by: INTERNAL MEDICINE

## 2022-08-22 PROCEDURE — 3288F FALL RISK ASSESSMENT DOCD: CPT | Mod: CPTII,S$GLB,, | Performed by: INTERNAL MEDICINE

## 2022-08-22 PROCEDURE — 3060F POS MICROALBUMINURIA REV: CPT | Mod: CPTII,S$GLB,, | Performed by: INTERNAL MEDICINE

## 2022-08-22 PROCEDURE — 99999 PR PBB SHADOW E&M-EST. PATIENT-LVL V: CPT | Mod: PBBFAC,,, | Performed by: INTERNAL MEDICINE

## 2022-08-22 PROCEDURE — 3008F BODY MASS INDEX DOCD: CPT | Mod: CPTII,S$GLB,, | Performed by: INTERNAL MEDICINE

## 2022-08-22 PROCEDURE — 1101F PT FALLS ASSESS-DOCD LE1/YR: CPT | Mod: CPTII,S$GLB,, | Performed by: INTERNAL MEDICINE

## 2022-08-22 PROCEDURE — 3078F PR MOST RECENT DIASTOLIC BLOOD PRESSURE < 80 MM HG: ICD-10-PCS | Mod: CPTII,S$GLB,, | Performed by: INTERNAL MEDICINE

## 2022-08-22 PROCEDURE — 1159F MED LIST DOCD IN RCRD: CPT | Mod: CPTII,S$GLB,, | Performed by: INTERNAL MEDICINE

## 2022-08-22 PROCEDURE — 96413 CHEMO IV INFUSION 1 HR: CPT

## 2022-08-22 PROCEDURE — 4010F ACE/ARB THERAPY RXD/TAKEN: CPT | Mod: CPTII,S$GLB,, | Performed by: INTERNAL MEDICINE

## 2022-08-22 PROCEDURE — 25000003 PHARM REV CODE 250: Performed by: INTERNAL MEDICINE

## 2022-08-22 PROCEDURE — 1101F PR PT FALLS ASSESS DOC 0-1 FALLS W/OUT INJ PAST YR: ICD-10-PCS | Mod: CPTII,S$GLB,, | Performed by: INTERNAL MEDICINE

## 2022-08-22 PROCEDURE — 99215 PR OFFICE/OUTPT VISIT, EST, LEVL V, 40-54 MIN: ICD-10-PCS | Mod: S$GLB,,, | Performed by: INTERNAL MEDICINE

## 2022-08-22 RX ORDER — EPINEPHRINE 0.3 MG/.3ML
0.3 INJECTION SUBCUTANEOUS ONCE AS NEEDED
Status: CANCELLED | OUTPATIENT
Start: 2022-08-22

## 2022-08-22 RX ORDER — SODIUM CHLORIDE 0.9 % (FLUSH) 0.9 %
10 SYRINGE (ML) INJECTION
Status: DISCONTINUED | OUTPATIENT
Start: 2022-08-22 | End: 2022-08-22 | Stop reason: HOSPADM

## 2022-08-22 RX ORDER — EPINEPHRINE 0.3 MG/.3ML
0.3 INJECTION SUBCUTANEOUS ONCE AS NEEDED
Status: DISCONTINUED | OUTPATIENT
Start: 2022-08-22 | End: 2022-08-22 | Stop reason: HOSPADM

## 2022-08-22 RX ORDER — HEPARIN 100 UNIT/ML
500 SYRINGE INTRAVENOUS
Status: CANCELLED | OUTPATIENT
Start: 2022-08-26

## 2022-08-22 RX ORDER — DIPHENHYDRAMINE HYDROCHLORIDE 50 MG/ML
50 INJECTION INTRAMUSCULAR; INTRAVENOUS ONCE AS NEEDED
Status: CANCELLED | OUTPATIENT
Start: 2022-08-22

## 2022-08-22 RX ORDER — SODIUM CHLORIDE 0.9 % (FLUSH) 0.9 %
10 SYRINGE (ML) INJECTION
Status: CANCELLED | OUTPATIENT
Start: 2022-08-22

## 2022-08-22 RX ORDER — HEPARIN 100 UNIT/ML
500 SYRINGE INTRAVENOUS
Status: CANCELLED | OUTPATIENT
Start: 2022-08-22

## 2022-08-22 RX ORDER — DIPHENHYDRAMINE HYDROCHLORIDE 50 MG/ML
50 INJECTION INTRAMUSCULAR; INTRAVENOUS ONCE AS NEEDED
Status: DISCONTINUED | OUTPATIENT
Start: 2022-08-22 | End: 2022-08-22 | Stop reason: HOSPADM

## 2022-08-22 RX ORDER — CODEINE PHOSPHATE AND GUAIFENESIN 10; 100 MG/5ML; MG/5ML
5 SOLUTION ORAL 3 TIMES DAILY PRN
Qty: 354 ML | Refills: 0 | Status: SHIPPED | OUTPATIENT
Start: 2022-08-22 | End: 2023-06-20 | Stop reason: SDUPTHER

## 2022-08-22 RX ORDER — SODIUM CHLORIDE 0.9 % (FLUSH) 0.9 %
10 SYRINGE (ML) INJECTION
Status: CANCELLED | OUTPATIENT
Start: 2022-08-26

## 2022-08-22 RX ADMIN — SODIUM CHLORIDE 200 MG: 9 INJECTION, SOLUTION INTRAVENOUS at 11:08

## 2022-08-22 RX ADMIN — APREPITANT 130 MG: 130 INJECTION, EMULSION INTRAVENOUS at 12:08

## 2022-08-22 RX ADMIN — FLUOROURACIL 6920 MG: 50 INJECTION, SOLUTION INTRAVENOUS at 01:08

## 2022-08-22 RX ADMIN — DEXAMETHASONE SODIUM PHOSPHATE 0.25 MG: 4 INJECTION, SOLUTION INTRA-ARTICULAR; INTRALESIONAL; INTRAMUSCULAR; INTRAVENOUS; SOFT TISSUE at 12:08

## 2022-08-22 RX ADMIN — CARBOPLATIN 510 MG: 10 INJECTION, SOLUTION INTRAVENOUS at 12:08

## 2022-08-22 NOTE — PROGRESS NOTES
Service Date:  8/22/22    [facility]    Cyrus Batres Jr. is a 72 y.o. male referred here by Dr. Noel for laryngeal cancer.      Oncological history is as followed:  10/30/2020: completion of IMRT to larynx and bilateral necks via SiB totaling 6996 cGy.  Diagnosed as a T2 N0 M0 lesion at that time, stage II.   3/16/2021: SML left VF resection encompassing AC; margins negative on frozen, some margins positive on permanent  4/1/2021: SML KTP left transmuscular cordectomy, anterior right sublig resection encompassing AC; left TVF clear with negative margin;  right TVF frozen margins negative but positive on permanent  5/6/2021: SML KTP transmusc resection anterior right TVF; negative for carcinoma  1/6/2022: s/p Total laryngectomy; T4aN0 +PNI; no adjuvant radiation therapy or systemic therapy given    Now with malignancy at the base of the tongue [id number]. Negative mets on PET scan. Patient is not a candidate for further radiation therapy and does not want surgery as he would need a glossectomy.    Here for chemotherapy.  Diarrhea seems to be a major issue for him.  He is taking his Lomotil every 2 hours and still has about 2-3 loose stools per day, except for yesterday when he was okay.      Review of Systems   Constitutional: Negative.    HENT: Negative.    Eyes: Negative.    Respiratory: Negative.    Cardiovascular: Negative.    Gastrointestinal: Positive for diarrhea, nausea and vomiting.   Endocrine: Negative.    Genitourinary: Negative.    Musculoskeletal: Negative.    Integumentary:  Negative.   Neurological: Negative.    Hematological: Negative.    Psychiatric/Behavioral: Negative.         Current Outpatient Medications   Medication Instructions    acetaminophen (TYLENOL) 325 mg, Oral, Every 6 hours PRN    amoxicillin-clavulanate (AUGMENTIN) 200-28.5 mg/5 mL SusR 400 mg, Oral, Every 12 hours, Peg Tube    cefdinir (OMNICEF) 250 mg, Oral, Every 12 hours    dexAMETHasone (DECADRON) 8 mg,  Oral, Daily, Take as directed on days 2, 3, and 4 of your chemotherapy cycle.    diphenhydrAMINE (BENADRYL) 25 mg, Oral, Every 6 hours PRN    diphenoxylate-atropine 2.5-0.025 mg/5 ml (LOMOTIL) 2.5-0.025 mg/5 mL liquid 5 mLs, Oral, 4 times daily PRN    esomeprazole (NEXIUM) 40 mg, Oral, Before breakfast    guaiFENesin-codeine 100-10 mg/5 ml (TUSSI-ORGANIDIN NR)  mg/5 mL syrup 5 mLs, Per G Tube, 3 times daily PRN    levothyroxine (SYNTHROID) 100 mcg, Oral, Before breakfast    losartan (COZAAR) 100 MG tablet TAKE 1 TABLET BY MOUTH EVERY DAY    metFORMIN (GLUCOPHAGE) 500 mg, Oral, 2 times daily with meals    nut.tx.gluc intol,lf,soy-fiber (GLUCERNA 1.5 TRISHA) 0.08-1.5 gram-kcal/mL Liqd 5 Cans, PEG Tube, 5 times daily    OLANZapine (ZYPREXA) 5 mg, Oral, Nightly, Take as directed on days 1-4 of your chemotherapy cycle.   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    Postoperative hypothyroidism 7/7/2022        Past Surgical History:   Procedure Laterality Date    DIRECT LARYNGOBRONCHOSCOPY N/A 12/27/2021    Procedure: LARYNGOSCOPY, DIRECT, WITH BRONCHOSCOPY;  Surgeon: Flex Espinosa MD;  Location: [facility] OR [address] [place] [address];  Service: ENT;  Laterality: N/A;    DISSECTION OF NECK Bilateral 1/6/2022    Procedure: DISSECTION, NECK;  Surgeon: Jesse James MD;  Location: [facility] OR [address] [place] [address];  Service: ENT;  Laterality: Bilateral;    FLAP PROCEDURE Right 1/6/2022    Procedure: CREATION, FREE FLAP;  Surgeon: Alise Hart MD;  Location: [facility] OR [address] [place] [address];  Service: ENT;  Laterality: Right;  Ischemic start 1351  Ischemic stop 1502    INSERTION OF TUNNELED CENTRAL VENOUS CATHETER (CVC) WITH  SUBCUTANEOUS PORT N/A 6/9/2022    Procedure: [id number];  Surgeon: Jesus Viera MD;  Location: [facility] OR;  Service: General;  Laterality: N/A;    LARYNGECTOMY N/A 1/6/2022    Procedure: LARYNGECTOMY;  Surgeon: Jesse James MD;  Location: [facility] OR 2ND FLR;  Service: ENT;  Laterality: N/A;    LARYNGOSCOPY N/A 8/4/2020    Procedure: Suspension microlaryngoscopy with biopsy, possible KTP laser treatment/excision;  Surgeon: Stew Noel MD;  Location: [facility] OR [facility];  Service: ENT;  Laterality: N/A;  Microscope, telescopes, tower, microinstruments, KTP laser, rep conf# [id number] IC 7/28.    LARYNGOSCOPY N/A 3/16/2021    Procedure: Suspension microlaryngoscopy with excision of lesion, possible CO2 laser;  Surgeon: Stew Noel MD;  [facility];  Service: ENT;  Laterality: N/A;  Microscope, telescopes, tower, microinstruments, CO2 laser, rep conf# [id number] IC 3/4.    LARYNGOSCOPY N/A 4/1/2021    Procedure: Suspension microlaryngoscopy with KTP laser excision of lesion;  Surgeon: Stew Noel MD;  [facility];  Service: ENT;  Laterality: N/A;  Microscope, telescopes, tower, microinstruments, 70 degree scope, vocal fold , KTP laser, rep conf# [id number] BC    LARYNGOSCOPY N/A 12/9/2021    Procedure: Suspension microlaryngoscopy with biopsy;  Surgeon: Stew Noel MD;  Location: [facility] OR 2ND FLR;  Service: ENT;  Laterality: N/A;  Microscope, telescopes, tower, microinstruments    LARYNGOSCOPY N/A 1/6/2022    Procedure: LARYNGOSCOPY;  Surgeon: Jesse James MD;  [facility];  Service: ENT;  Laterality: N/A;    LARYNGOSCOPY N/A 4/27/2022    Procedure: LARYNGOSCOPY WITH BIOPSY;  Surgeon: Jesse James MD;  Location: [facility] OR 2ND FLR;  Service: ENT;  Laterality: N/A;    MICROLARYNGOSCOPY N/A 3/17/2020    Procedure: MICROLARYNGOSCOPY;  Surgeon: Jung Xiao MD;  Location: [facility] OR;  Service: ENT;  Laterality: N/A;  " Laser Microlaryngoscopy  NEED TO SCHEDULE LASER from [facility] [employer] [phone]    REIMPLANTATION OF PARATHYROID TISSUE N/A 1/6/2022    Procedure: REIMPLANTATION, PARATHYROID TISSUE;  Surgeon: Jesse James MD;  Location: [facility] OR 2ND FLR;  Service: ENT;  Laterality: N/A;    THYROIDECTOMY  1/6/2022    Procedure: THYROIDECTOMY;  Surgeon: Jesse James MD;  Location: [facility] OR 2ND FLR;  Service: ENT;;    TRACHEOSTOMY N/A 12/27/2021    Procedure: CREATION, TRACHEOSTOMY;  Surgeon: Flex Espinosa MD;  Location: [facility] OR 2ND FLR;  Service: ENT;  Laterality: N/A;        Family History   Problem Relation Age of Onset    Abnormal EKG Mother     Diabetes Father     Heart disease Father     Hypertension Father        Social History     Tobacco Use    Smoking status: Never Smoker    Smokeless tobacco: Never Used   Substance Use Topics    Alcohol use: Not Currently     Comment: occasional    Drug use: No         Vitals:    08/22/22 1010   BP: 118/72   Pulse: 92   Resp: 12   Temp: 98.6 °F (37 °C)        Physical Exam:  /72 (BP Location: Right arm, Patient Position: Sitting, BP Method: Medium (Automatic))   Pulse 92   Temp 98.6 °F (37 °C) (Temporal)   Resp 12   Ht 5' 6" (1.676 m)   Wt 59.8 kg (131 lb 13.4 oz)   SpO2 99%   BMI 21.28 kg/m²     Physical Exam  Vitals and nursing note reviewed.   Constitutional:       Appearance: Normal appearance.   HENT:      Head: Normocephalic and atraumatic.      Nose: Nose normal.      Mouth/Throat:      Mouth: Mucous membranes are moist.      Pharynx: Oropharynx is clear.   Eyes:      Extraocular Movements: Extraocular movements intact.      Conjunctiva/sclera: Conjunctivae normal.   Cardiovascular:      Rate and Rhythm: Normal rate and regular rhythm.      Heart sounds: Normal heart sounds.   Pulmonary:      Effort: Pulmonary effort is normal.      Breath sounds: Normal breath sounds.   Abdominal:      General: Abdomen is flat. Bowel sounds are normal.      " Palpations: Abdomen is soft.   Musculoskeletal:         General: Normal range of motion.      Cervical back: Normal range of motion and neck supple.   Skin:     General: Skin is warm and dry.   Neurological:      General: No focal deficit present.      Mental Status: He is alert and oriented to person, place, and time. Mental status is at baseline.   Psychiatric:         Mood and Affect: Mood normal.          Labs:  Lab Results   Component Value Date    WBC 6.58 08/19/2022    RBC 3.98 (L) 08/19/2022    HGB 9.8 (L) 08/19/2022    HCT 31.8 (L) 08/19/2022    MCV 80 (L) 08/19/2022    MCH 24.6 (L) 08/19/2022    MCHC 30.8 (L) 08/19/2022    RDW 22.5 (H) 08/19/2022     08/19/2022    MPV 9.9 08/19/2022    GRAN 57.0 08/19/2022    LYMPH 6.0 (L) 08/19/2022    MONO 6.0 08/19/2022    EOS CANCELED 07/26/2022    BASO CANCELED 07/26/2022    EOSINOPHIL 0.0 08/19/2022    BASOPHIL 0.0 08/19/2022     Sodium   Date Value Ref Range Status   08/19/2022 134 (L) 136 - 145 mmol/L Final     Potassium   Date Value Ref Range Status   08/19/2022 3.9 3.5 - 5.1 mmol/L Final     Chloride   Date Value Ref Range Status   08/19/2022 100 95 - 110 mmol/L Final     CO2   Date Value Ref Range Status   08/19/2022 29 23 - 29 mmol/L Final     Glucose   Date Value Ref Range Status   08/19/2022 170 (H) 70 - 110 mg/dL Final     BUN   Date Value Ref Range Status   08/19/2022 15 8 - 23 mg/dL Final     Creatinine   Date Value Ref Range Status   08/19/2022 0.8 0.5 - 1.4 mg/dL Final     Calcium   Date Value Ref Range Status   08/19/2022 8.7 8.7 - 10.5 mg/dL Final     Total Protein   Date Value Ref Range Status   08/19/2022 6.2 6.0 - 8.4 g/dL Final     Albumin   Date Value Ref Range Status   08/19/2022 2.9 (L) 3.5 - 5.2 g/dL Final   11/18/2020 3.7 3.6 - 5.1 g/dL Final     Comment:     For additional information, please refer to   http://education.[employer]/faq/LMQ630 (This link is   being provided for informational/ educational purposes only.)  This  test was developed and its analytical performance   characteristics have been determined by [facility]. It has not been cleared or approved by the   [place] Food and Drug Administration. This assay has been validated   pursuant to the CLIA regulations and is used for clinical   purposes.  @ Test Performed By:  [facility]  Yo Cortes M.D.,   [address] [place] [address]  CLIA  [id number]       Total Bilirubin   Date Value Ref Range Status   08/19/2022 0.5 0.1 - 1.0 mg/dL Final     Comment:     For infants and newborns, interpretation of results should be based  on gestational age, weight and in agreement with clinical  observations.    Premature Infant recommended reference ranges:  Up to 24 hours.............<8.0 mg/dL  Up to 48 hours............<12.0 mg/dL  3-5 days..................<15.0 mg/dL  6-29 days.................<15.0 mg/dL       Alkaline Phosphatase   Date Value Ref Range Status   08/19/2022 79 55 - 135 U/L Final     AST   Date Value Ref Range Status   08/19/2022 13 10 - 40 U/L Final     ALT   Date Value Ref Range Status   08/19/2022 14 10 - 44 U/L Final     Anion Gap   Date Value Ref Range Status   08/19/2022 5 (L) 8 - 16 mmol/L Final     eGFR if    Date Value Ref Range Status   07/29/2022 >60.0 >60 mL/min/1.73 m^2 Final     eGFR if non    Date Value Ref Range Status   07/29/2022 >60.0 >60 mL/min/1.73 m^2 Final     Comment:     Calculation used to obtain the estimated glomerular filtration  rate (eGFR) is the CKD-EPI equation.          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 followed by IMRT to larynx with subsequent persistent/recurrent disease refractory to stripping, ultimately requiring salvage laryngectomy 1/6/22 revealing a 4.2cm g1-2 SCCa with 1mm margin not followed by adjuvant treatment  -now with SCC  at left BOT  -patient has exceeding is allotment of radiation therapy to the area  -PET negative for distant disease  -proceed with cycle 3 of carbo, 5 FU, and Keytruda    Chemotherapy-induced nausea and vomiting  -on Zofran and dexamethasone     Chemotherapy-induced diarrhea   -told patient to take Imodium every 2 hours    Cough  -patient requesting cough suppressant as it is keeping up at night  -will give prescription for Robitussin with codeine    Aurash Khoobehi, MD  Hematology and Oncology

## 2022-08-22 NOTE — PLAN OF CARE
Problem: Fatigue  Goal: Improved Activity Tolerance  Outcome: Ongoing, Progressing  Intervention: Promote Improved Energy  Flowsheets (Taken 8/22/2022 2170)  Fatigue Management:   paced activity encouraged   fatigue-related activity identified   frequent rest breaks encouraged  Sleep/Rest Enhancement:   regular sleep/rest pattern promoted   reading promoted

## 2022-08-26 ENCOUNTER — INFUSION (OUTPATIENT)
Dept: INFUSION THERAPY | Facility: HOSPITAL | Age: 72
End: 2022-08-26
Attending: STUDENT IN AN ORGANIZED HEALTH CARE EDUCATION/TRAINING PROGRAM
Payer: MEDICARE

## 2022-08-26 VITALS
RESPIRATION RATE: 18 BRPM | HEART RATE: 90 BPM | SYSTOLIC BLOOD PRESSURE: 95 MMHG | BODY MASS INDEX: 22.04 KG/M2 | TEMPERATURE: 99 F | WEIGHT: 137.13 LBS | DIASTOLIC BLOOD PRESSURE: 65 MMHG | OXYGEN SATURATION: 98 % | HEIGHT: 66 IN

## 2022-08-26 DIAGNOSIS — D50.0 IRON DEFICIENCY ANEMIA DUE TO CHRONIC BLOOD LOSS: Primary | ICD-10-CM

## 2022-08-26 PROCEDURE — 25000003 PHARM REV CODE 250: Performed by: STUDENT IN AN ORGANIZED HEALTH CARE EDUCATION/TRAINING PROGRAM

## 2022-08-26 PROCEDURE — 63600175 PHARM REV CODE 636 W HCPCS: Performed by: STUDENT IN AN ORGANIZED HEALTH CARE EDUCATION/TRAINING PROGRAM

## 2022-08-26 PROCEDURE — A4216 STERILE WATER/SALINE, 10 ML: HCPCS | Performed by: STUDENT IN AN ORGANIZED HEALTH CARE EDUCATION/TRAINING PROGRAM

## 2022-08-26 PROCEDURE — 96365 THER/PROPH/DIAG IV INF INIT: CPT

## 2022-08-26 RX ORDER — SODIUM CHLORIDE 0.9 % (FLUSH) 0.9 %
10 SYRINGE (ML) INJECTION
Status: DISCONTINUED | OUTPATIENT
Start: 2022-08-26 | End: 2022-08-26 | Stop reason: HOSPADM

## 2022-08-26 RX ORDER — HEPARIN 100 UNIT/ML
500 SYRINGE INTRAVENOUS
Status: DISCONTINUED | OUTPATIENT
Start: 2022-08-26 | End: 2022-08-26 | Stop reason: HOSPADM

## 2022-08-26 RX ADMIN — SODIUM CHLORIDE, PRESERVATIVE FREE 10 ML: 5 INJECTION INTRAVENOUS at 03:08

## 2022-08-26 RX ADMIN — IRON SUCROSE: 20 INJECTION, SOLUTION INTRAVENOUS at 02:08

## 2022-08-26 RX ADMIN — HEPARIN 500 UNITS: 100 SYRINGE at 03:08

## 2022-08-26 NOTE — PLAN OF CARE
Problem: Fatigue  Goal: Improved Activity Tolerance  Intervention: Promote Improved Energy  Flowsheets (Taken 8/26/2022 1415)  Fatigue Management: frequent rest breaks encouraged  Activity Management: Ambulated -L4

## 2022-08-30 ENCOUNTER — PATIENT MESSAGE (OUTPATIENT)
Dept: HEMATOLOGY/ONCOLOGY | Facility: CLINIC | Age: 72
End: 2022-08-30
Payer: MEDICARE

## 2022-08-30 RX ORDER — SCOLOPAMINE TRANSDERMAL SYSTEM 1 MG/1
1 PATCH, EXTENDED RELEASE TRANSDERMAL
Qty: 10 PATCH | Refills: 0 | Status: SHIPPED | OUTPATIENT
Start: 2022-08-30 | End: 2022-09-01

## 2022-09-01 ENCOUNTER — HOSPITAL ENCOUNTER (INPATIENT)
Facility: HOSPITAL | Age: 72
LOS: 5 days | Discharge: HOME | DRG: 393 | End: 2022-09-06
Attending: EMERGENCY MEDICINE | Admitting: INTERNAL MEDICINE
Payer: MEDICARE

## 2022-09-01 ENCOUNTER — PATIENT MESSAGE (OUTPATIENT)
Dept: HEMATOLOGY/ONCOLOGY | Facility: CLINIC | Age: 72
End: 2022-09-01
Payer: MEDICARE

## 2022-09-01 DIAGNOSIS — D70.1 CHEMOTHERAPY-INDUCED NEUTROPENIA: ICD-10-CM

## 2022-09-01 DIAGNOSIS — R11.2 NAUSEA & VOMITING: ICD-10-CM

## 2022-09-01 DIAGNOSIS — K63.89 PNEUMATOSIS INTESTINALIS: Primary | ICD-10-CM

## 2022-09-01 DIAGNOSIS — T45.1X5A CHEMOTHERAPY-INDUCED NEUTROPENIA: ICD-10-CM

## 2022-09-01 DIAGNOSIS — R11.2 NAUSEA AND VOMITING, INTRACTABILITY OF VOMITING NOT SPECIFIED, UNSPECIFIED VOMITING TYPE: ICD-10-CM

## 2022-09-01 DIAGNOSIS — T45.1X5A CHEMOTHERAPY-INDUCED NAUSEA: ICD-10-CM

## 2022-09-01 DIAGNOSIS — R11.0 CHEMOTHERAPY-INDUCED NAUSEA: ICD-10-CM

## 2022-09-01 LAB
ABO + RH BLD: NORMAL
ALBUMIN SERPL BCP-MCNC: 3 G/DL (ref 3.5–5.2)
ALP SERPL-CCNC: 60 U/L (ref 55–135)
ALT SERPL W/O P-5'-P-CCNC: 12 U/L (ref 10–44)
ANION GAP SERPL CALC-SCNC: 10 MMOL/L (ref 8–16)
AST SERPL-CCNC: 15 U/L (ref 10–40)
BASOPHILS # BLD AUTO: 0.02 K/UL (ref 0–0.2)
BASOPHILS NFR BLD: 0.9 % (ref 0–1.9)
BILIRUB SERPL-MCNC: 0.8 MG/DL (ref 0.1–1)
BLD GP AB SCN CELLS X3 SERPL QL: NORMAL
BUN SERPL-MCNC: 35 MG/DL (ref 8–23)
CALCIUM SERPL-MCNC: 7.7 MG/DL (ref 8.7–10.5)
CHLORIDE SERPL-SCNC: 94 MMOL/L (ref 95–110)
CO2 SERPL-SCNC: 22 MMOL/L (ref 23–29)
CREAT SERPL-MCNC: 0.8 MG/DL (ref 0.5–1.4)
CRP SERPL-MCNC: 0.81 MG/DL
DIFFERENTIAL METHOD: ABNORMAL
EOSINOPHIL # BLD AUTO: 0 K/UL (ref 0–0.5)
EOSINOPHIL NFR BLD: 0 % (ref 0–8)
ERYTHROCYTE [DISTWIDTH] IN BLOOD BY AUTOMATED COUNT: 22.1 % (ref 11.5–14.5)
EST. GFR  (NO RACE VARIABLE): >60 ML/MIN/1.73 M^2
GLUCOSE SERPL-MCNC: 241 MG/DL (ref 70–110)
HCT VFR BLD AUTO: 31.6 % (ref 40–54)
HGB BLD-MCNC: 10.6 G/DL (ref 14–18)
IMM GRANULOCYTES # BLD AUTO: 0 K/UL (ref 0–0.04)
IMM GRANULOCYTES NFR BLD AUTO: 0 % (ref 0–0.5)
LACTATE SERPL-SCNC: 2.7 MMOL/L (ref 0.5–1.9)
LACTATE SERPL-SCNC: 3.1 MMOL/L (ref 0.5–1.9)
LDH SERPL L TO P-CCNC: 96 U/L (ref 110–260)
LIPASE SERPL-CCNC: 53 U/L (ref 4–60)
LYMPHOCYTES # BLD AUTO: 0.3 K/UL (ref 1–4.8)
LYMPHOCYTES NFR BLD: 12.3 % (ref 18–48)
MAGNESIUM SERPL-MCNC: 1.4 MG/DL (ref 1.6–2.6)
MCH RBC QN AUTO: 26.2 PG (ref 27–31)
MCHC RBC AUTO-ENTMCNC: 33.5 G/DL (ref 32–36)
MCV RBC AUTO: 78 FL (ref 82–98)
MONOCYTES # BLD AUTO: 0.3 K/UL (ref 0.3–1)
MONOCYTES NFR BLD: 13.7 % (ref 4–15)
NEUTROPHILS # BLD AUTO: 1.6 K/UL (ref 1.8–7.7)
NEUTROPHILS NFR BLD: 73.1 % (ref 38–73)
NRBC BLD-RTO: 0 /100 WBC
PHOSPHATE SERPL-MCNC: 2.6 MG/DL (ref 2.7–4.5)
PLATELET # BLD AUTO: 254 K/UL (ref 150–450)
PMV BLD AUTO: 10.2 FL (ref 9.2–12.9)
POTASSIUM SERPL-SCNC: 3.5 MMOL/L (ref 3.5–5.1)
PROT SERPL-MCNC: 5.3 G/DL (ref 6–8.4)
RBC # BLD AUTO: 4.04 M/UL (ref 4.6–6.2)
SARS-COV-2 RDRP RESP QL NAA+PROBE: NEGATIVE
SODIUM SERPL-SCNC: 126 MMOL/L (ref 136–145)
T4 FREE SERPL-MCNC: 0.57 NG/DL (ref 0.71–1.51)
TSH SERPL DL<=0.005 MIU/L-ACNC: 36.86 UIU/ML (ref 0.34–5.6)
URATE SERPL-MCNC: 3.8 MG/DL (ref 3.4–7)
WBC # BLD AUTO: 2.12 K/UL (ref 3.9–12.7)

## 2022-09-01 PROCEDURE — 96375 TX/PRO/DX INJ NEW DRUG ADDON: CPT

## 2022-09-01 PROCEDURE — 85025 COMPLETE CBC W/AUTO DIFF WBC: CPT | Performed by: NURSE PRACTITIONER

## 2022-09-01 PROCEDURE — 80053 COMPREHEN METABOLIC PANEL: CPT | Performed by: NURSE PRACTITIONER

## 2022-09-01 PROCEDURE — 63600175 PHARM REV CODE 636 W HCPCS: Performed by: STUDENT IN AN ORGANIZED HEALTH CARE EDUCATION/TRAINING PROGRAM

## 2022-09-01 PROCEDURE — 12000002 HC ACUTE/MED SURGE SEMI-PRIVATE ROOM

## 2022-09-01 PROCEDURE — 96365 THER/PROPH/DIAG IV INF INIT: CPT

## 2022-09-01 PROCEDURE — 99223 1ST HOSP IP/OBS HIGH 75: CPT | Mod: ,,, | Performed by: SURGERY

## 2022-09-01 PROCEDURE — 87040 BLOOD CULTURE FOR BACTERIA: CPT | Performed by: STUDENT IN AN ORGANIZED HEALTH CARE EDUCATION/TRAINING PROGRAM

## 2022-09-01 PROCEDURE — 93010 ELECTROCARDIOGRAM REPORT: CPT | Mod: ,,, | Performed by: INTERNAL MEDICINE

## 2022-09-01 PROCEDURE — 83605 ASSAY OF LACTIC ACID: CPT | Mod: 91 | Performed by: STUDENT IN AN ORGANIZED HEALTH CARE EDUCATION/TRAINING PROGRAM

## 2022-09-01 PROCEDURE — 84100 ASSAY OF PHOSPHORUS: CPT | Performed by: NURSE PRACTITIONER

## 2022-09-01 PROCEDURE — 83615 LACTATE (LD) (LDH) ENZYME: CPT | Performed by: STUDENT IN AN ORGANIZED HEALTH CARE EDUCATION/TRAINING PROGRAM

## 2022-09-01 PROCEDURE — 99285 EMERGENCY DEPT VISIT HI MDM: CPT | Mod: 25

## 2022-09-01 PROCEDURE — 25500020 PHARM REV CODE 255: Performed by: EMERGENCY MEDICINE

## 2022-09-01 PROCEDURE — 99223 PR INITIAL HOSPITAL CARE,LEVL III: ICD-10-PCS | Mod: ,,, | Performed by: SURGERY

## 2022-09-01 PROCEDURE — 83690 ASSAY OF LIPASE: CPT | Performed by: STUDENT IN AN ORGANIZED HEALTH CARE EDUCATION/TRAINING PROGRAM

## 2022-09-01 PROCEDURE — 25000003 PHARM REV CODE 250: Performed by: STUDENT IN AN ORGANIZED HEALTH CARE EDUCATION/TRAINING PROGRAM

## 2022-09-01 PROCEDURE — 84443 ASSAY THYROID STIM HORMONE: CPT | Performed by: STUDENT IN AN ORGANIZED HEALTH CARE EDUCATION/TRAINING PROGRAM

## 2022-09-01 PROCEDURE — U0002 COVID-19 LAB TEST NON-CDC: HCPCS | Performed by: STUDENT IN AN ORGANIZED HEALTH CARE EDUCATION/TRAINING PROGRAM

## 2022-09-01 PROCEDURE — 83735 ASSAY OF MAGNESIUM: CPT | Performed by: NURSE PRACTITIONER

## 2022-09-01 PROCEDURE — 93010 EKG 12-LEAD: ICD-10-PCS | Mod: ,,, | Performed by: INTERNAL MEDICINE

## 2022-09-01 PROCEDURE — 84145 PROCALCITONIN (PCT): CPT | Performed by: SURGERY

## 2022-09-01 PROCEDURE — 86140 C-REACTIVE PROTEIN: CPT | Performed by: SURGERY

## 2022-09-01 PROCEDURE — 93005 ELECTROCARDIOGRAM TRACING: CPT | Performed by: INTERNAL MEDICINE

## 2022-09-01 PROCEDURE — 84439 ASSAY OF FREE THYROXINE: CPT | Performed by: STUDENT IN AN ORGANIZED HEALTH CARE EDUCATION/TRAINING PROGRAM

## 2022-09-01 PROCEDURE — 84550 ASSAY OF BLOOD/URIC ACID: CPT | Performed by: STUDENT IN AN ORGANIZED HEALTH CARE EDUCATION/TRAINING PROGRAM

## 2022-09-01 PROCEDURE — 63600175 PHARM REV CODE 636 W HCPCS: Performed by: INTERNAL MEDICINE

## 2022-09-01 PROCEDURE — 86901 BLOOD TYPING SEROLOGIC RH(D): CPT | Performed by: SURGERY

## 2022-09-01 PROCEDURE — 96361 HYDRATE IV INFUSION ADD-ON: CPT

## 2022-09-01 RX ORDER — ENOXAPARIN SODIUM 100 MG/ML
40 INJECTION SUBCUTANEOUS EVERY 24 HOURS
Status: DISCONTINUED | OUTPATIENT
Start: 2022-09-01 | End: 2022-09-06 | Stop reason: HOSPADM

## 2022-09-01 RX ORDER — LANOLIN ALCOHOL/MO/W.PET/CERES
800 CREAM (GRAM) TOPICAL
Status: DISCONTINUED | OUTPATIENT
Start: 2022-09-01 | End: 2022-09-06 | Stop reason: HOSPADM

## 2022-09-01 RX ORDER — HYDRALAZINE HYDROCHLORIDE 20 MG/ML
10 INJECTION INTRAMUSCULAR; INTRAVENOUS EVERY 6 HOURS PRN
Status: DISCONTINUED | OUTPATIENT
Start: 2022-09-01 | End: 2022-09-06 | Stop reason: HOSPADM

## 2022-09-01 RX ORDER — MAGNESIUM SULFATE 1 G/100ML
1 INJECTION INTRAVENOUS
Status: DISCONTINUED | OUTPATIENT
Start: 2022-09-01 | End: 2022-09-06 | Stop reason: HOSPADM

## 2022-09-01 RX ORDER — SODIUM CHLORIDE, SODIUM LACTATE, POTASSIUM CHLORIDE, CALCIUM CHLORIDE 600; 310; 30; 20 MG/100ML; MG/100ML; MG/100ML; MG/100ML
INJECTION, SOLUTION INTRAVENOUS CONTINUOUS
Status: DISCONTINUED | OUTPATIENT
Start: 2022-09-01 | End: 2022-09-03

## 2022-09-01 RX ORDER — MAGNESIUM SULFATE HEPTAHYDRATE 40 MG/ML
2 INJECTION, SOLUTION INTRAVENOUS
Status: DISCONTINUED | OUTPATIENT
Start: 2022-09-01 | End: 2022-09-06 | Stop reason: HOSPADM

## 2022-09-01 RX ORDER — ONDANSETRON 2 MG/ML
4 INJECTION INTRAMUSCULAR; INTRAVENOUS EVERY 8 HOURS PRN
Status: DISCONTINUED | OUTPATIENT
Start: 2022-09-01 | End: 2022-09-06 | Stop reason: HOSPADM

## 2022-09-01 RX ORDER — PANTOPRAZOLE SODIUM 40 MG/10ML
40 INJECTION, POWDER, LYOPHILIZED, FOR SOLUTION INTRAVENOUS DAILY
Status: DISCONTINUED | OUTPATIENT
Start: 2022-09-02 | End: 2022-09-06 | Stop reason: HOSPADM

## 2022-09-01 RX ORDER — POTASSIUM CHLORIDE 20 MEQ/1
40 TABLET, EXTENDED RELEASE ORAL
Status: DISCONTINUED | OUTPATIENT
Start: 2022-09-01 | End: 2022-09-06 | Stop reason: HOSPADM

## 2022-09-01 RX ORDER — INSULIN ASPART 100 [IU]/ML
1-12 INJECTION, SOLUTION INTRAVENOUS; SUBCUTANEOUS
Status: DISCONTINUED | OUTPATIENT
Start: 2022-09-01 | End: 2022-09-01

## 2022-09-01 RX ORDER — LIDOCAINE AND PRILOCAINE 25; 25 MG/G; MG/G
CREAM TOPICAL ONCE
Status: COMPLETED | OUTPATIENT
Start: 2022-09-02 | End: 2022-09-02

## 2022-09-01 RX ORDER — MAGNESIUM SULFATE HEPTAHYDRATE 40 MG/ML
4 INJECTION, SOLUTION INTRAVENOUS
Status: DISCONTINUED | OUTPATIENT
Start: 2022-09-01 | End: 2022-09-06 | Stop reason: HOSPADM

## 2022-09-01 RX ORDER — LEVOTHYROXINE SODIUM 20 UG/ML
50 INJECTION, SOLUTION INTRAVENOUS DAILY
Status: DISCONTINUED | OUTPATIENT
Start: 2022-09-02 | End: 2022-09-02

## 2022-09-01 RX ORDER — INSULIN ASPART 100 [IU]/ML
1-6 INJECTION, SOLUTION INTRAVENOUS; SUBCUTANEOUS
Status: DISCONTINUED | OUTPATIENT
Start: 2022-09-01 | End: 2022-09-06 | Stop reason: HOSPADM

## 2022-09-01 RX ORDER — TALC
6 POWDER (GRAM) TOPICAL NIGHTLY PRN
Status: DISCONTINUED | OUTPATIENT
Start: 2022-09-01 | End: 2022-09-06 | Stop reason: HOSPADM

## 2022-09-01 RX ORDER — POTASSIUM CHLORIDE 20 MEQ/1
20 TABLET, EXTENDED RELEASE ORAL
Status: DISCONTINUED | OUTPATIENT
Start: 2022-09-01 | End: 2022-09-06 | Stop reason: HOSPADM

## 2022-09-01 RX ORDER — ONDANSETRON 2 MG/ML
4 INJECTION INTRAMUSCULAR; INTRAVENOUS
Status: COMPLETED | OUTPATIENT
Start: 2022-09-01 | End: 2022-09-01

## 2022-09-01 RX ORDER — MORPHINE SULFATE 2 MG/ML
2 INJECTION, SOLUTION INTRAMUSCULAR; INTRAVENOUS EVERY 4 HOURS PRN
Status: DISCONTINUED | OUTPATIENT
Start: 2022-09-01 | End: 2022-09-06 | Stop reason: HOSPADM

## 2022-09-01 RX ADMIN — ONDANSETRON 4 MG: 2 INJECTION INTRAMUSCULAR; INTRAVENOUS at 04:09

## 2022-09-01 RX ADMIN — IOHEXOL 100 ML: 350 INJECTION, SOLUTION INTRAVENOUS at 05:09

## 2022-09-01 RX ADMIN — ENOXAPARIN SODIUM 40 MG: 40 INJECTION SUBCUTANEOUS at 10:09

## 2022-09-01 RX ADMIN — SODIUM CHLORIDE, SODIUM LACTATE, POTASSIUM CHLORIDE, AND CALCIUM CHLORIDE: .6; .31; .03; .02 INJECTION, SOLUTION INTRAVENOUS at 10:09

## 2022-09-01 RX ADMIN — PIPERACILLIN AND TAZOBACTAM 4.5 G: 4; .5 INJECTION, POWDER, LYOPHILIZED, FOR SOLUTION INTRAVENOUS; PARENTERAL at 06:09

## 2022-09-01 RX ADMIN — SODIUM CHLORIDE, SODIUM LACTATE, POTASSIUM CHLORIDE, AND CALCIUM CHLORIDE 1000 ML: .6; .31; .03; .02 INJECTION, SOLUTION INTRAVENOUS at 04:09

## 2022-09-01 RX ADMIN — SODIUM CHLORIDE 1000 ML: 0.9 INJECTION, SOLUTION INTRAVENOUS at 06:09

## 2022-09-01 NOTE — ED PROVIDER NOTES
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 intake, diarrhea, and abdominal discomfort.  Patient decided come to the emergency room today because over past 24 hours he has been unable to tolerate p.o. with increased nausea and vomiting.  Patient's wife takes care of him and gives him glucerna x 5 but over the past week he has only been able to tolerate 1. Patient has not been able to take his medications secondary to nausea and vomiting. Patient denies chest pain, shortness of breath, palpitations, cough, fevers, chills, headaches, dizziness, visual changes, numbness or tingling, and, focal neural deficits.    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    Postoperative hypothyroidism 7/7/2022     Past Surgical History:   Procedure Laterality Date    DIRECT LARYNGOBRONCHOSCOPY N/A 12/27/2021    Procedure: LARYNGOSCOPY, DIRECT, WITH BRONCHOSCOPY;  Surgeon: Flex Espinosa MD;  Location: [facility] OR [address] [place] [address];  Service: ENT;  Laterality: N/A;    DISSECTION OF NECK Bilateral 1/6/2022    Procedure: DISSECTION, NECK;  Surgeon: Jesse James MD;  Location: [address];  Service: ENT;  Laterality: Bilateral;    FLAP PROCEDURE Right 1/6/2022    Procedure: CREATION, FREE FLAP;  Surgeon: Alise Hart MD;  Location: [facility] OR [address] [place] [address];  Service: ENT;  Laterality: Right;  Ischemic start 1351  Ischemic stop 1502    INSERTION OF TUNNELED CENTRAL VENOUS  CATHETER (CVC) WITH SUBCUTANEOUS PORT N/A 6/9/2022    Procedure: [id number];  Surgeon: Jesus Viera MD;  Location: [facility] OR;  Service: General;  Laterality: N/A;    LARYNGECTOMY N/A 1/6/2022    Procedure: LARYNGECTOMY;  Surgeon: Jesse James MD;  [facility];  Service: ENT;  Laterality: N/A;    LARYNGOSCOPY N/A 8/4/2020    Procedure: Suspension microlaryngoscopy with biopsy, possible KTP laser treatment/excision;  Surgeon: Stew Noel MD;  [facility];  Service: ENT;  Laterality: N/A;  Microscope, telescopes, tower, microinstruments, KTP laser, rep conf# [id number] IC 7/28.    LARYNGOSCOPY N/A 3/16/2021    Procedure: Suspension microlaryngoscopy with excision of lesion, possible CO2 laser;  Surgeon: Stew Noel MD;  [facility];  Service: ENT;  Laterality: N/A;  Microscope, telescopes, tower, microinstruments, CO2 laser, rep conf# [id number] IC 3/4.    LARYNGOSCOPY N/A 4/1/2021    Procedure: Suspension microlaryngoscopy with KTP laser excision of lesion;  Surgeon: Stew Noel MD;  [facility];  Service: ENT;  Laterality: N/A;  Microscope, telescopes, tower, microinstruments, 70 degree scope, vocal fold , KTP laser, rep conf# [id number] BC    LARYNGOSCOPY N/A 12/9/2021    Procedure: Suspension microlaryngoscopy with biopsy;  Surgeon: Stew Noel MD;  [facility];  Service: ENT;  Laterality: N/A;  Microscope, telescopes, tower, microinstruments    LARYNGOSCOPY N/A 1/6/2022    Procedure: LARYNGOSCOPY;  Surgeon: Jesse James MD;  [facility];  Service: ENT;  Laterality: N/A;    LARYNGOSCOPY N/A 4/27/2022    Procedure: LARYNGOSCOPY WITH BIOPSY;  Surgeon: Jesse James MD;  Location: [facility] OR [address] [place] [address];  Service: ENT;  Laterality: N/A;    MICROLARYNGOSCOPY N/A 3/17/2020    Procedure: MICROLARYNGOSCOPY;  Surgeon: Jung Xiao MD;  Location: [facility] OR;  Service: ENT;   Laterality: N/A;  Laser Microlaryngoscopy  NEED TO SCHEDULE LASER from [facility] [id number]    REIMPLANTATION OF PARATHYROID TISSUE N/A 1/6/2022    Procedure: REIMPLANTATION, PARATHYROID TISSUE;  Surgeon: Jesse James MD;  [facility];  Service: ENT;  Laterality: N/A;    THYROIDECTOMY  1/6/2022    Procedure: THYROIDECTOMY;  Surgeon: Jesse James MD;  [facility];  Service: ENT;;    TRACHEOSTOMY N/A 12/27/2021    Procedure: CREATION, TRACHEOSTOMY;  Surgeon: Flex Espinosa MD;  [facility];  Service: ENT;  Laterality: N/A;     Family History   Problem Relation Age of Onset    Abnormal EKG Mother     Diabetes Father     Heart disease Father     Hypertension Father      Social History     Tobacco Use    Smoking status: Never    Smokeless tobacco: Never   Substance Use Topics    Alcohol use: Not Currently     Comment: occasional    Drug use: No     Review of Systems   Constitutional:  Positive for appetite change. Negative for chills, diaphoresis and fever.   HENT:  Negative for congestion, ear pain, rhinorrhea, sore throat and trouble swallowing.    Eyes:  Negative for pain, discharge and visual disturbance.   Respiratory:  Negative for cough, shortness of breath and wheezing.    Cardiovascular:  Negative for chest pain and palpitations.   Gastrointestinal:  Positive for abdominal distention, abdominal pain, diarrhea, nausea and vomiting. Negative for constipation.   Genitourinary:  Negative for difficulty urinating, dysuria and hematuria.   Musculoskeletal:  Negative for back pain and neck pain.   Skin:  Negative for rash and wound.   Neurological:  Negative for dizziness, syncope, speech difficulty, weakness, light-headedness and numbness.   Hematological:  Does not bruise/bleed easily.   Psychiatric/Behavioral:  Negative for agitation, behavioral problems and confusion.      Physical Exam     Initial Vitals [09/01/22 1540]   BP Pulse Resp Temp SpO2    125/70 88 18 97.6 °F (36.4 °C) 100 %      MAP       --         Physical Exam    Nursing note and vitals reviewed.  Constitutional: No distress.   Cachectic appearing   HENT:   Head: Normocephalic and atraumatic.   Eyes: Conjunctivae and EOM are normal. Pupils are equal, round, and reactive to light.   Neck: Neck supple.   Laryngeal Stoma in place C/d/i   Normal range of motion.  Cardiovascular:  Normal rate, regular rhythm, intact distal pulses and normal pulses.     Exam reveals no distant heart sounds and no friction rub.       Pulmonary/Chest: Effort normal and breath sounds normal. No accessory muscle usage. No respiratory distress. He has no decreased breath sounds. He has no wheezes.   Abdominal: Abdomen is soft. He exhibits distension. There is generalized abdominal tenderness.   PEG tube in place, c/d/i There is no guarding.   Musculoskeletal:         General: Normal range of motion.      Cervical back: Normal range of motion and neck supple.      Right lower leg: Normal.      Left lower leg: Normal.     Neurological: He is alert and oriented to person, place, and time. GCS score is 15. GCS eye subscore is 4. GCS verbal subscore is 5. GCS motor subscore is 6.   Skin: Skin is warm and dry. Capillary refill takes less than 2 seconds.   Psychiatric: He has a normal mood and affect. His behavior is normal.       ED Course   Procedures  Labs Reviewed   CBC W/ AUTO DIFFERENTIAL - Abnormal; Notable for the following components:       Result Value    WBC 2.12 (*)     RBC 4.04 (*)     Hemoglobin 10.6 (*)     Hematocrit 31.6 (*)     MCV 78 (*)     MCH 26.2 (*)     RDW 22.1 (*)     Gran # (ANC) 1.6 (*)     Lymph # 0.3 (*)     Gran % 73.1 (*)     Lymph % 12.3 (*)     All other components within normal limits   COMPREHENSIVE METABOLIC PANEL - Abnormal; Notable for the following components:    Sodium 126 (*)     Chloride 94 (*)     CO2 22 (*)     Glucose 241 (*)     BUN 35 (*)     Calcium 7.7 (*)     Total Protein 5.3  (*)     Albumin 3.0 (*)     All other components within normal limits   MAGNESIUM - Abnormal; Notable for the following components:    Magnesium 1.4 (*)     All other components within normal limits   PHOSPHORUS - Abnormal; Notable for the following components:    Phosphorus 2.6 (*)     All other components within normal limits   TSH - Abnormal; Notable for the following components:    TSH 36.860 (*)     All other components within normal limits   LACTIC ACID, PLASMA - Abnormal; Notable for the following components:    Lactate (Lactic Acid) 3.1 (*)     All other components within normal limits    Narrative:     LA critical result(s) repeated. Called and verbal readback obtained   from Marcus Prado RN/ED by JBGretchen 09/01/2022 17:39   LACTATE DEHYDROGENASE - Abnormal; Notable for the following components:    LD 96 (*)     All other components within normal limits   T4, FREE - Abnormal; Notable for the following components:    Free T4 0.57 (*)     All other components within normal limits   CULTURE, BLOOD   CULTURE, BLOOD   CLOSTRIDIUM DIFFICILE   LIPASE   URIC ACID   SARS-COV-2 RNA AMPLIFICATION, QUAL   PROCALCITONIN   URINALYSIS, REFLEX TO URINE CULTURE   LACTIC ACID, PLASMA   C-REACTIVE PROTEIN   PROCALCITONIN   TYPE AND SCREEN PREOP     EKG Readings: (Independently Interpreted)   Rhythm: Normal Sinus Rhythm. Heart Rate: 87. Ectopy: No Ectopy. Conduction: Normal. ST Segments: Normal ST Segments. T Waves: Normal. Clinical Impression: Normal Sinus Rhythm     Imaging Results              CT Abdomen Pelvis With Contrast (Final result)  Result time 09/01/22 18:20:22      Final result by Nael Hui MD (09/01/22 18:20:22)                   Narrative:    CMS MANDATED QUALITY DATA - CT RADIATION - 436    All CT scans at this facility utilize dose modulation, iterative reconstruction, and/or weight based dosing when appropriate to reduce radiation dose to as low as reasonably achievable.        Reason: abdominal pain  partial history of laryngeal carcinoma    TECHNIQUE: CT abdomen and pelvis with 100 mL Omnipaque 350.    COMPARISON: PET/CT 5/13/2022    CT ABDOMEN:  Coronary artery calcification and extremely tiny hiatal hernia incidentally noted. Visualized lung bases are clear.    Liver, gallbladder, pancreas, spleen, adrenals, and kidneys are normal. Mild aortoiliac calcifications present.    Gastrostomy tube tip lies in distal gastric body. Small intestines are unremarkable. Mild wall thickening affects the ascending colon with pneumatosis intestinalis diffusely affecting the ascending colon. No other free intraperitoneal gas or, and remainder of colon is normal. A normal appendix is present.    The major mesenteric vascular structures are patent.    No acute osseous abnormality.    CT PELVIS:  Prostate is slightly enlarged. Bladder is normal. No free pelvic fluid. Tiny right and small to moderate left fat-containing inguinal hernias are present. No acute osseous abnormality.    IMPRESSION:    1. Pneumatosis intestinalis affecting the ascending colon with associated mild wall thickening. Etiology of this is undetermined. Potential considerations include intestinal ischemia or pneumatosis related to chemotherapy. Clinical and laboratory correlation is needed to assess significance. No portal venous gas or free intraperitoneal air however.  2. Coronary artery calcifications    Electronically signed by:  Nael Hui MD  9/1/2022 6:20 PM CDT Workstation: [id number]                                     X-Ray Chest PA And Lateral (Final result)  Result time 09/01/22 17:57:07      Final result by Nael Hui MD (09/01/22 17:57:07)                   Narrative:    Reason: VOMITING, DIARRHEA, WEAKNESS X 1 WEEK. ACTIVE CHEMO.    FINDINGS:  PA and lateral chest compared with 7/26/2022 show normal cardiomediastinal silhouette. Left subclavian port catheter unchanged.    Lungs are clear. Pulmonary vasculature is normal. No acute osseous  abnormality. Mild convex right thoracic spine curvature unchanged. Surgical clips again noted throughout the neck.    IMPRESSION:  No acute cardiopulmonary abnormality.    Electronically signed by:  Nael Hui MD  9/1/2022 5:57 PM CDT Workstation: [id number]                                     X-Ray Abdomen Flat And Erect (Final result)  Result time 09/01/22 17:59:26      Final result by Nael Hui MD (09/01/22 17:59:26)                   Narrative:    Reason: VOMITING, DIARRHEA, WEAKNESS X 1 WEEK. ACTIVE CHEMO.    FINDINGS:  Upright and supine abdomen show clear partially visualized lung bases. Negative for pneumoperitoneum.    Bowel gas pattern is nonobstructive. No intra-abdominal mass effect or organomegaly. Presumed gastrostomy tube projects of the abdomen. Tract-like foci of gas in right lateral abdomen occur in expected location of the ascending colon raising possibility of pneumatosis.    No acute osseous abnormality.    IMPRESSION:  Possible pneumatosis intestinalis in right abdomen affecting what may represent the ascending colon. Etiology of this is undetermined. Further evaluation with CT is recommended.    Electronically signed by:  Nael Hui MD  9/1/2022 5:59 PM CDT Workstation: [id number]                                     Medications   Amino acid 4.25% - dextrose 5% (CLINIMIX) solution (has no administration in time range)   lactated ringers bolus 1,000 mL (0 mLs Intravenous Stopped 9/1/22 1749)   ondansetron injection 4 mg (4 mg Intravenous Given 9/1/22 1648)   iohexoL (OMNIPAQUE 350) injection 100 mL (100 mLs Intravenous Given 9/1/22 1746)   piperacillin-tazobactam 4.5 g in dextrose 5 % 100 mL IVPB (ready to mix system) (4.5 g Intravenous New Bag 9/1/22 1839)   sodium chloride 0.9% bolus 1,000 mL (1,000 mLs Intravenous New Bag 9/1/22 1840)     Medical Decision Making:   Clinical Tests:   Lab Tests: Ordered and Reviewed  Radiological Study: Ordered and Reviewed  Sepsis Perfusion  "Assessment: "I attest a sepsis perfusion exam was performed within 6 hours of sepsis, severe sepsis, or septic shock presentation, following fluid resuscitation."    Sepsis Perfusion Assessment Complete: 9/1/2022 6:28 PM    ED Management:  89-year-old male with a past medical history of AFib, diabetes, hypertension, and 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 intake, diarrhea, and abdominal discomfort.  My differential diagnosis includes typhlitis, mesenteric ischemia, obstruction, appendicitis, pancreatitis, cholecystitis, peptic ulcer disease,diverticulitis, colitis, volvulus, hernia, UTI, gastritis and gastroenteritis. Abdominal exam without peritoneal signs. No evidence of acute abdomen at this time. Well appearing. Low suspicion for acute hepatobiliary disease, acute pancreatitis, PUD (including perforation), acute infectious processes (pneumonia, hepatitis, pyelonephritis), acute appendicitis, vascular catastrophe, bowel obstruction or viscus perforation.  Patient's labs notable for sodium 126, chloride 94, glucose 241, protein 5.3, albumin 3.0, TSH 36, T4 0.57, and lactic acid 3.1.  Patient's EKG was normal sinus rhythm, normal axis, normal intervals, no active ischemic changes.  Patient's imaging studies notable for pneumatosis intestinalis affecting the ascending colon with associated mild wall thickening. Etiology of this is undetermined. Potential considerations include intestinal ischemia or pneumatosis related to chemotherapy.  Patient's workup consistent with typhlitis vs intestinal ischemia. versus Patient was initiated on broad-spectrum antibiotics and given IV fluids 30 cc/kg.  General surgery was consulted for evaluation.  Hospital Medicine was consulted will be admitting patient for further management.    Javon Gates D.O.  [facility], PGY-4  9/1/2022 8:34 PM    Other:   I have discussed this case with another health care provider.         "   ED Course as of 09/01/22 2040   Thu Sep 01, 2022   1734 PROTEIN TOTAL(!): 5.3 [AW]   1735 Sodium(!): 126 [AW]   1735 Chloride(!): 94 [AW]   1735 CO2(!): 22 [AW]   1735 Glucose(!): 241 [AW]   1735 PROTEIN TOTAL(!): 5.3 [AW]   1735 Albumin(!): 3.0 [AW]   1751 Lactate, Charlie(!!): 3.1 [AW]   1833 CT Abdomen Pelvis With Contrast  1. Pneumatosis intestinalis affecting the ascending colon with associated mild wall thickening. Etiology of this is undetermined. Potential considerations include intestinal ischemia or pneumatosis related to chemotherapy. Clinical and laboratory correlation is needed to assess significance. No portal venous gas or free intraperitoneal air however.  2. Coronary artery calcifications [AW]   2035 TSH(!): 36.860 [AW]   2035 Free T4(!): 0.57 [AW]      ED Course User Index  [AW] Javon Gates MD             Clinical Impression:   Final diagnoses:  [R11.2] Nausea & vomiting  [K63.89] Pneumatosis intestinalis (Primary)      ED Disposition Condition    Admit                 Javon Gates MD  Resident  09/01/22 2040

## 2022-09-02 LAB
ALBUMIN SERPL BCP-MCNC: 2.5 G/DL (ref 3.5–5.2)
ALP SERPL-CCNC: 48 U/L (ref 55–135)
ALT SERPL W/O P-5'-P-CCNC: 11 U/L (ref 10–44)
ANION GAP SERPL CALC-SCNC: 7 MMOL/L (ref 8–16)
ANISOCYTOSIS BLD QL SMEAR: ABNORMAL
AST SERPL-CCNC: 13 U/L (ref 10–40)
BASOPHILS # BLD AUTO: ABNORMAL K/UL (ref 0–0.2)
BASOPHILS NFR BLD: 1 % (ref 0–1.9)
BILIRUB SERPL-MCNC: 0.7 MG/DL (ref 0.1–1)
BUN SERPL-MCNC: 23 MG/DL (ref 8–23)
CALCIUM SERPL-MCNC: 7.2 MG/DL (ref 8.7–10.5)
CHLORIDE SERPL-SCNC: 101 MMOL/L (ref 95–110)
CO2 SERPL-SCNC: 23 MMOL/L (ref 23–29)
CREAT SERPL-MCNC: 0.6 MG/DL (ref 0.5–1.4)
CRP SERPL-MCNC: 1.74 MG/DL
DACRYOCYTES BLD QL SMEAR: ABNORMAL
DIFFERENTIAL METHOD: ABNORMAL
EOSINOPHIL # BLD AUTO: ABNORMAL K/UL (ref 0–0.5)
EOSINOPHIL NFR BLD: 0 % (ref 0–8)
ERYTHROCYTE [DISTWIDTH] IN BLOOD BY AUTOMATED COUNT: 22 % (ref 11.5–14.5)
EST. GFR  (NO RACE VARIABLE): >60 ML/MIN/1.73 M^2
GLUCOSE SERPL-MCNC: 109 MG/DL (ref 70–110)
GLUCOSE SERPL-MCNC: 157 MG/DL (ref 70–110)
GLUCOSE SERPL-MCNC: 167 MG/DL (ref 70–110)
GLUCOSE SERPL-MCNC: 181 MG/DL (ref 70–110)
GLUCOSE SERPL-MCNC: 187 MG/DL (ref 70–110)
GLUCOSE SERPL-MCNC: 201 MG/DL (ref 70–110)
HCT VFR BLD AUTO: 26.2 % (ref 40–54)
HGB BLD-MCNC: 8.7 G/DL (ref 14–18)
HYPOCHROMIA BLD QL SMEAR: ABNORMAL
IMM GRANULOCYTES # BLD AUTO: ABNORMAL K/UL (ref 0–0.04)
IMM GRANULOCYTES NFR BLD AUTO: ABNORMAL % (ref 0–0.5)
LACTATE SERPL-SCNC: 1.2 MMOL/L (ref 0.5–1.9)
LYMPHOCYTES # BLD AUTO: ABNORMAL K/UL (ref 1–4.8)
LYMPHOCYTES NFR BLD: 19 % (ref 18–48)
MCH RBC QN AUTO: 26.2 PG (ref 27–31)
MCHC RBC AUTO-ENTMCNC: 33.2 G/DL (ref 32–36)
MCV RBC AUTO: 79 FL (ref 82–98)
MONOCYTES # BLD AUTO: ABNORMAL K/UL (ref 0.3–1)
MONOCYTES NFR BLD: 9 % (ref 4–15)
NEUTROPHILS NFR BLD: 41 % (ref 38–73)
NEUTS BAND NFR BLD MANUAL: 30 %
NRBC BLD-RTO: 0 /100 WBC
OVALOCYTES BLD QL SMEAR: ABNORMAL
PLATELET # BLD AUTO: 184 K/UL (ref 150–450)
PLATELET BLD QL SMEAR: ABNORMAL
PMV BLD AUTO: 9.8 FL (ref 9.2–12.9)
POIKILOCYTOSIS BLD QL SMEAR: SLIGHT
POLYCHROMASIA BLD QL SMEAR: ABNORMAL
POTASSIUM SERPL-SCNC: 3.2 MMOL/L (ref 3.5–5.1)
PROCALCITONIN SERPL IA-MCNC: 0.12 NG/ML (ref 0–0.5)
PROT SERPL-MCNC: 4.5 G/DL (ref 6–8.4)
RBC # BLD AUTO: 3.32 M/UL (ref 4.6–6.2)
SODIUM SERPL-SCNC: 131 MMOL/L (ref 136–145)
WBC # BLD AUTO: 1.73 K/UL (ref 3.9–12.7)

## 2022-09-02 PROCEDURE — C9113 INJ PANTOPRAZOLE SODIUM, VIA: HCPCS | Performed by: INTERNAL MEDICINE

## 2022-09-02 PROCEDURE — 99232 SBSQ HOSP IP/OBS MODERATE 35: CPT | Mod: ,,, | Performed by: SURGERY

## 2022-09-02 PROCEDURE — 85027 COMPLETE CBC AUTOMATED: CPT | Performed by: INTERNAL MEDICINE

## 2022-09-02 PROCEDURE — 25000003 PHARM REV CODE 250: Performed by: INTERNAL MEDICINE

## 2022-09-02 PROCEDURE — 80053 COMPREHEN METABOLIC PANEL: CPT | Performed by: INTERNAL MEDICINE

## 2022-09-02 PROCEDURE — 99232 PR SUBSEQUENT HOSPITAL CARE,LEVL II: ICD-10-PCS | Mod: ,,, | Performed by: SURGERY

## 2022-09-02 PROCEDURE — 99900031 HC PATIENT EDUCATION (STAT)

## 2022-09-02 PROCEDURE — 63600175 PHARM REV CODE 636 W HCPCS: Performed by: INTERNAL MEDICINE

## 2022-09-02 PROCEDURE — 85007 BL SMEAR W/DIFF WBC COUNT: CPT | Performed by: INTERNAL MEDICINE

## 2022-09-02 PROCEDURE — 99900035 HC TECH TIME PER 15 MIN (STAT)

## 2022-09-02 PROCEDURE — 83605 ASSAY OF LACTIC ACID: CPT | Performed by: SURGERY

## 2022-09-02 PROCEDURE — 12000002 HC ACUTE/MED SURGE SEMI-PRIVATE ROOM

## 2022-09-02 PROCEDURE — 99900026 HC AIRWAY MAINTENANCE (STAT)

## 2022-09-02 PROCEDURE — 94761 N-INVAS EAR/PLS OXIMETRY MLT: CPT

## 2022-09-02 PROCEDURE — 86140 C-REACTIVE PROTEIN: CPT | Performed by: SURGERY

## 2022-09-02 RX ORDER — SCOLOPAMINE TRANSDERMAL SYSTEM 1 MG/1
1 PATCH, EXTENDED RELEASE TRANSDERMAL
Status: DISCONTINUED | OUTPATIENT
Start: 2022-09-03 | End: 2022-09-06 | Stop reason: HOSPADM

## 2022-09-02 RX ORDER — LEVOTHYROXINE SODIUM 20 UG/ML
50 INJECTION, SOLUTION INTRAVENOUS DAILY
Status: DISCONTINUED | OUTPATIENT
Start: 2022-09-02 | End: 2022-09-02

## 2022-09-02 RX ORDER — LEVOTHYROXINE SODIUM ANHYDROUS 100 UG/5ML
50 INJECTION, POWDER, LYOPHILIZED, FOR SOLUTION INTRAVENOUS DAILY
Status: DISCONTINUED | OUTPATIENT
Start: 2022-09-02 | End: 2022-09-05

## 2022-09-02 RX ADMIN — LEVOTHYROXINE SODIUM ANHYDROUS 50 MCG: 100 INJECTION, POWDER, LYOPHILIZED, FOR SOLUTION INTRAVENOUS at 10:09

## 2022-09-02 RX ADMIN — LEUCINE, PHENYLALANINE, LYSINE, METHIONINE, ISOLEUCINE, VALINE, HISTIDINE, THREONINE, TRYPTOPHAN, ALANINE, GLYCINE, ARGININE, PROLINE, SERINE, TYROSINE, DEXTROSE 2000 ML: 311; 238; 247; 170; 255; 247; 204; 179; 77; 880; 438; 489; 289; 213; 17; 5 INJECTION INTRAVENOUS at 02:09

## 2022-09-02 RX ADMIN — LIDOCAINE AND PRILOCAINE: 25; 25 CREAM TOPICAL at 12:09

## 2022-09-02 RX ADMIN — LEUCINE, PHENYLALANINE, LYSINE, METHIONINE, ISOLEUCINE, VALINE, HISTIDINE, THREONINE, TRYPTOPHAN, ALANINE, GLYCINE, ARGININE, PROLINE, SERINE, TYROSINE, DEXTROSE 2000 ML: 311; 238; 247; 170; 255; 247; 204; 179; 77; 880; 438; 489; 289; 213; 17; 5 INJECTION INTRAVENOUS at 03:09

## 2022-09-02 RX ADMIN — PIPERACILLIN SODIUM AND TAZOBACTAM SODIUM 3.38 G: 3; .375 INJECTION, POWDER, LYOPHILIZED, FOR SOLUTION INTRAVENOUS at 08:09

## 2022-09-02 RX ADMIN — SODIUM CHLORIDE, SODIUM LACTATE, POTASSIUM CHLORIDE, AND CALCIUM CHLORIDE: .6; .31; .03; .02 INJECTION, SOLUTION INTRAVENOUS at 10:09

## 2022-09-02 RX ADMIN — PIPERACILLIN SODIUM AND TAZOBACTAM SODIUM 3.38 G: 3; .375 INJECTION, POWDER, LYOPHILIZED, FOR SOLUTION INTRAVENOUS at 10:09

## 2022-09-02 RX ADMIN — PANTOPRAZOLE SODIUM 40 MG: 40 INJECTION, POWDER, FOR SOLUTION INTRAVENOUS at 10:09

## 2022-09-02 RX ADMIN — ENOXAPARIN SODIUM 40 MG: 40 INJECTION SUBCUTANEOUS at 06:09

## 2022-09-02 RX ADMIN — PIPERACILLIN SODIUM AND TAZOBACTAM SODIUM 3.38 G: 3; .375 INJECTION, POWDER, LYOPHILIZED, FOR SOLUTION INTRAVENOUS at 03:09

## 2022-09-02 NOTE — SUBJECTIVE & OBJECTIVE
Interval History:     Review of Systems  As per subjective   Objective:     Vital Signs (Most Recent):  Temp: 98.1 °F (36.7 °C) (09/02/22 1137)  Pulse: 82 (09/02/22 1323)  Resp: 16 (09/02/22 1323)  BP: 122/70 (09/02/22 1137)  SpO2: 100 % (09/02/22 1323) Vital Signs (24h Range):  Temp:  [97.6 °F (36.4 °C)-98.9 °F (37.2 °C)] 98.1 °F (36.7 °C)  Pulse:  [80-94] 82  Resp:  [14-18] 16  SpO2:  [99 %-100 %] 100 %  BP: (103-125)/(65-82) 122/70     Weight: 60.9 kg (134 lb 4.2 oz)  Body mass index is 21.67 kg/m².    Intake/Output Summary (Last 24 hours) at 9/2/2022 1351  Last data filed at 9/2/2022 1035  Gross per 24 hour   Intake 1569.58 ml   Output 1400 ml   Net 169.58 ml      Physical Exam  Constitutional:       General: He is not in acute distress.     Appearance: He is well-developed.   HENT:      Head: Normocephalic.   Eyes:      Pupils: Pupils are equal, round, and reactive to light.   Cardiovascular:      Rate and Rhythm: Normal rate and regular rhythm.   Pulmonary:      Effort: No respiratory distress.   Abdominal:      General: Abdomen is flat. There is no distension.      Tenderness: There is no abdominal tenderness.   Musculoskeletal:      Cervical back: Neck supple.   Skin:     Findings: No rash.   Neurological:      Mental Status: He is alert and oriented to person, place, and time.       Significant Labs: All pertinent labs within the past 24 hours have been reviewed.  BMP:   Recent Labs   Lab 09/01/22  1638 09/02/22  0600   * 157*   * 131*   K 3.5 3.2*   CL 94* 101   CO2 22* 23   BUN 35* 23   CREATININE 0.8 0.6   CALCIUM 7.7* 7.2*   MG 1.4*  --        Significant Imaging: I have reviewed all pertinent imaging results/findings within the past 24 hours.

## 2022-09-02 NOTE — PLAN OF CARE
Problem: Feeding Intolerance (Enteral Nutrition)  Goal: Feeding Tolerance  Intervention: Prevent and Manage Feeding Intolerance  Flowsheets (Taken 9/2/2022 6785)  Nutrition Support Management: tube feeding initiated

## 2022-09-02 NOTE — HPI
72 year old male with history of Laryngeal Ca, A fib, DM 2. HTN, Post-op Hypothyroidism, and has had 2/3 COVID vaccinations presented to ED complaining of generalized abdominal pain, nausea and vomiting, and diarrhea for the past 7-10 day worsening over the paswt 2 days. Pain: generalized 8/10 waxing and waning. Vomitus: anything he puts in PEG. Diarrhea: brown, no blood or black.     In ED: labs reviewed and noted below: minimal leukopenia with mild normocytic anemia (normal platelets); mild hyponatremia with normal renal function and prerenal azotemia; hepatic function is normal; lactate is elevated; markedly elevated TSH with below normal T4. CT Abdo: Pneumatosis intestinalis. CXR reviewed: NAPD. EKG reviewed: sinus with no acute segments.    Discussed with ED MD: Surgeon involved; will change medication to parental including l-thyroxine; start Clinamix currently until TPN can be initiated; C dif toxin review; AM labs for review; hydration for lactic acidosis; piperacillin

## 2022-09-02 NOTE — PROGRESS NOTES
[facility]  Progress Note    Patient Name: Cyrus Batres Jr.  MRN: [id number]  Patient Class: IP- Inpatient   Admission Date: 9/1/2022  Length of Stay: 1 days  Attending Physician: Theron Mar MD  Primary Care Provider: Maico Patterson MD        Subjective:     Principal Problem:Pneumatosis intestinalis        HPI:  72 year old male with history of Laryngeal Ca, A fib, DM 2. HTN, Post-op Hypothyroidism, and has had 2/3 COVID vaccinations presented to ED complaining of generalized abdominal pain, nausea and vomiting, and diarrhea for the past 7-10 day worsening over the paswt 2 days. Pain: generalized 8/10 waxing and waning. Vomitus: anything he puts in PEG. Diarrhea: brown, no blood or black.     In ED: labs reviewed and noted below: minimal leukopenia with mild normocytic anemia (normal platelets); mild hyponatremia with normal renal function and prerenal azotemia; hepatic function is normal; lactate is elevated; markedly elevated TSH with below normal T4. CT Abdo: Pneumatosis intestinalis. CXR reviewed: NAPD. EKG reviewed: sinus with no acute segments.    Discussed with ED MD: Surgeon involved; will change medication to parental including l-thyroxine; start Clinamix currently until TPN can be initiated; C dif toxin review; AM labs for review; hydration for lactic acidosis; piperacillin      Overview/Hospital Course:  Patient was seen and examined  No fever and no abdominal pain  Abdominal exam is benign.  ANC 1.6.  Lactic acid back to the normal.  TSH elevation noted  CT abdominal findings noted.  Surgeon review noted  On exam patient is totally benign abdominal        Interval History:     Review of Systems  As per subjective   Objective:     Vital Signs (Most Recent):  Temp: 98.1 °F (36.7 °C) (09/02/22 1137)  Pulse: 82 (09/02/22 1323)  Resp: 16 (09/02/22 1323)  BP: 122/70 (09/02/22 1137)  SpO2: 100 % (09/02/22 1323) Vital Signs (24h Range):  Temp:  [97.6 °F (36.4 °C)-98.9 °F (37.2  °C)] 98.1 °F (36.7 °C)  Pulse:  [80-94] 82  Resp:  [14-18] 16  SpO2:  [99 %-100 %] 100 %  BP: (103-125)/(65-82) 122/70     Weight: 60.9 kg (134 lb 4.2 oz)  Body mass index is 21.67 kg/m².    Intake/Output Summary (Last 24 hours) at 9/2/2022 1351  Last data filed at 9/2/2022 1035  Gross per 24 hour   Intake 1569.58 ml   Output 1400 ml   Net 169.58 ml      Physical Exam  Constitutional:       General: He is not in acute distress.     Appearance: He is well-developed.   HENT:      Head: Normocephalic.   Eyes:      Pupils: Pupils are equal, round, and reactive to light.   Cardiovascular:      Rate and Rhythm: Normal rate and regular rhythm.   Pulmonary:      Effort: No respiratory distress.   Abdominal:      General: Abdomen is flat. There is no distension.      Tenderness: There is no abdominal tenderness.   Musculoskeletal:      Cervical back: Neck supple.   Skin:     Findings: No rash.   Neurological:      Mental Status: He is alert and oriented to person, place, and time.       Significant Labs: All pertinent labs within the past 24 hours have been reviewed.  BMP:   Recent Labs   Lab 09/01/22  1638 09/02/22  0600   * 157*   * 131*   K 3.5 3.2*   CL 94* 101   CO2 22* 23   BUN 35* 23   CREATININE 0.8 0.6   CALCIUM 7.7* 7.2*   MG 1.4*  --        Significant Imaging: I have reviewed all pertinent imaging results/findings within the past 24 hours.      Assessment/Plan:      Active Hospital Problems    Diagnosis    *Pneumatosis intestinalis    Nausea and vomiting    Larynx cancer     9/16/20-10/30/20 radiation to larynx and bilateral necks  1/6/22 TL with bilateral neck dissection and total thyroidectomy      Type 2 diabetes mellitus with hyperglycemia    Type 2 diabetes mellitus with hyperglycemia, without long-term current use of insulin    Hyperlipidemia    Paroxysmal atrial fibrillation     Patient was recently hospitalized with bilateral pneumonia at [facility]. He was treated  accordingly with antibiotics and breathing treatment. He was also found to have atrial fibrillation and was started on Eliquis.      Type 2 diabetes mellitus with diabetic arthropathy, with long-term current use of insulin     Pt has diabetes in 2016 after he had suffered pneumonia in the hospital.         PLAN   Restart tube feeding this afternoon   Observation as per surgeon , may need repeat CT later   Hold chemo   Oncology review   consult nutrition   Monitor ANC. Currently OK   Zosyn   Patient was on dexa at home along with chemo , if BP low , will start IV   IV synthroid half dose       VTE Risk Mitigation (From admission, onward)         Ordered     enoxaparin injection 40 mg  Daily         09/01/22 2201     IP VTE HIGH RISK PATIENT  Once         09/01/22 2201     Place sequential compression device  Until discontinued         09/01/22 2201                Discharge Planning   CHIARA: 9/2/2022     Code Status: Full Code   Is the patient medically ready for discharge?:     Reason for patient still in hospital (select all that apply): Treatment  Discharge Plan A: Home with family                  Theron Mar MD  Department of Hospital Medicine   [facility]

## 2022-09-02 NOTE — PLAN OF CARE
[facility]  Initial Discharge Assessment       Primary Care Provider: Maico Patterson MD    Admission Diagnosis: Pneumatosis intestinalis [K63.89]    Admission Date: 9/1/2022  Expected Discharge Date: TBD      Discharge Barriers Identified: None    Payor: HU[employer] MANAGED MEDICARE / Plan: HU[employer] MEDICARE HMO / Product Type: Capitation /     Extended Emergency Contact Information  Primary Emergency Contact: Janel Batres  Address: [address] [place] [address] [place] [address] [place]  Mobile Phone: [phone]  Relation: Spouse  Preferred language: English   needed? No    Discharge Plan A: Home with family  Discharge Plan B: Home with family      [id number] - [place] - [address]  [address] [place] [address]  Phone: [phone] Fax: [phone]    [facility] ([facility]) - [place], [place] - [address]   [address] [facility] [address]  Phone: [phone] Fax: [phone]    [id number] - [place] - [address] AT [facility] [address]  [address]  [place] [address]  Phone: [phone] Fax: [phone]      Initial Assessment (most recent)       Adult Discharge Assessment - 09/02/22 1033          Discharge Assessment    Assessment Type Discharge Planning Assessment     Source of Information health record     Communicated CHIARA with patient/caregiver Date not available/Unable to determine     Reason For Admission Pneumatosis intestinalis     Lives With spouse     Facility Arrived From: home     Do you expect to return to your current living situation? Yes     Do you have help at home or someone to help you manage your care at home? Yes     Who are your caregiver(s) and their phone number(s)? BatresJanel (Spouse)   [phone] (Mobile)     Prior to hospitilization cognitive status: Alert/Oriented     Current cognitive status:  Alert/Oriented     Walking or Climbing Stairs Difficulty none     Dressing/Bathing Difficulty none     Home Accessibility wheelchair accessible     Equipment Currently Used at Home suction machine     Readmission within 30 days? No     Patient currently being followed by outpatient case management? No     Do you currently have service(s) that help you manage your care at home? No     Do you take prescription medications? Yes     Do you have prescription coverage? Yes     Do you have any problems affording any of your prescribed medications? No     Is the patient taking medications as prescribed? yes     Who is going to help you get home at discharge? Janel Batres (Spouse)   [phone] (Mobile)     How do you get to doctors appointments? car, drives self;family or friend will provide     Are you on dialysis? No     Do you take coumadin? No     Discharge Plan A Home with family     Discharge Plan B Home with family     DME Needed Upon Discharge  none     Discharge Barriers Identified None        Relationship/Environment    Name(s) of Who Lives With Patient Janel Batres (Spouse)   [phone] (Mobile)

## 2022-09-02 NOTE — H&P
[facility] - Emergency Dept  Hospital Medicine  History & Physical    Patient Name: Cyrus Batres Jr.  MRN: [id number]  Patient Class: IP- Inpatient  Admission Date: 9/1/2022  Attending Physician: Evaristo Chappell MD  Primary Care Provider: Maico Patterson MD         Patient information was obtained from past medical records, ER records and ED MD.     Subjective:     Principal Problem:Pneumatosis intestinalis    Chief Complaint:   Chief Complaint   Patient presents with    Vomiting    Diarrhea    Weakness     X 1 WEEK, ACTIVE CHEMO        HPI: 72 year old male with history of Laryngeal Ca, A fib, DM 2. HTN, Post-op Hypothyroidism, and has had 2/3 COVID vaccinations presented to ED complaining of generalized abdominal pain, nausea and vomiting, and diarrhea for the past 7-10 day worsening over the paswt 2 days. Pain: generalized 8/10 waxing and waning. Vomitus: anything he puts in PEG. Diarrhea: brown, no blood or black.     In ED: labs reviewed and noted below: minimal leukopenia with mild normocytic anemia (normal platelets); mild hyponatremia with normal renal function and prerenal azotemia; hepatic function is normal; lactate is elevated; markedly elevated TSH with below normal T4. CT Abdo: Pneumatosis intestinalis. CXR reviewed: NAPD. EKG reviewed: sinus with no acute segments.    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    Postoperative hypothyroidism 7/7/2022       Past Surgical History:   Procedure Laterality Date    DIRECT LARYNGOBRONCHOSCOPY N/A 12/27/2021    Procedure: LARYNGOSCOPY,  DIRECT, WITH BRONCHOSCOPY;  Surgeon: Flex Espinosa MD;  [facility];  Service: ENT;  Laterality: N/A;    DISSECTION OF NECK Bilateral 1/6/2022    Procedure: DISSECTION, NECK;  Surgeon: Jesse James MD;  [facility];  Service: ENT;  Laterality: Bilateral;    FLAP PROCEDURE Right 1/6/2022    Procedure: CREATION, FREE FLAP;  Surgeon: Alise Hart MD;  [facility];  Service: ENT;  Laterality: Right;  Ischemic start 1351  Ischemic stop 1502    INSERTION OF TUNNELED CENTRAL VENOUS CATHETER (CVC) WITH SUBCUTANEOUS PORT N/A 6/9/2022    Procedure: [id number];  Surgeon: Jesus Viera MD;  Location: [facility] OR;  Service: General;  Laterality: N/A;    LARYNGECTOMY N/A 1/6/2022    Procedure: LARYNGECTOMY;  Surgeon: Jesse James MD;  [facility];  Service: ENT;  Laterality: N/A;    LARYNGOSCOPY N/A 8/4/2020    Procedure: Suspension microlaryngoscopy with biopsy, possible KTP laser treatment/excision;  Surgeon: Stew Noel MD;  [facility];  Service: ENT;  Laterality: N/A;  Microscope, telescopes, tower, microinstruments, KTP laser, rep conf# [id number] IC 7/28.    LARYNGOSCOPY N/A 3/16/2021    Procedure: Suspension microlaryngoscopy with excision of lesion, possible CO2 laser;  Surgeon: Stew Noel MD;  [facility];  Service: ENT;  Laterality: N/A;  Microscope, telescopes, tower, microinstruments, CO2 laser, rep conf# [id number] IC 3/4.    LARYNGOSCOPY N/A 4/1/2021    Procedure: Suspension microlaryngoscopy with KTP laser excision of lesion;  Surgeon: Stew Noel MD;  [facility];  Service: ENT;  Laterality: N/A;  Microscope, telescopes, tower, microinstruments, 70 degree scope, vocal fold , KTP laser, rep conf# [id number] BC    LARYNGOSCOPY N/A 12/9/2021    Procedure: Suspension microlaryngoscopy with biopsy;  Surgeon: Stew Noel MD;  [facility];  Service: ENT;  Laterality: N/A;  Microscope, telescopes, tower, microinstruments    LARYNGOSCOPY N/A 1/6/2022    Procedure: LARYNGOSCOPY;  Surgeon: Jesse James MD;  Location: [facility] OR 2ND FLR;  Service: ENT;  Laterality: N/A;    LARYNGOSCOPY N/A 4/27/2022    Procedure: LARYNGOSCOPY WITH BIOPSY;  Surgeon: Jesse James MD;  Location: [facility] OR [facility];  Service: ENT;  Laterality: N/A;    MICROLARYNGOSCOPY N/A 3/17/2020    Procedure: MICROLARYNGOSCOPY;  Surgeon: Jung Xiao MD;  Location: [facility] OR;  Service: ENT;  Laterality: N/A;  Laser Microlaryngoscopy  NEED TO SCHEDULE LASER from [employer] [id number]    REIMPLANTATION OF PARATHYROID TISSUE N/A 1/6/2022    Procedure: REIMPLANTATION, PARATHYROID TISSUE;  Surgeon: Jesse James MD;  Location: [facility] OR [facility];  Service: ENT;  Laterality: N/A;    THYROIDECTOMY  1/6/2022    Procedure: THYROIDECTOMY;  Surgeon: Jesse James MD;  Location: [facility] OR [facility] FLR;  Service: ENT;;    TRACHEOSTOMY N/A 12/27/2021    Procedure: CREATION, TRACHEOSTOMY;  Surgeon: Flex Espinosa MD;  Location: [facility] OR [facility]LR;  Service: ENT;  Laterality: N/A;       Review of patient's allergies indicates:   Allergen Reactions    Pollen extracts     Lovastatin Rash     Not confirmed but pt skeptical       Current Facility-Administered Medications on File Prior to Encounter   Medication    lactated ringers infusion     Current Outpatient Medications on File Prior to Encounter   Medication Sig    dexAMETHasone (DECADRON) 4 MG Tab Take 2 tablets (8 mg total) by mouth once daily. Take as directed on days 2, 3, and 4 of your chemotherapy cycle.    esomeprazole (NEXIUM) 40 MG capsule 40 mg before breakfast.    guaiFENesin-codeine 100-10 mg/5 ml (TUSSI-ORGANIDIN NR)  mg/5 mL syrup 5 mLs by Per G Tube route 3 (three) times daily as needed for Cough.    levothyroxine (SYNTHROID) 100 MCG tablet Take 1 tablet (100 mcg total) by mouth before breakfast.     losartan (COZAAR) 100 MG tablet TAKE 1 TABLET BY MOUTH EVERY DAY (Patient taking differently: Take 100 mg by mouth every evening.)    metFORMIN (GLUCOPHAGE) 500 MG tablet Take 1 tablet (500 mg total) by mouth 2 (two) times daily with meals.    OLANZapine (ZYPREXA) 5 MG tablet Take 1 tablet (5 mg total) by mouth every evening. Take as directed on days 1-4 of your chemotherapy cycle.    traZODone (DESYREL) 50 MG tablet Take 1 tablet (50 mg total) by mouth nightly as needed for Insomnia.    acetaminophen (TYLENOL) 325 MG tablet Take 325 mg by mouth every 6 (six) hours as needed for Pain.    diphenhydrAMINE (BENADRYL) 25 mg capsule Take 25 mg by mouth every 6 (six) hours as needed for Itching.    nut.tx.gluc intol,lf,soy-fiber (GLUCERNA 1.5 TRISHA) 0.08-1.5 gram-kcal/mL Liqd 5 Cans by PEG Tube route 5 (five) times daily.    [DISCONTINUED] amoxicillin-clavulanate (AUGMENTIN) 200-28.5 mg/5 mL SusR Take 10 mLs (400 mg total) by mouth every 12 (twelve) hours. Peg Tube    [DISCONTINUED] aspirin (ECOTRIN) 81 MG EC tablet Take 81 mg by mouth Daily.    [DISCONTINUED] cefdinir (OMNICEF) 250 mg/5 mL suspension Take 5 mLs (250 mg total) by mouth every 12 (twelve) hours.    [DISCONTINUED] ondansetron (ZOFRAN-ODT) 8 MG TbDL Take 1 tablet (8 mg total) by mouth every 8 (eight) hours as needed (nausea/vomiting).    [DISCONTINUED] prednisone 5 mg/5 mL Soln Take 20 mLs (20 mg total) by mouth once daily. Via peg  tube    [DISCONTINUED] scopolamine (TRANSDERM-SCOP) 1.3-1.5 mg (1 mg over 3 days) Place 1 patch onto the skin every 72 hours.     Family History       Problem Relation (Age of Onset)    Abnormal EKG Mother    Diabetes Father    Heart disease Father    Hypertension Father          Tobacco Use    Smoking status: Never    Smokeless tobacco: Never   Substance and Sexual Activity    Alcohol use: Not Currently     Comment: occasional    Drug use: No    Sexual activity: Yes     Partners: Female     Review of Systems    Constitutional:  Positive for fatigue.   HENT: Negative.     Eyes: Negative.    Respiratory: Negative.     Cardiovascular: Negative.    Gastrointestinal:  Positive for abdominal distention, abdominal pain, diarrhea, nausea and vomiting.   Endocrine: Negative.    Genitourinary: Negative.    Musculoskeletal: Negative.    Skin: Negative.    Allergic/Immunologic: Negative.    Neurological: Negative.    Hematological: Negative.    All other systems reviewed and are negative.  Objective:     Vital Signs (Most Recent):  Temp: 97.6 °F (36.4 °C) (09/01/22 1540)  Pulse: 82 (09/01/22 1710)  Resp: 18 (09/01/22 1540)  BP: 103/65 (09/01/22 1710)  SpO2: 100 % (09/01/22 1710) Vital Signs (24h Range):  Temp:  [97.6 °F (36.4 °C)] 97.6 °F (36.4 °C)  Pulse:  [82-88] 82  Resp:  [18] 18  SpO2:  [100 %] 100 %  BP: (103-125)/(65-70) 103/65     Weight: 59 kg (130 lb)  Body mass index is 20.98 kg/m².    Physical Exam  Vitals and nursing note reviewed.   Constitutional:       Appearance: He is well-developed.      Comments: Cachetic    HENT:      Head: Normocephalic and atraumatic.      Right Ear: External ear normal.      Left Ear: External ear normal.      Nose: Nose normal.   Eyes:      Conjunctiva/sclera: Conjunctivae normal.      Pupils: Pupils are equal, round, and reactive to light.   Cardiovascular:      Rate and Rhythm: Normal rate and regular rhythm.      Heart sounds: Normal heart sounds.   Pulmonary:      Effort: Pulmonary effort is normal.      Breath sounds: Normal breath sounds.      Comments: Tracheostomy   Abdominal:      General: Bowel sounds are normal.      Palpations: Abdomen is soft.      Comments: PEG  Voluntary guarding without peritoneal   Musculoskeletal:         General: Normal range of motion.      Cervical back: Normal range of motion and neck supple.   Skin:     General: Skin is warm and dry.      Capillary Refill: Capillary refill takes less than 2 seconds.   Neurological:      Mental Status: He is alert and  oriented to person, place, and time.   Psychiatric:         Behavior: Behavior normal.         Thought Content: Thought content normal.         Judgment: Judgment normal.         CRANIAL NERVES     CN III, IV, VI   Pupils are equal, round, and reactive to light.     Significant Labs: All pertinent labs within the past 24 hours have been reviewed.  CBC:   Recent Labs   Lab 09/01/22  1638   WBC 2.12*   HGB 10.6*   HCT 31.6*        CMP:   Recent Labs   Lab 09/01/22  1638   *   K 3.5   CL 94*   CO2 22*   *   BUN 35*   CREATININE 0.8   CALCIUM 7.7*   PROT 5.3*   ALBUMIN 3.0*   BILITOT 0.8   ALKPHOS 60   AST 15   ALT 12   ANIONGAP 10     Cardiac Markers: No results for input(s): CKMB, MYOGLOBIN, BNP, TROPISTAT in the last 48 hours.  Lactic Acid:   Recent Labs   Lab 09/01/22  1638   LACTATE 3.1*     TSH:   Recent Labs   Lab 09/01/22  1638   TSH 36.860*       Significant Imaging: I have reviewed all pertinent imaging results/findings within the past 24 hours.    Assessment/Plan:     * Pneumatosis intestinalis  Surgeon involved; will change medication to parental including l-thyroxine; start Clinamix currently until TPN can be initiated; C dif toxin review; AM labs for review; hydration for lactic acidosis; piperacillin        VTE Risk Mitigation (From admission, onward)    None             Evaristo Chappell MD  Department of Hospital Medicine   [facility] - Emergency Dept

## 2022-09-02 NOTE — HOSPITAL COURSE
72-year-old male with previous history of laryngeal cancer status post PEG tube on tube feeding came in with abdominal pain, nausea, vomiting and mild diarrhea.  CT scan of the abdomen was done at admit with no other acute finding but found pneumatosis intestinalis and admitted and started on IV antibiotic.  Patient was seen by surgeon and no surgical intervention recommended.   C diff  was not done because of no more diarrhea during hospital stay.  He was not tolerant to Glucerna tube feeding about a month and his wife was giving plain yogurt through the tube.  It was changed to Jevity and patient tolerated well and arranged at discharge.  Repeat CT scan was done with improvement of the findings and patient has  always almost totally benign abdomen.  Patient developed acute pancytopenia secondary to recent chemotherapy and hematology ordered Neupogen and patient ANC back to more than 1000 and discharged stable condition.  Levaquin and Flagyl for total of 7 days via PEG tube was ordered.  Expected to have mild diarrhea from the tube feeding and given Creon and Lomotil p.r.n. at discharge.  Arranged outpatient tube feeding with case management and follow recommendation of Nutrition.

## 2022-09-02 NOTE — CONSULTS
GENERAL SURGERY  INPATIENT CONSULT    REASON FOR CONSULT: Pneumatosis intestinalis of the ascending colon    HPI: Cyrus Batres Jr. is a 72 y.o. 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 Also found to have some abdominal discomfort intolerance of p.o.. Initial laboratory evaluation showed neutropenia, hyponatremia and other mild electrolyte derangements. This also found to have lactic acid of 3.1.  Underwent CT scan of the abdomen and pelvis which showed pneumatosis of the ascending colon but without evidence of portal venous gas or free air. General surgery has been consulted for evaluation. On exam the patient denies any significant abdominal pain. He appears comfortable in bed and is able to sit up. He has received some fluid resuscitation. Reports no significant abdominal surgeries other than feeding tube which was utilized for all of his p.o. intake.    I have reviewed the patient's chart including prior progress notes, procedures and testing.     ROS:   Review of Systems    PROBLEM LIST:  Patient Active Problem List   Diagnosis    Melanocytic nevus    Body mass index (BMI) of 29.0-29.9 in adult    Benign hypertension    Overweight    Paroxysmal atrial fibrillation    Type 2 diabetes mellitus with diabetic arthropathy, with long-term current use of insulin    Fatty liver disease, nonalcoholic    False positive serological test for hepatitis C    Hyperlipidemia    Type 2 diabetes mellitus with hyperglycemia, without long-term current use of insulin    Gastroesophageal reflux disease without esophagitis    Type 2 diabetes mellitus with hyperglycemia    Vitamin B12 deficiency    Hyperuricemia    Hypovitaminosis D    Proteinuria    On enteral nutrition    Larynx cancer    Dehydration     NSVT (nonsustained ventricular tachycardia)    Hemoptysis    Adverse effect of adrenal cortical steroids, sequela    S/P laryngectomy    Hypocalcemia    Aphonia    Dysphagia, pharyngoesophageal    Acute pain of both shoulders    Bilateral arm weakness    Oral bleeding    Primary cancer of base of tongue    Advanced care planning/counseling discussion    Iron deficiency anemia due to chronic blood loss    Postoperative hypothyroidism    Pressure injury of sacral region, stage 1    Pneumatosis intestinalis         HISTORY  Past Medical History:   Diagnosis Date    Allergy     pollen extracts    Atrial fibrillation     Chronic anticoagulation     Diabetes mellitus, type 2     Hypertension     Larynx neoplasm malignant 8/4/2020    Postoperative hypothyroidism 7/7/2022       Past Surgical History:   Procedure Laterality Date    DIRECT LARYNGOBRONCHOSCOPY N/A 12/27/2021    Procedure: LARYNGOSCOPY, DIRECT, WITH BRONCHOSCOPY;  Surgeon: Flex Espinosa MD;  Location: [address];  Service: ENT;  Laterality: N/A;    DISSECTION OF NECK Bilateral 1/6/2022    Procedure: DISSECTION, NECK;  Surgeon: Jesse James MD;  Location: [address];  Service: ENT;  Laterality: Bilateral;    FLAP PROCEDURE Right 1/6/2022    Procedure: CREATION, FREE FLAP;  Surgeon: Alise Hart MD;  Location: [address];  Service: ENT;  Laterality: Right;  Ischemic start 1351  Ischemic stop 1502    INSERTION OF TUNNELED CENTRAL VENOUS CATHETER (CVC) WITH SUBCUTANEOUS PORT N/A 6/9/2022    Procedure: [id number];  Surgeon: Jesus Viera MD;  Location: [facility];  Service: General;  Laterality: N/A;    LARYNGECTOMY N/A 1/6/2022    Procedure: LARYNGECTOMY;  Surgeon: Jesse James MD;  Location: [address];  Service: ENT;  Laterality: N/A;    LARYNGOSCOPY N/A 8/4/2020    Procedure: Suspension microlaryngoscopy with biopsy, possible KTP laser treatment/excision;  Surgeon: Stew Noel MD;  Location: [facility]  2ND FLR;  Service: ENT;  Laterality: N/A;  Microscope, telescopes, tower, microinstruments, KTP laser, rep conf# [id number] IC 7/28.    LARYNGOSCOPY N/A 3/16/2021    Procedure: Suspension microlaryngoscopy with excision of lesion, possible CO2 laser;  Surgeon: Stew Noel MD;  [facility];  Service: ENT;  Laterality: N/A;  Microscope, telescopes, tower, microinstruments, CO2 laser, rep conf# [id number] IC 3/4.    LARYNGOSCOPY N/A 4/1/2021    Procedure: Suspension microlaryngoscopy with KTP laser excision of lesion;  Surgeon: Stew Noel MD;  [facility];  Service: ENT;  Laterality: N/A;  Microscope, telescopes, tower, microinstruments, 70 degree scope, vocal fold , KTP laser, rep conf# [id number] BC    LARYNGOSCOPY N/A 12/9/2021    Procedure: Suspension microlaryngoscopy with biopsy;  Surgeon: Stew Noel MD;  [facility];  Service: ENT;  Laterality: N/A;  Microscope, telescopes, tower, microinstruments    LARYNGOSCOPY N/A 1/6/2022    Procedure: LARYNGOSCOPY;  Surgeon: Jesse James MD;  [facility];  Service: ENT;  Laterality: N/A;    LARYNGOSCOPY N/A 4/27/2022    Procedure: LARYNGOSCOPY WITH BIOPSY;  Surgeon: Jesse James MD;  [facility];  Service: ENT;  Laterality: N/A;    MICROLARYNGOSCOPY N/A 3/17/2020    Procedure: MICROLARYNGOSCOPY;  Surgeon: Jung Xiao MD;  Location: [facility];  Service: ENT;  Laterality: N/A;  Laser Microlaryngoscopy  NEED TO SCHEDULE LASER from [employer] [id number]    REIMPLANTATION OF PARATHYROID TISSUE N/A 1/6/2022    Procedure: REIMPLANTATION, PARATHYROID TISSUE;  Surgeon: Jesse James MD;  [facility];  Service: ENT;  Laterality: N/A;    THYROIDECTOMY  1/6/2022    Procedure: THYROIDECTOMY;  Surgeon: Jesse James MD;  Location: [facility] [address] [place] [address];  Service: ENT;;    TRACHEOSTOMY N/A 12/27/2021    Procedure: CREATION, TRACHEOSTOMY;  Surgeon:  Flex Espinosa MD;  Location: [facility] OR [address] [place] [address];  Service: ENT;  Laterality: N/A;       Social History     Tobacco Use    Smoking status: Never    Smokeless tobacco: Never   Substance Use Topics    Alcohol use: Not Currently     Comment: occasional    Drug use: No       Family History   Problem Relation Age of Onset    Abnormal EKG Mother     Diabetes Father     Heart disease Father     Hypertension Father          MEDS:  Current Facility-Administered Medications on File Prior to Encounter   Medication Dose Route Frequency Provider Last Rate Last Admin    [DISCONTINUED] lactated ringers infusion   Intravenous Continuous Torsten Hilton MD         Current Outpatient Medications on File Prior to Encounter   Medication Sig Dispense Refill    dexAMETHasone (DECADRON) 4 MG Tab Take 2 tablets (8 mg total) by mouth once daily. Take as directed on days 2, 3, and 4 of your chemotherapy cycle. 24 tablet 2    esomeprazole (NEXIUM) 40 MG capsule 40 mg before breakfast.      guaiFENesin-codeine 100-10 mg/5 ml (TUSSI-ORGANIDIN NR)  mg/5 mL syrup 5 mLs by Per G Tube route 3 (three) times daily as needed for Cough. 354 mL 0    levothyroxine (SYNTHROID) 100 MCG tablet Take 1 tablet (100 mcg total) by mouth before breakfast. 30 tablet 11    losartan (COZAAR) 100 MG tablet TAKE 1 TABLET BY MOUTH EVERY DAY (Patient taking differently: Take 100 mg by mouth every evening.) 90 tablet 3    metFORMIN (GLUCOPHAGE) 500 MG tablet Take 1 tablet (500 mg total) by mouth 2 (two) times daily with meals. 60 tablet 3    OLANZapine (ZYPREXA) 5 MG tablet Take 1 tablet (5 mg total) by mouth every evening. Take as directed on days 1-4 of your chemotherapy cycle. 30 tablet 5    traZODone (DESYREL) 50 MG tablet Take 1 tablet (50 mg total) by mouth nightly as needed for Insomnia. 30 tablet 2    acetaminophen (TYLENOL) 325 MG tablet Take 325 mg by mouth every 6 (six) hours as needed for Pain.      diphenhydrAMINE (BENADRYL) 25 mg capsule Take 25 mg by  mouth every 6 (six) hours as needed for Itching.      nut.tx.gluc intol,lf,soy-fiber (GLUCERNA 1.5 TRISHA) 0.08-1.5 gram-kcal/mL Liqd 5 Cans by PEG Tube route 5 (five) times daily. 150 each 5    [DISCONTINUED] amoxicillin-clavulanate (AUGMENTIN) 200-28.5 mg/5 mL SusR Take 10 mLs (400 mg total) by mouth every 12 (twelve) hours. Peg Tube 100 mL 0    [DISCONTINUED] aspirin (ECOTRIN) 81 MG EC tablet Take 81 mg by mouth Daily.      [DISCONTINUED] cefdinir (OMNICEF) 250 mg/5 mL suspension Take 5 mLs (250 mg total) by mouth every 12 (twelve) hours. 70 mL 0    [DISCONTINUED] ondansetron (ZOFRAN-ODT) 8 MG TbDL Take 1 tablet (8 mg total) by mouth every 8 (eight) hours as needed (nausea/vomiting). 60 tablet 5    [DISCONTINUED] prednisone 5 mg/5 mL Soln Take 20 mLs (20 mg total) by mouth once daily. Via peg  tube 100 mL 0    [DISCONTINUED] scopolamine (TRANSDERM-SCOP) 1.3-1.5 mg (1 mg over 3 days) Place 1 patch onto the skin every 72 hours. 10 patch 0       ALLERGIES:  Review of patient's allergies indicates:   Allergen Reactions    Pollen extracts     Lovastatin Rash     Not confirmed but pt skeptical         VITALS:  Temp:  [97.6 °F (36.4 °C)-98.9 °F (37.2 °C)] 98.9 °F (37.2 °C)  Pulse:  [82-94] 94  Resp:  [18] 18  SpO2:  [99 %-100 %] 99 %  BP: (103-125)/(65-72) 121/72    No intake/output data recorded.      PHYSICAL EXAM:  Physical Exam      LABS:  Lab Results   Component Value Date    WBC 2.12 (L) 09/01/2022    RBC 4.04 (L) 09/01/2022    HGB 10.6 (L) 09/01/2022    HCT 31.6 (L) 09/01/2022     09/01/2022     Lab Results   Component Value Date     (H) 09/01/2022     (L) 09/01/2022    K 3.5 09/01/2022    CL 94 (L) 09/01/2022    CO2 22 (L) 09/01/2022    BUN 35 (H) 09/01/2022    CREATININE 0.8 09/01/2022    CALCIUM 7.7 (L) 09/01/2022     Lab Results   Component Value Date    ALT 12 09/01/2022    AST 15 09/01/2022    GGT 49 07/02/2019    ALKPHOS 60 09/01/2022    BILITOT 0.8 09/01/2022     Lab Results   Component  Value Date    MG 1.4 (L) 09/01/2022    PHOS 2.6 (L) 09/01/2022       STUDIES:  Images and reports were personally reviewed.    CT A/P  CT ABDOMEN:  Coronary artery calcification and extremely tiny hiatal hernia incidentally noted. Visualized lung bases are clear.     Liver, gallbladder, pancreas, spleen, adrenals, and kidneys are normal. Mild aortoiliac calcifications present.     Gastrostomy tube tip lies in distal gastric body. Small intestines are unremarkable. Mild wall thickening affects the ascending colon with pneumatosis intestinalis diffusely affecting the ascending colon. No other free intraperitoneal gas or, and remainder of colon is normal. A normal appendix is present.     The major mesenteric vascular structures are patent.     No acute osseous abnormality.     CT PELVIS:  Prostate is slightly enlarged. Bladder is normal. No free pelvic fluid. Tiny right and small to moderate left fat-containing inguinal hernias are present. No acute osseous abnormality.     IMPRESSION:     1. Pneumatosis intestinalis affecting the ascending colon with associated mild wall thickening. Etiology of this is undetermined. Potential considerations include intestinal ischemia or pneumatosis related to chemotherapy. Clinical and laboratory correlation is needed to assess significance. No portal venous gas or free intraperitoneal air however.  2. Coronary artery calcifications      ASSESSMENT & PLAN:  72 y.o. 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 currently on chemotherapy, neutropenia  -imaging findings are concerning however exam is completely benign and the patient is hemodynamically stable  -repeat lactic acid still elevated but down trending  -etiologies for pneumatosis include ischemia, infectious and chemotherapy induced causes  -at this time will defer surgery and observe with IV antibiotics, fluid resuscitation and monitoring of labs, if the  patient has worsening abdominal exam, worsening labs or any other concerning factors will have a short trigger to take him to the operating room at which time he will likely end up with a right hemicolectomy and end ileostomy  -continue Zosyn  -will trend CRP and lactic acid as well as abdominal exam  -oncology has been informed the patient's situation will evaluate the patient tomorrow    Please call with any questions or concerns.    Abdulaziz Le MD  General Surgery  [phone]

## 2022-09-02 NOTE — CARE UPDATE
09/02/22 0639   Patient Assessment/Suction   Level of Consciousness (AVPU) alert   Respiratory Effort Normal;Unlabored   Expansion/Accessory Muscles/Retractions no use of accessory muscles   All Lung Fields Breath Sounds   (few crackles noted lll)   Cough Frequency infrequent;with stimulation   Cough Type assisted   Suction Method tracheal   Suction Pressure (mmHg) -120 mmHg   $ Suction Charges Inline Suction Procedure Stat Charge   Secretions Amount moderate   Secretions Color white   Secretions Characteristics thick   PRE-TX-O2   O2 Device (Oxygen Therapy) room air   SpO2 99 %   Pulse Oximetry Type Intermittent   $ Pulse Oximetry - Multiple Charge Pulse Oximetry - Multiple   Pulse 87   Resp 14   Maintain airway patency, adequate oxygenation

## 2022-09-02 NOTE — ASSESSMENT & PLAN NOTE
Surgeon involved; will change medication to parental including l-thyroxine; start Clinamix currently until TPN can be initiated; C dif toxin review; AM labs for review; hydration for lactic acidosis; piperacillin

## 2022-09-02 NOTE — CARE UPDATE
09/02/22 1323   Patient Assessment/Suction   Level of Consciousness (AVPU) alert   Respiratory Effort Normal;Unlabored   Rhythm/Pattern, Respiratory pattern regular   PRE-TX-O2   O2 Device (Oxygen Therapy) room air   SpO2 100 %   Pulse Oximetry Type Intermittent   $ Pulse Oximetry - Multiple Charge Pulse Oximetry - Multiple   Pulse 82   Resp 16   Positioning Supine   Education   $ Education 15 min  (SATS/STOMA)   Respiratory Evaluation   $ Care Plan Tech Time 15 min   PT HAD PREVIOUS LARYNGECTOMY = SELF CARE FOR STOMA PER PT AND HIS WIFE. PT DID NOT NEED ASSISTANCE

## 2022-09-02 NOTE — SUBJECTIVE & OBJECTIVE
Past Medical History:   Diagnosis Date    Allergy     pollen extracts    Atrial fibrillation     Chronic anticoagulation     Diabetes mellitus, type 2     Hypertension     Larynx neoplasm malignant 8/4/2020    Postoperative hypothyroidism 7/7/2022       Past Surgical History:   Procedure Laterality Date    DIRECT LARYNGOBRONCHOSCOPY N/A 12/27/2021    Procedure: LARYNGOSCOPY, DIRECT, WITH BRONCHOSCOPY;  Surgeon: Flex Espinosa MD;  [facility];  Service: ENT;  Laterality: N/A;    DISSECTION OF NECK Bilateral 1/6/2022    Procedure: DISSECTION, NECK;  Surgeon: Jesse James MD;  [facility];  Service: ENT;  Laterality: Bilateral;    FLAP PROCEDURE Right 1/6/2022    Procedure: CREATION, FREE FLAP;  Surgeon: Alise Hart MD;  [facility];  Service: ENT;  Laterality: Right;  Ischemic start 1351  Ischemic stop 1502    INSERTION OF TUNNELED CENTRAL VENOUS CATHETER (CVC) WITH SUBCUTANEOUS PORT N/A 6/9/2022    Procedure: [id number];  Surgeon: Jesus Viera MD;  Location: [facility];  Service: General;  Laterality: N/A;    LARYNGECTOMY N/A 1/6/2022    Procedure: LARYNGECTOMY;  Surgeon: Jesse James MD;  Location: [address];  Service: ENT;  Laterality: N/A;    LARYNGOSCOPY N/A 8/4/2020    Procedure: Suspension microlaryngoscopy with biopsy, possible KTP laser treatment/excision;  Surgeon: Stew Noel MD;  Location: [address];  Service: ENT;  Laterality: N/A;  Microscope, telescopes, tower, microinstruments, KTP laser, rep conf# [id number] IC 7/28.    LARYNGOSCOPY N/A 3/16/2021    Procedure: Suspension microlaryngoscopy with excision of lesion, possible CO2 laser;  Surgeon: Stew Noel MD;  Location: [address];  Service: ENT;  Laterality: N/A;  Microscope, telescopes, tower, microinstruments, CO2 laser, rep conf# [id number] IC 3/4.    LARYNGOSCOPY N/A 4/1/2021    Procedure: Suspension microlaryngoscopy with KTP laser excision of  lesion;  Surgeon: Stew Noel MD;  [facility];  Service: ENT;  Laterality: N/A;  Microscope, telescopes, tower, microinstruments, 70 degree scope, vocal fold , KTP laser, rep conf# [id number] BC    LARYNGOSCOPY N/A 12/9/2021    Procedure: Suspension microlaryngoscopy with biopsy;  Surgeon: Stew Noel MD;  [facility];  Service: ENT;  Laterality: N/A;  Microscope, telescopes, tower, microinstruments    LARYNGOSCOPY N/A 1/6/2022    Procedure: LARYNGOSCOPY;  Surgeon: Jesse James MD;  [facility];  Service: ENT;  Laterality: N/A;    LARYNGOSCOPY N/A 4/27/2022    Procedure: LARYNGOSCOPY WITH BIOPSY;  Surgeon: Jesse James MD;  [facility];  Service: ENT;  Laterality: N/A;    MICROLARYNGOSCOPY N/A 3/17/2020    Procedure: MICROLARYNGOSCOPY;  Surgeon: Jung Xiao MD;  Location: [facility];  Service: ENT;  Laterality: N/A;  Laser Microlaryngoscopy  NEED TO SCHEDULE LASER from [employer] [id number]    REIMPLANTATION OF PARATHYROID TISSUE N/A 1/6/2022    Procedure: REIMPLANTATION, PARATHYROID TISSUE;  Surgeon: Jesse James MD;  [facility];  Service: ENT;  Laterality: N/A;    THYROIDECTOMY  1/6/2022    Procedure: THYROIDECTOMY;  Surgeon: Jesse James MD;  [facility];  Service: ENT;;    TRACHEOSTOMY N/A 12/27/2021    Procedure: CREATION, TRACHEOSTOMY;  Surgeon: Flex Espinosa MD;  [facility];  Service: ENT;  Laterality: N/A;       Review of patient's allergies indicates:   Allergen Reactions    Pollen extracts     Lovastatin Rash     Not confirmed but pt skeptical       Current Facility-Administered Medications on File Prior to Encounter   Medication    lactated ringers infusion     Current Outpatient Medications on File Prior to Encounter   Medication Sig    dexAMETHasone (DECADRON) 4 MG Tab Take 2 tablets (8 mg total) by mouth once daily. Take as directed on days 2,  3, and 4 of your chemotherapy cycle.    esomeprazole (NEXIUM) 40 MG capsule 40 mg before breakfast.    guaiFENesin-codeine 100-10 mg/5 ml (TUSSI-ORGANIDIN NR)  mg/5 mL syrup 5 mLs by Per G Tube route 3 (three) times daily as needed for Cough.    levothyroxine (SYNTHROID) 100 MCG tablet Take 1 tablet (100 mcg total) by mouth before breakfast.    losartan (COZAAR) 100 MG tablet TAKE 1 TABLET BY MOUTH EVERY DAY (Patient taking differently: Take 100 mg by mouth every evening.)    metFORMIN (GLUCOPHAGE) 500 MG tablet Take 1 tablet (500 mg total) by mouth 2 (two) times daily with meals.    OLANZapine (ZYPREXA) 5 MG tablet Take 1 tablet (5 mg total) by mouth every evening. Take as directed on days 1-4 of your chemotherapy cycle.    traZODone (DESYREL) 50 MG tablet Take 1 tablet (50 mg total) by mouth nightly as needed for Insomnia.    acetaminophen (TYLENOL) 325 MG tablet Take 325 mg by mouth every 6 (six) hours as needed for Pain.    diphenhydrAMINE (BENADRYL) 25 mg capsule Take 25 mg by mouth every 6 (six) hours as needed for Itching.    nut.tx.gluc intol,lf,soy-fiber (GLUCERNA 1.5 TRISHA) 0.08-1.5 gram-kcal/mL Liqd 5 Cans by PEG Tube route 5 (five) times daily.    [DISCONTINUED] amoxicillin-clavulanate (AUGMENTIN) 200-28.5 mg/5 mL SusR Take 10 mLs (400 mg total) by mouth every 12 (twelve) hours. Peg Tube    [DISCONTINUED] aspirin (ECOTRIN) 81 MG EC tablet Take 81 mg by mouth Daily.    [DISCONTINUED] cefdinir (OMNICEF) 250 mg/5 mL suspension Take 5 mLs (250 mg total) by mouth every 12 (twelve) hours.    [DISCONTINUED] ondansetron (ZOFRAN-ODT) 8 MG TbDL Take 1 tablet (8 mg total) by mouth every 8 (eight) hours as needed (nausea/vomiting).    [DISCONTINUED] prednisone 5 mg/5 mL Soln Take 20 mLs (20 mg total) by mouth once daily. Via peg  tube    [DISCONTINUED] scopolamine (TRANSDERM-SCOP) 1.3-1.5 mg (1 mg over 3 days) Place 1 patch onto the skin every 72 hours.     Family History       Problem Relation (Age of Onset)     Abnormal EKG Mother    Diabetes Father    Heart disease Father    Hypertension Father          Tobacco Use    Smoking status: Never    Smokeless tobacco: Never   Substance and Sexual Activity    Alcohol use: Not Currently     Comment: occasional    Drug use: No    Sexual activity: Yes     Partners: Female     Review of Systems   Constitutional:  Positive for fatigue.   HENT: Negative.     Eyes: Negative.    Respiratory: Negative.     Cardiovascular: Negative.    Gastrointestinal:  Positive for abdominal distention, abdominal pain, diarrhea, nausea and vomiting.   Endocrine: Negative.    Genitourinary: Negative.    Musculoskeletal: Negative.    Skin: Negative.    Allergic/Immunologic: Negative.    Neurological: Negative.    Hematological: Negative.    All other systems reviewed and are negative.  Objective:     Vital Signs (Most Recent):  Temp: 97.6 °F (36.4 °C) (09/01/22 1540)  Pulse: 82 (09/01/22 1710)  Resp: 18 (09/01/22 1540)  BP: 103/65 (09/01/22 1710)  SpO2: 100 % (09/01/22 1710) Vital Signs (24h Range):  Temp:  [97.6 °F (36.4 °C)] 97.6 °F (36.4 °C)  Pulse:  [82-88] 82  Resp:  [18] 18  SpO2:  [100 %] 100 %  BP: (103-125)/(65-70) 103/65     Weight: 59 kg (130 lb)  Body mass index is 20.98 kg/m².    Physical Exam  Vitals and nursing note reviewed.   Constitutional:       Appearance: He is well-developed.      Comments: Cachetic    HENT:      Head: Normocephalic and atraumatic.      Right Ear: External ear normal.      Left Ear: External ear normal.      Nose: Nose normal.   Eyes:      Conjunctiva/sclera: Conjunctivae normal.      Pupils: Pupils are equal, round, and reactive to light.   Cardiovascular:      Rate and Rhythm: Normal rate and regular rhythm.      Heart sounds: Normal heart sounds.   Pulmonary:      Effort: Pulmonary effort is normal.      Breath sounds: Normal breath sounds.      Comments: Tracheostomy   Abdominal:      General: Bowel sounds are normal.      Palpations: Abdomen is soft.       Comments: PEG  Voluntary guarding without peritoneal   Musculoskeletal:         General: Normal range of motion.      Cervical back: Normal range of motion and neck supple.   Skin:     General: Skin is warm and dry.      Capillary Refill: Capillary refill takes less than 2 seconds.   Neurological:      Mental Status: He is alert and oriented to person, place, and time.   Psychiatric:         Behavior: Behavior normal.         Thought Content: Thought content normal.         Judgment: Judgment normal.         CRANIAL NERVES     CN III, IV, VI   Pupils are equal, round, and reactive to light.     Significant Labs: All pertinent labs within the past 24 hours have been reviewed.  CBC:   Recent Labs   Lab 09/01/22  1638   WBC 2.12*   HGB 10.6*   HCT 31.6*        CMP:   Recent Labs   Lab 09/01/22  1638   *   K 3.5   CL 94*   CO2 22*   *   BUN 35*   CREATININE 0.8   CALCIUM 7.7*   PROT 5.3*   ALBUMIN 3.0*   BILITOT 0.8   ALKPHOS 60   AST 15   ALT 12   ANIONGAP 10     Cardiac Markers: No results for input(s): CKMB, MYOGLOBIN, BNP, TROPISTAT in the last 48 hours.  Lactic Acid:   Recent Labs   Lab 09/01/22  1638   LACTATE 3.1*     TSH:   Recent Labs   Lab 09/01/22  1638   TSH 36.860*       Significant Imaging: I have reviewed all pertinent imaging results/findings within the past 24 hours.

## 2022-09-02 NOTE — PROGRESS NOTES
General Surgery Progress Note    Admit Date: 9/1/2022  S/P: Procedure(s) (LRB):  LAPAROTOMY, EXPLORATORY (N/A)    Post-operative Day:      Hospital Day: 2    SUBJECTIVE:   No acute events overnight. Afebrile. Does not complain of any abdominal pain this morning.  Has not had any further nausea, vomiting or diarrhea.    OBJECTIVE:     Vital Signs (Most Recent)  Temp:  [97.6 °F (36.4 °C)-98.9 °F (37.2 °C)] 97.9 °F (36.6 °C)  Pulse:  [81-94] 81  Resp:  [14-18] 18  SpO2:  [99 %-100 %] 99 %  BP: (103-125)/(65-82) 125/73    I&Os:  I/O last 3 completed shifts:  In: 1519.6 [I.V.:1031.3]  Out: 850 [Urine:850]    Physical Exam:  Gen: NAD, AAOx3  HEENT: Anicteric sclera  Pulm: unlabored, symmetrical   Abd: Benign, no rebound, no guarding, G-tube in place    Laboratory:  CBC:   Recent Labs   Lab 09/02/22  0600   WBC 1.73*   RBC 3.32*   HGB 8.7*   HCT 26.2*      MCV 79*   MCH 26.2*   MCHC 33.2     CMP:   Recent Labs   Lab 09/02/22  0600   *   CALCIUM 7.2*   ALBUMIN 2.5*   PROT 4.5*   *   K 3.2*   CO2 23      BUN 23   CREATININE 0.6   ALKPHOS 48*   ALT 11   AST 13   BILITOT 0.7     Lactic Acid 2.7 -> 1.2  CRP 0.81 -> 1.74  Procal- pending    Labs within the past 24 hours have been reviewed.    ASSESSMENT/PLAN:     Patient Active Problem List    Diagnosis Date Noted    Pneumatosis intestinalis 09/01/2022    Nausea and vomiting 09/01/2022    Pressure injury of sacral region, stage 1 08/18/2022    Iron deficiency anemia due to chronic blood loss 07/07/2022    Postoperative 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    Primary cancer of base of tongue 06/22/2022    Advanced care planning/counseling discussion 06/22/2022    Oral bleeding 05/20/2022    Acute pain of both shoulders 03/10/2022    Bilateral arm weakness 03/10/2022    Aphonia 01/31/2022    Dysphagia, pharyngoesophageal 01/31/2022    Hypocalcemia 01/12/2022    S/P laryngectomy     Adverse effect of adrenal cortical steroids, sequela 01/07/2022    Hemoptysis  12/25/2021    NSVT (nonsustained ventricular tachycardia) 01/13/2021    Dehydration 10/27/2020    Larynx cancer 08/04/2020    On enteral nutrition 07/30/2019    Proteinuria 07/03/2019    Type 2 diabetes mellitus with hyperglycemia 07/02/2019    Vitamin B12 deficiency 07/02/2019    Hyperuricemia 07/02/2019    Hypovitaminosis D 07/02/2019    Type 2 diabetes mellitus with hyperglycemia, without long-term current use of insulin 01/30/2019    Gastroesophageal reflux disease without esophagitis 01/30/2019    Hyperlipidemia 07/19/2018    Melanocytic nevus 07/03/2017    Body mass index (BMI) of 29.0-29.9 in adult 07/03/2017    Overweight 07/03/2017    Paroxysmal atrial fibrillation 07/03/2017    Type 2 diabetes mellitus with diabetic arthropathy, with long-term current use of insulin 07/03/2017    Benign hypertension 06/08/2016    Fatty liver disease, nonalcoholic 03/29/2012    False positive serological test for hepatitis C 09/15/2009       ASSESSMENT & PLAN:  72 y.o. 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

## 2022-09-02 NOTE — CONSULTS
"[facility]  Adult Nutrition   Consult Note (Nutrition Support Management)    SUMMARY     Recommendations  Recommendation/Intervention:   1) Start Glucerna 1.5 at 10ml/hr this evening. Water flushes: 30ml every 4 hours.   2) If patient tolerates TF, advance 10ml every 4 hours to goal rate 60ml/hr to provide 2160kcal, 119gm Protein, 1093ml free fl.   3) Start TPN tomorrow if patient is unable to tolerate TF. Labs ordered.   4) NST will f/u tomorrow and adress TF vs TPN.    Goals: Patient to meet >75% of needs via nutrition support  Nutrition Goal Status: new    Dietitian Rounds Brief  Patient admitted with Pneumatosis intestinalis. Patient admitted with N/V diarrhea. He has not been able to tolerate his TF over the past week. He has been on Clinimix and possible TPN tomorrow if unable to tolerate trickle feeds.     Diet order:   Current Diet Order: NPO      Current Nutrition Support Formula Ordered: Clinimix 4.25/5  Current Nutrition Support Rate Ordered: 100 (ml)  Current Nutrition Support Frequency Ordered: ml/hr    Evaluation of Received Nutrient/Fluid Intake  Parenteral Calories (kcal): 816  Parenteral Protein (gm): 102  Parenteral Fluid (mL): 2400  Energy Calories Required: not meeting needs  Protein Required: exceeds needs  Fluid Required: meeting needs  Tolerance: tolerating     % Intake of Estimated Energy Needs: 25 - 50 %  % Meal Intake: NPO      Intake/Output Summary (Last 24 hours) at 9/2/2022 1549  Last data filed at 9/2/2022 1035  Gross per 24 hour   Intake 1569.58 ml   Output 1400 ml   Net 169.58 ml      Anthropometrics  Temp: 98.1 °F (36.7 °C)  Height Method: Stated  Height: 5' 6" (167.6 cm)  Height (inches): 66 in  Weight Method: Bed Scale  Weight: 60.9 kg (134 lb 4.2 oz)  Weight (lb): 134.26 lb  Ideal Body Weight (IBW), Male: 142 lb  % Ideal Body Weight, Male (lb): 94.55 %  BMI (Calculated): 21.7  BMI Grade: 18.5-24.9 - normal     Estimated/Assessed Needs  Weight Used For Calorie " Calculations: 61 kg (134 lb 7.7 oz)  Energy Calorie Requirements (kcal): 4733-5499 (30-35)  Energy Need Method: Kcal/kg  Protein Requirements: 73-92gm (1.2-1.5gm/kg)  Weight Used For Protein Calculations: 61 kg (134 lb 7.7 oz)     Estimated Fluid Requirement Method: RDA Method  RDA Method (mL): 1830       Reason for Assessment  Reason For Assessment: consult  Diagnosis:  (Pneumatosis intestinalis)  Relevant Medical History: Larynx cancer, DM2, HLD    Nutrition/Diet History  Patient Reported Diet/Restrictions/Preferences: other (see comments) (TF)  Food Allergies: NKFA  Factors Affecting Nutritional Intake: NPO  Nutrition Support Formula Prior to Admit: Glucerna 1.5  Nutrition Support Rate Prior to Admit: 240 (ml)  Nutrtion Support Frequency Prior to Admit: 5x/day with 1000L water flushes    Nutrition Risk Screen  Nutrition Risk Screen: tube feeding or parenteral nutrition     MST Score: 2  Have you recently lost weight without trying?: Yes: 2-13 lbs  Weight loss score: 1  Have you been eating poorly because of a decreased appetite?: Yes  Appetite score: 1       Weight History:  Wt Readings from Last 10 Encounters:   09/01/22 60.9 kg (134 lb 4.2 oz)   08/26/22 62.2 kg (137 lb 1.6 oz)   08/22/22 60.1 kg (132 lb 9.6 oz)   08/22/22 59.8 kg (131 lb 13.4 oz)   08/19/22 62.6 kg (138 lb)   08/12/22 62 kg (136 lb 11.2 oz)   08/05/22 61.7 kg (136 lb)   08/03/22 62.7 kg (138 lb 3.7 oz)   08/01/22 62.7 kg (138 lb 3.7 oz)   08/01/22 62.7 kg (138 lb 3.7 oz)      Lab/Procedures/Meds: Pertinent Labs/Meds Reviewed    Medications:Pertinent Medications Reviewed  Scheduled Meds:   enoxaparin  40 mg Subcutaneous Daily    levothyroxine  50 mcg Intravenous Daily    pantoprazole  40 mg Intravenous Daily    piperacillin-tazobactam (ZOSYN) IVPB  3.375 g Intravenous Q8H     Continuous Infusions:   amino acid 4.25 % in D5W 2,000 mL (09/02/22 1538)    lactated ringers 125 mL/hr at 09/02/22 1057     PRN Meds:.calcium chloride IVPB, calcium  chloride IVPB, calcium chloride IVPB, dextrose 10%, dextrose 10%, hydrALAZINE, insulin aspart U-100, magnesium oxide, magnesium sulfate IVPB, magnesium sulfate IVPB, magnesium sulfate IVPB, magnesium sulfate IVPB, melatonin, morphine, ondansetron, potassium chloride, potassium chloride, potassium chloride, potassium chloride, sodium phosphate IVPB, sodium phosphate IVPB, sodium phosphate IVPB, sodium phosphate IVPB, sodium phosphate IVPB    Labs: Pertinent Labs Reviewed  Clinical Chemistry:  Recent Labs   Lab 09/01/22  1638 09/02/22  0600   * 131*   K 3.5 3.2*   CL 94* 101   CO2 22* 23   * 157*   BUN 35* 23   CREATININE 0.8 0.6   CALCIUM 7.7* 7.2*   PROT 5.3* 4.5*   ALBUMIN 3.0* 2.5*   BILITOT 0.8 0.7   ALKPHOS 60 48*   AST 15 13   ALT 12 11   ANIONGAP 10 7*   MG 1.4*  --    PHOS 2.6*  --    LIPASE 53  --      CBC:   Recent Labs   Lab 09/02/22  0600   WBC 1.73*   RBC 3.32*   HGB 8.7*   HCT 26.2*      MCV 79*   MCH 26.2*   MCHC 33.2     Inflammatory Labs:  Recent Labs   Lab 09/01/22  2039 09/02/22  0600   CRP 0.81* 1.74*       Thyroid & Parathyroid:  Recent Labs   Lab 09/01/22  1638   TSH 36.860*   FREET4 0.57*       Monitor and Evaluation  Food and Nutrient Intake: enteral nutrition intake, parenteral nutrition intake  Food and Nutrient Adminstration: enteral and parenteral nutrition administration  Physical Activity and Function: nutrition-related ADLs and IADLs  Anthropometric Measurements: weight change  Biochemical Data, Medical Tests and Procedures: electrolyte and renal panel, gastrointestinal profile, glucose/endocrine profile  Nutrition-Focused Physical Findings: overall appearance     Nutrition Risk  Level of Risk/Frequency of Follow-up: high     Nutrition Follow-Up  RD Follow-up?: Yes      Magda Akins, JAMIR 09/02/2022 3:49 PM

## 2022-09-03 LAB
ALBUMIN SERPL BCP-MCNC: 2.5 G/DL (ref 3.5–5.2)
ALP SERPL-CCNC: 46 U/L (ref 55–135)
ALT SERPL W/O P-5'-P-CCNC: 10 U/L (ref 10–44)
ANION GAP SERPL CALC-SCNC: 5 MMOL/L (ref 8–16)
ANION GAP SERPL CALC-SCNC: 6 MMOL/L (ref 8–16)
ANISOCYTOSIS BLD QL SMEAR: ABNORMAL
AST SERPL-CCNC: 11 U/L (ref 10–40)
BACTERIA #/AREA URNS HPF: NEGATIVE /HPF
BASOPHILS NFR BLD: 0 % (ref 0–1.9)
BILIRUB SERPL-MCNC: 0.7 MG/DL (ref 0.1–1)
BILIRUB UR QL STRIP: NEGATIVE
BUN SERPL-MCNC: 19 MG/DL (ref 8–23)
BUN SERPL-MCNC: 19 MG/DL (ref 8–23)
CALCIUM SERPL-MCNC: 7 MG/DL (ref 8.7–10.5)
CALCIUM SERPL-MCNC: 7.3 MG/DL (ref 8.7–10.5)
CHLORIDE SERPL-SCNC: 102 MMOL/L (ref 95–110)
CHLORIDE SERPL-SCNC: 103 MMOL/L (ref 95–110)
CLARITY UR: CLEAR
CO2 SERPL-SCNC: 23 MMOL/L (ref 23–29)
CO2 SERPL-SCNC: 23 MMOL/L (ref 23–29)
COLOR UR: COLORLESS
CREAT SERPL-MCNC: 0.7 MG/DL (ref 0.5–1.4)
CREAT SERPL-MCNC: 0.7 MG/DL (ref 0.5–1.4)
DACRYOCYTES BLD QL SMEAR: ABNORMAL
DIFFERENTIAL METHOD: ABNORMAL
EOSINOPHIL NFR BLD: 0 % (ref 0–8)
ERYTHROCYTE [DISTWIDTH] IN BLOOD BY AUTOMATED COUNT: 22.4 % (ref 11.5–14.5)
EST. GFR  (NO RACE VARIABLE): >60 ML/MIN/1.73 M^2
EST. GFR  (NO RACE VARIABLE): >60 ML/MIN/1.73 M^2
GLUCOSE SERPL-MCNC: 144 MG/DL (ref 70–110)
GLUCOSE SERPL-MCNC: 152 MG/DL (ref 70–110)
GLUCOSE SERPL-MCNC: 156 MG/DL (ref 70–110)
GLUCOSE SERPL-MCNC: 158 MG/DL (ref 70–110)
GLUCOSE SERPL-MCNC: 181 MG/DL (ref 70–110)
GLUCOSE SERPL-MCNC: 202 MG/DL (ref 70–110)
GLUCOSE UR QL STRIP: ABNORMAL
HCT VFR BLD AUTO: 24.5 % (ref 40–54)
HGB BLD-MCNC: 8 G/DL (ref 14–18)
HGB UR QL STRIP: NEGATIVE
HYALINE CASTS #/AREA URNS LPF: 0 /LPF
IMM GRANULOCYTES # BLD AUTO: ABNORMAL K/UL (ref 0–0.04)
IMM GRANULOCYTES NFR BLD AUTO: ABNORMAL % (ref 0–0.5)
KETONES UR QL STRIP: NEGATIVE
LACTATE SERPL-SCNC: 1.2 MMOL/L (ref 0.5–1.9)
LEUKOCYTE ESTERASE UR QL STRIP: NEGATIVE
LYMPHOCYTES NFR BLD: 37 % (ref 18–48)
MAGNESIUM SERPL-MCNC: 1.1 MG/DL (ref 1.6–2.6)
MAGNESIUM SERPL-MCNC: 1.7 MG/DL (ref 1.6–2.6)
MCH RBC QN AUTO: 26.1 PG (ref 27–31)
MCHC RBC AUTO-ENTMCNC: 32.7 G/DL (ref 32–36)
MCV RBC AUTO: 80 FL (ref 82–98)
MICROSCOPIC COMMENT: ABNORMAL
MONOCYTES NFR BLD: 6 % (ref 4–15)
NEUTROPHILS NFR BLD: 43 % (ref 38–73)
NEUTS BAND NFR BLD MANUAL: 14 %
NITRITE UR QL STRIP: NEGATIVE
NRBC BLD-RTO: 0 /100 WBC
OVALOCYTES BLD QL SMEAR: ABNORMAL
PH UR STRIP: 8 [PH] (ref 5–8)
PHOSPHATE SERPL-MCNC: 1.2 MG/DL (ref 2.7–4.5)
PLATELET # BLD AUTO: 150 K/UL (ref 150–450)
PLATELET BLD QL SMEAR: ABNORMAL
PMV BLD AUTO: 10.3 FL (ref 9.2–12.9)
POIKILOCYTOSIS BLD QL SMEAR: SLIGHT
POLYCHROMASIA BLD QL SMEAR: ABNORMAL
POTASSIUM SERPL-SCNC: 2.6 MMOL/L (ref 3.5–5.1)
POTASSIUM SERPL-SCNC: 3.3 MMOL/L (ref 3.5–5.1)
POTASSIUM SERPL-SCNC: 3.3 MMOL/L (ref 3.5–5.1)
PREALB SERPL-MCNC: 19 MG/DL (ref 20–43)
PROT SERPL-MCNC: 4.7 G/DL (ref 6–8.4)
PROT UR QL STRIP: NEGATIVE
RBC # BLD AUTO: 3.07 M/UL (ref 4.6–6.2)
RBC #/AREA URNS HPF: 0 /HPF (ref 0–4)
SODIUM SERPL-SCNC: 130 MMOL/L (ref 136–145)
SODIUM SERPL-SCNC: 132 MMOL/L (ref 136–145)
SP GR UR STRIP: 1.01 (ref 1–1.03)
SQUAMOUS #/AREA URNS HPF: 0 /HPF
TRIGL SERPL-MCNC: 120 MG/DL (ref 30–150)
URN SPEC COLLECT METH UR: ABNORMAL
UROBILINOGEN UR STRIP-ACNC: NEGATIVE EU/DL
WBC # BLD AUTO: 1.29 K/UL (ref 3.9–12.7)
WBC #/AREA URNS HPF: 0 /HPF (ref 0–5)
YEAST URNS QL MICRO: ABNORMAL

## 2022-09-03 PROCEDURE — 12000002 HC ACUTE/MED SURGE SEMI-PRIVATE ROOM

## 2022-09-03 PROCEDURE — 84134 ASSAY OF PREALBUMIN: CPT | Performed by: INTERNAL MEDICINE

## 2022-09-03 PROCEDURE — 25000003 PHARM REV CODE 250: Performed by: INTERNAL MEDICINE

## 2022-09-03 PROCEDURE — 80053 COMPREHEN METABOLIC PANEL: CPT | Performed by: INTERNAL MEDICINE

## 2022-09-03 PROCEDURE — 99900031 HC PATIENT EDUCATION (STAT)

## 2022-09-03 PROCEDURE — 99900035 HC TECH TIME PER 15 MIN (STAT)

## 2022-09-03 PROCEDURE — 99232 PR SUBSEQUENT HOSPITAL CARE,LEVL II: ICD-10-PCS | Mod: ,,, | Performed by: STUDENT IN AN ORGANIZED HEALTH CARE EDUCATION/TRAINING PROGRAM

## 2022-09-03 PROCEDURE — 81001 URINALYSIS AUTO W/SCOPE: CPT | Performed by: INTERNAL MEDICINE

## 2022-09-03 PROCEDURE — 80048 BASIC METABOLIC PNL TOTAL CA: CPT | Performed by: INTERNAL MEDICINE

## 2022-09-03 PROCEDURE — C9113 INJ PANTOPRAZOLE SODIUM, VIA: HCPCS | Performed by: INTERNAL MEDICINE

## 2022-09-03 PROCEDURE — 83735 ASSAY OF MAGNESIUM: CPT | Mod: 91 | Performed by: INTERNAL MEDICINE

## 2022-09-03 PROCEDURE — 84478 ASSAY OF TRIGLYCERIDES: CPT | Performed by: INTERNAL MEDICINE

## 2022-09-03 PROCEDURE — 63600175 PHARM REV CODE 636 W HCPCS: Performed by: INTERNAL MEDICINE

## 2022-09-03 PROCEDURE — 99232 SBSQ HOSP IP/OBS MODERATE 35: CPT | Mod: ,,, | Performed by: STUDENT IN AN ORGANIZED HEALTH CARE EDUCATION/TRAINING PROGRAM

## 2022-09-03 PROCEDURE — 94761 N-INVAS EAR/PLS OXIMETRY MLT: CPT

## 2022-09-03 PROCEDURE — 84100 ASSAY OF PHOSPHORUS: CPT | Performed by: INTERNAL MEDICINE

## 2022-09-03 PROCEDURE — 85027 COMPLETE CBC AUTOMATED: CPT | Performed by: INTERNAL MEDICINE

## 2022-09-03 PROCEDURE — 85007 BL SMEAR W/DIFF WBC COUNT: CPT | Performed by: INTERNAL MEDICINE

## 2022-09-03 PROCEDURE — 83605 ASSAY OF LACTIC ACID: CPT | Performed by: SURGERY

## 2022-09-03 RX ORDER — MAGNESIUM SULFATE HEPTAHYDRATE 40 MG/ML
4 INJECTION, SOLUTION INTRAVENOUS
Status: DISCONTINUED | OUTPATIENT
Start: 2022-09-03 | End: 2022-09-06 | Stop reason: HOSPADM

## 2022-09-03 RX ORDER — POTASSIUM CHLORIDE 7.45 MG/ML
40 INJECTION INTRAVENOUS
Status: DISCONTINUED | OUTPATIENT
Start: 2022-09-03 | End: 2022-09-06 | Stop reason: HOSPADM

## 2022-09-03 RX ORDER — POTASSIUM CHLORIDE 7.45 MG/ML
80 INJECTION INTRAVENOUS
Status: DISCONTINUED | OUTPATIENT
Start: 2022-09-03 | End: 2022-09-06 | Stop reason: HOSPADM

## 2022-09-03 RX ORDER — CALCIUM GLUCONATE 20 MG/ML
3 INJECTION, SOLUTION INTRAVENOUS
Status: DISCONTINUED | OUTPATIENT
Start: 2022-09-03 | End: 2022-09-06 | Stop reason: HOSPADM

## 2022-09-03 RX ORDER — POTASSIUM CHLORIDE 20 MEQ/1
20 TABLET, EXTENDED RELEASE ORAL DAILY
Status: DISCONTINUED | OUTPATIENT
Start: 2022-09-03 | End: 2022-09-06

## 2022-09-03 RX ORDER — CALCIUM GLUCONATE 20 MG/ML
1 INJECTION, SOLUTION INTRAVENOUS
Status: DISCONTINUED | OUTPATIENT
Start: 2022-09-03 | End: 2022-09-06 | Stop reason: HOSPADM

## 2022-09-03 RX ORDER — POTASSIUM CHLORIDE 7.45 MG/ML
20 INJECTION INTRAVENOUS ONCE
Status: COMPLETED | OUTPATIENT
Start: 2022-09-03 | End: 2022-09-03

## 2022-09-03 RX ORDER — CALCIUM GLUCONATE 20 MG/ML
2 INJECTION, SOLUTION INTRAVENOUS
Status: DISCONTINUED | OUTPATIENT
Start: 2022-09-03 | End: 2022-09-06 | Stop reason: HOSPADM

## 2022-09-03 RX ORDER — MAGNESIUM SULFATE HEPTAHYDRATE 40 MG/ML
2 INJECTION, SOLUTION INTRAVENOUS
Status: DISCONTINUED | OUTPATIENT
Start: 2022-09-03 | End: 2022-09-06 | Stop reason: HOSPADM

## 2022-09-03 RX ORDER — SODIUM CHLORIDE AND POTASSIUM CHLORIDE 150; 900 MG/100ML; MG/100ML
INJECTION, SOLUTION INTRAVENOUS CONTINUOUS
Status: DISPENSED | OUTPATIENT
Start: 2022-09-03 | End: 2022-09-04

## 2022-09-03 RX ORDER — POTASSIUM CHLORIDE 7.45 MG/ML
60 INJECTION INTRAVENOUS
Status: DISCONTINUED | OUTPATIENT
Start: 2022-09-03 | End: 2022-09-06 | Stop reason: HOSPADM

## 2022-09-03 RX ADMIN — PIPERACILLIN SODIUM AND TAZOBACTAM SODIUM 3.38 G: 3; .375 INJECTION, POWDER, LYOPHILIZED, FOR SOLUTION INTRAVENOUS at 03:09

## 2022-09-03 RX ADMIN — INSULIN ASPART 2 UNITS: 100 INJECTION, SOLUTION INTRAVENOUS; SUBCUTANEOUS at 10:09

## 2022-09-03 RX ADMIN — LEVOTHYROXINE SODIUM ANHYDROUS 50 MCG: 100 INJECTION, POWDER, LYOPHILIZED, FOR SOLUTION INTRAVENOUS at 06:09

## 2022-09-03 RX ADMIN — LEUCINE, PHENYLALANINE, LYSINE, METHIONINE, ISOLEUCINE, VALINE, HISTIDINE, THREONINE, TRYPTOPHAN, ALANINE, GLYCINE, ARGININE, PROLINE, SERINE, TYROSINE, DEXTROSE 2000 ML: 311; 238; 247; 170; 255; 247; 204; 179; 77; 880; 438; 489; 289; 213; 17; 5 INJECTION INTRAVENOUS at 01:09

## 2022-09-03 RX ADMIN — PIPERACILLIN SODIUM AND TAZOBACTAM SODIUM 3.38 G: 3; .375 INJECTION, POWDER, LYOPHILIZED, FOR SOLUTION INTRAVENOUS at 06:09

## 2022-09-03 RX ADMIN — INSULIN ASPART 1 UNITS: 100 INJECTION, SOLUTION INTRAVENOUS; SUBCUTANEOUS at 01:09

## 2022-09-03 RX ADMIN — POTASSIUM CHLORIDE 20 MEQ: 7.46 INJECTION, SOLUTION INTRAVENOUS at 05:09

## 2022-09-03 RX ADMIN — POTASSIUM CHLORIDE 20 MEQ: 7.46 INJECTION, SOLUTION INTRAVENOUS at 09:09

## 2022-09-03 RX ADMIN — POTASSIUM CHLORIDE 20 MEQ: 7.46 INJECTION, SOLUTION INTRAVENOUS at 07:09

## 2022-09-03 RX ADMIN — LEUCINE, PHENYLALANINE, LYSINE, METHIONINE, ISOLEUCINE, VALINE, HISTIDINE, THREONINE, TRYPTOPHAN, ALANINE, GLYCINE, ARGININE, PROLINE, SERINE, TYROSINE, DEXTROSE 2000 ML: 311; 238; 247; 170; 255; 247; 204; 179; 77; 880; 438; 489; 289; 213; 17; 5 INJECTION INTRAVENOUS at 03:09

## 2022-09-03 RX ADMIN — ENOXAPARIN SODIUM 40 MG: 40 INJECTION SUBCUTANEOUS at 04:09

## 2022-09-03 RX ADMIN — MAGNESIUM SULFATE HEPTAHYDRATE 4 G: 40 INJECTION, SOLUTION INTRAVENOUS at 05:09

## 2022-09-03 RX ADMIN — SODIUM CHLORIDE AND POTASSIUM CHLORIDE: .9; .15 SOLUTION INTRAVENOUS at 09:09

## 2022-09-03 RX ADMIN — LEUCINE, PHENYLALANINE, LYSINE, METHIONINE, ISOLEUCINE, VALINE, HISTIDINE, THREONINE, TRYPTOPHAN, ALANINE, GLYCINE, ARGININE, PROLINE, SERINE, TYROSINE, DEXTROSE 2000 ML: 311; 238; 247; 170; 255; 247; 204; 179; 77; 880; 438; 489; 289; 213; 17; 5 INJECTION INTRAVENOUS at 06:09

## 2022-09-03 RX ADMIN — PANTOPRAZOLE SODIUM 40 MG: 40 INJECTION, POWDER, FOR SOLUTION INTRAVENOUS at 09:09

## 2022-09-03 RX ADMIN — SCOPOLAMINE 1 PATCH: 1 PATCH TRANSDERMAL at 03:09

## 2022-09-03 RX ADMIN — PIPERACILLIN SODIUM AND TAZOBACTAM SODIUM 3.38 G: 3; .375 INJECTION, POWDER, LYOPHILIZED, FOR SOLUTION INTRAVENOUS at 10:09

## 2022-09-03 RX ADMIN — SODIUM PHOSPHATE, MONOBASIC, MONOHYDRATE AND SODIUM PHOSPHATE, DIBASIC, ANHYDROUS 20.01 MMOL: 276; 142 INJECTION, SOLUTION INTRAVENOUS at 01:09

## 2022-09-03 NOTE — SUBJECTIVE & OBJECTIVE
Interval History:     Review of Systems  Objective:     Vital Signs (Most Recent):  Temp: 98.1 °F (36.7 °C) (09/03/22 1150)  Pulse: 73 (09/03/22 1150)  Resp: 18 (09/03/22 1150)  BP: 119/79 (09/03/22 1150)  SpO2: 100 % (09/03/22 1150) Vital Signs (24h Range):  Temp:  [97.7 °F (36.5 °C)-99 °F (37.2 °C)] 98.1 °F (36.7 °C)  Pulse:  [73-94] 73  Resp:  [16-18] 18  SpO2:  [97 %-100 %] 100 %  BP: (118-128)/(74-79) 119/79     Weight: 60.9 kg (134 lb 4.2 oz)  Body mass index is 21.67 kg/m².    Intake/Output Summary (Last 24 hours) at 9/3/2022 1340  Last data filed at 9/3/2022 1150  Gross per 24 hour   Intake 50 ml   Output 3100 ml   Net -3050 ml      Physical Exam  Constitutional:       General: He is not in acute distress.     Appearance: He is well-developed.   HENT:      Head: Normocephalic.   Eyes:      Pupils: Pupils are equal, round, and reactive to light.   Cardiovascular:      Rate and Rhythm: Normal rate and regular rhythm.   Pulmonary:      Effort: No respiratory distress.   Abdominal:      General: There is no distension.      Palpations: There is no mass.      Tenderness: There is no abdominal tenderness.   Musculoskeletal:      Cervical back: Neck supple.   Skin:     Findings: No rash.   Neurological:      Mental Status: He is alert and oriented to person, place, and time.       Significant Labs: All pertinent labs within the past 24 hours have been reviewed.  BMP:   Recent Labs   Lab 09/03/22  0345   *   *   K 2.6*      CO2 23   BUN 19   CREATININE 0.7   CALCIUM 7.0*   MG 1.1*     CBC:   Recent Labs   Lab 09/01/22  1638 09/02/22  0600 09/03/22  0345   WBC 2.12* 1.73* 1.29*   HGB 10.6* 8.7* 8.0*   HCT 31.6* 26.2* 24.5*    184 150       Significant Imaging: I have reviewed all pertinent imaging results/findings within the past 24 hours.

## 2022-09-03 NOTE — PROGRESS NOTES
Critical access hospital Medicine  Progress Note    Patient Name: Cyrus Batres Jr.  MRN: [id number]  Patient Class: IP- Inpatient   Admission Date: 9/1/2022  Length of Stay: 2 days  Attending Physician: Theron Mar MD  Primary Care Provider: Maico Patterson MD        Subjective:     Principal Problem:Pneumatosis intestinalis        HPI:  72 year old male with history of Laryngeal Ca, A fib, DM 2. HTN, Post-op Hypothyroidism, and has had 2/3 COVID vaccinations presented to ED complaining of generalized abdominal pain, nausea and vomiting, and diarrhea for the past 7-10 day worsening over the paswt 2 days. Pain: generalized 8/10 waxing and waning. Vomitus: anything he puts in PEG. Diarrhea: brown, no blood or black.     In ED: labs reviewed and noted below: minimal leukopenia with mild normocytic anemia (normal platelets); mild hyponatremia with normal renal function and prerenal azotemia; hepatic function is normal; lactate is elevated; markedly elevated TSH with below normal T4. CT Abdo: Pneumatosis intestinalis. CXR reviewed: NAPD. EKG reviewed: sinus with no acute segments.    Discussed with ED MD: Surgeon involved; will change medication to parental including l-thyroxine; start Clinamix currently until TPN can be initiated; C dif toxin review; AM labs for review; hydration for lactic acidosis; piperacillin      Overview/Hospital Course:  Patient was seen and examined  No fever and no abdominal pain  Abdominal exam is benign.  ANC 1.6.  Lactic acid back to the normal.  TSH elevation noted  CT abdominal findings noted.  Surgeon review noted  On exam patient is totally benign abdominal    9/3  Patient seen and examined  He did not tolerate tube feeding yesterday.  No more diarrhea.  On Clinimix   Patient report that he do better with plain yogurt in the tube and ordered.  K severely low and replacing with IV and p.o. in the fluid.  Abdominal exam is still benign      Interval History:     Review of  Systems  Objective:     Vital Signs (Most Recent):  Temp: 98.1 °F (36.7 °C) (09/03/22 1150)  Pulse: 73 (09/03/22 1150)  Resp: 18 (09/03/22 1150)  BP: 119/79 (09/03/22 1150)  SpO2: 100 % (09/03/22 1150) Vital Signs (24h Range):  Temp:  [97.7 °F (36.5 °C)-99 °F (37.2 °C)] 98.1 °F (36.7 °C)  Pulse:  [73-94] 73  Resp:  [16-18] 18  SpO2:  [97 %-100 %] 100 %  BP: (118-128)/(74-79) 119/79     Weight: 60.9 kg (134 lb 4.2 oz)  Body mass index is 21.67 kg/m².    Intake/Output Summary (Last 24 hours) at 9/3/2022 1340  Last data filed at 9/3/2022 1150  Gross per 24 hour   Intake 50 ml   Output 3100 ml   Net -3050 ml      Physical Exam  Constitutional:       General: He is not in acute distress.     Appearance: He is well-developed.   HENT:      Head: Normocephalic.   Eyes:      Pupils: Pupils are equal, round, and reactive to light.   Cardiovascular:      Rate and Rhythm: Normal rate and regular rhythm.   Pulmonary:      Effort: No respiratory distress.   Abdominal:      General: There is no distension.      Palpations: There is no mass.      Tenderness: There is no abdominal tenderness.   Musculoskeletal:      Cervical back: Neck supple.   Skin:     Findings: No rash.   Neurological:      Mental Status: He is alert and oriented to person, place, and time.       Significant Labs: All pertinent labs within the past 24 hours have been reviewed.  BMP:   Recent Labs   Lab 09/03/22  0345   *   *   K 2.6*      CO2 23   BUN 19   CREATININE 0.7   CALCIUM 7.0*   MG 1.1*     CBC:   Recent Labs   Lab 09/01/22  1638 09/02/22  0600 09/03/22  0345   WBC 2.12* 1.73* 1.29*   HGB 10.6* 8.7* 8.0*   HCT 31.6* 26.2* 24.5*    184 150       Significant Imaging: I have reviewed all pertinent imaging results/findings within the past 24 hours.      Assessment/Plan:      Active Hospital Problems    Diagnosis    *Pneumatosis intestinalis    Nausea and vomiting    Larynx cancer     9/16/20-10/30/20 radiation to larynx and  bilateral necks  1/6/22 TL with bilateral neck dissection and total thyroidectomy      Type 2 diabetes mellitus with hyperglycemia    Type 2 diabetes mellitus with hyperglycemia, without long-term current use of insulin    Hyperlipidemia    Paroxysmal atrial fibrillation     Patient was recently hospitalized with bilateral pneumonia at [facility]. He was treated accordingly with antibiotics and breathing treatment. He was also found to have atrial fibrillation and was started on Eliquis.      Type 2 diabetes mellitus with diabetic arthropathy, with long-term current use of insulin     Pt has diabetes in 2016 after he had suffered pneumonia in the hospital.          PLAN   Hold  tube feeding   clinimix   Observation as per surgeon , may need repeat CT abdomen  tomorrow  Hold chemo   Oncology review may need   Monitor ANC.   Zosyn   Patient was on dexa at home along with chemo , if BP low , will start IV   IV synthroid half dose    replace K and repeat in the evening   Possible need TNP if not better . But patient is happy to take .    VTE Risk Mitigation (From admission, onward)           Ordered     enoxaparin injection 40 mg  Daily         09/01/22 2201     IP VTE HIGH RISK PATIENT  Once         09/01/22 2201     Place sequential compression device  Until discontinued         09/01/22 2201                    Discharge Planning   CHIARA: 9/5/2022     Code Status: Full Code   Is the patient medically ready for discharge?:     Reason for patient still in hospital (select all that apply): Treatment  Discharge Plan A: Home with family                  Theron Mar MD  Department of Hospital Medicine   [facility]

## 2022-09-03 NOTE — PLAN OF CARE
Problem: Adult Inpatient Plan of Care  Goal: Plan of Care Review  Outcome: Ongoing, Progressing  Goal: Patient-Specific Goal (Individualized)  Outcome: Ongoing, Progressing  Goal: Absence of Hospital-Acquired Illness or Injury  Outcome: Ongoing, Progressing  Goal: Optimal Comfort and Wellbeing  Outcome: Ongoing, Progressing  Goal: Readiness for Transition of Care  Outcome: Ongoing, Progressing     Problem: Diabetes Comorbidity  Goal: Blood Glucose Level Within Targeted Range  Outcome: Ongoing, Progressing     Problem: Infection  Goal: Absence of Infection Signs and Symptoms  Outcome: Ongoing, Progressing     Problem: Skin Injury Risk Increased  Goal: Skin Health and Integrity  Outcome: Ongoing, Progressing     Problem: Aspiration (Enteral Nutrition)  Goal: Absence of Aspiration Signs and Symptoms  Outcome: Ongoing, Progressing     Problem: Device-Related Complication Risk (Enteral Nutrition)  Goal: Safe, Effective Therapy Delivery  Outcome: Ongoing, Progressing     Problem: Feeding Intolerance (Enteral Nutrition)  Goal: Feeding Tolerance  Outcome: Ongoing, Progressing

## 2022-09-03 NOTE — PLAN OF CARE
Problem: Adult Inpatient Plan of Care  Goal: Plan of Care Review  Outcome: Ongoing, Progressing  Goal: Patient-Specific Goal (Individualized)  Outcome: Ongoing, Progressing  Goal: Absence of Hospital-Acquired Illness or Injury  Outcome: Ongoing, Progressing  Goal: Optimal Comfort and Wellbeing  Outcome: Ongoing, Progressing     Problem: Feeding Intolerance (Enteral Nutrition)  Goal: Feeding Tolerance  Outcome: Ongoing, Not Progressing

## 2022-09-03 NOTE — PROGRESS NOTES
Patient seen and examined.  No significant changes.  He tried Glucerna tube feeds and had some crampy pain.  Other than that denies abdominal pain.    Vitals are stable   Abdomen soft, nontender     Labs reviewed.  Persistent leukopenia    No significant changes from surgical perspective.  Continue observation.  Continue antibiotics  Slow trial of tube feeds as tolerated.  Plan On reimaging tomorrow or Monday.

## 2022-09-03 NOTE — CARE UPDATE
09/03/22 0822   Patient Assessment/Suction   Level of Consciousness (AVPU) alert   Respiratory Effort Normal;Unlabored   Expansion/Accessory Muscles/Retractions expansion symmetric   All Lung Fields Breath Sounds clear   Rhythm/Pattern, Respiratory unlabored   PRE-TX-O2   O2 Device (Oxygen Therapy) room air   SpO2 97 %   Pulse Oximetry Type Intermittent   $ Pulse Oximetry - Multiple Charge Pulse Oximetry - Multiple   Pulse 75   Resp 18   Education   $ Education 15 min   Respiratory Evaluation   $ Care Plan Tech Time 15 min

## 2022-09-04 LAB
ABO + RH BLD: NORMAL
ALBUMIN SERPL BCP-MCNC: 2.5 G/DL (ref 3.5–5.2)
ALP SERPL-CCNC: 43 U/L (ref 55–135)
ALT SERPL W/O P-5'-P-CCNC: 11 U/L (ref 10–44)
ANION GAP SERPL CALC-SCNC: 5 MMOL/L (ref 8–16)
ANISOCYTOSIS BLD QL SMEAR: SLIGHT
AST SERPL-CCNC: 11 U/L (ref 10–40)
BASOPHILS # BLD AUTO: 0 K/UL (ref 0–0.2)
BASOPHILS NFR BLD: 0 % (ref 0–1.9)
BILIRUB SERPL-MCNC: 0.5 MG/DL (ref 0.1–1)
BLD GP AB SCN CELLS X3 SERPL QL: NORMAL
BUN SERPL-MCNC: 22 MG/DL (ref 8–23)
CALCIUM SERPL-MCNC: 7 MG/DL (ref 8.7–10.5)
CHLORIDE SERPL-SCNC: 106 MMOL/L (ref 95–110)
CO2 SERPL-SCNC: 19 MMOL/L (ref 23–29)
CREAT SERPL-MCNC: 0.7 MG/DL (ref 0.5–1.4)
CRP SERPL-MCNC: 7.71 MG/DL
DACRYOCYTES BLD QL SMEAR: ABNORMAL
DIFFERENTIAL METHOD: ABNORMAL
EOSINOPHIL # BLD AUTO: 0 K/UL (ref 0–0.5)
EOSINOPHIL NFR BLD: 2.3 % (ref 0–8)
ERYTHROCYTE [DISTWIDTH] IN BLOOD BY AUTOMATED COUNT: 22.7 % (ref 11.5–14.5)
EST. GFR  (NO RACE VARIABLE): >60 ML/MIN/1.73 M^2
FERRITIN SERPL-MCNC: 1030 NG/ML (ref 20–300)
GLUCOSE SERPL-MCNC: 146 MG/DL (ref 70–110)
GLUCOSE SERPL-MCNC: 148 MG/DL (ref 70–110)
GLUCOSE SERPL-MCNC: 150 MG/DL (ref 70–110)
GLUCOSE SERPL-MCNC: 184 MG/DL (ref 70–110)
GLUCOSE SERPL-MCNC: 193 MG/DL (ref 70–110)
HCT VFR BLD AUTO: 23.5 % (ref 40–54)
HGB BLD-MCNC: 7.6 G/DL (ref 14–18)
HYPOCHROMIA BLD QL SMEAR: ABNORMAL
IMM GRANULOCYTES # BLD AUTO: 0.04 K/UL (ref 0–0.04)
IMM GRANULOCYTES NFR BLD AUTO: 4.5 % (ref 0–0.5)
IRON SERPL-MCNC: 58 UG/DL (ref 45–160)
LDH SERPL L TO P-CCNC: 84 U/L (ref 110–260)
LYMPHOCYTES # BLD AUTO: 0.3 K/UL (ref 1–4.8)
LYMPHOCYTES NFR BLD: 34.1 % (ref 18–48)
MAGNESIUM SERPL-MCNC: 1.5 MG/DL (ref 1.6–2.6)
MCH RBC QN AUTO: 26.4 PG (ref 27–31)
MCHC RBC AUTO-ENTMCNC: 32.3 G/DL (ref 32–36)
MCV RBC AUTO: 82 FL (ref 82–98)
MONOCYTES # BLD AUTO: 0.1 K/UL (ref 0.3–1)
MONOCYTES NFR BLD: 15.9 % (ref 4–15)
NEUTROPHILS # BLD AUTO: 0.4 K/UL (ref 1.8–7.7)
NEUTROPHILS NFR BLD: 43.2 % (ref 38–73)
NRBC BLD-RTO: 0 /100 WBC
OB PNL STL: POSITIVE
OVALOCYTES BLD QL SMEAR: ABNORMAL
PHOSPHATE SERPL-MCNC: 1.4 MG/DL (ref 2.7–4.5)
PLATELET # BLD AUTO: 138 K/UL (ref 150–450)
PLATELET BLD QL SMEAR: ABNORMAL
PMV BLD AUTO: 10.3 FL (ref 9.2–12.9)
POIKILOCYTOSIS BLD QL SMEAR: SLIGHT
POLYCHROMASIA BLD QL SMEAR: ABNORMAL
POTASSIUM SERPL-SCNC: 2.9 MMOL/L (ref 3.5–5.1)
PROT SERPL-MCNC: 4.8 G/DL (ref 6–8.4)
RBC # BLD AUTO: 2.88 M/UL (ref 4.6–6.2)
SATURATED IRON: 26 % (ref 20–50)
SODIUM SERPL-SCNC: 130 MMOL/L (ref 136–145)
TOTAL IRON BINDING CAPACITY: 223 UG/DL (ref 250–450)
TRANSFERRIN SERPL-MCNC: 159 MG/DL (ref 200–375)
WBC # BLD AUTO: 0.88 K/UL (ref 3.9–12.7)

## 2022-09-04 PROCEDURE — 86850 RBC ANTIBODY SCREEN: CPT | Performed by: INTERNAL MEDICINE

## 2022-09-04 PROCEDURE — 99232 SBSQ HOSP IP/OBS MODERATE 35: CPT | Mod: ,,, | Performed by: STUDENT IN AN ORGANIZED HEALTH CARE EDUCATION/TRAINING PROGRAM

## 2022-09-04 PROCEDURE — 99232 PR SUBSEQUENT HOSPITAL CARE,LEVL II: ICD-10-PCS | Mod: ,,, | Performed by: STUDENT IN AN ORGANIZED HEALTH CARE EDUCATION/TRAINING PROGRAM

## 2022-09-04 PROCEDURE — 94761 N-INVAS EAR/PLS OXIMETRY MLT: CPT

## 2022-09-04 PROCEDURE — 85025 COMPLETE CBC W/AUTO DIFF WBC: CPT | Performed by: INTERNAL MEDICINE

## 2022-09-04 PROCEDURE — C9113 INJ PANTOPRAZOLE SODIUM, VIA: HCPCS | Performed by: INTERNAL MEDICINE

## 2022-09-04 PROCEDURE — 84466 ASSAY OF TRANSFERRIN: CPT | Performed by: INTERNAL MEDICINE

## 2022-09-04 PROCEDURE — 82728 ASSAY OF FERRITIN: CPT | Performed by: INTERNAL MEDICINE

## 2022-09-04 PROCEDURE — 82272 OCCULT BLD FECES 1-3 TESTS: CPT | Performed by: INTERNAL MEDICINE

## 2022-09-04 PROCEDURE — 63600175 PHARM REV CODE 636 W HCPCS: Performed by: INTERNAL MEDICINE

## 2022-09-04 PROCEDURE — 80053 COMPREHEN METABOLIC PANEL: CPT | Performed by: INTERNAL MEDICINE

## 2022-09-04 PROCEDURE — 83615 LACTATE (LD) (LDH) ENZYME: CPT | Performed by: INTERNAL MEDICINE

## 2022-09-04 PROCEDURE — 12000002 HC ACUTE/MED SURGE SEMI-PRIVATE ROOM

## 2022-09-04 PROCEDURE — 84100 ASSAY OF PHOSPHORUS: CPT | Performed by: INTERNAL MEDICINE

## 2022-09-04 PROCEDURE — 25000003 PHARM REV CODE 250: Performed by: INTERNAL MEDICINE

## 2022-09-04 PROCEDURE — 86140 C-REACTIVE PROTEIN: CPT | Performed by: SURGERY

## 2022-09-04 PROCEDURE — 83735 ASSAY OF MAGNESIUM: CPT | Performed by: INTERNAL MEDICINE

## 2022-09-04 RX ORDER — POTASSIUM CHLORIDE 7.45 MG/ML
20 INJECTION INTRAVENOUS ONCE
Status: COMPLETED | OUTPATIENT
Start: 2022-09-04 | End: 2022-09-04

## 2022-09-04 RX ADMIN — LEUCINE, PHENYLALANINE, LYSINE, METHIONINE, ISOLEUCINE, VALINE, HISTIDINE, THREONINE, TRYPTOPHAN, ALANINE, GLYCINE, ARGININE, PROLINE, SERINE, TYROSINE, DEXTROSE 2000 ML: 311; 238; 247; 170; 255; 247; 204; 179; 77; 880; 438; 489; 289; 213; 17; 5 INJECTION INTRAVENOUS at 05:09

## 2022-09-04 RX ADMIN — PIPERACILLIN SODIUM AND TAZOBACTAM SODIUM 3.38 G: 3; .375 INJECTION, POWDER, LYOPHILIZED, FOR SOLUTION INTRAVENOUS at 08:09

## 2022-09-04 RX ADMIN — POTASSIUM CHLORIDE 20 MEQ: 7.46 INJECTION, SOLUTION INTRAVENOUS at 08:09

## 2022-09-04 RX ADMIN — MAGNESIUM SULFATE HEPTAHYDRATE 2 G: 40 INJECTION, SOLUTION INTRAVENOUS at 05:09

## 2022-09-04 RX ADMIN — PANTOPRAZOLE SODIUM 40 MG: 40 INJECTION, POWDER, FOR SOLUTION INTRAVENOUS at 08:09

## 2022-09-04 RX ADMIN — POTASSIUM CHLORIDE 20 MEQ: 7.46 INJECTION, SOLUTION INTRAVENOUS at 07:09

## 2022-09-04 RX ADMIN — SODIUM PHOSPHATE, MONOBASIC, MONOHYDRATE 30 MMOL: 276; 142 INJECTION, SOLUTION INTRAVENOUS at 11:09

## 2022-09-04 RX ADMIN — LEUCINE, PHENYLALANINE, LYSINE, METHIONINE, ISOLEUCINE, VALINE, HISTIDINE, THREONINE, TRYPTOPHAN, ALANINE, GLYCINE, ARGININE, PROLINE, SERINE, TYROSINE, DEXTROSE 2000 ML: 311; 238; 247; 170; 255; 247; 204; 179; 77; 880; 438; 489; 289; 213; 17; 5 INJECTION INTRAVENOUS at 04:09

## 2022-09-04 RX ADMIN — PIPERACILLIN SODIUM AND TAZOBACTAM SODIUM 3.38 G: 3; .375 INJECTION, POWDER, LYOPHILIZED, FOR SOLUTION INTRAVENOUS at 03:09

## 2022-09-04 RX ADMIN — ENOXAPARIN SODIUM 40 MG: 40 INJECTION SUBCUTANEOUS at 04:09

## 2022-09-04 RX ADMIN — PIPERACILLIN SODIUM AND TAZOBACTAM SODIUM 3.38 G: 3; .375 INJECTION, POWDER, LYOPHILIZED, FOR SOLUTION INTRAVENOUS at 10:09

## 2022-09-04 RX ADMIN — INSULIN ASPART 1 UNITS: 100 INJECTION, SOLUTION INTRAVENOUS; SUBCUTANEOUS at 11:09

## 2022-09-04 RX ADMIN — POTASSIUM CHLORIDE 20 MEQ: 7.46 INJECTION, SOLUTION INTRAVENOUS at 01:09

## 2022-09-04 RX ADMIN — POTASSIUM CHLORIDE 20 MEQ: 7.46 INJECTION, SOLUTION INTRAVENOUS at 11:09

## 2022-09-04 RX ADMIN — LEVOTHYROXINE SODIUM ANHYDROUS 50 MCG: 100 INJECTION, POWDER, LYOPHILIZED, FOR SOLUTION INTRAVENOUS at 05:09

## 2022-09-04 RX ADMIN — POTASSIUM CHLORIDE 20 MEQ: 7.46 INJECTION, SOLUTION INTRAVENOUS at 05:09

## 2022-09-04 RX ADMIN — INSULIN ASPART 1 UNITS: 100 INJECTION, SOLUTION INTRAVENOUS; SUBCUTANEOUS at 08:09

## 2022-09-04 NOTE — CONSULTS
GASTROENTEROLOGY INPATIENT CONSULT NOTE  Patient Name: Cyrus Batres Jr.  Patient MRN: [id number]  Patient : 1950    Admit Date: 2022  Service date: 2022    Reason for Consult:  Vomiting nausea pneumatosis intestinalis    PCP: Maico Patterson MD    Chief Complaint   Patient presents with    Vomiting    Diarrhea    Weakness     X 1 WEEK, ACTIVE CHEMO       HPI: Patient is a 72 y.o. male with PMHx  history of laryngeal cancer who has undergone surgery followed by chemotherapy.  In addition has developed significant hypothyroidism with TSH being greater than 38 and his thyroid now being replaced intravenously.  He developed nausea vomiting intolerance of feedings.  Abdominal distension.  CT scan of the abdomen reveals pneumoc ptosis intestinali..  General surgery has been consulted and regular recommended supportive measures.  Follow-up CT scan.  IV fluids and Clindamax.  IV replacement thyroid.    Past Medical History:  Past Medical History:   Diagnosis Date    Allergy     pollen extracts    Atrial fibrillation     Chronic anticoagulation     Diabetes mellitus, type 2     Hypertension     Larynx neoplasm malignant 2020    Postoperative hypothyroidism 2022        Past Surgical History:  Past Surgical History:   Procedure Laterality Date    DIRECT LARYNGOBRONCHOSCOPY N/A 2021    Procedure: LARYNGOSCOPY, DIRECT, WITH BRONCHOSCOPY;  Surgeon: Flex Espinosa MD;  Location: [facility] OR [address] [place] [address];  Service: ENT;  Laterality: N/A;    DISSECTION OF NECK Bilateral 2022    Procedure: DISSECTION, NECK;  Surgeon: Jesse James MD;  Location: [facility] OR [address] [place] [address];  Service: ENT;  Laterality: Bilateral;    FLAP PROCEDURE Right 2022    Procedure: CREATION, FREE FLAP;  Surgeon: Alise Hart MD;  Location: [facility] OR [address] [place] [address];  Service: ENT;  Laterality: Right;  Ischemic start 1351  Ischemic stop 1502    INSERTION OF TUNNELED CENTRAL VENOUS CATHETER (CVC) WITH SUBCUTANEOUS PORT N/A 2022    Procedure:  [id number];  Surgeon: Jesus Viera MD;  Location: [facility];  Service: General;  Laterality: N/A;    LARYNGECTOMY N/A 1/6/2022    Procedure: LARYNGECTOMY;  Surgeon: Jesse James MD;  [facility];  Service: ENT;  Laterality: N/A;    LARYNGOSCOPY N/A 8/4/2020    Procedure: Suspension microlaryngoscopy with biopsy, possible KTP laser treatment/excision;  Surgeon: Stew Noel MD;  [facility];  Service: ENT;  Laterality: N/A;  Microscope, telescopes, tower, microinstruments, KTP laser, rep conf# [id number] IC 7/28.    LARYNGOSCOPY N/A 3/16/2021    Procedure: Suspension microlaryngoscopy with excision of lesion, possible CO2 laser;  Surgeon: Stew Noel MD;  [facility];  Service: ENT;  Laterality: N/A;  Microscope, telescopes, tower, microinstruments, CO2 laser, rep conf# [id number] IC 3/4.    LARYNGOSCOPY N/A 4/1/2021    Procedure: Suspension microlaryngoscopy with KTP laser excision of lesion;  Surgeon: Stew Noel MD;  [facility];  Service: ENT;  Laterality: N/A;  Microscope, telescopes, tower, microinstruments, 70 degree scope, vocal fold , KTP laser, rep conf# [id number] BC    LARYNGOSCOPY N/A 12/9/2021    Procedure: Suspension microlaryngoscopy with biopsy;  Surgeon: Stew Noel MD;  [facility];  Service: ENT;  Laterality: N/A;  Microscope, telescopes, tower, microinstruments    LARYNGOSCOPY N/A 1/6/2022    Procedure: LARYNGOSCOPY;  Surgeon: Jesse James MD;  [facility];  Service: ENT;  Laterality: N/A;    LARYNGOSCOPY N/A 4/27/2022    Procedure: LARYNGOSCOPY WITH BIOPSY;  Surgeon: Jesse James MD;  [facility];  Service: ENT;  Laterality: N/A;    MICROLARYNGOSCOPY N/A 3/17/2020    Procedure: MICROLARYNGOSCOPY;  Surgeon: Jung Xiao MD;  Location: [facility] OR;  Service: ENT;  Laterality: N/A;  Laser Microlaryngoscopy  NEED TO SCHEDULE LASER  from [facility] [id number]    REIMPLANTATION OF PARATHYROID TISSUE N/A 1/6/2022    Procedure: REIMPLANTATION, PARATHYROID TISSUE;  Surgeon: Jesse James MD;  Location: [facility] OR [address] [place] [address];  Service: ENT;  Laterality: N/A;    THYROIDECTOMY  1/6/2022    Procedure: THYROIDECTOMY;  Surgeon: Jesse James MD;  Location: [facility] OR [address] [place] [address];  Service: ENT;;    TRACHEOSTOMY N/A 12/27/2021    Procedure: CREATION, TRACHEOSTOMY;  Surgeon: Flex Espinosa MD;  Location: [facility] OR [address] [place] [address];  Service: ENT;  Laterality: N/A;        Home Medications:  Medications Prior to Admission   Medication Sig Dispense Refill Last Dose    dexAMETHasone (DECADRON) 4 MG Tab Take 2 tablets (8 mg total) by mouth once daily. Take as directed on days 2, 3, and 4 of your chemotherapy cycle. 24 tablet 2 8/31/2022 at 09:00    esomeprazole (NEXIUM) 40 MG capsule 40 mg before breakfast.   8/31/2022 at 09:00    guaiFENesin-codeine 100-10 mg/5 ml (TUSSI-ORGANIDIN NR)  mg/5 mL syrup 5 mLs by Per G Tube route 3 (three) times daily as needed for Cough. 354 mL 0 8/31/2022 at 21:00    levothyroxine (SYNTHROID) 100 MCG tablet Take 1 tablet (100 mcg total) by mouth before breakfast. 30 tablet 11 8/31/2022 at 08:00    losartan (COZAAR) 100 MG tablet TAKE 1 TABLET BY MOUTH EVERY DAY (Patient taking differently: Take 100 mg by mouth every evening.) 90 tablet 3 8/31/2022 at 21:00    metFORMIN (GLUCOPHAGE) 500 MG tablet Take 1 tablet (500 mg total) by mouth 2 (two) times daily with meals. 60 tablet 3 8/31/2022 at 18:00    OLANZapine (ZYPREXA) 5 MG tablet Take 1 tablet (5 mg total) by mouth every evening. Take as directed on days 1-4 of your chemotherapy cycle. 30 tablet 5 8/31/2022 at 21:00    traZODone (DESYREL) 50 MG tablet Take 1 tablet (50 mg total) by mouth nightly as needed for Insomnia. 30 tablet 2 8/31/2022 at 21:00    acetaminophen (TYLENOL) 325 MG tablet Take 325 mg by mouth every 6 (six) hours as needed for Pain.   Unknown    diphenhydrAMINE  (BENADRYL) 25 mg capsule Take 25 mg by mouth every 6 (six) hours as needed for Itching.   Unknown    nut.tx.gluc intol,lf,soy-fiber (GLUCERNA 1.5 TRISHA) 0.08-1.5 gram-kcal/mL Liqd 5 Cans by PEG Tube route 5 (five) times daily. 150 each 5 Unknown       Inpatient Medications:   enoxaparin  40 mg Subcutaneous Daily    levothyroxine  50 mcg Intravenous Daily    pantoprazole  40 mg Intravenous Daily    piperacillin-tazobactam (ZOSYN) IVPB  3.375 g Intravenous Q8H    potassium chloride  20 mEq Oral Daily    scopolamine  1 patch Transdermal Q3 Days     calcium chloride IVPB, calcium chloride IVPB, calcium chloride IVPB, calcium gluconate IVPB, calcium gluconate IVPB, calcium gluconate IVPB, dextrose 10%, dextrose 10%, hydrALAZINE, insulin aspart U-100, magnesium oxide, magnesium sulfate IVPB, magnesium sulfate IVPB, magnesium sulfate IVPB, magnesium sulfate IVPB, magnesium sulfate IVPB, magnesium sulfate IVPB, melatonin, morphine, ondansetron, potassium chloride **AND** potassium chloride **AND** potassium chloride, potassium chloride, potassium chloride, potassium chloride, potassium chloride, sodium phosphate IVPB, sodium phosphate IVPB, sodium phosphate IVPB, sodium phosphate IVPB, sodium phosphate IVPB, sodium phosphate IVPB, sodium phosphate IVPB, sodium phosphate IVPB    Review of patient's allergies indicates:   Allergen Reactions    Pollen extracts     Lovastatin Rash     Not confirmed but pt skeptical       Social History:   Social History     Occupational History    Occupation: [employer]     Employer: [employer]   Tobacco Use    Smoking status: Never    Smokeless tobacco: Never   Substance and Sexual Activity    Alcohol use: Not Currently     Comment: occasional    Drug use: No    Sexual activity: Yes     Partners: Female       Family History:   Family History   Problem Relation Age of Onset    Abnormal EKG Mother     Diabetes Father     Heart disease Father     Hypertension Father        Review of Systems:  A  "10 point review of systems was performed and was normal, except as mentioned in the HPI, including constitutional, HEENT, heme, lymph, cardiovascular, respiratory, gastrointestinal, genitourinary, neurologic, endocrine, psychiatric and musculoskeletal.      OBJECTIVE:    Physical Exam:  24 Hour Vital Sign Ranges: Temp:  [98.1 °F (36.7 °C)-99.1 °F (37.3 °C)] 98.1 °F (36.7 °C)  Pulse:  [66-82] 70  Resp:  [18] 18  SpO2:  [99 %-100 %] 100 %  BP: ()/(64-81) 111/64  Most recent vitals: /64   Pulse 70   Temp 98.1 °F (36.7 °C) (Oral)   Resp 18   Ht 5' 6" (1.676 m)   Wt 60.9 kg (134 lb 4.2 oz)   SpO2 100%   BMI 21.67 kg/m²    GEN:  Thin cachectic male in no acute distress  HEENT: PERRL, sclera anicteric, oral mucosa pink and moist without lesion  NECK: trachea midline; Good ROM  CV: regular rate and rhythm, no murmurs or gallops  RESP: clear to auscultation bilaterally, no wheezes, rhonci or rales  ABD: soft, non-tender, non-distended, normal bowel sounds mildly distended no rebound no guarding  EXT: no swelling or edema, 2+ pulses distally  SKIN: no rashes or jaundice  PSYCH: normal affect    Labs:   Recent Labs     09/02/22  0600 09/03/22  0345 09/04/22  0352   WBC 1.73* 1.29* 0.88*   MCV 79* 80* 82    150 138*     Recent Labs     09/03/22  0345 09/03/22  1415 09/04/22  0352   * 132* 130*   K 2.6* 3.3*  3.3* 2.9*    103 106   CO2 23 23 19*   BUN 19 19 22   * 156* 150*     No results for input(s): ALB in the last 72 hours.    Invalid input(s): ALKP, SGOT, SGPT, TBIL, DBIL, TPRO  No results for input(s): PT, INR, PTT in the last 72 hours.      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  Pneumatosis intestinalis likely related to his chemotherapy.  Or relative ischemia supportive measures have been instituted he seems to be improving.  Broad-spectrum antibiotics have been given.  Thyroid replacement has been given intravenously and this is necessary for him to regain intestinal function.  C difficile was ordered but stools have been solid in this order was canceled.  Agree with supportive measures.  Will follow along with you.  Lobito Thank you for this consult.    Abhishek Crow  9/4/2022  1:46 PM

## 2022-09-04 NOTE — PROGRESS NOTES
[facility] Medicine  Progress Note    Patient Name: Cyrus Batres Jr.  MRN: [id number]  Patient Class: IP- Inpatient   Admission Date: 9/1/2022  Length of Stay: 3 days  Attending Physician: Theron Mar MD  Primary Care Provider: Maico Patterson MD        Subjective:     Principal Problem:Pneumatosis intestinalis        HPI:  72 year old male with history of Laryngeal Ca, A fib, DM 2. HTN, Post-op Hypothyroidism, and has had 2/3 COVID vaccinations presented to ED complaining of generalized abdominal pain, nausea and vomiting, and diarrhea for the past 7-10 day worsening over the paswt 2 days. Pain: generalized 8/10 waxing and waning. Vomitus: anything he puts in PEG. Diarrhea: brown, no blood or black.     In ED: labs reviewed and noted below: minimal leukopenia with mild normocytic anemia (normal platelets); mild hyponatremia with normal renal function and prerenal azotemia; hepatic function is normal; lactate is elevated; markedly elevated TSH with below normal T4. CT Abdo: Pneumatosis intestinalis. CXR reviewed: NAPD. EKG reviewed: sinus with no acute segments.    Discussed with ED MD: Surgeon involved; will change medication to parental including l-thyroxine; start Clinamix currently until TPN can be initiated; C dif toxin review; AM labs for review; hydration for lactic acidosis; piperacillin      Overview/Hospital Course:  Patient was seen and examined  No fever and no abdominal pain  Abdominal exam is benign.  ANC 1.6.  Lactic acid back to the normal.  TSH elevation noted  CT abdominal findings noted.  Surgeon review noted  On exam patient is totally benign abdominal    9/3  Patient seen and examined  He did not tolerate tube feeding yesterday.  No more diarrhea.  On Clinimix   Patient report that he do better with plain yogurt in the tube and ordered.  K severely low and replacing with IV and p.o. in the fluid.  Abdominal exam is still benign    9/4   Worsen pancytopenia    and  placed reverse iso  Abdomen exam still benign      Interval History:     Review of Systems  Objective:     Vital Signs (Most Recent):  Temp: 98.1 °F (36.7 °C) (09/04/22 1121)  Pulse: 70 (09/04/22 1233)  Resp: 18 (09/04/22 1233)  BP: 111/64 (09/04/22 1121)  SpO2: 100 % (09/04/22 1233) Vital Signs (24h Range):  Temp:  [98.1 °F (36.7 °C)-99.1 °F (37.3 °C)] 98.1 °F (36.7 °C)  Pulse:  [66-82] 70  Resp:  [18] 18  SpO2:  [99 %-100 %] 100 %  BP: ()/(64-81) 111/64     Weight: 60.9 kg (134 lb 4.2 oz)  Body mass index is 21.67 kg/m².    Intake/Output Summary (Last 24 hours) at 9/4/2022 1643  Last data filed at 9/4/2022 1121  Gross per 24 hour   Intake --   Output 1600 ml   Net -1600 ml      Physical Exam  Constitutional:       General: He is not in acute distress.     Appearance: He is well-developed.   HENT:      Head: Normocephalic.   Eyes:      Pupils: Pupils are equal, round, and reactive to light.   Cardiovascular:      Rate and Rhythm: Normal rate and regular rhythm.   Pulmonary:      Effort: Pulmonary effort is normal. No respiratory distress.   Abdominal:      General: Abdomen is flat. There is no distension.      Tenderness: There is no abdominal tenderness.   Musculoskeletal:         General: Normal range of motion.      Cervical back: Neck supple.   Skin:     Findings: No rash.   Neurological:      Mental Status: He is alert and oriented to person, place, and time.   Psychiatric:         Mood and Affect: Mood normal.       Significant Labs: All pertinent labs within the past 24 hours have been reviewed.  BMP:   Recent Labs   Lab 09/04/22  0352   *   *   K 2.9*      CO2 19*   BUN 22   CREATININE 0.7   CALCIUM 7.0*   MG 1.5*     CBC:   Recent Labs   Lab 09/03/22  0345 09/04/22  0352   WBC 1.29* 0.88*   HGB 8.0* 7.6*   HCT 24.5* 23.5*    138*     CMP:   Recent Labs   Lab 09/03/22  0345 09/03/22  1415 09/04/22  0352   * 132* 130*   K 2.6* 3.3*  3.3* 2.9*    103 106   CO2 23  23 19*   * 156* 150*   BUN 19 19 22   CREATININE 0.7 0.7 0.7   CALCIUM 7.0* 7.3* 7.0*   PROT 4.7*  --  4.8*   ALBUMIN 2.5*  --  2.5*   BILITOT 0.7  --  0.5   ALKPHOS 46*  --  43*   AST 11  --  11   ALT 10  --  11   ANIONGAP 5* 6* 5*       Significant Imaging: I have reviewed all pertinent imaging results/findings within the past 24 hours.      Assessment/Plan:      Active Hospital Problems    Diagnosis    *Pneumatosis intestinalis    Nausea and vomiting    Larynx cancer     9/16/20-10/30/20 radiation to larynx and bilateral necks  1/6/22 TL with bilateral neck dissection and total thyroidectomy      Type 2 diabetes mellitus with hyperglycemia    Type 2 diabetes mellitus with hyperglycemia, without long-term current use of insulin    Hyperlipidemia    Paroxysmal atrial fibrillation     [facility] He was treated accordingly with antibiotics and breathing treatment. He was also found to have atrial fibrillation and was started on Eliquis.      Type 2 diabetes mellitus with diabetic arthropathy, with long-term current use of insulin     Pt has diabetes in 2016 after he had suffered pneumonia in the hospital.         PLAN   Trickle  tube feeding   clinimix   Observation as per surgeon , repeat CT abd tomorrow   Hold chemo   Oncology review and consulted . May nee neupogen   Monitor ANC.   Zosyn   Patient was on dexa at home along with chemo , if BP low , will start IV   IV synthroid half dose    replace K and repeat in the evening         VTE Risk Mitigation (From admission, onward)         Ordered     enoxaparin injection 40 mg  Daily         09/01/22 2201     IP VTE HIGH RISK PATIENT  Once         09/01/22 2201     Place sequential compression device  Until discontinued         09/01/22 2201                Discharge Planning   CHIARA: 9/5/2022     Code Status: Full Code   Is the patient medically ready for discharge?:     Reason  for patient still in hospital (select all that apply): Treatment  Discharge Plan A: Home with family                  Theron Mar MD  Department of Hospital Medicine   [facility]

## 2022-09-04 NOTE — PLAN OF CARE
Problem: Adult Inpatient Plan of Care  Goal: Plan of Care Review  Outcome: Ongoing, Progressing  Goal: Patient-Specific Goal (Individualized)  Outcome: Ongoing, Progressing     Problem: Diabetes Comorbidity  Goal: Blood Glucose Level Within Targeted Range  Outcome: Ongoing, Progressing     Problem: Skin Injury Risk Increased  Goal: Skin Health and Integrity  Outcome: Ongoing, Progressing     Problem: Feeding Intolerance (Enteral Nutrition)  Goal: Feeding Tolerance  Outcome: Ongoing, Not Progressing

## 2022-09-04 NOTE — PROGRESS NOTES
Patient seen and examined.  Still feels well.  Denies abdominal pain.      Abdomen soft, nontender    Labs reviewed.  Persistent neutropenia     Overall, clinical exam has been stable and benign.  CT scan was reordered for follow-up of pneumatosis.  If pneumatosis has resolved, okay to restart tube feeds more consistently and progressed slowly to goal as tolerated

## 2022-09-04 NOTE — SUBJECTIVE & OBJECTIVE
Interval History:     Review of Systems  Objective:     Vital Signs (Most Recent):  Temp: 98.1 °F (36.7 °C) (09/04/22 1121)  Pulse: 70 (09/04/22 1233)  Resp: 18 (09/04/22 1233)  BP: 111/64 (09/04/22 1121)  SpO2: 100 % (09/04/22 1233) Vital Signs (24h Range):  Temp:  [98.1 °F (36.7 °C)-99.1 °F (37.3 °C)] 98.1 °F (36.7 °C)  Pulse:  [66-82] 70  Resp:  [18] 18  SpO2:  [99 %-100 %] 100 %  BP: ()/(64-81) 111/64     Weight: 60.9 kg (134 lb 4.2 oz)  Body mass index is 21.67 kg/m².    Intake/Output Summary (Last 24 hours) at 9/4/2022 1643  Last data filed at 9/4/2022 1121  Gross per 24 hour   Intake --   Output 1600 ml   Net -1600 ml      Physical Exam  Constitutional:       General: He is not in acute distress.     Appearance: He is well-developed.   HENT:      Head: Normocephalic.   Eyes:      Pupils: Pupils are equal, round, and reactive to light.   Cardiovascular:      Rate and Rhythm: Normal rate and regular rhythm.   Pulmonary:      Effort: Pulmonary effort is normal. No respiratory distress.   Abdominal:      General: Abdomen is flat. There is no distension.      Tenderness: There is no abdominal tenderness.   Musculoskeletal:         General: Normal range of motion.      Cervical back: Neck supple.   Skin:     Findings: No rash.   Neurological:      Mental Status: He is alert and oriented to person, place, and time.   Psychiatric:         Mood and Affect: Mood normal.       Significant Labs: All pertinent labs within the past 24 hours have been reviewed.  BMP:   Recent Labs   Lab 09/04/22  0352   *   *   K 2.9*      CO2 19*   BUN 22   CREATININE 0.7   CALCIUM 7.0*   MG 1.5*     CBC:   Recent Labs   Lab 09/03/22  0345 09/04/22  0352   WBC 1.29* 0.88*   HGB 8.0* 7.6*   HCT 24.5* 23.5*    138*     CMP:   Recent Labs   Lab 09/03/22  0345 09/03/22  1415 09/04/22  0352   * 132* 130*   K 2.6* 3.3*  3.3* 2.9*    103 106   CO2 23 23 19*   * 156* 150*   BUN 19 19 22    CREATININE 0.7 0.7 0.7   CALCIUM 7.0* 7.3* 7.0*   PROT 4.7*  --  4.8*   ALBUMIN 2.5*  --  2.5*   BILITOT 0.7  --  0.5   ALKPHOS 46*  --  43*   AST 11  --  11   ALT 10  --  11   ANIONGAP 5* 6* 5*       Significant Imaging: I have reviewed all pertinent imaging results/findings within the past 24 hours.

## 2022-09-04 NOTE — PLAN OF CARE
09/04/22 1233   Patient Assessment/Suction   Level of Consciousness (AVPU) alert   Respiratory Effort Normal;Unlabored   All Lung Fields Breath Sounds clear   PRE-TX-O2   O2 Device (Oxygen Therapy) room air   SpO2 100 %   Pulse Oximetry Type Intermittent   $ Pulse Oximetry - Multiple Charge Pulse Oximetry - Multiple   Pulse 70   Resp 18     Pt cleaned and maintains his stoma.

## 2022-09-05 PROBLEM — D70.9 NEUTROPENIA: Status: ACTIVE | Noted: 2022-09-05

## 2022-09-05 LAB
ALBUMIN SERPL BCP-MCNC: 2.1 G/DL (ref 3.5–5.2)
ALP SERPL-CCNC: 42 U/L (ref 55–135)
ALT SERPL W/O P-5'-P-CCNC: 7 U/L (ref 10–44)
ANION GAP SERPL CALC-SCNC: 7 MMOL/L (ref 8–16)
ANISOCYTOSIS BLD QL SMEAR: SLIGHT
AST SERPL-CCNC: 16 U/L (ref 10–40)
BASOPHILS # BLD AUTO: 0 K/UL (ref 0–0.2)
BASOPHILS NFR BLD: 0 % (ref 0–1.9)
BILIRUB SERPL-MCNC: 0.4 MG/DL (ref 0.1–1)
BUN SERPL-MCNC: 12 MG/DL (ref 8–23)
BUN SERPL-MCNC: ABNORMAL MG/DL
CALCIUM SERPL-MCNC: 6.3 MG/DL (ref 8.7–10.5)
CHLORIDE SERPL-SCNC: 113 MMOL/L (ref 95–110)
CO2 SERPL-SCNC: 13 MMOL/L (ref 23–29)
CREAT SERPL-MCNC: 0.3 MG/DL (ref 0.5–1.4)
CREAT SERPL-MCNC: ABNORMAL MG/DL
DACRYOCYTES BLD QL SMEAR: ABNORMAL
DIFFERENTIAL METHOD: ABNORMAL
EOSINOPHIL # BLD AUTO: 0 K/UL (ref 0–0.5)
EOSINOPHIL NFR BLD: 2.2 % (ref 0–8)
ERYTHROCYTE [DISTWIDTH] IN BLOOD BY AUTOMATED COUNT: 23.4 % (ref 11.5–14.5)
EST. GFR  (NO RACE VARIABLE): >60 ML/MIN/1.73 M^2
GLUCOSE SERPL-MCNC: 151 MG/DL (ref 70–110)
GLUCOSE SERPL-MCNC: 184 MG/DL (ref 70–110)
GLUCOSE SERPL-MCNC: 193 MG/DL (ref 70–110)
GLUCOSE SERPL-MCNC: 240 MG/DL (ref 70–110)
GLUCOSE SERPL-MCNC: 242 MG/DL (ref 70–110)
HCT VFR BLD AUTO: 24.1 % (ref 40–54)
HGB BLD-MCNC: 8 G/DL (ref 14–18)
IMM GRANULOCYTES # BLD AUTO: 0.02 K/UL (ref 0–0.04)
IMM GRANULOCYTES NFR BLD AUTO: 2.2 % (ref 0–0.5)
LYMPHOCYTES # BLD AUTO: 0.4 K/UL (ref 1–4.8)
LYMPHOCYTES NFR BLD: 40 % (ref 18–48)
MAGNESIUM SERPL-MCNC: 1.2 MG/DL (ref 1.6–2.6)
MCH RBC QN AUTO: 26.8 PG (ref 27–31)
MCHC RBC AUTO-ENTMCNC: 33.2 G/DL (ref 32–36)
MCV RBC AUTO: 81 FL (ref 82–98)
MONOCYTES # BLD AUTO: 0.1 K/UL (ref 0.3–1)
MONOCYTES NFR BLD: 15.6 % (ref 4–15)
NEUTROPHILS # BLD AUTO: 0.4 K/UL (ref 1.8–7.7)
NEUTROPHILS NFR BLD: 40 % (ref 38–73)
NRBC BLD-RTO: 0 /100 WBC
OVALOCYTES BLD QL SMEAR: ABNORMAL
PHOSPHATE SERPL-MCNC: 1.2 MG/DL (ref 2.7–4.5)
PLATELET # BLD AUTO: 132 K/UL (ref 150–450)
PLATELET BLD QL SMEAR: ABNORMAL
PMV BLD AUTO: 9.3 FL (ref 9.2–12.9)
POIKILOCYTOSIS BLD QL SMEAR: SLIGHT
POLYCHROMASIA BLD QL SMEAR: ABNORMAL
POTASSIUM SERPL-SCNC: 3.2 MMOL/L (ref 3.5–5.1)
PROT SERPL-MCNC: 4.2 G/DL (ref 6–8.4)
RBC # BLD AUTO: 2.99 M/UL (ref 4.6–6.2)
SODIUM SERPL-SCNC: 133 MMOL/L (ref 136–145)
WBC # BLD AUTO: 0.9 K/UL (ref 3.9–12.7)

## 2022-09-05 PROCEDURE — 63600175 PHARM REV CODE 636 W HCPCS: Performed by: INTERNAL MEDICINE

## 2022-09-05 PROCEDURE — 99900035 HC TECH TIME PER 15 MIN (STAT)

## 2022-09-05 PROCEDURE — 99900031 HC PATIENT EDUCATION (STAT)

## 2022-09-05 PROCEDURE — 25000003 PHARM REV CODE 250: Performed by: INTERNAL MEDICINE

## 2022-09-05 PROCEDURE — 12000002 HC ACUTE/MED SURGE SEMI-PRIVATE ROOM

## 2022-09-05 PROCEDURE — 85025 COMPLETE CBC W/AUTO DIFF WBC: CPT | Performed by: INTERNAL MEDICINE

## 2022-09-05 PROCEDURE — 36415 COLL VENOUS BLD VENIPUNCTURE: CPT | Performed by: INTERNAL MEDICINE

## 2022-09-05 PROCEDURE — 80053 COMPREHEN METABOLIC PANEL: CPT | Performed by: INTERNAL MEDICINE

## 2022-09-05 PROCEDURE — 82565 ASSAY OF CREATININE: CPT | Performed by: INTERNAL MEDICINE

## 2022-09-05 PROCEDURE — C9113 INJ PANTOPRAZOLE SODIUM, VIA: HCPCS | Performed by: INTERNAL MEDICINE

## 2022-09-05 PROCEDURE — 99233 SBSQ HOSP IP/OBS HIGH 50: CPT | Mod: ,,, | Performed by: INTERNAL MEDICINE

## 2022-09-05 PROCEDURE — 83735 ASSAY OF MAGNESIUM: CPT | Performed by: INTERNAL MEDICINE

## 2022-09-05 PROCEDURE — 84100 ASSAY OF PHOSPHORUS: CPT | Performed by: INTERNAL MEDICINE

## 2022-09-05 PROCEDURE — 94761 N-INVAS EAR/PLS OXIMETRY MLT: CPT

## 2022-09-05 PROCEDURE — 63600175 PHARM REV CODE 636 W HCPCS: Mod: JG | Performed by: INTERNAL MEDICINE

## 2022-09-05 PROCEDURE — 99233 PR SUBSEQUENT HOSPITAL CARE,LEVL III: ICD-10-PCS | Mod: ,,, | Performed by: INTERNAL MEDICINE

## 2022-09-05 PROCEDURE — 82962 GLUCOSE BLOOD TEST: CPT

## 2022-09-05 PROCEDURE — 84520 ASSAY OF UREA NITROGEN: CPT | Performed by: INTERNAL MEDICINE

## 2022-09-05 RX ORDER — OLANZAPINE 5 MG/1
5 TABLET ORAL NIGHTLY
Status: DISCONTINUED | OUTPATIENT
Start: 2022-09-05 | End: 2022-09-05

## 2022-09-05 RX ORDER — LEVOTHYROXINE SODIUM 100 UG/1
100 TABLET ORAL
Status: DISCONTINUED | OUTPATIENT
Start: 2022-09-06 | End: 2022-09-06 | Stop reason: HOSPADM

## 2022-09-05 RX ADMIN — POTASSIUM CHLORIDE 60 MEQ: 7.46 INJECTION, SOLUTION INTRAVENOUS at 11:09

## 2022-09-05 RX ADMIN — LEUCINE, PHENYLALANINE, LYSINE, METHIONINE, ISOLEUCINE, VALINE, HISTIDINE, THREONINE, TRYPTOPHAN, ALANINE, GLYCINE, ARGININE, PROLINE, SERINE, TYROSINE, DEXTROSE 2000 ML: 311; 238; 247; 170; 255; 247; 204; 179; 77; 880; 438; 489; 289; 213; 17; 5 INJECTION INTRAVENOUS at 01:09

## 2022-09-05 RX ADMIN — LEVOTHYROXINE SODIUM ANHYDROUS 50 MCG: 100 INJECTION, POWDER, LYOPHILIZED, FOR SOLUTION INTRAVENOUS at 05:09

## 2022-09-05 RX ADMIN — FILGRASTIM-SNDZ 480 MCG: 480 INJECTION, SOLUTION INTRAVENOUS; SUBCUTANEOUS at 05:09

## 2022-09-05 RX ADMIN — POTASSIUM CHLORIDE 20 MEQ: 1500 TABLET, EXTENDED RELEASE ORAL at 09:09

## 2022-09-05 RX ADMIN — PIPERACILLIN SODIUM AND TAZOBACTAM SODIUM 3.38 G: 3; .375 INJECTION, POWDER, LYOPHILIZED, FOR SOLUTION INTRAVENOUS at 03:09

## 2022-09-05 RX ADMIN — PANTOPRAZOLE SODIUM 40 MG: 40 INJECTION, POWDER, FOR SOLUTION INTRAVENOUS at 11:09

## 2022-09-05 RX ADMIN — ENOXAPARIN SODIUM 40 MG: 40 INJECTION SUBCUTANEOUS at 05:09

## 2022-09-05 RX ADMIN — MORPHINE SULFATE 2 MG: 2 INJECTION, SOLUTION INTRAMUSCULAR; INTRAVENOUS at 10:09

## 2022-09-05 RX ADMIN — PIPERACILLIN SODIUM AND TAZOBACTAM SODIUM 3.38 G: 3; .375 INJECTION, POWDER, LYOPHILIZED, FOR SOLUTION INTRAVENOUS at 05:09

## 2022-09-05 RX ADMIN — INSULIN ASPART 2 UNITS: 100 INJECTION, SOLUTION INTRAVENOUS; SUBCUTANEOUS at 10:09

## 2022-09-05 RX ADMIN — MAGNESIUM SULFATE HEPTAHYDRATE 4 G: 40 INJECTION, SOLUTION INTRAVENOUS at 11:09

## 2022-09-05 RX ADMIN — INSULIN ASPART 2 UNITS: 100 INJECTION, SOLUTION INTRAVENOUS; SUBCUTANEOUS at 09:09

## 2022-09-05 NOTE — SUBJECTIVE & OBJECTIVE
Interval History:     As per subjective   Review of Systems  Objective:     Vital Signs (Most Recent):  Temp: 98.4 °F (36.9 °C) (09/05/22 0727)  Pulse: 93 (09/05/22 0727)  Resp: (P) 18 (09/05/22 1053)  BP: 125/76 (09/05/22 0727)  SpO2: 99 % (09/05/22 0727) Vital Signs (24h Range):  Temp:  [97.9 °F (36.6 °C)-98.7 °F (37.1 °C)] 98.4 °F (36.9 °C)  Pulse:  [68-98] 93  Resp:  [18] (P) 18  SpO2:  [99 %-100 %] 99 %  BP: (111-148)/(64-87) 125/76     Weight: 60.9 kg (134 lb 4.2 oz)  Body mass index is 21.67 kg/m².    Intake/Output Summary (Last 24 hours) at 9/5/2022 1101  Last data filed at 9/4/2022 2200  Gross per 24 hour   Intake 30 ml   Output 1000 ml   Net -970 ml      Physical Exam  Constitutional:       General: He is not in acute distress.     Appearance: He is well-developed.   HENT:      Head: Normocephalic.   Eyes:      Pupils: Pupils are equal, round, and reactive to light.   Cardiovascular:      Rate and Rhythm: Normal rate and regular rhythm.   Pulmonary:      Effort: Pulmonary effort is normal. No respiratory distress.   Abdominal:      General: There is no distension.      Tenderness: There is no abdominal tenderness.   Musculoskeletal:      Cervical back: Neck supple.   Skin:     Findings: No rash.   Neurological:      Mental Status: He is alert and oriented to person, place, and time.   Psychiatric:         Mood and Affect: Mood normal.       Significant Labs: All pertinent labs within the past 24 hours have been reviewed.  CBC:   Recent Labs   Lab 09/04/22  0352 09/05/22 0712   WBC 0.88* 0.90*   HGB 7.6* 8.0*   HCT 23.5* 24.1*   * 132*       Significant Imaging: I have reviewed all pertinent imaging results/findings within the past 24 hours.  I have reviewed and interpreted all pertinent imaging results/findings within the past 24 hours.

## 2022-09-05 NOTE — PROGRESS NOTES
[facility]  Adult Nutrition   Progress Note (Follow-Up)    SUMMARY      Recommendations:   1. Recommend advance Jevity 1.5 to goal rate 60 ml/hr as tolerated to provide 2160 kcal, 92 gm protein and 1094 ml free water.  2. Discontinue Clinimix @ 100 ml/hr as pt is tolerating > 50% needs via PEG feedings.  3. Continue to monitor labs and adjust feeding as needed.      Goals:   Goals: Patient to meet >75% of needs via nutrition support    Dietitian Rounds Brief  Patient with Jevity 1.5 infusing at 40 ml/hr per surgeon. Goal rate to meet needs with Jevity 1.5 @ 60 ml/hr to meet 100% estimated energy/protein needs. RN states Clinimix now d/cd. Patient lab values for magnesium and potassium are low and being treated with riders per RN. Patient noted to be on protonix. May consider change to zofran or phenergan as low magnesium is side-effect of protonix and may contribute to low potassium.    Diet order: NPO    Jevity 1.5 @ 40 ml/hrTF rate/provision: 1440 kcal,  61 gm protein, 730 ml free water.    % Intake of Estimated Energy Needs: 50 - 75 %  % Meal Intake: 50 - 75 %    Estimated/Assessed Needs  Weight Used For Calorie Calculations: 61 kg (134 lb 7.7 oz)  Energy Calorie Requirements (kcal): 1144-1186 (30-35)  Energy Need Method: Kcal/kg  Protein Requirements: 73-92gm (1.2-1.5gm/kg)  Weight Used For Protein Calculations: 61 kg (134 lb 7.7 oz)     Estimated Fluid Requirement Method: RDA Method  RDA Method (mL): 1830       Weight History:  Wt Readings from Last 5 Encounters:   09/01/22 60.9 kg (134 lb 4.2 oz)   08/26/22 62.2 kg (137 lb 1.6 oz)   08/22/22 60.1 kg (132 lb 9.6 oz)   08/22/22 59.8 kg (131 lb 13.4 oz)   08/19/22 62.6 kg (138 lb)        Reason for Assessment  Reason For Assessment: consult  Diagnosis:  (Pneumatosis intestinalis)  Relevant Medical History: Larynx cancer, DM2, HLD    Medications:Pertinent Medications Reviewed  Scheduled Meds:   enoxaparin  40 mg Subcutaneous Daily    levothyroxine   50 mcg Intravenous Daily    pantoprazole  40 mg Intravenous Daily    piperacillin-tazobactam (ZOSYN) IVPB  3.375 g Intravenous Q8H    potassium chloride  20 mEq Oral Daily    scopolamine  1 patch Transdermal Q3 Days     Continuous Infusions:   amino acid 4.25 % in D5W       PRN Meds:.calcium chloride IVPB, calcium chloride IVPB, calcium chloride IVPB, calcium gluconate IVPB, calcium gluconate IVPB, calcium gluconate IVPB, dextrose 10%, dextrose 10%, hydrALAZINE, insulin aspart U-100, magnesium oxide, magnesium sulfate IVPB, magnesium sulfate IVPB, magnesium sulfate IVPB, magnesium sulfate IVPB, magnesium sulfate IVPB, magnesium sulfate IVPB, melatonin, morphine, ondansetron, potassium chloride **AND** potassium chloride **AND** potassium chloride, potassium chloride, potassium chloride, potassium chloride, potassium chloride, sodium phosphate IVPB, sodium phosphate IVPB, sodium phosphate IVPB, sodium phosphate IVPB, sodium phosphate IVPB, sodium phosphate IVPB, sodium phosphate IVPB, sodium phosphate IVPB    Labs: Pertinent Labs Reviewed  Clinical Chemistry:  Recent Labs   Lab 09/01/22  1638 09/03/22  0345 09/04/22  0352 09/05/22  0452 09/05/22  0610   * 130* 130* 133*  --    K 3.5 2.6* 2.9* 3.2*  --    CL 94* 102 106 113*  --    CO2 22* 23 19* 13*  --    * 152* 150* 151*  --    BUN 35* 19 22 SEE COMMENT 12   CREATININE 0.8 0.7 0.7 SEE COMMENT 0.3*   CALCIUM 7.7* 7.0* 7.0* 6.3*  --    PROT 5.3* 4.7* 4.8* 4.2*  --    ALBUMIN 3.0* 2.5* 2.5* 2.1*  --    BILITOT 0.8 0.7 0.5 0.4  --    ALKPHOS 60 46* 43* 42*  --    AST 15 11 11 16  --    ALT 12 10 11 7*  --    ANIONGAP 10 5* 5* 7*  --    MG 1.4* 1.1* 1.5* 1.2*  --    PHOS 2.6* 1.2* 1.4* 1.2*  --    LIPASE 53  --   --   --   --     < > = values in this interval not displayed.     CBC:   Recent Labs   Lab 09/05/22  0712   WBC 0.90*   RBC 2.99*   HGB 8.0*   HCT 24.1*   *   MCV 81*   MCH 26.8*   MCHC 33.2     Lipid Panel:  Recent Labs   Lab 09/03/22  2531    TRIG 120     Inflammatory Labs:  Recent Labs   Lab 09/01/22 2039 09/02/22  0600 09/04/22  0352   CRP 0.81* 1.74* 7.71*     Thyroid & Parathyroid:  Recent Labs   Lab 09/01/22  1638   TSH 36.860*   FREET4 0.57*     Monitor and Evaluation  Food and Nutrient Intake: enteral nutrition intake, parenteral nutrition intake  Food and Nutrient Adminstration: enteral and parenteral nutrition administration  Physical Activity and Function: nutrition-related ADLs and IADLs  Anthropometric Measurements: weight change  Biochemical Data, Medical Tests and Procedures: electrolyte and renal panel, gastrointestinal profile, glucose/endocrine profile  Nutrition-Focused Physical Findings: overall appearance     Nutrition Risk  Level of Risk/Frequency of Follow-up: high     Nutrition Follow-Up  RD Follow-up?: Yes    Janel Lemons RD 09/05/2022 8:46 AM

## 2022-09-05 NOTE — CONSULTS
Chief complaint:  Worsening neutropenia     History present illness:   The patient is a 72-year-old white gentleman followed by my partner, Dr. 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  Patient is currently receiving palliative chemotherapy consisting of carboplatin/infusional 5 FU/pembrolizumab.  Patient did not receive prophylactic Neulasta in association with most recent cycle of therapy.  Patient is currently admitted to the Hospital Medicine Service after presenting with significant treatment associated diarrhea and pneumatosis intestinalis.  Since admission to the hospital, patient's difficulties with diarrhea and abdominal pain appear to have improved.  Interval CT scanning of the abdomen reveals improvement in patient's bowel findings.  However, patient has been developing worsening neutropenia for which consultation is requested.    Past medical history:  1.  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    2. More recently diagnosed left base of tongue carcinoma on therapy as outlined above   3. Allergic rhinitis  4.  Atrial fibrillation/chronic anticoagulation use   5. Type 2 diabetes mellitus  6.  Hypertension  7.  Acquired hypothyroidism  8.  Status post Port-A-Cath placement  9. Status post tracheostomy    Allergies:  1.  Pollen  2.  Lovastatin     Medications:    Current Facility-Administered Medications:     calcium chloride 1 g in dextrose 5 % 100 mL IVPB, 1 g, Intravenous, PRN, Evaristo Chappell MD    calcium chloride 1 g in dextrose 5 % 100 mL IVPB, 1 g, Intravenous, PRN, Evaristo Chappell MD    calcium chloride 1 g in dextrose 5 % 100 mL IVPB, 1 g, Intravenous, PRN, Evaristo Chappell MD    calcium gluconate 1 g in NS IVPB (premixed), 1 g, Intravenous, PRN, Rafiq Gaffney MD    calcium gluconate 1 g in NS IVPB  (premixed), 2 g, Intravenous, PRN, Rafiq Gaffney MD    calcium gluconate 1 g in NS IVPB (premixed), 3 g, Intravenous, PRN, Rafiq Gaffney MD    dextrose 10% bolus 125 mL, 12.5 g, Intravenous, PRN, Evaristo Chappell MD    dextrose 10% bolus 250 mL, 25 g, Intravenous, PRN, Evaristo Chappell MD    enoxaparin injection 40 mg, 40 mg, Subcutaneous, Daily, Evaristo Chappell MD, 40 mg at 09/04/22 1617    filgrastim-sndz (ZARXIO) injection 480 mcg/0.8 mL (Preferred Regimen), 480 mcg, Subcutaneous, Once, Hi Cardoso MD    hydrALAZINE injection 10 mg, 10 mg, Intravenous, Q6H PRN, Evaristo Chappell MD    insulin aspart U-100 pen 1-6 Units, 1-6 Units, Subcutaneous, PRN, Evaristo Chappell MD, 2 Units at 09/05/22 1058    [START ON 9/6/2022] levothyroxine tablet 100 mcg, 100 mcg, Oral, Before breakfast, Theron Mar MD    magnesium oxide tablet 800 mg, 800 mg, Oral, PRN, Evaristo Chappell MD    magnesium sulfate 2g in water 50mL IVPB (premix), 2 g, Intravenous, PRN, Evaristo Chappell MD    magnesium sulfate 2g in water 50mL IVPB (premix), 4 g, Intravenous, PRN, Evaristo Chappell MD, Stopped at 09/05/22 1302    magnesium sulfate 2g in water 50mL IVPB (premix), 2 g, Intravenous, PRN, Evaristo Chappell MD    magnesium sulfate 2g in water 50mL IVPB (premix), 2 g, Intravenous, PRN, Rafiq Gaffney MD, Stopped at 09/04/22 0743    magnesium sulfate 2g in water 50mL IVPB (premix), 4 g, Intravenous, PRN, Rafiq Gaffney MD    magnesium sulfate in dextrose IVPB (premix) 1 g, 1 g, Intravenous, PRN, Evaristo Chappell MD    melatonin tablet 6 mg, 6 mg, Oral, Nightly PRN, Evaristo Chappell MD    morphine injection 2 mg, 2 mg, Intravenous, Q4H PRN, Evaristo Chappell MD, 2 mg at 09/05/22 1053    ondansetron injection 4 mg, 4 mg, Intravenous, Q8H PRN, Evaristo Chappell MD    pantoprazole injection 40 mg, 40 mg, Intravenous, Daily, Evaristo Chappell MD, 40 mg at 09/05/22 1101    piperacillin-tazobactam 3.375 g in dextrose 5 % 50 mL IVPB  (ready to mix system), 3.375 g, Intravenous, Q8H, Evaristo Chappell MD, Stopped at 09/05/22 0755    potassium chloride 10 mEq in 100 mL IVPB, 40 mEq, Intravenous, PRN **AND** potassium chloride 10 mEq in 100 mL IVPB, 60 mEq, Intravenous, PRN, Last Rate: 100 mL/hr at 09/05/22 1103, 60 mEq at 09/05/22 1103 **AND** potassium chloride 10 mEq in 100 mL IVPB, 80 mEq, Intravenous, PRN, Rafiq Gaffney MD, Last Rate: 100 mL/hr at 09/04/22 1328, 20 mEq at 09/04/22 1328    potassium chloride SA CR tablet 20 mEq, 20 mEq, Oral, PRN, Evaristo Chappell MD    potassium chloride SA CR tablet 20 mEq, 20 mEq, Oral, PRN, Evaristo Chappell MD    potassium chloride SA CR tablet 20 mEq, 20 mEq, Oral, Daily, Theron Mar MD, 20 mEq at 09/05/22 0901    potassium chloride SA CR tablet 40 mEq, 40 mEq, Oral, PRN, Evaristo Chappell MD    potassium chloride SA CR tablet 40 mEq, 40 mEq, Oral, PRN, Evaristo Chappell MD    scopolamine 1.3-1.5 mg (1 mg over 3 days) 1 patch, 1 patch, Transdermal, Q3 Days, Rafiq Gaffney MD, 1 patch at 09/03/22 1500    sodium phosphate 15 mmol in dextrose 5 % 250 mL IVPB, 15 mmol, Intravenous, PRN, Evaristo Chappell MD    sodium phosphate 15 mmol in dextrose 5 % 250 mL IVPB, 15 mmol, Intravenous, PRN, Evaristo Chappell MD    sodium phosphate 15 mmol in dextrose 5 % 250 mL IVPB, 15 mmol, Intravenous, PRN, Rafiq Gaffney MD    sodium phosphate 20.01 mmol in dextrose 5 % 250 mL IVPB, 20.01 mmol, Intravenous, PRN, Evaristo Chappell MD    sodium phosphate 20.01 mmol in dextrose 5 % 250 mL IVPB, 20.01 mmol, Intravenous, PRN, Evaristo Chappell MD, Stopped at 09/03/22 1908    sodium phosphate 20.01 mmol in dextrose 5 % 250 mL IVPB, 20.01 mmol, Intravenous, PRN, Rafiq Gaffney MD    sodium phosphate 30 mmol in dextrose 5 % 250 mL IVPB, 30 mmol, Intravenous, PRN, Evaristo Chappell MD, Stopped at 09/04/22 1914    sodium phosphate 30 mmol in dextrose 5 % 250 mL IVPB, 30 mmol, Intravenous, PRN, Rafiq Gaffney MD      Family/social history:  Patient is a never smoker.  Patient does not use chew tobacco.    No current alcohol consumption.    Physical examination:   Well-developed, thin appearing, elderly, gentleman, in no acute distress.    VITAL SIGNS:  Patient is afebrile with a temp of 98.1°, blood pressure 112/73, pulse 74, respirations 18, oxygen saturation 97%.  HEENT:  Bitemporal wasting, atraumatic. Oral mucosa pink and moist. Lips without lesions. Tongue midline. Oropharynx clear. Nonicteric sclerae.   NECK:  Tracheostomy intact/functioning.  Surgical changes consistent with bilateral neck dissection.  Fibrosis of soft tissues of neck consistent with prior XRT.  HEART: Regular rate and rhythm without murmur, gallop or rub.   LUNGS: Clear to auscultation bilaterally. Normal respiratory effort.   ABDOMEN: Soft, nontender, nondistended with positive normoactive bowel sounds, no hepatosplenomegaly.  Gastrostomy tube intact/functioning.  EXTREMITIES: No cyanosis, clubbing or edema. Distal pulses are intact.  Increased bony prominences consistent with weight loss.  AXILLAE AND GROIN: No palpable pathologic lymphadenopathy is appreciated.   SKIN: Intact/turgor normal   NEUROLOGIC:  Generalized loss of muscle bulk.  No focal deficits appreciated.    Microbiology:  COVID-19 testing returned negative.    Blood cultures are no growth to date.    Stool for Clostridium difficile was canceled as patient's stools are not liquid.    Laboratory:   White count 0.9, hemoglobin 8, hematocrit 24.1, MCV 81, platelets 132, absolute neutrophil count 400.    Serum iron 58, TIBC 223, saturated iron 26%, ferritin 1030.    Sodium 132, potassium 3.2, chloride 113, CO2 13, BUN 12, creatinine 0.3, glucose 151, calcium 6.3, phosphorus 1.2, magnesium 1.2, albumin 2.1, alkaline phosphatase 42, total protein 4.2, total bilirubin 0.4, AST 16, ALT 7, GFR greater than 60.    LDH 84.    Urinalysis is remarkable for 3+ glucose, negative nitrites, negative  leukocyte esterase, no red cells, no white cells, no bacteria, no squamous cells.    Stool for occult blood positive.    CT scanning of the abdomen/pelvis without contrast:  - Pneumatosis and pericolonic gas about the ascending colon has decreased in volume compared to prior. Persistent colonic wall thickening which could indicate colitis.   - Coronary and aortic atherosclerosis.   - Gastrostomy tube is within the stomach.   - Bilateral inguinal hernias.     Impression:  1.  Left base of tongue carcinoma receiving palliative chemo/immunotherapy.    2.  History of squamous cell carcinoma larynx as detailed above.    3. Chemotherapy associated pancytopenia without fever.  4.  Gastrostomy tube status.  5.  Protein/calorie malnutrition.    Plan:  1. As patient did not receive prophylactic Neulasta, I will administer G-CSF at a dose of 480 mcg subQ daily until patient has improvement in neutropenia.  2.  Patient should be observed closely for the development of fever.  Should this happen, patient should have stat blood cultures, urine culture, repeat chest x-ray and be placed on broad-spectrum intravenous antibiotics consisting of Maxipime 2 g IV every 8 hours.  3.  Continue supplemental gastrostomy tube feedings.  4. No indication for intravenous/oral iron replacement as stores appear replete.    5. Would institute prophylactic Neulasta with future cycles of therapy to prevent katrina associated sepsis and/or treatment delay.    6. Would a add SHELLY for improvement of therapy related anemia once patient resumes palliative chemo/immunotherapy.      karen Kern.    This note was created using voice recognition software and may contain grammatical errors.

## 2022-09-05 NOTE — PROGRESS NOTES
[facility] Medicine  Progress Note    Patient Name: Cyrus Batres Jr.  MRN: [id number]  Patient Class: IP- Inpatient   Admission Date: 9/1/2022  Length of Stay: 4 days  Attending Physician: Theron Mar MD  Primary Care Provider: Maico Patterson MD        Subjective:     Principal Problem:Pneumatosis intestinalis        HPI:  72 year old male with history of Laryngeal Ca, A fib, DM 2. HTN, Post-op Hypothyroidism, and has had 2/3 COVID vaccinations presented to ED complaining of generalized abdominal pain, nausea and vomiting, and diarrhea for the past 7-10 day worsening over the paswt 2 days. Pain: generalized 8/10 waxing and waning. Vomitus: anything he puts in PEG. Diarrhea: brown, no blood or black.     In ED: labs reviewed and noted below: minimal leukopenia with mild normocytic anemia (normal platelets); mild hyponatremia with normal renal function and prerenal azotemia; hepatic function is normal; lactate is elevated; markedly elevated TSH with below normal T4. CT Abdo: Pneumatosis intestinalis. CXR reviewed: NAPD. EKG reviewed: sinus with no acute segments.    Discussed with ED MD: Surgeon involved; will change medication to parental including l-thyroxine; start Clinamix currently until TPN can be initiated; C dif toxin review; AM labs for review; hydration for lactic acidosis; piperacillin      Overview/Hospital Course:  Patient was seen and examined  No fever and no abdominal pain  Abdominal exam is benign.  ANC 1.6.  Lactic acid back to the normal.  TSH elevation noted  CT abdominal findings noted.  Surgeon review noted  On exam patient is totally benign abdominal    9/3  Patient seen and examined  He did not tolerate tube feeding yesterday.  No more diarrhea.  On Clinimix   Patient report that he do better with plain yogurt in the tube and ordered.  K severely low and replacing with IV and p.o. in the fluid.  Abdominal exam is still benign    9/4   Worsen pancytopenia    and  placed reverse iso  Abdomen exam still benign    9/5  Tolerating jevity   Abdomen benign . CT abdomen with improvements    and oncology ordered neupogen   Possible DC tomorrow       Interval History:     As per subjective   Review of Systems  Objective:     Vital Signs (Most Recent):  Temp: 98.4 °F (36.9 °C) (09/05/22 0727)  Pulse: 93 (09/05/22 0727)  Resp: (P) 18 (09/05/22 1053)  BP: 125/76 (09/05/22 0727)  SpO2: 99 % (09/05/22 0727) Vital Signs (24h Range):  Temp:  [97.9 °F (36.6 °C)-98.7 °F (37.1 °C)] 98.4 °F (36.9 °C)  Pulse:  [68-98] 93  Resp:  [18] (P) 18  SpO2:  [99 %-100 %] 99 %  BP: (111-148)/(64-87) 125/76     Weight: 60.9 kg (134 lb 4.2 oz)  Body mass index is 21.67 kg/m².    Intake/Output Summary (Last 24 hours) at 9/5/2022 1101  Last data filed at 9/4/2022 2200  Gross per 24 hour   Intake 30 ml   Output 1000 ml   Net -970 ml      Physical Exam  Constitutional:       General: He is not in acute distress.     Appearance: He is well-developed.   HENT:      Head: Normocephalic.   Eyes:      Pupils: Pupils are equal, round, and reactive to light.   Cardiovascular:      Rate and Rhythm: Normal rate and regular rhythm.   Pulmonary:      Effort: Pulmonary effort is normal. No respiratory distress.   Abdominal:      General: There is no distension.      Tenderness: There is no abdominal tenderness.   Musculoskeletal:      Cervical back: Neck supple.   Skin:     Findings: No rash.   Neurological:      Mental Status: He is alert and oriented to person, place, and time.   Psychiatric:         Mood and Affect: Mood normal.       Significant Labs: All pertinent labs within the past 24 hours have been reviewed.  CBC:   Recent Labs   Lab 09/04/22  0352 09/05/22 0712   WBC 0.88* 0.90*   HGB 7.6* 8.0*   HCT 23.5* 24.1*   * 132*       Significant Imaging: I have reviewed all pertinent imaging results/findings within the past 24 hours.  I have reviewed and interpreted all pertinent imaging results/findings  within the past 24 hours.      Assessment/Plan:      Active Hospital Problems    Diagnosis    *Pneumatosis intestinalis    Neutropenia    Nausea and vomiting    Larynx cancer     9/16/20-10/30/20 radiation to larynx and bilateral necks  1/6/22 TL with bilateral neck dissection and total thyroidectomy      Type 2 diabetes mellitus with hyperglycemia    Type 2 diabetes mellitus with hyperglycemia, without long-term current use of insulin    Hyperlipidemia    Paroxysmal atrial fibrillation     Patient was recently hospitalized with bilateral pneumonia at [facility]. He was treated accordingly with antibiotics and breathing treatment. He was also found to have atrial fibrillation and was started on Eliquis.      Type 2 diabetes mellitus with diabetic arthropathy, with long-term current use of insulin     Pt has diabetes in 2016 after he had suffered pneumonia in the hospital.         PLAN   Tolerating javity and set up outpatient tube feeding tomorrow with dietrician   clinimix to DC   repeat CT abd tomorrow with improvements of pneumatosis   Possible Hold next  chemo  . Oncology consulted   neupogen ordered   Monitor ANC.   Zosyn   Patient was on dexa at home along with chemo , if BP low , will start IV   Change to PO synthroid since tolerating PO    replace K as per sliding scale   SCD   Resume home meds via tube        VTE Risk Mitigation (From admission, onward)         Ordered     enoxaparin injection 40 mg  Daily         09/01/22 2201     IP VTE HIGH RISK PATIENT  Once         09/01/22 2201     Place sequential compression device  Until discontinued         09/01/22 2201                Discharge Planning   CHIARA: 9/5/2022     Code Status: Full Code   Is the patient medically ready for discharge?:     Reason for patient still in hospital (select all that apply): Treatment  Discharge Plan A: Home with family                  Theron Mar MD  Department of Hospital Medicine   [facility]  [facility]

## 2022-09-06 VITALS
WEIGHT: 134.25 LBS | DIASTOLIC BLOOD PRESSURE: 89 MMHG | BODY MASS INDEX: 21.57 KG/M2 | OXYGEN SATURATION: 100 % | SYSTOLIC BLOOD PRESSURE: 127 MMHG | HEART RATE: 81 BPM | TEMPERATURE: 98 F | HEIGHT: 66 IN | RESPIRATION RATE: 18 BRPM

## 2022-09-06 LAB
ALBUMIN SERPL BCP-MCNC: 2.8 G/DL (ref 3.5–5.2)
ALP SERPL-CCNC: 60 U/L (ref 55–135)
ALT SERPL W/O P-5'-P-CCNC: 13 U/L (ref 10–44)
ANION GAP SERPL CALC-SCNC: 8 MMOL/L (ref 8–16)
ANISOCYTOSIS BLD QL SMEAR: ABNORMAL
AST SERPL-CCNC: 13 U/L (ref 10–40)
BACTERIA BLD CULT: NORMAL
BACTERIA BLD CULT: NORMAL
BASOPHILS NFR BLD: 0 % (ref 0–1.9)
BILIRUB SERPL-MCNC: 0.6 MG/DL (ref 0.1–1)
BUN SERPL-MCNC: 15 MG/DL (ref 8–23)
CALCIUM SERPL-MCNC: 7.6 MG/DL (ref 8.7–10.5)
CHLORIDE SERPL-SCNC: 105 MMOL/L (ref 95–110)
CO2 SERPL-SCNC: 18 MMOL/L (ref 23–29)
CREAT SERPL-MCNC: 0.8 MG/DL (ref 0.5–1.4)
DIFFERENTIAL METHOD: ABNORMAL
DOHLE BOD BLD QL SMEAR: PRESENT
EOSINOPHIL NFR BLD: 2 % (ref 0–8)
ERYTHROCYTE [DISTWIDTH] IN BLOOD BY AUTOMATED COUNT: 24.6 % (ref 11.5–14.5)
EST. GFR  (NO RACE VARIABLE): >60 ML/MIN/1.73 M^2
GLUCOSE SERPL-MCNC: 182 MG/DL (ref 70–110)
GLUCOSE SERPL-MCNC: 205 MG/DL (ref 70–110)
GLUCOSE SERPL-MCNC: 218 MG/DL (ref 70–110)
HCT VFR BLD AUTO: 27 % (ref 40–54)
HGB BLD-MCNC: 8.6 G/DL (ref 14–18)
HYPOCHROMIA BLD QL SMEAR: ABNORMAL
IMM GRANULOCYTES # BLD AUTO: ABNORMAL K/UL (ref 0–0.04)
IMM GRANULOCYTES NFR BLD AUTO: ABNORMAL % (ref 0–0.5)
LYMPHOCYTES NFR BLD: 7 % (ref 18–48)
MAGNESIUM SERPL-MCNC: 1.7 MG/DL (ref 1.6–2.6)
MCH RBC QN AUTO: 26.4 PG (ref 27–31)
MCHC RBC AUTO-ENTMCNC: 31.9 G/DL (ref 32–36)
MCV RBC AUTO: 83 FL (ref 82–98)
METAMYELOCYTES NFR BLD MANUAL: 1 %
MONOCYTES NFR BLD: 20 % (ref 4–15)
MYELOCYTES NFR BLD MANUAL: 1 %
NEUTROPHILS NFR BLD: 30 % (ref 38–73)
NEUTS BAND NFR BLD MANUAL: 39 %
NRBC BLD-RTO: 3 /100 WBC
PHOSPHATE SERPL-MCNC: 1.1 MG/DL (ref 2.7–4.5)
PLATELET # BLD AUTO: 124 K/UL (ref 150–450)
PLATELET BLD QL SMEAR: ABNORMAL
PMV BLD AUTO: 9.3 FL (ref 9.2–12.9)
POLYCHROMASIA BLD QL SMEAR: ABNORMAL
POTASSIUM SERPL-SCNC: 3.7 MMOL/L (ref 3.5–5.1)
PROT SERPL-MCNC: 5.4 G/DL (ref 6–8.4)
RBC # BLD AUTO: 3.26 M/UL (ref 4.6–6.2)
SODIUM SERPL-SCNC: 131 MMOL/L (ref 136–145)
WBC # BLD AUTO: 2.01 K/UL (ref 3.9–12.7)

## 2022-09-06 PROCEDURE — 84100 ASSAY OF PHOSPHORUS: CPT | Performed by: INTERNAL MEDICINE

## 2022-09-06 PROCEDURE — 36415 COLL VENOUS BLD VENIPUNCTURE: CPT | Performed by: INTERNAL MEDICINE

## 2022-09-06 PROCEDURE — 80053 COMPREHEN METABOLIC PANEL: CPT | Performed by: INTERNAL MEDICINE

## 2022-09-06 PROCEDURE — 63600175 PHARM REV CODE 636 W HCPCS: Performed by: INTERNAL MEDICINE

## 2022-09-06 PROCEDURE — 94799 UNLISTED PULMONARY SVC/PX: CPT

## 2022-09-06 PROCEDURE — 25000003 PHARM REV CODE 250: Performed by: INTERNAL MEDICINE

## 2022-09-06 PROCEDURE — 85027 COMPLETE CBC AUTOMATED: CPT | Performed by: INTERNAL MEDICINE

## 2022-09-06 PROCEDURE — C9113 INJ PANTOPRAZOLE SODIUM, VIA: HCPCS | Performed by: INTERNAL MEDICINE

## 2022-09-06 PROCEDURE — 94761 N-INVAS EAR/PLS OXIMETRY MLT: CPT

## 2022-09-06 PROCEDURE — 99900035 HC TECH TIME PER 15 MIN (STAT)

## 2022-09-06 PROCEDURE — 83735 ASSAY OF MAGNESIUM: CPT | Performed by: INTERNAL MEDICINE

## 2022-09-06 PROCEDURE — 99900031 HC PATIENT EDUCATION (STAT)

## 2022-09-06 PROCEDURE — 85007 BL SMEAR W/DIFF WBC COUNT: CPT | Performed by: INTERNAL MEDICINE

## 2022-09-06 PROCEDURE — 63700000 PHARM REV CODE 250 ALT 637 W/O HCPCS: Performed by: INTERNAL MEDICINE

## 2022-09-06 RX ORDER — DIPHENOXYLATE HYDROCHLORIDE AND ATROPINE SULFATE 2.5; .025 MG/1; MG/1
1 TABLET ORAL 4 TIMES DAILY PRN
Qty: 21 TABLET | Refills: 0 | Status: SHIPPED | OUTPATIENT
Start: 2022-09-06 | End: 2022-09-16

## 2022-09-06 RX ORDER — PANCRELIPASE 24000; 76000; 120000 [USP'U]/1; [USP'U]/1; [USP'U]/1
1 CAPSULE, DELAYED RELEASE PELLETS ORAL
Qty: 21 CAPSULE | Refills: 0 | Status: SHIPPED | OUTPATIENT
Start: 2022-09-06 | End: 2022-10-13

## 2022-09-06 RX ORDER — POTASSIUM CHLORIDE 20 MEQ/15ML
20 SOLUTION ORAL ONCE
Status: COMPLETED | OUTPATIENT
Start: 2022-09-06 | End: 2022-09-06

## 2022-09-06 RX ORDER — HEPARIN 100 UNIT/ML
5 SYRINGE INTRAVENOUS ONCE
Status: COMPLETED | OUTPATIENT
Start: 2022-09-06 | End: 2022-09-06

## 2022-09-06 RX ORDER — SCOLOPAMINE TRANSDERMAL SYSTEM 1 MG/1
1 PATCH, EXTENDED RELEASE TRANSDERMAL
Qty: 10 PATCH | Refills: 0 | Status: SHIPPED | OUTPATIENT
Start: 2022-09-06 | End: 2022-10-13

## 2022-09-06 RX ORDER — METRONIDAZOLE 500 MG/1
500 TABLET ORAL 3 TIMES DAILY
Qty: 21 TABLET | Refills: 0 | Status: SHIPPED | OUTPATIENT
Start: 2022-09-06 | End: 2022-10-13 | Stop reason: ALTCHOICE

## 2022-09-06 RX ORDER — LEVOFLOXACIN 750 MG/1
750 TABLET ORAL DAILY
Qty: 7 TABLET | Refills: 0 | Status: SHIPPED | OUTPATIENT
Start: 2022-09-06 | End: 2022-10-13 | Stop reason: ALTCHOICE

## 2022-09-06 RX ADMIN — PIPERACILLIN SODIUM AND TAZOBACTAM SODIUM 3.38 G: 3; .375 INJECTION, POWDER, LYOPHILIZED, FOR SOLUTION INTRAVENOUS at 09:09

## 2022-09-06 RX ADMIN — PANTOPRAZOLE SODIUM 40 MG: 40 INJECTION, POWDER, FOR SOLUTION INTRAVENOUS at 09:09

## 2022-09-06 RX ADMIN — Medication 500 UNITS: at 03:09

## 2022-09-06 RX ADMIN — PIPERACILLIN SODIUM AND TAZOBACTAM SODIUM 3.38 G: 3; .375 INJECTION, POWDER, LYOPHILIZED, FOR SOLUTION INTRAVENOUS at 12:09

## 2022-09-06 RX ADMIN — POTASSIUM CHLORIDE 20 MEQ: 20 SOLUTION ORAL at 11:09

## 2022-09-06 RX ADMIN — LEVOTHYROXINE SODIUM 100 MCG: 0.1 TABLET ORAL at 05:09

## 2022-09-06 NOTE — DISCHARGE SUMMARY
[facility]  Discharge Summary      Patient Name: Cyrus Batres Jr.  MRN: [id number]  Patient Class: IP- Inpatient  Admission Date: 9/1/2022  Hospital Length of Stay: 5 days  Discharge Date and Time:  09/06/2022 3:05 PM  Attending Physician: Theron Mar MD   Discharging Provider: Theron Mar MD  Primary Care Provider: Maico Patterson MD      HPI:   72 year old male with history of Laryngeal Ca, A fib, DM 2. HTN, Post-op Hypothyroidism, and has had 2/3 COVID vaccinations presented to ED complaining of generalized abdominal pain, nausea and vomiting, and diarrhea for the past 7-10 day worsening over the paswt 2 days. Pain: generalized 8/10 waxing and waning. Vomitus: anything he puts in PEG. Diarrhea: brown, no blood or black.     In ED: labs reviewed and noted below: minimal leukopenia with mild normocytic anemia (normal platelets); mild hyponatremia with normal renal function and prerenal azotemia; hepatic function is normal; lactate is elevated; markedly elevated TSH with below normal T4. CT Abdo: Pneumatosis intestinalis. CXR reviewed: NAPD. EKG reviewed: sinus with no acute segments.    Discussed with ED MD: Surgeon involved; will change medication to parental including l-thyroxine; start Clinamix currently until TPN can be initiated; C dif toxin review; AM labs for review; hydration for lactic acidosis; piperacillin      Procedure(s) (LRB):  LAPAROTOMY, EXPLORATORY (N/A)      Hospital Course:   72-year-old male with previous history of laryngeal cancer status post PEG tube on tube feeding came in with abdominal pain, nausea, vomiting and mild diarrhea.  CT scan of the abdomen was done at admit with no other acute finding but found pneumatosis intestinalis and admitted and started on IV antibiotic.  Patient was seen by surgeon and no surgical intervention recommended.   C diff  was not done because of no more diarrhea during hospital stay.  He was not tolerant to Glucerna tube  feeding about a month and his wife was giving plain yogurt through the tube.  It was changed to Jevity and patient tolerated well and arranged at discharge.  Repeat CT scan was done with improvement of the findings and patient has  always almost totally benign abdomen.  Patient developed acute pancytopenia secondary to recent chemotherapy and hematology ordered Neupogen and patient ANC back to more than 1000 and discharged stable condition.  Levaquin and Flagyl for total of 7 days via PEG tube was ordered.  Expected to have mild diarrhea from the tube feeding and given Creon and Lomotil p.r.n. at discharge.  Arranged outpatient tube feeding with case management and follow recommendation of Nutrition.       Goals of Care Treatment Preferences:  Code Status: Full Code      Consults:   Consults (From admission, onward)        Status Ordering Provider     Inpatient consult to Registered Dietitian/Nutritionist  Once        Provider:  (Not yet assigned)    Completed NEY NUNES     Inpatient consult to Hematology Oncology  Once        Provider:  Hi Cardoso MD    Acknowledged NEY NUNES     Inpatient consult to Gastroenterology  Once        Provider:  Abhishek Crow MD    Completed NEY NUNES     Inpatient consult to Registered Dietitian/Nutritionist  Once        Provider:  (Not yet assigned)    Completed NEY NUNES     Inpatient consult to Registered Dietitian/Nutritionist  Once        Provider:  (Not yet assigned)    Completed NEY NUNES     Inpatient consult to General Surgery  Once        Provider:  Abdulaziz Le Jr., MD    Completed OBED VICTORIA     Inpatient consult to Hospitalist  Once        Provider:  (Not yet assigned)    Acknowledged OBED VICTORIA     Inpatient consult to General surgery  Once        Provider:  (Not yet assigned)    Completed OBED VICTORIA          No new Assessment & Plan notes have been filed under this hospital service since the last note was  generated.  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] 09/01/2022 Yes    Neutropenia [D70.9] 09/05/2022 Unknown    Nausea and vomiting [R11.2] 09/01/2022 Yes    Larynx cancer [C32.9] 08/04/2020 Yes    Type 2 diabetes mellitus with hyperglycemia [E11.65] 07/02/2019 Yes    Type 2 diabetes mellitus with hyperglycemia, without long-term current use of insulin [E11.65] 01/30/2019 Yes    Hyperlipidemia [E78.5] 07/19/2018 Yes     Chronic    Paroxysmal atrial fibrillation [I48.0] 07/03/2017 Yes     Chronic    Type 2 diabetes mellitus with diabetic arthropathy, with long-term current use of insulin [E11.618, Z79.4] 07/03/2017 Not Applicable     Chronic      Problems Resolved During this Admission:       Discharged Condition: good    Disposition: Home or Self Care    Follow Up:   Follow-up Information     Maico Patterson MD Follow up in 1 month(s).    Specialty: Internal Medicine  Contact information:  [address]  [address]  [place] [address]  [phone]             Luis Francis MD Follow up in 1 week(s).    Specialty: General Surgery  Contact information:  [address]  [address]  [place] [address]  [phone]             Aurash Khoobehi, MD Follow up in 1 week(s).    Specialties: Hematology and Oncology, Hematology  Contact information:  [address]  [place] [address]  [phone]                       Patient Instructions:      Ambulatory referral/consult to Outpatient Case Management   Referral Priority: Routine Referral Type: Consultation   Referral Reason: Specialty Services Required   Number of Visits Requested: 1     Tube Feedings/Formulas   Order Comments: To meet needs: Jevity 1.5 @ 60 ml/ hour goal rate as tolerated (2160 kcal, 92 gm protein, 1094 ml free water)  To meet fluid requirements, patient will need at least an addition 735 ml additional free water. Suggest 125 ml every 4 hours (750 ml).      Order Specific Question Answer Comments   Select Adult Formula: Other    Route: Gastrostomy      Activity as tolerated       Significant Diagnostic Studies: Labs:   CMP   Recent Labs   Lab 09/05/22  0452 09/05/22  0610 09/06/22  0539   *  --  131*   K 3.2*  --  3.7   *  --  105   CO2 13*  --  18*   *  --  205*   BUN SEE COMMENT 12 15   CREATININE SEE COMMENT 0.3* 0.8   CALCIUM 6.3*  --  7.6*   PROT 4.2*  --  5.4*   ALBUMIN 2.1*  --  2.8*   BILITOT 0.4  --  0.6   ALKPHOS 42*  --  60   AST 16  --  13   ALT 7*  --  13   ANIONGAP 7*  --  8    and CBC   Recent Labs   Lab 09/05/22  0712 09/06/22  0539   WBC 0.90* 2.01*   HGB 8.0* 8.6*   HCT 24.1* 27.0*   * 124*       Pending Diagnostic Studies:     None         Medications:  Reconciled Home Medications:      Medication List      START taking these medications    CREON 24,000-76,000 -120,000 unit capsule  Generic drug: lipase-protease-amylase 24,000-76,000-120,000 units  Take 1 capsule by mouth 3 (three) times daily with meals. for 7 days     diphenoxylate-atropine 2.5-0.025 mg 2.5-0.025 mg per tablet  Commonly known as: LOMOTIL  Take 1 tablet by mouth 4 (four) times daily as needed for Diarrhea.     levoFLOXacin 750 MG tablet  Commonly known as: LEVAQUIN  1 tablet (750 mg total) by Per G Tube route once daily.     metroNIDAZOLE 500 MG tablet  Commonly known as: FLAGYL  1 tablet (500 mg total) by Per G Tube route 3 (three) times daily.        CHANGE how you take these medications    losartan 100 MG tablet  Commonly known as: COZAAR  TAKE 1 TABLET BY MOUTH EVERY DAY  What changed: when to take this        CONTINUE taking these medications    acetaminophen 325 MG tablet  Commonly known as: TYLENOL  Take 325 mg by mouth every 6 (six) hours as needed for Pain.     dexAMETHasone 4 MG Tab  Commonly known as: DECADRON  Take 2 tablets (8 mg total) by mouth once daily. Take as directed on days 2, 3, and 4 of your chemotherapy cycle.     diphenhydrAMINE  25 mg capsule  Commonly known as: BENADRYL  Take 25 mg by mouth every 6 (six) hours as needed for Itching.     esomeprazole 40 MG capsule  Commonly known as: NEXIUM  40 mg before breakfast.     GLUCERNA 1.5 TRISHA 0.08-1.5 gram-kcal/mL Liqd  Generic drug: nut.tx.gluc intol,lf,soy-fiber  5 Cans by PEG Tube route 5 (five) times daily.     guaiFENesin-codeine 100-10 mg/5 ml  mg/5 mL syrup  Commonly known as: TUSSI-ORGANIDIN NR  5 mLs by Per G Tube route 3 (three) times daily as needed for Cough.     levothyroxine 100 MCG tablet  Commonly known as: SYNTHROID  Take 1 tablet (100 mcg total) by mouth before breakfast.     metFORMIN 500 MG tablet  Commonly known as: GLUCOPHAGE  Take 1 tablet (500 mg total) by mouth 2 (two) times daily with meals.     OLANZapine 5 MG tablet  Commonly known as: ZyPREXA  Take 1 tablet (5 mg total) by mouth every evening. Take as directed on days 1-4 of your chemotherapy cycle.     scopolamine 1.3-1.5 mg (1 mg over 3 days)  Commonly known as: TRANSDERM-SCOP  Place 1 patch onto the skin every 72 hours.     traZODone 50 MG tablet  Commonly known as: DESYREL  Take 1 tablet (50 mg total) by mouth nightly as needed for Insomnia.        STOP taking these medications    amoxicillin-clavulanate 200-28.5 mg/5 mL Susr  Commonly known as: AUGMENTIN     aspirin 81 MG EC tablet  Commonly known as: ECOTRIN     cefdinir 250 mg/5 mL suspension  Commonly known as: OMNICEF     ondansetron 8 MG Tbdl  Commonly known as: ZOFRAN-ODT     prednisone 5 mg/5 mL Soln            Indwelling Lines/Drains at time of discharge:   Lines/Drains/Airways     Central Venous Catheter Line  Duration           Port A Cath Single Lumen 06/09/22 89 days          Drain  Duration                Gastrostomy/Enterostomy 12/27/21 1152 Percutaneous endoscopic gastrostomy (PEG)  days         Gastrostomy/Enterostomy 06/09/22 1935 midline feeding 88 days          Airway  Duration                Laryngectomy (Do Not Remove) 06/09/22  1414 Laryngectomy stoma 89 days              General: Patient resting comfortably in no acute distress. Appears as stated age. Calm  Eyes: EOM intact. No conjunctivae injection. No scleral icterus.  ENT: Hearing grossly intact. No discharge from ears. No nasal discharge.   CVS: RRR. No LE edema BL.  Lungs: . No accessory muscle use. No acute respiratory distress  Neuro: non focal , Follows commands. Responds appropriately  Time spent on the discharge of patient: 33 minutes         Theron Mar MD  Department of Hospital Medicine  [facility]

## 2022-09-06 NOTE — CARE UPDATE
09/06/22 0731   Patient Assessment/Suction   Level of Consciousness (AVPU) alert   Respiratory Effort Unlabored   Expansion/Accessory Muscles/Retractions no use of accessory muscles;expansion symmetric   All Lung Fields Breath Sounds clear   Rhythm/Pattern, Respiratory depth regular;pattern regular;unlabored   Cough Frequency infrequent   Cough Type good   PRE-TX-O2   O2 Device (Oxygen Therapy) room air   SpO2 100 %   Pulse Oximetry Type Intermittent   $ Pulse Oximetry - Multiple Charge Pulse Oximetry - Multiple   Pulse 86   Resp 18   Temp 98.1 °F (36.7 °C)   /77        Laryngectomy (Do Not Remove) 06/09/22 1414 Laryngectomy stoma   Placement Date/Time: 06/09/22 1414   Present Prior to Hospital Arrival?: Yes  Type: Laryngectomy stoma   Status HME In Use   Site Assessment Dry;Clean   Site Care Dried;Cleansed   Ties Assessment Not needed   Suction set is at the bedside? Yes   Education   $ Education 15 min;Trach Care   Respiratory Evaluation   $ Care Plan Tech Time 15 min   $ Eval/Re-eval Charges Evaluation   Evaluation For   (care plan)

## 2022-09-06 NOTE — CARE UPDATE
09/05/22 1940   Patient Assessment/Suction   Level of Consciousness (AVPU) alert   Respiratory Effort Normal;Unlabored   Expansion/Accessory Muscles/Retractions no use of accessory muscles   All Lung Fields Breath Sounds Anterior:;clear   Rhythm/Pattern, Respiratory unlabored;no shortness of breath reported   Cough Frequency infrequent   Suction Method   (pt felt he did not need suctioning at this time)   PRE-TX-O2   O2 Device (Oxygen Therapy) room air   SpO2 98 %   Pulse Oximetry Type Intermittent   $ Pulse Oximetry - Multiple Charge Pulse Oximetry - Multiple   Pulse 74   Resp 18   Education   $ Education Other (see comment);15 min   Respiratory Evaluation   $ Care Plan Tech Time 15 min   Pt does his own trach care, and does need anything at this time.

## 2022-09-06 NOTE — PLAN OF CARE
Problem: Feeding Intolerance (Enteral Nutrition)  Goal: Feeding Tolerance  Outcome: Ongoing, Progressing  Intervention: Prevent and Manage Feeding Intolerance  Flowsheets (Taken 9/6/2022 8843)  Nutrition Support Management: (To meet needs: Jevity 1.5 @ 60 ml/hr goal rate as tolerated (2160 kcal, 92 gm protein, 1094 ml free water)  To meet fluid requirements, patient will need at least an addition 735 ml free water suggest 125 ml every 4 hrs (750 ml).   tube feeding rate increased   other (see comments)

## 2022-09-06 NOTE — PLAN OF CARE
[facility]  Discharge Final Note    Primary Care Provider: Maico Patterson MD    Expected Discharge Date: 9/6/2022     met with Pt at bedside to confirm Pt's discharge plans. Pt unable to speak. Pt's spouse (Janel Batres (Spouse) [phone] (Mobile) was near nurses' station and volunteered to speak with . Pt's spouse verbalized plan for Pt to discharge home with tube feeding formula through [facility] ([phone]).  confirmed with Pt's spouse Pt did not want start services with [facility]. Pt's spouse confirmed she desires Pt to continue with [facility].  called [facility] and spoke to Vamsi ([phone]) who verbalized Pt could receive a isela kangaroo pump which would require less maintenance and care by Pt, but would need to be serviced by home health.  informed Pt's spouse and Pt's spouse declined isela kangaroo pump and home health. Pt's spouse opted for bolus to maintain Pt's mobility outside of the home.  notified MD to edit orders and RD to update feeding instructions.  informed Pt's spouse of change to delivery method.    Case management director informed  [facility] will be able to provide feedings for Pt tonight, 9/6/2022.     called Pt's spouse ([phone] ) to verify delivery of formula. Pt's spouse confirmed Pt has received formula delivery and does not require any more equipment. Pt's spouse verbalized she also received pump equipment and would like to return it.  received secure chat message from Elisabeth ([facility]) inquiring about Pt's refusing pump.  conveyed Pt's and Pt's spouses wishes to return the pump equipment.     emailed case  to aid in scheduling Pt with [facility] PCP per protocol. Pt has  no other needs to be addressed by case management. Pt cleared to discharge by case management.    Final Discharge Note (most recent)       Final Note - 09/06/22 1630          Final Note    Assessment Type Final Discharge Note     Anticipated Discharge Disposition Home or Self Care     What phone number can be called within the next 1-3 days to see how you are doing after discharge? [phone]     Hospital Resources/Appts/Education Provided Provided patient/caregiver with written discharge plan information;Appointments scheduled by Navigator/Coordinator        Post-Acute Status    Post-Acute Authorization IV Infusion;Home Health     Home Health Status Patient declined/refused     Coverage [employer] MANAGED MEDICARE - [employer] MEDICARE HMO     IV Infusion Status Set-up Complete/Auth obtained     Discharge Delays None known at this time                     Important Message from Medicare  Important Message from Medicare regarding Discharge Appeal Rights: Given to patient/caregiver, Explained to patient/caregiver, Signed/date by patient/caregiver     Date IMM was signed: 09/06/22  Time IMM was signed: 1420    Contact Info       Maico Patterson MD   Specialty: Internal Medicine   Relationship: PCP - General    [address]  [address]  [place] [address]   Phone: [phone]       Next Steps: Follow up in 1 month(s)    Luis Francis MD   Specialty: General Surgery    [address]  [address]  [place] [address]   Phone: [phone]       Next Steps: Follow up in 1 week(s)    Aurash Khoobehi, MD   Specialty: Hematology and Oncology, Hematology    [address]  [place] [address]   Phone: [phone]       Next Steps: Follow up in 1 week(s)

## 2022-09-06 NOTE — CONSULTS
RD Recommendations for home enteral order change per patient feeding method prior to admit as requested.  CM to have MD re-order and to inform home health pharmacy of the order below:    To meet needs: Bolus 1 container Jevity 1.5 6 times / day (2160 kcal, 92 gm protein, 1094 ml free water)  To meet fluid requirements, patient will need at least an addition 735 ml additional free water.   Suggest 120 ml every Bolus feeding (750 ml).

## 2022-09-06 NOTE — PLAN OF CARE
[facility]    Spoke with Marco BROUSSARD. Orders noted for  Jevity 1.5 @ 60 ml/hr via isela kangaroo pump with 125 ml of free water every 4 hours. Able to dispense flush and feed bag via kangaroo pump. Marco to arrange HH for education. Plan to d/c today. Feeding and supplies to be delivered this evening.     [facility]  Vamsi Abarca Rn, Clinical Educator  Cell [phone]  Office [phone]  Fax [phone]

## 2022-09-06 NOTE — PLAN OF CARE
0950 -  [facility].  compiled referral for [facility] and faxed via rightfax to Justyna (Carly, P: [phone]; F:[phone]).

## 2022-09-07 ENCOUNTER — OUTPATIENT CASE MANAGEMENT (OUTPATIENT)
Dept: ADMINISTRATIVE | Facility: OTHER | Age: 72
End: 2022-09-07
Payer: MEDICARE

## 2022-09-09 ENCOUNTER — INFUSION (OUTPATIENT)
Dept: INFUSION THERAPY | Facility: HOSPITAL | Age: 72
End: 2022-09-09
Attending: STUDENT IN AN ORGANIZED HEALTH CARE EDUCATION/TRAINING PROGRAM
Payer: MEDICARE

## 2022-09-09 ENCOUNTER — PATIENT MESSAGE (OUTPATIENT)
Dept: ADMINISTRATIVE | Facility: OTHER | Age: 72
End: 2022-09-09
Payer: MEDICARE

## 2022-09-09 VITALS
HEART RATE: 87 BPM | TEMPERATURE: 97 F | DIASTOLIC BLOOD PRESSURE: 62 MMHG | HEIGHT: 66 IN | SYSTOLIC BLOOD PRESSURE: 110 MMHG | RESPIRATION RATE: 18 BRPM | BODY MASS INDEX: 21.44 KG/M2 | WEIGHT: 133.38 LBS | OXYGEN SATURATION: 100 %

## 2022-09-09 DIAGNOSIS — C32.0 MALIGNANT NEOPLASM OF TRUE VOCAL CORD: ICD-10-CM

## 2022-09-09 DIAGNOSIS — D50.0 IRON DEFICIENCY ANEMIA DUE TO CHRONIC BLOOD LOSS: Primary | ICD-10-CM

## 2022-09-09 LAB
ALBUMIN SERPL BCP-MCNC: 3.3 G/DL (ref 3.5–5.2)
ALP SERPL-CCNC: 86 U/L (ref 55–135)
ALT SERPL W/O P-5'-P-CCNC: 10 U/L (ref 10–44)
ANION GAP SERPL CALC-SCNC: 8 MMOL/L (ref 8–16)
ANISOCYTOSIS BLD QL SMEAR: ABNORMAL
AST SERPL-CCNC: 18 U/L (ref 10–40)
BASOPHILS NFR BLD: 0 % (ref 0–1.9)
BILIRUB SERPL-MCNC: 0.5 MG/DL (ref 0.1–1)
BUN SERPL-MCNC: 19 MG/DL (ref 8–23)
CALCIUM SERPL-MCNC: 8.5 MG/DL (ref 8.7–10.5)
CHLORIDE SERPL-SCNC: 101 MMOL/L (ref 95–110)
CO2 SERPL-SCNC: 23 MMOL/L (ref 23–29)
CREAT SERPL-MCNC: 0.8 MG/DL (ref 0.5–1.4)
DACRYOCYTES BLD QL SMEAR: ABNORMAL
DIFFERENTIAL METHOD: ABNORMAL
DOHLE BOD BLD QL SMEAR: PRESENT
EOSINOPHIL NFR BLD: 0 % (ref 0–8)
ERYTHROCYTE [DISTWIDTH] IN BLOOD BY AUTOMATED COUNT: 27.2 % (ref 11.5–14.5)
EST. GFR  (NO RACE VARIABLE): >60 ML/MIN/1.73 M^2
GLUCOSE SERPL-MCNC: 191 MG/DL (ref 70–110)
HCT VFR BLD AUTO: 26.4 % (ref 40–54)
HGB BLD-MCNC: 8.6 G/DL (ref 14–18)
IMM GRANULOCYTES # BLD AUTO: ABNORMAL K/UL (ref 0–0.04)
IMM GRANULOCYTES NFR BLD AUTO: ABNORMAL % (ref 0–0.5)
LYMPHOCYTES NFR BLD: 9 % (ref 18–48)
MCH RBC QN AUTO: 27.7 PG (ref 27–31)
MCHC RBC AUTO-ENTMCNC: 32.6 G/DL (ref 32–36)
MCV RBC AUTO: 85 FL (ref 82–98)
METAMYELOCYTES NFR BLD MANUAL: 1 %
MONOCYTES NFR BLD: 8 % (ref 4–15)
MYELOCYTES NFR BLD MANUAL: 2 %
NEUTROPHILS NFR BLD: 74 % (ref 38–73)
NEUTS BAND NFR BLD MANUAL: 3 %
NRBC BLD-RTO: 1 /100 WBC
OVALOCYTES BLD QL SMEAR: ABNORMAL
PLATELET # BLD AUTO: 122 K/UL (ref 150–450)
PMV BLD AUTO: 9.6 FL (ref 9.2–12.9)
POIKILOCYTOSIS BLD QL SMEAR: SLIGHT
POLYCHROMASIA BLD QL SMEAR: ABNORMAL
POTASSIUM SERPL-SCNC: 4.1 MMOL/L (ref 3.5–5.1)
PROMYELOCYTES NFR BLD MANUAL: 3 %
PROT SERPL-MCNC: 6.2 G/DL (ref 6–8.4)
RBC # BLD AUTO: 3.11 M/UL (ref 4.6–6.2)
SODIUM SERPL-SCNC: 132 MMOL/L (ref 136–145)
WBC # BLD AUTO: 5.83 K/UL (ref 3.9–12.7)

## 2022-09-09 PROCEDURE — 25000003 PHARM REV CODE 250: Performed by: STUDENT IN AN ORGANIZED HEALTH CARE EDUCATION/TRAINING PROGRAM

## 2022-09-09 PROCEDURE — 85027 COMPLETE CBC AUTOMATED: CPT | Performed by: INTERNAL MEDICINE

## 2022-09-09 PROCEDURE — 63600175 PHARM REV CODE 636 W HCPCS: Performed by: STUDENT IN AN ORGANIZED HEALTH CARE EDUCATION/TRAINING PROGRAM

## 2022-09-09 PROCEDURE — 85007 BL SMEAR W/DIFF WBC COUNT: CPT | Performed by: INTERNAL MEDICINE

## 2022-09-09 PROCEDURE — 80053 COMPREHEN METABOLIC PANEL: CPT | Performed by: INTERNAL MEDICINE

## 2022-09-09 PROCEDURE — A4216 STERILE WATER/SALINE, 10 ML: HCPCS | Performed by: STUDENT IN AN ORGANIZED HEALTH CARE EDUCATION/TRAINING PROGRAM

## 2022-09-09 PROCEDURE — 96365 THER/PROPH/DIAG IV INF INIT: CPT

## 2022-09-09 RX ORDER — SODIUM CHLORIDE 0.9 % (FLUSH) 0.9 %
10 SYRINGE (ML) INJECTION
Status: DISCONTINUED | OUTPATIENT
Start: 2022-09-09 | End: 2022-09-09 | Stop reason: HOSPADM

## 2022-09-09 RX ORDER — HEPARIN 100 UNIT/ML
500 SYRINGE INTRAVENOUS
Status: DISCONTINUED | OUTPATIENT
Start: 2022-09-09 | End: 2022-09-09 | Stop reason: HOSPADM

## 2022-09-09 RX ADMIN — IRON SUCROSE: 20 INJECTION, SOLUTION INTRAVENOUS at 02:09

## 2022-09-09 RX ADMIN — HEPARIN 500 UNITS: 100 SYRINGE at 03:09

## 2022-09-09 RX ADMIN — SODIUM CHLORIDE, PRESERVATIVE FREE 10 ML: 5 INJECTION INTRAVENOUS at 03:09

## 2022-09-09 NOTE — PROGRESS NOTES
Outpatient Care Management  Initial Patient Assessment    Patient: Cyrus Batres Jr.  MRN: [id number]  Date of Service: 09/07/2022  Completed by: Eugenia Agudelo RN  Referral Date: 09/02/2022  Program: High Risk  Status: Ongoing  Effective Dates: 9/16/2022 - present  Responsible Staff: Eugenia Agudelo RN        Reason for Visit   Patient presents with    OPCM Chart Review    OPCM Enrollment Call    OPCM RN First Assessment Attempt     09/09/22-letter thru my chart     OPCM RN Second Assessment Attempt     09/15/22    Initial Assessment     09/15/22    Nursing Assessment     19/15/22    Plan Of Care     09/15/22       Brief Summary:  Cyrus Batres Jr. was referred by Dr. Mar at [facility]  for  Pneumatosis intestinalis. Patient qualifies for program based on high risk score 85.1%.   Active problem list, medical, surgical and social history reviewed. Active comorbidities include  Afib, anemia, cancer of base of tongue and diarrhea. Areas of need identified by patient include bed sore, diarrhea and weight loss.  Complex care plan created with patient/caregiver input. By next encounter, patient agrees to go to scheduled appointments.  [facility]. Wife will speak to patient about OPCM. Will send RN contact info thru my chart. RN OPCM 1'st assessment attempt.   09/15/22-Mr. Batres was admitted to the hospital on 09/01/22 with complaints nausea and vomiting unable to take his Glucerna or medications thru peg tube with diarrhea and abdominal pain.Laryngeal stoma and peg tube in place .GI consult in hospital-Pneumatosis intestinalis likely related to his chemotherapy.Dietician changed him to Jevity per peg tube, 6 cartons a day. Discharged home on 09/06/22. Declines home health. Conference call with Mr and Mrs Batres. Mr. Batres is non vocal. He is independent with his ADL's. He takes his blood pressure every morning. Did not have opium tincture filled at this  time for diarrhea. Managing diarrhea with lomotil.   Appts-   09/16/22-labs CBC and CMP  09/19/22-wound care for bedsore approximately 1 1/2 inches long an 1/2 inch wide  09/19/22-Dr. Khoobehi and possible chemo depending on labs and diarrhea (4'th round of chemo)  09/23/22-Dr. Tamez and possible chemo   Called [facility] and left message for OBBO Barros at [phone] for dietician to call RN about supplements to assist with weight gain.   09/16/22-BOBO Cortez called back and Elyssa is the dietician, phone number is [phone].Called JAMIR Bergeron and she will call wife about supplements to increase his weight.     CM ACTION PLAN:  Follow up in two weeks- call around 4- wife works and get off work around 3:30  Do they need OPCM SW for community resources      Assessment Documentation     OPCM Initial Assessment    Involvement of Care  Do I have permission to speak with other family members about your care?: Yes (Comment: wife Janel)  Assessment completed by: Patient  Identified Areas of Need  Advanced Care Planning: No  Housing: no  Medication Adherence: No  *Active medication list was reviewed and reconciled with patient and/or caregiver:   Nutrition: yes  Lab Adherence: no  Depression: No  Cognitive/Behavioral Health: no  Communication: no  Health Literacy: no  Fall risk?: No          Problem List and History     Problems Addressed This Visit    Atrial Fibrillation: Not identified by patient as current problem  Hypertension: Not identified by patient as current problem  Diabetes: Not identified by patient as current problem  Anemia: Not identified by patient as current problem  Chronic Kidney Disease: Not identified by patient as current problem  Hyperlipidemia: Not identified by patient as current problem  Heart Disease: Not identified by patient as current problem         Reviewed medical and social history with patient and/or caregiver. A complex care plan was discussed and completed today,  with input from patient and/or caregiver.    Patient Instructions     Instructions were provided via the [employer] patient resources and are available for the patient to view on the patient portal, if active.      Follow up in about 2 weeks (around 9/28/2022) for RN Follow up call.    Todays OPCM Self-Management Care Plan was developed with the patients/caregivers input and was based on identified barriers from todays assessment.  Goals were written today with the patient/caregiver and the patient has agreed to work towards these goals to improve his/her overall well-being. Patient verbalized understanding of the care plan, goals, and all of today's instructions. Encouraged patient/caregiver to communicate with his/her physician and health care team about health conditions and the treatment plan.  Provided my contact information today and encouraged patient/caregiver to call me with any questions as needed.

## 2022-09-09 NOTE — PLAN OF CARE
Problem: Fatigue  Goal: Improved Activity Tolerance  Outcome: Ongoing, Progressing  Intervention: Promote Improved Energy  Flowsheets (Taken 9/9/2022 9187)  Fatigue Management:   fatigue-related activity identified   frequent rest breaks encouraged   paced activity encouraged  Sleep/Rest Enhancement:   regular sleep/rest pattern promoted   relaxation techniques promoted

## 2022-09-12 ENCOUNTER — OFFICE VISIT (OUTPATIENT)
Dept: HEMATOLOGY/ONCOLOGY | Facility: CLINIC | Age: 72
End: 2022-09-12
Payer: MEDICARE

## 2022-09-12 VITALS
DIASTOLIC BLOOD PRESSURE: 78 MMHG | HEART RATE: 107 BPM | WEIGHT: 132.25 LBS | TEMPERATURE: 98 F | OXYGEN SATURATION: 98 % | RESPIRATION RATE: 12 BRPM | BODY MASS INDEX: 21.25 KG/M2 | HEIGHT: 66 IN | SYSTOLIC BLOOD PRESSURE: 127 MMHG

## 2022-09-12 DIAGNOSIS — R11.2 CHEMOTHERAPY-INDUCED NAUSEA AND VOMITING: ICD-10-CM

## 2022-09-12 DIAGNOSIS — T45.1X5A CHEMOTHERAPY INDUCED NEUTROPENIA: ICD-10-CM

## 2022-09-12 DIAGNOSIS — T45.1X5A CHEMOTHERAPY INDUCED DIARRHEA: ICD-10-CM

## 2022-09-12 DIAGNOSIS — T45.1X5A ANTINEOPLASTIC CHEMOTHERAPY INDUCED ANEMIA: ICD-10-CM

## 2022-09-12 DIAGNOSIS — D64.81 ANTINEOPLASTIC CHEMOTHERAPY INDUCED ANEMIA: ICD-10-CM

## 2022-09-12 DIAGNOSIS — D70.1 CHEMOTHERAPY INDUCED NEUTROPENIA: ICD-10-CM

## 2022-09-12 DIAGNOSIS — T45.1X5A CHEMOTHERAPY-INDUCED NAUSEA AND VOMITING: ICD-10-CM

## 2022-09-12 DIAGNOSIS — K52.1 CHEMOTHERAPY INDUCED DIARRHEA: ICD-10-CM

## 2022-09-12 DIAGNOSIS — C01 PRIMARY CANCER OF BASE OF TONGUE: Primary | ICD-10-CM

## 2022-09-12 PROCEDURE — 3008F BODY MASS INDEX DOCD: CPT | Mod: CPTII,S$GLB,, | Performed by: INTERNAL MEDICINE

## 2022-09-12 PROCEDURE — 99214 OFFICE O/P EST MOD 30 MIN: CPT | Mod: S$GLB,,, | Performed by: INTERNAL MEDICINE

## 2022-09-12 PROCEDURE — 99999 PR PBB SHADOW E&M-EST. PATIENT-LVL V: ICD-10-PCS | Mod: PBBFAC,,, | Performed by: INTERNAL MEDICINE

## 2022-09-12 PROCEDURE — 3060F POS MICROALBUMINURIA REV: CPT | Mod: CPTII,S$GLB,, | Performed by: INTERNAL MEDICINE

## 2022-09-12 PROCEDURE — 3288F FALL RISK ASSESSMENT DOCD: CPT | Mod: CPTII,S$GLB,, | Performed by: INTERNAL MEDICINE

## 2022-09-12 PROCEDURE — 3074F SYST BP LT 130 MM HG: CPT | Mod: CPTII,S$GLB,, | Performed by: INTERNAL MEDICINE

## 2022-09-12 PROCEDURE — 1160F RVW MEDS BY RX/DR IN RCRD: CPT | Mod: CPTII,S$GLB,, | Performed by: INTERNAL MEDICINE

## 2022-09-12 PROCEDURE — 3008F PR BODY MASS INDEX (BMI) DOCUMENTED: ICD-10-PCS | Mod: CPTII,S$GLB,, | Performed by: INTERNAL MEDICINE

## 2022-09-12 PROCEDURE — 3066F NEPHROPATHY DOC TX: CPT | Mod: CPTII,S$GLB,, | Performed by: INTERNAL MEDICINE

## 2022-09-12 PROCEDURE — 99999 PR PBB SHADOW E&M-EST. PATIENT-LVL V: CPT | Mod: PBBFAC,,, | Performed by: INTERNAL MEDICINE

## 2022-09-12 PROCEDURE — 1126F AMNT PAIN NOTED NONE PRSNT: CPT | Mod: CPTII,S$GLB,, | Performed by: INTERNAL MEDICINE

## 2022-09-12 PROCEDURE — 1111F PR DISCHARGE MEDS RECONCILED W/ CURRENT OUTPATIENT MED LIST: ICD-10-PCS | Mod: CPTII,S$GLB,, | Performed by: INTERNAL MEDICINE

## 2022-09-12 PROCEDURE — 3044F PR MOST RECENT HEMOGLOBIN A1C LEVEL <7.0%: ICD-10-PCS | Mod: CPTII,S$GLB,, | Performed by: INTERNAL MEDICINE

## 2022-09-12 PROCEDURE — 4010F PR ACE/ARB THEARPY RXD/TAKEN: ICD-10-PCS | Mod: CPTII,S$GLB,, | Performed by: INTERNAL MEDICINE

## 2022-09-12 PROCEDURE — 4010F ACE/ARB THERAPY RXD/TAKEN: CPT | Mod: CPTII,S$GLB,, | Performed by: INTERNAL MEDICINE

## 2022-09-12 PROCEDURE — 3288F PR FALLS RISK ASSESSMENT DOCUMENTED: ICD-10-PCS | Mod: CPTII,S$GLB,, | Performed by: INTERNAL MEDICINE

## 2022-09-12 PROCEDURE — 3078F DIAST BP <80 MM HG: CPT | Mod: CPTII,S$GLB,, | Performed by: INTERNAL MEDICINE

## 2022-09-12 PROCEDURE — 3074F PR MOST RECENT SYSTOLIC BLOOD PRESSURE < 130 MM HG: ICD-10-PCS | Mod: CPTII,S$GLB,, | Performed by: INTERNAL MEDICINE

## 2022-09-12 PROCEDURE — 1159F MED LIST DOCD IN RCRD: CPT | Mod: CPTII,S$GLB,, | Performed by: INTERNAL MEDICINE

## 2022-09-12 PROCEDURE — 99214 PR OFFICE/OUTPT VISIT, EST, LEVL IV, 30-39 MIN: ICD-10-PCS | Mod: S$GLB,,, | Performed by: INTERNAL MEDICINE

## 2022-09-12 PROCEDURE — 3066F PR DOCUMENTATION OF TREATMENT FOR NEPHROPATHY: ICD-10-PCS | Mod: CPTII,S$GLB,, | Performed by: INTERNAL MEDICINE

## 2022-09-12 PROCEDURE — 3078F PR MOST RECENT DIASTOLIC BLOOD PRESSURE < 80 MM HG: ICD-10-PCS | Mod: CPTII,S$GLB,, | Performed by: INTERNAL MEDICINE

## 2022-09-12 PROCEDURE — 1160F PR REVIEW ALL MEDS BY PRESCRIBER/CLIN PHARMACIST DOCUMENTED: ICD-10-PCS | Mod: CPTII,S$GLB,, | Performed by: INTERNAL MEDICINE

## 2022-09-12 PROCEDURE — 1159F PR MEDICATION LIST DOCUMENTED IN MEDICAL RECORD: ICD-10-PCS | Mod: CPTII,S$GLB,, | Performed by: INTERNAL MEDICINE

## 2022-09-12 PROCEDURE — 1126F PR PAIN SEVERITY QUANTIFIED, NO PAIN PRESENT: ICD-10-PCS | Mod: CPTII,S$GLB,, | Performed by: INTERNAL MEDICINE

## 2022-09-12 PROCEDURE — 1101F PT FALLS ASSESS-DOCD LE1/YR: CPT | Mod: CPTII,S$GLB,, | Performed by: INTERNAL MEDICINE

## 2022-09-12 PROCEDURE — 1111F DSCHRG MED/CURRENT MED MERGE: CPT | Mod: CPTII,S$GLB,, | Performed by: INTERNAL MEDICINE

## 2022-09-12 PROCEDURE — 3044F HG A1C LEVEL LT 7.0%: CPT | Mod: CPTII,S$GLB,, | Performed by: INTERNAL MEDICINE

## 2022-09-12 PROCEDURE — 1101F PR PT FALLS ASSESS DOC 0-1 FALLS W/OUT INJ PAST YR: ICD-10-PCS | Mod: CPTII,S$GLB,, | Performed by: INTERNAL MEDICINE

## 2022-09-12 PROCEDURE — 3060F PR POS MICROALBUMINURIA RESULT DOCUMENTED/REVIEW: ICD-10-PCS | Mod: CPTII,S$GLB,, | Performed by: INTERNAL MEDICINE

## 2022-09-12 RX ORDER — MORPHINE 10 MG/ML
10 TINCTURE ORAL 4 TIMES DAILY PRN
Qty: 118 ML | Refills: 0 | Status: SHIPPED | OUTPATIENT
Start: 2022-09-12 | End: 2022-09-12

## 2022-09-12 RX ORDER — MORPHINE 10 MG/ML
10 TINCTURE ORAL 4 TIMES DAILY PRN
Qty: 118 ML | Refills: 0 | Status: SHIPPED | OUTPATIENT
Start: 2022-09-12 | End: 2022-12-16 | Stop reason: ALTCHOICE

## 2022-09-12 NOTE — PROGRESS NOTES
Service Date:  9/12/22    Chief Complaint: tongue cancer    Cyrus Batres Jr. is a 72 y.o. male referred here by Dr. Noel for laryngeal cancer.      Oncological history is as followed:  10/30/2020: completion of IMRT to larynx and bilateral necks via SiB totaling 6996 cGy.  Diagnosed as a T2 N0 M0 lesion at that time, stage II.   3/16/2021: SML left VF resection encompassing AC; margins negative on frozen, some margins positive on permanent  4/1/2021: SML KTP left transmuscular cordectomy, anterior right sublig resection encompassing AC; left TVF clear with negative margin;  right TVF frozen margins negative but positive on permanent  5/6/2021: SML KTP transmusc resection anterior right TVF; negative for carcinoma  1/6/2022: s/p Total laryngectomy; T4aN0 +PNI; no adjuvant radiation therapy or systemic therapy given    Now with malignancy at the base of the tongue [id number]. Negative mets on PET scan. Patient is not a candidate for further radiation therapy and does not want surgery as he would need a glossectomy.    Here for 4th cycle of chemotherapy.  He has been having diarrhea from the start of chemo.  He is taking Imodium every 4 hours.  He also took his Lomotil.  He is getting no relief.  He is frustrated with his diarrhea.  He was recently admitted for pneumatosis.  He was treated conservatively and this issue resolved.    Review of Systems   Constitutional: Negative.    HENT: Negative.     Eyes: Negative.    Respiratory: Negative.     Cardiovascular: Negative.    Gastrointestinal:  Positive for diarrhea, nausea and vomiting.   Endocrine: Negative.    Genitourinary: Negative.    Musculoskeletal: Negative.    Integumentary:  Negative.   Neurological: Negative.    Hematological: Negative.    Psychiatric/Behavioral: Negative.        Current Outpatient Medications   Medication Instructions    acetaminophen (TYLENOL) 325 mg, Oral, Every 6 hours PRN    dexAMETHasone (DECADRON) 8 mg, Oral, Daily, Take as directed on  days 2, 3, and 4 of your chemotherapy cycle.    diphenhydrAMINE (BENADRYL) 25 mg, Oral, Every 6 hours PRN    diphenoxylate-atropine 2.5-0.025 mg (LOMOTIL) 2.5-0.025 mg per tablet 1 tablet, Oral, 4 times daily PRN    esomeprazole (NEXIUM) 40 mg, Before breakfast    guaiFENesin-codeine 100-10 mg/5 ml (TUSSI-ORGANIDIN NR)  mg/5 mL syrup 5 mLs, Per G Tube, 3 times daily PRN    levoFLOXacin (LEVAQUIN) 750 mg, Per G Tube, Daily    levothyroxine (SYNTHROID) 100 mcg, Oral, Before breakfast    lipase-protease-amylase 24,000-76,000-120,000 units (CREON) 24,000-76,000 -120,000 unit capsule 1 capsule, Oral, 3 times daily with meals    losartan (COZAAR) 100 MG tablet TAKE 1 TABLET BY MOUTH EVERY DAY    metFORMIN (GLUCOPHAGE) 500 mg, Oral, 2 times daily with meals    metroNIDAZOLE (FLAGYL) 500 mg, Per G Tube, 3 times daily    nut.tx.gluc intol,lf,soy-fiber (GLUCERNA 1.5 TRISHA) 0.08-1.5 gram-kcal/mL Liqd 5 Cans, PEG Tube, 5 times daily    OLANZapine (ZYPREXA) 5 mg, Oral, Nightly, Take as directed on days 1-4 of your chemotherapy cycle.    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    Postoperative hypothyroidism 7/7/2022        Past Surgical History:   Procedure Laterality Date    DIRECT LARYNGOBRONCHOSCOPY N/A 12/27/2021    Procedure: LARYNGOSCOPY, DIRECT, WITH BRONCHOSCOPY;  Surgeon: Flex Espinosa MD;  Location: [facility] OR [address] [place] [address];  Service: ENT;  Laterality: N/A;    DISSECTION OF NECK Bilateral 1/6/2022    Procedure: DISSECTION, NECK;  Surgeon: Jesse James MD;  Location: [facility] OR [address] [place] [address];  Service: ENT;   Laterality: Bilateral;    FLAP PROCEDURE Right 1/6/2022    Procedure: CREATION, FREE FLAP;  Surgeon: Alise Hart MD;  [facility];  Service: ENT;  Laterality: Right;  Ischemic start 1351  Ischemic stop 1502    INSERTION OF TUNNELED CENTRAL VENOUS CATHETER (CVC) WITH SUBCUTANEOUS PORT N/A 6/9/2022    Procedure: [id number]-ESVX-F-CATH;  Surgeon: Jesus Viera MD;  Location: [facility] OR;  Service: General;  Laterality: N/A;    LARYNGECTOMY N/A 1/6/2022    Procedure: LARYNGECTOMY;  Surgeon: Jesse James MD;  [facility];  Service: ENT;  Laterality: N/A;    LARYNGOSCOPY N/A 8/4/2020    Procedure: Suspension microlaryngoscopy with biopsy, possible KTP laser treatment/excision;  Surgeon: Stew Noel MD;  Location: [facility] OR [address] [place] [address];  Service: ENT;  Laterality: N/A;  Microscope, telescopes, tower, microinstruments, KTP laser, rep conf# [id number] IC 7/28.    LARYNGOSCOPY N/A 3/16/2021    Procedure: Suspension microlaryngoscopy with excision of lesion, possible CO2 laser;  Surgeon: Stew Noel MD;  Location: [facility] OR [address] [place] [address];  Service: ENT;  Laterality: N/A;  Microscope, telescopes, tower, microinstruments, CO2 laser, rep conf# [id number] IC 3/4.    LARYNGOSCOPY N/A 4/1/2021    Procedure: Suspension microlaryngoscopy with KTP laser excision of lesion;  Surgeon: Stew Noel MD;  [facility];  Service: ENT;  Laterality: N/A;  Microscope, telescopes, tower, microinstruments, 70 degree scope, vocal fold , KTP laser, rep conf# [id number] BC    LARYNGOSCOPY N/A 12/9/2021    Procedure: Suspension microlaryngoscopy with biopsy;  Surgeon: Stew Noel MD;  [facility];  Service: ENT;  Laterality: N/A;  Microscope, telescopes, tower, microinstruments    LARYNGOSCOPY N/A 1/6/2022    Procedure: LARYNGOSCOPY;  Surgeon: Jesse James MD;  Location: [facility] OR [address] [place] [address];  Service: ENT;  Laterality: N/A;    LARYNGOSCOPY N/A 4/27/2022     "Procedure: LARYNGOSCOPY WITH BIOPSY;  Surgeon: Jesse James MD;  Location: [facility] OR [facility];  Service: ENT;  Laterality: N/A;    MICROLARYNGOSCOPY N/A 3/17/2020    Procedure: MICROLARYNGOSCOPY;  Surgeon: Jung Xiao MD;  Location: [facility] OR;  Service: ENT;  Laterality: N/A;  Laser Microlaryngoscopy  [phone]    REIMPLANTATION OF PARATHYROID TISSUE N/A 1/6/2022    Procedure: REIMPLANTATION, PARATHYROID TISSUE;  Surgeon: Jesse James MD;  Location: [facility] OR [facility];  Service: ENT;  Laterality: N/A;    THYROIDECTOMY  1/6/2022    Procedure: THYROIDECTOMY;  Surgeon: Jesse James MD;  [facility];  Service: ENT;;    TRACHEOSTOMY N/A 12/27/2021    Procedure: CREATION, TRACHEOSTOMY;  Surgeon: Flex Espinosa MD;  [facility];  Service: ENT;  Laterality: N/A;        Family History   Problem Relation Age of Onset    Abnormal EKG Mother     Diabetes Father     Heart disease Father     Hypertension Father        Social History     Tobacco Use    Smoking status: Never    Smokeless tobacco: Never   Substance Use Topics    Alcohol use: Not Currently     Comment: occasional    Drug use: No         Vitals:    09/12/22 1043   BP: 127/78   Pulse: 107   Resp: 12   Temp: 97.6 °F (36.4 °C)        Physical Exam:  /78 (BP Location: Right arm, Patient Position: Sitting, BP Method: Medium (Automatic))   Pulse 107   Temp 97.6 °F (36.4 °C) (Temporal)   Resp 12   Ht 5' 6" (1.676 m)   Wt 60 kg (132 lb 4.4 oz)   SpO2 98%   BMI 21.35 kg/m²     Physical Exam  Vitals and nursing note reviewed.   Constitutional:       Appearance: Normal appearance.   HENT:      Head: Normocephalic and atraumatic.      Nose: Nose normal.      Mouth/Throat:      Mouth: Mucous membranes are moist.      Pharynx: Oropharynx is clear.   Eyes:      Extraocular Movements: Extraocular movements intact.      Conjunctiva/sclera: Conjunctivae normal.   Cardiovascular: "      Rate and Rhythm: Normal rate and regular rhythm.      Heart sounds: Normal heart sounds.   Pulmonary:      Effort: Pulmonary effort is normal.      Breath sounds: Normal breath sounds.   Abdominal:      General: Abdomen is flat. Bowel sounds are normal.      Palpations: Abdomen is soft.   Musculoskeletal:         General: Normal range of motion.      Cervical back: Normal range of motion and neck supple.   Skin:     General: Skin is warm and dry.   Neurological:      General: No focal deficit present.      Mental Status: He is alert and oriented to person, place, and time. Mental status is at baseline.   Psychiatric:         Mood and Affect: Mood normal.        Labs:  Lab Results   Component Value Date    WBC 5.83 09/09/2022    RBC 3.11 (L) 09/09/2022    HGB 8.6 (L) 09/09/2022    HCT 26.4 (L) 09/09/2022    MCV 85 09/09/2022    MCH 27.7 09/09/2022    MCHC 32.6 09/09/2022    RDW 27.2 (H) 09/09/2022     (L) 09/09/2022    MPV 9.6 09/09/2022    GRAN 74.0 (H) 09/09/2022    LYMPH 9.0 (L) 09/09/2022    MONO 8.0 09/09/2022    EOS 0.0 09/05/2022    BASO 0.00 09/05/2022    EOSINOPHIL 0.0 09/09/2022    BASOPHIL 0.0 09/09/2022     Sodium   Date Value Ref Range Status   09/09/2022 132 (L) 136 - 145 mmol/L Final     Potassium   Date Value Ref Range Status   09/09/2022 4.1 3.5 - 5.1 mmol/L Final     Chloride   Date Value Ref Range Status   09/09/2022 101 95 - 110 mmol/L Final     CO2   Date Value Ref Range Status   09/09/2022 23 23 - 29 mmol/L Final     Glucose   Date Value Ref Range Status   09/09/2022 191 (H) 70 - 110 mg/dL Final     BUN   Date Value Ref Range Status   09/09/2022 19 8 - 23 mg/dL Final     Creatinine   Date Value Ref Range Status   09/09/2022 0.8 0.5 - 1.4 mg/dL Final     Calcium   Date Value Ref Range Status   09/09/2022 8.5 (L) 8.7 - 10.5 mg/dL Final     Total Protein   Date Value Ref Range Status   09/09/2022 6.2 6.0 - 8.4 g/dL Final     Albumin   Date Value Ref Range Status   09/09/2022 3.3 (L) 3.5  - 5.2 g/dL Final   11/18/2020 3.7 3.6 - 5.1 g/dL Final     Comment:     For additional information, please refer to   http://education.[employer].TV/faq/XJV044 (This link is   being provided for informational/ educational purposes only.)  This test was developed and its analytical performance   characteristics have been determined by [facility]. It has not been cleared or approved by the   [place] Food and Drug Administration. This assay has been validated   pursuant to the CLIA regulations and is used for clinical   purposes.  @ Test Performed By:  [employer] [facility]  Yo Cortes M.D.,   [address] [place] [id number]  CLIA  [id number]       Total Bilirubin   Date Value Ref Range Status   09/09/2022 0.5 0.1 - 1.0 mg/dL Final     Comment:     For infants and newborns, interpretation of results should be based  on gestational age, weight and in agreement with clinical  observations.    Premature Infant recommended reference ranges:  Up to 24 hours.............<8.0 mg/dL  Up to 48 hours............<12.0 mg/dL  3-5 days..................<15.0 mg/dL  6-29 days.................<15.0 mg/dL       Alkaline Phosphatase   Date Value Ref Range Status   09/09/2022 86 55 - 135 U/L Final     AST   Date Value Ref Range Status   09/09/2022 18 10 - 40 U/L Final     ALT   Date Value Ref Range Status   09/09/2022 10 10 - 44 U/L Final     Anion Gap   Date Value Ref Range Status   09/09/2022 8 8 - 16 mmol/L Final     eGFR if    Date Value Ref Range Status   07/29/2022 >60.0 >60 mL/min/1.73 m^2 Final     eGFR if non    Date Value Ref Range Status   07/29/2022 >60.0 >60 mL/min/1.73 m^2 Final     Comment:     Calculation used to obtain the estimated glomerular filtration  rate (eGFR) is the CKD-EPI equation.          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  I will prescribe him a tincture of opium to take every 6 hours as needed.  I emphasized to him not to take this with his Lomotil.    Cough  -patient requesting cough suppressant as it is keeping up at night  -will give prescription for Robitussin with codeine    Aurash Khoobehi, MD  Hematology and Oncology

## 2022-09-13 ENCOUNTER — PATIENT MESSAGE (OUTPATIENT)
Dept: HEMATOLOGY/ONCOLOGY | Facility: CLINIC | Age: 72
End: 2022-09-13
Payer: MEDICARE

## 2022-09-13 DIAGNOSIS — C01 PRIMARY CANCER OF BASE OF TONGUE: Primary | ICD-10-CM

## 2022-09-13 NOTE — PHYSICIAN QUERY
PT Name: Cyrus Batres Jr.  MR #: [id number]    DOCUMENTATION CLARIFICATION     CDS/: Leland Rice               Contact information: [phone]    This form is a permanent document in the medical record.     Query Date: September 13, 2022    By submitting this query, we are merely seeking further clarification of documentation.. Please utilize your independent clinical judgment when addressing the question(s) below.    The medical record contains the following:   Indicators  Supporting Clinical Findings Location in Medical Record   X Energy Intake % Intake of Estimated Energy Needs: 25 - 50 % 9/2 Nutrition note by Magda Akins RD   X Weight Loss Weight loss Yes: 2-13 lbs   eating poorly because of a decreased appetite    Increased bony prominences consistent with weight loss. 9/2 Nutrition note by Magda Akins RD    9/5 Dr. Cardoso    Muscle Loss Thin cachectic male    Bitemporal wasting 9/4 Dr. Crow    9/6 Dr. Cardoso   X Weakness Bilateral arm weakness 9/2 Dr. Le   X Weight, BMI Weight: 134 lb 7.7 oz    BMI 21.7    X Treatment start Clinamix currently until TPN can be initiated    Glucerna 1.5 at 10ml/hr...Start TPN tomorrow if patient is unable to tolerate TF      CM to have MD re-order and to inform home health pharmacy of the order below:   To meet needs: Bolus 1 container Jevity 1.5 6 times / day (2160 kcal, 92 gm protein, 1094 ml free water) 9.1 G&P by Dr. Chappell    9/2 Nutrition note by Magda Akins RD    9/6 Nutrition note by Janel Lemons X Other Impression: 5.  Protein/calorie malnutrition.    previous history of laryngeal cancer status post PEG tube on tube feeding came in with abdominal pain, nausea, vomiting and mild diarrhea.    PRINCIPAL PROBLEM:  Pneumatosis intestinalis    Arranged outpatient tube feeding with case management and follow recommendation of Nutrition 9/5 Dr. Cardoso    9/6 Dr. Mar DC Summary           Dear Dr. Mar, please clarify if you agree  with Dr. Cardoso's diagnosis of malnutrition and if so, clarify severity:    [  ] Mild Malnutrition   [  ] Moderate Malnutrition   [  x] Severe Malnutrition    [  ] Malnutrition, Unable to determine degree   [  ] Disagree w/ malnutrition diagnosis       Please document in your progress notes daily for the duration of treatment until resolved and  include in your discharge summary.

## 2022-09-14 ENCOUNTER — PATIENT MESSAGE (OUTPATIENT)
Dept: WOUND CARE | Facility: HOSPITAL | Age: 72
End: 2022-09-14
Payer: MEDICARE

## 2022-09-16 ENCOUNTER — TELEPHONE (OUTPATIENT)
Dept: HEMATOLOGY/ONCOLOGY | Facility: CLINIC | Age: 72
End: 2022-09-16

## 2022-09-16 ENCOUNTER — LAB VISIT (OUTPATIENT)
Dept: LAB | Facility: HOSPITAL | Age: 72
End: 2022-09-16
Attending: INTERNAL MEDICINE
Payer: MEDICARE

## 2022-09-16 DIAGNOSIS — C32.0 MALIGNANT NEOPLASM OF TRUE VOCAL CORD: ICD-10-CM

## 2022-09-16 LAB
ALBUMIN SERPL BCP-MCNC: 3.9 G/DL (ref 3.5–5.2)
ALP SERPL-CCNC: 72 U/L (ref 55–135)
ALT SERPL W/O P-5'-P-CCNC: 14 U/L (ref 10–44)
ANION GAP SERPL CALC-SCNC: 7 MMOL/L (ref 8–16)
AST SERPL-CCNC: 20 U/L (ref 10–40)
BASOPHILS # BLD AUTO: 0.01 K/UL (ref 0–0.2)
BASOPHILS NFR BLD: 0.2 % (ref 0–1.9)
BILIRUB SERPL-MCNC: 0.8 MG/DL (ref 0.1–1)
BUN SERPL-MCNC: 16 MG/DL (ref 8–23)
CALCIUM SERPL-MCNC: 8.6 MG/DL (ref 8.7–10.5)
CHLORIDE SERPL-SCNC: 101 MMOL/L (ref 95–110)
CO2 SERPL-SCNC: 26 MMOL/L (ref 23–29)
CREAT SERPL-MCNC: 0.8 MG/DL (ref 0.5–1.4)
DIFFERENTIAL METHOD: ABNORMAL
EOSINOPHIL # BLD AUTO: 0 K/UL (ref 0–0.5)
EOSINOPHIL NFR BLD: 0.4 % (ref 0–8)
ERYTHROCYTE [DISTWIDTH] IN BLOOD BY AUTOMATED COUNT: 29.2 % (ref 11.5–14.5)
EST. GFR  (NO RACE VARIABLE): >60 ML/MIN/1.73 M^2
GLUCOSE SERPL-MCNC: 264 MG/DL (ref 70–110)
HCT VFR BLD AUTO: 33.2 % (ref 40–54)
HGB BLD-MCNC: 10.3 G/DL (ref 14–18)
IMM GRANULOCYTES # BLD AUTO: 0.19 K/UL (ref 0–0.04)
IMM GRANULOCYTES NFR BLD AUTO: 3.6 % (ref 0–0.5)
LYMPHOCYTES # BLD AUTO: 0.9 K/UL (ref 1–4.8)
LYMPHOCYTES NFR BLD: 16.1 % (ref 18–48)
MCH RBC QN AUTO: 29 PG (ref 27–31)
MCHC RBC AUTO-ENTMCNC: 31 G/DL (ref 32–36)
MCV RBC AUTO: 94 FL (ref 82–98)
MONOCYTES # BLD AUTO: 0.4 K/UL (ref 0.3–1)
MONOCYTES NFR BLD: 7.2 % (ref 4–15)
NEUTROPHILS # BLD AUTO: 3.8 K/UL (ref 1.8–7.7)
NEUTROPHILS NFR BLD: 72.5 % (ref 38–73)
NRBC BLD-RTO: 0 /100 WBC
PLATELET # BLD AUTO: 151 K/UL (ref 150–450)
PMV BLD AUTO: 10.5 FL (ref 9.2–12.9)
POTASSIUM SERPL-SCNC: 4.1 MMOL/L (ref 3.5–5.1)
PROT SERPL-MCNC: 7 G/DL (ref 6–8.4)
RBC # BLD AUTO: 3.55 M/UL (ref 4.6–6.2)
SODIUM SERPL-SCNC: 134 MMOL/L (ref 136–145)
WBC # BLD AUTO: 5.27 K/UL (ref 3.9–12.7)

## 2022-09-16 PROCEDURE — 80053 COMPREHEN METABOLIC PANEL: CPT | Performed by: INTERNAL MEDICINE

## 2022-09-16 PROCEDURE — 85025 COMPLETE CBC W/AUTO DIFF WBC: CPT | Performed by: INTERNAL MEDICINE

## 2022-09-16 PROCEDURE — 36415 COLL VENOUS BLD VENIPUNCTURE: CPT | Performed by: INTERNAL MEDICINE

## 2022-09-16 NOTE — TELEPHONE ENCOUNTER
NUTRITION NOTE:    RD received call from pt's outpatient  stating pt's wife has questions regarding his tube feeding meeting his needs. RD called pt's wife to ensure pt's current order- Jevity 1.5, 6 cartons per day is meeting all of pt's nutritional needs. She also stated they have been blending spinach and bolusing through peg for extra vitamins and minerals. RD encouraged d/c spinach due to risk of clogging his peg. Recommended liquid multivitamin through peg once daily if desired, though micronutrient needs are being met via EN formula. Pt's wife also mentioned pt has been struggling with chronic diarrhea since starting chemo. Pt to come  Banatrol samples next week while here for infusion to help control instances of diarrhea.     Electronically signed by: Barbara Zayas MBA, JAMIRN, LDN

## 2022-09-19 ENCOUNTER — OFFICE VISIT (OUTPATIENT)
Dept: HEMATOLOGY/ONCOLOGY | Facility: CLINIC | Age: 72
End: 2022-09-19
Payer: MEDICARE

## 2022-09-19 ENCOUNTER — OFFICE VISIT (OUTPATIENT)
Dept: WOUND CARE | Facility: HOSPITAL | Age: 72
End: 2022-09-19
Attending: FAMILY MEDICINE
Payer: MEDICARE

## 2022-09-19 ENCOUNTER — PATIENT MESSAGE (OUTPATIENT)
Dept: HEMATOLOGY/ONCOLOGY | Facility: CLINIC | Age: 72
End: 2022-09-19

## 2022-09-19 VITALS
OXYGEN SATURATION: 99 % | HEIGHT: 66 IN | DIASTOLIC BLOOD PRESSURE: 61 MMHG | HEART RATE: 100 BPM | WEIGHT: 133.63 LBS | SYSTOLIC BLOOD PRESSURE: 96 MMHG | BODY MASS INDEX: 21.47 KG/M2 | TEMPERATURE: 98 F | RESPIRATION RATE: 12 BRPM

## 2022-09-19 VITALS
DIASTOLIC BLOOD PRESSURE: 66 MMHG | SYSTOLIC BLOOD PRESSURE: 96 MMHG | HEART RATE: 89 BPM | TEMPERATURE: 98 F | RESPIRATION RATE: 20 BRPM

## 2022-09-19 DIAGNOSIS — D64.81 ANTINEOPLASTIC CHEMOTHERAPY INDUCED ANEMIA: ICD-10-CM

## 2022-09-19 DIAGNOSIS — L89.151 PRESSURE INJURY OF SACRAL REGION, STAGE 1: Primary | ICD-10-CM

## 2022-09-19 DIAGNOSIS — K52.1 CHEMOTHERAPY INDUCED DIARRHEA: ICD-10-CM

## 2022-09-19 DIAGNOSIS — C32.9 LARYNX CANCER: Primary | ICD-10-CM

## 2022-09-19 DIAGNOSIS — C01 PRIMARY CANCER OF BASE OF TONGUE: Primary | ICD-10-CM

## 2022-09-19 DIAGNOSIS — T45.1X5A CHEMOTHERAPY INDUCED NEUTROPENIA: ICD-10-CM

## 2022-09-19 DIAGNOSIS — T45.1X5A CHEMOTHERAPY INDUCED DIARRHEA: ICD-10-CM

## 2022-09-19 DIAGNOSIS — D70.1 CHEMOTHERAPY INDUCED NEUTROPENIA: ICD-10-CM

## 2022-09-19 DIAGNOSIS — T45.1X5A ANTINEOPLASTIC CHEMOTHERAPY INDUCED ANEMIA: ICD-10-CM

## 2022-09-19 DIAGNOSIS — G47.00 INSOMNIA, UNSPECIFIED TYPE: ICD-10-CM

## 2022-09-19 PROCEDURE — 1101F PR PT FALLS ASSESS DOC 0-1 FALLS W/OUT INJ PAST YR: ICD-10-PCS | Mod: CPTII,S$GLB,, | Performed by: INTERNAL MEDICINE

## 2022-09-19 PROCEDURE — 3044F PR MOST RECENT HEMOGLOBIN A1C LEVEL <7.0%: ICD-10-PCS | Mod: CPTII,,, | Performed by: FAMILY MEDICINE

## 2022-09-19 PROCEDURE — 99215 OFFICE O/P EST HI 40 MIN: CPT | Performed by: FAMILY MEDICINE

## 2022-09-19 PROCEDURE — 1160F RVW MEDS BY RX/DR IN RCRD: CPT | Mod: CPTII,S$GLB,, | Performed by: INTERNAL MEDICINE

## 2022-09-19 PROCEDURE — 1111F DSCHRG MED/CURRENT MED MERGE: CPT | Mod: CPTII,,, | Performed by: FAMILY MEDICINE

## 2022-09-19 PROCEDURE — 4010F PR ACE/ARB THEARPY RXD/TAKEN: ICD-10-PCS | Mod: CPTII,,, | Performed by: FAMILY MEDICINE

## 2022-09-19 PROCEDURE — 3060F PR POS MICROALBUMINURIA RESULT DOCUMENTED/REVIEW: ICD-10-PCS | Mod: CPTII,,, | Performed by: FAMILY MEDICINE

## 2022-09-19 PROCEDURE — 1111F PR DISCHARGE MEDS RECONCILED W/ CURRENT OUTPATIENT MED LIST: ICD-10-PCS | Mod: CPTII,,, | Performed by: FAMILY MEDICINE

## 2022-09-19 PROCEDURE — 3288F PR FALLS RISK ASSESSMENT DOCUMENTED: ICD-10-PCS | Mod: CPTII,S$GLB,, | Performed by: INTERNAL MEDICINE

## 2022-09-19 PROCEDURE — 3060F PR POS MICROALBUMINURIA RESULT DOCUMENTED/REVIEW: ICD-10-PCS | Mod: CPTII,S$GLB,, | Performed by: INTERNAL MEDICINE

## 2022-09-19 PROCEDURE — 3044F HG A1C LEVEL LT 7.0%: CPT | Mod: CPTII,,, | Performed by: FAMILY MEDICINE

## 2022-09-19 PROCEDURE — 4010F ACE/ARB THERAPY RXD/TAKEN: CPT | Mod: CPTII,,, | Performed by: FAMILY MEDICINE

## 2022-09-19 PROCEDURE — 3288F FALL RISK ASSESSMENT DOCD: CPT | Mod: CPTII,S$GLB,, | Performed by: INTERNAL MEDICINE

## 2022-09-19 PROCEDURE — 99999 PR PBB SHADOW E&M-EST. PATIENT-LVL V: ICD-10-PCS | Mod: PBBFAC,,, | Performed by: INTERNAL MEDICINE

## 2022-09-19 PROCEDURE — 1101F PT FALLS ASSESS-DOCD LE1/YR: CPT | Mod: CPTII,S$GLB,, | Performed by: INTERNAL MEDICINE

## 2022-09-19 PROCEDURE — 3008F BODY MASS INDEX DOCD: CPT | Mod: CPTII,S$GLB,, | Performed by: INTERNAL MEDICINE

## 2022-09-19 PROCEDURE — 3074F SYST BP LT 130 MM HG: CPT | Mod: CPTII,S$GLB,, | Performed by: INTERNAL MEDICINE

## 2022-09-19 PROCEDURE — 1159F PR MEDICATION LIST DOCUMENTED IN MEDICAL RECORD: ICD-10-PCS | Mod: CPTII,,, | Performed by: FAMILY MEDICINE

## 2022-09-19 PROCEDURE — 3066F NEPHROPATHY DOC TX: CPT | Mod: CPTII,,, | Performed by: FAMILY MEDICINE

## 2022-09-19 PROCEDURE — 99214 PR OFFICE/OUTPT VISIT, EST, LEVL IV, 30-39 MIN: ICD-10-PCS | Mod: S$GLB,,, | Performed by: INTERNAL MEDICINE

## 2022-09-19 PROCEDURE — 3074F PR MOST RECENT SYSTOLIC BLOOD PRESSURE < 130 MM HG: ICD-10-PCS | Mod: CPTII,S$GLB,, | Performed by: INTERNAL MEDICINE

## 2022-09-19 PROCEDURE — 1160F PR REVIEW ALL MEDS BY PRESCRIBER/CLIN PHARMACIST DOCUMENTED: ICD-10-PCS | Mod: CPTII,,, | Performed by: FAMILY MEDICINE

## 2022-09-19 PROCEDURE — 3078F DIAST BP <80 MM HG: CPT | Mod: CPTII,,, | Performed by: FAMILY MEDICINE

## 2022-09-19 PROCEDURE — 1160F RVW MEDS BY RX/DR IN RCRD: CPT | Mod: CPTII,,, | Performed by: FAMILY MEDICINE

## 2022-09-19 PROCEDURE — 3060F POS MICROALBUMINURIA REV: CPT | Mod: CPTII,,, | Performed by: FAMILY MEDICINE

## 2022-09-19 PROCEDURE — 99999 PR PBB SHADOW E&M-EST. PATIENT-LVL V: CPT | Mod: PBBFAC,,, | Performed by: INTERNAL MEDICINE

## 2022-09-19 PROCEDURE — 1126F AMNT PAIN NOTED NONE PRSNT: CPT | Mod: CPTII,S$GLB,, | Performed by: INTERNAL MEDICINE

## 2022-09-19 PROCEDURE — 1159F MED LIST DOCD IN RCRD: CPT | Mod: CPTII,,, | Performed by: FAMILY MEDICINE

## 2022-09-19 PROCEDURE — 1126F AMNT PAIN NOTED NONE PRSNT: CPT | Mod: CPTII,,, | Performed by: FAMILY MEDICINE

## 2022-09-19 PROCEDURE — 3078F PR MOST RECENT DIASTOLIC BLOOD PRESSURE < 80 MM HG: ICD-10-PCS | Mod: CPTII,S$GLB,, | Performed by: INTERNAL MEDICINE

## 2022-09-19 PROCEDURE — 3044F PR MOST RECENT HEMOGLOBIN A1C LEVEL <7.0%: ICD-10-PCS | Mod: CPTII,S$GLB,, | Performed by: INTERNAL MEDICINE

## 2022-09-19 PROCEDURE — 3060F POS MICROALBUMINURIA REV: CPT | Mod: CPTII,S$GLB,, | Performed by: INTERNAL MEDICINE

## 2022-09-19 PROCEDURE — 3066F PR DOCUMENTATION OF TREATMENT FOR NEPHROPATHY: ICD-10-PCS | Mod: CPTII,,, | Performed by: FAMILY MEDICINE

## 2022-09-19 PROCEDURE — 3066F PR DOCUMENTATION OF TREATMENT FOR NEPHROPATHY: ICD-10-PCS | Mod: CPTII,S$GLB,, | Performed by: INTERNAL MEDICINE

## 2022-09-19 PROCEDURE — 3066F NEPHROPATHY DOC TX: CPT | Mod: CPTII,S$GLB,, | Performed by: INTERNAL MEDICINE

## 2022-09-19 PROCEDURE — 1126F PR PAIN SEVERITY QUANTIFIED, NO PAIN PRESENT: ICD-10-PCS | Mod: CPTII,,, | Performed by: FAMILY MEDICINE

## 2022-09-19 PROCEDURE — 1111F PR DISCHARGE MEDS RECONCILED W/ CURRENT OUTPATIENT MED LIST: ICD-10-PCS | Mod: CPTII,S$GLB,, | Performed by: INTERNAL MEDICINE

## 2022-09-19 PROCEDURE — 1159F MED LIST DOCD IN RCRD: CPT | Mod: CPTII,S$GLB,, | Performed by: INTERNAL MEDICINE

## 2022-09-19 PROCEDURE — 99213 OFFICE O/P EST LOW 20 MIN: CPT | Mod: ,,, | Performed by: FAMILY MEDICINE

## 2022-09-19 PROCEDURE — 3008F PR BODY MASS INDEX (BMI) DOCUMENTED: ICD-10-PCS | Mod: CPTII,S$GLB,, | Performed by: INTERNAL MEDICINE

## 2022-09-19 PROCEDURE — 3078F PR MOST RECENT DIASTOLIC BLOOD PRESSURE < 80 MM HG: ICD-10-PCS | Mod: CPTII,,, | Performed by: FAMILY MEDICINE

## 2022-09-19 PROCEDURE — 3074F SYST BP LT 130 MM HG: CPT | Mod: CPTII,,, | Performed by: FAMILY MEDICINE

## 2022-09-19 PROCEDURE — 1111F DSCHRG MED/CURRENT MED MERGE: CPT | Mod: CPTII,S$GLB,, | Performed by: INTERNAL MEDICINE

## 2022-09-19 PROCEDURE — 1159F PR MEDICATION LIST DOCUMENTED IN MEDICAL RECORD: ICD-10-PCS | Mod: CPTII,S$GLB,, | Performed by: INTERNAL MEDICINE

## 2022-09-19 PROCEDURE — 99213 PR OFFICE/OUTPT VISIT, EST, LEVL III, 20-29 MIN: ICD-10-PCS | Mod: ,,, | Performed by: FAMILY MEDICINE

## 2022-09-19 PROCEDURE — 1160F PR REVIEW ALL MEDS BY PRESCRIBER/CLIN PHARMACIST DOCUMENTED: ICD-10-PCS | Mod: CPTII,S$GLB,, | Performed by: INTERNAL MEDICINE

## 2022-09-19 PROCEDURE — 99214 OFFICE O/P EST MOD 30 MIN: CPT | Mod: S$GLB,,, | Performed by: INTERNAL MEDICINE

## 2022-09-19 PROCEDURE — 1126F PR PAIN SEVERITY QUANTIFIED, NO PAIN PRESENT: ICD-10-PCS | Mod: CPTII,S$GLB,, | Performed by: INTERNAL MEDICINE

## 2022-09-19 PROCEDURE — 4010F ACE/ARB THERAPY RXD/TAKEN: CPT | Mod: CPTII,S$GLB,, | Performed by: INTERNAL MEDICINE

## 2022-09-19 PROCEDURE — 4010F PR ACE/ARB THEARPY RXD/TAKEN: ICD-10-PCS | Mod: CPTII,S$GLB,, | Performed by: INTERNAL MEDICINE

## 2022-09-19 PROCEDURE — 3078F DIAST BP <80 MM HG: CPT | Mod: CPTII,S$GLB,, | Performed by: INTERNAL MEDICINE

## 2022-09-19 PROCEDURE — 3044F HG A1C LEVEL LT 7.0%: CPT | Mod: CPTII,S$GLB,, | Performed by: INTERNAL MEDICINE

## 2022-09-19 PROCEDURE — 3074F PR MOST RECENT SYSTOLIC BLOOD PRESSURE < 130 MM HG: ICD-10-PCS | Mod: CPTII,,, | Performed by: FAMILY MEDICINE

## 2022-09-19 RX ORDER — TRAZODONE HYDROCHLORIDE 50 MG/1
TABLET ORAL
Qty: 90 TABLET | Refills: 1 | Status: SHIPPED | OUTPATIENT
Start: 2022-09-19 | End: 2024-02-05

## 2022-09-19 NOTE — PATIENT INSTRUCTIONS
.Much of the documentation for this visit was completed in the Wound Doc system.  Please see the attached documentation for further details about the patient's care.  Scanned in the media tab.

## 2022-09-19 NOTE — Clinical Note
Ct scan neck. Hold treatment again one week. RTC next week. Please ask jacqueline to call for peer to peer for the neulasta.

## 2022-09-19 NOTE — PROGRESS NOTES
Wound Care and Hyperbaric Medicine                Progress Note    Subjective:       Patient ID: Cyrus Batres Jr. is a 72 y.o. male.    Chief Complaint: Wound Care    HPI  Pt seen in clinic for a FU visit for a stage 1 pressure ulcer of the sacrum. Pt had the wound last month and it disappeared and came back this month. Pt was not using any moisturizer on the wound. Pt has no other complaints at this visit.  Review of Systems      Objective:        Physical Exam    Vitals:    09/19/22 0911   BP: 96/66   Pulse: 89   Resp: 20   Temp: 98.4 °F (36.9 °C)       Assessment:           ICD-10-CM ICD-9-CM   1. Pressure injury of sacral region, stage 1  L89.151 707.03     707.21                Plan:                  Cyrus was seen today for wound care.    Diagnoses and all orders for this visit:    Pressure injury of sacral region, stage 1      Much of the documentation for this visit was completed in the Wound Docs system.  Please see the attached documentation for further details about the patient's care. Scanned under the Media tab.

## 2022-09-19 NOTE — PROGRESS NOTES
Service Date:  9/19/22    Chief Complaint: larynx cancer    Cyrus Batres Jr. is a 72 y.o. male referred here by Dr. Noel for laryngeal cancer.      Oncological history is as followed:  10/30/2020: completion of IMRT to larynx and bilateral necks via SiB totaling 6996 cGy.  Diagnosed as a T2 N0 M0 lesion at that time, stage II.   3/16/2021: SML left VF resection encompassing AC; margins negative on frozen, some margins positive on permanent  4/1/2021: SML KTP left transmuscular cordectomy, anterior right sublig resection encompassing AC; left TVF clear with negative margin;  right TVF frozen margins negative but positive on permanent  5/6/2021: SML KTP transmusc resection anterior right TVF; negative for carcinoma  1/6/2022: s/p Total laryngectomy; T4aN0 +PNI; no adjuvant radiation therapy or systemic therapy given    Now with malignancy at the base of the tongue [id number]. Negative mets on PET scan. Patient is not a candidate for further radiation therapy and does not want surgery as he would need a glossectomy.    Here for 4th cycle of chemotherapy.  He has been having diarrhea from the start of chemo.  He is taking Imodium every 4 hours.  He also took his Lomotil.  He is getting no relief.  He is frustrated with his diarrhea.  He was recently admitted for pneumatosis.  He was treated conservatively and this issue resolved.  I gave him a week off to help with his diarrhea which continues.  Just had a prescription for his tincture of opium filled a 1/2 hour ago.  He is not yet started it.    Review of Systems   Constitutional: Negative.    HENT: Negative.     Eyes: Negative.    Respiratory: Negative.     Cardiovascular: Negative.    Gastrointestinal:  Positive for diarrhea, nausea and vomiting.   Endocrine: Negative.    Genitourinary: Negative.    Musculoskeletal: Negative.    Integumentary:  Negative.   Neurological: Negative.    Hematological: Negative.    Psychiatric/Behavioral: Negative.        Current  full range of motion , no edema Outpatient Medications   Medication Instructions    acetaminophen (TYLENOL) 325 mg, Oral, Every 6 hours PRN    dexAMETHasone (DECADRON) 8 mg, Oral, Daily, Take as directed on days 2, 3, and 4 of your chemotherapy cycle.    diphenhydrAMINE (BENADRYL) 25 mg, Oral, Every 6 hours PRN    esomeprazole (NEXIUM) 40 mg, Before breakfast    levoFLOXacin (LEVAQUIN) 750 mg, Per G Tube, Daily    levothyroxine (SYNTHROID) 100 mcg, Oral, Before breakfast    lipase-protease-amylase 24,000-76,000-120,000 units (CREON) 24,000-76,000 -120,000 unit capsule 1 capsule, Oral, 3 times daily with meals    losartan (COZAAR) 100 MG tablet TAKE 1 TABLET BY MOUTH EVERY DAY    metFORMIN (GLUCOPHAGE) 500 mg, Oral, 2 times daily with meals    metroNIDAZOLE (FLAGYL) 500 mg, Per G Tube, 3 times daily    nut.tx.gluc intol,lf,soy-fiber (GLUCERNA 1.5 TRISHA) 0.08-1.5 gram-kcal/mL Liqd 5 Cans, PEG Tube, 5 times daily    OLANZapine (ZYPREXA) 5 mg, Oral, Nightly, Take as directed on days 1-4 of your chemotherapy cycle.    opium tincture 10 mg, Oral, 4 times daily PRN    scopolamine (TRANSDERM-SCOP) 1.3-1.5 mg (1 mg over 3 days) 1 patch, Transdermal, Every 72 hours    traZODone (DESYREL) 50 MG tablet TAKE 1 TABLET BY MOUTH NIGHTLY AS NEEDED FOR INSOMNIA.    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    Postoperative hypothyroidism 7/7/2022        Past Surgical History:   Procedure Laterality Date    DIRECT LARYNGOBRONCHOSCOPY N/A 12/27/2021    Procedure: LARYNGOSCOPY, DIRECT, WITH BRONCHOSCOPY;  Surgeon: Flex Espinosa MD;  Location: [facility] OR [address] [place] [address];  Service: ENT;  Laterality: N/A;    DISSECTION OF NECK Bilateral 1/6/2022    Procedure: DISSECTION, NECK;  Surgeon: Jesse James MD;  Location: [facility] OR [address] [place] [address];  Service: ENT;  Laterality:  Bilateral;    FLAP PROCEDURE Right 1/6/2022    Procedure: CREATION, FREE FLAP;  Surgeon: Alise Hart MD;  [facility];  Service: ENT;  Laterality: Right;  Ischemic start 1351  Ischemic stop 1502    INSERTION OF TUNNELED CENTRAL VENOUS CATHETER (CVC) WITH SUBCUTANEOUS PORT N/A 6/9/2022    Procedure: [id number];  Surgeon: Jesus Viera MD;  Location: [facility] OR;  Service: General;  Laterality: N/A;    LARYNGECTOMY N/A 1/6/2022    Procedure: LARYNGECTOMY;  Surgeon: Jesse James MD;  [facility];  Service: ENT;  Laterality: N/A;    LARYNGOSCOPY N/A 8/4/2020    Procedure: Suspension microlaryngoscopy with biopsy, possible KTP laser treatment/excision;  Surgeon: Stew Noel MD;  [facility];  Service: ENT;  Laterality: N/A;  Microscope, telescopes, tower, microinstruments, KTP laser, rep conf# [id number] IC 7/28.    LARYNGOSCOPY N/A 3/16/2021    Procedure: Suspension microlaryngoscopy with excision of lesion, possible CO2 laser;  Surgeon: Stew Noel MD;  Location: [facility] OR [address] [place] [address];  Service: ENT;  Laterality: N/A;  Microscope, telescopes, tower, microinstruments, CO2 laser, rep conf# [id number] IC 3/4.    LARYNGOSCOPY N/A 4/1/2021    Procedure: Suspension microlaryngoscopy with KTP laser excision of lesion;  Surgeon: Stew Noel MD;  [facility];  Service: ENT;  Laterality: N/A;  Microscope, telescopes, tower, microinstruments, 70 degree scope, vocal fold , KTP laser, rep conf# [id number] BC    LARYNGOSCOPY N/A 12/9/2021    Procedure: Suspension microlaryngoscopy with biopsy;  Surgeon: Stew Noel MD;  [facility];  Service: ENT;  Laterality: N/A;  Microscope, telescopes, tower, microinstruments    LARYNGOSCOPY N/A 1/6/2022    Procedure: LARYNGOSCOPY;  Surgeon: Jesse James MD;  Location: [facility] OR [address] [place] [address];  Service: ENT;  Laterality: N/A;    LARYNGOSCOPY N/A 4/27/2022    Procedure:  "LARYNGOSCOPY WITH BIOPSY;  Surgeon: eJsse James MD;  [facility];  Service: ENT;  Laterality: N/A;    MICROLARYNGOSCOPY N/A 3/17/2020    Procedure: MICROLARYNGOSCOPY;  Surgeon: Jung Xiao MD;  Location: [facility] OR;  Service: ENT;  Laterality: N/A;  Laser Microlaryngoscopy  NEED TO SCHEDULE LASER from [facility] [phone]    REIMPLANTATION OF PARATHYROID TISSUE N/A 1/6/2022    Procedure: REIMPLANTATION, PARATHYROID TISSUE;  Surgeon: Jesse James MD;  [facility]: [facility] OR [facility];  Service: ENT;  Laterality: N/A;    THYROIDECTOMY  1/6/2022    Procedure: THYROIDECTOMY;  Surgeon: Jesse James MD;  [facility];  Service: ENT;;    TRACHEOSTOMY N/A 12/27/2021    Procedure: CREATION, TRACHEOSTOMY;  Surgeon: Flex Espinosa MD;  [facility];  Service: ENT;  Laterality: N/A;        Family History   Problem Relation Age of Onset    Abnormal EKG Mother     Diabetes Father     Heart disease Father     Hypertension Father        Social History     Tobacco Use    Smoking status: Never    Smokeless tobacco: Never   Substance Use Topics    Alcohol use: Not Currently     Comment: occasional    Drug use: No         Vitals:    09/19/22 1332   BP: 96/61   Pulse: 100   Resp: 12   Temp: 97.5 °F (36.4 °C)        Physical Exam:  BP 96/61 (BP Location: Right arm, Patient Position: Sitting, BP Method: Medium (Automatic))   Pulse 100   Temp 97.5 °F (36.4 °C) (Temporal)   Resp 12   Ht 5' 6" (1.676 m)   Wt 60.6 kg (133 lb 9.6 oz)   SpO2 99%   BMI 21.56 kg/m²     Physical Exam  Vitals and nursing note reviewed.   Constitutional:       Appearance: Normal appearance.   HENT:      Head: Normocephalic and atraumatic.      Nose: Nose normal.      Mouth/Throat:      Mouth: Mucous membranes are moist.      Pharynx: Oropharynx is clear.   Eyes:      Extraocular Movements: Extraocular movements intact.      Conjunctiva/sclera: Conjunctivae normal.   Cardiovascular:      Rate " and Rhythm: Normal rate and regular rhythm.      Heart sounds: Normal heart sounds.   Pulmonary:      Effort: Pulmonary effort is normal.      Breath sounds: Normal breath sounds.   Abdominal:      General: Abdomen is flat. Bowel sounds are normal.      Palpations: Abdomen is soft.   Musculoskeletal:         General: Normal range of motion.      Cervical back: Normal range of motion and neck supple.   Skin:     General: Skin is warm and dry.   Neurological:      General: No focal deficit present.      Mental Status: He is alert and oriented to person, place, and time. Mental status is at baseline.   Psychiatric:         Mood and Affect: Mood normal.        Labs:  Lab Results   Component Value Date    WBC 5.27 09/16/2022    RBC 3.55 (L) 09/16/2022    HGB 10.3 (L) 09/16/2022    HCT 33.2 (L) 09/16/2022    MCV 94 09/16/2022    MCH 29.0 09/16/2022    MCHC 31.0 (L) 09/16/2022    RDW 29.2 (H) 09/16/2022     09/16/2022    MPV 10.5 09/16/2022    GRAN 3.8 09/16/2022    GRAN 72.5 09/16/2022    LYMPH 0.9 (L) 09/16/2022    LYMPH 16.1 (L) 09/16/2022    MONO 0.4 09/16/2022    MONO 7.2 09/16/2022    EOS 0.0 09/16/2022    BASO 0.01 09/16/2022    EOSINOPHIL 0.4 09/16/2022    BASOPHIL 0.2 09/16/2022     Sodium   Date Value Ref Range Status   09/16/2022 134 (L) 136 - 145 mmol/L Final     Potassium   Date Value Ref Range Status   09/16/2022 4.1 3.5 - 5.1 mmol/L Final     Chloride   Date Value Ref Range Status   09/16/2022 101 95 - 110 mmol/L Final     CO2   Date Value Ref Range Status   09/16/2022 26 23 - 29 mmol/L Final     Glucose   Date Value Ref Range Status   09/16/2022 264 (H) 70 - 110 mg/dL Final     BUN   Date Value Ref Range Status   09/16/2022 16 8 - 23 mg/dL Final     Creatinine   Date Value Ref Range Status   09/16/2022 0.8 0.5 - 1.4 mg/dL Final     Calcium   Date Value Ref Range Status   09/16/2022 8.6 (L) 8.7 - 10.5 mg/dL Final     Total Protein   Date Value Ref Range Status   09/16/2022 7.0 6.0 - 8.4 g/dL Final      Albumin   Date Value Ref Range Status   09/16/2022 3.9 3.5 - 5.2 g/dL Final   11/18/2020 3.7 3.6 - 5.1 g/dL Final     Comment:     For additional information, please refer to   http://education.King[employer]/faq/KOC184 (This link is   being provided for informational/ educational purposes only.)  This test was developed and its analytical performance   characteristics have been determined by [facility]. It has not been cleared or approved by the   [place] Food and Drug Administration. This assay has been validated   pursuant to the CLIA regulations and is used for clinical   purposes.  @ Test Performed By:  [facility]  Yo Cortes M.D.,   [address] [place] [id number]  [place]  [id number]       Total Bilirubin   Date Value Ref Range Status   09/16/2022 0.8 0.1 - 1.0 mg/dL Final     Comment:     For infants and newborns, interpretation of results should be based  on gestational age, weight and in agreement with clinical  observations.    Premature Infant recommended reference ranges:  Up to 24 hours.............<8.0 mg/dL  Up to 48 hours............<12.0 mg/dL  3-5 days..................<15.0 mg/dL  6-29 days.................<15.0 mg/dL       Alkaline Phosphatase   Date Value Ref Range Status   09/16/2022 72 55 - 135 U/L Final     AST   Date Value Ref Range Status   09/16/2022 20 10 - 40 U/L Final     ALT   Date Value Ref Range Status   09/16/2022 14 10 - 44 U/L Final     Anion Gap   Date Value Ref Range Status   09/16/2022 7 (L) 8 - 16 mmol/L Final     eGFR if    Date Value Ref Range Status   07/29/2022 >60.0 >60 mL/min/1.73 m^2 Final     eGFR if non    Date Value Ref Range Status   07/29/2022 >60.0 >60 mL/min/1.73 m^2 Final     Comment:     Calculation used to obtain the estimated glomerular filtration  rate (eGFR) is the CKD-EPI equation.          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  -hold cycle 4 due to chemotherapy-induced diarrhea    Neutropenic fever  -developed while on chemotherapy  -will get off for Neulasta    Chemotherapy-induced nausea and vomiting  -on Zofran and dexamethasone     Chemotherapy-induced diarrhea   -I am going to hold his treatment for persistent grade 1 diarrhea.  I will prescribe him a tincture of opium to take every 6 hours as needed.  I emphasized to him not to take this with his Lomotil.    Cough  -patient requesting cough suppressant as it is keeping up at night  -will give prescription for Robitussin with codeine    Aurash Khoobehi, MD  Hematology and Oncology

## 2022-09-20 ENCOUNTER — HOSPITAL ENCOUNTER (OUTPATIENT)
Dept: RADIOLOGY | Facility: HOSPITAL | Age: 72
Discharge: HOME | End: 2022-09-20
Attending: INTERNAL MEDICINE
Payer: MEDICARE

## 2022-09-20 DIAGNOSIS — C01 PRIMARY CANCER OF BASE OF TONGUE: ICD-10-CM

## 2022-09-20 PROCEDURE — 25500020 PHARM REV CODE 255

## 2022-09-20 PROCEDURE — 70498 CT ANGIOGRAPHY NECK: CPT | Mod: TC

## 2022-09-20 PROCEDURE — 70498 CTA NECK: ICD-10-PCS | Mod: 26,,, | Performed by: RADIOLOGY

## 2022-09-20 PROCEDURE — 70498 CT ANGIOGRAPHY NECK: CPT | Mod: 26,,, | Performed by: RADIOLOGY

## 2022-09-20 RX ADMIN — IOHEXOL 75 ML: 350 INJECTION, SOLUTION INTRAVENOUS at 10:09

## 2022-09-21 NOTE — PROGRESS NOTES
[facility] Hematolgy/Oncology  History & Physical    Subjective:      Patient ID:   NAME: Cyrus Batres Jr. : 1950     72 y.o. male    Referring Doc: Khoobehi, Aurash, MD  Other Physicians: Penny/Rey, Erika Crow Pena, Nael Noel, Bandar/Jazmine        Chief Complaint: laryngeal cancer with new tongue cancer      HPI:  72 y.o. 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 He is here with his wife.     Patient was noted to initially have a suspicious lesion on the left true vocal cord by laryngoscopy with Dr Xiao in 2020 with biopsy showing severe dysplastic changes without definitive invasive process. He had a CT scan   which showed a 6mm nodule on the left vocal cord. A repeat Ct scan four months later showed the nodule enlarged to 1.4 x 1.1 cm in size. Patient was then seen by Dr Noel and underwent repeat scope in Aug 2020 who found a bulky deeply invading tumor which was debulked and the pathology coming back moderately differentiated SCCA without lymphovascular or perineural invasion. Hs case was subsequently presented to the tumor board and the consensus was to proceed with radiation. He completed XRT on 10/30/2020 and proceeded with regular laryngoscopy surveillance. He eventually required salvage laryngectomy and neck dissection with flap with Dr James on 2022. 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 Fifty lymph nodes were removed which were all negative. Pathology TNM was pT4a, pN0. Patient did not require any adjuvant therapy afterwards.     Patient has been under the care of Dr Khoobehi with [facility] and Dr Portillo with rad/onc. He was recently found to have a new primary cancer involving the base of his  tongue in April 2022. He was deemed not to be a candidate for any further radiation due to proximity of the carotid and did not want to undergo any further surgery as this would necessitate a glossectomy procedure. He has been on adjuvant chemotherapy with carbo/Pembro and 5FU per direction of Dr Khoobehi and has had 3 cycles. His therapy course has been complicated with persistent diarrhea and N/V.     He has peg tube and was on glucerna and is now on Jevity and doing much better and has gained some weight.     He saw Dr Kessler with ENT-SurgOnc at [facility] in July and started on chemotherapy and immunotherapy with carbo/Pembro and 5FU in July 2022 and has had three cycles so far. He is also on IV iron weekly.     He had CTA neck on 9/20/2022 with the mass enlarging despite the recent regimen with three cycles. Discussed with Dr Lucero yesterday with rad/onc and the are going to see patient again and re-explore XRT options    He is breathing ok. No HA's or CP; no pain at this time      He has portacath on left CW    He retired from the phone company    No tobacco hx and little alcohol in past    He has paternal side history of cancer; his sister had NHL; brother with colon and throat cancer    Discussed covid19 and he has been vaccinated              ROS:   GEN: normal without any fever, night sweats or weight loss; he has gained some weight  HEENT: normal with no HA's, sore throat, stiff neck, changes in vision  CV: normal with no CP, SOB, PND, GRIFFIN or orthopnea  PULM: normal with no SOB, cough, hemoptysis, sputum or pleuritic pain  GI: chronic N/V with the chemotherapy; has peg tube; reflux  : normal with no hematuria, dysuria  BREAST: normal with no mass, discharge, pain  SKIN: normal with no rash, erythema, bruising, or swelling       Past Medical/Surgical History:  Past Medical History:   Diagnosis Date    Abnormal CT scan, neck 9/22/2022    Allergy     pollen extracts    Atrial fibrillation     Chronic  anticoagulation     Diabetes mellitus, type 2     Hypertension     Larynx neoplasm malignant 8/4/2020    Postoperative hypothyroidism 7/7/2022     Past Surgical History:   Procedure Laterality Date    DIRECT LARYNGOBRONCHOSCOPY N/A 12/27/2021    Procedure: LARYNGOSCOPY, DIRECT, WITH BRONCHOSCOPY;  Surgeon: Flex Espinosa MD;  [facility];  Service: ENT;  Laterality: N/A;    DISSECTION OF NECK Bilateral 1/6/2022    Procedure: DISSECTION, NECK;  Surgeon: Jesse James MD;  [facility];  Service: ENT;  Laterality: Bilateral;    FLAP PROCEDURE Right 1/6/2022    Procedure: CREATION, FREE FLAP;  Surgeon: Alise Hart MD;  [facility];  Service: ENT;  Laterality: Right;  Ischemic start 1351  Ischemic stop 1502    INSERTION OF TUNNELED CENTRAL VENOUS CATHETER (CVC) WITH SUBCUTANEOUS PORT N/A 6/9/2022    Procedure: WCNOARNUW-KFHA-R-CATH;  Surgeon: Jesus Viera MD;  Location: [facility];  Service: General;  Laterality: N/A;    LARYNGECTOMY N/A 1/6/2022    Procedure: LARYNGECTOMY;  Surgeon: Jesse James MD;  [facility];  Service: ENT;  Laterality: N/A;    LARYNGOSCOPY N/A 8/4/2020    Procedure: Suspension microlaryngoscopy with biopsy, possible KTP laser treatment/excision;  Surgeon: Stew Noel MD;  [facility];  Service: ENT;  Laterality: N/A;  Microscope, telescopes, tower, microinstruments, KTP laser, rep conf# [id number] IC 7/28.    LARYNGOSCOPY N/A 3/16/2021    Procedure: Suspension microlaryngoscopy with excision of lesion, possible CO2 laser;  Surgeon: Stew Noel MD;  [facility];  Service: ENT;  Laterality: N/A;  Microscope, telescopes, tower, microinstruments, CO2 laser, rep conf# [id number] IC 3/4.    LARYNGOSCOPY N/A 4/1/2021    Procedure: Suspension microlaryngoscopy with KTP laser excision of lesion;  Surgeon: Stew Noel MD;  Location: [facility] OR 2ND FLR;  Service: ENT;  Laterality: N/A;   Microscope, telescopes, tower, microinstruments, 70 degree scope, vocal fold , KTP laser, rep conf# [id number] BC    LARYNGOSCOPY N/A 12/9/2021    Procedure: Suspension microlaryngoscopy with biopsy;  Surgeon: Stew Noel MD;  [facility];  Service: ENT;  Laterality: N/A;  Microscope, telescopes, tower, microinstruments    LARYNGOSCOPY N/A 1/6/2022    Procedure: LARYNGOSCOPY;  Surgeon: Jesse James MD;  [facility];  Service: ENT;  Laterality: N/A;    LARYNGOSCOPY N/A 4/27/2022    Procedure: LARYNGOSCOPY WITH BIOPSY;  Surgeon: Jesse James MD;  [facility];  Service: ENT;  Laterality: N/A;    MICROLARYNGOSCOPY N/A 3/17/2020    Procedure: MICROLARYNGOSCOPY;  Surgeon: Jung Xiao MD;  Location: [facility];  Service: ENT;  Laterality: N/A;  Laser Microlaryngoscopy  NEED TO SCHEDULE LASER from [employer] [id number]    REIMPLANTATION OF PARATHYROID TISSUE N/A 1/6/2022    Procedure: REIMPLANTATION, PARATHYROID TISSUE;  Surgeon: Jesse James MD;  [facility];  Service: ENT;  Laterality: N/A;    THYROIDECTOMY  1/6/2022    Procedure: THYROIDECTOMY;  Surgeon: Jesse James MD;  [facility];  Service: ENT;;    TRACHEOSTOMY N/A 12/27/2021    Procedure: CREATION, TRACHEOSTOMY;  Surgeon: Flex Espinosa MD;  [facility];  Service: ENT;  Laterality: N/A;         Allergies:  Review of patient's allergies indicates:   Allergen Reactions    Pollen extracts     Lovastatin Rash     Not confirmed but pt skeptical       Social/Family History:  Social History     Socioeconomic History    Marital status:      Spouse name: Janel Batres    Number of children: 2   Occupational History    Occupation: [employer]     Employer: [employer]   Tobacco Use    Smoking status: Never    Smokeless tobacco: Never   Substance and Sexual Activity    Alcohol use: Not Currently     Comment: occasional    Drug use: No     Sexual activity: Yes     Partners: Female   Social History Narrative    2 children from his 1st wife     Family History   Problem Relation Age of Onset    Abnormal EKG Mother     Diabetes Father     Heart disease Father     Hypertension Father          Medications:    Current Outpatient Medications:     acetaminophen (TYLENOL) 325 MG tablet, Take 325 mg by mouth every 6 (six) hours as needed for Pain., Disp: , Rfl:     diphenhydrAMINE (BENADRYL) 25 mg capsule, Take 25 mg by mouth every 6 (six) hours as needed for Itching., Disp: , Rfl:     esomeprazole (NEXIUM) 40 MG capsule, 40 mg before breakfast., Disp: , Rfl:     levothyroxine (SYNTHROID) 100 MCG tablet, Take 1 tablet (100 mcg total) by mouth before breakfast., Disp: 30 tablet, Rfl: 11    losartan (COZAAR) 100 MG tablet, TAKE 1 TABLET BY MOUTH EVERY DAY (Patient taking differently: Take 100 mg by mouth every evening.), Disp: 90 tablet, Rfl: 3    metFORMIN (GLUCOPHAGE) 500 MG tablet, Take 1 tablet (500 mg total) by mouth 2 (two) times daily with meals., Disp: 60 tablet, Rfl: 3    OLANZapine (ZYPREXA) 5 MG tablet, Take 1 tablet (5 mg total) by mouth every evening. Take as directed on days 1-4 of your chemotherapy cycle., Disp: 30 tablet, Rfl: 5    opium tincture 10 mg/mL (morphine) Tinc, Take 1 mL (10 mg total) by mouth 4 (four) times daily as needed., Disp: 118 mL, Rfl: 0    scopolamine (TRANSDERM-SCOP) 1.3-1.5 mg (1 mg over 3 days), Place 1 patch onto the skin every 72 hours., Disp: 10 patch, Rfl: 0    traZODone (DESYREL) 50 MG tablet, TAKE 1 TABLET BY MOUTH NIGHTLY AS NEEDED FOR INSOMNIA., Disp: 90 tablet, Rfl: 1    zolpidem (AMBIEN) 5 MG Tab, 1 tablet (5 mg total) by Per G Tube route nightly as needed (difficult with sleep)., Disp: 30 tablet, Rfl: 0    dexAMETHasone (DECADRON) 4 MG Tab, Take 2 tablets (8 mg total) by mouth once daily. Take as directed on days 2, 3, and 4 of your chemotherapy cycle. (Patient not taking: No sig reported), Disp: 24 tablet,  Rfl: 2    levoFLOXacin (LEVAQUIN) 750 MG tablet, 1 tablet (750 mg total) by Per G Tube route once daily. (Patient not taking: No sig reported), Disp: 7 tablet, Rfl: 0    lipase-protease-amylase 24,000-76,000-120,000 units (CREON) 24,000-76,000 -120,000 unit capsule, Take 1 capsule by mouth 3 (three) times daily with meals. for 7 days, Disp: 21 capsule, Rfl: 0    metroNIDAZOLE (FLAGYL) 500 MG tablet, 1 tablet (500 mg total) by Per G Tube route 3 (three) times daily. (Patient not taking: No sig reported), Disp: 21 tablet, Rfl: 0    nut.tx.gluc intol,lf,soy-fiber (GLUCERNA 1.5 TRISHA) 0.08-1.5 gram-kcal/mL Liqd, 5 Cans by PEG Tube route 5 (five) times daily. (Patient not taking: No sig reported), Disp: 150 each, Rfl: 5      Pathology:   Cancer Staging   Larynx cancer  Staging form: Larynx - Glottis, AJCC 8th Edition  - Clinical stage from 8/6/2020: Stage II (cT2, cN0, cM0) - Signed by Fariha Muñoz NP on 8/6/2020  - Pathologic stage from 1/20/2022: Stage LOU (pT4a, pN0, cM0) - Signed by Fariha Muñoz NP on 1/20/2022  Staging form: Pharynx - P16 Negative Oropharynx, AJCC 8th Edition  - Clinical: Stage II (rcT2, cN0, cM0) - Signed by Matheus Portillo Jr., MD on 5/30/2022    Primary cancer of base of tongue  Staging form: Pharynx - P16 Negative Oropharynx, AJCC 8th Edition  - Clinical stage from 5/20/2022: Stage II (cT2, cN0, cM0, p16-) - Signed by Saúl Kessler MD on 7/7/2022    Base of Tongue Biopsy  4/27/2022:  Final Pathologic Diagnosis BIOPSY OF BASE OF THE TONGUE:   THE INFILTRATING POORLY DIFFERENTIATED SQUAMOUS CELL CARCINOMA   THE TUMOR IS P16 NEGATIVE.  THE POSITIVE AND NEGATIVE CONTROLS STAINED   APPROPRIATELY     Larynx resection/thyroidectomy 1/6/2022:    Final Pathologic Diagnosis 1. Lymph nodes, left neck levels 2,  3 and 4, dissection:   - Nineteen lymph nodes, all  negative  for metastatic carcinoma (0/19)   2. Lymph nodes, right neck levels 2,  3 and 4, dissection:   - Twenty-eight lymph nodes, all   negative  for metastatic carcinoma (0/28)   3. Possible parathyroid gland, excision:   - Benign parathyroid gland tissue (0.003 g)   4. 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 Soft tissue, posterior tracheal margin, excision:   - Benign, focally reactive respiratory mucosa with submucosal acute   inflammation,  negative  for dysplasia or malignancy   6. Soft tissue, anterior tracheal margin, excision:   - Benign respiratory mucosa with subepithelial cartilage,  negative  for   dysplasia or malignancy   7. Soft tissue, posterior tracheal margin #2, excision:   - Benign, focally reactive respiratory mucosa with submucosal acute   inflammation,  negative  for dysplasia or malignancy   8. Soft tissue, anterior tracheal margin #2, excision:   - Benign, reactive focally ulcerated respiratory mucosa with submucosal acute   inflammation,  negative  for dysplasia or malignancy          Laryngoscope 8/4/2020: (with Dr Noel):  Final Pathologic Diagnosis 1.  Left true vocal fold, biopsy:       -  Invasive moderately differentiated squamous cell carcinoma,   keratinizing type   2.  Left true vocal fold, biopsy:       -  Invasive moderately differentiated squamous cell carcinoma,   keratinizing type          Laryngoscope 3/17/2020 (with Dr Xiao):  Final Pathologic Diagnosis 1.  BIOPSY OF LEFT TRUE VOCAL CORD:   SEVERELY DYSPLASTIC APPEARING SQUAMOUS MUCOSA   INVASIVE CARCINOMA IS NOT DOCUMENTED   ON THE OTHER HAND, THESE FRAGMENTS ARE NODUL AND WITHOUT  "SUBMUCOSA FOR   EVALUATION; IT IS POSSIBLE THAT THEY REFLECT INVASIVE SQUAMOUS CARCINOMA   2.  BIOPSY OF LEFT ANTERIOR COMMISSURE:   MODERATE DYSPLASIA   NO INVASIVE CARCINOMA IDENTIFIED        Objective:   Vitals:  Blood pressure 92/60, pulse 93, temperature 98.7 °F (37.1 °C), resp. rate 16, height 5' 6" (1.676 m), weight 61.7 kg (136 lb).    Physical Examination:   GEN: no apparent distress, comfortable; AAOx3  HEAD: atraumatic and normocephalic  EYES: no pallor, no icterus, PERRLA  ENT: OMM, no pharyngeal erythema, external ears WNL; no nasal discharge; no thrush; s/p laryngectomy with flap  NECK: no masses, thyroid normal, trachea midline, no LAD/LN's, supple  CV: RRR with no murmur; normal pulse; normal S1 and S2; no pedal edema; portacath left CW  CHEST: Normal respiratory effort; CTAB; normal breath sounds; no wheeze or crackles  ABDOM: nontender and nondistended; soft; normal bowel sounds; no rebound/guarding; peg tube  MUSC/Skeletal: ROM normal; no crepitus; joints normal; no deformities or arthropathy  EXTREM: no clubbing, cyanosis, inflammation or swelling  SKIN: no rashes, lesions, ulcers, petechiae or subcutaneous nodules  : no mahan  NEURO: grossly intact; motor/sensory WNL; AAOx3; no tremors  PSYCH: normal mood, affect and behavior  LYMPH: normal cervical, supraclavicular, axillary and groin LN's      Labs:   Lab Results   Component Value Date    WBC 5.41 09/23/2022    HGB 9.8 (L) 09/23/2022    HCT 31.1 (L) 09/23/2022    MCV 95 09/23/2022     09/23/2022    CMP  Sodium   Date Value Ref Range Status   09/23/2022 135 (L) 136 - 145 mmol/L Final     Potassium   Date Value Ref Range Status   09/23/2022 4.1 3.5 - 5.1 mmol/L Final     Chloride   Date Value Ref Range Status   09/23/2022 100 95 - 110 mmol/L Final     CO2   Date Value Ref Range Status   09/23/2022 28 23 - 29 mmol/L Final     Glucose   Date Value Ref Range Status   09/23/2022 158 (H) 70 - 110 mg/dL Final     BUN   Date Value Ref Range " Status   09/23/2022 15 8 - 23 mg/dL Final     Creatinine   Date Value Ref Range Status   09/23/2022 0.7 0.5 - 1.4 mg/dL Final     Calcium   Date Value Ref Range Status   09/23/2022 8.8 8.7 - 10.5 mg/dL Final     Total Protein   Date Value Ref Range Status   09/23/2022 6.6 6.0 - 8.4 g/dL Final     Albumin   Date Value Ref Range Status   09/23/2022 3.5 3.5 - 5.2 g/dL Final   11/18/2020 3.7 3.6 - 5.1 g/dL Final     Comment:     For additional information, please refer to   http://education.MyCheck/faq/HNY907 (This link is   being provided for informational/ educational purposes only.)  This test was developed and its analytical performance   characteristics have been determined by [facility]. It has not been cleared or approved by the   [place] Food and Drug Administration. This assay has been validated   pursuant to the CLIA regulations and is used for clinical   purposes.  @ Test Performed By:  [facility] [place]  Yo Cortes M.D.,   [address] [place] [id number]  [facility]  [id number]       Total Bilirubin   Date Value Ref Range Status   09/23/2022 0.7 0.1 - 1.0 mg/dL Final     Comment:     For infants and newborns, interpretation of results should be based  on gestational age, weight and in agreement with clinical  observations.    Premature Infant recommended reference ranges:  Up to 24 hours.............<8.0 mg/dL  Up to 48 hours............<12.0 mg/dL  3-5 days..................<15.0 mg/dL  6-29 days.................<15.0 mg/dL       Alkaline Phosphatase   Date Value Ref Range Status   09/23/2022 67 55 - 135 U/L Final     AST   Date Value Ref Range Status   09/23/2022 18 10 - 40 U/L Final     ALT   Date Value Ref Range Status   09/23/2022 14 10 - 44 U/L Final     Anion Gap   Date Value Ref Range Status   09/23/2022 7 (L) 8 - 16 mmol/L Final     eGFR if    Date Value Ref Range Status   07/29/2022 >60.0 >60  mL/min/1.73 m^2 Final     eGFR if non    Date Value Ref Range Status   07/29/2022 >60.0 >60 mL/min/1.73 m^2 Final     Comment:     Calculation used to obtain the estimated glomerular filtration  rate (eGFR) is the CKD-EPI equation.            Radiology/Diagnostic Studies:    CTA neck 9/20/2022:    Impression:     1. Similar appearance of the neck vessels when compared to the prior CTA neck with mild narrowing at the origin of the left subclavian artery.  There is no critical stenosis, occlusion, thrombosis or dissection involving the cervical vertebral or carotid arteries.  2. Larger mass within the hypopharynx and tongue base extending anteriorly to the floor of the mouth on the left to the medial margin of the body of the mandible without obvious bony destruction.  3. There is nonocclusive stenosis of the distal V4 segment of the left vertebral artery without change.  4. There is no large vessel occlusion or critical stenosis of the anterior circulation.  5. There is developmental variation with fetal origin of the left posterior cerebral artery.       CT Abdom/pelvis 9/4/2022:    IMPRESSION:     Pneumatosis and pericolonic gas about the ascending colon has decreased in volume compared to prior. Persistent colonic wall thickening which could indicate colitis.     Coronary and aortic atherosclerosis.     Gastrostomy tube is within the stomach.     Bilateral inguinal hernias.        CT Abdom/pelvis 9/1/2022:  IMPRESSION:     1. Pneumatosis intestinalis affecting the ascending colon with associated mild wall thickening. Etiology of this is undetermined. Potential considerations include intestinal ischemia or pneumatosis related to chemotherapy. Clinical and laboratory correlation is needed to assess significance. No portal venous gas or free intraperitoneal air however.  2. Coronary artery calcifications       CTA neck  5/20/2022:    IMPRESSION:  Persistent mass within the hypopharynx consistent with  malignancy.  By my measurements it is larger than on April 24, 2022 with central necrosis.  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 This is similar to the previous April 2022 study.  No evidence of arterial compromise related to malignancy.     PET 5/13/2022:    IMPRESSION:  1. Postoperative changes of prior laryngectomy and lymph node resection/radiation.  2. Masslike FDG avid lesion to the left of midline at the tongue base concerning for residual/recurrent disease.  3. Adjacent confluent nodular extension along the inferior left side of the mass.  4. No FDG avid lymphadenopathy or convincing additional sites of disease in the chest, abdomen or pelvis.     CT head 5/10/2022:  FINDINGS: Comparison to multiple prior exams. There is no acute intracranial hemorrhage, with no mass effect or abnormal extra-axial fluid. Mild scattered areas of nonspecific hypoattenuation involve the deep periventricular white matter, with gray-white differentiation maintained.     There is mild generalized prominence of the cortical sulci and ventricles. The cerebellum and brainstem are unremarkable. There are carotid siphon vascular calcifications. The visualized paranasal sinuses and mastoid air cells are clear. There is no acute calvarial fracture or scalp hematoma.     IMPRESSION: No acute intracranial hemorrhage or acute calvarial fracture    CT soft neck 4/24/2022;    Oral tongue/tongue base:  At the base of the tongue on the left there are findings of a heterogeneously enhancing mass measuring 2.1 cm highly concerning for a neoplastic process.     True and false cords:  Patient status post laryngectomy which has been performed in the interim. Soft tissue stranding in the lower neck likely related to a previous flat reconstruction. No enhancement or lymphadenopathy within the lower neck.     Lymph node assessment:Negative     Surrounding soft tissues:  Negative      Vasculature:  Negative     Osseous structures:  Negative     Lung apices:  Scarring involving the lung apices bilaterally likely related to previous radiation.     IMPRESSION:  1. Postoperative and radiation changes involving the lower neck.  2. Findings highly concerning for a developing mass at the left base of the tongue enhancing 2.1 cm region. Direct visualization in this region would be of benefit.       CT soft neck  12/24/2021  IMPRESSION:  1.  Laryngeal mass as on prior exam. Laryngeal airway narrowing has not changed.  2.  No evidence for active hemorrhage.  3.  Partially visualized patchy groundglass infiltrates in both upper lobes. Also see CT chest report    CTA Chest 12/24/2021:  IMPRESSION:     1.  No pulmonary embolism.     2.  Soft tissue stranding in the region of the strap musculature with ill-defined hypodensity measuring 2.8 x 1.4 cm in the cervical midline.  Findings concerning for infectious process or abscess. Neoplastic process could have similar imaging characteristics.     3.  Suggested erosion of the proximal right parasymphyseal aspect of the thyroid shield.  Correlated with CT soft tissue neck findings. Findings could represent osteomyelitis. Neoplastic process cannot be excluded.     4.  Hepatic steatosis.  5.  Thickening of the gastric wall. Prominence of perigastric vasculature. Small hiatal hernia.     6.  Moderate stool burden.  Correlate for constipation.      PET 12/15/2021:  IMPRESSION:     Substantial qualitative and quantitative increase of hypermetabolic FDG activity associated with the larynx in this patient with known supraglottic neoplasm.     No evidence of FDG avid metastatic disease involving neck, chest, abdomen or pelvis.     PET 8/21/2020:    Impression:     1. Intensely hypermetabolic plaque-like mass along the left true vocal cord, compatible with known laryngeal carcinoma.  2. No findings of regional metastatic disease in the neck, or distant metastatic  disease.       CT Chest 8/10/2020:    IMPRESSION:     No metastatic disease within the chest.  Three-vessel coronary artery calcification. Nondilated cardiac  chambers    CT Soft neck 7/22/2020:  IMPRESSION:  1. Slight interval increase in size of the previously described  enhancing nodule along the anterior left vocal cord.  2. No pathologic lymphadenopathy.  3. Additional and incidental findings as noted above.    CT Soft neck  3/16/2020:    Impression:     6 mm enhancing focus involving the superior aspect of the left focal cord near the anterior commisure.  Recommend direct visualization for further evaluation of laryngeal polyp     Question of 2 mm submucosal lipoma involving the midportion of the superior aspect of the left vocal cord.     Mild arteriosclerosis involving the brachiocephalic arteries and carotid arteries     Mild degenerative change of the cervical spine          All lab results and imaging results have been reviewed and discussed with the patient    Assessment:   (1) 72 y.o. 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    - Patient has been under the care of Dr Khoobehi with [facility] and Dr Portillo with rad/onc.   - He was recently found to have a new primary cancer involving the base of his tongue in April 2022. He was deemed not to be a candidate for any further radiation and did not want to undergo any further surgery as this would necessitate a glossectomy procedure.   - He has been on adjuvant chemotherapy per direction of Dr Khoobehi and has had 3 cycles. His therapy course has been complicated with persistent diarrhea and N/V.     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  - cT2cN0  - he has been on carbo/pembro and 5FU and has had three cycles since July 2022  - recent CTA of  neck with enlargement of the mass  - will set up PET and ask rad/onc to re-evaluate for XRT options  - NCCN guidelines reviewed and on chart (version 2.2022)    (2) Hx/of Laryngeal cancer in Aug 2020 stage II (cT2 N0)  - s/p radiation followed by bilateral neck dissection and total thyroidectomy    Brief Oncology Summary for his laryngeal CA hx:    Patient was noted to initially have a suspicious lesion on the left true vocal cord by laryngoscopy with Dr Xiao in March 2020 with biopsy showing severe dysplastic changes without definitive invasive process. He had a CT scan on 3/16/2020 which showed a 6mm nodule on the left vocal cord. A repeat Ct scan four months later on 7/22/2020 showed the nodule enlarged to 1.4 x 1.1 cm in size. Patient was then seen by Dr Noel and underwent repeat scope in Aug 2020 who found a bulky deeply invading tumor which was debulked and the pathology coming back moderately differentiated SCCA without lymphovascular or perineural invasion. Hs case was subsequently presented to the tumor board and the consensus was to proceed with radiation. He completed XRT on 10/30/2020 and proceeded with regular laryngoscopy surveillance. He eventually required salvage laryngectomy and neck dissection with flap with Dr James on Jan 6th 2022. 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 Fifty lymph nodes were removed which were all negative. Pathology TNM was pT4a, pN0. Patient did not require any adjuvant therapy afterwards.     (2) HTN and hypercholesterolemia    (3) Paroxysmal atrial fibrillation, V tach    (4) DM II    (5) B12 deficiency     (6) Fatty liver disease, GERD    VISIT DIAGNOSES:              Primary cancer of base of tongue  -     Ambulatory referral/consult to Hematology / Oncology    Larynx cancer    Vitamin B12 deficiency    S/P laryngectomy    Abnormal CT  scan, neck          Plan:     PLAN:  Set up PET  Ask rad/onc to see patient again for re-evaluation for any XRT options  If no XRT on the table, will evaluate for new systemic regimen but continue the immunologic arm  Check labs weekly  F/u with PCP, ENT, etc     RTC in  1 weeks   Fax note to Khoobehi, Aurash, MD, Bandar/Dameon Lucero Hasney, Yesenia Gomez    COVID-19 Discussion:    I had long discussion with patient and any applicable family about the COVID-19 coronavirus epidemic and the recommended precautions with regard to cancer and/or hematology patients. I have re-iterated the CDC recommendations for adequate hand washing, use of hand -like products, and coughing into elbow, etc. In addition, especially for our patients who are on chemotherapy and/or our otherwise immunocompromised patients, I have recommended avoidance of crowds, including movie theaters, restaurants, churches, etc. I have recommended avoidance of any sick or symptomatic family members and/or friends. I have also recommended avoidance of any raw and unwashed food products, and general avoidance of food items that have not been prepared by themselves. The patient has been asked to call us immediately with any symptom developments, issues, questions or other general concerns.       Pathology Discussion:    I reviewed and discussed the pathology report(s) and radiograph reports (if available) in as simple to understand and/or laymen's terms to the best of my ability. I had an indepth conversation with the patient and went over the patient's individual diagnosis based on the information that was currently available. I discussed the TNM staging process with regard to the patient's particular cancer type, and the calculated stage based on the currently available TNM data and literature. I discussed the available prognostic data with regard to the current staging information and how it relates to the prognosis of their particular  "neoplastic process.        NCCN Guidelines:    I discussed the available treatment option(s) in accordance with the latest literature from the NCCN Clinical Practice Guidelines for the patient's particular type of cancer disorder. The NCCN Guidelines provide a "document evidence-based (and) consensus-driven management" of the care of oncology patients. The treatment recommendations were made not only in accordance to the NCCN guidelines, but also factored in to account the patient's overall age, condition, performance status and their medical co-morbidities. I went over the risks and benefits of the the treatment options (if any could be made) with regard to their particular cancer type, their cancer stage, their age, and their co-morbidities.       I have explained and the patient understands all of  the current recommendation(s). I have answered all of their questions to the best of my ability and to their complete satisfaction.             Thank you for allowing me to participate in this patient's care. Please call with any questions or concerns.    Electronically signed Venu Tamez MD    Answers submitted by the patient for this visit:  Review of Systems Questionnaire (Submitted on 9/22/2022)  appetite change : No  unexpected weight change: No  mouth sores: No  visual disturbance: Yes  cough: No  shortness of breath: Yes  chest pain: No  abdominal pain: No  diarrhea: Yes  frequency: Yes  back pain: No  rash: No  headaches: No  adenopathy: No  nervous/ anxious: No    "

## 2022-09-22 PROBLEM — R93.89 ABNORMAL CT SCAN, NECK: Status: ACTIVE | Noted: 2022-09-22

## 2022-09-23 ENCOUNTER — LAB VISIT (OUTPATIENT)
Dept: LAB | Facility: HOSPITAL | Age: 72
End: 2022-09-23
Attending: INTERNAL MEDICINE
Payer: MEDICARE

## 2022-09-23 ENCOUNTER — OFFICE VISIT (OUTPATIENT)
Dept: HEMATOLOGY/ONCOLOGY | Facility: CLINIC | Age: 72
End: 2022-09-23
Payer: MEDICARE

## 2022-09-23 VITALS
WEIGHT: 136 LBS | DIASTOLIC BLOOD PRESSURE: 60 MMHG | RESPIRATION RATE: 16 BRPM | HEART RATE: 93 BPM | HEIGHT: 66 IN | BODY MASS INDEX: 21.86 KG/M2 | TEMPERATURE: 99 F | SYSTOLIC BLOOD PRESSURE: 92 MMHG

## 2022-09-23 DIAGNOSIS — C01 PRIMARY CANCER OF BASE OF TONGUE: Primary | ICD-10-CM

## 2022-09-23 DIAGNOSIS — C32.9 LARYNX CANCER: ICD-10-CM

## 2022-09-23 DIAGNOSIS — C32.0 MALIGNANT NEOPLASM OF TRUE VOCAL CORD: ICD-10-CM

## 2022-09-23 DIAGNOSIS — E53.8 VITAMIN B12 DEFICIENCY: ICD-10-CM

## 2022-09-23 DIAGNOSIS — R93.89 ABNORMAL CT SCAN, NECK: ICD-10-CM

## 2022-09-23 DIAGNOSIS — Z90.02 S/P LARYNGECTOMY: ICD-10-CM

## 2022-09-23 LAB
ALBUMIN SERPL BCP-MCNC: 3.5 G/DL (ref 3.5–5.2)
ALP SERPL-CCNC: 67 U/L (ref 55–135)
ALT SERPL W/O P-5'-P-CCNC: 14 U/L (ref 10–44)
ANION GAP SERPL CALC-SCNC: 7 MMOL/L (ref 8–16)
AST SERPL-CCNC: 18 U/L (ref 10–40)
BASOPHILS # BLD AUTO: 0.01 K/UL (ref 0–0.2)
BASOPHILS NFR BLD: 0.2 % (ref 0–1.9)
BILIRUB SERPL-MCNC: 0.7 MG/DL (ref 0.1–1)
BUN SERPL-MCNC: 15 MG/DL (ref 8–23)
CALCIUM SERPL-MCNC: 8.8 MG/DL (ref 8.7–10.5)
CHLORIDE SERPL-SCNC: 100 MMOL/L (ref 95–110)
CO2 SERPL-SCNC: 28 MMOL/L (ref 23–29)
CREAT SERPL-MCNC: 0.7 MG/DL (ref 0.5–1.4)
DIFFERENTIAL METHOD: ABNORMAL
EOSINOPHIL # BLD AUTO: 0 K/UL (ref 0–0.5)
EOSINOPHIL NFR BLD: 0.6 % (ref 0–8)
ERYTHROCYTE [DISTWIDTH] IN BLOOD BY AUTOMATED COUNT: 27.5 % (ref 11.5–14.5)
EST. GFR  (NO RACE VARIABLE): >60 ML/MIN/1.73 M^2
GLUCOSE SERPL-MCNC: 158 MG/DL (ref 70–110)
HCT VFR BLD AUTO: 31.1 % (ref 40–54)
HGB BLD-MCNC: 9.8 G/DL (ref 14–18)
IMM GRANULOCYTES # BLD AUTO: 0.06 K/UL (ref 0–0.04)
IMM GRANULOCYTES NFR BLD AUTO: 1.1 % (ref 0–0.5)
LYMPHOCYTES # BLD AUTO: 0.8 K/UL (ref 1–4.8)
LYMPHOCYTES NFR BLD: 14.6 % (ref 18–48)
MCH RBC QN AUTO: 30.1 PG (ref 27–31)
MCHC RBC AUTO-ENTMCNC: 31.5 G/DL (ref 32–36)
MCV RBC AUTO: 95 FL (ref 82–98)
MONOCYTES # BLD AUTO: 0.5 K/UL (ref 0.3–1)
MONOCYTES NFR BLD: 9.8 % (ref 4–15)
NEUTROPHILS # BLD AUTO: 4 K/UL (ref 1.8–7.7)
NEUTROPHILS NFR BLD: 73.7 % (ref 38–73)
NRBC BLD-RTO: 0 /100 WBC
PLATELET # BLD AUTO: 215 K/UL (ref 150–450)
PLATELET BLD QL SMEAR: ABNORMAL
PMV BLD AUTO: 10.2 FL (ref 9.2–12.9)
POTASSIUM SERPL-SCNC: 4.1 MMOL/L (ref 3.5–5.1)
PROT SERPL-MCNC: 6.6 G/DL (ref 6–8.4)
RBC # BLD AUTO: 3.26 M/UL (ref 4.6–6.2)
SODIUM SERPL-SCNC: 135 MMOL/L (ref 136–145)
WBC # BLD AUTO: 5.41 K/UL (ref 3.9–12.7)

## 2022-09-23 PROCEDURE — 1160F RVW MEDS BY RX/DR IN RCRD: CPT | Mod: CPTII,S$GLB,, | Performed by: INTERNAL MEDICINE

## 2022-09-23 PROCEDURE — 3288F PR FALLS RISK ASSESSMENT DOCUMENTED: ICD-10-PCS | Mod: CPTII,S$GLB,, | Performed by: INTERNAL MEDICINE

## 2022-09-23 PROCEDURE — 3066F PR DOCUMENTATION OF TREATMENT FOR NEPHROPATHY: ICD-10-PCS | Mod: CPTII,S$GLB,, | Performed by: INTERNAL MEDICINE

## 2022-09-23 PROCEDURE — 80053 COMPREHEN METABOLIC PANEL: CPT | Performed by: INTERNAL MEDICINE

## 2022-09-23 PROCEDURE — 3074F PR MOST RECENT SYSTOLIC BLOOD PRESSURE < 130 MM HG: ICD-10-PCS | Mod: CPTII,S$GLB,, | Performed by: INTERNAL MEDICINE

## 2022-09-23 PROCEDURE — 3060F POS MICROALBUMINURIA REV: CPT | Mod: CPTII,S$GLB,, | Performed by: INTERNAL MEDICINE

## 2022-09-23 PROCEDURE — 1126F PR PAIN SEVERITY QUANTIFIED, NO PAIN PRESENT: ICD-10-PCS | Mod: CPTII,S$GLB,, | Performed by: INTERNAL MEDICINE

## 2022-09-23 PROCEDURE — 1159F MED LIST DOCD IN RCRD: CPT | Mod: CPTII,S$GLB,, | Performed by: INTERNAL MEDICINE

## 2022-09-23 PROCEDURE — 3074F SYST BP LT 130 MM HG: CPT | Mod: CPTII,S$GLB,, | Performed by: INTERNAL MEDICINE

## 2022-09-23 PROCEDURE — 3044F HG A1C LEVEL LT 7.0%: CPT | Mod: CPTII,S$GLB,, | Performed by: INTERNAL MEDICINE

## 2022-09-23 PROCEDURE — 4010F PR ACE/ARB THEARPY RXD/TAKEN: ICD-10-PCS | Mod: CPTII,S$GLB,, | Performed by: INTERNAL MEDICINE

## 2022-09-23 PROCEDURE — 1101F PT FALLS ASSESS-DOCD LE1/YR: CPT | Mod: CPTII,S$GLB,, | Performed by: INTERNAL MEDICINE

## 2022-09-23 PROCEDURE — 99204 PR OFFICE/OUTPT VISIT, NEW, LEVL IV, 45-59 MIN: ICD-10-PCS | Mod: S$GLB,,, | Performed by: INTERNAL MEDICINE

## 2022-09-23 PROCEDURE — 1160F PR REVIEW ALL MEDS BY PRESCRIBER/CLIN PHARMACIST DOCUMENTED: ICD-10-PCS | Mod: CPTII,S$GLB,, | Performed by: INTERNAL MEDICINE

## 2022-09-23 PROCEDURE — 85025 COMPLETE CBC W/AUTO DIFF WBC: CPT | Performed by: INTERNAL MEDICINE

## 2022-09-23 PROCEDURE — 3008F BODY MASS INDEX DOCD: CPT | Mod: CPTII,S$GLB,, | Performed by: INTERNAL MEDICINE

## 2022-09-23 PROCEDURE — 3078F PR MOST RECENT DIASTOLIC BLOOD PRESSURE < 80 MM HG: ICD-10-PCS | Mod: CPTII,S$GLB,, | Performed by: INTERNAL MEDICINE

## 2022-09-23 PROCEDURE — 1111F PR DISCHARGE MEDS RECONCILED W/ CURRENT OUTPATIENT MED LIST: ICD-10-PCS | Mod: CPTII,S$GLB,, | Performed by: INTERNAL MEDICINE

## 2022-09-23 PROCEDURE — 3066F NEPHROPATHY DOC TX: CPT | Mod: CPTII,S$GLB,, | Performed by: INTERNAL MEDICINE

## 2022-09-23 PROCEDURE — 36415 COLL VENOUS BLD VENIPUNCTURE: CPT | Performed by: INTERNAL MEDICINE

## 2022-09-23 PROCEDURE — 3078F DIAST BP <80 MM HG: CPT | Mod: CPTII,S$GLB,, | Performed by: INTERNAL MEDICINE

## 2022-09-23 PROCEDURE — 3288F FALL RISK ASSESSMENT DOCD: CPT | Mod: CPTII,S$GLB,, | Performed by: INTERNAL MEDICINE

## 2022-09-23 PROCEDURE — 3060F PR POS MICROALBUMINURIA RESULT DOCUMENTED/REVIEW: ICD-10-PCS | Mod: CPTII,S$GLB,, | Performed by: INTERNAL MEDICINE

## 2022-09-23 PROCEDURE — 1126F AMNT PAIN NOTED NONE PRSNT: CPT | Mod: CPTII,S$GLB,, | Performed by: INTERNAL MEDICINE

## 2022-09-23 PROCEDURE — 3008F PR BODY MASS INDEX (BMI) DOCUMENTED: ICD-10-PCS | Mod: CPTII,S$GLB,, | Performed by: INTERNAL MEDICINE

## 2022-09-23 PROCEDURE — 4010F ACE/ARB THERAPY RXD/TAKEN: CPT | Mod: CPTII,S$GLB,, | Performed by: INTERNAL MEDICINE

## 2022-09-23 PROCEDURE — 1159F PR MEDICATION LIST DOCUMENTED IN MEDICAL RECORD: ICD-10-PCS | Mod: CPTII,S$GLB,, | Performed by: INTERNAL MEDICINE

## 2022-09-23 PROCEDURE — 1101F PR PT FALLS ASSESS DOC 0-1 FALLS W/OUT INJ PAST YR: ICD-10-PCS | Mod: CPTII,S$GLB,, | Performed by: INTERNAL MEDICINE

## 2022-09-23 PROCEDURE — 99204 OFFICE O/P NEW MOD 45 MIN: CPT | Mod: S$GLB,,, | Performed by: INTERNAL MEDICINE

## 2022-09-23 PROCEDURE — 3044F PR MOST RECENT HEMOGLOBIN A1C LEVEL <7.0%: ICD-10-PCS | Mod: CPTII,S$GLB,, | Performed by: INTERNAL MEDICINE

## 2022-09-23 PROCEDURE — 1111F DSCHRG MED/CURRENT MED MERGE: CPT | Mod: CPTII,S$GLB,, | Performed by: INTERNAL MEDICINE

## 2022-09-27 ENCOUNTER — HOSPITAL ENCOUNTER (OUTPATIENT)
Dept: RADIOLOGY | Facility: HOSPITAL | Age: 72
Discharge: HOME | End: 2022-09-27
Attending: INTERNAL MEDICINE
Payer: MEDICARE

## 2022-09-27 VITALS — WEIGHT: 136 LBS | BODY MASS INDEX: 21.86 KG/M2 | HEIGHT: 66 IN

## 2022-09-27 DIAGNOSIS — R93.89 ABNORMAL CT SCAN, NECK: ICD-10-CM

## 2022-09-27 DIAGNOSIS — C01 PRIMARY CANCER OF BASE OF TONGUE: ICD-10-CM

## 2022-09-27 DIAGNOSIS — C32.9 LARYNX CANCER: ICD-10-CM

## 2022-09-27 LAB — GLUCOSE SERPL-MCNC: 136 MG/DL (ref 70–110)

## 2022-09-27 PROCEDURE — 78815 PET IMAGE W/CT SKULL-THIGH: CPT | Mod: TC,PO

## 2022-09-28 ENCOUNTER — OUTPATIENT CASE MANAGEMENT (OUTPATIENT)
Dept: ADMINISTRATIVE | Facility: OTHER | Age: 72
End: 2022-09-28
Payer: MEDICARE

## 2022-09-28 NOTE — PROGRESS NOTES
Outpatient Care Management  Plan of Care Follow Up Visit    Patient: Cyrus Batres Jr.  MRN: [id number]  Date of Service: 09/28/2022  Completed by: Eugenia Agudelo RN  Referral Date: 09/02/2022    Reason for Visit   Patient presents with    Update Plan Of Care       Brief Summary:   He is on hold week three with chemo due to the diarrhea. He is having diarrhea on and off. He had an appt with wound care on 09/19/22 and skin has healed. Dietician Elyssa called wife on  09/16/22 and recommended multivitamin thru peg tube and Jevity.  He can  Banatrol samples next week while there for infusion to help control diarrhea. He has gained 4 pounds weight 136. Appt with oncology on 09/29/22 and he will review the Pet Scan. Appt with radiologist on 09/29/22 for plan of care.   CM ACTION PLAN   : Follow up next week - call around 4- wife works and get off work around 3:30    Patient Summary     Involvement of Care:  Do I have permission to speak with other family members about your care?       Patient Reported Labs & Vitals:  1.  Any Patient Reported Labs & Vitals?     2.  Patient Reported Blood Pressure:     3.  Patient Reported Pulse:     4.  Patient Reported Weight (Kg):     5.  Patient Reported Blood Glucose (mg/dl):       Medical and social history was reviewed with patient and/or caregiver.     Clinical Assessment     Reviewed and provided basic information on available community resources for mental health, transportation, wellness resources, and palliative care programs with patient and/or caregiver.     Complex Care Plan     Care plan was discussed and completed today with input from patient and/or caregiver.    Patient Instructions     Instructions were provided via the [employer] patient resources and are available for the patient to view on the patient portal.      Follow up in about 8 days (around 10/6/2022) for RN Follow up call.    Todays OPCM Self-Management Care Plan was developed with the  patients/caregivers input and was based on identified barriers from todays assessment.  Goals were written today with the patient/caregiver and the patient has agreed to work towards these goals to improve his/her overall well-being. Patient verbalized understanding of the care plan, goals, and all of today's instructions. Encouraged patient/caregiver to communicate with his/her physician and health care team about health conditions and the treatment plan.  Provided my contact information today and encouraged patient/caregiver to call me with any questions as needed.

## 2022-09-28 NOTE — PROGRESS NOTES
[facility] Hematology/Oncology  PROGRESS NOTE - 2nd Follow-up Visit      Subjective:       Patient ID:   NAME: Cyrus Batres Jr. : 1950     72 y.o. 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  The patient is here today to go over the results of the recently ordered labs, tests and studies. He is here with his sister-in-law today. He saw Dr Lucero with Rad/onc this am. They are going to present his case to [facility] Tumor [facility] at [facility] [place] and plans to see Dr James about surgical options. If he is not a surgical candidate then will proceed with cisplatin and XRT combine therapy.       He is breathing ok. No HA's or CP; no pain at this time        He has portacath on left CW        Discussed covid19 and he has been vaccinated      ROS:   GEN: normal without any fever, night sweats or weight loss; he has gained some weight  HEENT: normal with no HA's, sore throat, stiff neck, changes in vision  CV: normal with no CP, SOB, PND, GRIFFIN or orthopnea  PULM: normal with no SOB, cough, hemoptysis, sputum or pleuritic pain  GI: chronic N/V with the chemotherapy; has peg tube; reflux  : normal with no hematuria, dysuria  BREAST: normal with no mass, discharge, pain  SKIN: normal with no rash, erythema, bruising, or swelling     Pain Scale:  0    Allergies:  Review of patient's allergies indicates:   Allergen Reactions    Pollen extracts     Lovastatin Rash     Not confirmed but pt skeptical       Medications:    Current Outpatient Medications:     acetaminophen (TYLENOL) 325 MG tablet, Take 325 mg by mouth every 6 (six) hours as needed for Pain., Disp: , Rfl:     diphenhydrAMINE (BENADRYL) 25 mg capsule, Take 25 mg by mouth every 6 (six) hours as needed for  Itching., Disp: , Rfl:     esomeprazole (NEXIUM) 40 MG capsule, 40 mg before breakfast., Disp: , Rfl:     levothyroxine (SYNTHROID) 100 MCG tablet, Take 1 tablet (100 mcg total) by mouth before breakfast., Disp: 30 tablet, Rfl: 11    losartan (COZAAR) 100 MG tablet, TAKE 1 TABLET BY MOUTH EVERY DAY (Patient taking differently: Take 100 mg by mouth every evening.), Disp: 90 tablet, Rfl: 3    metFORMIN (GLUCOPHAGE) 500 MG tablet, Take 1 tablet (500 mg total) by mouth 2 (two) times daily with meals., Disp: 60 tablet, Rfl: 3    OLANZapine (ZYPREXA) 5 MG tablet, Take 1 tablet (5 mg total) by mouth every evening. Take as directed on days 1-4 of your chemotherapy cycle., Disp: 30 tablet, Rfl: 5    opium tincture 10 mg/mL (morphine) Tinc, Take 1 mL (10 mg total) by mouth 4 (four) times daily as needed., Disp: 118 mL, Rfl: 0    scopolamine (TRANSDERM-SCOP) 1.3-1.5 mg (1 mg over 3 days), Place 1 patch onto the skin every 72 hours., Disp: 10 patch, Rfl: 0    traZODone (DESYREL) 50 MG tablet, TAKE 1 TABLET BY MOUTH NIGHTLY AS NEEDED FOR INSOMNIA., Disp: 90 tablet, Rfl: 1    zolpidem (AMBIEN) 5 MG Tab, 1 tablet (5 mg total) by Per G Tube route nightly as needed (difficult with sleep)., Disp: 30 tablet, Rfl: 0    dexAMETHasone (DECADRON) 4 MG Tab, Take 2 tablets (8 mg total) by mouth once daily. Take as directed on days 2, 3, and 4 of your chemotherapy cycle. (Patient not taking: No sig reported), Disp: 24 tablet, Rfl: 2    levoFLOXacin (LEVAQUIN) 750 MG tablet, 1 tablet (750 mg total) by Per G Tube route once daily. (Patient not taking: No sig reported), Disp: 7 tablet, Rfl: 0    lipase-protease-amylase 24,000-76,000-120,000 units (CREON) 24,000-76,000 -120,000 unit capsule, Take 1 capsule by mouth 3 (three) times daily with meals. for 7 days, Disp: 21 capsule, Rfl: 0    metroNIDAZOLE (FLAGYL) 500 MG tablet, 1 tablet (500 mg total) by Per G Tube route 3 (three) times daily. (Patient not taking: No sig reported), Disp: 21 tablet,  Rfl: 0    nut.tx.gluc intol,lf,soy-fiber (GLUCERNA 1.5 TRISHA) 0.08-1.5 gram-kcal/mL Liqd, 5 Cans by PEG Tube route 5 (five) times daily. (Patient not taking: No sig reported), Disp: 150 each, Rfl: 5    PMHx/PSHx Updates:  See patient's last visit with me on 9/23/2022.  See H&P on 9/23/2022        Pathology:   Cancer Staging   Larynx cancer  Staging form: Larynx - Glottis, AJCC 8th Edition  - Clinical stage from 8/6/2020: Stage II (cT2, cN0, cM0) - Signed by Fariha Muñoz NP on 8/6/2020  - Pathologic stage from 1/20/2022: Stage LOU (pT4a, pN0, cM0) - Signed by Fariha Muñoz NP on 1/20/2022  Staging form: Pharynx - P16 Negative Oropharynx, AJCC 8th Edition  - Clinical: Stage II (rcT2, cN0, cM0) - Signed by Matheus Portillo Jr., MD on 5/30/2022    Primary cancer of base of tongue  Staging form: Pharynx - P16 Negative Oropharynx, AJCC 8th Edition  - Clinical stage from 5/20/2022: Stage II (cT2, cN0, cM0, p16-) - Signed by Saúl Kessler MD on 7/7/2022      Base of Tongue Biopsy  4/27/2022:  Final Pathologic Diagnosis BIOPSY OF BASE OF THE TONGUE:   THE INFILTRATING POORLY DIFFERENTIATED SQUAMOUS CELL CARCINOMA   THE TUMOR IS P16 NEGATIVE.  THE POSITIVE AND NEGATIVE CONTROLS STAINED   APPROPRIATELY      Larynx resection/thyroidectomy 1/6/2022:     Final Pathologic Diagnosis 1. Lymph nodes, left neck levels 2,  3 and 4, dissection:   - Nineteen lymph nodes, all  negative  for metastatic carcinoma (0/19)   2. Lymph nodes, right neck levels 2,  3 and 4, dissection:   - Twenty-eight lymph nodes, all  negative  for metastatic carcinoma (0/28)   3. Possible parathyroid gland, excision:   - Benign parathyroid gland tissue (0.003 g)   4. 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 Soft tissue, posterior tracheal margin, excision:   - Benign, focally reactive respiratory mucosa with submucosal acute   inflammation,  negative  for dysplasia or malignancy   6. Soft tissue, anterior tracheal margin, excision:   - Benign respiratory mucosa with subepithelial cartilage,  negative  for   dysplasia or malignancy   7. Soft tissue, posterior tracheal margin #2, excision:   - Benign, focally reactive respiratory mucosa with submucosal acute   inflammation,  negative  for dysplasia or malignancy   8. Soft tissue, anterior tracheal margin #2, excision:   - Benign, reactive focally ulcerated respiratory mucosa with submucosal acute   inflammation,  negative  for dysplasia or malignancy             Laryngoscope 8/4/2020: (with Dr Noel):  Final Pathologic Diagnosis 1.  Left true vocal fold, biopsy:       -  Invasive moderately differentiated squamous cell carcinoma,   keratinizing type   2.  Left true vocal fold, biopsy:       -  Invasive moderately differentiated squamous cell carcinoma,   keratinizing type             Laryngoscope 3/17/2020 (with Dr Xiao):  Final Pathologic Diagnosis 1.  BIOPSY OF LEFT TRUE VOCAL CORD:   SEVERELY DYSPLASTIC APPEARING SQUAMOUS MUCOSA   INVASIVE CARCINOMA IS NOT DOCUMENTED   ON THE OTHER HAND, THESE FRAGMENTS ARE NODUL AND WITHOUT SUBMUCOSA FOR   EVALUATION; IT IS POSSIBLE THAT THEY REFLECT INVASIVE SQUAMOUS CARCINOMA   2.  BIOPSY OF LEFT ANTERIOR COMMISSURE:   MODERATE DYSPLASIA   NO INVASIVE CARCINOMA IDENTIFIED             Objective:     Vitals:  Blood pressure 113/60, pulse 72, temperature 98.4 °F (36.9 °C), resp. rate 18, height 5' 6" (1.676 m), weight 61.5 kg (135 lb 9.6 oz).    Physical Examination:   GEN: no apparent distress, comfortable; " AAOx3  HEAD: atraumatic and normocephalic  EYES: no pallor, no icterus, PERRLA  ENT: OMM, no pharyngeal erythema, external ears WNL; no nasal discharge; no thrush; s/p laryngectomy with flap  NECK: no masses, thyroid normal, trachea midline, no LAD/LN's, supple  CV: RRR with no murmur; normal pulse; normal S1 and S2; no pedal edema; portacath left CW  CHEST: Normal respiratory effort; CTAB; normal breath sounds; no wheeze or crackles  ABDOM: nontender and nondistended; soft; normal bowel sounds; no rebound/guarding; peg tube  MUSC/Skeletal: ROM normal; no crepitus; joints normal; no deformities or arthropathy  EXTREM: no clubbing, cyanosis, inflammation or swelling  SKIN: no rashes, lesions, ulcers, petechiae or subcutaneous nodules  : no mahan  NEURO: grossly intact; motor/sensory WNL; AAOx3; no tremors  PSYCH: normal mood, affect and behavior  LYMPH: normal cervical, supraclavicular, axillary and groin LN's          Labs:     Lab Results   Component Value Date    WBC 5.41 09/23/2022    HGB 9.8 (L) 09/23/2022    HCT 31.1 (L) 09/23/2022    MCV 95 09/23/2022     09/23/2022     CMP  Sodium   Date Value Ref Range Status   09/23/2022 135 (L) 136 - 145 mmol/L Final     Potassium   Date Value Ref Range Status   09/23/2022 4.1 3.5 - 5.1 mmol/L Final     Chloride   Date Value Ref Range Status   09/23/2022 100 95 - 110 mmol/L Final     CO2   Date Value Ref Range Status   09/23/2022 28 23 - 29 mmol/L Final     Glucose   Date Value Ref Range Status   09/23/2022 158 (H) 70 - 110 mg/dL Final     BUN   Date Value Ref Range Status   09/23/2022 15 8 - 23 mg/dL Final     Creatinine   Date Value Ref Range Status   09/23/2022 0.7 0.5 - 1.4 mg/dL Final     Calcium   Date Value Ref Range Status   09/23/2022 8.8 8.7 - 10.5 mg/dL Final     Total Protein   Date Value Ref Range Status   09/23/2022 6.6 6.0 - 8.4 g/dL Final     Albumin   Date Value Ref Range Status   09/23/2022 3.5 3.5 - 5.2 g/dL Final   11/18/2020 3.7 3.6 - 5.1 g/dL  Final     Comment:     For additional information, please refer to   http://education.Cozy.Tapastreet/faq/DXM553 (This link is   being provided for informational/ educational purposes only.)  This test was developed and its analytical performance   characteristics have been determined by [facility]. It has not been cleared or approved by the   [place] Food and Drug Administration. This assay has been validated   pursuant to the CLIA regulations and is used for clinical   purposes.  @ Test Performed By:  [facility]  Yo Cortes M.D.,   [address] [place] [id number]  [facility]  [id number]       Total Bilirubin   Date Value Ref Range Status   09/23/2022 0.7 0.1 - 1.0 mg/dL Final     Comment:     For infants and newborns, interpretation of results should be based  on gestational age, weight and in agreement with clinical  observations.    Premature Infant recommended reference ranges:  Up to 24 hours.............<8.0 mg/dL  Up to 48 hours............<12.0 mg/dL  3-5 days..................<15.0 mg/dL  6-29 days.................<15.0 mg/dL       Alkaline Phosphatase   Date Value Ref Range Status   09/23/2022 67 55 - 135 U/L Final     AST   Date Value Ref Range Status   09/23/2022 18 10 - 40 U/L Final     ALT   Date Value Ref Range Status   09/23/2022 14 10 - 44 U/L Final     Anion Gap   Date Value Ref Range Status   09/23/2022 7 (L) 8 - 16 mmol/L Final     eGFR if    Date Value Ref Range Status   07/29/2022 >60.0 >60 mL/min/1.73 m^2 Final     eGFR if non    Date Value Ref Range Status   07/29/2022 >60.0 >60 mL/min/1.73 m^2 Final     Comment:     Calculation used to obtain the estimated glomerular filtration  rate (eGFR) is the CKD-EPI equation.                Radiology/Diagnostic Studies:      CTA Neck    Result Date: 9/20/2022  EXAMINATION: CTA NECK CLINICAL HISTORY: Head/neck cancer,  monitor;Malignant neoplasm of base of tongue TECHNIQUE: CT angiogram was performed from the level of the scott to the EAC following the IV administration of 75mL of Omnipaque 350.   Sagittal and coronal reconstructions and maximum intensity projection reconstructions were performed. Arterial stenosis percentages are based on NASCET measurement criteria. COMPARISON: CTA neck dated 05/20/2022 FINDINGS: Aortic arch and great vessels: There is a conventional left-sided 3 vessel arch.  The aortic arch is widely patent.  There is stable soft and calcified plaque in the proximal left subclavian artery with a similar appearance the prior study and possibly within radiation field but without critical stenosis or occlusion.  The right subclavian artery is patent.  The brachiocephalic trunk and origin of the left common carotid artery are patent. Carotid arteries Right carotid artery: There is calcified plaque scattered in the right common carotid artery without critical stenosis, occlusion or change.  There is no measurable stenosis of the internal carotid artery which is patent to the skull base. Left carotid artery: There is mild plaque scattered in the left common carotid artery without change and without critical stenosis or occlusion.  There is no measurable stenosis of the internal carotid artery. Vertebral arteries: The left vertebral artery is dominant and patent throughout its course in the neck.  The right vertebral artery is hypoplastic but patent.  There is no critical stenosis, occlusion, thrombus or dissection.  There is no change. The study extends intracranially.  There is calcified plaque in the V4 segment of the left vertebral artery resulting in a moderate to marked short segment nonocclusive stenosis.  The right vertebral artery partially terminates in a posteroinferior cerebellar artery with a short segment moderate to marked stenosis of the very distal right vertebral artery.  Some flow within the  distal right vertebral artery may represent retrograde flow from the dominant left vertebral artery.  The basilar artery is patent.  The origins of the superior cerebellar and posterior cerebral artery on the right are patent.  There is fetal origin of the left posterior cerebral artery which is patent. There is plaque in the cavernous segments of both internal carotid arteries but there is no critical stenosis or large vessel occlusion of the anterior circulation vessels.  There is no large aneurysm. Other: There is a similar appearance of the included upper lungs with pleural and parenchymal changes anteriorly in the upper lobes bilaterally.  As previously discussed, timing of the contrast bolus is performed during the arterial phase for CTA neck.  Therefore, enhancement of the soft tissues is somewhat suboptimal due to this technique. There has been a large region of soft tissue resection in the anterior mid and lower neck soft tissues with continued open defect in the upper chest and lower neck above the manubrium.  Soft tissue seen along the left posterolateral border of the trachea could represent secretions or mucus.  This was present previously. Redemonstrated is a large heterogeneously enhancing lesion centered at the level of the tongue base and floor of the mouth centrally into the left.  There is scattered calcifications.  The prior CTA demonstrated regions of central necrosis which are no longer definitely present but diffusely heterogeneous density and enhancement likely represents regions of necrosis.  This large heterogeneously enhancing soft tissue mass extends more anteriorly in the floor of the mouth on the left and measures at least 5.6 cm in greatest anterior to posterior dimension with 3.6 cm transverse dimension.  This does extend anteriorly to the medial margin of the body of the mandible on the left. There are post treatment changes with stranding in the neck fat.     1. Similar appearance  of the neck vessels when compared to the prior CTA neck with mild narrowing at the origin of the left subclavian artery.  There is no critical stenosis, occlusion, thrombosis or dissection involving the cervical vertebral or carotid arteries. 2. Larger mass within the hypopharynx and tongue base extending anteriorly to the floor of the mouth on the left to the medial margin of the body of the mandible without obvious bony destruction. 3. There is nonocclusive stenosis of the distal V4 segment of the left vertebral artery without change. 4. There is no large vessel occlusion or critical stenosis of the anterior circulation. 5. There is developmental variation with fetal origin of the left posterior cerebral artery. Electronically signed by: Rex Joyner MD Date:    09/20/2022 Time:    11:31    CT Abdomen Pelvis With Contrast    Result Date: 9/1/2022  CMS MANDATED QUALITY DATA - CT RADIATION - 436 All CT scans at this facility utilize dose modulation, iterative reconstruction, and/or weight based dosing when appropriate to reduce radiation dose to as low as reasonably achievable. Reason: abdominal pain partial history of laryngeal carcinoma TECHNIQUE: CT abdomen and pelvis with 100 mL Omnipaque 350. COMPARISON: PET/CT 5/13/2022 CT ABDOMEN: Coronary artery calcification and extremely tiny hiatal hernia incidentally noted. Visualized lung bases are clear. Liver, gallbladder, pancreas, spleen, adrenals, and kidneys are normal. Mild aortoiliac calcifications present. Gastrostomy tube tip lies in distal gastric body. Small intestines are unremarkable. Mild wall thickening affects the ascending colon with pneumatosis intestinalis diffusely affecting the ascending colon. No other free intraperitoneal gas or, and remainder of colon is normal. A normal appendix is present. The major mesenteric vascular structures are patent. No acute osseous abnormality. CT PELVIS: Prostate is slightly enlarged. Bladder is normal. No free  pelvic fluid. Tiny right and small to moderate left fat-containing inguinal hernias are present. No acute osseous abnormality. IMPRESSION: 1. Pneumatosis intestinalis affecting the ascending colon with associated mild wall thickening. Etiology of this is undetermined. Potential considerations include intestinal ischemia or pneumatosis related to chemotherapy. Clinical and laboratory correlation is needed to assess significance. No portal venous gas or free intraperitoneal air however. 2. Coronary artery calcifications Electronically signed by:  Nael Hui MD  9/1/2022 6:20 PM CDT Workstation: [id number]    NM PET CT Routine Skull to Mid Thigh    Result Date: 9/27/2022  PET CT WITH IMAGE FUSION HISTORY:  restage tongue cancer RESTAGING...  HX: TONGUE CA.  DX: April 2022.  LAST CHEMO TREATMENT WAS 3WKS AGO.  EVALUATE TX RESPONSE.   ALSO HX OF LARYNGEAL CA IN AUGUST 2020.  MULTIPLE SURGERIES: LARYNGECTOMY, THYROIDECTOMY & TRACHEOSTOMY.  RAD TX WAS COMPLETED IN NOV 2020. TECHNIQUE: Following IV administration of 11.2 mCi of F-18 labeled FDG into right antecubital fossa and a 60 minute delay, PET CT was performed from the vertex of the skull through the proximal thighs with an integrated PET CT scanner with image fusion. CT images were obtained to aid in attenuation correction and PET localization. The patient's serum glucose at the time of the exam was 136 mg/dL. COMPARISON: PET/CT 5/13/2022 FINDINGS: Poorly characterized soft tissue mass involving base of tongue approximately measures 4.3 x 2.8 cm (series 3 image 65) appearing similar to the prior exam. This reaches current max SUV of 11.8, not significantly changed from prior of 11.4. No other abnormal FDG activity. Intracranial compartment is unremarkable. Trace bilateral maxillary sinus mucosal thickening is present. Postoperative changes of laryngectomy and tracheostomy are present. Surgical clips occur throughout the neck. No definite enlarged cervical or  supraclavicular lymph node, with evaluation limited by lack of IV contrast. Left subclavian port catheter tip terminates in SVC. Diffuse coronary artery calcifications are present. No enlarged mediastinal or axillary lymph nodes. No pulmonary nodule or mass. Gastrostomy tube tip lies in gastric body. Moderate aortoiliac calcifications are present. Small fat-containing left inguinal hernia incidentally noted. Mild degenerative changes affect the spine. No suspicious osseous abnormality. IMPRESSION: 1. No significant change in the FDG avid mass involving the tongue base, remaining characteristic of malignancy. 2. No new FDG avid malignancy or metastatic disease. Electronically signed by:  Nael Hui MD  9/27/2022 2:38 PM CDT Workstation: [id number]    CT Abdomen Pelvis  Without Contrast    Result Date: 9/4/2022  CMS MANDATED QUALITY DATA - CT RADIATION  436 All CT scans at this facility utilize dose modulation, iterative reconstruction, and/or weight based dosing when appropriate to reduce radiation dose to as low as reasonably achievable. CT ABDOMEN PELVIS WITHOUT IV CONTRAST CLINICAL HISTORY: 72 years Male Follow-up pneumatosis COMPARISON: CT abdomen and pelvis September 1, 2022 FINDINGS: Imaging through the lower thorax demonstrates subsegmental atelectasis of the dependent lower lobes. Coronary artery calcification. Bone window images show no acute or aggressive osseous abnormality. Transitional lumbosacral vertebral body. On this unenhanced exam, no focal hepatic lesion. Gallbladder and biliary tree are unremarkable. Spleen appears normal. Pancreas is unremarkable. No adrenal lesion. No renal calculi or hydronephrosis. Ureters are normal in caliber. Urinary bladder is within normal limits. Gastrostomy tube is in place within the stomach. Stomach is largely collapsed. No evidence of small bowel obstruction. Pneumatosis and pericolonic abscess involving the ascending colon is redemonstrated, slightly diminished  compared to prior. Mild wall thickening throughout the colon. No free fluid or free air within the abdomen or pelvis. Aortoiliac atherosclerotic calcification. No pathologically enlarged lymph nodes within the abdomen or pelvis. Bilateral fat-containing inguinal hernias, larger on the left. IMPRESSION: Pneumatosis and pericolonic gas about the ascending colon has decreased in volume compared to prior. Persistent colonic wall thickening which could indicate colitis. Coronary and aortic atherosclerosis. Gastrostomy tube is within the stomach. Bilateral inguinal hernias. Electronically signed by:  Jose De Jesus Oleary MD  9/4/2022 4:06 PM CDT Workstation: [id number]    CTA neck  5/20/2022:     IMPRESSION:  Persistent mass within the hypopharynx consistent with malignancy.  By my measurements it is larger than on April 24, 2022 with central necrosis.  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 This is similar to the previous April 2022 study.  No evidence of arterial compromise related to malignancy.     PET 5/13/2022:     IMPRESSION:  1. Postoperative changes of prior laryngectomy and lymph node resection/radiation.  2. Masslike FDG avid lesion to the left of midline at the tongue base concerning for residual/recurrent disease.  3. Adjacent confluent nodular extension along the inferior left side of the mass.  4. No FDG avid lymphadenopathy or convincing additional sites of disease in the chest, abdomen or pelvis.     CT head 5/10/2022:  FINDINGS: Comparison to multiple prior exams. There is no acute intracranial hemorrhage, with no mass effect or abnormal extra-axial fluid. Mild scattered areas of nonspecific hypoattenuation involve the deep periventricular white matter, with gray-white differentiation maintained.     There is mild generalized prominence of the cortical sulci and ventricles. The cerebellum and brainstem are unremarkable. There are carotid siphon  vascular calcifications. The visualized paranasal sinuses and mastoid air cells are clear. There is no acute calvarial fracture or scalp hematoma.     IMPRESSION: No acute intracranial hemorrhage or acute calvarial fracture     CT soft neck 4/24/2022;     Oral tongue/tongue base:  At the base of the tongue on the left there are findings of a heterogeneously enhancing mass measuring 2.1 cm highly concerning for a neoplastic process.     True and false cords:  Patient status post laryngectomy which has been performed in the interim. Soft tissue stranding in the lower neck likely related to a previous flat reconstruction. No enhancement or lymphadenopathy within the lower neck.     Lymph node assessment:Negative     Surrounding soft tissues:  Negative     Vasculature:  Negative     Osseous structures:  Negative     Lung apices:  Scarring involving the lung apices bilaterally likely related to previous radiation.     IMPRESSION:  1. Postoperative and radiation changes involving the lower neck.  2. Findings highly concerning for a developing mass at the left base of the tongue enhancing 2.1 cm region. Direct visualization in this region would be of benefit.        CT soft neck  12/24/2021  IMPRESSION:  1.  Laryngeal mass as on prior exam. Laryngeal airway narrowing has not changed.  2.  No evidence for active hemorrhage.  3.  Partially visualized patchy groundglass infiltrates in both upper lobes. Also see CT chest report     CTA Chest 12/24/2021:  IMPRESSION:     1.  No pulmonary embolism.     2.  Soft tissue stranding in the region of the strap musculature with ill-defined hypodensity measuring 2.8 x 1.4 cm in the cervical midline.  Findings concerning for infectious process or abscess. Neoplastic process could have similar imaging characteristics.     3.  Suggested erosion of the proximal right parasymphyseal aspect of the thyroid shield.  Correlated with CT soft tissue neck findings. Findings could represent  osteomyelitis. Neoplastic process cannot be excluded.     4.  Hepatic steatosis.  5.  Thickening of the gastric wall. Prominence of perigastric vasculature. Small hiatal hernia.     6.  Moderate stool burden.  Correlate for constipation.        PET 12/15/2021:  IMPRESSION:     Substantial qualitative and quantitative increase of hypermetabolic FDG activity associated with the larynx in this patient with known supraglottic neoplasm.     No evidence of FDG avid metastatic disease involving neck, chest, abdomen or pelvis.     PET 8/21/2020:     Impression:     1. Intensely hypermetabolic plaque-like mass along the left true vocal cord, compatible with known laryngeal carcinoma.  2. No findings of regional metastatic disease in the neck, or distant metastatic disease.        CT Chest 8/10/2020:     IMPRESSION:     No metastatic disease within the chest.  Three-vessel coronary artery calcification. Nondilated cardiac  chambers     CT Soft neck 7/22/2020:  IMPRESSION:  1. Slight interval increase in size of the previously described  enhancing nodule along the anterior left vocal cord.  2. No pathologic lymphadenopathy.  3. Additional and incidental findings as noted above.     CT Soft neck  3/16/2020:     Impression:     6 mm enhancing focus involving the superior aspect of the left focal cord near the anterior commisure.  Recommend direct visualization for further evaluation of laryngeal polyp     Question of 2 mm submucosal lipoma involving the midportion of the superior aspect of the left vocal cord.     Mild arteriosclerosis involving the brachiocephalic arteries and carotid arteries     Mild degenerative change of the cervical spine        I have reviewed all available lab results and radiology reports.    Assessment/Plan:   (1) 72 y.o. 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     - Patient  has been under the care of Dr Khoobehi with [facility] and Dr Portillo with rad/onc.   - He was recently found to have a new primary cancer involving the base of his tongue in April 2022. He was deemed not to be a candidate for any further radiation and did not want to undergo any further surgery as this would necessitate a glossectomy procedure.   - He has been on adjuvant chemotherapy per direction of Dr Khoobehi and has had 3 cycles. His therapy course has been complicated with persistent diarrhea and N/V.      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 He saw Dr Lucero with Rad/onc this am. They are going to present [place] case [place] [facility] [place] to [place] Dr James about surgical options. If he is not a surgical candidate then will proceed with cisplatin and XRT combine therapy.      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 He had a CT scan on 3/16/2020 which showed a 6mm nodule on the left vocal cord. A repeat Ct scan four months later on 7/22/2020 showed the nodule enlarged to 1.4 x 1.1 cm in size. Patient was then seen by Dr Noel and underwent repeat scope in Aug 2020 who found a bulky deeply invading tumor which was debulked and the  pathology coming back moderately differentiated SCCA without lymphovascular or perineural invasion. Hs case was subsequently presented to the tumor board and the consensus was to proceed with radiation. He completed XRT on 10/30/2020 and proceeded with regular laryngoscopy surveillance. He eventually required salvage laryngectomy and neck dissection with flap with Dr James on Jan 6th 2022. 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 Fifty lymph nodes were removed which were all negative. Pathology TNM was pT4a, pN0. Patient did not require any adjuvant therapy afterwards.      (2) HTN and hypercholesterolemia     (3) Paroxysmal atrial fibrillation, V tach     (4) DM II     (5) B12 deficiency      (6) Fatty liver disease, GERD           VISIT DIAGNOSES:      Larynx cancer    Primary cancer of base of tongue    Iron deficiency anemia due to chronic blood loss    Vitamin B12 deficiency    Fatty liver disease, nonalcoholic    Abnormal CT scan, neck        PLAN:  Proceed with H&N Tumor board evaluation today and await their recommendations - if he is not operable candidate, then will proceed with XRT and cisplatin chemotherapy   F/u with Dr James with ENT and Dr Lucero with rad/onc  Check labs weekly  F/u with PCP, ENT, etc     RTC in  1 week  Fax note to Khoobehi, Aurash, MD, Bandar/Dameon Lucero Hasney, Yesenia Gomez       Discussion:     COVID-19 Discussion:     I had long discussion with patient and any applicable family about the COVID-19 coronavirus epidemic and the recommended precautions with regard to cancer and/or hematology patients. I have re-iterated the CDC recommendations for adequate hand washing, use of hand -like products, and coughing into elbow, etc. In addition, especially for our patients who are on chemotherapy and/or our otherwise  "immunocompromised patients, I have recommended avoidance of crowds, including movie theaters, restaurants, churches, etc. I have recommended avoidance of any sick or symptomatic family members and/or friends. I have also recommended avoidance of any raw and unwashed food products, and general avoidance of food items that have not been prepared by themselves. The patient has been asked to call us immediately with any symptom developments, issues, questions or other general concerns.         Pathology Discussion:     I reviewed and discussed the pathology report(s) and radiograph reports (if available) in as simple to understand and/or laymen's terms to the best of my ability. I had an indepth conversation with the patient and went over the patient's individual diagnosis based on the information that was currently available. I discussed the TNM staging process with regard to the patient's particular cancer type, and the calculated stage based on the currently available TNM data and literature. I discussed the available prognostic data with regard to the current staging information and how it relates to the prognosis of their particular neoplastic process.          NCCN Guidelines:     I discussed the available treatment option(s) in accordance with the latest literature from the NCCN Clinical Practice Guidelines for the patient's particular type of cancer disorder. The NCCN Guidelines provide a "document evidence-based (and) consensus-driven management" of the care of oncology patients. The treatment recommendations were made not only in accordance to the NCCN guidelines, but also factored in to account the patient's overall age, condition, performance status and their medical co-morbidities. I went over the risks and benefits of the the treatment options (if any could be made) with regard to their particular cancer type, their cancer stage, their age, and their co-morbidities.         I have explained and the patient " understands all of  the current recommendation(s). I have answered all of their questions to the best of my ability and to their complete satisfaction.         I spent over 25 mins of time with the patient. Reviewed results of the recently ordered labs, tests and studies; made directives with regards to the results. Over half of this time was spent couseling and coordinating care.    I have explained all of the above in detail and the patient understands all of the current recommendation(s). I have answered all of their questions to the best of my ability and to their complete satisfaction.   The patient is to continue with the current management plan.            Electronically signed by Venu Tamez MD

## 2022-09-29 ENCOUNTER — OFFICE VISIT (OUTPATIENT)
Dept: RADIATION ONCOLOGY | Facility: CLINIC | Age: 72
End: 2022-09-29
Payer: MEDICARE

## 2022-09-29 ENCOUNTER — PATIENT MESSAGE (OUTPATIENT)
Dept: OTOLARYNGOLOGY | Facility: CLINIC | Age: 72
End: 2022-09-29
Payer: MEDICARE

## 2022-09-29 ENCOUNTER — OFFICE VISIT (OUTPATIENT)
Dept: HEMATOLOGY/ONCOLOGY | Facility: CLINIC | Age: 72
End: 2022-09-29
Payer: MEDICARE

## 2022-09-29 VITALS
HEIGHT: 66 IN | SYSTOLIC BLOOD PRESSURE: 113 MMHG | TEMPERATURE: 98 F | RESPIRATION RATE: 18 BRPM | BODY MASS INDEX: 21.8 KG/M2 | WEIGHT: 135.63 LBS | DIASTOLIC BLOOD PRESSURE: 60 MMHG | HEART RATE: 72 BPM

## 2022-09-29 VITALS
BODY MASS INDEX: 21.94 KG/M2 | HEART RATE: 77 BPM | OXYGEN SATURATION: 100 % | DIASTOLIC BLOOD PRESSURE: 64 MMHG | SYSTOLIC BLOOD PRESSURE: 87 MMHG | HEIGHT: 66 IN | WEIGHT: 136.5 LBS | RESPIRATION RATE: 18 BRPM

## 2022-09-29 DIAGNOSIS — C01 PRIMARY CANCER OF BASE OF TONGUE: ICD-10-CM

## 2022-09-29 DIAGNOSIS — E53.8 VITAMIN B12 DEFICIENCY: ICD-10-CM

## 2022-09-29 DIAGNOSIS — K76.0 FATTY LIVER DISEASE, NONALCOHOLIC: ICD-10-CM

## 2022-09-29 DIAGNOSIS — D50.0 IRON DEFICIENCY ANEMIA DUE TO CHRONIC BLOOD LOSS: ICD-10-CM

## 2022-09-29 DIAGNOSIS — C32.9 LARYNX CANCER: ICD-10-CM

## 2022-09-29 DIAGNOSIS — C32.9 LARYNX CANCER: Primary | ICD-10-CM

## 2022-09-29 DIAGNOSIS — R93.89 ABNORMAL CT SCAN, NECK: ICD-10-CM

## 2022-09-29 DIAGNOSIS — D50.0 IRON DEFICIENCY ANEMIA DUE TO CHRONIC BLOOD LOSS: Primary | ICD-10-CM

## 2022-09-29 PROCEDURE — 3074F SYST BP LT 130 MM HG: CPT | Mod: CPTII,S$GLB,, | Performed by: INTERNAL MEDICINE

## 2022-09-29 PROCEDURE — 1111F PR DISCHARGE MEDS RECONCILED W/ CURRENT OUTPATIENT MED LIST: ICD-10-PCS | Mod: CPTII,S$GLB,, | Performed by: INTERNAL MEDICINE

## 2022-09-29 PROCEDURE — 3078F DIAST BP <80 MM HG: CPT | Mod: CPTII,S$GLB,, | Performed by: INTERNAL MEDICINE

## 2022-09-29 PROCEDURE — 3044F HG A1C LEVEL LT 7.0%: CPT | Mod: CPTII,S$GLB,, | Performed by: RADIOLOGY

## 2022-09-29 PROCEDURE — 1101F PT FALLS ASSESS-DOCD LE1/YR: CPT | Mod: CPTII,S$GLB,, | Performed by: INTERNAL MEDICINE

## 2022-09-29 PROCEDURE — 3008F BODY MASS INDEX DOCD: CPT | Mod: CPTII,S$GLB,, | Performed by: RADIOLOGY

## 2022-09-29 PROCEDURE — 1111F PR DISCHARGE MEDS RECONCILED W/ CURRENT OUTPATIENT MED LIST: ICD-10-PCS | Mod: CPTII,S$GLB,, | Performed by: RADIOLOGY

## 2022-09-29 PROCEDURE — 3078F PR MOST RECENT DIASTOLIC BLOOD PRESSURE < 80 MM HG: ICD-10-PCS | Mod: CPTII,S$GLB,, | Performed by: INTERNAL MEDICINE

## 2022-09-29 PROCEDURE — 3044F HG A1C LEVEL LT 7.0%: CPT | Mod: CPTII,S$GLB,, | Performed by: INTERNAL MEDICINE

## 2022-09-29 PROCEDURE — 1126F PR PAIN SEVERITY QUANTIFIED, NO PAIN PRESENT: ICD-10-PCS | Mod: CPTII,S$GLB,, | Performed by: RADIOLOGY

## 2022-09-29 PROCEDURE — 3060F PR POS MICROALBUMINURIA RESULT DOCUMENTED/REVIEW: ICD-10-PCS | Mod: CPTII,S$GLB,, | Performed by: RADIOLOGY

## 2022-09-29 PROCEDURE — 3060F POS MICROALBUMINURIA REV: CPT | Mod: CPTII,S$GLB,, | Performed by: INTERNAL MEDICINE

## 2022-09-29 PROCEDURE — 4010F ACE/ARB THERAPY RXD/TAKEN: CPT | Mod: CPTII,S$GLB,, | Performed by: INTERNAL MEDICINE

## 2022-09-29 PROCEDURE — 3288F FALL RISK ASSESSMENT DOCD: CPT | Mod: CPTII,S$GLB,, | Performed by: INTERNAL MEDICINE

## 2022-09-29 PROCEDURE — 3044F PR MOST RECENT HEMOGLOBIN A1C LEVEL <7.0%: ICD-10-PCS | Mod: CPTII,S$GLB,, | Performed by: INTERNAL MEDICINE

## 2022-09-29 PROCEDURE — 3066F PR DOCUMENTATION OF TREATMENT FOR NEPHROPATHY: ICD-10-PCS | Mod: CPTII,S$GLB,, | Performed by: INTERNAL MEDICINE

## 2022-09-29 PROCEDURE — 1111F DSCHRG MED/CURRENT MED MERGE: CPT | Mod: CPTII,S$GLB,, | Performed by: RADIOLOGY

## 2022-09-29 PROCEDURE — 1101F PR PT FALLS ASSESS DOC 0-1 FALLS W/OUT INJ PAST YR: ICD-10-PCS | Mod: CPTII,S$GLB,, | Performed by: INTERNAL MEDICINE

## 2022-09-29 PROCEDURE — 1160F RVW MEDS BY RX/DR IN RCRD: CPT | Mod: CPTII,S$GLB,, | Performed by: INTERNAL MEDICINE

## 2022-09-29 PROCEDURE — 3074F SYST BP LT 130 MM HG: CPT | Mod: CPTII,S$GLB,, | Performed by: RADIOLOGY

## 2022-09-29 PROCEDURE — 4010F PR ACE/ARB THEARPY RXD/TAKEN: ICD-10-PCS | Mod: CPTII,S$GLB,, | Performed by: RADIOLOGY

## 2022-09-29 PROCEDURE — 3074F PR MOST RECENT SYSTOLIC BLOOD PRESSURE < 130 MM HG: ICD-10-PCS | Mod: CPTII,S$GLB,, | Performed by: INTERNAL MEDICINE

## 2022-09-29 PROCEDURE — 99215 PR OFFICE/OUTPT VISIT, EST, LEVL V, 40-54 MIN: ICD-10-PCS | Mod: S$GLB,,, | Performed by: INTERNAL MEDICINE

## 2022-09-29 PROCEDURE — 1126F AMNT PAIN NOTED NONE PRSNT: CPT | Mod: CPTII,S$GLB,, | Performed by: RADIOLOGY

## 2022-09-29 PROCEDURE — 3066F NEPHROPATHY DOC TX: CPT | Mod: CPTII,S$GLB,, | Performed by: INTERNAL MEDICINE

## 2022-09-29 PROCEDURE — 1101F PT FALLS ASSESS-DOCD LE1/YR: CPT | Mod: CPTII,S$GLB,, | Performed by: RADIOLOGY

## 2022-09-29 PROCEDURE — 3060F PR POS MICROALBUMINURIA RESULT DOCUMENTED/REVIEW: ICD-10-PCS | Mod: CPTII,S$GLB,, | Performed by: INTERNAL MEDICINE

## 2022-09-29 PROCEDURE — 99215 PR OFFICE/OUTPT VISIT, EST, LEVL V, 40-54 MIN: ICD-10-PCS | Mod: S$GLB,,, | Performed by: RADIOLOGY

## 2022-09-29 PROCEDURE — 1160F PR REVIEW ALL MEDS BY PRESCRIBER/CLIN PHARMACIST DOCUMENTED: ICD-10-PCS | Mod: CPTII,S$GLB,, | Performed by: INTERNAL MEDICINE

## 2022-09-29 PROCEDURE — 3288F FALL RISK ASSESSMENT DOCD: CPT | Mod: CPTII,S$GLB,, | Performed by: RADIOLOGY

## 2022-09-29 PROCEDURE — 99215 OFFICE O/P EST HI 40 MIN: CPT | Mod: S$GLB,,, | Performed by: INTERNAL MEDICINE

## 2022-09-29 PROCEDURE — 1126F PR PAIN SEVERITY QUANTIFIED, NO PAIN PRESENT: ICD-10-PCS | Mod: CPTII,S$GLB,, | Performed by: INTERNAL MEDICINE

## 2022-09-29 PROCEDURE — 4010F PR ACE/ARB THEARPY RXD/TAKEN: ICD-10-PCS | Mod: CPTII,S$GLB,, | Performed by: INTERNAL MEDICINE

## 2022-09-29 PROCEDURE — 3078F DIAST BP <80 MM HG: CPT | Mod: CPTII,S$GLB,, | Performed by: RADIOLOGY

## 2022-09-29 PROCEDURE — 1159F MED LIST DOCD IN RCRD: CPT | Mod: CPTII,S$GLB,, | Performed by: INTERNAL MEDICINE

## 2022-09-29 PROCEDURE — 3044F PR MOST RECENT HEMOGLOBIN A1C LEVEL <7.0%: ICD-10-PCS | Mod: CPTII,S$GLB,, | Performed by: RADIOLOGY

## 2022-09-29 PROCEDURE — 3008F BODY MASS INDEX DOCD: CPT | Mod: CPTII,S$GLB,, | Performed by: INTERNAL MEDICINE

## 2022-09-29 PROCEDURE — 99215 OFFICE O/P EST HI 40 MIN: CPT | Mod: S$GLB,,, | Performed by: RADIOLOGY

## 2022-09-29 PROCEDURE — 3078F PR MOST RECENT DIASTOLIC BLOOD PRESSURE < 80 MM HG: ICD-10-PCS | Mod: CPTII,S$GLB,, | Performed by: RADIOLOGY

## 2022-09-29 PROCEDURE — 3288F PR FALLS RISK ASSESSMENT DOCUMENTED: ICD-10-PCS | Mod: CPTII,S$GLB,, | Performed by: RADIOLOGY

## 2022-09-29 PROCEDURE — 1111F DSCHRG MED/CURRENT MED MERGE: CPT | Mod: CPTII,S$GLB,, | Performed by: INTERNAL MEDICINE

## 2022-09-29 PROCEDURE — 3008F PR BODY MASS INDEX (BMI) DOCUMENTED: ICD-10-PCS | Mod: CPTII,S$GLB,, | Performed by: INTERNAL MEDICINE

## 2022-09-29 PROCEDURE — 3074F PR MOST RECENT SYSTOLIC BLOOD PRESSURE < 130 MM HG: ICD-10-PCS | Mod: CPTII,S$GLB,, | Performed by: RADIOLOGY

## 2022-09-29 PROCEDURE — 3060F POS MICROALBUMINURIA REV: CPT | Mod: CPTII,S$GLB,, | Performed by: RADIOLOGY

## 2022-09-29 PROCEDURE — 3066F NEPHROPATHY DOC TX: CPT | Mod: CPTII,S$GLB,, | Performed by: RADIOLOGY

## 2022-09-29 PROCEDURE — 4010F ACE/ARB THERAPY RXD/TAKEN: CPT | Mod: CPTII,S$GLB,, | Performed by: RADIOLOGY

## 2022-09-29 PROCEDURE — 1101F PR PT FALLS ASSESS DOC 0-1 FALLS W/OUT INJ PAST YR: ICD-10-PCS | Mod: CPTII,S$GLB,, | Performed by: RADIOLOGY

## 2022-09-29 PROCEDURE — 1159F PR MEDICATION LIST DOCUMENTED IN MEDICAL RECORD: ICD-10-PCS | Mod: CPTII,S$GLB,, | Performed by: INTERNAL MEDICINE

## 2022-09-29 PROCEDURE — 3008F PR BODY MASS INDEX (BMI) DOCUMENTED: ICD-10-PCS | Mod: CPTII,S$GLB,, | Performed by: RADIOLOGY

## 2022-09-29 PROCEDURE — 3288F PR FALLS RISK ASSESSMENT DOCUMENTED: ICD-10-PCS | Mod: CPTII,S$GLB,, | Performed by: INTERNAL MEDICINE

## 2022-09-29 PROCEDURE — 3066F PR DOCUMENTATION OF TREATMENT FOR NEPHROPATHY: ICD-10-PCS | Mod: CPTII,S$GLB,, | Performed by: RADIOLOGY

## 2022-09-29 PROCEDURE — 1126F AMNT PAIN NOTED NONE PRSNT: CPT | Mod: CPTII,S$GLB,, | Performed by: INTERNAL MEDICINE

## 2022-09-29 NOTE — PROGRESS NOTES
Patient informed that we do not have auth for his last dose of venofer.     We will draw his iron labs next week to see if he needs more iron infusions.     Patient aware that appt tomorrow is cancelled.

## 2022-09-29 NOTE — PROGRESS NOTES
Cyrus Batres Jr.  [id number]  1950 9/29/2022  Venu Tamez Md  [address]  [address]  [place] [address]    DIAGNOSIS: Recurrent SCC of SGL/OP    REASON FOR VISIT: Routine scheduled follow-up.     HISTORY OF PRESENT ILLNESS:   Mr. Baters is a 72M nonsmoker who previously has undergone definitive RT w/ accelerated fractionation in 2020 for a SCC of his SGL, however he recurred w/in a year and then underwent TL on 1/6/22.  Clear SM's were achieved w/ (-) neck dissection  [[id number]].    More recently in May of this year, he was found on CT and DL to have a progressive mass of his BoT that was proven on bx to be p16(-) SCC.  He was offered surgery at that time, however he declined.  He was also offered repeat RT by Bandar Siddiqi, however never returned for recommended f/u.      He ultimately opted for systemic therapy via carbo/5FU/pembro, however after 3 cycles was noted to have progression of his disease via PET/CT.    The pt has now been referred back to [facility] for eval/discussion re: repeat RT/CRT.    He is nonverbal, as of May/Jun per patient, and fully PEG tube dependant over that timeframe as well due to OP mass.  He denies any severe pain, wt loss, airway distress, bleeding, f/c/n/v/d/cp/sob.      REVIEW OF SYSTEMS:  A complete review of systems was performed and the patient denies any acute concerns/changes other than per HPI    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    Postoperative hypothyroidism 7/7/2022     Past Surgical History:   Procedure Laterality Date    DIRECT LARYNGOBRONCHOSCOPY N/A 12/27/2021    Procedure: LARYNGOSCOPY, DIRECT, WITH BRONCHOSCOPY;  Surgeon: Flex Espinosa MD;  Location: [facility] OR [address] [place] [address];  Service: ENT;  Laterality: N/A;    DISSECTION OF NECK Bilateral 1/6/2022     Procedure: DISSECTION, NECK;  Surgeon: Jesse James MD;  [facility];  Service: ENT;  Laterality: Bilateral;    FLAP PROCEDURE Right 1/6/2022    Procedure: CREATION, FREE FLAP;  Surgeon: Alise Hart MD;  [facility];  Service: ENT;  Laterality: Right;  Ischemic start 1351  Ischemic stop 1502    INSERTION OF TUNNELED CENTRAL VENOUS CATHETER (CVC) WITH SUBCUTANEOUS PORT N/A 6/9/2022    Procedure: [id number];  Surgeon: Jesus Viera MD;  Location: [facility] OR;  Service: General;  Laterality: N/A;    LARYNGECTOMY N/A 1/6/2022    Procedure: LARYNGECTOMY;  Surgeon: Jesse James MD;  [facility];  Service: ENT;  Laterality: N/A;    LARYNGOSCOPY N/A 8/4/2020    Procedure: Suspension microlaryngoscopy with biopsy, possible KTP laser treatment/excision;  Surgeon: Stew Noel MD;  [address];  Service: ENT;  Laterality: N/A;  Microscope, telescopes, tower, microinstruments, KTP laser, rep conf# [id number] IC 7/28.    LARYNGOSCOPY N/A 3/16/2021    Procedure: Suspension microlaryngoscopy with excision of lesion, possible CO2 laser;  Surgeon: Stew Noel MD;  Location: [address];  Service: ENT;  Laterality: N/A;  Microscope, telescopes, tower, microinstruments, CO2 laser, rep conf# [id number] IC 3/4.    LARYNGOSCOPY N/A 4/1/2021    Procedure: Suspension microlaryngoscopy with KTP laser excision of lesion;  Surgeon: Stew Noel MD;  [facility];  Service: ENT;  Laterality: N/A;  Microscope, telescopes, tower, microinstruments, 70 degree scope, vocal fold , KTP laser, rep conf# [id number] BC    LARYNGOSCOPY N/A 12/9/2021    Procedure: Suspension microlaryngoscopy with biopsy;  Surgeon: Stew Noel MD;  [facility];  Service: ENT;  Laterality: N/A;  Microscope, telescopes, tower, microinstruments    LARYNGOSCOPY N/A 1/6/2022    Procedure: LARYNGOSCOPY;  Surgeon: Jesse AMOS  MD Erika;  Location: [facility] OR 2ND FLR;  Service: ENT;  Laterality: N/A;    LARYNGOSCOPY N/A 4/27/2022    Procedure: LARYNGOSCOPY WITH BIOPSY;  Surgeon: Jesse James MD;  Location: [facility] OR 2ND FLR;  Service: ENT;  Laterality: N/A;    MICROLARYNGOSCOPY N/A 3/17/2020    Procedure: MICROLARYNGOSCOPY;  Surgeon: Jung Xiao MD;  Location: [facility] OR;  Service: ENT;  Laterality: N/A;  Laser Microlaryngoscopy  [facility] [id number]    REIMPLANTATION OF PARATHYROID TISSUE N/A 1/6/2022    Procedure: REIMPLANTATION, PARATHYROID TISSUE;  Surgeon: Jesse James MD;  Location: [facility] OR [facility] FLR;  Service: ENT;  Laterality: N/A;    THYROIDECTOMY  1/6/2022    Procedure: THYROIDECTOMY;  Surgeon: Jesse James MD;  [facility];  Service: ENT;;    TRACHEOSTOMY N/A 12/27/2021    Procedure: CREATION, TRACHEOSTOMY;  Surgeon: Flex Espinosa MD;  Location: [facility] OR [facility] FLR;  Service: ENT;  Laterality: N/A;     Social History     Socioeconomic History    Marital status:      Spouse name: Janel Batres    Number of children: 2   Occupational History    Occupation: [employer]     Employer: [employer]   Tobacco Use    Smoking status: Never    Smokeless tobacco: Never   Substance and Sexual Activity    Alcohol use: Not Currently     Comment: occasional    Drug use: No    Sexual activity: Yes     Partners: Female   Social History Narrative    2 children from his 1st wife     Family History   Problem Relation Age of Onset    Abnormal EKG Mother     Diabetes Father     Heart disease Father     Hypertension Father      Medication List with Changes/Refills   Current Medications    ACETAMINOPHEN (TYLENOL) 325 MG TABLET    Take 325 mg by mouth every 6 (six) hours as needed for Pain.    DEXAMETHASONE (DECADRON) 4 MG TAB    Take 2 tablets (8 mg total) by mouth once daily. Take as directed on days 2, 3, and 4 of your chemotherapy cycle.    DIPHENHYDRAMINE (BENADRYL) 25 MG  CAPSULE    Take 25 mg by mouth every 6 (six) hours as needed for Itching.    ESOMEPRAZOLE (NEXIUM) 40 MG CAPSULE    40 mg before breakfast.    LEVOFLOXACIN (LEVAQUIN) 750 MG TABLET    1 tablet (750 mg total) by Per G Tube route once daily.    LEVOTHYROXINE (SYNTHROID) 100 MCG TABLET    Take 1 tablet (100 mcg total) by mouth before breakfast.    LIPASE-PROTEASE-AMYLASE 24,000-76,000-120,000 UNITS (CREON) 24,000-76,000 -120,000 UNIT CAPSULE    Take 1 capsule by mouth 3 (three) times daily with meals. for 7 days    LOSARTAN (COZAAR) 100 MG TABLET    TAKE 1 TABLET BY MOUTH EVERY DAY    METFORMIN (GLUCOPHAGE) 500 MG TABLET    Take 1 tablet (500 mg total) by mouth 2 (two) times daily with meals.    METRONIDAZOLE (FLAGYL) 500 MG TABLET    1 tablet (500 mg total) by Per G Tube route 3 (three) times daily.    NUT.TX.GLUC INTOL,LF,SOY-FIBER (GLUCERNA 1.5 TRISHA) 0.08-1.5 GRAM-KCAL/ML LIQD    5 Cans by PEG Tube route 5 (five) times daily.    OLANZAPINE (ZYPREXA) 5 MG TABLET    Take 1 tablet (5 mg total) by mouth every evening. Take as directed on days 1-4 of your chemotherapy cycle.    OPIUM TINCTURE 10 MG/ML (MORPHINE) TINC    Take 1 mL (10 mg total) by mouth 4 (four) times daily as needed.    SCOPOLAMINE (TRANSDERM-SCOP) 1.3-1.5 MG (1 MG OVER 3 DAYS)    Place 1 patch onto the skin every 72 hours.    TRAZODONE (DESYREL) 50 MG TABLET    TAKE 1 TABLET BY MOUTH NIGHTLY AS NEEDED FOR INSOMNIA.    ZOLPIDEM (AMBIEN) 5 MG TAB    1 tablet (5 mg total) by Per G Tube route nightly as needed (difficult with sleep).     Review of patient's allergies indicates:   Allergen Reactions    Pollen extracts     Lovastatin Rash     Not confirmed but pt skeptical       QUALITY OF LIFE: 70%- Cares for Self: Unable to Carry on Normal Activity or Active Work    Vitals:    09/29/22 0827   BP: (!) 87/64   Pulse: 77   Resp: 18   SpO2: 100%   PainSc: 0-No pain     There is no height or weight on file to calculate BMI.    PHYSICAL EXAM:  GENERAL: alert; in  no apparent distress.   HEAD: normocephalic, atraumatic.  EYES: pupils are equal, round, reactive to light and accommodation. Sclera anicteric. Conjunctiva not injected.   NOSE/THROAT: no nasal erythema or rhinorrhea. Oropharynx pink, without erythema, ulcerations or thrush.   NECK: trach w/ vented plug c/d/i; no cervical motion rigidity; supple with no masses.  CHEST: clear to auscultation bilaterally; no wheezes, crackles or rubs. Patient is speaking comfortably on room air with normal work of breathing without using accessory muscles of respiration.  CARDIOVASCULAR: regular rate and rhythm; no murmurs, rubs or gallops.  ABDOMEN: soft, nontender, nondistended. Bowel sounds present.   MUSCULOSKELETAL: no tenderness to palpation along the spine or scapulae. Normal range of motion.  NEUROLOGIC: cranial nerves II-XII intact bilaterally. Strength 5/5 in bilateral upper and lower extremities. No sensory deficits appreciated. Reflexes globally intact. No cerebellar signs. Normal gait.  LYMPHATIC: no cervical, supraclavicular or axillary adenopathy appreciated bilaterally.   EXTREMITIES: no clubbing, cyanosis, edema.      ASSESSMENT: Cyrus Batres Jr. is a 72 y.o. male with recurrent SCC of SGL/BoT    PLAN:   - Presented patient at [facility] [place]&N tumor board today, and Dr. James agrees to see pt as lesion may still be resectable  - Reviewed options of repeat CRT vs surgery w/ patient, and highlighted potentially severe risks of repeat H&N CRT such as mandibular/hyoid necrosis, spinal cord myelopathy, and carotid blowout.  - Pt agreed to meet w/ Dr. James next week in [place] to weigh his options and potential morbidities of such  - Explained all above to pt's wife this afternoon as well, as requested by patient today  - RTC after surgery, or in lieu of such if opted against for CRT planning    All questions answered and contact information provided. Patient understands free to call us anytime with any questions or concerns  regarding radiation therapy.    I have personally seen and evaluated this patient. Greater than 50% of this time was spent discussing coordination of care and/or counseling.    PHYSICIAN: Clay Lucero III, MD

## 2022-09-30 ENCOUNTER — TUMOR BOARD CONFERENCE (OUTPATIENT)
Dept: OTOLARYNGOLOGY | Facility: CLINIC | Age: 72
End: 2022-09-30
Payer: MEDICARE

## 2022-09-30 NOTE — PROGRESS NOTES
Date Presented to Tumor Board: 09/29/22  Specialties Present: Medical Oncology; Radiation Oncology; Pathology; Navigation; Head and Neck; Nutrition; Speech Pathology  Presentation at Cancer Conference: Prospective  Cancer Type: Head and neck cancer  Recommended Plan Note: Follow up in head and neck clinic

## 2022-10-03 ENCOUNTER — PATIENT MESSAGE (OUTPATIENT)
Dept: HEMATOLOGY/ONCOLOGY | Facility: CLINIC | Age: 72
End: 2022-10-03

## 2022-10-04 ENCOUNTER — OFFICE VISIT (OUTPATIENT)
Dept: SURGICAL ONCOLOGY | Facility: CLINIC | Age: 72
End: 2022-10-04
Payer: MEDICARE

## 2022-10-04 ENCOUNTER — TELEPHONE (OUTPATIENT)
Dept: HEMATOLOGY/ONCOLOGY | Facility: CLINIC | Age: 72
End: 2022-10-04

## 2022-10-04 VITALS — HEIGHT: 66 IN | WEIGHT: 136.88 LBS | BODY MASS INDEX: 22 KG/M2

## 2022-10-04 DIAGNOSIS — C32.9 LARYNX CANCER: ICD-10-CM

## 2022-10-04 DIAGNOSIS — C01 PRIMARY CANCER OF BASE OF TONGUE: Primary | ICD-10-CM

## 2022-10-04 PROCEDURE — 3060F PR POS MICROALBUMINURIA RESULT DOCUMENTED/REVIEW: ICD-10-PCS | Mod: CPTII,S$GLB,, | Performed by: OTOLARYNGOLOGY

## 2022-10-04 PROCEDURE — 99499 UNLISTED E&M SERVICE: CPT | Mod: S$GLB,,, | Performed by: OTOLARYNGOLOGY

## 2022-10-04 PROCEDURE — 99999 PR PBB SHADOW E&M-EST. PATIENT-LVL III: CPT | Mod: PBBFAC,,, | Performed by: OTOLARYNGOLOGY

## 2022-10-04 PROCEDURE — 1160F PR REVIEW ALL MEDS BY PRESCRIBER/CLIN PHARMACIST DOCUMENTED: ICD-10-PCS | Mod: CPTII,S$GLB,, | Performed by: OTOLARYNGOLOGY

## 2022-10-04 PROCEDURE — 3066F NEPHROPATHY DOC TX: CPT | Mod: CPTII,S$GLB,, | Performed by: OTOLARYNGOLOGY

## 2022-10-04 PROCEDURE — 3008F PR BODY MASS INDEX (BMI) DOCUMENTED: ICD-10-PCS | Mod: CPTII,S$GLB,, | Performed by: OTOLARYNGOLOGY

## 2022-10-04 PROCEDURE — 99999 PR PBB SHADOW E&M-EST. PATIENT-LVL III: ICD-10-PCS | Mod: PBBFAC,,, | Performed by: OTOLARYNGOLOGY

## 2022-10-04 PROCEDURE — 1125F AMNT PAIN NOTED PAIN PRSNT: CPT | Mod: CPTII,S$GLB,, | Performed by: OTOLARYNGOLOGY

## 2022-10-04 PROCEDURE — 1159F MED LIST DOCD IN RCRD: CPT | Mod: CPTII,S$GLB,, | Performed by: OTOLARYNGOLOGY

## 2022-10-04 PROCEDURE — 3044F PR MOST RECENT HEMOGLOBIN A1C LEVEL <7.0%: ICD-10-PCS | Mod: CPTII,S$GLB,, | Performed by: OTOLARYNGOLOGY

## 2022-10-04 PROCEDURE — 1111F DSCHRG MED/CURRENT MED MERGE: CPT | Mod: CPTII,S$GLB,, | Performed by: OTOLARYNGOLOGY

## 2022-10-04 PROCEDURE — 3066F PR DOCUMENTATION OF TREATMENT FOR NEPHROPATHY: ICD-10-PCS | Mod: CPTII,S$GLB,, | Performed by: OTOLARYNGOLOGY

## 2022-10-04 PROCEDURE — 3060F POS MICROALBUMINURIA REV: CPT | Mod: CPTII,S$GLB,, | Performed by: OTOLARYNGOLOGY

## 2022-10-04 PROCEDURE — 1111F PR DISCHARGE MEDS RECONCILED W/ CURRENT OUTPATIENT MED LIST: ICD-10-PCS | Mod: CPTII,S$GLB,, | Performed by: OTOLARYNGOLOGY

## 2022-10-04 PROCEDURE — 1101F PT FALLS ASSESS-DOCD LE1/YR: CPT | Mod: CPTII,S$GLB,, | Performed by: OTOLARYNGOLOGY

## 2022-10-04 PROCEDURE — 1125F PR PAIN SEVERITY QUANTIFIED, PAIN PRESENT: ICD-10-PCS | Mod: CPTII,S$GLB,, | Performed by: OTOLARYNGOLOGY

## 2022-10-04 PROCEDURE — 99213 PR OFFICE/OUTPT VISIT, EST, LEVL III, 20-29 MIN: ICD-10-PCS | Mod: S$GLB,,, | Performed by: OTOLARYNGOLOGY

## 2022-10-04 PROCEDURE — 1101F PR PT FALLS ASSESS DOC 0-1 FALLS W/OUT INJ PAST YR: ICD-10-PCS | Mod: CPTII,S$GLB,, | Performed by: OTOLARYNGOLOGY

## 2022-10-04 PROCEDURE — 3044F HG A1C LEVEL LT 7.0%: CPT | Mod: CPTII,S$GLB,, | Performed by: OTOLARYNGOLOGY

## 2022-10-04 PROCEDURE — 3008F BODY MASS INDEX DOCD: CPT | Mod: CPTII,S$GLB,, | Performed by: OTOLARYNGOLOGY

## 2022-10-04 PROCEDURE — 3288F FALL RISK ASSESSMENT DOCD: CPT | Mod: CPTII,S$GLB,, | Performed by: OTOLARYNGOLOGY

## 2022-10-04 PROCEDURE — 4010F PR ACE/ARB THEARPY RXD/TAKEN: ICD-10-PCS | Mod: CPTII,S$GLB,, | Performed by: OTOLARYNGOLOGY

## 2022-10-04 PROCEDURE — 1160F RVW MEDS BY RX/DR IN RCRD: CPT | Mod: CPTII,S$GLB,, | Performed by: OTOLARYNGOLOGY

## 2022-10-04 PROCEDURE — 1159F PR MEDICATION LIST DOCUMENTED IN MEDICAL RECORD: ICD-10-PCS | Mod: CPTII,S$GLB,, | Performed by: OTOLARYNGOLOGY

## 2022-10-04 PROCEDURE — 4010F ACE/ARB THERAPY RXD/TAKEN: CPT | Mod: CPTII,S$GLB,, | Performed by: OTOLARYNGOLOGY

## 2022-10-04 PROCEDURE — 3288F PR FALLS RISK ASSESSMENT DOCUMENTED: ICD-10-PCS | Mod: CPTII,S$GLB,, | Performed by: OTOLARYNGOLOGY

## 2022-10-04 PROCEDURE — 99213 OFFICE O/P EST LOW 20 MIN: CPT | Mod: S$GLB,,, | Performed by: OTOLARYNGOLOGY

## 2022-10-04 NOTE — ASSESSMENT & PLAN NOTE
Progressing despite chemotherapy and immunotherapy.  I reviewed his CT angiogram of the neck and do feel that his tumor is resectable though it would require a massive operation to resect the tumor.  Resection would entail near total pharyngectomy and total glossectomy along with resection of the neck skin just superior to the free flap.  Reconstruction would be with a latissimus dorsi free flap though I am concerned that he may require 2 flaps given the anticipated pharyngeal defect.  Reconstruction would be significantly complicated by his treatment history and the paucity of recipient vessels in the neck.  He understands the magnitude of this operation.  I explained to him, though, that given his otherwise negative PET scan, surgery is the only curative option available.  He understands that he would essentially remained nonverbal and would likely not be able to ever be rid of his PEG tube after surgery.  He will consider this option and contact me with questions or if he wishes to schedule.  Should he desire surgical resection, I would plan a diagnostic endoscopy before committing to surgical resection.

## 2022-10-04 NOTE — PROGRESS NOTES
Chief Complaint   Patient presents with    Follow-up     Tumor board follow-up     Oncology History   Larynx cancer   8/4/2020 Initial Diagnosis    Larynx neoplasm malignant     8/6/2020 Tumor Conference    His case was discussed at the Multidisciplinary Head and Neck Team Planning Meeting.    Representatives from Medical Oncology, Radiation Oncology, Head and Neck Surgical Oncology, Psychosocial Oncology, and Speech and Language Pathology discussed the case with the following recommendations:    1) definitive radiation              8/6/2020 Cancer Staged    Staging form: Larynx - Glottis, AJCC 8th Edition  - Clinical stage from 8/6/2020: Stage II (cT2, cN0, cM0)       8/6/2020 Cancer Staged    Cancer Staging  Larynx neoplasm malignant  Staging form: Larynx - Glottis, AJCC 8th Edition  - Clinical stage from 8/6/2020: Stage II (cT2, cN0, cM0) - Signed by Fariha Muñoz NP on 8/6/2020 12/16/2021 Tumor Conference    His case was discussed at the Multidisciplinary Head and Neck Team Planning Meeting.    Representatives from Medical Oncology, Radiation Oncology, Head and Neck Surgical Oncology, Psychosocial Oncology, and Speech and Language Pathology discussed the case with the following recommendations:    1) TL  2) oncology referral  3) psychology referral            12/27/2021 Surgery    1. DL And tracheostomy  2. PEG     1/6/2022 Surgery    1. Diagnostic direct laryngoscopy  2. Bilateral modified neck dissection of levels 2 through 4  3. Total laryngectomy  4. Total thyroidectomy with bilateral paratracheal/upper mediastinal neck dissection  5. Autotransplantation of left inferior parathyroid into left sternocleidomastoid muscle   6. Left anterolateral thigh free flap for closure of total laryngectomy neck skin defect  7. Advancement flap for closure of left thigh donor site advanced area was 25 x 10 cm     1/20/2022 Cancer Staged    Staging form: Larynx - Glottis, AJCC 8th Edition  - Pathologic stage from  1/20/2022: Stage LOU (pT4a, pN0, cM0)       5/30/2022 Cancer Staged    Staging form: Pharynx - P16 Negative Oropharynx, AJCC 8th Edition  - Clinical: Stage II (rcT2, cN0, cM0)       Primary cancer of base of tongue   5/20/2022 Cancer Staged    Staging form: Pharynx - P16 Negative Oropharynx, AJCC 8th Edition  - Clinical stage from 5/20/2022: Stage II (cT2, cN0, cM0, p16-)       6/22/2022 Initial Diagnosis    Primary cancer of base of tongue     7/11/2022 -  Chemotherapy    Treatment Summary   Plan Name: OP HEAD NECK PEMBROLIZUMAB CARBOPLATIN FLUOROURACIL Q3W FOLLOWED BY MAINTENANCE PEMBROLIZUMAB 400 MG Q6W  Treatment Goal: Palliative  Status: Active  Start Date: 7/11/2022  End Date: 6/29/2024 (Planned)  Provider: Aurash Khoobehi, MD  Chemotherapy: dexAMETHasone (DECADRON) 4 MG Tab, 8 mg, Oral, Daily, 1 of 1 cycle, Start date: 6/23/2022, End date: --  OLANZapine (ZYPREXA) 5 MG tablet, 5 mg, Oral, Nightly, 1 of 1 cycle, Start date: 6/22/2022, End date: --  CARBOplatin (PARAPLATIN) 440 mg in sodium chloride 0.9% 544 mL chemo infusion, 440 mg (100 % of original dose 438.5 mg), Intravenous, Clinic/HOD 1 time, 3 of 6 cycles  Dose modification:   (original dose 438.5 mg, Cycle 1)  Administration: 440 mg (7/11/2022), 435 mg (8/1/2022), 510 mg (8/22/2022)  pembrolizumab (KEYTRUDA) 200 mg in sodium chloride 0.9% 108 mL infusion, 200 mg, Intravenous, Clinic/HOD 1 time, 3 of 21 cycles  Administration: 200 mg (7/11/2022), 200 mg (8/1/2022), 200 mg (8/22/2022)             HPI   72 y.o. male presents with the above treatment history.  He has been treated with chemotherapy and immunotherapy.  Unfortunately, his tumor has progressed.  He is entirely dependent on his feeding tube.  He is spitting out his secretions as he is having difficulty clearing them.  He presents today for evaluation for surgical resection.    Review of Systems   Constitutional: Negative for fatigue and unexpected weight change.   HENT: Per HPI.  Eyes: Negative  for visual disturbance.   Respiratory: Negative for shortness of breath, hemoptysis   Cardiovascular: Negative for chest pain and palpitations.   Musculoskeletal: Negative for decreased ROM, back pain.   Skin: Negative for rash, sunburn, itching.   Neurological: Negative for dizziness and seizures.   Hematological: Negative for adenopathy. Does not bruise/bleed easily.   Endocrine: Negative for rapid weight loss/weight gain, heat/cold intolerance.     Past Medical History   Patient Active Problem List   Diagnosis    Melanocytic nevus    Body mass index (BMI) of 29.0-29.9 in adult    Benign hypertension    Overweight    Paroxysmal atrial fibrillation    Type 2 diabetes mellitus with diabetic arthropathy, with long-term current use of insulin    Fatty liver disease, nonalcoholic    False positive serological test for hepatitis C    Hyperlipidemia    Type 2 diabetes mellitus with hyperglycemia, without long-term current use of insulin    Gastroesophageal reflux disease without esophagitis    Type 2 diabetes mellitus with hyperglycemia    Vitamin B12 deficiency    Hyperuricemia    Hypovitaminosis D    Proteinuria    On enteral nutrition    Larynx cancer    Dehydration    NSVT (nonsustained ventricular tachycardia)    Hemoptysis    Adverse effect of adrenal cortical steroids, sequela    S/P laryngectomy    Hypocalcemia    Aphonia    Dysphagia, pharyngoesophageal    Acute pain of both shoulders    Bilateral arm weakness    Oral bleeding    Primary cancer of base of tongue    Advanced care planning/counseling discussion    Iron deficiency anemia due to chronic blood loss    Postoperative hypothyroidism    Pressure injury of sacral region, stage 1    Pneumatosis intestinalis    Nausea and vomiting    Neutropenia    Abnormal CT scan, neck           Past Surgical History   Past Surgical History:   Procedure Laterality Date    DIRECT LARYNGOBRONCHOSCOPY N/A 12/27/2021    Procedure: LARYNGOSCOPY, DIRECT, WITH BRONCHOSCOPY;   Surgeon: Flex Espinosa MD;  [facility];  Service: ENT;  Laterality: N/A;    DISSECTION OF NECK Bilateral 1/6/2022    Procedure: DISSECTION, NECK;  Surgeon: Jesse James MD;  [facility];  Service: ENT;  Laterality: Bilateral;    FLAP PROCEDURE Right 1/6/2022    Procedure: CREATION, FREE FLAP;  Surgeon: Alise Hart MD;  [facility];  Service: ENT;  Laterality: Right;  Ischemic start 1351  Ischemic stop 1502    INSERTION OF TUNNELED CENTRAL VENOUS CATHETER (CVC) WITH SUBCUTANEOUS PORT N/A 6/9/2022    Procedure: [id number];  Surgeon: Jesus Viera MD;  Location: [facility];  Service: General;  Laterality: N/A;    LARYNGECTOMY N/A 1/6/2022    Procedure: LARYNGECTOMY;  Surgeon: Jesse James MD;  [facility];  Service: ENT;  Laterality: N/A;    LARYNGOSCOPY N/A 8/4/2020    Procedure: Suspension microlaryngoscopy with biopsy, possible KTP laser treatment/excision;  Surgeon: Stew Noel MD;  [facility];  Service: ENT;  Laterality: N/A;  Microscope, telescopes, tower, microinstruments, KTP laser, rep conf# [id number] IC 7/28.    LARYNGOSCOPY N/A 3/16/2021    Procedure: Suspension microlaryngoscopy with excision of lesion, possible CO2 laser;  Surgeon: Stew Noel MD;  [facility];  Service: ENT;  Laterality: N/A;  Microscope, telescopes, tower, microinstruments, CO2 laser, rep conf# [id number] IC 3/4.    LARYNGOSCOPY N/A 4/1/2021    Procedure: Suspension microlaryngoscopy with KTP laser excision of lesion;  Surgeon: Stew Noel MD;  [facility];  Service: ENT;  Laterality: N/A;  Microscope, telescopes, tower, microinstruments, 70 degree scope, vocal fold , KTP laser, rep conf# [id number] BC    LARYNGOSCOPY N/A 12/9/2021    Procedure: Suspension microlaryngoscopy with biopsy;  Surgeon: Stew Noel MD;  Location: [facility] OR [address] [place] [address];  Service: ENT;  Laterality: N/A;   Microscope, telescopes, tower, microinstruments    LARYNGOSCOPY N/A 1/6/2022    Procedure: LARYNGOSCOPY;  Surgeon: Jesse James MD;  [facility]: [facility];  Service: ENT;  Laterality: N/A;    LARYNGOSCOPY N/A 4/27/2022    Procedure: LARYNGOSCOPY WITH BIOPSY;  Surgeon: Jesse James MD;  Location: [facility];  Service: ENT;  Laterality: N/A;    MICROLARYNGOSCOPY N/A 3/17/2020    Procedure: MICROLARYNGOSCOPY;  Surgeon: Jung Xiao MD;  Location: [facility] OR;  Service: ENT;  Laterality: N/A;  Laser Microlaryngoscopy  NEED TO SCHEDULE LASER from [employer] [id number]    REIMPLANTATION OF PARATHYROID TISSUE N/A 1/6/2022    Procedure: REIMPLANTATION, PARATHYROID TISSUE;  Surgeon: Jesse James MD;  Location: [facility];  Service: ENT;  Laterality: N/A;    THYROIDECTOMY  1/6/2022    Procedure: THYROIDECTOMY;  Surgeon: Jesse James MD;  [facility];  Service: ENT;;    TRACHEOSTOMY N/A 12/27/2021    Procedure: CREATION, TRACHEOSTOMY;  Surgeon: Flex Espinosa MD;  Location: [facility];  Service: ENT;  Laterality: N/A;         Family History   Family History   Problem Relation Age of Onset    Abnormal EKG Mother     Diabetes Father     Heart disease Father     Hypertension Father            Social History   .  Social History     Socioeconomic History    Marital status:      Spouse name: Janel Batres    Number of children: 2   Occupational History    Occupation: [employer]     Employer: [employer]   Tobacco Use    Smoking status: Never    Smokeless tobacco: Never   Substance and Sexual Activity    Alcohol use: Not Currently     Comment: occasional    Drug use: No    Sexual activity: Yes     Partners: Female   Social History Narrative    2 children from his 1st wife         Allergies   Review of patient's allergies indicates:   Allergen Reactions    Pollen extracts     Lovastatin Rash     Not confirmed but pt skeptical           Physical Exam     There  were no vitals filed for this visit.        Body mass index is 22.1 kg/m².      General: AOx3, NAD   Respiratory:  Symmetric chest rise, normal effort  Oral Cavity:  Oral Tongue mobile, no lesions noted. Hard Palate WNL. No buccal or FOM lesions.  Oropharynx:  No masses/lesions of the posterior pharyngeal wall. Tonsillar fossa without lesions. Soft palate without masses. Midline uvula.   Neck: Stoma widely patent. Skin paddle healthy with good color and turgor.Well-healed neck incisions.No LAD. Moderate post-op, post-treatment fibrosis. Submental neck diffusely full.  Face: House Brackmann I bilaterally.    Flex Naso Cheli Hypo Procedures #2    Procedure:  Diagnostic flexible nasopharyngoscopy, laryngoscopy and hypopharyngoscopy:    Routine preparation with local atomizer with 1% neosynephrine/pontocaine with customary flexible endoscope.    Nasopharynx:  No lesions.   Mucosa:  No lesions.   Adenoids:  Present.  Posterior Choanae:  Patent.  Eustachian Tubes:  Patent.  Neopharynx:  Difficult to assess tumor due to pooled secretions in the pharynx.    CTA reviewed.      Assessment/Plan  Problem List Items Addressed This Visit          Oncology    Larynx cancer    Primary cancer of base of tongue - Primary     Progressing despite chemotherapy and immunotherapy.  I reviewed his CT angiogram of the neck and do feel that his tumor is resectable though it would require a massive operation to resect the tumor.  Resection would entail near total pharyngectomy and total glossectomy along with resection of the neck skin just superior to the free flap.  Reconstruction would be with a latissimus dorsi free flap though I am concerned that he may require 2 flaps given the anticipated pharyngeal defect.  Reconstruction would be significantly complicated by his treatment history and the paucity of recipient vessels in the neck.  He understands the magnitude of this operation.  I explained to him, though, that given his otherwise  negative PET scan, surgery is the only curative option available.  He understands that he would essentially remained nonverbal and would likely not be able to ever be rid of his PEG tube after surgery.  He will consider this option and contact me with questions or if he wishes to schedule.  Should he desire surgical resection, I would plan a diagnostic endoscopy before committing to surgical resection.

## 2022-10-05 ENCOUNTER — LAB VISIT (OUTPATIENT)
Dept: LAB | Facility: HOSPITAL | Age: 72
End: 2022-10-05
Attending: INTERNAL MEDICINE
Payer: MEDICARE

## 2022-10-05 DIAGNOSIS — C01 PRIMARY CANCER OF BASE OF TONGUE: ICD-10-CM

## 2022-10-05 DIAGNOSIS — C32.9 LARYNX CANCER: ICD-10-CM

## 2022-10-05 DIAGNOSIS — R93.89 ABNORMAL CT SCAN, NECK: ICD-10-CM

## 2022-10-05 LAB
ALBUMIN SERPL BCP-MCNC: 3.9 G/DL (ref 3.5–5.2)
ALP SERPL-CCNC: 82 U/L (ref 55–135)
ALT SERPL W/O P-5'-P-CCNC: 16 U/L (ref 10–44)
ANION GAP SERPL CALC-SCNC: 7 MMOL/L (ref 8–16)
AST SERPL-CCNC: 20 U/L (ref 10–40)
BASOPHILS # BLD AUTO: 0.01 K/UL (ref 0–0.2)
BASOPHILS NFR BLD: 0.1 % (ref 0–1.9)
BILIRUB SERPL-MCNC: 0.6 MG/DL (ref 0.1–1)
BUN SERPL-MCNC: 19 MG/DL (ref 8–23)
CALCIUM SERPL-MCNC: 9.4 MG/DL (ref 8.7–10.5)
CHLORIDE SERPL-SCNC: 104 MMOL/L (ref 95–110)
CO2 SERPL-SCNC: 26 MMOL/L (ref 23–29)
CREAT SERPL-MCNC: 0.8 MG/DL (ref 0.5–1.4)
DIFFERENTIAL METHOD: ABNORMAL
EOSINOPHIL # BLD AUTO: 0 K/UL (ref 0–0.5)
EOSINOPHIL NFR BLD: 0.6 % (ref 0–8)
ERYTHROCYTE [DISTWIDTH] IN BLOOD BY AUTOMATED COUNT: 21.7 % (ref 11.5–14.5)
EST. GFR  (NO RACE VARIABLE): >60 ML/MIN/1.73 M^2
GLUCOSE SERPL-MCNC: 271 MG/DL (ref 70–110)
HCT VFR BLD AUTO: 32.9 % (ref 40–54)
HGB BLD-MCNC: 10.6 G/DL (ref 14–18)
IMM GRANULOCYTES # BLD AUTO: 0.07 K/UL (ref 0–0.04)
IMM GRANULOCYTES NFR BLD AUTO: 1 % (ref 0–0.5)
LYMPHOCYTES # BLD AUTO: 0.9 K/UL (ref 1–4.8)
LYMPHOCYTES NFR BLD: 13.1 % (ref 18–48)
MCH RBC QN AUTO: 31.4 PG (ref 27–31)
MCHC RBC AUTO-ENTMCNC: 32.2 G/DL (ref 32–36)
MCV RBC AUTO: 97 FL (ref 82–98)
MONOCYTES # BLD AUTO: 0.3 K/UL (ref 0.3–1)
MONOCYTES NFR BLD: 4.6 % (ref 4–15)
NEUTROPHILS # BLD AUTO: 5.8 K/UL (ref 1.8–7.7)
NEUTROPHILS NFR BLD: 80.6 % (ref 38–73)
NRBC BLD-RTO: 0 /100 WBC
PLATELET # BLD AUTO: 211 K/UL (ref 150–450)
PMV BLD AUTO: 10 FL (ref 9.2–12.9)
POTASSIUM SERPL-SCNC: 4 MMOL/L (ref 3.5–5.1)
PROT SERPL-MCNC: 7.1 G/DL (ref 6–8.4)
RBC # BLD AUTO: 3.38 M/UL (ref 4.6–6.2)
SODIUM SERPL-SCNC: 137 MMOL/L (ref 136–145)
TSH SERPL DL<=0.005 MIU/L-ACNC: 1.02 UIU/ML (ref 0.34–5.6)
WBC # BLD AUTO: 7.15 K/UL (ref 3.9–12.7)

## 2022-10-05 PROCEDURE — 84443 ASSAY THYROID STIM HORMONE: CPT | Performed by: INTERNAL MEDICINE

## 2022-10-05 PROCEDURE — 36415 COLL VENOUS BLD VENIPUNCTURE: CPT | Performed by: INTERNAL MEDICINE

## 2022-10-05 PROCEDURE — 80053 COMPREHEN METABOLIC PANEL: CPT | Performed by: INTERNAL MEDICINE

## 2022-10-05 PROCEDURE — 85025 COMPLETE CBC W/AUTO DIFF WBC: CPT | Performed by: INTERNAL MEDICINE

## 2022-10-05 NOTE — PROGRESS NOTES
[facility] Hematology/Oncology  PROGRESS NOTE -  Follow-up Visit      Subjective:       Patient ID:   NAME: Cyrus Batres Jr. : 1950     72 y.o. 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  The patient is here today to go over the results of the recently ordered labs, tests and studies. He is here with his wife today.       He saw Dr Lucero with Rad/onc, and Dr James and his case was presented to H&[facility] Tumor Board at [facility] and  he general consensus was to proceed to surgery.    He has been doing suctioning and coughing; some mild intermittent diarrhea.     He is breathing ok. No HA's or CP; no pain at this time        He has portacath on left CW        Discussed covid19 and he has been vaccinated      ROS:   GEN: normal without any fever, night sweats or weight loss; he has gained some weight  HEENT: normal with no HA's, sore throat, stiff neck, changes in vision  CV: normal with no CP, SOB, PND, GRIFFIN or orthopnea  PULM: normal with no SOB, cough, hemoptysis, sputum or pleuritic pain  GI: chronic N/V with the chemotherapy; has peg tube; reflux  : normal with no hematuria, dysuria  BREAST: normal with no mass, discharge, pain  SKIN: normal with no rash, erythema, bruising, or swelling     Pain Scale:  0    Allergies:  Review of patient's allergies indicates:   Allergen Reactions    Pollen extracts     Lovastatin Rash     Not confirmed but pt skeptical       Medications:    Current Outpatient Medications:     acetaminophen (TYLENOL) 325 MG tablet, Take 325 mg by mouth every 6 (six) hours as needed for Pain., Disp: , Rfl:     dexAMETHasone (DECADRON) 4 MG Tab, Take 2 tablets (8 mg total) by mouth once daily. Take as directed on days 2, 3, and 4 of your  chemotherapy cycle. (Patient not taking: No sig reported), Disp: 24 tablet, Rfl: 2    diphenhydrAMINE (BENADRYL) 25 mg capsule, Take 25 mg by mouth every 6 (six) hours as needed for Itching., Disp: , Rfl:     esomeprazole (NEXIUM) 40 MG capsule, 40 mg before breakfast., Disp: , Rfl:     levoFLOXacin (LEVAQUIN) 750 MG tablet, 1 tablet (750 mg total) by Per G Tube route once daily. (Patient not taking: No sig reported), Disp: 7 tablet, Rfl: 0    levothyroxine (SYNTHROID) 100 MCG tablet, Take 1 tablet (100 mcg total) by mouth before breakfast., Disp: 30 tablet, Rfl: 11    lipase-protease-amylase 24,000-76,000-120,000 units (CREON) 24,000-76,000 -120,000 unit capsule, Take 1 capsule by mouth 3 (three) times daily with meals. for 7 days, Disp: 21 capsule, Rfl: 0    losartan (COZAAR) 100 MG tablet, TAKE 1 TABLET BY MOUTH EVERY DAY (Patient taking differently: Take 100 mg by mouth every evening.), Disp: 90 tablet, Rfl: 3    metFORMIN (GLUCOPHAGE) 500 MG tablet, Take 1 tablet (500 mg total) by mouth 2 (two) times daily with meals., Disp: 60 tablet, Rfl: 3    metroNIDAZOLE (FLAGYL) 500 MG tablet, 1 tablet (500 mg total) by Per G Tube route 3 (three) times daily. (Patient not taking: No sig reported), Disp: 21 tablet, Rfl: 0    nut.tx.gluc intol,lf,soy-fiber (GLUCERNA 1.5 TRISHA) 0.08-1.5 gram-kcal/mL Liqd, 5 Cans by PEG Tube route 5 (five) times daily. (Patient not taking: No sig reported), Disp: 150 each, Rfl: 5    OLANZapine (ZYPREXA) 5 MG tablet, Take 1 tablet (5 mg total) by mouth every evening. Take as directed on days 1-4 of your chemotherapy cycle., Disp: 30 tablet, Rfl: 5    opium tincture 10 mg/mL (morphine) Tinc, Take 1 mL (10 mg total) by mouth 4 (four) times daily as needed., Disp: 118 mL, Rfl: 0    scopolamine (TRANSDERM-SCOP) 1.3-1.5 mg (1 mg over 3 days), Place 1 patch onto the skin every 72 hours., Disp: 10 patch, Rfl: 0    traZODone (DESYREL) 50 MG tablet, TAKE 1 TABLET BY MOUTH NIGHTLY AS NEEDED FOR INSOMNIA.,  Disp: 90 tablet, Rfl: 1    zolpidem (AMBIEN) 5 MG Tab, 1 tablet (5 mg total) by Per G Tube route nightly as needed (difficult with sleep)., Disp: 30 tablet, Rfl: 0    PMHx/PSHx Updates:  See patient's last visit with me on 9/29/2022.  See H&P on 9/23/2022        Pathology:   Cancer Staging   Larynx cancer  Staging form: Larynx - Glottis, AJCC 8th Edition  - Clinical stage from 8/6/2020: Stage II (cT2, cN0, cM0) - Signed by Fariha Muñoz NP on 8/6/2020  - Pathologic stage from 1/20/2022: Stage LOU (pT4a, pN0, cM0) - Signed by Fariha Muñoz NP on 1/20/2022  Staging form: Pharynx - P16 Negative Oropharynx, AJCC 8th Edition  - Clinical: Stage II (rcT2, cN0, cM0) - Signed by Matheus Portillo Jr., MD on 5/30/2022    Primary cancer of base of tongue  Staging form: Pharynx - P16 Negative Oropharynx, AJCC 8th Edition  - Clinical stage from 5/20/2022: Stage II (cT2, cN0, cM0, p16-) - Signed by Saúl Kessler MD on 7/7/2022      Base of Tongue Biopsy  4/27/2022:  Final Pathologic Diagnosis BIOPSY OF BASE OF THE TONGUE:   THE INFILTRATING POORLY DIFFERENTIATED SQUAMOUS CELL CARCINOMA   THE TUMOR IS P16 NEGATIVE.  THE POSITIVE AND NEGATIVE CONTROLS STAINED   APPROPRIATELY      Larynx resection/thyroidectomy 1/6/2022:     Final Pathologic Diagnosis 1. Lymph nodes, left neck levels 2,  3 and 4, dissection:   - Nineteen lymph nodes, all  negative  for metastatic carcinoma (0/19)   2. Lymph nodes, right neck levels 2,  3 and 4, dissection:   - Twenty-eight lymph nodes, all  negative  for metastatic carcinoma (0/28)   3. Possible parathyroid gland, excision:   - Benign parathyroid gland tissue (0.003 g)   4. 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 Soft tissue, posterior tracheal margin, excision:   - Benign, focally reactive respiratory mucosa with submucosal acute   inflammation,  negative  for dysplasia or malignancy   6. Soft tissue, anterior tracheal margin, excision:   - Benign respiratory mucosa with subepithelial cartilage,  negative  for   dysplasia or malignancy   7. Soft tissue, posterior tracheal margin #2, excision:   - Benign, focally reactive respiratory mucosa with submucosal acute   inflammation,  negative  for dysplasia or malignancy   8. Soft tissue, anterior tracheal margin #2, excision:   - Benign, reactive focally ulcerated respiratory mucosa with submucosal acute   inflammation,  negative  for dysplasia or malignancy             Laryngoscope 8/4/2020: (with Dr Noel):  Final Pathologic Diagnosis 1.  Left true vocal fold, biopsy:       -  Invasive moderately differentiated squamous cell carcinoma,   keratinizing type   2.  Left true vocal fold, biopsy:       -  Invasive moderately differentiated squamous cell carcinoma,   keratinizing type             Laryngoscope 3/17/2020 (with Dr Xiao):  Final Pathologic Diagnosis 1.  BIOPSY OF LEFT TRUE VOCAL CORD:   SEVERELY DYSPLASTIC APPEARING SQUAMOUS MUCOSA   INVASIVE CARCINOMA IS NOT DOCUMENTED   ON THE OTHER HAND, THESE FRAGMENTS ARE NODUL AND WITHOUT SUBMUCOSA FOR   EVALUATION; IT IS POSSIBLE THAT THEY REFLECT INVASIVE SQUAMOUS CARCINOMA   2.  BIOPSY OF LEFT ANTERIOR COMMISSURE:   MODERATE DYSPLASIA   NO INVASIVE CARCINOMA IDENTIFIED             Objective:     Vitals:  Blood pressure 117/70, pulse 92, temperature 98.6 °F (37 °C), weight 62.1 kg (137 lb).    Physical Examination:   GEN: no apparent distress, comfortable; AAOx3  HEAD: atraumatic and normocephalic  EYES: no pallor, no icterus, PERRLA  ENT:  OMM, no pharyngeal erythema, external ears WNL; no nasal discharge; no thrush; s/p laryngectomy with flap  NECK: no masses, thyroid normal, trachea midline, no LAD/LN's, supple  CV: RRR with no murmur; normal pulse; normal S1 and S2; no pedal edema; portacath left CW  CHEST: Normal respiratory effort; CTAB; normal breath sounds; no wheeze or crackles  ABDOM: nontender and nondistended; soft; normal bowel sounds; no rebound/guarding; peg tube  MUSC/Skeletal: ROM normal; no crepitus; joints normal; no deformities or arthropathy  EXTREM: no clubbing, cyanosis, inflammation or swelling  SKIN: no rashes, lesions, ulcers, petechiae or subcutaneous nodules  : no mahan  NEURO: grossly intact; motor/sensory WNL; AAOx3; no tremors  PSYCH: normal mood, affect and behavior  LYMPH: normal cervical, supraclavicular, axillary and groin LN's          Labs:     Lab Results   Component Value Date    WBC 7.15 10/05/2022    HGB 10.6 (L) 10/05/2022    HCT 32.9 (L) 10/05/2022    MCV 97 10/05/2022     10/05/2022     CMP  Sodium   Date Value Ref Range Status   10/05/2022 137 136 - 145 mmol/L Final     Potassium   Date Value Ref Range Status   10/05/2022 4.0 3.5 - 5.1 mmol/L Final     Chloride   Date Value Ref Range Status   10/05/2022 104 95 - 110 mmol/L Final     CO2   Date Value Ref Range Status   10/05/2022 26 23 - 29 mmol/L Final     Glucose   Date Value Ref Range Status   10/05/2022 271 (H) 70 - 110 mg/dL Final     BUN   Date Value Ref Range Status   10/05/2022 19 8 - 23 mg/dL Final     Creatinine   Date Value Ref Range Status   10/05/2022 0.8 0.5 - 1.4 mg/dL Final     Calcium   Date Value Ref Range Status   10/05/2022 9.4 8.7 - 10.5 mg/dL Final     Total Protein   Date Value Ref Range Status   10/05/2022 7.1 6.0 - 8.4 g/dL Final     Albumin   Date Value Ref Range Status   10/05/2022 3.9 3.5 - 5.2 g/dL Final   11/18/2020 3.7 3.6 - 5.1 g/dL Final     Comment:     For additional information, please refer to    http://education.[facility]/faq/WIH660 (This link is   being provided for informational/ educational purposes only.)  This test was developed and its analytical performance   characteristics have been determined by [facility]. It has not been cleared or approved by the   [place] Food and Drug Administration. This assay has been validated   pursuant to the CLIA regulations and is used for clinical   purposes.  @ Test Performed By:  [facility]  Yo Cortes M.D.,   [address] [place] [id number]  [facility]  [id number]       Total Bilirubin   Date Value Ref Range Status   10/05/2022 0.6 0.1 - 1.0 mg/dL Final     Comment:     For infants and newborns, interpretation of results should be based  on gestational age, weight and in agreement with clinical  observations.    Premature Infant recommended reference ranges:  Up to 24 hours.............<8.0 mg/dL  Up to 48 hours............<12.0 mg/dL  3-5 days..................<15.0 mg/dL  6-29 days.................<15.0 mg/dL       Alkaline Phosphatase   Date Value Ref Range Status   10/05/2022 82 55 - 135 U/L Final     AST   Date Value Ref Range Status   10/05/2022 20 10 - 40 U/L Final     ALT   Date Value Ref Range Status   10/05/2022 16 10 - 44 U/L Final     Anion Gap   Date Value Ref Range Status   10/05/2022 7 (L) 8 - 16 mmol/L Final     eGFR if    Date Value Ref Range Status   07/29/2022 >60.0 >60 mL/min/1.73 m^2 Final     eGFR if non    Date Value Ref Range Status   07/29/2022 >60.0 >60 mL/min/1.73 m^2 Final     Comment:     Calculation used to obtain the estimated glomerular filtration  rate (eGFR) is the CKD-EPI equation.          Lab Results   Component Value Date    IRON 58 09/04/2022    TRANSFERRIN 159 (L) 09/04/2022    TIBC 223 (L) 09/04/2022    FESATURATED 26 09/04/2022            Radiology/Diagnostic Studies:      CTA Neck    Result  Date: 9/20/2022  EXAMINATION: CTA NECK CLINICAL HISTORY: Head/neck cancer, monitor;Malignant neoplasm of base of tongue TECHNIQUE: CT angiogram was performed from the level of the scott to the EAC following the IV administration of 75mL of Omnipaque 350.   Sagittal and coronal reconstructions and maximum intensity projection reconstructions were performed. Arterial stenosis percentages are based on NASCET measurement criteria. COMPARISON: CTA neck dated 05/20/2022 FINDINGS: Aortic arch and great vessels: There is a conventional left-sided 3 vessel arch.  The aortic arch is widely patent.  There is stable soft and calcified plaque in the proximal left subclavian artery with a similar appearance the prior study and possibly within radiation field but without critical stenosis or occlusion.  The right subclavian artery is patent.  The brachiocephalic trunk and origin of the left common carotid artery are patent. Carotid arteries Right carotid artery: There is calcified plaque scattered in the right common carotid artery without critical stenosis, occlusion or change.  There is no measurable stenosis of the internal carotid artery which is patent to the skull base. Left carotid artery: There is mild plaque scattered in the left common carotid artery without change and without critical stenosis or occlusion.  There is no measurable stenosis of the internal carotid artery. Vertebral arteries: The left vertebral artery is dominant and patent throughout its course in the neck.  The right vertebral artery is hypoplastic but patent.  There is no critical stenosis, occlusion, thrombus or dissection.  There is no change. The study extends intracranially.  There is calcified plaque in the V4 segment of the left vertebral artery resulting in a moderate to marked short segment nonocclusive stenosis.  The right vertebral artery partially terminates in a posteroinferior cerebellar artery with a short segment moderate to marked  stenosis of the very distal right vertebral artery.  Some flow within the distal right vertebral artery may represent retrograde flow from the dominant left vertebral artery.  The basilar artery is patent.  The origins of the superior cerebellar and posterior cerebral artery on the right are patent.  There is fetal origin of the left posterior cerebral artery which is patent. There is plaque in the cavernous segments of both internal carotid arteries but there is no critical stenosis or large vessel occlusion of the anterior circulation vessels.  There is no large aneurysm. Other: There is a similar appearance of the included upper lungs with pleural and parenchymal changes anteriorly in the upper lobes bilaterally.  As previously discussed, timing of the contrast bolus is performed during the arterial phase for CTA neck.  Therefore, enhancement of the soft tissues is somewhat suboptimal due to this technique. There has been a large region of soft tissue resection in the anterior mid and lower neck soft tissues with continued open defect in the upper chest and lower neck above the manubrium.  Soft tissue seen along the left posterolateral border of the trachea could represent secretions or mucus.  This was present previously. Redemonstrated is a large heterogeneously enhancing lesion centered at the level of the tongue base and floor of the mouth centrally into the left.  There is scattered calcifications.  The prior CTA demonstrated regions of central necrosis which are no longer definitely present but diffusely heterogeneous density and enhancement likely represents regions of necrosis.  This large heterogeneously enhancing soft tissue mass extends more anteriorly in the floor of the mouth on the left and measures at least 5.6 cm in greatest anterior to posterior dimension with 3.6 cm transverse dimension.  This does extend anteriorly to the medial margin of the body of the mandible on the left. There are post  treatment changes with stranding in the neck fat.     1. Similar appearance of the neck vessels when compared to the prior CTA neck with mild narrowing at the origin of the left subclavian artery.  There is no critical stenosis, occlusion, thrombosis or dissection involving the cervical vertebral or carotid arteries. 2. Larger mass within the hypopharynx and tongue base extending anteriorly to the floor of the mouth on the left to the medial margin of the body of the mandible without obvious bony destruction. 3. There is nonocclusive stenosis of the distal V4 segment of the left vertebral artery without change. 4. There is no large vessel occlusion or critical stenosis of the anterior circulation. 5. There is developmental variation with fetal origin of the left posterior cerebral artery. Electronically signed by: Rex Joyner MD Date:    09/20/2022 Time:    11:31    CT Abdomen Pelvis With Contrast    Result Date: 9/1/2022  CMS MANDATED QUALITY DATA - CT RADIATION - 436 All CT scans at this facility utilize dose modulation, iterative reconstruction, and/or weight based dosing when appropriate to reduce radiation dose to as low as reasonably achievable. Reason: abdominal pain partial history of laryngeal carcinoma TECHNIQUE: CT abdomen and pelvis with 100 mL Omnipaque 350. COMPARISON: PET/CT 5/13/2022 CT ABDOMEN: Coronary artery calcification and extremely tiny hiatal hernia incidentally noted. Visualized lung bases are clear. Liver, gallbladder, pancreas, spleen, adrenals, and kidneys are normal. Mild aortoiliac calcifications present. Gastrostomy tube tip lies in distal gastric body. Small intestines are unremarkable. Mild wall thickening affects the ascending colon with pneumatosis intestinalis diffusely affecting the ascending colon. No other free intraperitoneal gas or, and remainder of colon is normal. A normal appendix is present. The major mesenteric vascular structures are patent. No acute osseous  abnormality. CT PELVIS: Prostate is slightly enlarged. Bladder is normal. No free pelvic fluid. Tiny right and small to moderate left fat-containing inguinal hernias are present. No acute osseous abnormality. IMPRESSION: 1. Pneumatosis intestinalis affecting the ascending colon with associated mild wall thickening. Etiology of this is undetermined. Potential considerations include intestinal ischemia or pneumatosis related to chemotherapy. Clinical and laboratory correlation is needed to assess significance. No portal venous gas or free intraperitoneal air however. 2. Coronary artery calcifications Electronically signed by:  Nael Hui MD  9/1/2022 6:20 PM CDT Workstation: [id number]    NM PET CT Routine Skull to Mid Thigh    Result Date: 9/27/2022  PET CT WITH IMAGE FUSION HISTORY:  restage tongue cancer RESTAGING...  HX: TONGUE CA.  DX: April 2022.  LAST CHEMO TREATMENT WAS 3WKS AGO.  EVALUATE TX RESPONSE.   ALSO HX OF LARYNGEAL CA IN AUGUST 2020.  MULTIPLE SURGERIES: LARYNGECTOMY, THYROIDECTOMY & TRACHEOSTOMY.  RAD TX WAS COMPLETED IN NOV 2020. TECHNIQUE: Following IV administration of 11.2 mCi of F-18 labeled FDG into right antecubital fossa and a 60 minute delay, PET CT was performed from the vertex of the skull through the proximal thighs with an integrated PET CT scanner with image fusion. CT images were obtained to aid in attenuation correction and PET localization. The patient's serum glucose at the time of the exam was 136 mg/dL. COMPARISON: PET/CT 5/13/2022 FINDINGS: Poorly characterized soft tissue mass involving base of tongue approximately measures 4.3 x 2.8 cm (series 3 image 65) appearing similar to the prior exam. This reaches current max SUV of 11.8, not significantly changed from prior of 11.4. No other abnormal FDG activity. Intracranial compartment is unremarkable. Trace bilateral maxillary sinus mucosal thickening is present. Postoperative changes of laryngectomy and tracheostomy are  present. Surgical clips occur throughout the neck. No definite enlarged cervical or supraclavicular lymph node, with evaluation limited by lack of IV contrast. Left subclavian port catheter tip terminates in SVC. Diffuse coronary artery calcifications are present. No enlarged mediastinal or axillary lymph nodes. No pulmonary nodule or mass. Gastrostomy tube tip lies in gastric body. Moderate aortoiliac calcifications are present. Small fat-containing left inguinal hernia incidentally noted. Mild degenerative changes affect the spine. No suspicious osseous abnormality. IMPRESSION: 1. No significant change in the FDG avid mass involving the tongue base, remaining characteristic of malignancy. 2. No new FDG avid malignancy or metastatic disease. Electronically signed by:  Nael Hui MD  9/27/2022 2:38 PM CDT Workstation: [id number]    CT Abdomen Pelvis  Without Contrast    Result Date: 9/4/2022  CMS MANDATED QUALITY DATA - CT RADIATION  436 All CT scans at this facility utilize dose modulation, iterative reconstruction, and/or weight based dosing when appropriate to reduce radiation dose to as low as reasonably achievable. CT ABDOMEN PELVIS WITHOUT IV CONTRAST CLINICAL HISTORY: 72 years Male Follow-up pneumatosis COMPARISON: CT abdomen and pelvis September 1, 2022 FINDINGS: Imaging through the lower thorax demonstrates subsegmental atelectasis of the dependent lower lobes. Coronary artery calcification. Bone window images show no acute or aggressive osseous abnormality. Transitional lumbosacral vertebral body. On this unenhanced exam, no focal hepatic lesion. Gallbladder and biliary tree are unremarkable. Spleen appears normal. Pancreas is unremarkable. No adrenal lesion. No renal calculi or hydronephrosis. Ureters are normal in caliber. Urinary bladder is within normal limits. Gastrostomy tube is in place within the stomach. Stomach is largely collapsed. No evidence of small bowel obstruction. Pneumatosis and  pericolonic abscess involving the ascending colon is redemonstrated, slightly diminished compared to prior. Mild wall thickening throughout the colon. No free fluid or free air within the abdomen or pelvis. Aortoiliac atherosclerotic calcification. No pathologically enlarged lymph nodes within the abdomen or pelvis. Bilateral fat-containing inguinal hernias, larger on the left. IMPRESSION: Pneumatosis and pericolonic gas about the ascending colon has decreased in volume compared to prior. Persistent colonic wall thickening which could indicate colitis. Coronary and aortic atherosclerosis. Gastrostomy tube is within the stomach. Bilateral inguinal hernias. Electronically signed by:  Jose De Jesus Oleary MD  9/4/2022 4:06 PM CDT Workstation: [id number]    CTA neck  5/20/2022:     IMPRESSION:  Persistent mass within the hypopharynx consistent with malignancy.  By my measurements it is larger than on April 24, 2022 with central necrosis.  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 This is similar to the previous April 2022 study.  No evidence of arterial compromise related to malignancy.     PET 5/13/2022:     IMPRESSION:  1. Postoperative changes of prior laryngectomy and lymph node resection/radiation.  2. Masslike FDG avid lesion to the left of midline at the tongue base concerning for residual/recurrent disease.  3. Adjacent confluent nodular extension along the inferior left side of the mass.  4. No FDG avid lymphadenopathy or convincing additional sites of disease in the chest, abdomen or pelvis.     CT head 5/10/2022:  FINDINGS: Comparison to multiple prior exams. There is no acute intracranial hemorrhage, with no mass effect or abnormal extra-axial fluid. Mild scattered areas of nonspecific hypoattenuation involve the deep periventricular white matter, with gray-white differentiation maintained.     There is mild generalized prominence of the cortical sulci  and ventricles. The cerebellum and brainstem are unremarkable. There are carotid siphon vascular calcifications. The visualized paranasal sinuses and mastoid air cells are clear. There is no acute calvarial fracture or scalp hematoma.     IMPRESSION: No acute intracranial hemorrhage or acute calvarial fracture     CT soft neck 4/24/2022;     Oral tongue/tongue base:  At the base of the tongue on the left there are findings of a heterogeneously enhancing mass measuring 2.1 cm highly concerning for a neoplastic process.     True and false cords:  Patient status post laryngectomy which has been performed in the interim. Soft tissue stranding in the lower neck likely related to a previous flat reconstruction. No enhancement or lymphadenopathy within the lower neck.     Lymph node assessment:Negative     Surrounding soft tissues:  Negative     Vasculature:  Negative     Osseous structures:  Negative     Lung apices:  Scarring involving the lung apices bilaterally likely related to previous radiation.     IMPRESSION:  1. Postoperative and radiation changes involving the lower neck.  2. Findings highly concerning for a developing mass at the left base of the tongue enhancing 2.1 cm region. Direct visualization in this region would be of benefit.        CT soft neck  12/24/2021  IMPRESSION:  1.  Laryngeal mass as on prior exam. Laryngeal airway narrowing has not changed.  2.  No evidence for active hemorrhage.  3.  Partially visualized patchy groundglass infiltrates in both upper lobes. Also see CT chest report     CTA Chest 12/24/2021:  IMPRESSION:     1.  No pulmonary embolism.     2.  Soft tissue stranding in the region of the strap musculature with ill-defined hypodensity measuring 2.8 x 1.4 cm in the cervical midline.  Findings concerning for infectious process or abscess. Neoplastic process could have similar imaging characteristics.     3.  Suggested erosion of the proximal right parasymphyseal aspect of the thyroid  shield.  Correlated with CT soft tissue neck findings. Findings could represent osteomyelitis. Neoplastic process cannot be excluded.     4.  Hepatic steatosis.  5.  Thickening of the gastric wall. Prominence of perigastric vasculature. Small hiatal hernia.     6.  Moderate stool burden.  Correlate for constipation.        PET 12/15/2021:  IMPRESSION:     Substantial qualitative and quantitative increase of hypermetabolic FDG activity associated with the larynx in this patient with known supraglottic neoplasm.     No evidence of FDG avid metastatic disease involving neck, chest, abdomen or pelvis.     PET 8/21/2020:     Impression:     1. Intensely hypermetabolic plaque-like mass along the left true vocal cord, compatible with known laryngeal carcinoma.  2. No findings of regional metastatic disease in the neck, or distant metastatic disease.        CT Chest 8/10/2020:     IMPRESSION:     No metastatic disease within the chest.  Three-vessel coronary artery calcification. Nondilated cardiac  chambers     CT Soft neck 7/22/2020:  IMPRESSION:  1. Slight interval increase in size of the previously described  enhancing nodule along the anterior left vocal cord.  2. No pathologic lymphadenopathy.  3. Additional and incidental findings as noted above.     CT Soft neck  3/16/2020:     Impression:     6 mm enhancing focus involving the superior aspect of the left focal cord near the anterior commisure.  Recommend direct visualization for further evaluation of laryngeal polyp     Question of 2 mm submucosal lipoma involving the midportion of the superior aspect of the left vocal cord.     Mild arteriosclerosis involving the brachiocephalic arteries and carotid arteries     Mild degenerative change of the cervical spine        I have reviewed all available lab results and radiology reports.    Assessment/Plan:   (1) 72 y.o. 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     - Patient has been under the care of Dr Khoobehi with [facility] and Dr Poritllo with rad/onc.   - He was recently found to have a new primary cancer involving the base of his tongue in April 2022. He was deemed not to be a candidate for any further radiation and did not want to undergo any further surgery as this would necessitate a glossectomy procedure.   - He has been on adjuvant chemotherapy per direction of Dr Khoobehi and has had 3 cycles. His therapy course has been complicated with persistent diarrhea and N/V.      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 He saw Dr Lucero with Rad/onc this am. They are going [place] [facility] [facility] [place] James about surgical options. If he is not a surgical candidate then will proceed with cisplatin and XRT combine therapy.     10/6/2022:  - He saw Dr Lucero with Rad/onc, and Dr James and his case was presented to [facility] Tumor Board [place] [facility] and  he general consensus was to proceed to surgery.  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 He had a CT scan on  3/16/2020 which showed a 6mm nodule on the left vocal cord. A repeat Ct scan four months later on 7/22/2020 showed the nodule enlarged to 1.4 x 1.1 cm in size. Patient was then seen by Dr Noel and underwent repeat scope in Aug 2020 who found a bulky deeply invading tumor which was debulked and the pathology coming back moderately differentiated SCCA without lymphovascular or perineural invasion. Hs case was subsequently presented to the tumor board and the consensus was to proceed with radiation. He completed XRT on 10/30/2020 and proceeded with regular laryngoscopy surveillance. He eventually required salvage laryngectomy and neck dissection with flap with Dr James on Jan 6th 2022. 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 Fifty lymph nodes were removed which were all negative. Pathology TNM was pT4a, pN0. Patient did not require any adjuvant therapy afterwards.      (2) HTN and hypercholesterolemia     (3) Paroxysmal atrial fibrillation, V tach     (4) DM II     (5) B12 deficiency      (6) Fatty liver disease, GERD           VISIT DIAGNOSES:      Larynx cancer    Primary cancer of base of tongue    Iron deficiency anemia due to chronic blood loss    Vitamin B12 deficiency    Fatty liver disease, nonalcoholic    Abnormal CT scan, neck        PLAN:  Proceed with planned surgery  F/u with Dr James with ENT and Dr Lucero with rad/onc  Check labs weekly  F/u with PCP, ENT, etc     RTC in  4 weeks  Fax note to Khoobehi, Aurash, MD, Bandar/Dameon Lucero Hasney, Yesenia Gomez       Discussion:     COVID-19 Discussion:     I had long discussion with patient and any applicable family about the COVID-19 coronavirus epidemic and the recommended precautions with regard to cancer and/or hematology patients. I have re-iterated the CDC recommendations for adequate hand washing, use  "of hand -like products, and coughing into elbow, etc. In addition, especially for our patients who are on chemotherapy and/or our otherwise immunocompromised patients, I have recommended avoidance of crowds, including movie theaters, restaurants, churches, etc. I have recommended avoidance of any sick or symptomatic family members and/or friends. I have also recommended avoidance of any raw and unwashed food products, and general avoidance of food items that have not been prepared by themselves. The patient has been asked to call us immediately with any symptom developments, issues, questions or other general concerns.         Pathology Discussion:     I reviewed and discussed the pathology report(s) and radiograph reports (if available) in as simple to understand and/or laymen's terms to the best of my ability. I had an indepth conversation with the patient and went over the patient's individual diagnosis based on the information that was currently available. I discussed the TNM staging process with regard to the patient's particular cancer type, and the calculated stage based on the currently available TNM data and literature. I discussed the available prognostic data with regard to the current staging information and how it relates to the prognosis of their particular neoplastic process.          NCCN Guidelines:     I discussed the available treatment option(s) in accordance with the latest literature from the NCCN Clinical Practice Guidelines for the patient's particular type of cancer disorder. The NCCN Guidelines provide a "document evidence-based (and) consensus-driven management" of the care of oncology patients. The treatment recommendations were made not only in accordance to the NCCN guidelines, but also factored in to account the patient's overall age, condition, performance status and their medical co-morbidities. I went over the risks and benefits of the the treatment options (if any could be " made) with regard to their particular cancer type, their cancer stage, their age, and their co-morbidities.         I have explained and the patient understands all of  the current recommendation(s). I have answered all of their questions to the best of my ability and to their complete satisfaction.         I spent over 25 mins of time with the patient. Reviewed results of the recently ordered labs, tests and studies; made directives with regards to the results. Over half of this time was spent couseling and coordinating care.    I have explained all of the above in detail and the patient understands all of the current recommendation(s). I have answered all of their questions to the best of my ability and to their complete satisfaction.   The patient is to continue with the current management plan.            Electronically signed by Venu Tamez MD            Answers submitted by the patient for this visit:  Review of Systems Questionnaire (Submitted on 10/4/2022)  appetite change : No  unexpected weight change: No  mouth sores: No  visual disturbance: Yes  cough: Yes  shortness of breath: Yes  chest pain: No  abdominal pain: No  diarrhea: No  frequency: No  back pain: No  rash: No  headaches: No  adenopathy: No  nervous/ anxious: No

## 2022-10-06 ENCOUNTER — PATIENT MESSAGE (OUTPATIENT)
Dept: SURGICAL ONCOLOGY | Facility: CLINIC | Age: 72
End: 2022-10-06
Payer: MEDICARE

## 2022-10-06 ENCOUNTER — OFFICE VISIT (OUTPATIENT)
Dept: HEMATOLOGY/ONCOLOGY | Facility: CLINIC | Age: 72
End: 2022-10-06
Payer: MEDICARE

## 2022-10-06 ENCOUNTER — TELEPHONE (OUTPATIENT)
Dept: HEMATOLOGY/ONCOLOGY | Facility: CLINIC | Age: 72
End: 2022-10-06

## 2022-10-06 VITALS
BODY MASS INDEX: 22.11 KG/M2 | TEMPERATURE: 99 F | WEIGHT: 137 LBS | DIASTOLIC BLOOD PRESSURE: 70 MMHG | HEART RATE: 92 BPM | SYSTOLIC BLOOD PRESSURE: 117 MMHG

## 2022-10-06 DIAGNOSIS — E53.8 VITAMIN B12 DEFICIENCY: ICD-10-CM

## 2022-10-06 DIAGNOSIS — C01 PRIMARY CANCER OF BASE OF TONGUE: ICD-10-CM

## 2022-10-06 DIAGNOSIS — Z71.3 NUTRITIONAL COUNSELING: ICD-10-CM

## 2022-10-06 DIAGNOSIS — C32.9 LARYNX CANCER: Primary | ICD-10-CM

## 2022-10-06 DIAGNOSIS — R93.89 ABNORMAL CT SCAN, NECK: ICD-10-CM

## 2022-10-06 DIAGNOSIS — R63.4 WEIGHT LOSS: ICD-10-CM

## 2022-10-06 DIAGNOSIS — D50.0 IRON DEFICIENCY ANEMIA DUE TO CHRONIC BLOOD LOSS: ICD-10-CM

## 2022-10-06 DIAGNOSIS — K76.0 FATTY LIVER DISEASE, NONALCOHOLIC: ICD-10-CM

## 2022-10-06 PROCEDURE — 3074F PR MOST RECENT SYSTOLIC BLOOD PRESSURE < 130 MM HG: ICD-10-PCS | Mod: CPTII,S$GLB,, | Performed by: INTERNAL MEDICINE

## 2022-10-06 PROCEDURE — 4010F ACE/ARB THERAPY RXD/TAKEN: CPT | Mod: CPTII,S$GLB,, | Performed by: INTERNAL MEDICINE

## 2022-10-06 PROCEDURE — 1111F PR DISCHARGE MEDS RECONCILED W/ CURRENT OUTPATIENT MED LIST: ICD-10-PCS | Mod: CPTII,S$GLB,, | Performed by: INTERNAL MEDICINE

## 2022-10-06 PROCEDURE — 1101F PR PT FALLS ASSESS DOC 0-1 FALLS W/OUT INJ PAST YR: ICD-10-PCS | Mod: CPTII,S$GLB,, | Performed by: INTERNAL MEDICINE

## 2022-10-06 PROCEDURE — 1125F PR PAIN SEVERITY QUANTIFIED, PAIN PRESENT: ICD-10-PCS | Mod: CPTII,S$GLB,, | Performed by: INTERNAL MEDICINE

## 2022-10-06 PROCEDURE — 3044F PR MOST RECENT HEMOGLOBIN A1C LEVEL <7.0%: ICD-10-PCS | Mod: CPTII,S$GLB,, | Performed by: INTERNAL MEDICINE

## 2022-10-06 PROCEDURE — 3066F NEPHROPATHY DOC TX: CPT | Mod: CPTII,S$GLB,, | Performed by: INTERNAL MEDICINE

## 2022-10-06 PROCEDURE — 3060F PR POS MICROALBUMINURIA RESULT DOCUMENTED/REVIEW: ICD-10-PCS | Mod: CPTII,S$GLB,, | Performed by: INTERNAL MEDICINE

## 2022-10-06 PROCEDURE — 99214 OFFICE O/P EST MOD 30 MIN: CPT | Mod: S$GLB,,, | Performed by: INTERNAL MEDICINE

## 2022-10-06 PROCEDURE — 1101F PT FALLS ASSESS-DOCD LE1/YR: CPT | Mod: CPTII,S$GLB,, | Performed by: INTERNAL MEDICINE

## 2022-10-06 PROCEDURE — 3066F PR DOCUMENTATION OF TREATMENT FOR NEPHROPATHY: ICD-10-PCS | Mod: CPTII,S$GLB,, | Performed by: INTERNAL MEDICINE

## 2022-10-06 PROCEDURE — 3078F PR MOST RECENT DIASTOLIC BLOOD PRESSURE < 80 MM HG: ICD-10-PCS | Mod: CPTII,S$GLB,, | Performed by: INTERNAL MEDICINE

## 2022-10-06 PROCEDURE — 1159F PR MEDICATION LIST DOCUMENTED IN MEDICAL RECORD: ICD-10-PCS | Mod: CPTII,S$GLB,, | Performed by: INTERNAL MEDICINE

## 2022-10-06 PROCEDURE — 3078F DIAST BP <80 MM HG: CPT | Mod: CPTII,S$GLB,, | Performed by: INTERNAL MEDICINE

## 2022-10-06 PROCEDURE — 1125F AMNT PAIN NOTED PAIN PRSNT: CPT | Mod: CPTII,S$GLB,, | Performed by: INTERNAL MEDICINE

## 2022-10-06 PROCEDURE — 3008F PR BODY MASS INDEX (BMI) DOCUMENTED: ICD-10-PCS | Mod: CPTII,S$GLB,, | Performed by: INTERNAL MEDICINE

## 2022-10-06 PROCEDURE — 3044F HG A1C LEVEL LT 7.0%: CPT | Mod: CPTII,S$GLB,, | Performed by: INTERNAL MEDICINE

## 2022-10-06 PROCEDURE — 3288F PR FALLS RISK ASSESSMENT DOCUMENTED: ICD-10-PCS | Mod: CPTII,S$GLB,, | Performed by: INTERNAL MEDICINE

## 2022-10-06 PROCEDURE — 1159F MED LIST DOCD IN RCRD: CPT | Mod: CPTII,S$GLB,, | Performed by: INTERNAL MEDICINE

## 2022-10-06 PROCEDURE — 4010F PR ACE/ARB THEARPY RXD/TAKEN: ICD-10-PCS | Mod: CPTII,S$GLB,, | Performed by: INTERNAL MEDICINE

## 2022-10-06 PROCEDURE — 1160F PR REVIEW ALL MEDS BY PRESCRIBER/CLIN PHARMACIST DOCUMENTED: ICD-10-PCS | Mod: CPTII,S$GLB,, | Performed by: INTERNAL MEDICINE

## 2022-10-06 PROCEDURE — 3074F SYST BP LT 130 MM HG: CPT | Mod: CPTII,S$GLB,, | Performed by: INTERNAL MEDICINE

## 2022-10-06 PROCEDURE — 1160F RVW MEDS BY RX/DR IN RCRD: CPT | Mod: CPTII,S$GLB,, | Performed by: INTERNAL MEDICINE

## 2022-10-06 PROCEDURE — 3008F BODY MASS INDEX DOCD: CPT | Mod: CPTII,S$GLB,, | Performed by: INTERNAL MEDICINE

## 2022-10-06 PROCEDURE — 99214 PR OFFICE/OUTPT VISIT, EST, LEVL IV, 30-39 MIN: ICD-10-PCS | Mod: S$GLB,,, | Performed by: INTERNAL MEDICINE

## 2022-10-06 PROCEDURE — 1111F DSCHRG MED/CURRENT MED MERGE: CPT | Mod: CPTII,S$GLB,, | Performed by: INTERNAL MEDICINE

## 2022-10-06 PROCEDURE — 3060F POS MICROALBUMINURIA REV: CPT | Mod: CPTII,S$GLB,, | Performed by: INTERNAL MEDICINE

## 2022-10-06 PROCEDURE — 3288F FALL RISK ASSESSMENT DOCD: CPT | Mod: CPTII,S$GLB,, | Performed by: INTERNAL MEDICINE

## 2022-10-07 ENCOUNTER — OUTPATIENT CASE MANAGEMENT (OUTPATIENT)
Dept: ADMINISTRATIVE | Facility: OTHER | Age: 72
End: 2022-10-07
Payer: MEDICARE

## 2022-10-07 NOTE — PROGRESS NOTES
Outpatient Care Management  Plan of Care Follow Up Visit    Patient: Cyrus Batres Jr.  MRN: [id number]  Date of Service: 10/07/2022  Completed by: Eugenia Agudelo RN  Referral Date: 09/02/2022    Reason for Visit   Patient presents with    OPCM RN First Follow-Up Attempt     10/07/22    OPCM RN Second Follow-Up Attempt     10/10/22-letter thru portal     Update Plan Of Care     10/13/22       Brief Summary: 10/06/22-Attempt follow up with  patient/caregiver  for outpatient case management. Not available. RN OPCM 1'st follow up attempt.   10/10/22-Attempt follow up with  patient/caregiver for outpatient case management. No answer. Left message requesting call back.  RN JONATHANM 2'nd follow up attempt. Letter thru my chart.  10/13/22-Weight 137 pounds.On 9/29/22 pet scan results reviewed with Dr. James , otolaryngology and appt with Dr. Tamez, oncology. On 10/04/22  tumor board follow up and they do feel that his tumor is resectable though it would require a massive operation to resect the tumor. Appt on 10/06/22 with Dr. Tamez, oncology. Mr. Batres is going to proceed with pharyngectomy on 11/09/22. He is having some fatigue but working outside. Denies headache, nausea, vomiting,sore throat or skin breakdown.    Appt with PCP today. Will talk to him about possible gout in left big toe.  He will  Banatrol samples tomorrow to help control diarrhea.He is having a little diarrhea. Dietician  appt on 10/14/22. Wife will ask oncology about flu shot.   CM ACTION PLAN   : Follow up in two weeks per reqeust  Call on Tuesday or Wednesday around 4 pm     Patient Summary     Involvement of Care:  Do I have permission to speak with other family members about your care?       Patient Reported Labs & Vitals:  1.  Any Patient Reported Labs & Vitals?   N/a  2.  Patient Reported Blood Pressure:   n/a  3.  Patient Reported Pulse:   n/a  4.  Patient Reported Weight (Kg):   137  5.  Patient Reported Blood Glucose (mg/dl):    n/a    Medical and social history was reviewed with patient and/or caregiver.     Clinical Assessment     Reviewed and provided basic information on available community resources for mental health, transportation, wellness resources, and palliative care programs with patient and/or caregiver.     Complex Care Plan     Care plan was discussed and completed today with input from patient and/or caregiver.    Patient Instructions     Instructions were provided via the [facility] patient resources and are available for the patient to view on the patient portal.      Follow up in about 18 days (around 10/25/2022) for RN Follow up call.    Todays OPCM Self-Management Care Plan was developed with the patients/caregivers input and was based on identified barriers from todays assessment.  Goals were written today with the patient/caregiver and the patient has agreed to work towards these goals to improve his/her overall well-being. Patient verbalized understanding of the care plan, goals, and all of today's instructions. Encouraged patient/caregiver to communicate with his/her physician and health care team about health conditions and the treatment plan.  Provided my contact information today and encouraged patient/caregiver to call me with any questions as needed.

## 2022-10-09 ENCOUNTER — PATIENT MESSAGE (OUTPATIENT)
Dept: HEMATOLOGY/ONCOLOGY | Facility: CLINIC | Age: 72
End: 2022-10-09

## 2022-10-10 ENCOUNTER — PATIENT MESSAGE (OUTPATIENT)
Dept: ADMINISTRATIVE | Facility: OTHER | Age: 72
End: 2022-10-10
Payer: MEDICARE

## 2022-10-12 DIAGNOSIS — C01 SQUAMOUS CELL CARCINOMA OF BASE OF TONGUE: ICD-10-CM

## 2022-10-12 DIAGNOSIS — C32.9 LARYNX CANCER: Primary | ICD-10-CM

## 2022-10-12 DIAGNOSIS — C01 MALIGNANT NEOPLASM OF BASE OF TONGUE: ICD-10-CM

## 2022-10-12 RX ORDER — SODIUM CHLORIDE 0.9 % (FLUSH) 0.9 %
10 SYRINGE (ML) INJECTION
Status: CANCELLED | OUTPATIENT
Start: 2022-10-12

## 2022-10-12 RX ORDER — LIDOCAINE HYDROCHLORIDE 10 MG/ML
1 INJECTION, SOLUTION EPIDURAL; INFILTRATION; INTRACAUDAL; PERINEURAL ONCE
Status: CANCELLED | OUTPATIENT
Start: 2022-10-12 | End: 2022-10-12

## 2022-10-13 ENCOUNTER — OFFICE VISIT (OUTPATIENT)
Dept: FAMILY MEDICINE | Facility: CLINIC | Age: 72
End: 2022-10-13
Payer: MEDICARE

## 2022-10-13 VITALS
BODY MASS INDEX: 22.34 KG/M2 | SYSTOLIC BLOOD PRESSURE: 96 MMHG | WEIGHT: 139 LBS | HEIGHT: 66 IN | DIASTOLIC BLOOD PRESSURE: 59 MMHG | HEART RATE: 97 BPM

## 2022-10-13 DIAGNOSIS — E89.0 HISTORY OF TOTAL THYROIDECTOMY: ICD-10-CM

## 2022-10-13 DIAGNOSIS — D63.0 ANEMIA IN NEOPLASTIC DISEASE: ICD-10-CM

## 2022-10-13 DIAGNOSIS — Z93.1 PRESENCE OF EXTERNALLY REMOVABLE PERCUTANEOUS ENDOSCOPIC GASTROSTOMY (PEG) TUBE: ICD-10-CM

## 2022-10-13 DIAGNOSIS — I10 ESSENTIAL HYPERTENSION: Primary | Chronic | ICD-10-CM

## 2022-10-13 DIAGNOSIS — M79.675 TOE PAIN, LEFT: ICD-10-CM

## 2022-10-13 DIAGNOSIS — Z23 NEED FOR INFLUENZA VACCINATION: ICD-10-CM

## 2022-10-13 DIAGNOSIS — R73.9 ELEVATED BLOOD SUGAR: Chronic | ICD-10-CM

## 2022-10-13 DIAGNOSIS — Z93.0 TRACHEOSTOMY TUBE PRESENT: ICD-10-CM

## 2022-10-13 PROCEDURE — 3074F SYST BP LT 130 MM HG: CPT | Mod: CPTII,S$GLB,, | Performed by: INTERNAL MEDICINE

## 2022-10-13 PROCEDURE — 3078F PR MOST RECENT DIASTOLIC BLOOD PRESSURE < 80 MM HG: ICD-10-PCS | Mod: CPTII,S$GLB,, | Performed by: INTERNAL MEDICINE

## 2022-10-13 PROCEDURE — 1125F AMNT PAIN NOTED PAIN PRSNT: CPT | Mod: CPTII,S$GLB,, | Performed by: INTERNAL MEDICINE

## 2022-10-13 PROCEDURE — 3288F FALL RISK ASSESSMENT DOCD: CPT | Mod: CPTII,S$GLB,, | Performed by: INTERNAL MEDICINE

## 2022-10-13 PROCEDURE — 3044F HG A1C LEVEL LT 7.0%: CPT | Mod: CPTII,S$GLB,, | Performed by: INTERNAL MEDICINE

## 2022-10-13 PROCEDURE — 3078F DIAST BP <80 MM HG: CPT | Mod: CPTII,S$GLB,, | Performed by: INTERNAL MEDICINE

## 2022-10-13 PROCEDURE — 3066F PR DOCUMENTATION OF TREATMENT FOR NEPHROPATHY: ICD-10-PCS | Mod: CPTII,S$GLB,, | Performed by: INTERNAL MEDICINE

## 2022-10-13 PROCEDURE — 1160F RVW MEDS BY RX/DR IN RCRD: CPT | Mod: CPTII,S$GLB,, | Performed by: INTERNAL MEDICINE

## 2022-10-13 PROCEDURE — 4010F PR ACE/ARB THEARPY RXD/TAKEN: ICD-10-PCS | Mod: CPTII,S$GLB,, | Performed by: INTERNAL MEDICINE

## 2022-10-13 PROCEDURE — 3074F PR MOST RECENT SYSTOLIC BLOOD PRESSURE < 130 MM HG: ICD-10-PCS | Mod: CPTII,S$GLB,, | Performed by: INTERNAL MEDICINE

## 2022-10-13 PROCEDURE — G0008 ADMIN INFLUENZA VIRUS VAC: HCPCS | Mod: S$GLB,,, | Performed by: INTERNAL MEDICINE

## 2022-10-13 PROCEDURE — 4010F ACE/ARB THERAPY RXD/TAKEN: CPT | Mod: CPTII,S$GLB,, | Performed by: INTERNAL MEDICINE

## 2022-10-13 PROCEDURE — G0008 FLU VACCINE - QUADRIVALENT - HIGH DOSE (65+) PRESERVATIVE FREE IM: ICD-10-PCS | Mod: S$GLB,,, | Performed by: INTERNAL MEDICINE

## 2022-10-13 PROCEDURE — 3044F PR MOST RECENT HEMOGLOBIN A1C LEVEL <7.0%: ICD-10-PCS | Mod: CPTII,S$GLB,, | Performed by: INTERNAL MEDICINE

## 2022-10-13 PROCEDURE — 1125F PR PAIN SEVERITY QUANTIFIED, PAIN PRESENT: ICD-10-PCS | Mod: CPTII,S$GLB,, | Performed by: INTERNAL MEDICINE

## 2022-10-13 PROCEDURE — 99214 OFFICE O/P EST MOD 30 MIN: CPT | Mod: 25,S$GLB,, | Performed by: INTERNAL MEDICINE

## 2022-10-13 PROCEDURE — 1160F PR REVIEW ALL MEDS BY PRESCRIBER/CLIN PHARMACIST DOCUMENTED: ICD-10-PCS | Mod: CPTII,S$GLB,, | Performed by: INTERNAL MEDICINE

## 2022-10-13 PROCEDURE — 3060F POS MICROALBUMINURIA REV: CPT | Mod: CPTII,S$GLB,, | Performed by: INTERNAL MEDICINE

## 2022-10-13 PROCEDURE — 1101F PT FALLS ASSESS-DOCD LE1/YR: CPT | Mod: CPTII,S$GLB,, | Performed by: INTERNAL MEDICINE

## 2022-10-13 PROCEDURE — 3066F NEPHROPATHY DOC TX: CPT | Mod: CPTII,S$GLB,, | Performed by: INTERNAL MEDICINE

## 2022-10-13 PROCEDURE — 3288F PR FALLS RISK ASSESSMENT DOCUMENTED: ICD-10-PCS | Mod: CPTII,S$GLB,, | Performed by: INTERNAL MEDICINE

## 2022-10-13 PROCEDURE — 90662 IIV NO PRSV INCREASED AG IM: CPT | Mod: S$GLB,,, | Performed by: INTERNAL MEDICINE

## 2022-10-13 PROCEDURE — 1159F MED LIST DOCD IN RCRD: CPT | Mod: CPTII,S$GLB,, | Performed by: INTERNAL MEDICINE

## 2022-10-13 PROCEDURE — 99214 PR OFFICE/OUTPT VISIT, EST, LEVL IV, 30-39 MIN: ICD-10-PCS | Mod: 25,S$GLB,, | Performed by: INTERNAL MEDICINE

## 2022-10-13 PROCEDURE — 1101F PR PT FALLS ASSESS DOC 0-1 FALLS W/OUT INJ PAST YR: ICD-10-PCS | Mod: CPTII,S$GLB,, | Performed by: INTERNAL MEDICINE

## 2022-10-13 PROCEDURE — 1159F PR MEDICATION LIST DOCUMENTED IN MEDICAL RECORD: ICD-10-PCS | Mod: CPTII,S$GLB,, | Performed by: INTERNAL MEDICINE

## 2022-10-13 PROCEDURE — 90662 FLU VACCINE - QUADRIVALENT - HIGH DOSE (65+) PRESERVATIVE FREE IM: ICD-10-PCS | Mod: S$GLB,,, | Performed by: INTERNAL MEDICINE

## 2022-10-13 PROCEDURE — 3060F PR POS MICROALBUMINURIA RESULT DOCUMENTED/REVIEW: ICD-10-PCS | Mod: CPTII,S$GLB,, | Performed by: INTERNAL MEDICINE

## 2022-10-13 RX ORDER — LOSARTAN POTASSIUM 100 MG/1
50 TABLET ORAL DAILY
Qty: 45 TABLET | Refills: 3
Start: 2022-10-13 | End: 2024-02-05

## 2022-10-13 NOTE — PROGRESS NOTES
Subjective:       Patient ID: Cyrus Batres Jr. is a 72 y.o. male.    Chief Complaint: Diabetes, Hypertension, Foot Pain (Suspects gout), and Tongue Cancer    Patient is a 72-year-old gentleman who comes for follow-up.  Communication is mostly through his electronic scribble pad.  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  This prevents a rapid and real-time communication.    Underlying medical issues are as below:-    1.-hypertension  2.- hypothyroidism  3.-elevated blood sugars  4.-insomnia  5.-history of larynx cancer and new diagnosis of tongue cancer.    6.-pain in the left great toe.  7. -recent hospitalization for abdominal pain and found to have pneumatosis intestinalis.  Conservatively treated.  8.-tracheostomy status  9.-peg tube status and previously treated with Glucerna which he did not seem to be tolerating but currently changed to Jevity.    I have reviewed his oncology history starting from laryngeal cancer for which he had gone through treatment with chemo as well as radiation therapy.    New diagnosis of tongue cancer is low been noted and tumor conference has been done and patient wants to proceed with surgical resection of the tumor.  I did take the opportunity to review ENT notes by Dr. Ar echevarria and it is opined that he will have probably a massive surgery with resection of tongue and pharyngeal apparatus and adjoining skin thus and with need for grafting from Latissimus  dorsi and paucity of local blood vessels.    Patient will remain aphonic and will be fed permanently through PEG tube thereafter.  This may be the only curative treatment.    Patient also has pain in the left great toe at metatarsophalangeal joint.  Twenty years back he recalls that he had a attack of gout.  He does not recall eating any seafood or shrimp recently.  As to what might have provoked this gout is unclear.  He does not recall any trauma or injury.  He has taken some ibuprofen  and gradual relief of his pain in the great toe.      As far as blood sugars are concerned he states that he is doing fine.  I did review some blood sugars recently and there greater than 200.  I advised him about this finding and he seems to be somewhat nonchalant about it and does not want any escalation of further treatment.        Hypertension  This is a chronic problem. The current episode started more than 1 year ago. The problem is controlled. Associated symptoms include malaise/fatigue. Pertinent negatives include no neck pain, palpitations, peripheral edema or shortness of breath. Risk factors for coronary artery disease include male gender. Past treatments include angiotensin blockers. The current treatment provides significant improvement. There is no history of sleep apnea.   Foot Pain  This is a new problem. The current episode started in the past 7 days. The problem occurs constantly. The problem has been gradually improving. Associated symptoms include coughing and fatigue. Pertinent negatives include no abdominal pain, anorexia, arthralgias, chills, congestion, fever, joint swelling, nausea, neck pain, numbness, rash, sore throat or vomiting.     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     Employer: [employer]   Tobacco Use    Smoking status: Never    Smokeless tobacco: Never   Substance and Sexual Activity    Alcohol use: Not Currently     Comment: occasional    Drug use: No    Sexual activity: Yes     Partners: Female   Social History Narrative    2 children from his 1st wife     Past  Surgical History:   Procedure Laterality Date    DIRECT LARYNGOBRONCHOSCOPY N/A 12/27/2021    Procedure: LARYNGOSCOPY, DIRECT, WITH BRONCHOSCOPY;  Surgeon: Flex Espinosa MD;  [facility];  Service: ENT;  Laterality: N/A;    DISSECTION OF NECK Bilateral 1/6/2022    Procedure: DISSECTION, NECK;  Surgeon: Jesse James MD;  [facility];  Service: ENT;  Laterality: Bilateral;    FLAP PROCEDURE Right 1/6/2022    Procedure: CREATION, FREE FLAP;  Surgeon: Alise Hart MD;  [facility];  Service: ENT;  Laterality: Right;  Ischemic start 1351  Ischemic stop 1502    INSERTION OF TUNNELED CENTRAL VENOUS CATHETER (CVC) WITH SUBCUTANEOUS PORT N/A 6/9/2022    Procedure: [id number];  Surgeon: Jesus Viera MD;  Location: [facility];  Service: General;  Laterality: N/A;    LARYNGECTOMY N/A 1/6/2022    Procedure: LARYNGECTOMY;  Surgeon: Jesse James MD;  [facility];  Service: ENT;  Laterality: N/A;    LARYNGOSCOPY N/A 8/4/2020    Procedure: Suspension microlaryngoscopy with biopsy, possible KTP laser treatment/excision;  Surgeon: Stew Noel MD;  [facility];  Service: ENT;  Laterality: N/A;  Microscope, telescopes, tower, microinstruments, KTP laser, rep conf# [id number] IC 7/28.    LARYNGOSCOPY N/A 3/16/2021    Procedure: Suspension microlaryngoscopy with excision of lesion, possible CO2 laser;  Surgeon: Stew Noel MD;  Location: [facility] OR [address] [place] [address];  Service: ENT;  Laterality: N/A;  Microscope, telescopes, tower, microinstruments, CO2 laser, rep conf# [id number] IC 3/4.    LARYNGOSCOPY N/A 4/1/2021    Procedure: Suspension microlaryngoscopy with KTP laser excision of lesion;  Surgeon: Stew Noel MD;  [facility];  Service: ENT;  Laterality: N/A;  Microscope, telescopes, tower, microinstruments, 70 degree scope, vocal fold , KTP laser, rep conf# [id number] BC    LARYNGOSCOPY N/A 12/9/2021     Procedure: Suspension microlaryngoscopy with biopsy;  Surgeon: Stew Noel MD;  [address];  Service: ENT;  Laterality: N/A;  Microscope, telescopes, tower, microinstruments    LARYNGOSCOPY N/A 1/6/2022    Procedure: LARYNGOSCOPY;  Surgeon: Jesse James MD;  [address];  Service: ENT;  Laterality: N/A;    LARYNGOSCOPY N/A 4/27/2022    Procedure: LARYNGOSCOPY WITH BIOPSY;  Surgeon: Jesse James MD;  [address];  Service: ENT;  Laterality: N/A;    MICROLARYNGOSCOPY N/A 3/17/2020    Procedure: MICROLARYNGOSCOPY;  Surgeon: Jung Xiao MD;  Location: [facility];  Service: ENT;  Laterality: N/A;  Laser Microlaryngoscopy  NEED TO SCHEDULE LASER from [employer] [id number]    REIMPLANTATION OF PARATHYROID TISSUE N/A 1/6/2022    Procedure: REIMPLANTATION, PARATHYROID TISSUE;  Surgeon: Jesse James MD;  [address];  Service: ENT;  Laterality: N/A;    THYROIDECTOMY  1/6/2022    Procedure: THYROIDECTOMY;  Surgeon: Jesse James MD;  Location: [address];  Service: ENT;;    TRACHEOSTOMY N/A 12/27/2021    Procedure: CREATION, TRACHEOSTOMY;  Surgeon: Flex Espinosa MD;  Location: [address];  Service: ENT;  Laterality: N/A;     Family History   Problem Relation Age of Onset    Abnormal EKG Mother     Diabetes Father     Heart disease Father     Hypertension Father        Review of Systems   Constitutional:  Positive for fatigue and malaise/fatigue. Negative for activity change, appetite change, chills, fever and unexpected weight change (Overall has lost weight but gained some weight recently.).   HENT:  Positive for trouble swallowing. Negative for congestion, ear pain, hearing loss, rhinorrhea and sore throat.         Laryngeal cancer status post surgery.  Currently has a tracheotomy.  Secretions from tracheostomy tube somewhat more whitish and greenish.  Recent additional diagnosis of tongue cancer and will go  "through glossectomy for the same by Dr. James in [place].   Eyes:  Negative for discharge, redness and visual disturbance.   Respiratory:  Positive for cough. Negative for chest tightness, shortness of breath and wheezing.         History of laryngeal cancer.  Tracheostomy tube.   Cardiovascular:  Negative for palpitations and leg swelling.   Gastrointestinal:  Negative for abdominal distention, abdominal pain, anal bleeding, anorexia, blood in stool, constipation, diarrhea, nausea and vomiting.        PEG tube feeding.  Recent hospitalization for pneumatosis intestinalis.  Treated conservatively.  Had diarrhea but it stop.   Endocrine: Negative for cold intolerance, polydipsia and polyuria.        Elevated blood sugars noted.  He was on Glucerna previously and his sugars were higher.  Currently he is on Jevity.  Check A1c levels.   Genitourinary:  Negative for difficulty urinating, dysuria, flank pain, hematuria and urgency.   Musculoskeletal:  Negative for arthralgias, joint swelling and neck pain.        Left great toe pain.  He recalls having gout approximately 20 years back.  He took ibuprofen and is feeling better.   Skin:  Negative for color change, rash and wound.   Allergic/Immunologic: Positive for immunocompromised state. Negative for environmental allergies and food allergies.   Neurological:  Negative for dizziness, facial asymmetry and numbness.   Hematological:  Negative for adenopathy. Does not bruise/bleed easily.        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   Psychiatric/Behavioral:  Negative for agitation, behavioral problems, confusion and dysphoric mood.        Objective:      Blood pressure (!) 96/59, pulse 97, height 5' 6" (1.676 m), weight 63 kg (139 lb). Body mass index is 22.44 kg/m².  Physical Exam  Constitutional:       General: He is not in acute distress.     Appearance: He is ill-appearing. He is not diaphoretic.      Comments: BMI is " 22.44-somewhat chronically ill and emaciated   HENT:      Head: Normocephalic.        Comments: Tracheostomy tube noted in the neck.  Fitting well.  No infection.  Eyes:      General: No scleral icterus.  Neck:      Vascular: No carotid bruit.   Cardiovascular:      Rate and Rhythm: Tachycardia present.      Comments: Heart rate at arrival was 126.  Subsequently seem to settle down to 110.   Pulmonary:      Effort: Pulmonary effort is normal. No respiratory distress.      Breath sounds: Normal breath sounds. No wheezing or rhonchi.   Abdominal:      General: There is no distension.      Palpations: Abdomen is soft. There is no mass.      Tenderness: There is no abdominal tenderness.      Comments: Peg tube noted.   Musculoskeletal:         General: No swelling or tenderness.      Cervical back: No rigidity or tenderness.      Right lower leg: No edema.      Left lower leg: No edema.      Right foot: Deformity and bunion present.      Left foot: Deformity and bunion present.        Feet:    Feet:      Right foot:      Toenail Condition: Right toenails are abnormally thick.      Left foot:      Toenail Condition: Left toenails are abnormally thick. Fungal disease present.     Comments: Sensations are intact.  Skin:     Coloration: Skin is not jaundiced or pale.      Findings: No erythema or lesion.   Neurological:      Mental Status: He is alert. Mental status is at baseline.   Psychiatric:         Behavior: Behavior normal.          mild swelling in left metatarsophalangeal joint.  Dystrophic nails.    Peg tube in position.  Assessment:       1. Essential hypertension    2. 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 History of total thyroidectomy    5. Toe pain, left    6. Need for influenza vaccination    7. Tracheostomy tube present    8. Presence of externally removable percutaneous endoscopic gastrostomy (PEG) tube             Component      Latest Ref Rng & Units 10/5/2022 9/27/2022 9/3/2022    WBC      3.90 - 12.70 K/uL 7.15     RBC      4.60 - 6.20 M/uL 3.38 (L)     Hemoglobin      14.0 - 18.0 g/dL 10.6 (L)     Hematocrit      40.0 - 54.0 % 32.9 (L)     MCV      82 - 98 fL 97     MCH      27.0 - 31.0 pg 31.4 (H)     MCHC      32.0 - 36.0 g/dL 32.2     RDW      11.5 - 14.5 % 21.7 (H)     Platelets      150 - 450 K/uL 211     MPV      9.2 - 12.9 fL 10.0     Immature Granulocytes      0.0 - 0.5 % 1.0 (H)     Gran # (ANC)      1.8 - 7.7 K/uL 5.8     Immature Grans (Abs)      0.00 - 0.04 K/uL 0.07 (H)     Lymph #      1.0 - 4.8 K/uL 0.9 (L)     Mono #      0.3 - 1.0 K/uL 0.3     Eos #      0.0 - 0.5 K/uL 0.0     Baso #      0.00 - 0.20 K/uL 0.01     nRBC      0 /100 WBC 0     Gran %      38.0 - 73.0 % 80.6 (H)     Lymph %      18.0 - 48.0 % 13.1 (L)     Mono %      4.0 - 15.0 % 4.6     Eosinophil %      0.0 - 8.0 % 0.6     Basophil %      0.0 - 1.9 % 0.1     Differential Method       Automated     Sodium      136 - 145 mmol/L 137     Potassium      3.5 - 5.1 mmol/L 4.0     Chloride      95 - 110 mmol/L 104     CO2      23 - 29 mmol/L 26     Glucose      70 - 110 mg/dL 271 (H)     BUN      8 - 23 mg/dL 19     Creatinine      0.5 - 1.4 mg/dL 0.8     Calcium      8.7 - 10.5 mg/dL 9.4     PROTEIN TOTAL      6.0 - 8.4 g/dL 7.1     Albumin      3.5 - 5.2 g/dL 3.9     BILIRUBIN TOTAL      0.1 - 1.0 mg/dL 0.6     Alkaline Phosphatase      55 - 135 U/L 82     AST      10 - 40 U/L 20     ALT      10 - 44 U/L 16     Anion Gap      8 - 16 mmol/L 7 (L)     eGFR      >60 mL/min/1.73 m:2 >60.0     Prealbumin      20.0 - 43.0 mg/dL   19 (L)   POC Glucose      70 - 110  136 (H)    TSH      0.340 - 5.600 uIU/mL 1.020           Plan:           Essential hypertension  Comments:  Blood pressures are running on the low side and patient has cut down his losartan to 50 mg.  If it continues to remain low, may have to DC medications.  Orders:  -     losartan (COZAAR) 100 MG tablet; Take 0.5 tablets (50 mg total) by mouth once daily.   Dispense: 45 tablet; Refill: 3  -     Microalbumin/Creatinine Ratio, Urine; Future; Expected date: 10/14/2022    Elevated blood sugar  Comments:  Not sure if to diagnosed him with diabetes formally.  Degree of aggressive treatment also uncertain and patient does not desire insulin.  Orders:  -     Hemoglobin A1C; Future; Expected date: 10/14/2022    Anemia in neoplastic disease    History of total thyroidectomy    Toe pain, left  Comments:  Pt attributes this to gout attack.  Recall similar attack 20 years back.  Check uric acid level.  Cautions given the am not sure if there was any diet issue.  Orders:  -     Uric Acid; Future; Expected date: 10/14/2022    Need for influenza vaccination  -     Influenza - Quadrivalent - High Dose (65+) (PF) (IM)    Tracheostomy tube present    Presence of externally removable percutaneous endoscopic gastrostomy (PEG) tube    Pressures are running on the low side.  He takes half of 100 mg of losartan for his blood pressure.  Further lowering of blood pressures or perhaps lightheadedness and dizziness may necessitate further lowering of medications or probably stopping the medications.      Elevated blood sugars have been noted consistent with diabetes but I will defer the diagnosis till we are sure about his feeling and the timing of blood sugars.  At any rate he declines further escalation for treatment.    His tongue cancer which will necessitate a radical treatment with neck surgery and future consequences of this procedure are yet to be seen as to how it affects his quality of life and if any aggressive treatment for blood sugars or blood pressures are warranted.    Foot pain seems to be subsiding after a few doses of ibuprofen.  Not sure if he is in any catabolic state leading to hyperuricemia.  Will check uric acid level.  He would like to time the testing of these labs to his next oncology labs.    He takes trazodone as needed for sleep.    He takes opium tincture for  diarrhea.    He takes levothyroxine for the thyroid 100 mcg.    He takes Benadryl as needed for itching.    Tongue surgery will be done by Dr. James in [place].11/9    I have advised the patient to watch for his blood pressures.  His foot is doing okay.      Discussed about COVID precautions and immunizations.  He got the flu shot today.  Down the road will consider shingles vaccine and other immunizations.      Follow-up in 4 months.      Spent brittany 30 minutes with patient which involved review of pts medical conditions, labs, medications and with 50% of time face-to-face discussion about medical problems, management and any applicable changes.    Current Outpatient Medications:     acetaminophen (TYLENOL) 325 MG tablet, Take 325 mg by mouth every 6 (six) hours as needed for Pain., Disp: , Rfl:     diphenhydrAMINE (BENADRYL) 25 mg capsule, Take 25 mg by mouth every 6 (six) hours as needed for Itching., Disp: , Rfl:     esomeprazole (NEXIUM) 40 MG capsule, 40 mg before breakfast., Disp: , Rfl:     levothyroxine (SYNTHROID) 100 MCG tablet, Take 1 tablet (100 mcg total) by mouth before breakfast., Disp: 30 tablet, Rfl: 11    metFORMIN (GLUCOPHAGE) 500 MG tablet, Take 1 tablet (500 mg total) by mouth 2 (two) times daily with meals., Disp: 60 tablet, Rfl: 3    opium tincture 10 mg/mL (morphine) Tinc, Take 1 mL (10 mg total) by mouth 4 (four) times daily as needed., Disp: 118 mL, Rfl: 0    traZODone (DESYREL) 50 MG tablet, TAKE 1 TABLET BY MOUTH NIGHTLY AS NEEDED FOR INSOMNIA., Disp: 90 tablet, Rfl: 1    zolpidem (AMBIEN) 5 MG Tab, 1 tablet (5 mg total) by Per G Tube route nightly as needed (difficult with sleep)., Disp: 30 tablet, Rfl: 0    losartan (COZAAR) 100 MG tablet, Take 0.5 tablets (50 mg total) by mouth once daily., Disp: 45 tablet, Rfl: 3

## 2022-10-14 ENCOUNTER — CLINICAL SUPPORT (OUTPATIENT)
Dept: HEMATOLOGY/ONCOLOGY | Facility: CLINIC | Age: 72
End: 2022-10-14
Payer: MEDICARE

## 2022-10-14 ENCOUNTER — TELEPHONE (OUTPATIENT)
Dept: HEMATOLOGY/ONCOLOGY | Facility: CLINIC | Age: 72
End: 2022-10-14

## 2022-10-14 DIAGNOSIS — C32.9 LARYNX CANCER: ICD-10-CM

## 2022-10-14 DIAGNOSIS — C01 PRIMARY CANCER OF BASE OF TONGUE: ICD-10-CM

## 2022-10-14 DIAGNOSIS — R63.4 WEIGHT LOSS: ICD-10-CM

## 2022-10-14 NOTE — PROGRESS NOTES
Medical Nutrition Therapy Oncology Progress Note      Patient's PCP:Maico Patterson MD  Referring Provider: Dr. Venu Tamez  Subjective:        Patient ID: Cyrus Batres Jr. is a 72 y.o. male.    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;  Service: ENT;  Laterality: N/A;    DISSECTION OF NECK Bilateral 1/6/2022    Procedure: DISSECTION, NECK;  Surgeon: Jesse James MD;  [facility];  Service: ENT;  Laterality: Bilateral;    FLAP PROCEDURE Right 1/6/2022    Procedure: CREATION, FREE FLAP;  Surgeon: Alise Hart MD;  [address];  Service: ENT;  Laterality: Right;  Ischemic start 1351  Ischemic stop 1502    INSERTION OF TUNNELED CENTRAL VENOUS CATHETER (CVC) WITH SUBCUTANEOUS PORT N/A 6/9/2022    Procedure: [id number];  Surgeon: Jesus Viera MD;  Location: [facility];  Service: General;  Laterality: N/A;    LARYNGECTOMY N/A 1/6/2022    Procedure: LARYNGECTOMY;  Surgeon: Jesse James MD;  Location: [facility] OR [address] [place] [address];  Service: ENT;  Laterality: N/A;    LARYNGOSCOPY N/A 8/4/2020    Procedure: Suspension microlaryngoscopy with biopsy, possible KTP laser treatment/excision;  Surgeon: Stew Noel MD;  [facility];  Service: ENT;  Laterality: N/A;  Microscope, telescopes, tower, microinstruments, KTP laser, rep conf# [id number]  7/28.    LARYNGOSCOPY N/A 3/16/2021    Procedure: Suspension microlaryngoscopy with  excision of lesion, possible CO2 laser;  Surgeon: Stew Noel MD;  Location: [facility] OR 2ND FLR;  Service: ENT;  Laterality: N/A;  Microscope, telescopes, tower, microinstruments, CO2 laser, rep conf# [id number] IC 3/4.    LARYNGOSCOPY N/A 4/1/2021    Procedure: Suspension microlaryngoscopy with KTP laser excision of lesion;  Surgeon: Stew Noel MD;  Location: [facility] OR [facility] FLR;  Service: ENT;  Laterality: N/A;  Microscope, telescopes, tower, microinstruments, 70 degree scope, vocal fold , KTP laser, rep conf# [id number] BC    LARYNGOSCOPY N/A 12/9/2021    Procedure: Suspension microlaryngoscopy with biopsy;  Surgeon: Stew Noel MD;  [facility];  Service: ENT;  Laterality: N/A;  Microscope, telescopes, tower, microinstruments    LARYNGOSCOPY N/A 1/6/2022    Procedure: LARYNGOSCOPY;  Surgeon: Jesse James MD;  Location: [facility] OR [facility] FLR;  Service: ENT;  Laterality: N/A;    LARYNGOSCOPY N/A 4/27/2022    Procedure: LARYNGOSCOPY WITH BIOPSY;  Surgeon: Jesse James MD;  [facility];  Service: ENT;  Laterality: N/A;    MICROLARYNGOSCOPY N/A 3/17/2020    Procedure: MICROLARYNGOSCOPY;  Surgeon: Jung Xiao MD;  Location: [facility] OR;  Service: ENT;  Laterality: N/A;  Laser Microlaryngoscopy  NEED TO SCHEDULE LASER from [facility] [id number]    REIMPLANTATION OF PARATHYROID TISSUE N/A 1/6/2022    Procedure: REIMPLANTATION, PARATHYROID TISSUE;  Surgeon: Jesse James MD;  Location: [facility] OR [facility] FLR;  Service: ENT;  Laterality: N/A;    THYROIDECTOMY  1/6/2022    Procedure: THYROIDECTOMY;  Surgeon: Jesse James MD;  [facility];  Service: ENT;;    TRACHEOSTOMY N/A 12/27/2021    Procedure: CREATION, TRACHEOSTOMY;  Surgeon: Flex Espinosa MD;  Location: [facility] OR [facility] FLR;  Service: ENT;  Laterality: N/A;       Social History     Socioeconomic History    Marital status:      Spouse name: Janel Batres    Number of children: 2    Occupational History    Occupation: [employer]     Employer: [employer]   Tobacco Use    Smoking status: Never    Smokeless tobacco: Never   Substance and Sexual Activity    Alcohol use: Not Currently     Comment: occasional    Drug use: No    Sexual activity: Yes     Partners: Female   Social History Narrative    2 children from his 1st wife       Family History   Problem Relation Age of Onset    Abnormal EKG Mother     Diabetes Father     Heart disease Father     Hypertension Father        Review of patient's allergies indicates:   Allergen Reactions    Pollen extracts     Lovastatin Rash     Not confirmed but pt skeptical       Current Outpatient Medications:     acetaminophen (TYLENOL) 325 MG tablet, Take 325 mg by mouth every 6 (six) hours as needed for Pain., Disp: , Rfl:     diphenhydrAMINE (BENADRYL) 25 mg capsule, Take 25 mg by mouth every 6 (six) hours as needed for Itching., Disp: , Rfl:     esomeprazole (NEXIUM) 40 MG capsule, 40 mg before breakfast., Disp: , Rfl:     levothyroxine (SYNTHROID) 100 MCG tablet, Take 1 tablet (100 mcg total) by mouth before breakfast., Disp: 30 tablet, Rfl: 11    losartan (COZAAR) 100 MG tablet, Take 0.5 tablets (50 mg total) by mouth once daily., Disp: 45 tablet, Rfl: 3    metFORMIN (GLUCOPHAGE) 500 MG tablet, Take 1 tablet (500 mg total) by mouth 2 (two) times daily with meals., Disp: 60 tablet, Rfl: 3    opium tincture 10 mg/mL (morphine) Tinc, Take 1 mL (10 mg total) by mouth 4 (four) times daily as needed., Disp: 118 mL, Rfl: 0    traZODone (DESYREL) 50 MG tablet, TAKE 1 TABLET BY MOUTH NIGHTLY AS NEEDED FOR INSOMNIA., Disp: 90 tablet, Rfl: 1    zolpidem (AMBIEN) 5 MG Tab, 1 tablet (5 mg total) by Per G Tube route nightly as needed (difficult with sleep)., Disp: 30 tablet, Rfl: 0    All medications and past history have been reviewed.    OP HEAD NECK PEMBROLIZUMAB CARBOPLATIN FLUOROURACIL Q3W FOLLOWED BY MAINTENANCE PEMBROLIZUMAB 400 MG Q6W      Treatment Goal:    Palliative      Status:   Active      Start Date:   7/11/2022      End Date:   6/29/2024 (Planned)      Provider:   Aurash Khoobehi, MD      Chemotherapy:   CARBOplatin (PARAPLATIN) 440 mg in sodium chloride 0.9% 544 mL chemo infusion, 440 mg (100 % of original dose 438.5 mg), Intravenous, Clinic/HOD 1 time, 3 of 6 cycles    Dose modification:   (original dose 438.5 mg, Cycle 1)    Administration: 440 mg (7/11/2022), 435 mg (8/1/2022), 510 mg (8/22/2022)      Objective:      Wt Readings from Last 20 Encounters:   10/13/22 63 kg (139 lb)   10/06/22 62.1 kg (137 lb)   10/04/22 62.1 kg (136 lb 14.5 oz)   09/29/22 61.5 kg (135 lb 9.6 oz)   09/29/22 61.9 kg (136 lb 8 oz)   09/27/22 61.7 kg (136 lb)   09/23/22 61.7 kg (136 lb)   09/19/22 60.6 kg (133 lb 9.6 oz)   09/12/22 60 kg (132 lb 4.4 oz)   09/09/22 60.5 kg (133 lb 6.4 oz)   09/07/22 60.4 kg (133 lb 1.6 oz)   09/01/22 60.9 kg (134 lb 4.2 oz)   08/26/22 62.2 kg (137 lb 1.6 oz)   08/22/22 60.1 kg (132 lb 9.6 oz)   08/22/22 59.8 kg (131 lb 13.4 oz)   08/19/22 62.6 kg (138 lb)   08/12/22 62 kg (136 lb 11.2 oz)   08/05/22 61.7 kg (136 lb)   08/03/22 62.7 kg (138 lb 3.7 oz)   08/01/22 62.7 kg (138 lb 3.7 oz)       Last Labs:  Last Labs:  Glucose   Date Value Ref Range Status   10/05/2022 271 (H) 70 - 110 mg/dL Final   09/23/2022 158 (H) 70 - 110 mg/dL Final     BUN   Date Value Ref Range Status   10/05/2022 19 8 - 23 mg/dL Final   09/23/2022 15 8 - 23 mg/dL Final     Creatinine   Date Value Ref Range Status   10/05/2022 0.8 0.5 - 1.4 mg/dL Final   09/23/2022 0.7 0.5 - 1.4 mg/dL Final     Sodium   Date Value Ref Range Status   10/05/2022 137 136 - 145 mmol/L Final   09/23/2022 135 (L) 136 - 145 mmol/L Final     Potassium   Date Value Ref Range Status   10/05/2022 4.0 3.5 - 5.1 mmol/L Final   09/23/2022 4.1 3.5 - 5.1 mmol/L Final     Phosphorus   Date Value Ref Range Status   09/06/2022 1.1 (L) 2.7 - 4.5 mg/dL Final   09/05/2022 1.2 (L) 2.7 - 4.5 mg/dL Final     Calcium    Date Value Ref Range Status   10/05/2022 9.4 8.7 - 10.5 mg/dL Final   09/23/2022 8.8 8.7 - 10.5 mg/dL Final     Prealbumin   Date Value Ref Range Status   09/03/2022 19 (L) 20.0 - 43.0 mg/dL Final     Total Protein   Date Value Ref Range Status   10/05/2022 7.1 6.0 - 8.4 g/dL Final   09/23/2022 6.6 6.0 - 8.4 g/dL Final     Cholesterol   Date Value Ref Range Status   02/14/2022 144 120 - 199 mg/dL Final     Comment:     The National Cholesterol Education Program (NCEP) has set the  following guidelines (reference ranges) for Cholesterol:  Optimal.....................<200 mg/dL  Borderline High.............200-239 mg/dL  High........................> or = 240 mg/dL     11/18/2020 174 120 - 199 mg/dL Final     Comment:     The National Cholesterol Education Program (NCEP) has set the  following guidelines (reference ranges) for Cholesterol:  Optimal.....................<200 mg/dL  Borderline High.............200-239 mg/dL  High........................> or = 240 mg/dL       Hemoglobin A1C   Date Value Ref Range Status   05/15/2022 6.6 (H) 4.0 - 5.6 % Final     Comment:     ADA Screening Guidelines:  5.7-6.4%  Consistent with prediabetes  >or=6.5%  Consistent with diabetes    High levels of fetal hemoglobin interfere with the HbA1C  assay. Heterozygous hemoglobin variants (HbS, HgC, etc)do  not significantly interfere with this assay.   However, presence of multiple variants may affect accuracy.     02/14/2022 5.4 4.0 - 5.6 % Final     Comment:     ADA Screening Guidelines:  5.7-6.4%  Consistent with prediabetes  >or=6.5%  Consistent with diabetes    High levels of fetal hemoglobin interfere with the HbA1C  assay. Heterozygous hemoglobin variants (HbS, HgC, etc)do  not significantly interfere with this assay.   However, presence of multiple variants may affect accuracy.       Hemoglobin   Date Value Ref Range Status   10/05/2022 10.6 (L) 14.0 - 18.0 g/dL Final   09/23/2022 9.8 (L) 14.0 - 18.0 g/dL Final     Hematocrit    Date Value Ref Range Status   10/05/2022 32.9 (L) 40.0 - 54.0 % Final   09/23/2022 31.1 (L) 40.0 - 54.0 % Final     Iron   Date Value Ref Range Status   09/04/2022 58 45 - 160 ug/dL Final   06/09/2022 23 (L) 45 - 160 ug/dL Final     No components found for: FROLATE  Vit D, 25-Hydroxy   Date Value Ref Range Status   02/14/2022 37 30 - 96 ng/mL Final     Comment:     Vitamin D deficiency.........<10 ng/mL                              Vitamin D insufficiency......10-29 ng/mL       Vitamin D sufficiency........> or equal to 30 ng/mL  Vitamin D toxicity............>100 ng/mL     11/18/2020 38 30 - 96 ng/mL Final     Comment:     Vitamin D deficiency.........<10 ng/mL                              Vitamin D insufficiency......10-29 ng/mL       Vitamin D sufficiency........> or equal to 30 ng/mL  Vitamin D toxicity............>100 ng/mL       WBC   Date Value Ref Range Status   10/05/2022 7.15 3.90 - 12.70 K/uL Final   09/23/2022 5.41 3.90 - 12.70 K/uL Final       Assessment:     Nutrition/Diet History     Patient Reported Diet/Restrictions/Preferences: NPO  Food Allergies: NKFA  Factors Affecting Nutritional Intake: Base of Tongue Cancer    Estimated/Assessed Needs     Weight Used For Calorie Calculations: 62 kg (137 lb)  Energy Calorie Requirements (kcal): 2577-7350 kcal/day   Energy Need Method: 30-35 Kcal/kg  Protein Requirements:  g/day   Protein Need Method: 1.5-2.0 g/kg  Fluid Requirements: 2000 ml/day  Estimated Fluid Requirement Method: 1ml/kcal      Nutrition Support  Current EN: Jevity 1.5- 6 cartons per day with 60ml FWF before and after each bolus. Provides 2130 calories, 90g protein and 1800ml FW. Recommend extra 60ml FWF TID to meet estimated fluid needs.    NEW EN RECOMMENDATIONS: Dena [employer]ms 1.4- 5 cartons per day (1625ml total). Bolus 1 carton (325ml) 6x/d with 60ml FWF before and after each bolus. TF and FWF provides 2275 calories, 100g protein and 1770ml FW. Recommend extra 60ml FWF QID to meet  estimated fluid needs. Recommended Prostat- 1 oz daily via peg to provide extra 15g protein to meet higher range of protein needs daily.     Evaluation of Received Nutrient/Fluid Intake     Calorie Intake: meeting needs  Protein Intake: meeting needs  Fluid Intake: meeting needs  Tolerance: tolerating  % Intake of Estimated Energy Needs: 100 % via peg      Nutrition Diagnosis Related to (Etiology) As Evidenced By (Signs/Symptoms)   Inadequate protein intake Physiological causes increasing nutrient needs BoT cancer, scheduled surgery, Estimated intake of protein insufficient to meet requirements     RD Notes  Mr. Cyrus Batres is a 73y/o male with 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. Pt is schedule for glossectomy surgery for his base of tongue cancer in 1 month. He is currently NPO and recently switch to Jevity 1.5 and is tolerating 6 cartons per day without associated N/V/D, gas or bloating. He does have chronic diarrhea not associated with feeding times. He present today with questions regarding how he can increase his nutrition to prepare for surgery next month. He reports he would like to switch to Premise to promote regular BMs and help control BG. He reports recently weight gain of 7-8# since increasing his TF to 6 cartons per day. CW: 137#    Nutrition Intervention:      Nutrition Intervention Enteral Nutrition  Composition   Goals/Expected Outcomes Initiate trial of Dena [employer] 1.4- 5 cartons per day to meet estimated nutritional needs and promote regular BMs   Progress Initial     Nutrition Intervention Medical food supplement: Modified food   Goals/Expected Outcomes Initiate Prostat 1oz- once daily to meet higher end of estimated protein range to prepare for upcoming surgery and promote healing after surgery   Progress Initial     Plan  Provided Banatrol samples. Take BID via peg as needed to improve BMs  Provided Prostat samples. Recommended once daily to increase  protein intake to prepare for surgery  Provided samples of Guide 1.4 to test tolerance before switching formulas. Pt to contact RD next week to initiate switch if desired  Answered pt questions to the best of my ability and to the patient's satisfaction  Provided RD contact info. Encouraged pt to contact RD with questions or concerns    Monitoring/Evaluation:     Monitor: TF tolerance, weight, BMs    Next Visit: f/u as needed      I have explained and the patient understands all of  the current recommendation(s). I have answered all of their questions to the best of my ability and to their complete satisfaction.   The patient is to continue with the current management plan.    Electronically signed by: Barbara Zayas MBA, LA, MALLORY    Answers submitted by the patient for this visit:  Review of Systems Questionnaire (Submitted on 10/10/2022)  appetite change : No  unexpected weight change: Yes  mouth sores: No  visual disturbance: Yes  cough: No  shortness of breath: Yes  chest pain: No  abdominal pain: No  diarrhea: Yes  frequency: No  back pain: No  rash: No  headaches: No  adenopathy: No  nervous/ anxious: No

## 2022-10-14 NOTE — TELEPHONE ENCOUNTER
Spoke to Janel, wife, who wanted to know if it would be ok for her and patient to have sexual intercourse as she is concerned about being exposed to previous chemo. Message reviewed with Briana who states that it is fine for them to have sexual intercourse. Spoke to wife, made aware, verbalized understanding.

## 2022-10-14 NOTE — TELEPHONE ENCOUNTER
----- Message from Janel Webber sent at 10/14/2022 11:27 AM CDT -----  Janel the patient's wife called and said she would like to talk to Dr. GUADALUPE about the patient and herself. She said it is some personal questions that she wants to ask him. # [phone]

## 2022-10-16 ENCOUNTER — PATIENT MESSAGE (OUTPATIENT)
Dept: HEMATOLOGY/ONCOLOGY | Facility: CLINIC | Age: 72
End: 2022-10-16

## 2022-10-23 ENCOUNTER — PATIENT MESSAGE (OUTPATIENT)
Dept: HEMATOLOGY/ONCOLOGY | Facility: CLINIC | Age: 72
End: 2022-10-23

## 2022-10-24 ENCOUNTER — TELEPHONE (OUTPATIENT)
Dept: HEMATOLOGY/ONCOLOGY | Facility: CLINIC | Age: 72
End: 2022-10-24

## 2022-10-24 DIAGNOSIS — C32.9 LARYNX NEOPLASM MALIGNANT: Primary | ICD-10-CM

## 2022-10-24 DIAGNOSIS — Z78.9 ON ENTERAL NUTRITION: ICD-10-CM

## 2022-10-24 DIAGNOSIS — R19.7 DIARRHEA, UNSPECIFIED TYPE: ICD-10-CM

## 2022-10-24 RX ORDER — SODIUM CHLORIDE 0.9 % (FLUSH) 0.9 %
10 SYRINGE (ML) INJECTION
Status: CANCELLED | OUTPATIENT
Start: 2022-10-24

## 2022-10-24 RX ORDER — HEPARIN 100 UNIT/ML
500 SYRINGE INTRAVENOUS
Status: CANCELLED | OUTPATIENT
Start: 2022-10-24

## 2022-10-24 NOTE — PROGRESS NOTES
Orders placed for port flushes per patient request and Dr. Tamez's verbal ok to do so. Infusion made aware of need to  Rocio made aware of need for auth.

## 2022-10-25 NOTE — TELEPHONE ENCOUNTER
Per Lachelle's recommendation and Dr. Tamez's verbal orders, I attempted to place orders for Banatrol for patient's diarrhea. I spoke to multiple pharmacies and DME companies to see if they could accept prescription, unfortunately, this is an OTC supplement and these companies do not carry to dispense. Lachelle made aware and states that she will look into getting patient coupons and will direct them to website where they can order supplement from. Dr. Tamez made aware.

## 2022-10-26 ENCOUNTER — INFUSION (OUTPATIENT)
Dept: INFUSION THERAPY | Facility: HOSPITAL | Age: 72
End: 2022-10-26
Attending: INTERNAL MEDICINE
Payer: MEDICARE

## 2022-10-26 ENCOUNTER — OUTPATIENT CASE MANAGEMENT (OUTPATIENT)
Dept: ADMINISTRATIVE | Facility: OTHER | Age: 72
End: 2022-10-26
Payer: MEDICARE

## 2022-10-26 VITALS
HEIGHT: 66 IN | HEART RATE: 83 BPM | RESPIRATION RATE: 18 BRPM | DIASTOLIC BLOOD PRESSURE: 62 MMHG | WEIGHT: 140.81 LBS | SYSTOLIC BLOOD PRESSURE: 105 MMHG | BODY MASS INDEX: 22.63 KG/M2 | OXYGEN SATURATION: 100 % | TEMPERATURE: 98 F

## 2022-10-26 DIAGNOSIS — E86.0 DEHYDRATION: ICD-10-CM

## 2022-10-26 DIAGNOSIS — C01 PRIMARY CANCER OF BASE OF TONGUE: Primary | ICD-10-CM

## 2022-10-26 DIAGNOSIS — C32.9 LARYNX CANCER: ICD-10-CM

## 2022-10-26 PROCEDURE — 63600175 PHARM REV CODE 636 W HCPCS: Performed by: INTERNAL MEDICINE

## 2022-10-26 PROCEDURE — 25000003 PHARM REV CODE 250: Performed by: INTERNAL MEDICINE

## 2022-10-26 PROCEDURE — 96523 IRRIG DRUG DELIVERY DEVICE: CPT

## 2022-10-26 PROCEDURE — A4216 STERILE WATER/SALINE, 10 ML: HCPCS | Performed by: INTERNAL MEDICINE

## 2022-10-26 RX ORDER — SODIUM CHLORIDE 0.9 % (FLUSH) 0.9 %
10 SYRINGE (ML) INJECTION
Status: DISCONTINUED | OUTPATIENT
Start: 2022-10-26 | End: 2022-10-26 | Stop reason: HOSPADM

## 2022-10-26 RX ORDER — SODIUM CHLORIDE 0.9 % (FLUSH) 0.9 %
10 SYRINGE (ML) INJECTION
Status: CANCELLED | OUTPATIENT
Start: 2022-10-26

## 2022-10-26 RX ORDER — HEPARIN 100 UNIT/ML
500 SYRINGE INTRAVENOUS
Status: DISCONTINUED | OUTPATIENT
Start: 2022-10-26 | End: 2022-10-26 | Stop reason: HOSPADM

## 2022-10-26 RX ORDER — HEPARIN 100 UNIT/ML
500 SYRINGE INTRAVENOUS
Status: CANCELLED | OUTPATIENT
Start: 2022-10-26

## 2022-10-26 RX ADMIN — HEPARIN 500 UNITS: 100 SYRINGE at 10:10

## 2022-10-26 RX ADMIN — SODIUM CHLORIDE, PRESERVATIVE FREE 10 ML: 5 INJECTION INTRAVENOUS at 10:10

## 2022-10-26 NOTE — PROGRESS NOTES
Mr Batres is doing good except for diarrhea. He is taking banatrol. They ordered the banatrol from [employer]. It was $38.00 He is having to take imodium every third day. Insurance does not pay for banatrol per wife because it is over the counter medication. Mr. Batres has been out pressuring washing the house and drive way. He mowed the yard. He is taking breaks. He has not had chemo in two months. He is having no complaints of pain.   BARRIERS   : knowledge deficit of upcoming surgery pharyngectomy  INTERVENTIONS   :   Instructed wife to ask for home health when he is discharged home from surgery, verbalized understanding.  Wife feels like there will always be something that they should of asked about the procedure. She feels like they did explain the procedure. He will be in the hospital around two weeks if no complications. Reviewed when to call the provider- he denies headache, fever, N?V, skin breakdown or sore throat. Oncologist and dietician are working with him about his diarrhea.            MEMBER ACTION PLAN   :  Pharyngectomy on 11/09/22. Possible immunotherapy after surgery.   CM ACTION PLAN   : Called [facility] Austin . [phone] and left a message at 4:23 pm on 10/26/22  Eugenia Agudelo RN        Outpatient Care Management  Plan of Care Follow Up Visit    Patient: Cyrus Batres Jr.  MRN: [id number]  Date of Service: 10/26/2022  Completed by: Eugenia Agudelo RN  Referral Date: 09/02/2022    Reason for Visit   Patient presents with    Update Plan Of Care       Brief Summary: Mr Batres is doing good except for diarrhea. He is taking banatrol. They ordered the banatrol from [employer]. It was $38.00 He is having to take imodium every third day. Insurance does not pay for banatrol per wife because it is over the counter medication. Mr. Batres has been out pressuring washing the house and drive way. He mowed the yard. He is taking breaks. He has not had chemo in two months. He is having no complaints of  pain.   Pharyngectomy on 11/09/22.  CM ACTION PLAN   : Called [facility] Austin Rep. [phone] and left a message at 4:23 pm on 10/26/22    Patient Summary     Involvement of Care:  Do I have permission to speak with other family members about your care?       Patient Reported Labs & Vitals:  1.  Any Patient Reported Labs & Vitals?   no  2.  Patient Reported Blood Pressure:   n/a  3.  Patient Reported Pulse:   n/a  4.  Patient Reported Weight (Kg):   n/a  5.  Patient Reported Blood Glucose (mg/dl):   n/a    Medical and social history was reviewed with patient and/or caregiver.     Clinical Assessment     Reviewed and provided basic information on available community resources for mental health, transportation, wellness resources, and palliative care programs with patient and/or caregiver.     Complex Care Plan     Care plan was discussed and completed today with input from patient and/or caregiver.    Patient Instructions     Instructions were provided via the [facility] patient [employer] and are available for the patient to view on the patient portal.      Follow up in about 16 days (around 11/11/2022) for RN Follow up call.    Todays OPCM Self-Management Care Plan was developed with the patients/caregivers input and was based on identified barriers from todays assessment.  Goals were written today with the patient/caregiver and the patient has agreed to work towards these goals to improve his/her overall well-being. Patient verbalized understanding of the care plan, goals, and all of today's instructions. Encouraged patient/caregiver to communicate with his/her physician and health care team about health conditions and the treatment plan.  Provided my contact information today and encouraged patient/caregiver to call me with any questions as needed.

## 2022-10-26 NOTE — PLAN OF CARE
Problem: Infection  Goal: Absence of Infection Signs and Symptoms  Outcome: Ongoing, Progressing  Intervention: Prevent or Manage Infection  Flowsheets (Taken 10/26/2022 1011)  Infection Management: aseptic technique maintained  Isolation Precautions: precautions maintained

## 2022-10-31 ENCOUNTER — OUTPATIENT CASE MANAGEMENT (OUTPATIENT)
Dept: ADMINISTRATIVE | Facility: OTHER | Age: 72
End: 2022-10-31
Payer: MEDICARE

## 2022-10-31 NOTE — PROGRESS NOTES
10/31/22-Called Kely Austin . [phone] and left a message at 9:40 am  on 10/31/22 asking if insurance will pay for Banatrol for diarrhea.   11/03/22- Received message back from Jose Francis and he said  call [phone]. Called [phone] and was told this was the wrong number. She transferred me to another phone number. Ref #[id number]. Called [phone]  and spoke to Lillian who looked at patient's plan and said medication may be covered with restrictions. She looked up Banatrol plus oral powder packets and Banatrol TS feed liquid in packets. Provider office would need to call [phone] for prior auth and they would need clinic notes of why it needs to be covered under Part D of his insurance.Ref# [id number].  Message to Dr. Tamez's office.

## 2022-11-01 ENCOUNTER — HOSPITAL ENCOUNTER (EMERGENCY)
Facility: HOSPITAL | Age: 72
Discharge: HOME | End: 2022-11-01
Attending: STUDENT IN AN ORGANIZED HEALTH CARE EDUCATION/TRAINING PROGRAM
Payer: MEDICARE

## 2022-11-01 VITALS
RESPIRATION RATE: 18 BRPM | DIASTOLIC BLOOD PRESSURE: 65 MMHG | HEIGHT: 66 IN | SYSTOLIC BLOOD PRESSURE: 116 MMHG | OXYGEN SATURATION: 99 % | HEART RATE: 75 BPM | TEMPERATURE: 98 F | WEIGHT: 139 LBS | BODY MASS INDEX: 22.34 KG/M2

## 2022-11-01 DIAGNOSIS — R11.0 NAUSEA: ICD-10-CM

## 2022-11-01 DIAGNOSIS — R42 DIZZINESS: ICD-10-CM

## 2022-11-01 DIAGNOSIS — E86.0 DEHYDRATION: Primary | ICD-10-CM

## 2022-11-01 LAB
ALBUMIN SERPL BCP-MCNC: 4.3 G/DL (ref 3.5–5.2)
ALP SERPL-CCNC: 87 U/L (ref 55–135)
ALT SERPL W/O P-5'-P-CCNC: 16 U/L (ref 10–44)
ANION GAP SERPL CALC-SCNC: 8 MMOL/L (ref 8–16)
AST SERPL-CCNC: 19 U/L (ref 10–40)
BASOPHILS # BLD AUTO: 0.03 K/UL (ref 0–0.2)
BASOPHILS NFR BLD: 0.3 % (ref 0–1.9)
BILIRUB SERPL-MCNC: 0.6 MG/DL (ref 0.1–1)
BUN SERPL-MCNC: 25 MG/DL (ref 8–23)
CALCIUM SERPL-MCNC: 8.9 MG/DL (ref 8.7–10.5)
CHLORIDE SERPL-SCNC: 99 MMOL/L (ref 95–110)
CO2 SERPL-SCNC: 27 MMOL/L (ref 23–29)
CREAT SERPL-MCNC: 0.8 MG/DL (ref 0.5–1.4)
DIFFERENTIAL METHOD: ABNORMAL
EOSINOPHIL # BLD AUTO: 0 K/UL (ref 0–0.5)
EOSINOPHIL NFR BLD: 0.4 % (ref 0–8)
ERYTHROCYTE [DISTWIDTH] IN BLOOD BY AUTOMATED COUNT: 14 % (ref 11.5–14.5)
EST. GFR  (NO RACE VARIABLE): >60 ML/MIN/1.73 M^2
GLUCOSE SERPL-MCNC: 198 MG/DL (ref 70–110)
GLUCOSE SERPL-MCNC: 199 MG/DL (ref 70–110)
HCT VFR BLD AUTO: 31 % (ref 40–54)
HGB BLD-MCNC: 10.5 G/DL (ref 14–18)
IMM GRANULOCYTES # BLD AUTO: 0.06 K/UL (ref 0–0.04)
IMM GRANULOCYTES NFR BLD AUTO: 0.6 % (ref 0–0.5)
LYMPHOCYTES # BLD AUTO: 0.4 K/UL (ref 1–4.8)
LYMPHOCYTES NFR BLD: 4.1 % (ref 18–48)
MCH RBC QN AUTO: 32.6 PG (ref 27–31)
MCHC RBC AUTO-ENTMCNC: 33.9 G/DL (ref 32–36)
MCV RBC AUTO: 96 FL (ref 82–98)
MONOCYTES # BLD AUTO: 0.4 K/UL (ref 0.3–1)
MONOCYTES NFR BLD: 3.6 % (ref 4–15)
NEUTROPHILS # BLD AUTO: 9.9 K/UL (ref 1.8–7.7)
NEUTROPHILS NFR BLD: 91 % (ref 38–73)
NRBC BLD-RTO: 0 /100 WBC
PLATELET # BLD AUTO: 144 K/UL (ref 150–450)
PMV BLD AUTO: 11.5 FL (ref 9.2–12.9)
POTASSIUM SERPL-SCNC: 4.3 MMOL/L (ref 3.5–5.1)
PROT SERPL-MCNC: 8 G/DL (ref 6–8.4)
RBC # BLD AUTO: 3.22 M/UL (ref 4.6–6.2)
SODIUM SERPL-SCNC: 134 MMOL/L (ref 136–145)
WBC # BLD AUTO: 10.85 K/UL (ref 3.9–12.7)

## 2022-11-01 PROCEDURE — 96374 THER/PROPH/DIAG INJ IV PUSH: CPT

## 2022-11-01 PROCEDURE — 85025 COMPLETE CBC W/AUTO DIFF WBC: CPT | Performed by: STUDENT IN AN ORGANIZED HEALTH CARE EDUCATION/TRAINING PROGRAM

## 2022-11-01 PROCEDURE — 82962 GLUCOSE BLOOD TEST: CPT

## 2022-11-01 PROCEDURE — 25000003 PHARM REV CODE 250: Performed by: STUDENT IN AN ORGANIZED HEALTH CARE EDUCATION/TRAINING PROGRAM

## 2022-11-01 PROCEDURE — 93010 ELECTROCARDIOGRAM REPORT: CPT | Mod: ,,, | Performed by: INTERNAL MEDICINE

## 2022-11-01 PROCEDURE — 63600175 PHARM REV CODE 636 W HCPCS: Performed by: STUDENT IN AN ORGANIZED HEALTH CARE EDUCATION/TRAINING PROGRAM

## 2022-11-01 PROCEDURE — 93005 ELECTROCARDIOGRAM TRACING: CPT | Performed by: INTERNAL MEDICINE

## 2022-11-01 PROCEDURE — 99285 EMERGENCY DEPT VISIT HI MDM: CPT | Mod: 25

## 2022-11-01 PROCEDURE — 80053 COMPREHEN METABOLIC PANEL: CPT | Performed by: STUDENT IN AN ORGANIZED HEALTH CARE EDUCATION/TRAINING PROGRAM

## 2022-11-01 PROCEDURE — 93010 EKG 12-LEAD: ICD-10-PCS | Mod: ,,, | Performed by: INTERNAL MEDICINE

## 2022-11-01 RX ORDER — MECLIZINE HCL 12.5 MG 12.5 MG/1
25 TABLET ORAL
Status: COMPLETED | OUTPATIENT
Start: 2022-11-01 | End: 2022-11-01

## 2022-11-01 RX ORDER — MECLIZINE HYDROCHLORIDE 25 MG/1
25 TABLET ORAL 3 TIMES DAILY PRN
Qty: 20 TABLET | Refills: 0 | Status: SHIPPED | OUTPATIENT
Start: 2022-11-01 | End: 2023-01-09

## 2022-11-01 RX ORDER — ONDANSETRON 2 MG/ML
4 INJECTION INTRAMUSCULAR; INTRAVENOUS
Status: COMPLETED | OUTPATIENT
Start: 2022-11-01 | End: 2022-11-01

## 2022-11-01 RX ADMIN — SODIUM CHLORIDE 1000 ML: 0.9 INJECTION, SOLUTION INTRAVENOUS at 03:11

## 2022-11-01 RX ADMIN — ONDANSETRON 4 MG: 2 INJECTION INTRAMUSCULAR; INTRAVENOUS at 03:11

## 2022-11-01 RX ADMIN — MECLIZINE HYDROCHLORIDE 25 MG: 12.5 TABLET ORAL at 03:11

## 2022-11-01 NOTE — ED PROVIDER NOTES
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  He was sitting in his chair about take his pills he started feeling nauseous and dizzy, he took his evening medications and then when out to his wife and expressed that he was dizzy.  She reports that he had difficulty communicating for few minutes, he also was walking with a wide-based gait, so she brought him in for further evaluation.  She initially reported to triage that she thought he had an allergic reaction to the medication he was taking, and the patient is nonverbal so he did not correct her.  After talking to him, he described the symptoms above.  He also reports that the symptoms have improved.  He reports no swelling, no rash, nausea but no vomiting and ongoing dizziness.     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 OR [address] [place] [address];  Service: ENT;  Laterality: N/A;    DISSECTION OF NECK Bilateral 1/6/2022    Procedure: DISSECTION, NECK;  Surgeon: Jesse James MD;  Location: [facility] OR [address] [place] [address];  Service: ENT;  Laterality: Bilateral;    FLAP PROCEDURE Right 1/6/2022    Procedure: CREATION, FREE FLAP;  Surgeon: Alise Hart MD;   [facility];  Service: ENT;  Laterality: Right;  Ischemic start 1351  Ischemic stop 1502    INSERTION OF TUNNELED CENTRAL VENOUS CATHETER (CVC) WITH SUBCUTANEOUS PORT N/A 6/9/2022    Procedure: RGGEUGAPY-RTOY-T-CATH;  Surgeon: Jesus Viera MD;  Location: [facility];  Service: General;  Laterality: N/A;    LARYNGECTOMY N/A 1/6/2022    Procedure: LARYNGECTOMY;  Surgeon: Jesse James MD;  [facility];  Service: ENT;  Laterality: N/A;    LARYNGOSCOPY N/A 8/4/2020    Procedure: Suspension microlaryngoscopy with biopsy, possible KTP laser treatment/excision;  Surgeon: Stew Nole MD;  [facility];  Service: ENT;  Laterality: N/A;  Microscope, telescopes, tower, microinstruments, KTP laser, rep conf# [id number] IC 7/28.    LARYNGOSCOPY N/A 3/16/2021    Procedure: Suspension microlaryngoscopy with excision of lesion, possible CO2 laser;  Surgeon: Stew Noel MD;  Location: [facility] OR [address] [place] [address];  Service: ENT;  Laterality: N/A;  Microscope, telescopes, tower, microinstruments, CO2 laser, rep conf# [id number] IC 3/4.    LARYNGOSCOPY N/A 4/1/2021    Procedure: Suspension microlaryngoscopy with KTP laser excision of lesion;  Surgeon: Stew Noel MD;  [facility];  Service: ENT;  Laterality: N/A;  Microscope, telescopes, tower, microinstruments, 70 degree scope, vocal fold , KTP laser, rep conf# [id number] BC    LARYNGOSCOPY N/A 12/9/2021    Procedure: Suspension microlaryngoscopy with biopsy;  Surgeon: Stew Noel MD;  [facility];  Service: ENT;  Laterality: N/A;  Microscope, telescopes, tower, microinstruments    LARYNGOSCOPY N/A 1/6/2022    Procedure: LARYNGOSCOPY;  Surgeon: Jesse James MD;  [facility];  Service: ENT;  Laterality: N/A;    LARYNGOSCOPY N/A 4/27/2022    Procedure: LARYNGOSCOPY WITH BIOPSY;  Surgeon: Jesse James MD;  Location: [facility] OR [address] [place] [address];  Service: ENT;  Laterality:  N/A;    MICROLARYNGOSCOPY N/A 3/17/2020    Procedure: MICROLARYNGOSCOPY;  Surgeon: Jung Xiao MD;  Location: [facility] OR;  Service: ENT;  Laterality: N/A;  Laser Microlaryngoscopy  [facility] [phone]    REIMPLANTATION OF PARATHYROID TISSUE N/A 1/6/2022    Procedure: REIMPLANTATION, PARATHYROID TISSUE;  Surgeon: Jesse James MD;  Location: [facility] OR [facility];  Service: ENT;  Laterality: N/A;    THYROIDECTOMY  1/6/2022    Procedure: THYROIDECTOMY;  Surgeon: Jesse James MD;  Location: [facility] OR [facility];  Service: ENT;;    TRACHEOSTOMY N/A 12/27/2021    Procedure: CREATION, TRACHEOSTOMY;  Surgeon: Flex Espinosa MD;  [facility];  Service: ENT;  Laterality: N/A;     Family History   Problem Relation Age of Onset    Abnormal EKG Mother     Diabetes Father     Heart disease Father     Hypertension Father      Social History     Tobacco Use    Smoking status: Never    Smokeless tobacco: Never   Substance Use Topics    Alcohol use: Not Currently     Comment: occasional    Drug use: No     Review of Systems   Constitutional:  Negative for activity change and appetite change.   HENT:  Negative for congestion and drooling.    Eyes:  Negative for discharge and itching.   Respiratory:  Negative for cough and chest tightness.    Cardiovascular:  Negative for chest pain and leg swelling.   Gastrointestinal:  Negative for abdominal distention and abdominal pain.   Genitourinary:  Negative for difficulty urinating and dysuria.   Musculoskeletal:  Negative for arthralgias.   Skin:  Negative for color change and pallor.   Neurological:  Positive for dizziness. Negative for facial asymmetry.   Psychiatric/Behavioral:  Negative for agitation and behavioral problems.      Physical Exam     Initial Vitals [11/01/22 0126]   BP Pulse Resp Temp SpO2   (!) 150/79 81 18 97.4 °F (36.3 °C) 98 %      MAP       --         Physical Exam    Nursing note and vitals  reviewed.  Constitutional: He appears well-developed and well-nourished. No distress.   HENT:   Head: Normocephalic and atraumatic.   Mouth/Throat: Oropharynx is clear and moist.   Eyes: Conjunctivae and EOM are normal. Pupils are equal, round, and reactive to light.   Neck: No thyromegaly present.   Normal range of motion.  Cardiovascular:  Normal rate, regular rhythm and intact distal pulses.           Pulmonary/Chest: Breath sounds normal. No respiratory distress. He has no wheezes.   Abdominal: Abdomen is soft. Bowel sounds are normal. He exhibits no distension. There is no abdominal tenderness.   Musculoskeletal:         General: No tenderness or edema. Normal range of motion.      Cervical back: Normal range of motion.     Neurological: He is alert and oriented to person, place, and time. He has normal strength and normal reflexes. No sensory deficit. Coordination and gait normal. GCS eye subscore is 4. GCS verbal subscore is 5. GCS motor subscore is 6.   Normal finger to nose, heel to shin, rapid alternating movement.    Skin: Skin is warm and dry. No rash noted.   Psychiatric: He has a normal mood and affect. His behavior is normal. Thought content normal.       ED Course   Procedures  Labs Reviewed   CBC W/ AUTO DIFFERENTIAL - Abnormal; Notable for the following components:       Result Value    RBC 3.22 (*)     Hemoglobin 10.5 (*)     Hematocrit 31.0 (*)     MCH 32.6 (*)     Platelets 144 (*)     Immature Granulocytes 0.6 (*)     Gran # (ANC) 9.9 (*)     Immature Grans (Abs) 0.06 (*)     Lymph # 0.4 (*)     Gran % 91.0 (*)     Lymph % 4.1 (*)     Mono % 3.6 (*)     All other components within normal limits   COMPREHENSIVE METABOLIC PANEL - Abnormal; Notable for the following components:    Sodium 134 (*)     Glucose 198 (*)     BUN 25 (*)     All other components within normal limits   POCT GLUCOSE - Abnormal; Notable for the following components:    POC Glucose 199 (*)     All other components within  normal limits   POCT GLUCOSE MONITORING CONTINUOUS          Imaging Results              CT Head Without Contrast (Final result)  Result time 11/01/22 02:35:11      Final result by Daryl Campbell MD (11/01/22 02:35:11)                   Narrative:    Head CT scan without contrast    COMPARISONS: Head CT scan without contrast dated May 10, 2022    ADDITIONAL PERTINENT HISTORY: TIA    TECHNIQUE:  Multiple axial images were obtained from the skull base to the vertex without IV contrast. One of the following dose optimization techniques was utilized in the performance of this exam: Automated exposure control; adjustment of the mA and/or kV according to the patient's size; or use of an iterative  reconstruction technique.  Specific details can be referenced in the facility's radiology CT exam operational policy.    FINDINGS:    Midline shift: Negative    Ventricles:  Negative    Brain parenchyma:  Negative    Extra-axial spaces:  Mild cerebral atrophy.    Intracranial vasculature:  Cavernous internal carotid and distal vertebral artery calcifications. Otherwise negative    Osseous structures:  Negative    Paranasal sinuses and mastoid air cells:  Moderate mucosal thickening involving the maxillary sinuses.    Surrounding soft tissues and orbits:  Negative      IMPRESSION:  1. Age related changes as described above.  2. No acute intracranial pathology.  3. Underlying paranasal sinus disease.    Electronically signed by:  Daryl Campbell MD  11/1/2022 2:35 AM CDT Workstation: [id number]                                     Medications   sodium chloride 0.9% bolus 1,000 mL (1,000 mLs Intravenous New Bag 11/1/22 0333)   ondansetron injection 4 mg (4 mg Intravenous Given 11/1/22 0328)   meclizine tablet 25 mg (25 mg Per G Tube Given 11/1/22 0328)                 ED Course as of 11/01/22 0426   Tue Nov 01, 2022   0411 Comprehensive metabolic panel(!) [BS]   0411 CBC auto differential(!) [BS]   0411 POCT glucose(!) [BS]      ED  Course User Index  [BS] David Sun MD               On my exam, 72-year-old male with history of laryngectomy is well-appearing, alert and oriented interactive.  He answers questions clearly and right clearly.  His vitals are normal.  His neurological exam is notable for normal cranial nerve exam aside from his inability to talk, equal round reactive pupils without evidence of nystagmus in either direction, normal cerebellar exam including finger-to-nose heel-to-shin and rapid alternating movement.  He has a normal gait without ataxia.  CT head was ordered to rule out acute intracranial mass or bleed and was within normal limits.  Blood work notable for mild elevation in BUN with normal creatinine consistent with dehydration secondary to nausea.  Patient's symptoms improved with meclizine, IV fluid.  On reexamination, the patient is feeling well and wants to go home.  I advised that we could admit him for an MRI to ensure that he does not have any acute stroke in the posterior fossa, but he does not want further evaluation at this time. His symptoms have resolved.  He is stable for discharge with outpatient follow-up return precautions, especially for new or worsening dizziness, difficulty speaking or breathing, weakness or numbness concerning for stroke.  He is comfortable with the plan and ready to go home.    Clinical Impression:   Final diagnoses:  [R42] Dizziness  [E86.0] Dehydration (Primary)  [R11.0] Nausea        ED Disposition Condition    Discharge Stable          ED Prescriptions       Medication Sig Dispense Start Date End Date Auth. Provider    meclizine (ANTIVERT) 25 mg tablet 1 tablet (25 mg total) by Per G Tube route 3 (three) times daily as needed. 20 tablet 11/1/2022 11/8/2022 David Sun MD          Follow-up Information       Follow up With Specialties Details Why Contact Info    Maico Patterson MD Internal Medicine Call in 1 day To recheck today's symptoms, To set up a follow-up  appointment [address]  [address]  [facility] [address]  [phone]               David Sun MD  11/01/22 0425

## 2022-11-01 NOTE — DISCHARGE INSTRUCTIONS
Your labs are consistent with mild dehydration. Drink more fluids. Return if your symptoms worsen. Take meclizine 25 mg every 6 hours for dizziness. Return if you have new change in vision, worsening dizziness, inability to walk. That would be concerning for a stroke and you would need further workup at that time.     Thank you for coming to our Emergency Department today. It is important to remember that some problems or medical conditions are difficult to diagnose and may not be found during your Emergency Department visit.     Be sure to follow up with your primary care doctor and review all labs/imaging/tests that were performed during your ER visit with them. Some labs/tests may be outside of the normal range and require non-emergent follow-up and further investigation to help diagnose/exclude/prevent complications or other potentially serious medical conditions that were not addressed during your ER visit.    If you do not have a primary care doctor, you may contact the one listed on your discharge paperwork or you may also call the [facility] Appointment Desk at [phone] to schedule an appointment and establish care with one. Another resources for finding primary care physicians: [email or web address] It is important to your health that you have a primary care doctor.    Please take all medications as directed. All medications may potentially have side-effects and it is impossible to predict which medications may give you side-effects or what side-effects (if any) they will give you. If you feel that you are having a negative effect or side-effect of any medication you should immediately stop taking them and seek medical attention. If you feel that you are having a life-threatening reaction call 911.    Return to the ER with any questions/concerns, new/concerning symptoms, worsening or failure to improve.     Do not drive, swim, climb to height, take a bath, operate heavy machinery, drink alcohol or  take potentially sedating medications, sign any legal documents or make any important decisions for 24 hours if you have received any pain medications, sedatives or mood altering drugs during your ER visit or within 24 hours of taking them if they have been prescribed to you.     You can find additional resources for Dentists, hearing aids, durable medical equipment, low cost pharmacies and other resources at [email or web address]

## 2022-11-02 ENCOUNTER — PATIENT MESSAGE (OUTPATIENT)
Dept: FAMILY MEDICINE | Facility: CLINIC | Age: 72
End: 2022-11-02

## 2022-11-02 ENCOUNTER — TELEPHONE (OUTPATIENT)
Dept: FAMILY MEDICINE | Facility: CLINIC | Age: 72
End: 2022-11-02

## 2022-11-02 ENCOUNTER — PATIENT MESSAGE (OUTPATIENT)
Dept: HEMATOLOGY/ONCOLOGY | Facility: CLINIC | Age: 72
End: 2022-11-02

## 2022-11-02 ENCOUNTER — OFFICE VISIT (OUTPATIENT)
Dept: FAMILY MEDICINE | Facility: CLINIC | Age: 72
End: 2022-11-02
Payer: MEDICARE

## 2022-11-02 VITALS
RESPIRATION RATE: 18 BRPM | SYSTOLIC BLOOD PRESSURE: 104 MMHG | BODY MASS INDEX: 23.14 KG/M2 | DIASTOLIC BLOOD PRESSURE: 61 MMHG | HEART RATE: 85 BPM | OXYGEN SATURATION: 98 % | WEIGHT: 144 LBS | HEIGHT: 66 IN

## 2022-11-02 DIAGNOSIS — R73.9 ELEVATED BLOOD SUGAR: ICD-10-CM

## 2022-11-02 DIAGNOSIS — Z01.818 PREOPERATIVE CLEARANCE: Primary | ICD-10-CM

## 2022-11-02 DIAGNOSIS — E89.0 HISTORY OF TOTAL THYROIDECTOMY: ICD-10-CM

## 2022-11-02 DIAGNOSIS — Z93.0 TRACHEOSTOMY TUBE PRESENT: ICD-10-CM

## 2022-11-02 DIAGNOSIS — C80.1 ANEMIA ASSOCIATED WITH MALIGNANT NEOPLASTIC DISEASE: ICD-10-CM

## 2022-11-02 DIAGNOSIS — Z93.1 PRESENCE OF EXTERNALLY REMOVABLE PERCUTANEOUS ENDOSCOPIC GASTROSTOMY (PEG) TUBE: ICD-10-CM

## 2022-11-02 DIAGNOSIS — D63.0 ANEMIA IN NEOPLASTIC DISEASE: ICD-10-CM

## 2022-11-02 DIAGNOSIS — D63.0 ANEMIA ASSOCIATED WITH MALIGNANT NEOPLASTIC DISEASE: ICD-10-CM

## 2022-11-02 DIAGNOSIS — I10 ESSENTIAL HYPERTENSION: ICD-10-CM

## 2022-11-02 PROCEDURE — 3288F FALL RISK ASSESSMENT DOCD: CPT | Mod: CPTII,S$GLB,, | Performed by: INTERNAL MEDICINE

## 2022-11-02 PROCEDURE — 99214 OFFICE O/P EST MOD 30 MIN: CPT | Mod: S$GLB,,, | Performed by: INTERNAL MEDICINE

## 2022-11-02 PROCEDURE — 3066F PR DOCUMENTATION OF TREATMENT FOR NEPHROPATHY: ICD-10-PCS | Mod: CPTII,S$GLB,, | Performed by: INTERNAL MEDICINE

## 2022-11-02 PROCEDURE — 3288F PR FALLS RISK ASSESSMENT DOCUMENTED: ICD-10-PCS | Mod: CPTII,S$GLB,, | Performed by: INTERNAL MEDICINE

## 2022-11-02 PROCEDURE — 1160F RVW MEDS BY RX/DR IN RCRD: CPT | Mod: CPTII,S$GLB,, | Performed by: INTERNAL MEDICINE

## 2022-11-02 PROCEDURE — 3060F PR POS MICROALBUMINURIA RESULT DOCUMENTED/REVIEW: ICD-10-PCS | Mod: CPTII,S$GLB,, | Performed by: INTERNAL MEDICINE

## 2022-11-02 PROCEDURE — 4010F ACE/ARB THERAPY RXD/TAKEN: CPT | Mod: CPTII,S$GLB,, | Performed by: INTERNAL MEDICINE

## 2022-11-02 PROCEDURE — 1160F PR REVIEW ALL MEDS BY PRESCRIBER/CLIN PHARMACIST DOCUMENTED: ICD-10-PCS | Mod: CPTII,S$GLB,, | Performed by: INTERNAL MEDICINE

## 2022-11-02 PROCEDURE — 1159F MED LIST DOCD IN RCRD: CPT | Mod: CPTII,S$GLB,, | Performed by: INTERNAL MEDICINE

## 2022-11-02 PROCEDURE — 1101F PR PT FALLS ASSESS DOC 0-1 FALLS W/OUT INJ PAST YR: ICD-10-PCS | Mod: CPTII,S$GLB,, | Performed by: INTERNAL MEDICINE

## 2022-11-02 PROCEDURE — 1101F PT FALLS ASSESS-DOCD LE1/YR: CPT | Mod: CPTII,S$GLB,, | Performed by: INTERNAL MEDICINE

## 2022-11-02 PROCEDURE — 1126F PR PAIN SEVERITY QUANTIFIED, NO PAIN PRESENT: ICD-10-PCS | Mod: CPTII,S$GLB,, | Performed by: INTERNAL MEDICINE

## 2022-11-02 PROCEDURE — 3078F DIAST BP <80 MM HG: CPT | Mod: CPTII,S$GLB,, | Performed by: INTERNAL MEDICINE

## 2022-11-02 PROCEDURE — 3044F PR MOST RECENT HEMOGLOBIN A1C LEVEL <7.0%: ICD-10-PCS | Mod: CPTII,S$GLB,, | Performed by: INTERNAL MEDICINE

## 2022-11-02 PROCEDURE — 3066F NEPHROPATHY DOC TX: CPT | Mod: CPTII,S$GLB,, | Performed by: INTERNAL MEDICINE

## 2022-11-02 PROCEDURE — 3008F PR BODY MASS INDEX (BMI) DOCUMENTED: ICD-10-PCS | Mod: CPTII,S$GLB,, | Performed by: INTERNAL MEDICINE

## 2022-11-02 PROCEDURE — 1126F AMNT PAIN NOTED NONE PRSNT: CPT | Mod: CPTII,S$GLB,, | Performed by: INTERNAL MEDICINE

## 2022-11-02 PROCEDURE — 3008F BODY MASS INDEX DOCD: CPT | Mod: CPTII,S$GLB,, | Performed by: INTERNAL MEDICINE

## 2022-11-02 PROCEDURE — 3044F HG A1C LEVEL LT 7.0%: CPT | Mod: CPTII,S$GLB,, | Performed by: INTERNAL MEDICINE

## 2022-11-02 PROCEDURE — 3074F SYST BP LT 130 MM HG: CPT | Mod: CPTII,S$GLB,, | Performed by: INTERNAL MEDICINE

## 2022-11-02 PROCEDURE — 3078F PR MOST RECENT DIASTOLIC BLOOD PRESSURE < 80 MM HG: ICD-10-PCS | Mod: CPTII,S$GLB,, | Performed by: INTERNAL MEDICINE

## 2022-11-02 PROCEDURE — 3060F POS MICROALBUMINURIA REV: CPT | Mod: CPTII,S$GLB,, | Performed by: INTERNAL MEDICINE

## 2022-11-02 PROCEDURE — 4010F PR ACE/ARB THEARPY RXD/TAKEN: ICD-10-PCS | Mod: CPTII,S$GLB,, | Performed by: INTERNAL MEDICINE

## 2022-11-02 PROCEDURE — 1159F PR MEDICATION LIST DOCUMENTED IN MEDICAL RECORD: ICD-10-PCS | Mod: CPTII,S$GLB,, | Performed by: INTERNAL MEDICINE

## 2022-11-02 PROCEDURE — 99214 PR OFFICE/OUTPT VISIT, EST, LEVL IV, 30-39 MIN: ICD-10-PCS | Mod: S$GLB,,, | Performed by: INTERNAL MEDICINE

## 2022-11-02 PROCEDURE — 3074F PR MOST RECENT SYSTOLIC BLOOD PRESSURE < 130 MM HG: ICD-10-PCS | Mod: CPTII,S$GLB,, | Performed by: INTERNAL MEDICINE

## 2022-11-02 NOTE — PROGRESS NOTES
Subjective:       Patient ID: Cyrus Batres Jr. is a 72 y.o. male.    Chief Complaint: Pre-op Exam, Hypertension, Thyroid Problem, and Gastroesophageal Reflux    Mr. Nicolás Batres is unfortunate 72-year-old gentleman who comes for follow-up.  He has recently been diagnosed with a tongue cancer and he has elected to go through radical removal of the tongue which will be major procedure.  This is scheduled on 11/09 22 by Dr. James.  This will be done at [facility].    Medical clearance has been sought.    The details of this procedure and if any significant blood loss may be involved is unknown to me.  I assume no major vascular structures would be hampered or touched except what is pertinent to head and neck area.    His blood pressures are doing good and in fact are on the low side.    No active anginal pains.  No significant new shortness of breath.  No major bleeding issues.    I have checked his recent labs and chemistry and they were unremarkable.  His sodium level has come down a little bit.    His blood sugars continue to be elevated and he is on metformin.    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  2. FEEDING VIA PEG TUBE WHICH IS STABLE AT THIS POINT.  3.-INSOMNIA WITH CURRENT STABLE DEPENDENCY ON AMBIEN 5 MG  4.-HISTORY OF DIARRHEA FOR WHICH HE TAKES OPIUM TINCTURE.  5.-YESTERDAY'S VISIT TO THE EMERGENCY DEPARTMENT FOR DIZZINESS AND VERTIGO SYMPTOMS WHICH RESOLVED WITH MECLIZINE.  6.-RECENTLY NOTED ELEVATED BLOOD SUGARS.  CURRENTLY ON METFORMIN.  POSSIBLY SECONDARY TO DIET.        Hypertension  This is a chronic problem. The current episode started more than 1 year ago. The problem is controlled. Associated symptoms include malaise/fatigue. Pertinent negatives include no chest pain, headaches, neck pain or palpitations. Risk factors for coronary artery disease include male gender. Past treatments  include angiotensin blockers. The current treatment provides significant improvement. Compliance problems include psychosocial issues.  There is no history of kidney disease, CAD/MI or heart failure. Identifiable causes of hypertension include a thyroid problem.   Thyroid Problem  Presents for follow-up visit. Symptoms include diarrhea, fatigue and weight loss. Patient reports no constipation or palpitations. The symptoms have been stable. There is no history of heart failure.   Gastroesophageal Reflux  He complains of heartburn. He reports no chest pain or no wheezing. Associated symptoms include fatigue and weight loss. He has tried a PPI for the symptoms. The treatment provided moderate relief.     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    Postoperative hypothyroidism 7/7/2022     Social History     Socioeconomic History    Marital status:      Spouse name: Janel Batres    Number of children: 2   Occupational History    Occupation: [employer] and [employer]     Employer: [employer]   Tobacco Use    Smoking status: Never    Smokeless tobacco: Never   Substance and Sexual Activity    Alcohol use: Not Currently     Comment: occasional    Drug use: No    Sexual activity: Yes     Partners: Female   Social History Narrative    2 children from his 1st wife     Past Surgical History:   Procedure Laterality Date    DIRECT LARYNGOBRONCHOSCOPY N/A 12/27/2021    Procedure: LARYNGOSCOPY, DIRECT, WITH BRONCHOSCOPY;  Surgeon: Flex Espinosa MD;  Location: [facility] OR [address] [place] [address];  Service: ENT;  Laterality: N/A;    DISSECTION OF NECK Bilateral 1/6/2022    Procedure: DISSECTION, NECK;  Surgeon: Jesse James MD;  Location: [facility] OR [address] [place] [address];  Service: ENT;  Laterality: Bilateral;    FLAP PROCEDURE Right 1/6/2022    Procedure: CREATION,  FREE FLAP;  Surgeon: Alise Hart MD;  [facility];  Service: ENT;  Laterality: Right;  Ischemic start 1351  Ischemic stop 1502    INSERTION OF TUNNELED CENTRAL VENOUS CATHETER (CVC) WITH SUBCUTANEOUS PORT N/A 6/9/2022    Procedure: [id number];  Surgeon: Jesus Viera MD;  Location: [facility] OR;  Service: General;  Laterality: N/A;    LARYNGECTOMY N/A 1/6/2022    Procedure: LARYNGECTOMY;  Surgeon: Jesse James MD;  [facility];  Service: ENT;  Laterality: N/A;    LARYNGOSCOPY N/A 8/4/2020    Procedure: Suspension microlaryngoscopy with biopsy, possible KTP laser treatment/excision;  Surgeon: Stew Noel MD;  [facility];  Service: ENT;  Laterality: N/A;  Microscope, telescopes, tower, microinstruments, KTP laser, rep conf# [id number] IC 7/28.    LARYNGOSCOPY N/A 3/16/2021    Procedure: Suspension microlaryngoscopy with excision of lesion, possible CO2 laser;  Surgeon: Stew Noel MD;  [facility];  Service: ENT;  Laterality: N/A;  Microscope, telescopes, tower, microinstruments, CO2 laser, rep conf# [id number] IC 3/4.    LARYNGOSCOPY N/A 4/1/2021    Procedure: Suspension microlaryngoscopy with KTP laser excision of lesion;  Surgeon: Stew Noel MD;  [facility];  Service: ENT;  Laterality: N/A;  Microscope, telescopes, tower, microinstruments, 70 degree scope, vocal fold , KTP laser, rep conf# [id number] BC    LARYNGOSCOPY N/A 12/9/2021    Procedure: Suspension microlaryngoscopy with biopsy;  Surgeon: Stew Noel MD;  [facility];  Service: ENT;  Laterality: N/A;  Microscope, telescopes, tower, microinstruments    LARYNGOSCOPY N/A 1/6/2022    Procedure: LARYNGOSCOPY;  Surgeon: Jesse James MD;  [facility];  Service: ENT;  Laterality: N/A;    LARYNGOSCOPY N/A 4/27/2022    Procedure: LARYNGOSCOPY WITH BIOPSY;  Surgeon: Jesse James MD;  Location: [facility]  "OR 2ND FLR;  Service: ENT;  Laterality: N/A;    MICROLARYNGOSCOPY N/A 3/17/2020    Procedure: MICROLARYNGOSCOPY;  Surgeon: Jung Xiao MD;  Location: [facility] OR;  Service: ENT;  Laterality: N/A;  Laser Microlaryngoscopy  NEED TO SCHEDULE LASER from [employer] [id number]    REIMPLANTATION OF PARATHYROID TISSUE N/A 1/6/2022    Procedure: REIMPLANTATION, PARATHYROID TISSUE;  Surgeon: Jesse James MD;  Location: [facility] OR 2ND FLR;  Service: ENT;  Laterality: N/A;    THYROIDECTOMY  1/6/2022    Procedure: THYROIDECTOMY;  Surgeon: Jesse James MD;  Location: [facility] OR [facility] FLR;  Service: ENT;;    TRACHEOSTOMY N/A 12/27/2021    Procedure: CREATION, TRACHEOSTOMY;  Surgeon: Flex Espinosa MD;  Location: [facility] OR [facility] FLR;  Service: ENT;  Laterality: N/A;     Family History   Problem Relation Age of Onset    Abnormal EKG Mother     Diabetes Father     Heart disease Father     Hypertension Father        Review of Systems   Constitutional:  Positive for fatigue, malaise/fatigue and weight loss. Negative for activity change, chills and unexpected weight change.   HENT:  Positive for trouble swallowing. Negative for hearing loss and rhinorrhea.    Eyes:  Positive for visual disturbance. Negative for discharge.   Respiratory:  Negative for chest tightness and wheezing.    Cardiovascular:  Negative for chest pain and palpitations.   Gastrointestinal:  Positive for diarrhea and heartburn. Negative for blood in stool, constipation and vomiting.   Endocrine: Negative for polydipsia and polyuria.        Diabetes mellitus   Genitourinary:  Negative for difficulty urinating, hematuria and urgency.   Musculoskeletal:  Negative for arthralgias, joint swelling and neck pain.   Neurological:  Negative for weakness and headaches.   Psychiatric/Behavioral:  Negative for confusion and dysphoric mood.        Objective:      Blood pressure 104/61, pulse 85, resp. rate 18, height 5' 6" (1.676 m), weight 65.3 kg (144 lb), SpO2 98 %. " Body mass index is 23.24 kg/m².  Physical Exam  Constitutional:       General: He is not in acute distress.     Appearance: He is ill-appearing. He is not diaphoretic.      Comments: BMI is 23.24-somewhat chronically ill and emaciated   HENT:      Head: Normocephalic.        Comments: Tracheostomy tube noted in the neck.  Fitting well.  No infection.  Eyes:      General: No scleral icterus.  Neck:      Vascular: No carotid bruit.   Cardiovascular:      Rate and Rhythm: Normal rate and regular rhythm.   Pulmonary:      Effort: Pulmonary effort is normal. No respiratory distress.      Breath sounds: Normal breath sounds. No wheezing or rhonchi.   Abdominal:      General: There is no distension.      Palpations: Abdomen is soft. There is no mass.      Tenderness: There is no abdominal tenderness.      Comments: Peg tube noted.   Musculoskeletal:         General: No swelling or tenderness.      Cervical back: No rigidity or tenderness.      Right lower leg: No edema.      Left lower leg: No edema.   Skin:     Coloration: Skin is not jaundiced or pale.      Findings: No erythema or lesion.   Neurological:      Mental Status: He is alert. Mental status is at baseline.   Psychiatric:         Behavior: Behavior normal.         Assessment:       1. Preoperative clearance    2. Essential hypertension    3. Elevated blood sugar    4. Anemia in neoplastic disease    5. History of total thyroidectomy    6. Presence of externally removable percutaneous endoscopic gastrostomy (PEG) tube    7. Tracheostomy tube present    8. Anemia associated with malignant neoplastic disease             Normal sinus rhythm with sinus arrhythmia   Normal ECG   When compared with ECG of 01-SEP-2022 16:56,   T wave inversion now evident in Inferior leads   Nonspecific T wave abnormality no longer evident in Lateral leads   Confirmed by Mina Carlton MD ([id number]) on 11/2/2022 3:35:22 PM     Referred By: KATARZYNA    SELF           Confirmed By:Mina  Bianka DONOVAN      Specimen Collected: 11/01/22 02:19 Last Resulted: 11/02/22 15:35           Component      Latest Ref Rng & Units 11/1/2022   WBC      3.90 - 12.70 K/uL 10.85   RBC      4.60 - 6.20 M/uL 3.22 (L)   Hemoglobin      14.0 - 18.0 g/dL 10.5 (L)   Hematocrit      40.0 - 54.0 % 31.0 (L)   MCV      82 - 98 fL 96   MCH      27.0 - 31.0 pg 32.6 (H)   MCHC      32.0 - 36.0 g/dL 33.9   RDW      11.5 - 14.5 % 14.0   Platelets      150 - 450 K/uL 144 (L)   MPV      9.2 - 12.9 fL 11.5   Immature Granulocytes      0.0 - 0.5 % 0.6 (H)   Gran # (ANC)      1.8 - 7.7 K/uL 9.9 (H)   Immature Grans (Abs)      0.00 - 0.04 K/uL 0.06 (H)   Lymph #      1.0 - 4.8 K/uL 0.4 (L)   Mono #      0.3 - 1.0 K/uL 0.4   Eos #      0.0 - 0.5 K/uL 0.0   Baso #      0.00 - 0.20 K/uL 0.03   nRBC      0 /100 WBC 0   Gran %      38.0 - 73.0 % 91.0 (H)   Lymph %      18.0 - 48.0 % 4.1 (L)   Mono %      4.0 - 15.0 % 3.6 (L)   Eosinophil %      0.0 - 8.0 % 0.4   Basophil %      0.0 - 1.9 % 0.3   Differential Method       Automated   Sodium      136 - 145 mmol/L 134 (L)   Potassium      3.5 - 5.1 mmol/L 4.3   Chloride      95 - 110 mmol/L 99   CO2      23 - 29 mmol/L 27   Glucose      70 - 110 mg/dL 198 (H)   BUN      8 - 23 mg/dL 25 (H)   Creatinine      0.5 - 1.4 mg/dL 0.8   Calcium      8.7 - 10.5 mg/dL 8.9   PROTEIN TOTAL      6.0 - 8.4 g/dL 8.0   Albumin      3.5 - 5.2 g/dL 4.3   BILIRUBIN TOTAL      0.1 - 1.0 mg/dL 0.6   Alkaline Phosphatase      55 - 135 U/L 87   AST      10 - 40 U/L 19   ALT      10 - 44 U/L 16   Anion Gap      8 - 16 mmol/L 8   eGFR      >60 mL/min/1.73 m:2 >60.0     FINDINGS:  PA and lateral chest compared with 7/26/2022 show normal cardiomediastinal silhouette. Left subclavian port catheter unchanged.     Lungs are clear. Pulmonary vasculature is normal. No acute osseous abnormality. Mild convex right thoracic spine curvature unchanged. Surgical clips again noted throughout the neck.     IMPRESSION:  No acute  cardiopulmonary abnormality.     Electronically signed by:  Nael Hui MD  9/1/2022 5:57 PM CDT Workstation: [id number]           Specimen Collected: 09/01/22 17:32 Last Resulted: 09/01/22 17:57             Plan:           Preoperative clearance    Essential hypertension    Elevated blood sugar    Anemia in neoplastic disease    History of total thyroidectomy    Presence of externally removable percutaneous endoscopic gastrostomy (PEG) tube    Tracheostomy tube present    Anemia associated with malignant neoplastic disease    Patient is here for medical clearance prior to proposed glossectomy with further surgeries if needed.      Underlying medical issues of hypertension, elevated blood sugar pending a diagnosis of diabetes and chronic cancer have been noted.  He also has mild to moderate anemia.      His blood pressures are under control.      No active chest pains or shortness of breath.      His EKG has been reviewed and does show some nonspecific changes.      The degree and extent of surgery and if there is any significance for potential bleeding is unknown to me at this point.      He does have significant medical conditions being in cancer state and generally in a borderline state of health with aphonic status and PEG tube but overall he is optimal within the limitations of his health.  He may take losartan via the PEG tube on the day of surgery.    Please monitor his blood pressures postoperatively and especially with the drop in blood pressure.    He should avoid taking the metformin on the day of surgery because he will be NPO.  If he stays in the hospital, he may be given a sliding scale of insulin to better control his sugars.  At this point it may not be prudent to start him on any insulin given other conflicting issues.      No further workup is needed at this point.    Please monitor his electrolytes also.  There was a slightly low sodium.    He will keep his regular follow-up with me  otherwise.    SPENT RICHARD 30 MINUTES WITH PATIENT WHICH INVOLVED REVIEW OF PTS MEDICAL CONDITIONS, LABS, MEDICATIONS AND WITH 50% OF TIME FACE-TO-FACE DISCUSSION ABOUT MEDICAL PROBLEMS, MANAGEMENT AND ANY APPLICABLE CHANGES.      Current Outpatient Medications:     acetaminophen (TYLENOL) 325 MG tablet, Take 325 mg by mouth every 6 (six) hours as needed for Pain., Disp: , Rfl:     esomeprazole (NEXIUM) 40 MG capsule, 40 mg before breakfast., Disp: , Rfl:     levothyroxine (SYNTHROID) 100 MCG tablet, Take 1 tablet (100 mcg total) by mouth before breakfast., Disp: 30 tablet, Rfl: 11    losartan (COZAAR) 100 MG tablet, Take 0.5 tablets (50 mg total) by mouth once daily., Disp: 45 tablet, Rfl: 3    meclizine (ANTIVERT) 25 mg tablet, 1 tablet (25 mg total) by Per G Tube route 3 (three) times daily as needed., Disp: 20 tablet, Rfl: 0    metFORMIN (GLUCOPHAGE) 500 MG tablet, Take 1 tablet (500 mg total) by mouth 2 (two) times daily with meals., Disp: 60 tablet, Rfl: 3    opium tincture 10 mg/mL (morphine) Tinc, Take 1 mL (10 mg total) by mouth 4 (four) times daily as needed., Disp: 118 mL, Rfl: 0    traZODone (DESYREL) 50 MG tablet, TAKE 1 TABLET BY MOUTH NIGHTLY AS NEEDED FOR INSOMNIA., Disp: 90 tablet, Rfl: 1    zolpidem (AMBIEN) 5 MG Tab, 1 tablet (5 mg total) by Per G Tube route nightly as needed (difficult with sleep)., Disp: 30 tablet, Rfl: 0

## 2022-11-02 NOTE — PROGRESS NOTES
[facility] Hematology/Oncology  PROGRESS NOTE -  Follow-up Visit      Subjective:       Patient ID:   NAME: Cyrus Batres Jr. : 1950     72 y.o. 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  The patient is here today to go over the results of the recently ordered labs, tests and studies. He is here by himself today       He previously saw Dr Lucero with Rad/onc, and Dr James and his case was presented to H&N Tumor Board at [facility] and  he general consensus was to proceed to surgery.    He has the surgery scheduled in [place] for     He is breathing ok. No HA's or CP; no pain at this time        He has portacath on left CW        Discussed covid19 and he has been vaccinated      ROS:   GEN: normal without any fever, night sweats or weight loss; he has gained some weight  HEENT: normal with no HA's, sore throat, stiff neck, changes in vision  CV: normal with no CP, SOB, PND, GRIFFIN or orthopnea  PULM: normal with no SOB, cough, hemoptysis, sputum or pleuritic pain  GI: chronic N/V with the chemotherapy; has peg tube; reflux  : normal with no hematuria, dysuria  BREAST: normal with no mass, discharge, pain  SKIN: normal with no rash, erythema, bruising, or swelling     Pain Scale:  0    Allergies:  Review of patient's allergies indicates:   Allergen Reactions    Pollen extracts     Lovastatin Rash     Not confirmed but pt skeptical       Medications:    Current Outpatient Medications:     acetaminophen (TYLENOL) 325 MG tablet, Take 325 mg by mouth every 6 (six) hours as needed for Pain., Disp: , Rfl:     esomeprazole (NEXIUM) 40 MG capsule, 40 mg before breakfast., Disp: , Rfl:     levothyroxine (SYNTHROID) 100 MCG tablet, Take 1 tablet (100 mcg  total) by mouth before breakfast., Disp: 30 tablet, Rfl: 11    losartan (COZAAR) 100 MG tablet, Take 0.5 tablets (50 mg total) by mouth once daily., Disp: 45 tablet, Rfl: 3    meclizine (ANTIVERT) 25 mg tablet, 1 tablet (25 mg total) by Per G Tube route 3 (three) times daily as needed., Disp: 20 tablet, Rfl: 0    metFORMIN (GLUCOPHAGE) 500 MG tablet, Take 1 tablet (500 mg total) by mouth 2 (two) times daily with meals., Disp: 60 tablet, Rfl: 3    opium tincture 10 mg/mL (morphine) Tinc, Take 1 mL (10 mg total) by mouth 4 (four) times daily as needed., Disp: 118 mL, Rfl: 0    traZODone (DESYREL) 50 MG tablet, TAKE 1 TABLET BY MOUTH NIGHTLY AS NEEDED FOR INSOMNIA., Disp: 90 tablet, Rfl: 1    zolpidem (AMBIEN) 5 MG Tab, 1 tablet (5 mg total) by Per G Tube route nightly as needed (difficult with sleep)., Disp: 30 tablet, Rfl: 0    PMHx/PSHx Updates:  See patient's last visit with me on 10/6/2022.  See H&P on 9/23/2022        Pathology:   Cancer Staging   Larynx cancer  Staging form: Larynx - Glottis, AJCC 8th Edition  - Clinical stage from 8/6/2020: Stage II (cT2, cN0, cM0) - Signed by Fariha Muñoz NP on 8/6/2020  - Pathologic stage from 1/20/2022: Stage LOU (pT4a, pN0, cM0) - Signed by Fariha Muñoz NP on 1/20/2022  Staging form: Pharynx - P16 Negative Oropharynx, AJCC 8th Edition  - Clinical: Stage II (rcT2, cN0, cM0) - Signed by Matheus Portillo Jr., MD on 5/30/2022    Primary cancer of base of tongue  Staging form: Pharynx - P16 Negative Oropharynx, AJCC 8th Edition  - Clinical stage from 5/20/2022: Stage II (cT2, cN0, cM0, p16-) - Signed by Saúl Kessler MD on 7/7/2022      Base of Tongue Biopsy  4/27/2022:  Final Pathologic Diagnosis BIOPSY OF BASE OF THE TONGUE:   THE INFILTRATING POORLY DIFFERENTIATED SQUAMOUS CELL CARCINOMA   THE TUMOR IS P16 NEGATIVE.  THE POSITIVE AND NEGATIVE CONTROLS STAINED   APPROPRIATELY      Larynx resection/thyroidectomy 1/6/2022:     Final Pathologic Diagnosis 1. Lymph nodes, left  neck levels 2,  3 and 4, dissection:   - Nineteen lymph nodes, all  negative  for metastatic carcinoma (0/19)   2. Lymph nodes, right neck levels 2,  3 and 4, dissection:   - Twenty-eight lymph nodes, all  negative  for metastatic carcinoma (0/28)   3. Possible parathyroid gland, excision:   - Benign parathyroid gland tissue (0.003 g)   4. 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 Soft tissue, posterior tracheal margin, excision:   - Benign, focally reactive respiratory mucosa with submucosal acute   inflammation,  negative  for dysplasia or malignancy   6. Soft tissue, anterior tracheal margin, excision:   - Benign respiratory mucosa with subepithelial cartilage,  negative  for   dysplasia or malignancy   7. Soft tissue, posterior tracheal margin #2, excision:   - Benign, focally reactive respiratory mucosa with submucosal acute   inflammation,  negative  for dysplasia or malignancy   8. Soft tissue, anterior tracheal margin #2, excision:   - Benign, reactive focally ulcerated respiratory mucosa with submucosal acute   inflammation,  negative  for dysplasia or malignancy             Laryngoscope 8/4/2020: (with Dr Noel):  Final Pathologic Diagnosis 1.  Left true vocal fold, biopsy:       -  Invasive moderately differentiated squamous cell carcinoma,   keratinizing type   2.  Left true vocal fold, biopsy:       -  Invasive moderately differentiated squamous cell carcinoma,   keratinizing type             Laryngoscope 3/17/2020 (with   "Dameon):  Final Pathologic Diagnosis 1.  BIOPSY OF LEFT TRUE VOCAL CORD:   SEVERELY DYSPLASTIC APPEARING SQUAMOUS MUCOSA   INVASIVE CARCINOMA IS NOT DOCUMENTED   ON THE OTHER HAND, THESE FRAGMENTS ARE NODUL AND WITHOUT SUBMUCOSA FOR   EVALUATION; IT IS POSSIBLE THAT THEY REFLECT INVASIVE SQUAMOUS CARCINOMA   2.  BIOPSY OF LEFT ANTERIOR COMMISSURE:   MODERATE DYSPLASIA   NO INVASIVE CARCINOMA IDENTIFIED             Objective:     Vitals:  Blood pressure 116/71, pulse 73, temperature 98.6 °F (37 °C), resp. rate 16, height 5' 6" (1.676 m), weight 65.8 kg (145 lb).    Physical Examination:   GEN: no apparent distress, comfortable; AAOx3  HEAD: atraumatic and normocephalic  EYES: no pallor, no icterus, PERRLA  ENT: OMM, no pharyngeal erythema, external ears WNL; no nasal discharge; no thrush; s/p laryngectomy with flap  NECK: no masses, thyroid normal, trachea midline, no LAD/LN's, supple  CV: RRR with no murmur; normal pulse; normal S1 and S2; no pedal edema; portacath left CW  CHEST: Normal respiratory effort; CTAB; normal breath sounds; no wheeze or crackles  ABDOM: nontender and nondistended; soft; normal bowel sounds; no rebound/guarding; peg tube  MUSC/Skeletal: ROM normal; no crepitus; joints normal; no deformities or arthropathy  EXTREM: no clubbing, cyanosis, inflammation or swelling  SKIN: no rashes, lesions, ulcers, petechiae or subcutaneous nodules  : no mahan  NEURO: grossly intact; motor/sensory WNL; AAOx3; no tremors  PSYCH: normal mood, affect and behavior  LYMPH: normal cervical, supraclavicular, axillary and groin LN's          Labs:     Lab Results   Component Value Date    WBC 10.85 11/01/2022    HGB 10.5 (L) 11/01/2022    HCT 31.0 (L) 11/01/2022    MCV 96 11/01/2022     (L) 11/01/2022     CMP  Sodium   Date Value Ref Range Status   11/01/2022 134 (L) 136 - 145 mmol/L Final     Potassium   Date Value Ref Range Status   11/01/2022 4.3 3.5 - 5.1 mmol/L Final     Chloride   Date Value Ref Range " Status   11/01/2022 99 95 - 110 mmol/L Final     CO2   Date Value Ref Range Status   11/01/2022 27 23 - 29 mmol/L Final     Glucose   Date Value Ref Range Status   11/01/2022 198 (H) 70 - 110 mg/dL Final     BUN   Date Value Ref Range Status   11/01/2022 25 (H) 8 - 23 mg/dL Final     Creatinine   Date Value Ref Range Status   11/01/2022 0.8 0.5 - 1.4 mg/dL Final     Calcium   Date Value Ref Range Status   11/01/2022 8.9 8.7 - 10.5 mg/dL Final     Total Protein   Date Value Ref Range Status   11/01/2022 8.0 6.0 - 8.4 g/dL Final     Albumin   Date Value Ref Range Status   11/01/2022 4.3 3.5 - 5.2 g/dL Final   11/18/2020 3.7 3.6 - 5.1 g/dL Final     Comment:     For additional information, please refer to   http://education.[employer]/faq/VNL487 (This link is   being provided for informational/ educational purposes only.)  This test was developed and its analytical performance   characteristics have been determined by [facility]. It has not been cleared or approved by the   US Food and Drug Administration. This assay has been validated   pursuant to the CLIA regulations and is used for clinical   purposes.  @ Test Performed By:  [employer] Damar  Yo Cortes M.D.,   [address] [place] [id number]  [place]  [id number]       Total Bilirubin   Date Value Ref Range Status   11/01/2022 0.6 0.1 - 1.0 mg/dL Final     Comment:     For infants and newborns, interpretation of results should be based  on gestational age, weight and in agreement with clinical  observations.    Premature Infant recommended reference ranges:  Up to 24 hours.............<8.0 mg/dL  Up to 48 hours............<12.0 mg/dL  3-5 days..................<15.0 mg/dL  6-29 days.................<15.0 mg/dL       Alkaline Phosphatase   Date Value Ref Range Status   11/01/2022 87 55 - 135 U/L Final     AST   Date Value Ref Range Status   11/01/2022 19 10 - 40 U/L  Final     ALT   Date Value Ref Range Status   11/01/2022 16 10 - 44 U/L Final     Anion Gap   Date Value Ref Range Status   11/01/2022 8 8 - 16 mmol/L Final     eGFR if    Date Value Ref Range Status   07/29/2022 >60.0 >60 mL/min/1.73 m^2 Final     eGFR if non    Date Value Ref Range Status   07/29/2022 >60.0 >60 mL/min/1.73 m^2 Final     Comment:     Calculation used to obtain the estimated glomerular filtration  rate (eGFR) is the CKD-EPI equation.          Lab Results   Component Value Date    IRON 58 09/04/2022    TRANSFERRIN 159 (L) 09/04/2022    TIBC 223 (L) 09/04/2022    FESATURATED 26 09/04/2022            Radiology/Diagnostic Studies:      CTA Neck    Result Date: 9/20/2022  EXAMINATION: CTA NECK CLINICAL HISTORY: Head/neck cancer, monitor;Malignant neoplasm of base of tongue TECHNIQUE: CT angiogram was performed from the level of the scott to the EAC following the IV administration of 75mL of Omnipaque 350.   Sagittal and coronal reconstructions and maximum intensity projection reconstructions were performed. Arterial stenosis percentages are based on NASCET measurement criteria. COMPARISON: CTA neck dated 05/20/2022 FINDINGS: Aortic arch and great vessels: There is a conventional left-sided 3 vessel arch.  The aortic arch is widely patent.  There is stable soft and calcified plaque in the proximal left subclavian artery with a similar appearance the prior study and possibly within radiation field but without critical stenosis or occlusion.  The right subclavian artery is patent.  The brachiocephalic trunk and origin of the left common carotid artery are patent. Carotid arteries Right carotid artery: There is calcified plaque scattered in the right common carotid artery without critical stenosis, occlusion or change.  There is no measurable stenosis of the internal carotid artery which is patent to the skull base. Left carotid artery: There is mild plaque scattered in the  left common carotid artery without change and without critical stenosis or occlusion.  There is no measurable stenosis of the internal carotid artery. Vertebral arteries: The left vertebral artery is dominant and patent throughout its course in the neck.  The right vertebral artery is hypoplastic but patent.  There is no critical stenosis, occlusion, thrombus or dissection.  There is no change. The study extends intracranially.  There is calcified plaque in the V4 segment of the left vertebral artery resulting in a moderate to marked short segment nonocclusive stenosis.  The right vertebral artery partially terminates in a posteroinferior cerebellar artery with a short segment moderate to marked stenosis of the very distal right vertebral artery.  Some flow within the distal right vertebral artery may represent retrograde flow from the dominant left vertebral artery.  The basilar artery is patent.  The origins of the superior cerebellar and posterior cerebral artery on the right are patent.  There is fetal origin of the left posterior cerebral artery which is patent. There is plaque in the cavernous segments of both internal carotid arteries but there is no critical stenosis or large vessel occlusion of the anterior circulation vessels.  There is no large aneurysm. Other: There is a similar appearance of the included upper lungs with pleural and parenchymal changes anteriorly in the upper lobes bilaterally.  As previously discussed, timing of the contrast bolus is performed during the arterial phase for CTA neck.  Therefore, enhancement of the soft tissues is somewhat suboptimal due to this technique. There has been a large region of soft tissue resection in the anterior mid and lower neck soft tissues with continued open defect in the upper chest and lower neck above the manubrium.  Soft tissue seen along the left posterolateral border of the trachea could represent secretions or mucus.  This was present  previously. Redemonstrated is a large heterogeneously enhancing lesion centered at the level of the tongue base and floor of the mouth centrally into the left.  There is scattered calcifications.  The prior CTA demonstrated regions of central necrosis which are no longer definitely present but diffusely heterogeneous density and enhancement likely represents regions of necrosis.  This large heterogeneously enhancing soft tissue mass extends more anteriorly in the floor of the mouth on the left and measures at least 5.6 cm in greatest anterior to posterior dimension with 3.6 cm transverse dimension.  This does extend anteriorly to the medial margin of the body of the mandible on the left. There are post treatment changes with stranding in the neck fat.     1. Similar appearance of the neck vessels when compared to the prior CTA neck with mild narrowing at the origin of the left subclavian artery.  There is no critical stenosis, occlusion, thrombosis or dissection involving the cervical vertebral or carotid arteries. 2. Larger mass within the hypopharynx and tongue base extending anteriorly to the floor of the mouth on the left to the medial margin of the body of the mandible without obvious bony destruction. 3. There is nonocclusive stenosis of the distal V4 segment of the left vertebral artery without change. 4. There is no large vessel occlusion or critical stenosis of the anterior circulation. 5. There is developmental variation with fetal origin of the left posterior cerebral artery. Electronically signed by: Rex Joyner MD Date:    09/20/2022 Time:    11:31    CT Abdomen Pelvis With Contrast    Result Date: 9/1/2022  CMS MANDATED QUALITY DATA - CT RADIATION - 436 All CT scans at this facility utilize dose modulation, iterative reconstruction, and/or weight based dosing when appropriate to reduce radiation dose to as low as reasonably achievable. Reason: abdominal pain partial history of laryngeal carcinoma  TECHNIQUE: CT abdomen and pelvis with 100 mL Omnipaque 350. COMPARISON: PET/CT 5/13/2022 CT ABDOMEN: Coronary artery calcification and extremely tiny hiatal hernia incidentally noted. Visualized lung bases are clear. Liver, gallbladder, pancreas, spleen, adrenals, and kidneys are normal. Mild aortoiliac calcifications present. Gastrostomy tube tip lies in distal gastric body. Small intestines are unremarkable. Mild wall thickening affects the ascending colon with pneumatosis intestinalis diffusely affecting the ascending colon. No other free intraperitoneal gas or, and remainder of colon is normal. A normal appendix is present. The major mesenteric vascular structures are patent. No acute osseous abnormality. CT PELVIS: Prostate is slightly enlarged. Bladder is normal. No free pelvic fluid. Tiny right and small to moderate left fat-containing inguinal hernias are present. No acute osseous abnormality. IMPRESSION: 1. Pneumatosis intestinalis affecting the ascending colon with associated mild wall thickening. Etiology of this is undetermined. Potential considerations include intestinal ischemia or pneumatosis related to chemotherapy. Clinical and laboratory correlation is needed to assess significance. No portal venous gas or free intraperitoneal air however. 2. Coronary artery calcifications Electronically signed by:  Nael Hui MD  9/1/2022 6:20 PM CDT Workstation: [id number]    NM PET CT Routine Skull to Mid Thigh    Result Date: 9/27/2022  PET CT WITH IMAGE FUSION HISTORY:  restage tongue cancer RESTAGING...  HX: TONGUE CA.  DX: April 2022.  LAST CHEMO TREATMENT WAS 3WKS AGO.  EVALUATE TX RESPONSE.   ALSO HX OF LARYNGEAL CA IN AUGUST 2020.  MULTIPLE SURGERIES: LARYNGECTOMY, THYROIDECTOMY & TRACHEOSTOMY.  RAD TX WAS COMPLETED IN NOV 2020. TECHNIQUE: Following IV administration of 11.2 mCi of F-18 labeled FDG into right antecubital fossa and a 60 minute delay, PET CT was performed from the vertex of the skull  through the proximal thighs with an integrated PET CT scanner with image fusion. CT images were obtained to aid in attenuation correction and PET localization. The patient's serum glucose at the time of the exam was 136 mg/dL. COMPARISON: PET/CT 5/13/2022 FINDINGS: Poorly characterized soft tissue mass involving base of tongue approximately measures 4.3 x 2.8 cm (series 3 image 65) appearing similar to the prior exam. This reaches current max SUV of 11.8, not significantly changed from prior of 11.4. No other abnormal FDG activity. Intracranial compartment is unremarkable. Trace bilateral maxillary sinus mucosal thickening is present. Postoperative changes of laryngectomy and tracheostomy are present. Surgical clips occur throughout the neck. No definite enlarged cervical or supraclavicular lymph node, with evaluation limited by lack of IV contrast. Left subclavian port catheter tip terminates in SVC. Diffuse coronary artery calcifications are present. No enlarged mediastinal or axillary lymph nodes. No pulmonary nodule or mass. Gastrostomy tube tip lies in gastric body. Moderate aortoiliac calcifications are present. Small fat-containing left inguinal hernia incidentally noted. Mild degenerative changes affect the spine. No suspicious osseous abnormality. IMPRESSION: 1. No significant change in the FDG avid mass involving the tongue base, remaining characteristic of malignancy. 2. No new FDG avid malignancy or metastatic disease. Electronically signed by:  Nael Hui MD  9/27/2022 2:38 PM CDT Workstation: [id number]    CT Abdomen Pelvis  Without Contrast    Result Date: 9/4/2022  CMS MANDATED QUALITY DATA - CT RADIATION  436 All CT scans at this facility utilize dose modulation, iterative reconstruction, and/or weight based dosing when appropriate to reduce radiation dose to as low as reasonably achievable. CT ABDOMEN PELVIS WITHOUT IV CONTRAST CLINICAL HISTORY: 72 years Male Follow-up pneumatosis COMPARISON: CT  abdomen and pelvis September 1, 2022 FINDINGS: Imaging through the lower thorax demonstrates subsegmental atelectasis of the dependent lower lobes. Coronary artery calcification. Bone window images show no acute or aggressive osseous abnormality. Transitional lumbosacral vertebral body. On this unenhanced exam, no focal hepatic lesion. Gallbladder and biliary tree are unremarkable. Spleen appears normal. Pancreas is unremarkable. No adrenal lesion. No renal calculi or hydronephrosis. Ureters are normal in caliber. Urinary bladder is within normal limits. Gastrostomy tube is in place within the stomach. Stomach is largely collapsed. No evidence of small bowel obstruction. Pneumatosis and pericolonic abscess involving the ascending colon is redemonstrated, slightly diminished compared to prior. Mild wall thickening throughout the colon. No free fluid or free air within the abdomen or pelvis. Aortoiliac atherosclerotic calcification. No pathologically enlarged lymph nodes within the abdomen or pelvis. Bilateral fat-containing inguinal hernias, larger on the left. IMPRESSION: Pneumatosis and pericolonic gas about the ascending colon has decreased in volume compared to prior. Persistent colonic wall thickening which could indicate colitis. Coronary and aortic atherosclerosis. Gastrostomy tube is within the stomach. Bilateral inguinal hernias. Electronically signed by:  Jose De Jesus Oleary MD  9/4/2022 4:06 PM CDT Workstation: [id number]    CTA neck  5/20/2022:     IMPRESSION:  Persistent mass within the hypopharynx consistent with malignancy.  By my measurements it is larger than on April 24, 2022 with central necrosis.  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 This is similar to the previous April 2022 study.  No evidence of arterial compromise related to malignancy.     PET 5/13/2022:     IMPRESSION:  1. Postoperative changes of prior laryngectomy and lymph  node resection/radiation.  2. Masslike FDG avid lesion to the left of midline at the tongue base concerning for residual/recurrent disease.  3. Adjacent confluent nodular extension along the inferior left side of the mass.  4. No FDG avid lymphadenopathy or convincing additional sites of disease in the chest, abdomen or pelvis.     CT head 5/10/2022:  FINDINGS: Comparison to multiple prior exams. There is no acute intracranial hemorrhage, with no mass effect or abnormal extra-axial fluid. Mild scattered areas of nonspecific hypoattenuation involve the deep periventricular white matter, with gray-white differentiation maintained.     There is mild generalized prominence of the cortical sulci and ventricles. The cerebellum and brainstem are unremarkable. There are carotid siphon vascular calcifications. The visualized paranasal sinuses and mastoid air cells are clear. There is no acute calvarial fracture or scalp hematoma.     IMPRESSION: No acute intracranial hemorrhage or acute calvarial fracture     CT soft neck 4/24/2022;     Oral tongue/tongue base:  At the base of the tongue on the left there are findings of a heterogeneously enhancing mass measuring 2.1 cm highly concerning for a neoplastic process.     True and false cords:  Patient status post laryngectomy which has been performed in the interim. Soft tissue stranding in the lower neck likely related to a previous flat reconstruction. No enhancement or lymphadenopathy within the lower neck.     Lymph node assessment:Negative     Surrounding soft tissues:  Negative     Vasculature:  Negative     Osseous structures:  Negative     Lung apices:  Scarring involving the lung apices bilaterally likely related to previous radiation.     IMPRESSION:  1. Postoperative and radiation changes involving the lower neck.  2. Findings highly concerning for a developing mass at the left base of the tongue enhancing 2.1 cm region. Direct visualization in this region would be of  benefit.        CT soft neck  12/24/2021  IMPRESSION:  1.  Laryngeal mass as on prior exam. Laryngeal airway narrowing has not changed.  2.  No evidence for active hemorrhage.  3.  Partially visualized patchy groundglass infiltrates in both upper lobes. Also see CT chest report     CTA Chest 12/24/2021:  IMPRESSION:     1.  No pulmonary embolism.     2.  Soft tissue stranding in the region of the strap musculature with ill-defined hypodensity measuring 2.8 x 1.4 cm in the cervical midline.  Findings concerning for infectious process or abscess. Neoplastic process could have similar imaging characteristics.     3.  Suggested erosion of the proximal right parasymphyseal aspect of the thyroid shield.  Correlated with CT soft tissue neck findings. Findings could represent osteomyelitis. Neoplastic process cannot be excluded.     4.  Hepatic steatosis.  5.  Thickening of the gastric wall. Prominence of perigastric vasculature. Small hiatal hernia.     6.  Moderate stool burden.  Correlate for constipation.        PET 12/15/2021:  IMPRESSION:     Substantial qualitative and quantitative increase of hypermetabolic FDG activity associated with the larynx in this patient with known supraglottic neoplasm.     No evidence of FDG avid metastatic disease involving neck, chest, abdomen or pelvis.     PET 8/21/2020:     Impression:     1. Intensely hypermetabolic plaque-like mass along the left true vocal cord, compatible with known laryngeal carcinoma.  2. No findings of regional metastatic disease in the neck, or distant metastatic disease.        CT Chest 8/10/2020:     IMPRESSION:     No metastatic disease within the chest.  Three-vessel coronary artery calcification. Nondilated cardiac  chambers     CT Soft neck 7/22/2020:  IMPRESSION:  1. Slight interval increase in size of the previously described  enhancing nodule along the anterior left vocal cord.  2. No pathologic lymphadenopathy.  3. Additional and incidental findings  as noted above.     CT Soft neck  3/16/2020:     Impression:     6 mm enhancing focus involving the superior aspect of the left focal cord near the anterior commisure.  Recommend direct visualization for further evaluation of laryngeal polyp     Question of 2 mm submucosal lipoma involving the midportion of the superior aspect of the left vocal cord.     Mild arteriosclerosis involving the brachiocephalic arteries and carotid arteries     Mild degenerative change of the cervical spine        I have reviewed all available lab results and radiology reports.    Assessment/Plan:   (1) 72 y.o. 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     - Patient has been under the care of Dr Kho[facility]hi [facility] and Dr Portillo with rad/onc.   - He was recently found to have a new primary cancer involving the base of his tongue in April 2022. He was deemed not to be a candidate for any further radiation and did not want to undergo any further surgery as this would necessitate a glossectomy procedure.   - He has been on adjuvant chemotherapy per direction of Dr Khoobehi and has had 3 cycles. His therapy course has been complicated with persistent diarrhea and N/V.      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 He saw Dr Lucero with Rad/onc this am. They are going to present his case to H&N Tumor Board at [facility] and plans to see Dr James about surgical options. If he is not a surgical candidate then will  proceed with cisplatin and XRT combine therapy.     10/6/2022:  - He saw Dr Lucero with Rad/onc, and Dr James and his case was presented to H&N Tumor Board at [facility] and  he general consensus was to proceed to surgery.  - he is awaiting surgery date  - labs are adequate    11/3/2022:  - He has the surgery scheduled in [place] for 11/9     (2) Hx/of Laryngeal cancer in Aug 2020 stage II (cT2 N0)  - s/p radiation followed by bilateral neck dissection and total thyroidectomy     Brief Oncology Summary for his laryngeal CA hx:     Patient was noted to initially have a suspicious lesion on the left true vocal cord by laryngoscopy with Dr Xiao in March 2020 with biopsy showing severe dysplastic changes without definitive invasive process. He had a CT scan on 3/16/2020 which showed a 6mm nodule on the left vocal cord. A repeat Ct scan four months later on 7/22/2020 showed the nodule enlarged to 1.4 x 1.1 cm in size. Patient was then seen by Dr Noel and underwent repeat scope in Aug 2020 who found a bulky deeply invading tumor which was debulked and the pathology coming back moderately differentiated SCCA without lymphovascular or perineural invasion. Hs case was subsequently presented to the tumor board and the consensus was to proceed with radiation. He completed XRT on 10/30/2020 and proceeded with regular laryngoscopy surveillance. He eventually required salvage laryngectomy and neck dissection with flap with Dr James on Jan 6th 2022. 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 Fifty lymph nodes were removed which were all negative. Pathology TNM was pT4a, pN0. Patient did not require any adjuvant therapy afterwards.      (2) HTN and hypercholesterolemia     (3) Paroxysmal atrial fibrillation, V tach     (4) DM II     (5) B12 deficiency      (6) Fatty liver disease, GERD            VISIT DIAGNOSES:      Larynx cancer    Chemotherapy-induced neutropenia    Primary cancer of base of tongue    Vitamin B12 deficiency    Iron deficiency anemia due to chronic blood loss    Abnormal CT scan, neck        PLAN:  Proceed with planned surgery on 11/9  F/u with Dr James with ENT and Dr Lucero with rad/onc  Check labs weekly  F/u with PCP, ENT, etc     RTC in  3-4 weeks  Fax note to  Bandar/Dameon Lucero Hasney, Yesenia Gomez       Discussion:     COVID-19 Discussion:     I had long discussion with patient and any applicable family about the COVID-19 coronavirus epidemic and the recommended precautions with regard to cancer and/or hematology patients. I have re-iterated the CDC recommendations for adequate hand washing, use of hand -like products, and coughing into elbow, etc. In addition, especially for our patients who are on chemotherapy and/or our otherwise immunocompromised patients, I have recommended avoidance of crowds, including movie theaters, restaurants, churches, etc. I have recommended avoidance of any sick or symptomatic family members and/or friends. I have also recommended avoidance of any raw and unwashed food products, and general avoidance of food items that have not been prepared by themselves. The patient has been asked to call us immediately with any symptom developments, issues, questions or other general concerns.         Pathology Discussion:     I reviewed and discussed the pathology report(s) and radiograph reports (if available) in as simple to understand and/or laymen's terms to the best of my ability. I had an indepth conversation with the patient and went over the patient's individual diagnosis based on the information that was currently available. I discussed the TNM staging process with regard to the patient's particular cancer type, and the calculated stage based on the currently available TNM data and literature. I discussed the available  "prognostic data with regard to the current staging information and how it relates to the prognosis of their particular neoplastic process.          NCCN Guidelines:     I discussed the available treatment option(s) in accordance with the latest literature from the NCCN Clinical Practice Guidelines for the patient's particular type of cancer disorder. The NCCN Guidelines provide a "document evidence-based (and) consensus-driven management" of the care of oncology patients. The treatment recommendations were made not only in accordance to the NCCN guidelines, but also factored in to account the patient's overall age, condition, performance status and their medical co-morbidities. I went over the risks and benefits of the the treatment options (if any could be made) with regard to their particular cancer type, their cancer stage, their age, and their co-morbidities.         I have explained and the patient understands all of  the current recommendation(s). I have answered all of their questions to the best of my ability and to their complete satisfaction.         I spent over 25 mins of time with the patient. Reviewed results of the recently ordered labs, tests and studies; made directives with regards to the results. Over half of this time was spent couseling and coordinating care.    I have explained all of the above in detail and the patient understands all of the current recommendation(s). I have answered all of their questions to the best of my ability and to their complete satisfaction.   The patient is to continue with the current management plan.            Electronically signed by Venu Tamez MD               "

## 2022-11-02 NOTE — Clinical Note
Patient is medically optimal given his multiple competing and challenging conditions.  He may take his blood pressure medication with PEG tube.  Avoid taking medication for diabetes.  He may need monitoring of his blood sugars and sliding scale if he is in the hospital.  Best regards   Dr. Yesenia DONOVAN

## 2022-11-02 NOTE — LETTER
November 2, 2022    Jesse James MD  [address]  [place] [address]       [facility] / Internal Medicine  [address]  [place] [address]  Phone: [phone]  Fax: [phone]   Patient: Cyrus Batres Jr.   MR Number: [id number]   YOB: 1950   Date of Visit: 11/2/2022       Dear Dr. James:    Thank you for referring Cyrus Batres to me for evaluation. Below are the relevant portions of my assessment and plan of care.    Patient is reasonably stable for proposed surgery.  Underlying medical issues of hypertension, elevated blood sugars have been noted.  He continues on metformin.  He should hold his blood sugar medication on the day of surgery.      He should take his blood pressure medication on the day of surgery.    Postoperatively if he stays in the hospital, he may do well with a low-dose sliding scale of insulin with the help of internal medicine or endocrinology.    I am not sure about the anticipated blood loss given his underlying anemia but I am sure, there be backup of transfusion if needed.    At this point he is medically optimal for proposed surgery.        If you have questions, please do not hesitate to call me. I look forward to following Cyrus along with you.    Sincerely,      Maico Patterson MD           CC  No Recipients

## 2022-11-03 ENCOUNTER — TELEPHONE (OUTPATIENT)
Dept: HEMATOLOGY/ONCOLOGY | Facility: CLINIC | Age: 72
End: 2022-11-03

## 2022-11-03 ENCOUNTER — OFFICE VISIT (OUTPATIENT)
Dept: HEMATOLOGY/ONCOLOGY | Facility: CLINIC | Age: 72
End: 2022-11-03
Payer: MEDICARE

## 2022-11-03 VITALS
HEART RATE: 73 BPM | DIASTOLIC BLOOD PRESSURE: 71 MMHG | BODY MASS INDEX: 23.3 KG/M2 | SYSTOLIC BLOOD PRESSURE: 116 MMHG | WEIGHT: 145 LBS | HEIGHT: 66 IN | TEMPERATURE: 99 F | RESPIRATION RATE: 16 BRPM

## 2022-11-03 DIAGNOSIS — T45.1X5A CHEMOTHERAPY INDUCED DIARRHEA: ICD-10-CM

## 2022-11-03 DIAGNOSIS — D50.0 IRON DEFICIENCY ANEMIA DUE TO CHRONIC BLOOD LOSS: ICD-10-CM

## 2022-11-03 DIAGNOSIS — E53.8 VITAMIN B12 DEFICIENCY: ICD-10-CM

## 2022-11-03 DIAGNOSIS — C32.9 LARYNX CANCER: Primary | ICD-10-CM

## 2022-11-03 DIAGNOSIS — R93.89 ABNORMAL CT SCAN, NECK: ICD-10-CM

## 2022-11-03 DIAGNOSIS — D70.1 CHEMOTHERAPY-INDUCED NEUTROPENIA: ICD-10-CM

## 2022-11-03 DIAGNOSIS — R13.14 DYSPHAGIA, PHARYNGOESOPHAGEAL: ICD-10-CM

## 2022-11-03 DIAGNOSIS — C01 PRIMARY CANCER OF BASE OF TONGUE: ICD-10-CM

## 2022-11-03 DIAGNOSIS — C32.9 LARYNX NEOPLASM MALIGNANT: Primary | ICD-10-CM

## 2022-11-03 DIAGNOSIS — K52.1 CHEMOTHERAPY INDUCED DIARRHEA: ICD-10-CM

## 2022-11-03 DIAGNOSIS — T45.1X5A CHEMOTHERAPY-INDUCED NEUTROPENIA: ICD-10-CM

## 2022-11-03 PROCEDURE — 4010F ACE/ARB THERAPY RXD/TAKEN: CPT | Mod: CPTII,S$GLB,, | Performed by: INTERNAL MEDICINE

## 2022-11-03 PROCEDURE — 3078F DIAST BP <80 MM HG: CPT | Mod: CPTII,S$GLB,, | Performed by: INTERNAL MEDICINE

## 2022-11-03 PROCEDURE — 3074F PR MOST RECENT SYSTOLIC BLOOD PRESSURE < 130 MM HG: ICD-10-PCS | Mod: CPTII,S$GLB,, | Performed by: INTERNAL MEDICINE

## 2022-11-03 PROCEDURE — 4010F PR ACE/ARB THEARPY RXD/TAKEN: ICD-10-PCS | Mod: CPTII,S$GLB,, | Performed by: INTERNAL MEDICINE

## 2022-11-03 PROCEDURE — 1126F AMNT PAIN NOTED NONE PRSNT: CPT | Mod: CPTII,S$GLB,, | Performed by: INTERNAL MEDICINE

## 2022-11-03 PROCEDURE — 3074F SYST BP LT 130 MM HG: CPT | Mod: CPTII,S$GLB,, | Performed by: INTERNAL MEDICINE

## 2022-11-03 PROCEDURE — 1101F PT FALLS ASSESS-DOCD LE1/YR: CPT | Mod: CPTII,S$GLB,, | Performed by: INTERNAL MEDICINE

## 2022-11-03 PROCEDURE — 99214 PR OFFICE/OUTPT VISIT, EST, LEVL IV, 30-39 MIN: ICD-10-PCS | Mod: S$GLB,,, | Performed by: INTERNAL MEDICINE

## 2022-11-03 PROCEDURE — 1126F PR PAIN SEVERITY QUANTIFIED, NO PAIN PRESENT: ICD-10-PCS | Mod: CPTII,S$GLB,, | Performed by: INTERNAL MEDICINE

## 2022-11-03 PROCEDURE — 3060F PR POS MICROALBUMINURIA RESULT DOCUMENTED/REVIEW: ICD-10-PCS | Mod: CPTII,S$GLB,, | Performed by: INTERNAL MEDICINE

## 2022-11-03 PROCEDURE — 3044F PR MOST RECENT HEMOGLOBIN A1C LEVEL <7.0%: ICD-10-PCS | Mod: CPTII,S$GLB,, | Performed by: INTERNAL MEDICINE

## 2022-11-03 PROCEDURE — 3066F NEPHROPATHY DOC TX: CPT | Mod: CPTII,S$GLB,, | Performed by: INTERNAL MEDICINE

## 2022-11-03 PROCEDURE — 3288F FALL RISK ASSESSMENT DOCD: CPT | Mod: CPTII,S$GLB,, | Performed by: INTERNAL MEDICINE

## 2022-11-03 PROCEDURE — 1101F PR PT FALLS ASSESS DOC 0-1 FALLS W/OUT INJ PAST YR: ICD-10-PCS | Mod: CPTII,S$GLB,, | Performed by: INTERNAL MEDICINE

## 2022-11-03 PROCEDURE — 3008F BODY MASS INDEX DOCD: CPT | Mod: CPTII,S$GLB,, | Performed by: INTERNAL MEDICINE

## 2022-11-03 PROCEDURE — 1159F PR MEDICATION LIST DOCUMENTED IN MEDICAL RECORD: ICD-10-PCS | Mod: CPTII,S$GLB,, | Performed by: INTERNAL MEDICINE

## 2022-11-03 PROCEDURE — 3044F HG A1C LEVEL LT 7.0%: CPT | Mod: CPTII,S$GLB,, | Performed by: INTERNAL MEDICINE

## 2022-11-03 PROCEDURE — 3066F PR DOCUMENTATION OF TREATMENT FOR NEPHROPATHY: ICD-10-PCS | Mod: CPTII,S$GLB,, | Performed by: INTERNAL MEDICINE

## 2022-11-03 PROCEDURE — 3008F PR BODY MASS INDEX (BMI) DOCUMENTED: ICD-10-PCS | Mod: CPTII,S$GLB,, | Performed by: INTERNAL MEDICINE

## 2022-11-03 PROCEDURE — 3288F PR FALLS RISK ASSESSMENT DOCUMENTED: ICD-10-PCS | Mod: CPTII,S$GLB,, | Performed by: INTERNAL MEDICINE

## 2022-11-03 PROCEDURE — 1160F PR REVIEW ALL MEDS BY PRESCRIBER/CLIN PHARMACIST DOCUMENTED: ICD-10-PCS | Mod: CPTII,S$GLB,, | Performed by: INTERNAL MEDICINE

## 2022-11-03 PROCEDURE — 3078F PR MOST RECENT DIASTOLIC BLOOD PRESSURE < 80 MM HG: ICD-10-PCS | Mod: CPTII,S$GLB,, | Performed by: INTERNAL MEDICINE

## 2022-11-03 PROCEDURE — 1159F MED LIST DOCD IN RCRD: CPT | Mod: CPTII,S$GLB,, | Performed by: INTERNAL MEDICINE

## 2022-11-03 PROCEDURE — 3060F POS MICROALBUMINURIA REV: CPT | Mod: CPTII,S$GLB,, | Performed by: INTERNAL MEDICINE

## 2022-11-03 PROCEDURE — 99214 OFFICE O/P EST MOD 30 MIN: CPT | Mod: S$GLB,,, | Performed by: INTERNAL MEDICINE

## 2022-11-03 PROCEDURE — 1160F RVW MEDS BY RX/DR IN RCRD: CPT | Mod: CPTII,S$GLB,, | Performed by: INTERNAL MEDICINE

## 2022-11-06 ENCOUNTER — PATIENT MESSAGE (OUTPATIENT)
Dept: SURGERY | Facility: HOSPITAL | Age: 72
End: 2022-11-06
Payer: MEDICARE

## 2022-11-08 ENCOUNTER — PATIENT MESSAGE (OUTPATIENT)
Dept: OTOLARYNGOLOGY | Facility: CLINIC | Age: 72
End: 2022-11-08
Payer: MEDICARE

## 2022-11-08 ENCOUNTER — TELEPHONE (OUTPATIENT)
Dept: OTOLARYNGOLOGY | Facility: CLINIC | Age: 72
End: 2022-11-08
Payer: MEDICARE

## 2022-11-08 ENCOUNTER — ANESTHESIA EVENT (OUTPATIENT)
Dept: SURGERY | Facility: HOSPITAL | Age: 72
DRG: 141 | End: 2022-11-08
Payer: MEDICARE

## 2022-11-08 RX ORDER — SODIUM CHLORIDE 0.9 % (FLUSH) 0.9 %
10 SYRINGE (ML) INJECTION
Status: CANCELLED | OUTPATIENT
Start: 2022-11-08

## 2022-11-08 RX ORDER — HYDROMORPHONE HYDROCHLORIDE 1 MG/ML
0.2 INJECTION, SOLUTION INTRAMUSCULAR; INTRAVENOUS; SUBCUTANEOUS EVERY 5 MIN PRN
Status: CANCELLED | OUTPATIENT
Start: 2022-11-08

## 2022-11-08 RX ORDER — HALOPERIDOL 5 MG/ML
0.5 INJECTION INTRAMUSCULAR EVERY 10 MIN PRN
Status: CANCELLED | OUTPATIENT
Start: 2022-11-08

## 2022-11-08 NOTE — ANESTHESIA PREPROCEDURE EVALUATION
[facility]  Anesthesia Pre-Operative Evaluation         Patient Name: Cyrus Batres Jr.  YOB: 1950  MRN: [id number]    SUBJECTIVE:     Pre-operative evaluation for Procedure(s) (LRB):  PHARYNGECTOMY (N/A)  GLOSSECTOMY (Bilateral)  CREATION, FREE FLAP (N/A)     11/08/2022    Cyrus Batres Jr. is a 72 y.o. male w/ a significant PMHx of HTN, DM2, Afib on OAC, GERD, hx of laryngeal SCC s/p laryngectomy and ALT flap on 1/2022, now with SCC at base on tongue.     Patient now presents for the above procedure(s).    TTE: None documented.    LDA:   Port A Cath Single Lumen left subclavian (Active)   Site Assessment No drainage;No swelling;No redness;No warmth 10/26/22 1019   Patency/Care flushed w/o difficulty;blood return present;heparin locked 10/26/22 1019   Status Accessed 10/26/22 1019   Accessed by: johanna rollins rn 10/26/22 1019   Needle Insertion Date 10/26/22 10/26/22 1019   Needle Insertion Time 1019 10/26/22 1019   Type of Needle Lester 10/26/22 1019   Gauge 20 10/26/22 1019   Needle Length 1 in 10/26/22 1019   Needle Status Removed 10/26/22 1019   Flush Performed Yes 10/26/22 1019   Needle Removal Date 10/26/22 10/26/22 1019   Needle Removal Time 1020 10/26/22 1019   Deaccessed By johanna rollins rn 10/26/22 1019   Number of days:             Gastrostomy/Enterostomy 12/27/21 1152 Percutaneous endoscopic gastrostomy (PEG) LUQ (Active)   Number of days: 316            Gastrostomy/Enterostomy 06/09/22 1935 midline feeding (Active)   Number of days: 151       Prev airway: Trach in place.    Drips: None documented.      Patient Active Problem List   Diagnosis    Melanocytic nevus    Body mass index (BMI) of 29.0-29.9 in adult    Benign hypertension    Overweight    Paroxysmal atrial fibrillation    Type 2 diabetes mellitus with diabetic arthropathy, with long-term current use of insulin    Fatty liver disease, nonalcoholic    False positive serological test for hepatitis C    Hyperlipidemia     Type 2 diabetes mellitus with hyperglycemia, without long-term current use of insulin    Gastroesophageal reflux disease without esophagitis    Type 2 diabetes mellitus with hyperglycemia    Vitamin B12 deficiency    Hyperuricemia    Hypovitaminosis D    Proteinuria    On enteral nutrition    Larynx cancer    Dehydration    NSVT (nonsustained ventricular tachycardia)    Hemoptysis    Adverse effect of adrenal cortical steroids, sequela    S/P laryngectomy    Hypocalcemia    Aphonia    Dysphagia, pharyngoesophageal    Acute pain of both shoulders    Bilateral arm weakness    Oral bleeding    Primary cancer of base of tongue    Advanced care planning/counseling discussion    Iron deficiency anemia due to chronic blood loss    Postoperative hypothyroidism    Pressure injury of sacral region, stage 1    Pneumatosis intestinalis    Nausea and vomiting    Neutropenia    Abnormal CT scan, neck       Review of patient's allergies indicates:   Allergen Reactions    Pollen extracts     Lovastatin Rash     Not confirmed but pt skeptical       Current Inpatient Medications:      No current facility-administered medications on file prior to encounter.     Current Outpatient Medications on File Prior to Encounter   Medication Sig Dispense Refill    levothyroxine (SYNTHROID) 100 MCG tablet Take 1 tablet (100 mcg total) by mouth before breakfast. 30 tablet 11    acetaminophen (TYLENOL) 325 MG tablet Take 325 mg by mouth every 6 (six) hours as needed for Pain.      esomeprazole (NEXIUM) 40 MG capsule 40 mg before breakfast.      metFORMIN (GLUCOPHAGE) 500 MG tablet Take 1 tablet (500 mg total) by mouth 2 (two) times daily with meals. 60 tablet 3    opium tincture 10 mg/mL (morphine) Tinc Take 1 mL (10 mg total) by mouth 4 (four) times daily as needed. 118 mL 0    traZODone (DESYREL) 50 MG tablet TAKE 1 TABLET BY MOUTH NIGHTLY AS NEEDED FOR INSOMNIA. 90 tablet 1    zolpidem (AMBIEN) 5 MG Tab 1  tablet (5 mg total) by Per G Tube route nightly as needed (difficult with sleep). 30 tablet 0    [DISCONTINUED] aspirin (ECOTRIN) 81 MG EC tablet Take 81 mg by mouth Daily.         Past Surgical History:   Procedure Laterality Date    DIRECT LARYNGOBRONCHOSCOPY N/A 12/27/2021    Procedure: LARYNGOSCOPY, DIRECT, WITH BRONCHOSCOPY;  Surgeon: Flex Espinosa MD;  Location: [facility] OR [address] [place] [address];  Service: ENT;  Laterality: N/A;    DISSECTION OF NECK Bilateral 1/6/2022    Procedure: DISSECTION, NECK;  Surgeon: Jesse James MD;  Location: [facility] OR [address] [place] [address];  Service: ENT;  Laterality: Bilateral;    FLAP PROCEDURE Right 1/6/2022    Procedure: CREATION, FREE FLAP;  Surgeon: Alise Hart MD;  Location: [address];  Service: ENT;  Laterality: Right;  Ischemic start 1351  Ischemic stop 1502    INSERTION OF TUNNELED CENTRAL VENOUS CATHETER (CVC) WITH SUBCUTANEOUS PORT N/A 6/9/2022    Procedure: [id number];  Surgeon: Jesus Viera MD;  Location: [facility];  Service: General;  Laterality: N/A;    LARYNGECTOMY N/A 1/6/2022    Procedure: LARYNGECTOMY;  Surgeon: Jesse James MD;  Location: [address];  Service: ENT;  Laterality: N/A;    LARYNGOSCOPY N/A 8/4/2020    Procedure: Suspension microlaryngoscopy with biopsy, possible KTP laser treatment/excision;  Surgeon: Stew Noel MD;  Location: [address];  Service: ENT;  Laterality: N/A;  Microscope, telescopes, tower, microinstruments, KTP laser, rep conf# [id number] IC 7/28.    LARYNGOSCOPY N/A 3/16/2021    Procedure: Suspension microlaryngoscopy with excision of lesion, possible CO2 laser;  Surgeon: Stew Noel MD;  Location: [address];  Service: ENT;  Laterality: N/A;  Microscope, telescopes, tower, microinstruments, CO2 laser, rep conf# [id number] IC 3/4.    LARYNGOSCOPY N/A 4/1/2021    Procedure: Suspension microlaryngoscopy with KTP laser excision of lesion;  Surgeon: Stew Noel MD;  Location: [facility] OR [facility];  Service: ENT;  Laterality: N/A;  Microscope, telescopes, tower, microinstruments, 70 degree scope, vocal fold , KTP laser, rep conf# [id number] BC    LARYNGOSCOPY N/A 12/9/2021    Procedure: Suspension microlaryngoscopy with biopsy;  Surgeon: Stew Noel MD;  [facility];  Service: ENT;  Laterality: N/A;  Microscope, telescopes, tower, microinstruments    LARYNGOSCOPY N/A 1/6/2022    Procedure: LARYNGOSCOPY;  Surgeon: Jesse James MD;  Location: [facility] OR [facility];  Service: ENT;  Laterality: N/A;    LARYNGOSCOPY N/A 4/27/2022    Procedure: LARYNGOSCOPY WITH BIOPSY;  Surgeon: Jesse James MD;  [facility];  Service: ENT;  Laterality: N/A;    MICROLARYNGOSCOPY N/A 3/17/2020    Procedure: MICROLARYNGOSCOPY;  Surgeon: Jung Xiao MD;  Location: [facility];  Service: ENT;  Laterality: N/A;  Laser Microlaryngoscopy  NEED TO SCHEDULE LASER from [employer] [id number]    REIMPLANTATION OF PARATHYROID TISSUE N/A 1/6/2022    Procedure: REIMPLANTATION, PARATHYROID TISSUE;  Surgeon: Jesse James MD;  [facility];  Service: ENT;  Laterality: N/A;    THYROIDECTOMY  1/6/2022    Procedure: THYROIDECTOMY;  Surgeon: Jesse James MD;  [facility];  Service: ENT;;    TRACHEOSTOMY N/A 12/27/2021    Procedure: CREATION, TRACHEOSTOMY;  Surgeon: Flex Espinosa MD;  [facility];  Service: ENT;  Laterality: N/A;       OBJECTIVE:     Vital Signs Range (Last 24H):         Significant Labs:  Lab Results   Component Value Date    WBC 10.85 11/01/2022    HGB 10.5 (L) 11/01/2022    HCT 31.0 (L) 11/01/2022     (L) 11/01/2022    CHOL 144 02/14/2022    TRIG 120 09/03/2022    HDL 48 02/14/2022    ALT 16 11/01/2022    AST 19 11/01/2022     (L) 11/01/2022    K 4.3 11/01/2022    CL 99 11/01/2022    CREATININE 0.8 11/01/2022    BUN 25 (H) 11/01/2022    CO2 27 11/01/2022    TSH 1.020 10/05/2022    INR 1.1  05/23/2022    HGBA1C 6.6 (H) 05/15/2022    MICROALBUR 112.2 10/18/2018       Diagnostic Studies: No relevant studies.    EKG:   Results for orders placed or performed during the hospital encounter of 11/01/22   EKG 12-lead    Collection Time: 11/01/22  2:19 AM    Narrative    Test Reason : R42,    Vent. Rate : 075 BPM     Atrial Rate : 075 BPM     P-R Int : 166 ms          QRS Dur : 084 ms      QT Int : 384 ms       P-R-T Axes : 042 -04 010 degrees     QTc Int : 428 ms    Normal sinus rhythm with sinus arrhythmia  Normal ECG  When compared with ECG of 01-SEP-2022 16:56,  T wave inversion now evident in Inferior leads  Nonspecific T wave abnormality no longer evident in Lateral leads  Confirmed by Mina Carlton MD ([id number]) on 11/2/2022 3:35:22 PM    Referred By: [email or web address]   SELF           Confirmed By:Mina Carlton MD       ASSESSMENT/PLAN:                                                                                                                11/08/2022  Cyrus Batres Jr. is a 72 y.o., male.      Pre-op Assessment    I have reviewed the Patient Summary Reports.     I have reviewed the Nursing Notes. I have reviewed the NPO Status.   I have reviewed the Medications.     Review of Systems  Anesthesia Hx:  Denies Family Hx of Anesthesia complications.  Personal Hx of Anesthesia complications Slow To Awaken/Delayed Emergence   Social:  Non-Smoker    Hematology/Oncology:         -- Anemia (10/31): Current/Recent Cancer. (tongue cancer) --  Cancer in past history (throat cancer):    EENT/Dental:EENT/Dental Normal   Cardiovascular:   Hypertension Dysrhythmias (PAF and history of nonsustained ventricular tachycardia) atrial fibrillation    Pulmonary:  Pulmonary Normal Tracheostomy status post laryngectomy 2021   Renal/:  Renal/ Normal     Hepatic/GI:   GERD    Musculoskeletal:  Musculoskeletal Normal    Neurological:  Neurology Normal    Endocrine:   Diabetes, using insulin Hypothyroidism    Psych:  Psychiatric  Normal           Physical Exam  General: Well nourished, Cooperative and Alert    Airway:  Mallampati: II   Mouth Opening: Normal  TM Distance: Normal  Tongue: Normal  Neck ROM: Normal ROM  Pre-Existing Airway: Tracheostomy tube        Anesthesia Plan  Type of Anesthesia, risks & benefits discussed:    Anesthesia Type: Gen ETT  Intra-op Monitoring Plan: Standard ASA Monitors and Art Line  Post Op Pain Control Plan: multimodal analgesia and IV/PO Opioids PRN  Induction:  IV  Airway Plan: Direct, Post-Induction  Informed Consent: Informed consent signed with the Patient and all parties understand the risks and agree with anesthesia plan.  All questions answered.   ASA Score: 3  Day of Surgery Review of History & Physical: H&P Update referred to the surgeon/provider.    Ready For Surgery From Anesthesia Perspective.     .

## 2022-11-08 NOTE — PRE-PROCEDURE INSTRUCTIONS
PREOP INSTRUCTIONS TO PATIENT'S WIFE:  No food,milk or milk products for 8 hours before surgery.  Clear liquids like water,gatorade,apple juice are allowed up until 2 hours before surgery.  Instructed to follow the surgeon's instructions if they differ from these.  Shower instructions as well as directions to the Surgery Center were given.  Encouraged to wear loose fitting,comfortable clothing.  Medication instructions for pm prior to and am of procedure reviewed.  Instructed to avoid taking vitamins,supplements,aspirin and ibuprofen the morning of surgery.

## 2022-11-09 ENCOUNTER — ANESTHESIA (OUTPATIENT)
Dept: SURGERY | Facility: HOSPITAL | Age: 72
DRG: 141 | End: 2022-11-09
Payer: MEDICARE

## 2022-11-09 ENCOUNTER — HOSPITAL ENCOUNTER (INPATIENT)
Facility: HOSPITAL | Age: 72
LOS: 11 days | Discharge: HOME HEALTH | DRG: 141 | End: 2022-11-20
Attending: OTOLARYNGOLOGY | Admitting: OTOLARYNGOLOGY
Payer: MEDICARE

## 2022-11-09 DIAGNOSIS — S01.502A OPEN WOUND OF MOUTH, INITIAL ENCOUNTER: ICD-10-CM

## 2022-11-09 DIAGNOSIS — S71.102A OPEN WOUND OF LEFT THIGH, INITIAL ENCOUNTER: ICD-10-CM

## 2022-11-09 DIAGNOSIS — S11.90XA OPEN WOUND OF NECK, INITIAL ENCOUNTER: Primary | ICD-10-CM

## 2022-11-09 DIAGNOSIS — C01 MALIGNANT NEOPLASM OF BASE OF TONGUE: ICD-10-CM

## 2022-11-09 DIAGNOSIS — Z93.0 TRACHEOSTOMY IN PLACE: ICD-10-CM

## 2022-11-09 DIAGNOSIS — C01 SQUAMOUS CELL CARCINOMA OF BASE OF TONGUE: ICD-10-CM

## 2022-11-09 DIAGNOSIS — S11.20XA: ICD-10-CM

## 2022-11-09 DIAGNOSIS — Z90.02 S/P LARYNGECTOMY: ICD-10-CM

## 2022-11-09 DIAGNOSIS — E44.1 MALNUTRITION OF MILD DEGREE: ICD-10-CM

## 2022-11-09 DIAGNOSIS — Z78.9 ON ENTERAL NUTRITION: ICD-10-CM

## 2022-11-09 LAB
ABO + RH BLD: NORMAL
ALBUMIN SERPL BCP-MCNC: 3.2 G/DL (ref 3.5–5.2)
ALLENS TEST: ABNORMAL
ALP SERPL-CCNC: 60 U/L (ref 55–135)
ALT SERPL W/O P-5'-P-CCNC: 9 U/L (ref 10–44)
ANION GAP SERPL CALC-SCNC: 14 MMOL/L (ref 8–16)
AST SERPL-CCNC: 20 U/L (ref 10–40)
BASOPHILS # BLD AUTO: 0.01 K/UL (ref 0–0.2)
BASOPHILS NFR BLD: 0.1 % (ref 0–1.9)
BILIRUB SERPL-MCNC: 2.6 MG/DL (ref 0.1–1)
BLD GP AB SCN CELLS X3 SERPL QL: NORMAL
BUN SERPL-MCNC: 20 MG/DL (ref 8–23)
CA-I BLDV-SCNC: 1.23 MMOL/L (ref 1.06–1.42)
CALCIUM SERPL-MCNC: 9.2 MG/DL (ref 8.7–10.5)
CHLORIDE SERPL-SCNC: 112 MMOL/L (ref 95–110)
CO2 SERPL-SCNC: 14 MMOL/L (ref 23–29)
CREAT SERPL-MCNC: 0.9 MG/DL (ref 0.5–1.4)
DELSYS: ABNORMAL
DIFFERENTIAL METHOD: ABNORMAL
EOSINOPHIL # BLD AUTO: 0 K/UL (ref 0–0.5)
EOSINOPHIL NFR BLD: 0 % (ref 0–8)
ERYTHROCYTE [DISTWIDTH] IN BLOOD BY AUTOMATED COUNT: 14.6 % (ref 11.5–14.5)
EST. GFR  (NO RACE VARIABLE): >60 ML/MIN/1.73 M^2
ESTIMATED AVG GLUCOSE: 117 MG/DL (ref 68–131)
GLUCOSE SERPL-MCNC: 163 MG/DL (ref 70–110)
HBA1C MFR BLD: 5.7 % (ref 4–5.6)
HCO3 UR-SCNC: 18.1 MMOL/L (ref 24–28)
HCT VFR BLD AUTO: 26.8 % (ref 40–54)
HGB BLD-MCNC: 9.1 G/DL (ref 14–18)
IMM GRANULOCYTES # BLD AUTO: 0.03 K/UL (ref 0–0.04)
IMM GRANULOCYTES NFR BLD AUTO: 0.3 % (ref 0–0.5)
LYMPHOCYTES # BLD AUTO: 0.4 K/UL (ref 1–4.8)
LYMPHOCYTES NFR BLD: 3.7 % (ref 18–48)
MAGNESIUM SERPL-MCNC: 1.8 MG/DL (ref 1.6–2.6)
MCH RBC QN AUTO: 32.4 PG (ref 27–31)
MCHC RBC AUTO-ENTMCNC: 34 G/DL (ref 32–36)
MCV RBC AUTO: 95 FL (ref 82–98)
MONOCYTES # BLD AUTO: 0.7 K/UL (ref 0.3–1)
MONOCYTES NFR BLD: 6.1 % (ref 4–15)
NEUTROPHILS # BLD AUTO: 10.1 K/UL (ref 1.8–7.7)
NEUTROPHILS NFR BLD: 89.8 % (ref 38–73)
NRBC BLD-RTO: 0 /100 WBC
PCO2 BLDA: 28.7 MMHG (ref 35–45)
PH SMN: 7.41 [PH] (ref 7.35–7.45)
PHOSPHATE SERPL-MCNC: 5.4 MG/DL (ref 2.7–4.5)
PLATELET # BLD AUTO: 152 K/UL (ref 150–450)
PMV BLD AUTO: 11.3 FL (ref 9.2–12.9)
PO2 BLDA: 110 MMHG (ref 80–100)
POC BE: -7 MMOL/L
POC SATURATED O2: 98 % (ref 95–100)
POC TCO2: 19 MMOL/L (ref 23–27)
POCT GLUCOSE: 123 MG/DL (ref 70–110)
POTASSIUM SERPL-SCNC: 5.1 MMOL/L (ref 3.5–5.1)
PROT SERPL-MCNC: 5.4 G/DL (ref 6–8.4)
PTH-INTACT SERPL-MCNC: <5 PG/ML (ref 9–77)
RBC # BLD AUTO: 2.81 M/UL (ref 4.6–6.2)
SAMPLE: ABNORMAL
SITE: ABNORMAL
SODIUM SERPL-SCNC: 140 MMOL/L (ref 136–145)
WBC # BLD AUTO: 11.28 K/UL (ref 3.9–12.7)

## 2022-11-09 PROCEDURE — 88341 IMHCHEM/IMCYTCHM EA ADD ANTB: CPT | Mod: 26,,, | Performed by: STUDENT IN AN ORGANIZED HEALTH CARE EDUCATION/TRAINING PROGRAM

## 2022-11-09 PROCEDURE — 25000003 PHARM REV CODE 250: Performed by: STUDENT IN AN ORGANIZED HEALTH CARE EDUCATION/TRAINING PROGRAM

## 2022-11-09 PROCEDURE — 99900035 HC TECH TIME PER 15 MIN (STAT)

## 2022-11-09 PROCEDURE — 25500020 PHARM REV CODE 255: Performed by: OTOLARYNGOLOGY

## 2022-11-09 PROCEDURE — 36000709 HC OR TIME LEV III EA ADD 15 MIN: Performed by: OTOLARYNGOLOGY

## 2022-11-09 PROCEDURE — 25000003 PHARM REV CODE 250: Performed by: OTOLARYNGOLOGY

## 2022-11-09 PROCEDURE — C1769 GUIDE WIRE: HCPCS | Performed by: OTOLARYNGOLOGY

## 2022-11-09 PROCEDURE — 38700 REMOVAL OF LYMPH NODES NECK: CPT | Mod: 50,22,, | Performed by: OTOLARYNGOLOGY

## 2022-11-09 PROCEDURE — 15757 FREE SKIN FLAP MICROVASC: CPT | Mod: ,,, | Performed by: OTOLARYNGOLOGY

## 2022-11-09 PROCEDURE — 88307 TISSUE EXAM BY PATHOLOGIST: CPT | Mod: 26,,, | Performed by: STUDENT IN AN ORGANIZED HEALTH CARE EDUCATION/TRAINING PROGRAM

## 2022-11-09 PROCEDURE — 83735 ASSAY OF MAGNESIUM: CPT | Performed by: OTOLARYNGOLOGY

## 2022-11-09 PROCEDURE — 36620 INSERTION CATHETER ARTERY: CPT | Mod: 59,,, | Performed by: ANESTHESIOLOGY

## 2022-11-09 PROCEDURE — 13121 PR RECMPL WND SCALP,EXTR 2.6-7.5 CM: ICD-10-PCS | Mod: 59,,, | Performed by: OTOLARYNGOLOGY

## 2022-11-09 PROCEDURE — 42699 UNLISTED PX SALIVRY GLND/DUX: CPT | Mod: ,,, | Performed by: OTOLARYNGOLOGY

## 2022-11-09 PROCEDURE — 88307 TISSUE EXAM BY PATHOLOGIST: CPT | Performed by: STUDENT IN AN ORGANIZED HEALTH CARE EDUCATION/TRAINING PROGRAM

## 2022-11-09 PROCEDURE — P9045 ALBUMIN (HUMAN), 5%, 250 ML: HCPCS | Mod: JG | Performed by: NURSE ANESTHETIST, CERTIFIED REGISTERED

## 2022-11-09 PROCEDURE — 41140 REMOVAL OF TONGUE: CPT | Mod: 22,,, | Performed by: OTOLARYNGOLOGY

## 2022-11-09 PROCEDURE — 36620 PR INSERT CATH,ART,PERCUT,SHORTTERM: ICD-10-PCS | Mod: 59,,, | Performed by: ANESTHESIOLOGY

## 2022-11-09 PROCEDURE — 88309 TISSUE EXAM BY PATHOLOGIST: CPT | Performed by: STUDENT IN AN ORGANIZED HEALTH CARE EDUCATION/TRAINING PROGRAM

## 2022-11-09 PROCEDURE — 25000003 PHARM REV CODE 250: Performed by: NURSE ANESTHETIST, CERTIFIED REGISTERED

## 2022-11-09 PROCEDURE — 85025 COMPLETE CBC W/AUTO DIFF WBC: CPT | Performed by: OTOLARYNGOLOGY

## 2022-11-09 PROCEDURE — P9016 RBC LEUKOCYTES REDUCED: HCPCS | Performed by: OTOLARYNGOLOGY

## 2022-11-09 PROCEDURE — 88309 TISSUE EXAM BY PATHOLOGIST: CPT | Mod: 26,,, | Performed by: STUDENT IN AN ORGANIZED HEALTH CARE EDUCATION/TRAINING PROGRAM

## 2022-11-09 PROCEDURE — 27201037 HC PRESSURE MONITORING SET UP

## 2022-11-09 PROCEDURE — 63600175 PHARM REV CODE 636 W HCPCS: Performed by: OTOLARYNGOLOGY

## 2022-11-09 PROCEDURE — 15120 PR SPLIT GRFT,HEAD,FAC,HAND,FEET <100 SQCM: ICD-10-PCS | Mod: 51,,, | Performed by: OTOLARYNGOLOGY

## 2022-11-09 PROCEDURE — 27000221 HC OXYGEN, UP TO 24 HOURS

## 2022-11-09 PROCEDURE — 41140 PR REMOVAL OF TONGUE: ICD-10-PCS | Mod: 22,,, | Performed by: OTOLARYNGOLOGY

## 2022-11-09 PROCEDURE — 37000008 HC ANESTHESIA 1ST 15 MINUTES: Performed by: OTOLARYNGOLOGY

## 2022-11-09 PROCEDURE — 63600175 PHARM REV CODE 636 W HCPCS: Performed by: STUDENT IN AN ORGANIZED HEALTH CARE EDUCATION/TRAINING PROGRAM

## 2022-11-09 PROCEDURE — 82330 ASSAY OF CALCIUM: CPT | Performed by: STUDENT IN AN ORGANIZED HEALTH CARE EDUCATION/TRAINING PROGRAM

## 2022-11-09 PROCEDURE — 88342 CHG IMMUNOCYTOCHEMISTRY: ICD-10-PCS | Mod: 26,,, | Performed by: STUDENT IN AN ORGANIZED HEALTH CARE EDUCATION/TRAINING PROGRAM

## 2022-11-09 PROCEDURE — D9220A PRA ANESTHESIA: ICD-10-PCS | Mod: CRNA,,, | Performed by: NURSE ANESTHETIST, CERTIFIED REGISTERED

## 2022-11-09 PROCEDURE — 84100 ASSAY OF PHOSPHORUS: CPT | Performed by: OTOLARYNGOLOGY

## 2022-11-09 PROCEDURE — 88342 IMHCHEM/IMCYTCHM 1ST ANTB: CPT | Performed by: STUDENT IN AN ORGANIZED HEALTH CARE EDUCATION/TRAINING PROGRAM

## 2022-11-09 PROCEDURE — C1729 CATH, DRAINAGE: HCPCS | Performed by: OTOLARYNGOLOGY

## 2022-11-09 PROCEDURE — 86901 BLOOD TYPING SEROLOGIC RH(D): CPT | Performed by: OTOLARYNGOLOGY

## 2022-11-09 PROCEDURE — 40845 PR RECONSTRUC MOUTH COMPLEX: ICD-10-PCS | Mod: 51,,, | Performed by: OTOLARYNGOLOGY

## 2022-11-09 PROCEDURE — 88341 IMHCHEM/IMCYTCHM EA ADD ANTB: CPT | Performed by: STUDENT IN AN ORGANIZED HEALTH CARE EDUCATION/TRAINING PROGRAM

## 2022-11-09 PROCEDURE — 13122 PR REP,SKIN,SCALP/EXTREM+5 CM/<: ICD-10-PCS | Mod: 59,,, | Performed by: OTOLARYNGOLOGY

## 2022-11-09 PROCEDURE — 42890 LIMITED PHARYNGECTOMY: CPT | Mod: 22,51,, | Performed by: OTOLARYNGOLOGY

## 2022-11-09 PROCEDURE — 13121 CMPLX RPR S/A/L 2.6-7.5 CM: CPT | Mod: 59,,, | Performed by: OTOLARYNGOLOGY

## 2022-11-09 PROCEDURE — D9220A PRA ANESTHESIA: Mod: CRNA,,, | Performed by: NURSE ANESTHETIST, CERTIFIED REGISTERED

## 2022-11-09 PROCEDURE — S5010 5% DEXTROSE AND 0.45% SALINE: HCPCS | Performed by: STUDENT IN AN ORGANIZED HEALTH CARE EDUCATION/TRAINING PROGRAM

## 2022-11-09 PROCEDURE — 88342 IMHCHEM/IMCYTCHM 1ST ANTB: CPT | Mod: 26,,, | Performed by: STUDENT IN AN ORGANIZED HEALTH CARE EDUCATION/TRAINING PROGRAM

## 2022-11-09 PROCEDURE — 27200966 HC CLOSED SUCTION SYSTEM

## 2022-11-09 PROCEDURE — 83970 ASSAY OF PARATHORMONE: CPT | Performed by: STUDENT IN AN ORGANIZED HEALTH CARE EDUCATION/TRAINING PROGRAM

## 2022-11-09 PROCEDURE — 63600175 PHARM REV CODE 636 W HCPCS: Mod: JG | Performed by: NURSE ANESTHETIST, CERTIFIED REGISTERED

## 2022-11-09 PROCEDURE — 83036 HEMOGLOBIN GLYCOSYLATED A1C: CPT | Performed by: STUDENT IN AN ORGANIZED HEALTH CARE EDUCATION/TRAINING PROGRAM

## 2022-11-09 PROCEDURE — 82962 GLUCOSE BLOOD TEST: CPT | Performed by: OTOLARYNGOLOGY

## 2022-11-09 PROCEDURE — 13122 CMPLX RPR S/A/L ADDL 5 CM/>: CPT | Mod: 59,,, | Performed by: OTOLARYNGOLOGY

## 2022-11-09 PROCEDURE — 82803 BLOOD GASES ANY COMBINATION: CPT

## 2022-11-09 PROCEDURE — 27201423 OPTIME MED/SURG SUP & DEVICES STERILE SUPPLY: Performed by: OTOLARYNGOLOGY

## 2022-11-09 PROCEDURE — 88309 PR  SURG PATH,LEVEL VI: ICD-10-PCS | Mod: 26,,, | Performed by: STUDENT IN AN ORGANIZED HEALTH CARE EDUCATION/TRAINING PROGRAM

## 2022-11-09 PROCEDURE — D9220A PRA ANESTHESIA: Mod: ANES,,, | Performed by: ANESTHESIOLOGY

## 2022-11-09 PROCEDURE — 42699: ICD-10-PCS | Mod: ,,, | Performed by: OTOLARYNGOLOGY

## 2022-11-09 PROCEDURE — D9220A PRA ANESTHESIA: ICD-10-PCS | Mod: ANES,,, | Performed by: ANESTHESIOLOGY

## 2022-11-09 PROCEDURE — 27800903 OPTIME MED/SURG SUP & DEVICES OTHER IMPLANTS: Performed by: OTOLARYNGOLOGY

## 2022-11-09 PROCEDURE — 88341 PR IHC OR ICC EACH ADD'L SINGLE ANTIBODY  STAINPR: ICD-10-PCS | Mod: 26,,, | Performed by: STUDENT IN AN ORGANIZED HEALTH CARE EDUCATION/TRAINING PROGRAM

## 2022-11-09 PROCEDURE — 36000708 HC OR TIME LEV III 1ST 15 MIN: Performed by: OTOLARYNGOLOGY

## 2022-11-09 PROCEDURE — 86920 COMPATIBILITY TEST SPIN: CPT | Performed by: OTOLARYNGOLOGY

## 2022-11-09 PROCEDURE — 37799 UNLISTED PX VASCULAR SURGERY: CPT

## 2022-11-09 PROCEDURE — 80053 COMPREHEN METABOLIC PANEL: CPT | Performed by: OTOLARYNGOLOGY

## 2022-11-09 PROCEDURE — 40845 RECONSTRUCTION OF MOUTH: CPT | Mod: 51,,, | Performed by: OTOLARYNGOLOGY

## 2022-11-09 PROCEDURE — 94761 N-INVAS EAR/PLS OXIMETRY MLT: CPT

## 2022-11-09 PROCEDURE — 20000000 HC ICU ROOM

## 2022-11-09 PROCEDURE — 88307 PR  SURG PATH,LEVEL V: ICD-10-PCS | Mod: 26,,, | Performed by: STUDENT IN AN ORGANIZED HEALTH CARE EDUCATION/TRAINING PROGRAM

## 2022-11-09 PROCEDURE — 38700 PR REMOVAL NODES, NECK,SUPRAHYOID: ICD-10-PCS | Mod: 50,22,, | Performed by: OTOLARYNGOLOGY

## 2022-11-09 PROCEDURE — 15757 PR FREE SKIN FLAP W MICROVASC ANAST: ICD-10-PCS | Mod: ,,, | Performed by: OTOLARYNGOLOGY

## 2022-11-09 PROCEDURE — 37000009 HC ANESTHESIA EA ADD 15 MINS: Performed by: OTOLARYNGOLOGY

## 2022-11-09 PROCEDURE — 42890 PR PARTIAL REMOVAL OF PHARYNX: ICD-10-PCS | Mod: 22,51,, | Performed by: OTOLARYNGOLOGY

## 2022-11-09 PROCEDURE — 15120 SPLT AGRFT F/S/N/H/F/G/M 1ST: CPT | Mod: 51,,, | Performed by: OTOLARYNGOLOGY

## 2022-11-09 DEVICE — COUPLER 2.0MM: Type: IMPLANTABLE DEVICE | Site: NECK | Status: FUNCTIONAL

## 2022-11-09 RX ORDER — MORPHINE SULFATE 2 MG/ML
2 INJECTION, SOLUTION INTRAMUSCULAR; INTRAVENOUS EVERY 4 HOURS PRN
Status: DISCONTINUED | OUTPATIENT
Start: 2022-11-09 | End: 2022-11-10

## 2022-11-09 RX ORDER — ONDANSETRON 2 MG/ML
4 INJECTION INTRAMUSCULAR; INTRAVENOUS EVERY 12 HOURS
Status: COMPLETED | OUTPATIENT
Start: 2022-11-09 | End: 2022-11-12

## 2022-11-09 RX ORDER — ONDANSETRON 2 MG/ML
4 INJECTION INTRAMUSCULAR; INTRAVENOUS EVERY 6 HOURS PRN
Status: DISCONTINUED | OUTPATIENT
Start: 2022-11-09 | End: 2022-11-09

## 2022-11-09 RX ORDER — FENTANYL CITRATE 50 UG/ML
INJECTION, SOLUTION INTRAMUSCULAR; INTRAVENOUS
Status: DISCONTINUED | OUTPATIENT
Start: 2022-11-09 | End: 2022-11-09

## 2022-11-09 RX ORDER — INSULIN ASPART 100 [IU]/ML
0-5 INJECTION, SOLUTION INTRAVENOUS; SUBCUTANEOUS
Status: DISCONTINUED | OUTPATIENT
Start: 2022-11-09 | End: 2022-11-14

## 2022-11-09 RX ORDER — ENOXAPARIN SODIUM 100 MG/ML
40 INJECTION SUBCUTANEOUS EVERY 24 HOURS
Status: DISCONTINUED | OUTPATIENT
Start: 2022-11-10 | End: 2022-11-20 | Stop reason: HOSPADM

## 2022-11-09 RX ORDER — TRAZODONE HYDROCHLORIDE 50 MG/1
50 TABLET ORAL NIGHTLY
Status: DISCONTINUED | OUTPATIENT
Start: 2022-11-09 | End: 2022-11-20 | Stop reason: HOSPADM

## 2022-11-09 RX ORDER — ONDANSETRON 2 MG/ML
4 INJECTION INTRAMUSCULAR; INTRAVENOUS EVERY 8 HOURS PRN
Status: ACTIVE | OUTPATIENT
Start: 2022-11-10 | End: 2022-11-11

## 2022-11-09 RX ORDER — GLUCAGON 1 MG
1 KIT INJECTION
Status: DISCONTINUED | OUTPATIENT
Start: 2022-11-09 | End: 2022-11-14

## 2022-11-09 RX ORDER — LIDOCAINE HYDROCHLORIDE 20 MG/ML
INJECTION, SOLUTION EPIDURAL; INFILTRATION; INTRACAUDAL; PERINEURAL
Status: DISCONTINUED | OUTPATIENT
Start: 2022-11-09 | End: 2022-11-09

## 2022-11-09 RX ORDER — HYDROCODONE BITARTRATE AND ACETAMINOPHEN 5; 325 MG/1; MG/1
1 TABLET ORAL EVERY 6 HOURS PRN
Status: DISCONTINUED | OUTPATIENT
Start: 2022-11-09 | End: 2022-11-10

## 2022-11-09 RX ORDER — GABAPENTIN 300 MG/1
300 CAPSULE ORAL 3 TIMES DAILY
Status: DISCONTINUED | OUTPATIENT
Start: 2022-11-09 | End: 2022-11-14

## 2022-11-09 RX ORDER — NALOXONE HCL 0.4 MG/ML
0.02 VIAL (ML) INJECTION
Status: DISCONTINUED | OUTPATIENT
Start: 2022-11-10 | End: 2022-11-20 | Stop reason: HOSPADM

## 2022-11-09 RX ORDER — ACETAMINOPHEN 10 MG/ML
INJECTION, SOLUTION INTRAVENOUS
Status: DISCONTINUED | OUTPATIENT
Start: 2022-11-09 | End: 2022-11-09

## 2022-11-09 RX ORDER — KETAMINE HCL IN 0.9 % NACL 50 MG/5 ML
SYRINGE (ML) INTRAVENOUS
Status: DISCONTINUED | OUTPATIENT
Start: 2022-11-09 | End: 2022-11-09

## 2022-11-09 RX ORDER — MUPIROCIN 20 MG/G
OINTMENT TOPICAL 2 TIMES DAILY
Status: DISPENSED | OUTPATIENT
Start: 2022-11-09 | End: 2022-11-14

## 2022-11-09 RX ORDER — LIDOCAINE HYDROCHLORIDE 10 MG/ML
1 INJECTION, SOLUTION EPIDURAL; INFILTRATION; INTRACAUDAL; PERINEURAL ONCE
Status: DISCONTINUED | OUTPATIENT
Start: 2022-11-09 | End: 2022-11-09 | Stop reason: HOSPADM

## 2022-11-09 RX ORDER — LEVOTHYROXINE SODIUM 100 UG/1
100 TABLET ORAL
Status: DISCONTINUED | OUTPATIENT
Start: 2022-11-10 | End: 2022-11-20 | Stop reason: HOSPADM

## 2022-11-09 RX ORDER — DEXTROSE MONOHYDRATE AND SODIUM CHLORIDE 5; .45 G/100ML; G/100ML
INJECTION, SOLUTION INTRAVENOUS CONTINUOUS
Status: DISCONTINUED | OUTPATIENT
Start: 2022-11-09 | End: 2022-11-11

## 2022-11-09 RX ORDER — ROCURONIUM BROMIDE 10 MG/ML
INJECTION, SOLUTION INTRAVENOUS
Status: DISCONTINUED | OUTPATIENT
Start: 2022-11-09 | End: 2022-11-09

## 2022-11-09 RX ORDER — AMOXICILLIN 250 MG
1 CAPSULE ORAL 2 TIMES DAILY
Status: DISCONTINUED | OUTPATIENT
Start: 2022-11-09 | End: 2022-11-20 | Stop reason: HOSPADM

## 2022-11-09 RX ORDER — PROPOFOL 10 MG/ML
VIAL (ML) INTRAVENOUS
Status: DISCONTINUED | OUTPATIENT
Start: 2022-11-09 | End: 2022-11-09

## 2022-11-09 RX ORDER — IBUPROFEN 200 MG
200 TABLET ORAL EVERY 6 HOURS PRN
COMMUNITY

## 2022-11-09 RX ORDER — SODIUM CHLORIDE 0.9 % (FLUSH) 0.9 %
10 SYRINGE (ML) INJECTION
Status: DISCONTINUED | OUTPATIENT
Start: 2022-11-09 | End: 2022-11-20 | Stop reason: HOSPADM

## 2022-11-09 RX ORDER — ONDANSETRON 2 MG/ML
INJECTION INTRAMUSCULAR; INTRAVENOUS
Status: DISCONTINUED | OUTPATIENT
Start: 2022-11-09 | End: 2022-11-09

## 2022-11-09 RX ORDER — LOSARTAN POTASSIUM 50 MG/1
50 TABLET ORAL DAILY
Status: DISCONTINUED | OUTPATIENT
Start: 2022-11-10 | End: 2022-11-10

## 2022-11-09 RX ORDER — ALBUMIN HUMAN 50 G/1000ML
SOLUTION INTRAVENOUS CONTINUOUS PRN
Status: DISCONTINUED | OUTPATIENT
Start: 2022-11-09 | End: 2022-11-09

## 2022-11-09 RX ORDER — PHENYLEPHRINE HCL IN 0.9% NACL 1 MG/10 ML
SYRINGE (ML) INTRAVENOUS
Status: DISCONTINUED | OUTPATIENT
Start: 2022-11-09 | End: 2022-11-09

## 2022-11-09 RX ORDER — DEXAMETHASONE SODIUM PHOSPHATE 4 MG/ML
INJECTION, SOLUTION INTRA-ARTICULAR; INTRALESIONAL; INTRAMUSCULAR; INTRAVENOUS; SOFT TISSUE
Status: DISCONTINUED | OUTPATIENT
Start: 2022-11-09 | End: 2022-11-09

## 2022-11-09 RX ORDER — PROCHLORPERAZINE EDISYLATE 5 MG/ML
5 INJECTION INTRAMUSCULAR; INTRAVENOUS EVERY 6 HOURS PRN
Status: DISCONTINUED | OUTPATIENT
Start: 2022-11-09 | End: 2022-11-20 | Stop reason: HOSPADM

## 2022-11-09 RX ORDER — SODIUM CHLORIDE 0.9 % (FLUSH) 0.9 %
10 SYRINGE (ML) INJECTION
Status: DISCONTINUED | OUTPATIENT
Start: 2022-11-09 | End: 2022-11-09 | Stop reason: HOSPADM

## 2022-11-09 RX ORDER — LIDOCAINE HYDROCHLORIDE AND EPINEPHRINE 10; 10 MG/ML; UG/ML
INJECTION, SOLUTION INFILTRATION; PERINEURAL
Status: DISCONTINUED | OUTPATIENT
Start: 2022-11-09 | End: 2022-11-09 | Stop reason: HOSPADM

## 2022-11-09 RX ADMIN — Medication 200 MCG: at 12:11

## 2022-11-09 RX ADMIN — PROPOFOL 50 MG: 10 INJECTION, EMULSION INTRAVENOUS at 05:11

## 2022-11-09 RX ADMIN — ALBUMIN (HUMAN): 12.5 SOLUTION INTRAVENOUS at 03:11

## 2022-11-09 RX ADMIN — Medication 10 MG: at 10:11

## 2022-11-09 RX ADMIN — IOHEXOL 100 ML: 350 INJECTION, SOLUTION INTRAVENOUS at 08:11

## 2022-11-09 RX ADMIN — SODIUM CHLORIDE: 0.9 INJECTION, SOLUTION INTRAVENOUS at 08:11

## 2022-11-09 RX ADMIN — AMPICILLIN SODIUM AND SULBACTAM SODIUM 3 G: 2; 1 INJECTION, POWDER, FOR SOLUTION INTRAMUSCULAR; INTRAVENOUS at 03:11

## 2022-11-09 RX ADMIN — FENTANYL CITRATE 50 MCG: 50 INJECTION INTRAMUSCULAR; INTRAVENOUS at 08:11

## 2022-11-09 RX ADMIN — MORPHINE SULFATE 2 MG: 2 INJECTION, SOLUTION INTRAMUSCULAR; INTRAVENOUS at 11:11

## 2022-11-09 RX ADMIN — SODIUM CHLORIDE, SODIUM GLUCONATE, SODIUM ACETATE, POTASSIUM CHLORIDE, MAGNESIUM CHLORIDE, SODIUM PHOSPHATE, DIBASIC, AND POTASSIUM PHOSPHATE: .53; .5; .37; .037; .03; .012; .00082 INJECTION, SOLUTION INTRAVENOUS at 06:11

## 2022-11-09 RX ADMIN — Medication 20 MG: at 09:11

## 2022-11-09 RX ADMIN — Medication 100 MCG: at 09:11

## 2022-11-09 RX ADMIN — AMPICILLIN SODIUM AND SULBACTAM SODIUM 3 G: 2; 1 INJECTION, POWDER, FOR SOLUTION INTRAMUSCULAR; INTRAVENOUS at 09:11

## 2022-11-09 RX ADMIN — Medication 50 MCG: at 05:11

## 2022-11-09 RX ADMIN — SENNOSIDES AND DOCUSATE SODIUM 1 TABLET: 50; 8.6 TABLET ORAL at 09:11

## 2022-11-09 RX ADMIN — MUPIROCIN: 20 OINTMENT TOPICAL at 09:11

## 2022-11-09 RX ADMIN — SODIUM CHLORIDE, SODIUM GLUCONATE, SODIUM ACETATE, POTASSIUM CHLORIDE, MAGNESIUM CHLORIDE, SODIUM PHOSPHATE, DIBASIC, AND POTASSIUM PHOSPHATE: .53; .5; .37; .037; .03; .012; .00082 INJECTION, SOLUTION INTRAVENOUS at 09:11

## 2022-11-09 RX ADMIN — PROPOFOL 150 MG: 10 INJECTION, EMULSION INTRAVENOUS at 08:11

## 2022-11-09 RX ADMIN — ROCURONIUM BROMIDE 30 MG: 10 INJECTION INTRAVENOUS at 06:11

## 2022-11-09 RX ADMIN — PROPOFOL 50 MG: 10 INJECTION, EMULSION INTRAVENOUS at 08:11

## 2022-11-09 RX ADMIN — DEXAMETHASONE SODIUM PHOSPHATE 4 MG: 4 INJECTION INTRA-ARTICULAR; INTRALESIONAL; INTRAMUSCULAR; INTRAVENOUS; SOFT TISSUE at 09:11

## 2022-11-09 RX ADMIN — GLYCOPYRROLATE 0.2 MG: 0.2 INJECTION INTRAMUSCULAR; INTRAVENOUS at 01:11

## 2022-11-09 RX ADMIN — ROCURONIUM BROMIDE 20 MG: 10 INJECTION INTRAVENOUS at 12:11

## 2022-11-09 RX ADMIN — Medication 100 MCG: at 10:11

## 2022-11-09 RX ADMIN — Medication 10 MG: at 09:11

## 2022-11-09 RX ADMIN — Medication 100 MCG: at 11:11

## 2022-11-09 RX ADMIN — FENTANYL CITRATE 50 MCG: 50 INJECTION INTRAMUSCULAR; INTRAVENOUS at 09:11

## 2022-11-09 RX ADMIN — AMPICILLIN SODIUM AND SULBACTAM SODIUM 3 G: 2; 1 INJECTION, POWDER, FOR SOLUTION INTRAMUSCULAR; INTRAVENOUS at 01:11

## 2022-11-09 RX ADMIN — Medication 200 MCG: at 10:11

## 2022-11-09 RX ADMIN — CALCIUM CHLORIDE 0.5 G: 100 INJECTION, SOLUTION INTRAVENOUS at 06:11

## 2022-11-09 RX ADMIN — Medication 10 MG: at 12:11

## 2022-11-09 RX ADMIN — ACETAMINOPHEN 1000 MG: 10 INJECTION INTRAVENOUS at 02:11

## 2022-11-09 RX ADMIN — ROCURONIUM BROMIDE 30 MG: 10 INJECTION INTRAVENOUS at 10:11

## 2022-11-09 RX ADMIN — ROCURONIUM BROMIDE 50 MG: 10 INJECTION INTRAVENOUS at 08:11

## 2022-11-09 RX ADMIN — GLYCOPYRROLATE 0.2 MG: 0.2 INJECTION INTRAMUSCULAR; INTRAVENOUS at 10:11

## 2022-11-09 RX ADMIN — GABAPENTIN 300 MG: 300 CAPSULE ORAL at 09:11

## 2022-11-09 RX ADMIN — AMPICILLIN SODIUM AND SULBACTAM SODIUM 3 G: 2; 1 INJECTION, POWDER, FOR SOLUTION INTRAMUSCULAR; INTRAVENOUS at 11:11

## 2022-11-09 RX ADMIN — ONDANSETRON 4 MG: 2 INJECTION INTRAMUSCULAR; INTRAVENOUS at 09:11

## 2022-11-09 RX ADMIN — FENTANYL CITRATE 50 MCG: 50 INJECTION INTRAMUSCULAR; INTRAVENOUS at 02:11

## 2022-11-09 RX ADMIN — ROCURONIUM BROMIDE 20 MG: 10 INJECTION INTRAVENOUS at 05:11

## 2022-11-09 RX ADMIN — LIDOCAINE HYDROCHLORIDE 80 MG: 20 INJECTION, SOLUTION EPIDURAL; INFILTRATION; INTRACAUDAL; PERINEURAL at 08:11

## 2022-11-09 RX ADMIN — ROCURONIUM BROMIDE 20 MG: 10 INJECTION INTRAVENOUS at 02:11

## 2022-11-09 RX ADMIN — Medication 200 MCG: at 02:11

## 2022-11-09 RX ADMIN — ROCURONIUM BROMIDE 20 MG: 10 INJECTION INTRAVENOUS at 01:11

## 2022-11-09 RX ADMIN — CALCIUM CHLORIDE 0.5 G: 100 INJECTION, SOLUTION INTRAVENOUS at 07:11

## 2022-11-09 RX ADMIN — ROCURONIUM BROMIDE 30 MG: 10 INJECTION INTRAVENOUS at 09:11

## 2022-11-09 RX ADMIN — SUGAMMADEX 300 MG: 100 INJECTION, SOLUTION INTRAVENOUS at 07:11

## 2022-11-09 RX ADMIN — ROCURONIUM BROMIDE 30 MG: 10 INJECTION INTRAVENOUS at 03:11

## 2022-11-09 RX ADMIN — Medication 200 MCG: at 09:11

## 2022-11-09 RX ADMIN — DEXTROSE AND SODIUM CHLORIDE: 5; .45 INJECTION, SOLUTION INTRAVENOUS at 08:11

## 2022-11-09 RX ADMIN — Medication 100 MCG: at 01:11

## 2022-11-09 RX ADMIN — SODIUM CHLORIDE 0.3 MCG/KG/MIN: 9 INJECTION, SOLUTION INTRAVENOUS at 10:11

## 2022-11-09 RX ADMIN — Medication 100 MCG: at 05:11

## 2022-11-09 RX ADMIN — TRAZODONE HYDROCHLORIDE 50 MG: 50 TABLET ORAL at 09:11

## 2022-11-09 NOTE — ANESTHESIA PROCEDURE NOTES
Arterial    Diagnosis: Tongue cancer    Patient location during procedure: done in OR  Procedure start time: 11/9/2022 9:01 AM  Timeout: 11/9/2022 9:00 AM  Procedure end time: 11/9/2022 9:05 AM    Staffing  Authorizing Provider: Rhonda G Leopold, MD  Performing Provider: Renee Juan MD    Anesthesiologist was present at the time of the procedure.    Preanesthetic Checklist  Completed: patient identified, IV checked, site marked, risks and benefits discussed, surgical consent, monitors and equipment checked, pre-op evaluation, timeout performed and anesthesia consent givenArterial  Skin Prep: chlorhexidine gluconate  Local Infiltration: lidocaine  Orientation: left  Location: radial    Catheter Size: 20 G  Catheter placement by Anatomical landmarks. Heme positive aspiration all ports. Insertion Attempts: 1  Assessment  Dressing: secured with tape and tegaderm  Patient: Tolerated well

## 2022-11-09 NOTE — PROGRESS NOTES
Informed Galina CALDERON that pt is in slot 19 and needs a neck breather wristband, sign and equipment placed at bedside.

## 2022-11-09 NOTE — H&P
ENT H&P:    Oncology History   Larynx cancer   8/4/2020 Initial Diagnosis    Larynx neoplasm malignant     8/6/2020 Tumor Conference    His case was discussed at the Multidisciplinary Head and Neck Team Planning Meeting.    Representatives from Medical Oncology, Radiation Oncology, Head and Neck Surgical Oncology, Psychosocial Oncology, and Speech and Language Pathology discussed the case with the following recommendations:    1) definitive radiation              8/6/2020 Cancer Staged    Staging form: Larynx - Glottis, AJCC 8th Edition  - Clinical stage from 8/6/2020: Stage II (cT2, cN0, cM0)       8/6/2020 Cancer Staged    Cancer Staging  Larynx neoplasm malignant  Staging form: Larynx - Glottis, AJCC 8th Edition  - Clinical stage from 8/6/2020: Stage II (cT2, cN0, cM0) - Signed by Fariha Muñoz NP on 8/6/2020 12/16/2021 Tumor Conference    His case was discussed at the Multidisciplinary Head and Neck Team Planning Meeting.    Representatives from Medical Oncology, Radiation Oncology, Head and Neck Surgical Oncology, Psychosocial Oncology, and Speech and Language Pathology discussed the case with the following recommendations:    1) TL  2) oncology referral  3) psychology referral            12/27/2021 Surgery    1. DL And tracheostomy  2. PEG     1/6/2022 Surgery    1. Diagnostic direct laryngoscopy  2. Bilateral modified neck dissection of levels 2 through 4  3. Total laryngectomy  4. Total thyroidectomy with bilateral paratracheal/upper mediastinal neck dissection  5. Autotransplantation of left inferior parathyroid into left sternocleidomastoid muscle   6. Left anterolateral thigh free flap for closure of total laryngectomy neck skin defect  7. Advancement flap for closure of left thigh donor site advanced area was 25 x 10 cm     1/20/2022 Cancer Staged    Staging form: Larynx - Glottis, AJCC 8th Edition  - Pathologic stage from 1/20/2022: Stage LOU (pT4a, pN0, cM0)       5/30/2022 Cancer Staged     Staging form: Pharynx - P16 Negative Oropharynx, AJCC 8th Edition  - Clinical: Stage II (rcT2, cN0, cM0)       Primary cancer of base of tongue   5/20/2022 Cancer Staged    Staging form: Pharynx - P16 Negative Oropharynx, AJCC 8th Edition  - Clinical stage from 5/20/2022: Stage II (cT2, cN0, cM0, p16-)       6/22/2022 Initial Diagnosis    Primary cancer of base of tongue     7/11/2022 -  Chemotherapy    Treatment Summary   Plan Name: OP HEAD NECK PEMBROLIZUMAB CARBOPLATIN FLUOROURACIL Q3W FOLLOWED BY MAINTENANCE PEMBROLIZUMAB 400 MG Q6W  Treatment Goal: Palliative  Status: Active  Start Date: 7/11/2022  End Date: 6/29/2024 (Planned)  Provider: Aurash Khoobehi, MD  Chemotherapy: dexAMETHasone (DECADRON) 4 MG Tab, 8 mg, Oral, Daily, 1 of 1 cycle, Start date: 6/23/2022, End date: --  OLANZapine (ZYPREXA) 5 MG tablet, 5 mg, Oral, Nightly, 1 of 1 cycle, Start date: 6/22/2022, End date: --  CARBOplatin (PARAPLATIN) 440 mg in sodium chloride 0.9% 544 mL chemo infusion, 440 mg (100 % of original dose 438.5 mg), Intravenous, Clinic/HOD 1 time, 3 of 6 cycles  Dose modification:   (original dose 438.5 mg, Cycle 1)  Administration: 440 mg (7/11/2022), 435 mg (8/1/2022), 510 mg (8/22/2022)  pembrolizumab (KEYTRUDA) 200 mg in sodium chloride 0.9% 108 mL infusion, 200 mg, Intravenous, Clinic/HOD 1 time, 3 of 21 cycles  Administration: 200 mg (7/11/2022), 200 mg (8/1/2022), 200 mg (8/22/2022)             Interval:  Patient presents for scheduled procedure. Had CT neck/chest on arrival to reevaluate extent of lesion. Denies other signifcant changes since last evaluation.     HPI   72 y.o. male presents with the above treatment history.  He has been treated with chemotherapy and immunotherapy.  Unfortunately, his tumor has progressed.  He is entirely dependent on his feeding tube.  He is spitting out his secretions as he is having difficulty clearing them..    Review of Systems   Constitutional: Negative for fatigue and unexpected  weight change.   HENT: Per HPI.  Eyes: Negative for visual disturbance.   Respiratory: Negative for shortness of breath, hemoptysis   Cardiovascular: Negative for chest pain and palpitations.   Musculoskeletal: Negative for decreased ROM, back pain.   Skin: Negative for rash, sunburn, itching.   Neurological: Negative for dizziness and seizures.   Hematological: Negative for adenopathy. Does not bruise/bleed easily.   Endocrine: Negative for rapid weight loss/weight gain, heat/cold intolerance.     Past Medical History   Patient Active Problem List   Diagnosis    Melanocytic nevus    Body mass index (BMI) of 29.0-29.9 in adult    Benign hypertension    Overweight    Paroxysmal atrial fibrillation    Type 2 diabetes mellitus with diabetic arthropathy, with long-term current use of insulin    Fatty liver disease, nonalcoholic    False positive serological test for hepatitis C    Hyperlipidemia    Type 2 diabetes mellitus with hyperglycemia, without long-term current use of insulin    Gastroesophageal reflux disease without esophagitis    Type 2 diabetes mellitus with hyperglycemia    Vitamin B12 deficiency    Hyperuricemia    Hypovitaminosis D    Proteinuria    On enteral nutrition    Larynx cancer    Dehydration    NSVT (nonsustained ventricular tachycardia)    Hemoptysis    Adverse effect of adrenal cortical steroids, sequela    S/P laryngectomy    Hypocalcemia    Aphonia    Dysphagia, pharyngoesophageal    Acute pain of both shoulders    Bilateral arm weakness    Oral bleeding    Primary cancer of base of tongue    Advanced care planning/counseling discussion    Iron deficiency anemia due to chronic blood loss    Postoperative hypothyroidism    Pressure injury of sacral region, stage 1    Pneumatosis intestinalis    Nausea and vomiting    Neutropenia    Abnormal CT scan, neck           Past Surgical History   Past Surgical History:   Procedure Laterality Date    DIRECT LARYNGOBRONCHOSCOPY N/A 12/27/2021     Procedure: LARYNGOSCOPY, DIRECT, WITH BRONCHOSCOPY;  Surgeon: Flex Espinosa MD;  [facility];  Service: ENT;  Laterality: N/A;    DISSECTION OF NECK Bilateral 1/6/2022    Procedure: DISSECTION, NECK;  Surgeon: Jesse James MD;  [facility];  Service: ENT;  Laterality: Bilateral;    FLAP PROCEDURE Right 1/6/2022    Procedure: CREATION, FREE FLAP;  Surgeon: Alise Hart MD;  [facility];  Service: ENT;  Laterality: Right;  Ischemic start 1351  Ischemic stop 1502    INSERTION OF TUNNELED CENTRAL VENOUS CATHETER (CVC) WITH SUBCUTANEOUS PORT N/A 6/9/2022    Procedure: [id number]-[id number];  Surgeon: Jesus Viera MD;  Location: [facility];  Service: General;  Laterality: N/A;    LARYNGECTOMY N/A 1/6/2022    Procedure: LARYNGECTOMY;  Surgeon: Jesse James MD;  Location: [facility] OR [address] [place] [address];  Service: ENT;  Laterality: N/A;    LARYNGOSCOPY N/A 8/4/2020    Procedure: Suspension microlaryngoscopy with biopsy, possible KTP laser treatment/excision;  Surgeon: Stew Noel MD;  Location: [facility] OR [address] [place] [address];  Service: ENT;  Laterality: N/A;  Microscope, telescopes, tower, microinstruments, KTP laser, rep conf# [id number] IC 7/28.    LARYNGOSCOPY N/A 3/16/2021    Procedure: Suspension microlaryngoscopy with excision of lesion, possible CO2 laser;  Surgeon: Stew Noel MD;  [facility];  Service: ENT;  Laterality: N/A;  Microscope, telescopes, tower, microinstruments, CO2 laser, rep conf# [id number] IC 3/4.    LARYNGOSCOPY N/A 4/1/2021    Procedure: Suspension microlaryngoscopy with KTP laser excision of lesion;  Surgeon: Stew Noel MD;  [facility];  Service: ENT;  Laterality: N/A;  Microscope, telescopes, tower, microinstruments, 70 degree scope, vocal fold , KTP laser, rep conf# [id number] BC    LARYNGOSCOPY N/A 12/9/2021    Procedure: Suspension microlaryngoscopy with biopsy;  Surgeon: Stew Noel MD;   Location: [facility] OR 2ND FLR;  Service: ENT;  Laterality: N/A;  Microscope, telescopes, tower, microinstruments    LARYNGOSCOPY N/A 1/6/2022    Procedure: LARYNGOSCOPY;  Surgeon: Jesse James MD;  Location: [facility] OR 2ND FLR;  Service: ENT;  Laterality: N/A;    LARYNGOSCOPY N/A 4/27/2022    Procedure: LARYNGOSCOPY WITH BIOPSY;  Surgeon: Jesse James MD;  Location: [facility] OR [facility] FLR;  Service: ENT;  Laterality: N/A;    MICROLARYNGOSCOPY N/A 3/17/2020    Procedure: MICROLARYNGOSCOPY;  Surgeon: Jung Xiao MD;  Location: [facility] OR;  Service: ENT;  Laterality: N/A;  Laser Microlaryngoscopy  NEED TO SCHEDULE LASER from [employer] [id number]    REIMPLANTATION OF PARATHYROID TISSUE N/A 1/6/2022    Procedure: REIMPLANTATION, PARATHYROID TISSUE;  Surgeon: Jesse James MD;  Location: [facility] OR [facility] FLR;  Service: ENT;  Laterality: N/A;    THYROIDECTOMY  1/6/2022    Procedure: THYROIDECTOMY;  Surgeon: Jesse James MD;  [facility];  Service: ENT;;    TRACHEOSTOMY N/A 12/27/2021    Procedure: CREATION, TRACHEOSTOMY;  Surgeon: Flex Espinosa MD;  [facility];  Service: ENT;  Laterality: N/A;         Family History   Family History   Problem Relation Age of Onset    Abnormal EKG Mother     Diabetes Father     Heart disease Father     Hypertension Father            Social History   .  Social History     Socioeconomic History    Marital status:      Spouse name: Janel Batres    Number of children: 2   Occupational History    Occupation: [employer]     Employer: [employer]   Tobacco Use    Smoking status: Never    Smokeless tobacco: Never   Substance and Sexual Activity    Alcohol use: Not Currently     Comment: occasional    Drug use: No    Sexual activity: Yes     Partners: Female   Social History Narrative    2 children from his 1st wife         Allergies   Review of patient's allergies indicates:   Allergen Reactions    Pollen extracts     Lovastatin Rash     Not  confirmed but pt skeptical           Physical Exam     Vitals:    11/09/22 0657   BP: 119/65   Pulse: 82   Resp: 16   Temp: 98.4 °F (36.9 °C)           There is no height or weight on file to calculate BMI.      General: AOx3, NAD   Respiratory:  Symmetric chest rise, normal effort  Oral Cavity:  Oral Tongue mobile, no lesions noted. Hard Palate WNL. No buccal or FOM lesions.  Oropharynx:  No masses/lesions of the posterior pharyngeal wall. Tonsillar fossa without lesions. Soft palate without masses. Midline uvula.   Neck: Stoma widely patent. Skin paddle healthy with good color and turgor.Well-healed neck incisions.No LAD. Moderate post-op, post-treatment fibrosis. Submental neck diffusely full.  Face: House Brackmann I bilaterally.      CT neck/chest:   Some increase in size of FOM/BOT mass but does not violate prevertebral or carotid space.       Assessment/Plan  Problem List Items Addressed This Visit    None  Visit Diagnoses       Malignant neoplasm of base of tongue        Relevant Orders    Vital signs    Diet NPO    Place sequential compression device    Full code    Place ROBYN hose    Squamous cell carcinoma of base of tongue                Appears resectable on personal review of scan. Plan to proceed with resection including glossectomy, pharyngectomy, and free flap reconstruction.

## 2022-11-09 NOTE — HPI
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 He presents to the OR today with ENT for Pharyngectomy, glossectomy, and free flap creation. Pt being admitted directly to the SICU from the OR. Of note, per ENT pt has trach in place to avoid secretions going down into his lungs. Pt also has venous and arterial doppler wires placed within the surgical site for q1H monitoring. Also informed to expect extreme swelling in the post op period.     On arrival to the SICU, pt HDS and satting 100% via trach collar on RA. Pt received 4L of crystalloid during the case along with one unit of pRBCs. He is off of vasopressors on arrival as well. He has a left radial a-line in place for BP monitoring. He has a left leg drain from graft taken and right leg skin graft wound with surgical bandage in place. Pt also has G tube in place along with mahan for UOP monitoring

## 2022-11-09 NOTE — ANESTHESIA PROCEDURE NOTES
Intubation    Date/Time: 11/9/2022 8:56 AM  Performed by: Renee Juan MD  Authorized by: Rhonda G Leopold, MD     Intubation:     Induction:  Intravenous    Intubated:  Postinduction    Mask Ventilation:  Not attempted    Attempts:  1    Attempted By:  Resident anesthesiologist    Method of Intubation:  ETT into pre-existing tracheostomy    Difficult Airway Encountered?: No      Complications:  None    Airway Device:  Oral endotracheal tube and coil wire tube    Airway Device Size:  7.0    Style/Cuff Inflation:  Cuffed (inflated to minimal occlusive pressure)    Inflation Amount (mL):  8    Secured at:  The skin level of trach    Placement Verified By:  Capnometry    Complicating Factors:  None    Findings Post-Intubation:  BS equal bilateral

## 2022-11-10 ENCOUNTER — PATIENT MESSAGE (OUTPATIENT)
Dept: HEMATOLOGY/ONCOLOGY | Facility: CLINIC | Age: 72
End: 2022-11-10

## 2022-11-10 PROBLEM — S11.90XA OPEN WOUND OF NECK: Status: ACTIVE | Noted: 2022-11-10

## 2022-11-10 PROBLEM — S01.502A OPEN WOUND OF MOUTH: Status: ACTIVE | Noted: 2022-11-10

## 2022-11-10 PROBLEM — S71.102A OPEN WOUND OF LEFT THIGH: Status: ACTIVE | Noted: 2022-11-10

## 2022-11-10 PROBLEM — S11.20XA: Status: ACTIVE | Noted: 2022-11-10

## 2022-11-10 LAB
ALBUMIN SERPL BCP-MCNC: 3.1 G/DL (ref 3.5–5.2)
ALP SERPL-CCNC: 53 U/L (ref 55–135)
ALT SERPL W/O P-5'-P-CCNC: 8 U/L (ref 10–44)
ANION GAP SERPL CALC-SCNC: 9 MMOL/L (ref 8–16)
AST SERPL-CCNC: 16 U/L (ref 10–40)
BASOPHILS # BLD AUTO: 0.01 K/UL (ref 0–0.2)
BASOPHILS NFR BLD: 0.1 % (ref 0–1.9)
BILIRUB SERPL-MCNC: 1.6 MG/DL (ref 0.1–1)
BUN SERPL-MCNC: 20 MG/DL (ref 8–23)
CA-I BLDV-SCNC: 1.09 MMOL/L (ref 1.06–1.42)
CA-I BLDV-SCNC: 1.18 MMOL/L (ref 1.06–1.42)
CALCIUM SERPL-MCNC: 8.2 MG/DL (ref 8.7–10.5)
CHLORIDE SERPL-SCNC: 110 MMOL/L (ref 95–110)
CO2 SERPL-SCNC: 21 MMOL/L (ref 23–29)
CREAT SERPL-MCNC: 1 MG/DL (ref 0.5–1.4)
DIFFERENTIAL METHOD: ABNORMAL
EOSINOPHIL # BLD AUTO: 0 K/UL (ref 0–0.5)
EOSINOPHIL NFR BLD: 0 % (ref 0–8)
ERYTHROCYTE [DISTWIDTH] IN BLOOD BY AUTOMATED COUNT: 14.9 % (ref 11.5–14.5)
EST. GFR  (NO RACE VARIABLE): >60 ML/MIN/1.73 M^2
GLUCOSE SERPL-MCNC: 180 MG/DL (ref 70–110)
GLUCOSE SERPL-MCNC: 191 MG/DL (ref 70–110)
GLUCOSE SERPL-MCNC: 199 MG/DL (ref 70–110)
HCO3 UR-SCNC: 20.1 MMOL/L (ref 24–28)
HCO3 UR-SCNC: 21.3 MMOL/L (ref 24–28)
HCT VFR BLD AUTO: 24.5 % (ref 40–54)
HCT VFR BLD CALC: 24 %PCV (ref 36–54)
HCT VFR BLD CALC: 25 %PCV (ref 36–54)
HGB BLD-MCNC: 8.2 G/DL (ref 14–18)
IMM GRANULOCYTES # BLD AUTO: 0.04 K/UL (ref 0–0.04)
IMM GRANULOCYTES NFR BLD AUTO: 0.5 % (ref 0–0.5)
LYMPHOCYTES # BLD AUTO: 0.5 K/UL (ref 1–4.8)
LYMPHOCYTES NFR BLD: 5.2 % (ref 18–48)
MCH RBC QN AUTO: 31.5 PG (ref 27–31)
MCHC RBC AUTO-ENTMCNC: 33.5 G/DL (ref 32–36)
MCV RBC AUTO: 94 FL (ref 82–98)
MONOCYTES # BLD AUTO: 0.7 K/UL (ref 0.3–1)
MONOCYTES NFR BLD: 8.4 % (ref 4–15)
NEUTROPHILS # BLD AUTO: 7.6 K/UL (ref 1.8–7.7)
NEUTROPHILS NFR BLD: 85.8 % (ref 38–73)
NRBC BLD-RTO: 0 /100 WBC
PCO2 BLDA: 38 MMHG (ref 35–45)
PCO2 BLDA: 39.1 MMHG (ref 35–45)
PH SMN: 7.32 [PH] (ref 7.35–7.45)
PH SMN: 7.36 [PH] (ref 7.35–7.45)
PLATELET # BLD AUTO: 129 K/UL (ref 150–450)
PMV BLD AUTO: 11.1 FL (ref 9.2–12.9)
PO2 BLDA: 105 MMHG (ref 80–100)
PO2 BLDA: 111 MMHG (ref 80–100)
POC BE: -4 MMOL/L
POC BE: -6 MMOL/L
POC IONIZED CALCIUM: 1.03 MMOL/L (ref 1.06–1.42)
POC IONIZED CALCIUM: 1.08 MMOL/L (ref 1.06–1.42)
POC SATURATED O2: 98 % (ref 95–100)
POC SATURATED O2: 98 % (ref 95–100)
POC TCO2: 21 MMOL/L (ref 23–27)
POC TCO2: 22 MMOL/L (ref 23–27)
POCT GLUCOSE: 148 MG/DL (ref 70–110)
POCT GLUCOSE: 159 MG/DL (ref 70–110)
POTASSIUM BLD-SCNC: 4.5 MMOL/L (ref 3.5–5.1)
POTASSIUM BLD-SCNC: 4.7 MMOL/L (ref 3.5–5.1)
POTASSIUM SERPL-SCNC: 4.3 MMOL/L (ref 3.5–5.1)
PROT SERPL-MCNC: 5.1 G/DL (ref 6–8.4)
RBC # BLD AUTO: 2.6 M/UL (ref 4.6–6.2)
SAMPLE: ABNORMAL
SAMPLE: ABNORMAL
SODIUM BLD-SCNC: 140 MMOL/L (ref 136–145)
SODIUM BLD-SCNC: 142 MMOL/L (ref 136–145)
SODIUM SERPL-SCNC: 140 MMOL/L (ref 136–145)
WBC # BLD AUTO: 8.84 K/UL (ref 3.9–12.7)

## 2022-11-10 PROCEDURE — 27000221 HC OXYGEN, UP TO 24 HOURS

## 2022-11-10 PROCEDURE — 85025 COMPLETE CBC W/AUTO DIFF WBC: CPT | Performed by: STUDENT IN AN ORGANIZED HEALTH CARE EDUCATION/TRAINING PROGRAM

## 2022-11-10 PROCEDURE — 25000003 PHARM REV CODE 250: Performed by: STUDENT IN AN ORGANIZED HEALTH CARE EDUCATION/TRAINING PROGRAM

## 2022-11-10 PROCEDURE — 92597 ORAL SPEECH DEVICE EVAL: CPT

## 2022-11-10 PROCEDURE — 97530 THERAPEUTIC ACTIVITIES: CPT

## 2022-11-10 PROCEDURE — 99900035 HC TECH TIME PER 15 MIN (STAT)

## 2022-11-10 PROCEDURE — 20000000 HC ICU ROOM

## 2022-11-10 PROCEDURE — 25000003 PHARM REV CODE 250: Performed by: OTOLARYNGOLOGY

## 2022-11-10 PROCEDURE — 63600175 PHARM REV CODE 636 W HCPCS: Performed by: STUDENT IN AN ORGANIZED HEALTH CARE EDUCATION/TRAINING PROGRAM

## 2022-11-10 PROCEDURE — 80053 COMPREHEN METABOLIC PANEL: CPT | Performed by: STUDENT IN AN ORGANIZED HEALTH CARE EDUCATION/TRAINING PROGRAM

## 2022-11-10 PROCEDURE — 97163 PT EVAL HIGH COMPLEX 45 MIN: CPT

## 2022-11-10 PROCEDURE — 97535 SELF CARE MNGMENT TRAINING: CPT

## 2022-11-10 PROCEDURE — 25000003 PHARM REV CODE 250: Performed by: SURGERY

## 2022-11-10 PROCEDURE — 82330 ASSAY OF CALCIUM: CPT | Performed by: STUDENT IN AN ORGANIZED HEALTH CARE EDUCATION/TRAINING PROGRAM

## 2022-11-10 PROCEDURE — 25000242 PHARM REV CODE 250 ALT 637 W/ HCPCS

## 2022-11-10 PROCEDURE — 94761 N-INVAS EAR/PLS OXIMETRY MLT: CPT

## 2022-11-10 PROCEDURE — 25000242 PHARM REV CODE 250 ALT 637 W/ HCPCS: Performed by: SURGERY

## 2022-11-10 PROCEDURE — 99900026 HC AIRWAY MAINTENANCE (STAT)

## 2022-11-10 PROCEDURE — 97166 OT EVAL MOD COMPLEX 45 MIN: CPT

## 2022-11-10 PROCEDURE — S5010 5% DEXTROSE AND 0.45% SALINE: HCPCS | Performed by: STUDENT IN AN ORGANIZED HEALTH CARE EDUCATION/TRAINING PROGRAM

## 2022-11-10 PROCEDURE — 27200966 HC CLOSED SUCTION SYSTEM

## 2022-11-10 RX ORDER — OXYCODONE HCL 5 MG/5 ML
5 SOLUTION, ORAL ORAL EVERY 4 HOURS PRN
Status: DISCONTINUED | OUTPATIENT
Start: 2022-11-10 | End: 2022-11-14

## 2022-11-10 RX ORDER — ACETAMINOPHEN 650 MG/20.3ML
1000 LIQUID ORAL EVERY 8 HOURS
Status: DISPENSED | OUTPATIENT
Start: 2022-11-10 | End: 2022-11-13

## 2022-11-10 RX ORDER — OXYCODONE HCL 5 MG/5 ML
10 SOLUTION, ORAL ORAL EVERY 4 HOURS PRN
Status: DISCONTINUED | OUTPATIENT
Start: 2022-11-10 | End: 2022-11-14

## 2022-11-10 RX ORDER — MORPHINE SULFATE 2 MG/ML
2 INJECTION, SOLUTION INTRAMUSCULAR; INTRAVENOUS EVERY 4 HOURS PRN
Status: DISCONTINUED | OUTPATIENT
Start: 2022-11-10 | End: 2022-11-14

## 2022-11-10 RX ORDER — CALCIUM CARBONATE 1250 MG/5ML
1000 SUSPENSION ORAL
Status: DISCONTINUED | OUTPATIENT
Start: 2022-11-10 | End: 2022-11-20 | Stop reason: HOSPADM

## 2022-11-10 RX ORDER — OXYCODONE HYDROCHLORIDE 10 MG/1
10 TABLET ORAL EVERY 4 HOURS PRN
Status: DISCONTINUED | OUTPATIENT
Start: 2022-11-10 | End: 2022-11-10

## 2022-11-10 RX ORDER — CALCITRIOL 0.25 UG/1
0.25 CAPSULE ORAL DAILY
Status: DISCONTINUED | OUTPATIENT
Start: 2022-11-10 | End: 2022-11-14

## 2022-11-10 RX ADMIN — AMPICILLIN SODIUM AND SULBACTAM SODIUM 3 G: 2; 1 INJECTION, POWDER, FOR SOLUTION INTRAMUSCULAR; INTRAVENOUS at 09:11

## 2022-11-10 RX ADMIN — ACETAMINOPHEN 999.01 MG: 650 SOLUTION ORAL at 10:11

## 2022-11-10 RX ADMIN — OXYCODONE HYDROCHLORIDE 5 MG: 5 SOLUTION ORAL at 09:11

## 2022-11-10 RX ADMIN — GABAPENTIN 300 MG: 300 CAPSULE ORAL at 03:11

## 2022-11-10 RX ADMIN — GABAPENTIN 300 MG: 300 CAPSULE ORAL at 09:11

## 2022-11-10 RX ADMIN — LEVOTHYROXINE SODIUM 100 MCG: 100 TABLET ORAL at 06:11

## 2022-11-10 RX ADMIN — OXYCODONE HYDROCHLORIDE 10 MG: 5 SOLUTION ORAL at 05:11

## 2022-11-10 RX ADMIN — MUPIROCIN 1 TUBE: 20 OINTMENT TOPICAL at 09:11

## 2022-11-10 RX ADMIN — AMPICILLIN SODIUM AND SULBACTAM SODIUM 3 G: 2; 1 INJECTION, POWDER, FOR SOLUTION INTRAMUSCULAR; INTRAVENOUS at 04:11

## 2022-11-10 RX ADMIN — GABAPENTIN 300 MG: 300 CAPSULE ORAL at 08:11

## 2022-11-10 RX ADMIN — DEXTROSE AND SODIUM CHLORIDE: 5; .45 INJECTION, SOLUTION INTRAVENOUS at 08:11

## 2022-11-10 RX ADMIN — ONDANSETRON 4 MG: 2 INJECTION INTRAMUSCULAR; INTRAVENOUS at 08:11

## 2022-11-10 RX ADMIN — CALCITRIOL CAPSULES 0.25 MCG 0.25 MCG: 0.25 CAPSULE ORAL at 08:11

## 2022-11-10 RX ADMIN — SENNOSIDES AND DOCUSATE SODIUM 1 TABLET: 50; 8.6 TABLET ORAL at 08:11

## 2022-11-10 RX ADMIN — MUPIROCIN: 20 OINTMENT TOPICAL at 09:11

## 2022-11-10 RX ADMIN — ACETAMINOPHEN 999.01 MG: 650 SOLUTION ORAL at 09:11

## 2022-11-10 RX ADMIN — SENNOSIDES AND DOCUSATE SODIUM 1 TABLET: 50; 8.6 TABLET ORAL at 09:11

## 2022-11-10 RX ADMIN — AMPICILLIN SODIUM AND SULBACTAM SODIUM 3 G: 2; 1 INJECTION, POWDER, FOR SOLUTION INTRAMUSCULAR; INTRAVENOUS at 03:11

## 2022-11-10 RX ADMIN — CALCIUM CARBONATE 1000 MG: 1250 SUSPENSION ORAL at 12:11

## 2022-11-10 RX ADMIN — ONDANSETRON 4 MG: 2 INJECTION INTRAMUSCULAR; INTRAVENOUS at 09:11

## 2022-11-10 RX ADMIN — TRAZODONE HYDROCHLORIDE 50 MG: 50 TABLET ORAL at 09:11

## 2022-11-10 RX ADMIN — CALCIUM CARBONATE 1000 MG: 1250 SUSPENSION ORAL at 04:11

## 2022-11-10 RX ADMIN — ENOXAPARIN SODIUM 40 MG: 100 INJECTION SUBCUTANEOUS at 04:11

## 2022-11-10 NOTE — ANESTHESIA POSTPROCEDURE EVALUATION
Anesthesia Post Evaluation    Patient: Cyrus Batres Jr.    Procedure(s) Performed: Procedure(s) (LRB):  TOTAL PHARYNGECTOMY  TOTAL GLOSSECTOMY (Bilateral)  CREATION, FREE FLAP, ALT (Left)  LARYNGOSCOPY, DIRECT  DISSECTION, NECK (Bilateral)  APPLICATION, GRAFT, SKIN, SPLIT-THICKNESS (Right)    Final Anesthesia Type: general      Patient location during evaluation: ICU  Patient participation: No - Unable to Participate, Sedation  Level of consciousness: sedated  Post-procedure vital signs: reviewed and stable  Pain management: adequate  Airway patency: patent    PONV status at discharge: No PONV  Anesthetic complications: no      Cardiovascular status: blood pressure returned to baseline and hemodynamically stable  Respiratory status: room air and T-piece  Hydration status: euvolemic  Follow-up not needed.          Vitals Value Taken Time   /57 11/10/22 1002   Temp 36.9 °C (98.4 °F) 11/10/22 0700   Pulse 77 11/10/22 1037   Resp 14 11/10/22 1037   SpO2 100 % 11/10/22 1037   Vitals shown include unvalidated device data.      No case tracking events are documented in the log.      Pain/Alexi Score: Pain Rating Prior to Med Admin: 0 (11/10/2022 10:35 AM)  Pain Rating Post Med Admin: 0 (11/9/2022 11:53 PM)

## 2022-11-10 NOTE — TRANSFER OF CARE
Anesthesia Transfer of Care Note    Patient: Cyrus Batres Jr.    Procedure(s) Performed: Procedure(s) (LRB):  TOTAL PHARYNGECTOMY  TOTAL GLOSSECTOMY (Bilateral)  CREATION, FREE FLAP, ALT (Left)  LARYNGOSCOPY, DIRECT  DISSECTION, NECK (Bilateral)  APPLICATION, GRAFT, SKIN, SPLIT-THICKNESS (Right)    Patient location: ICU    Anesthesia Type: general    Transport from OR: Continuous ECG monitoring in transport. Continuous SpO2 monitoring in transport. Transported from OR on 6-10 L/min O2 by face mask with adequate spontaneous ventilation    Post pain: adequate analgesia    Post assessment: no apparent anesthetic complications and tolerated procedure well    Post vital signs: stable    Level of consciousness: awake, alert and oriented    Nausea/Vomiting: no nausea/vomiting    Complications: none    Transfer of care protocol was followed      Last vitals:   Visit Vitals  /65   Pulse 82   Temp 36.9 °C (98.4 °F)   Resp 16   SpO2 100%

## 2022-11-10 NOTE — ASSESSMENT & PLAN NOTE
  Neuro/Psych:   -- Sedation: None   -- Pain: Adequately controlled on arrival to the SICU. Gabapentin. PRN Norco and morphine.   -- Trazodone QHS              Cards:   -- HDS  -- left radial a-line in place      Pulm:   -- Goal O2 sat > 90%  -- Wean as able  -- pt on trach collar       Renal:  -- Keep mahan for strict I/O  -- BUN/Cr in post op labs  -- Daily CMP, Mag, Phos   -- F/U post op calcium as per ENT may have derangements as much of pts neck was dissected and removed during surgery.       FEN / GI:   -- D5 1/2 NS 100cc/hr   -- Replace lytes as needed  -- Nutrition: NPO  -- G tube in place, ok to give meds through G tube for now but will remain NPO      ID:   -- Tm: afebrile; f/u WBC in post op labs    -- Abx Unasyn post op      Heme/Onc:   -- F/U H/H in post labs   -- Daily CBC      Endo:   -- Gluc goal 140-180  -- SSI      PPx:   Feeding: NPO  Analgesia/Sedation: gabapentin, PRN norco and morphine   Thromboembolic prevention: lovenox  HOB >30: yes  Stress Ulcer ppx: N/A  Glucose control: Critical care goal 140-180 g/dl, ISS    Lines/Drains/Airway: Left leg drain, G tube, mahan, left radial a-line, neck penrose drains bl      Dispo/Code Status/Palliative:   -- Admit to SICU / Full Code  -- Discussed with staff Dr. Rios

## 2022-11-10 NOTE — PLAN OF CARE
ENT 6 Hour Post-Anastamosis Flap Check     Post-op events: No acute events. Pt with minimal swelling.   Flap check performed approx 6h postop shortly after midnight.      Flap Site  Color: appropriate   Cap refill: well perfused  Turgor: soft; No appreciable fluid collection in neck.   Temperature: warm  Doppler:  + triphasic artery signal + venous signal     Minimal pooled intraoral secretions.   Skin graft recipient site soft with appropriate drainage from wound.      Donor Site  Warm, soft. No concern for hematoma.     Continue Q1hr flap checks.    Ronaldo Hyatt MD  Otolaryngology - Head and Neck Surgery  11/10/2022

## 2022-11-10 NOTE — H&P
[facility] - [facility]  Critical Care - Surgery  History & Physical    Patient Name: Cyrus Batres Jr.  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 He presents to the OR today with ENT for Pharyngectomy, glossectomy, and free flap creation. Pt being admitted directly to the SICU from the OR. Of note, per ENT pt has trach in place to avoid secretions going down into his lungs. Pt also has venous and arterial doppler wires placed within the surgical site for q1H monitoring. Also informed to expect extreme swelling in the post op period.     On arrival to the SICU, pt HDS and satting 100% via trach collar on RA. Pt received 4L of crystalloid during the case along with one unit of pRBCs. He is off of vasopressors on arrival as well. He has a left radial a-line in place for BP monitoring. He has a left leg drain from graft taken and right leg skin graft wound with surgical bandage in place. Pt also has G tube in place along with mahan for UOP monitoring      Hospital/ICU Course:  No notes on file    Follow-up For: Procedure(s) (LRB):  TOTAL PHARYNGECTOMY  TOTAL GLOSSECTOMY (Bilateral)  CREATION, FREE FLAP, ALT (Left)  LARYNGOSCOPY, DIRECT  DISSECTION, NECK (Bilateral)  APPLICATION, GRAFT, SKIN, SPLIT-THICKNESS (Right)    Post-Operative Day: Day of Surgery     Past Medical History:   Diagnosis Date    Abnormal CT scan, neck 09/22/2022    Allergy     pollen extracts    Atrial fibrillation     Chronic anticoagulation     Diabetes mellitus, type 2     Hypertension     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    Postoperative 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    Tongue  cancer        Past Surgical History:   Procedure Laterality Date    DIRECT LARYNGOBRONCHOSCOPY N/A 12/27/2021    Procedure: LARYNGOSCOPY, DIRECT, WITH BRONCHOSCOPY;  Surgeon: Flex Espinosa MD;  Location: [facility] OR [facility];  Service: ENT;  Laterality: N/A;    DISSECTION OF NECK Bilateral 1/6/2022    Procedure: DISSECTION, NECK;  Surgeon: Jesse James MD;  [facility];  Service: ENT;  Laterality: Bilateral;    FLAP PROCEDURE Right 1/6/2022    Procedure: CREATION, FREE FLAP;  Surgeon: Alise Hart MD;  [facility];  Service: ENT;  Laterality: Right;  Ischemic start 1351  Ischemic stop 1502    INSERTION OF TUNNELED CENTRAL VENOUS CATHETER (CVC) WITH SUBCUTANEOUS PORT N/A 6/9/2022    Procedure: [id number]-[id number];  Surgeon: Jesus Viera MD;  Location: [facility] OR;  Service: General;  Laterality: N/A;    LARYNGECTOMY N/A 1/6/2022    Procedure: LARYNGECTOMY;  Surgeon: Jesse James MD;  [facility];  Service: ENT;  Laterality: N/A;    LARYNGOSCOPY N/A 8/4/2020    Procedure: Suspension microlaryngoscopy with biopsy, possible KTP laser treatment/excision;  Surgeon: Stew Noel MD;  [facility];  Service: ENT;  Laterality: N/A;  Microscope, telescopes, tower, microinstruments, KTP laser, rep conf# [id number] IC 7/28.    LARYNGOSCOPY N/A 3/16/2021    Procedure: Suspension microlaryngoscopy with excision of lesion, possible CO2 laser;  Surgeon: Stew Noel MD;  [facility];  Service: ENT;  Laterality: N/A;  Microscope, telescopes, tower, microinstruments, CO2 laser, rep conf# [id number] IC 3/4.    LARYNGOSCOPY N/A 4/1/2021    Procedure: Suspension microlaryngoscopy with KTP laser excision of lesion;  Surgeon: Stew Noel MD;  [facility];  Service: ENT;  Laterality: N/A;  Microscope, telescopes, tower, microinstruments, 70 degree scope, vocal fold , KTP laser, rep conf# [id number] BC    LARYNGOSCOPY  N/A 12/9/2021    Procedure: Suspension microlaryngoscopy with biopsy;  Surgeon: Stew Noel MD;  Location: [facility] OR [facility];  Service: ENT;  Laterality: N/A;  Microscope, telescopes, tower, microinstruments    LARYNGOSCOPY N/A 1/6/2022    Procedure: LARYNGOSCOPY;  Surgeon: Jesse James MD;  [facility];  Service: ENT;  Laterality: N/A;    LARYNGOSCOPY N/A 4/27/2022    Procedure: LARYNGOSCOPY WITH BIOPSY;  Surgeon: Jesse James MD;  [facility];  Service: ENT;  Laterality: N/A;    MICROLARYNGOSCOPY N/A 3/17/2020    Procedure: MICROLARYNGOSCOPY;  Surgeon: Jung Xiao MD;  Location: [facility];  Service: ENT;  Laterality: N/A;  Laser Microlaryngoscopy  NEED TO SCHEDULE LASER from [facility] [id number]    REIMPLANTATION OF PARATHYROID TISSUE N/A 1/6/2022    Procedure: REIMPLANTATION, PARATHYROID TISSUE;  Surgeon: Jesse James MD;  [facility];  Service: ENT;  Laterality: N/A;    THYROIDECTOMY  1/6/2022    Procedure: THYROIDECTOMY;  Surgeon: Jesse James MD;  [facility];  Service: ENT;;    TRACHEOSTOMY N/A 12/27/2021    Procedure: CREATION, TRACHEOSTOMY;  Surgeon: Flex Espinosa MD;  [facility];  Service: ENT;  Laterality: N/A;       Review of patient's allergies indicates:   Allergen Reactions    Pollen extracts     Lovastatin Rash     Not confirmed but pt skeptical       Family History       Problem Relation (Age of Onset)    Abnormal EKG Mother    Diabetes Father    Heart disease Father    Hypertension Father          Tobacco Use    Smoking status: Never    Smokeless tobacco: Never   Substance and Sexual Activity    Alcohol use: Not Currently     Comment: occasional    Drug use: No    Sexual activity: Yes     Partners: Female      Review of Systems   Constitutional:  Negative for fatigue and fever.   HENT:  Positive for facial swelling. Negative for sore throat.         Pt denying any neck pain near  surgical site    Eyes: Negative.    Respiratory:  Negative for chest tightness, shortness of breath and wheezing.    Cardiovascular: Negative.    Gastrointestinal: Negative.    Endocrine: Negative.    Musculoskeletal: Negative.    Neurological: Negative.    Hematological: Negative.    Psychiatric/Behavioral: Negative.     Objective:     Vital Signs (Most Recent):  Temp: 98.2 °F (36.8 °C) (11/09/22 2015)  Pulse: 78 (11/09/22 2030)  Resp: 16 (11/09/22 2030)  BP: 119/65 (11/09/22 0657)  SpO2: 100 % (11/09/22 2030) Vital Signs (24h Range):  Temp:  [98.2 °F (36.8 °C)-98.4 °F (36.9 °C)] 98.2 °F (36.8 °C)  Pulse:  [78-82] 78  Resp:  [16] 16  SpO2:  [100 %] 100 %  BP: (119)/(65) 119/65  Arterial Line BP: (106)/(51) 106/51        There is no height or weight on file to calculate BMI.      Intake/Output Summary (Last 24 hours) at 11/9/2022 2055  Last data filed at 11/9/2022 2040  Gross per 24 hour   Intake 6100 ml   Output 1910 ml   Net 4190 ml       Physical Exam  Constitutional:       General: He is not in acute distress.     Appearance: He is ill-appearing.   HENT:      Head:      Comments: Pt has large neck incision extending across neck up below the ears bl. Skin graft in place in central neck with appropriate color in immediate post op period. Pt has doppler wires placed in surgical wound extending out on the right side. Appropriate post op swelling.      Mouth/Throat:      Comments: Surgical wound and graft visible in posterior oral cavity.   Cardiovascular:      Rate and Rhythm: Normal rate and regular rhythm.      Pulses: Normal pulses.      Heart sounds: Normal heart sounds. No murmur heard.    No friction rub. No gallop.   Pulmonary:      Effort: Pulmonary effort is normal. No respiratory distress.      Breath sounds: Normal breath sounds. No wheezing or rales.   Abdominal:      General: Abdomen is flat. There is no distension.      Palpations: Abdomen is soft.   Genitourinary:     Comments: Juarez catheter    Musculoskeletal:         General: No swelling.   Skin:     General: Skin is warm and dry.      Capillary Refill: Capillary refill takes less than 2 seconds.      Comments: Left sided surgical wound with drain from graft recovery. Right sided skin graft with surgical bandage and appropriate minor bleeding.    Neurological:      General: No focal deficit present.      Mental Status: He is alert and oriented to person, place, and time. Mental status is at baseline.      Cranial Nerves: No cranial nerve deficit.      Motor: No weakness.   Psychiatric:         Mood and Affect: Mood normal.         Behavior: Behavior normal.         Thought Content: Thought content normal.         Judgment: Judgment normal.       Vents:  Oxygen Concentration (%): 28 (11/09/22 2030)    Lines/Drains/Airways       Central Venous Catheter Line  Duration             Port A Cath Single Lumen left subclavian -- days              Drain  Duration                  Gastrostomy/Enterostomy 12/27/21 1152 Percutaneous endoscopic gastrostomy (PEG)  days         Gastrostomy/Enterostomy 06/09/22 1935 midline feeding 153 days         Closed/Suction Drain 11/09/22 1518 Left;Anterior Thigh Bulb 19 Fr. <1 day         Urethral Catheter 11/09/22 0926 Non-latex 16 Fr. <1 day              Airway  Duration                  Laryngectomy (Do Not Remove) 06/09/22 1414 Laryngectomy stoma 153 days         Airway - Non-Surgical 11/09/22 0856 Coil Wire Tube <1 day              Arterial Line  Duration             Arterial Line 11/09/22 0901 Left Radial <1 day              Peripheral Intravenous Line  Duration                  Peripheral IV - Single Lumen 11/09/22 0726 18 G Left Hand <1 day         Peripheral IV - Single Lumen 11/09/22 2041 20 G Right Antecubital <1 day                    Significant Labs:    CBC/Anemia Profile:  Recent Labs   Lab 11/09/22 2031   WBC 11.28   HGB 9.1*   HCT 26.8*      MCV 95   RDW 14.6*        Chemistries:  No results for  input(s): NA, K, CL, CO2, BUN, CREATININE, CALCIUM, ALBUMIN, PROT, BILITOT, ALKPHOS, ALT, AST, GLUCOSE, MG, PHOS in the last 48 hours.    All pertinent labs within the past 24 hours have been reviewed.    Significant Imaging: I have reviewed all pertinent imaging results/findings within the past 24 hours.  Assessment/Plan:     * Malignant neoplasm of base of tongue    Neuro/Psych:   -- Sedation: None   -- Pain: Adequately controlled on arrival to the SICU. Gabapentin. PRN Norco and morphine.   -- Trazodone QHS              Cards:   -- HDS  -- left radial a-line in place      Pulm:   -- Goal O2 sat > 90%  -- Wean as able  -- pt on trach collar       Renal:  -- Keep mahan for strict I/O  -- BUN/Cr in post op labs  -- Daily CMP, Mag, Phos   -- F/U post op calcium as per ENT may have derangements as much of pts neck was dissected and removed during surgery.       FEN / GI:   -- D5 1/2 NS 100cc/hr   -- Replace lytes as needed  -- Nutrition: NPO  -- G tube in place, ok to give meds through G tube for now but will remain NPO      ID:   -- Tm: afebrile; f/u WBC in post op labs    -- Abx Unasyn post op      Heme/Onc:   -- F/U H/H in post labs   -- Daily CBC      Endo:   -- Gluc goal 140-180  -- SSI      PPx:   Feeding: NPO  Analgesia/Sedation: gabapentin, PRN norco and morphine   Thromboembolic prevention: lovenox  HOB >30: yes  Stress Ulcer ppx: N/A  Glucose control: Critical care goal 140-180 g/dl, ISS    Lines/Drains/Airway: Left leg drain, G tube, mahan, left radial a-line, neck penrose drains bl      Dispo/Code Status/Palliative:   -- Admit to SICU / Full Code  -- Discussed with staff Dr. Rios           Critical Care Daily Checklist:    A: Awake: RASS Goal/Actual Goal: RASS Goal: 0-->alert and calm  Actual:     B: Spontaneous Breathing Trial Performed?     C: SAT & SBT Coordinated?  N?A                      D: Delirium: CAM-ICU     E: Early Mobility Performed? No   F: Feeding Goal:    Status:     Current Diet Order    Procedures    Diet NPO      AS: Analgesia/Sedation Gabapentin, PRN morphine and Norco   T: Thromboembolic Prophylaxis Lovenox   H: HOB > 300 Yes   U: Stress Ulcer Prophylaxis (if needed) None   G: Glucose Control POST glucose checks    B: Bowel Function     I: Indwelling Catheter (Lines & Juarez) Necessity Juarez, left radial a-line, left leg drain, G tube, neck penrose drains bl   D: De-escalation of Antimicrobials/Pharmacotherapies Unasyn post op    Plan for the day/ETD Admit to SICU    Code Status:  Family/Goals of Care: Prior         Critical secondary to Patient has a condition that poses threat to life and bodily function: Post Op care after Pharyngectomy why ENT     Critical care was time spent personally by me on the following activities: development of treatment plan with patient or surrogate and bedside caregivers, discussions with consultants, evaluation of patient's response to treatment, examination of patient, ordering and performing treatments and interventions, ordering and review of laboratory studies, ordering and review of radiographic studies, pulse oximetry, re-evaluation of patient's condition.  This critical care time did not overlap with that of any other provider or involve time for any procedures.     Susan Oleary MD  Critical Care - Surgery  [facility] - [facility]

## 2022-11-10 NOTE — BRIEF OP NOTE
[facility] - Surgical Intensive Care  Brief Operative Note    SUMMARY     Surgery Date: 11/9/2022     Surgeon(s) and Role:  Panel 1:     * Jesse James MD - Primary  Panel 2:     * Alise Hart MD - Primary     * Ata Mcclellan MD - Resident - Assisting     * Jo Ann Bryant MD - Resident - Assisting        Pre-op Diagnosis:  Malignant neoplasm of base of tongue [C01]    Post-op Diagnosis:  Post-Op Diagnosis Codes:     * Malignant neoplasm of base of tongue [C01]    Procedure(s) (LRB):  TOTAL PHARYNGECTOMY  TOTAL GLOSSECTOMY (Bilateral)  CREATION, FREE FLAP, ALT (Left)  LARYNGOSCOPY, DIRECT  DISSECTION, NECK (Bilateral)  APPLICATION, GRAFT, SKIN, SPLIT-THICKNESS (Right)    Anesthesia: General    Operative Findings: See op note    Estimated Blood Loss: 800 mL    Estimated Blood Loss has been documented.         Specimens:   Specimen (24h ago, onward)       Start     Ordered    11/09/22 1420  Specimen to Pathology, Surgery ENT  Once        Comments: Pre-op Diagnosis: Malignant neoplasm of base of tongue [C01]Procedure(s):PHARYNGECTOMYGLOSSECTOMYCREATION, FREE FLAPLARYNGOSCOPY, DIRECT Number of specimens: 2Name of specimens: 1. left submandibular lymph node-perm2. total pharyngectomy, total glossectomy-perm     References:    Click here for ordering Quick Tip   Question Answer Comment   Procedure Type: ENT    Specimen Class: Known or suspected malignancy    Which provider would you like to cc? JESSE JAMES    Release to patient Immediate        11/09/22 1420                    [id number]

## 2022-11-10 NOTE — PROGRESS NOTES
[facility] - Surgical Intensive Care  Otorhinolaryngology-Head & Neck Surgery  Progress Note    Subjective:     Post-Op Info:  Procedure(s) (LRB):  TOTAL PHARYNGECTOMY  TOTAL GLOSSECTOMY (Bilateral)  CREATION, FREE FLAP, ALT (Left)  LARYNGOSCOPY, DIRECT  DISSECTION, NECK (Bilateral)  APPLICATION, GRAFT, SKIN, SPLIT-THICKNESS (Right)   1 Day Post-Op  Hospital Day: 2     Interval History: POD1 s/p total glossectomy and pharyngectomy. Postop PTH low but iCal WNL. Facial swelling overnight. Postop flap check WNL.     Medications:  Continuous Infusions:   dextrose 5 % and 0.45 % NaCl 100 mL/hr at 11/10/22 0600     Scheduled Meds:   ampicillin-sulbactim (UNASYN) IVPB  3 g Intravenous Q6H    calcitRIOL  0.25 mcg Per G Tube Daily    calcium carbonate  1,000 mg Per G Tube TID WM    enoxaparin  40 mg Subcutaneous Daily    gabapentin  300 mg Per G Tube TID    levothyroxine  100 mcg Per G Tube Before breakfast    losartan  50 mg Per G Tube Daily    mupirocin   Nasal BID    ondansetron  4 mg Intravenous Q12H    senna-docusate 8.6-50 mg  1 tablet Per G Tube BID    traZODone  50 mg Per G Tube QHS     PRN Meds:dextrose 10%, dextrose 10%, glucagon (human recombinant), HYDROcodone-acetaminophen, insulin aspart U-100, morphine, naloxone, ondansetron, prochlorperazine, sodium chloride 0.9%     Review of patient's allergies indicates:   Allergen Reactions    Pollen extracts     Lovastatin Rash     Not confirmed but pt skeptical     Objective:     Vital Signs (24h Range):  Temp:  [98.2 °F (36.8 °C)-98.4 °F (36.9 °C)] 98.3 °F (36.8 °C)  Pulse:  [69-89] 81  Resp:  [12-19] 19  SpO2:  [100 %] 100 %  BP: (102-121)/(54-65) 114/56  Arterial Line BP: ()/(43-81) 106/58       Lines/Drains/Airways       Central Venous Catheter Line  Duration             Port A Cath Single Lumen left subclavian -- days              Drain  Duration                  Gastrostomy/Enterostomy 12/27/21 1152 Percutaneous endoscopic gastrostomy (PEG)  days          Gastrostomy/Enterostomy 06/09/22 1935 midline feeding 153 days         Closed/Suction Drain 11/09/22 1518 Left;Anterior Thigh Bulb 19 Fr. <1 day         Urethral Catheter 11/09/22 0926 Non-latex 16 Fr. <1 day              Airway  Duration                  Surgical Airway Shiley Cuffed -- days         Laryngectomy (Do Not Remove) 06/09/22 1414 Laryngectomy stoma 153 days         Airway - Non-Surgical 11/09/22 0856 Coil Wire Tube <1 day              Arterial Line  Duration             Arterial Line 11/09/22 0901 Left Radial <1 day              Peripheral Intravenous Line  Duration                  Peripheral IV - Single Lumen 11/09/22 0726 18 G Left Hand <1 day         Peripheral IV - Single Lumen 11/09/22 2041 20 G Right Antecubital <1 day                    Physical Exam  Constitutional:       General: He is not in acute distress.  HENT:      Head:      Comments: Diffuse facial edema bilaterall, soft to palpation      Mouth/Throat:      Comments: Flap soft to palpation, sutures to FOM intact   Eyes:      Extraocular Movements: Extraocular movements intact.   Neck:      Comments: Trach in place to stoma   Skin graft to midline neck overlying flap, appropriate drainage  Staples c/d/I on lateral neck, penroses in place bilaterally   Doppler with good venous and arterial signals   Cardiovascular:      Rate and Rhythm: Normal rate and regular rhythm.   Pulmonary:      Effort: No respiratory distress.   Neurological:      Mental Status: He is alert.       Significant Labs:  CBC:   Recent Labs   Lab 11/10/22  0330   WBC 8.84   RBC 2.60*   HGB 8.2*   HCT 24.5*   *   MCV 94   MCH 31.5*   MCHC 33.5     CMP:   Recent Labs   Lab 11/10/22  0330   *   CALCIUM 8.2*   ALBUMIN 3.1*   PROT 5.1*      K 4.3   CO2 21*      BUN 20   CREATININE 1.0   ALKPHOS 53*   ALT 8*   AST 16   BILITOT 1.6*       Significant Diagnostics:  X-Ray: I have reviewed all pertinent results/findings within the past 24 hours and my  "personal findings are:  CXR WNL    Assessment/Plan:     * Malignant neoplasm of base of tongue  72 y.o M with hx of glottic SCC s/p TL in 1/2022, with development of BOT SCC s/p XRT with persistent disease. Now s/p total glossectomy, pharyngectomy, and ALT free flap and STSG reconstruction on 11/9.        ENT/HNS:  - Q1H flap checks  - No neck ties   - Will exchange tracheostomy tube for mp tube today   - SLP evaluation  - Bacitracin ointemnt to incision sites   -Sign above bed + outside door stating patient is "neck breather" and "can not be intubated by mouth"  -Clean laryngectomy stoma as needed, or daily  -Fresh wound care, clean incision with peroxide/qtip followed by bacitracin BID  -Humidified O2 via trach collar     Neuro:   -Pain: Multimodality PRN regimen initiated     Cardiac:  -Remain normotensive  -Page ENT before starting vasopressors  - D/C a-line   - H/O of paroxysmal a fib, will CTM      Pulmonary:   - Postoperative CXR WNL   - PRN breathing treatments  - Humidified air per trach collar     Renal:   - monitor Is and Os  - Cr stable  - OK to remove mahan      Infectious Disease:  - Daily CBC  - Continue unasyn; 1/4     Hematology/Oncology:  - H/H stable  - Lovenox DVT prophylaxis      FEN/GI  - Postoperative hypoparathyroidism, iCal WNL    - Will consider endocrinology consultation   - Replace electrolytes as needed to keep K>4, Mg>2  - Bowel reg  - Protonix for GI prophylaxis  - Nausea control ondansetron, phenergan  - Initiate trickle TFs   - Continue maintenance IVF     Endo:  - History of DM2   - Sliding scale insulin, CTM      MSK  - Out of bed as tolerated  - Drain care   - Keep island dressing in place      Dispo:  -Continue ICU care          Jo Ann Bryant MD  Otorhinolaryngology-Head & Neck Surgery  [facility] - Surgical Intensive Care  "

## 2022-11-10 NOTE — PT/OT/SLP EVAL
Occupational Therapy   Co-Evaluation/Treatment    Name: Cyrus Batres Jr.  MRN: [id number]  Admitting Diagnosis:  Malignant neoplasm of base of tongue  Recent Surgery: Procedure(s) (LRB):  TOTAL PHARYNGECTOMY  TOTAL GLOSSECTOMY (Bilateral)  CREATION, FREE FLAP, ALT (Left)  LARYNGOSCOPY, DIRECT  DISSECTION, NECK (Bilateral)  APPLICATION, GRAFT, SKIN, SPLIT-THICKNESS (Right) 1 Day Post-Op    Recommendations:     Discharge Recommendations: home health OT  Discharge Equipment Recommendations:   (TBD)  Barriers to discharge:  None    Assessment:     Cyrus Batres Jr. is a 72 y.o. male with a medical diagnosis of Malignant neoplasm of base of tongue.  He presents with good motivation and participation. Pt tolerated session well, limited by increased neck drainage upon sitting EOB. He is performing functional tasks below baseline.  Performance deficits affecting function: weakness, impaired endurance, impaired self care skills, impaired functional mobility, gait instability, impaired balance, decreased upper extremity function, decreased lower extremity function.  Pt would benefit from skilled OT services in order to maximize independence with ADLs and facilitate safe discharge. Pt would benefit from home health OT once medically stable for discharge.     Rehab Prognosis: Good; patient would benefit from acute skilled OT services to address these deficits and reach maximum level of function.       Plan:     Patient to be seen 3 x/week to address the above listed problems via self-care/home management, therapeutic activities, therapeutic exercises, neuromuscular re-education  Plan of Care Expires: 12/10/22  Plan of Care Reviewed with: patient    Subjective     Chief Complaint: pt pleasant and eager to participate  Patient/Family Comments/goals: to return home    Occupational Profile:  Living Environment: Pt and his wife live in a [facility] with [facility]. He has a tub/shower   Previous level of function: independent  Roles and  Routines: does not work and drives  Equipment Used at Home:  none  Assistance upon Discharge: Upon discharge, pt will have assist from assist from wife    Pain/Comfort:  Pain Rating 1: 2/10  Location 1: neck  Pain Addressed 1: Reposition    Patients cultural, spiritual, Hoahaoism conflicts given the current situation: no    Objective:     Communicated with: RN and PT prior to session.  Patient found HOB elevated with telemetry, pulse ox (continuous), peripheral IV, Tracheostomy, oxygen, mahan catheter, SCD (external doppler) upon OT entry to room.    General Precautions: Standard, NPO, fall   Orthopedic Precautions:Full weight bearing   Braces: N/A  Respiratory Status:  trach collar    Occupational Performance:    Bed Mobility:    Patient completed Supine to Sit with stand by assistance  Patient completed Sit to Supine with stand by assistance  Pt with increased neck drainage upon sitting EOB, michelle pads changed and pt returned supine    Functional Mobility/Transfers:  NT    Activities of Daily Living:  Grooming: stand by assistance to perform facial hygiene    Cognitive/Visual Perceptual:  Cognitive/Psychosocial Skills:     -       Oriented to: Person, Place, Time, and Situation   -       Follows Commands/attention:Follows multistep  commands  -       Communication: nonverbal; responds appropriate to yes/no questions  -       Memory: No Deficits noted  -       Safety awareness/insight to disability: intact   -       Mood/Affect/Coping skills/emotional control: Appropriate to situation    Physical Exam:  Upper Extremity Range of Motion:     -       Right Upper Extremity: WFL  -       Left Upper Extremity: WFL  Upper Extremity Strength:    -       Right Upper Extremity: WFL  -       Left Upper Extremity: WFL   Strength:    -       Right Upper Extremity: WFL  -       Left Upper Extremity: WFL  Fine Motor Coordination:    -       Intact    AMPAC 6 Click ADL:  AMPAC Total Score: 9    Treatment &  Education:  -Therapist provided facilitation and instruction of proper body mechanics, energy conservation, and fall prevention strategies during tasks listed above.  -Pt educated on role of OT, POC and goals for therapy  -Pt educated on importance of OOB activities with staff member assistance   -Pt verbalized understanding. Pt expressed no further concerns/questions  -Whiteboard updated   Evaluation completed with PT to best establish plan of care for acute setting.     Patient left HOB elevated with all lines intact, call button in reach, and Rn notified    GOALS:   Multidisciplinary Problems       Occupational Therapy Goals          Problem: Occupational Therapy    Goal Priority Disciplines Outcome Interventions   Occupational Therapy Goal     OT, PT/OT Ongoing, Progressing    Description: Goals to be met by: 11/24/22     Patient will increase functional independence with ADLs by performing:    UE Dressing with [place].  LE Dressing with Stand-by Assistance.  Grooming while standing at sink with Supervision.  Toileting from toilet with Supervision for hygiene and clothing management.   Toilet transfer to toilet with Supervision.                         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    Postoperative 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    Tongue cancer          Past Surgical History:   Procedure Laterality Date    DIRECT LARYNGOBRONCHOSCOPY N/A 12/27/2021    Procedure: LARYNGOSCOPY, DIRECT, WITH BRONCHOSCOPY;  Surgeon: Flex Espinosa MD;  Location: [facility] OR [address] [place] [address];  Service: ENT;  Laterality: N/A;    DIRECT LARYNGOSCOPY  11/9/2022    Procedure: LARYNGOSCOPY, DIRECT;  Surgeon: Jesse James MD;  Location: [facility] OR [address] [place] [address];  Service: ENT;;    DISSECTION OF NECK Bilateral 1/6/2022     Procedure: DISSECTION, NECK;  Surgeon: Jesse James MD;  [facility];  Service: ENT;  Laterality: Bilateral;    DISSECTION OF NECK Bilateral 11/9/2022    Procedure: DISSECTION, NECK;  Surgeon: Jesse James MD;  [facility];  Service: ENT;  Laterality: Bilateral;    FLAP PROCEDURE Right 1/6/2022    Procedure: CREATION, FREE FLAP;  Surgeon: Alise Hart MD;  [facility];  Service: ENT;  Laterality: Right;  Ischemic start 1351  Ischemic stop 1502    FLAP PROCEDURE Left 11/9/2022    Procedure: CREATION, FREE FLAP, ALT;  Surgeon: Alise Hart MD;  [facility];  Service: ENT;  Laterality: Left;    GLOSSECTOMY Bilateral 11/9/2022    Procedure: TOTAL GLOSSECTOMY;  Surgeon: Jesse James MD;  Location: [facility] OR [address] [place] [address];  Service: ENT;  Laterality: Bilateral;    INSERTION OF TUNNELED CENTRAL VENOUS CATHETER (CVC) WITH SUBCUTANEOUS PORT N/A 6/9/2022    Procedure: [id number];  Surgeon: Jesus Viera MD;  Location: [facility];  Service: General;  Laterality: N/A;    LARYNGECTOMY N/A 1/6/2022    Procedure: LARYNGECTOMY;  Surgeon: Jesse James MD;  Location: [address];  Service: ENT;  Laterality: N/A;    LARYNGOSCOPY N/A 8/4/2020    Procedure: Suspension microlaryngoscopy with biopsy, possible KTP laser treatment/excision;  Surgeon: Stew Noel MD;  Location: [address];  Service: ENT;  Laterality: N/A;  Microscope, telescopes, tower, microinstruments, KTP laser, rep conf# [id number] IC 7/28.    LARYNGOSCOPY N/A 3/16/2021    Procedure: Suspension microlaryngoscopy with excision of lesion, possible CO2 laser;  Surgeon: Stew Noel MD;  Location: [facility] OR [address] [place] [address];  Service: ENT;  Laterality: N/A;  Microscope, telescopes, tower, microinstruments, CO2 laser, rep conf# [id number] IC 3/4.    LARYNGOSCOPY N/A 4/1/2021    Procedure: Suspension microlaryngoscopy with KTP laser excision of lesion;  Surgeon: Stew AMOS  MD Bert;  [facility];  Service: ENT;  Laterality: N/A;  Microscope, telescopes, tower, microinstruments, 70 degree scope, vocal fold , KTP laser, rep conf# [id number] BC    LARYNGOSCOPY N/A 12/9/2021    Procedure: Suspension microlaryngoscopy with biopsy;  Surgeon: Stew Noel MD;  [facility];  Service: ENT;  Laterality: N/A;  Microscope, telescopes, tower, microinstruments    LARYNGOSCOPY N/A 1/6/2022    Procedure: LARYNGOSCOPY;  Surgeon: Jesse James MD;  [facility]: [facility];  Service: ENT;  Laterality: N/A;    LARYNGOSCOPY N/A 4/27/2022    Procedure: LARYNGOSCOPY WITH BIOPSY;  Surgeon: Jesse James MD;  [facility];  Service: ENT;  Laterality: N/A;    MICROLARYNGOSCOPY N/A 3/17/2020    Procedure: MICROLARYNGOSCOPY;  Surgeon: Jung Xiao MD;  Location: [facility] OR;  Service: ENT;  Laterality: N/A;  Laser Microlaryngoscopy  NEED TO SCHEDULE LASER from [place] [id number]    PHARYNGECTOMY  11/9/2022    Procedure: TOTAL PHARYNGECTOMY;  Surgeon: Jesse James MD;  [facility];  Service: ENT;;    REIMPLANTATION OF PARATHYROID TISSUE N/A 1/6/2022    Procedure: REIMPLANTATION, PARATHYROID TISSUE;  Surgeon: Jesse James MD;  [facility];  Service: ENT;  Laterality: N/A;    SKIN SPLIT GRAFT Right 11/9/2022    Procedure: APPLICATION, GRAFT, SKIN, SPLIT-THICKNESS;  Surgeon: Jesse James MD;  [facility];  Service: ENT;  Laterality: Right;    THYROIDECTOMY  1/6/2022    Procedure: THYROIDECTOMY;  Surgeon: Jesse James MD;  [facility];  Service: ENT;;    TRACHEOSTOMY N/A 12/27/2021    Procedure: CREATION, TRACHEOSTOMY;  Surgeon: Flex Espinosa MD;  [facility];  Service: ENT;  Laterality: N/A;       Time Tracking:     OT Date of Treatment: 11/10/22  OT Start Time: 1357  OT Stop Time: 1415  OT Total Time (min): 18 min    Billable  Minutes:Evaluation 10  Self Care/Home Management 8    11/10/2022

## 2022-11-10 NOTE — OP NOTE
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 Left anterolateral thigh free flap for coverage of pharyngeal defect as well as glossectomy defect   2. Complex closure of left thigh donor site closed length is 28 cm  3. Pharyngoplasty  4. Vestibuloplasty complex  5. Placement of salivary bypass tube   6. Split-thickness skin graft from right thigh to neck grafted area was 9 x 5 cm    Surgeon: Alise Hart MD    Assistant surgeon:  Diego James MD    Assistant(s):  MD Jo Ann Sales MD    Anesthesia: General Endotracheal    EBL:  Approximately 450 cc for my portion    Specimens:  None for my portion of the procedure    Findings:  Patient had a defect that compromised of a total glossectomy as well as pharyngectomy down to esophageal inlet behind the previous stoma.  An approximately 17 x 8 cm anterolateral thigh free flap was harvested with 8-9 cm of this tube and the remainder used to reconstruct the oral tongue.  Salivary bypass tube was placed 8 mm and this was secured with a silk suture to the left lateral oropharynx.  Microvascular summary    artery was anastomosed to the right facial artery.  The dominant flap vein was coupled directly into the right internal jugular with 2mm .  Remoov Doppler was placed on both the artery and the vein.    Indications for Procedure:  Mr. Batres is a 72-year-old gentleman seen by Dr. James with a recurrent cancer after prior laryngectomy and history of radiation therapy as well.  He was scheduled for salvage surgery and I was asked to reconstruct.    Procedure in Detail:  After informed consent had been obtained in holding area the risks and benefits reviewed patient was taken back to OR 14.  Anesthesia proceeded with general endotracheal anesthesia via  laryngectomy stoma.  Head and neck as well as the left thigh were prepped and draped in usual sterile fashion.  Time-out was undertaken verification of patient and correct procedure.  Dr. James then proceeded with the resection of the tumor please see his dictation for details.  After resection was completed I turned my attention to harvest of the free flap and reconstruction.      Line was drawn out on the left thigh from the anterior superior iliac spine to lateral patella.  At least 2 large perforators were dopplered posterior to this line and skin paddle was fashioned around these.  Measurement in the oral cavity and neck was previously done with need for at least 8-9 cm of tubed flap and then at least another 7 cm for the oral cavity.  For this reason a 17 x 8 cm skin paddle was fashioned around the perforators with planned extension of the incision superiorly and inferiorly along previously drawn line.  Starting with Bovie cautery in cutting mode the skin was incised on the anterior edge of the skin paddle and relaxing incision superiorly and inferiorly.  This was then carried with coag down to the crural fascia.  The fascia was entered with rectus femoris muscle identified.  Starting inferiorly the fascia was freed off of the rectus femoris and septum between the rectus and the vastus lateralis was identified.  Rectus femoris was freed along its length and retracted anteriorly.  Blunt dissection showed the vascular pedicle of the  and attention was turned to the posterior skin cut.  Again using Bovie in cutting mode the posterior skin incision was made and then continued in coag down to the fascia angie.  Fascia angie was divided.  In order to obtain adherence of the skin to the muscle below several tacking sutures were used to avoid shearing of the perforators with 3-0 Vicryl from the fascia and the muscle to the skin.  I then proceeded with harmonic focus kimberly with division of vastus lateralis  muscle with thinner layer of muscle taken inferiorly and then deeper muscle layer more superiorly in order to incorporate all the perforators.  Of note both perforators were septal cutaneous.  Layer vastus was taken in order to provide bulk for the tongue and submental area.  At this point attention was 1st turned to tubing of the flap.  Flap was secured with retaining Vicryl sutures and then tubing was done along its length from  distal skin paddle to mid skin paddle.  The tubing was done with interrupted subcutaneous 3-0 Vicryl sutures.  At this point at the superior portion dissection was turned to layer by layer with bipolar cautery with remainder of muscle freed from the pedicle.  Pedicle was then traced further proximally with nerve to the vastus divided and ligated clips.  The artery and vein were isolated with 2 veins isolated.  Artery and veins were then clipped and the flap was taken up to the head neck area.      Attention was 1st turned to the inset of the inferior portion of the flap to the esophagus.  This was done with interrupted horizontal mattress 3-0 Vicryl sutures after placement of a salivary bypass tube 8 mm.  In order to provide mobility for the vein and the arteries the superior flap was not inset with closure of the pharynx superiorly delayed till after microvascular portion.  At this point the microscope was brought in and attention was turned to microvascular portion.  The facial artery was  from the dominant vein and attention was 1st turned to the vein with at least 3.5 cm of the vein freed from the artery.  The right internal jugular vein was fairly scarred this was placed on a vascular loop as well as a vascular clamp inferiorly.  Venotomy was made and the flap vein was sewn into this venotomy with 9 0 nylon interrupted sutures.  The artery was cleared of adventitia as well as the facial artery which was prepped and cut back with good flow.   artery and facial artery  were mounted on Acland double clamp and end-to-end anastomosis was done with 9 0 in usual fashion.  Vascular system was based with lidocaine and Acland clamps were removed.  There was noted good blood flow in the artery with some leak around the and this side closure of the vein.  This was corrected with 9 0 nylon however after placement of the [employer] Doppler there was noted to be no flow in the vein.  This was taken down and decision was made to go and 2 and with the right internal jugular vein.  The internal jugular vein was ligated with medium clips superiorly and then placed on a bulldog inferiorly.  Previous a venotomy for the and this side was closed with interrupted 8 0 nylon.  The flap vein was freshened and the internal jugular vein was coupled to the flap vein with a 2-0 .  At this point Cook Doppler was replaced with normal flow through the vein.  Examination of the skin paddle showed brighter red blood pinprick bleeding.  Attention was turned to the completion of the inset and vestibuloplasty and pharyngoplasty.      Salivary tube was repositioned further in order to place the salivary tube at the junction of the nasopharynx and the oropharynx.  The posterior portion of the flap at the end of the tubed part was tacked to the posterior pharyngeal wall with combination of interrupted and horizontal mattress 3-0 Vicryl sutures.  This completed the closure of the posterior pharynx and pharyngoplasty.  The flap was then approximated to the tonsillar area and the palate and the remainder of the inset of the flap was done through the oral cavity.  The distal part of the flap was used to recreate the vestibule bilaterally with closure of the flap to the floor of mouth with horizontal mattress 3-0 Vicryl sutures.  This was done to bilateral portions of the mandibular arch and completed the inset with vestibuloplasty.  The salivary tube was secured to the lateral pharynx on the left with 2-0 silk suture.   Attention at this point was turned to closure of the the thigh and then the closure of the neck.    Wide undermining was undertaken from the defect in the left thigh under the fascia with fascial cuts done to release tension this was done along the length of the 28 cm of incision to the medial thigh and laterally posteriorly.  Nineteen Mongolian drain was placed and the wound was closed with deep 3-0 Vicryl and staples for the skin.    Attention was turned to the neck closure.  Vastus lateralis muscle was tacked laterally on the left side.  Upper portions of the neck were closed primarily with deep 3-0 Vicryl suture and staples for the skin.  Two Penrose drains were placed on either side of the neck and then remainder of the central neck above the previous free flap consisted of the vastus lateralis muscle.  In order to obtain closure a skin graft was needed for this area.  The right thigh was prepped and draped and using dermatome on 0.5 mm thickness a 9 x 5 cm skin graft was harvested and brought up to the head neck area.  This was sewn circumferentially with 5 0 chromic suture and tacked to the muscle.  This completed the closure of the neck.  and the patient was turned over the anesthesia and taken to the ICU in stable condition    Complications:  None    Attestation:  I was present for the entire procedure    DISCLAIMER: This note was prepared with [employer] voice recognition transcription software. Garbled syntax, mangled pronouns, and other bizarre constructions may be attributed to that software system. While efforts were made to correct any mistakes made by this voice recognition program, some errors and/or omissions may remain in the note that were missed when the note was originally created.

## 2022-11-10 NOTE — HOSPITAL COURSE
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 He presents to the OR today with ENT for Pharyngectomy, glossectomy, and free flap creation. Pt being admitted directly to the SICU from the OR. Of note, per ENT pt has trach in place to avoid secretions going down into his lungs. Pt also has venous and arterial doppler wires placed within the surgical site for q1H monitoring. Also informed to expect extreme swelling in the post op period.      On arrival to the SICU, pt HDS and satting 100% via trach collar on RA. Pt received 4L of crystalloid during the case along with one unit of pRBCs. He is off of vasopressors on arrival as well. He has a left radial a-line in place for BP monitoring. He has a left leg drain from graft taken and right leg skin graft wound with surgical bandage in place. Pt also has G tube in place along with mahan for UOP monitoring.

## 2022-11-10 NOTE — CONSULTS
[facility] - [facility] Intensive [facility]  Adult Nutrition  Consult Note    SUMMARY     Recommendations    1) Continue tube feeds of Impact Peptide 1.5, gradually increasing to goal rate of 55 ml/hr to provide 1980 kcal, 90g protein, and 1008ml water.     2) Once ready to transition to bolus, rec'd DenaZinkia Standard 1.4 (home formula) - 5 cans/day to provide 2275 kcal, 100g protein, 1170ml water.     3) RD to monitor and f/u.    Goals: Meet % of kcal/protein needs by RD f/u date  Nutrition Goal Status: new  Communication of RD Recs:  (POC)    Assessment and Plan  Nutrition Problem  Increased protein need    Related to (etiology):   Increased demand for nutrient    Signs and Symptoms (as evidenced by):   BoT CA s/p glossectomy     Interventions/Recommendations (treatment strategy):  Collaboration of nutrition care with other providers  EN    Nutrition Diagnosis Status:   New    Reason for Assessment    Reason For Assessment: consult (continuous and bolus recs)  Diagnosis:  (malignant neoplasm of base of tongue)  Relevant Medical History: HTN, DM2, Afib on OAC, GERD, hx of laryngeal SCC s/p laryngectomy and ALT flap on 1/2022  Interdisciplinary Rounds: did not attend    General Information Comments: Pt POD#1 s/p total phyaryngectomy, glossectomy, and neck dissection. Pt sleeping during attempted visit and did not rouse to verbal stimuli. TF infusing at 20 ml/hr, no episodes of N/V/D noted. Per chart review, pt sees outpatient RD for tube feed management. Pt w/ gradual wt gain (~12#) over the past 2 months d/t increased enteral consumption in preparation for surgery. Unable to perform NFPE - will conduct at f/u if warranted. No other s/s of malnutrition.     Nutrition Discharge Planning: adequate nutrition via PEG tube    Nutrition Risk Screen    Nutrition Risk Screen: tube feeding or parenteral nutrition    Nutrition/Diet History    Spiritual, Cultural Beliefs, Jain Practices, Values that Affect Care: no  Food  "Allergies: NKFA    Anthropometrics    Temp: 98.4 °F (36.9 °C)  Height: 5' 6" (167.6 cm)  Height (inches): 66 in  Weight: 65.8 kg (145 lb 1 oz)  Weight (lb): 145.06 lb  Ideal Body Weight (IBW), Male: 142 lb  % Ideal Body Weight, Male (lb): 102.15 %  BMI (Calculated): 23.4  BMI Grade: 18.5-24.9 - normal     Lab/Procedures/Meds    Pertinent Labs Reviewed: reviewed  Pertinent Labs Comments: Glu 199, Ca 8.2, Alb 3.1  Pertinent Medications Reviewed: reviewed  Pertinent Medications Comments: calcitriol, zofran, senna docusate, D5%-NaCl    Estimated/Assessed Needs    Weight Used For Calorie Calculations: 65.8 kg (145 lb 1 oz)  Energy Calorie Requirements (kcal): 1974-2303  Energy Need Method: Kcal/kg  Protein Requirements: 79-99g pro/day (1.5-2 g/kg)  Weight Used For Protein Calculations: 65.8 kg (145 lb 1 oz)  Fluid Requirements (mL): 1ml/kcal or fluid per MD  RDA Method (mL): 1974    Nutrition Prescription Ordered    Current Diet Order: NPO  Current Nutrition Support Formula Ordered: Impact Peptide 1.5  Current Nutrition Support Rate Ordered: 20 (ml)    Evaluation of Received Nutrient/Fluid Intake    I/O: +3.9L since admit  Energy Calories Required: not meeting needs  Protein Required: not meeting needs  Fluid Required: not meeting needs  Comments: LBM 11/8  Tolerance: tolerating  % Intake of Estimated Energy Needs: 0 - 25 %  % Meal Intake: 0 - 25 %    Nutrition Risk    Level of Risk/Frequency of Follow-up:  (1x/week)     Monitor and Evaluation    Food and Nutrient Intake: enteral nutrition intake  Food and Nutrient Adminstration: enteral and parenteral nutrition administration  Anthropometric Measurements: weight change  Biochemical Data, Medical Tests and Procedures: electrolyte and renal panel, gastrointestinal profile, inflammatory profile, lipid profile  Nutrition-Focused Physical Findings: overall appearance, extremities, muscles and bones     Nutrition Follow-Up    RD Follow-up?: Yes    "

## 2022-11-10 NOTE — PT/OT/SLP EVAL
Physical Therapy Co-Evaluation  Co-evaluation and treatment performed due to acuity and complexity of pt's medical status with 2 skilled disciplines needed to optimize pts functional performance in ICU setting.    Patient Name:  Cyrus Batres Jr.   MRN:  [id number]    Recommendations:     Discharge Recommendations:  home health PT   Discharge Equipment Recommendations:  (TBD pending pt progress)   Barriers to discharge: increased level of assistance    Assessment:     Cyrus Batres Jr. is a 72 y.o. male admitted with a medical diagnosis of Malignant neoplasm of base of tongue.  He presents with the following impairments/functional limitations:  weakness, impaired endurance, impaired self care skills, impaired functional mobility, gait instability, impaired balance, decreased upper extremity function, decreased lower extremity function, pain. Pt would benefit from HHPT for: Dynamic/static standing/sitting balance through skilled balance training, strengthening with the use of skilled therapeutic exercises interventions, and mobility through adaptive equipment training. Pt continues to benefit from a collaborative PT program to improve quality of life and focus on recovery of impairments.      Rehab Prognosis: Good; patient would benefit from acute skilled PT services to address these deficits and reach maximum level of function.    Recent Surgery: Procedure(s) (LRB):  TOTAL PHARYNGECTOMY  TOTAL GLOSSECTOMY (Bilateral)  CREATION, FREE FLAP, ALT (Left)  LARYNGOSCOPY, DIRECT  DISSECTION, NECK (Bilateral)  APPLICATION, GRAFT, SKIN, SPLIT-THICKNESS (Right) 1 Day Post-Op    Plan:     During this hospitalization, patient to be seen 4 x/week to address the identified rehab impairments via gait training, therapeutic activities, neuromuscular re-education and progress toward the following goals:    Plan of Care Expires:  12/10/22    Subjective     Chief Complaint: none verbalized  Patient/Family Comments/goals: return to  PL[facility]  Pain/Comfort:  Pain Rating 1: 2/10  Location 1: neck  Pain Addressed 1: Reposition  Pain Rating Post-Intervention 1: 0/10    Patients cultural, spiritual, Sikhism conflicts given the current situation: no    Living Environment:  Pt lives with his wife in a SSH with 1STE and a tub/shower.  Prior to admission, patients level of function was independent for all functional mobility and ADLs. Pt doesn't work , but does drive. Equipment used at home: none.  DME owned (not currently used): rolling walker, single point cane, bedside commode, and shower chair.  Upon discharge, patient will have assistance from spouse who can take time off.    Objective:     Communicated with RN prior to session.  Patient found HOB elevated with telemetry, pulse ox (continuous), peripheral IV, Tracheostomy, oxygen, mahan catheter, SCD, central line, arterial line, blood pressure cuff (external doppler)  upon PT entry to room.    General Precautions: Standard, NPO, fall   Orthopedic Precautions:N/A   Braces: N/A  Respiratory Status:  trach collar    Exams:  Cognitive Exam:  Patient is oriented to Person, Place, Time, and Situation  Gross Motor Coordination:  WFL  Postural Exam:  Patient presented with the following abnormalities:    -       Rounded shoulders  Sensation:  N/T  RLE ROM: WFL  RLE Strength: 3-4/5 observed  LLE ROM: WFL  LLE Strength: 3-4/5 observed    Functional Mobility:  Bed Mobility:     Supine to Sit: stand by assistance  Sit to Supine: stand by assistance  Transfers: deferred 2/2 increased drainage from neck and pt returned to supine  Balance:   Static Sitting: SBA  Dynamic Sitting: CGA  Static Standing: N/T      AM-PAC 6 CLICK MOBILITY  Total Score:13       Treatment & Education:  Patient educated on role of therapy, goals of session, and benefits of mobilizing.   Discussed PT plan of care during hospitalization.   Patient educated on calling for assistance.   Patient educated on how their diagnosis impacts their  mobility within PT scope of practice.   Communication board up to date.  All questions answered within PT scope of practice.    Patient left HOB elevated with all lines intact, call button in reach, and RN notified.    GOALS:   Multidisciplinary Problems       Physical Therapy Goals          Problem: Physical Therapy    Goal Priority Disciplines Outcome Goal Variances Interventions   Physical Therapy Goal     PT, PT/OT Ongoing, Progressing     Description: Goals to be met by: 2022     Patient will increase functional independence with mobility by performin. Supine to sit with [place]  2. Sit to supine with [place]  3. Sit to stand transfer with Stand-by Assistance  4. Bed to chair transfer with Stand-by Assistance using No Assistive Device  5. Gait  x 100 feet with Stand-by Assistance using No Assistive Device.                          History:     Past Medical History:   Diagnosis Date    Abnormal CT scan, neck 2022    Allergy     pollen extracts    Atrial fibrillation     Chronic anticoagulation     Diabetes mellitus, type 2     Hypertension     Larynx neoplasm malignant 2020    Postoperative hypothyroidism 2022    Tongue cancer        Past Surgical History:   Procedure Laterality Date    DIRECT LARYNGOBRONCHOSCOPY N/A 2021    Procedure: LARYNGOSCOPY, DIRECT, WITH BRONCHOSCOPY;  Surgeon: Flex Espinosa MD;  Location: [address];  Service: ENT;  Laterality: N/A;    DIRECT LARYNGOSCOPY  2022    Procedure: LARYNGOSCOPY, DIRECT;  Surgeon: Jesse James MD;  Location: [address];  Service: ENT;;    DISSECTION OF NECK Bilateral 2022    Procedure: DISSECTION, NECK;  Surgeon: Jesse James MD;  Location: [address];  Service: ENT;  Laterality: Bilateral;    DISSECTION OF NECK Bilateral 2022    Procedure: DISSECTION, NECK;  Surgeon: Jesse James MD;  Location: [address];  Service: ENT;  Laterality: Bilateral;    FLAP  PROCEDURE Right 1/6/2022    Procedure: CREATION, FREE FLAP;  Surgeon: Alise Hart MD;  Location: [facility] OR [facility] FLR;  Service: ENT;  Laterality: Right;  Ischemic start 1351  Ischemic stop 1502    FLAP PROCEDURE Left 11/9/2022    Procedure: CREATION, FREE FLAP, ALT;  Surgeon: Alise Hart MD;  Location: [facility] OR [facility] FLR;  Service: ENT;  Laterality: Left;    GLOSSECTOMY Bilateral 11/9/2022    Procedure: TOTAL GLOSSECTOMY;  Surgeon: Jesse James MD;  [facility];  Service: ENT;  Laterality: Bilateral;    INSERTION OF TUNNELED CENTRAL VENOUS CATHETER (CVC) WITH SUBCUTANEOUS PORT N/A 6/9/2022    Procedure: [id number];  Surgeon: Jesus Viera MD;  Location: [facility] OR;  Service: General;  Laterality: N/A;    LARYNGECTOMY N/A 1/6/2022    Procedure: LARYNGECTOMY;  Surgeon: Jesse James MD;  [facility];  Service: ENT;  Laterality: N/A;    LARYNGOSCOPY N/A 8/4/2020    Procedure: Suspension microlaryngoscopy with biopsy, possible KTP laser treatment/excision;  Surgeon: Stew Noel MD;  Location: [facility] OR [address] [place] [address];  Service: ENT;  Laterality: N/A;  Microscope, telescopes, tower, microinstruments, KTP laser, rep conf# [id number] IC 7/28.    LARYNGOSCOPY N/A 3/16/2021    Procedure: Suspension microlaryngoscopy with excision of lesion, possible CO2 laser;  Surgeon: Stew Noel MD;  [facility];  Service: ENT;  Laterality: N/A;  Microscope, telescopes, tower, microinstruments, CO2 laser, rep conf# [id number] IC 3/4.    LARYNGOSCOPY N/A 4/1/2021    Procedure: Suspension microlaryngoscopy with KTP laser excision of lesion;  Surgeon: Stew Noel MD;  [facility];  Service: ENT;  Laterality: N/A;  Microscope, telescopes, tower, microinstruments, 70 degree scope, vocal fold , KTP laser, rep conf# [id number] BC    LARYNGOSCOPY N/A 12/9/2021    Procedure: Suspension microlaryngoscopy with biopsy;  Surgeon: Stew Noel MD;   Location: [facility] OR 2ND FLR;  Service: ENT;  Laterality: N/A;  Microscope, telescopes, tower, microinstruments    LARYNGOSCOPY N/A 1/6/2022    Procedure: LARYNGOSCOPY;  Surgeon: Jesse James MD;  Location: [facility] OR 2ND FLR;  Service: ENT;  Laterality: N/A;    LARYNGOSCOPY N/A 4/27/2022    Procedure: LARYNGOSCOPY WITH BIOPSY;  Surgeon: Jesse James MD;  Location: [facility] OR 2ND FLR;  Service: ENT;  Laterality: N/A;    MICROLARYNGOSCOPY N/A 3/17/2020    Procedure: MICROLARYNGOSCOPY;  Surgeon: Jung Xiao MD;  [facility];  Service: ENT;  Laterality: N/A;  Laser Microlaryngoscopy  [facility] [phone]    PHARYNGECTOMY  11/9/2022    Procedure: TOTAL PHARYNGECTOMY;  Surgeon: Jesse James MD;  Location: [facility];  Service: ENT;;    REIMPLANTATION OF PARATHYROID TISSUE N/A 1/6/2022    Procedure: REIMPLANTATION, PARATHYROID TISSUE;  Surgeon: Jesse James MD;  Location: [facility] OR 2ND FLR;  Service: ENT;  Laterality: N/A;    SKIN SPLIT GRAFT Right 11/9/2022    Procedure: APPLICATION, GRAFT, SKIN, SPLIT-THICKNESS;  Surgeon: Jesse James MD;  Location: [facility] OR 2ND FLR;  Service: ENT;  Laterality: Right;    THYROIDECTOMY  1/6/2022    Procedure: THYROIDECTOMY;  Surgeon: Jesse James MD;  [facility];  Service: ENT;;    TRACHEOSTOMY N/A 12/27/2021    Procedure: CREATION, TRACHEOSTOMY;  Surgeon: Flex Espinosa MD;  Location: [facility] OR 2ND [facility]LR;  Service: ENT;  Laterality: N/A;       Time Tracking:     PT Received On: 11/10/22  PT Start Time: 1359     PT Stop Time: 1415  PT Total Time (min): 16 min     Billable Minutes: Evaluation 8 and Therapeutic Activity 8      11/10/2022

## 2022-11-10 NOTE — SUBJECTIVE & OBJECTIVE
Interval History: POD1 s/p total glossectomy and pharyngectomy. Postop PTH low but iCal WNL. Facial swelling overnight. Postop flap check WNL.     Medications:  Continuous Infusions:   dextrose 5 % and 0.45 % NaCl 100 mL/hr at 11/10/22 0600     Scheduled Meds:   ampicillin-sulbactim (UNASYN) IVPB  3 g Intravenous Q6H    calcitRIOL  0.25 mcg Per G Tube Daily    calcium carbonate  1,000 mg Per G Tube TID WM    enoxaparin  40 mg Subcutaneous Daily    gabapentin  300 mg Per G Tube TID    levothyroxine  100 mcg Per G Tube Before breakfast    losartan  50 mg Per G Tube Daily    mupirocin   Nasal BID    ondansetron  4 mg Intravenous Q12H    senna-docusate 8.6-50 mg  1 tablet Per G Tube BID    traZODone  50 mg Per G Tube QHS     PRN Meds:dextrose 10%, dextrose 10%, glucagon (human recombinant), HYDROcodone-acetaminophen, insulin aspart U-100, morphine, naloxone, ondansetron, prochlorperazine, sodium chloride 0.9%     Review of patient's allergies indicates:   Allergen Reactions    Pollen extracts     Lovastatin Rash     Not confirmed but pt skeptical     Objective:     Vital Signs (24h Range):  Temp:  [98.2 °F (36.8 °C)-98.4 °F (36.9 °C)] 98.3 °F (36.8 °C)  Pulse:  [69-89] 81  Resp:  [12-19] 19  SpO2:  [100 %] 100 %  BP: (102-121)/(54-65) 114/56  Arterial Line BP: ()/(43-81) 106/58       Lines/Drains/Airways       Central Venous Catheter Line  Duration             Port A Cath Single Lumen left subclavian -- days              Drain  Duration                  Gastrostomy/Enterostomy 12/27/21 1152 Percutaneous endoscopic gastrostomy (PEG)  days         Gastrostomy/Enterostomy 06/09/22 1935 midline feeding 153 days         Closed/Suction Drain 11/09/22 1518 Left;Anterior Thigh Bulb 19 Fr. <1 day         Urethral Catheter 11/09/22 0926 Non-latex 16 Fr. <1 day              Airway  Duration                  Surgical Airway Shiley Cuffed -- days         Laryngectomy (Do Not Remove) 06/09/22 1414 Laryngectomy stoma 153  days         Airway - Non-Surgical 11/09/22 0856 Coil Wire Tube <1 day              Arterial Line  Duration             Arterial Line 11/09/22 0901 Left Radial <1 day              Peripheral Intravenous Line  Duration                  Peripheral IV - Single Lumen 11/09/22 0726 18 G Left Hand <1 day         Peripheral IV - Single Lumen 11/09/22 2041 20 G Right Antecubital <1 day                    Physical Exam  Constitutional:       General: He is not in acute distress.  HENT:      Head:      Comments: Diffuse facial edema bilaterall, soft to palpation      Mouth/Throat:      Comments: Flap soft to palpation, sutures to FOM intact   Eyes:      Extraocular Movements: Extraocular movements intact.   Neck:      Comments: Trach in place to stoma   Skin graft to midline neck overlying flap, appropriate drainage  Staples c/d/I on lateral neck, penroses in place bilaterally   Doppler with good venous and arterial signals   Cardiovascular:      Rate and Rhythm: Normal rate and regular rhythm.   Pulmonary:      Effort: No respiratory distress.   Neurological:      Mental Status: He is alert.       Significant Labs:  CBC:   Recent Labs   Lab 11/10/22  0330   WBC 8.84   RBC 2.60*   HGB 8.2*   HCT 24.5*   *   MCV 94   MCH 31.5*   MCHC 33.5     CMP:   Recent Labs   Lab 11/10/22  0330   *   CALCIUM 8.2*   ALBUMIN 3.1*   PROT 5.1*      K 4.3   CO2 21*      BUN 20   CREATININE 1.0   ALKPHOS 53*   ALT 8*   AST 16   BILITOT 1.6*       Significant Diagnostics:  X-Ray: I have reviewed all pertinent results/findings within the past 24 hours and my personal findings are:  CXR WNL

## 2022-11-10 NOTE — SUBJECTIVE & OBJECTIVE
Follow-up For: Procedure(s) (LRB):  TOTAL PHARYNGECTOMY  TOTAL GLOSSECTOMY (Bilateral)  CREATION, FREE FLAP, ALT (Left)  LARYNGOSCOPY, DIRECT  DISSECTION, NECK (Bilateral)  APPLICATION, GRAFT, SKIN, SPLIT-THICKNESS (Right)    Post-Operative Day: Day of Surgery     Past Medical History:   Diagnosis Date    Abnormal CT scan, neck 09/22/2022    Allergy     pollen extracts    Atrial fibrillation     Chronic anticoagulation     Diabetes mellitus, type 2     Hypertension     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    Postoperative 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    Tongue cancer        Past Surgical History:   Procedure Laterality Date    DIRECT LARYNGOBRONCHOSCOPY N/A 12/27/2021    Procedure: LARYNGOSCOPY, DIRECT, WITH BRONCHOSCOPY;  Surgeon: Flex Espinosa MD;  [facility];  Service: ENT;  Laterality: N/A;    DISSECTION OF NECK Bilateral 1/6/2022    Procedure: DISSECTION, NECK;  Surgeon: Jesse James MD;  Location: [facility] OR [address] [place] [address];  Service: ENT;  Laterality: Bilateral;    FLAP PROCEDURE Right 1/6/2022    Procedure: CREATION, FREE FLAP;  Surgeon: Alise Hart MD;  [address];  Service: ENT;  Laterality: Right;  Ischemic start 1351  Ischemic stop 1502    INSERTION OF TUNNELED CENTRAL VENOUS CATHETER (CVC) WITH SUBCUTANEOUS PORT N/A 6/9/2022    Procedure: [id number]-T-CATH;  Surgeon: Jesus Viera MD;  [facility];  Service: General;  Laterality: N/A;    LARYNGECTOMY N/A 1/6/2022    Procedure: LARYNGECTOMY;  Surgeon: Jesse James MD;  [facility];  Service: ENT;  Laterality: N/A;    LARYNGOSCOPY N/A 8/4/2020    Procedure: Suspension microlaryngoscopy with biopsy, possible KTP laser treatment/excision;  Surgeon: Stew Noel MD;  [facility];  Service: ENT;  Laterality: N/A;  Microscope, telescopes, tower, microinstruments, KTP laser, rep conf# [id number] IC 7/28.    LARYNGOSCOPY N/A 3/16/2021    Procedure: Suspension microlaryngoscopy  with excision of lesion, possible CO2 laser;  Surgeon: Stew Noel MD;  Location: [facility] OR 2ND FLR;  Service: ENT;  Laterality: N/A;  Microscope, telescopes, tower, microinstruments, CO2 laser, rep conf# [id number] IC 3/4.    LARYNGOSCOPY N/A 4/1/2021    Procedure: Suspension microlaryngoscopy with KTP laser excision of lesion;  Surgeon: Stew Noel MD;  Location: [facility] OR [facility] FLR;  Service: ENT;  Laterality: N/A;  Microscope, telescopes, tower, microinstruments, 70 degree scope, vocal fold , KTP laser, rep conf# [id number] BC    LARYNGOSCOPY N/A 12/9/2021    Procedure: Suspension microlaryngoscopy with biopsy;  Surgeon: Stew Noel MD;  [facility];  Service: ENT;  Laterality: N/A;  Microscope, telescopes, tower, microinstruments    LARYNGOSCOPY N/A 1/6/2022    Procedure: LARYNGOSCOPY;  Surgeon: Jesse James MD;  Location: [facility] OR [facility] FLR;  Service: ENT;  Laterality: N/A;    LARYNGOSCOPY N/A 4/27/2022    Procedure: LARYNGOSCOPY WITH BIOPSY;  Surgeon: Jesse James MD;  [facility];  Service: ENT;  Laterality: N/A;    MICROLARYNGOSCOPY N/A 3/17/2020    Procedure: MICROLARYNGOSCOPY;  Surgeon: Jung Xiao MD;  Location: [facility] OR;  Service: ENT;  Laterality: N/A;  Laser Microlaryngoscopy  NEED TO SCHEDULE LASER from [facility] [id number]    REIMPLANTATION OF PARATHYROID TISSUE N/A 1/6/2022    Procedure: REIMPLANTATION, PARATHYROID TISSUE;  Surgeon: Jesse James MD;  [facility];  Service: ENT;  Laterality: N/A;    THYROIDECTOMY  1/6/2022    Procedure: THYROIDECTOMY;  Surgeon: Jesse James MD;  [facility];  Service: ENT;;    TRACHEOSTOMY N/A 12/27/2021    Procedure: CREATION, TRACHEOSTOMY;  Surgeon: Flex Espinosa MD;  Location: [facility] FLR;  Service: ENT;  Laterality: N/A;       Review of patient's allergies indicates:   Allergen Reactions    Pollen extracts     Lovastatin Rash     Not confirmed  but pt skeptical       Family History       Problem Relation (Age of Onset)    Abnormal EKG Mother    Diabetes Father    Heart disease Father    Hypertension Father          Tobacco Use    Smoking status: Never    Smokeless tobacco: Never   Substance and Sexual Activity    Alcohol use: Not Currently     Comment: occasional    Drug use: No    Sexual activity: Yes     Partners: Female      Review of Systems   Constitutional:  Negative for fatigue and fever.   HENT:  Positive for facial swelling. Negative for sore throat.         Pt denying any neck pain near surgical site    Eyes: Negative.    Respiratory:  Negative for chest tightness, shortness of breath and wheezing.    Cardiovascular: Negative.    Gastrointestinal: Negative.    Endocrine: Negative.    Musculoskeletal: Negative.    Neurological: Negative.    Hematological: Negative.    Psychiatric/Behavioral: Negative.     Objective:     Vital Signs (Most Recent):  Temp: 98.2 °F (36.8 °C) (11/09/22 2015)  Pulse: 78 (11/09/22 2030)  Resp: 16 (11/09/22 2030)  BP: 119/65 (11/09/22 0657)  SpO2: 100 % (11/09/22 2030) Vital Signs (24h Range):  Temp:  [98.2 °F (36.8 °C)-98.4 °F (36.9 °C)] 98.2 °F (36.8 °C)  Pulse:  [78-82] 78  Resp:  [16] 16  SpO2:  [100 %] 100 %  BP: (119)/(65) 119/65  Arterial Line BP: (106)/(51) 106/51        There is no height or weight on file to calculate BMI.      Intake/Output Summary (Last 24 hours) at 11/9/2022 2055  Last data filed at 11/9/2022 2040  Gross per 24 hour   Intake 6100 ml   Output 1910 ml   Net 4190 ml       Physical Exam  Constitutional:       General: He is not in acute distress.     Appearance: He is ill-appearing.   HENT:      Head:      Comments: Pt has large neck incision extending across neck up below the ears bl. Skin graft in place in central neck with appropriate color in immediate post op period. Pt has doppler wires placed in surgical wound extending out on the right side. Appropriate post op swelling.       Mouth/Throat:      Comments: Surgical wound and graft visible in posterior oral cavity.   Cardiovascular:      Rate and Rhythm: Normal rate and regular rhythm.      Pulses: Normal pulses.      Heart sounds: Normal heart sounds. No murmur heard.    No friction rub. No gallop.   Pulmonary:      Effort: Pulmonary effort is normal. No respiratory distress.      Breath sounds: Normal breath sounds. No wheezing or rales.   Abdominal:      General: Abdomen is flat. There is no distension.      Palpations: Abdomen is soft.   Genitourinary:     Comments: Juarez catheter   Musculoskeletal:         General: No swelling.   Skin:     General: Skin is warm and dry.      Capillary Refill: Capillary refill takes less than 2 seconds.      Comments: Left sided surgical wound with drain from graft recovery. Right sided skin graft with surgical bandage and appropriate minor bleeding.    Neurological:      General: No focal deficit present.      Mental Status: He is alert and oriented to person, place, and time. Mental status is at baseline.      Cranial Nerves: No cranial nerve deficit.      Motor: No weakness.   Psychiatric:         Mood and Affect: Mood normal.         Behavior: Behavior normal.         Thought Content: Thought content normal.         Judgment: Judgment normal.       Vents:  Oxygen Concentration (%): 28 (11/09/22 2030)    Lines/Drains/Airways       Central Venous Catheter Line  Duration             Port A Cath Single Lumen left subclavian -- days              Drain  Duration                  Gastrostomy/Enterostomy 12/27/21 1152 Percutaneous endoscopic gastrostomy (PEG)  days         Gastrostomy/Enterostomy 06/09/22 1935 midline feeding 153 days         Closed/Suction Drain 11/09/22 1518 Left;Anterior Thigh Bulb 19 Fr. <1 day         Urethral Catheter 11/09/22 0926 Non-latex 16 Fr. <1 day              Airway  Duration                  Laryngectomy (Do Not Remove) 06/09/22 1414 Laryngectomy stoma 153 days          Airway - Non-Surgical 11/09/22 0856 Coil Wire Tube <1 day              Arterial Line  Duration             Arterial Line 11/09/22 0901 Left Radial <1 day              Peripheral Intravenous Line  Duration                  Peripheral IV - Single Lumen 11/09/22 0726 18 G Left Hand <1 day         Peripheral IV - Single Lumen 11/09/22 2041 20 G Right Antecubital <1 day                    Significant Labs:    CBC/Anemia Profile:  Recent Labs   Lab 11/09/22 2031   WBC 11.28   HGB 9.1*   HCT 26.8*      MCV 95   RDW 14.6*        Chemistries:  No results for input(s): NA, K, CL, CO2, BUN, CREATININE, CALCIUM, ALBUMIN, PROT, BILITOT, ALKPHOS, ALT, AST, GLUCOSE, MG, PHOS in the last 48 hours.    All pertinent labs within the past 24 hours have been reviewed.    Significant Imaging: I have reviewed all pertinent imaging results/findings within the past 24 hours.

## 2022-11-10 NOTE — PLAN OF CARE
PT Eval complete and POC established.    11/10/2022    Problem: Physical Therapy  Goal: Physical Therapy Goal  Description: Goals to be met by: 2022     Patient will increase functional independence with mobility by performin. Supine to sit with Towns  2. Sit to supine with Towns  3. Sit to stand transfer with Stand-by Assistance  4. Bed to chair transfer with Stand-by Assistance using No Assistive Device  5. Gait  x 100 feet with Stand-by Assistance using No Assistive Device.     Outcome: Ongoing, Progressing

## 2022-11-10 NOTE — PLAN OF CARE
Recommendations    1) Continue tube feeds of Impact Peptide 1.5, gradually increasing to goal rate of 55 ml/hr to provide 1980 kcal, 90g protein, and 1008ml water.     2) Once ready to transition to bolus, rec'd Easydiagnosis Standard 1.4 (home formula) - 5 cans/day to provide 2275 kcal, 100g protein, 1170ml water.     3) RD to monitor and f/u.    Goals: Meet % of kcal/protein needs by RD f/u date  Nutrition Goal Status: new  Communication of RD Recs:  (POC)

## 2022-11-10 NOTE — PLAN OF CARE
Problem: Occupational Therapy  Goal: Occupational Therapy Goal  Description: Goals to be met by: 11/24/22     Patient will increase functional independence with ADLs by performing:    UE Dressing with Bradley.  LE Dressing with Stand-by Assistance.  Grooming while standing at sink with Supervision.  Toileting from toilet with Supervision for hygiene and clothing management.   Toilet transfer to toilet with Supervision.    Outcome: Ongoing, Progressing

## 2022-11-10 NOTE — PT/OT/SLP EVAL
[facility] - Surgical Intensive Care    Post-Operative Total Laryngectomy Evaluation    Patient Name:  Cyrus Batres Jr.   MRN:  [id number]  Admitting Diagnosis: Malignant neoplasm of base of tongue    PATIENT IS A NECK-BREATHER - DO NOT ORALLY INTUBATE    Recommendations:                 General Recommendations:  Post-laryngectomy care  Diet recommendations:  Patient NPO until cleared by ENT  Aspiration Precautions: Strict aspiration precautions  General Precautions: Standard, NPO    Communication:      Communication strategies: Yes/No questions only, Go to room if call light pushed, and Whiteboard/Paper-pencil provided   Patient communicating all wants and needs? yes   Supplies labeled with patient information?  yes   RN notified if supplies brought to patient's room?  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    Postoperative 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    Tongue cancer        Past Surgical History:   Procedure Laterality Date    DIRECT LARYNGOBRONCHOSCOPY N/A 12/27/2021    Procedure: LARYNGOSCOPY, DIRECT, WITH BRONCHOSCOPY;  Surgeon: Flex Espinosa MD;  Location: [address];  Service: ENT;  Laterality: N/A;    DIRECT LARYNGOSCOPY  11/9/2022    Procedure: LARYNGOSCOPY, DIRECT;  Surgeon: Jesse James MD;  Location: [address];  Service: ENT;;    DISSECTION OF NECK Bilateral 1/6/2022    Procedure: DISSECTION, NECK;  Surgeon: Jesse James MD;  Location: [address];  Service: ENT;  Laterality: Bilateral;    DISSECTION OF NECK Bilateral 11/9/2022    Procedure: DISSECTION, NECK;  Surgeon: Jesse James MD;  Location: [address];  Service: ENT;  Laterality: Bilateral;    FLAP PROCEDURE Right 1/6/2022    Procedure: CREATION, FREE FLAP;  Surgeon: Alise Hart,  MD;  [facility];  Service: ENT;  Laterality: Right;  Ischemic start 1351  Ischemic stop 1502    FLAP PROCEDURE Left 11/9/2022    Procedure: CREATION, FREE FLAP, ALT;  Surgeon: Alise Hart MD;  [facility];  Service: ENT;  Laterality: Left;    GLOSSECTOMY Bilateral 11/9/2022    Procedure: TOTAL GLOSSECTOMY;  Surgeon: Jesse James MD;  [facility];  Service: ENT;  Laterality: Bilateral;    INSERTION OF TUNNELED CENTRAL VENOUS CATHETER (CVC) WITH SUBCUTANEOUS PORT N/A 6/9/2022    Procedure: [id number]-T-CATH;  Surgeon: Jesus Viera MD;  Location: [facility];  Service: General;  Laterality: N/A;    LARYNGECTOMY N/A 1/6/2022    Procedure: LARYNGECTOMY;  Surgeon: Jesse James MD;  Location: [facility] OR [address] [place] [address];  Service: ENT;  Laterality: N/A;    LARYNGOSCOPY N/A 8/4/2020    Procedure: Suspension microlaryngoscopy with biopsy, possible KTP laser treatment/excision;  Surgeon: Stew Noel MD;  Location: [facility] OR [address] [place] [address];  Service: ENT;  Laterality: N/A;  Microscope, telescopes, tower, microinstruments, KTP laser, rep conf# [id number] IC 7/28.    LARYNGOSCOPY N/A 3/16/2021    Procedure: Suspension microlaryngoscopy with excision of lesion, possible CO2 laser;  Surgeon: Stew Noel MD;  Location: [address];  Service: ENT;  Laterality: N/A;  Microscope, telescopes, tower, microinstruments, CO2 laser, rep conf# [id number] IC 3/4.    LARYNGOSCOPY N/A 4/1/2021    Procedure: Suspension microlaryngoscopy with KTP laser excision of lesion;  Surgeon: Stew Noel MD;  [facility];  Service: ENT;  Laterality: N/A;  Microscope, telescopes, tower, microinstruments, 70 degree scope, vocal fold , KTP laser, rep conf# [id number] BC    LARYNGOSCOPY N/A 12/9/2021    Procedure: Suspension microlaryngoscopy with biopsy;  Surgeon: Stew Noel MD;  Location: [facility] OR [address] [place] [address];  Service: ENT;  Laterality: N/A;  Microscope, telescopes,  heather butler    LARYNGOSCOPY N/A 1/6/2022    Procedure: LARYNGOSCOPY;  Surgeon: Jesse James MD;  Location: [facility] OR [facility] FLR;  Service: ENT;  Laterality: N/A;    LARYNGOSCOPY N/A 4/27/2022    Procedure: LARYNGOSCOPY WITH BIOPSY;  Surgeon: Jesse James MD;  Location: [facility] OR [facility]LR;  Service: ENT;  Laterality: N/A;    MICROLARYNGOSCOPY N/A 3/17/2020    Procedure: MICROLARYNGOSCOPY;  Surgeon: Jung Xiao MD;  Location: [facility] OR;  Service: ENT;  Laterality: N/A;  Laser Microlaryngoscopy  NEED TO SCHEDULE LASER from [employer] [id number]    PHARYNGECTOMY  11/9/2022    Procedure: TOTAL PHARYNGECTOMY;  Surgeon: Jesse James MD;  [facility];  Service: ENT;;    REIMPLANTATION OF PARATHYROID TISSUE N/A 1/6/2022    Procedure: REIMPLANTATION, PARATHYROID TISSUE;  Surgeon: Jesse James MD;  [facility];  Service: ENT;  Laterality: N/A;    SKIN SPLIT GRAFT Right 11/9/2022    Procedure: APPLICATION, GRAFT, SKIN, SPLIT-THICKNESS;  Surgeon: Jesse James MD;  [facility];  Service: ENT;  Laterality: Right;    THYROIDECTOMY  1/6/2022    Procedure: THYROIDECTOMY;  Surgeon: Jesse James MD;  Location: [facility] OR [address] [place] [address];  Service: ENT;;    TRACHEOSTOMY N/A 12/27/2021    Procedure: CREATION, TRACHEOSTOMY;  Surgeon: Flex Espinosa MD;  [facility];  Service: ENT;  Laterality: N/A;       Date of Surgery: 1/6/22    The patient received a total laryngectomy with bilateral neck dissection.  PEG placed at time of surgery?  yes  TEP placed at time of surgery?  no    Prior to Surgery - Prior Level of Functioning:     Patient well known to ST from prior admissions and outpatient visits. Old laryngectomee admitted for new glossectomy and pharyngectomy. ST consulted for mp supplies.    Subjective:     Patient awake and alert    Pain/Comfort:  Pain Rating 1: 0/10  Pain Rating Post-Intervention 1: 0/10    Respiratory Status:   Sravan collar    Objective:     Equipment in Stoma:  Trach Tube  Cuffed    Equipment/Supplies Provided to Patient:  Mp tube 9/36  HMEs    Education:     Educated patient and spouse regarding POC for ST this admit and they verbalized understanding. Patient typically uses baseplate and HME at baseline. SLP will f/u to ensure patient's ability to care for mp tube.     Goals:   Multidisciplinary Problems       SLP Goals          Problem: SLP    Goal Priority Disciplines Outcome   SLP Goal     SLP    Description: Speech-Language Pathology Goals  Goals to be met by 11/17/22  1. Patient will demonstrate independent stoma care and knowledge of laryngectomy info.                        Plan:     Patient to be seen:  3 x/week   Plan of Care expires:  12/10/22  Plan of Care reviewed with:  patient, spouse   SLP Follow-Up:  Yes       Discharge recommendations:   (no further ST anticipated)   Barriers to Discharge:  None    Time Tracking:     SLP Treatment Date:   11/10/22  Speech Start Time:  1048  Speech Stop Time:  1104     Speech Total Time (min):  16 min    Billable Minutes: Evaluation Use/Fit Voiced Prosthetic 8 and Self Care/Home Management Training 8    Chevy Bolanos CCC-SLP  Speech-Language Pathology  Pager: [phone]   11/10/2022

## 2022-11-10 NOTE — ASSESSMENT & PLAN NOTE
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 Now s/p total glossectomy, pharyngectomy, and ALT free flap and STSG reconstruction on 11/9.        ENT/HNS:  - Q1H flap checks  - No neck ties   - Will exchange tracheostomy tube for mp tube today   - SLP evaluation  - Bacitracin ointemnt to incision sites   -Sign above bed + outside door stating patient is "neck breather" and "can not be intubated by mouth"  -Clean laryngectomy stoma as needed, or daily  -Fresh wound care, clean incision with peroxide/qtip followed by bacitracin BID  -Humidified O2 via trach collar     Neuro:   -Pain: Multimodality PRN regimen initiated     Cardiac:  -Remain normotensive  -Page ENT before starting vasopressors  - D/C a-line   - H/O of paroxysmal a fib, will CTM      Pulmonary:   - Postoperative CXR WNL   - PRN breathing treatments  - Humidified air per trach collar     Renal:   - monitor Is and Os  - Cr stable  - OK to remove mahan      Infectious Disease:  - Daily CBC  - Continue unasyn; 1/4     Hematology/Oncology:  - H/H stable  - Lovenox DVT prophylaxis      FEN/GI  - Postoperative hypoparathyroidism, iCal WNL    - Will consider endocrinology consultation   - Replace electrolytes as needed to keep K>4, Mg>2  - Bowel reg  - Protonix for GI prophylaxis  - Nausea control ondansetron, phenergan  - Initiate trickle TFs   - Continue maintenance IVF      MSK  - Out of bed as tolerated  - Drain care   - Keep island dressing in place      Dispo:  -Continue ICU care    "

## 2022-11-10 NOTE — PLAN OF CARE
[facility] - Surgical Intensive Care  Initial Discharge Assessment       Primary Care Provider: Maico Patterson MD    Admission Diagnosis: Malignant neoplasm of base of tongue [C01]  Squamous cell carcinoma of base of tongue [C01]    Admission Date: 11/9/2022  Expected Discharge Date: 11/17/2022    Discharge Barriers Identified: None    Payor: [employer] MANAGED MEDICARE / Plan: [employer] MEDICARE HMO / Product Type: Capitation /     Extended Emergency Contact Information  Primary Emergency Contact: Janel Batres  Address: [address] [place] [address] [place]           [place] [address] [place]  Mobile Phone: [phone]  Relation: Spouse  Preferred language: English   needed? No    Discharge Plan A:  (TBD)  Discharge Plan B:  (TBD)      [id number] [place] - [address]  [address] [place] [address]  Phone: [phone] Fax: [phone]    [facility] - [place] [address]   [address] [facility] [address]  Phone: [phone] Fax: [phone]      Initial Assessment (most recent)       Adult Discharge Assessment - 11/10/22 1039          Discharge Assessment    Assessment Type Discharge Planning Assessment     Confirmed/corrected address, phone number and insurance Yes     Confirmed Demographics Correct on Facesheet     Source of Information family     When was your last doctors appointment? 11/04/22     Communicated CHIARA with patient/caregiver Date not available/Unable to determine     Lives With spouse     Do you expect to return to your current living situation? Yes     Do you have help at home or someone to help you manage your care at home? Yes     Prior to hospitilization cognitive status: No Deficits     Current cognitive status: Coma/Sedated/Intubated     Walking or Climbing Stairs Difficulty none     Dressing/Bathing Difficulty none     Home Layout Able to live on 1st floor     Equipment Currently Used at Home none      Readmission within 30 days? No     Patient currently being followed by outpatient case management? No     Do you currently have service(s) that help you manage your care at home? No     Do you take prescription medications? Yes     Do you have prescription coverage? Yes     Do you have any problems affording any of your prescribed medications? No     Is the patient taking medications as prescribed? yes     Who is going to help you get home at discharge? SPOUSE MORA      How do you get to doctors appointments? family or friend will provide     Are you on dialysis? No     Do you take coumadin? No     Discharge Plan A --   TBD    Discharge Plan B --   TBD    DME Needed Upon Discharge  feeding device;nutrition supplies;suction machine;respiratory supplies     Discharge Plan discussed with: Spouse/sig other     Discharge Barriers Identified None                   Patient lives in a 1 story house with his wife he is not on dialysis and does not take coumadin he has a ride home

## 2022-11-11 ENCOUNTER — OUTPATIENT CASE MANAGEMENT (OUTPATIENT)
Dept: ADMINISTRATIVE | Facility: OTHER | Age: 72
End: 2022-11-11
Payer: MEDICARE

## 2022-11-11 LAB
ALBUMIN SERPL BCP-MCNC: 2.8 G/DL (ref 3.5–5.2)
ALP SERPL-CCNC: 52 U/L (ref 55–135)
ALP SERPL-CCNC: 52 U/L (ref 55–135)
ALP SERPL-CCNC: 56 U/L (ref 55–135)
ALT SERPL W/O P-5'-P-CCNC: 10 U/L (ref 10–44)
ALT SERPL W/O P-5'-P-CCNC: 8 U/L (ref 10–44)
ALT SERPL W/O P-5'-P-CCNC: 8 U/L (ref 10–44)
ANION GAP SERPL CALC-SCNC: 11 MMOL/L (ref 8–16)
ANION GAP SERPL CALC-SCNC: 11 MMOL/L (ref 8–16)
ANION GAP SERPL CALC-SCNC: 9 MMOL/L (ref 8–16)
AST SERPL-CCNC: 17 U/L (ref 10–40)
BASOPHILS # BLD AUTO: 0.02 K/UL (ref 0–0.2)
BASOPHILS # BLD AUTO: 0.02 K/UL (ref 0–0.2)
BASOPHILS NFR BLD: 0.3 % (ref 0–1.9)
BASOPHILS NFR BLD: 0.3 % (ref 0–1.9)
BILIRUB SERPL-MCNC: 0.7 MG/DL (ref 0.1–1)
BUN SERPL-MCNC: 21 MG/DL (ref 8–23)
CA-I BLDV-SCNC: 1.04 MMOL/L (ref 1.06–1.42)
CA-I BLDV-SCNC: 1.09 MMOL/L (ref 1.06–1.42)
CA-I BLDV-SCNC: 1.09 MMOL/L (ref 1.06–1.42)
CALCIUM SERPL-MCNC: 7.3 MG/DL (ref 8.7–10.5)
CALCIUM SERPL-MCNC: 7.3 MG/DL (ref 8.7–10.5)
CALCIUM SERPL-MCNC: 8.4 MG/DL (ref 8.7–10.5)
CHLORIDE SERPL-SCNC: 106 MMOL/L (ref 95–110)
CO2 SERPL-SCNC: 19 MMOL/L (ref 23–29)
CO2 SERPL-SCNC: 19 MMOL/L (ref 23–29)
CO2 SERPL-SCNC: 25 MMOL/L (ref 23–29)
CREAT SERPL-MCNC: 0.8 MG/DL (ref 0.5–1.4)
DIFFERENTIAL METHOD: ABNORMAL
DIFFERENTIAL METHOD: ABNORMAL
EOSINOPHIL # BLD AUTO: 0.1 K/UL (ref 0–0.5)
EOSINOPHIL # BLD AUTO: 0.1 K/UL (ref 0–0.5)
EOSINOPHIL NFR BLD: 1.2 % (ref 0–8)
EOSINOPHIL NFR BLD: 1.2 % (ref 0–8)
ERYTHROCYTE [DISTWIDTH] IN BLOOD BY AUTOMATED COUNT: 14.3 % (ref 11.5–14.5)
ERYTHROCYTE [DISTWIDTH] IN BLOOD BY AUTOMATED COUNT: 14.3 % (ref 11.5–14.5)
EST. GFR  (NO RACE VARIABLE): >60 ML/MIN/1.73 M^2
GLUCOSE SERPL-MCNC: 147 MG/DL (ref 70–110)
GLUCOSE SERPL-MCNC: 179 MG/DL (ref 70–110)
GLUCOSE SERPL-MCNC: 179 MG/DL (ref 70–110)
HCT VFR BLD AUTO: 23.4 % (ref 40–54)
HCT VFR BLD AUTO: 23.4 % (ref 40–54)
HGB BLD-MCNC: 8.1 G/DL (ref 14–18)
HGB BLD-MCNC: 8.1 G/DL (ref 14–18)
IMM GRANULOCYTES # BLD AUTO: 0.04 K/UL (ref 0–0.04)
IMM GRANULOCYTES # BLD AUTO: 0.04 K/UL (ref 0–0.04)
IMM GRANULOCYTES NFR BLD AUTO: 0.5 % (ref 0–0.5)
IMM GRANULOCYTES NFR BLD AUTO: 0.5 % (ref 0–0.5)
LYMPHOCYTES # BLD AUTO: 0.6 K/UL (ref 1–4.8)
LYMPHOCYTES # BLD AUTO: 0.6 K/UL (ref 1–4.8)
LYMPHOCYTES NFR BLD: 7.9 % (ref 18–48)
LYMPHOCYTES NFR BLD: 7.9 % (ref 18–48)
MAGNESIUM SERPL-MCNC: 1.4 MG/DL (ref 1.6–2.6)
MCH RBC QN AUTO: 32.3 PG (ref 27–31)
MCH RBC QN AUTO: 32.3 PG (ref 27–31)
MCHC RBC AUTO-ENTMCNC: 34.6 G/DL (ref 32–36)
MCHC RBC AUTO-ENTMCNC: 34.6 G/DL (ref 32–36)
MCV RBC AUTO: 93 FL (ref 82–98)
MCV RBC AUTO: 93 FL (ref 82–98)
MONOCYTES # BLD AUTO: 0.6 K/UL (ref 0.3–1)
MONOCYTES # BLD AUTO: 0.6 K/UL (ref 0.3–1)
MONOCYTES NFR BLD: 7.6 % (ref 4–15)
MONOCYTES NFR BLD: 7.6 % (ref 4–15)
NEUTROPHILS # BLD AUTO: 6.2 K/UL (ref 1.8–7.7)
NEUTROPHILS # BLD AUTO: 6.2 K/UL (ref 1.8–7.7)
NEUTROPHILS NFR BLD: 82.5 % (ref 38–73)
NEUTROPHILS NFR BLD: 82.5 % (ref 38–73)
NRBC BLD-RTO: 0 /100 WBC
NRBC BLD-RTO: 0 /100 WBC
PHOSPHATE SERPL-MCNC: 3 MG/DL (ref 2.7–4.5)
PLATELET # BLD AUTO: 124 K/UL (ref 150–450)
PLATELET # BLD AUTO: 124 K/UL (ref 150–450)
PMV BLD AUTO: 11.3 FL (ref 9.2–12.9)
PMV BLD AUTO: 11.3 FL (ref 9.2–12.9)
POTASSIUM SERPL-SCNC: 3.5 MMOL/L (ref 3.5–5.1)
PROT SERPL-MCNC: 5.2 G/DL (ref 6–8.4)
PROT SERPL-MCNC: 5.2 G/DL (ref 6–8.4)
PROT SERPL-MCNC: 5.6 G/DL (ref 6–8.4)
RBC # BLD AUTO: 2.51 M/UL (ref 4.6–6.2)
RBC # BLD AUTO: 2.51 M/UL (ref 4.6–6.2)
SODIUM SERPL-SCNC: 136 MMOL/L (ref 136–145)
SODIUM SERPL-SCNC: 136 MMOL/L (ref 136–145)
SODIUM SERPL-SCNC: 137 MMOL/L (ref 136–145)
SODIUM SERPL-SCNC: 140 MMOL/L (ref 136–145)
WBC # BLD AUTO: 7.46 K/UL (ref 3.9–12.7)
WBC # BLD AUTO: 7.46 K/UL (ref 3.9–12.7)

## 2022-11-11 PROCEDURE — 25000003 PHARM REV CODE 250

## 2022-11-11 PROCEDURE — 27000221 HC OXYGEN, UP TO 24 HOURS

## 2022-11-11 PROCEDURE — 63600175 PHARM REV CODE 636 W HCPCS: Performed by: SURGERY

## 2022-11-11 PROCEDURE — 80053 COMPREHEN METABOLIC PANEL: CPT | Mod: 91 | Performed by: OTOLARYNGOLOGY

## 2022-11-11 PROCEDURE — 94761 N-INVAS EAR/PLS OXIMETRY MLT: CPT

## 2022-11-11 PROCEDURE — 84100 ASSAY OF PHOSPHORUS: CPT | Performed by: OTOLARYNGOLOGY

## 2022-11-11 PROCEDURE — 99900031 HC PATIENT EDUCATION (STAT)

## 2022-11-11 PROCEDURE — 99900035 HC TECH TIME PER 15 MIN (STAT)

## 2022-11-11 PROCEDURE — 63600175 PHARM REV CODE 636 W HCPCS

## 2022-11-11 PROCEDURE — 25000003 PHARM REV CODE 250: Performed by: STUDENT IN AN ORGANIZED HEALTH CARE EDUCATION/TRAINING PROGRAM

## 2022-11-11 PROCEDURE — S5010 5% DEXTROSE AND 0.45% SALINE: HCPCS | Performed by: SURGERY

## 2022-11-11 PROCEDURE — 80053 COMPREHEN METABOLIC PANEL: CPT | Performed by: STUDENT IN AN ORGANIZED HEALTH CARE EDUCATION/TRAINING PROGRAM

## 2022-11-11 PROCEDURE — 84295 ASSAY OF SERUM SODIUM: CPT | Performed by: STUDENT IN AN ORGANIZED HEALTH CARE EDUCATION/TRAINING PROGRAM

## 2022-11-11 PROCEDURE — 31720 CLEARANCE OF AIRWAYS: CPT

## 2022-11-11 PROCEDURE — 82330 ASSAY OF CALCIUM: CPT | Performed by: STUDENT IN AN ORGANIZED HEALTH CARE EDUCATION/TRAINING PROGRAM

## 2022-11-11 PROCEDURE — 85025 COMPLETE CBC W/AUTO DIFF WBC: CPT | Performed by: STUDENT IN AN ORGANIZED HEALTH CARE EDUCATION/TRAINING PROGRAM

## 2022-11-11 PROCEDURE — 99223 1ST HOSP IP/OBS HIGH 75: CPT | Mod: GC,,, | Performed by: SURGERY

## 2022-11-11 PROCEDURE — 63600175 PHARM REV CODE 636 W HCPCS: Performed by: STUDENT IN AN ORGANIZED HEALTH CARE EDUCATION/TRAINING PROGRAM

## 2022-11-11 PROCEDURE — 92507 TX SP LANG VOICE COMM INDIV: CPT

## 2022-11-11 PROCEDURE — 25000242 PHARM REV CODE 250 ALT 637 W/ HCPCS: Performed by: SURGERY

## 2022-11-11 PROCEDURE — 20000000 HC ICU ROOM

## 2022-11-11 PROCEDURE — 99223 PR INITIAL HOSPITAL CARE,LEVL III: ICD-10-PCS | Mod: GC,,, | Performed by: SURGERY

## 2022-11-11 PROCEDURE — 83735 ASSAY OF MAGNESIUM: CPT | Performed by: OTOLARYNGOLOGY

## 2022-11-11 PROCEDURE — 82330 ASSAY OF CALCIUM: CPT | Mod: 91 | Performed by: STUDENT IN AN ORGANIZED HEALTH CARE EDUCATION/TRAINING PROGRAM

## 2022-11-11 PROCEDURE — 25000003 PHARM REV CODE 250: Performed by: SURGERY

## 2022-11-11 PROCEDURE — 99900026 HC AIRWAY MAINTENANCE (STAT)

## 2022-11-11 PROCEDURE — 51798 US URINE CAPACITY MEASURE: CPT

## 2022-11-11 RX ORDER — SODIUM CHLORIDE 9 MG/ML
INJECTION, SOLUTION INTRAVENOUS
Status: DISCONTINUED | OUTPATIENT
Start: 2022-11-11 | End: 2022-11-20 | Stop reason: HOSPADM

## 2022-11-11 RX ORDER — MAGNESIUM SULFATE HEPTAHYDRATE 40 MG/ML
4 INJECTION, SOLUTION INTRAVENOUS
Status: DISCONTINUED | OUTPATIENT
Start: 2022-11-11 | End: 2022-11-14

## 2022-11-11 RX ORDER — CALCIUM GLUCONATE 20 MG/ML
1 INJECTION, SOLUTION INTRAVENOUS
Status: DISCONTINUED | OUTPATIENT
Start: 2022-11-11 | End: 2022-11-14

## 2022-11-11 RX ORDER — CALCIUM GLUCONATE 20 MG/ML
3 INJECTION, SOLUTION INTRAVENOUS
Status: DISCONTINUED | OUTPATIENT
Start: 2022-11-11 | End: 2022-11-14

## 2022-11-11 RX ORDER — POTASSIUM CHLORIDE 7.45 MG/ML
40 INJECTION INTRAVENOUS
Status: DISCONTINUED | OUTPATIENT
Start: 2022-11-11 | End: 2022-11-14

## 2022-11-11 RX ORDER — MAGNESIUM SULFATE HEPTAHYDRATE 40 MG/ML
2 INJECTION, SOLUTION INTRAVENOUS
Status: DISCONTINUED | OUTPATIENT
Start: 2022-11-11 | End: 2022-11-14

## 2022-11-11 RX ORDER — SODIUM CHLORIDE 0.9 % (FLUSH) 0.9 %
10 SYRINGE (ML) INJECTION
Status: DISCONTINUED | OUTPATIENT
Start: 2022-11-11 | End: 2022-11-20 | Stop reason: HOSPADM

## 2022-11-11 RX ORDER — CALCIUM GLUCONATE 20 MG/ML
2 INJECTION, SOLUTION INTRAVENOUS
Status: DISCONTINUED | OUTPATIENT
Start: 2022-11-11 | End: 2022-11-14

## 2022-11-11 RX ADMIN — POTASSIUM BICARBONATE 40 MEQ: 391 TABLET, EFFERVESCENT ORAL at 08:11

## 2022-11-11 RX ADMIN — ENOXAPARIN SODIUM 40 MG: 100 INJECTION SUBCUTANEOUS at 05:11

## 2022-11-11 RX ADMIN — DEXTROSE AND SODIUM CHLORIDE: 5; .45 INJECTION, SOLUTION INTRAVENOUS at 08:11

## 2022-11-11 RX ADMIN — ACETAMINOPHEN 999.01 MG: 650 SOLUTION ORAL at 02:11

## 2022-11-11 RX ADMIN — SENNOSIDES AND DOCUSATE SODIUM 1 TABLET: 50; 8.6 TABLET ORAL at 08:11

## 2022-11-11 RX ADMIN — POTASSIUM CHLORIDE 40 MEQ: 7.46 INJECTION, SOLUTION INTRAVENOUS at 09:11

## 2022-11-11 RX ADMIN — AMPICILLIN SODIUM AND SULBACTAM SODIUM 3 G: 2; 1 INJECTION, POWDER, FOR SOLUTION INTRAMUSCULAR; INTRAVENOUS at 03:11

## 2022-11-11 RX ADMIN — MORPHINE SULFATE 2 MG: 2 INJECTION, SOLUTION INTRAMUSCULAR; INTRAVENOUS at 06:11

## 2022-11-11 RX ADMIN — CALCIUM GLUCONATE 1 G: 20 INJECTION, SOLUTION INTRAVENOUS at 06:11

## 2022-11-11 RX ADMIN — ONDANSETRON 4 MG: 2 INJECTION INTRAMUSCULAR; INTRAVENOUS at 08:11

## 2022-11-11 RX ADMIN — ACETAMINOPHEN 999.01 MG: 650 SOLUTION ORAL at 06:11

## 2022-11-11 RX ADMIN — TRAZODONE HYDROCHLORIDE 50 MG: 50 TABLET ORAL at 09:11

## 2022-11-11 RX ADMIN — GABAPENTIN 300 MG: 300 CAPSULE ORAL at 02:11

## 2022-11-11 RX ADMIN — ONDANSETRON 4 MG: 2 INJECTION INTRAMUSCULAR; INTRAVENOUS at 09:11

## 2022-11-11 RX ADMIN — GABAPENTIN 300 MG: 300 CAPSULE ORAL at 09:11

## 2022-11-11 RX ADMIN — CALCITRIOL CAPSULES 0.25 MCG 0.25 MCG: 0.25 CAPSULE ORAL at 08:11

## 2022-11-11 RX ADMIN — OXYCODONE HYDROCHLORIDE 5 MG: 5 SOLUTION ORAL at 07:11

## 2022-11-11 RX ADMIN — CALCIUM CARBONATE 1000 MG: 1250 SUSPENSION ORAL at 08:11

## 2022-11-11 RX ADMIN — CALCIUM CARBONATE 1000 MG: 1250 SUSPENSION ORAL at 12:11

## 2022-11-11 RX ADMIN — ACETAMINOPHEN 999.01 MG: 650 SOLUTION ORAL at 09:11

## 2022-11-11 RX ADMIN — MAGNESIUM SULFATE 4 G: 2 INJECTION INTRAVENOUS at 09:11

## 2022-11-11 RX ADMIN — AMPICILLIN SODIUM AND SULBACTAM SODIUM 3 G: 2; 1 INJECTION, POWDER, FOR SOLUTION INTRAMUSCULAR; INTRAVENOUS at 05:11

## 2022-11-11 RX ADMIN — GABAPENTIN 300 MG: 300 CAPSULE ORAL at 08:11

## 2022-11-11 RX ADMIN — CALCIUM CARBONATE 1000 MG: 1250 SUSPENSION ORAL at 05:11

## 2022-11-11 RX ADMIN — OXYCODONE HYDROCHLORIDE 5 MG: 5 SOLUTION ORAL at 02:11

## 2022-11-11 RX ADMIN — SODIUM CHLORIDE: 0.9 INJECTION, SOLUTION INTRAVENOUS at 09:11

## 2022-11-11 RX ADMIN — LEVOTHYROXINE SODIUM 100 MCG: 100 TABLET ORAL at 06:11

## 2022-11-11 RX ADMIN — MUPIROCIN: 20 OINTMENT TOPICAL at 08:11

## 2022-11-11 RX ADMIN — AMPICILLIN SODIUM AND SULBACTAM SODIUM 3 G: 2; 1 INJECTION, POWDER, FOR SOLUTION INTRAMUSCULAR; INTRAVENOUS at 09:11

## 2022-11-11 RX ADMIN — MUPIROCIN: 20 OINTMENT TOPICAL at 09:11

## 2022-11-11 RX ADMIN — AMPICILLIN SODIUM AND SULBACTAM SODIUM 3 G: 2; 1 INJECTION, POWDER, FOR SOLUTION INTRAMUSCULAR; INTRAVENOUS at 12:11

## 2022-11-11 RX ADMIN — SENNOSIDES AND DOCUSATE SODIUM 1 TABLET: 50; 8.6 TABLET ORAL at 09:11

## 2022-11-11 NOTE — PROGRESS NOTES
11/11/22-Admitted to the hospital on 11/08/22 for pharyngectomy, glossectomy and creation of free flap and skin graft.Malignant neoplasm of base of tongue. Transferred to ICU after procedure. Will continue to follow for discharge.   11/15/22- 6 days post op. NATHALIE step down yesterday. Called and spoke to the wife, Janel. Removed both jugulars per wife. He walked the hallway today. He is in good spirits. She would like him to be on a blended diet along with the jevity. She touched base with Ana and dietician. Recommended that she speak tot the dietician at the hospital. Possible discharge on 11/17/22 .

## 2022-11-11 NOTE — ASSESSMENT & PLAN NOTE
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 Now s/p total glossectomy, pharyngectomy, and ALT free flap and STSG reconstruction on 11/9.        ENT/HNS:  - Q1H flap checks  - No neck ties   - Keep tracheostomy in place  - SLP evaluation  - Bacitracin ointemnt to incision sites   -Sign above bed + outside door stating patient is "neck breather" and "can not be intubated by mouth"  -Clean laryngectomy stoma as needed, or daily  -Fresh wound care, clean incision with peroxide/qtip followed by bacitracin BID  -Humidified O2 via trach collar     Neuro:   - Q1H neuro checks   - Monitor urinary output   - Pain: Multimodality PRN regimen initiated     Cardiac:  - HDS  - Page ENT before starting vasopressors  - H/O of paroxysmal a fib, will CTM      Pulmonary:   - PRN breathing treatments  - Humidified air per trach collar     Renal:   - monitor Is and Os  - Cr stable  - CTM UOP, required I/O x 1     Infectious Disease:  - Daily CBC  - Continue unasyn; 2/4     Hematology/Oncology:  - H/H stable  - Lovenox DVT prophylaxis      FEN/GI  - Postoperative hypoparathyroidism, iCal WNL    - Tums TID, calcitriol    - CTM    - Replace electrolytes as needed to keep K>4, Mg>2  - Bowel reg  - Protonix for GI prophylaxis  - Nausea control ondansetron, phenergan  - Continue trickle TFs   - Continue maintenance IVF      MSK  - Out of bed as tolerated  - Drain removed today   - Island dressing removed, bacitracin ointment to incision site      Dispo:  -Continue ICU care    "

## 2022-11-11 NOTE — ASSESSMENT & PLAN NOTE
  Neuro/Psych:   -- Sedation: None   -- Pain: Adequately controlled on arrival to the SICU. Gabapentin. PRN Norco and morphine.   -- Trazodone QHS              Cards:   -- HDS  -- left radial a-line in place      Pulm:   -- Goal O2 sat > 90%  -- Wean as able  -- pt on trach collar       Renal:  -- Keep mahan for strict I/O  -- BUN/Cr 21/0.8  -- Daily CMP, Mag, Phos   -- post-op calcium      FEN / GI:   -- D5 1/2 NS 100cc/hr   -- Replace lytes as needed  -- Nutrition: NPO  -- G tube in place, ok to give meds through G tube for now but will remain NPO      ID:   -- Tm: afebrile; f/u WBC in post op labs    -- Abx Unasyn post op      Heme/Onc:   -- F/U H/H in post labs   -- Daily CBC      Endo:   -- Gluc goal 140-180  -- SSI      PPx:   Feeding: NPO  Analgesia/Sedation: gabapentin, PRN norco and morphine   Thromboembolic prevention: lovenox  HOB >30: yes  Stress Ulcer ppx: N/A  Glucose control: Critical care goal 140-180 g/dl, ISS    Lines/Drains/Airway: Left leg drain, G tube, mahan, left radial a-line, neck penrose drains bl      Dispo/Code Status/Palliative:   -- Admit to SICU / Full Code  -- Discussed with staff Dr. Rios

## 2022-11-11 NOTE — SUBJECTIVE & OBJECTIVE
Interval History: Increase in swelling overnight. Required I/O overnight since mahan removal. Denies changes in vision.     Medications:  Continuous Infusions:   dextrose 5 % and 0.45 % NaCl Stopped (11/11/22 0539)     Scheduled Meds:   acetaminophen  999.0148 mg Per G Tube Q8H    ampicillin-sulbactim (UNASYN) IVPB  3 g Intravenous Q6H    calcitRIOL  0.25 mcg Per G Tube Daily    calcium carbonate  1,000 mg Per G Tube TID WM    enoxaparin  40 mg Subcutaneous Daily    gabapentin  300 mg Per G Tube TID    levothyroxine  100 mcg Per G Tube Before breakfast    mupirocin   Nasal BID    ondansetron  4 mg Intravenous Q12H    senna-docusate 8.6-50 mg  1 tablet Per G Tube BID    traZODone  50 mg Per G Tube QHS     PRN Meds:dextrose 10%, dextrose 10%, glucagon (human recombinant), insulin aspart U-100, morphine, naloxone, ondansetron, oxyCODONE, oxyCODONE, prochlorperazine, sodium chloride 0.9%     Review of patient's allergies indicates:   Allergen Reactions    Pollen extracts     Lovastatin Rash     Not confirmed but pt skeptical     Objective:     Vital Signs (24h Range):  Temp:  [98.2 °F (36.8 °C)-100 °F (37.8 °C)] 99.6 °F (37.6 °C)  Pulse:  [] 103  Resp:  [12-25] 18  SpO2:  [98 %-100 %] 100 %  BP: ()/(51-90) 129/75  Arterial Line BP: ()/(44-62) 86/61       Lines/Drains/Airways       Central Venous Catheter Line  Duration             Port A Cath Single Lumen left subclavian -- days              Drain  Duration                  Gastrostomy/Enterostomy 12/27/21 1152 Percutaneous endoscopic gastrostomy (PEG)  days         Gastrostomy/Enterostomy 06/09/22 1935 midline feeding 154 days         Closed/Suction Drain 11/09/22 1518 Left;Anterior Thigh Bulb 19 Fr. 1 day              Airway  Duration                  Surgical Airway Shiley Cuffed -- days         Laryngectomy (Do Not Remove) 06/09/22 1414 Laryngectomy stoma 154 days         Airway - Non-Surgical 11/09/22 0856 Coil Wire Tube 1 day               Peripheral Intravenous Line  Duration                  Peripheral IV - Single Lumen 11/09/22 0726 18 G Left Hand 1 day         Peripheral IV - Single Lumen 11/09/22 2041 20 G Right Antecubital 1 day                    Physical Exam  Constitutional:       General: He is not in acute distress.  HENT:      Head:      Comments: Diffuse facial edema bilaterally, worsened since last exam soft to palpation      Mouth/Throat:      Comments: Flap soft to palpation, sutures to FOM intact   Eyes:      Extraocular Movements: Extraocular movements intact.   Neck:      Comments: Trach in place to stoma   Skin graft to midline neck overlying flap, appropriate drainage  Staples c/d/I on lateral neck, penroses in place bilaterally   Doppler with good venous and arterial signals   Cardiovascular:      Rate and Rhythm: Normal rate and regular rhythm.   Pulmonary:      Effort: No respiratory distress.   Neurological:      Mental Status: He is alert.     Significant Labs:  CBC:   Recent Labs   Lab 11/10/22  0330   WBC 8.84   RBC 2.60*   HGB 8.2*   HCT 24.5*   *   MCV 94   MCH 31.5*   MCHC 33.5     CMP:   Recent Labs   Lab 11/10/22  0330 11/11/22  0602   *  --    CALCIUM 8.2*  --    ALBUMIN 3.1* 2.8*  2.8*   PROT 5.1*  --     136  136   K 4.3 3.5  3.5   CO2 21*  --     106  106   BUN 20  --    CREATININE 1.0  --    ALKPHOS 53*  --    ALT 8*  --    AST 16  --    BILITOT 1.6*  --        Significant Diagnostics:  None

## 2022-11-11 NOTE — PROGRESS NOTES
[facility]  Otorhinolaryngology-Head & Neck Surgery  Progress Note    Subjective:     Post-Op Info:  Procedure(s) (LRB):  TOTAL PHARYNGECTOMY  TOTAL GLOSSECTOMY (Bilateral)  CREATION, FREE FLAP, ALT (Left)  LARYNGOSCOPY, DIRECT  DISSECTION, NECK (Bilateral)  APPLICATION, GRAFT, SKIN, SPLIT-THICKNESS (Right)   2 Days Post-Op  Hospital Day: 3     Interval History: Increase in swelling overnight. Required I/O overnight since mahan removal. Denies changes in vision.     Medications:  Continuous Infusions:   dextrose 5 % and 0.45 % NaCl Stopped (11/11/22 0539)     Scheduled Meds:   acetaminophen  999.0148 mg Per G Tube Q8H    ampicillin-sulbactim (UNASYN) IVPB  3 g Intravenous Q6H    calcitRIOL  0.25 mcg Per G Tube Daily    calcium carbonate  1,000 mg Per G Tube TID WM    enoxaparin  40 mg Subcutaneous Daily    gabapentin  300 mg Per G Tube TID    levothyroxine  100 mcg Per G Tube Before breakfast    mupirocin   Nasal BID    ondansetron  4 mg Intravenous Q12H    senna-docusate 8.6-50 mg  1 tablet Per G Tube BID    traZODone  50 mg Per G Tube QHS     PRN Meds:dextrose 10%, dextrose 10%, glucagon (human recombinant), insulin aspart U-100, morphine, naloxone, ondansetron, oxyCODONE, oxyCODONE, prochlorperazine, sodium chloride 0.9%     Review of patient's allergies indicates:   Allergen Reactions    Pollen extracts     Lovastatin Rash     Not confirmed but pt skeptical     Objective:     Vital Signs (24h Range):  Temp:  [98.2 °F (36.8 °C)-100 °F (37.8 °C)] 99.6 °F (37.6 °C)  Pulse:  [] 103  Resp:  [12-25] 18  SpO2:  [98 %-100 %] 100 %  BP: ()/(51-90) 129/75  Arterial Line BP: ()/(44-62) 86/61       Lines/Drains/Airways       Central Venous Catheter Line  Duration             Port A Cath Single Lumen left subclavian -- days              Drain  Duration                  Gastrostomy/Enterostomy 12/27/21 1152 Percutaneous endoscopic gastrostomy (PEG)  days          Gastrostomy/Enterostomy 06/09/22 1935 midline feeding 154 days         Closed/Suction Drain 11/09/22 1518 Left;Anterior Thigh Bulb 19 Fr. 1 day              Airway  Duration                  Surgical Airway Shiley Cuffed -- days         Laryngectomy (Do Not Remove) 06/09/22 1414 Laryngectomy stoma 154 days         Airway - Non-Surgical 11/09/22 0856 Coil Wire Tube 1 day              Peripheral Intravenous Line  Duration                  Peripheral IV - Single Lumen 11/09/22 0726 18 G Left Hand 1 day         Peripheral IV - Single Lumen 11/09/22 2041 20 G Right Antecubital 1 day                    Physical Exam  Constitutional:       General: He is not in acute distress.  HENT:      Head:      Comments: Diffuse facial edema bilaterally, worsened since last exam soft to palpation      Mouth/Throat:      Comments: Flap soft to palpation, sutures to FOM intact   Eyes:      Extraocular Movements: Extraocular movements intact.   Neck:      Comments: Trach in place to stoma   Skin graft to midline neck overlying flap, appropriate drainage  Staples c/d/I on lateral neck, penroses in place bilaterally   Doppler with good venous and arterial signals   Cardiovascular:      Rate and Rhythm: Normal rate and regular rhythm.   Pulmonary:      Effort: No respiratory distress.   Neurological:      Mental Status: He is alert.     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 Now s/p total glossectomy, pharyngectomy, and ALT free flap and STSG reconstruction on 11/9.        ENT/HNS:  - Q1H flap checks  - No neck ties   - Keep tracheostomy in place  - SLP evaluation  - Bacitracin ointemnt to incision sites   -Sign above bed + outside door stating patient is "neck breather" and "can not be intubated by mouth"  -Clean laryngectomy stoma as needed, or daily  -Fresh wound care, clean incision with peroxide/qtip followed by bacitracin BID  -Humidified O2 via trach collar     Neuro:   - Q1H neuro checks   - Monitor urinary output   - Pain: Multimodality PRN regimen initiated     Cardiac:  - HDS  - Page ENT before starting vasopressors  - H/O of paroxysmal a fib, will CTM      Pulmonary:   - PRN breathing treatments  - Humidified air per trach collar     Renal:   - monitor Is and Os  - Cr stable  - CTM UOP, required I/O x 1     Infectious Disease:  - Daily CBC  - Continue unasyn; 2/4     Hematology/Oncology:  - H/H stable  - Lovenox DVT prophylaxis      FEN/GI  - Postoperative hypoparathyroidism, iCal WNL    - Tums TID, calcitriol    - CTM    - Replace electrolytes as needed to keep K>4, Mg>2  - Bowel reg  - Protonix for GI prophylaxis  - Nausea control ondansetron, phenergan  - Continue trickle TFs   - Continue maintenance IVF      MSK  - Out of bed as tolerated  - Drain removed today   - Island dressing removed, bacitracin ointment to incision site      Dispo:  -Continue ICU care          Jo Ann Bryant MD  Otorhinolaryngology-Head & Neck Surgery  [facility]  "

## 2022-11-11 NOTE — SUBJECTIVE & OBJECTIVE
Interval History/Significant Events: After mahan removed, patient retaining urine. In and out cath removed 500cc urine.    Follow-up For: Procedure(s) (LRB):  TOTAL PHARYNGECTOMY  TOTAL GLOSSECTOMY (Bilateral)  CREATION, FREE FLAP, ALT (Left)  LARYNGOSCOPY, DIRECT  DISSECTION, NECK (Bilateral)  APPLICATION, GRAFT, SKIN, SPLIT-THICKNESS (Right)    Post-Operative Day: 2 Days Post-Op    Objective:     Vital Signs (Most Recent):  Temp: 99.6 °F (37.6 °C) (11/11/22 0400)  Pulse: 104 (11/11/22 0645)  Resp: 14 (11/11/22 0656)  BP: 136/69 (11/11/22 0600)  SpO2: 100 % (11/11/22 0645) Vital Signs (24h Range):  Temp:  [98.2 °F (36.8 °C)-100 °F (37.8 °C)] 99.6 °F (37.6 °C)  Pulse:  [] 104  Resp:  [12-25] 14  SpO2:  [98 %-100 %] 100 %  BP: ()/(51-90) 136/69  Arterial Line BP: ()/(44-62) 86/61     Weight: 66.2 kg (145 lb 15.1 oz)  Body mass index is 23.56 kg/m².      Intake/Output Summary (Last 24 hours) at 11/11/2022 0744  Last data filed at 11/11/2022 0700  Gross per 24 hour   Intake 1868.56 ml   Output 2000 ml   Net -131.44 ml     Physical Exam  Constitutional:       General: He is not in acute distress.  HENT:      Head:      Comments: Diffuse facial edema bilaterally, worsened since last exam soft to palpation      Mouth/Throat:      Comments: Flap soft to palpation, sutures to FOM intact   Eyes:      Extraocular Movements: Extraocular movements intact.   Neck:      Comments: Trach in place to stoma   Skin graft to midline neck overlying flap, appropriate drainage  Staples c/d/I on lateral neck, penroses in place bilaterally   Doppler with good venous and arterial signals   Cardiovascular:      Rate and Rhythm: Normal rate and regular rhythm.   Pulmonary:      Effort: No respiratory distress.   Neurological:      Mental Status: He is alert.   MSK  Left lower ext. Incision staples CDI with GLENN drain in place putting out serosanguinous discharge     Vents:  Oxygen Concentration (%): 28 (11/11/22  0329)    Lines/Drains/Airways       Central Venous Catheter Line  Duration             Port A Cath Single Lumen left subclavian -- days              Drain  Duration                  Gastrostomy/Enterostomy 12/27/21 1152 Percutaneous endoscopic gastrostomy (PEG)  days         Gastrostomy/Enterostomy 06/09/22 1935 midline feeding 154 days         Closed/Suction Drain 11/09/22 1518 Left;Anterior Thigh Bulb 19 Fr. 1 day              Airway  Duration                  Surgical Airway Shiley Cuffed -- days         Laryngectomy (Do Not Remove) 06/09/22 1414 Laryngectomy stoma 154 days         Airway - Non-Surgical 11/09/22 0856 Coil Wire Tube 1 day              Peripheral Intravenous Line  Duration                  Peripheral IV - Single Lumen 11/09/22 0726 18 G Left Hand 2 days         Peripheral IV - Single Lumen 11/09/22 2041 20 G Right Antecubital 1 day                    Significant Labs:    CBC/Anemia Profile:  Recent Labs   Lab 11/09/22  1738 11/09/22  2031 11/10/22  0330   WBC  --  11.28 8.84   HGB  --  9.1* 8.2*   HCT 25* 26.8* 24.5*   PLT  --  152 129*   MCV  --  95 94   RDW  --  14.6* 14.9*        Chemistries:  Recent Labs   Lab 11/09/22  2031 11/10/22  0330 11/11/22  0602    140 136  136   K 5.1 4.3 3.5  3.5   * 110 106  106   CO2 14* 21* 19*  19*   BUN 20 20 21  21   CREATININE 0.9 1.0 0.8  0.8   CALCIUM 9.2 8.2* 7.3*  7.3*   ALBUMIN 3.2* 3.1* 2.8*  2.8*   PROT 5.4* 5.1* 5.2*  5.2*   BILITOT 2.6* 1.6* 0.7  0.7   ALKPHOS 60 53* 52*  52*   ALT 9* 8* 8*  8*   AST 20 16 17  17   MG 1.8  --   --    PHOS 5.4*  --   --        All pertinent labs within the past 24 hours have been reviewed.    Significant Imaging:  I have reviewed all pertinent imaging results/findings within the past 24 hours.

## 2022-11-11 NOTE — PROGRESS NOTES
[facility]  Critical Care - Surgery  Progress Note    Patient Name: Cyrus Batres Jr.  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 He presents to the OR today with ENT for Pharyngectomy, glossectomy, and free flap creation. Pt being admitted directly to the SICU from the OR. Of note, per ENT pt has trach in place to avoid secretions going down into his lungs. Pt also has venous and arterial doppler wires placed within the surgical site for q1H monitoring. Also informed to expect extreme swelling in the post op period.      On arrival to the SICU, pt HDS and satting 100% via trach collar on RA. Pt received 4L of crystalloid during the case along with one unit of pRBCs. He is off of vasopressors on arrival as well. He has a left radial a-line in place for BP monitoring. He has a left leg drain from graft taken and right leg skin graft wound with surgical bandage in place. Pt also has G tube in place along with mahan for UOP monitoring.      Interval History/Significant Events: After mahan removed, patient retaining urine. In and out cath removed 500cc urine.    Follow-up For: Procedure(s) (LRB):  TOTAL PHARYNGECTOMY  TOTAL GLOSSECTOMY (Bilateral)  CREATION, FREE FLAP, ALT (Left)  LARYNGOSCOPY, DIRECT  DISSECTION, NECK (Bilateral)  APPLICATION, GRAFT, SKIN, SPLIT-THICKNESS (Right)    Post-Operative Day: 2 Days Post-Op    Objective:     Vital Signs (Most Recent):  Temp: 99.6 °F (37.6 °C) (11/11/22 0400)  Pulse: 104 (11/11/22 0645)  Resp: 14 (11/11/22 0656)  BP: 136/69 (11/11/22 0600)  SpO2: 100 %  (11/11/22 0645) Vital Signs (24h Range):  Temp:  [98.2 °F (36.8 °C)-100 °F (37.8 °C)] 99.6 °F (37.6 °C)  Pulse:  [] 104  Resp:  [12-25] 14  SpO2:  [98 %-100 %] 100 %  BP: ()/(51-90) 136/69  Arterial Line BP: ()/(44-62) 86/61     Weight: 66.2 kg (145 lb 15.1 oz)  Body mass index is 23.56 kg/m².      Intake/Output Summary (Last 24 hours) at 11/11/2022 0744  Last data filed at 11/11/2022 0700  Gross per 24 hour   Intake 1868.56 ml   Output 2000 ml   Net -131.44 ml     Physical Exam  Constitutional:       General: He is not in acute distress.  HENT:      Head:      Comments: Diffuse facial edema bilaterally, worsened since last exam soft to palpation      Mouth/Throat:      Comments: Flap soft to palpation, sutures to FOM intact   Eyes:      Extraocular Movements: Extraocular movements intact.   Neck:      Comments: Trach in place to stoma   Skin graft to midline neck overlying flap, appropriate drainage  Staples c/d/I on lateral neck, penroses in place bilaterally   Doppler with good venous and arterial signals   Cardiovascular:      Rate and Rhythm: Normal rate and regular rhythm.   Pulmonary:      Effort: No respiratory distress.   Neurological:      Mental Status: He is alert.   MSK  Left lower ext. Incision staples CDI with GLENN drain in place putting out serosanguinous discharge     Vents:  Oxygen Concentration (%): 28 (11/11/22 0329)    Lines/Drains/Airways       Central Venous Catheter Line  Duration             Port A Cath Single Lumen left subclavian -- days              Drain  Duration                  Gastrostomy/Enterostomy 12/27/21 1152 Percutaneous endoscopic gastrostomy (PEG)  days         Gastrostomy/Enterostomy 06/09/22 1935 midline feeding 154 days         Closed/Suction Drain 11/09/22 1518 Left;Anterior Thigh Bulb 19 Fr. 1 day              Airway  Duration                  Surgical Airway Shiley Cuffed -- days         Laryngectomy (Do Not Remove) 06/09/22 1414 Laryngectomy  stoma 154 days         Airway - Non-Surgical 11/09/22 0856 Coil Wire Tube 1 day              Peripheral Intravenous Line  Duration                  Peripheral IV - Single Lumen 11/09/22 0726 18 G Left Hand 2 days         Peripheral IV - Single Lumen 11/09/22 2041 20 G Right Antecubital 1 day                    Significant Labs:    CBC/Anemia Profile:  Recent Labs   Lab 11/09/22  1738 11/09/22  2031 11/10/22  0330   WBC  --  11.28 8.84   HGB  --  9.1* 8.2*   HCT 25* 26.8* 24.5*   PLT  --  152 129*   MCV  --  95 94   RDW  --  14.6* 14.9*        Chemistries:  Recent Labs   Lab 11/09/22  2031 11/10/22  0330 11/11/22  0602    140 136  136   K 5.1 4.3 3.5  3.5   * 110 106  106   CO2 14* 21* 19*  19*   BUN 20 20 21  21   CREATININE 0.9 1.0 0.8  0.8   CALCIUM 9.2 8.2* 7.3*  7.3*   ALBUMIN 3.2* 3.1* 2.8*  2.8*   PROT 5.4* 5.1* 5.2*  5.2*   BILITOT 2.6* 1.6* 0.7  0.7   ALKPHOS 60 53* 52*  52*   ALT 9* 8* 8*  8*   AST 20 16 17  17   MG 1.8  --   --    PHOS 5.4*  --   --        All pertinent labs within the past 24 hours have been reviewed.    Significant Imaging:  I have reviewed all pertinent imaging results/findings within the past 24 hours.    Assessment/Plan:     * Malignant neoplasm of base of tongue    Neuro/Psych:   -- Sedation: None   -- Pain: Adequately controlled on arrival to the SICU. Gabapentin. PRN Norco and morphine.   -- Trazodone QHS              Cards:   -- HDS  -- left radial a-line in place      Pulm:   -- Goal O2 sat > 90%  -- Wean as able  -- pt on trach collar       Renal:  -- Keep mahan for strict I/O  -- BUN/Cr 21/0.8  -- Daily CMP, Mag, Phos   -- post-op calcium      FEN / GI:   -- D5 1/2 NS 100cc/hr   -- Replace lytes as needed  -- Nutrition: NPO  -- G tube in place, ok to give meds through G tube for now but will remain NPO      ID:   -- Tm: afebrile; f/u WBC in post op labs    -- Abx Unasyn post op      Heme/Onc:   -- F/U H/H in post labs   -- Daily CBC      Endo:   -- Gluc  goal 140-180  -- SSI      PPx:   Feeding: NPO  Analgesia/Sedation: gabapentin, PRN norco and morphine   Thromboembolic prevention: lovenox  HOB >30: yes  Stress Ulcer ppx: N/A  Glucose control: Critical care goal 140-180 g/dl, ISS    Lines/Drains/Airway: Left leg drain, G tube, mahan, left radial a-line, neck penrose drains bl      Dispo/Code Status/Palliative:   -- step down today depending on primary team  -- Discussed with staff Dr. Rios            Critical Care Daily Checklist:    A: Awake: RASS Goal/Actual Goal: RASS Goal: (P) 0-->alert and calm  Actual: Sosa Agitation Sedation Scale (RASS): Alert and calm   B: Spontaneous Breathing Trial Performed?     C: SAT & SBT Coordinated?  NA                      D: Delirium: CAM-ICU Overall CAM-ICU: Negative   E: Early Mobility Performed? No   F: Feeding Goal: Goals: Meet % of kcal/protein needs by RD f/u date  Status: Nutrition Goal Status: new   Current Diet Order   Procedures    Diet NPO      AS: Analgesia/Sedation See above   T: Thromboembolic Prophylaxis See above   H: HOB > 300 Yes   U: Stress Ulcer Prophylaxis (if needed) NA   G: Glucose Control    B: Bowel Function     I: Indwelling Catheter (Lines & Mahan) Necessity    D: De-escalation of Antimicrobials/Pharmacotherapies     Plan for the day/ETD     Code Status:  Family/Goals of Care: Prior         Critical secondary to Patient has a condition that poses threat to life and bodily function: post-op ALT flap for pharyngectomy      Critical care was time spent personally by me on the following activities: development of treatment plan with patient or surrogate and bedside caregivers, discussions with consultants, evaluation of patient's response to treatment, examination of patient, ordering and performing treatments and interventions, ordering and review of laboratory studies, ordering and review of radiographic studies, pulse oximetry, re-evaluation of patient's condition.  This critical care time did  not overlap with that of any other provider or involve time for any procedures.     ISRA EAGLE MD  Critical Care - Surgery  [facility]

## 2022-11-11 NOTE — PLAN OF CARE
"      SICU PLAN OF CARE NOTE    Dx: Malignant neoplasm of base of tongue    Shift Events: IVFs d/c'd. TF increased and tolerating @ goal rate of 45 ml/hr. Juarez placed for retention with adequate UOP after insertion. Pale but Strong pulses to FLAP, with Q1hr FLAP checks. MD aware and states remains unchanged in color.    Goals of Care: MAP >65    Neuro: AAO x4, Follows Commands, and Moves All Extremities    Vital Signs: BP (!) 99/57   Pulse 99   Temp 99.6 °F (37.6 °C) (Axillary)   Resp 17   Ht 5' 6" (1.676 m)   Wt 66.2 kg (145 lb 15.1 oz)   SpO2 99%   BMI 23.56 kg/m²     Respiratory: Room Air (10L humidified Medical Air per TC)    Diet: Tube Feeds    Gtts: none    Urine Output: Urinary Catheter 895 cc/shift    Drains: GLENN Drain, total output 20 cc / shift    Flap Checks Q1hr- implanted doppler- strong pulses    Cardiac: NSR -ST  bpm     Labs/Accuchecks: Q8hr Ionized Ca. Daily labs    Skin: No new skin breakdown noted. Left LE incision with staples CDI and HENRY, Right thigh skin graft with petroleum gauze intact, and Neck incision and FLAP HENRY. See assessment for details and orders. Foams in place. Heel boots in use. Waffle inflated and bed functioning properly. TQ2 with wedge, maintain head in neutral position.       "

## 2022-11-11 NOTE — NURSING
Pl bladder scanned for possible residual; nothing seen on bladder scan, but some s/s of incontinence noted on wet-absorb pad; pt stated he has not urinated since removal of Juarez cath; will closely monitor.

## 2022-11-11 NOTE — PT/OT/SLP PROGRESS
Speech Language Pathology Treatment    Patient Name:  Cyrus Batres Jr.   MRN:  [id number]  Admitting Diagnosis: Malignant neoplasm of base of tongue    PT IS A NECK BREATHER. DO NOT ORALLY INTUBATE.    Recommendations:             Diet recommendations:  NPO, Liquid Diet Level: NPO   General Precautions: Standard, NPO, fall  Communication strategies:   written language, gestures    Subjective     Awake/alert  Family at bedside    Pain/Comfort:  Pain Rating 1: 0/10  Pain Rating Post-Intervention 1: 0/10    Respiratory Status:  trach collar    Objective:     Has the patient been evaluated by SLP for swallowing?   No  Keep patient NPO? Yes     Pt with cuffed trach in stoma with mp supplies at bedside. Pt with significant post surgical edema. Unable to mouth words at this time, but did communicate that he is getting his wants/needs met with gestures. Boogie board in bed with pt. Appropriate signage in room. SLP will follow up.     Assessment:     Cyrus Batres Jr. is a 72 y.o. male s/p glossectomy and pharyngectomy. Pt with laryngectomy January 2022.    Goals:   Multidisciplinary Problems       SLP Goals          Problem: SLP    Goal Priority Disciplines Outcome   SLP Goal     SLP Ongoing, Progressing   Description: Speech-Language Pathology Goals  Goals to be met by 11/17/22  1. Patient will demonstrate independent stoma care and knowledge of laryngectomy info.                        Plan:     Patient to be seen:  3 x/week   Plan of Care expires:  12/10/22  Plan of Care reviewed with:  family, patient   SLP Follow-Up:  Yes       Discharge recommendations:   (no further ST anticipated)       Time Tracking:     SLP Treatment Date:   11/11/22  Speech Start Time:  0800  Speech Stop Time:  0806     Speech Total Time (min):  6 min    Billable Minutes: Speech Therapy Individual 6    11/11/2022

## 2022-11-12 LAB
ALBUMIN SERPL BCP-MCNC: 2.5 G/DL (ref 3.5–5.2)
ALP SERPL-CCNC: 50 U/L (ref 55–135)
ALT SERPL W/O P-5'-P-CCNC: 9 U/L (ref 10–44)
ANION GAP SERPL CALC-SCNC: 10 MMOL/L (ref 8–16)
AST SERPL-CCNC: 17 U/L (ref 10–40)
BASOPHILS # BLD AUTO: 0.01 K/UL (ref 0–0.2)
BASOPHILS NFR BLD: 0.2 % (ref 0–1.9)
BILIRUB SERPL-MCNC: 0.5 MG/DL (ref 0.1–1)
BUN SERPL-MCNC: 23 MG/DL (ref 8–23)
CA-I BLDV-SCNC: 1.07 MMOL/L (ref 1.06–1.42)
CA-I BLDV-SCNC: 1.08 MMOL/L (ref 1.06–1.42)
CA-I BLDV-SCNC: 1.2 MMOL/L (ref 1.06–1.42)
CALCIUM SERPL-MCNC: 8 MG/DL (ref 8.7–10.5)
CHLORIDE SERPL-SCNC: 106 MMOL/L (ref 95–110)
CO2 SERPL-SCNC: 21 MMOL/L (ref 23–29)
CREAT SERPL-MCNC: 0.8 MG/DL (ref 0.5–1.4)
DIFFERENTIAL METHOD: ABNORMAL
EOSINOPHIL # BLD AUTO: 0.1 K/UL (ref 0–0.5)
EOSINOPHIL NFR BLD: 2 % (ref 0–8)
ERYTHROCYTE [DISTWIDTH] IN BLOOD BY AUTOMATED COUNT: 13.6 % (ref 11.5–14.5)
EST. GFR  (NO RACE VARIABLE): >60 ML/MIN/1.73 M^2
GLUCOSE SERPL-MCNC: 162 MG/DL (ref 70–110)
HCT VFR BLD AUTO: 22.5 % (ref 40–54)
HGB BLD-MCNC: 7.6 G/DL (ref 14–18)
IMM GRANULOCYTES # BLD AUTO: 0.05 K/UL (ref 0–0.04)
IMM GRANULOCYTES NFR BLD AUTO: 0.8 % (ref 0–0.5)
LYMPHOCYTES # BLD AUTO: 0.5 K/UL (ref 1–4.8)
LYMPHOCYTES NFR BLD: 7.3 % (ref 18–48)
MAGNESIUM SERPL-MCNC: 2 MG/DL (ref 1.6–2.6)
MCH RBC QN AUTO: 32.2 PG (ref 27–31)
MCHC RBC AUTO-ENTMCNC: 33.8 G/DL (ref 32–36)
MCV RBC AUTO: 95 FL (ref 82–98)
MONOCYTES # BLD AUTO: 0.4 K/UL (ref 0.3–1)
MONOCYTES NFR BLD: 5.3 % (ref 4–15)
NEUTROPHILS # BLD AUTO: 5.5 K/UL (ref 1.8–7.7)
NEUTROPHILS NFR BLD: 84.4 % (ref 38–73)
NRBC BLD-RTO: 0 /100 WBC
PHOSPHATE SERPL-MCNC: 3.1 MG/DL (ref 2.7–4.5)
PLATELET # BLD AUTO: 149 K/UL (ref 150–450)
PMV BLD AUTO: 11.1 FL (ref 9.2–12.9)
POTASSIUM SERPL-SCNC: 4.2 MMOL/L (ref 3.5–5.1)
PROT SERPL-MCNC: 5.4 G/DL (ref 6–8.4)
RBC # BLD AUTO: 2.36 M/UL (ref 4.6–6.2)
SODIUM SERPL-SCNC: 137 MMOL/L (ref 136–145)
SODIUM SERPL-SCNC: 137 MMOL/L (ref 136–145)
WBC # BLD AUTO: 6.55 K/UL (ref 3.9–12.7)

## 2022-11-12 PROCEDURE — 99900026 HC AIRWAY MAINTENANCE (STAT)

## 2022-11-12 PROCEDURE — 25000003 PHARM REV CODE 250: Performed by: STUDENT IN AN ORGANIZED HEALTH CARE EDUCATION/TRAINING PROGRAM

## 2022-11-12 PROCEDURE — 80053 COMPREHEN METABOLIC PANEL: CPT | Performed by: STUDENT IN AN ORGANIZED HEALTH CARE EDUCATION/TRAINING PROGRAM

## 2022-11-12 PROCEDURE — 25000242 PHARM REV CODE 250 ALT 637 W/ HCPCS

## 2022-11-12 PROCEDURE — 85025 COMPLETE CBC W/AUTO DIFF WBC: CPT | Performed by: STUDENT IN AN ORGANIZED HEALTH CARE EDUCATION/TRAINING PROGRAM

## 2022-11-12 PROCEDURE — 99233 PR SUBSEQUENT HOSPITAL CARE,LEVL III: ICD-10-PCS | Mod: GC,,, | Performed by: SURGERY

## 2022-11-12 PROCEDURE — 27200966 HC CLOSED SUCTION SYSTEM

## 2022-11-12 PROCEDURE — 27000221 HC OXYGEN, UP TO 24 HOURS

## 2022-11-12 PROCEDURE — 20000000 HC ICU ROOM

## 2022-11-12 PROCEDURE — 84295 ASSAY OF SERUM SODIUM: CPT | Performed by: STUDENT IN AN ORGANIZED HEALTH CARE EDUCATION/TRAINING PROGRAM

## 2022-11-12 PROCEDURE — 99233 SBSQ HOSP IP/OBS HIGH 50: CPT | Mod: GC,,, | Performed by: SURGERY

## 2022-11-12 PROCEDURE — 94761 N-INVAS EAR/PLS OXIMETRY MLT: CPT

## 2022-11-12 PROCEDURE — 25000003 PHARM REV CODE 250

## 2022-11-12 PROCEDURE — 99900035 HC TECH TIME PER 15 MIN (STAT)

## 2022-11-12 PROCEDURE — 82330 ASSAY OF CALCIUM: CPT | Performed by: STUDENT IN AN ORGANIZED HEALTH CARE EDUCATION/TRAINING PROGRAM

## 2022-11-12 PROCEDURE — 25000003 PHARM REV CODE 250: Performed by: SURGERY

## 2022-11-12 PROCEDURE — 83735 ASSAY OF MAGNESIUM: CPT | Performed by: OTOLARYNGOLOGY

## 2022-11-12 PROCEDURE — 25000242 PHARM REV CODE 250 ALT 637 W/ HCPCS: Performed by: SURGERY

## 2022-11-12 PROCEDURE — 63600175 PHARM REV CODE 636 W HCPCS: Performed by: STUDENT IN AN ORGANIZED HEALTH CARE EDUCATION/TRAINING PROGRAM

## 2022-11-12 PROCEDURE — 84100 ASSAY OF PHOSPHORUS: CPT | Performed by: OTOLARYNGOLOGY

## 2022-11-12 PROCEDURE — 82330 ASSAY OF CALCIUM: CPT | Mod: 91 | Performed by: STUDENT IN AN ORGANIZED HEALTH CARE EDUCATION/TRAINING PROGRAM

## 2022-11-12 RX ADMIN — OXYCODONE HYDROCHLORIDE 10 MG: 5 SOLUTION ORAL at 11:11

## 2022-11-12 RX ADMIN — TRAZODONE HYDROCHLORIDE 50 MG: 50 TABLET ORAL at 10:11

## 2022-11-12 RX ADMIN — ACETAMINOPHEN 999.01 MG: 650 SOLUTION ORAL at 05:11

## 2022-11-12 RX ADMIN — CALCITRIOL CAPSULES 0.25 MCG 0.25 MCG: 0.25 CAPSULE ORAL at 08:11

## 2022-11-12 RX ADMIN — ENOXAPARIN SODIUM 40 MG: 100 INJECTION SUBCUTANEOUS at 05:11

## 2022-11-12 RX ADMIN — AMPICILLIN SODIUM AND SULBACTAM SODIUM 3 G: 2; 1 INJECTION, POWDER, FOR SOLUTION INTRAMUSCULAR; INTRAVENOUS at 09:11

## 2022-11-12 RX ADMIN — GABAPENTIN 300 MG: 300 CAPSULE ORAL at 09:11

## 2022-11-12 RX ADMIN — SENNOSIDES AND DOCUSATE SODIUM 1 TABLET: 50; 8.6 TABLET ORAL at 09:11

## 2022-11-12 RX ADMIN — CALCIUM CARBONATE 1000 MG: 1250 SUSPENSION ORAL at 11:11

## 2022-11-12 RX ADMIN — MUPIROCIN: 20 OINTMENT TOPICAL at 08:11

## 2022-11-12 RX ADMIN — GABAPENTIN 300 MG: 300 CAPSULE ORAL at 03:11

## 2022-11-12 RX ADMIN — LEVOTHYROXINE SODIUM 100 MCG: 100 TABLET ORAL at 05:11

## 2022-11-12 RX ADMIN — AMPICILLIN SODIUM AND SULBACTAM SODIUM 3 G: 2; 1 INJECTION, POWDER, FOR SOLUTION INTRAMUSCULAR; INTRAVENOUS at 03:11

## 2022-11-12 RX ADMIN — CALCIUM CARBONATE 1000 MG: 1250 SUSPENSION ORAL at 08:11

## 2022-11-12 RX ADMIN — ACETAMINOPHEN 999.01 MG: 650 SOLUTION ORAL at 02:11

## 2022-11-12 RX ADMIN — GABAPENTIN 300 MG: 300 CAPSULE ORAL at 08:11

## 2022-11-12 RX ADMIN — SENNOSIDES AND DOCUSATE SODIUM 1 TABLET: 50; 8.6 TABLET ORAL at 08:11

## 2022-11-12 RX ADMIN — ACETAMINOPHEN 999.01 MG: 650 SOLUTION ORAL at 09:11

## 2022-11-12 RX ADMIN — CALCIUM GLUCONATE 1 G: 20 INJECTION, SOLUTION INTRAVENOUS at 02:11

## 2022-11-12 RX ADMIN — ONDANSETRON 4 MG: 2 INJECTION INTRAMUSCULAR; INTRAVENOUS at 08:11

## 2022-11-12 RX ADMIN — AMPICILLIN SODIUM AND SULBACTAM SODIUM 3 G: 2; 1 INJECTION, POWDER, FOR SOLUTION INTRAMUSCULAR; INTRAVENOUS at 04:11

## 2022-11-12 RX ADMIN — OXYCODONE HYDROCHLORIDE 5 MG: 5 SOLUTION ORAL at 06:11

## 2022-11-12 RX ADMIN — CALCIUM CARBONATE 1000 MG: 1250 SUSPENSION ORAL at 05:11

## 2022-11-12 RX ADMIN — MUPIROCIN: 20 OINTMENT TOPICAL at 09:11

## 2022-11-12 RX ADMIN — CALCIUM GLUCONATE 1 G: 20 INJECTION, SOLUTION INTRAVENOUS at 09:11

## 2022-11-12 NOTE — NURSING
Dr. James roundroberta at bedside. Assesses FLAP with strong doppler pulses and color unchanged and aware. Attempted to exchange shiley trach with laryn-tube but pt still has increased secretions around stoma and facial swelling has not reduced much. MD to keep shiley trach in place at this time. No acute respiratory or distress noted. VSS on 5L humidified medical air. WCTM

## 2022-11-12 NOTE — ASSESSMENT & PLAN NOTE
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 Now s/p total glossectomy, pharyngectomy, and ALT free flap and STSG reconstruction on 11/9.        ENT/HNS:  - Q1H flap checks given significant facial edema and concern for development of cerebral edema  - No neck ties   - Keep tracheostomy in place  - SLP evaluation  - Bacitracin ointemnt to incision sites   -Sign above bed + outside door stating patient is "neck breather" and "can not be intubated by mouth"  -Clean laryngectomy stoma as needed, or daily  -Fresh wound care, clean incision with peroxide/qtip followed by bacitracin BID  -Humidified O2 via trach collar     Neuro:   - Q1H neuro checks   - Monitor urinary output   - Pain: Multimodality PRN regimen initiated     Cardiac:  - HDS  - Page ENT before starting vasopressors  - H/O of paroxysmal a fib, will CTM      Pulmonary:   - PRN breathing treatments  - Humidified air per trach collar     Renal:   - monitor Is and Os  - Cr stable  - Juarez replaced 11/11     Infectious Disease:  - Daily CBC  - Continue unasyn     Hematology/Oncology:  - H/H stable  - Lovenox DVT prophylaxis      FEN/GI  - Postoperative hypoparathyroidism, iCal WNL    - Tums TID, calcitriol    - CTM    - Replace electrolytes as needed to keep K>4, Mg>2  - Bowel reg  - Protonix for GI prophylaxis  - Nausea control ondansetron, phenergan  - Continue trickle TFs   - Continue maintenance IVF      MSK  - Out of bed as tolerated  - Drain removed  - Island dressing removed, bacitracin ointment to incision site      Dispo:  -Continue ICU care through weekend given continued worsening of edema and need for neuro checks    "

## 2022-11-12 NOTE — PROGRESS NOTES
[facility] - [facility]  Otorhinolaryngology-Head & Neck Surgery  Progress Note    Subjective:     Post-Op Info:  Procedure(s) (LRB):  TOTAL PHARYNGECTOMY  TOTAL GLOSSECTOMY (Bilateral)  CREATION, FREE FLAP, ALT (Left)  LARYNGOSCOPY, DIRECT  DISSECTION, NECK (Bilateral)  APPLICATION, GRAFT, SKIN, SPLIT-THICKNESS (Right)   3 Days Post-Op  Hospital Day: 4     Interval History: Juarez replaced after voiding trial. No other issues overnight.    Medications:  Continuous Infusions:  Scheduled Meds:   acetaminophen  999.0148 mg Per G Tube Q8H    ampicillin-sulbactim (UNASYN) IVPB  3 g Intravenous Q6H    calcitRIOL  0.25 mcg Per G Tube Daily    calcium carbonate  1,000 mg Per G Tube TID WM    enoxaparin  40 mg Subcutaneous Daily    gabapentin  300 mg Per G Tube TID    levothyroxine  100 mcg Per G Tube Before breakfast    mupirocin   Nasal BID    senna-docusate 8.6-50 mg  1 tablet Per G Tube BID    traZODone  50 mg Per G Tube QHS     PRN Meds:sodium chloride 0.9%, sodium chloride 0.9%, calcium gluconate IVPB, calcium gluconate IVPB, calcium gluconate IVPB, dextrose 10%, dextrose 10%, glucagon (human recombinant), insulin aspart U-100, magnesium sulfate IVPB, magnesium sulfate IVPB, morphine, naloxone, oxyCODONE, oxyCODONE, potassium chloride **AND** potassium chloride **AND** potassium chloride, prochlorperazine, sodium chloride 0.9%, sodium chloride 0.9%, sodium phosphate IVPB, sodium phosphate IVPB, sodium phosphate IVPB     Review of patient's allergies indicates:   Allergen Reactions    Pollen extracts     Lovastatin Rash     Not confirmed but pt skeptical     Objective:     Vital Signs (24h Range):  Temp:  [98.6 °F (37 °C)-99.8 °F (37.7 °C)] 98.6 °F (37 °C)  Pulse:  [] 85  Resp:  [11-27] 16  SpO2:  [96 %-100 %] 97 %  BP: ()/(50-79) 104/54     Date 11/12/22 0700 - 11/13/22 0659   Shift 8612-7934 1997-0572 6445-4211 24 Hour Total   INTAKE   I.V.(mL/kg) 17.9(0.2)   17.9(0.2)   NG/    330   IV Piggyback 51.8   51.8   Shift Total(mL/kg) 399.7(5.4)   399.7(5.4)   OUTPUT   Urine(mL/kg/hr) 275   275   Shift Total(mL/kg) 275(3.7)   275(3.7)   Weight (kg) 74.2 74.2 74.2 74.2     Lines/Drains/Airways       Central Venous Catheter Line  Duration             Port A Cath Single Lumen left subclavian -- days              Drain  Duration                  Gastrostomy/Enterostomy 06/09/22 1935 midline feeding 155 days         Closed/Suction Drain 11/09/22 1518 Left;Anterior Thigh Bulb 19 Fr. 2 days         Urethral Catheter 11/11/22 1530 Straight-tip 16 Fr. <1 day              Airway  Duration                  Surgical Airway Shiley Cuffed -- days         Laryngectomy (Do Not Remove) 06/09/22 1414 Laryngectomy stoma 155 days         Airway - Non-Surgical 11/09/22 0856 Coil Wire Tube 3 days              Peripheral Intravenous Line  Duration                  Peripheral IV - Single Lumen 11/09/22 0726 18 G Left Hand 3 days         Peripheral IV - Single Lumen 11/09/22 2041 20 G Right Antecubital 2 days                    Physical Exam  Constitutional:       General: He is not in acute distress.  HENT:      Head:      Comments: Diffuse facial edema bilaterally, soft to palpation     Mouth/Throat:      Comments: Flap soft to palpation, sutures to FOM intact   Eyes:      Extraocular Movements: Extraocular movements intact.   Neck:      Comments: Trach in place to stoma   Skin graft to midline neck overlying flap, appropriate drainage  Staples c/d/I on lateral neck, penroses in place bilaterally   Doppler with good venous and arterial signals   Cardiovascular:      Rate and Rhythm: Normal rate and regular rhythm.   Pulmonary:      Effort: No respiratory distress.   Neurological:      Mental Status: He is alert.     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 Now s/p total glossectomy, pharyngectomy, and ALT free flap and STSG reconstruction on 11/9.        ENT/HNS:  - Q1H flap checks given significant facial edema and concern for development of cerebral edema  - No neck ties   - Keep tracheostomy in place  - SLP evaluation  - Bacitracin ointemnt to incision sites   -Sign above bed + outside door stating patient is "neck breather" and "can not be intubated by mouth"  -Clean laryngectomy stoma as needed, or daily  -Fresh wound care, clean incision with peroxide/qtip followed by bacitracin BID  -Humidified O2 via trach collar     Neuro:   - Q1H neuro checks   - Monitor urinary output   - Pain: Multimodality PRN regimen initiated     Cardiac:  - HDS  - Page ENT before starting vasopressors  - H/O of paroxysmal a fib, will CTM      Pulmonary:   - PRN breathing treatments  - Humidified air per trach collar     Renal:   - monitor Is and Os  - Cr stable  - Juarez replaced 11/11     Infectious Disease:  - Daily CBC  - Continue unasyn     Hematology/Oncology:  - H/H stable  - Lovenox DVT prophylaxis      FEN/GI  - Postoperative hypoparathyroidism, iCal WNL    - Tums TID, calcitriol    - CTM    - Replace electrolytes as needed to keep K>4, Mg>2  - Bowel reg  - Protonix for GI prophylaxis  - Nausea control ondansetron, phenergan  - Continue trickle TFs   - Continue maintenance IVF      MSK  - Out of bed as tolerated  - Drain removed  - Island dressing removed, bacitracin ointment to incision site      Dispo:  -Continue ICU care through weekend given continued worsening of edema and need for neuro checks          Henna Black " MD Ean  Otorhinolaryngology-Head & Neck Surgery  [facility] - [facility]

## 2022-11-12 NOTE — PLAN OF CARE
NAEON.     Afebrile. VSS. NSR 80-90s. MAP > 65 without pressors. Sats > 97% on Trach Collar 5 L / 21%.     Neuro: oriented x 4, PERRLA, follows commands, moves all extremities, uses written communication board.     Flap check q1h. See flowsheets.     Juarez in place with adequate UOP.     Tube feeds @ goal-- 45 cc/hr.     L Leg GLENN drain with minimal serosanguineous output.     Skin: weight shift assistance provided throughout shift. Turned with wedge. No new breakdown noted. SCDs and heel boots on.     Call light in reach. Bed in low position and wheels locked. Spouse at bedside.     All labs reviewed and electrolytes replaced prn.

## 2022-11-12 NOTE — ASSESSMENT & PLAN NOTE
  Neuro/Psych:   -- Sedation: None   -- Pain: Adequately controlled on arrival to the SICU. Gabapentin. PRN Norco and morphine.   -- Trazodone QHS              Cards:   -- HDS  -- left radial a-line in place      Pulm:   -- Goal O2 sat > 90%  -- Wean as able  -- pt on trach collar       Renal:  -- Keep mahan for strict I/O  -- BUN/Cr 23/0.8  -- Daily CMP, Mag, Phos   -- post-op calcium      FEN / GI:   -- D5 1/2 NS 100cc/hr   -- Replace lytes as needed  -- Nutrition: NPO  -- G tube in place, ok to give meds through G tube for now but will remain NPO      ID:   -- Tm: afebrile; f/u WBC in post op labs    -- Abx Unasyn post op      Heme/Onc:   -- F/U H/H in post labs   -- Daily CBC      Endo:   -- Gluc goal 140-180  -- SSI      PPx:   Feeding: NPO  Analgesia/Sedation: gabapentin, PRN norco and morphine   Thromboembolic prevention: lovenox  HOB >30: yes  Stress Ulcer ppx: N/A  Glucose control: Critical care goal 140-180 g/dl, ISS    Lines/Drains/Airway: Left leg drain, G tube, mahan, left radial a-line, neck penrose drains bl      Dispo/Code Status/Palliative:   -- Admit to SICU / Full Code  -- Plan to step down after touching base with primary team  -- Discussed with staff Dr. Rios

## 2022-11-12 NOTE — SUBJECTIVE & OBJECTIVE
Interval History: Juarez replaced after voiding trial. No other issues overnight.    Medications:  Continuous Infusions:  Scheduled Meds:   acetaminophen  999.0148 mg Per G Tube Q8H    ampicillin-sulbactim (UNASYN) IVPB  3 g Intravenous Q6H    calcitRIOL  0.25 mcg Per G Tube Daily    calcium carbonate  1,000 mg Per G Tube TID WM    enoxaparin  40 mg Subcutaneous Daily    gabapentin  300 mg Per G Tube TID    levothyroxine  100 mcg Per G Tube Before breakfast    mupirocin   Nasal BID    senna-docusate 8.6-50 mg  1 tablet Per G Tube BID    traZODone  50 mg Per G Tube QHS     PRN Meds:sodium chloride 0.9%, sodium chloride 0.9%, calcium gluconate IVPB, calcium gluconate IVPB, calcium gluconate IVPB, dextrose 10%, dextrose 10%, glucagon (human recombinant), insulin aspart U-100, magnesium sulfate IVPB, magnesium sulfate IVPB, morphine, naloxone, oxyCODONE, oxyCODONE, potassium chloride **AND** potassium chloride **AND** potassium chloride, prochlorperazine, sodium chloride 0.9%, sodium chloride 0.9%, sodium phosphate IVPB, sodium phosphate IVPB, sodium phosphate IVPB     Review of patient's allergies indicates:   Allergen Reactions    Pollen extracts     Lovastatin Rash     Not confirmed but pt skeptical     Objective:     Vital Signs (24h Range):  Temp:  [98.6 °F (37 °C)-99.8 °F (37.7 °C)] 98.6 °F (37 °C)  Pulse:  [] 85  Resp:  [11-27] 16  SpO2:  [96 %-100 %] 97 %  BP: ()/(50-79) 104/54     Date 11/12/22 0700 - 11/13/22 0659   Shift 6879-8172 3362-5072 9869-5152 24 Hour Total   INTAKE   I.V.(mL/kg) 17.9(0.2)   17.9(0.2)   NG/   330   IV Piggyback 51.8   51.8   Shift Total(mL/kg) 399.7(5.4)   399.7(5.4)   OUTPUT   Urine(mL/kg/hr) 275   275   Shift Total(mL/kg) 275(3.7)   275(3.7)   Weight (kg) 74.2 74.2 74.2 74.2     Lines/Drains/Airways       Central Venous Catheter Line  Duration             Port A Cath Single Lumen left subclavian -- days              Drain  Duration                   Gastrostomy/Enterostomy 06/09/22 1935 midline feeding 155 days         Closed/Suction Drain 11/09/22 1518 Left;Anterior Thigh Bulb 19 Fr. 2 days         Urethral Catheter 11/11/22 1530 Straight-tip 16 Fr. <1 day              Airway  Duration                  Surgical Airway Shiley Cuffed -- days         Laryngectomy (Do Not Remove) 06/09/22 1414 Laryngectomy stoma 155 days         Airway - Non-Surgical 11/09/22 0856 Coil Wire Tube 3 days              Peripheral Intravenous Line  Duration                  Peripheral IV - Single Lumen 11/09/22 0726 18 G Left Hand 3 days         Peripheral IV - Single Lumen 11/09/22 2041 20 G Right Antecubital 2 days                    Physical Exam  Constitutional:       General: He is not in acute distress.  HENT:      Head:      Comments: Diffuse facial edema bilaterally, soft to palpation     Mouth/Throat:      Comments: Flap soft to palpation, sutures to FOM intact   Eyes:      Extraocular Movements: Extraocular movements intact.   Neck:      Comments: Trach in place to stoma   Skin graft to midline neck overlying flap, appropriate drainage  Staples c/d/I on lateral neck, penroses in place bilaterally   Doppler with good venous and arterial signals   Cardiovascular:      Rate and Rhythm: Normal rate and regular rhythm.   Pulmonary:      Effort: No respiratory distress.   Neurological:      Mental Status: He is alert.     Significant Labs:  CBC:   Recent Labs   Lab 11/12/22  0515   WBC 6.55   RBC 2.36*   HGB 7.6*   HCT 22.5*   *   MCV 95   MCH 32.2*   MCHC 33.8     CMP:   Recent Labs   Lab 11/12/22  0515   *   CALCIUM 8.0*   ALBUMIN 2.5*   PROT 5.4*      K 4.2   CO2 21*      BUN 23   CREATININE 0.8   ALKPHOS 50*   ALT 9*   AST 17   BILITOT 0.5       Significant Diagnostics:  None

## 2022-11-12 NOTE — PROGRESS NOTES
[facility]  Critical Care - Surgery  Progress Note    Patient Name: Cyrus Batres Jr.  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 He presents to the OR today with ENT for Pharyngectomy, glossectomy, and free flap creation. Pt being admitted directly to the SICU from the OR. Of note, per ENT pt has trach in place to avoid secretions going down into his lungs. Pt also has venous and arterial doppler wires placed within the surgical site for q1H monitoring. Also informed to expect extreme swelling in the post op period.      On arrival to the SICU, pt HDS and satting 100% via trach collar on RA. Pt received 4L of crystalloid during the case along with one unit of pRBCs. He is off of vasopressors on arrival as well. He has a left radial a-line in place for BP monitoring. He has a left leg drain from graft taken and right leg skin graft wound with surgical bandage in place. Pt also has G tube in place along with mahan for UOP monitoring.      Interval History/Significant Events:   Overnight events: NAEON    Vitals: VSS    Need to know: spoke with ENT team yesterday, they wanted to keep him in SICU to receive neurochecks. Will reach out today to ask if step down is appropriate.         Follow-up For: Procedure(s) (LRB):  TOTAL PHARYNGECTOMY  TOTAL GLOSSECTOMY (Bilateral)  CREATION, FREE FLAP, ALT (Left)  LARYNGOSCOPY, DIRECT  DISSECTION, NECK (Bilateral)  APPLICATION, GRAFT, SKIN, SPLIT-THICKNESS (Right)    Post-Operative Day: 3 Days Post-Op    Objective:     Vital Signs (Most Recent):  Temp:  98.6 °F (37 °C) (11/12/22 0715)  Pulse: 86 (11/12/22 0715)  Resp: 15 (11/12/22 0715)  BP: (!) 108/54 (11/12/22 0702)  SpO2: 99 % (11/12/22 0715)   Vital Signs (24h Range):  Temp:  [98.6 °F (37 °C)-100.5 °F (38.1 °C)] 98.6 °F (37 °C)  Pulse:  [] 86  Resp:  [11-27] 15  SpO2:  [97 %-100 %] 99 %  BP: ()/(50-79) 108/54     Weight: 74.2 kg (163 lb 9.3 oz)  Body mass index is 26.4 kg/m².      Intake/Output Summary (Last 24 hours) at 11/12/2022 0738  Last data filed at 11/12/2022 0702  Gross per 24 hour   Intake 3214.84 ml   Output 1660 ml   Net 1554.84 ml       Physical Exam  Neck:      Comments: Flap present and intact, sutures in place   Cardiovascular:      Rate and Rhythm: Normal rate and regular rhythm.      Pulses: Normal pulses.      Heart sounds: Normal heart sounds.   Pulmonary:      Comments: Oxygen through trach  Abdominal:      General: Abdomen is flat.      Palpations: Abdomen is soft.   Skin:     Comments: Surgical incision site over LLE CDI   Neurological:      General: No focal deficit present.      Mental Status: He is alert and oriented to person, place, and time.       Vents:  Oxygen Concentration (%): 21 (11/12/22 0702)    Lines/Drains/Airways       Central Venous Catheter Line  Duration             Port A Cath Single Lumen left subclavian -- days              Drain  Duration                  Gastrostomy/Enterostomy 06/09/22 1935 midline feeding 155 days         Closed/Suction Drain 11/09/22 1518 Left;Anterior Thigh Bulb 19 Fr. 2 days         Urethral Catheter 11/11/22 1530 Straight-tip 16 Fr. <1 day              Airway  Duration                  Surgical Airway Shiley Cuffed -- days         Laryngectomy (Do Not Remove) 06/09/22 1414 Laryngectomy stoma 155 days         Airway - Non-Surgical 11/09/22 0856 Coil Wire Tube 2 days              Peripheral Intravenous Line  Duration                  Peripheral IV - Single Lumen 11/09/22 0726 18 G Left Hand 3 days         Peripheral IV - Single  Lumen 11/09/22 2041 20 G Right Antecubital 2 days                    Significant Labs:    CBC/Anemia Profile:  Recent Labs   Lab 11/11/22  0602 11/12/22  0515   WBC 7.46  7.46 6.55   HGB 8.1*  8.1* 7.6*   HCT 23.4*  23.4* 22.5*   *  124* 149*   MCV 93  93 95   RDW 14.3  14.3 13.6        Chemistries:  Recent Labs   Lab 11/11/22 0602 11/11/22 1430 11/11/22 1949 11/12/22  0515     136 137 140 137   K 3.5  3.5  --  3.5 4.2     106  --  106 106   CO2 19*  19*  --  25 21*   BUN 21  21  --  21 23   CREATININE 0.8  0.8  --  0.8 0.8   CALCIUM 7.3*  7.3*  --  8.4* 8.0*   ALBUMIN 2.8*  2.8*  --  2.8* 2.5*   PROT 5.2*  5.2*  --  5.6* 5.4*   BILITOT 0.7  0.7  --  0.7 0.5   ALKPHOS 52*  52*  --  56 50*   ALT 8*  8*  --  10 9*   AST 17  17  --  17 17   MG  --   --  1.4* 2.0   PHOS  --   --  3.0 3.1         BMP:   Recent Labs   Lab 11/12/22  0515   *      K 4.2      CO2 21*   BUN 23   CREATININE 0.8   CALCIUM 8.0*   MG 2.0     CMP:   Recent Labs   Lab 11/11/22 0602 11/11/22 1430 11/11/22 1949 11/12/22  0515     136 137 140 137   K 3.5  3.5  --  3.5 4.2     106  --  106 106   CO2 19*  19*  --  25 21*   *  179*  --  147* 162*   BUN 21  21  --  21 23   CREATININE 0.8  0.8  --  0.8 0.8   CALCIUM 7.3*  7.3*  --  8.4* 8.0*   PROT 5.2*  5.2*  --  5.6* 5.4*   ALBUMIN 2.8*  2.8*  --  2.8* 2.5*   BILITOT 0.7  0.7  --  0.7 0.5   ALKPHOS 52*  52*  --  56 50*   AST 17  17  --  17 17   ALT 8*  8*  --  10 9*   ANIONGAP 11  11  --  9 10       Significant Imaging:  I have reviewed all pertinent imaging results/findings within the past 24 hours.    Assessment/Plan:     * Malignant neoplasm of base of tongue    Neuro/Psych:   -- Sedation: None   -- Pain: Adequately controlled on arrival to the SICU. Gabapentin. PRN Norco and morphine.   -- Trazodone QHS              Cards:   -- HDS  -- left radial a-line in place      Pulm:   -- Goal O2 sat > 90%  --  Wean as able  -- pt on trach collar       Renal:  -- Keep mahan for strict I/O  -- BUN/Cr 23/0.8  -- Daily CMP, Mag, Phos   -- post-op calcium      FEN / GI:   -- D5 1/2 NS 100cc/hr   -- Replace lytes as needed  -- Nutrition: NPO  -- G tube in place, ok to give meds through G tube for now but will remain NPO      ID:   -- Tm: afebrile; f/u WBC in post op labs    -- Abx Unasyn post op      Heme/Onc:   -- F/U H/H in post labs   -- Daily CBC      Endo:   -- Gluc goal 140-180  -- SSI      PPx:   Feeding: NPO  Analgesia/Sedation: gabapentin, PRN norco and morphine   Thromboembolic prevention: lovenox  HOB >30: yes  Stress Ulcer ppx: N/A  Glucose control: Critical care goal 140-180 g/dl, ISS    Lines/Drains/Airway: Left leg drain, G tube, mahan, left radial a-line, neck penrose drains bl      Dispo/Code Status/Palliative:   -- Admit to SICU / Full Code  -- Plan to step down after touching base with primary team  -- Discussed with staff Dr. Rios            Critical Care Daily Checklist:    A: Awake: RASS Goal/Actual Goal: RASS Goal: 0-->alert and calm  Actual: Sosa Agitation Sedation Scale (RASS): Alert and calm   B: Spontaneous Breathing Trial Performed?     C: SAT & SBT Coordinated?  NA                      D: Delirium: CAM-ICU Overall CAM-ICU: Negative   E: Early Mobility Performed? No   F: Feeding Goal: Goals: Meet % of kcal/protein needs by RD f/u date  Status: Nutrition Goal Status: new   Current Diet Order   Procedures    Diet NPO      AS: Analgesia/Sedation See above   T: Thromboembolic Prophylaxis See above   H: HOB > 300 Yes   U: Stress Ulcer Prophylaxis (if needed)    G: Glucose Control    B: Bowel Function     I: Indwelling Catheter (Lines & Mahan) Necessity    D: De-escalation of Antimicrobials/Pharmacotherapies     Plan for the day/ETD     Code Status:  Family/Goals of Care: Full Code         Critical secondary to Patient has a condition that poses threat to life and bodily function:  post-op     Critical care was time spent personally by me on the following activities: development of treatment plan with patient or surrogate and bedside caregivers, discussions with consultants, evaluation of patient's response to treatment, examination of patient, ordering and performing treatments and interventions, ordering and review of laboratory studies, ordering and review of radiographic studies, pulse oximetry, re-evaluation of patient's condition.  This critical care time did not overlap with that of any other provider or involve time for any procedures.     ISRA EAGLE MD  Critical Care - Surgery  [facility]

## 2022-11-12 NOTE — NURSING
Pt voided 200 ml clear yellow urine per urinal @ 1400 but still has 575 ml on bladder scan. Pt given another hour to void but unable to void with some fullness noted. MD notified and orders for mahan. 600 ml clear yellow urine immediately removed with mahan in place. WCTM

## 2022-11-12 NOTE — SUBJECTIVE & OBJECTIVE
Interval History/Significant Events:   Overnight events: NAEON    Vitals: VSS    Need to know: spoke with ENT team yesterday, they wanted to keep him in SICU to receive neurochecks. Will reach out today to ask if step down is appropriate.         Follow-up For: Procedure(s) (LRB):  TOTAL PHARYNGECTOMY  TOTAL GLOSSECTOMY (Bilateral)  CREATION, FREE FLAP, ALT (Left)  LARYNGOSCOPY, DIRECT  DISSECTION, NECK (Bilateral)  APPLICATION, GRAFT, SKIN, SPLIT-THICKNESS (Right)    Post-Operative Day: 3 Days Post-Op    Objective:     Vital Signs (Most Recent):  Temp: 98.6 °F (37 °C) (11/12/22 0715)  Pulse: 86 (11/12/22 0715)  Resp: 15 (11/12/22 0715)  BP: (!) 108/54 (11/12/22 0702)  SpO2: 99 % (11/12/22 0715)   Vital Signs (24h Range):  Temp:  [98.6 °F (37 °C)-100.5 °F (38.1 °C)] 98.6 °F (37 °C)  Pulse:  [] 86  Resp:  [11-27] 15  SpO2:  [97 %-100 %] 99 %  BP: ()/(50-79) 108/54     Weight: 74.2 kg (163 lb 9.3 oz)  Body mass index is 26.4 kg/m².      Intake/Output Summary (Last 24 hours) at 11/12/2022 0738  Last data filed at 11/12/2022 0702  Gross per 24 hour   Intake 3214.84 ml   Output 1660 ml   Net 1554.84 ml       Physical Exam  Neck:      Comments: Flap present and intact, sutures in place   Cardiovascular:      Rate and Rhythm: Normal rate and regular rhythm.      Pulses: Normal pulses.      Heart sounds: Normal heart sounds.   Pulmonary:      Comments: Oxygen through trach  Abdominal:      General: Abdomen is flat.      Palpations: Abdomen is soft.   Skin:     Comments: Surgical incision site over LLE CDI   Neurological:      General: No focal deficit present.      Mental Status: He is alert and oriented to person, place, and time.       Vents:  Oxygen Concentration (%): 21 (11/12/22 0702)    Lines/Drains/Airways       Central Venous Catheter Line  Duration             Port A Cath Single Lumen left subclavian -- days              Drain  Duration                  Gastrostomy/Enterostomy 06/09/22 1935 midline  feeding 155 days         Closed/Suction Drain 11/09/22 1518 Left;Anterior Thigh Bulb 19 Fr. 2 days         Urethral Catheter 11/11/22 1530 Straight-tip 16 Fr. <1 day              Airway  Duration                  Surgical Airway Shiley Cuffed -- days         Laryngectomy (Do Not Remove) 06/09/22 1414 Laryngectomy stoma 155 days         Airway - Non-Surgical 11/09/22 0856 Coil Wire Tube 2 days              Peripheral Intravenous Line  Duration                  Peripheral IV - Single Lumen 11/09/22 0726 18 G Left Hand 3 days         Peripheral IV - Single Lumen 11/09/22 2041 20 G Right Antecubital 2 days                    Significant Labs:    CBC/Anemia Profile:  Recent Labs   Lab 11/11/22 0602 11/12/22  0515   WBC 7.46  7.46 6.55   HGB 8.1*  8.1* 7.6*   HCT 23.4*  23.4* 22.5*   *  124* 149*   MCV 93  93 95   RDW 14.3  14.3 13.6        Chemistries:  Recent Labs   Lab 11/11/22 0602 11/11/22 1430 11/11/22 1949 11/12/22  0515     136 137 140 137   K 3.5  3.5  --  3.5 4.2     106  --  106 106   CO2 19*  19*  --  25 21*   BUN 21  21  --  21 23   CREATININE 0.8  0.8  --  0.8 0.8   CALCIUM 7.3*  7.3*  --  8.4* 8.0*   ALBUMIN 2.8*  2.8*  --  2.8* 2.5*   PROT 5.2*  5.2*  --  5.6* 5.4*   BILITOT 0.7  0.7  --  0.7 0.5   ALKPHOS 52*  52*  --  56 50*   ALT 8*  8*  --  10 9*   AST 17  17  --  17 17   MG  --   --  1.4* 2.0   PHOS  --   --  3.0 3.1         BMP:   Recent Labs   Lab 11/12/22  0515   *      K 4.2      CO2 21*   BUN 23   CREATININE 0.8   CALCIUM 8.0*   MG 2.0     CMP:   Recent Labs   Lab 11/11/22 0602 11/11/22  1430 11/11/22 1949 11/12/22  0515     136 137 140 137   K 3.5  3.5  --  3.5 4.2     106  --  106 106   CO2 19*  19*  --  25 21*   *  179*  --  147* 162*   BUN 21  21  --  21 23   CREATININE 0.8  0.8  --  0.8 0.8   CALCIUM 7.3*  7.3*  --  8.4* 8.0*   PROT 5.2*  5.2*  --  5.6* 5.4*   ALBUMIN 2.8*  2.8*  --  2.8* 2.5*    BILITOT 0.7  0.7  --  0.7 0.5   ALKPHOS 52*  52*  --  56 50*   AST 17  17  --  17 17   ALT 8*  8*  --  10 9*   ANIONGAP 11  11  --  9 10       Significant Imaging:  I have reviewed all pertinent imaging results/findings within the past 24 hours.

## 2022-11-13 LAB
ABO + RH BLD: NORMAL
ALBUMIN SERPL BCP-MCNC: 2.3 G/DL (ref 3.5–5.2)
ALP SERPL-CCNC: 48 U/L (ref 55–135)
ALT SERPL W/O P-5'-P-CCNC: 7 U/L (ref 10–44)
ANION GAP SERPL CALC-SCNC: 9 MMOL/L (ref 8–16)
AST SERPL-CCNC: 17 U/L (ref 10–40)
BASOPHILS # BLD AUTO: 0.01 K/UL (ref 0–0.2)
BASOPHILS NFR BLD: 0.2 % (ref 0–1.9)
BILIRUB SERPL-MCNC: 0.4 MG/DL (ref 0.1–1)
BLD GP AB SCN CELLS X3 SERPL QL: NORMAL
BLD PROD TYP BPU: NORMAL
BLD PROD TYP BPU: NORMAL
BLOOD UNIT EXPIRATION DATE: NORMAL
BLOOD UNIT EXPIRATION DATE: NORMAL
BLOOD UNIT TYPE CODE: 7300
BLOOD UNIT TYPE CODE: 7300
BLOOD UNIT TYPE: NORMAL
BLOOD UNIT TYPE: NORMAL
BUN SERPL-MCNC: 24 MG/DL (ref 8–23)
CA-I BLDV-SCNC: 0.99 MMOL/L (ref 1.06–1.42)
CA-I BLDV-SCNC: 1 MMOL/L (ref 1.06–1.42)
CA-I BLDV-SCNC: 1.06 MMOL/L (ref 1.06–1.42)
CA-I BLDV-SCNC: 1.14 MMOL/L (ref 1.06–1.42)
CALCIUM SERPL-MCNC: 12 MG/DL (ref 8.7–10.5)
CHLORIDE SERPL-SCNC: 105 MMOL/L (ref 95–110)
CO2 SERPL-SCNC: 20 MMOL/L (ref 23–29)
CODING SYSTEM: NORMAL
CODING SYSTEM: NORMAL
CREAT SERPL-MCNC: 0.8 MG/DL (ref 0.5–1.4)
DIFFERENTIAL METHOD: ABNORMAL
DISPENSE STATUS: NORMAL
DISPENSE STATUS: NORMAL
EOSINOPHIL # BLD AUTO: 0.3 K/UL (ref 0–0.5)
EOSINOPHIL NFR BLD: 4.4 % (ref 0–8)
ERYTHROCYTE [DISTWIDTH] IN BLOOD BY AUTOMATED COUNT: 14 % (ref 11.5–14.5)
EST. GFR  (NO RACE VARIABLE): >60 ML/MIN/1.73 M^2
GLUCOSE SERPL-MCNC: 159 MG/DL (ref 70–110)
HCT VFR BLD AUTO: 22.6 % (ref 40–54)
HGB BLD-MCNC: 7.6 G/DL (ref 14–18)
IMM GRANULOCYTES # BLD AUTO: 0.04 K/UL (ref 0–0.04)
IMM GRANULOCYTES NFR BLD AUTO: 0.7 % (ref 0–0.5)
LYMPHOCYTES # BLD AUTO: 0.5 K/UL (ref 1–4.8)
LYMPHOCYTES NFR BLD: 9.2 % (ref 18–48)
MAGNESIUM SERPL-MCNC: 1.5 MG/DL (ref 1.6–2.6)
MAGNESIUM SERPL-MCNC: 1.6 MG/DL (ref 1.6–2.6)
MCH RBC QN AUTO: 32.1 PG (ref 27–31)
MCHC RBC AUTO-ENTMCNC: 33.6 G/DL (ref 32–36)
MCV RBC AUTO: 95 FL (ref 82–98)
MONOCYTES # BLD AUTO: 0.4 K/UL (ref 0.3–1)
MONOCYTES NFR BLD: 7.8 % (ref 4–15)
NEUTROPHILS # BLD AUTO: 4.4 K/UL (ref 1.8–7.7)
NEUTROPHILS NFR BLD: 77.7 % (ref 38–73)
NRBC BLD-RTO: 0 /100 WBC
NUM UNITS TRANS PACKED RBC: NORMAL
NUM UNITS TRANS PACKED RBC: NORMAL
PHOSPHATE SERPL-MCNC: 3.8 MG/DL (ref 2.7–4.5)
PLATELET # BLD AUTO: 201 K/UL (ref 150–450)
PMV BLD AUTO: 11.5 FL (ref 9.2–12.9)
POTASSIUM SERPL-SCNC: 4.3 MMOL/L (ref 3.5–5.1)
PROT SERPL-MCNC: 5.3 G/DL (ref 6–8.4)
RBC # BLD AUTO: 2.37 M/UL (ref 4.6–6.2)
SODIUM SERPL-SCNC: 134 MMOL/L (ref 136–145)
WBC # BLD AUTO: 5.64 K/UL (ref 3.9–12.7)

## 2022-11-13 PROCEDURE — 85025 COMPLETE CBC W/AUTO DIFF WBC: CPT | Performed by: STUDENT IN AN ORGANIZED HEALTH CARE EDUCATION/TRAINING PROGRAM

## 2022-11-13 PROCEDURE — 63600175 PHARM REV CODE 636 W HCPCS

## 2022-11-13 PROCEDURE — 83735 ASSAY OF MAGNESIUM: CPT | Mod: 91 | Performed by: OTOLARYNGOLOGY

## 2022-11-13 PROCEDURE — 94761 N-INVAS EAR/PLS OXIMETRY MLT: CPT

## 2022-11-13 PROCEDURE — 82330 ASSAY OF CALCIUM: CPT | Mod: 91 | Performed by: STUDENT IN AN ORGANIZED HEALTH CARE EDUCATION/TRAINING PROGRAM

## 2022-11-13 PROCEDURE — 25000242 PHARM REV CODE 250 ALT 637 W/ HCPCS

## 2022-11-13 PROCEDURE — 99900035 HC TECH TIME PER 15 MIN (STAT)

## 2022-11-13 PROCEDURE — 27000221 HC OXYGEN, UP TO 24 HOURS

## 2022-11-13 PROCEDURE — 25000242 PHARM REV CODE 250 ALT 637 W/ HCPCS: Performed by: SURGERY

## 2022-11-13 PROCEDURE — 84100 ASSAY OF PHOSPHORUS: CPT | Performed by: OTOLARYNGOLOGY

## 2022-11-13 PROCEDURE — 25000003 PHARM REV CODE 250: Performed by: STUDENT IN AN ORGANIZED HEALTH CARE EDUCATION/TRAINING PROGRAM

## 2022-11-13 PROCEDURE — 63600175 PHARM REV CODE 636 W HCPCS: Performed by: STUDENT IN AN ORGANIZED HEALTH CARE EDUCATION/TRAINING PROGRAM

## 2022-11-13 PROCEDURE — 99233 SBSQ HOSP IP/OBS HIGH 50: CPT | Mod: GC,,, | Performed by: SURGERY

## 2022-11-13 PROCEDURE — 27200966 HC CLOSED SUCTION SYSTEM

## 2022-11-13 PROCEDURE — 82330 ASSAY OF CALCIUM: CPT | Performed by: STUDENT IN AN ORGANIZED HEALTH CARE EDUCATION/TRAINING PROGRAM

## 2022-11-13 PROCEDURE — 99233 PR SUBSEQUENT HOSPITAL CARE,LEVL III: ICD-10-PCS | Mod: GC,,, | Performed by: SURGERY

## 2022-11-13 PROCEDURE — 86850 RBC ANTIBODY SCREEN: CPT | Performed by: OTOLARYNGOLOGY

## 2022-11-13 PROCEDURE — 20000000 HC ICU ROOM

## 2022-11-13 PROCEDURE — 80053 COMPREHEN METABOLIC PANEL: CPT | Performed by: OTOLARYNGOLOGY

## 2022-11-13 PROCEDURE — 25000003 PHARM REV CODE 250

## 2022-11-13 RX ADMIN — ENOXAPARIN SODIUM 40 MG: 100 INJECTION SUBCUTANEOUS at 04:11

## 2022-11-13 RX ADMIN — MAGNESIUM SULFATE 2 G: 2 INJECTION INTRAVENOUS at 06:11

## 2022-11-13 RX ADMIN — SENNOSIDES AND DOCUSATE SODIUM 1 TABLET: 50; 8.6 TABLET ORAL at 10:11

## 2022-11-13 RX ADMIN — CALCITRIOL CAPSULES 0.25 MCG 0.25 MCG: 0.25 CAPSULE ORAL at 08:11

## 2022-11-13 RX ADMIN — MAGNESIUM SULFATE 2 G: 2 INJECTION INTRAVENOUS at 04:11

## 2022-11-13 RX ADMIN — LEVOTHYROXINE SODIUM 100 MCG: 100 TABLET ORAL at 06:11

## 2022-11-13 RX ADMIN — GABAPENTIN 300 MG: 300 CAPSULE ORAL at 03:11

## 2022-11-13 RX ADMIN — OXYCODONE HYDROCHLORIDE 10 MG: 5 SOLUTION ORAL at 12:11

## 2022-11-13 RX ADMIN — GABAPENTIN 300 MG: 300 CAPSULE ORAL at 08:11

## 2022-11-13 RX ADMIN — AMPICILLIN SODIUM AND SULBACTAM SODIUM 3 G: 2; 1 INJECTION, POWDER, FOR SOLUTION INTRAMUSCULAR; INTRAVENOUS at 03:11

## 2022-11-13 RX ADMIN — MUPIROCIN: 20 OINTMENT TOPICAL at 08:11

## 2022-11-13 RX ADMIN — SENNOSIDES AND DOCUSATE SODIUM 1 TABLET: 50; 8.6 TABLET ORAL at 08:11

## 2022-11-13 RX ADMIN — TRAZODONE HYDROCHLORIDE 50 MG: 50 TABLET ORAL at 10:11

## 2022-11-13 RX ADMIN — AMPICILLIN SODIUM AND SULBACTAM SODIUM 3 G: 2; 1 INJECTION, POWDER, FOR SOLUTION INTRAMUSCULAR; INTRAVENOUS at 04:11

## 2022-11-13 RX ADMIN — CALCIUM CARBONATE 1000 MG: 1250 SUSPENSION ORAL at 04:11

## 2022-11-13 RX ADMIN — CALCIUM CARBONATE 1000 MG: 1250 SUSPENSION ORAL at 12:11

## 2022-11-13 RX ADMIN — OXYCODONE HYDROCHLORIDE 10 MG: 5 SOLUTION ORAL at 08:11

## 2022-11-13 RX ADMIN — CALCIUM GLUCONATE 2 G: 20 INJECTION, SOLUTION INTRAVENOUS at 01:11

## 2022-11-13 RX ADMIN — OXYCODONE HYDROCHLORIDE 10 MG: 5 SOLUTION ORAL at 04:11

## 2022-11-13 RX ADMIN — OXYCODONE HYDROCHLORIDE 5 MG: 5 SOLUTION ORAL at 04:11

## 2022-11-13 RX ADMIN — AMPICILLIN SODIUM AND SULBACTAM SODIUM 3 G: 2; 1 INJECTION, POWDER, FOR SOLUTION INTRAMUSCULAR; INTRAVENOUS at 09:11

## 2022-11-13 RX ADMIN — CALCIUM CARBONATE 1000 MG: 1250 SUSPENSION ORAL at 07:11

## 2022-11-13 RX ADMIN — CALCIUM GLUCONATE 1 G: 20 INJECTION, SOLUTION INTRAVENOUS at 05:11

## 2022-11-13 RX ADMIN — GABAPENTIN 300 MG: 300 CAPSULE ORAL at 10:11

## 2022-11-13 NOTE — PLAN OF CARE
"      SICU PLAN OF CARE NOTE    Dx: Malignant neoplasm of base of tongue    Shift Events: No acute events all shift. Decreased facial swelling noted. Strong doppler FLAP pulses, Q2hr. Increased tan, thick tracheal secretions, humidified air increased to 10L per TC. Oxy elixir around the clock for throat soreness/pain as ordered. Mild serous/SS drainage from bilateral neck penrose drains, scant drainage to LLE GLENN drain. Plan to stepdown tomorrow per MD. Lytes replaced as ordered.    Goals of Care: MAP >65    Neuro: AAO x4, Follows Commands, and Moves All Extremities    Vital Signs: /62 (BP Location: Left arm, Patient Position: Lying)   Pulse (!) 117   Temp 99.5 °F (37.5 °C) (Axillary)   Resp 19   Ht 5' 6" (1.676 m)   Wt 73.3 kg (161 lb 9.6 oz)   SpO2 97%   BMI 26.08 kg/m²     Respiratory: Room Air (10L/21% humidified medical air per TC)    Diet: Tube Feeds    Gtts: none    Urine Output: Urinary Catheter 1010 cc/shift    Drains: GLENN Drain, total output 5 cc / shift    Flap Checks Q2hr    Cardiac: NSR- Sinus tachy  bpm     Labs/Accuchecks: Q8hr Ionized Ca+, daily labs.    Skin: No new skin breakdown noted. Foams in place. Heel boots in use. Waffle inflated and bed functioning properly.       "

## 2022-11-13 NOTE — ASSESSMENT & PLAN NOTE
  Neuro/Psych:   -- Sedation: None   -- Pain: Adequately controlled on arrival to the SICU. Gabapentin. PRN Norco and morphine.   -- Trazodone QHS  -- Patient still in SICU per primary team - due to significant facial edema and concern for development of cerebral edema             Cards:   -- HDS  -- left radial a-line in place      Pulm:   -- Goal O2 sat > 90%  -- Wean as able  -- pt on trach collar       Renal:  -- Keep mahan for strict I/O  -- BUN/Cr stable  -- Daily CMP, Mag, Phos   -- post-op calcium      FEN / GI:   -- Tube feedings at 20cc/hr   -- Replace lytes as needed  -- Nutrition: NPO  -- G tube in place, ok to give meds through G tube for now but will remain NPO      ID:   -- Tm: afebrile; WBC 5.6  -- Abx Unasyn post op      Heme/Onc:   -- H/H 7.6/22.6  -- Daily CBC      Endo:   -- Gluc goal 140-180  -- SSI      PPx:   Feeding: NPO, tube feedings at 20cc/hr  Analgesia/Sedation: gabapentin, PRN norco and morphine   Thromboembolic prevention: lovenox  HOB >30: yes  Stress Ulcer ppx: N/A  Glucose control: Critical care goal 140-180 g/dl, ISS    Lines/Drains/Airway: Left leg drain, G tube, mahan, left radial a-line, neck penrose drains bl      Dispo/Code Status/Palliative:   -- SICU / Full Code  -- Discussed with staff Dr. Rios

## 2022-11-13 NOTE — ASSESSMENT & PLAN NOTE
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 Now s/p total glossectomy, pharyngectomy, and ALT free flap and STSG reconstruction on 11/9.        ENT/HNS:  - Q2H flap checks given significant facial edema and concern for development of cerebral edema  - No neck ties   - Keep tracheostomy in place w/ cuff inflated  - SLP evaluation  - Bacitracin ointemnt to incision sites   -Sign above bed + outside door stating patient is "neck breather" and "can not be intubated by mouth"  -Clean laryngectomy stoma as needed, or daily  -Fresh wound care, clean incision with peroxide/qtip followed by bacitracin BID  -Humidified O2 via trach collar     Neuro:   - Q1H neuro checks   - Monitor urinary output   - Pain: Multimodality PRN regimen initiated     Cardiac:  - HDS  - Page ENT before starting vasopressors  - H/O of paroxysmal a fib, will CTM      Pulmonary:   - PRN breathing treatments  - Humidified air per trach collar     Renal:   - monitor Is and Os  - Cr stable  - Juarez replaced 11/11     Infectious Disease:  - Daily CBC  - Continue unasyn     Hematology/Oncology:  - H/H stable  - Lovenox DVT prophylaxis      FEN/GI  - Postoperative hypoparathyroidism, iCal WNL    - Tums TID, calcitriol    - CTM    - Replace electrolytes as needed to keep K>4, Mg>2  - Bowel reg  - Protonix for GI prophylaxis  - Nausea control ondansetron, phenergan  - Continue TFs   - Continue maintenance IVF      MSK  - Out of bed as tolerated  - Drain removed  - Island dressing removed, bacitracin ointment to incision site      Dispo:  -Continue ICU care through weekend given continued worsening of edema and need for neuro checks    "

## 2022-11-13 NOTE — PLAN OF CARE
NAEON.      Afebrile. VSS. NSR 80-90s. MAP > 65 without pressors. Sats > 97% on Trach Collar 5 L / 21%.      Neuro: oriented x 4, PERRLA, follows commands, moves all extremities, uses written communication board.      Flap check q2h. See flowsheets.      Juarez in place with adequate UOP.      Tube feeds @ goal-- 45 cc/hr.      L Leg GLENN drain with minimal serosanguineous output.      Skin: weight shift assistance provided throughout shift. Turned with wedge. No new breakdown noted. SCDs and heel boots on. Complete bath given with new gown and sheets.      Call light in reach. Bed in low position and wheels locked. Spouse at bedside.      All labs reviewed and electrolytes replaced prn.

## 2022-11-13 NOTE — PROGRESS NOTES
[facility]  Otorhinolaryngology-Head & Neck Surgery  Progress Note    Subjective:     Post-Op Info:  Procedure(s) (LRB):  TOTAL PHARYNGECTOMY  TOTAL GLOSSECTOMY (Bilateral)  CREATION, FREE FLAP, ALT (Left)  LARYNGOSCOPY, DIRECT  DISSECTION, NECK (Bilateral)  APPLICATION, GRAFT, SKIN, SPLIT-THICKNESS (Right)   4 Days Post-Op  Hospital Day: 5     Interval History: No issues overnight. Swelling improved slightly. Patient denies any headache or vision changes. Tolerating tube feeds at goal.     Medications:  Continuous Infusions:  Scheduled Meds:   ampicillin-sulbactim (UNASYN) IVPB  3 g Intravenous Q6H    calcitRIOL  0.25 mcg Per G Tube Daily    calcium carbonate  1,000 mg Per G Tube TID WM    enoxaparin  40 mg Subcutaneous Daily    gabapentin  300 mg Per G Tube TID    levothyroxine  100 mcg Per G Tube Before breakfast    mupirocin   Nasal BID    senna-docusate 8.6-50 mg  1 tablet Per G Tube BID    traZODone  50 mg Per G Tube QHS     PRN Meds:sodium chloride 0.9%, sodium chloride 0.9%, calcium gluconate IVPB, calcium gluconate IVPB, calcium gluconate IVPB, dextrose 10%, dextrose 10%, glucagon (human recombinant), insulin aspart U-100, magnesium sulfate IVPB, magnesium sulfate IVPB, morphine, naloxone, oxyCODONE, oxyCODONE, potassium chloride **AND** potassium chloride **AND** potassium chloride, prochlorperazine, sodium chloride 0.9%, sodium chloride 0.9%, sodium phosphate IVPB, sodium phosphate IVPB, sodium phosphate IVPB     Review of patient's allergies indicates:   Allergen Reactions    Pollen extracts     Lovastatin Rash     Not confirmed but pt skeptical     Objective:     Vital Signs (24h Range):  Temp:  [98.1 °F (36.7 °C)-99.2 °F (37.3 °C)] 98.1 °F (36.7 °C)  Pulse:  [] 93  Resp:  [10-23] 13  SpO2:  [94 %-100 %] 94 %  BP: ()/(51-68) 111/58     Date 11/13/22 0700 - 11/14/22 0659   Shift 8169-5162 9657-4597 9723-6960 24 Hour Total   INTAKE   I.V.(mL/kg) 55.8(0.8)    55.8(0.8)   NG/   330   IV Piggyback 90.1   90.1   Shift Total(mL/kg) 475.9(6.5)   475.9(6.5)   OUTPUT   Urine(mL/kg/hr) 365   365   Shift Total(mL/kg) 365(5)   365(5)   Weight (kg) 73.3 73.3 73.3 73.3     Lines/Drains/Airways       Central Venous Catheter Line  Duration             Port A Cath Single Lumen left subclavian -- days              Drain  Duration                  Gastrostomy/Enterostomy 06/09/22 1935 midline feeding 156 days         Closed/Suction Drain 11/09/22 1518 Left;Anterior Thigh Bulb 19 Fr. 3 days         Urethral Catheter 11/11/22 1530 Straight-tip 16 Fr. 1 day              Airway  Duration                  Surgical Airway Shiley Cuffed -- days         Laryngectomy (Do Not Remove) 06/09/22 1414 Laryngectomy stoma 156 days         Airway - Non-Surgical 11/09/22 0856 Coil Wire Tube 4 days              Peripheral Intravenous Line  Duration                  Peripheral IV - Single Lumen 11/09/22 2041 20 G Right Antecubital 3 days         Peripheral IV - Single Lumen 11/13/22 0448 20 G Distal;Left;Posterior Wrist <1 day         Peripheral IV - Single Lumen 11/13/22 0937 20 G Anterior;Right Forearm <1 day                    Physical Exam  Constitutional:       General: He is not in acute distress.  HENT:      Head:      Comments: Diffuse facial edema bilaterally, soft to palpation     Mouth/Throat:      Comments: Flap soft to palpation, sutures to FOM intact   Eyes:      Extraocular Movements: Extraocular movements intact.   Neck:      Comments: Trach in place to stoma   Skin graft to midline neck overlying flap, appropriate drainage  Staples c/d/I on lateral neck, penroses in place bilaterally   Doppler with good venous and arterial signals   Cardiovascular:      Rate and Rhythm: Normal rate and regular rhythm.   Pulmonary:      Effort: No respiratory distress.   Neurological:      Mental Status: He is alert.     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 Now s/p total glossectomy, pharyngectomy, and ALT free flap and STSG reconstruction on 11/9.        ENT/HNS:  - Q2H flap checks given significant facial edema and concern for development of cerebral edema  - No neck ties   - Keep tracheostomy in place w/ cuff inflated  - SLP evaluation  - Bacitracin ointemnt to incision sites   -Sign above bed + outside door stating patient is "neck breather" and "can not be intubated by mouth"  -Clean laryngectomy stoma as needed, or daily  -Fresh wound care, clean incision with peroxide/qtip followed by bacitracin BID  -Humidified O2 via trach collar     Neuro:   - Q1H neuro checks   - Monitor urinary output   - Pain: Multimodality PRN regimen initiated     Cardiac:  - HDS  - Page ENT before starting vasopressors  - H/O of paroxysmal a fib, will CTM      Pulmonary:   - PRN breathing treatments  - Humidified air per trach collar     Renal:   - monitor Is and Os  - Cr stable  - Juarez replaced 11/11     Infectious Disease:  - Daily CBC  - Continue unasyn     Hematology/Oncology:  - H/H stable  - Lovenox DVT prophylaxis      FEN/GI  - Postoperative hypoparathyroidism, iCal WNL    - Tums TID, calcitriol    - CTM    - Replace electrolytes as needed to keep K>4, Mg>2  - Bowel reg  - Protonix for GI prophylaxis  - Nausea control ondansetron, phenergan  - Continue TFs   - Continue maintenance IVF      MSK  - Out of bed as tolerated  - Drain removed  - Island dressing removed, bacitracin ointment to incision site "      Dispo:  -Continue ICU care through weekend given continued worsening of edema and need for neuro checks          Henna Mckoy MD  Otorhinolaryngology-Head & Neck Surgery  [facility] - Surgical Intensive Care

## 2022-11-13 NOTE — SUBJECTIVE & OBJECTIVE
Interval History: No issues overnight. Swelling improved slightly. Patient denies any headache or vision changes. Tolerating tube feeds at goal.     Medications:  Continuous Infusions:  Scheduled Meds:   ampicillin-sulbactim (UNASYN) IVPB  3 g Intravenous Q6H    calcitRIOL  0.25 mcg Per G Tube Daily    calcium carbonate  1,000 mg Per G Tube TID WM    enoxaparin  40 mg Subcutaneous Daily    gabapentin  300 mg Per G Tube TID    levothyroxine  100 mcg Per G Tube Before breakfast    mupirocin   Nasal BID    senna-docusate 8.6-50 mg  1 tablet Per G Tube BID    traZODone  50 mg Per G Tube QHS     PRN Meds:sodium chloride 0.9%, sodium chloride 0.9%, calcium gluconate IVPB, calcium gluconate IVPB, calcium gluconate IVPB, dextrose 10%, dextrose 10%, glucagon (human recombinant), insulin aspart U-100, magnesium sulfate IVPB, magnesium sulfate IVPB, morphine, naloxone, oxyCODONE, oxyCODONE, potassium chloride **AND** potassium chloride **AND** potassium chloride, prochlorperazine, sodium chloride 0.9%, sodium chloride 0.9%, sodium phosphate IVPB, sodium phosphate IVPB, sodium phosphate IVPB     Review of patient's allergies indicates:   Allergen Reactions    Pollen extracts     Lovastatin Rash     Not confirmed but pt skeptical     Objective:     Vital Signs (24h Range):  Temp:  [98.1 °F (36.7 °C)-99.2 °F (37.3 °C)] 98.1 °F (36.7 °C)  Pulse:  [] 93  Resp:  [10-23] 13  SpO2:  [94 %-100 %] 94 %  BP: ()/(51-68) 111/58     Date 11/13/22 0700 - 11/14/22 0659   Shift 9318-7692 4177-9432 2959-9099 24 Hour Total   INTAKE   I.V.(mL/kg) 55.8(0.8)   55.8(0.8)   NG/   330   IV Piggyback 90.1   90.1   Shift Total(mL/kg) 475.9(6.5)   475.9(6.5)   OUTPUT   Urine(mL/kg/hr) 365   365   Shift Total(mL/kg) 365(5)   365(5)   Weight (kg) 73.3 73.3 73.3 73.3     Lines/Drains/Airways       Central Venous Catheter Line  Duration             Port A Cath Single Lumen left subclavian -- days              Drain  Duration                   Gastrostomy/Enterostomy 06/09/22 1935 midline feeding 156 days         Closed/Suction Drain 11/09/22 1518 Left;Anterior Thigh Bulb 19 Fr. 3 days         Urethral Catheter 11/11/22 1530 Straight-tip 16 Fr. 1 day              Airway  Duration                  Surgical Airway Shiley Cuffed -- days         Laryngectomy (Do Not Remove) 06/09/22 1414 Laryngectomy stoma 156 days         Airway - Non-Surgical 11/09/22 0856 Coil Wire Tube 4 days              Peripheral Intravenous Line  Duration                  Peripheral IV - Single Lumen 11/09/22 2041 20 G Right Antecubital 3 days         Peripheral IV - Single Lumen 11/13/22 0448 20 G Distal;Left;Posterior Wrist <1 day         Peripheral IV - Single Lumen 11/13/22 0937 20 G Anterior;Right Forearm <1 day                    Physical Exam  Constitutional:       General: He is not in acute distress.  HENT:      Head:      Comments: Diffuse facial edema bilaterally, soft to palpation     Mouth/Throat:      Comments: Flap soft to palpation, sutures to FOM intact   Eyes:      Extraocular Movements: Extraocular movements intact.   Neck:      Comments: Trach in place to stoma   Skin graft to midline neck overlying flap, appropriate drainage  Staples c/d/I on lateral neck, penroses in place bilaterally   Doppler with good venous and arterial signals   Cardiovascular:      Rate and Rhythm: Normal rate and regular rhythm.   Pulmonary:      Effort: No respiratory distress.   Neurological:      Mental Status: He is alert.     Significant Labs:  CBC:   Recent Labs   Lab 11/13/22  0412   WBC 5.64   RBC 2.37*   HGB 7.6*   HCT 22.6*      MCV 95   MCH 32.1*   MCHC 33.6     CMP:   Recent Labs   Lab 11/13/22  0538   *   CALCIUM 12.0*   ALBUMIN 2.3*   PROT 5.3*   *   K 4.3   CO2 20*      BUN 24*   CREATININE 0.8   ALKPHOS 48*   ALT 7*   AST 17   BILITOT 0.4       Significant Diagnostics:  None

## 2022-11-13 NOTE — SUBJECTIVE & OBJECTIVE
Interval History/Significant Events:     Follow-up For: Procedure(s) (LRB):  TOTAL PHARYNGECTOMY  TOTAL GLOSSECTOMY (Bilateral)  CREATION, FREE FLAP, ALT (Left)  LARYNGOSCOPY, DIRECT  DISSECTION, NECK (Bilateral)  APPLICATION, GRAFT, SKIN, SPLIT-THICKNESS (Right)    Post-Operative Day: 4 Days Post-Op    Objective:     Vital Signs (Most Recent):  Temp: 98.4 °F (36.9 °C) (11/13/22 0400)  Pulse: 86 (11/13/22 0550)  Resp: 15 (11/13/22 0550)  BP: (!) 117/58 (11/13/22 0500)  SpO2: 99 % (11/13/22 0550)   Vital Signs (24h Range):  Temp:  [98.4 °F (36.9 °C)-99.2 °F (37.3 °C)] 98.4 °F (36.9 °C)  Pulse:  [] 86  Resp:  [10-23] 15  SpO2:  [95 %-100 %] 99 %  BP: ()/(51-68) 117/58     Weight: 74.2 kg (163 lb 9.3 oz)  Body mass index is 26.4 kg/m².      Intake/Output Summary (Last 24 hours) at 11/13/2022 0608  Last data filed at 11/13/2022 0500  Gross per 24 hour   Intake 2664.98 ml   Output 1940 ml   Net 724.98 ml       Physical Exam  Constitutional:       General: He is not in acute distress.  HENT:      Head:      Comments: Diffuse facial edema bilaterally, soft to palpation     Mouth/Throat:      Comments: Flap soft to palpation, sutures to FOM intact   Eyes:      Extraocular Movements: Extraocular movements intact.   Neck:      Comments: Trach in place to stoma   Skin graft to midline neck overlying flap, appropriate drainage  Staples c/d/I on lateral neck, penroses in place bilaterally   Doppler with good venous and arterial signals   Cardiovascular:      Rate and Rhythm: Normal rate and regular rhythm.   Pulmonary:      Effort: No respiratory distress.   Neurological:      Mental Status: He is alert.     Vents:  Oxygen Concentration (%): 21 (11/13/22 0550)    Lines/Drains/Airways       Central Venous Catheter Line  Duration             Port A Cath Single Lumen left subclavian -- days              Drain  Duration                  Gastrostomy/Enterostomy 06/09/22 1935 midline feeding 156 days         Closed/Suction  Drain 11/09/22 1518 Left;Anterior Thigh Bulb 19 Fr. 3 days         Urethral Catheter 11/11/22 1530 Straight-tip 16 Fr. 1 day              Airway  Duration                  Surgical Airway Shiley Cuffed -- days         Laryngectomy (Do Not Remove) 06/09/22 1414 Laryngectomy stoma 156 days         Airway - Non-Surgical 11/09/22 0856 Coil Wire Tube 3 days              Peripheral Intravenous Line  Duration                  Peripheral IV - Single Lumen 11/09/22 2041 20 G Right Antecubital 3 days         Peripheral IV - Single Lumen 11/13/22 0448 20 G Distal;Left;Posterior Wrist <1 day                    Significant Labs:    CBC/Anemia Profile:  Recent Labs   Lab 11/12/22  0515 11/13/22  0412   WBC 6.55 5.64   HGB 7.6* 7.6*   HCT 22.5* 22.6*   * 201   MCV 95 95   RDW 13.6 14.0        Chemistries:  Recent Labs   Lab 11/11/22  1949 11/12/22  0515 11/12/22  1507    137 137   K 3.5 4.2  --     106  --    CO2 25 21*  --    BUN 21 23  --    CREATININE 0.8 0.8  --    CALCIUM 8.4* 8.0*  --    ALBUMIN 2.8* 2.5*  --    PROT 5.6* 5.4*  --    BILITOT 0.7 0.5  --    ALKPHOS 56 50*  --    ALT 10 9*  --    AST 17 17  --    MG 1.4* 2.0  --    PHOS 3.0 3.1  --        All pertinent labs within the past 24 hours have been reviewed.    Significant Imaging:  I have reviewed all pertinent imaging results/findings within the past 24 hours.

## 2022-11-13 NOTE — PROGRESS NOTES
[facility]  Critical Care - Surgery  Progress Note    Patient Name: Cyrus Batres Jr.  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 He presents to the OR today with ENT for Pharyngectomy, glossectomy, and free flap creation. Pt being admitted directly to the SICU from the OR. Of note, per ENT pt has trach in place to avoid secretions going down into his lungs. Pt also has venous and arterial doppler wires placed within the surgical site for q1H monitoring. Also informed to expect extreme swelling in the post op period.      On arrival to the SICU, pt HDS and satting 100% via trach collar on RA. Pt received 4L of crystalloid during the case along with one unit of pRBCs. He is off of vasopressors on arrival as well. He has a left radial a-line in place for BP monitoring. He has a left leg drain from graft taken and right leg skin graft wound with surgical bandage in place. Pt also has G tube in place along with mahan for UOP monitoring.      Interval History/Significant Events:     Follow-up For: Procedure(s) (LRB):  TOTAL PHARYNGECTOMY  TOTAL GLOSSECTOMY (Bilateral)  CREATION, FREE FLAP, ALT (Left)  LARYNGOSCOPY, DIRECT  DISSECTION, NECK (Bilateral)  APPLICATION, GRAFT, SKIN, SPLIT-THICKNESS (Right)    Post-Operative Day: 4 Days Post-Op    Objective:     Vital Signs (Most Recent):  Temp: 98.4 °F (36.9 °C) (11/13/22 0400)  Pulse: 86 (11/13/22 0550)  Resp: 15 (11/13/22 0550)  BP: (!) 117/58 (11/13/22 0500)  SpO2: 99 % (11/13/22 0550)   Vital Signs (24h Range):  Temp:  [98.4 °F (36.9 °C)-99.2 °F  (37.3 °C)] 98.4 °F (36.9 °C)  Pulse:  [] 86  Resp:  [10-23] 15  SpO2:  [95 %-100 %] 99 %  BP: ()/(51-68) 117/58     Weight: 74.2 kg (163 lb 9.3 oz)  Body mass index is 26.4 kg/m².      Intake/Output Summary (Last 24 hours) at 11/13/2022 0608  Last data filed at 11/13/2022 0500  Gross per 24 hour   Intake 2664.98 ml   Output 1940 ml   Net 724.98 ml       Physical Exam  Constitutional:       General: He is not in acute distress.  HENT:      Head:      Comments: Diffuse facial edema bilaterally, soft to palpation     Mouth/Throat:      Comments: Flap soft to palpation, sutures to FOM intact   Eyes:      Extraocular Movements: Extraocular movements intact.   Neck:      Comments: Trach in place to stoma   Skin graft to midline neck overlying flap, appropriate drainage  Staples c/d/I on lateral neck, penroses in place bilaterally   Doppler with good venous and arterial signals   Cardiovascular:      Rate and Rhythm: Normal rate and regular rhythm.   Pulmonary:      Effort: No respiratory distress.   Neurological:      Mental Status: He is alert.     Vents:  Oxygen Concentration (%): 21 (11/13/22 0550)    Lines/Drains/Airways       Central Venous Catheter Line  Duration             Port A Cath Single Lumen left subclavian -- days              Drain  Duration                  Gastrostomy/Enterostomy 06/09/22 1935 midline feeding 156 days         Closed/Suction Drain 11/09/22 1518 Left;Anterior Thigh Bulb 19 Fr. 3 days         Urethral Catheter 11/11/22 1530 Straight-tip 16 Fr. 1 day              Airway  Duration                  Surgical Airway Shiley Cuffed -- days         Laryngectomy (Do Not Remove) 06/09/22 1414 Laryngectomy stoma 156 days         Airway - Non-Surgical 11/09/22 0856 Coil Wire Tube 3 days              Peripheral Intravenous Line  Duration                  Peripheral IV - Single Lumen 11/09/22 2041 20 G Right Antecubital 3 days         Peripheral IV - Single Lumen 11/13/22 0448 20 G  Distal;Left;Posterior Wrist <1 day                    Significant Labs:    CBC/Anemia Profile:  Recent Labs   Lab 11/12/22  0515 11/13/22  0412   WBC 6.55 5.64   HGB 7.6* 7.6*   HCT 22.5* 22.6*   * 201   MCV 95 95   RDW 13.6 14.0        Chemistries:  Recent Labs   Lab 11/11/22  1949 11/12/22  0515 11/12/22  1507    137 137   K 3.5 4.2  --     106  --    CO2 25 21*  --    BUN 21 23  --    CREATININE 0.8 0.8  --    CALCIUM 8.4* 8.0*  --    ALBUMIN 2.8* 2.5*  --    PROT 5.6* 5.4*  --    BILITOT 0.7 0.5  --    ALKPHOS 56 50*  --    ALT 10 9*  --    AST 17 17  --    MG 1.4* 2.0  --    PHOS 3.0 3.1  --        All pertinent labs within the past 24 hours have been reviewed.    Significant Imaging:  I have reviewed all pertinent imaging results/findings within the past 24 hours.    Assessment/Plan:     * Malignant neoplasm of base of tongue    Neuro/Psych:   -- Sedation: None   -- Pain: Adequately controlled on arrival to the SICU. Gabapentin. PRN Norco and morphine.   -- Trazodone QHS  -- Patient still in SICU per primary team - due to significant facial edema and concern for development of cerebral edema             Cards:   -- HDS  -- left radial a-line in place      Pulm:   -- Goal O2 sat > 90%  -- Wean as able  -- pt on trach collar       Renal:  -- Keep mahan for strict I/O  -- BUN/Cr stable  -- Daily CMP, Mag, Phos   -- post-op calcium      FEN / GI:   -- Tube feedings at 20cc/hr   -- Replace lytes as needed  -- Nutrition: NPO  -- G tube in place, ok to give meds through G tube for now but will remain NPO      ID:   -- Tm: afebrile; WBC 5.6  -- Abx Unasyn post op      Heme/Onc:   -- H/H 7.6/22.6  -- Daily CBC      Endo:   -- Gluc goal 140-180  -- SSI      PPx:   Feeding: NPO, tube feedings at 20cc/hr  Analgesia/Sedation: gabapentin, PRN norco and morphine   Thromboembolic prevention: lovenox  HOB >30: yes  Stress Ulcer ppx: N/A  Glucose control: Critical care goal 140-180 g/dl,  ISS    Lines/Drains/Airway: Left leg drain, G tube, mahan, left radial a-line, neck penrose drains bl      Dispo/Code Status/Palliative:   -- SICU / Full Code  -- Discussed with staff Dr. Blanca Aquino MD  Critical Care - Surgery  [facility]

## 2022-11-13 NOTE — PLAN OF CARE
"      SICU PLAN OF CARE NOTE    Dx: Malignant neoplasm of base of tongue    Shift Events: Wilda hood remains in stoma d/t secretions and facial swelling per MD order. Strong doppler FLAP pulses with color unchanged. Some increased swelling to right side of face like previous day.    Goals of Care: MAP >65    Neuro: AAO x4, Follows Commands, and Moves All Extremities    Vital Signs: BP (!) 113/55 (BP Location: Left arm, Patient Position: Lying)   Pulse 92   Temp 99.2 °F (37.3 °C) (Axillary)   Resp 11   Ht 5' 6" (1.676 m)   Wt 74.2 kg (163 lb 9.3 oz)   SpO2 95%   BMI 26.40 kg/m²     Respiratory: Room Air (5L humidified medical air per TC)    Diet: Tube Feeds    Gtts: none    Urine Output: Urinary Catheter 1050 cc/shift    Drains: GLENN Drain, total output 10 cc / shift    Flap Checks Q1hr- strong, pale     Labs/Accuchecks: Q8hr Ionized Ca+, Daily labs.    Skin: No new skin breakdown noted. See assessment and notes for details and orders.       "

## 2022-11-14 LAB
ALBUMIN SERPL BCP-MCNC: 2.2 G/DL (ref 3.5–5.2)
ALP SERPL-CCNC: 49 U/L (ref 55–135)
ALT SERPL W/O P-5'-P-CCNC: 8 U/L (ref 10–44)
ANION GAP SERPL CALC-SCNC: 8 MMOL/L (ref 8–16)
AST SERPL-CCNC: 12 U/L (ref 10–40)
BASOPHILS # BLD AUTO: 0.01 K/UL (ref 0–0.2)
BASOPHILS NFR BLD: 0.2 % (ref 0–1.9)
BILIRUB SERPL-MCNC: 0.5 MG/DL (ref 0.1–1)
BUN SERPL-MCNC: 24 MG/DL (ref 8–23)
CA-I BLDV-SCNC: 1.13 MMOL/L (ref 1.06–1.42)
CA-I BLDV-SCNC: 1.15 MMOL/L (ref 1.06–1.42)
CALCIUM SERPL-MCNC: 8.2 MG/DL (ref 8.7–10.5)
CHLORIDE SERPL-SCNC: 102 MMOL/L (ref 95–110)
CO2 SERPL-SCNC: 23 MMOL/L (ref 23–29)
CREAT SERPL-MCNC: 0.7 MG/DL (ref 0.5–1.4)
DIFFERENTIAL METHOD: ABNORMAL
EOSINOPHIL # BLD AUTO: 0.2 K/UL (ref 0–0.5)
EOSINOPHIL NFR BLD: 2.9 % (ref 0–8)
ERYTHROCYTE [DISTWIDTH] IN BLOOD BY AUTOMATED COUNT: 13.1 % (ref 11.5–14.5)
EST. GFR  (NO RACE VARIABLE): >60 ML/MIN/1.73 M^2
GLUCOSE SERPL-MCNC: 200 MG/DL (ref 70–110)
HCT VFR BLD AUTO: 21.9 % (ref 40–54)
HGB BLD-MCNC: 7.5 G/DL (ref 14–18)
IMM GRANULOCYTES # BLD AUTO: 0.05 K/UL (ref 0–0.04)
IMM GRANULOCYTES NFR BLD AUTO: 0.8 % (ref 0–0.5)
LYMPHOCYTES # BLD AUTO: 0.5 K/UL (ref 1–4.8)
LYMPHOCYTES NFR BLD: 8.6 % (ref 18–48)
MAGNESIUM SERPL-MCNC: 1.8 MG/DL (ref 1.6–2.6)
MCH RBC QN AUTO: 32.1 PG (ref 27–31)
MCHC RBC AUTO-ENTMCNC: 34.2 G/DL (ref 32–36)
MCV RBC AUTO: 94 FL (ref 82–98)
MONOCYTES # BLD AUTO: 0.5 K/UL (ref 0.3–1)
MONOCYTES NFR BLD: 8.6 % (ref 4–15)
NEUTROPHILS # BLD AUTO: 5 K/UL (ref 1.8–7.7)
NEUTROPHILS NFR BLD: 78.9 % (ref 38–73)
NRBC BLD-RTO: 0 /100 WBC
PHOSPHATE SERPL-MCNC: 3.8 MG/DL (ref 2.7–4.5)
PLATELET # BLD AUTO: 200 K/UL (ref 150–450)
PMV BLD AUTO: 10.9 FL (ref 9.2–12.9)
POTASSIUM SERPL-SCNC: 4.1 MMOL/L (ref 3.5–5.1)
PROT SERPL-MCNC: 5.3 G/DL (ref 6–8.4)
RBC # BLD AUTO: 2.34 M/UL (ref 4.6–6.2)
SODIUM SERPL-SCNC: 133 MMOL/L (ref 136–145)
WBC # BLD AUTO: 6.28 K/UL (ref 3.9–12.7)

## 2022-11-14 PROCEDURE — 84100 ASSAY OF PHOSPHORUS: CPT | Performed by: OTOLARYNGOLOGY

## 2022-11-14 PROCEDURE — 25000242 PHARM REV CODE 250 ALT 637 W/ HCPCS: Performed by: STUDENT IN AN ORGANIZED HEALTH CARE EDUCATION/TRAINING PROGRAM

## 2022-11-14 PROCEDURE — 63700000 PHARM REV CODE 250 ALT 637 W/O HCPCS

## 2022-11-14 PROCEDURE — 25000003 PHARM REV CODE 250: Performed by: STUDENT IN AN ORGANIZED HEALTH CARE EDUCATION/TRAINING PROGRAM

## 2022-11-14 PROCEDURE — 83735 ASSAY OF MAGNESIUM: CPT | Performed by: OTOLARYNGOLOGY

## 2022-11-14 PROCEDURE — 85025 COMPLETE CBC W/AUTO DIFF WBC: CPT | Performed by: STUDENT IN AN ORGANIZED HEALTH CARE EDUCATION/TRAINING PROGRAM

## 2022-11-14 PROCEDURE — 82330 ASSAY OF CALCIUM: CPT | Mod: 91 | Performed by: STUDENT IN AN ORGANIZED HEALTH CARE EDUCATION/TRAINING PROGRAM

## 2022-11-14 PROCEDURE — 97535 SELF CARE MNGMENT TRAINING: CPT

## 2022-11-14 PROCEDURE — 36415 COLL VENOUS BLD VENIPUNCTURE: CPT | Performed by: STUDENT IN AN ORGANIZED HEALTH CARE EDUCATION/TRAINING PROGRAM

## 2022-11-14 PROCEDURE — 97530 THERAPEUTIC ACTIVITIES: CPT

## 2022-11-14 PROCEDURE — 99900026 HC AIRWAY MAINTENANCE (STAT)

## 2022-11-14 PROCEDURE — 20600001 HC STEP DOWN PRIVATE ROOM

## 2022-11-14 PROCEDURE — 25000003 PHARM REV CODE 250

## 2022-11-14 PROCEDURE — 99900035 HC TECH TIME PER 15 MIN (STAT)

## 2022-11-14 PROCEDURE — 97116 GAIT TRAINING THERAPY: CPT

## 2022-11-14 PROCEDURE — 27000221 HC OXYGEN, UP TO 24 HOURS

## 2022-11-14 PROCEDURE — 63600175 PHARM REV CODE 636 W HCPCS

## 2022-11-14 PROCEDURE — 63600175 PHARM REV CODE 636 W HCPCS: Performed by: SURGERY

## 2022-11-14 PROCEDURE — 63600175 PHARM REV CODE 636 W HCPCS: Performed by: STUDENT IN AN ORGANIZED HEALTH CARE EDUCATION/TRAINING PROGRAM

## 2022-11-14 PROCEDURE — 25000242 PHARM REV CODE 250 ALT 637 W/ HCPCS

## 2022-11-14 PROCEDURE — 80053 COMPREHEN METABOLIC PANEL: CPT | Performed by: STUDENT IN AN ORGANIZED HEALTH CARE EDUCATION/TRAINING PROGRAM

## 2022-11-14 PROCEDURE — 94761 N-INVAS EAR/PLS OXIMETRY MLT: CPT

## 2022-11-14 RX ORDER — CALCIUM CARBONATE 1250 MG/5ML
1000 SUSPENSION ORAL
Status: CANCELLED | OUTPATIENT
Start: 2022-11-14

## 2022-11-14 RX ORDER — INSULIN ASPART 100 [IU]/ML
0-5 INJECTION, SOLUTION INTRAVENOUS; SUBCUTANEOUS
Status: DISCONTINUED | OUTPATIENT
Start: 2022-11-14 | End: 2022-11-20 | Stop reason: HOSPADM

## 2022-11-14 RX ORDER — TRIPROLIDINE/PSEUDOEPHEDRINE 2.5MG-60MG
400 TABLET ORAL EVERY 6 HOURS PRN
Status: DISCONTINUED | OUTPATIENT
Start: 2022-11-14 | End: 2022-11-20 | Stop reason: HOSPADM

## 2022-11-14 RX ORDER — POLYETHYLENE GLYCOL 3350 17 G/17G
17 POWDER, FOR SOLUTION ORAL DAILY
Status: DISCONTINUED | OUTPATIENT
Start: 2022-11-14 | End: 2022-11-20 | Stop reason: HOSPADM

## 2022-11-14 RX ORDER — CALCITRIOL 1 UG/ML
0.25 SOLUTION ORAL DAILY
Status: DISCONTINUED | OUTPATIENT
Start: 2022-11-14 | End: 2022-11-20 | Stop reason: HOSPADM

## 2022-11-14 RX ORDER — GABAPENTIN 250 MG/5ML
300 SOLUTION ORAL EVERY 8 HOURS
Status: DISCONTINUED | OUTPATIENT
Start: 2022-11-14 | End: 2022-11-20 | Stop reason: HOSPADM

## 2022-11-14 RX ORDER — OXYCODONE HCL 5 MG/5 ML
5 SOLUTION, ORAL ORAL EVERY 4 HOURS PRN
Status: DISCONTINUED | OUTPATIENT
Start: 2022-11-14 | End: 2022-11-20 | Stop reason: HOSPADM

## 2022-11-14 RX ORDER — ACETAMINOPHEN 650 MG/20.3ML
1000 LIQUID ORAL EVERY 8 HOURS
Status: COMPLETED | OUTPATIENT
Start: 2022-11-14 | End: 2022-11-17

## 2022-11-14 RX ORDER — GLUCAGON 1 MG
1 KIT INJECTION
Status: DISCONTINUED | OUTPATIENT
Start: 2022-11-14 | End: 2022-11-20 | Stop reason: HOSPADM

## 2022-11-14 RX ADMIN — GABAPENTIN 300 MG: 250 SOLUTION ORAL at 10:11

## 2022-11-14 RX ADMIN — MUPIROCIN: 20 OINTMENT TOPICAL at 08:11

## 2022-11-14 RX ADMIN — CALCIUM GLUCONATE 2 G: 20 INJECTION, SOLUTION INTRAVENOUS at 12:11

## 2022-11-14 RX ADMIN — IBUPROFEN 400 MG: 100 SUSPENSION ORAL at 02:11

## 2022-11-14 RX ADMIN — ENOXAPARIN SODIUM 40 MG: 100 INJECTION SUBCUTANEOUS at 05:11

## 2022-11-14 RX ADMIN — OXYCODONE HYDROCHLORIDE 10 MG: 5 SOLUTION ORAL at 02:11

## 2022-11-14 RX ADMIN — CALCIUM CARBONATE 1000 MG: 1250 SUSPENSION ORAL at 08:11

## 2022-11-14 RX ADMIN — TRAZODONE HYDROCHLORIDE 50 MG: 50 TABLET ORAL at 10:11

## 2022-11-14 RX ADMIN — LEVOTHYROXINE SODIUM 100 MCG: 100 TABLET ORAL at 06:11

## 2022-11-14 RX ADMIN — CALCIUM CARBONATE 1000 MG: 1250 SUSPENSION ORAL at 05:11

## 2022-11-14 RX ADMIN — MAGNESIUM SULFATE 2 G: 2 INJECTION INTRAVENOUS at 06:11

## 2022-11-14 RX ADMIN — ACETAMINOPHEN 999.01 MG: 650 SOLUTION ORAL at 11:11

## 2022-11-14 RX ADMIN — CALCITRIOL 0.25 MCG: 1 SOLUTION ORAL at 12:11

## 2022-11-14 RX ADMIN — CALCIUM CARBONATE 1000 MG: 1250 SUSPENSION ORAL at 11:11

## 2022-11-14 RX ADMIN — OXYCODONE HYDROCHLORIDE 10 MG: 5 SOLUTION ORAL at 08:11

## 2022-11-14 RX ADMIN — ACETAMINOPHEN 999.01 MG: 650 SOLUTION ORAL at 10:11

## 2022-11-14 RX ADMIN — SENNOSIDES AND DOCUSATE SODIUM 1 TABLET: 50; 8.6 TABLET ORAL at 08:11

## 2022-11-14 RX ADMIN — POLYETHYLENE GLYCOL 3350 17 G: 17 POWDER, FOR SOLUTION ORAL at 11:11

## 2022-11-14 RX ADMIN — MORPHINE SULFATE 2 MG: 2 INJECTION, SOLUTION INTRAMUSCULAR; INTRAVENOUS at 06:11

## 2022-11-14 RX ADMIN — SENNOSIDES AND DOCUSATE SODIUM 1 TABLET: 50; 8.6 TABLET ORAL at 10:11

## 2022-11-14 RX ADMIN — GABAPENTIN 300 MG: 250 SOLUTION ORAL at 11:11

## 2022-11-14 RX ADMIN — OXYCODONE HYDROCHLORIDE 5 MG: 5 SOLUTION ORAL at 11:11

## 2022-11-14 NOTE — ASSESSMENT & PLAN NOTE
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 Now s/p total glossectomy, pharyngectomy, and ALT free flap and STSG reconstruction on 11/9.        ENT/HNS:  - Q4H flap checks   - No neck ties   - Keep tracheostomy in place w/ cuff inflated  - SLP evaluation  - Bacitracin ointemnt to incision sites   -Sign above bed + outside door stating patient is "neck breather" and "can not be intubated by mouth"  -Clean laryngectomy stoma as needed, or daily  -Fresh wound care, clean incision with peroxide/qtip followed by bacitracin BID  -Humidified O2 via trach collar     Neuro:   - Q2H neuro checks   - Monitor urinary output   - Pain: Multimodality PRN regimen initiated     Cardiac:  - HDS  - Page ENT before starting vasopressors  - H/O of paroxysmal a fib, will CTM    - Tachycardic, will CTM      Pulmonary:   - PRN breathing treatments  - Humidified air per trach collar     Renal:   - monitor Is and Os  - Cr stable  - Juarez replaced 11/11     Infectious Disease:  - Daily CBC  - Unasyn completed     Hematology/Oncology:  - H/H stable  - Lovenox DVT prophylaxis      FEN/GI  - Postoperative hypoparathyroidism, iCal trending down    - Tums TID, calcitriol    - CTM    - Replace electrolytes as needed to keep K>4, Mg>2  - Bowel reg  - Protonix for GI prophylaxis  - Nausea control ondansetron, phenergan  - Continue TFs   - Continue maintenance IVF      MSK  - Out of bed as tolerated  - Drain care  - Island dressing removed, bacitracin ointment to incision site   - STSG donor site open to air      Dispo:  -OK for stepdown     "

## 2022-11-14 NOTE — PLAN OF CARE
[facility]  Discharge Reassessment    Primary Care Provider: Maico Patterson MD    Expected Discharge Date: 11/17/2022    Reassessment (most recent)       Discharge Reassessment - 11/14/22 1124          Discharge Reassessment    Assessment Type Discharge Planning Reassessment     Did the patient's condition or plan change since previous assessment? No     Discharge Plan discussed with: Spouse/sig other     Why the patient remains in the hospital Requires continued medical care

## 2022-11-14 NOTE — PLAN OF CARE
AVA.      R leg dressing removed by ENT this AM.     Afebrile. VSS. NSR 80-90s. MAP > 65 without pressors. Sats > 94% on Trach Collar 10 L / 21% medical air.     Neuro: oriented x 4, PERRLA, follows commands, moves all extremities, uses written communication board.      Flap check q2h with internal doppler. See flowsheets.      Juarez in place with adequate UOP.      Tube feeds @ goal-- 45 cc/hr.      L Leg GLENN drain with minimal serosanguineous output.      Skin: weight shift assistance provided throughout shift. Turned with wedge. No new breakdown noted. SCDs and heel boots on. New gown. Dressings changed.     Call light in reach. Bed in low position and wheels locked. Spouse at bedside.      All labs reviewed and electrolytes replaced prn.

## 2022-11-14 NOTE — PT/OT/SLP PROGRESS
"Physical Therapy Co-Treatment    Patient Name:  Cyrus Batres Jr.   MRN:  [id number]    Co-treatment performed for this visit due to patient need for two skilled therapists to ensure patient and staff safety and to accommodate for patient activity tolerance/pain management in ICU setting  Recommendations:     Discharge Recommendations:  home health PT   Discharge Equipment Recommendations: walker, rolling (pending trial)   Barriers to discharge: None    Assessment:     Cyrus Batres Jr. is a 72 y.o. male admitted with a medical diagnosis of Malignant neoplasm of base of tongue. Patient tolerated session well. Patient demonstrates improved activity tolerance as demonstrated by increased gait distance however demonstrates slight gait instability, may benefit from AD trial at next visit.     He presents with the following impairments/functional limitations: weakness, impaired endurance, impaired self care skills, impaired functional mobility, gait instability, impaired balance, decreased upper extremity function, decreased lower extremity function, pain. Once medically stable, recommending pt discharge to home health PT.    Rehab Prognosis: Good; patient continues to benefit from acute skilled PT services to address these deficits and reach maximum level of function.  Recent Surgery: Procedure(s) (LRB):  TOTAL PHARYNGECTOMY  TOTAL GLOSSECTOMY (Bilateral)  CREATION, FREE FLAP, ALT (Left)  LARYNGOSCOPY, DIRECT  DISSECTION, NECK (Bilateral)  APPLICATION, GRAFT, SKIN, SPLIT-THICKNESS (Right) 5 Days Post-Op    Plan:     During this hospitalization, patient to be seen 4 x/week to address the identified rehab impairments via gait training, therapeutic activities, neuromuscular re-education and progress toward the following goals:    Plan of Care Expires:  12/10/22    Subjective     Chief Complaint: None verbalized   Patient/Family Comments/Goals: "I had all day but not now"  Pain/Comfort:  Pain Rating 1: 0/10  Pain Rating " Post-Intervention 1: 0/10    Objective:     Communicated with RN prior to session. Patient found up in chair with telemetry, blood pressure cuff, pulse ox (continuous), peripheral IV, PEG Tube, GLENN drain, mahan catheter (laryngectomy tube, doppler, neck drain) upon PT entry to room.     General Precautions: Standard, fall   Orthopedic Precautions:N/A   Braces: N/A    Functional Mobility:  Bed Mobility:     Seated in chair at start of session and returned to chair  Transfers:     Sit to Stand: contact guard assistance with no AD  Gait: Patient ambulated 40 ft, 20 ft with hand-held assist and contact guard assistance. Patient demonstrates unsteady gait, decreased step length, narrow base of support, and decreased clarissa. Cuing for increased step size and increased JANINE. All lines remained intact throughout ambulation trial.  Balance:   Static Sitting: Good, able to maintain for 3 minute(s) with stand by assistance  Dynamic Sitting: not assessed this visit  Static Standing: Good, able to maintain for 2 minute(s) with contact guard assistance  Dynamic Standing: Fair: Patient accepts minimal challenge, contact guard assistance    AM-PAC 6 CLICK MOBILITY  Turning over in bed (including adjusting bedclothes, sheets and blankets)?: 3  Sitting down on and standing up from a chair with arms (e.g., wheelchair, bedside commode, etc.): 3  Moving from lying on back to sitting on the side of the bed?: 3  Moving to and from a bed to a chair (including a wheelchair)?: 3  Need to walk in hospital room?: 3  Climbing 3-5 steps with a railing?: 1  Basic Mobility Total Score: 16     Therapeutic Activities and Exercises:  Patient educated on role of acute care PT and PT POC  Patient is clear to ambulate to/from bathroom with RN/PCT, assist x1  Assisted with suctioning as needed 2* increased secretions  Educated about mobility progression and early progress with therapy    Patient left up in chair with all lines intact, call button in  reach, and spouse present.    GOALS:   Multidisciplinary Problems       Physical Therapy Goals          Problem: Physical Therapy    Goal Priority Disciplines Outcome Goal Variances Interventions   Physical Therapy Goal     PT, PT/OT Ongoing, Progressing     Description: Goals to be met by: 2022     Patient will increase functional independence with mobility by performin. Supine to sit with Fisher  2. Sit to supine with Fisher  3. Sit to stand transfer with Stand-by Assistance  4. Bed to chair transfer with Stand-by Assistance using No Assistive Device  5. Gait  x 100 feet with Stand-by Assistance using No Assistive Device.                          Time Tracking:     PT Received On: 22  PT Start Time: 1023     PT Stop Time: 1050  PT Total Time (min): 27 min     Billable Minutes: Gait Training 15 min and Therapeutic Activity 10 min         PT/PTA: PT     PTA Visit Number: 0     2022

## 2022-11-14 NOTE — SUBJECTIVE & OBJECTIVE
Interval History: NAEON. Tachycardic to 120s overnight. Ionized calcium just below normal. Has not gotten out of bed.     Medications:  Continuous Infusions:  Scheduled Meds:   calcitRIOL  0.25 mcg Per G Tube Daily    calcium carbonate  1,000 mg Per G Tube TID WM    enoxaparin  40 mg Subcutaneous Daily    gabapentin  300 mg Per G Tube TID    levothyroxine  100 mcg Per G Tube Before breakfast    mupirocin   Nasal BID    senna-docusate 8.6-50 mg  1 tablet Per G Tube BID    traZODone  50 mg Per G Tube QHS     PRN Meds:sodium chloride 0.9%, sodium chloride 0.9%, calcium gluconate IVPB, calcium gluconate IVPB, calcium gluconate IVPB, dextrose 10%, dextrose 10%, glucagon (human recombinant), insulin aspart U-100, magnesium sulfate IVPB, magnesium sulfate IVPB, morphine, naloxone, oxyCODONE, oxyCODONE, potassium chloride **AND** potassium chloride **AND** potassium chloride, prochlorperazine, sodium chloride 0.9%, sodium chloride 0.9%, sodium phosphate IVPB, sodium phosphate IVPB, sodium phosphate IVPB     Review of patient's allergies indicates:   Allergen Reactions    Pollen extracts     Lovastatin Rash     Not confirmed but pt skeptical     Objective:     Vital Signs (24h Range):  Temp:  [98.1 °F (36.7 °C)-99.5 °F (37.5 °C)] 99.3 °F (37.4 °C)  Pulse:  [] 107  Resp:  [11-29] 26  SpO2:  [90 %-100 %] 98 %  BP: ()/(55-82) 107/55       Lines/Drains/Airways       Central Venous Catheter Line  Duration             Port A Cath Single Lumen left subclavian -- days              Drain  Duration                  Gastrostomy/Enterostomy 06/09/22 1935 midline feeding 157 days         Closed/Suction Drain 11/09/22 1518 Left;Anterior Thigh Bulb 19 Fr. 4 days         Urethral Catheter 11/11/22 1530 Straight-tip 16 Fr. 2 days              Airway  Duration                  Surgical Airway Shiley Cuffed -- days         Laryngectomy (Do Not Remove) 06/09/22 1414 Laryngectomy stoma 157 days         Airway - Non-Surgical 11/09/22  0856 Coil Wire Tube 4 days              Peripheral Intravenous Line  Duration                  Peripheral IV - Single Lumen 11/13/22 0937 20 G Anterior;Right Forearm <1 day         Peripheral IV - Single Lumen 11/13/22 1617 18 G Anterior;Left;Proximal Forearm <1 day                    Physical Exam  Constitutional:       General: He is not in acute distress.  HENT:      Head:      Comments: Diffuse facial edema bilaterally but significantly improved from prior exam, soft to palpation     Mouth/Throat:      Comments: Flap soft to palpation, sutures to FOM intact   Eyes:      Extraocular Movements: Extraocular movements intact.   Neck:      Comments: Trach in place to stoma   Skin graft to midline neck overlying flap, appropriate drainage  Staples c/d/I on lateral neck, penroses in place bilaterally   Doppler with good venous and arterial signals   Cardiovascular:      Rate and Rhythm: Normal rate and regular rhythm.   Pulmonary:      Effort: No respiratory distress.   Neurological:      Mental Status: He is alert.     Significant Labs:  CBC:   Recent Labs   Lab 11/14/22  0347   WBC 6.28   RBC 2.34*   HGB 7.5*   HCT 21.9*      MCV 94   MCH 32.1*   MCHC 34.2     CMP:   Recent Labs   Lab 11/14/22  0347   *   CALCIUM 8.2*   ALBUMIN 2.2*   PROT 5.3*   *   K 4.1   CO2 23      BUN 24*   CREATININE 0.7   ALKPHOS 49*   ALT 8*   AST 12   BILITOT 0.5       Significant Diagnostics:  None

## 2022-11-14 NOTE — NURSING TRANSFER
Nursing Transfer Note      11/14/2022     Reason patient is being transferred: step down    Transfer To: [facility]    Transfer via wheelchair    Transfer with cardiac monitoring    Transported by RN x2    Medicines sent: yes    Any special needs or follow-up needed: space out flap checks to q4h upon arrival to [facility]    Chart send with patient: Yes    Notified: spouse at bedside    Patient reassessed at: 11/14/22 1425    Upon arrival to floor: cardiac monitor applied, patient oriented to room, call bell in reach, and bed in lowest position

## 2022-11-14 NOTE — NURSING
"Called to RM for SOA with ISAIAS. Per wife @ BS, PT coughed out trach tube, she replaced and PT "cannot breathe" per wife statement, PT confirmed with head nod "yes". SUX , induced cough, plug noted, PT SUX again and clear at this time. Educated PT on coughing and catching trach/ISAIAS prior to landing on the floor. PT nodded in understanding. Will cont to manage POC.   "

## 2022-11-14 NOTE — PT/OT/SLP PROGRESS
Speech Language Pathology      Cyrus Batres Jr.  MRN: [id number]    SLP followed up with pt to ensure laryngectomy supplies and signage in room. Pt communicating wants/needs effectively with boogie board, gestures and mouthing words. Cuffed trach remains in place at this time. SLP will follow up x1.   11/14/2022

## 2022-11-14 NOTE — ASSESSMENT & PLAN NOTE
  Neuro/Psych:   -- Sedation: None   -- Pain: Adequately controlled on arrival to the SICU. Gabapentin. PRN oxy. Ibuprofen and tylenol solution.   -- Trazodone QHS             Cards:   -- HDS     Pulm:   -- Goal O2 sat > 90%  -- Wean as able  -- pt on trach collar       Renal:  -- Keep mahan for strict I/O  -- BUN/Cr stable 24/0.7  -- Daily CMP, Mag, Phos      FEN / GI:   -- Tube feedings at 20cc/hr   -- Replace lytes as needed  -- Nutrition: NPO  -- G tube in place, ok to give meds through G tube for now but will remain NPO      ID:   -- Tm: afebrile; WBC stable   -- Abx Unasyn post op course complete    Heme/Onc:   -- H/H stable   -- Daily CBC      Endo:   -- Gluc goal 140-180  -- SSI      PPx:   Feeding: NPO, tube feedings at 20cc/hr  Analgesia/Sedation: gabapentin, PRN norco and morphine   Thromboembolic prevention: lovenox  HOB >30: yes  Stress Ulcer ppx: N/A  Glucose control: Critical care goal 140-180 g/dl, ISS    Lines/Drains/Airway: Left leg drain, G tube, mahan, neck penrose drains bl      Dispo/Code Status/Palliative:   -- step down from SICU / Full Code  -- Discussed with staff Dr. Rios

## 2022-11-14 NOTE — PROGRESS NOTES
[facility]  Otorhinolaryngology-Head & Neck Surgery  Progress Note    Subjective:     Post-Op Info:  Procedure(s) (LRB):  TOTAL PHARYNGECTOMY  TOTAL GLOSSECTOMY (Bilateral)  CREATION, FREE FLAP, ALT (Left)  LARYNGOSCOPY, DIRECT  DISSECTION, NECK (Bilateral)  APPLICATION, GRAFT, SKIN, SPLIT-THICKNESS (Right)   5 Days Post-Op  Hospital Day: 6     Interval History: NAEON. Tachycardic to 120s overnight. Ionized calcium just below normal. Has not gotten out of bed.     Medications:  Continuous Infusions:  Scheduled Meds:   calcitRIOL  0.25 mcg Per G Tube Daily    calcium carbonate  1,000 mg Per G Tube TID WM    enoxaparin  40 mg Subcutaneous Daily    gabapentin  300 mg Per G Tube TID    levothyroxine  100 mcg Per G Tube Before breakfast    mupirocin   Nasal BID    senna-docusate 8.6-50 mg  1 tablet Per G Tube BID    traZODone  50 mg Per G Tube QHS     PRN Meds:sodium chloride 0.9%, sodium chloride 0.9%, calcium gluconate IVPB, calcium gluconate IVPB, calcium gluconate IVPB, dextrose 10%, dextrose 10%, glucagon (human recombinant), insulin aspart U-100, magnesium sulfate IVPB, magnesium sulfate IVPB, morphine, naloxone, oxyCODONE, oxyCODONE, potassium chloride **AND** potassium chloride **AND** potassium chloride, prochlorperazine, sodium chloride 0.9%, sodium chloride 0.9%, sodium phosphate IVPB, sodium phosphate IVPB, sodium phosphate IVPB     Review of patient's allergies indicates:   Allergen Reactions    Pollen extracts     Lovastatin Rash     Not confirmed but pt skeptical     Objective:     Vital Signs (24h Range):  Temp:  [98.1 °F (36.7 °C)-99.5 °F (37.5 °C)] 99.3 °F (37.4 °C)  Pulse:  [] 107  Resp:  [11-29] 26  SpO2:  [90 %-100 %] 98 %  BP: ()/(55-82) 107/55       Lines/Drains/Airways       Central Venous Catheter Line  Duration             Port A Cath Single Lumen left subclavian -- days              Drain  Duration                  Gastrostomy/Enterostomy 06/09/22 1935  midline feeding 157 days         Closed/Suction Drain 11/09/22 1518 Left;Anterior Thigh Bulb 19 Fr. 4 days         Urethral Catheter 11/11/22 1530 Straight-tip 16 Fr. 2 days              Airway  Duration                  Surgical Airway Shiley Cuffed -- days         Laryngectomy (Do Not Remove) 06/09/22 1414 Laryngectomy stoma 157 days         Airway - Non-Surgical 11/09/22 0856 Coil Wire Tube 4 days              Peripheral Intravenous Line  Duration                  Peripheral IV - Single Lumen 11/13/22 0937 20 G Anterior;Right Forearm <1 day         Peripheral IV - Single Lumen 11/13/22 1617 18 G Anterior;Left;Proximal Forearm <1 day                    Physical Exam  Constitutional:       General: He is not in acute distress.  HENT:      Head:      Comments: Diffuse facial edema bilaterally but significantly improved from prior exam, soft to palpation     Mouth/Throat:      Comments: Flap soft to palpation, sutures to FOM intact   Eyes:      Extraocular Movements: Extraocular movements intact.   Neck:      Comments: Trach in place to stoma   Skin graft to midline neck overlying flap, appropriate drainage  Staples c/d/I on lateral neck, penroses in place bilaterally   Doppler with good venous and arterial signals   Cardiovascular:      Rate and Rhythm: Normal rate and regular rhythm.   Pulmonary:      Effort: No respiratory distress.   Neurological:      Mental Status: He is alert.     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 Now s/p total glossectomy, pharyngectomy, and ALT free flap and STSG reconstruction on 11/9.        ENT/HNS:  - Q4H flap checks   - No neck ties   - Keep tracheostomy in place w/ cuff inflated  - SLP evaluation  - Bacitracin ointemnt to incision sites   -Sign above bed + outside door stating patient is "neck breather" and "can not be intubated by mouth"  -Clean laryngectomy stoma as needed, or daily  -Fresh wound care, clean incision with peroxide/qtip followed by bacitracin BID  -Humidified O2 via trach collar     Neuro:   - Q4H neuro checks   - Monitor urinary output   - Pain: Multimodality PRN regimen initiated     Cardiac:  - HDS  - Page ENT before starting vasopressors  - H/O of paroxysmal a fib, will CTM    - Tachycardic, will CTM      Pulmonary:   - PRN breathing treatments  - Humidified air per trach collar     Renal:   - monitor Is and Os  - Cr stable  - Juarez replaced 11/11     Infectious Disease:  - Daily CBC  - Unasyn completed     Hematology/Oncology:  - H/H stable  - Lovenox DVT prophylaxis      FEN/GI  - Postoperative hypoparathyroidism, iCal trending down    - Tums TID, calcitriol    - CTM    - Replace electrolytes as needed to keep K>4, Mg>2  - Bowel reg  - Protonix for GI prophylaxis  - Nausea control ondansetron, phenergan  - Continue TFs   - Continue maintenance IVF      MSK  - Out of bed as tolerated  - Drain care  - Island dressing removed, bacitracin ointment to incision site   - STSG donor site open to air      Dispo:  -OK for stepdown         Jo Ann Bryant MD  Otorhinolaryngology-Head & Neck Surgery  [facility] - [facility]  "

## 2022-11-14 NOTE — SUBJECTIVE & OBJECTIVE
Interval History/Significant Events: NAEO. Pt has been HDS and flap checks have been appropriate and without concern. Step down today.    Follow-up For: Procedure(s) (LRB):  TOTAL PHARYNGECTOMY  TOTAL GLOSSECTOMY (Bilateral)  CREATION, FREE FLAP, ALT (Left)  LARYNGOSCOPY, DIRECT  DISSECTION, NECK (Bilateral)  APPLICATION, GRAFT, SKIN, SPLIT-THICKNESS (Right)    Post-Operative Day: 5 Days Post-Op    Objective:     Vital Signs (Most Recent):  Temp: 99.1 °F (37.3 °C) (11/14/22 0715)  Pulse: 94 (11/14/22 0800)  Resp: 18 (11/14/22 0820)  BP: (!) 112/57 (11/14/22 0800)  SpO2: (!) 91 % (11/14/22 0800)   Vital Signs (24h Range):  Temp:  [98.4 °F (36.9 °C)-99.5 °F (37.5 °C)] 99.1 °F (37.3 °C)  Pulse:  [] 94  Resp:  [11-29] 18  SpO2:  [90 %-100 %] 91 %  BP: ()/(55-82) 112/57     Weight: 73.3 kg (161 lb 9.6 oz)  Body mass index is 26.08 kg/m².      Intake/Output Summary (Last 24 hours) at 11/14/2022 1011  Last data filed at 11/14/2022 0900  Gross per 24 hour   Intake 1871.3 ml   Output 1800 ml   Net 71.3 ml       Physical Exam  HENT:      Head:      Comments: Extensive facial swelling   Eyes:      Extraocular Movements: Extraocular movements intact.      Conjunctiva/sclera: Conjunctivae normal.      Pupils: Pupils are equal, round, and reactive to light.   Neck:      Comments: Flap present and intact, sutures in place   Cardiovascular:      Rate and Rhythm: Normal rate and regular rhythm.      Pulses: Normal pulses.      Heart sounds: Normal heart sounds.   Pulmonary:      Effort: Pulmonary effort is normal. No respiratory distress.      Breath sounds: Normal breath sounds.      Comments: Oxygen through trach collar   Abdominal:      General: Abdomen is flat. There is no distension.      Palpations: Abdomen is soft.      Tenderness: There is no abdominal tenderness.   Musculoskeletal:         General: Swelling present.   Skin:     Capillary Refill: Capillary refill takes 2 to 3 seconds.      Comments: Surgical  incision site over LLE CDI   Neurological:      General: No focal deficit present.      Mental Status: He is alert and oriented to person, place, and time. Mental status is at baseline.      Cranial Nerves: No cranial nerve deficit.      Motor: No weakness.   Psychiatric:         Mood and Affect: Mood normal.         Behavior: Behavior normal.         Thought Content: Thought content normal.         Judgment: Judgment normal.       Vents:  Oxygen Concentration (%): 21 (11/14/22 0830)    Lines/Drains/Airways       Central Venous Catheter Line  Duration             Port A Cath Single Lumen left subclavian -- days              Drain  Duration                  Open Drain Anterior;Left Neck Penrose -- days         Open Drain Right;Anterior Neck Penrose -- days         Gastrostomy/Enterostomy 06/09/22 1935 midline feeding 157 days         Closed/Suction Drain 11/09/22 1518 Left;Anterior Thigh Bulb 19 Fr. 4 days         Urethral Catheter 11/11/22 1530 Straight-tip 16 Fr. 2 days              Airway  Duration                  Surgical Airway Shiley Cuffed -- days         Laryngectomy (Do Not Remove) 06/09/22 1414 Laryngectomy stoma 157 days         Airway - Non-Surgical 11/09/22 0856 Coil Wire Tube 5 days              Peripheral Intravenous Line  Duration                  Peripheral IV - Single Lumen 11/13/22 0937 20 G Anterior;Right Forearm 1 day         Peripheral IV - Single Lumen 11/13/22 1617 18 G Anterior;Left;Proximal Forearm <1 day                    Significant Labs:    CBC/Anemia Profile:  Recent Labs   Lab 11/13/22  0412 11/14/22  0347   WBC 5.64 6.28   HGB 7.6* 7.5*   HCT 22.6* 21.9*    200   MCV 95 94   RDW 14.0 13.1        Chemistries:  Recent Labs   Lab 11/12/22  1507 11/13/22  0538 11/13/22  1540 11/14/22  0347    134*  --  133*   K  --  4.3  --  4.1   CL  --  105  --  102   CO2  --  20*  --  23   BUN  --  24*  --  24*   CREATININE  --  0.8  --  0.7   CALCIUM  --  12.0*  --  8.2*   ALBUMIN  --   2.3*  --  2.2*   PROT  --  5.3*  --  5.3*   BILITOT  --  0.4  --  0.5   ALKPHOS  --  48*  --  49*   ALT  --  7*  --  8*   AST  --  17  --  12   MG  --  1.5* 1.6 1.8   PHOS  --  3.8  --  3.8       All pertinent labs within the past 24 hours have been reviewed.    Significant Imaging:  I have reviewed all pertinent imaging results/findings within the past 24 hours.

## 2022-11-14 NOTE — PROGRESS NOTES
[facility]  Critical Care - Surgery  Progress Note    Patient Name: Cyrus Batres Jr.  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 He presents to the OR today with ENT for Pharyngectomy, glossectomy, and free flap creation. Pt being admitted directly to the SICU from the OR. Of note, per ENT pt has trach in place to avoid secretions going down into his lungs. Pt also has venous and arterial doppler wires placed within the surgical site for q1H monitoring. Also informed to expect extreme swelling in the post op period.      On arrival to the SICU, pt HDS and satting 100% via trach collar on RA. Pt received 4L of crystalloid during the case along with one unit of pRBCs. He is off of vasopressors on arrival as well. He has a left radial a-line in place for BP monitoring. He has a left leg drain from graft taken and right leg skin graft wound with surgical bandage in place. Pt also has G tube in place along with mahan for UOP monitoring.      Interval History/Significant Events: NAEO. Pt has been HDS and flap checks have been appropriate and without concern. Step down today.    Follow-up For: Procedure(s) (LRB):  TOTAL PHARYNGECTOMY  TOTAL GLOSSECTOMY (Bilateral)  CREATION, FREE FLAP, ALT (Left)  LARYNGOSCOPY, DIRECT  DISSECTION, NECK (Bilateral)  APPLICATION, GRAFT, SKIN, SPLIT-THICKNESS (Right)    Post-Operative Day: 5 Days Post-Op    Objective:     Vital Signs (Most Recent):  Temp: 99.1 °F (37.3 °C) (11/14/22 0715)  Pulse: 94 (11/14/22 0800)  Resp: 18 (11/14/22 0820)  BP: (!) 112/57 (11/14/22  0800)  SpO2: (!) 91 % (11/14/22 0800)   Vital Signs (24h Range):  Temp:  [98.4 °F (36.9 °C)-99.5 °F (37.5 °C)] 99.1 °F (37.3 °C)  Pulse:  [] 94  Resp:  [11-29] 18  SpO2:  [90 %-100 %] 91 %  BP: ()/(55-82) 112/57     Weight: 73.3 kg (161 lb 9.6 oz)  Body mass index is 26.08 kg/m².      Intake/Output Summary (Last 24 hours) at 11/14/2022 1011  Last data filed at 11/14/2022 0900  Gross per 24 hour   Intake 1871.3 ml   Output 1800 ml   Net 71.3 ml       Physical Exam  HENT:      Head:      Comments: Extensive facial swelling   Eyes:      Extraocular Movements: Extraocular movements intact.      Conjunctiva/sclera: Conjunctivae normal.      Pupils: Pupils are equal, round, and reactive to light.   Neck:      Comments: Flap present and intact, sutures in place   Cardiovascular:      Rate and Rhythm: Normal rate and regular rhythm.      Pulses: Normal pulses.      Heart sounds: Normal heart sounds.   Pulmonary:      Effort: Pulmonary effort is normal. No respiratory distress.      Breath sounds: Normal breath sounds.      Comments: Oxygen through trach collar   Abdominal:      General: Abdomen is flat. There is no distension.      Palpations: Abdomen is soft.      Tenderness: There is no abdominal tenderness.   Musculoskeletal:         General: Swelling present.   Skin:     Capillary Refill: Capillary refill takes 2 to 3 seconds.      Comments: Surgical incision site over LLE CDI   Neurological:      General: No focal deficit present.      Mental Status: He is alert and oriented to person, place, and time. Mental status is at baseline.      Cranial Nerves: No cranial nerve deficit.      Motor: No weakness.   Psychiatric:         Mood and Affect: Mood normal.         Behavior: Behavior normal.         Thought Content: Thought content normal.         Judgment: Judgment normal.       Vents:  Oxygen Concentration (%): 21 (11/14/22 0830)    Lines/Drains/Airways       Central Venous Catheter Line  Duration              Port A Cath Single Lumen left subclavian -- days              Drain  Duration                  Open Drain Anterior;Left Neck Penrose -- days         Open Drain Right;Anterior Neck Penrose -- days         Gastrostomy/Enterostomy 06/09/22 1935 midline feeding 157 days         Closed/Suction Drain 11/09/22 1518 Left;Anterior Thigh Bulb 19 Fr. 4 days         Urethral Catheter 11/11/22 1530 Straight-tip 16 Fr. 2 days              Airway  Duration                  Surgical Airway Shiley Cuffed -- days         Laryngectomy (Do Not Remove) 06/09/22 1414 Laryngectomy stoma 157 days         Airway - Non-Surgical 11/09/22 0856 Coil Wire Tube 5 days              Peripheral Intravenous Line  Duration                  Peripheral IV - Single Lumen 11/13/22 0937 20 G Anterior;Right Forearm 1 day         Peripheral IV - Single Lumen 11/13/22 1617 18 G Anterior;Left;Proximal Forearm <1 day                    Significant Labs:    CBC/Anemia Profile:  Recent Labs   Lab 11/13/22  0412 11/14/22  0347   WBC 5.64 6.28   HGB 7.6* 7.5*   HCT 22.6* 21.9*    200   MCV 95 94   RDW 14.0 13.1        Chemistries:  Recent Labs   Lab 11/12/22  1507 11/13/22  0538 11/13/22  1540 11/14/22  0347    134*  --  133*   K  --  4.3  --  4.1   CL  --  105  --  102   CO2  --  20*  --  23   BUN  --  24*  --  24*   CREATININE  --  0.8  --  0.7   CALCIUM  --  12.0*  --  8.2*   ALBUMIN  --  2.3*  --  2.2*   PROT  --  5.3*  --  5.3*   BILITOT  --  0.4  --  0.5   ALKPHOS  --  48*  --  49*   ALT  --  7*  --  8*   AST  --  17  --  12   MG  --  1.5* 1.6 1.8   PHOS  --  3.8  --  3.8       All pertinent labs within the past 24 hours have been reviewed.    Significant Imaging:  I have reviewed all pertinent imaging results/findings within the past 24 hours.    Assessment/Plan:     * Malignant neoplasm of base of tongue    Neuro/Psych:   -- Sedation: None   -- Pain: Adequately controlled on arrival to the SICU. Gabapentin. PRN oxy. Ibuprofen and tylenol  solution.   -- Trazodone QHS             Cards:   -- HDS     Pulm:   -- Goal O2 sat > 90%  -- Wean as able  -- pt on trach collar       Renal:  -- Keep mahan for strict I/O  -- BUN/Cr stable 24/0.7  -- Daily CMP, Mag, Phos      FEN / GI:   -- Tube feedings at 20cc/hr   -- Replace lytes as needed  -- Nutrition: NPO  -- G tube in place, ok to give meds through G tube for now but will remain NPO      ID:   -- Tm: afebrile; WBC stable   -- Abx Unasyn post op course complete    Heme/Onc:   -- H/H stable   -- Daily CBC      Endo:   -- Gluc goal 140-180  -- SSI      PPx:   Feeding: NPO, tube feedings at 20cc/hr  Analgesia/Sedation: gabapentin, PRN norco and morphine   Thromboembolic prevention: lovenox  HOB >30: yes  Stress Ulcer ppx: N/A  Glucose control: Critical care goal 140-180 g/dl, ISS    Lines/Drains/Airway: Left leg drain, G tube, mahan, neck penrose drains bl      Dispo/Code Status/Palliative:   -- step down from SICU / Full Code  -- Discussed with staff Dr. Blanca Aquino MD  Critical Care - Surgery  [facility]

## 2022-11-14 NOTE — PT/OT/SLP PROGRESS
Occupational Therapy   Co-Treatment    Name: Cyrus Batres Jr.  MRN: [id number]  Admitting Diagnosis:  Malignant neoplasm of base of tongue  5 Days Post-Op    Recommendations:     Discharge Recommendations: home health OT, other (see comments)  Discharge Equipment Recommendations:  walker, rolling  Barriers to discharge:  None    Assessment:     Cyrus Batres Jr. is a 72 y.o. male with a medical diagnosis of Malignant neoplasm of base of tongue. Performance deficits affecting function are weakness, impaired self care skills, impaired balance, decreased coordination, decreased safety awareness, impaired endurance, impaired functional mobility, impaired sensation, gait instability, decreased lower extremity function, pain.     Pt tolerated session well, utilizing written communication and hand gestures as primary communication with therapists. Pt req'd contact-guard assist fxnl transfers, and  Min-max A  w/ ADLs at sit/stand level on this date as further detailed below. Pt is progressing towards fxnl goals, increasing activity tolerance at sit/stand level; but is still functioning below PLOF. Cont POC with D/C rec to  HHOT .  Plan:     Patient to be seen 3 x/week to address the above listed problems via self-care/home management, therapeutic activities, therapeutic exercises, neuromuscular re-education  Plan of Care Expires: 12/10/22  Plan of Care Reviewed with: patient    Subjective     Pain/Comfort:  Pain Rating 1: 0/10    Objective:     Communicated with: nurse prior to session.  Patient found up in chair with telemetry, blood pressure cuff, pulse ox (continuous), peripheral IV, GLENN drain, mahan catheter, PEG Tube (laryngectomy tube, doppler, neck drain) upon OT entry to room.    General Precautions: Standard, fall   Orthopedic Precautions:N/A   Braces: N/A    Functional Mobility/Transfers:  Patient completed Sit <> Stand Transfer with contact guard assistance  with  no assistive device   Functional Mobility: Pt  "engaged in functional mobility to simulate household/community distances with CGA with no AD,  in order to maximize functional activity tolerance required for engagement in occupations of choice.    Activities of Daily Living:  Grooming: stand by assistance to wash face and grab paper towels to wipe face/lips while standing at sink  for ~5 minutes   LB Dress: max A to malinda b/l socks while seated EOC      [facility] 6 Click ADL: 9    Treatment & Education:  Pt engaged in executive functioning activity of playing "First Opinion" on written communication board for ~3 minutes w/ 100% accuracy with filling in the blanks of the answer with written questions, I.e.,  "what is the name of this [facility] [place]?"  Pt educated on role of OT, POC, and goals for therapy.    POC was dicussed with patient/caregiver, who was included in its development and is in agreement with the identified goals and treatment plan.   Patient and family aware of patient's deficits and therapy progression.   Time provided for therapeutic counseling and discussion of health disposition.   Educated on importance of EOB/OOB mobility, maintaining routine, sitting up in chair, and maximizing independence with ADLs during admission   Pt completed ADLs and functional mobility for treatment session as noted above   Pt/caregiver verbalized understanding and expressed no further concerns/questions.      Patient left up in chair with all lines intact and call button in reach    GOALS:   Multidisciplinary Problems       Occupational Therapy Goals          Problem: Occupational Therapy    Goal Priority Disciplines Outcome Interventions   Occupational Therapy Goal     OT, PT/OT Ongoing, Progressing    Description: Goals to be met by: 11/24/22     Patient will increase functional independence with ADLs by performing:    UE Dressing with [place].  LE Dressing with Stand-by Assistance.  Grooming while standing at sink with Supervision.  Toileting from toilet with Supervision for " hygiene and clothing management.   Toilet transfer to toilet with Supervision.                         Time Tracking:     OT Date of Treatment: 11/14/22  OT Start Time: 1023  OT Stop Time: 1050  OT Total Time (min): 27 min    Billable Minutes:Self Care/Home Management 10  Therapeutic Activity 17    OT/HENRY: OT     HENRY Visit Number: 0    11/14/2022

## 2022-11-15 LAB
ALBUMIN SERPL BCP-MCNC: 2.3 G/DL (ref 3.5–5.2)
ALP SERPL-CCNC: 56 U/L (ref 55–135)
ALT SERPL W/O P-5'-P-CCNC: 8 U/L (ref 10–44)
ANION GAP SERPL CALC-SCNC: 8 MMOL/L (ref 8–16)
AST SERPL-CCNC: 10 U/L (ref 10–40)
BASOPHILS # BLD AUTO: 0.02 K/UL (ref 0–0.2)
BASOPHILS # BLD AUTO: 0.02 K/UL (ref 0–0.2)
BASOPHILS NFR BLD: 0.3 % (ref 0–1.9)
BASOPHILS NFR BLD: 0.3 % (ref 0–1.9)
BILIRUB SERPL-MCNC: 0.4 MG/DL (ref 0.1–1)
BUN SERPL-MCNC: 27 MG/DL (ref 8–23)
CA-I BLDV-SCNC: 1.06 MMOL/L (ref 1.06–1.42)
CA-I BLDV-SCNC: 1.1 MMOL/L (ref 1.06–1.42)
CA-I BLDV-SCNC: 1.11 MMOL/L (ref 1.06–1.42)
CALCIUM SERPL-MCNC: 8.2 MG/DL (ref 8.7–10.5)
CHLORIDE SERPL-SCNC: 101 MMOL/L (ref 95–110)
CO2 SERPL-SCNC: 25 MMOL/L (ref 23–29)
CREAT SERPL-MCNC: 0.7 MG/DL (ref 0.5–1.4)
DIFFERENTIAL METHOD: ABNORMAL
DIFFERENTIAL METHOD: ABNORMAL
EOSINOPHIL # BLD AUTO: 0.3 K/UL (ref 0–0.5)
EOSINOPHIL # BLD AUTO: 0.3 K/UL (ref 0–0.5)
EOSINOPHIL NFR BLD: 4.9 % (ref 0–8)
EOSINOPHIL NFR BLD: 4.9 % (ref 0–8)
ERYTHROCYTE [DISTWIDTH] IN BLOOD BY AUTOMATED COUNT: 13 % (ref 11.5–14.5)
ERYTHROCYTE [DISTWIDTH] IN BLOOD BY AUTOMATED COUNT: 13 % (ref 11.5–14.5)
EST. GFR  (NO RACE VARIABLE): >60 ML/MIN/1.73 M^2
GLUCOSE SERPL-MCNC: 190 MG/DL (ref 70–110)
HCT VFR BLD AUTO: 24.7 % (ref 40–54)
HCT VFR BLD AUTO: 24.7 % (ref 40–54)
HGB BLD-MCNC: 8.3 G/DL (ref 14–18)
HGB BLD-MCNC: 8.3 G/DL (ref 14–18)
IMM GRANULOCYTES # BLD AUTO: 0.12 K/UL (ref 0–0.04)
IMM GRANULOCYTES # BLD AUTO: 0.12 K/UL (ref 0–0.04)
IMM GRANULOCYTES NFR BLD AUTO: 2 % (ref 0–0.5)
IMM GRANULOCYTES NFR BLD AUTO: 2 % (ref 0–0.5)
LYMPHOCYTES # BLD AUTO: 0.7 K/UL (ref 1–4.8)
LYMPHOCYTES # BLD AUTO: 0.7 K/UL (ref 1–4.8)
LYMPHOCYTES NFR BLD: 12 % (ref 18–48)
LYMPHOCYTES NFR BLD: 12 % (ref 18–48)
MAGNESIUM SERPL-MCNC: 1.7 MG/DL (ref 1.6–2.6)
MCH RBC QN AUTO: 31.8 PG (ref 27–31)
MCH RBC QN AUTO: 31.8 PG (ref 27–31)
MCHC RBC AUTO-ENTMCNC: 33.6 G/DL (ref 32–36)
MCHC RBC AUTO-ENTMCNC: 33.6 G/DL (ref 32–36)
MCV RBC AUTO: 95 FL (ref 82–98)
MCV RBC AUTO: 95 FL (ref 82–98)
MONOCYTES # BLD AUTO: 0.4 K/UL (ref 0.3–1)
MONOCYTES # BLD AUTO: 0.4 K/UL (ref 0.3–1)
MONOCYTES NFR BLD: 7.3 % (ref 4–15)
MONOCYTES NFR BLD: 7.3 % (ref 4–15)
NEUTROPHILS # BLD AUTO: 4.3 K/UL (ref 1.8–7.7)
NEUTROPHILS # BLD AUTO: 4.3 K/UL (ref 1.8–7.7)
NEUTROPHILS NFR BLD: 73.5 % (ref 38–73)
NEUTROPHILS NFR BLD: 73.5 % (ref 38–73)
NRBC BLD-RTO: 0 /100 WBC
NRBC BLD-RTO: 0 /100 WBC
PHOSPHATE SERPL-MCNC: 3.3 MG/DL (ref 2.7–4.5)
PLATELET # BLD AUTO: 238 K/UL (ref 150–450)
PLATELET # BLD AUTO: 238 K/UL (ref 150–450)
PMV BLD AUTO: 10.3 FL (ref 9.2–12.9)
PMV BLD AUTO: 10.3 FL (ref 9.2–12.9)
POCT GLUCOSE: 179 MG/DL (ref 70–110)
POCT GLUCOSE: 195 MG/DL (ref 70–110)
POCT GLUCOSE: 197 MG/DL (ref 70–110)
POCT GLUCOSE: 204 MG/DL (ref 70–110)
POCT GLUCOSE: 205 MG/DL (ref 70–110)
POCT GLUCOSE: 231 MG/DL (ref 70–110)
POCT GLUCOSE: 267 MG/DL (ref 70–110)
POTASSIUM SERPL-SCNC: 3.8 MMOL/L (ref 3.5–5.1)
PROT SERPL-MCNC: 5.7 G/DL (ref 6–8.4)
RBC # BLD AUTO: 2.61 M/UL (ref 4.6–6.2)
RBC # BLD AUTO: 2.61 M/UL (ref 4.6–6.2)
SODIUM SERPL-SCNC: 134 MMOL/L (ref 136–145)
WBC # BLD AUTO: 5.9 K/UL (ref 3.9–12.7)
WBC # BLD AUTO: 5.9 K/UL (ref 3.9–12.7)

## 2022-11-15 PROCEDURE — 99900035 HC TECH TIME PER 15 MIN (STAT)

## 2022-11-15 PROCEDURE — 27000221 HC OXYGEN, UP TO 24 HOURS

## 2022-11-15 PROCEDURE — 83735 ASSAY OF MAGNESIUM: CPT | Performed by: OTOLARYNGOLOGY

## 2022-11-15 PROCEDURE — 85025 COMPLETE CBC W/AUTO DIFF WBC: CPT | Performed by: STUDENT IN AN ORGANIZED HEALTH CARE EDUCATION/TRAINING PROGRAM

## 2022-11-15 PROCEDURE — 25000242 PHARM REV CODE 250 ALT 637 W/ HCPCS: Performed by: STUDENT IN AN ORGANIZED HEALTH CARE EDUCATION/TRAINING PROGRAM

## 2022-11-15 PROCEDURE — 99900026 HC AIRWAY MAINTENANCE (STAT)

## 2022-11-15 PROCEDURE — 20600001 HC STEP DOWN PRIVATE ROOM

## 2022-11-15 PROCEDURE — 36415 COLL VENOUS BLD VENIPUNCTURE: CPT | Performed by: STUDENT IN AN ORGANIZED HEALTH CARE EDUCATION/TRAINING PROGRAM

## 2022-11-15 PROCEDURE — 97116 GAIT TRAINING THERAPY: CPT

## 2022-11-15 PROCEDURE — 25000003 PHARM REV CODE 250: Performed by: STUDENT IN AN ORGANIZED HEALTH CARE EDUCATION/TRAINING PROGRAM

## 2022-11-15 PROCEDURE — 80053 COMPREHEN METABOLIC PANEL: CPT | Performed by: STUDENT IN AN ORGANIZED HEALTH CARE EDUCATION/TRAINING PROGRAM

## 2022-11-15 PROCEDURE — 94761 N-INVAS EAR/PLS OXIMETRY MLT: CPT

## 2022-11-15 PROCEDURE — 63700000 PHARM REV CODE 250 ALT 637 W/O HCPCS

## 2022-11-15 PROCEDURE — 25000003 PHARM REV CODE 250

## 2022-11-15 PROCEDURE — 63600175 PHARM REV CODE 636 W HCPCS: Performed by: STUDENT IN AN ORGANIZED HEALTH CARE EDUCATION/TRAINING PROGRAM

## 2022-11-15 PROCEDURE — 82330 ASSAY OF CALCIUM: CPT | Performed by: STUDENT IN AN ORGANIZED HEALTH CARE EDUCATION/TRAINING PROGRAM

## 2022-11-15 PROCEDURE — 84100 ASSAY OF PHOSPHORUS: CPT | Performed by: OTOLARYNGOLOGY

## 2022-11-15 PROCEDURE — 63600175 PHARM REV CODE 636 W HCPCS: Performed by: OTOLARYNGOLOGY

## 2022-11-15 RX ADMIN — CALCIUM CARBONATE 1000 MG: 1250 SUSPENSION ORAL at 04:11

## 2022-11-15 RX ADMIN — ENOXAPARIN SODIUM 40 MG: 100 INJECTION SUBCUTANEOUS at 04:11

## 2022-11-15 RX ADMIN — CALCIUM CARBONATE 1000 MG: 1250 SUSPENSION ORAL at 12:11

## 2022-11-15 RX ADMIN — ACETAMINOPHEN 999.01 MG: 650 SOLUTION ORAL at 05:11

## 2022-11-15 RX ADMIN — ACETAMINOPHEN 999.01 MG: 650 SOLUTION ORAL at 10:11

## 2022-11-15 RX ADMIN — INSULIN ASPART 2 UNITS: 100 INJECTION, SOLUTION INTRAVENOUS; SUBCUTANEOUS at 12:11

## 2022-11-15 RX ADMIN — POLYETHYLENE GLYCOL 3350 17 G: 17 POWDER, FOR SOLUTION ORAL at 09:11

## 2022-11-15 RX ADMIN — CALCIUM CARBONATE 1000 MG: 1250 SUSPENSION ORAL at 07:11

## 2022-11-15 RX ADMIN — GABAPENTIN 300 MG: 250 SOLUTION ORAL at 10:11

## 2022-11-15 RX ADMIN — ACETAMINOPHEN 999.01 MG: 650 SOLUTION ORAL at 01:11

## 2022-11-15 RX ADMIN — LEVOTHYROXINE SODIUM 100 MCG: 100 TABLET ORAL at 06:11

## 2022-11-15 RX ADMIN — SENNOSIDES AND DOCUSATE SODIUM 1 TABLET: 50; 8.6 TABLET ORAL at 09:11

## 2022-11-15 RX ADMIN — GABAPENTIN 300 MG: 250 SOLUTION ORAL at 01:11

## 2022-11-15 RX ADMIN — GABAPENTIN 300 MG: 250 SOLUTION ORAL at 05:11

## 2022-11-15 RX ADMIN — OXYCODONE HYDROCHLORIDE 5 MG: 5 SOLUTION ORAL at 07:11

## 2022-11-15 RX ADMIN — INSULIN ASPART 1 UNITS: 100 INJECTION, SOLUTION INTRAVENOUS; SUBCUTANEOUS at 12:11

## 2022-11-15 RX ADMIN — CALCITRIOL 0.25 MCG: 1 SOLUTION ORAL at 09:11

## 2022-11-15 NOTE — SUBJECTIVE & OBJECTIVE
Interval History: Had episode of trach dislodging following cough, found to have mucus plug. Stepped down yesterday.     Medications:  Continuous Infusions:  Scheduled Meds:   acetaminophen  999.0148 mg Per G Tube Q8H    calcitrioL  0.25 mcg Per G Tube Daily    calcium carbonate  1,000 mg Per G Tube TID WM    enoxaparin  40 mg Subcutaneous Daily    gabapentin  300 mg Per G Tube Q8H    levothyroxine  100 mcg Per G Tube Before breakfast    polyethylene glycol  17 g Per G Tube Daily    senna-docusate 8.6-50 mg  1 tablet Per G Tube BID    traZODone  50 mg Per G Tube QHS     PRN Meds:sodium chloride 0.9%, sodium chloride 0.9%, dextrose 10%, dextrose 10%, glucagon (human recombinant), ibuprofen, insulin aspart U-100, naloxone, oxyCODONE, prochlorperazine, sodium chloride 0.9%, sodium chloride 0.9%     Review of patient's allergies indicates:   Allergen Reactions    Pollen extracts     Lovastatin Rash     Not confirmed but pt skeptical     Objective:     Vital Signs (24h Range):  Temp:  [97.1 °F (36.2 °C)-99 °F (37.2 °C)] 98.1 °F (36.7 °C)  Pulse:  [] 81  Resp:  [11-28] 16  SpO2:  [91 %-96 %] 92 %  BP: ()/(51-71) 132/65       Lines/Drains/Airways       Central Venous Catheter Line  Duration             Port A Cath Single Lumen left subclavian -- days              Drain  Duration                  Open Drain Anterior;Left Neck Penrose -- days         Open Drain Right;Anterior Neck Penrose -- days         Gastrostomy/Enterostomy 06/09/22 1935 midline feeding 158 days         Closed/Suction Drain 11/09/22 1518 Left;Anterior Thigh Bulb 19 Fr. 5 days         Urethral Catheter 11/11/22 1530 Straight-tip 16 Fr. 3 days              Airway  Duration                  Surgical Airway Shiley Cuffed -- days         Laryngectomy (Do Not Remove) 06/09/22 1414 Laryngectomy stoma 158 days         Airway - Non-Surgical 11/09/22 0856 Coil Wire Tube 5 days              Peripheral Intravenous Line  Duration                   Peripheral IV - Single Lumen 11/13/22 0937 20 G Anterior;Right Forearm 1 day         Peripheral IV - Single Lumen 11/13/22 1617 18 G Anterior;Left;Proximal Forearm 1 day                    Physical Exam  Constitutional:       General: He is not in acute distress.  HENT:      Head:      Comments: Diffuse facial edema bilaterally but significantly improved from prior exam, soft to palpation     Mouth/Throat:      Comments: Flap soft to palpation, sutures to FOM intact   Eyes:      Extraocular Movements: Extraocular movements intact.   Neck:      Comments: Trach in place to stoma   Skin graft to midline neck overlying flap, appropriate drainage  Staples c/d/I on lateral neck, penroses in place bilaterally   Doppler with good venous and arterial signals   Cardiovascular:      Rate and Rhythm: Normal rate and regular rhythm.   Pulmonary:      Effort: No respiratory distress.   Neurological:      Mental Status: He is alert.     Significant Labs:  CBC:   Recent Labs   Lab 11/14/22  0347   WBC 6.28   RBC 2.34*   HGB 7.5*   HCT 21.9*      MCV 94   MCH 32.1*   MCHC 34.2     CMP:   Recent Labs   Lab 11/15/22  0610   *   CALCIUM 8.2*   ALBUMIN 2.3*   PROT 5.7*   *   K 3.8   CO2 25      BUN 27*   CREATININE 0.7   ALKPHOS 56   ALT 8*   AST 10   BILITOT 0.4       Significant Diagnostics:  None

## 2022-11-15 NOTE — PT/OT/SLP PROGRESS
Physical Therapy Treatment    Patient Name:  Cyrus Batres Jr.   MRN:  [id number]    Recommendations:     Discharge Recommendations: home no needs  Discharge Equipment Recommendations:  (will determine DME Needs closer to discharge)   Barriers to discharge: None    Assessment:     Cyrus Batres Jr. is a 72 y.o. male admitted with a medical diagnosis of Malignant neoplasm of base of tongue.  He presents with the following impairments/functional limitations:  impaired endurance, impaired functional mobility, gait instability, impaired balance, decreased safety awareness pt tolerated treatment better being able to gait train farther. Pt will benefit from a few more sessions of PT to progress physically. Pt will be able to discharge home with no needs.  Pt is s/p B neck dissection, glossectomy etc 11/10/22.    Rehab Prognosis: Good; patient would benefit from acute skilled PT services to address these deficits and reach maximum level of function.    Recent Surgery: Procedure(s) (LRB):  TOTAL PHARYNGECTOMY  TOTAL GLOSSECTOMY (Bilateral)  CREATION, FREE FLAP, ALT (Left)  LARYNGOSCOPY, DIRECT  DISSECTION, NECK (Bilateral)  APPLICATION, GRAFT, SKIN, SPLIT-THICKNESS (Right) 6 Days Post-Op    Plan:     During this hospitalization, patient to be seen 4 x/week to address the identified rehab impairments via gait training, therapeutic activities and progress toward the following goals:    Plan of Care Expires:  12/10/22    Subjective     Chief Complaint: pt c/o slight pain during treatment.   Patient/Family Comments/goals:  to get better and go home.   Pain/Comfort:  Pain Rating 1: 4/10 (neck)  Pain Addressed 1: Reposition, Distraction  Pain Rating Post-Intervention 1: 4/10 (neck)      Objective:     Communicated with nurse prior to session.  Patient found supine with Tracheostomy, PEG Tube, mahan catheter, GLENN drain (hep lock IV, internal doppler neck) upon PT entry to room.     General Precautions: Standard, fall   Orthopedic  "Precautions:N/A   Braces:    Respiratory Status:  trach collar. RT stated that O2 was for humidification only and did not need to used for gait training.      Functional Mobility:  Bed Mobility:   pt needed verbal cues for hand placement and sequencing for functional mobility.  Rolling Right: stand by assistance  Supine to Sit: stand by assistance    Transfers:     Sit to Stand:  contact guard assistance with hand-held assist. Pt stood from bed, bedside chair and toilet.    Gait: pt received gait training ~ 250 ft with CGA.     Pt white board updated with current therapists name and level of mobility assistance needed.         AM-PAC 6 CLICK MOBILITY  Turning over in bed (including adjusting bedclothes, sheets and blankets)?: 3  Sitting down on and standing up from a chair with arms (e.g., wheelchair, bedside commode, etc.): 3  Moving from lying on back to sitting on the side of the bed?: 3  Moving to and from a bed to a chair (including a wheelchair)?: 3  Need to walk in hospital room?: 3  Climbing 3-5 steps with a railing?: 3  Basic Mobility Total Score: 18       Treatment & Education:  Pt received verbal instructions in PT POC and nodded "yes" that he understood.     Patient left up in chair with all lines intact and call button in reach..    GOALS:   Multidisciplinary Problems       Physical Therapy Goals          Problem: Physical Therapy    Goal Priority Disciplines Outcome Goal Variances Interventions   Physical Therapy Goal     PT, PT/OT Ongoing, Progressing     Description: Goals to be met by: 2022     Patient will increase functional independence with mobility by performin. Supine to sit with [place] -not met  2. Sit to supine with [place] -not met  3. Sit to stand transfer with Stand-by Assistance -not met  4. Bed to chair transfer with Stand-by Assistance using No Assistive Device -not met  5. Gait  x 100 feet with Stand-by Assistance using No Assistive Device. -not met           "               Time Tracking:     PT Received On: 11/15/22  PT Start Time: 1031     PT Stop Time: 1058  PT Total Time (min): 27 min     Billable Minutes: Gait Training 27 min    Treatment Type: Treatment  PT/PTA: PT     PTA Visit Number: 0     11/15/2022

## 2022-11-15 NOTE — NURSING
Per On call, she came to BS and did AX, ok with current venous pulse via internal doppler. Will cont to AX to manage POC.

## 2022-11-15 NOTE — PLAN OF CARE
Problem: Physical Therapy  Goal: Physical Therapy Goal  Description: Goals to be met by: 2022     Patient will increase functional independence with mobility by performin. Supine to sit with [place] -not met  2. Sit to supine with [place] -not met  3. Sit to stand transfer with Stand-by Assistance -not met  4. Bed to chair transfer with Stand-by Assistance using No Assistive Device -not met  5. Gait  x 100 feet with Stand-by Assistance using No Assistive Device. -not met    Outcome: Ongoing, Progressing   Goals remain appropriate. 11/15/2022

## 2022-11-15 NOTE — RESPIRATORY THERAPY
RT was called to the pt's bedside by the nurse. Pt had coughed out his trach. RT replaced with a new trach and inner cannula. Pt stated he felt better after coughing up a huge mucus plug.

## 2022-11-15 NOTE — NURSING
Page placed to team to make aware of lack of doppler sound from R venous flap site. Will cont to manage POC.

## 2022-11-15 NOTE — RESPIRATORY THERAPY
RAPID RESPONSE RESPIRATORY THERAPY PROACTIVE NOTE           Time of visit: 927     Code Status: Full Code   : 1950  Bed: 1052/1052 A:   MRN: [id number]  Time spent at the bedside: < 15 min    SITUATION    Evaluated patient for: LDA Check     BACKGROUND    Patient has a past medical history of Abnormal CT scan, neck, Allergy, Atrial fibrillation, Chronic anticoagulation, Diabetes mellitus, type 2, Hypertension, Larynx neoplasm malignant, Postoperative hypothyroidism, and Tongue cancer.  Clinically Significant Surgical Hx: laryngectomy    24 Hours Vitals Range:  Temp:  [97.1 °F (36.2 °C)-99.4 °F (37.4 °C)]   Pulse:  [76-98]   Resp:  [16-88]   BP: ()/(51-71)   SpO2:  [91 %-98 %]     Labs:    Recent Labs     22  0538 22  1540 22  0347 11/15/22  0610   *  --  133* 134*   K 4.3  --  4.1 3.8     --  102 101   CO2 20*  --  23 25   CREATININE 0.8  --  0.7 0.7   *  --  200* 190*   PHOS 3.8  --  3.8 3.3   MG 1.5* 1.6 1.8 1.7        No results for input(s): PH, PCO2, PO2, HCO3, POCSATURATED, BE in the last 72 hours.    ASSESSMENT/INTERVENTIONS  Pt resting comfortably with no respiratory needs at this time.      Last VS   Temp: 99.4 °F (37.4 °C) (11/15 1523)  Pulse: 95 (11/15 1523)  Resp: 20 (11/15 1523)  BP: 112/56 (11/15 1523)  SpO2: 97 % (11/15 1523)      Extra ETT at bedside: y  Level of Consciousness: Level of Consciousness (AVPU): alert  Respiratory Effort: Respiratory Effort: Normal, Unlabored Expansion/Accessory Muscle Usage: Expansion/Accessory Muscles/Retractions: no use of accessory muscles, no retractions, expansion symmetric  All Lung Field Breath Sounds: All Lung Fields Breath Sounds: coarse  O2 Device/Concentration: R.A.  Surgical airway: laryngectomy  Ambu at bedside: Ambu bag with the patient?: Yes, Adult Ambu     Active Orders   Respiratory Care    Oxygen Continuous     Frequency: Continuous     Number of Occurrences: Until Specified     Order Questions:       Device type: Low flow      Device: Trach Collar      FiO2%: 28      Titrate O2 per Oxygen Titration Protocol: Yes      To maintain SpO2 goal of: >= 90%      Notify MD of: Inability to achieve desired SpO2; Sudden change in patient status and requires 20% increase in FiO2; Patient requires >60% FiO2    Respiratory care evaluation only Daily     Frequency: Daily     Number of Occurrences: Until Specified    Routine tracheostomy care     Frequency: BID     Number of Occurrences: Until Specified    Stoma Care by RT Q4H     Frequency: Q4H     Number of Occurrences: Until Specified       RECOMMENDATIONS    We recommend: RRT Recs: Continue POC per primary team.      FOLLOW-UP    Please call back the Rapid Response RT, Antonio Roberts, RRT at x [phone] for any questions or concerns.

## 2022-11-15 NOTE — PROGRESS NOTES
[facility] - [facility]  Otorhinolaryngology-Head & Neck Surgery  Progress Note    Subjective:     Post-Op Info:  Procedure(s) (LRB):  TOTAL PHARYNGECTOMY  TOTAL GLOSSECTOMY (Bilateral)  CREATION, FREE FLAP, ALT (Left)  LARYNGOSCOPY, DIRECT  DISSECTION, NECK (Bilateral)  APPLICATION, GRAFT, SKIN, SPLIT-THICKNESS (Right)   6 Days Post-Op  Hospital Day: 7     Interval History: Had episode of trach dislodging following cough, found to have mucus plug. Stepped down yesterday.     Medications:  Continuous Infusions:  Scheduled Meds:   acetaminophen  999.0148 mg Per G Tube Q8H    calcitrioL  0.25 mcg Per G Tube Daily    calcium carbonate  1,000 mg Per G Tube TID WM    enoxaparin  40 mg Subcutaneous Daily    gabapentin  300 mg Per G Tube Q8H    levothyroxine  100 mcg Per G Tube Before breakfast    polyethylene glycol  17 g Per G Tube Daily    senna-docusate 8.6-50 mg  1 tablet Per G Tube BID    traZODone  50 mg Per G Tube QHS     PRN Meds:sodium chloride 0.9%, sodium chloride 0.9%, dextrose 10%, dextrose 10%, glucagon (human recombinant), ibuprofen, insulin aspart U-100, naloxone, oxyCODONE, prochlorperazine, sodium chloride 0.9%, sodium chloride 0.9%     Review of patient's allergies indicates:   Allergen Reactions    Pollen extracts     Lovastatin Rash     Not confirmed but pt skeptical     Objective:     Vital Signs (24h Range):  Temp:  [97.1 °F (36.2 °C)-99 °F (37.2 °C)] 98.1 °F (36.7 °C)  Pulse:  [] 81  Resp:  [11-28] 16  SpO2:  [91 %-96 %] 92 %  BP: ()/(51-71) 132/65       Lines/Drains/Airways       Central Venous Catheter Line  Duration             Port A Cath Single Lumen left subclavian -- days              Drain  Duration                  Open Drain Anterior;Left Neck Penrose -- days         Open Drain Right;Anterior Neck Penrose -- days         Gastrostomy/Enterostomy 06/09/22 1935 midline feeding 158 days         Closed/Suction Drain 11/09/22 1518 Left;Anterior Thigh Bulb 19 Fr. 5 days          Urethral Catheter 11/11/22 1530 Straight-tip 16 Fr. 3 days              Airway  Duration                  Surgical Airway Shiley Cuffed -- days         Laryngectomy (Do Not Remove) 06/09/22 1414 Laryngectomy stoma 158 days         Airway - Non-Surgical 11/09/22 0856 Coil Wire Tube 5 days              Peripheral Intravenous Line  Duration                  Peripheral IV - Single Lumen 11/13/22 0937 20 G Anterior;Right Forearm 1 day         Peripheral IV - Single Lumen 11/13/22 1617 18 G Anterior;Left;Proximal Forearm 1 day                    Physical Exam  Constitutional:       General: He is not in acute distress.  HENT:      Head:      Comments: Diffuse facial edema bilaterally but significantly improved from prior exam, soft to palpation     Mouth/Throat:      Comments: Flap soft to palpation, sutures to FOM intact   Eyes:      Extraocular Movements: Extraocular movements intact.   Neck:      Comments: Trach in place to stoma   Skin graft to midline neck overlying flap, appropriate drainage  Staples c/d/I on lateral neck, penroses in place bilaterally   Doppler with good venous and arterial signals   Cardiovascular:      Rate and Rhythm: Normal rate and regular rhythm.   Pulmonary:      Effort: No respiratory distress.   Neurological:      Mental Status: He is alert.     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 Now s/p total glossectomy, pharyngectomy, and ALT free flap and STSG reconstruction on  "11/9.        ENT/HNS:  - Q4H flap checks   - No neck ties   - Keep tracheostomy in place w/ cuff inflated   - OK to replace tracheostomy if dislodged, only in place for swelling/secretion assistance   - SLP evaluation  - Bacitracin ointemnt to incision sites   -Sign above bed + outside door stating patient is "neck breather" and "can not be intubated by mouth"  -Clean laryngectomy stoma as needed, or daily  -Fresh wound care, clean incision with peroxide/qtip followed by bacitracin BID  -Humidified O2 via trach collar     Neuro:   - Q4H neuro checks   - Monitor urinary output   - Pain: Multimodality PRN regimen initiated     Cardiac:  - HDS  - Page ENT before starting vasopressors  - H/O of paroxysmal a fib, will CTM      Pulmonary:   - PRN breathing treatments  - Humidified air per trach collar  - OK to remove tracheostomy for adequate suctioning      Renal:   - monitor Is and Os  - Cr stable  - [place]arez replaced 11/11     Infectious Disease:  - Daily CBC  - Unasyn completed     Hematology/Oncology:  - H/H stable  - Lovenox DVT prophylaxis      FEN/GI  - Postoperative hypoparathyroidism, iCal stable    - Tums QID, calcitriol    - CTM    - Replace electrolytes as needed to keep K>4, Mg>2  - Bowel reg  - Protonix for GI prophylaxis  - Nausea control ondansetron, phenergan  - Continue TFs   - Continue maintenance IVF      MSK  - Out of bed as tolerated  - Drain care  - [place] dressing removed, bacitracin ointment to incision site   - STSG donor site healed      Dispo:  -OK for stepdown           Jo Ann Bryant MD  Otorhinolaryngology-Head & Neck Surgery  [facility]  "

## 2022-11-15 NOTE — PLAN OF CARE
"Problem: Infection  Goal: Absence of Infection Signs and Symptoms  Outcome: Ongoing, Progressing     Problem: Adult Inpatient Plan of Care  Goal: Plan of Care Review  Outcome: Ongoing, Progressing  Goal: Patient-Specific Goal (Individualized)  Outcome: Ongoing, Progressing  Goal: Absence of Hospital-Acquired Illness or Injury  Outcome: Ongoing, Progressing  Goal: Optimal Comfort and Wellbeing  Outcome: Ongoing, Progressing  Goal: Readiness for Transition of Care  Outcome: Ongoing, Progressing     Problem: Diabetes Comorbidity  Goal: Blood Glucose Level Within Targeted Range  Outcome: Ongoing, Progressing     Problem: Laryngectomy  Goal: POD 0 - Bed rest  Outcome: Ongoing, Progressing  Goal: POD 0 - Wean mechanical vent to off  Outcome: Ongoing, Progressing  Goal: POD 1 - OOB to chair  Outcome: Ongoing, Progressing  Goal: POD 1 - Knee immobilizer for skin grafted ALT donor  Outcome: Ongoing, Progressing  Goal: POD 2 - OOB to chair TID  Outcome: Ongoing, Progressing  Goal: POD 3 & 4 - Ambulate with assist/OOB TID  Outcome: Ongoing, Progressing  Goal: POD 7 & 8 - Start "rooming in." Have family provide all care  Outcome: Ongoing, Progressing  Goal: POD 4 - Full weight bearing if ALT (no graft)  Outcome: Ongoing, Progressing  Goal: POD 5 to 10 - Ambulate TID  Outcome: Ongoing, Progressing  Goal: POD 8 - DME complete  Outcome: Ongoing, Progressing     Problem: Skin Injury Risk Increased  Goal: Skin Health and Integrity  Outcome: Ongoing, Progressing     Problem: Fall Injury Risk  Goal: Absence of Fall and Fall-Related Injury  Outcome: Ongoing, Progressing     "

## 2022-11-15 NOTE — PLAN OF CARE
"POC reviewed with PT, stated understanding, AXO4, VSS.  Venous pulse not found via internal doppler, team aware.   ISAIAS with TRACH WDL, PT educated to catch ISAIAS tubing if coughing.   TF at rate per order, PT tolerating with no C/O N/V.   IX and drains WDL.  Pain managed with meds per MAR, with relief noted.  UP to chair with standby assist back to bed.  Skin status unchanged this shift.   NO adverse events this shift, bed low, call light in reach, will cont to manage POC.     Problem: Infection  Goal: Absence of Infection Signs and Symptoms  Outcome: Ongoing, Progressing     Problem: Adult Inpatient Plan of Care  Goal: Plan of Care Review  Outcome: Ongoing, Progressing  Goal: Patient-Specific Goal (Individualized)  Outcome: Ongoing, Progressing  Goal: Absence of Hospital-Acquired Illness or Injury  Outcome: Ongoing, Progressing  Goal: Optimal Comfort and Wellbeing  Outcome: Ongoing, Progressing  Goal: Readiness for Transition of Care  Outcome: Ongoing, Progressing     Problem: Diabetes Comorbidity  Goal: Blood Glucose Level Within Targeted Range  Outcome: Ongoing, Progressing     Problem: Laryngectomy  Goal: POD 0 - Bed rest  Outcome: Ongoing, Progressing  Goal: POD 0 - Wean mechanical vent to off  Outcome: Ongoing, Progressing  Goal: POD 1 - OOB to chair  Outcome: Ongoing, Progressing  Goal: POD 1 - Knee immobilizer for skin grafted ALT donor  Outcome: Ongoing, Progressing  Goal: POD 2 - OOB to chair TID  Outcome: Ongoing, Progressing  Goal: POD 3 & 4 - Ambulate with assist/OOB TID  Outcome: Ongoing, Progressing  Goal: POD 7 & 8 - Start "rooming in." Have family provide all care  Outcome: Ongoing, Progressing  Goal: POD 4 - Full weight bearing if ALT (no graft)  Outcome: Ongoing, Progressing  Goal: POD 5 to 10 - Ambulate TID  Outcome: Ongoing, Progressing  Goal: POD 8 - DME complete  Outcome: Ongoing, Progressing     Problem: Skin Injury Risk Increased  Goal: Skin Health and Integrity  Outcome: Ongoing, " Progressing     Problem: Fall Injury Risk  Goal: Absence of Fall and Fall-Related Injury  Outcome: Ongoing, Progressing

## 2022-11-15 NOTE — NURSING
"Team aware of lack of venous pulse with doppler this AM at my AX. Per on call, "will come see". Will cont to manage POC.   "

## 2022-11-15 NOTE — ASSESSMENT & PLAN NOTE
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 Now s/p total glossectomy, pharyngectomy, and ALT free flap and STSG reconstruction on 11/9.        ENT/HNS:  - Q4H flap checks   - No neck ties   - Keep tracheostomy in place w/ cuff inflated   - OK to replace tracheostomy if dislodged, only in place for swelling/secretion assistance   - SLP evaluation  - Bacitracin ointemnt to incision sites   -Sign above bed + outside door stating patient is "neck breather" and "can not be intubated by mouth"  -Clean laryngectomy stoma as needed, or daily  -Fresh wound care, clean incision with peroxide/qtip followed by bacitracin BID  -Humidified O2 via trach collar     Neuro:   - Q4H neuro checks   - Monitor urinary output   - Pain: Multimodality PRN regimen initiated     Cardiac:  - HDS  - Page ENT before starting vasopressors  - H/O of paroxysmal a fib, will CTM      Pulmonary:   - PRN breathing treatments  - Humidified air per trach collar  - OK to remove tracheostomy for adequate suctioning      Renal:   - monitor Is and Os  - Cr stable  - Juarez replaced 11/11     Infectious Disease:  - Daily CBC  - Unasyn completed     Hematology/Oncology:  - H/H stable  - Lovenox DVT prophylaxis      FEN/GI  - Postoperative hypoparathyroidism, iCal stable    - Tums QID, calcitriol    - CTM    - Replace electrolytes as needed to keep K>4, Mg>2  - Bowel reg  - Protonix for GI prophylaxis  - Nausea control ondansetron, phenergan  - Continue TFs   - Continue maintenance IVF      MSK  - Out of bed as tolerated  - Drain care  - Island dressing removed, bacitracin ointment to incision site   - STSG donor site healed      Dispo:  -OK for stepdown     "

## 2022-11-16 PROBLEM — E44.1 MALNUTRITION OF MILD DEGREE: Status: ACTIVE | Noted: 2022-11-16

## 2022-11-16 PROBLEM — Z93.0 TRACHEOSTOMY IN PLACE: Status: ACTIVE | Noted: 2022-11-16

## 2022-11-16 LAB
ALBUMIN SERPL BCP-MCNC: 2.3 G/DL (ref 3.5–5.2)
ALP SERPL-CCNC: 59 U/L (ref 55–135)
ALT SERPL W/O P-5'-P-CCNC: 8 U/L (ref 10–44)
ANION GAP SERPL CALC-SCNC: 11 MMOL/L (ref 8–16)
AST SERPL-CCNC: 11 U/L (ref 10–40)
BASOPHILS # BLD AUTO: 0.02 K/UL (ref 0–0.2)
BASOPHILS # BLD AUTO: 0.02 K/UL (ref 0–0.2)
BASOPHILS NFR BLD: 0.3 % (ref 0–1.9)
BASOPHILS NFR BLD: 0.3 % (ref 0–1.9)
BILIRUB SERPL-MCNC: 0.5 MG/DL (ref 0.1–1)
BUN SERPL-MCNC: 24 MG/DL (ref 8–23)
CA-I BLDV-SCNC: 1.06 MMOL/L (ref 1.06–1.42)
CALCIUM SERPL-MCNC: 8.2 MG/DL (ref 8.7–10.5)
CHLORIDE SERPL-SCNC: 105 MMOL/L (ref 95–110)
CO2 SERPL-SCNC: 20 MMOL/L (ref 23–29)
CREAT SERPL-MCNC: 0.7 MG/DL (ref 0.5–1.4)
DIFFERENTIAL METHOD: ABNORMAL
DIFFERENTIAL METHOD: ABNORMAL
EOSINOPHIL # BLD AUTO: 0.2 K/UL (ref 0–0.5)
EOSINOPHIL # BLD AUTO: 0.2 K/UL (ref 0–0.5)
EOSINOPHIL NFR BLD: 2.8 % (ref 0–8)
EOSINOPHIL NFR BLD: 2.8 % (ref 0–8)
ERYTHROCYTE [DISTWIDTH] IN BLOOD BY AUTOMATED COUNT: 13 % (ref 11.5–14.5)
ERYTHROCYTE [DISTWIDTH] IN BLOOD BY AUTOMATED COUNT: 13 % (ref 11.5–14.5)
EST. GFR  (NO RACE VARIABLE): >60 ML/MIN/1.73 M^2
GLUCOSE SERPL-MCNC: 137 MG/DL (ref 70–110)
HCT VFR BLD AUTO: 23.8 % (ref 40–54)
HCT VFR BLD AUTO: 23.8 % (ref 40–54)
HGB BLD-MCNC: 7.6 G/DL (ref 14–18)
HGB BLD-MCNC: 7.6 G/DL (ref 14–18)
IMM GRANULOCYTES # BLD AUTO: 0.18 K/UL (ref 0–0.04)
IMM GRANULOCYTES # BLD AUTO: 0.18 K/UL (ref 0–0.04)
IMM GRANULOCYTES NFR BLD AUTO: 2.5 % (ref 0–0.5)
IMM GRANULOCYTES NFR BLD AUTO: 2.5 % (ref 0–0.5)
LYMPHOCYTES # BLD AUTO: 0.5 K/UL (ref 1–4.8)
LYMPHOCYTES # BLD AUTO: 0.5 K/UL (ref 1–4.8)
LYMPHOCYTES NFR BLD: 7.1 % (ref 18–48)
LYMPHOCYTES NFR BLD: 7.1 % (ref 18–48)
MAGNESIUM SERPL-MCNC: 1.6 MG/DL (ref 1.6–2.6)
MCH RBC QN AUTO: 30.3 PG (ref 27–31)
MCH RBC QN AUTO: 30.3 PG (ref 27–31)
MCHC RBC AUTO-ENTMCNC: 31.9 G/DL (ref 32–36)
MCHC RBC AUTO-ENTMCNC: 31.9 G/DL (ref 32–36)
MCV RBC AUTO: 95 FL (ref 82–98)
MCV RBC AUTO: 95 FL (ref 82–98)
MONOCYTES # BLD AUTO: 0.5 K/UL (ref 0.3–1)
MONOCYTES # BLD AUTO: 0.5 K/UL (ref 0.3–1)
MONOCYTES NFR BLD: 7 % (ref 4–15)
MONOCYTES NFR BLD: 7 % (ref 4–15)
NEUTROPHILS # BLD AUTO: 5.8 K/UL (ref 1.8–7.7)
NEUTROPHILS # BLD AUTO: 5.8 K/UL (ref 1.8–7.7)
NEUTROPHILS NFR BLD: 80.3 % (ref 38–73)
NEUTROPHILS NFR BLD: 80.3 % (ref 38–73)
NRBC BLD-RTO: 0 /100 WBC
NRBC BLD-RTO: 0 /100 WBC
PHOSPHATE SERPL-MCNC: 3.6 MG/DL (ref 2.7–4.5)
PLATELET # BLD AUTO: 261 K/UL (ref 150–450)
PLATELET # BLD AUTO: 261 K/UL (ref 150–450)
PMV BLD AUTO: 10.3 FL (ref 9.2–12.9)
PMV BLD AUTO: 10.3 FL (ref 9.2–12.9)
POTASSIUM SERPL-SCNC: 3.7 MMOL/L (ref 3.5–5.1)
PROT SERPL-MCNC: 5.8 G/DL (ref 6–8.4)
RBC # BLD AUTO: 2.51 M/UL (ref 4.6–6.2)
RBC # BLD AUTO: 2.51 M/UL (ref 4.6–6.2)
SODIUM SERPL-SCNC: 136 MMOL/L (ref 136–145)
WBC # BLD AUTO: 7.19 K/UL (ref 3.9–12.7)
WBC # BLD AUTO: 7.19 K/UL (ref 3.9–12.7)

## 2022-11-16 PROCEDURE — 84100 ASSAY OF PHOSPHORUS: CPT | Performed by: OTOLARYNGOLOGY

## 2022-11-16 PROCEDURE — 36415 COLL VENOUS BLD VENIPUNCTURE: CPT | Performed by: STUDENT IN AN ORGANIZED HEALTH CARE EDUCATION/TRAINING PROGRAM

## 2022-11-16 PROCEDURE — 83735 ASSAY OF MAGNESIUM: CPT | Performed by: OTOLARYNGOLOGY

## 2022-11-16 PROCEDURE — 63600175 PHARM REV CODE 636 W HCPCS: Performed by: STUDENT IN AN ORGANIZED HEALTH CARE EDUCATION/TRAINING PROGRAM

## 2022-11-16 PROCEDURE — 85025 COMPLETE CBC W/AUTO DIFF WBC: CPT | Performed by: STUDENT IN AN ORGANIZED HEALTH CARE EDUCATION/TRAINING PROGRAM

## 2022-11-16 PROCEDURE — 82330 ASSAY OF CALCIUM: CPT | Performed by: STUDENT IN AN ORGANIZED HEALTH CARE EDUCATION/TRAINING PROGRAM

## 2022-11-16 PROCEDURE — 25000003 PHARM REV CODE 250: Performed by: STUDENT IN AN ORGANIZED HEALTH CARE EDUCATION/TRAINING PROGRAM

## 2022-11-16 PROCEDURE — 80053 COMPREHEN METABOLIC PANEL: CPT | Performed by: STUDENT IN AN ORGANIZED HEALTH CARE EDUCATION/TRAINING PROGRAM

## 2022-11-16 PROCEDURE — 97535 SELF CARE MNGMENT TRAINING: CPT

## 2022-11-16 PROCEDURE — 99900022

## 2022-11-16 PROCEDURE — 20600001 HC STEP DOWN PRIVATE ROOM

## 2022-11-16 PROCEDURE — 99900026 HC AIRWAY MAINTENANCE (STAT)

## 2022-11-16 PROCEDURE — 25000003 PHARM REV CODE 250

## 2022-11-16 PROCEDURE — 94761 N-INVAS EAR/PLS OXIMETRY MLT: CPT

## 2022-11-16 PROCEDURE — 99900035 HC TECH TIME PER 15 MIN (STAT)

## 2022-11-16 PROCEDURE — 63700000 PHARM REV CODE 250 ALT 637 W/O HCPCS

## 2022-11-16 RX ADMIN — LEVOTHYROXINE SODIUM 100 MCG: 100 TABLET ORAL at 05:11

## 2022-11-16 RX ADMIN — GABAPENTIN 300 MG: 250 SOLUTION ORAL at 09:11

## 2022-11-16 RX ADMIN — SENNOSIDES AND DOCUSATE SODIUM 1 TABLET: 50; 8.6 TABLET ORAL at 09:11

## 2022-11-16 RX ADMIN — ACETAMINOPHEN 999.01 MG: 650 SOLUTION ORAL at 05:11

## 2022-11-16 RX ADMIN — CALCIUM CARBONATE 1000 MG: 1250 SUSPENSION ORAL at 05:11

## 2022-11-16 RX ADMIN — TRAZODONE HYDROCHLORIDE 50 MG: 50 TABLET ORAL at 09:11

## 2022-11-16 RX ADMIN — ACETAMINOPHEN 999.01 MG: 650 SOLUTION ORAL at 02:11

## 2022-11-16 RX ADMIN — ACETAMINOPHEN 999.01 MG: 650 SOLUTION ORAL at 09:11

## 2022-11-16 RX ADMIN — CALCITRIOL 0.25 MCG: 1 SOLUTION ORAL at 10:11

## 2022-11-16 RX ADMIN — ENOXAPARIN SODIUM 40 MG: 100 INJECTION SUBCUTANEOUS at 05:11

## 2022-11-16 RX ADMIN — CALCIUM CARBONATE 1000 MG: 1250 SUSPENSION ORAL at 09:11

## 2022-11-16 RX ADMIN — CALCIUM CARBONATE 1000 MG: 1250 SUSPENSION ORAL at 12:11

## 2022-11-16 RX ADMIN — GABAPENTIN 300 MG: 250 SOLUTION ORAL at 02:11

## 2022-11-16 RX ADMIN — GABAPENTIN 300 MG: 250 SOLUTION ORAL at 05:11

## 2022-11-16 NOTE — ASSESSMENT & PLAN NOTE
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 Now s/p total glossectomy, pharyngectomy, and ALT free flap and STSG reconstruction on 11/9.        ENT/HNS:  - Q4H flap checks    - Keep tracheostomy in place w/ cuff inflated   - OK to replace tracheostomy if dislodged, only in place for swelling/secretion assistance    - Will discuss with staff if OK for neck ties   - SLP evaluation  - Bacitracin ointemnt to incision sites   -Sign above bed + outside door stating patient is "neck breather" and "can not be intubated by mouth"  -Clean laryngectomy stoma as needed, or daily  -Fresh wound care, clean incision with peroxide/qtip followed by bacitracin BID  -Humidified O2 via trach collar     Neuro:   - Pain: Multimodality PRN regimen initiated     Cardiac:  - HDS  - Page ENT before starting vasopressors  - H/O of paroxysmal a fib, will CTM      Pulmonary:   - PRN breathing treatments  - Humidified air per trach collar  - OK to remove tracheostomy for adequate suctioning      Renal:   - monitor Is and Os  - Cr stable  - Mahan replaced 11/11   - Will discontinue mahan today for voiding trial      Infectious Disease:  - Daily CBC  - Unasyn completed     Hematology/Oncology:  - H/H stable  - Lovenox DVT prophylaxis      FEN/GI  - Postoperative hypoparathyroidism, iCal stable    - Tums QID, calcitriol   - Replace electrolytes as needed to keep K>4, Mg>2  - Bowel reg  - Protonix for GI prophylaxis  - Nausea control ondansetron, phenergan  - Continue TFs    - Will discuss transitioning to bolus      MSK  - Out of bed as tolerated  - Drain care  - Island dressing removed, bacitracin ointment to incision site   - STSG donor site healed      Dispo:  - Pending voiding trial and toleration of bolus feeds. Patient has supplies at home from prior surgery    "

## 2022-11-16 NOTE — PROGRESS NOTES
[facility] - [facility]  Otorhinolaryngology-Head & Neck Surgery  Progress Note    Subjective:     Post-Op Info:  Procedure(s) (LRB):  TOTAL PHARYNGECTOMY  TOTAL GLOSSECTOMY (Bilateral)  CREATION, FREE FLAP, ALT (Left)  LARYNGOSCOPY, DIRECT  DISSECTION, NECK (Bilateral)  APPLICATION, GRAFT, SKIN, SPLIT-THICKNESS (Right)   7 Days Post-Op  Hospital Day: 8     Interval History: NAEON. Patient frustrated with frequent trach dislodging.     Medications:  Continuous Infusions:  Scheduled Meds:   acetaminophen  999.0148 mg Per G Tube Q8H    calcitrioL  0.25 mcg Per G Tube Daily    calcium carbonate  1,000 mg Per G Tube TID WM    enoxaparin  40 mg Subcutaneous Daily    gabapentin  300 mg Per G Tube Q8H    levothyroxine  100 mcg Per G Tube Before breakfast    polyethylene glycol  17 g Per G Tube Daily    senna-docusate 8.6-50 mg  1 tablet Per G Tube BID    traZODone  50 mg Per G Tube QHS     PRN Meds:sodium chloride 0.9%, sodium chloride 0.9%, dextrose 10%, dextrose 10%, glucagon (human recombinant), ibuprofen, insulin aspart U-100, naloxone, oxyCODONE, prochlorperazine, sodium chloride 0.9%, sodium chloride 0.9%     Review of patient's allergies indicates:   Allergen Reactions    Pollen extracts     Lovastatin Rash     Not confirmed but pt skeptical     Objective:     Vital Signs (24h Range):  Temp:  [97.3 °F (36.3 °C)-99.4 °F (37.4 °C)] 97.3 °F (36.3 °C)  Pulse:  [] 84  Resp:  [16-20] 17  SpO2:  [96 %-98 %] 98 %  BP: (109-129)/(56-75) 109/62       Lines/Drains/Airways       Central Venous Catheter Line  Duration             Port A Cath Single Lumen left subclavian -- days              Drain  Duration                  Open Drain Anterior;Left Neck Penrose -- days         Open Drain Right;Anterior Neck Penrose -- days         Gastrostomy/Enterostomy 06/09/22 1935 midline feeding 159 days         Closed/Suction Drain 11/09/22 1518 Left;Anterior Thigh Bulb 19 Fr. 6 days         Urethral Catheter 11/11/22 8310  Straight-tip 16 Fr. 4 days              Airway  Duration                  Surgical Airway Shiley Cuffed -- days         Laryngectomy (Do Not Remove) 06/09/22 1414 Laryngectomy stoma 159 days         Airway - Non-Surgical 11/09/22 0856 Coil Wire Tube 6 days              Peripheral Intravenous Line  Duration                  Peripheral IV - Single Lumen 11/13/22 0937 20 G Anterior;Right Forearm 2 days         Peripheral IV - Single Lumen 11/13/22 1617 18 G Anterior;Left;Proximal Forearm 2 days                    Physical Exam  Constitutional:       General: He is not in acute distress.  HENT:      Head:      Comments: Diffuse facial edema bilaterally but stable, soft to palpation     Mouth/Throat:      Comments: Flap soft to palpation, sutures to FOM intact   Eyes:      Extraocular Movements: Extraocular movements intact.   Neck:      Comments: Trach in place to stoma   Skin graft to midline neck overlying flap, dry  Staples c/d/I on lateral neck, penroses in place bilaterally   Doppler with good venous and arterial signals   Cardiovascular:      Rate and Rhythm: Normal rate and regular rhythm.   Pulmonary:      Effort: No respiratory distress.   Neurological:      Mental Status: He is alert.     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 Now s/p total glossectomy, pharyngectomy, and ALT free flap and STSG reconstruction on 11/9.        ENT/HNS:  -  "Q4H flap checks    - Keep tracheostomy in place w/ cuff inflated   - OK to replace tracheostomy if dislodged, only in place for swelling/secretion assistance    - Will discuss with staff if OK for neck ties   - SLP evaluation  - Bacitracin ointemnt to incision sites   -Sign above bed + outside door stating patient is "neck breather" and "can not be intubated by mouth"  -Clean laryngectomy stoma as needed, or daily  -Fresh wound care, clean incision with peroxide/qtip followed by bacitracin BID  -Humidified O2 via trach collar     Neuro:   - Pain: Multimodality PRN regimen initiated     Cardiac:  - HDS  - Page ENT before starting vasopressors  - H/O of paroxysmal a fib, will CTM      Pulmonary:   - PRN breathing treatments  - Humidified air per trach collar  - OK to remove tracheostomy for adequate suctioning      Renal:   - monitor Is and Os  - Cr stable  - Mahan replaced 11/11   - Will discontinue mahan today for voiding trial      Infectious Disease:  - Daily CBC  - Unasyn completed     Hematology/Oncology:  - H/H stable  - Lovenox DVT prophylaxis      FEN/GI  - Postoperative hypoparathyroidism, iCal stable    - Tums QID, calcitriol   - Replace electrolytes as needed to keep K>4, Mg>2  - Bowel reg  - Protonix for GI prophylaxis  - Nausea control ondansetron, phenergan  - Continue TFs    - Will discuss transitioning to bolus      MSK  - Out of bed as tolerated  - Drain care  - Island dressing removed, bacitracin ointment to incision site   - STSG donor site healed      Dispo:  - Pending voiding trial and toleration of bolus feeds. Patient has supplies at home from prior surgery          Jo Ann Bryant MD  Otorhinolaryngology-Head & Neck Surgery  [facility]  "

## 2022-11-16 NOTE — RESPIRATORY THERAPY
RAPID RESPONSE RESPIRATORY THERAPY PROACTIVE NOTE           Time of visit: 0837     Code Status: Full Code   : 1950  Bed: 1052/1052 A:   MRN: [id number]  Time spent at the bedside: < 15 min    SITUATION    Evaluated patient for: LDA Check     BACKGROUND    Patient has a past medical history of Abnormal CT scan, neck, Allergy, Atrial fibrillation, Chronic anticoagulation, Diabetes mellitus, type 2, Hypertension, Larynx neoplasm malignant, Postoperative hypothyroidism, and Tongue cancer.  Clinically Significant Surgical Hx: laryngectomy    24 Hours Vitals Range:  Temp:  [97.3 °F (36.3 °C)-99.4 °F (37.4 °C)]   Pulse:  []   Resp:  [16-20]   BP: (109-129)/(56-75)   SpO2:  [97 %-98 %]     Labs:    Recent Labs     22  0347 11/15/22  0610 22  0430   * 134* 136   K 4.1 3.8 3.7    101 105   CO2 23 25 20*   CREATININE 0.7 0.7 0.7   * 190* 137*   PHOS 3.8 3.3 3.6   MG 1.8 1.7 1.6        No results for input(s): PH, PCO2, PO2, HCO3, POCSATURATED, BE in the last 72 hours.    ASSESSMENT/INTERVENTIONS  Patient resting comfortably. Neck breather signs were placed at the door and HOB. All safety supplies visible at bedside. No respiratory concerns at this time.      Last VS   Temp: 97.3 °F (36.3 °C) ( 075)  Pulse: 84 ( 075)  Resp: 17 ( 075)  BP: 109/62 ( 0752)  SpO2: 97 % ( 0901)      Extra trachs at bedside: 6, 7, 8 ETT's  Level of Consciousness: Level of Consciousness (AVPU): alert  Respiratory Effort: Respiratory Effort: Normal, Unlabored Expansion/Accessory Muscle Usage: Expansion/Accessory Muscles/Retractions: no use of accessory muscles, no retractions, expansion symmetric  All Lung Field Breath Sounds: All Lung Fields Breath Sounds: Anterior:, Lateral:, coarse  O2 Device/Concentration: Room Air  Surgical airway: Laryngectomy  Ambu at bedside: Ambu bag with the patient?: Yes, Adult Ambu     Active Orders   Respiratory Care    Oxygen Continuous     Frequency:  Continuous     Number of Occurrences: Until Specified     Order Questions:      Device type: Low flow      Device: Trach Collar      FiO2%: 28      Titrate O2 per Oxygen Titration Protocol: Yes      To maintain SpO2 goal of: >= 90%      Notify MD of: Inability to achieve desired SpO2; Sudden change in patient status and requires 20% increase in FiO2; Patient requires >60% FiO2    Respiratory care evaluation only Daily     Frequency: Daily     Number of Occurrences: Until Specified    Routine tracheostomy care     Frequency: BID     Number of Occurrences: Until Specified    Stoma Care by RT Q4H     Frequency: Q4H     Number of Occurrences: Until Specified       RECOMMENDATIONS    We recommend: RRT Recs: Continue POC per primary team.      FOLLOW-UP    Please call back the Rapid Response RTYumiko RRT at x [phone] for any questions or concerns.

## 2022-11-16 NOTE — SUBJECTIVE & OBJECTIVE
Interval History: NAEON. Patient frustrated with frequent trach dislodging.     Medications:  Continuous Infusions:  Scheduled Meds:   acetaminophen  999.0148 mg Per G Tube Q8H    calcitrioL  0.25 mcg Per G Tube Daily    calcium carbonate  1,000 mg Per G Tube TID WM    enoxaparin  40 mg Subcutaneous Daily    gabapentin  300 mg Per G Tube Q8H    levothyroxine  100 mcg Per G Tube Before breakfast    polyethylene glycol  17 g Per G Tube Daily    senna-docusate 8.6-50 mg  1 tablet Per G Tube BID    traZODone  50 mg Per G Tube QHS     PRN Meds:sodium chloride 0.9%, sodium chloride 0.9%, dextrose 10%, dextrose 10%, glucagon (human recombinant), ibuprofen, insulin aspart U-100, naloxone, oxyCODONE, prochlorperazine, sodium chloride 0.9%, sodium chloride 0.9%     Review of patient's allergies indicates:   Allergen Reactions    Pollen extracts     Lovastatin Rash     Not confirmed but pt skeptical     Objective:     Vital Signs (24h Range):  Temp:  [97.3 °F (36.3 °C)-99.4 °F (37.4 °C)] 97.3 °F (36.3 °C)  Pulse:  [] 84  Resp:  [16-20] 17  SpO2:  [96 %-98 %] 98 %  BP: (109-129)/(56-75) 109/62       Lines/Drains/Airways       Central Venous Catheter Line  Duration             Port A Cath Single Lumen left subclavian -- days              Drain  Duration                  Open Drain Anterior;Left Neck Penrose -- days         Open Drain Right;Anterior Neck Penrose -- days         Gastrostomy/Enterostomy 06/09/22 1935 midline feeding 159 days         Closed/Suction Drain 11/09/22 1518 Left;Anterior Thigh Bulb 19 Fr. 6 days         Urethral Catheter 11/11/22 1530 Straight-tip 16 Fr. 4 days              Airway  Duration                  Surgical Airway Shiley Cuffed -- days         Laryngectomy (Do Not Remove) 06/09/22 1414 Laryngectomy stoma 159 days         Airway - Non-Surgical 11/09/22 0856 Coil Wire Tube 6 days              Peripheral Intravenous Line  Duration                  Peripheral IV - Single Lumen 11/13/22 0937 20  G Anterior;Right Forearm 2 days         Peripheral IV - Single Lumen 11/13/22 1617 18 G Anterior;Left;Proximal Forearm 2 days                    Physical Exam  Constitutional:       General: He is not in acute distress.  HENT:      Head:      Comments: Diffuse facial edema bilaterally but stable, soft to palpation     Mouth/Throat:      Comments: Flap soft to palpation, sutures to FOM intact   Eyes:      Extraocular Movements: Extraocular movements intact.   Neck:      Comments: Trach in place to stoma   Skin graft to midline neck overlying flap, dry  Staples c/d/I on lateral neck, penroses in place bilaterally   Doppler with good venous and arterial signals   Cardiovascular:      Rate and Rhythm: Normal rate and regular rhythm.   Pulmonary:      Effort: No respiratory distress.   Neurological:      Mental Status: He is alert.     Significant Labs:  CBC:   Recent Labs   Lab 11/16/22  0430   WBC 7.19  7.19   RBC 2.51*  2.51*   HGB 7.6*  7.6*   HCT 23.8*  23.8*     261   MCV 95  95   MCH 30.3  30.3   MCHC 31.9*  31.9*     CMP:   Recent Labs   Lab 11/16/22  0430   *   CALCIUM 8.2*   ALBUMIN 2.3*   PROT 5.8*      K 3.7   CO2 20*      BUN 24*   CREATININE 0.7   ALKPHOS 59   ALT 8*   AST 11   BILITOT 0.5       Significant Diagnostics:  None

## 2022-11-16 NOTE — PT/OT/SLP PROGRESS
Occupational Therapy   Treatment    Name: Cyrus Batres Jr.  MRN: [id number]  Admitting Diagnosis:  Malignant neoplasm of base of tongue  7 Days Post-Op    Recommendations:     Discharge Recommendations: home health OT  Discharge Equipment Recommendations:   (TBD)  Barriers to discharge:  None    Assessment:     Cyrus Batres Jr. is a 72 y.o. male with a medical diagnosis of Malignant neoplasm of base of tongue.  Today pt. Particpated in hygiene in bathroom with SBA and ambulated in hallway. Performance deficits affecting function are impaired endurance, impaired functional mobility, impaired self care skills, decreased safety awareness, gait instability.     Rehab Prognosis:  Good; patient would benefit from acute skilled OT services to address these deficits and reach maximum level of function.       Plan:     Patient to be seen 3 x/week to address the above listed problems via self-care/home management, therapeutic activities, therapeutic exercises, neuromuscular re-education  Plan of Care Expires: 12/10/22  Plan of Care Reviewed with: patient    Subjective     Pain/Comfort:  Pain Rating 1: 4/10  Location - Side 1: Right  Location 1: leg  Pain Addressed 1: Reposition, Distraction    Objective:     Communicated with: RN prior to session.  Patient found  Seated on toilet  with Tracheostomy, GLENN drain, PEG Tube (hep lock IV, internal doppler neck) upon OT entry to room.    General Precautions: Standard, fall   Orthopedic Precautions:N/A   Braces:    Respiratory Status:  Cheli      Occupational Performance:     Bed Mobility:    Patient found Seated on toilet     Functional Mobility/Transfers:  Patient completed Sit <> Stand Transfer with stand by assistance  with  no assistive device   Patient completed Toilet Transfer Step Transfer technique with stand by assistance with  no AD  Functional Mobility: ambulated in [place] with No AD with SBA ~ 220 ft     Activities of Daily Living:  Grooming: supervision washing hand at  sink  Upper Body Dressing: minimum assistance malinda and doff gown seated   Toileting: stand by assistance pericare and clothing management       [facility] 6 Click ADL: 15    Treatment & Education:  Pt educated on role of occupational therapy, POC, and safety during ADLs and functional mobility. Pt and OT discussed importance of safe, continued mobility to optimize daily living skills. Pt verbalized understanding.   Pt completed the following during session:  White board updated during session. Pt given instruction to call for medical staff/nurse for assistance.       Patient left up in chair with all lines intact and call button in reach    GOALS:   Multidisciplinary Problems       Occupational Therapy Goals          Problem: Occupational Therapy    Goal Priority Disciplines Outcome Interventions   Occupational Therapy Goal     OT, PT/OT Ongoing, Progressing    Description: Goals to be met by: 11/24/22     Patient will increase functional independence with ADLs by performing:    UE Dressing with [place].  LE Dressing with Stand-by Assistance.  Grooming while standing at sink with Supervision.  Toileting from toilet with Supervision for hygiene and clothing management.   Toilet transfer to toilet with Supervision.                         Time Tracking:     OT Date of Treatment: 11/16/22  OT Start Time: 1254  OT Stop Time: 1323  OT Total Time (min): 29 min    Billable Minutes:Self Care/Home Management 29    OT/HENRY: OT     HENRY Visit Number: 0    11/16/2022

## 2022-11-16 NOTE — NURSING
"Pt viitals remain stable. Pt denies pain. No s/s of discomfort throughout the evening. Patient trach replaced and patient deep suctioned. Tolerated well. Tube feeding infusing. Patient making adequate urine via catheter collection. Chance drain charged at this time. Patient output 20 ml through  out shift.     0700: Report given to day RN.                     problem: Infection  Goal: Absence of Infection Signs and Symptoms  Outcome: Ongoing, Progressing     Problem: Adult Inpatient Plan of Care  Goal: Plan of Care Review  Outcome: Ongoing, Progressing  Goal: Patient-Specific Goal (Individualized)  Outcome: Ongoing, Progressing  Goal: Absence of Hospital-Acquired Illness or Injury  Outcome: Ongoing, Progressing  Goal: Optimal Comfort and Wellbeing  Outcome: Ongoing, Progressing  Goal: Readiness for Transition of Care  Outcome: Ongoing, Progressing     Problem: Diabetes Comorbidity  Goal: Blood Glucose Level Within Targeted Range  Outcome: Ongoing, Progressing     Problem: Laryngectomy  Goal: POD 0 - Bed rest  Outcome: Ongoing, Progressing  Goal: POD 0 - Wean mechanical vent to off  Outcome: Ongoing, Progressing  Goal: POD 1 - OOB to chair  Outcome: Ongoing, Progressing  Goal: POD 1 - Knee immobilizer for skin grafted ALT donor  Outcome: Ongoing, Progressing  Goal: POD 2 - OOB to chair TID  Outcome: Ongoing, Progressing  Goal: POD 3 & 4 - Ambulate with assist/OOB TID  Outcome: Ongoing, Progressing  Goal: POD 7 & 8 - Start "rooming in." Have family provide all care  Outcome: Ongoing, Progressing  Goal: POD 4 - Full weight bearing if ALT (no graft)  Outcome: Ongoing, Progressing  Goal: POD 5 to 10 - Ambulate TID  Outcome: Ongoing, Progressing  Goal: POD 8 - DME complete  Outcome: Ongoing, Progressing     Problem: Skin Injury Risk Increased  Goal: Skin Health and Integrity  Outcome: Ongoing, Progressing     Problem: Fall Injury Risk  Goal: Absence of Fall and Fall-Related Injury  Outcome: Ongoing, Progressing           "

## 2022-11-16 NOTE — PLAN OF CARE
Nael Carey - [facility]  Discharge Reassessment    Primary Care Provider: Maico Patterson MD    Expected Discharge Date: 11/17/2022    Reassessment (most recent)       Discharge Reassessment - 11/16/22 1034          Discharge Reassessment    Assessment Type Discharge Planning Reassessment     Did the patient's condition or plan change since previous assessment? No     Discharge Plan discussed with: Patient     Communicated CIHARA with patient/caregiver Yes     Discharge Plan A Home     Discharge Plan B Home with family     DME Needed Upon Discharge  other (see comments)   Unknown at this time    Discharge Barriers Identified None     Why the patient remains in the hospital Requires continued medical care        Post-Acute Status    Hospital Resources/Appts/Education Provided Provided patient/caregiver with written discharge plan information     Discharge Delays None known at this time                     Pt not ready for discharge due to: Not medically ready  SW will remain available for families in [facility].  Currently pt has d/c plans in progress at this time.      Pam Webber LCSW  Case Management/[facility]  [phone]

## 2022-11-17 LAB
POCT GLUCOSE: 189 MG/DL (ref 70–110)
POCT GLUCOSE: 192 MG/DL (ref 70–110)
POCT GLUCOSE: 272 MG/DL (ref 70–110)

## 2022-11-17 PROCEDURE — 25000003 PHARM REV CODE 250: Performed by: STUDENT IN AN ORGANIZED HEALTH CARE EDUCATION/TRAINING PROGRAM

## 2022-11-17 PROCEDURE — 97116 GAIT TRAINING THERAPY: CPT | Mod: CQ

## 2022-11-17 PROCEDURE — 97530 THERAPEUTIC ACTIVITIES: CPT | Mod: CQ

## 2022-11-17 PROCEDURE — 99900022

## 2022-11-17 PROCEDURE — 25000003 PHARM REV CODE 250

## 2022-11-17 PROCEDURE — 63600175 PHARM REV CODE 636 W HCPCS: Performed by: STUDENT IN AN ORGANIZED HEALTH CARE EDUCATION/TRAINING PROGRAM

## 2022-11-17 PROCEDURE — 63600175 PHARM REV CODE 636 W HCPCS: Performed by: OTOLARYNGOLOGY

## 2022-11-17 PROCEDURE — 99900026 HC AIRWAY MAINTENANCE (STAT)

## 2022-11-17 PROCEDURE — 94761 N-INVAS EAR/PLS OXIMETRY MLT: CPT

## 2022-11-17 PROCEDURE — 63700000 PHARM REV CODE 250 ALT 637 W/O HCPCS

## 2022-11-17 PROCEDURE — 99900035 HC TECH TIME PER 15 MIN (STAT)

## 2022-11-17 PROCEDURE — 20600001 HC STEP DOWN PRIVATE ROOM

## 2022-11-17 RX ADMIN — GABAPENTIN 300 MG: 250 SOLUTION ORAL at 05:11

## 2022-11-17 RX ADMIN — INSULIN ASPART 2 UNITS: 100 INJECTION, SOLUTION INTRAVENOUS; SUBCUTANEOUS at 07:11

## 2022-11-17 RX ADMIN — CALCIUM CARBONATE 1000 MG: 1250 SUSPENSION ORAL at 10:11

## 2022-11-17 RX ADMIN — SENNOSIDES AND DOCUSATE SODIUM 1 TABLET: 50; 8.6 TABLET ORAL at 09:11

## 2022-11-17 RX ADMIN — GABAPENTIN 300 MG: 250 SOLUTION ORAL at 09:11

## 2022-11-17 RX ADMIN — INSULIN ASPART 3 UNITS: 100 INJECTION, SOLUTION INTRAVENOUS; SUBCUTANEOUS at 06:11

## 2022-11-17 RX ADMIN — LEVOTHYROXINE SODIUM 100 MCG: 100 TABLET ORAL at 05:11

## 2022-11-17 RX ADMIN — GABAPENTIN 300 MG: 250 SOLUTION ORAL at 01:11

## 2022-11-17 RX ADMIN — CALCITRIOL 0.25 MCG: 1 SOLUTION ORAL at 10:11

## 2022-11-17 RX ADMIN — CALCIUM CARBONATE 1000 MG: 1250 SUSPENSION ORAL at 05:11

## 2022-11-17 RX ADMIN — ENOXAPARIN SODIUM 40 MG: 100 INJECTION SUBCUTANEOUS at 05:11

## 2022-11-17 RX ADMIN — TRAZODONE HYDROCHLORIDE 50 MG: 50 TABLET ORAL at 09:11

## 2022-11-17 RX ADMIN — ACETAMINOPHEN 999.01 MG: 650 SOLUTION ORAL at 05:11

## 2022-11-17 NOTE — RESPIRATORY THERAPY
RAPID RESPONSE RESPIRATORY THERAPY PROACTIVE NOTE           Time of visit: 833     Code Status: Full Code   : 1950  Bed: 1052/1052 A:   MRN: [id number]  Time spent at the bedside: < 15 min    SITUATION    Evaluated patient for: LDA Check     BACKGROUND    Patient has a past medical history of Abnormal CT scan, neck, Allergy, Atrial fibrillation, Chronic anticoagulation, Diabetes mellitus, type 2, Hypertension, Larynx neoplasm malignant, Postoperative hypothyroidism, and Tongue cancer.  Clinically Significant Surgical Hx: tracheostomy    24 Hours Vitals Range:  Temp:  [96.1 °F (35.6 °C)-98.2 °F (36.8 °C)]   Pulse:  [79-99]   Resp:  [16-18]   BP: (100-107)/(56-71)   SpO2:  [92 %-99 %]     Labs:    Recent Labs     11/15/22  0610 22  0430   * 136   K 3.8 3.7    105   CO2 25 20*   CREATININE 0.7 0.7   * 137*   PHOS 3.3 3.6   MG 1.7 1.6        No results for input(s): PH, PCO2, PO2, HCO3, POCSATURATED, BE in the last 72 hours.    ASSESSMENT/INTERVENTIONS  Laryngectomy pt. Pt on RA. Shiley size 6 uncuffed trach in place in stoma. All supplies in room and signage in place. Extra cuffed trachs sizes 4 and 6 at bedside. ET tubes sizes 6, 7, and 8 at bedside.      Last VS   Temp: 97.4 °F (36.3 °C) ( 0843)  Pulse: 99 ( 1203)  Resp: 17 ( 1203)  BP: 100/58 ( 0843)  SpO2: 99 % ( 1203)      Extra trachs at bedside: 4 cuffed and 6 uncuffed.  Level of Consciousness: Level of Consciousness (AVPU): alert  Respiratory Effort: Respiratory Effort: Normal, Unlabored Expansion/Accessory Muscle Usage: Expansion/Accessory Muscles/Retractions: expansion symmetric, no retractions, no use of accessory muscles  All Lung Field Breath Sounds: All Lung Fields Breath Sounds: Anterior:, Lateral:, coarse, diminished  O2 Device/Concentration: RA 21%.  Surgical airway: Yes, Type: Shiley Size: 6, cuffed  Ambu at bedside: Ambu bag with the patient?: Yes, Adult Ambu     Active Orders   Respiratory  Care    Oxygen Continuous     Frequency: Continuous     Number of Occurrences: Until Specified     Order Questions:      Device type: Low flow      Device: Trach Collar      FiO2%: 28      Titrate O2 per Oxygen Titration Protocol: Yes      To maintain SpO2 goal of: >= 90%      Notify MD of: Inability to achieve desired SpO2; Sudden change in patient status and requires 20% increase in FiO2; Patient requires >60% FiO2    Respiratory care evaluation only Daily     Frequency: Daily     Number of Occurrences: Until Specified    Routine tracheostomy care     Frequency: BID     Number of Occurrences: Until Specified    Stoma Care by RT Q4H     Frequency: Q4H     Number of Occurrences: Until Specified       RECOMMENDATIONS    We recommend: RRT Recs: Continue POC per primary team.      FOLLOW-UP    Please call back the Rapid Response RTTacho RRT at x [phone] for any questions or concerns.

## 2022-11-17 NOTE — PLAN OF CARE
Notified patient will need Tube Feeds at time of discharge. Referral sent to [facility].        11/17/22 1153   Post-Acute Status   Post-Acute Authorization Other  (Tube Feed)   Other Status   (Referrals Sent)   Discharge Delays None known at this time   Discharge Plan   Discharge Plan A Home Health;Home with family   Discharge Plan B Home with family     AHSAN Taylor, LCSW  Manager - Case Management

## 2022-11-17 NOTE — PLAN OF CARE
"POC reviewed with PT, stated understanding, AOX4, VSS.  Arterial doppler noted, nothing to venous heard via speaker. Team aware.  All IX and drains WDL.  TF infusing per order. Denies pain and nausea.  AMB to BR to void, liq BM X2 this shift.  ISAIAS WDL, #6 shiley to site at this time.  NO adverse events this shift, bed low, call light in reach. Will cont to manage POC.     Problem: Infection  Goal: Absence of Infection Signs and Symptoms  Outcome: Ongoing, Progressing     Problem: Adult Inpatient Plan of Care  Goal: Plan of Care Review  Outcome: Ongoing, Progressing  Goal: Patient-Specific Goal (Individualized)  Outcome: Ongoing, Progressing  Goal: Absence of Hospital-Acquired Illness or Injury  Outcome: Ongoing, Progressing  Goal: Optimal Comfort and Wellbeing  Outcome: Ongoing, Progressing  Goal: Readiness for Transition of Care  Outcome: Ongoing, Progressing     Problem: Diabetes Comorbidity  Goal: Blood Glucose Level Within Targeted Range  Outcome: Ongoing, Progressing     Problem: Laryngectomy  Goal: POD 0 - Bed rest  Outcome: Ongoing, Progressing  Goal: POD 0 - Wean mechanical vent to off  Outcome: Ongoing, Progressing  Goal: POD 1 - OOB to chair  Outcome: Ongoing, Progressing  Goal: POD 1 - Knee immobilizer for skin grafted ALT donor  Outcome: Ongoing, Progressing  Goal: POD 2 - OOB to chair TID  Outcome: Ongoing, Progressing  Goal: POD 3 & 4 - Ambulate with assist/OOB TID  Outcome: Ongoing, Progressing  Goal: POD 7 & 8 - Start "rooming in." Have family provide all care  Outcome: Ongoing, Progressing  Goal: POD 4 - Full weight bearing if ALT (no graft)  Outcome: Ongoing, Progressing  Goal: POD 5 to 10 - Ambulate TID  Outcome: Ongoing, Progressing  Goal: POD 8 - DME complete  Outcome: Ongoing, Progressing     Problem: Skin Injury Risk Increased  Goal: Skin Health and Integrity  Outcome: Ongoing, Progressing     Problem: Fall Injury Risk  Goal: Absence of Fall and Fall-Related Injury  Outcome: Ongoing, " Progressing

## 2022-11-17 NOTE — PROGRESS NOTES
Nael Carey - [facility]  Otorhinolaryngology-Head & Neck Surgery  Progress Note    Subjective:     Post-Op Info:  Procedure(s) (LRB):  TOTAL PHARYNGECTOMY  TOTAL GLOSSECTOMY (Bilateral)  CREATION, FREE FLAP, ALT (Left)  LARYNGOSCOPY, DIRECT  DISSECTION, NECK (Bilateral)  APPLICATION, GRAFT, SKIN, SPLIT-THICKNESS (Right)   8 Days Post-Op  Hospital Day: 9     Interval History: NAEON    Medications:  Continuous Infusions:  Scheduled Meds:   calcitrioL  0.25 mcg Per G Tube Daily    calcium carbonate  1,000 mg Per G Tube TID WM    enoxaparin  40 mg Subcutaneous Daily    gabapentin  300 mg Per G Tube Q8H    levothyroxine  100 mcg Per G Tube Before breakfast    polyethylene glycol  17 g Per G Tube Daily    senna-docusate 8.6-50 mg  1 tablet Per G Tube BID    traZODone  50 mg Per G Tube QHS     PRN Meds:sodium chloride 0.9%, sodium chloride 0.9%, dextrose 10%, dextrose 10%, glucagon (human recombinant), ibuprofen, insulin aspart U-100, naloxone, oxyCODONE, prochlorperazine, sodium chloride 0.9%, sodium chloride 0.9%     Review of patient's allergies indicates:   Allergen Reactions    Pollen extracts     Lovastatin Rash     Not confirmed but pt skeptical     Objective:     Vital Signs (24h Range):  Temp:  [96.1 °F (35.6 °C)-98.8 °F (37.1 °C)] 98.2 °F (36.8 °C)  Pulse:  [79-90] 89  Resp:  [16-18] 16  SpO2:  [92 %-98 %] 97 %  BP: (101-113)/(56-71) 104/56       Lines/Drains/Airways       Central Venous Catheter Line  Duration             Port A Cath Single Lumen left subclavian -- days              Drain  Duration                  Open Drain Anterior;Left Neck Penrose -- days         Open Drain Right;Anterior Neck Penrose -- days         Gastrostomy/Enterostomy 06/09/22 1935 midline feeding 160 days         Closed/Suction Drain 11/09/22 1518 Left;Anterior Thigh Bulb 19 Fr. 7 days              Airway  Duration                  Surgical Airway Shiley Cuffed -- days         Laryngectomy (Do Not Remove) 06/09/22 1414  Laryngectomy stoma 160 days         Airway - Non-Surgical 11/09/22 0856 Coil Wire Tube 7 days              Peripheral Intravenous Line  Duration                  Peripheral IV - Single Lumen 11/13/22 0937 20 G Anterior;Right Forearm 3 days         Peripheral IV - Single Lumen 11/13/22 1617 18 G Anterior;Left;Proximal Forearm 3 days                    Physical Exam  Constitutional:       General: He is not in acute distress.  HENT:      Head:      Comments: Diffuse facial edema bilaterally but stable, soft to palpation     Mouth/Throat:      Comments: Flap soft to palpation, sutures to FOM intact   Eyes:      Extraocular Movements: Extraocular movements intact.   Neck:      Comments: Trach in place to stoma   Skin graft to midline neck overlying flap, dry  Staples c/d/I on lateral neck, penroses in place bilaterally   Doppler with good venous and arterial signals   Cardiovascular:      Rate and Rhythm: Normal rate and regular rhythm.   Pulmonary:      Effort: No respiratory distress.   Neurological:      Mental Status: He is alert.     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 Now s/p total glossectomy, pharyngectomy, and ALT free flap and STSG reconstruction on 11/9.        ENT/HNS:  - Q4H flap checks    - Keep tracheostomy in place w/ cuff inflated   - OK to replace tracheostomy if dislodged, only in place for swelling/secretion assistance    - OK  "for neck ties   - SLP evaluation  - Bacitracin ointemnt to incision sites   -Sign above bed + outside door stating patient is "neck breather" and "can not be intubated by mouth"  -Clean laryngectomy stoma as needed, or daily  -Fresh wound care, clean incision with peroxide/qtip followed by bacitracin BID  -Humidified O2 via trach collar     Neuro:   - Pain: Multimodality PRN regimen initiated     Cardiac:  - HDS  - Page ENT before starting vasopressors  - H/O of paroxysmal a fib, will CTM      Pulmonary:   - PRN breathing treatments  - Humidified air per trach collar  - OK to remove tracheostomy for adequate suctioning      Renal:   - monitor Is and Os  - Cr stable  - Juarez replaced 11/11, removed 11/16   - Voiding well s/p removal      Infectious Disease:  - Daily CBC  - Unasyn completed     Hematology/Oncology:  - H/H stable  - Lovenox DVT prophylaxis      FEN/GI  - Postoperative hypoparathyroidism, iCal stable    - Tums QID, calcitriol   - Replace electrolytes as needed to keep K>4, Mg>2  - Bowel reg  - Protonix for GI prophylaxis  - Nausea control ondansetron, phenergan  - Continue continuous TFs      MSK  - Out of bed as tolerated  - Drain care  - Island dressing removed, bacitracin ointment to incision site   - STSG donor site healed      Dispo:  - Pending toleration of bolus feeds. Patient has supplies at home from prior surgery          Jo Ann Bryant MD  Otorhinolaryngology-Head & Neck Surgery  [facility]  "

## 2022-11-17 NOTE — ASSESSMENT & PLAN NOTE
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 Now s/p total glossectomy, pharyngectomy, and ALT free flap and STSG reconstruction on 11/9.        ENT/HNS:  - Q4H flap checks    - Keep tracheostomy in place w/ cuff inflated   - OK to replace tracheostomy if dislodged, only in place for swelling/secretion assistance    - OK for neck ties   - SLP evaluation  - Bacitracin ointemnt to incision sites   -Sign above bed + outside door stating patient is "neck breather" and "can not be intubated by mouth"  -Clean laryngectomy stoma as needed, or daily  -Fresh wound care, clean incision with peroxide/qtip followed by bacitracin BID  -Humidified O2 via trach collar     Neuro:   - Pain: Multimodality PRN regimen initiated     Cardiac:  - HDS  - Page ENT before starting vasopressors  - H/O of paroxysmal a fib, will CTM      Pulmonary:   - PRN breathing treatments  - Humidified air per trach collar  - OK to remove tracheostomy for adequate suctioning      Renal:   - monitor Is and Os  - Cr stable  - Juarez replaced 11/11, removed 11/16   - Voiding well s/p removal      Infectious Disease:  - Daily CBC  - Unasyn completed     Hematology/Oncology:  - H/H stable  - Lovenox DVT prophylaxis      FEN/GI  - Postoperative hypoparathyroidism, iCal stable    - Tums QID, calcitriol   - Replace electrolytes as needed to keep K>4, Mg>2  - Bowel reg  - Protonix for GI prophylaxis  - Nausea control ondansetron, phenergan  - Continue continuous TFs      MSK  - Out of bed as tolerated  - Drain care  - Island dressing removed, bacitracin ointment to incision site   - STSG donor site healed      Dispo:  - Pending toleration of bolus feeds. Patient has supplies at home from prior surgery    "

## 2022-11-18 LAB
POCT GLUCOSE: 141 MG/DL (ref 70–110)
POCT GLUCOSE: 162 MG/DL (ref 70–110)
POCT GLUCOSE: 200 MG/DL (ref 70–110)
POCT GLUCOSE: 202 MG/DL (ref 70–110)
POCT GLUCOSE: 216 MG/DL (ref 70–110)

## 2022-11-18 PROCEDURE — 94761 N-INVAS EAR/PLS OXIMETRY MLT: CPT

## 2022-11-18 PROCEDURE — 99900035 HC TECH TIME PER 15 MIN (STAT)

## 2022-11-18 PROCEDURE — 97535 SELF CARE MNGMENT TRAINING: CPT

## 2022-11-18 PROCEDURE — 25000003 PHARM REV CODE 250: Performed by: STUDENT IN AN ORGANIZED HEALTH CARE EDUCATION/TRAINING PROGRAM

## 2022-11-18 PROCEDURE — 99900026 HC AIRWAY MAINTENANCE (STAT)

## 2022-11-18 PROCEDURE — 20600001 HC STEP DOWN PRIVATE ROOM

## 2022-11-18 PROCEDURE — 63700000 PHARM REV CODE 250 ALT 637 W/O HCPCS

## 2022-11-18 PROCEDURE — 63600175 PHARM REV CODE 636 W HCPCS: Performed by: STUDENT IN AN ORGANIZED HEALTH CARE EDUCATION/TRAINING PROGRAM

## 2022-11-18 PROCEDURE — 25000003 PHARM REV CODE 250

## 2022-11-18 RX ORDER — BISMUTH SUBSALICYLATE 525 MG/30ML
30 LIQUID ORAL ONCE
Status: COMPLETED | OUTPATIENT
Start: 2022-11-18 | End: 2022-11-18

## 2022-11-18 RX ADMIN — BISMUTH SUBSALICYLATE 30 ML: 525 LIQUID ORAL at 11:11

## 2022-11-18 RX ADMIN — GABAPENTIN 300 MG: 250 SOLUTION ORAL at 01:11

## 2022-11-18 RX ADMIN — ENOXAPARIN SODIUM 40 MG: 100 INJECTION SUBCUTANEOUS at 04:11

## 2022-11-18 RX ADMIN — GABAPENTIN 300 MG: 250 SOLUTION ORAL at 09:11

## 2022-11-18 RX ADMIN — SENNOSIDES AND DOCUSATE SODIUM 1 TABLET: 50; 8.6 TABLET ORAL at 09:11

## 2022-11-18 RX ADMIN — CALCIUM CARBONATE 1000 MG: 1250 SUSPENSION ORAL at 01:11

## 2022-11-18 RX ADMIN — GABAPENTIN 300 MG: 250 SOLUTION ORAL at 05:11

## 2022-11-18 RX ADMIN — CALCITRIOL 0.25 MCG: 1 SOLUTION ORAL at 08:11

## 2022-11-18 RX ADMIN — TRAZODONE HYDROCHLORIDE 50 MG: 50 TABLET ORAL at 09:11

## 2022-11-18 RX ADMIN — LEVOTHYROXINE SODIUM 100 MCG: 100 TABLET ORAL at 05:11

## 2022-11-18 RX ADMIN — CALCIUM CARBONATE 1000 MG: 1250 SUSPENSION ORAL at 07:11

## 2022-11-18 RX ADMIN — CALCIUM CARBONATE 1000 MG: 1250 SUSPENSION ORAL at 04:11

## 2022-11-18 NOTE — PLAN OF CARE
Recommendations     1) Bolus TF Recs: Peptamen 1.5 Prebio, 5 can/day    -Provides 1875 kcals, 85 g protein, 960 ml fluid   -If Pt refuses Peptamen 1.5 Prebio, consider Nutren 1.5     2) RD to monitor and f/u.     Goals: Meet % of kcal/protein needs by RD f/u date  Nutrition Goal Status: progressing towards goal  Communication of RD Recs:  (POC)      RD contacted for new TF recs due to pt refusing [employer] 1.4 Bolus today. Pt experienced diarrhea with Impact peptide 1.5. States he'd like to try Jevity 1.5, however unable to source in house at this time. Please contact RD with any further questions.     Thanks,     Milagro Menchaca, Registration Eligible, Provisional LDN

## 2022-11-18 NOTE — PROGRESS NOTES
11/18/22 1400        Laryngectomy (Do Not Remove) 06/09/22 1414 Laryngectomy stoma   Placement Date/Time: 06/09/22 1414   Present Prior to Hospital Arrival?: Yes  Type: Laryngectomy stoma   Site Assessment Crusty   Site Care Cleansed;Dried   Ties Assessment Changed   Extra mp tubes are at the bedside? Yes   Suction set is at the bedside? Yes   Communication board is with the patient? Yes   Patient is wearing alert bracelet? Yes        Incision/Site 11/09/22 1746 Neck   Date First Assessed/Time First Assessed: 11/09/22 1746   Location: Neck   Incision WDL ex   Dressing Appearance Open to air   Drainage Amount Small   Appearance Staples intact   Periwound Area Intact   Care Cleansed with:;Sterile normal saline;Applied:  (bacitracin)   Periwound Care Topical treatment applied        Flap 11/09/22 2030 Pharnygectomy and glossectomy with flap; internal doppler   Date First Assessed/Time First Assessed: 11/09/22 2030   Pre-existing: No  Orientation: upper  Location: Throat  Wound Description (Comments): Pharnygectomy and glossectomy with flap; internal doppler   Appearance Pink   Flap Palpation soft   Doppler Pulses Arterial Strong   Dressing Appearance Open to air        Open Drain Right;Anterior Neck Penrose   No placement date or time found.   Orientation: Right;Anterior  Location: Neck  Drain Type: Penrose   Site Description Edema/swelling;Leaking at site   Drainage Purulent  (thick drainage)   Status Unclamped        Open Drain Anterior;Left Neck Penrose   No placement date or time found.   Orientation: Anterior;Left  Location: Neck  Drain Type: Penrose   Site Description Healing   Dressing Status Other (Comment)  (HENRY)   Drainage None   Status Other (Comment)  (fell out while changing trach ties)     Called ENT team to inform them that patient's penrose drain on the left neck fell out when trach ties were changed. During cleansing of the Right side. Noticed thick purluent drainage around R penrose drain site.  Gauze and cotton tipped applicators with sterile water was used to clean around the site. Bacitracin was applied around the insertion site at the neck. ENT team said they would be by to assess patient shortly

## 2022-11-18 NOTE — PLAN OF CARE
Recommendations    1) Continue tube feeds of Impact Peptide 1.5, gradually increasing to goal rate of 55 ml/hr to provide 1980 kcal, 90g protein, and 1008ml water.      -Monitor Tolerance     2) Once ready to transition to bolus, rec'd [employer] Standard 1.4 (home formula) - 4 cans/day to provide 1820 kcal, 80g protein, 936ml water.     3) RD to monitor and f/u.    Goals: Meet % of kcal/protein needs by RD f/u date  Nutrition Goal Status: progressing towards goal  Communication of RD Recs:  (POC)

## 2022-11-18 NOTE — PROGRESS NOTES
Nael Carey - [facility]  Adult Nutrition  Progress Note    SUMMARY       Recommendations    1) Continue tube feeds of Impact Peptide 1.5, gradually increasing to goal rate of 55 ml/hr to provide 1980 kcal, 90g protein, and 1008ml water.      -Monitor Tolerance     2) Once ready to transition to bolus, rec'd [email or web address] Standard 1.4 (home formula) - 4 cans/day to provide 1820 kcal, 80g protein, 936ml water.     3) RD to monitor and f/u.    Goals: Meet % of kcal/protein needs by RD f/u date  Nutrition Goal Status: progressing towards goal  Communication of RD Recs:  (POC)    Assessment and Plan    Nutrition Problem:  Moderate/Severe Protein-Calorie Malnutrition  Malnutrition in the context of Chronic Illness/Injury    Related to (etiology):  Inability to consume sufficient energy     Signs and Symptoms (as evidenced by):    Body Fat Depletion: mild depletion of orbitals, triceps, and thoracic and lumbar region   Muscle Mass Depletion: moderate depletion of temples, clavicle region, scapular region, interosseous muscle, and lower extremities     Interventions(treatment strategy):  Collaboration with other providers     Nutrition Diagnosis Status:  New     Malnutrition Assessment  Malnutrition Type: chronic illness      Micronutrient Evaluation: no deficiencies       Orbital Region (Subcutaneous Fat Loss): mild depletion  Upper Arm Region (Subcutaneous Fat Loss): mild depletion  Thoracic and Lumbar Region: mild depletion   Lindsay Region (Muscle Loss): mild depletion  Clavicle Bone Region (Muscle Loss): other (see comments) (unable to assess)  Clavicle and Acromion Bone Region (Muscle Loss): other (see comments)  Scapular Bone Region (Muscle Loss): moderate depletion  Dorsal Hand (Muscle Loss): moderate depletion  Patellar Region (Muscle Loss): moderate depletion  Anterior Thigh Region (Muscle Loss): severe depletion  Posterior Calf Region (Muscle Loss): severe depletion   Edema (Fluid Accumulation): 0-->no edema present  "  Subcutaneous Fat Loss (Final Summary): mild protein-calorie malnutrition  Muscle Loss Evaluation (Final Summary): severe protein-calorie malnutrition         Reason for Assessment    Reason For Assessment: RD follow-up  Diagnosis:  (malignant neoplasm of base of tongue)  Relevant Medical History: HTN, DM2, Afib on OAC, GERD, 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 Used Jevity TF at home, unable to ,source in house. NFPE completed 11/17, meets ASPEN critieria for moderate chronic malnutrition. RD following.  Nutrition Discharge Planning: adequate nutrition via PEG tube    Nutrition Risk Screen    Nutrition Risk Screen: no indicators present    Nutrition/Diet History    Spiritual, Cultural Beliefs, Orthodox Practices, Values that Affect Care: no  Food Allergies: NKFA    Anthropometrics    Temp: 99 °F (37.2 °C)  Height Method: Stated  Height: 5' 6" (167.6 cm)  Height (inches): 66 in  Weight Method: Bed Scale  Weight: 73.3 kg (161 lb 9.6 oz)  Weight (lb): 161.6 lb  Ideal Body Weight (IBW), Male: 142 lb  % Ideal Body Weight, Male (lb): 113.8 %  BMI (Calculated): 26.1  BMI Grade: 18.5-24.9 - normal       Lab/Procedures/Meds    Pertinent Labs Reviewed: reviewed  Pertinent Labs Comments: H/H:7.6/23.8, MCHC:31.9, BUN:24, Glu:137, supa:8.2  Pertinent Medications Reviewed: reviewed  Pertinent Medications Comments: calcitilol, supa carbonate, enoxaparin, gabepentin, levothyroxine, senna-docusate      Estimated/Assessed Needs    Weight Used For Calorie Calculations: 65.8 kg (145 lb 1 oz)  Energy Calorie Requirements (kcal): 1974-2303  Energy Need Method: Kcal/kg  Protein Requirements: 79-99g pro/day (1.5-2 g/kg)  Weight Used For Protein Calculations: 65.8 kg (145 lb 1 oz)  Fluid Requirements (mL): 1ml/kcal or fluid per MD     RDA Method (mL): 1974         Nutrition Prescription " Ordered    Current Diet Order: NPO  Current Nutrition Support Formula Ordered: Impact Peptide 1.5  Current Nutrition Support Rate Ordered: 20 (ml)    Evaluation of Received Nutrient/Fluid Intake    Enteral Calories (kcal): 1980  Enteral Protein (gm): 124  Enteral (Free Water) Fluid (mL): 1016  % Kcal Needs: 100  % Protein Needs: 100  I/O: +185  Energy Calories Required: not meeting needs  Protein Required: not meeting needs  Fluid Required: not meeting needs  Comments: LBM 11/17  Tolerance: not tolerating  % Intake of Estimated Energy Needs: 75 - 100 %  % Meal Intake: NPO    Nutrition Risk    Level of Risk/Frequency of Follow-up: low     Monitor and Evaluation    Food and Nutrient Intake: enteral nutrition intake  Food and Nutrient Adminstration: enteral and parenteral nutrition administration  Anthropometric Measurements: weight change  Biochemical Data, Medical Tests and Procedures: electrolyte and renal panel, gastrointestinal profile, inflammatory profile, lipid profile  Nutrition-Focused Physical Findings: overall appearance, extremities, muscles and bones     Nutrition Follow-Up    RD Follow-up?: Yes    Milagro Menchaca, Registration Eligible, Provisional LDN

## 2022-11-18 NOTE — PLAN OF CARE
Discharge plan is home with home health (OT) and tube feedings when medically ready. Patient will need feedings, supplies, but should already have a home suction machine. CHIARA is set for 11/22/22.    CM/SW staff will continue to follow and assist team with needs.      Galina Martinez RN  Covering  - [facility] [place] [facility]  [phone]

## 2022-11-18 NOTE — HOSPITAL COURSE
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 Now s/p total glossectomy, pharyngectomy, and ALT free flap and STSG reconstruction on 11/9. He progressed well and he was stepped down to the floor after 48 hours. He was started on continuous tube feeds, which he tolerated well and was transitioned to bolus (his home regimen) prior to discharge. He initially had urinary retention, but his mahan was removed 11/16 and he has been voiding spontaneously since. On the day of discharge, he was ambulating independently and demonstrated ability to care for his stoma and administer his tube feeds. He will be discharged home with clinic follow up to discuss further adjuvant treatment.    Physical Exam  Constitutional:       General: He is not in acute distress.  HENT:      Head:      Comments: Diffuse facial edema bilaterally but stable, soft to palpation     Mouth/Throat:      Comments: Flap soft to palpation, sutures to FOM intact   Eyes:      Extraocular Movements: Extraocular movements intact.   Neck:      Comments: Trach in place to stoma   Skin graft to midline neck overlying flap, dry  Staples c/d/I on lateral neck, penroses in place bilaterally   Doppler with good venous and arterial signals   Cardiovascular:      Rate and Rhythm: Normal rate and regular rhythm.   Pulmonary:      Effort: No respiratory distress.   Neurological:      Mental Status: He is alert.

## 2022-11-18 NOTE — PT/OT/SLP PROGRESS
"Physical Therapy Treatment    Patient Name:  Cyrus Batres Jr.   MRN:  [id number]  Admitting Diagnosis: Malignant neoplasm of base of tongue  Recent Surgery: Procedure(s) (LRB):  TOTAL PHARYNGECTOMY  TOTAL GLOSSECTOMY (Bilateral)  CREATION, FREE FLAP, ALT (Left)  LARYNGOSCOPY, DIRECT  DISSECTION, NECK (Bilateral)  APPLICATION, GRAFT, SKIN, SPLIT-THICKNESS (Right) 9 Days Post-Op    Recommendations:     Discharge Recommendations:  home (no needs)   Discharge Equipment Recommendations:  (TBD)   Barriers to discharge: None     Plan:     During this hospitalization, patient to be seen 4 x/week to address the above listed problems via gait training, therapeutic activities  Plan of Care Expires:  12/10/22  Plan of Care Reviewed with: patient    This Plan of care has been discussed with the patient who was involved in its development and understands and is in agreement with the identified goals and treatment plan    Subjective     Communicated with nurse (Kaela) prior to session.     Patient comments: Pt communicates via communication board.  "We did this together earlier this year"  Pain/Comfort: no pain       Objective:     Patient found with: GLENN drain, PEG Tube, telemetry, Tracheostomy (internal doppler in neck)    Patient found up in the chair upon PT entry to room, agreeable to treatment.  No family present in the room.    Respiratory Status: Room air    General Precautions: Standard, fall   Orthopedic Precautions:N/A     Pt assisted with changing front and back gowns 2* being soiled with drainage    BED MOBILITY        NP 2* pt found/left seated up in the chair    TRANSFERS  (vc's for hand placement, sequencing of task and safety)   Patient completed Sit <> Stand Transfer from BS chair with sup for safety with no assistive device x1 trial(s)   Patient completed Stand <> Sit Transfer to BS chair with sup for safety with no assistive device      GAIT: in hallway, PTA manages IV pole   Patient ambulated: 250ft x2 trials, " standing rest break in between trials   Patient required: SBA to sup for safety   Patient used:  No Assistive Device   Gait Pattern observed: reciprocal gait   Gait Deviation(s):  mild instability, but no overt LOB     Comments: vc's for safety    EDUCATION  Patient provided with daily orientation and goals of this PT session. They were educated to call for assistance and to transfer with hospital staff only.  Also, pt was educated on the effects of prolonged immobility and the importance of performing OOB activity and exercises to promote healing and reduce recovery time    Whiteboard updated with correct mobility information. RN/PCT notified.  Pt safe to amb in hallway with RN/PCT or by himself: Use no AD with sup.    Patient left up in chair, with  all lines intact, call button in reach, and nurse notified    AM-PAC 6 CLICK MOBILITY  18      Assessment:     Cyrus Batres Jr. is a 72 y.o. male admitted with a medical diagnosis of Malignant neoplasm of base of tongue.  He presents with the following impairments/functional limitations:  weakness, impaired functional mobility, gait instability. requiring light assistance and verbal cues for bed mob, transfers, and gait to prevent falls due to mild instability.   Pt remains motivated to participate in PT session and will cont to benefit from skilled PT intervention.    Rehab Prognosis:  Good; patient would benefit from acute skilled PT services to address these deficits and reach maximum level of function.      GOALS:   Multidisciplinary Problems       Physical Therapy Goals          Problem: Physical Therapy    Goal Priority Disciplines Outcome Goal Variances Interventions   Physical Therapy Goal     PT, PT/OT Ongoing, Progressing     Description: Goals to be met by: 2022     Patient will increase functional independence with mobility by performin. Supine to sit with [place] -not met  2. Sit to supine with [place] -not met  3. Sit to stand  transfer with Stand-by Assistance -not met  4. Bed to chair transfer with Stand-by Assistance using No Assistive Device -not met  5. Gait  x 100 feet with Stand-by Assistance using No Assistive Device. -not met                         Time Tracking:     PT Received On: 11/17/22  PT Start Time: 1526     PT Stop Time: 1557  PT Total Time (min): 31 min     Billable Minutes: Gait Training 21 and Therapeutic Activity 10    Treatment Type: Treatment  PT/PTA: PTA     PTA Visit Number: 1       BRIGITTE Harding.  Pager [phone]    11/17/2022    .

## 2022-11-18 NOTE — PLAN OF CARE
"Pt aaox4. No s/s of distress. No complaints. VSS. Had to tracheal suction patient a few times last night at pt's request. Tolerating TF. Pt anxious to go home. Good UO throughout the night. No changes to incision sites. NAEON.    Problem: Infection  Goal: Absence of Infection Signs and Symptoms  Outcome: Ongoing, Progressing     Problem: Adult Inpatient Plan of Care  Goal: Plan of Care Review  Outcome: Ongoing, Progressing  Goal: Patient-Specific Goal (Individualized)  Outcome: Ongoing, Progressing  Goal: Absence of Hospital-Acquired Illness or Injury  Outcome: Ongoing, Progressing  Goal: Optimal Comfort and Wellbeing  Outcome: Ongoing, Progressing  Goal: Readiness for Transition of Care  Outcome: Ongoing, Progressing     Problem: Diabetes Comorbidity  Goal: Blood Glucose Level Within Targeted Range  Outcome: Ongoing, Progressing     Problem: Laryngectomy  Goal: POD 0 - Bed rest  Outcome: Ongoing, Progressing  Goal: POD 0 - Wean mechanical vent to off  Outcome: Ongoing, Progressing  Goal: POD 1 - OOB to chair  Outcome: Ongoing, Progressing  Goal: POD 1 - Knee immobilizer for skin grafted ALT donor  Outcome: Ongoing, Progressing  Goal: POD 2 - OOB to chair TID  Outcome: Ongoing, Progressing  Goal: POD 3 & 4 - Ambulate with assist/OOB TID  Outcome: Ongoing, Progressing  Goal: POD 7 & 8 - Start "rooming in." Have family provide all care  Outcome: Ongoing, Progressing  Goal: POD 4 - Full weight bearing if ALT (no graft)  Outcome: Ongoing, Progressing  Goal: POD 5 to 10 - Ambulate TID  Outcome: Ongoing, Progressing  Goal: POD 8 - DME complete  Outcome: Ongoing, Progressing     Problem: Skin Injury Risk Increased  Goal: Skin Health and Integrity  Outcome: Ongoing, Progressing     Problem: Fall Injury Risk  Goal: Absence of Fall and Fall-Related Injury  Outcome: Ongoing, Progressing     "

## 2022-11-18 NOTE — PT/OT/SLP PROGRESS
Physical Therapy  Continue Current Plan of Care     Patient Name:  Cyrus Batres Jr.   MRN:  [id number]  Admitting Diagnosis:  Malignant neoplasm of base of tongue   Recent Surgery: Procedure(s) (LRB):  TOTAL PHARYNGECTOMY  TOTAL GLOSSECTOMY (Bilateral)  CREATION, FREE FLAP, ALT (Left)  LARYNGOSCOPY, DIRECT  DISSECTION, NECK (Bilateral)  APPLICATION, GRAFT, SKIN, SPLIT-THICKNESS (Right) 9 Days Post-Op  Admit Date: 11/9/2022  Length of Stay: 9 days    Patient not seen on this date, however per chart review pt continues to benefit from acute PT services. PT to follow up as POC allows. Please continue progressive mobility as appropriate.    Discharge Disposition Recommendation: Home, no PT needs    BRIGITTE Harding  11/18/2022  Pager: [phone]

## 2022-11-18 NOTE — PLAN OF CARE
"  Problem: Infection  Goal: Absence of Infection Signs and Symptoms  Outcome: Ongoing, Progressing     Problem: Adult Inpatient Plan of Care  Goal: Plan of Care Review  Outcome: Ongoing, Progressing  Goal: Patient-Specific Goal (Individualized)  Outcome: Ongoing, Progressing  Goal: Absence of Hospital-Acquired Illness or Injury  Outcome: Ongoing, Progressing  Goal: Optimal Comfort and Wellbeing  Outcome: Ongoing, Progressing  Goal: Readiness for Transition of Care  Outcome: Ongoing, Progressing     Problem: Diabetes Comorbidity  Goal: Blood Glucose Level Within Targeted Range  Outcome: Ongoing, Progressing     Problem: Laryngectomy  Goal: POD 0 - Bed rest  Outcome: Ongoing, Progressing  Goal: POD 0 - Wean mechanical vent to off  Outcome: Ongoing, Progressing  Goal: POD 1 - OOB to chair  Outcome: Ongoing, Progressing  Goal: POD 1 - Knee immobilizer for skin grafted ALT donor  Outcome: Ongoing, Progressing  Goal: POD 2 - OOB to chair TID  Outcome: Ongoing, Progressing  Goal: POD 3 & 4 - Ambulate with assist/OOB TID  Outcome: Ongoing, Progressing  Goal: POD 7 & 8 - Start "rooming in." Have family provide all care  Outcome: Ongoing, Progressing  Goal: POD 4 - Full weight bearing if ALT (no graft)  Outcome: Ongoing, Progressing  Goal: POD 5 to 10 - Ambulate TID  Outcome: Ongoing, Progressing  Goal: POD 8 - DME complete  Outcome: Ongoing, Progressing     Problem: Skin Injury Risk Increased  Goal: Skin Health and Integrity  Outcome: Ongoing, Progressing     Problem: Fall Injury Risk  Goal: Absence of Fall and Fall-Related Injury  Outcome: Ongoing, Progressing     "

## 2022-11-18 NOTE — PROGRESS NOTES
[facility]  Otorhinolaryngology-Head & Neck Surgery  Progress Note    Subjective:     Post-Op Info:  Procedure(s) (LRB):  TOTAL PHARYNGECTOMY  TOTAL GLOSSECTOMY (Bilateral)  CREATION, FREE FLAP, ALT (Left)  LARYNGOSCOPY, DIRECT  DISSECTION, NECK (Bilateral)  APPLICATION, GRAFT, SKIN, SPLIT-THICKNESS (Right)   9 Days Post-Op  Hospital Day: 10     Interval History: NAEON    Medications:  Continuous Infusions:  Scheduled Meds:   calcitrioL  0.25 mcg Per G Tube Daily    calcium carbonate  1,000 mg Per G Tube TID WM    enoxaparin  40 mg Subcutaneous Daily    gabapentin  300 mg Per G Tube Q8H    levothyroxine  100 mcg Per G Tube Before breakfast    polyethylene glycol  17 g Per G Tube Daily    senna-docusate 8.6-50 mg  1 tablet Per G Tube BID    traZODone  50 mg Per G Tube QHS     PRN Meds:sodium chloride 0.9%, sodium chloride 0.9%, dextrose 10%, dextrose 10%, glucagon (human recombinant), ibuprofen, insulin aspart U-100, naloxone, oxyCODONE, prochlorperazine, sodium chloride 0.9%, sodium chloride 0.9%     Review of patient's allergies indicates:   Allergen Reactions    Pollen extracts     Lovastatin Rash     Not confirmed but pt skeptical     Objective:     Vital Signs (24h Range):  Temp:  [97.4 °F (36.3 °C)-99 °F (37.2 °C)] 98.5 °F (36.9 °C)  Pulse:  [] 98  Resp:  [16-18] 18  SpO2:  [97 %-99 %] 97 %  BP: (100-126)/(58-71) 126/71       Lines/Drains/Airways       Central Venous Catheter Line  Duration             Port A Cath Single Lumen left subclavian -- days              Drain  Duration                  Open Drain Anterior;Left Neck Penrose -- days         Open Drain Right;Anterior Neck Penrose -- days         Gastrostomy/Enterostomy 06/09/22 1935 midline feeding 161 days         Closed/Suction Drain 11/09/22 1518 Left;Anterior Thigh Bulb 19 Fr. 8 days              Airway  Duration                  Surgical Airway Shiley Cuffed -- days         Laryngectomy (Do Not Remove) 06/09/22 1414  Laryngectomy stoma 161 days         Airway - Non-Surgical 11/09/22 0856 Coil Wire Tube 8 days              Peripheral Intravenous Line  Duration                  Peripheral IV - Single Lumen 11/13/22 0937 20 G Anterior;Right Forearm 4 days         Peripheral IV - Single Lumen 11/13/22 1617 18 G Anterior;Left;Proximal Forearm 4 days                    Physical Exam  Constitutional:       General: He is not in acute distress.  HENT:      Head:      Comments: Diffuse facial edema bilaterally but stable, soft to palpation     Mouth/Throat:      Comments: Flap soft to palpation, sutures to FOM intact   Eyes:      Extraocular Movements: Extraocular movements intact.   Neck:      Comments: Trach in place to stoma   Skin graft to midline neck overlying flap, dry  Staples c/d/I on lateral neck, penroses in place bilaterally   Doppler with good venous and arterial signals   Cardiovascular:      Rate and Rhythm: Normal rate and regular rhythm.   Pulmonary:      Effort: No respiratory distress.   Neurological:      Mental Status: He is alert.     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 Now s/p total glossectomy, pharyngectomy, and ALT free flap and STSG reconstruction on 11/9.        ENT/HNS:  - Q4H flap checks    - Keep tracheostomy in place w/ cuff inflated   - OK to replace tracheostomy if dislodged, only in place for swelling/secretion assistance    - OK  "for neck ties   - SLP evaluation  - Bacitracin ointemnt to incision sites   -Sign above bed + outside door stating patient is "neck breather" and "can not be intubated by mouth"  -Clean laryngectomy stoma as needed, or daily  -Fresh wound care, clean incision with peroxide/qtip followed by bacitracin BID  -Humidified O2 via trach collar     Neuro:   - Pain: Multimodality PRN regimen initiated     Cardiac:  - HDS  - Page ENT before starting vasopressors  - H/O of paroxysmal a fib, will CTM      Pulmonary:   - PRN breathing treatments  - Humidified air per trach collar  - OK to remove tracheostomy for adequate suctioning      Renal:   - monitor Is and Os  - Cr stable  - [place] replaced 11/11, removed 11/16   - Voiding well s/p removal      Infectious Disease:  - Daily CBC  - Unasyn completed     Hematology/Oncology:  - H/H stable  - Lovenox DVT prophylaxis      FEN/GI  - Postoperative hypoparathyroidism, iCal stable    - Tums QID, calcitriol   - Replace electrolytes as needed to keep K>4, Mg>2  - Bowel reg  - Protonix for GI prophylaxis  - Nausea control ondansetron, phenergan  - Continue continuous TFs      MSK  - Out of bed as tolerated  - Drain care  - Island dressing removed, bacitracin ointment to incision site   - STSG donor site healed      Dispo:  - Pending toleration of bolus feeds. Patient has supplies at home from prior surgery          Jo Ann Bryant MD  Otorhinolaryngology-Head & Neck Surgery  [facility]  "

## 2022-11-18 NOTE — PT/OT/SLP PROGRESS
Occupational Therapy   Treatment    Name: Cyrus Batres Jr.  MRN: [id number]  Admitting Diagnosis:  Malignant neoplasm of base of tongue  9 Days Post-Op    Recommendations:     Discharge Recommendations: other (see comments) (HHOT)  Discharge Equipment Recommendations:  other (see comments) (TBD)  Barriers to discharge:  None    Assessment:     Cyrus Batres Jr. is a 72 y.o. male with a medical diagnosis of Malignant neoplasm of base of tongue.  Performance deficits affecting function are weakness, impaired self care skills, impaired endurance, visual deficits, edema.     Pt presents seated upright on EOB with increased activity tolerance and eager for therapy today. Pt completed sponge bath with bath wipes at sit/stand level, req'ing min A to wash back only. Pt completed donning b/l socks while seated EOC with supervision utilizing figure 4 technique and completed fxnl transfers with SBA for safety on this date as further detailed below. Pt is progressing towards functional goals but is still functioning below PLOF. Cont POC to D/C with HHOT.   Plan:     Patient to be seen 3 x/week to address the above listed problems via self-care/home management, therapeutic activities, therapeutic exercises  Plan of Care Expires: 12/10/22  Plan of Care Reviewed with: patient    Subjective     Pain/Comfort:  Pain Rating 1: 0/10  Pain Rating Post-Intervention 1: 0/10    Objective:     Communicated with: nurse prior to session.  Patient found sitting edge of bed with GLENN drain, PEG Tube, telemetry, Tracheostomy upon OT entry to room.    General Precautions: Standard, fall   Orthopedic Precautions:N/A   Braces: N/A  Respiratory Status:  Tracheostomy     Bed Mobility:    Patient completed Supine to Sit with independence     Functional Mobility/Transfers:  Patient completed Sit <> Stand Transfer with stand by assistance  with  no assistive device   Patient completed Bed <> Chair Transfer using Step Transfer technique with stand by  assistance with no assistive device  Functional Mobility: Pt engaged in functional mobility to simulate household/community distances with SBA and no AD,  in order to maximize functional activity tolerance required for engagement in occupations of choice.    Activities of Daily Living:  Grooming: supervision to wash eyes with wash cloth while seated EOB  Bathing: minimum assistance to wash back with bath cloths while standing  Supervision to wash trunk, arms, legs, and feet at sit/stand level from EOB   Lower Body Dressing: supervision to malinda b/l socks while seated EOC      [facility] 6 Click ADL: 15    Treatment & Education:  Pt educated on role of OT, POC, and goals for therapy.    POC was dicussed with patient/caregiver, who was included in its development and is in agreement with the identified goals and treatment plan.   Patient and family aware of patient's deficits and therapy progression.   Time provided for therapeutic counseling and discussion of health disposition.   Educated on importance of EOB/OOB mobility, maintaining routine, sitting up in chair, and maximizing independence with ADLs during admission   Pt completed ADLs and functional mobility for treatment session as noted above   Pt/caregiver verbalized understanding and expressed no further concerns/questions.      Patient left up in chair with all lines intact and call button in reach    GOALS:   Multidisciplinary Problems       Occupational Therapy Goals          Problem: Occupational Therapy    Goal Priority Disciplines Outcome Interventions   Occupational Therapy Goal     OT, PT/OT Ongoing, Progressing    Description: Goals to be met by: 11/24/22     Patient will increase functional independence with ADLs by performing:    UE Dressing with [place].  LE Dressing with Stand-by Assistance.  Grooming while standing at sink with Supervision.  Toileting from toilet with Supervision for hygiene and clothing management.   Toilet transfer to toilet  with Supervision.                         Time Tracking:     OT Date of Treatment: 11/18/22  OT Start Time: 1103  OT Stop Time: 1126  OT Total Time (min): 23 min    Billable Minutes:Self Care/Home Management 23    OT/HENRY: OT     HENRY Visit Number: 0    11/18/2022

## 2022-11-18 NOTE — RESPIRATORY THERAPY
RAPID RESPONSE RESPIRATORY THERAPY PROACTIVE NOTE           Time of visit: 1022     Code Status: Full Code   : 1950  Bed: 1052/1052 A:   MRN: [id number]  Time spent at the bedside: < 15 min    SITUATION    Evaluated patient for: LDA Check     BACKGROUND    Patient has a past medical history of Abnormal CT scan, neck, Allergy, Atrial fibrillation, Chronic anticoagulation, Diabetes mellitus, type 2, Hypertension, Larynx neoplasm malignant, Postoperative hypothyroidism, and Tongue cancer.  Clinically Significant Surgical Hx: laryngectomy    24 Hours Vitals Range:  Temp:  [97.4 °F (36.3 °C)-99 °F (37.2 °C)]   Pulse:  []   Resp:  [14-18]   BP: (108-126)/(58-71)   SpO2:  [97 %-100 %]     Labs:    Recent Labs     22  0430      K 3.7      CO2 20*   CREATININE 0.7   *   PHOS 3.6   MG 1.6        No results for input(s): PH, PCO2, PO2, HCO3, POCSATURATED, BE in the last 72 hours.    ASSESSMENT/INTERVENTIONS  Cheli pt. All supplies at bedside. On room air. 6 uncuffed shiley in stoma at this time      Last VS   Temp: 98.2 °F (36.8 °C) ( 1520)  Pulse: 78 ( 1520)  Resp: 18 ( 1520)  BP: 118/59 ( 1520)  SpO2: 100 % ( 1520)      Extra trachs at bedside: 4 & 6 cuffed AND ETT 6,7,8  Level of Consciousness: Level of Consciousness (AVPU): alert  Respiratory Effort: Respiratory Effort: Unlabored Expansion/Accessory Muscle Usage: Expansion/Accessory Muscles/Retractions: expansion symmetric, no retractions, no use of accessory muscles  All Lung Field Breath Sounds: All Lung Fields Breath Sounds: Anterior:, Lateral:, clear, diminished, equal bilaterally  O2 Device/Concentration: room air  Surgical airway: Yes, Type: Shiley Size: 6,  .  Ambu at bedside: Ambu bag with the patient?: Yes, Adult Ambu     Active Orders   Respiratory Care    Oxygen Continuous     Frequency: Continuous     Number of Occurrences: Until Specified     Order Questions:      Device type: Low flow      Device:  Trach Collar      FiO2%: 28      Titrate O2 per Oxygen Titration Protocol: Yes      To maintain SpO2 goal of: >= 90%      Notify MD of: Inability to achieve desired SpO2; Sudden change in patient status and requires 20% increase in FiO2; Patient requires >60% FiO2    Respiratory care evaluation only Daily     Frequency: Daily     Number of Occurrences: Until Specified    Routine tracheostomy care     Frequency: BID     Number of Occurrences: Until Specified    Stoma Care by RT Q4H     Frequency: Q4H     Number of Occurrences: Until Specified       RECOMMENDATIONS    We recommend: RRT Recs: Continue POC per primary team.      FOLLOW-UP    Please call back the Rapid Response RT, Elizabeth Rapp, RRT at x [phone] for any questions or concerns.

## 2022-11-18 NOTE — SUBJECTIVE & OBJECTIVE
Interval History: NAEON    Medications:  Continuous Infusions:  Scheduled Meds:   calcitrioL  0.25 mcg Per G Tube Daily    calcium carbonate  1,000 mg Per G Tube TID WM    enoxaparin  40 mg Subcutaneous Daily    gabapentin  300 mg Per G Tube Q8H    levothyroxine  100 mcg Per G Tube Before breakfast    polyethylene glycol  17 g Per G Tube Daily    senna-docusate 8.6-50 mg  1 tablet Per G Tube BID    traZODone  50 mg Per G Tube QHS     PRN Meds:sodium chloride 0.9%, sodium chloride 0.9%, dextrose 10%, dextrose 10%, glucagon (human recombinant), ibuprofen, insulin aspart U-100, naloxone, oxyCODONE, prochlorperazine, sodium chloride 0.9%, sodium chloride 0.9%     Review of patient's allergies indicates:   Allergen Reactions    Pollen extracts     Lovastatin Rash     Not confirmed but pt skeptical     Objective:     Vital Signs (24h Range):  Temp:  [97.4 °F (36.3 °C)-99 °F (37.2 °C)] 98.5 °F (36.9 °C)  Pulse:  [] 98  Resp:  [16-18] 18  SpO2:  [97 %-99 %] 97 %  BP: (100-126)/(58-71) 126/71       Lines/Drains/Airways       Central Venous Catheter Line  Duration             Port A Cath Single Lumen left subclavian -- days              Drain  Duration                  Open Drain Anterior;Left Neck Penrose -- days         Open Drain Right;Anterior Neck Penrose -- days         Gastrostomy/Enterostomy 06/09/22 1935 midline feeding 161 days         Closed/Suction Drain 11/09/22 1518 Left;Anterior Thigh Bulb 19 Fr. 8 days              Airway  Duration                  Surgical Airway Shiley Cuffed -- days         Laryngectomy (Do Not Remove) 06/09/22 1414 Laryngectomy stoma 161 days         Airway - Non-Surgical 11/09/22 0856 Coil Wire Tube 8 days              Peripheral Intravenous Line  Duration                  Peripheral IV - Single Lumen 11/13/22 0937 20 G Anterior;Right Forearm 4 days         Peripheral IV - Single Lumen 11/13/22 1617 18 G Anterior;Left;Proximal Forearm 4 days                    Physical  Exam  Constitutional:       General: He is not in acute distress.  HENT:      Head:      Comments: Diffuse facial edema bilaterally but stable, soft to palpation     Mouth/Throat:      Comments: Flap soft to palpation, sutures to FOM intact   Eyes:      Extraocular Movements: Extraocular movements intact.   Neck:      Comments: Trach in place to stoma   Skin graft to midline neck overlying flap, dry  Staples c/d/I on lateral neck, penroses in place bilaterally   Doppler with good venous and arterial signals   Cardiovascular:      Rate and Rhythm: Normal rate and regular rhythm.   Pulmonary:      Effort: No respiratory distress.   Neurological:      Mental Status: He is alert.     Significant Labs:  CBC:   Recent Labs   Lab 11/16/22  0430   WBC 7.19  7.19   RBC 2.51*  2.51*   HGB 7.6*  7.6*   HCT 23.8*  23.8*     261   MCV 95  95   MCH 30.3  30.3   MCHC 31.9*  31.9*     CMP:   Recent Labs   Lab 11/16/22  0430   *   CALCIUM 8.2*   ALBUMIN 2.3*   PROT 5.8*      K 3.7   CO2 20*      BUN 24*   CREATININE 0.7   ALKPHOS 59   ALT 8*   AST 11   BILITOT 0.5       Significant Diagnostics:  None

## 2022-11-19 LAB
POCT GLUCOSE: 206 MG/DL (ref 70–110)
POCT GLUCOSE: 210 MG/DL (ref 70–110)
POCT GLUCOSE: 245 MG/DL (ref 70–110)

## 2022-11-19 PROCEDURE — 99900035 HC TECH TIME PER 15 MIN (STAT)

## 2022-11-19 PROCEDURE — 99900026 HC AIRWAY MAINTENANCE (STAT)

## 2022-11-19 PROCEDURE — 25000003 PHARM REV CODE 250

## 2022-11-19 PROCEDURE — 25000003 PHARM REV CODE 250: Performed by: STUDENT IN AN ORGANIZED HEALTH CARE EDUCATION/TRAINING PROGRAM

## 2022-11-19 PROCEDURE — 99900022

## 2022-11-19 PROCEDURE — 94761 N-INVAS EAR/PLS OXIMETRY MLT: CPT

## 2022-11-19 PROCEDURE — 63700000 PHARM REV CODE 250 ALT 637 W/O HCPCS

## 2022-11-19 PROCEDURE — 27000221 HC OXYGEN, UP TO 24 HOURS

## 2022-11-19 PROCEDURE — 63600175 PHARM REV CODE 636 W HCPCS: Performed by: STUDENT IN AN ORGANIZED HEALTH CARE EDUCATION/TRAINING PROGRAM

## 2022-11-19 PROCEDURE — 20600001 HC STEP DOWN PRIVATE ROOM

## 2022-11-19 PROCEDURE — 99900031 HC PATIENT EDUCATION (STAT)

## 2022-11-19 RX ORDER — PANTOPRAZOLE SODIUM 40 MG/1
40 FOR SUSPENSION ORAL DAILY
Status: DISCONTINUED | OUTPATIENT
Start: 2022-11-19 | End: 2022-11-20 | Stop reason: HOSPADM

## 2022-11-19 RX ORDER — HYDROCODONE BITARTRATE AND ACETAMINOPHEN 7.5; 325 MG/15ML; MG/15ML
15 SOLUTION ORAL EVERY 6 HOURS PRN
Qty: 473 ML | Refills: 0 | Status: SHIPPED | OUTPATIENT
Start: 2022-11-19 | End: 2022-12-16 | Stop reason: ALTCHOICE

## 2022-11-19 RX ADMIN — TRAZODONE HYDROCHLORIDE 50 MG: 50 TABLET ORAL at 11:11

## 2022-11-19 RX ADMIN — PROCHLORPERAZINE EDISYLATE 5 MG: 5 INJECTION INTRAMUSCULAR; INTRAVENOUS at 01:11

## 2022-11-19 RX ADMIN — ENOXAPARIN SODIUM 40 MG: 100 INJECTION SUBCUTANEOUS at 04:11

## 2022-11-19 RX ADMIN — GABAPENTIN 300 MG: 250 SOLUTION ORAL at 06:11

## 2022-11-19 RX ADMIN — LEVOTHYROXINE SODIUM 100 MCG: 100 TABLET ORAL at 06:11

## 2022-11-19 RX ADMIN — PROCHLORPERAZINE EDISYLATE 5 MG: 5 INJECTION INTRAMUSCULAR; INTRAVENOUS at 06:11

## 2022-11-19 RX ADMIN — PANTOPRAZOLE SODIUM 40 MG: 40 GRANULE, DELAYED RELEASE ORAL at 12:11

## 2022-11-19 RX ADMIN — GABAPENTIN 300 MG: 250 SOLUTION ORAL at 03:11

## 2022-11-19 RX ADMIN — CALCIUM CARBONATE 1000 MG: 1250 SUSPENSION ORAL at 04:11

## 2022-11-19 RX ADMIN — CALCIUM CARBONATE 1000 MG: 1250 SUSPENSION ORAL at 12:11

## 2022-11-19 RX ADMIN — INSULIN ASPART 2 UNITS: 100 INJECTION, SOLUTION INTRAVENOUS; SUBCUTANEOUS at 05:11

## 2022-11-19 RX ADMIN — GABAPENTIN 300 MG: 250 SOLUTION ORAL at 11:11

## 2022-11-19 RX ADMIN — CALCITRIOL 0.25 MCG: 1 SOLUTION ORAL at 09:11

## 2022-11-19 RX ADMIN — CALCIUM CARBONATE 1000 MG: 1250 SUSPENSION ORAL at 06:11

## 2022-11-19 NOTE — PLAN OF CARE
Pt TF formula changed to Jevity but it is not carried in stock. Shiva placed to ENT to change order for TF

## 2022-11-19 NOTE — ASSESSMENT & PLAN NOTE
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 Now s/p total glossectomy, pharyngectomy, and ALT free flap and STSG reconstruction on 11/9.        ENT/HNS:  - Q4H flap checks    - Keep tracheostomy in place w/ cuff inflated   - OK to replace tracheostomy if dislodged, only in place for swelling/secretion assistance    - OK for neck ties   - SLP evaluation  - Bacitracin ointemnt to incision sites   -Sign above bed + outside door stating patient is "neck breather" and "can not be intubated by mouth"  -Clean laryngectomy stoma as needed, or daily  -Fresh wound care, clean incision with peroxide/qtip followed by bacitracin BID  -Humidified O2 via trach collar     Neuro:   - Pain: Multimodality PRN regimen initiated     Cardiac:  - HDS  - Page ENT before starting vasopressors  - H/O of paroxysmal a fib, will CTM      Pulmonary:   - PRN breathing treatments  - Humidified air per trach collar  - OK to remove tracheostomy for adequate suctioning      Renal:   - monitor Is and Os  - Cr stable  - Juarez replaced 11/11, removed 11/16   - Voiding well s/p removal      Infectious Disease:  - Daily CBC  - Unasyn completed     Hematology/Oncology:  - H/H stable  - Lovenox DVT prophylaxis      FEN/GI  - Postoperative hypoparathyroidism, iCal stable    - Tums QID, calcitriol   - Replace electrolytes as needed to keep K>4, Mg>2  - Bowel reg  - Protonix for GI prophylaxis, GERD  - Nausea control ondansetron, phenergan  - Bolus TF w Jevity, hold for nausea     MSK  - Out of bed as tolerated  - Drain care  - Island dressing removed, bacitracin ointment to incision site   - STSG donor site healed      Dispo:  - Pending toleration of bolus feeds. Patient has supplies at home from prior surgery    "

## 2022-11-19 NOTE — PLAN OF CARE
"Pt aaox4. No s/s of distress. Only complaint last night was indigestion and pt blamed it on the bolus TF formula. Notified MD who ordered some peptol bismol which did not provide much relief for pt. Pt refused 0000 & 0600 bolus feedings. VSS. BG WDL. Trach suctioned pt last night 2x and got thick blood tinged mucus. Notified MD last night that arterial flap check did not sound as strong as previous nights. MD said to continue monitoring patient and gave no orders. WCTM.      Problem: Infection  Goal: Absence of Infection Signs and Symptoms  Outcome: Ongoing, Progressing     Problem: Adult Inpatient Plan of Care  Goal: Plan of Care Review  Outcome: Ongoing, Progressing  Goal: Patient-Specific Goal (Individualized)  Outcome: Ongoing, Progressing  Goal: Absence of Hospital-Acquired Illness or Injury  Outcome: Ongoing, Progressing  Goal: Optimal Comfort and Wellbeing  Outcome: Ongoing, Progressing  Goal: Readiness for Transition of Care  Outcome: Ongoing, Progressing     Problem: Diabetes Comorbidity  Goal: Blood Glucose Level Within Targeted Range  Outcome: Ongoing, Progressing     Problem: Laryngectomy  Goal: POD 0 - Bed rest  Outcome: Ongoing, Progressing  Goal: POD 0 - Wean mechanical vent to off  Outcome: Ongoing, Progressing  Goal: POD 1 - OOB to chair  Outcome: Ongoing, Progressing  Goal: POD 1 - Knee immobilizer for skin grafted ALT donor  Outcome: Ongoing, Progressing  Goal: POD 2 - OOB to chair TID  Outcome: Ongoing, Progressing  Goal: POD 3 & 4 - Ambulate with assist/OOB TID  Outcome: Ongoing, Progressing  Goal: POD 7 & 8 - Start "rooming in." Have family provide all care  Outcome: Ongoing, Progressing  Goal: POD 4 - Full weight bearing if ALT (no graft)  Outcome: Ongoing, Progressing  Goal: POD 5 to 10 - Ambulate TID  Outcome: Ongoing, Progressing  Goal: POD 8 - DME complete  Outcome: Ongoing, Progressing     Problem: Skin Injury Risk Increased  Goal: Skin Health and Integrity  Outcome: Ongoing, " Progressing     Problem: Fall Injury Risk  Goal: Absence of Fall and Fall-Related Injury  Outcome: Ongoing, Progressing

## 2022-11-19 NOTE — SUBJECTIVE & OBJECTIVE
Interval History: Continues to feel heartburn and nausea w bolus feeds. Requesting switch to Jevity which he can tolerate. Reported episode of emesis after rounds this am.     Medications:  Continuous Infusions:  Scheduled Meds:   calcitrioL  0.25 mcg Per G Tube Daily    calcium carbonate  1,000 mg Per G Tube TID WM    enoxaparin  40 mg Subcutaneous Daily    gabapentin  300 mg Per G Tube Q8H    levothyroxine  100 mcg Per G Tube Before breakfast    pantoprazole  40 mg Per G Tube Daily    polyethylene glycol  17 g Per G Tube Daily    senna-docusate 8.6-50 mg  1 tablet Per G Tube BID    traZODone  50 mg Per G Tube QHS     PRN Meds:sodium chloride 0.9%, sodium chloride 0.9%, dextrose 10%, dextrose 10%, glucagon (human recombinant), ibuprofen, insulin aspart U-100, naloxone, oxyCODONE, prochlorperazine, sodium chloride 0.9%, sodium chloride 0.9%     Review of patient's allergies indicates:   Allergen Reactions    Pollen extracts     Lovastatin Rash     Not confirmed but pt skeptical     Objective:     Vital Signs (24h Range):  Temp:  [96.8 °F (36 °C)-98.7 °F (37.1 °C)] 97.5 °F (36.4 °C)  Pulse:  [78-96] 96  Resp:  [16-18] 16  SpO2:  [98 %-100 %] 99 %  BP: (111-150)/(59-69) 150/69       Lines/Drains/Airways       Central Venous Catheter Line  Duration             Port A Cath Single Lumen left subclavian -- days              Drain  Duration                  Open Drain Right;Anterior Neck Penrose -- days         Gastrostomy/Enterostomy 06/09/22 1935 midline feeding 162 days         Closed/Suction Drain 11/09/22 1518 Left;Anterior Thigh Bulb 19 Fr. 9 days              Airway  Duration                  Surgical Airway Shiley Cuffed -- days         Laryngectomy (Do Not Remove) 06/09/22 1414 Laryngectomy stoma 162 days         Airway - Non-Surgical 11/09/22 0856 Coil Wire Tube 10 days              Peripheral Intravenous Line  Duration                  Peripheral IV - Single Lumen 11/13/22 0937 20 G Anterior;Right Forearm 6 days          Peripheral IV - Single Lumen 11/13/22 1617 18 G Anterior;Left;Proximal Forearm 5 days                    Physical Exam  Constitutional:       General: He is not in acute distress.  HENT:      Head:      Comments: Diffuse facial edema bilaterally but stable, soft to palpation     Mouth/Throat:      Comments: Flap soft to palpation, sutures to FOM intact   Eyes:      Extraocular Movements: Extraocular movements intact.   Neck:      Comments: Trach in place to stoma   Skin graft to midline neck overlying flap, dry  Staples c/d/I on lateral neck, penroses in place bilaterally   Doppler with good venous and arterial signals   Cardiovascular:      Rate and Rhythm: Normal rate and regular rhythm.   Pulmonary:      Effort: No respiratory distress.   Neurological:      Mental Status: He is alert.     Significant Labs:  BMP:   Recent Labs   Lab 11/16/22  0430   *      CO2 20*   BUN 24*   CREATININE 0.7   CALCIUM 8.2*   MG 1.6     CBC:   Recent Labs   Lab 11/16/22  0430   WBC 7.19  7.19   RBC 2.51*  2.51*   HGB 7.6*  7.6*   HCT 23.8*  23.8*     261   MCV 95  95   MCH 30.3  30.3   MCHC 31.9*  31.9*       Significant Diagnostics:  None

## 2022-11-19 NOTE — PLAN OF CARE
POC reviewed with pt, communicated understanding. AAOx4, VSS on RA, mp with TC. Denies pain. Bilat neck incisions HENRY, R thigh skin graft site HENRY, L thigh incision, staples, HENRY. R penrose, GLENN to L thigh, SS output.  NPO, impact peptide bolus feeds, pt tolerated, 1 occurrence of diarrhea. Pt c/o nausea, PRN compazine given with good effect. ACCU check Q6, coverage per sliding scale.   All needs met, no complaints offered at this time. Bed locked in lowest position, call bell within reach. Frequent rounds for safety.   Problem: Infection  Goal: Absence of Infection Signs and Symptoms  Outcome: Ongoing, Progressing     Problem: Adult Inpatient Plan of Care  Goal: Plan of Care Review  Outcome: Ongoing, Progressing  Goal: Patient-Specific Goal (Individualized)  Outcome: Ongoing, Progressing  Goal: Absence of Hospital-Acquired Illness or Injury  Outcome: Ongoing, Progressing  Goal: Optimal Comfort and Wellbeing  Outcome: Ongoing, Progressing  Goal: Readiness for Transition of Care  Outcome: Ongoing, Progressing     Problem: Diabetes Comorbidity  Goal: Blood Glucose Level Within Targeted Range  Outcome: Ongoing, Progressing     Problem: Laryngectomy  Goal: POD 3 & 4 - Ambulate with assist/OOB TID  Outcome: Ongoing, Progressing     Problem: Skin Injury Risk Increased  Goal: Skin Health and Integrity  Outcome: Ongoing, Progressing     Problem: Fall Injury Risk  Goal: Absence of Fall and Fall-Related Injury  Outcome: Ongoing, Progressing

## 2022-11-19 NOTE — PROGRESS NOTES
[facility] - [facility]  Otorhinolaryngology-Head & Neck Surgery  Progress Note    Subjective:     Post-Op Info:  Procedure(s) (LRB):  TOTAL PHARYNGECTOMY  TOTAL GLOSSECTOMY (Bilateral)  CREATION, FREE FLAP, ALT (Left)  LARYNGOSCOPY, DIRECT  DISSECTION, NECK (Bilateral)  APPLICATION, GRAFT, SKIN, SPLIT-THICKNESS (Right)   10 Days Post-Op  Hospital Day: 11     Interval History: Continues to feel heartburn and nausea w bolus feeds. Requesting switch to Jevity which he can tolerate. Reported episode of emesis after rounds this am.     Medications:  Continuous Infusions:  Scheduled Meds:   calcitrioL  0.25 mcg Per G Tube Daily    calcium carbonate  1,000 mg Per G Tube TID WM    enoxaparin  40 mg Subcutaneous Daily    gabapentin  300 mg Per G Tube Q8H    levothyroxine  100 mcg Per G Tube Before breakfast    pantoprazole  40 mg Per G Tube Daily    polyethylene glycol  17 g Per G Tube Daily    senna-docusate 8.6-50 mg  1 tablet Per G Tube BID    traZODone  50 mg Per G Tube QHS     PRN Meds:sodium chloride 0.9%, sodium chloride 0.9%, dextrose 10%, dextrose 10%, glucagon (human recombinant), ibuprofen, insulin aspart U-100, naloxone, oxyCODONE, prochlorperazine, sodium chloride 0.9%, sodium chloride 0.9%     Review of patient's allergies indicates:   Allergen Reactions    Pollen extracts     Lovastatin Rash     Not confirmed but pt skeptical     Objective:     Vital Signs (24h Range):  Temp:  [96.8 °F (36 °C)-98.7 °F (37.1 °C)] 97.5 °F (36.4 °C)  Pulse:  [78-96] 96  Resp:  [16-18] 16  SpO2:  [98 %-100 %] 99 %  BP: (111-150)/(59-69) 150/69       Lines/Drains/Airways       Central Venous Catheter Line  Duration             Port A Cath Single Lumen left subclavian -- days              Drain  Duration                  Open Drain Right;Anterior Neck Penrose -- days         Gastrostomy/Enterostomy 06/09/22 1935 midline feeding 162 days         Closed/Suction Drain 11/09/22 1518 Left;Anterior Thigh Bulb 19 Fr. 9 days               Airway  Duration                  Surgical Airway Shiley Cuffed -- days         Laryngectomy (Do Not Remove) 06/09/22 1414 Laryngectomy stoma 162 days         Airway - Non-Surgical 11/09/22 0856 Coil Wire Tube 10 days              Peripheral Intravenous Line  Duration                  Peripheral IV - Single Lumen 11/13/22 0937 20 G Anterior;Right Forearm 6 days         Peripheral IV - Single Lumen 11/13/22 1617 18 G Anterior;Left;Proximal Forearm 5 days                    Physical Exam  Constitutional:       General: He is not in acute distress.  HENT:      Head:      Comments: Diffuse facial edema bilaterally but stable, soft to palpation     Mouth/Throat:      Comments: Flap soft to palpation, sutures to FOM intact   Eyes:      Extraocular Movements: Extraocular movements intact.   Neck:      Comments: Trach in place to stoma   Skin graft to midline neck overlying flap, dry  Staples c/d/I on lateral neck, penroses in place bilaterally   Doppler with good venous and arterial signals   Cardiovascular:      Rate and Rhythm: Normal rate and regular rhythm.   Pulmonary:      Effort: No respiratory distress.   Neurological:      Mental Status: He is alert.     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 Now s/p total glossectomy, pharyngectomy, and ALT free flap and STSG reconstruction on 11/9.        ENT/HNS:  - Q4H flap checks    - Keep tracheostomy in place w/ cuff inflated   - OK to replace tracheostomy if dislodged, only  "in place for swelling/secretion assistance    - OK for neck ties   - SLP evaluation  - Bacitracin ointemnt to incision sites   -Sign above bed + outside door stating patient is "neck breather" and "can not be intubated by mouth"  -Clean laryngectomy stoma as needed, or daily  -Fresh wound care, clean incision with peroxide/qtip followed by bacitracin BID  -Humidified O2 via trach collar     Neuro:   - Pain: Multimodality PRN regimen initiated     Cardiac:  - HDS  - Page ENT before starting vasopressors  - H/O of paroxysmal a fib, will CTM      Pulmonary:   - PRN breathing treatments  - Humidified air per trach collar  - OK to remove tracheostomy for adequate suctioning      Renal:   - monitor Is and Os  - Cr stable  - Juarez replaced 11/11, removed 11/16   - Voiding well s/p removal      Infectious Disease:  - Daily CBC  - Unasyn completed     Hematology/Oncology:  - H/H stable  - Lovenox DVT prophylaxis      FEN/GI  - Postoperative hypoparathyroidism, iCal stable    - Tums QID, calcitriol   - Replace electrolytes as needed to keep K>4, Mg>2  - Bowel reg  - Protonix for GI prophylaxis, GERD  - Nausea control ondansetron, phenergan  - Bolus TF w Jevity, hold for nausea     MSK  - Out of bed as tolerated  - Drain care  - Island dressing removed, bacitracin ointment to incision site   - STSG donor site healed      Dispo:  - Pending toleration of bolus feeds. Patient has supplies at home from prior surgery          Samy Abbott MD  Otorhinolaryngology-Head & Neck Surgery  [facility]  "

## 2022-11-19 NOTE — NURSING
Pt had episode of uncontrolled diarrhea in room. Pt also had an episode of vomiting clear liquids. States he is nauseous. Gave PRN meds after cleaning pt up. Will hold insulin since pt was refusing bolus feeds all night. Morning sugar was 245.

## 2022-11-19 NOTE — RESPIRATORY THERAPY
R[facility] RESPIRATORY THERAPY PROACTIVE NOTE           Time of visit: 847     Code Status: Full Code   : 1950  Bed: 1052/1052 A:   MRN: [id number]  Time spent at the bedside: < 15 min    SITUATION    Evaluated patient for: LDA Check     BACKGROUND    Patient has a past medical history of Abnormal CT scan, neck, Allergy, Atrial fibrillation, Chronic anticoagulation, Diabetes mellitus, type 2, Hypertension, Larynx neoplasm malignant, Postoperative hypothyroidism, and Tongue cancer.  Clinically Significant Surgical Hx: laryngectomy    24 Hours Vitals Range:  Temp:  [96.8 °F (36 °C)-98.7 °F (37.1 °C)]   Pulse:  [78-96]   Resp:  [16-18]   BP: (113-163)/(59-82)   SpO2:  [98 %-100 %]     Labs:    No results for input(s): NA, K, CL, CO2, CREATININE, GLU, PHOS, MG in the last 72 hours.    Invalid input(s): CMP,  BUN, TBIL     No results for input(s): PH, PCO2, PO2, HCO3, POCSATURATED, BE in the last 72 hours.    ASSESSMENT/INTERVENTIONS  Laryngectomy pt. Pt on RA. Shiley cuffed size 6 trach in stoma. All supplies in room and signage in place. Extra cuffed trachs sizes 4 and 6 at bedside. ET tubes size 6, 7, and 8 at bedside.      Last VS   Temp: 98.6 °F (37 °C) ( 1258)  Pulse: 80 ( 1258)  Resp: 18 ( 1258)  BP: 163/82 ( 1258)  SpO2: 99 % ( 1258)      Extra trachs at bedside: cuffed 4 and 6.  Level of Consciousness: Level of Consciousness (AVPU): alert  Respiratory Effort: Respiratory Effort: Unlabored Expansion/Accessory Muscle Usage: Expansion/Accessory Muscles/Retractions: expansion symmetric  All Lung Field Breath Sounds: All Lung Fields Breath Sounds: diminished  O2 Device/Concentration: RA 21%.  Surgical airway: Yes, Type: Shiley Size: 6, cuffed  Ambu at bedside: Ambu bag with the patient?: Yes, Adult Ambu     Active Orders   Respiratory Care    Oxygen Continuous     Frequency: Continuous     Number of Occurrences: Until Specified     Order Questions:      Device type: Low flow       Device: Trach Collar      FiO2%: 28      Titrate O2 per Oxygen Titration Protocol: Yes      To maintain SpO2 goal of: >= 90%      Notify MD of: Inability to achieve desired SpO2; Sudden change in patient status and requires 20% increase in FiO2; Patient requires >60% FiO2    Respiratory care evaluation only Daily     Frequency: Daily     Number of Occurrences: Until Specified    Routine tracheostomy care     Frequency: BID     Number of Occurrences: Until Specified    Stoma Care by RT Q4H     Frequency: Q4H     Number of Occurrences: Until Specified       RECOMMENDATIONS    We recommend: RRT Recs: Continue POC per primary team.      FOLLOW-UP    Please call back the [facility], RRT at x [phone] for any questions or concerns.

## 2022-11-20 VITALS
HEART RATE: 117 BPM | OXYGEN SATURATION: 97 % | TEMPERATURE: 98 F | DIASTOLIC BLOOD PRESSURE: 72 MMHG | BODY MASS INDEX: 25.97 KG/M2 | SYSTOLIC BLOOD PRESSURE: 152 MMHG | HEIGHT: 66 IN | RESPIRATION RATE: 18 BRPM | WEIGHT: 161.63 LBS

## 2022-11-20 LAB
POCT GLUCOSE: 208 MG/DL (ref 70–110)
POCT GLUCOSE: 338 MG/DL (ref 70–110)

## 2022-11-20 PROCEDURE — 25000003 PHARM REV CODE 250

## 2022-11-20 PROCEDURE — 63700000 PHARM REV CODE 250 ALT 637 W/O HCPCS

## 2022-11-20 PROCEDURE — 25000242 PHARM REV CODE 250 ALT 637 W/ HCPCS: Performed by: STUDENT IN AN ORGANIZED HEALTH CARE EDUCATION/TRAINING PROGRAM

## 2022-11-20 PROCEDURE — 25000003 PHARM REV CODE 250: Performed by: STUDENT IN AN ORGANIZED HEALTH CARE EDUCATION/TRAINING PROGRAM

## 2022-11-20 PROCEDURE — 99900035 HC TECH TIME PER 15 MIN (STAT)

## 2022-11-20 PROCEDURE — 27000221 HC OXYGEN, UP TO 24 HOURS

## 2022-11-20 PROCEDURE — 27100171 HC OXYGEN HIGH FLOW UP TO 24 HOURS

## 2022-11-20 PROCEDURE — 94761 N-INVAS EAR/PLS OXIMETRY MLT: CPT

## 2022-11-20 RX ORDER — DEXTROMETHORPHAN/PSEUDOEPHED 2.5-7.5/.8
40 DROPS ORAL 4 TIMES DAILY PRN
Status: DISCONTINUED | OUTPATIENT
Start: 2022-11-20 | End: 2022-11-20 | Stop reason: HOSPADM

## 2022-11-20 RX ORDER — CALCIUM CARBONATE 1250 MG/5ML
1000 SUSPENSION ORAL
Qty: 473 ML | Refills: 12 | Status: SHIPPED | OUTPATIENT
Start: 2022-11-20 | End: 2023-01-30 | Stop reason: SDUPTHER

## 2022-11-20 RX ORDER — CALCITRIOL 1 UG/ML
0.25 SOLUTION ORAL DAILY
Qty: 15 ML | Refills: 12 | Status: SHIPPED | OUTPATIENT
Start: 2022-11-21 | End: 2023-06-15

## 2022-11-20 RX ORDER — LEVOTHYROXINE SODIUM 100 UG/1
100 TABLET ORAL
Qty: 30 TABLET | Refills: 11 | Status: SHIPPED | OUTPATIENT
Start: 2022-11-21 | End: 2024-01-24 | Stop reason: SDUPTHER

## 2022-11-20 RX ADMIN — GABAPENTIN 300 MG: 250 SOLUTION ORAL at 04:11

## 2022-11-20 RX ADMIN — CALCITRIOL 0.25 MCG: 1 SOLUTION ORAL at 08:11

## 2022-11-20 RX ADMIN — GABAPENTIN 300 MG: 250 SOLUTION ORAL at 06:11

## 2022-11-20 RX ADMIN — OXYCODONE HYDROCHLORIDE 5 MG: 5 SOLUTION ORAL at 10:11

## 2022-11-20 RX ADMIN — PANTOPRAZOLE SODIUM 40 MG: 40 GRANULE, DELAYED RELEASE ORAL at 08:11

## 2022-11-20 RX ADMIN — INSULIN ASPART 4 UNITS: 100 INJECTION, SOLUTION INTRAVENOUS; SUBCUTANEOUS at 12:11

## 2022-11-20 RX ADMIN — CALCIUM CARBONATE 1000 MG: 1250 SUSPENSION ORAL at 08:11

## 2022-11-20 RX ADMIN — LEVOTHYROXINE SODIUM 100 MCG: 100 TABLET ORAL at 06:11

## 2022-11-20 RX ADMIN — CALCIUM CARBONATE 1000 MG: 1250 SUSPENSION ORAL at 12:11

## 2022-11-20 RX ADMIN — INSULIN ASPART 2 UNITS: 100 INJECTION, SOLUTION INTRAVENOUS; SUBCUTANEOUS at 07:11

## 2022-11-20 NOTE — PLAN OF CARE
Discharge instructions given, all questions answered. PIVs remove, pt tolerated well. Belongings returned. Meds picked up from pharmacy by wife. Transported off unit via wheelchair, accompanied by wife.

## 2022-11-20 NOTE — PLAN OF CARE
Pt c/o epigastric pain. Pain meds and Tums given with no effect. Pt ambulated in room and had BM, still no decrease in pain. Call placed to ENT.    Call back from Dr. Vergara. Renae ordered.

## 2022-11-20 NOTE — DISCHARGE SUMMARY
Nael [facility] - [facility]  Otorhinolaryngology-Head & Neck Surgery  Discharge Summary      Patient Name: Cyrus Batres Jr.  MRN: [id number]  Admission Date: 11/9/2022  Hospital Length of Stay: 11 days  Discharge Date and Time:  11/20/2022 9:06 AM  Attending Physician: Jesse James MD   Discharging Provider: Samy Abbott MD  Primary Care Provider: Maico Patterson MD    HPI:   No notes on file    Procedure(s) (LRB):  TOTAL PHARYNGECTOMY  TOTAL GLOSSECTOMY (Bilateral)  CREATION, FREE FLAP, ALT (Left)  LARYNGOSCOPY, DIRECT  DISSECTION, NECK (Bilateral)  APPLICATION, GRAFT, SKIN, SPLIT-THICKNESS (Right)      Indwelling Lines/Drains at time of discharge:   Lines/Drains/Airways     Central Venous Catheter Line  Duration           Port A Cath Single Lumen left subclavian -- days          Drain  Duration                Open Drain Right;Anterior Neck Penrose -- days         Gastrostomy/Enterostomy 06/09/22 1935 midline feeding 163 days         Closed/Suction Drain 11/09/22 1518 Left;Anterior Thigh Bulb 19 Fr. 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 Now s/p total glossectomy, pharyngectomy, and ALT free flap and STSG reconstruction on 11/9. He progressed well and he was stepped down to the floor after 48 hours. He was started on continuous tube feeds, which he tolerated well and was transitioned to bolus (his home regimen) prior to discharge. He initially had urinary retention, but his mahan was removed 11/16 and he has been voiding spontaneously since. On the day of discharge, he was ambulating independently and demonstrated ability to care for his stoma and administer his tube  feeds. He will be discharged home with clinic follow up to discuss further adjuvant treatment.    Physical Exam  Constitutional:       General: He is not in acute distress.  HENT:      Head:      Comments: Diffuse facial edema bilaterally but stable, soft to palpation     Mouth/Throat:      Comments: Flap soft to palpation, sutures to FOM intact   Eyes:      Extraocular Movements: Extraocular movements intact.   Neck:      Comments: Trach in place to stoma   Skin graft to midline neck overlying flap, dry  Staples c/d/I on lateral neck, penroses in place bilaterally   Doppler with good venous and arterial signals   Cardiovascular:      Rate and Rhythm: Normal rate and regular rhythm.   Pulmonary:      Effort: No respiratory distress.   Neurological:      Mental Status: He is alert.       Goals of Care Treatment Preferences:  Code Status: Full Code      Consults:   Consults (From admission, onward)        Status Ordering Provider     Inpatient consult to Registered Dietitian/Nutritionist  Once        Provider:  (Not yet assigned)    Completed GONSALO KNOX          Significant Diagnostic Studies: see chart    Pending Diagnostic Studies:     Procedure Component Value Units Date/Time    Specimen to Pathology, Surgery ENT [[id number]] Collected: 11/09/22 1420    Order Status: Sent Lab Status: In process Updated: 11/10/22 0953    Specimen: Tissue         Final Active Diagnoses:    Diagnosis Date Noted POA    PRINCIPAL PROBLEM:  Malignant neoplasm of base of tongue [C01] 06/22/2022 Yes    Tracheostomy in place [Z93.0] 11/16/2022 Not Applicable    Malnutrition of mild degree [E44.1] 11/16/2022 Yes    Open wound of neck [S11.90XA] 11/10/2022 Yes    Open wnd of pharynx [S11.20XA] 11/10/2022 Yes    Open wound of left thigh [S71.102A] 11/10/2022 Yes    Open wound of mouth [S01.502A] 11/10/2022 Yes      Problems Resolved During this Admission:      Discharged Condition: stable    Disposition: Home or Self  Care    Follow Up:   Follow-up Information     Alise Hart MD Follow up on 11/28/2022.    Specialty: Otolaryngology  Contact information:  [address] [facility]  [facility] [address]  [phone]             Jesse James MD Follow up on 11/28/2022.    Specialty: Otolaryngology  Contact information:  El [facility]  [facility] [address]  [phone]                       Patient Instructions:      Lifting restrictions     No dressing needed     Notify your health care provider if you experience any of the following:  temperature >100.4     Notify your health care provider if you experience any of the following:  persistent nausea and vomiting or diarrhea     Notify your health care provider if you experience any of the following:  severe uncontrolled pain     Notify your health care provider if you experience any of the following:  redness, tenderness, or signs of infection (pain, swelling, redness, odor or green/yellow discharge around incision site)     Notify your health care provider if you experience any of the following:  difficulty breathing or increased cough     Notify your health care provider if you experience any of the following:  severe persistent headache     Notify your health care provider if you experience any of the following:  worsening rash     Notify your health care provider if you experience any of the following:  persistent dizziness, light-headedness, or visual disturbances     Notify your health care provider if you experience any of the following:  increased confusion or weakness     Tube Feedings/Formulas     Order Specific Question Answer Comments   Select Adult Formula: Other Jvity   Route: Gastrostomy      Medications:  Reconciled Home Medications:      Medication List      START taking these medications    calcitrioL 1 mcg/mL solution  Commonly known as: ROCALTROL  0.25 mLs (0.25 mcg total) by Per G Tube route once daily.  Start taking on: November 21, 2022     calcium  carbonate 500 mg/5 mL (1,250 mg/5 mL)  10 mLs (1,000 mg total) by Per G Tube route 3 (three) times daily with meals.     hydrocodone-apap 7.5-325 MG/15 ML oral solution  Commonly known as: HYCET  Take 15 mLs by mouth every 6 (six) hours as needed for Pain.        CHANGE how you take these medications    * levothyroxine 100 MCG tablet  Commonly known as: SYNTHROID  Take 1 tablet (100 mcg total) by mouth before breakfast.  What changed: Another medication with the same name was added. Make sure you understand how and when to take each.     * levothyroxine 100 MCG tablet  Commonly known as: SYNTHROID  1 tablet (100 mcg total) by Per G Tube route before breakfast.  Start taking on: November 21, 2022  What changed: You were already taking a medication with the same name, and this prescription was added. Make sure you understand how and when to take each.         * This list has 2 medication(s) that are the same as other medications prescribed for you. Read the directions carefully, and ask your doctor or other care provider to review them with you.            CONTINUE taking these medications    acetaminophen 325 MG tablet  Commonly known as: TYLENOL  Take 325 mg by mouth every 6 (six) hours as needed for Pain.     esomeprazole 40 MG capsule  Commonly known as: NEXIUM  40 mg before breakfast.     ibuprofen 200 MG tablet  Commonly known as: ADVIL,MOTRIN  Take 200 mg by mouth every 6 (six) hours as needed for Pain.     losartan 100 MG tablet  Commonly known as: COZAAR  Take 0.5 tablets (50 mg total) by mouth once daily.     meclizine 25 mg tablet  Commonly known as: ANTIVERT  1 tablet (25 mg total) by Per G Tube route 3 (three) times daily as needed.     metFORMIN 500 MG tablet  Commonly known as: GLUCOPHAGE  Take 1 tablet (500 mg total) by mouth 2 (two) times daily with meals.     opium tincture 10 mg/mL (morphine) Tinc  Take 1 mL (10 mg total) by mouth 4 (four) times daily as needed.     traZODone 50 MG tablet  Commonly  known as: DESYREL  TAKE 1 TABLET BY MOUTH NIGHTLY AS NEEDED FOR INSOMNIA.     zolpidem 5 MG Tab  Commonly known as: AMBIEN  1 tablet (5 mg total) by Per G Tube route nightly as needed (difficult with sleep).          Time spent on the discharge of patient: 30 minutes    Samy Abbott MD  Otorhinolaryngology-Head & Neck Surgery  [facility]

## 2022-11-20 NOTE — PLAN OF CARE
Pt is A&Ox4 injury free. Breathing is regular equal and unlabored bilaterally on 5 liters of 28 % humidified air. Pt did not ask for any pain med's and pain was controlled with scheduled medications. The pt would only allow the nurse to give them on can of feeding and water bolus during the night shift. The pt did have good urine output during the night shift. The pt had refused stool softer at the start of night shift.

## 2022-11-20 NOTE — PLAN OF CARE
POC reviewed with pt, communicated understanding. AAOx4, VSS on RA, mp with TC. Denies pain. Bilat neck incisions HENRY, R thigh skin graft site HENRY, L thigh incision, staples, HENRY. R penrose, GLENN to L thigh, removed.  NPO, impact peptide bolus feeds, pt tolerated. 1 BM for this shift. Pt c/o abdominal pain. PRNs given, simethicone ordered. Encouraged pt to ambulate. ACCU check Q6, coverage per sliding scale. Discharge planned for today.  All needs met, no complaints offered at this time. Bed locked in lowest position, call bell within reach. Frequent rounds for safety.   Problem: Infection  Goal: Absence of Infection Signs and Symptoms  Outcome: Ongoing, Progressing     Problem: Adult Inpatient Plan of Care  Goal: Plan of Care Review  Outcome: Ongoing, Progressing  Goal: Patient-Specific Goal (Individualized)  Outcome: Ongoing, Progressing  Goal: Absence of Hospital-Acquired Illness or Injury  Outcome: Ongoing, Progressing  Goal: Optimal Comfort and Wellbeing  Outcome: Ongoing, Progressing  Goal: Readiness for Transition of Care  Outcome: Ongoing, Progressing     Problem: Diabetes Comorbidity  Goal: Blood Glucose Level Within Targeted Range  Outcome: Ongoing, Progressing     Problem: Laryngectomy  Goal: POD 3 & 4 - Ambulate with assist/OOB TID  Outcome: Ongoing, Progressing     Problem: Skin Injury Risk Increased  Goal: Skin Health and Integrity  Outcome: Ongoing, Progressing     Problem: Fall Injury Risk  Goal: Absence of Fall and Fall-Related Injury  Outcome: Ongoing, Progressing

## 2022-11-21 ENCOUNTER — PATIENT OUTREACH (OUTPATIENT)
Dept: ADMINISTRATIVE | Facility: CLINIC | Age: 72
End: 2022-11-21
Payer: MEDICARE

## 2022-11-21 NOTE — PLAN OF CARE
Orders for home tube feeds:    Please resume home tube feeds of Jevity 1.5, bolus 6 cartons/day.      JODI Pinon, FNP-C  Otolaryngology- Division of Head and Neck Surgical Oncology  [phone]

## 2022-11-22 ENCOUNTER — TELEPHONE (OUTPATIENT)
Dept: OTOLARYNGOLOGY | Facility: CLINIC | Age: 72
End: 2022-11-22
Payer: MEDICARE

## 2022-11-22 ENCOUNTER — PATIENT MESSAGE (OUTPATIENT)
Dept: HEMATOLOGY/ONCOLOGY | Facility: CLINIC | Age: 72
End: 2022-11-22

## 2022-11-22 ENCOUNTER — PATIENT MESSAGE (OUTPATIENT)
Dept: OTOLARYNGOLOGY | Facility: CLINIC | Age: 72
End: 2022-11-22
Payer: MEDICARE

## 2022-11-22 ENCOUNTER — HOSPITAL ENCOUNTER (OUTPATIENT)
Facility: HOSPITAL | Age: 72
Discharge: ANOTHER HOSPITAL | End: 2022-11-23
Attending: EMERGENCY MEDICINE | Admitting: STUDENT IN AN ORGANIZED HEALTH CARE EDUCATION/TRAINING PROGRAM
Payer: MEDICARE

## 2022-11-22 DIAGNOSIS — R74.01 TRANSAMINITIS: ICD-10-CM

## 2022-11-22 DIAGNOSIS — D64.9 ANEMIA, UNSPECIFIED TYPE: ICD-10-CM

## 2022-11-22 DIAGNOSIS — E87.1 HYPONATREMIA: ICD-10-CM

## 2022-11-22 DIAGNOSIS — E11.69 TYPE 2 DIABETES MELLITUS WITH OTHER SPECIFIED COMPLICATION, WITHOUT LONG-TERM CURRENT USE OF INSULIN: ICD-10-CM

## 2022-11-22 DIAGNOSIS — L02.11 ABSCESS OF NECK: ICD-10-CM

## 2022-11-22 DIAGNOSIS — D72.829 LEUKOCYTOSIS, UNSPECIFIED TYPE: ICD-10-CM

## 2022-11-22 DIAGNOSIS — A49.8 PSEUDOMONAS AERUGINOSA INFECTION: ICD-10-CM

## 2022-11-22 DIAGNOSIS — E86.0 DEHYDRATION: ICD-10-CM

## 2022-11-22 DIAGNOSIS — T81.44XS: ICD-10-CM

## 2022-11-22 DIAGNOSIS — C32.9 LARYNGEAL CANCER: ICD-10-CM

## 2022-11-22 DIAGNOSIS — T81.42XA INFECTION OF DEEP INCISIONAL SURGICAL SITE AFTER PROCEDURE, INITIAL ENCOUNTER: Primary | ICD-10-CM

## 2022-11-22 DIAGNOSIS — C32.9 LARYNX CANCER: Primary | ICD-10-CM

## 2022-11-22 DIAGNOSIS — R06.02 SOB (SHORTNESS OF BREATH): ICD-10-CM

## 2022-11-22 PROBLEM — E11.9 TYPE 2 DIABETES MELLITUS, WITHOUT LONG-TERM CURRENT USE OF INSULIN: Status: ACTIVE | Noted: 2022-11-22

## 2022-11-22 PROBLEM — Z43.1 PEG (PERCUTANEOUS ENDOSCOPIC GASTROSTOMY) ADJUSTMENT/REPLACEMENT/REMOVAL: Status: ACTIVE | Noted: 2022-11-22

## 2022-11-22 PROBLEM — I48.91 A-FIB: Status: ACTIVE | Noted: 2022-11-22

## 2022-11-22 PROBLEM — E80.6 HYPERBILIRUBINEMIA: Status: ACTIVE | Noted: 2022-11-22

## 2022-11-22 PROBLEM — I10 HTN (HYPERTENSION): Status: ACTIVE | Noted: 2022-11-22

## 2022-11-22 PROBLEM — E83.51 HYPOCALCEMIA: Status: ACTIVE | Noted: 2022-11-22

## 2022-11-22 PROBLEM — E03.9 HYPOTHYROIDISM: Status: ACTIVE | Noted: 2022-11-22

## 2022-11-22 LAB
ALBUMIN SERPL BCP-MCNC: 3.1 G/DL (ref 3.5–5.2)
ALP SERPL-CCNC: 741 U/L (ref 55–135)
ALT SERPL W/O P-5'-P-CCNC: 233 U/L (ref 10–44)
ANION GAP SERPL CALC-SCNC: 10 MMOL/L (ref 8–16)
AST SERPL-CCNC: 97 U/L (ref 10–40)
BASOPHILS # BLD AUTO: 0.03 K/UL (ref 0–0.2)
BASOPHILS NFR BLD: 0.2 % (ref 0–1.9)
BILIRUB SERPL-MCNC: 4.8 MG/DL (ref 0.1–1)
BUN SERPL-MCNC: 33 MG/DL (ref 8–23)
CA-I BLDV-SCNC: 0.89 MMOL/L (ref 1.06–1.42)
CALCIUM SERPL-MCNC: 7.1 MG/DL (ref 8.7–10.5)
CHLORIDE SERPL-SCNC: 95 MMOL/L (ref 95–110)
CO2 SERPL-SCNC: 23 MMOL/L (ref 23–29)
CREAT SERPL-MCNC: 0.8 MG/DL (ref 0.5–1.4)
CREAT SERPL-MCNC: 0.9 MG/DL (ref 0.5–1.4)
CRP SERPL-MCNC: 22.3 MG/DL
DIFFERENTIAL METHOD: ABNORMAL
EOSINOPHIL # BLD AUTO: 0 K/UL (ref 0–0.5)
EOSINOPHIL NFR BLD: 0.1 % (ref 0–8)
ERYTHROCYTE [DISTWIDTH] IN BLOOD BY AUTOMATED COUNT: 13.7 % (ref 11.5–14.5)
EST. GFR  (NO RACE VARIABLE): >60 ML/MIN/1.73 M^2
FINAL PATHOLOGIC DIAGNOSIS: NORMAL
GLUCOSE SERPL-MCNC: 149 MG/DL (ref 70–110)
GLUCOSE SERPL-MCNC: 149 MG/DL (ref 70–110)
GROSS: NORMAL
HCT VFR BLD AUTO: 28 % (ref 40–54)
HGB BLD-MCNC: 9.3 G/DL (ref 14–18)
IMM GRANULOCYTES # BLD AUTO: 0.15 K/UL (ref 0–0.04)
IMM GRANULOCYTES NFR BLD AUTO: 0.8 % (ref 0–0.5)
LACTATE SERPL-SCNC: 1.2 MMOL/L (ref 0.5–1.9)
LYMPHOCYTES # BLD AUTO: 0.3 K/UL (ref 1–4.8)
LYMPHOCYTES NFR BLD: 1.6 % (ref 18–48)
Lab: NORMAL
MCH RBC QN AUTO: 30.6 PG (ref 27–31)
MCHC RBC AUTO-ENTMCNC: 33.2 G/DL (ref 32–36)
MCV RBC AUTO: 92 FL (ref 82–98)
MONOCYTES # BLD AUTO: 0.3 K/UL (ref 0.3–1)
MONOCYTES NFR BLD: 1.6 % (ref 4–15)
NEUTROPHILS # BLD AUTO: 17.8 K/UL (ref 1.8–7.7)
NEUTROPHILS NFR BLD: 95.7 % (ref 38–73)
NRBC BLD-RTO: 0 /100 WBC
PLATELET # BLD AUTO: 405 K/UL (ref 150–450)
PMV BLD AUTO: 9.4 FL (ref 9.2–12.9)
POTASSIUM SERPL-SCNC: 3.7 MMOL/L (ref 3.5–5.1)
PROT SERPL-MCNC: 6.9 G/DL (ref 6–8.4)
RBC # BLD AUTO: 3.04 M/UL (ref 4.6–6.2)
SAMPLE: NORMAL
SODIUM SERPL-SCNC: 128 MMOL/L (ref 136–145)
TROPONIN I SERPL HS-MCNC: 17.6 PG/ML (ref 0–14.9)
WBC # BLD AUTO: 18.58 K/UL (ref 3.9–12.7)

## 2022-11-22 PROCEDURE — 86140 C-REACTIVE PROTEIN: CPT | Performed by: INTERNAL MEDICINE

## 2022-11-22 PROCEDURE — 84484 ASSAY OF TROPONIN QUANT: CPT | Performed by: EMERGENCY MEDICINE

## 2022-11-22 PROCEDURE — 87186 SC STD MICRODIL/AGAR DIL: CPT | Performed by: STUDENT IN AN ORGANIZED HEALTH CARE EDUCATION/TRAINING PROGRAM

## 2022-11-22 PROCEDURE — 36415 COLL VENOUS BLD VENIPUNCTURE: CPT | Performed by: INTERNAL MEDICINE

## 2022-11-22 PROCEDURE — 93005 ELECTROCARDIOGRAM TRACING: CPT | Performed by: SPECIALIST

## 2022-11-22 PROCEDURE — 93010 EKG 12-LEAD: ICD-10-PCS | Mod: ,,, | Performed by: SPECIALIST

## 2022-11-22 PROCEDURE — 93010 ELECTROCARDIOGRAM REPORT: CPT | Mod: ,,, | Performed by: SPECIALIST

## 2022-11-22 PROCEDURE — 99291 PR CRITICAL CARE, E/M 30-74 MINUTES: ICD-10-PCS | Mod: ,,, | Performed by: INTERNAL MEDICINE

## 2022-11-22 PROCEDURE — 87077 CULTURE AEROBIC IDENTIFY: CPT | Performed by: STUDENT IN AN ORGANIZED HEALTH CARE EDUCATION/TRAINING PROGRAM

## 2022-11-22 PROCEDURE — 96365 THER/PROPH/DIAG IV INF INIT: CPT

## 2022-11-22 PROCEDURE — 25500020 PHARM REV CODE 255: Performed by: EMERGENCY MEDICINE

## 2022-11-22 PROCEDURE — 82947 ASSAY GLUCOSE BLOOD QUANT: CPT | Performed by: INTERNAL MEDICINE

## 2022-11-22 PROCEDURE — 25000003 PHARM REV CODE 250: Performed by: EMERGENCY MEDICINE

## 2022-11-22 PROCEDURE — 96366 THER/PROPH/DIAG IV INF ADDON: CPT

## 2022-11-22 PROCEDURE — 87070 CULTURE OTHR SPECIMN AEROBIC: CPT | Mod: 59 | Performed by: STUDENT IN AN ORGANIZED HEALTH CARE EDUCATION/TRAINING PROGRAM

## 2022-11-22 PROCEDURE — 96375 TX/PRO/DX INJ NEW DRUG ADDON: CPT

## 2022-11-22 PROCEDURE — G0378 HOSPITAL OBSERVATION PER HR: HCPCS

## 2022-11-22 PROCEDURE — 87077 CULTURE AEROBIC IDENTIFY: CPT | Mod: 59 | Performed by: EMERGENCY MEDICINE

## 2022-11-22 PROCEDURE — 94761 N-INVAS EAR/PLS OXIMETRY MLT: CPT

## 2022-11-22 PROCEDURE — 85025 COMPLETE CBC W/AUTO DIFF WBC: CPT | Performed by: EMERGENCY MEDICINE

## 2022-11-22 PROCEDURE — 87040 BLOOD CULTURE FOR BACTERIA: CPT | Mod: 59 | Performed by: EMERGENCY MEDICINE

## 2022-11-22 PROCEDURE — 83605 ASSAY OF LACTIC ACID: CPT | Performed by: EMERGENCY MEDICINE

## 2022-11-22 PROCEDURE — 63600175 PHARM REV CODE 636 W HCPCS: Performed by: EMERGENCY MEDICINE

## 2022-11-22 PROCEDURE — 25000003 PHARM REV CODE 250: Performed by: STUDENT IN AN ORGANIZED HEALTH CARE EDUCATION/TRAINING PROGRAM

## 2022-11-22 PROCEDURE — 82330 ASSAY OF CALCIUM: CPT | Performed by: STUDENT IN AN ORGANIZED HEALTH CARE EDUCATION/TRAINING PROGRAM

## 2022-11-22 PROCEDURE — 96361 HYDRATE IV INFUSION ADD-ON: CPT

## 2022-11-22 PROCEDURE — 99900031 HC PATIENT EDUCATION (STAT)

## 2022-11-22 PROCEDURE — 96367 TX/PROPH/DG ADDL SEQ IV INF: CPT

## 2022-11-22 PROCEDURE — 99291 CRITICAL CARE FIRST HOUR: CPT

## 2022-11-22 PROCEDURE — 27000221 HC OXYGEN, UP TO 24 HOURS

## 2022-11-22 PROCEDURE — 87205 SMEAR GRAM STAIN: CPT | Performed by: STUDENT IN AN ORGANIZED HEALTH CARE EDUCATION/TRAINING PROGRAM

## 2022-11-22 PROCEDURE — 25000003 PHARM REV CODE 250: Performed by: INTERNAL MEDICINE

## 2022-11-22 PROCEDURE — 83036 HEMOGLOBIN GLYCOSYLATED A1C: CPT | Performed by: STUDENT IN AN ORGANIZED HEALTH CARE EDUCATION/TRAINING PROGRAM

## 2022-11-22 PROCEDURE — 80053 COMPREHEN METABOLIC PANEL: CPT | Performed by: EMERGENCY MEDICINE

## 2022-11-22 PROCEDURE — 87070 CULTURE OTHR SPECIMN AEROBIC: CPT | Performed by: STUDENT IN AN ORGANIZED HEALTH CARE EDUCATION/TRAINING PROGRAM

## 2022-11-22 PROCEDURE — 63600175 PHARM REV CODE 636 W HCPCS: Performed by: INTERNAL MEDICINE

## 2022-11-22 PROCEDURE — 87154 CUL TYP ID BLD PTHGN 6+ TRGT: CPT | Performed by: EMERGENCY MEDICINE

## 2022-11-22 PROCEDURE — 87641 MR-STAPH DNA AMP PROBE: CPT | Mod: 59 | Performed by: INTERNAL MEDICINE

## 2022-11-22 PROCEDURE — 99291 CRITICAL CARE FIRST HOUR: CPT | Mod: ,,, | Performed by: INTERNAL MEDICINE

## 2022-11-22 PROCEDURE — 87186 SC STD MICRODIL/AGAR DIL: CPT | Mod: 59 | Performed by: EMERGENCY MEDICINE

## 2022-11-22 PROCEDURE — 99900026 HC AIRWAY MAINTENANCE (STAT)

## 2022-11-22 RX ORDER — VANCOMYCIN HCL IN 5 % DEXTROSE 1G/250ML
1000 PLASTIC BAG, INJECTION (ML) INTRAVENOUS ONCE
Status: COMPLETED | OUTPATIENT
Start: 2022-11-22 | End: 2022-11-22

## 2022-11-22 RX ORDER — MORPHINE SULFATE 2 MG/ML
2 INJECTION, SOLUTION INTRAMUSCULAR; INTRAVENOUS EVERY 4 HOURS PRN
Status: DISCONTINUED | OUTPATIENT
Start: 2022-11-22 | End: 2022-11-23 | Stop reason: HOSPADM

## 2022-11-22 RX ORDER — SODIUM,POTASSIUM PHOSPHATES 280-250MG
2 POWDER IN PACKET (EA) ORAL
Status: DISCONTINUED | OUTPATIENT
Start: 2022-11-22 | End: 2022-11-23 | Stop reason: HOSPADM

## 2022-11-22 RX ORDER — LANOLIN ALCOHOL/MO/W.PET/CERES
800 CREAM (GRAM) TOPICAL
Status: DISCONTINUED | OUTPATIENT
Start: 2022-11-22 | End: 2022-11-23 | Stop reason: HOSPADM

## 2022-11-22 RX ORDER — PANTOPRAZOLE SODIUM 40 MG/10ML
40 INJECTION, POWDER, LYOPHILIZED, FOR SOLUTION INTRAVENOUS DAILY
Status: DISCONTINUED | OUTPATIENT
Start: 2022-11-23 | End: 2022-11-23 | Stop reason: HOSPADM

## 2022-11-22 RX ORDER — IPRATROPIUM BROMIDE AND ALBUTEROL SULFATE 2.5; .5 MG/3ML; MG/3ML
3 SOLUTION RESPIRATORY (INHALATION) EVERY 8 HOURS
Status: DISCONTINUED | OUTPATIENT
Start: 2022-11-23 | End: 2022-11-23 | Stop reason: HOSPADM

## 2022-11-22 RX ORDER — LEVOTHYROXINE SODIUM 100 UG/1
100 TABLET ORAL
Status: DISCONTINUED | OUTPATIENT
Start: 2022-11-23 | End: 2022-11-23 | Stop reason: HOSPADM

## 2022-11-22 RX ORDER — GLUCAGON 1 MG
1 KIT INJECTION
Status: DISCONTINUED | OUTPATIENT
Start: 2022-11-22 | End: 2022-11-23 | Stop reason: HOSPADM

## 2022-11-22 RX ORDER — INSULIN ASPART 100 [IU]/ML
0-5 INJECTION, SOLUTION INTRAVENOUS; SUBCUTANEOUS EVERY 6 HOURS PRN
Status: DISCONTINUED | OUTPATIENT
Start: 2022-11-22 | End: 2022-11-23 | Stop reason: HOSPADM

## 2022-11-22 RX ORDER — SODIUM CHLORIDE 9 MG/ML
1000 INJECTION, SOLUTION INTRAVENOUS
Status: COMPLETED | OUTPATIENT
Start: 2022-11-22 | End: 2022-11-22

## 2022-11-22 RX ORDER — SODIUM CHLORIDE 0.9 % (FLUSH) 0.9 %
10 SYRINGE (ML) INJECTION
Status: DISCONTINUED | OUTPATIENT
Start: 2022-11-22 | End: 2022-11-23 | Stop reason: HOSPADM

## 2022-11-22 RX ORDER — CEFEPIME HYDROCHLORIDE 1 G/50ML
2 INJECTION, SOLUTION INTRAVENOUS ONCE
Status: COMPLETED | OUTPATIENT
Start: 2022-11-22 | End: 2022-11-22

## 2022-11-22 RX ORDER — SODIUM CHLORIDE 9 MG/ML
INJECTION, SOLUTION INTRAVENOUS CONTINUOUS
Status: DISCONTINUED | OUTPATIENT
Start: 2022-11-22 | End: 2022-11-23

## 2022-11-22 RX ORDER — ONDANSETRON 2 MG/ML
4 INJECTION INTRAMUSCULAR; INTRAVENOUS
Status: COMPLETED | OUTPATIENT
Start: 2022-11-22 | End: 2022-11-22

## 2022-11-22 RX ORDER — SODIUM CHLORIDE, SODIUM LACTATE, POTASSIUM CHLORIDE, CALCIUM CHLORIDE 600; 310; 30; 20 MG/100ML; MG/100ML; MG/100ML; MG/100ML
INJECTION, SOLUTION INTRAVENOUS CONTINUOUS
Status: DISCONTINUED | OUTPATIENT
Start: 2022-11-22 | End: 2022-11-22

## 2022-11-22 RX ORDER — HYDRALAZINE HYDROCHLORIDE 20 MG/ML
10 INJECTION INTRAMUSCULAR; INTRAVENOUS EVERY 6 HOURS PRN
Status: DISCONTINUED | OUTPATIENT
Start: 2022-11-22 | End: 2022-11-23 | Stop reason: HOSPADM

## 2022-11-22 RX ORDER — ONDANSETRON 2 MG/ML
4 INJECTION INTRAMUSCULAR; INTRAVENOUS EVERY 8 HOURS PRN
Status: DISCONTINUED | OUTPATIENT
Start: 2022-11-22 | End: 2022-11-23 | Stop reason: HOSPADM

## 2022-11-22 RX ORDER — CEFEPIME HYDROCHLORIDE 1 G/50ML
2 INJECTION, SOLUTION INTRAVENOUS
Status: DISCONTINUED | OUTPATIENT
Start: 2022-11-22 | End: 2022-11-22

## 2022-11-22 RX ORDER — CALCIUM GLUCONATE 20 MG/ML
1 INJECTION, SOLUTION INTRAVENOUS
Status: COMPLETED | OUTPATIENT
Start: 2022-11-22 | End: 2022-11-22

## 2022-11-22 RX ORDER — MORPHINE SULFATE 4 MG/ML
4 INJECTION, SOLUTION INTRAMUSCULAR; INTRAVENOUS
Status: COMPLETED | OUTPATIENT
Start: 2022-11-22 | End: 2022-11-22

## 2022-11-22 RX ORDER — ACETAMINOPHEN 325 MG/1
650 TABLET ORAL EVERY 8 HOURS PRN
Status: DISCONTINUED | OUTPATIENT
Start: 2022-11-22 | End: 2022-11-23 | Stop reason: HOSPADM

## 2022-11-22 RX ADMIN — CALCIUM GLUCONATE 1 G: 20 INJECTION, SOLUTION INTRAVENOUS at 09:11

## 2022-11-22 RX ADMIN — SODIUM CHLORIDE: 0.9 INJECTION, SOLUTION INTRAVENOUS at 08:11

## 2022-11-22 RX ADMIN — MORPHINE SULFATE 2 MG: 4 INJECTION, SOLUTION INTRAMUSCULAR; INTRAVENOUS at 02:11

## 2022-11-22 RX ADMIN — CEFEPIME HYDROCHLORIDE 2 G: 2 INJECTION, SOLUTION INTRAVENOUS at 05:11

## 2022-11-22 RX ADMIN — ONDANSETRON 4 MG: 2 INJECTION INTRAMUSCULAR; INTRAVENOUS at 02:11

## 2022-11-22 RX ADMIN — VANCOMYCIN HYDROCHLORIDE 1000 MG: 1 INJECTION, POWDER, LYOPHILIZED, FOR SOLUTION INTRAVENOUS at 07:11

## 2022-11-22 RX ADMIN — IOHEXOL 100 ML: 350 INJECTION, SOLUTION INTRAVENOUS at 02:11

## 2022-11-22 RX ADMIN — CEFEPIME HYDROCHLORIDE 2 G: 2 INJECTION, SOLUTION INTRAVENOUS at 03:11

## 2022-11-22 RX ADMIN — PIPERACILLIN SODIUM AND TAZOBACTAM SODIUM 3.38 G: 3; .375 INJECTION, POWDER, LYOPHILIZED, FOR SOLUTION INTRAVENOUS at 06:11

## 2022-11-22 RX ADMIN — SODIUM CHLORIDE 1000 ML: 0.9 INJECTION, SOLUTION INTRAVENOUS at 05:11

## 2022-11-22 RX ADMIN — CALCIUM GLUCONATE 1 G: 20 INJECTION, SOLUTION INTRAVENOUS at 10:11

## 2022-11-22 NOTE — CONSULTS
Pulmonary/Critical Care Consult      PATIENT NAME: Cyrus Batres Jr.  MRN: [id number]  TODAY'S DATE: 2022  4:34 PM  ADMIT DATE: 2022  AGE: 72 y.o. : 1950    CONSULT REQUESTED BY: Roseanne Andino MD    REASON FOR CONSULT:   Laryngeal abscess    HPI:  The patient is a 72 year old gentlemen who underwent pharyngectomy and glossectomy with placement of a salivary bypass tube.  He has a cuffed tracheostomy tube in place.  The CT is impressive to my eye, but Dr. James and Karan do no seem worried.  The patient is satting 98% on room air and is in no distress.  Several blood clots have been suctioned from the airway.    CXR is clear.    REVIEW OF SYSTEMS  GENERAL: Feeling short of breath.  EYES: Vision is good.  ENT: His throat is sore.  HEART: No chest pain or palpitations.  LUNGS: He is coughing up secretions.  GI: His PEG tube is fine.  : No dysuria, hesitancy, or nocturia.  SKIN: No lesions or rashes.  MUSCULOSKELETAL: No joint pain or myalgias.  NEURO: No headaches or neuropathy.  LYMPH: No edema or adenopathy.  PSYCH: No anxiety or depression.  ENDO: No weight change.    ALLERGIES  Review of patient's allergies indicates:   Allergen Reactions    Lovastatin Itching       INPATIENT SCHEDULED MEDICATIONS   piperacillin-tazobactam (ZOSYN) IVPB  3.375 g Intravenous Q8H    sodium chloride 0.9%  1,000 mL Intravenous Once      lactated ringers         MEDICAL AND SURGICAL HISTORY  History reviewed. No pertinent past medical history.  History reviewed. No pertinent surgical history.  See other chart.    ALCOHOL, TOBACCO AND DRUG USE  Social History     Tobacco Use   Smoking Status Not on file   Smokeless Tobacco Not on file     Social History     Substance and Sexual Activity   Alcohol Use None     Social History     Substance and Sexual Activity   Drug Use Not on file       FAMILY HISTORY  History reviewed. No pertinent family history.    VITAL SIGNS (MOST RECENT)  Temp: 98.1 °F (36.7 °C) (22  0124)  Pulse: 89 (11/22/22 1530)  Resp: 17 (11/22/22 1530)  BP: 109/66 (11/22/22 1530)  SpO2: 99 % (11/22/22 1530)    INTAKE AND OUTPUT (LAST 24 HOURS):  Intake/Output Summary (Last 24 hours) at 11/22/2022 1712  Last data filed at 11/22/2022 1000  Gross per 24 hour   Intake 1350 ml   Output --   Net 1350 ml       WEIGHT  Wt Readings from Last 1 Encounters:   11/22/22 63.5 kg (140 lb)       PHYSICAL EXAM  GENERAL: Older patient in no distress.  HEENT: Pupils equal and reactive. Extraocular movements intact. Nose intact. Pharynx moist.  NECK: Supple. Tracheostomy tube in place.  Radiation changes to R neck.  Flap to L neck.    HEART: Regular rate and rhythm. No murmur or gallop auscultated.  LUNGS: Clear to auscultation and percussion. Lung excursion symmetrical. No change in fremitus. No adventitial noises.  ABDOMEN: PEG tube in place. Bowel sounds present. Non-tender, no masses palpated.  : Normal anatomy.  EXTREMITIES: Normal muscle tone and joint movement, no cyanosis or clubbing. Healing graft site.  LYMPHATICS: No adenopathy palpated, no edema.  SKIN: Dry, intact, no lesions.   NEURO: Cranial nerves II-XII intact. Motor strength 5/5 bilaterally, upper and lower extremities.  PSYCH: Appropriate affect    ACUTE PHASE REACTANT (LAST 24 HOURS)  No results for input(s): FERRITIN, CRP, LDH, DDIMER in the last 24 hours.    CBC LAST (LAST 24 HOURS)  Recent Labs   Lab 11/22/22 0225   WBC 18.58*   RBC 3.04*   HGB 9.3*   HCT 28.0*   MCV 92   MCH 30.6   MCHC 33.2   RDW 13.7      MPV 9.4   GRAN 95.7*  17.8*   LYMPH 1.6*  0.3*   MONO 1.6*  0.3   BASO 0.03   NRBC 0       CHEMISTRY LAST (LAST 24 HOURS)  Recent Labs   Lab 11/22/22 0225   *   K 3.7   CL 95   CO2 23   ANIONGAP 10   BUN 33*   CREATININE 0.8   *   CALCIUM 7.1*   ALBUMIN 3.1*   PROT 6.9   ALKPHOS 741*   *   AST 97*   BILITOT 4.8*         CARDIAC PROFILE (LAST 24 HOURS)  Recent Labs   Lab 11/22/22  0225   TROPONINIHS 17.6*       LAST  7 DAYS MICROBIOLOGY   Microbiology Results (last 7 days)       Procedure Component Value Units Date/Time    MRSA Screen by PCR [[id number]]     Order Status: No result Specimen: Nasopharyngeal Swab from Nasal     Blood Culture #2 **CANNOT BE ORDERED STAT** [[id number]] Collected: 11/22/22 0557    Order Status: Completed Specimen: Blood from Peripheral, Antecubital, Left Updated: 11/22/22 1317     Blood Culture, Routine No Growth to date    Blood Culture #1 **CANNOT BE ORDERED STAT** [[id number]] Collected: 11/22/22 0557    Order Status: Completed Specimen: Blood from Peripheral, Hand, Right Updated: 11/22/22 1317     Blood Culture, Routine No Growth to date            MOST RECENT IMAGING  X-Ray Chest AP Portable  Portable chest x-ray at 2:25 AM    Clinical history shortness of breath    There is a tracheostomy tube in place. There is a left subclavian vein Port-A-Cath.  The cardiomediastinal silhouette is normal in size. The lungs are clear. There are no acute osseous abnormalities.    IMPRESSION: No acute pulmonary process    Electronically signed by:  Shira Menendez MD  11/22/2022 8:21 AM [facility] Workstation: [id number]  CT Soft Tissue Neck With Contrast  EXAM DESCRIPTION:  CT SOFT TISSUE NECK WITH CONTRAST    CLINICAL HISTORY:  72 years  Male  Soft tissue swelling, infection suspected, neck xray done; Neck mass, nonpulsatile; Head/neck cancer, assess treatment response    TECHNIQUE:  Contrast enhanced CT neck soft tissue with coronal and sagittal reformats. All CT scans at this facility use dose modulation, iterative reconstruction, and/or weight based dosing when appropriate to reduce radiation dose to as low as reasonably achievable.    COMPARISON: None    FINDINGS:    Extensive postsurgical changes in the neck.    Irregular fluid collection with gas in the submandibular space at the level just posterior to the mandibular symphysis measuring approximately 4.7 x 1.1 x 1.3 cm (TV x AP x SI). There are foci of  gas    There is fluid in the anterior soft tissues of the neck on the right extending from the level of C5 C6-7 T1 just above the tracheostomy tube that measures approximately 4.9 x 2.3 x 3.9 cm (TV x AP x SI). There is curvilinear density located behind this collection on the right.    Diffuse inflammatory changes with soft tissue gas at the level of the tracheostomy tube insertion to the left    There is a tubing in the esophagus that contains small amount of fluid in its superior aspect.    Diffuse soft tissue swelling and inflammatory changes to the neck on both sides.    Atherosclerosis of the carotid vessels.  Multilevel degenerative changes.  Left greater than right opacification of the bilateral maxillary sinuses with air-fluid levels on the left. Mild ethmoid sinus mucosal thickening. Minimal air-fluid levels in the bilateral sphenoid sinuses.    IMPRESSION:  1. Irregular fluid collection in the submandibular space as described above suspicious for phlegmon / abscess.  2. Irregular fluid collection above the tracheostomy tube as described above may represent combination of hematoma, phlegmon and developing abscess. Indeterminate curvilinear density behind this collection on the right and not within the collection.    THIS REPORT CONTAINS FINDINGS THAT MAY BE CRITICAL TO PATIENT CARE: The findings were verbally discussed via telephone conference with Anne Jacobs MD on 11/22/2022 at 5:21 AM CST.    Electronically signed by:  Segundo Lagos MD  11/22/2022 5:25 AM CST Workstation: [id number]      CURRENT VISIT EKG  Results for orders placed or performed during the hospital encounter of 11/22/22   EKG 12-lead    Narrative    Test Reason : R06.02,    Vent. Rate : 108 BPM     Atrial Rate : 108 BPM     P-R Int : 132 ms          QRS Dur : 076 ms      QT Int : 336 ms       P-R-T Axes : 008 -06 026 degrees     QTc Int : 450 ms    Sinus tachycardia  Otherwise normal ECG  No previous ECGs available    Referred  By: AAAREFERR   SELF           Confirmed By:        ECHOCARDIOGRAM RESULTS  No results found for this or any previous visit.        VENTILATOR INFORMATION  Oxygen Concentration (%):  [21-35] 21       LAST ARTERIAL BLOOD GAS  ABG  No results for input(s): PH, PO2, PCO2, HCO3, BE in the last 168 hours.    IMPRESSION AND PLAN  Laryngeal abscess and hematoma in a patient s/p pharynectomy and glossectomy with flap and salivary bypass.    Patient is tachypneic  Leukocytosis with hard left shift  Anemia, likely some acute, some chronic disease  Hyponatremia  Hyperglycemia  Hyperbilirubinemia, etiology unclear  Transaminitis    Will keep in ICU overnight, does not appear critical but will watch airway  Humidify airway  Zosyn for abscess  MRSA swab  Dr. Russo to see patient in am  Dr. James happy to take patient back when bed is available  Patient has a cuffed trach if needed  Enteral nutrition  Consult ID, have spoken with Dr. Porter  LR at 100cc/hr  Trend labs  Holding enoxaparin with hematoma  SCD's  Protonix for GI prophylaxis      Critical care time >35 minutes.    Chloé Camp MD  [facility]  Department of Pulmonology  Date of Service: 11/22/2022  4:34 PM

## 2022-11-22 NOTE — ED NOTES
Spoke with transfer center and updated family and patient. We are waiting on ED diversion to clear.

## 2022-11-22 NOTE — PLAN OF CARE
SW received message from NP that opt d.c with out trach supplies on 11/12. NP placed trach supply order. SW sent to [facility] for review.    Pam Webber LCSW  Case Management/[facility]  [phone]

## 2022-11-22 NOTE — HPI
A 72-year-old  male with history of hypertension, hypothyroidism, diabetes type 2, GERD , laryngeal cancer and malignant neoplasm of base of tongue  who underwent a total glossectomy pharyngectomy with lateral thigh reconstruction and skin graft on 11/09/2022 at [facility] .  He  presents to the ED on account of shortness of breath and heavy mucus production.    Patient is awake and alert but currently nonverbal.  There is no family in the room to give the history.  Per ED physician's note, patient was discharged home 2 days ago from [facility].  Patient has a laryngectomy tube in place and patient is not on home oxygen.  Wife was the historian and states that since arrival at home from the hospital, patient has been breathing heavy and patient felt short of breath. He has also been coughing, choking and vomited twice last night. there was also associated coughing with production of bloody mucus.  Patient denies any chest pain, neck or throat pain, fevers chills or sweats or  drainage from incision sites.  Patient was worked for transfer back to [facility] however due to diversion patient would be admitted to [facility] pending acceptance.  On presentation to the ED, CT imaging showed Irregular fluid collection in the submandibular space as described above suspicious for phlegmon / abscess.  WBC elevated at 18.58, sodium 128, calcium 7.1, bilirubin 4.8, AST/ALT 9.7/233.  Lactate 1.2.  ENT Dr. Russo was consulted and evaluated the case and stated that while there is certainly dramatic postsurgical change with the large flap in place and air subcutaneously which could very much be postsurgical or even represent fistula, it is not definitive for abscess.  He also stated that he did not see an indication open his neck at the moment as there is no indication of any airway compromise with a longstanding trachea stoma in place with a laryngectomy tube in good position.  Patient was given IV antibiotics in the ED, pulmonologist  has been consulted.      The patient is a 72 y.o M with  He progressed well and he was stepped down to the floor after 48 hours. He was started on continuous tube feeds, which he tolerated well and was transitioned to bolus (his home regimen) prior to discharge. He initially had urinary retention, but his mahan was removed 11/16 and he has been voiding spontaneously since. On the day of discharge, he was ambulating independently and demonstrated ability to care for his stoma and administer his tube feeds. He will be discharged home with clinic follow up to discuss further adjuvant treatment.

## 2022-11-22 NOTE — ED PROVIDER NOTES
Encounter Date: 11/22/2022       History     Chief Complaint   Patient presents with    Shortness of Breath     Started last night, feel like he is working hard to breathe. Pt has stoma. 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  Discharged home yesterday from [facility].  Patient has a laryngectomy tube in place patient is not normally on home oxygen.  Patient answers with yes/no questions doing to being unable to phonate.  Wife helps give history.  She reports since going home yesterday patient has been breathing heavy and patient reports he is felt short of breath he reports he was mildly short of breath at discharge.  Patient reports he is also been coughing and choking.  Vomited twice tonight..   Tonight when he was trying to go to sleep wife reports that he was breathing heavily and was short of breath.  She did not have oxygen available.  Patient was also coughing and had some bloody mucus.  Patient denies any chest pain.  He reports no neck or throat pain.  They deny any fevers chills or sweats.  No drainage from incision sites  Patient also has history of hypertension, paroxysmal AFib, hyperlipidemia, nonsustained V-tach, diabetes type 2, GERD        Review of patient's allergies indicates:   Allergen Reactions    Lovastatin Itching     History reviewed. No pertinent past medical history.  History reviewed. No pertinent surgical history.  History reviewed. No pertinent family history.     Review of Systems   Constitutional:  Negative for activity change, appetite change, chills, diaphoresis, fatigue and fever.   HENT:  Positive for drooling. Negative for congestion, rhinorrhea and sore throat.    Respiratory:  Positive for cough, choking and shortness of breath. Negative for chest tightness and wheezing.    Cardiovascular:   Negative for chest pain and palpitations.   Gastrointestinal:  Positive for nausea and vomiting. Negative for abdominal pain, constipation and diarrhea.   Genitourinary:  Negative for dysuria, frequency and urgency.   Musculoskeletal:  Negative for neck pain and neck stiffness.   Neurological:  Negative for dizziness, weakness, light-headedness, numbness and headaches.   All other systems reviewed and are negative.    Physical Exam     Initial Vitals [11/22/22 0124]   BP Pulse Resp Temp SpO2   113/74 (!) 113 20 98.1 °F (36.7 °C) 100 %      MAP       --         Physical Exam    Nursing note and vitals reviewed.  Constitutional: He appears well-developed and well-nourished. He is not diaphoretic. No distress.   HENT:   Head: Normocephalic and atraumatic.   Right Ear: External ear normal.   Left Ear: External ear normal.   Nose: Nose normal.   Mouth/Throat: Oropharynx is clear and moist.   Eyes: Conjunctivae and EOM are normal. Pupils are equal, round, and reactive to light.   Neck: Neck supple. No stridor present. No tracheal tenderness present. No tracheal deviation present.       Cardiovascular:  Regular rhythm, normal heart sounds and intact distal pulses.     Exam reveals no gallop and no friction rub.       No murmur heard.  Heart rate 108 blood pressure 105/63   Pulmonary/Chest: Breath sounds normal. No stridor. No respiratory distress. He has no wheezes. He has no rhonchi. He has no rales. He exhibits no tenderness.   Sats 90% on room air patient is ventilated through laryngoscopy tube.  Clear breath sounds bilaterally.  Two small areas of bloody mucus on the patient's gown.  No signs of yellow green sputum.  No respiratory distress   Abdominal: Abdomen is soft. Bowel sounds are normal. He exhibits no distension and no mass. There is no abdominal tenderness. There is no rebound and no guarding.   Musculoskeletal:         General: No edema.      Cervical back: Neck supple. Edema present. No erythema or rigidity.  No spinous process tenderness or muscular tenderness. Decreased range of motion.     Lymphadenopathy:     He has no cervical adenopathy.   Neurological: He is alert and oriented to person, place, and time. He has normal strength. No cranial nerve deficit or sensory deficit.   Skin: Skin is warm and dry. No rash noted. No erythema. No pallor.   Psychiatric: He has a normal mood and affect. His behavior is normal. Judgment and thought content normal.       ED Course   Procedures  Labs Reviewed   CBC W/ AUTO DIFFERENTIAL - Abnormal; Notable for the following components:       Result Value    WBC 18.58 (*)     RBC 3.04 (*)     Hemoglobin 9.3 (*)     Hematocrit 28.0 (*)     Immature Granulocytes 0.8 (*)     Gran # (ANC) 17.8 (*)     Immature Grans (Abs) 0.15 (*)     Lymph # 0.3 (*)     Gran % 95.7 (*)     Lymph % 1.6 (*)     Mono % 1.6 (*)     All other components within normal limits   COMPREHENSIVE METABOLIC PANEL - Abnormal; Notable for the following components:    Sodium 128 (*)     Glucose 149 (*)     BUN 33 (*)     Calcium 7.1 (*)     Albumin 3.1 (*)     Total Bilirubin 4.8 (*)     Alkaline Phosphatase 741 (*)     AST 97 (*)      (*)     All other components within normal limits   TROPONIN I HIGH SENSITIVITY - Abnormal; Notable for the following components:    Troponin I High Sensitivity 17.6 (*)     All other components within normal limits   CULTURE, BLOOD   CULTURE, BLOOD   LACTIC ACID, PLASMA   ISTAT CREATININE   POCT CREATININE     EKG Readings: (Independently Interpreted)   Initial Reading: No STEMI. Rhythm: Sinus Tachycardia. Heart Rate: 108. Ectopy: No Ectopy. Conduction: Normal.   ECG Results              EKG 12-lead (In process)  Result time 11/22/22 05:20:20      In process by Interface, Lab In [facility] (11/22/22 05:20:20)                   Narrative:    Test Reason : R06.02,    Vent. Rate : 108 BPM     Atrial Rate : 108 BPM     P-R Int : 132 ms          QRS Dur : 076 ms      QT Int : 336 ms        P-R-T Axes : 008 -06 026 degrees     QTc Int : 450 ms    Sinus tachycardia  Otherwise normal ECG  No previous ECGs available    Referred By: [email or web address]   SELF           Confirmed By:                                   Imaging Results              CT Soft Tissue Neck With Contrast (Final result)  Result time 11/22/22 05:25:50      Final result by Segundo Lagos MD (11/22/22 05:25:50)                   Narrative:    EXAM DESCRIPTION:  CT SOFT TISSUE NECK WITH CONTRAST    CLINICAL HISTORY:  72 years  Male  Soft tissue swelling, infection suspected, neck xray done; Neck mass, nonpulsatile; Head/neck cancer, assess treatment response    TECHNIQUE:  Contrast enhanced CT neck soft tissue with coronal and sagittal reformats. All CT scans at this facility use dose modulation, iterative reconstruction, and/or weight based dosing when appropriate to reduce radiation dose to as low as reasonably achievable.    COMPARISON: None    FINDINGS:    Extensive postsurgical changes in the neck.    Irregular fluid collection with gas in the submandibular space at the level just posterior to the mandibular symphysis measuring approximately 4.7 x 1.1 x 1.3 cm (TV x AP x SI). There are foci of gas    There is fluid in the anterior soft tissues of the neck on the right extending from the level of C5 C6-7 T1 just above the tracheostomy tube that measures approximately 4.9 x 2.3 x 3.9 cm (TV x AP x SI). There is curvilinear density located behind this collection on the right.    Diffuse inflammatory changes with soft tissue gas at the level of the tracheostomy tube insertion to the left    There is a tubing in the esophagus that contains small amount of fluid in its superior aspect.    Diffuse soft tissue swelling and inflammatory changes to the neck on both sides.    Atherosclerosis of the carotid vessels.  Multilevel degenerative changes.  Left greater than right opacification of the bilateral maxillary sinuses with air-fluid levels on the  left. Mild ethmoid sinus mucosal thickening. Minimal air-fluid levels in the bilateral sphenoid sinuses.    IMPRESSION:  1. Irregular fluid collection in the submandibular space as described above suspicious for phlegmon / abscess.  2. Irregular fluid collection above the tracheostomy tube as described above may represent combination of hematoma, phlegmon and developing abscess. Indeterminate curvilinear density behind this collection on the right and not within the collection.    THIS REPORT CONTAINS FINDINGS THAT MAY BE CRITICAL TO PATIENT CARE: The findings were verbally discussed via telephone conference with Anne Jacobs MD on 11/22/2022 at 5:21 AM CST.    Electronically signed by:  Segundo Lagos MD  11/22/2022 5:25 AM CST Workstation: [id number]                                     X-Ray Chest AP Portable (In process)                   X-Rays:   Independently Interpreted Readings:   Chest X-Ray: Normal heart size.  No infiltrates.  No acute abnormalities. Laryngoscopy tube in place no signs of dislodgement   Medications   cefepime in dextrose 5 % IVPB 2 g (2 g Intravenous Not Given 11/22/22 0630)   vancomycin - pharmacy to dose (has no administration in time range)   vancomycin in dextrose 5 % 1 gram/250 mL IVPB 1,000 mg (has no administration in time range)   cefepime in dextrose 5 % IVPB 2 g (2 g Intravenous New Bag 11/22/22 0558)   sodium chloride 0.9% bolus 1,000 mL (1,000 mLs Intravenous Not Given 11/22/22 0700)   iohexoL (OMNIPAQUE 350) injection 100 mL (100 mLs Intravenous Given 11/22/22 0241)   0.9%  NaCl infusion (1,000 mLs Intravenous New Bag 11/22/22 0559)     Medical Decision Making:   Independently Interpreted Test(s):   I have ordered and independently interpreted X-rays - see prior notes.  I have ordered and independently interpreted EKG Reading(s) - see prior notes  Clinical Tests:   Lab Tests: Ordered and Reviewed       <> Summary of Lab: White count 18.58 H&H 9.3 and 28 ANC 17.8  platelets [id number]  Sodium 128 glucose 149 BUN 33 calcium  7.1 albumin 3.1  Bili 4.8 alk-phos 741 AST 97   Troponin 17.6 at 2:25 a.m.    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  Discharged home yesterday from [facility].  Patient has a laryngectomy tube in place patient is not normally on home oxygen.  Patient answers with yes/no questions doing to being unable to phonate.  Wife helps give history.  She reports since going home yesterday patient has been breathing heavy and patient reports he is felt short of breath he reports he was mildly short of breath at discharge.  Patient reports he is also been coughing and choking.  Vomited twice tonight..   Tonight when he was trying to go to sleep wife reports that he was breathing heavily and was short of breath.  She did not have oxygen available.  Patient was also coughing and had some bloody mucus.  Patient denies any chest pain.  He reports no neck or throat pain.  They deny any fevers chills or sweats.  No drainage from incision sites  Patient also has history of hypertension, paroxysmal AFib, hyperlipidemia, nonsustained V-tach, diabetes type 2, GERD  On physical exam the neck has mild swelling there staples to the bilateral lateral neck and a flap with crusting anteriorly and above the laryngoscopy.  Laryngoscopy tube in place.  No signs infection at insertion site.  Sats are 98% on room air 100% on trach collar.  Patient coughed up some bloody mucus just prior to my arrival.  Respiratory staff was going to suction the patient the patient now is in no respiratory distress and does not feel short of breath.  Respirations of 20.  Clear breath sounds bilaterally.  Oral pharyngeal exam patient  has trismus.  No visible signs of developing infection in the oral cavity.  Mucous and emesis in the nose..  Patient's trach collar is stuck to the eschar and several areas this will be cleaned by myself and nursing staff and trach collar will be replaced due to concern for developing infection if this has saliva or emesis on the trach collar.  Chest x-ray and CT of the soft tissues of the neck are pending CBC CMP and plan of care creatinine prior to CT.  Anne Jacobs M.D.  3:04 AM 11/22/2022    Labs returned patient had an elevated white count 18.5 with an ANC of 17.8.  This is new when compared to lab work done on the 16th of November when white count was 7.19.  Patient also was anemic at 9.3 with a hematocrit of 28.  This is increased from 7.6 and 23.8.  Patient also had a elevated BUN today was 33 which is increased from 20/4 on November 16th.  Patient also has an new hyponatremia with sodium 128, calcium 7.1.  Patient also has a newly elevated bilirubin to 4.8 alk-phos of 741 AST of 97 and ALT of 233.  On labs done on November 16th he had LFT at that time with a alk-phos of 59 AST of 11 ALT of 8.  Unsure of the cause of patient's acute transaminitis.  The hyponatremia and signs of dehydration will be treated with normal saline IV bolus.  Patient was not originally given antibiotics as he had no fevers chills sweats or pain in the neck area.  There was  mild erythema at the borders of the surgical site bilaterally with swelling but no signs of abscess formation and no drainage.  Area had been cleaned with normal saline the very dirty trach collar had been removed and I have placed sterile not adherent dressings and a new trach collar on the patient.  Patient had no further episodes of difficulty breathing but reported he continued to feel short of breath reclined in bed.  CT scan of the soft tissues of the neck was completed and revealed an irregular fluid collection in the submandibular space with gas  suspicious for phlegmon versus abscess measuring 4.7 x 1.1 x 1.3 cm.  There is also a fluid collection above the tracheostomy tube that may represent hematoma, phlegmon or developing abscess measuring 4.9 x 2.3 x 3.9 cm.  Diffuse inflammatory changes and soft tissue gas around the tracheostomy tube insertion site to the left.  There is diffuse soft tissue swelling and inflammatory changes in the neck on both sides as well.  This was discussed with the radiologist.  Patient was then placed on vancomycin cefepime IV.  Blood cultures were drawn 1st as well as a lactate I consult with [facility] to the ENT service for transfer to their facility due to patient recently having a laryngectomy, glossectomy and having reconstructive surgery there in the past only having been discharged yesterday with now developing complex fluid collections in the postsurgical site.  Patient does not have any airway compromise currently.  He did have 1 choking spell and was suction.  Sats returned to 99%.  I have been informed the patient be transferred ED to ED.  Anne Jacobs M.D.  6:00 AM 11/22/2022    Other:   I discussed test(s) with the performing physician.       <> Summary of the Findings: Dr. Lagos- radiologist about CT softtissue neck   I have discussed this case with another health care provider.          Attending Attestation:         Attending Critical Care:   Critical Care Times:   Direct Patient Care (initial evaluation, reassessments, and time considering the case)................................................................10 minutes.   Additional History from reviewing old medical records or taking additional history from the family, EMS, PCP, etc.......................5 minutes.   Ordering, Reviewing, and Interpreting Diagnostic Studies...............................................................................................................5 minutes.    Documentation..................................................................................................................................................................................10 minutes.   Consultation with other Physicians. .................................................................................................................................................5 minutes.   Consultation with the patient's family directly relating to the patient's condition, care, and DNR status (when patient unable)......5 minutes.   ==============================================================  Total Critical Care Time - exclusive of procedural time: 40 minutes.  ==============================================================  Critical care was necessary to treat or prevent imminent or life-threatening deterioration of the following conditions: sepsis and airway compromise.   The following critical care procedures were done by me (see procedure notes): airway management.   Critical care was time spent personally by me on the following activities: obtaining history from patient or relative, examination of patient, review of old charts, ordering lab, x-rays, and/or EKG, development of treatment plan with patient or relative, ordering and performing treatments and interventions, evaluation of patient's response to treatment, discussions with primary provider, discussion with consultants, interpretation of cardiac measurements and re-evaluation of patient's conition.   Critical Care Condition: life-threatening                      Clinical Impression:   Final diagnoses:  [R06.02] SOB (shortness of breath)  [D64.9] Anemia, unspecified type  [R74.01] Transaminitis  [E87.1] Hyponatremia  [D72.829] Leukocytosis, unspecified type  [E86.0] Dehydration  [T81.42XA] Infection of deep incisional surgical site after procedure, initial encounter (Primary)  [L02.11] Abscess of neck        ED Disposition Condition    Transfer  to Another Facility Stable                Anne Jacobs MD  11/22/22 0601

## 2022-11-23 ENCOUNTER — HOSPITAL ENCOUNTER (INPATIENT)
Facility: HOSPITAL | Age: 72
LOS: 20 days | Discharge: HOME HEALTH | DRG: 315 | End: 2022-12-13
Attending: OTOLARYNGOLOGY | Admitting: OTOLARYNGOLOGY
Payer: MEDICARE

## 2022-11-23 ENCOUNTER — TELEPHONE (OUTPATIENT)
Dept: OTOLARYNGOLOGY | Facility: CLINIC | Age: 72
End: 2022-11-23

## 2022-11-23 VITALS
BODY MASS INDEX: 22.08 KG/M2 | DIASTOLIC BLOOD PRESSURE: 68 MMHG | HEIGHT: 66 IN | OXYGEN SATURATION: 100 % | RESPIRATION RATE: 18 BRPM | SYSTOLIC BLOOD PRESSURE: 115 MMHG | HEART RATE: 86 BPM | TEMPERATURE: 98 F | WEIGHT: 137.38 LBS

## 2022-11-23 DIAGNOSIS — T81.9XXA POST-OPERATIVE COMPLICATION: ICD-10-CM

## 2022-11-23 DIAGNOSIS — L02.91 ABSCESS: ICD-10-CM

## 2022-11-23 DIAGNOSIS — B49 FUNGEMIA: ICD-10-CM

## 2022-11-23 DIAGNOSIS — I50.9 CHF (CONGESTIVE HEART FAILURE): ICD-10-CM

## 2022-11-23 DIAGNOSIS — R78.81 BACTEREMIA: ICD-10-CM

## 2022-11-23 DIAGNOSIS — C32.9 LARYNGEAL CANCER: Primary | ICD-10-CM

## 2022-11-23 PROBLEM — L02.11 ABSCESS OF NECK: Status: ACTIVE | Noted: 2022-11-23

## 2022-11-23 PROBLEM — A49.8 PSEUDOMONAS AERUGINOSA INFECTION: Status: ACTIVE | Noted: 2022-11-23

## 2022-11-23 PROBLEM — T81.42XA INFECTION OF DEEP INCISIONAL SURGICAL SITE AFTER PROCEDURE: Status: ACTIVE | Noted: 2022-11-23

## 2022-11-23 PROBLEM — J39.2 PHARYNGOCUTANEOUS FISTULA: Status: ACTIVE | Noted: 2022-11-23

## 2022-11-23 PROBLEM — D72.829 LEUKOCYTOSIS: Status: ACTIVE | Noted: 2022-11-23

## 2022-11-23 PROBLEM — E86.0 DEHYDRATION: Status: ACTIVE | Noted: 2022-11-23

## 2022-11-23 PROBLEM — T81.31XA WOUND DEHISCENCE, SURGICAL, INITIAL ENCOUNTER: Status: ACTIVE | Noted: 2022-11-23

## 2022-11-23 PROBLEM — T81.44XA SEPSIS FOLLOWING PROCEDURE: Status: ACTIVE | Noted: 2022-11-23

## 2022-11-23 PROBLEM — E46 PROTEIN MALNUTRITION: Status: ACTIVE | Noted: 2022-11-23

## 2022-11-23 LAB
ACINETOBACTER CALCOACETICUS/BAUMANNII COMPLEX: NOT DETECTED
ACINETOBACTER CALCOACETICUS/BAUMANNII COMPLEX: NOT DETECTED
ALBUMIN SERPL BCP-MCNC: 2 G/DL (ref 3.5–5.2)
ALBUMIN SERPL BCP-MCNC: 2.4 G/DL (ref 3.5–5.2)
ALP SERPL-CCNC: 1111 U/L (ref 55–135)
ALP SERPL-CCNC: 767 U/L (ref 55–135)
ALT SERPL W/O P-5'-P-CCNC: 152 U/L (ref 10–44)
ALT SERPL W/O P-5'-P-CCNC: 154 U/L (ref 10–44)
ANION GAP SERPL CALC-SCNC: 10 MMOL/L (ref 8–16)
ANION GAP SERPL CALC-SCNC: 10 MMOL/L (ref 8–16)
ANION GAP SERPL CALC-SCNC: 9 MMOL/L (ref 8–16)
APTT BLDCRRT: 29.6 SEC (ref 21–32)
AST SERPL-CCNC: 108 U/L (ref 10–40)
AST SERPL-CCNC: 82 U/L (ref 10–40)
BACTEROIDES FRAGILIS: NOT DETECTED
BACTEROIDES FRAGILIS: NOT DETECTED
BASOPHILS # BLD AUTO: 0.01 K/UL (ref 0–0.2)
BASOPHILS # BLD AUTO: 0.02 K/UL (ref 0–0.2)
BASOPHILS NFR BLD: 0.1 % (ref 0–1.9)
BASOPHILS NFR BLD: 0.2 % (ref 0–1.9)
BILIRUB SERPL-MCNC: 5 MG/DL (ref 0.1–1)
BILIRUB SERPL-MCNC: 5.3 MG/DL (ref 0.1–1)
BUN SERPL-MCNC: 19 MG/DL (ref 8–23)
BUN SERPL-MCNC: 20 MG/DL (ref 8–23)
BUN SERPL-MCNC: 24 MG/DL (ref 8–23)
CA-I BLDV-SCNC: 0.93 MMOL/L (ref 1.06–1.42)
CA-I BLDV-SCNC: 0.96 MMOL/L (ref 1.06–1.42)
CA-I BLDV-SCNC: 0.98 MMOL/L (ref 1.06–1.42)
CALCIUM SERPL-MCNC: 6.8 MG/DL (ref 8.7–10.5)
CALCIUM SERPL-MCNC: 7 MG/DL (ref 8.7–10.5)
CALCIUM SERPL-MCNC: 7.6 MG/DL (ref 8.7–10.5)
CANDIDA ALBICANS: NOT DETECTED
CANDIDA ALBICANS: NOT DETECTED
CANDIDA AURIS: NOT DETECTED
CANDIDA AURIS: NOT DETECTED
CANDIDA GLABRATA: DETECTED
CANDIDA GLABRATA: NOT DETECTED
CANDIDA KRUSEI: NOT DETECTED
CANDIDA KRUSEI: NOT DETECTED
CANDIDA PARAPSILOSIS: NOT DETECTED
CANDIDA PARAPSILOSIS: NOT DETECTED
CANDIDA TROPICALIS: NOT DETECTED
CANDIDA TROPICALIS: NOT DETECTED
CHLORIDE SERPL-SCNC: 100 MMOL/L (ref 95–110)
CHLORIDE SERPL-SCNC: 102 MMOL/L (ref 95–110)
CHLORIDE SERPL-SCNC: 98 MMOL/L (ref 95–110)
CO2 SERPL-SCNC: 21 MMOL/L (ref 23–29)
CO2 SERPL-SCNC: 22 MMOL/L (ref 23–29)
CO2 SERPL-SCNC: 22 MMOL/L (ref 23–29)
CREAT SERPL-MCNC: 0.6 MG/DL (ref 0.5–1.4)
CREAT SERPL-MCNC: 0.7 MG/DL (ref 0.5–1.4)
CREAT SERPL-MCNC: 0.7 MG/DL (ref 0.5–1.4)
CRP SERPL-MCNC: 21.01 MG/DL
CRYPTOCOCCUS NEOFORMANS/GATTII: NOT DETECTED
CRYPTOCOCCUS NEOFORMANS/GATTII: NOT DETECTED
CTX-M GENE: NOT DETECTED
CTX-M GENE: NOT DETECTED
DIFFERENTIAL METHOD: ABNORMAL
DIFFERENTIAL METHOD: ABNORMAL
ENTEROBACTER CLOACAE COMPLEX: NOT DETECTED
ENTEROBACTER CLOACAE COMPLEX: NOT DETECTED
ENTEROBACTERALES: NOT DETECTED
ENTEROBACTERALES: NOT DETECTED
ENTEROCOCCUS FAECALIS: NOT DETECTED
ENTEROCOCCUS FAECALIS: NOT DETECTED
ENTEROCOCCUS FAECIUM: NOT DETECTED
ENTEROCOCCUS FAECIUM: NOT DETECTED
EOSINOPHIL # BLD AUTO: 0.1 K/UL (ref 0–0.5)
EOSINOPHIL # BLD AUTO: 0.1 K/UL (ref 0–0.5)
EOSINOPHIL NFR BLD: 0.6 % (ref 0–8)
EOSINOPHIL NFR BLD: 0.9 % (ref 0–8)
ERYTHROCYTE [DISTWIDTH] IN BLOOD BY AUTOMATED COUNT: 13.5 % (ref 11.5–14.5)
ERYTHROCYTE [DISTWIDTH] IN BLOOD BY AUTOMATED COUNT: 13.6 % (ref 11.5–14.5)
ESCHERICHIA COLI: NOT DETECTED
ESCHERICHIA COLI: NOT DETECTED
EST. GFR  (NO RACE VARIABLE): >60 ML/MIN/1.73 M^2
ESTIMATED AVG GLUCOSE: 120 MG/DL (ref 68–131)
GLUCOSE SERPL-MCNC: 100 MG/DL (ref 70–110)
GLUCOSE SERPL-MCNC: 135 MG/DL (ref 70–110)
GLUCOSE SERPL-MCNC: 141 MG/DL (ref 70–110)
GLUCOSE SERPL-MCNC: 190 MG/DL (ref 70–110)
GLUCOSE SERPL-MCNC: 73 MG/DL (ref 70–110)
GLUCOSE SERPL-MCNC: 99 MG/DL (ref 70–110)
HAEMOPHILUS INFLUENZAE: NOT DETECTED
HAEMOPHILUS INFLUENZAE: NOT DETECTED
HBA1C MFR BLD: 5.8 % (ref 4.5–6.2)
HCT VFR BLD AUTO: 23.9 % (ref 40–54)
HCT VFR BLD AUTO: 24.2 % (ref 40–54)
HCT VFR BLD AUTO: 25.2 % (ref 40–54)
HGB BLD-MCNC: 7.9 G/DL (ref 14–18)
HGB BLD-MCNC: 8 G/DL (ref 14–18)
HGB BLD-MCNC: 8 G/DL (ref 14–18)
IMM GRANULOCYTES # BLD AUTO: 0.04 K/UL (ref 0–0.04)
IMM GRANULOCYTES # BLD AUTO: 0.06 K/UL (ref 0–0.04)
IMM GRANULOCYTES NFR BLD AUTO: 0.4 % (ref 0–0.5)
IMM GRANULOCYTES NFR BLD AUTO: 0.7 % (ref 0–0.5)
IMP GENE: NOT DETECTED
IMP GENE: NOT DETECTED
INR PPP: 1.2 (ref 0.8–1.2)
KLEBSIELLA AEROGENES: NOT DETECTED
KLEBSIELLA AEROGENES: NOT DETECTED
KLEBSIELLA OXYTOCA: NOT DETECTED
KLEBSIELLA OXYTOCA: NOT DETECTED
KLEBSIELLA PNEUMONIAE GROUP: NOT DETECTED
KLEBSIELLA PNEUMONIAE GROUP: NOT DETECTED
KPC: NOT DETECTED
KPC: NOT DETECTED
LACTATE SERPL-SCNC: 0.7 MMOL/L (ref 0.5–2.2)
LISTERIA MONOCYTOGENES: NOT DETECTED
LISTERIA MONOCYTOGENES: NOT DETECTED
LYMPHOCYTES # BLD AUTO: 0.2 K/UL (ref 1–4.8)
LYMPHOCYTES # BLD AUTO: 0.3 K/UL (ref 1–4.8)
LYMPHOCYTES NFR BLD: 2.8 % (ref 18–48)
LYMPHOCYTES NFR BLD: 3.1 % (ref 18–48)
MAGNESIUM SERPL-MCNC: 1.7 MG/DL (ref 1.6–2.6)
MAGNESIUM SERPL-MCNC: 1.8 MG/DL (ref 1.6–2.6)
MAGNESIUM SERPL-MCNC: 1.8 MG/DL (ref 1.6–2.6)
MCH RBC QN AUTO: 29.6 PG (ref 27–31)
MCH RBC QN AUTO: 30.3 PG (ref 27–31)
MCHC RBC AUTO-ENTMCNC: 31.3 G/DL (ref 32–36)
MCHC RBC AUTO-ENTMCNC: 33.5 G/DL (ref 32–36)
MCR-1: ABNORMAL
MCR-1: ABNORMAL
MCV RBC AUTO: 91 FL (ref 82–98)
MCV RBC AUTO: 94 FL (ref 82–98)
MEC A/C AND MREJ (MRSA): ABNORMAL
MEC A/C AND MREJ (MRSA): ABNORMAL
MEC A/C: ABNORMAL
MEC A/C: ABNORMAL
MONOCYTES # BLD AUTO: 0.3 K/UL (ref 0.3–1)
MONOCYTES # BLD AUTO: 0.4 K/UL (ref 0.3–1)
MONOCYTES NFR BLD: 3.1 % (ref 4–15)
MONOCYTES NFR BLD: 4.4 % (ref 4–15)
MRSA SCREEN BY PCR: NEGATIVE
NDM: NOT DETECTED
NDM: NOT DETECTED
NEISSERIA MENINGITIDIS: NOT DETECTED
NEISSERIA MENINGITIDIS: NOT DETECTED
NEUTROPHILS # BLD AUTO: 7.7 K/UL (ref 1.8–7.7)
NEUTROPHILS # BLD AUTO: 8.8 K/UL (ref 1.8–7.7)
NEUTROPHILS NFR BLD: 91.1 % (ref 38–73)
NEUTROPHILS NFR BLD: 92.6 % (ref 38–73)
NRBC BLD-RTO: 0 /100 WBC
NRBC BLD-RTO: 0 /100 WBC
OXA-48-LIKE: ABNORMAL
OXA-48-LIKE: ABNORMAL
PHOSPHATE SERPL-MCNC: 3.6 MG/DL (ref 2.7–4.5)
PLATELET # BLD AUTO: 307 K/UL (ref 150–450)
PLATELET # BLD AUTO: 323 K/UL (ref 150–450)
PMV BLD AUTO: 9.5 FL (ref 9.2–12.9)
PMV BLD AUTO: 9.6 FL (ref 9.2–12.9)
POTASSIUM SERPL-SCNC: 3 MMOL/L (ref 3.5–5.1)
POTASSIUM SERPL-SCNC: 3.6 MMOL/L (ref 3.5–5.1)
POTASSIUM SERPL-SCNC: 4 MMOL/L (ref 3.5–5.1)
PROT SERPL-MCNC: 5.4 G/DL (ref 6–8.4)
PROT SERPL-MCNC: 5.8 G/DL (ref 6–8.4)
PROTEUS SPECIES: NOT DETECTED
PROTEUS SPECIES: NOT DETECTED
PROTHROMBIN TIME: 12.7 SEC (ref 9–12.5)
PSEUDOMONAS AERUGINOSA: DETECTED
PSEUDOMONAS AERUGINOSA: DETECTED
RBC # BLD AUTO: 2.64 M/UL (ref 4.6–6.2)
RBC # BLD AUTO: 2.67 M/UL (ref 4.6–6.2)
SALMONELLA SP: NOT DETECTED
SALMONELLA SP: NOT DETECTED
SERRATIA MARCESCENS: NOT DETECTED
SERRATIA MARCESCENS: NOT DETECTED
SODIUM SERPL-SCNC: 130 MMOL/L (ref 136–145)
SODIUM SERPL-SCNC: 132 MMOL/L (ref 136–145)
SODIUM SERPL-SCNC: 132 MMOL/L (ref 136–145)
STAPHYLOCOCCUS AUREUS: NOT DETECTED
STAPHYLOCOCCUS AUREUS: NOT DETECTED
STAPHYLOCOCCUS EPIDERMIDIS: NOT DETECTED
STAPHYLOCOCCUS EPIDERMIDIS: NOT DETECTED
STAPHYLOCOCCUS LUGDUNESIS: NOT DETECTED
STAPHYLOCOCCUS LUGDUNESIS: NOT DETECTED
STAPHYLOCOCCUS SPECIES: NOT DETECTED
STAPHYLOCOCCUS SPECIES: NOT DETECTED
STENOTROPHOMONAS MALTOPHILIA: NOT DETECTED
STENOTROPHOMONAS MALTOPHILIA: NOT DETECTED
STREPTOCOCCUS AGALACTIAE: NOT DETECTED
STREPTOCOCCUS AGALACTIAE: NOT DETECTED
STREPTOCOCCUS PNEUMONIAE: NOT DETECTED
STREPTOCOCCUS PNEUMONIAE: NOT DETECTED
STREPTOCOCCUS PYOGENES: NOT DETECTED
STREPTOCOCCUS PYOGENES: NOT DETECTED
STREPTOCOCCUS SPECIES: NOT DETECTED
STREPTOCOCCUS SPECIES: NOT DETECTED
VAN A/B: ABNORMAL
VAN A/B: ABNORMAL
VIM: NOT DETECTED
VIM: NOT DETECTED
WBC # BLD AUTO: 8.43 K/UL (ref 3.9–12.7)
WBC # BLD AUTO: 9.45 K/UL (ref 3.9–12.7)

## 2022-11-23 PROCEDURE — 84100 ASSAY OF PHOSPHORUS: CPT | Performed by: OTOLARYNGOLOGY

## 2022-11-23 PROCEDURE — 99900026 HC AIRWAY MAINTENANCE (STAT)

## 2022-11-23 PROCEDURE — 99222 1ST HOSP IP/OBS MODERATE 55: CPT | Mod: AI,,, | Performed by: OTOLARYNGOLOGY

## 2022-11-23 PROCEDURE — 99204 OFFICE O/P NEW MOD 45 MIN: CPT | Mod: ,,, | Performed by: OTOLARYNGOLOGY

## 2022-11-23 PROCEDURE — 87040 BLOOD CULTURE FOR BACTERIA: CPT | Performed by: INTERNAL MEDICINE

## 2022-11-23 PROCEDURE — 80048 BASIC METABOLIC PNL TOTAL CA: CPT | Performed by: INTERNAL MEDICINE

## 2022-11-23 PROCEDURE — 83735 ASSAY OF MAGNESIUM: CPT | Mod: 91 | Performed by: OTOLARYNGOLOGY

## 2022-11-23 PROCEDURE — 94640 AIRWAY INHALATION TREATMENT: CPT | Mod: 76

## 2022-11-23 PROCEDURE — 99900035 HC TECH TIME PER 15 MIN (STAT)

## 2022-11-23 PROCEDURE — 99233 SBSQ HOSP IP/OBS HIGH 50: CPT | Mod: ,,, | Performed by: INTERNAL MEDICINE

## 2022-11-23 PROCEDURE — 25000242 PHARM REV CODE 250 ALT 637 W/ HCPCS: Performed by: STUDENT IN AN ORGANIZED HEALTH CARE EDUCATION/TRAINING PROGRAM

## 2022-11-23 PROCEDURE — 63600175 PHARM REV CODE 636 W HCPCS: Performed by: INTERNAL MEDICINE

## 2022-11-23 PROCEDURE — 63600175 PHARM REV CODE 636 W HCPCS

## 2022-11-23 PROCEDURE — 87154 CUL TYP ID BLD PTHGN 6+ TRGT: CPT | Performed by: INTERNAL MEDICINE

## 2022-11-23 PROCEDURE — G0378 HOSPITAL OBSERVATION PER HR: HCPCS

## 2022-11-23 PROCEDURE — 96366 THER/PROPH/DIAG IV INF ADDON: CPT

## 2022-11-23 PROCEDURE — 85014 HEMATOCRIT: CPT | Performed by: INTERNAL MEDICINE

## 2022-11-23 PROCEDURE — 80053 COMPREHEN METABOLIC PANEL: CPT | Performed by: INTERNAL MEDICINE

## 2022-11-23 PROCEDURE — 82330 ASSAY OF CALCIUM: CPT | Mod: 91 | Performed by: INTERNAL MEDICINE

## 2022-11-23 PROCEDURE — 99233 PR SUBSEQUENT HOSPITAL CARE,LEVL III: ICD-10-PCS | Mod: ,,, | Performed by: INTERNAL MEDICINE

## 2022-11-23 PROCEDURE — 25000003 PHARM REV CODE 250

## 2022-11-23 PROCEDURE — 94761 N-INVAS EAR/PLS OXIMETRY MLT: CPT

## 2022-11-23 PROCEDURE — 83735 ASSAY OF MAGNESIUM: CPT | Performed by: STUDENT IN AN ORGANIZED HEALTH CARE EDUCATION/TRAINING PROGRAM

## 2022-11-23 PROCEDURE — 96361 HYDRATE IV INFUSION ADD-ON: CPT

## 2022-11-23 PROCEDURE — 20000000 HC ICU ROOM

## 2022-11-23 PROCEDURE — 99222 PR INITIAL HOSPITAL CARE,LEVL II: ICD-10-PCS | Mod: AI,,, | Performed by: OTOLARYNGOLOGY

## 2022-11-23 PROCEDURE — 25000003 PHARM REV CODE 250: Performed by: INTERNAL MEDICINE

## 2022-11-23 PROCEDURE — 27000221 HC OXYGEN, UP TO 24 HOURS

## 2022-11-23 PROCEDURE — 82330 ASSAY OF CALCIUM: CPT | Performed by: STUDENT IN AN ORGANIZED HEALTH CARE EDUCATION/TRAINING PROGRAM

## 2022-11-23 PROCEDURE — 86140 C-REACTIVE PROTEIN: CPT | Performed by: STUDENT IN AN ORGANIZED HEALTH CARE EDUCATION/TRAINING PROGRAM

## 2022-11-23 PROCEDURE — 99900031 HC PATIENT EDUCATION (STAT)

## 2022-11-23 PROCEDURE — 96376 TX/PRO/DX INJ SAME DRUG ADON: CPT

## 2022-11-23 PROCEDURE — 80053 COMPREHEN METABOLIC PANEL: CPT | Mod: 91 | Performed by: OTOLARYNGOLOGY

## 2022-11-23 PROCEDURE — 99205 OFFICE O/P NEW HI 60 MIN: CPT | Mod: ,,, | Performed by: INTERNAL MEDICINE

## 2022-11-23 PROCEDURE — 83605 ASSAY OF LACTIC ACID: CPT | Performed by: OTOLARYNGOLOGY

## 2022-11-23 PROCEDURE — 94799 UNLISTED PULMONARY SVC/PX: CPT

## 2022-11-23 PROCEDURE — 85018 HEMOGLOBIN: CPT | Performed by: INTERNAL MEDICINE

## 2022-11-23 PROCEDURE — C9113 INJ PANTOPRAZOLE SODIUM, VIA: HCPCS | Performed by: INTERNAL MEDICINE

## 2022-11-23 PROCEDURE — 63600175 PHARM REV CODE 636 W HCPCS: Performed by: STUDENT IN AN ORGANIZED HEALTH CARE EDUCATION/TRAINING PROGRAM

## 2022-11-23 PROCEDURE — 36415 COLL VENOUS BLD VENIPUNCTURE: CPT | Performed by: STUDENT IN AN ORGANIZED HEALTH CARE EDUCATION/TRAINING PROGRAM

## 2022-11-23 PROCEDURE — 83735 ASSAY OF MAGNESIUM: CPT | Mod: 91 | Performed by: INTERNAL MEDICINE

## 2022-11-23 PROCEDURE — 85730 THROMBOPLASTIN TIME PARTIAL: CPT | Performed by: OTOLARYNGOLOGY

## 2022-11-23 PROCEDURE — 94640 AIRWAY INHALATION TREATMENT: CPT

## 2022-11-23 PROCEDURE — 85025 COMPLETE CBC W/AUTO DIFF WBC: CPT | Performed by: INTERNAL MEDICINE

## 2022-11-23 PROCEDURE — 82330 ASSAY OF CALCIUM: CPT | Mod: 91 | Performed by: OTOLARYNGOLOGY

## 2022-11-23 PROCEDURE — 96375 TX/PRO/DX INJ NEW DRUG ADDON: CPT | Mod: 59

## 2022-11-23 PROCEDURE — 25000003 PHARM REV CODE 250: Performed by: STUDENT IN AN ORGANIZED HEALTH CARE EDUCATION/TRAINING PROGRAM

## 2022-11-23 PROCEDURE — 85610 PROTHROMBIN TIME: CPT | Performed by: OTOLARYNGOLOGY

## 2022-11-23 PROCEDURE — 85025 COMPLETE CBC W/AUTO DIFF WBC: CPT | Mod: 91 | Performed by: OTOLARYNGOLOGY

## 2022-11-23 PROCEDURE — 99204 PR OFFICE/OUTPT VISIT, NEW, LEVL IV, 45-59 MIN: ICD-10-PCS | Mod: ,,, | Performed by: OTOLARYNGOLOGY

## 2022-11-23 PROCEDURE — 87147 CULTURE TYPE IMMUNOLOGIC: CPT | Performed by: INTERNAL MEDICINE

## 2022-11-23 PROCEDURE — 99205 PR OFFICE/OUTPT VISIT, NEW, LEVL V, 60-74 MIN: ICD-10-PCS | Mod: ,,, | Performed by: INTERNAL MEDICINE

## 2022-11-23 RX ORDER — GLUCAGON 1 MG
1 KIT INJECTION
Status: DISCONTINUED | OUTPATIENT
Start: 2022-11-23 | End: 2022-12-11

## 2022-11-23 RX ORDER — SODIUM CHLORIDE, SODIUM LACTATE, POTASSIUM CHLORIDE, CALCIUM CHLORIDE 600; 310; 30; 20 MG/100ML; MG/100ML; MG/100ML; MG/100ML
INJECTION, SOLUTION INTRAVENOUS CONTINUOUS
Status: DISCONTINUED | OUTPATIENT
Start: 2022-11-23 | End: 2022-11-23 | Stop reason: HOSPADM

## 2022-11-23 RX ORDER — CALCIUM GLUCONATE 20 MG/ML
1 INJECTION, SOLUTION INTRAVENOUS
Status: DISCONTINUED | OUTPATIENT
Start: 2022-11-23 | End: 2022-11-23 | Stop reason: HOSPADM

## 2022-11-23 RX ORDER — ENOXAPARIN SODIUM 100 MG/ML
40 INJECTION SUBCUTANEOUS EVERY 24 HOURS
Status: DISCONTINUED | OUTPATIENT
Start: 2022-11-23 | End: 2022-12-13 | Stop reason: HOSPADM

## 2022-11-23 RX ORDER — CALCIUM GLUCONATE 20 MG/ML
3 INJECTION, SOLUTION INTRAVENOUS
Status: DISCONTINUED | OUTPATIENT
Start: 2022-11-23 | End: 2022-11-23 | Stop reason: HOSPADM

## 2022-11-23 RX ORDER — CALCIUM GLUCONATE 20 MG/ML
2 INJECTION, SOLUTION INTRAVENOUS
Status: DISCONTINUED | OUTPATIENT
Start: 2022-11-23 | End: 2022-11-25

## 2022-11-23 RX ORDER — HYDROMORPHONE HYDROCHLORIDE 1 MG/ML
0.5 INJECTION, SOLUTION INTRAMUSCULAR; INTRAVENOUS; SUBCUTANEOUS EVERY 4 HOURS PRN
Status: DISCONTINUED | OUTPATIENT
Start: 2022-11-23 | End: 2022-11-24

## 2022-11-23 RX ORDER — ONDANSETRON 2 MG/ML
4 INJECTION INTRAMUSCULAR; INTRAVENOUS EVERY 8 HOURS PRN
Status: DISCONTINUED | OUTPATIENT
Start: 2022-11-23 | End: 2022-12-13 | Stop reason: HOSPADM

## 2022-11-23 RX ORDER — PROMETHAZINE HYDROCHLORIDE 25 MG/ML
25 INJECTION, SOLUTION INTRAMUSCULAR; INTRAVENOUS EVERY 6 HOURS PRN
Status: DISCONTINUED | OUTPATIENT
Start: 2022-11-23 | End: 2022-11-23

## 2022-11-23 RX ORDER — MAGNESIUM SULFATE HEPTAHYDRATE 40 MG/ML
4 INJECTION, SOLUTION INTRAVENOUS
Status: DISCONTINUED | OUTPATIENT
Start: 2022-11-23 | End: 2022-11-25

## 2022-11-23 RX ORDER — CALCITRIOL 0.25 UG/1
0.25 CAPSULE ORAL DAILY
Status: DISCONTINUED | OUTPATIENT
Start: 2022-11-24 | End: 2022-11-25

## 2022-11-23 RX ORDER — PANTOPRAZOLE SODIUM 40 MG/10ML
40 INJECTION, POWDER, LYOPHILIZED, FOR SOLUTION INTRAVENOUS DAILY
Status: DISCONTINUED | OUTPATIENT
Start: 2022-11-24 | End: 2022-12-08

## 2022-11-23 RX ORDER — POLYETHYLENE GLYCOL 3350 17 G/17G
17 POWDER, FOR SOLUTION ORAL DAILY
Status: DISCONTINUED | OUTPATIENT
Start: 2022-11-24 | End: 2022-12-13 | Stop reason: HOSPADM

## 2022-11-23 RX ORDER — HYDROCODONE BITARTRATE AND ACETAMINOPHEN 5; 325 MG/1; MG/1
1 TABLET ORAL EVERY 4 HOURS PRN
Status: DISCONTINUED | OUTPATIENT
Start: 2022-11-23 | End: 2022-11-25

## 2022-11-23 RX ORDER — POTASSIUM CHLORIDE 29.8 MG/ML
80 INJECTION INTRAVENOUS
Status: DISCONTINUED | OUTPATIENT
Start: 2022-11-23 | End: 2022-11-25

## 2022-11-23 RX ORDER — POTASSIUM CHLORIDE 14.9 MG/ML
60 INJECTION INTRAVENOUS
Status: DISCONTINUED | OUTPATIENT
Start: 2022-11-23 | End: 2022-11-25

## 2022-11-23 RX ORDER — MUPIROCIN 20 MG/G
OINTMENT TOPICAL 2 TIMES DAILY
Status: DISCONTINUED | OUTPATIENT
Start: 2022-11-23 | End: 2022-11-23 | Stop reason: HOSPADM

## 2022-11-23 RX ORDER — SODIUM CHLORIDE 0.9 % (FLUSH) 0.9 %
10 SYRINGE (ML) INJECTION
Status: DISCONTINUED | OUTPATIENT
Start: 2022-11-23 | End: 2022-12-13 | Stop reason: HOSPADM

## 2022-11-23 RX ORDER — CALCIUM CARBONATE 200(500)MG
1000 TABLET,CHEWABLE ORAL 4 TIMES DAILY
Status: DISCONTINUED | OUTPATIENT
Start: 2022-11-23 | End: 2022-11-25

## 2022-11-23 RX ORDER — HYDRALAZINE HYDROCHLORIDE 20 MG/ML
10 INJECTION INTRAMUSCULAR; INTRAVENOUS EVERY 6 HOURS PRN
Status: DISCONTINUED | OUTPATIENT
Start: 2022-11-23 | End: 2022-12-13 | Stop reason: HOSPADM

## 2022-11-23 RX ORDER — INSULIN ASPART 100 [IU]/ML
1-10 INJECTION, SOLUTION INTRAVENOUS; SUBCUTANEOUS EVERY 6 HOURS PRN
Status: DISCONTINUED | OUTPATIENT
Start: 2022-11-23 | End: 2022-12-11

## 2022-11-23 RX ORDER — CALCIUM GLUCONATE 20 MG/ML
3 INJECTION, SOLUTION INTRAVENOUS
Status: DISCONTINUED | OUTPATIENT
Start: 2022-11-23 | End: 2022-11-25

## 2022-11-23 RX ORDER — FAMOTIDINE 20 MG/1
20 TABLET, FILM COATED ORAL 2 TIMES DAILY
Status: DISCONTINUED | OUTPATIENT
Start: 2022-11-23 | End: 2022-11-23

## 2022-11-23 RX ORDER — AMOXICILLIN 250 MG
1 CAPSULE ORAL DAILY PRN
Status: DISCONTINUED | OUTPATIENT
Start: 2022-11-23 | End: 2022-12-13 | Stop reason: HOSPADM

## 2022-11-23 RX ORDER — POTASSIUM CHLORIDE 29.8 MG/ML
40 INJECTION INTRAVENOUS
Status: DISCONTINUED | OUTPATIENT
Start: 2022-11-23 | End: 2022-11-25

## 2022-11-23 RX ORDER — SODIUM CHLORIDE, SODIUM LACTATE, POTASSIUM CHLORIDE, CALCIUM CHLORIDE 600; 310; 30; 20 MG/100ML; MG/100ML; MG/100ML; MG/100ML
INJECTION, SOLUTION INTRAVENOUS CONTINUOUS
Status: DISCONTINUED | OUTPATIENT
Start: 2022-11-23 | End: 2022-12-05

## 2022-11-23 RX ORDER — MAGNESIUM SULFATE HEPTAHYDRATE 40 MG/ML
2 INJECTION, SOLUTION INTRAVENOUS
Status: DISCONTINUED | OUTPATIENT
Start: 2022-11-23 | End: 2022-11-25

## 2022-11-23 RX ORDER — CALCIUM GLUCONATE 20 MG/ML
1 INJECTION, SOLUTION INTRAVENOUS
Status: DISCONTINUED | OUTPATIENT
Start: 2022-11-23 | End: 2022-11-25

## 2022-11-23 RX ORDER — ACETAMINOPHEN 325 MG/1
650 TABLET ORAL EVERY 4 HOURS PRN
Status: DISCONTINUED | OUTPATIENT
Start: 2022-11-23 | End: 2022-12-13 | Stop reason: HOSPADM

## 2022-11-23 RX ORDER — CHLORHEXIDINE GLUCONATE ORAL RINSE 1.2 MG/ML
15 SOLUTION DENTAL 2 TIMES DAILY
Status: DISCONTINUED | OUTPATIENT
Start: 2022-11-23 | End: 2022-11-23 | Stop reason: HOSPADM

## 2022-11-23 RX ORDER — CALCIUM GLUCONATE 20 MG/ML
2 INJECTION, SOLUTION INTRAVENOUS
Status: DISCONTINUED | OUTPATIENT
Start: 2022-11-23 | End: 2022-11-23 | Stop reason: HOSPADM

## 2022-11-23 RX ADMIN — ONDANSETRON 4 MG: 2 INJECTION INTRAMUSCULAR; INTRAVENOUS at 08:11

## 2022-11-23 RX ADMIN — MAGNESIUM OXIDE 400 MG (241.3 MG MAGNESIUM) TABLET 800 MG: at 04:11

## 2022-11-23 RX ADMIN — CALCIUM CARBONATE (ANTACID) CHEW TAB 500 MG 1000 MG: 500 CHEW TAB at 08:11

## 2022-11-23 RX ADMIN — IPRATROPIUM BROMIDE AND ALBUTEROL SULFATE 3 ML: .5; 3 SOLUTION RESPIRATORY (INHALATION) at 08:11

## 2022-11-23 RX ADMIN — CHLORHEXIDINE GLUCONATE 15 ML: 1.2 RINSE ORAL at 08:11

## 2022-11-23 RX ADMIN — ENOXAPARIN SODIUM 40 MG: 40 INJECTION SUBCUTANEOUS at 06:11

## 2022-11-23 RX ADMIN — IPRATROPIUM BROMIDE AND ALBUTEROL SULFATE 3 ML: .5; 3 SOLUTION RESPIRATORY (INHALATION) at 12:11

## 2022-11-23 RX ADMIN — LEVOTHYROXINE SODIUM 100 MCG: 100 TABLET ORAL at 06:11

## 2022-11-23 RX ADMIN — POTASSIUM CHLORIDE 40 MEQ: 29.8 INJECTION, SOLUTION INTRAVENOUS at 06:11

## 2022-11-23 RX ADMIN — POTASSIUM BICARBONATE 60 MEQ: 391 TABLET, EFFERVESCENT ORAL at 08:11

## 2022-11-23 RX ADMIN — MAGNESIUM SULFATE 2 G: 2 INJECTION INTRAVENOUS at 06:11

## 2022-11-23 RX ADMIN — MUPIROCIN 1 G: 20 OINTMENT TOPICAL at 08:11

## 2022-11-23 RX ADMIN — ONDANSETRON 4 MG: 2 INJECTION INTRAMUSCULAR; INTRAVENOUS at 04:11

## 2022-11-23 RX ADMIN — MORPHINE SULFATE 2 MG: 2 INJECTION, SOLUTION INTRAMUSCULAR; INTRAVENOUS at 12:11

## 2022-11-23 RX ADMIN — POTASSIUM BICARBONATE 60 MEQ: 391 TABLET, EFFERVESCENT ORAL at 04:11

## 2022-11-23 RX ADMIN — PIPERACILLIN SODIUM AND TAZOBACTAM SODIUM 3.38 G: 3; .375 INJECTION, POWDER, LYOPHILIZED, FOR SOLUTION INTRAVENOUS at 11:11

## 2022-11-23 RX ADMIN — MAGNESIUM OXIDE 400 MG (241.3 MG MAGNESIUM) TABLET 800 MG: at 08:11

## 2022-11-23 RX ADMIN — SODIUM CHLORIDE, POTASSIUM CHLORIDE, SODIUM LACTATE AND CALCIUM CHLORIDE: 600; 310; 30; 20 INJECTION, SOLUTION INTRAVENOUS at 05:11

## 2022-11-23 RX ADMIN — PIPERACILLIN SODIUM AND TAZOBACTAM SODIUM 3.38 G: 3; .375 INJECTION, POWDER, LYOPHILIZED, FOR SOLUTION INTRAVENOUS at 02:11

## 2022-11-23 RX ADMIN — PANTOPRAZOLE SODIUM 40 MG: 40 INJECTION, POWDER, FOR SOLUTION INTRAVENOUS at 06:11

## 2022-11-23 RX ADMIN — PIPERACILLIN SODIUM AND TAZOBACTAM SODIUM 4.5 G: 4; .5 INJECTION, POWDER, LYOPHILIZED, FOR SOLUTION INTRAVENOUS at 06:11

## 2022-11-23 RX ADMIN — CALCIUM GLUCONATE 2 G: 20 INJECTION, SOLUTION INTRAVENOUS at 06:11

## 2022-11-23 RX ADMIN — SODIUM CHLORIDE, SODIUM LACTATE, POTASSIUM CHLORIDE, AND CALCIUM CHLORIDE: .6; .31; .03; .02 INJECTION, SOLUTION INTRAVENOUS at 06:11

## 2022-11-23 RX ADMIN — MORPHINE SULFATE 2 MG: 2 INJECTION, SOLUTION INTRAMUSCULAR; INTRAVENOUS at 06:11

## 2022-11-23 NOTE — CONSULTS
[facility] - Surgical Intensive Care  Otorhinolaryngology-Head & Neck Surgery  Consult Note    Patient Name: Cyrus Batres Jr.  MRN: [id number]  Code Status: Full Code  Admission Date: 11/23/2022  Hospital Length of Stay: 0 days  Attending Physician: Matheus Levy MD  Primary Care Provider: Maico Patterson MD    Patient information was obtained from patient, past medical records and ER records.     Consults  Subjective:     Chief Complaint/Reason for Admission: Postop issue     History of Present Illness: 72 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 Patient presented to outside ED yesterday as patient was feeling overall malaise and according to patient had persistent cough. Was found to have elevated WBC and blood cultures positive for pseudomonas. Was admitted as he was unable to be transferred. 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 Had CT scan which showed possible phlegmon in submandibular region although on personal read appears more consistent with postoperative changes. Patient denies any worsening neck swelling or pain. Denies any regurgitation or difficulty tolerating tube feeds.      Medications:  Continuous Infusions:   lactated ringers 100 mL/hr at 11/23/22 1727     Scheduled Meds:   enoxaparin  40 mg Subcutaneous Daily    famotidine  20 mg Per G Tube BID    piperacillin-tazobactam (ZOSYN) IVPB  4.5 g Intravenous Q8H    [START ON 11/24/2022] polyethylene glycol  17 g Per G Tube Daily     PRN Meds:acetaminophen, calcium gluconate IVPB, calcium gluconate IVPB, calcium gluconate IVPB, HYDROcodone-acetaminophen, HYDROmorphone, magnesium sulfate IVPB, magnesium sulfate IVPB, ondansetron, potassium chloride in water **AND** potassium chloride in water **AND** potassium chloride in water, sodium chloride 0.9%     Current Facility-Administered  Medications on File Prior to Encounter   Medication    [COMPLETED] calcium gluconate 1 g in NS IVPB (premixed)    [DISCONTINUED] 0.9%  NaCl infusion    [DISCONTINUED] acetaminophen tablet 650 mg    [DISCONTINUED] albuterol-ipratropium 2.5 mg-0.5 mg/3 mL nebulizer solution 3 mL    [DISCONTINUED] calcium gluconate 1 g in NS IVPB (premixed)    [DISCONTINUED] calcium gluconate 1 g in NS IVPB (premixed)    [DISCONTINUED] calcium gluconate 1 g in NS IVPB (premixed)    [DISCONTINUED] chlorhexidine 0.12 % solution 15 mL    [DISCONTINUED] dextrose 10% bolus 125 mL    [DISCONTINUED] dextrose 10% bolus 125 mL    [DISCONTINUED] dextrose 10% bolus 250 mL    [DISCONTINUED] glucagon (human recombinant) injection 1 mg    [DISCONTINUED] hydrALAZINE injection 10 mg    [DISCONTINUED] insulin aspart U-100 pen 0-5 Units    [DISCONTINUED] lactated ringers infusion    [DISCONTINUED] lactated ringers infusion    [DISCONTINUED] levothyroxine tablet 100 mcg    [DISCONTINUED] magnesium oxide tablet 800 mg    [DISCONTINUED] magnesium oxide tablet 800 mg    [DISCONTINUED] morphine injection 2 mg    [DISCONTINUED] mupirocin 2 % ointment    [DISCONTINUED] ondansetron injection 4 mg    [DISCONTINUED] pantoprazole injection 40 mg    [DISCONTINUED] piperacillin-tazobactam 3.375 g in dextrose 5 % 50 mL IVPB (ready to mix system)    [DISCONTINUED] potassium bicarbonate disintegrating tablet 35 mEq    [DISCONTINUED] potassium bicarbonate disintegrating tablet 50 mEq    [DISCONTINUED] potassium bicarbonate disintegrating tablet 60 mEq    [DISCONTINUED] potassium, sodium phosphates 280-160-250 mg packet 2 packet    [DISCONTINUED] potassium, sodium phosphates 280-160-250 mg packet 2 packet    [DISCONTINUED] potassium, sodium phosphates 280-160-250 mg packet 2 packet    [DISCONTINUED] sodium chloride 0.9% bolus 1,000 mL    [DISCONTINUED] sodium chloride 0.9% flush 10 mL     No current outpatient medications on file prior to encounter.       Review of  patient's allergies indicates:   Allergen Reactions    Lovastatin Itching       Past Medical History:   Diagnosis Date    Hypothyroidism 11/22/2022    PEG (percutaneous endoscopic gastrostomy) adjustment/replacement/removal 11/22/2022    Type 2 diabetes mellitus, without long-term current use of insulin 11/22/2022     No past surgical history on file.  Family History    None       Tobacco Use    Smoking status: Not on file    Smokeless tobacco: Not on file   Substance and Sexual Activity    Alcohol use: Not on file    Drug use: Not on file    Sexual activity: Not on file     Review of Systems   Constitutional:  Positive for fatigue. Negative for fever.   Gastrointestinal:  Positive for vomiting.   Objective:     Vital Signs (Most Recent):  Pulse: 91 (11/23/22 1700)  Resp: 14 (11/23/22 1700)  BP: 115/67 (11/23/22 1700)  SpO2: 100 % (11/23/22 1700) Vital Signs (24h Range):  Temp:  [97.4 °F (36.3 °C)-98.3 °F (36.8 °C)] 97.8 °F (36.6 °C)  Pulse:  [] 91  Resp:  [14-29] 14  SpO2:  [99 %-100 %] 100 %  BP: (114-168)/(59-85) 115/67     Weight: 62.2 kg (137 lb 2 oz)  Body mass index is 22.13 kg/m².    Physical Exam  Constitutional:       General: He is not in acute distress.  HENT:      Head:      Comments: Mild facial edema      Mouth/Throat: Evidence of tube feeds actively regurgitating, suctioned.      Comments: Flap soft to palpation, sutures to FOM intact   Eyes:      Extraocular Movements: Extraocular movements intact.   Neck:      Comments: Trach in place to stoma   Skin graft to midline neck overlying flap, dry  Small area of granulation laterally, able to probe with q-tip but no expression of drainage   Flap is soft without fluctuance   Cardiovascular:      Rate and Rhythm: Normal rate and regular rhythm.   Pulmonary:      Effort: No respiratory distress.   Neurological:      Mental Status: He is alert.     Significant Labs:  CBC:   Recent Labs   Lab 11/23/22  1721   WBC 8.43   RBC 2.64*   HGB 8.0*   HCT 23.9*  "     MCV 91   MCH 30.3   MCHC 33.5     CMP:   Recent Labs   Lab 11/23/22  0311 11/23/22  1342    141*   CALCIUM 6.8* 7.0*   ALBUMIN 2.4*  --    PROT 5.8*  --    * 130*   K 3.0* 4.0   CO2 21* 22*    98   BUN 24* 19   CREATININE 0.7 0.6   ALKPHOS 767*  --    *  --    AST 82*  --    BILITOT 5.0*  --        Significant Diagnostics:  I have reviewed all pertinent imaging results/findings within the past 24 hours.   CT neck without obvious abscess on personal read, possibly some phlegmon vs postsurgical changes posterior to flap but unclear source dehiscence  CXR clear        Assessment/Plan:     Laryngeal cancer  72 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    Patient with elevated WBC and positive blood cultures. Actively vomiting tube feeds during evaluation. G-tube decompressed with large amount of tube feeds removed with resolution of vomiting. Unclear source of infection at this point.     ENT/HNS:  - Laryngectomy protocol   -Sign above bed + outside door stating patient is "neck breather" and "can not be intubated by mouth"   -Clean laryngectomy stoma as needed, or daily  - Keep tracheostomy in place     - OK to replace tracheostomy if dislodged, only in place for swelling/secretion assistance   -Humidified O2 via trach collar     Neuro:   - Pain: Multimodality PRN regimen initiated     Cardiac:  - HDS  - Page ENT before starting vasopressors  - H/O of paroxysmal a fib, will CTM      Pulmonary:   - PRN breathing treatments  - Humidified air per trach collar  - OK to remove tracheostomy for adequate suctioning      Renal:   - monitor Is and Os  - Cr stable     Infectious Disease:  - Leukocytosis and BC positive for pseudomonas    - ID consulted at OSH with recs for zosyn, will continue   - CTM     Hematology/Oncology:  - H/H stable  - Lovenox DVT " prophylaxis      FEN/GI  - Hold TFs for emesis   - Postoperative hypoparathyroidism   - Hypocalcemia on initial admission     - Restart tums QID, calcitriol   - Replace electrolytes as needed to keep K>4, Mg>2  - Bowel reg  - Protonix for GI prophylaxis, GERD  - STRICT nausea control ondansetron, phenergan       MSK  - Out of bed as tolerated  - STSG donor site healed     Dispo:  -Continue ICU care, will discuss with staff regarding ultimate plan         VTE Risk Mitigation (From admission, onward)           Ordered     enoxaparin injection 40 mg  Daily         11/23/22 1654     IP VTE HIGH RISK PATIENT  Once         11/23/22 1654     Place sequential compression device  Until discontinued         11/23/22 1654                    Jo Ann Bryant MD  Otorhinolaryngology-Head & Neck Surgery  [facility] - Surgical Intensive Care

## 2022-11-23 NOTE — DISCHARGE SUMMARY
[facility]  Discharge Summary  Patient Name: Cyrus Batres Jr. MRN: [id number]   Patient Class: OP- Observation  Length of Stay: 0   Admission Date: 11/22/2022  1:20 AM Attending Physician: Dean Jose MD   Primary Care Provider: Maico Patterson MD Face-to-Face encounter date: 11/23/2022   Chief Complaint: Shortness of Breath (Started last night, feel like he is working hard to breathe. Pt has stoma. Pt is labored with retractions)    Date of Discharge: 11/23/2022  Discharge Disposition:Critical Access Hospital*    Condition: Stable       Reason for Hospitalization   Laryngeal abscess      Patient Active Problem List   Diagnosis    SOB (shortness of breath)    HTN (hypertension)    Type 2 diabetes mellitus, without long-term current use of insulin    Laryngeal cancer    Hypocalcemia    Hyperbilirubinemia    Elevated transaminase level    Hyponatremia    PEG (percutaneous endoscopic gastrostomy) adjustment/replacement/removal    Hypothyroidism    Pseudomonas aeruginosa infection    Sepsis following procedure    Deep postoperative wound infection    Abscess of neck    Dehydration    Leukocytosis    Wound dehiscence, surgical, initial encounter    Pharyngocutaneous fistula    Protein malnutrition       Brief History of Present Illness     HPI: A 72-year-old  male with history of hypertension, hypothyroidism, diabetes type 2, GERD , laryngeal cancer and malignant neoplasm of base of tongue  who [place] with lateral thigh [place] and [place] [facility] [place]  [place]  presents to the ED on account of shortness of breath and heavy mucus production.    Patient is awake and alert but currently nonverbal.  There is no family in the room to give the history.  Per ED physician's note, patient was discharged home 2 days ago from [facility].  Patient has a laryngectomy tube in place and patient is not on home oxygen.  Wife was the historian and states that  "since arrival at home from the hospital, patient has been breathing heavy and patient felt short of breath. He has also been coughing, choking and vomited twice last night. there was also associated coughing with production of bloody mucus.  Patient denies any chest pain, neck or throat pain, fevers chills or sweats or  drainage from incision sites.  [place] was [place] [facility] [place] due to diversion patient would be admitted to [facility] pending acceptance.  On presentation to the ED, CT imaging showed Irregular fluid collection in the submandibular space as described above suspicious for phlegmon / abscess.  WBC elevated at 18.58, sodium 128, calcium 7.1, bilirubin 4.8, AST/ALT 9.7/233.  Lactate 1.2.  ENT Dr. Russo was consulted and evaluated the case and stated that while there is certainly dramatic postsurgical change with the large flap in place and air subcutaneously which could very much be postsurgical or even represent fistula, it is not definitive for abscess.  He also stated that he did not see an indication open his neck at the moment as there is no indication of any airway compromise with a longstanding trachea stoma in place with a laryngectomy tube in good position.  Patient was given IV antibiotics in the ED, pulmonologist has been consulted.    Hospital Course By Problem with Pertinent Findings     Patient blood culture positive for Pseudomonas bacteremia.  He was seen by ENT who does not think this is an abscess but a salivary fistula.  He was started on IV Zosyn.  He had no evidence of airway compromise Given this finding ENT believes patient will be better served at [facility] with his operating physician.  He will be transferred there today.        Physical Exam  BP (!) 165/73   Pulse 86   Temp 97.4 °F (36.3 °C) (Axillary)   Resp 19   Ht 5' 6" (1.676 m)   Wt 62.2 kg (137 lb 2 oz)   SpO2 100%   BMI 22.13 kg/m²   Vitals reviewed.    PHYSICAL EXAM  GENERAL: Older patient in " no distress.  HEENT: Pupils equal and reactive. Extraocular movements intact. Nose intact. Pharynx moist.  NECK: Supple. Tracheostomy tube in place.  Radiation changes and flap to right and left neck.  Some drainage present.  HEART: Regular rate and rhythm. No murmur or gallop auscultated.  LUNGS: Clear to auscultation and percussion. Lung excursion symmetrical. No change in fremitus. No adventitial noises.  ABDOMEN: PEG tube in place. Bowel sounds present. Non-tender, no masses palpated.  : Normal anatomy.  EXTREMITIES: Normal muscle tone and joint movement, no cyanosis or clubbing. Healing graft site.  LYMPHATICS: No adenopathy palpated, no edema.  SKIN: Dry, intact, no lesions.   NEURO: Cranial nerves II-XII intact. Motor strength 5/5 bilaterally, upper and lower extremities.  PSYCH: Appropriate affect    Following labs were Reviewed   Recent Labs   Lab 11/23/22  0311 11/23/22  1342   WBC 9.45  --    HGB 7.9* 8.0*   HCT 25.2* 24.2*     --    CALCIUM 6.8* 7.0*   ALBUMIN 2.4*  --    PROT 5.8*  --    * 130*   K 3.0* 4.0   CO2 21* 22*    98   BUN 24* 19   CREATININE 0.7 0.6   ALKPHOS 767*  --    *  --    AST 82*  --    BILITOT 5.0*  --      No results found for: POCTGLUCOSE         Microbiology Results (last 7 days)       Procedure Component Value Units Date/Time    Aerobic culture [[id number]] Collected: 11/22/22 2223    Order Status: Completed Specimen: Endotracheal from Neck Updated: 11/23/22 1120     Aerobic Bacterial Culture No growth    Blood culture [[id number]] Collected: 11/23/22 0943    Order Status: Sent Specimen: Blood from Line, Port A Cath Updated: 11/23/22 0949    Culture, Respiratory with Gram Stain [[id number]] Collected: 11/22/22 2153    Order Status: Completed Specimen: Respiratory from Tracheal Aspirate Updated: 11/23/22 0929     Respiratory Culture Insufficient incubation, culture in progress     Gram Stain (Respiratory) <10 epithelial cells per low power field.     Gram  Stain (Respiratory) Few WBC's     Gram Stain (Respiratory) Rare Gram positive cocci    Blood Culture #2 **CANNOT BE ORDERED STAT** [[id number]] Collected: 11/22/22 0557    Order Status: Completed Specimen: Blood from Peripheral, Antecubital, Left Updated: 11/23/22 0916     Blood Culture, Routine Gram stain aer bottle: Gram negative rods      Results called to and read back by:Tamia Alfaro RN-3ICU;  11/23/2022      09:15 CJD    Rapid Organism ID by PCR (from Blood culture) [[id number]]  (Abnormal) Collected: 11/22/22 0557    Order Status: Completed Updated: 11/23/22 0908     Enterococcus faecials Not Detected     Enterococcus faecium Not Detected     Listeria Monocytogenes Not Detected     Staphylococcus spp. Not Detected     Staphylococcus aureus Not Detected     Staphylococcus epidermidis Not Detected     Staphylococcus lugdunensis Not Detected     Streptococcus species Not Detected     Streptococcus agalactiae Not Detected     Streptococcus pneumoniae Not Detected     Streptococcus pyogenes Not Detected     Acinetobacter calcoaceticus/baumannii complex Not Detected     Bacteroides fragilis Not Detected     Enterobacerales Not Detected     Enterobacter cloacae complex Not Detected     Escherichia Not Detected     Klebsiella aerogenes Not Detected     Klebsiella oxytoca Not Detected     Klebsiella pneumoniae group Not Detected     Proteus Not Detected     Salmonella sp Not Detected     Serratia marcescens Not Detected     Haemophilus influenzae Not Detected     Neisseria meningtidis Not Detected     Pseudomonas aeruginosa Detected     Stenotrophomonas maltophilia Not Detected     Candida albicans Not Detected     Candida auris Not Detected     Candida glabrata Not Detected     Candida krusei Not Detected     Candida Parapsilosis Not Detected     Candida Tropicalis Not Detected     Cryptococcus neoformans/gattii Not Detected     CTX-M Gene Not Detected     IMP Gene Not Detected     KPC  Not Detected     mcr-1  Test  not applicable     mec A/C Test not applicable     mec A/C and MREJ (MRSA) Test not applicable     NDM Not Detected     OXA-48-like Test not applicable     van A/B Test not applicable     VIM Not Detected    Blood Culture #1 **CANNOT BE ORDERED STAT** [[id number]] Collected: 11/22/22 0557    Order Status: Completed Specimen: Blood from Peripheral, Hand, Right Updated: 11/23/22 0721     Blood Culture, Routine Gram stain aer bottle:  Gram negative rods      Results called to and read back by: Tamia Alfaro RN-3ICU;      11/23/2022  07:20 CJD    MRSA Screen by PCR [[id number]] Collected: 11/22/22 2225    Order Status: Completed Specimen: Nasopharyngeal Swab from Nasal Updated: 11/23/22 0017     MRSA SCREEN BY PCR Negative          US Soft Tissue Head Neck Thyroid   Final Result      X-Ray Abdomen AP 1 View   Final Result      No acute intra-abnormality identified.         Electronically signed by: Nael Hui MD   Date:    11/22/2022   Time:    19:43      CT Soft Tissue Neck With Contrast   Final Result      X-Ray Chest AP Portable   Final Result          No results found for this or any previous visit.      Consultants and Procedures   Consultants:  Consults (From admission, onward)          Status Ordering Provider     Inpatient consult to Registered Dietitian/Nutritionist  Once        Provider:  (Not yet assigned)    Acknowledged VINNY RUSSO     Inpatient consult to ENT  Once        Provider:  Vinny Russo MD    Completed UBALDO ORLANDO     Inpatient consult to Registered Dietitian/Nutritionist  Once        Provider:  (Not yet assigned)    Completed UBALDO ORLANDO     Inpatient consult to Infectious Diseases  Once        Provider:  Fariha Porter MD    Completed NESS COLES     Inpatient consult to Registered Dietitian/Nutritionist  Once        Provider:  (Not yet assigned)    Completed NESS COLES              Discharge Information:       Discharge Medications:      Medication List      You have not been prescribed any medications.           I spent 45  minutes preparing the discharge including reviewing records from previous encounters, preparation of discharge summary, assessing and final examination of the patient, discharge medicine reconciliation, discussing plan of care, follow up and education and prescriptions.       Dean Donald  [facility] Hospitalist  11/23/2022

## 2022-11-23 NOTE — CONSULTS
"[facility]  Adult Nutrition   Consult Note (Nutrition Support Management)    SUMMARY     Recommendations  Recommendation/Intervention:   1) For bolus feeds, recommed 360ml Pivot 1.5 4x/day with 30ml water flushes before and after each feed. TF will provide 2160kcal, 135gm Protein, 1080ml free fl.    2) RD will monitor TF rate/tolerance, labs, weigth and will make recommendations PRN.    Goals: Patient to meet >75% of EEN/EPN via enteral feeds to promote wound healing  Nutrition Goal Status: new  Communication of RD Recs: reviewed with RN    Dietitian Rounds Brief  Patient unable to talk but used gestures to communicate and wife at bedside. Per wife, pt takes Jevity 1.5 at home, 6 bottles daily. On TF since January and noted that had gout 3 times since being on Jevity 1.5. Pt administers his TF and water flushes- unsure how much he gets. [place] Nestle TF [place] [facility] [place] had diarrhea with it and was started on Banatrol. Also tried [employer] previously and had heart burn with it. We discussed benefits of Pivot 1.5 to aid in wound healing and patient agreed to try. Patient did have vomiting PTA.     Diet order:   Current Diet Order: NPO     Evaluation of Received Nutrient/Fluid Intake  Energy Calories Required: not meeting needs  Protein Required: not meeting needs     % Intake of Estimated Energy Needs: 0%  % Meal Intake: NPO      Intake/Output Summary (Last 24 hours) at 11/23/2022 1150  Last data filed at 11/23/2022 0800  Gross per 24 hour   Intake 1389.17 ml   Output 1100 ml   Net 289.17 ml        Anthropometrics  Temp: 97.4 °F (36.3 °C)  Height: 5' 6" (167.6 cm)  Height (inches): 66 in  Weight: 62.2 kg (137 lb 2 oz)  Weight (lb): 137.13 lb  Ideal Body Weight (IBW), Male: 142 lb  BMI (Calculated): 22.1  BMI Grade: 18.5-24.9 - normal       Estimated/Assessed Needs  Weight Used For Calorie Calculations: 62 kg (136 lb 11 oz)  Energy Calorie Requirements (kcal): 3991-1843 (30-35 )  Energy " Need Method: Kcal/kg  Protein Requirements: 75-93gm (1.2-1.5gm/kg)  Weight Used For Protein Calculations: 62 kg (136 lb 11 oz)     Estimated Fluid Requirement Method: RDA Method  RDA Method (mL): 1860       Reason for Assessment  Reason For Assessment: consult, new tube feeding  Diagnosis:  (SOB)  Relevant Medical History: hypertension, hypothyroidism, diabetes type 2, GERD , laryngeal cancer and malignant neoplasm of base of tongue  who underwent a total glossectomy ph[place]ctomy with lateral thigh reconstruction and skin graft [place] [facility] [place]    Nutrition/Diet History  Food Allergies: NKFA  Factors Affecting Nutritional Intake: NPO, difficulty/impaired swallowing  Nutrition Support Formula Prior to Admit: Jevity 1.5  Nutrition Support Rate Prior to Admit: 480 (ml)  Nutrtion Support Frequency Prior to Admit: ml 3x/daily  Nutrition Support Provision Prior to Admit: 2160kcal, 92gm Protein, 1094ml free fl. Water flushes vary.    Nutrition Risk Screen  Nutrition Risk Screen: tube feeding or parenteral nutrition             Weight History:  Wt Readings from Last 10 Encounters:   11/23/22 62.2 kg (137 lb 2 oz)        Lab/Procedures/Meds: Pertinent Labs/Meds Reviewed    Medications:Pertinent Medications Reviewed  Scheduled Meds:   albuterol-ipratropium  3 mL Nebulization Q8H    chlorhexidine  15 mL Mouth/Throat BID    levothyroxine  100 mcg Oral Before breakfast    mupirocin   Nasal BID    pantoprazole  40 mg Intravenous Daily    piperacillin-tazobactam (ZOSYN) IVPB  3.375 g Intravenous Q8H    sodium chloride 0.9%  1,000 mL Intravenous Once     Continuous Infusions:   lactated ringers 100 mL/hr at 11/23/22 0600     PRN Meds:.acetaminophen, calcium gluconate IVPB, calcium gluconate IVPB, calcium gluconate IVPB, dextrose 10%, dextrose 10%, dextrose 10%, glucagon (human recombinant), hydrALAZINE, insulin aspart U-100, magnesium oxide, magnesium oxide, morphine, ondansetron, potassium bicarbonate, potassium bicarbonate,  potassium bicarbonate, potassium, sodium phosphates, potassium, sodium phosphates, potassium, sodium phosphates, sodium chloride 0.9%    Labs: Pertinent Labs Reviewed  Clinical Chemistry:  Recent Labs   Lab 11/23/22 0311   *   K 3.0*      CO2 21*      BUN 24*   CREATININE 0.7   CALCIUM 6.8*   PROT 5.8*   ALBUMIN 2.4*   BILITOT 5.0*   ALKPHOS 767*   AST 82*   *   ANIONGAP 9   MG 1.8     CBC:   Recent Labs   Lab 11/23/22 0311   WBC 9.45   RBC 2.67*   HGB 7.9*   HCT 25.2*      MCV 94   MCH 29.6   MCHC 31.3*     Lipid Panel:  No results for input(s): CHOL, HDL, LDLCALC, TRIG, CHOLHDL in the last 168 hours.  Cardiac Profile:  No results for input(s): BNP, CPK, CPKMB, TROPONINI, CKTOTAL in the last 168 hours.  Inflammatory Labs:  Recent Labs   Lab 11/22/22 1857 11/23/22 0311   CRP 22.30* 21.01*     Diabetes:  Recent Labs   Lab 11/22/22 1857   HGBA1C 5.8     Thyroid & Parathyroid:  No results for input(s): TSH, FREET4, D2MTIXE, O7AYLSV, THYROIDAB in the last 168 hours.    Monitor and Evaluation  Food and Nutrient Intake: enteral nutrition intake  Food and Nutrient Adminstration: enteral and parenteral nutrition administration  Physical Activity and Function: nutrition-related ADLs and IADLs  Anthropometric Measurements: weight change  Biochemical Data, Medical Tests and Procedures: electrolyte and renal panel, gastrointestinal profile, glucose/endocrine profile  Nutrition-Focused Physical Findings: overall appearance     Nutrition Risk  Level of Risk/Frequency of Follow-up: high     Nutrition Follow-Up  Yes      Magda Akins, JAMIR 11/23/2022 11:50 AM

## 2022-11-23 NOTE — PLAN OF CARE
[facility]  Initial Discharge Assessment       Primary Care Provider: Maico Patterson MD    Admission Diagnosis: Infection of deep incisional surgical site after procedure, initial encounter [T81.42XA]    Admission Date: 11/22/2022  Expected Discharge Date:     Discharge Barriers Identified: (P) None    CM met with patient and spouse Janel Batres [phone] at bsd to complete discharge assessment. Info verified on facesheet. Pt denies DME, HH, HD and coumadin usage. Pt independent in all ADLs. Family will be transport upon discharge. No needs identified at this time. CM to continue to follow.     Payor: [employer] MEDICARE / Plan: [employer] MEDICARE HMO / Product Type: Capitation /     Extended Emergency Contact Information  Primary Emergency Contact: Janel Batres  Mobile Phone: [phone]  Relation: Spouse  Preferred language: English   needed? No    Discharge Plan A: (P) Home with family  Discharge Plan B: (P) Home with family    No Pharmacies Listed    Initial Assessment (most recent)       Adult Discharge Assessment - 11/23/22 0902          Discharge Assessment    Assessment Type Discharge Planning Assessment (P)      Confirmed/corrected address, phone number and insurance Yes (P)      Confirmed Demographics Correct on Facesheet (P)      Source of Information patient;family (P)      Does patient/caregiver understand observation status Yes (P)      Communicated CHIARA with patient/caregiver Date not available/Unable to determine (P)      Reason For Admission infection of deep incision (P)      Lives With spouse (P)      Facility Arrived From: home (P)      Do you expect to return to your current living situation? Yes (P)      Do you have help at home or someone to help you manage your care at home? Yes (P)      Who are your caregiver(s) and their phone number(s)? Janel Ryana [phone] (P)      Prior to hospitilization cognitive status: Alert/Oriented (P)      Current cognitive status:  Alert/Oriented (P)      Walking or Climbing Stairs Difficulty none (P)      Dressing/Bathing Difficulty none (P)      Equipment Currently Used at Home none (P)      Readmission within 30 days? No (P)      Patient currently being followed by outpatient case management? No (P)      Do you currently have service(s) that help you manage your care at home? No (P)      Do you take prescription medications? Yes (P)      Do you have prescription coverage? Yes (P)      Coverage [employer] Managed Medicare (P)      Do you have any problems affording any of your prescribed medications? No (P)      Is the patient taking medications as prescribed? yes (P)      Who is going to help you get home at discharge? Janel Batres [phone] (P)      How do you get to doctors appointments? family or friend will provide (P)      Are you on dialysis? No (P)      Do you take coumadin? No (P)      Discharge Plan A Home with family (P)      Discharge Plan B Home with family (P)      DME Needed Upon Discharge  none (P)      Discharge Plan discussed with: Patient;Spouse/sig other (P)      Name(s) and Number(s) Jaenl Batres [phone] (P)      Discharge Barriers Identified None (P)         Physical Activity    On average, how many days per week do you engage in moderate to strenuous exercise (like a brisk walk)? Patient refused (P)      On average, how many minutes do you engage in exercise at this level? Patient refused (P)         Financial Resource Strain    How hard is it for you to pay for the very basics like food, housing, medical care, and heating? Not very hard (P)         Housing Stability    In the last 12 months, was there a time when you were not able to pay the mortgage or rent on time? No (P)      In the last 12 months, how many places have you lived? 1 (P)      In the last 12 months, was there a time when you did not have a steady place to sleep or slept in a shelter (including now)? No (P)         Transportation Needs    In the past  12 months, has lack of transportation kept you from medical appointments or from getting medications? No (P)      In the past 12 months, has lack of transportation kept you from meetings, work, or from getting things needed for daily living? No (P)         Food Insecurity    Within the past 12 months, you worried that your food would run out before you got the money to buy more. Never true (P)         Stress    Do you feel stress - tense, restless, nervous, or anxious, or unable to sleep at night because your mind is troubled all the time - these days? To some extent (P)         Social Connections    In a typical week, how many times do you talk on the phone with family, friends, or neighbors? Patient refused (P)      How often do you get together with friends or relatives? Patient refused (P)      How often do you attend Catholic or Sabianist services? Patient refused (P)      Do you belong to any clubs or organizations such as Catholic groups, unions, fraternal or athletic groups, or school groups? Patient refused (P)      How often do you attend meetings of the clubs or organizations you belong to? Patient refused (P)      Are you , , , , never , or living with a partner?  (P)         Alcohol Use    Q1: How often do you have a drink containing alcohol? Never (P)      Q2: How many drinks containing alcohol do you have on a typical day when you are drinking? Patient does not drink (P)      Q3: How often do you have six or more drinks on one occasion? Never (P)         Relationship/Environment    Name(s) of Who Lives With Patient Janel Batres [phone] (P)

## 2022-11-23 NOTE — ASSESSMENT & PLAN NOTE
Insulin sliding scale  Regular Accu-Chek  Patient's FSGs are uncontrolled due to hyperglycemia on current medication regimen.  Last A1c reviewed- No results found for: LABA1C, HGBA1C  Most recent fingerstick glucose reviewed- No results for input(s): POCTGLUCOSE in the last 24 hours.  Current correctional scale  Low  Maintain anti-hyperglycemic dose as follows-   Antihyperglycemics (From admission, onward)    None        Hold Oral hypoglycemics while patient is in the hospital.

## 2022-11-23 NOTE — TELEPHONE ENCOUNTER
----- Message from Zen Garcia sent at 11/23/2022  9:10 AM CST -----  Contact: [phone]  Type: Needs Medical Advice  Who Called:  Pt   Best Call Back Number: [phone]    Additional Information: Tamra called in from [facility] with a consult on this pt

## 2022-11-23 NOTE — ASSESSMENT & PLAN NOTE
Plan for aggressive wound care.  Keep the dry areas of wound moist with Aquaphor and nonadherent dressings the right peristomal dehiscence should be packed with wet-to-dry dressings twice daily and changed over to calcium alginate dressings when available.  Wound care consultation.

## 2022-11-23 NOTE — ED NOTES
Bed ready per computer, attempted to call report. Floor states room not ready, call back after 7.

## 2022-11-23 NOTE — PLAN OF CARE
Problem: Feeding Intolerance (Enteral Nutrition)  Goal: Feeding Tolerance  Intervention: Prevent and Manage Feeding Intolerance  Flowsheets (Taken 11/23/2022 1159)  Nutrition Support Management: (Bolus TF ordered: Pivot 1.5, 360ml 4x/daily with 30ml water flushes before and after each feed.) --     Problem: Impaired Wound Healing  Goal: Optimal Wound Healing  Intervention: Promote Wound Healing  Flowsheets (Taken 11/23/2022 1158)  Oral Nutrition Promotion: calorie-dense liquids provided

## 2022-11-23 NOTE — H&P
[facility] - Emergency Dept  Hospital Medicine  History & Physical    Patient Name: Cyrus Batres Jr.  MRN: [id number]  Patient Class: OP- Observation  Admission Date: 11/22/2022  Attending Physician: Roseanne Andino MD  Primary Care Provider: Maico Patterson MD         Patient information was obtained from past medical records, ER records and primary team.     Subjective:     Principal Problem:<principal problem not specified>    Chief Complaint:   Chief Complaint   Patient presents with    Shortness of Breath     Started last night, feel like he is working hard to breathe. Pt has stoma. Pt is labored with retractions        HPI: A 72-year-old  male with history of hypertension, hypothyroidism, diabetes type 2, GERD , laryngeal cancer and malignant neoplasm of base of tongue  who underwent a total glossectomy ph[place]ctomy with lateral thigh reconstruction and skin graft [place] [facility] [place]  He  presents to the ED on account of shortness of breath and heavy mucus production.    Patient is awake and alert but currently nonverbal.  There is no family in the room to give the history.  Per ED physician's note, patient was discharged home 2 days ago from [facility].  Patient has a laryngectomy tube in place and patient is not on home oxygen.  Wife was the historian and states that since arrival at home from the hospital, patient has been breathing heavy and patient felt short of breath. He has also been coughing, choking and vomited twice last night. there was also associated coughing with production of bloody mucus.  Patient denies any chest pain, neck or throat pain, fevers chills or sweats or  drainage from incision sites.  Patient was worked for transfer back to [facility] [place] however due to diversion patient would be admitted to [facility] pending acceptance.  On presentation to the ED, CT imaging showed Irregular fluid collection in the submandibular space as described above suspicious for phlegmon /  abscess.  WBC elevated at 18.58, sodium 128, calcium 7.1, bilirubin 4.8, AST/ALT 9.7/233.  Lactate 1.2.  ENT Dr. Russo was consulted and evaluated the case and stated that while there is certainly dramatic postsurgical change with the large flap in place and air subcutaneously which could very much be postsurgical or even represent fistula, it is not definitive for abscess.  He also stated that he did not see an indication open his neck at the moment as there is no indication of any airway compromise with a longstanding trachea stoma in place with a laryngectomy tube in good position.  Patient was given IV antibiotics in the ED, pulmonologist has been consulted.             History reviewed. No pertinent past medical history.    History reviewed. No pertinent surgical history.    Review of patient's allergies indicates:   Allergen Reactions    Lovastatin Itching       No current facility-administered medications on file prior to encounter.     No current outpatient medications on file prior to encounter.     Family History    None       Tobacco Use    Smoking status: Not on file    Smokeless tobacco: Not on file   Substance and Sexual Activity    Alcohol use: Not on file    Drug use: Not on file    Sexual activity: Not on file     Review of Systems   Unable to perform ROS: Patient nonverbal   Objective:     Vital Signs (Most Recent):  Temp: 98.1 °F (36.7 °C) (11/22/22 0124)  Pulse: 89 (11/22/22 1530)  Resp: 17 (11/22/22 1530)  BP: 109/66 (11/22/22 1530)  SpO2: 99 % (11/22/22 1530) Vital Signs (24h Range):  Temp:  [98.1 °F (36.7 °C)] 98.1 °F (36.7 °C)  Pulse:  [] 89  Resp:  [17-25] 17  SpO2:  [98 %-100 %] 99 %  BP: (100-119)/(58-74) 109/66     Weight: 63.5 kg (140 lb)  Body mass index is 22.6 kg/m².    Physical Exam  Vitals and nursing note reviewed.   HENT:      Mouth/Throat:      Comments: Tracheal in-situ  Eyes:      Pupils: Pupils are equal, round, and reactive to light.   Cardiovascular:      Rate and  Rhythm: Normal rate.      Pulses: Normal pulses.   Pulmonary:      Effort: Pulmonary effort is normal.   Abdominal:      Palpations: Abdomen is soft.   Musculoskeletal:      Right lower leg: No edema.      Left lower leg: No edema.   Neurological:      Mental Status: He is alert. Mental status is at baseline.   Psychiatric:         Mood and Affect: Mood normal.         CRANIAL NERVES     CN III, IV, VI   Pupils are equal, round, and reactive to light.     Significant Labs: All pertinent labs within the past 24 hours have been reviewed.  Bilirubin:   Recent Labs   Lab 11/22/22 0225   BILITOT 4.8*     Blood Culture:   Recent Labs   Lab 11/22/22 0557   LABBLOO No Growth to date  No Growth to date     CBC:   Recent Labs   Lab 11/22/22 0225   WBC 18.58*   HGB 9.3*   HCT 28.0*        CMP:   Recent Labs   Lab 11/22/22 0225   *   K 3.7   CL 95   CO2 23   *   BUN 33*   CREATININE 0.8   CALCIUM 7.1*   PROT 6.9   ALBUMIN 3.1*   BILITOT 4.8*   ALKPHOS 741*   AST 97*   *   ANIONGAP 10     Cardiac Markers: No results for input(s): CKMB, MYOGLOBIN, BNP, TROPISTAT in the last 48 hours.  Coagulation: No results for input(s): PT, INR, APTT in the last 48 hours.  Lactic Acid:   Recent Labs   Lab 11/22/22 0551   LACTATE 1.2     Magnesium: No results for input(s): MG in the last 48 hours.  Respiratory Culture: No results for input(s): GSRESP, RESPIRATORYC in the last 48 hours.  Troponin:   Recent Labs   Lab 11/22/22 0225   TROPONINIHS 17.6*     Urine Studies: No results for input(s): COLORU, APPEARANCEUA, PHUR, SPECGRAV, PROTEINUA, GLUCUA, KETONESU, BILIRUBINUA, OCCULTUA, NITRITE, UROBILINOGEN, LEUKOCYTESUR, RBCUA, WBCUA, BACTERIA, SQUAMEPITHEL, HYALINECASTS in the last 48 hours.    Invalid input(s): WRIGHTSUR    Significant Imaging: I have reviewed all pertinent imaging results/findings within the past 24 hours.  Imaging Results              CT Soft Tissue Neck With Contrast (Final result)  Result time  11/22/22 05:25:50      Final result by Segundo Lagos MD (11/22/22 05:25:50)                   Narrative:    EXAM DESCRIPTION:  CT SOFT TISSUE NECK WITH CONTRAST    CLINICAL HISTORY:  72 years  Male  Soft tissue swelling, infection suspected, neck xray done; Neck mass, nonpulsatile; Head/neck cancer, assess treatment response    TECHNIQUE:  Contrast enhanced CT neck soft tissue with coronal and sagittal reformats. All CT scans at this facility use dose modulation, iterative reconstruction, and/or weight based dosing when appropriate to reduce radiation dose to as low as reasonably achievable.    COMPARISON: None    FINDINGS:    Extensive postsurgical changes in the neck.    Irregular fluid collection with gas in the submandibular space at the level just posterior to the mandibular symphysis measuring approximately 4.7 x 1.1 x 1.3 cm (TV x AP x SI). There are foci of gas    There is fluid in the anterior soft tissues of the neck on the right extending from the level of C5 C6-7 T1 just above the tracheostomy tube that measures approximately 4.9 x 2.3 x 3.9 cm (TV x AP x SI). There is curvilinear density located behind this collection on the right.    Diffuse inflammatory changes with soft tissue gas at the level of the tracheostomy tube insertion to the left    There is a tubing in the esophagus that contains small amount of fluid in its superior aspect.    Diffuse soft tissue swelling and inflammatory changes to the neck on both sides.    Atherosclerosis of the carotid vessels.  Multilevel degenerative changes.  Left greater than right opacification of the bilateral maxillary sinuses with air-fluid levels on the left. Mild ethmoid sinus mucosal thickening. Minimal air-fluid levels in the bilateral sphenoid sinuses.    IMPRESSION:  1. Irregular fluid collection in the submandibular space as described above suspicious for phlegmon / abscess.  2. Irregular fluid collection above the tracheostomy tube as described  above may represent combination of hematoma, phlegmon and developing abscess. Indeterminate curvilinear density behind this collection on the right and not within the collection.    THIS REPORT CONTAINS FINDINGS THAT MAY BE CRITICAL TO PATIENT CARE: The findings were verbally discussed via telephone conference with Anne Jacobs MD on 11/22/2022 at 5:21 AM CST.    Electronically signed by:  Segundo Lagos MD  11/22/2022 5:25 AM CST Workstation: [id number]                                     X-Ray Chest AP Portable (Final result)  Result time 11/22/22 08:21:30      Final result by Shira Menendez MD (11/22/22 08:21:30)                   Narrative:    Portable chest x-ray at 2:25 AM    Clinical history shortness of breath    There is a tracheostomy tube in place. There is a left subclavian vein Port-A-Cath.  The cardiomediastinal silhouette is normal in size. The lungs are clear. There are no acute osseous abnormalities.    IMPRESSION: No acute pulmonary process    Electronically signed by:  Shira Menendez MD  11/22/2022 8:21 AM CST Workstation: [id number]                                      Assessment/Plan:     Shortness of breath  Status post laryngectomy  Questionable abscess seen on CT  Trend CRP  Blood cultures, sputum cultures, wound cultures  Respiratory on board  Pulmonology consulted  Continue IV antibiotics, consult ID  Breathing treatment      Hypocalcemia  Corrected at 7.8  Ionized calcium  Correct with IV calcium gluconate      Hyponatremia  Monitor sodium levels  Continue IV NS      Hyperbilirubinemia  Elevated  IV hydration  Monitor levels      Elevated transaminase level  Elevated  Secondary to PEG feeds?  IV hydration  Monitor levels      Type 2 diabetes mellitus, without long-term current use of insulin  Insulin sliding scale  Regular Accu-Chek  Patient's FSGs are uncontrolled due to hyperglycemia on current medication regimen.  Last A1c reviewed- No results found for: LABA1C,  HGBA1C  Most recent fingerstick glucose reviewed- No results for input(s): POCTGLUCOSE in the last 24 hours.  Current correctional scale  Low  Maintain anti-hyperglycemic dose as follows-   Antihyperglycemics (From admission, onward)      None          Hold Oral hypoglycemics while patient is in the hospital.    Laryngeal cancer  Status post laryngectomy  ENT consulted  Wound care      PEG (percutaneous endoscopic gastrostomy) adjustment/replacement/removal  Consult dietary for PEG tube feeds  KUB to evaluate due to patient's symptoms      Hypothyroidism  Resume home medication of levothyroxine 100 mcg daily      HTN (hypertension)  Normotensive  Hold home blood pressure medication of losartan 100 mg daily      VTE Risk Mitigation (From admission, onward)           Ordered     Place sequential compression device  Until discontinued         11/22/22 4264                       Roseanne Andino MD  Department of Hospital Medicine   [facility] - Emergency Dept

## 2022-11-23 NOTE — CONSULTS
[facility]  Otorhinolaryngology-Head & Neck Surgery  Consult Note    Patient Name: Cyrus Batres Jr.  MRN: [id number]  Code Status: Full Code  Admission Date: 11/22/2022  Hospital Length of Stay: 0 days  Attending Physician: Dean Jose MD  Primary Care Provider: Maico Patterson MD    Patient information was obtained from patient, spouse/SO, past medical records, ER records and primary team.     Inpatient consult to ENT  Consult performed by: Vinny Russo MD  Consult ordered by: Roseanne Andino MD        Subjective:     Chief Complaint/Reason for Admission: tachypnea, fatigue, s/p radical neck surgery     History of Present Illness: 72-year-old nonsmoker male with a history of radiation failure and subsequent laryngectomy with right anterior lateral thigh flap reconstruction with recurrence in the tongue base who is now status post glossectomy and tubed pharyngeal flap reconstruction from left anterolateral thigh flap 11/09/2022 with Dr. Jesse James (extirpation) and Dr. Alise Hart (reconstruction) discharged 11/20/2022.  Patient was doing well Sunday of discharge started feeling a bit worse on Monday.  Wife noticed he was a bit tachypneic and just not feeling himself with worsening coughing but no gross fever.  Seen in the ER CT scanning completed with findings of diffuse surgical changes with air in a few areas of the georgie hypopharynx reconstruction without any distinct enhancing abscess collection.  Significant elevation of white blood cell count at 18,000.  Chest x-ray reported as clear.  Admitted to ICU for IV antibiotics and close observation; unable to be transferred to [facility] due to diversion.      Medications:  Continuous Infusions:   lactated ringers 100 mL/hr at 11/23/22 0600     Scheduled Meds:   albuterol-ipratropium  3 mL Nebulization Q8H    chlorhexidine  15 mL Mouth/Throat BID    levothyroxine  100 mcg Oral Before breakfast    mupirocin   Nasal BID     pantoprazole  40 mg Intravenous Daily    piperacillin-tazobactam (ZOSYN) IVPB  3.375 g Intravenous Q8H    sodium chloride 0.9%  1,000 mL Intravenous Once     PRN Meds:acetaminophen, calcium gluconate IVPB, calcium gluconate IVPB, calcium gluconate IVPB, dextrose 10%, dextrose 10%, dextrose 10%, glucagon (human recombinant), hydrALAZINE, insulin aspart U-100, magnesium oxide, magnesium oxide, morphine, ondansetron, potassium bicarbonate, potassium bicarbonate, potassium bicarbonate, potassium, sodium phosphates, potassium, sodium phosphates, potassium, sodium phosphates, sodium chloride 0.9%     No current facility-administered medications on file prior to encounter.     No current outpatient medications on file prior to encounter.       Review of patient's allergies indicates:   Allergen Reactions    Lovastatin Itching       Past Medical History:   Diagnosis Date    Hypothyroidism 11/22/2022    PEG (percutaneous endoscopic gastrostomy) adjustment/replacement/removal 11/22/2022    Type 2 diabetes mellitus, without long-term current use of insulin 11/22/2022     History reviewed. No pertinent surgical history.  Family History    None       Tobacco Use    Smoking status: Not on file    Smokeless tobacco: Not on file   Substance and Sexual Activity    Alcohol use: Not on file    Drug use: Not on file    Sexual activity: Not on file     Review of Systems   Constitutional:  Positive for fatigue. Negative for chills and fever.   HENT:  Positive for drooling, sore throat, trouble swallowing and voice change (aphonic status post laryngectomy).    Eyes: Negative.    Respiratory:  Positive for cough and choking. Negative for shortness of breath and wheezing.    Cardiovascular: Negative.  Negative for chest pain and leg swelling.   Gastrointestinal:  Positive for nausea and vomiting. Negative for diarrhea.   Endocrine: Negative.    Genitourinary: Negative.    Musculoskeletal: Negative.    Skin: Negative.     Allergic/Immunologic: Negative.    Neurological: Negative.    Hematological: Negative.    Psychiatric/Behavioral: Negative.     Objective:     Vital Signs (Most Recent):  Temp: 97.4 °F (36.3 °C) (11/23/22 0815)  Pulse: 86 (11/23/22 1349)  Resp: 19 (11/23/22 1349)  BP: (!) 165/73 (11/23/22 0500)  SpO2: 100 % (11/23/22 1349)   Vital Signs (24h Range):  Temp:  [97.4 °F (36.3 °C)-98.3 °F (36.8 °C)] 97.4 °F (36.3 °C)  Pulse:  [76-92] 86  Resp:  [17-29] 19  SpO2:  [99 %-100 %] 100 %  BP: (102-165)/(59-73) 165/73     Weight: 62.2 kg (137 lb 2 oz)  Body mass index is 22.13 kg/m².    Date 11/23/22 0700 - 11/24/22 0659   Shift 8479-7378 2956-4702 4838-4150 24 Hour Total   INTAKE   Shift Total(mL/kg)       OUTPUT   Urine(mL/kg/hr) 350   350   Shift Total(mL/kg) 350(5.6)   350(5.6)   Weight (kg) 62.2 62.2 62.2 62.2       Nontoxic appearing resting comfortably in bed with his wife at his bedside.  Face without plethora.  Neck with anterior flap reconstruction superior to the stoma with skin graft mostly intact with some crusting on the left superior stomal area.  Dehiscence of the superficial and immediate deep closure of the right side of the flap to the right neck.  More superiorly both incisions are still intact.  Palpation reveals good if not somewhat slow cap refill of the cutaneously covered portion of the flap.  There was good bleeding and granular tissue of the area of the dehiscence on the right.  No fluctuance.  No expressible purulence.  Superficial purulence only cleaned with gauze.  There was some small area of purulence in the posterior tracheal wall which was suctioned clear palpation of the stomal area did not result in any expression of any drainage or purulence.    Left leg wound intact without cellulitis.  Right and left skin graft areas intact healing dry.    Significant Labs:  WBC   Date Value Ref Range Status   11/23/2022 9.45 3.90 - 12.70 K/uL Final   11/22/2022 18.58 (H) 3.90 - 12.70 K/uL Final      Hemoglobin   Date Value Ref Range Status   11/23/2022 8.0 (L) 14.0 - 18.0 g/dL Final   11/23/2022 7.9 (L) 14.0 - 18.0 g/dL Final   11/22/2022 9.3 (L) 14.0 - 18.0 g/dL Final     Albumin   Date Value Ref Range Status   11/23/2022 2.4 (L) 3.5 - 5.2 g/dL Final   11/22/2022 3.1 (L) 3.5 - 5.2 g/dL Final              Significant Diagnostics:  CT: I have reviewed all pertinent results/findings within the past 24 hours and my personal findings are:  see HPI      Assessment/Plan:     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  As discussed at bedside patient isn't very high risk for same.  Previous surgery and radiation and tubed pharyngeal reconstruction as well as significant ongoing hypoalbuminemia/malnutrition.    Plan for transferred to [facility] when possible.  Supportive care here.      Esophageal tube is in good position for diversion.  Wound care and supportive measures occluding treatment of infection and maximizing nutrition as recommended.    Wound dehiscence, surgical, initial encounter  Plan for aggressive wound care.  Keep the dry areas of wound moist with Aquaphor and nonadherent dressings the right peristomal dehiscence should be packed with wet-to-dry dressings twice daily and changed over to calcium alginate dressings when available.  Wound care consultation.    Pseudomonas aeruginosa infection  On Zosyn dramatic improvement in white blood cell count prolonged antibiotic therapy will be necessary.        Thank you for your consult. I will follow-up with patient. Please contact us if you have any additional questions.    Vinny Russo MD  Otorhinolaryngology-Head & Neck Surgery  [facility]

## 2022-11-23 NOTE — RESPIRATORY THERAPY
11/23/22 0026   Patient Assessment/Suction   Level of Consciousness (AVPU) alert   Respiratory Effort Mild   Expansion/Accessory Muscles/Retractions expansion symmetric   All Lung Fields Breath Sounds clear;diminished   Cough Type assisted   Suction Method tracheal   Suction Pressure (mmHg) -120 mmHg   $ Suction Charges Inline Suction Procedure Stat Charge   Secretions Amount scant   Secretions Color white;vianey   Secretions Characteristics thick   Skin Integrity   $ Wound Care Tech Time 15 min   Area Observed Upper lip;Lower lip;Corner lip   Skin Appearance without discoloration   PRE-TX-O2   O2 Device (Oxygen Therapy) Trach Collar   $ Is the patient on Low Flow Oxygen? Yes   Flow (L/min) 5   Oxygen Concentration (%) 25   SpO2 100 %   Pulse Oximetry Type Continuous   $ Pulse Oximetry - Multiple Charge Pulse Oximetry - Multiple   Pulse 85   Resp (!) 21   /65   Aerosol Therapy   $ Aerosol Therapy Charges Aerosol Treatment   Daily Review of Necessity (SVN) completed   Respiratory Treatment Status (SVN) given   Treatment Route (SVN) in-line;oxygen   Patient Position (SVN) HOB elevated   Post Treatment Assessment (SVN) breath sounds unchanged   Signs of Intolerance (SVN) none   Breath Sounds Post-Respiratory Treatment   Post-treatment Heart Rate (beats/min) 87   Post-treatment Resp Rate (breaths/min) 20   Ready to Wean/Extubation Screen   FIO2<=50 (chart decimal) 0.25   Education   $ Education Bronchodilator;DME Nebulizer;DME Oxygen;Trach Care;15 min   Respiratory Evaluation   $ Care Plan Tech Time 15 min

## 2022-11-23 NOTE — CONSULTS
Consult Note  Infectious Disease    Additional medical record number [id number]    Reason for Consult:  Postoperative infection  HPI: Cyrus Batres Jr. is a 72 y.o. 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  He was discharged and in the early morning hours of 1122 presented to the emergency room with shortness of breath, coughing, choking, 2 episodes of vomiting, production of bloody mucus.  Remained in the emergency room for most of the day yesterday awaiting an ER to ER transferred [facility] which could not be performed.  A CT scan of the neck demonstrated irregular fluid collections in the submandibular space suspicious for phlegmon/abscess.  His chest x-ray was clear.  White blood cells were 18,000, creatinine 0.8 BUN 33, elevation of ALT over a AST, alk-phos 741.  Suctioning through his established tracheostomy relieved him of bloody secretions and improved his breathing comfort.  Blood cultures were obtained a wound culture, superficial neck wound, was submitted.  I discussed the case with Dr. Camp yesterday and recommended Zosyn.  The patient and his wife endorse that he has not had any antibiotics at home recently.  They are unaware at this time of the wound care and or oral care instructions in tended by the ENT surgeon.  Earlier this morning blood cultures became positive for Gram-negative rods and have been identified by [employer] as Pseudomonas aeruginosa.  He is hemodynamically stable, alert, comfortable on T-piece and able to communicate well.    Review of patient's allergies indicates:   Allergen Reactions    Lovastatin Itching   Please see alternate medical record number for more details.  So   Melanocytic nevus    Body mass index (BMI) of 29.0-29.9 in adult    Benign hypertension    Overweight    Paroxysmal atrial  fibrillation    Type 2 diabetes mellitus with diabetic arthropathy, with long-term current use of insulin    Fatty liver disease, nonalcoholic    False positive serological test for hepatitis C    Hyperlipidemia    Type 2 diabetes mellitus with hyperglycemia, without long-term current use of insulin    Gastroesophageal reflux disease without esophagitis    Type 2 diabetes mellitus with hyperglycemia    Vitamin B12 deficiency    Hyperuricemia    Hypovitaminosis D    Proteinuria    On enteral nutrition    Larynx cancer    Dehydration    NSVT (nonsustained ventricular tachycardia)    Hemoptysis    Adverse effect of adrenal cortical steroids, sequela    S/P laryngectomy    Hypocalcemia    Aphonia    Dysphagia, pharyngoesophageal    Acute pain of both shoulders    Bilateral arm weakness    Oral bleeding    Primary cancer of base of tongue    Advanced care planning/counseling discussion    Iron deficiency anemia due to chronic blood loss    Postoperative hypothyroidism    Pressure injury of sacral region, stage 1    Pneumatosis intestinalis    Nausea and vomiting    Neutropenia    Abnormal CT scan, neck               Past Surgical History         Past Surgical History:   Procedure Laterality Date    DIRECT LARYNGOBRONCHOSCOPY N/A 12/27/2021     Procedure: LARYNGOSCOPY, DIRECT, WITH BRONCHOSCOPY;  Surgeon: Flex Espinosa MD;  Location: [facility] OR [address] [place] [address];  Service: ENT;  Laterality: N/A;    DISSECTION OF NECK Bilateral 1/6/2022     Procedure: DISSECTION, NECK;  Surgeon: Jesse James MD;  Location: [facility] OR [address] [place] [address];  Service: ENT;  Laterality: Bilateral;    FLAP PROCEDURE Right 1/6/2022     Procedure: CREATION, FREE FLAP;  Surgeon: Alise Hart MD;  Location: [facility] OR [address] [place] [address];  Service: ENT;  Laterality: Right;  Ischemic start 1351  Ischemic stop 1502    INSERTION OF TUNNELED CENTRAL VENOUS CATHETER (CVC) WITH SUBCUTANEOUS PORT N/A 6/9/2022     Procedure: [id number];  Surgeon: Jesus Viera MD;   Location: [facility] OR;  Service: General;  Laterality: N/A;    LARYNGECTOMY N/A 1/6/2022     Procedure: LARYNGECTOMY;  Surgeon: Jesse James MD;  Location: [facility] OR 2ND FLR;  Service: ENT;  Laterality: N/A;    LARYNGOSCOPY N/A 8/4/2020     Procedure: Suspension microlaryngoscopy with biopsy, possible KTP laser treatment/excision;  Surgeon: Stew Noel MD;  [facility];  Service: ENT;  Laterality: N/A;  Microscope, telescopes, tower, microinstruments, KTP laser, rep conf# [id number] IC 7/28.    LARYNGOSCOPY N/A 3/16/2021     Procedure: Suspension microlaryngoscopy with excision of lesion, possible CO2 laser;  Surgeon: Stew Noel MD;  [facility];  Service: ENT;  Laterality: N/A;  Microscope, telescopes, tower, microinstruments, CO2 laser, rep conf# [id number] IC 3/4.    LARYNGOSCOPY N/A 4/1/2021     Procedure: Suspension microlaryngoscopy with KTP laser excision of lesion;  Surgeon: Stew Noel MD;  [facility];  Service: ENT;  Laterality: N/A;  Microscope, telescopes, tower, microinstruments, 70 degree scope, vocal fold , KTP laser, rep conf# [id number] BC    LARYNGOSCOPY N/A 12/9/2021     Procedure: Suspension microlaryngoscopy with biopsy;  Surgeon: Stew Noel MD;  [facility];  Service: ENT;  Laterality: N/A;  Microscope, telescopes, tower, microinstruments    LARYNGOSCOPY N/A 1/6/2022     Procedure: LARYNGOSCOPY;  Surgeon: Jesse James MD;  [facility];  Service: ENT;  Laterality: N/A;    LARYNGOSCOPY N/A 4/27/2022     Procedure: LARYNGOSCOPY WITH BIOPSY;  Surgeon: Jesse James MD;  [facility];  Service: ENT;  Laterality: N/A;    MICROLARYNGOSCOPY N/A 3/17/2020     Procedure: MICROLARYNGOSCOPY;  Surgeon: Jung Xiao MD;  Location: [facility];  Service: ENT;  Laterality: N/A;  Laser Microlaryngoscopy  NEED TO SCHEDULE LASER from [facility] [id number]    REIMPLANTATION OF  PARATHYROID TISSUE N/A 1/6/2022     Procedure: REIMPLANTATION, PARATHYROID TISSUE;  Surgeon: Jesse James MD;  Location: [facility] OR 2ND FLR;  Service: ENT;  Laterality: N/A;    THYROIDECTOMY   1/6/2022     Procedure: THYROIDECTOMY;  Surgeon: Jesse James MD;  Location: [facility] OR 2ND FLR;  Service: ENT;;    TRACHEOSTOMY N/A 12/27/2021     Procedure: CREATION, TRACHEOSTOMY;  Surgeon: Flex Espinosa MD;  Location: [facility] OR 2ND FLR;  Service: ENT;  Laterality: N/A;      Past Medical History:   Diagnosis Date    Hypothyroidism 11/22/2022    PEG (percutaneous endoscopic gastrostomy) adjustment/replacement/removal 11/22/2022    Type 2 diabetes mellitus, without long-term current use of insulin 11/22/2022     History reviewed. No pertinent surgical history.  Social History     Socioeconomic History    Marital status:      History reviewed. No pertinent family history.      Review of Systems:   No chills, fever, sweats,   No change in vision,    No sinus congestion, purulent nasal discharge,    Postoperative pain in mouth or throat.  Unaware of ENT is intentions for mouth care.    No angina palpitations, syncope   cough, sputum production with suctioning of blood, improved shortness of breath,    nausea, vomiting, which I presume occurred from his stomach through the salivary drainage tube into his oropharynx .  He has epigastric discomfort that he attributes to hunger pain.  No history peptic ulcer disease.  Has some right-sided chest wall soreness from coughing.  No hematemesis.     No swelling of joints, redness of joints, injuries, or new focal pain  No unusual headaches,     History of diabetes, no hemoglobin A1c in epic     No new rashes,    Implants:  Salivary drainage tube, tracheostomy  Antibiotic History:  Unasyn for little less than 3 days perioperatively    EXAM & DIAGNOSTICS REVIEWED:   Vitals:     Temp:  [97.4 °F (36.3 °C)-98.3 °F (36.8 °C)]   Temp: 97.4 °F (36.3 °C) (11/23/22 0815)  Pulse: 80  (11/23/22 0835)  Resp: 18 (11/23/22 0835)  BP: (!) 165/73 (11/23/22 0500)  SpO2: 100 % (11/23/22 0835)    Intake/Output Summary (Last 24 hours) at 11/23/2022 0912  Last data filed at 11/23/2022 0800  Gross per 24 hour   Intake 1639.17 ml   Output 1100 ml   Net 539.17 ml       General:  In NAD. Alert and attentive, cooperative, comfortable  Eyes:  Anicteric, PERRL, EOMI  ENT:  Status post total glossectomy and pharyngectomy.  Portions of the soft palate and uvula are visible.  There is copious saliva and yellow exudate patchy throughout the oral cavity.  No obvious candidiasis.  He does have an intact gag reflex.  Neck:  supple, midline tracheostomy.  To the right side of the tracheostomy, under the tracheostomy fabric perales, there is superficial necrosis and there is a tunnel inferiorly.  I did not probe it.  There is no cellulitis of the neck.  On the left side there is more extensive soft tissue necrosis and eschar.  There is no cellulitis of the neck nor crepitance nor evidence of a necrotizing infection.  He is wearing TPs on the tracheostomy.  The lower portions of his mandibular areas are mildly edematous.  His wife states that the left side is less edematous than yesterday and markedly less edematous than post surgery.  The parotid glands are not tender nor swollen.  Lungs: Clear, no consolidation, rales, wheezes, rub.  Has loose secretions in the trachea.  Heart:  RRR, no gallop/murmur/rub noted  Abd:  Soft, NT, ND, normal BS, no masses or organomegaly appreciated.  There is a percutaneous gastrostomy without redness or tenderness  :  Voids   Musc:  Joints without effusion, swelling, erythema, synovitis, muscle wasting.   Skin:  No rashes.    Neuro:             Alert, attentive, speech fluent, face symmetric, moves all extremities, no focal weakness. Ambulatory  Psych: Calm, cooperative  Lymphatic:     No cervical, supraclavicular, axillary, or inguinal nodes  Extrem: No edema, erythema, phlebitis,  cellulitis, warm and well perfused  VAD:  Left upper chest Port-A-Cath which is accessed     Isolation:  None  Wound: See description above           General Labs reviewed:  Recent Labs   Lab 11/22/22 0225 11/23/22 0311   WBC 18.58* 9.45   HGB 9.3* 7.9*   HCT 28.0* 25.2*    307       Recent Labs   Lab 11/22/22 0225 11/23/22 0311   * 132*   K 3.7 3.0*   CL 95 102   CO2 23 21*   BUN 33* 24*   CREATININE 0.8 0.7   CALCIUM 7.1* 6.8*   PROT 6.9 5.8*   BILITOT 4.8* 5.0*   ALKPHOS 741* 767*   * 152*   AST 97* 82*     Recent Labs   Lab 11/22/22 1857 11/23/22 0311   CRP 22.30* 21.01*         Micro:  Microbiology Results (last 7 days)       Procedure Component Value Units Date/Time    Rapid Organism ID by PCR (from Blood culture) [[id number]]  (Abnormal) Collected: 11/22/22 0557    Order Status: Completed Updated: 11/23/22 0908     Enterococcus faecials Not Detected     Enterococcus faecium Not Detected     Listeria Monocytogenes Not Detected     Staphylococcus spp. Not Detected     Staphylococcus aureus Not Detected     Staphylococcus epidermidis Not Detected     Staphylococcus lugdunensis Not Detected     Streptococcus species Not Detected     Streptococcus agalactiae Not Detected     Streptococcus pneumoniae Not Detected     Streptococcus pyogenes Not Detected     Acinetobacter calcoaceticus/baumannii complex Not Detected     Bacteroides fragilis Not Detected     Enterobacerales Not Detected     Enterobacter cloacae complex Not Detected     Escherichia Not Detected     Klebsiella aerogenes Not Detected     Klebsiella oxytoca Not Detected     Klebsiella pneumoniae group Not Detected     Proteus Not Detected     Salmonella sp Not Detected     Serratia marcescens Not Detected     Haemophilus influenzae Not Detected     Neisseria meningtidis Not Detected     Pseudomonas aeruginosa Detected     Stenotrophomonas maltophilia Not Detected     Candida albicans Not Detected     Candida auris Not Detected      Juany glabrata Not Detected     Candida krusei Not Detected     Candida Parapsilosis Not Detected     Candida Tropicalis Not Detected     Cryptococcus neoformans/gattii Not Detected     CTX-M Gene Not Detected     IMP Gene Not Detected     KPC  Not Detected     mcr-1  Test not applicable     mec A/C Test not applicable     mec A/C and MREJ (MRSA) Test not applicable     NDM Not Detected     OXA-48-like Test not applicable     van A/B Test not applicable     VIM Not Detected    Blood Culture #2 **CANNOT BE ORDERED STAT** [[id number]] Collected: 11/22/22 0557    Order Status: Completed Specimen: Blood from Peripheral, Antecubital, Left Updated: 11/23/22 0832     Blood Culture, Routine No Growth to date      No Growth to date    Blood Culture #1 **CANNOT BE ORDERED STAT** [[id number]] Collected: 11/22/22 0557    Order Status: Completed Specimen: Blood from Peripheral, Hand, Right Updated: 11/23/22 0721     Blood Culture, Routine Gram stain aer bottle:  Gram negative rods      Results called to and read back by: Tamia Alfaro RN-3ICU;      11/23/2022  07:20 CJD    Culture, Respiratory with Gram Stain [[id number]] Collected: 11/22/22 2153    Order Status: Completed Specimen: Respiratory from Tracheal Aspirate Updated: 11/23/22 0029     Gram Stain (Respiratory) <10 epithelial cells per low power field.     Gram Stain (Respiratory) Few WBC's     Gram Stain (Respiratory) Rare Gram positive cocci    MRSA Screen by PCR [[id number]] Collected: 11/22/22 2225    Order Status: Completed Specimen: Nasopharyngeal Swab from Nasal Updated: 11/23/22 0017     MRSA SCREEN BY PCR Negative    Aerobic culture [[id number]] Collected: 11/22/22 2223    Order Status: Sent Specimen: Endotracheal from Neck Updated: 11/22/22 2228            Imaging Reviewed:   CXR   CT neck    Cardiology:    IMPRESSION & PLAN   1.  Postoperative infection of the deep tissues of the neck, without external cellulitis but with bacteremia due to Pseudomonas.    2.   Extensive head and neck cancer, squamous cell carcinoma status post laryngectomy, pharyngectomy, salivary diversion tube instillation      Recommendations:  Continue Zosyn  Will repeat blood cultures  It would be helpful to have the ENT doctors wound care recommendations for his neck and mouth as there is significant slough necrosis on the surface of his neck wounds    Out of bed as much as he will tolerate  Awaiting transfer to [facility]    Medical Decision Making during this encounter was  [_] Low Complexity  [_] Moderate Complexity  [ xxx ] High Complexity

## 2022-11-23 NOTE — ASSESSMENT & PLAN NOTE
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  As discussed at bedside patient isn't very high risk for same.  Previous surgery and radiation and tubed pharyngeal reconstruction as well as significant ongoing hypoalbuminemia/malnutrition.    Plan for transferred to [facility] main campus when possible.  Supportive care here.      Esophageal tube is in good position for diversion.  Wound care and supportive measures occluding treatment of infection and maximizing nutrition as recommended.

## 2022-11-23 NOTE — HPI
72 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 Patient presented to outside ED yesterday as patient was feeling overall malaise and according to patient had persistent cough. Was found to have elevated WBC and blood cultures positive for pseudomonas. Was admitted as he was unable to be transferred. 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 Had CT scan which showed possible phlegmon in submandibular region although on personal read appears more consistent with postoperative changes. Patient denies any worsening neck swelling or pain. Denies any regurgitation or difficulty tolerating tube feeds.

## 2022-11-23 NOTE — ASSESSMENT & PLAN NOTE
Status post laryngectomy  Questionable abscess seen on CT  Trend CRP  Blood cultures, sputum cultures, wound cultures  Respiratory on board  Pulmonology consulted  Continue IV antibiotics, consult ID  Breathing treatment

## 2022-11-23 NOTE — HOSPITAL COURSE
Admitted overnight and started on cefepime and vancomycin in the ER and then changed to Zosyn for consultation with Infectious Disease.  Overnight his white blood cell count decreased from [phone] slight decrease in persistent elevation of CRP.  Feeling quite a bit better overnight.  Wife reports edema of the anterior flap seems decreased but there still quite a bit of superficial purulence.  Patient reports feeling comfortable just feeling a throbbing sensation in the reconstruction. Blood pathogen PCR with pseudomonas.

## 2022-11-23 NOTE — PROGRESS NOTES
Pulmonary/Critical Care Consult      PATIENT NAME: Cyrus Batres Jr.  MRN: [id number]  TODAY'S DATE: 2022  4:34 PM  ADMIT DATE: 2022  AGE: 72 y.o. : 1950    CONSULT REQUESTED BY: Dean Jose MD    REASON FOR CONSULT:   Laryngeal abscess    HPI:  The patient is a 72 year old gentlemen who underwent pharyngectomy and glossectomy with placement of a salivary bypass tube.  He has a cuffed tracheostomy tube in place.  The CT is impressive to my eye, but Dr. James and Karan do no seem worried.  The patient is satting 98% on room air and is in no distress.  Several blood clots have been suctioned from the airway.    CXR is clear.     the patient has Pseudomonas bacteremia.  He was seen by Dr. Russo who does not perceive an abscess but a salivary fistula.  Local wound care is advised as well as continued antibiotics.  He believes the patient would be best served at [facility] with his operating physician.  Patient is receiving Zosyn for his Pseudomonas.    REVIEW OF SYSTEMS  GENERAL: Feeling better  EYES: Vision is good.  ENT: His throat is sore.  HEART: No chest pain or palpitations.  LUNGS: He is coughing up secretions.  GI: His PEG tube is fine.  : No dysuria, hesitancy, or nocturia.  SKIN: No lesions or rashes.  MUSCULOSKELETAL: No joint pain or myalgias.  NEURO: No headaches or neuropathy.  LYMPH: No edema or adenopathy.  PSYCH: No anxiety or depression.  ENDO: No weight change.    ALLERGIES  Review of patient's allergies indicates:   Allergen Reactions    Lovastatin Itching       INPATIENT SCHEDULED MEDICATIONS   albuterol-ipratropium  3 mL Nebulization Q8H    chlorhexidine  15 mL Mouth/Throat BID    levothyroxine  100 mcg Oral Before breakfast    mupirocin   Nasal BID    pantoprazole  40 mg Intravenous Daily    piperacillin-tazobactam (ZOSYN) IVPB  3.375 g Intravenous Q8H    sodium chloride 0.9%  1,000 mL Intravenous Once      lactated ringers 100 mL/hr at 22 0600        MEDICAL AND SURGICAL HISTORY  Past Medical History:   Diagnosis Date    Hypothyroidism 11/22/2022    PEG (percutaneous endoscopic gastrostomy) adjustment/replacement/removal 11/22/2022    Type 2 diabetes mellitus, without long-term current use of insulin 11/22/2022     History reviewed. No pertinent surgical history.  See other chart.    ALCOHOL, TOBACCO AND DRUG USE  Social History     Tobacco Use   Smoking Status Not on file   Smokeless Tobacco Not on file     Social History     Substance and Sexual Activity   Alcohol Use None     Social History     Substance and Sexual Activity   Drug Use Not on file       FAMILY HISTORY  History reviewed. No pertinent family history.    VITAL SIGNS (MOST RECENT)  Temp: 97.4 °F (36.3 °C) (11/23/22 0815)  Pulse: 80 (11/23/22 0835)  Resp: 18 (11/23/22 0835)  BP: (!) 165/73 (11/23/22 0500)  SpO2: 100 % (11/23/22 0835)    INTAKE AND OUTPUT (LAST 24 HOURS):  Intake/Output Summary (Last 24 hours) at 11/23/2022 1311  Last data filed at 11/23/2022 0800  Gross per 24 hour   Intake 1389.17 ml   Output 1100 ml   Net 289.17 ml       WEIGHT  Wt Readings from Last 1 Encounters:   11/23/22 62.2 kg (137 lb 2 oz)       PHYSICAL EXAM  GENERAL: Older patient in no distress.  HEENT: Pupils equal and reactive. Extraocular movements intact. Nose intact. Pharynx moist.  NECK: Supple. Tracheostomy tube in place.  Radiation changes and flap to right and left neck.  Some drainage present.  HEART: Regular rate and rhythm. No murmur or gallop auscultated.  LUNGS: Clear to auscultation and percussion. Lung excursion symmetrical. No change in fremitus. No adventitial noises.  ABDOMEN: PEG tube in place. Bowel sounds present. Non-tender, no masses palpated.  : Normal anatomy.  EXTREMITIES: Normal muscle tone and joint movement, no cyanosis or clubbing. Healing graft site.  LYMPHATICS: No adenopathy palpated, no edema.  SKIN: Dry, intact, no lesions.   NEURO: Cranial nerves II-XII intact. Motor strength  5/5 bilaterally, upper and lower extremities.  PSYCH: Appropriate affect    ACUTE PHASE REACTANT (LAST 24 HOURS)  Recent Labs   Lab 11/23/22 0311   CRP 21.01*       CBC LAST (LAST 24 HOURS)  Recent Labs   Lab 11/23/22 0311   WBC 9.45   RBC 2.67*   HGB 7.9*   HCT 25.2*   MCV 94   MCH 29.6   MCHC 31.3*   RDW 13.6      MPV 9.5   GRAN 92.6*  8.8*   LYMPH 3.1*  0.3*   MONO 3.1*  0.3   BASO 0.02   NRBC 0       CHEMISTRY LAST (LAST 24 HOURS)  Recent Labs   Lab 11/23/22 0311   *   K 3.0*      CO2 21*   ANIONGAP 9   BUN 24*   CREATININE 0.7      CALCIUM 6.8*   MG 1.8   ALBUMIN 2.4*   PROT 5.8*   ALKPHOS 767*   *   AST 82*   BILITOT 5.0*         CARDIAC PROFILE (LAST 24 HOURS)  Recent Labs   Lab 11/22/22 0225   TROPONINIHS 17.6*       LAST 7 DAYS MICROBIOLOGY   Microbiology Results (last 7 days)       Procedure Component Value Units Date/Time    Aerobic culture [[id number]] Collected: 11/22/22 2223    Order Status: Completed Specimen: Endotracheal from Neck Updated: 11/23/22 1120     Aerobic Bacterial Culture No growth    Blood culture [[id number]] Collected: 11/23/22 0943    Order Status: Sent Specimen: Blood from Line, Port A Cath Updated: 11/23/22 0949    Culture, Respiratory with Gram Stain [[id number]] Collected: 11/22/22 2153    Order Status: Completed Specimen: Respiratory from Tracheal Aspirate Updated: 11/23/22 0929     Respiratory Culture Insufficient incubation, culture in progress     Gram Stain (Respiratory) <10 epithelial cells per low power field.     Gram Stain (Respiratory) Few WBC's     Gram Stain (Respiratory) Rare Gram positive cocci    Blood Culture #2 **CANNOT BE ORDERED STAT** [[id number]] Collected: 11/22/22 0557    Order Status: Completed Specimen: Blood from Peripheral, Antecubital, Left Updated: 11/23/22 0916     Blood Culture, Routine Gram stain aer bottle: Gram negative rods      Results called to and read back by:Tamia Alfaro RN-3ICU;  11/23/2022      09:15  CJD    Rapid Organism ID by PCR (from Blood culture) [[id number]]  (Abnormal) Collected: 11/22/22 0557    Order Status: Completed Updated: 11/23/22 0908     Enterococcus faecials Not Detected     Enterococcus faecium Not Detected     Listeria Monocytogenes Not Detected     Staphylococcus spp. Not Detected     Staphylococcus aureus Not Detected     Staphylococcus epidermidis Not Detected     Staphylococcus lugdunensis Not Detected     Streptococcus species Not Detected     Streptococcus agalactiae Not Detected     Streptococcus pneumoniae Not Detected     Streptococcus pyogenes Not Detected     Acinetobacter calcoaceticus/baumannii complex Not Detected     Bacteroides fragilis Not Detected     Enterobacerales Not Detected     Enterobacter cloacae complex Not Detected     Escherichia Not Detected     Klebsiella aerogenes Not Detected     Klebsiella oxytoca Not Detected     Klebsiella pneumoniae group Not Detected     Proteus Not Detected     Salmonella sp Not Detected     Serratia marcescens Not Detected     Haemophilus influenzae Not Detected     Neisseria meningtidis Not Detected     Pseudomonas aeruginosa Detected     Stenotrophomonas maltophilia Not Detected     Candida albicans Not Detected     Candida auris Not Detected     Candida glabrata Not Detected     Candida krusei Not Detected     Candida Parapsilosis Not Detected     Candida Tropicalis Not Detected     Cryptococcus neoformans/gattii Not Detected     CTX-M Gene Not Detected     IMP Gene Not Detected     KPC  Not Detected     mcr-1  Test not applicable     mec A/C Test not applicable     mec A/C and MREJ (MRSA) Test not applicable     NDM Not Detected     OXA-48-like Test not applicable     van A/B Test not applicable     VIM Not Detected    Blood Culture #1 **CANNOT BE ORDERED STAT** [[id number]] Collected: 11/22/22 0557    Order Status: Completed Specimen: Blood from Peripheral, Hand, Right Updated: 11/23/22 0721     Blood Culture, Routine Gram stain aer  bottle:  Gram negative rods      Results called to and read back by: Tamia Alfaro RN-3ICU;      11/23/2022  07:20 CJD    MRSA Screen by PCR [[id number]] Collected: 11/22/22 2225    Order Status: Completed Specimen: Nasopharyngeal Swab from Nasal Updated: 11/23/22 0017     MRSA SCREEN BY PCR Negative            MOST RECENT IMAGING  US Soft Tissue Head Neck Thyroid  Reason: r/o abscess; fluid collection seen on ct of neck    COMPARISON: CT 11/22/2022    FINDINGS:  Targeted ultrasound of the neck, the left of midline, as compared with 11/22/2022 CT showing no discrete fluid collection to suggest a drainable abscess. Soft tissues are significantly distorted in patient with recent surgery, and appear diffusely heterogeneous.    IMPRESSION:  No definite abscess identified throughout the left neck.    Discussed with Dr. Camp at time of dictation.    Electronically signed by:  Nael Hui MD  11/23/2022 12:24 PM CST Workstation: [id number]      CURRENT VISIT EKG  Results for orders placed or performed during the hospital encounter of 11/22/22   EKG 12-lead    Narrative    Test Reason : R06.02,    Vent. Rate : 108 BPM     Atrial Rate : 108 BPM     P-R Int : 132 ms          QRS Dur : 076 ms      QT Int : 336 ms       P-R-T Axes : 008 -06 026 degrees     QTc Int : 450 ms    Sinus tachycardia  Otherwise normal ECG  No previous ECGs available    Referred By: [email or web address]   SELF           Confirmed By:        Patient has humidity to his tracheostomy    IMPRESSION AND PLAN  S/p pharynectomy and glossectomy with flap and salivary bypass following recurrence of laryngeal cancer  Pseudomonas bacteremia  Leukocytosis much improved with antibiotics  Anemia, likely some acute, some chronic disease significantly worse with hydration  Hyponatremia, improving  Hypokalemia  Hypocalcemia  Hyperbilirubinemia, etiology unclear  Transaminitis, improved  Moderate hypoalbuminemia    Will keep in ICU overnight, does not appear critical but  will watch airway  Humidify airway  Zosyn for Pseudomonas bacteremia  MRSA swab  Dr. Russo has seen patient and recommends local care  Dr. James happy to take patient back when bed is available  Patient has a noncuffed tracheostomy tube  Enteral nutrition  LR at 100cc/hr  Trend labs  Holding enoxaparin with hematoma  SCD's  Protonix for GI prophylaxis        Chloé Camp MD  [facility]  Department of Pulmonology  Date of Service: 11/23/2022  4:34 PM

## 2022-11-23 NOTE — SUBJECTIVE & OBJECTIVE
Medications:  Continuous Infusions:   lactated ringers 100 mL/hr at 11/23/22 1727     Scheduled Meds:   enoxaparin  40 mg Subcutaneous Daily    famotidine  20 mg Per G Tube BID    piperacillin-tazobactam (ZOSYN) IVPB  4.5 g Intravenous Q8H    [START ON 11/24/2022] polyethylene glycol  17 g Per G Tube Daily     PRN Meds:acetaminophen, calcium gluconate IVPB, calcium gluconate IVPB, calcium gluconate IVPB, HYDROcodone-acetaminophen, HYDROmorphone, magnesium sulfate IVPB, magnesium sulfate IVPB, ondansetron, potassium chloride in water **AND** potassium chloride in water **AND** potassium chloride in water, sodium chloride 0.9%     Current Facility-Administered Medications on File Prior to Encounter   Medication    [COMPLETED] calcium gluconate 1 g in NS IVPB (premixed)    [DISCONTINUED] 0.9%  NaCl infusion    [DISCONTINUED] acetaminophen tablet 650 mg    [DISCONTINUED] albuterol-ipratropium 2.5 mg-0.5 mg/3 mL nebulizer solution 3 mL    [DISCONTINUED] calcium gluconate 1 g in NS IVPB (premixed)    [DISCONTINUED] calcium gluconate 1 g in NS IVPB (premixed)    [DISCONTINUED] calcium gluconate 1 g in NS IVPB (premixed)    [DISCONTINUED] chlorhexidine 0.12 % solution 15 mL    [DISCONTINUED] dextrose 10% bolus 125 mL    [DISCONTINUED] dextrose 10% bolus 125 mL    [DISCONTINUED] dextrose 10% bolus 250 mL    [DISCONTINUED] glucagon (human recombinant) injection 1 mg    [DISCONTINUED] hydrALAZINE injection 10 mg    [DISCONTINUED] insulin aspart U-100 pen 0-5 Units    [DISCONTINUED] lactated ringers infusion    [DISCONTINUED] lactated ringers infusion    [DISCONTINUED] levothyroxine tablet 100 mcg    [DISCONTINUED] magnesium oxide tablet 800 mg    [DISCONTINUED] magnesium oxide tablet 800 mg    [DISCONTINUED] morphine injection 2 mg    [DISCONTINUED] mupirocin 2 % ointment    [DISCONTINUED] ondansetron injection 4 mg    [DISCONTINUED] pantoprazole injection 40 mg    [DISCONTINUED] piperacillin-tazobactam 3.375 g in dextrose 5 %  50 mL IVPB (ready to mix system)    [DISCONTINUED] potassium bicarbonate disintegrating tablet 35 mEq    [DISCONTINUED] potassium bicarbonate disintegrating tablet 50 mEq    [DISCONTINUED] potassium bicarbonate disintegrating tablet 60 mEq    [DISCONTINUED] potassium, sodium phosphates 280-160-250 mg packet 2 packet    [DISCONTINUED] potassium, sodium phosphates 280-160-250 mg packet 2 packet    [DISCONTINUED] potassium, sodium phosphates 280-160-250 mg packet 2 packet    [DISCONTINUED] sodium chloride 0.9% bolus 1,000 mL    [DISCONTINUED] sodium chloride 0.9% flush 10 mL     No current outpatient medications on file prior to encounter.       Review of patient's allergies indicates:   Allergen Reactions    Lovastatin Itching       Past Medical History:   Diagnosis Date    Hypothyroidism 11/22/2022    PEG (percutaneous endoscopic gastrostomy) adjustment/replacement/removal 11/22/2022    Type 2 diabetes mellitus, without long-term current use of insulin 11/22/2022     No past surgical history on file.  Family History    None       Tobacco Use    Smoking status: Not on file    Smokeless tobacco: Not on file   Substance and Sexual Activity    Alcohol use: Not on file    Drug use: Not on file    Sexual activity: Not on file     Review of Systems   Constitutional:  Positive for fatigue. Negative for fever.   Gastrointestinal:  Positive for vomiting.   Objective:     Vital Signs (Most Recent):  Pulse: 91 (11/23/22 1700)  Resp: 14 (11/23/22 1700)  BP: 115/67 (11/23/22 1700)  SpO2: 100 % (11/23/22 1700) Vital Signs (24h Range):  Temp:  [97.4 °F (36.3 °C)-98.3 °F (36.8 °C)] 97.8 °F (36.6 °C)  Pulse:  [] 91  Resp:  [14-29] 14  SpO2:  [99 %-100 %] 100 %  BP: (114-168)/(59-85) 115/67     Weight: 62.2 kg (137 lb 2 oz)  Body mass index is 22.13 kg/m².    Physical Exam  Constitutional:       General: He is not in acute distress.  HENT:      Head:      Comments: Mild facial edema      Mouth/Throat: Evidence of tube feeds  actively regurgitating, suctioned.      Comments: Flap soft to palpation, sutures to FOM intact   Eyes:      Extraocular Movements: Extraocular movements intact.   Neck:      Comments: Trach in place to stoma   Skin graft to midline neck overlying flap, dry  Small area of granulation laterally, able to probe with q-tip but no expression of drainage   Flap is soft without fluctuance   Cardiovascular:      Rate and Rhythm: Normal rate and regular rhythm.   Pulmonary:      Effort: No respiratory distress.   Neurological:      Mental Status: He is alert.     Significant Labs:  CBC:   Recent Labs   Lab 11/23/22  1721   WBC 8.43   RBC 2.64*   HGB 8.0*   HCT 23.9*      MCV 91   MCH 30.3   MCHC 33.5     CMP:   Recent Labs   Lab 11/23/22  0311 11/23/22  1342    141*   CALCIUM 6.8* 7.0*   ALBUMIN 2.4*  --    PROT 5.8*  --    * 130*   K 3.0* 4.0   CO2 21* 22*    98   BUN 24* 19   CREATININE 0.7 0.6   ALKPHOS 767*  --    *  --    AST 82*  --    BILITOT 5.0*  --        Significant Diagnostics:  I have reviewed all pertinent imaging results/findings within the past 24 hours.   CT neck without obvious abscess on personal read, possibly some phlegmon vs postsurgical changes posterior to flap but unclear source dehiscence  CXR clear

## 2022-11-23 NOTE — ASSESSMENT & PLAN NOTE
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    Patient with elevated WBC and positive blood cultures. Actively vomiting tube feeds during evaluation. G-tube decompressed with large amount of tube feeds removed with resolution of vomiting. Unclear source of dehiscence of closure site but based on vomitus, pharyngeal reconstruction defect present.      ENT/HNS:  - Laryngectomy protocol   -Sign above bed + outside door stating patient is "neck breather" and "can not be intubated by mouth"   -Clean laryngectomy stoma as needed, or daily  - Keep tracheostomy in place     - OK to replace tracheostomy if dislodged, only in place for swelling/secretion assistance   -Humidified O2 via trach collar     Neuro:   - Pain: Multimodality PRN regimen initiated     Cardiac:  - HDS  - Page ENT before starting vasopressors  - H/O of paroxysmal a fib, will CTM      Pulmonary:   - PRN breathing treatments  - Humidified air per trach collar  - OK to remove tracheostomy for adequate suctioning      Renal:   - monitor Is and Os  - Cr stable     Infectious Disease:  - Leukocytosis and BC positive for pseudomonas   - Suspect pharyngeal defect as source   - ID consulted at OSH with recs for zosyn, will continue   - CTM     Hematology/Oncology:  - H/H stable  - Lovenox DVT prophylaxis      FEN/GI  - Hold TFs for emesis   - Postoperative hypoparathyroidism   - Hypocalcemia on initial admission     - Restart tums QID, calcitriol   - Replace electrolytes as needed to keep K>4, Mg>2  - Bowel reg  - Protonix for GI prophylaxis, GERD  - STRICT nausea control ondansetron, phenergan       MSK  - Out of bed as tolerated  - STSG donor site healed     Dispo:  -Continue ICU care, will discuss with staff regarding ultimate plan     "

## 2022-11-23 NOTE — SUBJECTIVE & OBJECTIVE
Medications:  Continuous Infusions:   lactated ringers 100 mL/hr at 11/23/22 0600     Scheduled Meds:   albuterol-ipratropium  3 mL Nebulization Q8H    chlorhexidine  15 mL Mouth/Throat BID    levothyroxine  100 mcg Oral Before breakfast    mupirocin   Nasal BID    pantoprazole  40 mg Intravenous Daily    piperacillin-tazobactam (ZOSYN) IVPB  3.375 g Intravenous Q8H    sodium chloride 0.9%  1,000 mL Intravenous Once     PRN Meds:acetaminophen, calcium gluconate IVPB, calcium gluconate IVPB, calcium gluconate IVPB, dextrose 10%, dextrose 10%, dextrose 10%, glucagon (human recombinant), hydrALAZINE, insulin aspart U-100, magnesium oxide, magnesium oxide, morphine, ondansetron, potassium bicarbonate, potassium bicarbonate, potassium bicarbonate, potassium, sodium phosphates, potassium, sodium phosphates, potassium, sodium phosphates, sodium chloride 0.9%     No current facility-administered medications on file prior to encounter.     No current outpatient medications on file prior to encounter.       Review of patient's allergies indicates:   Allergen Reactions    Lovastatin Itching       Past Medical History:   Diagnosis Date    Hypothyroidism 11/22/2022    PEG (percutaneous endoscopic gastrostomy) adjustment/replacement/removal 11/22/2022    Type 2 diabetes mellitus, without long-term current use of insulin 11/22/2022     History reviewed. No pertinent surgical history.  Family History    None       Tobacco Use    Smoking status: Not on file    Smokeless tobacco: Not on file   Substance and Sexual Activity    Alcohol use: Not on file    Drug use: Not on file    Sexual activity: Not on file     Review of Systems   Constitutional:  Positive for fatigue. Negative for chills and fever.   HENT:  Positive for drooling, sore throat, trouble swallowing and voice change (aphonic status post laryngectomy).    Eyes: Negative.    Respiratory:  Positive for cough and choking. Negative for shortness of breath and wheezing.     Cardiovascular: Negative.  Negative for chest pain and leg swelling.   Gastrointestinal:  Positive for nausea and vomiting. Negative for diarrhea.   Endocrine: Negative.    Genitourinary: Negative.    Musculoskeletal: Negative.    Skin: Negative.    Allergic/Immunologic: Negative.    Neurological: Negative.    Hematological: Negative.    Psychiatric/Behavioral: Negative.     Objective:     Vital Signs (Most Recent):  Temp: 97.4 °F (36.3 °C) (11/23/22 0815)  Pulse: 86 (11/23/22 1349)  Resp: 19 (11/23/22 1349)  BP: (!) 165/73 (11/23/22 0500)  SpO2: 100 % (11/23/22 1349)   Vital Signs (24h Range):  Temp:  [97.4 °F (36.3 °C)-98.3 °F (36.8 °C)] 97.4 °F (36.3 °C)  Pulse:  [76-92] 86  Resp:  [17-29] 19  SpO2:  [99 %-100 %] 100 %  BP: (102-165)/(59-73) 165/73     Weight: 62.2 kg (137 lb 2 oz)  Body mass index is 22.13 kg/m².    Date 11/23/22 0700 - 11/24/22 0659   Shift 2106-5752 8306-5420 1340-5968 24 Hour Total   INTAKE   Shift Total(mL/kg)       OUTPUT   Urine(mL/kg/hr) 350   350   Shift Total(mL/kg) 350(5.6)   350(5.6)   Weight (kg) 62.2 62.2 62.2 62.2       Nontoxic appearing resting comfortably in bed with his wife at his bedside.  Face without plethora.  Neck with anterior flap reconstruction superior to the stoma with skin graft mostly intact with some crusting on the left superior stomal area.  Dehiscence of the superficial and immediate deep closure of the right side of the flap to the right neck.  More superiorly both incisions are still intact.  Palpation reveals good if not somewhat slow cap refill of the cutaneously covered portion of the flap.  There was good bleeding and granular tissue of the area of the dehiscence on the right.  No fluctuance.  No expressible purulence.  Superficial purulence only cleaned with gauze.  There was some small area of purulence in the posterior tracheal wall which was suctioned clear palpation of the stomal area did not result in any expression of any drainage or  purulence.    Left leg wound intact without cellulitis.  Right and left skin graft areas intact healing dry.    Significant Labs:  WBC   Date Value Ref Range Status   11/23/2022 9.45 3.90 - 12.70 K/uL Final   11/22/2022 18.58 (H) 3.90 - 12.70 K/uL Final     Hemoglobin   Date Value Ref Range Status   11/23/2022 8.0 (L) 14.0 - 18.0 g/dL Final   11/23/2022 7.9 (L) 14.0 - 18.0 g/dL Final   11/22/2022 9.3 (L) 14.0 - 18.0 g/dL Final     Albumin   Date Value Ref Range Status   11/23/2022 2.4 (L) 3.5 - 5.2 g/dL Final   11/22/2022 3.1 (L) 3.5 - 5.2 g/dL Final              Significant Diagnostics:  CT: I have reviewed all pertinent results/findings within the past 24 hours and my personal findings are:  see HPI

## 2022-11-23 NOTE — PLAN OF CARE
"POC reviewed. VSS, afebrile. Trach midline, suction/cannula changes prn. Good cough. Communicates with nods/writing board. Swelling to jaw/neck continues. Scabbed radiation burns to neck. Flap site with some oozing. US completed; no drainable abscess noted. Dr. Russo at bedside to assess; suggest Aquaphor to site with nonadherent dressings - awaiting transfer to [facility] where surgeon/team is located for closer monitoring of site. PEG WDL - pt feels "hunger pains." TF orders placed. Nauseous after electrolyte replacement admin - zofran x1. Urinal within reach with AUOP. Stg 1/blanchable redness to sacrum noted; wound care orders placed. Repeat bmp and h/h sent. Awaiting transport. Wife at bedside throughout day. Comfort promoted, safety maintained.     UPDATE 1536: EMS at bedside to transport pt. VSS upon transfer.     Problem: Adult Inpatient Plan of Care  Goal: Plan of Care Review  Outcome: Ongoing, Progressing     "

## 2022-11-23 NOTE — SUBJECTIVE & OBJECTIVE
History reviewed. No pertinent past medical history.    History reviewed. No pertinent surgical history.    Review of patient's allergies indicates:   Allergen Reactions    Lovastatin Itching       No current facility-administered medications on file prior to encounter.     No current outpatient medications on file prior to encounter.     Family History    None       Tobacco Use    Smoking status: Not on file    Smokeless tobacco: Not on file   Substance and Sexual Activity    Alcohol use: Not on file    Drug use: Not on file    Sexual activity: Not on file     Review of Systems   Unable to perform ROS: Patient nonverbal   Objective:     Vital Signs (Most Recent):  Temp: 98.1 °F (36.7 °C) (11/22/22 0124)  Pulse: 89 (11/22/22 1530)  Resp: 17 (11/22/22 1530)  BP: 109/66 (11/22/22 1530)  SpO2: 99 % (11/22/22 1530) Vital Signs (24h Range):  Temp:  [98.1 °F (36.7 °C)] 98.1 °F (36.7 °C)  Pulse:  [] 89  Resp:  [17-25] 17  SpO2:  [98 %-100 %] 99 %  BP: (100-119)/(58-74) 109/66     Weight: 63.5 kg (140 lb)  Body mass index is 22.6 kg/m².    Physical Exam  Vitals and nursing note reviewed.   HENT:      Mouth/Throat:      Comments: Tracheal in-situ  Eyes:      Pupils: Pupils are equal, round, and reactive to light.   Cardiovascular:      Rate and Rhythm: Normal rate.      Pulses: Normal pulses.   Pulmonary:      Effort: Pulmonary effort is normal.   Abdominal:      Palpations: Abdomen is soft.   Musculoskeletal:      Right lower leg: No edema.      Left lower leg: No edema.   Neurological:      Mental Status: He is alert. Mental status is at baseline.   Psychiatric:         Mood and Affect: Mood normal.         CRANIAL NERVES     CN III, IV, VI   Pupils are equal, round, and reactive to light.     Significant Labs: All pertinent labs within the past 24 hours have been reviewed.  Bilirubin:   Recent Labs   Lab 11/22/22  0225   BILITOT 4.8*     Blood Culture:   Recent Labs   Lab 11/22/22  3901   LABBLOO No Growth to date   No Growth to date     CBC:   Recent Labs   Lab 11/22/22 0225   WBC 18.58*   HGB 9.3*   HCT 28.0*        CMP:   Recent Labs   Lab 11/22/22 0225   *   K 3.7   CL 95   CO2 23   *   BUN 33*   CREATININE 0.8   CALCIUM 7.1*   PROT 6.9   ALBUMIN 3.1*   BILITOT 4.8*   ALKPHOS 741*   AST 97*   *   ANIONGAP 10     Cardiac Markers: No results for input(s): CKMB, MYOGLOBIN, BNP, TROPISTAT in the last 48 hours.  Coagulation: No results for input(s): PT, INR, APTT in the last 48 hours.  Lactic Acid:   Recent Labs   Lab 11/22/22 0551   LACTATE 1.2     Magnesium: No results for input(s): MG in the last 48 hours.  Respiratory Culture: No results for input(s): GSRESP, RESPIRATORYC in the last 48 hours.  Troponin:   Recent Labs   Lab 11/22/22 0225   TROPONINIHS 17.6*     Urine Studies: No results for input(s): COLORU, APPEARANCEUA, PHUR, SPECGRAV, PROTEINUA, GLUCUA, KETONESU, BILIRUBINUA, OCCULTUA, NITRITE, UROBILINOGEN, LEUKOCYTESUR, RBCUA, WBCUA, BACTERIA, SQUAMEPITHEL, HYALINECASTS in the last 48 hours.    Invalid input(s): WRIGHTSUR    Significant Imaging: I have reviewed all pertinent imaging results/findings within the past 24 hours.  Imaging Results              CT Soft Tissue Neck With Contrast (Final result)  Result time 11/22/22 05:25:50      Final result by Segundo Lagos MD (11/22/22 05:25:50)                   Narrative:    EXAM DESCRIPTION:  CT SOFT TISSUE NECK WITH CONTRAST    CLINICAL HISTORY:  72 years  Male  Soft tissue swelling, infection suspected, neck xray done; Neck mass, nonpulsatile; Head/neck cancer, assess treatment response    TECHNIQUE:  Contrast enhanced CT neck soft tissue with coronal and sagittal reformats. All CT scans at this facility use dose modulation, iterative reconstruction, and/or weight based dosing when appropriate to reduce radiation dose to as low as reasonably achievable.    COMPARISON: None    FINDINGS:    Extensive postsurgical changes in the  neck.    Irregular fluid collection with gas in the submandibular space at the level just posterior to the mandibular symphysis measuring approximately 4.7 x 1.1 x 1.3 cm (TV x AP x SI). There are foci of gas    There is fluid in the anterior soft tissues of the neck on the right extending from the level of C5 C6-7 T1 just above the tracheostomy tube that measures approximately 4.9 x 2.3 x 3.9 cm (TV x AP x SI). There is curvilinear density located behind this collection on the right.    Diffuse inflammatory changes with soft tissue gas at the level of the tracheostomy tube insertion to the left    There is a tubing in the esophagus that contains small amount of fluid in its superior aspect.    Diffuse soft tissue swelling and inflammatory changes to the neck on both sides.    Atherosclerosis of the carotid vessels.  Multilevel degenerative changes.  Left greater than right opacification of the bilateral maxillary sinuses with air-fluid levels on the left. Mild ethmoid sinus mucosal thickening. Minimal air-fluid levels in the bilateral sphenoid sinuses.    IMPRESSION:  1. Irregular fluid collection in the submandibular space as described above suspicious for phlegmon / abscess.  2. Irregular fluid collection above the tracheostomy tube as described above may represent combination of hematoma, phlegmon and developing abscess. Indeterminate curvilinear density behind this collection on the right and not within the collection.    THIS REPORT CONTAINS FINDINGS THAT MAY BE CRITICAL TO PATIENT CARE: The findings were verbally discussed via telephone conference with Anne Jacobs MD on 11/22/2022 at 5:21 AM CST.    Electronically signed by:  Segundo Lagos MD  11/22/2022 5:25 AM CST Workstation: [id number]                                     X-Ray Chest AP Portable (Final result)  Result time 11/22/22 08:21:30      Final result by Shira Menendez MD (11/22/22 08:21:30)                   Narrative:    Portable  chest x-ray at 2:25 AM    Clinical history shortness of breath    There is a tracheostomy tube in place. There is a left subclavian vein Port-A-Cath.  The cardiomediastinal silhouette is normal in size. The lungs are clear. There are no acute osseous abnormalities.    IMPRESSION: No acute pulmonary process    Electronically signed by:  Shira Menendez MD  11/22/2022 8:21 AM [facility] Workstation: [id number]

## 2022-11-23 NOTE — ASSESSMENT & PLAN NOTE
On Zosyn dramatic improvement in white blood cell count prolonged antibiotic therapy will be necessary.

## 2022-11-23 NOTE — PLAN OF CARE
11/23/22 0952   AGUIRRE Message   Medicare Outpatient and Observation Notification regarding financial responsibility Given to patient/caregiver;Explained to patient/caregiver;Signed/date by patient/caregiver   Date AGUIRRE was signed 11/23/22   Time AGUIRRE was signed 0928

## 2022-11-23 NOTE — NURSING
Nurses Note -- 4 Eyes      11/23/2022   5:43 PM      Skin assessed during: Admit      [] No Pressure Injuries Present    []Prevention Measures Documented      [x] Yes- Altered Skin Integrity Present or Discovered   [x] LDA Added if Not in Epic (Describe Wound)   [x] New Altered Skin Integrity was Present on Admit and Documented in LDA   [x] Wound Image Taken  -Immerse bed ordered as well.     Wound Care Consulted? Yes    Attending Nurse:  Diana Mccormick RN     Second RN/Staff Member:  Andrew Vergara RN

## 2022-11-23 NOTE — HPI
72-year-old nonsmoker male with a history of radiation failure and subsequent laryngectomy with right anterior lateral thigh flap reconstruction with recurrence in the tongue base who is now status post glossectomy and tubed pharyngeal flap reconstruction from left anterolateral thigh flap 11/09/2022 with Dr. Jesse James (extirpation) and Dr. Alise Hart (reconstruction) discharged 11/20/2022.  Patient was doing well Sunday of discharge started feeling a bit worse on Monday.  Wife noticed he was a bit tachypneic and just not feeling himself with worsening coughing but no gross fever.  Seen in the ER CT scanning completed with findings of diffuse surgical changes with air in a few areas of the georgie hypopharynx reconstruction without any distinct enhancing abscess collection.  Significant elevation of white blood cell count at 18,000.  Chest x-ray reported as clear.  Admitted to ICU for IV antibiotics and close observation; unable to be transferred to [facility] due to diversion.

## 2022-11-23 NOTE — NURSING
EMS at bedside to transport pt to [facility]. VSS. Phone/writing tablet with pt. Wife notified, she will come  pt bags soon.

## 2022-11-24 LAB
ACINETOBACTER CALCOACETICUS/BAUMANNII COMPLEX: NOT DETECTED
ALBUMIN SERPL BCP-MCNC: 1.8 G/DL (ref 3.5–5.2)
ALP SERPL-CCNC: 1114 U/L (ref 55–135)
ALT SERPL W/O P-5'-P-CCNC: 145 U/L (ref 10–44)
ANION GAP SERPL CALC-SCNC: 9 MMOL/L (ref 8–16)
AST SERPL-CCNC: 112 U/L (ref 10–40)
BACTEROIDES FRAGILIS: NOT DETECTED
BASOPHILS # BLD AUTO: 0.02 K/UL (ref 0–0.2)
BASOPHILS NFR BLD: 0.3 % (ref 0–1.9)
BILIRUB SERPL-MCNC: 4.7 MG/DL (ref 0.1–1)
BUN SERPL-MCNC: 17 MG/DL (ref 8–23)
CA-I BLDV-SCNC: 0.95 MMOL/L (ref 1.06–1.42)
CALCIUM SERPL-MCNC: 7 MG/DL (ref 8.7–10.5)
CANDIDA ALBICANS: NOT DETECTED
CANDIDA AURIS: NOT DETECTED
CANDIDA GLABRATA: NOT DETECTED
CANDIDA KRUSEI: NOT DETECTED
CANDIDA PARAPSILOSIS: NOT DETECTED
CANDIDA TROPICALIS: NOT DETECTED
CHLORIDE SERPL-SCNC: 102 MMOL/L (ref 95–110)
CO2 SERPL-SCNC: 20 MMOL/L (ref 23–29)
CREAT SERPL-MCNC: 0.7 MG/DL (ref 0.5–1.4)
CRYPTOCOCCUS NEOFORMANS/GATTII: NOT DETECTED
CTX-M GENE: NOT DETECTED
DIFFERENTIAL METHOD: ABNORMAL
ENTEROBACTER CLOACAE COMPLEX: NOT DETECTED
ENTEROBACTERALES: NOT DETECTED
ENTEROCOCCUS FAECALIS: NOT DETECTED
ENTEROCOCCUS FAECIUM: NOT DETECTED
EOSINOPHIL # BLD AUTO: 0.1 K/UL (ref 0–0.5)
EOSINOPHIL NFR BLD: 1.7 % (ref 0–8)
ERYTHROCYTE [DISTWIDTH] IN BLOOD BY AUTOMATED COUNT: 13.4 % (ref 11.5–14.5)
ESCHERICHIA COLI: NOT DETECTED
EST. GFR  (NO RACE VARIABLE): >60 ML/MIN/1.73 M^2
GLUCOSE SERPL-MCNC: 126 MG/DL (ref 70–110)
HAEMOPHILUS INFLUENZAE: NOT DETECTED
HCT VFR BLD AUTO: 22 % (ref 40–54)
HGB BLD-MCNC: 7.4 G/DL (ref 14–18)
IMM GRANULOCYTES # BLD AUTO: 0.06 K/UL (ref 0–0.04)
IMM GRANULOCYTES NFR BLD AUTO: 0.8 % (ref 0–0.5)
IMP GENE: NOT DETECTED
KLEBSIELLA AEROGENES: NOT DETECTED
KLEBSIELLA OXYTOCA: NOT DETECTED
KLEBSIELLA PNEUMONIAE GROUP: NOT DETECTED
KPC: NOT DETECTED
LISTERIA MONOCYTOGENES: NOT DETECTED
LYMPHOCYTES # BLD AUTO: 0.3 K/UL (ref 1–4.8)
LYMPHOCYTES NFR BLD: 3.6 % (ref 18–48)
MAGNESIUM SERPL-MCNC: 2.5 MG/DL (ref 1.6–2.6)
MCH RBC QN AUTO: 30.2 PG (ref 27–31)
MCHC RBC AUTO-ENTMCNC: 33.6 G/DL (ref 32–36)
MCR-1: ABNORMAL
MCV RBC AUTO: 90 FL (ref 82–98)
MEC A/C AND MREJ (MRSA): ABNORMAL
MEC A/C: DETECTED
MONOCYTES # BLD AUTO: 0.4 K/UL (ref 0.3–1)
MONOCYTES NFR BLD: 5.6 % (ref 4–15)
NDM: NOT DETECTED
NEISSERIA MENINGITIDIS: NOT DETECTED
NEUTROPHILS # BLD AUTO: 6.7 K/UL (ref 1.8–7.7)
NEUTROPHILS NFR BLD: 88 % (ref 38–73)
NRBC BLD-RTO: 0 /100 WBC
OXA-48-LIKE: ABNORMAL
PHOSPHATE SERPL-MCNC: 2.4 MG/DL (ref 2.7–4.5)
PLATELET # BLD AUTO: 302 K/UL (ref 150–450)
PMV BLD AUTO: 9.7 FL (ref 9.2–12.9)
POCT GLUCOSE: 111 MG/DL (ref 70–110)
POCT GLUCOSE: 114 MG/DL (ref 70–110)
POCT GLUCOSE: 122 MG/DL (ref 70–110)
POTASSIUM SERPL-SCNC: 4.6 MMOL/L (ref 3.5–5.1)
PROT SERPL-MCNC: 5.1 G/DL (ref 6–8.4)
PROTEUS SPECIES: NOT DETECTED
PSEUDOMONAS AERUGINOSA: DETECTED
RBC # BLD AUTO: 2.45 M/UL (ref 4.6–6.2)
SALMONELLA SP: NOT DETECTED
SERRATIA MARCESCENS: NOT DETECTED
SODIUM SERPL-SCNC: 131 MMOL/L (ref 136–145)
STAPHYLOCOCCUS AUREUS: NOT DETECTED
STAPHYLOCOCCUS EPIDERMIDIS: DETECTED
STAPHYLOCOCCUS LUGDUNESIS: NOT DETECTED
STAPHYLOCOCCUS SPECIES: ABNORMAL
STENOTROPHOMONAS MALTOPHILIA: NOT DETECTED
STREPTOCOCCUS AGALACTIAE: NOT DETECTED
STREPTOCOCCUS PNEUMONIAE: NOT DETECTED
STREPTOCOCCUS PYOGENES: NOT DETECTED
STREPTOCOCCUS SPECIES: NOT DETECTED
VAN A/B: ABNORMAL
VIM: NOT DETECTED
WBC # BLD AUTO: 7.56 K/UL (ref 3.9–12.7)

## 2022-11-24 PROCEDURE — 25000003 PHARM REV CODE 250: Performed by: STUDENT IN AN ORGANIZED HEALTH CARE EDUCATION/TRAINING PROGRAM

## 2022-11-24 PROCEDURE — 25000003 PHARM REV CODE 250

## 2022-11-24 PROCEDURE — 80053 COMPREHEN METABOLIC PANEL: CPT

## 2022-11-24 PROCEDURE — 82330 ASSAY OF CALCIUM: CPT

## 2022-11-24 PROCEDURE — 84100 ASSAY OF PHOSPHORUS: CPT

## 2022-11-24 PROCEDURE — 99900035 HC TECH TIME PER 15 MIN (STAT)

## 2022-11-24 PROCEDURE — 83735 ASSAY OF MAGNESIUM: CPT

## 2022-11-24 PROCEDURE — 99233 PR SUBSEQUENT HOSPITAL CARE,LEVL III: ICD-10-PCS | Mod: ,,, | Performed by: STUDENT IN AN ORGANIZED HEALTH CARE EDUCATION/TRAINING PROGRAM

## 2022-11-24 PROCEDURE — 94761 N-INVAS EAR/PLS OXIMETRY MLT: CPT

## 2022-11-24 PROCEDURE — 27000221 HC OXYGEN, UP TO 24 HOURS

## 2022-11-24 PROCEDURE — 63600175 PHARM REV CODE 636 W HCPCS

## 2022-11-24 PROCEDURE — 20600001 HC STEP DOWN PRIVATE ROOM

## 2022-11-24 PROCEDURE — C9113 INJ PANTOPRAZOLE SODIUM, VIA: HCPCS | Performed by: STUDENT IN AN ORGANIZED HEALTH CARE EDUCATION/TRAINING PROGRAM

## 2022-11-24 PROCEDURE — 85025 COMPLETE CBC W/AUTO DIFF WBC: CPT

## 2022-11-24 PROCEDURE — 99900026 HC AIRWAY MAINTENANCE (STAT)

## 2022-11-24 PROCEDURE — 99233 SBSQ HOSP IP/OBS HIGH 50: CPT | Mod: ,,, | Performed by: STUDENT IN AN ORGANIZED HEALTH CARE EDUCATION/TRAINING PROGRAM

## 2022-11-24 PROCEDURE — 63600175 PHARM REV CODE 636 W HCPCS: Performed by: STUDENT IN AN ORGANIZED HEALTH CARE EDUCATION/TRAINING PROGRAM

## 2022-11-24 RX ORDER — MUPIROCIN 20 MG/G
OINTMENT TOPICAL 2 TIMES DAILY
Status: DISPENSED | OUTPATIENT
Start: 2022-11-24 | End: 2022-11-29

## 2022-11-24 RX ADMIN — PANTOPRAZOLE SODIUM 40 MG: 40 INJECTION, POWDER, FOR SOLUTION INTRAVENOUS at 08:11

## 2022-11-24 RX ADMIN — CALCITRIOL CAPSULES 0.25 MCG 0.25 MCG: 0.25 CAPSULE ORAL at 08:11

## 2022-11-24 RX ADMIN — PIPERACILLIN SODIUM AND TAZOBACTAM SODIUM 4.5 G: 4; .5 INJECTION, POWDER, LYOPHILIZED, FOR SOLUTION INTRAVENOUS at 02:11

## 2022-11-24 RX ADMIN — POLYETHYLENE GLYCOL 3350 17 G: 17 POWDER, FOR SOLUTION ORAL at 08:11

## 2022-11-24 RX ADMIN — ENOXAPARIN SODIUM 40 MG: 40 INJECTION SUBCUTANEOUS at 04:11

## 2022-11-24 RX ADMIN — CALCIUM CARBONATE (ANTACID) CHEW TAB 500 MG 1000 MG: 500 CHEW TAB at 08:11

## 2022-11-24 RX ADMIN — CALCIUM CARBONATE (ANTACID) CHEW TAB 500 MG 1000 MG: 500 CHEW TAB at 12:11

## 2022-11-24 RX ADMIN — PIPERACILLIN SODIUM AND TAZOBACTAM SODIUM 4.5 G: 4; .5 INJECTION, POWDER, LYOPHILIZED, FOR SOLUTION INTRAVENOUS at 11:11

## 2022-11-24 RX ADMIN — CALCIUM CARBONATE (ANTACID) CHEW TAB 500 MG 1000 MG: 500 CHEW TAB at 09:11

## 2022-11-24 RX ADMIN — CALCIUM GLUCONATE 1 G: 20 INJECTION, SOLUTION INTRAVENOUS at 06:11

## 2022-11-24 RX ADMIN — SODIUM CHLORIDE, POTASSIUM CHLORIDE, SODIUM LACTATE AND CALCIUM CHLORIDE: 600; 310; 30; 20 INJECTION, SOLUTION INTRAVENOUS at 04:11

## 2022-11-24 RX ADMIN — MUPIROCIN: 20 OINTMENT TOPICAL at 08:11

## 2022-11-24 RX ADMIN — CALCIUM CARBONATE (ANTACID) CHEW TAB 500 MG 1000 MG: 500 CHEW TAB at 04:11

## 2022-11-24 RX ADMIN — PIPERACILLIN SODIUM AND TAZOBACTAM SODIUM 4.5 G: 4; .5 INJECTION, POWDER, LYOPHILIZED, FOR SOLUTION INTRAVENOUS at 07:11

## 2022-11-24 RX ADMIN — HYDROCODONE BITARTRATE AND ACETAMINOPHEN 1 TABLET: 5; 325 TABLET ORAL at 04:11

## 2022-11-24 NOTE — SUBJECTIVE & OBJECTIVE
Past Medical History:   Diagnosis Date    Hypothyroidism 11/22/2022    PEG (percutaneous endoscopic gastrostomy) adjustment/replacement/removal 11/22/2022    Type 2 diabetes mellitus, without long-term current use of insulin 11/22/2022       No past surgical history on file.    Review of patient's allergies indicates:   Allergen Reactions    Lovastatin Itching       Family History    None       Tobacco Use    Smoking status: Not on file    Smokeless tobacco: Not on file   Substance and Sexual Activity    Alcohol use: Not on file    Drug use: Not on file    Sexual activity: Not on file      Review of Systems   Constitutional:  Positive for fatigue. Negative for fever.   Gastrointestinal:  Positive for vomiting.   Objective:     Vital Signs (Most Recent):  Pulse: 90 (11/23/22 1815)  Resp: (!) 26 (11/23/22 1815)  BP: 107/61 (11/23/22 1800)  SpO2: 97 % (11/23/22 1815)   Vital Signs (24h Range):  Temp:  [97.4 °F (36.3 °C)-98.3 °F (36.8 °C)] 97.8 °F (36.6 °C)  Pulse:  [] 90  Resp:  [14-29] 26  SpO2:  [97 %-100 %] 97 %  BP: (103-168)/(59-85) 107/61     Weight: 62.2 kg (137 lb 2 oz)  Body mass index is 22.13 kg/m².      Intake/Output Summary (Last 24 hours) at 11/23/2022 1838  Last data filed at 11/23/2022 1800  Gross per 24 hour   Intake 53.95 ml   Output --   Net 53.95 ml       Physical Exam  Vitals reviewed.   Neck:      Comments: Trach tube placed, small 1cm long left sided dehiscence between ALT flap and skin graft near surgical airway. Expresses serosanguinous fluid to exploration with Q-tip. Granulation tissue present at entry.   No puss expressed.   Cardiovascular:      Rate and Rhythm: Normal rate and regular rhythm.      Pulses: Normal pulses.   Pulmonary:      Effort: Pulmonary effort is normal.   Neurological:      Mental Status: He is alert.       Vents:  Oxygen Concentration (%): 28 (11/23/22 1700)    Lines/Drains/Airways       Central Venous Catheter Line  Duration             Port A Cath Single  Lumen left subclavian -- days              Drain  Duration                  Gastrostomy/Enterostomy LUQ -- days              Airway  Duration             Adult Surgical Airway Shiley Cuffed -- days              Peripheral Intravenous Line  Duration                  Peripheral IV - Single Lumen 11/22/22 0227 20 G Anterior;Proximal;Right Forearm 1 day                    Significant Labs:    CBC/Anemia Profile:  Recent Labs   Lab 11/22/22 0225 11/23/22 0311 11/23/22  1342 11/23/22  1721   WBC 18.58* 9.45  --  8.43   HGB 9.3* 7.9* 8.0* 8.0*   HCT 28.0* 25.2* 24.2* 23.9*    307  --  323   MCV 92 94  --  91   RDW 13.7 13.6  --  13.5        Chemistries:  Recent Labs   Lab 11/22/22 0225 11/23/22 0311 11/23/22 1342 11/23/22  1721   * 132* 130* 132*   K 3.7 3.0* 4.0 3.6   CL 95 102 98 100   CO2 23 21* 22* 22*   BUN 33* 24* 19 20   CREATININE 0.8 0.7 0.6 0.7   CALCIUM 7.1* 6.8* 7.0* 7.6*   ALBUMIN 3.1* 2.4*  --  2.0*   PROT 6.9 5.8*  --  5.4*   BILITOT 4.8* 5.0*  --  5.3*   ALKPHOS 741* 767*  --  1,111*   * 152*  --  154*   AST 97* 82*  --  108*   MG  --  1.8 1.8 1.7   PHOS  --   --   --  3.6       Coagulation:   Recent Labs   Lab 11/23/22  1721   INR 1.2   APTT 29.6     Lactic Acid:   Recent Labs   Lab 11/22/22  0551 11/23/22  1721   LACTATE 1.2 0.7     All pertinent labs within the past 24 hours have been reviewed.    Significant Imaging: I have reviewed all pertinent imaging results/findings within the past 24 hours.  CT: I have reviewed all pertinent results/findings within the past 24 hours and my personal findings are:  CT neck findings significant for either post-operative changes or submandibular abscess.

## 2022-11-24 NOTE — PROGRESS NOTES
[facility]  Critical Care - Surgery  Progress Note    Patient Name: Cyrus Batres Jr.  MRN: [id number]  Admission Date: 11/23/2022  Hospital Length of Stay: 1 days  Code Status: Full Code  Attending Provider: Matheus Levy MD  Primary Care Provider: Maico Patterson MD   Principal Problem: <principal problem not specified>    Subjective:     Hospital/ICU Course:  Cyrus Batres is a 72 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 He was discharged from  on 11/20. Pt presented to [place] ED for malaise and persistent cough yesterday, and he was found to have elevated white count and blood culture positive for pseudomonas. ENT in [place] had concern for possible pharyngocutaneous fistula. CT scan showed possible submandicular abscess collection. Transferred to  for a higher level of ENT care and support. Pt denies neck swelling, pain, reflux, or difficulty with tube feeds.     He presents to SICU with left subclavian port-a-cath, right peripheral IV, mahan, and trach tube placed. Vitals are stable, and he is comfortable in bed.       Interval History/Significant Events:   Overnight events: AVA pt not requiring ICU needs at this time     Vitals: VSS    Need to know: Hgb dropped from 8.0 at admission to 7.4 this morning.           Objective:     Vital Signs (Most Recent):  Temp: 98.1 °F (36.7 °C) (11/24/22 0254)  Pulse: 81 (11/24/22 0500)  Resp: 16 (11/24/22 0500)  BP: 112/64 (11/24/22 0500)  SpO2: 100 % (11/24/22 0500) Vital Signs (24h Range):  Temp:  [97.1 °F (36.2 °C)-98.1 °F (36.7 °C)] 98.1 °F (36.7 °C)  Pulse:  [] 81  Resp:  [14-26] 16  SpO2:  [96 %-100 %] 100 %  BP: ()/(55-85) 112/64     Weight: 62.2 kg (137 lb 2 oz)  Body mass index is 22.13 kg/m².      Intake/Output Summary (Last 24 hours) at 11/24/2022 0619  Last data filed at 11/24/2022  0500  Gross per 24 hour   Intake 1530.53 ml   Output 350 ml   Net 1180.53 ml       Physical Exam  Vitals reviewed.   Constitutional:       General: He is not in acute distress.     Appearance: He is not ill-appearing.   Neck:      Comments: Trach tube placed, small 1cm long left sided dehiscence between ALT flap and skin graft near surgical airway. Expresses serosanguinous fluid to exploration with Q-tip. Granulation tissue present at entry.   No puss expressed.   Cardiovascular:      Rate and Rhythm: Normal rate and regular rhythm.      Pulses: Normal pulses.   Pulmonary:      Effort: Pulmonary effort is normal. No respiratory distress.   Abdominal:      General: Abdomen is flat. There is no distension.      Tenderness: There is no abdominal tenderness.   Skin:     General: Skin is warm and dry.      Coloration: Skin is not jaundiced.   Neurological:      General: No focal deficit present.      Mental Status: He is alert and oriented to person, place, and time.      Cranial Nerves: No cranial nerve deficit.   Psychiatric:         Mood and Affect: Mood normal.         Behavior: Behavior normal.         Thought Content: Thought content normal.         Judgment: Judgment normal.       Vents:  Oxygen Concentration (%): 28 (11/24/22 0500)    Lines/Drains/Airways       Central Venous Catheter Line  Duration             Port A Cath Single Lumen left subclavian -- days              Drain  Duration                  Gastrostomy/Enterostomy LUQ -- days              Airway  Duration             Adult Surgical Airway Shiley Cuffed -- days              Peripheral Intravenous Line  Duration                  Peripheral IV - Single Lumen 11/22/22 0227 20 G Anterior;Proximal;Right Forearm 2 days                    Significant Labs:    CBC/Anemia Profile:  Recent Labs   Lab 11/23/22  0311 11/23/22  1342 11/23/22  1721 11/24/22  0301   WBC 9.45  --  8.43 7.56   HGB 7.9* 8.0* 8.0* 7.4*   HCT 25.2* 24.2* 23.9* 22.0*     --  323  302   MCV 94  --  91 90   RDW 13.6  --  13.5 13.4        Chemistries:  Recent Labs   Lab 11/23/22  0311 11/23/22  1342 11/23/22  1721 11/24/22  0301   * 130* 132* 131*   K 3.0* 4.0 3.6 4.6    98 100 102   CO2 21* 22* 22* 20*   BUN 24* 19 20 17   CREATININE 0.7 0.6 0.7 0.7   CALCIUM 6.8* 7.0* 7.6* 7.0*   ALBUMIN 2.4*  --  2.0* 1.8*   PROT 5.8*  --  5.4* 5.1*   BILITOT 5.0*  --  5.3* 4.7*   ALKPHOS 767*  --  1,111* 1,114*   *  --  154* 145*   AST 82*  --  108* 112*   MG 1.8 1.8 1.7 2.5   PHOS  --   --  3.6 2.4*       All pertinent labs within the past 24 hours have been reviewed.    Significant Imaging:  I have reviewed all pertinent imaging results/findings within the past 24 hours.    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 He was discharged from  on 11/20. Pt presented to [place] ED for malaise and persistent cough yesterday, and he was found to have elevated white count and blood culture positive for pseudomonas.      Neuro/Psych:   -- Sedation: NA   -- Pain: PRN tylenol, norco, dilaudid              Cards:   -- HDS  -- hx of HTN  -- PRN hydralazine       Pulm:   -- Goal O2 sat > 90%  -- Wean as able      Renal:  -- No mahan  -- BUN/Cr 17/0.7      FEN / GI:   -- Net +1180cc  -- Replace lytes as needed  -- 100 cc LR IVMF  -- Nutrition: NPO except for meds through G-tube      ID:   -- Tm: afebrile; WBC 7.56  -- Abx Zosyn  -- PCR on 11/22 positive for pseudomonas   -- Repeat bcx ordered for 11/24, not from port       Heme/Onc:   -- H/H stable 7.4/22.0  -- Daily CBC      Endo:   -- Gluc goal 140-180  -- NPO SSI      PPx:   Feeding: NPO  Analgesia/Sedation: NA / PRN tylenol, Norco, dilaudid based  on pain scale  Thromboembolic prevention: lovenox 40mg   HOB >30: Yes  Stress Ulcer ppx: famotadine  Glucose control: Critical care goal 140-180 g/dl, ISS    Lines/Drains/Airway: left subclavian port, peripheral IV right sided      Dispo/Code Status/Palliative:   -- Possible Step down today / Full Code           Critical Care Daily Checklist:    A: Awake: RASS Goal/Actual Goal: RASS Goal: 0-->alert and calm  Actual: Sosa Agitation Sedation Scale (RASS): Alert and calm   B: Spontaneous Breathing Trial Performed?     C: SAT & SBT Coordinated?  N/A                     D: Delirium: CAM-ICU Overall CAM-ICU: Negative   E: Early Mobility Performed? Yes   F: Feeding Goal:    Status:     Current Diet Order   No orders of the defined types were placed in this encounter.      AS: Analgesia/Sedation No sedation, pain well controlled    T: Thromboembolic Prophylaxis Lovenox   H: HOB > 300 Yes   U: Stress Ulcer Prophylaxis (if needed) PPi   G: Glucose Control POCT glucose checks   B: Bowel Function Stool Occurrence: 0   I: Indwelling Catheter (Lines & Juarez) Necessity Port a cath, G-tube, trach   D: De-escalation of Antimicrobials/Pharmacotherapies Continue zosyn    Plan for the day/ETD Possible step down    Code Status:  Family/Goals of Care: Full Code              Critical care was time spent personally by me on the following activities: development of treatment plan with patient or surrogate and bedside caregivers, discussions with consultants, evaluation of patient's response to treatment, examination of patient, ordering and performing treatments and interventions, ordering and review of laboratory studies, ordering and review of radiographic studies, pulse oximetry, re-evaluation of patient's condition.  This critical care time did not overlap with that of any other provider or involve time for any procedures.     Susan Oleary MD  Critical Care - Surgery  [facility]

## 2022-11-24 NOTE — SUBJECTIVE & OBJECTIVE
Interval History/Significant Events:   Overnight events: cooper ESCALANTE not requiring ICU needs at this time     Vitals: VSS    Need to know: Hgb dropped from 8.0 at admission to 7.4 this morning.           Objective:     Vital Signs (Most Recent):  Temp: 98.1 °F (36.7 °C) (11/24/22 0254)  Pulse: 81 (11/24/22 0500)  Resp: 16 (11/24/22 0500)  BP: 112/64 (11/24/22 0500)  SpO2: 100 % (11/24/22 0500) Vital Signs (24h Range):  Temp:  [97.1 °F (36.2 °C)-98.1 °F (36.7 °C)] 98.1 °F (36.7 °C)  Pulse:  [] 81  Resp:  [14-26] 16  SpO2:  [96 %-100 %] 100 %  BP: ()/(55-85) 112/64     Weight: 62.2 kg (137 lb 2 oz)  Body mass index is 22.13 kg/m².      Intake/Output Summary (Last 24 hours) at 11/24/2022 0619  Last data filed at 11/24/2022 0500  Gross per 24 hour   Intake 1530.53 ml   Output 350 ml   Net 1180.53 ml       Physical Exam  Vitals reviewed.   Constitutional:       General: He is not in acute distress.     Appearance: He is not ill-appearing.   Neck:      Comments: Trach tube placed, small 1cm long left sided dehiscence between ALT flap and skin graft near surgical airway. Expresses serosanguinous fluid to exploration with Q-tip. Granulation tissue present at entry.   No puss expressed.   Cardiovascular:      Rate and Rhythm: Normal rate and regular rhythm.      Pulses: Normal pulses.   Pulmonary:      Effort: Pulmonary effort is normal. No respiratory distress.   Abdominal:      General: Abdomen is flat. There is no distension.      Tenderness: There is no abdominal tenderness.   Skin:     General: Skin is warm and dry.      Coloration: Skin is not jaundiced.   Neurological:      General: No focal deficit present.      Mental Status: He is alert and oriented to person, place, and time.      Cranial Nerves: No cranial nerve deficit.   Psychiatric:         Mood and Affect: Mood normal.         Behavior: Behavior normal.         Thought Content: Thought content normal.         Judgment: Judgment normal.        Vents:  Oxygen Concentration (%): 28 (11/24/22 0500)    Lines/Drains/Airways       Central Venous Catheter Line  Duration             Port A Cath Single Lumen left subclavian -- days              Drain  Duration                  Gastrostomy/Enterostomy LUQ -- days              Airway  Duration             Adult Surgical Airway Shiley Cuffed -- days              Peripheral Intravenous Line  Duration                  Peripheral IV - Single Lumen 11/22/22 0227 20 G Anterior;Proximal;Right Forearm 2 days                    Significant Labs:    CBC/Anemia Profile:  Recent Labs   Lab 11/23/22  0311 11/23/22  1342 11/23/22  1721 11/24/22  0301   WBC 9.45  --  8.43 7.56   HGB 7.9* 8.0* 8.0* 7.4*   HCT 25.2* 24.2* 23.9* 22.0*     --  323 302   MCV 94  --  91 90   RDW 13.6  --  13.5 13.4        Chemistries:  Recent Labs   Lab 11/23/22 0311 11/23/22  1342 11/23/22  1721 11/24/22  0301   * 130* 132* 131*   K 3.0* 4.0 3.6 4.6    98 100 102   CO2 21* 22* 22* 20*   BUN 24* 19 20 17   CREATININE 0.7 0.6 0.7 0.7   CALCIUM 6.8* 7.0* 7.6* 7.0*   ALBUMIN 2.4*  --  2.0* 1.8*   PROT 5.8*  --  5.4* 5.1*   BILITOT 5.0*  --  5.3* 4.7*   ALKPHOS 767*  --  1,111* 1,114*   *  --  154* 145*   AST 82*  --  108* 112*   MG 1.8 1.8 1.7 2.5   PHOS  --   --  3.6 2.4*       All pertinent labs within the past 24 hours have been reviewed.    Significant Imaging:  I have reviewed all pertinent imaging results/findings within the past 24 hours.

## 2022-11-24 NOTE — H&P
[facility]  Critical Care - Surgery  History & Physical    Patient Name: Cyrus Batres Jr.  MRN: [id number]  Admission Date: 11/23/2022  Code Status: Full Code  Attending Physician: Matheus Levy MD   Primary Care Provider: Maico Patterson MD   Principal Problem: <principal problem not specified>    Subjective:     HPI:  Cyrus Batres is a 72 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 He was discharged from  on 11/20. Pt presented to [place] ED for malaise and persistent cough yesterday, and he was found to have elevated white count and blood culture positive for pseudomonas. ENT in [place] had concern for possible pharyngocutaneous fistula. CT scan showed possible submandicular abscess collection. Transferred to  for a higher level of ENT care and support. Pt denies neck swelling, pain, reflux, or difficulty with tube feeds.     He presents to SICU with left subclavian port-a-cath, right peripheral IV, mahan, and trach tube placed. Vitals are stable, and he is comfortable in bed.       Hospital/ICU Course:  No notes on file         Past Medical History:   Diagnosis Date    Hypothyroidism 11/22/2022    PEG (percutaneous endoscopic gastrostomy) adjustment/replacement/removal 11/22/2022    Type 2 diabetes mellitus, without long-term current use of insulin 11/22/2022       No past surgical history on file.    Review of patient's allergies indicates:   Allergen Reactions    Lovastatin Itching       Family History    None       Tobacco Use    Smoking status: Not on file    Smokeless tobacco: Not on file   Substance and Sexual Activity    Alcohol use: Not on file    Drug use: Not on file    Sexual activity: Not on file      Review of Systems   Constitutional:  Positive for fatigue. Negative for fever.   Gastrointestinal:  Positive for vomiting.   Objective:      Vital Signs (Most Recent):  Pulse: 90 (11/23/22 1815)  Resp: (!) 26 (11/23/22 1815)  BP: 107/61 (11/23/22 1800)  SpO2: 97 % (11/23/22 1815)   Vital Signs (24h Range):  Temp:  [97.4 °F (36.3 °C)-98.3 °F (36.8 °C)] 97.8 °F (36.6 °C)  Pulse:  [] 90  Resp:  [14-29] 26  SpO2:  [97 %-100 %] 97 %  BP: (103-168)/(59-85) 107/61     Weight: 62.2 kg (137 lb 2 oz)  Body mass index is 22.13 kg/m².      Intake/Output Summary (Last 24 hours) at 11/23/2022 1838  Last data filed at 11/23/2022 1800  Gross per 24 hour   Intake 53.95 ml   Output --   Net 53.95 ml       Physical Exam  Vitals reviewed.   Neck:      Comments: Trach tube placed, small 1cm long left sided dehiscence between ALT flap and skin graft near surgical airway. Expresses serosanguinous fluid to exploration with Q-tip. Granulation tissue present at entry.   No puss expressed.   Cardiovascular:      Rate and Rhythm: Normal rate and regular rhythm.      Pulses: Normal pulses.   Pulmonary:      Effort: Pulmonary effort is normal.   Neurological:      Mental Status: He is alert.       Vents:  Oxygen Concentration (%): 28 (11/23/22 1700)    Lines/Drains/Airways       Central Venous Catheter Line  Duration             Port A Cath Single Lumen left subclavian -- days              Drain  Duration                  Gastrostomy/Enterostomy LUQ -- days              Airway  Duration             Adult Surgical Airway Shiley Cuffed -- days              Peripheral Intravenous Line  Duration                  Peripheral IV - Single Lumen 11/22/22 0227 20 G Anterior;Proximal;Right Forearm 1 day                    Significant Labs:    CBC/Anemia Profile:  Recent Labs   Lab 11/22/22 0225 11/23/22  0311 11/23/22  1342 11/23/22  1721   WBC 18.58* 9.45  --  8.43   HGB 9.3* 7.9* 8.0* 8.0*   HCT 28.0* 25.2* 24.2* 23.9*    307  --  323   MCV 92 94  --  91   RDW 13.7 13.6  --  13.5        Chemistries:  Recent Labs   Lab 11/22/22  0225 11/23/22  0311 11/23/22  1342  11/23/22  1721   * 132* 130* 132*   K 3.7 3.0* 4.0 3.6   CL 95 102 98 100   CO2 23 21* 22* 22*   BUN 33* 24* 19 20   CREATININE 0.8 0.7 0.6 0.7   CALCIUM 7.1* 6.8* 7.0* 7.6*   ALBUMIN 3.1* 2.4*  --  2.0*   PROT 6.9 5.8*  --  5.4*   BILITOT 4.8* 5.0*  --  5.3*   ALKPHOS 741* 767*  --  1,111*   * 152*  --  154*   AST 97* 82*  --  108*   MG  --  1.8 1.8 1.7   PHOS  --   --   --  3.6       Coagulation:   Recent Labs   Lab 11/23/22  1721   INR 1.2   APTT 29.6     Lactic Acid:   Recent Labs   Lab 11/22/22  0551 11/23/22  1721   LACTATE 1.2 0.7     All pertinent labs within the past 24 hours have been reviewed.    Significant Imaging: I have reviewed all pertinent imaging results/findings within the past 24 hours.  CT: I have reviewed all pertinent results/findings within the past 24 hours and my personal findings are:  CT neck findings significant for either post-operative changes or submandibular abscess.     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 He was discharged from  on 11/20. Pt presented to [place] ED for malaise and persistent cough yesterday, and he was found to have elevated white count and blood culture positive for pseudomonas.      Neuro/Psych:   -- Sedation: NA   -- Pain: PRN tylenol, norco, dilaudid              Cards:   -- HDS  -- hx of HTN  -- PRN hydralazine       Pulm:   -- Goal O2 sat > 90%  -- Wean as able      Renal:  -- Keep mahan for strict I/O  -- BUN/Cr 20/0.7      FEN / GI:   -- Net +54cc  -- Replace lytes as needed  -- 100 cc LR IVMF  -- Nutrition: NPO except for meds through G-tube      ID:   -- Tm: afebrile; WBC 8.43  -- Abx Zosyn  -- PCR on 11/22 positive for pseudomonas        Heme/Onc:   -- H/H stable 8/23.9  -- Daily CBC      Endo:   -- Gluc goal 140-180  -- NPO SSI      PPx:   Feeding: NPO  Analgesia/Sedation: NA / PRN tylenol, Norco, dilaudid based on pain scale  Thromboembolic prevention: lovenox 40mg   HOB >30: Yes  Stress Ulcer ppx: famotadine  Glucose control: Critical care goal 140-180 g/dl, ISS    Lines/Drains/Airway: left subclavian port, peripheral IV right sided, mahan       Dispo/Code Status/Palliative:   -- SICU / Full Code         ISRA EAGLE MD  Critical Care - Surgery  [facility]

## 2022-11-24 NOTE — ASSESSMENT & PLAN NOTE
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    Patient with elevated WBC and positive blood cultures. Actively vomiting tube feeds during evaluation. G-tube decompressed with large amount of tube feeds removed with resolution of vomiting. Unclear source of dehiscence of closure site but based on vomitus, pharyngeal reconstruction defect present.      ENT/HNS:  - Laryngectomy protocol   -Sign above bed + outside door stating patient is "neck breather" and "can not be intubated by mouth"   -Clean laryngectomy stoma as needed, or daily  - Keep tracheostomy in place     - OK to replace tracheostomy if dislodged, only in place for swelling/secretion assistance   - Humidified O2 via trach collar  - ENT to perform bedside packing changes to right lateral neck      Neuro:   - Pain: Multimodality PRN regimen initiated     Cardiac:  - HDS  - Page ENT before starting vasopressors  - H/O of paroxysmal a fib, will CTM      Pulmonary:   - PRN breathing treatments  - Humidified air per trach collar  - OK to remove tracheostomy for adequate suctioning      Renal:   - monitor Is and Os  - Cr stable     Infectious Disease:  - Leukocytosis and BC positive for pseudomonas -- now improved   - Suspect pharyngeal defect as source   - ID consulted at OSH with recs for zosyn, will continue   - CTM     Hematology/Oncology:  - H/H stable  - Lovenox DVT prophylaxis      FEN/GI  - Hold TFs for emesis; cont to hold given dehiscence  - Postoperative hypoparathyroidism   - Hypocalcemia on initial admission     - Restart tums QID, calcitriol   - Replace electrolytes as needed to keep K>4, Mg>2  - Bowel reg  - Protonix for GI prophylaxis, GERD  - STRICT nausea control ondansetron, phenergan       MSK  - Out of bed as tolerated  - STSG donor site healed     Dispo:  -Ok to stepdown to floor (GISSU), will discuss with " staff regarding ultimate plan

## 2022-11-24 NOTE — NURSING
Pt arrives to room 1055 from SICU, alert and oriented x 4, unable to speak, communicates via signs and written tab, pt has a laryngectomy, moves self, able to transfer from wheelchair to bed with minimal assist, pt has an accessed port to left chest and a #20 G to RAC saline lock at this time and will start LR at 100 ml/hr as order. Pt has telemetry orders that is to be discontinued according to SICU nurse per SICU resident. Pt transferred with no telemetry. Pt on RA sating 99%, legs propped on pillows to keep heels off mattress, and mepilex placed to sacrum area, pt does complain of pain to sacrum, sacrum skin intact, with little redness, pt educated to reposition every 2 hours. Pt also has a PEG tube clamped, pt continues NPO and no tube feeds at this time. Pt re-oriented to room and to the use of the call light, suction set up and ready to use. Bed in low position. Will continue to monitor.

## 2022-11-24 NOTE — NURSING TRANSFER
Nursing Transfer Note      11/24/2022     Reason patient is being transferred: Step down orders    Transfer To: [facility] 1055[facility] [id number]    Transfer via wheelchair    Transfer with belongings    Transported by RN    Medicines sent: mupirocin ointment    Any special needs or follow-up needed: ENT follow up     Chart send with patient: Yes    Notified: spouse    Patient reassessed at: 1530; 11/24/22     Upon arrival to floor: patient oriented to room, call bell in reach, and bed in lowest position.    Pt transferred after report called to [facility] RN.  Clarified with MD that cardiac monitoring is not needed once transferred out of the ICU.  Pt transferred to bed from wheelchair upon arrival to unit.  All questions and concerns addressed.

## 2022-11-24 NOTE — ASSESSMENT & PLAN NOTE
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 He was discharged from  on 11/20. Pt presented to [place] ED for malaise and persistent cough yesterday, and he was found to have elevated white count and blood culture positive for pseudomonas.      Neuro/Psych:   -- Sedation: NA   -- Pain: PRN tylenol, norco, dilaudid              Cards:   -- HDS  -- hx of HTN  -- PRN hydralazine       Pulm:   -- Goal O2 sat > 90%  -- Wean as able      Renal:  -- Keep mahan for strict I/O  -- BUN/Cr 20/0.7      FEN / GI:   -- Net +54cc  -- Replace lytes as needed  -- 100 cc LR IVMF  -- Nutrition: NPO except for meds through G-tube      ID:   -- Tm: afebrile; WBC 8.43  -- Abx Zosyn  -- PCR on 11/22 positive for pseudomonas       Heme/Onc:   -- H/H stable 8/23.9  -- Daily CBC      Endo:   -- Gluc goal 140-180  -- NPO SSI      PPx:   Feeding: NPO  Analgesia/Sedation: NA / PRN tylenol, Norco, dilaudid based on pain scale  Thromboembolic prevention: lovenox 40mg   HOB >30: Yes  Stress Ulcer ppx: famotadine  Glucose control: Critical care goal 140-180 g/dl, ISS    Lines/Drains/Airway: left subclavian port, peripheral IV right sided, mahan       Dispo/Code Status/Palliative:   -- SICU / Full Code

## 2022-11-24 NOTE — HOSPITAL COURSE
Cyrus Batres is a 72 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 He was discharged from  on 11/20. Pt presented to [place] ED for malaise and persistent cough yesterday, and he was found to have elevated white count and blood culture positive for pseudomonas. ENT in [place] had concern for possible pharyngocutaneous fistula. CT scan showed possible submandicular abscess collection. Transferred to  for a higher level of ENT care and support. Pt denies neck swelling, pain, reflux, or difficulty with tube feeds.     He presents to SICU with left subclavian port-a-cath, right peripheral IV, mahan, and trach tube placed. Vitals are stable, and he is comfortable in bed.

## 2022-11-24 NOTE — SUBJECTIVE & OBJECTIVE
Interval History: Pt with improved from general malaise standpoint. Denies any complaints this AM, though with superficial dehiscence of lateral aspect of flap site.    Medications:  Continuous Infusions:   lactated ringers 100 mL/hr at 11/24/22 1200     Scheduled Meds:   calcitRIOL  0.25 mcg Per G Tube Daily    calcium carbonate  1,000 mg Per G Tube QID    enoxaparin  40 mg Subcutaneous Daily    mupirocin   Nasal BID    pantoprazole  40 mg Intravenous Daily    piperacillin-tazobactam (ZOSYN) IVPB  4.5 g Intravenous Q8H    polyethylene glycol  17 g Per G Tube Daily     PRN Meds:acetaminophen, calcium gluconate IVPB, calcium gluconate IVPB, calcium gluconate IVPB, dextrose 10%, dextrose 10%, glucagon (human recombinant), hydrALAZINE, HYDROcodone-acetaminophen, insulin aspart U-100, magnesium sulfate IVPB, magnesium sulfate IVPB, ondansetron, potassium chloride in water **AND** potassium chloride in water **AND** potassium chloride in water, promethazine (PHENERGAN) IVPB, senna-docusate 8.6-50 mg, sodium chloride 0.9%     Review of patient's allergies indicates:   Allergen Reactions    Lovastatin Itching     Objective:     Vital Signs (24h Range):  Temp:  [97.1 °F (36.2 °C)-98.3 °F (36.8 °C)] 98.3 °F (36.8 °C)  Pulse:  [68-96] 80  Resp:  [14-26] 19  SpO2:  [96 %-100 %] 100 %  BP: ()/(55-70) 114/64     Date 11/24/22 0700 - 11/25/22 0659   Shift 4290-7439 6312-3538 5505-3852 24 Hour Total   INTAKE   I.V.(mL/kg) 572.1(9.2)   572.1(9.2)   IV Piggyback 165.8   165.8   Shift Total(mL/kg) 737.9(11.9)   737.9(11.9)   OUTPUT   Shift Total(mL/kg)       Weight (kg) 62.2 62.2 62.2 62.2     Lines/Drains/Airways       Central Venous Catheter Line  Duration             Port A Cath Single Lumen left subclavian -- days              Drain  Duration                  Gastrostomy/Enterostomy LUQ -- days              Airway  Duration             Adult Surgical Airway Shiley Cuffed -- days              Peripheral Intravenous Line   Duration                  Peripheral IV - Single Lumen 11/22/22 0227 20 G Anterior;Proximal;Right Forearm 2 days                    Physical Exam  NAD  Awake and alert  Mild facial edema   Trach within stoma   Skin graft to midline neck overlying flap, dry  Small area of dehiscence laterally with pooled serous drainage, probed with cotton tip applicator though remains superficial, packed at bedside with WTD no drainage expressed  Intraoral flap is soft without fluctuance   Normal WOB, no stridor or stertor        Significant Labs:  CBC:   Recent Labs   Lab 11/24/22  0301   WBC 7.56   RBC 2.45*   HGB 7.4*   HCT 22.0*      MCV 90   MCH 30.2   MCHC 33.6     CMP:   Recent Labs   Lab 11/24/22  0301   *   CALCIUM 7.0*   ALBUMIN 1.8*   PROT 5.1*   *   K 4.6   CO2 20*      BUN 17   CREATININE 0.7   ALKPHOS 1,114*   *   *   BILITOT 4.7*       Significant Diagnostics:  I have reviewed all pertinent imaging results/findings within the past 24 hours.

## 2022-11-24 NOTE — NURSING
Pt arrived by ambulance to SICU [id number].  Pt connected to wall monitor upon arrival.  Care team notified of pt arrival.  ENT also notified of pt arrival to unit.  Assessment performed and wound on sacrum added to chart.  Awaiting orders from MD. BO.

## 2022-11-24 NOTE — PLAN OF CARE
SICU PLAN OF CARE NOTE    Dx: <principal problem not specified>    Shift Events: No acute events overnight.    Goals of Care: Abx therapy and airway management.    Neuro: AAO x4 and Follows Commands. Pt communication impaired. Uses written communication and nods appropriately.    Vital Signs: BP (!) 108/57   Pulse 73   Temp 98.1 °F (36.7 °C) (Axillary)   Resp 17   Wt 62.2 kg (137 lb 2 oz)   SpO2 100%   BMI 22.13 kg/m²     Respiratory: Cheli in place. 5L 28% trach collar. Suctioning and care done per respiratory.    Diet: NPO    Gtts: MIVF and Abx    Urine Output: Voids Spontaneously per urinal. Adequate output overnight. X1 unmeasured occurrence.     Drains: no drains in place.     Labs/Accuchecks: Accuchecks done q6hr. No coverage needed overnight.    Skin: Bilat neck incision w/ staples CDI. L thigh incision w/ staples CDI. R thigh graft site HENRY scabbed over. Sacral breakdown noted on assessment. Pt repositioned q2hr and independently repositioned.          Problem: Adult Inpatient Plan of Care  Goal: Plan of Care Review  Outcome: Ongoing, Progressing  Goal: Patient-Specific Goal (Individualized)  Outcome: Ongoing, Progressing  Goal: Absence of Hospital-Acquired Illness or Injury  Outcome: Ongoing, Progressing  Goal: Optimal Comfort and Wellbeing  Outcome: Ongoing, Progressing  Goal: Readiness for Transition of Care  Outcome: Ongoing, Progressing     Problem: Diabetes Comorbidity  Goal: Blood Glucose Level Within Targeted Range  Outcome: Ongoing, Progressing     Problem: Infection  Goal: Absence of Infection Signs and Symptoms  Outcome: Ongoing, Progressing     Problem: Impaired Wound Healing  Goal: Optimal Wound Healing  Outcome: Ongoing, Progressing     Problem: Fall Injury Risk  Goal: Absence of Fall and Fall-Related Injury  Outcome: Ongoing, Progressing     Problem: Skin Injury Risk Increased  Goal: Skin Health and Integrity  Outcome: Ongoing, Progressing

## 2022-11-24 NOTE — HPI
Cyrus Batres is a 72 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 He was discharged from  on 11/20. Pt presented to [place] ED for malaise and persistent cough yesterday, and he was found to have elevated white count and blood culture positive for pseudomonas. ENT in [place] had concern for possible pharyngocutaneous fistula. CT scan showed possible submandicular abscess collection. Transferred to  for a higher level of ENT care and support. Pt denies neck swelling, pain, reflux, or difficulty with tube feeds.     He presents to SICU with left subclavian port-a-cath, right peripheral IV, mahan, and trach tube placed. Vitals are stable, and he is comfortable in bed.

## 2022-11-24 NOTE — PROGRESS NOTES
[facility]  Otorhinolaryngology-Head & Neck Surgery  Progress Note    Subjective:     Post-Op Info:  * No surgery found *      Hospital Day: 2     Interval History: Pt with improved from general malaise standpoint. Denies any complaints this AM, though with superficial dehiscence of lateral aspect of flap site.    Medications:  Continuous Infusions:   lactated ringers 100 mL/hr at 11/24/22 1200     Scheduled Meds:   calcitRIOL  0.25 mcg Per G Tube Daily    calcium carbonate  1,000 mg Per G Tube QID    enoxaparin  40 mg Subcutaneous Daily    mupirocin   Nasal BID    pantoprazole  40 mg Intravenous Daily    piperacillin-tazobactam (ZOSYN) IVPB  4.5 g Intravenous Q8H    polyethylene glycol  17 g Per G Tube Daily     PRN Meds:acetaminophen, calcium gluconate IVPB, calcium gluconate IVPB, calcium gluconate IVPB, dextrose 10%, dextrose 10%, glucagon (human recombinant), hydrALAZINE, HYDROcodone-acetaminophen, insulin aspart U-100, magnesium sulfate IVPB, magnesium sulfate IVPB, ondansetron, potassium chloride in water **AND** potassium chloride in water **AND** potassium chloride in water, promethazine (PHENERGAN) IVPB, senna-docusate 8.6-50 mg, sodium chloride 0.9%     Review of patient's allergies indicates:   Allergen Reactions    Lovastatin Itching     Objective:     Vital Signs (24h Range):  Temp:  [97.1 °F (36.2 °C)-98.3 °F (36.8 °C)] 98.3 °F (36.8 °C)  Pulse:  [68-96] 80  Resp:  [14-26] 19  SpO2:  [96 %-100 %] 100 %  BP: ()/(55-70) 114/64     Date 11/24/22 0700 - 11/25/22 0659   Shift 9713-5272 0423-2767 6766-5862 24 Hour Total   INTAKE   I.V.(mL/kg) 572.1(9.2)   572.1(9.2)   IV Piggyback 165.8   165.8   Shift Total(mL/kg) 737.9(11.9)   737.9(11.9)   OUTPUT   Shift Total(mL/kg)       Weight (kg) 62.2 62.2 62.2 62.2     Lines/Drains/Airways       Central Venous Catheter Line  Duration             Port A Cath Single Lumen left subclavian -- days              Drain  Duration       "            Gastrostomy/Enterostomy LUQ -- days              Airway  Duration             Adult Surgical Airway Shiley Cuffed -- days              Peripheral Intravenous Line  Duration                  Peripheral IV - Single Lumen 11/22/22 0227 20 G Anterior;Proximal;Right Forearm 2 days                    Physical Exam  NAD  Awake and alert  Mild facial edema   Trach within stoma   Skin graft to midline neck overlying flap, dry  Small area of dehiscence laterally with pooled serous drainage, probed with cotton tip applicator though remains superficial, packed at bedside with WTD no drainage expressed  Intraoral flap is soft without fluctuance   Normal WOB, no stridor or stertor        Significant Labs:  CBC:   Recent Labs   Lab 11/24/22  0301   WBC 7.56   RBC 2.45*   HGB 7.4*   HCT 22.0*      MCV 90   MCH 30.2   MCHC 33.6     CMP:   Recent Labs   Lab 11/24/22  0301   *   CALCIUM 7.0*   ALBUMIN 1.8*   PROT 5.1*   *   K 4.6   CO2 20*      BUN 17   CREATININE 0.7   ALKPHOS 1,114*   *   *   BILITOT 4.7*       Significant Diagnostics:  I have reviewed all pertinent imaging results/findings within the past 24 hours.    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    Patient with elevated WBC and positive blood cultures. Actively vomiting tube feeds during evaluation. G-tube decompressed with large amount of tube feeds removed with resolution of vomiting. Unclear source of dehiscence of closure site but based on vomitus, pharyngeal reconstruction defect present.      ENT/HNS:  - Laryngectomy protocol   -Sign above bed + outside door stating patient is "neck breather" and "can not be intubated by mouth"   -Clean laryngectomy stoma as needed, or daily  - Keep tracheostomy in place     - OK to replace tracheostomy if " dislodged, only in place for swelling/secretion assistance   - Humidified O2 via trach collar  - ENT to perform bedside packing changes to right lateral neck      Neuro:   - Pain: Multimodality PRN regimen initiated     Cardiac:  - HDS  - Page ENT before starting vasopressors  - H/O of paroxysmal a fib, will CTM      Pulmonary:   - PRN breathing treatments  - Humidified air per trach collar  - OK to remove tracheostomy for adequate suctioning      Renal:   - monitor Is and Os  - Cr stable     Infectious Disease:  - Leukocytosis and BC positive for pseudomonas -- now improved   - Suspect pharyngeal defect as source   - ID consulted at OSH with recs for zosyn, will continue   - CTM     Hematology/Oncology:  - H/H stable  - Lovenox DVT prophylaxis      FEN/GI  - Hold TFs for emesis; cont to hold given dehiscence  - Postoperative hypoparathyroidism   - Hypocalcemia on initial admission     - Restart tums QID, calcitriol   - Replace electrolytes as needed to keep K>4, Mg>2  - Bowel reg  - Protonix for GI prophylaxis, GERD  - STRICT nausea control ondansetron, phenergan       MSK  - Out of bed as tolerated  - STSG donor site healed     Dispo:  -Ok to stepdown to floor (GISSU), will discuss with staff regarding ultimate plan           Ronaldo Hyatt MD  Otorhinolaryngology-Head & Neck Surgery  [facility]

## 2022-11-24 NOTE — PROGRESS NOTES
Notified MD Kaila of pt HR sustaining 52bpm. Instructed to titrate propofol down if continued bradying.

## 2022-11-24 NOTE — ASSESSMENT & PLAN NOTE
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 He was discharged from  on 11/20. Pt presented to [facility] ED for malaise and persistent cough yesterday, and he was found to have elevated white count and blood culture positive for pseudomonas.      Neuro/Psych:   -- Sedation: NA   -- Pain: PRN tylenol, norco, dilaudid              Cards:   -- HDS  -- hx of HTN  -- PRN hydralazine       Pulm:   -- Goal O2 sat > 90%  -- Wean as able      Renal:  -- No mahan  -- BUN/Cr 17/0.7      FEN / GI:   -- Net +1180cc  -- Replace lytes as needed  -- 100 cc LR IVMF  -- Nutrition: NPO except for meds through G-tube      ID:   -- Tm: afebrile; WBC 7.56  -- Abx Zosyn  -- PCR on 11/22 positive for pseudomonas   -- Repeat bcx ordered for 11/24, not from port       Heme/Onc:   -- H/H stable 7.4/22.0  -- Daily CBC      Endo:   -- Gluc goal 140-180  -- NPO SSI      PPx:   Feeding: NPO  Analgesia/Sedation: NA / PRN tylenol, Norco, dilaudid based on pain scale  Thromboembolic prevention: lovenox 40mg   HOB >30: Yes  Stress Ulcer ppx: famotadine  Glucose control: Critical care goal 140-180 g/dl, ISS    Lines/Drains/Airway: left subclavian port, peripheral IV right sided      Dispo/Code Status/Palliative:   -- Possible Step down today / Full Code

## 2022-11-25 PROBLEM — K75.89 CHOLESTATIC HEPATITIS: Status: ACTIVE | Noted: 2022-11-25

## 2022-11-25 LAB
ALBUMIN SERPL BCP-MCNC: 2.1 G/DL (ref 3.5–5.2)
ALP SERPL-CCNC: 1355 U/L (ref 55–135)
ALT SERPL W/O P-5'-P-CCNC: 131 U/L (ref 10–44)
AMMONIA PLAS-SCNC: 32 UMOL/L (ref 10–50)
ANION GAP SERPL CALC-SCNC: 12 MMOL/L (ref 8–16)
APAP SERPL-MCNC: <3 UG/ML (ref 10–20)
AST SERPL-CCNC: 93 U/L (ref 10–40)
BACTERIA BLD CULT: ABNORMAL
BACTERIA SPEC AEROBE CULT: ABNORMAL
BACTERIA SPEC AEROBE CULT: ABNORMAL
BILIRUB DIRECT SERPL-MCNC: 3 MG/DL (ref 0.1–0.3)
BILIRUB SERPL-MCNC: 4.4 MG/DL (ref 0.1–1)
BUN SERPL-MCNC: 12 MG/DL (ref 8–23)
CALCIUM SERPL-MCNC: 7.8 MG/DL (ref 8.7–10.5)
CERULOPLASMIN SERPL-MCNC: 42 MG/DL (ref 15–45)
CHLORIDE SERPL-SCNC: 99 MMOL/L (ref 95–110)
CO2 SERPL-SCNC: 19 MMOL/L (ref 23–29)
CREAT SERPL-MCNC: 0.8 MG/DL (ref 0.5–1.4)
CRP SERPL-MCNC: 93.6 MG/L (ref 0–8.2)
ERYTHROCYTE [SEDIMENTATION RATE] IN BLOOD BY PHOTOMETRIC METHOD: >120 MM/HR (ref 0–23)
EST. GFR  (NO RACE VARIABLE): >60 ML/MIN/1.73 M^2
FERRITIN SERPL-MCNC: 3244 NG/ML (ref 20–300)
GGT SERPL-CCNC: 1160 U/L (ref 8–55)
GLUCOSE SERPL-MCNC: 70 MG/DL (ref 70–110)
GRAM STN SPEC: ABNORMAL
HAPTOGLOB SERPL-MCNC: 355 MG/DL (ref 30–250)
HAV IGM SERPL QL IA: NORMAL
HBV CORE IGM SERPL QL IA: NORMAL
HBV SURFACE AG SERPL QL IA: NORMAL
HCV AB SERPL QL IA: NORMAL
IGA SERPL-MCNC: 148 MG/DL (ref 40–350)
IGG SERPL-MCNC: 673 MG/DL (ref 650–1600)
IGM SERPL-MCNC: 51 MG/DL (ref 50–300)
IRON SERPL-MCNC: 30 UG/DL (ref 45–160)
LDH SERPL L TO P-CCNC: 247 U/L (ref 110–260)
POCT GLUCOSE: 69 MG/DL (ref 70–110)
POCT GLUCOSE: 77 MG/DL (ref 70–110)
POCT GLUCOSE: 83 MG/DL (ref 70–110)
POCT GLUCOSE: 96 MG/DL (ref 70–110)
POTASSIUM SERPL-SCNC: 3.4 MMOL/L (ref 3.5–5.1)
PROT SERPL-MCNC: 5.6 G/DL (ref 6–8.4)
SATURATED IRON: 18 % (ref 20–50)
SODIUM SERPL-SCNC: 130 MMOL/L (ref 136–145)
TOTAL IRON BINDING CAPACITY: 169 UG/DL (ref 250–450)
TRANSFERRIN SERPL-MCNC: 114 MG/DL (ref 200–375)

## 2022-11-25 PROCEDURE — 80143 DRUG ASSAY ACETAMINOPHEN: CPT | Performed by: STUDENT IN AN ORGANIZED HEALTH CARE EDUCATION/TRAINING PROGRAM

## 2022-11-25 PROCEDURE — 82977 ASSAY OF GGT: CPT | Performed by: STUDENT IN AN ORGANIZED HEALTH CARE EDUCATION/TRAINING PROGRAM

## 2022-11-25 PROCEDURE — 86036 ANCA SCREEN EACH ANTIBODY: CPT | Performed by: STUDENT IN AN ORGANIZED HEALTH CARE EDUCATION/TRAINING PROGRAM

## 2022-11-25 PROCEDURE — 86140 C-REACTIVE PROTEIN: CPT | Performed by: STUDENT IN AN ORGANIZED HEALTH CARE EDUCATION/TRAINING PROGRAM

## 2022-11-25 PROCEDURE — 63700000 PHARM REV CODE 250 ALT 637 W/O HCPCS: Performed by: STUDENT IN AN ORGANIZED HEALTH CARE EDUCATION/TRAINING PROGRAM

## 2022-11-25 PROCEDURE — 82248 BILIRUBIN DIRECT: CPT | Performed by: STUDENT IN AN ORGANIZED HEALTH CARE EDUCATION/TRAINING PROGRAM

## 2022-11-25 PROCEDURE — C9113 INJ PANTOPRAZOLE SODIUM, VIA: HCPCS | Performed by: STUDENT IN AN ORGANIZED HEALTH CARE EDUCATION/TRAINING PROGRAM

## 2022-11-25 PROCEDURE — 82728 ASSAY OF FERRITIN: CPT | Performed by: STUDENT IN AN ORGANIZED HEALTH CARE EDUCATION/TRAINING PROGRAM

## 2022-11-25 PROCEDURE — 86381 MITOCHONDRIAL ANTIBODY EACH: CPT | Performed by: STUDENT IN AN ORGANIZED HEALTH CARE EDUCATION/TRAINING PROGRAM

## 2022-11-25 PROCEDURE — 86038 ANTINUCLEAR ANTIBODIES: CPT | Performed by: STUDENT IN AN ORGANIZED HEALTH CARE EDUCATION/TRAINING PROGRAM

## 2022-11-25 PROCEDURE — 83010 ASSAY OF HAPTOGLOBIN QUANT: CPT | Performed by: STUDENT IN AN ORGANIZED HEALTH CARE EDUCATION/TRAINING PROGRAM

## 2022-11-25 PROCEDURE — 86880 COOMBS TEST DIRECT: CPT | Performed by: STUDENT IN AN ORGANIZED HEALTH CARE EDUCATION/TRAINING PROGRAM

## 2022-11-25 PROCEDURE — 25000003 PHARM REV CODE 250: Performed by: STUDENT IN AN ORGANIZED HEALTH CARE EDUCATION/TRAINING PROGRAM

## 2022-11-25 PROCEDURE — 87522 HEPATITIS C REVRS TRNSCRPJ: CPT | Performed by: STUDENT IN AN ORGANIZED HEALTH CARE EDUCATION/TRAINING PROGRAM

## 2022-11-25 PROCEDURE — 25000003 PHARM REV CODE 250

## 2022-11-25 PROCEDURE — 87522 HEPATITIS C REVRS TRNSCRPJ: CPT | Mod: 91 | Performed by: STUDENT IN AN ORGANIZED HEALTH CARE EDUCATION/TRAINING PROGRAM

## 2022-11-25 PROCEDURE — 99223 1ST HOSP IP/OBS HIGH 75: CPT | Mod: GC,,, | Performed by: STUDENT IN AN ORGANIZED HEALTH CARE EDUCATION/TRAINING PROGRAM

## 2022-11-25 PROCEDURE — 63600175 PHARM REV CODE 636 W HCPCS: Performed by: OTOLARYNGOLOGY

## 2022-11-25 PROCEDURE — 85060 BLOOD SMEAR INTERPRETATION: CPT | Mod: ,,, | Performed by: PATHOLOGY

## 2022-11-25 PROCEDURE — 99223 PR INITIAL HOSPITAL CARE,LEVL III: ICD-10-PCS | Mod: GC,,, | Performed by: STUDENT IN AN ORGANIZED HEALTH CARE EDUCATION/TRAINING PROGRAM

## 2022-11-25 PROCEDURE — 63600175 PHARM REV CODE 636 W HCPCS

## 2022-11-25 PROCEDURE — 85652 RBC SED RATE AUTOMATED: CPT | Performed by: STUDENT IN AN ORGANIZED HEALTH CARE EDUCATION/TRAINING PROGRAM

## 2022-11-25 PROCEDURE — 83615 LACTATE (LD) (LDH) ENZYME: CPT | Performed by: STUDENT IN AN ORGANIZED HEALTH CARE EDUCATION/TRAINING PROGRAM

## 2022-11-25 PROCEDURE — 25000003 PHARM REV CODE 250: Performed by: OTOLARYNGOLOGY

## 2022-11-25 PROCEDURE — 84466 ASSAY OF TRANSFERRIN: CPT | Performed by: STUDENT IN AN ORGANIZED HEALTH CARE EDUCATION/TRAINING PROGRAM

## 2022-11-25 PROCEDURE — 82140 ASSAY OF AMMONIA: CPT | Performed by: STUDENT IN AN ORGANIZED HEALTH CARE EDUCATION/TRAINING PROGRAM

## 2022-11-25 PROCEDURE — 86015 ACTIN ANTIBODY EACH: CPT | Performed by: STUDENT IN AN ORGANIZED HEALTH CARE EDUCATION/TRAINING PROGRAM

## 2022-11-25 PROCEDURE — 80074 ACUTE HEPATITIS PANEL: CPT | Performed by: STUDENT IN AN ORGANIZED HEALTH CARE EDUCATION/TRAINING PROGRAM

## 2022-11-25 PROCEDURE — 27000221 HC OXYGEN, UP TO 24 HOURS

## 2022-11-25 PROCEDURE — 82784 ASSAY IGA/IGD/IGG/IGM EACH: CPT | Mod: 59 | Performed by: STUDENT IN AN ORGANIZED HEALTH CARE EDUCATION/TRAINING PROGRAM

## 2022-11-25 PROCEDURE — 36415 COLL VENOUS BLD VENIPUNCTURE: CPT | Performed by: STUDENT IN AN ORGANIZED HEALTH CARE EDUCATION/TRAINING PROGRAM

## 2022-11-25 PROCEDURE — 82390 ASSAY OF CERULOPLASMIN: CPT | Performed by: STUDENT IN AN ORGANIZED HEALTH CARE EDUCATION/TRAINING PROGRAM

## 2022-11-25 PROCEDURE — 63600175 PHARM REV CODE 636 W HCPCS: Performed by: STUDENT IN AN ORGANIZED HEALTH CARE EDUCATION/TRAINING PROGRAM

## 2022-11-25 PROCEDURE — 94761 N-INVAS EAR/PLS OXIMETRY MLT: CPT

## 2022-11-25 PROCEDURE — 80053 COMPREHEN METABOLIC PANEL: CPT | Performed by: STUDENT IN AN ORGANIZED HEALTH CARE EDUCATION/TRAINING PROGRAM

## 2022-11-25 PROCEDURE — 20600001 HC STEP DOWN PRIVATE ROOM

## 2022-11-25 PROCEDURE — 85025 COMPLETE CBC W/AUTO DIFF WBC: CPT | Performed by: STUDENT IN AN ORGANIZED HEALTH CARE EDUCATION/TRAINING PROGRAM

## 2022-11-25 PROCEDURE — 85060 PATHOLOGIST REVIEW: ICD-10-PCS | Mod: ,,, | Performed by: PATHOLOGY

## 2022-11-25 PROCEDURE — 99900035 HC TECH TIME PER 15 MIN (STAT)

## 2022-11-25 RX ORDER — CALCITRIOL 1 UG/ML
0.25 SOLUTION ORAL DAILY
Status: DISCONTINUED | OUTPATIENT
Start: 2022-11-25 | End: 2022-12-13 | Stop reason: HOSPADM

## 2022-11-25 RX ORDER — OXYCODONE HCL 5 MG/5 ML
5 SOLUTION, ORAL ORAL EVERY 4 HOURS PRN
Status: DISCONTINUED | OUTPATIENT
Start: 2022-11-25 | End: 2022-12-13 | Stop reason: HOSPADM

## 2022-11-25 RX ORDER — CALCIUM CARBONATE 200(500)MG
1000 TABLET,CHEWABLE ORAL
Status: DISCONTINUED | OUTPATIENT
Start: 2022-11-25 | End: 2022-12-13 | Stop reason: HOSPADM

## 2022-11-25 RX ORDER — OXYCODONE HYDROCHLORIDE 5 MG/1
5 TABLET ORAL EVERY 6 HOURS PRN
Status: DISCONTINUED | OUTPATIENT
Start: 2022-11-25 | End: 2022-11-25

## 2022-11-25 RX ORDER — OXYCODONE HCL 5 MG/5 ML
5 SOLUTION, ORAL ORAL EVERY 6 HOURS PRN
Status: CANCELLED | OUTPATIENT
Start: 2022-11-25

## 2022-11-25 RX ORDER — CEFEPIME HYDROCHLORIDE 1 G/50ML
2 INJECTION, SOLUTION INTRAVENOUS
Status: DISCONTINUED | OUTPATIENT
Start: 2022-11-25 | End: 2022-11-26

## 2022-11-25 RX ORDER — VANCOMYCIN HCL IN 5 % DEXTROSE 1G/250ML
15 PLASTIC BAG, INJECTION (ML) INTRAVENOUS
Status: DISCONTINUED | OUTPATIENT
Start: 2022-11-26 | End: 2022-11-27

## 2022-11-25 RX ADMIN — VANCOMYCIN HYDROCHLORIDE 1500 MG: 1.5 INJECTION, POWDER, LYOPHILIZED, FOR SOLUTION INTRAVENOUS at 08:11

## 2022-11-25 RX ADMIN — CEFEPIME HYDROCHLORIDE 2 G: 2 INJECTION, SOLUTION INTRAVENOUS at 08:11

## 2022-11-25 RX ADMIN — CALCITRIOL 0.25 MCG: 1 SOLUTION ORAL at 11:11

## 2022-11-25 RX ADMIN — PIPERACILLIN SODIUM AND TAZOBACTAM SODIUM 4.5 G: 4; .5 INJECTION, POWDER, LYOPHILIZED, FOR SOLUTION INTRAVENOUS at 06:11

## 2022-11-25 RX ADMIN — MUPIROCIN: 20 OINTMENT TOPICAL at 09:11

## 2022-11-25 RX ADMIN — ENOXAPARIN SODIUM 40 MG: 40 INJECTION SUBCUTANEOUS at 06:11

## 2022-11-25 RX ADMIN — PIPERACILLIN SODIUM AND TAZOBACTAM SODIUM 4.5 G: 4; .5 INJECTION, POWDER, LYOPHILIZED, FOR SOLUTION INTRAVENOUS at 01:11

## 2022-11-25 RX ADMIN — MICAFUNGIN SODIUM 100 MG: 100 INJECTION, POWDER, LYOPHILIZED, FOR SOLUTION INTRAVENOUS at 09:11

## 2022-11-25 RX ADMIN — CALCIUM CARBONATE (ANTACID) CHEW TAB 500 MG 1000 MG: 500 CHEW TAB at 02:11

## 2022-11-25 RX ADMIN — POLYETHYLENE GLYCOL 3350 17 G: 17 POWDER, FOR SOLUTION ORAL at 09:11

## 2022-11-25 RX ADMIN — PANTOPRAZOLE SODIUM 40 MG: 40 INJECTION, POWDER, FOR SOLUTION INTRAVENOUS at 09:11

## 2022-11-25 RX ADMIN — PIPERACILLIN SODIUM AND TAZOBACTAM SODIUM 4.5 G: 4; .5 INJECTION, POWDER, LYOPHILIZED, FOR SOLUTION INTRAVENOUS at 09:11

## 2022-11-25 RX ADMIN — CALCIUM CARBONATE (ANTACID) CHEW TAB 500 MG 1000 MG: 500 CHEW TAB at 06:11

## 2022-11-25 RX ADMIN — SODIUM CHLORIDE, POTASSIUM CHLORIDE, SODIUM LACTATE AND CALCIUM CHLORIDE: 600; 310; 30; 20 INJECTION, SOLUTION INTRAVENOUS at 12:11

## 2022-11-25 RX ADMIN — CALCIUM CARBONATE (ANTACID) CHEW TAB 500 MG 1000 MG: 500 CHEW TAB at 09:11

## 2022-11-25 RX ADMIN — DEXTROSE 125 ML: 10 SOLUTION INTRAVENOUS at 12:11

## 2022-11-25 NOTE — PROGRESS NOTES
[facility]  Otorhinolaryngology-Head & Neck Surgery  Progress Note    Subjective:     Post-Op Info:  * No surgery found *      Hospital Day: 3     Interval History: NAEON.     Medications:  Continuous Infusions:   lactated ringers 100 mL/hr at 11/24/22 1657     Scheduled Meds:   calcitRIOL  0.25 mcg Per G Tube Daily    calcium carbonate  1,000 mg Per G Tube QID    enoxaparin  40 mg Subcutaneous Daily    mupirocin   Nasal BID    pantoprazole  40 mg Intravenous Daily    piperacillin-tazobactam (ZOSYN) IVPB  4.5 g Intravenous Q8H    polyethylene glycol  17 g Per G Tube Daily     PRN Meds:acetaminophen, dextrose 10%, dextrose 10%, glucagon (human recombinant), hydrALAZINE, HYDROcodone-acetaminophen, insulin aspart U-100, ondansetron, promethazine (PHENERGAN) IVPB, senna-docusate 8.6-50 mg, sodium chloride 0.9%     Review of patient's allergies indicates:   Allergen Reactions    Lovastatin Itching     Objective:     Vital Signs (24h Range):  Temp:  [97.4 °F (36.3 °C)-98.7 °F (37.1 °C)] 98 °F (36.7 °C)  Pulse:  [63-93] 93  Resp:  [14-26] 20  SpO2:  [95 %-100 %] 99 %  BP: (106-134)/(55-69) 106/58       Lines/Drains/Airways       Central Venous Catheter Line  Duration             Port A Cath Single Lumen left subclavian -- days              Drain  Duration                  Gastrostomy/Enterostomy LUQ -- days              Airway  Duration             Adult Surgical Airway Shiley Cuffed -- days              Peripheral Intravenous Line  Duration                  Peripheral IV - Single Lumen 11/22/22 0227 20 G Anterior;Proximal;Right Forearm 3 days                    Physical Exam  NAD  Awake and alert  Mild facial edema   Trach within stoma, removed. Stoma appears healthy, still with redunancy of flap narrowing stoma   Skin graft to midline neck overlying flap, dry  Small area of dehiscence right laterally at area of STSG, packing removed. 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    Patient with elevated WBC and positive blood cultures. Actively vomiting tube feeds during initial evaluation. G-tube decompressed with large amount of tube feeds removed with resolution of vomiting.     Unclear source of infection at this point. Pharyngeal reconstruction defect present at R lateral flap at site of STSG, appears dry without expression of drainage.      ENT/HNS:  - Laryngectomy protocol   -Sign above bed + outside door stating patient is "neck breather" and "can not be intubated by mouth"   -Clean laryngectomy stoma as needed, or daily  - Keep tracheostomy in place     - OK to replace tracheostomy if dislodged, only in place for swelling/secretion assistance   - Humidified O2 via trach collar  - CTM small dehiscence site, appears dry at this time      Neuro:   - Pain: Multimodality PRN regimen initiated     Cardiac:  - HDS  - Page ENT before starting vasopressors  - H/O of paroxysmal a fib, will CTM      Pulmonary:   - PRN breathing treatments  - Humidified air per trach collar  - OK to remove tracheostomy for adequate suctioning      Renal:   - monitor Is and Os  - Cr stable     Infectious Disease:  - Leukocytosis and BC positive for pseudomonas -- now improved    - ID consulted at OSH with recs for zosyn, will continue   - CTM     Hematology/Oncology:  - H/H stable  - Lovenox DVT " prophylaxis      FEN/GI  - Transaminitis and increased alk phos   - U/S of abdomen   - Will initiate trickle TFs   - Postoperative hypoparathyroidism   - Hypocalcemia on initial admission     - Continue tums QID, calcitriol   - Replace electrolytes as needed to keep K>4, Mg>2  - Bowel reg  - Protonix for GI prophylaxis, GERD  - STRICT nausea control ondansetron, phenergan       MSK  - Out of bed as tolerated  - STSG donor site healed     Dispo:  - Continue hospital stay           Jo Ann Bryant MD  Otorhinolaryngology-Head & Neck Surgery  [facility]

## 2022-11-25 NOTE — SUBJECTIVE & OBJECTIVE
Past Medical History:   Diagnosis Date    Hypothyroidism 11/22/2022    PEG (percutaneous endoscopic gastrostomy) adjustment/replacement/removal 11/22/2022    Type 2 diabetes mellitus, without long-term current use of insulin 11/22/2022       No past surgical history on file.    Review of patient's allergies indicates:   Allergen Reactions    Lovastatin Itching       No current facility-administered medications on file prior to encounter.     No current outpatient medications on file prior to encounter.     Family History    None       Tobacco Use    Smoking status: Not on file    Smokeless tobacco: Not on file   Substance and Sexual Activity    Alcohol use: Not on file    Drug use: Not on file    Sexual activity: Not on file     Review of Systems   Constitutional:  Negative for chills, fatigue and fever.   HENT:  Negative for sinus pressure and sinus pain.    Eyes:  Negative for visual disturbance.   Respiratory:  Positive for cough and shortness of breath. Negative for chest tightness.    Cardiovascular:  Negative for chest pain, palpitations and leg swelling.   Gastrointestinal:  Positive for abdominal pain, diarrhea and nausea. Negative for abdominal distention, constipation and vomiting.   Musculoskeletal:  Negative for back pain.   Skin:  Negative for rash.   Neurological:  Positive for speech difficulty. Negative for light-headedness and headaches.   Hematological:  Does not bruise/bleed easily.   Objective:     Vital Signs (Most Recent):  Temp: (!) 95.1 °F (35.1 °C) (11/25/22 1610)  Pulse: 81 (11/25/22 1610)  Resp: 16 (11/25/22 1610)  BP: 123/62 (11/25/22 1610)  SpO2: 97 % (11/25/22 1610)   Vital Signs (24h Range):  Temp:  [95 °F (35 °C)-98.7 °F (37.1 °C)] 95.1 °F (35.1 °C)  Pulse:  [81-93] 81  Resp:  [16-20] 16  SpO2:  [95 %-100 %] 97 %  BP: (106-131)/(58-73) 123/62     Weight: 62.2 kg (137 lb 2 oz)  Body mass index is 22.13 kg/m².    Physical Exam  Vitals and nursing note reviewed.   Constitutional:        General: He is not in acute distress.     Appearance: Normal appearance. He is not ill-appearing.      Comments: Trach present.  G-tube present.  Nonpurulent mucus present at tip of tracheostomy.  Jaundiced with scleral icterus.  No abdominal tenderness.  Bowel sounds normal   HENT:      Head: Normocephalic and atraumatic.      Comments:  face swelling  Eyes:      General: Scleral icterus present.      Pupils: Pupils are equal, round, and reactive to light.   Cardiovascular:      Rate and Rhythm: Normal rate and regular rhythm.      Pulses: Normal pulses.      Heart sounds: Normal heart sounds.   Pulmonary:      Effort: Pulmonary effort is normal. No respiratory distress.      Breath sounds: Normal breath sounds.   Abdominal:      General: Abdomen is flat. Bowel sounds are normal. There is no distension.      Palpations: Abdomen is soft.      Tenderness: There is no abdominal tenderness.   Musculoskeletal:      Right lower leg: No edema.      Left lower leg: No edema.   Lymphadenopathy:      Cervical: No cervical adenopathy.   Skin:     General: Skin is warm and dry.      Capillary Refill: Capillary refill takes less than 2 seconds.      Coloration: Skin is jaundiced.   Neurological:      General: No focal deficit present.      Mental Status: He is alert and oriented to person, place, and time.      Cranial Nerves: No cranial nerve deficit.   Psychiatric:         Mood and Affect: Mood normal.         Behavior: Behavior normal.         Thought Content: Thought content normal.         Judgment: Judgment normal.       Significant Labs: All pertinent labs within the past 24 hours have been reviewed.    Significant Imaging: I have reviewed all pertinent imaging results/findings within the past 24 hours.

## 2022-11-25 NOTE — PLAN OF CARE
Problem: Adult Inpatient Plan of Care  Goal: Plan of Care Review  Outcome: Ongoing, Progressing  Goal: Patient-Specific Goal (Individualized)  Outcome: Ongoing, Progressing  Goal: Absence of Hospital-Acquired Illness or Injury  Outcome: Ongoing, Progressing  Goal: Optimal Comfort and Wellbeing  Outcome: Ongoing, Progressing  Goal: Readiness for Transition of Care  Outcome: Ongoing, Progressing     Problem: Diabetes Comorbidity  Goal: Blood Glucose Level Within Targeted Range  Outcome: Ongoing, Progressing     Problem: Infection  Goal: Absence of Infection Signs and Symptoms  Outcome: Ongoing, Progressing     Problem: Impaired Wound Healing  Goal: Optimal Wound Healing  Outcome: Ongoing, Progressing     Problem: Fall Injury Risk  Goal: Absence of Fall and Fall-Related Injury  Outcome: Ongoing, Progressing     Problem: Skin Injury Risk Increased  Goal: Skin Health and Integrity  Outcome: Ongoing, Progressing

## 2022-11-25 NOTE — PLAN OF CARE
[address] [facility]  Initial Discharge Assessment       Primary Care Provider: Maico Patterson MD    Admission Diagnosis: Post-operative complication [T81.9XXA]    Admission Date: 11/23/2022  Expected Discharge Date:     Discharge Barriers Identified: None    Payor: [employer] MANAGED MEDICARE / Plan: [employer] MEDICARE HMO / Product Type: Capitation /     Extended Emergency Contact Information  Primary Emergency Contact: Janel Batres  Mobile Phone: [phone]  Relation: Spouse  Preferred language: English   needed? No    Discharge Plan A: Home with family  Discharge Plan B: Home Health    No Pharmacies Listed    Initial Assessment (most recent)       Adult Discharge Assessment - 11/25/22 1139          Discharge Assessment    Assessment Type Discharge Planning Reassessment     Confirmed/corrected address, phone number and insurance Yes     Confirmed Demographics Correct on Facesheet     Source of Information family     If unable to respond/provide information was family/caregiver contacted? Yes     Contact Name/Number [phone]     When was your last doctors appointment? 10/11/22     Does patient/caregiver understand observation status Yes     Communicated CHIARA with patient/caregiver Yes     Reason For Admission Post op complications     Lives With spouse     Facility Arrived From: Home     Do you expect to return to your current living situation? Yes     Do you have help at home or someone to help you manage your care at home? Yes     Who are your caregiver(s) and their phone number(s)? [phone]     Prior to hospitilization cognitive status: Alert/Oriented     Current cognitive status: Alert/Oriented     Walking or Climbing Stairs Difficulty none     Dressing/Bathing Difficulty none     Home Accessibility wheelchair accessible     Home Layout Able to live on 1st floor     Equipment Currently Used at Home none     Readmission within 30 days? No     Patient currently being followed by  outpatient case management? No     Do you currently have service(s) that help you manage your care at home? No     Do you take prescription medications? Yes     Do you have prescription coverage? Yes     Coverage [employer] Manged medicare     Do you have any problems affording any of your prescribed medications? No     Is the patient taking medications as prescribed? yes     Who is going to help you get home at discharge? [phone]     How do you get to doctors appointments? car, drives self     Are you on dialysis? No     Do you take coumadin? No     Discharge Plan A Home with family     Discharge Plan B Home Health     DME Needed Upon Discharge  other (see comments)   Unknown at this time    Discharge Plan discussed with: Spouse/sig other     Name(s) and Number(s) [phone]     Discharge Barriers Identified None                      Spoke to pt. Pt lives at home with wife. Post hospital  stay [phone] will be pt support person and pt. has transportation at d/c with wife. There have been no hospitalizations within the last 30 days per pt and pt wife. Verified pt PCP and preferred pharmacy. Pt stated not on Coumadin and is not receiving dialysis. All questions answered regarding case management/ discharge planning , pt verbalized understanding. Discharge booklet with SW contact information given to pt.     Pam Webber LCSW  Case Management/[facility]  [phone]

## 2022-11-25 NOTE — SUBJECTIVE & OBJECTIVE
Interval History: NAEON.     Medications:  Continuous Infusions:   lactated ringers 100 mL/hr at 11/24/22 1657     Scheduled Meds:   calcitRIOL  0.25 mcg Per G Tube Daily    calcium carbonate  1,000 mg Per G Tube QID    enoxaparin  40 mg Subcutaneous Daily    mupirocin   Nasal BID    pantoprazole  40 mg Intravenous Daily    piperacillin-tazobactam (ZOSYN) IVPB  4.5 g Intravenous Q8H    polyethylene glycol  17 g Per G Tube Daily     PRN Meds:acetaminophen, dextrose 10%, dextrose 10%, glucagon (human recombinant), hydrALAZINE, HYDROcodone-acetaminophen, insulin aspart U-100, ondansetron, promethazine (PHENERGAN) IVPB, senna-docusate 8.6-50 mg, sodium chloride 0.9%     Review of patient's allergies indicates:   Allergen Reactions    Lovastatin Itching     Objective:     Vital Signs (24h Range):  Temp:  [97.4 °F (36.3 °C)-98.7 °F (37.1 °C)] 98 °F (36.7 °C)  Pulse:  [63-93] 93  Resp:  [14-26] 20  SpO2:  [95 %-100 %] 99 %  BP: (106-134)/(55-69) 106/58       Lines/Drains/Airways       Central Venous Catheter Line  Duration             Port A Cath Single Lumen left subclavian -- days              Drain  Duration                  Gastrostomy/Enterostomy LUQ -- days              Airway  Duration             Adult Surgical Airway Shiley Cuffed -- days              Peripheral Intravenous Line  Duration                  Peripheral IV - Single Lumen 11/22/22 0227 20 G Anterior;Proximal;Right Forearm 3 days                    Physical Exam  NAD  Awake and alert  Mild facial edema   Trach within stoma, removed. Stoma appears healthy, still with redunancy of flap narrowing stoma   Skin graft to midline neck overlying flap, dry  Small area of dehiscence right laterally at area of STSG, packing removed. Unable to express any drainage  Intraoral flap is soft without fluctuance     Significant Labs:  CBC:   Recent Labs   Lab 11/24/22  0301   WBC 7.56   RBC 2.45*   HGB 7.4*   HCT 22.0*      MCV 90   MCH 30.2   MCHC 33.6     CMP:    Recent Labs   Lab 11/24/22  0301   *   CALCIUM 7.0*   ALBUMIN 1.8*   PROT 5.1*   *   K 4.6   CO2 20*      BUN 17   CREATININE 0.7   ALKPHOS 1,114*   *   *   BILITOT 4.7*       Significant Diagnostics:  None

## 2022-11-25 NOTE — ASSESSMENT & PLAN NOTE
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    Patient with elevated WBC and positive blood cultures. Actively vomiting tube feeds during initial evaluation. G-tube decompressed with large amount of tube feeds removed with resolution of vomiting.     Unclear source of infection at this point. Pharyngeal reconstruction defect present at R lateral flap at site of STSG, appears dry without expression of drainage.      ENT/HNS:  - Laryngectomy protocol   -Sign above bed + outside door stating patient is "neck breather" and "can not be intubated by mouth"   -Clean laryngectomy stoma as needed, or daily  - Keep tracheostomy in place     - OK to replace tracheostomy if dislodged, only in place for swelling/secretion assistance   - Humidified O2 via trach collar  - CTM small dehiscence site, appears dry at this time      Neuro:   - Pain: Multimodality PRN regimen initiated     Cardiac:  - HDS  - Page ENT before starting vasopressors  - H/O of paroxysmal a fib, will CTM      Pulmonary:   - PRN breathing treatments  - Humidified air per trach collar  - OK to remove tracheostomy for adequate suctioning      Renal:   - monitor Is and Os  - Cr stable     Infectious Disease:  - Leukocytosis and BC positive for pseudomonas -- now improved    - ID consulted at OSH with recs for zosyn, will continue   - CTM     Hematology/Oncology:  - H/H stable  - Lovenox DVT prophylaxis      FEN/GI  - Transaminitis and increased alk phos   - U/S of abdomen   - Will initiate trickle TFs   - Postoperative hypoparathyroidism   - Hypocalcemia on initial admission     - Continue tums QID, calcitriol   - Replace electrolytes as needed to keep K>4, Mg>2  - Bowel reg  - Protonix for GI prophylaxis, GERD  - STRICT nausea control ondansetron, phenergan       MSK  - Out of bed as tolerated  - STSG donor site healed     Dispo:  - " Continue hospital stay

## 2022-11-25 NOTE — ASSESSMENT & PLAN NOTE
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  On presentation the patient had marked elevation in alkaline phosphatase, AST/ALT with an are value of 0.49 suggestive of cholestatic pattern of liver injury.  Patient has had multiple hospitalizations with new medications including a course of ampicillin/sulbactam but otherwise no other agents with strong association with this particular pattern of liver injury.  The patient denies any over-the-counter supplements or vitamins.  He denies any pain associated with his abdomen outside of his G-tube.  He denies any changes to his stool color.  He is previously had changes to his tube feeding and was started on trickle feeds over the last 24 hours.  He is not currently on any TPN his blood cultures did grow Pseudomonas in's Candida glabrata but an ultrasound of the right upper quadrant revealed only biliary sludge without any ductal dilation and the patient is without fever making cholangitis/choledocholithiasis very unlikely.  The etiology of the patient's liver injury is most likely drug related and the patient's synthetic liver function remains intact.  We will continue to monitor the patient's labs and follow-up with the labs ordered today while the patient is in hospital.      Plan:  - LDH, CBC with pathologist review, PIPER  - Ammonia, APAP  - GGT, D bili  - Hep A, B, & C with hep C RNA  - KHLOE    - ESR & CRP    - Anti-Sm    - IgG, IgM, IgA  - P-ANCA   - Anti-Rayo   - GGT  - Fe studies, Ferritin   - Ceruloplasm   - RUQ U/S with Doppler study  - TTE with CFD

## 2022-11-25 NOTE — HPI
Patient is a 72-year-old  male with past medical history significant for hypertension, hypothyroidism, type 2 diabetes, GERD, laryngeal cancer status post resection and recently excised neoplasm at base of tongue who just underwent a total glossectomy, pharyngectomy with lateral thigh reconstruction and skin grafting on 11/09/2022.  He was discharged from the hospital on 11/20/2022 and presented 2 days later with shortness of breath, heavy mucus production, and hemoptysis.  Was awake and alert but is unable to speak due to previously aforementioned surgeries.  He is accompanied by his wife who provide supplemental history.  He is previously undergone chemotherapy for his cancer, most recently receiving platinum agents and pembrolizumab in September of this year.  In the emergency room as the outside hospital [place] CT imaging showed an irregular fluid collection in the submandibular space suspicious for phlegmon/abscess.  White count was elevated to 18.6 and the patient was hypocalcemic.  He additionally had marked elevation of his alkaline phosphatase to the 700s as well as new elevations in his bilirubin to 4.8 and AST/ALT most consistent with a cholestatic pattern.  His liver function enzymes continue to worsen and the patient developed new onset jaundice with scleral icterus.  He has no prior history of any liver disease although previous chart review reveals a positive antibody to hepatitis-C.  Initial workup was sent off and Hospital Medicine was consulted for cholestatic hepatitis evaluation.    The patient underwent a abdominal ultrasound which revealed biliary sludge but no obstructing stone.  Blood cultures taken from admission were positive for Candida glabrata as well as Pseudomonas aeruginosa.  The patient was started on piperacillin/tazobactam for empiric antibiotic coverage and following the discovery of a Candida infectious Disease was consulted.

## 2022-11-25 NOTE — PLAN OF CARE
Discussed patient briefly and reviewed chart.     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 He presented on 11/22 to [place] with signs of sepsis. Imaging shows irregular fluid collections with gas concerning for phlegmon/abscess on CT neck and US. The patient has rising LFTs since presentation to [place] (11/22) - ALT down trending and AST remains elevated at 93. GGT of 1160. He was seen by ID at [place] and given piperacillin/tazobactam, low likelihood of drug induced liver injury given his LFTs were high since 11/22. He remains afebrile, and leukocytosis has trended down. Discussed with ENT team.     Recommendations:    Start Micafungin 100 mg q24h IV + Cefepime 2g IV q8h and Vancomycin, pharmacy to dose with goal trough of 10-15  Repeat two sets of blood cultures, ordered.  Please remove Port for source control.   Obtain echocardiogram/TTE  Would consider evaluation of abscess noted       ID team will see patient in AM tomorrow and full consult note to follow. Please call for any additional questions.     Emanuel De La Fuente  PGY4  ID fellow

## 2022-11-25 NOTE — CONSULTS
[facility] Medicine  Consult Note    Patient Name: Cyrus Batres Jr.  MRN: [id number]  Admission Date: 11/23/2022  Hospital Length of Stay: 2 days  Attending Physician: Matheus Levy MD   Primary Care Provider: Maico Patterson MD           Patient information was obtained from patient, spouse/SO, past medical records and ER records.     Inpatient consult to Hospital Medicine-General  Consult performed by: Wes Gonsales MD  Consult ordered by: Henna Mckoy MD        Subjective:     Principal Problem: <principal problem not specified>    Chief Complaint: No chief complaint on file.       HPI: Patient is a 72-year-old  male with past medical history significant for hypertension, hypothyroidism, type 2 diabetes, GERD, laryngeal cancer status post resection and recently excised neoplasm at base of tongue who just underwent a total glossectomy, pharyngectomy with lateral thigh reconstruction and skin grafting on 11/09/2022.  He was discharged from the hospital on 11/20/2022 and presented 2 days later with shortness of breath, heavy mucus production, and hemoptysis.  Was awake and alert but is unable to speak due to previously aforementioned surgeries.  He is accompanied by his wife who provide supplemental history.  He is previously undergone chemotherapy for his cancer, most recently receiving platinum agents and pembrolizumab in September of this year.  In the emergency room as the outside hospital [place] CT imaging showed an irregular fluid collection in the submandibular space suspicious for phlegmon/abscess.  White count was elevated to 18.6 and the patient was hypocalcemic.  He additionally had marked elevation of his alkaline phosphatase to the 700s as well as new elevations in his bilirubin to 4.8 and AST/ALT most consistent with a cholestatic pattern.  His liver function enzymes continue to worsen and the patient developed new onset jaundice with scleral icterus.  He has no  prior history of any liver disease although previous chart review reveals a positive antibody to hepatitis-C.  Initial workup was sent off and Hospital Medicine was consulted for cholestatic hepatitis evaluation.    The patient underwent a abdominal ultrasound which revealed biliary sludge but no obstructing stone.  Blood cultures taken from admission were positive for Candida glabrata as well as Pseudomonas aeruginosa.  The patient was started on piperacillin/tazobactam for empiric antibiotic coverage and following the discovery of a Candida infectious Disease was consulted.      Past Medical History:   Diagnosis Date    Hypothyroidism 11/22/2022    PEG (percutaneous endoscopic gastrostomy) adjustment/replacement/removal 11/22/2022    Type 2 diabetes mellitus, without long-term current use of insulin 11/22/2022       No past surgical history on file.    Review of patient's allergies indicates:   Allergen Reactions    Lovastatin Itching       No current facility-administered medications on file prior to encounter.     No current outpatient medications on file prior to encounter.     Family History    None       Tobacco Use    Smoking status: Not on file    Smokeless tobacco: Not on file   Substance and Sexual Activity    Alcohol use: Not on file    Drug use: Not on file    Sexual activity: Not on file     Review of Systems   Constitutional:  Negative for chills, fatigue and fever.   HENT:  Negative for sinus pressure and sinus pain.    Eyes:  Negative for visual disturbance.   Respiratory:  Positive for cough and shortness of breath. Negative for chest tightness.    Cardiovascular:  Negative for chest pain, palpitations and leg swelling.   Gastrointestinal:  Positive for abdominal pain, diarrhea and nausea. Negative for abdominal distention, constipation and vomiting.   Musculoskeletal:  Negative for back pain.   Skin:  Negative for rash.   Neurological:  Positive for speech difficulty. Negative for  light-headedness and headaches.   Hematological:  Does not bruise/bleed easily.   Objective:     Vital Signs (Most Recent):  Temp: (!) 95.1 °F (35.1 °C) (11/25/22 1610)  Pulse: 81 (11/25/22 1610)  Resp: 16 (11/25/22 1610)  BP: 123/62 (11/25/22 1610)  SpO2: 97 % (11/25/22 1610)   Vital Signs (24h Range):  Temp:  [95 °F (35 °C)-98.7 °F (37.1 °C)] 95.1 °F (35.1 °C)  Pulse:  [81-93] 81  Resp:  [16-20] 16  SpO2:  [95 %-100 %] 97 %  BP: (106-131)/(58-73) 123/62     Weight: 62.2 kg (137 lb 2 oz)  Body mass index is 22.13 kg/m².    Physical Exam  Vitals and nursing note reviewed.   Constitutional:       General: He is not in acute distress.     Appearance: Normal appearance. He is not ill-appearing.      Comments: Trach present.  G-tube present.  Nonpurulent mucus present at tip of tracheostomy.  Jaundiced with scleral icterus.  No abdominal tenderness.  Bowel sounds normal   HENT:      Head: Normocephalic and atraumatic.      Comments:  face swelling  Eyes:      General: Scleral icterus present.      Pupils: Pupils are equal, round, and reactive to light.   Cardiovascular:      Rate and Rhythm: Normal rate and regular rhythm.      Pulses: Normal pulses.      Heart sounds: Normal heart sounds.   Pulmonary:      Effort: Pulmonary effort is normal. No respiratory distress.      Breath sounds: Normal breath sounds.   Abdominal:      General: Abdomen is flat. Bowel sounds are normal. There is no distension.      Palpations: Abdomen is soft.      Tenderness: There is no abdominal tenderness.   Musculoskeletal:      Right lower leg: No edema.      Left lower leg: No edema.   Lymphadenopathy:      Cervical: No cervical adenopathy.   Skin:     General: Skin is warm and dry.      Capillary Refill: Capillary refill takes less than 2 seconds.      Coloration: Skin is jaundiced.   Neurological:      General: No focal deficit present.      Mental Status: He is alert and oriented to person, place, and time.      Cranial Nerves: No  cranial nerve deficit.   Psychiatric:         Mood and Affect: Mood normal.         Behavior: Behavior normal.         Thought Content: Thought content normal.         Judgment: Judgment normal.       Significant Labs: All pertinent labs within the past 24 hours have been reviewed.    Significant Imaging: I have reviewed all pertinent imaging results/findings within the past 24 hours.    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  On presentation the patient had marked elevation in alkaline phosphatase, AST/ALT with an are value of 0.49 suggestive of cholestatic pattern of liver injury.  Patient has had multiple hospitalizations with new medications including a course of ampicillin/sulbactam but otherwise no other agents with strong association with this particular pattern of liver injury.  The patient denies any over-the-counter supplements or vitamins.  He denies any pain associated with his abdomen outside of his G-tube.  He denies any changes to his stool color.  He is previously had changes to his tube feeding and was started on trickle feeds over the last 24 hours.  He is not currently on any TPN his blood cultures did grow Pseudomonas in's Candida glabrata but an ultrasound of the right upper quadrant revealed only biliary sludge without any ductal dilation and the patient is without fever making cholangitis/choledocholithiasis very unlikely.  The etiology of the patient's liver injury is most likely drug related and the patient's synthetic liver function remains intact.  We will continue to monitor the patient's labs and follow-up with the labs ordered today while the patient is in hospital.      Plan:  - LDH,  CBC with pathologist review, PIPER  - Ammonia, APAP  - GGT, D bili  - Hep A, B, & C with hep C RNA  - KHLOE    - ESR & CRP    - Anti-Sm    - IgG, IgM, IgA  - P-ANCA   - Anti-Rayo   - GGT  - Fe studies, Ferritin   - Ceruloplasm   - RUQ U/S with Doppler study  - TTE with CFD          VTE Risk Mitigation (From admission, onward)         Ordered     enoxaparin injection 40 mg  Daily         11/23/22 1654     IP VTE HIGH RISK PATIENT  Once         11/23/22 1654     Place sequential compression device  Until discontinued         11/23/22 1654                    Thank you for your consult. I will follow-up with patient. Please contact us if you have any additional questions.    Wes Gonsales MD  Department of Hospital Medicine   [facility]

## 2022-11-25 NOTE — RESPIRATORY THERAPY
RAPID RESPONSE RESPIRATORY THERAPY PROACTIVE NOTE           Time of visit: 836     Code Status: Full Code   : 1950  Bed: 1055/1055 A:   MRN: [id number]  Time spent at the bedside: < 15 min    SITUATION    Evaluated patient for: LDA Check     BACKGROUND    Patient has a past medical history of Hypothyroidism, PEG (percutaneous endoscopic gastrostomy) adjustment/replacement/removal, and Type 2 diabetes mellitus, without long-term current use of insulin.  Clinically Significant Surgical Hx: tracheostomy    24 Hours Vitals Range:  Temp:  [97.4 °F (36.3 °C)-98.7 °F (37.1 °C)]   Pulse:  [63-93]   Resp:  [14-26]   BP: (106-134)/(55-69)   SpO2:  [95 %-100 %]     Labs:    Recent Labs     22  1342 22  1721 22  0301   * 132* 131*   K 4.0 3.6 4.6   CL 98 100 102   CO2 22* 22* 20*   CREATININE 0.6 0.7 0.7   * 190* 126*   PHOS  --  3.6 2.4*   MG 1.8 1.7 2.5        No results for input(s): PH, PCO2, PO2, HCO3, POCSATURATED, BE in the last 72 hours.    ASSESSMENT/INTERVENTIONS  Pt in bed resting, no need for respiratory intervention. Trach clean and secure, all supplies at bedside.      Last VS   Temp: 98 °F (36.7 °C) (800)  Pulse: 93 (800)  Resp: 20 (800)  BP: 106/58 (800)  SpO2: 99 % (800)      Extra trachs at bedside: 6.0 & 4.0 Shiley Cuffed  Level of Consciousness: Level of Consciousness (AVPU): alert  Respiratory Effort: Respiratory Effort: Normal, Unlabored Expansion/Accessory Muscle Usage: Expansion/Accessory Muscles/Retractions: no retractions, no use of accessory muscles, expansion symmetric  All Lung Field Breath Sounds: All Lung Fields Breath Sounds: coarse  O2 Device/Concentration: Trach collar 5L/21%  Surgical airway: Yes, Type: Shiley Size: 6, cuffed  Ambu at bedside: Ambu bag with the patient?: Yes, Adult Ambu     Active Orders   Respiratory Care    Oxygen Continuous     Frequency: Continuous     Number of Occurrences: Until Specified     Order  Questions:      Device type: Low flow      Device: Trach Collar      FiO2%: 28      Titrate O2 per Oxygen Titration Protocol: Yes      To maintain SpO2 goal of: >= 90%      Notify MD of: Inability to achieve desired SpO2; Sudden change in patient status and requires 20% increase in FiO2; Patient requires >60% FiO2    Routine tracheostomy care     Frequency: Q12H     Number of Occurrences: Until Specified       RECOMMENDATIONS    We recommend: RRT Recs: Continue POC per primary team.      FOLLOW-UP    Please call back the Rapid Response RT, Leo Walters RRT at x [phone] for any questions or concerns.

## 2022-11-26 PROBLEM — B49 FUNGEMIA: Status: ACTIVE | Noted: 2022-11-26

## 2022-11-26 PROBLEM — R78.81 BACTEREMIA: Status: ACTIVE | Noted: 2022-11-26

## 2022-11-26 LAB
ALBUMIN SERPL BCP-MCNC: 1.8 G/DL (ref 3.5–5.2)
ALP SERPL-CCNC: 1261 U/L (ref 55–135)
ALT SERPL W/O P-5'-P-CCNC: 121 U/L (ref 10–44)
ANION GAP SERPL CALC-SCNC: 11 MMOL/L (ref 8–16)
ASCENDING AORTA: 3 CM
AST SERPL-CCNC: 100 U/L (ref 10–40)
AV INDEX (PROSTH): 0.76
AV MEAN GRADIENT: 3 MMHG
AV PEAK GRADIENT: 5 MMHG
AV VALVE AREA: 2.64 CM2
AV VELOCITY RATIO: 0.7
BACTERIA BLD CULT: ABNORMAL
BASOPHILS # BLD AUTO: 0.01 K/UL (ref 0–0.2)
BASOPHILS NFR BLD: 0.1 % (ref 0–1.9)
BILIRUB SERPL-MCNC: 3.6 MG/DL (ref 0.1–1)
BSA FOR ECHO PROCEDURE: 1.7 M2
BUN SERPL-MCNC: 10 MG/DL (ref 8–23)
CALCIUM SERPL-MCNC: 7.2 MG/DL (ref 8.7–10.5)
CHLORIDE SERPL-SCNC: 104 MMOL/L (ref 95–110)
CO2 SERPL-SCNC: 19 MMOL/L (ref 23–29)
CREAT SERPL-MCNC: 0.7 MG/DL (ref 0.5–1.4)
CV ECHO LV RWT: 0.28 CM
DAT IGG-SP REAG RBC-IMP: NORMAL
DIFFERENTIAL METHOD: ABNORMAL
DOP CALC AO PEAK VEL: 1.14 M/S
DOP CALC AO VTI: 20.37 CM
DOP CALC LVOT AREA: 3.5 CM2
DOP CALC LVOT DIAMETER: 2.11 CM
DOP CALC LVOT PEAK VEL: 0.8 M/S
DOP CALC LVOT STROKE VOLUME: 53.86 CM3
DOP CALCLVOT PEAK VEL VTI: 15.41 CM
E WAVE DECELERATION TIME: 166.12 MSEC
E/A RATIO: 0.78
E/E' RATIO: 8.56 M/S
ECHO LV POSTERIOR WALL: 0.64 CM (ref 0.6–1.1)
EJECTION FRACTION: 65 %
EOSINOPHIL # BLD AUTO: 0.1 K/UL (ref 0–0.5)
EOSINOPHIL NFR BLD: 1.1 % (ref 0–8)
ERYTHROCYTE [DISTWIDTH] IN BLOOD BY AUTOMATED COUNT: 13.7 % (ref 11.5–14.5)
EST. GFR  (NO RACE VARIABLE): >60 ML/MIN/1.73 M^2
FRACTIONAL SHORTENING: 42 % (ref 28–44)
GLUCOSE SERPL-MCNC: 128 MG/DL (ref 70–110)
HCT VFR BLD AUTO: 22.2 % (ref 40–54)
HGB BLD-MCNC: 7.1 G/DL (ref 14–18)
IMM GRANULOCYTES # BLD AUTO: 0.2 K/UL (ref 0–0.04)
IMM GRANULOCYTES NFR BLD AUTO: 2.7 % (ref 0–0.5)
INTERVENTRICULAR SEPTUM: 0.84 CM (ref 0.6–1.1)
IVRT: 78.02 MSEC
LA MAJOR: 5.05 CM
LA MINOR: 4.97 CM
LA WIDTH: 3.86 CM
LEFT ATRIUM SIZE: 2.79 CM
LEFT ATRIUM VOLUME INDEX MOD: 25.9 ML/M2
LEFT ATRIUM VOLUME INDEX: 27 ML/M2
LEFT ATRIUM VOLUME MOD: 43.97 CM3
LEFT ATRIUM VOLUME: 45.86 CM3
LEFT INTERNAL DIMENSION IN SYSTOLE: 2.64 CM (ref 2.1–4)
LEFT VENTRICLE DIASTOLIC VOLUME INDEX: 56.43 ML/M2
LEFT VENTRICLE DIASTOLIC VOLUME: 95.93 ML
LEFT VENTRICLE MASS INDEX: 62 G/M2
LEFT VENTRICLE SYSTOLIC VOLUME INDEX: 15.1 ML/M2
LEFT VENTRICLE SYSTOLIC VOLUME: 25.61 ML
LEFT VENTRICULAR INTERNAL DIMENSION IN DIASTOLE: 4.57 CM (ref 3.5–6)
LEFT VENTRICULAR MASS: 105.43 G
LV LATERAL E/E' RATIO: 7 M/S
LV SEPTAL E/E' RATIO: 11 M/S
LYMPHOCYTES # BLD AUTO: 0.5 K/UL (ref 1–4.8)
LYMPHOCYTES NFR BLD: 6.1 % (ref 18–48)
MCH RBC QN AUTO: 29.7 PG (ref 27–31)
MCHC RBC AUTO-ENTMCNC: 32 G/DL (ref 32–36)
MCV RBC AUTO: 93 FL (ref 82–98)
MONOCYTES # BLD AUTO: 0.4 K/UL (ref 0.3–1)
MONOCYTES NFR BLD: 5.3 % (ref 4–15)
MV PEAK A VEL: 0.99 M/S
MV PEAK E VEL: 0.77 M/S
MV STENOSIS PRESSURE HALF TIME: 48.17 MS
MV VALVE AREA P 1/2 METHOD: 4.57 CM2
NEUTROPHILS # BLD AUTO: 6.2 K/UL (ref 1.8–7.7)
NEUTROPHILS NFR BLD: 84.7 % (ref 38–73)
NRBC BLD-RTO: 0 /100 WBC
PATH REV BLD -IMP: NORMAL
PISA TR MAX VEL: 2.12 M/S
PLATELET # BLD AUTO: 314 K/UL (ref 150–450)
PMV BLD AUTO: 10 FL (ref 9.2–12.9)
POCT GLUCOSE: 129 MG/DL (ref 70–110)
POCT GLUCOSE: 142 MG/DL (ref 70–110)
POCT GLUCOSE: 163 MG/DL (ref 70–110)
POCT GLUCOSE: 196 MG/DL (ref 70–110)
POTASSIUM SERPL-SCNC: 3.3 MMOL/L (ref 3.5–5.1)
PROT SERPL-MCNC: 5.2 G/DL (ref 6–8.4)
RA MAJOR: 4.03 CM
RA PRESSURE: 3 MMHG
RA WIDTH: 3.39 CM
RBC # BLD AUTO: 2.39 M/UL (ref 4.6–6.2)
RIGHT VENTRICULAR END-DIASTOLIC DIMENSION: 3.26 CM
RV TISSUE DOPPLER FREE WALL SYSTOLIC VELOCITY 1 (APICAL 4 CHAMBER VIEW): 17.44 CM/S
SINUS: 3.41 CM
SODIUM SERPL-SCNC: 134 MMOL/L (ref 136–145)
STJ: 2.77 CM
TDI LATERAL: 0.11 M/S
TDI SEPTAL: 0.07 M/S
TDI: 0.09 M/S
TR MAX PG: 18 MMHG
TRICUSPID ANNULAR PLANE SYSTOLIC EXCURSION: 1.75 CM
TV REST PULMONARY ARTERY PRESSURE: 21 MMHG
WBC # BLD AUTO: 7.36 K/UL (ref 3.9–12.7)

## 2022-11-26 PROCEDURE — 94761 N-INVAS EAR/PLS OXIMETRY MLT: CPT

## 2022-11-26 PROCEDURE — 99900035 HC TECH TIME PER 15 MIN (STAT)

## 2022-11-26 PROCEDURE — 63600175 PHARM REV CODE 636 W HCPCS

## 2022-11-26 PROCEDURE — 99900026 HC AIRWAY MAINTENANCE (STAT)

## 2022-11-26 PROCEDURE — 63600175 PHARM REV CODE 636 W HCPCS: Performed by: STUDENT IN AN ORGANIZED HEALTH CARE EDUCATION/TRAINING PROGRAM

## 2022-11-26 PROCEDURE — 25000003 PHARM REV CODE 250: Performed by: STUDENT IN AN ORGANIZED HEALTH CARE EDUCATION/TRAINING PROGRAM

## 2022-11-26 PROCEDURE — 63600175 PHARM REV CODE 636 W HCPCS: Performed by: OTOLARYNGOLOGY

## 2022-11-26 PROCEDURE — 25000003 PHARM REV CODE 250

## 2022-11-26 PROCEDURE — 99223 PR INITIAL HOSPITAL CARE,LEVL III: ICD-10-PCS | Mod: ,,, | Performed by: STUDENT IN AN ORGANIZED HEALTH CARE EDUCATION/TRAINING PROGRAM

## 2022-11-26 PROCEDURE — 87040 BLOOD CULTURE FOR BACTERIA: CPT | Mod: 59 | Performed by: STUDENT IN AN ORGANIZED HEALTH CARE EDUCATION/TRAINING PROGRAM

## 2022-11-26 PROCEDURE — 99223 1ST HOSP IP/OBS HIGH 75: CPT | Mod: ,,, | Performed by: STUDENT IN AN ORGANIZED HEALTH CARE EDUCATION/TRAINING PROGRAM

## 2022-11-26 PROCEDURE — 80053 COMPREHEN METABOLIC PANEL: CPT | Performed by: STUDENT IN AN ORGANIZED HEALTH CARE EDUCATION/TRAINING PROGRAM

## 2022-11-26 PROCEDURE — 99232 SBSQ HOSP IP/OBS MODERATE 35: CPT | Mod: GC,,, | Performed by: STUDENT IN AN ORGANIZED HEALTH CARE EDUCATION/TRAINING PROGRAM

## 2022-11-26 PROCEDURE — C9113 INJ PANTOPRAZOLE SODIUM, VIA: HCPCS | Performed by: STUDENT IN AN ORGANIZED HEALTH CARE EDUCATION/TRAINING PROGRAM

## 2022-11-26 PROCEDURE — 99232 PR SUBSEQUENT HOSPITAL CARE,LEVL II: ICD-10-PCS | Mod: GC,,, | Performed by: STUDENT IN AN ORGANIZED HEALTH CARE EDUCATION/TRAINING PROGRAM

## 2022-11-26 PROCEDURE — 27200966 HC CLOSED SUCTION SYSTEM

## 2022-11-26 PROCEDURE — 25000003 PHARM REV CODE 250: Performed by: OTOLARYNGOLOGY

## 2022-11-26 PROCEDURE — 27000221 HC OXYGEN, UP TO 24 HOURS

## 2022-11-26 PROCEDURE — 20600001 HC STEP DOWN PRIVATE ROOM

## 2022-11-26 PROCEDURE — 36415 COLL VENOUS BLD VENIPUNCTURE: CPT | Performed by: STUDENT IN AN ORGANIZED HEALTH CARE EDUCATION/TRAINING PROGRAM

## 2022-11-26 PROCEDURE — 63700000 PHARM REV CODE 250 ALT 637 W/O HCPCS: Performed by: STUDENT IN AN ORGANIZED HEALTH CARE EDUCATION/TRAINING PROGRAM

## 2022-11-26 RX ORDER — FAMOTIDINE 40 MG/5ML
20 POWDER, FOR SUSPENSION ORAL 2 TIMES DAILY
Status: CANCELLED | OUTPATIENT
Start: 2022-11-26

## 2022-11-26 RX ADMIN — CEFEPIME HYDROCHLORIDE 2 G: 2 INJECTION, SOLUTION INTRAVENOUS at 12:11

## 2022-11-26 RX ADMIN — MEROPENEM 2 G: 1 INJECTION INTRAVENOUS at 04:11

## 2022-11-26 RX ADMIN — MUPIROCIN: 20 OINTMENT TOPICAL at 09:11

## 2022-11-26 RX ADMIN — VANCOMYCIN HYDROCHLORIDE 1000 MG: 1 INJECTION, POWDER, LYOPHILIZED, FOR SOLUTION INTRAVENOUS at 08:11

## 2022-11-26 RX ADMIN — ENOXAPARIN SODIUM 40 MG: 40 INJECTION SUBCUTANEOUS at 04:11

## 2022-11-26 RX ADMIN — VANCOMYCIN HYDROCHLORIDE 1000 MG: 1 INJECTION, POWDER, LYOPHILIZED, FOR SOLUTION INTRAVENOUS at 11:11

## 2022-11-26 RX ADMIN — CALCIUM CARBONATE (ANTACID) CHEW TAB 500 MG 1000 MG: 500 CHEW TAB at 09:11

## 2022-11-26 RX ADMIN — MEROPENEM 2 G: 1 INJECTION INTRAVENOUS at 11:11

## 2022-11-26 RX ADMIN — CALCITRIOL 0.25 MCG: 1 SOLUTION ORAL at 09:11

## 2022-11-26 RX ADMIN — POTASSIUM PHOSPHATE, MONOBASIC AND POTASSIUM PHOSPHATE, DIBASIC 15 MMOL: 224; 236 INJECTION, SOLUTION, CONCENTRATE INTRAVENOUS at 04:11

## 2022-11-26 RX ADMIN — INSULIN ASPART 2 UNITS: 100 INJECTION, SOLUTION INTRAVENOUS; SUBCUTANEOUS at 06:11

## 2022-11-26 RX ADMIN — CALCIUM CARBONATE (ANTACID) CHEW TAB 500 MG 1000 MG: 500 CHEW TAB at 04:11

## 2022-11-26 RX ADMIN — CALCIUM CARBONATE (ANTACID) CHEW TAB 500 MG 1000 MG: 500 CHEW TAB at 02:11

## 2022-11-26 RX ADMIN — PANTOPRAZOLE SODIUM 40 MG: 40 INJECTION, POWDER, FOR SOLUTION INTRAVENOUS at 09:11

## 2022-11-26 RX ADMIN — SODIUM CHLORIDE, POTASSIUM CHLORIDE, SODIUM LACTATE AND CALCIUM CHLORIDE: 600; 310; 30; 20 INJECTION, SOLUTION INTRAVENOUS at 06:11

## 2022-11-26 RX ADMIN — CEFEPIME HYDROCHLORIDE 2 G: 2 INJECTION, SOLUTION INTRAVENOUS at 04:11

## 2022-11-26 RX ADMIN — MICAFUNGIN SODIUM 100 MG: 100 INJECTION, POWDER, LYOPHILIZED, FOR SOLUTION INTRAVENOUS at 09:11

## 2022-11-26 NOTE — ASSESSMENT & PLAN NOTE
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    Patient with elevated WBC and positive blood cultures. Actively vomiting tube feeds during initial evaluation. G-tube decompressed with large amount of tube feeds removed with resolution of vomiting.     Unclear source of infection at this point. Pharyngeal reconstruction defect present at R lateral flap at site of STSG, appears dry without expression of drainage.      ENT/HNS:  - Laryngectomy protocol   -Sign above bed + outside door stating patient is "neck breather" and "can not be intubated by mouth"   -Clean laryngectomy stoma as needed, or daily  - Keep tracheostomy in place     - OK to replace tracheostomy if dislodged, only in place for swelling/secretion assistance   - Humidified O2 via trach collar  - CTM small dehiscence site, appears dry at this time      Neuro:   - Pain: Multimodality PRN regimen initiated     Cardiac:  - HDS  - Page ENT before starting vasopressors  - H/O of paroxysmal a fib, will CTM      Pulmonary:   - PRN breathing treatments  - Humidified air per trach collar  - OK to remove tracheostomy for adequate suctioning      Renal:   - monitor Is and Os  - Cr stable     Infectious Disease:  - Leukocytosis and BC positive for pseudomonas -- now improved WBC \  - ID consulted at OSH; ID consulted at [facility], appreciate recommendations   - TTE unremarkable    - Request repeat CT neck to assess for surgical site as source    - Request port removal as possible source given fungemia, discussed with patient will call general surgery to discuss poss timing    - Antibiotics per ID team, discussed current POC to escalate anitbiotics  Antibiotics (From admission, onward)    Start     Stop Route Frequency Ordered    11/26/22 1530  meropenem (MERREM) 2 g in sodium chloride 0.9% 100 mL IVPB         -- IV Every 8 hours (non-standard " times) 11/26/22 1420    11/26/22 0830  vancomycin in dextrose 5 % 1 gram/250 mL IVPB 1,000 mg         -- IV Every 12 hours (non-standard times) 11/25/22 2053    11/24/22 0900  mupirocin 2 % ointment         11/29 0859 Nasl 2 times daily 11/24/22 0319          - CTM     Hematology/Oncology:  - H/H stable  - Lovenox DVT prophylaxis      FEN/GI  - Transaminitis and increased alk phos   - U/S of abdomen -- biliary sludge, otherwise unremarkable   - ongoing workup per medicine  - Will initiate trickle TFs   - Postoperative hypoparathyroidism   - Hypocalcemia on initial admission     - Continue tums QID, calcitriol   - Replace electrolytes as needed to keep K>4, Mg>2  - Bowel reg  - Protonix for GI prophylaxis, GERD  - STRICT nausea control ondansetron, phenergan       MSK  - Out of bed as tolerated  - STSG donor site healed     Dispo:  - Continue hospital stay, ongoing ID/IM workup for transaminitis and fungemia/septicemia

## 2022-11-26 NOTE — RESPIRATORY THERAPY
RAPID RESPONSE RESPIRATORY THERAPY PROACTIVE NOTE           Time of visit: 1030     Code Status: Full Code   : 1950  Bed: 1055/1055 A:   MRN: [id number]  Time spent at the bedside: < 15 min    SITUATION    Evaluated patient for: LDA Check     BACKGROUND    Patient has a past medical history of Hypothyroidism, PEG (percutaneous endoscopic gastrostomy) adjustment/replacement/removal, and Type 2 diabetes mellitus, without long-term current use of insulin.  Clinically Significant Surgical Hx: tracheostomy    24 Hours Vitals Range:  Temp:  [95.1 °F (35.1 °C)-98.1 °F (36.7 °C)]   Pulse:  [74-88]   Resp:  [16-20]   BP: (112-129)/(57-71)   SpO2:  [92 %-99 %]     Labs:    Recent Labs     22  1721 22  0301 22  1430 22  0831   * 131* 130* 134*   K 3.6 4.6 3.4* 3.3*    102 99 104   CO2 22* 20* 19* 19*   CREATININE 0.7 0.7 0.8 0.7   * 126* 70 128*   PHOS 3.6 2.4*  --   --    MG 1.7 2.5  --   --         No results for input(s): PH, PCO2, PO2, HCO3, POCSATURATED, BE in the last 72 hours.    ASSESSMENT/INTERVENTIONS  Pt resting comfortable with no respiratory needs at this time.      Last VS   Temp: 98 °F (36.7 °C) ( 110)  Pulse: 82 ( 1102)  Resp: 18 ( 1102)  BP: 120/66 ( 110)  SpO2: 98 % ( 1355)      Extra trachs at bedside: yes  Level of Consciousness: Level of Consciousness (AVPU): alert  Respiratory Effort: Respiratory Effort: Unlabored Expansion/Accessory Muscle Usage: Expansion/Accessory Muscles/Retractions: no use of accessory muscles, no retractions, expansion symmetric  All Lung Field Breath Sounds: All Lung Fields Breath Sounds: diminished, clear, equal bilaterally  O2 Device/Concentration: T.C. 5L/28%  Surgical airway: Yes, Type: Shiley Size: 6, cuffed  Ambu at bedside: Ambu bag with the patient?: Yes, Adult Ambu     Active Orders   Respiratory Care    Oxygen Continuous     Frequency: Continuous     Number of Occurrences: Until Specified      Order Questions:      Device type: Low flow      Device: Trach Collar      FiO2%: 28      Titrate O2 per Oxygen Titration Protocol: Yes      To maintain SpO2 goal of: >= 90%      Notify MD of: Inability to achieve desired SpO2; Sudden change in patient status and requires 20% increase in FiO2; Patient requires >60% FiO2    Routine tracheostomy care     Frequency: Q12H     Number of Occurrences: Until Specified       RECOMMENDATIONS    We recommend: RRT Recs: Continue POC per primary team.      FOLLOW-UP    Please call back the Rapid Response RT, Antonio Roberts RRT at x [phone] for any questions or concerns.

## 2022-11-26 NOTE — CONSULTS
[facility]  Infectious Disease  Consult Note    Patient Name: Cyrus Batres Jr.  MRN: [id number]  Admission Date: 11/23/2022  Hospital Length of Stay: 3 days  Attending Physician: Matheus Levy MD  Primary Care Provider: Maico Patterson MD     Isolation Status: No active isolations    Patient information was obtained from patient, past medical records, ER records, and primary team.      Inpatient consult to Infectious Diseases  Consult performed by: Thania Cuevas MD  Consult ordered by: Ronaldo Hyatt MD      Assessment/Plan:     Bacteremia  See fungemia    Fungemia  Cglab isolated from OSH - suspect from fluid collection seen on CT from OSH. Also noted to have Pseudomonas bacteremia as well as pseudomonas isolated from trach aspirate. TTE pending.     Called micro from [facility] to send cglab isolate for susceptibilities.     Recommendations:  -repeat CT neck - concern for abscess given microorgs isolated  -port removal given fungemia - d/w pt, pt amenable   -once port removed, recommend 2 sets of blcx  -repeat blcx to document cleareance   -continue micafungin, vanc and meropenem (1 isolate with cefepime BENI to 4). Would avoid ptz/vanc combination at this time given risk of nephrotoxicity with multiple contrast exposure   -avoiding azoles in setting of elevated liver enzymes  -follow up cx  opthalmologic exam on Monday given candidemia and reported vision changes      Thank you for your consult. I will follow-up with patient. Please contact us if you have any additional questions. Above d/w primary team.     Time: 70 minutes   50% of time spent on face-to-face counseling and coordination of care. Counseling included review of test results, diagnosis, and treatment plan with patient and/or family.        Thania Cuevas MD  Infectious Disease  [facility]    Subjective:     Principal Problem: <principal problem not specified>    HPI: 71 yo male with advanced SCC of larynx/tongue (maintained on  carbo/pem/5FU, last received 8/2022) s/p glossectomy/total pharyngectomy with flap on 11/9 transferred from [facility] for ENT evaluation. ID consulted for abx recs. While at [facility], pt was found to have bacteremia (pseudomonas, CONS) and Cglab fungemia. Trach cx site also with Pseudomonas. CTneck at OSH with irregular fluid collection c/f abscess along submandibular space and above tracheostomy site. Pt is currently on cefepime, vanco, and micafungin. ECHO pending. Pt reported increased drainage from trach site that started recently.       Past Medical History:   Diagnosis Date    Hypothyroidism 11/22/2022    PEG (percutaneous endoscopic gastrostomy) adjustment/replacement/removal 11/22/2022    Type 2 diabetes mellitus, without long-term current use of insulin 11/22/2022       No past surgical history on file.    Review of patient's allergies indicates:   Allergen Reactions    Lovastatin Itching       Medications:  No medications prior to admission.     Antibiotics (From admission, onward)      Start     Stop Route Frequency Ordered    11/26/22 0830  vancomycin in dextrose 5 % 1 gram/250 mL IVPB 1,000 mg         -- IV Every 12 hours (non-standard times) 11/25/22 2053    11/25/22 2000  cefepime in dextrose 5 % IVPB 2 g         -- IV Every 8 hours (non-standard times) 11/25/22 1850    11/24/22 0900  mupirocin 2 % ointment         11/29 0859 Nasl 2 times daily 11/24/22 0319          Antifungals (From admission, onward)      Start     Stop Route Frequency Ordered    11/25/22 2000  micafungin 100 mg in sodium chloride 0.9 % 100 mL IVPB (ready to mix system)         -- IV Every 24 hours (non-standard times) 11/25/22 1850          Antivirals (From admission, onward)      None               There is no immunization history on file for this patient.    Family History    None       Social History     Socioeconomic History    Marital status:      Social Determinants of Health     Financial Resource Strain: Low Risk      Difficulty of Paying Living Expenses: Not very hard   Food Insecurity: Unknown    Worried About Running Out of Food in the Last Year: Never true   Transportation Needs: No Transportation Needs    Lack of Transportation (Medical): No    Lack of Transportation (Non-Medical): No   Physical Activity: Unknown    Days of Exercise per Week: Patient refused    Minutes of Exercise per Session: Patient refused   Stress: Stress Concern Present    Feeling of Stress : To some extent   Social Connections: Unknown    Frequency of Communication with Friends and Family: Patient refused    Frequency of Social Gatherings with Friends and Family: Patient refused    Attends Yazidi Services: Patient refused    Active Member of Clubs or Organizations: Patient refused    Attends Club or Organization Meetings: Patient refused    Marital Status:    Housing Stability: Low Risk     Unable to Pay for Housing in the Last Year: No    Number of Places Lived in the Last Year: 1    Unstable Housing in the Last Year: No     Review of Systems   Constitutional:  Negative for chills and fever.   Eyes:  Positive for visual disturbance.   Skin:  Positive for wound.   All other systems reviewed and are negative.  Objective:     Vital Signs (Most Recent):  Temp: 98 °F (36.7 °C) (11/26/22 0744)  Pulse: 82 (11/26/22 0744)  Resp: 16 (11/26/22 0744)  BP: 129/71 (11/26/22 0744)  SpO2: 99 % (11/26/22 0744) Vital Signs (24h Range):  Temp:  [95 °F (35 °C)-98.1 °F (36.7 °C)] 98 °F (36.7 °C)  Pulse:  [74-88] 82  Resp:  [16-20] 16  SpO2:  [92 %-100 %] 99 %  BP: (112-131)/(57-73) 129/71     Weight: 62.1 kg (137 lb)  Body mass index is 22.11 kg/m².    Estimated Creatinine Clearance: 74.4 mL/min (based on SCr of 0.8 mg/dL).    Physical Exam  Constitutional:       General: He is not in acute distress.     Appearance: He is not ill-appearing, toxic-appearing or diaphoretic.   HENT:      Head: Normocephalic and atraumatic.      Right Ear: External ear normal.       Left Ear: External ear normal.      Mouth/Throat:      Mouth: Mucous membranes are moist.   Eyes:      General: Scleral icterus present.         Right eye: No discharge.         Left eye: No discharge.   Neck:      Comments: trach  Cardiovascular:      Rate and Rhythm: Normal rate and regular rhythm.   Pulmonary:      Effort: Pulmonary effort is normal. No respiratory distress.      Breath sounds: No stridor. No wheezing or rhonchi.   Abdominal:      General: Bowel sounds are normal. There is no distension.      Palpations: Abdomen is soft.      Tenderness: There is no abdominal tenderness. There is no guarding.      Comments: PEG - no erythema or induration       Musculoskeletal:      Right lower leg: No edema.      Left lower leg: No edema.   Skin:     General: Skin is warm and dry.      Coloration: Skin is not jaundiced.      Findings: No bruising.      Comments: Drainage from R trach - bloody purulent  Port  L leg surgical site -no erythema or induration       Neurological:      Mental Status: He is alert and oriented to person, place, and time.      Motor: No weakness.       Significant Labs:   Microbiology Results (last 7 days)       Procedure Component Value Units Date/Time    Blood culture [[id number]]     Order Status: Sent Specimen: Blood     Blood culture [[id number]]     Order Status: Sent Specimen: Blood             Significant Imaging: I have reviewed all pertinent imaging results/findings within the past 24 hours.

## 2022-11-26 NOTE — PLAN OF CARE
POC reviewed with Pt. Pt uses written communication. AAOx4. VSS on 5L 28% trach collar. Gtube with TF started @ 10 mL/hr, denies n/v. Voiding per urinal, adequate UOP. Up in chair. Bilateral neck incision with staples, CDI. Left thigh with staples, CDI. Right leg graft side intact. Sacral breakdown with foam dressing. Denies pain. Bed in lowest position, call light within reach. WCTM.

## 2022-11-26 NOTE — SUBJECTIVE & OBJECTIVE
Interval History: AFVSS. No acute events overnight. States he feels in his usual state of health, mild increased facial edema this AM.     Medications:  Continuous Infusions:   lactated ringers 100 mL/hr at 11/25/22 1247     Scheduled Meds:   calcitrioL  0.25 mcg Per G Tube Daily    calcium carbonate  1,000 mg Per G Tube 5x Daily    enoxaparin  40 mg Subcutaneous Daily    meropenem (MERREM) IVPB  2 g Intravenous Q8H    micafungin (MYCAMINE) IVPB  100 mg Intravenous Q24H    mupirocin   Nasal BID    pantoprazole  40 mg Intravenous Daily    polyethylene glycol  17 g Per G Tube Daily    vancomycin (VANCOCIN) IVPB  15 mg/kg Intravenous Q12H     PRN Meds:acetaminophen, dextrose 10%, dextrose 10%, glucagon (human recombinant), hydrALAZINE, insulin aspart U-100, ondansetron, oxyCODONE, promethazine (PHENERGAN) IVPB, senna-docusate 8.6-50 mg, sodium chloride 0.9%     Review of patient's allergies indicates:   Allergen Reactions    Lovastatin Itching     Objective:     Vital Signs (24h Range):  Temp:  [95.1 °F (35.1 °C)-98.1 °F (36.7 °C)] 98 °F (36.7 °C)  Pulse:  [74-88] 82  Resp:  [16-20] 18  SpO2:  [92 %-99 %] 98 %  BP: (112-129)/(57-71) 120/66     Date 11/26/22 0700 - 11/27/22 0659   Shift 2789-4169 4726-1428 7585-3370 24 Hour Total   INTAKE   NG/   170   Shift Total(mL/kg) 170(2.7)   170(2.7)   OUTPUT   Drains 0   0   Shift Total(mL/kg) 0(0)   0(0)   Weight (kg) 62.1 62.1 62.1 62.1     Lines/Drains/Airways       Central Venous Catheter Line  Duration             Port A Cath Single Lumen left subclavian -- days              Drain  Duration                  Gastrostomy/Enterostomy LUQ -- days              Airway  Duration             Adult Surgical Airway Shiley Cuffed -- days                    Physical Exam  NAD  Awake and alert  Mild facial edema   Trach within stoma, removed. Stoma appears healthy  Skin graft to midline neck overlying flap, at left superior portion small area of breakdown though without drainage  and remains with adequate coverage  Small area of dehiscence right laterally, near area of STSG, packing removed. Unable to express any drainage  Intraoral flap is soft without fluctuance, secretions thin, clear intraorally    Significant Labs:  CBC:   Recent Labs   Lab 11/25/22  2342   WBC 7.36   RBC 2.39*   HGB 7.1*   HCT 22.2*      MCV 93   MCH 29.7   MCHC 32.0     CMP:   Recent Labs   Lab 11/26/22  0831   *   CALCIUM 7.2*   ALBUMIN 1.8*   PROT 5.2*   *   K 3.3*   CO2 19*      BUN 10   CREATININE 0.7   ALKPHOS 1,261*   *   *   BILITOT 3.6*       Significant Diagnostics:  I have reviewed all pertinent imaging results/findings within the past 24 hours.

## 2022-11-26 NOTE — PROGRESS NOTES
Pharmacokinetic Initial Assessment: IV Vancomycin    Assessment/Plan:    Initiate intravenous vancomycin with loading dose of 1500 mg once followed by a maintenance dose of vancomycin 1000mg IV every 12 hours  Desired empiric serum trough concentration is 15 to 20 mcg/mL  Draw vancomycin trough level 60 min prior to fourth dose on 11/27 at approximately 0730  Pharmacy will continue to follow and monitor vancomycin.      Please contact pharmacy at extension [phone] with any questions regarding this assessment.     Thank you for the consult,   Cedrick Hamilton       Patient brief summary:  Cyrus Batres Jr. is a 72 y.o. male initiated on antimicrobial therapy with IV Vancomycin for treatment of suspected bacteremia    Drug Allergies:   Review of patient's allergies indicates:   Allergen Reactions    Lovastatin Itching       Actual Body Weight:   62.1kg    Renal Function:   Estimated Creatinine Clearance: 74.4 mL/min (based on SCr of 0.8 mg/dL).,     Dialysis Method (if applicable):  N/A    CBC (last 72 hours):  Recent Labs   Lab Result Units 11/23/22  0311 11/23/22  1342 11/23/22  1721 11/24/22  0301   WBC K/uL 9.45  --  8.43 7.56   Hemoglobin g/dL 7.9* 8.0* 8.0* 7.4*   Hematocrit % 25.2* 24.2* 23.9* 22.0*   Platelets K/uL 307  --  323 302   Gran % % 92.6*  --  91.1* 88.0*   Lymph % % 3.1*  --  2.8* 3.6*   Mono % % 3.1*  --  4.4 5.6   Eosinophil % % 0.6  --  0.9 1.7   Basophil % % 0.2  --  0.1 0.3   Differential Method  Automated  --  Automated Automated       Metabolic Panel (last 72 hours):  Recent Labs   Lab Result Units 11/23/22  0311 11/23/22  1342 11/23/22  1721 11/24/22  0301 11/25/22  1430   Sodium mmol/L 132* 130* 132* 131* 130*   Potassium mmol/L 3.0* 4.0 3.6 4.6 3.4*   Chloride mmol/L 102 98 100 102 99   CO2 mmol/L 21* 22* 22* 20* 19*   Glucose mg/dL 100 141* 190* 126* 70   BUN mg/dL 24* 19 20 17 12   Creatinine mg/dL 0.7 0.6 0.7 0.7 0.8   Albumin g/dL 2.4*  --  2.0* 1.8* 2.1*   Total Bilirubin mg/dL 5.0*  --  5.3*  4.7* 4.4*   Alkaline Phosphatase U/L 767*  --  1,111* 1,114* 1,355*   AST U/L 82*  --  108* 112* 93*   ALT U/L 152*  --  154* 145* 131*   Magnesium mg/dL 1.8 1.8 1.7 2.5  --    Phosphorus mg/dL  --   --  3.6 2.4*  --        Drug levels (last 3 results):  No results for input(s): VANCOMYCINRA, VANCORANDOM, VANCOMYCINPE, VANCOPEAK, VANCOMYCINTR, VANCOTROUGH in the last 72 hours.    Microbiologic Results:  Microbiology Results (last 7 days)       ** No results found for the last 168 hours. **

## 2022-11-26 NOTE — ASSESSMENT & PLAN NOTE
Cglab isolated from OSH - suspect from fluid collection seen on CT from OSH. Also noted to have Pseudomonas bacteremia as well as pseudomonas isolated from trach aspirate. TTE pending.     Called micro from [facility] to send cglab isolate for susceptibilities.     Recommendations:  -repeat CT neck - concern for abscess given microorgs isolated  -port removed given fungemia - d/w pt, pt amenable  -repeat blcx to document cleareance   -continue micafungin, vanc and meropenem (1 isolate with cefepime BENI to 4). Would avoid ptz/vanc combination at this time given risk of nephrotoxicity with multiple contrast exposure   -avoiding azoles in setting of elevated liver enzymes  -follow up cx  opthalmologic exam on Monday given candidemia and reported vision changes

## 2022-11-26 NOTE — PLAN OF CARE
Pt alert and oriented x 4, no distress, no complaints of pain, pt remains NPO on TF running at goal of 10 ml/hr, pt has a laryngectomy on RA, pt up sitting in recliner, pt encouraged to reposition and ambulate. Pt on IVF running at 100 ml/hr. Spouse at bedside, call light in reach.

## 2022-11-26 NOTE — ASSESSMENT & PLAN NOTE
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  On presentation the patient had marked elevation in alkaline phosphatase, AST/ALT with an are value of 0.49 suggestive of cholestatic pattern of liver injury.  Patient has had multiple hospitalizations with new medications including a course of ampicillin/sulbactam but otherwise no other agents with strong association with this particular pattern of liver injury.  The patient denies any over-the-counter supplements or vitamins.  He denies any pain associated with his abdomen outside of his G-tube.  He denies any changes to his stool color.  He is previously had changes to his tube feeding and was started on trickle feeds over the last 24 hours.  He is not currently on any TPN his blood cultures did grow Pseudomonas in's Candida glabrata but an ultrasound of the right upper quadrant revealed only biliary sludge without any ductal dilation and the patient is without fever making cholangitis/choledocholithiasis very unlikely.  The etiology of the patient's liver injury is most likely drug related and the patient's synthetic liver function remains intact.  We will continue to monitor the patient's labs and follow-up with the labs ordered today while the patient is in hospital.    Hemolysis labs not c/w hemolysis.   Confirmed hepatobiliary source without evidence of liver failure (d/t normal ammonia and synthetic function)  ESR/CRP elevated, unclear if d/t bacteremia/fungemia or from recent surgery. ID consulted for fungemia.  Anti-smooth muscle, mitochondria pending, KHLOE pending  Immunoglobins WNL  Iron and copper labs not suggestive of hemochromatosis or copper metabolism disorder.  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 Will continue to follow. Most likely DILI in setting of previous hospital visit. Etiology include medications given during operation, antibiotics, or potentially anti-nausea medications.      Plan:  - daily CMP

## 2022-11-26 NOTE — PROGRESS NOTES
[facility] Medicine  Progress Note    Patient Name: Cyrus Batres Jr.  MRN: [id number]  Patient Class: IP- Inpatient   Admission Date: 11/23/2022  Length of Stay: 3 days  Attending Physician: Matheus Levy MD  Primary Care Provider: Maico Patterson MD        Subjective:     Principal Problem:<principal problem not specified>        HPI:  Patient is a 72-year-old  male with past medical history significant for hypertension, hypothyroidism, type 2 diabetes, GERD, laryngeal cancer status post resection and recently excised neoplasm at base of tongue who just underwent a total glossectomy, pharyngectomy with lateral thigh reconstruction and skin grafting on 11/09/2022.  He was discharged from the hospital on 11/20/2022 and presented 2 days later with shortness of breath, heavy mucus production, and hemoptysis.  Was awake and alert but is unable to speak due to previously aforementioned surgeries.  He is accompanied by his wife who provide supplemental history.  He is previously undergone chemotherapy for his cancer, most recently receiving platinum agents and pembrolizumab in September of this year.  In the emergency room as the outside hospital [facility] CT imaging showed an irregular fluid collection in the submandibular space suspicious for phlegmon/abscess.  White count was elevated to 18.6 and the patient was hypocalcemic.  He additionally had marked elevation of his alkaline phosphatase to the 700s as well as new elevations in his bilirubin to 4.8 and AST/ALT most consistent with a cholestatic pattern.  His liver function enzymes continue to worsen and the patient developed new onset jaundice with scleral icterus.  He has no prior history of any liver disease although previous chart review reveals a positive antibody to hepatitis-C.  Initial workup was sent off and Hospital Medicine was consulted for cholestatic hepatitis evaluation.    The patient underwent a abdominal ultrasound which  revealed biliary sludge but no obstructing stone.  Blood cultures taken from admission were positive for Candida glabrata as well as Pseudomonas aeruginosa.  The patient was started on piperacillin/tazobactam for empiric antibiotic coverage and following the discovery of a Candida infectious Disease was consulted.      Overview/Hospital Course:  No notes on file    Interval History: NAEO. Continued to do well. ID consulted for candidemia.    Objective:     Vital Signs (Most Recent):  Temp: 98 °F (36.7 °C) (11/26/22 1102)  Pulse: 82 (11/26/22 1102)  Resp: 18 (11/26/22 1102)  BP: 120/66 (11/26/22 1102)  SpO2: 98 % (11/26/22 1102) Vital Signs (24h Range):  Temp:  [95.1 °F (35.1 °C)-98.1 °F (36.7 °C)] 98 °F (36.7 °C)  Pulse:  [74-88] 82  Resp:  [16-20] 18  SpO2:  [92 %-99 %] 98 %  BP: (112-129)/(57-71) 120/66     Weight: 62.1 kg (137 lb)  Body mass index is 22.11 kg/m².    Intake/Output Summary (Last 24 hours) at 11/26/2022 1321  Last data filed at 11/26/2022 0800  Gross per 24 hour   Intake 3216.65 ml   Output 800 ml   Net 2416.65 ml      Physical Exam  Vitals and nursing note reviewed.   Constitutional:       General: He is not in acute distress.     Appearance: Normal appearance. He is not ill-appearing.      Comments: Trach present.  G-tube present.  Nonpurulent mucus present at tip of tracheostomy.  Jaundiced with scleral icterus.  No abdominal tenderness.  Bowel sounds normal   HENT:      Head: Normocephalic and atraumatic.      Comments:  face swelling  Eyes:      General: Scleral icterus present.      Pupils: Pupils are equal, round, and reactive to light.   Cardiovascular:      Rate and Rhythm: Normal rate and regular rhythm.      Pulses: Normal pulses.      Heart sounds: Normal heart sounds.   Pulmonary:      Effort: Pulmonary effort is normal. No respiratory distress.      Breath sounds: Normal breath sounds.   Abdominal:      General: Abdomen is flat. Bowel sounds are normal. There is no distension.       Palpations: Abdomen is soft.      Tenderness: There is no abdominal tenderness.   Musculoskeletal:      Right lower leg: No edema.      Left lower leg: No edema.   Lymphadenopathy:      Cervical: No cervical adenopathy.   Skin:     General: Skin is warm and dry.      Capillary Refill: Capillary refill takes less than 2 seconds.      Coloration: Skin is jaundiced.   Neurological:      General: No focal deficit present.      Mental Status: He is alert and oriented to person, place, and time.      Cranial Nerves: No cranial nerve deficit.   Psychiatric:         Mood and Affect: Mood normal.         Behavior: Behavior normal.         Thought Content: Thought content normal.         Judgment: Judgment normal.       Significant Labs: All pertinent labs within the past 24 hours have been reviewed.    Significant Imaging: I have reviewed all pertinent imaging results/findings within the past 24 hours.      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  On presentation the patient had marked elevation in alkaline phosphatase, AST/ALT with an are value of 0.49 suggestive of cholestatic pattern of liver injury.  Patient has had multiple hospitalizations with new medications including a course of ampicillin/sulbactam but otherwise no other agents with strong association with this particular pattern of liver injury.  The patient denies any over-the-counter supplements or vitamins.  He denies any pain associated with his abdomen outside of his G-tube.  He denies any changes to his stool color.  He is previously had changes to his tube feeding and was started on trickle feeds over the last 24 hours.  He is not currently on any TPN his  blood cultures did grow Pseudomonas in's Candida glabrata but an ultrasound of the right upper quadrant revealed only biliary sludge without any ductal dilation and the patient is without fever making cholangitis/choledocholithiasis very unlikely.  The etiology of the patient's liver injury is most likely drug related and the patient's synthetic liver function remains intact.  We will continue to monitor the patient's labs and follow-up with the labs ordered today while the patient is in hospital.    Hemolysis labs not c/w hemolysis.   Confirmed hepatobiliary source without evidence of liver failure (d/t normal ammonia and synthetic function)  ESR/CRP elevated, unclear if d/t bacteremia/fungemia or from recent surgery. ID consulted for fungemia.  Anti-smooth muscle, mitochondria pending, KHLOE pending  Immunoglobins WNL  Iron and copper labs not suggestive of hemochromatosis or copper metabolism disorder.  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 Will continue to follow. Most likely DILI in setting of previous hospital visit. Etiology include medications given during operation, antibiotics, or potentially anti-nausea medications.      Plan:  - daily CMP          VTE Risk Mitigation (From admission, onward)         Ordered     enoxaparin injection 40 mg  Daily         11/23/22 1654     IP VTE HIGH RISK PATIENT  Once         11/23/22 1654     Place sequential compression device  Until discontinued         11/23/22 1654                  Wes Gonsales MD  Department of Hospital Medicine   [facility]

## 2022-11-26 NOTE — HPI
73 yo male with advanced SCC of larynx/tongue (maintained on carbo/pem/5FU, last received 8/2022) s/p glossectomy/total pharyngectomy with flap on 11/9 transferred from [facility] for ENT evaluation. ID consulted for abx recs. While at [facility], pt was found to have bacteremia (pseudomonas, CONS) and Cglab fungemia. Trach cx site also with Pseudomonas. CTneck at OSH with irregular fluid collection c/f abscess along submandibular space and above tracheostomy site. Pt is currently on cefepime, vanco, and micafungin. ECHO pending. Pt reported increased drainage from trach site that started recently.

## 2022-11-26 NOTE — SUBJECTIVE & OBJECTIVE
Interval History: NAEO. Continued to do well. ID consulted for candidemia.    Objective:     Vital Signs (Most Recent):  Temp: 98 °F (36.7 °C) (11/26/22 1102)  Pulse: 82 (11/26/22 1102)  Resp: 18 (11/26/22 1102)  BP: 120/66 (11/26/22 1102)  SpO2: 98 % (11/26/22 1102) Vital Signs (24h Range):  Temp:  [95.1 °F (35.1 °C)-98.1 °F (36.7 °C)] 98 °F (36.7 °C)  Pulse:  [74-88] 82  Resp:  [16-20] 18  SpO2:  [92 %-99 %] 98 %  BP: (112-129)/(57-71) 120/66     Weight: 62.1 kg (137 lb)  Body mass index is 22.11 kg/m².    Intake/Output Summary (Last 24 hours) at 11/26/2022 1321  Last data filed at 11/26/2022 0800  Gross per 24 hour   Intake 3216.65 ml   Output 800 ml   Net 2416.65 ml      Physical Exam  Vitals and nursing note reviewed.   Constitutional:       General: He is not in acute distress.     Appearance: Normal appearance. He is not ill-appearing.      Comments: Trach present.  G-tube present.  Nonpurulent mucus present at tip of tracheostomy.  Jaundiced with scleral icterus.  No abdominal tenderness.  Bowel sounds normal   HENT:      Head: Normocephalic and atraumatic.      Comments:  face swelling  Eyes:      General: Scleral icterus present.      Pupils: Pupils are equal, round, and reactive to light.   Cardiovascular:      Rate and Rhythm: Normal rate and regular rhythm.      Pulses: Normal pulses.      Heart sounds: Normal heart sounds.   Pulmonary:      Effort: Pulmonary effort is normal. No respiratory distress.      Breath sounds: Normal breath sounds.   Abdominal:      General: Abdomen is flat. Bowel sounds are normal. There is no distension.      Palpations: Abdomen is soft.      Tenderness: There is no abdominal tenderness.   Musculoskeletal:      Right lower leg: No edema.      Left lower leg: No edema.   Lymphadenopathy:      Cervical: No cervical adenopathy.   Skin:     General: Skin is warm and dry.      Capillary Refill: Capillary refill takes less than 2 seconds.      Coloration: Skin is jaundiced.    Neurological:      General: No focal deficit present.      Mental Status: He is alert and oriented to person, place, and time.      Cranial Nerves: No cranial nerve deficit.   Psychiatric:         Mood and Affect: Mood normal.         Behavior: Behavior normal.         Thought Content: Thought content normal.         Judgment: Judgment normal.       Significant Labs: All pertinent labs within the past 24 hours have been reviewed.    Significant Imaging: I have reviewed all pertinent imaging results/findings within the past 24 hours.

## 2022-11-26 NOTE — PROGRESS NOTES
[facility]  Otorhinolaryngology-Head & Neck Surgery  Progress Note    Subjective:     Post-Op Info:  * No surgery found *      Hospital Day: 4     Interval History: AFVSS. No acute events overnight. States he feels in his usual state of health, mild increased facial edema this AM.     Medications:  Continuous Infusions:   lactated ringers 100 mL/hr at 11/25/22 1247     Scheduled Meds:   calcitrioL  0.25 mcg Per G Tube Daily    calcium carbonate  1,000 mg Per G Tube 5x Daily    enoxaparin  40 mg Subcutaneous Daily    meropenem (MERREM) IVPB  2 g Intravenous Q8H    micafungin (MYCAMINE) IVPB  100 mg Intravenous Q24H    mupirocin   Nasal BID    pantoprazole  40 mg Intravenous Daily    polyethylene glycol  17 g Per G Tube Daily    vancomycin (VANCOCIN) IVPB  15 mg/kg Intravenous Q12H     PRN Meds:acetaminophen, dextrose 10%, dextrose 10%, glucagon (human recombinant), hydrALAZINE, insulin aspart U-100, ondansetron, oxyCODONE, promethazine (PHENERGAN) IVPB, senna-docusate 8.6-50 mg, sodium chloride 0.9%     Review of patient's allergies indicates:   Allergen Reactions    Lovastatin Itching     Objective:     Vital Signs (24h Range):  Temp:  [95.1 °F (35.1 °C)-98.1 °F (36.7 °C)] 98 °F (36.7 °C)  Pulse:  [74-88] 82  Resp:  [16-20] 18  SpO2:  [92 %-99 %] 98 %  BP: (112-129)/(57-71) 120/66     Date 11/26/22 0700 - 11/27/22 0659   Shift 1151-5617 4167-0298 6187-7837 24 Hour Total   INTAKE   NG/   170   Shift Total(mL/kg) 170(2.7)   170(2.7)   OUTPUT   Drains 0   0   Shift Total(mL/kg) 0(0)   0(0)   Weight (kg) 62.1 62.1 62.1 62.1     Lines/Drains/Airways       Central Venous Catheter Line  Duration             Port A Cath Single Lumen left subclavian -- days              Drain  Duration                  Gastrostomy/Enterostomy LUQ -- days              Airway  Duration             Adult Surgical Airway Shiley Cuffed -- days                    Physical Exam  NAD  Awake and alert  Mild facial edema   Trach  "within stoma, removed. Stoma appears healthy  Skin graft to midline neck overlying flap, at left superior portion small area of breakdown though without drainage and remains with adequate coverage  Small area of dehiscence right laterally, near area of STSG, packing removed. Unable to express any drainage  Intraoral flap is soft without fluctuance, secretions thin, clear intraorally    Significant Labs:  CBC:   Recent Labs   Lab 11/25/22  2342   WBC 7.36   RBC 2.39*   HGB 7.1*   HCT 22.2*      MCV 93   MCH 29.7   MCHC 32.0     CMP:   Recent Labs   Lab 11/26/22  0831   *   CALCIUM 7.2*   ALBUMIN 1.8*   PROT 5.2*   *   K 3.3*   CO2 19*      BUN 10   CREATININE 0.7   ALKPHOS 1,261*   *   *   BILITOT 3.6*       Significant Diagnostics:  I have reviewed all pertinent imaging results/findings within the past 24 hours.    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    Patient with elevated WBC and positive blood cultures. Actively vomiting tube feeds during initial evaluation. G-tube decompressed with large amount of tube feeds removed with resolution of vomiting.     Unclear source of infection at this point. Pharyngeal reconstruction defect present at R lateral flap at site of STSG, appears dry without expression of drainage.      ENT/HNS:  - Laryngectomy protocol   -Sign above bed + outside door stating patient is "neck breather" and "can not be intubated by mouth"   -Clean laryngectomy stoma as needed, or daily  - Keep tracheostomy in place     - OK to replace tracheostomy if dislodged, only in place for swelling/secretion assistance   - Humidified O2 via trach collar  - CTM small dehiscence site, appears dry at this time      Neuro:   - Pain: Multimodality PRN regimen initiated     Cardiac:  - HDS  - Page ENT " before starting vasopressors  - H/O of paroxysmal a fib, will CTM      Pulmonary:   - PRN breathing treatments  - Humidified air per trach collar  - OK to remove tracheostomy for adequate suctioning      Renal:   - monitor Is and Os  - Cr stable     Infectious Disease:  - Leukocytosis and BC positive for pseudomonas -- now improved WBC \  - ID consulted at OSH; ID consulted at [facility], appreciate recommendations   - TTE unremarkable    - Request repeat CT neck to assess for surgical site as source    - Request port removal as possible source given fungemia, discussed with patient will call general surgery to discuss poss timing    - Antibiotics per ID team, discussed current POC to escalate anitbiotics  Antibiotics (From admission, onward)    Start     Stop Route Frequency Ordered    11/26/22 1530  meropenem (MERREM) 2 g in sodium chloride 0.9% 100 mL IVPB         -- IV Every 8 hours (non-standard times) 11/26/22 1420    11/26/22 0830  vancomycin in dextrose 5 % 1 gram/250 mL IVPB 1,000 mg         -- IV Every 12 hours (non-standard times) 11/25/22 2053 11/24/22 0900  mupirocin 2 % ointment         11/29 0859 Nasl 2 times daily 11/24/22 0319          - CTM     Hematology/Oncology:  - H/H stable  - Lovenox DVT prophylaxis      FEN/GI  - Transaminitis and increased alk phos   - U/S of abdomen -- biliary sludge, otherwise unremarkable   - ongoing workup per medicine  - Will initiate trickle TFs   - Postoperative hypoparathyroidism   - Hypocalcemia on initial admission     - Continue tums QID, calcitriol   - Replace electrolytes as needed to keep K>4, Mg>2  - Bowel reg  - Protonix for GI prophylaxis, GERD  - STRICT nausea control ondansetron, phenergan       MSK  - Out of bed as tolerated  - STSG donor site healed     Dispo:  - Continue hospital stay, ongoing ID/IM workup for transaminitis and fungemia/septicemia           Ronaldo Hyatt MD  Otorhinolaryngology-Head & Neck Surgery  [facility]

## 2022-11-26 NOTE — SUBJECTIVE & OBJECTIVE
Past Medical History:   Diagnosis Date    Hypothyroidism 11/22/2022    PEG (percutaneous endoscopic gastrostomy) adjustment/replacement/removal 11/22/2022    Type 2 diabetes mellitus, without long-term current use of insulin 11/22/2022       No past surgical history on file.    Review of patient's allergies indicates:   Allergen Reactions    Lovastatin Itching       Medications:  No medications prior to admission.     Antibiotics (From admission, onward)      Start     Stop Route Frequency Ordered    11/26/22 0830  vancomycin in dextrose 5 % 1 gram/250 mL IVPB 1,000 mg         -- IV Every 12 hours (non-standard times) 11/25/22 2053 11/25/22 2000  cefepime in dextrose 5 % IVPB 2 g         -- IV Every 8 hours (non-standard times) 11/25/22 1850 11/24/22 0900  mupirocin 2 % ointment         11/29 0859 Nasl 2 times daily 11/24/22 0319          Antifungals (From admission, onward)      Start     Stop Route Frequency Ordered    11/25/22 2000  micafungin 100 mg in sodium chloride 0.9 % 100 mL IVPB (ready to mix system)         -- IV Every 24 hours (non-standard times) 11/25/22 1850          Antivirals (From admission, onward)      None               There is no immunization history on file for this patient.    Family History    None       Social History     Socioeconomic History    Marital status:      Social Determinants of Health     Financial Resource Strain: Low Risk     Difficulty of Paying Living Expenses: Not very hard   Food Insecurity: Unknown    Worried About Running Out of Food in the Last Year: Never true   Transportation Needs: No Transportation Needs    Lack of Transportation (Medical): No    Lack of Transportation (Non-Medical): No   Physical Activity: Unknown    Days of Exercise per Week: Patient refused    Minutes of Exercise per Session: Patient refused   Stress: Stress Concern Present    Feeling of Stress : To some extent   Social Connections: Unknown    Frequency of Communication with  Friends and Family: Patient refused    Frequency of Social Gatherings with Friends and Family: Patient refused    Attends [employer] Services: Patient refused    Active Member of Clubs or Organizations: Patient refused    Attends Club or Organization Meetings: Patient refused    Marital Status:    Housing Stability: Low Risk     Unable to Pay for Housing in the Last Year: No    Number of Places Lived in the Last Year: 1    Unstable Housing in the Last Year: No     Review of Systems   Constitutional:  Negative for chills and fever.   Eyes:  Positive for visual disturbance.   Skin:  Positive for wound.   All other systems reviewed and are negative.  Objective:     Vital Signs (Most Recent):  Temp: 98 °F (36.7 °C) (11/26/22 0744)  Pulse: 82 (11/26/22 0744)  Resp: 16 (11/26/22 0744)  BP: 129/71 (11/26/22 0744)  SpO2: 99 % (11/26/22 0744) Vital Signs (24h Range):  Temp:  [95 °F (35 °C)-98.1 °F (36.7 °C)] 98 °F (36.7 °C)  Pulse:  [74-88] 82  Resp:  [16-20] 16  SpO2:  [92 %-100 %] 99 %  BP: (112-131)/(57-73) 129/71     Weight: 62.1 kg (137 lb)  Body mass index is 22.11 kg/m².    Estimated Creatinine Clearance: 74.4 mL/min (based on SCr of 0.8 mg/dL).    Physical Exam  Constitutional:       General: He is not in acute distress.     Appearance: He is not ill-appearing, toxic-appearing or diaphoretic.   HENT:      Head: Normocephalic and atraumatic.      Right Ear: External ear normal.      Left Ear: External ear normal.      Mouth/Throat:      Mouth: Mucous membranes are moist.   Eyes:      General: Scleral icterus present.         Right eye: No discharge.         Left eye: No discharge.   Neck:      Comments: trach  Cardiovascular:      Rate and Rhythm: Normal rate and regular rhythm.   Pulmonary:      Effort: Pulmonary effort is normal. No respiratory distress.      Breath sounds: No stridor. No wheezing or rhonchi.   Abdominal:      General: Bowel sounds are normal. There is no distension.      Palpations: Abdomen  is soft.      Tenderness: There is no abdominal tenderness. There is no guarding.      Comments: PEG - no erythema or induration       Musculoskeletal:      Right lower leg: No edema.      Left lower leg: No edema.   Skin:     General: Skin is warm and dry.      Coloration: Skin is not jaundiced.      Findings: No bruising.      Comments: Drainage from R trach - bloody purulent  Port  L leg surgical site -no erythema or induration       Neurological:      Mental Status: He is alert and oriented to person, place, and time.      Motor: No weakness.       Significant Labs:   Microbiology Results (last 7 days)       Procedure Component Value Units Date/Time    Blood culture [[id number]]     Order Status: Sent Specimen: Blood     Blood culture [[id number]]     Order Status: Sent Specimen: Blood             Significant Imaging: I have reviewed all pertinent imaging results/findings within the past 24 hours.

## 2022-11-27 LAB
ALBUMIN SERPL BCP-MCNC: 1.8 G/DL (ref 3.5–5.2)
ALP SERPL-CCNC: 1219 U/L (ref 55–135)
ALT SERPL W/O P-5'-P-CCNC: 121 U/L (ref 10–44)
ANION GAP SERPL CALC-SCNC: 10 MMOL/L (ref 8–16)
AST SERPL-CCNC: 90 U/L (ref 10–40)
BILIRUB SERPL-MCNC: 2.9 MG/DL (ref 0.1–1)
BUN SERPL-MCNC: 7 MG/DL (ref 8–23)
CALCIUM SERPL-MCNC: 6.9 MG/DL (ref 8.7–10.5)
CHLORIDE SERPL-SCNC: 104 MMOL/L (ref 95–110)
CO2 SERPL-SCNC: 21 MMOL/L (ref 23–29)
CREAT SERPL-MCNC: 0.7 MG/DL (ref 0.5–1.4)
EST. GFR  (NO RACE VARIABLE): >60 ML/MIN/1.73 M^2
GLUCOSE SERPL-MCNC: 154 MG/DL (ref 70–110)
POCT GLUCOSE: 130 MG/DL (ref 70–110)
POCT GLUCOSE: 160 MG/DL (ref 70–110)
POCT GLUCOSE: 171 MG/DL (ref 70–110)
POCT GLUCOSE: 175 MG/DL (ref 70–110)
POCT GLUCOSE: 195 MG/DL (ref 70–110)
POTASSIUM SERPL-SCNC: 3.2 MMOL/L (ref 3.5–5.1)
PROT SERPL-MCNC: 5.2 G/DL (ref 6–8.4)
SODIUM SERPL-SCNC: 135 MMOL/L (ref 136–145)
VANCOMYCIN TROUGH SERPL-MCNC: 21.6 UG/ML (ref 10–22)

## 2022-11-27 PROCEDURE — 80202 ASSAY OF VANCOMYCIN: CPT | Performed by: OTOLARYNGOLOGY

## 2022-11-27 PROCEDURE — 20600001 HC STEP DOWN PRIVATE ROOM

## 2022-11-27 PROCEDURE — 27200966 HC CLOSED SUCTION SYSTEM

## 2022-11-27 PROCEDURE — 99900022

## 2022-11-27 PROCEDURE — 63600175 PHARM REV CODE 636 W HCPCS: Performed by: OTOLARYNGOLOGY

## 2022-11-27 PROCEDURE — 25000003 PHARM REV CODE 250: Performed by: OTOLARYNGOLOGY

## 2022-11-27 PROCEDURE — 80053 COMPREHEN METABOLIC PANEL: CPT | Performed by: STUDENT IN AN ORGANIZED HEALTH CARE EDUCATION/TRAINING PROGRAM

## 2022-11-27 PROCEDURE — 99232 SBSQ HOSP IP/OBS MODERATE 35: CPT | Mod: GC,,, | Performed by: STUDENT IN AN ORGANIZED HEALTH CARE EDUCATION/TRAINING PROGRAM

## 2022-11-27 PROCEDURE — 25000003 PHARM REV CODE 250: Performed by: STUDENT IN AN ORGANIZED HEALTH CARE EDUCATION/TRAINING PROGRAM

## 2022-11-27 PROCEDURE — 25500020 PHARM REV CODE 255: Performed by: OTOLARYNGOLOGY

## 2022-11-27 PROCEDURE — 94761 N-INVAS EAR/PLS OXIMETRY MLT: CPT

## 2022-11-27 PROCEDURE — 63600175 PHARM REV CODE 636 W HCPCS

## 2022-11-27 PROCEDURE — 99233 PR SUBSEQUENT HOSPITAL CARE,LEVL III: ICD-10-PCS | Mod: GC,,, | Performed by: STUDENT IN AN ORGANIZED HEALTH CARE EDUCATION/TRAINING PROGRAM

## 2022-11-27 PROCEDURE — 25000003 PHARM REV CODE 250

## 2022-11-27 PROCEDURE — 63600175 PHARM REV CODE 636 W HCPCS: Performed by: STUDENT IN AN ORGANIZED HEALTH CARE EDUCATION/TRAINING PROGRAM

## 2022-11-27 PROCEDURE — C9113 INJ PANTOPRAZOLE SODIUM, VIA: HCPCS | Performed by: STUDENT IN AN ORGANIZED HEALTH CARE EDUCATION/TRAINING PROGRAM

## 2022-11-27 PROCEDURE — 63700000 PHARM REV CODE 250 ALT 637 W/O HCPCS: Performed by: STUDENT IN AN ORGANIZED HEALTH CARE EDUCATION/TRAINING PROGRAM

## 2022-11-27 PROCEDURE — 25000242 PHARM REV CODE 250 ALT 637 W/ HCPCS: Performed by: STUDENT IN AN ORGANIZED HEALTH CARE EDUCATION/TRAINING PROGRAM

## 2022-11-27 PROCEDURE — 99900035 HC TECH TIME PER 15 MIN (STAT)

## 2022-11-27 PROCEDURE — 36415 COLL VENOUS BLD VENIPUNCTURE: CPT | Performed by: STUDENT IN AN ORGANIZED HEALTH CARE EDUCATION/TRAINING PROGRAM

## 2022-11-27 PROCEDURE — 36415 COLL VENOUS BLD VENIPUNCTURE: CPT | Performed by: OTOLARYNGOLOGY

## 2022-11-27 PROCEDURE — 99232 PR SUBSEQUENT HOSPITAL CARE,LEVL II: ICD-10-PCS | Mod: GC,,, | Performed by: STUDENT IN AN ORGANIZED HEALTH CARE EDUCATION/TRAINING PROGRAM

## 2022-11-27 PROCEDURE — 63600175 PHARM REV CODE 636 W HCPCS: Mod: JG | Performed by: STUDENT IN AN ORGANIZED HEALTH CARE EDUCATION/TRAINING PROGRAM

## 2022-11-27 PROCEDURE — 99233 SBSQ HOSP IP/OBS HIGH 50: CPT | Mod: GC,,, | Performed by: STUDENT IN AN ORGANIZED HEALTH CARE EDUCATION/TRAINING PROGRAM

## 2022-11-27 RX ORDER — LIDOCAINE HYDROCHLORIDE 10 MG/ML
20 INJECTION, SOLUTION EPIDURAL; INFILTRATION; INTRACAUDAL; PERINEURAL ONCE
Status: COMPLETED | OUTPATIENT
Start: 2022-11-27 | End: 2022-11-27

## 2022-11-27 RX ADMIN — CALCITRIOL 0.25 MCG: 1 SOLUTION ORAL at 09:11

## 2022-11-27 RX ADMIN — CALCIUM CARBONATE (ANTACID) CHEW TAB 500 MG 1000 MG: 500 CHEW TAB at 10:11

## 2022-11-27 RX ADMIN — PANTOPRAZOLE SODIUM 40 MG: 40 INJECTION, POWDER, FOR SOLUTION INTRAVENOUS at 09:11

## 2022-11-27 RX ADMIN — MUPIROCIN: 20 OINTMENT TOPICAL at 09:11

## 2022-11-27 RX ADMIN — INSULIN ASPART 2 UNITS: 100 INJECTION, SOLUTION INTRAVENOUS; SUBCUTANEOUS at 06:11

## 2022-11-27 RX ADMIN — VANCOMYCIN HYDROCHLORIDE 750 MG: 750 INJECTION, POWDER, LYOPHILIZED, FOR SOLUTION INTRAVENOUS at 10:11

## 2022-11-27 RX ADMIN — MICAFUNGIN SODIUM 100 MG: 100 INJECTION, POWDER, LYOPHILIZED, FOR SOLUTION INTRAVENOUS at 08:11

## 2022-11-27 RX ADMIN — OXYCODONE HYDROCHLORIDE 5 MG: 5 SOLUTION ORAL at 08:11

## 2022-11-27 RX ADMIN — LIDOCAINE HYDROCHLORIDE 200 MG: 10 INJECTION, SOLUTION EPIDURAL; INFILTRATION; INTRACAUDAL; PERINEURAL at 02:11

## 2022-11-27 RX ADMIN — POTASSIUM BICARBONATE 50 MEQ: 978 TABLET, EFFERVESCENT ORAL at 10:11

## 2022-11-27 RX ADMIN — MEROPENEM 2 G: 1 INJECTION INTRAVENOUS at 07:11

## 2022-11-27 RX ADMIN — CALCIUM CARBONATE (ANTACID) CHEW TAB 500 MG 1000 MG: 500 CHEW TAB at 06:11

## 2022-11-27 RX ADMIN — POLYETHYLENE GLYCOL 3350 17 G: 17 POWDER, FOR SOLUTION ORAL at 09:11

## 2022-11-27 RX ADMIN — CALCIUM CARBONATE (ANTACID) CHEW TAB 500 MG 1000 MG: 500 CHEW TAB at 02:11

## 2022-11-27 RX ADMIN — SODIUM CHLORIDE, POTASSIUM CHLORIDE, SODIUM LACTATE AND CALCIUM CHLORIDE: 600; 310; 30; 20 INJECTION, SOLUTION INTRAVENOUS at 03:11

## 2022-11-27 RX ADMIN — VANCOMYCIN HYDROCHLORIDE 1000 MG: 1 INJECTION, POWDER, LYOPHILIZED, FOR SOLUTION INTRAVENOUS at 11:11

## 2022-11-27 RX ADMIN — IOHEXOL 75 ML: 350 INJECTION, SOLUTION INTRAVENOUS at 06:11

## 2022-11-27 RX ADMIN — MEROPENEM 2 G: 1 INJECTION INTRAVENOUS at 02:11

## 2022-11-27 RX ADMIN — MUPIROCIN: 20 OINTMENT TOPICAL at 08:11

## 2022-11-27 RX ADMIN — CALCIUM CARBONATE (ANTACID) CHEW TAB 500 MG 1000 MG: 500 CHEW TAB at 08:11

## 2022-11-27 RX ADMIN — ENOXAPARIN SODIUM 40 MG: 40 INJECTION SUBCUTANEOUS at 05:11

## 2022-11-27 RX ADMIN — MEROPENEM 2 G: 1 INJECTION INTRAVENOUS at 11:11

## 2022-11-27 NOTE — PROGRESS NOTES
[facility]  Infectious Disease  Progress Note    Patient Name: Cyrus Batres Jr.  MRN: [id number]  Admission Date: 11/23/2022  Length of Stay: 4 days  Attending Physician: Matheus Levy MD  Primary Care Provider: Maico Patterosn MD    Isolation Status: No active isolations  Assessment/Plan:      Bacteremia  See fungemia    Fungemia  72 year old male with a history of glottic squamous cell carcinoma (chemo w/ platinum agents and pembrolizumab as of 9/2022) underwent total glossectomy, pharyngectomy, and lateral thigh reconstruction and split thickness skin grafting on 11/9. Presented this admission for worsening SOB,  Found to have bacteremia with staph epidermidis and pseudomonas (found on both blood and tracheal aspirate culture), as well as fungemia with Candida glabrata isolated from OSH - suspect from fluid collection seen on CT from OSH. Also noted to have Pseudomonas ib blood cultures and tracheal aspirate cultures. TTE with no vegetations. Still needs an eye exam by ophthalmology.     Recommendations:    1. Repeat CT neck with contrast if possible, concern for abscess given organisms isolated from cultures.     2. Please remove Port in the setting of fungemia as well as staphylococcus epidermidis detected on Blood PCR panel. Consult ophthalmology on Monday 11/28  to obtain eye exam in the setting of fungemia.     3. Repeat Blood cultures from 11/26 show no growth so far, will cont. To follow.     4. Continue Vancomycin (staph epidermidis), Micafungin (C. Glabrata) and meropenem ( treating psuedomonas in blood which has a high cefepime BENI and avoiding pip/tazo in the setting of Vancomycin use and upcoming contrast administration to prevent further renal injury),                 Anticipated Disposition: TBD    Thank you for your consult. I will follow-up with patient. Please contact us if you have any additional questions.    Emanule De La Fuente MD  Infectious Disease  [facility]    Subjective:      Principal Problem:<principal problem not specified>    HPI: 73 yo male with advanced SCC of larynx/tongue (maintained on carbo/pem/5FU, last received 8/2022) s/p glossectomy/total pharyngectomy with flap on 11/9 transferred from [facility] for ENT evaluation. ID consulted for abx recs. While at [facility], pt was found to have bacteremia (pseudomonas, CONS) and Cglab fungemia. Trach cx site also with Pseudomonas. CTneck at OSH with irregular fluid collection c/f abscess along submandibular space and above tracheostomy site. Pt is currently on cefepime, vanco, and micafungin. ECHO pending. Pt reported increased drainage from trach site that started recently.     Interval History: no adverse events, remains afebrile.     Review of Systems   Constitutional:  Negative for fatigue.   Respiratory:  Negative for chest tightness and shortness of breath.    Gastrointestinal:  Negative for abdominal pain, blood in stool, nausea and vomiting.   Musculoskeletal:  Negative for myalgias.   Skin:  Positive for wound.   Psychiatric/Behavioral:  Negative for confusion.      Objective:     Vital Signs (Most Recent):  Temp: 98 °F (36.7 °C) (11/27/22 1124)  Pulse: 75 (11/27/22 1124)  Resp: 18 (11/27/22 1124)  BP: 112/60 (11/27/22 1124)  SpO2: (!) 94 % (11/27/22 1124)   Vital Signs (24h Range):  Temp:  [97 °F (36.1 °C)-98 °F (36.7 °C)] 98 °F (36.7 °C)  Pulse:  [75-87] 75  Resp:  [16-20] 18  SpO2:  [94 %-100 %] 94 %  BP: (112-129)/(60-73) 112/60     Weight: 62.1 kg (137 lb)  Body mass index is 22.11 kg/m².    Estimated Creatinine Clearance: 85 mL/min (based on SCr of 0.7 mg/dL).    Physical Exam  Constitutional:       Appearance: He is ill-appearing. He is not toxic-appearing or diaphoretic.   HENT:      Head: Normocephalic and atraumatic.      Comments: Trach collar in place, healthy pink stoma  Post op changes,        Nose: Nose normal.      Mouth/Throat:      Mouth: Mucous membranes are moist.      Pharynx: Oropharynx is clear.      Comments:  Dental caries  S/p glossectomy   Eyes:      Extraocular Movements: Extraocular movements intact.      Conjunctiva/sclera: Conjunctivae normal.      Pupils: Pupils are equal, round, and reactive to light.   Cardiovascular:      Rate and Rhythm: Normal rate and regular rhythm.      Pulses: Normal pulses.      Heart sounds: Normal heart sounds.   Pulmonary:      Effort: Pulmonary effort is normal.      Breath sounds: Normal breath sounds.   Abdominal:      General: Abdomen is flat. Bowel sounds are normal.      Palpations: Abdomen is soft.      Tenderness: There is no guarding or rebound.      Comments: PEG site c/d/I, no erythema or purulence   Musculoskeletal:         General: No swelling or deformity.      Cervical back: Normal range of motion and neck supple.   Skin:     General: Skin is warm and dry.      Coloration: Skin is not jaundiced.      Findings: No rash.      Comments: Skin around trach stoma site with small area of dehiscence with scant drainage, serous    Neurological:      Mental Status: He is alert. Mental status is at baseline.      Comments: Communicates non verbally, alert, awake   Psychiatric:         Behavior: Behavior normal.       Significant Labs: Blood Culture:   Recent Labs   Lab 11/22/22  0557 11/23/22  0943 11/26/22  0831 11/26/22  0832   LABBLOO Gram stain aer bottle: Gram negative rods  Results called to and read back by:KRISTA Merchant;  11/23/2022  09:15 CJD  Gram stain lul bottle: yeast and  Gram negative rods  Results called to and read back by: Dr. Fofana  11/23/2022  23:24 KS3  PSEUDOMONAS AERUGINOSA  For susceptibility see order #[id number]  *  OSCAR GLABRATA*  Gram stain aer bottle:  Gram negative rods  Results called to and read back by: KRISTA Merchant;  11/23/2022  07:20 CJD  PSEUDOMONAS AERUGINOSA* Gram stain aer bottle: Gram positive cocci and Gram Negative rods  Results called to and read back by:Dr. Camp;  11/24/2022  15:17 CJD  PSEUDOMONAS  AERUGINOSA  For susceptibility see order #[id number]  *  COAGULASE-NEGATIVE STAPHYLOCOCCUS SPECIES  Organism is a probable contaminant  * No Growth to date No Growth to date     BMP:   Recent Labs   Lab 11/27/22 0627   *   *   K 3.2*      CO2 21*   BUN 7*   CREATININE 0.7   CALCIUM 6.9*     CBC:   Recent Labs   Lab 11/25/22  2342   WBC 7.36   HGB 7.1*   HCT 22.2*        CMP:   Recent Labs   Lab 11/25/22  1430 11/26/22  0831 11/27/22  0627   * 134* 135*   K 3.4* 3.3* 3.2*   CL 99 104 104   CO2 19* 19* 21*   GLU 70 128* 154*   BUN 12 10 7*   CREATININE 0.8 0.7 0.7   CALCIUM 7.8* 7.2* 6.9*   PROT 5.6* 5.2* 5.2*   ALBUMIN 2.1* 1.8* 1.8*   BILITOT 4.4* 3.6* 2.9*   ALKPHOS 1,355* 1,261* 1,219*   AST 93* 100* 90*   * 121* 121*   ANIONGAP 12 11 10     Microbiology Results (last 7 days)       Procedure Component Value Units Date/Time    Blood culture [[id number]] Collected: 11/26/22 0831    Order Status: Completed Specimen: Blood from Peripheral, Right Hand Updated: 11/26/22 1715     Blood Culture, Routine No Growth to date    Blood culture [[id number]] Collected: 11/26/22 0832    Order Status: Completed Specimen: Blood from Peripheral, Left Hand Updated: 11/26/22 1715     Blood Culture, Routine No Growth to date          Pathology Results  (Last 10 years)      None          Recent Lab Results  (Last 5 results in the past 24 hours)        11/27/22  1123   11/27/22  0758   11/27/22  0627   11/27/22  0603   11/27/22  0003        Albumin     1.8           Alkaline Phosphatase     1,219           ALT     121           Anion Gap     10           AST     90           BILIRUBIN TOTAL     2.9  Comment: For infants and newborns, interpretation of results should be based  on gestational age, weight and in agreement with clinical  observations.    Premature Infant recommended reference ranges:  Up to 24 hours.............<8.0 mg/dL  Up to 48 hours............<12.0 mg/dL  3-5  days..................<15.0 mg/dL  6-29 days.................<15.0 mg/dL             BUN     7           Calcium     6.9  Comment: *Critical value notification by KATHY with confirmation of receipt to   Noah Venegas RN at  Date 11/27/22 Time 07:44             Chloride     104           CO2     21           Creatinine     0.7           eGFR     >60.0           Glucose     154           POCT Glucose 160       171   195       Potassium     3.2           PROTEIN TOTAL     5.2           Sodium     135           Vancomycin-Trough   21.6                                    Significant Imaging: I have reviewed all pertinent imaging results/findings within the past 24 hours.

## 2022-11-27 NOTE — SUBJECTIVE & OBJECTIVE
Interval History: NAEO. Continued to do well. Labs continue to improve. Patient jaundice improving and no symptoms at this time.    Objective:     Vital Signs (Most Recent):  Temp: 98 °F (36.7 °C) (11/27/22 1124)  Pulse: 75 (11/27/22 1124)  Resp: 18 (11/27/22 1124)  BP: 112/60 (11/27/22 1124)  SpO2: (!) 94 % (11/27/22 1124) Vital Signs (24h Range):  Temp:  [97 °F (36.1 °C)-98 °F (36.7 °C)] 98 °F (36.7 °C)  Pulse:  [75-87] 75  Resp:  [16-20] 18  SpO2:  [94 %-100 %] 94 %  BP: (112-129)/(60-73) 112/60     Weight: 62.1 kg (137 lb)  Body mass index is 22.11 kg/m².    Intake/Output Summary (Last 24 hours) at 11/27/2022 1337  Last data filed at 11/27/2022 0450  Gross per 24 hour   Intake 3354.85 ml   Output 2150 ml   Net 1204.85 ml        Physical Exam  Vitals and nursing note reviewed.   Constitutional:       General: He is not in acute distress.     Appearance: Normal appearance. He is not ill-appearing.      Comments: Trach present.  G-tube present.  Nonpurulent mucus present at tip of tracheostomy.  Jaundiced with scleral icterus.  No abdominal tenderness.  Bowel sounds normal   HENT:      Head: Normocephalic and atraumatic.      Comments:  face swelling  Eyes:      General: Scleral icterus present.      Pupils: Pupils are equal, round, and reactive to light.   Cardiovascular:      Rate and Rhythm: Normal rate and regular rhythm.      Pulses: Normal pulses.      Heart sounds: Normal heart sounds.   Pulmonary:      Effort: Pulmonary effort is normal. No respiratory distress.      Breath sounds: Normal breath sounds.   Abdominal:      General: Abdomen is flat. Bowel sounds are normal. There is no distension.      Palpations: Abdomen is soft.      Tenderness: There is no abdominal tenderness.   Musculoskeletal:      Right lower leg: No edema.      Left lower leg: No edema.   Lymphadenopathy:      Cervical: No cervical adenopathy.   Skin:     General: Skin is warm and dry.      Capillary Refill: Capillary refill takes less  than 2 seconds.      Coloration: Skin is jaundiced.   Neurological:      General: No focal deficit present.      Mental Status: He is alert and oriented to person, place, and time.      Cranial Nerves: No cranial nerve deficit.   Psychiatric:         Mood and Affect: Mood normal.         Behavior: Behavior normal.         Thought Content: Thought content normal.         Judgment: Judgment normal.       Significant Labs: All pertinent labs within the past 24 hours have been reviewed.    Significant Imaging: I have reviewed all pertinent imaging results/findings within the past 24 hours.

## 2022-11-27 NOTE — HOSPITAL COURSE
Patient LFT improving. 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

## 2022-11-27 NOTE — CONSULTS
Pharmacokinetic Assessment Follow Up: IV Vancomycin    Vancomycin serum concentration assessment(s):    The trough level was drawn 3 hours early and can be used to guide therapy at this time. The measurement is above the desired definitive target range of 10 to 15 mcg/mL per ID.  Stable renal function, SCr = 0.7 mg/dL    Vancomycin Regimen Plan:    Change regimen to Vancomycin 750 mg IV every 12 hours with next serum trough concentration measured at 1000 prior to 4th dose on 11/29/22    Drug levels (last 3 results):  Recent Labs   Lab Result Units 11/27/22  0758   Vancomycin-Trough ug/mL 21.6       Pharmacy will continue to follow and monitor vancomycin.    Please contact pharmacy at extension [phone] for questions regarding this assessment.    Thank you for the consult,   Feli Jeter, PharmD       Patient brief summary:  Cyrus Batres Jr. is a 72 y.o. male initiated on antimicrobial therapy with IV Vancomycin for treatment of sepsis    Drug Allergies:   Review of patient's allergies indicates:   Allergen Reactions    Lovastatin Itching       Actual Body Weight:   62.1 kg    Renal Function:   Estimated Creatinine Clearance: 85 mL/min (based on SCr of 0.7 mg/dL).,     Dialysis Method (if applicable):  N/A    CBC (last 72 hours):  Recent Labs   Lab Result Units 11/25/22  2342   WBC K/uL 7.36   Hemoglobin g/dL 7.1*   Hematocrit % 22.2*   Platelets K/uL 314   Gran % % 84.7*   Lymph % % 6.1*   Mono % % 5.3   Eosinophil % % 1.1   Basophil % % 0.1   Differential Method  Automated       Metabolic Panel (last 72 hours):  Recent Labs   Lab Result Units 11/25/22  1430 11/26/22  0831 11/27/22  0627   Sodium mmol/L 130* 134* 135*   Potassium mmol/L 3.4* 3.3* 3.2*   Chloride mmol/L 99 104 104   CO2 mmol/L 19* 19* 21*   Glucose mg/dL 70 128* 154*   BUN mg/dL 12 10 7*   Creatinine mg/dL 0.8 0.7 0.7   Albumin g/dL 2.1* 1.8* 1.8*   Total Bilirubin mg/dL 4.4* 3.6* 2.9*   Alkaline Phosphatase U/L 1,355* 1,261* 1,219*   AST U/L 93* 100* 90*    ALT U/L 131* 121* 121*       Vancomycin Administrations:  vancomycin given in the last 96 hours                     vancomycin in dextrose 5 % 1 gram/250 mL IVPB 1,000 mg (mg) 1,000 mg New Bag 11/27/22 1108     1,000 mg New Bag 11/26/22 2300     1,000 mg New Bag  0859    vancomycin 1.5 g in dextrose 5 % 250 mL IVPB (ready to mix) (mg) 1,500 mg New Bag 11/25/22 2049                    Microbiologic Results:  Microbiology Results (last 7 days)       Procedure Component Value Units Date/Time    Blood culture [[id number]] Collected: 11/26/22 0831    Order Status: Completed Specimen: Blood from Peripheral, Right Hand Updated: 11/26/22 1715     Blood Culture, Routine No Growth to date    Blood culture [[id number]] Collected: 11/26/22 0832    Order Status: Completed Specimen: Blood from Peripheral, Left Hand Updated: 11/26/22 1715     Blood Culture, Routine No Growth to date

## 2022-11-27 NOTE — ASSESSMENT & PLAN NOTE
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  On presentation the patient had marked elevation in alkaline phosphatase, AST/ALT with an are value of 0.49 suggestive of cholestatic pattern of liver injury.  Patient has had multiple hospitalizations with new medications including a course of ampicillin/sulbactam but otherwise no other agents with strong association with this particular pattern of liver injury.  The patient denies any over-the-counter supplements or vitamins.  He denies any pain associated with his abdomen outside of his G-tube.  He denies any changes to his stool color.  He is previously had changes to his tube feeding and was started on trickle feeds over the last 24 hours.  He is not currently on any TPN his blood cultures did grow Pseudomonas in's Candida glabrata but an ultrasound of the right upper quadrant revealed only biliary sludge without any ductal dilation and the patient is without fever making cholangitis/choledocholithiasis very unlikely.  The etiology of the patient's liver injury is most likely drug related and the patient's synthetic liver function remains intact.  We will continue to monitor the patient's labs and follow-up with the labs ordered today while the patient is in hospital.    Hemolysis labs not c/w hemolysis.   Confirmed hepatobiliary source without evidence of liver failure (d/t normal ammonia and synthetic function)  ESR/CRP elevated, unclear if d/t bacteremia/fungemia or from recent surgery. ID consulted for fungemia.  Immunoglobins WNL  Iron and copper labs not suggestive of hemochromatosis or copper metabolism disorder.  TTE unremarkable  Liver US unremarkable.        Labs marginally improved today. Will sign off  at this time. Most likely DILI in setting of previous hospital visit. Suspect due to ampicillin/sulbactam during prior admission.       Recommendations:  - daily CMP  - Hep A, B, & C with hep C RNA pending  - recommend f/u by primary if Anti-smooth muscle, mitochondria pending, KHLOE are abnormal

## 2022-11-27 NOTE — ASSESSMENT & PLAN NOTE
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 Presented this admission for worsening SOB,  Found to have bacteremia with staph epidermidis and pseudomonas (found on both blood and tracheal aspirate culture), as well as fungemia with Candida glabrata isolated from OSH - suspect from fluid collection seen on CT from OSH. Also noted to have Pseudomonas ib blood cultures and tracheal aspirate cultures. TTE with no vegetations. Still needs an eye exam by ophthalmology.     Recommendations:    1. Repeat CT neck with contrast if possible, concern for abscess given organisms isolated from cultures.     2. Please remove Port in the setting of fungemia as well as staphylococcus epidermidis detected on Blood PCR panel. Consult ophthalmology on Monday 11/28  to obtain eye exam in the setting of fungemia.     3. Repeat Blood cultures from 11/26 show no growth so far, will cont. To follow.     4. Continue Vancomycin (staph epidermidis), Micafungin (C. Glabrata) and meropenem ( treating psuedomonas in blood which has a high cefepime BENI and avoiding pip/tazo in the setting of Vancomycin use and upcoming contrast administration to prevent further renal injury),

## 2022-11-27 NOTE — RESPIRATORY THERAPY
RAPID RESPONSE RESPIRATORY THERAPY PROACTIVE NOTE           Time of visit: 821     Code Status: Full Code   : 1950  Bed: 1055/1055 A:   MRN: [id number]  Time spent at the bedside: < 15 min    SITUATION    Evaluated patient for: LDA Check     BACKGROUND    Patient has a past medical history of Hypothyroidism, PEG (percutaneous endoscopic gastrostomy) adjustment/replacement/removal, and Type 2 diabetes mellitus, without long-term current use of insulin.  Clinically Significant Surgical Hx: laryngectomy    24 Hours Vitals Range:  Temp:  [97 °F (36.1 °C)-98 °F (36.7 °C)]   Pulse:  [79-87]   Resp:  [16-20]   BP: (119-129)/(66-73)   SpO2:  [95 %-100 %]     Labs:    Recent Labs     22  1430 22  0831 22  0627   * 134* 135*   K 3.4* 3.3* 3.2*   CL 99 104 104   CO2 19* 19* 21*   CREATININE 0.8 0.7 0.7   GLU 70 128* 154*        No results for input(s): PH, PCO2, PO2, HCO3, POCSATURATED, BE in the last 72 hours.    ASSESSMENT/INTERVENTIONS  Patient resting comfortably, no respiratory concerns at this time. All supplies visible at bedside.      Last VS   Temp: 97.3 °F (36.3 °C) (441)  Pulse: 80 (733)  Resp: 18 (733)  BP: 122/68 (733)  SpO2: 100 % (733)      Extra trachs at bedside: 4 & 6 cuffed trachs and 6, 7, 8 ETT's  Level of Consciousness: Level of Consciousness (AVPU): alert  Respiratory Effort: Respiratory Effort: Normal, Unlabored Expansion/Accessory Muscle Usage: Expansion/Accessory Muscles/Retractions: expansion symmetric, no retractions, no use of accessory muscles  All Lung Field Breath Sounds: All Lung Fields Breath Sounds: clear, Anterior:, Lateral:  O2 Device/Concentration: room air  Surgical airway: 6 cuffed trach in Laryngectomy Stoma  Ambu at bedside: Ambu bag with the patient?: Yes, Adult Ambu     Active Orders   Respiratory Care    Oxygen Continuous     Frequency: Continuous     Number of Occurrences: Until Specified     Order Questions:       Device type: Low flow      Device: Trach Collar      FiO2%: 28      Titrate O2 per Oxygen Titration Protocol: Yes      To maintain SpO2 goal of: >= 90%      Notify MD of: Inability to achieve desired SpO2; Sudden change in patient status and requires 20% increase in FiO2; Patient requires >60% FiO2    Routine tracheostomy care     Frequency: Q12H     Number of Occurrences: Until Specified       RECOMMENDATIONS    We recommend: RRT Recs: Continue POC per primary team.      FOLLOW-UP    Please call back the Rapid Response RT, Yumiko Velásquez RRT at x [phone] for any questions or concerns.

## 2022-11-27 NOTE — PROGRESS NOTES
"[facility] - [facility]  Otorhinolaryngology-Head & Neck Surgery  Progress Note    Subjective:     Post-Op Info:  * No surgery found *      Hospital Day: 5     No new subjective & objective note has been filed under this hospital service since the last note was generated.  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    Patient with elevated WBC and positive blood cultures. Actively vomiting tube feeds during initial evaluation. G-tube decompressed with large amount of tube feeds removed with resolution of vomiting.     Unclear source of infection at this point. Pharyngeal reconstruction defect present at R lateral flap at site of STSG, appears dry without expression of drainage.      ENT/HNS:  - Laryngectomy protocol   -Sign above bed + outside door stating patient is "neck breather" and "can not be intubated by mouth"   -Clean laryngectomy stoma as needed, or daily  - Keep tracheostomy in place     - OK to replace tracheostomy if dislodged, only in place for swelling/secretion assistance -- replaced 11/27  - Humidified O2 via trach collar  - CTM small dehiscence site, appears dry at this time      Neuro:   - Pain: Multimodality PRN regimen initiated     Cardiac:  - HDS  - Page ENT before starting vasopressors  - H/O of paroxysmal a fib, will CTM      Pulmonary:   - PRN breathing treatments  - Humidified air per trach collar  - OK to remove tracheostomy for adequate suctioning -- increased sputum production      Renal:   - monitor Is and Os  - Cr stable     Infectious Disease:  - Leukocytosis and BC positive for pseudomonas -- now improved WBC \  - ID consulted at OSH; ID consulted at [facility], appreciate recommendations   - TTE unremarkable    - Request repeat CT neck to assess for surgical site as source    - Request port removal as possible source given " fungemia/bacteremia with repeat blood cultures x2 once removed, discussed with patient will call general surgery once midline access achieved  - Midline team consulted   - Antibiotics per ID team, discussed current POC to escalate anitbiotics  Antibiotics (From admission, onward)      Start     Stop Route Frequency Ordered    11/27/22 2300  vancomycin 750 mg in dextrose 5 % 250 mL IVPB (ready to mix system)         -- IV Every 12 hours (non-standard times) 11/27/22 1139    11/26/22 1530  meropenem (MERREM) 2 g in sodium chloride 0.9% 100 mL IVPB         -- IV Every 8 hours (non-standard times) 11/26/22 1420    11/24/22 0900  mupirocin 2 % ointment         11/29 0859 Nasl 2 times daily 11/24/22 0319            - CTM     Hematology/Oncology:  - H/H stable  - Lovenox DVT prophylaxis      FEN/GI  - Transaminitis and increased alk phos   - U/S of abdomen + liver dopppler -- biliary sludge, otherwise unremarkable; doppler wnl   - ongoing workup per medicine ---- follow-up autoimmune workup     - CRP, ESR elevated     - pending antibodies -- KHLOE, ANCA, ASMA, etc.   - Will initiate trickle TFs   - Postoperative hypoparathyroidism   - Hypocalcemia on initial admission     - Continue tums QID, calcitriol   - Replace electrolytes as needed to keep K>4, Mg>2 --repleted K+ 11/27  - Bowel reg  - Protonix for GI prophylaxis, GERD  - STRICT nausea control ondansetron, phenergan       MSK  - Out of bed as tolerated  - STSG donor site healed     Dispo:  - Continue hospital stay, ongoing ID/IM workup for transaminitis and fungemia/septicemia         Ronaldo Hyatt MD  Otorhinolaryngology-Head & Neck Surgery  [facility]

## 2022-11-27 NOTE — ASSESSMENT & PLAN NOTE
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    Currently with bacteremia and fungemia, unclear source at this point. Repeat CT AM pending but appears to have fluid collection.      ENT/HNS:  - Laryngectomy protocol   -Sign above bed + outside door stating patient is "neck breather" and "can not be intubated by mouth"   -Clean laryngectomy stoma as needed, or daily  - Keep tracheostomy in place     - OK to replace tracheostomy if dislodged, only in place for swelling/secretion assistance -- replaced 11/27  - Humidified O2 via trach collar  - Will follow up official CT read      Neuro:   - Pain: Multimodality PRN regimen initiated     Cardiac:  - HDS  - Page ENT before starting vasopressors  - H/O of paroxysmal a fib, will CTM      Pulmonary:   - PRN breathing treatments  - Humidified air per trach collar  - OK to remove tracheostomy for adequate suctioning     Renal:   - monitor Is and Os  - Cr stable     Infectious Disease:  - Leukocytosis and BC positive for pseudomonas -- now improved WBC \  - ID consulted; appreciate recommendations   - TTE unremarkable    - CT neck read pending but appears to have fluid collection   - Request port removal as possible source given fungemia/bacteremia with repeat blood cultures x2 once removed, discussed with patient will call general surgery once midline access achieved  - Midline team consulted   - Antibiotics per ID team, discussed current POC to escalate anitbiotics  Antibiotics (From admission, onward)    Start     Stop Route Frequency Ordered    11/27/22 2300  vancomycin 750 mg in dextrose 5 % 250 mL IVPB (ready to mix system)         -- IV Every 12 hours (non-standard times) 11/27/22 1139    11/26/22 1530  meropenem (MERREM) 2 g in sodium chloride 0.9% 100 mL IVPB         -- IV Every 8 hours (non-standard times) 11/26/22 1420    11/24/22 0900  " mupirocin 2 % ointment         11/29 0859 Nasl 2 times daily 11/24/22 0319           Hematology/Oncology:  - H/H stable  - Lovenox DVT prophylaxis      FEN/GI  - Transaminitis and increased alk phos   - U/S of abdomen + liver dopppler     -- biliary sludge, otherwise unremarkable; doppler wnl   - Medicine consulted; appreciate recs     - CRP, ESR elevated     - Autoimmune workup negative      - Hepatitis screen pending   - Continue trickle TFs   - Postoperative hypoparathyroidism   - Hypocalcemia on initial admission     - Continue tums QID, calcitriol   - Replace electrolytes as needed to keep K>4, Mg>2 --repleted K+ 11/27  - Bowel reg  - Protonix for GI prophylaxis, GERD  - STRICT nausea control ondansetron, phenergan       MSK  - Out of bed as tolerated  - STSG donor site healed     Dispo:  - Continue hospital stay, ongoing ID/IM workup for transaminitis and fungemia/septicemia

## 2022-11-27 NOTE — ASSESSMENT & PLAN NOTE
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    Patient with elevated WBC and positive blood cultures. Actively vomiting tube feeds during initial evaluation. G-tube decompressed with large amount of tube feeds removed with resolution of vomiting.     Unclear source of infection at this point. Pharyngeal reconstruction defect present at R lateral flap at site of STSG, appears dry without expression of drainage.      ENT/HNS:  - Laryngectomy protocol   -Sign above bed + outside door stating patient is "neck breather" and "can not be intubated by mouth"   -Clean laryngectomy stoma as needed, or daily  - Keep tracheostomy in place     - OK to replace tracheostomy if dislodged, only in place for swelling/secretion assistance -- replaced 11/27  - Humidified O2 via trach collar  - CTM small dehiscence site, appears dry at this time      Neuro:   - Pain: Multimodality PRN regimen initiated     Cardiac:  - HDS  - Page ENT before starting vasopressors  - H/O of paroxysmal a fib, will CTM      Pulmonary:   - PRN breathing treatments  - Humidified air per trach collar  - OK to remove tracheostomy for adequate suctioning -- increased sputum production      Renal:   - monitor Is and Os  - Cr stable     Infectious Disease:  - Leukocytosis and BC positive for pseudomonas -- now improved WBC \  - ID consulted at OSH; ID consulted at [facility], appreciate recommendations   - TTE unremarkable    - Request repeat CT neck to assess for surgical site as source    - Request port removal as possible source given fungemia/bacteremia with repeat blood cultures x2 once removed, discussed with patient will call general surgery once midline access achieved  - Midline team consulted   - Antibiotics per ID team, discussed current POC to escalate anitbiotics  Antibiotics (From admission, onward)    Start     Stop Route " Frequency Ordered    11/27/22 2300  vancomycin 750 mg in dextrose 5 % 250 mL IVPB (ready to mix system)         -- IV Every 12 hours (non-standard times) 11/27/22 1139    11/26/22 1530  meropenem (MERREM) 2 g in sodium chloride 0.9% 100 mL IVPB         -- IV Every 8 hours (non-standard times) 11/26/22 1420    11/24/22 0900  mupirocin 2 % ointment         11/29 0859 Nasl 2 times daily 11/24/22 0319          - CTM     Hematology/Oncology:  - H/H stable  - Lovenox DVT prophylaxis      FEN/GI  - Transaminitis and increased alk phos   - U/S of abdomen + liver dopppler -- biliary sludge, otherwise unremarkable; doppler wnl   - ongoing workup per medicine  - Will initiate trickle TFs   - Postoperative hypoparathyroidism   - Hypocalcemia on initial admission     - Continue tums QID, calcitriol   - Replace electrolytes as needed to keep K>4, Mg>2 --repleted K+ 11/27  - Bowel reg  - Protonix for GI prophylaxis, GERD  - STRICT nausea control ondansetron, phenergan       MSK  - Out of bed as tolerated  - STSG donor site healed     Dispo:  - Continue hospital stay, ongoing ID/IM workup for transaminitis and fungemia/septicemia

## 2022-11-27 NOTE — PROGRESS NOTES
[facility] Medicine  Progress Note    Patient Name: Cyrus Batres Jr.  MRN: [id number]  Patient Class: IP- Inpatient   Admission Date: 11/23/2022  Length of Stay: 4 days  Attending Physician: Matheus Levy MD  Primary Care Provider: Maico Patterson MD        Subjective:     Principal Problem:<principal problem not specified>        HPI:  Patient is a 72-year-old  male with past medical history significant for hypertension, hypothyroidism, type 2 diabetes, GERD, laryngeal cancer status post resection and recently excised neoplasm at base of tongue who just underwent a total glossectomy, pharyngectomy with lateral thigh reconstruction and skin grafting on 11/09/2022.  He was discharged from the hospital on 11/20/2022 and presented 2 days later with shortness of breath, heavy mucus production, and hemoptysis.  Was awake and alert but is unable to speak due to previously aforementioned surgeries.  He is accompanied by his wife who provide supplemental history.  He is previously undergone chemotherapy for his cancer, most recently receiving platinum agents and pembrolizumab in September of this year.  In the emergency room as the outside hospital [place] CT imaging showed an irregular fluid collection in the submandibular space suspicious for phlegmon/abscess.  White count was elevated to 18.6 and the patient was hypocalcemic.  He additionally had marked elevation of his alkaline phosphatase to the 700s as well as new elevations in his bilirubin to 4.8 and AST/ALT most consistent with a cholestatic pattern.  His liver function enzymes continue to worsen and the patient developed new onset jaundice with scleral icterus.  He has no prior history of any liver disease although previous chart review reveals a positive antibody to hepatitis-C.  Initial workup was sent off and Hospital Medicine was consulted for cholestatic hepatitis evaluation.    The patient underwent a abdominal ultrasound which  revealed biliary sludge but no obstructing stone.  Blood cultures taken from admission were positive for Candida glabrata as well as Pseudomonas aeruginosa.  The patient was started on piperacillin/tazobactam for empiric antibiotic coverage and following the discovery of a Candida infectious Disease was consulted.      Overview/Hospital Course:  Patient LFT improving. 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      Interval History: NAEO. Continued to do well. Labs continue to improve. Patient jaundice improving and no symptoms at this time.    Objective:     Vital Signs (Most Recent):  Temp: 98 °F (36.7 °C) (11/27/22 1124)  Pulse: 75 (11/27/22 1124)  Resp: 18 (11/27/22 1124)  BP: 112/60 (11/27/22 1124)  SpO2: (!) 94 % (11/27/22 1124) Vital Signs (24h Range):  Temp:  [97 °F (36.1 °C)-98 °F (36.7 °C)] 98 °F (36.7 °C)  Pulse:  [75-87] 75  Resp:  [16-20] 18  SpO2:  [94 %-100 %] 94 %  BP: (112-129)/(60-73) 112/60     Weight: 62.1 kg (137 lb)  Body mass index is 22.11 kg/m².    Intake/Output Summary (Last 24 hours) at 11/27/2022 1337  Last data filed at 11/27/2022 0450  Gross per 24 hour   Intake 3354.85 ml   Output 2150 ml   Net 1204.85 ml        Physical Exam  Vitals and nursing note reviewed.   Constitutional:       General: He is not in acute distress.     Appearance: Normal appearance. He is not ill-appearing.      Comments: Trach present.  G-tube present.  Nonpurulent mucus present at tip of tracheostomy.  Jaundiced with scleral icterus.  No abdominal tenderness.  Bowel sounds normal   HENT:      Head: Normocephalic and atraumatic.      Comments:  face swelling  Eyes:      General: Scleral icterus present.      Pupils: Pupils are equal, round, and reactive to light.   Cardiovascular:      Rate and Rhythm: Normal rate and regular rhythm.      Pulses: Normal pulses.      Heart sounds: Normal heart sounds.   Pulmonary:      Effort: Pulmonary effort is normal. No respiratory  distress.      Breath sounds: Normal breath sounds.   Abdominal:      General: Abdomen is flat. Bowel sounds are normal. There is no distension.      Palpations: Abdomen is soft.      Tenderness: There is no abdominal tenderness.   Musculoskeletal:      Right lower leg: No edema.      Left lower leg: No edema.   Lymphadenopathy:      Cervical: No cervical adenopathy.   Skin:     General: Skin is warm and dry.      Capillary Refill: Capillary refill takes less than 2 seconds.      Coloration: Skin is jaundiced.   Neurological:      General: No focal deficit present.      Mental Status: He is alert and oriented to person, place, and time.      Cranial Nerves: No cranial nerve deficit.   Psychiatric:         Mood and Affect: Mood normal.         Behavior: Behavior normal.         Thought Content: Thought content normal.         Judgment: Judgment normal.       Significant Labs: All pertinent labs within the past 24 hours have been reviewed.    Significant Imaging: I have reviewed all pertinent imaging results/findings within the past 24 hours.        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  On presentation the patient had marked elevation in alkaline phosphatase, AST/ALT with an are value of 0.49 suggestive of cholestatic pattern of liver injury.  Patient has had multiple hospitalizations with new medications including a course of ampicillin/sulbactam but otherwise no other agents with strong association with this particular pattern of liver injury.  The patient denies any over-the-counter supplements or vitamins.  He denies any pain associated with his abdomen outside of his G-tube.  He denies any changes to his  stool color.  He is previously had changes to his tube feeding and was started on trickle feeds over the last 24 hours.  He is not currently on any TPN his blood cultures did grow Pseudomonas in's Candida glabrata but an ultrasound of the right upper quadrant revealed only biliary sludge without any ductal dilation and the patient is without fever making cholangitis/choledocholithiasis very unlikely.  The etiology of the patient's liver injury is most likely drug related and the patient's synthetic liver function remains intact.  We will continue to monitor the patient's labs and follow-up with the labs ordered today while the patient is in hospital.    Hemolysis labs not c/w hemolysis.   Confirmed hepatobiliary source without evidence of liver failure (d/t normal ammonia and synthetic function)  ESR/CRP elevated, unclear if d/t bacteremia/fungemia or from recent surgery. ID consulted for fungemia.  Immunoglobins WNL  Iron and copper labs not suggestive of hemochromatosis or copper metabolism disorder.  TTE unremarkable  Liver US unremarkable.    Labs marginally improved today. Will sign off at this time. Most likely DILI in setting of previous hospital visit. Suspect due to ampicillin/sulbactam during prior admission.     Recommendations:  - daily CMP  - Hep A, B, & C with hep C RNA pending  - We will f/u pending labs if patient is discharged prior to them resulting.          VTE Risk Mitigation (From admission, onward)         Ordered     enoxaparin injection 40 mg  Daily         11/23/22 1654     IP VTE HIGH RISK PATIENT  Once         11/23/22 1654     Place sequential compression device  Until discontinued         11/23/22 1654                Wes Gonsales MD  Department of Hospital Medicine   [facility]

## 2022-11-27 NOTE — SUBJECTIVE & OBJECTIVE
Interval History: NAEON. AFVSS. Trach exchanged for new 6-0 cuffed at bedside.     Infectious disease following with requests as outlined below including removal of port. Discussed with patient, amenable. Eager to get out of hospital.    Medications:  Continuous Infusions:   lactated ringers 100 mL/hr at 11/27/22 0356     Scheduled Meds:   calcitrioL  0.25 mcg Per G Tube Daily    calcium carbonate  1,000 mg Per G Tube 5x Daily    enoxaparin  40 mg Subcutaneous Daily    meropenem (MERREM) IVPB  2 g Intravenous Q8H    micafungin (MYCAMINE) IVPB  100 mg Intravenous Q24H    mupirocin   Nasal BID    pantoprazole  40 mg Intravenous Daily    polyethylene glycol  17 g Per G Tube Daily    vancomycin (VANCOCIN) IVPB  15 mg/kg Intravenous Q12H     PRN Meds:acetaminophen, dextrose 10%, dextrose 10%, glucagon (human recombinant), hydrALAZINE, insulin aspart U-100, ondansetron, oxyCODONE, promethazine (PHENERGAN) IVPB, senna-docusate 8.6-50 mg, sodium chloride 0.9%     Review of patient's allergies indicates:   Allergen Reactions    Lovastatin Itching     Objective:     Vital Signs (24h Range):  Temp:  [97 °F (36.1 °C)-98 °F (36.7 °C)] 97.3 °F (36.3 °C)  Pulse:  [79-87] 80  Resp:  [16-20] 18  SpO2:  [95 %-100 %] 100 %  BP: (119-129)/(66-73) 122/68       Lines/Drains/Airways       Central Venous Catheter Line  Duration             Port A Cath Single Lumen left subclavian -- days              Drain  Duration                  Gastrostomy/Enterostomy LUQ -- days              Airway  Duration             Adult Surgical Airway Shiley Cuffed -- days                    Physical Exam  NAD  Awake and alert  Mild facial edema   Trach within stoma, removed. Stoma appears healthy  Skin graft to midline neck overlying flap, at left superior portion small area of breakdown though without drainage and remains with adequate coverage  Small area of dehiscence right laterally, near area of STSG, packing removed. Unable to express any drainage,  stable in appearance with no drainage  Intraoral flap is soft without fluctuance, secretions thin, clear intraorally  Tracheal secretions increased but clear     Significant Labs:  CBC:   Recent Labs   Lab 11/25/22  2342   WBC 7.36   RBC 2.39*   HGB 7.1*   HCT 22.2*      MCV 93   MCH 29.7   MCHC 32.0     CMP:   Recent Labs   Lab 11/27/22  0627   *   CALCIUM 6.9*   ALBUMIN 1.8*   PROT 5.2*   *   K 3.2*   CO2 21*      BUN 7*   CREATININE 0.7   ALKPHOS 1,219*   *   AST 90*   BILITOT 2.9*     LFTs:   Recent Labs   Lab 11/27/22  0627   *   AST 90*   ALKPHOS 1,219*   BILITOT 2.9*   PROT 5.2*   ALBUMIN 1.8*       Significant Diagnostics:  None

## 2022-11-28 LAB
ALBUMIN SERPL BCP-MCNC: 1.8 G/DL (ref 3.5–5.2)
ALP SERPL-CCNC: 1159 U/L (ref 55–135)
ALT SERPL W/O P-5'-P-CCNC: 122 U/L (ref 10–44)
ANA SER QL IF: NORMAL
ANCA AB TITR SER IF: NORMAL TITER
ANION GAP SERPL CALC-SCNC: 7 MMOL/L (ref 8–16)
ANISOCYTOSIS BLD QL SMEAR: SLIGHT
AST SERPL-CCNC: 86 U/L (ref 10–40)
BASOPHILS # BLD AUTO: ABNORMAL K/UL (ref 0–0.2)
BASOPHILS NFR BLD: 1 % (ref 0–1.9)
BILIRUB SERPL-MCNC: 2.3 MG/DL (ref 0.1–1)
BUN SERPL-MCNC: 5 MG/DL (ref 8–23)
CALCIUM SERPL-MCNC: 7 MG/DL (ref 8.7–10.5)
CHLORIDE SERPL-SCNC: 104 MMOL/L (ref 95–110)
CO2 SERPL-SCNC: 25 MMOL/L (ref 23–29)
CREAT SERPL-MCNC: 0.7 MG/DL (ref 0.5–1.4)
DIFFERENTIAL METHOD: ABNORMAL
EOSINOPHIL # BLD AUTO: ABNORMAL K/UL (ref 0–0.5)
EOSINOPHIL NFR BLD: 1 % (ref 0–8)
ERYTHROCYTE [DISTWIDTH] IN BLOOD BY AUTOMATED COUNT: 14.9 % (ref 11.5–14.5)
EST. GFR  (NO RACE VARIABLE): >60 ML/MIN/1.73 M^2
GLUCOSE SERPL-MCNC: 130 MG/DL (ref 70–110)
HCT VFR BLD AUTO: 25.5 % (ref 40–54)
HCV RNA SERPL NAA+PROBE-ACNC: NORMAL IU/ML
HCV RNA SERPL QL NAA+PROBE: NOT DETECTED
HCV RNA SPEC NAA+PROBE-ACNC: NOT DETECTED IU/ML
HGB BLD-MCNC: 8.1 G/DL (ref 14–18)
HYPOCHROMIA BLD QL SMEAR: ABNORMAL
IMM GRANULOCYTES # BLD AUTO: ABNORMAL K/UL (ref 0–0.04)
IMM GRANULOCYTES NFR BLD AUTO: ABNORMAL % (ref 0–0.5)
LYMPHOCYTES # BLD AUTO: ABNORMAL K/UL (ref 1–4.8)
LYMPHOCYTES NFR BLD: 7 % (ref 18–48)
MCH RBC QN AUTO: 29.6 PG (ref 27–31)
MCHC RBC AUTO-ENTMCNC: 31.8 G/DL (ref 32–36)
MCV RBC AUTO: 93 FL (ref 82–98)
MONOCYTES # BLD AUTO: ABNORMAL K/UL (ref 0.3–1)
MONOCYTES NFR BLD: 7 % (ref 4–15)
NEUTROPHILS NFR BLD: 82 % (ref 38–73)
NEUTS BAND NFR BLD MANUAL: 2 %
NRBC BLD-RTO: 0 /100 WBC
OVALOCYTES BLD QL SMEAR: ABNORMAL
P-ANCA TITR SER IF: NORMAL TITER
PATH REV BLD -IMP: NORMAL
PLATELET # BLD AUTO: 398 K/UL (ref 150–450)
PLATELET BLD QL SMEAR: ABNORMAL
PMV BLD AUTO: 10 FL (ref 9.2–12.9)
POCT GLUCOSE: 126 MG/DL (ref 70–110)
POCT GLUCOSE: 169 MG/DL (ref 70–110)
POIKILOCYTOSIS BLD QL SMEAR: SLIGHT
POTASSIUM SERPL-SCNC: 3.5 MMOL/L (ref 3.5–5.1)
PROT SERPL-MCNC: 4.9 G/DL (ref 6–8.4)
RBC # BLD AUTO: 2.74 M/UL (ref 4.6–6.2)
SODIUM SERPL-SCNC: 136 MMOL/L (ref 136–145)
WBC # BLD AUTO: 5.47 K/UL (ref 3.9–12.7)

## 2022-11-28 PROCEDURE — 85027 COMPLETE CBC AUTOMATED: CPT | Performed by: STUDENT IN AN ORGANIZED HEALTH CARE EDUCATION/TRAINING PROGRAM

## 2022-11-28 PROCEDURE — 63600175 PHARM REV CODE 636 W HCPCS: Mod: JG | Performed by: STUDENT IN AN ORGANIZED HEALTH CARE EDUCATION/TRAINING PROGRAM

## 2022-11-28 PROCEDURE — 63600175 PHARM REV CODE 636 W HCPCS: Performed by: STUDENT IN AN ORGANIZED HEALTH CARE EDUCATION/TRAINING PROGRAM

## 2022-11-28 PROCEDURE — 36415 COLL VENOUS BLD VENIPUNCTURE: CPT | Performed by: STUDENT IN AN ORGANIZED HEALTH CARE EDUCATION/TRAINING PROGRAM

## 2022-11-28 PROCEDURE — 36590 REMOVAL TUNNELED CV CATH: CPT

## 2022-11-28 PROCEDURE — 25000003 PHARM REV CODE 250: Performed by: STUDENT IN AN ORGANIZED HEALTH CARE EDUCATION/TRAINING PROGRAM

## 2022-11-28 PROCEDURE — 99900035 HC TECH TIME PER 15 MIN (STAT)

## 2022-11-28 PROCEDURE — 25000242 PHARM REV CODE 250 ALT 637 W/ HCPCS: Performed by: STUDENT IN AN ORGANIZED HEALTH CARE EDUCATION/TRAINING PROGRAM

## 2022-11-28 PROCEDURE — 63700000 PHARM REV CODE 250 ALT 637 W/O HCPCS: Performed by: STUDENT IN AN ORGANIZED HEALTH CARE EDUCATION/TRAINING PROGRAM

## 2022-11-28 PROCEDURE — 63600175 PHARM REV CODE 636 W HCPCS

## 2022-11-28 PROCEDURE — C9113 INJ PANTOPRAZOLE SODIUM, VIA: HCPCS | Performed by: STUDENT IN AN ORGANIZED HEALTH CARE EDUCATION/TRAINING PROGRAM

## 2022-11-28 PROCEDURE — 63600175 PHARM REV CODE 636 W HCPCS: Performed by: OTOLARYNGOLOGY

## 2022-11-28 PROCEDURE — 88300 SURGICAL PATH GROSS: CPT | Mod: 26,,, | Performed by: PATHOLOGY

## 2022-11-28 PROCEDURE — 25000003 PHARM REV CODE 250

## 2022-11-28 PROCEDURE — 27000221 HC OXYGEN, UP TO 24 HOURS

## 2022-11-28 PROCEDURE — 99900026 HC AIRWAY MAINTENANCE (STAT)

## 2022-11-28 PROCEDURE — 20600001 HC STEP DOWN PRIVATE ROOM

## 2022-11-28 PROCEDURE — 27200966 HC CLOSED SUCTION SYSTEM

## 2022-11-28 PROCEDURE — 99024: ICD-10-PCS | Mod: GC,,,

## 2022-11-28 PROCEDURE — 25000003 PHARM REV CODE 250: Performed by: OTOLARYNGOLOGY

## 2022-11-28 PROCEDURE — 99233 SBSQ HOSP IP/OBS HIGH 50: CPT | Mod: GC,,, | Performed by: INTERNAL MEDICINE

## 2022-11-28 PROCEDURE — 80053 COMPREHEN METABOLIC PANEL: CPT | Performed by: STUDENT IN AN ORGANIZED HEALTH CARE EDUCATION/TRAINING PROGRAM

## 2022-11-28 PROCEDURE — 94761 N-INVAS EAR/PLS OXIMETRY MLT: CPT

## 2022-11-28 PROCEDURE — 88300 SURGICAL PATH GROSS: CPT | Performed by: PATHOLOGY

## 2022-11-28 PROCEDURE — 85007 BL SMEAR W/DIFF WBC COUNT: CPT | Performed by: STUDENT IN AN ORGANIZED HEALTH CARE EDUCATION/TRAINING PROGRAM

## 2022-11-28 PROCEDURE — 99233 PR SUBSEQUENT HOSPITAL CARE,LEVL III: ICD-10-PCS | Mod: GC,,, | Performed by: INTERNAL MEDICINE

## 2022-11-28 PROCEDURE — 99024 POSTOP FOLLOW-UP VISIT: CPT | Mod: GC,,,

## 2022-11-28 PROCEDURE — 88300 PR  SURG PATH,GROSS,LEVEL I: ICD-10-PCS | Mod: 26,,, | Performed by: PATHOLOGY

## 2022-11-28 RX ORDER — LIDOCAINE HYDROCHLORIDE AND EPINEPHRINE 5; 5 MG/ML; UG/ML
10 INJECTION, SOLUTION INFILTRATION; PERINEURAL ONCE
Status: DISCONTINUED | OUTPATIENT
Start: 2022-11-28 | End: 2022-11-28

## 2022-11-28 RX ORDER — LIDOCAINE HCL/EPINEPHRINE/PF 2%-1:200K
10 VIAL (ML) INJECTION ONCE
Status: COMPLETED | OUTPATIENT
Start: 2022-11-28 | End: 2022-11-28

## 2022-11-28 RX ADMIN — OXYCODONE HYDROCHLORIDE 5 MG: 5 SOLUTION ORAL at 09:11

## 2022-11-28 RX ADMIN — MEROPENEM 2 G: 1 INJECTION INTRAVENOUS at 08:11

## 2022-11-28 RX ADMIN — CALCIUM CARBONATE (ANTACID) CHEW TAB 500 MG 1000 MG: 500 CHEW TAB at 09:11

## 2022-11-28 RX ADMIN — PANTOPRAZOLE SODIUM 40 MG: 40 INJECTION, POWDER, FOR SOLUTION INTRAVENOUS at 08:11

## 2022-11-28 RX ADMIN — MUPIROCIN: 20 OINTMENT TOPICAL at 08:11

## 2022-11-28 RX ADMIN — POLYETHYLENE GLYCOL 3350 17 G: 17 POWDER, FOR SOLUTION ORAL at 08:11

## 2022-11-28 RX ADMIN — CALCIUM CARBONATE (ANTACID) CHEW TAB 500 MG 1000 MG: 500 CHEW TAB at 10:11

## 2022-11-28 RX ADMIN — CALCIUM CARBONATE (ANTACID) CHEW TAB 500 MG 1000 MG: 500 CHEW TAB at 01:11

## 2022-11-28 RX ADMIN — MEROPENEM 2 G: 1 INJECTION INTRAVENOUS at 10:11

## 2022-11-28 RX ADMIN — LIDOCAINE HYDROCHLORIDE,EPINEPHRINE BITARTRATE 10 ML: 20; .005 INJECTION, SOLUTION EPIDURAL; INFILTRATION; INTRACAUDAL; PERINEURAL at 02:11

## 2022-11-28 RX ADMIN — VANCOMYCIN HYDROCHLORIDE 750 MG: 750 INJECTION, POWDER, LYOPHILIZED, FOR SOLUTION INTRAVENOUS at 11:11

## 2022-11-28 RX ADMIN — MICAFUNGIN SODIUM 100 MG: 100 INJECTION, POWDER, LYOPHILIZED, FOR SOLUTION INTRAVENOUS at 08:11

## 2022-11-28 RX ADMIN — MEROPENEM 2 G: 1 INJECTION INTRAVENOUS at 03:11

## 2022-11-28 RX ADMIN — INSULIN ASPART 2 UNITS: 100 INJECTION, SOLUTION INTRAVENOUS; SUBCUTANEOUS at 12:11

## 2022-11-28 RX ADMIN — INSULIN ASPART 2 UNITS: 100 INJECTION, SOLUTION INTRAVENOUS; SUBCUTANEOUS at 05:11

## 2022-11-28 RX ADMIN — CALCIUM CARBONATE (ANTACID) CHEW TAB 500 MG 1000 MG: 500 CHEW TAB at 05:11

## 2022-11-28 RX ADMIN — CALCITRIOL 0.25 MCG: 1 SOLUTION ORAL at 08:11

## 2022-11-28 RX ADMIN — SODIUM CHLORIDE, POTASSIUM CHLORIDE, SODIUM LACTATE AND CALCIUM CHLORIDE: 600; 310; 30; 20 INJECTION, SOLUTION INTRAVENOUS at 01:11

## 2022-11-28 RX ADMIN — VANCOMYCIN HYDROCHLORIDE 750 MG: 750 INJECTION, POWDER, LYOPHILIZED, FOR SOLUTION INTRAVENOUS at 10:11

## 2022-11-28 RX ADMIN — ENOXAPARIN SODIUM 40 MG: 40 INJECTION SUBCUTANEOUS at 05:11

## 2022-11-28 NOTE — CONSULTS
"Consultation Report  Ophthalmology Service    Date: 11/28/2022    Chief complaint/Reason for Consult: "ID recs for fungemia"     History of Present Illness: Cyrus Batres Jr. is a 72 y.o. 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 Presented this admission for worsening SOB, found to have bacteremia with staph epidermidis and pseudomonas (found on both blood and tracheal aspirate culture), as well as fungemia with Candida glabrata isolated from Bcx drawn on 11/22. Suspected source is fluid collection seen on CT neck from OSH.     Patient reports his right eye has always been weaker than his left, but denies hx of patching or any other significant POHx. Denies any recent visual changes, blurry vision, eye pain, diplopia, flashes, or curtains/veils over visual field. Patient reports his eyes have been dry with minimal tearing and occasional irritation over the past 2 weeks. States that he typically sees floaters, which have slightly increased in frequency over this same timeframe. Denies any fevers, chills, or systemic sx.      POcularHx:   Refractive error (no contact lens use), Presbyopia, ?Amblyopia   Denies history of ocular problems or past ocular surgeries.    Current eye gtts: Denies     Family Hx: Denies family history of glaucoma, macular degeneration, or blindness. family history is not on file.     PMHx:  has a past medical history of Hypothyroidism (11/22/2022), PEG (percutaneous endoscopic gastrostomy) adjustment/replacement/removal (11/22/2022), and Type 2 diabetes mellitus, without long-term current use of insulin (11/22/2022).     PSurgHx:  has no past surgical history on file.     Home Medications:   Prior to Admission medications    Not on File        Medications this encounter:    calcitrioL  0.25 mcg Per G Tube " Daily    calcium carbonate  1,000 mg Per G Tube 5x Daily    enoxaparin  40 mg Subcutaneous Daily    meropenem (MERREM) IVPB  2 g Intravenous Q8H    micafungin (MYCAMINE) IVPB  100 mg Intravenous Q24H    mupirocin   Nasal BID    pantoprazole  40 mg Intravenous Daily    polyethylene glycol  17 g Per G Tube Daily    vancomycin (VANCOCIN) IVPB  750 mg Intravenous Q12H       Allergies: is allergic to lovastatin.     Social:       ROS: As per HPI    Ocular examination/Dilated fundus examination:  Base Eye Exam       Visual Acuity (Snellen - Linear)         Right Left    Dist cc 20/50 20/30    Dist ph cc 20/40 20/25              Tonometry (Tonopen, 9:21 AM)         Right Left    Pressure 13 14              Pupils         Pupils Dark Light Shape React APD    Right PERRL 5 4 Round Brisk None    Left PERRL 5 4 Round Brisk None              Visual Fields         Right Left     Full Full              Extraocular Movement         Right Left     Full, Ortho Full, Ortho              Neuro/Psych       Oriented x3: Yes    Mood/Affect: Normal              Dilation       Both eyes: 1% Mydriacyl, 2.5% Phenylephrine @ 9:22 AM                  Slit Lamp and Fundus Exam       External Exam         Right Left    External Normal Normal              Slit Lamp Exam         Right Left    Lids/Lashes Normal Normal    Conjunctiva/Sclera White and quiet White and quiet    Cornea Clear Clear    Anterior Chamber Deep and formed Deep and formed    Iris Round and reactive Round and reactive    Lens NSC, trace CC NSC    Anterior Vitreous PVD Normal              Fundus Exam         Right Left    Disc Pink & sharp Pink & sharp    Macula Flat Flat    Vessels Normal Normal    Periphery Flat w/o holes/tears/detachment  Flat w/o holes/tears/detachment                       Assessment/Plan:     1. Bacteremia/Fungemia w/o Ocular Involvement   2. PVD, OD  - Ophtho consulted to rule out endogenous endophthalmitis  - BCx 11/22 Positive for staph epidermidis and  pseudomonas (found on both blood and tracheal aspirate culture), and Candida glabrata   - Patient asymptomatic and denies any new vision changes, eye pain, or flashes/floaters/curtains  - No findings of intraocular fungal infection (such as AC reaction, vitreitis, or discrete, multifocal, yellow-white choroidal or retinal lesions or fluffy lesions in either eye on fundoscopic exam)  - Agree w/ treatment of Bacteremia/Fungemia per ID  - Counseled patient on return precautions, RD precautions, and counseled patient to alert staff for any visual changes   - Please re-consult ophthalmology if new ocular or vision concerns arise    Patient's Best Contact Number: [phone]     Mitesh Ramírez MD  [facility] Ophthalmology, PGY-2  11/28/2022  8:39 AM

## 2022-11-28 NOTE — ASSESSMENT & PLAN NOTE
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 Presented this admission for worsening SOB,  Found to have bacteremia with staph epidermidis and pseudomonas (found on both blood and tracheal aspirate culture), as well as fungemia with Candida glabrata isolated from OSH - suspect from fluid collection seen on CT from OSH. Also noted to have Pseudomonas ib blood cultures and tracheal aspirate cultures. TTE with no vegetations. Ophthalmology performed eye exam -No findings of intraocular fungal infection. Repeat CT     Recommendations:    1. s/p port removal and eye exam without any findings concerning for fungal infection per ophthalmology. TTE without vegetations. Plan for 2 week course of Vancomycin, Micafungin and Meropenem. Repeat Blood cultures from 11/26 show no growth so far.     2. Repeat CT shows persistent irregular fluid collections within submandibular space, slightly increased.   We will leave OPAT recs once further plan is finalized, I.e. if any further surgical management remains.

## 2022-11-28 NOTE — PROGRESS NOTES
[facility]  Otorhinolaryngology-Head & Neck Surgery  Progress Note    Subjective:     Post-Op Info:  * No surgery found *      Hospital Day: 6     Interval History: NAEON. Tolerating trickle feeds. CT read pending.     Medications:  Continuous Infusions:   lactated ringers 100 mL/hr at 11/28/22 0557     Scheduled Meds:   calcitrioL  0.25 mcg Per G Tube Daily    calcium carbonate  1,000 mg Per G Tube 5x Daily    enoxaparin  40 mg Subcutaneous Daily    meropenem (MERREM) IVPB  2 g Intravenous Q8H    micafungin (MYCAMINE) IVPB  100 mg Intravenous Q24H    mupirocin   Nasal BID    pantoprazole  40 mg Intravenous Daily    polyethylene glycol  17 g Per G Tube Daily    vancomycin (VANCOCIN) IVPB  750 mg Intravenous Q12H     PRN Meds:acetaminophen, dextrose 10%, dextrose 10%, glucagon (human recombinant), hydrALAZINE, insulin aspart U-100, ondansetron, oxyCODONE, promethazine (PHENERGAN) IVPB, senna-docusate 8.6-50 mg, sodium chloride 0.9%     Review of patient's allergies indicates:   Allergen Reactions    Lovastatin Itching     Objective:     Vital Signs (24h Range):  Temp:  [97.6 °F (36.4 °C)-98.6 °F (37 °C)] 98.1 °F (36.7 °C)  Pulse:  [73-83] 73  Resp:  [18-20] 20  SpO2:  [93 %-100 %] 96 %  BP: (112-130)/(60-69) 129/60       Lines/Drains/Airways       Central Venous Catheter Line  Duration             Port A Cath Single Lumen left subclavian -- days              Drain  Duration                  Gastrostomy/Enterostomy LUQ -- days              Airway  Duration             Adult Surgical Airway Shiley Cuffed -- days         Laryngectomy (Do Not Remove) 11/09/22 Laryngectomy stoma 19 days                    Physical Exam  NAD  Awake and alert  Mild facial edema   Trach within stoma, removed. Stoma appears healthy  Skin graft to midline neck overlying flap, at left superior portion small area of breakdown though without drainage and remains with adequate coverage  Small area of dehiscence right laterally,  "near area of STSG, packing removed. Unable to express any drainage, stable in appearance with no drainage  Intraoral flap is soft without fluctuance, secretions thin, clear intraorally  Tracheal secretions increased but clear        Significant Labs:  CBC:   Recent Labs   Lab 11/25/22  2342   WBC 7.36   RBC 2.39*   HGB 7.1*   HCT 22.2*      MCV 93   MCH 29.7   MCHC 32.0     CMP:   Recent Labs   Lab 11/28/22  0443   *   CALCIUM 7.0*   ALBUMIN 1.8*   PROT 4.9*      K 3.5   CO2 25      BUN 5*   CREATININE 0.7   ALKPHOS 1,159*   *   AST 86*   BILITOT 2.3*       Significant Diagnostics:  CT: I have reviewed all pertinent results/findings within the past 24 hours and my personal findings are:  Official read pending, but appears to have fluid collection on L at FOM/submandibular space     Assessment/Plan:     Laryngeal cancer  72 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    Currently with bacteremia and fungemia, unclear source at this point. Repeat CT AM pending but appears to have fluid collection.      ENT/HNS:  - Laryngectomy protocol   -Sign above bed + outside door stating patient is "neck breather" and "can not be intubated by mouth"   -Clean laryngectomy stoma as needed, or daily  - Keep tracheostomy in place     - OK to replace tracheostomy if dislodged, only in place for swelling/secretion assistance -- replaced 11/27  - Humidified O2 via trach collar  - Will follow up official CT read      Neuro:   - Pain: Multimodality PRN regimen initiated     Cardiac:  - HDS  - Page ENT before starting vasopressors  - H/O of paroxysmal a fib, will CTM      Pulmonary:   - PRN breathing treatments  - Humidified air per trach collar  - OK to remove tracheostomy for adequate suctioning     Renal:   - monitor Is and Os  - Cr stable     Infectious Disease:  - " Leukocytosis and BC positive for pseudomonas -- now improved WBC \  - ID consulted; appreciate recommendations   - TTE unremarkable    - CT neck read pending but appears to have fluid collection   - Request port removal as possible source given fungemia/bacteremia with repeat blood cultures x2 once removed, discussed with patient will call general surgery once midline access achieved  - Midline team consulted   - Antibiotics per ID team, discussed current POC to escalate anitbiotics  Antibiotics (From admission, onward)    Start     Stop Route Frequency Ordered    11/27/22 2300  vancomycin 750 mg in dextrose 5 % 250 mL IVPB (ready to mix system)         -- IV Every 12 hours (non-standard times) 11/27/22 1139    11/26/22 1530  meropenem (MERREM) 2 g in sodium chloride 0.9% 100 mL IVPB         -- IV Every 8 hours (non-standard times) 11/26/22 1420    11/24/22 0900  mupirocin 2 % ointment         11/29 0859 Nasl 2 times daily 11/24/22 0319           Hematology/Oncology:  - H/H stable  - Lovenox DVT prophylaxis      FEN/GI  - Transaminitis and increased alk phos   - U/S of abdomen + liver dopppler     -- biliary sludge, otherwise unremarkable; doppler wnl   - Medicine consulted; appreciate recs     - CRP, ESR elevated     - Autoimmune workup negative      - Hepatitis screen pending   - Continue trickle TFs   - Postoperative hypoparathyroidism   - Hypocalcemia on initial admission     - Continue tums QID, calcitriol   - Replace electrolytes as needed to keep K>4, Mg>2 --repleted K+ 11/27  - Bowel reg  - Protonix for GI prophylaxis, GERD  - STRICT nausea control ondansetron, phenergan       MSK  - Out of bed as tolerated  - STSG donor site healed     Dispo:  - Continue hospital stay, ongoing ID/IM workup for transaminitis and fungemia/septicemia           Jo Ann Bryant MD  Otorhinolaryngology-Head & Neck Surgery  [facility]

## 2022-11-28 NOTE — PROCEDURES
"Cyrus Batres Jr. is a 72 y.o. male patient.    Temp: 98.4 °F (36.9 °C) (11/28/22 1112)  Pulse: 72 (11/28/22 1219)  Resp: 16 (11/28/22 1219)  BP: 131/64 (11/28/22 1112)  SpO2: 97 % (11/28/22 1219)  Weight: 62.1 kg (137 lb) (11/25/22 1800)  Height: 5' 6" (167.6 cm) (11/25/22 1800)       Port Removal    Date/Time: 11/28/2022 3:37 PM  Location procedure was performed: [facility] GENERAL SURGERY  Performed by: Jaz Cline MD  Authorized by: Jaz Cline MD   Location: left chest.  Wound Appearance: clean and nonpurulent      Location:  left Chest     Procedure:  After informed consent and timeout was performed, the patient was taken to the minor room. Patient was placed supine on the table and arms were tucked bytheir side. Following this, local anesthetic was injected on the anterior left chest. A small incision was made and the port pocket was dissected out. Next, the port was removed from the anterior left pocket. A figure of 8 3-0 vicryl was used to close the catheter opening and the catheter was removed. Wound was irrigated and hemostasis was achieved. The port incision was closed using interuppted 3-0 vicryl followed by running 4-0 monocryl subcuticular suture. Dressings were applied and all counts were correct at the end of the procedure. Patient tolerated the procedure well. Pressure was held at the IJ insertion site for 5 minutes.     Complications/Comments: none     Estimated Blood Loss: <2 cc    General surgery will now sign off. Please call with questions or concerns.     Jaz Cline MD  Pager: [phone]  General Surgery PGY-II  [facility]  May luck descend on your side and happiness surround you through each day.    11/28/2022    "

## 2022-11-28 NOTE — SUBJECTIVE & OBJECTIVE
"Interval History: No adverse events, port removal this afternoon. Denies new complaints, reports feeling "ok". Discussed IV antibiotic plan     Review of Systems  Constitutional:  Negative for fatigue.   Respiratory:  Negative for chest tightness and shortness of breath.    Gastrointestinal:  Negative for abdominal pain, blood in stool, nausea and vomiting.   Musculoskeletal:  Negative for myalgias.   Skin:  Positive for wound.   Psychiatric/Behavioral:  Negative for confusion.    Objective:     Vital Signs (Most Recent):  Temp: 97.9 °F (36.6 °C) (11/28/22 0725)  Pulse: 66 (11/28/22 0809)  Resp: 16 (11/28/22 0809)  BP: 127/71 (11/28/22 0725)  SpO2: 99 % (11/28/22 0809) Vital Signs (24h Range):  Temp:  [97.6 °F (36.4 °C)-98.6 °F (37 °C)] 97.9 °F (36.6 °C)  Pulse:  [66-83] 66  Resp:  [16-20] 16  SpO2:  [93 %-99 %] 99 %  BP: (112-130)/(60-71) 127/71     Weight: 62.1 kg (137 lb)  Body mass index is 22.11 kg/m².    Estimated Creatinine Clearance: 85 mL/min (based on SCr of 0.7 mg/dL).    Physical Exam  Constitutional:       Appearance: He is ill-appearing. He is not toxic-appearing or diaphoretic.   HENT:      Head: Normocephalic and atraumatic.      Comments: Trach collar in place, healthy pink stoma  Post op changes,        Nose: Nose normal.      Mouth/Throat:      Mouth: Mucous membranes are moist.      Pharynx: Oropharynx is clear.      Comments: Dental caries  S/p glossectomy   Eyes:      Extraocular Movements: Extraocular movements intact.      Conjunctiva/sclera: Conjunctivae normal.      Pupils: Pupils are equal, round, and reactive to light.   Cardiovascular:      Rate and Rhythm: Normal rate and regular rhythm.      Pulses: Normal pulses.      Heart sounds: Normal heart sounds.   Pulmonary:      Effort: Pulmonary effort is normal.      Breath sounds: Normal breath sounds.   Abdominal:      General: Abdomen is flat. Bowel sounds are normal.      Palpations: Abdomen is soft.      Tenderness: There is no guarding " or rebound.      Comments: PEG site c/d/I, no erythema or purulence   Musculoskeletal:         General: No swelling or deformity.      Cervical back: Normal range of motion and neck supple.   Skin:     General: Skin is warm and dry.      Coloration: Skin is not jaundiced.      Findings: No rash.      Comments: Skin around trach stoma site with small area of dehiscence with scant drainage, serous    Neurological:      Mental Status: He is alert. Mental status is at baseline.      Comments: Communicates non verbally, alert, awake   Psychiatric:         Behavior: Behavior normal.   Significant Labs: Blood Culture:   Recent Labs   Lab 11/22/22  0557 11/23/22  0943 11/26/22  0831 11/26/22  0832   LABBLOO Gram stain aer bottle: Gram negative rods  Results called to and read back by:GERARDO [email or web address];  11/23/2022  09:15 CJD  Gram stain lul bottle: yeast and  Gram negative rods  Results called to and read back by: Dr. Fofana  11/23/2022  23:24 KS3  PSEUDOMONAS AERUGINOSA  For susceptibility see order #[id number]  *  OSCAR GLABRATA*  Gram stain aer bottle:  Gram negative rods  Results called to and read back by: GERARDO [email or web address];  11/23/2022  07:20 CJD  PSEUDOMONAS AERUGINOSA* Gram stain aer bottle: Gram positive cocci and Gram Negative rods  Results called to and read back by:Dr. Camp;  11/24/2022  15:17 CJD  PSEUDOMONAS AERUGINOSA  For susceptibility see order #[id number]  *  COAGULASE-NEGATIVE STAPHYLOCOCCUS SPECIES  Organism is a probable contaminant  * No Growth to date  No Growth to date No Growth to date  No Growth to date     BMP:   Recent Labs   Lab 11/28/22  0443   *      K 3.5      CO2 25   BUN 5*   CREATININE 0.7   CALCIUM 7.0*     CBC: No results for input(s): WBC, HGB, HCT, PLT in the last 48 hours.  CMP:   Recent Labs   Lab 11/26/22  0831 11/27/22  0627 11/28/22  0443   * 135* 136   K 3.3* 3.2* 3.5    104 104   CO2 19* 21* 25   * 154* 130*    BUN 10 7* 5*   CREATININE 0.7 0.7 0.7   CALCIUM 7.2* 6.9* 7.0*   PROT 5.2* 5.2* 4.9*   ALBUMIN 1.8* 1.8* 1.8*   BILITOT 3.6* 2.9* 2.3*   ALKPHOS 1,261* 1,219* 1,159*   * 90* 86*   * 121* 122*   ANIONGAP 11 10 7*     Microbiology Results (last 7 days)       Procedure Component Value Units Date/Time    Blood culture [[id number]] Collected: 11/26/22 0831    Order Status: Completed Specimen: Blood from Peripheral, Right Hand Updated: 11/27/22 1212     Blood Culture, Routine No Growth to date      No Growth to date    Blood culture [[id number]] Collected: 11/26/22 0832    Order Status: Completed Specimen: Blood from Peripheral, Left Hand Updated: 11/27/22 1212     Blood Culture, Routine No Growth to date      No Growth to date          Pathology Results  (Last 10 years)      None          All pertinent labs within the past 24 hours have been reviewed.  Recent Lab Results  (Last 5 results in the past 24 hours)        11/28/22  0443   11/28/22  0012   11/27/22  1851   11/27/22  1356   11/27/22  1123        Albumin 1.8               Alkaline Phosphatase 1,159                              Anion Gap 7               AST 86               BILIRUBIN TOTAL 2.3  Comment: For infants and newborns, interpretation of results should be based  on gestational age, weight and in agreement with clinical  observations.    Premature Infant recommended reference ranges:  Up to 24 hours.............<8.0 mg/dL  Up to 48 hours............<12.0 mg/dL  3-5 days..................<15.0 mg/dL  6-29 days.................<15.0 mg/dL                 BUN 5               Calcium 7.0               Chloride 104               CO2 25               Creatinine 0.7               eGFR >60.0               Glucose 130               POCT Glucose   169   130   175   160       Potassium 3.5               PROTEIN TOTAL 4.9               Sodium 136                                      Significant Imaging: I have reviewed all pertinent imaging  results/findings within the past 24 hours.

## 2022-11-28 NOTE — SUBJECTIVE & OBJECTIVE
Interval History: NAEON. Tolerating trickle feeds. CT read pending.     Medications:  Continuous Infusions:   lactated ringers 100 mL/hr at 11/28/22 0557     Scheduled Meds:   calcitrioL  0.25 mcg Per G Tube Daily    calcium carbonate  1,000 mg Per G Tube 5x Daily    enoxaparin  40 mg Subcutaneous Daily    meropenem (MERREM) IVPB  2 g Intravenous Q8H    micafungin (MYCAMINE) IVPB  100 mg Intravenous Q24H    mupirocin   Nasal BID    pantoprazole  40 mg Intravenous Daily    polyethylene glycol  17 g Per G Tube Daily    vancomycin (VANCOCIN) IVPB  750 mg Intravenous Q12H     PRN Meds:acetaminophen, dextrose 10%, dextrose 10%, glucagon (human recombinant), hydrALAZINE, insulin aspart U-100, ondansetron, oxyCODONE, promethazine (PHENERGAN) IVPB, senna-docusate 8.6-50 mg, sodium chloride 0.9%     Review of patient's allergies indicates:   Allergen Reactions    Lovastatin Itching     Objective:     Vital Signs (24h Range):  Temp:  [97.6 °F (36.4 °C)-98.6 °F (37 °C)] 98.1 °F (36.7 °C)  Pulse:  [73-83] 73  Resp:  [18-20] 20  SpO2:  [93 %-100 %] 96 %  BP: (112-130)/(60-69) 129/60       Lines/Drains/Airways       Central Venous Catheter Line  Duration             Port A Cath Single Lumen left subclavian -- days              Drain  Duration                  Gastrostomy/Enterostomy LUQ -- days              Airway  Duration             Adult Surgical Airway Shiley Cuffed -- days         Laryngectomy (Do Not Remove) 11/09/22 Laryngectomy stoma 19 days                    Physical Exam  NAD  Awake and alert  Mild facial edema   Trach within stoma, removed. Stoma appears healthy  Skin graft to midline neck overlying flap, at left superior portion small area of breakdown though without drainage and remains with adequate coverage  Small area of dehiscence right laterally, near area of STSG, packing removed. Unable to express any drainage, stable in appearance with no drainage  Intraoral flap is soft without fluctuance, secretions thin,  clear intraorally  Tracheal secretions increased but clear        Significant Labs:  CBC:   Recent Labs   Lab 11/25/22  2342   WBC 7.36   RBC 2.39*   HGB 7.1*   HCT 22.2*      MCV 93   MCH 29.7   MCHC 32.0     CMP:   Recent Labs   Lab 11/28/22  0443   *   CALCIUM 7.0*   ALBUMIN 1.8*   PROT 4.9*      K 3.5   CO2 25      BUN 5*   CREATININE 0.7   ALKPHOS 1,159*   *   AST 86*   BILITOT 2.3*       Significant Diagnostics:  CT: I have reviewed all pertinent results/findings within the past 24 hours and my personal findings are:  Official read pending, but appears to have fluid collection on L at FOM/submandibular space

## 2022-11-28 NOTE — PLAN OF CARE
VSS . Afebrile  . Accuchecks treated as per ss . Wife in room and attentive . Patient voiding per urinal . Continues to have mild jaundice . Peg site unremarkable . Mild neck edema . Shiley Trach in place and he  did not require freq suctioning . Discomfort managed well with Oxycodone . Incisions and donor site unremarkable . Educate patient and wife on PIV midline  access . . Shift Uneventful  Problem: Adult Inpatient Plan of Care  Goal: Plan of Care Review  Outcome: Ongoing, Progressing  Goal: Patient-Specific Goal (Individualized)  Outcome: Ongoing, Progressing  Goal: Absence of Hospital-Acquired Illness or Injury  Outcome: Ongoing, Progressing  Goal: Optimal Comfort and Wellbeing  Outcome: Ongoing, Progressing  Goal: Readiness for Transition of Care  Outcome: Ongoing, Progressing

## 2022-11-28 NOTE — PROGRESS NOTES
Patient seen for wound care consultation.   Reviewed chart for this encounter.   See Flow Sheet for findings.    Pt non-verbal, spouse at bedside, pt agreed to assessment.   Pt indicated it hurt to move them.   Wife asked if pt could bathe/shower, she was educated to follow physician orders and to not actively try to remove the scabs as it is acting like a band-aid to the skin.   She voiced understanding,   Noted several intact scabs to right upper leg at graft site  Leave HENRY  Left leg- well approximated staples with dry periwound noted.   Waffle for sacral area to offload pressure    RECOMMENDATIONS:  Leave HENRY, do not pick let the scabs come off on their own.     Discussed POC with patient and primary RN.   See EMR for orders & patient education    Nursing to continue care.    WC sign off at this time  Please re-consult if needed.           11/28/22 1105   WOCN Assessment   WOCN Total Time (mins) 30   Visit Date 11/28/22   Visit Time 1105   Consult Type New   WOCN Speciality Wound   Wound surgical   Intervention assessed;changed;chart review;coordination of care;consult other service;team conference;orders   Teaching on-going        Altered Skin Integrity 11/23/22 Sacral spine #1   Date First Assessed: 11/23/22   Altered Skin Integrity Present on Admission: yes  Location: Sacral spine  Wound Number: #1   Wound Image    Description of Altered Skin Integrity Partial thickness tissue loss. Shallow open ulcer with a red or pink wound bed, without slough. Intact or Open/Ruptured Serum-filled blister.   Dressing Appearance Clean;Dry;Intact   Drainage Amount None   Drainage Characteristics/Odor No odor        Altered Skin Integrity 11/22/22 2030 Right anterior Thigh #1 Graft   Date First Assessed/Time First Assessed: 11/22/22 2030   Altered Skin Integrity Present on Admission: yes  Side: Right  Orientation: anterior  Location: Thigh  Wound Number: #1  Is this injury device related?: No  Primary Wound Type: Graft  Wound  Outc...   Wound Image     Description of Altered Skin Integrity   (dried scab to graft site)   Dressing Appearance Open to air   Drainage Amount None   Drainage Characteristics/Odor No odor   Appearance Intact   Periwound Area Intact;Dry   Wound Edges Other (see comments)   Wound Length (cm) 11.5 cm   Wound Width (cm) 3 cm   Wound Surface Area (cm^2) 34.5 cm^2        Incision/Site 11/22/22 2030 Left Thigh anterior vertical   Date First Assessed/Time First Assessed: 11/22/22 2030   Present Prior to Hospital Arrival?: Yes  Side: Left  Location: Thigh  Orientation: anterior  Incision Type: vertical  Closure Method: Staples  Wound Outcome: (c)    Wound Image     Dressing Appearance Open to air;Dry   Drainage Amount None   Drainage Characteristics/Odor No odor   Appearance Staples intact   Periwound Area Intact;Dry   Wound Length (cm) 32 cm   Wound Width (cm) 0.1 cm   Wound Depth (cm) 0.1 cm   Wound Volume (cm^3) 0.32 cm^3   Wound Surface Area (cm^2) 3.2 cm^2   Periwound Care Dry periwound area maintained     Trach site. Followed by RT

## 2022-11-28 NOTE — PROGRESS NOTES
[facility]  Infectious Disease  Progress Note    Patient Name: Cyrus Batres Jr.  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 Presented this admission for worsening SOB,  Found to have bacteremia with staph epidermidis and pseudomonas (found on both blood and tracheal aspirate culture), as well as fungemia with Candida glabrata isolated from OSH - suspect from fluid collection seen on CT from OSH. Also noted to have Pseudomonas ib blood cultures and tracheal aspirate cultures. TTE with no vegetations. Ophthalmology performed eye exam -No findings of intraocular fungal infection. Repeat CT     Recommendations:    1. s/p port removal and eye exam without any findings concerning for fungal infection per ophthalmology. TTE without vegetations. Plan for 2 week course of Vancomycin, Micafungin and Meropenem. Repeat Blood cultures from 11/26 show no growth so far.     2. Repeat CT shows persistent irregular fluid collections within submandibular space, slightly increased.   We will leave OPAT recs once further plan is finalized, I.e. if any further surgical management remains.                     Anticipated Disposition: TBD    Thank you for your consult. I will follow-up with patient. Please contact us if you have any additional questions.    Emanuel De La Fuente MD  Infectious Disease  [facility]    Subjective:     Principal Problem:<principal problem not specified>    HPI: 73 yo male with advanced SCC of larynx/tongue (maintained on carbo/pem/5FU, last received 8/2022) s/p glossectomy/total  "pharyngectomy with flap on 11/9 transferred from [facility] for ENT evaluation. ID consulted for abx recs. While at [facility], pt was found to have bacteremia (pseudomonas, CONS) and Cglab fungemia. Trach cx site also with Pseudomonas. CTneck at OSH with irregular fluid collection c/f abscess along submandibular space and above tracheostomy site. Pt is currently on cefepime, vanco, and micafungin. ECHO pending. Pt reported increased drainage from trach site that started recently.     Interval History: No adverse events, port removal this afternoon. Denies new complaints, reports feeling "ok". Discussed IV antibiotic plan     Review of Systems  Constitutional:  Negative for fatigue.   Respiratory:  Negative for chest tightness and shortness of breath.    Gastrointestinal:  Negative for abdominal pain, blood in stool, nausea and vomiting.   Musculoskeletal:  Negative for myalgias.   Skin:  Positive for wound.   Psychiatric/Behavioral:  Negative for confusion.    Objective:     Vital Signs (Most Recent):  Temp: 97.9 °F (36.6 °C) (11/28/22 0725)  Pulse: 66 (11/28/22 0809)  Resp: 16 (11/28/22 0809)  BP: 127/71 (11/28/22 0725)  SpO2: 99 % (11/28/22 0809) Vital Signs (24h Range):  Temp:  [97.6 °F (36.4 °C)-98.6 °F (37 °C)] 97.9 °F (36.6 °C)  Pulse:  [66-83] 66  Resp:  [16-20] 16  SpO2:  [93 %-99 %] 99 %  BP: (112-130)/(60-71) 127/71     Weight: 62.1 kg (137 lb)  Body mass index is 22.11 kg/m².    Estimated Creatinine Clearance: 85 mL/min (based on SCr of 0.7 mg/dL).    Physical Exam  Constitutional:       Appearance: He is ill-appearing. He is not toxic-appearing or diaphoretic.   HENT:      Head: Normocephalic and atraumatic.      Comments: Trach collar in place, healthy pink stoma  Post op changes,        Nose: Nose normal.      Mouth/Throat:      Mouth: Mucous membranes are moist.      Pharynx: Oropharynx is clear.      Comments: Dental caries  S/p glossectomy   Eyes:      Extraocular Movements: Extraocular movements intact.      " Conjunctiva/sclera: Conjunctivae normal.      Pupils: Pupils are equal, round, and reactive to light.   Cardiovascular:      Rate and Rhythm: Normal rate and regular rhythm.      Pulses: Normal pulses.      Heart sounds: Normal heart sounds.   Pulmonary:      Effort: Pulmonary effort is normal.      Breath sounds: Normal breath sounds.   Abdominal:      General: Abdomen is flat. Bowel sounds are normal.      Palpations: Abdomen is soft.      Tenderness: There is no guarding or rebound.      Comments: PEG site c/d/I, no erythema or purulence   Musculoskeletal:         General: No swelling or deformity.      Cervical back: Normal range of motion and neck supple.   Skin:     General: Skin is warm and dry.      Coloration: Skin is not jaundiced.      Findings: No rash.      Comments: Skin around trach stoma site with small area of dehiscence with scant drainage, serous    Neurological:      Mental Status: He is alert. Mental status is at baseline.      Comments: Communicates non verbally, alert, awake   Psychiatric:         Behavior: Behavior normal.   Significant Labs: Blood Culture:   Recent Labs   Lab 11/22/22  0557 11/23/22  0943 11/26/22  0831 11/26/22  0832   LABBLOO Gram stain aer bottle: Gram negative rods  Results called to and read back by:KRISTA Merchant;  11/23/2022  09:15 CJD  Gram stain lul bottle: yeast and  Gram negative rods  Results called to and read back by: Dr. Fofana  11/23/2022  23:24 KS3  PSEUDOMONAS AERUGINOSA  For susceptibility see order #[id number]  *  OSCAR GLABRATA*  Gram stain aer bottle:  Gram negative rods  Results called to and read back by: KRISTA Merchant;  11/23/2022  07:20 CJD  PSEUDOMONAS AERUGINOSA* Gram stain aer bottle: Gram positive cocci and Gram Negative rods  Results called to and read back by:Dr. Camp;  11/24/2022  15:17 CJD  PSEUDOMONAS AERUGINOSA  For susceptibility see order #[id number]  *  COAGULASE-NEGATIVE STAPHYLOCOCCUS  SPECIES  Organism is a probable contaminant  * No Growth to date  No Growth to date No Growth to date  No Growth to date     BMP:   Recent Labs   Lab 11/28/22  0443   *      K 3.5      CO2 25   BUN 5*   CREATININE 0.7   CALCIUM 7.0*     CBC: No results for input(s): WBC, HGB, HCT, PLT in the last 48 hours.  CMP:   Recent Labs   Lab 11/26/22  0831 11/27/22  0627 11/28/22  0443   * 135* 136   K 3.3* 3.2* 3.5    104 104   CO2 19* 21* 25   * 154* 130*   BUN 10 7* 5*   CREATININE 0.7 0.7 0.7   CALCIUM 7.2* 6.9* 7.0*   PROT 5.2* 5.2* 4.9*   ALBUMIN 1.8* 1.8* 1.8*   BILITOT 3.6* 2.9* 2.3*   ALKPHOS 1,261* 1,219* 1,159*   * 90* 86*   * 121* 122*   ANIONGAP 11 10 7*     Microbiology Results (last 7 days)       Procedure Component Value Units Date/Time    Blood culture [[id number]] Collected: 11/26/22 0831    Order Status: Completed Specimen: Blood from Peripheral, Right Hand Updated: 11/27/22 1212     Blood Culture, Routine No Growth to date      No Growth to date    Blood culture [[id number]] Collected: 11/26/22 0832    Order Status: Completed Specimen: Blood from Peripheral, Left Hand Updated: 11/27/22 1212     Blood Culture, Routine No Growth to date      No Growth to date          Pathology Results  (Last 10 years)      None          All pertinent labs within the past 24 hours have been reviewed.  Recent Lab Results  (Last 5 results in the past 24 hours)        11/28/22  0443   11/28/22  0012   11/27/22  1851   11/27/22  1356   11/27/22  1123        Albumin 1.8               Alkaline Phosphatase 1,159                              Anion Gap 7               AST 86               BILIRUBIN TOTAL 2.3  Comment: For infants and newborns, interpretation of results should be based  on gestational age, weight and in agreement with clinical  observations.    Premature Infant recommended reference ranges:  Up to 24 hours.............<8.0 mg/dL  Up to 48  hours............<12.0 mg/dL  3-5 days..................<15.0 mg/dL  6-29 days.................<15.0 mg/dL                 BUN 5               Calcium 7.0               Chloride 104               CO2 25               Creatinine 0.7               eGFR >60.0               Glucose 130               POCT Glucose   169   130   175   160       Potassium 3.5               PROTEIN TOTAL 4.9               Sodium 136                                      Significant Imaging: I have reviewed all pertinent imaging results/findings within the past 24 hours.

## 2022-11-28 NOTE — PLAN OF CARE
POC reviewed with patient, communicated understanding. AAOx4, VSS. Wilda hood in place.     -TF @ ordered 10ml/hr  -Incisions c/d/i  -IVF infusing per MAR  -NPO, tolerating well  -Adequate UOP via urinal, no BM this shift  -PAC removed today at bedside by MD, specimen sent to lab  -Pain managed with PRN medication    Bed in low and locked position, wheels locked, call light in reach, family to remain at bedside  Problem: Adult Inpatient Plan of Care  Goal: Plan of Care Review  Outcome: Ongoing, Progressing  Goal: Patient-Specific Goal (Individualized)  Outcome: Ongoing, Progressing  Goal: Absence of Hospital-Acquired Illness or Injury  Outcome: Ongoing, Progressing  Goal: Optimal Comfort and Wellbeing  Outcome: Ongoing, Progressing  Goal: Readiness for Transition of Care  Outcome: Ongoing, Progressing     Problem: Diabetes Comorbidity  Goal: Blood Glucose Level Within Targeted Range  Outcome: Ongoing, Progressing     Problem: Infection  Goal: Absence of Infection Signs and Symptoms  Outcome: Ongoing, Progressing     Problem: Impaired Wound Healing  Goal: Optimal Wound Healing  Outcome: Ongoing, Progressing     Problem: Fall Injury Risk  Goal: Absence of Fall and Fall-Related Injury  Outcome: Ongoing, Progressing     Problem: Skin Injury Risk Increased  Goal: Skin Health and Integrity  Outcome: Ongoing, Progressing

## 2022-11-28 NOTE — CONSULTS
General surgery consulted for port removal in the setting of bacteremia/fungemia.     Please see procedure note dated 11/28/22.     Jaz Cline MD  Pager: [phone]  General Surgery PGY-II  [facility] luck descend on your side and happiness surround you through each day.

## 2022-11-29 ENCOUNTER — OUTPATIENT CASE MANAGEMENT (OUTPATIENT)
Dept: ADMINISTRATIVE | Facility: OTHER | Age: 72
End: 2022-11-29
Payer: MEDICARE

## 2022-11-29 LAB
ALBUMIN SERPL BCP-MCNC: 1.9 G/DL (ref 3.5–5.2)
ALP SERPL-CCNC: 1095 U/L (ref 55–135)
ALT SERPL W/O P-5'-P-CCNC: 126 U/L (ref 10–44)
ANION GAP SERPL CALC-SCNC: 9 MMOL/L (ref 8–16)
AST SERPL-CCNC: 81 U/L (ref 10–40)
BASOPHILS # BLD AUTO: 0.05 K/UL (ref 0–0.2)
BASOPHILS NFR BLD: 1.1 % (ref 0–1.9)
BILIRUB SERPL-MCNC: 2 MG/DL (ref 0.1–1)
BUN SERPL-MCNC: 5 MG/DL (ref 8–23)
CALCIUM SERPL-MCNC: 7.3 MG/DL (ref 8.7–10.5)
CHLORIDE SERPL-SCNC: 102 MMOL/L (ref 95–110)
CO2 SERPL-SCNC: 22 MMOL/L (ref 23–29)
CREAT SERPL-MCNC: 0.7 MG/DL (ref 0.5–1.4)
DIFFERENTIAL METHOD: ABNORMAL
EOSINOPHIL # BLD AUTO: 0.1 K/UL (ref 0–0.5)
EOSINOPHIL NFR BLD: 2.9 % (ref 0–8)
ERYTHROCYTE [DISTWIDTH] IN BLOOD BY AUTOMATED COUNT: 15.4 % (ref 11.5–14.5)
EST. GFR  (NO RACE VARIABLE): >60 ML/MIN/1.73 M^2
GLUCOSE SERPL-MCNC: 134 MG/DL (ref 70–110)
HCT VFR BLD AUTO: 25.1 % (ref 40–54)
HGB BLD-MCNC: 8 G/DL (ref 14–18)
IMM GRANULOCYTES # BLD AUTO: 0.19 K/UL (ref 0–0.04)
IMM GRANULOCYTES NFR BLD AUTO: 4.3 % (ref 0–0.5)
LYMPHOCYTES # BLD AUTO: 0.5 K/UL (ref 1–4.8)
LYMPHOCYTES NFR BLD: 11.7 % (ref 18–48)
MCH RBC QN AUTO: 29.9 PG (ref 27–31)
MCHC RBC AUTO-ENTMCNC: 31.9 G/DL (ref 32–36)
MCV RBC AUTO: 94 FL (ref 82–98)
MITOCHONDRIA AB TITR SER IF: NORMAL {TITER}
MONOCYTES # BLD AUTO: 0.3 K/UL (ref 0.3–1)
MONOCYTES NFR BLD: 6.1 % (ref 4–15)
NEUTROPHILS # BLD AUTO: 3.3 K/UL (ref 1.8–7.7)
NEUTROPHILS NFR BLD: 73.9 % (ref 38–73)
NRBC BLD-RTO: 0 /100 WBC
PLATELET # BLD AUTO: 385 K/UL (ref 150–450)
PMV BLD AUTO: 10.2 FL (ref 9.2–12.9)
POCT GLUCOSE: 110 MG/DL (ref 70–110)
POCT GLUCOSE: 113 MG/DL (ref 70–110)
POCT GLUCOSE: 146 MG/DL (ref 70–110)
POCT GLUCOSE: 156 MG/DL (ref 70–110)
POTASSIUM SERPL-SCNC: 3.5 MMOL/L (ref 3.5–5.1)
PROT SERPL-MCNC: 5.1 G/DL (ref 6–8.4)
RBC # BLD AUTO: 2.68 M/UL (ref 4.6–6.2)
SMOOTH MUSCLE AB TITR SER IF: NORMAL {TITER}
SODIUM SERPL-SCNC: 133 MMOL/L (ref 136–145)
VANCOMYCIN TROUGH SERPL-MCNC: 19.2 UG/ML (ref 10–22)
WBC # BLD AUTO: 4.45 K/UL (ref 3.9–12.7)

## 2022-11-29 PROCEDURE — 63600175 PHARM REV CODE 636 W HCPCS: Mod: JG | Performed by: STUDENT IN AN ORGANIZED HEALTH CARE EDUCATION/TRAINING PROGRAM

## 2022-11-29 PROCEDURE — 63600175 PHARM REV CODE 636 W HCPCS: Performed by: STUDENT IN AN ORGANIZED HEALTH CARE EDUCATION/TRAINING PROGRAM

## 2022-11-29 PROCEDURE — 63700000 PHARM REV CODE 250 ALT 637 W/O HCPCS: Performed by: STUDENT IN AN ORGANIZED HEALTH CARE EDUCATION/TRAINING PROGRAM

## 2022-11-29 PROCEDURE — 85025 COMPLETE CBC W/AUTO DIFF WBC: CPT | Performed by: STUDENT IN AN ORGANIZED HEALTH CARE EDUCATION/TRAINING PROGRAM

## 2022-11-29 PROCEDURE — 36415 COLL VENOUS BLD VENIPUNCTURE: CPT | Performed by: STUDENT IN AN ORGANIZED HEALTH CARE EDUCATION/TRAINING PROGRAM

## 2022-11-29 PROCEDURE — 25500020 PHARM REV CODE 255: Performed by: OTOLARYNGOLOGY

## 2022-11-29 PROCEDURE — 25000003 PHARM REV CODE 250: Performed by: STUDENT IN AN ORGANIZED HEALTH CARE EDUCATION/TRAINING PROGRAM

## 2022-11-29 PROCEDURE — 99233 SBSQ HOSP IP/OBS HIGH 50: CPT | Mod: GC,,, | Performed by: INTERNAL MEDICINE

## 2022-11-29 PROCEDURE — 25000003 PHARM REV CODE 250: Performed by: OTOLARYNGOLOGY

## 2022-11-29 PROCEDURE — C9113 INJ PANTOPRAZOLE SODIUM, VIA: HCPCS | Performed by: STUDENT IN AN ORGANIZED HEALTH CARE EDUCATION/TRAINING PROGRAM

## 2022-11-29 PROCEDURE — 94761 N-INVAS EAR/PLS OXIMETRY MLT: CPT

## 2022-11-29 PROCEDURE — 80053 COMPREHEN METABOLIC PANEL: CPT | Performed by: STUDENT IN AN ORGANIZED HEALTH CARE EDUCATION/TRAINING PROGRAM

## 2022-11-29 PROCEDURE — 25000003 PHARM REV CODE 250

## 2022-11-29 PROCEDURE — 63600175 PHARM REV CODE 636 W HCPCS

## 2022-11-29 PROCEDURE — 27200966 HC CLOSED SUCTION SYSTEM

## 2022-11-29 PROCEDURE — 80202 ASSAY OF VANCOMYCIN: CPT | Performed by: OTOLARYNGOLOGY

## 2022-11-29 PROCEDURE — 99900035 HC TECH TIME PER 15 MIN (STAT)

## 2022-11-29 PROCEDURE — 36415 COLL VENOUS BLD VENIPUNCTURE: CPT | Performed by: OTOLARYNGOLOGY

## 2022-11-29 PROCEDURE — 20600001 HC STEP DOWN PRIVATE ROOM

## 2022-11-29 PROCEDURE — 27000221 HC OXYGEN, UP TO 24 HOURS

## 2022-11-29 PROCEDURE — 99233 PR SUBSEQUENT HOSPITAL CARE,LEVL III: ICD-10-PCS | Mod: GC,,, | Performed by: INTERNAL MEDICINE

## 2022-11-29 PROCEDURE — 63600175 PHARM REV CODE 636 W HCPCS: Performed by: OTOLARYNGOLOGY

## 2022-11-29 RX ORDER — CEFEPIME HYDROCHLORIDE 1 G/50ML
2 INJECTION, SOLUTION INTRAVENOUS
Status: DISCONTINUED | OUTPATIENT
Start: 2022-11-29 | End: 2022-12-06

## 2022-11-29 RX ADMIN — CALCIUM CARBONATE (ANTACID) CHEW TAB 500 MG 1000 MG: 500 CHEW TAB at 05:11

## 2022-11-29 RX ADMIN — PANTOPRAZOLE SODIUM 40 MG: 40 INJECTION, POWDER, FOR SOLUTION INTRAVENOUS at 09:11

## 2022-11-29 RX ADMIN — CEFEPIME 2 G: 2 INJECTION, POWDER, FOR SOLUTION INTRAVENOUS at 09:11

## 2022-11-29 RX ADMIN — POLYETHYLENE GLYCOL 3350 17 G: 17 POWDER, FOR SOLUTION ORAL at 09:11

## 2022-11-29 RX ADMIN — MICAFUNGIN SODIUM 100 MG: 100 INJECTION, POWDER, LYOPHILIZED, FOR SOLUTION INTRAVENOUS at 07:11

## 2022-11-29 RX ADMIN — CALCIUM CARBONATE (ANTACID) CHEW TAB 500 MG 1000 MG: 500 CHEW TAB at 09:11

## 2022-11-29 RX ADMIN — CALCIUM CARBONATE (ANTACID) CHEW TAB 500 MG 1000 MG: 500 CHEW TAB at 02:11

## 2022-11-29 RX ADMIN — VANCOMYCIN HYDROCHLORIDE 750 MG: 750 INJECTION, POWDER, LYOPHILIZED, FOR SOLUTION INTRAVENOUS at 12:11

## 2022-11-29 RX ADMIN — ENOXAPARIN SODIUM 40 MG: 40 INJECTION SUBCUTANEOUS at 05:11

## 2022-11-29 RX ADMIN — IOHEXOL 75 ML: 350 INJECTION, SOLUTION INTRAVENOUS at 10:11

## 2022-11-29 RX ADMIN — MEROPENEM 2 G: 1 INJECTION INTRAVENOUS at 02:11

## 2022-11-29 RX ADMIN — MEROPENEM 2 G: 1 INJECTION INTRAVENOUS at 06:11

## 2022-11-29 RX ADMIN — CALCITRIOL 0.25 MCG: 1 SOLUTION ORAL at 09:11

## 2022-11-29 RX ADMIN — CALCIUM CARBONATE (ANTACID) CHEW TAB 500 MG 1000 MG: 500 CHEW TAB at 12:11

## 2022-11-29 RX ADMIN — INSULIN ASPART 2 UNITS: 100 INJECTION, SOLUTION INTRAVENOUS; SUBCUTANEOUS at 12:11

## 2022-11-29 NOTE — PLAN OF CARE
[facility]  Initial Discharge Assessment       Primary Care Provider: Maico Patterson MD    Admission Diagnosis: Post-operative complication [T81.9XXA]    Admission Date: 11/23/2022  Expected Discharge Date: 12/2/2022    Discharge Barriers Identified: None    Payor: [employer] MANAGED MEDICARE / Plan: [employer] MEDICARE HMO / Product Type: Capitation /     Extended Emergency Contact Information  Primary Emergency Contact: Janel Batres  Mobile Phone: [phone]  Relation: Spouse  Preferred language: English   needed? No    Discharge Plan A: Home Health  Discharge Plan B: Home Health    No Pharmacies Listed    Initial Assessment (most recent)       Adult Discharge Assessment - 11/25/22 1139          Discharge Assessment    Assessment Type Discharge Planning Reassessment     Confirmed/corrected address, phone number and insurance Yes     Confirmed Demographics Correct on Facesheet     Source of Information family     If unable to respond/provide information was family/caregiver contacted? Yes     Contact Name/Number [phone]     When was your last doctors appointment? 10/11/22     Does patient/caregiver understand observation status Yes     Communicated CHIARA with patient/caregiver Yes     Reason For Admission Post op complications     Lives With spouse     Facility Arrived From: Home     Do you expect to return to your current living situation? Yes     Do you have help at home or someone to help you manage your care at home? Yes     Who are your caregiver(s) and their phone number(s)? [phone]     Prior to hospitilization cognitive status: Alert/Oriented     Current cognitive status: Alert/Oriented     Walking or Climbing Stairs Difficulty none     Dressing/Bathing Difficulty none     Home Accessibility wheelchair accessible     Home Layout Able to live on 1st floor     Equipment Currently Used at Home none     Readmission within 30 days? No     Patient currently being followed  by outpatient case management? No     Do you currently have service(s) that help you manage your care at home? No     Do you take prescription medications? Yes     Do you have prescription coverage? Yes     Coverage [employer] Manged medicare     Do you have any problems affording any of your prescribed medications? No     Is the patient taking medications as prescribed? yes     Who is going to help you get home at discharge? [phone]     How do you get to doctors appointments? car, drives self     Are you on dialysis? No     Do you take coumadin? No     Discharge Plan A Home with family     Discharge Plan B Home Health     DME Needed Upon Discharge  other (see comments)   Unknown at this time    Discharge Plan discussed with: Spouse/sig other     Name(s) and Number(s) [phone]     Discharge Barriers Identified None                     Pt not ready for discharge due to: Not medically ready  SW will remain available for families in [facility].  Currently pt has d/c plans in progress at this time.    Not medically ready.    Pam Webber LCSW  Case Management/[facility]  [phone]

## 2022-11-29 NOTE — PROGRESS NOTES
Outpatient Care Management  Plan of Care Follow Up Visit    Patient: Cyrus Batres Jr.  MRN: [id number]  Date of Service: 11/29/2022  Completed by: Eugenia Agudelo RN  Referral Date: 09/02/2022    Reason for Visit   Patient presents with    Plan Of Care    Update Plan Of Care     12/21/22       Brief Summary: 11/29/22-He was admitted back to the hospital on 11/22/22 at [facility].   He has Fluid at his surgery site and a bacterial infection in his blood per his wife. He will be discharged with HH and IV infusion. He has a  mid line in his arm. Will continue to follow for discharge plan.   12/02/22-Mr. Batres continues to be in the hospital. Possible discharge on 12/04/22.  12/08/22-Patient per EHR remains in the hospital at this time.   12/21/22-Discharged from the hospital on 12/13/22 with home health nurse. Appt with oncology on 12/27/22.   Next Steps: Follow up in two weeks

## 2022-11-29 NOTE — PROGRESS NOTES
Pharmacokinetic Assessment Follow Up: IV Vancomycin    Vancomycin serum concentration assessment(s):    -Vancomycin level was drawn ~ 10 hrs from the preceding dose and can be used to guide therapy.  -A level of 19.2 mcg/mL is within goal 15-20 mcg/mL for bacteremia.  -True trough likely within goal.  -Renal function stable.     Vancomycin Regimen Plan:    -Continue vancomycin 750 mg IV Q12H.  -Re-assess level on 11/30 @ 11:00.  -Monitor for changes in renal function closely.     Drug levels (last 3 results):  Recent Labs   Lab Result Units 11/27/22  0758 11/29/22  0926   Vancomycin-Trough ug/mL 21.6 19.2       Pharmacy will continue to follow and monitor vancomycin.    Please contact pharmacy at extension [phone] for questions regarding this assessment.    Thank you for the consult,   Abhishek Cavazos       Patient brief summary:  Cyrus Batres Jr. is a 72 y.o. male initiated on antimicrobial therapy with IV Vancomycin for treatment of bacteremia    Drug Allergies:   Review of patient's allergies indicates:   Allergen Reactions    Lovastatin Itching       Actual Body Weight:   62.1 kg    Renal Function:   Estimated Creatinine Clearance: 85 mL/min (based on SCr of 0.7 mg/dL).,     Dialysis Method (if applicable):  N/A    CBC (last 72 hours):  Recent Labs   Lab Result Units 11/28/22  0917 11/29/22  0239   WBC K/uL 5.47 4.45   Hemoglobin g/dL 8.1* 8.0*   Hematocrit % 25.5* 25.1*   Platelets K/uL 398 385   Gran % % 82.0* 73.9*   Lymph % % 7.0* 11.7*   Mono % % 7.0 6.1   Eosinophil % % 1.0 2.9   Basophil % % 1.0 1.1   Differential Method  Manual Automated       Metabolic Panel (last 72 hours):  Recent Labs   Lab Result Units 11/27/22  0627 11/28/22  0443 11/29/22  0239   Sodium mmol/L 135* 136 133*   Potassium mmol/L 3.2* 3.5 3.5   Chloride mmol/L 104 104 102   CO2 mmol/L 21* 25 22*   Glucose mg/dL 154* 130* 134*   BUN mg/dL 7* 5* 5*   Creatinine mg/dL 0.7 0.7 0.7   Albumin g/dL 1.8* 1.8* 1.9*   Total Bilirubin mg/dL 2.9*  2.3* 2.0*   Alkaline Phosphatase U/L 1,219* 1,159* 1,095*   AST U/L 90* 86* 81*   ALT U/L 121* 122* 126*       Vancomycin Administrations:  vancomycin given in the last 96 hours                     vancomycin 750 mg in dextrose 5 % 250 mL IVPB (ready to mix system) (mg) 750 mg New Bag 11/28/22 2344     750 mg New Bag  1011     750 mg New Bag 11/27/22 2234    vancomycin in dextrose 5 % 1 gram/250 mL IVPB 1,000 mg (mg) 1,000 mg New Bag 11/27/22 1108     1,000 mg New Bag 11/26/22 2300     1,000 mg New Bag  0859    vancomycin 1.5 g in dextrose 5 % 250 mL IVPB (ready to mix) (mg) 1,500 mg New Bag 11/25/22 2049                    Microbiologic Results:  Microbiology Results (last 7 days)       Procedure Component Value Units Date/Time    Blood culture [[id number]] Collected: 11/26/22 0831    Order Status: Completed Specimen: Blood from Peripheral, Right Hand Updated: 11/28/22 1212     Blood Culture, Routine No Growth to date      No Growth to date      No Growth to date    Blood culture [[id number]] Collected: 11/26/22 0832    Order Status: Completed Specimen: Blood from Peripheral, Left Hand Updated: 11/28/22 1212     Blood Culture, Routine No Growth to date      No Growth to date      No Growth to date

## 2022-11-29 NOTE — RESPIRATORY THERAPY
RAPID RESPONSE RESPIRATORY THERAPY PROACTIVE NOTE           Time of visit: 1120     Code Status: Full Code   : 1950  Bed: 1055/1055 A:   MRN: [id number]  Time spent at the bedside: < 15 min    SITUATION    Evaluated patient for: LDA Check     BACKGROUND    Patient has a past medical history of Hypothyroidism, PEG (percutaneous endoscopic gastrostomy) adjustment/replacement/removal, and Type 2 diabetes mellitus, without long-term current use of insulin.  Clinically Significant Surgical Hx: laryngectomy    24 Hours Vitals Range:  Temp:  [97.6 °F (36.4 °C)-98.3 °F (36.8 °C)]   Pulse:  [65-87]   Resp:  [15-20]   BP: (110-140)/(60-71)   SpO2:  [93 %-99 %]     Labs:    Recent Labs     22  0627 22  0443 22  0239   * 136 133*   K 3.2* 3.5 3.5    104 102   CO2 21* 25 22*   CREATININE 0.7 0.7 0.7   * 130* 134*        No results for input(s): PH, PCO2, PO2, HCO3, POCSATURATED, BE in the last 72 hours.    ASSESSMENT/INTERVENTIONS  All supplies are at bedside. No needs at this time       Last VS   Temp: 98 °F (36.7 °C) (1539)  Pulse: 70 (1539)  Resp: 18 (1539)  BP: 129/71 (1539)  SpO2: 98 % (1539)      Extra trachs at bedside: 4&6 cuffed and 678 ETT  Level of Consciousness: Level of Consciousness (AVPU): alert  Respiratory Effort: Respiratory Effort: Normal, Unlabored Expansion/Accessory Muscle Usage: Expansion/Accessory Muscles/Retractions: expansion symmetric, no retractions, no use of accessory muscles  All Lung Field Breath Sounds: All Lung Fields Breath Sounds: Anterior:, Lateral:, diminished  O2 Device/Concentration: room air  Surgical airway: Yes, Type: 6 cuffed in antonella stoma  Size: . ,  .  Ambu at bedside: Ambu bag with the patient?: Yes, Adult Ambu     Active Orders   Respiratory Care    Oxygen Continuous     Frequency: Continuous     Number of Occurrences: Until Specified     Order Questions:      Device type: Low flow      Device: Trach Collar       FiO2%: 28      Titrate O2 per Oxygen Titration Protocol: Yes      To maintain SpO2 goal of: >= 90%      Notify MD of: Inability to achieve desired SpO2; Sudden change in patient status and requires 20% increase in FiO2; Patient requires >60% FiO2    Routine tracheostomy care     Frequency: Q12H     Number of Occurrences: Until Specified    Tracheostomy replacement Once     Frequency: Once     Number of Occurrences: 1 Occurrences     Order Comments: Patient's laryngectomy stoma trach tube (6-0 Shiley cuffed) replace with new trach of same size 11/27  Please place additional 6-0 Shiley cuffed trach at bedside, MD to replace prn.         RECOMMENDATIONS    We recommend: RRT Recs: Continue POC per primary team.      FOLLOW-UP    Please call back the Rapid Response RT, Elizabeth Rapp, RRT at x [phone] for any questions or concerns.

## 2022-11-29 NOTE — SUBJECTIVE & OBJECTIVE
Interval History: NAEON     Medications:  Continuous Infusions:   lactated ringers 100 mL/hr at 11/29/22 0616     Scheduled Meds:   calcitrioL  0.25 mcg Per G Tube Daily    calcium carbonate  1,000 mg Per G Tube 5x Daily    enoxaparin  40 mg Subcutaneous Daily    meropenem (MERREM) IVPB  2 g Intravenous Q8H    micafungin (MYCAMINE) IVPB  100 mg Intravenous Q24H    mupirocin   Nasal BID    pantoprazole  40 mg Intravenous Daily    polyethylene glycol  17 g Per G Tube Daily    vancomycin (VANCOCIN) IVPB  750 mg Intravenous Q12H     PRN Meds:acetaminophen, dextrose 10%, dextrose 10%, glucagon (human recombinant), hydrALAZINE, insulin aspart U-100, ondansetron, oxyCODONE, promethazine (PHENERGAN) IVPB, senna-docusate 8.6-50 mg, sodium chloride 0.9%     Review of patient's allergies indicates:   Allergen Reactions    Lovastatin Itching     Objective:     Vital Signs (24h Range):  Temp:  [97.6 °F (36.4 °C)-98.4 °F (36.9 °C)] 97.9 °F (36.6 °C)  Pulse:  [64-87] 66  Resp:  [16-20] 20  SpO2:  [93 %-99 %] 93 %  BP: (110-140)/(60-71) 135/60       Lines/Drains/Airways       Drain  Duration                  Gastrostomy/Enterostomy LUQ -- days              Airway  Duration             Adult Surgical Airway Shiley Cuffed -- days         Laryngectomy (Do Not Remove) 11/09/22 Laryngectomy stoma 20 days              Peripheral Intravenous Line  Duration                  Peripheral IV - Single Lumen 11/28/22 1311 20 G;1 3/4 in Left;Anterior Forearm <1 day                    Physical Exam  NAD  Awake and alert  Mild facial edema   Trach within stoma, removed. Stoma appears healthy  Skin graft to midline neck overlying flap, at left superior portion small area of breakdown though without drainage and remains with adequate coverage  Small area of dehiscence right laterally, near area of STSG, packing removed. Unable to express any drainage, stable in appearance with no drainage  Left area with packing in place, removed. Yellow  drainage  suctioned and repacked.   Intraoral flap is soft without fluctuance, secretions thin, clear intraorally  Tracheal secretions increased but clear     Significant Labs:  All pertinent labs from the last 24 hours have been reviewed.    Significant Diagnostics:  I have reviewed and interpreted all pertinent imaging results/findings within the past 24 hours.

## 2022-11-29 NOTE — PLAN OF CARE
VSS . Afebrile . TC @ 21% 5 liters . No resp distress . He did not require freq suctioning . His  wife suctions him prn . Tolerating Peg tube feedings @10 ml/hr. Peg site cleaned and dsg chg done . Peg site at 5cm and benign. No abd distention or discomfort . Last BM 11/27 . Accurate I&O maintained and recorded . Voiding without difficulty in urinal. Sacral wound cleansed and new foam dsg applied to sacrum . Partial thickness wound is just below the sacrum , about nickel size with mild excoriation surrounding the break in tissue . Applied barrier cream to buttocks and left wound open to air and urged and assisted patient to reposition from left to right .  RL@ 100 ml/hr Via ML PIV  and continue antibiotic therapy . Wife voiced concern about having to stay in hospital a prolonged period of time for IV antibiotics and her wishes were to be able to go home with Mr. Bridges and have Home Health setup to administer meds . Reassured patient and his wife that his would be communicated . Educated patient and his wife on importance of good handwashing and other safety measures ,eg fall prevention and airway protection . Accuchecks done q 4 hrs and treated according to insulin sliding scale . Right thigh wound remains scabbed but has no signs of complications . Incision to left thigh HENRY with staples intact and healing well . Continue care   Problem: Adult Inpatient Plan of Care  Goal: Plan of Care Review  Outcome: Ongoing, Progressing     Problem: Adult Inpatient Plan of Care  Goal: Optimal Comfort and Wellbeing  Outcome: Ongoing, Progressing     Problem: Adult Inpatient Plan of Care  Goal: Absence of Hospital-Acquired Illness or Injury  Outcome: Ongoing, Progressing     Problem: Adult Inpatient Plan of Care  Goal: Readiness for Transition of Care  Outcome: Ongoing, Progressing     Problem: Diabetes Comorbidity  Goal: Blood Glucose Level Within Targeted Range  Outcome: Ongoing, Progressing     Problem: Infection  Goal:  Absence of Infection Signs and Symptoms  Outcome: Ongoing, Progressing     Problem: Impaired Wound Healing  Goal: Optimal Wound Healing  Outcome: Ongoing, Progressing     Problem: Skin Injury Risk Increased  Goal: Skin Health and Integrity  Outcome: Ongoing, Progressing

## 2022-11-29 NOTE — PROGRESS NOTES
[facility]  Infectious Disease  Progress Note    Patient Name: Cyrus Batres Jr.  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 Presented this admission for worsening SOB,  Found to have Staph epidermidis bacteremia, Pseudomonas bacteremia (found on both blood and tracheal aspirate culture), as well as Candida glabrata fungemia isolated from OSH.  Suspect sources are both port and from tracheostomy site.  TTE with no vegetations.  Ophthalmology performed eye exam - no findings of intraocular fungal infection.  Repeat CT showed persistent irregular fluid collections within submandibular space, slightly increased.  ENT had expressed mostly saliva at bedside morning of 11/29/22.  S/p port removal 11/28/22.  11/26/22 blood cultures NGTD.     Recommendations:  -Change meropenem to cefepime.  -Vancomycin, cefepime, and micafungin for 2 weeks, end date 12/11/22.  -If patient needs a new port, would complete antibiotic regimen before placement.  Discussed this with patient.  -OPAT note below    Bacteremia  Plan as above.    Outpatient Antibiotic Therapy Plan:    Please send referral to [facility].    1) Infection:   Candida glabrata fungemia  Pseudomonas aeruginosa bacteremia  Staphylococcus epidermidis bacteremia    2) Discharge Antibiotics:    Intravenous antimicrobials:  Vancomycin  Cefepime 2g q8h  Micafungin 100mg q24h    3) Therapy Duration:  2 weeks    Estimated end date of IV antibiotics: 12/11/22    4) Outpatient Weekly Labs:    Order the following labs to be  drawn on Mondays:   CBC  CMP   Vancomycin trough. Target 15-20    If discharged on vancomycin IV, order the following additional labs to be drawn on Thursdays:  CMP   Vancomycin trough. Target 15-20    If vancomycin trough is not at target (15-20) prior to discharge, schedule vancomycin trough to be drawn before their fourth outpatient dose.    5) Fax Lab Results to Infectious Diseases Provider: Dr javon ruiz    [facility] Fax Number: [phone]    6) Outpatient Infectious Diseases Follow-up    Follow-up appointment will be arranged by the ID clinic and will be found in the patient's appointments tab.    Prior to discharge, please ensure the patient's follow-up has been scheduled.    If there is still no follow-up scheduled prior to discharge, please send an EPIC message to Rebecca Maria in Infectious Diseases.            Thank you for your consult. I will sign off. Please contact us if you have any additional questions.    Javon Ruiz MD  Infectious Disease  [address] [facility]    Subjective:     Principal Problem:<principal problem not specified>    HPI: 73 yo male with advanced SCC of larynx/tongue (maintained on carbo/pem/5FU, last received 8/2022) s/p glossectomy/total pharyngectomy with flap on 11/9 transferred from [facility] for ENT evaluation. ID consulted for abx recs. While at [facility], pt was found to have bacteremia (pseudomonas, CONS) and Cglab fungemia. Trach cx site also with Pseudomonas. CTneck at OSH with irregular fluid collection c/f abscess along submandibular space and above tracheostomy site. Pt is currently on cefepime, vanco, and micafungin. ECHO pending. Pt reported increased drainage from trach site that started recently.     Interval History: NAEO, afebrile.  Patient can communicate by writing on tablet, feeling okay.  ENT had expressed some fluid from the trach site bedside.  Denies diarrhea.    Review of Systems   Constitutional:  Negative for chills and fever.   Respiratory:  Negative for  cough and shortness of breath.    Gastrointestinal:  Negative for diarrhea, nausea and vomiting.   Skin:  Positive for wound. Negative for rash.   Objective:     Vital Signs (Most Recent):  Temp: 98 °F (36.7 °C) (11/29/22 1539)  Pulse: 70 (11/29/22 1539)  Resp: 18 (11/29/22 1539)  BP: 129/71 (11/29/22 1539)  SpO2: 98 % (11/29/22 1539) Vital Signs (24h Range):  Temp:  [97.6 °F (36.4 °C)-98.3 °F (36.8 °C)] 98 °F (36.7 °C)  Pulse:  [65-87] 70  Resp:  [15-20] 18  SpO2:  [93 %-99 %] 98 %  BP: (110-140)/(60-71) 129/71     Weight: 62.1 kg (137 lb)  Body mass index is 22.11 kg/m².    Estimated Creatinine Clearance: 85 mL/min (based on SCr of 0.7 mg/dL).    Physical Exam  Constitutional:       General: He is not in acute distress.     Appearance: He is normal weight. He is not toxic-appearing or diaphoretic.   HENT:      Head: Normocephalic and atraumatic.      Mouth/Throat:      Mouth: Mucous membranes are moist.   Eyes:      General: No scleral icterus.        Right eye: No discharge.         Left eye: No discharge.   Neck:      Comments: -trach site without erythema/purulence/bleeding    Cardiovascular:      Rate and Rhythm: Normal rate and regular rhythm.   Pulmonary:      Effort: Pulmonary effort is normal.      Breath sounds: Normal breath sounds.   Abdominal:      General: Abdomen is flat. There is no distension.      Palpations: Abdomen is soft.      Comments: -PEG tube site c/d/i   Musculoskeletal:         General: No deformity.      Right lower leg: No edema.      Left lower leg: No edema.   Skin:     General: Skin is warm and dry.   Neurological:      Mental Status: He is alert and oriented to person, place, and time. Mental status is at baseline.   Psychiatric:         Mood and Affect: Mood normal.         Behavior: Behavior normal.         Thought Content: Thought content normal.         Judgment: Judgment normal.       Significant Labs: All pertinent labs within the past 24 hours have been  reviewed.    Significant Imaging: I have reviewed all pertinent imaging results/findings within the past 24 hours.

## 2022-11-29 NOTE — ASSESSMENT & PLAN NOTE
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 Presented this admission for worsening SOB,  Found to have Staph epidermidis bacteremia, Pseudomonas bacteremia (found on both blood and tracheal aspirate culture), as well as Candida glabrata fungemia isolated from OSH.  Suspect sources are both line and from tracheostomy site.  TTE with no vegetations.  Ophthalmology performed eye exam - no findings of intraocular fungal infection.  Repeat CT showed persistent irregular fluid collections within submandibular space, slightly increased.  ENT had expressed mostly saliva at bedside morning of 11/29/22.  S/p port removal 11/28/22.  11/26/22 blood cultures NGTD.     Recommendations:  -Change meropenem to cefepime.  -Vancomycin, cefepime, and micafungin for 2 weeks, end date 12/11/22.  -If patient needs a new port, would complete antibiotic regimen before placement.  Discussed this with patient.  -OPAT note below

## 2022-11-29 NOTE — SUBJECTIVE & OBJECTIVE
Interval History: NAEO, afebrile.  Patient can communicate by writing on tablet, feeling okay.  ENT had expressed some fluid from the trach site bedside.  Denies diarrhea.    Review of Systems   Constitutional:  Negative for chills and fever.   Respiratory:  Negative for cough and shortness of breath.    Gastrointestinal:  Negative for diarrhea, nausea and vomiting.   Skin:  Positive for wound. Negative for rash.   Objective:     Vital Signs (Most Recent):  Temp: 98 °F (36.7 °C) (11/29/22 1539)  Pulse: 70 (11/29/22 1539)  Resp: 18 (11/29/22 1539)  BP: 129/71 (11/29/22 1539)  SpO2: 98 % (11/29/22 1539) Vital Signs (24h Range):  Temp:  [97.6 °F (36.4 °C)-98.3 °F (36.8 °C)] 98 °F (36.7 °C)  Pulse:  [65-87] 70  Resp:  [15-20] 18  SpO2:  [93 %-99 %] 98 %  BP: (110-140)/(60-71) 129/71     Weight: 62.1 kg (137 lb)  Body mass index is 22.11 kg/m².    Estimated Creatinine Clearance: 85 mL/min (based on SCr of 0.7 mg/dL).    Physical Exam  Constitutional:       General: He is not in acute distress.     Appearance: He is normal weight. He is not toxic-appearing or diaphoretic.   HENT:      Head: Normocephalic and atraumatic.      Mouth/Throat:      Mouth: Mucous membranes are moist.   Eyes:      General: No scleral icterus.        Right eye: No discharge.         Left eye: No discharge.   Neck:      Comments: -trach site without erythema/purulence/bleeding    Cardiovascular:      Rate and Rhythm: Normal rate and regular rhythm.   Pulmonary:      Effort: Pulmonary effort is normal.      Breath sounds: Normal breath sounds.   Abdominal:      General: Abdomen is flat. There is no distension.      Palpations: Abdomen is soft.      Comments: -PEG tube site c/d/i   Musculoskeletal:         General: No deformity.      Right lower leg: No edema.      Left lower leg: No edema.   Skin:     General: Skin is warm and dry.   Neurological:      Mental Status: He is alert and oriented to person, place, and time. Mental status is at baseline.    Psychiatric:         Mood and Affect: Mood normal.         Behavior: Behavior normal.         Thought Content: Thought content normal.         Judgment: Judgment normal.       Significant Labs: All pertinent labs within the past 24 hours have been reviewed.    Significant Imaging: I have reviewed all pertinent imaging results/findings within the past 24 hours.

## 2022-11-29 NOTE — ASSESSMENT & PLAN NOTE
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    Currently with bacteremia and fungemia     ENT/HNS:  - Laryngectomy protocol   -Sign above bed + outside door stating patient is "neck breather" and "can not be intubated by mouth"   -Clean laryngectomy stoma as needed, or daily  - Keep tracheostomy in place     - OK to replace tracheostomy if dislodged, only in place for swelling/secretion assistance -- replaced 11/27  - Humidified O2 via trach collar  - Will follow up official CT read      Neuro:   - Pain: Multimodality PRN regimen initiated     Cardiac:  - HDS  - Page ENT before starting vasopressors  - H/O of paroxysmal a fib, will CTM      Pulmonary:   - PRN breathing treatments  - Humidified air per trach collar  - OK to remove tracheostomy for adequate suctioning     Renal:   - monitor Is and Os  - Cr stable     Infectious Disease:  - Leukocytosis and BC positive for pseudomonas -- now improved WBC  - ID consulted; appreciate recommendations   - TTE unremarkable    - CT neck read pending but appears to have fluid collection  - Will discuss drainage of fluid collection on CT with staff today   - Antibiotics per ID team; to end 12/11  Antibiotics (From admission, onward)    Start     Stop Route Frequency Ordered    11/27/22 2300  vancomycin 750 mg in dextrose 5 % 250 mL IVPB (ready to mix system)         -- IV Every 12 hours (non-standard times) 11/27/22 1139    11/26/22 1530  meropenem (MERREM) 2 g in sodium chloride 0.9% 100 mL IVPB         -- IV Every 8 hours (non-standard times) 11/26/22 1420    11/24/22 0900  mupirocin 2 % ointment         11/29 0859 Nasl 2 times daily 11/24/22 0319           Hematology/Oncology:  - H/H stable  - Lovenox DVT prophylaxis      FEN/GI  - Transaminitis and increased alk phos   - U/S of abdomen + liver dopppler     -- biliary sludge, otherwise " unremarkable; doppler wnl   - Medicine consulted; appreciate recs     - CRP, ESR elevated     - Autoimmune workup negative      - Hepatitis screen negative   - Continue trickle TFs    - Held  This AM for possible intervention  - Postoperative hypoparathyroidism   - Hypocalcemia on initial admission     - Continue tums QID, calcitriol   - Replace electrolytes as needed to keep K>4, Mg>2 --repleted K+ 11/27  - Bowel reg  - Protonix for GI prophylaxis, GERD  - STRICT nausea control ondansetron, phenergan       MSK  - Out of bed as tolerated  - STSG donor site healed     Dispo:  - Continue hospital stay, ongoing ID/IM workup for transaminitis and fungemia/septicemia

## 2022-11-30 LAB
ALBUMIN SERPL BCP-MCNC: 2.2 G/DL (ref 3.5–5.2)
ALP SERPL-CCNC: 1198 U/L (ref 55–135)
ALT SERPL W/O P-5'-P-CCNC: 131 U/L (ref 10–44)
ANION GAP SERPL CALC-SCNC: 6 MMOL/L (ref 8–16)
AST SERPL-CCNC: 82 U/L (ref 10–40)
BILIRUB SERPL-MCNC: 2.2 MG/DL (ref 0.1–1)
BUN SERPL-MCNC: 6 MG/DL (ref 8–23)
CALCIUM SERPL-MCNC: 7.6 MG/DL (ref 8.7–10.5)
CHLORIDE SERPL-SCNC: 100 MMOL/L (ref 95–110)
CO2 SERPL-SCNC: 26 MMOL/L (ref 23–29)
CREAT SERPL-MCNC: 0.9 MG/DL (ref 0.5–1.4)
EST. GFR  (NO RACE VARIABLE): >60 ML/MIN/1.73 M^2
FINAL PATHOLOGIC DIAGNOSIS: NORMAL
GLUCOSE SERPL-MCNC: 131 MG/DL (ref 70–110)
GROSS: NORMAL
Lab: NORMAL
POCT GLUCOSE: 104 MG/DL (ref 70–110)
POCT GLUCOSE: 114 MG/DL (ref 70–110)
POCT GLUCOSE: 132 MG/DL (ref 70–110)
POCT GLUCOSE: 160 MG/DL (ref 70–110)
POTASSIUM SERPL-SCNC: 3.6 MMOL/L (ref 3.5–5.1)
PROT SERPL-MCNC: 5.7 G/DL (ref 6–8.4)
SODIUM SERPL-SCNC: 132 MMOL/L (ref 136–145)
VANCOMYCIN TROUGH SERPL-MCNC: 23.9 UG/ML (ref 10–22)

## 2022-11-30 PROCEDURE — 25000003 PHARM REV CODE 250

## 2022-11-30 PROCEDURE — 63600175 PHARM REV CODE 636 W HCPCS: Mod: JG | Performed by: STUDENT IN AN ORGANIZED HEALTH CARE EDUCATION/TRAINING PROGRAM

## 2022-11-30 PROCEDURE — 63600175 PHARM REV CODE 636 W HCPCS

## 2022-11-30 PROCEDURE — 25000003 PHARM REV CODE 250: Performed by: STUDENT IN AN ORGANIZED HEALTH CARE EDUCATION/TRAINING PROGRAM

## 2022-11-30 PROCEDURE — 63600175 PHARM REV CODE 636 W HCPCS: Performed by: OTOLARYNGOLOGY

## 2022-11-30 PROCEDURE — 99900026 HC AIRWAY MAINTENANCE (STAT)

## 2022-11-30 PROCEDURE — 63600175 PHARM REV CODE 636 W HCPCS: Performed by: STUDENT IN AN ORGANIZED HEALTH CARE EDUCATION/TRAINING PROGRAM

## 2022-11-30 PROCEDURE — 36415 COLL VENOUS BLD VENIPUNCTURE: CPT | Performed by: OTOLARYNGOLOGY

## 2022-11-30 PROCEDURE — 36415 COLL VENOUS BLD VENIPUNCTURE: CPT | Performed by: STUDENT IN AN ORGANIZED HEALTH CARE EDUCATION/TRAINING PROGRAM

## 2022-11-30 PROCEDURE — 63700000 PHARM REV CODE 250 ALT 637 W/O HCPCS: Performed by: STUDENT IN AN ORGANIZED HEALTH CARE EDUCATION/TRAINING PROGRAM

## 2022-11-30 PROCEDURE — 80202 ASSAY OF VANCOMYCIN: CPT | Performed by: OTOLARYNGOLOGY

## 2022-11-30 PROCEDURE — 25000003 PHARM REV CODE 250: Performed by: OTOLARYNGOLOGY

## 2022-11-30 PROCEDURE — 20600001 HC STEP DOWN PRIVATE ROOM

## 2022-11-30 PROCEDURE — 31720 CLEARANCE OF AIRWAYS: CPT

## 2022-11-30 PROCEDURE — 99900035 HC TECH TIME PER 15 MIN (STAT)

## 2022-11-30 PROCEDURE — C9113 INJ PANTOPRAZOLE SODIUM, VIA: HCPCS | Performed by: STUDENT IN AN ORGANIZED HEALTH CARE EDUCATION/TRAINING PROGRAM

## 2022-11-30 PROCEDURE — 80053 COMPREHEN METABOLIC PANEL: CPT | Performed by: STUDENT IN AN ORGANIZED HEALTH CARE EDUCATION/TRAINING PROGRAM

## 2022-11-30 PROCEDURE — 94761 N-INVAS EAR/PLS OXIMETRY MLT: CPT

## 2022-11-30 RX ADMIN — SODIUM CHLORIDE, POTASSIUM CHLORIDE, SODIUM LACTATE AND CALCIUM CHLORIDE: 600; 310; 30; 20 INJECTION, SOLUTION INTRAVENOUS at 04:11

## 2022-11-30 RX ADMIN — CALCIUM CARBONATE (ANTACID) CHEW TAB 500 MG 1000 MG: 500 CHEW TAB at 09:11

## 2022-11-30 RX ADMIN — CEFEPIME 2 G: 2 INJECTION, POWDER, FOR SOLUTION INTRAVENOUS at 04:11

## 2022-11-30 RX ADMIN — CALCIUM CARBONATE (ANTACID) CHEW TAB 500 MG 1000 MG: 500 CHEW TAB at 05:11

## 2022-11-30 RX ADMIN — CALCIUM CARBONATE (ANTACID) CHEW TAB 500 MG 1000 MG: 500 CHEW TAB at 10:11

## 2022-11-30 RX ADMIN — VANCOMYCIN HYDROCHLORIDE 750 MG: 750 INJECTION, POWDER, LYOPHILIZED, FOR SOLUTION INTRAVENOUS at 12:11

## 2022-11-30 RX ADMIN — PANTOPRAZOLE SODIUM 40 MG: 40 INJECTION, POWDER, FOR SOLUTION INTRAVENOUS at 09:11

## 2022-11-30 RX ADMIN — CEFEPIME 2 G: 2 INJECTION, POWDER, FOR SOLUTION INTRAVENOUS at 05:11

## 2022-11-30 RX ADMIN — CALCITRIOL 0.25 MCG: 1 SOLUTION ORAL at 09:11

## 2022-11-30 RX ADMIN — CALCIUM CARBONATE (ANTACID) CHEW TAB 500 MG 1000 MG: 500 CHEW TAB at 03:11

## 2022-11-30 RX ADMIN — VANCOMYCIN HYDROCHLORIDE 1250 MG: 1.25 INJECTION, POWDER, LYOPHILIZED, FOR SOLUTION INTRAVENOUS at 03:11

## 2022-11-30 RX ADMIN — POLYETHYLENE GLYCOL 3350 17 G: 17 POWDER, FOR SOLUTION ORAL at 09:11

## 2022-11-30 RX ADMIN — MICAFUNGIN SODIUM 100 MG: 100 INJECTION, POWDER, LYOPHILIZED, FOR SOLUTION INTRAVENOUS at 08:11

## 2022-11-30 RX ADMIN — ENOXAPARIN SODIUM 40 MG: 40 INJECTION SUBCUTANEOUS at 04:11

## 2022-11-30 RX ADMIN — SODIUM CHLORIDE, POTASSIUM CHLORIDE, SODIUM LACTATE AND CALCIUM CHLORIDE: 600; 310; 30; 20 INJECTION, SOLUTION INTRAVENOUS at 09:11

## 2022-11-30 NOTE — PLAN OF CARE
Pt aaox4. No s/s of distress. No complaints. VSS. TF have been paused by ENT due to possibility of an intervention today. Pt aware. Bed in lowest position. Call light within reach. Pt tolerating meds via G-tube. Pt on LR@100cc/hr. NAEON. Great urine output.    Problem: Adult Inpatient Plan of Care  Goal: Plan of Care Review  Outcome: Ongoing, Progressing  Goal: Patient-Specific Goal (Individualized)  Outcome: Ongoing, Progressing  Goal: Absence of Hospital-Acquired Illness or Injury  Outcome: Ongoing, Progressing  Goal: Optimal Comfort and Wellbeing  Outcome: Ongoing, Progressing  Goal: Readiness for Transition of Care  Outcome: Ongoing, Progressing     Problem: Diabetes Comorbidity  Goal: Blood Glucose Level Within Targeted Range  Outcome: Ongoing, Progressing     Problem: Infection  Goal: Absence of Infection Signs and Symptoms  Outcome: Ongoing, Progressing     Problem: Impaired Wound Healing  Goal: Optimal Wound Healing  Outcome: Ongoing, Progressing     Problem: Fall Injury Risk  Goal: Absence of Fall and Fall-Related Injury  Outcome: Ongoing, Progressing     Problem: Skin Injury Risk Increased  Goal: Skin Health and Integrity  Outcome: Ongoing, Progressing

## 2022-11-30 NOTE — PLAN OF CARE
[facility]    HOME HEALTH ORDERS  FACE TO FACE ENCOUNTER    Patient Name: Cyrus Batres Jr.  YOB: 1950    Physician: Dr. Jesse James    Address: [address] [place] [address] / [facility] [address]    Phone Number: [phone]    Fax: [phone]    Encounter Date: 11/30/2022    Admit to Home Health    Diagnoses:   Active Hospital Problems    Diagnosis  POA    Fungemia [B49]  Yes    Bacteremia [R78.81]  Yes    Cholestatic hepatitis [K75.89]  Yes    Laryngeal cancer [C32.9]  Yes      Resolved Hospital Problems   No resolved problems to display.       Follow up Appointment: 1 week    I have seen and examined this patient face to face today. My clinical findings that support the need for the home health skilled services and home bound status are the following:  Weakness/numbness causing balance and gait disturbance due to malignancy/cancer and surgery making it taxing to leave home.  Medical restrictions requiring assistance of another human to leave home due to  unstable ambulation, post surgery monitoring and newly placed g-tube/ostomy and laryngectomy stoma.    Allergies:  Review of patient's allergies indicates:   Allergen Reactions    Lovastatin Itching       Diet: NPO.  Petamin 1.5 prebio 5 cartons/day via PEG.  Flush with free water before and after each feed.    Activities: Light activity only. No heavy lifting (>10lb), straining, stooping, exercising.       Nursing:   SN to admit within 24 hours post dc from hospital and complete comprehensive assessment including routine vital signs. Instruct on disease process and s/s of complications to report to MD. Review/verify medication list sent home with the patient at time of discharge  and instruct patient/caregiver as needed. Frequency may be adjusted depending on start of care date.  Nurse to instruct on stoma care and PEG tube feeding (if ordered), stoma suctioning and equipment, medication compliance and regimen, patient and family  understanding of disease process.    Please call our office if the patient develops any signs of infection at the surgical site, difficulty breathing, or any questions concerning patient.  If after hours, ask for resident on call for ENT service.    Recommended Frequency: 5 times q week 1, 3 times q week 2, 2 times for duration of episode.  Nursing visits as needed PRN.    Notify MD if SBP > 160 or < 90; DBP > 90 or < 50; HR > 120 or < 50; Temp > 101      CONSULTS:      Physical Therapy to evaluate and treat. Evaluate for home safety and equipment needs; Establish/upgrade home exercise program. Perform / instruct on therapeutic exercises, gait training, transfer training, and Range of Motion.  Work on shoulder abduction and trapezius strength.    Occupational Therapy to evaluate and treat. Evaluate home environment for safety and equipment needs. Perform/Instruct on transfers, ADL training, ROM, and therapeutic exercises.  Work on shoulder abduction and trapezius strength.    Speech Therapy  to evaluate and treat for language and swallowing.  Please assist patient with artificial larynx use and stoma education.      MISCELLANEOUS CARE:  PEG Care:  Instruct patient/caregiver to clean site.  Monitor skin integrity.     Laryngectomy Care: Laryngectomy care BID and PRN.  Clean mp tube daily with sterile saline or clean water. Suction prn.  Change mp tube every 3 months and PRN. Change HMEs when dirty.  Do not rinse HMEs.     Suction Supplies: Suction machine, suction tubing,  [place], 14F soft suction catheters, saline flushes, humidifier.    Patient currently with trach in laryngectomy stoma due to swelling and secretions.  Shiley size 6 cuffed trach ([id number]).  Change inner cannula daily and as needed.  Suction as needed.      Laryngectomy supplies available through [employer]: XtraFlow HMEs (#8867),  [employer] Mp Tube Brush (set of 6) 8mm (#4653), [employer]ox Tube Richardson (#8928),  [employer] Mp Tube Standard 9/55 (#3190) OR  9/36 (#7606).    [employer]: [phone]        WOUND CARE ORDERS  Keep incision clean and dry. Keep open to air. Okay to get wet. DO NOT scrub, soak, or submerge incision. Pat to dry.     Wet to dry dressing changes (saline dampened guaze) to left neck.    Medications: Review discharge medications with patient and family and provide education.  Contact ordering practitioner with any medication discrepancies.        Outpatient Infectious Diseases Follow-up     Follow-up appointment will be arranged by the ID clinic and will be found in the patient's appointments tab.     Prior to discharge, please ensure the patient's follow-up has been scheduled.    If there is still no follow-up scheduled prior to discharge, please send an EPIC message to Rebecca Maria in Infectious Diseases.        I certify that this patient is confined to home and needs nursing care, physical therapy, speech therapy and occupational therapy.    JODI Ledbetter, FNP-C  ENT- Division of Head and Neck Surgical Oncology  [phone]

## 2022-11-30 NOTE — PROGRESS NOTES
Pharmacokinetic Assessment Follow Up: IV Vancomycin    Vancomycin serum concentration assessment(s):    -Vancomycin level was drawn ~ 11 hrs from the preceding dose and can be used to guide therapy.  -A level of 23.9 mcg/mL is above goal 15-20 mcg/mL for bacteremia.  -Renal function stable.    Vancomycin Regimen Plan:    -Change vancomycin to 1250 mg IV Q24H (~ 20 mg/kg, est. AUC:BENI 550 mcg*hr/mL).  -Trough on 12/2 @ 14:00.  -Monitor for changes in renal function closely.     Drug levels (last 3 results):  Recent Labs   Lab Result Units 11/29/22  0926 11/30/22  1114   Vancomycin-Trough ug/mL 19.2 23.9*       Pharmacy will continue to follow and monitor vancomycin.    Please contact pharmacy at extension [phone] for questions regarding this assessment.    Thank you for the consult,   Abhishek Cavazos       Patient brief summary:  Cyrus Batres Jr. is a 72 y.o. male initiated on antimicrobial therapy with IV Vancomycin for treatment of bacteremia    Drug Allergies:   Review of patient's allergies indicates:   Allergen Reactions    Lovastatin Itching     Actual Body Weight:   62.1 kg    Renal Function:   Estimated Creatinine Clearance: 66.1 mL/min (based on SCr of 0.9 mg/dL).,     Dialysis Method (if applicable):  N/A    CBC (last 72 hours):  Recent Labs   Lab Result Units 11/28/22  0917 11/29/22  0239   WBC K/uL 5.47 4.45   Hemoglobin g/dL 8.1* 8.0*   Hematocrit % 25.5* 25.1*   Platelets K/uL 398 385   Gran % % 82.0* 73.9*   Lymph % % 7.0* 11.7*   Mono % % 7.0 6.1   Eosinophil % % 1.0 2.9   Basophil % % 1.0 1.1   Differential Method  Manual Automated       Metabolic Panel (last 72 hours):  Recent Labs   Lab Result Units 11/28/22  0443 11/29/22  0239 11/30/22  0329   Sodium mmol/L 136 133* 132*   Potassium mmol/L 3.5 3.5 3.6   Chloride mmol/L 104 102 100   CO2 mmol/L 25 22* 26   Glucose mg/dL 130* 134* 131*   BUN mg/dL 5* 5* 6*   Creatinine mg/dL 0.7 0.7 0.9   Albumin g/dL 1.8* 1.9* 2.2*   Total Bilirubin mg/dL 2.3* 2.0*  2.2*   Alkaline Phosphatase U/L 1,159* 1,095* 1,198*   AST U/L 86* 81* 82*   ALT U/L 122* 126* 131*       Vancomycin Administrations:  vancomycin given in the last 96 hours                     vancomycin 750 mg in dextrose 5 % 250 mL IVPB (ready to mix system) (mg) 750 mg New Bag 11/30/22 0056     750 mg New Bag 11/29/22 1204     750 mg New Bag 11/28/22 2344     750 mg New Bag  1011     750 mg New Bag 11/27/22 2234    vancomycin in dextrose 5 % 1 gram/250 mL IVPB 1,000 mg (mg) 1,000 mg New Bag 11/27/22 1108     1,000 mg New Bag 11/26/22 2300                    Microbiologic Results:  Microbiology Results (last 7 days)       Procedure Component Value Units Date/Time    Blood culture [[id number]] Collected: 11/26/22 0831    Order Status: Completed Specimen: Blood from Peripheral, Right Hand Updated: 11/30/22 1212     Blood Culture, Routine No Growth to date      No Growth to date      No Growth to date      No Growth to date      No Growth to date    Blood culture [[id number]] Collected: 11/26/22 0832    Order Status: Completed Specimen: Blood from Peripheral, Left Hand Updated: 11/30/22 1212     Blood Culture, Routine No Growth to date      No Growth to date      No Growth to date      No Growth to date      No Growth to date

## 2022-11-30 NOTE — PLAN OF CARE
Pt aox4 on trach collar room air, patient demonstrates understanding of POC.    Problem: Adult Inpatient Plan of Care  Goal: Plan of Care Review  Outcome: Ongoing, Progressing  Goal: Patient-Specific Goal (Individualized)  Outcome: Ongoing, Progressing  Goal: Absence of Hospital-Acquired Illness or Injury  Outcome: Ongoing, Progressing  Goal: Optimal Comfort and Wellbeing  Outcome: Ongoing, Progressing  Goal: Readiness for Transition of Care  Outcome: Ongoing, Progressing     Problem: Diabetes Comorbidity  Goal: Blood Glucose Level Within Targeted Range  Outcome: Ongoing, Progressing     Problem: Infection  Goal: Absence of Infection Signs and Symptoms  Outcome: Ongoing, Progressing     Problem: Impaired Wound Healing  Goal: Optimal Wound Healing  Outcome: Ongoing, Progressing     Problem: Fall Injury Risk  Goal: Absence of Fall and Fall-Related Injury  Outcome: Ongoing, Progressing     Problem: Skin Injury Risk Increased  Goal: Skin Health and Integrity  Outcome: Ongoing, Progressing

## 2022-11-30 NOTE — PROGRESS NOTES
Nael Carey - [facility]  Otorhinolaryngology-Head & Neck Surgery  Progress Note    Subjective:     Post-Op Info:  * No surgery found *      Hospital Day: 8     Interval History: NAEON. Area opened on the L with drainage, packed.     Medications:  Continuous Infusions:   lactated ringers 100 mL/hr at 11/29/22 0616     Scheduled Meds:   calcitrioL  0.25 mcg Per G Tube Daily    calcium carbonate  1,000 mg Per G Tube 5x Daily    enoxaparin  40 mg Subcutaneous Daily    meropenem (MERREM) IVPB  2 g Intravenous Q8H    micafungin (MYCAMINE) IVPB  100 mg Intravenous Q24H    mupirocin   Nasal BID    pantoprazole  40 mg Intravenous Daily    polyethylene glycol  17 g Per G Tube Daily    vancomycin (VANCOCIN) IVPB  750 mg Intravenous Q12H     PRN Meds:acetaminophen, dextrose 10%, dextrose 10%, glucagon (human recombinant), hydrALAZINE, insulin aspart U-100, ondansetron, oxyCODONE, promethazine (PHENERGAN) IVPB, senna-docusate 8.6-50 mg, sodium chloride 0.9%     Review of patient's allergies indicates:   Allergen Reactions    Lovastatin Itching     Objective:     Vital Signs (24h Range):  Temp:  [97.6 °F (36.4 °C)-98.4 °F (36.9 °C)] 97.9 °F (36.6 °C)  Pulse:  [64-87] 66  Resp:  [16-20] 20  SpO2:  [93 %-99 %] 93 %  BP: (110-140)/(60-71) 135/60       Lines/Drains/Airways       Drain  Duration                  Gastrostomy/Enterostomy LUQ -- days              Airway  Duration             Adult Surgical Airway Shiley Cuffed -- days         Laryngectomy (Do Not Remove) 11/09/22 Laryngectomy stoma 20 days              Peripheral Intravenous Line  Duration                  Peripheral IV - Single Lumen 11/28/22 1311 20 G;1 3/4 in Left;Anterior Forearm <1 day                    Physical Exam  NAD  Awake and alert  Mild facial edema   Trach within stoma, removed. Stoma appears healthy  Skin graft to midline neck overlying flap, at left superior portion small area of breakdown though without drainage and remains with adequate coverage  Small area  "of dehiscence right laterally, near area of STSG, packing removed. Unable to express any drainage, stable in appearance with no drainage  Left area with packing in place, removed. Yellow  drainage suctioned and repacked.   Intraoral flap is soft without fluctuance, secretions thin, clear intraorally  Tracheal secretions increased but clear     Significant Labs:  All pertinent labs from the last 24 hours have been reviewed.    Significant Diagnostics:  I have reviewed and interpreted all pertinent imaging results/findings within the past 24 hours.    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    Currently with bacteremia and fungemia     ENT/HNS:  - Laryngectomy protocol   -Sign above bed + outside door stating patient is "neck breather" and "can not be intubated by mouth"   -Clean laryngectomy stoma as needed, or daily  - Keep tracheostomy in place     - OK to replace tracheostomy if dislodged, only in place for swelling/secretion assistance -- replaced 11/27  - Humidified O2 via trach collar  - Will follow up official CT read      Neuro:   - Pain: Multimodality PRN regimen initiated     Cardiac:  - HDS  - Page ENT before starting vasopressors  - H/O of paroxysmal a fib, will CTM      Pulmonary:   - PRN breathing treatments  - Humidified air per trach collar  - OK to remove tracheostomy for adequate suctioning     Renal:   - monitor Is and Os  - Cr stable     Infectious Disease:  - Leukocytosis and BC positive for pseudomonas -- now improved WBC  - ID consulted; appreciate recommendations   - TTE unremarkable    - CT neck read pending but appears to have fluid collection  - Will discuss drainage of fluid collection on CT with staff today   - Antibiotics per ID team; to end 12/11  Antibiotics (From admission, onward)      Start     Stop Route " Frequency Ordered    11/27/22 2300  vancomycin 750 mg in dextrose 5 % 250 mL IVPB (ready to mix system)         -- IV Every 12 hours (non-standard times) 11/27/22 1139    11/26/22 1530  meropenem (MERREM) 2 g in sodium chloride 0.9% 100 mL IVPB         -- IV Every 8 hours (non-standard times) 11/26/22 1420    11/24/22 0900  mupirocin 2 % ointment         11/29 0859 Nasl 2 times daily 11/24/22 0319             Hematology/Oncology:  - H/H stable  - Lovenox DVT prophylaxis      FEN/GI  - Transaminitis and increased alk phos   - U/S of abdomen + liver dopppler     -- biliary sludge, otherwise unremarkable; doppler wnl   - Medicine consulted; appreciate recs     - CRP, ESR elevated     - Autoimmune workup negative      - Hepatitis screen negative   - Continue trickle TFs    - Held  This AM for possible intervention  - Postoperative hypoparathyroidism   - Hypocalcemia on initial admission     - Continue tums QID, calcitriol   - Replace electrolytes as needed to keep K>4, Mg>2 --repleted K+ 11/27  - Bowel reg  - Protonix for GI prophylaxis, GERD  - STRICT nausea control ondansetron, phenergan       MSK  - Out of bed as tolerated  - STSG donor site healed     Dispo:  - Continue hospital stay, ongoing ID/IM workup for transaminitis and fungemia/septicemia           Jo Ann Bryant MD  Otorhinolaryngology-Head & Neck Surgery  [facility]

## 2022-11-30 NOTE — RESPIRATORY THERAPY
RAPID RESPONSE RESPIRATORY THERAPY PROACTIVE NOTE           Time of visit: 941     Code Status: Full Code   : 1950  Bed: 1055/1055 A:   MRN: [id number]  Time spent at the bedside: < 15 min    SITUATION    Evaluated patient for: LDA Check     BACKGROUND    Patient has a past medical history of Hypothyroidism, PEG (percutaneous endoscopic gastrostomy) adjustment/replacement/removal, and Type 2 diabetes mellitus, without long-term current use of insulin.  Clinically Significant Surgical Hx: tracheostomy    24 Hours Vitals Range:  Temp:  [97 °F (36.1 °C)-98 °F (36.7 °C)]   Pulse:  [67-84]   Resp:  [17-20]   BP: (126-142)/(69-86)   SpO2:  [97 %-99 %]     Labs:    Recent Labs     22  0443 22  0239 22  0329    133* 132*   K 3.5 3.5 3.6    102 100   CO2 25 22* 26   CREATININE 0.7 0.7 0.9   * 134* 131*        No results for input(s): PH, PCO2, PO2, HCO3, POCSATURATED, BE in the last 72 hours.    ASSESSMENT/INTERVENTIONS  Laryngectomy pt. Pt on 5L 21% trach collar. Shiley size 6 cuffed trach in stoma. All supplies in room and signage in place. Extra cuffed trachs sizes 4 and 6 at bedside. ET tubes sizes 6, 7, and 8 at bedside.      Last VS   Temp: 97 °F (36.1 °C) (1137)  Pulse: 79 (1137)  Resp: 20 (1137)  BP: 139/74 (1137)  SpO2: 99 % (1137)      Extra trachs at bedside: cuffed 4 and 6.  Level of Consciousness: Level of Consciousness (AVPU): alert  Respiratory Effort: Respiratory Effort: Unlabored Expansion/Accessory Muscle Usage: Expansion/Accessory Muscles/Retractions: no use of accessory muscles  All Lung Field Breath Sounds: All Lung Fields Breath Sounds: Anterior:, Lateral:, coarse  O2 Device/Concentration: 5 L 21% TC.  Surgical airway: Cheli pt. Yes, Type: Shiley Size: 6, cuffed  Ambu at bedside: Ambu bag with the patient?: Yes, Adult Ambu     Active Orders   Respiratory Care    Oxygen Continuous     Frequency: Continuous     Number of Occurrences:  Until Specified     Order Questions:      Device type: Low flow      Device: Trach Collar      FiO2%: 28      Titrate O2 per Oxygen Titration Protocol: Yes      To maintain SpO2 goal of: >= 90%      Notify MD of: Inability to achieve desired SpO2; Sudden change in patient status and requires 20% increase in FiO2; Patient requires >60% FiO2    Routine tracheostomy care     Frequency: Q12H     Number of Occurrences: Until Specified    Tracheostomy replacement Once     Frequency: Once     Number of Occurrences: 1 Occurrences     Order Comments: Patient's laryngectomy stoma trach tube (6-0 Shiley cuffed) replace with new trach of same size 11/27  Please place additional 6-0 Shiley cuffed trach at bedside, MD to replace prn.         RECOMMENDATIONS    We recommend: RRT Recs: Continue POC per primary team.      FOLLOW-UP    Please call back the Rapid Response RT, Tacho Frederick RRT at x [phone] for any questions or concerns.

## 2022-12-01 LAB
ALBUMIN SERPL BCP-MCNC: 2.2 G/DL (ref 3.5–5.2)
ALP SERPL-CCNC: 1274 U/L (ref 55–135)
ALT SERPL W/O P-5'-P-CCNC: 132 U/L (ref 10–44)
ANION GAP SERPL CALC-SCNC: 8 MMOL/L (ref 8–16)
AST SERPL-CCNC: 90 U/L (ref 10–40)
BACTERIA BLD CULT: NORMAL
BACTERIA BLD CULT: NORMAL
BASOPHILS # BLD AUTO: 0.02 K/UL (ref 0–0.2)
BASOPHILS # BLD AUTO: 0.02 K/UL (ref 0–0.2)
BASOPHILS NFR BLD: 0.4 % (ref 0–1.9)
BASOPHILS NFR BLD: 0.4 % (ref 0–1.9)
BILIRUB SERPL-MCNC: 2 MG/DL (ref 0.1–1)
BUN SERPL-MCNC: 9 MG/DL (ref 8–23)
CALCIUM SERPL-MCNC: 7.6 MG/DL (ref 8.7–10.5)
CHLORIDE SERPL-SCNC: 102 MMOL/L (ref 95–110)
CO2 SERPL-SCNC: 22 MMOL/L (ref 23–29)
CREAT SERPL-MCNC: 0.8 MG/DL (ref 0.5–1.4)
DIFFERENTIAL METHOD: ABNORMAL
DIFFERENTIAL METHOD: ABNORMAL
EOSINOPHIL # BLD AUTO: 0.2 K/UL (ref 0–0.5)
EOSINOPHIL # BLD AUTO: 0.2 K/UL (ref 0–0.5)
EOSINOPHIL NFR BLD: 2.7 % (ref 0–8)
EOSINOPHIL NFR BLD: 2.7 % (ref 0–8)
ERYTHROCYTE [DISTWIDTH] IN BLOOD BY AUTOMATED COUNT: 15.8 % (ref 11.5–14.5)
ERYTHROCYTE [DISTWIDTH] IN BLOOD BY AUTOMATED COUNT: 15.8 % (ref 11.5–14.5)
EST. GFR  (NO RACE VARIABLE): >60 ML/MIN/1.73 M^2
GLUCOSE SERPL-MCNC: 190 MG/DL (ref 70–110)
HCT VFR BLD AUTO: 31.6 % (ref 40–54)
HCT VFR BLD AUTO: 31.6 % (ref 40–54)
HGB BLD-MCNC: 9.9 G/DL (ref 14–18)
HGB BLD-MCNC: 9.9 G/DL (ref 14–18)
IMM GRANULOCYTES # BLD AUTO: 0.07 K/UL (ref 0–0.04)
IMM GRANULOCYTES # BLD AUTO: 0.07 K/UL (ref 0–0.04)
IMM GRANULOCYTES NFR BLD AUTO: 1.3 % (ref 0–0.5)
IMM GRANULOCYTES NFR BLD AUTO: 1.3 % (ref 0–0.5)
LYMPHOCYTES # BLD AUTO: 0.6 K/UL (ref 1–4.8)
LYMPHOCYTES # BLD AUTO: 0.6 K/UL (ref 1–4.8)
LYMPHOCYTES NFR BLD: 10.3 % (ref 18–48)
LYMPHOCYTES NFR BLD: 10.3 % (ref 18–48)
MCH RBC QN AUTO: 29.6 PG (ref 27–31)
MCH RBC QN AUTO: 29.6 PG (ref 27–31)
MCHC RBC AUTO-ENTMCNC: 31.3 G/DL (ref 32–36)
MCHC RBC AUTO-ENTMCNC: 31.3 G/DL (ref 32–36)
MCV RBC AUTO: 95 FL (ref 82–98)
MCV RBC AUTO: 95 FL (ref 82–98)
MONOCYTES # BLD AUTO: 0.3 K/UL (ref 0.3–1)
MONOCYTES # BLD AUTO: 0.3 K/UL (ref 0.3–1)
MONOCYTES NFR BLD: 5.2 % (ref 4–15)
MONOCYTES NFR BLD: 5.2 % (ref 4–15)
NEUTROPHILS # BLD AUTO: 4.5 K/UL (ref 1.8–7.7)
NEUTROPHILS # BLD AUTO: 4.5 K/UL (ref 1.8–7.7)
NEUTROPHILS NFR BLD: 80.1 % (ref 38–73)
NEUTROPHILS NFR BLD: 80.1 % (ref 38–73)
NRBC BLD-RTO: 0 /100 WBC
NRBC BLD-RTO: 0 /100 WBC
PLATELET # BLD AUTO: 380 K/UL (ref 150–450)
PLATELET # BLD AUTO: 380 K/UL (ref 150–450)
PMV BLD AUTO: 10.3 FL (ref 9.2–12.9)
PMV BLD AUTO: 10.3 FL (ref 9.2–12.9)
POCT GLUCOSE: 167 MG/DL (ref 70–110)
POCT GLUCOSE: 211 MG/DL (ref 70–110)
POCT GLUCOSE: 228 MG/DL (ref 70–110)
POCT GLUCOSE: 234 MG/DL (ref 70–110)
POTASSIUM SERPL-SCNC: 3.7 MMOL/L (ref 3.5–5.1)
PROT SERPL-MCNC: 5.9 G/DL (ref 6–8.4)
RBC # BLD AUTO: 3.34 M/UL (ref 4.6–6.2)
RBC # BLD AUTO: 3.34 M/UL (ref 4.6–6.2)
SODIUM SERPL-SCNC: 132 MMOL/L (ref 136–145)
WBC # BLD AUTO: 5.56 K/UL (ref 3.9–12.7)
WBC # BLD AUTO: 5.56 K/UL (ref 3.9–12.7)

## 2022-12-01 PROCEDURE — 63600175 PHARM REV CODE 636 W HCPCS: Performed by: STUDENT IN AN ORGANIZED HEALTH CARE EDUCATION/TRAINING PROGRAM

## 2022-12-01 PROCEDURE — 80053 COMPREHEN METABOLIC PANEL: CPT | Performed by: STUDENT IN AN ORGANIZED HEALTH CARE EDUCATION/TRAINING PROGRAM

## 2022-12-01 PROCEDURE — 63600175 PHARM REV CODE 636 W HCPCS: Performed by: OTOLARYNGOLOGY

## 2022-12-01 PROCEDURE — C9113 INJ PANTOPRAZOLE SODIUM, VIA: HCPCS | Performed by: STUDENT IN AN ORGANIZED HEALTH CARE EDUCATION/TRAINING PROGRAM

## 2022-12-01 PROCEDURE — 36573 INSJ PICC RS&I 5 YR+: CPT

## 2022-12-01 PROCEDURE — 94761 N-INVAS EAR/PLS OXIMETRY MLT: CPT

## 2022-12-01 PROCEDURE — C1751 CATH, INF, PER/CENT/MIDLINE: HCPCS

## 2022-12-01 PROCEDURE — 63600175 PHARM REV CODE 636 W HCPCS

## 2022-12-01 PROCEDURE — 27000221 HC OXYGEN, UP TO 24 HOURS

## 2022-12-01 PROCEDURE — 36415 COLL VENOUS BLD VENIPUNCTURE: CPT | Performed by: STUDENT IN AN ORGANIZED HEALTH CARE EDUCATION/TRAINING PROGRAM

## 2022-12-01 PROCEDURE — 27200966 HC CLOSED SUCTION SYSTEM

## 2022-12-01 PROCEDURE — A4216 STERILE WATER/SALINE, 10 ML: HCPCS | Performed by: OTOLARYNGOLOGY

## 2022-12-01 PROCEDURE — 25000003 PHARM REV CODE 250: Performed by: STUDENT IN AN ORGANIZED HEALTH CARE EDUCATION/TRAINING PROGRAM

## 2022-12-01 PROCEDURE — 63600175 PHARM REV CODE 636 W HCPCS: Mod: JG | Performed by: STUDENT IN AN ORGANIZED HEALTH CARE EDUCATION/TRAINING PROGRAM

## 2022-12-01 PROCEDURE — 20600001 HC STEP DOWN PRIVATE ROOM

## 2022-12-01 PROCEDURE — 76937 US GUIDE VASCULAR ACCESS: CPT

## 2022-12-01 PROCEDURE — 25000003 PHARM REV CODE 250: Performed by: OTOLARYNGOLOGY

## 2022-12-01 PROCEDURE — 63700000 PHARM REV CODE 250 ALT 637 W/O HCPCS: Performed by: STUDENT IN AN ORGANIZED HEALTH CARE EDUCATION/TRAINING PROGRAM

## 2022-12-01 PROCEDURE — 99900026 HC AIRWAY MAINTENANCE (STAT)

## 2022-12-01 PROCEDURE — 99900035 HC TECH TIME PER 15 MIN (STAT)

## 2022-12-01 PROCEDURE — 85025 COMPLETE CBC W/AUTO DIFF WBC: CPT | Performed by: STUDENT IN AN ORGANIZED HEALTH CARE EDUCATION/TRAINING PROGRAM

## 2022-12-01 RX ORDER — SODIUM CHLORIDE 0.9 % (FLUSH) 0.9 %
10 SYRINGE (ML) INJECTION
Status: DISCONTINUED | OUTPATIENT
Start: 2022-12-01 | End: 2022-12-13 | Stop reason: HOSPADM

## 2022-12-01 RX ORDER — SODIUM CHLORIDE 0.9 % (FLUSH) 0.9 %
10 SYRINGE (ML) INJECTION EVERY 6 HOURS
Status: DISCONTINUED | OUTPATIENT
Start: 2022-12-01 | End: 2022-12-13 | Stop reason: HOSPADM

## 2022-12-01 RX ADMIN — INSULIN ASPART 4 UNITS: 100 INJECTION, SOLUTION INTRAVENOUS; SUBCUTANEOUS at 06:12

## 2022-12-01 RX ADMIN — CALCIUM CARBONATE (ANTACID) CHEW TAB 500 MG 1000 MG: 500 CHEW TAB at 09:12

## 2022-12-01 RX ADMIN — CEFEPIME 2 G: 2 INJECTION, POWDER, FOR SOLUTION INTRAVENOUS at 03:12

## 2022-12-01 RX ADMIN — CALCIUM CARBONATE (ANTACID) CHEW TAB 500 MG 1000 MG: 500 CHEW TAB at 10:12

## 2022-12-01 RX ADMIN — SODIUM CHLORIDE, POTASSIUM CHLORIDE, SODIUM LACTATE AND CALCIUM CHLORIDE: 600; 310; 30; 20 INJECTION, SOLUTION INTRAVENOUS at 11:12

## 2022-12-01 RX ADMIN — VANCOMYCIN HYDROCHLORIDE 1250 MG: 1.25 INJECTION, POWDER, LYOPHILIZED, FOR SOLUTION INTRAVENOUS at 02:12

## 2022-12-01 RX ADMIN — CEFEPIME 2 G: 2 INJECTION, POWDER, FOR SOLUTION INTRAVENOUS at 08:12

## 2022-12-01 RX ADMIN — Medication 10 ML: at 05:12

## 2022-12-01 RX ADMIN — PANTOPRAZOLE SODIUM 40 MG: 40 INJECTION, POWDER, FOR SOLUTION INTRAVENOUS at 08:12

## 2022-12-01 RX ADMIN — ENOXAPARIN SODIUM 40 MG: 40 INJECTION SUBCUTANEOUS at 05:12

## 2022-12-01 RX ADMIN — Medication 10 ML: at 12:12

## 2022-12-01 RX ADMIN — CALCIUM CARBONATE (ANTACID) CHEW TAB 500 MG 1000 MG: 500 CHEW TAB at 02:12

## 2022-12-01 RX ADMIN — MICAFUNGIN SODIUM 100 MG: 100 INJECTION, POWDER, LYOPHILIZED, FOR SOLUTION INTRAVENOUS at 08:12

## 2022-12-01 RX ADMIN — CALCIUM CARBONATE (ANTACID) CHEW TAB 500 MG 1000 MG: 500 CHEW TAB at 05:12

## 2022-12-01 RX ADMIN — INSULIN ASPART 1 UNITS: 100 INJECTION, SOLUTION INTRAVENOUS; SUBCUTANEOUS at 12:12

## 2022-12-01 RX ADMIN — CEFEPIME 2 G: 2 INJECTION, POWDER, FOR SOLUTION INTRAVENOUS at 12:12

## 2022-12-01 RX ADMIN — CALCITRIOL 0.25 MCG: 1 SOLUTION ORAL at 08:12

## 2022-12-01 RX ADMIN — INSULIN ASPART 4 UNITS: 100 INJECTION, SOLUTION INTRAVENOUS; SUBCUTANEOUS at 04:12

## 2022-12-01 NOTE — PROGRESS NOTES
Nael Carey - [facility]  Otorhinolaryngology-Head & Neck Surgery  Progress Note    Subjective:     Post-Op Info:  * No surgery found *      Hospital Day: 9     Interval History: NAEON. Tolerating Tfs at 30    Medications:  Continuous Infusions:   lactated ringers 100 mL/hr at 11/30/22 2140     Scheduled Meds:   calcitrioL  0.25 mcg Per G Tube Daily    calcium carbonate  1,000 mg Per G Tube 5x Daily    ceFEPime (MAXIPIME) IVPB EXTENDED INFUSION  2 g Intravenous Q8H    enoxaparin  40 mg Subcutaneous Daily    micafungin (MYCAMINE) IVPB  100 mg Intravenous Q24H    pantoprazole  40 mg Intravenous Daily    polyethylene glycol  17 g Per G Tube Daily    vancomycin (VANCOCIN) IVPB  1,250 mg Intravenous Q24H     PRN Meds:acetaminophen, dextrose 10%, dextrose 10%, glucagon (human recombinant), hydrALAZINE, insulin aspart U-100, ondansetron, oxyCODONE, promethazine (PHENERGAN) IVPB, senna-docusate 8.6-50 mg, sodium chloride 0.9%     Review of patient's allergies indicates:   Allergen Reactions    Lovastatin Itching     Objective:     Vital Signs (24h Range):  Temp:  [96.2 °F (35.7 °C)-98.1 °F (36.7 °C)] 98.1 °F (36.7 °C)  Pulse:  [70-88] 76  Resp:  [18-20] 18  SpO2:  [95 %-99 %] 97 %  BP: (118-143)/(68-86) 123/68       Lines/Drains/Airways       Drain  Duration                  Gastrostomy/Enterostomy LUQ -- days              Airway  Duration             Adult Surgical Airway Shiley Cuffed -- days         Laryngectomy (Do Not Remove) 11/09/22 Laryngectomy stoma 22 days              Peripheral Intravenous Line  Duration                  Peripheral IV - Single Lumen 11/28/22 1311 20 G;1 3/4 in Left;Anterior Forearm 2 days                    Physical Exam  NAD  Awake and alert  Mild facial edema   Trach within stoma, removed. Stoma appears healthy  Skin graft to midline neck overlying flap, at left superior portion small area of breakdown though without drainage and remains with adequate coverage  Small area of dehiscence left  "laterally, near area of STSG but dry  Left area with packing in place, removed. 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    Currently with bacteremia and fungemia, port removed and ID recommending IV antibiotics at home, so will place PICC line.     ENT/HNS:  - Laryngectomy protocol   -Sign above bed + outside door stating patient is "neck breather" and "can not be intubated by mouth"   -Clean laryngectomy stoma as needed, or daily  - Keep tracheostomy in place     - OK to replace tracheostomy if dislodged, only in place for swelling/secretion assistance -- replaced 11/27  - Humidified O2 via trach collar  - CT with fluid collection, will continue packing changes      Neuro:   - Pain: Multimodality PRN regimen initiated     Cardiac:  - HDS  - Page ENT before starting vasopressors  - H/O of paroxysmal a fib, will CTM      Pulmonary:   - PRN breathing treatments  - Humidified air per trach collar  - OK to remove tracheostomy for adequate suctioning     Renal:   - monitor Is and Os  - Cr stable     Infectious Disease:  - Leukocytosis and BC positive for pseudomonas -- now improved WBC  - ID consulted; appreciate recommendations   - TTE unremarkable    - Continue daily packing changes   - Antibiotics per ID team; " to end 12/11; will place PICC line today  Antibiotics (From admission, onward)    Start     Stop Route Frequency Ordered    11/30/22 1500  vancomycin 1.25 g in dextrose 5% 250 mL IVPB (ready to mix)         -- IV Every 24 hours (non-standard times) 11/30/22 1220    11/29/22 2200  cefepime in dextrose 5 % IVPB 2 g         -- IV Every 8 hours (non-standard times) 11/29/22 1545    11/24/22 0900  mupirocin 2 % ointment         11/29 0859 Nasl 2 times daily 11/24/22 0319           Hematology/Oncology:  - H/H stable  - Lovenox DVT prophylaxis      FEN/GI  - Transaminitis and increased alk phos   - U/S of abdomen + liver dopppler     -- biliary sludge, otherwise unremarkable; doppler wnl   - Medicine consulted; appreciate recs     - CRP, ESR elevated     - Autoimmune workup negative      - Hepatitis screen negative   - Continue advancing TFs to goal  - Postoperative hypoparathyroidism   - Hypocalcemia on initial admission     - Continue tums QID, calcitriol   - Replace electrolytes as needed to keep K>4, Mg>2 --repleted K+ 11/27  - Bowel reg  - Protonix for GI prophylaxis, GERD  - STRICT nausea control ondansetron, phenergan       MSK  - Out of bed as tolerated  - STSG donor site healed     Dispo:  - Continue hospital stay, pending arrangement of  for tube feeds, trach supplies, and antibiotics          Jo Ann Bryant MD  Otorhinolaryngology-Head & Neck Surgery  [facility]

## 2022-12-01 NOTE — PLAN OF CARE
Pt aaox4. No s/s of distress. No complaints. VSS. Bed in lowest position. Call light within reach. Pt has TF@30cc/hr. Goal is 40. Pt is on room air w/ humidifier via t-collar. Tolerating TF w/ no residual. NAEON. Will continue POC.    Problem: Adult Inpatient Plan of Care  Goal: Plan of Care Review  Outcome: Ongoing, Progressing  Goal: Patient-Specific Goal (Individualized)  Outcome: Ongoing, Progressing  Goal: Absence of Hospital-Acquired Illness or Injury  Outcome: Ongoing, Progressing  Goal: Optimal Comfort and Wellbeing  Outcome: Ongoing, Progressing  Goal: Readiness for Transition of Care  Outcome: Ongoing, Progressing     Problem: Diabetes Comorbidity  Goal: Blood Glucose Level Within Targeted Range  Outcome: Ongoing, Progressing     Problem: Infection  Goal: Absence of Infection Signs and Symptoms  Outcome: Ongoing, Progressing     Problem: Impaired Wound Healing  Goal: Optimal Wound Healing  Outcome: Ongoing, Progressing     Problem: Fall Injury Risk  Goal: Absence of Fall and Fall-Related Injury  Outcome: Ongoing, Progressing     Problem: Skin Injury Risk Increased  Goal: Skin Health and Integrity  Outcome: Ongoing, Progressing

## 2022-12-01 NOTE — PROGRESS NOTES
Nael Carey - [facility]  Otorhinolaryngology-Head & Neck Surgery  Progress Note    Subjective:     Post-Op Info:  * No surgery found *      Hospital Day: 9     Interval History: No changes overnight. Patient reports feeling about the same, tolerating advancement of his tube feeds.    Medications:  Continuous Infusions:   lactated ringers 100 mL/hr at 11/30/22 2140     Scheduled Meds:   calcitrioL  0.25 mcg Per G Tube Daily    calcium carbonate  1,000 mg Per G Tube 5x Daily    ceFEPime (MAXIPIME) IVPB EXTENDED INFUSION  2 g Intravenous Q8H    enoxaparin  40 mg Subcutaneous Daily    micafungin (MYCAMINE) IVPB  100 mg Intravenous Q24H    pantoprazole  40 mg Intravenous Daily    polyethylene glycol  17 g Per G Tube Daily    vancomycin (VANCOCIN) IVPB  1,250 mg Intravenous Q24H     PRN Meds:acetaminophen, dextrose 10%, dextrose 10%, glucagon (human recombinant), hydrALAZINE, insulin aspart U-100, ondansetron, oxyCODONE, promethazine (PHENERGAN) IVPB, senna-docusate 8.6-50 mg, sodium chloride 0.9%     Review of patient's allergies indicates:   Allergen Reactions    Lovastatin Itching     Objective:     Vital Signs (24h Range):  Temp:  [96.2 °F (35.7 °C)-98.1 °F (36.7 °C)] 98.1 °F (36.7 °C)  Pulse:  [70-88] 76  Resp:  [18-20] 18  SpO2:  [95 %-99 %] 97 %  BP: (118-143)/(68-86) 123/68       Lines/Drains/Airways       Drain  Duration                  Gastrostomy/Enterostomy LUQ -- days              Airway  Duration             Adult Surgical Airway Shiley Cuffed -- days         Laryngectomy (Do Not Remove) 11/09/22 Laryngectomy stoma 22 days              Peripheral Intravenous Line  Duration                  Peripheral IV - Single Lumen 11/28/22 1311 20 G;1 3/4 in Left;Anterior Forearm 2 days                    Physical Exam  NAD  Awake and alert  Mild facial edema   Trach within stoma, removed. Stoma appears healthy  Skin graft to midline neck overlying flap, at left superior portion small area of breakdown though without  "drainage and remains with adequate coverage  Small area of dehiscence laterally, near area of STSG, packing removed. Unable to express any drainage, stable in appearance  Left area with packing in place, removed. Fewer secretions today.  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    Currently with bacteremia and fungemia, port removed and ID recommending IV antibiotics at home, so will place PICC line.     ENT/HNS:  - Laryngectomy protocol   -Sign above bed + outside door stating patient is "neck breather" and "can not be intubated by mouth"   -Clean laryngectomy stoma as needed, or daily  - Keep tracheostomy in place     - OK to replace tracheostomy if dislodged, only in place for swelling/secretion assistance -- replaced 11/27  - Humidified O2 via trach collar  - Will follow up official CT read      Neuro:   - Pain: Multimodality PRN regimen initiated     Cardiac:  - HDS  - Page ENT before starting vasopressors  - H/O of paroxysmal a fib, will CTM      Pulmonary:   - PRN breathing treatments  - Humidified air per trach collar  - OK to remove tracheostomy for adequate suctioning     Renal:   - monitor Is and Os  - Cr stable     Infectious Disease:  - Leukocytosis and BC positive for pseudomonas -- now improved WBC  - ID consulted; " appreciate recommendations   - TTE unremarkable    - Continue daily packing changes   - Antibiotics per ID team; to end 12/11; will place PICC line today  Antibiotics (From admission, onward)    Start     Stop Route Frequency Ordered    11/30/22 1500  vancomycin 1.25 g in dextrose 5% 250 mL IVPB (ready to mix)         -- IV Every 24 hours (non-standard times) 11/30/22 1220    11/29/22 2200  cefepime in dextrose 5 % IVPB 2 g         -- IV Every 8 hours (non-standard times) 11/29/22 1545    11/24/22 0900  mupirocin 2 % ointment         11/29 0859 Nasl 2 times daily 11/24/22 0319           Hematology/Oncology:  - H/H stable  - Lovenox DVT prophylaxis      FEN/GI  - Transaminitis and increased alk phos   - U/S of abdomen + liver dopppler     -- biliary sludge, otherwise unremarkable; doppler wnl   - Medicine consulted; appreciate recs     - CRP, ESR elevated     - Autoimmune workup negative      - Hepatitis screen negative   - Continue advancing TFs to goal  - Postoperative hypoparathyroidism   - Hypocalcemia on initial admission     - Continue tums QID, calcitriol   - Replace electrolytes as needed to keep K>4, Mg>2 --repleted K+ 11/27  - Bowel reg  - Protonix for GI prophylaxis, GERD  - STRICT nausea control ondansetron, phenergan       MSK  - Out of bed as tolerated  - STSG donor site healed     Dispo:  - Continue hospital stay, pending arrangement of  for tube feeds, trach supplies, and antibiotics          Henna Mckoy MD  Otorhinolaryngology-Head & Neck Surgery  [facility]

## 2022-12-01 NOTE — ASSESSMENT & PLAN NOTE
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    Currently with bacteremia and fungemia, port removed and ID recommending IV antibiotics at home, so will place PICC line.     ENT/HNS:  - Laryngectomy protocol   -Sign above bed + outside door stating patient is "neck breather" and "can not be intubated by mouth"   -Clean laryngectomy stoma as needed, or daily  - Keep tracheostomy in place     - OK to replace tracheostomy if dislodged, only in place for swelling/secretion assistance -- replaced 11/27  - Humidified O2 via trach collar  - CT with fluid collection, will continue packing changes      Neuro:   - Pain: Multimodality PRN regimen initiated     Cardiac:  - HDS  - Page ENT before starting vasopressors  - H/O of paroxysmal a fib, will CTM      Pulmonary:   - PRN breathing treatments  - Humidified air per trach collar  - OK to remove tracheostomy for adequate suctioning     Renal:   - monitor Is and Os  - Cr stable     Infectious Disease:  - Leukocytosis and BC positive for pseudomonas -- now improved WBC  - ID consulted; appreciate recommendations   - TTE unremarkable    - Continue daily packing changes   - Antibiotics per ID team; to end 12/11; will place PICC line today  Antibiotics (From admission, onward)    Start     Stop Route Frequency Ordered    11/30/22 1500  vancomycin 1.25 g in dextrose 5% 250 mL IVPB (ready to mix)         -- IV Every 24 hours (non-standard times) 11/30/22 1220    11/29/22 2200  cefepime in dextrose 5 % IVPB 2 g         -- IV Every 8 hours (non-standard times) 11/29/22 1545    11/24/22 0900  mupirocin 2 % ointment         11/29 0859 Nasl 2 times daily 11/24/22 0319           Hematology/Oncology:  - H/H stable  - Lovenox DVT prophylaxis      FEN/GI  - Transaminitis and increased alk phos   - U/S of abdomen + liver dopppler     -- biliary sludge, otherwise " unremarkable; doppler wnl   - Medicine consulted; appreciate recs     - CRP, ESR elevated     - Autoimmune workup negative      - Hepatitis screen negative   - Continue advancing TFs to goal  - Postoperative hypoparathyroidism   - Hypocalcemia on initial admission     - Continue tums QID, calcitriol   - Replace electrolytes as needed to keep K>4, Mg>2 --repleted K+ 11/27  - Bowel reg  - Protonix for GI prophylaxis, GERD  - STRICT nausea control ondansetron, phenergan       MSK  - Out of bed as tolerated  - STSG donor site healed     Dispo:  - Continue hospital stay, pending arrangement of HH for tube feeds, trach supplies, and antibiotics

## 2022-12-01 NOTE — RESPIRATORY THERAPY
RAPID RESPONSE RESPIRATORY THERAPY PROACTIVE NOTE           Time of visit: 840     Code Status: Full Code   : 1950  Bed: 1055/1055 A:   MRN: [id number]  Time spent at the bedside: < 15 min    SITUATION    Evaluated patient for: LDA Check     BACKGROUND    Patient has a past medical history of Hypothyroidism, PEG (percutaneous endoscopic gastrostomy) adjustment/replacement/removal, and Type 2 diabetes mellitus, without long-term current use of insulin.  Clinically Significant Surgical Hx: tracheostomy  laryngectomy    24 Hours Vitals Range:  Temp:  [96.2 °F (35.7 °C)-98.1 °F (36.7 °C)]   Pulse:  [70-88]   Resp:  [17-20]   BP: (118-143)/(66-77)   SpO2:  [95 %-99 %]     Labs:    Recent Labs     22  0239 22  0329 22  0625   * 132* 132*   K 3.5 3.6 3.7    100 102   CO2 22* 26 22*   CREATININE 0.7 0.9 0.8   * 131* 190*        No results for input(s): PH, PCO2, PO2, HCO3, POCSATURATED, BE in the last 72 hours.    ASSESSMENT/INTERVENTIONS  Pt in bed resting, suctioning requested and provided by this RT. No further concern expressed by pt.      Last VS   Temp: 96.3 °F (35.7 °C) (1135)  Pulse: 78 (1135)  Resp: 17 (1135)  BP: 125/66 (1135)  SpO2: 98 % (1135)      Extra trachs at bedside: 6.0 & 4.0 Shiley Cuffed  Level of Consciousness: Level of Consciousness (AVPU): alert  Respiratory Effort: Respiratory Effort: Normal, Unlabored Expansion/Accessory Muscle Usage: Expansion/Accessory Muscles/Retractions: no use of accessory muscles, no retractions, expansion symmetric  All Lung Field Breath Sounds: All Lung Fields Breath Sounds: Anterior:, Lateral:, coarse  O2 Device/Concentration: trach collar 5L/21%  Surgical airway: Yes, Type: Shiley Size: 6, cuffed in laryngectomy stoma  Ambu at bedside: Ambu bag with the patient?: Yes, Adult Ambu     Active Orders   Respiratory Care    Oxygen Continuous     Frequency: Continuous     Number of Occurrences: Until  Specified     Order Questions:      Device type: Low flow      Device: Trach Collar      FiO2%: 28      Titrate O2 per Oxygen Titration Protocol: Yes      To maintain SpO2 goal of: >= 90%      Notify MD of: Inability to achieve desired SpO2; Sudden change in patient status and requires 20% increase in FiO2; Patient requires >60% FiO2    Routine tracheostomy care     Frequency: Q12H     Number of Occurrences: Until Specified    Tracheostomy replacement Once     Frequency: Once     Number of Occurrences: 1 Occurrences     Order Comments: Patient's laryngectomy stoma trach tube (6-0 Shiley cuffed) replace with new trach of same size 11/27  Please place additional 6-0 Shiley cuffed trach at bedside, MD to replace prn.         RECOMMENDATIONS    We recommend: RRT Recs: Continue POC per primary team.      FOLLOW-UP    Please call back the Rapid Response RT, Leo Walters, RRT at x [phone] for any questions or concerns.

## 2022-12-01 NOTE — PLAN OF CARE
Pt transferred to [facility] [address]     11/30/22 4718   Final Note   Assessment Type Final Discharge Note   Anticipated Discharge Disposition Short Term

## 2022-12-01 NOTE — PROGRESS NOTES
"[facility]ael jean [facility]  Adult Nutrition  Progress Note    SUMMARY       Recommendations    1) Continue tube feeds of Peptamen 1.5 with Prebio - rec'd increase to goal rate of 55 ml/hr to better meet kcal/protein needs. Provides 1980 kcal, 90g protein, 1014ml fluid.     2) Rec'd obtain new scaled weight to monitor trend.     3) RD to monitor and f/u.     Goals: Meet % of kcal/protein needs by RD f/u date  Nutrition Goal Status: new  Communication of RD Recs:  (POC)    Assessment and Plan  Nutrition Problem  Inadequate oral intake    Related to (etiology):   Decreased ability to consume sufficient needs    Signs and Symptoms (as evidenced by):   NPO      Interventions/Recommendations (treatment strategy):  Collaboration of nutrition care with other providers  EN    Nutrition Diagnosis Status:   New    Reason for Assessment    Reason For Assessment: RD follow-up  Diagnosis:  (SOB)  Relevant Medical History: hypertension, hypothyroidism, diabetes type 2, GERD , laryngeal cancer and malignant neoplasm of base of tongue  who underwent a total glossectomy pharyngectomy with lateral thigh reconstruction and skin graft on 11/09/2022 at [facility]  Interdisciplinary Rounds: did not attend  General Information Comments: Visited pt at bedside. Tolerating TF @ 40 ml/hr. No c/o N/V/D, LBM today.  Nutrition Discharge Planning: adequate nutrition via PEG tube    Nutrition Risk Screen    Nutrition Risk Screen: tube feeding or parenteral nutrition    Anthropometrics    Temp: 96.3 °F (35.7 °C)  Height: 5' 6" (167.6 cm)  Height (inches): 66 in  Weight Method: Standard Scale  Weight: 62.1 kg (137 lb)  Weight (lb): 137 lb  Ideal Body Weight (IBW), Male: 142 lb  % Ideal Body Weight, Male (lb): 96.48 %  BMI (Calculated): 22.1    Lab/Procedures/Meds    Pertinent Labs Reviewed: reviewed  Pertinent Labs Comments: Na 132, Glu 190, Ca 7.6, Alb 2.2  Pertinent Medications Reviewed: reviewed  Pertinent Medications Comments: pantoprazole, polyethylene " glycol, calcitriol, LR    Estimated/Assessed Needs    Weight Used For Calorie Calculations: 62.1 kg (136 lb 14.5 oz)  Energy Calorie Requirements (kcal): 1863 kcal/day  Energy Need Method: Kcal/kg (30-35 kcal/kg)  Protein Requirements: 75-93g pro/day (1.2-1.5 g/kg)  Weight Used For Protein Calculations: 62.1 kg (136 lb 14.5 oz)  Fluid Requirements (mL): 1 ml/kcal or fluid per MD     RDA Method (mL): 1863         Nutrition Prescription Ordered    Current Diet Order: NPO  Current Nutrition Support Formula Ordered: Peptamen 1.5 w/Prebio  Current Nutrition Support Rate Ordered: 40 (ml)  Current Nutrition Support Frequency Ordered: ml/hr x 24 hr    Evaluation of Received Nutrient/Fluid Intake    Enteral Calories (kcal): 1440  Enteral Protein (gm): 65  Enteral (Free Water) Fluid (mL): 737  % Kcal Needs: 77%  % Protein Needs: 87%  I/O: +4.2L since admit  Energy Calories Required: not meeting needs  Protein Required: not meeting needs  Comments: LBM 11/27  Tolerance: tolerating  % Intake of Estimated Energy Needs: 75 - 100 %  % Meal Intake: NPO    Nutrition Risk    F/u: 1x/week    Monitor and Evaluation    Food and Nutrient Intake: enteral nutrition intake  Food and Nutrient Adminstration: enteral and parenteral nutrition administration  Anthropometric Measurements: weight, weight change, body mass index  Biochemical Data, Medical Tests and Procedures: electrolyte and renal panel, gastrointestinal profile, glucose/endocrine profile, inflammatory profile, lipid profile  Nutrition-Focused Physical Findings: overall appearance, extremities, muscles and bones     Nutrition Follow-Up    RD Follow-up?: Yes

## 2022-12-01 NOTE — PLAN OF CARE
Pt aox4 on RA 21% trach collar, patient demonstrates understanding of POC.    Problem: Adult Inpatient Plan of Care  Goal: Plan of Care Review  Outcome: Ongoing, Progressing  Goal: Patient-Specific Goal (Individualized)  Outcome: Ongoing, Progressing  Goal: Absence of Hospital-Acquired Illness or Injury  Outcome: Ongoing, Progressing  Goal: Optimal Comfort and Wellbeing  Outcome: Ongoing, Progressing  Goal: Readiness for Transition of Care  Outcome: Ongoing, Progressing     Problem: Diabetes Comorbidity  Goal: Blood Glucose Level Within Targeted Range  Outcome: Ongoing, Progressing     Problem: Infection  Goal: Absence of Infection Signs and Symptoms  Outcome: Ongoing, Progressing     Problem: Impaired Wound Healing  Goal: Optimal Wound Healing  Outcome: Ongoing, Progressing     Problem: Fall Injury Risk  Goal: Absence of Fall and Fall-Related Injury  Outcome: Ongoing, Progressing     Problem: Skin Injury Risk Increased  Goal: Skin Health and Integrity  Outcome: Ongoing, Progressing

## 2022-12-01 NOTE — ASSESSMENT & PLAN NOTE
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    Currently with bacteremia and fungemia, port removed and ID recommending IV antibiotics at home, so will place PICC line.     ENT/HNS:  - Laryngectomy protocol   -Sign above bed + outside door stating patient is "neck breather" and "can not be intubated by mouth"   -Clean laryngectomy stoma as needed, or daily  - Keep tracheostomy in place     - OK to replace tracheostomy if dislodged, only in place for swelling/secretion assistance -- replaced 11/27  - Humidified O2 via trach collar  - Will follow up official CT read      Neuro:   - Pain: Multimodality PRN regimen initiated     Cardiac:  - HDS  - Page ENT before starting vasopressors  - H/O of paroxysmal a fib, will CTM      Pulmonary:   - PRN breathing treatments  - Humidified air per trach collar  - OK to remove tracheostomy for adequate suctioning     Renal:   - monitor Is and Os  - Cr stable     Infectious Disease:  - Leukocytosis and BC positive for pseudomonas -- now improved WBC  - ID consulted; appreciate recommendations   - TTE unremarkable    - Continue daily packing changes   - Antibiotics per ID team; to end 12/11; will place PICC line today  Antibiotics (From admission, onward)    Start     Stop Route Frequency Ordered    11/30/22 1500  vancomycin 1.25 g in dextrose 5% 250 mL IVPB (ready to mix)         -- IV Every 24 hours (non-standard times) 11/30/22 1220    11/29/22 2200  cefepime in dextrose 5 % IVPB 2 g         -- IV Every 8 hours (non-standard times) 11/29/22 1545    11/24/22 0900  mupirocin 2 % ointment         11/29 0859 Nasl 2 times daily 11/24/22 0319           Hematology/Oncology:  - H/H stable  - Lovenox DVT prophylaxis      FEN/GI  - Transaminitis and increased alk phos   - U/S of abdomen + liver dopppler     -- biliary sludge, otherwise unremarkable; doppler " wnl   - Medicine consulted; appreciate recs     - CRP, ESR elevated     - Autoimmune workup negative      - Hepatitis screen negative   - Continue advancing TFs to goal  - Postoperative hypoparathyroidism   - Hypocalcemia on initial admission     - Continue tums QID, calcitriol   - Replace electrolytes as needed to keep K>4, Mg>2 --repleted K+ 11/27  - Bowel reg  - Protonix for GI prophylaxis, GERD  - STRICT nausea control ondansetron, phenergan       MSK  - Out of bed as tolerated  - STSG donor site healed     Dispo:  - Continue hospital stay, pending arrangement of  for tube feeds, trach supplies, and antibiotics

## 2022-12-01 NOTE — CONSULTS
Double lumen PICC to right brachial vein.  37 cm in length, 26 cm arm circumference and 0 cm exposed.   Lot # [id number].

## 2022-12-01 NOTE — SUBJECTIVE & OBJECTIVE
Interval History: NAEON. Tolerating Tfs at 30    Medications:  Continuous Infusions:   lactated ringers 100 mL/hr at 11/30/22 2140     Scheduled Meds:   calcitrioL  0.25 mcg Per G Tube Daily    calcium carbonate  1,000 mg Per G Tube 5x Daily    ceFEPime (MAXIPIME) IVPB EXTENDED INFUSION  2 g Intravenous Q8H    enoxaparin  40 mg Subcutaneous Daily    micafungin (MYCAMINE) IVPB  100 mg Intravenous Q24H    pantoprazole  40 mg Intravenous Daily    polyethylene glycol  17 g Per G Tube Daily    vancomycin (VANCOCIN) IVPB  1,250 mg Intravenous Q24H     PRN Meds:acetaminophen, dextrose 10%, dextrose 10%, glucagon (human recombinant), hydrALAZINE, insulin aspart U-100, ondansetron, oxyCODONE, promethazine (PHENERGAN) IVPB, senna-docusate 8.6-50 mg, sodium chloride 0.9%     Review of patient's allergies indicates:   Allergen Reactions    Lovastatin Itching     Objective:     Vital Signs (24h Range):  Temp:  [96.2 °F (35.7 °C)-98.1 °F (36.7 °C)] 98.1 °F (36.7 °C)  Pulse:  [70-88] 76  Resp:  [18-20] 18  SpO2:  [95 %-99 %] 97 %  BP: (118-143)/(68-86) 123/68       Lines/Drains/Airways       Drain  Duration                  Gastrostomy/Enterostomy LUQ -- days              Airway  Duration             Adult Surgical Airway Shiley Cuffed -- days         Laryngectomy (Do Not Remove) 11/09/22 Laryngectomy stoma 22 days              Peripheral Intravenous Line  Duration                  Peripheral IV - Single Lumen 11/28/22 1311 20 G;1 3/4 in Left;Anterior Forearm 2 days                    Physical Exam  NAD  Awake and alert  Mild facial edema   Trach within stoma, removed. Stoma appears healthy  Skin graft to midline neck overlying flap, at left superior portion small area of breakdown though without drainage and remains with adequate coverage  Small area of dehiscence left laterally, near area of STSG but dry  Left area with packing in place, removed. Appears dry this AM  Intraoral flap is soft without fluctuance, secretions thin,  clear intraorally  Tracheal secretions increased but clear        Significant Labs:  CBC:   Recent Labs   Lab 11/29/22  0239   WBC 4.45   RBC 2.68*   HGB 8.0*   HCT 25.1*      MCV 94   MCH 29.9   MCHC 31.9*     CMP:   Recent Labs   Lab 11/30/22  0329   *   CALCIUM 7.6*   ALBUMIN 2.2*   PROT 5.7*   *   K 3.6   CO2 26      BUN 6*   CREATININE 0.9   ALKPHOS 1,198*   *   AST 82*   BILITOT 2.2*       Significant Diagnostics:  None

## 2022-12-01 NOTE — PROCEDURES
"Cyrus Batres Jr. is a 72 y.o. male patient.    Temp: 97.1 °F (36.2 °C) (12/01/22 0813)  Pulse: 80 (12/01/22 0813)  Resp: 18 (12/01/22 0813)  BP: 131/77 (12/01/22 0813)  SpO2: 99 % (12/01/22 0813)  Weight: 62.1 kg (137 lb) (11/25/22 1800)  Height: 5' 6" (167.6 cm) (11/25/22 1800)    PICC  Date/Time: 12/1/2022 10:16 AM  Performed by: Reyna Oneal RN  Assisting provider: Beni Albright RN  Consent Done: Yes  Time out: Immediately prior to procedure a time out was called to verify the correct patient, procedure, equipment, support staff and site/side marked as required  Indications: med administration and vascular access  Anesthesia: local infiltration  Local anesthetic: lidocaine 1% without epinephrine  Anesthetic Total (mL): 3  Description of findings: PICC  Preparation: skin prepped with ChloraPrep  Skin prep agent dried: skin prep agent completely dried prior to procedure  Sterile barriers: all five maximum sterile barriers used - cap, mask, sterile gown, sterile gloves, and large sterile sheet  Hand hygiene: hand hygiene performed prior to central venous catheter insertion  Location details: right brachial  Catheter type: double lumen  Catheter size: 5 Fr  Catheter Length: 37cm    Ultrasound guidance: yes  Vessel Caliber: medium and patent, compressibility normal  Vascular Doppler: not done  Needle advanced into vessel with real time Ultrasound guidance.  Guidewire confirmed in vessel.  Image recorded and saved.  Sterile sheath used.  Number of attempts: 1  Post-procedure: blood return through all ports, chlorhexidine patch and sterile dressing applied  Technical procedures used: 3CG  Specimens: No  Implants: No  Assessment: placement verified by x-ray  Complications: none        Name   12/1/2022    "

## 2022-12-01 NOTE — PLAN OF CARE
Recommendations    1) Continue tube feeds of Peptamen 1.5 with Prebio - rec'd increase to goal rate of 55 ml/hr to better meet kcal/protein needs. Provides 1980 kcal, 90g protein, 1014ml fluid.     2) Rec'd obtain new scaled weight to monitor trend.     3) RD to monitor and f/u.     Goals: Meet % of kcal/protein needs by RD f/u date  Nutrition Goal Status: new  Communication of RD Recs:  (POC)

## 2022-12-01 NOTE — SUBJECTIVE & OBJECTIVE
Interval History: No changes overnight. Patient reports feeling about the same, tolerating advancement of his tube feeds.    Medications:  Continuous Infusions:   lactated ringers 100 mL/hr at 11/30/22 2140     Scheduled Meds:   calcitrioL  0.25 mcg Per G Tube Daily    calcium carbonate  1,000 mg Per G Tube 5x Daily    ceFEPime (MAXIPIME) IVPB EXTENDED INFUSION  2 g Intravenous Q8H    enoxaparin  40 mg Subcutaneous Daily    micafungin (MYCAMINE) IVPB  100 mg Intravenous Q24H    pantoprazole  40 mg Intravenous Daily    polyethylene glycol  17 g Per G Tube Daily    vancomycin (VANCOCIN) IVPB  1,250 mg Intravenous Q24H     PRN Meds:acetaminophen, dextrose 10%, dextrose 10%, glucagon (human recombinant), hydrALAZINE, insulin aspart U-100, ondansetron, oxyCODONE, promethazine (PHENERGAN) IVPB, senna-docusate 8.6-50 mg, sodium chloride 0.9%     Review of patient's allergies indicates:   Allergen Reactions    Lovastatin Itching     Objective:     Vital Signs (24h Range):  Temp:  [96.2 °F (35.7 °C)-98.1 °F (36.7 °C)] 98.1 °F (36.7 °C)  Pulse:  [70-88] 76  Resp:  [18-20] 18  SpO2:  [95 %-99 %] 97 %  BP: (118-143)/(68-86) 123/68       Lines/Drains/Airways       Drain  Duration                  Gastrostomy/Enterostomy LUQ -- days              Airway  Duration             Adult Surgical Airway Shiley Cuffed -- days         Laryngectomy (Do Not Remove) 11/09/22 Laryngectomy stoma 22 days              Peripheral Intravenous Line  Duration                  Peripheral IV - Single Lumen 11/28/22 1311 20 G;1 3/4 in Left;Anterior Forearm 2 days                    Physical Exam  NAD  Awake and alert  Mild facial edema   Trach within stoma, removed. Stoma appears healthy  Skin graft to midline neck overlying flap, at left superior portion small area of breakdown though without drainage and remains with adequate coverage  Small area of dehiscence laterally, near area of STSG, packing removed. Unable to express any drainage, stable in  appearance  Left area with packing in place, removed. Fewer secretions today.  Intraoral flap is soft without fluctuance, secretions thin, clear intraorally  Tracheal secretions increased but clear     Significant Labs:  CBC:   Recent Labs   Lab 11/29/22  0239   WBC 4.45   RBC 2.68*   HGB 8.0*   HCT 25.1*      MCV 94   MCH 29.9   MCHC 31.9*     CMP:   Recent Labs   Lab 11/30/22  0329   *   CALCIUM 7.6*   ALBUMIN 2.2*   PROT 5.7*   *   K 3.6   CO2 26      BUN 6*   CREATININE 0.9   ALKPHOS 1,198*   *   AST 82*   BILITOT 2.2*       Significant Diagnostics:  None

## 2022-12-02 LAB
ALBUMIN SERPL BCP-MCNC: 2.1 G/DL (ref 3.5–5.2)
ALP SERPL-CCNC: 1153 U/L (ref 55–135)
ALT SERPL W/O P-5'-P-CCNC: 118 U/L (ref 10–44)
ANION GAP SERPL CALC-SCNC: 8 MMOL/L (ref 8–16)
AST SERPL-CCNC: 83 U/L (ref 10–40)
BASOPHILS # BLD AUTO: 0.03 K/UL (ref 0–0.2)
BASOPHILS NFR BLD: 0.4 % (ref 0–1.9)
BILIRUB SERPL-MCNC: 1.6 MG/DL (ref 0.1–1)
BUN SERPL-MCNC: 11 MG/DL (ref 8–23)
CALCIUM SERPL-MCNC: 7.1 MG/DL (ref 8.7–10.5)
CHLORIDE SERPL-SCNC: 103 MMOL/L (ref 95–110)
CO2 SERPL-SCNC: 21 MMOL/L (ref 23–29)
CREAT SERPL-MCNC: 0.8 MG/DL (ref 0.5–1.4)
DIFFERENTIAL METHOD: ABNORMAL
EOSINOPHIL # BLD AUTO: 0.2 K/UL (ref 0–0.5)
EOSINOPHIL NFR BLD: 2.3 % (ref 0–8)
ERYTHROCYTE [DISTWIDTH] IN BLOOD BY AUTOMATED COUNT: 15.9 % (ref 11.5–14.5)
EST. GFR  (NO RACE VARIABLE): >60 ML/MIN/1.73 M^2
GLUCOSE SERPL-MCNC: 174 MG/DL (ref 70–110)
HCT VFR BLD AUTO: 29.7 % (ref 40–54)
HGB BLD-MCNC: 9.3 G/DL (ref 14–18)
IMM GRANULOCYTES # BLD AUTO: 0.07 K/UL (ref 0–0.04)
IMM GRANULOCYTES NFR BLD AUTO: 1 % (ref 0–0.5)
LYMPHOCYTES # BLD AUTO: 0.4 K/UL (ref 1–4.8)
LYMPHOCYTES NFR BLD: 6 % (ref 18–48)
MCH RBC QN AUTO: 30.1 PG (ref 27–31)
MCHC RBC AUTO-ENTMCNC: 31.3 G/DL (ref 32–36)
MCV RBC AUTO: 96 FL (ref 82–98)
MONOCYTES # BLD AUTO: 0.4 K/UL (ref 0.3–1)
MONOCYTES NFR BLD: 5.5 % (ref 4–15)
NEUTROPHILS # BLD AUTO: 5.8 K/UL (ref 1.8–7.7)
NEUTROPHILS NFR BLD: 84.8 % (ref 38–73)
NRBC BLD-RTO: 0 /100 WBC
PLATELET # BLD AUTO: 355 K/UL (ref 150–450)
PMV BLD AUTO: 10.8 FL (ref 9.2–12.9)
POCT GLUCOSE: 178 MG/DL (ref 70–110)
POCT GLUCOSE: 185 MG/DL (ref 70–110)
POCT GLUCOSE: 189 MG/DL (ref 70–110)
POCT GLUCOSE: 210 MG/DL (ref 70–110)
POCT GLUCOSE: 227 MG/DL (ref 70–110)
POTASSIUM SERPL-SCNC: 3.9 MMOL/L (ref 3.5–5.1)
PROT SERPL-MCNC: 5.7 G/DL (ref 6–8.4)
RBC # BLD AUTO: 3.09 M/UL (ref 4.6–6.2)
SODIUM SERPL-SCNC: 132 MMOL/L (ref 136–145)
VANCOMYCIN TROUGH SERPL-MCNC: 16.1 UG/ML (ref 10–22)
WBC # BLD AUTO: 6.89 K/UL (ref 3.9–12.7)

## 2022-12-02 PROCEDURE — 20600001 HC STEP DOWN PRIVATE ROOM

## 2022-12-02 PROCEDURE — A4216 STERILE WATER/SALINE, 10 ML: HCPCS | Performed by: OTOLARYNGOLOGY

## 2022-12-02 PROCEDURE — 85025 COMPLETE CBC W/AUTO DIFF WBC: CPT | Performed by: STUDENT IN AN ORGANIZED HEALTH CARE EDUCATION/TRAINING PROGRAM

## 2022-12-02 PROCEDURE — 25000003 PHARM REV CODE 250: Performed by: STUDENT IN AN ORGANIZED HEALTH CARE EDUCATION/TRAINING PROGRAM

## 2022-12-02 PROCEDURE — 99900035 HC TECH TIME PER 15 MIN (STAT)

## 2022-12-02 PROCEDURE — 25000003 PHARM REV CODE 250: Performed by: OTOLARYNGOLOGY

## 2022-12-02 PROCEDURE — 63600175 PHARM REV CODE 636 W HCPCS: Mod: JG | Performed by: STUDENT IN AN ORGANIZED HEALTH CARE EDUCATION/TRAINING PROGRAM

## 2022-12-02 PROCEDURE — 63600175 PHARM REV CODE 636 W HCPCS: Performed by: OTOLARYNGOLOGY

## 2022-12-02 PROCEDURE — 63600175 PHARM REV CODE 636 W HCPCS

## 2022-12-02 PROCEDURE — 63600175 PHARM REV CODE 636 W HCPCS: Performed by: STUDENT IN AN ORGANIZED HEALTH CARE EDUCATION/TRAINING PROGRAM

## 2022-12-02 PROCEDURE — 94761 N-INVAS EAR/PLS OXIMETRY MLT: CPT

## 2022-12-02 PROCEDURE — 80202 ASSAY OF VANCOMYCIN: CPT | Performed by: OTOLARYNGOLOGY

## 2022-12-02 PROCEDURE — 27000221 HC OXYGEN, UP TO 24 HOURS

## 2022-12-02 PROCEDURE — 63700000 PHARM REV CODE 250 ALT 637 W/O HCPCS: Performed by: STUDENT IN AN ORGANIZED HEALTH CARE EDUCATION/TRAINING PROGRAM

## 2022-12-02 PROCEDURE — C9113 INJ PANTOPRAZOLE SODIUM, VIA: HCPCS | Performed by: STUDENT IN AN ORGANIZED HEALTH CARE EDUCATION/TRAINING PROGRAM

## 2022-12-02 PROCEDURE — 80053 COMPREHEN METABOLIC PANEL: CPT | Performed by: STUDENT IN AN ORGANIZED HEALTH CARE EDUCATION/TRAINING PROGRAM

## 2022-12-02 RX ORDER — PANTOPRAZOLE SODIUM 40 MG/1
40 FOR SUSPENSION ORAL DAILY
Status: CANCELLED | OUTPATIENT
Start: 2022-12-02

## 2022-12-02 RX ORDER — SODIUM CHLORIDE 1 G/1
1000 TABLET ORAL DAILY
Status: COMPLETED | OUTPATIENT
Start: 2022-12-02 | End: 2022-12-04

## 2022-12-02 RX ADMIN — CEFEPIME 2 G: 2 INJECTION, POWDER, FOR SOLUTION INTRAVENOUS at 03:12

## 2022-12-02 RX ADMIN — CALCIUM CARBONATE (ANTACID) CHEW TAB 500 MG 1000 MG: 500 CHEW TAB at 05:12

## 2022-12-02 RX ADMIN — Medication 10 ML: at 05:12

## 2022-12-02 RX ADMIN — MICAFUNGIN SODIUM 100 MG: 100 INJECTION, POWDER, LYOPHILIZED, FOR SOLUTION INTRAVENOUS at 08:12

## 2022-12-02 RX ADMIN — CEFEPIME 2 G: 2 INJECTION, POWDER, FOR SOLUTION INTRAVENOUS at 12:12

## 2022-12-02 RX ADMIN — SODIUM CHLORIDE 1000 MG: 1 TABLET ORAL at 03:12

## 2022-12-02 RX ADMIN — ENOXAPARIN SODIUM 40 MG: 40 INJECTION SUBCUTANEOUS at 04:12

## 2022-12-02 RX ADMIN — CALCIUM CARBONATE (ANTACID) CHEW TAB 500 MG 1000 MG: 500 CHEW TAB at 06:12

## 2022-12-02 RX ADMIN — CALCIUM CARBONATE (ANTACID) CHEW TAB 500 MG 1000 MG: 500 CHEW TAB at 10:12

## 2022-12-02 RX ADMIN — PANTOPRAZOLE SODIUM 40 MG: 40 INJECTION, POWDER, FOR SOLUTION INTRAVENOUS at 08:12

## 2022-12-02 RX ADMIN — INSULIN ASPART 1 UNITS: 100 INJECTION, SOLUTION INTRAVENOUS; SUBCUTANEOUS at 12:12

## 2022-12-02 RX ADMIN — CALCIUM CARBONATE (ANTACID) CHEW TAB 500 MG 1000 MG: 500 CHEW TAB at 09:12

## 2022-12-02 RX ADMIN — VANCOMYCIN HYDROCHLORIDE 1250 MG: 1.25 INJECTION, POWDER, LYOPHILIZED, FOR SOLUTION INTRAVENOUS at 03:12

## 2022-12-02 RX ADMIN — INSULIN ASPART 2 UNITS: 100 INJECTION, SOLUTION INTRAVENOUS; SUBCUTANEOUS at 06:12

## 2022-12-02 RX ADMIN — CALCITRIOL 0.25 MCG: 1 SOLUTION ORAL at 08:12

## 2022-12-02 RX ADMIN — CALCIUM CARBONATE (ANTACID) CHEW TAB 500 MG 1000 MG: 500 CHEW TAB at 03:12

## 2022-12-02 RX ADMIN — Medication 10 ML: at 12:12

## 2022-12-02 RX ADMIN — CEFEPIME 2 G: 2 INJECTION, POWDER, FOR SOLUTION INTRAVENOUS at 08:12

## 2022-12-02 RX ADMIN — INSULIN ASPART 2 UNITS: 100 INJECTION, SOLUTION INTRAVENOUS; SUBCUTANEOUS at 04:12

## 2022-12-02 NOTE — PLAN OF CARE
[facility] - [facility]  Discharge Reassessment    Primary Care Provider: Maico Patterson MD    Expected Discharge Date: 12/6/2022    Reassessment (most recent)       Discharge Reassessment - 12/02/22 1112          Discharge Reassessment    Assessment Type Discharge Planning Reassessment     Did the patient's condition or plan change since previous assessment? No     Discharge Plan discussed with: Patient     Communicated CHIARA with patient/caregiver Yes     Discharge Plan A Home with family     DME Needed Upon Discharge  nutrition supplies     Discharge Barriers Identified None     Why the patient remains in the hospital Requires continued medical care        Post-Acute Status    Hospital Resources/Appts/Education Provided Provided patient/caregiver with written discharge plan information     Discharge Delays None known at this time                     Pt not ready for discharge due to: Not medically ready  SW will remain available for families in [facility].  Currently pt has d/c plans in progress at this time.      Pam Webber LCSW  Case Management/[facility]  [phone]

## 2022-12-02 NOTE — PLAN OF CARE
Pt aox4 on trach collar 21%, patient demonstrates understanding of POC.    Problem: Adult Inpatient Plan of Care  Goal: Plan of Care Review  Outcome: Ongoing, Progressing  Goal: Patient-Specific Goal (Individualized)  Outcome: Ongoing, Progressing  Goal: Absence of Hospital-Acquired Illness or Injury  Outcome: Ongoing, Progressing  Goal: Optimal Comfort and Wellbeing  Outcome: Ongoing, Progressing  Goal: Readiness for Transition of Care  Outcome: Ongoing, Progressing     Problem: Diabetes Comorbidity  Goal: Blood Glucose Level Within Targeted Range  Outcome: Ongoing, Progressing     Problem: Infection  Goal: Absence of Infection Signs and Symptoms  Outcome: Ongoing, Progressing     Problem: Impaired Wound Healing  Goal: Optimal Wound Healing  Outcome: Ongoing, Progressing     Problem: Fall Injury Risk  Goal: Absence of Fall and Fall-Related Injury  Outcome: Ongoing, Progressing     Problem: Skin Injury Risk Increased  Goal: Skin Health and Integrity  Outcome: Ongoing, Progressing

## 2022-12-02 NOTE — SUBJECTIVE & OBJECTIVE
Interval History: NAEON. Tolerating feeds.     Medications:  Continuous Infusions:   lactated ringers 100 mL/hr at 12/01/22 1113     Scheduled Meds:   calcitrioL  0.25 mcg Per G Tube Daily    calcium carbonate  1,000 mg Per G Tube 5x Daily    ceFEPime (MAXIPIME) IVPB EXTENDED INFUSION  2 g Intravenous Q8H    enoxaparin  40 mg Subcutaneous Daily    micafungin (MYCAMINE) IVPB  100 mg Intravenous Q24H    pantoprazole  40 mg Intravenous Daily    polyethylene glycol  17 g Per G Tube Daily    sodium chloride 0.9%  10 mL Intravenous Q6H    vancomycin (VANCOCIN) IVPB  1,250 mg Intravenous Q24H     PRN Meds:acetaminophen, dextrose 10%, dextrose 10%, glucagon (human recombinant), hydrALAZINE, insulin aspart U-100, ondansetron, oxyCODONE, promethazine (PHENERGAN) IVPB, senna-docusate 8.6-50 mg, sodium chloride 0.9%, Flushing PICC Protocol **AND** sodium chloride 0.9% **AND** sodium chloride 0.9%     Review of patient's allergies indicates:   Allergen Reactions    Lovastatin Itching     Objective:     Vital Signs (24h Range):  Temp:  [96.3 °F (35.7 °C)-98.4 °F (36.9 °C)] 96.6 °F (35.9 °C)  Pulse:  [75-80] 78  Resp:  [17-20] 18  SpO2:  [98 %-100 %] 99 %  BP: (118-131)/(66-77) 118/67       Lines/Drains/Airways       Peripherally Inserted Central Catheter Line  Duration             PICC Double Lumen 12/01/22 1017 right brachial <1 day              Drain  Duration                  Gastrostomy/Enterostomy LUQ -- days              Airway  Duration             Adult Surgical Airway Shiley Cuffed -- days         Laryngectomy (Do Not Remove) 11/09/22 Laryngectomy stoma 23 days              Peripheral Intravenous Line  Duration                  Peripheral IV - Single Lumen 11/28/22 1311 20 G;1 3/4 in Left;Anterior Forearm 3 days                    Physical Exam  NAD  Awake and alert  Mild facial edema   Trach within stoma, removed. Stoma appears healthy  Skin graft to midline neck overlying flap, at left superior portion small area of  breakdown though without drainage and remains with adequate coverage  Small area of dehiscence left laterally, near area of STSG but dry  Left area with packing in place, removed. Thick drainage expressed, appears purulent and salivary in origin   Intraoral flap is soft without fluctuance, secretions thin, clear intraorally  Tracheal secretions increased but clear     Significant Labs:  CBC:   Recent Labs   Lab 12/01/22  0625   WBC 5.56  5.56   RBC 3.34*  3.34*   HGB 9.9*  9.9*   HCT 31.6*  31.6*     380   MCV 95  95   MCH 29.6  29.6   MCHC 31.3*  31.3*     CMP:   Recent Labs   Lab 12/02/22  0520   *   CALCIUM 7.1*   ALBUMIN 2.1*   PROT 5.7*   *   K 3.9   CO2 21*      BUN 11   CREATININE 0.8   ALKPHOS 1,153*   *   AST 83*   BILITOT 1.6*       Significant Diagnostics:  None

## 2022-12-02 NOTE — ASSESSMENT & PLAN NOTE
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    Currently with bacteremia and fungemia, port removed and ID recommending IV antibiotics at home. PICC placed 12/1      ENT/HNS:  - Laryngectomy protocol   -Sign above bed + outside door stating patient is "neck breather" and "can not be intubated by mouth"   -Clean laryngectomy stoma as needed, or daily  - Keep tracheostomy in place     - OK to replace tracheostomy if dislodged, only in place for swelling/secretion assistance -- replaced 11/27  - Humidified O2 via trach collar  - CT with fluid collection, will continue packing changes      Neuro:   - Pain: Multimodality PRN regimen initiated     Cardiac:  - HDS  - Page ENT before starting vasopressors  - H/O of paroxysmal a fib, will CTM      Pulmonary:   - PRN breathing treatments  - Humidified air per trach collar  - OK to remove tracheostomy for adequate suctioning     Renal:   - monitor Is and Os  - Cr stable     Infectious Disease:  - Leukocytosis and BC positive for pseudomonas -- now improved WBC  - ID consulted; appreciate recommendations   - TTE unremarkable    - Continue daily packing changes   - Antibiotics per ID team; to end 12/11; PICC line placed   Antibiotics (From admission, onward)    Start     Stop Route Frequency Ordered    11/30/22 1500  vancomycin 1.25 g in dextrose 5% 250 mL IVPB (ready to mix)         -- IV Every 24 hours (non-standard times) 11/30/22 1220    11/29/22 2200  cefepime in dextrose 5 % IVPB 2 g         -- IV Every 8 hours (non-standard times) 11/29/22 1545    11/24/22 0900  mupirocin 2 % ointment         11/29 0859 Nasl 2 times daily 11/24/22 0319           Hematology/Oncology:  - H/H stable  - Lovenox DVT prophylaxis      FEN/GI  - Transaminitis and increased alk phos   - U/S of abdomen + liver dopppler     -- biliary sludge, otherwise unremarkable; " doppler wnl   - Medicine consulted; appreciate recs     - CRP, ESR elevated     - Autoimmune workup negative      - Hepatitis screen negative   - Continue advancing TFs to goal  - Postoperative hypoparathyroidism   - Hypocalcemia on initial admission     - Continue tums QID, calcitriol   - Replace electrolytes as needed to keep K>4, Mg>2 --repleted K+ 11/27  - Bowel reg  - Protonix for GI prophylaxis, GERD  - STRICT nausea control ondansetron, phenergan       MSK  - Out of bed as tolerated  - STSG donor site healed     Dispo:  - Continue hospital stay, pending arrangement of  for tube feeds, trach supplies, and antibiotics

## 2022-12-02 NOTE — PLAN OF CARE
Pt aaox4. No s/s of distress. No complaints. Pt on room air and has humidified air via t-collar. VSS. Bed in lowest position. Call light within reach. Pt on LR@100cc/hr. TF@ goal rate of 40cc/hr. No residual noted. Meds given via G-tube w/ no resistance. Great urine output. NAEON. Will continue POC.    Problem: Adult Inpatient Plan of Care  Goal: Plan of Care Review  Outcome: Ongoing, Progressing  Goal: Patient-Specific Goal (Individualized)  Outcome: Ongoing, Progressing  Goal: Absence of Hospital-Acquired Illness or Injury  Outcome: Ongoing, Progressing  Goal: Optimal Comfort and Wellbeing  Outcome: Ongoing, Progressing  Goal: Readiness for Transition of Care  Outcome: Ongoing, Progressing     Problem: Diabetes Comorbidity  Goal: Blood Glucose Level Within Targeted Range  Outcome: Ongoing, Progressing     Problem: Infection  Goal: Absence of Infection Signs and Symptoms  Outcome: Ongoing, Progressing     Problem: Impaired Wound Healing  Goal: Optimal Wound Healing  Outcome: Ongoing, Progressing     Problem: Fall Injury Risk  Goal: Absence of Fall and Fall-Related Injury  Outcome: Ongoing, Progressing     Problem: Skin Injury Risk Increased  Goal: Skin Health and Integrity  Outcome: Ongoing, Progressing

## 2022-12-02 NOTE — PROGRESS NOTES
[facility] - [facility]  Otorhinolaryngology-Head & Neck Surgery  Progress Note    Subjective:     Post-Op Info:  * No surgery found *      Hospital Day: 10     Interval History: NAEON. Tolerating feeds.     Medications:  Continuous Infusions:   lactated ringers 100 mL/hr at 12/01/22 1113     Scheduled Meds:   calcitrioL  0.25 mcg Per G Tube Daily    calcium carbonate  1,000 mg Per G Tube 5x Daily    ceFEPime (MAXIPIME) IVPB EXTENDED INFUSION  2 g Intravenous Q8H    enoxaparin  40 mg Subcutaneous Daily    micafungin (MYCAMINE) IVPB  100 mg Intravenous Q24H    pantoprazole  40 mg Intravenous Daily    polyethylene glycol  17 g Per G Tube Daily    sodium chloride 0.9%  10 mL Intravenous Q6H    vancomycin (VANCOCIN) IVPB  1,250 mg Intravenous Q24H     PRN Meds:acetaminophen, dextrose 10%, dextrose 10%, glucagon (human recombinant), hydrALAZINE, insulin aspart U-100, ondansetron, oxyCODONE, promethazine (PHENERGAN) IVPB, senna-docusate 8.6-50 mg, sodium chloride 0.9%, Flushing PICC Protocol **AND** sodium chloride 0.9% **AND** sodium chloride 0.9%     Review of patient's allergies indicates:   Allergen Reactions    Lovastatin Itching     Objective:     Vital Signs (24h Range):  Temp:  [96.3 °F (35.7 °C)-98.4 °F (36.9 °C)] 96.6 °F (35.9 °C)  Pulse:  [75-80] 78  Resp:  [17-20] 18  SpO2:  [98 %-100 %] 99 %  BP: (118-131)/(66-77) 118/67       Lines/Drains/Airways       Peripherally Inserted Central Catheter Line  Duration             PICC Double Lumen 12/01/22 1017 right brachial <1 day              Drain  Duration                  Gastrostomy/Enterostomy LUQ -- days              Airway  Duration             Adult Surgical Airway Shiley Cuffed -- days         Laryngectomy (Do Not Remove) 11/09/22 Laryngectomy stoma 23 days              Peripheral Intravenous Line  Duration                  Peripheral IV - Single Lumen 11/28/22 1311 20 G;1 3/4 in Left;Anterior Forearm 3 days                    Physical Exam  NAD  Awake  "and alert  Mild facial edema   Trach within stoma, removed. Stoma appears healthy  Skin graft to midline neck overlying flap, at left superior portion small area of breakdown though without drainage and remains with adequate coverage  Small area of dehiscence left laterally, near area of STSG but dry  Left area with packing in place, removed. Thick drainage expressed, appears purulent and salivary in origin   Intraoral flap is soft without fluctuance, secretions thin, clear intraorally  Tracheal secretions increased but clear     Significant Labs:  CBC:   Recent Labs   Lab 12/01/22  0625   WBC 5.56  5.56   RBC 3.34*  3.34*   HGB 9.9*  9.9*   HCT 31.6*  31.6*     380   MCV 95  95   MCH 29.6  29.6   MCHC 31.3*  31.3*     CMP:   Recent Labs   Lab 12/02/22  0520   *   CALCIUM 7.1*   ALBUMIN 2.1*   PROT 5.7*   *   K 3.9   CO2 21*      BUN 11   CREATININE 0.8   ALKPHOS 1,153*   *   AST 83*   BILITOT 1.6*       Significant Diagnostics:  None    Assessment/Plan:     Laryngeal cancer  72 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    Currently with bacteremia and fungemia, port removed and ID recommending IV antibiotics at home. PICC placed 12/1      ENT/HNS:  - Laryngectomy protocol   -Sign above bed + outside door stating patient is "neck breather" and "can not be intubated by mouth"   -Clean laryngectomy stoma as needed, or daily  - Keep tracheostomy in place     - OK to replace tracheostomy if dislodged, only in place for swelling/secretion assistance -- replaced 11/27  - Humidified O2 via trach collar  - CT with fluid collection, will continue packing changes      Neuro:   - Pain: Multimodality PRN regimen initiated     Cardiac:  - HDS  - Page ENT before starting vasopressors  - H/O of paroxysmal a fib, will CTM      Pulmonary:   - PRN breathing " treatments  - Humidified air per trach collar  - OK to remove tracheostomy for adequate suctioning     Renal:   - monitor Is and Os  - Cr stable     Infectious Disease:  - Leukocytosis and BC positive for pseudomonas -- now improved WBC  - ID consulted; appreciate recommendations   - TTE unremarkable    - Continue daily packing changes   - Antibiotics per ID team; to end 12/11; PICC line placed   Antibiotics (From admission, onward)    Start     Stop Route Frequency Ordered    11/30/22 1500  vancomycin 1.25 g in dextrose 5% 250 mL IVPB (ready to mix)         -- IV Every 24 hours (non-standard times) 11/30/22 1220    11/29/22 2200  cefepime in dextrose 5 % IVPB 2 g         -- IV Every 8 hours (non-standard times) 11/29/22 1545    11/24/22 0900  mupirocin 2 % ointment         11/29 0859 Nasl 2 times daily 11/24/22 0319           Hematology/Oncology:  - H/H stable  - Lovenox DVT prophylaxis      FEN/GI  - Transaminitis and increased alk phos   - U/S of abdomen + liver dopppler     -- biliary sludge, otherwise unremarkable; doppler wnl   - Medicine consulted; appreciate recs     - CRP, ESR elevated     - Autoimmune workup negative      - Hepatitis screen negative   - Continue advancing TFs to goal  - Postoperative hypoparathyroidism   - Hypocalcemia on initial admission     - Continue tums QID, calcitriol   - Replace electrolytes as needed to keep K>4, Mg>2 --repleted K+ 11/27  - Bowel reg  - Protonix for GI prophylaxis, GERD  - STRICT nausea control ondansetron, phenergan       MSK  - Out of bed as tolerated  - STSG donor site healed     Dispo:  - Continue hospital stay, pending arrangement of  for tube feeds, trach supplies, and antibiotics          Jo Ann Bryant MD  Otorhinolaryngology-Head & Neck Surgery  [facility]

## 2022-12-03 LAB
ALBUMIN SERPL BCP-MCNC: 2.3 G/DL (ref 3.5–5.2)
ALP SERPL-CCNC: 1065 U/L (ref 55–135)
ALT SERPL W/O P-5'-P-CCNC: 112 U/L (ref 10–44)
ANION GAP SERPL CALC-SCNC: 7 MMOL/L (ref 8–16)
AST SERPL-CCNC: 67 U/L (ref 10–40)
BASOPHILS # BLD AUTO: 0.03 K/UL (ref 0–0.2)
BASOPHILS NFR BLD: 0.5 % (ref 0–1.9)
BILIRUB SERPL-MCNC: 1.6 MG/DL (ref 0.1–1)
BUN SERPL-MCNC: 14 MG/DL (ref 8–23)
CALCIUM SERPL-MCNC: 7.2 MG/DL (ref 8.7–10.5)
CHLORIDE SERPL-SCNC: 103 MMOL/L (ref 95–110)
CO2 SERPL-SCNC: 21 MMOL/L (ref 23–29)
CREAT SERPL-MCNC: 0.7 MG/DL (ref 0.5–1.4)
DIFFERENTIAL METHOD: ABNORMAL
EOSINOPHIL # BLD AUTO: 0.2 K/UL (ref 0–0.5)
EOSINOPHIL NFR BLD: 3.3 % (ref 0–8)
ERYTHROCYTE [DISTWIDTH] IN BLOOD BY AUTOMATED COUNT: 15.9 % (ref 11.5–14.5)
EST. GFR  (NO RACE VARIABLE): >60 ML/MIN/1.73 M^2
GLUCOSE SERPL-MCNC: 120 MG/DL (ref 70–110)
HCT VFR BLD AUTO: 29.6 % (ref 40–54)
HGB BLD-MCNC: 9.2 G/DL (ref 14–18)
IMM GRANULOCYTES # BLD AUTO: 0.1 K/UL (ref 0–0.04)
IMM GRANULOCYTES NFR BLD AUTO: 1.6 % (ref 0–0.5)
LYMPHOCYTES # BLD AUTO: 0.4 K/UL (ref 1–4.8)
LYMPHOCYTES NFR BLD: 6.8 % (ref 18–48)
MCH RBC QN AUTO: 30 PG (ref 27–31)
MCHC RBC AUTO-ENTMCNC: 31.1 G/DL (ref 32–36)
MCV RBC AUTO: 96 FL (ref 82–98)
MONOCYTES # BLD AUTO: 0.4 K/UL (ref 0.3–1)
MONOCYTES NFR BLD: 6 % (ref 4–15)
NEUTROPHILS # BLD AUTO: 5.2 K/UL (ref 1.8–7.7)
NEUTROPHILS NFR BLD: 81.8 % (ref 38–73)
NRBC BLD-RTO: 0 /100 WBC
PLATELET # BLD AUTO: 306 K/UL (ref 150–450)
PMV BLD AUTO: 10.2 FL (ref 9.2–12.9)
POCT GLUCOSE: 146 MG/DL (ref 70–110)
POCT GLUCOSE: 151 MG/DL (ref 70–110)
POCT GLUCOSE: 194 MG/DL (ref 70–110)
POCT GLUCOSE: 208 MG/DL (ref 70–110)
POCT GLUCOSE: 223 MG/DL (ref 70–110)
POTASSIUM SERPL-SCNC: 4 MMOL/L (ref 3.5–5.1)
PROT SERPL-MCNC: 5.8 G/DL (ref 6–8.4)
RBC # BLD AUTO: 3.07 M/UL (ref 4.6–6.2)
SODIUM SERPL-SCNC: 131 MMOL/L (ref 136–145)
WBC # BLD AUTO: 6.35 K/UL (ref 3.9–12.7)

## 2022-12-03 PROCEDURE — 63700000 PHARM REV CODE 250 ALT 637 W/O HCPCS: Performed by: STUDENT IN AN ORGANIZED HEALTH CARE EDUCATION/TRAINING PROGRAM

## 2022-12-03 PROCEDURE — A4216 STERILE WATER/SALINE, 10 ML: HCPCS | Performed by: OTOLARYNGOLOGY

## 2022-12-03 PROCEDURE — 63600175 PHARM REV CODE 636 W HCPCS

## 2022-12-03 PROCEDURE — 63600175 PHARM REV CODE 636 W HCPCS: Performed by: STUDENT IN AN ORGANIZED HEALTH CARE EDUCATION/TRAINING PROGRAM

## 2022-12-03 PROCEDURE — 27000221 HC OXYGEN, UP TO 24 HOURS

## 2022-12-03 PROCEDURE — 20600001 HC STEP DOWN PRIVATE ROOM

## 2022-12-03 PROCEDURE — 94761 N-INVAS EAR/PLS OXIMETRY MLT: CPT

## 2022-12-03 PROCEDURE — 25000003 PHARM REV CODE 250: Performed by: STUDENT IN AN ORGANIZED HEALTH CARE EDUCATION/TRAINING PROGRAM

## 2022-12-03 PROCEDURE — 80053 COMPREHEN METABOLIC PANEL: CPT | Performed by: STUDENT IN AN ORGANIZED HEALTH CARE EDUCATION/TRAINING PROGRAM

## 2022-12-03 PROCEDURE — 99900035 HC TECH TIME PER 15 MIN (STAT)

## 2022-12-03 PROCEDURE — 85025 COMPLETE CBC W/AUTO DIFF WBC: CPT | Performed by: STUDENT IN AN ORGANIZED HEALTH CARE EDUCATION/TRAINING PROGRAM

## 2022-12-03 PROCEDURE — 63600175 PHARM REV CODE 636 W HCPCS: Performed by: OTOLARYNGOLOGY

## 2022-12-03 PROCEDURE — 63600175 PHARM REV CODE 636 W HCPCS: Mod: JG | Performed by: STUDENT IN AN ORGANIZED HEALTH CARE EDUCATION/TRAINING PROGRAM

## 2022-12-03 PROCEDURE — 99900026 HC AIRWAY MAINTENANCE (STAT)

## 2022-12-03 PROCEDURE — C9113 INJ PANTOPRAZOLE SODIUM, VIA: HCPCS | Performed by: STUDENT IN AN ORGANIZED HEALTH CARE EDUCATION/TRAINING PROGRAM

## 2022-12-03 PROCEDURE — 31603 EMER TRACHEOSTOMY TTRACH: CPT

## 2022-12-03 PROCEDURE — 25000003 PHARM REV CODE 250: Performed by: OTOLARYNGOLOGY

## 2022-12-03 RX ADMIN — CEFEPIME 2 G: 2 INJECTION, POWDER, FOR SOLUTION INTRAVENOUS at 03:12

## 2022-12-03 RX ADMIN — CALCITRIOL 0.25 MCG: 1 SOLUTION ORAL at 08:12

## 2022-12-03 RX ADMIN — INSULIN ASPART 4 UNITS: 100 INJECTION, SOLUTION INTRAVENOUS; SUBCUTANEOUS at 12:12

## 2022-12-03 RX ADMIN — Medication 10 ML: at 12:12

## 2022-12-03 RX ADMIN — CEFEPIME 2 G: 2 INJECTION, POWDER, FOR SOLUTION INTRAVENOUS at 08:12

## 2022-12-03 RX ADMIN — INSULIN ASPART 2 UNITS: 100 INJECTION, SOLUTION INTRAVENOUS; SUBCUTANEOUS at 06:12

## 2022-12-03 RX ADMIN — MICAFUNGIN SODIUM 100 MG: 100 INJECTION, POWDER, LYOPHILIZED, FOR SOLUTION INTRAVENOUS at 08:12

## 2022-12-03 RX ADMIN — PANTOPRAZOLE SODIUM 40 MG: 40 INJECTION, POWDER, FOR SOLUTION INTRAVENOUS at 08:12

## 2022-12-03 RX ADMIN — CALCIUM CARBONATE (ANTACID) CHEW TAB 500 MG 1000 MG: 500 CHEW TAB at 03:12

## 2022-12-03 RX ADMIN — CALCIUM CARBONATE (ANTACID) CHEW TAB 500 MG 1000 MG: 500 CHEW TAB at 10:12

## 2022-12-03 RX ADMIN — VANCOMYCIN HYDROCHLORIDE 1250 MG: 1.25 INJECTION, POWDER, LYOPHILIZED, FOR SOLUTION INTRAVENOUS at 03:12

## 2022-12-03 RX ADMIN — Medication 10 ML: at 06:12

## 2022-12-03 RX ADMIN — SODIUM CHLORIDE 1000 MG: 1 TABLET ORAL at 08:12

## 2022-12-03 RX ADMIN — ONDANSETRON 4 MG: 2 INJECTION INTRAMUSCULAR; INTRAVENOUS at 08:12

## 2022-12-03 RX ADMIN — ENOXAPARIN SODIUM 40 MG: 40 INJECTION SUBCUTANEOUS at 04:12

## 2022-12-03 RX ADMIN — CALCIUM CARBONATE (ANTACID) CHEW TAB 500 MG 1000 MG: 500 CHEW TAB at 06:12

## 2022-12-03 RX ADMIN — CEFEPIME 2 G: 2 INJECTION, POWDER, FOR SOLUTION INTRAVENOUS at 12:12

## 2022-12-03 NOTE — RESPIRATORY THERAPY
RAPID RESPONSE RESPIRATORY THERAPY PROACTIVE NOTE           Time of visit: 1035     Code Status: Full Code   : 1950  Bed: 1055/1055 A:   MRN: [id number]  Time spent at the bedside: < 15 min    SITUATION    Evaluated patient for: LDA Check     BACKGROUND    Patient has a past medical history of Hypothyroidism, PEG (percutaneous endoscopic gastrostomy) adjustment/replacement/removal, and Type 2 diabetes mellitus, without long-term current use of insulin.  Clinically Significant Surgical Hx: laryngectomy    24 Hours Vitals Range:  Temp:  [96.6 °F (35.9 °C)-98.4 °F (36.9 °C)]   Pulse:  [69-78]   Resp:  [17-20]   BP: (107-131)/(63-72)   SpO2:  [98 %-99 %]     Labs:    Recent Labs     22  0329 22  0625 22  0520   * 132* 132*   K 3.6 3.7 3.9    102 103   CO2 26 22* 21*   CREATININE 0.9 0.8 0.8   * 190* 174*        No results for input(s): PH, PCO2, PO2, HCO3, POCSATURATED, BE in the last 72 hours.    ASSESSMENT/INTERVENTIONS  Resting comfortably with no respiratory needs at this time      Last VS   Temp: 97.5 °F (36.4 °C) (1510)  Pulse: 78 (1510)  Resp: 17 (1510)  BP: 107/65 (1510)  SpO2: 99 % (1510)      Extra trachs at bedside: y  Level of Consciousness: Level of Consciousness (AVPU): alert  Respiratory Effort: Respiratory Effort: Normal, Unlabored Expansion/Accessory Muscle Usage: Expansion/Accessory Muscles/Retractions: no use of accessory muscles, no retractions, expansion symmetric  All Lung Field Breath Sounds: All Lung Fields Breath Sounds: Anterior:, Lateral:, diminished  O2 Device/Concentration: 5/21%  Surgical airway: Laryngectomy  Ambu at bedside: Ambu bag with the patient?: Yes, Adult Ambu     Active Orders   Respiratory Care    Oxygen Continuous     Frequency: Continuous     Number of Occurrences: Until Specified     Order Questions:      Device type: Low flow      Device: Trach Collar      FiO2%: 28      Titrate O2 per Oxygen Titration  Protocol: Yes      To maintain SpO2 goal of: >= 90%      Notify MD of: Inability to achieve desired SpO2; Sudden change in patient status and requires 20% increase in FiO2; Patient requires >60% FiO2    Routine tracheostomy care     Frequency: Q12H     Number of Occurrences: Until Specified    Tracheostomy replacement Once     Frequency: Once     Number of Occurrences: 1 Occurrences     Order Comments: Patient's laryngectomy stoma trach tube (6-0 Shiley cuffed) replace with new trach of same size 11/27  Please place additional 6-0 Shiley cuffed trach at bedside, MD to replace prn.         RECOMMENDATIONS    We recommend: RRT Recs: Continue POC per primary team.      FOLLOW-UP    Please call back the Rapid Response RT, Antonio Roberts RRT at x [phone] for any questions or concerns.

## 2022-12-03 NOTE — PROGRESS NOTES
Pharmacokinetic Assessment Follow Up: IV Vancomycin    Vancomycin serum concentration assessment(s):    The trough level was drawn correctly and can be used to guide therapy at this time. The measurement is within the desired definitive target range of 15 to 20 mcg/mL.    Vancomycin Regimen Plan:    Continue regimen of Vancomycin 1250 mg IV every 24 hours with next serum trough concentration measured in ~5-7 days or sooner if kidney function changes significantly        Drug levels (last 3 results):  Recent Labs   Lab Result Units 11/30/22  1114 12/02/22  1445   Vancomycin-Trough ug/mL 23.9* 16.1       Pharmacy will continue to follow and monitor vancomycin.    Please contact pharmacy at extension [phone] for questions regarding this assessment.    Thank you for the consult,   Susan Meagan       Patient brief summary:  Cyrus Batres Jr. is a 72 y.o. male initiated on antimicrobial therapy with IV Vancomycin for treatment of bacteremia    The patient's current regimen is 1250mg IV q24h    Drug Allergies:   Review of patient's allergies indicates:   Allergen Reactions    Lovastatin Itching       Actual Body Weight:   62.1 kg    Renal Function:   Estimated Creatinine Clearance: 74.4 mL/min (based on SCr of 0.8 mg/dL).,       CBC (last 72 hours):  Recent Labs   Lab Result Units 12/01/22  0625 12/02/22  0520   WBC K/uL 5.56  5.56 6.89   Hemoglobin g/dL 9.9*  9.9* 9.3*   Hematocrit % 31.6*  31.6* 29.7*   Platelets K/uL 380  380 355   Gran % % 80.1*  80.1* 84.8*   Lymph % % 10.3*  10.3* 6.0*   Mono % % 5.2  5.2 5.5   Eosinophil % % 2.7  2.7 2.3   Basophil % % 0.4  0.4 0.4   Differential Method  Automated  Automated Automated       Metabolic Panel (last 72 hours):  Recent Labs   Lab Result Units 11/30/22  0329 12/01/22  0625 12/02/22  0520   Sodium mmol/L 132* 132* 132*   Potassium mmol/L 3.6 3.7 3.9   Chloride mmol/L 100 102 103   CO2 mmol/L 26 22* 21*   Glucose mg/dL 131* 190* 174*   BUN mg/dL 6* 9 11   Creatinine  mg/dL 0.9 0.8 0.8   Albumin g/dL 2.2* 2.2* 2.1*   Total Bilirubin mg/dL 2.2* 2.0* 1.6*   Alkaline Phosphatase U/L 1,198* 1,274* 1,153*   AST U/L 82* 90* 83*   ALT U/L 131* 132* 118*       Vancomycin Administrations:  vancomycin given in the last 96 hours                     vancomycin 1.25 g in dextrose 5% 250 mL IVPB (ready to mix) (mg) 1,250 mg New Bag 12/02/22 1535     1,250 mg New Bag 12/01/22 1417     1,250 mg New Bag 11/30/22 1512    vancomycin 750 mg in dextrose 5 % 250 mL IVPB (ready to mix system) (mg) 750 mg New Bag 11/30/22 0056     750 mg New Bag 11/29/22 1204     750 mg New Bag 11/28/22 2344                    Microbiologic Results:  Microbiology Results (last 7 days)       Procedure Component Value Units Date/Time    Blood culture [[id number]] Collected: 11/26/22 0831    Order Status: Completed Specimen: Blood from Peripheral, Right Hand Updated: 12/01/22 1212     Blood Culture, Routine No growth after 5 days.    Blood culture [[id number]] Collected: 11/26/22 0832    Order Status: Completed Specimen: Blood from Peripheral, Left Hand Updated: 12/01/22 1212     Blood Culture, Routine No growth after 5 days.

## 2022-12-03 NOTE — PLAN OF CARE
POC is reviewed and understood by patient. VSS while on room air. Uses urinal at bedside. No c/o pain, nausea. Wife at bedside. No sign of distress noted. HOB elevated. Bed in lowest position. Call bell in reach.

## 2022-12-03 NOTE — PROGRESS NOTES
Nael Carey - [facility]  Otorhinolaryngology-Head & Neck Surgery  Progress Note    Subjective:     Post-Op Info:  * No surgery found *      Hospital Day: 11     Interval History: No issues overnight. Patient tolerating continuous feeds but does not want to try bolus today. Less drainage from neck incision today.    Medications:  Continuous Infusions:   lactated ringers 50 mL/hr at 12/03/22 1234     Scheduled Meds:   calcitrioL  0.25 mcg Per G Tube Daily    calcium carbonate  1,000 mg Per G Tube 5x Daily    ceFEPime (MAXIPIME) IVPB EXTENDED INFUSION  2 g Intravenous Q8H    enoxaparin  40 mg Subcutaneous Daily    micafungin (MYCAMINE) IVPB  100 mg Intravenous Q24H    pantoprazole  40 mg Intravenous Daily    polyethylene glycol  17 g Per G Tube Daily    sodium chloride 0.9%  10 mL Intravenous Q6H    sodium chloride  1,000 mg Oral Daily    vancomycin (VANCOCIN) IVPB  1,250 mg Intravenous Q24H     PRN Meds:acetaminophen, dextrose 10%, dextrose 10%, glucagon (human recombinant), hydrALAZINE, insulin aspart U-100, ondansetron, oxyCODONE, promethazine (PHENERGAN) IVPB, senna-docusate 8.6-50 mg, sodium chloride 0.9%, Flushing PICC Protocol **AND** sodium chloride 0.9% **AND** sodium chloride 0.9%     Review of patient's allergies indicates:   Allergen Reactions    Lovastatin Itching     Objective:     Vital Signs (24h Range):  Temp:  [97.5 °F (36.4 °C)-98.2 °F (36.8 °C)] 97.6 °F (36.4 °C)  Pulse:  [69-78] 72  Resp:  [16-18] 18  SpO2:  [96 %-99 %] 96 %  BP: (107-126)/(62-76) 119/64     Date 12/03/22 0700 - 12/04/22 0659   Shift 2939-8590 1917-6616 4760-8070 24 Hour Total   INTAKE   I.V.(mL/kg) 856.7(13.8)   856.7(13.8)   NG/   100   Shift Total(mL/kg) 956.7(15.4)   956.7(15.4)   OUTPUT   Urine(mL/kg/hr) 400   400   Shift Total(mL/kg) 400(6.4)   400(6.4)   Weight (kg) 62.1 62.1 62.1 62.1     Lines/Drains/Airways       Peripherally Inserted Central Catheter Line  Duration             PICC Double Lumen 12/01/22 1017  Pt prepared for surgery.   "right brachial 2 days              Drain  Duration                  Gastrostomy/Enterostomy LUQ -- days              Airway  Duration             Adult Surgical Airway Shiley Cuffed -- days         Laryngectomy (Do Not Remove) 11/09/22 Laryngectomy stoma 24 days              Peripheral Intravenous Line  Duration                  Peripheral IV - Single Lumen 11/28/22 1311 20 G;1 3/4 in Left;Anterior Forearm 5 days                    Physical Exam  NAD  Awake and alert  Mild facial edema   Trach within stoma, removed. Stoma appears healthy  Skin graft to midline neck overlying flap, at left superior portion small area of breakdown though without drainage and remains with adequate coverage  Small area of dehiscence left laterally, near area of STSG but dry  Left area with packing in place, removed  Intraoral flap is soft without fluctuance, secretions thin, clear intraorally    Significant Labs:  CBC:   Recent Labs   Lab 12/03/22  0455   WBC 6.35   RBC 3.07*   HGB 9.2*   HCT 29.6*      MCV 96   MCH 30.0   MCHC 31.1*     CMP:   Recent Labs   Lab 12/03/22  0455   *   CALCIUM 7.2*   ALBUMIN 2.3*   PROT 5.8*   *   K 4.0   CO2 21*      BUN 14   CREATININE 0.7   ALKPHOS 1,065*   *   AST 67*   BILITOT 1.6*       Significant Diagnostics:  None    Assessment/Plan:     Laryngeal cancer  72 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    Currently with bacteremia and fungemia, port removed and ID recommending IV antibiotics at home. PICC placed 12/1      ENT/HNS:  - Laryngectomy protocol   -Sign above bed + outside door stating patient is "neck breather" and "can not be intubated by mouth"   -Clean laryngectomy stoma as needed, or daily  - Keep tracheostomy in place     - OK to replace tracheostomy if dislodged, only in place for swelling/secretion assistance -- replaced " 11/27  - Humidified O2 via trach collar  - CT with fluid collection, will continue packing changes      Neuro:   - Pain: Multimodality PRN regimen initiated     Cardiac:  - HDS  - Page ENT before starting vasopressors  - H/O of paroxysmal a fib, will CTM      Pulmonary:   - PRN breathing treatments  - Humidified air per trach collar  - OK to remove tracheostomy for adequate suctioning     Renal:   - monitor Is and Os  - Cr stable     Infectious Disease:  - Leukocytosis and BC positive for pseudomonas -- now improved WBC  - ID consulted; appreciate recommendations   - TTE unremarkable    - Continue daily packing changes   - Antibiotics per ID team; to end 12/11; PICC line placed   Antibiotics (From admission, onward)    Start     Stop Route Frequency Ordered    11/30/22 1500  vancomycin 1.25 g in dextrose 5% 250 mL IVPB (ready to mix)         -- IV Every 24 hours (non-standard times) 11/30/22 1220    11/29/22 2200  cefepime in dextrose 5 % IVPB 2 g         -- IV Every 8 hours (non-standard times) 11/29/22 1545    11/24/22 0900  mupirocin 2 % ointment         11/29 0859 Nasl 2 times daily 11/24/22 0319           Hematology/Oncology:  - H/H stable  - Lovenox DVT prophylaxis      FEN/GI  - Transaminitis and increased alk phos   - U/S of abdomen + liver dopppler     -- biliary sludge, otherwise unremarkable; doppler wnl   - Medicine consulted; appreciate recs     - CRP, ESR elevated     - Autoimmune workup negative      - Hepatitis screen negative   - Continue advancing TFs to goal  - Postoperative hypoparathyroidism   - Hypocalcemia on initial admission     - Continue tums QID, calcitriol   - Replace electrolytes as needed to keep K>4, Mg>2 --repleted K+ 11/27  - Bowel reg  - Protonix for GI prophylaxis, GERD  - STRICT nausea control ondansetron, phenergan       MSK  - Out of bed as tolerated  - STSG donor site healed     Dispo:  - Continue hospital stay, pending arrangement of HH for tube feeds, trach supplies, and  antibiotics          Henna Mckoy MD  Otorhinolaryngology-Head & Neck Surgery  [facility]  [place]

## 2022-12-03 NOTE — SUBJECTIVE & OBJECTIVE
Interval History: No issues overnight. Patient tolerating continuous feeds but does not want to try bolus today. Less drainage from neck incision today.    Medications:  Continuous Infusions:   lactated ringers 50 mL/hr at 12/03/22 1234     Scheduled Meds:   calcitrioL  0.25 mcg Per G Tube Daily    calcium carbonate  1,000 mg Per G Tube 5x Daily    ceFEPime (MAXIPIME) IVPB EXTENDED INFUSION  2 g Intravenous Q8H    enoxaparin  40 mg Subcutaneous Daily    micafungin (MYCAMINE) IVPB  100 mg Intravenous Q24H    pantoprazole  40 mg Intravenous Daily    polyethylene glycol  17 g Per G Tube Daily    sodium chloride 0.9%  10 mL Intravenous Q6H    sodium chloride  1,000 mg Oral Daily    vancomycin (VANCOCIN) IVPB  1,250 mg Intravenous Q24H     PRN Meds:acetaminophen, dextrose 10%, dextrose 10%, glucagon (human recombinant), hydrALAZINE, insulin aspart U-100, ondansetron, oxyCODONE, promethazine (PHENERGAN) IVPB, senna-docusate 8.6-50 mg, sodium chloride 0.9%, Flushing PICC Protocol **AND** sodium chloride 0.9% **AND** sodium chloride 0.9%     Review of patient's allergies indicates:   Allergen Reactions    Lovastatin Itching     Objective:     Vital Signs (24h Range):  Temp:  [97.5 °F (36.4 °C)-98.2 °F (36.8 °C)] 97.6 °F (36.4 °C)  Pulse:  [69-78] 72  Resp:  [16-18] 18  SpO2:  [96 %-99 %] 96 %  BP: (107-126)/(62-76) 119/64     Date 12/03/22 0700 - 12/04/22 0659   Shift 0166-5806 1953-9685 9980-3144 24 Hour Total   INTAKE   I.V.(mL/kg) 856.7(13.8)   856.7(13.8)   NG/   100   Shift Total(mL/kg) 956.7(15.4)   956.7(15.4)   OUTPUT   Urine(mL/kg/hr) 400   400   Shift Total(mL/kg) 400(6.4)   400(6.4)   Weight (kg) 62.1 62.1 62.1 62.1     Lines/Drains/Airways       Peripherally Inserted Central Catheter Line  Duration             PICC Double Lumen 12/01/22 1017 right brachial 2 days              Drain  Duration                  Gastrostomy/Enterostomy LUQ -- days              Airway  Duration             Adult Surgical Airway  [place] Cuffed -- days         Laryngectomy (Do Not Remove) 11/09/22 Laryngectomy stoma 24 days              Peripheral Intravenous Line  Duration                  Peripheral IV - Single Lumen 11/28/22 1311 20 G;1 3/4 in Left;Anterior Forearm 5 days                    Physical Exam  NAD  Awake and alert  Mild facial edema   Trach within stoma, removed. Stoma appears healthy  Skin graft to midline neck overlying flap, at left superior portion small area of breakdown though without drainage and remains with adequate coverage  Small area of dehiscence left laterally, near area of STSG but dry  Left area with packing in place, removed  Intraoral flap is soft without fluctuance, secretions thin, clear intraorally    Significant Labs:  CBC:   Recent Labs   Lab 12/03/22  0455   WBC 6.35   RBC 3.07*   HGB 9.2*   HCT 29.6*      MCV 96   MCH 30.0   MCHC 31.1*     CMP:   Recent Labs   Lab 12/03/22  0455   *   CALCIUM 7.2*   ALBUMIN 2.3*   PROT 5.8*   *   K 4.0   CO2 21*      BUN 14   CREATININE 0.7   ALKPHOS 1,065*   *   AST 67*   BILITOT 1.6*       Significant Diagnostics:  None

## 2022-12-04 LAB
POCT GLUCOSE: 193 MG/DL (ref 70–110)
POCT GLUCOSE: 215 MG/DL (ref 70–110)
POCT GLUCOSE: 217 MG/DL (ref 70–110)

## 2022-12-04 PROCEDURE — 25000242 PHARM REV CODE 250 ALT 637 W/ HCPCS: Performed by: STUDENT IN AN ORGANIZED HEALTH CARE EDUCATION/TRAINING PROGRAM

## 2022-12-04 PROCEDURE — 27000221 HC OXYGEN, UP TO 24 HOURS

## 2022-12-04 PROCEDURE — 25000003 PHARM REV CODE 250: Performed by: STUDENT IN AN ORGANIZED HEALTH CARE EDUCATION/TRAINING PROGRAM

## 2022-12-04 PROCEDURE — 20600001 HC STEP DOWN PRIVATE ROOM

## 2022-12-04 PROCEDURE — 99900035 HC TECH TIME PER 15 MIN (STAT)

## 2022-12-04 PROCEDURE — 25000003 PHARM REV CODE 250: Performed by: OTOLARYNGOLOGY

## 2022-12-04 PROCEDURE — 63600175 PHARM REV CODE 636 W HCPCS: Mod: JG | Performed by: STUDENT IN AN ORGANIZED HEALTH CARE EDUCATION/TRAINING PROGRAM

## 2022-12-04 PROCEDURE — 63600175 PHARM REV CODE 636 W HCPCS: Performed by: STUDENT IN AN ORGANIZED HEALTH CARE EDUCATION/TRAINING PROGRAM

## 2022-12-04 PROCEDURE — 63600175 PHARM REV CODE 636 W HCPCS: Performed by: OTOLARYNGOLOGY

## 2022-12-04 PROCEDURE — A4216 STERILE WATER/SALINE, 10 ML: HCPCS | Performed by: OTOLARYNGOLOGY

## 2022-12-04 PROCEDURE — 63700000 PHARM REV CODE 250 ALT 637 W/O HCPCS: Performed by: STUDENT IN AN ORGANIZED HEALTH CARE EDUCATION/TRAINING PROGRAM

## 2022-12-04 PROCEDURE — 25000003 PHARM REV CODE 250

## 2022-12-04 PROCEDURE — 63600175 PHARM REV CODE 636 W HCPCS

## 2022-12-04 PROCEDURE — 99900026 HC AIRWAY MAINTENANCE (STAT)

## 2022-12-04 PROCEDURE — 94761 N-INVAS EAR/PLS OXIMETRY MLT: CPT

## 2022-12-04 PROCEDURE — C9113 INJ PANTOPRAZOLE SODIUM, VIA: HCPCS | Performed by: STUDENT IN AN ORGANIZED HEALTH CARE EDUCATION/TRAINING PROGRAM

## 2022-12-04 RX ADMIN — ENOXAPARIN SODIUM 40 MG: 40 INJECTION SUBCUTANEOUS at 04:12

## 2022-12-04 RX ADMIN — CALCIUM CARBONATE (ANTACID) CHEW TAB 500 MG 1000 MG: 500 CHEW TAB at 02:12

## 2022-12-04 RX ADMIN — OXYCODONE HYDROCHLORIDE 5 MG: 5 SOLUTION ORAL at 10:12

## 2022-12-04 RX ADMIN — POLYETHYLENE GLYCOL 3350 17 G: 17 POWDER, FOR SOLUTION ORAL at 08:12

## 2022-12-04 RX ADMIN — CALCIUM CARBONATE (ANTACID) CHEW TAB 500 MG 1000 MG: 500 CHEW TAB at 06:12

## 2022-12-04 RX ADMIN — MICAFUNGIN SODIUM 100 MG: 100 INJECTION, POWDER, LYOPHILIZED, FOR SOLUTION INTRAVENOUS at 10:12

## 2022-12-04 RX ADMIN — INSULIN ASPART 4 UNITS: 100 INJECTION, SOLUTION INTRAVENOUS; SUBCUTANEOUS at 06:12

## 2022-12-04 RX ADMIN — Medication 10 ML: at 12:12

## 2022-12-04 RX ADMIN — PANTOPRAZOLE SODIUM 40 MG: 40 INJECTION, POWDER, FOR SOLUTION INTRAVENOUS at 08:12

## 2022-12-04 RX ADMIN — CEFEPIME 2 G: 2 INJECTION, POWDER, FOR SOLUTION INTRAVENOUS at 03:12

## 2022-12-04 RX ADMIN — VANCOMYCIN HYDROCHLORIDE 1250 MG: 1.25 INJECTION, POWDER, LYOPHILIZED, FOR SOLUTION INTRAVENOUS at 03:12

## 2022-12-04 RX ADMIN — CEFEPIME 2 G: 2 INJECTION, POWDER, FOR SOLUTION INTRAVENOUS at 08:12

## 2022-12-04 RX ADMIN — CALCIUM CARBONATE (ANTACID) CHEW TAB 500 MG 1000 MG: 500 CHEW TAB at 09:12

## 2022-12-04 RX ADMIN — Medication 10 ML: at 08:12

## 2022-12-04 RX ADMIN — CALCITRIOL 0.25 MCG: 1 SOLUTION ORAL at 08:12

## 2022-12-04 RX ADMIN — SODIUM CHLORIDE 1000 MG: 1 TABLET ORAL at 08:12

## 2022-12-04 RX ADMIN — Medication 10 ML: at 06:12

## 2022-12-04 RX ADMIN — INSULIN ASPART 4 UNITS: 100 INJECTION, SOLUTION INTRAVENOUS; SUBCUTANEOUS at 12:12

## 2022-12-04 RX ADMIN — CALCIUM CARBONATE (ANTACID) CHEW TAB 500 MG 1000 MG: 500 CHEW TAB at 10:12

## 2022-12-04 RX ADMIN — CEFEPIME 2 G: 2 INJECTION, POWDER, FOR SOLUTION INTRAVENOUS at 12:12

## 2022-12-04 NOTE — SUBJECTIVE & OBJECTIVE
Interval History: Patient with large volume emesis yesterday. He reports having regular BM, denies any abdominal pain or discomfort.    Medications:  Continuous Infusions:   lactated ringers 50 mL/hr at 12/03/22 1234     Scheduled Meds:   calcitrioL  0.25 mcg Per G Tube Daily    calcium carbonate  1,000 mg Per G Tube 5x Daily    ceFEPime (MAXIPIME) IVPB EXTENDED INFUSION  2 g Intravenous Q8H    enoxaparin  40 mg Subcutaneous Daily    micafungin (MYCAMINE) IVPB  100 mg Intravenous Q24H    pantoprazole  40 mg Intravenous Daily    polyethylene glycol  17 g Per G Tube Daily    sodium chloride 0.9%  10 mL Intravenous Q6H    vancomycin (VANCOCIN) IVPB  1,250 mg Intravenous Q24H     PRN Meds:acetaminophen, dextrose 10%, dextrose 10%, glucagon (human recombinant), hydrALAZINE, insulin aspart U-100, ondansetron, oxyCODONE, promethazine (PHENERGAN) IVPB, senna-docusate 8.6-50 mg, sodium chloride 0.9%, Flushing PICC Protocol **AND** sodium chloride 0.9% **AND** sodium chloride 0.9%     Review of patient's allergies indicates:   Allergen Reactions    Lovastatin Itching     Objective:     Vital Signs (24h Range):  Temp:  [96.3 °F (35.7 °C)-99 °F (37.2 °C)] 96.3 °F (35.7 °C)  Pulse:  [74-98] 77  Resp:  [16-20] 20  SpO2:  [97 %-99 %] 97 %  BP: (107-149)/(59-81) 108/59     Date 12/04/22 0700 - 12/05/22 0659   Shift 6354-0478 1307-7736 3547-7144 24 Hour Total   INTAKE   NG/GT 30   30   Shift Total(mL/kg) 30(0.5)   30(0.5)   OUTPUT   Shift Total(mL/kg)       Weight (kg) 62.1 62.1 62.1 62.1     Lines/Drains/Airways       Peripherally Inserted Central Catheter Line  Duration             PICC Double Lumen 12/01/22 1017 right brachial 3 days              Drain  Duration                  Gastrostomy/Enterostomy LUQ -- days              Airway  Duration             Adult Surgical Airway Shiley Cuffed -- days         Laryngectomy (Do Not Remove) 11/09/22 Laryngectomy stoma 25 days              Peripheral Intravenous Line  Duration                   Peripheral IV - Single Lumen 11/28/22 1311 20 G;1 3/4 in Left;Anterior Forearm 5 days                    Physical Exam  NAD  Awake and alert  Mild facial edema   Trach within stoma, removed. Stoma appears healthy  Skin graft to midline neck overlying flap  Small area of dehiscence left laterally, near area of STSG  Left area with packing in place, removed  Intraoral flap is soft without fluctuance, secretions thin, clear intraorally    Significant Labs:  CBC:   Recent Labs   Lab 12/03/22  0455   WBC 6.35   RBC 3.07*   HGB 9.2*   HCT 29.6*      MCV 96   MCH 30.0   MCHC 31.1*     CMP:   Recent Labs   Lab 12/03/22  0455   *   CALCIUM 7.2*   ALBUMIN 2.3*   PROT 5.8*   *   K 4.0   CO2 21*      BUN 14   CREATININE 0.7   ALKPHOS 1,065*   *   AST 67*   BILITOT 1.6*       Significant Diagnostics:  I have reviewed and interpreted all pertinent imaging results/findings within the past 24 hours.

## 2022-12-04 NOTE — NURSING
Spoke with  and received order to hold tube feeding tonight and restart tomorrow morning,will continue to monitor.

## 2022-12-04 NOTE — ASSESSMENT & PLAN NOTE
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    Currently with bacteremia and fungemia, port removed and ID recommending IV antibiotics at home. PICC placed 12/1      ENT/HNS:  - Laryngectomy protocol   -Sign above bed + outside door stating patient is "neck breather" and "can not be intubated by mouth"   -Clean laryngectomy stoma as needed, or daily  - Keep tracheostomy in place     - OK to replace tracheostomy if dislodged, only in place for swelling/secretion assistance -- replaced 11/27  - Humidified O2 via trach collar  - CT with fluid collection, will continue packing changes      Neuro:   - Pain: Multimodality PRN regimen initiated     Cardiac:  - HDS  - H/O of paroxysmal a fib, will CTM      Pulmonary:   - PRN breathing treatments  - Humidified air per trach collar  - OK to remove tracheostomy for adequate suctioning     Renal:   - monitor Is and Os  - Cr stable     Infectious Disease:  - Leukocytosis and BC positive for pseudomonas -- now improved WBC  - ID consulted; appreciate recommendations   - TTE unremarkable    - Continue daily packing changes   - Antibiotics per ID team; to end 12/11; PICC line placed   Antibiotics (From admission, onward)    Start     Stop Route Frequency Ordered    11/30/22 1500  vancomycin 1.25 g in dextrose 5% 250 mL IVPB (ready to mix)         -- IV Every 24 hours (non-standard times) 11/30/22 1220    11/29/22 2200  cefepime in dextrose 5 % IVPB 2 g         -- IV Every 8 hours (non-standard times) 11/29/22 1545    11/24/22 0900  mupirocin 2 % ointment         11/29 0859 Nasl 2 times daily 11/24/22 0319           Hematology/Oncology:  - H/H stable  - Lovenox DVT prophylaxis      FEN/GI  - Transaminitis and increased alk phos   - U/S of abdomen + liver dopppler     -- biliary sludge, otherwise unremarkable; doppler wnl   - Medicine consulted; " appreciate recs     - CRP, ESR elevated     - Autoimmune workup negative      - Hepatitis screen negative   - Continue advancing TFs to goal  - Postoperative hypoparathyroidism   - Hypocalcemia on initial admission     - Continue tums QID, calcitriol   - Replace electrolytes as needed to keep K>4, Mg>2 --repleted K+ 11/27  - Bowel reg  - Protonix for GI prophylaxis, GERD  - STRICT nausea control ondansetron, phenergan  - KUB to rule out ileus given emesis       MSK  - Out of bed as tolerated  - STSG donor site healed     Dispo:  - Continue hospital stay, pending arrangement of HH for tube feeds, trach supplies, and antibiotics

## 2022-12-04 NOTE — RESPIRATORY THERAPY
RAPID RESPONSE RESPIRATORY THERAPY PROACTIVE NOTE           Time of visit: 903     Code Status: Full Code   : 1950  Bed: 1055/1055 A:   MRN: [id number]  Time spent at the bedside: < 15 min    SITUATION    Evaluated patient for: LDA Check     BACKGROUND    Patient has a past medical history of Hypothyroidism, PEG (percutaneous endoscopic gastrostomy) adjustment/replacement/removal, and Type 2 diabetes mellitus, without long-term current use of insulin.  Clinically Significant Surgical Hx: laryngectomy    24 Hours Vitals Range:  Temp:  [96.3 °F (35.7 °C)-99 °F (37.2 °C)]   Pulse:  [74-98]   Resp:  [16-20]   BP: (107-149)/(59-81)   SpO2:  [97 %-99 %]     Labs:    Recent Labs     22  0520 22  0455   * 131*   K 3.9 4.0    103   CO2 21* 21*   CREATININE 0.8 0.7   * 120*        No results for input(s): PH, PCO2, PO2, HCO3, POCSATURATED, BE in the last 72 hours.    ASSESSMENT/INTERVENTIONS  Upon arrival patient awake alert and looked great. Obturator HOB. Patient is a full laryngectomy but has trach in place. All airway safety supplies bedside.       Last VS   Temp: 96.3 °F (35.7 °C) ( 1117)  Pulse: 78 ( 1224)  Resp: 20 ( 1224)  BP: 108/59 ( 1117)  SpO2: 97 % ( 1224)      Extra trachs at bedside: Yes as well as ET tubes and infant mask  Level of Consciousness: Level of Consciousness (AVPU): alert  Respiratory Effort: Respiratory Effort: Normal, Unlabored Expansion/Accessory Muscle Usage: Expansion/Accessory Muscles/Retractions: no use of accessory muscles, no retractions, expansion symmetric  All Lung Field Breath Sounds: All Lung Fields Breath Sounds: Anterior:, Lateral:, diminished  O2 Device/Concentration: Trach collar 5L/21%  Surgical airway: Yes, Type: Shiley Size: 6, cuffed  Ambu at bedside: Ambu bag with the patient?: Yes, Adult Ambu     Active Orders   Respiratory Care    Oxygen Continuous     Frequency: Continuous     Number of Occurrences: Until  Specified     Order Questions:      Device type: Low flow      Device: Trach Collar      FiO2%: 28      Titrate O2 per Oxygen Titration Protocol: Yes      To maintain SpO2 goal of: >= 90%      Notify MD of: Inability to achieve desired SpO2; Sudden change in patient status and requires 20% increase in FiO2; Patient requires >60% FiO2    Routine tracheostomy care     Frequency: Q12H     Number of Occurrences: Until Specified    Tracheostomy replacement Once     Frequency: Once     Number of Occurrences: 1 Occurrences     Order Comments: Patient's laryngectomy stoma trach tube (6-0 Shiley cuffed) replace with new trach of same size 11/27  Please place additional 6-0 Shiley cuffed trach at bedside, MD to replace prn.         RECOMMENDATIONS    We recommend: RRT Recs: Continue POC per primary team.      FOLLOW-UP    Please call back the Rapid Response RT, Tomeka Jacobs, RRT at x [phone] for any questions or concerns.

## 2022-12-04 NOTE — PROGRESS NOTES
Nael Carey - [facility]  Otorhinolaryngology-Head & Neck Surgery  Progress Note    Subjective:     Post-Op Info:  * No surgery found *      Hospital Day: 12     Interval History: Patient with large volume emesis yesterday. He reports having regular BM, denies any abdominal pain or discomfort.    Medications:  Continuous Infusions:   lactated ringers 50 mL/hr at 12/03/22 1234     Scheduled Meds:   calcitrioL  0.25 mcg Per G Tube Daily    calcium carbonate  1,000 mg Per G Tube 5x Daily    ceFEPime (MAXIPIME) IVPB EXTENDED INFUSION  2 g Intravenous Q8H    enoxaparin  40 mg Subcutaneous Daily    micafungin (MYCAMINE) IVPB  100 mg Intravenous Q24H    pantoprazole  40 mg Intravenous Daily    polyethylene glycol  17 g Per G Tube Daily    sodium chloride 0.9%  10 mL Intravenous Q6H    vancomycin (VANCOCIN) IVPB  1,250 mg Intravenous Q24H     PRN Meds:acetaminophen, dextrose 10%, dextrose 10%, glucagon (human recombinant), hydrALAZINE, insulin aspart U-100, ondansetron, oxyCODONE, promethazine (PHENERGAN) IVPB, senna-docusate 8.6-50 mg, sodium chloride 0.9%, Flushing PICC Protocol **AND** sodium chloride 0.9% **AND** sodium chloride 0.9%     Review of patient's allergies indicates:   Allergen Reactions    Lovastatin Itching     Objective:     Vital Signs (24h Range):  Temp:  [96.3 °F (35.7 °C)-99 °F (37.2 °C)] 96.3 °F (35.7 °C)  Pulse:  [74-98] 77  Resp:  [16-20] 20  SpO2:  [97 %-99 %] 97 %  BP: (107-149)/(59-81) 108/59     Date 12/04/22 0700 - 12/05/22 0659   Shift 5685-1165 2255-5194 6762-9277 24 Hour Total   INTAKE   NG/GT 30   30   Shift Total(mL/kg) 30(0.5)   30(0.5)   OUTPUT   Shift Total(mL/kg)       Weight (kg) 62.1 62.1 62.1 62.1     Lines/Drains/Airways       Peripherally Inserted Central Catheter Line  Duration             PICC Double Lumen 12/01/22 1017 right brachial 3 days              Drain  Duration                  Gastrostomy/Enterostomy LUQ -- days              Airway  Duration             Adult  "Surgical Airway Shiley Cuffed -- days         Laryngectomy (Do Not Remove) 11/09/22 Laryngectomy stoma 25 days              Peripheral Intravenous Line  Duration                  Peripheral IV - Single Lumen 11/28/22 1311 20 G;1 3/4 in Left;Anterior Forearm 5 days                    Physical Exam  NAD  Awake and alert  Mild facial edema   Trach within stoma, removed. Stoma appears healthy  Skin graft to midline neck overlying flap  Small area of dehiscence left laterally, near area of STSG  Left area with packing in place, removed  Intraoral flap is soft without fluctuance, secretions thin, clear intraorally    Significant Labs:  CBC:   Recent Labs   Lab 12/03/22  0455   WBC 6.35   RBC 3.07*   HGB 9.2*   HCT 29.6*      MCV 96   MCH 30.0   MCHC 31.1*     CMP:   Recent Labs   Lab 12/03/22  0455   *   CALCIUM 7.2*   ALBUMIN 2.3*   PROT 5.8*   *   K 4.0   CO2 21*      BUN 14   CREATININE 0.7   ALKPHOS 1,065*   *   AST 67*   BILITOT 1.6*       Significant Diagnostics:  I have reviewed and interpreted all pertinent imaging results/findings within the past 24 hours.    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    Currently with bacteremia and fungemia, port removed and ID recommending IV antibiotics at home. PICC placed 12/1      ENT/HNS:  - Laryngectomy protocol   -Sign above bed + outside door stating patient is "neck breather" and "can not be intubated by mouth"   -Clean laryngectomy stoma as needed, or daily  - Keep tracheostomy in place     - OK to replace tracheostomy if dislodged, only in place for swelling/secretion assistance -- replaced 11/27  - Humidified O2 via trach collar  - CT with fluid collection, will continue packing changes      Neuro:   - Pain: Multimodality PRN regimen initiated     Cardiac:  - " HDS  - H/O of paroxysmal a fib, will CTM      Pulmonary:   - PRN breathing treatments  - Humidified air per trach collar  - OK to remove tracheostomy for adequate suctioning     Renal:   - monitor Is and Os  - Cr stable     Infectious Disease:  - Leukocytosis and BC positive for pseudomonas -- now improved WBC  - ID consulted; appreciate recommendations   - TTE unremarkable    - Continue daily packing changes   - Antibiotics per ID team; to end 12/11; PICC line placed   Antibiotics (From admission, onward)    Start     Stop Route Frequency Ordered    11/30/22 1500  vancomycin 1.25 g in dextrose 5% 250 mL IVPB (ready to mix)         -- IV Every 24 hours (non-standard times) 11/30/22 1220    11/29/22 2200  cefepime in dextrose 5 % IVPB 2 g         -- IV Every 8 hours (non-standard times) 11/29/22 1545    11/24/22 0900  mupirocin 2 % ointment         11/29 0859 Nasl 2 times daily 11/24/22 0319           Hematology/Oncology:  - H/H stable  - Lovenox DVT prophylaxis      FEN/GI  - Transaminitis and increased alk phos   - U/S of abdomen + liver dopppler     -- biliary sludge, otherwise unremarkable; doppler wnl   - Medicine consulted; appreciate recs     - CRP, ESR elevated     - Autoimmune workup negative      - Hepatitis screen negative   - Continue advancing TFs to goal  - Postoperative hypoparathyroidism   - Hypocalcemia on initial admission     - Continue tums QID, calcitriol   - Replace electrolytes as needed to keep K>4, Mg>2 --repleted K+ 11/27  - Bowel reg  - Protonix for GI prophylaxis, GERD  - STRICT nausea control ondansetron, phenergan  - KUB to rule out ileus given emesis       MSK  - Out of bed as tolerated  - STSG donor site healed     Dispo:  - Continue hospital stay, pending arrangement of  for tube feeds, trach supplies, and antibiotics          Henna Mckoy MD  Otorhinolaryngology-Head & Neck Surgery  [facility]

## 2022-12-04 NOTE — NURSING
Patient had some nausea and vomiting,large amount of emesis from tracheostomy and mouth,suctioned,Zofran 4 mg given per I.V.,30 ml of residual noted from tube feeding,head of bed elevated,tube feeding on hold,crackling noted bilateral chest,will continue to monitor and notified doctor.

## 2022-12-04 NOTE — PLAN OF CARE
Pt alert and oriented x 4, no distress, no complaints of pain. TF re-started at 0800 as of now no complaints of nausea or emesis, pt continues on LR at 50 ml/hr, pt ambulatory, spouse at bedside, call light in reach, bed in low position.

## 2022-12-04 NOTE — NURSING
B-received report on 72 years old male alert,orient,lying in bed on his back with head of the bed elevated,communicates with writing board,tracheostomy midline with 02@ 5l/min per tracheostomy collar,21% humidified air,breath sounds clear bilateral,tube feeding,Peptanen 1.5 infusing @ 50 ml/hr,tolerating well at present time,bowel sounds active,pedal pulses positive,no signs of hypo/hyperglycemia,will continue to monitor.  I-assess for safety/fall prevention,assess for pain/discomfort,assess for signs of infection,assess for ineffective breathing pattern,rate,rhythm,throughout shift,assess for signs of hypo/hyperglycemia.  R-no falls,adherence to taking medications as prescribed by doctor/CRNP,pain will remain 0 on a pain scale of 0-10,remain infection free/afebrile,breathing pattern,rhythm rate will remain within normal,blood glucose will remain within normal.  P-continue with current plan of care.

## 2022-12-04 NOTE — PLAN OF CARE
Problem: Adult Inpatient Plan of Care  Goal: Plan of Care Review  Outcome: Ongoing, Progressing     Problem: Diabetes Comorbidity  Goal: Blood Glucose Level Within Targeted Range  Outcome: Ongoing, Progressing     Problem: Infection  Goal: Absence of Infection Signs and Symptoms  Outcome: Ongoing, Progressing     Problem: Fall Injury Risk  Goal: Absence of Fall and Fall-Related Injury  Outcome: Ongoing, Progressing

## 2022-12-05 ENCOUNTER — TELEPHONE (OUTPATIENT)
Dept: HEMATOLOGY/ONCOLOGY | Facility: CLINIC | Age: 72
End: 2022-12-05

## 2022-12-05 LAB
POCT GLUCOSE: 142 MG/DL (ref 70–110)
POCT GLUCOSE: 241 MG/DL (ref 70–110)

## 2022-12-05 PROCEDURE — 25000003 PHARM REV CODE 250: Performed by: OTOLARYNGOLOGY

## 2022-12-05 PROCEDURE — C9113 INJ PANTOPRAZOLE SODIUM, VIA: HCPCS | Performed by: STUDENT IN AN ORGANIZED HEALTH CARE EDUCATION/TRAINING PROGRAM

## 2022-12-05 PROCEDURE — 94761 N-INVAS EAR/PLS OXIMETRY MLT: CPT

## 2022-12-05 PROCEDURE — 25000003 PHARM REV CODE 250: Performed by: STUDENT IN AN ORGANIZED HEALTH CARE EDUCATION/TRAINING PROGRAM

## 2022-12-05 PROCEDURE — 63600175 PHARM REV CODE 636 W HCPCS: Performed by: OTOLARYNGOLOGY

## 2022-12-05 PROCEDURE — A4216 STERILE WATER/SALINE, 10 ML: HCPCS | Performed by: OTOLARYNGOLOGY

## 2022-12-05 PROCEDURE — 20600001 HC STEP DOWN PRIVATE ROOM

## 2022-12-05 PROCEDURE — 63600175 PHARM REV CODE 636 W HCPCS: Performed by: STUDENT IN AN ORGANIZED HEALTH CARE EDUCATION/TRAINING PROGRAM

## 2022-12-05 PROCEDURE — 25000242 PHARM REV CODE 250 ALT 637 W/ HCPCS: Performed by: STUDENT IN AN ORGANIZED HEALTH CARE EDUCATION/TRAINING PROGRAM

## 2022-12-05 PROCEDURE — 99900035 HC TECH TIME PER 15 MIN (STAT)

## 2022-12-05 PROCEDURE — 63700000 PHARM REV CODE 250 ALT 637 W/O HCPCS: Performed by: STUDENT IN AN ORGANIZED HEALTH CARE EDUCATION/TRAINING PROGRAM

## 2022-12-05 PROCEDURE — 63600175 PHARM REV CODE 636 W HCPCS: Mod: JG | Performed by: STUDENT IN AN ORGANIZED HEALTH CARE EDUCATION/TRAINING PROGRAM

## 2022-12-05 PROCEDURE — 27200966 HC CLOSED SUCTION SYSTEM

## 2022-12-05 PROCEDURE — 99900026 HC AIRWAY MAINTENANCE (STAT)

## 2022-12-05 PROCEDURE — 63600175 PHARM REV CODE 636 W HCPCS

## 2022-12-05 RX ORDER — DEXTROSE MONOHYDRATE AND SODIUM CHLORIDE 5; .9 G/100ML; G/100ML
INJECTION, SOLUTION INTRAVENOUS CONTINUOUS
Status: DISCONTINUED | OUTPATIENT
Start: 2022-12-05 | End: 2022-12-08

## 2022-12-05 RX ADMIN — Medication 10 ML: at 09:12

## 2022-12-05 RX ADMIN — VANCOMYCIN HYDROCHLORIDE 1250 MG: 1.25 INJECTION, POWDER, LYOPHILIZED, FOR SOLUTION INTRAVENOUS at 03:12

## 2022-12-05 RX ADMIN — Medication 10 ML: at 12:12

## 2022-12-05 RX ADMIN — MICAFUNGIN SODIUM 100 MG: 100 INJECTION, POWDER, LYOPHILIZED, FOR SOLUTION INTRAVENOUS at 10:12

## 2022-12-05 RX ADMIN — INSULIN ASPART 4 UNITS: 100 INJECTION, SOLUTION INTRAVENOUS; SUBCUTANEOUS at 06:12

## 2022-12-05 RX ADMIN — Medication 10 ML: at 06:12

## 2022-12-05 RX ADMIN — ENOXAPARIN SODIUM 40 MG: 40 INJECTION SUBCUTANEOUS at 06:12

## 2022-12-05 RX ADMIN — OXYCODONE HYDROCHLORIDE 5 MG: 5 SOLUTION ORAL at 10:12

## 2022-12-05 RX ADMIN — INSULIN ASPART 4 UNITS: 100 INJECTION, SOLUTION INTRAVENOUS; SUBCUTANEOUS at 12:12

## 2022-12-05 RX ADMIN — Medication 10 ML: at 11:12

## 2022-12-05 RX ADMIN — CEFEPIME 2 G: 2 INJECTION, POWDER, FOR SOLUTION INTRAVENOUS at 12:12

## 2022-12-05 RX ADMIN — CALCIUM CARBONATE (ANTACID) CHEW TAB 500 MG 1000 MG: 500 CHEW TAB at 05:12

## 2022-12-05 RX ADMIN — CALCIUM CARBONATE (ANTACID) CHEW TAB 500 MG 1000 MG: 500 CHEW TAB at 03:12

## 2022-12-05 RX ADMIN — CEFEPIME 2 G: 2 INJECTION, POWDER, FOR SOLUTION INTRAVENOUS at 09:12

## 2022-12-05 RX ADMIN — DEXTROSE AND SODIUM CHLORIDE: 5; .9 INJECTION, SOLUTION INTRAVENOUS at 09:12

## 2022-12-05 RX ADMIN — OXYCODONE HYDROCHLORIDE 5 MG: 5 SOLUTION ORAL at 05:12

## 2022-12-05 RX ADMIN — CALCIUM CARBONATE (ANTACID) CHEW TAB 500 MG 1000 MG: 500 CHEW TAB at 09:12

## 2022-12-05 RX ADMIN — CALCITRIOL 0.25 MCG: 1 SOLUTION ORAL at 08:12

## 2022-12-05 RX ADMIN — CALCIUM CARBONATE (ANTACID) CHEW TAB 500 MG 1000 MG: 500 CHEW TAB at 10:12

## 2022-12-05 RX ADMIN — CALCIUM CARBONATE (ANTACID) CHEW TAB 500 MG 1000 MG: 500 CHEW TAB at 06:12

## 2022-12-05 RX ADMIN — PANTOPRAZOLE SODIUM 40 MG: 40 INJECTION, POWDER, FOR SOLUTION INTRAVENOUS at 08:12

## 2022-12-05 RX ADMIN — CEFEPIME 2 G: 2 INJECTION, POWDER, FOR SOLUTION INTRAVENOUS at 11:12

## 2022-12-05 RX ADMIN — CEFEPIME 2 G: 2 INJECTION, POWDER, FOR SOLUTION INTRAVENOUS at 03:12

## 2022-12-05 NOTE — NURSING
Pt up sitting in recliner, walked twice down the hallway tolerated well, TF continue to be held, pt on D5NS at 100 ml/hr, laryngectomy suctioned twice and cleaned tolerated well as well. Call light in reach, will continue to monitor.

## 2022-12-05 NOTE — SUBJECTIVE & OBJECTIVE
Interval History: NAEON. Tolerating tube feeds     Medications:  Continuous Infusions:   lactated ringers Stopped (12/04/22 1528)     Scheduled Meds:   calcitrioL  0.25 mcg Per G Tube Daily    calcium carbonate  1,000 mg Per G Tube 5x Daily    ceFEPime (MAXIPIME) IVPB EXTENDED INFUSION  2 g Intravenous Q8H    enoxaparin  40 mg Subcutaneous Daily    micafungin (MYCAMINE) IVPB  100 mg Intravenous Q24H    pantoprazole  40 mg Intravenous Daily    polyethylene glycol  17 g Per G Tube Daily    sodium chloride 0.9%  10 mL Intravenous Q6H    vancomycin (VANCOCIN) IVPB  1,250 mg Intravenous Q24H     PRN Meds:acetaminophen, dextrose 10%, dextrose 10%, glucagon (human recombinant), hydrALAZINE, insulin aspart U-100, ondansetron, oxyCODONE, promethazine (PHENERGAN) IVPB, senna-docusate 8.6-50 mg, sodium chloride 0.9%, Flushing PICC Protocol **AND** sodium chloride 0.9% **AND** sodium chloride 0.9%     Review of patient's allergies indicates:   Allergen Reactions    Lovastatin Itching     Objective:     Vital Signs (24h Range):  Temp:  [96.3 °F (35.7 °C)-97.8 °F (36.6 °C)] 97.4 °F (36.3 °C)  Pulse:  [77-97] 81  Resp:  [18-20] 18  SpO2:  [93 %-100 %] 97 %  BP: ()/(56-73) 99/56       Lines/Drains/Airways       Peripherally Inserted Central Catheter Line  Duration             PICC Double Lumen 12/01/22 1017 right brachial 3 days              Drain  Duration                  Gastrostomy/Enterostomy LUQ -- days              Airway  Duration             Adult Surgical Airway Shiley Cuffed -- days         Laryngectomy (Do Not Remove) 11/09/22 Laryngectomy stoma 26 days              Peripheral Intravenous Line  Duration                  Peripheral IV - Single Lumen 11/28/22 1311 20 G;1 3/4 in Left;Anterior Forearm 6 days                    Physical Exam  NAD  Awake and alert  Mild facial edema   Trach within stoma, removed. Stoma appears healthy  Skin graft to midline neck overlying flap  Small area of dehiscence left laterally,  near area of STSG, dry  Left area with packing in place, removed and replaced. Copious drainage with appearance of tube feeds   Intraoral flap is soft without fluctuance, secretions thin, clear intraorally    Significant Labs:  CBC:   Recent Labs   Lab 12/03/22  0455   WBC 6.35   RBC 3.07*   HGB 9.2*   HCT 29.6*      MCV 96   MCH 30.0   MCHC 31.1*     CMP:   Recent Labs   Lab 12/03/22  0455   *   CALCIUM 7.2*   ALBUMIN 2.3*   PROT 5.8*   *   K 4.0   CO2 21*      BUN 14   CREATININE 0.7   ALKPHOS 1,065*   *   AST 67*   BILITOT 1.6*       Significant Diagnostics:  None

## 2022-12-05 NOTE — RESPIRATORY THERAPY
RAPID RESPONSE RESPIRATORY THERAPY PROACTIVE NOTE           Time of visit: 1108     Code Status: Full Code   : 1950  Bed: 1055/1055 A:   MRN: [id number]  Time spent at the bedside: < 15 min    SITUATION    Evaluated patient for: LDA Check     BACKGROUND    Patient has a past medical history of Hypothyroidism, PEG (percutaneous endoscopic gastrostomy) adjustment/replacement/removal, and Type 2 diabetes mellitus, without long-term current use of insulin.  Clinically Significant Surgical Hx: laryngectomy    24 Hours Vitals Range:  Temp:  [97.1 °F (36.2 °C)-97.9 °F (36.6 °C)]   Pulse:  [74-97]   Resp:  [16-20]   BP: ()/(56-67)   SpO2:  [93 %-100 %]     Labs:    Recent Labs     22  0455   *   K 4.0      CO2 21*   CREATININE 0.7   *        No results for input(s): PH, PCO2, PO2, HCO3, POCSATURATED, BE in the last 72 hours.    ASSESSMENT/INTERVENTIONS  Patient resting comfortably. No respiratory concerns at this time      Last VS   Temp: 97.9 °F (36.6 °C) (1104)  Pulse: 78 (1129)  Resp: 16 (1129)  BP: 112/65 (1104)  SpO2: 98 % (1129)      Extra trachs at bedside: NA  Level of Consciousness: Level of Consciousness (AVPU): alert  Respiratory Effort: Respiratory Effort: Normal, Unlabored Expansion/Accessory Muscle Usage: Expansion/Accessory Muscles/Retractions: expansion symmetric  All Lung Field Breath Sounds: All Lung Fields Breath Sounds: Anterior:, Lateral:, diminished, rhonchi  O2 Device/Concentration: 5L/21%  Surgical airway: Laryngectomy  Ambu at bedside: Ambu bag with the patient?: Yes, Adult Ambu     Active Orders   Respiratory Care    Oxygen Continuous     Frequency: Continuous     Number of Occurrences: Until Specified     Order Questions:      Device type: Low flow      Device: Trach Collar      FiO2%: 28      Titrate O2 per Oxygen Titration Protocol: Yes      To maintain SpO2 goal of: >= 90%      Notify MD of: Inability to achieve desired SpO2;  Sudden change in patient status and requires 20% increase in FiO2; Patient requires >60% FiO2    Routine tracheostomy care     Frequency: Q12H     Number of Occurrences: Until Specified    Tracheostomy replacement Once     Frequency: Once     Number of Occurrences: 1 Occurrences     Order Comments: Patient's laryngectomy stoma trach tube (6-0 Shiley cuffed) replace with new trach of same size 11/27  Please place additional 6-0 Shiley cuffed trach at bedside, MD to replace prn.         RECOMMENDATIONS    We recommend: RRT Recs: Continue POC per primary team.      FOLLOW-UP    Please call back the Rapid Response RT, Marcie Oleary RRT at x [phone] for any questions or concerns.

## 2022-12-05 NOTE — PLAN OF CARE
Problem: Adult Inpatient Plan of Care  Goal: Plan of Care Review  Outcome: Ongoing, Progressing     Problem: Diabetes Comorbidity  Goal: Blood Glucose Level Within Targeted Range  Outcome: Ongoing, Progressing     Problem: Infection  Goal: Absence of Infection Signs and Symptoms  Outcome: Ongoing, Progressing     Problem: Impaired Wound Healing  Goal: Optimal Wound Healing  Outcome: Ongoing, Progressing

## 2022-12-05 NOTE — PLAN OF CARE
Pt alert and oriented x 4, no distress, no discomfort. TF held at this time per MD orders. Pt has not had nausea or emesis this morning, pt up to walk. Call light in reach.

## 2022-12-05 NOTE — ASSESSMENT & PLAN NOTE
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    Admitted for bacteremia and fungemia, port removed and ID recommending IV antibiotics at home. PICC placed 12/1. 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     ENT/HNS:  - Laryngectomy protocol   -Sign above bed + outside door stating patient is "neck breather" and "can not be intubated by mouth"   -Clean laryngectomy stoma as needed, or daily  - Keep tracheostomy in place     - OK to replace tracheostomy if dislodged, only in place for swelling/secretion assistance -- replaced 11/27  - Humidified O2 via trach collar  - CT with fluid collection, will continue packing changes      Neuro:   - Pain: Multimodality PRN regimen initiated     Cardiac:  - HDS  - H/O of paroxysmal a fib, will CTM      Pulmonary:   - PRN breathing treatments  - Humidified air per trach collar  - OK to remove tracheostomy for adequate suctioning     Renal:   - monitor Is and Os  - Cr stable     Infectious Disease:  - Leukocytosis and BC positive for pseudomonas -- now improved WBC  - ID consulted; appreciate recommendations   - TTE unremarkable    - Continue BID packing changes   - Antibiotics per ID team; to end 12/11; PICC line placed   Antibiotics (From admission, onward)    Start     Stop Route Frequency Ordered    11/30/22 1500  vancomycin 1.25 g in dextrose 5% 250 mL IVPB (ready to mix)         -- IV Every 24 hours (non-standard times) 11/30/22 1220    11/29/22 2200  cefepime in dextrose 5 % IVPB 2 g         -- IV Every 8 hours (non-standard times) 11/29/22 1545    11/24/22 0900  mupirocin 2 % ointment         11/29 0859 Nasl 2 times daily 11/24/22 0319           Hematology/Oncology:  - H/H stable  - Lovenox DVT prophylaxis      FEN/GI  - Transaminitis and increased alk phos   - U/S of abdomen + liver dopppler     -- biliary " sludge, otherwise unremarkable; doppler wnl   - Medicine consulted; appreciate recs     - CRP, ESR elevated     - Autoimmune workup negative      - Hepatitis screen negative   - Will hold TFs considering fistula   - Postoperative hypoparathyroidism   - Hypocalcemia on initial admission     - Continue tums QID, calcitriol   - Replace electrolytes as needed to keep K>4, Mg>2 --repleted K+ 11/27  - Bowel reg  - Protonix for GI prophylaxis, GERD  - STRICT nausea control ondansetron, phenergan  - KUB to rule out ileus given emesis       MSK  - Out of bed as tolerated  - STSG donor site healed     Dispo:  - Continue hospital stay, pending arrangement of  for tube feeds, trach supplies, and antibiotics

## 2022-12-05 NOTE — PROGRESS NOTES
[facility]ael [facility] - [facility]  Otorhinolaryngology-Head & Neck Surgery  Progress Note    Subjective:     Post-Op Info:  * No surgery found *      Hospital Day: 13     Interval History: NAEON. Tolerating tube feeds     Medications:  Continuous Infusions:   lactated ringers Stopped (12/04/22 1528)     Scheduled Meds:   calcitrioL  0.25 mcg Per G Tube Daily    calcium carbonate  1,000 mg Per G Tube 5x Daily    ceFEPime (MAXIPIME) IVPB EXTENDED INFUSION  2 g Intravenous Q8H    enoxaparin  40 mg Subcutaneous Daily    micafungin (MYCAMINE) IVPB  100 mg Intravenous Q24H    pantoprazole  40 mg Intravenous Daily    polyethylene glycol  17 g Per G Tube Daily    sodium chloride 0.9%  10 mL Intravenous Q6H    vancomycin (VANCOCIN) IVPB  1,250 mg Intravenous Q24H     PRN Meds:acetaminophen, dextrose 10%, dextrose 10%, glucagon (human recombinant), hydrALAZINE, insulin aspart U-100, ondansetron, oxyCODONE, promethazine (PHENERGAN) IVPB, senna-docusate 8.6-50 mg, sodium chloride 0.9%, Flushing PICC Protocol **AND** sodium chloride 0.9% **AND** sodium chloride 0.9%     Review of patient's allergies indicates:   Allergen Reactions    Lovastatin Itching     Objective:     Vital Signs (24h Range):  Temp:  [96.3 °F (35.7 °C)-97.8 °F (36.6 °C)] 97.4 °F (36.3 °C)  Pulse:  [77-97] 81  Resp:  [18-20] 18  SpO2:  [93 %-100 %] 97 %  BP: ()/(56-73) 99/56       Lines/Drains/Airways       Peripherally Inserted Central Catheter Line  Duration             PICC Double Lumen 12/01/22 1017 right brachial 3 days              Drain  Duration                  Gastrostomy/Enterostomy LUQ -- days              Airway  Duration             Adult Surgical Airway Shiley Cuffed -- days         Laryngectomy (Do Not Remove) 11/09/22 Laryngectomy stoma 26 days              Peripheral Intravenous Line  Duration                  Peripheral IV - Single Lumen 11/28/22 1311 20 G;1 3/4 in Left;Anterior Forearm 6 days                    Physical Exam  NAD  Awake and  "alert  Mild facial edema   Trach within stoma, removed. Stoma appears healthy  Skin graft to midline neck overlying flap  Small area of dehiscence left laterally, near area of STSG, dry  Left area with packing in place, removed and replaced. 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    Admitted for bacteremia and fungemia, port removed and ID recommending IV antibiotics at home. PICC placed 12/1. 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     ENT/HNS:  - Laryngectomy protocol   -Sign above bed + outside door stating patient is "neck breather" and "can not be intubated by mouth"   -Clean laryngectomy stoma as needed, or daily  - Keep tracheostomy in place     - OK to replace tracheostomy if dislodged, only in place for swelling/secretion assistance -- replaced 11/27  - Humidified O2 via trach collar  - CT with fluid collection, will continue packing changes      Neuro:   - Pain: Multimodality PRN regimen initiated     Cardiac:  - HDS  - H/O of paroxysmal a fib, will CTM      Pulmonary:   - PRN breathing treatments  - Humidified air per trach collar  - OK to remove tracheostomy for adequate suctioning     Renal:   - monitor Is and Os  - Cr stable     Infectious Disease:  - Leukocytosis and BC " positive for pseudomonas -- now improved WBC  - ID consulted; appreciate recommendations   - TTE unremarkable    - Continue BID packing changes   - Antibiotics per ID team; to end 12/11; PICC line placed   Antibiotics (From admission, onward)    Start     Stop Route Frequency Ordered    11/30/22 1500  vancomycin 1.25 g in dextrose 5% 250 mL IVPB (ready to mix)         -- IV Every 24 hours (non-standard times) 11/30/22 1220    11/29/22 2200  cefepime in dextrose 5 % IVPB 2 g         -- IV Every 8 hours (non-standard times) 11/29/22 1545    11/24/22 0900  mupirocin 2 % ointment         11/29 0859 Nasl 2 times daily 11/24/22 0319           Hematology/Oncology:  - H/H stable  - Lovenox DVT prophylaxis      FEN/GI  - Transaminitis and increased alk phos   - U/S of abdomen + liver dopppler     -- biliary sludge, otherwise unremarkable; doppler wnl   - Medicine consulted; appreciate recs     - CRP, ESR elevated     - Autoimmune workup negative      - Hepatitis screen negative   - Will hold TFs considering fistula   - Postoperative hypoparathyroidism   - Hypocalcemia on initial admission     - Continue tums QID, calcitriol   - Replace electrolytes as needed to keep K>4, Mg>2 --repleted K+ 11/27  - Bowel reg  - Protonix for GI prophylaxis, GERD  - STRICT nausea control ondansetron, phenergan  - KUB to rule out ileus given emesis       MSK  - Out of bed as tolerated  - STSG donor site healed     Dispo:  - Continue hospital stay, pending arrangement of  for tube feeds, trach supplies, and antibiotics          Jo Ann Bryant MD  Otorhinolaryngology-Head & Neck Surgery  [facility]

## 2022-12-06 ENCOUNTER — PATIENT MESSAGE (OUTPATIENT)
Dept: HEMATOLOGY/ONCOLOGY | Facility: CLINIC | Age: 72
End: 2022-12-06

## 2022-12-06 LAB
ALBUMIN SERPL BCP-MCNC: 2.3 G/DL (ref 3.5–5.2)
ALP SERPL-CCNC: 807 U/L (ref 55–135)
ALT SERPL W/O P-5'-P-CCNC: 71 U/L (ref 10–44)
ANION GAP SERPL CALC-SCNC: 7 MMOL/L (ref 8–16)
AST SERPL-CCNC: 36 U/L (ref 10–40)
BASOPHILS # BLD AUTO: 0.02 K/UL (ref 0–0.2)
BASOPHILS NFR BLD: 0.4 % (ref 0–1.9)
BILIRUB SERPL-MCNC: 1.7 MG/DL (ref 0.1–1)
BUN SERPL-MCNC: 14 MG/DL (ref 8–23)
CALCIUM SERPL-MCNC: 7.3 MG/DL (ref 8.7–10.5)
CHLORIDE SERPL-SCNC: 104 MMOL/L (ref 95–110)
CO2 SERPL-SCNC: 23 MMOL/L (ref 23–29)
CREAT SERPL-MCNC: 0.7 MG/DL (ref 0.5–1.4)
DIFFERENTIAL METHOD: ABNORMAL
EOSINOPHIL # BLD AUTO: 0.2 K/UL (ref 0–0.5)
EOSINOPHIL NFR BLD: 3.8 % (ref 0–8)
ERYTHROCYTE [DISTWIDTH] IN BLOOD BY AUTOMATED COUNT: 15.5 % (ref 11.5–14.5)
EST. GFR  (NO RACE VARIABLE): >60 ML/MIN/1.73 M^2
GLUCOSE SERPL-MCNC: 152 MG/DL (ref 70–110)
HCT VFR BLD AUTO: 27.8 % (ref 40–54)
HGB BLD-MCNC: 8.7 G/DL (ref 14–18)
IMM GRANULOCYTES # BLD AUTO: 0.11 K/UL (ref 0–0.04)
IMM GRANULOCYTES NFR BLD AUTO: 2.2 % (ref 0–0.5)
LYMPHOCYTES # BLD AUTO: 0.3 K/UL (ref 1–4.8)
LYMPHOCYTES NFR BLD: 5.5 % (ref 18–48)
MCH RBC QN AUTO: 29.7 PG (ref 27–31)
MCHC RBC AUTO-ENTMCNC: 31.3 G/DL (ref 32–36)
MCV RBC AUTO: 95 FL (ref 82–98)
MONOCYTES # BLD AUTO: 0.4 K/UL (ref 0.3–1)
MONOCYTES NFR BLD: 7.1 % (ref 4–15)
NEUTROPHILS # BLD AUTO: 4.1 K/UL (ref 1.8–7.7)
NEUTROPHILS NFR BLD: 81 % (ref 38–73)
NRBC BLD-RTO: 0 /100 WBC
PLATELET # BLD AUTO: 239 K/UL (ref 150–450)
PMV BLD AUTO: 10.4 FL (ref 9.2–12.9)
POCT GLUCOSE: 164 MG/DL (ref 70–110)
POCT GLUCOSE: 169 MG/DL (ref 70–110)
POCT GLUCOSE: 224 MG/DL (ref 70–110)
POCT GLUCOSE: 240 MG/DL (ref 70–110)
POCT GLUCOSE: 252 MG/DL (ref 70–110)
POTASSIUM SERPL-SCNC: 3.9 MMOL/L (ref 3.5–5.1)
PROT SERPL-MCNC: 5.8 G/DL (ref 6–8.4)
RBC # BLD AUTO: 2.93 M/UL (ref 4.6–6.2)
SODIUM SERPL-SCNC: 134 MMOL/L (ref 136–145)
VANCOMYCIN TROUGH SERPL-MCNC: 12.9 UG/ML (ref 10–22)
WBC # BLD AUTO: 5.05 K/UL (ref 3.9–12.7)

## 2022-12-06 PROCEDURE — A4216 STERILE WATER/SALINE, 10 ML: HCPCS | Performed by: OTOLARYNGOLOGY

## 2022-12-06 PROCEDURE — 80202 ASSAY OF VANCOMYCIN: CPT | Performed by: OTOLARYNGOLOGY

## 2022-12-06 PROCEDURE — C9113 INJ PANTOPRAZOLE SODIUM, VIA: HCPCS | Performed by: STUDENT IN AN ORGANIZED HEALTH CARE EDUCATION/TRAINING PROGRAM

## 2022-12-06 PROCEDURE — 27000221 HC OXYGEN, UP TO 24 HOURS

## 2022-12-06 PROCEDURE — 63600175 PHARM REV CODE 636 W HCPCS

## 2022-12-06 PROCEDURE — 94761 N-INVAS EAR/PLS OXIMETRY MLT: CPT

## 2022-12-06 PROCEDURE — 99900035 HC TECH TIME PER 15 MIN (STAT)

## 2022-12-06 PROCEDURE — 27200966 HC CLOSED SUCTION SYSTEM

## 2022-12-06 PROCEDURE — 25000003 PHARM REV CODE 250

## 2022-12-06 PROCEDURE — 20600001 HC STEP DOWN PRIVATE ROOM

## 2022-12-06 PROCEDURE — 85025 COMPLETE CBC W/AUTO DIFF WBC: CPT | Performed by: STUDENT IN AN ORGANIZED HEALTH CARE EDUCATION/TRAINING PROGRAM

## 2022-12-06 PROCEDURE — 63600175 PHARM REV CODE 636 W HCPCS: Performed by: STUDENT IN AN ORGANIZED HEALTH CARE EDUCATION/TRAINING PROGRAM

## 2022-12-06 PROCEDURE — 99900026 HC AIRWAY MAINTENANCE (STAT)

## 2022-12-06 PROCEDURE — 63600175 PHARM REV CODE 636 W HCPCS: Mod: JG | Performed by: STUDENT IN AN ORGANIZED HEALTH CARE EDUCATION/TRAINING PROGRAM

## 2022-12-06 PROCEDURE — 63600175 PHARM REV CODE 636 W HCPCS: Performed by: OTOLARYNGOLOGY

## 2022-12-06 PROCEDURE — 80053 COMPREHEN METABOLIC PANEL: CPT | Performed by: STUDENT IN AN ORGANIZED HEALTH CARE EDUCATION/TRAINING PROGRAM

## 2022-12-06 PROCEDURE — 63700000 PHARM REV CODE 250 ALT 637 W/O HCPCS: Performed by: STUDENT IN AN ORGANIZED HEALTH CARE EDUCATION/TRAINING PROGRAM

## 2022-12-06 PROCEDURE — 25000003 PHARM REV CODE 250: Performed by: OTOLARYNGOLOGY

## 2022-12-06 PROCEDURE — 25000003 PHARM REV CODE 250: Performed by: STUDENT IN AN ORGANIZED HEALTH CARE EDUCATION/TRAINING PROGRAM

## 2022-12-06 RX ADMIN — Medication 10 ML: at 06:12

## 2022-12-06 RX ADMIN — CALCIUM CARBONATE (ANTACID) CHEW TAB 500 MG 1000 MG: 500 CHEW TAB at 06:12

## 2022-12-06 RX ADMIN — ENOXAPARIN SODIUM 40 MG: 40 INJECTION SUBCUTANEOUS at 04:12

## 2022-12-06 RX ADMIN — INSULIN ASPART 4 UNITS: 100 INJECTION, SOLUTION INTRAVENOUS; SUBCUTANEOUS at 01:12

## 2022-12-06 RX ADMIN — INSULIN ASPART 1 UNITS: 100 INJECTION, SOLUTION INTRAVENOUS; SUBCUTANEOUS at 12:12

## 2022-12-06 RX ADMIN — VANCOMYCIN HYDROCHLORIDE 1250 MG: 1.25 INJECTION, POWDER, LYOPHILIZED, FOR SOLUTION INTRAVENOUS at 04:12

## 2022-12-06 RX ADMIN — Medication 10 ML: at 12:12

## 2022-12-06 RX ADMIN — MICAFUNGIN SODIUM 100 MG: 100 INJECTION, POWDER, LYOPHILIZED, FOR SOLUTION INTRAVENOUS at 10:12

## 2022-12-06 RX ADMIN — CALCIUM CARBONATE (ANTACID) CHEW TAB 500 MG 1000 MG: 500 CHEW TAB at 10:12

## 2022-12-06 RX ADMIN — CEFEPIME 2 G: 2 INJECTION, POWDER, FOR SOLUTION INTRAVENOUS at 04:12

## 2022-12-06 RX ADMIN — PANTOPRAZOLE SODIUM 40 MG: 40 INJECTION, POWDER, FOR SOLUTION INTRAVENOUS at 09:12

## 2022-12-06 RX ADMIN — INSULIN ASPART 2 UNITS: 100 INJECTION, SOLUTION INTRAVENOUS; SUBCUTANEOUS at 06:12

## 2022-12-06 RX ADMIN — CALCITRIOL 0.25 MCG: 1 SOLUTION ORAL at 09:12

## 2022-12-06 RX ADMIN — CEFEPIME 2 G: 2 INJECTION, POWDER, FOR SOLUTION INTRAVENOUS at 09:12

## 2022-12-06 RX ADMIN — CALCIUM CARBONATE (ANTACID) CHEW TAB 500 MG 1000 MG: 500 CHEW TAB at 02:12

## 2022-12-06 RX ADMIN — INSULIN ASPART 6 UNITS: 100 INJECTION, SOLUTION INTRAVENOUS; SUBCUTANEOUS at 06:12

## 2022-12-06 RX ADMIN — CALCIUM CARBONATE (ANTACID) CHEW TAB 500 MG 1000 MG: 500 CHEW TAB at 09:12

## 2022-12-06 RX ADMIN — POLYETHYLENE GLYCOL 3350 17 G: 17 POWDER, FOR SOLUTION ORAL at 09:12

## 2022-12-06 NOTE — PLAN OF CARE
Pt has remained pain free. No pain meds given. Pt has also remained nausea free. No nausea meds given last night. No changes in patient condition. Will continue to monitor pt for changes.

## 2022-12-06 NOTE — PROGRESS NOTES
Pharmacokinetic Assessment Follow Up: IV Vancomycin    Vancomycin serum concentration assessment(s):    The trough level was drawn correctly and can be used to guide therapy at this time. The measurement is below the desired definitive target range of 15 to 20 mcg/mL.    Vancomycin Regimen Plan:    Change regimen to Vancomycin 1500 mg IV every 24 hours with next serum trough concentration measured at 1500 prior to 3rd dose on 12/09/22    Drug levels (last 3 results):  Recent Labs   Lab Result Units 12/06/22  1557   Vancomycin-Trough ug/mL 12.9       Pharmacy will continue to follow and monitor vancomycin.    Please contact pharmacy at extension [phone] for questions regarding this assessment.    Thank you for the consult,   Daiana Grant       Patient brief summary:  Cyrus Batres Jr. is a 72 y.o. male initiated on antimicrobial therapy with IV Vancomycin for treatment of bacteremia    The patient's current regimen is vancomycin 1250 mg IV q24h    Drug Allergies:   Review of patient's allergies indicates:   Allergen Reactions    Lovastatin Itching       Actual Body Weight:   62.1 kg    Renal Function:   Estimated Creatinine Clearance: 85 mL/min (based on SCr of 0.7 mg/dL).,     Dialysis Method (if applicable):  N/A    CBC (last 72 hours):  Recent Labs   Lab Result Units 12/06/22  0423   WBC K/uL 5.05  5.05  5.05   Hemoglobin g/dL 8.7*  8.7*  8.7*   Hematocrit % 27.8*  27.8*  27.8*   Platelets K/uL 239  239  239   Gran % % 81.0*  81.0*  81.0*   Lymph % % 5.5*  5.5*  5.5*   Mono % % 7.1  7.1  7.1   Eosinophil % % 3.8  3.8  3.8   Basophil % % 0.4  0.4  0.4   Differential Method  Automated  Automated  Automated       Metabolic Panel (last 72 hours):  Recent Labs   Lab Result Units 12/06/22  0423   Sodium mmol/L 134*  134*  134*   Potassium mmol/L 3.9  3.9  3.9   Chloride mmol/L 104  104  104   CO2 mmol/L 23  23  23   Glucose mg/dL 152*  152*  152*   BUN mg/dL 14  14  14   Creatinine  mg/dL 0.7  0.7  0.7   Albumin g/dL 2.3*  2.3*  2.3*   Total Bilirubin mg/dL 1.7*  1.7*  1.7*   Alkaline Phosphatase U/L 807*  807*  807*   AST U/L 36  36  36   ALT U/L 71*  71*  71*       Vancomycin Administrations:  vancomycin given in the last 96 hours                     vancomycin 1,250 mg in dextrose 5 % 250 mL IVPB (mg) 1,250 mg New Bag 12/06/22 1623    vancomycin 1.25 g in dextrose 5% 250 mL IVPB (ready to mix) (mg) 1,250 mg New Bag 12/05/22 1550     1,250 mg New Bag 12/04/22 1525     1,250 mg New Bag 12/03/22 1528                    Microbiologic Results:  Microbiology Results (last 7 days)       Procedure Component Value Units Date/Time    Blood culture [[id number]] Collected: 11/26/22 0831    Order Status: Completed Specimen: Blood from Peripheral, Right Hand Updated: 12/01/22 1212     Blood Culture, Routine No growth after 5 days.    Blood culture [[id number]] Collected: 11/26/22 0832    Order Status: Completed Specimen: Blood from Peripheral, Left Hand Updated: 12/01/22 1212     Blood Culture, Routine No growth after 5 days.

## 2022-12-06 NOTE — PROGRESS NOTES
Nael Carey - [facility]  Otorhinolaryngology-Head & Neck Surgery  Progress Note    Subjective:     Post-Op Info:  * No surgery found *      Hospital Day: 14     Interval History: No issues overnight. No further emesis with tube feeds held.    Medications:  Continuous Infusions:   dextrose 5 % and 0.9 % NaCl 100 mL/hr at 12/05/22 0912     Scheduled Meds:   calcitrioL  0.25 mcg Per G Tube Daily    calcium carbonate  1,000 mg Per G Tube 5x Daily    ceFEPime (MAXIPIME) IVPB EXTENDED INFUSION  2 g Intravenous Q8H    enoxaparin  40 mg Subcutaneous Daily    micafungin (MYCAMINE) IVPB  100 mg Intravenous Q24H    pantoprazole  40 mg Intravenous Daily    polyethylene glycol  17 g Per G Tube Daily    sodium chloride 0.9%  10 mL Intravenous Q6H    vancomycin (VANCOCIN) IVPB  1,250 mg Intravenous Q24H     PRN Meds:acetaminophen, dextrose 10%, dextrose 10%, glucagon (human recombinant), hydrALAZINE, insulin aspart U-100, ondansetron, oxyCODONE, promethazine (PHENERGAN) IVPB, senna-docusate 8.6-50 mg, sodium chloride 0.9%, Flushing PICC Protocol **AND** sodium chloride 0.9% **AND** sodium chloride 0.9%     Review of patient's allergies indicates:   Allergen Reactions    Lovastatin Itching     Objective:     Vital Signs (24h Range):  Temp:  [95.9 °F (35.5 °C)-98.9 °F (37.2 °C)] 97.5 °F (36.4 °C)  Pulse:  [70-78] 71  Resp:  [16-18] 18  SpO2:  [96 %-100 %] 100 %  BP: (106-133)/(56-69) 133/69       Lines/Drains/Airways       Peripherally Inserted Central Catheter Line  Duration             PICC Double Lumen 12/01/22 1017 right brachial 4 days              Drain  Duration                  Gastrostomy/Enterostomy LUQ -- days              Airway  Duration             Adult Surgical Airway Shiley Cuffed -- days         Laryngectomy (Do Not Remove) 11/09/22 Laryngectomy stoma 27 days              Peripheral Intravenous Line  Duration                  Peripheral IV - Single Lumen 11/28/22 1311 20 G;1 3/4 in Left;Anterior Forearm 7 days  "                   Physical Exam  NAD  Awake and alert  Mild facial edema   Trach within stoma, removed. Stoma appears healthy  Skin graft to midline neck overlying flap  Small area of dehiscence left laterally, near area of STSG, dry  Left area with packing in place, removed and replaced.  Minimal secretions from wound.  Intraoral flap is soft without fluctuance, secretions thin, clear intraorally    Significant Labs:  CBC:   Recent Labs   Lab 12/06/22  0423   WBC 5.05  5.05  5.05   RBC 2.93*  2.93*  2.93*   HGB 8.7*  8.7*  8.7*   HCT 27.8*  27.8*  27.8*     239  239   MCV 95  95  95   MCH 29.7  29.7  29.7   MCHC 31.3*  31.3*  31.3*     CMP:   Recent Labs   Lab 12/06/22  0423   *  152*  152*   CALCIUM 7.3*  7.3*  7.3*   ALBUMIN 2.3*  2.3*  2.3*   PROT 5.8*  5.8*  5.8*   *  134*  134*   K 3.9  3.9  3.9   CO2 23  23  23     104  104   BUN 14  14  14   CREATININE 0.7  0.7  0.7   ALKPHOS 807*  807*  807*   ALT 71*  71*  71*   AST 36  36  36   BILITOT 1.7*  1.7*  1.7*       Significant Diagnostics:  I have reviewed and interpreted all pertinent imaging results/findings within the past 24 hours.    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    Admitted for bacteremia and fungemia, port removed and ID recommending IV antibiotics at home. PICC placed 12/1. 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     ENT/HNS:  - Laryngectomy protocol   -Sign above bed + outside door stating patient is "neck breather" and "can not be intubated by mouth"   -Clean laryngectomy stoma as needed, or daily  - Keep tracheostomy in place     - OK to replace tracheostomy if dislodged, only in place for swelling/secretion assistance -- replaced 11/27  - Humidified O2 via " trach collar  - CT with fluid collection, will continue packing changes      Neuro:   - Pain: Multimodality PRN regimen initiated     Cardiac:  - HDS  - H/O of paroxysmal a fib, will CTM      Pulmonary:   - PRN breathing treatments  - Humidified air per trach collar  - OK to remove tracheostomy for adequate suctioning     Renal:   - monitor Is and Os  - Cr stable     Infectious Disease:  - Leukocytosis and BC positive for pseudomonas -- now improved WBC  - ID consulted; appreciate recommendations   - TTE unremarkable    - Continue BID packing changes   - Antibiotics per ID team; to end 12/11; PICC line placed   Antibiotics (From admission, onward)    Start     Stop Route Frequency Ordered    11/30/22 1500  vancomycin 1.25 g in dextrose 5% 250 mL IVPB (ready to mix)         -- IV Every 24 hours (non-standard times) 11/30/22 1220    11/29/22 2200  cefepime in dextrose 5 % IVPB 2 g         -- IV Every 8 hours (non-standard times) 11/29/22 1545    11/24/22 0900  mupirocin 2 % ointment         11/29 0859 Nasl 2 times daily 11/24/22 0319           Hematology/Oncology:  - H/H stable  - Lovenox DVT prophylaxis      FEN/GI  - Transaminitis and increased alk phos   - U/S of abdomen + liver dopppler     -- biliary sludge, otherwise unremarkable; doppler wnl   - Medicine consulted; appreciate recs     - CRP, ESR elevated     - Autoimmune workup negative      - Hepatitis screen negative   - Will hold TFs considering fistula   - Postoperative hypoparathyroidism   - Hypocalcemia on initial admission     - Continue tums QID, calcitriol   - Replace electrolytes as needed to keep K>4, Mg>2 --repleted K+ 11/27  - Bowel reg  - Protonix for GI prophylaxis, GERD  - STRICT nausea control ondansetron, phenergan  - KUB to rule out ileus given emesis       MSK  - Out of bed as tolerated  - STSG donor site healed     Dispo:  - Continue hospital stay, pending arrangement of HH for tube feeds, trach supplies, and antibiotics          Henna  Sofia Mckoy MD  Otorhinolaryngology-Head & Neck Surgery  [facility]

## 2022-12-06 NOTE — SUBJECTIVE & OBJECTIVE
Interval History: No issues overnight. No further emesis with tube feeds held.    Medications:  Continuous Infusions:   dextrose 5 % and 0.9 % NaCl 100 mL/hr at 12/05/22 0912     Scheduled Meds:   calcitrioL  0.25 mcg Per G Tube Daily    calcium carbonate  1,000 mg Per G Tube 5x Daily    ceFEPime (MAXIPIME) IVPB EXTENDED INFUSION  2 g Intravenous Q8H    enoxaparin  40 mg Subcutaneous Daily    micafungin (MYCAMINE) IVPB  100 mg Intravenous Q24H    pantoprazole  40 mg Intravenous Daily    polyethylene glycol  17 g Per G Tube Daily    sodium chloride 0.9%  10 mL Intravenous Q6H    vancomycin (VANCOCIN) IVPB  1,250 mg Intravenous Q24H     PRN Meds:acetaminophen, dextrose 10%, dextrose 10%, glucagon (human recombinant), hydrALAZINE, insulin aspart U-100, ondansetron, oxyCODONE, promethazine (PHENERGAN) IVPB, senna-docusate 8.6-50 mg, sodium chloride 0.9%, Flushing PICC Protocol **AND** sodium chloride 0.9% **AND** sodium chloride 0.9%     Review of patient's allergies indicates:   Allergen Reactions    Lovastatin Itching     Objective:     Vital Signs (24h Range):  Temp:  [95.9 °F (35.5 °C)-98.9 °F (37.2 °C)] 97.5 °F (36.4 °C)  Pulse:  [70-78] 71  Resp:  [16-18] 18  SpO2:  [96 %-100 %] 100 %  BP: (106-133)/(56-69) 133/69       Lines/Drains/Airways       Peripherally Inserted Central Catheter Line  Duration             PICC Double Lumen 12/01/22 1017 right brachial 4 days              Drain  Duration                  Gastrostomy/Enterostomy LUQ -- days              Airway  Duration             Adult Surgical Airway Shiley Cuffed -- days         Laryngectomy (Do Not Remove) 11/09/22 Laryngectomy stoma 27 days              Peripheral Intravenous Line  Duration                  Peripheral IV - Single Lumen 11/28/22 1311 20 G;1 3/4 in Left;Anterior Forearm 7 days                    Physical Exam  NAD  Awake and alert  Mild facial edema   Trach within stoma, removed. Stoma appears healthy  Skin graft to midline neck overlying  flap  Small area of dehiscence left laterally, near area of STSG, dry  Left area with packing in place, removed and replaced.  Minimal secretions from wound.  Intraoral flap is soft without fluctuance, secretions thin, clear intraorally    Significant Labs:  CBC:   Recent Labs   Lab 12/06/22  0423   WBC 5.05  5.05  5.05   RBC 2.93*  2.93*  2.93*   HGB 8.7*  8.7*  8.7*   HCT 27.8*  27.8*  27.8*     239  239   MCV 95  95  95   MCH 29.7  29.7  29.7   MCHC 31.3*  31.3*  31.3*     CMP:   Recent Labs   Lab 12/06/22 0423   *  152*  152*   CALCIUM 7.3*  7.3*  7.3*   ALBUMIN 2.3*  2.3*  2.3*   PROT 5.8*  5.8*  5.8*   *  134*  134*   K 3.9  3.9  3.9   CO2 23  23  23     104  104   BUN 14  14  14   CREATININE 0.7  0.7  0.7   ALKPHOS 807*  807*  807*   ALT 71*  71*  71*   AST 36  36  36   BILITOT 1.7*  1.7*  1.7*       Significant Diagnostics:  I have reviewed and interpreted all pertinent imaging results/findings within the past 24 hours.

## 2022-12-06 NOTE — PLAN OF CARE
Pt alert and oriented x 4 no distress, no complaints of pain, pt not on TF, on IV abx through IVELISSE PICC, pt ambulatory, call light in reach, will continue to monitor.

## 2022-12-07 LAB
ALBUMIN SERPL BCP-MCNC: 2.3 G/DL (ref 3.5–5.2)
ALP SERPL-CCNC: 867 U/L (ref 55–135)
ALT SERPL W/O P-5'-P-CCNC: 74 U/L (ref 10–44)
ANION GAP SERPL CALC-SCNC: 7 MMOL/L (ref 8–16)
AST SERPL-CCNC: 44 U/L (ref 10–40)
BASOPHILS # BLD AUTO: 0.02 K/UL (ref 0–0.2)
BASOPHILS NFR BLD: 0.5 % (ref 0–1.9)
BILIRUB SERPL-MCNC: 1.5 MG/DL (ref 0.1–1)
BUN SERPL-MCNC: 10 MG/DL (ref 8–23)
CALCIUM SERPL-MCNC: 6.9 MG/DL (ref 8.7–10.5)
CHLORIDE SERPL-SCNC: 108 MMOL/L (ref 95–110)
CO2 SERPL-SCNC: 21 MMOL/L (ref 23–29)
CREAT SERPL-MCNC: 0.7 MG/DL (ref 0.5–1.4)
DIFFERENTIAL METHOD: ABNORMAL
EOSINOPHIL # BLD AUTO: 0.1 K/UL (ref 0–0.5)
EOSINOPHIL NFR BLD: 2.9 % (ref 0–8)
ERYTHROCYTE [DISTWIDTH] IN BLOOD BY AUTOMATED COUNT: 15.5 % (ref 11.5–14.5)
EST. GFR  (NO RACE VARIABLE): >60 ML/MIN/1.73 M^2
GLUCOSE SERPL-MCNC: 200 MG/DL (ref 70–110)
HCT VFR BLD AUTO: 27.9 % (ref 40–54)
HGB BLD-MCNC: 8.9 G/DL (ref 14–18)
IMM GRANULOCYTES # BLD AUTO: 0.08 K/UL (ref 0–0.04)
IMM GRANULOCYTES NFR BLD AUTO: 1.9 % (ref 0–0.5)
LYMPHOCYTES # BLD AUTO: 0.3 K/UL (ref 1–4.8)
LYMPHOCYTES NFR BLD: 7.5 % (ref 18–48)
MCH RBC QN AUTO: 30.1 PG (ref 27–31)
MCHC RBC AUTO-ENTMCNC: 31.9 G/DL (ref 32–36)
MCV RBC AUTO: 94 FL (ref 82–98)
MONOCYTES # BLD AUTO: 0.3 K/UL (ref 0.3–1)
MONOCYTES NFR BLD: 6.3 % (ref 4–15)
NEUTROPHILS # BLD AUTO: 3.4 K/UL (ref 1.8–7.7)
NEUTROPHILS NFR BLD: 80.9 % (ref 38–73)
NRBC BLD-RTO: 0 /100 WBC
PLATELET # BLD AUTO: 225 K/UL (ref 150–450)
PMV BLD AUTO: 10.5 FL (ref 9.2–12.9)
POCT GLUCOSE: 128 MG/DL (ref 70–110)
POCT GLUCOSE: 154 MG/DL (ref 70–110)
POCT GLUCOSE: 167 MG/DL (ref 70–110)
POCT GLUCOSE: 235 MG/DL (ref 70–110)
POCT GLUCOSE: 248 MG/DL (ref 70–110)
POCT GLUCOSE: 251 MG/DL (ref 70–110)
POTASSIUM SERPL-SCNC: 3.4 MMOL/L (ref 3.5–5.1)
PROT SERPL-MCNC: 5.7 G/DL (ref 6–8.4)
RBC # BLD AUTO: 2.96 M/UL (ref 4.6–6.2)
SODIUM SERPL-SCNC: 136 MMOL/L (ref 136–145)
WBC # BLD AUTO: 4.15 K/UL (ref 3.9–12.7)

## 2022-12-07 PROCEDURE — 63600175 PHARM REV CODE 636 W HCPCS

## 2022-12-07 PROCEDURE — 25000003 PHARM REV CODE 250: Performed by: OTOLARYNGOLOGY

## 2022-12-07 PROCEDURE — 94761 N-INVAS EAR/PLS OXIMETRY MLT: CPT

## 2022-12-07 PROCEDURE — 25000003 PHARM REV CODE 250: Performed by: STUDENT IN AN ORGANIZED HEALTH CARE EDUCATION/TRAINING PROGRAM

## 2022-12-07 PROCEDURE — A4216 STERILE WATER/SALINE, 10 ML: HCPCS | Performed by: OTOLARYNGOLOGY

## 2022-12-07 PROCEDURE — 99900035 HC TECH TIME PER 15 MIN (STAT)

## 2022-12-07 PROCEDURE — 99900026 HC AIRWAY MAINTENANCE (STAT)

## 2022-12-07 PROCEDURE — 63700000 PHARM REV CODE 250 ALT 637 W/O HCPCS: Performed by: STUDENT IN AN ORGANIZED HEALTH CARE EDUCATION/TRAINING PROGRAM

## 2022-12-07 PROCEDURE — C9113 INJ PANTOPRAZOLE SODIUM, VIA: HCPCS | Performed by: STUDENT IN AN ORGANIZED HEALTH CARE EDUCATION/TRAINING PROGRAM

## 2022-12-07 PROCEDURE — 63600175 PHARM REV CODE 636 W HCPCS: Performed by: STUDENT IN AN ORGANIZED HEALTH CARE EDUCATION/TRAINING PROGRAM

## 2022-12-07 PROCEDURE — 63600175 PHARM REV CODE 636 W HCPCS: Performed by: OTOLARYNGOLOGY

## 2022-12-07 PROCEDURE — 80053 COMPREHEN METABOLIC PANEL: CPT | Performed by: STUDENT IN AN ORGANIZED HEALTH CARE EDUCATION/TRAINING PROGRAM

## 2022-12-07 PROCEDURE — 63600175 PHARM REV CODE 636 W HCPCS: Mod: JG | Performed by: STUDENT IN AN ORGANIZED HEALTH CARE EDUCATION/TRAINING PROGRAM

## 2022-12-07 PROCEDURE — 25000003 PHARM REV CODE 250

## 2022-12-07 PROCEDURE — 27000221 HC OXYGEN, UP TO 24 HOURS

## 2022-12-07 PROCEDURE — 85025 COMPLETE CBC W/AUTO DIFF WBC: CPT | Performed by: STUDENT IN AN ORGANIZED HEALTH CARE EDUCATION/TRAINING PROGRAM

## 2022-12-07 PROCEDURE — 20600001 HC STEP DOWN PRIVATE ROOM

## 2022-12-07 RX ORDER — CALCIUM GLUCONATE 20 MG/ML
1 INJECTION, SOLUTION INTRAVENOUS
Status: COMPLETED | OUTPATIENT
Start: 2022-12-07 | End: 2022-12-07

## 2022-12-07 RX ADMIN — Medication 10 ML: at 12:12

## 2022-12-07 RX ADMIN — CALCIUM GLUCONATE 1 G: 20 INJECTION, SOLUTION INTRAVENOUS at 10:12

## 2022-12-07 RX ADMIN — DEXTROSE AND SODIUM CHLORIDE: 5; .9 INJECTION, SOLUTION INTRAVENOUS at 04:12

## 2022-12-07 RX ADMIN — CALCIUM CARBONATE (ANTACID) CHEW TAB 500 MG 1000 MG: 500 CHEW TAB at 10:12

## 2022-12-07 RX ADMIN — CEFEPIME 2 G: 2 INJECTION, POWDER, FOR SOLUTION INTRAVENOUS at 09:12

## 2022-12-07 RX ADMIN — INSULIN ASPART 2 UNITS: 100 INJECTION, SOLUTION INTRAVENOUS; SUBCUTANEOUS at 01:12

## 2022-12-07 RX ADMIN — MICAFUNGIN SODIUM 100 MG: 100 INJECTION, POWDER, LYOPHILIZED, FOR SOLUTION INTRAVENOUS at 11:12

## 2022-12-07 RX ADMIN — Medication 10 ML: at 11:12

## 2022-12-07 RX ADMIN — CALCIUM CARBONATE (ANTACID) CHEW TAB 500 MG 1000 MG: 500 CHEW TAB at 02:12

## 2022-12-07 RX ADMIN — Medication 10 ML: at 06:12

## 2022-12-07 RX ADMIN — INSULIN ASPART 4 UNITS: 100 INJECTION, SOLUTION INTRAVENOUS; SUBCUTANEOUS at 03:12

## 2022-12-07 RX ADMIN — CEFEPIME 2 G: 2 INJECTION, POWDER, FOR SOLUTION INTRAVENOUS at 05:12

## 2022-12-07 RX ADMIN — DEXTROSE AND SODIUM CHLORIDE: 5; .9 INJECTION, SOLUTION INTRAVENOUS at 05:12

## 2022-12-07 RX ADMIN — POLYETHYLENE GLYCOL 3350 17 G: 17 POWDER, FOR SOLUTION ORAL at 09:12

## 2022-12-07 RX ADMIN — ENOXAPARIN SODIUM 40 MG: 40 INJECTION SUBCUTANEOUS at 05:12

## 2022-12-07 RX ADMIN — CALCIUM GLUCONATE 1 G: 20 INJECTION, SOLUTION INTRAVENOUS at 09:12

## 2022-12-07 RX ADMIN — VANCOMYCIN HYDROCHLORIDE 1500 MG: 1.5 INJECTION, POWDER, LYOPHILIZED, FOR SOLUTION INTRAVENOUS at 03:12

## 2022-12-07 RX ADMIN — CEFEPIME 2 G: 2 INJECTION, POWDER, FOR SOLUTION INTRAVENOUS at 02:12

## 2022-12-07 RX ADMIN — CALCIUM CARBONATE (ANTACID) CHEW TAB 500 MG 1000 MG: 500 CHEW TAB at 05:12

## 2022-12-07 RX ADMIN — CALCIUM CARBONATE (ANTACID) CHEW TAB 500 MG 1000 MG: 500 CHEW TAB at 09:12

## 2022-12-07 RX ADMIN — PANTOPRAZOLE SODIUM 40 MG: 40 INJECTION, POWDER, FOR SOLUTION INTRAVENOUS at 09:12

## 2022-12-07 RX ADMIN — CALCITRIOL 0.25 MCG: 1 SOLUTION ORAL at 09:12

## 2022-12-07 RX ADMIN — INSULIN ASPART 2 UNITS: 100 INJECTION, SOLUTION INTRAVENOUS; SUBCUTANEOUS at 12:12

## 2022-12-07 RX ADMIN — INSULIN ASPART 6 UNITS: 100 INJECTION, SOLUTION INTRAVENOUS; SUBCUTANEOUS at 06:12

## 2022-12-07 NOTE — SUBJECTIVE & OBJECTIVE
Interval History: No issues overnight. Patient tolerated trickle tube feeds.    Medications:  Continuous Infusions:   dextrose 5 % and 0.9 % NaCl 100 mL/hr at 12/07/22 0556     Scheduled Meds:   calcitrioL  0.25 mcg Per G Tube Daily    calcium carbonate  1,000 mg Per G Tube 5x Daily    ceFEPime (MAXIPIME) IVPB  2 g Intravenous Q8H    enoxaparin  40 mg Subcutaneous Daily    micafungin (MYCAMINE) IVPB  100 mg Intravenous Q24H    pantoprazole  40 mg Intravenous Daily    polyethylene glycol  17 g Per G Tube Daily    sodium chloride 0.9%  10 mL Intravenous Q6H    vancomycin (VANCOCIN) IVPB  1,500 mg Intravenous Q24H     PRN Meds:acetaminophen, dextrose 10%, dextrose 10%, glucagon (human recombinant), hydrALAZINE, insulin aspart U-100, ondansetron, oxyCODONE, promethazine (PHENERGAN) IVPB, senna-docusate 8.6-50 mg, sodium chloride 0.9%, Flushing PICC Protocol **AND** sodium chloride 0.9% **AND** sodium chloride 0.9%     Review of patient's allergies indicates:   Allergen Reactions    Lovastatin Itching     Objective:     Vital Signs (24h Range):  Temp:  [97 °F (36.1 °C)-97.9 °F (36.6 °C)] 97.9 °F (36.6 °C)  Pulse:  [66-85] 66  Resp:  [18-20] 20  SpO2:  [97 %-100 %] 99 %  BP: (114-122)/(60-65) 122/62       Lines/Drains/Airways       Peripherally Inserted Central Catheter Line  Duration             PICC Double Lumen 12/01/22 1017 right brachial 5 days              Drain  Duration                  Gastrostomy/Enterostomy LUQ -- days              Airway  Duration             Adult Surgical Airway Shiley Cuffed -- days         Laryngectomy (Do Not Remove) 11/09/22 Laryngectomy stoma 28 days              Peripheral Intravenous Line  Duration                  Peripheral IV - Single Lumen 11/28/22 1311 20 G;1 3/4 in Left;Anterior Forearm 8 days                    Physical Exam  NAD  Awake and alert  Mild facial edema   Trach within stoma, removed. Stoma appears healthy  Skin graft to midline neck overlying flap  Small area of  dehiscence left laterally, near area of STSG, dry  Left area with packing in place, removed and replaced.  Minimal secretions from wound.  Intraoral flap is soft without fluctuance, secretions thin, clear intraorally    Significant Labs:  CBC:   Recent Labs   Lab 12/07/22  0351   WBC 4.15   RBC 2.96*   HGB 8.9*   HCT 27.9*      MCV 94   MCH 30.1   MCHC 31.9*     CMP:   Recent Labs   Lab 12/07/22  0351   *   CALCIUM 6.9*   ALBUMIN 2.3*   PROT 5.7*      K 3.4*   CO2 21*      BUN 10   CREATININE 0.7   ALKPHOS 867*   ALT 74*   AST 44*   BILITOT 1.5*     Calcium corrects to 8.3    Significant Diagnostics:  None

## 2022-12-07 NOTE — NURSING
Spoke with dr foster clancy to hold the fluids in lieu of increasing tube feeding due to increase in his glucose wctm.

## 2022-12-07 NOTE — PLAN OF CARE
VSS . Accucheck were high . IVF d5NS continues at 100ml/hr and TF at 10 ml/hr . Covered elevated glucose with ss insulin SQ . Trach care done . Trach suctioning done twice . Trach at 28 % No acute distess . Denies pain . Sacral wound healing.Peg site unremarkable . Incision and graft donor sites healing without complications . PiCC does need proximal port declooted . Has PIV x 1 . Calcium was 6.9. On call MD notified . Continue care  Problem: Adult Inpatient Plan of Care  Goal: Plan of Care Review  Outcome: Ongoing, Progressing  Goal: Patient-Specific Goal (Individualized)  Outcome: Ongoing, Progressing  Goal: Absence of Hospital-Acquired Illness or Injury  Outcome: Ongoing, Progressing  Goal: Optimal Comfort and Wellbeing  Outcome: Ongoing, Progressing  Goal: Readiness for Transition of Care  Outcome: Ongoing, Progressing

## 2022-12-07 NOTE — PLAN OF CARE
Nael jean - [facility]  Discharge Reassessment    Primary Care Provider: Maico Patterson MD    Expected Discharge Date: 12/9/2022    Reassessment (most recent)       Discharge Reassessment - 12/07/22 1121          Discharge Reassessment    Assessment Type Discharge Planning Reassessment     Did the patient's condition or plan change since previous assessment? No     Discharge Plan discussed with: Patient     Communicated CHIARA with patient/caregiver Yes     Discharge Plan A Home Health     DME Needed Upon Discharge  nutrition supplies     Discharge Barriers Identified None     Why the patient remains in the hospital Requires continued medical care        Post-Acute Status    Post-Acute Authorization Home Health     Home Health Status Set-up Complete/Auth obtained     Coverage [employer] Managed Medicare     Hospital Resources/Appts/Education Provided Provided patient/caregiver with written discharge plan information     Patient choice form signed by patient/caregiver List with quality metrics by geographic area provided     Discharge Delays None known at this time                     Pt not ready for discharge due to: Not medically ready.  SW will remain available for families in [facility].  Currently pt has d/c plans in progress at this time.  Pt being followed by [facility] for d/c.     Pam Webber LCSW  Case Management/[facility]  [phone]

## 2022-12-07 NOTE — PROGRESS NOTES
Nael Carey - [facility]  Otorhinolaryngology-Head & Neck Surgery  Progress Note    Subjective:     Post-Op Info:  * No surgery found *      Hospital Day: 15     Interval History: No issues overnight. Patient tolerated trickle tube feeds.    Medications:  Continuous Infusions:   dextrose 5 % and 0.9 % NaCl 100 mL/hr at 12/07/22 0556     Scheduled Meds:   calcitrioL  0.25 mcg Per G Tube Daily    calcium carbonate  1,000 mg Per G Tube 5x Daily    ceFEPime (MAXIPIME) IVPB  2 g Intravenous Q8H    enoxaparin  40 mg Subcutaneous Daily    micafungin (MYCAMINE) IVPB  100 mg Intravenous Q24H    pantoprazole  40 mg Intravenous Daily    polyethylene glycol  17 g Per G Tube Daily    sodium chloride 0.9%  10 mL Intravenous Q6H    vancomycin (VANCOCIN) IVPB  1,500 mg Intravenous Q24H     PRN Meds:acetaminophen, dextrose 10%, dextrose 10%, glucagon (human recombinant), hydrALAZINE, insulin aspart U-100, ondansetron, oxyCODONE, promethazine (PHENERGAN) IVPB, senna-docusate 8.6-50 mg, sodium chloride 0.9%, Flushing PICC Protocol **AND** sodium chloride 0.9% **AND** sodium chloride 0.9%     Review of patient's allergies indicates:   Allergen Reactions    Lovastatin Itching     Objective:     Vital Signs (24h Range):  Temp:  [97 °F (36.1 °C)-97.9 °F (36.6 °C)] 97.9 °F (36.6 °C)  Pulse:  [66-85] 66  Resp:  [18-20] 20  SpO2:  [97 %-100 %] 99 %  BP: (114-122)/(60-65) 122/62       Lines/Drains/Airways       Peripherally Inserted Central Catheter Line  Duration             PICC Double Lumen 12/01/22 1017 right brachial 5 days              Drain  Duration                  Gastrostomy/Enterostomy LUQ -- days              Airway  Duration             Adult Surgical Airway Shiley Cuffed -- days         Laryngectomy (Do Not Remove) 11/09/22 Laryngectomy stoma 28 days              Peripheral Intravenous Line  Duration                  Peripheral IV - Single Lumen 11/28/22 1311 20 G;1 3/4 in Left;Anterior Forearm 8 days               "      Physical Exam  NAD  Awake and alert  Mild facial edema   Trach within stoma, removed. Stoma appears healthy  Skin graft to midline neck overlying flap  Small area of dehiscence left laterally, near area of STSG, dry  Left area with packing in place, removed and replaced.  Minimal secretions from wound.  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    Admitted for bacteremia and fungemia, port removed and ID recommending IV antibiotics at home. PICC placed 12/1. 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     ENT/HNS:  - Laryngectomy protocol   -Sign above bed + outside door stating patient is "neck breather" and "can not be intubated by mouth"   -Clean laryngectomy stoma as needed, or daily  - Keep tracheostomy in place     - OK to replace tracheostomy if dislodged, only in place for swelling/secretion assistance -- replaced 11/27  - Humidified O2 via trach collar  - CT with fluid collection, will continue packing changes      Neuro:   - Pain: Multimodality PRN regimen initiated     Cardiac:  - HDS  - H/O of paroxysmal a fib, will CTM      Pulmonary:   - PRN breathing treatments  - Humidified air per trach collar  - OK to remove tracheostomy for adequate suctioning     Renal:   - monitor Is and Os  - Cr " stable     Infectious Disease:  - Leukocytosis and BC positive for pseudomonas -- now improved WBC  - ID consulted; appreciate recommendations   - TTE unremarkable    - Continue BID packing changes   - Antibiotics per ID team; to end 12/11; PICC line placed   Antibiotics (From admission, onward)    Start     Stop Route Frequency Ordered    12/07/22 1600  vancomycin 1,500 mg in dextrose 5 % 250 mL IVPB         -- IV Every 24 hours (non-standard times) 12/06/22 1740    12/06/22 1700  ceFEPIme (MAXIPIME) 2 g in dextrose 5 % in water (D5W) 5 % 50 mL IVPB (MB+)         -- IV Every 8 hours (non-standard times) 12/06/22 1044    11/24/22 0900  mupirocin 2 % ointment         11/29 0859 Nasl 2 times daily 11/24/22 0319           Hematology/Oncology:  - H/H stable  - Lovenox DVT prophylaxis      FEN/GI  - Transaminitis and increased alk phos   - U/S of abdomen + liver dopppler     -- biliary sludge, otherwise unremarkable; doppler wnl   - Medicine consulted; appreciate recs     - CRP, ESR elevated     - Autoimmune workup negative      - Hepatitis screen negative   - Will hold TFs considering fistula   - Postoperative hypoparathyroidism   - Hypocalcemia on initial admission     - Continue tums QID, calcitriol   - Replace electrolytes as needed to keep K>4, Mg>2 --repleted K+ 11/27  - Bowel reg  - Protonix for GI prophylaxis, GERD  - STRICT nausea control ondansetron, phenergan  - KUB to rule out ileus given emesis       MSK  - Out of bed as tolerated  - STSG donor site healed     Dispo:  - Continue hospital stay, pending arrangement of  for tube feeds, trach supplies, and antibiotics          Henna Mckoy MD  Otorhinolaryngology-Head & Neck Surgery  [facility]

## 2022-12-07 NOTE — RESPIRATORY THERAPY
RAPID RESPONSE RESPIRATORY THERAPY PROACTIVE NOTE           Time of visit: 1155     Code Status: Full Code   : 1950  Bed: 1055/1055 A:   MRN: [id number]  Time spent at the bedside: < 15 min    SITUATION    Evaluated patient for: LDA Check     BACKGROUND    Patient has a past medical history of Hypothyroidism, PEG (percutaneous endoscopic gastrostomy) adjustment/replacement/removal, and Type 2 diabetes mellitus, without long-term current use of insulin.  Clinically Significant Surgical Hx: laryngectomy    24 Hours Vitals Range:  Temp:  [97 °F (36.1 °C)-98.6 °F (37 °C)]   Pulse:  [66-85]   Resp:  [18-21]   BP: (114-124)/(60-70)   SpO2:  [96 %-100 %]     Labs:    Recent Labs     22  0423 22  0351   *  134*  134* 136   K 3.9  3.9  3.9 3.4*     104  104 108   CO2 23  23  23 21*   CREATININE 0.7  0.7  0.7 0.7   *  152*  152* 200*        No results for input(s): PH, PCO2, PO2, HCO3, POCSATURATED, BE in the last 72 hours.    ASSESSMENT/INTERVENTIONS  Pt resting comfortably with no respiratory needs at this time.      Last VS   Temp: 98.6 °F (37 °C) (1113)  Pulse: 68 (1113)  Resp: 18 (1113)  BP: 124/70 (1113)  SpO2: 100 % (1113)      Extra ETT at bedside: y  Level of Consciousness: Level of Consciousness (AVPU): alert  Respiratory Effort: Respiratory Effort: Normal, Unlabored Expansion/Accessory Muscle Usage: Expansion/Accessory Muscles/Retractions: no use of accessory muscles, no retractions, expansion symmetric  All Lung Field Breath Sounds: All Lung Fields Breath Sounds: diminished  O2 Device/Concentration: T.C. 5/21%  Surgical airway: Laryngectomy  Ambu at bedside: Ambu bag with the patient?: Yes, Adult Ambu     Active Orders   Respiratory Care    Oxygen Continuous     Frequency: Continuous     Number of Occurrences: Until Specified     Order Questions:      Device type: Low flow      Device: Trach Collar      FiO2%: 28      Titrate O2 per  Oxygen Titration Protocol: Yes      To maintain SpO2 goal of: >= 90%      Notify MD of: Inability to achieve desired SpO2; Sudden change in patient status and requires 20% increase in FiO2; Patient requires >60% FiO2    Routine tracheostomy care     Frequency: Q12H     Number of Occurrences: Until Specified    Tracheostomy replacement Once     Frequency: Once     Number of Occurrences: 1 Occurrences     Order Comments: Patient's laryngectomy stoma trach tube (6-0 Shiley cuffed) replace with new trach of same size 11/27  Please place additional 6-0 Shiley cuffed trach at bedside, MD to replace prn.         RECOMMENDATIONS    We recommend: RRT Recs: Continue POC per primary team.      FOLLOW-UP    Please call back the Rapid Response RT, Antonio Roberts RRT at x [phone] for any questions or concerns.

## 2022-12-08 LAB
ALBUMIN SERPL BCP-MCNC: 2.4 G/DL (ref 3.5–5.2)
ALP SERPL-CCNC: 883 U/L (ref 55–135)
ALT SERPL W/O P-5'-P-CCNC: 74 U/L (ref 10–44)
ANION GAP SERPL CALC-SCNC: 9 MMOL/L (ref 8–16)
AST SERPL-CCNC: 40 U/L (ref 10–40)
BASOPHILS # BLD AUTO: 0.02 K/UL (ref 0–0.2)
BASOPHILS NFR BLD: 0.5 % (ref 0–1.9)
BILIRUB SERPL-MCNC: 1.5 MG/DL (ref 0.1–1)
BUN SERPL-MCNC: 8 MG/DL (ref 8–23)
CALCIUM SERPL-MCNC: 7.2 MG/DL (ref 8.7–10.5)
CHLORIDE SERPL-SCNC: 106 MMOL/L (ref 95–110)
CO2 SERPL-SCNC: 21 MMOL/L (ref 23–29)
CREAT SERPL-MCNC: 0.8 MG/DL (ref 0.5–1.4)
DIFFERENTIAL METHOD: ABNORMAL
EOSINOPHIL # BLD AUTO: 0.1 K/UL (ref 0–0.5)
EOSINOPHIL NFR BLD: 3.2 % (ref 0–8)
ERYTHROCYTE [DISTWIDTH] IN BLOOD BY AUTOMATED COUNT: 15.6 % (ref 11.5–14.5)
EST. GFR  (NO RACE VARIABLE): >60 ML/MIN/1.73 M^2
GLUCOSE SERPL-MCNC: 137 MG/DL (ref 70–110)
HCT VFR BLD AUTO: 28.3 % (ref 40–54)
HGB BLD-MCNC: 9.2 G/DL (ref 14–18)
IMM GRANULOCYTES # BLD AUTO: 0.07 K/UL (ref 0–0.04)
IMM GRANULOCYTES NFR BLD AUTO: 1.6 % (ref 0–0.5)
LYMPHOCYTES # BLD AUTO: 0.5 K/UL (ref 1–4.8)
LYMPHOCYTES NFR BLD: 12 % (ref 18–48)
MCH RBC QN AUTO: 30.4 PG (ref 27–31)
MCHC RBC AUTO-ENTMCNC: 32.5 G/DL (ref 32–36)
MCV RBC AUTO: 93 FL (ref 82–98)
MONOCYTES # BLD AUTO: 0.3 K/UL (ref 0.3–1)
MONOCYTES NFR BLD: 6.7 % (ref 4–15)
NEUTROPHILS # BLD AUTO: 3.3 K/UL (ref 1.8–7.7)
NEUTROPHILS NFR BLD: 76 % (ref 38–73)
NRBC BLD-RTO: 0 /100 WBC
PLATELET # BLD AUTO: 217 K/UL (ref 150–450)
PMV BLD AUTO: 10.5 FL (ref 9.2–12.9)
POCT GLUCOSE: 161 MG/DL (ref 70–110)
POCT GLUCOSE: 222 MG/DL (ref 70–110)
POCT GLUCOSE: 261 MG/DL (ref 70–110)
POTASSIUM SERPL-SCNC: 3.4 MMOL/L (ref 3.5–5.1)
PROT SERPL-MCNC: 5.8 G/DL (ref 6–8.4)
RBC # BLD AUTO: 3.03 M/UL (ref 4.6–6.2)
SODIUM SERPL-SCNC: 136 MMOL/L (ref 136–145)
WBC # BLD AUTO: 4.33 K/UL (ref 3.9–12.7)

## 2022-12-08 PROCEDURE — 63700000 PHARM REV CODE 250 ALT 637 W/O HCPCS: Performed by: STUDENT IN AN ORGANIZED HEALTH CARE EDUCATION/TRAINING PROGRAM

## 2022-12-08 PROCEDURE — 94761 N-INVAS EAR/PLS OXIMETRY MLT: CPT

## 2022-12-08 PROCEDURE — 63600175 PHARM REV CODE 636 W HCPCS: Mod: JG | Performed by: STUDENT IN AN ORGANIZED HEALTH CARE EDUCATION/TRAINING PROGRAM

## 2022-12-08 PROCEDURE — 25000003 PHARM REV CODE 250: Performed by: STUDENT IN AN ORGANIZED HEALTH CARE EDUCATION/TRAINING PROGRAM

## 2022-12-08 PROCEDURE — 31603 EMER TRACHEOSTOMY TTRACH: CPT

## 2022-12-08 PROCEDURE — 80053 COMPREHEN METABOLIC PANEL: CPT | Performed by: STUDENT IN AN ORGANIZED HEALTH CARE EDUCATION/TRAINING PROGRAM

## 2022-12-08 PROCEDURE — 63600175 PHARM REV CODE 636 W HCPCS: Performed by: STUDENT IN AN ORGANIZED HEALTH CARE EDUCATION/TRAINING PROGRAM

## 2022-12-08 PROCEDURE — 25000003 PHARM REV CODE 250

## 2022-12-08 PROCEDURE — 85025 COMPLETE CBC W/AUTO DIFF WBC: CPT | Performed by: STUDENT IN AN ORGANIZED HEALTH CARE EDUCATION/TRAINING PROGRAM

## 2022-12-08 PROCEDURE — A4216 STERILE WATER/SALINE, 10 ML: HCPCS | Performed by: OTOLARYNGOLOGY

## 2022-12-08 PROCEDURE — 20600001 HC STEP DOWN PRIVATE ROOM

## 2022-12-08 PROCEDURE — 63600175 PHARM REV CODE 636 W HCPCS

## 2022-12-08 PROCEDURE — 25000003 PHARM REV CODE 250: Performed by: OTOLARYNGOLOGY

## 2022-12-08 PROCEDURE — 99900026 HC AIRWAY MAINTENANCE (STAT)

## 2022-12-08 PROCEDURE — 63600175 PHARM REV CODE 636 W HCPCS: Performed by: OTOLARYNGOLOGY

## 2022-12-08 RX ORDER — PANTOPRAZOLE SODIUM 40 MG/1
40 FOR SUSPENSION ORAL DAILY
Status: DISCONTINUED | OUTPATIENT
Start: 2022-12-08 | End: 2022-12-13 | Stop reason: HOSPADM

## 2022-12-08 RX ORDER — POTASSIUM CHLORIDE 7.45 MG/ML
10 INJECTION INTRAVENOUS 2 TIMES DAILY
Status: COMPLETED | OUTPATIENT
Start: 2022-12-08 | End: 2022-12-08

## 2022-12-08 RX ADMIN — Medication 10 ML: at 06:12

## 2022-12-08 RX ADMIN — CEFEPIME 2 G: 2 INJECTION, POWDER, FOR SOLUTION INTRAVENOUS at 08:12

## 2022-12-08 RX ADMIN — CALCIUM CARBONATE (ANTACID) CHEW TAB 500 MG 1000 MG: 500 CHEW TAB at 06:12

## 2022-12-08 RX ADMIN — POLYETHYLENE GLYCOL 3350 17 G: 17 POWDER, FOR SOLUTION ORAL at 09:12

## 2022-12-08 RX ADMIN — Medication 10 ML: at 12:12

## 2022-12-08 RX ADMIN — INSULIN ASPART 6 UNITS: 100 INJECTION, SOLUTION INTRAVENOUS; SUBCUTANEOUS at 06:12

## 2022-12-08 RX ADMIN — POTASSIUM CHLORIDE 10 MEQ: 7.46 INJECTION, SOLUTION INTRAVENOUS at 09:12

## 2022-12-08 RX ADMIN — CALCIUM CARBONATE (ANTACID) CHEW TAB 500 MG 1000 MG: 500 CHEW TAB at 05:12

## 2022-12-08 RX ADMIN — Medication 10 ML: at 11:12

## 2022-12-08 RX ADMIN — INSULIN ASPART 2 UNITS: 100 INJECTION, SOLUTION INTRAVENOUS; SUBCUTANEOUS at 01:12

## 2022-12-08 RX ADMIN — CEFEPIME 2 G: 2 INJECTION, POWDER, FOR SOLUTION INTRAVENOUS at 04:12

## 2022-12-08 RX ADMIN — MICAFUNGIN SODIUM 100 MG: 100 INJECTION, POWDER, LYOPHILIZED, FOR SOLUTION INTRAVENOUS at 09:12

## 2022-12-08 RX ADMIN — ENOXAPARIN SODIUM 40 MG: 40 INJECTION SUBCUTANEOUS at 05:12

## 2022-12-08 RX ADMIN — CALCIUM CARBONATE (ANTACID) CHEW TAB 500 MG 1000 MG: 500 CHEW TAB at 11:12

## 2022-12-08 RX ADMIN — VANCOMYCIN HYDROCHLORIDE 1500 MG: 1.5 INJECTION, POWDER, LYOPHILIZED, FOR SOLUTION INTRAVENOUS at 05:12

## 2022-12-08 RX ADMIN — CALCITRIOL 0.25 MCG: 1 SOLUTION ORAL at 09:12

## 2022-12-08 RX ADMIN — Medication 10 ML: at 05:12

## 2022-12-08 RX ADMIN — CALCIUM CARBONATE (ANTACID) CHEW TAB 500 MG 1000 MG: 500 CHEW TAB at 02:12

## 2022-12-08 RX ADMIN — CEFEPIME 2 G: 2 INJECTION, POWDER, FOR SOLUTION INTRAVENOUS at 01:12

## 2022-12-08 RX ADMIN — CALCIUM CARBONATE (ANTACID) CHEW TAB 500 MG 1000 MG: 500 CHEW TAB at 10:12

## 2022-12-08 RX ADMIN — POTASSIUM CHLORIDE 10 MEQ: 7.46 INJECTION, SOLUTION INTRAVENOUS at 08:12

## 2022-12-08 RX ADMIN — PANTOPRAZOLE SODIUM 40 MG: 40 GRANULE, DELAYED RELEASE ORAL at 09:12

## 2022-12-08 NOTE — PROGRESS NOTES
"Nael Carey - [facility]  Otorhinolaryngology-Head & Neck Surgery  Progress Note    Subjective:     Interval History: No issues overnight. Patient tolerated trickle tube feeds.    Objective:     Vital Signs (24h Range):  Temp:  [96.2 °F (35.7 °C)-98.9 °F (37.2 °C)] 96.2 °F (35.7 °C)  Pulse:  [66-71] 68  Resp:  [16-21] 18  SpO2:  [96 %-100 %] 99 %  BP: (113-138)/(62-72) 113/72     Physical Exam  NAD  Awake and alert  Mild facial edema   Trach within stoma, removed. Stoma appears healthy  Skin graft to midline neck overlying flap  Small area of dehiscence left laterally, near area of STSG, minimal drainage   Left area with packing in place, removed and replaced.  Minimal secretions from wound.  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    Admitted for bacteremia and fungemia, port removed and ID recommending IV antibiotics at home. PICC placed 12/1. 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     ENT/HNS:  - Laryngectomy protocol   -Sign above bed + outside door stating patient is "neck breather" and "can not be intubated by mouth"   -Clean laryngectomy stoma as needed, or daily  - Keep tracheostomy in place     - OK to replace tracheostomy if dislodged, only in place for swelling/secretion assistance -- replaced 11/27  - Humidified O2 via trach collar  - CT with fluid collection, will continue packing changes      Neuro:   - Pain: Multimodality PRN regimen " initiated     Cardiac:  - HDS  - H/O of paroxysmal a fib, will CTM      Pulmonary:   - PRN breathing treatments  - Humidified air per trach collar  - OK to remove tracheostomy for adequate suctioning     Renal:   - monitor Is and Os  - Cr stable     Infectious Disease:  - Leukocytosis and BC positive for pseudomonas -- now improved WBC  - ID consulted; appreciate recommendations   - TTE unremarkable    - Continue BID packing changes   - Antibiotics per ID team; to end 12/11; PICC line placed   Antibiotics (From admission, onward)    Start     Stop Route Frequency Ordered    12/07/22 1600  vancomycin 1,500 mg in dextrose 5 % 250 mL IVPB         -- IV Every 24 hours (non-standard times) 12/06/22 1740    12/06/22 1700  ceFEPIme (MAXIPIME) 2 g in dextrose 5 % in water (D5W) 5 % 50 mL IVPB (MB+)         -- IV Every 8 hours (non-standard times) 12/06/22 1044    11/24/22 0900  mupirocin 2 % ointment         11/29 0859 Nasl 2 times daily 11/24/22 0319           Hematology/Oncology:  - H/H stable  - Lovenox DVT prophylaxis      FEN/GI  - Transaminitis and increased alk phos   - U/S of abdomen + liver dopppler     -- biliary sludge, otherwise unremarkable; doppler wnl   - Medicine consulted; appreciate recs     - CRP, ESR elevated     - Autoimmune workup negative      - Hepatitis screen negative   - slowly advance TFs    - Postoperative hypoparathyroidism   - Hypocalcemia on initial admission     - Continue tums QID, calcitriol   - Replace electrolytes as needed to keep K>4, Mg>2 --repleted K+ 11/27  - Bowel reg  - Protonix for GI prophylaxis, GERD  - STRICT nausea control ondansetron, phenergan        MSK  - Out of bed as tolerated  - STSG donor site healed     Dispo:  - Continue hospital stay, pending improvement of drainage from fistula, arrangement of HH for tube feeds, trach supplies, and antibiotics

## 2022-12-08 NOTE — PLAN OF CARE
Recommendations    1) Continue tube feeds of Peptamen 1.5 with Prebio - rec'd increase to goal rate of 55 ml/hr to better meet kcal/protein needs. Provides 1980 kcal, 90g protein, 1014ml fluid.     2) RD to monitor and f/u.    Goals: Meet % of kcal/protein needs by RD f/u date  Nutrition Goal Status: progressing towards goal  Communication of RD Recs:  (POC)

## 2022-12-08 NOTE — PLAN OF CARE
Aao x 3  Tolerating peg tube increase  Suctioned trach with minimal output  Bm noted this morning  Voids per urinal   Turn on his own q 2   Slept majority of the day  No pain noted    Problem: Adult Inpatient Plan of Care  Goal: Plan of Care Review  Outcome: Ongoing, Progressing  Goal: Patient-Specific Goal (Individualized)  Outcome: Ongoing, Progressing  Goal: Absence of Hospital-Acquired Illness or Injury  Outcome: Ongoing, Progressing  Goal: Optimal Comfort and Wellbeing  Outcome: Ongoing, Progressing  Goal: Readiness for Transition of Care  Outcome: Ongoing, Progressing

## 2022-12-08 NOTE — SUBJECTIVE & OBJECTIVE
Interval History: No issues overnight. Patient tolerated trickle tube feeds.    Medications:  Continuous Infusions:   dextrose 5 % and 0.9 % NaCl Stopped (12/07/22 1632)     Scheduled Meds:   calcitrioL  0.25 mcg Per G Tube Daily    calcium carbonate  1,000 mg Per G Tube 5x Daily    ceFEPime (MAXIPIME) IVPB  2 g Intravenous Q8H    enoxaparin  40 mg Subcutaneous Daily    micafungin (MYCAMINE) IVPB  100 mg Intravenous Q24H    pantoprazole  40 mg Intravenous Daily    polyethylene glycol  17 g Per G Tube Daily    sodium chloride 0.9%  10 mL Intravenous Q6H    vancomycin (VANCOCIN) IVPB  1,500 mg Intravenous Q24H     PRN Meds:acetaminophen, dextrose 10%, dextrose 10%, glucagon (human recombinant), hydrALAZINE, insulin aspart U-100, ondansetron, oxyCODONE, promethazine (PHENERGAN) IVPB, senna-docusate 8.6-50 mg, sodium chloride 0.9%, Flushing PICC Protocol **AND** sodium chloride 0.9% **AND** sodium chloride 0.9%     Review of patient's allergies indicates:   Allergen Reactions    Lovastatin Itching     Objective:     Vital Signs (24h Range):  Temp:  [96.2 °F (35.7 °C)-98.9 °F (37.2 °C)] 96.2 °F (35.7 °C)  Pulse:  [66-71] 68  Resp:  [16-21] 18  SpO2:  [96 %-100 %] 99 %  BP: (113-138)/(62-72) 113/72       Lines/Drains/Airways       Peripherally Inserted Central Catheter Line  Duration             PICC Double Lumen 12/01/22 1017 right brachial 6 days              Drain  Duration                  Gastrostomy/Enterostomy LUQ -- days              Airway  Duration             Adult Surgical Airway Shiley Cuffed -- days         Laryngectomy (Do Not Remove) 11/09/22 Laryngectomy stoma 29 days              Peripheral Intravenous Line  Duration                  Peripheral IV - Single Lumen 11/28/22 1311 20 G;1 3/4 in Left;Anterior Forearm 9 days                    Physical Exam  NAD  Awake and alert  Mild facial edema   Trach within stoma, removed. Stoma appears healthy  Skin graft to midline neck overlying flap  Small area of  dehiscence left laterally, near area of STSG, minimal drainage   Left area with packing in place, removed and replaced.  Minimal secretions from wound.  Intraoral flap is soft without fluctuance, secretions thin, clear intraorally    Significant Labs:  CBC:   Recent Labs   Lab 12/08/22  0444   WBC 4.33   RBC 3.03*   HGB 9.2*   HCT 28.3*      MCV 93   MCH 30.4   MCHC 32.5       CMP:   Recent Labs   Lab 12/08/22 0444   *   CALCIUM 7.2*   ALBUMIN 2.4*   PROT 5.8*      K 3.4*   CO2 21*      BUN 8   CREATININE 0.8   ALKPHOS 883*   ALT 74*   AST 40   BILITOT 1.5*            Significant Diagnostics:  None

## 2022-12-08 NOTE — PLAN OF CARE
Pt is A&Ox4 injury free. Breathing is regular equal and unlabored bilaterally with humidified oxygen 5L 28 %. Pt denied pain. The nurse was asked to suction pt 3 times during night shift. Tube feeding was increased by 10 cc at 1900 0100 and 0700, see chart. Antibiotics were given as ordered. Pt used urinal at bed side.

## 2022-12-08 NOTE — ASSESSMENT & PLAN NOTE
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    Admitted for bacteremia and fungemia, port removed and ID recommending IV antibiotics at home. PICC placed 12/1. 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     ENT/HNS:  - Laryngectomy protocol   -Sign above bed + outside door stating patient is "neck breather" and "can not be intubated by mouth"   -Clean laryngectomy stoma as needed, or daily  - Keep tracheostomy in place     - OK to replace tracheostomy if dislodged, only in place for swelling/secretion assistance -- replaced 11/27  - Humidified O2 via trach collar  - CT with fluid collection, will continue packing changes      Neuro:   - Pain: Multimodality PRN regimen initiated     Cardiac:  - HDS  - H/O of paroxysmal a fib, will CTM      Pulmonary:   - PRN breathing treatments  - Humidified air per trach collar  - OK to remove tracheostomy for adequate suctioning     Renal:   - monitor Is and Os  - Cr stable     Infectious Disease:  - Leukocytosis and BC positive for pseudomonas -- now improved WBC  - ID consulted; appreciate recommendations   - TTE unremarkable    - Continue BID packing changes   - Antibiotics per ID team; to end 12/11; PICC line placed   Antibiotics (From admission, onward)    Start     Stop Route Frequency Ordered    12/07/22 1600  vancomycin 1,500 mg in dextrose 5 % 250 mL IVPB         -- IV Every 24 hours (non-standard times) 12/06/22 1740    12/06/22 1700  ceFEPIme (MAXIPIME) 2 g in dextrose 5 % in water (D5W) 5 % 50 mL IVPB (MB+)         -- IV Every 8 hours (non-standard times) 12/06/22 1044    11/24/22 0900  mupirocin 2 % ointment         11/29 0859 Nasl 2 times daily 11/24/22 0319           Hematology/Oncology:  - H/H stable  - Lovenox DVT prophylaxis      FEN/GI  - Transaminitis and increased alk phos   - U/S of abdomen + " liver dopppler     -- biliary sludge, otherwise unremarkable; doppler wnl   - Medicine consulted; appreciate recs     - CRP, ESR elevated     - Autoimmune workup negative      - Hepatitis screen negative   - slowly advance TFs    - Postoperative hypoparathyroidism   - Hypocalcemia on initial admission     - Continue tums QID, calcitriol   - Replace electrolytes as needed to keep K>4, Mg>2 --repleted K+ 11/27  - Bowel reg  - Protonix for GI prophylaxis, GERD  - STRICT nausea control ondansetron, phenergan        MSK  - Out of bed as tolerated  - STSG donor site healed     Dispo:  - Continue hospital stay, pending arrangement of  for tube feeds, trach supplies, and antibiotics

## 2022-12-08 NOTE — PROGRESS NOTES
"Nael jean [facility]  Adult Nutrition  Progress Note    SUMMARY       Recommendations    1) Continue tube feeds of Peptamen 1.5 with Prebio - rec'd increase to goal rate of 55 ml/hr to better meet kcal/protein needs. Provides 1980 kcal, 90g protein, 1014ml fluid.     2) RD to monitor and f/u.    Goals: Meet % of kcal/protein needs by RD f/u date  Nutrition Goal Status: progressing towards goal  Communication of RD Recs:  (POC)    Assessment and Plan    Nutrition Problem  Inadequate oral intake     Related to (etiology):   Decreased ability to consume sufficient needs     Signs and Symptoms (as evidenced by):   NPO          Interventions/Recommendations (treatment strategy):  Collaboration of nutrition care with other providers  EN     Nutrition Diagnosis Status:   Continues     Reason for Assessment    Reason For Assessment: RD follow-up  Diagnosis:  (SOB)  Relevant Medical History: hypertension, hypothyroidism, diabetes type 2, GERD , laryngeal cancer and malignant neoplasm of base of tongue  who underwent a total glossectomy pharyngectomy with lateral thigh reconstruction and skin graft on 11/09/2022 at [facility]  Interdisciplinary Rounds: did not attend  General Information Comments: Pt seen for f/u, TF running at goal rate. Endorses tolerance, no bloating, N/V/D/C. Pts nourished, RD following.  Nutrition Discharge Planning: adequate nutrition via PEG tube    Nutrition Risk Screen    Nutrition Risk Screen: tube feeding or parenteral nutrition      Anthropometrics    Temp: 98.1 °F (36.7 °C)  Height: 5' 6" (167.6 cm)  Height (inches): 66 in  Weight Method: Standard Scale  Weight: 62.1 kg (137 lb)  Weight (lb): 137 lb  Ideal Body Weight (IBW), Male: 142 lb  % Ideal Body Weight, Male (lb): 96.48 %  BMI (Calculated): 22.1       Lab/Procedures/Meds    Pertinent Labs Reviewed: reviewed  Pertinent Labs Comments: H/H: 9.2/28.3, Potassium:3.4, Glu:137, supa: 7.2, alk phos: 883, ALT:74  Pertinent Medications Reviewed: " reviewed  Pertinent Medications Comments: calcitriol, calcium carbonate, enoxaparin, pantoprazole, potassium chloride, ABX, D5      Estimated/Assessed Needs    Weight Used For Calorie Calculations: 62.1 kg (136 lb 14.5 oz)  Energy Calorie Requirements (kcal): 1863 kcal/day  Energy Need Method: Kcal/kg (30-35 kcal/kg)  Protein Requirements: 75-93g pro/day (1.2-1.5 g/kg)  Weight Used For Protein Calculations: 62.1 kg (136 lb 14.5 oz)  Fluid Requirements (mL): 1 ml/kcal or fluid per MD     RDA Method (mL): 1863         Nutrition Prescription Ordered    Current Diet Order: NPO  Current Nutrition Support Formula Ordered: Peptamen 1.5 w/Prebio  Current Nutrition Support Rate Ordered: 40 (ml)  Current Nutrition Support Frequency Ordered: ml/hr x 24 hr    Evaluation of Received Nutrient/Fluid Intake    Enteral Calories (kcal): 1440  Enteral Protein (gm): 65  Enteral (Free Water) Fluid (mL): 737  % Kcal Needs: 77%  % Protein Needs: 87%  I/O: -93  Energy Calories Required: not meeting needs  Protein Required: not meeting needs  Comments: LBM: 12/8  Tolerance: tolerating  % Intake of Estimated Energy Needs: 75 - 100 %  % Meal Intake: 75 - 100 %      Monitor and Evaluation    Food and Nutrient Intake: enteral nutrition intake  Food and Nutrient Adminstration: enteral and parenteral nutrition administration  Anthropometric Measurements: weight, weight change, body mass index  Biochemical Data, Medical Tests and Procedures: electrolyte and renal panel, gastrointestinal profile, glucose/endocrine profile, inflammatory profile, lipid profile  Nutrition-Focused Physical Findings: overall appearance, extremities, muscles and bones     Nutrition Follow-Up    RD Follow-up?: Yes    Milagro Menchaca, Registration Eligible, Provisional LDN

## 2022-12-08 NOTE — RESPIRATORY THERAPY
RAPID RESPONSE RESPIRATORY THERAPY PROACTIVE NOTE           Time of visit: 849     Code Status: Full Code   : 1950  Bed: 1055/1055 A:   MRN: [id number]  Time spent at the bedside: < 15 min    SITUATION    Evaluated patient for: LDA Check     BACKGROUND    Patient has a past medical history of Hypothyroidism, PEG (percutaneous endoscopic gastrostomy) adjustment/replacement/removal, and Type 2 diabetes mellitus, without long-term current use of insulin.  Clinically Significant Surgical Hx: tracheostomy    24 Hours Vitals Range:  Temp:  [96.2 °F (35.7 °C)-98.9 °F (37.2 °C)]   Pulse:  [63-71]   Resp:  [16-19]   BP: (103-138)/(59-72)   SpO2:  [97 %-100 %]     Labs:    Recent Labs     22  0423 22  0351 22  0444   *  134*  134* 136 136   K 3.9  3.9  3.9 3.4* 3.4*     104  104 108 106   CO2 23  23  23 21* 21*   CREATININE 0.7  0.7  0.7 0.7 0.8   *  152*  152* 200* 137*        No results for input(s): PH, PCO2, PO2, HCO3, POCSATURATED, BE in the last 72 hours.    ASSESSMENT/INTERVENTIONS  Resting comfortably with no respiratory needs at this time.      Last VS   Temp: 97.4 °F (36.3 °C) (753)  Pulse: 63 (753)  Resp: 16 (753)  BP: 103/59 (753)  SpO2: 99 % (753)      Extra trachs at bedside: y  Level of Consciousness: Level of Consciousness (AVPU): alert  Respiratory Effort: Respiratory Effort: Normal, Unlabored Expansion/Accessory Muscle Usage: Expansion/Accessory Muscles/Retractions: no use of accessory muscles, no retractions, expansion symmetric  All Lung Field Breath Sounds: All Lung Fields Breath Sounds: diminished  O2 Device/Concentration: T.C 5L/28%  Surgical airway: Laryngectomy  Ambu at bedside: Ambu bag with the patient?: Yes, Adult Ambu     Active Orders   Respiratory Care    Oxygen Continuous     Frequency: Continuous     Number of Occurrences: Until Specified     Order Questions:      Device type: Low flow      Device: Trach  Collar      FiO2%: 28      Titrate O2 per Oxygen Titration Protocol: Yes      To maintain SpO2 goal of: >= 90%      Notify MD of: Inability to achieve desired SpO2; Sudden change in patient status and requires 20% increase in FiO2; Patient requires >60% FiO2    Routine tracheostomy care     Frequency: Q12H     Number of Occurrences: Until Specified    Tracheostomy replacement Once     Frequency: Once     Number of Occurrences: 1 Occurrences     Order Comments: Patient's laryngectomy stoma trach tube (6-0 Shiley cuffed) replace with new trach of same size 11/27  Please place additional 6-0 Shiley cuffed trach at bedside, MD to replace prn.         RECOMMENDATIONS    We recommend: RRT Recs: Continue POC per primary team.      FOLLOW-UP    Please call back the Rapid Response RT, Antonio Roberts, JERRY at x [phone] for any questions or concerns.

## 2022-12-09 LAB
ALBUMIN SERPL BCP-MCNC: 2.3 G/DL (ref 3.5–5.2)
ALP SERPL-CCNC: 885 U/L (ref 55–135)
ALT SERPL W/O P-5'-P-CCNC: 65 U/L (ref 10–44)
ANION GAP SERPL CALC-SCNC: 9 MMOL/L (ref 8–16)
AST SERPL-CCNC: 35 U/L (ref 10–40)
BASOPHILS # BLD AUTO: 0.02 K/UL (ref 0–0.2)
BASOPHILS NFR BLD: 0.5 % (ref 0–1.9)
BILIRUB SERPL-MCNC: 1.2 MG/DL (ref 0.1–1)
BUN SERPL-MCNC: 11 MG/DL (ref 8–23)
CALCIUM SERPL-MCNC: 7.2 MG/DL (ref 8.7–10.5)
CHLORIDE SERPL-SCNC: 106 MMOL/L (ref 95–110)
CO2 SERPL-SCNC: 19 MMOL/L (ref 23–29)
CREAT SERPL-MCNC: 0.7 MG/DL (ref 0.5–1.4)
DIFFERENTIAL METHOD: ABNORMAL
EOSINOPHIL # BLD AUTO: 0.1 K/UL (ref 0–0.5)
EOSINOPHIL NFR BLD: 2.5 % (ref 0–8)
ERYTHROCYTE [DISTWIDTH] IN BLOOD BY AUTOMATED COUNT: 15.7 % (ref 11.5–14.5)
EST. GFR  (NO RACE VARIABLE): >60 ML/MIN/1.73 M^2
GLUCOSE SERPL-MCNC: 232 MG/DL (ref 70–110)
HCT VFR BLD AUTO: 28.4 % (ref 40–54)
HGB BLD-MCNC: 9.2 G/DL (ref 14–18)
IMM GRANULOCYTES # BLD AUTO: 0.06 K/UL (ref 0–0.04)
IMM GRANULOCYTES NFR BLD AUTO: 1.5 % (ref 0–0.5)
LYMPHOCYTES # BLD AUTO: 0.5 K/UL (ref 1–4.8)
LYMPHOCYTES NFR BLD: 11.4 % (ref 18–48)
MCH RBC QN AUTO: 30.2 PG (ref 27–31)
MCHC RBC AUTO-ENTMCNC: 32.4 G/DL (ref 32–36)
MCV RBC AUTO: 93 FL (ref 82–98)
MONOCYTES # BLD AUTO: 0.3 K/UL (ref 0.3–1)
MONOCYTES NFR BLD: 7.4 % (ref 4–15)
NEUTROPHILS # BLD AUTO: 3.1 K/UL (ref 1.8–7.7)
NEUTROPHILS NFR BLD: 76.7 % (ref 38–73)
NRBC BLD-RTO: 0 /100 WBC
PLATELET # BLD AUTO: 185 K/UL (ref 150–450)
PMV BLD AUTO: 10.5 FL (ref 9.2–12.9)
POCT GLUCOSE: 167 MG/DL (ref 70–110)
POCT GLUCOSE: 194 MG/DL (ref 70–110)
POCT GLUCOSE: 220 MG/DL (ref 70–110)
POCT GLUCOSE: 246 MG/DL (ref 70–110)
POCT GLUCOSE: 262 MG/DL (ref 70–110)
POTASSIUM SERPL-SCNC: 3.9 MMOL/L (ref 3.5–5.1)
PROT SERPL-MCNC: 5.7 G/DL (ref 6–8.4)
RBC # BLD AUTO: 3.05 M/UL (ref 4.6–6.2)
SODIUM SERPL-SCNC: 134 MMOL/L (ref 136–145)
VANCOMYCIN TROUGH SERPL-MCNC: 16.1 UG/ML (ref 10–22)
WBC # BLD AUTO: 4.05 K/UL (ref 3.9–12.7)

## 2022-12-09 PROCEDURE — 63600175 PHARM REV CODE 636 W HCPCS: Performed by: OTOLARYNGOLOGY

## 2022-12-09 PROCEDURE — 27000221 HC OXYGEN, UP TO 24 HOURS

## 2022-12-09 PROCEDURE — 63600175 PHARM REV CODE 636 W HCPCS: Mod: JG | Performed by: STUDENT IN AN ORGANIZED HEALTH CARE EDUCATION/TRAINING PROGRAM

## 2022-12-09 PROCEDURE — 94761 N-INVAS EAR/PLS OXIMETRY MLT: CPT

## 2022-12-09 PROCEDURE — 99900026 HC AIRWAY MAINTENANCE (STAT)

## 2022-12-09 PROCEDURE — A4216 STERILE WATER/SALINE, 10 ML: HCPCS | Performed by: OTOLARYNGOLOGY

## 2022-12-09 PROCEDURE — 25000003 PHARM REV CODE 250: Performed by: STUDENT IN AN ORGANIZED HEALTH CARE EDUCATION/TRAINING PROGRAM

## 2022-12-09 PROCEDURE — 25000003 PHARM REV CODE 250

## 2022-12-09 PROCEDURE — 25000003 PHARM REV CODE 250: Performed by: OTOLARYNGOLOGY

## 2022-12-09 PROCEDURE — 63700000 PHARM REV CODE 250 ALT 637 W/O HCPCS: Performed by: STUDENT IN AN ORGANIZED HEALTH CARE EDUCATION/TRAINING PROGRAM

## 2022-12-09 PROCEDURE — 85025 COMPLETE CBC W/AUTO DIFF WBC: CPT | Performed by: STUDENT IN AN ORGANIZED HEALTH CARE EDUCATION/TRAINING PROGRAM

## 2022-12-09 PROCEDURE — 99900035 HC TECH TIME PER 15 MIN (STAT)

## 2022-12-09 PROCEDURE — 20600001 HC STEP DOWN PRIVATE ROOM

## 2022-12-09 PROCEDURE — 80202 ASSAY OF VANCOMYCIN: CPT | Performed by: OTOLARYNGOLOGY

## 2022-12-09 PROCEDURE — 80053 COMPREHEN METABOLIC PANEL: CPT | Performed by: STUDENT IN AN ORGANIZED HEALTH CARE EDUCATION/TRAINING PROGRAM

## 2022-12-09 PROCEDURE — 63600175 PHARM REV CODE 636 W HCPCS

## 2022-12-09 RX ADMIN — Medication 10 ML: at 06:12

## 2022-12-09 RX ADMIN — CEFEPIME 2 G: 2 INJECTION, POWDER, FOR SOLUTION INTRAVENOUS at 09:12

## 2022-12-09 RX ADMIN — CEFEPIME 2 G: 2 INJECTION, POWDER, FOR SOLUTION INTRAVENOUS at 05:12

## 2022-12-09 RX ADMIN — CALCITRIOL 0.25 MCG: 1 SOLUTION ORAL at 09:12

## 2022-12-09 RX ADMIN — VANCOMYCIN HYDROCHLORIDE 1500 MG: 1.5 INJECTION, POWDER, LYOPHILIZED, FOR SOLUTION INTRAVENOUS at 05:12

## 2022-12-09 RX ADMIN — Medication 10 ML: at 12:12

## 2022-12-09 RX ADMIN — ALTEPLASE 2 MG: 2.2 INJECTION, POWDER, LYOPHILIZED, FOR SOLUTION INTRAVENOUS at 03:12

## 2022-12-09 RX ADMIN — INSULIN ASPART 2 UNITS: 100 INJECTION, SOLUTION INTRAVENOUS; SUBCUTANEOUS at 12:12

## 2022-12-09 RX ADMIN — INSULIN ASPART 4 UNITS: 100 INJECTION, SOLUTION INTRAVENOUS; SUBCUTANEOUS at 06:12

## 2022-12-09 RX ADMIN — CALCIUM CARBONATE (ANTACID) CHEW TAB 500 MG 1000 MG: 500 CHEW TAB at 05:12

## 2022-12-09 RX ADMIN — INSULIN ASPART 6 UNITS: 100 INJECTION, SOLUTION INTRAVENOUS; SUBCUTANEOUS at 05:12

## 2022-12-09 RX ADMIN — MICAFUNGIN SODIUM 100 MG: 100 INJECTION, POWDER, LYOPHILIZED, FOR SOLUTION INTRAVENOUS at 09:12

## 2022-12-09 RX ADMIN — CEFEPIME 2 G: 2 INJECTION, POWDER, FOR SOLUTION INTRAVENOUS at 12:12

## 2022-12-09 RX ADMIN — CALCIUM CARBONATE (ANTACID) CHEW TAB 500 MG 1000 MG: 500 CHEW TAB at 09:12

## 2022-12-09 RX ADMIN — CALCIUM CARBONATE (ANTACID) CHEW TAB 500 MG 1000 MG: 500 CHEW TAB at 06:12

## 2022-12-09 RX ADMIN — PANTOPRAZOLE SODIUM 40 MG: 40 GRANULE, DELAYED RELEASE ORAL at 09:12

## 2022-12-09 RX ADMIN — INSULIN ASPART 1 UNITS: 100 INJECTION, SOLUTION INTRAVENOUS; SUBCUTANEOUS at 01:12

## 2022-12-09 RX ADMIN — ENOXAPARIN SODIUM 40 MG: 40 INJECTION SUBCUTANEOUS at 05:12

## 2022-12-09 RX ADMIN — CALCIUM CARBONATE (ANTACID) CHEW TAB 500 MG 1000 MG: 500 CHEW TAB at 01:12

## 2022-12-09 NOTE — ASSESSMENT & PLAN NOTE
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    Admitted for bacteremia and fungemia, port removed and ID recommending IV antibiotics at home. PICC placed 12/1. 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     ENT/HNS:  - Laryngectomy protocol   -Sign above bed + outside door stating patient is "neck breather" and "can not be intubated by mouth"   -Clean laryngectomy stoma as needed, or daily  - Keep tracheostomy in place     - OK to replace tracheostomy if dislodged, only in place for swelling/secretion assistance -- replaced 11/27  - Humidified O2 via trach collar  - CT with fluid collection, will continue packing changes      Neuro:   - Pain: Multimodality PRN regimen initiated     Cardiac:  - HDS  - H/O of paroxysmal a fib, will CTM      Pulmonary:   - PRN breathing treatments  - Humidified air per trach collar  - OK to remove tracheostomy for adequate suctioning     Renal:   - monitor Is and Os  - Cr stable     Infectious Disease:  - Leukocytosis and BC positive for pseudomonas -- now improved WBC  - ID consulted; appreciate recommendations   - TTE unremarkable    - Continue BID packing changes   - Antibiotics per ID team; to end 12/11; PICC line placed   Antibiotics (From admission, onward)    Start     Stop Route Frequency Ordered    12/07/22 1600  vancomycin 1,500 mg in dextrose 5 % 250 mL IVPB         -- IV Every 24 hours (non-standard times) 12/06/22 1740    12/06/22 1700  ceFEPIme (MAXIPIME) 2 g in dextrose 5 % in water (D5W) 5 % 50 mL IVPB (MB+)         -- IV Every 8 hours (non-standard times) 12/06/22 1044    11/24/22 0900  mupirocin 2 % ointment         11/29 0859 Nasl 2 times daily 11/24/22 0319           Hematology/Oncology:  - H/H stable  - Lovenox DVT prophylaxis      FEN/GI  - Transaminitis and increased alk phos   - U/S of abdomen + " liver dopppler     -- biliary sludge, otherwise unremarkable; doppler wnl   - Medicine consulted; appreciate recs     - CRP, ESR elevated     - Autoimmune workup negative      - Hepatitis screen negative   - slowly advance TFs    - Postoperative hypoparathyroidism   - Hypocalcemia on initial admission     - Continue tums QID, calcitriol   - Replace electrolytes as needed to keep K>4, Mg>2 --repleted K+ 11/27  - Bowel reg  - Protonix for GI prophylaxis, GERD  - STRICT nausea control ondansetron, phenergan        MSK  - Out of bed as tolerated  - STSG donor site healed     Dispo:  - Continue hospital stay, IV abx to end 12/11

## 2022-12-09 NOTE — RESPIRATORY THERAPY
RAPID RESPONSE RESPIRATORY THERAPY PROACTIVE NOTE           Time of visit: 938     Code Status: Full Code   : 1950  Bed: 1055/1055 A:   MRN: [id number]  Time spent at the bedside: < 15 min    SITUATION    Evaluated patient for: LDA Check     BACKGROUND    Patient has a past medical history of Hypothyroidism, PEG (percutaneous endoscopic gastrostomy) adjustment/replacement/removal, and Type 2 diabetes mellitus, without long-term current use of insulin.  Clinically Significant Surgical Hx: laryngectomy    24 Hours Vitals Range:  Temp:  [96.8 °F (36 °C)-97.8 °F (36.6 °C)]   Pulse:  [67-77]   Resp:  [17-20]   BP: (120-137)/(66-76)   SpO2:  [96 %-100 %]     Labs:    Recent Labs     22  0351 22  0444 22  0502    136 134*   K 3.4* 3.4* 3.9    106 106   CO2 21* 21* 19*   CREATININE 0.7 0.8 0.7   * 137* 232*        No results for input(s): PH, PCO2, PO2, HCO3, POCSATURATED, BE in the last 72 hours.    ASSESSMENT/INTERVENTIONS  Laryngectomy pt. Shiley size 6 cuffed trach in stoma. Pt on 5L 21% trach collar. All supplies in room. Extra trachs at bedside, cuffed sizes 4 and 6.      Last VS   Temp: 97.3 °F (36.3 °C) (1207)  Pulse: 71 (1207)  Resp: 17 (1207)  BP: 126/76 (1207)  SpO2: 99 % (1207)      Extra trachs at bedside: cuffed sizes 4 and 6.  Level of Consciousness: Level of Consciousness (AVPU): alert  Respiratory Effort: Respiratory Effort: Unlabored Expansion/Accessory Muscle Usage: Expansion/Accessory Muscles/Retractions: expansion symmetric  All Lung Field Breath Sounds: All Lung Fields Breath Sounds: Anterior:, Posterior:, Lateral:, diminished, coarse  O2 Device/Concentration: 5L 21% TC.  Surgical airway:  Cheli pt, 6 cuffed trach in stoma , cuffed  Ambu at bedside: Ambu bag with the patient?: Yes, Adult Ambu     Active Orders   Respiratory Care    Oxygen Continuous     Frequency: Continuous     Number of Occurrences: Until Specified     Order  Questions:      Device type: Low flow      Device: Trach Collar      FiO2%: 28      Titrate O2 per Oxygen Titration Protocol: Yes      To maintain SpO2 goal of: >= 90%      Notify MD of: Inability to achieve desired SpO2; Sudden change in patient status and requires 20% increase in FiO2; Patient requires >60% FiO2    Routine tracheostomy care     Frequency: Q12H     Number of Occurrences: Until Specified    Tracheostomy replacement Once     Frequency: Once     Number of Occurrences: 1 Occurrences     Order Comments: Patient's laryngectomy stoma trach tube (6-0 Shiley cuffed) replace with new trach of same size 11/27  Please place additional 6-0 Shiley cuffed trach at bedside, MD to replace prn.         RECOMMENDATIONS    We recommend: RRT Recs: Continue POC per primary team.      FOLLOW-UP    Please call back the Rapid Response RT, Tacho Frederick, RRT at x [phone] for any questions or concerns.

## 2022-12-09 NOTE — PROGRESS NOTES
"[facility] - [facility]  Otorhinolaryngology-Head & Neck Surgery  Progress Note    Subjective:     Interval History: No issues overnight. No complaints this morning     Objective:     Vital Signs (24h Range):  Temp:  [96.8 °F (36 °C)-98.1 °F (36.7 °C)] 97.1 °F (36.2 °C)  Pulse:  [63-76] 76  Resp:  [16-20] 18  SpO2:  [96 %-100 %] 96 %  BP: (103-137)/(59-76) 128/72     Physical Exam  NAD  Awake and alert  Mild facial edema   Trach within stoma, removed. Stoma appears healthy  Skin graft to midline neck overlying flap  Small area of dehiscence left laterally, near area of STSG, scant drainage   Left area with packing in place, removed and replaced.  Minimal secretions from wound.  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    Admitted for bacteremia and fungemia, port removed and ID recommending IV antibiotics at home. PICC placed 12/1. 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     ENT/HNS:  - Laryngectomy protocol   -Sign above bed + outside door stating patient is "neck breather" and "can not be intubated by mouth"   -Clean laryngectomy stoma as needed, or daily  - Keep tracheostomy in place     - OK to replace tracheostomy if dislodged, only in place for swelling/secretion assistance -- replaced 11/27  - Humidified O2 via trach collar  - CT with fluid collection, will continue packing changes      Neuro:   - Pain: Multimodality PRN regimen initiated     Cardiac:  - HDS  - H/O of paroxysmal a fib, will CTM      Pulmonary:   - PRN breathing treatments  - Humidified air per trach collar  - OK to remove tracheostomy for adequate suctioning     Renal:   - monitor Is and Os  - Cr stable     Infectious Disease:  - Leukocytosis and BC positive " for pseudomonas -- now improved WBC  - ID consulted; appreciate recommendations   - TTE unremarkable    - Continue BID packing changes   - Antibiotics per ID team; to end 12/11; PICC line placed   Antibiotics (From admission, onward)    Start     Stop Route Frequency Ordered    12/07/22 1600  vancomycin 1,500 mg in dextrose 5 % 250 mL IVPB         -- IV Every 24 hours (non-standard times) 12/06/22 1740    12/06/22 1700  ceFEPIme (MAXIPIME) 2 g in dextrose 5 % in water (D5W) 5 % 50 mL IVPB (MB+)         -- IV Every 8 hours (non-standard times) 12/06/22 1044    11/24/22 0900  mupirocin 2 % ointment         11/29 0859 Nasl 2 times daily 11/24/22 0319           Hematology/Oncology:  - H/H stable  - Lovenox DVT prophylaxis      FEN/GI  - Transaminitis and increased alk phos   - U/S of abdomen + liver dopppler     -- biliary sludge, otherwise unremarkable; doppler wnl   - Medicine consulted; appreciate recs     - CRP, ESR elevated     - Autoimmune workup negative      - Hepatitis screen negative   - slowly advance TFs    - Postoperative hypoparathyroidism   - Hypocalcemia on initial admission     - Continue tums QID, calcitriol   - Replace electrolytes as needed to keep K>4, Mg>2 --repleted K+ 11/27  - Bowel reg  - Protonix for GI prophylaxis, GERD  - STRICT nausea control ondansetron, phenergan        MSK  - Out of bed as tolerated  - STSG donor site healed     Dispo:  - Continue hospital stay, IV abx to end 12/11

## 2022-12-09 NOTE — PLAN OF CARE
POC reviewed with Pt. Pt utilized written communication.  AAOx4. VSS on 5L on 28%. Tolerating tube feed @50 mL/hr, denies n/v. Voiding per urinal, adequate UOP. X1 BM, passing gas. Up ambulating in room. Denies pain. Bed in lowest position, call light within reach. WCTM.

## 2022-12-09 NOTE — PLAN OF CARE
[address] [facility]  Initial Discharge Assessment       Primary Care Provider: Maico Patterson MD    Admission Diagnosis: Post-operative complication [T81.9XXA]    Admission Date: 11/23/2022  Expected Discharge Date: 12/12/2022    Discharge Barriers Identified: None    Payor: [employer] MANAGED MEDICARE / Plan: [employer] MEDICARE HMO / Product Type: Capitation /     Extended Emergency Contact Information  Primary Emergency Contact: Janel Batres  Mobile Phone: [phone]  Relation: Spouse  Preferred language: English   needed? No    Discharge Plan A: Home Health  Discharge Plan B: Home Health    No Pharmacies Listed    Initial Assessment (most recent)       Adult Discharge Assessment - 11/25/22 1139          Discharge Assessment    Assessment Type Discharge Planning Reassessment     Confirmed/corrected address, phone number and insurance Yes     Confirmed Demographics Correct on Facesheet     Source of Information family     If unable to respond/provide information was family/caregiver contacted? Yes     Contact Name/Number [phone]     When was your last doctors appointment? 10/11/22     Does patient/caregiver understand observation status Yes     Communicated CHIARA with patient/caregiver Yes     Reason For Admission Post op complications     People in Home spouse     Facility Arrived From: Home     Do you expect to return to your current living situation? Yes     Do you have help at home or someone to help you manage your care at home? Yes     Who are your caregiver(s) and their phone number(s)? [phone]     Prior to hospitilization cognitive status: Alert/Oriented     Current cognitive status: Alert/Oriented     Walking or Climbing Stairs none     Dressing/Bathing none     Home Accessibility wheelchair accessible     Home Layout Able to live on 1st floor     Equipment Currently Used at Home none     Readmission within 30 days? No     Patient currently being followed by outpatient case  management? No     Do you currently have service(s) that help you manage your care at home? No     Do you take prescription medications? Yes     Do you have prescription coverage? Yes     Coverage [employer] Manged medicare     Do you have any problems affording any of your prescribed medications? No     Is the patient taking medications as prescribed? yes     Who is going to help you get home at discharge? [phone]     How do you get to doctors appointments? car, drives self     Are you on dialysis? No     Do you take coumadin? No     Discharge Plan A Home with family     Discharge Plan B Home Health     DME Needed Upon Discharge  other (see comments)   Unknown at this time    Discharge Plan discussed with: Spouse/sig other     Name(s) and Number(s) [phone]     Discharge Barriers Identified None                          Pt not ready for discharge due to: Not medically ready  SW will remain available for families in [facility].  Currently pt has d/c plans in progress at this time.    [facility] following for d/c.    Pam Webber LCSW  Case Management/[facility]  [phone]

## 2022-12-09 NOTE — SUBJECTIVE & OBJECTIVE
Interval History: No issues overnight.     Medications:  Continuous Infusions:      Scheduled Meds:   calcitrioL  0.25 mcg Per G Tube Daily    calcium carbonate  1,000 mg Per G Tube 5x Daily    ceFEPime (MAXIPIME) IVPB  2 g Intravenous Q8H    enoxaparin  40 mg Subcutaneous Daily    micafungin (MYCAMINE) IVPB  100 mg Intravenous Q24H    pantoprazole  40 mg Per G Tube Daily    polyethylene glycol  17 g Per G Tube Daily    sodium chloride 0.9%  10 mL Intravenous Q6H    vancomycin (VANCOCIN) IVPB  1,500 mg Intravenous Q24H     PRN Meds:acetaminophen, dextrose 10%, dextrose 10%, glucagon (human recombinant), hydrALAZINE, insulin aspart U-100, ondansetron, oxyCODONE, promethazine (PHENERGAN) IVPB, senna-docusate 8.6-50 mg, sodium chloride 0.9%, Flushing PICC Protocol **AND** sodium chloride 0.9% **AND** sodium chloride 0.9%     Review of patient's allergies indicates:   Allergen Reactions    Lovastatin Itching     Objective:     Vital Signs (24h Range):  Temp:  [96.8 °F (36 °C)-98.1 °F (36.7 °C)] 97.1 °F (36.2 °C)  Pulse:  [63-76] 76  Resp:  [16-20] 18  SpO2:  [96 %-100 %] 96 %  BP: (103-137)/(59-76) 128/72       Lines/Drains/Airways       Peripherally Inserted Central Catheter Line  Duration             PICC Double Lumen 12/01/22 1017 right brachial 7 days              Drain  Duration                  Gastrostomy/Enterostomy LUQ -- days              Airway  Duration             Adult Surgical Airway Shiley Cuffed -- days         Laryngectomy (Do Not Remove) 11/09/22 Laryngectomy stoma 30 days              Peripheral Intravenous Line  Duration                  Peripheral IV - Single Lumen 11/28/22 1311 20 G;1 3/4 in Left;Anterior Forearm 10 days                    Physical Exam  NAD  Awake and alert  Mild facial edema   Trach within stoma, removed. Stoma appears healthy  Skin graft to midline neck overlying flap  Small area of dehiscence left laterally, near area of STSG, minimal drainage   Left area with packing in  place, removed and replaced.  Minimal secretions from wound.  Intraoral flap is soft without fluctuance, secretions thin, clear intraorally    Significant Labs:  CBC:   Recent Labs   Lab 12/09/22  0502   WBC 4.05   RBC 3.05*   HGB 9.2*   HCT 28.4*      MCV 93   MCH 30.2   MCHC 32.4       CMP:   Recent Labs   Lab 12/09/22  0502   *   CALCIUM 7.2*   ALBUMIN 2.3*   PROT 5.7*   *   K 3.9   CO2 19*      BUN 11   CREATININE 0.7   ALKPHOS 885*   ALT 65*   AST 35   BILITOT 1.2*            Significant Diagnostics:  None

## 2022-12-09 NOTE — PROGRESS NOTES
Pharmacokinetic Assessment Follow Up: IV Vancomycin    Vancomycin serum concentration assessment(s):    The trough level was drawn correctly and can be used to guide therapy at this time. The measurement is within the desired definitive target range of 15 to 20 mcg/mL.  - The trough level was drawn a little early at ~22.5 hours after previous dose and resulted at 16.1.     Vancomycin Regimen Plan:    Continue regimen to Vancomycin 1500 mg IV every 24 hours with next serum trough concentration measured at 1600 prior to 5th dose on 12/11.  - Mr. Batres's renal function appears stable and at baseline. Still making good UOP.   - Please draw random level sooner than scheduled trough if renal function changes significantly.    Drug levels (last 3 results):  Recent Labs   Lab Result Units 12/09/22  1539   Vancomycin-Trough ug/mL 16.1       Pharmacy will continue to follow and monitor vancomycin.    Please contact pharmacy at extension [phone] for questions regarding this assessment.    Thank you for the consult,   Leticia Benoit       Patient brief summary:  Cyrus Batres Jr. is a 72 y.o. male initiated on antimicrobial therapy with IV Vancomycin for treatment of bacteremia    Actual Body Weight:   62.1 kg    Renal Function:   Estimated Creatinine Clearance: 85 mL/min (based on SCr of 0.7 mg/dL).     Dialysis Method (if applicable):  N/A

## 2022-12-10 LAB
ALBUMIN SERPL BCP-MCNC: 2.4 G/DL (ref 3.5–5.2)
ALP SERPL-CCNC: 807 U/L (ref 55–135)
ALT SERPL W/O P-5'-P-CCNC: 56 U/L (ref 10–44)
ANION GAP SERPL CALC-SCNC: 7 MMOL/L (ref 8–16)
AST SERPL-CCNC: 27 U/L (ref 10–40)
BASOPHILS # BLD AUTO: 0.02 K/UL (ref 0–0.2)
BASOPHILS NFR BLD: 0.4 % (ref 0–1.9)
BILIRUB SERPL-MCNC: 1.2 MG/DL (ref 0.1–1)
BUN SERPL-MCNC: 14 MG/DL (ref 8–23)
CALCIUM SERPL-MCNC: 7.2 MG/DL (ref 8.7–10.5)
CHLORIDE SERPL-SCNC: 104 MMOL/L (ref 95–110)
CO2 SERPL-SCNC: 21 MMOL/L (ref 23–29)
CREAT SERPL-MCNC: 0.8 MG/DL (ref 0.5–1.4)
DIFFERENTIAL METHOD: ABNORMAL
EOSINOPHIL # BLD AUTO: 0.1 K/UL (ref 0–0.5)
EOSINOPHIL NFR BLD: 2.2 % (ref 0–8)
ERYTHROCYTE [DISTWIDTH] IN BLOOD BY AUTOMATED COUNT: 15.9 % (ref 11.5–14.5)
EST. GFR  (NO RACE VARIABLE): >60 ML/MIN/1.73 M^2
GLUCOSE SERPL-MCNC: 225 MG/DL (ref 70–110)
HCT VFR BLD AUTO: 27.8 % (ref 40–54)
HGB BLD-MCNC: 9 G/DL (ref 14–18)
IMM GRANULOCYTES # BLD AUTO: 0.1 K/UL (ref 0–0.04)
IMM GRANULOCYTES NFR BLD AUTO: 2.2 % (ref 0–0.5)
LYMPHOCYTES # BLD AUTO: 0.7 K/UL (ref 1–4.8)
LYMPHOCYTES NFR BLD: 14.7 % (ref 18–48)
MCH RBC QN AUTO: 30.3 PG (ref 27–31)
MCHC RBC AUTO-ENTMCNC: 32.4 G/DL (ref 32–36)
MCV RBC AUTO: 94 FL (ref 82–98)
MONOCYTES # BLD AUTO: 0.3 K/UL (ref 0.3–1)
MONOCYTES NFR BLD: 6.6 % (ref 4–15)
NEUTROPHILS # BLD AUTO: 3.4 K/UL (ref 1.8–7.7)
NEUTROPHILS NFR BLD: 73.9 % (ref 38–73)
NRBC BLD-RTO: 0 /100 WBC
PLATELET # BLD AUTO: 181 K/UL (ref 150–450)
PMV BLD AUTO: 10.7 FL (ref 9.2–12.9)
POCT GLUCOSE: 205 MG/DL (ref 70–110)
POCT GLUCOSE: 222 MG/DL (ref 70–110)
POCT GLUCOSE: 229 MG/DL (ref 70–110)
POCT GLUCOSE: 249 MG/DL (ref 70–110)
POCT GLUCOSE: 269 MG/DL (ref 70–110)
POCT GLUCOSE: 270 MG/DL (ref 70–110)
POTASSIUM SERPL-SCNC: 4 MMOL/L (ref 3.5–5.1)
PROT SERPL-MCNC: 5.8 G/DL (ref 6–8.4)
RBC # BLD AUTO: 2.97 M/UL (ref 4.6–6.2)
SODIUM SERPL-SCNC: 132 MMOL/L (ref 136–145)
WBC # BLD AUTO: 4.55 K/UL (ref 3.9–12.7)

## 2022-12-10 PROCEDURE — 25000003 PHARM REV CODE 250

## 2022-12-10 PROCEDURE — 25000003 PHARM REV CODE 250: Performed by: OTOLARYNGOLOGY

## 2022-12-10 PROCEDURE — C1751 CATH, INF, PER/CENT/MIDLINE: HCPCS

## 2022-12-10 PROCEDURE — 20600001 HC STEP DOWN PRIVATE ROOM

## 2022-12-10 PROCEDURE — A4216 STERILE WATER/SALINE, 10 ML: HCPCS | Performed by: OTOLARYNGOLOGY

## 2022-12-10 PROCEDURE — 63600175 PHARM REV CODE 636 W HCPCS: Performed by: OTOLARYNGOLOGY

## 2022-12-10 PROCEDURE — 99900035 HC TECH TIME PER 15 MIN (STAT)

## 2022-12-10 PROCEDURE — 63600175 PHARM REV CODE 636 W HCPCS: Mod: JG | Performed by: STUDENT IN AN ORGANIZED HEALTH CARE EDUCATION/TRAINING PROGRAM

## 2022-12-10 PROCEDURE — 80053 COMPREHEN METABOLIC PANEL: CPT | Performed by: STUDENT IN AN ORGANIZED HEALTH CARE EDUCATION/TRAINING PROGRAM

## 2022-12-10 PROCEDURE — 25000003 PHARM REV CODE 250: Performed by: STUDENT IN AN ORGANIZED HEALTH CARE EDUCATION/TRAINING PROGRAM

## 2022-12-10 PROCEDURE — 94761 N-INVAS EAR/PLS OXIMETRY MLT: CPT

## 2022-12-10 PROCEDURE — 63700000 PHARM REV CODE 250 ALT 637 W/O HCPCS: Performed by: STUDENT IN AN ORGANIZED HEALTH CARE EDUCATION/TRAINING PROGRAM

## 2022-12-10 PROCEDURE — 63600175 PHARM REV CODE 636 W HCPCS

## 2022-12-10 PROCEDURE — 85025 COMPLETE CBC W/AUTO DIFF WBC: CPT | Performed by: STUDENT IN AN ORGANIZED HEALTH CARE EDUCATION/TRAINING PROGRAM

## 2022-12-10 PROCEDURE — 27000221 HC OXYGEN, UP TO 24 HOURS

## 2022-12-10 RX ORDER — TALC
6 POWDER (GRAM) TOPICAL NIGHTLY PRN
Status: DISCONTINUED | OUTPATIENT
Start: 2022-12-10 | End: 2022-12-13 | Stop reason: HOSPADM

## 2022-12-10 RX ADMIN — CEFEPIME 2 G: 2 INJECTION, POWDER, FOR SOLUTION INTRAVENOUS at 09:12

## 2022-12-10 RX ADMIN — INSULIN ASPART 3 UNITS: 100 INJECTION, SOLUTION INTRAVENOUS; SUBCUTANEOUS at 01:12

## 2022-12-10 RX ADMIN — ENOXAPARIN SODIUM 40 MG: 40 INJECTION SUBCUTANEOUS at 04:12

## 2022-12-10 RX ADMIN — Medication 6 MG: at 10:12

## 2022-12-10 RX ADMIN — ONDANSETRON 4 MG: 2 INJECTION INTRAMUSCULAR; INTRAVENOUS at 09:12

## 2022-12-10 RX ADMIN — CEFEPIME 2 G: 2 INJECTION, POWDER, FOR SOLUTION INTRAVENOUS at 04:12

## 2022-12-10 RX ADMIN — INSULIN ASPART 6 UNITS: 100 INJECTION, SOLUTION INTRAVENOUS; SUBCUTANEOUS at 05:12

## 2022-12-10 RX ADMIN — INSULIN ASPART 4 UNITS: 100 INJECTION, SOLUTION INTRAVENOUS; SUBCUTANEOUS at 12:12

## 2022-12-10 RX ADMIN — Medication 10 ML: at 12:12

## 2022-12-10 RX ADMIN — Medication 10 ML: at 06:12

## 2022-12-10 RX ADMIN — POLYETHYLENE GLYCOL 3350 17 G: 17 POWDER, FOR SOLUTION ORAL at 09:12

## 2022-12-10 RX ADMIN — CALCITRIOL 0.25 MCG: 1 SOLUTION ORAL at 12:12

## 2022-12-10 RX ADMIN — CEFEPIME 2 G: 2 INJECTION, POWDER, FOR SOLUTION INTRAVENOUS at 01:12

## 2022-12-10 RX ADMIN — CALCIUM CARBONATE (ANTACID) CHEW TAB 500 MG 1000 MG: 500 CHEW TAB at 05:12

## 2022-12-10 RX ADMIN — VANCOMYCIN HYDROCHLORIDE 1500 MG: 1.5 INJECTION, POWDER, LYOPHILIZED, FOR SOLUTION INTRAVENOUS at 04:12

## 2022-12-10 RX ADMIN — INSULIN ASPART 2 UNITS: 100 INJECTION, SOLUTION INTRAVENOUS; SUBCUTANEOUS at 11:12

## 2022-12-10 RX ADMIN — CALCIUM CARBONATE (ANTACID) CHEW TAB 500 MG 1000 MG: 500 CHEW TAB at 02:12

## 2022-12-10 RX ADMIN — MICAFUNGIN SODIUM 100 MG: 100 INJECTION, POWDER, LYOPHILIZED, FOR SOLUTION INTRAVENOUS at 10:12

## 2022-12-10 RX ADMIN — INSULIN ASPART 4 UNITS: 100 INJECTION, SOLUTION INTRAVENOUS; SUBCUTANEOUS at 05:12

## 2022-12-10 RX ADMIN — PANTOPRAZOLE SODIUM 40 MG: 40 GRANULE, DELAYED RELEASE ORAL at 09:12

## 2022-12-10 RX ADMIN — CALCIUM CARBONATE (ANTACID) CHEW TAB 500 MG 1000 MG: 500 CHEW TAB at 10:12

## 2022-12-10 NOTE — PLAN OF CARE
Patient alert and oriented, continues to communicate via writing on tablet or nodding. Resting in bed at this time, call light and personal belongings within reach. Continues to tolerate tube feeding, no c/o pain or discomfort. On 5L O2 @28% trach mask, incisions clean/dry/intact.

## 2022-12-10 NOTE — RESPIRATORY THERAPY
RAPID RESPONSE RESPIRATORY THERAPY PROACTIVE NOTE           Time of visit: 927     Code Status: Full Code   : 1950  Bed: 1055/1055 A:   MRN: [id number]  Time spent at the bedside: < 15 min    SITUATION    Evaluated patient for: LDA Check     BACKGROUND    Patient has a past medical history of Hypothyroidism, PEG (percutaneous endoscopic gastrostomy) adjustment/replacement/removal, and Type 2 diabetes mellitus, without long-term current use of insulin.  Clinically Significant Surgical Hx: laryngectomy    24 Hours Vitals Range:  Temp:  [97 °F (36.1 °C)-97.5 °F (36.4 °C)]   Pulse:  [71-78]   Resp:  [16-18]   BP: (111-130)/(64-81)   SpO2:  [98 %-100 %]     Labs:    Recent Labs     22  0444 22  0502 12/10/22  0533    134* 132*   K 3.4* 3.9 4.0    106 104   CO2 21* 19* 21*   CREATININE 0.8 0.7 0.8   * 232* 225*        No results for input(s): PH, PCO2, PO2, HCO3, POCSATURATED, BE in the last 72 hours.    ASSESSMENT/INTERVENTIONS  Patient resting comfortably. No respiratory concerns at this time. Patient laryngectomy airway safety equipment bedside (6, 7, 8 ETT, pediatric mask, adult ambu bag, warning bracelet, warning sign above bed and door).      Last VS   Temp: 97.5 °F (36.4 °C) (12/10 0745)  Pulse: 71 (12/10 0745)  Resp: 18 (12/10 0745)  BP: 111/64 (12/10 0745)  SpO2: 99 % (12/10 0745)      Extra trachs at bedside: NA  Level of Consciousness: Level of Consciousness (AVPU): alert  Respiratory Effort: Respiratory Effort: Normal, Unlabored Expansion/Accessory Muscle Usage: Expansion/Accessory Muscles/Retractions: expansion symmetric, no retractions, no use of accessory muscles  All Lung Field Breath Sounds: All Lung Fields Breath Sounds: Anterior:, Lateral:, diminished  O2 Device/Concentration: 5L/21%  Surgical airway: Laryngectomy  Ambu at bedside: Ambu bag with the patient?: Yes, Adult Ambu     Active Orders   Respiratory Care    Oxygen Continuous     Frequency: Continuous      Number of Occurrences: Until Specified     Order Questions:      Device type: Low flow      Device: Trach Collar      FiO2%: 28      Titrate O2 per Oxygen Titration Protocol: Yes      To maintain SpO2 goal of: >= 90%      Notify MD of: Inability to achieve desired SpO2; Sudden change in patient status and requires 20% increase in FiO2; Patient requires >60% FiO2    Routine tracheostomy care     Frequency: Q12H     Number of Occurrences: Until Specified    Tracheostomy replacement Once     Frequency: Once     Number of Occurrences: 1 Occurrences     Order Comments: Patient's laryngectomy stoma trach tube (6-0 Shiley cuffed) replace with new trach of same size 11/27  Please place additional 6-0 Shiley cuffed trach at bedside, MD to replace prn.         RECOMMENDATIONS    We recommend: RRT Recs: Continue POC per primary team.      FOLLOW-UP    Please call back the Rapid Response RT, Marcie Oleary RRT at x [phone] for any questions or concerns.

## 2022-12-10 NOTE — PLAN OF CARE
Patient AAO x 3, mouth words and communicates needs through writing.  Patient denies pain. Ambulated to to the toilet with BM x 2.  Voids per toilet.  OOB TC for most of day.  Trach care per protocol.  IV antibiotics infusing and no adverse reaction noted, afebrile.  New orders for wound care consult and melatonin 6 mg ordered for bedtime insomnia.  Wife visited. POC reviewed with patient and wife.  Discharge in pending 12/22/22.     Problem: Adult Inpatient Plan of Care  Goal: Plan of Care Review  Outcome: Ongoing, Progressing  Goal: Patient-Specific Goal (Individualized)  Outcome: Ongoing, Progressing  Goal: Absence of Hospital-Acquired Illness or Injury  Outcome: Ongoing, Progressing  Goal: Optimal Comfort and Wellbeing  Outcome: Ongoing, Progressing  Goal: Readiness for Transition of Care  Outcome: Ongoing, Progressing     Problem: Diabetes Comorbidity  Goal: Blood Glucose Level Within Targeted Range  Outcome: Ongoing, Progressing     Problem: Infection  Goal: Absence of Infection Signs and Symptoms  Outcome: Ongoing, Progressing     Problem: Impaired Wound Healing  Goal: Optimal Wound Healing  Outcome: Ongoing, Progressing     Problem: Pain Acute  Goal: Acceptable Pain Control and Functional Ability  Outcome: Ongoing, Progressing

## 2022-12-10 NOTE — PLAN OF CARE
Pt a/ox4, remains comfortable in bed. On 5L @ 21% trach mask. Tolerating tube feeds @ 55cc/hr. BM 12/9. Denies pain, N/V. Ambulating with ax1. Bed in lowest position, call light within reach.

## 2022-12-11 LAB
ALBUMIN SERPL BCP-MCNC: 2.5 G/DL (ref 3.5–5.2)
ALP SERPL-CCNC: 742 U/L (ref 55–135)
ALT SERPL W/O P-5'-P-CCNC: 52 U/L (ref 10–44)
ANION GAP SERPL CALC-SCNC: 6 MMOL/L (ref 8–16)
AST SERPL-CCNC: 27 U/L (ref 10–40)
BASOPHILS # BLD AUTO: 0.03 K/UL (ref 0–0.2)
BASOPHILS NFR BLD: 0.8 % (ref 0–1.9)
BILIRUB SERPL-MCNC: 1.2 MG/DL (ref 0.1–1)
BUN SERPL-MCNC: 16 MG/DL (ref 8–23)
CALCIUM SERPL-MCNC: 7.6 MG/DL (ref 8.7–10.5)
CHLORIDE SERPL-SCNC: 105 MMOL/L (ref 95–110)
CO2 SERPL-SCNC: 22 MMOL/L (ref 23–29)
CREAT SERPL-MCNC: 0.8 MG/DL (ref 0.5–1.4)
DIFFERENTIAL METHOD: ABNORMAL
EOSINOPHIL # BLD AUTO: 0.1 K/UL (ref 0–0.5)
EOSINOPHIL NFR BLD: 2.7 % (ref 0–8)
ERYTHROCYTE [DISTWIDTH] IN BLOOD BY AUTOMATED COUNT: 15.7 % (ref 11.5–14.5)
EST. GFR  (NO RACE VARIABLE): >60 ML/MIN/1.73 M^2
GLUCOSE SERPL-MCNC: 187 MG/DL (ref 70–110)
GLUCOSE SERPL-MCNC: 259 MG/DL (ref 70–110)
HCT VFR BLD AUTO: 27.5 % (ref 40–54)
HGB BLD-MCNC: 8.9 G/DL (ref 14–18)
IMM GRANULOCYTES # BLD AUTO: 0.11 K/UL (ref 0–0.04)
IMM GRANULOCYTES NFR BLD AUTO: 2.9 % (ref 0–0.5)
LYMPHOCYTES # BLD AUTO: 0.6 K/UL (ref 1–4.8)
LYMPHOCYTES NFR BLD: 16 % (ref 18–48)
MCH RBC QN AUTO: 30.5 PG (ref 27–31)
MCHC RBC AUTO-ENTMCNC: 32.4 G/DL (ref 32–36)
MCV RBC AUTO: 94 FL (ref 82–98)
MONOCYTES # BLD AUTO: 0.3 K/UL (ref 0.3–1)
MONOCYTES NFR BLD: 7.2 % (ref 4–15)
NEUTROPHILS # BLD AUTO: 2.6 K/UL (ref 1.8–7.7)
NEUTROPHILS NFR BLD: 70.4 % (ref 38–73)
NRBC BLD-RTO: 0 /100 WBC
PLATELET # BLD AUTO: 169 K/UL (ref 150–450)
PMV BLD AUTO: 11 FL (ref 9.2–12.9)
POCT GLUCOSE: 187 MG/DL (ref 70–110)
POCT GLUCOSE: 203 MG/DL (ref 70–110)
POCT GLUCOSE: 241 MG/DL (ref 70–110)
POCT GLUCOSE: 271 MG/DL (ref 70–110)
POTASSIUM SERPL-SCNC: 4.2 MMOL/L (ref 3.5–5.1)
PROT SERPL-MCNC: 6 G/DL (ref 6–8.4)
RBC # BLD AUTO: 2.92 M/UL (ref 4.6–6.2)
SODIUM SERPL-SCNC: 133 MMOL/L (ref 136–145)
VANCOMYCIN TROUGH SERPL-MCNC: 17.8 UG/ML (ref 10–22)
WBC # BLD AUTO: 3.75 K/UL (ref 3.9–12.7)

## 2022-12-11 PROCEDURE — 25000003 PHARM REV CODE 250: Performed by: STUDENT IN AN ORGANIZED HEALTH CARE EDUCATION/TRAINING PROGRAM

## 2022-12-11 PROCEDURE — 63700000 PHARM REV CODE 250 ALT 637 W/O HCPCS: Performed by: STUDENT IN AN ORGANIZED HEALTH CARE EDUCATION/TRAINING PROGRAM

## 2022-12-11 PROCEDURE — 25000003 PHARM REV CODE 250: Performed by: OTOLARYNGOLOGY

## 2022-12-11 PROCEDURE — A4216 STERILE WATER/SALINE, 10 ML: HCPCS | Performed by: OTOLARYNGOLOGY

## 2022-12-11 PROCEDURE — 80053 COMPREHEN METABOLIC PANEL: CPT | Performed by: STUDENT IN AN ORGANIZED HEALTH CARE EDUCATION/TRAINING PROGRAM

## 2022-12-11 PROCEDURE — 63600175 PHARM REV CODE 636 W HCPCS

## 2022-12-11 PROCEDURE — 63600175 PHARM REV CODE 636 W HCPCS: Performed by: OTOLARYNGOLOGY

## 2022-12-11 PROCEDURE — 94761 N-INVAS EAR/PLS OXIMETRY MLT: CPT

## 2022-12-11 PROCEDURE — 63600175 PHARM REV CODE 636 W HCPCS: Performed by: PHYSICIAN ASSISTANT

## 2022-12-11 PROCEDURE — 85025 COMPLETE CBC W/AUTO DIFF WBC: CPT | Performed by: STUDENT IN AN ORGANIZED HEALTH CARE EDUCATION/TRAINING PROGRAM

## 2022-12-11 PROCEDURE — 99900035 HC TECH TIME PER 15 MIN (STAT)

## 2022-12-11 PROCEDURE — 25000003 PHARM REV CODE 250

## 2022-12-11 PROCEDURE — 99222 1ST HOSP IP/OBS MODERATE 55: CPT | Mod: ,,, | Performed by: PHYSICIAN ASSISTANT

## 2022-12-11 PROCEDURE — 99222 PR INITIAL HOSPITAL CARE,LEVL II: ICD-10-PCS | Mod: ,,, | Performed by: PHYSICIAN ASSISTANT

## 2022-12-11 PROCEDURE — 63600175 PHARM REV CODE 636 W HCPCS: Mod: JG | Performed by: STUDENT IN AN ORGANIZED HEALTH CARE EDUCATION/TRAINING PROGRAM

## 2022-12-11 PROCEDURE — 27000221 HC OXYGEN, UP TO 24 HOURS

## 2022-12-11 PROCEDURE — 20600001 HC STEP DOWN PRIVATE ROOM

## 2022-12-11 PROCEDURE — 80202 ASSAY OF VANCOMYCIN: CPT | Performed by: OTOLARYNGOLOGY

## 2022-12-11 RX ORDER — DEXTROSE MONOHYDRATE 100 MG/ML
INJECTION, SOLUTION INTRAVENOUS CONTINUOUS PRN
Status: DISCONTINUED | OUTPATIENT
Start: 2022-12-11 | End: 2022-12-12

## 2022-12-11 RX ORDER — GLUCAGON 1 MG
1 KIT INJECTION
Status: DISCONTINUED | OUTPATIENT
Start: 2022-12-11 | End: 2022-12-12

## 2022-12-11 RX ORDER — TRAZODONE HYDROCHLORIDE 50 MG/1
50 TABLET ORAL NIGHTLY PRN
Status: CANCELLED | OUTPATIENT
Start: 2022-12-11

## 2022-12-11 RX ORDER — INSULIN ASPART 100 [IU]/ML
2 INJECTION, SOLUTION INTRAVENOUS; SUBCUTANEOUS
Status: DISCONTINUED | OUTPATIENT
Start: 2022-12-11 | End: 2022-12-12

## 2022-12-11 RX ORDER — TRAZODONE HYDROCHLORIDE 50 MG/1
50 TABLET ORAL NIGHTLY PRN
Status: DISCONTINUED | OUTPATIENT
Start: 2022-12-11 | End: 2022-12-13 | Stop reason: HOSPADM

## 2022-12-11 RX ORDER — INSULIN ASPART 100 [IU]/ML
1-10 INJECTION, SOLUTION INTRAVENOUS; SUBCUTANEOUS EVERY 4 HOURS PRN
Status: DISCONTINUED | OUTPATIENT
Start: 2022-12-11 | End: 2022-12-12

## 2022-12-11 RX ADMIN — CALCIUM CARBONATE (ANTACID) CHEW TAB 500 MG 1000 MG: 500 CHEW TAB at 10:12

## 2022-12-11 RX ADMIN — INSULIN ASPART 2 UNITS: 100 INJECTION, SOLUTION INTRAVENOUS; SUBCUTANEOUS at 07:12

## 2022-12-11 RX ADMIN — CALCITRIOL 0.25 MCG: 1 SOLUTION ORAL at 10:12

## 2022-12-11 RX ADMIN — CALCIUM CARBONATE (ANTACID) CHEW TAB 500 MG 1000 MG: 500 CHEW TAB at 11:12

## 2022-12-11 RX ADMIN — INSULIN ASPART 2 UNITS: 100 INJECTION, SOLUTION INTRAVENOUS; SUBCUTANEOUS at 03:12

## 2022-12-11 RX ADMIN — Medication 10 ML: at 12:12

## 2022-12-11 RX ADMIN — MICAFUNGIN SODIUM 100 MG: 100 INJECTION, POWDER, LYOPHILIZED, FOR SOLUTION INTRAVENOUS at 11:12

## 2022-12-11 RX ADMIN — CEFEPIME 2 G: 2 INJECTION, POWDER, FOR SOLUTION INTRAVENOUS at 10:12

## 2022-12-11 RX ADMIN — CALCIUM CARBONATE (ANTACID) CHEW TAB 500 MG 1000 MG: 500 CHEW TAB at 05:12

## 2022-12-11 RX ADMIN — TRAZODONE HYDROCHLORIDE 50 MG: 50 TABLET ORAL at 11:12

## 2022-12-11 RX ADMIN — CEFEPIME 2 G: 2 INJECTION, POWDER, FOR SOLUTION INTRAVENOUS at 12:12

## 2022-12-11 RX ADMIN — Medication 10 ML: at 06:12

## 2022-12-11 RX ADMIN — Medication 10 ML: at 05:12

## 2022-12-11 RX ADMIN — INSULIN ASPART 6 UNITS: 100 INJECTION, SOLUTION INTRAVENOUS; SUBCUTANEOUS at 06:12

## 2022-12-11 RX ADMIN — CALCIUM CARBONATE (ANTACID) CHEW TAB 500 MG 1000 MG: 500 CHEW TAB at 01:12

## 2022-12-11 RX ADMIN — ENOXAPARIN SODIUM 40 MG: 40 INJECTION SUBCUTANEOUS at 05:12

## 2022-12-11 RX ADMIN — INSULIN ASPART 4 UNITS: 100 INJECTION, SOLUTION INTRAVENOUS; SUBCUTANEOUS at 03:12

## 2022-12-11 RX ADMIN — PANTOPRAZOLE SODIUM 40 MG: 40 GRANULE, DELAYED RELEASE ORAL at 09:12

## 2022-12-11 RX ADMIN — VANCOMYCIN HYDROCHLORIDE 1500 MG: 1.5 INJECTION, POWDER, LYOPHILIZED, FOR SOLUTION INTRAVENOUS at 05:12

## 2022-12-11 RX ADMIN — POLYETHYLENE GLYCOL 3350 17 G: 17 POWDER, FOR SOLUTION ORAL at 09:12

## 2022-12-11 RX ADMIN — CEFEPIME 2 G: 2 INJECTION, POWDER, FOR SOLUTION INTRAVENOUS at 05:12

## 2022-12-11 NOTE — PROGRESS NOTES
[facility] - [facility]  Otorhinolaryngology-Head & Neck Surgery  Progress Note    Subjective:     Post-Op Info:  * No surgery found *      Hospital Day: 19     Interval History: Packing with no drainage. Will dc packing changes. Completes antibiotic course today.     Medications:  Continuous Infusions:  Scheduled Meds:   calcitrioL  0.25 mcg Per G Tube Daily    calcium carbonate  1,000 mg Per G Tube 5x Daily    ceFEPime (MAXIPIME) IVPB  2 g Intravenous Q8H    enoxaparin  40 mg Subcutaneous Daily    micafungin (MYCAMINE) IVPB  100 mg Intravenous Q24H    pantoprazole  40 mg Per G Tube Daily    polyethylene glycol  17 g Per G Tube Daily    sodium chloride 0.9%  10 mL Intravenous Q6H    vancomycin (VANCOCIN) IVPB  1,500 mg Intravenous Q24H     PRN Meds:acetaminophen, dextrose 10%, dextrose 10%, glucagon (human recombinant), hydrALAZINE, insulin aspart U-100, melatonin, ondansetron, oxyCODONE, promethazine (PHENERGAN) IVPB, senna-docusate 8.6-50 mg, sodium chloride 0.9%, Flushing PICC Protocol **AND** sodium chloride 0.9% **AND** sodium chloride 0.9%     Review of patient's allergies indicates:   Allergen Reactions    Lovastatin Itching     Objective:     Vital Signs (24h Range):  Temp:  [96.4 °F (35.8 °C)-97.6 °F (36.4 °C)] 97.4 °F (36.3 °C)  Pulse:  [66-71] 68  Resp:  [16-18] 18  SpO2:  [98 %-100 %] 99 %  BP: (104-130)/(63-72) 104/63     Date 12/11/22 0700 - 12/12/22 0659   Shift 0417-3141 1304-8735 9463-8398 24 Hour Total   INTAKE   NG/   315   Shift Total(mL/kg) 315(5.1)   315(5.1)   OUTPUT   Urine(mL/kg/hr) 500   500   Shift Total(mL/kg) 500(8)   500(8)   Weight (kg) 62.1 62.1 62.1 62.1     Lines/Drains/Airways       Peripherally Inserted Central Catheter Line  Duration             PICC Double Lumen 12/01/22 1017 right brachial 10 days              Drain  Duration                  Gastrostomy/Enterostomy LUQ -- days              Airway  Duration             Adult Surgical Airway Shiley Cuffed -- days        "  Laryngectomy (Do Not Remove) 11/09/22 Laryngectomy stoma 32 days                    Physical Exam  NAD  Awake and alert  Mild facial edema, stable  Trach within stoma, removed. Stoma appears healthy  Skin graft to midline neck overlying flap  Small area of dehiscence left laterally, near area of STSG, no drainage  Left area with packing in place, removed entirely   Intraoral flap is soft without fluctuance, secretions thin, clear intraorally    Significant Labs:  CBC:   Recent Labs   Lab 12/11/22  0536   WBC 3.75*   RBC 2.92*   HGB 8.9*   HCT 27.5*      MCV 94   MCH 30.5   MCHC 32.4     CMP:   Recent Labs   Lab 12/11/22  0536   *   CALCIUM 7.6*   ALBUMIN 2.5*   PROT 6.0   *   K 4.2   CO2 22*      BUN 16   CREATININE 0.8   ALKPHOS 742*   ALT 52*   AST 27   BILITOT 1.2*       Significant Diagnostics:  None    Assessment/Plan:     Laryngeal cancer  72 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    Admitted for bacteremia and fungemia, port removed and ID recommending IV antibiotics at home. PICC placed 12/1. 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     ENT/HNS:  - Laryngectomy protocol   -Sign above bed + outside door stating patient is "neck breather" and "can not be intubated by mouth"   -Clean laryngectomy stoma as needed, or daily  - Keep tracheostomy in place     - OK to replace tracheostomy if dislodged, only in place for swelling/secretion assistance -- replaced 11/27  - Humidified O2 via trach collar  - CT with fluid collection, will continue packing changes though with minimal output/packing -- packing changes discontinued; CTM     Neuro:   - Pain: Multimodality PRN regimen initiated     Cardiac:  - HDS  - H/O of paroxysmal a fib, will CTM      Pulmonary:   - PRN breathing treatments  - Humidified air per trach " collar  - OK to remove tracheostomy for adequate suctioning     Renal:   - monitor Is and Os  - Cr stable     Infectious Disease:  - Leukocytosis and BC positive for pseudomonas -- now improved WBC  - ID consulted; appreciate recommendations   - TTE unremarkable    - Continue BID packing changes   - Antibiotics per ID team; to end 12/11; PICC line placed   Antibiotics (From admission, onward)    Start     Stop Route Frequency Ordered    12/07/22 1600  vancomycin 1,500 mg in dextrose 5 % 250 mL IVPB         -- IV Every 24 hours (non-standard times) 12/06/22 1740    12/06/22 1700  ceFEPIme (MAXIPIME) 2 g in dextrose 5 % in water (D5W) 5 % 50 mL IVPB (MB+)         -- IV Every 8 hours (non-standard times) 12/06/22 1044    11/24/22 0900  mupirocin 2 % ointment         11/29 0859 Nasl 2 times daily 11/24/22 0319           Hematology/Oncology:  - H/H stable  - Lovenox DVT prophylaxis      FEN/GI  - Elevated BG to 200s, consult to endocrine regarding home recs, appreciate assistance    --home TF at goal currently with tolerance; previously poorly tolerant of bolus feeds  - Transaminitis and increased alk phos   - U/S of abdomen + liver dopppler     -- biliary sludge, otherwise unremarkable; doppler wnl   - Medicine consulted; appreciate recs     - CRP, ESR elevated     - Autoimmune workup negative      - Hepatitis screen negative   - slowly advance TFs    - Postoperative hypoparathyroidism   - Hypocalcemia on initial admission     - Continue tums QID, calcitriol   - Replace electrolytes as needed to keep K>4, Mg>2 --repleted K+ 11/27  - Bowel reg  - Protonix for GI prophylaxis, GERD  - STRICT nausea control ondansetron, phenergan        MSK  - Out of bed as tolerated  - STSG donor site healed     Dispo:  - Continue hospital stay, IV abx to end 12/11; pending recs from endocrine for BG and cont clinical improvement will likely discharge early this week          Ronaldo Hyatt MD  Otorhinolaryngology-Head & Neck Surgery  [address] [facility]

## 2022-12-11 NOTE — RESPIRATORY THERAPY
RAPID RESPONSE RESPIRATORY THERAPY PROACTIVE NOTE           Time of visit: 1024     Code Status: Full Code   : 1950  Bed: 1055/1055 A:   MRN: [id number]  Time spent at the bedside: < 15 min    SITUATION    Evaluated patient for: LDA Check     BACKGROUND    Patient has a past medical history of Hypothyroidism, PEG (percutaneous endoscopic gastrostomy) adjustment/replacement/removal, and Type 2 diabetes mellitus, without long-term current use of insulin.  Clinically Significant Surgical Hx: tracheostomy  laryngectomy    24 Hours Vitals Range:  Temp:  [96.4 °F (35.8 °C)-98.7 °F (37.1 °C)]   Pulse:  [66-71]   Resp:  [16-18]   BP: (104-130)/(63-72)   SpO2:  [98 %-100 %]     Labs:    Recent Labs     22  0502 12/10/22  0533 22  0536   * 132* 133*   K 3.9 4.0 4.2    104 105   CO2 19* 21* 22*   CREATININE 0.7 0.8 0.8   * 225* 259*        No results for input(s): PH, PCO2, PO2, HCO3, POCSATURATED, BE in the last 72 hours.    ASSESSMENT/INTERVENTIONS  Pt resting comfortably with no concern for respiratory during visit.      Last VS   Temp: 98.7 °F (37.1 °C) (1100)  Pulse: 69 (1100)  Resp: 18 (1100)  BP: 121/69 (1100)  SpO2: 99 % (1100)      Extra trachs at bedside: 6.0 & 4.0 cuffed  Extra ETTs: 6, 7, 8  Level of Consciousness: Level of Consciousness (AVPU): alert  Respiratory Effort: Respiratory Effort: Normal, Unlabored Expansion/Accessory Muscle Usage: Expansion/Accessory Muscles/Retractions: (P) no use of accessory muscles  All Lung Field Breath Sounds: All Lung Fields Breath Sounds: Anterior:, Lateral:, coarse, diminished  EVER Breath Sounds: (P) diminished  RUL Breath Sounds: (P) coarse  RML Breath Sounds: (P) coarse  RLL Breath Sounds: (P) coarse  O2 Device/Concentration: trach collar 5L/21%  Surgical airway: Yes, Type: Shiley Size: 6, cuffed in Laryngectomy stoma  Ambu at bedside: Ambu bag with the patient?: Yes, Adult Ambu     Active Orders   Respiratory  Care    Oxygen Continuous     Frequency: Continuous     Number of Occurrences: Until Specified     Order Questions:      Device type: Low flow      Device: Trach Collar      FiO2%: 28      Titrate O2 per Oxygen Titration Protocol: Yes      To maintain SpO2 goal of: >= 90%      Notify MD of: Inability to achieve desired SpO2; Sudden change in patient status and requires 20% increase in FiO2; Patient requires >60% FiO2    Routine tracheostomy care     Frequency: Q12H     Number of Occurrences: Until Specified    Tracheostomy replacement Once     Frequency: Once     Number of Occurrences: 1 Occurrences     Order Comments: Patient's laryngectomy stoma trach tube (6-0 Shiley cuffed) replace with new trach of same size 11/27  Please place additional 6-0 Shiley cuffed trach at bedside, MD to replace prn.         RECOMMENDATIONS    We recommend: RRT Recs: Continue POC per primary team.      FOLLOW-UP    Please call back the Rapid Response RT, Leo Walters, RRT at x [phone] for any questions or concerns.

## 2022-12-11 NOTE — PLAN OF CARE
Problem: Adult Inpatient Plan of Care  Goal: Plan of Care Review  Outcome: Ongoing, Progressing  Goal: Patient-Specific Goal (Individualized)  Outcome: Ongoing, Progressing  Goal: Absence of Hospital-Acquired Illness or Injury  Outcome: Ongoing, Progressing  Goal: Optimal Comfort and Wellbeing  Outcome: Ongoing, Progressing  Goal: Readiness for Transition of Care  Outcome: Ongoing, Progressing     Problem: Diabetes Comorbidity  Goal: Blood Glucose Level Within Targeted Range  Outcome: Ongoing, Progressing     Problem: Infection  Goal: Absence of Infection Signs and Symptoms  Outcome: Ongoing, Progressing     Problem: Pain Acute  Goal: Acceptable Pain Control and Functional Ability  Outcome: Ongoing, Progressing     Problem: Diabetes Comorbidity  Goal: Blood Glucose Level Within Targeted Range  Outcome: Ongoing, Progressing     Patient AAOx3, calm  and pleasant.  Denies pain. Trach and g tube  care per per protocol.   Encouraged to ambulate in [place] with assistance x 1.  Patient ambulated in [place] with assist x 1, no observed SOB or fatigue noted.  Patient sat in w/c and observed the sunset.  Endocrinology consulted for BG monitoring.  CBG increased to every 4  hours, with scheduled as part of 2 units and SSI coverage and patient requires coverage at 151.  CB and coverage received.  Vanc trough collected and results: 17.8 Patient reported that he takes trazodone at home for sleep.  Melatonin 6 mg ordered 22.  Discharge date is pending 22.             back

## 2022-12-11 NOTE — SUBJECTIVE & OBJECTIVE
Interval History: Poor sleep overnight, complaining of increased discomfort at sacrum with no other complaints.     Medications:  Continuous Infusions:  Scheduled Meds:   calcitrioL  0.25 mcg Per G Tube Daily    calcium carbonate  1,000 mg Per G Tube 5x Daily    ceFEPime (MAXIPIME) IVPB  2 g Intravenous Q8H    enoxaparin  40 mg Subcutaneous Daily    micafungin (MYCAMINE) IVPB  100 mg Intravenous Q24H    pantoprazole  40 mg Per G Tube Daily    polyethylene glycol  17 g Per G Tube Daily    sodium chloride 0.9%  10 mL Intravenous Q6H    vancomycin (VANCOCIN) IVPB  1,500 mg Intravenous Q24H     PRN Meds:acetaminophen, dextrose 10%, dextrose 10%, glucagon (human recombinant), hydrALAZINE, insulin aspart U-100, melatonin, ondansetron, oxyCODONE, promethazine (PHENERGAN) IVPB, senna-docusate 8.6-50 mg, sodium chloride 0.9%, Flushing PICC Protocol **AND** sodium chloride 0.9% **AND** sodium chloride 0.9%     Review of patient's allergies indicates:   Allergen Reactions    Lovastatin Itching     Objective:     Vital Signs (24h Range):  Temp:  [96.4 °F (35.8 °C)-98.5 °F (36.9 °C)] 97.6 °F (36.4 °C)  Pulse:  [68-78] 69  Resp:  [16-18] 18  SpO2:  [99 %-100 %] 100 %  BP: (111-130)/(63-73) 115/66     Date 12/10/22 0700 - 12/11/22 0659   Shift 7092-4443 6811-8305 5087-0802 24 Hour Total   INTAKE   NG/ 100  200   Shift Total(mL/kg) 100(1.6) 100(1.6)  200(3.2)   OUTPUT   Urine(mL/kg/hr)  225  225   Drains  258  258   Shift Total(mL/kg)  483(7.8)  483(7.8)   Weight (kg) 62.1 62.1 62.1 62.1     Lines/Drains/Airways       Peripherally Inserted Central Catheter Line  Duration             PICC Double Lumen 12/01/22 1017 right brachial 9 days              Drain  Duration                  Gastrostomy/Enterostomy LUQ -- days              Airway  Duration             Adult Surgical Airway Shiley Cuffed -- days         Laryngectomy (Do Not Remove) 11/09/22 Laryngectomy stoma 31 days              Peripheral Intravenous Line  Duration                   Peripheral IV - Single Lumen 11/28/22 1311 20 G;1 3/4 in Left;Anterior Forearm 12 days                    Physical Exam  NAD  Awake and alert  Mild facial edema, stable  Trach within stoma, removed. Stoma appears healthy  Skin graft to midline neck overlying flap  Small area of dehiscence left laterally, near area of STSG, scant minimal drops of expresseddrainage  Left area with packing in place, removed and replaced.  Minimal secretions from wound.  Intraoral flap is soft without fluctuance, secretions thin, clear intraorally    Significant Labs:  CBC:   Recent Labs   Lab 12/10/22  0533   WBC 4.55   RBC 2.97*   HGB 9.0*   HCT 27.8*      MCV 94   MCH 30.3   MCHC 32.4     CMP:   Recent Labs   Lab 12/10/22  0533   *   CALCIUM 7.2*   ALBUMIN 2.4*   PROT 5.8*   *   K 4.0   CO2 21*      BUN 14   CREATININE 0.8   ALKPHOS 807*   ALT 56*   AST 27   BILITOT 1.2*       Significant Diagnostics:  None

## 2022-12-11 NOTE — PLAN OF CARE
Pt AAOx4, VSS. Cheli airway, 5L trach collar in place. Bilateral neck incisions. G tube with TF @ 55 (goal met and tolerated well). Pt communicates via writing or gesturing. Denies pain and nausea. R PICC. Safety precautions in place.     Pt still c/o insomnia, may need melatonin dose increased for tonight.

## 2022-12-11 NOTE — PROGRESS NOTES
[facility] - [facility]  Otorhinolaryngology-Head & Neck Surgery  Progress Note    Subjective:     Post-Op Info:  * No surgery found *      Hospital Day: 18     Interval History: Poor sleep overnight, complaining of increased discomfort at sacrum with no other complaints.     Medications:  Continuous Infusions:  Scheduled Meds:   calcitrioL  0.25 mcg Per G Tube Daily    calcium carbonate  1,000 mg Per G Tube 5x Daily    ceFEPime (MAXIPIME) IVPB  2 g Intravenous Q8H    enoxaparin  40 mg Subcutaneous Daily    micafungin (MYCAMINE) IVPB  100 mg Intravenous Q24H    pantoprazole  40 mg Per G Tube Daily    polyethylene glycol  17 g Per G Tube Daily    sodium chloride 0.9%  10 mL Intravenous Q6H    vancomycin (VANCOCIN) IVPB  1,500 mg Intravenous Q24H     PRN Meds:acetaminophen, dextrose 10%, dextrose 10%, glucagon (human recombinant), hydrALAZINE, insulin aspart U-100, melatonin, ondansetron, oxyCODONE, promethazine (PHENERGAN) IVPB, senna-docusate 8.6-50 mg, sodium chloride 0.9%, Flushing PICC Protocol **AND** sodium chloride 0.9% **AND** sodium chloride 0.9%     Review of patient's allergies indicates:   Allergen Reactions    Lovastatin Itching     Objective:     Vital Signs (24h Range):  Temp:  [96.4 °F (35.8 °C)-98.5 °F (36.9 °C)] 97.6 °F (36.4 °C)  Pulse:  [68-78] 69  Resp:  [16-18] 18  SpO2:  [99 %-100 %] 100 %  BP: (111-130)/(63-73) 115/66     Date 12/10/22 0700 - 12/11/22 0659   Shift 0612-0796 0103-2457 3226-1635 24 Hour Total   INTAKE   NG/ 100  200   Shift Total(mL/kg) 100(1.6) 100(1.6)  200(3.2)   OUTPUT   Urine(mL/kg/hr)  225  225   Drains  258  258   Shift Total(mL/kg)  483(7.8)  483(7.8)   Weight (kg) 62.1 62.1 62.1 62.1     Lines/Drains/Airways       Peripherally Inserted Central Catheter Line  Duration             PICC Double Lumen 12/01/22 1017 right brachial 9 days              Drain  Duration                  Gastrostomy/Enterostomy LUQ -- days              Airway  Duration             Adult  "Surgical Airway Shiley Cuffed -- days         Laryngectomy (Do Not Remove) 11/09/22 Laryngectomy stoma 31 days              Peripheral Intravenous Line  Duration                  Peripheral IV - Single Lumen 11/28/22 1311 20 G;1 3/4 in Left;Anterior Forearm 12 days                    Physical Exam  NAD  Awake and alert  Mild facial edema, stable  Trach within stoma, removed. Stoma appears healthy  Skin graft to midline neck overlying flap  Small area of dehiscence left laterally, near area of STSG, scant minimal drops of expresseddrainage  Left area with packing in place, removed and replaced.  Minimal secretions from wound.  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    Admitted for bacteremia and fungemia, port removed and ID recommending IV antibiotics at home. PICC placed 12/1. 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     ENT/HNS:  - Laryngectomy protocol   -Sign above bed + outside door stating patient is "neck breather" and "can not be intubated by mouth"   -Clean laryngectomy stoma as needed, or daily  - Keep tracheostomy in place     - OK to replace tracheostomy if dislodged, only in place for swelling/secretion assistance -- replaced 11/27  - Humidified O2 via trach collar  - CT with fluid collection, will continue packing changes " though with minimal output/packing now     Neuro:   - Pain: Multimodality PRN regimen initiated     Cardiac:  - HDS  - H/O of paroxysmal a fib, will CTM      Pulmonary:   - PRN breathing treatments  - Humidified air per trach collar  - OK to remove tracheostomy for adequate suctioning     Renal:   - monitor Is and Os  - Cr stable     Infectious Disease:  - Leukocytosis and BC positive for pseudomonas -- now improved WBC  - ID consulted; appreciate recommendations   - TTE unremarkable    - Continue BID packing changes   - Antibiotics per ID team; to end 12/11; PICC line placed   Antibiotics (From admission, onward)    Start     Stop Route Frequency Ordered    12/07/22 1600  vancomycin 1,500 mg in dextrose 5 % 250 mL IVPB         -- IV Every 24 hours (non-standard times) 12/06/22 1740    12/06/22 1700  ceFEPIme (MAXIPIME) 2 g in dextrose 5 % in water (D5W) 5 % 50 mL IVPB (MB+)         -- IV Every 8 hours (non-standard times) 12/06/22 1044    11/24/22 0900  mupirocin 2 % ointment         11/29 0859 Nasl 2 times daily 11/24/22 0319           Hematology/Oncology:  - H/H stable  - Lovenox DVT prophylaxis      FEN/GI  - Transaminitis and increased alk phos   - U/S of abdomen + liver dopppler     -- biliary sludge, otherwise unremarkable; doppler wnl   - Medicine consulted; appreciate recs     - CRP, ESR elevated     - Autoimmune workup negative      - Hepatitis screen negative   - slowly advance TFs    - Postoperative hypoparathyroidism   - Hypocalcemia on initial admission     - Continue tums QID, calcitriol   - Replace electrolytes as needed to keep K>4, Mg>2 --repleted K+ 11/27  - Bowel reg  - Protonix for GI prophylaxis, GERD  - STRICT nausea control ondansetron, phenergan        MSK  - Out of bed as tolerated  - STSG donor site healed     Dispo:  - Continue hospital stay, IV abx to end 12/11          Ronaldo Hyatt MD  Otorhinolaryngology-Head & Neck Surgery  [facility]

## 2022-12-11 NOTE — ASSESSMENT & PLAN NOTE
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    Admitted for bacteremia and fungemia, port removed and ID recommending IV antibiotics at home. PICC placed 12/1. 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     ENT/HNS:  - Laryngectomy protocol   -Sign above bed + outside door stating patient is "neck breather" and "can not be intubated by mouth"   -Clean laryngectomy stoma as needed, or daily  - Keep tracheostomy in place     - OK to replace tracheostomy if dislodged, only in place for swelling/secretion assistance -- replaced 11/27  - Humidified O2 via trach collar  - CT with fluid collection, will continue packing changes though with minimal output/packing now     Neuro:   - Pain: Multimodality PRN regimen initiated     Cardiac:  - HDS  - H/O of paroxysmal a fib, will CTM      Pulmonary:   - PRN breathing treatments  - Humidified air per trach collar  - OK to remove tracheostomy for adequate suctioning     Renal:   - monitor Is and Os  - Cr stable     Infectious Disease:  - Leukocytosis and BC positive for pseudomonas -- now improved WBC  - ID consulted; appreciate recommendations   - TTE unremarkable    - Continue BID packing changes   - Antibiotics per ID team; to end 12/11; PICC line placed   Antibiotics (From admission, onward)    Start     Stop Route Frequency Ordered    12/07/22 1600  vancomycin 1,500 mg in dextrose 5 % 250 mL IVPB         -- IV Every 24 hours (non-standard times) 12/06/22 1740    12/06/22 1700  ceFEPIme (MAXIPIME) 2 g in dextrose 5 % in water (D5W) 5 % 50 mL IVPB (MB+)         -- IV Every 8 hours (non-standard times) 12/06/22 1044    11/24/22 0900  mupirocin 2 % ointment         11/29 0859 Nasl 2 times daily 11/24/22 0319           Hematology/Oncology:  - H/H stable  - Lovenox DVT prophylaxis      FEN/GI  - Transaminitis and " increased alk phos   - U/S of abdomen + liver dopppler     -- biliary sludge, otherwise unremarkable; doppler wnl   - Medicine consulted; appreciate recs     - CRP, ESR elevated     - Autoimmune workup negative      - Hepatitis screen negative   - slowly advance TFs    - Postoperative hypoparathyroidism   - Hypocalcemia on initial admission     - Continue tums QID, calcitriol   - Replace electrolytes as needed to keep K>4, Mg>2 --repleted K+ 11/27  - Bowel reg  - Protonix for GI prophylaxis, GERD  - STRICT nausea control ondansetron, phenergan        MSK  - Out of bed as tolerated  - STSG donor site healed     Dispo:  - Continue hospital stay, IV abx to end 12/11

## 2022-12-11 NOTE — ASSESSMENT & PLAN NOTE
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    Admitted for bacteremia and fungemia, port removed and ID recommending IV antibiotics at home. PICC placed 12/1. 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     ENT/HNS:  - Laryngectomy protocol   -Sign above bed + outside door stating patient is "neck breather" and "can not be intubated by mouth"   -Clean laryngectomy stoma as needed, or daily  - Keep tracheostomy in place     - OK to replace tracheostomy if dislodged, only in place for swelling/secretion assistance -- replaced 11/27  - Humidified O2 via trach collar  - CT with fluid collection, will continue packing changes though with minimal output/packing -- packing changes discontinued; CTM     Neuro:   - Pain: Multimodality PRN regimen initiated     Cardiac:  - HDS  - H/O of paroxysmal a fib, will CTM      Pulmonary:   - PRN breathing treatments  - Humidified air per trach collar  - OK to remove tracheostomy for adequate suctioning     Renal:   - monitor Is and Os  - Cr stable     Infectious Disease:  - Leukocytosis and BC positive for pseudomonas -- now improved WBC  - ID consulted; appreciate recommendations   - TTE unremarkable    - Continue BID packing changes   - Antibiotics per ID team; to end 12/11; PICC line placed   Antibiotics (From admission, onward)    Start     Stop Route Frequency Ordered    12/07/22 1600  vancomycin 1,500 mg in dextrose 5 % 250 mL IVPB         -- IV Every 24 hours (non-standard times) 12/06/22 1740    12/06/22 1700  ceFEPIme (MAXIPIME) 2 g in dextrose 5 % in water (D5W) 5 % 50 mL IVPB (MB+)         -- IV Every 8 hours (non-standard times) 12/06/22 1044    11/24/22 0900  mupirocin 2 % ointment         11/29 0859 Nasl 2 times daily 11/24/22 0319           Hematology/Oncology:  - H/H stable  - Lovenox DVT prophylaxis "      FEN/GI  - Elevated BG to 200s, consult to endocrine regarding home recs, appreciate assistance    --home TF at goal currently with tolerance; previously poorly tolerant of bolus feeds  - Transaminitis and increased alk phos   - U/S of abdomen + liver dopppler     -- biliary sludge, otherwise unremarkable; doppler wnl   - Medicine consulted; appreciate recs     - CRP, ESR elevated     - Autoimmune workup negative      - Hepatitis screen negative   - slowly advance TFs    - Postoperative hypoparathyroidism   - Hypocalcemia on initial admission     - Continue tums QID, calcitriol   - Replace electrolytes as needed to keep K>4, Mg>2 --repleted K+ 11/27  - Bowel reg  - Protonix for GI prophylaxis, GERD  - STRICT nausea control ondansetron, phenergan        MSK  - Out of bed as tolerated  - STSG donor site healed     Dispo:  - Continue hospital stay, IV abx to end 12/11; pending recs from endocrine for BG and cont clinical improvement will likely discharge early this week

## 2022-12-11 NOTE — HPI
Reason for Consult: Management of T2DM, Hyperglycemia       Diabetes diagnosis year: 2010     Home Diabetes Medications:  Ozempic 0.25 mg weekly, Metformin unsure dose     How often checking glucose at home?  Once daily in morning  BG readings on regimen: low 100s  Hypoglycemia on the regimen?  No  Missed doses on regimen?  n/a     Diabetes Complications include:     None      Complicating diabetes co morbidities:   pAfib, HTN, active cancer         HPI:   Patient is a 71 y.o. male with hypertension, hypothyroidism, diabetes type 2, GERD , laryngeal cancer and malignant neoplasm of base of tongue who underwent a total glossectomy pharyngectomy with lateral thigh reconstruction and skin graft on 11/09/2022 at [facility] [place]  He  presented to the ED on account of shortness of breath and heavy mucus production.  Admitted for bacteremia and fungemia, port removed and ID recommending IV antibiotics at home. PICC placed 12/1. 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  Endocrine consulted for bg management.

## 2022-12-11 NOTE — SUBJECTIVE & OBJECTIVE
Interval HPI:   Overnight events: No acute events overnight. Patient in room 1055/1055 A. Blood glucose variable. BG above goal on current insulin regimen (SSI ). Steroid use- None .      Renal function- Normal   Vasopressors-  None     No diet orders on file     Eating:   <25%  Nausea: No  Hypoglycemia and intervention: No  Fever: No  TPN and/or TF: Yes  If yes, type of TF/TPN and rate: 55 mL/hr    PMH, PSH, FH, SH updated and reviewed       Review of Systems   Constitutional:  Negative for chills and fever.   Respiratory:  Negative for cough and shortness of breath.    Cardiovascular:  Negative for chest pain.   Gastrointestinal:  Negative for nausea and vomiting.   All other systems reviewed and are negative.    Current Medications and/or Treatments Impacting Glycemic Control  Immunotherapy:    Immunosuppressants       None          Steroids:   Hormones (From admission, onward)      Start     Stop Route Frequency Ordered    12/10/22 1316  melatonin tablet 6 mg         -- Oral Nightly PRN 12/10/22 1216          Pressors:    Autonomic Drugs (From admission, onward)      None          Hyperglycemia/Diabetes Medications:   Antihyperglycemics (From admission, onward)      Start     Stop Route Frequency Ordered    11/1951  insulin aspart U-100 pen 1-10 Units         -- SubQ Every 6 hours PRN 11/23/22 1851             PHYSICAL EXAMINATION:  Vitals:    12/11/22 0809   BP:    Pulse: 68   Resp: 18   Temp:      Body mass index is 22.11 kg/m².    Physical Exam  Constitutional:       General: He is not in acute distress.     Appearance: Normal appearance. He is not ill-appearing.   HENT:      Head: Normocephalic and atraumatic.      Right Ear: External ear normal.      Left Ear: External ear normal.      Nose: Nose normal.   Cardiovascular:      Rate and Rhythm: Normal rate and regular rhythm.      Heart sounds: No murmur heard.  Pulmonary:      Effort: Pulmonary effort is normal. No respiratory distress.      Comments:  trach  Abdominal:      General: Bowel sounds are normal. There is no distension.      Tenderness: There is no abdominal tenderness.      Comments: Peg in place   Musculoskeletal:         General: No swelling.      Right lower leg: No edema.      Left lower leg: No edema.   Skin:     Findings: No erythema.   Neurological:      Mental Status: He is alert.

## 2022-12-11 NOTE — ASSESSMENT & PLAN NOTE
BG goal: 140-180     Sugars elevated in 200s.  Pt on TF.  W/ plans to go home on TF possibly tomorrow.  Pt reports previously did feeds 9a-9p at home.  He is against going home on any type of scheduled insulin, but will consider SSI.  Will adjust insulin here to q4hr to cover TF and start on 2 units q4.     - Start Novolog 2 units q4 hr while pt on TF. Please hold if TF held or if BG under 100.  - Adjust Novolog Moderate dose correction with ISF 25 starting at 150 to be done q4h  - POCT Glucose every 4 hours  - Hypoglycemia protocol in place      ** Please notify Endocrine for any change and/or advance in diet**  ** Please call Endocrine for any BG related issues **     Discharge Planning:   TBD. Please notify endocrinology prior to discharge.   Anticipate pt will go home on previous home regimen of Ozempic and Metformin with addition of sliding scale insulin q4 as needed.  Will need close f/u w/ home provider or in discharge clinic.

## 2022-12-12 ENCOUNTER — PATIENT MESSAGE (OUTPATIENT)
Dept: ENDOCRINOLOGY | Facility: HOSPITAL | Age: 72
End: 2022-12-12
Payer: MEDICARE

## 2022-12-12 ENCOUNTER — PATIENT MESSAGE (OUTPATIENT)
Dept: SURGICAL ONCOLOGY | Facility: CLINIC | Age: 72
End: 2022-12-12
Payer: MEDICARE

## 2022-12-12 LAB
ALBUMIN SERPL BCP-MCNC: 2.6 G/DL (ref 3.5–5.2)
ALP SERPL-CCNC: 745 U/L (ref 55–135)
ALT SERPL W/O P-5'-P-CCNC: 57 U/L (ref 10–44)
ANION GAP SERPL CALC-SCNC: 8 MMOL/L (ref 8–16)
AST SERPL-CCNC: 36 U/L (ref 10–40)
BASOPHILS # BLD AUTO: 0.04 K/UL (ref 0–0.2)
BASOPHILS NFR BLD: 0.9 % (ref 0–1.9)
BILIRUB SERPL-MCNC: 1.3 MG/DL (ref 0.1–1)
BUN SERPL-MCNC: 18 MG/DL (ref 8–23)
CALCIUM SERPL-MCNC: 8.2 MG/DL (ref 8.7–10.5)
CHLORIDE SERPL-SCNC: 104 MMOL/L (ref 95–110)
CO2 SERPL-SCNC: 21 MMOL/L (ref 23–29)
CREAT SERPL-MCNC: 0.8 MG/DL (ref 0.5–1.4)
DIFFERENTIAL METHOD: ABNORMAL
EOSINOPHIL # BLD AUTO: 0.1 K/UL (ref 0–0.5)
EOSINOPHIL NFR BLD: 2.5 % (ref 0–8)
ERYTHROCYTE [DISTWIDTH] IN BLOOD BY AUTOMATED COUNT: 15.9 % (ref 11.5–14.5)
EST. GFR  (NO RACE VARIABLE): >60 ML/MIN/1.73 M^2
GLUCOSE SERPL-MCNC: 162 MG/DL (ref 70–110)
GLUCOSE SERPL-MCNC: 228 MG/DL (ref 70–110)
HCT VFR BLD AUTO: 28.4 % (ref 40–54)
HGB BLD-MCNC: 9.1 G/DL (ref 14–18)
IMM GRANULOCYTES # BLD AUTO: 0.16 K/UL (ref 0–0.04)
IMM GRANULOCYTES NFR BLD AUTO: 3.7 % (ref 0–0.5)
LYMPHOCYTES # BLD AUTO: 0.8 K/UL (ref 1–4.8)
LYMPHOCYTES NFR BLD: 19 % (ref 18–48)
MCH RBC QN AUTO: 30.2 PG (ref 27–31)
MCHC RBC AUTO-ENTMCNC: 32 G/DL (ref 32–36)
MCV RBC AUTO: 94 FL (ref 82–98)
MONOCYTES # BLD AUTO: 0.3 K/UL (ref 0.3–1)
MONOCYTES NFR BLD: 6.9 % (ref 4–15)
NEUTROPHILS # BLD AUTO: 2.9 K/UL (ref 1.8–7.7)
NEUTROPHILS NFR BLD: 67 % (ref 38–73)
NRBC BLD-RTO: 0 /100 WBC
PLATELET # BLD AUTO: 185 K/UL (ref 150–450)
PMV BLD AUTO: 10.6 FL (ref 9.2–12.9)
POCT GLUCOSE: 131 MG/DL (ref 70–110)
POCT GLUCOSE: 179 MG/DL (ref 70–110)
POCT GLUCOSE: 209 MG/DL (ref 70–110)
POCT GLUCOSE: 214 MG/DL (ref 70–110)
POCT GLUCOSE: 214 MG/DL (ref 70–110)
POTASSIUM SERPL-SCNC: 4.2 MMOL/L (ref 3.5–5.1)
PROT SERPL-MCNC: 6.2 G/DL (ref 6–8.4)
RBC # BLD AUTO: 3.01 M/UL (ref 4.6–6.2)
SODIUM SERPL-SCNC: 133 MMOL/L (ref 136–145)
WBC # BLD AUTO: 4.32 K/UL (ref 3.9–12.7)

## 2022-12-12 PROCEDURE — 63600175 PHARM REV CODE 636 W HCPCS: Performed by: PHYSICIAN ASSISTANT

## 2022-12-12 PROCEDURE — 25000003 PHARM REV CODE 250: Performed by: STUDENT IN AN ORGANIZED HEALTH CARE EDUCATION/TRAINING PROGRAM

## 2022-12-12 PROCEDURE — 63600175 PHARM REV CODE 636 W HCPCS

## 2022-12-12 PROCEDURE — 63600175 PHARM REV CODE 636 W HCPCS: Performed by: OTOLARYNGOLOGY

## 2022-12-12 PROCEDURE — 20600001 HC STEP DOWN PRIVATE ROOM

## 2022-12-12 PROCEDURE — 94761 N-INVAS EAR/PLS OXIMETRY MLT: CPT

## 2022-12-12 PROCEDURE — 25000003 PHARM REV CODE 250: Performed by: OTOLARYNGOLOGY

## 2022-12-12 PROCEDURE — 63700000 PHARM REV CODE 250 ALT 637 W/O HCPCS: Performed by: STUDENT IN AN ORGANIZED HEALTH CARE EDUCATION/TRAINING PROGRAM

## 2022-12-12 PROCEDURE — 99232 PR SUBSEQUENT HOSPITAL CARE,LEVL II: ICD-10-PCS | Mod: ,,, | Performed by: PHYSICIAN ASSISTANT

## 2022-12-12 PROCEDURE — 85025 COMPLETE CBC W/AUTO DIFF WBC: CPT | Performed by: STUDENT IN AN ORGANIZED HEALTH CARE EDUCATION/TRAINING PROGRAM

## 2022-12-12 PROCEDURE — 99900035 HC TECH TIME PER 15 MIN (STAT)

## 2022-12-12 PROCEDURE — A4216 STERILE WATER/SALINE, 10 ML: HCPCS | Performed by: OTOLARYNGOLOGY

## 2022-12-12 PROCEDURE — 99900026 HC AIRWAY MAINTENANCE (STAT)

## 2022-12-12 PROCEDURE — 27000221 HC OXYGEN, UP TO 24 HOURS

## 2022-12-12 PROCEDURE — 80053 COMPREHEN METABOLIC PANEL: CPT | Performed by: STUDENT IN AN ORGANIZED HEALTH CARE EDUCATION/TRAINING PROGRAM

## 2022-12-12 PROCEDURE — 99232 SBSQ HOSP IP/OBS MODERATE 35: CPT | Mod: ,,, | Performed by: PHYSICIAN ASSISTANT

## 2022-12-12 RX ORDER — INSULIN ASPART 100 [IU]/ML
1-10 INJECTION, SOLUTION INTRAVENOUS; SUBCUTANEOUS
Status: DISCONTINUED | OUTPATIENT
Start: 2022-12-12 | End: 2022-12-13 | Stop reason: HOSPADM

## 2022-12-12 RX ORDER — GLUCAGON 1 MG
1 KIT INJECTION
Status: DISCONTINUED | OUTPATIENT
Start: 2022-12-12 | End: 2022-12-13 | Stop reason: HOSPADM

## 2022-12-12 RX ORDER — INSULIN ASPART 100 [IU]/ML
2 INJECTION, SOLUTION INTRAVENOUS; SUBCUTANEOUS
Status: DISCONTINUED | OUTPATIENT
Start: 2022-12-12 | End: 2022-12-13 | Stop reason: HOSPADM

## 2022-12-12 RX ADMIN — CEFEPIME 2 G: 2 INJECTION, POWDER, FOR SOLUTION INTRAVENOUS at 12:12

## 2022-12-12 RX ADMIN — PANTOPRAZOLE SODIUM 40 MG: 40 GRANULE, DELAYED RELEASE ORAL at 09:12

## 2022-12-12 RX ADMIN — INSULIN ASPART 2 UNITS: 100 INJECTION, SOLUTION INTRAVENOUS; SUBCUTANEOUS at 09:12

## 2022-12-12 RX ADMIN — INSULIN ASPART 4 UNITS: 100 INJECTION, SOLUTION INTRAVENOUS; SUBCUTANEOUS at 04:12

## 2022-12-12 RX ADMIN — CALCIUM CARBONATE (ANTACID) CHEW TAB 500 MG 1000 MG: 500 CHEW TAB at 09:12

## 2022-12-12 RX ADMIN — Medication 10 ML: at 12:12

## 2022-12-12 RX ADMIN — CALCITRIOL 0.25 MCG: 1 SOLUTION ORAL at 09:12

## 2022-12-12 RX ADMIN — CALCIUM CARBONATE (ANTACID) CHEW TAB 500 MG 1000 MG: 500 CHEW TAB at 05:12

## 2022-12-12 RX ADMIN — INSULIN ASPART 2 UNITS: 100 INJECTION, SOLUTION INTRAVENOUS; SUBCUTANEOUS at 12:12

## 2022-12-12 RX ADMIN — Medication 10 ML: at 05:12

## 2022-12-12 RX ADMIN — CALCIUM CARBONATE (ANTACID) CHEW TAB 500 MG 1000 MG: 500 CHEW TAB at 02:12

## 2022-12-12 RX ADMIN — CEFEPIME 2 G: 2 INJECTION, POWDER, FOR SOLUTION INTRAVENOUS at 09:12

## 2022-12-12 RX ADMIN — ENOXAPARIN SODIUM 40 MG: 40 INJECTION SUBCUTANEOUS at 04:12

## 2022-12-12 RX ADMIN — Medication 10 ML: at 07:12

## 2022-12-12 RX ADMIN — INSULIN ASPART 1 UNITS: 100 INJECTION, SOLUTION INTRAVENOUS; SUBCUTANEOUS at 12:12

## 2022-12-12 RX ADMIN — INSULIN ASPART 2 UNITS: 100 INJECTION, SOLUTION INTRAVENOUS; SUBCUTANEOUS at 04:12

## 2022-12-12 RX ADMIN — INSULIN ASPART 4 UNITS: 100 INJECTION, SOLUTION INTRAVENOUS; SUBCUTANEOUS at 09:12

## 2022-12-12 NOTE — PROGRESS NOTES
[facility]ael [facility] - [facility]  Otorhinolaryngology-Head & Neck Surgery  Progress Note    Subjective:     Post-Op Info:  * No surgery found *      Hospital Day: 20     Interval History: No issues overnight. Patient denies any abdominal pain or discomfort. No further emesis or drainage from neck.    Medications:  Continuous Infusions:   dextrose 10 % in water (D10W)       Scheduled Meds:   calcitrioL  0.25 mcg Per G Tube Daily    calcium carbonate  1,000 mg Per G Tube 5x Daily    ceFEPime (MAXIPIME) IVPB  2 g Intravenous Q8H    enoxaparin  40 mg Subcutaneous Daily    insulin aspart U-100  2 Units Subcutaneous 6 times per day    micafungin (MYCAMINE) IVPB  100 mg Intravenous Q24H    pantoprazole  40 mg Per G Tube Daily    polyethylene glycol  17 g Per G Tube Daily    sodium chloride 0.9%  10 mL Intravenous Q6H    vancomycin (VANCOCIN) IVPB  1,500 mg Intravenous Q24H     PRN Meds:acetaminophen, dextrose 10 % in water (D10W), dextrose 10%, dextrose 10%, glucagon (human recombinant), hydrALAZINE, insulin aspart U-100, melatonin, ondansetron, oxyCODONE, promethazine (PHENERGAN) IVPB, senna-docusate 8.6-50 mg, sodium chloride 0.9%, Flushing PICC Protocol **AND** sodium chloride 0.9% **AND** sodium chloride 0.9%, traZODone     Review of patient's allergies indicates:   Allergen Reactions    Lovastatin Itching     Objective:     Vital Signs (24h Range):  Temp:  [97 °F (36.1 °C)-98.7 °F (37.1 °C)] 97 °F (36.1 °C)  Pulse:  [65-71] 69  Resp:  [16-20] 16  SpO2:  [98 %-100 %] 98 %  BP: (104-135)/(63-72) 135/66       Lines/Drains/Airways       Peripherally Inserted Central Catheter Line  Duration             PICC Double Lumen 12/01/22 1017 right brachial 10 days              Drain  Duration                  Gastrostomy/Enterostomy LUQ -- days              Airway  Duration             Adult Surgical Airway Shiley Cuffed -- days         Laryngectomy (Do Not Remove) 11/09/22 Laryngectomy stoma 33 days                    Physical  "Exam  NAD  Awake and alert  Mild facial edema, stable  Trach within stoma, removed. Stoma appears healthy  Skin graft to midline neck overlying flap  Small area of dehiscence left laterally, near area of STSG, no drainage  Intraoral flap is soft without fluctuance, secretions thin, clear intraorally    Significant Labs:  CBC:   Recent Labs   Lab 12/12/22  0438   WBC 4.32   RBC 3.01*   HGB 9.1*   HCT 28.4*      MCV 94   MCH 30.2   MCHC 32.0     CMP:   Recent Labs   Lab 12/12/22  0438   *   CALCIUM 8.2*   ALBUMIN 2.6*   PROT 6.2   *   K 4.2   CO2 21*      BUN 18   CREATININE 0.8   ALKPHOS 745*   ALT 57*   AST 36   BILITOT 1.3*       Significant Diagnostics:  None    Assessment/Plan:     Laryngeal cancer  72 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    Admitted for bacteremia and fungemia, port removed and ID recommending IV antibiotics at home. PICC placed 12/1. 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     ENT/HNS:  - Laryngectomy protocol   -Sign above bed + outside door stating patient is "neck breather" and "can not be intubated by mouth"   -Clean laryngectomy stoma as needed, or daily  - Keep tracheostomy in place     - OK to replace tracheostomy if dislodged, only in place for swelling/secretion assistance -- replaced 11/27  - Humidified O2 via trach collar  - CT with fluid collection, will continue packing changes though with minimal output/packing -- packing changes discontinued; CTM     Neuro:   - Pain: Multimodality PRN regimen initiated     Cardiac:  - HDS  - H/O of paroxysmal a fib, will CTM      Pulmonary:   - PRN breathing treatments  - Humidified air per trach collar  - OK to remove tracheostomy for adequate suctioning     Renal:   - monitor Is and Os  - Cr stable     Infectious Disease:  - Leukocytosis and BC " positive for pseudomonas -- now improved WBC  - ID consulted; appreciate recommendations   - TTE unremarkable    - Antibiotics per ID team; to end 12/11; PICC line placed   Antibiotics (From admission, onward)    Start     Stop Route Frequency Ordered    12/07/22 1600  vancomycin 1,500 mg in dextrose 5 % 250 mL IVPB         -- IV Every 24 hours (non-standard times) 12/06/22 1740    12/06/22 1700  ceFEPIme (MAXIPIME) 2 g in dextrose 5 % in water (D5W) 5 % 50 mL IVPB (MB+)         -- IV Every 8 hours (non-standard times) 12/06/22 1044    11/24/22 0900  mupirocin 2 % ointment         11/29 0859 Nasl 2 times daily 11/24/22 0319         Hematology/Oncology:  - H/H stable  - Lovenox DVT prophylaxis      FEN/GI  - Elevated BG to 200s, consult to endocrine regarding home recs, appreciate assistance    --home TF at goal currently with tolerance; will trial bolus feeds today, pending nutrition recs  - Transaminitis and increased alk phos improved   - U/S of abdomen + liver dopppler     -- biliary sludge, otherwise unremarkable; doppler wnl   - Medicine consulted; appreciate recs     - CRP, ESR elevated     - Autoimmune workup negative      - Hepatitis screen negative   - slowly advance TFs    - Postoperative hypoparathyroidism   - Hypocalcemia on initial admission     - Continue tums QID, calcitriol   - Replace electrolytes as needed to keep K>4, Mg>2 --repleted K+ 11/27  - Bowel reg  - Protonix for GI prophylaxis, GERD  - STRICT nausea control ondansetron, phenergan        MSK  - Out of bed as tolerated  - STSG donor site healed     Dispo:  - Discharge pending tolerance of bolus tube feeds          Henna Mckoy MD  Otorhinolaryngology-Head & Neck Surgery  [facility]

## 2022-12-12 NOTE — PLAN OF CARE
Pt AAOx4, VSS. Laryngectomy, 5L trach collar in place. Bilateral neck incisions. G tube with TF @ 55 (goal met and tolerated well). Pt communicates via writing or gesturing. Denies pain and nausea. R PICC. Safety precautions in place.

## 2022-12-12 NOTE — RESPIRATORY THERAPY
RAPID RESPONSE RESPIRATORY THERAPY PROACTIVE NOTE           Time of visit: 803     Code Status: Full Code   : 1950  Bed: 1055/1055 A:   MRN: [id number]  Time spent at the bedside: < 15 min    SITUATION    Evaluated patient for: LDA Check     BACKGROUND    Patient has a past medical history of Hypothyroidism, PEG (percutaneous endoscopic gastrostomy) adjustment/replacement/removal, and Type 2 diabetes mellitus, without long-term current use of insulin.  Clinically Significant Surgical Hx: laryngectomy    24 Hours Vitals Range:  Temp:  [97 °F (36.1 °C)-98.7 °F (37.1 °C)]   Pulse:  [65-72]   Resp:  [16-20]   BP: (111-135)/(65-72)   SpO2:  [98 %-100 %]     Labs:    Recent Labs     12/10/22  0533 22  0536 22  0438   * 133* 133*   K 4.0 4.2 4.2    105 104   CO2 21* 22* 21*   CREATININE 0.8 0.8 0.8   * 259* 228*        No results for input(s): PH, PCO2, PO2, HCO3, POCSATURATED, BE in the last 72 hours.    ASSESSMENT/INTERVENTIONS  Pt resting in bed with no respiratory concerns at this time. Trach and mp stoma clean and secure. All supplies visible at bedside.      Last VS   Temp: 97 °F (36.1 °C) (418)  Pulse: 72 (837)  Resp: 18 (837)  BP: 135/66 (418)  SpO2: 98 % (837)      Extra trachs at bedside: 4 & 6 CUFFED & 6, 7, 8 ETT's  Level of Consciousness: Level of Consciousness (AVPU): alert  Respiratory Effort: Respiratory Effort: Normal, Unlabored Expansion/Accessory Muscle Usage: Expansion/Accessory Muscles/Retractions: no use of accessory muscles, no retractions, expansion symmetric  All Lung Field Breath Sounds: All Lung Fields Breath Sounds: Anterior:, Lateral:, coarse, diminished  EEVR Breath Sounds: diminished  RUL Breath Sounds: coarse  RML Breath Sounds: coarse  RLL Breath Sounds: coarse  O2 Device/Concentration: 5L 21% TC  Surgical airway: Yes, Type: Shiley Size: 6, cuffed in laryngectomy stoma  Ambu at bedside: Ambu bag with the patient?:  Yes, Adult Ambu     Active Orders   Respiratory Care    Oxygen Continuous     Frequency: Continuous     Number of Occurrences: Until Specified     Order Questions:      Device type: Low flow      Device: Trach Collar      FiO2%: 28      Titrate O2 per Oxygen Titration Protocol: Yes      To maintain SpO2 goal of: >= 90%      Notify MD of: Inability to achieve desired SpO2; Sudden change in patient status and requires 20% increase in FiO2; Patient requires >60% FiO2    Routine tracheostomy care     Frequency: Q12H     Number of Occurrences: Until Specified    Tracheostomy replacement Once     Frequency: Once     Number of Occurrences: 1 Occurrences     Order Comments: Patient's laryngectomy stoma trach tube (6-0 Shiley cuffed) replace with new trach of same size 11/27  Please place additional 6-0 Shiley cuffed trach at bedside, MD to replace prn.         RECOMMENDATIONS    We recommend: RRT Recs: Continue POC per primary team.      FOLLOW-UP    Please call back the Rapid Response RT, Yumiko Velásquez, RRT at x [phone] for any questions or concerns.

## 2022-12-12 NOTE — ASSESSMENT & PLAN NOTE
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    Admitted for bacteremia and fungemia, port removed and ID recommending IV antibiotics at home. PICC placed 12/1. 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     ENT/HNS:  - Laryngectomy protocol   -Sign above bed + outside door stating patient is "neck breather" and "can not be intubated by mouth"   -Clean laryngectomy stoma as needed, or daily  - Keep tracheostomy in place     - OK to replace tracheostomy if dislodged, only in place for swelling/secretion assistance -- replaced 11/27  - Humidified O2 via trach collar  - CT with fluid collection, will continue packing changes though with minimal output/packing -- packing changes discontinued; CTM     Neuro:   - Pain: Multimodality PRN regimen initiated     Cardiac:  - HDS  - H/O of paroxysmal a fib, will CTM      Pulmonary:   - PRN breathing treatments  - Humidified air per trach collar  - OK to remove tracheostomy for adequate suctioning     Renal:   - monitor Is and Os  - Cr stable     Infectious Disease:  - Leukocytosis and BC positive for pseudomonas -- now improved WBC  - ID consulted; appreciate recommendations   - TTE unremarkable    - Antibiotics per ID team; to end 12/11; PICC line placed   Antibiotics (From admission, onward)    Start     Stop Route Frequency Ordered    12/07/22 1600  vancomycin 1,500 mg in dextrose 5 % 250 mL IVPB         -- IV Every 24 hours (non-standard times) 12/06/22 1740    12/06/22 1700  ceFEPIme (MAXIPIME) 2 g in dextrose 5 % in water (D5W) 5 % 50 mL IVPB (MB+)         -- IV Every 8 hours (non-standard times) 12/06/22 1044    11/24/22 0900  mupirocin 2 % ointment         11/29 0859 Nasl 2 times daily 11/24/22 0319         Hematology/Oncology:  - H/H stable  - Lovenox DVT prophylaxis      FEN/GI  - Elevated BG to 200s, " consult to endocrine regarding home recs, appreciate assistance    --home TF at goal currently with tolerance; will trial bolus feeds today, pending nutrition recs  - Transaminitis and increased alk phos improved   - U/S of abdomen + liver dopppler     -- biliary sludge, otherwise unremarkable; doppler wnl   - Medicine consulted; appreciate recs     - CRP, ESR elevated     - Autoimmune workup negative      - Hepatitis screen negative   - slowly advance TFs    - Postoperative hypoparathyroidism   - Hypocalcemia on initial admission     - Continue tums QID, calcitriol   - Replace electrolytes as needed to keep K>4, Mg>2 --repleted K+ 11/27  - Bowel reg  - Protonix for GI prophylaxis, GERD  - STRICT nausea control ondansetron, phenergan        MSK  - Out of bed as tolerated  - STSG donor site healed     Dispo:  - Discharge pending tolerance of bolus tube feeds

## 2022-12-12 NOTE — SUBJECTIVE & OBJECTIVE
"Interval HPI:   Overnight events:No acute events overnight. Patient in room 1055/1055 A. Blood glucose variable. BG at and above goal on current insulin regimen (Schedule Insulin and SSI (TPN/TF)). Steroid use- None .      Renal function- Normal   Vasopressors-  None     No diet orders on file     Eating:   on TF at TF 55 mL/hr  Nausea: No  Hypoglycemia and intervention: No  Fever: No      /67 (BP Location: Left arm, Patient Position: Lying)   Pulse 63   Temp 97.2 °F (36.2 °C) (Axillary)   Resp 18   Ht 5' 6" (1.676 m)   Wt 62.1 kg (137 lb)   SpO2 99%   BMI 22.11 kg/m²     Labs Reviewed and Include    Recent Labs   Lab 12/12/22  0438   *   CALCIUM 8.2*   ALBUMIN 2.6*   PROT 6.2   *   K 4.2   CO2 21*      BUN 18   CREATININE 0.8   ALKPHOS 745*   ALT 57*   AST 36   BILITOT 1.3*     Lab Results   Component Value Date    WBC 4.32 12/12/2022    HGB 9.1 (L) 12/12/2022    HCT 28.4 (L) 12/12/2022    MCV 94 12/12/2022     12/12/2022     No results for input(s): TSH, FREET4 in the last 168 hours.  Lab Results   Component Value Date    HGBA1C 5.8 11/22/2022       Nutritional status:   Body mass index is 22.11 kg/m².  Lab Results   Component Value Date    ALBUMIN 2.6 (L) 12/12/2022    ALBUMIN 2.5 (L) 12/11/2022    ALBUMIN 2.4 (L) 12/10/2022     No results found for: PREALBUMIN    Estimated Creatinine Clearance: 74.4 mL/min (based on SCr of 0.8 mg/dL).    Accu-Checks  Recent Labs     12/10/22  1203 12/10/22  1725 12/10/22  2345 12/11/22  0612 12/11/22  1157 12/11/22  1510 12/11/22  1945 12/12/22  0417 12/12/22  0915 12/12/22  1254   POCTGLUCOSE 205* 270* 249* 271* 187* 241* 203* 214* 214* 179*       Current Medications and/or Treatments Impacting Glycemic Control  Immunotherapy:    Immunosuppressants       None          Steroids:   Hormones (From admission, onward)      Start     Stop Route Frequency Ordered    12/10/22 1316  melatonin tablet 6 mg         -- Oral Nightly PRN 12/10/22 1216 "          Pressors:    Autonomic Drugs (From admission, onward)      None          Hyperglycemia/Diabetes Medications:   Antihyperglycemics (From admission, onward)      Start     Stop Route Frequency Ordered    12/11/22 1409  insulin aspart U-100 pen 1-10 Units         -- SubQ Every 4 hours PRN 12/11/22 1312    12/11/22 1311  insulin aspart U-100 pen 2 Units         -- SubQ 6 times per day 12/11/22 1312

## 2022-12-12 NOTE — PROGRESS NOTES
Therapy with Vancomycin complete and/or consult discontinued by provider.  Pharmacy will sign off, please re-consult as needed.    Feli Jeter, VaughnD

## 2022-12-12 NOTE — NURSING
Paged ENT on call and ask to resume patients home medication of trazodone 50 mg prn insomnia [refer to patients  med reconciliation list]. Patient is not sleeping with melatonin 6 mg ordered 12/11/22.  Dr. Hyatt returned call and will make appropriate  recommendations.

## 2022-12-12 NOTE — PLAN OF CARE
BOBO spoke with [facility] and Alexey/TEV[facility] to inform them that the pt will likely dc tomorrow.  SW met with the pt to update him.  He was agreeable to HH.  SW asked if he had all of the trach supplies that he needs at home and he nodded yes.  He does not remember the name of the company but know how to contact them for more supplies.  SW will f/u tomorrow.    Concepción Roman LCSW   PRN

## 2022-12-12 NOTE — CONSULTS
Nutrition consult received regarding bolus TF recommendations.  RD following, please see note from 12/8 for full RD assessment.    Recommendations/Interventions:  1) Bolus TF recommendations: Peptamen 1.5 Prebio - 5 cartons/day to provide 1875 kcals, 85 g of protein, 965 mL fluid.  2) RD to monitor & follow-up.    Thanks!  Gregoria MS, RD, LDN

## 2022-12-12 NOTE — PROGRESS NOTES
Pharmacokinetic Assessment Follow Up: IV Vancomycin    Vancomycin serum concentration assessment(s):    The trough level was drawn correctly and can be used to guide therapy at this time. The measurement is within the desired definitive target range of 15 to 20 mcg/mL.  - The trough level was drawn ~24 hours after previous dose and resulted at 17.8.    Vancomycin Regimen Plan:    Continue regimen to Vancomycin 1500 mg IV every 24 hours.  - Per ID recs and notes, vancomycin finished today after this PM's dose.  - Will not d/c vanc yet given consult still in place, but will not order new levels.   - Will f/u tomorrow with team and make sure we can d/c consult.   - If vanc continues, please draw random level sooner than scheduled trough if renal function changes significantly.    Drug levels (last 3 results):  Recent Labs   Lab Result Units 12/09/22  1539 12/11/22  1613   Vancomycin-Trough ug/mL 16.1 17.8       Pharmacy will continue to follow and monitor vancomycin.    Please contact pharmacy at extension [phone] for questions regarding this assessment.    Thank you for the consult,   Leticia Benoit       Patient brief summary:  Cyrus Batres Jr. is a 72 y.o. male initiated on antimicrobial therapy with IV Vancomycin for treatment of bacteremia    Actual Body Weight:   62.1 kg    Renal Function:   Estimated Creatinine Clearance: 74.4 mL/min (based on SCr of 0.8 mg/dL).    Dialysis Method (if applicable):  N/A

## 2022-12-12 NOTE — SUBJECTIVE & OBJECTIVE
Interval History: No issues overnight. Patient denies any abdominal pain or discomfort. No further emesis or drainage from neck.    Medications:  Continuous Infusions:   dextrose 10 % in water (D10W)       Scheduled Meds:   calcitrioL  0.25 mcg Per G Tube Daily    calcium carbonate  1,000 mg Per G Tube 5x Daily    ceFEPime (MAXIPIME) IVPB  2 g Intravenous Q8H    enoxaparin  40 mg Subcutaneous Daily    insulin aspart U-100  2 Units Subcutaneous 6 times per day    micafungin (MYCAMINE) IVPB  100 mg Intravenous Q24H    pantoprazole  40 mg Per G Tube Daily    polyethylene glycol  17 g Per G Tube Daily    sodium chloride 0.9%  10 mL Intravenous Q6H    vancomycin (VANCOCIN) IVPB  1,500 mg Intravenous Q24H     PRN Meds:acetaminophen, dextrose 10 % in water (D10W), dextrose 10%, dextrose 10%, glucagon (human recombinant), hydrALAZINE, insulin aspart U-100, melatonin, ondansetron, oxyCODONE, promethazine (PHENERGAN) IVPB, senna-docusate 8.6-50 mg, sodium chloride 0.9%, Flushing PICC Protocol **AND** sodium chloride 0.9% **AND** sodium chloride 0.9%, traZODone     Review of patient's allergies indicates:   Allergen Reactions    Lovastatin Itching     Objective:     Vital Signs (24h Range):  Temp:  [97 °F (36.1 °C)-98.7 °F (37.1 °C)] 97 °F (36.1 °C)  Pulse:  [65-71] 69  Resp:  [16-20] 16  SpO2:  [98 %-100 %] 98 %  BP: (104-135)/(63-72) 135/66       Lines/Drains/Airways       Peripherally Inserted Central Catheter Line  Duration             PICC Double Lumen 12/01/22 1017 right brachial 10 days              Drain  Duration                  Gastrostomy/Enterostomy LUQ -- days              Airway  Duration             Adult Surgical Airway Shiley Cuffed -- days         Laryngectomy (Do Not Remove) 11/09/22 Laryngectomy stoma 33 days                    Physical Exam  NAD  Awake and alert  Mild facial edema, stable  Trach within stoma, removed. Stoma appears healthy  Skin graft to midline neck overlying flap  Small area of  dehiscence left laterally, near area of STSG, no drainage  Intraoral flap is soft without fluctuance, secretions thin, clear intraorally    Significant Labs:  CBC:   Recent Labs   Lab 12/12/22 0438   WBC 4.32   RBC 3.01*   HGB 9.1*   HCT 28.4*      MCV 94   MCH 30.2   MCHC 32.0     CMP:   Recent Labs   Lab 12/12/22 0438   *   CALCIUM 8.2*   ALBUMIN 2.6*   PROT 6.2   *   K 4.2   CO2 21*      BUN 18   CREATININE 0.8   ALKPHOS 745*   ALT 57*   AST 36   BILITOT 1.3*       Significant Diagnostics:  None

## 2022-12-12 NOTE — PROGRESS NOTES
"Nael Carey - [facility]  Endocrinology  Progress Note    Admit Date: 11/23/2022     Reason for Consult: Management of T2DM, Hyperglycemia       Diabetes diagnosis year: 2010     Home Diabetes Medications:  Ozempic 0.25 mg weekly, Metformin unsure dose     How often checking glucose at home?  Once daily in morning  BG readings on regimen: low 100s  Hypoglycemia on the regimen?  No  Missed doses on regimen?  n/a     Diabetes Complications include:     None      Complicating diabetes co morbidities:   pAfib, HTN, active cancer         HPI:   Patient is a 71 y.o. male with hypertension, hypothyroidism, diabetes type 2, GERD , laryngeal cancer and malignant neoplasm of base of tongue who underwent a total glossectomy [place]ctomy with lateral thigh [place] and [place] [facility] [place]  [place]  presented to the ED on account of shortness of breath and heavy mucus production.  Admitted for bacteremia and fungemia, port removed and ID recommending IV antibiotics at home. PICC placed 12/1. 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  Endocrine consulted for bg management.      Interval HPI:   Overnight events:No acute events overnight. Patient in room 1055/1055 A. Blood glucose variable. BG at and above goal on current insulin regimen (Schedule Insulin and SSI (TPN/TF)). Steroid use- None .      Renal function- Normal   Vasopressors-  None     No diet orders on file     Eating:   on TF at TF 55 mL/hr  Nausea: No  Hypoglycemia and intervention: No  Fever: No      /67 (BP Location: Left arm, Patient Position: Lying)   Pulse 63   Temp 97.2 °F (36.2 °C) (Axillary)   Resp 18   Ht 5' 6" (1.676 m)   Wt 62.1 kg (137 lb)   SpO2 99%   BMI 22.11 kg/m²     Labs Reviewed and Include    Recent Labs   Lab 12/12/22  0438   *   CALCIUM 8.2*   ALBUMIN 2.6*   PROT 6.2   *   K 4.2   CO2 21*      BUN 18   CREATININE 0.8   ALKPHOS 745*   ALT 57*   AST 36   BILITOT 1.3* "     Lab Results   Component Value Date    WBC 4.32 12/12/2022    HGB 9.1 (L) 12/12/2022    HCT 28.4 (L) 12/12/2022    MCV 94 12/12/2022     12/12/2022     No results for input(s): TSH, FREET4 in the last 168 hours.  Lab Results   Component Value Date    HGBA1C 5.8 11/22/2022       Nutritional status:   Body mass index is 22.11 kg/m².  Lab Results   Component Value Date    ALBUMIN 2.6 (L) 12/12/2022    ALBUMIN 2.5 (L) 12/11/2022    ALBUMIN 2.4 (L) 12/10/2022     No results found for: PREALBUMIN    Estimated Creatinine Clearance: 74.4 mL/min (based on SCr of 0.8 mg/dL).    Accu-Checks  Recent Labs     12/10/22  1203 12/10/22  1725 12/10/22  2345 12/11/22  0612 12/11/22  1157 12/11/22  1510 12/11/22  1945 12/12/22  0417 12/12/22  0915 12/12/22  1254   POCTGLUCOSE 205* 270* 249* 271* 187* 241* 203* 214* 214* 179*       Current Medications and/or Treatments Impacting Glycemic Control  Immunotherapy:    Immunosuppressants       None          Steroids:   Hormones (From admission, onward)      Start     Stop Route Frequency Ordered    12/10/22 1316  melatonin tablet 6 mg         -- Oral Nightly PRN 12/10/22 1216          Pressors:    Autonomic Drugs (From admission, onward)      None          Hyperglycemia/Diabetes Medications:   Antihyperglycemics (From admission, onward)      Start     Stop Route Frequency Ordered    12/11/22 1409  insulin aspart U-100 pen 1-10 Units         -- SubQ Every 4 hours PRN 12/11/22 1312    12/11/22 1311  insulin aspart U-100 pen 2 Units         -- SubQ 6 times per day 12/11/22 1312            ASSESSMENT and PLAN    Type 2 diabetes mellitus, without long-term current use of insulin  BG goal: 140-180     Sugars remain above goal  Pt on TF.  W/ plans to go home on TF possibly tomorrow.  Pt reports previously did bolus feeds 9a-9p at home.  He is against going home on any type of scheduled insulin as his sugars were stable in the past, but will consider SSI to cover elevations.  Will adjust  insulin here to q4hr to cover TF and start on 2 units q4.     - Continue Novolog 2 units q4 hr while pt on TF. Please hold if TF held or if BG under 100.  - Continue Novolog Moderate dose correction with ISF 25 starting at 150   - POCT Glucose every 4 hours  - Hypoglycemia protocol in place      ** Please notify Endocrine for any change and/or advance in diet**  ** Please call Endocrine for any BG related issues **     Discharge Planning:   - Continue previous regimen of Ozempic and Metformin   - Start correction scale Novolog every 4 hours during TF period as needed for high BG  Add correction scale if needed.  Blood sugar 150 to 200 add 2 units  Blood sugar 201 to 250 add 4 units  Blood sugar 251 to 300 add 6 units  Blood sugar 301 to 350 add 8 units  Blood sugar greater than 350 add 10 units  - Insurance approved diabetes testing supplies to check blood sugar 4 times a day and as needed.  - BD pen needles   - Insurance approved glucagon for extreme hypoglycemia (Baqsimi or Zegalogue if insurance approved)  - Pt notified to contact me with persistent high or persistent low blood sugars  - Will need close f/u w/ home provider or in discharge clinic.        Laryngeal cancer  Managed by primary team  Will optimize BG    Fungemia  Completed therapy  Infection can increase BG levels    Bacteremia  Completed therapy  Infection can increase BG levels      Melvin Garzon PA-C  Endocrinology  [facility]

## 2022-12-12 NOTE — ASSESSMENT & PLAN NOTE
BG goal: 140-180     Sugars remain above goal  Pt on TF.  W/ plans to go home on TF possibly tomorrow.  Pt reports previously did bolus feeds 9a-9p at home.  He is against going home on any type of scheduled insulin as his sugars were stable in the past, but will consider SSI to cover elevations.  Will adjust insulin here to q4hr to cover TF and start on 2 units q4.     - Continue Novolog 2 units q4 hr while pt on TF. Please hold if TF held or if BG under 100.  - Continue Novolog Moderate dose correction with ISF 25 starting at 150   - POCT Glucose every 4 hours  - Hypoglycemia protocol in place      ** Please notify Endocrine for any change and/or advance in diet**  ** Please call Endocrine for any BG related issues **     Discharge Planning:   - Continue previous regimen of Ozempic and Metformin   - Start correction scale Novolog every 4 hours during TF period as needed for high BG  Add correction scale if needed.  Blood sugar 150 to 200 add 2 units  Blood sugar 201 to 250 add 4 units  Blood sugar 251 to 300 add 6 units  Blood sugar 301 to 350 add 8 units  Blood sugar greater than 350 add 10 units  - Insurance approved diabetes testing supplies to check blood sugar 4 times a day (Before meals and at bedtime) and as needed.  - BD pen needles   - Insurance approved glucagon for extreme hypoglycemia (Baqsimi or Zegalogue if insurance approved)  - Pt notified to contact me with persistent high or persistent low blood sugars  - Will need close f/u w/ home provider or in discharge clinic.

## 2022-12-13 VITALS
BODY MASS INDEX: 22.02 KG/M2 | HEIGHT: 66 IN | HEART RATE: 70 BPM | DIASTOLIC BLOOD PRESSURE: 64 MMHG | RESPIRATION RATE: 18 BRPM | TEMPERATURE: 98 F | OXYGEN SATURATION: 99 % | WEIGHT: 137 LBS | SYSTOLIC BLOOD PRESSURE: 125 MMHG

## 2022-12-13 PROBLEM — K75.89 CHOLESTATIC HEPATITIS: Status: RESOLVED | Noted: 2022-11-25 | Resolved: 2022-12-13

## 2022-12-13 PROBLEM — B49 FUNGEMIA: Status: RESOLVED | Noted: 2022-11-26 | Resolved: 2022-12-13

## 2022-12-13 PROBLEM — R78.81 BACTEREMIA: Status: RESOLVED | Noted: 2022-11-26 | Resolved: 2022-12-13

## 2022-12-13 LAB
ALBUMIN SERPL BCP-MCNC: 3 G/DL (ref 3.5–5.2)
ALP SERPL-CCNC: 826 U/L (ref 55–135)
ALT SERPL W/O P-5'-P-CCNC: 59 U/L (ref 10–44)
ANION GAP SERPL CALC-SCNC: 7 MMOL/L (ref 8–16)
AST SERPL-CCNC: 36 U/L (ref 10–40)
BASOPHILS # BLD AUTO: 0.06 K/UL (ref 0–0.2)
BASOPHILS NFR BLD: 1.1 % (ref 0–1.9)
BILIRUB SERPL-MCNC: 1.3 MG/DL (ref 0.1–1)
BUN SERPL-MCNC: 19 MG/DL (ref 8–23)
CALCIUM SERPL-MCNC: 9.1 MG/DL (ref 8.7–10.5)
CHLORIDE SERPL-SCNC: 102 MMOL/L (ref 95–110)
CO2 SERPL-SCNC: 24 MMOL/L (ref 23–29)
CREAT SERPL-MCNC: 0.8 MG/DL (ref 0.5–1.4)
DIFFERENTIAL METHOD: ABNORMAL
EOSINOPHIL # BLD AUTO: 0.2 K/UL (ref 0–0.5)
EOSINOPHIL NFR BLD: 2.7 % (ref 0–8)
ERYTHROCYTE [DISTWIDTH] IN BLOOD BY AUTOMATED COUNT: 15.7 % (ref 11.5–14.5)
EST. GFR  (NO RACE VARIABLE): >60 ML/MIN/1.73 M^2
GLUCOSE SERPL-MCNC: 152 MG/DL (ref 70–110)
HCT VFR BLD AUTO: 30 % (ref 40–54)
HGB BLD-MCNC: 9.9 G/DL (ref 14–18)
IMM GRANULOCYTES # BLD AUTO: 0.25 K/UL (ref 0–0.04)
IMM GRANULOCYTES NFR BLD AUTO: 4.5 % (ref 0–0.5)
LYMPHOCYTES # BLD AUTO: 1.1 K/UL (ref 1–4.8)
LYMPHOCYTES NFR BLD: 19.1 % (ref 18–48)
MCH RBC QN AUTO: 30.6 PG (ref 27–31)
MCHC RBC AUTO-ENTMCNC: 33 G/DL (ref 32–36)
MCV RBC AUTO: 93 FL (ref 82–98)
MONOCYTES # BLD AUTO: 0.3 K/UL (ref 0.3–1)
MONOCYTES NFR BLD: 6.2 % (ref 4–15)
NEUTROPHILS # BLD AUTO: 3.7 K/UL (ref 1.8–7.7)
NEUTROPHILS NFR BLD: 66.4 % (ref 38–73)
NRBC BLD-RTO: 0 /100 WBC
PLATELET # BLD AUTO: 243 K/UL (ref 150–450)
PMV BLD AUTO: 10.7 FL (ref 9.2–12.9)
POCT GLUCOSE: 183 MG/DL (ref 70–110)
POCT GLUCOSE: 207 MG/DL (ref 70–110)
POCT GLUCOSE: 237 MG/DL (ref 70–110)
POTASSIUM SERPL-SCNC: 4.5 MMOL/L (ref 3.5–5.1)
PROT SERPL-MCNC: 6.9 G/DL (ref 6–8.4)
RBC # BLD AUTO: 3.24 M/UL (ref 4.6–6.2)
SODIUM SERPL-SCNC: 133 MMOL/L (ref 136–145)
WBC # BLD AUTO: 5.5 K/UL (ref 3.9–12.7)

## 2022-12-13 PROCEDURE — 63700000 PHARM REV CODE 250 ALT 637 W/O HCPCS: Performed by: STUDENT IN AN ORGANIZED HEALTH CARE EDUCATION/TRAINING PROGRAM

## 2022-12-13 PROCEDURE — 63600175 PHARM REV CODE 636 W HCPCS

## 2022-12-13 PROCEDURE — 99900031 HC PATIENT EDUCATION (STAT)

## 2022-12-13 PROCEDURE — 27000221 HC OXYGEN, UP TO 24 HOURS

## 2022-12-13 PROCEDURE — 99900022

## 2022-12-13 PROCEDURE — 80053 COMPREHEN METABOLIC PANEL: CPT | Performed by: STUDENT IN AN ORGANIZED HEALTH CARE EDUCATION/TRAINING PROGRAM

## 2022-12-13 PROCEDURE — 25000003 PHARM REV CODE 250: Performed by: OTOLARYNGOLOGY

## 2022-12-13 PROCEDURE — 25000003 PHARM REV CODE 250: Performed by: STUDENT IN AN ORGANIZED HEALTH CARE EDUCATION/TRAINING PROGRAM

## 2022-12-13 PROCEDURE — 94761 N-INVAS EAR/PLS OXIMETRY MLT: CPT

## 2022-12-13 PROCEDURE — A4216 STERILE WATER/SALINE, 10 ML: HCPCS | Performed by: OTOLARYNGOLOGY

## 2022-12-13 PROCEDURE — 99900026 HC AIRWAY MAINTENANCE (STAT)

## 2022-12-13 PROCEDURE — 25000003 PHARM REV CODE 250

## 2022-12-13 PROCEDURE — 85025 COMPLETE CBC W/AUTO DIFF WBC: CPT | Performed by: STUDENT IN AN ORGANIZED HEALTH CARE EDUCATION/TRAINING PROGRAM

## 2022-12-13 PROCEDURE — 99900035 HC TECH TIME PER 15 MIN (STAT)

## 2022-12-13 RX ORDER — GLUCAGON HYDROCHLORIDE 1 MG
1 KIT INJECTION
Qty: 1 EACH | Refills: 1 | Status: SHIPPED | OUTPATIENT
Start: 2022-12-13 | End: 2022-12-13 | Stop reason: SDUPTHER

## 2022-12-13 RX ORDER — CALCITRIOL 1 UG/ML
0.25 SOLUTION ORAL DAILY
Qty: 15 ML | Refills: 3 | Status: SHIPPED | OUTPATIENT
Start: 2022-12-14 | End: 2022-12-13 | Stop reason: SDUPTHER

## 2022-12-13 RX ORDER — PEN NEEDLE, DIABETIC 31 GX5/16"
NEEDLE, DISPOSABLE MISCELLANEOUS
Qty: 100 EACH | Refills: 3 | Status: SHIPPED | OUTPATIENT
Start: 2022-12-13 | End: 2023-03-07

## 2022-12-13 RX ORDER — FAMOTIDINE 40 MG/5ML
20 POWDER, FOR SUSPENSION ORAL 2 TIMES DAILY
Qty: 50 ML | Refills: 11 | Status: SHIPPED | OUTPATIENT
Start: 2022-12-14 | End: 2023-12-14

## 2022-12-13 RX ORDER — INSULIN ASPART 100 [IU]/ML
0-10 INJECTION, SOLUTION INTRAVENOUS; SUBCUTANEOUS
Qty: 12 ML | Refills: 0 | Status: SHIPPED | OUTPATIENT
Start: 2022-12-13 | End: 2023-03-07

## 2022-12-13 RX ORDER — CALCIUM CARBONATE 200(500)MG
1000 TABLET,CHEWABLE ORAL
Qty: 300 TABLET | Refills: 11 | Status: SHIPPED | OUTPATIENT
Start: 2022-12-13 | End: 2023-06-15

## 2022-12-13 RX ORDER — GLUCAGON HYDROCHLORIDE 1 MG
1 KIT INJECTION
Qty: 1 EACH | Refills: 1 | Status: SHIPPED | OUTPATIENT
Start: 2022-12-13 | End: 2023-01-09

## 2022-12-13 RX ORDER — POLYETHYLENE GLYCOL 3350 17 G/17G
17 POWDER, FOR SOLUTION ORAL DAILY
Qty: 510 G | Refills: 0 | Status: SHIPPED | OUTPATIENT
Start: 2022-12-14 | End: 2022-12-16 | Stop reason: ALTCHOICE

## 2022-12-13 RX ORDER — CALCITRIOL 1 UG/ML
0.25 SOLUTION ORAL DAILY
Qty: 15 ML | Refills: 3 | Status: SHIPPED | OUTPATIENT
Start: 2022-12-14 | End: 2023-11-15 | Stop reason: SDUPTHER

## 2022-12-13 RX ADMIN — ENOXAPARIN SODIUM 40 MG: 40 INJECTION SUBCUTANEOUS at 04:12

## 2022-12-13 RX ADMIN — INSULIN ASPART 2 UNITS: 100 INJECTION, SOLUTION INTRAVENOUS; SUBCUTANEOUS at 05:12

## 2022-12-13 RX ADMIN — CALCITRIOL 0.25 MCG: 1 SOLUTION ORAL at 08:12

## 2022-12-13 RX ADMIN — CALCIUM CARBONATE (ANTACID) CHEW TAB 500 MG 1000 MG: 500 CHEW TAB at 05:12

## 2022-12-13 RX ADMIN — INSULIN ASPART 4 UNITS: 100 INJECTION, SOLUTION INTRAVENOUS; SUBCUTANEOUS at 05:12

## 2022-12-13 RX ADMIN — CALCIUM CARBONATE (ANTACID) CHEW TAB 500 MG 1000 MG: 500 CHEW TAB at 01:12

## 2022-12-13 RX ADMIN — INSULIN ASPART 2 UNITS: 100 INJECTION, SOLUTION INTRAVENOUS; SUBCUTANEOUS at 09:12

## 2022-12-13 RX ADMIN — PANTOPRAZOLE SODIUM 40 MG: 40 GRANULE, DELAYED RELEASE ORAL at 08:12

## 2022-12-13 RX ADMIN — POLYETHYLENE GLYCOL 3350 17 G: 17 POWDER, FOR SOLUTION ORAL at 08:12

## 2022-12-13 RX ADMIN — CALCIUM CARBONATE (ANTACID) CHEW TAB 500 MG 1000 MG: 500 CHEW TAB at 09:12

## 2022-12-13 RX ADMIN — INSULIN ASPART 4 UNITS: 100 INJECTION, SOLUTION INTRAVENOUS; SUBCUTANEOUS at 08:12

## 2022-12-13 RX ADMIN — Medication 10 ML: at 12:12

## 2022-12-13 RX ADMIN — Medication 10 ML: at 05:12

## 2022-12-13 RX ADMIN — INSULIN ASPART 2 UNITS: 100 INJECTION, SOLUTION INTRAVENOUS; SUBCUTANEOUS at 01:12

## 2022-12-13 NOTE — NURSING
Gave patient discharge instructions. All questions answered. Patient and spouse demonstrates understanding of d/c instructions. Removed all patient's IV per order. Medication received from pharmacy. Given patient 14 can's of patient's peptimen formula to go home with. Patient took home all patient belongings. Patient wheelchaired downstairs by spouse to family vehicle.

## 2022-12-13 NOTE — SUBJECTIVE & OBJECTIVE
Interval History: NAEON. Tolerating bolus feeds.     Medications:  Continuous Infusions:  Scheduled Meds:   calcitrioL  0.25 mcg Per G Tube Daily    calcium carbonate  1,000 mg Per G Tube 5x Daily    enoxaparin  40 mg Subcutaneous Daily    insulin aspart U-100  2 Units Subcutaneous Q4H While awake    pantoprazole  40 mg Per G Tube Daily    polyethylene glycol  17 g Per G Tube Daily    sodium chloride 0.9%  10 mL Intravenous Q6H     PRN Meds:acetaminophen, dextrose 10%, dextrose 10%, glucagon (human recombinant), hydrALAZINE, insulin aspart U-100, melatonin, ondansetron, oxyCODONE, promethazine (PHENERGAN) IVPB, senna-docusate 8.6-50 mg, sodium chloride 0.9%, Flushing PICC Protocol **AND** sodium chloride 0.9% **AND** sodium chloride 0.9%, traZODone     Review of patient's allergies indicates:   Allergen Reactions    Lovastatin Itching     Objective:     Vital Signs (24h Range):  Temp:  [96.8 °F (36 °C)-97.6 °F (36.4 °C)] 97 °F (36.1 °C)  Pulse:  [63-76] 69  Resp:  [16-20] 16  SpO2:  [98 %-100 %] 99 %  BP: (117-129)/(67-78) 129/72       Lines/Drains/Airways       Peripherally Inserted Central Catheter Line  Duration             PICC Double Lumen 12/01/22 1017 right brachial 11 days              Drain  Duration                  Gastrostomy/Enterostomy LUQ -- days              Airway  Duration             Adult Surgical Airway Shiley Cuffed -- days         Laryngectomy (Do Not Remove) 11/09/22 Laryngectomy stoma 34 days                    Physical Exam  NAD  Awake and alert  Mild facial edema, stable  Trach within stoma, removed. Stoma appears healthy  Skin graft to midline neck overlying flap  Small area of dehiscence left laterally, near area of STSG, no drainage  Intraoral flap is soft without fluctuance, secretions thin, clear intraorally    Significant Labs:  CBC:   Recent Labs   Lab 12/13/22  0348   WBC 5.50   RBC 3.24*   HGB 9.9*   HCT 30.0*      MCV 93   MCH 30.6   MCHC 33.0     CMP:   Recent Labs   Lab  Patient calling and states the [facility] has not received her referral for rheumatology. Patient is requesting this be called in so it gets to them faster. States the phone number is [phone]. Please advise, thank you.   12/13/22  0348   *   CALCIUM 9.1   ALBUMIN 3.0*   PROT 6.9   *   K 4.5   CO2 24      BUN 19   CREATININE 0.8   ALKPHOS 826*   ALT 59*   AST 36   BILITOT 1.3*       Significant Diagnostics:  None

## 2022-12-13 NOTE — PLAN OF CARE
[facility]  Discharge Final Note    Primary Care Provider: Maico Patterson MD    Expected Discharge Date: 12/13/2022    Final Discharge Note (most recent)       Final Note - 12/13/22 1536          Final Note    Assessment Type Final Discharge Note     Anticipated Discharge Disposition Home-Health Care [facility]     Hospital Resources/Appts/Education Provided Provided patient/caregiver with written discharge plan information        Post-Acute Status    Post-Acute Authorization Home Health     Home Health Status Set-up Complete/Auth obtained     Coverage [employer] Managed medicare     Patient choice form signed by patient/caregiver List with quality metrics by geographic area provided     Discharge Delays None known at this time                     Important Message from Medicare  Important Message from Medicare regarding Discharge Appeal Rights: Given to patient/caregiver, Explained to patient/caregiver, Signed/date by patient/caregiver     Date IMM was signed: 12/13/22  Time IMM was signed: 1141    Contact Info       Fariha Muñoz NP   Specialty: Otolaryngology    [facility] [facility] [address]   Phone: [phone]       Next Steps: Follow up in 3 week(s)          Pt d/c home with family, [facility] and infusion following for care. Per pt and wife pt has all supplies at home. No additional needs at this time.     Pam Webber LCSW  Case Management/[facility]  [phone]

## 2022-12-13 NOTE — PLAN OF CARE
POC reviewed with Pt. Pt utilized written communication.  AAOx4. VSS on 5L on 28%. Continuous tube feeding turned off this afternoon. 1st bolus feed done at 17:00, pt tolerated well.  Voiding per urinal, adequate UOP.  Up ambulating in room. Denies pain. Bed in lowest position, call light within reach. WCTM.

## 2022-12-13 NOTE — CARE UPDATE
-Glucose Goal 140-180    -A1C:   Hemoglobin A1C   Date Value Ref Range Status   11/22/2022 5.8 4.5 - 6.2 % Final     Comment:     According to ADA guidelines, hemoglobin A1C <7.0% represents  optimal control in non-pregnant diabetic patients.  Different  metrics may apply to specific populations.   Standards of Medical Care in Diabetes - 2016.    For the purpose of screening for the presence of diabetes:  <5.7%     Consistent with the absence of diabetes  5.7-6.4%  Consistent with increasing risk for diabetes   (prediabetes)  >or=6.5%  Consistent with diabetes    Currently no consensus exists for use of hemoglobin A1C  for diagnosis of diabetes for children.       -GLUCOSE TREND FOR THE PAST 24HRS:   Recent Labs   Lab 12/12/22  1254 12/12/22  1649 12/12/22  2120 12/13/22  0511 12/13/22  0850 12/13/22  1303   POCTGLUCOSE 179* 131* 209* 207* 237* 183*         -NO HYPOGYCEMIAS NOTED     - Diet  Diet NPO    BG goal: 140-180     Sugars variable with some improvement.  Pt on bolus TF started yesterday.  W/ plans to go home on TF today.  Pt reports previously did bolus feeds 9a-9p at home.  He is against going home on any type of scheduled insulin as his sugars were stable in the past, but will consider SSI to cover elevations.  Will monitor on regimen with adjustment to bolus TF.     Plan  - Continue Novolog 2 units q4 hr while pt on TF. Please hold if TF held or if BG under 100.  - Continue Novolog Moderate dose correction with ISF 25 starting at 150   - POCT Glucose every 4 hours  - Hypoglycemia protocol in place      ** Please notify Endocrine for any change and/or advance in diet**  ** Please call Endocrine for any BG related issues **     Discharge Planning:   - Continue previous regimen of Ozempic and Metformin   - Start correction scale Novolog every 4 hours during TF period as needed for high BG  Add correction scale if needed.  Blood sugar 150 to 200 add 2 units  Blood sugar 201 to 250 add 4 units  Blood sugar  251 to 300 add 6 units  Blood sugar 301 to 350 add 8 units  Blood sugar greater than 350 add 10 units  - Insurance approved diabetes testing supplies to check blood sugar 4 times a day (Before meals and at bedtime) and as needed.  - BD pen needles   - Insurance approved glucagon for extreme hypoglycemia (Baqsimi or Zegalogue if insurance approved)  - Pt notified to contact me with persistent high or persistent low blood sugars  - Will need close f/u w/ home provider or in discharge clinic.

## 2022-12-13 NOTE — ASSESSMENT & PLAN NOTE
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    Admitted for bacteremia and fungemia, port removed and ID recommending IV antibiotics at home. PICC placed 12/1. Evidence of fistula on L neck with drainage, managed with BID packing changes, completed as tract appears dry without drainage despite resuming TF     ENT/HNS:  - Laryngectomy protocol   -Sign above bed + outside door stating patient is "neck breather" and "can not be intubated by mouth"   -Clean laryngectomy stoma as needed, or daily  - Keep tracheostomy in place     - OK to replace tracheostomy if dislodged, only in place for swelling/secretion assistance -- replaced 11/27  - Humidified O2 via trach collar  - CT with fluid collection, will continue packing changes though with minimal output/packing -- packing changes discontinued; CTM     Neuro:   - Pain: Multimodality PRN regimen initiated     Cardiac:  - HDS  - H/O of paroxysmal a fib, will CTM      Pulmonary:   - PRN breathing treatments  - Humidified air per trach collar  - OK to remove tracheostomy for adequate suctioning     Renal:   - monitor Is and Os  - Cr stable     Infectious Disease:  - Leukocytosis and BC positive for pseudomonas -- now improved WBC  - ID consulted; appreciate recommendations   - TTE unremarkable    - Antibiotics per ID team; to end 12/11; course completed   Antibiotics (From admission, onward)    Start     Stop Route Frequency Ordered    11/24/22 0900  mupirocin 2 % ointment         11/29 0859 Nasl 2 times daily 11/24/22 0319         Hematology/Oncology:  - H/H stable  - Lovenox DVT prophylaxis      FEN/GI  - Elevated BG to 200s, consult to endocrine regarding home recs, appreciate assistance   - Continue bolus feeds   - Transaminitis and increased alk phos improved   - U/S of abdomen + liver dopppler     -- biliary sludge, otherwise unremarkable; " doppler wnl   - Medicine consulted; appreciate recs     - CRP, ESR elevated     - Autoimmune workup negative      - Hepatitis screen negative   - Postoperative hypoparathyroidism   - Hypocalcemia on initial admission     - Continue tums QID, calcitriol   - Replace electrolytes as needed to keep K>4, Mg>2 --repleted K+ 11/27  - Bowel reg  - Protonix for GI prophylaxis, GERD  - STRICT nausea control ondansetron, phenergan        MSK  - Out of bed as tolerated  - STSG donor site healed     Dispo:  - Discharge pending tolerance of bolus tube feeds

## 2022-12-13 NOTE — DISCHARGE SUMMARY
Nael Carey - [facility]  Otorhinolaryngology-Head & Neck Surgery  Discharge Summary      Patient Name: Cyrus Batres Jr.  MRN: [id number]  Admission Date: 11/23/2022  Hospital Length of Stay: 20 days  Discharge Date and Time:  12/13/2022 11:36 AM  Attending Physician: Alise Hart MD   Discharging Provider: Henna Mckoy MD  Primary Care Provider: Maico Patterson MD    HPI:   72 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 Patient presented to outside ED yesterday as patient was feeling overall malaise and according to patient had persistent cough. Was found to have elevated WBC and blood cultures positive for pseudomonas. Was admitted as he was unable to be transferred. 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 Had CT scan which showed possible phlegmon in submandibular region although on personal read appears more consistent with postoperative changes. Patient denies any worsening neck swelling or pain. Denies any regurgitation or difficulty tolerating tube feeds.      * No surgery found *      Indwelling Lines/Drains at time of discharge:   Lines/Drains/Airways     Peripherally Inserted Central Catheter Line  Duration           PICC Double Lumen 12/01/22 1017 right brachial 12 days          Drain  Duration                Gastrostomy/Enterostomy LUQ -- days          Airway  Duration           Adult Surgical Airway Shiley Cuffed -- days         Laryngectomy (Do Not Remove) 11/09/22 Laryngectomy stoma 34 days              Hospital Course: The patient is a 72 year old male with history of glottic SCC s/p TL in 1/2022, with development of BOT SCC s/p XRT  s/p total glossectomy, pharyngectomy, and ALT free flap and STSG reconstruction on 11/9. 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 This was treated with packing changes and has  ultimately resolved. His course was complicated by sepsis and completed a course of IV antibiotics. He also had transaminitis on admission with a negative work-up. His liver enzymes have significantly improved by the time of discharge. He was transitioned to bolus tube feeds and was tolerating these for 24 hours at the time of discharge. Endocrinology was consulted for assistance with hyperglycemia and have recommend medication adjustments for discharge as well as close clinic follow up with his PCP. This has been ordered by the Endocrinology team for discharge.        Goals of Care Treatment Preferences:  Code Status: Full Code      Consults:   Consults (From admission, onward)        Status Ordering Provider     Inpatient consult to Registered Dietitian/Nutritionist  Once        Provider:  (Not yet assigned)    Completed EDISON ALDANA     Inpatient consult to Endocrinology  Once        Provider:  (Not yet assigned)    Completed JOSH JAFFE     Inpatient consult to PICC team ([facility])  Once        Provider:  (Not yet assigned)    Completed EDISON ALDANA     Inpatient consult to General Surgery  Once        Provider:  (Not yet assigned)    Completed GONSALO KNOX     Inpatient consult to Ophthalmology  Once        Provider:  (Not yet assigned)    Completed GONSALO KNOX     Inpatient consult to Midline team  Once        Provider:  (Not yet assigned)    Completed ALANNAH BRERAJESH     Inpatient consult to Infectious Diseases  Once        Provider:  (Not yet assigned)    Completed ALANNAH BRERAJESH     Inpatient consult to Hospital Medicine-General  Once        Provider:  (Not yet assigned)    Completed EDISON ALDANA          Significant Diagnostic Studies: see progress notes    Pending Diagnostic Studies:     None        Final Active Diagnoses:    Diagnosis Date Noted POA    PRINCIPAL PROBLEM:  Laryngeal cancer [C32.9] 11/22/2022 Yes    Type 2 diabetes mellitus, without long-term current  use of insulin [E11.9] 11/22/2022 Yes      Problems Resolved During this Admission:    Diagnosis Date Noted Date Resolved POA    Fungemia [B49] 11/26/2022 12/13/2022 Yes    Bacteremia [R78.81] 11/26/2022 12/13/2022 Yes    Cholestatic hepatitis [K75.89] 11/25/2022 12/13/2022 Yes      Discharged Condition: good    Disposition: Home or Self Care    Follow Up:   Follow-up Information     Fariha Muñoz NP Follow up in 3 week(s).    Specialty: Otolaryngology  Contact information:  [address]  [facility] [address]  [phone]                       Patient Instructions:      Diet NPO     Notify your health care provider if you experience any of the following:  redness, tenderness, or signs of infection (pain, swelling, redness, odor or green/yellow discharge around incision site)     No dressing needed     Tube Feedings/Formulas   Order Comments: 5 cans per day. Flush with 100 mL before and after each feed.     Order Specific Question Answer Comments   Select Adult Formula: Peptamen 1.5 Prebio    Route: Gastrostomy      Activity as tolerated     Medications:  Reconciled Home Medications:      Medication List      START taking these medications    calcitrioL 1 mcg/mL solution  Commonly known as: ROCALTROL  0.25 mLs (0.25 mcg total) by Per G Tube route once daily.  Start taking on: December 14, 2022     calcium carbonate 200 mg calcium (500 mg) chewable tablet  Commonly known as: TUMS  2 tablets (1,000 mg total) by Per G Tube route 5 (five) times daily.     famotidine 40 mg/5 mL (8 mg/mL) suspension  Commonly known as: PEPCID  2.5 mLs (20 mg total) by Per G Tube route 2 (two) times daily.  Start taking on: December 14, 2022     polyethylene glycol 17 gram/dose powder  Commonly known as: GLYCOLAX  Mix 1 capful (17 g) into liquid as directed on package and give by Per G Tube route once daily.  Start taking on: December 14, 2022          Time spent on the discharge of patient: 20 minutes    Henna Mckoy,  MD  Otorhinolaryngology-Head & Neck Surgery  [facility]

## 2022-12-13 NOTE — HOSPITAL COURSE
The patient is a 72 year old male with history of glottic SCC s/p TL in 1/2022, with development of BOT SCC s/p XRT  s/p total glossectomy, pharyngectomy, and ALT free flap and STSG reconstruction on 11/9. 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 This was treated with packing changes and has ultimately resolved. His course was complicated by sepsis and completed a course of IV antibiotics. He also had transaminitis on admission with a negative work-up. His liver enzymes have significantly improved by the time of discharge. He was transitioned to bolus tube feeds and was tolerating these for 24 hours at the time of discharge. Endocrinology was consulted for assistance with hyperglycemia and have recommend medication adjustments for discharge as well as close clinic follow up with his PCP. This has been ordered by the Endocrinology team for discharge.

## 2022-12-13 NOTE — SUBJECTIVE & OBJECTIVE
"Interval HPI:   Overnight events: No acute events overnight. Patient in room 1055/1055 A. Blood glucose variable. BG at and above goal on current insulin regimen (Schedule Insulin and SSI (TPN/TF)). Steroid use- None .      Renal function- Normal   Vasopressors-  None     No diet orders on file     Eating:   on TF  Nausea: No  Hypoglycemia and intervention: No  Fever: No  TPN and/or TF: Yes  If yes, type of TF/TPN and rate: Bolus TF 5 cartons per day    /68 (BP Location: Left arm, Patient Position: Lying)   Pulse 68   Temp 97.4 °F (36.3 °C) (Axillary)   Resp 18   Ht 5' 6" (1.676 m)   Wt 62.1 kg (137 lb)   SpO2 100%   BMI 22.11 kg/m²     Labs Reviewed and Include    Recent Labs   Lab 12/13/22  0348   *   CALCIUM 9.1   ALBUMIN 3.0*   PROT 6.9   *   K 4.5   CO2 24      BUN 19   CREATININE 0.8   ALKPHOS 826*   ALT 59*   AST 36   BILITOT 1.3*     Lab Results   Component Value Date    WBC 5.50 12/13/2022    HGB 9.9 (L) 12/13/2022    HCT 30.0 (L) 12/13/2022    MCV 93 12/13/2022     12/13/2022     No results for input(s): TSH, FREET4 in the last 168 hours.  Lab Results   Component Value Date    HGBA1C 5.8 11/22/2022       Nutritional status:   Body mass index is 22.11 kg/m².  Lab Results   Component Value Date    ALBUMIN 3.0 (L) 12/13/2022    ALBUMIN 2.6 (L) 12/12/2022    ALBUMIN 2.5 (L) 12/11/2022     No results found for: PREALBUMIN    Estimated Creatinine Clearance: 74.4 mL/min (based on SCr of 0.8 mg/dL).    Accu-Checks  Recent Labs     12/11/22  0612 12/11/22  1157 12/11/22  1510 12/11/22  1945 12/12/22  0417 12/12/22  0915 12/12/22  1254 12/12/22  1649 12/12/22  2120 12/13/22  0511   POCTGLUCOSE 271* 187* 241* 203* 214* 214* 179* 131* 209* 207*       Current Medications and/or Treatments Impacting Glycemic Control  Immunotherapy:    Immunosuppressants       None          Steroids:   Hormones (From admission, onward)      Start     Stop Route Frequency Ordered    12/10/22 1316  " melatonin tablet 6 mg         -- Oral Nightly PRN 12/10/22 1216          Pressors:    Autonomic Drugs (From admission, onward)      None          Hyperglycemia/Diabetes Medications:   Antihyperglycemics (From admission, onward)      Start     Stop Route Frequency Ordered    12/12/22 1830  insulin aspart U-100 pen 2 Units         -- SubQ Every 4 hours while awake 12/12/22 1729    12/12/22 1829  insulin aspart U-100 pen 1-10 Units         -- SubQ As needed (PRN) 12/12/22 1729

## 2022-12-13 NOTE — NURSING
Patient seen for wound care consultation.   Reviewed chart for this encounter.   See Flow Sheet for findings.    Pt seen prior to d/c to assess 'sacral pain' as stated in wound care consult order. Pt agreeable to care at this time. Sacrum w/ non-blanchable, intact skin.     RECOMMENDATIONS    Instructed pt to take home waffle mattress/pump, chair cushion. Gave pt Triad cream for home use and instructed on application.    Discussed POC with patient and primary RN.   See EMR for orders & patient education    Nursing to continue care. Wound care will sign off.

## 2022-12-13 NOTE — ASSESSMENT & PLAN NOTE
BG goal: 140-180     Sugars variable with some improvement.  Pt on bolus TF started yesterday.  W/ plans to go home on TF possibly tomorrow.  Pt reports previously did bolus feeds 9a-9p at home.  He is against going home on any type of scheduled insulin as his sugars were stable in the past, but will consider SSI to cover elevations.  Will monitor on regimen with adjustment to bolus TF.     - Continue Novolog 2 units q4 hr while pt on TF. Please hold if TF held or if BG under 100.  - Continue Novolog Moderate dose correction with ISF 25 starting at 150   - POCT Glucose every 4 hours  - Hypoglycemia protocol in place      ** Please notify Endocrine for any change and/or advance in diet**  ** Please call Endocrine for any BG related issues **     Discharge Planning:   - Continue previous regimen of Ozempic and Metformin   - Start correction scale Novolog every 4 hours during TF period as needed for high BG  Add correction scale if needed.  Blood sugar 150 to 200 add 2 units  Blood sugar 201 to 250 add 4 units  Blood sugar 251 to 300 add 6 units  Blood sugar 301 to 350 add 8 units  Blood sugar greater than 350 add 10 units  - Insurance approved diabetes testing supplies to check blood sugar 4 times a day (Before meals and at bedtime) and as needed.  - BD pen needles   - Insurance approved glucagon for extreme hypoglycemia (Baqsimi or Zegalogue if insurance approved)  - Pt notified to contact me with persistent high or persistent low blood sugars  - Will need close f/u w/ home provider or in discharge clinic.

## 2022-12-13 NOTE — RESPIRATORY THERAPY
RAPID RESPONSE RESPIRATORY THERAPY PROACTIVE NOTE           Time of visit: 923     Code Status: Full Code   : 1950  Bed: 1055/1055 A:   MRN: [id number]  Time spent at the bedside: < 15 min    SITUATION    Evaluated patient for: LDA Check     BACKGROUND    Patient has a past medical history of Hypothyroidism, PEG (percutaneous endoscopic gastrostomy) adjustment/replacement/removal, and Type 2 diabetes mellitus, without long-term current use of insulin.  Clinically Significant Surgical Hx: laryngectomy    24 Hours Vitals Range:  Temp:  [96.8 °F (36 °C)-97.6 °F (36.4 °C)]   Pulse:  [63-76]   Resp:  [16-18]   BP: (117-134)/(67-78)   SpO2:  [99 %-100 %]     Labs:    Recent Labs     22  0536 22  0438 22  0348   * 133* 133*   K 4.2 4.2 4.5    104 102   CO2 22* 21* 24   CREATININE 0.8 0.8 0.8   * 228* 152*        No results for input(s): PH, PCO2, PO2, HCO3, POCSATURATED, BE in the last 72 hours.    ASSESSMENT/INTERVENTIONS  Patient resting comfortably. No respiratory concerns at this time. Laryngectomy airway safety equipment bedside (6, 7, 8 ETT, peds mask, adult ambu bag, bracelet, signs).      Last VS   Temp: 97.4 °F (36.3 °C) (847)  Pulse: 68 (847)  Resp: 18 (847)  BP: 134/68 (847)  SpO2: 100 % (847)      Extra trachs at bedside: NA  Level of Consciousness: Level of Consciousness (AVPU): alert  Respiratory Effort: Respiratory Effort: Normal, Unlabored Expansion/Accessory Muscle Usage: Expansion/Accessory Muscles/Retractions: no retractions, no use of accessory muscles, expansion symmetric  All Lung Field Breath Sounds: All Lung Fields Breath Sounds: Anterior:, Lateral:, coarse, diminished  EVER Breath Sounds: diminished  RUL Breath Sounds: coarse  RML Breath Sounds: coarse  RLL Breath Sounds: coarse  O2 Device/Concentration: 5L/28%  Surgical airway: Laryngectomy  Ambu at bedside: Ambu bag with the patient?: Yes, Adult Ambu     Active Orders    Respiratory Care    Oxygen Continuous     Frequency: Continuous     Number of Occurrences: Until Specified     Order Questions:      Device type: Low flow      Device: Trach Collar      FiO2%: 28      Titrate O2 per Oxygen Titration Protocol: Yes      To maintain SpO2 goal of: >= 90%      Notify MD of: Inability to achieve desired SpO2; Sudden change in patient status and requires 20% increase in FiO2; Patient requires >60% FiO2    Routine tracheostomy care     Frequency: Q12H     Number of Occurrences: Until Specified    Tracheostomy replacement Once     Frequency: Once     Number of Occurrences: 1 Occurrences     Order Comments: Patient's laryngectomy stoma trach tube (6-0 Shiley cuffed) replace with new trach of same size 11/27  Please place additional 6-0 Shiley cuffed trach at bedside, MD to replace prn.         RECOMMENDATIONS    We recommend: RRT Recs: Continue POC per primary team.      FOLLOW-UP    Please call back the Rapid Response RT, Marcie Oleary RRT at x [phone] for any questions or concerns.

## 2022-12-13 NOTE — DISCHARGE INSTRUCTIONS
You should not eat anything by mouth. Continue your tube feeds - 5 cans per day with 100 mL of water before and after each can. If you feel full or reflux, you can decrease the amount you give at each time. Try to take in a total of 5 cans every day to keep your nutrition up.    Wound care:  - You can wash your incisions with warm, soapy water.   - Pat the incisions dry and leave them open to air  - You can apply Aquaphor or Vaseline ointment if your skin feels dry around the incisions    You should keep the trach tube in your stoma until we tell you otherwise in clinic. Please call the clinic if you have difficulty with any trach supplies.

## 2022-12-13 NOTE — PROGRESS NOTES
[facility] - [facility]  Otorhinolaryngology-Head & Neck Surgery  Progress Note    Subjective:     Post-Op Info:  * No surgery found *      Hospital Day: 21     Interval History: NAEON. Tolerating bolus feeds.     Medications:  Continuous Infusions:  Scheduled Meds:   calcitrioL  0.25 mcg Per G Tube Daily    calcium carbonate  1,000 mg Per G Tube 5x Daily    enoxaparin  40 mg Subcutaneous Daily    insulin aspart U-100  2 Units Subcutaneous Q4H While awake    pantoprazole  40 mg Per G Tube Daily    polyethylene glycol  17 g Per G Tube Daily    sodium chloride 0.9%  10 mL Intravenous Q6H     PRN Meds:acetaminophen, dextrose 10%, dextrose 10%, glucagon (human recombinant), hydrALAZINE, insulin aspart U-100, melatonin, ondansetron, oxyCODONE, promethazine (PHENERGAN) IVPB, senna-docusate 8.6-50 mg, sodium chloride 0.9%, Flushing PICC Protocol **AND** sodium chloride 0.9% **AND** sodium chloride 0.9%, traZODone     Review of patient's allergies indicates:   Allergen Reactions    Lovastatin Itching     Objective:     Vital Signs (24h Range):  Temp:  [96.8 °F (36 °C)-97.6 °F (36.4 °C)] 97 °F (36.1 °C)  Pulse:  [63-76] 69  Resp:  [16-20] 16  SpO2:  [98 %-100 %] 99 %  BP: (117-129)/(67-78) 129/72       Lines/Drains/Airways       Peripherally Inserted Central Catheter Line  Duration             PICC Double Lumen 12/01/22 1017 right brachial 11 days              Drain  Duration                  Gastrostomy/Enterostomy LUQ -- days              Airway  Duration             Adult Surgical Airway Shiley Cuffed -- days         Laryngectomy (Do Not Remove) 11/09/22 Laryngectomy stoma 34 days                    Physical Exam  NAD  Awake and alert  Mild facial edema, stable  Trach within stoma, removed. Stoma appears healthy  Skin graft to midline neck overlying flap  Small area of dehiscence left laterally, near area of STSG, no drainage  Intraoral flap is soft without fluctuance, secretions thin, clear intraorally    Significant  "Labs:  CBC:   Recent Labs   Lab 12/13/22  0348   WBC 5.50   RBC 3.24*   HGB 9.9*   HCT 30.0*      MCV 93   MCH 30.6   MCHC 33.0     CMP:   Recent Labs   Lab 12/13/22  0348   *   CALCIUM 9.1   ALBUMIN 3.0*   PROT 6.9   *   K 4.5   CO2 24      BUN 19   CREATININE 0.8   ALKPHOS 826*   ALT 59*   AST 36   BILITOT 1.3*       Significant Diagnostics:  None    Assessment/Plan:     Laryngeal cancer  72 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    Admitted for bacteremia and fungemia, port removed and ID recommending IV antibiotics at home. PICC placed 12/1. Evidence of fistula on L neck with drainage, managed with BID packing changes, completed as tract appears dry without drainage despite resuming TF     ENT/HNS:  - Laryngectomy protocol   -Sign above bed + outside door stating patient is "neck breather" and "can not be intubated by mouth"   -Clean laryngectomy stoma as needed, or daily  - Keep tracheostomy in place     - OK to replace tracheostomy if dislodged, only in place for swelling/secretion assistance -- replaced 11/27  - Humidified O2 via trach collar  - CT with fluid collection, will continue packing changes though with minimal output/packing -- packing changes discontinued; CTM     Neuro:   - Pain: Multimodality PRN regimen initiated     Cardiac:  - HDS  - H/O of paroxysmal a fib, will CTM      Pulmonary:   - PRN breathing treatments  - Humidified air per trach collar  - OK to remove tracheostomy for adequate suctioning     Renal:   - monitor Is and Os  - Cr stable     Infectious Disease:  - Leukocytosis and BC positive for pseudomonas -- now improved WBC  - ID consulted; appreciate recommendations   - TTE unremarkable    - Antibiotics per ID team; to end 12/11; course completed   Antibiotics (From admission, onward)    Start     Stop Route Frequency Ordered "    11/24/22 0900  mupirocin 2 % ointment         11/29 0859 Nasl 2 times daily 11/24/22 0319         Hematology/Oncology:  - H/H stable  - Lovenox DVT prophylaxis      FEN/GI  - Elevated BG to 200s, consult to endocrine regarding home recs, appreciate assistance   - Continue bolus feeds   - Transaminitis and increased alk phos improved   - U/S of abdomen + liver dopppler     -- biliary sludge, otherwise unremarkable; doppler wnl   - Medicine consulted; appreciate recs     - CRP, ESR elevated     - Autoimmune workup negative      - Hepatitis screen negative   - Postoperative hypoparathyroidism   - Hypocalcemia on initial admission     - Continue tums QID, calcitriol   - Replace electrolytes as needed to keep K>4, Mg>2 --repleted K+ 11/27  - Bowel reg  - Protonix for GI prophylaxis, GERD  - STRICT nausea control ondansetron, phenergan        MSK  - Out of bed as tolerated  - STSG donor site healed     Dispo:  - Discharge pending tolerance of bolus tube feeds          Jo Ann Bryant MD  Otorhinolaryngology-Head & Neck Surgery  [facility]

## 2022-12-13 NOTE — PLAN OF CARE
Pt aox4 on RA, patient demonstrates understanding of POC.    Problem: Adult Inpatient Plan of Care  Goal: Plan of Care Review  Outcome: Ongoing, Progressing  Goal: Patient-Specific Goal (Individualized)  Outcome: Ongoing, Progressing  Goal: Absence of Hospital-Acquired Illness or Injury  Outcome: Ongoing, Progressing  Goal: Optimal Comfort and Wellbeing  Outcome: Ongoing, Progressing  Goal: Readiness for Transition of Care  Outcome: Ongoing, Progressing     Problem: Diabetes Comorbidity  Goal: Blood Glucose Level Within Targeted Range  Outcome: Ongoing, Progressing     Problem: Infection  Goal: Absence of Infection Signs and Symptoms  Outcome: Ongoing, Progressing     Problem: Impaired Wound Healing  Goal: Optimal Wound Healing  Outcome: Ongoing, Progressing     Problem: Fall Injury Risk  Goal: Absence of Fall and Fall-Related Injury  Outcome: Ongoing, Progressing     Problem: Skin Injury Risk Increased  Goal: Skin Health and Integrity  Outcome: Ongoing, Progressing     Problem: Pain Acute  Goal: Acceptable Pain Control and Functional Ability  Outcome: Ongoing, Progressing

## 2022-12-14 ENCOUNTER — PATIENT MESSAGE (OUTPATIENT)
Dept: SURGICAL ONCOLOGY | Facility: CLINIC | Age: 72
End: 2022-12-14
Payer: MEDICARE

## 2022-12-14 NOTE — PHYSICIAN QUERY
PT Name: Cyrus Batres Jr.  MR #: [id number]     DOCUMENTATION CLARIFICATION     CDS/: Ana ABREU, RN             Contact information: [email or web address]  This form is a permanent document in the medical record.     Query Date: December 14, 2022    By submitting this query, we are merely seeking further clarification of documentation.  Please utilize your independent clinical judgment when addressing the question(s) below.  The Medical Record contains the following:  Indicators Supporting Clinical Findings Location in Medical Record   x HR         RR          BP        Temp afebrile H&P 11/23/22   x Lactic Acid          Procalcitonin Lactate, Charlie = 0.7 Lab 11/23/22   x WBC           Bands          CRP WBC 8.43 H&P 11/23/22   x Culture(s) Pt presented to [place] ED for malaise and persistent cough yesterday, and he was found to have elevated white count and blood culture positive for pseudomonas. H&P 11/23/22    AMS, Confusion, LOC, etc.      Organ Dysfunction/Failure     x Bacteremia or Sepsis / Septic Bacteremia  See fungemia     Fungemia  Cglab isolated from OSH - suspect from fluid collection seen on CT from OSH.  ID consult 11/26/22   x Known or Suspected Source of Infection documented Also noted to have Pseudomonas bacteremia as well as pseudomonas isolated from trach aspirate. ID consult 11/26/22    (Failed) Outpatient Treatment      Medication     x Treatment -port removal given fungemia - d/w pt, pt amenable         -once port removed, recommend 2 sets of blcx  -repeat blcx to document cleareance   -continue micafungin, vanc and meropenem (1 isolate with cefepime BENI to 4). 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 This was treated with packing changes and has ultimately resolved. His course was complicated by sepsis and completed a  course of IV antibiotics. ENT discharge summary 12/13/22        Due to conflicting documentation, please specify the diagnosis associated with above clinical findings. Thank you.    [   ]  Sepsis Ruled Out, Bacteremia due to Pseudomonas     [   ]  Sepsis Ruled Out, Bacteremia due to Candida     [  x ]  Sepsis Ruled Out, Bacteremia due to Pseudomonas and Candida     [   ]  Sepsis Ruled In (please camilo all that apply to specify organism)    [   ] Pseudomonas    [   ] Candida    [   ] Other (please specify): __________________________________     [   ]  Other Infectious Disease (please specify): __________     [  ]  Clinically Undetermined     Please document in your progress notes daily for the duration of treatment until resolved and include in your discharge summary.

## 2022-12-16 ENCOUNTER — OFFICE VISIT (OUTPATIENT)
Dept: INFECTIOUS DISEASES | Facility: CLINIC | Age: 72
End: 2022-12-16
Payer: MEDICARE

## 2022-12-16 VITALS
WEIGHT: 130.31 LBS | BODY MASS INDEX: 20.94 KG/M2 | SYSTOLIC BLOOD PRESSURE: 94 MMHG | HEIGHT: 66 IN | DIASTOLIC BLOOD PRESSURE: 54 MMHG | HEART RATE: 71 BPM

## 2022-12-16 DIAGNOSIS — Z79.4 TYPE 2 DIABETES MELLITUS WITH DIABETIC NEUROPATHIC ARTHROPATHY, WITH LONG-TERM CURRENT USE OF INSULIN: Chronic | ICD-10-CM

## 2022-12-16 DIAGNOSIS — I10 BENIGN HYPERTENSION: Chronic | ICD-10-CM

## 2022-12-16 DIAGNOSIS — A49.8 PSEUDOMONAS AERUGINOSA INFECTION: Primary | ICD-10-CM

## 2022-12-16 DIAGNOSIS — Z90.02 S/P LARYNGECTOMY: ICD-10-CM

## 2022-12-16 DIAGNOSIS — C32.9 LARYNX CANCER: ICD-10-CM

## 2022-12-16 DIAGNOSIS — T81.42XA DEEP POSTOPERATIVE WOUND INFECTION: ICD-10-CM

## 2022-12-16 DIAGNOSIS — E11.610 TYPE 2 DIABETES MELLITUS WITH DIABETIC NEUROPATHIC ARTHROPATHY, WITH LONG-TERM CURRENT USE OF INSULIN: Chronic | ICD-10-CM

## 2022-12-16 PROBLEM — T81.44XA SEPSIS FOLLOWING PROCEDURE: Status: RESOLVED | Noted: 2022-11-23 | Resolved: 2022-12-16

## 2022-12-16 PROBLEM — R06.02 SOB (SHORTNESS OF BREATH): Status: RESOLVED | Noted: 2022-11-22 | Resolved: 2022-12-16

## 2022-12-16 PROBLEM — R04.2 HEMOPTYSIS: Status: RESOLVED | Noted: 2021-12-25 | Resolved: 2022-12-16

## 2022-12-16 PROBLEM — L02.11 ABSCESS OF NECK: Status: RESOLVED | Noted: 2022-11-23 | Resolved: 2022-12-16

## 2022-12-16 PROBLEM — T81.31XA WOUND DEHISCENCE, SURGICAL, INITIAL ENCOUNTER: Status: RESOLVED | Noted: 2022-11-23 | Resolved: 2022-12-16

## 2022-12-16 PROCEDURE — 3074F SYST BP LT 130 MM HG: CPT | Mod: CPTII,S$GLB,, | Performed by: INTERNAL MEDICINE

## 2022-12-16 PROCEDURE — 1111F PR DISCHARGE MEDS RECONCILED W/ CURRENT OUTPATIENT MED LIST: ICD-10-PCS | Mod: CPTII,S$GLB,, | Performed by: INTERNAL MEDICINE

## 2022-12-16 PROCEDURE — 3008F BODY MASS INDEX DOCD: CPT | Mod: CPTII,S$GLB,, | Performed by: INTERNAL MEDICINE

## 2022-12-16 PROCEDURE — 3044F HG A1C LEVEL LT 7.0%: CPT | Mod: CPTII,S$GLB,, | Performed by: INTERNAL MEDICINE

## 2022-12-16 PROCEDURE — 3078F PR MOST RECENT DIASTOLIC BLOOD PRESSURE < 80 MM HG: ICD-10-PCS | Mod: CPTII,S$GLB,, | Performed by: INTERNAL MEDICINE

## 2022-12-16 PROCEDURE — 3008F PR BODY MASS INDEX (BMI) DOCUMENTED: ICD-10-PCS | Mod: CPTII,S$GLB,, | Performed by: INTERNAL MEDICINE

## 2022-12-16 PROCEDURE — 3078F DIAST BP <80 MM HG: CPT | Mod: CPTII,S$GLB,, | Performed by: INTERNAL MEDICINE

## 2022-12-16 PROCEDURE — 1160F RVW MEDS BY RX/DR IN RCRD: CPT | Mod: CPTII,S$GLB,, | Performed by: INTERNAL MEDICINE

## 2022-12-16 PROCEDURE — 99999 PR PBB SHADOW E&M-EST. PATIENT-LVL IV: CPT | Mod: PBBFAC,,, | Performed by: INTERNAL MEDICINE

## 2022-12-16 PROCEDURE — 3044F PR MOST RECENT HEMOGLOBIN A1C LEVEL <7.0%: ICD-10-PCS | Mod: CPTII,S$GLB,, | Performed by: INTERNAL MEDICINE

## 2022-12-16 PROCEDURE — 1125F PR PAIN SEVERITY QUANTIFIED, PAIN PRESENT: ICD-10-PCS | Mod: CPTII,S$GLB,, | Performed by: INTERNAL MEDICINE

## 2022-12-16 PROCEDURE — 3066F PR DOCUMENTATION OF TREATMENT FOR NEPHROPATHY: ICD-10-PCS | Mod: CPTII,S$GLB,, | Performed by: INTERNAL MEDICINE

## 2022-12-16 PROCEDURE — 4010F PR ACE/ARB THEARPY RXD/TAKEN: ICD-10-PCS | Mod: CPTII,S$GLB,, | Performed by: INTERNAL MEDICINE

## 2022-12-16 PROCEDURE — 1101F PR PT FALLS ASSESS DOC 0-1 FALLS W/OUT INJ PAST YR: ICD-10-PCS | Mod: CPTII,S$GLB,, | Performed by: INTERNAL MEDICINE

## 2022-12-16 PROCEDURE — 1111F DSCHRG MED/CURRENT MED MERGE: CPT | Mod: CPTII,S$GLB,, | Performed by: INTERNAL MEDICINE

## 2022-12-16 PROCEDURE — 1125F AMNT PAIN NOTED PAIN PRSNT: CPT | Mod: CPTII,S$GLB,, | Performed by: INTERNAL MEDICINE

## 2022-12-16 PROCEDURE — 3060F PR POS MICROALBUMINURIA RESULT DOCUMENTED/REVIEW: ICD-10-PCS | Mod: CPTII,S$GLB,, | Performed by: INTERNAL MEDICINE

## 2022-12-16 PROCEDURE — 3066F NEPHROPATHY DOC TX: CPT | Mod: CPTII,S$GLB,, | Performed by: INTERNAL MEDICINE

## 2022-12-16 PROCEDURE — 3288F PR FALLS RISK ASSESSMENT DOCUMENTED: ICD-10-PCS | Mod: CPTII,S$GLB,, | Performed by: INTERNAL MEDICINE

## 2022-12-16 PROCEDURE — 3060F POS MICROALBUMINURIA REV: CPT | Mod: CPTII,S$GLB,, | Performed by: INTERNAL MEDICINE

## 2022-12-16 PROCEDURE — 4010F ACE/ARB THERAPY RXD/TAKEN: CPT | Mod: CPTII,S$GLB,, | Performed by: INTERNAL MEDICINE

## 2022-12-16 PROCEDURE — 1160F PR REVIEW ALL MEDS BY PRESCRIBER/CLIN PHARMACIST DOCUMENTED: ICD-10-PCS | Mod: CPTII,S$GLB,, | Performed by: INTERNAL MEDICINE

## 2022-12-16 PROCEDURE — 99215 OFFICE O/P EST HI 40 MIN: CPT | Mod: S$GLB,,, | Performed by: INTERNAL MEDICINE

## 2022-12-16 PROCEDURE — 3074F PR MOST RECENT SYSTOLIC BLOOD PRESSURE < 130 MM HG: ICD-10-PCS | Mod: CPTII,S$GLB,, | Performed by: INTERNAL MEDICINE

## 2022-12-16 PROCEDURE — 1159F PR MEDICATION LIST DOCUMENTED IN MEDICAL RECORD: ICD-10-PCS | Mod: CPTII,S$GLB,, | Performed by: INTERNAL MEDICINE

## 2022-12-16 PROCEDURE — 99215 PR OFFICE/OUTPT VISIT, EST, LEVL V, 40-54 MIN: ICD-10-PCS | Mod: S$GLB,,, | Performed by: INTERNAL MEDICINE

## 2022-12-16 PROCEDURE — 1101F PT FALLS ASSESS-DOCD LE1/YR: CPT | Mod: CPTII,S$GLB,, | Performed by: INTERNAL MEDICINE

## 2022-12-16 PROCEDURE — 1159F MED LIST DOCD IN RCRD: CPT | Mod: CPTII,S$GLB,, | Performed by: INTERNAL MEDICINE

## 2022-12-16 PROCEDURE — 99999 PR PBB SHADOW E&M-EST. PATIENT-LVL IV: ICD-10-PCS | Mod: PBBFAC,,, | Performed by: INTERNAL MEDICINE

## 2022-12-16 PROCEDURE — 3288F FALL RISK ASSESSMENT DOCD: CPT | Mod: CPTII,S$GLB,, | Performed by: INTERNAL MEDICINE

## 2022-12-16 NOTE — PROGRESS NOTES
Subjective:      Patient ID: Cyrus Batres Jr. is a 72 y.o. male.    Chief Complaint:Hospital Follow Up      History of Present Illness    {ID HPI BLOCKS:19364}    Review of Systems   Constitutional: Negative for chills, decreased appetite, fever, malaise/fatigue, night sweats, weight gain and weight loss.   HENT:  Negative for congestion, ear pain, hearing loss, hoarse voice, sore throat and tinnitus.    Eyes:  Negative for blurred vision, redness and visual disturbance.   Cardiovascular:  Negative for chest pain, leg swelling and palpitations.   Respiratory:  Negative for cough, hemoptysis, shortness of breath, sputum production and wheezing.    Hematologic/Lymphatic: Negative for adenopathy. Does not bruise/bleed easily.   Skin:  Negative for dry skin, itching, rash and suspicious lesions.   Musculoskeletal:  Negative for back pain, joint pain, myalgias and neck pain.   Gastrointestinal:  Negative for abdominal pain, constipation, diarrhea, heartburn, nausea and vomiting.   Genitourinary:  Negative for dysuria, flank pain, frequency, hematuria, hesitancy and urgency.   Neurological:  Negative for dizziness, headaches, numbness, paresthesias and weakness.   Psychiatric/Behavioral:  Negative for depression and memory loss. The patient does not have insomnia and is not nervous/anxious.    Objective:   Physical Exam  Assessment:       No diagnosis found.      Plan:       ***

## 2022-12-16 NOTE — PROGRESS NOTES
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 Patient completed course of cefepime, vanc, micafungin on 12/11/2022 - incision healing and patient feeling well.    Review of Systems   Constitutional:  Negative for chills, diaphoresis, fever and weight loss.   HENT:  Negative for congestion, sinus pain and sore throat.         Neck tightness   Eyes:  Negative for photophobia and pain.   Respiratory:  Negative for cough, sputum production and shortness of breath.    Cardiovascular:  Negative for chest pain and leg swelling.   Gastrointestinal:  Negative for abdominal pain, diarrhea, nausea and vomiting.   Genitourinary:  Negative for dysuria and hematuria.   Musculoskeletal:  Negative for joint pain.   Skin:  Negative for rash.   Neurological:  Negative for focal weakness and headaches.   Psychiatric/Behavioral:  Negative for depression. The patient is not nervous/anxious.        Objective:   Physical Exam  Constitutional:       General: He is not in acute distress.     Appearance: He is well-developed. He is not diaphoretic.   HENT:      Head: Normocephalic and atraumatic.   Eyes:      Conjunctiva/sclera: Conjunctivae normal.   Neck:      Comments: Trach collar in place. Incision healed, staples remain. No surrounding redness, swelling, drainage  Pulmonary:      Effort: Pulmonary effort is normal. No respiratory distress.   Abdominal:      General: There is no distension.      Palpations: Abdomen is soft.      Comments: PEG tube   Musculoskeletal:         General: Normal range of motion.   Skin:     General: Skin is warm and dry.      Findings: No  erythema or rash.      Comments: Prior left chest port site with no redness, swelling, pain   Neurological:      Mental Status: He is alert and oriented to person, place, and time.   Psychiatric:         Behavior: Behavior normal.         Significant results reviewed:    Sodium   Date Value Ref Range Status   12/13/2022 133 (L) 136 - 145 mmol/L Final   12/12/2022 133 (L) 136 - 145 mmol/L Final   12/11/2022 133 (L) 136 - 145 mmol/L Final      Potassium   Date Value Ref Range Status   12/13/2022 4.5 3.5 - 5.1 mmol/L Final   12/12/2022 4.2 3.5 - 5.1 mmol/L Final   12/11/2022 4.2 3.5 - 5.1 mmol/L Final      Chloride   Date Value Ref Range Status   12/13/2022 102 95 - 110 mmol/L Final   12/12/2022 104 95 - 110 mmol/L Final   12/11/2022 105 95 - 110 mmol/L Final      CO2   Date Value Ref Range Status   12/13/2022 24 23 - 29 mmol/L Final   12/12/2022 21 (L) 23 - 29 mmol/L Final   12/11/2022 22 (L) 23 - 29 mmol/L Final      BUN   Date Value Ref Range Status   12/13/2022 19 8 - 23 mg/dL Final   12/12/2022 18 8 - 23 mg/dL Final   12/11/2022 16 8 - 23 mg/dL Final      Creatinine   Date Value Ref Range Status   12/13/2022 0.8 0.5 - 1.4 mg/dL Final   12/12/2022 0.8 0.5 - 1.4 mg/dL Final   12/11/2022 0.8 0.5 - 1.4 mg/dL Final      Glucose   Date Value Ref Range Status   12/13/2022 152 (H) 70 - 110 mg/dL Final   12/12/2022 228 (H) 70 - 110 mg/dL Final   12/11/2022 259 (H) 70 - 110 mg/dL Final       ALT   Date Value Ref Range Status   12/13/2022 59 (H) 10 - 44 U/L Final   12/12/2022 57 (H) 10 - 44 U/L Final   12/11/2022 52 (H) 10 - 44 U/L Final      AST   Date Value Ref Range Status   12/13/2022 36 10 - 40 U/L Final   12/12/2022 36 10 - 40 U/L Final   12/11/2022 27 10 - 40 U/L Final      Total Bilirubin   Date Value Ref Range Status   12/13/2022 1.3 (H) 0.1 - 1.0 mg/dL Final     Comment:     For infants and newborns, interpretation of results should be based  on gestational age, weight and in agreement with  clinical  observations.    Premature Infant recommended reference ranges:  Up to 24 hours.............<8.0 mg/dL  Up to 48 hours............<12.0 mg/dL  3-5 days..................<15.0 mg/dL  6-29 days.................<15.0 mg/dL     12/12/2022 1.3 (H) 0.1 - 1.0 mg/dL Final     Comment:     For infants and newborns, interpretation of results should be based  on gestational age, weight and in agreement with clinical  observations.    Premature Infant recommended reference ranges:  Up to 24 hours.............<8.0 mg/dL  Up to 48 hours............<12.0 mg/dL  3-5 days..................<15.0 mg/dL  6-29 days.................<15.0 mg/dL     12/11/2022 1.2 (H) 0.1 - 1.0 mg/dL Final     Comment:     For infants and newborns, interpretation of results should be based  on gestational age, weight and in agreement with clinical  observations.    Premature Infant recommended reference ranges:  Up to 24 hours.............<8.0 mg/dL  Up to 48 hours............<12.0 mg/dL  3-5 days..................<15.0 mg/dL  6-29 days.................<15.0 mg/dL        Albumin   Date Value Ref Range Status   12/13/2022 3.0 (L) 3.5 - 5.2 g/dL Final   12/12/2022 2.6 (L) 3.5 - 5.2 g/dL Final   12/11/2022 2.5 (L) 3.5 - 5.2 g/dL Final   11/18/2020 3.7 3.6 - 5.1 g/dL Final     Comment:     For additional information, please refer to   http://education.[employer]/faq/WKX479 (This link is   being provided for informational/ educational purposes only.)  This test was developed and its analytical performance   characteristics have been determined by [facility]. It has not been cleared or approved by the   US Food and Drug Administration. This assay has been validated   pursuant to the CLIA regulations and is used for clinical   purposes.  @ Test Performed By:  Max [facility]umpus [facility]  Yo Cortes M.D.,   [address] [place] [id number]  CLIA  [id number]      02/13/2020 4.5 3.6 - 5.1 g/dL Final     Comment:     For additional information, please refer to   http://[employer]/faq/GMI262 (This link is   being provided for informational/ educational purposes only.)  This test was developed and its analytical performance   characteristics have been determined by [facility]. It has not been cleared or approved by the [place]  Food and Drug Administration. This assay has been validated   pursuant to the CLIA regulations and is used for clinical   purposes.  @ Test Performed By:  [facility]  Gallito Gomez M.D., Ph.D.,   [address] [place] [id number]  CLIA  [id number]     02/04/2020 4.3 3.6 - 5.1 g/dL Final     Comment:     For additional information, please refer to   http://[employer]/faq/XJG045 (This link is   being provided for informational/ educational purposes only.)  This test was developed and its analytical performance   characteristics have been determined by [employer]  [facility]. It has not been cleared or approved by the [place]  Food and Drug Administration. This assay has been validated   pursuant to the CLIA regulations and is used for clinical   purposes.  @ Test Performed By:  [employer] [place]  Gallito Gomez M.D., Ph.D.,   [address] [place], [place] [id number]  CLIA  [id number]        Total Protein   Date Value Ref Range Status   12/13/2022 6.9 6.0 - 8.4 g/dL Final   12/12/2022 6.2 6.0 - 8.4 g/dL Final   12/11/2022 6.0 6.0 - 8.4 g/dL Final      Alkaline Phosphatase   Date Value Ref Range Status   12/13/2022 826 (H) 55 - 135 U/L Final   12/12/2022 745 (H) 55 - 135 U/L Final   12/11/2022 742 (H) 55 - 135 U/L Final        WBC   Date Value Ref Range Status   12/13/2022 5.50 3.90 - 12.70 K/uL Final   12/12/2022 4.32 3.90 - 12.70 K/uL Final   12/11/2022 3.75 (L) 3.90 -  12.70 K/uL Final      Hemoglobin   Date Value Ref Range Status   12/13/2022 9.9 (L) 14.0 - 18.0 g/dL Final   12/12/2022 9.1 (L) 14.0 - 18.0 g/dL Final   12/11/2022 8.9 (L) 14.0 - 18.0 g/dL Final      POC Hematocrit   Date Value Ref Range Status   11/09/2022 25 (L) 36 - 54 %PCV Final   11/09/2022 24 (L) 36 - 54 %PCV Final     Hematocrit   Date Value Ref Range Status   12/13/2022 30.0 (L) 40.0 - 54.0 % Final   12/12/2022 28.4 (L) 40.0 - 54.0 % Final   12/11/2022 27.5 (L) 40.0 - 54.0 % Final      Platelets   Date Value Ref Range Status   12/13/2022 243 150 - 450 K/uL Final   12/12/2022 185 150 - 450 K/uL Final   12/11/2022 169 150 - 450 K/uL Final        Blood cultures   11/22/2022 Pseudomonas aeruginosa, C glabrata  11/23/2922 Staph epi    Tracheal aspirate culture  11/22/2022 Pseudomonas aeruginosa    11/25/2022 TTE  The left ventricle is normal in size with normal systolic function.  The estimated ejection fraction is 65%.  Normal left ventricular diastolic function.  Normal right ventricular size with normal right ventricular systolic function.  Mild-to-moderate mitral regurgitation.  Mild pulmonic regurgitation.  Normal central venous pressure (3 mmHg).  The estimated PA systolic pressure is 21 mmHg.  Trivial posterior pericardial effusion. Just at base    11/29/2022 CT soft tissue  1. Postsurgical change as described above.  Persistent inflammatory stranding and multifocal fluid collection as above.  Nonspecific in the early postoperative setting, but infection/abscess can not be excluded.  Decreased size of irregular complex fluid collection superior to the tracheostomy tube, but the remainder of the fluid collections are similar in size.  2. Interval removal of left chest wall port, now with small air-fluid collection measuring up to 2.5 cm at that level.  3. Additional stable findings as above.    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 Patient completed course of cefepime, vanc, micafungin on 12/11/2022 - incision healing and patient feeling well.    Patient noted to have low blood pressure on triage vitals, but otherwise asymptomatic.    - Prior notes by Haleigh Brito and Jessica reviewed  - Results reviewed as above      Plan:   - No need for any additional antibiotics  - Okay to replace port from ID standpoint  - Recommended patient hold losartan tonight given low BP, follow-up with PCP    60 minutes of total time spent on the encounter, which includes face to face time and non-face to face time preparing to see the patient (eg, review of tests), Obtaining and/or reviewing separately obtained history, Documenting clinical information in the electronic or other health record, Independently interpreting results (not separately reported) and communicating results to the patient/family/caregiver, or Care coordination (not separately reported).       Caro De Jesus MD MPH  [facility]

## 2022-12-17 DIAGNOSIS — C32.9 LARYNX CANCER: Primary | ICD-10-CM

## 2022-12-20 ENCOUNTER — PATIENT MESSAGE (OUTPATIENT)
Dept: SURGICAL ONCOLOGY | Facility: CLINIC | Age: 72
End: 2022-12-20
Payer: MEDICARE

## 2022-12-20 ENCOUNTER — PATIENT MESSAGE (OUTPATIENT)
Dept: SPEECH THERAPY | Facility: HOSPITAL | Age: 72
End: 2022-12-20
Payer: MEDICARE

## 2022-12-20 NOTE — PHYSICIAN QUERY
PT Name: Cyrus Batres Jr.  MR #: [id number]     Documentation Clarification      CDS/: Ana ABREU, RN             Contact information: [email or web address]    This form is a permanent document in the medical record.     Query Date: December 20, 2022    By submitting this query, we are merely seeking further clarification of documentation. Please utilize your independent clinical judgment when addressing the question(s) below.    The Medical Record reflects the following:    Supporting Clinical Findings Location in Medical Record      Hospital Course: The patient is a 72 year old male with history of glottic SCC s/p TL in 1/2022, with development of BOT SCC s/p XRT  s/p total glossectomy, pharyngectomy, and ALT free flap and STSG reconstruction on 11/9. 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      Fungemia     Presented this admission for worsening SOB,  Found to have Staph epidermidis bacteremia, Pseudomonas bacteremia (found on both blood and tracheal aspirate culture), as well as Candida glabrata fungemia isolated from OSH.  Suspect sources are both port and from tracheostomy site.      1) Infection:   Candida glabrata fungemia  Pseudomonas aeruginosa bacteremia  Staphylococcus epidermidis bacteremia      ENT Discharge Summary 12/13/22         Infectious Disease Progress 11/29/22      Blood Culture = PSEUDOMONAS AERUGINOSA    Blood Culture = OSCAR GLABRATA      Respiratory Culture = PSEUDOMONAS AERUGINOSA few     Aerobic Bacterial Culture = PSEUDOMONAS AERUGINOSA moderate     Blood Culture = PSEUDOMONAS AERUGINOSA        Microbiology 11/22/22 05:57       Microbiology 11/22/22 21:53     Microbiology 11/22/22 22:23     Microbiology 11/23/22 09:43     Please document your best medical opinion on the etiology of bacteremia:     [   ]  Postoperative infection at trach site      [   ]  Postoperative infection at flap site dehiscence      [  x   ]  Other etiology:central line and  tracheal tube site     [   ]  Other clarification (please specify): ____________     [    ]  Clinically undetermined

## 2022-12-21 ENCOUNTER — PATIENT MESSAGE (OUTPATIENT)
Dept: ENDOCRINOLOGY | Facility: CLINIC | Age: 72
End: 2022-12-21
Payer: MEDICARE

## 2022-12-21 ENCOUNTER — TELEPHONE (OUTPATIENT)
Dept: HEMATOLOGY/ONCOLOGY | Facility: CLINIC | Age: 72
End: 2022-12-21

## 2022-12-21 DIAGNOSIS — E11.65 TYPE 2 DIABETES MELLITUS WITH HYPERGLYCEMIA, WITH LONG-TERM CURRENT USE OF INSULIN: Primary | ICD-10-CM

## 2022-12-21 DIAGNOSIS — R63.4 WEIGHT LOSS: ICD-10-CM

## 2022-12-21 DIAGNOSIS — Z79.4 TYPE 2 DIABETES MELLITUS WITH HYPERGLYCEMIA, WITH LONG-TERM CURRENT USE OF INSULIN: Primary | ICD-10-CM

## 2022-12-21 DIAGNOSIS — R13.14 DYSPHAGIA, PHARYNGOESOPHAGEAL: Primary | ICD-10-CM

## 2022-12-21 DIAGNOSIS — C32.9 LARYNX CANCER: ICD-10-CM

## 2022-12-21 NOTE — TELEPHONE ENCOUNTER
Patient's wife, Janel, called reported that patient has a PEG tube and has had nausea x 3 days. Approximately 1100 yesterday, 12/20, patient took a bolus feed via PEG tube (1 bottle, per wife) and at 1500 on 12/20, checked his residuals and got a full syringe of residual back. Patient has also been experiencing diarrhea but wife reports she does not know how much. Janel also reports that this had happened before with Glucerna. Janel also reports that she had messaged Dr. James's office regarding patient's PEG feedings and had not gotten a response. Reviewed recent messages with SUDARSHAN Rodriguez, and per her verbal order, wife to follow up with Dr James's office as per their notes they have a plan for the patient's tube feedings. Reviewed above with wife and she verbalized understanding.

## 2022-12-21 NOTE — PROGRESS NOTES
Patient having difficulty tolerating tube feeds since returning home from the hospital. Recommend he attempt his previous home regimen of Jevity 1.5 and a trial of reglan to help with nausea and gastric emptying.  If continuous feeding preferred, recommend [employer] 1.5 @80mL/hr x16 hours. 100mL water flush every 2 hours. This may be tolerated better due to recent antibiotic use. Orders attached.

## 2022-12-22 ENCOUNTER — PATIENT MESSAGE (OUTPATIENT)
Dept: ENDOCRINOLOGY | Facility: CLINIC | Age: 72
End: 2022-12-22
Payer: MEDICARE

## 2022-12-26 ENCOUNTER — PATIENT MESSAGE (OUTPATIENT)
Dept: SURGICAL ONCOLOGY | Facility: CLINIC | Age: 72
End: 2022-12-26
Payer: MEDICARE

## 2022-12-26 NOTE — PROGRESS NOTES
[facility] Hematology/Oncology  PROGRESS NOTE -  Follow-up Visit      Subjective:       Patient ID:   NAME: Cyrus Batres Jr. : 1950     72 y.o. 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  The patient is here today to go over the results of the recently ordered labs, tests and studies. He is here with his wife today       He previously saw Dr Lucero with Rad/onc, and Dr James and his case was presented to H&N Tumor Board at [facility] and  he general consensus was to proceed to surgery.    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 He had his portacath removed on .     He is breathing ok. No HA's or CP; no pain at this time        Discussed covid19 and he has been vaccinated      ROS:   GEN: normal without any fever, night sweats or weight loss; he has gained some weight  HEENT: normal with no HA's, sore throat, stiff neck, changes in vision  CV: normal with no CP, SOB, PND, GRIFFIN or orthopnea  PULM: normal with no SOB, cough, hemoptysis, sputum or pleuritic pain  GI: chronic N/V with the chemotherapy; has peg tube; reflux  : normal with no hematuria, dysuria  BREAST: normal with no mass, discharge, pain  SKIN: normal with no rash, erythema, bruising, or swelling     Pain Scale:  0    Allergies:  Review of patient's allergies indicates:   Allergen Reactions    Lovastatin Itching    Pollen extracts     Lovastatin Rash     Not confirmed but pt skeptical       Medications:    Current Outpatient Medications:     acetaminophen (TYLENOL) 325 MG tablet,  "Take 325 mg by mouth every 6 (six) hours as needed for Pain., Disp: , Rfl:     BD ULTRA-FINE YULISSA PEN NEEDLE 32 gauge x 5/32" Ndle, To be used with Novolog pen up to 4x a day, Disp: 100 each, Rfl: 3    calcitrioL (ROCALTROL) 1 mcg/mL solution, Take 0.25 mLs (0.25 mcg total) by Per G Tube route once daily., Disp: 15 mL, Rfl: 12    calcitrioL (ROCALTROL) 1 mcg/mL solution, 0.25 mLs (0.25 mcg total) by Per G Tube route once daily., Disp: 15 mL, Rfl: 3    calcium carbonate (TUMS) 200 mg calcium (500 mg) chewable tablet, 2 tablets (1,000 mg total) by Per G Tube route 5 (five) times daily., Disp: 300 tablet, Rfl: 11    calcium carbonate 500 mg/5 mL (1,250 mg/5 mL), 10 mLs (1,000 mg total) by Per G Tube route 3 (three) times daily with meals., Disp: 473 mL, Rfl: 12    esomeprazole (NEXIUM) 40 MG capsule, 40 mg before breakfast., Disp: , Rfl:     famotidine (PEPCID) 40 mg/5 mL (8 mg/mL) suspension, 2.5 mLs (20 mg total) by Per G Tube route 2 (two) times daily., Disp: 50 mL, Rfl: 11    glucagon (GLUCAGEN HYPOKIT) 1 mg SolR, Inject 1 mg into the muscle as needed (FOR HYPOGLYCEMIA)., Disp: 1 each, Rfl: 1    ibuprofen (ADVIL,MOTRIN) 200 MG tablet, Take 200 mg by mouth every 6 (six) hours as needed for Pain., Disp: , Rfl:     insulin aspart U-100 (NOVOLOG FLEXPEN U-100 INSULIN) 100 unit/mL (3 mL) InPn pen, Inject 0-10 Units into the skin as needed; Add correction scale if needed while on TF.  Blood sugar 180-230 add 2 units, 231-280 +4 units, 281-330 +6 units, 331-380 +8 units, >380 +10 units.  MDD 40 units, Disp: 12 mL, Rfl: 0    levothyroxine (SYNTHROID) 100 MCG tablet, 1 tablet (100 mcg total) by Per G Tube route before breakfast., Disp: 30 tablet, Rfl: 11    losartan (COZAAR) 100 MG tablet, Take 0.5 tablets (50 mg total) by mouth once daily., Disp: 45 tablet, Rfl: 3    meclizine (ANTIVERT) 25 mg tablet, 1 tablet (25 mg total) by Per G Tube route 3 (three) times daily as needed., Disp: 20 tablet, Rfl: 0    metFORMIN " "(GLUCOPHAGE) 500 MG tablet, Take 1 tablet (500 mg total) by mouth 2 (two) times daily with meals., Disp: 60 tablet, Rfl: 3    traZODone (DESYREL) 50 MG tablet, TAKE 1 TABLET BY MOUTH NIGHTLY AS NEEDED FOR INSOMNIA., Disp: 90 tablet, Rfl: 1    zolpidem (AMBIEN) 5 MG Tab, 1 tablet (5 mg total) by Per G Tube route nightly as needed (difficult with sleep)., Disp: 30 tablet, Rfl: 0    PMHx/PSHx Updates:  See patient's last visit with me on 10/6/2022.  See H&P on 9/23/2022        Pathology:   Cancer Staging   Larynx cancer  Staging form: Larynx - Glottis, AJCC 8th Edition  - Clinical stage from 8/6/2020: Stage II (cT2, cN0, cM0) - Signed by Fariha Muñoz NP on 8/6/2020  - Pathologic stage from 1/20/2022: Stage LOU (pT4a, pN0, cM0) - Signed by Fariha Muñoz NP on 1/20/2022  Staging form: Pharynx - P16 Negative Oropharynx, AJCC 8th Edition  - Clinical: Stage II (rcT2, cN0, cM0) - Signed by Matheus Portillo Jr., MD on 5/30/2022    Malignant neoplasm of base of tongue  Staging form: Pharynx - P16 Negative Oropharynx, AJCC 8th Edition  - Clinical stage from 5/20/2022: Stage II (cT2, cN0, cM0, p16-) - Signed by Saúl Kessler MD on 7/7/2022    Dissection 11/9/2022:    Final Pathologic Diagnosis 1. "left submandibular lymph node", dissection:       - Three lymph nodes, negative for carcinoma (0/3)   2. 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       - Tumor size: 4.5 cm       - Resection margins: left superior pharyngeal margin focally involved;   all other margins are negative for carcinoma       - Left internal jugular vein: free of tumor       - One lymph node, negative for carcinoma (0/1)   Note: P16 immunostain is negative     Tumor Site       Oropharynx  involving Base of tongue       Tumor Laterality       Midline       Tumor Size       Greatest Dimension (Centimeters) 4.5 cm         HPV-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) " squamous cell carcinoma (oropharynx)       Histologic Grade       G2 to G3: Moderate to Poorly differentiated       Lymphovascular Invasion       Not identified       Perineural Invasion       Not identified     MARGINS      Margins       Involved by invasive tumor     Margin(s)   Involved by Invasive Tumor:      left superior pharyngeal margin; all other   margins are free of tumor     LYMPH NODES    Regional Lymph Node Status:    :     Regional Lymph Node   Status:      All regional lymph nodes negative for tumor      pT Category:               pT3   pN Category:               pN0      Base of Tongue Biopsy  4/27/2022:  Final Pathologic Diagnosis BIOPSY OF BASE OF THE TONGUE:   THE INFILTRATING POORLY DIFFERENTIATED SQUAMOUS CELL CARCINOMA   THE TUMOR IS P16 NEGATIVE.  THE POSITIVE AND NEGATIVE CONTROLS STAINED   APPROPRIATELY      Larynx resection/thyroidectomy 1/6/2022:     Final Pathologic Diagnosis 1. Lymph nodes, left neck levels 2,  3 and 4, dissection:   - Nineteen lymph nodes, all  negative  for metastatic carcinoma (0/19)   2. Lymph nodes, right neck levels 2,  3 and 4, dissection:   - Twenty-eight lymph nodes, all  negative  for metastatic carcinoma (0/28)   3. Possible parathyroid gland, excision:   - Benign parathyroid gland tissue (0.003 g)   4. 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 Soft tissue, posterior tracheal margin, excision:   - Benign, focally reactive respiratory mucosa with submucosal acute   inflammation,  negative  for  dysplasia or malignancy   6. Soft tissue, anterior tracheal margin, excision:   - Benign respiratory mucosa with subepithelial cartilage,  negative  for   dysplasia or malignancy   7. Soft tissue, posterior tracheal margin #2, excision:   - Benign, focally reactive respiratory mucosa with submucosal acute   inflammation,  negative  for dysplasia or malignancy   8. Soft tissue, anterior tracheal margin #2, excision:   - Benign, reactive focally ulcerated respiratory mucosa with submucosal acute   inflammation,  negative  for dysplasia or malignancy             Laryngoscope 8/4/2020: (with Dr Noel):  Final Pathologic Diagnosis 1.  Left true vocal fold, biopsy:       -  Invasive moderately differentiated squamous cell carcinoma,   keratinizing type   2.  Left true vocal fold, biopsy:       -  Invasive moderately differentiated squamous cell carcinoma,   keratinizing type             Laryngoscope 3/17/2020 (with Dr Xiao):  Final Pathologic Diagnosis 1.  BIOPSY OF LEFT TRUE VOCAL CORD:   SEVERELY DYSPLASTIC APPEARING SQUAMOUS MUCOSA   INVASIVE CARCINOMA IS NOT DOCUMENTED   ON THE OTHER HAND, THESE FRAGMENTS ARE NODUL AND WITHOUT SUBMUCOSA FOR   EVALUATION; IT IS POSSIBLE THAT THEY REFLECT INVASIVE SQUAMOUS CARCINOMA   2.  BIOPSY OF LEFT ANTERIOR COMMISSURE:   MODERATE DYSPLASIA   NO INVASIVE CARCINOMA IDENTIFIED             Objective:     Vitals:  There were no vitals taken for this visit.    Physical Examination:   GEN: no apparent distress, comfortable; AAOx3  HEAD: atraumatic and normocephalic  EYES: no pallor, no icterus, PERRLA  ENT: OMM, no pharyngeal erythema, external ears WNL; no nasal discharge; no thrush; s/p laryngectomy with flap; trach   NECK: no masses, thyroid normal, trachea midline, no LAD/LN's, supple; post-op dissection healing well; staples in place  CV: RRR with no murmur; normal pulse; normal S1 and S2; no pedal edema; portacath left CW removed  CHEST: Normal respiratory effort; CTAB; normal  breath sounds; no wheeze or crackles  ABDOM: nontender and nondistended; soft; normal bowel sounds; no rebound/guarding; peg tube  MUSC/Skeletal: ROM normal; no crepitus; joints normal; no deformities or arthropathy  EXTREM: no clubbing, cyanosis, inflammation or swelling  SKIN: no rashes, lesions, ulcers, petechiae or subcutaneous nodules  : no mahan  NEURO: grossly intact; motor/sensory WNL; AAOx3; no tremors  PSYCH: normal mood, affect and behavior  LYMPH: normal cervical, supraclavicular, axillary and groin LN's          Labs:     Lab Results   Component Value Date    WBC 5.50 12/13/2022    HGB 9.9 (L) 12/13/2022    HCT 30.0 (L) 12/13/2022    MCV 93 12/13/2022     12/13/2022     CMP  Sodium   Date Value Ref Range Status   12/13/2022 133 (L) 136 - 145 mmol/L Final     Potassium   Date Value Ref Range Status   12/13/2022 4.5 3.5 - 5.1 mmol/L Final     Chloride   Date Value Ref Range Status   12/13/2022 102 95 - 110 mmol/L Final     CO2   Date Value Ref Range Status   12/13/2022 24 23 - 29 mmol/L Final     Glucose   Date Value Ref Range Status   12/13/2022 152 (H) 70 - 110 mg/dL Final     BUN   Date Value Ref Range Status   12/13/2022 19 8 - 23 mg/dL Final     Creatinine   Date Value Ref Range Status   12/13/2022 0.8 0.5 - 1.4 mg/dL Final     Calcium   Date Value Ref Range Status   12/13/2022 9.1 8.7 - 10.5 mg/dL Final     Total Protein   Date Value Ref Range Status   12/13/2022 6.9 6.0 - 8.4 g/dL Final     Albumin   Date Value Ref Range Status   12/13/2022 3.0 (L) 3.5 - 5.2 g/dL Final   11/18/2020 3.7 3.6 - 5.1 g/dL Final     Comment:     For additional information, please refer to   http://education.[employer] Twitter Dashboard/faq/WMI088 (This link is   being provided for informational/ educational purposes only.)  This test was developed and its analytical performance   characteristics have been determined by [facility]. It has not been cleared or approved by the   [place] Food  and Drug Administration. This assay has been validated   pursuant to the CLIA regulations and is used for clinical   purposes.  @ Test Performed By:  [facility]  Yo Cortes M.D.,   [address] [place] [id number]  [place]  [id number]       Total Bilirubin   Date Value Ref Range Status   12/13/2022 1.3 (H) 0.1 - 1.0 mg/dL Final     Comment:     For infants and newborns, interpretation of results should be based  on gestational age, weight and in agreement with clinical  observations.    Premature Infant recommended reference ranges:  Up to 24 hours.............<8.0 mg/dL  Up to 48 hours............<12.0 mg/dL  3-5 days..................<15.0 mg/dL  6-29 days.................<15.0 mg/dL       Alkaline Phosphatase   Date Value Ref Range Status   12/13/2022 826 (H) 55 - 135 U/L Final     AST   Date Value Ref Range Status   12/13/2022 36 10 - 40 U/L Final     ALT   Date Value Ref Range Status   12/13/2022 59 (H) 10 - 44 U/L Final     Anion Gap   Date Value Ref Range Status   12/13/2022 7 (L) 8 - 16 mmol/L Final     eGFR if    Date Value Ref Range Status   07/29/2022 >60.0 >60 mL/min/1.73 m^2 Final     eGFR if non    Date Value Ref Range Status   07/29/2022 >60.0 >60 mL/min/1.73 m^2 Final     Comment:     Calculation used to obtain the estimated glomerular filtration  rate (eGFR) is the CKD-EPI equation.          Lab Results   Component Value Date    IRON 30 (L) 11/25/2022    TRANSFERRIN 114 (L) 11/25/2022    TIBC 169 (L) 11/25/2022    FESATURATED 18 (L) 11/25/2022            Radiology/Diagnostic Studies:      CTA Neck    Result Date: 9/20/2022  EXAMINATION: CTA NECK CLINICAL HISTORY: Head/neck cancer, monitor;Malignant neoplasm of base of tongue TECHNIQUE: CT angiogram was performed from the level of the scott to the EAC following the IV administration of 75mL of Omnipaque 350.   Sagittal and coronal reconstructions and maximum  intensity projection reconstructions were performed. Arterial stenosis percentages are based on NASCET measurement criteria. COMPARISON: CTA neck dated 05/20/2022 FINDINGS: Aortic arch and great vessels: There is a conventional left-sided 3 vessel arch.  The aortic arch is widely patent.  There is stable soft and calcified plaque in the proximal left subclavian artery with a similar appearance the prior study and possibly within radiation field but without critical stenosis or occlusion.  The right subclavian artery is patent.  The brachiocephalic trunk and origin of the left common carotid artery are patent. Carotid arteries Right carotid artery: There is calcified plaque scattered in the right common carotid artery without critical stenosis, occlusion or change.  There is no measurable stenosis of the internal carotid artery which is patent to the skull base. Left carotid artery: There is mild plaque scattered in the left common carotid artery without change and without critical stenosis or occlusion.  There is no measurable stenosis of the internal carotid artery. Vertebral arteries: The left vertebral artery is dominant and patent throughout its course in the neck.  The right vertebral artery is hypoplastic but patent.  There is no critical stenosis, occlusion, thrombus or dissection.  There is no change. The study extends intracranially.  There is calcified plaque in the V4 segment of the left vertebral artery resulting in a moderate to marked short segment nonocclusive stenosis.  The right vertebral artery partially terminates in a posteroinferior cerebellar artery with a short segment moderate to marked stenosis of the very distal right vertebral artery.  Some flow within the distal right vertebral artery may represent retrograde flow from the dominant left vertebral artery.  The basilar artery is patent.  The origins of the superior cerebellar and posterior cerebral artery on the right are patent.  There is  fetal origin of the left posterior cerebral artery which is patent. There is plaque in the cavernous segments of both internal carotid arteries but there is no critical stenosis or large vessel occlusion of the anterior circulation vessels.  There is no large aneurysm. Other: There is a similar appearance of the included upper lungs with pleural and parenchymal changes anteriorly in the upper lobes bilaterally.  As previously discussed, timing of the contrast bolus is performed during the arterial phase for CTA neck.  Therefore, enhancement of the soft tissues is somewhat suboptimal due to this technique. There has been a large region of soft tissue resection in the anterior mid and lower neck soft tissues with continued open defect in the upper chest and lower neck above the manubrium.  Soft tissue seen along the left posterolateral border of the trachea could represent secretions or mucus.  This was present previously. Redemonstrated is a large heterogeneously enhancing lesion centered at the level of the tongue base and floor of the mouth centrally into the left.  There is scattered calcifications.  The prior CTA demonstrated regions of central necrosis which are no longer definitely present but diffusely heterogeneous density and enhancement likely represents regions of necrosis.  This large heterogeneously enhancing soft tissue mass extends more anteriorly in the floor of the mouth on the left and measures at least 5.6 cm in greatest anterior to posterior dimension with 3.6 cm transverse dimension.  This does extend anteriorly to the medial margin of the body of the mandible on the left. There are post treatment changes with stranding in the neck fat.     1. Similar appearance of the neck vessels when compared to the prior CTA neck with mild narrowing at the origin of the left subclavian artery.  There is no critical stenosis, occlusion, thrombosis or dissection involving the cervical vertebral or carotid  arteries. 2. Larger mass within the hypopharynx and tongue base extending anteriorly to the floor of the mouth on the left to the medial margin of the body of the mandible without obvious bony destruction. 3. There is nonocclusive stenosis of the distal V4 segment of the left vertebral artery without change. 4. There is no large vessel occlusion or critical stenosis of the anterior circulation. 5. There is developmental variation with fetal origin of the left posterior cerebral artery. Electronically signed by: Rex Joyner MD Date:    09/20/2022 Time:    11:31    CT Abdomen Pelvis With Contrast    Result Date: 9/1/2022  CMS MANDATED QUALITY DATA - CT RADIATION - 436 All CT scans at this facility utilize dose modulation, iterative reconstruction, and/or weight based dosing when appropriate to reduce radiation dose to as low as reasonably achievable. Reason: abdominal pain partial history of laryngeal carcinoma TECHNIQUE: CT abdomen and pelvis with 100 mL Omnipaque 350. COMPARISON: PET/CT 5/13/2022 CT ABDOMEN: Coronary artery calcification and extremely tiny hiatal hernia incidentally noted. Visualized lung bases are clear. Liver, gallbladder, pancreas, spleen, adrenals, and kidneys are normal. Mild aortoiliac calcifications present. Gastrostomy tube tip lies in distal gastric body. Small intestines are unremarkable. Mild wall thickening affects the ascending colon with pneumatosis intestinalis diffusely affecting the ascending colon. No other free intraperitoneal gas or, and remainder of colon is normal. A normal appendix is present. The major mesenteric vascular structures are patent. No acute osseous abnormality. CT PELVIS: Prostate is slightly enlarged. Bladder is normal. No free pelvic fluid. Tiny right and small to moderate left fat-containing inguinal hernias are present. No acute osseous abnormality. IMPRESSION: 1. Pneumatosis intestinalis affecting the ascending colon with associated mild wall  thickening. Etiology of this is undetermined. Potential considerations include intestinal ischemia or pneumatosis related to chemotherapy. Clinical and laboratory correlation is needed to assess significance. No portal venous gas or free intraperitoneal air however. 2. Coronary artery calcifications Electronically signed by:  Nael Hui MD  9/1/2022 6:20 PM CDT Workstation: [id number]    NM PET CT Routine Skull to Mid Thigh    Result Date: 9/27/2022  PET CT WITH IMAGE FUSION HISTORY:  restage tongue cancer RESTAGING...  HX: TONGUE CA.  DX: April 2022.  LAST CHEMO TREATMENT WAS 3WKS AGO.  EVALUATE TX RESPONSE.   ALSO HX OF LARYNGEAL CA IN AUGUST 2020.  MULTIPLE SURGERIES: LARYNGECTOMY, THYROIDECTOMY & TRACHEOSTOMY.  RAD TX WAS COMPLETED IN NOV 2020. TECHNIQUE: Following IV administration of 11.2 mCi of F-18 labeled FDG into right antecubital fossa and a 60 minute delay, PET CT was performed from the vertex of the skull through the proximal thighs with an integrated PET CT scanner with image fusion. CT images were obtained to aid in attenuation correction and PET localization. The patient's serum glucose at the time of the exam was 136 mg/dL. COMPARISON: PET/CT 5/13/2022 FINDINGS: Poorly characterized soft tissue mass involving base of tongue approximately measures 4.3 x 2.8 cm (series 3 image 65) appearing similar to the prior exam. This reaches current max SUV of 11.8, not significantly changed from prior of 11.4. No other abnormal FDG activity. Intracranial compartment is unremarkable. Trace bilateral maxillary sinus mucosal thickening is present. Postoperative changes of laryngectomy and tracheostomy are present. Surgical clips occur throughout the neck. No definite enlarged cervical or supraclavicular lymph node, with evaluation limited by lack of IV contrast. Left subclavian port catheter tip terminates in SVC. Diffuse coronary artery calcifications are present. No enlarged mediastinal or axillary lymph  nodes. No pulmonary nodule or mass. Gastrostomy tube tip lies in gastric body. Moderate aortoiliac calcifications are present. Small fat-containing left inguinal hernia incidentally noted. Mild degenerative changes affect the spine. No suspicious osseous abnormality. IMPRESSION: 1. No significant change in the FDG avid mass involving the tongue base, remaining characteristic of malignancy. 2. No new FDG avid malignancy or metastatic disease. Electronically signed by:  Nael Hui MD  9/27/2022 2:38 PM CDT Workstation: [id number]    CT Abdomen Pelvis  Without Contrast    Result Date: 9/4/2022  CMS MANDATED QUALITY DATA - CT RADIATION  436 All CT scans at this facility utilize dose modulation, iterative reconstruction, and/or weight based dosing when appropriate to reduce radiation dose to as low as reasonably achievable. CT ABDOMEN PELVIS WITHOUT IV CONTRAST CLINICAL HISTORY: 72 years Male Follow-up pneumatosis COMPARISON: CT abdomen and pelvis September 1, 2022 FINDINGS: Imaging through the lower thorax demonstrates subsegmental atelectasis of the dependent lower lobes. Coronary artery calcification. Bone window images show no acute or aggressive osseous abnormality. Transitional lumbosacral vertebral body. On this unenhanced exam, no focal hepatic lesion. Gallbladder and biliary tree are unremarkable. Spleen appears normal. Pancreas is unremarkable. No adrenal lesion. No renal calculi or hydronephrosis. Ureters are normal in caliber. Urinary bladder is within normal limits. Gastrostomy tube is in place within the stomach. Stomach is largely collapsed. No evidence of small bowel obstruction. Pneumatosis and pericolonic abscess involving the ascending colon is redemonstrated, slightly diminished compared to prior. Mild wall thickening throughout the colon. No free fluid or free air within the abdomen or pelvis. Aortoiliac atherosclerotic calcification. No pathologically enlarged lymph nodes within the abdomen or  pelvis. Bilateral fat-containing inguinal hernias, larger on the left. IMPRESSION: Pneumatosis and pericolonic gas about the ascending colon has decreased in volume compared to prior. Persistent colonic wall thickening which could indicate colitis. Coronary and aortic atherosclerosis. Gastrostomy tube is within the stomach. Bilateral inguinal hernias. Electronically signed by:  Jose De Jesus Oleary MD  9/4/2022 4:06 PM CDT Workstation: [id number]    CTA neck  5/20/2022:     IMPRESSION:  Persistent mass within the hypopharynx consistent with malignancy.  By my measurements it is larger than on April 24, 2022 with central necrosis.  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 This is similar to the previous April 2022 study.  No evidence of arterial compromise related to malignancy.     PET 5/13/2022:     IMPRESSION:  1. Postoperative changes of prior laryngectomy and lymph node resection/radiation.  2. Masslike FDG avid lesion to the left of midline at the tongue base concerning for residual/recurrent disease.  3. Adjacent confluent nodular extension along the inferior left side of the mass.  4. No FDG avid lymphadenopathy or convincing additional sites of disease in the chest, abdomen or pelvis.     CT head 5/10/2022:  FINDINGS: Comparison to multiple prior exams. There is no acute intracranial hemorrhage, with no mass effect or abnormal extra-axial fluid. Mild scattered areas of nonspecific hypoattenuation involve the deep periventricular white matter, with gray-white differentiation maintained.     There is mild generalized prominence of the cortical sulci and ventricles. The cerebellum and brainstem are unremarkable. There are carotid siphon vascular calcifications. The visualized paranasal sinuses and mastoid air cells are clear. There is no acute calvarial fracture or scalp hematoma.     IMPRESSION: No acute intracranial hemorrhage or acute calvarial  fracture     CT soft neck 4/24/2022;     Oral tongue/tongue base:  At the base of the tongue on the left there are findings of a heterogeneously enhancing mass measuring 2.1 cm highly concerning for a neoplastic process.     True and false cords:  Patient status post laryngectomy which has been performed in the interim. Soft tissue stranding in the lower neck likely related to a previous flat reconstruction. No enhancement or lymphadenopathy within the lower neck.     Lymph node assessment:Negative     Surrounding soft tissues:  Negative     Vasculature:  Negative     Osseous structures:  Negative     Lung apices:  Scarring involving the lung apices bilaterally likely related to previous radiation.     IMPRESSION:  1. Postoperative and radiation changes involving the lower neck.  2. Findings highly concerning for a developing mass at the left base of the tongue enhancing 2.1 cm region. Direct visualization in this region would be of benefit.        CT soft neck  12/24/2021  IMPRESSION:  1.  Laryngeal mass as on prior exam. Laryngeal airway narrowing has not changed.  2.  No evidence for active hemorrhage.  3.  Partially visualized patchy groundglass infiltrates in both upper lobes. Also see CT chest report     CTA Chest 12/24/2021:  IMPRESSION:     1.  No pulmonary embolism.     2.  Soft tissue stranding in the region of the strap musculature with ill-defined hypodensity measuring 2.8 x 1.4 cm in the cervical midline.  Findings concerning for infectious process or abscess. Neoplastic process could have similar imaging characteristics.     3.  Suggested erosion of the proximal right parasymphyseal aspect of the thyroid shield.  Correlated with CT soft tissue neck findings. Findings could represent osteomyelitis. Neoplastic process cannot be excluded.     4.  Hepatic steatosis.  5.  Thickening of the gastric wall. Prominence of perigastric vasculature. Small hiatal hernia.     6.  Moderate stool burden.  Correlate for  constipation.        PET 12/15/2021:  IMPRESSION:     Substantial qualitative and quantitative increase of hypermetabolic FDG activity associated with the larynx in this patient with known supraglottic neoplasm.     No evidence of FDG avid metastatic disease involving neck, chest, abdomen or pelvis.     PET 8/21/2020:     Impression:     1. Intensely hypermetabolic plaque-like mass along the left true vocal cord, compatible with known laryngeal carcinoma.  2. No findings of regional metastatic disease in the neck, or distant metastatic disease.        CT Chest 8/10/2020:     IMPRESSION:     No metastatic disease within the chest.  Three-vessel coronary artery calcification. Nondilated cardiac  chambers     CT Soft neck 7/22/2020:  IMPRESSION:  1. Slight interval increase in size of the previously described  enhancing nodule along the anterior left vocal cord.  2. No pathologic lymphadenopathy.  3. Additional and incidental findings as noted above.     CT Soft neck  3/16/2020:     Impression:     6 mm enhancing focus involving the superior aspect of the left focal cord near the anterior commisure.  Recommend direct visualization for further evaluation of laryngeal polyp     Question of 2 mm submucosal lipoma involving the midportion of the superior aspect of the left vocal cord.     Mild arteriosclerosis involving the brachiocephalic arteries and carotid arteries     Mild degenerative change of the cervical spine        I have reviewed all available lab results and radiology reports.    Assessment/Plan:   (1) 72 y.o. 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     - Patient has been under the care of Dr Khoobehi with [facility] and Dr Portillo with rad/onc.   - He was recently found to have a new primary cancer involving the base of his tongue in April 2022. He was deemed not to be a  candidate for any further radiation and did not want to undergo any further surgery as this would necessitate a glossectomy procedure.   - He has been on adjuvant chemotherapy per direction of Dr Khoobehi and has had 3 cycles. His therapy course has been complicated with persistent diarrhea and N/V.      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 He saw Dr Lucero with Rad/onc this am. They are going to present his case to [facility] Tumor Board at [facility] and plans to see Dr James about surgical options. If he is not a surgical candidate then will proceed with cisplatin and XRT combine therapy.     10/6/2022:  - He saw Dr Lucero with Rad/onc, and Dr James and his case was presented to H&N Tumor Board at [facility] and  he general consensus was to proceed to surgery.  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   - He had his portacath removed on 11/28.   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 He had a CT scan on 3/16/2020 which showed a 6mm nodule on the left vocal cord. A repeat Ct scan four months later on 7/22/2020 showed the nodule enlarged to 1.4 x 1.1 cm in size. Patient was then seen by Dr Noel and underwent repeat scope in Aug 2020 who found a bulky deeply invading tumor which was debulked and the pathology coming back moderately differentiated SCCA without lymphovascular or perineural invasion. Hs case was subsequently presented to the tumor board and the consensus was to proceed with radiation. He completed XRT on 10/30/2020 and proceeded with regular laryngoscopy surveillance. He eventually required salvage laryngectomy and neck dissection with flap with Dr James on Jan 6th 2022. 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 Fifty lymph nodes were removed which were all negative. Pathology TNM was pT4a, pN0. Patient did not require any adjuvant therapy afterwards.      (2) HTN and hypercholesterolemia     (3) Paroxysmal atrial fibrillation, V tach     (4) DM II     (5) B12 deficiency      (6) Fatty liver disease, GERD           VISIT DIAGNOSES:      S/P laryngectomy    Tracheostomy in place    Malignant neoplasm of base of tongue    Larynx cancer    Laryngeal cancer    Iron deficiency anemia due to chronic blood  loss    Vitamin B12 deficiency    Abnormal CT scan, neck          PLAN:  Proceed with planned ENT f/u on 1/3/2023; he may need some further systemic therapy due to the margin  Check on Rad/onc follow-up   F/u with Dr James with ENT and Dr Lucero with rad/onc  Check labs weekly  F/u with PCP, ENT, etc  Set up with dietician on the tube feeds     RTC in  3-4 weeks  Fax note to  Bandar/Dameon Lucero Hasney, Yesenia Gomez       Discussion:     COVID-19 Discussion:     I had long discussion with patient and any applicable family about the COVID-19 coronavirus epidemic and the recommended precautions with regard to cancer and/or hematology patients. I have re-iterated the CDC recommendations for adequate hand washing, use of hand -like products, and coughing into elbow, etc. In addition, especially for our patients who are on chemotherapy and/or our otherwise immunocompromised patients, I have recommended avoidance of crowds, including movie theaters, restaurants, churches, etc. I have recommended avoidance of any sick or symptomatic family members and/or friends. I have also recommended avoidance of any raw and unwashed food products, and general avoidance of food items that have not been prepared by themselves. The patient has been asked to call us immediately with any symptom developments, issues, questions or other general concerns.         Pathology Discussion:     I reviewed and discussed the pathology report(s) and radiograph reports (if available) in as simple to understand and/or laymen's terms to the best of my ability. I had an indepth conversation with the patient and went over the patient's individual diagnosis based on the information that was currently available. I discussed the TNM staging process with regard to the patient's particular cancer type, and the calculated stage based on the currently available TNM data and literature. I discussed the available prognostic data with regard  "to the current staging information and how it relates to the prognosis of their particular neoplastic process.          NCCN Guidelines:     I discussed the available treatment option(s) in accordance with the latest literature from the NCCN Clinical Practice Guidelines for the patient's particular type of cancer disorder. The NCCN Guidelines provide a "document evidence-based (and) consensus-driven management" of the care of oncology patients. The treatment recommendations were made not only in accordance to the NCCN guidelines, but also factored in to account the patient's overall age, condition, performance status and their medical co-morbidities. I went over the risks and benefits of the the treatment options (if any could be made) with regard to their particular cancer type, their cancer stage, their age, and their co-morbidities.         I have explained and the patient understands all of  the current recommendation(s). I have answered all of their questions to the best of my ability and to their complete satisfaction.         I spent over 25 mins of time with the patient. Reviewed results of the recently ordered labs, tests and studies; made directives with regards to the results. Over half of this time was spent couseling and coordinating care.    I have explained all of the above in detail and the patient understands all of the current recommendation(s). I have answered all of their questions to the best of my ability and to their complete satisfaction.   The patient is to continue with the current management plan.            Electronically signed by Venu Tamez MD                 "

## 2022-12-27 ENCOUNTER — OFFICE VISIT (OUTPATIENT)
Dept: HEMATOLOGY/ONCOLOGY | Facility: CLINIC | Age: 72
End: 2022-12-27
Payer: MEDICARE

## 2022-12-27 ENCOUNTER — DOCUMENTATION ONLY (OUTPATIENT)
Dept: HEMATOLOGY/ONCOLOGY | Facility: CLINIC | Age: 72
End: 2022-12-27

## 2022-12-27 ENCOUNTER — CLINICAL SUPPORT (OUTPATIENT)
Dept: DIABETES | Facility: CLINIC | Age: 72
End: 2022-12-27
Payer: MEDICARE

## 2022-12-27 VITALS
RESPIRATION RATE: 16 BRPM | HEART RATE: 76 BPM | SYSTOLIC BLOOD PRESSURE: 107 MMHG | TEMPERATURE: 98 F | BODY MASS INDEX: 20.86 KG/M2 | HEIGHT: 66 IN | DIASTOLIC BLOOD PRESSURE: 70 MMHG | WEIGHT: 129.81 LBS

## 2022-12-27 DIAGNOSIS — E53.8 VITAMIN B12 DEFICIENCY: ICD-10-CM

## 2022-12-27 DIAGNOSIS — Z79.4 TYPE 2 DIABETES MELLITUS WITH HYPERGLYCEMIA, WITH LONG-TERM CURRENT USE OF INSULIN: ICD-10-CM

## 2022-12-27 DIAGNOSIS — C32.9 LARYNGEAL CANCER: ICD-10-CM

## 2022-12-27 DIAGNOSIS — Z90.02 S/P LARYNGECTOMY: Primary | ICD-10-CM

## 2022-12-27 DIAGNOSIS — C32.9 LARYNX CANCER: ICD-10-CM

## 2022-12-27 DIAGNOSIS — Z93.0 TRACHEOSTOMY IN PLACE: ICD-10-CM

## 2022-12-27 DIAGNOSIS — C01 MALIGNANT NEOPLASM OF BASE OF TONGUE: ICD-10-CM

## 2022-12-27 DIAGNOSIS — D50.0 IRON DEFICIENCY ANEMIA DUE TO CHRONIC BLOOD LOSS: ICD-10-CM

## 2022-12-27 DIAGNOSIS — R93.89 ABNORMAL CT SCAN, NECK: ICD-10-CM

## 2022-12-27 DIAGNOSIS — E11.65 TYPE 2 DIABETES MELLITUS WITH HYPERGLYCEMIA, WITH LONG-TERM CURRENT USE OF INSULIN: ICD-10-CM

## 2022-12-27 PROCEDURE — 1111F DSCHRG MED/CURRENT MED MERGE: CPT | Mod: CPTII,S$GLB,, | Performed by: INTERNAL MEDICINE

## 2022-12-27 PROCEDURE — 1126F PR PAIN SEVERITY QUANTIFIED, NO PAIN PRESENT: ICD-10-PCS | Mod: CPTII,S$GLB,, | Performed by: INTERNAL MEDICINE

## 2022-12-27 PROCEDURE — 3078F DIAST BP <80 MM HG: CPT | Mod: CPTII,S$GLB,, | Performed by: INTERNAL MEDICINE

## 2022-12-27 PROCEDURE — 1160F RVW MEDS BY RX/DR IN RCRD: CPT | Mod: CPTII,S$GLB,, | Performed by: INTERNAL MEDICINE

## 2022-12-27 PROCEDURE — 3008F PR BODY MASS INDEX (BMI) DOCUMENTED: ICD-10-PCS | Mod: CPTII,S$GLB,, | Performed by: INTERNAL MEDICINE

## 2022-12-27 PROCEDURE — 3066F NEPHROPATHY DOC TX: CPT | Mod: CPTII,S$GLB,, | Performed by: INTERNAL MEDICINE

## 2022-12-27 PROCEDURE — 3008F BODY MASS INDEX DOCD: CPT | Mod: CPTII,S$GLB,, | Performed by: INTERNAL MEDICINE

## 2022-12-27 PROCEDURE — 3074F PR MOST RECENT SYSTOLIC BLOOD PRESSURE < 130 MM HG: ICD-10-PCS | Mod: CPTII,S$GLB,, | Performed by: INTERNAL MEDICINE

## 2022-12-27 PROCEDURE — 3044F PR MOST RECENT HEMOGLOBIN A1C LEVEL <7.0%: ICD-10-PCS | Mod: CPTII,S$GLB,, | Performed by: INTERNAL MEDICINE

## 2022-12-27 PROCEDURE — 3078F PR MOST RECENT DIASTOLIC BLOOD PRESSURE < 80 MM HG: ICD-10-PCS | Mod: CPTII,S$GLB,, | Performed by: INTERNAL MEDICINE

## 2022-12-27 PROCEDURE — 99214 PR OFFICE/OUTPT VISIT, EST, LEVL IV, 30-39 MIN: ICD-10-PCS | Mod: S$GLB,,, | Performed by: INTERNAL MEDICINE

## 2022-12-27 PROCEDURE — 3288F FALL RISK ASSESSMENT DOCD: CPT | Mod: CPTII,S$GLB,, | Performed by: INTERNAL MEDICINE

## 2022-12-27 PROCEDURE — 1126F AMNT PAIN NOTED NONE PRSNT: CPT | Mod: CPTII,S$GLB,, | Performed by: INTERNAL MEDICINE

## 2022-12-27 PROCEDURE — G0108 DIAB MANAGE TRN  PER INDIV: HCPCS | Mod: S$GLB,,, | Performed by: NUTRITIONIST

## 2022-12-27 PROCEDURE — 99999 PR PBB SHADOW E&M-EST. PATIENT-LVL III: ICD-10-PCS | Mod: PBBFAC,,, | Performed by: NUTRITIONIST

## 2022-12-27 PROCEDURE — 4010F PR ACE/ARB THEARPY RXD/TAKEN: ICD-10-PCS | Mod: CPTII,S$GLB,, | Performed by: INTERNAL MEDICINE

## 2022-12-27 PROCEDURE — 1111F PR DISCHARGE MEDS RECONCILED W/ CURRENT OUTPATIENT MED LIST: ICD-10-PCS | Mod: CPTII,S$GLB,, | Performed by: INTERNAL MEDICINE

## 2022-12-27 PROCEDURE — 3044F HG A1C LEVEL LT 7.0%: CPT | Mod: CPTII,S$GLB,, | Performed by: INTERNAL MEDICINE

## 2022-12-27 PROCEDURE — 1101F PR PT FALLS ASSESS DOC 0-1 FALLS W/OUT INJ PAST YR: ICD-10-PCS | Mod: CPTII,S$GLB,, | Performed by: INTERNAL MEDICINE

## 2022-12-27 PROCEDURE — 1160F PR REVIEW ALL MEDS BY PRESCRIBER/CLIN PHARMACIST DOCUMENTED: ICD-10-PCS | Mod: CPTII,S$GLB,, | Performed by: INTERNAL MEDICINE

## 2022-12-27 PROCEDURE — 3066F PR DOCUMENTATION OF TREATMENT FOR NEPHROPATHY: ICD-10-PCS | Mod: CPTII,S$GLB,, | Performed by: INTERNAL MEDICINE

## 2022-12-27 PROCEDURE — 3060F PR POS MICROALBUMINURIA RESULT DOCUMENTED/REVIEW: ICD-10-PCS | Mod: CPTII,S$GLB,, | Performed by: INTERNAL MEDICINE

## 2022-12-27 PROCEDURE — 1101F PT FALLS ASSESS-DOCD LE1/YR: CPT | Mod: CPTII,S$GLB,, | Performed by: INTERNAL MEDICINE

## 2022-12-27 PROCEDURE — 1159F PR MEDICATION LIST DOCUMENTED IN MEDICAL RECORD: ICD-10-PCS | Mod: CPTII,S$GLB,, | Performed by: INTERNAL MEDICINE

## 2022-12-27 PROCEDURE — 4010F ACE/ARB THERAPY RXD/TAKEN: CPT | Mod: CPTII,S$GLB,, | Performed by: INTERNAL MEDICINE

## 2022-12-27 PROCEDURE — 1159F MED LIST DOCD IN RCRD: CPT | Mod: CPTII,S$GLB,, | Performed by: INTERNAL MEDICINE

## 2022-12-27 PROCEDURE — 99214 OFFICE O/P EST MOD 30 MIN: CPT | Mod: S$GLB,,, | Performed by: INTERNAL MEDICINE

## 2022-12-27 PROCEDURE — 3060F POS MICROALBUMINURIA REV: CPT | Mod: CPTII,S$GLB,, | Performed by: INTERNAL MEDICINE

## 2022-12-27 PROCEDURE — 3288F PR FALLS RISK ASSESSMENT DOCUMENTED: ICD-10-PCS | Mod: CPTII,S$GLB,, | Performed by: INTERNAL MEDICINE

## 2022-12-27 PROCEDURE — G0108 PR DIAB MANAGE TRN  PER INDIV: ICD-10-PCS | Mod: S$GLB,,, | Performed by: NUTRITIONIST

## 2022-12-27 PROCEDURE — 99999 PR PBB SHADOW E&M-EST. PATIENT-LVL III: CPT | Mod: PBBFAC,,, | Performed by: NUTRITIONIST

## 2022-12-27 PROCEDURE — 3074F SYST BP LT 130 MM HG: CPT | Mod: CPTII,S$GLB,, | Performed by: INTERNAL MEDICINE

## 2022-12-27 NOTE — PROGRESS NOTES
Diabetes Care Specialist Progress Note  Author: Jolynn Goel RD  Date: 12/27/2022         Lab Results   Component Value Date    HGBA1C 5.8 11/22/2022       NOTE:  Pt uses cell phone to communicate. Wife also present for appointment and will provide information.    Clinical    Patient Health Rating  Compared to other people your age, how would you rate your health?: Fair    Problem Review  Reviewed Problem List with Patient: yes  Active comorbidities affecting diabetes self-care.: yes  Comorbidities: Cancer, Cardiovascular Disease, Hypertension (tongue cancer (tongue removed))  Reviewed health maintenance: yes    Clinical Assessment  Current Diabetes Treatment: Oral Medication, Injectable, Insulin (Metformin 500 mg BID; Ozempic 0.5 mg on TH, Novolog--SS (NOT TAKING due to BG<150))  Have you ever experienced hypoglycemia (low blood sugar)?: no  Have you ever experienced hyperglycemia (high blood sugar)?: no    Medication Information  How do you obtain your medications?: Family picks up  How many days a week do you miss your medications?: Never  Do you sometimes have difficulty refilling your medications?: No  Medication adherence impacting ability to self-manage diabetes?: No    Labs  Do you have regular lab work to monitor your medications?: Yes  Type of Regular Lab Work: A1c, Cholesterol, CBC, Other  Where do you get your labs drawn?: [facility]  Lab Compliance Barriers: No    Nutritional Status  Diet: Tube feeding (Currently taking 3-4 Peptamen daily)  Change in appetite?: No  Recent Changes in Weight: Weight Loss  Was weight loss intentional or unintentional?: Unintentional  Current nutritional status an area of need that is impacting patient's ability to self-manage diabetes?: No    Additional Social History    Support  Does anyone support you with your diabetes care?: yes  Who supports you?: self, spouse  Who takes you to your medical appointments?: spouse  Does the current support meet the patient's needs?:  Yes  Is Support an area impacting ability to self-manage diabetes?: No    Access to Mass Media & Technology  Does the patient have access to any of the following devices or technologies?: Smart phone  Media or technology needs impacting ability to self-manage diabetes?: No    Cognitive/Behavioral Health  Alert and Oriented: Yes  Difficulty Thinking: No  Requires Prompting: No  Cognitive or behavioral barriers impacting ability to self-manage diabetes?: No    Culture/Uatsdin  Culture or Yazidism beliefs that may impact ability to access healthcare: No    Communication  Language preference: English  Hearing Problems: No  Vision Problems: No  Communication needs impacting ability to self-manage diabetes?: No    Health Literacy  Preferred Learning Method: Face to Face  How often do you need to have someone help you read instructions, pamphlets, or written material from your doctor or pharmacy?: Never  Health literacy needs impacting ability to self-manage diabetes?: No      Diabetes Self-Management Skills Assessment    Diabetes Disease Process/Treatment Options  Patient/caregiver able to state what happens when someone has diabetes.: yes  Patient/caregiver knows what type of diabetes they have.: yes  Diabetes Type : Type II  Patient/caregiver able to identify at least three signs and symptoms of diabetes.: yes  Identified signs and symptoms:: blurred vision, fatigue, frequent infections, frequent urination, increased thirst  Patient able to identify at least three risk factors for diabetes.: yes  Identified risk factors:: age over 40, family history, reduced activity  Diabetes Disease Process/Treatment Options: Skills Assessment Completed: Yes  Assessment indicates:: Adequate understanding  Area of need?: No    Nutrition/Healthy Eating  Challenges to healthy eating:: other (see comments)  Nutrition/Healthy Eating Skills Assessment Completed:: Yes  Assessment indicates:: Instruction Needed  Area of need?:  Yes    Physical Activity/Exercise  Patient's daily activity level:: sedentary (in past, pt was running a few times a week; not able to dot hatt currently.  Pt does try to walk around house)  Patient formally exercises outside of work.: no  Patient can identify forms of physical activity.: yes  Stated forms of physical activity:: any movement performed by muscles that uses energy  Patient can identify reasons why exercise/physical activity is important in diabetes management.: yes  Identified reasons:: relieves stress, strengthens heart, muscles, and bones, lowers blood glucose, blood pressure, and cholesterol, lowers risk of heart disease and stroke  Physical Activity/Exercise Skills Assessment Completed: : Yes  Assessment indicates:: Adequate understanding  Area of need?: No    Medications  Patient is able to describe current diabetes management routine.: yes  Diabetes management routine:: insulin, injectable medications, oral medications (Metformin 500 mg BID; Ozempic 0.5 mg on TH, Novolog--SS (NOT TAKING due to BG<150))  Patient is able to identify current diabetes medications, dosages, and appropriate timing of medications.: yes  Patient understands the purpose of the medications taken for diabetes.: yes  Patient reports problems or concerns with current medication regimen.: no  Medication Skills Assessment Completed:: Yes  Assessment indicates:: Adequate understanding  Area of need?: No    Home Blood Glucose Monitoring  Patient states that blood sugar is checked at home daily.: yes  Monitoring Method:: home glucometer  How often do you check your blood sugar?: 4 times a day  When do you check your blood sugar?: Before breakfast, Before lunch, Before dinner, 2 hours after meal  When you check what is your typical blood sugar range? :  (all BG levels are )  Blood glucose logs:: no  Home Blood Glucose Monitoring Skills Assessment Completed: : Yes  Assessment indicates:: Adequate understanding  Area of need?:  No    Acute Complications  Patient is able to identify types of acute complications: Yes  Patient Identified:: Hypoglycemia, Hyperglycemia  Patient is able to state the basic meaning of hypoglycemia?: Yes  Able to state the blood sugar range for hypoglycemia?: yes  Patient stated range::  (<80)  Patient can identify general symptoms of hypoglycemia: yes  Patient identified:: shakiness  Able to state proper treatment of hypoglycemia?: yes  Patient identified:: 1/2 can soda/fruit juice, 1 tablespoon sugar/honey, 4 glucose tablets  Patient is able to state the basic meaning of hyperglycemia?: Yes  Able to state the blood sugar range for hyperglycemia?: yes  Patient stated range::  (>250)  Patient able to state proper treatment of hyperglycemia?: yes  Patient identified:: take medication as recommended, monitor blood sugar  Acute Complications Skills Assessment Completed: : Yes  Assessment indicates:: Adequate understanding  Area of need?: No    Chronic Complications  Patient can identify major chronic complications of diabetes.: yes  Stated chronic complications:: heart disease/heart attack, kidney disease, neuropathy/nerve damage, retinopathy, stroke  Patient can identify ways to prevent or delay diabetes complications.: yes  Stated ways to prevent complications:: having regular diabetic eye exams, healthy eating and regular activity, controlling blood sugar  Patient is aware that having diabetes increases risk of heart disease?: Yes  Patient is aware that heart disease is the leading cause of death and disability in people with diabetes?: Yes  Patient able to state risk factors for heart disease?: Yes  Patient stated risk factors for heart disease:: Limited activity, Medication non-adherance, Having diabetes, Diet  Patient is taking statin?: No  Do you want more information on Statins?: No  Chronic Complications Skills Assessment Completed: : Yes  Assessment indicates:: Adequate understanding  Area of need?:  No    Psychosocial/Coping  Patient can identify ways of coping with chronic disease.: yes  Patient-stated ways of coping with chronic disease:: support from loved ones  Psychosocial/Coping Skills Assessment Completed: : Yes  Assessment indicates:: Adequate understanding  Area of need?: No    Diabetes Self Support Plan    Assessment Summary and Plan    Based on today's diabetes care assessment, the following areas of need were identified:      Social 12/27/2022   Support No   Access to Mass Media/Tech No   Cognitive/Behavioral Health No   Culture/Mu-ism No   Communication No   Health Literacy No        Clinical 12/27/2022   Medication Adherence No   Lab Compliance No   Nutritional Status No        Diabetes Self-Management Skills 12/27/2022   Diabetes Disease Process/Treatment Options No   Nutrition/Healthy Eating Yes--see care Plan   Physical Activity/Exercise No   Medication No   Home Blood Glucose Monitoring No   Acute Complications No   Chronic Complications No   Psychosocial/Coping No          Today's interventions were provided through individual discussion, instruction, and written materials were provided.      Patient verbalized understanding of instruction and written materials.  Pt was able to return back demonstration of instructions today. Patient understood key points, needs reinforcement and further instruction.     Diabetes Self-Management Care Plan:    Today's Diabetes Self-Management Care Plan was developed with Cyrus's input. Cyrus has agreed to work toward the following goal(s) to improve his/her overall diabetes control.      Care Plan: Diabetes Management   Updates made since 11/27/2022 12:00 AM        Problem: Healthy Eating         Goal: Pt needs to find nutritional product that provides adequate nutrition for weight gain/maintenance    Start Date: 12/27/2022   Expected End Date: 3/27/2023   Priority: High   Barriers: No Barriers Identified   Note:    TASK: wife will contact patient's  "doctors to discuss reflux that pt is experiencing  TASK: wife will contact patient's doctors and Dietitians to discuss which nutritional product would be most beneficial for pt    Pt has been on a number of nutritional products for TF (currently no oral intake at all).  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  Pt has been on glucerna previously, then on 6 Jevity, then Pepamin 1.5  continuous infusion while in hospital.    Currently on Peptamen 5 per day, but pt unable to take all 5; usually gets 3 04 maybe 4 down per day.  Also he is having a lot of residual daily, as well as nausea and vomiting, so not absorbing all nutrients.      Peptamen provides (per 250 ml)  375 kcal, 47 gm total carbs, 17 gm PRO.    By taking 5 as prescribed, pt would be getting 1875 kcal, 235 gm total carbs; 85 gm PRO  By taking 3 per day, pt would is getting  1125 kcal, 141 g, total carbs, 51 gm PRO  Pt seems to be having issue with reflux; states he feels "sweetness" in back of throat  and this causes him to vomit frequently.  Also concerned with amount of residual.  Encouraged pt and wife to contact the MD and RD that prescribes TF and discuss all these issues.  Janel mentioned that they still have a lot of Jevity at home and feels that when pt was taking 6 Jevity, he was able to gain some weight back (5 Jevity helped maintain wt)          Follow Up Plan     Follow up if symptoms worsen or fail to improve.    Today's care plan and follow up schedule was discussed with patient.  Cyrus verbalized understanding of the care plan, goals, and agrees to follow up plan.        The patient was encouraged to communicate with his/her health care provider/physician and care team regarding his/her condition(s) and treatment.  I provided the patient with my contact information today and encouraged to contact me via phone or [facility] Patient Portal as needed.     Length of Visit "   Total Time: 60 Minutes

## 2022-12-27 NOTE — PROGRESS NOTES
NUTRITION NOTE:    RD met with pt briefly after MD appointment where he reports reflux and residuals after his TF. He reports this happens no matter which formula he uses but he is currently using Peptamen 1.5. He has recently been on GlucerCipherCloud, Jevity and Feedback with similar symptoms. He's currently bolusing 3-4 cartons per day. Instructed pt to slow down feeds to half a carton 6x/day to decrease reflux/residuals. Instructed pt to call if symptoms are not resolved with slower rate.    Electronically signed by: Barbara Zayas MBA, JAMIRN, LDN

## 2023-01-03 ENCOUNTER — PATIENT MESSAGE (OUTPATIENT)
Dept: SPEECH THERAPY | Facility: HOSPITAL | Age: 73
End: 2023-01-03
Payer: MEDICARE

## 2023-01-03 ENCOUNTER — OFFICE VISIT (OUTPATIENT)
Dept: RADIATION ONCOLOGY | Facility: CLINIC | Age: 73
End: 2023-01-03
Payer: MEDICARE

## 2023-01-03 ENCOUNTER — TELEPHONE (OUTPATIENT)
Dept: OTOLARYNGOLOGY | Facility: CLINIC | Age: 73
End: 2023-01-03
Payer: MEDICARE

## 2023-01-03 ENCOUNTER — OFFICE VISIT (OUTPATIENT)
Dept: SURGICAL ONCOLOGY | Facility: CLINIC | Age: 73
End: 2023-01-03
Payer: MEDICARE

## 2023-01-03 VITALS — TEMPERATURE: 98 F | HEIGHT: 66 IN | WEIGHT: 129 LBS | RESPIRATION RATE: 16 BRPM | BODY MASS INDEX: 20.73 KG/M2

## 2023-01-03 VITALS
BODY MASS INDEX: 20.55 KG/M2 | DIASTOLIC BLOOD PRESSURE: 69 MMHG | HEART RATE: 74 BPM | RESPIRATION RATE: 18 BRPM | SYSTOLIC BLOOD PRESSURE: 105 MMHG | OXYGEN SATURATION: 99 % | WEIGHT: 127.31 LBS

## 2023-01-03 DIAGNOSIS — C32.9 LARYNX CANCER: Primary | ICD-10-CM

## 2023-01-03 PROCEDURE — 3008F PR BODY MASS INDEX (BMI) DOCUMENTED: ICD-10-PCS | Mod: CPTII,S$GLB,, | Performed by: RADIOLOGY

## 2023-01-03 PROCEDURE — 3008F PR BODY MASS INDEX (BMI) DOCUMENTED: ICD-10-PCS | Mod: CPTII,S$GLB,, | Performed by: OTOLARYNGOLOGY

## 2023-01-03 PROCEDURE — 1159F PR MEDICATION LIST DOCUMENTED IN MEDICAL RECORD: ICD-10-PCS | Mod: CPTII,S$GLB,, | Performed by: OTOLARYNGOLOGY

## 2023-01-03 PROCEDURE — 1101F PT FALLS ASSESS-DOCD LE1/YR: CPT | Mod: CPTII,S$GLB,, | Performed by: RADIOLOGY

## 2023-01-03 PROCEDURE — 99024 POSTOP FOLLOW-UP VISIT: CPT | Mod: S$GLB,,, | Performed by: OTOLARYNGOLOGY

## 2023-01-03 PROCEDURE — 3288F FALL RISK ASSESSMENT DOCD: CPT | Mod: CPTII,S$GLB,, | Performed by: OTOLARYNGOLOGY

## 2023-01-03 PROCEDURE — 1159F MED LIST DOCD IN RCRD: CPT | Mod: CPTII,S$GLB,, | Performed by: OTOLARYNGOLOGY

## 2023-01-03 PROCEDURE — 1111F PR DISCHARGE MEDS RECONCILED W/ CURRENT OUTPATIENT MED LIST: ICD-10-PCS | Mod: CPTII,S$GLB,, | Performed by: RADIOLOGY

## 2023-01-03 PROCEDURE — 99215 PR OFFICE/OUTPT VISIT, EST, LEVL V, 40-54 MIN: ICD-10-PCS | Mod: S$GLB,,, | Performed by: RADIOLOGY

## 2023-01-03 PROCEDURE — 1160F PR REVIEW ALL MEDS BY PRESCRIBER/CLIN PHARMACIST DOCUMENTED: ICD-10-PCS | Mod: CPTII,S$GLB,, | Performed by: RADIOLOGY

## 2023-01-03 PROCEDURE — 1126F AMNT PAIN NOTED NONE PRSNT: CPT | Mod: CPTII,S$GLB,, | Performed by: OTOLARYNGOLOGY

## 2023-01-03 PROCEDURE — 1126F PR PAIN SEVERITY QUANTIFIED, NO PAIN PRESENT: ICD-10-PCS | Mod: CPTII,S$GLB,, | Performed by: OTOLARYNGOLOGY

## 2023-01-03 PROCEDURE — 99999 PR PBB SHADOW E&M-EST. PATIENT-LVL III: ICD-10-PCS | Mod: PBBFAC,,, | Performed by: OTOLARYNGOLOGY

## 2023-01-03 PROCEDURE — 3008F BODY MASS INDEX DOCD: CPT | Mod: CPTII,S$GLB,, | Performed by: RADIOLOGY

## 2023-01-03 PROCEDURE — 1159F MED LIST DOCD IN RCRD: CPT | Mod: CPTII,S$GLB,, | Performed by: RADIOLOGY

## 2023-01-03 PROCEDURE — 1101F PR PT FALLS ASSESS DOC 0-1 FALLS W/OUT INJ PAST YR: ICD-10-PCS | Mod: CPTII,S$GLB,, | Performed by: RADIOLOGY

## 2023-01-03 PROCEDURE — 1160F PR REVIEW ALL MEDS BY PRESCRIBER/CLIN PHARMACIST DOCUMENTED: ICD-10-PCS | Mod: CPTII,S$GLB,, | Performed by: OTOLARYNGOLOGY

## 2023-01-03 PROCEDURE — 3288F FALL RISK ASSESSMENT DOCD: CPT | Mod: CPTII,S$GLB,, | Performed by: RADIOLOGY

## 2023-01-03 PROCEDURE — 3074F SYST BP LT 130 MM HG: CPT | Mod: CPTII,S$GLB,, | Performed by: RADIOLOGY

## 2023-01-03 PROCEDURE — 1101F PT FALLS ASSESS-DOCD LE1/YR: CPT | Mod: CPTII,S$GLB,, | Performed by: OTOLARYNGOLOGY

## 2023-01-03 PROCEDURE — 99024 PR POST-OP FOLLOW-UP VISIT: ICD-10-PCS | Mod: S$GLB,,, | Performed by: OTOLARYNGOLOGY

## 2023-01-03 PROCEDURE — 1160F RVW MEDS BY RX/DR IN RCRD: CPT | Mod: CPTII,S$GLB,, | Performed by: RADIOLOGY

## 2023-01-03 PROCEDURE — 1159F PR MEDICATION LIST DOCUMENTED IN MEDICAL RECORD: ICD-10-PCS | Mod: CPTII,S$GLB,, | Performed by: RADIOLOGY

## 2023-01-03 PROCEDURE — 1111F DSCHRG MED/CURRENT MED MERGE: CPT | Mod: CPTII,S$GLB,, | Performed by: RADIOLOGY

## 2023-01-03 PROCEDURE — 99215 OFFICE O/P EST HI 40 MIN: CPT | Mod: S$GLB,,, | Performed by: RADIOLOGY

## 2023-01-03 PROCEDURE — 1101F PR PT FALLS ASSESS DOC 0-1 FALLS W/OUT INJ PAST YR: ICD-10-PCS | Mod: CPTII,S$GLB,, | Performed by: OTOLARYNGOLOGY

## 2023-01-03 PROCEDURE — 1160F RVW MEDS BY RX/DR IN RCRD: CPT | Mod: CPTII,S$GLB,, | Performed by: OTOLARYNGOLOGY

## 2023-01-03 PROCEDURE — 3008F BODY MASS INDEX DOCD: CPT | Mod: CPTII,S$GLB,, | Performed by: OTOLARYNGOLOGY

## 2023-01-03 PROCEDURE — 3288F PR FALLS RISK ASSESSMENT DOCUMENTED: ICD-10-PCS | Mod: CPTII,S$GLB,, | Performed by: OTOLARYNGOLOGY

## 2023-01-03 PROCEDURE — 3288F PR FALLS RISK ASSESSMENT DOCUMENTED: ICD-10-PCS | Mod: CPTII,S$GLB,, | Performed by: RADIOLOGY

## 2023-01-03 PROCEDURE — 3074F PR MOST RECENT SYSTOLIC BLOOD PRESSURE < 130 MM HG: ICD-10-PCS | Mod: CPTII,S$GLB,, | Performed by: RADIOLOGY

## 2023-01-03 PROCEDURE — 3078F PR MOST RECENT DIASTOLIC BLOOD PRESSURE < 80 MM HG: ICD-10-PCS | Mod: CPTII,S$GLB,, | Performed by: RADIOLOGY

## 2023-01-03 PROCEDURE — 3078F DIAST BP <80 MM HG: CPT | Mod: CPTII,S$GLB,, | Performed by: RADIOLOGY

## 2023-01-03 PROCEDURE — 99999 PR PBB SHADOW E&M-EST. PATIENT-LVL III: CPT | Mod: PBBFAC,,, | Performed by: OTOLARYNGOLOGY

## 2023-01-03 NOTE — TELEPHONE ENCOUNTER
Called wife. Pt has appt with Dr. James at 230 pm today. Dr. James would like to see if pt is able to come in this morning instead? Dr. James has to help in surgery this afternoon at [facility]. Wife will contact pt to see if he can come in early and will call back. Thanks, Concepción

## 2023-01-03 NOTE — PROGRESS NOTES
Cyrus Batres Jr., presents roughly 2 months status post total pharyngectomy and total glossectomy.  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  He is doing well overall.      On exam, his neck incision is well healed.  Staples were removed.  His stoma is patent.  The trach tube was removed.  His fistula has closed.      Assessment plan:  Doing well.  He is to see Dr. Soto this afternoon.  I do feel he would benefit from radiation if he can be safely administered.  Also consider resuming immunotherapy.  Will refer to speech to assist with fitting with stoma appliances.  Return to see me in 1 month.

## 2023-01-03 NOTE — PROGRESS NOTES
Cyrus Batres Jr.  [id number]  1950  1/3/2023  No referring provider defined for this encounter.    REASON FOR CONSULTATION: [id number] R1 mod-poor diff p16(-) SCCA BOT    TREATMENT GOAL: concurrent (with chemotherapy)    HISTORY OF PRESENT ILLNESS:   Cyrus Batres Jr. is a 72 y.o. male never smoker who presented with intermittent hoarseness and weakening of the voice throughout the day with persistent cough. He has a +FHx of similar diagnosis in his father who was a smoker (he reports benign--laryngeal polyps?).     Patient was evaluated by Dr. Xiao with DFL noting irregularity at L TVC and short interval f/u with microlaryngoscopy where he noted the lesion at the superior surface of the left vocal cord without definitive invasion of the anterior commissure and possibly 1 mm of subglottic extension. Biopsy from the left true vocal cord demonstrated severe dysplastic changes without definitive invasive carcinoma though suspicious.  The left anterior commissure biopsy demonstrated moderate dysplasia without malignancy. CT of the neck and soft tissues appreciating an enhancing 6 mm focus at the superior aspect of the left vocal cord near the anterior commissure.     Follow-up DFL was suspicious for invasive tumor with four month follow-up CT of the neck and soft tissues demonstrating enlargement to 1.4 x 1.1 x 0.7 cm without evidence of pathologic lymphadenopathy.  Dr. Xiao referred to Dr. Noel who performed FLV suspicious for malignancy then microlaryngoscopy noting bulky tumor extending into the left true vocal fold, crossing the anterior commissure to involve the left false vocal cord with infraglottic extent x 8 mm. Aggressive debulking revealed the tumor to have deeply invaded with pathology from left true vocal cord demonstrating moderately differentiated SCCA without lymphovascular or perineural invasion.     CT of the chest was clear.  His case was reviewed by multidisciplinary tumor board that recommended  definitive radiotherapy.  I discussed in detail with Dr. Noel who reports extensive debulking of the tumor.     Mr. Batres completed IMRT to his larynx and bilateral necks via SiB totaling 6996 cGy on 10/30/2020.     Following completion of radiotherapy patient has continued under the care of Dr. Noel requiring microlaryngoscopy with stripping of persistent disease.  He has continued on surveillance with suggestion of progressive disease that progressed to hemoptysis.      He was ultimately taken for salvage laryngectomy by Dr. James:              - 4.2cm g1-2 keratinizing SCCa invading the left lobe of thyroid gland.              - margin close at 1 mm near right thyroid cartilage              - 0/50 LNs              - pT4aN0     I met with him postoperatively as did Dr. Khoobehi, and no further adjuvant radiotherapy or systemic therapy was recommended.     Patient has been following with PT/ST and presented with hemoptysis.  CT of the neck was remarkable for 2.1 cm enhancing mass at the left base of tongue.  Dr. James took the patient for DL revealing a mass at the base of the tongue at junction of the neopharynx, crossing midline and extending to the lateral pharyngeal wall, biopsy confirming a poorly differentiated SCCA, p16 negative.  PET-CT confirmed a 2.9 x 2.5 cm left base of tongue mass, SUV 11.4 with inferior nodular extension measuring 1.3 mm in size, SUV 8.3.  There was no evidence of regional lymphadenopathy or distant disease.     He has presented multiple times to the hospital with recurrent bleeding on Eliquis.  CTA neck was negative for arterial involvement. He was evaluated by IR for potential lingual artery embolization, ultimately without intervention.  Arteriogram demonstrated hypervascular tumor without extravasation, pseudoaneurysm or fistula.    I met with him in May 2022 and demonstrated recurrent disease to be within high-dose region with risk of reirradiation, and advised  consideration of salvage surgery.  He met with Dr. James and ultimately declined surgery.  After discussing with Drs. Khoobehi and Checo, he was placed on systemic therapy of carboplatin, 5 FU, Keytruda completing 3 cycles. CTA N demonstrated progression of the hypo pharyngeal mass, extending to the floor of the mouth on the left and medial margin of the mandible.  PET-CT confirmed no distant disease.  He was seen by my partner Dr. Lucero and consulted with Dr. Tamez before returning to Dr. James, ultimately agreeing to resection.    He was taken for pharyngectomy and glossectomy with ALT free flap by Haleigh James and Tanner, 11/09/2022:  - extensive tumor extending out into the neck within level 2.  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  He was discharged and was seen by Dr. Tamez who is considering him for further systemic therapy due to positive margin.  He returns to discuss.  Follow-up with Dr. James today who advised would benefit from RT.    PORT removed.  Antibiotics completed.  Denies fever, chills, chest pain, shortness of breath, cough or hemoptysis.  Able to tolerate full 1 carton of feeds per session.  Strength improving.    Review of systems otherwise negative unless indicated in HPI.    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    PEG (percutaneous endoscopic gastrostomy) adjustment/replacement/removal 11/22/2022     Postoperative 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    Tongue cancer     Type 2 diabetes mellitus, without long-term current use of insulin 11/22/2022     Past Surgical History:   Procedure Laterality Date    DIRECT LARYNGOBRONCHOSCOPY N/A 12/27/2021    Procedure: LARYNGOSCOPY, DIRECT, WITH BRONCHOSCOPY;  Surgeon: Flex Espinosa MD;  [facility];  Service: ENT;  Laterality: N/A;    DIRECT LARYNGOSCOPY  11/9/2022    Procedure: LARYNGOSCOPY, DIRECT;  Surgeon: Jesse James MD;  [facility];  Service: ENT;;    DISSECTION OF NECK Bilateral 1/6/2022    Procedure: DISSECTION, NECK;  Surgeon: Jesse James MD;  [facility];  Service: ENT;  Laterality: Bilateral;    DISSECTION OF NECK Bilateral 11/9/2022    Procedure: DISSECTION, NECK;  Surgeon: Jesse James MD;  Location: [facility] OR [address] [place] [address];  Service: ENT;  Laterality: Bilateral;    FLAP PROCEDURE Right 1/6/2022    Procedure: CREATION, FREE FLAP;  Surgeon: Alise Hart MD;  [address]  Service: ENT;  Laterality: Right;  Ischemic start 1351  Ischemic stop 1502    FLAP PROCEDURE Left 11/9/2022    Procedure: CREATION, FREE FLAP, ALT;  Surgeon: Alise Hart MD;  [address];  Service: ENT;  Laterality: Left;    GLOSSECTOMY Bilateral 11/9/2022    Procedure: TOTAL GLOSSECTOMY;  Surgeon: Jesse James MD;  Location: [address];  Service: ENT;  Laterality: Bilateral;    INSERTION OF TUNNELED CENTRAL VENOUS CATHETER (CVC) WITH SUBCUTANEOUS PORT N/A 6/9/2022    Procedure: [id number];  Surgeon: Jesus Viera MD;  Location: [facility] OR;  Service: General;  Laterality: N/A;    LARYNGECTOMY N/A 1/6/2022    Procedure: LARYNGECTOMY;  Surgeon: Jesse James MD;  Location: [address];  Service: ENT;  Laterality: N/A;    LARYNGOSCOPY N/A 8/4/2020    Procedure: Suspension microlaryngoscopy with biopsy, possible KTP laser treatment/excision;  Surgeon: Stew Noel MD;   [facility]: [facility];  Service: ENT;  Laterality: N/A;  Microscope, telescopes, tower, microinstruments, KTP laser, rep conf# [id number] IC 7/28.    LARYNGOSCOPY N/A 3/16/2021    Procedure: Suspension microlaryngoscopy with excision of lesion, possible CO2 laser;  Surgeon: Stew Noel MD;  [facility];  Service: ENT;  Laterality: N/A;  Microscope, telescopes, tower, microinstruments, CO2 laser, rep conf# [id number] IC 3/4.    LARYNGOSCOPY N/A 4/1/2021    Procedure: Suspension microlaryngoscopy with KTP laser excision of lesion;  Surgeon: tSew Noel MD;  [facility];  Service: ENT;  Laterality: N/A;  Microscope, telescopes, tower, microinstruments, 70 degree scope, vocal fold , KTP laser, rep conf# [id number] BC    LARYNGOSCOPY N/A 12/9/2021    Procedure: Suspension microlaryngoscopy with biopsy;  Surgeon: Stew Noel MD;  [facility];  Service: ENT;  Laterality: N/A;  Microscope, telescopes, tower, microinstruments    LARYNGOSCOPY N/A 1/6/2022    Procedure: LARYNGOSCOPY;  Surgeon: Jesse James MD;  [facility];  Service: ENT;  Laterality: N/A;    LARYNGOSCOPY N/A 4/27/2022    Procedure: LARYNGOSCOPY WITH BIOPSY;  Surgeon: Jesse James MD;  [facility];  Service: ENT;  Laterality: N/A;    MICROLARYNGOSCOPY N/A 3/17/2020    Procedure: MICROLARYNGOSCOPY;  Surgeon: Jung Xiao MD;  Location: [facility] OR;  Service: ENT;  Laterality: N/A;  Laser Microlaryngoscopy  NEED TO SCHEDULE LASER from [facility] [id number]    PHARYNGECTOMY  11/9/2022    Procedure: TOTAL PHARYNGECTOMY;  Surgeon: Jesse James MD;  [facility];  Service: ENT;;    REIMPLANTATION OF PARATHYROID TISSUE N/A 1/6/2022    Procedure: REIMPLANTATION, PARATHYROID TISSUE;  Surgeon: Jesse James MD;  Location: [facility] OR [address] [place] [address];  Service: ENT;  Laterality: N/A;    SKIN SPLIT GRAFT Right 11/9/2022    Procedure:  APPLICATION, GRAFT, SKIN, SPLIT-THICKNESS;  Surgeon: Jesse James MD;  Location: [facility] OR [facility];  Service: ENT;  Laterality: Right;    THYROIDECTOMY  1/6/2022    Procedure: THYROIDECTOMY;  Surgeon: Jesse James MD;  Location: [facility] OR [facility];  Service: ENT;;    TRACHEOSTOMY N/A 12/27/2021    Procedure: CREATION, TRACHEOSTOMY;  Surgeon: Flex Espinosa MD;  Location: [facility] OR [facility];  Service: ENT;  Laterality: N/A;     Social History     Socioeconomic History    Marital status:      Spouse name: Janel Batres    Number of children: 2   Occupational History    Occupation: [employer]     Employer: [employer]   Tobacco Use    Smoking status: Never    Smokeless tobacco: Never   Substance and Sexual Activity    Alcohol use: Not Currently     Comment: occasional    Drug use: No    Sexual activity: Yes     Partners: Female   Social History Narrative    ** Merged History Encounter **         2 children from his 1st wife     Social Determinants of Health     Financial Resource Strain: Low Risk     Difficulty of Paying Living Expenses: Not very hard   Food Insecurity: Unknown    Worried About Running Out of Food in the Last Year: Never true   Transportation Needs: No Transportation Needs    Lack of Transportation (Medical): No    Lack of Transportation (Non-Medical): No   Physical Activity: Unknown    Days of Exercise per Week: Patient refused    Minutes of Exercise per Session: Patient refused   Stress: Stress Concern Present    Feeling of Stress : To some extent   Social Connections: Unknown    Frequency of Communication with Friends and Family: Patient refused    Frequency of Social Gatherings with Friends and Family: Patient refused    Attends Voodoo Services: Patient refused    Active Member of Clubs or Organizations: Patient refused    Attends Club or Organization Meetings: Patient refused    Marital Status:    Housing Stability: Low Risk     Unable to Pay for Housing in the Last Year:  "No    Number of Places Lived in the Last Year: 1    Unstable Housing in the Last Year: No     Family History   Problem Relation Age of Onset    Abnormal EKG Mother     Diabetes Father     Heart disease Father     Hypertension Father        PRIOR HISTORY OF CHEMOTHERAPY OR RADIOTHERAPY: Please see HPI for patients prior oncologic history.    Medication List with Changes/Refills   Current Medications    ACETAMINOPHEN (TYLENOL) 325 MG TABLET    Take 325 mg by mouth every 6 (six) hours as needed for Pain.    BD ULTRA-FINE YULISSA PEN NEEDLE 32 GAUGE X 5/32" NDLE    To be used with Novolog pen up to 4x a day    CALCITRIOL (ROCALTROL) 1 MCG/ML SOLUTION    Take 0.25 mLs (0.25 mcg total) by Per G Tube route once daily.    CALCITRIOL (ROCALTROL) 1 MCG/ML SOLUTION    0.25 mLs (0.25 mcg total) by Per G Tube route once daily.    CALCIUM CARBONATE (TUMS) 200 MG CALCIUM (500 MG) CHEWABLE TABLET    2 tablets (1,000 mg total) by Per G Tube route 5 (five) times daily.    CALCIUM CARBONATE 500 MG/5 ML (1,250 MG/5 ML)    10 mLs (1,000 mg total) by Per G Tube route 3 (three) times daily with meals.    ESOMEPRAZOLE (NEXIUM) 40 MG CAPSULE    40 mg before breakfast.    FAMOTIDINE (PEPCID) 40 MG/5 ML (8 MG/ML) SUSPENSION    2.5 mLs (20 mg total) by Per G Tube route 2 (two) times daily.    GLUCAGON (GLUCAGEN HYPOKIT) 1 MG SOLR    Inject 1 mg into the muscle as needed (FOR HYPOGLYCEMIA).    IBUPROFEN (ADVIL,MOTRIN) 200 MG TABLET    Take 200 mg by mouth every 6 (six) hours as needed for Pain.    INSULIN ASPART U-100 (NOVOLOG FLEXPEN U-100 INSULIN) 100 UNIT/ML (3 ML) INPN PEN    Inject 0-10 Units into the skin as needed; Add correction scale if needed while on TF.  Blood sugar 180-230 add 2 units, 231-280 +4 units, 281-330 +6 units, 331-380 +8 units, >380 +10 units.  MDD 40 units    LEVOTHYROXINE (SYNTHROID) 100 MCG TABLET    1 tablet (100 mcg total) by Per G Tube route before breakfast.    LOSARTAN (COZAAR) 100 MG TABLET    Take 0.5 tablets (50 " mg total) by mouth once daily.    MECLIZINE (ANTIVERT) 25 MG TABLET    1 tablet (25 mg total) by Per G Tube route 3 (three) times daily as needed.    METFORMIN (GLUCOPHAGE) 500 MG TABLET    Take 1 tablet (500 mg total) by mouth 2 (two) times daily with meals.    TRAZODONE (DESYREL) 50 MG TABLET    TAKE 1 TABLET BY MOUTH NIGHTLY AS NEEDED FOR INSOMNIA.    ZOLPIDEM (AMBIEN) 5 MG TAB    1 tablet (5 mg total) by Per G Tube route nightly as needed (difficult with sleep).     Review of patient's allergies indicates:   Allergen Reactions    Lovastatin Itching    Pollen extracts     Lovastatin Rash     Not confirmed but pt skeptical       QUALITY OF LIFE: 80%- Normal Activity with Effort: Some Symptoms of Disease    Vitals:    01/03/23 1320   BP: 105/69   Pulse: 74   Resp: 18   SpO2: 99%   Weight: 57.7 kg (127 lb 4.8 oz)     Body mass index is 20.55 kg/m².    PHYSICAL EXAM:   GENERAL: alert; in no apparent distress.   HEAD: normocephalic, atraumatic.  EYES: pupils are equal, round, reactive to light and accommodation. Sclera anicteric. Conjunctiva not injected.   NOSE/THROAT: no nasal erythema or rhinorrhea. Oropharynx pink, without erythema, ulcerations or thrush. Aphonic  NECK: no cervical motion rigidity; supple with no masses.  Stoma c/d/I; incisions healed  CHEST: on room air with normal work of breathing without using accessory muscles of respiration.  CARDIOVASCULAR: regular rate and rhythm  ABDOMEN: soft, nontender, nondistended.   MUSCULOSKELETAL: no tenderness to palpation along the spine or scapulae. Normal range of motion.  NEUROLOGIC: cranial nerves II-XII intact bilaterally. Strength 5/5 in bilateral upper and lower extremities. No sensory deficits appreciated. Normal gait  EXTREMITIES: no clubbing, cyanosis, edema.  SKIN: no erythema, rashes, ulcerations noted.     REVIEW OF IMAGING/PATHOLOGY/LABS: Please see HPI. All images reviewed personally by dictating physician.     ASSESSMENT: Cyrus Batres Jr. is a 72  y.o. male with stage [id number] R1 mod-poor diff p16(-) SCCA BOT.  PLAN:  Cyrus Batres Jr. underwent pharyngectomy and glossectomy revealing a 4.5 cm grade 2/3 SCCA with 0/4LNs involved and positive left superior pharyngeal margin.  We again discussed indications for adjuvant treatment, noting prior growth with chemo/immune therapy.  In considering adjuvant radiotherapy, with demonstrated prior radio persistence--I advise adjuvant treatment for positive margin at lowest burden of disease rather than waiting for gross recurrence.  I provided data in the postoperative setting demonstrating benefit to chemosensitization with positive margins and extranodal extension.  I will ask Dr. Tamez to see him for consideration.  He would likely require port replacement.  I will also speak with Dr. James for post-op clearance and to best clarify region of (+) margin.  I was clear with the patient today that there is no evidence of distant metastases and that our best chance of curative intent would be to sterilize the positive margin.  He may be a candidate for adjuvant immune therapy at completion.  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  He will continue to follow with dietitian with 100% of intake by PEG tube.    I carefully explained the process of simulation and treatment delivery with weekly physician visits. Patient wishes to proceed.     We discussed the risks and benefits of re-irradiation treatment and have gone over in detail the acute and late toxicities of radiation therapy to the head and neck. The patient expressed understanding. Consent at [facility].     The patient has our contact information and understands that they are free to contact us at any time with questions or concerns regarding radiation therapy.     DISPOSITION: RTC FOR CT [facility]     I have personally seen and evaluated this patient with a high complexity diagnosis.      Greater than 60  minutes were dedicated to reviewing/interpreting pertinent laboratory/imaging/pathology as well as prior consultations; reviewing and performing history and physical; counseling patient on oncologic recommendations; documentation in the electronic medical record including ordering of additional tests and/or radiation treatment protocol; and coordination of care with physicians with referrals placed as appropriate.     COVID-19 precautions and [facility] policy discussed.      PHYSICIAN: Matheus Portillo Jr, MD    Thank you for the opportunity to meet and consult with Cyrus Batres Jr..   Please feel free to contact me to discuss the above recommendation further.

## 2023-01-04 ENCOUNTER — OUTPATIENT CASE MANAGEMENT (OUTPATIENT)
Dept: ADMINISTRATIVE | Facility: OTHER | Age: 73
End: 2023-01-04
Payer: MEDICARE

## 2023-01-04 ENCOUNTER — TELEPHONE (OUTPATIENT)
Dept: HEMATOLOGY/ONCOLOGY | Facility: CLINIC | Age: 73
End: 2023-01-04

## 2023-01-04 DIAGNOSIS — C32.9 LARYNGEAL CANCER: Primary | ICD-10-CM

## 2023-01-04 NOTE — PROGRESS NOTES
Outpatient Care Management  Plan of Care Follow Up Visit    Patient: Cyrus Batres Jr.  MRN: [id number]  Date of Service: 01/04/2023  Completed by: Eugenia Agudelo RN  Referral Date: 09/02/2022    Reason for Visit   Patient presents with    Update Plan Of Care       Brief Summary: 01/04/23-Called and placed on speaker phone. He is having no nausea or diarrhea. He able to take three cartons per peg tube. He is trying to advance to 5 cartons a day. He has no s/s of infection. He is feeling better but still having insomnia. He is not able to swallow. He has an brittany on his phone for communication and a board. New port - referral placed on 01/04/23 by provider. He is  setting up weekly cisplat chemo medication.   Appt on 01/03/23 with Dr. Izaguirre,Surgical oncology head and neck for 2 month status post total pharyngectomy and total glossectomy. Pharyngo cutaneous fistula has healed, neck incision is healed, staples were removed. Referring to speech therapy.Dr. James thinks he would benefit from radiation therapy. Message from Peter Cole in patient portal about stomas supplies and she met with him at the clinic.  Appt with Dr. Bandar Jr,radiation oncologist on 01/03/23. He is finished with his antibiotics with no s/s of infection. He will require new port placement for therapy.  Appt on 12/27/22 with Dr. Tamez, Hematology/oncology who is having labs weekly.Appt on 12/27/22 with dietician, Jolynn Goel to find a nutritional product that provides adequate nutrition for weight gain/maintenance. He is currently on Peptamen per peg tube.   Next Steps: Follow up in two weeks   Message to peter Cole about being able to swallow water and apps for speech on phone.

## 2023-01-04 NOTE — TELEPHONE ENCOUNTER
----- Message from Venu Tamez MD sent at 1/4/2023 12:37 PM CST -----  Yes sir    Cc: Elyssa - let's set up the weekly cisplat   ----- Message -----  From: Matheus Portillo Jr., MD  Sent: 1/4/2023   6:40 AM CST  To: Venu Tamez MD, Briana Martinez NP, #    Great; let's do it. Cleared by Erika as well, radha.    PB--CT SIM with con Monday; start following week  ----- Message -----  From: Venu Tamez MD  Sent: 1/3/2023   5:11 PM CST  To: Briana Martinez NP, Matheus Portillo Jr., MD    Sounds like a plan    Ccc: Elyssa - let's get it set up  ----- Message -----  From: Matheus Portillo Jr., MD  Sent: 1/3/2023   3:01 PM CST  To: Venu Tamez MD, Briana Martinez NP    Sensitizing cisplatin? Needs new PORT

## 2023-01-05 ENCOUNTER — TELEPHONE (OUTPATIENT)
Dept: SURGERY | Facility: CLINIC | Age: 73
End: 2023-01-05
Payer: MEDICARE

## 2023-01-05 ENCOUNTER — TELEPHONE (OUTPATIENT)
Dept: HEMATOLOGY/ONCOLOGY | Facility: CLINIC | Age: 73
End: 2023-01-05
Payer: MEDICARE

## 2023-01-05 ENCOUNTER — PATIENT MESSAGE (OUTPATIENT)
Dept: FAMILY MEDICINE | Facility: CLINIC | Age: 73
End: 2023-01-05
Payer: MEDICARE

## 2023-01-05 ENCOUNTER — TELEPHONE (OUTPATIENT)
Dept: PAIN MEDICINE | Facility: CLINIC | Age: 73
End: 2023-01-05
Payer: MEDICARE

## 2023-01-05 NOTE — TELEPHONE ENCOUNTER
----- Message from Dianna Rowland sent at 1/5/2023 11:47 AM CST -----  Contact: pt wife [phone]  Type:  Patient Returning Call    Who Called:  Pt wife  Who Left Message for Patient:  N/A  Does the patient know what this is regarding?:  returning call  Best Call Back Number:  [phone]  Additional Information:  Pt wife is returning missed call.  Please call back to advise.

## 2023-01-05 NOTE — TELEPHONE ENCOUNTER
----- Message from Dianna Rowland sent at 1/5/2023 11:47 AM CST -----  Contact: pt wife [phone]  Type:  Patient Returning Call    Who Called:  Pt wife  Who Left Message for Patient:  N/A  Does the patient know what this is regarding?:  returning call  Best Call Back Number:  [phone]  Additional Information:  Pt wife is returning missed call.  Please call back to advise.

## 2023-01-05 NOTE — TELEPHONE ENCOUNTER
[facility] referral scheduled with wife, Janel: 1/11/2023 1500 , [facility]. She asks about sooner appointment. Explained staff message will be sent to  and staff will contact her. She thanks for the call.

## 2023-01-06 ENCOUNTER — PATIENT MESSAGE (OUTPATIENT)
Dept: SPEECH THERAPY | Facility: HOSPITAL | Age: 73
End: 2023-01-06
Payer: MEDICARE

## 2023-01-09 ENCOUNTER — LAB VISIT (OUTPATIENT)
Dept: LAB | Facility: HOSPITAL | Age: 73
End: 2023-01-09
Attending: INTERNAL MEDICINE
Payer: MEDICARE

## 2023-01-09 ENCOUNTER — PATIENT MESSAGE (OUTPATIENT)
Dept: SPEECH THERAPY | Facility: HOSPITAL | Age: 73
End: 2023-01-09
Payer: MEDICARE

## 2023-01-09 ENCOUNTER — HOSPITAL ENCOUNTER (OUTPATIENT)
Dept: PREADMISSION TESTING | Facility: HOSPITAL | Age: 73
Discharge: HOME | End: 2023-01-09
Attending: SURGERY
Payer: MEDICARE

## 2023-01-09 VITALS
BODY MASS INDEX: 20.44 KG/M2 | HEIGHT: 66 IN | RESPIRATION RATE: 14 BRPM | OXYGEN SATURATION: 100 % | TEMPERATURE: 97 F | WEIGHT: 127.19 LBS | SYSTOLIC BLOOD PRESSURE: 112 MMHG | HEART RATE: 85 BPM | DIASTOLIC BLOOD PRESSURE: 70 MMHG

## 2023-01-09 DIAGNOSIS — R93.89 ABNORMAL CT SCAN, NECK: ICD-10-CM

## 2023-01-09 DIAGNOSIS — C10.9 OROPHARYNGEAL CANCER: Primary | ICD-10-CM

## 2023-01-09 DIAGNOSIS — C01 PRIMARY CANCER OF BASE OF TONGUE: ICD-10-CM

## 2023-01-09 DIAGNOSIS — C32.9 LARYNX CANCER: ICD-10-CM

## 2023-01-09 LAB
ALBUMIN SERPL BCP-MCNC: 4.4 G/DL (ref 3.5–5.2)
ALP SERPL-CCNC: 469 U/L (ref 55–135)
ALT SERPL W/O P-5'-P-CCNC: 55 U/L (ref 10–44)
ANION GAP SERPL CALC-SCNC: 9 MMOL/L (ref 8–16)
AST SERPL-CCNC: 51 U/L (ref 10–40)
BASOPHILS # BLD AUTO: 0.02 K/UL (ref 0–0.2)
BASOPHILS NFR BLD: 0.4 % (ref 0–1.9)
BILIRUB SERPL-MCNC: 0.9 MG/DL (ref 0.1–1)
BUN SERPL-MCNC: 19 MG/DL (ref 8–23)
CALCIUM SERPL-MCNC: 8.6 MG/DL (ref 8.7–10.5)
CHLORIDE SERPL-SCNC: 102 MMOL/L (ref 95–110)
CO2 SERPL-SCNC: 26 MMOL/L (ref 23–29)
CREAT SERPL-MCNC: 0.8 MG/DL (ref 0.5–1.4)
DIFFERENTIAL METHOD: ABNORMAL
EOSINOPHIL # BLD AUTO: 0.1 K/UL (ref 0–0.5)
EOSINOPHIL NFR BLD: 2.6 % (ref 0–8)
ERYTHROCYTE [DISTWIDTH] IN BLOOD BY AUTOMATED COUNT: 15.3 % (ref 11.5–14.5)
EST. GFR  (NO RACE VARIABLE): >60 ML/MIN/1.73 M^2
GLUCOSE SERPL-MCNC: 109 MG/DL (ref 70–110)
HCT VFR BLD AUTO: 32.3 % (ref 40–54)
HGB BLD-MCNC: 10.6 G/DL (ref 14–18)
IMM GRANULOCYTES # BLD AUTO: 0.02 K/UL (ref 0–0.04)
IMM GRANULOCYTES NFR BLD AUTO: 0.4 % (ref 0–0.5)
LYMPHOCYTES # BLD AUTO: 0.6 K/UL (ref 1–4.8)
LYMPHOCYTES NFR BLD: 12.7 % (ref 18–48)
MCH RBC QN AUTO: 31.5 PG (ref 27–31)
MCHC RBC AUTO-ENTMCNC: 32.8 G/DL (ref 32–36)
MCV RBC AUTO: 96 FL (ref 82–98)
MONOCYTES # BLD AUTO: 0.3 K/UL (ref 0.3–1)
MONOCYTES NFR BLD: 7.1 % (ref 4–15)
NEUTROPHILS # BLD AUTO: 3.6 K/UL (ref 1.8–7.7)
NEUTROPHILS NFR BLD: 76.8 % (ref 38–73)
NRBC BLD-RTO: 0 /100 WBC
PLATELET # BLD AUTO: 223 K/UL (ref 150–450)
PMV BLD AUTO: 10.3 FL (ref 9.2–12.9)
POTASSIUM SERPL-SCNC: 4.1 MMOL/L (ref 3.5–5.1)
PROT SERPL-MCNC: 7.9 G/DL (ref 6–8.4)
RBC # BLD AUTO: 3.37 M/UL (ref 4.6–6.2)
SODIUM SERPL-SCNC: 137 MMOL/L (ref 136–145)
T4 FREE SERPL-MCNC: 0.73 NG/DL (ref 0.71–1.51)
TSH SERPL DL<=0.005 MIU/L-ACNC: 22.84 UIU/ML (ref 0.34–5.6)
WBC # BLD AUTO: 4.63 K/UL (ref 3.9–12.7)

## 2023-01-09 PROCEDURE — 36415 COLL VENOUS BLD VENIPUNCTURE: CPT | Performed by: INTERNAL MEDICINE

## 2023-01-09 PROCEDURE — 84439 ASSAY OF FREE THYROXINE: CPT | Performed by: INTERNAL MEDICINE

## 2023-01-09 PROCEDURE — 84443 ASSAY THYROID STIM HORMONE: CPT | Performed by: INTERNAL MEDICINE

## 2023-01-09 PROCEDURE — 80053 COMPREHEN METABOLIC PANEL: CPT | Performed by: INTERNAL MEDICINE

## 2023-01-09 PROCEDURE — 85025 COMPLETE CBC W/AUTO DIFF WBC: CPT | Performed by: INTERNAL MEDICINE

## 2023-01-09 NOTE — DISCHARGE INSTRUCTIONS
INSTRUCTIONS  To confirm your doctor has scheduled your surgery for:    COVID TESTING SCHEDULED:     Morning of surgery please check in with registration near Parking Garage Entrance then proceed to Outpatient Surgery Department.    Preop nurses will call the afternoon prior to surgery between 4:00 and 6:00 PM with your final arrival time.  PLEASE NOTE:  The surgery schedule has many variables which may affect the time of your surgery case. Family members should be available if your surgery time changes. Plan to be here the day of your procedure between 4-6 hours.    TAKE ONLY THESE MEDICATIONS WITH A SMALL SIP OF WATER THE MORNING OF SURGERY: see list    DO NOT TAKE THESE MEDICATIONS 5-7 DAYS PRIOR to your procedure per your surgeon's request: ASPIRIN, ALEVE, BC powder, CHANCE SELTZER, IBUPROFEN, FISH OIL, VITAMIN E, OR HERBALS   (May take Tylenol)    If you are prescribed any types of blood thinners (Aspirin, Coumadin, Plavix, Pradaxa, Xarelto, Aggrenox, Effient, Eliquis, Savasya, Brilinta or any other), please ask your surgeon how many days before scheduled procedure should you stop taking them. You may also need to verify with prescribing physician if it is OK to stop your blood thinners.      INSTRUCTIONS IMPORTANT!!  Do not eat or drink anything after midnight.  ONLY if you are diabetic, check your sugar in the morning before your procedure.  Do not smoke, vape or drink alcoholic beverages 24 hours prior to your procedure.  Shower the night before AND the morning of your procedure with a Chlorhexidine wash such as hibiclens or Dial antibacterial soap from neck down. You may use your own shampoo and face wash. This helps your skin to be as bacteria free as possible.  If you wear contact lenses, dentures, hearing aids or glasses, bring a container to put them in and give to a family member.    Please leave all jewelry, piercings and valuables at home.  DO NOT remove hair from the surgery site.   If your condition  changes such as fever, cough, etc, please notify your surgeon.   ONLY if you have been diagnosed with sleep apnea please bring your C-PAP machine.  ONLY if you wear home oxygen please bring your portable oxygen tank the day of your procedure.   ONLY for patients requiring bowel prep, written instructions will be given by your doctor's office.  ONLY if you have a neuro stimulator, please bring the controller with you the morning of surgery  Make arrangements in advance for transportation home by a responsible adult.  You must make arrangements for transportation, TAXI'S, UBER'S OR LYFTS ARE NOT ALLOWED.        If you have any questions about these instructions, call Pre-Op Admit  Nursing at [phone] or the Pre-Op Day Surgery Unit at [phone].

## 2023-01-11 ENCOUNTER — OFFICE VISIT (OUTPATIENT)
Dept: SURGERY | Facility: CLINIC | Age: 73
End: 2023-01-11
Payer: MEDICARE

## 2023-01-11 VITALS — HEART RATE: 88 BPM | TEMPERATURE: 97 F | SYSTOLIC BLOOD PRESSURE: 122 MMHG | DIASTOLIC BLOOD PRESSURE: 77 MMHG

## 2023-01-11 DIAGNOSIS — C32.9 LARYNGEAL CANCER: ICD-10-CM

## 2023-01-11 PROCEDURE — 1126F PR PAIN SEVERITY QUANTIFIED, NO PAIN PRESENT: ICD-10-PCS | Mod: CPTII,S$GLB,, | Performed by: SURGERY

## 2023-01-11 PROCEDURE — 1126F AMNT PAIN NOTED NONE PRSNT: CPT | Mod: CPTII,S$GLB,, | Performed by: SURGERY

## 2023-01-11 PROCEDURE — 99213 PR OFFICE/OUTPT VISIT, EST, LEVL III, 20-29 MIN: ICD-10-PCS | Mod: S$GLB,,, | Performed by: SURGERY

## 2023-01-11 PROCEDURE — 1111F PR DISCHARGE MEDS RECONCILED W/ CURRENT OUTPATIENT MED LIST: ICD-10-PCS | Mod: CPTII,S$GLB,, | Performed by: SURGERY

## 2023-01-11 PROCEDURE — 1111F DSCHRG MED/CURRENT MED MERGE: CPT | Mod: CPTII,S$GLB,, | Performed by: SURGERY

## 2023-01-11 PROCEDURE — 1101F PT FALLS ASSESS-DOCD LE1/YR: CPT | Mod: CPTII,S$GLB,, | Performed by: SURGERY

## 2023-01-11 PROCEDURE — 3078F PR MOST RECENT DIASTOLIC BLOOD PRESSURE < 80 MM HG: ICD-10-PCS | Mod: CPTII,S$GLB,, | Performed by: SURGERY

## 2023-01-11 PROCEDURE — 3288F PR FALLS RISK ASSESSMENT DOCUMENTED: ICD-10-PCS | Mod: CPTII,S$GLB,, | Performed by: SURGERY

## 2023-01-11 PROCEDURE — 3074F PR MOST RECENT SYSTOLIC BLOOD PRESSURE < 130 MM HG: ICD-10-PCS | Mod: CPTII,S$GLB,, | Performed by: SURGERY

## 2023-01-11 PROCEDURE — 99213 OFFICE O/P EST LOW 20 MIN: CPT | Mod: S$GLB,,, | Performed by: SURGERY

## 2023-01-11 PROCEDURE — 3078F DIAST BP <80 MM HG: CPT | Mod: CPTII,S$GLB,, | Performed by: SURGERY

## 2023-01-11 PROCEDURE — 3288F FALL RISK ASSESSMENT DOCD: CPT | Mod: CPTII,S$GLB,, | Performed by: SURGERY

## 2023-01-11 PROCEDURE — 1101F PR PT FALLS ASSESS DOC 0-1 FALLS W/OUT INJ PAST YR: ICD-10-PCS | Mod: CPTII,S$GLB,, | Performed by: SURGERY

## 2023-01-11 PROCEDURE — 3074F SYST BP LT 130 MM HG: CPT | Mod: CPTII,S$GLB,, | Performed by: SURGERY

## 2023-01-11 NOTE — H&P
GENERAL SURGERY  OUTPATIENT H&P    REASON FOR VISIT/CC: Port placement    HPI: Cyrus Batres Jr. is a 72 y.o. male with history laryngeal cancer status total laryngectomy glossectomy referred for evaluation port placement. Patient is nonverbal and utilizes a writing board for communication. He previously left-sided port placed which was previously removed due to infection. He is familiar with port placement and the associated risks.  Denies fevers or chills.  Utilizes a feeding tube for nutritional intake.    I have reviewed the patient's chart including prior progress notes, procedures and testing.     ROS:   Review of Systems   All other systems reviewed and are negative.    PROBLEM LIST:  Patient Active Problem List   Diagnosis    Melanocytic nevus    Body mass index (BMI) of 29.0-29.9 in adult    Benign hypertension    Overweight    Paroxysmal atrial fibrillation    Type 2 diabetes mellitus with diabetic arthropathy, with long-term current use of insulin    Fatty liver disease, nonalcoholic    False positive serological test for hepatitis C    Hyperlipidemia    Type 2 diabetes mellitus with hyperglycemia, without long-term current use of insulin    Gastroesophageal reflux disease without esophagitis    Type 2 diabetes mellitus with hyperglycemia    Vitamin B12 deficiency    Hyperuricemia    Hypovitaminosis D    Proteinuria    On enteral nutrition    Larynx cancer    Dehydration    NSVT (nonsustained ventricular tachycardia)    Adverse effect of adrenal cortical steroids, sequela    S/P laryngectomy    Hypocalcemia    Aphonia    Dysphagia, pharyngoesophageal    Acute pain of both shoulders    Bilateral arm weakness    Oral bleeding    Malignant neoplasm of base of tongue    Advanced care planning/counseling discussion    Iron deficiency anemia due to chronic blood loss    Postoperative hypothyroidism    Pressure injury of sacral region, stage 1    Pneumatosis intestinalis    Nausea and vomiting    Neutropenia     Abnormal CT scan, neck    Open wound of neck    Open wnd of pharynx    Open wound of left thigh    Open wound of mouth    Tracheostomy in place    Malnutrition of mild degree    Type 2 diabetes mellitus, without long-term current use of insulin    Laryngeal cancer    Hypocalcemia    Hyperbilirubinemia    Elevated transaminase level    Hyponatremia    PEG (percutaneous endoscopic gastrostomy) adjustment/replacement/removal    Hypothyroidism    Dehydration    Pharyngocutaneous fistula    Protein malnutrition         HISTORY  Past Medical History:   Diagnosis Date    Abnormal CT scan, neck 09/22/2022    Allergy     pollen extracts    Atrial fibrillation     Chronic anticoagulation     Diabetes mellitus, type 2     GRIFFIN (dyspnea on exertion)     Encounter for blood transfusion     GERD (gastroesophageal reflux disease)     Gout, unspecified     Hypertension     Hypothyroidism 11/22/2022    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    PEG (percutaneous endoscopic gastrostomy) adjustment/replacement/removal 11/22/2022    Postoperative 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    Tongue cancer     Tracheostomy dependence     Type 2 diabetes mellitus, without long-term current use of insulin 11/22/2022       Past Surgical History:   Procedure Laterality Date    DIRECT LARYNGOBRONCHOSCOPY N/A 12/27/2021    Procedure: LARYNGOSCOPY, DIRECT, WITH BRONCHOSCOPY;  Surgeon: Flex Espinosa MD;  Location: [address];  Service: ENT;  Laterality: N/A;    DIRECT LARYNGOSCOPY  11/9/2022    Procedure: LARYNGOSCOPY, DIRECT;  Surgeon: Jesse James MD;  Location: [address];  Service: ENT;;    DISSECTION OF NECK Bilateral 1/6/2022    Procedure: DISSECTION, NECK;  Surgeon: Jesse James MD;  Location: [address];  Service: ENT;  Laterality: Bilateral;    DISSECTION OF NECK Bilateral 11/9/2022    Procedure: DISSECTION, NECK;  Surgeon: Jesse James MD;  Location: [address];  Service: ENT;  Laterality: Bilateral;    FLAP  PROCEDURE Right 1/6/2022    Procedure: CREATION, FREE FLAP;  Surgeon: Alise Hart MD;  Location: [facility] OR [facility] F[facility];  Service: ENT;  Laterality: Right;  Ischemic start 1351  Ischemic stop 1502    FLAP PROCEDURE Left 11/9/2022    Procedure: CREATION, FREE FLAP, ALT;  Surgeon: Alise Hart MD;  Location: [facility] OR [facility] FLR;  Service: ENT;  Laterality: Left;    GLOSSECTOMY Bilateral 11/9/2022    Procedure: TOTAL GLOSSECTOMY;  Surgeon: Jesse James MD;  [facility];  Service: ENT;  Laterality: Bilateral;    INSERTION OF TUNNELED CENTRAL VENOUS CATHETER (CVC) WITH SUBCUTANEOUS PORT N/A 6/9/2022    Procedure: ZVOUMOABE-ZKYO-C-CATH;  Surgeon: Jesus Viera MD;  Location: [facility] OR;  Service: General;  Laterality: N/A;    LARYNGECTOMY N/A 1/6/2022    Procedure: LARYNGECTOMY;  Surgeon: Jesse James MD;  [facility];  Service: ENT;  Laterality: N/A;    LARYNGOSCOPY N/A 8/4/2020    Procedure: Suspension microlaryngoscopy with biopsy, possible KTP laser treatment/excision;  Surgeon: Stew Noel MD;  Location: [facility] OR [address] [place] [address];  Service: ENT;  Laterality: N/A;  Microscope, telescopes, tower, microinstruments, KTP laser, rep conf# [id number] IC 7/28.    LARYNGOSCOPY N/A 3/16/2021    Procedure: Suspension microlaryngoscopy with excision of lesion, possible CO2 laser;  Surgeon: Stew Noel MD;  [facility];  Service: ENT;  Laterality: N/A;  Microscope, telescopes, tower, microinstruments, CO2 laser, rep conf# [id number] IC 3/4.    LARYNGOSCOPY N/A 4/1/2021    Procedure: Suspension microlaryngoscopy with KTP laser excision of lesion;  Surgeon: Stew Noel MD;  [facility];  Service: ENT;  Laterality: N/A;  Microscope, telescopes, tower, microinstruments, 70 degree scope, vocal fold , KTP laser, rep conf# [id number] BC    LARYNGOSCOPY N/A 12/9/2021    Procedure: Suspension microlaryngoscopy with biopsy;  Surgeon: Stew Noel MD;   Location: [facility] OR 2ND FLR;  Service: ENT;  Laterality: N/A;  Microscope, telescopes, tower, microinstruments    LARYNGOSCOPY N/A 1/6/2022    Procedure: LARYNGOSCOPY;  Surgeon: Jesse James MD;  Location: [facility] OR 2ND FLR;  Service: ENT;  Laterality: N/A;    LARYNGOSCOPY N/A 4/27/2022    Procedure: LARYNGOSCOPY WITH BIOPSY;  Surgeon: Jesse James MD;  Location: [facility] OR [facility] FLR;  Service: ENT;  Laterality: N/A;    MICROLARYNGOSCOPY N/A 3/17/2020    Procedure: MICROLARYNGOSCOPY;  Surgeon: Jung Xiao MD;  Location: [facility] OR;  Service: ENT;  Laterality: N/A;  Laser Microlaryngoscopy  NEED TO SCHEDULE LASER from [facility]"[employer]" [phone]    PHARYNGECTOMY  11/9/2022    Procedure: TOTAL PHARYNGECTOMY;  Surgeon: Jesse James MD;  [facility];  Service: ENT;;    REIMPLANTATION OF PARATHYROID TISSUE N/A 1/6/2022    Procedure: REIMPLANTATION, PARATHYROID TISSUE;  Surgeon: Jesse James MD;  Location: [facility] OR [facility] FLR;  Service: ENT;  Laterality: N/A;    SKIN SPLIT GRAFT Right 11/9/2022    Procedure: APPLICATION, GRAFT, SKIN, SPLIT-THICKNESS;  Surgeon: Jesse James MD;  [facility];  Service: ENT;  Laterality: Right;    THYROIDECTOMY  1/6/2022    Procedure: THYROIDECTOMY;  Surgeon: Jesse James MD;  [facility];  Service: ENT;;    TRACHEOSTOMY N/A 12/27/2021    Procedure: CREATION, TRACHEOSTOMY;  Surgeon: Flex Espinosa MD;  Location: [facility] OR [facility];  Service: ENT;  Laterality: N/A;       Social History     Tobacco Use    Smoking status: Never    Smokeless tobacco: Never   Substance Use Topics    Alcohol use: Not Currently     Comment: occasional    Drug use: No       Family History   Problem Relation Age of Onset    Abnormal EKG Mother     Diabetes Father     Heart disease Father     Hypertension Father          MEDS:  Current Outpatient Medications on File Prior to Visit   Medication Sig Dispense Refill    acetaminophen (TYLENOL) 325  "MG tablet Take 325 mg by mouth every 6 (six) hours as needed for Pain.      BD ULTRA-FINE YULISSA PEN NEEDLE 32 gauge x 5/32" Ndle To be used with Novolog pen up to 4x a day 100 each 3    calcitrioL (ROCALTROL) 1 mcg/mL solution Take 0.25 mLs (0.25 mcg total) by Per G Tube route once daily. 15 mL 12    calcitrioL (ROCALTROL) 1 mcg/mL solution 0.25 mLs (0.25 mcg total) by Per G Tube route once daily. 15 mL 3    calcium carbonate (TUMS) 200 mg calcium (500 mg) chewable tablet 2 tablets (1,000 mg total) by Per G Tube route 5 (five) times daily. 300 tablet 11    calcium carbonate 500 mg/5 mL (1,250 mg/5 mL) 10 mLs (1,000 mg total) by Per G Tube route 3 (three) times daily with meals. 473 mL 12    esomeprazole (NEXIUM) 40 MG capsule 40 mg before breakfast.      famotidine (PEPCID) 40 mg/5 mL (8 mg/mL) suspension 2.5 mLs (20 mg total) by Per G Tube route 2 (two) times daily. 50 mL 11    ibuprofen (ADVIL,MOTRIN) 200 MG tablet Take 200 mg by mouth every 6 (six) hours as needed for Pain.      insulin aspart U-100 (NOVOLOG FLEXPEN U-100 INSULIN) 100 unit/mL (3 mL) InPn pen Inject 0-10 Units into the skin as needed; Add correction scale if needed while on TF.  Blood sugar 180-230 add 2 units, 231-280 +4 units, 281-330 +6 units, 331-380 +8 units, >380 +10 units.  MDD 40 units 12 mL 0    levothyroxine (SYNTHROID) 100 MCG tablet 1 tablet (100 mcg total) by Per G Tube route before breakfast. 30 tablet 11    losartan (COZAAR) 100 MG tablet Take 0.5 tablets (50 mg total) by mouth once daily. (Patient taking differently: Take 50 mg by mouth every evening.) 45 tablet 3    metFORMIN (GLUCOPHAGE) 500 MG tablet Take 1 tablet (500 mg total) by mouth 2 (two) times daily with meals. 60 tablet 3    traZODone (DESYREL) 50 MG tablet TAKE 1 TABLET BY MOUTH NIGHTLY AS NEEDED FOR INSOMNIA. 90 tablet 1    zolpidem (AMBIEN) 5 MG Tab 1 tablet (5 mg total) by Per G Tube route nightly as needed (difficult with sleep). 30 tablet 0    [DISCONTINUED] " aspirin (ECOTRIN) 81 MG EC tablet Take 81 mg by mouth Daily.       No current facility-administered medications on file prior to visit.       ALLERGIES:  Review of patient's allergies indicates:   Allergen Reactions    Lovastatin Itching    Pollen extracts     Lovastatin Rash     Not confirmed but pt skeptical         VITALS:  Vitals:    01/11/23 1506   BP: 122/77   Pulse: 88   Temp: 97 °F (36.1 °C)         PHYSICAL EXAM:  Physical Exam  Vitals reviewed.   Constitutional:       Appearance: He is not toxic-appearing.   HENT:      Mouth/Throat:      Comments: Tracheotomy in place  Eyes:      General: No scleral icterus.  Neck:      Comments: Well-healed previous left-sided port incision  Cardiovascular:      Rate and Rhythm: Normal rate and regular rhythm.   Pulmonary:      Effort: Pulmonary effort is normal. No respiratory distress.      Breath sounds: Normal breath sounds.   Abdominal:      General: There is no distension.      Palpations: Abdomen is soft.      Tenderness: There is no abdominal tenderness.   Musculoskeletal:         General: No tenderness. Normal range of motion.      Cervical back: Neck supple. No rigidity.   Skin:     General: Skin is warm.      Coloration: Skin is not jaundiced.      Findings: No erythema.   Neurological:      General: No focal deficit present.      Mental Status: He is alert and oriented to person, place, and time.   Psychiatric:         Mood and Affect: Mood normal.         Behavior: Behavior normal.         Thought Content: Thought content normal.         LABS:  Lab Results   Component Value Date    WBC 4.63 01/09/2023    RBC 3.37 (L) 01/09/2023    HGB 10.6 (L) 01/09/2023    HCT 32.3 (L) 01/09/2023    HCT 25 (L) 11/09/2022     01/09/2023     Lab Results   Component Value Date     01/09/2023     01/09/2023    K 4.1 01/09/2023     01/09/2023    CO2 26 01/09/2023    BUN 19 01/09/2023    CREATININE 0.8 01/09/2023    CALCIUM 8.6 (L) 01/09/2023     Lab  Results   Component Value Date    ALT 55 (H) 01/09/2023    AST 51 (H) 01/09/2023    GGT 1,160 (H) 11/25/2022    ALKPHOS 469 (H) 01/09/2023    BILITOT 0.9 01/09/2023     Lab Results   Component Value Date    MG 2.5 11/24/2022    PHOS 2.4 (L) 11/24/2022         ASSESSMENT & PLAN:  72 y.o. male with laryngeal cancer with need for venous access  - we discussed the indication for port placement to ensure secure and easy access to the venous system for chemotherapy infusion   - the procedure for placement as well as associated risk of pain, bleeding, scarring, infection, need to remove port, port malfunction, port malpositioning, injury to artery, injury to lung, arrhythmia, malfunction, hematoma, thrombus, etc.  were reviewed, patient expressed understanding these risks and agreed proceed with surgical intervention  -will plan for right versus left IJ port on 01/12/2023  -preop workup completed yesterday

## 2023-01-11 NOTE — H&P (VIEW-ONLY)
GENERAL SURGERY  OUTPATIENT H&P    REASON FOR VISIT/CC: Port placement    HPI: Cyrus Batres Jr. is a 72 y.o. male with history laryngeal cancer status total laryngectomy glossectomy referred for evaluation port placement. Patient is nonverbal and utilizes a writing board for communication. He previously left-sided port placed which was previously removed due to infection. He is familiar with port placement and the associated risks.  Denies fevers or chills.  Utilizes a feeding tube for nutritional intake.    I have reviewed the patient's chart including prior progress notes, procedures and testing.     ROS:   Review of Systems   All other systems reviewed and are negative.    PROBLEM LIST:  Patient Active Problem List   Diagnosis    Melanocytic nevus    Body mass index (BMI) of 29.0-29.9 in adult    Benign hypertension    Overweight    Paroxysmal atrial fibrillation    Type 2 diabetes mellitus with diabetic arthropathy, with long-term current use of insulin    Fatty liver disease, nonalcoholic    False positive serological test for hepatitis C    Hyperlipidemia    Type 2 diabetes mellitus with hyperglycemia, without long-term current use of insulin    Gastroesophageal reflux disease without esophagitis    Type 2 diabetes mellitus with hyperglycemia    Vitamin B12 deficiency    Hyperuricemia    Hypovitaminosis D    Proteinuria    On enteral nutrition    Larynx cancer    Dehydration    NSVT (nonsustained ventricular tachycardia)    Adverse effect of adrenal cortical steroids, sequela    S/P laryngectomy    Hypocalcemia    Aphonia    Dysphagia, pharyngoesophageal    Acute pain of both shoulders    Bilateral arm weakness    Oral bleeding    Malignant neoplasm of base of tongue    Advanced care planning/counseling discussion    Iron deficiency anemia due to chronic blood loss    Postoperative hypothyroidism    Pressure injury of sacral region, stage 1    Pneumatosis intestinalis    Nausea and vomiting    Neutropenia     Abnormal CT scan, neck    Open wound of neck    Open wnd of pharynx    Open wound of left thigh    Open wound of mouth    Tracheostomy in place    Malnutrition of mild degree    Type 2 diabetes mellitus, without long-term current use of insulin    Laryngeal cancer    Hypocalcemia    Hyperbilirubinemia    Elevated transaminase level    Hyponatremia    PEG (percutaneous endoscopic gastrostomy) adjustment/replacement/removal    Hypothyroidism    Dehydration    Pharyngocutaneous fistula    Protein malnutrition         HISTORY  Past Medical History:   Diagnosis Date    Abnormal CT scan, neck 09/22/2022    Allergy     pollen extracts    Atrial fibrillation     Chronic anticoagulation     Diabetes mellitus, type 2     GRIFFIN (dyspnea on exertion)     Encounter for blood transfusion     GERD (gastroesophageal reflux disease)     Gout, unspecified     Hypertension     Hypothyroidism 11/22/2022    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    PEG (percutaneous endoscopic gastrostomy) adjustment/replacement/removal 11/22/2022    Postoperative 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    Tongue cancer     Tracheostomy dependence     Type 2 diabetes mellitus, without long-term current use of insulin 11/22/2022       Past Surgical History:   Procedure Laterality Date    DIRECT LARYNGOBRONCHOSCOPY N/A 12/27/2021    Procedure: LARYNGOSCOPY, DIRECT, WITH BRONCHOSCOPY;  Surgeon: Flex Espinosa MD;  Location: [address];  Service: ENT;  Laterality: N/A;    DIRECT LARYNGOSCOPY  11/9/2022    Procedure: LARYNGOSCOPY, DIRECT;  Surgeon: Jesse James MD;  Location: [address];  Service: ENT;;    DISSECTION OF NECK Bilateral 1/6/2022    Procedure: DISSECTION, NECK;  Surgeon: Jesse James MD;  Location: [address];  Service: ENT;  Laterality: Bilateral;    DISSECTION OF NECK Bilateral 11/9/2022    Procedure: DISSECTION, NECK;  Surgeon: Jesse James MD;  Location: [address];  Service: ENT;  Laterality: Bilateral;    FLAP  PROCEDURE Right 1/6/2022    Procedure: CREATION, FREE FLAP;  Surgeon: Alise Hart MD;  Location: [facility] OR [facility];  Service: ENT;  Laterality: Right;  Ischemic start 1351  Ischemic stop 1502    FLAP PROCEDURE Left 11/9/2022    Procedure: CREATION, FREE FLAP, ALT;  Surgeon: Alise Hart MD;  Location: [facility] OR [facility];  Service: ENT;  Laterality: Left;    GLOSSECTOMY Bilateral 11/9/2022    Procedure: TOTAL GLOSSECTOMY;  Surgeon: Jesse James MD;  [facility];  Service: ENT;  Laterality: Bilateral;    INSERTION OF TUNNELED CENTRAL VENOUS CATHETER (CVC) WITH SUBCUTANEOUS PORT N/A 6/9/2022    Procedure: [id number];  Surgeon: Jesus Viera MD;  Location: [facility] OR;  Service: General;  Laterality: N/A;    LARYNGECTOMY N/A 1/6/2022    Procedure: LARYNGECTOMY;  Surgeon: Jesse James MD;  [facility];  Service: ENT;  Laterality: N/A;    LARYNGOSCOPY N/A 8/4/2020    Procedure: Suspension microlaryngoscopy with biopsy, possible KTP laser treatment/excision;  Surgeon: Stew Noel MD;  Location: [facility] OR [address] [place] [address];  Service: ENT;  Laterality: N/A;  Microscope, telescopes, tower, microinstruments, KTP laser, rep conf# [id number] IC 7/28.    LARYNGOSCOPY N/A 3/16/2021    Procedure: Suspension microlaryngoscopy with excision of lesion, possible CO2 laser;  Surgeon: Stew Noel MD;  [facility];  Service: ENT;  Laterality: N/A;  Microscope, telescopes, tower, microinstruments, CO2 laser, rep conf# [id number] IC 3/4.    LARYNGOSCOPY N/A 4/1/2021    Procedure: Suspension microlaryngoscopy with KTP laser excision of lesion;  Surgeon: Stew Noel MD;  [facility];  Service: ENT;  Laterality: N/A;  Microscope, telescopes, tower, microinstruments, 70 degree scope, vocal fold , KTP laser, rep conf# [id number] BC    LARYNGOSCOPY N/A 12/9/2021    Procedure: Suspension microlaryngoscopy with biopsy;  Surgeon: Stew Noel MD;   Location: [facility] OR 2ND FLR;  Service: ENT;  Laterality: N/A;  Microscope, telescopes, tower, microinstruments    LARYNGOSCOPY N/A 1/6/2022    Procedure: LARYNGOSCOPY;  Surgeon: Jesse James MD;  Location: [facility] OR 2ND FLR;  Service: ENT;  Laterality: N/A;    LARYNGOSCOPY N/A 4/27/2022    Procedure: LARYNGOSCOPY WITH BIOPSY;  Surgeon: Jesse James MD;  Location: [facility] OR [facility];  Service: ENT;  Laterality: N/A;    MICROLARYNGOSCOPY N/A 3/17/2020    Procedure: MICROLARYNGOSCOPY;  Surgeon: Jung Xiao MD;  Location: [facility]placed [facility];  Service: ENT;  Laterality: N/A;  Laser Microlaryngoscopy  [id number]    PHARYNGECTOMY  11/9/2022    Procedure: TOTAL PHARYNGECTOMY;  Surgeon: Jesse James MD;  [facility];  Service: ENT;;    REIMPLANTATION OF PARATHYROID TISSUE N/A 1/6/2022    Procedure: REIMPLANTATION, PARATHYROID TISSUE;  Surgeon: Jesse James MD;  Location: [facility] OR [facility]LR;  Service: ENT;  Laterality: N/A;    SKIN SPLIT GRAFT Right 11/9/2022    Procedure: APPLICATION, GRAFT, SKIN, SPLIT-THICKNESS;  Surgeon: Jesse James MD;  [facility];  Service: ENT;  Laterality: Right;    THYROIDECTOMY  1/6/2022    Procedure: THYROIDECTOMY;  Surgeon: Jesse James MD;  [facility];  Service: ENT;;    TRACHEOSTOMY N/A 12/27/2021    Procedure: CREATION, TRACHEOSTOMY;  Surgeon: Flex Espinosa MD;  Location: [facility] OR [facility];  Service: ENT;  Laterality: N/A;       Social History     Tobacco Use    Smoking status: Never    Smokeless tobacco: Never   Substance Use Topics    Alcohol use: Not Currently     Comment: occasional    Drug use: No       Family History   Problem Relation Age of Onset    Abnormal EKG Mother     Diabetes Father     Heart disease Father     Hypertension Father          MEDS:  Current Outpatient Medications on File Prior to Visit   Medication Sig Dispense Refill    acetaminophen (TYLENOL) 325  "MG tablet Take 325 mg by mouth every 6 (six) hours as needed for Pain.      BD ULTRA-FINE YULISSA PEN NEEDLE 32 gauge x 5/32" Ndle To be used with Novolog pen up to 4x a day 100 each 3    calcitrioL (ROCALTROL) 1 mcg/mL solution Take 0.25 mLs (0.25 mcg total) by Per G Tube route once daily. 15 mL 12    calcitrioL (ROCALTROL) 1 mcg/mL solution 0.25 mLs (0.25 mcg total) by Per G Tube route once daily. 15 mL 3    calcium carbonate (TUMS) 200 mg calcium (500 mg) chewable tablet 2 tablets (1,000 mg total) by Per G Tube route 5 (five) times daily. 300 tablet 11    calcium carbonate 500 mg/5 mL (1,250 mg/5 mL) 10 mLs (1,000 mg total) by Per G Tube route 3 (three) times daily with meals. 473 mL 12    esomeprazole (NEXIUM) 40 MG capsule 40 mg before breakfast.      famotidine (PEPCID) 40 mg/5 mL (8 mg/mL) suspension 2.5 mLs (20 mg total) by Per G Tube route 2 (two) times daily. 50 mL 11    ibuprofen (ADVIL,MOTRIN) 200 MG tablet Take 200 mg by mouth every 6 (six) hours as needed for Pain.      insulin aspart U-100 (NOVOLOG FLEXPEN U-100 INSULIN) 100 unit/mL (3 mL) InPn pen Inject 0-10 Units into the skin as needed; Add correction scale if needed while on TF.  Blood sugar 180-230 add 2 units, 231-280 +4 units, 281-330 +6 units, 331-380 +8 units, >380 +10 units.  MDD 40 units 12 mL 0    levothyroxine (SYNTHROID) 100 MCG tablet 1 tablet (100 mcg total) by Per G Tube route before breakfast. 30 tablet 11    losartan (COZAAR) 100 MG tablet Take 0.5 tablets (50 mg total) by mouth once daily. (Patient taking differently: Take 50 mg by mouth every evening.) 45 tablet 3    metFORMIN (GLUCOPHAGE) 500 MG tablet Take 1 tablet (500 mg total) by mouth 2 (two) times daily with meals. 60 tablet 3    traZODone (DESYREL) 50 MG tablet TAKE 1 TABLET BY MOUTH NIGHTLY AS NEEDED FOR INSOMNIA. 90 tablet 1    zolpidem (AMBIEN) 5 MG Tab 1 tablet (5 mg total) by Per G Tube route nightly as needed (difficult with sleep). 30 tablet 0    [DISCONTINUED] " aspirin (ECOTRIN) 81 MG EC tablet Take 81 mg by mouth Daily.       No current facility-administered medications on file prior to visit.       ALLERGIES:  Review of patient's allergies indicates:   Allergen Reactions    Lovastatin Itching    Pollen extracts     Lovastatin Rash     Not confirmed but pt skeptical         VITALS:  Vitals:    01/11/23 1506   BP: 122/77   Pulse: 88   Temp: 97 °F (36.1 °C)         PHYSICAL EXAM:  Physical Exam  Vitals reviewed.   Constitutional:       Appearance: He is not toxic-appearing.   HENT:      Mouth/Throat:      Comments: Tracheotomy in place  Eyes:      General: No scleral icterus.  Neck:      Comments: Well-healed previous left-sided port incision  Cardiovascular:      Rate and Rhythm: Normal rate and regular rhythm.   Pulmonary:      Effort: Pulmonary effort is normal. No respiratory distress.      Breath sounds: Normal breath sounds.   Abdominal:      General: There is no distension.      Palpations: Abdomen is soft.      Tenderness: There is no abdominal tenderness.   Musculoskeletal:         General: No tenderness. Normal range of motion.      Cervical back: Neck supple. No rigidity.   Skin:     General: Skin is warm.      Coloration: Skin is not jaundiced.      Findings: No erythema.   Neurological:      General: No focal deficit present.      Mental Status: He is alert and oriented to person, place, and time.   Psychiatric:         Mood and Affect: Mood normal.         Behavior: Behavior normal.         Thought Content: Thought content normal.         LABS:  Lab Results   Component Value Date    WBC 4.63 01/09/2023    RBC 3.37 (L) 01/09/2023    HGB 10.6 (L) 01/09/2023    HCT 32.3 (L) 01/09/2023    HCT 25 (L) 11/09/2022     01/09/2023     Lab Results   Component Value Date     01/09/2023     01/09/2023    K 4.1 01/09/2023     01/09/2023    CO2 26 01/09/2023    BUN 19 01/09/2023    CREATININE 0.8 01/09/2023    CALCIUM 8.6 (L) 01/09/2023     Lab  Results   Component Value Date    ALT 55 (H) 01/09/2023    AST 51 (H) 01/09/2023    GGT 1,160 (H) 11/25/2022    ALKPHOS 469 (H) 01/09/2023    BILITOT 0.9 01/09/2023     Lab Results   Component Value Date    MG 2.5 11/24/2022    PHOS 2.4 (L) 11/24/2022         ASSESSMENT & PLAN:  72 y.o. male with laryngeal cancer with need for venous access  - we discussed the indication for port placement to ensure secure and easy access to the venous system for chemotherapy infusion   - the procedure for placement as well as associated risk of pain, bleeding, scarring, infection, need to remove port, port malfunction, port malpositioning, injury to artery, injury to lung, arrhythmia, malfunction, hematoma, thrombus, etc.  were reviewed, patient expressed understanding these risks and agreed proceed with surgical intervention  -will plan for right versus left IJ port on 01/12/2023  -preop workup completed yesterday

## 2023-01-12 ENCOUNTER — HOSPITAL ENCOUNTER (OUTPATIENT)
Dept: RADIOLOGY | Facility: HOSPITAL | Age: 73
Discharge: HOME | End: 2023-01-12
Attending: SURGERY | Admitting: SURGERY
Payer: MEDICARE

## 2023-01-12 ENCOUNTER — HOSPITAL ENCOUNTER (OUTPATIENT)
Facility: HOSPITAL | Age: 73
Discharge: HOME | End: 2023-01-12
Attending: SURGERY | Admitting: SURGERY
Payer: MEDICARE

## 2023-01-12 ENCOUNTER — ANESTHESIA (OUTPATIENT)
Dept: SURGERY | Facility: HOSPITAL | Age: 73
End: 2023-01-12
Payer: MEDICARE

## 2023-01-12 ENCOUNTER — ANESTHESIA EVENT (OUTPATIENT)
Dept: SURGERY | Facility: HOSPITAL | Age: 73
End: 2023-01-12
Payer: MEDICARE

## 2023-01-12 VITALS
HEIGHT: 66 IN | HEART RATE: 81 BPM | TEMPERATURE: 98 F | WEIGHT: 127 LBS | SYSTOLIC BLOOD PRESSURE: 118 MMHG | OXYGEN SATURATION: 100 % | BODY MASS INDEX: 20.41 KG/M2 | RESPIRATION RATE: 16 BRPM | DIASTOLIC BLOOD PRESSURE: 72 MMHG

## 2023-01-12 DIAGNOSIS — C10.9 OROPHARYNGEAL CANCER: ICD-10-CM

## 2023-01-12 DIAGNOSIS — C32.9 LARYNGEAL CANCER: ICD-10-CM

## 2023-01-12 LAB — GLUCOSE SERPL-MCNC: 105 MG/DL (ref 70–110)

## 2023-01-12 PROCEDURE — 71000015 HC POSTOP RECOV 1ST HR: Performed by: SURGERY

## 2023-01-12 PROCEDURE — 77001 CHG FLUOROGUIDE CNTRL VEN ACCESS,PLACE,REPLACE,REMOVE: ICD-10-PCS | Mod: 26,,, | Performed by: SURGERY

## 2023-01-12 PROCEDURE — 63600175 PHARM REV CODE 636 W HCPCS: Performed by: SURGERY

## 2023-01-12 PROCEDURE — 77001 FLUOROGUIDE FOR VEIN DEVICE: CPT | Mod: TC

## 2023-01-12 PROCEDURE — 36561 INSERT TUNNELED CV CATH: CPT | Mod: RT,,, | Performed by: SURGERY

## 2023-01-12 PROCEDURE — 77001 FLUOROGUIDE FOR VEIN DEVICE: CPT | Mod: 26,,, | Performed by: SURGERY

## 2023-01-12 PROCEDURE — 63600175 PHARM REV CODE 636 W HCPCS: Performed by: NURSE ANESTHETIST, CERTIFIED REGISTERED

## 2023-01-12 PROCEDURE — C1788 PORT, INDWELLING, IMP: HCPCS | Performed by: SURGERY

## 2023-01-12 PROCEDURE — 37000009 HC ANESTHESIA EA ADD 15 MINS: Performed by: SURGERY

## 2023-01-12 PROCEDURE — 37000008 HC ANESTHESIA 1ST 15 MINUTES: Performed by: SURGERY

## 2023-01-12 PROCEDURE — 36000707: Performed by: SURGERY

## 2023-01-12 PROCEDURE — 27201423 OPTIME MED/SURG SUP & DEVICES STERILE SUPPLY: Performed by: SURGERY

## 2023-01-12 PROCEDURE — 36000706: Performed by: SURGERY

## 2023-01-12 PROCEDURE — 71000016 HC POSTOP RECOV ADDL HR: Performed by: SURGERY

## 2023-01-12 PROCEDURE — 25000003 PHARM REV CODE 250: Performed by: SURGERY

## 2023-01-12 PROCEDURE — 36561 PR INSERT TUNNELED CV CATH WITH PORT: ICD-10-PCS | Mod: RT,,, | Performed by: SURGERY

## 2023-01-12 DEVICE — PORT POWER MRI 8FR 1808000: Type: IMPLANTABLE DEVICE | Site: SUBCLAVIAN | Status: FUNCTIONAL

## 2023-01-12 RX ORDER — MIDAZOLAM HYDROCHLORIDE 1 MG/ML
INJECTION INTRAMUSCULAR; INTRAVENOUS
Status: DISCONTINUED | OUTPATIENT
Start: 2023-01-12 | End: 2023-01-12

## 2023-01-12 RX ORDER — HEPARIN SODIUM 1000 [USP'U]/ML
INJECTION, SOLUTION INTRAVENOUS; SUBCUTANEOUS
Status: DISCONTINUED | OUTPATIENT
Start: 2023-01-12 | End: 2023-01-12 | Stop reason: HOSPADM

## 2023-01-12 RX ORDER — FENTANYL CITRATE 50 UG/ML
INJECTION, SOLUTION INTRAMUSCULAR; INTRAVENOUS
Status: DISCONTINUED | OUTPATIENT
Start: 2023-01-12 | End: 2023-01-12

## 2023-01-12 RX ORDER — CEFAZOLIN SODIUM 2 G/50ML
2 SOLUTION INTRAVENOUS
Status: DISCONTINUED | OUTPATIENT
Start: 2023-01-12 | End: 2023-01-12 | Stop reason: HOSPADM

## 2023-01-12 RX ORDER — CEFAZOLIN SODIUM 1 G/3ML
INJECTION, POWDER, FOR SOLUTION INTRAMUSCULAR; INTRAVENOUS
Status: DISCONTINUED | OUTPATIENT
Start: 2023-01-12 | End: 2023-01-12

## 2023-01-12 RX ADMIN — SODIUM CHLORIDE, SODIUM LACTATE, POTASSIUM CHLORIDE, AND CALCIUM CHLORIDE: .6; .31; .03; .02 INJECTION, SOLUTION INTRAVENOUS at 10:01

## 2023-01-12 RX ADMIN — CEFAZOLIN 2 G: 330 INJECTION, POWDER, FOR SOLUTION INTRAMUSCULAR; INTRAVENOUS at 10:01

## 2023-01-12 RX ADMIN — FENTANYL CITRATE 50 MCG: 50 INJECTION INTRAMUSCULAR; INTRAVENOUS at 10:01

## 2023-01-12 RX ADMIN — MIDAZOLAM HYDROCHLORIDE 1 MG: 1 INJECTION, SOLUTION INTRAMUSCULAR; INTRAVENOUS at 11:01

## 2023-01-12 RX ADMIN — MIDAZOLAM HYDROCHLORIDE 2 MG: 1 INJECTION, SOLUTION INTRAMUSCULAR; INTRAVENOUS at 10:01

## 2023-01-12 NOTE — OP NOTE
DATE OF PROCEDURE: 01/12/2023    PREOPERATIVE DIAGNOSIS: Oropharyngeal cancer    POSTOPERATIVE DIAGNOSIS: Same    PROCEDURE: Right subclavian rita catheter    SURGEON: Abdulaziz Le M.D    ASSISTANT: None    ANESTHESIA: Local MAC    ESTIMATED BLOOD LOSS: Minimal    SPECIMEN: None    CONDITION: Stable    COMPLICATIONS: None    FINDINGS:   1. Right IJ not visualize therefore subclavian indicated  2. Return of dark red nonpulsatile blood on 3rd stick  3. Wire easily passed into the venous system and confirmed on x-ray  4. Port aspirated and flushed with ease    INDICATIONS: The patient is a 72-year-old male with oropharyngeal cancer with need for venous access for chemotherapy.    PROCEDURE IN DETAIL: Patient taken operating room placed in supine position where monitored anesthesia care was administered.  The patient had an existing tracheotomy but was not cuffed. He was lightly sedated and oxygen was kept on.  Due to the inability to have a contained system we elected not to use electrocautery. He received perioperative antibiotics.  Bilateral neck and chest were prepped and draped typical sterile fashion.  Time-out performed by members of the operative team.  I initially used ultrasound to see if I could identify the right internal jugular vein however it could not be identified.  It may have been taken during the previous neck dissection. I therefore elected perform a subclavian stick.  Local anesthetic was injected.  A small stab incision was made.  An 18 gauge needle was inserted under negative pressure utilizing landmarks.  Our 1st pass ran into the clavicle.  The needle was withdrawn and reinserted however did not have return of blood. Of note we did not have return of air either. On the 3rd attempt we had return of dark red nonpulsatile blood.  I was then able to pass a wire which under fluoroscopic guidance was in the venous system and below the diaphragm.  The needle was removed.  Our incision was  extended to accommodate the port.  A bridging vein was tied off with 3-0 silk sutures.  Again Bovie electrocautery was not used due to the open oxygen.  I was able to create a pocket bluntly. I then used a dilator and sheath and inserted over the wire under fluoroscopic guidance. It passed with relative ease.  The wire and dilator were then removed.  Catheter was then inserted and the sheath was peeled away.  The catheter tip was positioned near the atrial caval junction under fluoroscopic guidance.  It was then cut to size and attached to the port.  The port was inserted into the pocket which was snug. X-ray confirmed adequate placement of the catheter tip and course of the catheter without significant kinking.  The port was aspirated and flushed with ease. We then closed the incision with interrupted 3-0 Vicryl deep dermal sutures.  The skin was closed with running 4-0 Monocryl subcuticular stitch and Dermabond.  Patient was then aroused from sedation and taken back to the recovery room in stable condition having suffered no complications.  All counts were correct x2 the case.  I was present scrubbed throughout all operative portions of the case.    All fluoroscopic images were interpreted by me intraoperatively and stored for later viewing.    DISPO: Discharge home after x-ray

## 2023-01-12 NOTE — TRANSFER OF CARE
"Anesthesia Transfer of Care Note    Patient: Cyrus Batres Jr.    Procedure(s) Performed: Procedure(s) (LRB):  [id number] (Right)    Patient location: [facility]    Anesthesia Type: MAC    Transport from OR: Transported from OR on room air with adequate spontaneous ventilation    Post pain: adequate analgesia    Post assessment: no apparent anesthetic complications    Post vital signs: stable    Level of consciousness: awake    Nausea/Vomiting: no nausea/vomiting    Complications: none    Transfer of care protocol was followed      Last vitals:   Visit Vitals  /79   Pulse 79   Temp 36.7 °C (98.1 °F) (Oral)   Resp 16   Ht 5' 6" (1.676 m)   Wt 57.6 kg (127 lb)   SpO2 100%   BMI 20.50 kg/m²     "

## 2023-01-12 NOTE — PLAN OF CARE
1135- pt is alert and oriented breathing even unlabored with no complaints of pain. Right chest surgical site is clean and dry with dermabond in place. No redness or swelling noted. Lung sounds are present and clear bilaterally. Pt family at the bedside. 1215- pt tolerated po intake with no n/v. 1345- pt is alert and oriented breathing even unlabored with no complaints of pain. Surgical site is clean and dry with no redness or swelling noted. Lung sounds clean bilaterally. Chest x-ray negative for pneumothorax and port in place per radiology. Pt wheeled to his vehicle with no incident.

## 2023-01-12 NOTE — PROGRESS NOTES
[facility] HEMATOLGY ONCOLOGY     Subjective:       Patient ID: Cyrus Batres Jr. is a 72 y.o. male presents today for chemotherapy education.      Chief Complaint: Base of Tongue Cancer     HPI    He is due to start treatment with Cisplatin concurrently with radiation.    He had his PAC replaced yesterday and today he does have some redness on the PAC site.     He was on IV abx inpatient last month for a blood stream infection.     Oncology History   Larynx cancer   8/4/2020 Initial Diagnosis    Larynx neoplasm malignant     8/6/2020 Tumor Conference    His case was discussed at the Multidisciplinary Head and Neck Team Planning Meeting.    Representatives from Medical Oncology, Radiation Oncology, Head and Neck Surgical Oncology, Psychosocial Oncology, and Speech and Language Pathology discussed the case with the following recommendations:    1) definitive radiation              8/6/2020 Cancer Staged    Staging form: Larynx - Glottis, AJCC 8th Edition  - Clinical stage from 8/6/2020: Stage II (cT2, cN0, cM0)       8/6/2020 Cancer Staged    Cancer Staging  Larynx neoplasm malignant  Staging form: Larynx - Glottis, AJCC 8th Edition  - Clinical stage from 8/6/2020: Stage II (cT2, cN0, cM0) - Signed by Fariha Muñoz NP on 8/6/2020 12/16/2021 Tumor Conference    His case was discussed at the Multidisciplinary Head and Neck Team Planning Meeting.    Representatives from Medical Oncology, Radiation Oncology, Head and Neck Surgical Oncology, Psychosocial Oncology, and Speech and Language Pathology discussed the case with the following recommendations:    1) TL  2) oncology referral  3) psychology referral            12/27/2021 Surgery    1. DL And tracheostomy  2. PEG     1/6/2022 Surgery    1. Diagnostic direct laryngoscopy  2. Bilateral modified neck dissection of levels 2 through 4  3. Total laryngectomy  4. Total thyroidectomy with bilateral paratracheal/upper mediastinal neck dissection  5. Autotransplantation  of left inferior parathyroid into left sternocleidomastoid muscle   6. Left anterolateral thigh free flap for closure of total laryngectomy neck skin defect  7. Advancement flap for closure of left thigh donor site advanced area was 25 x 10 cm     1/20/2022 Cancer Staged    Staging form: Larynx - Glottis, AJCC 8th Edition  - Pathologic stage from 1/20/2022: Stage LOU (pT4a, pN0, cM0)       5/30/2022 Cancer Staged    Staging form: Pharynx - P16 Negative Oropharynx, AJCC 8th Edition  - Clinical: Stage II (rcT2, cN0, cM0)       1/16/2023 -  Chemotherapy    Treatment Summary   Plan Name: OP HEAD NECK CISPLATIN WEEKLY + RADIOTHERAPY  Treatment Goal: Curative  Status: Active  Start Date: 1/16/2023 (Planned)  End Date: 2/20/2023 (Planned)  Provider: Venu Tamez MD  Chemotherapy: CISplatin (Platinol) 40 mg/m2 = 66 mg in sodium chloride 0.9% 566 mL chemo infusion, 40 mg/m2, Intravenous, Clinic/HOD 1 time, 0 of 6 cycles       Malignant neoplasm of base of tongue   5/20/2022 Cancer Staged    Staging form: Pharynx - P16 Negative Oropharynx, AJCC 8th Edition  - Clinical stage from 5/20/2022: Stage II (cT2, cN0, cM0, p16-)       6/22/2022 Initial Diagnosis    Primary cancer of base of tongue     7/11/2022 - 8/26/2022 Chemotherapy    Treatment Summary   Plan Name: OP HEAD NECK PEMBROLIZUMAB CARBOPLATIN FLUOROURACIL Q3W FOLLOWED BY MAINTENANCE PEMBROLIZUMAB 400 MG Q6W  Treatment Goal: Palliative  Status: Inactive  Start Date: 7/11/2022  End Date: 8/26/2022  Provider: Aurash Khoobehi, MD  Chemotherapy: CARBOplatin (PARAPLATIN) 440 mg in sodium chloride 0.9% 544 mL chemo infusion, 440 mg (100 % of original dose 438.5 mg), Intravenous, Clinic/HOD 1 time, 3 of 6 cycles  Dose modification:   (original dose 438.5 mg, Cycle 1)  Administration: 440 mg (7/11/2022), 435 mg (8/1/2022), 510 mg (8/22/2022)       1/16/2023 -  Chemotherapy    Treatment Summary   Plan Name: OP HEAD NECK CISPLATIN WEEKLY + RADIOTHERAPY  Treatment Goal:  Curative  Status: Active  Start Date: 1/16/2023 (Planned)  End Date: 2/20/2023 (Planned)  Provider: Venu Tamez MD  Chemotherapy: CISplatin (Platinol) 40 mg/m2 = 66 mg in sodium chloride 0.9% 566 mL chemo infusion, 40 mg/m2, Intravenous, Clinic/HOD 1 time, 0 of 6 cycles           Past Medical History:   Diagnosis Date    Abnormal CT scan, neck 09/22/2022    Allergy     pollen extracts    Atrial fibrillation     Chronic anticoagulation     Diabetes mellitus, type 2     GRIFFIN (dyspnea on exertion)     Encounter for blood transfusion     GERD (gastroesophageal reflux disease)     Gout, unspecified     Hypertension     Hypothyroidism 11/22/2022    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    PEG (percutaneous endoscopic gastrostomy) adjustment/replacement/removal 11/22/2022    Postoperative 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    Tongue cancer     Tracheostomy dependence     Type 2 diabetes mellitus, without long-term current use of insulin 11/22/2022       Past Surgical History:   Procedure Laterality Date    DIRECT LARYNGOBRONCHOSCOPY N/A 12/27/2021    Procedure: LARYNGOSCOPY, DIRECT, WITH BRONCHOSCOPY;  Surgeon: Flex Espinosa MD;  Location: [facility] OR [address] [place] [address];  Service: ENT;  Laterality: N/A;    DIRECT LARYNGOSCOPY  11/9/2022    Procedure: LARYNGOSCOPY, DIRECT;  Surgeon: Jesse James MD;  Location: [address];  Service: ENT;;    DISSECTION OF NECK Bilateral 1/6/2022    Procedure: DISSECTION, NECK;  Surgeon: Jesse James MD;  Location: [address];  Service: ENT;  Laterality: Bilateral;    DISSECTION OF NECK Bilateral 11/9/2022    Procedure: DISSECTION, NECK;  Surgeon: Jesse James MD;  Location: [address];  Service: ENT;  Laterality: Bilateral;    FLAP PROCEDURE Right 1/6/2022    Procedure: CREATION, FREE FLAP;  Surgeon: Alise Hart MD;  Location: [facility] OR [address] [place] [address];  Service: ENT;  Laterality: Right;  Ischemic start 1351  Ischemic stop 1502    FLAP PROCEDURE Left 11/9/2022    Procedure:  CREATION, FREE FLAP, ALT;  Surgeon: Alise Hart MD;  Location: [facility] OR 2ND FLR;  Service: ENT;  Laterality: Left;    GLOSSECTOMY Bilateral 11/9/2022    Procedure: TOTAL GLOSSECTOMY;  Surgeon: Jesse James MD;  Location: [facility] OR [facility] FLR;  Service: ENT;  Laterality: Bilateral;    INSERTION OF TUNNELED CENTRAL VENOUS CATHETER (CVC) WITH SUBCUTANEOUS PORT N/A 6/9/2022    Procedure: [id number];  Surgeon: Jesus Viera MD;  Location: [facility] OR;  Service: General;  Laterality: N/A;    INSERTION OF TUNNELED CENTRAL VENOUS CATHETER (CVC) WITH SUBCUTANEOUS PORT Right 1/12/2023    Procedure: [id number];  Surgeon: Abdulaziz Le Jr., MD;  Location: [facility] OR;  Service: General;  Laterality: Right;    LARYNGECTOMY N/A 1/6/2022    Procedure: LARYNGECTOMY;  Surgeon: Jesse James MD;  [facility];  Service: ENT;  Laterality: N/A;    LARYNGOSCOPY N/A 8/4/2020    Procedure: Suspension microlaryngoscopy with biopsy, possible KTP laser treatment/excision;  Surgeon: Stew Noel MD;  [facility];  Service: ENT;  Laterality: N/A;  Microscope, telescopes, tower, microinstruments, KTP laser, rep conf# [id number] IC 7/28.    LARYNGOSCOPY N/A 3/16/2021    Procedure: Suspension microlaryngoscopy with excision of lesion, possible CO2 laser;  Surgeon: Stew Noel MD;  [facility];  Service: ENT;  Laterality: N/A;  Microscope, telescopes, tower, microinstruments, CO2 laser, rep conf# [id number] IC 3/4.    LARYNGOSCOPY N/A 4/1/2021    Procedure: Suspension microlaryngoscopy with KTP laser excision of lesion;  Surgeon: Stew Noel MD;  [facility];  Service: ENT;  Laterality: N/A;  Microscope, telescopes, tower, microinstruments, 70 degree scope, vocal fold , KTP laser, rep conf# [id number] BC    LARYNGOSCOPY N/A 12/9/2021    Procedure: Suspension microlaryngoscopy with biopsy;  Surgeon: Stew Noel MD;  Location:  [facility] OR 2ND FLR;  Service: ENT;  Laterality: N/A;  Microscope, telescopes, tower, microinstruments    LARYNGOSCOPY N/A 1/6/2022    Procedure: LARYNGOSCOPY;  Surgeon: Jesse James MD;  Location: [facility] OR 2ND FLR;  Service: ENT;  Laterality: N/A;    LARYNGOSCOPY N/A 4/27/2022    Procedure: LARYNGOSCOPY WITH BIOPSY;  Surgeon: Jesse James MD;  [facility]: [facility] OR 2ND FLR;  Service: ENT;  Laterality: N/A;    MICROLARYNGOSCOPY N/A 3/17/2020    Procedure: MICROLARYNGOSCOPY;  Surgeon: Jung Xiao MD;  Location: [facility] OR;  Service: ENT;  Laterality: N/A;  Laser Microlaryngoscopy  NEED TO SCHEDULE LASER from [facility] [id number]    PHARYNGECTOMY  11/9/2022    Procedure: TOTAL PHARYNGECTOMY;  Surgeon: Jesse James MD;  [facility]: [facility] OR 2ND FLR;  Service: ENT;;    REIMPLANTATION OF PARATHYROID TISSUE N/A 1/6/2022    Procedure: REIMPLANTATION, PARATHYROID TISSUE;  Surgeon: Jesse James MD;  Location: [facility] OR 2ND FLR;  Service: ENT;  Laterality: N/A;    SKIN SPLIT GRAFT Right 11/9/2022    Procedure: APPLICATION, GRAFT, SKIN, SPLIT-THICKNESS;  Surgeon: Jesse James MD;  Location: [facility] OR 2ND FLR;  Service: ENT;  Laterality: Right;    THYROIDECTOMY  1/6/2022    Procedure: THYROIDECTOMY;  Surgeon: Jesse James MD;  Location: [facility] OR [facility] FLR;  Service: ENT;;    TRACHEOSTOMY N/A 12/27/2021    Procedure: CREATION, TRACHEOSTOMY;  Surgeon: lFex Espinosa MD;  Location: [facility] OR 2ND FLR;  Service: ENT;  Laterality: N/A;       Social History     Socioeconomic History    Marital status:      Spouse name: Janel Batres    Number of children: 2   Occupational History    Occupation: [employer]     Employer: [employer]   Tobacco Use    Smoking status: Never    Smokeless tobacco: Never   Substance and Sexual Activity    Alcohol use: Not Currently     Comment: occasional    Drug use: No    Sexual activity: Yes     Partners: Female   Social History Narrative    ** Merged History  "Encounter **         2 children from his 1st wife     Social Determinants of Health     Financial Resource Strain: Low Risk     Difficulty of Paying Living Expenses: Not very hard   Food Insecurity: Unknown    Worried About Running Out of Food in the Last Year: Never true   Transportation Needs: No Transportation Needs    Lack of Transportation (Medical): No    Lack of Transportation (Non-Medical): No   Physical Activity: Unknown    Days of Exercise per Week: Patient refused    Minutes of Exercise per Session: Patient refused   Stress: Stress Concern Present    Feeling of Stress : To some extent   Social Connections: Unknown    Frequency of Communication with Friends and Family: Patient refused    Frequency of Social Gatherings with Friends and Family: Patient refused    Attends Hoahaoism Services: Patient refused    Active Member of Clubs or Organizations: Patient refused    Attends Club or Organization Meetings: Patient refused    Marital Status:    Housing Stability: Low Risk     Unable to Pay for Housing in the Last Year: No    Number of Places Lived in the Last Year: 1    Unstable Housing in the Last Year: No       Family History   Problem Relation Age of Onset    Abnormal EKG Mother     Diabetes Father     Heart disease Father     Hypertension Father        Review of patient's allergies indicates:   Allergen Reactions    Lovastatin Itching    Pollen extracts     Lovastatin Rash     Not confirmed but pt skeptical         Current Outpatient Medications:     acetaminophen (TYLENOL) 325 MG tablet, Take 325 mg by mouth every 6 (six) hours as needed for Pain., Disp: , Rfl:     BD ULTRA-FINE YULISSA PEN NEEDLE 32 gauge x 5/32" Ndle, To be used with Novolog pen up to 4x a day, Disp: 100 each, Rfl: 3    calcitrioL (ROCALTROL) 1 mcg/mL solution, Take 0.25 mLs (0.25 mcg total) by Per G Tube route once daily., Disp: 15 mL, Rfl: 12    calcitrioL (ROCALTROL) 1 mcg/mL solution, 0.25 mLs (0.25 mcg total) by Per G Tube " route once daily., Disp: 15 mL, Rfl: 3    calcium carbonate (TUMS) 200 mg calcium (500 mg) chewable tablet, 2 tablets (1,000 mg total) by Per G Tube route 5 (five) times daily., Disp: 300 tablet, Rfl: 11    calcium carbonate 500 mg/5 mL (1,250 mg/5 mL), 10 mLs (1,000 mg total) by Per G Tube route 3 (three) times daily with meals., Disp: 473 mL, Rfl: 12    esomeprazole (NEXIUM) 40 MG capsule, 40 mg before breakfast., Disp: , Rfl:     famotidine (PEPCID) 40 mg/5 mL (8 mg/mL) suspension, 2.5 mLs (20 mg total) by Per G Tube route 2 (two) times daily., Disp: 50 mL, Rfl: 11    ibuprofen (ADVIL,MOTRIN) 200 MG tablet, Take 200 mg by mouth every 6 (six) hours as needed for Pain., Disp: , Rfl:     levothyroxine (SYNTHROID) 100 MCG tablet, 1 tablet (100 mcg total) by Per G Tube route before breakfast., Disp: 30 tablet, Rfl: 11    losartan (COZAAR) 100 MG tablet, Take 0.5 tablets (50 mg total) by mouth once daily. (Patient taking differently: Take 50 mg by mouth every evening.), Disp: 45 tablet, Rfl: 3    metFORMIN (GLUCOPHAGE) 500 MG tablet, Take 1 tablet (500 mg total) by mouth 2 (two) times daily with meals., Disp: 60 tablet, Rfl: 3    traZODone (DESYREL) 50 MG tablet, TAKE 1 TABLET BY MOUTH NIGHTLY AS NEEDED FOR INSOMNIA., Disp: 90 tablet, Rfl: 1    zolpidem (AMBIEN) 5 MG Tab, 1 tablet (5 mg total) by Per G Tube route nightly as needed (difficult with sleep)., Disp: 30 tablet, Rfl: 0    doxycycline (VIBRA-TABS) 100 MG tablet, Take 1 tablet (100 mg total) by mouth 2 (two) times daily. for 14 days, Disp: 28 tablet, Rfl: 0    insulin aspart U-100 (NOVOLOG FLEXPEN U-100 INSULIN) 100 unit/mL (3 mL) InPn pen, Inject 0-10 Units into the skin as needed; Add correction scale if needed while on TF.  Blood sugar 180-230 add 2 units, 231-280 +4 units, 281-330 +6 units, 331-380 +8 units, >380 +10 units.  MDD 40 units, Disp: 12 mL, Rfl: 0    levoFLOXacin (LEVAQUIN) 250 mg/10 mL Soln, Take 30 mLs (750 mg total) by mouth once daily., Disp:  "480 mL, Rfl: 0    ondansetron (ZOFRAN) 8 MG tablet, Take 1 tablet (8 mg total) by mouth every 8 (eight) hours as needed for Nausea., Disp: 30 tablet, Rfl: 2    promethazine (PHENERGAN) 25 MG tablet, Take 1 tablet (25 mg total) by mouth every 4 to 6 hours as needed for Nausea., Disp: 30 tablet, Rfl: 2  No current facility-administered medications for this visit.    All medications and past history have been reviewed.    Review of Systems   Constitutional:  Negative for appetite change and unexpected weight change.   HENT:  Negative for mouth sores.    Eyes:  Negative for visual disturbance.   Respiratory:  Positive for shortness of breath. Negative for cough.    Cardiovascular:  Negative for chest pain.   Gastrointestinal:  Negative for abdominal pain and diarrhea.   Genitourinary:  Negative for frequency.   Musculoskeletal:  Negative for back pain.   Skin:  Positive for color change. Negative for rash.   Neurological:  Negative for headaches.   Hematological:  Negative for adenopathy.   Psychiatric/Behavioral:  The patient is not nervous/anxious.      Objective:        Physcial Examination  VITAL SIGNS:    Body surface area is 1.66 meters squared.   Pain Assessment  Vitals:    01/13/23 1057   BP: 124/67   Pulse: 88   Resp: 18   Temp: 97 °F (36.1 °C)   Weight: 59.4 kg (130 lb 14.4 oz)   Height: 5' 6" (1.676 m)   PainSc:   4   PainLoc: Shoulder          Wt Readings from Last 5 Encounters:   01/13/23 59.4 kg (130 lb 14.4 oz)   01/12/23 57.6 kg (127 lb)   01/09/23 57.7 kg (127 lb 3.3 oz)   01/03/23 58.5 kg (128 lb 15.5 oz)   01/03/23 57.7 kg (127 lb 4.8 oz)       Physical Examination:   GEN: no apparent distress, comfortable; AAOx3  HEAD: atraumatic and normocephalic  EYES: no pallor, no icterus, PERRLA  ENT: OMM, no pharyngeal erythema, external ears WNL; no nasal discharge; no thrush; s/p laryngectomy with flap; trach   NECK: no masses, thyroid normal, trachea midline, no LAD/LN's, supple; post-op dissection healing " well; staples in place  CV: RRR with no murmur; normal pulse; normal S1 and S2; no pedal edema; portacath right post op day 1 with redness   CHEST: Normal respiratory effort; CTAB; normal breath sounds; no wheeze or crackles  ABDOM: nontender and nondistended; soft; normal bowel sounds; no rebound/guarding; peg tube  MUSC/Skeletal: ROM normal; no crepitus; joints normal; no deformities or arthropathy  EXTREM: no clubbing, cyanosis, inflammation or swelling  SKIN: no rashes, lesions, ulcers, petechiae or subcutaneous nodules      : no mahan  NEURO: grossly intact; motor/sensory WNL; AAOx3; no tremors  PSYCH: normal mood, affect and behavior  LYMPH: normal cervical, supraclavicular, axillary and groin LN's      Laboratory and Radiology   Lab Results   Component Value Date    WBC 4.63 01/09/2023    RBC 3.37 (L) 01/09/2023    HGB 10.6 (L) 01/09/2023    HCT 32.3 (L) 01/09/2023    MCV 96 01/09/2023    MCH 31.5 (H) 01/09/2023    MCHC 32.8 01/09/2023    RDW 15.3 (H) 01/09/2023     01/09/2023    MPV 10.3 01/09/2023    GRAN 3.6 01/09/2023    GRAN 76.8 (H) 01/09/2023    LYMPH 0.6 (L) 01/09/2023    LYMPH 12.7 (L) 01/09/2023    MONO 0.3 01/09/2023    MONO 7.1 01/09/2023    EOS 0.1 01/09/2023    BASO 0.02 01/09/2023    EOSINOPHIL 2.6 01/09/2023    BASOPHIL 0.4 01/09/2023     BMP  Lab Results   Component Value Date     01/09/2023    K 4.1 01/09/2023     01/09/2023    CO2 26 01/09/2023    BUN 19 01/09/2023    CREATININE 0.8 01/09/2023    CALCIUM 8.6 (L) 01/09/2023    ANIONGAP 9 01/09/2023    ESTGFRAFRICA >60.0 07/29/2022    EGFRNONAA >60.0 07/29/2022     Lab Results   Component Value Date    ALT 55 (H) 01/09/2023    AST 51 (H) 01/09/2023    GGT 1,160 (H) 11/25/2022    ALKPHOS 469 (H) 01/09/2023    BILITOT 0.9 01/09/2023     Results for orders placed or performed during the hospital encounter of 11/23/22 (from the past 2160 hour(s))   CT Soft Tissue Neck With Contrast    Impression    1. Postsurgical change as  described above.  Persistent inflammatory stranding and multifocal fluid collection as above.  Nonspecific in the early postoperative setting, but infection/abscess can not be excluded.  Decreased size of irregular complex fluid collection superior to the tracheostomy tube, but the remainder of the fluid collections are similar in size.  2. Interval removal of left chest wall port, now with small air-fluid collection measuring up to 2.5 cm at that level.  3. Additional stable findings as above.  This report was flagged in Epic as abnormal.    Electronically signed by resident: Redd King  Date:    11/30/2022  Time:    07:54    Electronically signed by: Javon Bazan MD  Date:    11/30/2022  Time:    15:52   Results for orders placed or performed during the hospital encounter of 11/09/22 (from the past 2160 hour(s))   CT Chest With Contrast    Impression    1.  There is no evidence of metastatic disease in the thorax.    2.  Stable areas of postradiation fibrosis and traction bronchiectasis in the upper lung zones.    3.  Postoperative change of laryngectomy.    4.  Severe calcific atherosclerosis of the coronary arteries.    Electronically signed by resident: Trudy Malik  Date:    11/09/2022  Time:    09:14    Electronically signed by: Too Paulson MD  Date:    11/09/2022  Time:    12:56     *Note: Due to a large number of results and/or encounters for the requested time period, some results have not been displayed. A complete set of results can be found in Results Review.     No results found. However, due to the size of the patient record, not all encounters were searched. Please check Results Review for a complete set of results.  No results found. However, due to the size of the patient record, not all encounters were searched. Please check Results Review for a complete set of results.    Pathology  Pathology Results  (Last 10 years)                 11/28/22 8759  Specimen to Pathology, Surgery Gross only  Final result    Narrative:  Pre-op Diagnosis: fungemia   Number of specimens: 1   Name of specimens: port   Which provider would you like to cc?->TAMMI WHELAN   Release to patient->Immediate   Specimen total (fresh, frozen, permanent):->1       11/09/22 1420  Specimen to Pathology, Surgery ENT Final result    Narrative:  Pre-op Diagnosis: Malignant neoplasm of base of tongue [C01]   Procedure(s):   PHARYNGECTOMY   GLOSSECTOMY   CREATION, FREE FLAP   LARYNGOSCOPY, DIRECT   Number of specimens: 2   Name of specimens:   1. left submandibular lymph node-perm   2. total pharyngectomy, total glossectomy-perm   Which provider would you like to cc?->JOYCELYN HALL   Release to patient->Immediate   Specimen total (fresh, frozen, permanent):->2       04/27/22 1427  Specimen to Pathology, Surgery ENT Final result    Narrative:  Pre-op Diagnosis: Larynx cancer [C32.9]   Post-op Diagnosis: Same   Procedure(s):   LARYNGOSCOPY   Number of specimens: 1   Name of specimens:   1. Base of tongue - Permanent   Specimen total (fresh, frozen, permanent):->1       01/06/22 1558  Specimen to Pathology, Surgery ENT Final result    Narrative:  Pre-op Diagnosis: Larynx neoplasm malignant [C32.9]   Procedure(s):   LARYNGECTOMY   DISSECTION, NECK   CREATION, FREE FLAP   THYROIDECTOMY   LARYNGOSCOPY   REIMPLANTATION, PARATHYROID TISSUE   Number of specimens: 8   Name of specimens:   1. Left neck dissection levels 2, 3, and 4 - Permanent   2. Right neck dissection levels 2, 3, and 4 - Permanent   3. Rule out parathyroid - Frozen   4. Total laryngectomy, total thyroidectomy, bilateral level 6   neck dissection - Permanent   5. Posterior tracheal margin - Frozen   6. Anterior tracheal margin - Frozen   7. Posterior tracheal margin #2 - Frozen   8. Anterior tracheal margin #2 - Frozen   Release to patient->Immediate   Specimen total (fresh, frozen, permanent):->8       12/27/21 1302  Specimen to Pathology, Surgery ENT Final result     Narrative:  Pre-op Diagnosis: Hemoptysis [R04.2]   Procedure(s):   CREATION, TRACHEOSTOMY   LARYNGOSCOPY, DIRECT, WITH BRONCHOSCOPY   INSERTION, PEG TUBE   Number of specimens: 1   Name of specimens:   1) Cricoid- Permanent   Release to patient->Immediate   Specimen total (fresh, frozen, permanent):->1       12/09/21 1312  Specimen to Pathology, Surgery ENT Final result    Narrative:  Pre-op Diagnosis: Larynx neoplasm malignant [C32.9]   Adverse effect of radiation, sequela [T66.XXXS]   Procedure(s):   Suspension microlaryngoscopy with biopsy   Number of specimens: 2   Name of specimens:   1.) Anterior larynx- Permanent.   2.) Subglottis- Permanent.   Release to patient->Immediate   Specimen total (fresh, frozen, permanent):->2       05/06/21 1008  Specimen to Pathology, Surgery ENT Final result    Narrative:  Pre-op Diagnosis: Larynx neoplasm malignant [C32.9]   Post-op Diagnosis: Same   Procedure(s):   MICROLARYNGOSCOPY, SUSPENSION, USING LASER   Number of specimens: 1   Name of specimens:   1) Deep anterior right true vocal fold - Frozen   Specimen total (fresh, frozen, permanent):->1       04/01/21 1654  Specimen to Pathology, Surgery ENT Final result    Narrative:  Pre-op Diagnosis: Larynx neoplasm malignant [C32.9]   Procedure(s):   Suspension microlaryngoscopy with KTP laser excision of   lesion   Number of specimens: 5   Name of specimens:   1. Deep left true vocal fold - frozen   2. Anterior right true vocal fold - frozen   3. Infraglottic anterior right true vocal fold - frozen   4. Re-excision deep left true vocal fold, deep margin is   inked - frozen   5. 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 Anterior left true vocal fold - frozen   2. Anterior commissure - frozen   3. Deep left true vocal fold - frozen   4. Deep margin - frozen   5. Anterior commissure, infraglottic - frozen   6. Left subglottis - frozen   7. Left vocal fold lateral mucosal margin - frozen   8. Left vocal fold posterior mucosal margin - frozen   9.Deep anterior right true vocal fold - frozen   10. Right vocal fold posterior mucosal margin - frozen   11. 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 Left true vocal fold- frozen.   2. Left true vocal fold- Permanent.   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        TITLE: PLAN OF CARE FOR THE CHEMOTHERAPY PATIENT / TEACHING PROTOCOL    PURPOSE: To involve the patient / significant other in the plan of care and to provide teaching to the significant other & patient receiving chemotherapy.    LEVEL: Independent.    CONTENT: The Plan of Care for the chemotherapy patient is individualized and appropriate to the patients needs, strengths,  limitations, & goals.  Education includes information regarding chemotherapy side effects, the treatment itself, and self-care  Activities.    GOAL / OUTCOME STANDARDS    PHYSIOLOGIC: The client will remain free or experience minimal side effects or toxicities throughout the chemotherapy treatment period.     PSYCHOLOGIC: The client/significant others will demonstrate positive coping mechanisms in relation to chemotherapy and its side effects.      COGINITIVE: The client/significant others will verbalize understanding of self-care measure to avoid/minimize side effects of the chemotherapy regimen.    EVALUATION / COMMENT KEY:    V = Audiovisual/Video  S = Successfully meets outcome  N = Needs further instruction  NA = Not applicable to the patient  P = Previous knowledge  U = Unable to comprehend  * = See progress notes      PLAN OF CARE  INFORMATION TO BE DELIVERED / NURSING INTERVENTIONS DATE EVALUATION   Assessment of client/caregiver,         knowledge of cancer diagnosis,         and chemotherapy as a treatment. 1a. Evaluate patient/caregiver learning ability    b. Plan teaching sessions with patient/caregiver according to needs and present anxiety level/ability to learn.    c. Provide Chemotherapy Education Packet,        Mouth Care Protocol,         Specific Patient Education Sheets. 01/13/2023 S   Individual chemotherapy treatment         plan. 2a. Review of Chemotherapy Education handout from [facility]            01/13/2023   S   Knowledge Deficit & Self-Management of general side effects common to all chemotherapy:  Nausea/Vomiting  b.   Diarrhea  Mouth Care  Dental care  Constipation  Hair Loss  Potential for infection  Fatigue   3a. Reinforce that the majority of side effects from chemotherapy are reversible and are  controlled both in the hospital and at home        (blood counts recover, hair grows back).   b.  Refer to the following for reinforcement of         information post-treatment:  Mouth  Care Protocol.  Bowel Protocol for constipation or diarrhea.  3.  Drug Specific Chemotherapy Information Sheets for each medication patient receiving.    01/13/2023     S     PLAN OF CARE  INFORMATION TO BE DELIVERED / NURSING INTERVENTIONS DATE EVALUATION   h. Potential for bleeding         i. Potential anemia/fatigue         j. Potential sunburn         k. Birth control measures  l. Safety measures post treatment 4.  Chemotherapy Home Care Instruction  and Safety Information Sheet.  A. patient/caregivers to thoroughly cook shellfish (shrimp, crab, etc) to decrease the chance of infection.    B.  Use sunscreen and protective clothing while in the sun.   01/13/2023      Knowledge deficit & Self Management of Drug Specific  Side Effects.    BLADDER EFFECTS        (Hemorrhagic Cystitis)                  Preventable with adequate hydration; occurs 2-3 days or more post treatment.   1.  Instruct patient to:  a.   Void at least every 2 hours; increase intake.  b.   DO NOT hold urine; go when urge is felt.  c.    Empty bladder at bedtime and on         awakening.  d.   Observe for color changes (red to tea           colored), amount and frequency changes.  e.   Notify oncologist of any abnormalities           in urine or voiding or if you cannot               drink adequate fluids.   01/13/2023   S   b.   CHANGES IN URINE   COLOR:      1.   Instruct patient:  a.   Most evident in first 2-3 voidings after           administration.  Lasts less than 24 hours.  If urine is discolored 2 or more days post- treatment, notify oncologist.      01/13/2023 S   c.    KIDNEY EFFECTS           (Nephrotoxicity)   1.  Instruct patient to:  a.   Drink 8-16 glasses of fluid/day the day   pre-treatment and 3-4 days post-treatment to maintain hydration; the best way to minimize kidney problems.  b.   Notify oncologist immediately if unable to drink fluids or if changes are noted in urinary elimination.     01/13/2023   S   PULMONARY TOXICITY     Instruct patient to report symptoms such as shortness of breath, chest pain, shallow breathing, or chest wall discomfort to physician.  Reinforce preventative measures used by the health care team.  Baseline and periodic PFT and chest x-ray.   01/13/2023   S     PLAN OF CARE INFORMATION TO BE DELIVERED / NURSING INTERVENTIONS DATE EVALUATION   NERVE & MUSCLE EFFECTS (neurotoxocity; neuropathy, possible visual/hearing changes)        Instruct patient to:    Report numbness or tingling of the hands/feet, loss of fine motor movement (buttoning shirt, tying shoelaces), or gait changes to your oncologist.  If numbness/tingling are present:  protect feet with shoes at all times.  Use gloves for washing dishes/gardening & potholders in kitchen.       01/13/2023   S   CARDIOTOXICITY  Decreased effectiveness of             cardiac function. Effective are                  cumulative and irreversible.                                    CARDIAC ARRYTHMIAS              4   Instruct:  Heart function may be tested before treatment and perdiocally during treatment.  Notify oncologist of irregular pulse, palpitations, shortness of breath, or swelling in lower extremities/feet.         Chemotherapy can cause arrhythmias on infusion that resolve once infusion discontinued. Instruct nurse if any irregularity felt.    01/13/2023   S   EXTRAVASTION  Occurs when vesicants leak outside of vein and cause damage to the skin and underlying tissues.   Reinforce preventive measures used to avoid complications.  Fresh IV site or central line monitored continuously with vesicant IVP.  Continuous infusion via central line site and blood return monitored periodically around the clock.  Instruct to:  Notify nurse of any discomfort, burning, stinging, etc. at IV site during chemotherapy administration.  Notify oncologist of any redness, pain, or swelling at IV site after discharge from hospital.   01/13/2023   S   HYPERSENSITIVITY can happen with  any medication.   Instruct patient:  Nurse is with them during the initial part of treatment and will be close by to monitor.  Pre-medication ordered by the oncologist must be taken on time. If doses are missed, treatment will need to be re-scheduled.  Skin redness, itching, or hives appearing after discharge should be reported to oncologist. 01/13/2023   S       PLAN OF CARE INFORMATION TO BE DELIVERED / NURSING INTERVENTIONS DATE EVALUATION   FLU-LIKE SYNDROME      Instruct patient symptoms are hard to prevent and may include fever, shaking chills, muscle and body aches.  Taking prescribed medications from physician if needed.  Adequate fluids are important.    Reinforce the need to call if temperature is         elevated to 100.4 or more  01/13/2023   S   HAND-FOOT SYNDROME  causes painful, symmetric swelling and redness of palms and soles                  Instruct patient to report any numbness or tingling in the hands or feet.  Explain prevention techniques, such as     Use heavy moisturizers to lessen skin dryness and itching, but to avoid if skin is cracked or broken  Bathe in tepid water, use non-perfumed soap, and wash gently. Baths with oatmeal or diluted baking soda may be soothing.  Avoid tight fitting shoes and repetitive actions, such as rubbing hands or applying pressure to hands/feet.  Review measures to take should syndrome occur:  Cold compresses and elevation for          edema  Pain medications and other measures as ordered by oncologist.   4.   Syndrome resolves few weeks after therapy. 01/13/2023   S   5. DISCHARGE PLANNING /        EDUCATION 1.    Explain importance of compliance with follow- up  tests (CBC, CMP).  2.    Verify patient/caregiver know:  a.    Oncologists office phone number.  b.    Dates of follow-up appointments.  c.    Prescriptions given for nausea  3.   Review side effects to monitor and notify          oncologist about.  4.   Reinforce the need for patient and caregivers  to:  a.    Review information given.  b.    Call oncologists office with questions  or symptoms  5.   Provide Cancer [facility] [place] Brochure make referrals if needed for financial or .   01/13/2023   S     PROGRESS NOTES: I met with the patient Mr. Batres today for chemotherapy education. he will be starting treatment with Cisplatin . We discussed the mechanism of action, potential side effects of this treatment as well as ways he can manage them at home. Some of these side effects include but or not limited to fever, nausea, vomiting, decreased appetite, fatigue, weakness, cytopenias, myalgia/arthralgia, constipation, diarrhea, bleeding, headache, shortness of breath, nail changes, taste change, hair thinning/loss, mood disturbances, or edema. We also discussed dietary modifications he should make although this will be discussed in more detail with the dietician. he was provided with anti-emetic medication, a copy of all of the information we discussed today as well as our contact information. he will be provided a schedule on his first day of treatment. We will obtain labs on a weekly basis and the patient will follow-up with the physician for toxicity monitoring throughout treatment. All questions were answered and an informed consent was obtained. he was reminded to certainly contact us sooner if needed.  Attached to the patients folder and discussed with the patient the 24 hour/ 7 days a week after hours telephone number for the physician.  Patient notified to call anytime 24/7 because their is a physician on call for any problems that may arise.  Patient also notified to report to [facility] if they can not get in touch with a physician after hours.  Discussed the five wishes booklet with the patient and their family.           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 Local infection due to Port-A-Cath, initial encounter      Malignant neoplasm of base of  tongue  -     ondansetron (ZOFRAN) 8 MG tablet; Take 1 tablet (8 mg total) by mouth every 8 (eight) hours as needed for Nausea.  Dispense: 30 tablet; Refill: 2  -     promethazine (PHENERGAN) 25 MG tablet; Take 1 tablet (25 mg total) by mouth every 4 to 6 hours as needed for Nausea.  Dispense: 30 tablet; Refill: 2  -     CBC Auto Differential; Standing  -     CMP; Standing    Local infection due to Port-A-Cath, initial encounter  -     doxycycline (VIBRA-TABS) 100 MG tablet; Take 1 tablet (100 mg total) by mouth 2 (two) times daily. for 14 days  Dispense: 28 tablet; Refill: 0  -     Discontinue: levoFLOXacin (LEVAQUIN) 750 MG tablet; Take 1 tablet (750 mg total) by mouth once daily. for 7 days  Dispense: 7 tablet; Refill: 0  -     levoFLOXacin (LEVAQUIN) 250 mg/10 mL Soln; Take 30 mLs (750 mg total) by mouth once daily.  Dispense: 480 mL; Refill: 0  -     BLOOD CULTURE X'S 2; Future; Expected date: 01/13/2023    Other orders  -     sodium chloride 0.9% bolus 1,000 mL 1,000 mL  -     sodium chloride 0.9% flush 10 mL  -     heparin, porcine (PF) 100 unit/mL injection flush 500 Units       Cancer Staging   Larynx cancer  Staging form: Larynx - Glottis, AJCC 8th Edition  - Clinical stage from 8/6/2020: Stage II (cT2, cN0, cM0) - Signed by Fariha Muñoz NP on 8/6/2020  - Pathologic stage from 1/20/2022: Stage LOU (pT4a, pN0, cM0) - Signed by Fariha Muñoz NP on 1/20/2022  Staging form: Pharynx - P16 Negative Oropharynx, AJCC 8th Edition  - Clinical: Stage II (rcT2, cN0, cM0) - Signed by Matheus Portillo Jr., MD on 5/30/2022    Malignant neoplasm of base of tongue  Staging form: Pharynx - P16 Negative Oropharynx, AJCC 8th Edition  - Clinical stage from 5/20/2022: Stage II (cT2, cN0, cM0, p16-) - Signed by Saúl Kessler MD on 7/7/2022        Follow up in about 1 week (around 1/20/2023) for with me .     I have explained and the patient understands all of  the current recommendation(s). I have answered all of their  questions to the best of my ability and to their complete satisfaction.   The patient is to continue with the current management plan.          Medications Ordered:  Zofran 8mg 1 tab PO Q8h prn nausea  Phenergan 25mg PO Q4-6h prn nausea      Standing Labs Ordered:  CBC weekly  CMP weekly      Total Face to Face Time: 60 minutes face to face with the patient and their family discussing the chemotherapy side-effects and when to call our office.   Electronically signed by: Electronically signed by: Briana Martinez, MSN, APRN, AGNP-C

## 2023-01-12 NOTE — ANESTHESIA POSTPROCEDURE EVALUATION
Anesthesia Post Evaluation    Patient: Cyrus Batres Jr.    Procedure(s) Performed: Procedure(s) (LRB):  [id number] (Right)    Final Anesthesia Type: MAC      Patient location during evaluation: OPS  Patient participation: Yes- Able to Participate  Level of consciousness: awake and alert, oriented and awake  Post-procedure vital signs: reviewed and stable  Pain management: adequate  Airway patency: patent    PONV status at discharge: No PONV  Anesthetic complications: no      Cardiovascular status: blood pressure returned to baseline, hemodynamically stable and stable  Respiratory status: unassisted, spontaneous ventilation and room air  Hydration status: euvolemic  Follow-up not needed.          Vitals Value Taken Time   /58 01/12/23 1201   Temp 98.0 01/12/23 1201   Pulse 82 01/12/23 1201   Resp 16 01/12/23 1201   SpO2 100 % 01/12/23 1201         No case tracking events are documented in the log.      Pain/Alexi Score: No data recorded

## 2023-01-12 NOTE — DISCHARGE SUMMARY
[facility]  Discharge Note  Short Stay    Procedure(s) (LRB):  [id number] (Right)      OUTCOME: Patient tolerated treatment/procedure well without complication and is now ready for discharge.    DISPOSITION: Home or Self Care    FINAL DIAGNOSIS:  <principal problem not specified>    FOLLOWUP: In clinic    DISCHARGE INSTRUCTIONS:    Discharge Procedure Orders   Ice to affected area     Lifting restrictions   Order Comments: Please avoid lifting greater than 20 lb, straining, strenuous activity for one week.     Change dressing (specify)   Order Comments: Post-Operative Wound Care    A surgical glue has been placed over your incisions, please leave the glue in place and do not attempt to remove it.  It is ok to shower using mild soap and water over the incisions the day after your procedure. Pat dry your incisions. Do not soak in a bathtub or other body of water for 2 weeks or until cleared by your surgeon.     If you noticed redness, swelling, fever, increasing pain or significant drainage from your wound please call/message the office or the 24 hr nurse hotline after hours.     Notify your health care provider if you experience any of the following:  temperature >100.4     Notify your health care provider if you experience any of the following:  persistent nausea and vomiting or diarrhea     Notify your health care provider if you experience any of the following:  severe uncontrolled pain     Notify your health care provider if you experience any of the following:  redness, tenderness, or signs of infection (pain, swelling, redness, odor or green/yellow discharge around incision site)     Notify your health care provider if you experience any of the following:  worsening rash     Notify your health care provider if you experience any of the following:  increased confusion or weakness     Shower on day dressing removed (No bath)        TIME SPENT ON DISCHARGE: 10 minutes

## 2023-01-12 NOTE — ANESTHESIA PREPROCEDURE EVALUATION
01/12/2023  Cyrus Batres Jr. is a 72 y.o., male.    Patient Active Problem List   Diagnosis    Melanocytic nevus    Body mass index (BMI) of 29.0-29.9 in adult    Benign hypertension    Overweight    Paroxysmal atrial fibrillation    Type 2 diabetes mellitus with diabetic arthropathy, with long-term current use of insulin    Fatty liver disease, nonalcoholic    False positive serological test for hepatitis C    Hyperlipidemia    Type 2 diabetes mellitus with hyperglycemia, without long-term current use of insulin    Gastroesophageal reflux disease without esophagitis    Type 2 diabetes mellitus with hyperglycemia    Vitamin B12 deficiency    Hyperuricemia    Hypovitaminosis D    Proteinuria    On enteral nutrition    Larynx cancer    Dehydration    NSVT (nonsustained ventricular tachycardia)    Adverse effect of adrenal cortical steroids, sequela    S/P laryngectomy    Hypocalcemia    Aphonia    Dysphagia, pharyngoesophageal    Acute pain of both shoulders    Bilateral arm weakness    Oral bleeding    Malignant neoplasm of base of tongue    Advanced care planning/counseling discussion    Iron deficiency anemia due to chronic blood loss    Postoperative hypothyroidism    Pressure injury of sacral region, stage 1    Pneumatosis intestinalis    Nausea and vomiting    Neutropenia    Abnormal CT scan, neck    Open wound of neck    Open wnd of pharynx    Open wound of left thigh    Open wound of mouth    Tracheostomy in place    Malnutrition of mild degree    Type 2 diabetes mellitus, without long-term current use of insulin    Laryngeal cancer    Hypocalcemia    Hyperbilirubinemia    Elevated transaminase level    Hyponatremia    PEG (percutaneous endoscopic gastrostomy) adjustment/replacement/removal    Hypothyroidism    Dehydration    Pharyngocutaneous  fistula    Protein malnutrition       Past Surgical History:   Procedure Laterality Date    DIRECT LARYNGOBRONCHOSCOPY N/A 12/27/2021    Procedure: LARYNGOSCOPY, DIRECT, WITH BRONCHOSCOPY;  Surgeon: Flex Espinosa MD;  [facility];  Service: ENT;  Laterality: N/A;    DIRECT LARYNGOSCOPY  11/9/2022    Procedure: LARYNGOSCOPY, DIRECT;  Surgeon: Jesse James MD;  [facility];  Service: ENT;;    DISSECTION OF NECK Bilateral 1/6/2022    Procedure: DISSECTION, NECK;  Surgeon: Jesse James MD;  [facility];  Service: ENT;  Laterality: Bilateral;    DISSECTION OF NECK Bilateral 11/9/2022    Procedure: DISSECTION, NECK;  Surgeon: Jesse James MD;  [facility];  Service: ENT;  Laterality: Bilateral;    FLAP PROCEDURE Right 1/6/2022    Procedure: CREATION, FREE FLAP;  Surgeon: Alise Hart MD;  Location: [address];  Service: ENT;  Laterality: Right;  Ischemic start 1351  Ischemic stop 1502    FLAP PROCEDURE Left 11/9/2022    Procedure: CREATION, FREE FLAP, ALT;  Surgeon: Alise Hart MD;  Location: [facility] OR [address] [place] [address];  Service: ENT;  Laterality: Left;    GLOSSECTOMY Bilateral 11/9/2022    Procedure: TOTAL GLOSSECTOMY;  Surgeon: Jesse James MD;  Location: [facility] OR [address] [place] [address];  Service: ENT;  Laterality: Bilateral;    INSERTION OF TUNNELED CENTRAL VENOUS CATHETER (CVC) WITH SUBCUTANEOUS PORT N/A 6/9/2022    Procedure: [id number];  Surgeon: Jesus Viera MD;  Location: [facility] OR;  Service: General;  Laterality: N/A;    LARYNGECTOMY N/A 1/6/2022    Procedure: LARYNGECTOMY;  Surgeon: Jesse James MD;  Location: [address];  Service: ENT;  Laterality: N/A;    LARYNGOSCOPY N/A 8/4/2020    Procedure: Suspension microlaryngoscopy with biopsy, possible KTP laser treatment/excision;  Surgeon: Stew Noel MD;  Location: [facility] OR [address] [place] [address];  Service: ENT;  Laterality: N/A;  Microscope, telescopes, tower,  microinstruments, KTP laser, rep conf# [id number] IC 7/28.    LARYNGOSCOPY N/A 3/16/2021    Procedure: Suspension microlaryngoscopy with excision of lesion, possible CO2 laser;  Surgeon: Stew Noel MD;  [facility];  Service: ENT;  Laterality: N/A;  Microscope, telescopes, tower, microinstruments, CO2 laser, rep conf# [id number] IC 3/4.    LARYNGOSCOPY N/A 4/1/2021    Procedure: Suspension microlaryngoscopy with KTP laser excision of lesion;  Surgeon: Stew Noel MD;  [facility];  Service: ENT;  Laterality: N/A;  Microscope, telescopes, tower, microinstruments, 70 degree scope, vocal fold , KTP laser, rep conf# [id number] BC    LARYNGOSCOPY N/A 12/9/2021    Procedure: Suspension microlaryngoscopy with biopsy;  Surgeon: Stew Noel MD;  [address]  Service: ENT;  Laterality: N/A;  Microscope, telescopes, tower, microinstruments    LARYNGOSCOPY N/A 1/6/2022    Procedure: LARYNGOSCOPY;  Surgeon: Jesse James MD;  Location: [address];  Service: ENT;  Laterality: N/A;    LARYNGOSCOPY N/A 4/27/2022    Procedure: LARYNGOSCOPY WITH BIOPSY;  Surgeon: Jesse James MD;  Location: [address];  Service: ENT;  Laterality: N/A;    MICROLARYNGOSCOPY N/A 3/17/2020    Procedure: MICROLARYNGOSCOPY;  Surgeon: Jung Xiao MD;  [facility];  Service: ENT;  Laterality: N/A;  Laser Microlaryngoscopy  NEED TO SCHEDULE LASER from [facility] [phone]    PHARYNGECTOMY  11/9/2022    Procedure: TOTAL PHARYNGECTOMY;  Surgeon: Jesse James MD;  Location: [facility] OR [address] [place] [address];  Service: ENT;;    REIMPLANTATION OF PARATHYROID TISSUE N/A 1/6/2022    Procedure: REIMPLANTATION, PARATHYROID TISSUE;  Surgeon: Jesse James MD;  [facility];  Service: ENT;  Laterality: N/A;    SKIN SPLIT GRAFT Right 11/9/2022    Procedure: APPLICATION, GRAFT, SKIN, SPLIT-THICKNESS;  Surgeon: Jesse James MD;   [facility];  Service: ENT;  Laterality: Right;    THYROIDECTOMY  1/6/2022    Procedure: THYROIDECTOMY;  Surgeon: Jesse James MD;  [facility];  Service: ENT;;    TRACHEOSTOMY N/A 12/27/2021    Procedure: CREATION, TRACHEOSTOMY;  Surgeon: Flex Espinosa MD;  [facility];  Service: ENT;  Laterality: N/A;        Tobacco Use:  The patient  reports that he has never smoked. He has never used smokeless tobacco.     Results for orders placed or performed during the hospital encounter of 11/22/22   EKG 12-lead    Collection Time: 11/22/22  2:11 AM    Narrative    Test Reason : R06.02,    Vent. Rate : 108 BPM     Atrial Rate : 108 BPM     P-R Int : 132 ms          QRS Dur : 076 ms      QT Int : 336 ms       P-R-T Axes : 008 -06 026 degrees     QTc Int : 450 ms    Sinus tachycardia  Otherwise normal ECG  No previous ECGs available  Confirmed by Andre DONOVAN, Brown ELLISON ([id number]) on 11/24/2022 11:40:03 AM    Referred By: [email or web address]   SELF           Confirmed By:Brown Powell MD             Lab Results   Component Value Date    WBC 4.63 01/09/2023    HGB 10.6 (L) 01/09/2023    HCT 32.3 (L) 01/09/2023    MCV 96 01/09/2023     01/09/2023     BMP  Lab Results   Component Value Date     01/09/2023    K 4.1 01/09/2023     01/09/2023    CO2 26 01/09/2023    BUN 19 01/09/2023    CREATININE 0.8 01/09/2023    CALCIUM 8.6 (L) 01/09/2023    ANIONGAP 9 01/09/2023     01/09/2023     (H) 12/13/2022     (H) 12/12/2022       No results found for this or any previous visit.            Pre-op Assessment    I have reviewed the Patient Summary Reports.     I have reviewed the Nursing Notes. I have reviewed the NPO Status.   I have reviewed the Medications.     Review of Systems  Anesthesia Hx:  No problems with previous Anesthesia  Denies Family Hx of Anesthesia complications.   Denies Personal Hx of Anesthesia complications.   Social:  Non-Smoker     Hematology/Oncology:         -- Anemia: Current/Recent Cancer. (tongue cancer) --  Cancer in past history (throat cancer):    EENT/Dental:EENT/Dental Normal   Cardiovascular:   Hypertension Dysrhythmias atrial fibrillation ECG has been reviewed.    Pulmonary:  Pulmonary Normal    Renal/:  Renal/ Normal     Hepatic/GI:   GERD Liver Disease,    Musculoskeletal:  Musculoskeletal Normal    Neurological:  Neurology Normal    Endocrine:   Diabetes, type 2 Hypothyroidism    Psych:  Psychiatric Normal       Hypokalemia    Physical Exam  General: Well nourished, Cooperative, Alert and Oriented    Airway:  Mallampati: II   Mouth Opening: Normal  TM Distance: Normal  Tongue: Normal  Neck ROM: Normal ROM  Pre-Existing Airway: Tracheostomy tube    Dental:  Intact    Chest/Lungs:  Clear to auscultation, Normal Respiratory Rate    Heart:  Rate: Normal  Rhythm: Regular Rhythm        Anesthesia Plan  Type of Anesthesia, risks & benefits discussed:    Anesthesia Type: MAC  Intra-op Monitoring Plan: Standard ASA Monitors  Induction:  IV  Informed Consent: Informed consent signed with the Patient and all parties understand the risks and agree with anesthesia plan.  All questions answered.   ASA Score: 3  Anesthesia Plan Notes: MAC/Local  S/p laryngectomy  Will intubate through the tacheostomy if needed.  No ofirmev - elevated LFT's        Ready For Surgery From Anesthesia Perspective.     .

## 2023-01-13 ENCOUNTER — LAB VISIT (OUTPATIENT)
Dept: LAB | Facility: HOSPITAL | Age: 73
End: 2023-01-13
Attending: INTERNAL MEDICINE
Payer: MEDICARE

## 2023-01-13 ENCOUNTER — PATIENT MESSAGE (OUTPATIENT)
Dept: SURGERY | Facility: CLINIC | Age: 73
End: 2023-01-13
Payer: MEDICARE

## 2023-01-13 ENCOUNTER — TELEPHONE (OUTPATIENT)
Dept: HEMATOLOGY/ONCOLOGY | Facility: CLINIC | Age: 73
End: 2023-01-13

## 2023-01-13 ENCOUNTER — TELEPHONE (OUTPATIENT)
Dept: SURGERY | Facility: CLINIC | Age: 73
End: 2023-01-13
Payer: MEDICARE

## 2023-01-13 ENCOUNTER — OFFICE VISIT (OUTPATIENT)
Dept: HEMATOLOGY/ONCOLOGY | Facility: CLINIC | Age: 73
End: 2023-01-13
Payer: MEDICARE

## 2023-01-13 VITALS
HEIGHT: 66 IN | WEIGHT: 130.88 LBS | HEART RATE: 88 BPM | BODY MASS INDEX: 21.04 KG/M2 | RESPIRATION RATE: 18 BRPM | SYSTOLIC BLOOD PRESSURE: 124 MMHG | TEMPERATURE: 97 F | DIASTOLIC BLOOD PRESSURE: 67 MMHG

## 2023-01-13 DIAGNOSIS — T80.212A LOCAL INFECTION DUE TO PORT-A-CATH, INITIAL ENCOUNTER: ICD-10-CM

## 2023-01-13 DIAGNOSIS — T80.212A LOCAL INFECTION DUE TO PORT-A-CATH, INITIAL ENCOUNTER: Primary | ICD-10-CM

## 2023-01-13 DIAGNOSIS — C01 MALIGNANT NEOPLASM OF BASE OF TONGUE: ICD-10-CM

## 2023-01-13 DIAGNOSIS — C01 MALIGNANT NEOPLASM OF BASE OF TONGUE: Primary | ICD-10-CM

## 2023-01-13 LAB
ALBUMIN SERPL BCP-MCNC: 4.5 G/DL (ref 3.5–5.2)
ALP SERPL-CCNC: 523 U/L (ref 55–135)
ALT SERPL W/O P-5'-P-CCNC: 48 U/L (ref 10–44)
ANION GAP SERPL CALC-SCNC: 10 MMOL/L (ref 8–16)
AST SERPL-CCNC: 48 U/L (ref 10–40)
BASOPHILS # BLD AUTO: 0.03 K/UL (ref 0–0.2)
BASOPHILS NFR BLD: 0.7 % (ref 0–1.9)
BILIRUB SERPL-MCNC: 1.5 MG/DL (ref 0.1–1)
BUN SERPL-MCNC: 18 MG/DL (ref 8–23)
CALCIUM SERPL-MCNC: 8.3 MG/DL (ref 8.7–10.5)
CHLORIDE SERPL-SCNC: 102 MMOL/L (ref 95–110)
CO2 SERPL-SCNC: 27 MMOL/L (ref 23–29)
CREAT SERPL-MCNC: 1 MG/DL (ref 0.5–1.4)
DIFFERENTIAL METHOD: ABNORMAL
EOSINOPHIL # BLD AUTO: 0.1 K/UL (ref 0–0.5)
EOSINOPHIL NFR BLD: 3.1 % (ref 0–8)
ERYTHROCYTE [DISTWIDTH] IN BLOOD BY AUTOMATED COUNT: 15.2 % (ref 11.5–14.5)
EST. GFR  (NO RACE VARIABLE): >60 ML/MIN/1.73 M^2
GLUCOSE SERPL-MCNC: 96 MG/DL (ref 70–110)
HCT VFR BLD AUTO: 32.8 % (ref 40–54)
HGB BLD-MCNC: 10.8 G/DL (ref 14–18)
IMM GRANULOCYTES # BLD AUTO: 0.03 K/UL (ref 0–0.04)
IMM GRANULOCYTES NFR BLD AUTO: 0.7 % (ref 0–0.5)
LYMPHOCYTES # BLD AUTO: 0.5 K/UL (ref 1–4.8)
LYMPHOCYTES NFR BLD: 11 % (ref 18–48)
MCH RBC QN AUTO: 31.6 PG (ref 27–31)
MCHC RBC AUTO-ENTMCNC: 32.9 G/DL (ref 32–36)
MCV RBC AUTO: 96 FL (ref 82–98)
MONOCYTES # BLD AUTO: 0.4 K/UL (ref 0.3–1)
MONOCYTES NFR BLD: 7.8 % (ref 4–15)
NEUTROPHILS # BLD AUTO: 3.4 K/UL (ref 1.8–7.7)
NEUTROPHILS NFR BLD: 76.7 % (ref 38–73)
NRBC BLD-RTO: 0 /100 WBC
PLATELET # BLD AUTO: 224 K/UL (ref 150–450)
PMV BLD AUTO: 10.6 FL (ref 9.2–12.9)
POTASSIUM SERPL-SCNC: 3.7 MMOL/L (ref 3.5–5.1)
PROT SERPL-MCNC: 8.1 G/DL (ref 6–8.4)
RBC # BLD AUTO: 3.42 M/UL (ref 4.6–6.2)
SODIUM SERPL-SCNC: 139 MMOL/L (ref 136–145)
WBC # BLD AUTO: 4.47 K/UL (ref 3.9–12.7)

## 2023-01-13 PROCEDURE — 36415 COLL VENOUS BLD VENIPUNCTURE: CPT | Performed by: NURSE PRACTITIONER

## 2023-01-13 PROCEDURE — 99215 PR OFFICE/OUTPT VISIT, EST, LEVL V, 40-54 MIN: ICD-10-PCS | Mod: S$GLB,,, | Performed by: NURSE PRACTITIONER

## 2023-01-13 PROCEDURE — 1101F PT FALLS ASSESS-DOCD LE1/YR: CPT | Mod: CPTII,S$GLB,, | Performed by: NURSE PRACTITIONER

## 2023-01-13 PROCEDURE — 3078F PR MOST RECENT DIASTOLIC BLOOD PRESSURE < 80 MM HG: ICD-10-PCS | Mod: CPTII,S$GLB,, | Performed by: NURSE PRACTITIONER

## 2023-01-13 PROCEDURE — 85025 COMPLETE CBC W/AUTO DIFF WBC: CPT | Performed by: NURSE PRACTITIONER

## 2023-01-13 PROCEDURE — 3288F FALL RISK ASSESSMENT DOCD: CPT | Mod: CPTII,S$GLB,, | Performed by: NURSE PRACTITIONER

## 2023-01-13 PROCEDURE — 87040 BLOOD CULTURE FOR BACTERIA: CPT | Performed by: NURSE PRACTITIONER

## 2023-01-13 PROCEDURE — 3078F DIAST BP <80 MM HG: CPT | Mod: CPTII,S$GLB,, | Performed by: NURSE PRACTITIONER

## 2023-01-13 PROCEDURE — 3008F BODY MASS INDEX DOCD: CPT | Mod: CPTII,S$GLB,, | Performed by: NURSE PRACTITIONER

## 2023-01-13 PROCEDURE — 1101F PR PT FALLS ASSESS DOC 0-1 FALLS W/OUT INJ PAST YR: ICD-10-PCS | Mod: CPTII,S$GLB,, | Performed by: NURSE PRACTITIONER

## 2023-01-13 PROCEDURE — 3074F SYST BP LT 130 MM HG: CPT | Mod: CPTII,S$GLB,, | Performed by: NURSE PRACTITIONER

## 2023-01-13 PROCEDURE — 1125F PR PAIN SEVERITY QUANTIFIED, PAIN PRESENT: ICD-10-PCS | Mod: CPTII,S$GLB,, | Performed by: NURSE PRACTITIONER

## 2023-01-13 PROCEDURE — 3008F PR BODY MASS INDEX (BMI) DOCUMENTED: ICD-10-PCS | Mod: CPTII,S$GLB,, | Performed by: NURSE PRACTITIONER

## 2023-01-13 PROCEDURE — 80053 COMPREHEN METABOLIC PANEL: CPT | Performed by: NURSE PRACTITIONER

## 2023-01-13 PROCEDURE — 3074F PR MOST RECENT SYSTOLIC BLOOD PRESSURE < 130 MM HG: ICD-10-PCS | Mod: CPTII,S$GLB,, | Performed by: NURSE PRACTITIONER

## 2023-01-13 PROCEDURE — 3288F PR FALLS RISK ASSESSMENT DOCUMENTED: ICD-10-PCS | Mod: CPTII,S$GLB,, | Performed by: NURSE PRACTITIONER

## 2023-01-13 PROCEDURE — 99215 OFFICE O/P EST HI 40 MIN: CPT | Mod: S$GLB,,, | Performed by: NURSE PRACTITIONER

## 2023-01-13 PROCEDURE — 1159F PR MEDICATION LIST DOCUMENTED IN MEDICAL RECORD: ICD-10-PCS | Mod: CPTII,S$GLB,, | Performed by: NURSE PRACTITIONER

## 2023-01-13 PROCEDURE — 1159F MED LIST DOCD IN RCRD: CPT | Mod: CPTII,S$GLB,, | Performed by: NURSE PRACTITIONER

## 2023-01-13 PROCEDURE — 1125F AMNT PAIN NOTED PAIN PRSNT: CPT | Mod: CPTII,S$GLB,, | Performed by: NURSE PRACTITIONER

## 2023-01-13 RX ORDER — PROMETHAZINE HYDROCHLORIDE 25 MG/1
25 TABLET ORAL
Qty: 30 TABLET | Refills: 2 | Status: SHIPPED | OUTPATIENT
Start: 2023-01-13 | End: 2023-02-22

## 2023-01-13 RX ORDER — ONDANSETRON HYDROCHLORIDE 8 MG/1
8 TABLET, FILM COATED ORAL EVERY 8 HOURS PRN
Qty: 30 TABLET | Refills: 2 | Status: SHIPPED | OUTPATIENT
Start: 2023-01-13 | End: 2023-02-22

## 2023-01-13 RX ORDER — DOXYCYCLINE HYCLATE 100 MG
100 TABLET ORAL 2 TIMES DAILY
Qty: 28 TABLET | Refills: 0 | Status: SHIPPED | OUTPATIENT
Start: 2023-01-13 | End: 2023-01-27

## 2023-01-13 RX ORDER — LEVOFLOXACIN 25 MG/ML
750 SOLUTION ORAL DAILY
Qty: 480 ML | Refills: 0 | Status: SHIPPED | OUTPATIENT
Start: 2023-01-13 | End: 2023-01-13 | Stop reason: SDUPTHER

## 2023-01-13 RX ORDER — LEVOFLOXACIN 25 MG/ML
750 SOLUTION ORAL DAILY
Qty: 480 ML | Refills: 0 | Status: SHIPPED | OUTPATIENT
Start: 2023-01-13 | End: 2023-02-07

## 2023-01-13 RX ORDER — SODIUM CHLORIDE 0.9 % (FLUSH) 0.9 %
10 SYRINGE (ML) INJECTION
Status: CANCELLED | OUTPATIENT
Start: 2023-01-23

## 2023-01-13 RX ORDER — LEVOFLOXACIN 750 MG/1
750 TABLET ORAL DAILY
Qty: 7 TABLET | Refills: 0 | Status: SHIPPED | OUTPATIENT
Start: 2023-01-13 | End: 2023-01-13

## 2023-01-13 RX ORDER — HEPARIN 100 UNIT/ML
500 SYRINGE INTRAVENOUS
Status: CANCELLED | OUTPATIENT
Start: 2023-01-23

## 2023-01-13 NOTE — LETTER
[facility] - Hematology Oncology  [address]  [place] [address]  Phone: [phone]  Fax: [phone] January 13, 2023    Cyrus Batres Jr.  [address]  [place] [address]      To Whom It May Concern:    Cyrus Batres is unable to participate in jury duty due to his active cancer diagnosis and the need for chemotherapy and radiation. He will not be able to post pone his treatment.     If you have any questions or concerns, please feel free to call my office.    Sincerely,            Briana Martinez NP

## 2023-01-13 NOTE — TELEPHONE ENCOUNTER
----- Message from Abdulaziz Le Jr., MD sent at 1/13/2023  2:27 PM CST -----  Can you schedule this patient to see me Wednesday to evaluate his port please. Thank

## 2023-01-18 ENCOUNTER — OFFICE VISIT (OUTPATIENT)
Dept: SURGERY | Facility: CLINIC | Age: 73
End: 2023-01-18
Payer: MEDICARE

## 2023-01-18 VITALS
DIASTOLIC BLOOD PRESSURE: 67 MMHG | SYSTOLIC BLOOD PRESSURE: 99 MMHG | RESPIRATION RATE: 16 BRPM | HEART RATE: 116 BPM | WEIGHT: 130.94 LBS | BODY MASS INDEX: 21.04 KG/M2 | HEIGHT: 66 IN | TEMPERATURE: 98 F

## 2023-01-18 DIAGNOSIS — Z45.2 ENCOUNTER FOR CARE RELATED TO VASCULAR ACCESS PORT: Primary | ICD-10-CM

## 2023-01-18 LAB
BACTERIA BLD CULT: NORMAL
BACTERIA BLD CULT: NORMAL

## 2023-01-18 PROCEDURE — 1126F AMNT PAIN NOTED NONE PRSNT: CPT | Mod: CPTII,S$GLB,, | Performed by: SURGERY

## 2023-01-18 PROCEDURE — 1159F PR MEDICATION LIST DOCUMENTED IN MEDICAL RECORD: ICD-10-PCS | Mod: CPTII,S$GLB,, | Performed by: SURGERY

## 2023-01-18 PROCEDURE — 1101F PR PT FALLS ASSESS DOC 0-1 FALLS W/OUT INJ PAST YR: ICD-10-PCS | Mod: CPTII,S$GLB,, | Performed by: SURGERY

## 2023-01-18 PROCEDURE — 99024 PR POST-OP FOLLOW-UP VISIT: ICD-10-PCS | Mod: S$GLB,,, | Performed by: SURGERY

## 2023-01-18 PROCEDURE — 3288F FALL RISK ASSESSMENT DOCD: CPT | Mod: CPTII,S$GLB,, | Performed by: SURGERY

## 2023-01-18 PROCEDURE — 1159F MED LIST DOCD IN RCRD: CPT | Mod: CPTII,S$GLB,, | Performed by: SURGERY

## 2023-01-18 PROCEDURE — 99024 POSTOP FOLLOW-UP VISIT: CPT | Mod: S$GLB,,, | Performed by: SURGERY

## 2023-01-18 PROCEDURE — 3078F PR MOST RECENT DIASTOLIC BLOOD PRESSURE < 80 MM HG: ICD-10-PCS | Mod: CPTII,S$GLB,, | Performed by: SURGERY

## 2023-01-18 PROCEDURE — 3008F BODY MASS INDEX DOCD: CPT | Mod: CPTII,S$GLB,, | Performed by: SURGERY

## 2023-01-18 PROCEDURE — 3288F PR FALLS RISK ASSESSMENT DOCUMENTED: ICD-10-PCS | Mod: CPTII,S$GLB,, | Performed by: SURGERY

## 2023-01-18 PROCEDURE — 3078F DIAST BP <80 MM HG: CPT | Mod: CPTII,S$GLB,, | Performed by: SURGERY

## 2023-01-18 PROCEDURE — 3074F SYST BP LT 130 MM HG: CPT | Mod: CPTII,S$GLB,, | Performed by: SURGERY

## 2023-01-18 PROCEDURE — 3008F PR BODY MASS INDEX (BMI) DOCUMENTED: ICD-10-PCS | Mod: CPTII,S$GLB,, | Performed by: SURGERY

## 2023-01-18 PROCEDURE — 3074F PR MOST RECENT SYSTOLIC BLOOD PRESSURE < 130 MM HG: ICD-10-PCS | Mod: CPTII,S$GLB,, | Performed by: SURGERY

## 2023-01-18 PROCEDURE — 1126F PR PAIN SEVERITY QUANTIFIED, NO PAIN PRESENT: ICD-10-PCS | Mod: CPTII,S$GLB,, | Performed by: SURGERY

## 2023-01-18 PROCEDURE — 1101F PT FALLS ASSESS-DOCD LE1/YR: CPT | Mod: CPTII,S$GLB,, | Performed by: SURGERY

## 2023-01-18 NOTE — PROGRESS NOTES
GENERAL SURGERY  POST-OP PROGRESS NOTE    HPI: Cyrus Batres Jr. is a 72 y.o. male status post port placement for recurrent or ensure. Here today to evaluate the port.  Postop day 1 he had surrounding erythema was started on doxycycline per Oncology. Patient reports following day the redness had resolved already.  Has not used a port yet.  No redness.  No pain. No swelling.    VITALS:  Vitals:    01/18/23 1409   BP: 99/67   Pulse: (!) 116   Resp: 16   Temp: 98.4 °F (36.9 °C)       PHYSICAL EXAM:  Right-sided subclavian port in place, incision appears well healed, there is no surrounding erythema or edema        ASSESSMENT & PLAN:  72 y.o. male s/p port placement for oropharyngeal cancer  -redness has resolved  -I suspect it may been related to surgical dissection and or local anesthetic utilized during the case however of encourage patient to continue the antibiotics until they are completed  -will send a message to Oncology that is okay to use from surgical standpoint  -return to clinic p.r.n.

## 2023-01-19 ENCOUNTER — PATIENT MESSAGE (OUTPATIENT)
Dept: HEMATOLOGY/ONCOLOGY | Facility: CLINIC | Age: 73
End: 2023-01-19

## 2023-01-19 NOTE — NURSING
Small 7 beat run of paroxymal Afib with aberrancy. Pt asymptomatic with VSS unchanged and Hx of Afib. Strip posted in chart. WCTM        HOSPITALIST ATTENDING PROGRESS NOTE    Chart and meds reviewed.  Patient seen and examined.    CC: CP    Subjective: Pt with some pelvic pain, when moving in bed. Denies CP, SOB.    All other systems reviewed and found to be negative with the exception of what has been described above.    MEDICATIONS  (STANDING):  aspirin  chewable 81 milliGRAM(s) Oral daily  donepezil 10 milliGRAM(s) Oral at bedtime  enoxaparin Injectable 40 milliGRAM(s) SubCutaneous every 24 hours  escitalopram 10 milliGRAM(s) Oral daily  levothyroxine 25 MICROGram(s) Oral daily  lidocaine   4% Patch 1 Patch Transdermal daily  memantine 10 milliGRAM(s) Oral daily  naloxone Injectable 0.4 milliGRAM(s) IV Push once  nitroglycerin     SubLingual 0.4 milliGRAM(s) SubLingual every 5 minutes  oxybutynin 5 milliGRAM(s) Oral two times a day  pantoprazole    Tablet 40 milliGRAM(s) Oral before breakfast  polyethylene glycol 3350 17 Gram(s) Oral daily    MEDICATIONS  (PRN):  acetaminophen     Tablet .. 975 milliGRAM(s) Oral every 8 hours PRN Temp greater or equal to 38C (100.4F), Moderate Pain (4 - 6)  aluminum hydroxide/magnesium hydroxide/simethicone Suspension 30 milliLiter(s) Oral every 4 hours PRN Dyspepsia  cyclobenzaprine 5 milliGRAM(s) Oral every 8 hours PRN Spasm  melatonin 3 milliGRAM(s) Oral at bedtime PRN Insomnia  ondansetron Injectable 4 milliGRAM(s) IV Push every 8 hours PRN Nausea and/or Vomiting  traMADol 50 milliGRAM(s) Oral every 4 hours PRN Moderate Pain (4 - 6)      VITALS:  T(F): 97.4 (01-19-23 @ 07:49), Max: 97.9 (01-18-23 @ 21:05)  HR: 97 (01-19-23 @ 07:49) (96 - 97)  BP: 165/82 (01-19-23 @ 07:49) (159/78 - 165/82)  RR: 17 (01-19-23 @ 07:49) (17 - 18)  SpO2: 93% (01-19-23 @ 07:49) (93% - 96%)  Wt(kg): --    I&O's Summary      CAPILLARY BLOOD GLUCOSE          PHYSICAL EXAM:  Gen: NAD  HEENT:  pupils equal and reactive, EOMI, no oropharyngeal lesions, erythema, exudates, oral thrush  NECK:   supple, no carotid bruits, no palpable lymph nodes, no thyromegaly  CV:  +S1, +S2, regular, no murmurs or rubs  RESP:   lungs clear to auscultation bilaterally, no wheezing, rales, rhonchi, good air entry bilaterally  BREAST:  not examined  GI:  abdomen soft, non-tender, non-distended, normal BS, no bruits, no abdominal masses, no palpable masses  RECTAL:  not examined  :  not examined  MSK:   normal muscle tone, no atrophy, no rigidity, no contractions  EXT:  no clubbing, no cyanosis, no edema, no calf pain, swelling or erythema  VASCULAR:  pulses equal and symmetric in the upper and lower extremities  NEURO:  AAOX3, no focal neurological deficits, follows all commands, able to move extremities spontaneously  SKIN:  no ulcers, lesions or rashes    LABS:    CULTURES:  CULTURES:  no new    Additional results/Imaging, I have personally reviewed:  CTH 1/16/23: No acute intracranial hemorrhage, mass effect, or evidence of acute vascular territorial infarction. If clinical symptoms persist or worsen, more sensitive evaluation with brain MRI may be obtained, if no contraindications exist.    CT cspine 1/17/23: No evidence of acute osseous injury.    L hip, pelvis xray 1/17/23: Fractures around the low left pelvis with some displacement. CAT scan may be advisable.    L hip, pelvis xray 1/17/23: 1. Slightly comminuted and displaced superior and inferior pubic rami fractures. 2. Minor degenerative changes of both hips with no fracture on either side.    CT bony pelvis 1/17/23: Acute comminuted displaced fractures of left superior and inferior pubic rami. Acute comminuted fracture of the left hemisacrum.    L femur xray 1/17/23: 1. No acute fracture or dislocation is seen involving the left femur with only mild degenerative arthropathy of the left hip and left knee. 2. Left pubic ring fracture discussed under separate cover.    Echo 1/17/23: The mitral valve leaflets appear thickened. EA reversal of the mitral inflow consistent with reduced compliance of the left ventricle. Moderate (2+) mitral regurgitation is present. The aortic valve is well visualized, appears calcified. Valve opening seems to be normal. Mild to Moderate aortic regurgitation is present. Normal appearing tricuspid valve structure. Mild to Moderate Tricuspid regurgitation is present. Mild pulmonary hypertension. Pulmonic valve not well seen. No pulmonic valvular regurgitation is seen. Normal appearing left atrium. The left ventricle is normal in size, wall thickness, wall motion and contractility. Estimated left ventricular ejection fraction is 55-60 %. Normal appearing right atrium. Normal appearing right ventricle structure and function.    Telemetry, personally reviewed:  1/17/23: sinus   1/18/23: sinus , d/c tele

## 2023-01-20 ENCOUNTER — OUTPATIENT CASE MANAGEMENT (OUTPATIENT)
Dept: ADMINISTRATIVE | Facility: OTHER | Age: 73
End: 2023-01-20
Payer: MEDICARE

## 2023-01-20 RX ORDER — HEPARIN 100 UNIT/ML
500 SYRINGE INTRAVENOUS
Status: CANCELLED | OUTPATIENT
Start: 2023-01-20

## 2023-01-20 RX ORDER — SODIUM CHLORIDE 0.9 % (FLUSH) 0.9 %
10 SYRINGE (ML) INJECTION
Status: CANCELLED | OUTPATIENT
Start: 2023-01-20

## 2023-01-22 NOTE — PROGRESS NOTES
[facility] Hematology/Oncology  PROGRESS NOTE -  Follow-up Visit      Subjective:       Patient ID:   NAME: Cyrus Batres Jr. : 1950     72 y.o. 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  The patient is here today to go over the results of the recently ordered labs, tests and studies.     He is getting chemotherapy today. 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 He had his portacath removed on  and replaced on 2023 by Dr Le    He is breathing ok. No HA's or CP; no pain at this time        Discussed covid19 and he has been vaccinated      ROS:   GEN: normal without any fever, night sweats or weight loss; he has gained some weight  HEENT: normal with no HA's, sore throat, stiff neck, changes in vision  CV: normal with no CP, SOB, PND, GRIFFIN or orthopnea  PULM: normal with no SOB, cough, hemoptysis, sputum or pleuritic pain  GI: chronic N/V with the chemotherapy; has peg tube; reflux  : normal with no hematuria, dysuria  BREAST: normal with no mass, discharge, pain  SKIN: normal with no rash, erythema, bruising, or swelling     Pain Scale:  0    Allergies:  Review of patient's allergies indicates:   Allergen Reactions    Lovastatin Itching    Pollen extracts     Lovastatin Rash     Not confirmed but pt skeptical  "      Medications:    Current Outpatient Medications:     acetaminophen (TYLENOL) 325 MG tablet, Take 325 mg by mouth every 6 (six) hours as needed for Pain., Disp: , Rfl:     BD ULTRA-FINE YULISSA PEN NEEDLE 32 gauge x 5/32" Ndle, To be used with Novolog pen up to 4x a day, Disp: 100 each, Rfl: 3    calcitrioL (ROCALTROL) 1 mcg/mL solution, Take 0.25 mLs (0.25 mcg total) by Per G Tube route once daily., Disp: 15 mL, Rfl: 12    calcium carbonate (TUMS) 200 mg calcium (500 mg) chewable tablet, 2 tablets (1,000 mg total) by Per G Tube route 5 (five) times daily., Disp: 300 tablet, Rfl: 11    calcium carbonate 500 mg/5 mL (1,250 mg/5 mL), 10 mLs (1,000 mg total) by Per G Tube route 3 (three) times daily with meals., Disp: 473 mL, Rfl: 12    doxycycline (VIBRA-TABS) 100 MG tablet, Take 1 tablet (100 mg total) by mouth 2 (two) times daily. for 14 days, Disp: 28 tablet, Rfl: 0    esomeprazole (NEXIUM) 40 MG capsule, 40 mg before breakfast., Disp: , Rfl:     famotidine (PEPCID) 40 mg/5 mL (8 mg/mL) suspension, 2.5 mLs (20 mg total) by Per G Tube route 2 (two) times daily., Disp: 50 mL, Rfl: 11    ibuprofen (ADVIL,MOTRIN) 200 MG tablet, Take 200 mg by mouth every 6 (six) hours as needed for Pain., Disp: , Rfl:     insulin aspart U-100 (NOVOLOG FLEXPEN U-100 INSULIN) 100 unit/mL (3 mL) InPn pen, Inject 0-10 Units into the skin as needed; Add correction scale if needed while on TF.  Blood sugar 180-230 add 2 units, 231-280 +4 units, 281-330 +6 units, 331-380 +8 units, >380 +10 units.  MDD 40 units, Disp: 12 mL, Rfl: 0    levoFLOXacin (LEVAQUIN) 250 mg/10 mL Soln, Take 30 mLs (750 mg total) by mouth once daily., Disp: 480 mL, Rfl: 0    levothyroxine (SYNTHROID) 100 MCG tablet, 1 tablet (100 mcg total) by Per G Tube route before breakfast., Disp: 30 tablet, Rfl: 11    losartan (COZAAR) 100 MG tablet, Take 0.5 tablets (50 mg total) by mouth once daily. (Patient taking differently: Take 50 mg by mouth every evening.), Disp: 45 " tablet, Rfl: 3    metFORMIN (GLUCOPHAGE) 500 MG tablet, Take 1 tablet (500 mg total) by mouth 2 (two) times daily with meals., Disp: 60 tablet, Rfl: 3    ondansetron (ZOFRAN) 8 MG tablet, Take 1 tablet (8 mg total) by mouth every 8 (eight) hours as needed for Nausea., Disp: 30 tablet, Rfl: 2    promethazine (PHENERGAN) 25 MG tablet, Take 1 tablet (25 mg total) by mouth every 4 to 6 hours as needed for Nausea., Disp: 30 tablet, Rfl: 2    traZODone (DESYREL) 50 MG tablet, TAKE 1 TABLET BY MOUTH NIGHTLY AS NEEDED FOR INSOMNIA., Disp: 90 tablet, Rfl: 1    zolpidem (AMBIEN) 5 MG Tab, 1 tablet (5 mg total) by Per G Tube route nightly as needed (difficult with sleep)., Disp: 30 tablet, Rfl: 0  No current facility-administered medications for this visit.    Facility-Administered Medications Ordered in Other Visits:     CISplatin (Platinol) 40 mg/m2 = 66 mg in sodium chloride 0.9% 631 mL chemo infusion, 40 mg/m2 (Treatment Plan Recorded), Intravenous, 1 time in Clinic/HOD, Venu Tamez MD    heparin, porcine (PF) 100 unit/mL injection flush 500 Units, 500 Units, Intravenous, PRN, Venu Tamez MD    sodium chloride 0.9% bolus 1,000 mL 1,000 mL, 1,000 mL, Intravenous, 1 time in Clinic/HOD, Venu Tamez MD    sodium chloride 0.9% flush 10 mL, 10 mL, Intravenous, PRN, Venu Tamez MD    PMHx/PSHx Updates:  See patient's last visit with me on 12/27/2022  See H&P on 9/23/2022        Pathology:   Cancer Staging   Larynx cancer  Staging form: Larynx - Glottis, AJCC 8th Edition  - Clinical stage from 8/6/2020: Stage II (cT2, cN0, cM0) - Signed by Fariha Muñoz NP on 8/6/2020  - Pathologic stage from 1/20/2022: Stage LOU (pT4a, pN0, cM0) - Signed by Fariha Muñoz NP on 1/20/2022  Staging form: Pharynx - P16 Negative Oropharynx, AJCC 8th Edition  - Clinical: Stage II (rcT2, cN0, cM0) - Signed by Matheus Portillo Jr., MD on 5/30/2022    Malignant neoplasm of base of tongue  Staging form: Pharynx - P16  "Negative Oropharynx, AJCC 8th Edition  - Clinical stage from 5/20/2022: Stage II (cT2, cN0, cM0, p16-) - Signed by Saúl Kessler MD on 7/7/2022    Dissection 11/9/2022:    Final Pathologic Diagnosis 1. "left submandibular lymph node", dissection:       - Three lymph nodes, negative for carcinoma (0/3)   2. 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       - Tumor size: 4.5 cm       - Resection margins: left superior pharyngeal margin focally involved;   all other margins are negative for carcinoma       - Left internal jugular vein: free of tumor       - One lymph node, negative for carcinoma (0/1)   Note: P16 immunostain is negative     Tumor Site       Oropharynx  involving Base of tongue       Tumor Laterality       Midline       Tumor Size       Greatest Dimension (Centimeters) 4.5 cm         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)       Histologic Grade       G2 to G3: Moderate to Poorly differentiated       Lymphovascular Invasion       Not identified       Perineural Invasion       Not identified     MARGINS      Margins       Involved by invasive tumor     Margin(s)   Involved by Invasive Tumor:      left superior pharyngeal margin; all other   margins are free of tumor     LYMPH NODES    Regional Lymph Node Status:    :     Regional Lymph Node   Status:      All regional lymph nodes negative for tumor      pT Category:               pT3   pN Category:               pN0      Base of Tongue Biopsy  4/27/2022:  Final Pathologic Diagnosis BIOPSY OF BASE OF THE TONGUE:   THE INFILTRATING POORLY DIFFERENTIATED SQUAMOUS CELL CARCINOMA   THE TUMOR IS P16 NEGATIVE.  THE POSITIVE AND NEGATIVE CONTROLS STAINED   APPROPRIATELY      Larynx resection/thyroidectomy 1/6/2022:     Final Pathologic Diagnosis 1. Lymph nodes, left neck levels 2,  3 and 4, dissection:   - Nineteen lymph nodes, all  " negative  for metastatic carcinoma (0/19)   2. Lymph nodes, right neck levels 2,  3 and 4, dissection:   - Twenty-eight lymph nodes, all  negative  for metastatic carcinoma (0/28)   3. Possible parathyroid gland, excision:   - Benign parathyroid gland tissue (0.003 g)   4. 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 Soft tissue, posterior tracheal margin, excision:   - Benign, focally reactive respiratory mucosa with submucosal acute   inflammation,  negative  for dysplasia or malignancy   6. Soft tissue, anterior tracheal margin, excision:   - Benign respiratory mucosa with subepithelial cartilage,  negative  for   dysplasia or malignancy   7. Soft tissue, posterior tracheal margin #2, excision:   - Benign, focally reactive respiratory mucosa with submucosal acute   inflammation,  negative  for dysplasia or malignancy   8. Soft tissue, anterior tracheal margin #2, excision:   - Benign, reactive focally ulcerated respiratory mucosa with submucosal acute   inflammation,  negative  for dysplasia or malignancy             Laryngoscope 8/4/2020: (with Dr Noel):  Final Pathologic Diagnosis 1.  Left true vocal fold, biopsy:       -  Invasive moderately differentiated squamous cell carcinoma,   keratinizing type   2.  Left true vocal fold, biopsy:       -  Invasive moderately differentiated squamous cell carcinoma,   keratinizing type             Laryngoscope 3/17/2020 (with Dr Xiao):  Final Pathologic Diagnosis 1.  BIOPSY OF LEFT TRUE VOCAL CORD:  "  SEVERELY DYSPLASTIC APPEARING SQUAMOUS MUCOSA   INVASIVE CARCINOMA IS NOT DOCUMENTED   ON THE OTHER HAND, THESE FRAGMENTS ARE NODUL AND WITHOUT SUBMUCOSA FOR   EVALUATION; IT IS POSSIBLE THAT THEY REFLECT INVASIVE SQUAMOUS CARCINOMA   2.  BIOPSY OF LEFT ANTERIOR COMMISSURE:   MODERATE DYSPLASIA   NO INVASIVE CARCINOMA IDENTIFIED             Objective:     Vitals:  Blood pressure 135/78, pulse 95, temperature 98 °F (36.7 °C), resp. rate 18, height 5' 6" (1.676 m), weight 58.4 kg (128 lb 12 oz).    Physical Examination:   GEN: no apparent distress, comfortable; AAOx3  HEAD: atraumatic and normocephalic  EYES: no pallor, no icterus, PERRLA  ENT: OMM, no pharyngeal erythema, external ears WNL; no nasal discharge; no thrush; s/p laryngectomy with flap since healed well; trach   NECK: no masses, thyroid normal, trachea midline, no LAD/LN's, supple; post-op dissection healed well;    CV: RRR with no murmur; normal pulse; normal S1 and S2; no pedal edema; portacath (new)  CHEST: Normal respiratory effort; CTAB; normal breath sounds; no wheeze or crackles  ABDOM: nontender and nondistended; soft; normal bowel sounds; no rebound/guarding; peg tube  MUSC/Skeletal: ROM normal; no crepitus; joints normal; no deformities or arthropathy  EXTREM: no clubbing, cyanosis, inflammation or swelling  SKIN: no rashes, lesions, ulcers, petechiae or subcutaneous nodules  : no mahan  NEURO: grossly intact; motor/sensory WNL; AAOx3; no tremors  PSYCH: normal mood, affect and behavior  LYMPH: normal cervical, supraclavicular, axillary and groin LN's          Labs:     Lab Results   Component Value Date    WBC 4.47 01/13/2023    HGB 10.8 (L) 01/13/2023    HCT 32.8 (L) 01/13/2023    MCV 96 01/13/2023     01/13/2023     CMP  Sodium   Date Value Ref Range Status   01/13/2023 139 136 - 145 mmol/L Final     Potassium   Date Value Ref Range Status   01/13/2023 3.7 3.5 - 5.1 mmol/L Final     Chloride   Date Value Ref Range Status "   01/13/2023 102 95 - 110 mmol/L Final     CO2   Date Value Ref Range Status   01/13/2023 27 23 - 29 mmol/L Final     Glucose   Date Value Ref Range Status   01/13/2023 96 70 - 110 mg/dL Final     BUN   Date Value Ref Range Status   01/13/2023 18 8 - 23 mg/dL Final     Creatinine   Date Value Ref Range Status   01/13/2023 1.0 0.5 - 1.4 mg/dL Final     Calcium   Date Value Ref Range Status   01/13/2023 8.3 (L) 8.7 - 10.5 mg/dL Final     Total Protein   Date Value Ref Range Status   01/13/2023 8.1 6.0 - 8.4 g/dL Final     Albumin   Date Value Ref Range Status   01/13/2023 4.5 3.5 - 5.2 g/dL Final   11/18/2020 3.7 3.6 - 5.1 g/dL Final     Comment:     For additional information, please refer to   http://education.[employer]ic.Lendino/faq/HKJ819 (This link is   being provided for informational/ educational purposes only.)  This test was developed and its analytical performance   characteristics have been determined by [email or web address]. It has not been cleared or approved by the   [place] Food and Drug Administration. This assay has been validated   pursuant to the CLIA regulations and is used for clinical   purposes.  @ Test Performed By:  [email or web address] [place]  Yo Cortes M.D.,   [address] [place] [id number]  [place]  [id number]       Total Bilirubin   Date Value Ref Range Status   01/13/2023 1.5 (H) 0.1 - 1.0 mg/dL Final     Comment:     For infants and newborns, interpretation of results should be based  on gestational age, weight and in agreement with clinical  observations.    Premature Infant recommended reference ranges:  Up to 24 hours.............<8.0 mg/dL  Up to 48 hours............<12.0 mg/dL  3-5 days..................<15.0 mg/dL  6-29 days.................<15.0 mg/dL       Alkaline Phosphatase   Date Value Ref Range Status   01/13/2023 523 (H) 55 - 135 U/L Final     AST   Date Value Ref Range Status   01/13/2023 48 (H) 10 - 40 U/L  Final     ALT   Date Value Ref Range Status   01/13/2023 48 (H) 10 - 44 U/L Final     Anion Gap   Date Value Ref Range Status   01/13/2023 10 8 - 16 mmol/L Final     eGFR if    Date Value Ref Range Status   07/29/2022 >60.0 >60 mL/min/1.73 m^2 Final     eGFR if non    Date Value Ref Range Status   07/29/2022 >60.0 >60 mL/min/1.73 m^2 Final     Comment:     Calculation used to obtain the estimated glomerular filtration  rate (eGFR) is the CKD-EPI equation.          Lab Results   Component Value Date    IRON 30 (L) 11/25/2022    TRANSFERRIN 114 (L) 11/25/2022    TIBC 169 (L) 11/25/2022    FESATURATED 18 (L) 11/25/2022            Radiology/Diagnostic Studies:      CTA Neck    Result Date: 9/20/2022  EXAMINATION: CTA NECK CLINICAL HISTORY: Head/neck cancer, monitor;Malignant neoplasm of base of tongue TECHNIQUE: CT angiogram was performed from the level of the scott to the EAC following the IV administration of 75mL of Omnipaque 350.   Sagittal and coronal reconstructions and maximum intensity projection reconstructions were performed. Arterial stenosis percentages are based on NASCET measurement criteria. COMPARISON: CTA neck dated 05/20/2022 FINDINGS: Aortic arch and great vessels: There is a conventional left-sided 3 vessel arch.  The aortic arch is widely patent.  There is stable soft and calcified plaque in the proximal left subclavian artery with a similar appearance the prior study and possibly within radiation field but without critical stenosis or occlusion.  The right subclavian artery is patent.  The brachiocephalic trunk and origin of the left common carotid artery are patent. Carotid arteries Right carotid artery: There is calcified plaque scattered in the right common carotid artery without critical stenosis, occlusion or change.  There is no measurable stenosis of the internal carotid artery which is patent to the skull base. Left carotid artery: There is mild plaque  scattered in the left common carotid artery without change and without critical stenosis or occlusion.  There is no measurable stenosis of the internal carotid artery. Vertebral arteries: The left vertebral artery is dominant and patent throughout its course in the neck.  The right vertebral artery is hypoplastic but patent.  There is no critical stenosis, occlusion, thrombus or dissection.  There is no change. The study extends intracranially.  There is calcified plaque in the V4 segment of the left vertebral artery resulting in a moderate to marked short segment nonocclusive stenosis.  The right vertebral artery partially terminates in a posteroinferior cerebellar artery with a short segment moderate to marked stenosis of the very distal right vertebral artery.  Some flow within the distal right vertebral artery may represent retrograde flow from the dominant left vertebral artery.  The basilar artery is patent.  The origins of the superior cerebellar and posterior cerebral artery on the right are patent.  There is fetal origin of the left posterior cerebral artery which is patent. There is plaque in the cavernous segments of both internal carotid arteries but there is no critical stenosis or large vessel occlusion of the anterior circulation vessels.  There is no large aneurysm. Other: There is a similar appearance of the included upper lungs with pleural and parenchymal changes anteriorly in the upper lobes bilaterally.  As previously discussed, timing of the contrast bolus is performed during the arterial phase for CTA neck.  Therefore, enhancement of the soft tissues is somewhat suboptimal due to this technique. There has been a large region of soft tissue resection in the anterior mid and lower neck soft tissues with continued open defect in the upper chest and lower neck above the manubrium.  Soft tissue seen along the left posterolateral border of the trachea could represent secretions or mucus.  This was  present previously. Redemonstrated is a large heterogeneously enhancing lesion centered at the level of the tongue base and floor of the mouth centrally into the left.  There is scattered calcifications.  The prior CTA demonstrated regions of central necrosis which are no longer definitely present but diffusely heterogeneous density and enhancement likely represents regions of necrosis.  This large heterogeneously enhancing soft tissue mass extends more anteriorly in the floor of the mouth on the left and measures at least 5.6 cm in greatest anterior to posterior dimension with 3.6 cm transverse dimension.  This does extend anteriorly to the medial margin of the body of the mandible on the left. There are post treatment changes with stranding in the neck fat.     1. Similar appearance of the neck vessels when compared to the prior CTA neck with mild narrowing at the origin of the left subclavian artery.  There is no critical stenosis, occlusion, thrombosis or dissection involving the cervical vertebral or carotid arteries. 2. Larger mass within the hypopharynx and tongue base extending anteriorly to the floor of the mouth on the left to the medial margin of the body of the mandible without obvious bony destruction. 3. There is nonocclusive stenosis of the distal V4 segment of the left vertebral artery without change. 4. There is no large vessel occlusion or critical stenosis of the anterior circulation. 5. There is developmental variation with fetal origin of the left posterior cerebral artery. Electronically signed by: Rex Joyner MD Date:    09/20/2022 Time:    11:31    CT Abdomen Pelvis With Contrast    Result Date: 9/1/2022  CMS MANDATED QUALITY DATA - CT RADIATION - 436 All CT scans at this facility utilize dose modulation, iterative reconstruction, and/or weight based dosing when appropriate to reduce radiation dose to as low as reasonably achievable. Reason: abdominal pain partial history of laryngeal  carcinoma TECHNIQUE: CT abdomen and pelvis with 100 mL Omnipaque 350. COMPARISON: PET/CT 5/13/2022 CT ABDOMEN: Coronary artery calcification and extremely tiny hiatal hernia incidentally noted. Visualized lung bases are clear. Liver, gallbladder, pancreas, spleen, adrenals, and kidneys are normal. Mild aortoiliac calcifications present. Gastrostomy tube tip lies in distal gastric body. Small intestines are unremarkable. Mild wall thickening affects the ascending colon with pneumatosis intestinalis diffusely affecting the ascending colon. No other free intraperitoneal gas or, and remainder of colon is normal. A normal appendix is present. The major mesenteric vascular structures are patent. No acute osseous abnormality. CT PELVIS: Prostate is slightly enlarged. Bladder is normal. No free pelvic fluid. Tiny right and small to moderate left fat-containing inguinal hernias are present. No acute osseous abnormality. IMPRESSION: 1. Pneumatosis intestinalis affecting the ascending colon with associated mild wall thickening. Etiology of this is undetermined. Potential considerations include intestinal ischemia or pneumatosis related to chemotherapy. Clinical and laboratory correlation is needed to assess significance. No portal venous gas or free intraperitoneal air however. 2. Coronary artery calcifications Electronically signed by:  Nael Hui MD  9/1/2022 6:20 PM CDT Workstation: [id number]    NM PET CT Routine Skull to Mid Thigh    Result Date: 9/27/2022  PET CT WITH IMAGE FUSION HISTORY:  restage tongue cancer RESTAGING...  HX: TONGUE CA.  DX: April 2022.  LAST CHEMO TREATMENT WAS 3WKS AGO.  EVALUATE TX RESPONSE.   ALSO HX OF LARYNGEAL CA IN AUGUST 2020.  MULTIPLE SURGERIES: LARYNGECTOMY, THYROIDECTOMY & TRACHEOSTOMY.  RAD TX WAS COMPLETED IN NOV 2020. TECHNIQUE: Following IV administration of 11.2 mCi of F-18 labeled FDG into right antecubital fossa and a 60 minute delay, PET CT was performed from the vertex of the  skull through the proximal thighs with an integrated PET CT scanner with image fusion. CT images were obtained to aid in attenuation correction and PET localization. The patient's serum glucose at the time of the exam was 136 mg/dL. COMPARISON: PET/CT 5/13/2022 FINDINGS: Poorly characterized soft tissue mass involving base of tongue approximately measures 4.3 x 2.8 cm (series 3 image 65) appearing similar to the prior exam. This reaches current max SUV of 11.8, not significantly changed from prior of 11.4. No other abnormal FDG activity. Intracranial compartment is unremarkable. Trace bilateral maxillary sinus mucosal thickening is present. Postoperative changes of laryngectomy and tracheostomy are present. Surgical clips occur throughout the neck. No definite enlarged cervical or supraclavicular lymph node, with evaluation limited by lack of IV contrast. Left subclavian port catheter tip terminates in SVC. Diffuse coronary artery calcifications are present. No enlarged mediastinal or axillary lymph nodes. No pulmonary nodule or mass. Gastrostomy tube tip lies in gastric body. Moderate aortoiliac calcifications are present. Small fat-containing left inguinal hernia incidentally noted. Mild degenerative changes affect the spine. No suspicious osseous abnormality. IMPRESSION: 1. No significant change in the FDG avid mass involving the tongue base, remaining characteristic of malignancy. 2. No new FDG avid malignancy or metastatic disease. Electronically signed by:  Nael Hui MD  9/27/2022 2:38 PM CDT Workstation: [id number]    CT Abdomen Pelvis  Without Contrast    Result Date: 9/4/2022  CMS MANDATED QUALITY DATA - CT RADIATION  436 All CT scans at this facility utilize dose modulation, iterative reconstruction, and/or weight based dosing when appropriate to reduce radiation dose to as low as reasonably achievable. CT ABDOMEN PELVIS WITHOUT IV CONTRAST CLINICAL HISTORY: 72 years Male Follow-up pneumatosis  COMPARISON: CT abdomen and pelvis September 1, 2022 FINDINGS: Imaging through the lower thorax demonstrates subsegmental atelectasis of the dependent lower lobes. Coronary artery calcification. Bone window images show no acute or aggressive osseous abnormality. Transitional lumbosacral vertebral body. On this unenhanced exam, no focal hepatic lesion. Gallbladder and biliary tree are unremarkable. Spleen appears normal. Pancreas is unremarkable. No adrenal lesion. No renal calculi or hydronephrosis. Ureters are normal in caliber. Urinary bladder is within normal limits. Gastrostomy tube is in place within the stomach. Stomach is largely collapsed. No evidence of small bowel obstruction. Pneumatosis and pericolonic abscess involving the ascending colon is redemonstrated, slightly diminished compared to prior. Mild wall thickening throughout the colon. No free fluid or free air within the abdomen or pelvis. Aortoiliac atherosclerotic calcification. No pathologically enlarged lymph nodes within the abdomen or pelvis. Bilateral fat-containing inguinal hernias, larger on the left. IMPRESSION: Pneumatosis and pericolonic gas about the ascending colon has decreased in volume compared to prior. Persistent colonic wall thickening which could indicate colitis. Coronary and aortic atherosclerosis. Gastrostomy tube is within the stomach. Bilateral inguinal hernias. Electronically signed by:  Jose De Jesus Oleary MD  9/4/2022 4:06 PM CDT Workstation: [id number]    CTA neck  5/20/2022:     IMPRESSION:  Persistent mass within the hypopharynx consistent with malignancy.  By my measurements it is larger than on April 24, 2022 with central necrosis.  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 This is similar to the previous April 2022 study.  No evidence of arterial compromise related to malignancy.     PET 5/13/2022:     IMPRESSION:  1. Postoperative changes of prior  laryngectomy and lymph node resection/radiation.  2. Masslike FDG avid lesion to the left of midline at the tongue base concerning for residual/recurrent disease.  3. Adjacent confluent nodular extension along the inferior left side of the mass.  4. No FDG avid lymphadenopathy or convincing additional sites of disease in the chest, abdomen or pelvis.     CT head 5/10/2022:  FINDINGS: Comparison to multiple prior exams. There is no acute intracranial hemorrhage, with no mass effect or abnormal extra-axial fluid. Mild scattered areas of nonspecific hypoattenuation involve the deep periventricular white matter, with gray-white differentiation maintained.     There is mild generalized prominence of the cortical sulci and ventricles. The cerebellum and brainstem are unremarkable. There are carotid siphon vascular calcifications. The visualized paranasal sinuses and mastoid air cells are clear. There is no acute calvarial fracture or scalp hematoma.     IMPRESSION: No acute intracranial hemorrhage or acute calvarial fracture     CT soft neck 4/24/2022;     Oral tongue/tongue base:  At the base of the tongue on the left there are findings of a heterogeneously enhancing mass measuring 2.1 cm highly concerning for a neoplastic process.     True and false cords:  Patient status post laryngectomy which has been performed in the interim. Soft tissue stranding in the lower neck likely related to a previous flat reconstruction. No enhancement or lymphadenopathy within the lower neck.     Lymph node assessment:Negative     Surrounding soft tissues:  Negative     Vasculature:  Negative     Osseous structures:  Negative     Lung apices:  Scarring involving the lung apices bilaterally likely related to previous radiation.     IMPRESSION:  1. Postoperative and radiation changes involving the lower neck.  2. Findings highly concerning for a developing mass at the left base of the tongue enhancing 2.1 cm region. Direct visualization in  this region would be of benefit.        CT soft neck  12/24/2021  IMPRESSION:  1.  Laryngeal mass as on prior exam. Laryngeal airway narrowing has not changed.  2.  No evidence for active hemorrhage.  3.  Partially visualized patchy groundglass infiltrates in both upper lobes. Also see CT chest report     CTA Chest 12/24/2021:  IMPRESSION:     1.  No pulmonary embolism.     2.  Soft tissue stranding in the region of the strap musculature with ill-defined hypodensity measuring 2.8 x 1.4 cm in the cervical midline.  Findings concerning for infectious process or abscess. Neoplastic process could have similar imaging characteristics.     3.  Suggested erosion of the proximal right parasymphyseal aspect of the thyroid shield.  Correlated with CT soft tissue neck findings. Findings could represent osteomyelitis. Neoplastic process cannot be excluded.     4.  Hepatic steatosis.  5.  Thickening of the gastric wall. Prominence of perigastric vasculature. Small hiatal hernia.     6.  Moderate stool burden.  Correlate for constipation.        PET 12/15/2021:  IMPRESSION:     Substantial qualitative and quantitative increase of hypermetabolic FDG activity associated with the larynx in this patient with known supraglottic neoplasm.     No evidence of FDG avid metastatic disease involving neck, chest, abdomen or pelvis.     PET 8/21/2020:     Impression:     1. Intensely hypermetabolic plaque-like mass along the left true vocal cord, compatible with known laryngeal carcinoma.  2. No findings of regional metastatic disease in the neck, or distant metastatic disease.        CT Chest 8/10/2020:     IMPRESSION:     No metastatic disease within the chest.  Three-vessel coronary artery calcification. Nondilated cardiac  chambers     CT Soft neck 7/22/2020:  IMPRESSION:  1. Slight interval increase in size of the previously described  enhancing nodule along the anterior left vocal cord.  2. No pathologic lymphadenopathy.  3. Additional  and incidental findings as noted above.     CT Soft neck  3/16/2020:     Impression:     6 mm enhancing focus involving the superior aspect of the left focal cord near the anterior commisure.  Recommend direct visualization for further evaluation of laryngeal polyp     Question of 2 mm submucosal lipoma involving the midportion of the superior aspect of the left vocal cord.     Mild arteriosclerosis involving the brachiocephalic arteries and carotid arteries     Mild degenerative change of the cervical spine        I have reviewed all available lab results and radiology reports.    Assessment/Plan:   (1) 72 y.o. 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     - Patient has been under the care of Dr Khoobehi with [facility] and Dr Portillo with rad/onc.   - He was recently found to have a new primary cancer involving the base of his tongue in April 2022. He was deemed not to be a candidate for any further radiation and did not want to undergo any further surgery as this would necessitate a glossectomy procedure.   - He has been on adjuvant chemotherapy per direction of Dr Khoobehi and has had 3 cycles. His therapy course has been complicated with persistent diarrhea and N/V.      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 He saw Dr Lucero with Rad/onc this am. They are going to present his case to H&N Tumor Board at [facility] [place] and plans to see Dr James about surgical options. If he is not a surgical  candidate then will proceed with cisplatin and XRT combine therapy.     10/6/2022:  - He saw Dr Lucero with Rad/onc, and Dr James and his case was presented to H&N Tumor Board at [facility] [place] and  he general consensus was to proceed to surgery.  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   - He had his portacath removed on 11/28.   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, keratinizing type, moderate to poorly differentiated tumor  - Four LN's were all negative  - he did have a positive left superior pharyngeal margin  - pT3 pN0  - reviewed the latest NCCN guidelines again from Version 1.2023; he may need some further systemic therapy due to the margin  - check on Rad/onc follow-up     1/23/2023:  - s/p new portacath placed on 1/12/2023 with Dr Le  - starting combined chemotherapy and XRT - cisplatin  - s/p chemotherapy school with [facility]         (2) Hx/of Laryngeal cancer in Aug 2020 stage II (cT2 N0)  - s/p radiation followed by bilateral neck dissection and total thyroidectomy     Brief Oncology Summary for his laryngeal CA hx:     Patient was noted to initially have a suspicious lesion on the left true vocal cord by laryngoscopy with Dr Xiao in March 2020 with biopsy showing severe dysplastic changes without definitive invasive process. He had a CT scan on 3/16/2020 which showed a 6mm nodule on the left vocal cord. A repeat Ct scan four months later on 7/22/2020 showed the nodule enlarged to 1.4 x 1.1 cm in size. Patient was then seen  by Dr Noel and underwent repeat scope in Aug 2020 who found a bulky deeply invading tumor which was debulked and the pathology coming back moderately differentiated SCCA without lymphovascular or perineural invasion. Hs case was subsequently presented to the tumor board and the consensus was to proceed with radiation. He completed XRT on 10/30/2020 and proceeded with regular laryngoscopy surveillance. He eventually required salvage laryngectomy and neck dissection with flap with Dr James on Jan 6th 2022. 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 Fifty lymph nodes were removed which were all negative. Pathology TNM was pT4a, pN0. Patient did not require any adjuvant therapy afterwards.      (2) HTN and hypercholesterolemia     (3) Paroxysmal atrial fibrillation, V tach     (4) DM II     (5) B12 deficiency      (6) Fatty liver disease, GERD           VISIT DIAGNOSES:      S/P laryngectomy    Tracheostomy in place    Malignant neoplasm of base of tongue    Larynx cancer    Laryngeal cancer    Iron deficiency anemia due to chronic blood loss    Vitamin B12 deficiency    Abnormal CT scan, neck          PLAN:  Starting systemic therapy with Cisplat and XRT - s/p chemotherapy school with [facility] - discuss the side-effect profile, provided literature and obtained consents  F/u Rad/onc follow-up   F/u with Dr Jamse with ENT and Dr Lucero with rad/onc  Check labs weekly  F/u with PCP, ENT, etc  Dietician f/u on the tube feeds     RTC in 1 week with Whit and 2 weeks with myself  Fax note to  Bandar/Dameon Lucero Hasney, Yesenia Gomez       Discussion:     COVID-19 Discussion:     I had long discussion with patient and any applicable family about the COVID-19 coronavirus epidemic and the recommended precautions with regard to cancer and/or hematology patients. I have re-iterated  "the CDC recommendations for adequate hand washing, use of hand -like products, and coughing into elbow, etc. In addition, especially for our patients who are on chemotherapy and/or our otherwise immunocompromised patients, I have recommended avoidance of crowds, including movie theaters, restaurants, churches, etc. I have recommended avoidance of any sick or symptomatic family members and/or friends. I have also recommended avoidance of any raw and unwashed food products, and general avoidance of food items that have not been prepared by themselves. The patient has been asked to call us immediately with any symptom developments, issues, questions or other general concerns.         Pathology Discussion:     I reviewed and discussed the pathology report(s) and radiograph reports (if available) in as simple to understand and/or laymen's terms to the best of my ability. I had an indepth conversation with the patient and went over the patient's individual diagnosis based on the information that was currently available. I discussed the TNM staging process with regard to the patient's particular cancer type, and the calculated stage based on the currently available TNM data and literature. I discussed the available prognostic data with regard to the current staging information and how it relates to the prognosis of their particular neoplastic process.          NCCN Guidelines:     I discussed the available treatment option(s) in accordance with the latest literature from the NCCN Clinical Practice Guidelines for the patient's particular type of cancer disorder. The NCCN Guidelines provide a "document evidence-based (and) consensus-driven management" of the care of oncology patients. The treatment recommendations were made not only in accordance to the NCCN guidelines, but also factored in to account the patient's overall age, condition, performance status and their medical co-morbidities. I went over the risks and " benefits of the the treatment options (if any could be made) with regard to their particular cancer type, their cancer stage, their age, and their co-morbidities.         Chemotherapy Discussion:      I discussed the available treatment option(s) in accordance with the latest/current national evidence-based guidelines (NCCN, UpToDate, NCI, ASCO, etc where applicable), their overall age/condition and their co-morbidities. I also went over the risks and benefits of the chemotherapy with regard to their particular cancer type, their cancer stage, their age/condition, and their co-morbidities. I provided literature on the chemotherapy regimen and discussed the chemotherapy side-effect profiles of the drug(s). I discussed the importance of compliance with obtaining and monitoring weekly lab work, and went over the potential hematopathology issues and risks with anemia, leucopenia and thrombocytopenia that can occur with chemotherapy. I discussed the potential risks of liver and kidney damage, which could be permanent and could necessitate dialysis long-term if kidney failure developed. I discussed the emetic and/or diarrheal potential of the regimen and the potential need for use of antiemetic and anti-diarrheal medications. I discussed the risk for development of anaphylactic shock, bronchospasm, dysrhythmia, and respiratory/cardiovascular arrest and/or failure. I discussed the potential risks for development of alopecia, cold sensory issues, ringing in ears, vertigo, cataracts, glaucoma, and neuropathy, all of which could end up being chronic and life-long. Some chemotherpyI discussed the risks of hand-foot syndrome and rashes, and development of other autoimmune mediated processes such as pneumonitis, hepatitis, and colitis which could be life threatening. I discussed the risks of the potential development of a rare but fatal viral mediated disease known as PML (Progressive Multifocal Leukoencephalopathy), and risk of  future development of leukemia and/or lymphoma from use of certain chemotherapy agents. I discussed the need for neutropenic precautions, basic hygiene/sanitation behaviors and dietary restrictions.    The patient's consent has been obtained to proceed with the chemotherapy.The patient will be referred to Chemotherapy School /[facility] for training and education on chemotherapy, use of antiemetics and/or anti-diarrheals, use of NSAID's, potential chemotherapy side-effects, and any specific recommendations and precautions with the particular chemotherapy agents.      I answered all of the patient's (and family's, if applicable) questions to the best of my ability and to their complete satisfaction. The patient acknowledged full understanding of the risks, recommendations and plan(s).     I have explained and the patient understands all of  the current recommendation(s). I have answered all of their questions to the best of my ability and to their complete satisfaction.         I spent over 25 mins of time with the patient. Reviewed results of the recently ordered labs, tests and studies; made directives with regards to the results. Over half of this time was spent couseling and coordinating care.    I have explained all of the above in detail and the patient understands all of the current recommendation(s). I have answered all of their questions to the best of my ability and to their complete satisfaction.   The patient is to continue with the current management plan.            Electronically signed by Venu Tamez MD                 Answers submitted by the patient for this visit:  Review of Systems Questionnaire (Submitted on 1/20/2023)  appetite change : No  unexpected weight change: No  mouth sores: No  visual disturbance: Yes  cough: No  shortness of breath: No  chest pain: No  abdominal pain: No  diarrhea: No  frequency: Yes  back pain: No  rash: No  headaches: No  adenopathy: No  nervous/  anxious: Yes

## 2023-01-23 ENCOUNTER — DOCUMENTATION ONLY (OUTPATIENT)
Dept: HEMATOLOGY/ONCOLOGY | Facility: CLINIC | Age: 73
End: 2023-01-23

## 2023-01-23 ENCOUNTER — OFFICE VISIT (OUTPATIENT)
Dept: HEMATOLOGY/ONCOLOGY | Facility: CLINIC | Age: 73
End: 2023-01-23
Payer: MEDICARE

## 2023-01-23 ENCOUNTER — INFUSION (OUTPATIENT)
Dept: INFUSION THERAPY | Facility: HOSPITAL | Age: 73
End: 2023-01-23
Attending: INTERNAL MEDICINE
Payer: MEDICARE

## 2023-01-23 VITALS
HEIGHT: 66 IN | RESPIRATION RATE: 18 BRPM | BODY MASS INDEX: 20.69 KG/M2 | SYSTOLIC BLOOD PRESSURE: 135 MMHG | HEART RATE: 95 BPM | TEMPERATURE: 98 F | WEIGHT: 128.75 LBS | DIASTOLIC BLOOD PRESSURE: 78 MMHG

## 2023-01-23 VITALS
WEIGHT: 128.75 LBS | TEMPERATURE: 97 F | RESPIRATION RATE: 18 BRPM | HEIGHT: 66 IN | BODY MASS INDEX: 20.69 KG/M2 | DIASTOLIC BLOOD PRESSURE: 61 MMHG | HEART RATE: 87 BPM | SYSTOLIC BLOOD PRESSURE: 104 MMHG

## 2023-01-23 DIAGNOSIS — C32.9 LARYNX CANCER: ICD-10-CM

## 2023-01-23 DIAGNOSIS — C01 MALIGNANT NEOPLASM OF BASE OF TONGUE: Primary | ICD-10-CM

## 2023-01-23 DIAGNOSIS — C32.9 LARYNGEAL CANCER: ICD-10-CM

## 2023-01-23 DIAGNOSIS — R93.89 ABNORMAL CT SCAN, NECK: ICD-10-CM

## 2023-01-23 DIAGNOSIS — D50.0 IRON DEFICIENCY ANEMIA DUE TO CHRONIC BLOOD LOSS: ICD-10-CM

## 2023-01-23 DIAGNOSIS — Z93.0 TRACHEOSTOMY IN PLACE: ICD-10-CM

## 2023-01-23 DIAGNOSIS — C01 MALIGNANT NEOPLASM OF BASE OF TONGUE: ICD-10-CM

## 2023-01-23 DIAGNOSIS — Z90.02 S/P LARYNGECTOMY: Primary | ICD-10-CM

## 2023-01-23 DIAGNOSIS — E53.8 VITAMIN B12 DEFICIENCY: ICD-10-CM

## 2023-01-23 PROCEDURE — 1125F PR PAIN SEVERITY QUANTIFIED, PAIN PRESENT: ICD-10-PCS | Mod: CPTII,S$GLB,, | Performed by: INTERNAL MEDICINE

## 2023-01-23 PROCEDURE — 96361 HYDRATE IV INFUSION ADD-ON: CPT

## 2023-01-23 PROCEDURE — 1159F PR MEDICATION LIST DOCUMENTED IN MEDICAL RECORD: ICD-10-PCS | Mod: CPTII,S$GLB,, | Performed by: INTERNAL MEDICINE

## 2023-01-23 PROCEDURE — 99214 PR OFFICE/OUTPT VISIT, EST, LEVL IV, 30-39 MIN: ICD-10-PCS | Mod: S$GLB,,, | Performed by: INTERNAL MEDICINE

## 2023-01-23 PROCEDURE — 63600175 PHARM REV CODE 636 W HCPCS: Performed by: INTERNAL MEDICINE

## 2023-01-23 PROCEDURE — 3075F SYST BP GE 130 - 139MM HG: CPT | Mod: CPTII,S$GLB,, | Performed by: INTERNAL MEDICINE

## 2023-01-23 PROCEDURE — 3075F PR MOST RECENT SYSTOLIC BLOOD PRESS GE 130-139MM HG: ICD-10-PCS | Mod: CPTII,S$GLB,, | Performed by: INTERNAL MEDICINE

## 2023-01-23 PROCEDURE — 3078F PR MOST RECENT DIASTOLIC BLOOD PRESSURE < 80 MM HG: ICD-10-PCS | Mod: CPTII,S$GLB,, | Performed by: INTERNAL MEDICINE

## 2023-01-23 PROCEDURE — 96375 TX/PRO/DX INJ NEW DRUG ADDON: CPT

## 2023-01-23 PROCEDURE — 96367 TX/PROPH/DG ADDL SEQ IV INF: CPT

## 2023-01-23 PROCEDURE — 25000003 PHARM REV CODE 250: Performed by: INTERNAL MEDICINE

## 2023-01-23 PROCEDURE — 99214 OFFICE O/P EST MOD 30 MIN: CPT | Mod: S$GLB,,, | Performed by: INTERNAL MEDICINE

## 2023-01-23 PROCEDURE — 96366 THER/PROPH/DIAG IV INF ADDON: CPT

## 2023-01-23 PROCEDURE — 1159F MED LIST DOCD IN RCRD: CPT | Mod: CPTII,S$GLB,, | Performed by: INTERNAL MEDICINE

## 2023-01-23 PROCEDURE — 1160F PR REVIEW ALL MEDS BY PRESCRIBER/CLIN PHARMACIST DOCUMENTED: ICD-10-PCS | Mod: CPTII,S$GLB,, | Performed by: INTERNAL MEDICINE

## 2023-01-23 PROCEDURE — 1125F AMNT PAIN NOTED PAIN PRSNT: CPT | Mod: CPTII,S$GLB,, | Performed by: INTERNAL MEDICINE

## 2023-01-23 PROCEDURE — A4216 STERILE WATER/SALINE, 10 ML: HCPCS | Performed by: INTERNAL MEDICINE

## 2023-01-23 PROCEDURE — 3078F DIAST BP <80 MM HG: CPT | Mod: CPTII,S$GLB,, | Performed by: INTERNAL MEDICINE

## 2023-01-23 PROCEDURE — 1160F RVW MEDS BY RX/DR IN RCRD: CPT | Mod: CPTII,S$GLB,, | Performed by: INTERNAL MEDICINE

## 2023-01-23 PROCEDURE — 3008F BODY MASS INDEX DOCD: CPT | Mod: CPTII,S$GLB,, | Performed by: INTERNAL MEDICINE

## 2023-01-23 PROCEDURE — 3008F PR BODY MASS INDEX (BMI) DOCUMENTED: ICD-10-PCS | Mod: CPTII,S$GLB,, | Performed by: INTERNAL MEDICINE

## 2023-01-23 PROCEDURE — 96413 CHEMO IV INFUSION 1 HR: CPT

## 2023-01-23 RX ORDER — HEPARIN 100 UNIT/ML
500 SYRINGE INTRAVENOUS
Status: DISCONTINUED | OUTPATIENT
Start: 2023-01-23 | End: 2023-01-23 | Stop reason: HOSPADM

## 2023-01-23 RX ORDER — SODIUM CHLORIDE 0.9 % (FLUSH) 0.9 %
10 SYRINGE (ML) INJECTION
Status: CANCELLED | OUTPATIENT
Start: 2023-01-30

## 2023-01-23 RX ORDER — HEPARIN 100 UNIT/ML
500 SYRINGE INTRAVENOUS
Status: CANCELLED | OUTPATIENT
Start: 2023-01-30

## 2023-01-23 RX ORDER — SODIUM CHLORIDE 0.9 % (FLUSH) 0.9 %
10 SYRINGE (ML) INJECTION
Status: DISCONTINUED | OUTPATIENT
Start: 2023-01-23 | End: 2023-01-23 | Stop reason: HOSPADM

## 2023-01-23 RX ADMIN — SODIUM CHLORIDE 1000 ML: 0.9 INJECTION, SOLUTION INTRAVENOUS at 11:01

## 2023-01-23 RX ADMIN — CISPLATIN 66 MG: 1 INJECTION, SOLUTION INTRAVENOUS at 10:01

## 2023-01-23 RX ADMIN — SODIUM CHLORIDE, PRESERVATIVE FREE 10 ML: 5 INJECTION INTRAVENOUS at 01:01

## 2023-01-23 RX ADMIN — HEPARIN 500 UNITS: 100 SYRINGE at 01:01

## 2023-01-23 RX ADMIN — APREPITANT 130 MG: 130 INJECTION, EMULSION INTRAVENOUS at 10:01

## 2023-01-23 RX ADMIN — POTASSIUM CHLORIDE 150 ML/HR: 2 INJECTION, SOLUTION, CONCENTRATE INTRAVENOUS at 07:01

## 2023-01-23 RX ADMIN — PALONOSETRON HYDROCHLORIDE 0.25 MG: 0.25 INJECTION INTRAVENOUS at 10:01

## 2023-01-23 NOTE — PROGRESS NOTES
Medical Nutrition Therapy Oncology Progress Note      Patient's PCP:Maico Patterson MD  Referring Provider: No ref. provider found  Subjective:        Patient ID: Cyrus Batres Jr. is a 72 y.o. male.    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    PEG (percutaneous endoscopic gastrostomy) adjustment/replacement/removal 11/22/2022    Postoperative 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    Tongue cancer     Tracheostomy dependence     Type 2 diabetes mellitus, without long-term current use of insulin 11/22/2022       Past Surgical History:   Procedure Laterality Date    DIRECT LARYNGOBRONCHOSCOPY N/A 12/27/2021    Procedure: LARYNGOSCOPY, DIRECT, WITH BRONCHOSCOPY;  Surgeon: Flex Espinosa MD;  Location: [facility] OR [address] [place] [address];  Service: ENT;  Laterality: N/A;    DIRECT LARYNGOSCOPY  11/9/2022    Procedure: LARYNGOSCOPY, DIRECT;  Surgeon: Jesse James MD;  Location: [facility] OR [address] [place] [address];  Service: ENT;;    DISSECTION OF NECK Bilateral 1/6/2022    Procedure: DISSECTION, NECK;  Surgeon: Jesse James MD;  [facility];  Service: ENT;  Laterality: Bilateral;    DISSECTION OF NECK Bilateral 11/9/2022    Procedure: DISSECTION, NECK;  Surgeon: Jesse James MD;  Location: [facility] OR [address] [place] [address];  Service: ENT;  Laterality: Bilateral;    FLAP PROCEDURE Right 1/6/2022    Procedure: CREATION, FREE FLAP;  Surgeon: Alise Hart MD;  Location: [facility] OR [address] [place] [address];  Service: ENT;  Laterality: Right;  Ischemic start 1351  Ischemic stop 1502    FLAP PROCEDURE Left 11/9/2022     Procedure: CREATION, FREE FLAP, ALT;  Surgeon: Alise Hart MD;  Location: [facility] OR 2ND FLR;  Service: ENT;  Laterality: Left;    GLOSSECTOMY Bilateral 11/9/2022    Procedure: TOTAL GLOSSECTOMY;  Surgeon: Jesse James MD;  Location: [facility] OR 2ND FLR;  Service: ENT;  Laterality: Bilateral;    INSERTION OF TUNNELED CENTRAL VENOUS CATHETER (CVC) WITH SUBCUTANEOUS PORT N/A 6/9/2022    Procedure: [id number];  Surgeon: Jesus Viera MD;  Location: [facility] OR;  Service: General;  Laterality: N/A;    INSERTION OF TUNNELED CENTRAL VENOUS CATHETER (CVC) WITH SUBCUTANEOUS PORT Right 1/12/2023    Procedure: [id number];  Surgeon: Abdulaziz Le Jr., MD;  Location: [facility] OR;  Service: General;  Laterality: Right;    LARYNGECTOMY N/A 1/6/2022    Procedure: LARYNGECTOMY;  Surgeon: Jesse James MD;  [facility];  Service: ENT;  Laterality: N/A;    LARYNGOSCOPY N/A 8/4/2020    Procedure: Suspension microlaryngoscopy with biopsy, possible KTP laser treatment/excision;  Surgeon: Stew Noel MD;  [facility];  Service: ENT;  Laterality: N/A;  Microscope, telescopes, tower, microinstruments, KTP laser, rep conf# [id number] IC 7/28.    LARYNGOSCOPY N/A 3/16/2021    Procedure: Suspension microlaryngoscopy with excision of lesion, possible CO2 laser;  Surgeon: Stew Noel MD;  [facility];  Service: ENT;  Laterality: N/A;  Microscope, telescopes, tower, microinstruments, CO2 laser, rep conf# [id number] IC 3/4.    LARYNGOSCOPY N/A 4/1/2021    Procedure: Suspension microlaryngoscopy with KTP laser excision of lesion;  Surgeon: Stew Noel MD;  [facility];  Service: ENT;  Laterality: N/A;  Microscope, telescopes, tower, microinstruments, 70 degree scope, vocal fold , KTP laser, rep conf# [id number] BC    LARYNGOSCOPY N/A 12/9/2021    Procedure: Suspension microlaryngoscopy with biopsy;  Surgeon: Stew Noel MD;   Location: [facility] OR 2ND FLR;  Service: ENT;  Laterality: N/A;  Microscope, telescopes, tower, microinstruments    LARYNGOSCOPY N/A 1/6/2022    Procedure: LARYNGOSCOPY;  Surgeon: Jesse James MD;  Location: [facility] OR 2ND FLR;  Service: ENT;  Laterality: N/A;    LARYNGOSCOPY N/A 4/27/2022    Procedure: LARYNGOSCOPY WITH BIOPSY;  Surgeon: Jesse James MD;  Location: [facility] OR 2ND FLR;  Service: ENT;  Laterality: N/A;    MICROLARYNGOSCOPY N/A 3/17/2020    Procedure: MICROLARYNGOSCOPY;  Surgeon: Jung Xiao MD;  Location: [facility] OR;  Service: ENT;  Laterality: N/A;  Laser Microlaryngoscopy  NEED TO SCHEDULE LASER from [facility] [id number]    PHARYNGECTOMY  11/9/2022    Procedure: TOTAL PHARYNGECTOMY;  Surgeon: Jesse James MD;  Location: [facility] OR 2ND FLR;  Service: ENT;;    REIMPLANTATION OF PARATHYROID TISSUE N/A 1/6/2022    Procedure: REIMPLANTATION, PARATHYROID TISSUE;  Surgeon: Jesse James MD;  Location: [facility] OR 2ND FLR;  Service: ENT;  Laterality: N/A;    SKIN SPLIT GRAFT Right 11/9/2022    Procedure: APPLICATION, GRAFT, SKIN, SPLIT-THICKNESS;  Surgeon: Jesse James MD;  Location: [facility] OR 2ND FLR;  Service: ENT;  Laterality: Right;    THYROIDECTOMY  1/6/2022    Procedure: THYROIDECTOMY;  Surgeon: Jesse James MD;  Location: [facility] OR [facility] FLR;  Service: ENT;;    TRACHEOSTOMY N/A 12/27/2021    Procedure: CREATION, TRACHEOSTOMY;  Surgeon: Flex Espinosa MD;  Location: [facility] OR 2ND FLR;  Service: ENT;  Laterality: N/A;       Social History     Socioeconomic History    Marital status:      Spouse name: Janel Batres    Number of children: 2   Occupational History    Occupation: [email or web address]     Employer: [employer]   Tobacco Use    Smoking status: Never    Smokeless tobacco: Never   Substance and Sexual Activity    Alcohol use: Not Currently     Comment: occasional    Drug use: No    Sexual activity: Yes     Partners: Female   Social History Narrative    ** Merged  "History Encounter **         2 children from his 1st wife     Social Determinants of Health     Financial Resource Strain: Low Risk     Difficulty of Paying Living Expenses: Not very hard   Food Insecurity: Unknown    Worried About Running Out of Food in the Last Year: Never true   Transportation Needs: No Transportation Needs    Lack of Transportation (Medical): No    Lack of Transportation (Non-Medical): No   Physical Activity: Unknown    Days of Exercise per Week: Patient refused    Minutes of Exercise per Session: Patient refused   Stress: Stress Concern Present    Feeling of Stress : To some extent   Social Connections: Unknown    Frequency of Communication with Friends and Family: Patient refused    Frequency of Social Gatherings with Friends and Family: Patient refused    Attends Mandaen Services: Patient refused    Active Member of Clubs or Organizations: Patient refused    Attends Club or Organization Meetings: Patient refused    Marital Status:    Housing Stability: Low Risk     Unable to Pay for Housing in the Last Year: No    Number of Places Lived in the Last Year: 1    Unstable Housing in the Last Year: No       Family History   Problem Relation Age of Onset    Abnormal EKG Mother     Diabetes Father     Heart disease Father     Hypertension Father        Review of patient's allergies indicates:   Allergen Reactions    Lovastatin Itching    Pollen extracts     Lovastatin Rash     Not confirmed but pt skeptical       Current Outpatient Medications:     acetaminophen (TYLENOL) 325 MG tablet, Take 325 mg by mouth every 6 (six) hours as needed for Pain., Disp: , Rfl:     BD ULTRA-FINE YULISSA PEN NEEDLE 32 gauge x 5/32" Ndle, To be used with Novolog pen up to 4x a day, Disp: 100 each, Rfl: 3    calcitrioL (ROCALTROL) 1 mcg/mL solution, Take 0.25 mLs (0.25 mcg total) by Per G Tube route once daily., Disp: 15 mL, Rfl: 12    calcium carbonate (TUMS) 200 mg calcium (500 mg) chewable tablet, 2 tablets " (1,000 mg total) by Per G Tube route 5 (five) times daily., Disp: 300 tablet, Rfl: 11    calcium carbonate 500 mg/5 mL (1,250 mg/5 mL), 10 mLs (1,000 mg total) by Per G Tube route 3 (three) times daily with meals., Disp: 473 mL, Rfl: 12    doxycycline (VIBRA-TABS) 100 MG tablet, Take 1 tablet (100 mg total) by mouth 2 (two) times daily. for 14 days, Disp: 28 tablet, Rfl: 0    esomeprazole (NEXIUM) 40 MG capsule, 40 mg before breakfast., Disp: , Rfl:     famotidine (PEPCID) 40 mg/5 mL (8 mg/mL) suspension, 2.5 mLs (20 mg total) by Per G Tube route 2 (two) times daily., Disp: 50 mL, Rfl: 11    ibuprofen (ADVIL,MOTRIN) 200 MG tablet, Take 200 mg by mouth every 6 (six) hours as needed for Pain., Disp: , Rfl:     insulin aspart U-100 (NOVOLOG FLEXPEN U-100 INSULIN) 100 unit/mL (3 mL) InPn pen, Inject 0-10 Units into the skin as needed; Add correction scale if needed while on TF.  Blood sugar 180-230 add 2 units, 231-280 +4 units, 281-330 +6 units, 331-380 +8 units, >380 +10 units.  MDD 40 units, Disp: 12 mL, Rfl: 0    levoFLOXacin (LEVAQUIN) 250 mg/10 mL Soln, Take 30 mLs (750 mg total) by mouth once daily., Disp: 480 mL, Rfl: 0    levothyroxine (SYNTHROID) 100 MCG tablet, 1 tablet (100 mcg total) by Per G Tube route before breakfast., Disp: 30 tablet, Rfl: 11    losartan (COZAAR) 100 MG tablet, Take 0.5 tablets (50 mg total) by mouth once daily. (Patient taking differently: Take 50 mg by mouth every evening.), Disp: 45 tablet, Rfl: 3    metFORMIN (GLUCOPHAGE) 500 MG tablet, Take 1 tablet (500 mg total) by mouth 2 (two) times daily with meals., Disp: 60 tablet, Rfl: 3    ondansetron (ZOFRAN) 8 MG tablet, Take 1 tablet (8 mg total) by mouth every 8 (eight) hours as needed for Nausea., Disp: 30 tablet, Rfl: 2    promethazine (PHENERGAN) 25 MG tablet, Take 1 tablet (25 mg total) by mouth every 4 to 6 hours as needed for Nausea., Disp: 30 tablet, Rfl: 2    traZODone (DESYREL) 50 MG tablet, TAKE 1 TABLET BY MOUTH NIGHTLY AS  NEEDED FOR INSOMNIA., Disp: 90 tablet, Rfl: 1    zolpidem (AMBIEN) 5 MG Tab, 1 tablet (5 mg total) by Per G Tube route nightly as needed (difficult with sleep)., Disp: 30 tablet, Rfl: 0  No current facility-administered medications for this visit.    Facility-Administered Medications Ordered in Other Visits:     heparin, porcine (PF) 100 unit/mL injection flush 500 Units, 500 Units, Intravenous, PRN, Venu Tamez MD    sodium chloride 0.9% bolus 1,000 mL 1,000 mL, 1,000 mL, Intravenous, 1 time in Clinic/HOD, Venu Tamez MD, Last Rate: 500 mL/hr at 01/23/23 1145, 1,000 mL at 01/23/23 1145    sodium chloride 0.9% flush 10 mL, 10 mL, Intravenous, PRN, Venu Tamez MD    All medications and past history have been reviewed.    OP HEAD NECK CISPLATIN WEEKLY + RADIOTHERAPY      Treatment Goal:   Curative      Status:   Active      Start Date:   1/23/2023      End Date:   2/21/2023 (Planned)      Provider:   Venu Tamez MD      Chemotherapy:   CISplatin (Platinol) 40 mg/m2 = 66 mg in sodium chloride 0.9% 631 mL chemo infusion, 40 mg/m2 = 66 mg, Intravenous, Clinic/HOD 1 time, 1 of 6 cycles    Administration: 66 mg (1/23/2023)      Objective:      Wt Readings from Last 20 Encounters:   01/23/23 58.4 kg (128 lb 12 oz)   01/23/23 58.4 kg (128 lb 12 oz)   01/18/23 59.4 kg (130 lb 15.3 oz)   01/13/23 59.4 kg (130 lb 14.4 oz)   01/12/23 57.6 kg (127 lb)   01/09/23 57.7 kg (127 lb 3.3 oz)   01/03/23 58.5 kg (128 lb 15.5 oz)   01/03/23 57.7 kg (127 lb 4.8 oz)   12/27/22 58.9 kg (129 lb 12.8 oz)   12/16/22 59.1 kg (130 lb 4.7 oz)   11/25/22 62.1 kg (137 lb)   11/23/22 62.3 kg (137 lb 5.6 oz)   11/23/22 62.2 kg (137 lb 2 oz)   11/16/22 73.3 kg (161 lb 9.6 oz)   11/03/22 65.8 kg (145 lb)   11/02/22 65.3 kg (144 lb)   11/01/22 63 kg (139 lb)   10/26/22 63.9 kg (140 lb 12.8 oz)   10/13/22 63 kg (139 lb)   10/06/22 62.1 kg (137 lb)       Last Labs:  Last Labs:  Glucose   Date Value Ref Range Status    01/13/2023 96 70 - 110 mg/dL Final   01/09/2023 109 70 - 110 mg/dL Final     BUN   Date Value Ref Range Status   01/13/2023 18 8 - 23 mg/dL Final   01/09/2023 19 8 - 23 mg/dL Final     Creatinine   Date Value Ref Range Status   01/13/2023 1.0 0.5 - 1.4 mg/dL Final   01/09/2023 0.8 0.5 - 1.4 mg/dL Final     Sodium   Date Value Ref Range Status   01/13/2023 139 136 - 145 mmol/L Final   01/09/2023 137 136 - 145 mmol/L Final     Potassium   Date Value Ref Range Status   01/13/2023 3.7 3.5 - 5.1 mmol/L Final   01/09/2023 4.1 3.5 - 5.1 mmol/L Final     Phosphorus   Date Value Ref Range Status   11/24/2022 2.4 (L) 2.7 - 4.5 mg/dL Final   11/23/2022 3.6 2.7 - 4.5 mg/dL Final     Calcium   Date Value Ref Range Status   01/13/2023 8.3 (L) 8.7 - 10.5 mg/dL Final   01/09/2023 8.6 (L) 8.7 - 10.5 mg/dL Final     Prealbumin   Date Value Ref Range Status   09/03/2022 19 (L) 20.0 - 43.0 mg/dL Final     Total Protein   Date Value Ref Range Status   01/13/2023 8.1 6.0 - 8.4 g/dL Final   01/09/2023 7.9 6.0 - 8.4 g/dL Final     Cholesterol   Date Value Ref Range Status   02/14/2022 144 120 - 199 mg/dL Final     Comment:     The National Cholesterol Education Program (NCEP) has set the  following guidelines (reference ranges) for Cholesterol:  Optimal.....................<200 mg/dL  Borderline High.............200-239 mg/dL  High........................> or = 240 mg/dL     11/18/2020 174 120 - 199 mg/dL Final     Comment:     The National Cholesterol Education Program (NCEP) has set the  following guidelines (reference ranges) for Cholesterol:  Optimal.....................<200 mg/dL  Borderline High.............200-239 mg/dL  High........................> or = 240 mg/dL       Hemoglobin A1C   Date Value Ref Range Status   11/22/2022 5.8 4.5 - 6.2 % Final     Comment:     According to ADA guidelines, hemoglobin A1C <7.0% represents  optimal control in non-pregnant diabetic patients.  Different  metrics may apply to specific populations.    Standards of Medical Care in Diabetes - 2016.    For the purpose of screening for the presence of diabetes:  <5.7%     Consistent with the absence of diabetes  5.7-6.4%  Consistent with increasing risk for diabetes   (prediabetes)  >or=6.5%  Consistent with diabetes    Currently no consensus exists for use of hemoglobin A1C  for diagnosis of diabetes for children.     11/09/2022 5.7 (H) 4.0 - 5.6 % Final     Comment:     ADA Screening Guidelines:  5.7-6.4%  Consistent with prediabetes  >or=6.5%  Consistent with diabetes    High levels of fetal hemoglobin interfere with the HbA1C  assay. Heterozygous hemoglobin variants (HbS, HgC, etc)do  not significantly interfere with this assay.   However, presence of multiple variants may affect accuracy.       Hemoglobin   Date Value Ref Range Status   01/13/2023 10.8 (L) 14.0 - 18.0 g/dL Final   01/09/2023 10.6 (L) 14.0 - 18.0 g/dL Final     POC Hematocrit   Date Value Ref Range Status   11/09/2022 25 (L) 36 - 54 %PCV Final   11/09/2022 24 (L) 36 - 54 %PCV Final     Hematocrit   Date Value Ref Range Status   01/13/2023 32.8 (L) 40.0 - 54.0 % Final   01/09/2023 32.3 (L) 40.0 - 54.0 % Final     Iron   Date Value Ref Range Status   11/25/2022 30 (L) 45 - 160 ug/dL Final   09/04/2022 58 45 - 160 ug/dL Final     No components found for: FROLATE  Vit D, 25-Hydroxy   Date Value Ref Range Status   02/14/2022 37 30 - 96 ng/mL Final     Comment:     Vitamin D deficiency.........<10 ng/mL                              Vitamin D insufficiency......10-29 ng/mL       Vitamin D sufficiency........> or equal to 30 ng/mL  Vitamin D toxicity............>100 ng/mL     11/18/2020 38 30 - 96 ng/mL Final     Comment:     Vitamin D deficiency.........<10 ng/mL                              Vitamin D insufficiency......10-29 ng/mL       Vitamin D sufficiency........> or equal to 30 ng/mL  Vitamin D toxicity............>100 ng/mL       WBC   Date Value Ref Range Status   01/13/2023 4.47 3.90 - 12.70  K/uL Final   01/09/2023 4.63 3.90 - 12.70 K/uL Final       Assessment:     Nutrition/Diet History     Patient Reported Diet/Restrictions/Preferences: NPO  Food Allergies: NKFA  Factors Affecting Nutritional Intake: s/p laryngectomy, glossectomy    Estimated/Assessed Needs     Weight Used For Calorie Calculations: 62 kg (137 lb)  Energy Calorie Requirements (kcal): 6790-3260 kcal/day   Energy Need Method: 30-35 Kcal/kg  Protein Requirements:  g/day   Protein Need Method: 1.5-2.0 g/kg  Fluid Requirements: 2000 ml/day  Estimated Fluid Requirement Method: 1ml/kcal      Nutrition Support  Current EN: Jevity 1.5- 6 cartons per day with 60ml FWF before and after each bolus. Provides 2130 calories, 90g protein and 1800ml FW. Recommend extra 60ml FWF TID to meet estimated fluid needs.    NEW EN RECOMMENDATIONS: D[employer] 1.4- 5 cartons per day (1625ml total). Bolus 1 carton (325ml) 6x/d with 60ml FWF before and after each bolus. TF and FWF provides 2275 calories, 100g protein and 1770ml FW. Recommend extra 60ml FWF QID to meet estimated fluid needs. Recommended Prostat- 1 oz daily via peg to provide extra 15g protein to meet higher range of protein needs daily.     Evaluation of Received Nutrient/Fluid Intake     Calorie Intake: meeting needs  Protein Intake: meeting needs  Fluid Intake: meeting needs  Tolerance: tolerating  % Intake of Estimated Energy Needs: 100 % via peg      Nutrition Diagnosis Related to (Etiology) As Evidenced By (Signs/Symptoms)   Inadequate protein intake Physiological causes increasing nutrient needs BoT cancer, scheduled surgery, Estimated intake of protein insufficient to meet requirements     RD Notes  Mr. Cyrus Batres is a 71y/o male with 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. Pt is schedule for glossectomy surgery for his base of tongue cancer in 1 month. He is currently NPO and recently switch to Jevity 1.5 and is tolerating 6 cartons per day  without associated N/V/D, gas or bloating. He does have chronic diarrhea not associated with feeding times. He present today with questions regarding how he can increase his nutrition to prepare for surgery next month. He reports he would like to switch to [employer] to promote regular BMs and help control BG. He reports recently weight gain of 7-8# since increasing his TF to 6 cartons per day. CW: 137#    1/23/22: RD met with Mr Medardo myrick today for nutrition follow up. Pt will be starting new treatment with cisplatin and concurrent XRT s/p extensive resection last month. Pt denies having any current problems with peg feedings. He has good knowledge of nutrition related side effects of treatment. Denies N/V/D/C. BMs normal. CW: 128#    Nutrition Intervention:      Nutrition Intervention Enteral Nutrition  Composition   Goals/Expected Outcomes Continue D[employer] 1.4- 5 cartons per day to meet estimated nutritional needs and promote regular BMs   Progress Progressing towards goal     Nutrition Intervention Medical food supplement: Modified food   Goals/Expected Outcomes Initiate Prostat 1oz- once daily to meet higher end of estimated protein range to prepare for upcoming surgery and promote healing after surgery   Progress Progressing towards goal     Plan  Continue EN at goal rate  Provided RD contact info. Encouraged pt to contact RD with questions or concerns    Monitoring/Evaluation:     Monitor: TF tolerance, weight, BMs    Next Visit: f/u weekly or as needed      I have explained and the patient understands all of  the current recommendation(s). I have answered all of their questions to the best of my ability and to their complete satisfaction.   The patient is to continue with the current management plan.    Electronically signed by: Barbara Zayas MBA, JAMIRN, MALLORY

## 2023-01-23 NOTE — PLAN OF CARE
Problem: Fatigue  Goal: Improved Activity Tolerance  1/23/2023 0754 by Elisabeth Rush RN  Outcome: Met  1/23/2023 0754 by Elisabeth Rush RN  Outcome: Ongoing, Progressing

## 2023-01-25 ENCOUNTER — TELEPHONE (OUTPATIENT)
Dept: SPEECH THERAPY | Facility: HOSPITAL | Age: 73
End: 2023-01-25
Payer: MEDICARE

## 2023-01-25 ENCOUNTER — OUTPATIENT CASE MANAGEMENT (OUTPATIENT)
Dept: ADMINISTRATIVE | Facility: OTHER | Age: 73
End: 2023-01-25
Payer: MEDICARE

## 2023-01-25 NOTE — PROGRESS NOTES
Outpatient Care Management  Plan of Care Follow Up Visit    Patient: Cyrus Batres Jr.  MRN: [id number]  Date of Service: 01/25/2023  Completed by: Nadine Louise RN  Referral Date: 09/02/2022    Reason for Visit   Patient presents with    OPCM Chart Review     1/25/23    Update Plan Of Care     1/25/23       Brief Summary:    completed his first chemotherapy and radiation therapy on 1/23/23   is tolerating 5 cartons of jevity tube feed per day  Mr. Batres is tolerating small amount of water orally  Mrs. Batres inquired about available smart phone communication apps      Next Steps:   Follow up in about 2 weeks around 2/8/23  Send in basket message to Natalia Oh CCC-SLP requesting recommendations R/T to smart phone communication apps  Confirm attendance at chemotherapy appointment on 1/30/23 at 7AM

## 2023-01-25 NOTE — TELEPHONE ENCOUNTER
Spoke with Janel, gave her information on text to talk apps.  Also, swallow suggestions for liquids.  She relayed appreciation and understanding of all recs.

## 2023-01-26 ENCOUNTER — PATIENT MESSAGE (OUTPATIENT)
Dept: HEMATOLOGY/ONCOLOGY | Facility: CLINIC | Age: 73
End: 2023-01-26
Payer: MEDICARE

## 2023-01-27 ENCOUNTER — OFFICE VISIT (OUTPATIENT)
Dept: URGENT CARE | Facility: CLINIC | Age: 73
End: 2023-01-27
Payer: MEDICARE

## 2023-01-27 ENCOUNTER — LAB VISIT (OUTPATIENT)
Dept: LAB | Facility: HOSPITAL | Age: 73
End: 2023-01-27
Attending: NURSE PRACTITIONER
Payer: MEDICARE

## 2023-01-27 ENCOUNTER — TELEPHONE (OUTPATIENT)
Dept: HEMATOLOGY/ONCOLOGY | Facility: CLINIC | Age: 73
End: 2023-01-27

## 2023-01-27 VITALS
BODY MASS INDEX: 20.73 KG/M2 | WEIGHT: 129 LBS | HEART RATE: 82 BPM | SYSTOLIC BLOOD PRESSURE: 99 MMHG | OXYGEN SATURATION: 100 % | DIASTOLIC BLOOD PRESSURE: 62 MMHG | RESPIRATION RATE: 16 BRPM | HEIGHT: 66 IN

## 2023-01-27 DIAGNOSIS — Z20.822 ENCOUNTER FOR LABORATORY TESTING FOR COVID-19 VIRUS: Primary | ICD-10-CM

## 2023-01-27 DIAGNOSIS — C01 MALIGNANT NEOPLASM OF BASE OF TONGUE: ICD-10-CM

## 2023-01-27 DIAGNOSIS — Z20.822 COVID-19 VIRUS NOT DETECTED: ICD-10-CM

## 2023-01-27 DIAGNOSIS — C01 MALIGNANT NEOPLASM OF BASE OF TONGUE: Primary | ICD-10-CM

## 2023-01-27 DIAGNOSIS — Z90.02 S/P LARYNGECTOMY: ICD-10-CM

## 2023-01-27 LAB
ALBUMIN SERPL BCP-MCNC: 4 G/DL (ref 3.5–5.2)
ALP SERPL-CCNC: 376 U/L (ref 55–135)
ALT SERPL W/O P-5'-P-CCNC: 133 U/L (ref 10–44)
ANION GAP SERPL CALC-SCNC: 10 MMOL/L (ref 8–16)
AST SERPL-CCNC: 137 U/L (ref 10–40)
BASOPHILS # BLD AUTO: 0.02 K/UL (ref 0–0.2)
BASOPHILS NFR BLD: 0.5 % (ref 0–1.9)
BILIRUB SERPL-MCNC: 1.2 MG/DL (ref 0.1–1)
BUN SERPL-MCNC: 24 MG/DL (ref 8–23)
CALCIUM SERPL-MCNC: 6.5 MG/DL (ref 8.7–10.5)
CHLORIDE SERPL-SCNC: 98 MMOL/L (ref 95–110)
CO2 SERPL-SCNC: 29 MMOL/L (ref 23–29)
CREAT SERPL-MCNC: 0.9 MG/DL (ref 0.5–1.4)
CTP QC/QA: YES
DIFFERENTIAL METHOD: ABNORMAL
EOSINOPHIL # BLD AUTO: 0.1 K/UL (ref 0–0.5)
EOSINOPHIL NFR BLD: 1.9 % (ref 0–8)
ERYTHROCYTE [DISTWIDTH] IN BLOOD BY AUTOMATED COUNT: 14.6 % (ref 11.5–14.5)
EST. GFR  (NO RACE VARIABLE): >60 ML/MIN/1.73 M^2
GLUCOSE SERPL-MCNC: 87 MG/DL (ref 70–110)
HCT VFR BLD AUTO: 30.7 % (ref 40–54)
HGB BLD-MCNC: 10.3 G/DL (ref 14–18)
IMM GRANULOCYTES # BLD AUTO: 0.04 K/UL (ref 0–0.04)
IMM GRANULOCYTES NFR BLD AUTO: 0.9 % (ref 0–0.5)
LYMPHOCYTES # BLD AUTO: 0.6 K/UL (ref 1–4.8)
LYMPHOCYTES NFR BLD: 15 % (ref 18–48)
MCH RBC QN AUTO: 31 PG (ref 27–31)
MCHC RBC AUTO-ENTMCNC: 33.6 G/DL (ref 32–36)
MCV RBC AUTO: 93 FL (ref 82–98)
MONOCYTES # BLD AUTO: 0.4 K/UL (ref 0.3–1)
MONOCYTES NFR BLD: 8.6 % (ref 4–15)
NEUTROPHILS # BLD AUTO: 3.1 K/UL (ref 1.8–7.7)
NEUTROPHILS NFR BLD: 73.1 % (ref 38–73)
NRBC BLD-RTO: 0 /100 WBC
PLATELET # BLD AUTO: 180 K/UL (ref 150–450)
PMV BLD AUTO: 9.9 FL (ref 9.2–12.9)
POTASSIUM SERPL-SCNC: 3.9 MMOL/L (ref 3.5–5.1)
PROT SERPL-MCNC: 6.7 G/DL (ref 6–8.4)
RBC # BLD AUTO: 3.32 M/UL (ref 4.6–6.2)
SARS-COV-2 AG RESP QL IA.RAPID: NEGATIVE
SODIUM SERPL-SCNC: 137 MMOL/L (ref 136–145)
WBC # BLD AUTO: 4.28 K/UL (ref 3.9–12.7)

## 2023-01-27 PROCEDURE — 85025 COMPLETE CBC W/AUTO DIFF WBC: CPT | Performed by: NURSE PRACTITIONER

## 2023-01-27 PROCEDURE — 1159F MED LIST DOCD IN RCRD: CPT | Mod: CPTII,S$GLB,, | Performed by: NURSE PRACTITIONER

## 2023-01-27 PROCEDURE — 3074F SYST BP LT 130 MM HG: CPT | Mod: CPTII,S$GLB,, | Performed by: NURSE PRACTITIONER

## 2023-01-27 PROCEDURE — 3074F PR MOST RECENT SYSTOLIC BLOOD PRESSURE < 130 MM HG: ICD-10-PCS | Mod: CPTII,S$GLB,, | Performed by: NURSE PRACTITIONER

## 2023-01-27 PROCEDURE — 87811 SARS CORONAVIRUS 2 ANTIGEN POCT, MANUAL READ: ICD-10-PCS | Mod: QW,S$GLB,, | Performed by: NURSE PRACTITIONER

## 2023-01-27 PROCEDURE — 3008F PR BODY MASS INDEX (BMI) DOCUMENTED: ICD-10-PCS | Mod: CPTII,S$GLB,, | Performed by: NURSE PRACTITIONER

## 2023-01-27 PROCEDURE — 99203 PR OFFICE/OUTPT VISIT, NEW, LEVL III, 30-44 MIN: ICD-10-PCS | Mod: S$GLB,,, | Performed by: NURSE PRACTITIONER

## 2023-01-27 PROCEDURE — 36415 COLL VENOUS BLD VENIPUNCTURE: CPT | Performed by: NURSE PRACTITIONER

## 2023-01-27 PROCEDURE — 1159F PR MEDICATION LIST DOCUMENTED IN MEDICAL RECORD: ICD-10-PCS | Mod: CPTII,S$GLB,, | Performed by: NURSE PRACTITIONER

## 2023-01-27 PROCEDURE — 3008F BODY MASS INDEX DOCD: CPT | Mod: CPTII,S$GLB,, | Performed by: NURSE PRACTITIONER

## 2023-01-27 PROCEDURE — 3078F DIAST BP <80 MM HG: CPT | Mod: CPTII,S$GLB,, | Performed by: NURSE PRACTITIONER

## 2023-01-27 PROCEDURE — 99203 OFFICE O/P NEW LOW 30 MIN: CPT | Mod: S$GLB,,, | Performed by: NURSE PRACTITIONER

## 2023-01-27 PROCEDURE — 87811 SARS-COV-2 COVID19 W/OPTIC: CPT | Mod: QW,S$GLB,, | Performed by: NURSE PRACTITIONER

## 2023-01-27 PROCEDURE — 3078F PR MOST RECENT DIASTOLIC BLOOD PRESSURE < 80 MM HG: ICD-10-PCS | Mod: CPTII,S$GLB,, | Performed by: NURSE PRACTITIONER

## 2023-01-27 PROCEDURE — 80053 COMPREHEN METABOLIC PANEL: CPT | Performed by: NURSE PRACTITIONER

## 2023-01-27 RX ORDER — LACTOSE-REDUCED FOOD/FIBER
LIQUID (ML) ORAL
Qty: 180 EACH | Refills: 11
Start: 2023-01-27 | End: 2023-01-27

## 2023-01-27 RX ORDER — LACTOSE-REDUCED FOOD/FIBER 0.06 G-1.5
LIQUID (ML) ORAL
Qty: 180 EACH | Refills: 11
Start: 2023-01-27

## 2023-01-27 NOTE — PROGRESS NOTES
"Subjective:       Patient ID: Cyrus Batres Jr. is a 72 y.o. male.    Vitals:  height is 5' 6" (1.676 m) and weight is 58.5 kg (129 lb). His blood pressure is 99/62 and his pulse is 82. His respiration is 16 and oxygen saturation is 100%.     Chief Complaint: COVID-19 Concerns    Pt states "Wife has covid and he wants to be tested; pt denies any symptoms."      Constitution: Negative.   HENT: Negative.     Neck: neck negative.   Cardiovascular: Negative.    Eyes: Negative.    Respiratory: Negative.     Gastrointestinal: Negative.    Endocrine: negative.   Genitourinary: Negative.    Musculoskeletal: Negative.    Skin: Negative.    Allergic/Immunologic: Negative.    Neurological: Negative.    Hematologic/Lymphatic: Negative.    Psychiatric/Behavioral: Negative.       Objective:      Physical Exam   Constitutional: He is oriented to person, place, and time. He appears well-developed. He is cooperative.  Non-toxic appearance. He does not appear ill. No distress.   HENT:   Head: Normocephalic and atraumatic.   Ears:   Right Ear: External ear normal.   Left Ear: External ear normal.   Nose: Nose normal.   Mouth/Throat: Oropharynx is clear and moist and mucous membranes are normal. Mucous membranes are moist. Oropharynx is clear.   Eyes: Conjunctivae and lids are normal.   Neck: Trachea normal and phonation normal. Neck supple.   Cardiovascular: Normal rate, regular rhythm, normal heart sounds and normal pulses.   Pulmonary/Chest: Effort normal and breath sounds normal. No stridor. No respiratory distress.         Comments: Tracheostomy tube in place, clean and clear.    Abdominal: Normal appearance.   Musculoskeletal:         General: No tenderness, deformity or signs of injury.   Lymphadenopathy:     He has no cervical adenopathy.   Neurological: He is alert and oriented to person, place, and time. He has normal strength and normal reflexes. No sensory deficit.   Skin: Skin is warm, dry, intact and not diaphoretic. " Capillary refill takes 2 to 3 seconds.   Psychiatric: His speech is normal and behavior is normal. Judgment and thought content normal.   Nursing note and vitals reviewed.      Assessment:       1. Encounter for laboratory testing for COVID-19 virus    2. COVID-19 virus not detected          Plan:         Encounter for laboratory testing for COVID-19 virus  -     SARS Coronavirus 2 Antigen, POCT Manual Read    COVID-19 virus not detected            I have discussed the test results and physical exam findings with the patient. We discussed the need to continue to monitor for symptoms and return to clinic or follow up for the development of any new symptoms. She verbalized understanding and agreement.

## 2023-01-27 NOTE — TELEPHONE ENCOUNTER
Annamaria, sister in law, called to say that patient spouse tested positive for covid today, patient has appt with rad/onc today and they are not sure what to do. Per Briana's verbal orders, I informed rad/onc of this and requested that they call sister in law to give instructions, informed sister in law that patient and spouse should isolate, wash hands well and that patient will need to go to ER at any signs/symptoms of covid. Verbalized understanding.

## 2023-01-30 ENCOUNTER — DOCUMENTATION ONLY (OUTPATIENT)
Dept: HEMATOLOGY/ONCOLOGY | Facility: CLINIC | Age: 73
End: 2023-01-30

## 2023-01-30 ENCOUNTER — OFFICE VISIT (OUTPATIENT)
Dept: HEMATOLOGY/ONCOLOGY | Facility: CLINIC | Age: 73
End: 2023-01-30
Payer: MEDICARE

## 2023-01-30 ENCOUNTER — TELEPHONE (OUTPATIENT)
Dept: HEMATOLOGY/ONCOLOGY | Facility: CLINIC | Age: 73
End: 2023-01-30

## 2023-01-30 ENCOUNTER — INFUSION (OUTPATIENT)
Dept: INFUSION THERAPY | Facility: HOSPITAL | Age: 73
End: 2023-01-30
Attending: INTERNAL MEDICINE
Payer: MEDICARE

## 2023-01-30 VITALS
HEART RATE: 73 BPM | TEMPERATURE: 98 F | SYSTOLIC BLOOD PRESSURE: 124 MMHG | RESPIRATION RATE: 18 BRPM | HEIGHT: 66 IN | DIASTOLIC BLOOD PRESSURE: 66 MMHG | WEIGHT: 124.44 LBS | BODY MASS INDEX: 20 KG/M2

## 2023-01-30 VITALS
DIASTOLIC BLOOD PRESSURE: 67 MMHG | HEIGHT: 66 IN | TEMPERATURE: 98 F | BODY MASS INDEX: 20 KG/M2 | SYSTOLIC BLOOD PRESSURE: 107 MMHG | WEIGHT: 124.44 LBS | RESPIRATION RATE: 18 BRPM | HEART RATE: 81 BPM

## 2023-01-30 DIAGNOSIS — C01 MALIGNANT NEOPLASM OF BASE OF TONGUE: Primary | ICD-10-CM

## 2023-01-30 DIAGNOSIS — R74.01 TRANSAMINITIS: Primary | ICD-10-CM

## 2023-01-30 DIAGNOSIS — E83.51 HYPOCALCEMIA: ICD-10-CM

## 2023-01-30 DIAGNOSIS — C32.9 LARYNX CANCER: ICD-10-CM

## 2023-01-30 DIAGNOSIS — E83.51 HYPOCALCEMIA: Primary | ICD-10-CM

## 2023-01-30 DIAGNOSIS — E86.0 DEHYDRATION: ICD-10-CM

## 2023-01-30 DIAGNOSIS — C32.9 LARYNGEAL CANCER: ICD-10-CM

## 2023-01-30 LAB
ALBUMIN SERPL BCP-MCNC: 3.7 G/DL (ref 3.5–5.2)
ALP SERPL-CCNC: 401 U/L (ref 55–135)
ALT SERPL W/O P-5'-P-CCNC: 148 U/L (ref 10–44)
ANION GAP SERPL CALC-SCNC: 9 MMOL/L (ref 8–16)
AST SERPL-CCNC: 110 U/L (ref 10–40)
BILIRUB SERPL-MCNC: 1 MG/DL (ref 0.1–1)
BUN SERPL-MCNC: 21 MG/DL (ref 8–23)
CALCIUM SERPL-MCNC: 6.4 MG/DL (ref 8.7–10.5)
CHLORIDE SERPL-SCNC: 104 MMOL/L (ref 95–110)
CO2 SERPL-SCNC: 24 MMOL/L (ref 23–29)
CREAT SERPL-MCNC: 0.7 MG/DL (ref 0.5–1.4)
EST. GFR  (NO RACE VARIABLE): >60 ML/MIN/1.73 M^2
GLUCOSE SERPL-MCNC: 107 MG/DL (ref 70–110)
POTASSIUM SERPL-SCNC: 4.1 MMOL/L (ref 3.5–5.1)
PROT SERPL-MCNC: 6.5 G/DL (ref 6–8.4)
SODIUM SERPL-SCNC: 137 MMOL/L (ref 136–145)

## 2023-01-30 PROCEDURE — 3008F PR BODY MASS INDEX (BMI) DOCUMENTED: ICD-10-PCS | Mod: CPTII,S$GLB,, | Performed by: NURSE PRACTITIONER

## 2023-01-30 PROCEDURE — 3008F BODY MASS INDEX DOCD: CPT | Mod: CPTII,S$GLB,, | Performed by: NURSE PRACTITIONER

## 2023-01-30 PROCEDURE — 96365 THER/PROPH/DIAG IV INF INIT: CPT

## 2023-01-30 PROCEDURE — A4216 STERILE WATER/SALINE, 10 ML: HCPCS | Performed by: INTERNAL MEDICINE

## 2023-01-30 PROCEDURE — 63600175 PHARM REV CODE 636 W HCPCS: Performed by: NURSE PRACTITIONER

## 2023-01-30 PROCEDURE — 3078F PR MOST RECENT DIASTOLIC BLOOD PRESSURE < 80 MM HG: ICD-10-PCS | Mod: CPTII,S$GLB,, | Performed by: NURSE PRACTITIONER

## 2023-01-30 PROCEDURE — 96366 THER/PROPH/DIAG IV INF ADDON: CPT

## 2023-01-30 PROCEDURE — 99215 PR OFFICE/OUTPT VISIT, EST, LEVL V, 40-54 MIN: ICD-10-PCS | Mod: S$GLB,,, | Performed by: NURSE PRACTITIONER

## 2023-01-30 PROCEDURE — 99215 OFFICE O/P EST HI 40 MIN: CPT | Mod: S$GLB,,, | Performed by: NURSE PRACTITIONER

## 2023-01-30 PROCEDURE — 3074F SYST BP LT 130 MM HG: CPT | Mod: CPTII,S$GLB,, | Performed by: NURSE PRACTITIONER

## 2023-01-30 PROCEDURE — 1126F PR PAIN SEVERITY QUANTIFIED, NO PAIN PRESENT: ICD-10-PCS | Mod: CPTII,S$GLB,, | Performed by: NURSE PRACTITIONER

## 2023-01-30 PROCEDURE — 25000003 PHARM REV CODE 250: Performed by: INTERNAL MEDICINE

## 2023-01-30 PROCEDURE — 3074F PR MOST RECENT SYSTOLIC BLOOD PRESSURE < 130 MM HG: ICD-10-PCS | Mod: CPTII,S$GLB,, | Performed by: NURSE PRACTITIONER

## 2023-01-30 PROCEDURE — 3078F DIAST BP <80 MM HG: CPT | Mod: CPTII,S$GLB,, | Performed by: NURSE PRACTITIONER

## 2023-01-30 PROCEDURE — 63600175 PHARM REV CODE 636 W HCPCS: Performed by: INTERNAL MEDICINE

## 2023-01-30 PROCEDURE — 80053 COMPREHEN METABOLIC PANEL: CPT | Performed by: NURSE PRACTITIONER

## 2023-01-30 PROCEDURE — 96367 TX/PROPH/DG ADDL SEQ IV INF: CPT

## 2023-01-30 PROCEDURE — 1126F AMNT PAIN NOTED NONE PRSNT: CPT | Mod: CPTII,S$GLB,, | Performed by: NURSE PRACTITIONER

## 2023-01-30 PROCEDURE — 25000003 PHARM REV CODE 250: Performed by: NURSE PRACTITIONER

## 2023-01-30 RX ORDER — CALCIUM CARBONATE 1250 MG/5ML
1000 SUSPENSION ORAL
Qty: 473 ML | Refills: 12 | Status: SHIPPED | OUTPATIENT
Start: 2023-01-30 | End: 2023-02-10

## 2023-01-30 RX ORDER — SODIUM CHLORIDE 0.9 % (FLUSH) 0.9 %
10 SYRINGE (ML) INJECTION
Status: DISCONTINUED | OUTPATIENT
Start: 2023-01-30 | End: 2023-01-30 | Stop reason: HOSPADM

## 2023-01-30 RX ORDER — HEPARIN 100 UNIT/ML
500 SYRINGE INTRAVENOUS
Status: DISCONTINUED | OUTPATIENT
Start: 2023-01-30 | End: 2023-01-30 | Stop reason: HOSPADM

## 2023-01-30 RX ADMIN — POTASSIUM CHLORIDE 150 ML/HR: 2 INJECTION, SOLUTION, CONCENTRATE INTRAVENOUS at 07:01

## 2023-01-30 RX ADMIN — HEPARIN 500 UNITS: 100 SYRINGE at 01:01

## 2023-01-30 RX ADMIN — CALCIUM GLUCONATE: 98 INJECTION, SOLUTION INTRAVENOUS at 11:01

## 2023-01-30 RX ADMIN — SODIUM CHLORIDE, PRESERVATIVE FREE 10 ML: 5 INJECTION INTRAVENOUS at 01:01

## 2023-01-30 NOTE — PROGRESS NOTES
[facility] Hematology/Oncology  PROGRESS NOTE -  Follow-up Visit      Subjective:       Patient ID:   NAME: Cyrsu Batres Jr. : 1950     72 y.o. 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  The patient is here today to go over the results of the recently ordered labs, tests and studies.     He is here today for cycle 2 of chemotherapy today. 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 He had his portacath removed on  and replaced on 2023 by Dr Le    He is breathing ok. No HA's or CP; no pain at this time  He did have a critical calcium level over the weekend, we will repeat and provide supplementation today.         Discussed covid19 and he has been vaccinated      ROS:   GEN: normal without any fever, night sweats or weight loss; he has gained some weight  HEENT: normal with no HA's, sore throat, stiff neck, changes in vision  CV: normal with no CP, SOB, PND, GRIFFIN or orthopnea  PULM: normal with no SOB, cough, hemoptysis, sputum or pleuritic pain  GI: chronic N/V with the chemotherapy; has peg tube;   : normal with no hematuria, dysuria  BREAST: normal with no mass, discharge, pain  SKIN: normal with no rash, erythema, bruising, or swelling     Pain Scale:  0    Allergies:  Review of patient's allergies indicates:  "  Allergen Reactions    Lovastatin Itching    Pollen extracts     Lovastatin Rash     Not confirmed but pt skeptical       Medications:    Current Outpatient Medications:     acetaminophen (TYLENOL) 325 MG tablet, Take 325 mg by mouth every 6 (six) hours as needed for Pain., Disp: , Rfl:     BD ULTRA-FINE YULISSA PEN NEEDLE 32 gauge x 5/32" Ndle, To be used with Novolog pen up to 4x a day, Disp: 100 each, Rfl: 3    calcitrioL (ROCALTROL) 1 mcg/mL solution, Take 0.25 mLs (0.25 mcg total) by Per G Tube route once daily., Disp: 15 mL, Rfl: 12    calcium carbonate (TUMS) 200 mg calcium (500 mg) chewable tablet, 2 tablets (1,000 mg total) by Per G Tube route 5 (five) times daily., Disp: 300 tablet, Rfl: 11    calcium carbonate 500 mg/5 mL (1,250 mg/5 mL), Take 10 mLs (1,000 mg total) by Per G Tube route 3 (three) times daily with meals., Disp: 473 mL, Rfl: 12    esomeprazole (NEXIUM) 40 MG capsule, 40 mg before breakfast., Disp: , Rfl:     famotidine (PEPCID) 40 mg/5 mL (8 mg/mL) suspension, 2.5 mLs (20 mg total) by Per G Tube route 2 (two) times daily., Disp: 50 mL, Rfl: 11    ibuprofen (ADVIL,MOTRIN) 200 MG tablet, Take 200 mg by mouth every 6 (six) hours as needed for Pain., Disp: , Rfl:     insulin aspart U-100 (NOVOLOG FLEXPEN U-100 INSULIN) 100 unit/mL (3 mL) InPn pen, Inject 0-10 Units into the skin as needed; Add correction scale if needed while on TF.  Blood sugar 180-230 add 2 units, 231-280 +4 units, 281-330 +6 units, 331-380 +8 units, >380 +10 units.  MDD 40 units, Disp: 12 mL, Rfl: 0    lactose-reduced food with fibr (JEVITY 1.5 TRISHA) 0.06 gram-1.5 kcal/mL Liqd, 6 cartons per day via peg.  Flush 60ml before and after each bolus, Disp: 180 each, Rfl: 11    levoFLOXacin (LEVAQUIN) 250 mg/10 mL Soln, Take 30 mLs (750 mg total) by mouth once daily., Disp: 480 mL, Rfl: 0    levothyroxine (SYNTHROID) 100 MCG tablet, 1 tablet (100 mcg total) by Per G Tube route before breakfast., Disp: 30 tablet, Rfl: 11    losartan " (COZAAR) 100 MG tablet, Take 0.5 tablets (50 mg total) by mouth once daily. (Patient taking differently: Take 50 mg by mouth every evening.), Disp: 45 tablet, Rfl: 3    metFORMIN (GLUCOPHAGE) 500 MG tablet, Take 1 tablet (500 mg total) by mouth 2 (two) times daily with meals., Disp: 60 tablet, Rfl: 3    ondansetron (ZOFRAN) 8 MG tablet, Take 1 tablet (8 mg total) by mouth every 8 (eight) hours as needed for Nausea., Disp: 30 tablet, Rfl: 2    promethazine (PHENERGAN) 25 MG tablet, Take 1 tablet (25 mg total) by mouth every 4 to 6 hours as needed for Nausea., Disp: 30 tablet, Rfl: 2    traZODone (DESYREL) 50 MG tablet, TAKE 1 TABLET BY MOUTH NIGHTLY AS NEEDED FOR INSOMNIA., Disp: 90 tablet, Rfl: 1    zolpidem (AMBIEN) 5 MG Tab, 1 tablet (5 mg total) by Per G Tube route nightly as needed (difficult with sleep)., Disp: 30 tablet, Rfl: 0  No current facility-administered medications for this visit.    Facility-Administered Medications Ordered in Other Visits:     aprepitant (CINVANTI) injection 130 mg, 130 mg, Intravenous, 1 time in Clinic/HOD, Venu Tamez MD    calcium gluconate 2 g in dextrose 5 % (D5W) 100 mL, , Intravenous, 1 time in Clinic/HOD, Briana Martinez NP, Last Rate: 50 mL/hr at 01/30/23 1102, New Bag at 01/30/23 1102    CISplatin (Platinol) 40 mg/m2 = 66 mg in sodium chloride 0.9% 631 mL chemo infusion, 40 mg/m2 (Treatment Plan Recorded), Intravenous, 1 time in Clinic/HOD, Venu Tamez MD    heparin, porcine (PF) 100 unit/mL injection flush 500 Units, 500 Units, Intravenous, PRN, Venu Tamez MD    palonosetron 0.25mg/dexAMETHasone 12mg in NS IVPB 0.25 mg 50 mL, 0.25 mg, Intravenous, 1 time in Clinic/HOD, Venu Tamez MD    sodium chloride 0.9% bolus 1,000 mL 1,000 mL, 1,000 mL, Intravenous, 1 time in Clinic/HOD, Venu Tamez MD    sodium chloride 0.9% flush 10 mL, 10 mL, Intravenous, PRN, Venu Tamez MD    PMHx/PSHx Updates:  See patient's last visit with   "Joi on 1/23/2023  See H&P on 9/23/2022        Pathology:   Cancer Staging   Larynx cancer  Staging form: Larynx - Glottis, AJCC 8th Edition  - Clinical stage from 8/6/2020: Stage II (cT2, cN0, cM0) - Signed by Fariha Muñoz NP on 8/6/2020  - Pathologic stage from 1/20/2022: Stage LOU (pT4a, pN0, cM0) - Signed by Fariha Muñoz NP on 1/20/2022  Staging form: Pharynx - P16 Negative Oropharynx, AJCC 8th Edition  - Clinical: Stage II (rcT2, cN0, cM0) - Signed by Matheus Portillo Jr., MD on 5/30/2022    Malignant neoplasm of base of tongue  Staging form: Pharynx - P16 Negative Oropharynx, AJCC 8th Edition  - Clinical stage from 5/20/2022: Stage II (cT2, cN0, cM0, p16-) - Signed by Saúl Kessler MD on 7/7/2022    Dissection 11/9/2022:    Final Pathologic Diagnosis 1. "left submandibular lymph node", dissection:       - Three lymph nodes, negative for carcinoma (0/3)   2. 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       - Tumor size: 4.5 cm       - Resection margins: left superior pharyngeal margin focally involved;   all other margins are negative for carcinoma       - Left internal jugular vein: free of tumor       - One lymph node, negative for carcinoma (0/1)   Note: P16 immunostain is negative     Tumor Site       Oropharynx  involving Base of tongue       Tumor Laterality       Midline       Tumor Size       Greatest Dimension (Centimeters) 4.5 cm         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)       Histologic Grade       G2 to G3: Moderate to Poorly differentiated       Lymphovascular Invasion       Not identified       Perineural Invasion       Not identified     MARGINS      Margins       Involved by invasive tumor     Margin(s)   Involved by Invasive Tumor:      left superior pharyngeal margin; all other   margins are free of tumor     LYMPH NODES    Regional Lymph Node " Status:    :     Regional Lymph Node   Status:      All regional lymph nodes negative for tumor      pT Category:               pT3   pN Category:               pN0      Base of Tongue Biopsy  4/27/2022:  Final Pathologic Diagnosis BIOPSY OF BASE OF THE TONGUE:   THE INFILTRATING POORLY DIFFERENTIATED SQUAMOUS CELL CARCINOMA   THE TUMOR IS P16 NEGATIVE.  THE POSITIVE AND NEGATIVE CONTROLS STAINED   APPROPRIATELY      Larynx resection/thyroidectomy 1/6/2022:     Final Pathologic Diagnosis 1. Lymph nodes, left neck levels 2,  3 and 4, dissection:   - Nineteen lymph nodes, all  negative  for metastatic carcinoma (0/19)   2. Lymph nodes, right neck levels 2,  3 and 4, dissection:   - Twenty-eight lymph nodes, all  negative  for metastatic carcinoma (0/28)   3. Possible parathyroid gland, excision:   - Benign parathyroid gland tissue (0.003 g)   4. 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 Soft tissue, posterior tracheal margin, excision:   - Benign, focally reactive respiratory mucosa with submucosal acute   inflammation,  negative  for dysplasia or malignancy   6. Soft tissue, anterior tracheal margin, excision:   - Benign respiratory mucosa with subepithelial cartilage,  negative  for   dysplasia or malignancy   7. Soft tissue, posterior tracheal margin #2, excision:   - Benign, focally reactive respiratory mucosa with submucosal acute   inflammation,  negative  for dysplasia or malignancy   8. Soft tissue, anterior tracheal margin #2, excision:   -  "Benign, reactive focally ulcerated respiratory mucosa with submucosal acute   inflammation,  negative  for dysplasia or malignancy             Laryngoscope 8/4/2020: (with Dr Noel):  Final Pathologic Diagnosis 1.  Left true vocal fold, biopsy:       -  Invasive moderately differentiated squamous cell carcinoma,   keratinizing type   2.  Left true vocal fold, biopsy:       -  Invasive moderately differentiated squamous cell carcinoma,   keratinizing type             Laryngoscope 3/17/2020 (with Dr Xiao):  Final Pathologic Diagnosis 1.  BIOPSY OF LEFT TRUE VOCAL CORD:   SEVERELY DYSPLASTIC APPEARING SQUAMOUS MUCOSA   INVASIVE CARCINOMA IS NOT DOCUMENTED   ON THE OTHER HAND, THESE FRAGMENTS ARE NODUL AND WITHOUT SUBMUCOSA FOR   EVALUATION; IT IS POSSIBLE THAT THEY REFLECT INVASIVE SQUAMOUS CARCINOMA   2.  BIOPSY OF LEFT ANTERIOR COMMISSURE:   MODERATE DYSPLASIA   NO INVASIVE CARCINOMA IDENTIFIED             Objective:     Vitals:  Blood pressure 107/67, pulse 81, temperature 98.2 °F (36.8 °C), resp. rate 18, height 5' 6" (1.676 m), weight 56.4 kg (124 lb 7.2 oz).    Physical Examination:   GEN: no apparent distress, comfortable; AAOx3  HEAD: atraumatic and normocephalic  EYES: no pallor, no icterus, PERRLA  ENT: OMM, no pharyngeal erythema, external ears WNL; no nasal discharge; no thrush; s/p laryngectomy with flap since healed well; trach   NECK: no masses, thyroid normal, trachea midline, no LAD/LN's, supple; post-op dissection healed well;    CV: RRR with no murmur; normal pulse; normal S1 and S2; no pedal edema; portacath (new)  CHEST: Normal respiratory effort; CTAB; normal breath sounds; no wheeze or crackles  ABDOM: nontender and nondistended; soft; normal bowel sounds; no rebound/guarding; peg tube  MUSC/Skeletal: ROM normal; no crepitus; joints normal; no deformities or arthropathy  EXTREM: no clubbing, cyanosis, inflammation or swelling  SKIN: no rashes, lesions, ulcers, petechiae or subcutaneous " nodules  : no mahan  NEURO: grossly intact; motor/sensory WNL; AAOx3; no tremors  PSYCH: normal mood, affect and behavior  LYMPH: normal cervical, supraclavicular, axillary and groin LN's          Labs:     Lab Results   Component Value Date    WBC 4.28 01/27/2023    HGB 10.3 (L) 01/27/2023    HCT 30.7 (L) 01/27/2023    MCV 93 01/27/2023     01/27/2023     CMP  Sodium   Date Value Ref Range Status   01/30/2023 137 136 - 145 mmol/L Final     Potassium   Date Value Ref Range Status   01/30/2023 4.1 3.5 - 5.1 mmol/L Final     Chloride   Date Value Ref Range Status   01/30/2023 104 95 - 110 mmol/L Final     CO2   Date Value Ref Range Status   01/30/2023 24 23 - 29 mmol/L Final     Glucose   Date Value Ref Range Status   01/30/2023 107 70 - 110 mg/dL Final     BUN   Date Value Ref Range Status   01/30/2023 21 8 - 23 mg/dL Final     Creatinine   Date Value Ref Range Status   01/30/2023 0.7 0.5 - 1.4 mg/dL Final     Calcium   Date Value Ref Range Status   01/30/2023 6.4 (LL) 8.7 - 10.5 mg/dL Final     Comment:     Calcium critical result(s) called and verbal readback obtained from   Briana Gil NP by HS3 01/30/2023 09:58       Total Protein   Date Value Ref Range Status   01/30/2023 6.5 6.0 - 8.4 g/dL Final     Albumin   Date Value Ref Range Status   01/30/2023 3.7 3.5 - 5.2 g/dL Final   11/18/2020 3.7 3.6 - 5.1 g/dL Final     Comment:     For additional information, please refer to   http://education.[employer].Seiratherm/faq/RSE166 (This link is   being provided for informational/ educational purposes only.)  This test was developed and its analytical performance   characteristics have been determined by [employer]. It has not been cleared or approved by the   [place] Food and Drug Administration. This assay has been validated   pursuant to the CLIA regulations and is used for clinical   purposes.  @ Test Performed By:  [facility]  Yo Cortes M.D.,    [address] [place] [address]  [place]  [id number]       Total Bilirubin   Date Value Ref Range Status   01/30/2023 1.0 0.1 - 1.0 mg/dL Final     Comment:     For infants and newborns, interpretation of results should be based  on gestational age, weight and in agreement with clinical  observations.    Premature Infant recommended reference ranges:  Up to 24 hours.............<8.0 mg/dL  Up to 48 hours............<12.0 mg/dL  3-5 days..................<15.0 mg/dL  6-29 days.................<15.0 mg/dL       Alkaline Phosphatase   Date Value Ref Range Status   01/30/2023 401 (H) 55 - 135 U/L Final     AST   Date Value Ref Range Status   01/30/2023 110 (H) 10 - 40 U/L Final     ALT   Date Value Ref Range Status   01/30/2023 148 (H) 10 - 44 U/L Final     Anion Gap   Date Value Ref Range Status   01/30/2023 9 8 - 16 mmol/L Final     eGFR if    Date Value Ref Range Status   07/29/2022 >60.0 >60 mL/min/1.73 m^2 Final     eGFR if non    Date Value Ref Range Status   07/29/2022 >60.0 >60 mL/min/1.73 m^2 Final     Comment:     Calculation used to obtain the estimated glomerular filtration  rate (eGFR) is the CKD-EPI equation.          Lab Results   Component Value Date    IRON 30 (L) 11/25/2022    TRANSFERRIN 114 (L) 11/25/2022    TIBC 169 (L) 11/25/2022    FESATURATED 18 (L) 11/25/2022        Radiology/Diagnostic Studies:      CTA Neck    Result Date: 9/20/2022  EXAMINATION: CTA NECK CLINICAL HISTORY: Head/neck cancer, monitor;Malignant neoplasm of base of tongue TECHNIQUE: CT angiogram was performed from the level of the scott to the EAC following the IV administration of 75mL of Omnipaque 350.   Sagittal and coronal reconstructions and maximum intensity projection reconstructions were performed. Arterial stenosis percentages are based on NASCET measurement criteria. COMPARISON: CTA neck dated 05/20/2022 FINDINGS: Aortic arch and great vessels: There is a  conventional left-sided 3 vessel arch.  The aortic arch is widely patent.  There is stable soft and calcified plaque in the proximal left subclavian artery with a similar appearance the prior study and possibly within radiation field but without critical stenosis or occlusion.  The right subclavian artery is patent.  The brachiocephalic trunk and origin of the left common carotid artery are patent. Carotid arteries Right carotid artery: There is calcified plaque scattered in the right common carotid artery without critical stenosis, occlusion or change.  There is no measurable stenosis of the internal carotid artery which is patent to the skull base. Left carotid artery: There is mild plaque scattered in the left common carotid artery without change and without critical stenosis or occlusion.  There is no measurable stenosis of the internal carotid artery. Vertebral arteries: The left vertebral artery is dominant and patent throughout its course in the neck.  The right vertebral artery is hypoplastic but patent.  There is no critical stenosis, occlusion, thrombus or dissection.  There is no change. The study extends intracranially.  There is calcified plaque in the V4 segment of the left vertebral artery resulting in a moderate to marked short segment nonocclusive stenosis.  The right vertebral artery partially terminates in a posteroinferior cerebellar artery with a short segment moderate to marked stenosis of the very distal right vertebral artery.  Some flow within the distal right vertebral artery may represent retrograde flow from the dominant left vertebral artery.  The basilar artery is patent.  The origins of the superior cerebellar and posterior cerebral artery on the right are patent.  There is fetal origin of the left posterior cerebral artery which is patent. There is plaque in the cavernous segments of both internal carotid arteries but there is no critical stenosis or large vessel occlusion of the  anterior circulation vessels.  There is no large aneurysm. Other: There is a similar appearance of the included upper lungs with pleural and parenchymal changes anteriorly in the upper lobes bilaterally.  As previously discussed, timing of the contrast bolus is performed during the arterial phase for CTA neck.  Therefore, enhancement of the soft tissues is somewhat suboptimal due to this technique. There has been a large region of soft tissue resection in the anterior mid and lower neck soft tissues with continued open defect in the upper chest and lower neck above the manubrium.  Soft tissue seen along the left posterolateral border of the trachea could represent secretions or mucus.  This was present previously. Redemonstrated is a large heterogeneously enhancing lesion centered at the level of the tongue base and floor of the mouth centrally into the left.  There is scattered calcifications.  The prior CTA demonstrated regions of central necrosis which are no longer definitely present but diffusely heterogeneous density and enhancement likely represents regions of necrosis.  This large heterogeneously enhancing soft tissue mass extends more anteriorly in the floor of the mouth on the left and measures at least 5.6 cm in greatest anterior to posterior dimension with 3.6 cm transverse dimension.  This does extend anteriorly to the medial margin of the body of the mandible on the left. There are post treatment changes with stranding in the neck fat.     1. Similar appearance of the neck vessels when compared to the prior CTA neck with mild narrowing at the origin of the left subclavian artery.  There is no critical stenosis, occlusion, thrombosis or dissection involving the cervical vertebral or carotid arteries. 2. Larger mass within the hypopharynx and tongue base extending anteriorly to the floor of the mouth on the left to the medial margin of the body of the mandible without obvious bony destruction. 3. There  is nonocclusive stenosis of the distal V4 segment of the left vertebral artery without change. 4. There is no large vessel occlusion or critical stenosis of the anterior circulation. 5. There is developmental variation with fetal origin of the left posterior cerebral artery. Electronically signed by: Rex Joyner MD Date:    09/20/2022 Time:    11:31    CT Abdomen Pelvis With Contrast    Result Date: 9/1/2022  CMS MANDATED QUALITY DATA - CT RADIATION - 436 All CT scans at this facility utilize dose modulation, iterative reconstruction, and/or weight based dosing when appropriate to reduce radiation dose to as low as reasonably achievable. Reason: abdominal pain partial history of laryngeal carcinoma TECHNIQUE: CT abdomen and pelvis with 100 mL Omnipaque 350. COMPARISON: PET/CT 5/13/2022 CT ABDOMEN: Coronary artery calcification and extremely tiny hiatal hernia incidentally noted. Visualized lung bases are clear. Liver, gallbladder, pancreas, spleen, adrenals, and kidneys are normal. Mild aortoiliac calcifications present. Gastrostomy tube tip lies in distal gastric body. Small intestines are unremarkable. Mild wall thickening affects the ascending colon with pneumatosis intestinalis diffusely affecting the ascending colon. No other free intraperitoneal gas or, and remainder of colon is normal. A normal appendix is present. The major mesenteric vascular structures are patent. No acute osseous abnormality. CT PELVIS: Prostate is slightly enlarged. Bladder is normal. No free pelvic fluid. Tiny right and small to moderate left fat-containing inguinal hernias are present. No acute osseous abnormality. IMPRESSION: 1. Pneumatosis intestinalis affecting the ascending colon with associated mild wall thickening. Etiology of this is undetermined. Potential considerations include intestinal ischemia or pneumatosis related to chemotherapy. Clinical and laboratory correlation is needed to assess significance. No portal  venous gas or free intraperitoneal air however. 2. Coronary artery calcifications Electronically signed by:  Nael Hui MD  9/1/2022 6:20 PM CDT Workstation: [id number]    NM PET CT Routine Skull to Mid Thigh    Result Date: 9/27/2022  PET CT WITH IMAGE FUSION HISTORY:  restage tongue cancer RESTAGING...  HX: TONGUE CA.  DX: April 2022.  LAST CHEMO TREATMENT WAS 3WKS AGO.  EVALUATE TX RESPONSE.   ALSO HX OF LARYNGEAL CA IN AUGUST 2020.  MULTIPLE SURGERIES: LARYNGECTOMY, THYROIDECTOMY & TRACHEOSTOMY.  RAD TX WAS COMPLETED IN NOV 2020. TECHNIQUE: Following IV administration of 11.2 mCi of F-18 labeled FDG into right antecubital fossa and a 60 minute delay, PET CT was performed from the vertex of the skull through the proximal thighs with an integrated PET CT scanner with image fusion. CT images were obtained to aid in attenuation correction and PET localization. The patient's serum glucose at the time of the exam was 136 mg/dL. COMPARISON: PET/CT 5/13/2022 FINDINGS: Poorly characterized soft tissue mass involving base of tongue approximately measures 4.3 x 2.8 cm (series 3 image 65) appearing similar to the prior exam. This reaches current max SUV of 11.8, not significantly changed from prior of 11.4. No other abnormal FDG activity. Intracranial compartment is unremarkable. Trace bilateral maxillary sinus mucosal thickening is present. Postoperative changes of laryngectomy and tracheostomy are present. Surgical clips occur throughout the neck. No definite enlarged cervical or supraclavicular lymph node, with evaluation limited by lack of IV contrast. Left subclavian port catheter tip terminates in SVC. Diffuse coronary artery calcifications are present. No enlarged mediastinal or axillary lymph nodes. No pulmonary nodule or mass. Gastrostomy tube tip lies in gastric body. Moderate aortoiliac calcifications are present. Small fat-containing left inguinal hernia incidentally noted. Mild degenerative changes affect  the spine. No suspicious osseous abnormality. IMPRESSION: 1. No significant change in the FDG avid mass involving the tongue base, remaining characteristic of malignancy. 2. No new FDG avid malignancy or metastatic disease. Electronically signed by:  Nael Hui MD  9/27/2022 2:38 PM CDT Workstation: [id number]    CT Abdomen Pelvis  Without Contrast    Result Date: 9/4/2022  CMS MANDATED QUALITY DATA - CT RADIATION  436 All CT scans at this facility utilize dose modulation, iterative reconstruction, and/or weight based dosing when appropriate to reduce radiation dose to as low as reasonably achievable. CT ABDOMEN PELVIS WITHOUT IV CONTRAST CLINICAL HISTORY: 72 years Male Follow-up pneumatosis COMPARISON: CT abdomen and pelvis September 1, 2022 FINDINGS: Imaging through the lower thorax demonstrates subsegmental atelectasis of the dependent lower lobes. Coronary artery calcification. Bone window images show no acute or aggressive osseous abnormality. Transitional lumbosacral vertebral body. On this unenhanced exam, no focal hepatic lesion. Gallbladder and biliary tree are unremarkable. Spleen appears normal. Pancreas is unremarkable. No adrenal lesion. No renal calculi or hydronephrosis. Ureters are normal in caliber. Urinary bladder is within normal limits. Gastrostomy tube is in place within the stomach. Stomach is largely collapsed. No evidence of small bowel obstruction. Pneumatosis and pericolonic abscess involving the ascending colon is redemonstrated, slightly diminished compared to prior. Mild wall thickening throughout the colon. No free fluid or free air within the abdomen or pelvis. Aortoiliac atherosclerotic calcification. No pathologically enlarged lymph nodes within the abdomen or pelvis. Bilateral fat-containing inguinal hernias, larger on the left. IMPRESSION: Pneumatosis and pericolonic gas about the ascending colon has decreased in volume compared to prior. Persistent colonic wall thickening which  could indicate colitis. Coronary and aortic atherosclerosis. Gastrostomy tube is within the stomach. Bilateral inguinal hernias. Electronically signed by:  Jose De Jesus Oleary MD  9/4/2022 4:06 PM CDT Workstation: [id number]    CTA neck  5/20/2022:     IMPRESSION:  Persistent mass within the hypopharynx consistent with malignancy.  By my measurements it is larger than on April 24, 2022 with central necrosis.  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 This is similar to the previous April 2022 study.  No evidence of arterial compromise related to malignancy.     PET 5/13/2022:     IMPRESSION:  1. Postoperative changes of prior laryngectomy and lymph node resection/radiation.  2. Masslike FDG avid lesion to the left of midline at the tongue base concerning for residual/recurrent disease.  3. Adjacent confluent nodular extension along the inferior left side of the mass.  4. No FDG avid lymphadenopathy or convincing additional sites of disease in the chest, abdomen or pelvis.     CT head 5/10/2022:  FINDINGS: Comparison to multiple prior exams. There is no acute intracranial hemorrhage, with no mass effect or abnormal extra-axial fluid. Mild scattered areas of nonspecific hypoattenuation involve the deep periventricular white matter, with gray-white differentiation maintained.     There is mild generalized prominence of the cortical sulci and ventricles. The cerebellum and brainstem are unremarkable. There are carotid siphon vascular calcifications. The visualized paranasal sinuses and mastoid air cells are clear. There is no acute calvarial fracture or scalp hematoma.     IMPRESSION: No acute intracranial hemorrhage or acute calvarial fracture     CT soft neck 4/24/2022;     Oral tongue/tongue base:  At the base of the tongue on the left there are findings of a heterogeneously enhancing mass measuring 2.1 cm highly concerning for a neoplastic process.     True  and false cords:  Patient status post laryngectomy which has been performed in the interim. Soft tissue stranding in the lower neck likely related to a previous flat reconstruction. No enhancement or lymphadenopathy within the lower neck.     Lymph node assessment:Negative     Surrounding soft tissues:  Negative     Vasculature:  Negative     Osseous structures:  Negative     Lung apices:  Scarring involving the lung apices bilaterally likely related to previous radiation.     IMPRESSION:  1. Postoperative and radiation changes involving the lower neck.  2. Findings highly concerning for a developing mass at the left base of the tongue enhancing 2.1 cm region. Direct visualization in this region would be of benefit.        CT soft neck  12/24/2021  IMPRESSION:  1.  Laryngeal mass as on prior exam. Laryngeal airway narrowing has not changed.  2.  No evidence for active hemorrhage.  3.  Partially visualized patchy groundglass infiltrates in both upper lobes. Also see CT chest report     CTA Chest 12/24/2021:  IMPRESSION:     1.  No pulmonary embolism.     2.  Soft tissue stranding in the region of the strap musculature with ill-defined hypodensity measuring 2.8 x 1.4 cm in the cervical midline.  Findings concerning for infectious process or abscess. Neoplastic process could have similar imaging characteristics.     3.  Suggested erosion of the proximal right parasymphyseal aspect of the thyroid shield.  Correlated with CT soft tissue neck findings. Findings could represent osteomyelitis. Neoplastic process cannot be excluded.     4.  Hepatic steatosis.  5.  Thickening of the gastric wall. Prominence of perigastric vasculature. Small hiatal hernia.     6.  Moderate stool burden.  Correlate for constipation.        PET 12/15/2021:  IMPRESSION:     Substantial qualitative and quantitative increase of hypermetabolic FDG activity associated with the larynx in this patient with known supraglottic neoplasm.     No evidence  of FDG avid metastatic disease involving neck, chest, abdomen or pelvis.     PET 8/21/2020:     Impression:     1. Intensely hypermetabolic plaque-like mass along the left true vocal cord, compatible with known laryngeal carcinoma.  2. No findings of regional metastatic disease in the neck, or distant metastatic disease.        CT Chest 8/10/2020:     IMPRESSION:     No metastatic disease within the chest.  Three-vessel coronary artery calcification. Nondilated cardiac  chambers     CT Soft neck 7/22/2020:  IMPRESSION:  1. Slight interval increase in size of the previously described  enhancing nodule along the anterior left vocal cord.  2. No pathologic lymphadenopathy.  3. Additional and incidental findings as noted above.     CT Soft neck  3/16/2020:     Impression:     6 mm enhancing focus involving the superior aspect of the left focal cord near the anterior commisure.  Recommend direct visualization for further evaluation of laryngeal polyp     Question of 2 mm submucosal lipoma involving the midportion of the superior aspect of the left vocal cord.     Mild arteriosclerosis involving the brachiocephalic arteries and carotid arteries     Mild degenerative change of the cervical spine        I have reviewed all available lab results and radiology reports.    Assessment/Plan:   (1) 72 y.o. 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     - Patient has been under the care of Dr Khoobehi with [facility] and Dr Portillo with rad/onc.   - He was recently found to have a new primary cancer involving the base of his tongue in April 2022. He was deemed not to be a candidate for any further radiation and did not want to undergo any further surgery as this would necessitate a glossectomy procedure.   - He has been on adjuvant chemotherapy per direction of Dr Khoobehi and has had 3 cycles. His  therapy course has been complicated with persistent diarrhea and N/V.      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 He saw Dr Lucero with Rad/onc this am. [place] are going [place] [facility] [place] about surgical options. If he is not a surgical candidate then will proceed with cisplatin and XRT combine therapy.     10/6/2022:  - He saw Dr Lucero with Rad/onc, and Dr James and his [place] [facility] [place]  he general consensus was to proceed to surgery.  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   - He had his portacath removed on 11/28.   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, keratinizing type, moderate to poorly differentiated tumor  - Four LN's were all negative  - he did have a positive left superior pharyngeal margin  - pT3 pN0  - reviewed the latest NCCN guidelines again from Version 1.2023; he may need some further systemic therapy due to the margin  - check on Rad/onc follow-up     1/23/2023:  - s/p new portacath placed on 1/12/2023 with Dr Le  -  starting combined chemotherapy and XRT - cisplatin  - s/p chemotherapy school with [facility]    1/30/2023:   - here for cycle 2 of Cisplatin   - will hold today due to severe hypocalcemia   - 2 grams of calcium gluconate today and tomorrow   - repeat ionized calcium tomorrow    - give cisplatin next week   - liquid calcium ordered for patient            (2) Hx/of Laryngeal cancer in Aug 2020 stage II (cT2 N0)  - s/p radiation followed by bilateral neck dissection and total thyroidectomy     Brief Oncology Summary for his laryngeal CA hx:     Patient was noted to initially have a suspicious lesion on the left true vocal cord by laryngoscopy with Dr Xiao in March 2020 with biopsy showing severe dysplastic changes without definitive invasive process. He had a CT scan on 3/16/2020 which showed a 6mm nodule on the left vocal cord. A repeat Ct scan four months later on 7/22/2020 showed the nodule enlarged to 1.4 x 1.1 cm in size. Patient was then seen by Dr Noel and underwent repeat scope in Aug 2020 who found a bulky deeply invading tumor which was debulked and the pathology coming back moderately differentiated SCCA without lymphovascular or perineural invasion. Hs case was subsequently presented to the tumor board and the consensus was to proceed with radiation. He completed XRT on 10/30/2020 and proceeded with regular laryngoscopy surveillance. He eventually required salvage laryngectomy and neck dissection with flap with Dr James on Jan 6th 2022. 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 Fifty lymph nodes were removed which were all negative. Pathology TNM was pT4a, pN0. Patient did not require any adjuvant therapy afterwards.      (2) HTN and hypercholesterolemia     (3) Paroxysmal atrial fibrillation, V tach     (4) DM II     (5) B12 deficiency      (6) Fatty liver disease, GERD            VISIT DIAGNOSES:      Malignant neoplasm of base of tongue    Hypocalcemia  -     CMP; Future; Expected date: 01/30/2023  -     Cancel: calcium chloride 2 g in dextrose 5 % (D5W) 100 mL IVPB    Laryngeal cancer          PLAN:  Hold Cisplatin today for hypocalcemia, give supplementation today start G tube supplemetation at home   - 2 GM of calcium gluconate today and tomorrow   F/u Rad/onc follow-up   F/u with Dr James with ENT and Dr Lucero with rad/onc  Check ionized calcium tomorrow   F/u with PCP, ENT, etc  Dietician f/u on the tube feeds  Sent RX to [facility]  pharmacy today   Resume cisplatin next week if labs are stable      RTC in 1 week with Dr. Tamez and 2 weeks with me     Discussion:     COVID-19 Discussion:     I had long discussion with patient and any applicable family about the COVID-19 coronavirus epidemic and the recommended precautions with regard to cancer and/or hematology patients. I have re-iterated the CDC recommendations for adequate hand washing, use of hand -like products, and coughing into elbow, etc. In addition, especially for our patients who are on chemotherapy and/or our otherwise immunocompromised patients, I have recommended avoidance of crowds, including movie theaters, restaurants, churches, etc. I have recommended avoidance of any sick or symptomatic family members and/or friends. I have also recommended avoidance of any raw and unwashed food products, and general avoidance of food items that have not been prepared by themselves. The patient has been asked to call us immediately with any symptom developments, issues, questions or other general concerns.         Pathology Discussion:     I reviewed and discussed the pathology report(s) and radiograph reports (if available) in as simple to understand and/or laymen's terms to the best of my ability. I had an indepth conversation with the patient and went over the patient's individual diagnosis based on the information  "that was currently available. I discussed the TNM staging process with regard to the patient's particular cancer type, and the calculated stage based on the currently available TNM data and literature. I discussed the available prognostic data with regard to the current staging information and how it relates to the prognosis of their particular neoplastic process.          NCCN Guidelines:     I discussed the available treatment option(s) in accordance with the latest literature from the NCCN Clinical Practice Guidelines for the patient's particular type of cancer disorder. The NCCN Guidelines provide a "document evidence-based (and) consensus-driven management" of the care of oncology patients. The treatment recommendations were made not only in accordance to the NCCN guidelines, but also factored in to account the patient's overall age, condition, performance status and their medical co-morbidities. I went over the risks and benefits of the the treatment options (if any could be made) with regard to their particular cancer type, their cancer stage, their age, and their co-morbidities.         Chemotherapy Discussion:      I discussed the available treatment option(s) in accordance with the latest/current national evidence-based guidelines (NCCN, UpToDate, NCI, ASCO, etc where applicable), their overall age/condition and their co-morbidities. I also went over the risks and benefits of the chemotherapy with regard to their particular cancer type, their cancer stage, their age/condition, and their co-morbidities. I provided literature on the chemotherapy regimen and discussed the chemotherapy side-effect profiles of the drug(s). I discussed the importance of compliance with obtaining and monitoring weekly lab work, and went over the potential hematopathology issues and risks with anemia, leucopenia and thrombocytopenia that can occur with chemotherapy. I discussed the potential risks of liver and kidney damage, which " could be permanent and could necessitate dialysis long-term if kidney failure developed. I discussed the emetic and/or diarrheal potential of the regimen and the potential need for use of antiemetic and anti-diarrheal medications. I discussed the risk for development of anaphylactic shock, bronchospasm, dysrhythmia, and respiratory/cardiovascular arrest and/or failure. I discussed the potential risks for development of alopecia, cold sensory issues, ringing in ears, vertigo, cataracts, glaucoma, and neuropathy, all of which could end up being chronic and life-long. Some chemotherpyI discussed the risks of hand-foot syndrome and rashes, and development of other autoimmune mediated processes such as pneumonitis, hepatitis, and colitis which could be life threatening. I discussed the risks of the potential development of a rare but fatal viral mediated disease known as PML (Progressive Multifocal Leukoencephalopathy), and risk of future development of leukemia and/or lymphoma from use of certain chemotherapy agents. I discussed the need for neutropenic precautions, basic hygiene/sanitation behaviors and dietary restrictions.    The patient's consent has been obtained to proceed with the chemotherapy.The patient will be referred to Chemotherapy School /[facility] for training and education on chemotherapy, use of antiemetics and/or anti-diarrheals, use of NSAID's, potential chemotherapy side-effects, and any specific recommendations and precautions with the particular chemotherapy agents.      I answered all of the patient's (and family's, if applicable) questions to the best of my ability and to their complete satisfaction. The patient acknowledged full understanding of the risks, recommendations and plan(s).     I have explained and the patient understands all of  the current recommendation(s). I have answered all of their questions to the best of my ability and to their complete satisfaction.         I spent  over 25 mins of time with the patient. Reviewed results of the recently ordered labs, tests and studies; made directives with regards to the results. Over half of this time was spent couseling and coordinating care.    I have explained all of the above in detail and the patient understands all of the current recommendation(s). I have answered all of their questions to the best of my ability and to their complete satisfaction.   The patient is to continue with the current management plan.            Electronically signed by Briana Martinez NP                 Answers submitted by the patient for this visit:  Review of Systems Questionnaire (Submitted on 1/20/2023)  appetite change : No  unexpected weight change: No  mouth sores: No  visual disturbance: Yes  cough: No  shortness of breath: No  chest pain: No  abdominal pain: No  diarrhea: No  frequency: Yes  back pain: No  rash: No  headaches: No  adenopathy: No  nervous/ anxious: Yes  Answers submitted by the patient for this visit:  Review of Systems Questionnaire (Submitted on 1/29/2023)  appetite change : No  unexpected weight change: No  mouth sores: No  visual disturbance: No  cough: No  shortness of breath: Yes  chest pain: No  abdominal pain: No  diarrhea: No  frequency: No  back pain: No  rash: No  headaches: No  adenopathy: No  nervous/ anxious: No

## 2023-01-30 NOTE — PROGRESS NOTES
Medical Nutrition Therapy Oncology Progress Note      Patient's PCP:Maico Patterson MD  Referring Provider: No ref. provider found  Subjective:        Patient ID: Cyrus Batres Jr. is a 72 y.o. male.    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    PEG (percutaneous endoscopic gastrostomy) adjustment/replacement/removal 11/22/2022    Postoperative 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    Tongue cancer     Tracheostomy dependence     Type 2 diabetes mellitus, without long-term current use of insulin 11/22/2022       Past Surgical History:   Procedure Laterality Date    DIRECT LARYNGOBRONCHOSCOPY N/A 12/27/2021    Procedure: LARYNGOSCOPY, DIRECT, WITH BRONCHOSCOPY;  Surgeon: Flex Espinosa MD;  Location[facility] OR [address] [place] [address];  Service: ENT;  Laterality: N/A;    DIRECT LARYNGOSCOPY  11/9/2022    Procedure: LARYNGOSCOPY, DIRECT;  Surgeon: Jesse James MD;  Location: [facility] OR [address] [place] [address];  Service: ENT;;    DISSECTION OF NECK Bilateral 1/6/2022    Procedure: DISSECTION, NECK;  Surgeon: Jesse James MD;  [facility];  Service: ENT;  Laterality: Bilateral;    DISSECTION OF NECK Bilateral 11/9/2022    Procedure: DISSECTION, NECK;  Surgeon: Jesse James MD;  Location: [facility] OR [address] [place] [address];  Service: ENT;  Laterality: Bilateral;    FLAP PROCEDURE Right 1/6/2022    Procedure: CREATION, FREE FLAP;  Surgeon: Alise Hart MD;  Location: [facility] OR [address] [place] [address];  Service: ENT;  Laterality: Right;  Ischemic start 1351  Ischemic stop 1502    FLAP PROCEDURE Left 11/9/2022     Procedure: CREATION, FREE FLAP, ALT;  Surgeon: Alise Hart MD;  Location: [facility] OR 2ND FLR;  Service: ENT;  Laterality: Left;    GLOSSECTOMY Bilateral 11/9/2022    Procedure: TOTAL GLOSSECTOMY;  Surgeon: Jesse James MD;  Location: [facility] OR 2ND FLR;  Service: ENT;  Laterality: Bilateral;    INSERTION OF TUNNELED CENTRAL VENOUS CATHETER (CVC) WITH SUBCUTANEOUS PORT N/A 6/9/2022    Procedure: [id number];  Surgeon: Jesus Viera MD;  Location: [facility] OR;  Service: General;  Laterality: N/A;    INSERTION OF TUNNELED CENTRAL VENOUS CATHETER (CVC) WITH SUBCUTANEOUS PORT Right 1/12/2023    Procedure: [id number];  Surgeon: Abdulaziz Le Jr., MD;  Location: [facility] OR;  Service: General;  Laterality: Right;    LARYNGECTOMY N/A 1/6/2022    Procedure: LARYNGECTOMY;  Surgeon: Jesse James MD;  [facility];  Service: ENT;  Laterality: N/A;    LARYNGOSCOPY N/A 8/4/2020    Procedure: Suspension microlaryngoscopy with biopsy, possible KTP laser treatment/excision;  Surgeon: Stew Noel MD;  [facility];  Service: ENT;  Laterality: N/A;  Microscope, telescopes, tower, microinstruments, KTP laser, rep conf# [id number] IC 7/28.    LARYNGOSCOPY N/A 3/16/2021    Procedure: Suspension microlaryngoscopy with excision of lesion, possible CO2 laser;  Surgeon: Stew Noel MD;  [facility];  Service: ENT;  Laterality: N/A;  Microscope, telescopes, tower, microinstruments, CO2 laser, rep conf# [id number] IC 3/4.    LARYNGOSCOPY N/A 4/1/2021    Procedure: Suspension microlaryngoscopy with KTP laser excision of lesion;  Surgeon: Stew Noel MD;  [facility];  Service: ENT;  Laterality: N/A;  Microscope, telescopes, tower, microinstruments, 70 degree scope, vocal fold , KTP laser, rep conf# [id number] BC    LARYNGOSCOPY N/A 12/9/2021    Procedure: Suspension microlaryngoscopy with biopsy;  Surgeon: Stew Noel MD;   Location: [facility] OR 2ND FLR;  Service: ENT;  Laterality: N/A;  Microscope, telescopes, tower, microinstruments    LARYNGOSCOPY N/A 1/6/2022    Procedure: LARYNGOSCOPY;  Surgeon: Jesse James MD;  Location: [facility] OR 2ND FLR;  Service: ENT;  Laterality: N/A;    LARYNGOSCOPY N/A 4/27/2022    Procedure: LARYNGOSCOPY WITH BIOPSY;  Surgeon: Jesse James MD;  Location: [facility] OR 2ND FLR;  Service: ENT;  Laterality: N/A;    MICROLARYNGOSCOPY N/A 3/17/2020    Procedure: MICROLARYNGOSCOPY;  Surgeon: Jung Xiao MD;  Location: [facility] OR;  Service: ENT;  Laterality: N/A;  Laser Microlaryngoscopy  NEED TO SCHEDULE LASER from [facility] [id number]    PHARYNGECTOMY  11/9/2022    Procedure: TOTAL PHARYNGECTOMY;  Surgeon: Jesse James MD;  Location: [facility] OR 2ND FLR;  Service: ENT;;    REIMPLANTATION OF PARATHYROID TISSUE N/A 1/6/2022    Procedure: REIMPLANTATION, PARATHYROID TISSUE;  Surgeon: Jesse James MD;  Location: [facility] OR 2ND FLR;  Service: ENT;  Laterality: N/A;    SKIN SPLIT GRAFT Right 11/9/2022    Procedure: APPLICATION, GRAFT, SKIN, SPLIT-THICKNESS;  Surgeon: Jesse James MD;  Location: [facility] OR 2ND FLR;  Service: ENT;  Laterality: Right;    THYROIDECTOMY  1/6/2022    Procedure: THYROIDECTOMY;  Surgeon: Jesse James MD;  Location: [facility] OR [facility] FLR;  Service: ENT;;    TRACHEOSTOMY N/A 12/27/2021    Procedure: CREATION, TRACHEOSTOMY;  Surgeon: Flex Espinosa MD;  Location: [facility] OR 2ND FLR;  Service: ENT;  Laterality: N/A;       Social History     Socioeconomic History    Marital status:      Spouse name: Janel Batres    Number of children: 2   Occupational History    Occupation: [employer]     Employer: [employer]   Tobacco Use    Smoking status: Never    Smokeless tobacco: Never   Substance and Sexual Activity    Alcohol use: Not Currently     Comment: occasional    Drug use: No    Sexual activity: Yes     Partners: Female   Social History Narrative    ** Merged  "History Encounter **         2 children from his 1st wife     Social Determinants of Health     Financial Resource Strain: Low Risk     Difficulty of Paying Living Expenses: Not hard at all   Food Insecurity: No Food Insecurity    Worried About Running Out of Food in the Last Year: Never true    Ran Out of Food in the Last Year: Never true   Transportation Needs: No Transportation Needs    Lack of Transportation (Medical): No    Lack of Transportation (Non-Medical): No   Physical Activity: Insufficiently Active    Days of Exercise per Week: 1 day    Minutes of Exercise per Session: 30 min   Stress: Stress Concern Present    Feeling of Stress : To some extent   Social Connections: Socially Isolated    Frequency of Communication with Friends and Family: Once a week    Frequency of Social Gatherings with Friends and Family: Once a week    Attends [employer] Services: Never    Active Member of Clubs or Organizations: No    Attends Club or Organization Meetings: Never    Marital Status:    Housing Stability: Low Risk     Unable to Pay for Housing in the Last Year: No    Number of Places Lived in the Last Year: 1    Unstable Housing in the Last Year: No       Family History   Problem Relation Age of Onset    Abnormal EKG Mother     Diabetes Father     Heart disease Father     Hypertension Father        Review of patient's allergies indicates:   Allergen Reactions    Lovastatin Itching    Pollen extracts     Lovastatin Rash     Not confirmed but pt skeptical       Current Outpatient Medications:     acetaminophen (TYLENOL) 325 MG tablet, Take 325 mg by mouth every 6 (six) hours as needed for Pain., Disp: , Rfl:     BD ULTRA-FINE YULISSA PEN NEEDLE 32 gauge x 5/32" Ndle, To be used with Novolog pen up to 4x a day, Disp: 100 each, Rfl: 3    calcitrioL (ROCALTROL) 1 mcg/mL solution, Take 0.25 mLs (0.25 mcg total) by Per G Tube route once daily., Disp: 15 mL, Rfl: 12    calcium carbonate (TUMS) 200 mg calcium (500 mg) " chewable tablet, 2 tablets (1,000 mg total) by Per G Tube route 5 (five) times daily., Disp: 300 tablet, Rfl: 11    calcium carbonate 500 mg/5 mL (1,250 mg/5 mL), Take 10 mLs (1,000 mg total) by Per G Tube route 3 (three) times daily with meals., Disp: 473 mL, Rfl: 12    esomeprazole (NEXIUM) 40 MG capsule, 40 mg before breakfast., Disp: , Rfl:     famotidine (PEPCID) 40 mg/5 mL (8 mg/mL) suspension, 2.5 mLs (20 mg total) by Per G Tube route 2 (two) times daily., Disp: 50 mL, Rfl: 11    ibuprofen (ADVIL,MOTRIN) 200 MG tablet, Take 200 mg by mouth every 6 (six) hours as needed for Pain., Disp: , Rfl:     insulin aspart U-100 (NOVOLOG FLEXPEN U-100 INSULIN) 100 unit/mL (3 mL) InPn pen, Inject 0-10 Units into the skin as needed; Add correction scale if needed while on TF.  Blood sugar 180-230 add 2 units, 231-280 +4 units, 281-330 +6 units, 331-380 +8 units, >380 +10 units.  MDD 40 units, Disp: 12 mL, Rfl: 0    lactose-reduced food with fibr (JEVITY 1.5 TRISHA) 0.06 gram-1.5 kcal/mL Liqd, 6 cartons per day via peg.  Flush 60ml before and after each bolus, Disp: 180 each, Rfl: 11    levoFLOXacin (LEVAQUIN) 250 mg/10 mL Soln, Take 30 mLs (750 mg total) by mouth once daily., Disp: 480 mL, Rfl: 0    levothyroxine (SYNTHROID) 100 MCG tablet, 1 tablet (100 mcg total) by Per G Tube route before breakfast., Disp: 30 tablet, Rfl: 11    losartan (COZAAR) 100 MG tablet, Take 0.5 tablets (50 mg total) by mouth once daily. (Patient taking differently: Take 50 mg by mouth every evening.), Disp: 45 tablet, Rfl: 3    metFORMIN (GLUCOPHAGE) 500 MG tablet, Take 1 tablet (500 mg total) by mouth 2 (two) times daily with meals., Disp: 60 tablet, Rfl: 3    ondansetron (ZOFRAN) 8 MG tablet, Take 1 tablet (8 mg total) by mouth every 8 (eight) hours as needed for Nausea., Disp: 30 tablet, Rfl: 2    promethazine (PHENERGAN) 25 MG tablet, Take 1 tablet (25 mg total) by mouth every 4 to 6 hours as needed for Nausea., Disp: 30 tablet, Rfl: 2     traZODone (DESYREL) 50 MG tablet, TAKE 1 TABLET BY MOUTH NIGHTLY AS NEEDED FOR INSOMNIA., Disp: 90 tablet, Rfl: 1    zolpidem (AMBIEN) 5 MG Tab, 1 tablet (5 mg total) by Per G Tube route nightly as needed (difficult with sleep)., Disp: 30 tablet, Rfl: 0  No current facility-administered medications for this visit.    Facility-Administered Medications Ordered in Other Visits:     aprepitant (CINVANTI) injection 130 mg, 130 mg, Intravenous, 1 time in Clinic/HOD, Venu Tamez MD    CISplatin (Platinol) 40 mg/m2 = 66 mg in sodium chloride 0.9% 631 mL chemo infusion, 40 mg/m2 (Treatment Plan Recorded), Intravenous, 1 time in Clinic/HOD, Venu Tamez MD    heparin, porcine (PF) 100 unit/mL injection flush 500 Units, 500 Units, Intravenous, PRN, Venu Tamez MD, 500 Units at 01/30/23 1309    palonosetron 0.25mg/dexAMETHasone 12mg in NS IVPB 0.25 mg 50 mL, 0.25 mg, Intravenous, 1 time in Clinic/HOD, Venu Tamez MD    sodium chloride 0.9% bolus 1,000 mL 1,000 mL, 1,000 mL, Intravenous, 1 time in Clinic/HOD, Venu Tamez MD    sodium chloride 0.9% flush 10 mL, 10 mL, Intravenous, PRN, Venu Tamez MD, 10 mL at 01/30/23 1309    All medications and past history have been reviewed.    OP HEAD NECK CISPLATIN WEEKLY + RADIOTHERAPY      Treatment Goal:   Curative      Status:   Active      Start Date:   1/23/2023      End Date:   3/12/2023 (Planned)      Provider:   Venu Tamez MD      Chemotherapy:   CISplatin (Platinol) 40 mg/m2 = 66 mg in sodium chloride 0.9% 631 mL chemo infusion, 40 mg/m2 = 66 mg, Intravenous, Clinic/HOD 1 time, 2 of 7 cycles    Administration: 66 mg (1/23/2023)      Objective:      Wt Readings from Last 20 Encounters:   01/30/23 56.4 kg (124 lb 7.2 oz)   01/30/23 56.4 kg (124 lb 7.2 oz)   01/27/23 58.5 kg (129 lb)   01/26/23 59 kg (130 lb 1.6 oz)   01/23/23 58.4 kg (128 lb 12 oz)   01/23/23 58.4 kg (128 lb 12 oz)   01/18/23 59.4 kg (130 lb 15.3 oz)   01/13/23  59.4 kg (130 lb 14.4 oz)   01/12/23 57.6 kg (127 lb)   01/09/23 57.7 kg (127 lb 3.3 oz)   01/03/23 58.5 kg (128 lb 15.5 oz)   01/03/23 57.7 kg (127 lb 4.8 oz)   12/27/22 58.9 kg (129 lb 12.8 oz)   12/16/22 59.1 kg (130 lb 4.7 oz)   11/25/22 62.1 kg (137 lb)   11/23/22 62.3 kg (137 lb 5.6 oz)   11/23/22 62.2 kg (137 lb 2 oz)   11/16/22 73.3 kg (161 lb 9.6 oz)   11/03/22 65.8 kg (145 lb)   11/02/22 65.3 kg (144 lb)       Last Labs:  Last Labs:  Glucose   Date Value Ref Range Status   01/30/2023 107 70 - 110 mg/dL Final   01/27/2023 87 70 - 110 mg/dL Final     BUN   Date Value Ref Range Status   01/30/2023 21 8 - 23 mg/dL Final   01/27/2023 24 (H) 8 - 23 mg/dL Final     Creatinine   Date Value Ref Range Status   01/30/2023 0.7 0.5 - 1.4 mg/dL Final   01/27/2023 0.9 0.5 - 1.4 mg/dL Final     Sodium   Date Value Ref Range Status   01/30/2023 137 136 - 145 mmol/L Final   01/27/2023 137 136 - 145 mmol/L Final     Potassium   Date Value Ref Range Status   01/30/2023 4.1 3.5 - 5.1 mmol/L Final   01/27/2023 3.9 3.5 - 5.1 mmol/L Final     Phosphorus   Date Value Ref Range Status   11/24/2022 2.4 (L) 2.7 - 4.5 mg/dL Final   11/23/2022 3.6 2.7 - 4.5 mg/dL Final     Calcium   Date Value Ref Range Status   01/30/2023 6.4 (LL) 8.7 - 10.5 mg/dL Final     Comment:     Calcium critical result(s) called and verbal readback obtained from   Briana Gil NP by [facility] 01/30/2023 09:58     01/27/2023 6.5 (LL) 8.7 - 10.5 mg/dL Final     Comment:     Ca critical result(s) repeated. Called and verbal readback obtained   from Dr. JENNIFER Nevarez by [facility] 01/27/2023 17:11       Prealbumin   Date Value Ref Range Status   09/03/2022 19 (L) 20.0 - 43.0 mg/dL Final     Total Protein   Date Value Ref Range Status   01/30/2023 6.5 6.0 - 8.4 g/dL Final   01/27/2023 6.7 6.0 - 8.4 g/dL Final     Cholesterol   Date Value Ref Range Status   02/14/2022 144 120 - 199 mg/dL Final     Comment:     The National Cholesterol Education Program (NCEP) has set  the  following guidelines (reference ranges) for Cholesterol:  Optimal.....................<200 mg/dL  Borderline High.............200-239 mg/dL  High........................> or = 240 mg/dL     11/18/2020 174 120 - 199 mg/dL Final     Comment:     The National Cholesterol Education Program (NCEP) has set the  following guidelines (reference ranges) for Cholesterol:  Optimal.....................<200 mg/dL  Borderline High.............200-239 mg/dL  High........................> or = 240 mg/dL       Hemoglobin A1C   Date Value Ref Range Status   11/22/2022 5.8 4.5 - 6.2 % Final     Comment:     According to ADA guidelines, hemoglobin A1C <7.0% represents  optimal control in non-pregnant diabetic patients.  Different  metrics may apply to specific populations.   Standards of Medical Care in Diabetes - 2016.    For the purpose of screening for the presence of diabetes:  <5.7%     Consistent with the absence of diabetes  5.7-6.4%  Consistent with increasing risk for diabetes   (prediabetes)  >or=6.5%  Consistent with diabetes    Currently no consensus exists for use of hemoglobin A1C  for diagnosis of diabetes for children.     11/09/2022 5.7 (H) 4.0 - 5.6 % Final     Comment:     ADA Screening Guidelines:  5.7-6.4%  Consistent with prediabetes  >or=6.5%  Consistent with diabetes    High levels of fetal hemoglobin interfere with the HbA1C  assay. Heterozygous hemoglobin variants (HbS, HgC, etc)do  not significantly interfere with this assay.   However, presence of multiple variants may affect accuracy.       Hemoglobin   Date Value Ref Range Status   01/27/2023 10.3 (L) 14.0 - 18.0 g/dL Final   01/13/2023 10.8 (L) 14.0 - 18.0 g/dL Final     POC Hematocrit   Date Value Ref Range Status   11/09/2022 25 (L) 36 - 54 %PCV Final   11/09/2022 24 (L) 36 - 54 %PCV Final     Hematocrit   Date Value Ref Range Status   01/27/2023 30.7 (L) 40.0 - 54.0 % Final   01/13/2023 32.8 (L) 40.0 - 54.0 % Final     Iron   Date Value Ref Range  Status   11/25/2022 30 (L) 45 - 160 ug/dL Final   09/04/2022 58 45 - 160 ug/dL Final     No components found for: FROLATE  Vit D, 25-Hydroxy   Date Value Ref Range Status   02/14/2022 37 30 - 96 ng/mL Final     Comment:     Vitamin D deficiency.........<10 ng/mL                              Vitamin D insufficiency......10-29 ng/mL       Vitamin D sufficiency........> or equal to 30 ng/mL  Vitamin D toxicity............>100 ng/mL     11/18/2020 38 30 - 96 ng/mL Final     Comment:     Vitamin D deficiency.........<10 ng/mL                              Vitamin D insufficiency......10-29 ng/mL       Vitamin D sufficiency........> or equal to 30 ng/mL  Vitamin D toxicity............>100 ng/mL       WBC   Date Value Ref Range Status   01/27/2023 4.28 3.90 - 12.70 K/uL Final   01/13/2023 4.47 3.90 - 12.70 K/uL Final       Assessment:     Nutrition/Diet History     Patient Reported Diet/Restrictions/Preferences: NPO  Food Allergies: NKFA  Factors Affecting Nutritional Intake: s/p laryngectomy, glossectomy    Estimated/Assessed Needs     Weight Used For Calorie Calculations: 62 kg (137 lb)  Energy Calorie Requirements (kcal): 8202-4514 kcal/day   Energy Need Method: 30-35 Kcal/kg  Protein Requirements:  g/day   Protein Need Method: 1.5-2.0 g/kg  Fluid Requirements: 2000 ml/day  Estimated Fluid Requirement Method: 1ml/kcal      Nutrition Support  Current EN: Jevity 1.5- 6 cartons per day with 60ml FWF before and after each bolus. Provides 2130 calories, 90g protein and 1800ml FW. Recommend extra 60ml FWF TID to meet estimated fluid needs.    NEW EN RECOMMENDATIONS: [employer] 1.4- 5 cartons per day (1625ml total). Bolus 1 carton (325ml) 6x/d with 60ml FWF before and after each bolus. TF and FWF provides 2275 calories, 100g protein and 1770ml FW. Recommend extra 60ml FWF QID to meet estimated fluid needs. Recommended Prostat- 1 oz daily via peg to provide extra 15g protein to meet higher range of protein needs daily.      Evaluation of Received Nutrient/Fluid Intake     Calorie Intake: meeting needs  Protein Intake: meeting needs  Fluid Intake: meeting needs  Tolerance: tolerating  % Intake of Estimated Energy Needs: 100 % via peg      Nutrition Diagnosis Related to (Etiology) As Evidenced By (Signs/Symptoms)   Inadequate protein intake Physiological causes increasing nutrient needs BoT cancer, scheduled surgery, Estimated intake of protein insufficient to meet requirements     RD Notes  Mr. Cyrus Batres is a 71y/o male with 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. Pt is schedule for glossectomy surgery for his base of tongue cancer in 1 month. He is currently NPO and recently switch to Jevity 1.5 and is tolerating 6 cartons per day without associated N/V/D, gas or bloating. He does have chronic diarrhea not associated with feeding times. He present today with questions regarding how he can increase his nutrition to prepare for surgery next month. He reports he would like to switch to [employer] to promote regular BMs and help control BG. He reports recently weight gain of 7-8# since increasing his TF to 6 cartons per day. CW: 137#    1/23/22: RD met with Mr Batres infusion today for nutrition follow up. Pt will be starting new treatment with cisplatin and concurrent XRT s/p extensive resection last month. Pt denies having any current problems with peg feedings. He has good knowledge of nutrition related side effects of treatment. Denies N/V/D/C. BMs normal. CW: 128#    1/30: Pt reports he continues to bolus 5 cartons of Jevity per day without s/s of intolerance. He is working himself up to 6 cartons per day due to increased needs during CCXRT. He denies N/V/D/C. He reports good hydration via peg. Noted 43 weight loss in 1 week but pt reports he was wearing a heavy coat last week when his weight was taken. CW: 124#    Nutrition Intervention:      Nutrition Intervention Enteral Nutrition   Composition   Goals/Expected Outcomes Continue [employer] 1.4- 6 cartons per day to meet estimated nutritional needs and promote regular BMs   Progress Progressing towards goal     Nutrition Intervention Fluid-modified diet   Goals/Expected Outcomes Continue aggressive hydration via peg to prevent dehydration during therapy   Progress Initial     Plan  Continue EN at goal rate. Advance to 6 cartons per day as tolerated to prevent unintentional weight loss  Continue aggressive hydration via peg to prevent dehydration  Provided RD contact info. Encouraged pt to contact RD with questions or concerns    Monitoring/Evaluation:     Monitor: TF tolerance, weight, BMs    Next Visit: f/u weekly or as needed      I have explained and the patient understands all of  the current recommendation(s). I have answered all of their questions to the best of my ability and to their complete satisfaction.   The patient is to continue with the current management plan.    Electronically signed by: Barbara Zayas MBA, JAMIRN, LDN

## 2023-01-30 NOTE — PLAN OF CARE
Problem: Fatigue  Goal: Improved Activity Tolerance  1/30/2023 0736 by Elisabeth Rush RN  Outcome: Met  1/30/2023 0736 by Elisabeth Rush RN  Outcome: Ongoing, Progressing

## 2023-01-31 ENCOUNTER — INFUSION (OUTPATIENT)
Dept: INFUSION THERAPY | Facility: HOSPITAL | Age: 73
End: 2023-01-31
Attending: INTERNAL MEDICINE
Payer: MEDICARE

## 2023-01-31 VITALS
RESPIRATION RATE: 18 BRPM | DIASTOLIC BLOOD PRESSURE: 69 MMHG | WEIGHT: 129.44 LBS | BODY MASS INDEX: 20.8 KG/M2 | SYSTOLIC BLOOD PRESSURE: 111 MMHG | TEMPERATURE: 98 F | HEIGHT: 66 IN | HEART RATE: 74 BPM

## 2023-01-31 DIAGNOSIS — E83.51 HYPOCALCEMIA: Primary | ICD-10-CM

## 2023-01-31 DIAGNOSIS — C01 MALIGNANT NEOPLASM OF BASE OF TONGUE: ICD-10-CM

## 2023-01-31 DIAGNOSIS — C32.9 LARYNX CANCER: ICD-10-CM

## 2023-01-31 DIAGNOSIS — E86.0 DEHYDRATION: ICD-10-CM

## 2023-01-31 PROCEDURE — 96366 THER/PROPH/DIAG IV INF ADDON: CPT

## 2023-01-31 PROCEDURE — 25000003 PHARM REV CODE 250: Performed by: INTERNAL MEDICINE

## 2023-01-31 PROCEDURE — 63600175 PHARM REV CODE 636 W HCPCS: Performed by: NURSE PRACTITIONER

## 2023-01-31 PROCEDURE — 63600175 PHARM REV CODE 636 W HCPCS: Performed by: INTERNAL MEDICINE

## 2023-01-31 PROCEDURE — A4216 STERILE WATER/SALINE, 10 ML: HCPCS | Performed by: INTERNAL MEDICINE

## 2023-01-31 PROCEDURE — 25000003 PHARM REV CODE 250: Performed by: NURSE PRACTITIONER

## 2023-01-31 PROCEDURE — 96365 THER/PROPH/DIAG IV INF INIT: CPT

## 2023-01-31 RX ORDER — SODIUM CHLORIDE 0.9 % (FLUSH) 0.9 %
10 SYRINGE (ML) INJECTION
Status: DISCONTINUED | OUTPATIENT
Start: 2023-01-31 | End: 2023-01-31 | Stop reason: HOSPADM

## 2023-01-31 RX ORDER — HEPARIN 100 UNIT/ML
500 SYRINGE INTRAVENOUS
Status: CANCELLED | OUTPATIENT
Start: 2023-01-31

## 2023-01-31 RX ORDER — HEPARIN 100 UNIT/ML
500 SYRINGE INTRAVENOUS
Status: DISCONTINUED | OUTPATIENT
Start: 2023-01-31 | End: 2023-01-31 | Stop reason: HOSPADM

## 2023-01-31 RX ORDER — SODIUM CHLORIDE 0.9 % (FLUSH) 0.9 %
10 SYRINGE (ML) INJECTION
Status: CANCELLED | OUTPATIENT
Start: 2023-01-31

## 2023-01-31 RX ADMIN — CALCIUM GLUCONATE: 98 INJECTION, SOLUTION INTRAVENOUS at 01:01

## 2023-01-31 RX ADMIN — HEPARIN 500 UNITS: 100 SYRINGE at 03:01

## 2023-01-31 RX ADMIN — SODIUM CHLORIDE, PRESERVATIVE FREE 10 ML: 5 INJECTION INTRAVENOUS at 03:01

## 2023-01-31 NOTE — PLAN OF CARE
Problem: Fatigue  Goal: Improved Activity Tolerance  1/31/2023 1328 by Elisabeth Rush RN  Outcome: Met  1/31/2023 1328 by Elisabeth Rush RN  Outcome: Ongoing, Progressing

## 2023-02-02 NOTE — PROGRESS NOTES
[facility] Hematology/Oncology  PROGRESS NOTE -  Follow-up Visit      Subjective:       Patient ID:   NAME: Cyrus Batres Jr. : 1950     72 y.o. 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  The patient is here today to go over the results of the recently ordered labs, tests and studies.     He is getting chemotherapy again today and continues with the XRT. He has healed up well from his prior surgery.  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 He had his portacath removed on  and replaced on 2023 by Dr Le    He is breathing ok. No HA's or CP; no pain at this time        Discussed covid19 and he has been vaccinated      ROS:   GEN: normal without any fever, night sweats or weight loss; doing well with tube feeds   HEENT: normal with no HA's, sore throat, stiff neck, changes in vision  CV: normal with no CP, SOB, PND, GRIFFIN or orthopnea  PULM: normal with no SOB, cough, hemoptysis, sputum or pleuritic pain  GI: no current N/V  ; has peg tube;    : normal with no hematuria, dysuria  BREAST: normal with no mass, discharge, pain  SKIN: normal with no rash, erythema, bruising, or swelling     Pain Scale:  0    Allergies:  Review of patient's allergies indicates:   Allergen Reactions    Lovastatin Itching    Pollen  "extracts     Lovastatin Rash     Not confirmed but pt skeptical       Medications:    Current Outpatient Medications:     acetaminophen (TYLENOL) 325 MG tablet, Take 325 mg by mouth every 6 (six) hours as needed for Pain., Disp: , Rfl:     BD ULTRA-FINE YULISSA PEN NEEDLE 32 gauge x 5/32" Ndle, To be used with Novolog pen up to 4x a day, Disp: 100 each, Rfl: 3    calcitrioL (ROCALTROL) 1 mcg/mL solution, Take 0.25 mLs (0.25 mcg total) by Per G Tube route once daily., Disp: 15 mL, Rfl: 12    calcium carbonate (TUMS) 200 mg calcium (500 mg) chewable tablet, 2 tablets (1,000 mg total) by Per G Tube route 5 (five) times daily., Disp: 300 tablet, Rfl: 11    calcium carbonate 500 mg/5 mL (1,250 mg/5 mL), Take 10 mLs (1,000 mg total) by Per G Tube route 3 (three) times daily with meals., Disp: 473 mL, Rfl: 12    esomeprazole (NEXIUM) 40 MG capsule, 40 mg before breakfast., Disp: , Rfl:     famotidine (PEPCID) 40 mg/5 mL (8 mg/mL) suspension, 2.5 mLs (20 mg total) by Per G Tube route 2 (two) times daily., Disp: 50 mL, Rfl: 11    ibuprofen (ADVIL,MOTRIN) 200 MG tablet, Take 200 mg by mouth every 6 (six) hours as needed for Pain., Disp: , Rfl:     insulin aspart U-100 (NOVOLOG FLEXPEN U-100 INSULIN) 100 unit/mL (3 mL) InPn pen, Inject 0-10 Units into the skin as needed; Add correction scale if needed while on TF.  Blood sugar 180-230 add 2 units, 231-280 +4 units, 281-330 +6 units, 331-380 +8 units, >380 +10 units.  MDD 40 units, Disp: 12 mL, Rfl: 0    lactose-reduced food with fibr (JEVITY 1.5 TRISHA) 0.06 gram-1.5 kcal/mL Liqd, 6 cartons per day via peg.  Flush 60ml before and after each bolus, Disp: 180 each, Rfl: 11    levoFLOXacin (LEVAQUIN) 250 mg/10 mL Soln, Take 30 mLs (750 mg total) by mouth once daily., Disp: 480 mL, Rfl: 0    levothyroxine (SYNTHROID) 100 MCG tablet, 1 tablet (100 mcg total) by Per G Tube route before breakfast., Disp: 30 tablet, Rfl: 11    losartan (COZAAR) 100 MG tablet, Take 0.5 tablets (50 mg total) " by mouth once daily. (Patient taking differently: Take 50 mg by mouth every evening.), Disp: 45 tablet, Rfl: 3    metFORMIN (GLUCOPHAGE) 500 MG tablet, Take 1 tablet (500 mg total) by mouth 2 (two) times daily with meals., Disp: 60 tablet, Rfl: 3    ondansetron (ZOFRAN) 8 MG tablet, Take 1 tablet (8 mg total) by mouth every 8 (eight) hours as needed for Nausea., Disp: 30 tablet, Rfl: 2    promethazine (PHENERGAN) 25 MG tablet, Take 1 tablet (25 mg total) by mouth every 4 to 6 hours as needed for Nausea., Disp: 30 tablet, Rfl: 2    traZODone (DESYREL) 50 MG tablet, TAKE 1 TABLET BY MOUTH NIGHTLY AS NEEDED FOR INSOMNIA., Disp: 90 tablet, Rfl: 1    zolpidem (AMBIEN) 5 MG Tab, 1 tablet (5 mg total) by Per G Tube route nightly as needed (difficult with sleep)., Disp: 30 tablet, Rfl: 0  No current facility-administered medications for this visit.    Facility-Administered Medications Ordered in Other Visits:     CISplatin (Platinol) 40 mg/m2 = 66 mg in sodium chloride 0.9% 631 mL chemo infusion, 40 mg/m2 (Treatment Plan Recorded), Intravenous, 1 time in Clinic/HOD, Venu Taemz MD    heparin, porcine (PF) 100 unit/mL injection flush 500 Units, 500 Units, Intravenous, PRN, Venu Tamez MD    palonosetron 0.25mg/dexAMETHasone 12mg in NS IVPB 0.25 mg 50 mL, 0.25 mg, Intravenous, 1 time in Clinic/HOD, Venu Tamez MD, Last Rate: 150 mL/hr at 02/06/23 0945, 0.25 mg at 02/06/23 0945    sodium chloride 0.9% bolus 1,000 mL 1,000 mL, 1,000 mL, Intravenous, 1 time in Clinic/HOD, Venu Tamez MD    sodium chloride 0.9% flush 10 mL, 10 mL, Intravenous, PRN, Venu Tamez MD    PMHx/PSHx Updates:  See patient's last visit with me on 1/23/2023  See H&P on 9/23/2022        Pathology:   Cancer Staging   Larynx cancer  Staging form: Larynx - Glottis, AJCC 8th Edition  - Clinical stage from 8/6/2020: Stage II (cT2, cN0, cM0) - Signed by Fariha Muñoz NP on 8/6/2020  - Pathologic stage from 1/20/2022:  "Stage LOU (pT4a, pN0, cM0) - Signed by Fariha Muñoz NP on 1/20/2022  Staging form: Pharynx - P16 Negative Oropharynx, AJCC 8th Edition  - Clinical: Stage II (rcT2, cN0, cM0) - Signed by Matheus Portillo Jr., MD on 5/30/2022    Malignant neoplasm of base of tongue  Staging form: Pharynx - P16 Negative Oropharynx, AJCC 8th Edition  - Clinical stage from 5/20/2022: Stage II (cT2, cN0, cM0, p16-) - Signed by Saúl Kessler MD on 7/7/2022    Dissection 11/9/2022:    Final Pathologic Diagnosis 1. "left submandibular lymph node", dissection:       - Three lymph nodes, negative for carcinoma (0/3)   2. 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       - Tumor size: 4.5 cm       - Resection margins: left superior pharyngeal margin focally involved;   all other margins are negative for carcinoma       - Left internal jugular vein: free of tumor       - One lymph node, negative for carcinoma (0/1)   Note: P16 immunostain is negative     Tumor Site       Oropharynx  involving Base of tongue       Tumor Laterality       Midline       Tumor Size       Greatest Dimension (Centimeters) 4.5 cm         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)       Histologic Grade       G2 to G3: Moderate to Poorly differentiated       Lymphovascular Invasion       Not identified       Perineural Invasion       Not identified     MARGINS      Margins       Involved by invasive tumor     Margin(s)   Involved by Invasive Tumor:      left superior pharyngeal margin; all other   margins are free of tumor     LYMPH NODES    Regional Lymph Node Status:    :     Regional Lymph Node   Status:      All regional lymph nodes negative for tumor      pT Category:               pT3   pN Category:               pN0      Base of Tongue Biopsy  4/27/2022:  Final Pathologic Diagnosis BIOPSY OF BASE OF THE TONGUE:   THE INFILTRATING POORLY " DIFFERENTIATED SQUAMOUS CELL CARCINOMA   THE TUMOR IS P16 NEGATIVE.  THE POSITIVE AND NEGATIVE CONTROLS STAINED   APPROPRIATELY      Larynx resection/thyroidectomy 1/6/2022:     Final Pathologic Diagnosis 1. Lymph nodes, left neck levels 2,  3 and 4, dissection:   - Nineteen lymph nodes, all  negative  for metastatic carcinoma (0/19)   2. Lymph nodes, right neck levels 2,  3 and 4, dissection:   - Twenty-eight lymph nodes, all  negative  for metastatic carcinoma (0/28)   3. Possible parathyroid gland, excision:   - Benign parathyroid gland tissue (0.003 g)   4. 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 Soft tissue, posterior tracheal margin, excision:   - Benign, focally reactive respiratory mucosa with submucosal acute   inflammation,  negative  for dysplasia or malignancy   6. Soft tissue, anterior tracheal margin, excision:   - Benign respiratory mucosa with subepithelial cartilage,  negative  for   dysplasia or malignancy   7. Soft tissue, posterior tracheal margin #2, excision:   - Benign, focally reactive respiratory mucosa with submucosal acute   inflammation,  negative  for dysplasia or malignancy   8. Soft tissue, anterior tracheal margin #2, excision:   - Benign, reactive focally ulcerated respiratory mucosa with submucosal acute   inflammation,  negative  for dysplasia or malignancy             Laryngoscope 8/4/2020: (with Dr Noel):  Final Pathologic Diagnosis 1.  Left true vocal fold, biopsy:       -  Invasive moderately differentiated  "squamous cell carcinoma,   keratinizing type   2.  Left true vocal fold, biopsy:       -  Invasive moderately differentiated squamous cell carcinoma,   keratinizing type             Laryngoscope 3/17/2020 (with Dr Xiao):  Final Pathologic Diagnosis 1.  BIOPSY OF LEFT TRUE VOCAL CORD:   SEVERELY DYSPLASTIC APPEARING SQUAMOUS MUCOSA   INVASIVE CARCINOMA IS NOT DOCUMENTED   ON THE OTHER HAND, THESE FRAGMENTS ARE NODUL AND WITHOUT SUBMUCOSA FOR   EVALUATION; IT IS POSSIBLE THAT THEY REFLECT INVASIVE SQUAMOUS CARCINOMA   2.  BIOPSY OF LEFT ANTERIOR COMMISSURE:   MODERATE DYSPLASIA   NO INVASIVE CARCINOMA IDENTIFIED             Objective:     Vitals:  Blood pressure (!) 99/55, pulse 80, temperature 98 °F (36.7 °C), resp. rate 18, height 5' 6" (1.676 m), weight 56.8 kg (125 lb 3.2 oz), SpO2 100 %.    Physical Examination:   GEN: no apparent distress, comfortable; AAOx3  HEAD: atraumatic and normocephalic  EYES: no pallor, no icterus, PERRLA  ENT: OMM, no pharyngeal erythema, external ears WNL; no nasal discharge; no thrush; s/p laryngectomy with flap since healed well; trach   NECK: no masses, thyroid normal, trachea midline, no LAD/LN's, supple; post-op dissection healed well;    CV: RRR with no murmur; normal pulse; normal S1 and S2; no pedal edema; portacath   CHEST: Normal respiratory effort; CTAB; normal breath sounds; no wheeze or crackles  ABDOM: nontender and nondistended; soft; normal bowel sounds; no rebound/guarding; peg tube  MUSC/Skeletal: ROM normal; no crepitus; joints normal; no deformities or arthropathy  EXTREM: no clubbing, cyanosis, inflammation or swelling  SKIN: no rashes, lesions, ulcers, petechiae or subcutaneous nodules  : no mahan  NEURO: grossly intact; motor/sensory WNL; AAOx3; no tremors  PSYCH: normal mood, affect and behavior  LYMPH: normal cervical, supraclavicular, axillary and groin LN's          Labs:     Lab Results   Component Value Date    WBC 5.30 02/03/2023    HGB 11.0 (L) " 02/03/2023    HCT 33.5 (L) 02/03/2023    MCV 95 02/03/2023     02/03/2023     CMP  Sodium   Date Value Ref Range Status   02/03/2023 138 136 - 145 mmol/L Final     Potassium   Date Value Ref Range Status   02/03/2023 3.9 3.5 - 5.1 mmol/L Final     Chloride   Date Value Ref Range Status   02/03/2023 101 95 - 110 mmol/L Final     CO2   Date Value Ref Range Status   02/03/2023 25 23 - 29 mmol/L Final     Glucose   Date Value Ref Range Status   02/03/2023 108 70 - 110 mg/dL Final     BUN   Date Value Ref Range Status   02/03/2023 18 8 - 23 mg/dL Final     Creatinine   Date Value Ref Range Status   02/03/2023 0.9 0.5 - 1.4 mg/dL Final     Calcium   Date Value Ref Range Status   02/03/2023 8.3 (L) 8.7 - 10.5 mg/dL Final     Total Protein   Date Value Ref Range Status   02/03/2023 8.0 6.0 - 8.4 g/dL Final     Albumin   Date Value Ref Range Status   02/03/2023 4.4 3.5 - 5.2 g/dL Final   11/18/2020 3.7 3.6 - 5.1 g/dL Final     Comment:     For additional information, please refer to   http://education.[employer]/faq/EFX249 (This link is   being provided for informational/ educational purposes only.)  This test was developed and its analytical performance   characteristics have been determined by [facility]. It has not been cleared or approved by the   [place] Food and Drug Administration. This assay has been validated   pursuant to the CLIA regulations and is used for clinical   purposes.  @ Test Performed By:  [employer]  Yo Cortes M.D.,   [address] [place] [id number]  CLIA  [id number]       Total Bilirubin   Date Value Ref Range Status   02/03/2023 1.1 (H) 0.1 - 1.0 mg/dL Final     Comment:     For infants and newborns, interpretation of results should be based  on gestational age, weight and in agreement with clinical  observations.    Premature Infant recommended reference ranges:  Up to 24  hours.............<8.0 mg/dL  Up to 48 hours............<12.0 mg/dL  3-5 days..................<15.0 mg/dL  6-29 days.................<15.0 mg/dL       Alkaline Phosphatase   Date Value Ref Range Status   02/03/2023 476 (H) 55 - 135 U/L Final     AST   Date Value Ref Range Status   02/03/2023 96 (H) 10 - 40 U/L Final     ALT   Date Value Ref Range Status   02/03/2023 149 (H) 10 - 44 U/L Final     Anion Gap   Date Value Ref Range Status   02/03/2023 12 8 - 16 mmol/L Final     eGFR if    Date Value Ref Range Status   07/29/2022 >60.0 >60 mL/min/1.73 m^2 Final     eGFR if non    Date Value Ref Range Status   07/29/2022 >60.0 >60 mL/min/1.73 m^2 Final     Comment:     Calculation used to obtain the estimated glomerular filtration  rate (eGFR) is the CKD-EPI equation.          Lab Results   Component Value Date    IRON 30 (L) 11/25/2022    TRANSFERRIN 114 (L) 11/25/2022    TIBC 169 (L) 11/25/2022    FESATURATED 18 (L) 11/25/2022            Radiology/Diagnostic Studies:      CTA Neck    Result Date: 9/20/2022  EXAMINATION: CTA NECK CLINICAL HISTORY: Head/neck cancer, monitor;Malignant neoplasm of base of tongue TECHNIQUE: CT angiogram was performed from the level of the scott to the EAC following the IV administration of 75mL of Omnipaque 350.   Sagittal and coronal reconstructions and maximum intensity projection reconstructions were performed. Arterial stenosis percentages are based on NASCET measurement criteria. COMPARISON: CTA neck dated 05/20/2022 FINDINGS: Aortic arch and great vessels: There is a conventional left-sided 3 vessel arch.  The aortic arch is widely patent.  There is stable soft and calcified plaque in the proximal left subclavian artery with a similar appearance the prior study and possibly within radiation field but without critical stenosis or occlusion.  The right subclavian artery is patent.  The brachiocephalic trunk and origin of the left common carotid artery  are patent. Carotid arteries Right carotid artery: There is calcified plaque scattered in the right common carotid artery without critical stenosis, occlusion or change.  There is no measurable stenosis of the internal carotid artery which is patent to the skull base. Left carotid artery: There is mild plaque scattered in the left common carotid artery without change and without critical stenosis or occlusion.  There is no measurable stenosis of the internal carotid artery. Vertebral arteries: The left vertebral artery is dominant and patent throughout its course in the neck.  The right vertebral artery is hypoplastic but patent.  There is no critical stenosis, occlusion, thrombus or dissection.  There is no change. The study extends intracranially.  There is calcified plaque in the V4 segment of the left vertebral artery resulting in a moderate to marked short segment nonocclusive stenosis.  The right vertebral artery partially terminates in a posteroinferior cerebellar artery with a short segment moderate to marked stenosis of the very distal right vertebral artery.  Some flow within the distal right vertebral artery may represent retrograde flow from the dominant left vertebral artery.  The basilar artery is patent.  The origins of the superior cerebellar and posterior cerebral artery on the right are patent.  There is fetal origin of the left posterior cerebral artery which is patent. There is plaque in the cavernous segments of both internal carotid arteries but there is no critical stenosis or large vessel occlusion of the anterior circulation vessels.  There is no large aneurysm. Other: There is a similar appearance of the included upper lungs with pleural and parenchymal changes anteriorly in the upper lobes bilaterally.  As previously discussed, timing of the contrast bolus is performed during the arterial phase for CTA neck.  Therefore, enhancement of the soft tissues is somewhat suboptimal due to this  technique. There has been a large region of soft tissue resection in the anterior mid and lower neck soft tissues with continued open defect in the upper chest and lower neck above the manubrium.  Soft tissue seen along the left posterolateral border of the trachea could represent secretions or mucus.  This was present previously. Redemonstrated is a large heterogeneously enhancing lesion centered at the level of the tongue base and floor of the mouth centrally into the left.  There is scattered calcifications.  The prior CTA demonstrated regions of central necrosis which are no longer definitely present but diffusely heterogeneous density and enhancement likely represents regions of necrosis.  This large heterogeneously enhancing soft tissue mass extends more anteriorly in the floor of the mouth on the left and measures at least 5.6 cm in greatest anterior to posterior dimension with 3.6 cm transverse dimension.  This does extend anteriorly to the medial margin of the body of the mandible on the left. There are post treatment changes with stranding in the neck fat.     1. Similar appearance of the neck vessels when compared to the prior CTA neck with mild narrowing at the origin of the left subclavian artery.  There is no critical stenosis, occlusion, thrombosis or dissection involving the cervical vertebral or carotid arteries. 2. Larger mass within the hypopharynx and tongue base extending anteriorly to the floor of the mouth on the left to the medial margin of the body of the mandible without obvious bony destruction. 3. There is nonocclusive stenosis of the distal V4 segment of the left vertebral artery without change. 4. There is no large vessel occlusion or critical stenosis of the anterior circulation. 5. There is developmental variation with fetal origin of the left posterior cerebral artery. Electronically signed by: Rex Joyner MD Date:    09/20/2022 Time:    11:31    CT Abdomen Pelvis With  Contrast    Result Date: 9/1/2022  CMS MANDATED QUALITY DATA - CT RADIATION - 436 All CT scans at this facility utilize dose modulation, iterative reconstruction, and/or weight based dosing when appropriate to reduce radiation dose to as low as reasonably achievable. Reason: abdominal pain partial history of laryngeal carcinoma TECHNIQUE: CT abdomen and pelvis with 100 mL Omnipaque 350. COMPARISON: PET/CT 5/13/2022 CT ABDOMEN: Coronary artery calcification and extremely tiny hiatal hernia incidentally noted. Visualized lung bases are clear. Liver, gallbladder, pancreas, spleen, adrenals, and kidneys are normal. Mild aortoiliac calcifications present. Gastrostomy tube tip lies in distal gastric body. Small intestines are unremarkable. Mild wall thickening affects the ascending colon with pneumatosis intestinalis diffusely affecting the ascending colon. No other free intraperitoneal gas or, and remainder of colon is normal. A normal appendix is present. The major mesenteric vascular structures are patent. No acute osseous abnormality. CT PELVIS: Prostate is slightly enlarged. Bladder is normal. No free pelvic fluid. Tiny right and small to moderate left fat-containing inguinal hernias are present. No acute osseous abnormality. IMPRESSION: 1. Pneumatosis intestinalis affecting the ascending colon with associated mild wall thickening. Etiology of this is undetermined. Potential considerations include intestinal ischemia or pneumatosis related to chemotherapy. Clinical and laboratory correlation is needed to assess significance. No portal venous gas or free intraperitoneal air however. 2. Coronary artery calcifications Electronically signed by:  Nael Hui MD  9/1/2022 6:20 PM CDT Workstation: [id number]    NM PET CT Routine Skull to Mid Thigh    Result Date: 9/27/2022  PET CT WITH IMAGE FUSION HISTORY:  restage tongue cancer RESTAGING...  HX: TONGUE CA.  DX: April 2022.  LAST CHEMO TREATMENT WAS 3WKS AGO.  EVALUATE  TX RESPONSE.   ALSO HX OF LARYNGEAL CA IN AUGUST 2020.  MULTIPLE SURGERIES: LARYNGECTOMY, THYROIDECTOMY & TRACHEOSTOMY.  RAD TX WAS COMPLETED IN NOV 2020. TECHNIQUE: Following IV administration of 11.2 mCi of F-18 labeled FDG into right antecubital fossa and a 60 minute delay, PET CT was performed from the vertex of the skull through the proximal thighs with an integrated PET CT scanner with image fusion. CT images were obtained to aid in attenuation correction and PET localization. The patient's serum glucose at the time of the exam was 136 mg/dL. COMPARISON: PET/CT 5/13/2022 FINDINGS: Poorly characterized soft tissue mass involving base of tongue approximately measures 4.3 x 2.8 cm (series 3 image 65) appearing similar to the prior exam. This reaches current max SUV of 11.8, not significantly changed from prior of 11.4. No other abnormal FDG activity. Intracranial compartment is unremarkable. Trace bilateral maxillary sinus mucosal thickening is present. Postoperative changes of laryngectomy and tracheostomy are present. Surgical clips occur throughout the neck. No definite enlarged cervical or supraclavicular lymph node, with evaluation limited by lack of IV contrast. Left subclavian port catheter tip terminates in SVC. Diffuse coronary artery calcifications are present. No enlarged mediastinal or axillary lymph nodes. No pulmonary nodule or mass. Gastrostomy tube tip lies in gastric body. Moderate aortoiliac calcifications are present. Small fat-containing left inguinal hernia incidentally noted. Mild degenerative changes affect the spine. No suspicious osseous abnormality. IMPRESSION: 1. No significant change in the FDG avid mass involving the tongue base, remaining characteristic of malignancy. 2. No new FDG avid malignancy or metastatic disease. Electronically signed by:  Nael Hui MD  9/27/2022 2:38 PM CDT Workstation: [id number]    CT Abdomen Pelvis  Without Contrast    Result Date: 9/4/2022  CMS  MANDATED QUALITY DATA - CT RADIATION  436 All CT scans at this facility utilize dose modulation, iterative reconstruction, and/or weight based dosing when appropriate to reduce radiation dose to as low as reasonably achievable. CT ABDOMEN PELVIS WITHOUT IV CONTRAST CLINICAL HISTORY: 72 years Male Follow-up pneumatosis COMPARISON: CT abdomen and pelvis September 1, 2022 FINDINGS: Imaging through the lower thorax demonstrates subsegmental atelectasis of the dependent lower lobes. Coronary artery calcification. Bone window images show no acute or aggressive osseous abnormality. Transitional lumbosacral vertebral body. On this unenhanced exam, no focal hepatic lesion. Gallbladder and biliary tree are unremarkable. Spleen appears normal. Pancreas is unremarkable. No adrenal lesion. No renal calculi or hydronephrosis. Ureters are normal in caliber. Urinary bladder is within normal limits. Gastrostomy tube is in place within the stomach. Stomach is largely collapsed. No evidence of small bowel obstruction. Pneumatosis and pericolonic abscess involving the ascending colon is redemonstrated, slightly diminished compared to prior. Mild wall thickening throughout the colon. No free fluid or free air within the abdomen or pelvis. Aortoiliac atherosclerotic calcification. No pathologically enlarged lymph nodes within the abdomen or pelvis. Bilateral fat-containing inguinal hernias, larger on the left. IMPRESSION: Pneumatosis and pericolonic gas about the ascending colon has decreased in volume compared to prior. Persistent colonic wall thickening which could indicate colitis. Coronary and aortic atherosclerosis. Gastrostomy tube is within the stomach. Bilateral inguinal hernias. Electronically signed by:  Jose De Jesus Oleary MD  9/4/2022 4:06 PM CDT Workstation: [id number]    CTA neck  5/20/2022:     IMPRESSION:  Persistent mass within the hypopharynx consistent with malignancy.  By my measurements it is larger than on April 24, 2022  with central necrosis.  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 This is similar to the previous April 2022 study.  No evidence of arterial compromise related to malignancy.     PET 5/13/2022:     IMPRESSION:  1. Postoperative changes of prior laryngectomy and lymph node resection/radiation.  2. Masslike FDG avid lesion to the left of midline at the tongue base concerning for residual/recurrent disease.  3. Adjacent confluent nodular extension along the inferior left side of the mass.  4. No FDG avid lymphadenopathy or convincing additional sites of disease in the chest, abdomen or pelvis.     CT head 5/10/2022:  FINDINGS: Comparison to multiple prior exams. There is no acute intracranial hemorrhage, with no mass effect or abnormal extra-axial fluid. Mild scattered areas of nonspecific hypoattenuation involve the deep periventricular white matter, with gray-white differentiation maintained.     There is mild generalized prominence of the cortical sulci and ventricles. The cerebellum and brainstem are unremarkable. There are carotid siphon vascular calcifications. The visualized paranasal sinuses and mastoid air cells are clear. There is no acute calvarial fracture or scalp hematoma.     IMPRESSION: No acute intracranial hemorrhage or acute calvarial fracture     CT soft neck 4/24/2022;     Oral tongue/tongue base:  At the base of the tongue on the left there are findings of a heterogeneously enhancing mass measuring 2.1 cm highly concerning for a neoplastic process.     True and false cords:  Patient status post laryngectomy which has been performed in the interim. Soft tissue stranding in the lower neck likely related to a previous flat reconstruction. No enhancement or lymphadenopathy within the lower neck.     Lymph node assessment:Negative     Surrounding soft tissues:  Negative     Vasculature:  Negative     Osseous structures:  Negative     Lung  apices:  Scarring involving the lung apices bilaterally likely related to previous radiation.     IMPRESSION:  1. Postoperative and radiation changes involving the lower neck.  2. Findings highly concerning for a developing mass at the left base of the tongue enhancing 2.1 cm region. Direct visualization in this region would be of benefit.        CT soft neck  12/24/2021  IMPRESSION:  1.  Laryngeal mass as on prior exam. Laryngeal airway narrowing has not changed.  2.  No evidence for active hemorrhage.  3.  Partially visualized patchy groundglass infiltrates in both upper lobes. Also see CT chest report     CTA Chest 12/24/2021:  IMPRESSION:     1.  No pulmonary embolism.     2.  Soft tissue stranding in the region of the strap musculature with ill-defined hypodensity measuring 2.8 x 1.4 cm in the cervical midline.  Findings concerning for infectious process or abscess. Neoplastic process could have similar imaging characteristics.     3.  Suggested erosion of the proximal right parasymphyseal aspect of the thyroid shield.  Correlated with CT soft tissue neck findings. Findings could represent osteomyelitis. Neoplastic process cannot be excluded.     4.  Hepatic steatosis.  5.  Thickening of the gastric wall. Prominence of perigastric vasculature. Small hiatal hernia.     6.  Moderate stool burden.  Correlate for constipation.        PET 12/15/2021:  IMPRESSION:     Substantial qualitative and quantitative increase of hypermetabolic FDG activity associated with the larynx in this patient with known supraglottic neoplasm.     No evidence of FDG avid metastatic disease involving neck, chest, abdomen or pelvis.     PET 8/21/2020:     Impression:     1. Intensely hypermetabolic plaque-like mass along the left true vocal cord, compatible with known laryngeal carcinoma.  2. No findings of regional metastatic disease in the neck, or distant metastatic disease.        CT Chest 8/10/2020:     IMPRESSION:     No metastatic  disease within the chest.  Three-vessel coronary artery calcification. Nondilated cardiac  chambers     CT Soft neck 7/22/2020:  IMPRESSION:  1. Slight interval increase in size of the previously described  enhancing nodule along the anterior left vocal cord.  2. No pathologic lymphadenopathy.  3. Additional and incidental findings as noted above.     CT Soft neck  3/16/2020:     Impression:     6 mm enhancing focus involving the superior aspect of the left focal cord near the anterior commisure.  Recommend direct visualization for further evaluation of laryngeal polyp     Question of 2 mm submucosal lipoma involving the midportion of the superior aspect of the left vocal cord.     Mild arteriosclerosis involving the brachiocephalic arteries and carotid arteries     Mild degenerative change of the cervical spine        I have reviewed all available lab results and radiology reports.    Assessment/Plan:   (1) 72 y.o. 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     - Patient has been under the care of Dr Khoobehi with [facility] and Dr Portillo with rad/onc.   - He was recently found to have a new primary cancer involving the base of his tongue in April 2022. He was deemed not to be a candidate for any further radiation and did not want to undergo any further surgery as this would necessitate a glossectomy procedure.   - He has been on adjuvant chemotherapy per direction of Dr Khoobehi and has had 3 cycles. His therapy course has been complicated with persistent diarrhea and N/V.      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 He saw Dr Lucero with Rad/onc this am. They are going to present his case to H&N Tumor Board at [facility] and plans to see Dr James about surgical options. If he is not a surgical candidate then will proceed with cisplatin and XRT combine therapy.     10/6/2022:  - He saw Dr Lucero with Rad/onc, and Dr James and his case was presented to H&N Tumor Board at [facility] and  he general consensus was to proceed to surgery.  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   - He had his portacath removed on 11/28.   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, keratinizing type, moderate to poorly differentiated tumor  - Four LN's were all negative  - he did have a positive left superior pharyngeal margin  - pT3 pN0  - reviewed the latest NCCN guidelines again from Version 1.2023; he may need some further systemic therapy due to the margin  - check on Rad/onc follow-up     1/23/2023:  - s/p new portacath placed on 1/12/2023 with Dr Le  - starting combined chemotherapy and XRT - cisplatin  - s/p chemotherapy school with Whit    2/6/2023:  - he seems to be tolerating the chemotherapy well at this time  - continued with concomitant therapy with XRT         (2) Hx/of Laryngeal cancer in Aug 2020 stage II (cT2 N0)  - s/p radiation followed by bilateral neck dissection and total thyroidectomy     Brief Oncology Summary for his  laryngeal CA hx:     Patient was noted to initially have a suspicious lesion on the left true vocal cord by laryngoscopy with Dr Xiao in March 2020 with biopsy showing severe dysplastic changes without definitive invasive process. He had a CT scan on 3/16/2020 which showed a 6mm nodule on the left vocal cord. A repeat Ct scan four months later on 7/22/2020 showed the nodule enlarged to 1.4 x 1.1 cm in size. Patient was then seen by Dr Noel and underwent repeat scope in Aug 2020 who found a bulky deeply invading tumor which was debulked and the pathology coming back moderately differentiated SCCA without lymphovascular or perineural invasion. Hs case was subsequently presented to the tumor board and the consensus was to proceed with radiation. He completed XRT on 10/30/2020 and proceeded with regular laryngoscopy surveillance. He eventually required salvage laryngectomy and neck dissection with flap with Dr James on Jan 6th 2022. 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 Fifty lymph nodes were removed which were all negative. Pathology TNM was pT4a, pN0. Patient did not require any adjuvant therapy afterwards.      (2) HTN and hypercholesterolemia     (3) Paroxysmal atrial fibrillation, V tach     (4) DM II     (5) B12 deficiency      (6) Fatty liver disease, GERD           VISIT DIAGNOSES:      Vitamin B12 deficiency    Iron deficiency anemia due to chronic blood loss    Laryngeal cancer    Larynx cancer    Malignant neoplasm of base of tongue    Chemotherapy-induced neutropenia    Dehydration    Abnormal CT scan, neck          PLAN:  Continued with systemic concomitant therapy with Cisplat and XRT    s/p chemotherapy school with Briana - discussed the side-effect profile, provided literature and obtained consents  F/u Rad/onc follow-up as directed  F/u with Dr James with ENT and  Dr Lucero with rad/onc  Check labs weekly  F/u with PCP, ENT, etc  Dietician f/u on the tube feeds     RTC in 1 week with Whit and 2 weeks with myself  Fax note to  Bandar/Dameon Lucero Hasney, Yesenia Gomez       Discussion:     COVID-19 Discussion:     I had long discussion with patient and any applicable family about the COVID-19 coronavirus epidemic and the recommended precautions with regard to cancer and/or hematology patients. I have re-iterated the CDC recommendations for adequate hand washing, use of hand -like products, and coughing into elbow, etc. In addition, especially for our patients who are on chemotherapy and/or our otherwise immunocompromised patients, I have recommended avoidance of crowds, including movie theaters, restaurants, churches, etc. I have recommended avoidance of any sick or symptomatic family members and/or friends. I have also recommended avoidance of any raw and unwashed food products, and general avoidance of food items that have not been prepared by themselves. The patient has been asked to call us immediately with any symptom developments, issues, questions or other general concerns.         Pathology Discussion:     I reviewed and discussed the pathology report(s) and radiograph reports (if available) in as simple to understand and/or laymen's terms to the best of my ability. I had an indepth conversation with the patient and went over the patient's individual diagnosis based on the information that was currently available. I discussed the TNM staging process with regard to the patient's particular cancer type, and the calculated stage based on the currently available TNM data and literature. I discussed the available prognostic data with regard to the current staging information and how it relates to the prognosis of their particular neoplastic process.          NCCN Guidelines:     I discussed the available treatment option(s) in accordance with the  "latest literature from the NCCN Clinical Practice Guidelines for the patient's particular type of cancer disorder. The NCCN Guidelines provide a "document evidence-based (and) consensus-driven management" of the care of oncology patients. The treatment recommendations were made not only in accordance to the NCCN guidelines, but also factored in to account the patient's overall age, condition, performance status and their medical co-morbidities. I went over the risks and benefits of the the treatment options (if any could be made) with regard to their particular cancer type, their cancer stage, their age, and their co-morbidities.         Chemotherapy Discussion:      I discussed the available treatment option(s) in accordance with the latest/current national evidence-based guidelines (NCCN, UpToDate, NCI, ASCO, etc where applicable), their overall age/condition and their co-morbidities. I also went over the risks and benefits of the chemotherapy with regard to their particular cancer type, their cancer stage, their age/condition, and their co-morbidities. I provided literature on the chemotherapy regimen and discussed the chemotherapy side-effect profiles of the drug(s). I discussed the importance of compliance with obtaining and monitoring weekly lab work, and went over the potential hematopathology issues and risks with anemia, leucopenia and thrombocytopenia that can occur with chemotherapy. I discussed the potential risks of liver and kidney damage, which could be permanent and could necessitate dialysis long-term if kidney failure developed. I discussed the emetic and/or diarrheal potential of the regimen and the potential need for use of antiemetic and anti-diarrheal medications. I discussed the risk for development of anaphylactic shock, bronchospasm, dysrhythmia, and respiratory/cardiovascular arrest and/or failure. I discussed the potential risks for development of alopecia, cold sensory issues, ringing in " ears, vertigo, cataracts, glaucoma, and neuropathy, all of which could end up being chronic and life-long. Some chemotherpyI discussed the risks of hand-foot syndrome and rashes, and development of other autoimmune mediated processes such as pneumonitis, hepatitis, and colitis which could be life threatening. I discussed the risks of the potential development of a rare but fatal viral mediated disease known as PML (Progressive Multifocal Leukoencephalopathy), and risk of future development of leukemia and/or lymphoma from use of certain chemotherapy agents. I discussed the need for neutropenic precautions, basic hygiene/sanitation behaviors and dietary restrictions.    The patient's consent has been obtained to proceed with the chemotherapy.The patient will be referred to Chemotherapy School /[facility] for training and education on chemotherapy, use of antiemetics and/or anti-diarrheals, use of NSAID's, potential chemotherapy side-effects, and any specific recommendations and precautions with the particular chemotherapy agents.      I answered all of the patient's (and family's, if applicable) questions to the best of my ability and to their complete satisfaction. The patient acknowledged full understanding of the risks, recommendations and plan(s).     I have explained and the patient understands all of  the current recommendation(s). I have answered all of their questions to the best of my ability and to their complete satisfaction.         I spent over 25 mins of time with the patient. Reviewed results of the recently ordered labs, tests and studies; made directives with regards to the results. Over half of this time was spent couseling and coordinating care.    I have explained all of the above in detail and the patient understands all of the current recommendation(s). I have answered all of their questions to the best of my ability and to their complete satisfaction.   The patient is to continue with  the current management plan.            Electronically signed by Venu Tamez MD                 Answers submitted by the patient for this visit:  Review of Systems Questionnaire (Submitted on 1/20/2023)  appetite change : No  unexpected weight change: No  mouth sores: No  visual disturbance: Yes  cough: No  shortness of breath: No  chest pain: No  abdominal pain: No  diarrhea: No  frequency: Yes  back pain: No  rash: No  headaches: No  adenopathy: No  nervous/ anxious: Yes

## 2023-02-03 ENCOUNTER — LAB VISIT (OUTPATIENT)
Dept: LAB | Facility: HOSPITAL | Age: 73
End: 2023-02-03
Attending: NURSE PRACTITIONER
Payer: MEDICARE

## 2023-02-03 DIAGNOSIS — E83.51 HYPOCALCEMIA: ICD-10-CM

## 2023-02-03 DIAGNOSIS — C01 MALIGNANT NEOPLASM OF BASE OF TONGUE: ICD-10-CM

## 2023-02-03 LAB
ALBUMIN SERPL BCP-MCNC: 4.4 G/DL (ref 3.5–5.2)
ALP SERPL-CCNC: 476 U/L (ref 55–135)
ALT SERPL W/O P-5'-P-CCNC: 149 U/L (ref 10–44)
ANION GAP SERPL CALC-SCNC: 12 MMOL/L (ref 8–16)
AST SERPL-CCNC: 96 U/L (ref 10–40)
BASOPHILS # BLD AUTO: 0.02 K/UL (ref 0–0.2)
BASOPHILS NFR BLD: 0.4 % (ref 0–1.9)
BILIRUB SERPL-MCNC: 1.1 MG/DL (ref 0.1–1)
BUN SERPL-MCNC: 18 MG/DL (ref 8–23)
CA-I BLDV-SCNC: 1.12 MMOL/L (ref 1.06–1.42)
CALCIUM SERPL-MCNC: 8.3 MG/DL (ref 8.7–10.5)
CHLORIDE SERPL-SCNC: 101 MMOL/L (ref 95–110)
CO2 SERPL-SCNC: 25 MMOL/L (ref 23–29)
CREAT SERPL-MCNC: 0.9 MG/DL (ref 0.5–1.4)
DIFFERENTIAL METHOD: ABNORMAL
EOSINOPHIL # BLD AUTO: 0.1 K/UL (ref 0–0.5)
EOSINOPHIL NFR BLD: 1.1 % (ref 0–8)
ERYTHROCYTE [DISTWIDTH] IN BLOOD BY AUTOMATED COUNT: 14 % (ref 11.5–14.5)
EST. GFR  (NO RACE VARIABLE): >60 ML/MIN/1.73 M^2
GLUCOSE SERPL-MCNC: 108 MG/DL (ref 70–110)
HCT VFR BLD AUTO: 33.5 % (ref 40–54)
HGB BLD-MCNC: 11 G/DL (ref 14–18)
IMM GRANULOCYTES # BLD AUTO: 0.06 K/UL (ref 0–0.04)
IMM GRANULOCYTES NFR BLD AUTO: 1.1 % (ref 0–0.5)
LYMPHOCYTES # BLD AUTO: 0.9 K/UL (ref 1–4.8)
LYMPHOCYTES NFR BLD: 16.2 % (ref 18–48)
MCH RBC QN AUTO: 31.2 PG (ref 27–31)
MCHC RBC AUTO-ENTMCNC: 32.8 G/DL (ref 32–36)
MCV RBC AUTO: 95 FL (ref 82–98)
MONOCYTES # BLD AUTO: 0.3 K/UL (ref 0.3–1)
MONOCYTES NFR BLD: 6 % (ref 4–15)
NEUTROPHILS # BLD AUTO: 4 K/UL (ref 1.8–7.7)
NEUTROPHILS NFR BLD: 75.2 % (ref 38–73)
NRBC BLD-RTO: 0 /100 WBC
PLATELET # BLD AUTO: 204 K/UL (ref 150–450)
PMV BLD AUTO: 10.3 FL (ref 9.2–12.9)
POTASSIUM SERPL-SCNC: 3.9 MMOL/L (ref 3.5–5.1)
PROT SERPL-MCNC: 8 G/DL (ref 6–8.4)
RBC # BLD AUTO: 3.53 M/UL (ref 4.6–6.2)
SODIUM SERPL-SCNC: 138 MMOL/L (ref 136–145)
WBC # BLD AUTO: 5.3 K/UL (ref 3.9–12.7)

## 2023-02-03 PROCEDURE — 80053 COMPREHEN METABOLIC PANEL: CPT | Performed by: NURSE PRACTITIONER

## 2023-02-03 PROCEDURE — 85025 COMPLETE CBC W/AUTO DIFF WBC: CPT | Performed by: NURSE PRACTITIONER

## 2023-02-03 PROCEDURE — 82330 ASSAY OF CALCIUM: CPT | Performed by: NURSE PRACTITIONER

## 2023-02-03 PROCEDURE — 36415 COLL VENOUS BLD VENIPUNCTURE: CPT | Performed by: NURSE PRACTITIONER

## 2023-02-05 RX ORDER — SODIUM CHLORIDE 0.9 % (FLUSH) 0.9 %
10 SYRINGE (ML) INJECTION
Status: CANCELLED | OUTPATIENT
Start: 2023-02-06

## 2023-02-05 RX ORDER — HEPARIN 100 UNIT/ML
500 SYRINGE INTRAVENOUS
Status: CANCELLED | OUTPATIENT
Start: 2023-02-06

## 2023-02-06 ENCOUNTER — OFFICE VISIT (OUTPATIENT)
Dept: HEMATOLOGY/ONCOLOGY | Facility: CLINIC | Age: 73
End: 2023-02-06
Payer: MEDICARE

## 2023-02-06 ENCOUNTER — DOCUMENTATION ONLY (OUTPATIENT)
Dept: HEMATOLOGY/ONCOLOGY | Facility: CLINIC | Age: 73
End: 2023-02-06

## 2023-02-06 ENCOUNTER — INFUSION (OUTPATIENT)
Dept: INFUSION THERAPY | Facility: HOSPITAL | Age: 73
End: 2023-02-06
Attending: INTERNAL MEDICINE
Payer: MEDICARE

## 2023-02-06 VITALS
DIASTOLIC BLOOD PRESSURE: 61 MMHG | OXYGEN SATURATION: 100 % | BODY MASS INDEX: 20.12 KG/M2 | HEART RATE: 80 BPM | SYSTOLIC BLOOD PRESSURE: 115 MMHG | WEIGHT: 125.19 LBS | TEMPERATURE: 98 F | RESPIRATION RATE: 18 BRPM | HEIGHT: 66 IN

## 2023-02-06 VITALS
DIASTOLIC BLOOD PRESSURE: 55 MMHG | TEMPERATURE: 98 F | OXYGEN SATURATION: 100 % | HEIGHT: 66 IN | HEART RATE: 80 BPM | BODY MASS INDEX: 20.12 KG/M2 | SYSTOLIC BLOOD PRESSURE: 99 MMHG | RESPIRATION RATE: 18 BRPM | WEIGHT: 125.19 LBS

## 2023-02-06 DIAGNOSIS — D50.0 IRON DEFICIENCY ANEMIA DUE TO CHRONIC BLOOD LOSS: ICD-10-CM

## 2023-02-06 DIAGNOSIS — C01 MALIGNANT NEOPLASM OF BASE OF TONGUE: Primary | ICD-10-CM

## 2023-02-06 DIAGNOSIS — C32.9 LARYNX CANCER: ICD-10-CM

## 2023-02-06 DIAGNOSIS — R93.89 ABNORMAL CT SCAN, NECK: ICD-10-CM

## 2023-02-06 DIAGNOSIS — T45.1X5A CHEMOTHERAPY-INDUCED NEUTROPENIA: ICD-10-CM

## 2023-02-06 DIAGNOSIS — E53.8 VITAMIN B12 DEFICIENCY: Primary | ICD-10-CM

## 2023-02-06 DIAGNOSIS — C32.9 LARYNGEAL CANCER: ICD-10-CM

## 2023-02-06 DIAGNOSIS — E86.0 DEHYDRATION: ICD-10-CM

## 2023-02-06 DIAGNOSIS — D70.1 CHEMOTHERAPY-INDUCED NEUTROPENIA: ICD-10-CM

## 2023-02-06 DIAGNOSIS — C01 MALIGNANT NEOPLASM OF BASE OF TONGUE: ICD-10-CM

## 2023-02-06 PROCEDURE — A4216 STERILE WATER/SALINE, 10 ML: HCPCS | Performed by: INTERNAL MEDICINE

## 2023-02-06 PROCEDURE — 1160F PR REVIEW ALL MEDS BY PRESCRIBER/CLIN PHARMACIST DOCUMENTED: ICD-10-PCS | Mod: CPTII,S$GLB,, | Performed by: INTERNAL MEDICINE

## 2023-02-06 PROCEDURE — 1160F RVW MEDS BY RX/DR IN RCRD: CPT | Mod: CPTII,S$GLB,, | Performed by: INTERNAL MEDICINE

## 2023-02-06 PROCEDURE — 3008F PR BODY MASS INDEX (BMI) DOCUMENTED: ICD-10-PCS | Mod: CPTII,S$GLB,, | Performed by: INTERNAL MEDICINE

## 2023-02-06 PROCEDURE — 99214 OFFICE O/P EST MOD 30 MIN: CPT | Mod: S$GLB,,, | Performed by: INTERNAL MEDICINE

## 2023-02-06 PROCEDURE — 99214 PR OFFICE/OUTPT VISIT, EST, LEVL IV, 30-39 MIN: ICD-10-PCS | Mod: S$GLB,,, | Performed by: INTERNAL MEDICINE

## 2023-02-06 PROCEDURE — 63600175 PHARM REV CODE 636 W HCPCS: Performed by: INTERNAL MEDICINE

## 2023-02-06 PROCEDURE — 96366 THER/PROPH/DIAG IV INF ADDON: CPT

## 2023-02-06 PROCEDURE — 1126F PR PAIN SEVERITY QUANTIFIED, NO PAIN PRESENT: ICD-10-PCS | Mod: CPTII,S$GLB,, | Performed by: INTERNAL MEDICINE

## 2023-02-06 PROCEDURE — 1126F AMNT PAIN NOTED NONE PRSNT: CPT | Mod: CPTII,S$GLB,, | Performed by: INTERNAL MEDICINE

## 2023-02-06 PROCEDURE — 3078F PR MOST RECENT DIASTOLIC BLOOD PRESSURE < 80 MM HG: ICD-10-PCS | Mod: CPTII,S$GLB,, | Performed by: INTERNAL MEDICINE

## 2023-02-06 PROCEDURE — 3074F SYST BP LT 130 MM HG: CPT | Mod: CPTII,S$GLB,, | Performed by: INTERNAL MEDICINE

## 2023-02-06 PROCEDURE — 3078F DIAST BP <80 MM HG: CPT | Mod: CPTII,S$GLB,, | Performed by: INTERNAL MEDICINE

## 2023-02-06 PROCEDURE — 96375 TX/PRO/DX INJ NEW DRUG ADDON: CPT

## 2023-02-06 PROCEDURE — 1159F PR MEDICATION LIST DOCUMENTED IN MEDICAL RECORD: ICD-10-PCS | Mod: CPTII,S$GLB,, | Performed by: INTERNAL MEDICINE

## 2023-02-06 PROCEDURE — 3008F BODY MASS INDEX DOCD: CPT | Mod: CPTII,S$GLB,, | Performed by: INTERNAL MEDICINE

## 2023-02-06 PROCEDURE — 96413 CHEMO IV INFUSION 1 HR: CPT

## 2023-02-06 PROCEDURE — 96367 TX/PROPH/DG ADDL SEQ IV INF: CPT

## 2023-02-06 PROCEDURE — 1159F MED LIST DOCD IN RCRD: CPT | Mod: CPTII,S$GLB,, | Performed by: INTERNAL MEDICINE

## 2023-02-06 PROCEDURE — 96361 HYDRATE IV INFUSION ADD-ON: CPT

## 2023-02-06 PROCEDURE — 3074F PR MOST RECENT SYSTOLIC BLOOD PRESSURE < 130 MM HG: ICD-10-PCS | Mod: CPTII,S$GLB,, | Performed by: INTERNAL MEDICINE

## 2023-02-06 PROCEDURE — 25000003 PHARM REV CODE 250: Performed by: INTERNAL MEDICINE

## 2023-02-06 RX ORDER — HEPARIN 100 UNIT/ML
500 SYRINGE INTRAVENOUS
Status: DISCONTINUED | OUTPATIENT
Start: 2023-02-06 | End: 2023-02-06 | Stop reason: HOSPADM

## 2023-02-06 RX ORDER — SODIUM CHLORIDE 0.9 % (FLUSH) 0.9 %
10 SYRINGE (ML) INJECTION
Status: DISCONTINUED | OUTPATIENT
Start: 2023-02-06 | End: 2023-02-06 | Stop reason: HOSPADM

## 2023-02-06 RX ADMIN — PALONOSETRON HYDROCHLORIDE 0.25 MG: 0.25 INJECTION INTRAVENOUS at 09:02

## 2023-02-06 RX ADMIN — HEPARIN 500 UNITS: 100 SYRINGE at 12:02

## 2023-02-06 RX ADMIN — SODIUM CHLORIDE, PRESERVATIVE FREE 10 ML: 5 INJECTION INTRAVENOUS at 12:02

## 2023-02-06 RX ADMIN — CISPLATIN 66 MG: 1 INJECTION, SOLUTION INTRAVENOUS at 10:02

## 2023-02-06 RX ADMIN — POTASSIUM CHLORIDE 150 ML/HR: 2 INJECTION, SOLUTION, CONCENTRATE INTRAVENOUS at 07:02

## 2023-02-06 RX ADMIN — SODIUM CHLORIDE 1000 ML: 0.9 INJECTION, SOLUTION INTRAVENOUS at 10:02

## 2023-02-06 RX ADMIN — APREPITANT 130 MG: 130 INJECTION, EMULSION INTRAVENOUS at 09:02

## 2023-02-06 NOTE — PLAN OF CARE
Problem: Fall Injury Risk  Goal: Absence of Fall and Fall-Related Injury  Outcome: Ongoing, Progressing  Intervention: Identify and Manage Contributors  Flowsheets (Taken 2/6/2023 0709)  Self-Care Promotion: independence encouraged  Medication Review/Management: medications reviewed  Intervention: Promote Injury-Free Environment  Flowsheets (Taken 2/6/2023 0709)  Safety Promotion/Fall Prevention: medications reviewed

## 2023-02-06 NOTE — PROGRESS NOTES
Medical Nutrition Therapy Oncology Progress Note      Patient's PCP:Maico Patterson MD  Referring Provider: No ref. provider found  Subjective:        Patient ID: Cyrus Batres Jr. is a 72 y.o. male.    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    PEG (percutaneous endoscopic gastrostomy) adjustment/replacement/removal 11/22/2022    Postoperative 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    Tongue cancer     Tracheostomy dependence     Type 2 diabetes mellitus, without long-term current use of insulin 11/22/2022       Past Surgical History:   Procedure Laterality Date    DIRECT LARYNGOBRONCHOSCOPY N/A 12/27/2021    Procedure: LARYNGOSCOPY, DIRECT, WITH BRONCHOSCOPY;  Surgeon: Flex Espinosa MD;  Location: [facility] OR [address] [place] [address];  Service: ENT;  Laterality: N/A;    DIRECT LARYNGOSCOPY  11/9/2022    Procedure: LARYNGOSCOPY, DIRECT;  Surgeon: Jesse James MD;  Location: [facility] OR [address] [place] [address];  Service: ENT;;    DISSECTION OF NECK Bilateral 1/6/2022    Procedure: DISSECTION, NECK;  Surgeon: Jesse James MD;  [facility];  Service: ENT;  Laterality: Bilateral;    DISSECTION OF NECK Bilateral 11/9/2022    Procedure: DISSECTION, NECK;  Surgeon: Jesse James MD;  Location: [facility] OR [address] [place] [address];  Service: ENT;  Laterality: Bilateral;    FLAP PROCEDURE Right 1/6/2022    Procedure: CREATION, FREE FLAP;  Surgeon: Alise Hart MD;  Location: [facility] OR [address] [place] [address];  Service: ENT;  Laterality: Right;  Ischemic start 1351  Ischemic stop 1502    FLAP PROCEDURE Left 11/9/2022     Procedure: CREATION, FREE FLAP, ALT;  Surgeon: Alise Hart MD;  Location: [facility] OR 2ND FLR;  Service: ENT;  Laterality: Left;    GLOSSECTOMY Bilateral 11/9/2022    Procedure: TOTAL GLOSSECTOMY;  Surgeon: Jesse James MD;  Location: [facility] OR 2ND FLR;  Service: ENT;  Laterality: Bilateral;    INSERTION OF TUNNELED CENTRAL VENOUS CATHETER (CVC) WITH SUBCUTANEOUS PORT N/A 6/9/2022    Procedure: [id number];  Surgeon: Jesus Viera MD;  Location: [facility] OR;  Service: General;  Laterality: N/A;    INSERTION OF TUNNELED CENTRAL VENOUS CATHETER (CVC) WITH SUBCUTANEOUS PORT Right 1/12/2023    Procedure: [id number];  Surgeon: Abdulaziz Le Jr., MD;  Location: [facility] OR;  Service: General;  Laterality: Right;    LARYNGECTOMY N/A 1/6/2022    Procedure: LARYNGECTOMY;  Surgeon: Jesse James MD;  [facility];  Service: ENT;  Laterality: N/A;    LARYNGOSCOPY N/A 8/4/2020    Procedure: Suspension microlaryngoscopy with biopsy, possible KTP laser treatment/excision;  Surgeon: Stew Noel MD;  [facility];  Service: ENT;  Laterality: N/A;  Microscope, telescopes, tower, microinstruments, KTP laser, rep conf# [id number] IC 7/28.    LARYNGOSCOPY N/A 3/16/2021    Procedure: Suspension microlaryngoscopy with excision of lesion, possible CO2 laser;  Surgeon: Stew Noel MD;  [facility];  Service: ENT;  Laterality: N/A;  Microscope, telescopes, tower, microinstruments, CO2 laser, rep conf# [id number] IC 3/4.    LARYNGOSCOPY N/A 4/1/2021    Procedure: Suspension microlaryngoscopy with KTP laser excision of lesion;  Surgeon: Stew Noel MD;  [facility];  Service: ENT;  Laterality: N/A;  Microscope, telescopes, tower, microinstruments, 70 degree scope, vocal fold , KTP laser, rep conf# [id number] BC    LARYNGOSCOPY N/A 12/9/2021    Procedure: Suspension microlaryngoscopy with biopsy;  Surgeon: Stew Noel MD;   Location: [facility] OR 2ND FLR;  Service: ENT;  Laterality: N/A;  Microscope, telescopes, tower, microinstruments    LARYNGOSCOPY N/A 1/6/2022    Procedure: LARYNGOSCOPY;  Surgeon: Jesse James MD;  Location: [facility] OR 2ND FLR;  Service: ENT;  Laterality: N/A;    LARYNGOSCOPY N/A 4/27/2022    Procedure: LARYNGOSCOPY WITH BIOPSY;  Surgeon: Jesse James MD;  Location: [facility] OR 2ND FLR;  Service: ENT;  Laterality: N/A;    MICROLARYNGOSCOPY N/A 3/17/2020    Procedure: MICROLARYNGOSCOPY;  Surgeon: Jung Xiao MD;  Location: [facility] OR;  Service: ENT;  Laterality: N/A;  Laser Microlaryngoscopy  NEED TO SCHEDULE LASER from [facility] [id number]    PHARYNGECTOMY  11/9/2022    Procedure: TOTAL PHARYNGECTOMY;  Surgeon: Jesse James MD;  Location: [facility] OR 2ND FLR;  Service: ENT;;    REIMPLANTATION OF PARATHYROID TISSUE N/A 1/6/2022    Procedure: REIMPLANTATION, PARATHYROID TISSUE;  Surgeon: Jesse James MD;  Location: [facility] OR 2ND FLR;  Service: ENT;  Laterality: N/A;    SKIN SPLIT GRAFT Right 11/9/2022    Procedure: APPLICATION, GRAFT, SKIN, SPLIT-THICKNESS;  Surgeon: Jesse James MD;  Location: [facility] OR 2ND FLR;  Service: ENT;  Laterality: Right;    THYROIDECTOMY  1/6/2022    Procedure: THYROIDECTOMY;  Surgeon: Jesse James MD;  Location: [facility] OR [facility] FLR;  Service: ENT;;    TRACHEOSTOMY N/A 12/27/2021    Procedure: CREATION, TRACHEOSTOMY;  Surgeon: Flex Espinosa MD;  Location: [facility] OR 2ND FLR;  Service: ENT;  Laterality: N/A;       Social History     Socioeconomic History    Marital status:      Spouse name: Janel Batres    Number of children: 2   Occupational History    Occupation: [employer]     Employer: [employer]   Tobacco Use    Smoking status: Never    Smokeless tobacco: Never   Substance and Sexual Activity    Alcohol use: Not Currently     Comment: occasional    Drug use: No    Sexual activity: Yes     Partners: Female   Social History Narrative    ** Merged  "History Encounter **         2 children from his 1st wife     Social Determinants of Health     Financial Resource Strain: Low Risk     Difficulty of Paying Living Expenses: Not hard at all   Food Insecurity: No Food Insecurity    Worried About Running Out of Food in the Last Year: Never true    Ran Out of Food in the Last Year: Never true   Transportation Needs: No Transportation Needs    Lack of Transportation (Medical): No    Lack of Transportation (Non-Medical): No   Physical Activity: Insufficiently Active    Days of Exercise per Week: 1 day    Minutes of Exercise per Session: 30 min   Stress: Stress Concern Present    Feeling of Stress : To some extent   Social Connections: Socially Isolated    Frequency of Communication with Friends and Family: Once a week    Frequency of Social Gatherings with Friends and Family: Once a week    Attends Jain Services: Never    Active Member of Clubs or Organizations: No    Attends Club or Organization Meetings: Never    Marital Status:    Housing Stability: Low Risk     Unable to Pay for Housing in the Last Year: No    Number of Places Lived in the Last Year: 1    Unstable Housing in the Last Year: No       Family History   Problem Relation Age of Onset    Abnormal EKG Mother     Diabetes Father     Heart disease Father     Hypertension Father        Review of patient's allergies indicates:   Allergen Reactions    Lovastatin Itching    Pollen extracts     Lovastatin Rash     Not confirmed but pt skeptical       Current Outpatient Medications:     acetaminophen (TYLENOL) 325 MG tablet, Take 325 mg by mouth every 6 (six) hours as needed for Pain., Disp: , Rfl:     BD ULTRA-FINE YULISSA PEN NEEDLE 32 gauge x 5/32" Ndle, To be used with Novolog pen up to 4x a day, Disp: 100 each, Rfl: 3    calcitrioL (ROCALTROL) 1 mcg/mL solution, Take 0.25 mLs (0.25 mcg total) by Per G Tube route once daily., Disp: 15 mL, Rfl: 12    calcium carbonate (TUMS) 200 mg calcium (500 mg) " chewable tablet, 2 tablets (1,000 mg total) by Per G Tube route 5 (five) times daily., Disp: 300 tablet, Rfl: 11    calcium carbonate 500 mg/5 mL (1,250 mg/5 mL), Take 10 mLs (1,000 mg total) by Per G Tube route 3 (three) times daily with meals., Disp: 473 mL, Rfl: 12    esomeprazole (NEXIUM) 40 MG capsule, 40 mg before breakfast., Disp: , Rfl:     famotidine (PEPCID) 40 mg/5 mL (8 mg/mL) suspension, 2.5 mLs (20 mg total) by Per G Tube route 2 (two) times daily., Disp: 50 mL, Rfl: 11    ibuprofen (ADVIL,MOTRIN) 200 MG tablet, Take 200 mg by mouth every 6 (six) hours as needed for Pain., Disp: , Rfl:     insulin aspart U-100 (NOVOLOG FLEXPEN U-100 INSULIN) 100 unit/mL (3 mL) InPn pen, Inject 0-10 Units into the skin as needed; Add correction scale if needed while on TF.  Blood sugar 180-230 add 2 units, 231-280 +4 units, 281-330 +6 units, 331-380 +8 units, >380 +10 units.  MDD 40 units, Disp: 12 mL, Rfl: 0    lactose-reduced food with fibr (JEVITY 1.5 TRISHA) 0.06 gram-1.5 kcal/mL Liqd, 6 cartons per day via peg.  Flush 60ml before and after each bolus, Disp: 180 each, Rfl: 11    levoFLOXacin (LEVAQUIN) 250 mg/10 mL Soln, Take 30 mLs (750 mg total) by mouth once daily., Disp: 480 mL, Rfl: 0    levothyroxine (SYNTHROID) 100 MCG tablet, 1 tablet (100 mcg total) by Per G Tube route before breakfast., Disp: 30 tablet, Rfl: 11    losartan (COZAAR) 100 MG tablet, Take 0.5 tablets (50 mg total) by mouth once daily. (Patient taking differently: Take 50 mg by mouth every evening.), Disp: 45 tablet, Rfl: 3    metFORMIN (GLUCOPHAGE) 500 MG tablet, Take 1 tablet (500 mg total) by mouth 2 (two) times daily with meals., Disp: 60 tablet, Rfl: 3    ondansetron (ZOFRAN) 8 MG tablet, Take 1 tablet (8 mg total) by mouth every 8 (eight) hours as needed for Nausea., Disp: 30 tablet, Rfl: 2    promethazine (PHENERGAN) 25 MG tablet, Take 1 tablet (25 mg total) by mouth every 4 to 6 hours as needed for Nausea., Disp: 30 tablet, Rfl: 2     traZODone (DESYREL) 50 MG tablet, TAKE 1 TABLET BY MOUTH NIGHTLY AS NEEDED FOR INSOMNIA., Disp: 90 tablet, Rfl: 1    zolpidem (AMBIEN) 5 MG Tab, 1 tablet (5 mg total) by Per G Tube route nightly as needed (difficult with sleep)., Disp: 30 tablet, Rfl: 0  No current facility-administered medications for this visit.    Facility-Administered Medications Ordered in Other Visits:     heparin, porcine (PF) 100 unit/mL injection flush 500 Units, 500 Units, Intravenous, PRN, Venu Tamez MD, 500 Units at 02/06/23 1208    sodium chloride 0.9% flush 10 mL, 10 mL, Intravenous, PRN, Venu Tamez MD, 10 mL at 02/06/23 1208    All medications and past history have been reviewed.    OP HEAD NECK CISPLATIN WEEKLY + RADIOTHERAPY      Treatment Goal:   Curative      Status:   Active      Start Date:   1/23/2023      End Date:   3/6/2023 (Planned)      Provider:   Venu Tamez MD      Chemotherapy:   CISplatin (Platinol) 40 mg/m2 = 66 mg in sodium chloride 0.9% 631 mL chemo infusion, 40 mg/m2 = 66 mg, Intravenous, Clinic/HOD 1 time, 3 of 7 cycles    Administration: 66 mg (1/23/2023), 66 mg (2/6/2023)      Objective:      Wt Readings from Last 20 Encounters:   02/06/23 56.8 kg (125 lb 3.2 oz)   02/06/23 56.8 kg (125 lb 3.2 oz)   01/31/23 58.7 kg (129 lb 6.6 oz)   01/30/23 56.4 kg (124 lb 7.2 oz)   01/30/23 56.4 kg (124 lb 7.2 oz)   01/27/23 58.5 kg (129 lb)   01/26/23 59 kg (130 lb 1.6 oz)   01/23/23 58.4 kg (128 lb 12 oz)   01/23/23 58.4 kg (128 lb 12 oz)   01/18/23 59.4 kg (130 lb 15.3 oz)   01/13/23 59.4 kg (130 lb 14.4 oz)   01/12/23 57.6 kg (127 lb)   01/09/23 57.7 kg (127 lb 3.3 oz)   01/03/23 58.5 kg (128 lb 15.5 oz)   01/03/23 57.7 kg (127 lb 4.8 oz)   12/27/22 58.9 kg (129 lb 12.8 oz)   12/16/22 59.1 kg (130 lb 4.7 oz)   11/25/22 62.1 kg (137 lb)   11/23/22 62.3 kg (137 lb 5.6 oz)   11/23/22 62.2 kg (137 lb 2 oz)       Last Labs:  Last Labs:  Glucose   Date Value Ref Range Status   02/03/2023 108 70 - 110  mg/dL Final   01/31/2023 88 70 - 110 mg/dL Final     BUN   Date Value Ref Range Status   02/03/2023 18 8 - 23 mg/dL Final   01/31/2023 16 8 - 23 mg/dL Final     Creatinine   Date Value Ref Range Status   02/03/2023 0.9 0.5 - 1.4 mg/dL Final   01/31/2023 0.8 0.5 - 1.4 mg/dL Final     Sodium   Date Value Ref Range Status   02/03/2023 138 136 - 145 mmol/L Final   01/31/2023 138 136 - 145 mmol/L Final     Potassium   Date Value Ref Range Status   02/03/2023 3.9 3.5 - 5.1 mmol/L Final   01/31/2023 4.1 3.5 - 5.1 mmol/L Final     Phosphorus   Date Value Ref Range Status   11/24/2022 2.4 (L) 2.7 - 4.5 mg/dL Final   11/23/2022 3.6 2.7 - 4.5 mg/dL Final     Calcium   Date Value Ref Range Status   02/03/2023 8.3 (L) 8.7 - 10.5 mg/dL Final   01/31/2023 7.7 (L) 8.7 - 10.5 mg/dL Final     Prealbumin   Date Value Ref Range Status   09/03/2022 19 (L) 20.0 - 43.0 mg/dL Final     Total Protein   Date Value Ref Range Status   02/03/2023 8.0 6.0 - 8.4 g/dL Final   01/31/2023 7.5 6.0 - 8.4 g/dL Final     Cholesterol   Date Value Ref Range Status   02/14/2022 144 120 - 199 mg/dL Final     Comment:     The National Cholesterol Education Program (NCEP) has set the  following guidelines (reference ranges) for Cholesterol:  Optimal.....................<200 mg/dL  Borderline High.............200-239 mg/dL  High........................> or = 240 mg/dL     11/18/2020 174 120 - 199 mg/dL Final     Comment:     The National Cholesterol Education Program (NCEP) has set the  following guidelines (reference ranges) for Cholesterol:  Optimal.....................<200 mg/dL  Borderline High.............200-239 mg/dL  High........................> or = 240 mg/dL       Hemoglobin A1C   Date Value Ref Range Status   11/22/2022 5.8 4.5 - 6.2 % Final     Comment:     According to ADA guidelines, hemoglobin A1C <7.0% represents  optimal control in non-pregnant diabetic patients.  Different  metrics may apply to specific populations.   Standards of Medical  Care in Diabetes - 2016.    For the purpose of screening for the presence of diabetes:  <5.7%     Consistent with the absence of diabetes  5.7-6.4%  Consistent with increasing risk for diabetes   (prediabetes)  >or=6.5%  Consistent with diabetes    Currently no consensus exists for use of hemoglobin A1C  for diagnosis of diabetes for children.     11/09/2022 5.7 (H) 4.0 - 5.6 % Final     Comment:     ADA Screening Guidelines:  5.7-6.4%  Consistent with prediabetes  >or=6.5%  Consistent with diabetes    High levels of fetal hemoglobin interfere with the HbA1C  assay. Heterozygous hemoglobin variants (HbS, HgC, etc)do  not significantly interfere with this assay.   However, presence of multiple variants may affect accuracy.       Hemoglobin   Date Value Ref Range Status   02/03/2023 11.0 (L) 14.0 - 18.0 g/dL Final   01/31/2023 11.4 (L) 14.0 - 18.0 g/dL Final     POC Hematocrit   Date Value Ref Range Status   11/09/2022 25 (L) 36 - 54 %PCV Final   11/09/2022 24 (L) 36 - 54 %PCV Final     Hematocrit   Date Value Ref Range Status   02/03/2023 33.5 (L) 40.0 - 54.0 % Final   01/31/2023 34.4 (L) 40.0 - 54.0 % Final     Iron   Date Value Ref Range Status   11/25/2022 30 (L) 45 - 160 ug/dL Final   09/04/2022 58 45 - 160 ug/dL Final     No components found for: FROLATE  Vit D, 25-Hydroxy   Date Value Ref Range Status   02/14/2022 37 30 - 96 ng/mL Final     Comment:     Vitamin D deficiency.........<10 ng/mL                              Vitamin D insufficiency......10-29 ng/mL       Vitamin D sufficiency........> or equal to 30 ng/mL  Vitamin D toxicity............>100 ng/mL     11/18/2020 38 30 - 96 ng/mL Final     Comment:     Vitamin D deficiency.........<10 ng/mL                              Vitamin D insufficiency......10-29 ng/mL       Vitamin D sufficiency........> or equal to 30 ng/mL  Vitamin D toxicity............>100 ng/mL       WBC   Date Value Ref Range Status   02/03/2023 5.30 3.90 - 12.70 K/uL Final   01/31/2023  5.34 3.90 - 12.70 K/uL Final       Assessment:     Nutrition/Diet History     Patient Reported Diet/Restrictions/Preferences: NPO  Food Allergies: NKFA  Factors Affecting Nutritional Intake: s/p laryngectomy, glossectomy    Estimated/Assessed Needs     Weight Used For Calorie Calculations: 62 kg (137 lb)  Energy Calorie Requirements (kcal): 7930-8751 kcal/day   Energy Need Method: 30-35 Kcal/kg  Protein Requirements:  g/day   Protein Need Method: 1.5-2.0 g/kg  Fluid Requirements: 2000 ml/day  Estimated Fluid Requirement Method: 1ml/kcal      Nutrition Support  Current EN: Jevity 1.5- 6 cartons per day with 60ml FWF before and after each bolus. Provides 2130 calories, 90g protein and 1800ml FW. Recommend extra 60ml FWF TID to meet estimated fluid needs.    NEW EN RECOMMENDATIONS: [employer] 1.4- 5 cartons per day (1625ml total). Bolus 1 carton (325ml) 6x/d with 60ml FWF before and after each bolus. TF and FWF provides 2275 calories, 100g protein and 1770ml FW. Recommend extra 60ml FWF QID to meet estimated fluid needs. Recommended Prostat- 1 oz daily via peg to provide extra 15g protein to meet higher range of protein needs daily.     Evaluation of Received Nutrient/Fluid Intake     Calorie Intake: meeting needs  Protein Intake: meeting needs  Fluid Intake: meeting needs  Tolerance: tolerating  % Intake of Estimated Energy Needs: 100 % via peg      Nutrition Diagnosis Related to (Etiology) As Evidenced By (Signs/Symptoms)   Unintended weight loss Physiological causes increasing nutrient needs BoT cancer, scheduled surgery, Estimated intake of protein insufficient to meet requirements     RD Notes  Mr. Cyrus Batres is a 73y/o male with 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. Pt is schedule for glossectomy surgery for his base of tongue cancer in 1 month. He is currently NPO and recently switch to Jevity 1.5 and is tolerating 6 cartons per day without associated N/V/D, gas or  bloating. He does have chronic diarrhea not associated with feeding times. He present today with questions regarding how he can increase his nutrition to prepare for surgery next month. He reports he would like to switch to FatTail to promote regular BMs and help control BG. He reports recently weight gain of 7-8# since increasing his TF to 6 cartons per day. CW: 137#    1/23/22: RD met with Mr Medardo myrick today for nutrition follow up. Pt will be starting new treatment with cisplatin and concurrent XRT s/p extensive resection last month. Pt denies having any current problems with peg feedings. He has good knowledge of nutrition related side effects of treatment. Denies N/V/D/C. BMs normal. CW: 128#    1/30: Pt reports he continues to bolus 5 cartons of Jevity per day without s/s of intolerance. He is working himself up to 6 cartons per day due to increased needs during CCXRT. He denies N/V/D/C. He reports good hydration via peg. Noted 43 weight loss in 1 week but pt reports he was wearing a heavy coat last week when his weight was taken. CW: 124#    2/6: Pt reports good tolerance to treatment so far. Denies any N/V/D??C, gas or bloating with EN. Continues to work himself up to 6 cartons per day with extra fluids via peg. He denies having any nutrition related questions or concerns at the current time. CW: 125#    Nutrition Intervention:      Nutrition Intervention Enteral Nutrition  Composition   Goals/Expected Outcomes Continue FatTail 1.4- 6 cartons per day to meet estimated nutritional needs and promote regular BMs   Progress Progressing towards goal     Nutrition Intervention Fluid-modified diet   Goals/Expected Outcomes Continue aggressive hydration via peg to prevent dehydration during therapy   Progress Initial     Plan  Continue EN at goal rate. Advance to 6 cartons per day as tolerated to prevent unintentional weight loss  Continue aggressive hydration via peg to prevent dehydration  Provided JAMIR  contact info. Encouraged pt to contact RD with questions or concerns    Monitoring/Evaluation:     Monitor: TF tolerance, weight, BMs    Next Visit: f/u weekly or as needed      I have explained and the patient understands all of  the current recommendation(s). I have answered all of their questions to the best of my ability and to their complete satisfaction.   The patient is to continue with the current management plan.    Electronically signed by: Barbara Zayas MBA, [place], MALLORY

## 2023-02-07 ENCOUNTER — OFFICE VISIT (OUTPATIENT)
Dept: SURGICAL ONCOLOGY | Facility: CLINIC | Age: 73
End: 2023-02-07
Payer: MEDICARE

## 2023-02-07 VITALS — HEIGHT: 66 IN | WEIGHT: 132.5 LBS | BODY MASS INDEX: 21.29 KG/M2

## 2023-02-07 DIAGNOSIS — C32.9 LARYNX CANCER: Primary | ICD-10-CM

## 2023-02-07 PROCEDURE — 1126F PR PAIN SEVERITY QUANTIFIED, NO PAIN PRESENT: ICD-10-PCS | Mod: CPTII,S$GLB,, | Performed by: OTOLARYNGOLOGY

## 2023-02-07 PROCEDURE — 99999 PR PBB SHADOW E&M-EST. PATIENT-LVL III: CPT | Mod: PBBFAC,,, | Performed by: OTOLARYNGOLOGY

## 2023-02-07 PROCEDURE — 1160F RVW MEDS BY RX/DR IN RCRD: CPT | Mod: CPTII,S$GLB,, | Performed by: OTOLARYNGOLOGY

## 2023-02-07 PROCEDURE — 1160F PR REVIEW ALL MEDS BY PRESCRIBER/CLIN PHARMACIST DOCUMENTED: ICD-10-PCS | Mod: CPTII,S$GLB,, | Performed by: OTOLARYNGOLOGY

## 2023-02-07 PROCEDURE — 99999 PR PBB SHADOW E&M-EST. PATIENT-LVL III: ICD-10-PCS | Mod: PBBFAC,,, | Performed by: OTOLARYNGOLOGY

## 2023-02-07 PROCEDURE — 3288F FALL RISK ASSESSMENT DOCD: CPT | Mod: CPTII,S$GLB,, | Performed by: OTOLARYNGOLOGY

## 2023-02-07 PROCEDURE — 1101F PR PT FALLS ASSESS DOC 0-1 FALLS W/OUT INJ PAST YR: ICD-10-PCS | Mod: CPTII,S$GLB,, | Performed by: OTOLARYNGOLOGY

## 2023-02-07 PROCEDURE — 1101F PT FALLS ASSESS-DOCD LE1/YR: CPT | Mod: CPTII,S$GLB,, | Performed by: OTOLARYNGOLOGY

## 2023-02-07 PROCEDURE — 99024 POSTOP FOLLOW-UP VISIT: CPT | Mod: S$GLB,,, | Performed by: OTOLARYNGOLOGY

## 2023-02-07 PROCEDURE — 1126F AMNT PAIN NOTED NONE PRSNT: CPT | Mod: CPTII,S$GLB,, | Performed by: OTOLARYNGOLOGY

## 2023-02-07 PROCEDURE — 3008F BODY MASS INDEX DOCD: CPT | Mod: CPTII,S$GLB,, | Performed by: OTOLARYNGOLOGY

## 2023-02-07 PROCEDURE — 1159F PR MEDICATION LIST DOCUMENTED IN MEDICAL RECORD: ICD-10-PCS | Mod: CPTII,S$GLB,, | Performed by: OTOLARYNGOLOGY

## 2023-02-07 PROCEDURE — 99024 PR POST-OP FOLLOW-UP VISIT: ICD-10-PCS | Mod: S$GLB,,, | Performed by: OTOLARYNGOLOGY

## 2023-02-07 PROCEDURE — 1159F MED LIST DOCD IN RCRD: CPT | Mod: CPTII,S$GLB,, | Performed by: OTOLARYNGOLOGY

## 2023-02-07 PROCEDURE — 3008F PR BODY MASS INDEX (BMI) DOCUMENTED: ICD-10-PCS | Mod: CPTII,S$GLB,, | Performed by: OTOLARYNGOLOGY

## 2023-02-07 PROCEDURE — 3288F PR FALLS RISK ASSESSMENT DOCUMENTED: ICD-10-PCS | Mod: CPTII,S$GLB,, | Performed by: OTOLARYNGOLOGY

## 2023-02-08 ENCOUNTER — OUTPATIENT CASE MANAGEMENT (OUTPATIENT)
Dept: ADMINISTRATIVE | Facility: OTHER | Age: 73
End: 2023-02-08
Payer: MEDICARE

## 2023-02-08 NOTE — PROGRESS NOTES
Outpatient Care Management  Plan of Care Follow Up Visit    Patient: Cyrus Batres Jr.  MRN: [id number]  Date of Service: 02/08/2023  Completed by: Eugenia Agudelo RN  Referral Date: 09/02/2022    Reason for Visit   Patient presents with    Update Plan Of Care       Brief Summary: see care plan   Next Steps: Follow up  on or around 03/01/23

## 2023-02-08 NOTE — PROGRESS NOTES
Chief Complaint   Patient presents with    Follow-up     Follow up Larynx cancer     Oncology History   Larynx cancer   8/4/2020 Initial Diagnosis    Larynx neoplasm malignant     8/6/2020 Tumor Conference    His case was discussed at the Multidisciplinary Head and Neck Team Planning Meeting.    Representatives from Medical Oncology, Radiation Oncology, Head and Neck Surgical Oncology, Psychosocial Oncology, and Speech and Language Pathology discussed the case with the following recommendations:    1) definitive radiation              8/6/2020 Cancer Staged    Staging form: Larynx - Glottis, AJCC 8th Edition  - Clinical stage from 8/6/2020: Stage II (cT2, cN0, cM0)       8/6/2020 Cancer Staged    Cancer Staging  Larynx neoplasm malignant  Staging form: Larynx - Glottis, AJCC 8th Edition  - Clinical stage from 8/6/2020: Stage II (cT2, cN0, cM0) - Signed by Fariha Muñoz NP on 8/6/2020 12/16/2021 Tumor Conference    His case was discussed at the Multidisciplinary Head and Neck Team Planning Meeting.    Representatives from Medical Oncology, Radiation Oncology, Head and Neck Surgical Oncology, Psychosocial Oncology, and Speech and Language Pathology discussed the case with the following recommendations:    1) TL  2) oncology referral  3) psychology referral            12/27/2021 Surgery    1. DL And tracheostomy  2. PEG     1/6/2022 Surgery    1. Diagnostic direct laryngoscopy  2. Bilateral modified neck dissection of levels 2 through 4  3. Total laryngectomy  4. Total thyroidectomy with bilateral paratracheal/upper mediastinal neck dissection  5. Autotransplantation of left inferior parathyroid into left sternocleidomastoid muscle   6. Left anterolateral thigh free flap for closure of total laryngectomy neck skin defect  7. Advancement flap for closure of left thigh donor site advanced area was 25 x 10 cm     1/20/2022 Cancer Staged    Staging form: Larynx - Glottis, AJCC 8th Edition  - Pathologic stage  from 1/20/2022: Stage LOU (pT4a, pN0, cM0)       5/30/2022 Cancer Staged    Staging form: Pharynx - P16 Negative Oropharynx, AJCC 8th Edition  - Clinical: Stage II (rcT2, cN0, cM0)       1/23/2023 -  Chemotherapy    Treatment Summary   Plan Name: OP HEAD NECK CISPLATIN WEEKLY + RADIOTHERAPY  Treatment Goal: Curative  Status: Active  Start Date: 1/23/2023  End Date: 3/6/2023 (Planned)  Provider: Venu Tamez MD  Chemotherapy: CISplatin (Platinol) 40 mg/m2 = 66 mg in sodium chloride 0.9% 631 mL chemo infusion, 40 mg/m2 = 66 mg, Intravenous, Clinic/[facility] 1 time, 3 of 7 cycles  Administration: 66 mg (1/23/2023), 66 mg (2/6/2023)       Malignant neoplasm of base of tongue   5/20/2022 Cancer Staged    Staging form: Pharynx - P16 Negative Oropharynx, AJCC 8th Edition  - Clinical stage from 5/20/2022: Stage II (cT2, cN0, cM0, p16-)       6/22/2022 Initial Diagnosis    Primary cancer of base of tongue     7/11/2022 - 8/26/2022 Chemotherapy    Treatment Summary   Plan Name: OP HEAD NECK PEMBROLIZUMAB CARBOPLATIN FLUOROURACIL Q3W FOLLOWED BY MAINTENANCE PEMBROLIZUMAB 400 MG Q6W  Treatment Goal: Palliative  Status: Inactive  Start Date: 7/11/2022  End Date: 8/26/2022  Provider: Aurash Khoobehi, MD  Chemotherapy: CARBOplatin (PARAPLATIN) 440 mg in sodium chloride 0.9% 544 mL chemo infusion, 440 mg (100 % of original dose 438.5 mg), Intravenous, Clinic/[facility] 1 time, 3 of 6 cycles  Dose modification:   (original dose 438.5 mg, Cycle 1)  Administration: 440 mg (7/11/2022), 435 mg (8/1/2022), 510 mg (8/22/2022)       1/23/2023 -  Chemotherapy    Treatment Summary   Plan Name: OP HEAD NECK CISPLATIN WEEKLY + RADIOTHERAPY  Treatment Goal: Curative  Status: Active  Start Date: 1/23/2023  End Date: 3/6/2023 (Planned)  Provider: Venu Tamez MD  Chemotherapy: CISplatin (Platinol) 40 mg/m2 = 66 mg in sodium chloride 0.9% 631 mL chemo infusion, 40 mg/m2 = 66 mg, Intravenous, Clinic/[facility] 1 time, 3 of 7 cycles  Administration: 66 mg  (1/23/2023), 66 mg (2/6/2023)             HPI   72 y.o. male presents with the above treatment history.  He is now 4 mos status post total glossectomy and ALT free flap reconstruction. No significant complaints.  He is currently undergoing CRT.    Review of Systems   Constitutional: Negative for fatigue and unexpected weight change.   HENT: Per HPI.  Eyes: Negative for visual disturbance.   Respiratory: Negative for shortness of breath, hemoptysis   Cardiovascular: Negative for chest pain and palpitations.   Musculoskeletal: Negative for decreased ROM, back pain.   Skin: Negative for rash, sunburn, itching.   Neurological: Negative for dizziness and seizures.   Hematological: Negative for adenopathy. Does not bruise/bleed easily.   Endocrine: Negative for rapid weight loss/weight gain, heat/cold intolerance.     Past Medical History   Patient Active Problem List   Diagnosis    Melanocytic nevus    Body mass index (BMI) of 29.0-29.9 in adult    Benign hypertension    Overweight    Paroxysmal atrial fibrillation    Type 2 diabetes mellitus with diabetic arthropathy, with long-term current use of insulin    Fatty liver disease, nonalcoholic    False positive serological test for hepatitis C    Hyperlipidemia    Type 2 diabetes mellitus with hyperglycemia, without long-term current use of insulin    Gastroesophageal reflux disease without esophagitis    Type 2 diabetes mellitus with hyperglycemia    Vitamin B12 deficiency    Hyperuricemia    Hypovitaminosis D    Proteinuria    On enteral nutrition    Larynx cancer    Dehydration    NSVT (nonsustained ventricular tachycardia)    Adverse effect of adrenal cortical steroids, sequela    S/P laryngectomy    Hypocalcemia    Aphonia    Dysphagia, pharyngoesophageal    Acute pain of both shoulders    Bilateral arm weakness    Oral bleeding    Malignant neoplasm of base of tongue    Advanced care planning/counseling discussion    Iron deficiency anemia due to chronic blood loss     Postoperative hypothyroidism    Pressure injury of sacral region, stage 1    Pneumatosis intestinalis    Nausea and vomiting    Neutropenia    Abnormal CT scan, neck    Open wound of neck    Open wnd of pharynx    Open wound of left thigh    Open wound of mouth    Tracheostomy in place    Malnutrition of mild degree    Type 2 diabetes mellitus, without long-term current use of insulin    Laryngeal cancer    Hypocalcemia    Hyperbilirubinemia    Elevated transaminase level    Hyponatremia    PEG (percutaneous endoscopic gastrostomy) adjustment/replacement/removal    Hypothyroidism    Dehydration    Pharyngocutaneous fistula    Protein malnutrition           Past Surgical History   Past Surgical History:   Procedure Laterality Date    DIRECT LARYNGOBRONCHOSCOPY N/A 12/27/2021    Procedure: LARYNGOSCOPY, DIRECT, WITH BRONCHOSCOPY;  Surgeon: Flex Espinosa MD;  Location: [facility] OR [address] [place] [address];  Service: ENT;  Laterality: N/A;    DIRECT LARYNGOSCOPY  11/9/2022    Procedure: LARYNGOSCOPY, DIRECT;  Surgeon: Jesse James MD;  Location: [address];  Service: ENT;;    DISSECTION OF NECK Bilateral 1/6/2022    Procedure: DISSECTION, NECK;  Surgeon: Jesse James MD;  Location: [address];  Service: ENT;  Laterality: Bilateral;    DISSECTION OF NECK Bilateral 11/9/2022    Procedure: DISSECTION, NECK;  Surgeon: Jesse James MD;  Location: [address];  Service: ENT;  Laterality: Bilateral;    FLAP PROCEDURE Right 1/6/2022    Procedure: CREATION, FREE FLAP;  Surgeon: Alise Hart MD;  Location: [address];  Service: ENT;  Laterality: Right;  Ischemic start 1351  Ischemic stop 1502    FLAP PROCEDURE Left 11/9/2022    Procedure: CREATION, FREE FLAP, ALT;  Surgeon: Alise Hart MD;  Location: [address];  Service: ENT;  Laterality: Left;    GLOSSECTOMY Bilateral 11/9/2022    Procedure: TOTAL GLOSSECTOMY;  Surgeon: Jesse James MD;  Location: [address];  Service: ENT;   Laterality: Bilateral;    INSERTION OF TUNNELED CENTRAL VENOUS CATHETER (CVC) WITH SUBCUTANEOUS PORT N/A 6/9/2022    Procedure: [id number]-CATH;  Surgeon: Jesus Viera MD;  Location: [facility] OR;  Service: General;  Laterality: N/A;    INSERTION OF TUNNELED CENTRAL VENOUS CATHETER (CVC) WITH SUBCUTANEOUS PORT Right 1/12/2023    Procedure: [id number]-[id number];  Surgeon: Abdulaziz Le Jr., MD;  Location: [facility] OR;  Service: General;  Laterality: Right;    LARYNGECTOMY N/A 1/6/2022    Procedure: LARYNGECTOMY;  Surgeon: Jesse James MD;  Location: [facility] OR 2ND FLR;  Service: ENT;  Laterality: N/A;    LARYNGOSCOPY N/A 8/4/2020    Procedure: Suspension microlaryngoscopy with biopsy, possible KTP laser treatment/excision;  Surgeon: Stew Noel MD;  Location: [facility] OR [facility] FLR;  Service: ENT;  Laterality: N/A;  Microscope, telescopes, tower, microinstruments, KTP laser, rep conf# [id number] IC 7/28.    LARYNGOSCOPY N/A 3/16/2021    Procedure: Suspension microlaryngoscopy with excision of lesion, possible CO2 laser;  Surgeon: Stew Noel MD;  Location: [facility] OR 2ND FLR;  Service: ENT;  Laterality: N/A;  Microscope, telescopes, tower, microinstruments, CO2 laser, rep conf# [id number] IC 3/4.    LARYNGOSCOPY N/A 4/1/2021    Procedure: Suspension microlaryngoscopy with KTP laser excision of lesion;  Surgeon: Stew Noel MD;  Location: [facility] OR 2ND FLR;  Service: ENT;  Laterality: N/A;  Microscope, telescopes, tower, microinstruments, 70 degree scope, vocal fold , KTP laser, rep conf# [id number] BC    LARYNGOSCOPY N/A 12/9/2021    Procedure: Suspension microlaryngoscopy with biopsy;  Surgeon: Stew Noel MD;  Location: [facility] OR 2ND FLR;  Service: ENT;  Laterality: N/A;  Microscope, telescopes, tower, microinstruments    LARYNGOSCOPY N/A 1/6/2022    Procedure: LARYNGOSCOPY;  Surgeon: Jesse James MD;  Location: [facility] OR [address] [place] [address];  Service: ENT;  Laterality: N/A;     LARYNGOSCOPY N/A 4/27/2022    Procedure: LARYNGOSCOPY WITH BIOPSY;  Surgeon: Jesse James MD;  Location: [facility] OR 2ND FLR;  Service: ENT;  Laterality: N/A;    MICROLARYNGOSCOPY N/A 3/17/2020    Procedure: MICROLARYNGOSCOPY;  Surgeon: Jung Xiao MD;  Location: [facility] OR;  Service: ENT;  Laterality: N/A;  Laser Microlaryngoscopy  NEED TO SCHEDULE LASER from [facility] [id number]    PHARYNGECTOMY  11/9/2022    Procedure: TOTAL PHARYNGECTOMY;  Surgeon: Jesse James MD;  Location: [facility] OR 2ND FLR;  Service: ENT;;    REIMPLANTATION OF PARATHYROID TISSUE N/A 1/6/2022    Procedure: REIMPLANTATION, PARATHYROID TISSUE;  Surgeon: Jesse James MD;  Location: [facility] OR 2ND FLR;  Service: ENT;  Laterality: N/A;    SKIN SPLIT GRAFT Right 11/9/2022    Procedure: APPLICATION, GRAFT, SKIN, SPLIT-THICKNESS;  Surgeon: Jesse James MD;  Location: [facility] OR 2ND FLR;  Service: ENT;  Laterality: Right;    THYROIDECTOMY  1/6/2022    Procedure: THYROIDECTOMY;  Surgeon: Jesse James MD;  Location: [facility] OR 2ND FLR;  Service: ENT;;    TRACHEOSTOMY N/A 12/27/2021    Procedure: CREATION, TRACHEOSTOMY;  Surgeon: Flex Espinosa MD;  Location: [facility] OR 2ND FLR;  Service: ENT;  Laterality: N/A;         Family History   Family History   Problem Relation Age of Onset    Abnormal EKG Mother     Diabetes Father     Heart disease Father     Hypertension Father            Social History   .  Social History     Socioeconomic History    Marital status:      Spouse name: Janel Batres    Number of children: 2   Occupational History    Occupation: [employer]     Employer: [employer]   Tobacco Use    Smoking status: Never    Smokeless tobacco: Never   Substance and Sexual Activity    Alcohol use: Not Currently     Comment: occasional    Drug use: No    Sexual activity: Yes     Partners: Female   Social History Narrative    ** Merged History Encounter **         2 children from his 1st wife     Social  Determinants of Health     Financial Resource Strain: Low Risk     Difficulty of Paying Living Expenses: Not hard at all   Food Insecurity: No Food Insecurity    Worried About Running Out of Food in the Last Year: Never true    Ran Out of Food in the Last Year: Never true   Transportation Needs: No Transportation Needs    Lack of Transportation (Medical): No    Lack of Transportation (Non-Medical): No   Physical Activity: Insufficiently Active    Days of Exercise per Week: 1 day    Minutes of Exercise per Session: 30 min   Stress: Stress Concern Present    Feeling of Stress : To some extent   Social Connections: Socially Isolated    Frequency of Communication with Friends and Family: Once a week    Frequency of Social Gatherings with Friends and Family: Once a week    Attends Denominational Services: Never    Active Member of Clubs or Organizations: No    Attends Club or Organization Meetings: Never    Marital Status:    Housing Stability: Low Risk     Unable to Pay for Housing in the Last Year: No    Number of Places Lived in the Last Year: 1    Unstable Housing in the Last Year: No         Allergies   Review of patient's allergies indicates:   Allergen Reactions    Lovastatin Itching    Pollen extracts     Lovastatin Rash     Not confirmed but pt skeptical           Physical Exam     There were no vitals filed for this visit.        Body mass index is 21.39 kg/m².      General: AOx3, NAD   Respiratory:  Symmetric chest rise, normal effort  Oral Cavity:  Flap healthy. No worrisome lesions.   Oropharynx:  No masses/lesions of the posterior pharyngeal wall. Tonsillar fossa without lesions. Soft palate without masses. Midline uvula.   Neck: Stoma widely patent. Skin paddle healthy with good color and turgor.Well-healed neck incisions.No LAD. Moderate post-op, post-treatment fibrosis. Submental neck diffusely full.  Face: House Brackmann I bilaterally.      Assessment/Plan  Problem List Items Addressed This Visit           Oncology    Larynx cancer - Primary     Doing well. Complete CRT. RTC 1 month.

## 2023-02-09 ENCOUNTER — HOSPITAL ENCOUNTER (OUTPATIENT)
Dept: RADIOLOGY | Facility: HOSPITAL | Age: 73
Discharge: HOME | End: 2023-02-09
Attending: NURSE PRACTITIONER
Payer: MEDICARE

## 2023-02-09 DIAGNOSIS — R74.01 TRANSAMINITIS: ICD-10-CM

## 2023-02-09 PROCEDURE — 76700 US EXAM ABDOM COMPLETE: CPT | Mod: TC,PO

## 2023-02-10 ENCOUNTER — LAB VISIT (OUTPATIENT)
Dept: LAB | Facility: HOSPITAL | Age: 73
End: 2023-02-10
Attending: NURSE PRACTITIONER
Payer: MEDICARE

## 2023-02-10 ENCOUNTER — PATIENT MESSAGE (OUTPATIENT)
Dept: HEMATOLOGY/ONCOLOGY | Facility: CLINIC | Age: 73
End: 2023-02-10

## 2023-02-10 DIAGNOSIS — E83.51 HYPOCALCEMIA: ICD-10-CM

## 2023-02-10 DIAGNOSIS — C01 MALIGNANT NEOPLASM OF BASE OF TONGUE: ICD-10-CM

## 2023-02-10 LAB
ALBUMIN SERPL BCP-MCNC: 4.4 G/DL (ref 3.5–5.2)
ALP SERPL-CCNC: 378 U/L (ref 55–135)
ALT SERPL W/O P-5'-P-CCNC: 106 U/L (ref 10–44)
ANION GAP SERPL CALC-SCNC: 10 MMOL/L (ref 8–16)
AST SERPL-CCNC: 58 U/L (ref 10–40)
BASOPHILS # BLD AUTO: 0.01 K/UL (ref 0–0.2)
BASOPHILS NFR BLD: 0.3 % (ref 0–1.9)
BILIRUB SERPL-MCNC: 1.1 MG/DL (ref 0.1–1)
BUN SERPL-MCNC: 25 MG/DL (ref 8–23)
CALCIUM SERPL-MCNC: 7.8 MG/DL (ref 8.7–10.5)
CHLORIDE SERPL-SCNC: 101 MMOL/L (ref 95–110)
CO2 SERPL-SCNC: 26 MMOL/L (ref 23–29)
CREAT SERPL-MCNC: 0.9 MG/DL (ref 0.5–1.4)
DIFFERENTIAL METHOD: ABNORMAL
EOSINOPHIL # BLD AUTO: 0.1 K/UL (ref 0–0.5)
EOSINOPHIL NFR BLD: 1.6 % (ref 0–8)
ERYTHROCYTE [DISTWIDTH] IN BLOOD BY AUTOMATED COUNT: 14 % (ref 11.5–14.5)
EST. GFR  (NO RACE VARIABLE): >60 ML/MIN/1.73 M^2
GLUCOSE SERPL-MCNC: 103 MG/DL (ref 70–110)
HCT VFR BLD AUTO: 32.9 % (ref 40–54)
HGB BLD-MCNC: 10.9 G/DL (ref 14–18)
IMM GRANULOCYTES # BLD AUTO: 0.01 K/UL (ref 0–0.04)
IMM GRANULOCYTES NFR BLD AUTO: 0.3 % (ref 0–0.5)
LYMPHOCYTES # BLD AUTO: 0.7 K/UL (ref 1–4.8)
LYMPHOCYTES NFR BLD: 19.3 % (ref 18–48)
MCH RBC QN AUTO: 30.9 PG (ref 27–31)
MCHC RBC AUTO-ENTMCNC: 33.1 G/DL (ref 32–36)
MCV RBC AUTO: 93 FL (ref 82–98)
MONOCYTES # BLD AUTO: 0.3 K/UL (ref 0.3–1)
MONOCYTES NFR BLD: 8.6 % (ref 4–15)
NEUTROPHILS # BLD AUTO: 2.6 K/UL (ref 1.8–7.7)
NEUTROPHILS NFR BLD: 69.9 % (ref 38–73)
NRBC BLD-RTO: 0 /100 WBC
PLATELET # BLD AUTO: 198 K/UL (ref 150–450)
PMV BLD AUTO: 10.9 FL (ref 9.2–12.9)
POTASSIUM SERPL-SCNC: 4.2 MMOL/L (ref 3.5–5.1)
PROT SERPL-MCNC: 7.3 G/DL (ref 6–8.4)
RBC # BLD AUTO: 3.53 M/UL (ref 4.6–6.2)
SODIUM SERPL-SCNC: 137 MMOL/L (ref 136–145)
WBC # BLD AUTO: 3.74 K/UL (ref 3.9–12.7)

## 2023-02-10 PROCEDURE — 36415 COLL VENOUS BLD VENIPUNCTURE: CPT | Performed by: NURSE PRACTITIONER

## 2023-02-10 PROCEDURE — 80053 COMPREHEN METABOLIC PANEL: CPT | Performed by: NURSE PRACTITIONER

## 2023-02-10 PROCEDURE — 85025 COMPLETE CBC W/AUTO DIFF WBC: CPT | Performed by: NURSE PRACTITIONER

## 2023-02-10 RX ORDER — HEPARIN 100 UNIT/ML
500 SYRINGE INTRAVENOUS
Status: CANCELLED | OUTPATIENT
Start: 2023-02-13

## 2023-02-10 RX ORDER — CALCIUM CARBONATE 1250 MG/5ML
1000 SUSPENSION ORAL
Qty: 473 ML | Refills: 12 | Status: CANCELLED | OUTPATIENT
Start: 2023-02-10 | End: 2024-02-10

## 2023-02-10 RX ORDER — SODIUM CHLORIDE 0.9 % (FLUSH) 0.9 %
10 SYRINGE (ML) INJECTION
Status: CANCELLED | OUTPATIENT
Start: 2023-02-13

## 2023-02-10 RX ORDER — CALCIUM CARBONATE 1250 MG/5ML
1000 SUSPENSION ORAL
Qty: 473 ML | Refills: 12 | Status: SHIPPED | OUTPATIENT
Start: 2023-02-10 | End: 2023-05-01 | Stop reason: SDUPTHER

## 2023-02-11 ENCOUNTER — EXTERNAL HOME HEALTH (OUTPATIENT)
Dept: HOME HEALTH SERVICES | Facility: HOSPITAL | Age: 73
End: 2023-02-11
Payer: MEDICARE

## 2023-02-13 ENCOUNTER — INFUSION (OUTPATIENT)
Dept: INFUSION THERAPY | Facility: HOSPITAL | Age: 73
End: 2023-02-13
Attending: INTERNAL MEDICINE
Payer: MEDICARE

## 2023-02-13 ENCOUNTER — OFFICE VISIT (OUTPATIENT)
Dept: HEMATOLOGY/ONCOLOGY | Facility: CLINIC | Age: 73
End: 2023-02-13
Payer: MEDICARE

## 2023-02-13 ENCOUNTER — DOCUMENTATION ONLY (OUTPATIENT)
Dept: HEMATOLOGY/ONCOLOGY | Facility: CLINIC | Age: 73
End: 2023-02-13

## 2023-02-13 VITALS
TEMPERATURE: 98 F | RESPIRATION RATE: 17 BRPM | WEIGHT: 129 LBS | SYSTOLIC BLOOD PRESSURE: 123 MMHG | OXYGEN SATURATION: 100 % | DIASTOLIC BLOOD PRESSURE: 65 MMHG | HEART RATE: 75 BPM | BODY MASS INDEX: 20.73 KG/M2 | HEIGHT: 66 IN

## 2023-02-13 VITALS
BODY MASS INDEX: 20.73 KG/M2 | DIASTOLIC BLOOD PRESSURE: 61 MMHG | TEMPERATURE: 97 F | WEIGHT: 129 LBS | HEIGHT: 66 IN | HEART RATE: 78 BPM | SYSTOLIC BLOOD PRESSURE: 138 MMHG | RESPIRATION RATE: 15 BRPM | OXYGEN SATURATION: 100 %

## 2023-02-13 DIAGNOSIS — E83.51 HYPOCALCEMIA: ICD-10-CM

## 2023-02-13 DIAGNOSIS — C01 MALIGNANT NEOPLASM OF BASE OF TONGUE: ICD-10-CM

## 2023-02-13 DIAGNOSIS — C32.9 LARYNGEAL CANCER: Primary | ICD-10-CM

## 2023-02-13 DIAGNOSIS — C01 MALIGNANT NEOPLASM OF BASE OF TONGUE: Primary | ICD-10-CM

## 2023-02-13 DIAGNOSIS — C32.9 LARYNX CANCER: ICD-10-CM

## 2023-02-13 PROCEDURE — 1126F PR PAIN SEVERITY QUANTIFIED, NO PAIN PRESENT: ICD-10-PCS | Mod: CPTII,S$GLB,, | Performed by: NURSE PRACTITIONER

## 2023-02-13 PROCEDURE — 96366 THER/PROPH/DIAG IV INF ADDON: CPT

## 2023-02-13 PROCEDURE — 1126F AMNT PAIN NOTED NONE PRSNT: CPT | Mod: CPTII,S$GLB,, | Performed by: NURSE PRACTITIONER

## 2023-02-13 PROCEDURE — A4216 STERILE WATER/SALINE, 10 ML: HCPCS | Performed by: INTERNAL MEDICINE

## 2023-02-13 PROCEDURE — 96367 TX/PROPH/DG ADDL SEQ IV INF: CPT

## 2023-02-13 PROCEDURE — 99214 OFFICE O/P EST MOD 30 MIN: CPT | Mod: S$GLB,,, | Performed by: NURSE PRACTITIONER

## 2023-02-13 PROCEDURE — 3008F PR BODY MASS INDEX (BMI) DOCUMENTED: ICD-10-PCS | Mod: CPTII,S$GLB,, | Performed by: NURSE PRACTITIONER

## 2023-02-13 PROCEDURE — 63600175 PHARM REV CODE 636 W HCPCS: Performed by: INTERNAL MEDICINE

## 2023-02-13 PROCEDURE — 3074F SYST BP LT 130 MM HG: CPT | Mod: CPTII,S$GLB,, | Performed by: NURSE PRACTITIONER

## 2023-02-13 PROCEDURE — 3074F PR MOST RECENT SYSTOLIC BLOOD PRESSURE < 130 MM HG: ICD-10-PCS | Mod: CPTII,S$GLB,, | Performed by: NURSE PRACTITIONER

## 2023-02-13 PROCEDURE — 96361 HYDRATE IV INFUSION ADD-ON: CPT

## 2023-02-13 PROCEDURE — 96413 CHEMO IV INFUSION 1 HR: CPT

## 2023-02-13 PROCEDURE — 96375 TX/PRO/DX INJ NEW DRUG ADDON: CPT

## 2023-02-13 PROCEDURE — 3008F BODY MASS INDEX DOCD: CPT | Mod: CPTII,S$GLB,, | Performed by: NURSE PRACTITIONER

## 2023-02-13 PROCEDURE — 3078F DIAST BP <80 MM HG: CPT | Mod: CPTII,S$GLB,, | Performed by: NURSE PRACTITIONER

## 2023-02-13 PROCEDURE — 3078F PR MOST RECENT DIASTOLIC BLOOD PRESSURE < 80 MM HG: ICD-10-PCS | Mod: CPTII,S$GLB,, | Performed by: NURSE PRACTITIONER

## 2023-02-13 PROCEDURE — 25000003 PHARM REV CODE 250: Performed by: INTERNAL MEDICINE

## 2023-02-13 PROCEDURE — 99214 PR OFFICE/OUTPT VISIT, EST, LEVL IV, 30-39 MIN: ICD-10-PCS | Mod: S$GLB,,, | Performed by: NURSE PRACTITIONER

## 2023-02-13 RX ORDER — SODIUM CHLORIDE 0.9 % (FLUSH) 0.9 %
10 SYRINGE (ML) INJECTION
Status: DISCONTINUED | OUTPATIENT
Start: 2023-02-13 | End: 2023-02-13 | Stop reason: HOSPADM

## 2023-02-13 RX ORDER — HEPARIN 100 UNIT/ML
500 SYRINGE INTRAVENOUS
Status: DISCONTINUED | OUTPATIENT
Start: 2023-02-13 | End: 2023-02-13 | Stop reason: HOSPADM

## 2023-02-13 RX ORDER — CALCIUM CARBONATE 1250 MG/5ML
1000 SUSPENSION ORAL
Qty: 1200 ML | Refills: 12 | Status: SHIPPED | OUTPATIENT
Start: 2023-02-13 | End: 2023-03-07

## 2023-02-13 RX ADMIN — SODIUM CHLORIDE 1000 ML: 0.9 INJECTION, SOLUTION INTRAVENOUS at 11:02

## 2023-02-13 RX ADMIN — CISPLATIN 66 MG: 1 INJECTION, SOLUTION INTRAVENOUS at 11:02

## 2023-02-13 RX ADMIN — CALCIUM GLUCONATE: 98 INJECTION, SOLUTION INTRAVENOUS at 08:02

## 2023-02-13 RX ADMIN — SODIUM CHLORIDE, PRESERVATIVE FREE 10 ML: 5 INJECTION INTRAVENOUS at 01:02

## 2023-02-13 RX ADMIN — POTASSIUM CHLORIDE 150 ML/HR: 2 INJECTION, SOLUTION, CONCENTRATE INTRAVENOUS at 07:02

## 2023-02-13 RX ADMIN — APREPITANT 130 MG: 130 INJECTION, EMULSION INTRAVENOUS at 10:02

## 2023-02-13 RX ADMIN — HEPARIN 500 UNITS: 100 SYRINGE at 01:02

## 2023-02-13 RX ADMIN — PALONOSETRON HYDROCHLORIDE 0.25 MG: 0.25 INJECTION INTRAVENOUS at 10:02

## 2023-02-13 NOTE — PROGRESS NOTES
Medical Nutrition Therapy Oncology Progress Note      Patient's PCP:Maico Patterson MD  Referring Provider: No ref. provider found  Subjective:        Patient ID: Cyrus Batres Jr. is a 72 y.o. male.    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    PEG (percutaneous endoscopic gastrostomy) adjustment/replacement/removal 11/22/2022    Postoperative 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    Tongue cancer     Tracheostomy dependence     Type 2 diabetes mellitus, without long-term current use of insulin 11/22/2022       Past Surgical History:   Procedure Laterality Date    DIRECT LARYNGOBRONCHOSCOPY N/A 12/27/2021    Procedure: LARYNGOSCOPY, DIRECT, WITH BRONCHOSCOPY;  Surgeon: Flex Espinosa MD;  Location: [facility] OR [address] [place] [address];  Service: ENT;  Laterality: N/A;    DIRECT LARYNGOSCOPY  11/9/2022    Procedure: LARYNGOSCOPY, DIRECT;  Surgeon: Jesse James MD;  Location: [facility] OR [address] [place] [address];  Service: ENT;;    DISSECTION OF NECK Bilateral 1/6/2022    Procedure: DISSECTION, NECK;  Surgeon: Jesse James MD;  [facility];  Service: ENT;  Laterality: Bilateral;    DISSECTION OF NECK Bilateral 11/9/2022    Procedure: DISSECTION, NECK;  Surgeon: Jesse James MD;  Location: [facility] OR [address] [place] [address];  Service: ENT;  Laterality: Bilateral;    FLAP PROCEDURE Right 1/6/2022    Procedure: CREATION, FREE FLAP;  Surgeon: Alise Hart MD;  Location: [facility] OR [address] [place] [address];  Service: ENT;  Laterality: Right;  Ischemic start 1351  Ischemic stop 1502    FLAP PROCEDURE Left 11/9/2022     Procedure: CREATION, FREE FLAP, ALT;  Surgeon: Alise Hart MD;  Location: [facility] OR 2ND FLR;  Service: ENT;  Laterality: Left;    GLOSSECTOMY Bilateral 11/9/2022    Procedure: TOTAL GLOSSECTOMY;  Surgeon: Jesse James MD;  Location: [facility] OR 2ND FLR;  Service: ENT;  Laterality: Bilateral;    INSERTION OF TUNNELED CENTRAL VENOUS CATHETER (CVC) WITH SUBCUTANEOUS PORT N/A 6/9/2022    Procedure: [id number];  Surgeon: Jesus Viera MD;  Location: [facility] OR;  Service: General;  Laterality: N/A;    INSERTION OF TUNNELED CENTRAL VENOUS CATHETER (CVC) WITH SUBCUTANEOUS PORT Right 1/12/2023    Procedure: [id number]-[id number];  Surgeon: Abdulaziz Le Jr., MD;  Location: [facility] OR;  Service: General;  Laterality: Right;    LARYNGECTOMY N/A 1/6/2022    Procedure: LARYNGECTOMY;  Surgeon: Jesse James MD;  [facility];  Service: ENT;  Laterality: N/A;    LARYNGOSCOPY N/A 8/4/2020    Procedure: Suspension microlaryngoscopy with biopsy, possible KTP laser treatment/excision;  Surgeon: Stew Noel MD;  [facility];  Service: ENT;  Laterality: N/A;  Microscope, telescopes, tower, microinstruments, KTP laser, rep conf# [id number] IC 7/28.    LARYNGOSCOPY N/A 3/16/2021    Procedure: Suspension microlaryngoscopy with excision of lesion, possible CO2 laser;  Surgeon: Stew Noel MD;  [facility];  Service: ENT;  Laterality: N/A;  Microscope, telescopes, tower, microinstruments, CO2 laser, rep conf# [id number] IC 3/4.    LARYNGOSCOPY N/A 4/1/2021    Procedure: Suspension microlaryngoscopy with KTP laser excision of lesion;  Surgeon: Stew Noel MD;  [facility];  Service: ENT;  Laterality: N/A;  Microscope, telescopes, tower, microinstruments, 70 degree scope, vocal fold , KTP laser, rep conf# [id number] BC    LARYNGOSCOPY N/A 12/9/2021    Procedure: Suspension microlaryngoscopy with biopsy;  Surgeon: Stew Noel MD;   Location: [facility] OR 2ND FLR;  Service: ENT;  Laterality: N/A;  Microscope, telescopes, tower, microinstruments    LARYNGOSCOPY N/A 1/6/2022    Procedure: LARYNGOSCOPY;  Surgeon: Jesse James MD;  Location: [facility] OR 2ND FLR;  Service: ENT;  Laterality: N/A;    LARYNGOSCOPY N/A 4/27/2022    Procedure: LARYNGOSCOPY WITH BIOPSY;  Surgeon: Jesse James MD;  Location: [facility] OR 2ND FLR;  Service: ENT;  Laterality: N/A;    MICROLARYNGOSCOPY N/A 3/17/2020    Procedure: MICROLARYNGOSCOPY;  Surgeon: Jung Xiao MD;  Location: [facility] OR;  Service: ENT;  Laterality: N/A;  Laser Microlaryngoscopy  NEED TO SCHEDULE LASER from [facility] [id number]    PHARYNGECTOMY  11/9/2022    Procedure: TOTAL PHARYNGECTOMY;  Surgeon: Jesse James MD;  Location: [facility] OR 2ND FLR;  Service: ENT;;    REIMPLANTATION OF PARATHYROID TISSUE N/A 1/6/2022    Procedure: REIMPLANTATION, PARATHYROID TISSUE;  Surgeon: Jesse James MD;  Location: [facility] OR 2ND FLR;  Service: ENT;  Laterality: N/A;    SKIN SPLIT GRAFT Right 11/9/2022    Procedure: APPLICATION, GRAFT, SKIN, SPLIT-THICKNESS;  Surgeon: Jesse James MD;  Location: [facility] OR 2ND FLR;  Service: ENT;  Laterality: Right;    THYROIDECTOMY  1/6/2022    Procedure: THYROIDECTOMY;  Surgeon: Jesse James MD;  Location: [facility] OR [facility] FLR;  Service: ENT;;    TRACHEOSTOMY N/A 12/27/2021    Procedure: CREATION, TRACHEOSTOMY;  Surgeon: Flex Espinosa MD;  Location: [facility] OR 2ND FLR;  Service: ENT;  Laterality: N/A;       Social History     Socioeconomic History    Marital status:      Spouse name: Janel Batres    Number of children: 2   Occupational History    Occupation: [employer]     Employer: [employer]   Tobacco Use    Smoking status: Never    Smokeless tobacco: Never   Substance and Sexual Activity    Alcohol use: Not Currently     Comment: occasional    Drug use: No    Sexual activity: Yes     Partners: Female   Social History Narrative    ** Merged  "History Encounter **         2 children from his 1st wife     Social Determinants of Health     Financial Resource Strain: Low Risk     Difficulty of Paying Living Expenses: Not hard at all   Food Insecurity: No Food Insecurity    Worried About Running Out of Food in the Last Year: Never true    Ran Out of Food in the Last Year: Never true   Transportation Needs: No Transportation Needs    Lack of Transportation (Medical): No    Lack of Transportation (Non-Medical): No   Physical Activity: Insufficiently Active    Days of Exercise per Week: 1 day    Minutes of Exercise per Session: 30 min   Stress: Stress Concern Present    Feeling of Stress : To some extent   Social Connections: Socially Isolated    Frequency of Communication with Friends and Family: Once a week    Frequency of Social Gatherings with Friends and Family: Once a week    Attends Sabianism Services: Never    Active Member of Clubs or Organizations: No    Attends Club or Organization Meetings: Never    Marital Status:    Housing Stability: Low Risk     Unable to Pay for Housing in the Last Year: No    Number of Places Lived in the Last Year: 1    Unstable Housing in the Last Year: No       Family History   Problem Relation Age of Onset    Abnormal EKG Mother     Diabetes Father     Heart disease Father     Hypertension Father        Review of patient's allergies indicates:   Allergen Reactions    Lovastatin Itching    Pollen extracts     Lovastatin Rash     Not confirmed but pt skeptical       Current Outpatient Medications:     acetaminophen (TYLENOL) 325 MG tablet, Take 325 mg by mouth every 6 (six) hours as needed for Pain., Disp: , Rfl:     BD ULTRA-FINE YULISSA PEN NEEDLE 32 gauge x 5/32" Ndle, To be used with Novolog pen up to 4x a day, Disp: 100 each, Rfl: 3    calcitrioL (ROCALTROL) 1 mcg/mL solution, Take 0.25 mLs (0.25 mcg total) by Per G Tube route once daily., Disp: 15 mL, Rfl: 12    calcium carbonate (TUMS) 200 mg calcium (500 mg) " chewable tablet, 2 tablets (1,000 mg total) by Per G Tube route 5 (five) times daily., Disp: 300 tablet, Rfl: 11    calcium carbonate 500 mg/5 mL (1,250 mg/5 mL), 10 mLs (1,000 mg total) by Per G Tube route 4 (four) times daily with meals and nightly., Disp: 1200 mL, Rfl: 12    esomeprazole (NEXIUM) 40 MG capsule, 40 mg before breakfast., Disp: , Rfl:     famotidine (PEPCID) 40 mg/5 mL (8 mg/mL) suspension, 2.5 mLs (20 mg total) by Per G Tube route 2 (two) times daily., Disp: 50 mL, Rfl: 11    ibuprofen (ADVIL,MOTRIN) 200 MG tablet, Take 200 mg by mouth every 6 (six) hours as needed for Pain., Disp: , Rfl:     insulin aspart U-100 (NOVOLOG FLEXPEN U-100 INSULIN) 100 unit/mL (3 mL) InPn pen, Inject 0-10 Units into the skin as needed; Add correction scale if needed while on TF.  Blood sugar 180-230 add 2 units, 231-280 +4 units, 281-330 +6 units, 331-380 +8 units, >380 +10 units.  MDD 40 units, Disp: 12 mL, Rfl: 0    lactose-reduced food with fibr (JEVITY 1.5 TRISHA) 0.06 gram-1.5 kcal/mL Liqd, 6 cartons per day via peg.  Flush 60ml before and after each bolus, Disp: 180 each, Rfl: 11    levothyroxine (SYNTHROID) 100 MCG tablet, 1 tablet (100 mcg total) by Per G Tube route before breakfast., Disp: 30 tablet, Rfl: 11    losartan (COZAAR) 100 MG tablet, Take 0.5 tablets (50 mg total) by mouth once daily. (Patient taking differently: Take 50 mg by mouth every evening.), Disp: 45 tablet, Rfl: 3    metFORMIN (GLUCOPHAGE) 500 MG tablet, Take 1 tablet (500 mg total) by mouth 2 (two) times daily with meals., Disp: 60 tablet, Rfl: 3    ondansetron (ZOFRAN) 8 MG tablet, Take 1 tablet (8 mg total) by mouth every 8 (eight) hours as needed for Nausea., Disp: 30 tablet, Rfl: 2    promethazine (PHENERGAN) 25 MG tablet, Take 1 tablet (25 mg total) by mouth every 4 to 6 hours as needed for Nausea., Disp: 30 tablet, Rfl: 2    traZODone (DESYREL) 50 MG tablet, TAKE 1 TABLET BY MOUTH NIGHTLY AS NEEDED FOR INSOMNIA., Disp: 90 tablet, Rfl:  1    zolpidem (AMBIEN) 5 MG Tab, 1 tablet (5 mg total) by Per G Tube route nightly as needed (difficult with sleep)., Disp: 30 tablet, Rfl: 0  No current facility-administered medications for this visit.    Facility-Administered Medications Ordered in Other Visits:     heparin, porcine (PF) 100 unit/mL injection flush 500 Units, 500 Units, Intravenous, PRN, Venu Tamez MD, 500 Units at 02/13/23 1323    sodium chloride 0.9% flush 10 mL, 10 mL, Intravenous, PRN, Venu Tamez MD, 10 mL at 02/13/23 1323    All medications and past history have been reviewed.    OP HEAD NECK CISPLATIN WEEKLY + RADIOTHERAPY      Treatment Goal:   Curative      Status:   Active      Start Date:   1/23/2023      End Date:   3/6/2023 (Planned)      Provider:   Venu Tamez MD      Chemotherapy:   CISplatin (Platinol) 40 mg/m2 = 66 mg in sodium chloride 0.9% 631 mL chemo infusion, 40 mg/m2 = 66 mg, Intravenous, Clinic/HOD 1 time, 4 of 7 cycles    Administration: 66 mg (1/23/2023), 66 mg (2/13/2023), 66 mg (2/6/2023)      Objective:      Wt Readings from Last 20 Encounters:   02/13/23 58.5 kg (129 lb)   02/13/23 58.5 kg (129 lb)   02/07/23 60.1 kg (132 lb 7.9 oz)   02/06/23 56.8 kg (125 lb 3.2 oz)   02/06/23 56.8 kg (125 lb 3.2 oz)   01/31/23 58.7 kg (129 lb 6.6 oz)   01/30/23 56.4 kg (124 lb 7.2 oz)   01/30/23 56.4 kg (124 lb 7.2 oz)   01/27/23 58.5 kg (129 lb)   01/26/23 59 kg (130 lb 1.6 oz)   01/23/23 58.4 kg (128 lb 12 oz)   01/23/23 58.4 kg (128 lb 12 oz)   01/18/23 59.4 kg (130 lb 15.3 oz)   01/13/23 59.4 kg (130 lb 14.4 oz)   01/12/23 57.6 kg (127 lb)   01/09/23 57.7 kg (127 lb 3.3 oz)   01/03/23 58.5 kg (128 lb 15.5 oz)   01/03/23 57.7 kg (127 lb 4.8 oz)   12/27/22 58.9 kg (129 lb 12.8 oz)   12/16/22 59.1 kg (130 lb 4.7 oz)       Last Labs:  Last Labs:  Glucose   Date Value Ref Range Status   02/10/2023 103 70 - 110 mg/dL Final   02/03/2023 108 70 - 110 mg/dL Final     BUN   Date Value Ref Range Status    02/10/2023 25 (H) 8 - 23 mg/dL Final   02/03/2023 18 8 - 23 mg/dL Final     Creatinine   Date Value Ref Range Status   02/10/2023 0.9 0.5 - 1.4 mg/dL Final   02/03/2023 0.9 0.5 - 1.4 mg/dL Final     Sodium   Date Value Ref Range Status   02/10/2023 137 136 - 145 mmol/L Final   02/03/2023 138 136 - 145 mmol/L Final     Potassium   Date Value Ref Range Status   02/10/2023 4.2 3.5 - 5.1 mmol/L Final   02/03/2023 3.9 3.5 - 5.1 mmol/L Final     Phosphorus   Date Value Ref Range Status   11/24/2022 2.4 (L) 2.7 - 4.5 mg/dL Final   11/23/2022 3.6 2.7 - 4.5 mg/dL Final     Calcium   Date Value Ref Range Status   02/10/2023 7.8 (L) 8.7 - 10.5 mg/dL Final   02/03/2023 8.3 (L) 8.7 - 10.5 mg/dL Final     Prealbumin   Date Value Ref Range Status   09/03/2022 19 (L) 20.0 - 43.0 mg/dL Final     Total Protein   Date Value Ref Range Status   02/10/2023 7.3 6.0 - 8.4 g/dL Final   02/03/2023 8.0 6.0 - 8.4 g/dL Final     Cholesterol   Date Value Ref Range Status   02/14/2022 144 120 - 199 mg/dL Final     Comment:     The National Cholesterol Education Program (NCEP) has set the  following guidelines (reference ranges) for Cholesterol:  Optimal.....................<200 mg/dL  Borderline High.............200-239 mg/dL  High........................> or = 240 mg/dL     11/18/2020 174 120 - 199 mg/dL Final     Comment:     The National Cholesterol Education Program (NCEP) has set the  following guidelines (reference ranges) for Cholesterol:  Optimal.....................<200 mg/dL  Borderline High.............200-239 mg/dL  High........................> or = 240 mg/dL       Hemoglobin A1C   Date Value Ref Range Status   11/22/2022 5.8 4.5 - 6.2 % Final     Comment:     According to ADA guidelines, hemoglobin A1C <7.0% represents  optimal control in non-pregnant diabetic patients.  Different  metrics may apply to specific populations.   Standards of Medical Care in Diabetes - 2016.    For the purpose of screening for the presence of  diabetes:  <5.7%     Consistent with the absence of diabetes  5.7-6.4%  Consistent with increasing risk for diabetes   (prediabetes)  >or=6.5%  Consistent with diabetes    Currently no consensus exists for use of hemoglobin A1C  for diagnosis of diabetes for children.     11/09/2022 5.7 (H) 4.0 - 5.6 % Final     Comment:     ADA Screening Guidelines:  5.7-6.4%  Consistent with prediabetes  >or=6.5%  Consistent with diabetes    High levels of fetal hemoglobin interfere with the HbA1C  assay. Heterozygous hemoglobin variants (HbS, HgC, etc)do  not significantly interfere with this assay.   However, presence of multiple variants may affect accuracy.       Hemoglobin   Date Value Ref Range Status   02/10/2023 10.9 (L) 14.0 - 18.0 g/dL Final   02/03/2023 11.0 (L) 14.0 - 18.0 g/dL Final     POC Hematocrit   Date Value Ref Range Status   11/09/2022 25 (L) 36 - 54 %PCV Final   11/09/2022 24 (L) 36 - 54 %PCV Final     Hematocrit   Date Value Ref Range Status   02/10/2023 32.9 (L) 40.0 - 54.0 % Final   02/03/2023 33.5 (L) 40.0 - 54.0 % Final     Iron   Date Value Ref Range Status   11/25/2022 30 (L) 45 - 160 ug/dL Final   09/04/2022 58 45 - 160 ug/dL Final     No components found for: FROLATE  Vit D, 25-Hydroxy   Date Value Ref Range Status   02/14/2022 37 30 - 96 ng/mL Final     Comment:     Vitamin D deficiency.........<10 ng/mL                              Vitamin D insufficiency......10-29 ng/mL       Vitamin D sufficiency........> or equal to 30 ng/mL  Vitamin D toxicity............>100 ng/mL     11/18/2020 38 30 - 96 ng/mL Final     Comment:     Vitamin D deficiency.........<10 ng/mL                              Vitamin D insufficiency......10-29 ng/mL       Vitamin D sufficiency........> or equal to 30 ng/mL  Vitamin D toxicity............>100 ng/mL       WBC   Date Value Ref Range Status   02/10/2023 3.74 (L) 3.90 - 12.70 K/uL Final   02/03/2023 5.30 3.90 - 12.70 K/uL Final       Assessment:     Nutrition/Diet  History     Patient Reported Diet/Restrictions/Preferences: sips of thin liquids  Food Allergies: NKFA  Factors Affecting Nutritional Intake: s/p laryngectomy, glossectomy    Estimated/Assessed Needs     Weight Used For Calorie Calculations: 62 kg (137 lb)  Energy Calorie Requirements (kcal): 8146-8575 kcal/day   Energy Need Method: 30-35 Kcal/kg  Protein Requirements:  g/day   Protein Need Method: 1.5-2.0 g/kg  Fluid Requirements: 2000 ml/day  Estimated Fluid Requirement Method: 1ml/kcal      Nutrition Support  Current EN: Jevity 1.5- 6 cartons per day with 60ml FWF before and after each bolus. Provides 2130 calories, 90g protein and 1800ml FW. Recommend extra 60ml FWF TID to meet estimated fluid needs.    Evaluation of Received Nutrient/Fluid Intake     Calorie Intake: meeting needs  Protein Intake: meeting needs  Fluid Intake: meeting needs  Tolerance: tolerating  % Intake of Estimated Energy Needs: 100 % via peg      Nutrition Diagnosis Related to (Etiology) As Evidenced By (Signs/Symptoms)   Unintended weight loss Physiological causes increasing nutrient needs BoT cancer, Estimated intake of protein insufficient to meet requirements     RD Notes  Mr. Cyrus Batres is a 71y/o male with 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. Pt is schedule for glossectomy surgery for his base of tongue cancer in 1 month. He is currently NPO and recently switch to Jevity 1.5 and is tolerating 6 cartons per day without associated N/V/D, gas or bloating. He does have chronic diarrhea not associated with feeding times. He present today with questions regarding how he can increase his nutrition to prepare for surgery next month. He reports he would like to switch to [employer] to promote regular BMs and help control BG. He reports recently weight gain of 7-8# since increasing his TF to 6 cartons per day. CW: 137#    1/23/22: RD met with Mr Batres infusion today for nutrition follow up. Pt  will be starting new treatment with cisplatin and concurrent XRT s/p extensive resection last month. Pt denies having any current problems with peg feedings. He has good knowledge of nutrition related side effects of treatment. Denies N/V/D/C. BMs normal. CW: 128#    1/30: Pt reports he continues to bolus 5 cartons of Jevity per day without s/s of intolerance. He is working himself up to 6 cartons per day due to increased needs during CCXRT. He denies N/V/D/C. He reports good hydration via peg. Noted 43 weight loss in 1 week but pt reports he was wearing a heavy coat last week when his weight was taken. CW: 124#    2/6: Pt reports good tolerance to treatment so far. Denies any N/V/D/C, gas or bloating with EN. Continues to work himself up to 6 cartons per day with extra fluids via peg. He denies having any nutrition related questions or concerns at the current time. CW: 125#    2/13: RD met with Mr Batres for nutrition follow up today. Pt is starting cycle 3 of treatment today and denies any N/V/D/C, gas, bloating. He denies having any problems with his peg. He reports he was cleared by ST and his surgeon  to take sips of liquids including smoothies. He denies having any nutrition related questions or concerns at the current time. CW: 129#    Nutrition Intervention:      Nutrition Intervention Enteral Nutrition  Composition   Goals/Expected Outcomes Continue EN at 6 cartons per day to meet estimated nutritional needs and promote regular BMs   Progress Progressing towards goal     Nutrition Intervention Fluid-modified diet   Goals/Expected Outcomes Continue aggressive hydration via peg to prevent dehydration during therapy   Progress Initial     Plan  Continue EN at goal rate. Advance to 6 cartons per day as tolerated to prevent unintentional weight loss  Continue aggressive hydration via peg to prevent dehydration  Continue oral intake with SLP guidance  Provided RD contact info. Encouraged pt to contact RD with  questions or concerns    Monitoring/Evaluation:     Monitor: TF tolerance, weight, BMs    Next Visit: f/u weekly or as needed      I have explained and the patient understands all of  the current recommendation(s). I have answered all of their questions to the best of my ability and to their complete satisfaction.   The patient is to continue with the current management plan.    Electronically signed by: Barbara Zayas MBA, [place], MALLORY

## 2023-02-13 NOTE — PROGRESS NOTES
[facility] Hematology/Oncology  PROGRESS NOTE -  Follow-up Visit      Subjective:       Patient ID:   NAME: Cyrus Batres Jr. : 1950     72 y.o. 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  The patient is here today to go over the results of the recently ordered labs, tests and studies.     He is getting chemotherapy again today ( cycle 3) and continues with the XRT. He has healed up well from his prior surgery.  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 He had his portacath removed on  and replaced on 2023 by Dr Le    He is breathing ok. No HA's or CP; no pain at this time  He has been having some hypocalcemia issues, and is requiring IV calcium and calcium via the PEG tube.           Discussed covid19 and he has been vaccinated      ROS:   GEN: normal without any fever, night sweats or weight loss; doing well with tube feeds   HEENT: normal with no HA's, sore throat, stiff neck, changes in vision  CV: normal with no CP, SOB, PND, GRIFFIN or orthopnea  PULM: normal with no SOB, cough, hemoptysis, sputum or pleuritic pain  GI: no current N/V  ; has peg tube;    : normal with no hematuria, dysuria  BREAST: normal with no mass, discharge, pain  SKIN: normal with no rash, erythema, bruising, or swelling     Pain  "Scale:  0    Allergies:  Review of patient's allergies indicates:   Allergen Reactions    Lovastatin Itching    Pollen extracts     Lovastatin Rash     Not confirmed but pt skeptical       Medications:    Current Outpatient Medications:     acetaminophen (TYLENOL) 325 MG tablet, Take 325 mg by mouth every 6 (six) hours as needed for Pain., Disp: , Rfl:     BD ULTRA-FINE YULISSA PEN NEEDLE 32 gauge x 5/32" Ndle, To be used with Novolog pen up to 4x a day, Disp: 100 each, Rfl: 3    calcitrioL (ROCALTROL) 1 mcg/mL solution, Take 0.25 mLs (0.25 mcg total) by Per G Tube route once daily., Disp: 15 mL, Rfl: 12    calcium carbonate (TUMS) 200 mg calcium (500 mg) chewable tablet, 2 tablets (1,000 mg total) by Per G Tube route 5 (five) times daily., Disp: 300 tablet, Rfl: 11    calcium carbonate 500 mg/5 mL (1,250 mg/5 mL), 10 mLs (1,000 mg total) by Per G Tube route 4 (four) times daily with meals and nightly., Disp: 473 mL, Rfl: 12    esomeprazole (NEXIUM) 40 MG capsule, 40 mg before breakfast., Disp: , Rfl:     famotidine (PEPCID) 40 mg/5 mL (8 mg/mL) suspension, 2.5 mLs (20 mg total) by Per G Tube route 2 (two) times daily., Disp: 50 mL, Rfl: 11    ibuprofen (ADVIL,MOTRIN) 200 MG tablet, Take 200 mg by mouth every 6 (six) hours as needed for Pain., Disp: , Rfl:     insulin aspart U-100 (NOVOLOG FLEXPEN U-100 INSULIN) 100 unit/mL (3 mL) InPn pen, Inject 0-10 Units into the skin as needed; Add correction scale if needed while on TF.  Blood sugar 180-230 add 2 units, 231-280 +4 units, 281-330 +6 units, 331-380 +8 units, >380 +10 units.  MDD 40 units, Disp: 12 mL, Rfl: 0    lactose-reduced food with fibr (JEVITY 1.5 TRISHA) 0.06 gram-1.5 kcal/mL Liqd, 6 cartons per day via peg.  Flush 60ml before and after each bolus, Disp: 180 each, Rfl: 11    levothyroxine (SYNTHROID) 100 MCG tablet, 1 tablet (100 mcg total) by Per G Tube route before breakfast., Disp: 30 tablet, Rfl: 11    losartan (COZAAR) 100 MG tablet, Take 0.5 tablets (50 " mg total) by mouth once daily. (Patient taking differently: Take 50 mg by mouth every evening.), Disp: 45 tablet, Rfl: 3    metFORMIN (GLUCOPHAGE) 500 MG tablet, Take 1 tablet (500 mg total) by mouth 2 (two) times daily with meals., Disp: 60 tablet, Rfl: 3    ondansetron (ZOFRAN) 8 MG tablet, Take 1 tablet (8 mg total) by mouth every 8 (eight) hours as needed for Nausea., Disp: 30 tablet, Rfl: 2    promethazine (PHENERGAN) 25 MG tablet, Take 1 tablet (25 mg total) by mouth every 4 to 6 hours as needed for Nausea., Disp: 30 tablet, Rfl: 2    traZODone (DESYREL) 50 MG tablet, TAKE 1 TABLET BY MOUTH NIGHTLY AS NEEDED FOR INSOMNIA., Disp: 90 tablet, Rfl: 1    zolpidem (AMBIEN) 5 MG Tab, 1 tablet (5 mg total) by Per G Tube route nightly as needed (difficult with sleep)., Disp: 30 tablet, Rfl: 0  No current facility-administered medications for this visit.    Facility-Administered Medications Ordered in Other Visits:     aprepitant (CINVANTI) injection 130 mg, 130 mg, Intravenous, 1 time in Clinic/HOD, Venu Tamez MD    calcium gluconate 1 g in sodium chloride 0.9% 50 mL, , Intravenous, 1 time in Clinic/HOD, Venu Tamez MD, Last Rate: 50 mL/hr at 02/13/23 0828, New Bag at 02/13/23 0828    CISplatin (Platinol) 40 mg/m2 = 66 mg in sodium chloride 0.9% 631 mL chemo infusion, 40 mg/m2 (Treatment Plan Recorded), Intravenous, 1 time in Clinic/HOD, Venu Tamez MD    heparin, porcine (PF) 100 unit/mL injection flush 500 Units, 500 Units, Intravenous, PRN, Venu Tamez MD    palonosetron 0.25mg/dexAMETHasone 12mg in NS IVPB 0.25 mg 50 mL, 0.25 mg, Intravenous, 1 time in Clinic/HOD, Venu Tamez MD    sodium chloride 0.9% 1,000 mL with magnesium sulfate 1 g, potassium chloride 20 mEq infusion, 150 mL/hr, Intravenous, 1 time in Clinic/HOD, Venu Tamez MD, Last Rate: 150 mL/hr at 02/13/23 0755, 150 mL/hr at 02/13/23 0755    sodium chloride 0.9% bolus 1,000 mL 1,000 mL, 1,000 mL,  "Intravenous, 1 time in Clinic/HOD, Venu Tamez MD    sodium chloride 0.9% flush 10 mL, 10 mL, Intravenous, PRN, Venu Tamez MD    PMHx/PSHx Updates:  See patient's last visit with me on 1/23/2023  See H&P on 9/23/2022        Pathology:   Cancer Staging   Larynx cancer  Staging form: Larynx - Glottis, AJCC 8th Edition  - Clinical stage from 8/6/2020: Stage II (cT2, cN0, cM0) - Signed by Fariha Muñoz NP on 8/6/2020  - Pathologic stage from 1/20/2022: Stage LOU (pT4a, pN0, cM0) - Signed by Fariha Muñoz NP on 1/20/2022  Staging form: Pharynx - P16 Negative Oropharynx, AJCC 8th Edition  - Clinical: Stage II (rcT2, cN0, cM0) - Signed by Matheus Portillo Jr., MD on 5/30/2022    Malignant neoplasm of base of tongue  Staging form: Pharynx - P16 Negative Oropharynx, AJCC 8th Edition  - Clinical stage from 5/20/2022: Stage II (cT2, cN0, cM0, p16-) - Signed by Saúl Kessler MD on 7/7/2022    Dissection 11/9/2022:    Final Pathologic Diagnosis 1. "left submandibular lymph node", dissection:       - Three lymph nodes, negative for carcinoma (0/3)   2. 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       - Tumor size: 4.5 cm       - Resection margins: left superior pharyngeal margin focally involved;   all other margins are negative for carcinoma       - Left internal jugular vein: free of tumor       - One lymph node, negative for carcinoma (0/1)   Note: P16 immunostain is negative     Tumor Site       Oropharynx  involving Base of tongue       Tumor Laterality       Midline       Tumor Size       Greatest Dimension (Centimeters) 4.5 cm         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)       Histologic Grade       G2 to G3: Moderate to Poorly differentiated       Lymphovascular Invasion       Not identified       Perineural Invasion       Not identified     MARGINS      Margins       " Involved by invasive tumor     Margin(s)   Involved by Invasive Tumor:      left superior pharyngeal margin; all other   margins are free of tumor     LYMPH NODES    Regional Lymph Node Status:    :     Regional Lymph Node   Status:      All regional lymph nodes negative for tumor      pT Category:               pT3   pN Category:               pN0      Base of Tongue Biopsy  4/27/2022:  Final Pathologic Diagnosis BIOPSY OF BASE OF THE TONGUE:   THE INFILTRATING POORLY DIFFERENTIATED SQUAMOUS CELL CARCINOMA   THE TUMOR IS P16 NEGATIVE.  THE POSITIVE AND NEGATIVE CONTROLS STAINED   APPROPRIATELY      Larynx resection/thyroidectomy 1/6/2022:     Final Pathologic Diagnosis 1. Lymph nodes, left neck levels 2,  3 and 4, dissection:   - Nineteen lymph nodes, all  negative  for metastatic carcinoma (0/19)   2. Lymph nodes, right neck levels 2,  3 and 4, dissection:   - Twenty-eight lymph nodes, all  negative  for metastatic carcinoma (0/28)   3. Possible parathyroid gland, excision:   - Benign parathyroid gland tissue (0.003 g)   4. 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 Soft tissue, posterior tracheal margin, excision:   - Benign, focally reactive respiratory mucosa with submucosal acute   inflammation,  negative  for dysplasia or malignancy   6. Soft tissue, anterior tracheal margin, excision:   - Benign respiratory mucosa with subepithelial cartilage,  negative  for   dysplasia or malignancy   7. Soft tissue, posterior tracheal margin #2, excision:  "  - Benign, focally reactive respiratory mucosa with submucosal acute   inflammation,  negative  for dysplasia or malignancy   8. Soft tissue, anterior tracheal margin #2, excision:   - Benign, reactive focally ulcerated respiratory mucosa with submucosal acute   inflammation,  negative  for dysplasia or malignancy             Laryngoscope 8/4/2020: (with Dr Noel):  Final Pathologic Diagnosis 1.  Left true vocal fold, biopsy:       -  Invasive moderately differentiated squamous cell carcinoma,   keratinizing type   2.  Left true vocal fold, biopsy:       -  Invasive moderately differentiated squamous cell carcinoma,   keratinizing type             Laryngoscope 3/17/2020 (with Dr Xiao):  Final Pathologic Diagnosis 1.  BIOPSY OF LEFT TRUE VOCAL CORD:   SEVERELY DYSPLASTIC APPEARING SQUAMOUS MUCOSA   INVASIVE CARCINOMA IS NOT DOCUMENTED   ON THE OTHER HAND, THESE FRAGMENTS ARE NODUL AND WITHOUT SUBMUCOSA FOR   EVALUATION; IT IS POSSIBLE THAT THEY REFLECT INVASIVE SQUAMOUS CARCINOMA   2.  BIOPSY OF LEFT ANTERIOR COMMISSURE:   MODERATE DYSPLASIA   NO INVASIVE CARCINOMA IDENTIFIED             Objective:     Vitals:  Blood pressure 123/65, pulse 75, temperature 97.9 °F (36.6 °C), resp. rate 17, height 5' 6" (1.676 m), weight 58.5 kg (129 lb), SpO2 100 %.    Physical Examination:   GEN: no apparent distress, comfortable; AAOx3  HEAD: atraumatic and normocephalic  EYES: no pallor, no icterus, PERRLA  ENT: OMM, no pharyngeal erythema, external ears WNL; no nasal discharge; no thrush; s/p laryngectomy with flap since healed well; trach   NECK: no masses, thyroid normal, trachea midline, no LAD/LN's, supple; post-op dissection healed well;    CV: RRR with no murmur; normal pulse; normal S1 and S2; no pedal edema; portacath   CHEST: Normal respiratory effort; CTAB; normal breath sounds; no wheeze or crackles  ABDOM: nontender and nondistended; soft; normal bowel sounds; no rebound/guarding; peg tube  MUSC/Skeletal: ROM normal; " no crepitus; joints normal; no deformities or arthropathy  EXTREM: no clubbing, cyanosis, inflammation or swelling  SKIN: no rashes, lesions, ulcers, petechiae or subcutaneous nodules  : no mahan  NEURO: grossly intact; motor/sensory WNL; AAOx3; no tremors  PSYCH: normal mood, affect and behavior  LYMPH: normal cervical, supraclavicular, axillary and groin LN's          Labs:     Lab Results   Component Value Date    WBC 3.74 (L) 02/10/2023    HGB 10.9 (L) 02/10/2023    HCT 32.9 (L) 02/10/2023    MCV 93 02/10/2023     02/10/2023     CMP  Sodium   Date Value Ref Range Status   02/10/2023 137 136 - 145 mmol/L Final     Potassium   Date Value Ref Range Status   02/10/2023 4.2 3.5 - 5.1 mmol/L Final     Chloride   Date Value Ref Range Status   02/10/2023 101 95 - 110 mmol/L Final     CO2   Date Value Ref Range Status   02/10/2023 26 23 - 29 mmol/L Final     Glucose   Date Value Ref Range Status   02/10/2023 103 70 - 110 mg/dL Final     BUN   Date Value Ref Range Status   02/10/2023 25 (H) 8 - 23 mg/dL Final     Creatinine   Date Value Ref Range Status   02/10/2023 0.9 0.5 - 1.4 mg/dL Final     Calcium   Date Value Ref Range Status   02/10/2023 7.8 (L) 8.7 - 10.5 mg/dL Final     Total Protein   Date Value Ref Range Status   02/10/2023 7.3 6.0 - 8.4 g/dL Final     Albumin   Date Value Ref Range Status   02/10/2023 4.4 3.5 - 5.2 g/dL Final   11/18/2020 3.7 3.6 - 5.1 g/dL Final     Comment:     For additional information, please refer to   http://education.[facility]/faq/LQW848 (This link is   being provided for informational/ educational purposes only.)  This test was developed and its analytical performance   characteristics have been determined by [facility]. It has not been cleared or approved by the   [place] Food and Drug Administration. This assay has been validated   pursuant to the CLIA regulations and is used for clinical   purposes.  @ Test Performed By:  [facility]  Yo Cortes M.D.,   [address] [place] [address]  [facility]  [id number]       Total Bilirubin   Date Value Ref Range Status   02/10/2023 1.1 (H) 0.1 - 1.0 mg/dL Final     Comment:     For infants and newborns, interpretation of results should be based  on gestational age, weight and in agreement with clinical  observations.    Premature Infant recommended reference ranges:  Up to 24 hours.............<8.0 mg/dL  Up to 48 hours............<12.0 mg/dL  3-5 days..................<15.0 mg/dL  6-29 days.................<15.0 mg/dL       Alkaline Phosphatase   Date Value Ref Range Status   02/10/2023 378 (H) 55 - 135 U/L Final     AST   Date Value Ref Range Status   02/10/2023 58 (H) 10 - 40 U/L Final     ALT   Date Value Ref Range Status   02/10/2023 106 (H) 10 - 44 U/L Final     Anion Gap   Date Value Ref Range Status   02/10/2023 10 8 - 16 mmol/L Final     eGFR if    Date Value Ref Range Status   07/29/2022 >60.0 >60 mL/min/1.73 m^2 Final     eGFR if non    Date Value Ref Range Status   07/29/2022 >60.0 >60 mL/min/1.73 m^2 Final     Comment:     Calculation used to obtain the estimated glomerular filtration  rate (eGFR) is the CKD-EPI equation.                  Radiology/Diagnostic Studies:      CTA Neck    Result Date: 9/20/2022  EXAMINATION: CTA NECK CLINICAL HISTORY: Head/neck cancer, monitor;Malignant neoplasm of base of tongue TECHNIQUE: CT angiogram was performed from the level of the scott to the EAC following the IV administration of 75mL of Omnipaque 350.   Sagittal and coronal reconstructions and maximum intensity projection reconstructions were performed. Arterial stenosis percentages are based on NASCET measurement criteria. COMPARISON: CTA neck dated 05/20/2022 FINDINGS: Aortic arch and great vessels: There is a conventional left-sided 3 vessel arch.  The aortic arch is widely patent.  There is stable soft and  calcified plaque in the proximal left subclavian artery with a similar appearance the prior study and possibly within radiation field but without critical stenosis or occlusion.  The right subclavian artery is patent.  The brachiocephalic trunk and origin of the left common carotid artery are patent. Carotid arteries Right carotid artery: There is calcified plaque scattered in the right common carotid artery without critical stenosis, occlusion or change.  There is no measurable stenosis of the internal carotid artery which is patent to the skull base. Left carotid artery: There is mild plaque scattered in the left common carotid artery without change and without critical stenosis or occlusion.  There is no measurable stenosis of the internal carotid artery. Vertebral arteries: The left vertebral artery is dominant and patent throughout its course in the neck.  The right vertebral artery is hypoplastic but patent.  There is no critical stenosis, occlusion, thrombus or dissection.  There is no change. The study extends intracranially.  There is calcified plaque in the V4 segment of the left vertebral artery resulting in a moderate to marked short segment nonocclusive stenosis.  The right vertebral artery partially terminates in a posteroinferior cerebellar artery with a short segment moderate to marked stenosis of the very distal right vertebral artery.  Some flow within the distal right vertebral artery may represent retrograde flow from the dominant left vertebral artery.  The basilar artery is patent.  The origins of the superior cerebellar and posterior cerebral artery on the right are patent.  There is fetal origin of the left posterior cerebral artery which is patent. There is plaque in the cavernous segments of both internal carotid arteries but there is no critical stenosis or large vessel occlusion of the anterior circulation vessels.  There is no large aneurysm. Other: There is a similar appearance of the  included upper lungs with pleural and parenchymal changes anteriorly in the upper lobes bilaterally.  As previously discussed, timing of the contrast bolus is performed during the arterial phase for CTA neck.  Therefore, enhancement of the soft tissues is somewhat suboptimal due to this technique. There has been a large region of soft tissue resection in the anterior mid and lower neck soft tissues with continued open defect in the upper chest and lower neck above the manubrium.  Soft tissue seen along the left posterolateral border of the trachea could represent secretions or mucus.  This was present previously. Redemonstrated is a large heterogeneously enhancing lesion centered at the level of the tongue base and floor of the mouth centrally into the left.  There is scattered calcifications.  The prior CTA demonstrated regions of central necrosis which are no longer definitely present but diffusely heterogeneous density and enhancement likely represents regions of necrosis.  This large heterogeneously enhancing soft tissue mass extends more anteriorly in the floor of the mouth on the left and measures at least 5.6 cm in greatest anterior to posterior dimension with 3.6 cm transverse dimension.  This does extend anteriorly to the medial margin of the body of the mandible on the left. There are post treatment changes with stranding in the neck fat.     1. Similar appearance of the neck vessels when compared to the prior CTA neck with mild narrowing at the origin of the left subclavian artery.  There is no critical stenosis, occlusion, thrombosis or dissection involving the cervical vertebral or carotid arteries. 2. Larger mass within the hypopharynx and tongue base extending anteriorly to the floor of the mouth on the left to the medial margin of the body of the mandible without obvious bony destruction. 3. There is nonocclusive stenosis of the distal V4 segment of the left vertebral artery without change. 4. There  is no large vessel occlusion or critical stenosis of the anterior circulation. 5. There is developmental variation with fetal origin of the left posterior cerebral artery. Electronically signed by: Rex Joyner MD Date:    09/20/2022 Time:    11:31    CT Abdomen Pelvis With Contrast    Result Date: 9/1/2022  CMS MANDATED QUALITY DATA - CT RADIATION - 436 All CT scans at this facility utilize dose modulation, iterative reconstruction, and/or weight based dosing when appropriate to reduce radiation dose to as low as reasonably achievable. Reason: abdominal pain partial history of laryngeal carcinoma TECHNIQUE: CT abdomen and pelvis with 100 mL Omnipaque 350. COMPARISON: PET/CT 5/13/2022 CT ABDOMEN: Coronary artery calcification and extremely tiny hiatal hernia incidentally noted. Visualized lung bases are clear. Liver, gallbladder, pancreas, spleen, adrenals, and kidneys are normal. Mild aortoiliac calcifications present. Gastrostomy tube tip lies in distal gastric body. Small intestines are unremarkable. Mild wall thickening affects the ascending colon with pneumatosis intestinalis diffusely affecting the ascending colon. No other free intraperitoneal gas or, and remainder of colon is normal. A normal appendix is present. The major mesenteric vascular structures are patent. No acute osseous abnormality. CT PELVIS: Prostate is slightly enlarged. Bladder is normal. No free pelvic fluid. Tiny right and small to moderate left fat-containing inguinal hernias are present. No acute osseous abnormality. IMPRESSION: 1. Pneumatosis intestinalis affecting the ascending colon with associated mild wall thickening. Etiology of this is undetermined. Potential considerations include intestinal ischemia or pneumatosis related to chemotherapy. Clinical and laboratory correlation is needed to assess significance. No portal venous gas or free intraperitoneal air however. 2. Coronary artery calcifications Electronically signed by:   Nael Hui MD  9/1/2022 6:20 PM CDT Workstation: [id number]    NM PET CT Routine Skull to Mid Thigh    Result Date: 9/27/2022  PET CT WITH IMAGE FUSION HISTORY:  restage tongue cancer RESTAGING...  HX: TONGUE CA.  DX: April 2022.  LAST CHEMO TREATMENT WAS 3WKS AGO.  EVALUATE TX RESPONSE.   ALSO HX OF LARYNGEAL CA IN AUGUST 2020.  MULTIPLE SURGERIES: LARYNGECTOMY, THYROIDECTOMY & TRACHEOSTOMY.  RAD TX WAS COMPLETED IN NOV 2020. TECHNIQUE: Following IV administration of 11.2 mCi of F-18 labeled FDG into right antecubital fossa and a 60 minute delay, PET CT was performed from the vertex of the skull through the proximal thighs with an integrated PET CT scanner with image fusion. CT images were obtained to aid in attenuation correction and PET localization. The patient's serum glucose at the time of the exam was 136 mg/dL. COMPARISON: PET/CT 5/13/2022 FINDINGS: Poorly characterized soft tissue mass involving base of tongue approximately measures 4.3 x 2.8 cm (series 3 image 65) appearing similar to the prior exam. This reaches current max SUV of 11.8, not significantly changed from prior of 11.4. No other abnormal FDG activity. Intracranial compartment is unremarkable. Trace bilateral maxillary sinus mucosal thickening is present. Postoperative changes of laryngectomy and tracheostomy are present. Surgical clips occur throughout the neck. No definite enlarged cervical or supraclavicular lymph node, with evaluation limited by lack of IV contrast. Left subclavian port catheter tip terminates in SVC. Diffuse coronary artery calcifications are present. No enlarged mediastinal or axillary lymph nodes. No pulmonary nodule or mass. Gastrostomy tube tip lies in gastric body. Moderate aortoiliac calcifications are present. Small fat-containing left inguinal hernia incidentally noted. Mild degenerative changes affect the spine. No suspicious osseous abnormality. IMPRESSION: 1. No significant change in the FDG avid mass  involving the tongue base, remaining characteristic of malignancy. 2. No new FDG avid malignancy or metastatic disease. Electronically signed by:  Nael Hui MD  9/27/2022 2:38 PM CDT Workstation: [id number]    CT Abdomen Pelvis  Without Contrast    Result Date: 9/4/2022  CMS MANDATED QUALITY DATA - CT RADIATION  436 All CT scans at this facility utilize dose modulation, iterative reconstruction, and/or weight based dosing when appropriate to reduce radiation dose to as low as reasonably achievable. CT ABDOMEN PELVIS WITHOUT IV CONTRAST CLINICAL HISTORY: 72 years Male Follow-up pneumatosis COMPARISON: CT abdomen and pelvis September 1, 2022 FINDINGS: Imaging through the lower thorax demonstrates subsegmental atelectasis of the dependent lower lobes. Coronary artery calcification. Bone window images show no acute or aggressive osseous abnormality. Transitional lumbosacral vertebral body. On this unenhanced exam, no focal hepatic lesion. Gallbladder and biliary tree are unremarkable. Spleen appears normal. Pancreas is unremarkable. No adrenal lesion. No renal calculi or hydronephrosis. Ureters are normal in caliber. Urinary bladder is within normal limits. Gastrostomy tube is in place within the stomach. Stomach is largely collapsed. No evidence of small bowel obstruction. Pneumatosis and pericolonic abscess involving the ascending colon is redemonstrated, slightly diminished compared to prior. Mild wall thickening throughout the colon. No free fluid or free air within the abdomen or pelvis. Aortoiliac atherosclerotic calcification. No pathologically enlarged lymph nodes within the abdomen or pelvis. Bilateral fat-containing inguinal hernias, larger on the left. IMPRESSION: Pneumatosis and pericolonic gas about the ascending colon has decreased in volume compared to prior. Persistent colonic wall thickening which could indicate colitis. Coronary and aortic atherosclerosis. Gastrostomy tube is within the stomach.  Bilateral inguinal hernias. Electronically signed by:  Jose De Jesus Oleary MD  9/4/2022 4:06 PM CDT Workstation: [id number]    CTA neck  5/20/2022:     IMPRESSION:  Persistent mass within the hypopharynx consistent with malignancy.  By my measurements it is larger than on April 24, 2022 with central necrosis.  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 This is similar to the previous April 2022 study.  No evidence of arterial compromise related to malignancy.     PET 5/13/2022:     IMPRESSION:  1. Postoperative changes of prior laryngectomy and lymph node resection/radiation.  2. Masslike FDG avid lesion to the left of midline at the tongue base concerning for residual/recurrent disease.  3. Adjacent confluent nodular extension along the inferior left side of the mass.  4. No FDG avid lymphadenopathy or convincing additional sites of disease in the chest, abdomen or pelvis.     CT head 5/10/2022:  FINDINGS: Comparison to multiple prior exams. There is no acute intracranial hemorrhage, with no mass effect or abnormal extra-axial fluid. Mild scattered areas of nonspecific hypoattenuation involve the deep periventricular white matter, with gray-white differentiation maintained.     There is mild generalized prominence of the cortical sulci and ventricles. The cerebellum and brainstem are unremarkable. There are carotid siphon vascular calcifications. The visualized paranasal sinuses and mastoid air cells are clear. There is no acute calvarial fracture or scalp hematoma.     IMPRESSION: No acute intracranial hemorrhage or acute calvarial fracture     CT soft neck 4/24/2022;     Oral tongue/tongue base:  At the base of the tongue on the left there are findings of a heterogeneously enhancing mass measuring 2.1 cm highly concerning for a neoplastic process.     True and false cords:  Patient status post laryngectomy which has been performed in the interim. Soft  tissue stranding in the lower neck likely related to a previous flat reconstruction. No enhancement or lymphadenopathy within the lower neck.     Lymph node assessment:Negative     Surrounding soft tissues:  Negative     Vasculature:  Negative     Osseous structures:  Negative     Lung apices:  Scarring involving the lung apices bilaterally likely related to previous radiation.     IMPRESSION:  1. Postoperative and radiation changes involving the lower neck.  2. Findings highly concerning for a developing mass at the left base of the tongue enhancing 2.1 cm region. Direct visualization in this region would be of benefit.        CT soft neck  12/24/2021  IMPRESSION:  1.  Laryngeal mass as on prior exam. Laryngeal airway narrowing has not changed.  2.  No evidence for active hemorrhage.  3.  Partially visualized patchy groundglass infiltrates in both upper lobes. Also see CT chest report     CTA Chest 12/24/2021:  IMPRESSION:     1.  No pulmonary embolism.     2.  Soft tissue stranding in the region of the strap musculature with ill-defined hypodensity measuring 2.8 x 1.4 cm in the cervical midline.  Findings concerning for infectious process or abscess. Neoplastic process could have similar imaging characteristics.     3.  Suggested erosion of the proximal right parasymphyseal aspect of the thyroid shield.  Correlated with CT soft tissue neck findings. Findings could represent osteomyelitis. Neoplastic process cannot be excluded.     4.  Hepatic steatosis.  5.  Thickening of the gastric wall. Prominence of perigastric vasculature. Small hiatal hernia.     6.  Moderate stool burden.  Correlate for constipation.        PET 12/15/2021:  IMPRESSION:     Substantial qualitative and quantitative increase of hypermetabolic FDG activity associated with the larynx in this patient with known supraglottic neoplasm.     No evidence of FDG avid metastatic disease involving neck, chest, abdomen or pelvis.     PET 8/21/2020:      Impression:     1. Intensely hypermetabolic plaque-like mass along the left true vocal cord, compatible with known laryngeal carcinoma.  2. No findings of regional metastatic disease in the neck, or distant metastatic disease.        CT Chest 8/10/2020:     IMPRESSION:     No metastatic disease within the chest.  Three-vessel coronary artery calcification. Nondilated cardiac  chambers     CT Soft neck 7/22/2020:  IMPRESSION:  1. Slight interval increase in size of the previously described  enhancing nodule along the anterior left vocal cord.  2. No pathologic lymphadenopathy.  3. Additional and incidental findings as noted above.     CT Soft neck  3/16/2020:     Impression:     6 mm enhancing focus involving the superior aspect of the left focal cord near the anterior commisure.  Recommend direct visualization for further evaluation of laryngeal polyp     Question of 2 mm submucosal lipoma involving the midportion of the superior aspect of the left vocal cord.     Mild arteriosclerosis involving the brachiocephalic arteries and carotid arteries     Mild degenerative change of the cervical spine        I have reviewed all available lab results and radiology reports.    Assessment/Plan:   (1) 72 y.o. 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     - Patient has been under the care of Dr Khoobehi with [facility] and Dr Portillo with rad/onc.   - He was recently found to have a new primary cancer involving the base of his tongue in April 2022. He was deemed not to be a candidate for any further radiation and did not want to undergo any further surgery as this would necessitate a glossectomy procedure.   - He has been on adjuvant chemotherapy per direction of Dr Khoobehi and has had 3 cycles. His therapy course has been complicated with persistent diarrhea and N/V.      9/23/2022:  - s/p  biopsy base of tongue on 4/27/2022  - infiltrating poorly differentiated SCC CA which was P16 negative  - cT2cN0  - he has been on carbo/pembro and 5FU and has had three cycles since July 2022  - recent CTA of neck with enlargement of the mass  - will set up PET and ask rad/onc to re-evaluate for XRT options  - NCCN guidelines reviewed and on chart (version 2.2022)    9/29/2022:  - He is here with his sister-in-law today. He saw Dr Lucero with Rad/onc this am. They are going to present his case to H&N Tumor Board at [facility] and plans to see Dr James about surgical options. If he is not a surgical candidate then will proceed with cisplatin and XRT combine therapy.     10/6/2022:  - He saw Dr Lucero with Rad/onc, and Dr James and his case was presented to H&N Tumor Board at [facility] and  he general consensus was to proceed to surgery.  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   - He had his portacath removed on 11/28.   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, keratinizing type, moderate to poorly differentiated tumor  - Four LN's were all negative  - he did have a positive left superior pharyngeal margin  - pT3 pN0  - reviewed the latest NCCN guidelines again from Version 1.2023; he may need some further systemic therapy due to the margin  - check on Rad/onc follow-up     1/23/2023:  - s/p new portacath placed on 1/12/2023 with Dr Le  - starting combined chemotherapy and XRT - cisplatin  - s/p chemotherapy school with  Whit    2/6/2023:  - he seems to be tolerating the chemotherapy well at this time  - continued with concomitant therapy with XRT    2/13/2023:   -doing very well with Chemotherapy   - concurrent XRT   - IV calcium with IV fluids and increase PEG calcium to 4 times daily.         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 He had a CT scan on 3/16/2020 which showed a 6mm nodule on the left vocal cord. A repeat Ct scan four months later on 7/22/2020 showed the nodule enlarged to 1.4 x 1.1 cm in size. Patient was then seen by Dr Noel and underwent repeat scope in Aug 2020 who found a bulky deeply invading tumor which was debulked and the pathology coming back moderately differentiated SCCA without lymphovascular or perineural invasion. Hs case was subsequently presented to the tumor board and the consensus was to proceed with radiation. He completed XRT on 10/30/2020 and proceeded with regular laryngoscopy surveillance. He eventually required salvage laryngectomy and neck dissection with flap with Dr James on Jan 6th 2022. 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 Fifty lymph nodes were removed which were all negative. Pathology TNM was pT4a, pN0. Patient did not require any adjuvant therapy afterwards.      (2) HTN and hypercholesterolemia     (3) Paroxysmal atrial fibrillation, V tach     (4) DM II     (5) B12 deficiency      (6) Fatty liver disease, GERD           VISIT DIAGNOSES:      Laryngeal cancer    Malignant neoplasm of base  of tongue    Hypocalcemia      PLAN:  Continued with systemic concomitant therapy with Cisplat and XRT - cycle 3 today    s/p chemotherapy school with Briana - discussed the side-effect profile, provided literature and obtained consents  F/u Rad/onc follow-up as directed  F/u with Dr James with ENT and Dr Lucero with rad/onc  Check labs weekly - add IV calcium as needed   F/u with PCP, ENT, etc  Dietician f/u on the tube feeds     RTC in 1 week with Joi and 2 weeks with me        Discussion:     COVID-19 Discussion:     I had long discussion with patient and any applicable family about the COVID-19 coronavirus epidemic and the recommended precautions with regard to cancer and/or hematology patients. I have re-iterated the CDC recommendations for adequate hand washing, use of hand -like products, and coughing into elbow, etc. In addition, especially for our patients who are on chemotherapy and/or our otherwise immunocompromised patients, I have recommended avoidance of crowds, including movie theaters, restaurants, churches, etc. I have recommended avoidance of any sick or symptomatic family members and/or friends. I have also recommended avoidance of any raw and unwashed food products, and general avoidance of food items that have not been prepared by themselves. The patient has been asked to call us immediately with any symptom developments, issues, questions or other general concerns.         Pathology Discussion:     I reviewed and discussed the pathology report(s) and radiograph reports (if available) in as simple to understand and/or laymen's terms to the best of my ability. I had an indepth conversation with the patient and went over the patient's individual diagnosis based on the information that was currently available. I discussed the TNM staging process with regard to the patient's particular cancer type, and the calculated stage based on the currently available TNM data and literature.  "I discussed the available prognostic data with regard to the current staging information and how it relates to the prognosis of their particular neoplastic process.          NCCN Guidelines:     I discussed the available treatment option(s) in accordance with the latest literature from the NCCN Clinical Practice Guidelines for the patient's particular type of cancer disorder. The NCCN Guidelines provide a "document evidence-based (and) consensus-driven management" of the care of oncology patients. The treatment recommendations were made not only in accordance to the NCCN guidelines, but also factored in to account the patient's overall age, condition, performance status and their medical co-morbidities. I went over the risks and benefits of the the treatment options (if any could be made) with regard to their particular cancer type, their cancer stage, their age, and their co-morbidities.         Chemotherapy Discussion:      I discussed the available treatment option(s) in accordance with the latest/current national evidence-based guidelines (NCCN, UpToDate, NCI, ASCO, etc where applicable), their overall age/condition and their co-morbidities. I also went over the risks and benefits of the chemotherapy with regard to their particular cancer type, their cancer stage, their age/condition, and their co-morbidities. I provided literature on the chemotherapy regimen and discussed the chemotherapy side-effect profiles of the drug(s). I discussed the importance of compliance with obtaining and monitoring weekly lab work, and went over the potential hematopathology issues and risks with anemia, leucopenia and thrombocytopenia that can occur with chemotherapy. I discussed the potential risks of liver and kidney damage, which could be permanent and could necessitate dialysis long-term if kidney failure developed. I discussed the emetic and/or diarrheal potential of the regimen and the potential need for use of antiemetic " and anti-diarrheal medications. I discussed the risk for development of anaphylactic shock, bronchospasm, dysrhythmia, and respiratory/cardiovascular arrest and/or failure. I discussed the potential risks for development of alopecia, cold sensory issues, ringing in ears, vertigo, cataracts, glaucoma, and neuropathy, all of which could end up being chronic and life-long. Some chemotherpyI discussed the risks of hand-foot syndrome and rashes, and development of other autoimmune mediated processes such as pneumonitis, hepatitis, and colitis which could be life threatening. I discussed the risks of the potential development of a rare but fatal viral mediated disease known as PML (Progressive Multifocal Leukoencephalopathy), and risk of future development of leukemia and/or lymphoma from use of certain chemotherapy agents. I discussed the need for neutropenic precautions, basic hygiene/sanitation behaviors and dietary restrictions.    The patient's consent has been obtained to proceed with the chemotherapy.The patient will be referred to Chemotherapy School /[facility] for training and education on chemotherapy, use of antiemetics and/or anti-diarrheals, use of NSAID's, potential chemotherapy side-effects, and any specific recommendations and precautions with the particular chemotherapy agents.      I answered all of the patient's (and family's, if applicable) questions to the best of my ability and to their complete satisfaction. The patient acknowledged full understanding of the risks, recommendations and plan(s).     I have explained and the patient understands all of  the current recommendation(s). I have answered all of their questions to the best of my ability and to their complete satisfaction.        I have explained all of the above in detail and the patient understands all of the current recommendation(s). I have answered all of their questions to the best of my ability and to their complete  satisfaction.   The patient is to continue with the current management plan.            Electronically signed by Briana Martinez NP

## 2023-02-13 NOTE — PLAN OF CARE
Problem: Fatigue  Goal: Improved Activity Tolerance  Outcome: Ongoing, Progressing  Intervention: Promote Improved Energy  Flowsheets (Taken 2/13/2023 7523)  Fatigue Management:   fatigue-related activity identified   frequent rest breaks encouraged   paced activity encouraged  Sleep/Rest Enhancement: relaxation techniques promoted  Activity Management: Ambulated -L4

## 2023-02-16 RX ORDER — HEPARIN 100 UNIT/ML
500 SYRINGE INTRAVENOUS
Status: CANCELLED | OUTPATIENT
Start: 2023-02-20

## 2023-02-16 RX ORDER — SODIUM CHLORIDE 0.9 % (FLUSH) 0.9 %
10 SYRINGE (ML) INJECTION
Status: CANCELLED | OUTPATIENT
Start: 2023-02-20

## 2023-02-16 NOTE — PROGRESS NOTES
[facility] Hematology/Oncology  PROGRESS NOTE -  Follow-up Visit      Subjective:       Patient ID:   NAME: Cyrus Batres Jr. : 1950     72 y.o. 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  The patient is here today to go over the results of the recently ordered labs, tests and studies. He is here by himself.    He is getting chemotherapy again today and continues with the XRT. He has healed up well from his prior surgery.  He seems to be tolerating the regimen fairly well with no new complaints. 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 He had his portacath removed on  and replaced on 2023 by Dr Le    He is breathing ok. No HA's or CP; no pain at this time        Discussed covid19 and he has been vaccinated      ROS:   GEN: normal without any fever, night sweats or weight loss; doing well with tube feeds   HEENT: normal with no HA's, sore throat, stiff neck, changes in vision  CV: normal with no CP, SOB, PND, GRIFFIN or orthopnea  PULM: normal with no SOB, cough, hemoptysis, sputum or pleuritic pain  GI: no current N/V  ; has peg tube;    : normal with no hematuria, dysuria  BREAST: normal with no mass, discharge, pain  SKIN: normal with no rash, erythema, bruising, or swelling     Pain Scale:  0    Allergies:  Review of  "patient's allergies indicates:   Allergen Reactions    Lovastatin Itching    Pollen extracts     Lovastatin Rash     Not confirmed but pt skeptical       Medications:    Current Outpatient Medications:     acetaminophen (TYLENOL) 325 MG tablet, Take 325 mg by mouth every 6 (six) hours as needed for Pain., Disp: , Rfl:     BD ULTRA-FINE YUILSSA PEN NEEDLE 32 gauge x 5/32" Ndle, To be used with Novolog pen up to 4x a day, Disp: 100 each, Rfl: 3    calcitrioL (ROCALTROL) 1 mcg/mL solution, Take 0.25 mLs (0.25 mcg total) by Per G Tube route once daily., Disp: 15 mL, Rfl: 12    calcium carbonate (TUMS) 200 mg calcium (500 mg) chewable tablet, 2 tablets (1,000 mg total) by Per G Tube route 5 (five) times daily., Disp: 300 tablet, Rfl: 11    calcium carbonate 500 mg/5 mL (1,250 mg/5 mL), 10 mLs (1,000 mg total) by Per G Tube route 4 (four) times daily with meals and nightly., Disp: 473 mL, Rfl: 12    calcium carbonate 500 mg/5 mL (1,250 mg/5 mL), 10 mLs (1,000 mg total) by Per G Tube route 4 (four) times daily with meals and nightly., Disp: 1200 mL, Rfl: 12    esomeprazole (NEXIUM) 40 MG capsule, 40 mg before breakfast., Disp: , Rfl:     famotidine (PEPCID) 40 mg/5 mL (8 mg/mL) suspension, 2.5 mLs (20 mg total) by Per G Tube route 2 (two) times daily., Disp: 50 mL, Rfl: 11    ibuprofen (ADVIL,MOTRIN) 200 MG tablet, Take 200 mg by mouth every 6 (six) hours as needed for Pain., Disp: , Rfl:     lactose-reduced food with fibr (JEVITY 1.5 TRISHA) 0.06 gram-1.5 kcal/mL Liqd, 6 cartons per day via peg.  Flush 60ml before and after each bolus, Disp: 180 each, Rfl: 11    levoFLOXacin (LEVAQUIN) 750 MG tablet, Take 750 mg by mouth. for seven days, Disp: , Rfl:     levothyroxine (SYNTHROID) 100 MCG tablet, 1 tablet (100 mcg total) by Per G Tube route before breakfast., Disp: 30 tablet, Rfl: 11    losartan (COZAAR) 100 MG tablet, Take 0.5 tablets (50 mg total) by mouth once daily. (Patient taking differently: Take 50 mg by mouth every " "evening.), Disp: 45 tablet, Rfl: 3    metFORMIN (GLUCOPHAGE) 500 MG tablet, Take 1 tablet (500 mg total) by mouth 2 (two) times daily with meals., Disp: 60 tablet, Rfl: 3    traZODone (DESYREL) 50 MG tablet, TAKE 1 TABLET BY MOUTH NIGHTLY AS NEEDED FOR INSOMNIA., Disp: 90 tablet, Rfl: 1    zolpidem (AMBIEN) 5 MG Tab, 1 tablet (5 mg total) by Per G Tube route nightly as needed (difficult with sleep)., Disp: 30 tablet, Rfl: 0    insulin aspart U-100 (NOVOLOG FLEXPEN U-100 INSULIN) 100 unit/mL (3 mL) InPn pen, Inject 0-10 Units into the skin as needed; Add correction scale if needed while on TF.  Blood sugar 180-230 add 2 units, 231-280 +4 units, 281-330 +6 units, 331-380 +8 units, >380 +10 units.  MDD 40 units, Disp: 12 mL, Rfl: 0    PMHx/PSHx Updates:  See patient's last visit with me on 2/6/2023  See H&P on 9/23/2022        Pathology:   Cancer Staging   Larynx cancer  Staging form: Larynx - Glottis, AJCC 8th Edition  - Clinical stage from 8/6/2020: Stage II (cT2, cN0, cM0) - Signed by Fariha Muñoz NP on 8/6/2020  - Pathologic stage from 1/20/2022: Stage LOU (pT4a, pN0, cM0) - Signed by Fariha Muñoz NP on 1/20/2022  Staging form: Pharynx - P16 Negative Oropharynx, AJCC 8th Edition  - Clinical: Stage II (rcT2, cN0, cM0) - Signed by Matheus Portillo Jr., MD on 5/30/2022    Malignant neoplasm of base of tongue  Staging form: Pharynx - P16 Negative Oropharynx, AJCC 8th Edition  - Clinical stage from 5/20/2022: Stage II (cT2, cN0, cM0, p16-) - Signed by Saúl Kessler MD on 7/7/2022    Dissection 11/9/2022:    Final Pathologic Diagnosis 1. "left submandibular lymph node", dissection:       - Three lymph nodes, negative for carcinoma (0/3)   2. 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       - Tumor size: 4.5 cm       - Resection margins: left superior pharyngeal margin focally involved;   all other margins " are negative for carcinoma       - Left internal jugular vein: free of tumor       - One lymph node, negative for carcinoma (0/1)   Note: P16 immunostain is negative     Tumor Site       Oropharynx  involving Base of tongue       Tumor Laterality       Midline       Tumor Size       Greatest Dimension (Centimeters) 4.5 cm         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)       Histologic Grade       G2 to G3: Moderate to Poorly differentiated       Lymphovascular Invasion       Not identified       Perineural Invasion       Not identified     MARGINS      Margins       Involved by invasive tumor     Margin(s)   Involved by Invasive Tumor:      left superior pharyngeal margin; all other   margins are free of tumor     LYMPH NODES    Regional Lymph Node Status:    :     Regional Lymph Node   Status:      All regional lymph nodes negative for tumor      pT Category:               pT3   pN Category:               pN0      Base of Tongue Biopsy  4/27/2022:  Final Pathologic Diagnosis BIOPSY OF BASE OF THE TONGUE:   THE INFILTRATING POORLY DIFFERENTIATED SQUAMOUS CELL CARCINOMA   THE TUMOR IS P16 NEGATIVE.  THE POSITIVE AND NEGATIVE CONTROLS STAINED   APPROPRIATELY      Larynx resection/thyroidectomy 1/6/2022:     Final Pathologic Diagnosis 1. Lymph nodes, left neck levels 2,  3 and 4, dissection:   - Nineteen lymph nodes, all  negative  for metastatic carcinoma (0/19)   2. Lymph nodes, right neck levels 2,  3 and 4, dissection:   - Twenty-eight lymph nodes, all  negative  for metastatic carcinoma (0/28)   3. Possible parathyroid gland, excision:   - Benign parathyroid gland tissue (0.003 g)   4. Larynx, thyroid and bilateral level 6 lymph nodes, total laryngectomy,   total thyroidectomy and bilateral level 6 lymph node dissection:   - Invasive, well to moderately differentiated, keratinizing squamous cell   carcinoma (4.2 cm)   - Left lobe of thyroid gland,  positive  for invasive squamous cell  "carcinoma   - Margins  negative  for invasive carcinoma or dysplasia   - Three lymph nodes,  negative  for metastatic carcinoma (0/3)   - See synoptic report below for details and complete pathologic staging   5. Soft tissue, posterior tracheal margin, excision:   - Benign, focally reactive respiratory mucosa with submucosal acute   inflammation,  negative  for dysplasia or malignancy   6. Soft tissue, anterior tracheal margin, excision:   - Benign respiratory mucosa with subepithelial cartilage,  negative  for   dysplasia or malignancy   7. Soft tissue, posterior tracheal margin #2, excision:   - Benign, focally reactive respiratory mucosa with submucosal acute   inflammation,  negative  for dysplasia or malignancy   8. Soft tissue, anterior tracheal margin #2, excision:   - Benign, reactive focally ulcerated respiratory mucosa with submucosal acute   inflammation,  negative  for dysplasia or malignancy             Laryngoscope 8/4/2020: (with Dr Noel):  Final Pathologic Diagnosis 1.  Left true vocal fold, biopsy:       -  Invasive moderately differentiated squamous cell carcinoma,   keratinizing type   2.  Left true vocal fold, biopsy:       -  Invasive moderately differentiated squamous cell carcinoma,   keratinizing type             Laryngoscope 3/17/2020 (with Dr Xiao):  Final Pathologic Diagnosis 1.  BIOPSY OF LEFT TRUE VOCAL CORD:   SEVERELY DYSPLASTIC APPEARING SQUAMOUS MUCOSA   INVASIVE CARCINOMA IS NOT DOCUMENTED   ON THE OTHER HAND, THESE FRAGMENTS ARE NODUL AND WITHOUT SUBMUCOSA FOR   EVALUATION; IT IS POSSIBLE THAT THEY REFLECT INVASIVE SQUAMOUS CARCINOMA   2.  BIOPSY OF LEFT ANTERIOR COMMISSURE:   MODERATE DYSPLASIA   NO INVASIVE CARCINOMA IDENTIFIED             Objective:     Vitals:  Blood pressure 97/63, pulse 73, temperature 97.6 °F (36.4 °C), resp. rate 18, height 5' 6" (1.676 m), weight 58.1 kg (128 lb), SpO2 98 %.    Physical Examination:   GEN: no apparent distress, comfortable; AAOx3  HEAD: " atraumatic and normocephalic  EYES: no pallor, no icterus, PERRLA  ENT: OMM, no pharyngeal erythema, external ears WNL; no nasal discharge; no thrush; s/p laryngectomy with flap since healed well; trach   NECK: no masses, thyroid normal, trachea midline, no LAD/LN's, supple; post-op dissection healed well;    CV: RRR with no murmur; normal pulse; normal S1 and S2; no pedal edema; portacath   CHEST: Normal respiratory effort; CTAB; normal breath sounds; no wheeze or crackles  ABDOM: nontender and nondistended; soft; normal bowel sounds; no rebound/guarding; peg tube  MUSC/Skeletal: ROM normal; no crepitus; joints normal; no deformities or arthropathy  EXTREM: no clubbing, cyanosis, inflammation or swelling  SKIN: no rashes, lesions, ulcers, petechiae or subcutaneous nodules  : no mahan  NEURO: grossly intact; motor/sensory WNL; AAOx3; no tremors  PSYCH: normal mood, affect and behavior  LYMPH: normal cervical, supraclavicular, axillary and groin LN's          Labs:     Lab Results   Component Value Date    WBC 5.60 02/17/2023    HGB 10.9 (L) 02/17/2023    HCT 32.0 (L) 02/17/2023    MCV 93 02/17/2023     02/17/2023     CMP  Sodium   Date Value Ref Range Status   02/17/2023 132 (L) 136 - 145 mmol/L Final     Potassium   Date Value Ref Range Status   02/17/2023 4.2 3.5 - 5.1 mmol/L Final     Chloride   Date Value Ref Range Status   02/17/2023 99 95 - 110 mmol/L Final     CO2   Date Value Ref Range Status   02/17/2023 26 23 - 29 mmol/L Final     Glucose   Date Value Ref Range Status   02/17/2023 155 (H) 70 - 110 mg/dL Final     BUN   Date Value Ref Range Status   02/17/2023 23 8 - 23 mg/dL Final     Creatinine   Date Value Ref Range Status   02/17/2023 1.0 0.5 - 1.4 mg/dL Final     Calcium   Date Value Ref Range Status   02/17/2023 8.5 (L) 8.7 - 10.5 mg/dL Final     Total Protein   Date Value Ref Range Status   02/17/2023 7.3 6.0 - 8.4 g/dL Final     Albumin   Date Value Ref Range Status   02/17/2023 4.0 3.5 -  5.2 g/dL Final   11/18/2020 3.7 3.6 - 5.1 g/dL Final     Comment:     For additional information, please refer to   http://education.[employer]/faq/AIY425 (This link is   being provided for informational/ educational purposes only.)  This test was developed and its analytical performance   characteristics have been determined by [facility]. It has not been cleared or approved by the   [place] Food and Drug Administration. This assay has been validated   pursuant to the CLIA regulations and is used for clinical   purposes.  @ Test Performed By:  [facility]  Yo Cortes M.D.,   [address] [place] [id number]  CLIA  [id number]       Total Bilirubin   Date Value Ref Range Status   02/17/2023 1.2 (H) 0.1 - 1.0 mg/dL Final     Comment:     For infants and newborns, interpretation of results should be based  on gestational age, weight and in agreement with clinical  observations.    Premature Infant recommended reference ranges:  Up to 24 hours.............<8.0 mg/dL  Up to 48 hours............<12.0 mg/dL  3-5 days..................<15.0 mg/dL  6-29 days.................<15.0 mg/dL       Alkaline Phosphatase   Date Value Ref Range Status   02/17/2023 355 (H) 55 - 135 U/L Final     AST   Date Value Ref Range Status   02/17/2023 53 (H) 10 - 40 U/L Final     ALT   Date Value Ref Range Status   02/17/2023 90 (H) 10 - 44 U/L Final     Anion Gap   Date Value Ref Range Status   02/17/2023 7 (L) 8 - 16 mmol/L Final     eGFR if    Date Value Ref Range Status   07/29/2022 >60.0 >60 mL/min/1.73 m^2 Final     eGFR if non    Date Value Ref Range Status   07/29/2022 >60.0 >60 mL/min/1.73 m^2 Final     Comment:     Calculation used to obtain the estimated glomerular filtration  rate (eGFR) is the CKD-EPI equation.          Lab Results   Component Value Date    IRON 30 (L) 11/25/2022    TRANSFERRIN 114 (L)  11/25/2022    TIBC 169 (L) 11/25/2022    FESATURATED 18 (L) 11/25/2022            Radiology/Diagnostic Studies:      CTA Neck    Result Date: 9/20/2022  EXAMINATION: CTA NECK CLINICAL HISTORY: Head/neck cancer, monitor;Malignant neoplasm of base of tongue TECHNIQUE: CT angiogram was performed from the level of the scott to the EAC following the IV administration of 75mL of Omnipaque 350.   Sagittal and coronal reconstructions and maximum intensity projection reconstructions were performed. Arterial stenosis percentages are based on NASCET measurement criteria. COMPARISON: CTA neck dated 05/20/2022 FINDINGS: Aortic arch and great vessels: There is a conventional left-sided 3 vessel arch.  The aortic arch is widely patent.  There is stable soft and calcified plaque in the proximal left subclavian artery with a similar appearance the prior study and possibly within radiation field but without critical stenosis or occlusion.  The right subclavian artery is patent.  The brachiocephalic trunk and origin of the left common carotid artery are patent. Carotid arteries Right carotid artery: There is calcified plaque scattered in the right common carotid artery without critical stenosis, occlusion or change.  There is no measurable stenosis of the internal carotid artery which is patent to the skull base. Left carotid artery: There is mild plaque scattered in the left common carotid artery without change and without critical stenosis or occlusion.  There is no measurable stenosis of the internal carotid artery. Vertebral arteries: The left vertebral artery is dominant and patent throughout its course in the neck.  The right vertebral artery is hypoplastic but patent.  There is no critical stenosis, occlusion, thrombus or dissection.  There is no change. The study extends intracranially.  There is calcified plaque in the V4 segment of the left vertebral artery resulting in a moderate to marked short segment nonocclusive  stenosis.  The right vertebral artery partially terminates in a posteroinferior cerebellar artery with a short segment moderate to marked stenosis of the very distal right vertebral artery.  Some flow within the distal right vertebral artery may represent retrograde flow from the dominant left vertebral artery.  The basilar artery is patent.  The origins of the superior cerebellar and posterior cerebral artery on the right are patent.  There is fetal origin of the left posterior cerebral artery which is patent. There is plaque in the cavernous segments of both internal carotid arteries but there is no critical stenosis or large vessel occlusion of the anterior circulation vessels.  There is no large aneurysm. Other: There is a similar appearance of the included upper lungs with pleural and parenchymal changes anteriorly in the upper lobes bilaterally.  As previously discussed, timing of the contrast bolus is performed during the arterial phase for CTA neck.  Therefore, enhancement of the soft tissues is somewhat suboptimal due to this technique. There has been a large region of soft tissue resection in the anterior mid and lower neck soft tissues with continued open defect in the upper chest and lower neck above the manubrium.  Soft tissue seen along the left posterolateral border of the trachea could represent secretions or mucus.  This was present previously. Redemonstrated is a large heterogeneously enhancing lesion centered at the level of the tongue base and floor of the mouth centrally into the left.  There is scattered calcifications.  The prior CTA demonstrated regions of central necrosis which are no longer definitely present but diffusely heterogeneous density and enhancement likely represents regions of necrosis.  This large heterogeneously enhancing soft tissue mass extends more anteriorly in the floor of the mouth on the left and measures at least 5.6 cm in greatest anterior to posterior dimension with  3.6 cm transverse dimension.  This does extend anteriorly to the medial margin of the body of the mandible on the left. There are post treatment changes with stranding in the neck fat.     1. Similar appearance of the neck vessels when compared to the prior CTA neck with mild narrowing at the origin of the left subclavian artery.  There is no critical stenosis, occlusion, thrombosis or dissection involving the cervical vertebral or carotid arteries. 2. Larger mass within the hypopharynx and tongue base extending anteriorly to the floor of the mouth on the left to the medial margin of the body of the mandible without obvious bony destruction. 3. There is nonocclusive stenosis of the distal V4 segment of the left vertebral artery without change. 4. There is no large vessel occlusion or critical stenosis of the anterior circulation. 5. There is developmental variation with fetal origin of the left posterior cerebral artery. Electronically signed by: Rex Joyner MD Date:    09/20/2022 Time:    11:31    CT Abdomen Pelvis With Contrast    Result Date: 9/1/2022  CMS MANDATED QUALITY DATA - CT RADIATION - 436 All CT scans at this facility utilize dose modulation, iterative reconstruction, and/or weight based dosing when appropriate to reduce radiation dose to as low as reasonably achievable. Reason: abdominal pain partial history of laryngeal carcinoma TECHNIQUE: CT abdomen and pelvis with 100 mL Omnipaque 350. COMPARISON: PET/CT 5/13/2022 CT ABDOMEN: Coronary artery calcification and extremely tiny hiatal hernia incidentally noted. Visualized lung bases are clear. Liver, gallbladder, pancreas, spleen, adrenals, and kidneys are normal. Mild aortoiliac calcifications present. Gastrostomy tube tip lies in distal gastric body. Small intestines are unremarkable. Mild wall thickening affects the ascending colon with pneumatosis intestinalis diffusely affecting the ascending colon. No other free intraperitoneal gas or, and  remainder of colon is normal. A normal appendix is present. The major mesenteric vascular structures are patent. No acute osseous abnormality. CT PELVIS: Prostate is slightly enlarged. Bladder is normal. No free pelvic fluid. Tiny right and small to moderate left fat-containing inguinal hernias are present. No acute osseous abnormality. IMPRESSION: 1. Pneumatosis intestinalis affecting the ascending colon with associated mild wall thickening. Etiology of this is undetermined. Potential considerations include intestinal ischemia or pneumatosis related to chemotherapy. Clinical and laboratory correlation is needed to assess significance. No portal venous gas or free intraperitoneal air however. 2. Coronary artery calcifications Electronically signed by:  Nael Hui MD  9/1/2022 6:20 PM CDT Workstation: [id number]    NM PET CT Routine Skull to Mid Thigh    Result Date: 9/27/2022  PET CT WITH IMAGE FUSION HISTORY:  restage tongue cancer RESTAGING...  HX: TONGUE CA.  DX: April 2022.  LAST CHEMO TREATMENT WAS 3WKS AGO.  EVALUATE TX RESPONSE.   ALSO HX OF LARYNGEAL CA IN AUGUST 2020.  MULTIPLE SURGERIES: LARYNGECTOMY, THYROIDECTOMY & TRACHEOSTOMY.  RAD TX WAS COMPLETED IN NOV 2020. TECHNIQUE: Following IV administration of 11.2 mCi of F-18 labeled FDG into right antecubital fossa and a 60 minute delay, PET CT was performed from the vertex of the skull through the proximal thighs with an integrated PET CT scanner with image fusion. CT images were obtained to aid in attenuation correction and PET localization. The patient's serum glucose at the time of the exam was 136 mg/dL. COMPARISON: PET/CT 5/13/2022 FINDINGS: Poorly characterized soft tissue mass involving base of tongue approximately measures 4.3 x 2.8 cm (series 3 image 65) appearing similar to the prior exam. This reaches current max SUV of 11.8, not significantly changed from prior of 11.4. No other abnormal FDG activity. Intracranial compartment is unremarkable.  Trace bilateral maxillary sinus mucosal thickening is present. Postoperative changes of laryngectomy and tracheostomy are present. Surgical clips occur throughout the neck. No definite enlarged cervical or supraclavicular lymph node, with evaluation limited by lack of IV contrast. Left subclavian port catheter tip terminates in SVC. Diffuse coronary artery calcifications are present. No enlarged mediastinal or axillary lymph nodes. No pulmonary nodule or mass. Gastrostomy tube tip lies in gastric body. Moderate aortoiliac calcifications are present. Small fat-containing left inguinal hernia incidentally noted. Mild degenerative changes affect the spine. No suspicious osseous abnormality. IMPRESSION: 1. No significant change in the FDG avid mass involving the tongue base, remaining characteristic of malignancy. 2. No new FDG avid malignancy or metastatic disease. Electronically signed by:  Nael Hui MD  9/27/2022 2:38 PM CDT Workstation: [id number]    CT Abdomen Pelvis  Without Contrast    Result Date: 9/4/2022  CMS MANDATED QUALITY DATA - CT RADIATION  436 All CT scans at this facility utilize dose modulation, iterative reconstruction, and/or weight based dosing when appropriate to reduce radiation dose to as low as reasonably achievable. CT ABDOMEN PELVIS WITHOUT IV CONTRAST CLINICAL HISTORY: 72 years Male Follow-up pneumatosis COMPARISON: CT abdomen and pelvis September 1, 2022 FINDINGS: Imaging through the lower thorax demonstrates subsegmental atelectasis of the dependent lower lobes. Coronary artery calcification. Bone window images show no acute or aggressive osseous abnormality. Transitional lumbosacral vertebral body. On this unenhanced exam, no focal hepatic lesion. Gallbladder and biliary tree are unremarkable. Spleen appears normal. Pancreas is unremarkable. No adrenal lesion. No renal calculi or hydronephrosis. Ureters are normal in caliber. Urinary bladder is within normal limits. Gastrostomy tube  is in place within the stomach. Stomach is largely collapsed. No evidence of small bowel obstruction. Pneumatosis and pericolonic abscess involving the ascending colon is redemonstrated, slightly diminished compared to prior. Mild wall thickening throughout the colon. No free fluid or free air within the abdomen or pelvis. Aortoiliac atherosclerotic calcification. No pathologically enlarged lymph nodes within the abdomen or pelvis. Bilateral fat-containing inguinal hernias, larger on the left. IMPRESSION: Pneumatosis and pericolonic gas about the ascending colon has decreased in volume compared to prior. Persistent colonic wall thickening which could indicate colitis. Coronary and aortic atherosclerosis. Gastrostomy tube is within the stomach. Bilateral inguinal hernias. Electronically signed by:  Jose De Jesus Oleary MD  9/4/2022 4:06 PM CDT Workstation: [id number]    CTA neck  5/20/2022:     IMPRESSION:  Persistent mass within the hypopharynx consistent with malignancy.  By my measurements it is larger than on April 24, 2022 with central necrosis.  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 This is similar to the previous April 2022 study.  No evidence of arterial compromise related to malignancy.     PET 5/13/2022:     IMPRESSION:  1. Postoperative changes of prior laryngectomy and lymph node resection/radiation.  2. Masslike FDG avid lesion to the left of midline at the tongue base concerning for residual/recurrent disease.  3. Adjacent confluent nodular extension along the inferior left side of the mass.  4. No FDG avid lymphadenopathy or convincing additional sites of disease in the chest, abdomen or pelvis.     CT head 5/10/2022:  FINDINGS: Comparison to multiple prior exams. There is no acute intracranial hemorrhage, with no mass effect or abnormal extra-axial fluid. Mild scattered areas of nonspecific hypoattenuation involve the deep periventricular  white matter, with gray-white differentiation maintained.     There is mild generalized prominence of the cortical sulci and ventricles. The cerebellum and brainstem are unremarkable. There are carotid siphon vascular calcifications. The visualized paranasal sinuses and mastoid air cells are clear. There is no acute calvarial fracture or scalp hematoma.     IMPRESSION: No acute intracranial hemorrhage or acute calvarial fracture     CT soft neck 4/24/2022;     Oral tongue/tongue base:  At the base of the tongue on the left there are findings of a heterogeneously enhancing mass measuring 2.1 cm highly concerning for a neoplastic process.     True and false cords:  Patient status post laryngectomy which has been performed in the interim. Soft tissue stranding in the lower neck likely related to a previous flat reconstruction. No enhancement or lymphadenopathy within the lower neck.     Lymph node assessment:Negative     Surrounding soft tissues:  Negative     Vasculature:  Negative     Osseous structures:  Negative     Lung apices:  Scarring involving the lung apices bilaterally likely related to previous radiation.     IMPRESSION:  1. Postoperative and radiation changes involving the lower neck.  2. Findings highly concerning for a developing mass at the left base of the tongue enhancing 2.1 cm region. Direct visualization in this region would be of benefit.        CT soft neck  12/24/2021  IMPRESSION:  1.  Laryngeal mass as on prior exam. Laryngeal airway narrowing has not changed.  2.  No evidence for active hemorrhage.  3.  Partially visualized patchy groundglass infiltrates in both upper lobes. Also see CT chest report     CTA Chest 12/24/2021:  IMPRESSION:     1.  No pulmonary embolism.     2.  Soft tissue stranding in the region of the strap musculature with ill-defined hypodensity measuring 2.8 x 1.4 cm in the cervical midline.  Findings concerning for infectious process or abscess. Neoplastic process could  have similar imaging characteristics.     3.  Suggested erosion of the proximal right parasymphyseal aspect of the thyroid shield.  Correlated with CT soft tissue neck findings. Findings could represent osteomyelitis. Neoplastic process cannot be excluded.     4.  Hepatic steatosis.  5.  Thickening of the gastric wall. Prominence of perigastric vasculature. Small hiatal hernia.     6.  Moderate stool burden.  Correlate for constipation.        PET 12/15/2021:  IMPRESSION:     Substantial qualitative and quantitative increase of hypermetabolic FDG activity associated with the larynx in this patient with known supraglottic neoplasm.     No evidence of FDG avid metastatic disease involving neck, chest, abdomen or pelvis.     PET 8/21/2020:     Impression:     1. Intensely hypermetabolic plaque-like mass along the left true vocal cord, compatible with known laryngeal carcinoma.  2. No findings of regional metastatic disease in the neck, or distant metastatic disease.        CT Chest 8/10/2020:     IMPRESSION:     No metastatic disease within the chest.  Three-vessel coronary artery calcification. Nondilated cardiac  chambers     CT Soft neck 7/22/2020:  IMPRESSION:  1. Slight interval increase in size of the previously described  enhancing nodule along the anterior left vocal cord.  2. No pathologic lymphadenopathy.  3. Additional and incidental findings as noted above.     CT Soft neck  3/16/2020:     Impression:     6 mm enhancing focus involving the superior aspect of the left focal cord near the anterior commisure.  Recommend direct visualization for further evaluation of laryngeal polyp     Question of 2 mm submucosal lipoma involving the midportion of the superior aspect of the left vocal cord.     Mild arteriosclerosis involving the brachiocephalic arteries and carotid arteries     Mild degenerative change of the cervical spine        I have reviewed all available lab results and radiology  reports.    Assessment/Plan:   (1) 72 y.o. 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     - Patient has been under the care of Dr Khoobehi with [facility] and Dr Portillo with rad/onc.   - He was recently found to have a new primary cancer involving the base of his tongue in April 2022. He was deemed not to be a candidate for any further radiation and did not want to undergo any further surgery as this would necessitate a glossectomy procedure.   - He has been on adjuvant chemotherapy per direction of Dr Khoobehi and has had 3 cycles. His therapy course has been complicated with persistent diarrhea and N/V.      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 He saw Dr Lucero with Rad/onc this am. They are going to present his case to [facility] [place] [facility] [place] and plans to see Dr James about surgical options. If he is not a surgical candidate then will proceed with cisplatin and XRT combine therapy.     10/6/2022:  - He saw Dr Lucero with Rad/onc, and Dr James and his case was presented to H&N Tumor Board at [facility] [place] and  he general consensus was to proceed to surgery.  - he is awaiting surgery date  - labs are adequate    11/3/2022:  - He has the surgery scheduled in [place] for 11/9 12/27/2022:  - He had the dissection surgery on 11/9/2022 in [place] with Dr James; - he was subsequently hospitalized from 11/23 through 12/13/2022 with concerns for development of a pharyngocutaneous  fistula, septicemia, fungemia and required IV antibiotics.   - He had his portacath removed on 11/28.   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, keratinizing type, moderate to poorly differentiated tumor  - Four LN's were all negative  - he did have a positive left superior pharyngeal margin  - pT3 pN0  - reviewed the latest NCCN guidelines again from Version 1.2023; he may need some further systemic therapy due to the margin  - check on Rad/onc follow-up     1/23/2023:  - s/p new portacath placed on 1/12/2023 with Dr Le  - starting combined chemotherapy and XRT - cisplatin  - s/p chemotherapy school with Whit    2/6/2023:  - he seems to be tolerating the chemotherapy well at this time  - continued with concomitant therapy with XRT    2/22/2023:  - he seems to be tolerating the chemotherapy well at this time  - continued with concomitant therapy with XRT  - labs relatively adequate  - will check on his refills     (2) Hx/of Laryngeal cancer in Aug 2020 stage II (cT2 N0)  - s/p radiation followed by bilateral neck dissection and total thyroidectomy     Brief Oncology Summary for his laryngeal CA hx:     Patient was noted to initially have a suspicious lesion on the left true vocal cord by laryngoscopy with Dr Xiao in March 2020 with biopsy showing severe dysplastic changes without definitive invasive process. He had a CT scan on 3/16/2020 which showed a 6mm nodule on the left vocal cord. A repeat Ct scan four months later on 7/22/2020 showed the nodule enlarged to 1.4 x 1.1 cm in size. Patient was then seen by Dr Noel and underwent repeat scope in Aug 2020 who found a bulky deeply invading tumor which was debulked and the pathology coming back moderately differentiated SCCA without lymphovascular or perineural invasion. Hs case was subsequently presented to the tumor  board and the consensus was to proceed with radiation. He completed XRT on 10/30/2020 and proceeded with regular laryngoscopy surveillance. He eventually required salvage laryngectomy and neck dissection with flap with Dr James on Jan 6th 2022. 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 Fifty lymph nodes were removed which were all negative. Pathology TNM was pT4a, pN0. Patient did not require any adjuvant therapy afterwards.      (2) HTN and hypercholesterolemia     (3) Paroxysmal atrial fibrillation, V tach     (4) DM II     (5) B12 deficiency      (6) Fatty liver disease, GERD           VISIT DIAGNOSES:      Vitamin B12 deficiency    Iron deficiency anemia due to chronic blood loss    Laryngeal cancer    Larynx cancer    Malignant neoplasm of base of tongue    Chemotherapy-induced neutropenia          PLAN:  Continued with systemic concomitant therapy with Cisplat and XRT    s/p chemotherapy school with Briana - discussed the side-effect profile, provided literature and obtained consents  F/u Rad/onc follow-up as directed  F/u with Dr James with ENT and Dr Lucero with rad/onc  Check labs weekly  F/u with PCP, ENT, etc  Dietician f/u on the tube feeds     RTC in 1 week with Whit and 2 weeks with myself  Fax note to  Bandar/Dameon Lucero Hasney, Yesenia Gomez       Discussion:     COVID-19 Discussion:     I had long discussion with patient and any applicable family about the COVID-19 coronavirus epidemic and the recommended precautions with regard to cancer and/or hematology patients. I have re-iterated the CDC recommendations for adequate hand washing, use of hand -like products, and coughing into elbow, etc. In addition, especially for our patients who are on chemotherapy and/or our otherwise immunocompromised patients, I have recommended avoidance of crowds,  "including movie theaters, restaurants, churches, etc. I have recommended avoidance of any sick or symptomatic family members and/or friends. I have also recommended avoidance of any raw and unwashed food products, and general avoidance of food items that have not been prepared by themselves. The patient has been asked to call us immediately with any symptom developments, issues, questions or other general concerns.         Pathology Discussion:     I reviewed and discussed the pathology report(s) and radiograph reports (if available) in as simple to understand and/or laymen's terms to the best of my ability. I had an indepth conversation with the patient and went over the patient's individual diagnosis based on the information that was currently available. I discussed the TNM staging process with regard to the patient's particular cancer type, and the calculated stage based on the currently available TNM data and literature. I discussed the available prognostic data with regard to the current staging information and how it relates to the prognosis of their particular neoplastic process.          NCCN Guidelines:     I discussed the available treatment option(s) in accordance with the latest literature from the NCCN Clinical Practice Guidelines for the patient's particular type of cancer disorder. The NCCN Guidelines provide a "document evidence-based (and) consensus-driven management" of the care of oncology patients. The treatment recommendations were made not only in accordance to the NCCN guidelines, but also factored in to account the patient's overall age, condition, performance status and their medical co-morbidities. I went over the risks and benefits of the the treatment options (if any could be made) with regard to their particular cancer type, their cancer stage, their age, and their co-morbidities.         Chemotherapy Discussion:      I discussed the available treatment option(s) in accordance with the " latest/current national evidence-based guidelines (NCCN, UpToDate, NCI, ASCO, etc where applicable), their overall age/condition and their co-morbidities. I also went over the risks and benefits of the chemotherapy with regard to their particular cancer type, their cancer stage, their age/condition, and their co-morbidities. I provided literature on the chemotherapy regimen and discussed the chemotherapy side-effect profiles of the drug(s). I discussed the importance of compliance with obtaining and monitoring weekly lab work, and went over the potential hematopathology issues and risks with anemia, leucopenia and thrombocytopenia that can occur with chemotherapy. I discussed the potential risks of liver and kidney damage, which could be permanent and could necessitate dialysis long-term if kidney failure developed. I discussed the emetic and/or diarrheal potential of the regimen and the potential need for use of antiemetic and anti-diarrheal medications. I discussed the risk for development of anaphylactic shock, bronchospasm, dysrhythmia, and respiratory/cardiovascular arrest and/or failure. I discussed the potential risks for development of alopecia, cold sensory issues, ringing in ears, vertigo, cataracts, glaucoma, and neuropathy, all of which could end up being chronic and life-long. Some chemotherpyI discussed the risks of hand-foot syndrome and rashes, and development of other autoimmune mediated processes such as pneumonitis, hepatitis, and colitis which could be life threatening. I discussed the risks of the potential development of a rare but fatal viral mediated disease known as PML (Progressive Multifocal Leukoencephalopathy), and risk of future development of leukemia and/or lymphoma from use of certain chemotherapy agents. I discussed the need for neutropenic precautions, basic hygiene/sanitation behaviors and dietary restrictions.    The patient's consent has been obtained to proceed with the  chemotherapy.The patient will be referred to Chemotherapy School [facility] for training and education on chemotherapy, use of antiemetics and/or anti-diarrheals, use of NSAID's, potential chemotherapy side-effects, and any specific recommendations and precautions with the particular chemotherapy agents.      I answered all of the patient's (and family's, if applicable) questions to the best of my ability and to their complete satisfaction. The patient acknowledged full understanding of the risks, recommendations and plan(s).     I have explained and the patient understands all of  the current recommendation(s). I have answered all of their questions to the best of my ability and to their complete satisfaction.         I spent over 25 mins of time with the patient. Reviewed results of the recently ordered labs, tests and studies; made directives with regards to the results. Over half of this time was spent couseling and coordinating care.    I have explained all of the above in detail and the patient understands all of the current recommendation(s). I have answered all of their questions to the best of my ability and to their complete satisfaction.   The patient is to continue with the current management plan.            Electronically signed by Venu Tamez MD                    Answers submitted by the patient for this visit:  Review of Systems Questionnaire (Submitted on 2/20/2023)  appetite change : No  unexpected weight change: No  mouth sores: No  visual disturbance: No  cough: No  shortness of breath: Yes  chest pain: No  abdominal pain: No  diarrhea: No  frequency: No  back pain: No  rash: No  headaches: No  adenopathy: No  nervous/ anxious: No

## 2023-02-17 ENCOUNTER — LAB VISIT (OUTPATIENT)
Dept: LAB | Facility: HOSPITAL | Age: 73
End: 2023-02-17
Attending: NURSE PRACTITIONER
Payer: MEDICARE

## 2023-02-17 DIAGNOSIS — C01 MALIGNANT NEOPLASM OF BASE OF TONGUE: ICD-10-CM

## 2023-02-17 LAB
ALBUMIN SERPL BCP-MCNC: 4 G/DL (ref 3.5–5.2)
ALP SERPL-CCNC: 355 U/L (ref 55–135)
ALT SERPL W/O P-5'-P-CCNC: 90 U/L (ref 10–44)
ANION GAP SERPL CALC-SCNC: 7 MMOL/L (ref 8–16)
AST SERPL-CCNC: 53 U/L (ref 10–40)
BASOPHILS # BLD AUTO: 0.01 K/UL (ref 0–0.2)
BASOPHILS NFR BLD: 0.2 % (ref 0–1.9)
BILIRUB SERPL-MCNC: 1.2 MG/DL (ref 0.1–1)
BUN SERPL-MCNC: 23 MG/DL (ref 8–23)
CALCIUM SERPL-MCNC: 8.5 MG/DL (ref 8.7–10.5)
CHLORIDE SERPL-SCNC: 99 MMOL/L (ref 95–110)
CO2 SERPL-SCNC: 26 MMOL/L (ref 23–29)
CREAT SERPL-MCNC: 1 MG/DL (ref 0.5–1.4)
DIFFERENTIAL METHOD: ABNORMAL
EOSINOPHIL # BLD AUTO: 0 K/UL (ref 0–0.5)
EOSINOPHIL NFR BLD: 0.4 % (ref 0–8)
ERYTHROCYTE [DISTWIDTH] IN BLOOD BY AUTOMATED COUNT: 13.9 % (ref 11.5–14.5)
EST. GFR  (NO RACE VARIABLE): >60 ML/MIN/1.73 M^2
GLUCOSE SERPL-MCNC: 155 MG/DL (ref 70–110)
HCT VFR BLD AUTO: 32 % (ref 40–54)
HGB BLD-MCNC: 10.9 G/DL (ref 14–18)
IMM GRANULOCYTES # BLD AUTO: 0.03 K/UL (ref 0–0.04)
IMM GRANULOCYTES NFR BLD AUTO: 0.5 % (ref 0–0.5)
LYMPHOCYTES # BLD AUTO: 0.7 K/UL (ref 1–4.8)
LYMPHOCYTES NFR BLD: 12.9 % (ref 18–48)
MCH RBC QN AUTO: 31.8 PG (ref 27–31)
MCHC RBC AUTO-ENTMCNC: 34.1 G/DL (ref 32–36)
MCV RBC AUTO: 93 FL (ref 82–98)
MONOCYTES # BLD AUTO: 0.3 K/UL (ref 0.3–1)
MONOCYTES NFR BLD: 5 % (ref 4–15)
NEUTROPHILS # BLD AUTO: 4.5 K/UL (ref 1.8–7.7)
NEUTROPHILS NFR BLD: 81 % (ref 38–73)
NRBC BLD-RTO: 0 /100 WBC
PLATELET # BLD AUTO: 176 K/UL (ref 150–450)
PMV BLD AUTO: 10.4 FL (ref 9.2–12.9)
POTASSIUM SERPL-SCNC: 4.2 MMOL/L (ref 3.5–5.1)
PROT SERPL-MCNC: 7.3 G/DL (ref 6–8.4)
RBC # BLD AUTO: 3.43 M/UL (ref 4.6–6.2)
SODIUM SERPL-SCNC: 132 MMOL/L (ref 136–145)
WBC # BLD AUTO: 5.6 K/UL (ref 3.9–12.7)

## 2023-02-17 PROCEDURE — 80053 COMPREHEN METABOLIC PANEL: CPT | Performed by: NURSE PRACTITIONER

## 2023-02-17 PROCEDURE — 85025 COMPLETE CBC W/AUTO DIFF WBC: CPT | Performed by: NURSE PRACTITIONER

## 2023-02-17 PROCEDURE — 36415 COLL VENOUS BLD VENIPUNCTURE: CPT | Performed by: NURSE PRACTITIONER

## 2023-02-20 ENCOUNTER — INFUSION (OUTPATIENT)
Dept: INFUSION THERAPY | Facility: HOSPITAL | Age: 73
End: 2023-02-20
Attending: INTERNAL MEDICINE
Payer: MEDICARE

## 2023-02-20 VITALS
SYSTOLIC BLOOD PRESSURE: 123 MMHG | HEIGHT: 66 IN | TEMPERATURE: 97 F | OXYGEN SATURATION: 100 % | BODY MASS INDEX: 20.67 KG/M2 | WEIGHT: 128.63 LBS | RESPIRATION RATE: 18 BRPM | HEART RATE: 79 BPM | DIASTOLIC BLOOD PRESSURE: 74 MMHG

## 2023-02-20 DIAGNOSIS — C01 MALIGNANT NEOPLASM OF BASE OF TONGUE: Primary | ICD-10-CM

## 2023-02-20 DIAGNOSIS — C32.9 LARYNX CANCER: ICD-10-CM

## 2023-02-20 PROCEDURE — 96375 TX/PRO/DX INJ NEW DRUG ADDON: CPT

## 2023-02-20 PROCEDURE — 96361 HYDRATE IV INFUSION ADD-ON: CPT

## 2023-02-20 PROCEDURE — 96413 CHEMO IV INFUSION 1 HR: CPT

## 2023-02-20 PROCEDURE — A4216 STERILE WATER/SALINE, 10 ML: HCPCS | Performed by: INTERNAL MEDICINE

## 2023-02-20 PROCEDURE — 96367 TX/PROPH/DG ADDL SEQ IV INF: CPT

## 2023-02-20 PROCEDURE — 96366 THER/PROPH/DIAG IV INF ADDON: CPT

## 2023-02-20 PROCEDURE — 25000003 PHARM REV CODE 250: Performed by: INTERNAL MEDICINE

## 2023-02-20 PROCEDURE — 63600175 PHARM REV CODE 636 W HCPCS: Performed by: INTERNAL MEDICINE

## 2023-02-20 RX ORDER — HEPARIN 100 UNIT/ML
500 SYRINGE INTRAVENOUS
Status: DISCONTINUED | OUTPATIENT
Start: 2023-02-20 | End: 2023-02-20 | Stop reason: HOSPADM

## 2023-02-20 RX ORDER — SODIUM CHLORIDE 0.9 % (FLUSH) 0.9 %
10 SYRINGE (ML) INJECTION
Status: DISCONTINUED | OUTPATIENT
Start: 2023-02-20 | End: 2023-02-20 | Stop reason: HOSPADM

## 2023-02-20 RX ADMIN — SODIUM CHLORIDE, PRESERVATIVE FREE 10 ML: 5 INJECTION INTRAVENOUS at 12:02

## 2023-02-20 RX ADMIN — CISPLATIN 66 MG: 1 INJECTION, SOLUTION INTRAVENOUS at 10:02

## 2023-02-20 RX ADMIN — HEPARIN 500 UNITS: 100 SYRINGE at 12:02

## 2023-02-20 RX ADMIN — SODIUM CHLORIDE 1000 ML: 0.9 INJECTION, SOLUTION INTRAVENOUS at 10:02

## 2023-02-20 RX ADMIN — PALONOSETRON HYDROCHLORIDE 0.25 MG: 0.25 INJECTION INTRAVENOUS at 09:02

## 2023-02-20 RX ADMIN — APREPITANT 130 MG: 130 INJECTION, EMULSION INTRAVENOUS at 09:02

## 2023-02-20 RX ADMIN — POTASSIUM CHLORIDE 150 ML/HR: 2 INJECTION, SOLUTION, CONCENTRATE INTRAVENOUS at 07:02

## 2023-02-20 NOTE — PLAN OF CARE
Problem: Fatigue  Goal: Improved Activity Tolerance  Outcome: Ongoing, Progressing  Intervention: Promote Improved Energy  Flowsheets (Taken 2/20/2023 3124)  Fatigue Management:   fatigue-related activity identified   frequent rest breaks encouraged   paced activity encouraged  Sleep/Rest Enhancement:   regular sleep/rest pattern promoted   relaxation techniques promoted  Activity Management: Ambulated -L4

## 2023-02-22 ENCOUNTER — OFFICE VISIT (OUTPATIENT)
Dept: HEMATOLOGY/ONCOLOGY | Facility: CLINIC | Age: 73
End: 2023-02-22
Payer: MEDICARE

## 2023-02-22 VITALS
SYSTOLIC BLOOD PRESSURE: 97 MMHG | BODY MASS INDEX: 20.57 KG/M2 | TEMPERATURE: 98 F | DIASTOLIC BLOOD PRESSURE: 63 MMHG | OXYGEN SATURATION: 98 % | RESPIRATION RATE: 18 BRPM | HEART RATE: 73 BPM | HEIGHT: 66 IN | WEIGHT: 128 LBS

## 2023-02-22 DIAGNOSIS — D70.1 CHEMOTHERAPY-INDUCED NEUTROPENIA: ICD-10-CM

## 2023-02-22 DIAGNOSIS — C32.9 LARYNX CANCER: ICD-10-CM

## 2023-02-22 DIAGNOSIS — E53.8 VITAMIN B12 DEFICIENCY: Primary | ICD-10-CM

## 2023-02-22 DIAGNOSIS — D50.0 IRON DEFICIENCY ANEMIA DUE TO CHRONIC BLOOD LOSS: ICD-10-CM

## 2023-02-22 DIAGNOSIS — T45.1X5A CHEMOTHERAPY-INDUCED NEUTROPENIA: ICD-10-CM

## 2023-02-22 DIAGNOSIS — C32.9 LARYNGEAL CANCER: ICD-10-CM

## 2023-02-22 DIAGNOSIS — C01 MALIGNANT NEOPLASM OF BASE OF TONGUE: ICD-10-CM

## 2023-02-22 PROCEDURE — 3008F PR BODY MASS INDEX (BMI) DOCUMENTED: ICD-10-PCS | Mod: CPTII,S$GLB,, | Performed by: INTERNAL MEDICINE

## 2023-02-22 PROCEDURE — 3288F PR FALLS RISK ASSESSMENT DOCUMENTED: ICD-10-PCS | Mod: CPTII,S$GLB,, | Performed by: INTERNAL MEDICINE

## 2023-02-22 PROCEDURE — 3078F PR MOST RECENT DIASTOLIC BLOOD PRESSURE < 80 MM HG: ICD-10-PCS | Mod: CPTII,S$GLB,, | Performed by: INTERNAL MEDICINE

## 2023-02-22 PROCEDURE — 99214 PR OFFICE/OUTPT VISIT, EST, LEVL IV, 30-39 MIN: ICD-10-PCS | Mod: S$GLB,,, | Performed by: INTERNAL MEDICINE

## 2023-02-22 PROCEDURE — 1160F PR REVIEW ALL MEDS BY PRESCRIBER/CLIN PHARMACIST DOCUMENTED: ICD-10-PCS | Mod: CPTII,S$GLB,, | Performed by: INTERNAL MEDICINE

## 2023-02-22 PROCEDURE — 1101F PR PT FALLS ASSESS DOC 0-1 FALLS W/OUT INJ PAST YR: ICD-10-PCS | Mod: CPTII,S$GLB,, | Performed by: INTERNAL MEDICINE

## 2023-02-22 PROCEDURE — 3074F PR MOST RECENT SYSTOLIC BLOOD PRESSURE < 130 MM HG: ICD-10-PCS | Mod: CPTII,S$GLB,, | Performed by: INTERNAL MEDICINE

## 2023-02-22 PROCEDURE — 3288F FALL RISK ASSESSMENT DOCD: CPT | Mod: CPTII,S$GLB,, | Performed by: INTERNAL MEDICINE

## 2023-02-22 PROCEDURE — 1126F AMNT PAIN NOTED NONE PRSNT: CPT | Mod: CPTII,S$GLB,, | Performed by: INTERNAL MEDICINE

## 2023-02-22 PROCEDURE — 99214 OFFICE O/P EST MOD 30 MIN: CPT | Mod: S$GLB,,, | Performed by: INTERNAL MEDICINE

## 2023-02-22 PROCEDURE — 3078F DIAST BP <80 MM HG: CPT | Mod: CPTII,S$GLB,, | Performed by: INTERNAL MEDICINE

## 2023-02-22 PROCEDURE — 1126F PR PAIN SEVERITY QUANTIFIED, NO PAIN PRESENT: ICD-10-PCS | Mod: CPTII,S$GLB,, | Performed by: INTERNAL MEDICINE

## 2023-02-22 PROCEDURE — 3008F BODY MASS INDEX DOCD: CPT | Mod: CPTII,S$GLB,, | Performed by: INTERNAL MEDICINE

## 2023-02-22 PROCEDURE — 1159F PR MEDICATION LIST DOCUMENTED IN MEDICAL RECORD: ICD-10-PCS | Mod: CPTII,S$GLB,, | Performed by: INTERNAL MEDICINE

## 2023-02-22 PROCEDURE — 1160F RVW MEDS BY RX/DR IN RCRD: CPT | Mod: CPTII,S$GLB,, | Performed by: INTERNAL MEDICINE

## 2023-02-22 PROCEDURE — 1101F PT FALLS ASSESS-DOCD LE1/YR: CPT | Mod: CPTII,S$GLB,, | Performed by: INTERNAL MEDICINE

## 2023-02-22 PROCEDURE — 3074F SYST BP LT 130 MM HG: CPT | Mod: CPTII,S$GLB,, | Performed by: INTERNAL MEDICINE

## 2023-02-22 PROCEDURE — 1159F MED LIST DOCD IN RCRD: CPT | Mod: CPTII,S$GLB,, | Performed by: INTERNAL MEDICINE

## 2023-02-22 RX ORDER — LEVOFLOXACIN 750 MG/1
750 TABLET ORAL
COMMUNITY
Start: 2023-01-13 | End: 2023-03-07

## 2023-02-24 ENCOUNTER — LAB VISIT (OUTPATIENT)
Dept: LAB | Facility: HOSPITAL | Age: 73
End: 2023-02-24
Attending: NURSE PRACTITIONER
Payer: MEDICARE

## 2023-02-24 ENCOUNTER — EXTERNAL HOME HEALTH (OUTPATIENT)
Dept: HOME HEALTH SERVICES | Facility: HOSPITAL | Age: 73
End: 2023-02-24
Payer: MEDICARE

## 2023-02-24 DIAGNOSIS — C01 MALIGNANT NEOPLASM OF BASE OF TONGUE: ICD-10-CM

## 2023-02-24 LAB
ALBUMIN SERPL BCP-MCNC: 4.2 G/DL (ref 3.5–5.2)
ALP SERPL-CCNC: 303 U/L (ref 55–135)
ALT SERPL W/O P-5'-P-CCNC: 71 U/L (ref 10–44)
ANION GAP SERPL CALC-SCNC: 8 MMOL/L (ref 8–16)
AST SERPL-CCNC: 40 U/L (ref 10–40)
BASOPHILS # BLD AUTO: 0.01 K/UL (ref 0–0.2)
BASOPHILS NFR BLD: 0.3 % (ref 0–1.9)
BILIRUB SERPL-MCNC: 0.8 MG/DL (ref 0.1–1)
BUN SERPL-MCNC: 22 MG/DL (ref 8–23)
CALCIUM SERPL-MCNC: 8.6 MG/DL (ref 8.7–10.5)
CHLORIDE SERPL-SCNC: 102 MMOL/L (ref 95–110)
CO2 SERPL-SCNC: 27 MMOL/L (ref 23–29)
CREAT SERPL-MCNC: 0.8 MG/DL (ref 0.5–1.4)
DIFFERENTIAL METHOD: ABNORMAL
EOSINOPHIL # BLD AUTO: 0 K/UL (ref 0–0.5)
EOSINOPHIL NFR BLD: 0.3 % (ref 0–8)
ERYTHROCYTE [DISTWIDTH] IN BLOOD BY AUTOMATED COUNT: 13.9 % (ref 11.5–14.5)
EST. GFR  (NO RACE VARIABLE): >60 ML/MIN/1.73 M^2
GLUCOSE SERPL-MCNC: 101 MG/DL (ref 70–110)
HCT VFR BLD AUTO: 29.4 % (ref 40–54)
HGB BLD-MCNC: 10 G/DL (ref 14–18)
IMM GRANULOCYTES # BLD AUTO: 0.01 K/UL (ref 0–0.04)
IMM GRANULOCYTES NFR BLD AUTO: 0.3 % (ref 0–0.5)
LYMPHOCYTES # BLD AUTO: 0.6 K/UL (ref 1–4.8)
LYMPHOCYTES NFR BLD: 18 % (ref 18–48)
MCH RBC QN AUTO: 31.7 PG (ref 27–31)
MCHC RBC AUTO-ENTMCNC: 34 G/DL (ref 32–36)
MCV RBC AUTO: 93 FL (ref 82–98)
MONOCYTES # BLD AUTO: 0.2 K/UL (ref 0.3–1)
MONOCYTES NFR BLD: 6.4 % (ref 4–15)
NEUTROPHILS # BLD AUTO: 2.6 K/UL (ref 1.8–7.7)
NEUTROPHILS NFR BLD: 74.7 % (ref 38–73)
NRBC BLD-RTO: 0 /100 WBC
PLATELET # BLD AUTO: 168 K/UL (ref 150–450)
PMV BLD AUTO: 10.4 FL (ref 9.2–12.9)
POTASSIUM SERPL-SCNC: 4.1 MMOL/L (ref 3.5–5.1)
PROT SERPL-MCNC: 7 G/DL (ref 6–8.4)
RBC # BLD AUTO: 3.15 M/UL (ref 4.6–6.2)
SODIUM SERPL-SCNC: 137 MMOL/L (ref 136–145)
WBC # BLD AUTO: 3.44 K/UL (ref 3.9–12.7)

## 2023-02-24 PROCEDURE — 85025 COMPLETE CBC W/AUTO DIFF WBC: CPT | Performed by: NURSE PRACTITIONER

## 2023-02-24 PROCEDURE — 80053 COMPREHEN METABOLIC PANEL: CPT | Performed by: NURSE PRACTITIONER

## 2023-02-24 PROCEDURE — 36415 COLL VENOUS BLD VENIPUNCTURE: CPT | Performed by: NURSE PRACTITIONER

## 2023-02-24 RX ORDER — HEPARIN 100 UNIT/ML
500 SYRINGE INTRAVENOUS
Status: CANCELLED | OUTPATIENT
Start: 2023-02-27

## 2023-02-24 RX ORDER — SODIUM CHLORIDE 0.9 % (FLUSH) 0.9 %
10 SYRINGE (ML) INJECTION
Status: CANCELLED | OUTPATIENT
Start: 2023-02-27

## 2023-02-27 ENCOUNTER — PATIENT MESSAGE (OUTPATIENT)
Dept: HEMATOLOGY/ONCOLOGY | Facility: CLINIC | Age: 73
End: 2023-02-27

## 2023-02-27 ENCOUNTER — DOCUMENTATION ONLY (OUTPATIENT)
Dept: HEMATOLOGY/ONCOLOGY | Facility: CLINIC | Age: 73
End: 2023-02-27

## 2023-02-27 ENCOUNTER — INFUSION (OUTPATIENT)
Dept: INFUSION THERAPY | Facility: HOSPITAL | Age: 73
End: 2023-02-27
Attending: INTERNAL MEDICINE
Payer: MEDICARE

## 2023-02-27 ENCOUNTER — OFFICE VISIT (OUTPATIENT)
Dept: HEMATOLOGY/ONCOLOGY | Facility: CLINIC | Age: 73
End: 2023-02-27
Payer: MEDICARE

## 2023-02-27 VITALS
OXYGEN SATURATION: 100 % | DIASTOLIC BLOOD PRESSURE: 79 MMHG | WEIGHT: 126.69 LBS | SYSTOLIC BLOOD PRESSURE: 127 MMHG | BODY MASS INDEX: 20.36 KG/M2 | TEMPERATURE: 98 F | HEIGHT: 66 IN | RESPIRATION RATE: 15 BRPM | HEART RATE: 74 BPM

## 2023-02-27 VITALS
TEMPERATURE: 97 F | SYSTOLIC BLOOD PRESSURE: 108 MMHG | HEART RATE: 82 BPM | DIASTOLIC BLOOD PRESSURE: 71 MMHG | RESPIRATION RATE: 17 BRPM | BODY MASS INDEX: 20.36 KG/M2 | WEIGHT: 126.69 LBS | HEIGHT: 66 IN | OXYGEN SATURATION: 100 %

## 2023-02-27 DIAGNOSIS — R11.0 CHEMOTHERAPY-INDUCED NAUSEA: ICD-10-CM

## 2023-02-27 DIAGNOSIS — T45.1X5A CHEMOTHERAPY-INDUCED NAUSEA: ICD-10-CM

## 2023-02-27 DIAGNOSIS — E83.51 HYPOCALCEMIA: ICD-10-CM

## 2023-02-27 DIAGNOSIS — C01 MALIGNANT NEOPLASM OF BASE OF TONGUE: Primary | ICD-10-CM

## 2023-02-27 DIAGNOSIS — C32.9 LARYNX CANCER: ICD-10-CM

## 2023-02-27 DIAGNOSIS — C32.9 LARYNGEAL CANCER: Primary | ICD-10-CM

## 2023-02-27 PROCEDURE — 96361 HYDRATE IV INFUSION ADD-ON: CPT

## 2023-02-27 PROCEDURE — 3008F PR BODY MASS INDEX (BMI) DOCUMENTED: ICD-10-PCS | Mod: CPTII,S$GLB,, | Performed by: NURSE PRACTITIONER

## 2023-02-27 PROCEDURE — 3008F BODY MASS INDEX DOCD: CPT | Mod: CPTII,S$GLB,, | Performed by: NURSE PRACTITIONER

## 2023-02-27 PROCEDURE — 63600175 PHARM REV CODE 636 W HCPCS: Mod: JG | Performed by: INTERNAL MEDICINE

## 2023-02-27 PROCEDURE — A4216 STERILE WATER/SALINE, 10 ML: HCPCS | Performed by: INTERNAL MEDICINE

## 2023-02-27 PROCEDURE — 96366 THER/PROPH/DIAG IV INF ADDON: CPT

## 2023-02-27 PROCEDURE — 3074F SYST BP LT 130 MM HG: CPT | Mod: CPTII,S$GLB,, | Performed by: NURSE PRACTITIONER

## 2023-02-27 PROCEDURE — 96367 TX/PROPH/DG ADDL SEQ IV INF: CPT

## 2023-02-27 PROCEDURE — 3074F PR MOST RECENT SYSTOLIC BLOOD PRESSURE < 130 MM HG: ICD-10-PCS | Mod: CPTII,S$GLB,, | Performed by: NURSE PRACTITIONER

## 2023-02-27 PROCEDURE — 96375 TX/PRO/DX INJ NEW DRUG ADDON: CPT

## 2023-02-27 PROCEDURE — 25000003 PHARM REV CODE 250: Performed by: INTERNAL MEDICINE

## 2023-02-27 PROCEDURE — 3078F DIAST BP <80 MM HG: CPT | Mod: CPTII,S$GLB,, | Performed by: NURSE PRACTITIONER

## 2023-02-27 PROCEDURE — 99214 PR OFFICE/OUTPT VISIT, EST, LEVL IV, 30-39 MIN: ICD-10-PCS | Mod: S$GLB,,, | Performed by: NURSE PRACTITIONER

## 2023-02-27 PROCEDURE — 96413 CHEMO IV INFUSION 1 HR: CPT

## 2023-02-27 PROCEDURE — 3078F PR MOST RECENT DIASTOLIC BLOOD PRESSURE < 80 MM HG: ICD-10-PCS | Mod: CPTII,S$GLB,, | Performed by: NURSE PRACTITIONER

## 2023-02-27 PROCEDURE — 1126F PR PAIN SEVERITY QUANTIFIED, NO PAIN PRESENT: ICD-10-PCS | Mod: CPTII,S$GLB,, | Performed by: NURSE PRACTITIONER

## 2023-02-27 PROCEDURE — 99214 OFFICE O/P EST MOD 30 MIN: CPT | Mod: S$GLB,,, | Performed by: NURSE PRACTITIONER

## 2023-02-27 PROCEDURE — 1126F AMNT PAIN NOTED NONE PRSNT: CPT | Mod: CPTII,S$GLB,, | Performed by: NURSE PRACTITIONER

## 2023-02-27 RX ORDER — PROMETHAZINE HYDROCHLORIDE 25 MG/1
25 TABLET ORAL
Qty: 30 TABLET | Refills: 2 | Status: SHIPPED | OUTPATIENT
Start: 2023-02-27 | End: 2023-02-28 | Stop reason: SDUPTHER

## 2023-02-27 RX ORDER — HEPARIN 100 UNIT/ML
500 SYRINGE INTRAVENOUS
Status: DISCONTINUED | OUTPATIENT
Start: 2023-02-27 | End: 2023-02-27 | Stop reason: HOSPADM

## 2023-02-27 RX ORDER — SODIUM CHLORIDE 0.9 % (FLUSH) 0.9 %
10 SYRINGE (ML) INJECTION
Status: DISCONTINUED | OUTPATIENT
Start: 2023-02-27 | End: 2023-02-27 | Stop reason: HOSPADM

## 2023-02-27 RX ADMIN — PALONOSETRON HYDROCHLORIDE 0.25 MG: 0.25 INJECTION INTRAVENOUS at 09:02

## 2023-02-27 RX ADMIN — APREPITANT 130 MG: 130 INJECTION, EMULSION INTRAVENOUS at 09:02

## 2023-02-27 RX ADMIN — HEPARIN 500 UNITS: 100 SYRINGE at 12:02

## 2023-02-27 RX ADMIN — CISPLATIN 66 MG: 1 INJECTION INTRAVENOUS at 10:02

## 2023-02-27 RX ADMIN — POTASSIUM CHLORIDE 150 ML/HR: 2 INJECTION, SOLUTION, CONCENTRATE INTRAVENOUS at 07:02

## 2023-02-27 RX ADMIN — SODIUM CHLORIDE 1000 ML: 0.9 INJECTION, SOLUTION INTRAVENOUS at 10:02

## 2023-02-27 RX ADMIN — SODIUM CHLORIDE, PRESERVATIVE FREE 10 ML: 5 INJECTION INTRAVENOUS at 12:02

## 2023-02-27 NOTE — PROGRESS NOTES
[facility]ematology/Oncology  PROGRESS NOTE -  Follow-up Visit      Subjective:       Patient ID:   NAME: Cyrus Batres Jr. : 1950     72 y.o. 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  The patient is here today to go over the results of the recently ordered labs, tests and studies. He is here by himself.    He is getting chemotherapy again today and continues with the XRT. He has healed up well from his prior surgery.  He seems to be tolerating the regimen fairly well with no new complaints. 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 He had his portacath removed on  and replaced on 2023 by Dr Le    He is breathing ok. No HA's or CP; He is having increased nausea, however he is only taking his zofran. He is also doing his liquid calcium 4 times a day.         Discussed covid19 and he has been vaccinated      ROS:   GEN: normal without any fever, night sweats or weight loss; doing well with tube feeds   HEENT: normal with no HA's, sore throat, changes in vision + trach and skin changes to surrounding area post surgery   CV: normal with no CP, SOB, PND, GRIFFIN or orthopnea  PULM: normal with no SOB, cough, hemoptysis, sputum or pleuritic pain  GI: +nausea ; has peg tube;    : normal with no hematuria, dysuria  BREAST:  "normal with no mass, discharge, pain  SKIN: normal with no rash, erythema, bruising, or swelling     Pain Scale:  0    Allergies:  Review of patient's allergies indicates:   Allergen Reactions    Lovastatin Itching    Pollen extracts     Lovastatin Rash     Not confirmed but pt skeptical       Medications:    Current Outpatient Medications:     acetaminophen (TYLENOL) 325 MG tablet, Take 325 mg by mouth every 6 (six) hours as needed for Pain., Disp: , Rfl:     BD ULTRA-FINE YULISSA PEN NEEDLE 32 gauge x 5/32" Ndle, To be used with Novolog pen up to 4x a day, Disp: 100 each, Rfl: 3    calcitrioL (ROCALTROL) 1 mcg/mL solution, Take 0.25 mLs (0.25 mcg total) by Per G Tube route once daily., Disp: 15 mL, Rfl: 12    calcium carbonate (TUMS) 200 mg calcium (500 mg) chewable tablet, 2 tablets (1,000 mg total) by Per G Tube route 5 (five) times daily., Disp: 300 tablet, Rfl: 11    calcium carbonate 500 mg/5 mL (1,250 mg/5 mL), 10 mLs (1,000 mg total) by Per G Tube route 4 (four) times daily with meals and nightly., Disp: 473 mL, Rfl: 12    calcium carbonate 500 mg/5 mL (1,250 mg/5 mL), 10 mLs (1,000 mg total) by Per G Tube route 4 (four) times daily with meals and nightly., Disp: 1200 mL, Rfl: 12    esomeprazole (NEXIUM) 40 MG capsule, 40 mg before breakfast., Disp: , Rfl:     famotidine (PEPCID) 40 mg/5 mL (8 mg/mL) suspension, 2.5 mLs (20 mg total) by Per G Tube route 2 (two) times daily., Disp: 50 mL, Rfl: 11    ibuprofen (ADVIL,MOTRIN) 200 MG tablet, Take 200 mg by mouth every 6 (six) hours as needed for Pain., Disp: , Rfl:     insulin aspart U-100 (NOVOLOG FLEXPEN U-100 INSULIN) 100 unit/mL (3 mL) InPn pen, Inject 0-10 Units into the skin as needed; Add correction scale if needed while on TF.  Blood sugar 180-230 add 2 units, 231-280 +4 units, 281-330 +6 units, 331-380 +8 units, >380 +10 units.  MDD 40 units, Disp: 12 mL, Rfl: 0    lactose-reduced food with fibr (JEVITY 1.5 TRISHA) 0.06 gram-1.5 kcal/mL Liqd, 6 cartons per " day via peg.  Flush 60ml before and after each bolus, Disp: 180 each, Rfl: 11    levoFLOXacin (LEVAQUIN) 750 MG tablet, Take 750 mg by mouth. for seven days, Disp: , Rfl:     levothyroxine (SYNTHROID) 100 MCG tablet, 1 tablet (100 mcg total) by Per G Tube route before breakfast., Disp: 30 tablet, Rfl: 11    losartan (COZAAR) 100 MG tablet, Take 0.5 tablets (50 mg total) by mouth once daily. (Patient taking differently: Take 50 mg by mouth every evening.), Disp: 45 tablet, Rfl: 3    metFORMIN (GLUCOPHAGE) 500 MG tablet, Take 1 tablet (500 mg total) by mouth 2 (two) times daily with meals., Disp: 60 tablet, Rfl: 3    promethazine (PHENERGAN) 25 MG tablet, Take 1 tablet (25 mg total) by mouth every 4 to 6 hours as needed for Nausea., Disp: 30 tablet, Rfl: 2    traZODone (DESYREL) 50 MG tablet, TAKE 1 TABLET BY MOUTH NIGHTLY AS NEEDED FOR INSOMNIA., Disp: 90 tablet, Rfl: 1    zolpidem (AMBIEN) 5 MG Tab, 1 tablet (5 mg total) by Per G Tube route nightly as needed (difficult with sleep)., Disp: 30 tablet, Rfl: 0  No current facility-administered medications for this visit.    Facility-Administered Medications Ordered in Other Visits:     aprepitant (CINVANTI) injection 130 mg, 130 mg, Intravenous, 1 time in Clinic/HOD, Venu Tamez MD    CISplatin (Platinol) 40 mg/m2 = 66 mg in sodium chloride 0.9% 631 mL chemo infusion, 40 mg/m2 (Treatment Plan Recorded), Intravenous, 1 time in Clinic/HOD, Venu Tamez MD    heparin, porcine (PF) 100 unit/mL injection flush 500 Units, 500 Units, Intravenous, PRN, Venu Tamez MD    palonosetron 0.25mg/dexAMETHasone 12mg in NS IVPB 0.25 mg 50 mL, 0.25 mg, Intravenous, 1 time in Clinic/HOD, Venu Tamez MD    sodium chloride 0.9% 1,000 mL with magnesium sulfate 1 g, potassium chloride 20 mEq infusion, 150 mL/hr, Intravenous, 1 time in Clinic/HOD, Venu Tamez MD, Last Rate: 150 mL/hr at 02/27/23 0742, 150 mL/hr at 02/27/23 0742    sodium chloride 0.9%  "bolus 1,000 mL 1,000 mL, 1,000 mL, Intravenous, 1 time in Clinic/HOD, Venu Tamez MD    sodium chloride 0.9% flush 10 mL, 10 mL, Intravenous, PRN, Venu Tamez MD    PMHx/PSHx Updates:  See patient's last visit with Dr. Tamez on 2/22/2023  See H&P on 9/23/2022        Pathology:   Cancer Staging   Larynx cancer  Staging form: Larynx - Glottis, AJCC 8th Edition  - Clinical stage from 8/6/2020: Stage II (cT2, cN0, cM0) - Signed by Fariha Muñoz NP on 8/6/2020  - Pathologic stage from 1/20/2022: Stage LOU (pT4a, pN0, cM0) - Signed by Fariha Muñoz NP on 1/20/2022  Staging form: Pharynx - P16 Negative Oropharynx, AJCC 8th Edition  - Clinical: Stage II (rcT2, cN0, cM0) - Signed by Matheus Portillo Jr., MD on 5/30/2022    Malignant neoplasm of base of tongue  Staging form: Pharynx - P16 Negative Oropharynx, AJCC 8th Edition  - Clinical stage from 5/20/2022: Stage II (cT2, cN0, cM0, p16-) - Signed by Saúl Kessler MD on 7/7/2022    Dissection 11/9/2022:    Final Pathologic Diagnosis 1. "left submandibular lymph node", dissection:       - Three lymph nodes, negative for carcinoma (0/3)   2. 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       - Tumor size: 4.5 cm       - Resection margins: left superior pharyngeal margin focally involved;   all other margins are negative for carcinoma       - Left internal jugular vein: free of tumor       - One lymph node, negative for carcinoma (0/1)   Note: P16 immunostain is negative     Tumor Site       Oropharynx  involving Base of tongue       Tumor Laterality       Midline       Tumor Size       Greatest Dimension (Centimeters) 4.5 cm         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)       Histologic Grade       G2 to G3: Moderate to Poorly differentiated       Lymphovascular Invasion       Not identified       Perineural Invasion       " Not identified     MARGINS      Margins       Involved by invasive tumor     Margin(s)   Involved by Invasive Tumor:      left superior pharyngeal margin; all other   margins are free of tumor     LYMPH NODES    Regional Lymph Node Status:    :     Regional Lymph Node   Status:      All regional lymph nodes negative for tumor      pT Category:               pT3   pN Category:               pN0      Base of Tongue Biopsy  4/27/2022:  Final Pathologic Diagnosis BIOPSY OF BASE OF THE TONGUE:   THE INFILTRATING POORLY DIFFERENTIATED SQUAMOUS CELL CARCINOMA   THE TUMOR IS P16 NEGATIVE.  THE POSITIVE AND NEGATIVE CONTROLS STAINED   APPROPRIATELY      Larynx resection/thyroidectomy 1/6/2022:     Final Pathologic Diagnosis 1. Lymph nodes, left neck levels 2,  3 and 4, dissection:   - Nineteen lymph nodes, all  negative  for metastatic carcinoma (0/19)   2. Lymph nodes, right neck levels 2,  3 and 4, dissection:   - Twenty-eight lymph nodes, all  negative  for metastatic carcinoma (0/28)   3. Possible parathyroid gland, excision:   - Benign parathyroid gland tissue (0.003 g)   4. 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 Soft tissue, posterior tracheal margin, excision:   - Benign, focally reactive respiratory mucosa with submucosal acute   inflammation,  negative  for dysplasia or malignancy   6. Soft tissue, anterior tracheal margin, excision:   - Benign respiratory mucosa with subepithelial cartilage,  negative  for   dysplasia or malignancy   7. Soft  "tissue, posterior tracheal margin #2, excision:   - Benign, focally reactive respiratory mucosa with submucosal acute   inflammation,  negative  for dysplasia or malignancy   8. Soft tissue, anterior tracheal margin #2, excision:   - Benign, reactive focally ulcerated respiratory mucosa with submucosal acute   inflammation,  negative  for dysplasia or malignancy             Laryngoscope 8/4/2020: (with Dr Noel):  Final Pathologic Diagnosis 1.  Left true vocal fold, biopsy:       -  Invasive moderately differentiated squamous cell carcinoma,   keratinizing type   2.  Left true vocal fold, biopsy:       -  Invasive moderately differentiated squamous cell carcinoma,   keratinizing type             Laryngoscope 3/17/2020 (with Dr Xiao):  Final Pathologic Diagnosis 1.  BIOPSY OF LEFT TRUE VOCAL CORD:   SEVERELY DYSPLASTIC APPEARING SQUAMOUS MUCOSA   INVASIVE CARCINOMA IS NOT DOCUMENTED   ON THE OTHER HAND, THESE FRAGMENTS ARE NODUL AND WITHOUT SUBMUCOSA FOR   EVALUATION; IT IS POSSIBLE THAT THEY REFLECT INVASIVE SQUAMOUS CARCINOMA   2.  BIOPSY OF LEFT ANTERIOR COMMISSURE:   MODERATE DYSPLASIA   NO INVASIVE CARCINOMA IDENTIFIED             Objective:     Vitals:  Blood pressure 108/71, pulse 82, temperature 97.3 °F (36.3 °C), resp. rate 17, height 5' 6" (1.676 m), weight 57.5 kg (126 lb 11.2 oz), SpO2 100 %.    Physical Examination:   GEN: no apparent distress, comfortable; AAOx3  HEAD: atraumatic and normocephalic  EYES: no pallor, no icterus, PERRLA  ENT: OMM, no pharyngeal erythema, external ears WNL; no nasal discharge; no thrush; s/p laryngectomy with flap since healed well; trach   NECK: no masses, thyroid normal, trachea midline, no LAD/LN's, supple; post-op dissection healed well;    CV: RRR with no murmur; normal pulse; normal S1 and S2; no pedal edema; portacath   CHEST: Normal respiratory effort; CTAB; normal breath sounds; no wheeze or crackles  ABDOM: nontender and nondistended; soft; normal bowel sounds; " no rebound/guarding; peg tube  MUSC/Skeletal: ROM normal; no crepitus; joints normal; no deformities or arthropathy  EXTREM: no clubbing, cyanosis, inflammation or swelling  SKIN: no rashes, lesions, ulcers, petechiae or subcutaneous nodules  : no mahan  NEURO: grossly intact; motor/sensory WNL; AAOx3; no tremors  PSYCH: normal mood, affect and behavior  LYMPH: normal cervical, supraclavicular, axillary and groin LN's          Labs:     Lab Results   Component Value Date    WBC 3.44 (L) 02/24/2023    HGB 10.0 (L) 02/24/2023    HCT 29.4 (L) 02/24/2023    MCV 93 02/24/2023     02/24/2023     CMP  Sodium   Date Value Ref Range Status   02/24/2023 137 136 - 145 mmol/L Final     Potassium   Date Value Ref Range Status   02/24/2023 4.1 3.5 - 5.1 mmol/L Final     Chloride   Date Value Ref Range Status   02/24/2023 102 95 - 110 mmol/L Final     CO2   Date Value Ref Range Status   02/24/2023 27 23 - 29 mmol/L Final     Glucose   Date Value Ref Range Status   02/24/2023 101 70 - 110 mg/dL Final     BUN   Date Value Ref Range Status   02/24/2023 22 8 - 23 mg/dL Final     Creatinine   Date Value Ref Range Status   02/24/2023 0.8 0.5 - 1.4 mg/dL Final     Calcium   Date Value Ref Range Status   02/24/2023 8.6 (L) 8.7 - 10.5 mg/dL Final     Total Protein   Date Value Ref Range Status   02/24/2023 7.0 6.0 - 8.4 g/dL Final     Albumin   Date Value Ref Range Status   02/24/2023 4.2 3.5 - 5.2 g/dL Final   11/18/2020 3.7 3.6 - 5.1 g/dL Final     Comment:     For additional information, please refer to   http://education.[employer].ePantry/faq/NOH981 (This link is   being provided for informational/ educational purposes only.)  This test was developed and its analytical performance   characteristics have been determined by [facility]. It has not been cleared or approved by the   [place] Food and Drug Administration. This assay has been validated   pursuant to the CLIA regulations and is used  for clinical   purposes.  @ Test Performed By:  [facility]  Yo Cortes M.D.,   [address] [place] [address]  [facility]  [id number]       Total Bilirubin   Date Value Ref Range Status   02/24/2023 0.8 0.1 - 1.0 mg/dL Final     Comment:     For infants and newborns, interpretation of results should be based  on gestational age, weight and in agreement with clinical  observations.    Premature Infant recommended reference ranges:  Up to 24 hours.............<8.0 mg/dL  Up to 48 hours............<12.0 mg/dL  3-5 days..................<15.0 mg/dL  6-29 days.................<15.0 mg/dL       Alkaline Phosphatase   Date Value Ref Range Status   02/24/2023 303 (H) 55 - 135 U/L Final     AST   Date Value Ref Range Status   02/24/2023 40 10 - 40 U/L Final     ALT   Date Value Ref Range Status   02/24/2023 71 (H) 10 - 44 U/L Final     Anion Gap   Date Value Ref Range Status   02/24/2023 8 8 - 16 mmol/L Final     eGFR if    Date Value Ref Range Status   07/29/2022 >60.0 >60 mL/min/1.73 m^2 Final     eGFR if non    Date Value Ref Range Status   07/29/2022 >60.0 >60 mL/min/1.73 m^2 Final     Comment:     Calculation used to obtain the estimated glomerular filtration  rate (eGFR) is the CKD-EPI equation.          Lab Results   Component Value Date    IRON 30 (L) 11/25/2022    TRANSFERRIN 114 (L) 11/25/2022    TIBC 169 (L) 11/25/2022    FESATURATED 18 (L) 11/25/2022            Radiology/Diagnostic Studies:      CTA Neck    Result Date: 9/20/2022  EXAMINATION: CTA NECK CLINICAL HISTORY: Head/neck cancer, monitor;Malignant neoplasm of base of tongue TECHNIQUE: CT angiogram was performed from the level of the scott to the EAC following the IV administration of 75mL of Omnipaque 350.   Sagittal and coronal reconstructions and maximum intensity projection reconstructions were performed. Arterial stenosis percentages are based on NASCET  measurement criteria. COMPARISON: CTA neck dated 05/20/2022 FINDINGS: Aortic arch and great vessels: There is a conventional left-sided 3 vessel arch.  The aortic arch is widely patent.  There is stable soft and calcified plaque in the proximal left subclavian artery with a similar appearance the prior study and possibly within radiation field but without critical stenosis or occlusion.  The right subclavian artery is patent.  The brachiocephalic trunk and origin of the left common carotid artery are patent. Carotid arteries Right carotid artery: There is calcified plaque scattered in the right common carotid artery without critical stenosis, occlusion or change.  There is no measurable stenosis of the internal carotid artery which is patent to the skull base. Left carotid artery: There is mild plaque scattered in the left common carotid artery without change and without critical stenosis or occlusion.  There is no measurable stenosis of the internal carotid artery. Vertebral arteries: The left vertebral artery is dominant and patent throughout its course in the neck.  The right vertebral artery is hypoplastic but patent.  There is no critical stenosis, occlusion, thrombus or dissection.  There is no change. The study extends intracranially.  There is calcified plaque in the V4 segment of the left vertebral artery resulting in a moderate to marked short segment nonocclusive stenosis.  The right vertebral artery partially terminates in a posteroinferior cerebellar artery with a short segment moderate to marked stenosis of the very distal right vertebral artery.  Some flow within the distal right vertebral artery may represent retrograde flow from the dominant left vertebral artery.  The basilar artery is patent.  The origins of the superior cerebellar and posterior cerebral artery on the right are patent.  There is fetal origin of the left posterior cerebral artery which is patent. There is plaque in the cavernous  segments of both internal carotid arteries but there is no critical stenosis or large vessel occlusion of the anterior circulation vessels.  There is no large aneurysm. Other: There is a similar appearance of the included upper lungs with pleural and parenchymal changes anteriorly in the upper lobes bilaterally.  As previously discussed, timing of the contrast bolus is performed during the arterial phase for CTA neck.  Therefore, enhancement of the soft tissues is somewhat suboptimal due to this technique. There has been a large region of soft tissue resection in the anterior mid and lower neck soft tissues with continued open defect in the upper chest and lower neck above the manubrium.  Soft tissue seen along the left posterolateral border of the trachea could represent secretions or mucus.  This was present previously. Redemonstrated is a large heterogeneously enhancing lesion centered at the level of the tongue base and floor of the mouth centrally into the left.  There is scattered calcifications.  The prior CTA demonstrated regions of central necrosis which are no longer definitely present but diffusely heterogeneous density and enhancement likely represents regions of necrosis.  This large heterogeneously enhancing soft tissue mass extends more anteriorly in the floor of the mouth on the left and measures at least 5.6 cm in greatest anterior to posterior dimension with 3.6 cm transverse dimension.  This does extend anteriorly to the medial margin of the body of the mandible on the left. There are post treatment changes with stranding in the neck fat.     1. Similar appearance of the neck vessels when compared to the prior CTA neck with mild narrowing at the origin of the left subclavian artery.  There is no critical stenosis, occlusion, thrombosis or dissection involving the cervical vertebral or carotid arteries. 2. Larger mass within the hypopharynx and tongue base extending anteriorly to the floor of the  mouth on the left to the medial margin of the body of the mandible without obvious bony destruction. 3. There is nonocclusive stenosis of the distal V4 segment of the left vertebral artery without change. 4. There is no large vessel occlusion or critical stenosis of the anterior circulation. 5. There is developmental variation with fetal origin of the left posterior cerebral artery. Electronically signed by: Rex Joyner MD Date:    09/20/2022 Time:    11:31    CT Abdomen Pelvis With Contrast    Result Date: 9/1/2022  CMS MANDATED QUALITY DATA - CT RADIATION - 436 All CT scans at this facility utilize dose modulation, iterative reconstruction, and/or weight based dosing when appropriate to reduce radiation dose to as low as reasonably achievable. Reason: abdominal pain partial history of laryngeal carcinoma TECHNIQUE: CT abdomen and pelvis with 100 mL Omnipaque 350. COMPARISON: PET/CT 5/13/2022 CT ABDOMEN: Coronary artery calcification and extremely tiny hiatal hernia incidentally noted. Visualized lung bases are clear. Liver, gallbladder, pancreas, spleen, adrenals, and kidneys are normal. Mild aortoiliac calcifications present. Gastrostomy tube tip lies in distal gastric body. Small intestines are unremarkable. Mild wall thickening affects the ascending colon with pneumatosis intestinalis diffusely affecting the ascending colon. No other free intraperitoneal gas or, and remainder of colon is normal. A normal appendix is present. The major mesenteric vascular structures are patent. No acute osseous abnormality. CT PELVIS: Prostate is slightly enlarged. Bladder is normal. No free pelvic fluid. Tiny right and small to moderate left fat-containing inguinal hernias are present. No acute osseous abnormality. IMPRESSION: 1. Pneumatosis intestinalis affecting the ascending colon with associated mild wall thickening. Etiology of this is undetermined. Potential considerations include intestinal ischemia or pneumatosis  related to chemotherapy. Clinical and laboratory correlation is needed to assess significance. No portal venous gas or free intraperitoneal air however. 2. Coronary artery calcifications Electronically signed by:  Nael Hui MD  9/1/2022 6:20 PM CDT Workstation: [id number]    NM PET CT Routine Skull to Mid Thigh    Result Date: 9/27/2022  PET CT WITH IMAGE FUSION HISTORY:  restage tongue cancer RESTAGING...  HX: TONGUE CA.  DX: April 2022.  LAST CHEMO TREATMENT WAS 3WKS AGO.  EVALUATE TX RESPONSE.   ALSO HX OF LARYNGEAL CA IN AUGUST 2020.  MULTIPLE SURGERIES: LARYNGECTOMY, THYROIDECTOMY & TRACHEOSTOMY.  RAD TX WAS COMPLETED IN NOV 2020. TECHNIQUE: Following IV administration of 11.2 mCi of F-18 labeled FDG into right antecubital fossa and a 60 minute delay, PET CT was performed from the vertex of the skull through the proximal thighs with an integrated PET CT scanner with image fusion. CT images were obtained to aid in attenuation correction and PET localization. The patient's serum glucose at the time of the exam was 136 mg/dL. COMPARISON: PET/CT 5/13/2022 FINDINGS: Poorly characterized soft tissue mass involving base of tongue approximately measures 4.3 x 2.8 cm (series 3 image 65) appearing similar to the prior exam. This reaches current max SUV of 11.8, not significantly changed from prior of 11.4. No other abnormal FDG activity. Intracranial compartment is unremarkable. Trace bilateral maxillary sinus mucosal thickening is present. Postoperative changes of laryngectomy and tracheostomy are present. Surgical clips occur throughout the neck. No definite enlarged cervical or supraclavicular lymph node, with evaluation limited by lack of IV contrast. Left subclavian port catheter tip terminates in SVC. Diffuse coronary artery calcifications are present. No enlarged mediastinal or axillary lymph nodes. No pulmonary nodule or mass. Gastrostomy tube tip lies in gastric body. Moderate aortoiliac calcifications are  present. Small fat-containing left inguinal hernia incidentally noted. Mild degenerative changes affect the spine. No suspicious osseous abnormality. IMPRESSION: 1. No significant change in the FDG avid mass involving the tongue base, remaining characteristic of malignancy. 2. No new FDG avid malignancy or metastatic disease. Electronically signed by:  Nael Hui MD  9/27/2022 2:38 PM CDT Workstation: [id number]    CT Abdomen Pelvis  Without Contrast    Result Date: 9/4/2022  CMS MANDATED QUALITY DATA - CT RADIATION  436 All CT scans at this facility utilize dose modulation, iterative reconstruction, and/or weight based dosing when appropriate to reduce radiation dose to as low as reasonably achievable. CT ABDOMEN PELVIS WITHOUT IV CONTRAST CLINICAL HISTORY: 72 years Male Follow-up pneumatosis COMPARISON: CT abdomen and pelvis September 1, 2022 FINDINGS: Imaging through the lower thorax demonstrates subsegmental atelectasis of the dependent lower lobes. Coronary artery calcification. Bone window images show no acute or aggressive osseous abnormality. Transitional lumbosacral vertebral body. On this unenhanced exam, no focal hepatic lesion. Gallbladder and biliary tree are unremarkable. Spleen appears normal. Pancreas is unremarkable. No adrenal lesion. No renal calculi or hydronephrosis. Ureters are normal in caliber. Urinary bladder is within normal limits. Gastrostomy tube is in place within the stomach. Stomach is largely collapsed. No evidence of small bowel obstruction. Pneumatosis and pericolonic abscess involving the ascending colon is redemonstrated, slightly diminished compared to prior. Mild wall thickening throughout the colon. No free fluid or free air within the abdomen or pelvis. Aortoiliac atherosclerotic calcification. No pathologically enlarged lymph nodes within the abdomen or pelvis. Bilateral fat-containing inguinal hernias, larger on the left. IMPRESSION: Pneumatosis and pericolonic gas about  the ascending colon has decreased in volume compared to prior. Persistent colonic wall thickening which could indicate colitis. Coronary and aortic atherosclerosis. Gastrostomy tube is within the stomach. Bilateral inguinal hernias. Electronically signed by:  Jose De Jesus Oleary MD  9/4/2022 4:06 PM CDT Workstation: [id number]    CTA neck  5/20/2022:     IMPRESSION:  Persistent mass within the hypopharynx consistent with malignancy.  By my measurements it is larger than on April 24, 2022 with central necrosis.  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 This is similar to the previous April 2022 study.  No evidence of arterial compromise related to malignancy.     PET 5/13/2022:     IMPRESSION:  1. Postoperative changes of prior laryngectomy and lymph node resection/radiation.  2. Masslike FDG avid lesion to the left of midline at the tongue base concerning for residual/recurrent disease.  3. Adjacent confluent nodular extension along the inferior left side of the mass.  4. No FDG avid lymphadenopathy or convincing additional sites of disease in the chest, abdomen or pelvis.     CT head 5/10/2022:  FINDINGS: Comparison to multiple prior exams. There is no acute intracranial hemorrhage, with no mass effect or abnormal extra-axial fluid. Mild scattered areas of nonspecific hypoattenuation involve the deep periventricular white matter, with gray-white differentiation maintained.     There is mild generalized prominence of the cortical sulci and ventricles. The cerebellum and brainstem are unremarkable. There are carotid siphon vascular calcifications. The visualized paranasal sinuses and mastoid air cells are clear. There is no acute calvarial fracture or scalp hematoma.     IMPRESSION: No acute intracranial hemorrhage or acute calvarial fracture     CT soft neck 4/24/2022;     Oral tongue/tongue base:  At the base of the tongue on the left there are findings of  a heterogeneously enhancing mass measuring 2.1 cm highly concerning for a neoplastic process.     True and false cords:  Patient status post laryngectomy which has been performed in the interim. Soft tissue stranding in the lower neck likely related to a previous flat reconstruction. No enhancement or lymphadenopathy within the lower neck.     Lymph node assessment:Negative     Surrounding soft tissues:  Negative     Vasculature:  Negative     Osseous structures:  Negative     Lung apices:  Scarring involving the lung apices bilaterally likely related to previous radiation.     IMPRESSION:  1. Postoperative and radiation changes involving the lower neck.  2. Findings highly concerning for a developing mass at the left base of the tongue enhancing 2.1 cm region. Direct visualization in this region would be of benefit.        CT soft neck  12/24/2021  IMPRESSION:  1.  Laryngeal mass as on prior exam. Laryngeal airway narrowing has not changed.  2.  No evidence for active hemorrhage.  3.  Partially visualized patchy groundglass infiltrates in both upper lobes. Also see CT chest report     CTA Chest 12/24/2021:  IMPRESSION:     1.  No pulmonary embolism.     2.  Soft tissue stranding in the region of the strap musculature with ill-defined hypodensity measuring 2.8 x 1.4 cm in the cervical midline.  Findings concerning for infectious process or abscess. Neoplastic process could have similar imaging characteristics.     3.  Suggested erosion of the proximal right parasymphyseal aspect of the thyroid shield.  Correlated with CT soft tissue neck findings. Findings could represent osteomyelitis. Neoplastic process cannot be excluded.     4.  Hepatic steatosis.  5.  Thickening of the gastric wall. Prominence of perigastric vasculature. Small hiatal hernia.     6.  Moderate stool burden.  Correlate for constipation.        PET 12/15/2021:  IMPRESSION:     Substantial qualitative and quantitative increase of hypermetabolic FDG  activity associated with the larynx in this patient with known supraglottic neoplasm.     No evidence of FDG avid metastatic disease involving neck, chest, abdomen or pelvis.     PET 8/21/2020:     Impression:     1. Intensely hypermetabolic plaque-like mass along the left true vocal cord, compatible with known laryngeal carcinoma.  2. No findings of regional metastatic disease in the neck, or distant metastatic disease.        CT Chest 8/10/2020:     IMPRESSION:     No metastatic disease within the chest.  Three-vessel coronary artery calcification. Nondilated cardiac  chambers     CT Soft neck 7/22/2020:  IMPRESSION:  1. Slight interval increase in size of the previously described  enhancing nodule along the anterior left vocal cord.  2. No pathologic lymphadenopathy.  3. Additional and incidental findings as noted above.     CT Soft neck  3/16/2020:     Impression:     6 mm enhancing focus involving the superior aspect of the left focal cord near the anterior commisure.  Recommend direct visualization for further evaluation of laryngeal polyp     Question of 2 mm submucosal lipoma involving the midportion of the superior aspect of the left vocal cord.     Mild arteriosclerosis involving the brachiocephalic arteries and carotid arteries     Mild degenerative change of the cervical spine        I have reviewed all available lab results and radiology reports.    Assessment/Plan:   (1) 72 y.o. 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     - Patient has been under the care of Dr Khoobehi with [facility] and Dr Portillo with rad/onc.   - He was recently found to have a new primary cancer involving the base of his tongue in April 2022. He was deemed not to be a candidate for any further radiation and did not want to undergo any further surgery as this would necessitate a glossectomy  procedure.   - He has been on adjuvant chemotherapy per direction of Dr Khoobehi and has had 3 cycles. His therapy course has been complicated with persistent diarrhea and N/V.      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 He saw Dr Lucero with Rad/onc this am. They are going to present his case to H&N Tumor Board at [facility] and plans to see Dr James about surgical options. If he is not a surgical candidate then will proceed with cisplatin and XRT combine therapy.     10/6/2022:  - He saw Dr Lucero with Rad/onc, and Dr James and his case was presented to H&N Tumor Board at [facility] and  he general consensus was to proceed to surgery.  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   - He had his portacath removed on 11/28.   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, keratinizing type, moderate to poorly differentiated tumor  - Four LN's were all negative  - he did have a positive left superior pharyngeal margin  - pT3 pN0  - reviewed the latest NCCN guidelines again from Version 1.2023; he may need some further systemic therapy due to the margin  -  check on Rad/onc follow-up     1/23/2023:  - s/p new portacath placed on 1/12/2023 with Dr Le  - starting combined chemotherapy and XRT - cisplatin  - s/p chemotherapy school with Elyssa    2/6/2023:  - he seems to be tolerating the chemotherapy well at this time  - continued with concomitant therapy with XRT    2/22/2023:  - he seems to be tolerating the chemotherapy well at this time  - continued with concomitant therapy with XRT  - labs relatively adequate  - will check on his refills    2/27/2023:   - refill phenergan medication for nausea   - continue liquid calcium 4 times a day   - 6/7 chemotherapy today   - will more than likely need PT on neck to help with ROM post radiation      (2) Hx/of Laryngeal cancer in Aug 2020 stage II (cT2 N0)  - s/p radiation followed by bilateral neck dissection and total thyroidectomy     Brief Oncology Summary for his laryngeal CA hx:     Patient was noted to initially have a suspicious lesion on the left true vocal cord by laryngoscopy with Dr Xiao in March 2020 with biopsy showing severe dysplastic changes without definitive invasive process. He had a CT scan on 3/16/2020 which showed a 6mm nodule on the left vocal cord. A repeat Ct scan four months later on 7/22/2020 showed the nodule enlarged to 1.4 x 1.1 cm in size. Patient was then seen by Dr Noel and underwent repeat scope in Aug 2020 who found a bulky deeply invading tumor which was debulked and the pathology coming back moderately differentiated SCCA without lymphovascular or perineural invasion. Hs case was subsequently presented to the tumor board and the consensus was to proceed with radiation. He completed XRT on 10/30/2020 and proceeded with regular laryngoscopy surveillance. He eventually required salvage laryngectomy and neck dissection with flap with Dr James on Jan 6th 2022. Pathology from the resection showed a 4.2cm Invasive, well to moderately differentiated, keratinizing squamous cell carcinoma  which was invading the left lobe of the thyroid. Fifty lymph nodes were removed which were all negative. Pathology TNM was pT4a, pN0. Patient did not require any adjuvant therapy afterwards.      (2) HTN and hypercholesterolemia     (3) Paroxysmal atrial fibrillation, V tach     (4) DM II     (5) B12 deficiency      (6) Fatty liver disease, GERD           VISIT DIAGNOSES:      Laryngeal cancer    Chemotherapy-induced nausea  -     promethazine (PHENERGAN) 25 MG tablet; Take 1 tablet (25 mg total) by mouth every 4 to 6 hours as needed for Nausea.  Dispense: 30 tablet; Refill: 2    Hypocalcemia            PLAN:  Continued with systemic concomitant therapy with Cisplat and XRT    s/p chemotherapy school with me- discussed the side-effect profile, provided literature and obtained consents  F/u Rad/onc follow-up as directed  F/u with Dr James with ENT and Dr Lucero with rad/onc  Check labs weekly  F/u with PCP, ENT, etc  Dietician f/u on the tube feeds  Continue liquid calcium   Refill phenergan for chemo induced nausea      RTC in 1 week with Dr. Tamez and 2 weeks with me        Discussion:     COVID-19 Discussion:     I had long discussion with patient and any applicable family about the COVID-19 coronavirus epidemic and the recommended precautions with regard to cancer and/or hematology patients. I have re-iterated the CDC recommendations for adequate hand washing, use of hand -like products, and coughing into elbow, etc. In addition, especially for our patients who are on chemotherapy and/or our otherwise immunocompromised patients, I have recommended avoidance of crowds, including movie theaters, restaurants, churches, etc. I have recommended avoidance of any sick or symptomatic family members and/or friends. I have also recommended avoidance of any raw and unwashed food products, and general avoidance of food items that have not been prepared by themselves. The patient has been asked to call us  "immediately with any symptom developments, issues, questions or other general concerns.         Pathology Discussion:     I reviewed and discussed the pathology report(s) and radiograph reports (if available) in as simple to understand and/or laymen's terms to the best of my ability. I had an indepth conversation with the patient and went over the patient's individual diagnosis based on the information that was currently available. I discussed the TNM staging process with regard to the patient's particular cancer type, and the calculated stage based on the currently available TNM data and literature. I discussed the available prognostic data with regard to the current staging information and how it relates to the prognosis of their particular neoplastic process.          NCCN Guidelines:     I discussed the available treatment option(s) in accordance with the latest literature from the NCCN Clinical Practice Guidelines for the patient's particular type of cancer disorder. The NCCN Guidelines provide a "document evidence-based (and) consensus-driven management" of the care of oncology patients. The treatment recommendations were made not only in accordance to the NCCN guidelines, but also factored in to account the patient's overall age, condition, performance status and their medical co-morbidities. I went over the risks and benefits of the the treatment options (if any could be made) with regard to their particular cancer type, their cancer stage, their age, and their co-morbidities.         Chemotherapy Discussion:      I discussed the available treatment option(s) in accordance with the latest/current national evidence-based guidelines (NCCN, UpToDate, NCI, ASCO, etc where applicable), their overall age/condition and their co-morbidities. I also went over the risks and benefits of the chemotherapy with regard to their particular cancer type, their cancer stage, their age/condition, and their co-morbidities. I " provided literature on the chemotherapy regimen and discussed the chemotherapy side-effect profiles of the drug(s). I discussed the importance of compliance with obtaining and monitoring weekly lab work, and went over the potential hematopathology issues and risks with anemia, leucopenia and thrombocytopenia that can occur with chemotherapy. I discussed the potential risks of liver and kidney damage, which could be permanent and could necessitate dialysis long-term if kidney failure developed. I discussed the emetic and/or diarrheal potential of the regimen and the potential need for use of antiemetic and anti-diarrheal medications. I discussed the risk for development of anaphylactic shock, bronchospasm, dysrhythmia, and respiratory/cardiovascular arrest and/or failure. I discussed the potential risks for development of alopecia, cold sensory issues, ringing in ears, vertigo, cataracts, glaucoma, and neuropathy, all of which could end up being chronic and life-long. Some chemotherpyI discussed the risks of hand-foot syndrome and rashes, and development of other autoimmune mediated processes such as pneumonitis, hepatitis, and colitis which could be life threatening. I discussed the risks of the potential development of a rare but fatal viral mediated disease known as PML (Progressive Multifocal Leukoencephalopathy), and risk of future development of leukemia and/or lymphoma from use of certain chemotherapy agents. I discussed the need for neutropenic precautions, basic hygiene/sanitation behaviors and dietary restrictions.    The patient's consent has been obtained to proceed with the chemotherapy.The patient will be referred to Chemotherapy School /[facility] for training and education on chemotherapy, use of antiemetics and/or anti-diarrheals, use of NSAID's, potential chemotherapy side-effects, and any specific recommendations and precautions with the particular chemotherapy agents.      I answered all  of the patient's (and family's, if applicable) questions to the best of my ability and to their complete satisfaction. The patient acknowledged full understanding of the risks, recommendations and plan(s).     I have explained and the patient understands all of  the current recommendation(s). I have answered all of their questions to the best of my ability and to their complete satisfaction.             I have explained all of the above in detail and the patient understands all of the current recommendation(s). I have answered all of their questions to the best of my ability and to their complete satisfaction.   The patient is to continue with the current management plan.    Electronically signed by Briana Martinez, MSN,APRN,AGNP-C        Answers submitted by the patient for this visit:  Review of Systems Questionnaire (Submitted on 2/20/2023)  appetite change : No  unexpected weight change: No  mouth sores: No  visual disturbance: No  cough: No  shortness of breath: Yes  chest pain: No  abdominal pain: No  diarrhea: No  frequency: No  back pain: No  rash: No  headaches: No  adenopathy: No  nervous/ anxious: No     Abbe Flap (Lower To Upper Lip) Text: The defect of the upper lip was assessed and measured.  Given the location and size of the defect, an Abbe flap was deemed most appropriate.  Using a sterile surgical marker, an appropriate Abbe flap was measured and drawn on the lower lip. Local anesthesia was then infiltrated. A scalpel was then used to incise the upper lip through and through the skin, vermilion, muscle and mucosa, leaving the flap pedicled on the opposite side.  The flap was then rotated and transferred to the lower lip defect.  The flap was then sutured into place with a three layer technique, closing the orbicularis oris muscle layer with subcutaneous buried sutures, followed by a mucosal layer and an epidermal layer.

## 2023-02-27 NOTE — PROGRESS NOTES
Medical Nutrition Therapy Oncology Progress Note      Patient's PCP:Maico Patterson MD  Referring Provider: No ref. provider found  Subjective:        Patient ID: Cyrus Batres Jr. is a 72 y.o. male.    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    PEG (percutaneous endoscopic gastrostomy) adjustment/replacement/removal 11/22/2022    Postoperative 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    Tongue cancer     Tracheostomy dependence     Type 2 diabetes mellitus, without long-term current use of insulin 11/22/2022       Past Surgical History:   Procedure Laterality Date    DIRECT LARYNGOBRONCHOSCOPY N/A 12/27/2021    Procedure: LARYNGOSCOPY, DIRECT, WITH BRONCHOSCOPY;  Surgeon: Flex Espinosa MD;  Location: [facility] OR [address] [place] [address];  Service: ENT;  Laterality: N/A;    DIRECT LARYNGOSCOPY  11/9/2022    Procedure: LARYNGOSCOPY, DIRECT;  Surgeon: Jesse James MD;  Location: [facility] OR [address] [place] [address];  Service: ENT;;    DISSECTION OF NECK Bilateral 1/6/2022    Procedure: DISSECTION, NECK;  Surgeon: Jesse James MD;  [facility];  Service: ENT;  Laterality: Bilateral;    DISSECTION OF NECK Bilateral 11/9/2022    Procedure: DISSECTION, NECK;  Surgeon: Jesse James MD;  Location: [facility] OR [address] [place] [address];  Service: ENT;  Laterality: Bilateral;    FLAP PROCEDURE Right 1/6/2022    Procedure: CREATION, FREE FLAP;  Surgeon: Alise Hart MD;  Location: [facility] OR [address] [place] [address];  Service: ENT;  Laterality: Right;  Ischemic start 1351  Ischemic stop 1502    FLAP PROCEDURE Left 11/9/2022     Procedure: CREATION, FREE FLAP, ALT;  Surgeon: Alise Hart MD;  Location: [facility] OR 2ND FLR;  Service: ENT;  Laterality: Left;    GLOSSECTOMY Bilateral 11/9/2022    Procedure: TOTAL GLOSSECTOMY;  Surgeon: Jesse James MD;  Location: [facility] OR 2ND FLR;  Service: ENT;  Laterality: Bilateral;    INSERTION OF TUNNELED CENTRAL VENOUS CATHETER (CVC) WITH SUBCUTANEOUS PORT N/A 6/9/2022    Procedure: [id number];  Surgeon: Jesus Viera MD;  Location: [facility] OR;  Service: General;  Laterality: N/A;    INSERTION OF TUNNELED CENTRAL VENOUS CATHETER (CVC) WITH SUBCUTANEOUS PORT Right 1/12/2023    Procedure: [id number];  Surgeon: Abdulaziz Le Jr., MD;  Location: [facility] OR;  Service: General;  Laterality: Right;    LARYNGECTOMY N/A 1/6/2022    Procedure: LARYNGECTOMY;  Surgeon: Jesse James MD;  [facility];  Service: ENT;  Laterality: N/A;    LARYNGOSCOPY N/A 8/4/2020    Procedure: Suspension microlaryngoscopy with biopsy, possible KTP laser treatment/excision;  Surgeon: Stew Noel MD;  [facility];  Service: ENT;  Laterality: N/A;  Microscope, telescopes, tower, microinstruments, KTP laser, rep conf# [id number] IC 7/28.    LARYNGOSCOPY N/A 3/16/2021    Procedure: Suspension microlaryngoscopy with excision of lesion, possible CO2 laser;  Surgeon: Stew Noel MD;  [facility];  Service: ENT;  Laterality: N/A;  Microscope, telescopes, tower, microinstruments, CO2 laser, rep conf# [id number] IC 3/4.    LARYNGOSCOPY N/A 4/1/2021    Procedure: Suspension microlaryngoscopy with KTP laser excision of lesion;  Surgeon: Stew Noel MD;  [facility];  Service: ENT;  Laterality: N/A;  Microscope, telescopes, tower, microinstruments, 70 degree scope, vocal fold , KTP laser, rep conf# [id number] BC    LARYNGOSCOPY N/A 12/9/2021    Procedure: Suspension microlaryngoscopy with biopsy;  Surgeon: Stew Noel MD;   Location: [facility] OR 2ND FLR;  Service: ENT;  Laterality: N/A;  Microscope, telescopes, tower, microinstruments    LARYNGOSCOPY N/A 1/6/2022    Procedure: LARYNGOSCOPY;  Surgeon: Jesse James MD;  Location: [facility] OR 2ND FLR;  Service: ENT;  Laterality: N/A;    LARYNGOSCOPY N/A 4/27/2022    Procedure: LARYNGOSCOPY WITH BIOPSY;  Surgeon: Jesse James MD;  Location: [facility] OR 2ND FLR;  Service: ENT;  Laterality: N/A;    MICROLARYNGOSCOPY N/A 3/17/2020    Procedure: MICROLARYNGOSCOPY;  Surgeon: Jung Xiao MD;  Location: [facility] OR;  Service: ENT;  Laterality: N/A;  Laser Microlaryngoscopy  NEED TO SCHEDULE LASER from [facility] [id number]    PHARYNGECTOMY  11/9/2022    Procedure: TOTAL PHARYNGECTOMY;  Surgeon: Jesse James MD;  Location: [facility] OR 2ND FLR;  Service: ENT;;    REIMPLANTATION OF PARATHYROID TISSUE N/A 1/6/2022    Procedure: REIMPLANTATION, PARATHYROID TISSUE;  Surgeon: Jesse James MD;  Location: [facility] OR 2ND FLR;  Service: ENT;  Laterality: N/A;    SKIN SPLIT GRAFT Right 11/9/2022    Procedure: APPLICATION, GRAFT, SKIN, SPLIT-THICKNESS;  Surgeon: Jesse James MD;  Location: [facility] OR 2ND FLR;  Service: ENT;  Laterality: Right;    THYROIDECTOMY  1/6/2022    Procedure: THYROIDECTOMY;  Surgeon: Jesse James MD;  Location: [facility] OR [facility] FLR;  Service: ENT;;    TRACHEOSTOMY N/A 12/27/2021    Procedure: CREATION, TRACHEOSTOMY;  Surgeon: Flex Espinosa MD;  Location: [facility] OR 2ND FLR;  Service: ENT;  Laterality: N/A;       Social History     Socioeconomic History    Marital status:      Spouse name: Janel Batres    Number of children: 2   Occupational History    Occupation: [employer]     Employer: [employer]   Tobacco Use    Smoking status: Never    Smokeless tobacco: Never   Substance and Sexual Activity    Alcohol use: Not Currently     Comment: occasional    Drug use: No    Sexual activity: Yes     Partners: Female   Social History Narrative    ** Merged  "History Encounter **         2 children from his 1st wife     Social Determinants of Health     Financial Resource Strain: Low Risk     Difficulty of Paying Living Expenses: Not hard at all   Food Insecurity: No Food Insecurity    Worried About Running Out of Food in the Last Year: Never true    Ran Out of Food in the Last Year: Never true   Transportation Needs: No Transportation Needs    Lack of Transportation (Medical): No    Lack of Transportation (Non-Medical): No   Physical Activity: Insufficiently Active    Days of Exercise per Week: 1 day    Minutes of Exercise per Session: 30 min   Stress: Stress Concern Present    Feeling of Stress : To some extent   Social Connections: Socially Isolated    Frequency of Communication with Friends and Family: Once a week    Frequency of Social Gatherings with Friends and Family: Once a week    Attends Confucianism Services: Never    Active Member of Clubs or Organizations: No    Attends Club or Organization Meetings: Never    Marital Status:    Housing Stability: Low Risk     Unable to Pay for Housing in the Last Year: No    Number of Places Lived in the Last Year: 1    Unstable Housing in the Last Year: No       Family History   Problem Relation Age of Onset    Abnormal EKG Mother     Diabetes Father     Heart disease Father     Hypertension Father        Review of patient's allergies indicates:   Allergen Reactions    Lovastatin Itching    Pollen extracts     Lovastatin Rash     Not confirmed but pt skeptical       Current Outpatient Medications:     acetaminophen (TYLENOL) 325 MG tablet, Take 325 mg by mouth every 6 (six) hours as needed for Pain., Disp: , Rfl:     BD ULTRA-FINE YULISSA PEN NEEDLE 32 gauge x 5/32" Ndle, To be used with Novolog pen up to 4x a day, Disp: 100 each, Rfl: 3    calcitrioL (ROCALTROL) 1 mcg/mL solution, Take 0.25 mLs (0.25 mcg total) by Per G Tube route once daily., Disp: 15 mL, Rfl: 12    calcium carbonate (TUMS) 200 mg calcium (500 mg) " chewable tablet, 2 tablets (1,000 mg total) by Per G Tube route 5 (five) times daily., Disp: 300 tablet, Rfl: 11    calcium carbonate 500 mg/5 mL (1,250 mg/5 mL), 10 mLs (1,000 mg total) by Per G Tube route 4 (four) times daily with meals and nightly., Disp: 473 mL, Rfl: 12    calcium carbonate 500 mg/5 mL (1,250 mg/5 mL), 10 mLs (1,000 mg total) by Per G Tube route 4 (four) times daily with meals and nightly., Disp: 1200 mL, Rfl: 12    esomeprazole (NEXIUM) 40 MG capsule, 40 mg before breakfast., Disp: , Rfl:     famotidine (PEPCID) 40 mg/5 mL (8 mg/mL) suspension, 2.5 mLs (20 mg total) by Per G Tube route 2 (two) times daily., Disp: 50 mL, Rfl: 11    ibuprofen (ADVIL,MOTRIN) 200 MG tablet, Take 200 mg by mouth every 6 (six) hours as needed for Pain., Disp: , Rfl:     insulin aspart U-100 (NOVOLOG FLEXPEN U-100 INSULIN) 100 unit/mL (3 mL) InPn pen, Inject 0-10 Units into the skin as needed; Add correction scale if needed while on TF.  Blood sugar 180-230 add 2 units, 231-280 +4 units, 281-330 +6 units, 331-380 +8 units, >380 +10 units.  MDD 40 units, Disp: 12 mL, Rfl: 0    lactose-reduced food with fibr (JEVITY 1.5 TRISHA) 0.06 gram-1.5 kcal/mL Liqd, 6 cartons per day via peg.  Flush 60ml before and after each bolus, Disp: 180 each, Rfl: 11    levoFLOXacin (LEVAQUIN) 750 MG tablet, Take 750 mg by mouth. for seven days, Disp: , Rfl:     levothyroxine (SYNTHROID) 100 MCG tablet, 1 tablet (100 mcg total) by Per G Tube route before breakfast., Disp: 30 tablet, Rfl: 11    losartan (COZAAR) 100 MG tablet, Take 0.5 tablets (50 mg total) by mouth once daily. (Patient taking differently: Take 50 mg by mouth every evening.), Disp: 45 tablet, Rfl: 3    metFORMIN (GLUCOPHAGE) 500 MG tablet, Take 1 tablet (500 mg total) by mouth 2 (two) times daily with meals., Disp: 60 tablet, Rfl: 3    promethazine (PHENERGAN) 25 MG tablet, Take 1 tablet (25 mg total) by mouth every 4 to 6 hours as needed for Nausea., Disp: 30 tablet, Rfl: 2     traZODone (DESYREL) 50 MG tablet, TAKE 1 TABLET BY MOUTH NIGHTLY AS NEEDED FOR INSOMNIA., Disp: 90 tablet, Rfl: 1    zolpidem (AMBIEN) 5 MG Tab, 1 tablet (5 mg total) by Per G Tube route nightly as needed (difficult with sleep)., Disp: 30 tablet, Rfl: 0  No current facility-administered medications for this visit.    Facility-Administered Medications Ordered in Other Visits:     heparin, porcine (PF) 100 unit/mL injection flush 500 Units, 500 Units, Intravenous, PRN, Venu Tamez MD, 500 Units at 02/27/23 1230    sodium chloride 0.9% flush 10 mL, 10 mL, Intravenous, PRN, Venu Tamez MD, 10 mL at 02/27/23 1230    All medications and past history have been reviewed.    OP HEAD NECK CISPLATIN WEEKLY + RADIOTHERAPY      Treatment Goal:   Curative      Status:   Active      Start Date:   1/23/2023      End Date:   3/6/2023 (Planned)      Provider:   Venu Tamez MD      Chemotherapy:   CISplatin (Platinol) 40 mg/m2 = 66 mg in sodium chloride 0.9% 631 mL chemo infusion, 40 mg/m2 = 66 mg, Intravenous, Clinic/HOD 1 time, 6 of 7 cycles    Administration: 66 mg (1/23/2023), 66 mg (2/13/2023), 66 mg (2/6/2023), 66 mg (2/20/2023), 66 mg (2/27/2023)      Objective:      Wt Readings from Last 20 Encounters:   02/27/23 57.5 kg (126 lb 11.2 oz)   02/27/23 57.5 kg (126 lb 11.2 oz)   02/22/23 58.1 kg (128 lb)   02/20/23 58.3 kg (128 lb 9.6 oz)   02/13/23 58.5 kg (129 lb)   02/13/23 58.5 kg (129 lb)   02/07/23 60.1 kg (132 lb 7.9 oz)   02/06/23 56.8 kg (125 lb 3.2 oz)   02/06/23 56.8 kg (125 lb 3.2 oz)   01/31/23 58.7 kg (129 lb 6.6 oz)   01/30/23 56.4 kg (124 lb 7.2 oz)   01/30/23 56.4 kg (124 lb 7.2 oz)   01/27/23 58.5 kg (129 lb)   01/26/23 59 kg (130 lb 1.6 oz)   01/23/23 58.4 kg (128 lb 12 oz)   01/23/23 58.4 kg (128 lb 12 oz)   01/18/23 59.4 kg (130 lb 15.3 oz)   01/13/23 59.4 kg (130 lb 14.4 oz)   01/12/23 57.6 kg (127 lb)   01/09/23 57.7 kg (127 lb 3.3 oz)       Last Labs:  Last Labs:  Glucose   Date  Value Ref Range Status   02/24/2023 101 70 - 110 mg/dL Final   02/17/2023 155 (H) 70 - 110 mg/dL Final     BUN   Date Value Ref Range Status   02/24/2023 22 8 - 23 mg/dL Final   02/17/2023 23 8 - 23 mg/dL Final     Creatinine   Date Value Ref Range Status   02/24/2023 0.8 0.5 - 1.4 mg/dL Final   02/17/2023 1.0 0.5 - 1.4 mg/dL Final     Sodium   Date Value Ref Range Status   02/24/2023 137 136 - 145 mmol/L Final   02/17/2023 132 (L) 136 - 145 mmol/L Final     Potassium   Date Value Ref Range Status   02/24/2023 4.1 3.5 - 5.1 mmol/L Final   02/17/2023 4.2 3.5 - 5.1 mmol/L Final     Phosphorus   Date Value Ref Range Status   11/24/2022 2.4 (L) 2.7 - 4.5 mg/dL Final   11/23/2022 3.6 2.7 - 4.5 mg/dL Final     Calcium   Date Value Ref Range Status   02/24/2023 8.6 (L) 8.7 - 10.5 mg/dL Final   02/17/2023 8.5 (L) 8.7 - 10.5 mg/dL Final     Prealbumin   Date Value Ref Range Status   09/03/2022 19 (L) 20.0 - 43.0 mg/dL Final     Total Protein   Date Value Ref Range Status   02/24/2023 7.0 6.0 - 8.4 g/dL Final   02/17/2023 7.3 6.0 - 8.4 g/dL Final     Cholesterol   Date Value Ref Range Status   02/14/2022 144 120 - 199 mg/dL Final     Comment:     The National Cholesterol Education Program (NCEP) has set the  following guidelines (reference ranges) for Cholesterol:  Optimal.....................<200 mg/dL  Borderline High.............200-239 mg/dL  High........................> or = 240 mg/dL     11/18/2020 174 120 - 199 mg/dL Final     Comment:     The National Cholesterol Education Program (NCEP) has set the  following guidelines (reference ranges) for Cholesterol:  Optimal.....................<200 mg/dL  Borderline High.............200-239 mg/dL  High........................> or = 240 mg/dL       Hemoglobin A1C   Date Value Ref Range Status   11/22/2022 5.8 4.5 - 6.2 % Final     Comment:     According to ADA guidelines, hemoglobin A1C <7.0% represents  optimal control in non-pregnant diabetic patients.  Different  metrics  may apply to specific populations.   Standards of Medical Care in Diabetes - 2016.    For the purpose of screening for the presence of diabetes:  <5.7%     Consistent with the absence of diabetes  5.7-6.4%  Consistent with increasing risk for diabetes   (prediabetes)  >or=6.5%  Consistent with diabetes    Currently no consensus exists for use of hemoglobin A1C  for diagnosis of diabetes for children.     11/09/2022 5.7 (H) 4.0 - 5.6 % Final     Comment:     ADA Screening Guidelines:  5.7-6.4%  Consistent with prediabetes  >or=6.5%  Consistent with diabetes    High levels of fetal hemoglobin interfere with the HbA1C  assay. Heterozygous hemoglobin variants (HbS, HgC, etc)do  not significantly interfere with this assay.   However, presence of multiple variants may affect accuracy.       Hemoglobin   Date Value Ref Range Status   02/24/2023 10.0 (L) 14.0 - 18.0 g/dL Final   02/17/2023 10.9 (L) 14.0 - 18.0 g/dL Final     POC Hematocrit   Date Value Ref Range Status   11/09/2022 25 (L) 36 - 54 %PCV Final   11/09/2022 24 (L) 36 - 54 %PCV Final     Hematocrit   Date Value Ref Range Status   02/24/2023 29.4 (L) 40.0 - 54.0 % Final   02/17/2023 32.0 (L) 40.0 - 54.0 % Final     Iron   Date Value Ref Range Status   11/25/2022 30 (L) 45 - 160 ug/dL Final   09/04/2022 58 45 - 160 ug/dL Final     No components found for: FROLATE  Vit D, 25-Hydroxy   Date Value Ref Range Status   02/14/2022 37 30 - 96 ng/mL Final     Comment:     Vitamin D deficiency.........<10 ng/mL                              Vitamin D insufficiency......10-29 ng/mL       Vitamin D sufficiency........> or equal to 30 ng/mL  Vitamin D toxicity............>100 ng/mL     11/18/2020 38 30 - 96 ng/mL Final     Comment:     Vitamin D deficiency.........<10 ng/mL                              Vitamin D insufficiency......10-29 ng/mL       Vitamin D sufficiency........> or equal to 30 ng/mL  Vitamin D toxicity............>100 ng/mL       WBC   Date Value Ref Range  Status   02/24/2023 3.44 (L) 3.90 - 12.70 K/uL Final   02/17/2023 5.60 3.90 - 12.70 K/uL Final       Assessment:     Nutrition/Diet History     Patient Reported Diet/Restrictions/Preferences: sips of thin liquids  Food Allergies: NKFA  Factors Affecting Nutritional Intake: s/p laryngectomy, glossectomy    Estimated/Assessed Needs     Weight Used For Calorie Calculations: 62 kg (137 lb)  Energy Calorie Requirements (kcal): 9809-2622 kcal/day   Energy Need Method: 30-35 Kcal/kg  Protein Requirements:  g/day   Protein Need Method: 1.5-2.0 g/kg  Fluid Requirements: 2000 ml/day  Estimated Fluid Requirement Method: 1ml/kcal      Nutrition Support  Current EN: Jevity 1.5- 6 cartons per day with 60ml FWF before and after each bolus. Provides 2130 calories, 90g protein and 1800ml FW. Recommend extra 60ml FWF TID to meet estimated fluid needs.    Evaluation of Received Nutrient/Fluid Intake     Calorie Intake: meeting needs  Protein Intake: meeting needs  Fluid Intake: meeting needs  Tolerance: tolerating  % Intake of Estimated Energy Needs: 100 % via peg      Nutrition Diagnosis Related to (Etiology) As Evidenced By (Signs/Symptoms)   Unintended weight loss Physiological causes increasing nutrient needs BoT cancer, Estimated intake of protein insufficient to meet requirements     RD Notes  Mr. Cyrus Batres is a 71y/o male with 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. Pt is schedule for glossectomy surgery for his base of tongue cancer in 1 month. He is currently NPO and recently switch to Jevity 1.5 and is tolerating 6 cartons per day without associated N/V/D, gas or bloating. He does have chronic diarrhea not associated with feeding times. He present today with questions regarding how he can increase his nutrition to prepare for surgery next month. He reports he would like to switch to [employer] to promote regular BMs and help control BG. He reports recently weight gain of 7-8#  since increasing his TF to 6 cartons per day. CW: 137#    1/23/22: RD met with Mr Batres infusion today for nutrition follow up. Pt will be starting new treatment with cisplatin and concurrent XRT s/p extensive resection last month. Pt denies having any current problems with peg feedings. He has good knowledge of nutrition related side effects of treatment. Denies N/V/D/C. BMs normal. CW: 128#    1/30: Pt reports he continues to bolus 5 cartons of Jevity per day without s/s of intolerance. He is working himself up to 6 cartons per day due to increased needs during CCXRT. He denies N/V/D/C. He reports good hydration via peg. Noted 43 weight loss in 1 week but pt reports he was wearing a heavy coat last week when his weight was taken. CW: 124#    2/6: Pt reports good tolerance to treatment so far. Denies any N/V/D/C, gas or bloating with EN. Continues to work himself up to 6 cartons per day with extra fluids via peg. He denies having any nutrition related questions or concerns at the current time. CW: 125#    2/13: RD met with Mr Batres for nutrition follow up today. Pt is starting cycle 3 of treatment today and denies any N/V/D/C, gas, bloating. He denies having any problems with his peg. He reports he was cleared by ST and his surgeon  to take sips of liquids including smoothies. He denies having any nutrition related questions or concerns at the current time. CW: 129#    2/27: Pt reports good tolerance to his treatment but has been having some nausea over the last week. He continues to use his peg for majority of intake but has started sipping on juice and thin smoothies with ST. He denies having any nutrition related questions or concerns at the current time. CW: 126#    Nutrition Intervention:      Nutrition Intervention Enteral Nutrition  Composition   Goals/Expected Outcomes Continue EN at 6 cartons per day to meet estimated nutritional needs and promote regular BMs   Progress Progressing towards goal      Nutrition Intervention Fluid-modified diet   Goals/Expected Outcomes Continue aggressive hydration via peg to prevent dehydration during therapy   Progress Initial     Plan  Continue EN at goal rate. Advance to 6 cartons per day as tolerated to prevent unintentional weight loss  Continue aggressive hydration via peg to prevent dehydration  Continue oral intake with SLP guidance  Take antiemetics as prescribed  Provided RD contact info. Encouraged pt to contact RD with questions or concerns    Monitoring/Evaluation:     Monitor: TF tolerance, weight, BMs    Next Visit: f/u weekly or as needed      I have explained and the patient understands all of  the current recommendation(s). I have answered all of their questions to the best of my ability and to their complete satisfaction.   The patient is to continue with the current management plan.    Electronically signed by: Barbara Zayas MBA, RDN, LDN

## 2023-02-27 NOTE — PLAN OF CARE
Problem: Fatigue  Goal: Improved Activity Tolerance  Outcome: Ongoing, Progressing  Intervention: Promote Improved Energy  Flowsheets (Taken 2/27/2023 3681)  Fatigue Management:   fatigue-related activity identified   frequent rest breaks encouraged   paced activity encouraged  Sleep/Rest Enhancement:   regular sleep/rest pattern promoted   relaxation techniques promoted  Activity Management: Ambulated -L4

## 2023-02-28 DIAGNOSIS — R11.0 CHEMOTHERAPY-INDUCED NAUSEA: ICD-10-CM

## 2023-02-28 DIAGNOSIS — T45.1X5A CHEMOTHERAPY-INDUCED NAUSEA: ICD-10-CM

## 2023-02-28 RX ORDER — PROMETHAZINE HYDROCHLORIDE 25 MG/1
25 TABLET ORAL
Qty: 30 TABLET | Refills: 2 | Status: SHIPPED | OUTPATIENT
Start: 2023-02-28 | End: 2023-08-01

## 2023-03-01 ENCOUNTER — DOCUMENT SCAN (OUTPATIENT)
Dept: HOME HEALTH SERVICES | Facility: HOSPITAL | Age: 73
End: 2023-03-01
Payer: MEDICARE

## 2023-03-03 ENCOUNTER — PATIENT MESSAGE (OUTPATIENT)
Dept: HEMATOLOGY/ONCOLOGY | Facility: CLINIC | Age: 73
End: 2023-03-03

## 2023-03-03 ENCOUNTER — DOCUMENT SCAN (OUTPATIENT)
Dept: HOME HEALTH SERVICES | Facility: HOSPITAL | Age: 73
End: 2023-03-03
Payer: MEDICARE

## 2023-03-03 ENCOUNTER — OUTPATIENT CASE MANAGEMENT (OUTPATIENT)
Dept: ADMINISTRATIVE | Facility: OTHER | Age: 73
End: 2023-03-03
Payer: MEDICARE

## 2023-03-03 ENCOUNTER — LAB VISIT (OUTPATIENT)
Dept: LAB | Facility: HOSPITAL | Age: 73
End: 2023-03-03
Attending: NURSE PRACTITIONER
Payer: MEDICARE

## 2023-03-03 DIAGNOSIS — C01 MALIGNANT NEOPLASM OF BASE OF TONGUE: ICD-10-CM

## 2023-03-03 LAB
ALBUMIN SERPL BCP-MCNC: 4.1 G/DL (ref 3.5–5.2)
ALP SERPL-CCNC: 288 U/L (ref 55–135)
ALT SERPL W/O P-5'-P-CCNC: 60 U/L (ref 10–44)
ANION GAP SERPL CALC-SCNC: 8 MMOL/L (ref 8–16)
AST SERPL-CCNC: 42 U/L (ref 10–40)
BASOPHILS # BLD AUTO: 0.01 K/UL (ref 0–0.2)
BASOPHILS NFR BLD: 0.3 % (ref 0–1.9)
BILIRUB SERPL-MCNC: 1 MG/DL (ref 0.1–1)
BUN SERPL-MCNC: 19 MG/DL (ref 8–23)
CALCIUM SERPL-MCNC: 7.6 MG/DL (ref 8.7–10.5)
CHLORIDE SERPL-SCNC: 100 MMOL/L (ref 95–110)
CO2 SERPL-SCNC: 26 MMOL/L (ref 23–29)
CREAT SERPL-MCNC: 0.9 MG/DL (ref 0.5–1.4)
DIFFERENTIAL METHOD: ABNORMAL
EOSINOPHIL # BLD AUTO: 0 K/UL (ref 0–0.5)
EOSINOPHIL NFR BLD: 0.6 % (ref 0–8)
ERYTHROCYTE [DISTWIDTH] IN BLOOD BY AUTOMATED COUNT: 14.3 % (ref 11.5–14.5)
EST. GFR  (NO RACE VARIABLE): >60 ML/MIN/1.73 M^2
GLUCOSE SERPL-MCNC: 107 MG/DL (ref 70–110)
HCT VFR BLD AUTO: 28.5 % (ref 40–54)
HGB BLD-MCNC: 10 G/DL (ref 14–18)
IMM GRANULOCYTES # BLD AUTO: 0.01 K/UL (ref 0–0.04)
IMM GRANULOCYTES NFR BLD AUTO: 0.3 % (ref 0–0.5)
LYMPHOCYTES # BLD AUTO: 0.7 K/UL (ref 1–4.8)
LYMPHOCYTES NFR BLD: 21.1 % (ref 18–48)
MCH RBC QN AUTO: 32.7 PG (ref 27–31)
MCHC RBC AUTO-ENTMCNC: 35.1 G/DL (ref 32–36)
MCV RBC AUTO: 93 FL (ref 82–98)
MONOCYTES # BLD AUTO: 0.2 K/UL (ref 0.3–1)
MONOCYTES NFR BLD: 4.9 % (ref 4–15)
NEUTROPHILS # BLD AUTO: 2.2 K/UL (ref 1.8–7.7)
NEUTROPHILS NFR BLD: 72.8 % (ref 38–73)
NRBC BLD-RTO: 0 /100 WBC
PLATELET # BLD AUTO: 141 K/UL (ref 150–450)
PMV BLD AUTO: 10 FL (ref 9.2–12.9)
POTASSIUM SERPL-SCNC: 4.3 MMOL/L (ref 3.5–5.1)
PROT SERPL-MCNC: 7.2 G/DL (ref 6–8.4)
RBC # BLD AUTO: 3.06 M/UL (ref 4.6–6.2)
SODIUM SERPL-SCNC: 134 MMOL/L (ref 136–145)
WBC # BLD AUTO: 3.08 K/UL (ref 3.9–12.7)

## 2023-03-03 PROCEDURE — 80053 COMPREHEN METABOLIC PANEL: CPT | Performed by: NURSE PRACTITIONER

## 2023-03-03 PROCEDURE — 36415 COLL VENOUS BLD VENIPUNCTURE: CPT | Performed by: NURSE PRACTITIONER

## 2023-03-03 PROCEDURE — 85025 COMPLETE CBC W/AUTO DIFF WBC: CPT | Performed by: NURSE PRACTITIONER

## 2023-03-03 RX ORDER — HEPARIN 100 UNIT/ML
500 SYRINGE INTRAVENOUS
Status: CANCELLED | OUTPATIENT
Start: 2023-03-06

## 2023-03-03 RX ORDER — SODIUM CHLORIDE 0.9 % (FLUSH) 0.9 %
10 SYRINGE (ML) INJECTION
Status: CANCELLED | OUTPATIENT
Start: 2023-03-06

## 2023-03-03 NOTE — PROGRESS NOTES
Outpatient Care Management  Plan of Care Follow Up Visit    Patient: Cyrus Batres Jr.  MRN: [id number]  Date of Service: 03/03/2023  Completed by: Eugenia Agudelo RN  Referral Date: 09/02/2022    No chief complaint on file.      Brief Summary:   Spoke to Mrs. Batres regarding Mr. Batres's current status.  states that  will receive his last  radiation treatment on 03/08/23. His last chemo treatment will be 03/13/23. He is taking calcium four times a day but his calcium level is low. Oncology is going to give him calcium IV x2  next week.  He  is having some nausea. He is taking zofran as needed. Mrs. Batres states that Mr. Batres able to tolerate 4- 5 cartons of Jevity. He is sleeping more since he is not getting his 6 cartons of jevity and with the radiation/chemo.  Speech therapy is working with him two times a week. He is drinking water with a nosey cup from [employer].   Next Steps: Follow up in three weeks as per his request Possible closure.

## 2023-03-03 NOTE — PROGRESS NOTES
[facility]/On[facility]  PROGRESS NOTE -  Follow-up Visit      Subjective:       Patient ID:   NAME: Cyrus Batres Jr. : 1950     72 y.o. 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  The patient is here today to go over the results of the recently ordered labs, tests and studies. He is here by himself.    He is getting chemotherapy again today and continues with the XRT through this Thursday.   He seems to be tolerating the regimen fairly well with no new complaints.        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 He had his portacath removed on  and replaced on 2023 by Dr Le    He is breathing ok. No HA's or CP; no pain at this time        Discussed covid19 and he has been vaccinated      ROS:   GEN: normal without any fever, night sweats or weight loss; doing well with tube feeds   HEENT: normal with no HA's, sore throat, stiff neck, changes in vision  CV: normal with no CP, SOB, PND, GRIFFIN or orthopnea  PULM: normal with no SOB, cough, hemoptysis, sputum or pleuritic pain  GI: no current N/V  ; has peg tube;    : normal with no hematuria, dysuria  BREAST: normal with no mass, discharge, pain  SKIN: normal with no rash, erythema, bruising, or swelling     Pain Scale:  0    Allergies:  Review of patient's allergies indicates:   Allergen Reactions    Lovastatin  "Itching    Pollen extracts     Lovastatin Rash     Not confirmed but pt skeptical       Medications:    Current Outpatient Medications:     acetaminophen (TYLENOL) 325 MG tablet, Take 325 mg by mouth every 6 (six) hours as needed for Pain., Disp: , Rfl:     BD ULTRA-FINE YULISSA PEN NEEDLE 32 gauge x 5/32" Ndle, To be used with Novolog pen up to 4x a day, Disp: 100 each, Rfl: 3    calcitrioL (ROCALTROL) 1 mcg/mL solution, Take 0.25 mLs (0.25 mcg total) by Per G Tube route once daily., Disp: 15 mL, Rfl: 12    calcium carbonate (TUMS) 200 mg calcium (500 mg) chewable tablet, 2 tablets (1,000 mg total) by Per G Tube route 5 (five) times daily., Disp: 300 tablet, Rfl: 11    calcium carbonate 500 mg/5 mL (1,250 mg/5 mL), 10 mLs (1,000 mg total) by Per G Tube route 4 (four) times daily with meals and nightly., Disp: 473 mL, Rfl: 12    calcium carbonate 500 mg/5 mL (1,250 mg/5 mL), 10 mLs (1,000 mg total) by Per G Tube route 4 (four) times daily with meals and nightly., Disp: 1200 mL, Rfl: 12    esomeprazole (NEXIUM) 40 MG capsule, 40 mg before breakfast., Disp: , Rfl:     famotidine (PEPCID) 40 mg/5 mL (8 mg/mL) suspension, 2.5 mLs (20 mg total) by Per G Tube route 2 (two) times daily., Disp: 50 mL, Rfl: 11    ibuprofen (ADVIL,MOTRIN) 200 MG tablet, Take 200 mg by mouth every 6 (six) hours as needed for Pain., Disp: , Rfl:     insulin aspart U-100 (NOVOLOG FLEXPEN U-100 INSULIN) 100 unit/mL (3 mL) InPn pen, Inject 0-10 Units into the skin as needed; Add correction scale if needed while on TF.  Blood sugar 180-230 add 2 units, 231-280 +4 units, 281-330 +6 units, 331-380 +8 units, >380 +10 units.  MDD 40 units, Disp: 12 mL, Rfl: 0    lactose-reduced food with fibr (JEVITY 1.5 TRISHA) 0.06 gram-1.5 kcal/mL Liqd, 6 cartons per day via peg.  Flush 60ml before and after each bolus, Disp: 180 each, Rfl: 11    levoFLOXacin (LEVAQUIN) 750 MG tablet, Take 750 mg by mouth. for seven days, Disp: , Rfl:     levothyroxine (SYNTHROID) 100 MCG " tablet, 1 tablet (100 mcg total) by Per G Tube route before breakfast., Disp: 30 tablet, Rfl: 11    losartan (COZAAR) 100 MG tablet, Take 0.5 tablets (50 mg total) by mouth once daily. (Patient taking differently: Take 50 mg by mouth every evening.), Disp: 45 tablet, Rfl: 3    metFORMIN (GLUCOPHAGE) 500 MG tablet, Take 1 tablet (500 mg total) by mouth 2 (two) times daily with meals., Disp: 60 tablet, Rfl: 3    promethazine (PHENERGAN) 25 MG tablet, Take 1 tablet (25 mg total) by mouth every 4 to 6 hours as needed for Nausea., Disp: 30 tablet, Rfl: 2    traZODone (DESYREL) 50 MG tablet, TAKE 1 TABLET BY MOUTH NIGHTLY AS NEEDED FOR INSOMNIA., Disp: 90 tablet, Rfl: 1    zolpidem (AMBIEN) 5 MG Tab, 1 tablet (5 mg total) by Per G Tube route nightly as needed (difficult with sleep)., Disp: 30 tablet, Rfl: 0  No current facility-administered medications for this visit.    Facility-Administered Medications Ordered in Other Visits:     CISplatin (Platinol) 40 mg/m2 = 66 mg in sodium chloride 0.9% 631 mL chemo infusion, 40 mg/m2 (Treatment Plan Recorded), Intravenous, 1 time in Clinic/HOD, Venu Tamez MD, Last Rate: 631 mL/hr at 03/06/23 1110, 66 mg at 03/06/23 1110    heparin, porcine (PF) 100 unit/mL injection flush 500 Units, 500 Units, Intravenous, PRN, Venu Tamez MD    sodium chloride 0.9% bolus 1,000 mL 1,000 mL, 1,000 mL, Intravenous, 1 time in Clinic/HOD, Venu Tamez MD, Last Rate: 500 mL/hr at 03/06/23 1114, 1,000 mL at 03/06/23 1114    sodium chloride 0.9% flush 10 mL, 10 mL, Intravenous, PRN, Venu Tamez MD    PMHx/PSHx Updates:  See patient's last visit with me on 2/22/2023  See H&P on 9/23/2022        Pathology:   Cancer Staging   Larynx cancer  Staging form: Larynx - Glottis, AJCC 8th Edition  - Clinical stage from 8/6/2020: Stage II (cT2, cN0, cM0) - Signed by Fariha Muñoz NP on 8/6/2020  - Pathologic stage from 1/20/2022: Stage LOU (pT4a, pN0, cM0) - Signed by Fariha SWEENEY  "Wanda, NP on 1/20/2022  Staging form: Pharynx - P16 Negative Oropharynx, AJCC 8th Edition  - Clinical: Stage II (rcT2, cN0, cM0) - Signed by Matheus Portillo Jr., MD on 5/30/2022    Malignant neoplasm of base of tongue  Staging form: Pharynx - P16 Negative Oropharynx, AJCC 8th Edition  - Clinical stage from 5/20/2022: Stage II (cT2, cN0, cM0, p16-) - Signed by Saúl Kessler MD on 7/7/2022    Dissection 11/9/2022:    Final Pathologic Diagnosis 1. "left submandibular lymph node", dissection:       - Three lymph nodes, negative for carcinoma (0/3)   2. 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       - Tumor size: 4.5 cm       - Resection margins: left superior pharyngeal margin focally involved;   all other margins are negative for carcinoma       - Left internal jugular vein: free of tumor       - One lymph node, negative for carcinoma (0/1)   Note: P16 immunostain is negative     Tumor Site       Oropharynx  involving Base of tongue       Tumor Laterality       Midline       Tumor Size       Greatest Dimension (Centimeters) 4.5 cm         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)       Histologic Grade       G2 to G3: Moderate to Poorly differentiated       Lymphovascular Invasion       Not identified       Perineural Invasion       Not identified     MARGINS      Margins       Involved by invasive tumor     Margin(s)   Involved by Invasive Tumor:      left superior pharyngeal margin; all other   margins are free of tumor     LYMPH NODES    Regional Lymph Node Status:    :     Regional Lymph Node   Status:      All regional lymph nodes negative for tumor      pT Category:               pT3   pN Category:               pN0      Base of Tongue Biopsy  4/27/2022:  Final Pathologic Diagnosis BIOPSY OF BASE OF THE TONGUE:   THE INFILTRATING POORLY DIFFERENTIATED SQUAMOUS CELL CARCINOMA   THE " TUMOR IS P16 NEGATIVE.  THE POSITIVE AND NEGATIVE CONTROLS STAINED   APPROPRIATELY      Larynx resection/thyroidectomy 1/6/2022:     Final Pathologic Diagnosis 1. Lymph nodes, left neck levels 2,  3 and 4, dissection:   - Nineteen lymph nodes, all  negative  for metastatic carcinoma (0/19)   2. Lymph nodes, right neck levels 2,  3 and 4, dissection:   - Twenty-eight lymph nodes, all  negative  for metastatic carcinoma (0/28)   3. Possible parathyroid gland, excision:   - Benign parathyroid gland tissue (0.003 g)   4. 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 Soft tissue, posterior tracheal margin, excision:   - Benign, focally reactive respiratory mucosa with submucosal acute   inflammation,  negative  for dysplasia or malignancy   6. Soft tissue, anterior tracheal margin, excision:   - Benign respiratory mucosa with subepithelial cartilage,  negative  for   dysplasia or malignancy   7. Soft tissue, posterior tracheal margin #2, excision:   - Benign, focally reactive respiratory mucosa with submucosal acute   inflammation,  negative  for dysplasia or malignancy   8. Soft tissue, anterior tracheal margin #2, excision:   - Benign, reactive focally ulcerated respiratory mucosa with submucosal acute   inflammation,  negative  for dysplasia or malignancy             Laryngoscope 8/4/2020: (with Dr Noel):  Final Pathologic Diagnosis 1.  Left true vocal fold, biopsy:       -  Invasive moderately differentiated squamous cell carcinoma,   keratinizing type  "  2.  Left true vocal fold, biopsy:       -  Invasive moderately differentiated squamous cell carcinoma,   keratinizing type             Laryngoscope 3/17/2020 (with Dr Xiao):  Final Pathologic Diagnosis 1.  BIOPSY OF LEFT TRUE VOCAL CORD:   SEVERELY DYSPLASTIC APPEARING SQUAMOUS MUCOSA   INVASIVE CARCINOMA IS NOT DOCUMENTED   ON THE OTHER HAND, THESE FRAGMENTS ARE NODUL AND WITHOUT SUBMUCOSA FOR   EVALUATION; IT IS POSSIBLE THAT THEY REFLECT INVASIVE SQUAMOUS CARCINOMA   2.  BIOPSY OF LEFT ANTERIOR COMMISSURE:   MODERATE DYSPLASIA   NO INVASIVE CARCINOMA IDENTIFIED             Objective:     Vitals:  Blood pressure 122/79, pulse 74, temperature 97.7 °F (36.5 °C), resp. rate 17, height 5' 6" (1.676 m), weight 58.6 kg (129 lb 3.2 oz), SpO2 100 %.    Physical Examination:   GEN: no apparent distress, comfortable; AAOx3  HEAD: atraumatic and normocephalic  EYES: no pallor, no icterus, PERRLA  ENT: OMM, no pharyngeal erythema, external ears WNL; no nasal discharge; no thrush; s/p laryngectomy with flap since healed well; trach   NECK: no masses, thyroid normal, trachea midline, no LAD/LN's, supple; post-op dissection healed well;    CV: RRR with no murmur; normal pulse; normal S1 and S2; no pedal edema; portacath   CHEST: Normal respiratory effort; CTAB; normal breath sounds; no wheeze or crackles  ABDOM: nontender and nondistended; soft; normal bowel sounds; no rebound/guarding; peg tube  MUSC/Skeletal: ROM normal; no crepitus; joints normal; no deformities or arthropathy  EXTREM: no clubbing, cyanosis, inflammation or swelling  SKIN: no rashes, lesions, ulcers, petechiae or subcutaneous nodules  : no mahan  NEURO: grossly intact; motor/sensory WNL; AAOx3; no tremors  PSYCH: normal mood, affect and behavior  LYMPH: normal cervical, supraclavicular, axillary and groin LN's          Labs:     Lab Results   Component Value Date    WBC 3.08 (L) 03/03/2023    HGB 10.0 (L) 03/03/2023    HCT 28.5 (L) 03/03/2023    MCV 93 " 03/03/2023     (L) 03/03/2023     CMP  Sodium   Date Value Ref Range Status   03/03/2023 134 (L) 136 - 145 mmol/L Final     Potassium   Date Value Ref Range Status   03/03/2023 4.3 3.5 - 5.1 mmol/L Final     Chloride   Date Value Ref Range Status   03/03/2023 100 95 - 110 mmol/L Final     CO2   Date Value Ref Range Status   03/03/2023 26 23 - 29 mmol/L Final     Glucose   Date Value Ref Range Status   03/03/2023 107 70 - 110 mg/dL Final     BUN   Date Value Ref Range Status   03/03/2023 19 8 - 23 mg/dL Final     Creatinine   Date Value Ref Range Status   03/03/2023 0.9 0.5 - 1.4 mg/dL Final     Calcium   Date Value Ref Range Status   03/03/2023 7.6 (L) 8.7 - 10.5 mg/dL Final     Total Protein   Date Value Ref Range Status   03/03/2023 7.2 6.0 - 8.4 g/dL Final     Albumin   Date Value Ref Range Status   03/03/2023 4.1 3.5 - 5.2 g/dL Final   11/18/2020 3.7 3.6 - 5.1 g/dL Final     Comment:     For additional information, please refer to   http://education.marinanow/faq/PDS466 (This link is   being provided for informational/ educational purposes only.)  This test was developed and its analytical performance   characteristics have been determined by [facility]. It has not been cleared or approved by the   [place] Food and Drug Administration. This assay has been validated   pursuant to the CLIA regulations and is used for clinical   purposes.  @ Test Performed By:  [employer]  Yo Cortes M.D.,   [address] [place] [id number]  CLIA  [id number]       Total Bilirubin   Date Value Ref Range Status   03/03/2023 1.0 0.1 - 1.0 mg/dL Final     Comment:     For infants and newborns, interpretation of results should be based  on gestational age, weight and in agreement with clinical  observations.    Premature Infant recommended reference ranges:  Up to 24 hours.............<8.0 mg/dL  Up to 48 hours............<12.0  mg/dL  3-5 days..................<15.0 mg/dL  6-29 days.................<15.0 mg/dL       Alkaline Phosphatase   Date Value Ref Range Status   03/03/2023 288 (H) 55 - 135 U/L Final     AST   Date Value Ref Range Status   03/03/2023 42 (H) 10 - 40 U/L Final     ALT   Date Value Ref Range Status   03/03/2023 60 (H) 10 - 44 U/L Final     Anion Gap   Date Value Ref Range Status   03/03/2023 8 8 - 16 mmol/L Final     eGFR if    Date Value Ref Range Status   07/29/2022 >60.0 >60 mL/min/1.73 m^2 Final     eGFR if non    Date Value Ref Range Status   07/29/2022 >60.0 >60 mL/min/1.73 m^2 Final     Comment:     Calculation used to obtain the estimated glomerular filtration  rate (eGFR) is the CKD-EPI equation.          Lab Results   Component Value Date    IRON 30 (L) 11/25/2022    TRANSFERRIN 114 (L) 11/25/2022    TIBC 169 (L) 11/25/2022    FESATURATED 18 (L) 11/25/2022            Radiology/Diagnostic Studies:      CTA Neck    Result Date: 9/20/2022  EXAMINATION: CTA NECK CLINICAL HISTORY: Head/neck cancer, monitor;Malignant neoplasm of base of tongue TECHNIQUE: CT angiogram was performed from the level of the scott to the EAC following the IV administration of 75mL of Omnipaque 350.   Sagittal and coronal reconstructions and maximum intensity projection reconstructions were performed. Arterial stenosis percentages are based on NASCET measurement criteria. COMPARISON: CTA neck dated 05/20/2022 FINDINGS: Aortic arch and great vessels: There is a conventional left-sided 3 vessel arch.  The aortic arch is widely patent.  There is stable soft and calcified plaque in the proximal left subclavian artery with a similar appearance the prior study and possibly within radiation field but without critical stenosis or occlusion.  The right subclavian artery is patent.  The brachiocephalic trunk and origin of the left common carotid artery are patent. Carotid arteries Right carotid artery: There is  calcified plaque scattered in the right common carotid artery without critical stenosis, occlusion or change.  There is no measurable stenosis of the internal carotid artery which is patent to the skull base. Left carotid artery: There is mild plaque scattered in the left common carotid artery without change and without critical stenosis or occlusion.  There is no measurable stenosis of the internal carotid artery. Vertebral arteries: The left vertebral artery is dominant and patent throughout its course in the neck.  The right vertebral artery is hypoplastic but patent.  There is no critical stenosis, occlusion, thrombus or dissection.  There is no change. The study extends intracranially.  There is calcified plaque in the V4 segment of the left vertebral artery resulting in a moderate to marked short segment nonocclusive stenosis.  The right vertebral artery partially terminates in a posteroinferior cerebellar artery with a short segment moderate to marked stenosis of the very distal right vertebral artery.  Some flow within the distal right vertebral artery may represent retrograde flow from the dominant left vertebral artery.  The basilar artery is patent.  The origins of the superior cerebellar and posterior cerebral artery on the right are patent.  There is fetal origin of the left posterior cerebral artery which is patent. There is plaque in the cavernous segments of both internal carotid arteries but there is no critical stenosis or large vessel occlusion of the anterior circulation vessels.  There is no large aneurysm. Other: There is a similar appearance of the included upper lungs with pleural and parenchymal changes anteriorly in the upper lobes bilaterally.  As previously discussed, timing of the contrast bolus is performed during the arterial phase for CTA neck.  Therefore, enhancement of the soft tissues is somewhat suboptimal due to this technique. There has been a large region of soft tissue  resection in the anterior mid and lower neck soft tissues with continued open defect in the upper chest and lower neck above the manubrium.  Soft tissue seen along the left posterolateral border of the trachea could represent secretions or mucus.  This was present previously. Redemonstrated is a large heterogeneously enhancing lesion centered at the level of the tongue base and floor of the mouth centrally into the left.  There is scattered calcifications.  The prior CTA demonstrated regions of central necrosis which are no longer definitely present but diffusely heterogeneous density and enhancement likely represents regions of necrosis.  This large heterogeneously enhancing soft tissue mass extends more anteriorly in the floor of the mouth on the left and measures at least 5.6 cm in greatest anterior to posterior dimension with 3.6 cm transverse dimension.  This does extend anteriorly to the medial margin of the body of the mandible on the left. There are post treatment changes with stranding in the neck fat.     1. Similar appearance of the neck vessels when compared to the prior CTA neck with mild narrowing at the origin of the left subclavian artery.  There is no critical stenosis, occlusion, thrombosis or dissection involving the cervical vertebral or carotid arteries. 2. Larger mass within the hypopharynx and tongue base extending anteriorly to the floor of the mouth on the left to the medial margin of the body of the mandible without obvious bony destruction. 3. There is nonocclusive stenosis of the distal V4 segment of the left vertebral artery without change. 4. There is no large vessel occlusion or critical stenosis of the anterior circulation. 5. There is developmental variation with fetal origin of the left posterior cerebral artery. Electronically signed by: Rex Joyner MD Date:    09/20/2022 Time:    11:31    CT Abdomen Pelvis With Contrast    Result Date: 9/1/2022  CMS MANDATED QUALITY DATA -  CT RADIATION - 436 All CT scans at this facility utilize dose modulation, iterative reconstruction, and/or weight based dosing when appropriate to reduce radiation dose to as low as reasonably achievable. Reason: abdominal pain partial history of laryngeal carcinoma TECHNIQUE: CT abdomen and pelvis with 100 mL Omnipaque 350. COMPARISON: PET/CT 5/13/2022 CT ABDOMEN: Coronary artery calcification and extremely tiny hiatal hernia incidentally noted. Visualized lung bases are clear. Liver, gallbladder, pancreas, spleen, adrenals, and kidneys are normal. Mild aortoiliac calcifications present. Gastrostomy tube tip lies in distal gastric body. Small intestines are unremarkable. Mild wall thickening affects the ascending colon with pneumatosis intestinalis diffusely affecting the ascending colon. No other free intraperitoneal gas or, and remainder of colon is normal. A normal appendix is present. The major mesenteric vascular structures are patent. No acute osseous abnormality. CT PELVIS: Prostate is slightly enlarged. Bladder is normal. No free pelvic fluid. Tiny right and small to moderate left fat-containing inguinal hernias are present. No acute osseous abnormality. IMPRESSION: 1. Pneumatosis intestinalis affecting the ascending colon with associated mild wall thickening. Etiology of this is undetermined. Potential considerations include intestinal ischemia or pneumatosis related to chemotherapy. Clinical and laboratory correlation is needed to assess significance. No portal venous gas or free intraperitoneal air however. 2. Coronary artery calcifications Electronically signed by:  Nael Hui MD  9/1/2022 6:20 PM CDT Workstation: [id number]    NM PET CT Routine Skull to Mid Thigh    Result Date: 9/27/2022  PET CT WITH IMAGE FUSION HISTORY:  restage tongue cancer RESTAGING...  HX: TONGUE CA.  DX: April 2022.  LAST CHEMO TREATMENT WAS 3WKS AGO.  EVALUATE TX RESPONSE.   ALSO HX OF LARYNGEAL CA IN AUGUST 2020.  MULTIPLE  SURGERIES: LARYNGECTOMY, THYROIDECTOMY & TRACHEOSTOMY.  RAD TX WAS COMPLETED IN NOV 2020. TECHNIQUE: Following IV administration of 11.2 mCi of F-18 labeled FDG into right antecubital fossa and a 60 minute delay, PET CT was performed from the vertex of the skull through the proximal thighs with an integrated PET CT scanner with image fusion. CT images were obtained to aid in attenuation correction and PET localization. The patient's serum glucose at the time of the exam was 136 mg/dL. COMPARISON: PET/CT 5/13/2022 FINDINGS: Poorly characterized soft tissue mass involving base of tongue approximately measures 4.3 x 2.8 cm (series 3 image 65) appearing similar to the prior exam. This reaches current max SUV of 11.8, not significantly changed from prior of 11.4. No other abnormal FDG activity. Intracranial compartment is unremarkable. Trace bilateral maxillary sinus mucosal thickening is present. Postoperative changes of laryngectomy and tracheostomy are present. Surgical clips occur throughout the neck. No definite enlarged cervical or supraclavicular lymph node, with evaluation limited by lack of IV contrast. Left subclavian port catheter tip terminates in SVC. Diffuse coronary artery calcifications are present. No enlarged mediastinal or axillary lymph nodes. No pulmonary nodule or mass. Gastrostomy tube tip lies in gastric body. Moderate aortoiliac calcifications are present. Small fat-containing left inguinal hernia incidentally noted. Mild degenerative changes affect the spine. No suspicious osseous abnormality. IMPRESSION: 1. No significant change in the FDG avid mass involving the tongue base, remaining characteristic of malignancy. 2. No new FDG avid malignancy or metastatic disease. Electronically signed by:  Nael Hui MD  9/27/2022 2:38 PM CDT Workstation: [id number]    CT Abdomen Pelvis  Without Contrast    Result Date: 9/4/2022  CMS MANDATED QUALITY DATA - CT RADIATION  436 All CT scans at this facility  utilize dose modulation, iterative reconstruction, and/or weight based dosing when appropriate to reduce radiation dose to as low as reasonably achievable. CT ABDOMEN PELVIS WITHOUT IV CONTRAST CLINICAL HISTORY: 72 years Male Follow-up pneumatosis COMPARISON: CT abdomen and pelvis September 1, 2022 FINDINGS: Imaging through the lower thorax demonstrates subsegmental atelectasis of the dependent lower lobes. Coronary artery calcification. Bone window images show no acute or aggressive osseous abnormality. Transitional lumbosacral vertebral body. On this unenhanced exam, no focal hepatic lesion. Gallbladder and biliary tree are unremarkable. Spleen appears normal. Pancreas is unremarkable. No adrenal lesion. No renal calculi or hydronephrosis. Ureters are normal in caliber. Urinary bladder is within normal limits. Gastrostomy tube is in place within the stomach. Stomach is largely collapsed. No evidence of small bowel obstruction. Pneumatosis and pericolonic abscess involving the ascending colon is redemonstrated, slightly diminished compared to prior. Mild wall thickening throughout the colon. No free fluid or free air within the abdomen or pelvis. Aortoiliac atherosclerotic calcification. No pathologically enlarged lymph nodes within the abdomen or pelvis. Bilateral fat-containing inguinal hernias, larger on the left. IMPRESSION: Pneumatosis and pericolonic gas about the ascending colon has decreased in volume compared to prior. Persistent colonic wall thickening which could indicate colitis. Coronary and aortic atherosclerosis. Gastrostomy tube is within the stomach. Bilateral inguinal hernias. Electronically signed by:  Jose De Jesus Oleary MD  9/4/2022 4:06 PM CDT Workstation: [id number]    CTA neck  5/20/2022:     IMPRESSION:  Persistent mass within the hypopharynx consistent with malignancy.  By my measurements it is larger than on April 24, 2022 with central necrosis.  Stenosis to the intracranial portion of the  left vertebral artery with possible short segment occlusion. This is similar to the previous April 2022 study.  No evidence of arterial compromise related to malignancy.     PET 5/13/2022:     IMPRESSION:  1. Postoperative changes of prior laryngectomy and lymph node resection/radiation.  2. Masslike FDG avid lesion to the left of midline at the tongue base concerning for residual/recurrent disease.  3. Adjacent confluent nodular extension along the inferior left side of the mass.  4. No FDG avid lymphadenopathy or convincing additional sites of disease in the chest, abdomen or pelvis.     CT head 5/10/2022:  FINDINGS: Comparison to multiple prior exams. There is no acute intracranial hemorrhage, with no mass effect or abnormal extra-axial fluid. Mild scattered areas of nonspecific hypoattenuation involve the deep periventricular white matter, with gray-white differentiation maintained.     There is mild generalized prominence of the cortical sulci and ventricles. The cerebellum and brainstem are unremarkable. There are carotid siphon vascular calcifications. The visualized paranasal sinuses and mastoid air cells are clear. There is no acute calvarial fracture or scalp hematoma.     IMPRESSION: No acute intracranial hemorrhage or acute calvarial fracture     CT soft neck 4/24/2022;     Oral tongue/tongue base:  At the base of the tongue on the left there are findings of a heterogeneously enhancing mass measuring 2.1 cm highly concerning for a neoplastic process.     True and false cords:  Patient status post laryngectomy which has been performed in the interim. Soft tissue stranding in the lower neck likely related to a previous flat reconstruction. No enhancement or lymphadenopathy within the lower neck.     Lymph node assessment:Negative     Surrounding soft tissues:  Negative     Vasculature:  Negative     Osseous structures:  Negative     Lung apices:  Scarring involving the lung apices bilaterally likely  related to previous radiation.     IMPRESSION:  1. Postoperative and radiation changes involving the lower neck.  2. Findings highly concerning for a developing mass at the left base of the tongue enhancing 2.1 cm region. Direct visualization in this region would be of benefit.        CT soft neck  12/24/2021  IMPRESSION:  1.  Laryngeal mass as on prior exam. Laryngeal airway narrowing has not changed.  2.  No evidence for active hemorrhage.  3.  Partially visualized patchy groundglass infiltrates in both upper lobes. Also see CT chest report     CTA Chest 12/24/2021:  IMPRESSION:     1.  No pulmonary embolism.     2.  Soft tissue stranding in the region of the strap musculature with ill-defined hypodensity measuring 2.8 x 1.4 cm in the cervical midline.  Findings concerning for infectious process or abscess. Neoplastic process could have similar imaging characteristics.     3.  Suggested erosion of the proximal right parasymphyseal aspect of the thyroid shield.  Correlated with CT soft tissue neck findings. Findings could represent osteomyelitis. Neoplastic process cannot be excluded.     4.  Hepatic steatosis.  5.  Thickening of the gastric wall. Prominence of perigastric vasculature. Small hiatal hernia.     6.  Moderate stool burden.  Correlate for constipation.        PET 12/15/2021:  IMPRESSION:     Substantial qualitative and quantitative increase of hypermetabolic FDG activity associated with the larynx in this patient with known supraglottic neoplasm.     No evidence of FDG avid metastatic disease involving neck, chest, abdomen or pelvis.     PET 8/21/2020:     Impression:     1. Intensely hypermetabolic plaque-like mass along the left true vocal cord, compatible with known laryngeal carcinoma.  2. No findings of regional metastatic disease in the neck, or distant metastatic disease.        CT Chest 8/10/2020:     IMPRESSION:     No metastatic disease within the chest.  Three-vessel coronary artery  calcification. Nondilated cardiac  chambers     CT Soft neck 7/22/2020:  IMPRESSION:  1. Slight interval increase in size of the previously described  enhancing nodule along the anterior left vocal cord.  2. No pathologic lymphadenopathy.  3. Additional and incidental findings as noted above.     CT Soft neck  3/16/2020:     Impression:     6 mm enhancing focus involving the superior aspect of the left focal cord near the anterior commisure.  Recommend direct visualization for further evaluation of laryngeal polyp     Question of 2 mm submucosal lipoma involving the midportion of the superior aspect of the left vocal cord.     Mild arteriosclerosis involving the brachiocephalic arteries and carotid arteries     Mild degenerative change of the cervical spine        I have reviewed all available lab results and radiology reports.    Assessment/Plan:   (1) 72 y.o. 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     - Patient has been under the care of Dr Khoobehi with [facility] and Dr Portillo with rad/onc.   - He was recently found to have a new primary cancer involving the base of his tongue in April 2022. He was deemed not to be a candidate for any further radiation and did not want to undergo any further surgery as this would necessitate a glossectomy procedure.   - He has been on adjuvant chemotherapy per direction of Dr Khoobehi and has had 3 cycles. His therapy course has been complicated with persistent diarrhea and N/V.      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  - cT2cN0  - he has been on carbo/pembro and 5FU and has had three cycles since July 2022  - recent CTA of neck with enlargement of the mass  - will set up PET and ask rad/onc to re-evaluate for XRT options  - NCCN guidelines reviewed and on  chart (version 2.2022)    9/29/2022:  - He is here with his sister-in-law today. He saw Dr Lucero with Rad/onc this am. They are going to present his case to H&N Tumor Board at [facility] and plans to see Dr James about surgical options. If he is not a surgical candidate then will proceed with cisplatin and XRT combine therapy.     10/6/2022:  - He saw Dr Lucero with Rad/onc, and Dr James and his case was presented to H&N Tumor Board at [facility] and  he general consensus was to proceed to surgery.  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   - He had his portacath removed on 11/28.   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, keratinizing type, moderate to poorly differentiated tumor  - Four LN's were all negative  - he did have a positive left superior pharyngeal margin  - pT3 pN0  - reviewed the latest NCCN guidelines again from Version 1.2023; he may need some further systemic therapy due to the margin  - check on Rad/onc follow-up     1/23/2023:  - s/p new portacath placed on 1/12/2023 with Dr Le  - starting combined chemotherapy and XRT - cisplatin  - s/p chemotherapy school with Whit    2/6/2023:  - he seems to be tolerating the chemotherapy well at this time  - continued with concomitant therapy with XRT    2/22/2023:  - he seems to be tolerating the chemotherapy well at this time  - continued with concomitant therapy with XRT  - labs relatively adequate  - will check on his refills    3/6/2023:  - finishing up XRT this comking  Thursday  - last chemotherapy today     (2) Hx/of Laryngeal cancer in Aug 2020 stage II (cT2 N0)  - s/p radiation followed by bilateral neck dissection and total thyroidectomy     Brief Oncology Summary for his laryngeal CA hx:     Patient was noted to initially have a suspicious lesion on the left true vocal cord by laryngoscopy with Dr Xiao in March 2020 with biopsy showing severe dysplastic changes without definitive invasive process. He had a CT scan on 3/16/2020 which showed a 6mm nodule on the left vocal cord. A repeat Ct scan four months later on 7/22/2020 showed the nodule enlarged to 1.4 x 1.1 cm in size. Patient was then seen by Dr Noel and underwent repeat scope in Aug 2020 who found a bulky deeply invading tumor which was debulked and the pathology coming back moderately differentiated SCCA without lymphovascular or perineural invasion. Hs case was subsequently presented to the tumor board and the consensus was to proceed with radiation. He completed XRT on 10/30/2020 and proceeded with regular laryngoscopy surveillance. He eventually required salvage laryngectomy and neck dissection with flap with Dr James on Jan 6th 2022. 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 Fifty lymph nodes were removed which were all negative. Pathology TNM was pT4a, pN0. Patient did not require any adjuvant therapy afterwards.      (2) HTN and hypercholesterolemia     (3) Paroxysmal atrial fibrillation, V tach     (4) DM II     (5) B12 deficiency      (6) Fatty liver disease, GERD           VISIT DIAGNOSES:      Malignant neoplasm of base of tongue    Chemotherapy-induced neutropenia    Larynx cancer    Laryngeal cancer    Iron deficiency anemia due to chronic blood loss    Vitamin B12 deficiency    Abnormal CT scan, neck          PLAN:  Continued with systemic concomitant therapy with Cisplat  and XRT - last therapy today   s/p chemotherapy school with Briana - discussed the side-effect profile, provided literature and obtained consents  F/u Rad/onc follow-up as directed  F/u with Dr James with ENT and Dr Lucero with rad/onc  Check labs weekly  F/u with PCP, ENT, etc  Dietician f/u on the tube feeds     RTC in 2 week with Whit and 4 weeks with myself  Fax note to  Bandar/Dameon Lucero Hasney, Yesenia Gomez       Discussion:     COVID-19 Discussion:     I had long discussion with patient and any applicable family about the COVID-19 coronavirus epidemic and the recommended precautions with regard to cancer and/or hematology patients. I have re-iterated the CDC recommendations for adequate hand washing, use of hand -like products, and coughing into elbow, etc. In addition, especially for our patients who are on chemotherapy and/or our otherwise immunocompromised patients, I have recommended avoidance of crowds, including movie theaters, restaurants, churches, etc. I have recommended avoidance of any sick or symptomatic family members and/or friends. I have also recommended avoidance of any raw and unwashed food products, and general avoidance of food items that have not been prepared by themselves. The patient has been asked to call us immediately with any symptom developments, issues, questions or other general concerns.         Pathology Discussion:     I reviewed and discussed the pathology report(s) and radiograph reports (if available) in as simple to understand and/or laymen's terms to the best of my ability. I had an indepth conversation with the patient and went over the patient's individual diagnosis based on the information that was currently available. I discussed the TNM staging process with regard to the patient's particular cancer type, and the calculated stage based on the currently available TNM data and literature. I discussed the available prognostic data with regard  "to the current staging information and how it relates to the prognosis of their particular neoplastic process.          NCCN Guidelines:     I discussed the available treatment option(s) in accordance with the latest literature from the NCCN Clinical Practice Guidelines for the patient's particular type of cancer disorder. The NCCN Guidelines provide a "document evidence-based (and) consensus-driven management" of the care of oncology patients. The treatment recommendations were made not only in accordance to the NCCN guidelines, but also factored in to account the patient's overall age, condition, performance status and their medical co-morbidities. I went over the risks and benefits of the the treatment options (if any could be made) with regard to their particular cancer type, their cancer stage, their age, and their co-morbidities.         Chemotherapy Discussion:      I discussed the available treatment option(s) in accordance with the latest/current national evidence-based guidelines (NCCN, UpToDate, NCI, ASCO, etc where applicable), their overall age/condition and their co-morbidities. I also went over the risks and benefits of the chemotherapy with regard to their particular cancer type, their cancer stage, their age/condition, and their co-morbidities. I provided literature on the chemotherapy regimen and discussed the chemotherapy side-effect profiles of the drug(s). I discussed the importance of compliance with obtaining and monitoring weekly lab work, and went over the potential hematopathology issues and risks with anemia, leucopenia and thrombocytopenia that can occur with chemotherapy. I discussed the potential risks of liver and kidney damage, which could be permanent and could necessitate dialysis long-term if kidney failure developed. I discussed the emetic and/or diarrheal potential of the regimen and the potential need for use of antiemetic and anti-diarrheal medications. I discussed the risk for " development of anaphylactic shock, bronchospasm, dysrhythmia, and respiratory/cardiovascular arrest and/or failure. I discussed the potential risks for development of alopecia, cold sensory issues, ringing in ears, vertigo, cataracts, glaucoma, and neuropathy, all of which could end up being chronic and life-long. Some chemotherpyI discussed the risks of hand-foot syndrome and rashes, and development of other autoimmune mediated processes such as pneumonitis, hepatitis, and colitis which could be life threatening. I discussed the risks of the potential development of a rare but fatal viral mediated disease known as PML (Progressive Multifocal Leukoencephalopathy), and risk of future development of leukemia and/or lymphoma from use of certain chemotherapy agents. I discussed the need for neutropenic precautions, basic hygiene/sanitation behaviors and dietary restrictions.    The patient's consent has been obtained to proceed with the chemotherapy.The patient will be referred to Chemotherapy School [facility] for training and education on chemotherapy, use of antiemetics and/or anti-diarrheals, use of NSAID's, potential chemotherapy side-effects, and any specific recommendations and precautions with the particular chemotherapy agents.      I answered all of the patient's (and family's, if applicable) questions to the best of my ability and to their complete satisfaction. The patient acknowledged full understanding of the risks, recommendations and plan(s).     I have explained and the patient understands all of  the current recommendation(s). I have answered all of their questions to the best of my ability and to their complete satisfaction.         I spent over 25 mins of time with the patient. Reviewed results of the recently ordered labs, tests and studies; made directives with regards to the results. Over half of this time was spent couseling and coordinating care.    I have explained all of the above in  detail and the patient understands all of the current recommendation(s). I have answered all of their questions to the best of my ability and to their complete satisfaction.   The patient is to continue with the current management plan.            Electronically signed by Venu Tamez MD

## 2023-03-06 ENCOUNTER — INFUSION (OUTPATIENT)
Dept: INFUSION THERAPY | Facility: HOSPITAL | Age: 73
End: 2023-03-06
Attending: INTERNAL MEDICINE
Payer: MEDICARE

## 2023-03-06 ENCOUNTER — OFFICE VISIT (OUTPATIENT)
Dept: HEMATOLOGY/ONCOLOGY | Facility: CLINIC | Age: 73
End: 2023-03-06
Payer: MEDICARE

## 2023-03-06 ENCOUNTER — DOCUMENTATION ONLY (OUTPATIENT)
Dept: HEMATOLOGY/ONCOLOGY | Facility: CLINIC | Age: 73
End: 2023-03-06

## 2023-03-06 VITALS
TEMPERATURE: 98 F | DIASTOLIC BLOOD PRESSURE: 82 MMHG | RESPIRATION RATE: 15 BRPM | BODY MASS INDEX: 20.76 KG/M2 | SYSTOLIC BLOOD PRESSURE: 138 MMHG | HEIGHT: 66 IN | WEIGHT: 129.19 LBS | OXYGEN SATURATION: 100 % | HEART RATE: 80 BPM

## 2023-03-06 VITALS
HEIGHT: 66 IN | RESPIRATION RATE: 17 BRPM | TEMPERATURE: 98 F | OXYGEN SATURATION: 100 % | HEART RATE: 74 BPM | WEIGHT: 129.19 LBS | BODY MASS INDEX: 20.76 KG/M2 | DIASTOLIC BLOOD PRESSURE: 79 MMHG | SYSTOLIC BLOOD PRESSURE: 122 MMHG

## 2023-03-06 DIAGNOSIS — E53.8 VITAMIN B12 DEFICIENCY: ICD-10-CM

## 2023-03-06 DIAGNOSIS — T45.1X5A CHEMOTHERAPY-INDUCED NEUTROPENIA: ICD-10-CM

## 2023-03-06 DIAGNOSIS — D70.1 CHEMOTHERAPY-INDUCED NEUTROPENIA: ICD-10-CM

## 2023-03-06 DIAGNOSIS — C32.9 LARYNX CANCER: ICD-10-CM

## 2023-03-06 DIAGNOSIS — C32.9 LARYNGEAL CANCER: ICD-10-CM

## 2023-03-06 DIAGNOSIS — D50.0 IRON DEFICIENCY ANEMIA DUE TO CHRONIC BLOOD LOSS: ICD-10-CM

## 2023-03-06 DIAGNOSIS — C01 MALIGNANT NEOPLASM OF BASE OF TONGUE: Primary | ICD-10-CM

## 2023-03-06 DIAGNOSIS — R93.89 ABNORMAL CT SCAN, NECK: ICD-10-CM

## 2023-03-06 DIAGNOSIS — E83.51 HYPOCALCEMIA: ICD-10-CM

## 2023-03-06 DIAGNOSIS — E86.0 DEHYDRATION: ICD-10-CM

## 2023-03-06 PROCEDURE — 3008F BODY MASS INDEX DOCD: CPT | Mod: CPTII,S$GLB,, | Performed by: INTERNAL MEDICINE

## 2023-03-06 PROCEDURE — 3074F PR MOST RECENT SYSTOLIC BLOOD PRESSURE < 130 MM HG: ICD-10-PCS | Mod: CPTII,S$GLB,, | Performed by: INTERNAL MEDICINE

## 2023-03-06 PROCEDURE — 1126F AMNT PAIN NOTED NONE PRSNT: CPT | Mod: CPTII,S$GLB,, | Performed by: INTERNAL MEDICINE

## 2023-03-06 PROCEDURE — 96367 TX/PROPH/DG ADDL SEQ IV INF: CPT

## 2023-03-06 PROCEDURE — 99214 PR OFFICE/OUTPT VISIT, EST, LEVL IV, 30-39 MIN: ICD-10-PCS | Mod: S$GLB,,, | Performed by: INTERNAL MEDICINE

## 2023-03-06 PROCEDURE — 3074F SYST BP LT 130 MM HG: CPT | Mod: CPTII,S$GLB,, | Performed by: INTERNAL MEDICINE

## 2023-03-06 PROCEDURE — 63600175 PHARM REV CODE 636 W HCPCS: Performed by: NURSE PRACTITIONER

## 2023-03-06 PROCEDURE — 1160F PR REVIEW ALL MEDS BY PRESCRIBER/CLIN PHARMACIST DOCUMENTED: ICD-10-PCS | Mod: CPTII,S$GLB,, | Performed by: INTERNAL MEDICINE

## 2023-03-06 PROCEDURE — 3078F DIAST BP <80 MM HG: CPT | Mod: CPTII,S$GLB,, | Performed by: INTERNAL MEDICINE

## 2023-03-06 PROCEDURE — 96366 THER/PROPH/DIAG IV INF ADDON: CPT

## 2023-03-06 PROCEDURE — 1160F RVW MEDS BY RX/DR IN RCRD: CPT | Mod: CPTII,S$GLB,, | Performed by: INTERNAL MEDICINE

## 2023-03-06 PROCEDURE — 3008F PR BODY MASS INDEX (BMI) DOCUMENTED: ICD-10-PCS | Mod: CPTII,S$GLB,, | Performed by: INTERNAL MEDICINE

## 2023-03-06 PROCEDURE — 25000003 PHARM REV CODE 250: Performed by: INTERNAL MEDICINE

## 2023-03-06 PROCEDURE — 96361 HYDRATE IV INFUSION ADD-ON: CPT

## 2023-03-06 PROCEDURE — 96413 CHEMO IV INFUSION 1 HR: CPT

## 2023-03-06 PROCEDURE — 96375 TX/PRO/DX INJ NEW DRUG ADDON: CPT

## 2023-03-06 PROCEDURE — 63600175 PHARM REV CODE 636 W HCPCS: Performed by: INTERNAL MEDICINE

## 2023-03-06 PROCEDURE — 3078F PR MOST RECENT DIASTOLIC BLOOD PRESSURE < 80 MM HG: ICD-10-PCS | Mod: CPTII,S$GLB,, | Performed by: INTERNAL MEDICINE

## 2023-03-06 PROCEDURE — 25000003 PHARM REV CODE 250: Performed by: NURSE PRACTITIONER

## 2023-03-06 PROCEDURE — 1159F MED LIST DOCD IN RCRD: CPT | Mod: CPTII,S$GLB,, | Performed by: INTERNAL MEDICINE

## 2023-03-06 PROCEDURE — 1126F PR PAIN SEVERITY QUANTIFIED, NO PAIN PRESENT: ICD-10-PCS | Mod: CPTII,S$GLB,, | Performed by: INTERNAL MEDICINE

## 2023-03-06 PROCEDURE — 1159F PR MEDICATION LIST DOCUMENTED IN MEDICAL RECORD: ICD-10-PCS | Mod: CPTII,S$GLB,, | Performed by: INTERNAL MEDICINE

## 2023-03-06 PROCEDURE — 99214 OFFICE O/P EST MOD 30 MIN: CPT | Mod: S$GLB,,, | Performed by: INTERNAL MEDICINE

## 2023-03-06 RX ORDER — HEPARIN 100 UNIT/ML
500 SYRINGE INTRAVENOUS
Status: DISCONTINUED | OUTPATIENT
Start: 2023-03-06 | End: 2023-03-06 | Stop reason: HOSPADM

## 2023-03-06 RX ORDER — SODIUM CHLORIDE 0.9 % (FLUSH) 0.9 %
10 SYRINGE (ML) INJECTION
Status: DISCONTINUED | OUTPATIENT
Start: 2023-03-06 | End: 2023-03-06 | Stop reason: HOSPADM

## 2023-03-06 RX ADMIN — CALCIUM GLUCONATE: 98 INJECTION, SOLUTION INTRAVENOUS at 08:03

## 2023-03-06 RX ADMIN — HEPARIN 500 UNITS: 100 SYRINGE at 01:03

## 2023-03-06 RX ADMIN — APREPITANT 130 MG: 130 INJECTION, EMULSION INTRAVENOUS at 10:03

## 2023-03-06 RX ADMIN — SODIUM CHLORIDE 1000 ML: 0.9 INJECTION, SOLUTION INTRAVENOUS at 11:03

## 2023-03-06 RX ADMIN — PALONOSETRON HYDROCHLORIDE 0.25 MG: 0.25 INJECTION INTRAVENOUS at 10:03

## 2023-03-06 RX ADMIN — CISPLATIN 66 MG: 100 INJECTION, SOLUTION INTRAVENOUS at 11:03

## 2023-03-06 RX ADMIN — POTASSIUM CHLORIDE 150 ML/HR: 2 INJECTION, SOLUTION, CONCENTRATE INTRAVENOUS at 07:03

## 2023-03-06 NOTE — PLAN OF CARE
Problem: Infection  Goal: Absence of Infection Signs and Symptoms  Outcome: Ongoing, Progressing  Intervention: Prevent or Manage Infection  Flowsheets (Taken 3/6/2023 6004)  Infection Management: aseptic technique maintained  Isolation Precautions:   precautions initiated   precautions maintained

## 2023-03-06 NOTE — PROGRESS NOTES
Medical Nutrition Therapy Oncology Progress Note      Patient's PCP:Maico Patterson MD  Referring Provider: No ref. provider found  Subjective:        Patient ID: Cyrus Batres Jr. is a 72 y.o. male.    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    PEG (percutaneous endoscopic gastrostomy) adjustment/replacement/removal 11/22/2022    Postoperative 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    Tongue cancer     Tracheostomy dependence     Type 2 diabetes mellitus, without long-term current use of insulin 11/22/2022       Past Surgical History:   Procedure Laterality Date    DIRECT LARYNGOBRONCHOSCOPY N/A 12/27/2021    Procedure: LARYNGOSCOPY, DIRECT, WITH BRONCHOSCOPY;  Surgeon: Flex Espinosa MD;  Location: [facility] OR [address] [place] [address];  Service: ENT;  Laterality: N/A;    DIRECT LARYNGOSCOPY  11/9/2022    Procedure: LARYNGOSCOPY, DIRECT;  Surgeon: Jesse James MD;  Location: [facility] OR [address] [place] [address];  Service: ENT;;    DISSECTION OF NECK Bilateral 1/6/2022    Procedure: DISSECTION, NECK;  Surgeon: Jesse James MD;  [facility];  Service: ENT;  Laterality: Bilateral;    DISSECTION OF NECK Bilateral 11/9/2022    Procedure: DISSECTION, NECK;  Surgeon: Jesse James MD;  Location: [facility] OR [address] [place] [address];  Service: ENT;  Laterality: Bilateral;    FLAP PROCEDURE Right 1/6/2022    Procedure: CREATION, FREE FLAP;  Surgeon: Alise Hart MD;  Location: [facility] OR [address] [place] [address];  Service: ENT;  Laterality: Right;  Ischemic start 1351  Ischemic stop 1502    FLAP PROCEDURE Left 11/9/2022     Procedure: CREATION, FREE FLAP, ALT;  Surgeon: Alise Hart MD;  Location: [facility] OR 2ND FLR;  Service: ENT;  Laterality: Left;    GLOSSECTOMY Bilateral 11/9/2022    Procedure: TOTAL GLOSSECTOMY;  Surgeon: Jesse James MD;  Location: [facility] OR 2ND FLR;  Service: ENT;  Laterality: Bilateral;    INSERTION OF TUNNELED CENTRAL VENOUS CATHETER (CVC) WITH SUBCUTANEOUS PORT N/A 6/9/2022    Procedure: [id number];  Surgeon: Jesus Viera MD;  Location: [facility] OR;  Service: General;  Laterality: N/A;    INSERTION OF TUNNELED CENTRAL VENOUS CATHETER (CVC) WITH SUBCUTANEOUS PORT Right 1/12/2023    Procedure: [id number];  Surgeon: Abdulaziz Le Jr., MD;  Location: [facility] OR;  Service: General;  Laterality: Right;    LARYNGECTOMY N/A 1/6/2022    Procedure: LARYNGECTOMY;  Surgeon: Jesse James MD;  [facility];  Service: ENT;  Laterality: N/A;    LARYNGOSCOPY N/A 8/4/2020    Procedure: Suspension microlaryngoscopy with biopsy, possible KTP laser treatment/excision;  Surgeon: Stew Noel MD;  [facility];  Service: ENT;  Laterality: N/A;  Microscope, telescopes, tower, microinstruments, KTP laser, rep conf# [id number] IC 7/28.    LARYNGOSCOPY N/A 3/16/2021    Procedure: Suspension microlaryngoscopy with excision of lesion, possible CO2 laser;  Surgeon: Stew Noel MD;  [facility];  Service: ENT;  Laterality: N/A;  Microscope, telescopes, tower, microinstruments, CO2 laser, rep conf# [id number] IC 3/4.    LARYNGOSCOPY N/A 4/1/2021    Procedure: Suspension microlaryngoscopy with KTP laser excision of lesion;  Surgeon: Stew Noel MD;  [facility];  Service: ENT;  Laterality: N/A;  Microscope, telescopes, tower, microinstruments, 70 degree scope, vocal fold , KTP laser, rep conf# [id number] BC    LARYNGOSCOPY N/A 12/9/2021    Procedure: Suspension microlaryngoscopy with biopsy;  Surgeon: Stew Noel MD;   Location: [facility] OR 2ND FLR;  Service: ENT;  Laterality: N/A;  Microscope, telescopes, tower, microinstruments    LARYNGOSCOPY N/A 1/6/2022    Procedure: LARYNGOSCOPY;  Surgeon: Jesse James MD;  Location: [facility] OR 2ND FLR;  Service: ENT;  Laterality: N/A;    LARYNGOSCOPY N/A 4/27/2022    Procedure: LARYNGOSCOPY WITH BIOPSY;  Surgeon: Jesse James MD;  Location: [facility] OR 2ND FLR;  Service: ENT;  Laterality: N/A;    MICROLARYNGOSCOPY N/A 3/17/2020    Procedure: MICROLARYNGOSCOPY;  Surgeon: Jung Xiao MD;  Location: [facility] OR;  Service: ENT;  Laterality: N/A;  Laser Microlaryngoscopy  NEED TO SCHEDULE LASER from [facility] [id number]    PHARYNGECTOMY  11/9/2022    Procedure: TOTAL PHARYNGECTOMY;  Surgeon: Jesse James MD;  Location: [facility] OR 2ND FLR;  Service: ENT;;    REIMPLANTATION OF PARATHYROID TISSUE N/A 1/6/2022    Procedure: REIMPLANTATION, PARATHYROID TISSUE;  Surgeon: Jesse James MD;  Location: [facility] OR 2ND FLR;  Service: ENT;  Laterality: N/A;    SKIN SPLIT GRAFT Right 11/9/2022    Procedure: APPLICATION, GRAFT, SKIN, SPLIT-THICKNESS;  Surgeon: Jesse James MD;  Location: [facility] OR 2ND FLR;  Service: ENT;  Laterality: Right;    THYROIDECTOMY  1/6/2022    Procedure: THYROIDECTOMY;  Surgeon: Jesse James MD;  Location: [facility] OR [facility] FLR;  Service: ENT;;    TRACHEOSTOMY N/A 12/27/2021    Procedure: CREATION, TRACHEOSTOMY;  Surgeon: Flex Espinosa MD;  Location: [facility] OR 2ND FLR;  Service: ENT;  Laterality: N/A;       Social History     Socioeconomic History    Marital status:      Spouse name: Janel Batres    Number of children: 2   Occupational History    Occupation: [employer]     Employer: [employer]   Tobacco Use    Smoking status: Never    Smokeless tobacco: Never   Substance and Sexual Activity    Alcohol use: Not Currently     Comment: occasional    Drug use: No    Sexual activity: Yes     Partners: Female   Social History Narrative    ** Merged  "History Encounter **         2 children from his 1st wife     Social Determinants of Health     Financial Resource Strain: Low Risk     Difficulty of Paying Living Expenses: Not hard at all   Food Insecurity: No Food Insecurity    Worried About Running Out of Food in the Last Year: Never true    Ran Out of Food in the Last Year: Never true   Transportation Needs: No Transportation Needs    Lack of Transportation (Medical): No    Lack of Transportation (Non-Medical): No   Physical Activity: Insufficiently Active    Days of Exercise per Week: 1 day    Minutes of Exercise per Session: 30 min   Stress: Stress Concern Present    Feeling of Stress : To some extent   Social Connections: Socially Isolated    Frequency of Communication with Friends and Family: Once a week    Frequency of Social Gatherings with Friends and Family: Once a week    Attends Sikhism Services: Never    Active Member of Clubs or Organizations: No    Attends Club or Organization Meetings: Never    Marital Status:    Housing Stability: Low Risk     Unable to Pay for Housing in the Last Year: No    Number of Places Lived in the Last Year: 1    Unstable Housing in the Last Year: No       Family History   Problem Relation Age of Onset    Abnormal EKG Mother     Diabetes Father     Heart disease Father     Hypertension Father        Review of patient's allergies indicates:   Allergen Reactions    Lovastatin Itching    Pollen extracts     Lovastatin Rash     Not confirmed but pt skeptical       Current Outpatient Medications:     acetaminophen (TYLENOL) 325 MG tablet, Take 325 mg by mouth every 6 (six) hours as needed for Pain., Disp: , Rfl:     BD ULTRA-FINE YULISSA PEN NEEDLE 32 gauge x 5/32" Ndle, To be used with Novolog pen up to 4x a day, Disp: 100 each, Rfl: 3    calcitrioL (ROCALTROL) 1 mcg/mL solution, Take 0.25 mLs (0.25 mcg total) by Per G Tube route once daily., Disp: 15 mL, Rfl: 12    calcium carbonate (TUMS) 200 mg calcium (500 mg) " chewable tablet, 2 tablets (1,000 mg total) by Per G Tube route 5 (five) times daily., Disp: 300 tablet, Rfl: 11    calcium carbonate 500 mg/5 mL (1,250 mg/5 mL), 10 mLs (1,000 mg total) by Per G Tube route 4 (four) times daily with meals and nightly., Disp: 473 mL, Rfl: 12    calcium carbonate 500 mg/5 mL (1,250 mg/5 mL), 10 mLs (1,000 mg total) by Per G Tube route 4 (four) times daily with meals and nightly., Disp: 1200 mL, Rfl: 12    esomeprazole (NEXIUM) 40 MG capsule, 40 mg before breakfast., Disp: , Rfl:     famotidine (PEPCID) 40 mg/5 mL (8 mg/mL) suspension, 2.5 mLs (20 mg total) by Per G Tube route 2 (two) times daily., Disp: 50 mL, Rfl: 11    ibuprofen (ADVIL,MOTRIN) 200 MG tablet, Take 200 mg by mouth every 6 (six) hours as needed for Pain., Disp: , Rfl:     insulin aspart U-100 (NOVOLOG FLEXPEN U-100 INSULIN) 100 unit/mL (3 mL) InPn pen, Inject 0-10 Units into the skin as needed; Add correction scale if needed while on TF.  Blood sugar 180-230 add 2 units, 231-280 +4 units, 281-330 +6 units, 331-380 +8 units, >380 +10 units.  MDD 40 units, Disp: 12 mL, Rfl: 0    lactose-reduced food with fibr (JEVITY 1.5 TRISHA) 0.06 gram-1.5 kcal/mL Liqd, 6 cartons per day via peg.  Flush 60ml before and after each bolus, Disp: 180 each, Rfl: 11    levoFLOXacin (LEVAQUIN) 750 MG tablet, Take 750 mg by mouth. for seven days, Disp: , Rfl:     levothyroxine (SYNTHROID) 100 MCG tablet, 1 tablet (100 mcg total) by Per G Tube route before breakfast., Disp: 30 tablet, Rfl: 11    losartan (COZAAR) 100 MG tablet, Take 0.5 tablets (50 mg total) by mouth once daily. (Patient taking differently: Take 50 mg by mouth every evening.), Disp: 45 tablet, Rfl: 3    metFORMIN (GLUCOPHAGE) 500 MG tablet, Take 1 tablet (500 mg total) by mouth 2 (two) times daily with meals., Disp: 60 tablet, Rfl: 3    promethazine (PHENERGAN) 25 MG tablet, Take 1 tablet (25 mg total) by mouth every 4 to 6 hours as needed for Nausea., Disp: 30 tablet, Rfl: 2     traZODone (DESYREL) 50 MG tablet, TAKE 1 TABLET BY MOUTH NIGHTLY AS NEEDED FOR INSOMNIA., Disp: 90 tablet, Rfl: 1    zolpidem (AMBIEN) 5 MG Tab, 1 tablet (5 mg total) by Per G Tube route nightly as needed (difficult with sleep)., Disp: 30 tablet, Rfl: 0  No current facility-administered medications for this visit.    Facility-Administered Medications Ordered in Other Visits:     heparin, porcine (PF) 100 unit/mL injection flush 500 Units, 500 Units, Intravenous, PRN, Venu Tamez MD    sodium chloride 0.9% bolus 1,000 mL 1,000 mL, 1,000 mL, Intravenous, 1 time in Clinic/HOD, Venu Tamez MD, Last Rate: 500 mL/hr at 03/06/23 1114, 1,000 mL at 03/06/23 1114    sodium chloride 0.9% flush 10 mL, 10 mL, Intravenous, PRN, Venu Tamez MD    All medications and past history have been reviewed.    OP HEAD NECK CISPLATIN WEEKLY + RADIOTHERAPY      Treatment Goal:   Curative      Status:   Active      Start Date:   1/23/2023      End Date:   3/6/2023      Provider:   Venu Tamez MD      Chemotherapy:   CISplatin (Platinol) 40 mg/m2 = 66 mg in sodium chloride 0.9% 631 mL chemo infusion, 40 mg/m2 = 66 mg, Intravenous, Clinic/HOD 1 time, 7 of 7 cycles    Administration: 66 mg (1/23/2023), 66 mg (2/13/2023), 66 mg (2/6/2023), 66 mg (2/20/2023), 66 mg (2/27/2023), 66 mg (3/6/2023)      Objective:      Wt Readings from Last 20 Encounters:   03/06/23 58.6 kg (129 lb 3.2 oz)   03/06/23 58.6 kg (129 lb 3.2 oz)   02/27/23 57.5 kg (126 lb 11.2 oz)   02/27/23 57.5 kg (126 lb 11.2 oz)   02/22/23 58.1 kg (128 lb)   02/20/23 58.3 kg (128 lb 9.6 oz)   02/13/23 58.5 kg (129 lb)   02/13/23 58.5 kg (129 lb)   02/07/23 60.1 kg (132 lb 7.9 oz)   02/06/23 56.8 kg (125 lb 3.2 oz)   02/06/23 56.8 kg (125 lb 3.2 oz)   01/31/23 58.7 kg (129 lb 6.6 oz)   01/30/23 56.4 kg (124 lb 7.2 oz)   01/30/23 56.4 kg (124 lb 7.2 oz)   01/27/23 58.5 kg (129 lb)   01/26/23 59 kg (130 lb 1.6 oz)   01/23/23 58.4 kg (128 lb 12 oz)    01/23/23 58.4 kg (128 lb 12 oz)   01/18/23 59.4 kg (130 lb 15.3 oz)   01/13/23 59.4 kg (130 lb 14.4 oz)       Last Labs:  Last Labs:  Glucose   Date Value Ref Range Status   03/03/2023 107 70 - 110 mg/dL Final   02/24/2023 101 70 - 110 mg/dL Final     BUN   Date Value Ref Range Status   03/03/2023 19 8 - 23 mg/dL Final   02/24/2023 22 8 - 23 mg/dL Final     Creatinine   Date Value Ref Range Status   03/03/2023 0.9 0.5 - 1.4 mg/dL Final   02/24/2023 0.8 0.5 - 1.4 mg/dL Final     Sodium   Date Value Ref Range Status   03/03/2023 134 (L) 136 - 145 mmol/L Final   02/24/2023 137 136 - 145 mmol/L Final     Potassium   Date Value Ref Range Status   03/03/2023 4.3 3.5 - 5.1 mmol/L Final   02/24/2023 4.1 3.5 - 5.1 mmol/L Final     Phosphorus   Date Value Ref Range Status   11/24/2022 2.4 (L) 2.7 - 4.5 mg/dL Final   11/23/2022 3.6 2.7 - 4.5 mg/dL Final     Calcium   Date Value Ref Range Status   03/03/2023 7.6 (L) 8.7 - 10.5 mg/dL Final   02/24/2023 8.6 (L) 8.7 - 10.5 mg/dL Final     Prealbumin   Date Value Ref Range Status   09/03/2022 19 (L) 20.0 - 43.0 mg/dL Final     Total Protein   Date Value Ref Range Status   03/03/2023 7.2 6.0 - 8.4 g/dL Final   02/24/2023 7.0 6.0 - 8.4 g/dL Final     Cholesterol   Date Value Ref Range Status   02/14/2022 144 120 - 199 mg/dL Final     Comment:     The National Cholesterol Education Program (NCEP) has set the  following guidelines (reference ranges) for Cholesterol:  Optimal.....................<200 mg/dL  Borderline High.............200-239 mg/dL  High........................> or = 240 mg/dL     11/18/2020 174 120 - 199 mg/dL Final     Comment:     The National Cholesterol Education Program (NCEP) has set the  following guidelines (reference ranges) for Cholesterol:  Optimal.....................<200 mg/dL  Borderline High.............200-239 mg/dL  High........................> or = 240 mg/dL       Hemoglobin A1C   Date Value Ref Range Status   11/22/2022 5.8 4.5 - 6.2 % Final      Comment:     According to ADA guidelines, hemoglobin A1C <7.0% represents  optimal control in non-pregnant diabetic patients.  Different  metrics may apply to specific populations.   Standards of Medical Care in Diabetes - 2016.    For the purpose of screening for the presence of diabetes:  <5.7%     Consistent with the absence of diabetes  5.7-6.4%  Consistent with increasing risk for diabetes   (prediabetes)  >or=6.5%  Consistent with diabetes    Currently no consensus exists for use of hemoglobin A1C  for diagnosis of diabetes for children.     11/09/2022 5.7 (H) 4.0 - 5.6 % Final     Comment:     ADA Screening Guidelines:  5.7-6.4%  Consistent with prediabetes  >or=6.5%  Consistent with diabetes    High levels of fetal hemoglobin interfere with the HbA1C  assay. Heterozygous hemoglobin variants (HbS, HgC, etc)do  not significantly interfere with this assay.   However, presence of multiple variants may affect accuracy.       Hemoglobin   Date Value Ref Range Status   03/03/2023 10.0 (L) 14.0 - 18.0 g/dL Final   02/24/2023 10.0 (L) 14.0 - 18.0 g/dL Final     POC Hematocrit   Date Value Ref Range Status   11/09/2022 25 (L) 36 - 54 %PCV Final   11/09/2022 24 (L) 36 - 54 %PCV Final     Hematocrit   Date Value Ref Range Status   03/03/2023 28.5 (L) 40.0 - 54.0 % Final   02/24/2023 29.4 (L) 40.0 - 54.0 % Final     Iron   Date Value Ref Range Status   11/25/2022 30 (L) 45 - 160 ug/dL Final   09/04/2022 58 45 - 160 ug/dL Final     No components found for: FROLATE  Vit D, 25-Hydroxy   Date Value Ref Range Status   02/14/2022 37 30 - 96 ng/mL Final     Comment:     Vitamin D deficiency.........<10 ng/mL                              Vitamin D insufficiency......10-29 ng/mL       Vitamin D sufficiency........> or equal to 30 ng/mL  Vitamin D toxicity............>100 ng/mL     11/18/2020 38 30 - 96 ng/mL Final     Comment:     Vitamin D deficiency.........<10 ng/mL                              Vitamin D  insufficiency......10-29 ng/mL       Vitamin D sufficiency........> or equal to 30 ng/mL  Vitamin D toxicity............>100 ng/mL       WBC   Date Value Ref Range Status   03/03/2023 3.08 (L) 3.90 - 12.70 K/uL Final   02/24/2023 3.44 (L) 3.90 - 12.70 K/uL Final       Assessment:     Nutrition/Diet History     Patient Reported Diet/Restrictions/Preferences: sips of thin liquids  Food Allergies: NKFA  Factors Affecting Nutritional Intake: s/p laryngectomy, glossectomy    Estimated/Assessed Needs     Weight Used For Calorie Calculations: 62 kg (137 lb)  Energy Calorie Requirements (kcal): 1197-9699 kcal/day   Energy Need Method: 30-35 Kcal/kg  Protein Requirements:  g/day   Protein Need Method: 1.5-2.0 g/kg  Fluid Requirements: 2000 ml/day  Estimated Fluid Requirement Method: 1ml/kcal      Nutrition Support  Current EN: Jevity 1.5- 6 cartons per day with 60ml FWF before and after each bolus. Provides 2130 calories, 90g protein and 1800ml FW. Recommend extra 60ml FWF TID to meet estimated fluid needs.    Evaluation of Received Nutrient/Fluid Intake     Calorie Intake: meeting needs  Protein Intake: meeting needs  Fluid Intake: meeting needs  Tolerance: tolerating  % Intake of Estimated Energy Needs: 100 % via peg      Nutrition Diagnosis Related to (Etiology) As Evidenced By (Signs/Symptoms)   Unintended weight loss Physiological causes increasing nutrient needs BoT cancer, Estimated intake of protein insufficient to meet requirements     RD Notes  Mr. Cyrus Batres is a 73y/o male with 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. Pt is schedule for glossectomy surgery for his base of tongue cancer in 1 month. He is currently NPO and recently switch to Jevity 1.5 and is tolerating 6 cartons per day without associated N/V/D, gas or bloating. He does have chronic diarrhea not associated with feeding times. He present today with questions regarding how he can increase his nutrition to  prepare for surgery next month. He reports he would like to switch to Max Rumpus to promote regular BMs and help control BG. He reports recently weight gain of 7-8# since increasing his TF to 6 cartons per day. CW: 137#    1/23/22: RD met with Mr Medardo myrick today for nutrition follow up. Pt will be starting new treatment with cisplatin and concurrent XRT s/p extensive resection last month. Pt denies having any current problems with peg feedings. He has good knowledge of nutrition related side effects of treatment. Denies N/V/D/C. BMs normal. CW: 128#    1/30: Pt reports he continues to bolus 5 cartons of Jevity per day without s/s of intolerance. He is working himself up to 6 cartons per day due to increased needs during CCXRT. He denies N/V/D/C. He reports good hydration via peg. Noted 43 weight loss in 1 week but pt reports he was wearing a heavy coat last week when his weight was taken. CW: 124#    2/6: Pt reports good tolerance to treatment so far. Denies any N/V/D/C, gas or bloating with EN. Continues to work himself up to 6 cartons per day with extra fluids via peg. He denies having any nutrition related questions or concerns at the current time. CW: 125#    2/13: RD met with Mr Batres for nutrition follow up today. Pt is starting cycle 3 of treatment today and denies any N/V/D/C, gas, bloating. He denies having any problems with his peg. He reports he was cleared by ST and his surgeon  to take sips of liquids including smoothies. He denies having any nutrition related questions or concerns at the current time. CW: 129#    2/27: Pt reports good tolerance to his treatment but has been having some nausea over the last week. He continues to use his peg for majority of intake but has started sipping on juice and thin smoothies with ST. He denies having any nutrition related questions or concerns at the current time. CW: 126#    3/6: Pt reports continued nausea/upset stomach but otherwise is tolerating treatment  well. This is his last week of CCXRT. He is tolerating his EN well and has been trying to increase intake of smoothies po with help of ST. He denies having any nutrition related questions or concerns at the current time. CW: 129#    Nutrition Intervention:      Nutrition Intervention Enteral Nutrition  Composition   Goals/Expected Outcomes Continue EN at 6 cartons per day to meet estimated nutritional needs and promote regular BMs   Progress Progressing towards goal     Nutrition Intervention Fluid-modified diet   Goals/Expected Outcomes Continue aggressive hydration via peg to prevent dehydration during therapy   Progress Initial     Plan  Continue EN at goal rate. Advance to 6 cartons per day as tolerated to prevent unintentional weight loss  Continue aggressive hydration via peg to prevent dehydration  Continue oral intake with SLP guidance  Take antiemetics as prescribed  Provided RD contact info. Encouraged pt to contact RD with questions or concerns    Monitoring/Evaluation:     Monitor: TF tolerance, weight, BMs    Next Visit: f/u weekly or as needed      I have explained and the patient understands all of  the current recommendation(s). I have answered all of their questions to the best of my ability and to their complete satisfaction.   The patient is to continue with the current management plan.    Electronically signed by: Barbara Zayas MBA, RDN, LDN

## 2023-03-07 ENCOUNTER — OFFICE VISIT (OUTPATIENT)
Dept: SURGICAL ONCOLOGY | Facility: CLINIC | Age: 73
End: 2023-03-07
Payer: MEDICARE

## 2023-03-07 ENCOUNTER — INFUSION (OUTPATIENT)
Dept: INFUSION THERAPY | Facility: HOSPITAL | Age: 73
End: 2023-03-07
Attending: INTERNAL MEDICINE
Payer: MEDICARE

## 2023-03-07 VITALS — BODY MASS INDEX: 21.04 KG/M2 | HEIGHT: 66 IN | WEIGHT: 130.94 LBS

## 2023-03-07 VITALS
RESPIRATION RATE: 15 BRPM | SYSTOLIC BLOOD PRESSURE: 126 MMHG | TEMPERATURE: 97 F | HEIGHT: 66 IN | BODY MASS INDEX: 20.92 KG/M2 | DIASTOLIC BLOOD PRESSURE: 66 MMHG | HEART RATE: 72 BPM | OXYGEN SATURATION: 100 % | WEIGHT: 130.19 LBS

## 2023-03-07 DIAGNOSIS — C01 MALIGNANT NEOPLASM OF BASE OF TONGUE: ICD-10-CM

## 2023-03-07 DIAGNOSIS — C32.9 LARYNX CANCER: ICD-10-CM

## 2023-03-07 DIAGNOSIS — E86.0 DEHYDRATION: ICD-10-CM

## 2023-03-07 DIAGNOSIS — E83.51 HYPOCALCEMIA: Primary | ICD-10-CM

## 2023-03-07 DIAGNOSIS — C32.9 LARYNGEAL CANCER: Primary | ICD-10-CM

## 2023-03-07 PROCEDURE — 99999 PR PBB SHADOW E&M-EST. PATIENT-LVL III: CPT | Mod: PBBFAC,,, | Performed by: OTOLARYNGOLOGY

## 2023-03-07 PROCEDURE — A4216 STERILE WATER/SALINE, 10 ML: HCPCS | Performed by: INTERNAL MEDICINE

## 2023-03-07 PROCEDURE — 99999 PR PBB SHADOW E&M-EST. PATIENT-LVL III: ICD-10-PCS | Mod: PBBFAC,,, | Performed by: OTOLARYNGOLOGY

## 2023-03-07 PROCEDURE — 99213 PR OFFICE/OUTPT VISIT, EST, LEVL III, 20-29 MIN: ICD-10-PCS | Mod: S$GLB,,, | Performed by: OTOLARYNGOLOGY

## 2023-03-07 PROCEDURE — 1126F AMNT PAIN NOTED NONE PRSNT: CPT | Mod: CPTII,S$GLB,, | Performed by: OTOLARYNGOLOGY

## 2023-03-07 PROCEDURE — 3288F PR FALLS RISK ASSESSMENT DOCUMENTED: ICD-10-PCS | Mod: CPTII,S$GLB,, | Performed by: OTOLARYNGOLOGY

## 2023-03-07 PROCEDURE — 25000003 PHARM REV CODE 250: Performed by: NURSE PRACTITIONER

## 2023-03-07 PROCEDURE — 96366 THER/PROPH/DIAG IV INF ADDON: CPT

## 2023-03-07 PROCEDURE — 3288F FALL RISK ASSESSMENT DOCD: CPT | Mod: CPTII,S$GLB,, | Performed by: OTOLARYNGOLOGY

## 2023-03-07 PROCEDURE — 1160F RVW MEDS BY RX/DR IN RCRD: CPT | Mod: CPTII,S$GLB,, | Performed by: OTOLARYNGOLOGY

## 2023-03-07 PROCEDURE — 63600175 PHARM REV CODE 636 W HCPCS: Performed by: NURSE PRACTITIONER

## 2023-03-07 PROCEDURE — 99213 OFFICE O/P EST LOW 20 MIN: CPT | Mod: S$GLB,,, | Performed by: OTOLARYNGOLOGY

## 2023-03-07 PROCEDURE — 3008F BODY MASS INDEX DOCD: CPT | Mod: CPTII,S$GLB,, | Performed by: OTOLARYNGOLOGY

## 2023-03-07 PROCEDURE — 1159F MED LIST DOCD IN RCRD: CPT | Mod: CPTII,S$GLB,, | Performed by: OTOLARYNGOLOGY

## 2023-03-07 PROCEDURE — 1101F PR PT FALLS ASSESS DOC 0-1 FALLS W/OUT INJ PAST YR: ICD-10-PCS | Mod: CPTII,S$GLB,, | Performed by: OTOLARYNGOLOGY

## 2023-03-07 PROCEDURE — 96365 THER/PROPH/DIAG IV INF INIT: CPT

## 2023-03-07 PROCEDURE — 1126F PR PAIN SEVERITY QUANTIFIED, NO PAIN PRESENT: ICD-10-PCS | Mod: CPTII,S$GLB,, | Performed by: OTOLARYNGOLOGY

## 2023-03-07 PROCEDURE — 25000003 PHARM REV CODE 250: Performed by: INTERNAL MEDICINE

## 2023-03-07 PROCEDURE — 1159F PR MEDICATION LIST DOCUMENTED IN MEDICAL RECORD: ICD-10-PCS | Mod: CPTII,S$GLB,, | Performed by: OTOLARYNGOLOGY

## 2023-03-07 PROCEDURE — 1160F PR REVIEW ALL MEDS BY PRESCRIBER/CLIN PHARMACIST DOCUMENTED: ICD-10-PCS | Mod: CPTII,S$GLB,, | Performed by: OTOLARYNGOLOGY

## 2023-03-07 PROCEDURE — 63600175 PHARM REV CODE 636 W HCPCS: Performed by: INTERNAL MEDICINE

## 2023-03-07 PROCEDURE — 3008F PR BODY MASS INDEX (BMI) DOCUMENTED: ICD-10-PCS | Mod: CPTII,S$GLB,, | Performed by: OTOLARYNGOLOGY

## 2023-03-07 PROCEDURE — 1101F PT FALLS ASSESS-DOCD LE1/YR: CPT | Mod: CPTII,S$GLB,, | Performed by: OTOLARYNGOLOGY

## 2023-03-07 RX ORDER — HEPARIN 100 UNIT/ML
500 SYRINGE INTRAVENOUS
Status: CANCELLED | OUTPATIENT
Start: 2023-03-07

## 2023-03-07 RX ORDER — SODIUM CHLORIDE 0.9 % (FLUSH) 0.9 %
10 SYRINGE (ML) INJECTION
Status: CANCELLED | OUTPATIENT
Start: 2023-03-07

## 2023-03-07 RX ORDER — HEPARIN 100 UNIT/ML
500 SYRINGE INTRAVENOUS
Status: DISCONTINUED | OUTPATIENT
Start: 2023-03-07 | End: 2023-03-07 | Stop reason: HOSPADM

## 2023-03-07 RX ORDER — SODIUM CHLORIDE 0.9 % (FLUSH) 0.9 %
10 SYRINGE (ML) INJECTION
Status: DISCONTINUED | OUTPATIENT
Start: 2023-03-07 | End: 2023-03-07 | Stop reason: HOSPADM

## 2023-03-07 RX ADMIN — CALCIUM GLUCONATE: 98 INJECTION, SOLUTION INTRAVENOUS at 07:03

## 2023-03-07 RX ADMIN — SODIUM CHLORIDE, PRESERVATIVE FREE 10 ML: 5 INJECTION INTRAVENOUS at 09:03

## 2023-03-07 RX ADMIN — HEPARIN 500 UNITS: 100 SYRINGE at 09:03

## 2023-03-07 NOTE — PROGRESS NOTES
Chief Complaint   Patient presents with    Follow-up     1 month follow up Larynx cancer. States has some nausea but no other complaints.      Oncology History   Larynx cancer   8/4/2020 Initial Diagnosis    Larynx neoplasm malignant     8/6/2020 Tumor Conference    His case was discussed at the Multidisciplinary Head and Neck Team Planning Meeting.    Representatives from Medical Oncology, Radiation Oncology, Head and Neck Surgical Oncology, Psychosocial Oncology, and Speech and Language Pathology discussed the case with the following recommendations:    1) definitive radiation              8/6/2020 Cancer Staged    Staging form: Larynx - Glottis, AJCC 8th Edition  - Clinical stage from 8/6/2020: Stage II (cT2, cN0, cM0)       8/6/2020 Cancer Staged    Cancer Staging  Larynx neoplasm malignant  Staging form: Larynx - Glottis, AJCC 8th Edition  - Clinical stage from 8/6/2020: Stage II (cT2, cN0, cM0) - Signed by Fariha Muñoz NP on 8/6/2020 12/16/2021 Tumor Conference    His case was discussed at the Multidisciplinary Head and Neck Team Planning Meeting.    Representatives from Medical Oncology, Radiation Oncology, Head and Neck Surgical Oncology, Psychosocial Oncology, and Speech and Language Pathology discussed the case with the following recommendations:    1) TL  2) oncology referral  3) psychology referral            12/27/2021 Surgery    1. DL And tracheostomy  2. PEG     1/6/2022 Surgery    1. Diagnostic direct laryngoscopy  2. Bilateral modified neck dissection of levels 2 through 4  3. Total laryngectomy  4. Total thyroidectomy with bilateral paratracheal/upper mediastinal neck dissection  5. Autotransplantation of left inferior parathyroid into left sternocleidomastoid muscle   6. Left anterolateral thigh free flap for closure of total laryngectomy neck skin defect  7. Advancement flap for closure of left thigh donor site advanced area was 25 x 10 cm     1/20/2022 Cancer Staged    Staging form:  Larynx - Glottis, AJCC 8th Edition  - Pathologic stage from 1/20/2022: Stage LOU (pT4a, pN0, cM0)       5/30/2022 Cancer Staged    Staging form: Pharynx - P16 Negative Oropharynx, AJCC 8th Edition  - Clinical: Stage II (rcT2, cN0, cM0)       1/23/2023 -  Chemotherapy    Treatment Summary   Plan Name: OP HEAD NECK CISPLATIN WEEKLY + RADIOTHERAPY  Treatment Goal: Curative  Status: Active  Start Date: 1/23/2023  End Date: 3/6/2023  Provider: Venu Tamez MD  Chemotherapy: CISplatin (Platinol) 40 mg/m2 = 66 mg in sodium chloride 0.9% 631 mL chemo infusion, 40 mg/m2 = 66 mg, Intravenous, Clinic/HOD 1 time, 7 of 7 cycles  Administration: 66 mg (1/23/2023), 66 mg (2/13/2023), 66 mg (2/6/2023), 66 mg (2/20/2023), 66 mg (2/27/2023), 66 mg (3/6/2023)       Malignant neoplasm of base of tongue   5/20/2022 Cancer Staged    Staging form: Pharynx - P16 Negative Oropharynx, AJCC 8th Edition  - Clinical stage from 5/20/2022: Stage II (cT2, cN0, cM0, p16-)       6/22/2022 Initial Diagnosis    Primary cancer of base of tongue     7/11/2022 - 8/26/2022 Chemotherapy    Treatment Summary   Plan Name: OP HEAD NECK PEMBROLIZUMAB CARBOPLATIN FLUOROURACIL Q3W FOLLOWED BY MAINTENANCE PEMBROLIZUMAB 400 MG Q6W  Treatment Goal: Palliative  Status: Inactive  Start Date: 7/11/2022  End Date: 8/26/2022  Provider: Aurash Khoobehi, MD  Chemotherapy: CARBOplatin (PARAPLATIN) 440 mg in sodium chloride 0.9% 544 mL chemo infusion, 440 mg (100 % of original dose 438.5 mg), Intravenous, Clinic/HOD 1 time, 3 of 6 cycles  Dose modification:   (original dose 438.5 mg, Cycle 1)  Administration: 440 mg (7/11/2022), 435 mg (8/1/2022), 510 mg (8/22/2022)       1/23/2023 -  Chemotherapy    Treatment Summary   Plan Name: OP HEAD NECK CISPLATIN WEEKLY + RADIOTHERAPY  Treatment Goal: Curative  Status: Active  Start Date: 1/23/2023  End Date: 3/6/2023  Provider: Venu Tamez MD  Chemotherapy: CISplatin (Platinol) 40 mg/m2 = 66 mg in sodium chloride 0.9%  631 mL chemo infusion, 40 mg/m2 = 66 mg, Intravenous, Clinic/HOD 1 time, 7 of 7 cycles  Administration: 66 mg (1/23/2023), 66 mg (2/13/2023), 66 mg (2/6/2023), 66 mg (2/20/2023), 66 mg (2/27/2023), 66 mg (3/6/2023)             HPI   72 y.o. male presents with the above treatment history.  He is now 4 mos status post total glossectomy and ALT free flap reconstruction. No significant complaints.  He is currently undergoing CRT.    Review of Systems   Constitutional: Negative for fatigue and unexpected weight change.   HENT: Per HPI.  Eyes: Negative for visual disturbance.   Respiratory: Negative for shortness of breath, hemoptysis   Cardiovascular: Negative for chest pain and palpitations.   Musculoskeletal: Negative for decreased ROM, back pain.   Skin: Negative for rash, sunburn, itching.   Neurological: Negative for dizziness and seizures.   Hematological: Negative for adenopathy. Does not bruise/bleed easily.   Endocrine: Negative for rapid weight loss/weight gain, heat/cold intolerance.     Past Medical History   Patient Active Problem List   Diagnosis    Melanocytic nevus    Body mass index (BMI) of 29.0-29.9 in adult    Benign hypertension    Overweight    Paroxysmal atrial fibrillation    Type 2 diabetes mellitus with diabetic arthropathy, with long-term current use of insulin    Fatty liver disease, nonalcoholic    False positive serological test for hepatitis C    Hyperlipidemia    Type 2 diabetes mellitus with hyperglycemia, without long-term current use of insulin    Gastroesophageal reflux disease without esophagitis    Type 2 diabetes mellitus with hyperglycemia    Vitamin B12 deficiency    Hyperuricemia    Hypovitaminosis D    Proteinuria    On enteral nutrition    Larynx cancer    Dehydration    NSVT (nonsustained ventricular tachycardia)    Adverse effect of adrenal cortical steroids, sequela    S/P laryngectomy    Hypocalcemia    Aphonia    Dysphagia, pharyngoesophageal    Acute pain of both  shoulders    Bilateral arm weakness    Oral bleeding    Malignant neoplasm of base of tongue    Advanced care planning/counseling discussion    Iron deficiency anemia due to chronic blood loss    Postoperative hypothyroidism    Pressure injury of sacral region, stage 1    Pneumatosis intestinalis    Nausea and vomiting    Neutropenia    Abnormal CT scan, neck    Open wound of neck    Open wnd of pharynx    Open wound of left thigh    Open wound of mouth    Tracheostomy in place    Malnutrition of mild degree    Type 2 diabetes mellitus, without long-term current use of insulin    Laryngeal cancer    Hypocalcemia    Hyperbilirubinemia    Elevated transaminase level    Hyponatremia    PEG (percutaneous endoscopic gastrostomy) adjustment/replacement/removal    Hypothyroidism    Dehydration    Pharyngocutaneous fistula    Protein malnutrition           Past Surgical History   Past Surgical History:   Procedure Laterality Date    DIRECT LARYNGOBRONCHOSCOPY N/A 12/27/2021    Procedure: LARYNGOSCOPY, DIRECT, WITH BRONCHOSCOPY;  Surgeon: Flex Espinosa MD;  Location: [address];  Service: ENT;  Laterality: N/A;    DIRECT LARYNGOSCOPY  11/9/2022    Procedure: LARYNGOSCOPY, DIRECT;  Surgeon: Jesse James MD;  Location: [address];  Service: ENT;;    DISSECTION OF NECK Bilateral 1/6/2022    Procedure: DISSECTION, NECK;  Surgeon: Jesse James MD;  Location: [address];  Service: ENT;  Laterality: Bilateral;    DISSECTION OF NECK Bilateral 11/9/2022    Procedure: DISSECTION, NECK;  Surgeon: Jesse James MD;  Location: [address];  Service: ENT;  Laterality: Bilateral;    FLAP PROCEDURE Right 1/6/2022    Procedure: CREATION, FREE FLAP;  Surgeon: Alise Hart MD;  Location: [address];  Service: ENT;  Laterality: Right;  Ischemic start 1351  Ischemic stop 1502    FLAP PROCEDURE Left 11/9/2022    Procedure: CREATION, FREE FLAP, ALT;  Surgeon: Alise Hart MD;  Location: [facility]  2ND FLR;  Service: ENT;  Laterality: Left;    GLOSSECTOMY Bilateral 11/9/2022    Procedure: TOTAL GLOSSECTOMY;  Surgeon: Jesse James MD;  Location: [facility] OR 2ND FLR;  Service: ENT;  Laterality: Bilateral;    INSERTION OF TUNNELED CENTRAL VENOUS CATHETER (CVC) WITH SUBCUTANEOUS PORT N/A 6/9/2022    Procedure: [id number]-A-CATH;  Surgeon: Jesus Viera MD;  Location: [facility] OR;  Service: General;  Laterality: N/A;    INSERTION OF TUNNELED CENTRAL VENOUS CATHETER (CVC) WITH SUBCUTANEOUS PORT Right 1/12/2023    Procedure: [id number]-X-CATH;  Surgeon: Abdulaziz Le Jr., MD;  Location: [facility] OR;  Service: General;  Laterality: Right;    LARYNGECTOMY N/A 1/6/2022    Procedure: LARYNGECTOMY;  Surgeon: Jesse James MD;  [facility];  Service: ENT;  Laterality: N/A;    LARYNGOSCOPY N/A 8/4/2020    Procedure: Suspension microlaryngoscopy with biopsy, possible KTP laser treatment/excision;  Surgeon: Stew Noel MD;  [facility];  Service: ENT;  Laterality: N/A;  Microscope, telescopes, tower, microinstruments, KTP laser, rep conf# [id number] IC 7/28.    LARYNGOSCOPY N/A 3/16/2021    Procedure: Suspension microlaryngoscopy with excision of lesion, possible CO2 laser;  Surgeon: Stew Noel MD;  [facility];  Service: ENT;  Laterality: N/A;  Microscope, telescopes, tower, microinstruments, CO2 laser, rep conf# [id number] IC 3/4.    LARYNGOSCOPY N/A 4/1/2021    Procedure: Suspension microlaryngoscopy with KTP laser excision of lesion;  Surgeon: Stew Noel MD;  [facility];  Service: ENT;  Laterality: N/A;  Microscope, telescopes, tower, microinstruments, 70 degree scope, vocal fold , KTP laser, rep conf# [id number] BC    LARYNGOSCOPY N/A 12/9/2021    Procedure: Suspension microlaryngoscopy with biopsy;  Surgeon: Stew Noel MD;  Location: [facility] OR [address] [place] [address];  Service: ENT;  Laterality: N/A;  Microscope,  telescopes, tower, microinstruments    LARYNGOSCOPY N/A 1/6/2022    Procedure: LARYNGOSCOPY;  Surgeon: Jesse James MD;  Location: [facility] OR [facility] FLR;  Service: ENT;  Laterality: N/A;    LARYNGOSCOPY N/A 4/27/2022    Procedure: LARYNGOSCOPY WITH BIOPSY;  Surgeon: Jesse James MD;  Location: [facility] OR [facility] FLR;  Service: ENT;  Laterality: N/A;    MICROLARYNGOSCOPY N/A 3/17/2020    Procedure: MICROLARYNGOSCOPY;  Surgeon: Jung Xiao MD;  Location: [facility] OR;  Service: ENT;  Laterality: N/A;  Laser Microlaryngoscopy  [id number]    PHARYNGECTOMY  11/9/2022    Procedure: TOTAL PHARYNGECTOMY;  Surgeon: Jesse James MD;  [facility];  Service: ENT;;    REIMPLANTATION OF PARATHYROID TISSUE N/A 1/6/2022    Procedure: REIMPLANTATION, PARATHYROID TISSUE;  Surgeon: Jesse James MD;  Location: [facility] OR [facility] FLR;  Service: ENT;  Laterality: N/A;    SKIN SPLIT GRAFT Right 11/9/2022    Procedure: APPLICATION, GRAFT, SKIN, SPLIT-THICKNESS;  Surgeon: Jesse James MD;  [facility];  Service: ENT;  Laterality: Right;    THYROIDECTOMY  1/6/2022    Procedure: THYROIDECTOMY;  Surgeon: Jesse James MD;  [facility];  Service: ENT;;    TRACHEOSTOMY N/A 12/27/2021    Procedure: CREATION, TRACHEOSTOMY;  Surgeon: Flex Espinosa MD;  [facility];  Service: ENT;  Laterality: N/A;         Family History   Family History   Problem Relation Age of Onset    Abnormal EKG Mother     Diabetes Father     Heart disease Father     Hypertension Father            Social History   .  Social History     Socioeconomic History    Marital status:      Spouse name: Janel Batres    Number of children: 2   Occupational History    Occupation: [employer]     Employer: [employer]   Tobacco Use    Smoking status: Never    Smokeless tobacco: Never   Substance and Sexual Activity    Alcohol use: Not Currently     Comment:  occasional    Drug use: No    Sexual activity: Yes     Partners: Female   Social History Narrative    ** Merged History Encounter **         2 children from his 1st wife     Social Determinants of Health     Financial Resource Strain: Low Risk     Difficulty of Paying Living Expenses: Not hard at all   Food Insecurity: No Food Insecurity    Worried About Running Out of Food in the Last Year: Never true    Ran Out of Food in the Last Year: Never true   Transportation Needs: No Transportation Needs    Lack of Transportation (Medical): No    Lack of Transportation (Non-Medical): No   Physical Activity: Insufficiently Active    Days of Exercise per Week: 1 day    Minutes of Exercise per Session: 30 min   Stress: Stress Concern Present    Feeling of Stress : To some extent   Social Connections: Socially Isolated    Frequency of Communication with Friends and Family: Once a week    Frequency of Social Gatherings with Friends and Family: Once a week    Attends Adventism Services: Never    Active Member of Clubs or Organizations: No    Attends Club or Organization Meetings: Never    Marital Status:    Housing Stability: Low Risk     Unable to Pay for Housing in the Last Year: No    Number of Places Lived in the Last Year: 1    Unstable Housing in the Last Year: No         Allergies   Review of patient's allergies indicates:   Allergen Reactions    Lovastatin Itching    Pollen extracts     Lovastatin Rash     Not confirmed but pt skeptical           Physical Exam     There were no vitals filed for this visit.        Body mass index is 21.14 kg/m².      General: AOx3, NAD   Respiratory:  Symmetric chest rise, normal effort  Oral Cavity:  Flap healthy. No worrisome lesions.   Oropharynx:  No masses/lesions of the posterior pharyngeal wall. Tonsillar fossa without lesions. Soft palate without masses. Midline uvula.   Neck: Stoma widely patent. Skin paddle healthy with good color and turgor.Well-healed neck incisions.No  LAD. Moderate post-op, post-treatment fibrosis. Submental neck diffusely full.  Face: House Brackmann I bilaterally.      Assessment/Plan  Problem List Items Addressed This Visit          Oncology    Laryngeal cancer - Primary    Malignant neoplasm of base of tongue     Complete chemoradiation.  Return in 1 month.

## 2023-03-07 NOTE — PLAN OF CARE
Problem: Fatigue  Goal: Improved Activity Tolerance  Outcome: Ongoing, Progressing  Intervention: Promote Improved Energy  Flowsheets (Taken 3/7/2023 4340)  Fatigue Management:   fatigue-related activity identified   frequent rest breaks encouraged   paced activity encouraged  Sleep/Rest Enhancement:   regular sleep/rest pattern promoted   relaxation techniques promoted  Activity Management: Ambulated -L4

## 2023-03-09 ENCOUNTER — TREATMENT (OUTPATIENT)
Dept: RADIATION ONCOLOGY | Facility: CLINIC | Age: 73
End: 2023-03-09
Payer: MEDICARE

## 2023-03-09 PROCEDURE — 77014 PR  CT GUIDANCE PLACEMENT RAD THERAPY FIELDS: CPT | Mod: S$GLB,,, | Performed by: RADIOLOGY

## 2023-03-09 PROCEDURE — G6015 PR RADN TX DELIVERY,  INTENS MOD, 1+ FIELDS PER TX: ICD-10-PCS | Mod: S$GLB,,, | Performed by: RADIOLOGY

## 2023-03-09 PROCEDURE — G6015 RADIATION TX DELIVERY IMRT: HCPCS | Mod: S$GLB,,, | Performed by: RADIOLOGY

## 2023-03-09 PROCEDURE — 77014 PR  CT GUIDANCE PLACEMENT RAD THERAPY FIELDS: ICD-10-PCS | Mod: S$GLB,,, | Performed by: RADIOLOGY

## 2023-03-10 ENCOUNTER — PATIENT MESSAGE (OUTPATIENT)
Dept: HEMATOLOGY/ONCOLOGY | Facility: CLINIC | Age: 73
End: 2023-03-10

## 2023-03-10 ENCOUNTER — LAB VISIT (OUTPATIENT)
Dept: LAB | Facility: HOSPITAL | Age: 73
End: 2023-03-10
Attending: NURSE PRACTITIONER
Payer: MEDICARE

## 2023-03-10 DIAGNOSIS — C01 MALIGNANT NEOPLASM OF BASE OF TONGUE: ICD-10-CM

## 2023-03-10 LAB
ALBUMIN SERPL BCP-MCNC: 4.5 G/DL (ref 3.5–5.2)
ALP SERPL-CCNC: 375 U/L (ref 55–135)
ALT SERPL W/O P-5'-P-CCNC: 89 U/L (ref 10–44)
ANION GAP SERPL CALC-SCNC: 8 MMOL/L (ref 8–16)
AST SERPL-CCNC: 60 U/L (ref 10–40)
BASOPHILS # BLD AUTO: 0 K/UL (ref 0–0.2)
BASOPHILS NFR BLD: 0 % (ref 0–1.9)
BILIRUB SERPL-MCNC: 1.3 MG/DL (ref 0.1–1)
BUN SERPL-MCNC: 24 MG/DL (ref 8–23)
CALCIUM SERPL-MCNC: 7.8 MG/DL (ref 8.7–10.5)
CHLORIDE SERPL-SCNC: 101 MMOL/L (ref 95–110)
CO2 SERPL-SCNC: 25 MMOL/L (ref 23–29)
CREAT SERPL-MCNC: 0.9 MG/DL (ref 0.5–1.4)
DIFFERENTIAL METHOD: ABNORMAL
EOSINOPHIL # BLD AUTO: 0 K/UL (ref 0–0.5)
EOSINOPHIL NFR BLD: 0.4 % (ref 0–8)
ERYTHROCYTE [DISTWIDTH] IN BLOOD BY AUTOMATED COUNT: 15.1 % (ref 11.5–14.5)
EST. GFR  (NO RACE VARIABLE): >60 ML/MIN/1.73 M^2
GLUCOSE SERPL-MCNC: 107 MG/DL (ref 70–110)
HCT VFR BLD AUTO: 28.7 % (ref 40–54)
HGB BLD-MCNC: 9.9 G/DL (ref 14–18)
IMM GRANULOCYTES # BLD AUTO: 0.02 K/UL (ref 0–0.04)
IMM GRANULOCYTES NFR BLD AUTO: 0.7 % (ref 0–0.5)
LYMPHOCYTES # BLD AUTO: 0.7 K/UL (ref 1–4.8)
LYMPHOCYTES NFR BLD: 23.8 % (ref 18–48)
MCH RBC QN AUTO: 32.9 PG (ref 27–31)
MCHC RBC AUTO-ENTMCNC: 34.5 G/DL (ref 32–36)
MCV RBC AUTO: 95 FL (ref 82–98)
MONOCYTES # BLD AUTO: 0.2 K/UL (ref 0.3–1)
MONOCYTES NFR BLD: 6 % (ref 4–15)
NEUTROPHILS # BLD AUTO: 1.9 K/UL (ref 1.8–7.7)
NEUTROPHILS NFR BLD: 69.1 % (ref 38–73)
NRBC BLD-RTO: 0 /100 WBC
PLATELET # BLD AUTO: 149 K/UL (ref 150–450)
PMV BLD AUTO: 10.5 FL (ref 9.2–12.9)
POTASSIUM SERPL-SCNC: 4.6 MMOL/L (ref 3.5–5.1)
PROT SERPL-MCNC: 7.2 G/DL (ref 6–8.4)
RBC # BLD AUTO: 3.01 M/UL (ref 4.6–6.2)
SODIUM SERPL-SCNC: 134 MMOL/L (ref 136–145)
WBC # BLD AUTO: 2.81 K/UL (ref 3.9–12.7)

## 2023-03-10 PROCEDURE — 85025 COMPLETE CBC W/AUTO DIFF WBC: CPT | Performed by: NURSE PRACTITIONER

## 2023-03-10 PROCEDURE — 80053 COMPREHEN METABOLIC PANEL: CPT | Performed by: NURSE PRACTITIONER

## 2023-03-10 PROCEDURE — 77336 PR  RADN PHYSICS CONSULT CONTINUING: ICD-10-PCS | Mod: S$GLB,,, | Performed by: RADIOLOGY

## 2023-03-10 PROCEDURE — 36415 COLL VENOUS BLD VENIPUNCTURE: CPT | Performed by: NURSE PRACTITIONER

## 2023-03-10 PROCEDURE — 77336 RADIATION PHYSICS CONSULT: CPT | Mod: S$GLB,,, | Performed by: RADIOLOGY

## 2023-03-13 ENCOUNTER — INFUSION (OUTPATIENT)
Dept: INFUSION THERAPY | Facility: HOSPITAL | Age: 73
End: 2023-03-13
Attending: INTERNAL MEDICINE
Payer: MEDICARE

## 2023-03-13 VITALS
RESPIRATION RATE: 18 BRPM | WEIGHT: 128 LBS | DIASTOLIC BLOOD PRESSURE: 65 MMHG | HEART RATE: 70 BPM | HEIGHT: 66 IN | SYSTOLIC BLOOD PRESSURE: 118 MMHG | BODY MASS INDEX: 20.57 KG/M2 | TEMPERATURE: 98 F

## 2023-03-13 DIAGNOSIS — C01 MALIGNANT NEOPLASM OF BASE OF TONGUE: ICD-10-CM

## 2023-03-13 DIAGNOSIS — C32.9 LARYNX CANCER: ICD-10-CM

## 2023-03-13 DIAGNOSIS — E83.51 HYPOCALCEMIA: Primary | ICD-10-CM

## 2023-03-13 DIAGNOSIS — E86.0 DEHYDRATION: ICD-10-CM

## 2023-03-13 PROCEDURE — 63600175 PHARM REV CODE 636 W HCPCS: Performed by: INTERNAL MEDICINE

## 2023-03-13 PROCEDURE — 25000003 PHARM REV CODE 250: Performed by: INTERNAL MEDICINE

## 2023-03-13 PROCEDURE — 96365 THER/PROPH/DIAG IV INF INIT: CPT

## 2023-03-13 PROCEDURE — 25000003 PHARM REV CODE 250: Performed by: NURSE PRACTITIONER

## 2023-03-13 PROCEDURE — A4216 STERILE WATER/SALINE, 10 ML: HCPCS | Performed by: INTERNAL MEDICINE

## 2023-03-13 PROCEDURE — 96366 THER/PROPH/DIAG IV INF ADDON: CPT

## 2023-03-13 PROCEDURE — 63600175 PHARM REV CODE 636 W HCPCS: Performed by: NURSE PRACTITIONER

## 2023-03-13 RX ORDER — SODIUM CHLORIDE 0.9 % (FLUSH) 0.9 %
10 SYRINGE (ML) INJECTION
Status: DISCONTINUED | OUTPATIENT
Start: 2023-03-13 | End: 2023-03-13 | Stop reason: HOSPADM

## 2023-03-13 RX ORDER — HEPARIN 100 UNIT/ML
500 SYRINGE INTRAVENOUS
Status: DISCONTINUED | OUTPATIENT
Start: 2023-03-13 | End: 2023-03-13 | Stop reason: HOSPADM

## 2023-03-13 RX ORDER — HEPARIN 100 UNIT/ML
500 SYRINGE INTRAVENOUS
Status: CANCELLED | OUTPATIENT
Start: 2023-03-13

## 2023-03-13 RX ORDER — SODIUM CHLORIDE 0.9 % (FLUSH) 0.9 %
10 SYRINGE (ML) INJECTION
Status: CANCELLED | OUTPATIENT
Start: 2023-03-13

## 2023-03-13 RX ADMIN — SODIUM CHLORIDE, PRESERVATIVE FREE 10 ML: 5 INJECTION INTRAVENOUS at 01:03

## 2023-03-13 RX ADMIN — CALCIUM GLUCONATE: 98 INJECTION, SOLUTION INTRAVENOUS at 11:03

## 2023-03-13 RX ADMIN — HEPARIN 500 UNITS: 100 SYRINGE at 01:03

## 2023-03-13 NOTE — PLAN OF CARE
Problem: Fatigue  Goal: Improved Activity Tolerance  3/13/2023 1140 by Elisabeth Rush RN  Outcome: Met  3/13/2023 1140 by Elisabeth Rush RN  Outcome: Ongoing, Progressing

## 2023-03-17 ENCOUNTER — LAB VISIT (OUTPATIENT)
Dept: LAB | Facility: HOSPITAL | Age: 73
End: 2023-03-17
Attending: NURSE PRACTITIONER
Payer: MEDICARE

## 2023-03-17 DIAGNOSIS — C01 MALIGNANT NEOPLASM OF BASE OF TONGUE: ICD-10-CM

## 2023-03-17 LAB
ALBUMIN SERPL BCP-MCNC: 4.5 G/DL (ref 3.5–5.2)
ALP SERPL-CCNC: 478 U/L (ref 55–135)
ALT SERPL W/O P-5'-P-CCNC: 50 U/L (ref 10–44)
ANION GAP SERPL CALC-SCNC: 9 MMOL/L (ref 8–16)
AST SERPL-CCNC: 34 U/L (ref 10–40)
BASOPHILS # BLD AUTO: 0.01 K/UL (ref 0–0.2)
BASOPHILS NFR BLD: 0.3 % (ref 0–1.9)
BILIRUB SERPL-MCNC: 1 MG/DL (ref 0.1–1)
BUN SERPL-MCNC: 20 MG/DL (ref 8–23)
CALCIUM SERPL-MCNC: 8.7 MG/DL (ref 8.7–10.5)
CHLORIDE SERPL-SCNC: 99 MMOL/L (ref 95–110)
CO2 SERPL-SCNC: 24 MMOL/L (ref 23–29)
CREAT SERPL-MCNC: 0.9 MG/DL (ref 0.5–1.4)
DIFFERENTIAL METHOD: ABNORMAL
EOSINOPHIL # BLD AUTO: 0 K/UL (ref 0–0.5)
EOSINOPHIL NFR BLD: 0.3 % (ref 0–8)
ERYTHROCYTE [DISTWIDTH] IN BLOOD BY AUTOMATED COUNT: 15.7 % (ref 11.5–14.5)
EST. GFR  (NO RACE VARIABLE): >60 ML/MIN/1.73 M^2
GLUCOSE SERPL-MCNC: 117 MG/DL (ref 70–110)
HCT VFR BLD AUTO: 26.9 % (ref 40–54)
HGB BLD-MCNC: 9.3 G/DL (ref 14–18)
IMM GRANULOCYTES # BLD AUTO: 0.02 K/UL (ref 0–0.04)
IMM GRANULOCYTES NFR BLD AUTO: 0.7 % (ref 0–0.5)
LYMPHOCYTES # BLD AUTO: 0.6 K/UL (ref 1–4.8)
LYMPHOCYTES NFR BLD: 18.5 % (ref 18–48)
MCH RBC QN AUTO: 33.2 PG (ref 27–31)
MCHC RBC AUTO-ENTMCNC: 34.6 G/DL (ref 32–36)
MCV RBC AUTO: 96 FL (ref 82–98)
MONOCYTES # BLD AUTO: 0.5 K/UL (ref 0.3–1)
MONOCYTES NFR BLD: 15.6 % (ref 4–15)
NEUTROPHILS # BLD AUTO: 2 K/UL (ref 1.8–7.7)
NEUTROPHILS NFR BLD: 64.6 % (ref 38–73)
NRBC BLD-RTO: 0 /100 WBC
PLATELET # BLD AUTO: 195 K/UL (ref 150–450)
PMV BLD AUTO: 10.2 FL (ref 9.2–12.9)
POTASSIUM SERPL-SCNC: 4.1 MMOL/L (ref 3.5–5.1)
PROT SERPL-MCNC: 7.9 G/DL (ref 6–8.4)
RBC # BLD AUTO: 2.8 M/UL (ref 4.6–6.2)
SODIUM SERPL-SCNC: 132 MMOL/L (ref 136–145)
WBC # BLD AUTO: 3.02 K/UL (ref 3.9–12.7)

## 2023-03-17 PROCEDURE — 36415 COLL VENOUS BLD VENIPUNCTURE: CPT | Performed by: NURSE PRACTITIONER

## 2023-03-17 PROCEDURE — 85025 COMPLETE CBC W/AUTO DIFF WBC: CPT | Performed by: NURSE PRACTITIONER

## 2023-03-17 PROCEDURE — 80053 COMPREHEN METABOLIC PANEL: CPT | Performed by: NURSE PRACTITIONER

## 2023-03-20 ENCOUNTER — HOSPITAL ENCOUNTER (OUTPATIENT)
Dept: RADIOLOGY | Facility: HOSPITAL | Age: 73
Discharge: HOME | End: 2023-03-20
Attending: NURSE PRACTITIONER
Payer: MEDICARE

## 2023-03-20 ENCOUNTER — OFFICE VISIT (OUTPATIENT)
Dept: HEMATOLOGY/ONCOLOGY | Facility: CLINIC | Age: 73
End: 2023-03-20
Payer: MEDICARE

## 2023-03-20 VITALS
WEIGHT: 130.38 LBS | RESPIRATION RATE: 18 BRPM | SYSTOLIC BLOOD PRESSURE: 92 MMHG | BODY MASS INDEX: 20.95 KG/M2 | TEMPERATURE: 98 F | HEART RATE: 87 BPM | DIASTOLIC BLOOD PRESSURE: 52 MMHG | HEIGHT: 66 IN

## 2023-03-20 DIAGNOSIS — R05.9 COUGH, UNSPECIFIED TYPE: ICD-10-CM

## 2023-03-20 DIAGNOSIS — C32.9 LARYNGEAL CANCER: Primary | ICD-10-CM

## 2023-03-20 DIAGNOSIS — R74.8 ELEVATED ALKALINE PHOSPHATASE LEVEL: ICD-10-CM

## 2023-03-20 PROCEDURE — 3074F PR MOST RECENT SYSTOLIC BLOOD PRESSURE < 130 MM HG: ICD-10-PCS | Mod: CPTII,S$GLB,, | Performed by: NURSE PRACTITIONER

## 2023-03-20 PROCEDURE — 3078F PR MOST RECENT DIASTOLIC BLOOD PRESSURE < 80 MM HG: ICD-10-PCS | Mod: CPTII,S$GLB,, | Performed by: NURSE PRACTITIONER

## 2023-03-20 PROCEDURE — 1159F PR MEDICATION LIST DOCUMENTED IN MEDICAL RECORD: ICD-10-PCS | Mod: CPTII,S$GLB,, | Performed by: NURSE PRACTITIONER

## 2023-03-20 PROCEDURE — 99214 OFFICE O/P EST MOD 30 MIN: CPT | Mod: S$GLB,,, | Performed by: NURSE PRACTITIONER

## 2023-03-20 PROCEDURE — 3288F PR FALLS RISK ASSESSMENT DOCUMENTED: ICD-10-PCS | Mod: CPTII,S$GLB,, | Performed by: NURSE PRACTITIONER

## 2023-03-20 PROCEDURE — 3008F PR BODY MASS INDEX (BMI) DOCUMENTED: ICD-10-PCS | Mod: CPTII,S$GLB,, | Performed by: NURSE PRACTITIONER

## 2023-03-20 PROCEDURE — 3288F FALL RISK ASSESSMENT DOCD: CPT | Mod: CPTII,S$GLB,, | Performed by: NURSE PRACTITIONER

## 2023-03-20 PROCEDURE — 99214 PR OFFICE/OUTPT VISIT, EST, LEVL IV, 30-39 MIN: ICD-10-PCS | Mod: S$GLB,,, | Performed by: NURSE PRACTITIONER

## 2023-03-20 PROCEDURE — 1126F PR PAIN SEVERITY QUANTIFIED, NO PAIN PRESENT: ICD-10-PCS | Mod: CPTII,S$GLB,, | Performed by: NURSE PRACTITIONER

## 2023-03-20 PROCEDURE — 3008F BODY MASS INDEX DOCD: CPT | Mod: CPTII,S$GLB,, | Performed by: NURSE PRACTITIONER

## 2023-03-20 PROCEDURE — 1101F PT FALLS ASSESS-DOCD LE1/YR: CPT | Mod: CPTII,S$GLB,, | Performed by: NURSE PRACTITIONER

## 2023-03-20 PROCEDURE — 1126F AMNT PAIN NOTED NONE PRSNT: CPT | Mod: CPTII,S$GLB,, | Performed by: NURSE PRACTITIONER

## 2023-03-20 PROCEDURE — 71046 X-RAY EXAM CHEST 2 VIEWS: CPT | Mod: TC,PO

## 2023-03-20 PROCEDURE — 3078F DIAST BP <80 MM HG: CPT | Mod: CPTII,S$GLB,, | Performed by: NURSE PRACTITIONER

## 2023-03-20 PROCEDURE — 3074F SYST BP LT 130 MM HG: CPT | Mod: CPTII,S$GLB,, | Performed by: NURSE PRACTITIONER

## 2023-03-20 PROCEDURE — 1101F PR PT FALLS ASSESS DOC 0-1 FALLS W/OUT INJ PAST YR: ICD-10-PCS | Mod: CPTII,S$GLB,, | Performed by: NURSE PRACTITIONER

## 2023-03-20 PROCEDURE — 1159F MED LIST DOCD IN RCRD: CPT | Mod: CPTII,S$GLB,, | Performed by: NURSE PRACTITIONER

## 2023-03-20 NOTE — PROGRESS NOTES
[facility] Hematology/Oncology  PROGRESS NOTE -  Follow-up Visit      Subjective:       Patient ID:   NAME: Cyrus Batres Jr. : 1950     72 y.o. 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  The patient is here today to go over the results of the recently ordered labs, tests and studies. He is here by himself.    He completed the recommended radiation and chemo regimen last week.   He is doing well, he states he notices that he is having some right back pain that started yesterday. He rates it a 3/10 pain.     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 He had his portacath removed on  and replaced on 2023 by Dr Le    He is breathing ok. No HA's or CP; no pain at this time        Discussed covid19 and he has been vaccinated      ROS:   GEN: normal without any fever, night sweats or weight loss; doing well with tube feeds   HEENT: normal with no HA's, sore throat, stiff neck, changes in vision  CV: normal with no CP, SOB, PND, GRIFFIN or orthopnea  PULM: normal with no SOB, cough, hemoptysis, sputum or pleuritic pain  GI: no current N/V  ; has peg tube;    : normal with no hematuria, dysuria  BREAST: normal with no mass, discharge, pain  SKIN: normal with no rash, erythema, bruising, or swelling     Answers submitted by the patient for this visit:  Review of Systems  Questionnaire (Submitted on 3/19/2023)  appetite change : No  unexpected weight change: No  mouth sores: No  visual disturbance: No  cough: Yes  shortness of breath: Yes  chest pain: No  abdominal pain: No  diarrhea: No  frequency: No  back pain: Yes  rash: No  headaches: No  adenopathy: No  nervous/ anxious: No    Pain Scale:  3/10 - back     Allergies:  Review of patient's allergies indicates:   Allergen Reactions    Lovastatin Itching    Pollen extracts     Lovastatin Rash     Not confirmed but pt skeptical       Medications:    Current Outpatient Medications:     acetaminophen (TYLENOL) 325 MG tablet, Take 325 mg by mouth every 6 (six) hours as needed for Pain., Disp: , Rfl:     calcitrioL (ROCALTROL) 1 mcg/mL solution, Take 0.25 mLs (0.25 mcg total) by Per G Tube route once daily., Disp: 15 mL, Rfl: 12    calcium carbonate 500 mg/5 mL (1,250 mg/5 mL), 10 mLs (1,000 mg total) by Per G Tube route 4 (four) times daily with meals and nightly., Disp: 473 mL, Rfl: 12    esomeprazole (NEXIUM) 40 MG capsule, 40 mg before breakfast., Disp: , Rfl:     famotidine (PEPCID) 40 mg/5 mL (8 mg/mL) suspension, Give 2.5 mLs (20 mg total) by Per G Tube route 2 (two) times daily., Disp: 50 mL, Rfl: 11    ibuprofen (ADVIL,MOTRIN) 200 MG tablet, Take 200 mg by mouth every 6 (six) hours as needed for Pain., Disp: , Rfl:     lactose-reduced food with fibr (JEVITY 1.5 TRISHA) 0.06 gram-1.5 kcal/mL Liqd, 6 cartons per day via peg.  Flush 60ml before and after each bolus, Disp: 180 each, Rfl: 11    levothyroxine (SYNTHROID) 100 MCG tablet, 1 tablet (100 mcg total) by Per G Tube route before breakfast., Disp: 30 tablet, Rfl: 11    losartan (COZAAR) 100 MG tablet, Take 0.5 tablets (50 mg total) by mouth once daily. (Patient taking differently: Take 50 mg by mouth every evening.), Disp: 45 tablet, Rfl: 3    metFORMIN (GLUCOPHAGE) 500 MG tablet, Take 1 tablet (500 mg total) by mouth 2 (two) times daily with meals., Disp: 60 tablet, Rfl: 3     "promethazine (PHENERGAN) 25 MG tablet, Take 1 tablet (25 mg total) by mouth every 4 to 6 hours as needed for Nausea., Disp: 30 tablet, Rfl: 2    traZODone (DESYREL) 50 MG tablet, TAKE 1 TABLET BY MOUTH NIGHTLY AS NEEDED FOR INSOMNIA., Disp: 90 tablet, Rfl: 1    calcium carbonate (TUMS) 200 mg calcium (500 mg) chewable tablet, 2 tablets (1,000 mg total) by Per G Tube route 5 (five) times daily. (Patient not taking: Reported on 3/7/2023), Disp: 300 tablet, Rfl: 11    zolpidem (AMBIEN) 5 MG Tab, 1 tablet (5 mg total) by Per G Tube route nightly as needed (difficult with sleep)., Disp: 30 tablet, Rfl: 0    PMHx/PSHx Updates:  See patient's last visit with me on 2/22/2023  See H&P on 9/23/2022        Pathology:   Cancer Staging   Larynx cancer  Staging form: Larynx - Glottis, AJCC 8th Edition  - Clinical stage from 8/6/2020: Stage II (cT2, cN0, cM0) - Signed by Fariha Muñoz NP on 8/6/2020  - Pathologic stage from 1/20/2022: Stage LOU (pT4a, pN0, cM0) - Signed by Fariha Muñoz NP on 1/20/2022  Staging form: Pharynx - P16 Negative Oropharynx, AJCC 8th Edition  - Clinical: Stage II (rcT2, cN0, cM0) - Signed by Matheus Portillo Jr., MD on 5/30/2022    Malignant neoplasm of base of tongue  Staging form: Pharynx - P16 Negative Oropharynx, AJCC 8th Edition  - Clinical stage from 5/20/2022: Stage II (cT2, cN0, cM0, p16-) - Signed by Saúl Kessler MD on 7/7/2022    Dissection 11/9/2022:    Final Pathologic Diagnosis 1. "left submandibular lymph node", dissection:       - Three lymph nodes, negative for carcinoma (0/3)   2. 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       - Tumor size: 4.5 cm       - Resection margins: left superior pharyngeal margin focally involved;   all other margins are negative for carcinoma       - Left internal jugular vein: free of tumor       - One lymph node, negative for carcinoma (0/1) "   Note: P16 immunostain is negative     Tumor Site       Oropharynx  involving Base of tongue       Tumor Laterality       Midline       Tumor Size       Greatest Dimension (Centimeters) 4.5 cm         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)       Histologic Grade       G2 to G3: Moderate to Poorly differentiated       Lymphovascular Invasion       Not identified       Perineural Invasion       Not identified     MARGINS      Margins       Involved by invasive tumor     Margin(s)   Involved by Invasive Tumor:      left superior pharyngeal margin; all other   margins are free of tumor     LYMPH NODES    Regional Lymph Node Status:    :     Regional Lymph Node   Status:      All regional lymph nodes negative for tumor      pT Category:               pT3   pN Category:               pN0      Base of Tongue Biopsy  4/27/2022:  Final Pathologic Diagnosis BIOPSY OF BASE OF THE TONGUE:   THE INFILTRATING POORLY DIFFERENTIATED SQUAMOUS CELL CARCINOMA   THE TUMOR IS P16 NEGATIVE.  THE POSITIVE AND NEGATIVE CONTROLS STAINED   APPROPRIATELY      Larynx resection/thyroidectomy 1/6/2022:     Final Pathologic Diagnosis 1. Lymph nodes, left neck levels 2,  3 and 4, dissection:   - Nineteen lymph nodes, all  negative  for metastatic carcinoma (0/19)   2. Lymph nodes, right neck levels 2,  3 and 4, dissection:   - Twenty-eight lymph nodes, all  negative  for metastatic carcinoma (0/28)   3. Possible parathyroid gland, excision:   - Benign parathyroid gland tissue (0.003 g)   4. Larynx, thyroid and bilateral level 6 lymph nodes, total laryngectomy,   total thyroidectomy and bilateral level 6 lymph node dissection:   - Invasive, well to moderately differentiated, keratinizing squamous cell   carcinoma (4.2 cm)   - Left lobe of thyroid gland,  positive  for invasive squamous cell carcinoma   - Margins  negative  for invasive carcinoma or dysplasia   - Three lymph nodes,  negative  for metastatic carcinoma (0/3)  "  - See synoptic report below for details and complete pathologic staging   5. Soft tissue, posterior tracheal margin, excision:   - Benign, focally reactive respiratory mucosa with submucosal acute   inflammation,  negative  for dysplasia or malignancy   6. Soft tissue, anterior tracheal margin, excision:   - Benign respiratory mucosa with subepithelial cartilage,  negative  for   dysplasia or malignancy   7. Soft tissue, posterior tracheal margin #2, excision:   - Benign, focally reactive respiratory mucosa with submucosal acute   inflammation,  negative  for dysplasia or malignancy   8. Soft tissue, anterior tracheal margin #2, excision:   - Benign, reactive focally ulcerated respiratory mucosa with submucosal acute   inflammation,  negative  for dysplasia or malignancy             Laryngoscope 8/4/2020: (with Dr Noel):  Final Pathologic Diagnosis 1.  Left true vocal fold, biopsy:       -  Invasive moderately differentiated squamous cell carcinoma,   keratinizing type   2.  Left true vocal fold, biopsy:       -  Invasive moderately differentiated squamous cell carcinoma,   keratinizing type             Laryngoscope 3/17/2020 (with Dr Xiao):  Final Pathologic Diagnosis 1.  BIOPSY OF LEFT TRUE VOCAL CORD:   SEVERELY DYSPLASTIC APPEARING SQUAMOUS MUCOSA   INVASIVE CARCINOMA IS NOT DOCUMENTED   ON THE OTHER HAND, THESE FRAGMENTS ARE NODUL AND WITHOUT SUBMUCOSA FOR   EVALUATION; IT IS POSSIBLE THAT THEY REFLECT INVASIVE SQUAMOUS CARCINOMA   2.  BIOPSY OF LEFT ANTERIOR COMMISSURE:   MODERATE DYSPLASIA   NO INVASIVE CARCINOMA IDENTIFIED             Objective:     Vitals:  Blood pressure (!) 92/52, pulse 87, temperature 97.8 °F (36.6 °C), resp. rate 18, height 5' 6" (1.676 m), weight 59.1 kg (130 lb 6.4 oz).    Physical Examination:   GEN: no apparent distress, comfortable; AAOx3  HEAD: atraumatic and normocephalic  EYES: no pallor, no icterus, PERRLA  ENT: OMM, no pharyngeal erythema, external ears WNL; no nasal " discharge; no thrush; s/p laryngectomy with flap since healed well; trach   NECK: no masses, thyroid normal, trachea midline, no LAD/LN's, supple; post-op dissection healed well;    CV: RRR with no murmur; normal pulse; normal S1 and S2; no pedal edema; portacath   CHEST: Normal respiratory effort; CTAB; normal breath sounds; no wheeze or crackles  ABDOM: nontender and nondistended; soft; normal bowel sounds; no rebound/guarding; peg tube  MUSC/Skeletal: ROM normal; no crepitus; joints normal; no deformities or arthropathy  EXTREM: no clubbing, cyanosis, inflammation or swelling  SKIN: no rashes, lesions, ulcers, petechiae or subcutaneous nodules  : no mahan  NEURO: grossly intact; motor/sensory WNL; AAOx3; no tremors  PSYCH: normal mood, affect and behavior  LYMPH: normal cervical, supraclavicular, axillary and groin LN's          Labs:     Lab Results   Component Value Date    WBC 3.02 (L) 03/17/2023    HGB 9.3 (L) 03/17/2023    HCT 26.9 (L) 03/17/2023    MCV 96 03/17/2023     03/17/2023     CMP  Sodium   Date Value Ref Range Status   03/17/2023 132 (L) 136 - 145 mmol/L Final     Potassium   Date Value Ref Range Status   03/17/2023 4.1 3.5 - 5.1 mmol/L Final     Chloride   Date Value Ref Range Status   03/17/2023 99 95 - 110 mmol/L Final     CO2   Date Value Ref Range Status   03/17/2023 24 23 - 29 mmol/L Final     Glucose   Date Value Ref Range Status   03/17/2023 117 (H) 70 - 110 mg/dL Final     BUN   Date Value Ref Range Status   03/17/2023 20 8 - 23 mg/dL Final     Creatinine   Date Value Ref Range Status   03/17/2023 0.9 0.5 - 1.4 mg/dL Final     Calcium   Date Value Ref Range Status   03/17/2023 8.7 8.7 - 10.5 mg/dL Final     Total Protein   Date Value Ref Range Status   03/17/2023 7.9 6.0 - 8.4 g/dL Final     Albumin   Date Value Ref Range Status   03/17/2023 4.5 3.5 - 5.2 g/dL Final   11/18/2020 3.7 3.6 - 5.1 g/dL Final     Comment:     For additional information, please refer to    http://education.[employer]/faq/IOP746 (This link is   being provided for informational/ educational purposes only.)  This test was developed and its analytical performance   characteristics have been determined by [facility]. It has not been cleared or approved by the   [place] Food and Drug Administration. This assay has been validated   pursuant to the CLIA regulations and is used for clinical   purposes.  @ Test Performed By:  [facility]  Yo Cortes M.D.,   [address] [place] [id number]  [facility]  [id number]       Total Bilirubin   Date Value Ref Range Status   03/17/2023 1.0 0.1 - 1.0 mg/dL Final     Comment:     For infants and newborns, interpretation of results should be based  on gestational age, weight and in agreement with clinical  observations.    Premature Infant recommended reference ranges:  Up to 24 hours.............<8.0 mg/dL  Up to 48 hours............<12.0 mg/dL  3-5 days..................<15.0 mg/dL  6-29 days.................<15.0 mg/dL       Alkaline Phosphatase   Date Value Ref Range Status   03/17/2023 478 (H) 55 - 135 U/L Final     AST   Date Value Ref Range Status   03/17/2023 34 10 - 40 U/L Final     ALT   Date Value Ref Range Status   03/17/2023 50 (H) 10 - 44 U/L Final     Anion Gap   Date Value Ref Range Status   03/17/2023 9 8 - 16 mmol/L Final     eGFR if    Date Value Ref Range Status   07/29/2022 >60.0 >60 mL/min/1.73 m^2 Final     eGFR if non    Date Value Ref Range Status   07/29/2022 >60.0 >60 mL/min/1.73 m^2 Final     Comment:     Calculation used to obtain the estimated glomerular filtration  rate (eGFR) is the CKD-EPI equation.            Radiology/Diagnostic Studies:      CTA Neck    Result Date: 9/20/2022  EXAMINATION: CTA NECK CLINICAL HISTORY: Head/neck cancer, monitor;Malignant neoplasm of base of tongue TECHNIQUE: CT angiogram was  performed from the level of the scott to the EAC following the IV administration of 75mL of Omnipaque 350.   Sagittal and coronal reconstructions and maximum intensity projection reconstructions were performed. Arterial stenosis percentages are based on NASCET measurement criteria. COMPARISON: CTA neck dated 05/20/2022 FINDINGS: Aortic arch and great vessels: There is a conventional left-sided 3 vessel arch.  The aortic arch is widely patent.  There is stable soft and calcified plaque in the proximal left subclavian artery with a similar appearance the prior study and possibly within radiation field but without critical stenosis or occlusion.  The right subclavian artery is patent.  The brachiocephalic trunk and origin of the left common carotid artery are patent. Carotid arteries Right carotid artery: There is calcified plaque scattered in the right common carotid artery without critical stenosis, occlusion or change.  There is no measurable stenosis of the internal carotid artery which is patent to the skull base. Left carotid artery: There is mild plaque scattered in the left common carotid artery without change and without critical stenosis or occlusion.  There is no measurable stenosis of the internal carotid artery. Vertebral arteries: The left vertebral artery is dominant and patent throughout its course in the neck.  The right vertebral artery is hypoplastic but patent.  There is no critical stenosis, occlusion, thrombus or dissection.  There is no change. The study extends intracranially.  There is calcified plaque in the V4 segment of the left vertebral artery resulting in a moderate to marked short segment nonocclusive stenosis.  The right vertebral artery partially terminates in a posteroinferior cerebellar artery with a short segment moderate to marked stenosis of the very distal right vertebral artery.  Some flow within the distal right vertebral artery may represent retrograde flow from the dominant  left vertebral artery.  The basilar artery is patent.  The origins of the superior cerebellar and posterior cerebral artery on the right are patent.  There is fetal origin of the left posterior cerebral artery which is patent. There is plaque in the cavernous segments of both internal carotid arteries but there is no critical stenosis or large vessel occlusion of the anterior circulation vessels.  There is no large aneurysm. Other: There is a similar appearance of the included upper lungs with pleural and parenchymal changes anteriorly in the upper lobes bilaterally.  As previously discussed, timing of the contrast bolus is performed during the arterial phase for CTA neck.  Therefore, enhancement of the soft tissues is somewhat suboptimal due to this technique. There has been a large region of soft tissue resection in the anterior mid and lower neck soft tissues with continued open defect in the upper chest and lower neck above the manubrium.  Soft tissue seen along the left posterolateral border of the trachea could represent secretions or mucus.  This was present previously. Redemonstrated is a large heterogeneously enhancing lesion centered at the level of the tongue base and floor of the mouth centrally into the left.  There is scattered calcifications.  The prior CTA demonstrated regions of central necrosis which are no longer definitely present but diffusely heterogeneous density and enhancement likely represents regions of necrosis.  This large heterogeneously enhancing soft tissue mass extends more anteriorly in the floor of the mouth on the left and measures at least 5.6 cm in greatest anterior to posterior dimension with 3.6 cm transverse dimension.  This does extend anteriorly to the medial margin of the body of the mandible on the left. There are post treatment changes with stranding in the neck fat.     1. Similar appearance of the neck vessels when compared to the prior CTA neck with mild narrowing at  the origin of the left subclavian artery.  There is no critical stenosis, occlusion, thrombosis or dissection involving the cervical vertebral or carotid arteries. 2. Larger mass within the hypopharynx and tongue base extending anteriorly to the floor of the mouth on the left to the medial margin of the body of the mandible without obvious bony destruction. 3. There is nonocclusive stenosis of the distal V4 segment of the left vertebral artery without change. 4. There is no large vessel occlusion or critical stenosis of the anterior circulation. 5. There is developmental variation with fetal origin of the left posterior cerebral artery. Electronically signed by: Rex Joyner MD Date:    09/20/2022 Time:    11:31    CT Abdomen Pelvis With Contrast    Result Date: 9/1/2022  CMS MANDATED QUALITY DATA - CT RADIATION - 436 All CT scans at this facility utilize dose modulation, iterative reconstruction, and/or weight based dosing when appropriate to reduce radiation dose to as low as reasonably achievable. Reason: abdominal pain partial history of laryngeal carcinoma TECHNIQUE: CT abdomen and pelvis with 100 mL Omnipaque 350. COMPARISON: PET/CT 5/13/2022 CT ABDOMEN: Coronary artery calcification and extremely tiny hiatal hernia incidentally noted. Visualized lung bases are clear. Liver, gallbladder, pancreas, spleen, adrenals, and kidneys are normal. Mild aortoiliac calcifications present. Gastrostomy tube tip lies in distal gastric body. Small intestines are unremarkable. Mild wall thickening affects the ascending colon with pneumatosis intestinalis diffusely affecting the ascending colon. No other free intraperitoneal gas or, and remainder of colon is normal. A normal appendix is present. The major mesenteric vascular structures are patent. No acute osseous abnormality. CT PELVIS: Prostate is slightly enlarged. Bladder is normal. No free pelvic fluid. Tiny right and small to moderate left fat-containing inguinal  hernias are present. No acute osseous abnormality. IMPRESSION: 1. Pneumatosis intestinalis affecting the ascending colon with associated mild wall thickening. Etiology of this is undetermined. Potential considerations include intestinal ischemia or pneumatosis related to chemotherapy. Clinical and laboratory correlation is needed to assess significance. No portal venous gas or free intraperitoneal air however. 2. Coronary artery calcifications Electronically signed by:  Nael Hui MD  9/1/2022 6:20 PM CDT Workstation: [id number]    NM PET CT Routine Skull to Mid Thigh    Result Date: 9/27/2022  PET CT WITH IMAGE FUSION HISTORY:  restage tongue cancer RESTAGING...  HX: TONGUE CA.  DX: April 2022.  LAST CHEMO TREATMENT WAS 3WKS AGO.  EVALUATE TX RESPONSE.   ALSO HX OF LARYNGEAL CA IN AUGUST 2020.  MULTIPLE SURGERIES: LARYNGECTOMY, THYROIDECTOMY & TRACHEOSTOMY.  RAD TX WAS COMPLETED IN NOV 2020. TECHNIQUE: Following IV administration of 11.2 mCi of F-18 labeled FDG into right antecubital fossa and a 60 minute delay, PET CT was performed from the vertex of the skull through the proximal thighs with an integrated PET CT scanner with image fusion. CT images were obtained to aid in attenuation correction and PET localization. The patient's serum glucose at the time of the exam was 136 mg/dL. COMPARISON: PET/CT 5/13/2022 FINDINGS: Poorly characterized soft tissue mass involving base of tongue approximately measures 4.3 x 2.8 cm (series 3 image 65) appearing similar to the prior exam. This reaches current max SUV of 11.8, not significantly changed from prior of 11.4. No other abnormal FDG activity. Intracranial compartment is unremarkable. Trace bilateral maxillary sinus mucosal thickening is present. Postoperative changes of laryngectomy and tracheostomy are present. Surgical clips occur throughout the neck. No definite enlarged cervical or supraclavicular lymph node, with evaluation limited by lack of IV contrast. Left  subclavian port catheter tip terminates in SVC. Diffuse coronary artery calcifications are present. No enlarged mediastinal or axillary lymph nodes. No pulmonary nodule or mass. Gastrostomy tube tip lies in gastric body. Moderate aortoiliac calcifications are present. Small fat-containing left inguinal hernia incidentally noted. Mild degenerative changes affect the spine. No suspicious osseous abnormality. IMPRESSION: 1. No significant change in the FDG avid mass involving the tongue base, remaining characteristic of malignancy. 2. No new FDG avid malignancy or metastatic disease. Electronically signed by:  Nael Hui MD  9/27/2022 2:38 PM CDT Workstation: [id number]    CT Abdomen Pelvis  Without Contrast    Result Date: 9/4/2022  CMS MANDATED QUALITY DATA - CT RADIATION  436 All CT scans at this facility utilize dose modulation, iterative reconstruction, and/or weight based dosing when appropriate to reduce radiation dose to as low as reasonably achievable. CT ABDOMEN PELVIS WITHOUT IV CONTRAST CLINICAL HISTORY: 72 years Male Follow-up pneumatosis COMPARISON: CT abdomen and pelvis September 1, 2022 FINDINGS: Imaging through the lower thorax demonstrates subsegmental atelectasis of the dependent lower lobes. Coronary artery calcification. Bone window images show no acute or aggressive osseous abnormality. Transitional lumbosacral vertebral body. On this unenhanced exam, no focal hepatic lesion. Gallbladder and biliary tree are unremarkable. Spleen appears normal. Pancreas is unremarkable. No adrenal lesion. No renal calculi or hydronephrosis. Ureters are normal in caliber. Urinary bladder is within normal limits. Gastrostomy tube is in place within the stomach. Stomach is largely collapsed. No evidence of small bowel obstruction. Pneumatosis and pericolonic abscess involving the ascending colon is redemonstrated, slightly diminished compared to prior. Mild wall thickening throughout the colon. No free fluid or  free air within the abdomen or pelvis. Aortoiliac atherosclerotic calcification. No pathologically enlarged lymph nodes within the abdomen or pelvis. Bilateral fat-containing inguinal hernias, larger on the left. IMPRESSION: Pneumatosis and pericolonic gas about the ascending colon has decreased in volume compared to prior. Persistent colonic wall thickening which could indicate colitis. Coronary and aortic atherosclerosis. Gastrostomy tube is within the stomach. Bilateral inguinal hernias. Electronically signed by:  Jose De Jesus Oleary MD  9/4/2022 4:06 PM CDT Workstation: [id number]    CTA neck  5/20/2022:     IMPRESSION:  Persistent mass within the hypopharynx consistent with malignancy.  By my measurements it is larger than on April 24, 2022 with central necrosis.  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 This is similar to the previous April 2022 study.  No evidence of arterial compromise related to malignancy.     PET 5/13/2022:     IMPRESSION:  1. Postoperative changes of prior laryngectomy and lymph node resection/radiation.  2. Masslike FDG avid lesion to the left of midline at the tongue base concerning for residual/recurrent disease.  3. Adjacent confluent nodular extension along the inferior left side of the mass.  4. No FDG avid lymphadenopathy or convincing additional sites of disease in the chest, abdomen or pelvis.     CT head 5/10/2022:  FINDINGS: Comparison to multiple prior exams. There is no acute intracranial hemorrhage, with no mass effect or abnormal extra-axial fluid. Mild scattered areas of nonspecific hypoattenuation involve the deep periventricular white matter, with gray-white differentiation maintained.     There is mild generalized prominence of the cortical sulci and ventricles. The cerebellum and brainstem are unremarkable. There are carotid siphon vascular calcifications. The visualized paranasal sinuses and mastoid air cells  are clear. There is no acute calvarial fracture or scalp hematoma.     IMPRESSION: No acute intracranial hemorrhage or acute calvarial fracture     CT soft neck 4/24/2022;     Oral tongue/tongue base:  At the base of the tongue on the left there are findings of a heterogeneously enhancing mass measuring 2.1 cm highly concerning for a neoplastic process.     True and false cords:  Patient status post laryngectomy which has been performed in the interim. Soft tissue stranding in the lower neck likely related to a previous flat reconstruction. No enhancement or lymphadenopathy within the lower neck.     Lymph node assessment:Negative     Surrounding soft tissues:  Negative     Vasculature:  Negative     Osseous structures:  Negative     Lung apices:  Scarring involving the lung apices bilaterally likely related to previous radiation.     IMPRESSION:  1. Postoperative and radiation changes involving the lower neck.  2. Findings highly concerning for a developing mass at the left base of the tongue enhancing 2.1 cm region. Direct visualization in this region would be of benefit.        CT soft neck  12/24/2021  IMPRESSION:  1.  Laryngeal mass as on prior exam. Laryngeal airway narrowing has not changed.  2.  No evidence for active hemorrhage.  3.  Partially visualized patchy groundglass infiltrates in both upper lobes. Also see CT chest report     CTA Chest 12/24/2021:  IMPRESSION:     1.  No pulmonary embolism.     2.  Soft tissue stranding in the region of the strap musculature with ill-defined hypodensity measuring 2.8 x 1.4 cm in the cervical midline.  Findings concerning for infectious process or abscess. Neoplastic process could have similar imaging characteristics.     3.  Suggested erosion of the proximal right parasymphyseal aspect of the thyroid shield.  Correlated with CT soft tissue neck findings. Findings could represent osteomyelitis. Neoplastic process cannot be excluded.     4.  Hepatic steatosis.  5.   Thickening of the gastric wall. Prominence of perigastric vasculature. Small hiatal hernia.     6.  Moderate stool burden.  Correlate for constipation.        PET 12/15/2021:  IMPRESSION:     Substantial qualitative and quantitative increase of hypermetabolic FDG activity associated with the larynx in this patient with known supraglottic neoplasm.     No evidence of FDG avid metastatic disease involving neck, chest, abdomen or pelvis.     PET 8/21/2020:     Impression:     1. Intensely hypermetabolic plaque-like mass along the left true vocal cord, compatible with known laryngeal carcinoma.  2. No findings of regional metastatic disease in the neck, or distant metastatic disease.        CT Chest 8/10/2020:     IMPRESSION:     No metastatic disease within the chest.  Three-vessel coronary artery calcification. Nondilated cardiac  chambers     CT Soft neck 7/22/2020:  IMPRESSION:  1. Slight interval increase in size of the previously described  enhancing nodule along the anterior left vocal cord.  2. No pathologic lymphadenopathy.  3. Additional and incidental findings as noted above.     CT Soft neck  3/16/2020:     Impression:     6 mm enhancing focus involving the superior aspect of the left focal cord near the anterior commisure.  Recommend direct visualization for further evaluation of laryngeal polyp     Question of 2 mm submucosal lipoma involving the midportion of the superior aspect of the left vocal cord.     Mild arteriosclerosis involving the brachiocephalic arteries and carotid arteries     Mild degenerative change of the cervical spine        I have reviewed all available lab results and radiology reports.    Assessment/Plan:   (1) 72 y.o. 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     - Patient has [facility] under [facility] and Dr Portillo with  rad/onc.   - He was recently found to have a new primary cancer involving the base of his tongue in April 2022. He was deemed not to be a candidate for any further radiation and did not want to undergo any further surgery as this would necessitate a glossectomy procedure.   - He has been on adjuvant chemotherapy per direction of Dr Khoobehi and has had 3 cycles. His therapy course has been complicated with persistent diarrhea and N/V.      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 He saw Dr Lucero with Rad/onc this am. They are going to present his case to H&N Tumor Board at [facility] [place] and plans to see Dr James about surgical options. If he is not a surgical candidate then will proceed with cisplatin and XRT combine therapy.     10/6/2022:  - He saw Dr Lucero with Rad/onc, and Dr James and his case was presented to H&N Tumor Board at [facility] [place] and  he general consensus was to proceed to surgery.  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   - He had his portacath removed on 11/28.   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, keratinizing type, moderate  to poorly differentiated tumor  - Four LN's were all negative  - he did have a positive left superior pharyngeal margin  - pT3 pN0  - reviewed the latest NCCN guidelines again from Version 1.2023; he may need some further systemic therapy due to the margin  - check on Rad/onc follow-up     1/23/2023:  - s/p new portacath placed on 1/12/2023 with Dr Le  - starting combined chemotherapy and XRT - cisplatin  - s/p chemotherapy school with [facility]    2/6/2023:  - he seems to be tolerating the chemotherapy well at this time  - continued with concomitant therapy with XRT    2/22/2023:  - he seems to be tolerating the chemotherapy well at this time  - continued with concomitant therapy with XRT  - labs relatively adequate  - will check on his refills    3/6/2023:  - finishing up XRT this comking Thursday  - last chemotherapy today    3/20/2023:   - completed XRT and Chemotherapy last week   -doing well   - CXR due to SOB and cough   - will check Gamma GT level due to continuous elevated alkaline phos   - repeat scans per Rad/onc due to recent radiation      (2) Hx/of Laryngeal cancer in Aug 2020 stage II (cT2 N0)  - s/p radiation followed by bilateral neck dissection and total thyroidectomy     Brief Oncology Summary for his laryngeal CA hx:     Patient was noted to initially have a suspicious lesion on the left true vocal cord by laryngoscopy with Dr Xiao in March 2020 with biopsy showing severe dysplastic changes without definitive invasive process. He had a CT scan on 3/16/2020 which showed a 6mm nodule on the left vocal cord. A repeat Ct scan four months later on 7/22/2020 showed the nodule enlarged to 1.4 x 1.1 cm in size. Patient was then seen by Dr Noel and underwent repeat scope in Aug 2020 who found a bulky deeply invading tumor which was debulked and the pathology coming back moderately differentiated SCCA without lymphovascular or perineural invasion. Hs case was subsequently presented to the tumor board  and the consensus was to proceed with radiation. He completed XRT on 10/30/2020 and proceeded with regular laryngoscopy surveillance. He eventually required salvage laryngectomy and neck dissection with flap with Dr James on Jan 6th 2022. 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 Fifty lymph nodes were removed which were all negative. Pathology TNM was pT4a, pN0. Patient did not require any adjuvant therapy afterwards.      (2) HTN and hypercholesterolemia     (3) Paroxysmal atrial fibrillation, V tach     (4) DM II     (5) B12 deficiency      (6) Fatty liver disease, GERD           VISIT DIAGNOSES:      Laryngeal cancer    Cough, unspecified type  -     X-Ray Chest PA And Lateral; Future; Expected date: 03/20/2023    Elevated alkaline phosphatase level  -     Gamma GT; Future; Expected date: 03/20/2023            PLAN:  Completed chemo and XRT last week    s/p chemotherapy school with me - discussed the side-effect profile, provided literature and obtained consents  F/u Rad/onc follow-up as directed  F/u with Dr James with ENT and Dr Lucero with rad/onc  Check labs weekly  F/u with PCP, ENT, etc  Dietician f/u on the tube feeds  Scans per rad/onc   Check gamma gt level due to elevated alk phos   CXR due to cough        RTC in 2 week with Joi and 4 weeks with me          Discussion:     COVID-19 Discussion:     I had long discussion with patient and any applicable family about the COVID-19 coronavirus epidemic and the recommended precautions with regard to cancer and/or hematology patients. I have re-iterated the CDC recommendations for adequate hand washing, use of hand -like products, and coughing into elbow, etc. In addition, especially for our patients who are on chemotherapy and/or our otherwise immunocompromised patients, I have recommended avoidance of  "crowds, including movie theaters, restaurants, churches, etc. I have recommended avoidance of any sick or symptomatic family members and/or friends. I have also recommended avoidance of any raw and unwashed food products, and general avoidance of food items that have not been prepared by themselves. The patient has been asked to call us immediately with any symptom developments, issues, questions or other general concerns.         Pathology Discussion:     I reviewed and discussed the pathology report(s) and radiograph reports (if available) in as simple to understand and/or laymen's terms to the best of my ability. I had an indepth conversation with the patient and went over the patient's individual diagnosis based on the information that was currently available. I discussed the TNM staging process with regard to the patient's particular cancer type, and the calculated stage based on the currently available TNM data and literature. I discussed the available prognostic data with regard to the current staging information and how it relates to the prognosis of their particular neoplastic process.          NCCN Guidelines:     I discussed the available treatment option(s) in accordance with the latest literature from the NCCN Clinical Practice Guidelines for the patient's particular type of cancer disorder. The NCCN Guidelines provide a "document evidence-based (and) consensus-driven management" of the care of oncology patients. The treatment recommendations were made not only in accordance to the NCCN guidelines, but also factored in to account the patient's overall age, condition, performance status and their medical co-morbidities. I went over the risks and benefits of the the treatment options (if any could be made) with regard to their particular cancer type, their cancer stage, their age, and their co-morbidities.         Chemotherapy Discussion:      I discussed the available treatment option(s) in accordance " with the latest/current national evidence-based guidelines (NCCN, UpToDate, NCI, ASCO, etc where applicable), their overall age/condition and their co-morbidities. I also went over the risks and benefits of the chemotherapy with regard to their particular cancer type, their cancer stage, their age/condition, and their co-morbidities. I provided literature on the chemotherapy regimen and discussed the chemotherapy side-effect profiles of the drug(s). I discussed the importance of compliance with obtaining and monitoring weekly lab work, and went over the potential hematopathology issues and risks with anemia, leucopenia and thrombocytopenia that can occur with chemotherapy. I discussed the potential risks of liver and kidney damage, which could be permanent and could necessitate dialysis long-term if kidney failure developed. I discussed the emetic and/or diarrheal potential of the regimen and the potential need for use of antiemetic and anti-diarrheal medications. I discussed the risk for development of anaphylactic shock, bronchospasm, dysrhythmia, and respiratory/cardiovascular arrest and/or failure. I discussed the potential risks for development of alopecia, cold sensory issues, ringing in ears, vertigo, cataracts, glaucoma, and neuropathy, all of which could end up being chronic and life-long. Some chemotherpyI discussed the risks of hand-foot syndrome and rashes, and development of other autoimmune mediated processes such as pneumonitis, hepatitis, and colitis which could be life threatening. I discussed the risks of the potential development of a rare but fatal viral mediated disease known as PML (Progressive Multifocal Leukoencephalopathy), and risk of future development of leukemia and/or lymphoma from use of certain chemotherapy agents. I discussed the need for neutropenic precautions, basic hygiene/sanitation behaviors and dietary restrictions.    The patient's consent has been obtained to proceed with  the chemotherapy.The patient will be referred to Chemotherapy School /[facility] for training and education on chemotherapy, use of antiemetics and/or anti-diarrheals, use of NSAID's, potential chemotherapy side-effects, and any specific recommendations and precautions with the particular chemotherapy agents.      I answered all of the patient's (and family's, if applicable) questions to the best of my ability and to their complete satisfaction. The patient acknowledged full understanding of the risks, recommendations and plan(s).     I have explained and the patient understands all of  the current recommendation(s). I have answered all of their questions to the best of my ability and to their complete satisfaction.          I have explained all of the above in detail and the patient understands all of the current recommendation(s). I have answered all of their questions to the best of my ability and to their complete satisfaction.   The patient is to continue with the current management plan.            Electronically signed by Briana Martinez, MSN,APRN,AGNP-C

## 2023-03-21 DIAGNOSIS — R74.8 ELEVATED ALKALINE PHOSPHATASE LEVEL: Primary | ICD-10-CM

## 2023-03-23 ENCOUNTER — OUTPATIENT CASE MANAGEMENT (OUTPATIENT)
Dept: ADMINISTRATIVE | Facility: OTHER | Age: 73
End: 2023-03-23
Payer: MEDICARE

## 2023-03-23 NOTE — PROGRESS NOTES
Outpatient Care Management  Plan of Care Follow Up Visit    Patient: Cyrus Batres Jr.  MRN: [id number]  Date of Service: 03/23/2023  Completed by: Eugenia Agudelo RN  Referral Date: 09/02/2022    Reason for Visit   Patient presents with    Update Plan Of Care       Brief Summary: Reviewed upcoming appts on 04/03/23 with Dr. Tamez, 04/04/23 ultrasound of abdomen since his alkaline phosphate levels are high, 04/04/23 Dr. Izaguirre appt.   Next Steps: Follow up in three weeks as per his request Possible closure.

## 2023-03-24 ENCOUNTER — LAB VISIT (OUTPATIENT)
Dept: LAB | Facility: HOSPITAL | Age: 73
End: 2023-03-24
Attending: NURSE PRACTITIONER
Payer: MEDICARE

## 2023-03-24 ENCOUNTER — TELEPHONE (OUTPATIENT)
Dept: INFUSION THERAPY | Facility: HOSPITAL | Age: 73
End: 2023-03-24
Payer: MEDICARE

## 2023-03-24 DIAGNOSIS — C01 MALIGNANT NEOPLASM OF BASE OF TONGUE: ICD-10-CM

## 2023-03-24 LAB
ALBUMIN SERPL BCP-MCNC: 4.2 G/DL (ref 3.5–5.2)
ALP SERPL-CCNC: 603 U/L (ref 55–135)
ALT SERPL W/O P-5'-P-CCNC: 38 U/L (ref 10–44)
ANION GAP SERPL CALC-SCNC: 11 MMOL/L (ref 8–16)
ANISOCYTOSIS BLD QL SMEAR: SLIGHT
AST SERPL-CCNC: 37 U/L (ref 10–40)
BASOPHILS NFR BLD: 1 % (ref 0–1.9)
BILIRUB SERPL-MCNC: 0.7 MG/DL (ref 0.1–1)
BUN SERPL-MCNC: 24 MG/DL (ref 8–23)
CALCIUM SERPL-MCNC: 8.4 MG/DL (ref 8.7–10.5)
CHLORIDE SERPL-SCNC: 96 MMOL/L (ref 95–110)
CO2 SERPL-SCNC: 27 MMOL/L (ref 23–29)
CREAT SERPL-MCNC: 1 MG/DL (ref 0.5–1.4)
DACRYOCYTES BLD QL SMEAR: ABNORMAL
DIFFERENTIAL METHOD: ABNORMAL
EOSINOPHIL NFR BLD: 1 % (ref 0–8)
ERYTHROCYTE [DISTWIDTH] IN BLOOD BY AUTOMATED COUNT: 16.7 % (ref 11.5–14.5)
EST. GFR  (NO RACE VARIABLE): >60 ML/MIN/1.73 M^2
GLUCOSE SERPL-MCNC: 147 MG/DL (ref 70–110)
HCT VFR BLD AUTO: 26.6 % (ref 40–54)
HGB BLD-MCNC: 8.9 G/DL (ref 14–18)
IMM GRANULOCYTES # BLD AUTO: ABNORMAL K/UL (ref 0–0.04)
IMM GRANULOCYTES NFR BLD AUTO: ABNORMAL % (ref 0–0.5)
LYMPHOCYTES NFR BLD: 12 % (ref 18–48)
MCH RBC QN AUTO: 32.6 PG (ref 27–31)
MCHC RBC AUTO-ENTMCNC: 33.5 G/DL (ref 32–36)
MCV RBC AUTO: 97 FL (ref 82–98)
MONOCYTES NFR BLD: 12 % (ref 4–15)
MYELOCYTES NFR BLD MANUAL: 2 %
NEUTROPHILS NFR BLD: 61 % (ref 38–73)
NEUTS BAND NFR BLD MANUAL: 11 %
NRBC BLD-RTO: 0 /100 WBC
PLATELET # BLD AUTO: 246 K/UL (ref 150–450)
PLATELET BLD QL SMEAR: ABNORMAL
PMV BLD AUTO: 9.7 FL (ref 9.2–12.9)
POLYCHROMASIA BLD QL SMEAR: ABNORMAL
POTASSIUM SERPL-SCNC: 4.1 MMOL/L (ref 3.5–5.1)
PROT SERPL-MCNC: 7.2 G/DL (ref 6–8.4)
RBC # BLD AUTO: 2.73 M/UL (ref 4.6–6.2)
SODIUM SERPL-SCNC: 134 MMOL/L (ref 136–145)
WBC # BLD AUTO: 3.17 K/UL (ref 3.9–12.7)

## 2023-03-24 PROCEDURE — 80053 COMPREHEN METABOLIC PANEL: CPT | Performed by: NURSE PRACTITIONER

## 2023-03-24 PROCEDURE — 85027 COMPLETE CBC AUTOMATED: CPT | Performed by: NURSE PRACTITIONER

## 2023-03-24 PROCEDURE — 36415 COLL VENOUS BLD VENIPUNCTURE: CPT | Performed by: NURSE PRACTITIONER

## 2023-03-24 PROCEDURE — 85007 BL SMEAR W/DIFF WBC COUNT: CPT | Performed by: NURSE PRACTITIONER

## 2023-03-31 ENCOUNTER — LAB VISIT (OUTPATIENT)
Dept: LAB | Facility: HOSPITAL | Age: 73
End: 2023-03-31
Attending: NURSE PRACTITIONER
Payer: MEDICARE

## 2023-03-31 ENCOUNTER — OFFICE VISIT (OUTPATIENT)
Dept: RADIATION ONCOLOGY | Facility: CLINIC | Age: 73
End: 2023-03-31
Payer: MEDICARE

## 2023-03-31 VITALS — WEIGHT: 126.88 LBS | BODY MASS INDEX: 20.48 KG/M2

## 2023-03-31 DIAGNOSIS — C32.9 LARYNX CANCER: Primary | ICD-10-CM

## 2023-03-31 DIAGNOSIS — C01 MALIGNANT NEOPLASM OF BASE OF TONGUE: ICD-10-CM

## 2023-03-31 DIAGNOSIS — C01 MALIGNANT NEOPLASM OF BASE OF TONGUE: Primary | ICD-10-CM

## 2023-03-31 LAB
ALBUMIN SERPL BCP-MCNC: 4.5 G/DL (ref 3.5–5.2)
ALP SERPL-CCNC: 447 U/L (ref 55–135)
ALT SERPL W/O P-5'-P-CCNC: 39 U/L (ref 10–44)
ANION GAP SERPL CALC-SCNC: 12 MMOL/L (ref 8–16)
AST SERPL-CCNC: 37 U/L (ref 10–40)
BASOPHILS # BLD AUTO: 0.02 K/UL (ref 0–0.2)
BASOPHILS NFR BLD: 0.5 % (ref 0–1.9)
BILIRUB SERPL-MCNC: 0.8 MG/DL (ref 0.1–1)
BUN SERPL-MCNC: 24 MG/DL (ref 8–23)
CALCIUM SERPL-MCNC: 9.2 MG/DL (ref 8.7–10.5)
CHLORIDE SERPL-SCNC: 98 MMOL/L (ref 95–110)
CO2 SERPL-SCNC: 26 MMOL/L (ref 23–29)
CREAT SERPL-MCNC: 1.1 MG/DL (ref 0.5–1.4)
DIFFERENTIAL METHOD: ABNORMAL
EOSINOPHIL # BLD AUTO: 0 K/UL (ref 0–0.5)
EOSINOPHIL NFR BLD: 0.7 % (ref 0–8)
ERYTHROCYTE [DISTWIDTH] IN BLOOD BY AUTOMATED COUNT: 17.7 % (ref 11.5–14.5)
EST. GFR  (NO RACE VARIABLE): >60 ML/MIN/1.73 M^2
GLUCOSE SERPL-MCNC: 117 MG/DL (ref 70–110)
HCT VFR BLD AUTO: 30.5 % (ref 40–54)
HGB BLD-MCNC: 10.2 G/DL (ref 14–18)
IMM GRANULOCYTES # BLD AUTO: 0.07 K/UL (ref 0–0.04)
IMM GRANULOCYTES NFR BLD AUTO: 1.6 % (ref 0–0.5)
LYMPHOCYTES # BLD AUTO: 0.9 K/UL (ref 1–4.8)
LYMPHOCYTES NFR BLD: 20.2 % (ref 18–48)
MCH RBC QN AUTO: 33.3 PG (ref 27–31)
MCHC RBC AUTO-ENTMCNC: 33.4 G/DL (ref 32–36)
MCV RBC AUTO: 100 FL (ref 82–98)
MONOCYTES # BLD AUTO: 0.5 K/UL (ref 0.3–1)
MONOCYTES NFR BLD: 11.4 % (ref 4–15)
NEUTROPHILS # BLD AUTO: 2.9 K/UL (ref 1.8–7.7)
NEUTROPHILS NFR BLD: 65.6 % (ref 38–73)
NRBC BLD-RTO: 0 /100 WBC
PLATELET # BLD AUTO: 267 K/UL (ref 150–450)
PMV BLD AUTO: 10 FL (ref 9.2–12.9)
POTASSIUM SERPL-SCNC: 4.2 MMOL/L (ref 3.5–5.1)
PROT SERPL-MCNC: 7.8 G/DL (ref 6–8.4)
RBC # BLD AUTO: 3.06 M/UL (ref 4.6–6.2)
SODIUM SERPL-SCNC: 136 MMOL/L (ref 136–145)
WBC # BLD AUTO: 4.4 K/UL (ref 3.9–12.7)

## 2023-03-31 PROCEDURE — 36415 COLL VENOUS BLD VENIPUNCTURE: CPT | Performed by: NURSE PRACTITIONER

## 2023-03-31 PROCEDURE — 80053 COMPREHEN METABOLIC PANEL: CPT | Performed by: NURSE PRACTITIONER

## 2023-03-31 PROCEDURE — 85025 COMPLETE CBC W/AUTO DIFF WBC: CPT | Performed by: NURSE PRACTITIONER

## 2023-03-31 NOTE — PROGRESS NOTES
DIAGNOSIS: [id number] R1 mod-poor diff p16(-) SCCA BOT    TREATMENT  Completed adjuvant chemoradiation therapy with platinum sensitization to 66 Gy ending March 9, 2023.  Treatment very well tolerated.    Contacted patient today for 3 week checkup.  Patient reports minimal residual fatigue.  He does have cough productive of clear white mucus.  Denies fever, chills, chest pain, shortness of breath or hemoptysis.  Feeds by PEG tube.  He is using antihistamines with benefit.    PE  No mucositis or thrush  Tenacious mucus  Moderate to severe fibrosis bilateral neck  Mild limitation range of motion at neck   PEG CDI  Trach CDI    A/P  1.  Excellent tolerance to treatment.  Continue with Mucinex +dextromethorphan for cough and antihistamines.  Increased caloric and fluid intake by PEG tube.  Weight stable.  Sent for physical therapy.  2.  Follow-up with Dr. James for surveillance; f/u Dr. Tamez  3.  Return to clinic 3m. PET 3m post-RT.  4.  COVID-19 precautions discussed.

## 2023-04-03 ENCOUNTER — OFFICE VISIT (OUTPATIENT)
Dept: HEMATOLOGY/ONCOLOGY | Facility: CLINIC | Age: 73
End: 2023-04-03
Payer: MEDICARE

## 2023-04-03 VITALS
TEMPERATURE: 98 F | WEIGHT: 126.81 LBS | HEART RATE: 85 BPM | BODY MASS INDEX: 20.38 KG/M2 | HEIGHT: 66 IN | SYSTOLIC BLOOD PRESSURE: 121 MMHG | DIASTOLIC BLOOD PRESSURE: 59 MMHG | RESPIRATION RATE: 16 BRPM

## 2023-04-03 DIAGNOSIS — C32.9 LARYNX CANCER: ICD-10-CM

## 2023-04-03 DIAGNOSIS — C32.9 LARYNGEAL CANCER: ICD-10-CM

## 2023-04-03 DIAGNOSIS — C01 MALIGNANT NEOPLASM OF BASE OF TONGUE: Primary | ICD-10-CM

## 2023-04-03 DIAGNOSIS — D50.0 IRON DEFICIENCY ANEMIA DUE TO CHRONIC BLOOD LOSS: ICD-10-CM

## 2023-04-03 DIAGNOSIS — E53.8 VITAMIN B12 DEFICIENCY: ICD-10-CM

## 2023-04-03 PROCEDURE — 3008F BODY MASS INDEX DOCD: CPT | Mod: CPTII,S$GLB,, | Performed by: INTERNAL MEDICINE

## 2023-04-03 PROCEDURE — 1160F RVW MEDS BY RX/DR IN RCRD: CPT | Mod: CPTII,S$GLB,, | Performed by: INTERNAL MEDICINE

## 2023-04-03 PROCEDURE — 1101F PR PT FALLS ASSESS DOC 0-1 FALLS W/OUT INJ PAST YR: ICD-10-PCS | Mod: CPTII,S$GLB,, | Performed by: INTERNAL MEDICINE

## 2023-04-03 PROCEDURE — 99214 PR OFFICE/OUTPT VISIT, EST, LEVL IV, 30-39 MIN: ICD-10-PCS | Mod: S$GLB,,, | Performed by: INTERNAL MEDICINE

## 2023-04-03 PROCEDURE — 1160F PR REVIEW ALL MEDS BY PRESCRIBER/CLIN PHARMACIST DOCUMENTED: ICD-10-PCS | Mod: CPTII,S$GLB,, | Performed by: INTERNAL MEDICINE

## 2023-04-03 PROCEDURE — 99214 OFFICE O/P EST MOD 30 MIN: CPT | Mod: S$GLB,,, | Performed by: INTERNAL MEDICINE

## 2023-04-03 PROCEDURE — 1126F AMNT PAIN NOTED NONE PRSNT: CPT | Mod: CPTII,S$GLB,, | Performed by: INTERNAL MEDICINE

## 2023-04-03 PROCEDURE — 3078F PR MOST RECENT DIASTOLIC BLOOD PRESSURE < 80 MM HG: ICD-10-PCS | Mod: CPTII,S$GLB,, | Performed by: INTERNAL MEDICINE

## 2023-04-03 PROCEDURE — 1126F PR PAIN SEVERITY QUANTIFIED, NO PAIN PRESENT: ICD-10-PCS | Mod: CPTII,S$GLB,, | Performed by: INTERNAL MEDICINE

## 2023-04-03 PROCEDURE — 3008F PR BODY MASS INDEX (BMI) DOCUMENTED: ICD-10-PCS | Mod: CPTII,S$GLB,, | Performed by: INTERNAL MEDICINE

## 2023-04-03 PROCEDURE — 3288F PR FALLS RISK ASSESSMENT DOCUMENTED: ICD-10-PCS | Mod: CPTII,S$GLB,, | Performed by: INTERNAL MEDICINE

## 2023-04-03 PROCEDURE — 3074F PR MOST RECENT SYSTOLIC BLOOD PRESSURE < 130 MM HG: ICD-10-PCS | Mod: CPTII,S$GLB,, | Performed by: INTERNAL MEDICINE

## 2023-04-03 PROCEDURE — 1159F PR MEDICATION LIST DOCUMENTED IN MEDICAL RECORD: ICD-10-PCS | Mod: CPTII,S$GLB,, | Performed by: INTERNAL MEDICINE

## 2023-04-03 PROCEDURE — 3078F DIAST BP <80 MM HG: CPT | Mod: CPTII,S$GLB,, | Performed by: INTERNAL MEDICINE

## 2023-04-03 PROCEDURE — 1101F PT FALLS ASSESS-DOCD LE1/YR: CPT | Mod: CPTII,S$GLB,, | Performed by: INTERNAL MEDICINE

## 2023-04-03 PROCEDURE — 1159F MED LIST DOCD IN RCRD: CPT | Mod: CPTII,S$GLB,, | Performed by: INTERNAL MEDICINE

## 2023-04-03 PROCEDURE — 3288F FALL RISK ASSESSMENT DOCD: CPT | Mod: CPTII,S$GLB,, | Performed by: INTERNAL MEDICINE

## 2023-04-03 PROCEDURE — 3074F SYST BP LT 130 MM HG: CPT | Mod: CPTII,S$GLB,, | Performed by: INTERNAL MEDICINE

## 2023-04-03 NOTE — PROGRESS NOTES
[facility] Hematology/Oncology  PROGRESS NOTE -  Follow-up Visit      Subjective:       Patient ID:   NAME: Cyrus Batres Jr. : 1950     72 y.o. 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  The patient is here today to go over the results of the recently ordered labs, tests and studies. He is here by himself.    He has since completed chemotherapy and  XRT.  He seems to have tolerated the regimen fairly well with no new complaints.  Some weight loss but reports he is staying hydrated. He does have some occasional GRIFFIN.     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 He had his portacath removed on  and replaced on 2023 by Dr Le    He is breathing ok. No HA's or CP; no pain at this time        Discussed covid19 and he has been vaccinated      ROS:   GEN: normal without any fever, night sweats or weight loss; doing well with tube feeds   HEENT: normal with no HA's, sore throat, stiff neck, changes in vision  CV: normal with no CP, SOB, PND, some occasional GRIFFIN  PULM: normal with no SOB, cough, hemoptysis, sputum or pleuritic pain  GI: no current N/V  ; has peg tube;    : normal with no hematuria, dysuria  BREAST: normal with no mass, discharge, pain  SKIN: normal with no rash, erythema, bruising, or swelling     Pain Scale:   0    Allergies:  Review of patient's allergies indicates:   Allergen Reactions    Lovastatin Itching    Pollen extracts     Lovastatin Rash     Not confirmed but pt skeptical       Medications:    Current Outpatient Medications:     acetaminophen (TYLENOL) 325 MG tablet, Take 325 mg by mouth every 6 (six) hours as needed for Pain., Disp: , Rfl:     calcitrioL (ROCALTROL) 1 mcg/mL solution, Take 0.25 mLs (0.25 mcg total) by Per G Tube route once daily., Disp: 15 mL, Rfl: 12    calcium carbonate 500 mg/5 mL (1,250 mg/5 mL), 10 mLs (1,000 mg total) by Per G Tube route 4 (four) times daily with meals and nightly., Disp: 473 mL, Rfl: 12    esomeprazole (NEXIUM) 40 MG capsule, 40 mg before breakfast., Disp: , Rfl:     famotidine (PEPCID) 40 mg/5 mL (8 mg/mL) suspension, Give 2.5 mLs (20 mg total) by Per G Tube route 2 (two) times daily., Disp: 50 mL, Rfl: 11    ibuprofen (ADVIL,MOTRIN) 200 MG tablet, Take 200 mg by mouth every 6 (six) hours as needed for Pain., Disp: , Rfl:     lactose-reduced food with fibr (JEVITY 1.5 TRISHA) 0.06 gram-1.5 kcal/mL Liqd, 6 cartons per day via peg.  Flush 60ml before and after each bolus, Disp: 180 each, Rfl: 11    levothyroxine (SYNTHROID) 100 MCG tablet, 1 tablet (100 mcg total) by Per G Tube route before breakfast., Disp: 30 tablet, Rfl: 11    losartan (COZAAR) 100 MG tablet, Take 0.5 tablets (50 mg total) by mouth once daily. (Patient taking differently: Take 50 mg by mouth every evening.), Disp: 45 tablet, Rfl: 3    metFORMIN (GLUCOPHAGE) 500 MG tablet, Take 1 tablet (500 mg total) by mouth 2 (two) times daily with meals., Disp: 60 tablet, Rfl: 3    promethazine (PHENERGAN) 25 MG tablet, Take 1 tablet (25 mg total) by mouth every 4 to 6 hours as needed for Nausea., Disp: 30 tablet, Rfl: 2    traZODone (DESYREL) 50 MG tablet, TAKE 1 TABLET BY MOUTH NIGHTLY AS NEEDED FOR INSOMNIA., Disp: 90 tablet, Rfl: 1    calcium carbonate (TUMS) 200 mg calcium (500 mg) chewable tablet, 2 tablets  "(1,000 mg total) by Per G Tube route 5 (five) times daily., Disp: 300 tablet, Rfl: 11    zolpidem (AMBIEN) 5 MG Tab, 1 tablet (5 mg total) by Per G Tube route nightly as needed (difficult with sleep)., Disp: 30 tablet, Rfl: 0    PMHx/PSHx Updates:  See patient's last visit with me on 3/6/2023  See H&P on 9/23/2022        Pathology:   Cancer Staging   Larynx cancer  Staging form: Larynx - Glottis, AJCC 8th Edition  - Clinical stage from 8/6/2020: Stage II (cT2, cN0, cM0) - Signed by Fariha Muñoz NP on 8/6/2020  - Pathologic stage from 1/20/2022: Stage LOU (pT4a, pN0, cM0) - Signed by Fariha Muñoz NP on 1/20/2022  Staging form: Pharynx - P16 Negative Oropharynx, AJCC 8th Edition  - Clinical: Stage II (rcT2, cN0, cM0) - Signed by Matheus Portillo Jr., MD on 5/30/2022    Malignant neoplasm of base of tongue  Staging form: Pharynx - P16 Negative Oropharynx, AJCC 8th Edition  - Clinical stage from 5/20/2022: Stage II (cT2, cN0, cM0, p16-) - Signed by Saúl Kessler MD on 7/7/2022    Dissection 11/9/2022:    Final Pathologic Diagnosis 1. "left submandibular lymph node", dissection:       - Three lymph nodes, negative for carcinoma (0/3)   2. 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       - Tumor size: 4.5 cm       - Resection margins: left superior pharyngeal margin focally involved;   all other margins are negative for carcinoma       - Left internal jugular vein: free of tumor       - One lymph node, negative for carcinoma (0/1)   Note: P16 immunostain is negative     Tumor Site       Oropharynx  involving Base of tongue       Tumor Laterality       Midline       Tumor Size       Greatest Dimension (Centimeters) 4.5 cm         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)       Histologic Grade       G2 to G3: Moderate to Poorly differentiated       Lymphovascular Invasion       Not " identified       Perineural Invasion       Not identified     MARGINS      Margins       Involved by invasive tumor     Margin(s)   Involved by Invasive Tumor:      left superior pharyngeal margin; all other   margins are free of tumor     LYMPH NODES    Regional Lymph Node Status:    :     Regional Lymph Node   Status:      All regional lymph nodes negative for tumor      pT Category:               pT3   pN Category:               pN0      Base of Tongue Biopsy  4/27/2022:  Final Pathologic Diagnosis BIOPSY OF BASE OF THE TONGUE:   THE INFILTRATING POORLY DIFFERENTIATED SQUAMOUS CELL CARCINOMA   THE TUMOR IS P16 NEGATIVE.  THE POSITIVE AND NEGATIVE CONTROLS STAINED   APPROPRIATELY      Larynx resection/thyroidectomy 1/6/2022:     Final Pathologic Diagnosis 1. Lymph nodes, left neck levels 2,  3 and 4, dissection:   - Nineteen lymph nodes, all  negative  for metastatic carcinoma (0/19)   2. Lymph nodes, right neck levels 2,  3 and 4, dissection:   - Twenty-eight lymph nodes, all  negative  for metastatic carcinoma (0/28)   3. Possible parathyroid gland, excision:   - Benign parathyroid gland tissue (0.003 g)   4. 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 Soft tissue, posterior tracheal margin, excision:   - Benign, focally reactive respiratory mucosa with submucosal acute   inflammation,  negative  for dysplasia or malignancy   6. Soft tissue, anterior tracheal margin, excision:   - Benign respiratory mucosa with subepithelial cartilage,  negative   "for   dysplasia or malignancy   7. Soft tissue, posterior tracheal margin #2, excision:   - Benign, focally reactive respiratory mucosa with submucosal acute   inflammation,  negative  for dysplasia or malignancy   8. Soft tissue, anterior tracheal margin #2, excision:   - Benign, reactive focally ulcerated respiratory mucosa with submucosal acute   inflammation,  negative  for dysplasia or malignancy             Laryngoscope 8/4/2020: (with Dr Noel):  Final Pathologic Diagnosis 1.  Left true vocal fold, biopsy:       -  Invasive moderately differentiated squamous cell carcinoma,   keratinizing type   2.  Left true vocal fold, biopsy:       -  Invasive moderately differentiated squamous cell carcinoma,   keratinizing type             Laryngoscope 3/17/2020 (with Dr Xiao):  Final Pathologic Diagnosis 1.  BIOPSY OF LEFT TRUE VOCAL CORD:   SEVERELY DYSPLASTIC APPEARING SQUAMOUS MUCOSA   INVASIVE CARCINOMA IS NOT DOCUMENTED   ON THE OTHER HAND, THESE FRAGMENTS ARE NODUL AND WITHOUT SUBMUCOSA FOR   EVALUATION; IT IS POSSIBLE THAT THEY REFLECT INVASIVE SQUAMOUS CARCINOMA   2.  BIOPSY OF LEFT ANTERIOR COMMISSURE:   MODERATE DYSPLASIA   NO INVASIVE CARCINOMA IDENTIFIED             Objective:     Vitals:  Blood pressure (!) 121/59, pulse 85, temperature 98.3 °F (36.8 °C), resp. rate 16, height 5' 6" (1.676 m), weight 57.5 kg (126 lb 12.8 oz).    Physical Examination:   GEN: no apparent distress, comfortable; AAOx3  HEAD: atraumatic and normocephalic  EYES: no pallor, no icterus, PERRLA  ENT: OMM, no pharyngeal erythema, external ears WNL; no nasal discharge; no thrush; s/p laryngectomy with flap since healed well; trach   NECK: no masses, thyroid normal, trachea midline, no LAD/LN's, supple; post-op dissection healed well;    CV: RRR with no murmur; normal pulse; normal S1 and S2; no pedal edema; portacath   CHEST: Normal respiratory effort; CTAB; normal breath sounds; no wheeze or crackles  ABDOM: nontender and " nondistended; soft; normal bowel sounds; no rebound/guarding; peg tube  MUSC/Skeletal: ROM normal; no crepitus; joints normal; no deformities or arthropathy  EXTREM: no clubbing, cyanosis, inflammation or swelling  SKIN: no rashes, lesions, ulcers, petechiae or subcutaneous nodules  : no mahan  NEURO: grossly intact; motor/sensory WNL; AAOx3; no tremors  PSYCH: normal mood, affect and behavior  LYMPH: normal cervical, supraclavicular, axillary and groin LN's          Labs:     Lab Results   Component Value Date    WBC 4.40 03/31/2023    HGB 10.2 (L) 03/31/2023    HCT 30.5 (L) 03/31/2023     (H) 03/31/2023     03/31/2023     CMP  Sodium   Date Value Ref Range Status   03/31/2023 136 136 - 145 mmol/L Final     Potassium   Date Value Ref Range Status   03/31/2023 4.2 3.5 - 5.1 mmol/L Final     Chloride   Date Value Ref Range Status   03/31/2023 98 95 - 110 mmol/L Final     CO2   Date Value Ref Range Status   03/31/2023 26 23 - 29 mmol/L Final     Glucose   Date Value Ref Range Status   03/31/2023 117 (H) 70 - 110 mg/dL Final     BUN   Date Value Ref Range Status   03/31/2023 24 (H) 8 - 23 mg/dL Final     Creatinine   Date Value Ref Range Status   03/31/2023 1.1 0.5 - 1.4 mg/dL Final     Calcium   Date Value Ref Range Status   03/31/2023 9.2 8.7 - 10.5 mg/dL Final     Total Protein   Date Value Ref Range Status   03/31/2023 7.8 6.0 - 8.4 g/dL Final     Albumin   Date Value Ref Range Status   03/31/2023 4.5 3.5 - 5.2 g/dL Final   11/18/2020 3.7 3.6 - 5.1 g/dL Final     Comment:     For additional information, please refer to   http://education.[employer].CritiTech/faq/QHR630 (This link is   being provided for informational/ educational purposes only.)  This test was developed and its analytical performance   characteristics have been determined by [facility]. It has not been cleared or approved by the   [place] Food and Drug Administration. This assay has been validated    pursuant to the CLIA regulations and is used for clinical   purposes.  @ Test Performed By:  [facility]  Yo Cortes M.D.,   [address] [place] [address]  [place]  [id number]       Total Bilirubin   Date Value Ref Range Status   03/31/2023 0.8 0.1 - 1.0 mg/dL Final     Comment:     For infants and newborns, interpretation of results should be based  on gestational age, weight and in agreement with clinical  observations.    Premature Infant recommended reference ranges:  Up to 24 hours.............<8.0 mg/dL  Up to 48 hours............<12.0 mg/dL  3-5 days..................<15.0 mg/dL  6-29 days.................<15.0 mg/dL       Alkaline Phosphatase   Date Value Ref Range Status   03/31/2023 447 (H) 55 - 135 U/L Final     AST   Date Value Ref Range Status   03/31/2023 37 10 - 40 U/L Final     ALT   Date Value Ref Range Status   03/31/2023 39 10 - 44 U/L Final     Anion Gap   Date Value Ref Range Status   03/31/2023 12 8 - 16 mmol/L Final     eGFR if    Date Value Ref Range Status   07/29/2022 >60.0 >60 mL/min/1.73 m^2 Final     eGFR if non    Date Value Ref Range Status   07/29/2022 >60.0 >60 mL/min/1.73 m^2 Final     Comment:     Calculation used to obtain the estimated glomerular filtration  rate (eGFR) is the CKD-EPI equation.          Lab Results   Component Value Date    IRON 30 (L) 11/25/2022    TRANSFERRIN 114 (L) 11/25/2022    TIBC 169 (L) 11/25/2022    FESATURATED 18 (L) 11/25/2022            Radiology/Diagnostic Studies:      CTA Neck    Result Date: 9/20/2022  EXAMINATION: CTA NECK CLINICAL HISTORY: Head/neck cancer, monitor;Malignant neoplasm of base of tongue TECHNIQUE: CT angiogram was performed from the level of the scott to the EAC following the IV administration of 75mL of Omnipaque 350.   Sagittal and coronal reconstructions and maximum intensity projection reconstructions were performed. Arterial  stenosis percentages are based on NASCET measurement criteria. COMPARISON: CTA neck dated 05/20/2022 FINDINGS: Aortic arch and great vessels: There is a conventional left-sided 3 vessel arch.  The aortic arch is widely patent.  There is stable soft and calcified plaque in the proximal left subclavian artery with a similar appearance the prior study and possibly within radiation field but without critical stenosis or occlusion.  The right subclavian artery is patent.  The brachiocephalic trunk and origin of the left common carotid artery are patent. Carotid arteries Right carotid artery: There is calcified plaque scattered in the right common carotid artery without critical stenosis, occlusion or change.  There is no measurable stenosis of the internal carotid artery which is patent to the skull base. Left carotid artery: There is mild plaque scattered in the left common carotid artery without change and without critical stenosis or occlusion.  There is no measurable stenosis of the internal carotid artery. Vertebral arteries: The left vertebral artery is dominant and patent throughout its course in the neck.  The right vertebral artery is hypoplastic but patent.  There is no critical stenosis, occlusion, thrombus or dissection.  There is no change. The study extends intracranially.  There is calcified plaque in the V4 segment of the left vertebral artery resulting in a moderate to marked short segment nonocclusive stenosis.  The right vertebral artery partially terminates in a posteroinferior cerebellar artery with a short segment moderate to marked stenosis of the very distal right vertebral artery.  Some flow within the distal right vertebral artery may represent retrograde flow from the dominant left vertebral artery.  The basilar artery is patent.  The origins of the superior cerebellar and posterior cerebral artery on the right are patent.  There is fetal origin of the left posterior cerebral artery which is  patent. There is plaque in the cavernous segments of both internal carotid arteries but there is no critical stenosis or large vessel occlusion of the anterior circulation vessels.  There is no large aneurysm. Other: There is a similar appearance of the included upper lungs with pleural and parenchymal changes anteriorly in the upper lobes bilaterally.  As previously discussed, timing of the contrast bolus is performed during the arterial phase for CTA neck.  Therefore, enhancement of the soft tissues is somewhat suboptimal due to this technique. There has been a large region of soft tissue resection in the anterior mid and lower neck soft tissues with continued open defect in the upper chest and lower neck above the manubrium.  Soft tissue seen along the left posterolateral border of the trachea could represent secretions or mucus.  This was present previously. Redemonstrated is a large heterogeneously enhancing lesion centered at the level of the tongue base and floor of the mouth centrally into the left.  There is scattered calcifications.  The prior CTA demonstrated regions of central necrosis which are no longer definitely present but diffusely heterogeneous density and enhancement likely represents regions of necrosis.  This large heterogeneously enhancing soft tissue mass extends more anteriorly in the floor of the mouth on the left and measures at least 5.6 cm in greatest anterior to posterior dimension with 3.6 cm transverse dimension.  This does extend anteriorly to the medial margin of the body of the mandible on the left. There are post treatment changes with stranding in the neck fat.     1. Similar appearance of the neck vessels when compared to the prior CTA neck with mild narrowing at the origin of the left subclavian artery.  There is no critical stenosis, occlusion, thrombosis or dissection involving the cervical vertebral or carotid arteries. 2. Larger mass within the hypopharynx and tongue base  extending anteriorly to the floor of the mouth on the left to the medial margin of the body of the mandible without obvious bony destruction. 3. There is nonocclusive stenosis of the distal V4 segment of the left vertebral artery without change. 4. There is no large vessel occlusion or critical stenosis of the anterior circulation. 5. There is developmental variation with fetal origin of the left posterior cerebral artery. Electronically signed by: Rex Joyner MD Date:    09/20/2022 Time:    11:31    CT Abdomen Pelvis With Contrast    Result Date: 9/1/2022  CMS MANDATED QUALITY DATA - CT RADIATION - 436 All CT scans at this facility utilize dose modulation, iterative reconstruction, and/or weight based dosing when appropriate to reduce radiation dose to as low as reasonably achievable. Reason: abdominal pain partial history of laryngeal carcinoma TECHNIQUE: CT abdomen and pelvis with 100 mL Omnipaque 350. COMPARISON: PET/CT 5/13/2022 CT ABDOMEN: Coronary artery calcification and extremely tiny hiatal hernia incidentally noted. Visualized lung bases are clear. Liver, gallbladder, pancreas, spleen, adrenals, and kidneys are normal. Mild aortoiliac calcifications present. Gastrostomy tube tip lies in distal gastric body. Small intestines are unremarkable. Mild wall thickening affects the ascending colon with pneumatosis intestinalis diffusely affecting the ascending colon. No other free intraperitoneal gas or, and remainder of colon is normal. A normal appendix is present. The major mesenteric vascular structures are patent. No acute osseous abnormality. CT PELVIS: Prostate is slightly enlarged. Bladder is normal. No free pelvic fluid. Tiny right and small to moderate left fat-containing inguinal hernias are present. No acute osseous abnormality. IMPRESSION: 1. Pneumatosis intestinalis affecting the ascending colon with associated mild wall thickening. Etiology of this is undetermined. Potential considerations  include intestinal ischemia or pneumatosis related to chemotherapy. Clinical and laboratory correlation is needed to assess significance. No portal venous gas or free intraperitoneal air however. 2. Coronary artery calcifications Electronically signed by:  Nael Hui MD  9/1/2022 6:20 PM CDT Workstation: [id number]    NM PET CT Routine Skull to Mid Thigh    Result Date: 9/27/2022  PET CT WITH IMAGE FUSION HISTORY:  restage tongue cancer RESTAGING...  HX: TONGUE CA.  DX: April 2022.  LAST CHEMO TREATMENT WAS 3WKS AGO.  EVALUATE TX RESPONSE.   ALSO HX OF LARYNGEAL CA IN AUGUST 2020.  MULTIPLE SURGERIES: LARYNGECTOMY, THYROIDECTOMY & TRACHEOSTOMY.  RAD TX WAS COMPLETED IN NOV 2020. TECHNIQUE: Following IV administration of 11.2 mCi of F-18 labeled FDG into right antecubital fossa and a 60 minute delay, PET CT was performed from the vertex of the skull through the proximal thighs with an integrated PET CT scanner with image fusion. CT images were obtained to aid in attenuation correction and PET localization. The patient's serum glucose at the time of the exam was 136 mg/dL. COMPARISON: PET/CT 5/13/2022 FINDINGS: Poorly characterized soft tissue mass involving base of tongue approximately measures 4.3 x 2.8 cm (series 3 image 65) appearing similar to the prior exam. This reaches current max SUV of 11.8, not significantly changed from prior of 11.4. No other abnormal FDG activity. Intracranial compartment is unremarkable. Trace bilateral maxillary sinus mucosal thickening is present. Postoperative changes of laryngectomy and tracheostomy are present. Surgical clips occur throughout the neck. No definite enlarged cervical or supraclavicular lymph node, with evaluation limited by lack of IV contrast. Left subclavian port catheter tip terminates in SVC. Diffuse coronary artery calcifications are present. No enlarged mediastinal or axillary lymph nodes. No pulmonary nodule or mass. Gastrostomy tube tip lies in gastric  body. Moderate aortoiliac calcifications are present. Small fat-containing left inguinal hernia incidentally noted. Mild degenerative changes affect the spine. No suspicious osseous abnormality. IMPRESSION: 1. No significant change in the FDG avid mass involving the tongue base, remaining characteristic of malignancy. 2. No new FDG avid malignancy or metastatic disease. Electronically signed by:  Nael Hui MD  9/27/2022 2:38 PM CDT Workstation: [id number]    CT Abdomen Pelvis  Without Contrast    Result Date: 9/4/2022  CMS MANDATED QUALITY DATA - CT RADIATION  436 All CT scans at this facility utilize dose modulation, iterative reconstruction, and/or weight based dosing when appropriate to reduce radiation dose to as low as reasonably achievable. CT ABDOMEN PELVIS WITHOUT IV CONTRAST CLINICAL HISTORY: 72 years Male Follow-up pneumatosis COMPARISON: CT abdomen and pelvis September 1, 2022 FINDINGS: Imaging through the lower thorax demonstrates subsegmental atelectasis of the dependent lower lobes. Coronary artery calcification. Bone window images show no acute or aggressive osseous abnormality. Transitional lumbosacral vertebral body. On this unenhanced exam, no focal hepatic lesion. Gallbladder and biliary tree are unremarkable. Spleen appears normal. Pancreas is unremarkable. No adrenal lesion. No renal calculi or hydronephrosis. Ureters are normal in caliber. Urinary bladder is within normal limits. Gastrostomy tube is in place within the stomach. Stomach is largely collapsed. No evidence of small bowel obstruction. Pneumatosis and pericolonic abscess involving the ascending colon is redemonstrated, slightly diminished compared to prior. Mild wall thickening throughout the colon. No free fluid or free air within the abdomen or pelvis. Aortoiliac atherosclerotic calcification. No pathologically enlarged lymph nodes within the abdomen or pelvis. Bilateral fat-containing inguinal hernias, larger on the left.  IMPRESSION: Pneumatosis and pericolonic gas about the ascending colon has decreased in volume compared to prior. Persistent colonic wall thickening which could indicate colitis. Coronary and aortic atherosclerosis. Gastrostomy tube is within the stomach. Bilateral inguinal hernias. Electronically signed by:  Jose De Jesus Oleary MD  9/4/2022 4:06 PM CDT Workstation: [id number]    CTA neck  5/20/2022:     IMPRESSION:  Persistent mass within the hypopharynx consistent with malignancy.  By my measurements it is larger than on April 24, 2022 with central necrosis.  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 This is similar to the previous April 2022 study.  No evidence of arterial compromise related to malignancy.     PET 5/13/2022:     IMPRESSION:  1. Postoperative changes of prior laryngectomy and lymph node resection/radiation.  2. Masslike FDG avid lesion to the left of midline at the tongue base concerning for residual/recurrent disease.  3. Adjacent confluent nodular extension along the inferior left side of the mass.  4. No FDG avid lymphadenopathy or convincing additional sites of disease in the chest, abdomen or pelvis.     CT head 5/10/2022:  FINDINGS: Comparison to multiple prior exams. There is no acute intracranial hemorrhage, with no mass effect or abnormal extra-axial fluid. Mild scattered areas of nonspecific hypoattenuation involve the deep periventricular white matter, with gray-white differentiation maintained.     There is mild generalized prominence of the cortical sulci and ventricles. The cerebellum and brainstem are unremarkable. There are carotid siphon vascular calcifications. The visualized paranasal sinuses and mastoid air cells are clear. There is no acute calvarial fracture or scalp hematoma.     IMPRESSION: No acute intracranial hemorrhage or acute calvarial fracture     CT soft neck 4/24/2022;     Oral tongue/tongue base:  At the  base of the tongue on the left there are findings of a heterogeneously enhancing mass measuring 2.1 cm highly concerning for a neoplastic process.     True and false cords:  Patient status post laryngectomy which has been performed in the interim. Soft tissue stranding in the lower neck likely related to a previous flat reconstruction. No enhancement or lymphadenopathy within the lower neck.     Lymph node assessment:Negative     Surrounding soft tissues:  Negative     Vasculature:  Negative     Osseous structures:  Negative     Lung apices:  Scarring involving the lung apices bilaterally likely related to previous radiation.     IMPRESSION:  1. Postoperative and radiation changes involving the lower neck.  2. Findings highly concerning for a developing mass at the left base of the tongue enhancing 2.1 cm region. Direct visualization in this region would be of benefit.        CT soft neck  12/24/2021  IMPRESSION:  1.  Laryngeal mass as on prior exam. Laryngeal airway narrowing has not changed.  2.  No evidence for active hemorrhage.  3.  Partially visualized patchy groundglass infiltrates in both upper lobes. Also see CT chest report     CTA Chest 12/24/2021:  IMPRESSION:     1.  No pulmonary embolism.     2.  Soft tissue stranding in the region of the strap musculature with ill-defined hypodensity measuring 2.8 x 1.4 cm in the cervical midline.  Findings concerning for infectious process or abscess. Neoplastic process could have similar imaging characteristics.     3.  Suggested erosion of the proximal right parasymphyseal aspect of the thyroid shield.  Correlated with CT soft tissue neck findings. Findings could represent osteomyelitis. Neoplastic process cannot be excluded.     4.  Hepatic steatosis.  5.  Thickening of the gastric wall. Prominence of perigastric vasculature. Small hiatal hernia.     6.  Moderate stool burden.  Correlate for constipation.        PET 12/15/2021:  IMPRESSION:     Substantial  qualitative and quantitative increase of hypermetabolic FDG activity associated with the larynx in this patient with known supraglottic neoplasm.     No evidence of FDG avid metastatic disease involving neck, chest, abdomen or pelvis.     PET 8/21/2020:     Impression:     1. Intensely hypermetabolic plaque-like mass along the left true vocal cord, compatible with known laryngeal carcinoma.  2. No findings of regional metastatic disease in the neck, or distant metastatic disease.        CT Chest 8/10/2020:     IMPRESSION:     No metastatic disease within the chest.  Three-vessel coronary artery calcification. Nondilated cardiac  chambers     CT Soft neck 7/22/2020:  IMPRESSION:  1. Slight interval increase in size of the previously described  enhancing nodule along the anterior left vocal cord.  2. No pathologic lymphadenopathy.  3. Additional and incidental findings as noted above.     CT Soft neck  3/16/2020:     Impression:     6 mm enhancing focus involving the superior aspect of the left focal cord near the anterior commisure.  Recommend direct visualization for further evaluation of laryngeal polyp     Question of 2 mm submucosal lipoma involving the midportion of the superior aspect of the left vocal cord.     Mild arteriosclerosis involving the brachiocephalic arteries and carotid arteries     Mild degenerative change of the cervical spine        I have reviewed all available lab results and radiology reports.    Assessment/Plan:   (1) 72 y.o. 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     - Patient has been under the care of Dr Khoobehi with [facility] and Dr Portillo with rad/onc.   - He was recently found to have a new primary cancer involving the base of his tongue in April 2022. He was deemed not to be a candidate for any further radiation and did not want to undergo any  further surgery as this would necessitate a glossectomy procedure.   - He has been on adjuvant chemotherapy per direction of Dr Khoobehi and has had 3 cycles. His therapy course has been complicated with persistent diarrhea and N/V.      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 He saw Dr Lucero with Rad/onc this am. They are going [place] [facility] [facility] [place] James about surgical options. If he is not a surgical candidate then will proceed with cisplatin and XRT combine therapy.     10/6/2022:  - He saw Dr Lucero with Rad/onc, and Dr James and his case was presented to [facility] [place] [facility] and  he general consensus was to proceed to surgery.  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   - He had his portacath removed on 11/28.   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, keratinizing type, moderate to poorly differentiated tumor  - Four LN's were all negative  - he did have a positive left superior pharyngeal margin  - pT3 pN0  - reviewed the latest NCCN guidelines again from Version 1.2023; he may  need some further systemic therapy due to the margin  - check on Rad/onc follow-up     1/23/2023:  - s/p new portacath placed on 1/12/2023 with Dr Le  - starting combined chemotherapy and XRT - cisplatin  - s/p chemotherapy school with Whit    2/6/2023:  - he seems to be tolerating the chemotherapy well at this time  - continued with concomitant therapy with XRT    2/22/2023:  - he seems to be tolerating the chemotherapy well at this time  - continued with concomitant therapy with XRT  - labs relatively adequate  - will check on his refills    3/6/2023:  - finishing up XRT this comking Thursday  - last chemotherapy today    4/3/2023:  - He has since completed chemotherapy and  XRT.  He seems to have tolerated the regimen fairly well with no new complaints.  Some weight loss but reports he is staying hydrated. He does have some occasional GRIFFIN.   - He is seeing ENT tomorrow with Dr James       (2) Hx/of Laryngeal cancer in Aug 2020 stage II (cT2 N0)  - s/p radiation followed by bilateral neck dissection and total thyroidectomy     Brief Oncology Summary for his laryngeal CA hx:     Patient was noted to initially have a suspicious lesion on the left true vocal cord by laryngoscopy with Dr Xiao in March 2020 with biopsy showing severe dysplastic changes without definitive invasive process. He had a CT scan on 3/16/2020 which showed a 6mm nodule on the left vocal cord. A repeat Ct scan four months later on 7/22/2020 showed the nodule enlarged to 1.4 x 1.1 cm in size. Patient was then seen by Dr Noel and underwent repeat scope in Aug 2020 who found a bulky deeply invading tumor which was debulked and the pathology coming back moderately differentiated SCCA without lymphovascular or perineural invasion. Hs case was subsequently presented to the tumor board and the consensus was to proceed with radiation. He completed XRT on 10/30/2020 and proceeded with regular laryngoscopy surveillance. He eventually required  salvage laryngectomy and neck dissection with flap with Dr James on Jan 6th 2022. 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 Fifty lymph nodes were removed which were all negative. Pathology TNM was pT4a, pN0. Patient did not require any adjuvant therapy afterwards.      (2) HTN and hypercholesterolemia     (3) Paroxysmal atrial fibrillation, V tach     (4) DM II     (5) B12 deficiency      (6) Fatty liver disease, GERD           VISIT DIAGNOSES:      Malignant neoplasm of base of tongue    Larynx cancer    Laryngeal cancer    Iron deficiency anemia due to chronic blood loss    Vitamin B12 deficiency          PLAN:  Proceed with ENT evaluation tomorrow; expect he will need repeat scans in about 10-12 weeks post-XRT   s/p chemotherapy school with Briana - discussed the side-effect profile, provided literature and obtained consents  F/u Rad/onc follow-up as directed  F/u with Dr James with ENT and Dr Lucero with rad/onc  Check labs monthly for now  F/u with PCP, ENT, etc  Dietician f/u on the tube feeds     RTC in 4 week with Whit and 8 weeks with myself  Fax note to  Bandar/Dameon Lucero Hasney, Yesenia Gomez       Discussion:     COVID-19 Discussion:     I had long discussion with patient and any applicable family about the COVID-19 coronavirus epidemic and the recommended precautions with regard to cancer and/or hematology patients. I have re-iterated the CDC recommendations for adequate hand washing, use of hand -like products, and coughing into elbow, etc. In addition, especially for our patients who are on chemotherapy and/or our otherwise immunocompromised patients, I have recommended avoidance of crowds, including movie theaters, restaurants, churches, etc. I have recommended avoidance of any sick or symptomatic family members and/or friends. I have also  "recommended avoidance of any raw and unwashed food products, and general avoidance of food items that have not been prepared by themselves. The patient has been asked to call us immediately with any symptom developments, issues, questions or other general concerns.         Pathology Discussion:     I reviewed and discussed the pathology report(s) and radiograph reports (if available) in as simple to understand and/or laymen's terms to the best of my ability. I had an indepth conversation with the patient and went over the patient's individual diagnosis based on the information that was currently available. I discussed the TNM staging process with regard to the patient's particular cancer type, and the calculated stage based on the currently available TNM data and literature. I discussed the available prognostic data with regard to the current staging information and how it relates to the prognosis of their particular neoplastic process.          NCCN Guidelines:     I discussed the available treatment option(s) in accordance with the latest literature from the NCCN Clinical Practice Guidelines for the patient's particular type of cancer disorder. The NCCN Guidelines provide a "document evidence-based (and) consensus-driven management" of the care of oncology patients. The treatment recommendations were made not only in accordance to the NCCN guidelines, but also factored in to account the patient's overall age, condition, performance status and their medical co-morbidities. I went over the risks and benefits of the the treatment options (if any could be made) with regard to their particular cancer type, their cancer stage, their age, and their co-morbidities.         Chemotherapy Discussion:      I discussed the available treatment option(s) in accordance with the latest/current national evidence-based guidelines (NCCN, UpToDate, NCI, ASCO, etc where applicable), their overall age/condition and their co-morbidities. " I also went over the risks and benefits of the chemotherapy with regard to their particular cancer type, their cancer stage, their age/condition, and their co-morbidities. I provided literature on the chemotherapy regimen and discussed the chemotherapy side-effect profiles of the drug(s). I discussed the importance of compliance with obtaining and monitoring weekly lab work, and went over the potential hematopathology issues and risks with anemia, leucopenia and thrombocytopenia that can occur with chemotherapy. I discussed the potential risks of liver and kidney damage, which could be permanent and could necessitate dialysis long-term if kidney failure developed. I discussed the emetic and/or diarrheal potential of the regimen and the potential need for use of antiemetic and anti-diarrheal medications. I discussed the risk for development of anaphylactic shock, bronchospasm, dysrhythmia, and respiratory/cardiovascular arrest and/or failure. I discussed the potential risks for development of alopecia, cold sensory issues, ringing in ears, vertigo, cataracts, glaucoma, and neuropathy, all of which could end up being chronic and life-long. Some chemotherpyI discussed the risks of hand-foot syndrome and rashes, and development of other autoimmune mediated processes such as pneumonitis, hepatitis, and colitis which could be life threatening. I discussed the risks of the potential development of a rare but fatal viral mediated disease known as PML (Progressive Multifocal Leukoencephalopathy), and risk of future development of leukemia and/or lymphoma from use of certain chemotherapy agents. I discussed the need for neutropenic precautions, basic hygiene/sanitation behaviors and dietary restrictions.    The patient's consent has been obtained to proceed with the chemotherapy.The patient will be referred to Chemotherapy School /[facility] for training and education on chemotherapy, use of antiemetics and/or  anti-diarrheals, use of NSAID's, potential chemotherapy side-effects, and any specific recommendations and precautions with the particular chemotherapy agents.      I answered all of the patient's (and family's, if applicable) questions to the best of my ability and to their complete satisfaction. The patient acknowledged full understanding of the risks, recommendations and plan(s).     I have explained and the patient understands all of  the current recommendation(s). I have answered all of their questions to the best of my ability and to their complete satisfaction.         I spent over 25 mins of time with the patient. Reviewed results of the recently ordered labs, tests and studies; made directives with regards to the results. Over half of this time was spent couseling and coordinating care.    I have explained all of the above in detail and the patient understands all of the current recommendation(s). I have answered all of their questions to the best of my ability and to their complete satisfaction.   The patient is to continue with the current management plan.            Electronically signed by Venu Tamez MD                       Answers submitted by the patient for this visit:  Review of Systems Questionnaire (Submitted on 3/31/2023)  appetite change : No  unexpected weight change: No  mouth sores: No  visual disturbance: No  cough: Yes  shortness of breath: Yes  chest pain: No  abdominal pain: No  diarrhea: No  frequency: No  back pain: No  rash: No  headaches: No  adenopathy: No  nervous/ anxious: No

## 2023-04-04 ENCOUNTER — OFFICE VISIT (OUTPATIENT)
Dept: SURGICAL ONCOLOGY | Facility: CLINIC | Age: 73
End: 2023-04-04
Payer: MEDICARE

## 2023-04-04 ENCOUNTER — HOSPITAL ENCOUNTER (OUTPATIENT)
Dept: RADIOLOGY | Facility: HOSPITAL | Age: 73
Discharge: HOME | End: 2023-04-04
Attending: NURSE PRACTITIONER
Payer: MEDICARE

## 2023-04-04 ENCOUNTER — PATIENT MESSAGE (OUTPATIENT)
Dept: HEMATOLOGY/ONCOLOGY | Facility: CLINIC | Age: 73
End: 2023-04-04

## 2023-04-04 VITALS — WEIGHT: 127.19 LBS | BODY MASS INDEX: 20.44 KG/M2 | HEIGHT: 66 IN | RESPIRATION RATE: 16 BRPM

## 2023-04-04 DIAGNOSIS — C32.9 LARYNX CANCER: Primary | ICD-10-CM

## 2023-04-04 DIAGNOSIS — R74.8 ELEVATED ALKALINE PHOSPHATASE LEVEL: ICD-10-CM

## 2023-04-04 DIAGNOSIS — C01 MALIGNANT NEOPLASM OF BASE OF TONGUE: ICD-10-CM

## 2023-04-04 PROCEDURE — 31575 DIAGNOSTIC LARYNGOSCOPY: CPT | Mod: S$GLB,,, | Performed by: OTOLARYNGOLOGY

## 2023-04-04 PROCEDURE — 1126F AMNT PAIN NOTED NONE PRSNT: CPT | Mod: CPTII,S$GLB,, | Performed by: OTOLARYNGOLOGY

## 2023-04-04 PROCEDURE — 1101F PR PT FALLS ASSESS DOC 0-1 FALLS W/OUT INJ PAST YR: ICD-10-PCS | Mod: CPTII,S$GLB,, | Performed by: OTOLARYNGOLOGY

## 2023-04-04 PROCEDURE — 31575 PR LARYNGOSCOPY, FLEXIBLE; DIAGNOSTIC: ICD-10-PCS | Mod: S$GLB,,, | Performed by: OTOLARYNGOLOGY

## 2023-04-04 PROCEDURE — 76700 US EXAM ABDOM COMPLETE: CPT | Mod: TC,PO

## 2023-04-04 PROCEDURE — 3288F PR FALLS RISK ASSESSMENT DOCUMENTED: ICD-10-PCS | Mod: CPTII,S$GLB,, | Performed by: OTOLARYNGOLOGY

## 2023-04-04 PROCEDURE — 1160F RVW MEDS BY RX/DR IN RCRD: CPT | Mod: CPTII,S$GLB,, | Performed by: OTOLARYNGOLOGY

## 2023-04-04 PROCEDURE — 3008F PR BODY MASS INDEX (BMI) DOCUMENTED: ICD-10-PCS | Mod: CPTII,S$GLB,, | Performed by: OTOLARYNGOLOGY

## 2023-04-04 PROCEDURE — 1159F MED LIST DOCD IN RCRD: CPT | Mod: CPTII,S$GLB,, | Performed by: OTOLARYNGOLOGY

## 2023-04-04 PROCEDURE — 99999 PR PBB SHADOW E&M-EST. PATIENT-LVL III: CPT | Mod: PBBFAC,,, | Performed by: OTOLARYNGOLOGY

## 2023-04-04 PROCEDURE — 1160F PR REVIEW ALL MEDS BY PRESCRIBER/CLIN PHARMACIST DOCUMENTED: ICD-10-PCS | Mod: CPTII,S$GLB,, | Performed by: OTOLARYNGOLOGY

## 2023-04-04 PROCEDURE — 99999 PR PBB SHADOW E&M-EST. PATIENT-LVL III: ICD-10-PCS | Mod: PBBFAC,,, | Performed by: OTOLARYNGOLOGY

## 2023-04-04 PROCEDURE — 1101F PT FALLS ASSESS-DOCD LE1/YR: CPT | Mod: CPTII,S$GLB,, | Performed by: OTOLARYNGOLOGY

## 2023-04-04 PROCEDURE — 99213 PR OFFICE/OUTPT VISIT, EST, LEVL III, 20-29 MIN: ICD-10-PCS | Mod: 25,S$GLB,, | Performed by: OTOLARYNGOLOGY

## 2023-04-04 PROCEDURE — 3288F FALL RISK ASSESSMENT DOCD: CPT | Mod: CPTII,S$GLB,, | Performed by: OTOLARYNGOLOGY

## 2023-04-04 PROCEDURE — 1126F PR PAIN SEVERITY QUANTIFIED, NO PAIN PRESENT: ICD-10-PCS | Mod: CPTII,S$GLB,, | Performed by: OTOLARYNGOLOGY

## 2023-04-04 PROCEDURE — 3008F BODY MASS INDEX DOCD: CPT | Mod: CPTII,S$GLB,, | Performed by: OTOLARYNGOLOGY

## 2023-04-04 PROCEDURE — 99213 OFFICE O/P EST LOW 20 MIN: CPT | Mod: 25,S$GLB,, | Performed by: OTOLARYNGOLOGY

## 2023-04-04 PROCEDURE — 1159F PR MEDICATION LIST DOCUMENTED IN MEDICAL RECORD: ICD-10-PCS | Mod: CPTII,S$GLB,, | Performed by: OTOLARYNGOLOGY

## 2023-04-04 NOTE — PROGRESS NOTES
No chief complaint on file.    Oncology History   Larynx cancer   8/4/2020 Initial Diagnosis    Larynx neoplasm malignant     8/6/2020 Tumor Conference    His case was discussed at the Multidisciplinary Head and Neck Team Planning Meeting.    Representatives from Medical Oncology, Radiation Oncology, Head and Neck Surgical Oncology, Psychosocial Oncology, and Speech and Language Pathology discussed the case with the following recommendations:    1) definitive radiation              8/6/2020 Cancer Staged    Staging form: Larynx - Glottis, AJCC 8th Edition  - Clinical stage from 8/6/2020: Stage II (cT2, cN0, cM0)       8/6/2020 Cancer Staged    Cancer Staging  Larynx neoplasm malignant  Staging form: Larynx - Glottis, AJCC 8th Edition  - Clinical stage from 8/6/2020: Stage II (cT2, cN0, cM0) - Signed by Fariha Muñoz NP on 8/6/2020 12/16/2021 Tumor Conference    His case was discussed at the Multidisciplinary Head and Neck Team Planning Meeting.    Representatives from Medical Oncology, Radiation Oncology, Head and Neck Surgical Oncology, Psychosocial Oncology, and Speech and Language Pathology discussed the case with the following recommendations:    1) TL  2) oncology referral  3) psychology referral            12/27/2021 Surgery    1. DL And tracheostomy  2. PEG     1/6/2022 Surgery    1. Diagnostic direct laryngoscopy  2. Bilateral modified neck dissection of levels 2 through 4  3. Total laryngectomy  4. Total thyroidectomy with bilateral paratracheal/upper mediastinal neck dissection  5. Autotransplantation of left inferior parathyroid into left sternocleidomastoid muscle   6. Left anterolateral thigh free flap for closure of total laryngectomy neck skin defect  7. Advancement flap for closure of left thigh donor site advanced area was 25 x 10 cm     1/20/2022 Cancer Staged    Staging form: Larynx - Glottis, AJCC 8th Edition  - Pathologic stage from 1/20/2022: Stage LOU (pT4a, pN0, cM0)        5/30/2022 Cancer Staged    Staging form: Pharynx - P16 Negative Oropharynx, AJCC 8th Edition  - Clinical: Stage II (rcT2, cN0, cM0)       1/23/2023 -  Chemotherapy    Treatment Summary   Plan Name: OP HEAD NECK CISPLATIN WEEKLY + RADIOTHERAPY  Treatment Goal: Curative  Status: Active  Start Date: 1/23/2023  End Date: 3/6/2023  Provider: Venu Tamez MD  Chemotherapy: CISplatin (Platinol) 40 mg/m2 = 66 mg in sodium chloride 0.9% 631 mL chemo infusion, 40 mg/m2 = 66 mg, Intravenous, Clinic/HOD 1 time, 7 of 7 cycles  Administration: 66 mg (1/23/2023), 66 mg (2/13/2023), 66 mg (2/6/2023), 66 mg (2/20/2023), 66 mg (2/27/2023), 66 mg (3/6/2023)       Malignant neoplasm of base of tongue   5/20/2022 Cancer Staged    Staging form: Pharynx - P16 Negative Oropharynx, AJCC 8th Edition  - Clinical stage from 5/20/2022: Stage II (cT2, cN0, cM0, p16-)       6/22/2022 Initial Diagnosis    Primary cancer of base of tongue       7/11/2022 - 8/26/2022 Chemotherapy    Treatment Summary   Plan Name: OP HEAD NECK PEMBROLIZUMAB CARBOPLATIN FLUOROURACIL Q3W FOLLOWED BY MAINTENANCE PEMBROLIZUMAB 400 MG Q6W  Treatment Goal: Palliative  Status: Inactive  Start Date: 7/11/2022  End Date: 8/26/2022  Provider: Aurash Khoobehi, MD  Chemotherapy: CARBOplatin (PARAPLATIN) 440 mg in sodium chloride 0.9% 544 mL chemo infusion, 440 mg (100 % of original dose 438.5 mg), Intravenous, Clinic/HOD 1 time, 3 of 6 cycles  Dose modification:   (original dose 438.5 mg, Cycle 1)  Administration: 440 mg (7/11/2022), 435 mg (8/1/2022), 510 mg (8/22/2022)       1/23/2023 -  Chemotherapy    Treatment Summary   Plan Name: OP HEAD NECK CISPLATIN WEEKLY + RADIOTHERAPY  Treatment Goal: Curative  Status: Active  Start Date: 1/23/2023  End Date: 3/6/2023  Provider: Venu Tamez MD  Chemotherapy: CISplatin (Platinol) 40 mg/m2 = 66 mg in sodium chloride 0.9% 631 mL chemo infusion, 40 mg/m2 = 66 mg, Intravenous, Clinic/HOD 1 time, 7 of 7  cycles  Administration: 66 mg (1/23/2023), 66 mg (2/13/2023), 66 mg (2/6/2023), 66 mg (2/20/2023), 66 mg (2/27/2023), 66 mg (3/6/2023)             HPI   72 y.o. male presents with the above treatment history.  He is now 5 mos status post total glossectomy and ALT free flap reconstruction. No significant complaints.  He completed CRT on 3/9/23.    Review of Systems   Constitutional: Negative for fatigue and unexpected weight change.   HENT: Per HPI.  Eyes: Negative for visual disturbance.   Respiratory: Negative for shortness of breath, hemoptysis   Cardiovascular: Negative for chest pain and palpitations.   Musculoskeletal: Negative for decreased ROM, back pain.   Skin: Negative for rash, sunburn, itching.   Neurological: Negative for dizziness and seizures.   Hematological: Negative for adenopathy. Does not bruise/bleed easily.   Endocrine: Negative for rapid weight loss/weight gain, heat/cold intolerance.     Past Medical History   Patient Active Problem List   Diagnosis    Melanocytic nevus    Body mass index (BMI) of 29.0-29.9 in adult    Benign hypertension    Overweight    Paroxysmal atrial fibrillation    Type 2 diabetes mellitus with diabetic arthropathy, with long-term current use of insulin    Fatty liver disease, nonalcoholic    False positive serological test for hepatitis C    Hyperlipidemia    Type 2 diabetes mellitus with hyperglycemia, without long-term current use of insulin    Gastroesophageal reflux disease without esophagitis    Type 2 diabetes mellitus with hyperglycemia    Vitamin B12 deficiency    Hyperuricemia    Hypovitaminosis D    Proteinuria    On enteral nutrition    Larynx cancer    Dehydration    NSVT (nonsustained ventricular tachycardia)    Adverse effect of adrenal cortical steroids, sequela    S/P laryngectomy    Hypocalcemia    Aphonia    Dysphagia, pharyngoesophageal    Acute pain of both shoulders    Bilateral arm weakness    Oral bleeding    Malignant neoplasm of base of tongue     Advanced care planning/counseling discussion    Iron deficiency anemia due to chronic blood loss    Postoperative hypothyroidism    Pressure injury of sacral region, stage 1    Pneumatosis intestinalis    Nausea and vomiting    Neutropenia    Abnormal CT scan, neck    Open wound of neck    Open wnd of pharynx    Open wound of left thigh    Open wound of mouth    Tracheostomy in place    Malnutrition of mild degree    Type 2 diabetes mellitus, without long-term current use of insulin    Laryngeal cancer    Hypocalcemia    Hyperbilirubinemia    Elevated transaminase level    Hyponatremia    PEG (percutaneous endoscopic gastrostomy) adjustment/replacement/removal    Hypothyroidism    Dehydration    Pharyngocutaneous fistula    Protein malnutrition    Elevated alkaline phosphatase level           Past Surgical History   Past Surgical History:   Procedure Laterality Date    DIRECT LARYNGOBRONCHOSCOPY N/A 12/27/2021    Procedure: LARYNGOSCOPY, DIRECT, WITH BRONCHOSCOPY;  Surgeon: Flex Espinosa MD;  Location: [facility] OR [address] [place] [address];  Service: ENT;  Laterality: N/A;    DIRECT LARYNGOSCOPY  11/9/2022    Procedure: LARYNGOSCOPY, DIRECT;  Surgeon: Jesse James MD;  Location: [facility] OR [address] [place] [address];  Service: ENT;;    DISSECTION OF NECK Bilateral 1/6/2022    Procedure: DISSECTION, NECK;  Surgeon: Jesse James MD;  [facility];  Service: ENT;  Laterality: Bilateral;    DISSECTION OF NECK Bilateral 11/9/2022    Procedure: DISSECTION, NECK;  Surgeon: Jesse James MD;  [facility];  Service: ENT;  Laterality: Bilateral;    FLAP PROCEDURE Right 1/6/2022    Procedure: CREATION, FREE FLAP;  Surgeon: Alise Hart MD;  Location: [facility] OR [address] [place] [address];  Service: ENT;  Laterality: Right;  Ischemic start 1351  Ischemic stop 1502    FLAP PROCEDURE Left 11/9/2022    Procedure: CREATION, FREE FLAP, ALT;  Surgeon: Alise Hart MD;  Location: [facility] OR [address] [place] [address];  Service: ENT;  Laterality: Left;     GLOSSECTOMY Bilateral 11/9/2022    Procedure: TOTAL GLOSSECTOMY;  Surgeon: Jesse James MD;  Location: [facility] OR 2ND FLR;  Service: ENT;  Laterality: Bilateral;    INSERTION OF TUNNELED CENTRAL VENOUS CATHETER (CVC) WITH SUBCUTANEOUS PORT N/A 6/9/2022    Procedure: [id number];  Surgeon: Jesus Viera MD;  Location: [facility] OR;  Service: General;  Laterality: N/A;    INSERTION OF TUNNELED CENTRAL VENOUS CATHETER (CVC) WITH SUBCUTANEOUS PORT Right 1/12/2023    Procedure: [id number];  Surgeon: Abdulaziz Le Jr., MD;  Location: [facility] OR;  Service: General;  Laterality: Right;    LARYNGECTOMY N/A 1/6/2022    Procedure: LARYNGECTOMY;  Surgeon: Jesse James MD;  Location: [facility] OR 2ND FLR;  Service: ENT;  Laterality: N/A;    LARYNGOSCOPY N/A 8/4/2020    Procedure: Suspension microlaryngoscopy with biopsy, possible KTP laser treatment/excision;  Surgeon: Stew Noel MD;  Location: [facility] OR 2ND FLR;  Service: ENT;  Laterality: N/A;  Microscope, telescopes, tower, microinstruments, KTP laser, rep conf# [id number] IC 7/28.    LARYNGOSCOPY N/A 3/16/2021    Procedure: Suspension microlaryngoscopy with excision of lesion, possible CO2 laser;  Surgeon: Stew Noel MD;  [facility];  Service: ENT;  Laterality: N/A;  Microscope, telescopes, tower, microinstruments, CO2 laser, rep conf# [id number] IC 3/4.    LARYNGOSCOPY N/A 4/1/2021    Procedure: Suspension microlaryngoscopy with KTP laser excision of lesion;  Surgeon: Stew Noel MD;  Location: [facility] OR 2ND FLR;  Service: ENT;  Laterality: N/A;  Microscope, telescopes, tower, microinstruments, 70 degree scope, vocal fold , KTP laser, rep conf# [id number] BC    LARYNGOSCOPY N/A 12/9/2021    Procedure: Suspension microlaryngoscopy with biopsy;  Surgeon: Stew Noel MD;  Location: [facility] OR 2ND FLR;  Service: ENT;  Laterality: N/A;  Microscope, telescopes, tower, microinstruments    LARYNGOSCOPY N/A  1/6/2022    Procedure: LARYNGOSCOPY;  Surgeon: Jesse James MD;  Location: [facility] OR 2ND FLR;  Service: ENT;  Laterality: N/A;    LARYNGOSCOPY N/A 4/27/2022    Procedure: LARYNGOSCOPY WITH BIOPSY;  Surgeon: Jesse James MD;  Location: [facility] OR 2ND FLR;  Service: ENT;  Laterality: N/A;    MICROLARYNGOSCOPY N/A 3/17/2020    Procedure: MICROLARYNGOSCOPY;  Surgeon: Jung Xiao MD;  Location: [facility] OR;  Service: ENT;  Laterality: N/A;  Laser Microlaryngoscopy  NEED TO SCHEDULE LASER from [employer] [id number]    PHARYNGECTOMY  11/9/2022    Procedure: TOTAL PHARYNGECTOMY;  Surgeon: Jesse James MD;  Location: [facility] OR 2ND FLR;  Service: ENT;;    REIMPLANTATION OF PARATHYROID TISSUE N/A 1/6/2022    Procedure: REIMPLANTATION, PARATHYROID TISSUE;  Surgeon: Jesse James MD;  Location: [facility] OR 2ND FLR;  Service: ENT;  Laterality: N/A;    SKIN SPLIT GRAFT Right 11/9/2022    Procedure: APPLICATION, GRAFT, SKIN, SPLIT-THICKNESS;  Surgeon: Jesse James MD;  Location: [facility] OR 2ND FLR;  Service: ENT;  Laterality: Right;    THYROIDECTOMY  1/6/2022    Procedure: THYROIDECTOMY;  Surgeon: Jesse James MD;  Location: [facility] OR 2ND FLR;  Service: ENT;;    TRACHEOSTOMY N/A 12/27/2021    Procedure: CREATION, TRACHEOSTOMY;  Surgeon: Flex Espinosa MD;  Location: [facility] OR 2ND FLR;  Service: ENT;  Laterality: N/A;         Family History   Family History   Problem Relation Age of Onset    Abnormal EKG Mother     Diabetes Father     Heart disease Father     Hypertension Father            Social History   .  Social History     Socioeconomic History    Marital status:      Spouse name: Janel Batres    Number of children: 2   Occupational History    Occupation: [employer]     Employer: [employer]   Tobacco Use    Smoking status: Never    Smokeless tobacco: Never   Substance and Sexual Activity    Alcohol use: Not Currently     Comment: occasional    Drug use: No    Sexual activity: Yes      Partners: Female   Social History Narrative    ** Merged History Encounter **         2 children from his 1st wife     Social Determinants of Health     Financial Resource Strain: Low Risk     Difficulty of Paying Living Expenses: Not hard at all   Food Insecurity: No Food Insecurity    Worried About Running Out of Food in the Last Year: Never true    Ran Out of Food in the Last Year: Never true   Transportation Needs: No Transportation Needs    Lack of Transportation (Medical): No    Lack of Transportation (Non-Medical): No   Physical Activity: Insufficiently Active    Days of Exercise per Week: 1 day    Minutes of Exercise per Session: 30 min   Stress: Stress Concern Present    Feeling of Stress : To some extent   Social Connections: Socially Isolated    Frequency of Communication with Friends and Family: Once a week    Frequency of Social Gatherings with Friends and Family: Once a week    Attends [employer] Services: Never    Active Member of Clubs or Organizations: No    Attends Club or Organization Meetings: Never    Marital Status:    Housing Stability: Low Risk     Unable to Pay for Housing in the Last Year: No    Number of Places Lived in the Last Year: 1    Unstable Housing in the Last Year: No         Allergies   Review of patient's allergies indicates:   Allergen Reactions    Lovastatin Itching    Pollen extracts     Lovastatin Rash     Not confirmed but pt skeptical           Physical Exam     Vitals:    04/04/23 1258   Resp: 16           Body mass index is 20.53 kg/m².      General: AOx3, NAD   Respiratory:  Symmetric chest rise, normal effort  Oral Cavity:  Flap healthy. No worrisome lesions.   Oropharynx:  No masses/lesions of the posterior pharyngeal wall. Tonsillar fossa without lesions. Soft palate without masses. Midline uvula.   Neck: Stoma widely patent. Skin paddle healthy with good color and turgor.Well-healed neck incisions.No LAD. Moderate post-op, post-treatment fibrosis. Submental  neck diffusely full.  Face: House Brackmann I bilaterally.    Neopharynx: Clear on scope.      Assessment/Plan  Problem List Items Addressed This Visit          Oncology    Larynx cancer - Primary     ZAY.  RTC 1 month.           Malignant neoplasm of base of tongue

## 2023-04-07 ENCOUNTER — INFUSION (OUTPATIENT)
Dept: INFUSION THERAPY | Facility: HOSPITAL | Age: 73
End: 2023-04-07
Attending: INTERNAL MEDICINE
Payer: MEDICARE

## 2023-04-07 VITALS
OXYGEN SATURATION: 99 % | TEMPERATURE: 98 F | SYSTOLIC BLOOD PRESSURE: 126 MMHG | HEART RATE: 80 BPM | RESPIRATION RATE: 18 BRPM | DIASTOLIC BLOOD PRESSURE: 66 MMHG

## 2023-04-07 DIAGNOSIS — E86.0 DEHYDRATION: Primary | ICD-10-CM

## 2023-04-07 PROCEDURE — 36556 INSERT NON-TUNNEL CV CATH: CPT

## 2023-04-07 PROCEDURE — 25000003 PHARM REV CODE 250: Performed by: NURSE PRACTITIONER

## 2023-04-07 PROCEDURE — 63600175 PHARM REV CODE 636 W HCPCS: Performed by: NURSE PRACTITIONER

## 2023-04-07 RX ORDER — HEPARIN 100 UNIT/ML
500 SYRINGE INTRAVENOUS
Status: DISCONTINUED | OUTPATIENT
Start: 2023-04-07 | End: 2023-04-07 | Stop reason: HOSPADM

## 2023-04-07 RX ORDER — SODIUM CHLORIDE 0.9 % (FLUSH) 0.9 %
10 SYRINGE (ML) INJECTION
Status: DISCONTINUED | OUTPATIENT
Start: 2023-04-07 | End: 2023-04-07 | Stop reason: HOSPADM

## 2023-04-07 RX ORDER — HEPARIN 100 UNIT/ML
500 SYRINGE INTRAVENOUS
Status: CANCELLED | OUTPATIENT
Start: 2023-04-07

## 2023-04-07 RX ORDER — SODIUM CHLORIDE 0.9 % (FLUSH) 0.9 %
10 SYRINGE (ML) INJECTION
Status: CANCELLED | OUTPATIENT
Start: 2023-04-07

## 2023-04-07 RX ADMIN — SODIUM CHLORIDE 1000 ML: 0.9 INJECTION, SOLUTION INTRAVENOUS at 10:04

## 2023-04-07 RX ADMIN — Medication 500 UNITS: at 12:04

## 2023-04-11 ENCOUNTER — PATIENT MESSAGE (OUTPATIENT)
Dept: OPTOMETRY | Facility: CLINIC | Age: 73
End: 2023-04-11

## 2023-04-12 ENCOUNTER — OUTPATIENT CASE MANAGEMENT (OUTPATIENT)
Dept: ADMINISTRATIVE | Facility: OTHER | Age: 73
End: 2023-04-12
Payer: MEDICARE

## 2023-04-12 NOTE — PROGRESS NOTES
Outpatient Care Management  Plan of Care Follow Up Visit    Patient: Cyrus Batres Jr.  MRN: [id number]  Date of Service: 04/12/2023  Completed by: Eugenia Agudelo RN  Referral Date: 09/02/2022    Reason for Visit   Patient presents with    OPCM RN Follow Up Call       Brief Summary: see care plan   Next Steps: follow up

## 2023-04-14 ENCOUNTER — LAB VISIT (OUTPATIENT)
Dept: LAB | Facility: HOSPITAL | Age: 73
End: 2023-04-14
Attending: NURSE PRACTITIONER
Payer: MEDICARE

## 2023-04-14 ENCOUNTER — OFFICE VISIT (OUTPATIENT)
Dept: OPTOMETRY | Facility: CLINIC | Age: 73
End: 2023-04-14
Payer: MEDICARE

## 2023-04-14 ENCOUNTER — PATIENT MESSAGE (OUTPATIENT)
Dept: HEMATOLOGY/ONCOLOGY | Facility: CLINIC | Age: 73
End: 2023-04-14

## 2023-04-14 DIAGNOSIS — H52.7 REFRACTIVE ERROR: ICD-10-CM

## 2023-04-14 DIAGNOSIS — C01 MALIGNANT NEOPLASM OF BASE OF TONGUE: ICD-10-CM

## 2023-04-14 DIAGNOSIS — E11.9 DIABETES MELLITUS TYPE 2 WITHOUT RETINOPATHY: Primary | ICD-10-CM

## 2023-04-14 DIAGNOSIS — H25.13 NUCLEAR SCLEROSIS, BILATERAL: ICD-10-CM

## 2023-04-14 DIAGNOSIS — H25.041 POSTERIOR SUBCAPSULAR AGE-RELATED CATARACT, RIGHT EYE: ICD-10-CM

## 2023-04-14 DIAGNOSIS — H40.013 OPEN ANGLE WITH BORDERLINE FINDINGS OF BOTH EYES: ICD-10-CM

## 2023-04-14 LAB
ALBUMIN SERPL BCP-MCNC: 4.2 G/DL (ref 3.5–5.2)
ALP SERPL-CCNC: 300 U/L (ref 55–135)
ALT SERPL W/O P-5'-P-CCNC: 49 U/L (ref 10–44)
ANION GAP SERPL CALC-SCNC: 7 MMOL/L (ref 8–16)
AST SERPL-CCNC: 44 U/L (ref 10–40)
BASOPHILS # BLD AUTO: 0.03 K/UL (ref 0–0.2)
BASOPHILS NFR BLD: 0.8 % (ref 0–1.9)
BILIRUB SERPL-MCNC: 0.7 MG/DL (ref 0.1–1)
BUN SERPL-MCNC: 24 MG/DL (ref 8–23)
CALCIUM SERPL-MCNC: 7.5 MG/DL (ref 8.7–10.5)
CHLORIDE SERPL-SCNC: 104 MMOL/L (ref 95–110)
CO2 SERPL-SCNC: 27 MMOL/L (ref 23–29)
CREAT SERPL-MCNC: 0.9 MG/DL (ref 0.5–1.4)
DIFFERENTIAL METHOD: ABNORMAL
EOSINOPHIL # BLD AUTO: 0.1 K/UL (ref 0–0.5)
EOSINOPHIL NFR BLD: 3.8 % (ref 0–8)
ERYTHROCYTE [DISTWIDTH] IN BLOOD BY AUTOMATED COUNT: 15.9 % (ref 11.5–14.5)
EST. GFR  (NO RACE VARIABLE): >60 ML/MIN/1.73 M^2
GLUCOSE SERPL-MCNC: 121 MG/DL (ref 70–110)
HCT VFR BLD AUTO: 29.4 % (ref 40–54)
HGB BLD-MCNC: 9.7 G/DL (ref 14–18)
IMM GRANULOCYTES # BLD AUTO: 0.02 K/UL (ref 0–0.04)
IMM GRANULOCYTES NFR BLD AUTO: 0.5 % (ref 0–0.5)
LYMPHOCYTES # BLD AUTO: 0.6 K/UL (ref 1–4.8)
LYMPHOCYTES NFR BLD: 15.9 % (ref 18–48)
MCH RBC QN AUTO: 33.7 PG (ref 27–31)
MCHC RBC AUTO-ENTMCNC: 33 G/DL (ref 32–36)
MCV RBC AUTO: 102 FL (ref 82–98)
MONOCYTES # BLD AUTO: 0.4 K/UL (ref 0.3–1)
MONOCYTES NFR BLD: 10.2 % (ref 4–15)
NEUTROPHILS # BLD AUTO: 2.6 K/UL (ref 1.8–7.7)
NEUTROPHILS NFR BLD: 68.8 % (ref 38–73)
NRBC BLD-RTO: 0 /100 WBC
PLATELET # BLD AUTO: 151 K/UL (ref 150–450)
PMV BLD AUTO: 10.9 FL (ref 9.2–12.9)
POTASSIUM SERPL-SCNC: 3.6 MMOL/L (ref 3.5–5.1)
PROT SERPL-MCNC: 7.1 G/DL (ref 6–8.4)
RBC # BLD AUTO: 2.88 M/UL (ref 4.6–6.2)
SODIUM SERPL-SCNC: 138 MMOL/L (ref 136–145)
WBC # BLD AUTO: 3.72 K/UL (ref 3.9–12.7)

## 2023-04-14 PROCEDURE — 80053 COMPREHEN METABOLIC PANEL: CPT | Performed by: NURSE PRACTITIONER

## 2023-04-14 PROCEDURE — 1126F AMNT PAIN NOTED NONE PRSNT: CPT | Mod: CPTII,S$GLB,, | Performed by: OPTOMETRIST

## 2023-04-14 PROCEDURE — 92014 COMPRE OPH EXAM EST PT 1/>: CPT | Mod: S$GLB,,, | Performed by: OPTOMETRIST

## 2023-04-14 PROCEDURE — 92014 PR EYE EXAM, EST PATIENT,COMPREHESV: ICD-10-PCS | Mod: S$GLB,,, | Performed by: OPTOMETRIST

## 2023-04-14 PROCEDURE — 1159F MED LIST DOCD IN RCRD: CPT | Mod: CPTII,S$GLB,, | Performed by: OPTOMETRIST

## 2023-04-14 PROCEDURE — 2023F DILAT RTA XM W/O RTNOPTHY: CPT | Mod: CPTII,S$GLB,, | Performed by: OPTOMETRIST

## 2023-04-14 PROCEDURE — 1101F PT FALLS ASSESS-DOCD LE1/YR: CPT | Mod: CPTII,S$GLB,, | Performed by: OPTOMETRIST

## 2023-04-14 PROCEDURE — 1160F RVW MEDS BY RX/DR IN RCRD: CPT | Mod: CPTII,S$GLB,, | Performed by: OPTOMETRIST

## 2023-04-14 PROCEDURE — 3288F FALL RISK ASSESSMENT DOCD: CPT | Mod: CPTII,S$GLB,, | Performed by: OPTOMETRIST

## 2023-04-14 PROCEDURE — 99999 PR PBB SHADOW E&M-EST. PATIENT-LVL III: ICD-10-PCS | Mod: PBBFAC,,, | Performed by: OPTOMETRIST

## 2023-04-14 PROCEDURE — 1101F PR PT FALLS ASSESS DOC 0-1 FALLS W/OUT INJ PAST YR: ICD-10-PCS | Mod: CPTII,S$GLB,, | Performed by: OPTOMETRIST

## 2023-04-14 PROCEDURE — 3288F PR FALLS RISK ASSESSMENT DOCUMENTED: ICD-10-PCS | Mod: CPTII,S$GLB,, | Performed by: OPTOMETRIST

## 2023-04-14 PROCEDURE — 99999 PR PBB SHADOW E&M-EST. PATIENT-LVL III: CPT | Mod: PBBFAC,,, | Performed by: OPTOMETRIST

## 2023-04-14 PROCEDURE — 1159F PR MEDICATION LIST DOCUMENTED IN MEDICAL RECORD: ICD-10-PCS | Mod: CPTII,S$GLB,, | Performed by: OPTOMETRIST

## 2023-04-14 PROCEDURE — 36415 COLL VENOUS BLD VENIPUNCTURE: CPT | Performed by: NURSE PRACTITIONER

## 2023-04-14 PROCEDURE — 1126F PR PAIN SEVERITY QUANTIFIED, NO PAIN PRESENT: ICD-10-PCS | Mod: CPTII,S$GLB,, | Performed by: OPTOMETRIST

## 2023-04-14 PROCEDURE — 2023F PR DILATED RETINAL EXAM W/O EVID OF RETINOPATHY: ICD-10-PCS | Mod: CPTII,S$GLB,, | Performed by: OPTOMETRIST

## 2023-04-14 PROCEDURE — 1160F PR REVIEW ALL MEDS BY PRESCRIBER/CLIN PHARMACIST DOCUMENTED: ICD-10-PCS | Mod: CPTII,S$GLB,, | Performed by: OPTOMETRIST

## 2023-04-14 PROCEDURE — 85025 COMPLETE CBC W/AUTO DIFF WBC: CPT | Performed by: NURSE PRACTITIONER

## 2023-04-14 NOTE — PROGRESS NOTES
HPI    Pt here today for refraction only to update specs rx.  States decreased   vision at both near & distance -- OD >> OS.     Pt had DFE 11-22 while in [facility].  Notes OD vision really foggy    Denies any headaches or eye pain.    Hemoglobin A1C       Date                     Value               Ref Range             Status                11/22/2022               5.8                 4.5 - 6.2 %           Final                 11/09/2022               5.7 (H)             4.0 - 5.6 %           Final                 05/15/2022               6.6 (H)             4.0 - 5.6 %           Final            BSL controlled.    (-) allergies / dry eyes  (-) gtts  (+) floaters OD only -- no light flashes    Doing well with specs, no double vision      Last edited by Zhen Pham, OD on 4/14/2023  1:33 PM.            Assessment /Plan     For exam results, see Encounter Report.    Diabetes mellitus type 2 without retinopathy    Nuclear sclerosis, bilateral    Posterior subcapsular age-related cataract, right eye  -     Ambulatory referral/consult to Ophthalmology; Future; Expected date: 05/14/2023    Refractive error    Open angle with borderline findings of both eyes      1. Diabetes mellitus type 2 without retinopathy  Discussed possible ocular affects of uncontrolled blood sugar with patient. Recommended continued strong blood sugar control and continued care with PCP. Monitor yearly.     2. Nuclear sclerosis, bilateral  3. Posterior subcapsular age-related cataract, right eye  Moderate NS, VS PSC OD  Discussed options - referred to Dr. Munoz for Eval    - Ambulatory referral/consult to Ophthalmology; Future    4. Refractive error  No spec rx at this time, no improvement with refraction    5. Open angle with borderline findings of both eyes  + fam hx in father  Thin rim, mild excavation OS > OD  Return for glc workup with Dr. Munoz s/p cataract extraction OD

## 2023-04-19 ENCOUNTER — INFUSION (OUTPATIENT)
Dept: INFUSION THERAPY | Facility: HOSPITAL | Age: 73
End: 2023-04-19
Attending: INTERNAL MEDICINE
Payer: MEDICARE

## 2023-04-19 VITALS
HEIGHT: 66 IN | RESPIRATION RATE: 18 BRPM | DIASTOLIC BLOOD PRESSURE: 54 MMHG | SYSTOLIC BLOOD PRESSURE: 87 MMHG | TEMPERATURE: 98 F | HEART RATE: 85 BPM | BODY MASS INDEX: 20.65 KG/M2 | OXYGEN SATURATION: 100 % | WEIGHT: 128.5 LBS

## 2023-04-19 DIAGNOSIS — C01 MALIGNANT NEOPLASM OF BASE OF TONGUE: Primary | ICD-10-CM

## 2023-04-19 DIAGNOSIS — E86.0 DEHYDRATION: ICD-10-CM

## 2023-04-19 DIAGNOSIS — C32.9 LARYNX CANCER: ICD-10-CM

## 2023-04-19 PROCEDURE — 25000003 PHARM REV CODE 250: Performed by: INTERNAL MEDICINE

## 2023-04-19 PROCEDURE — A4216 STERILE WATER/SALINE, 10 ML: HCPCS | Performed by: INTERNAL MEDICINE

## 2023-04-19 PROCEDURE — 63600175 PHARM REV CODE 636 W HCPCS: Performed by: INTERNAL MEDICINE

## 2023-04-19 PROCEDURE — 96523 IRRIG DRUG DELIVERY DEVICE: CPT

## 2023-04-19 RX ORDER — HEPARIN 100 UNIT/ML
500 SYRINGE INTRAVENOUS
Status: DISCONTINUED | OUTPATIENT
Start: 2023-04-19 | End: 2023-04-19 | Stop reason: HOSPADM

## 2023-04-19 RX ORDER — SODIUM CHLORIDE 0.9 % (FLUSH) 0.9 %
10 SYRINGE (ML) INJECTION
Status: DISCONTINUED | OUTPATIENT
Start: 2023-04-19 | End: 2023-04-19 | Stop reason: HOSPADM

## 2023-04-19 RX ORDER — HEPARIN 100 UNIT/ML
500 SYRINGE INTRAVENOUS
Status: CANCELLED | OUTPATIENT
Start: 2023-04-19

## 2023-04-19 RX ORDER — SODIUM CHLORIDE 0.9 % (FLUSH) 0.9 %
10 SYRINGE (ML) INJECTION
Status: CANCELLED | OUTPATIENT
Start: 2023-04-19

## 2023-04-19 RX ADMIN — HEPARIN 500 UNITS: 100 SYRINGE at 01:04

## 2023-04-19 RX ADMIN — SODIUM CHLORIDE, PRESERVATIVE FREE 10 ML: 5 INJECTION INTRAVENOUS at 01:04

## 2023-04-28 ENCOUNTER — LAB VISIT (OUTPATIENT)
Dept: LAB | Facility: HOSPITAL | Age: 73
End: 2023-04-28
Attending: NURSE PRACTITIONER
Payer: MEDICARE

## 2023-04-28 ENCOUNTER — PATIENT MESSAGE (OUTPATIENT)
Dept: HEMATOLOGY/ONCOLOGY | Facility: CLINIC | Age: 73
End: 2023-04-28

## 2023-04-28 DIAGNOSIS — C01 MALIGNANT NEOPLASM OF BASE OF TONGUE: ICD-10-CM

## 2023-04-28 LAB
ALBUMIN SERPL BCP-MCNC: 4.1 G/DL (ref 3.5–5.2)
ALP SERPL-CCNC: 272 U/L (ref 55–135)
ALT SERPL W/O P-5'-P-CCNC: 37 U/L (ref 10–44)
ANION GAP SERPL CALC-SCNC: 7 MMOL/L (ref 8–16)
AST SERPL-CCNC: 36 U/L (ref 10–40)
BASOPHILS # BLD AUTO: 0.02 K/UL (ref 0–0.2)
BASOPHILS NFR BLD: 0.5 % (ref 0–1.9)
BILIRUB SERPL-MCNC: 1.2 MG/DL (ref 0.1–1)
BUN SERPL-MCNC: 24 MG/DL (ref 8–23)
CALCIUM SERPL-MCNC: 7 MG/DL (ref 8.7–10.5)
CHLORIDE SERPL-SCNC: 106 MMOL/L (ref 95–110)
CO2 SERPL-SCNC: 27 MMOL/L (ref 23–29)
CREAT SERPL-MCNC: 1 MG/DL (ref 0.5–1.4)
DIFFERENTIAL METHOD: ABNORMAL
EOSINOPHIL # BLD AUTO: 0.2 K/UL (ref 0–0.5)
EOSINOPHIL NFR BLD: 3.9 % (ref 0–8)
ERYTHROCYTE [DISTWIDTH] IN BLOOD BY AUTOMATED COUNT: 13.6 % (ref 11.5–14.5)
EST. GFR  (NO RACE VARIABLE): >60 ML/MIN/1.73 M^2
GLUCOSE SERPL-MCNC: 113 MG/DL (ref 70–110)
HCT VFR BLD AUTO: 30.9 % (ref 40–54)
HGB BLD-MCNC: 10.2 G/DL (ref 14–18)
IMM GRANULOCYTES # BLD AUTO: 0.02 K/UL (ref 0–0.04)
IMM GRANULOCYTES NFR BLD AUTO: 0.5 % (ref 0–0.5)
LYMPHOCYTES # BLD AUTO: 0.7 K/UL (ref 1–4.8)
LYMPHOCYTES NFR BLD: 17.7 % (ref 18–48)
MCH RBC QN AUTO: 33.2 PG (ref 27–31)
MCHC RBC AUTO-ENTMCNC: 33 G/DL (ref 32–36)
MCV RBC AUTO: 101 FL (ref 82–98)
MONOCYTES # BLD AUTO: 0.4 K/UL (ref 0.3–1)
MONOCYTES NFR BLD: 9.4 % (ref 4–15)
NEUTROPHILS # BLD AUTO: 2.8 K/UL (ref 1.8–7.7)
NEUTROPHILS NFR BLD: 68 % (ref 38–73)
NRBC BLD-RTO: 0 /100 WBC
PLATELET # BLD AUTO: 140 K/UL (ref 150–450)
PMV BLD AUTO: 10.5 FL (ref 9.2–12.9)
POTASSIUM SERPL-SCNC: 3.7 MMOL/L (ref 3.5–5.1)
PROT SERPL-MCNC: 7.1 G/DL (ref 6–8.4)
RBC # BLD AUTO: 3.07 M/UL (ref 4.6–6.2)
SODIUM SERPL-SCNC: 140 MMOL/L (ref 136–145)
WBC # BLD AUTO: 4.13 K/UL (ref 3.9–12.7)

## 2023-04-28 PROCEDURE — 36415 COLL VENOUS BLD VENIPUNCTURE: CPT | Performed by: NURSE PRACTITIONER

## 2023-04-28 PROCEDURE — 80053 COMPREHEN METABOLIC PANEL: CPT | Performed by: NURSE PRACTITIONER

## 2023-04-28 PROCEDURE — 85025 COMPLETE CBC W/AUTO DIFF WBC: CPT | Performed by: NURSE PRACTITIONER

## 2023-05-01 ENCOUNTER — INFUSION (OUTPATIENT)
Dept: INFUSION THERAPY | Facility: HOSPITAL | Age: 73
End: 2023-05-01
Attending: INTERNAL MEDICINE
Payer: MEDICARE

## 2023-05-01 ENCOUNTER — OFFICE VISIT (OUTPATIENT)
Dept: HEMATOLOGY/ONCOLOGY | Facility: CLINIC | Age: 73
End: 2023-05-01
Payer: MEDICARE

## 2023-05-01 VITALS
HEART RATE: 69 BPM | WEIGHT: 128.19 LBS | HEIGHT: 66 IN | RESPIRATION RATE: 18 BRPM | BODY MASS INDEX: 20.6 KG/M2 | TEMPERATURE: 98 F | SYSTOLIC BLOOD PRESSURE: 124 MMHG | DIASTOLIC BLOOD PRESSURE: 53 MMHG | OXYGEN SATURATION: 99 %

## 2023-05-01 VITALS
HEART RATE: 77 BPM | HEIGHT: 66 IN | DIASTOLIC BLOOD PRESSURE: 58 MMHG | WEIGHT: 128.19 LBS | RESPIRATION RATE: 18 BRPM | BODY MASS INDEX: 20.6 KG/M2 | TEMPERATURE: 97 F | SYSTOLIC BLOOD PRESSURE: 102 MMHG | OXYGEN SATURATION: 100 %

## 2023-05-01 DIAGNOSIS — E86.0 DEHYDRATION: Primary | ICD-10-CM

## 2023-05-01 DIAGNOSIS — E83.51 HYPOCALCEMIA: ICD-10-CM

## 2023-05-01 DIAGNOSIS — D50.0 IRON DEFICIENCY ANEMIA DUE TO CHRONIC BLOOD LOSS: ICD-10-CM

## 2023-05-01 DIAGNOSIS — C01 MALIGNANT NEOPLASM OF BASE OF TONGUE: Primary | ICD-10-CM

## 2023-05-01 PROCEDURE — 3008F PR BODY MASS INDEX (BMI) DOCUMENTED: ICD-10-PCS | Mod: CPTII,S$GLB,, | Performed by: NURSE PRACTITIONER

## 2023-05-01 PROCEDURE — A4216 STERILE WATER/SALINE, 10 ML: HCPCS | Performed by: NURSE PRACTITIONER

## 2023-05-01 PROCEDURE — 25000003 PHARM REV CODE 250: Performed by: NURSE PRACTITIONER

## 2023-05-01 PROCEDURE — 3074F SYST BP LT 130 MM HG: CPT | Mod: CPTII,S$GLB,, | Performed by: NURSE PRACTITIONER

## 2023-05-01 PROCEDURE — 99214 PR OFFICE/OUTPT VISIT, EST, LEVL IV, 30-39 MIN: ICD-10-PCS | Mod: S$GLB,,, | Performed by: NURSE PRACTITIONER

## 2023-05-01 PROCEDURE — 3074F PR MOST RECENT SYSTOLIC BLOOD PRESSURE < 130 MM HG: ICD-10-PCS | Mod: CPTII,S$GLB,, | Performed by: NURSE PRACTITIONER

## 2023-05-01 PROCEDURE — 1126F PR PAIN SEVERITY QUANTIFIED, NO PAIN PRESENT: ICD-10-PCS | Mod: CPTII,S$GLB,, | Performed by: NURSE PRACTITIONER

## 2023-05-01 PROCEDURE — 3008F BODY MASS INDEX DOCD: CPT | Mod: CPTII,S$GLB,, | Performed by: NURSE PRACTITIONER

## 2023-05-01 PROCEDURE — 96365 THER/PROPH/DIAG IV INF INIT: CPT

## 2023-05-01 PROCEDURE — 1126F AMNT PAIN NOTED NONE PRSNT: CPT | Mod: CPTII,S$GLB,, | Performed by: NURSE PRACTITIONER

## 2023-05-01 PROCEDURE — 99214 OFFICE O/P EST MOD 30 MIN: CPT | Mod: S$GLB,,, | Performed by: NURSE PRACTITIONER

## 2023-05-01 PROCEDURE — 96361 HYDRATE IV INFUSION ADD-ON: CPT

## 2023-05-01 PROCEDURE — 3078F PR MOST RECENT DIASTOLIC BLOOD PRESSURE < 80 MM HG: ICD-10-PCS | Mod: CPTII,S$GLB,, | Performed by: NURSE PRACTITIONER

## 2023-05-01 PROCEDURE — 63600175 PHARM REV CODE 636 W HCPCS: Performed by: NURSE PRACTITIONER

## 2023-05-01 PROCEDURE — 3078F DIAST BP <80 MM HG: CPT | Mod: CPTII,S$GLB,, | Performed by: NURSE PRACTITIONER

## 2023-05-01 PROCEDURE — 96366 THER/PROPH/DIAG IV INF ADDON: CPT

## 2023-05-01 RX ORDER — HEPARIN 100 UNIT/ML
500 SYRINGE INTRAVENOUS
Status: CANCELLED | OUTPATIENT
Start: 2023-05-01

## 2023-05-01 RX ORDER — SODIUM CHLORIDE 0.9 % (FLUSH) 0.9 %
10 SYRINGE (ML) INJECTION
Status: DISCONTINUED | OUTPATIENT
Start: 2023-05-01 | End: 2023-05-01 | Stop reason: HOSPADM

## 2023-05-01 RX ORDER — HEPARIN 100 UNIT/ML
500 SYRINGE INTRAVENOUS
Status: DISCONTINUED | OUTPATIENT
Start: 2023-05-01 | End: 2023-05-01 | Stop reason: HOSPADM

## 2023-05-01 RX ORDER — CALCIUM CARBONATE 1250 MG/5ML
1000 SUSPENSION ORAL
Qty: 473 ML | Refills: 12 | Status: SHIPPED | OUTPATIENT
Start: 2023-05-01 | End: 2023-06-15 | Stop reason: SDUPTHER

## 2023-05-01 RX ORDER — SODIUM CHLORIDE 0.9 % (FLUSH) 0.9 %
10 SYRINGE (ML) INJECTION
Status: CANCELLED | OUTPATIENT
Start: 2023-05-01

## 2023-05-01 RX ADMIN — HEPARIN 500 UNITS: 100 SYRINGE at 01:05

## 2023-05-01 RX ADMIN — SODIUM CHLORIDE 1000 ML: 0.9 INJECTION, SOLUTION INTRAVENOUS at 11:05

## 2023-05-01 RX ADMIN — SODIUM CHLORIDE, PRESERVATIVE FREE 10 ML: 5 INJECTION INTRAVENOUS at 01:05

## 2023-05-01 RX ADMIN — CALCIUM GLUCONATE 2 G: 98 INJECTION, SOLUTION INTRAVENOUS at 11:05

## 2023-05-01 NOTE — PLAN OF CARE
Problem: Fatigue  Goal: Improved Activity Tolerance  Outcome: Ongoing, Progressing  Intervention: Promote Improved Energy  Flowsheets (Taken 5/1/2023 1114)  Fatigue Management:   fatigue-related activity identified   paced activity encouraged   frequent rest breaks encouraged  Sleep/Rest Enhancement:   noise level reduced   relaxation techniques promoted   regular sleep/rest pattern promoted  Activity Management:   Up in chair - L3   Ambulated -L4

## 2023-05-01 NOTE — PROGRESS NOTES
[facility] Hematology/Oncology  PROGRESS NOTE -  Follow-up Visit      Subjective:       Patient ID:   NAME: Cyrus Batres Jr. : 1950     72 y.o. 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  The patient is here today to go over the results of the recently ordered labs, tests and studies. He is here by himself.    He has since completed chemotherapy and  XRT.  He seems to have tolerated the regimen fairly well with no new complaints.  Some weight loss but reports he is staying hydrated. He does have some occasional GRIFFIN.     He saw Dr. James and he reported ZAY.     PET is due end of May     Issues with hypocalcemia - will increase the G tube dosing and talk to endocrine.       He previously saw Dr Lucero with Rad/onc, and Dr James and his case was presented to H&N Tumor Board at 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 He had his portacath removed on  and replaced on 2023 by Dr Le    He is breathing ok. No HA's or CP; no pain at this time        Discussed covid19 and he has been vaccinated      ROS:   GEN: normal without any fever, night sweats or weight loss; doing well with tube feeds   HEENT: normal with no HA's, sore throat, stiff neck, changes in vision  CV: normal with no CP, SOB, PND, some occasional GRIFFIN  PULM: normal with no SOB, cough, hemoptysis, sputum or pleuritic pain  GI: no current N/V  ; has peg tube;    : normal with no hematuria, dysuria  BREAST:  normal with no mass, discharge, pain  SKIN: normal with no rash, erythema, bruising, or swelling       Answers submitted by the patient for this visit:  Review of Systems Questionnaire (Submitted on 4/30/2023)  appetite change : No  unexpected weight change: No  mouth sores: No  visual disturbance: No  cough: Yes  shortness of breath: No  chest pain: No  abdominal pain: No  diarrhea: No  frequency: No  back pain: No  rash: No  headaches: No  adenopathy: No  nervous/ anxious: No    Pain Scale:  0    Allergies:  Review of patient's allergies indicates:   Allergen Reactions    Lovastatin Itching    Pollen extracts     Lovastatin Rash     Not confirmed but pt skeptical       Medications:    Current Outpatient Medications:     acetaminophen (TYLENOL) 325 MG tablet, Take 325 mg by mouth every 6 (six) hours as needed for Pain., Disp: , Rfl:     calcitrioL (ROCALTROL) 1 mcg/mL solution, Take 0.25 mLs (0.25 mcg total) by Per G Tube route once daily., Disp: 15 mL, Rfl: 12    calcium carbonate (TUMS) 200 mg calcium (500 mg) chewable tablet, 2 tablets (1,000 mg total) by Per G Tube route 5 (five) times daily., Disp: 300 tablet, Rfl: 11    calcium carbonate 500 mg/5 mL (1,250 mg/5 mL), 10 mLs (1,000 mg total) by Per G Tube route 4 (four) times daily with meals and nightly., Disp: 473 mL, Rfl: 12    esomeprazole (NEXIUM) 40 MG capsule, 40 mg before breakfast., Disp: , Rfl:     famotidine (PEPCID) 40 mg/5 mL (8 mg/mL) suspension, Give 2.5 mLs (20 mg total) by Per G Tube route 2 (two) times daily., Disp: 50 mL, Rfl: 11    ibuprofen (ADVIL,MOTRIN) 200 MG tablet, Take 200 mg by mouth every 6 (six) hours as needed for Pain., Disp: , Rfl:     lactose-reduced food with fibr (JEVITY 1.5 TRISHA) 0.06 gram-1.5 kcal/mL Liqd, 6 cartons per day via peg.  Flush 60ml before and after each bolus, Disp: 180 each, Rfl: 11    levothyroxine (SYNTHROID) 100 MCG tablet, 1 tablet (100 mcg total) by Per G Tube route before breakfast., Disp: 30 tablet, Rfl:  11    losartan (COZAAR) 100 MG tablet, Take 0.5 tablets (50 mg total) by mouth once daily. (Patient taking differently: Take 50 mg by mouth every evening.), Disp: 45 tablet, Rfl: 3    metFORMIN (GLUCOPHAGE) 500 MG tablet, Take 1 tablet (500 mg total) by mouth 2 (two) times daily with meals., Disp: 60 tablet, Rfl: 3    promethazine (PHENERGAN) 25 MG tablet, Take 1 tablet (25 mg total) by mouth every 4 to 6 hours as needed for Nausea., Disp: 30 tablet, Rfl: 2    traZODone (DESYREL) 50 MG tablet, TAKE 1 TABLET BY MOUTH NIGHTLY AS NEEDED FOR INSOMNIA., Disp: 90 tablet, Rfl: 1    zolpidem (AMBIEN) 5 MG Tab, 1 tablet (5 mg total) by Per G Tube route nightly as needed (difficult with sleep)., Disp: 30 tablet, Rfl: 0  No current facility-administered medications for this visit.    Facility-Administered Medications Ordered in Other Visits:     calcium gluconate 2 g in dextrose 5 % (D5W) 120 mL IVPB, 2 g, Intravenous, 1 time in Clinic/HOD, Briana Martinez NP, Last Rate: 60 mL/hr at 05/01/23 1143, 2 g at 05/01/23 1143    heparin, porcine (PF) 100 unit/mL injection flush 500 Units, 500 Units, Intravenous, PRN, Briana Martinez NP    sodium chloride 0.9% bolus 1,000 mL 1,000 mL, 1,000 mL, Intravenous, 1 time in Clinic/HOD, Briana Martinez NP, Last Rate: 500 mL/hr at 05/01/23 1127, 1,000 mL at 05/01/23 1127    sodium chloride 0.9% flush 10 mL, 10 mL, Intravenous, PRN, Briana Martinez NP    PMHx/PSHx Updates:  See patient's last visit with Dr. Tamez on 4/3/2023  See H&P on 9/23/2022        Pathology:   Cancer Staging   Larynx cancer  Staging form: Larynx - Glottis, AJCC 8th Edition  - Clinical stage from 8/6/2020: Stage II (cT2, cN0, cM0) - Signed by Fariha Muñoz NP on 8/6/2020  - Pathologic stage from 1/20/2022: Stage LOU (pT4a, pN0, cM0) - Signed by Fariha Muñoz NP on 1/20/2022  Staging form: Pharynx - P16 Negative Oropharynx, AJCC 8th Edition  - Clinical: Stage II (rcT2, cN0, cM0) - Signed by Matheus Portillo  "MD Keon on 5/30/2022    Malignant neoplasm of base of tongue  Staging form: Pharynx - P16 Negative Oropharynx, AJCC 8th Edition  - Clinical stage from 5/20/2022: Stage II (cT2, cN0, cM0, p16-) - Signed by Saúl Kessler MD on 7/7/2022    Dissection 11/9/2022:    Final Pathologic Diagnosis 1. "left submandibular lymph node", dissection:       - Three lymph nodes, negative for carcinoma (0/3)   2. 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       - Tumor size: 4.5 cm       - Resection margins: left superior pharyngeal margin focally involved;   all other margins are negative for carcinoma       - Left internal jugular vein: free of tumor       - One lymph node, negative for carcinoma (0/1)   Note: P16 immunostain is negative     Tumor Site       Oropharynx  involving Base of tongue       Tumor Laterality       Midline       Tumor Size       Greatest Dimension (Centimeters) 4.5 cm         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)       Histologic Grade       G2 to G3: Moderate to Poorly differentiated       Lymphovascular Invasion       Not identified       Perineural Invasion       Not identified     MARGINS      Margins       Involved by invasive tumor     Margin(s)   Involved by Invasive Tumor:      left superior pharyngeal margin; all other   margins are free of tumor     LYMPH NODES    Regional Lymph Node Status:    :     Regional Lymph Node   Status:      All regional lymph nodes negative for tumor      pT Category:               pT3   pN Category:               pN0      Base of Tongue Biopsy  4/27/2022:  Final Pathologic Diagnosis BIOPSY OF BASE OF THE TONGUE:   THE INFILTRATING POORLY DIFFERENTIATED SQUAMOUS CELL CARCINOMA   THE TUMOR IS P16 NEGATIVE.  THE POSITIVE AND NEGATIVE CONTROLS STAINED   APPROPRIATELY      Larynx resection/thyroidectomy 1/6/2022:     Final Pathologic Diagnosis " 1. Lymph nodes, left neck levels 2,  3 and 4, dissection:   - Nineteen lymph nodes, all  negative  for metastatic carcinoma (0/19)   2. Lymph nodes, right neck levels 2,  3 and 4, dissection:   - Twenty-eight lymph nodes, all  negative  for metastatic carcinoma (0/28)   3. Possible parathyroid gland, excision:   - Benign parathyroid gland tissue (0.003 g)   4. 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 Soft tissue, posterior tracheal margin, excision:   - Benign, focally reactive respiratory mucosa with submucosal acute   inflammation,  negative  for dysplasia or malignancy   6. Soft tissue, anterior tracheal margin, excision:   - Benign respiratory mucosa with subepithelial cartilage,  negative  for   dysplasia or malignancy   7. Soft tissue, posterior tracheal margin #2, excision:   - Benign, focally reactive respiratory mucosa with submucosal acute   inflammation,  negative  for dysplasia or malignancy   8. Soft tissue, anterior tracheal margin #2, excision:   - Benign, reactive focally ulcerated respiratory mucosa with submucosal acute   inflammation,  negative  for dysplasia or malignancy             Laryngoscope 8/4/2020: (with Dr Noel):  Final Pathologic Diagnosis 1.  Left true vocal fold, biopsy:       -  Invasive moderately differentiated squamous cell carcinoma,   keratinizing type   2.  Left true vocal fold, biopsy:       -  Invasive moderately differentiated squamous cell carcinoma,   keratinizing type             Laryngoscope 3/17/2020  "(with Dr Xiao):  Final Pathologic Diagnosis 1.  BIOPSY OF LEFT TRUE VOCAL CORD:   SEVERELY DYSPLASTIC APPEARING SQUAMOUS MUCOSA   INVASIVE CARCINOMA IS NOT DOCUMENTED   ON THE OTHER HAND, THESE FRAGMENTS ARE NODUL AND WITHOUT SUBMUCOSA FOR   EVALUATION; IT IS POSSIBLE THAT THEY REFLECT INVASIVE SQUAMOUS CARCINOMA   2.  BIOPSY OF LEFT ANTERIOR COMMISSURE:   MODERATE DYSPLASIA   NO INVASIVE CARCINOMA IDENTIFIED             Objective:     Vitals:    Vital Signs - Last Recorded  Most recent update: 5/1/2023 11:27 AM  BP   102/58 Abnormal    (Patient Position: Sitting)       Pulse   77       Temp   97.3 °F (36.3 °C)       Resp   18       Ht   5' 6" (1.676 m)         Wt   58.2 kg (128 lb 3.2 oz)    SpO2   100%    BMI   20.69 kg/m²        Physical Examination:   GEN: no apparent distress, comfortable; AAOx3  HEAD: atraumatic and normocephalic  EYES: no pallor, no icterus, PERRLA  ENT: OMM, no pharyngeal erythema, external ears WNL; no nasal discharge; no thrush; s/p laryngectomy with flap since healed well; trach   NECK: no masses, thyroid normal, trachea midline, no LAD/LN's, supple; post-op dissection healed well;    CV: RRR with no murmur; normal pulse; normal S1 and S2; no pedal edema; portacath   CHEST: Normal respiratory effort; CTAB; normal breath sounds; no wheeze or crackles  ABDOM: nontender and nondistended; soft; normal bowel sounds; no rebound/guarding; peg tube  MUSC/Skeletal: ROM normal; no crepitus; joints normal; no deformities or arthropathy  EXTREM: no clubbing, cyanosis, inflammation or swelling  SKIN: no rashes, lesions, ulcers, petechiae or subcutaneous nodules  : no mahan  NEURO: grossly intact; motor/sensory WNL; AAOx3; no tremors  PSYCH: normal mood, affect and behavior  LYMPH: normal cervical, supraclavicular, axillary and groin LN's          Labs:     Lab Results   Component Value Date    WBC 4.13 04/28/2023    HGB 10.2 (L) 04/28/2023    HCT 30.9 (L) 04/28/2023     (H) 04/28/2023    PLT " 140 (L) 04/28/2023     CMP  Sodium   Date Value Ref Range Status   04/28/2023 140 136 - 145 mmol/L Final     Potassium   Date Value Ref Range Status   04/28/2023 3.7 3.5 - 5.1 mmol/L Final     Chloride   Date Value Ref Range Status   04/28/2023 106 95 - 110 mmol/L Final     CO2   Date Value Ref Range Status   04/28/2023 27 23 - 29 mmol/L Final     Glucose   Date Value Ref Range Status   04/28/2023 113 (H) 70 - 110 mg/dL Final     BUN   Date Value Ref Range Status   04/28/2023 24 (H) 8 - 23 mg/dL Final     Creatinine   Date Value Ref Range Status   04/28/2023 1.0 0.5 - 1.4 mg/dL Final     Calcium   Date Value Ref Range Status   04/28/2023 7.0 (L) 8.7 - 10.5 mg/dL Final     Total Protein   Date Value Ref Range Status   04/28/2023 7.1 6.0 - 8.4 g/dL Final     Albumin   Date Value Ref Range Status   04/28/2023 4.1 3.5 - 5.2 g/dL Final   11/18/2020 3.7 3.6 - 5.1 g/dL Final     Comment:     For additional information, please refer to   http://education.[employer]/faq/UTR713 (This link is   being provided for informational/ educational purposes only.)  This test was developed and its analytical performance   characteristics have been determined by [facility]. It has not been cleared or approved by the   [place] Food and Drug Administration. This assay has been validated   pursuant to the CLIA regulations and is used for clinical   purposes.  @ Test Performed By:  [facility]  Yo Cortes M.D.,   [address] [place] [id number]  CLIA  [id number]       Total Bilirubin   Date Value Ref Range Status   04/28/2023 1.2 (H) 0.1 - 1.0 mg/dL Final     Comment:     For infants and newborns, interpretation of results should be based  on gestational age, weight and in agreement with clinical  observations.    Premature Infant recommended reference ranges:  Up to 24 hours.............<8.0 mg/dL  Up to 48 hours............<12.0 mg/dL  3-5  days..................<15.0 mg/dL  6-29 days.................<15.0 mg/dL       Alkaline Phosphatase   Date Value Ref Range Status   04/28/2023 272 (H) 55 - 135 U/L Final     AST   Date Value Ref Range Status   04/28/2023 36 10 - 40 U/L Final     ALT   Date Value Ref Range Status   04/28/2023 37 10 - 44 U/L Final     Anion Gap   Date Value Ref Range Status   04/28/2023 7 (L) 8 - 16 mmol/L Final     eGFR if    Date Value Ref Range Status   07/29/2022 >60.0 >60 mL/min/1.73 m^2 Final     eGFR if non    Date Value Ref Range Status   07/29/2022 >60.0 >60 mL/min/1.73 m^2 Final     Comment:     Calculation used to obtain the estimated glomerular filtration  rate (eGFR) is the CKD-EPI equation.          Lab Results   Component Value Date    IRON 30 (L) 11/25/2022    TRANSFERRIN 114 (L) 11/25/2022    TIBC 169 (L) 11/25/2022    FESATURATED 18 (L) 11/25/2022            Radiology/Diagnostic Studies:      CTA Neck    Result Date: 9/20/2022  EXAMINATION: CTA NECK CLINICAL HISTORY: Head/neck cancer, monitor;Malignant neoplasm of base of tongue TECHNIQUE: CT angiogram was performed from the level of the scott to the EAC following the IV administration of 75mL of Omnipaque 350.   Sagittal and coronal reconstructions and maximum intensity projection reconstructions were performed. Arterial stenosis percentages are based on NASCET measurement criteria. COMPARISON: CTA neck dated 05/20/2022 FINDINGS: Aortic arch and great vessels: There is a conventional left-sided 3 vessel arch.  The aortic arch is widely patent.  There is stable soft and calcified plaque in the proximal left subclavian artery with a similar appearance the prior study and possibly within radiation field but without critical stenosis or occlusion.  The right subclavian artery is patent.  The brachiocephalic trunk and origin of the left common carotid artery are patent. Carotid arteries Right carotid artery: There is calcified plaque  scattered in the right common carotid artery without critical stenosis, occlusion or change.  There is no measurable stenosis of the internal carotid artery which is patent to the skull base. Left carotid artery: There is mild plaque scattered in the left common carotid artery without change and without critical stenosis or occlusion.  There is no measurable stenosis of the internal carotid artery. Vertebral arteries: The left vertebral artery is dominant and patent throughout its course in the neck.  The right vertebral artery is hypoplastic but patent.  There is no critical stenosis, occlusion, thrombus or dissection.  There is no change. The study extends intracranially.  There is calcified plaque in the V4 segment of the left vertebral artery resulting in a moderate to marked short segment nonocclusive stenosis.  The right vertebral artery partially terminates in a posteroinferior cerebellar artery with a short segment moderate to marked stenosis of the very distal right vertebral artery.  Some flow within the distal right vertebral artery may represent retrograde flow from the dominant left vertebral artery.  The basilar artery is patent.  The origins of the superior cerebellar and posterior cerebral artery on the right are patent.  There is fetal origin of the left posterior cerebral artery which is patent. There is plaque in the cavernous segments of both internal carotid arteries but there is no critical stenosis or large vessel occlusion of the anterior circulation vessels.  There is no large aneurysm. Other: There is a similar appearance of the included upper lungs with pleural and parenchymal changes anteriorly in the upper lobes bilaterally.  As previously discussed, timing of the contrast bolus is performed during the arterial phase for CTA neck.  Therefore, enhancement of the soft tissues is somewhat suboptimal due to this technique. There has been a large region of soft tissue resection in the  anterior mid and lower neck soft tissues with continued open defect in the upper chest and lower neck above the manubrium.  Soft tissue seen along the left posterolateral border of the trachea could represent secretions or mucus.  This was present previously. Redemonstrated is a large heterogeneously enhancing lesion centered at the level of the tongue base and floor of the mouth centrally into the left.  There is scattered calcifications.  The prior CTA demonstrated regions of central necrosis which are no longer definitely present but diffusely heterogeneous density and enhancement likely represents regions of necrosis.  This large heterogeneously enhancing soft tissue mass extends more anteriorly in the floor of the mouth on the left and measures at least 5.6 cm in greatest anterior to posterior dimension with 3.6 cm transverse dimension.  This does extend anteriorly to the medial margin of the body of the mandible on the left. There are post treatment changes with stranding in the neck fat.     1. Similar appearance of the neck vessels when compared to the prior CTA neck with mild narrowing at the origin of the left subclavian artery.  There is no critical stenosis, occlusion, thrombosis or dissection involving the cervical vertebral or carotid arteries. 2. Larger mass within the hypopharynx and tongue base extending anteriorly to the floor of the mouth on the left to the medial margin of the body of the mandible without obvious bony destruction. 3. There is nonocclusive stenosis of the distal V4 segment of the left vertebral artery without change. 4. There is no large vessel occlusion or critical stenosis of the anterior circulation. 5. There is developmental variation with fetal origin of the left posterior cerebral artery. Electronically signed by: Rex Joyner MD Date:    09/20/2022 Time:    11:31    CT Abdomen Pelvis With Contrast    Result Date: 9/1/2022  CMS MANDATED QUALITY DATA - CT RADIATION - 436  All CT scans at this facility utilize dose modulation, iterative reconstruction, and/or weight based dosing when appropriate to reduce radiation dose to as low as reasonably achievable. Reason: abdominal pain partial history of laryngeal carcinoma TECHNIQUE: CT abdomen and pelvis with 100 mL Omnipaque 350. COMPARISON: PET/CT 5/13/2022 CT ABDOMEN: Coronary artery calcification and extremely tiny hiatal hernia incidentally noted. Visualized lung bases are clear. Liver, gallbladder, pancreas, spleen, adrenals, and kidneys are normal. Mild aortoiliac calcifications present. Gastrostomy tube tip lies in distal gastric body. Small intestines are unremarkable. Mild wall thickening affects the ascending colon with pneumatosis intestinalis diffusely affecting the ascending colon. No other free intraperitoneal gas or, and remainder of colon is normal. A normal appendix is present. The major mesenteric vascular structures are patent. No acute osseous abnormality. CT PELVIS: Prostate is slightly enlarged. Bladder is normal. No free pelvic fluid. Tiny right and small to moderate left fat-containing inguinal hernias are present. No acute osseous abnormality. IMPRESSION: 1. Pneumatosis intestinalis affecting the ascending colon with associated mild wall thickening. Etiology of this is undetermined. Potential considerations include intestinal ischemia or pneumatosis related to chemotherapy. Clinical and laboratory correlation is needed to assess significance. No portal venous gas or free intraperitoneal air however. 2. Coronary artery calcifications Electronically signed by:  Nael Hui MD  9/1/2022 6:20 PM CDT Workstation: [id number]    NM PET CT Routine Skull to Mid Thigh    Result Date: 9/27/2022  PET CT WITH IMAGE FUSION HISTORY:  restage tongue cancer RESTAGING...  HX: TONGUE CA.  DX: April 2022.  LAST CHEMO TREATMENT WAS 3WKS AGO.  EVALUATE TX RESPONSE.   ALSO HX OF LARYNGEAL CA IN AUGUST 2020.  MULTIPLE SURGERIES:  LARYNGECTOMY, THYROIDECTOMY & TRACHEOSTOMY.  RAD TX WAS COMPLETED IN NOV 2020. TECHNIQUE: Following IV administration of 11.2 mCi of F-18 labeled FDG into right antecubital fossa and a 60 minute delay, PET CT was performed from the vertex of the skull through the proximal thighs with an integrated PET CT scanner with image fusion. CT images were obtained to aid in attenuation correction and PET localization. The patient's serum glucose at the time of the exam was 136 mg/dL. COMPARISON: PET/CT 5/13/2022 FINDINGS: Poorly characterized soft tissue mass involving base of tongue approximately measures 4.3 x 2.8 cm (series 3 image 65) appearing similar to the prior exam. This reaches current max SUV of 11.8, not significantly changed from prior of 11.4. No other abnormal FDG activity. Intracranial compartment is unremarkable. Trace bilateral maxillary sinus mucosal thickening is present. Postoperative changes of laryngectomy and tracheostomy are present. Surgical clips occur throughout the neck. No definite enlarged cervical or supraclavicular lymph node, with evaluation limited by lack of IV contrast. Left subclavian port catheter tip terminates in SVC. Diffuse coronary artery calcifications are present. No enlarged mediastinal or axillary lymph nodes. No pulmonary nodule or mass. Gastrostomy tube tip lies in gastric body. Moderate aortoiliac calcifications are present. Small fat-containing left inguinal hernia incidentally noted. Mild degenerative changes affect the spine. No suspicious osseous abnormality. IMPRESSION: 1. No significant change in the FDG avid mass involving the tongue base, remaining characteristic of malignancy. 2. No new FDG avid malignancy or metastatic disease. Electronically signed by:  Nael Hui MD  9/27/2022 2:38 PM CDT Workstation: [id number]    CT Abdomen Pelvis  Without Contrast    Result Date: 9/4/2022  CMS MANDATED QUALITY DATA - CT RADIATION  436 All CT scans at this facility utilize  dose modulation, iterative reconstruction, and/or weight based dosing when appropriate to reduce radiation dose to as low as reasonably achievable. CT ABDOMEN PELVIS WITHOUT IV CONTRAST CLINICAL HISTORY: 72 years Male Follow-up pneumatosis COMPARISON: CT abdomen and pelvis September 1, 2022 FINDINGS: Imaging through the lower thorax demonstrates subsegmental atelectasis of the dependent lower lobes. Coronary artery calcification. Bone window images show no acute or aggressive osseous abnormality. Transitional lumbosacral vertebral body. On this unenhanced exam, no focal hepatic lesion. Gallbladder and biliary tree are unremarkable. Spleen appears normal. Pancreas is unremarkable. No adrenal lesion. No renal calculi or hydronephrosis. Ureters are normal in caliber. Urinary bladder is within normal limits. Gastrostomy tube is in place within the stomach. Stomach is largely collapsed. No evidence of small bowel obstruction. Pneumatosis and pericolonic abscess involving the ascending colon is redemonstrated, slightly diminished compared to prior. Mild wall thickening throughout the colon. No free fluid or free air within the abdomen or pelvis. Aortoiliac atherosclerotic calcification. No pathologically enlarged lymph nodes within the abdomen or pelvis. Bilateral fat-containing inguinal hernias, larger on the left. IMPRESSION: Pneumatosis and pericolonic gas about the ascending colon has decreased in volume compared to prior. Persistent colonic wall thickening which could indicate colitis. Coronary and aortic atherosclerosis. Gastrostomy tube is within the stomach. Bilateral inguinal hernias. Electronically signed by:  Jose De Jesus Oleary MD  9/4/2022 4:06 PM CDT Workstation: [id number]    CTA neck  5/20/2022:     IMPRESSION:  Persistent mass within the hypopharynx consistent with malignancy.  By my measurements it is larger than on April 24, 2022 with central necrosis.  Stenosis to the intracranial portion of the left  vertebral artery with possible short segment occlusion. This is similar to the previous April 2022 study.  No evidence of arterial compromise related to malignancy.     PET 5/13/2022:     IMPRESSION:  1. Postoperative changes of prior laryngectomy and lymph node resection/radiation.  2. Masslike FDG avid lesion to the left of midline at the tongue base concerning for residual/recurrent disease.  3. Adjacent confluent nodular extension along the inferior left side of the mass.  4. No FDG avid lymphadenopathy or convincing additional sites of disease in the chest, abdomen or pelvis.     CT head 5/10/2022:  FINDINGS: Comparison to multiple prior exams. There is no acute intracranial hemorrhage, with no mass effect or abnormal extra-axial fluid. Mild scattered areas of nonspecific hypoattenuation involve the deep periventricular white matter, with gray-white differentiation maintained.     There is mild generalized prominence of the cortical sulci and ventricles. The cerebellum and brainstem are unremarkable. There are carotid siphon vascular calcifications. The visualized paranasal sinuses and mastoid air cells are clear. There is no acute calvarial fracture or scalp hematoma.     IMPRESSION: No acute intracranial hemorrhage or acute calvarial fracture     CT soft neck 4/24/2022;     Oral tongue/tongue base:  At the base of the tongue on the left there are findings of a heterogeneously enhancing mass measuring 2.1 cm highly concerning for a neoplastic process.     True and false cords:  Patient status post laryngectomy which has been performed in the interim. Soft tissue stranding in the lower neck likely related to a previous flat reconstruction. No enhancement or lymphadenopathy within the lower neck.     Lymph node assessment:Negative     Surrounding soft tissues:  Negative     Vasculature:  Negative     Osseous structures:  Negative     Lung apices:  Scarring involving the lung apices bilaterally likely related to  previous radiation.     IMPRESSION:  1. Postoperative and radiation changes involving the lower neck.  2. Findings highly concerning for a developing mass at the left base of the tongue enhancing 2.1 cm region. Direct visualization in this region would be of benefit.        CT soft neck  12/24/2021  IMPRESSION:  1.  Laryngeal mass as on prior exam. Laryngeal airway narrowing has not changed.  2.  No evidence for active hemorrhage.  3.  Partially visualized patchy groundglass infiltrates in both upper lobes. Also see CT chest report     CTA Chest 12/24/2021:  IMPRESSION:     1.  No pulmonary embolism.     2.  Soft tissue stranding in the region of the strap musculature with ill-defined hypodensity measuring 2.8 x 1.4 cm in the cervical midline.  Findings concerning for infectious process or abscess. Neoplastic process could have similar imaging characteristics.     3.  Suggested erosion of the proximal right parasymphyseal aspect of the thyroid shield.  Correlated with CT soft tissue neck findings. Findings could represent osteomyelitis. Neoplastic process cannot be excluded.     4.  Hepatic steatosis.  5.  Thickening of the gastric wall. Prominence of perigastric vasculature. Small hiatal hernia.     6.  Moderate stool burden.  Correlate for constipation.        PET 12/15/2021:  IMPRESSION:     Substantial qualitative and quantitative increase of hypermetabolic FDG activity associated with the larynx in this patient with known supraglottic neoplasm.     No evidence of FDG avid metastatic disease involving neck, chest, abdomen or pelvis.     PET 8/21/2020:     Impression:     1. Intensely hypermetabolic plaque-like mass along the left true vocal cord, compatible with known laryngeal carcinoma.  2. No findings of regional metastatic disease in the neck, or distant metastatic disease.        CT Chest 8/10/2020:     IMPRESSION:     No metastatic disease within the chest.  Three-vessel coronary artery calcification.  Nondilated cardiac  chambers     CT Soft neck 7/22/2020:  IMPRESSION:  1. Slight interval increase in size of the previously described  enhancing nodule along the anterior left vocal cord.  2. No pathologic lymphadenopathy.  3. Additional and incidental findings as noted above.     CT Soft neck  3/16/2020:     Impression:     6 mm enhancing focus involving the superior aspect of the left focal cord near the anterior commisure.  Recommend direct visualization for further evaluation of laryngeal polyp     Question of 2 mm submucosal lipoma involving the midportion of the superior aspect of the left vocal cord.     Mild arteriosclerosis involving the brachiocephalic arteries and carotid arteries     Mild degenerative change of the cervical spine        I have reviewed all available lab results and radiology reports.    Assessment/Plan:   (1) 72 y.o. 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     - Patient has been under the care of Dr Khoobehi with [facility] and Dr Portillo with rad/onc.   - He was recently found to have a new primary cancer involving the base of his tongue in April 2022. He was deemed not to be a candidate for any further radiation and did not want to undergo any further surgery as this would necessitate a glossectomy procedure.   - He has been on adjuvant chemotherapy per direction of Dr Khoobehi and has had 3 cycles. His therapy course has been complicated with persistent diarrhea and N/V.      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 He saw Dr Lucero with Rad/onc this am. They are going [place] [facility] [place] Dr James about surgical options. If he is not a surgical candidate then will proceed with cisplatin and XRT combine therapy.     10/6/2022:  - He saw Dr Lucero with Rad/onc, and Dr James and his case was [place] [facility] [facility] [place]  he general consensus was to proceed to surgery.  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   - He had his portacath removed on 11/28.   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, keratinizing type, moderate to poorly differentiated tumor  - Four LN's were all negative  - he did have a positive left superior pharyngeal margin  - pT3 pN0  - reviewed the latest NCCN guidelines again from Version 1.2023; he may need some further systemic therapy due to the margin  - check on Rad/onc follow-up     1/23/2023:  - s/p new portacath placed on 1/12/2023 with Dr Le  - starting combined chemotherapy and XRT - cisplatin  - s/p chemotherapy school with [facility]    2/6/2023:  - he seems to be tolerating the chemotherapy well at this time  - continued with concomitant therapy with XRT    2/22/2023:  - he seems to be tolerating the chemotherapy well at this time  - continued with concomitant therapy with XRT  - labs relatively adequate  - will check on his refills    3/6/2023:  - finishing up XRT this comking Thursday  - last  chemotherapy today    4/3/2023:  - He has since completed chemotherapy and  XRT.  He seems to have tolerated the regimen fairly well with no new complaints.  Some weight loss but reports he is staying hydrated. He does have some occasional GRIFFIN.   - He is seeing ENT tomorrow with Dr James    5/1/2023:   - saw Dr. James and he reported Aleksandr   -PET due end of May   - issues with calcium, will increase dosing   - speak to endocrine about managing long term          (2) Hx/of Laryngeal cancer in Aug 2020 stage II (cT2 N0)  - s/p radiation followed by bilateral neck dissection and total thyroidectomy     Brief Oncology Summary for his laryngeal CA hx:     Patient was noted to initially have a suspicious lesion on the left true vocal cord by laryngoscopy with Dr Xiao in March 2020 with biopsy showing severe dysplastic changes without definitive invasive process. He had a CT scan on 3/16/2020 which showed a 6mm nodule on the left vocal cord. A repeat Ct scan four months later on 7/22/2020 showed the nodule enlarged to 1.4 x 1.1 cm in size. Patient was then seen by Dr Noel and underwent repeat scope in Aug 2020 who found a bulky deeply invading tumor which was debulked and the pathology coming back moderately differentiated SCCA without lymphovascular or perineural invasion. Hs case was subsequently presented to the tumor board and the consensus was to proceed with radiation. He completed XRT on 10/30/2020 and proceeded with regular laryngoscopy surveillance. He eventually required salvage laryngectomy and neck dissection with flap with Dr James on Jan 6th 2022. 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 Fifty lymph nodes were removed which were all negative. Pathology TNM was pT4a, pN0. Patient did not require any adjuvant therapy afterwards.      (2) HTN and hypercholesterolemia     (3)  Paroxysmal atrial fibrillation, V tach     (4) DM II     (5) B12 deficiency      (6) Fatty liver disease, GERD           VISIT DIAGNOSES:      Malignant neoplasm of base of tongue    Hypocalcemia  -     calcium carbonate 500 mg/5 mL (1,250 mg/5 mL); 10 mLs (1,000 mg total) by Per G Tube route 4 (four) times daily with meals and nightly.  Dispense: 473 mL; Refill: 12            PLAN:  Proceed with ENT evaluation tomorrow; expect he will need repeat scans in about 4 weeks post-XRT   s/p chemotherapy school with Briana - discussed the side-effect profile, provided literature and obtained consents  F/u Rad/onc follow-up as directed  F/u with Dr James with ENT and Dr Lucero with rad/onc  Check labs every 2 weeks for now   F/u with PCP, ENT, etc  Increase liquid calcium to 15ml 4 times and day and speak to endocrine about long term management      RTC in 4 week with DR. Tamez and 8 weeks with myself        Discussion:     COVID-19 Discussion:     I had long discussion with patient and any applicable family about the COVID-19 coronavirus epidemic and the recommended precautions with regard to cancer and/or hematology patients. I have re-iterated the CDC recommendations for adequate hand washing, use of hand -like products, and coughing into elbow, etc. In addition, especially for our patients who are on chemotherapy and/or our otherwise immunocompromised patients, I have recommended avoidance of crowds, including movie theaters, restaurants, churches, etc. I have recommended avoidance of any sick or symptomatic family members and/or friends. I have also recommended avoidance of any raw and unwashed food products, and general avoidance of food items that have not been prepared by themselves. The patient has been asked to call us immediately with any symptom developments, issues, questions or other general concerns.         Pathology Discussion:     I reviewed and discussed the pathology report(s) and  "radiograph reports (if available) in as simple to understand and/or laymen's terms to the best of my ability. I had an indepth conversation with the patient and went over the patient's individual diagnosis based on the information that was currently available. I discussed the TNM staging process with regard to the patient's particular cancer type, and the calculated stage based on the currently available TNM data and literature. I discussed the available prognostic data with regard to the current staging information and how it relates to the prognosis of their particular neoplastic process.          NCCN Guidelines:     I discussed the available treatment option(s) in accordance with the latest literature from the NCCN Clinical Practice Guidelines for the patient's particular type of cancer disorder. The NCCN Guidelines provide a "document evidence-based (and) consensus-driven management" of the care of oncology patients. The treatment recommendations were made not only in accordance to the NCCN guidelines, but also factored in to account the patient's overall age, condition, performance status and their medical co-morbidities. I went over the risks and benefits of the the treatment options (if any could be made) with regard to their particular cancer type, their cancer stage, their age, and their co-morbidities.         Chemotherapy Discussion:      I discussed the available treatment option(s) in accordance with the latest/current national evidence-based guidelines (NCCN, UpToDate, NCI, ASCO, etc where applicable), their overall age/condition and their co-morbidities. I also went over the risks and benefits of the chemotherapy with regard to their particular cancer type, their cancer stage, their age/condition, and their co-morbidities. I provided literature on the chemotherapy regimen and discussed the chemotherapy side-effect profiles of the drug(s). I discussed the importance of compliance with obtaining and " monitoring weekly lab work, and went over the potential hematopathology issues and risks with anemia, leucopenia and thrombocytopenia that can occur with chemotherapy. I discussed the potential risks of liver and kidney damage, which could be permanent and could necessitate dialysis long-term if kidney failure developed. I discussed the emetic and/or diarrheal potential of the regimen and the potential need for use of antiemetic and anti-diarrheal medications. I discussed the risk for development of anaphylactic shock, bronchospasm, dysrhythmia, and respiratory/cardiovascular arrest and/or failure. I discussed the potential risks for development of alopecia, cold sensory issues, ringing in ears, vertigo, cataracts, glaucoma, and neuropathy, all of which could end up being chronic and life-long. Some chemotherpyI discussed the risks of hand-foot syndrome and rashes, and development of other autoimmune mediated processes such as pneumonitis, hepatitis, and colitis which could be life threatening. I discussed the risks of the potential development of a rare but fatal viral mediated disease known as PML (Progressive Multifocal Leukoencephalopathy), and risk of future development of leukemia and/or lymphoma from use of certain chemotherapy agents. I discussed the need for neutropenic precautions, basic hygiene/sanitation behaviors and dietary restrictions.    The patient's consent has been obtained to proceed with the chemotherapy.The patient will be referred to Chemotherapy School /[facility] for training and education on chemotherapy, use of antiemetics and/or anti-diarrheals, use of NSAID's, potential chemotherapy side-effects, and any specific recommendations and precautions with the particular chemotherapy agents.      I answered all of the patient's (and family's, if applicable) questions to the best of my ability and to their complete satisfaction. The patient acknowledged full understanding of the risks,  recommendations and plan(s).     I have explained and the patient understands all of  the current recommendation(s). I have answered all of their questions to the best of my ability and to their complete satisfaction.         I spent over 25 mins of time with the patient. Reviewed results of the recently ordered labs, tests and studies; made directives with regards to the results. Over half of this time was spent couseling and coordinating care.    I have explained all of the above in detail and the patient understands all of the current recommendation(s). I have answered all of their questions to the best of my ability and to their complete satisfaction.   The patient is to continue with the current management plan.            Electronically signed by Briana Martinez, MSN,APRN,AGNP-C

## 2023-05-02 ENCOUNTER — OFFICE VISIT (OUTPATIENT)
Dept: SURGICAL ONCOLOGY | Facility: CLINIC | Age: 73
End: 2023-05-02
Payer: MEDICARE

## 2023-05-02 VITALS — WEIGHT: 129.88 LBS | HEIGHT: 66 IN | BODY MASS INDEX: 20.87 KG/M2

## 2023-05-02 DIAGNOSIS — C32.9 LARYNX CANCER: ICD-10-CM

## 2023-05-02 DIAGNOSIS — E11.65 TYPE 2 DIABETES MELLITUS WITH HYPERGLYCEMIA, WITH LONG-TERM CURRENT USE OF INSULIN: Primary | ICD-10-CM

## 2023-05-02 DIAGNOSIS — Z79.4 TYPE 2 DIABETES MELLITUS WITH HYPERGLYCEMIA, WITH LONG-TERM CURRENT USE OF INSULIN: Primary | ICD-10-CM

## 2023-05-02 DIAGNOSIS — E55.9 HYPOVITAMINOSIS D: ICD-10-CM

## 2023-05-02 DIAGNOSIS — C01 MALIGNANT NEOPLASM OF BASE OF TONGUE: Primary | ICD-10-CM

## 2023-05-02 DIAGNOSIS — E20.9 HYPOPARATHYROIDISM, UNSPECIFIED HYPOPARATHYROIDISM TYPE: ICD-10-CM

## 2023-05-02 PROCEDURE — 1101F PR PT FALLS ASSESS DOC 0-1 FALLS W/OUT INJ PAST YR: ICD-10-PCS | Mod: CPTII,S$GLB,, | Performed by: OTOLARYNGOLOGY

## 2023-05-02 PROCEDURE — 1126F AMNT PAIN NOTED NONE PRSNT: CPT | Mod: CPTII,S$GLB,, | Performed by: OTOLARYNGOLOGY

## 2023-05-02 PROCEDURE — 1101F PT FALLS ASSESS-DOCD LE1/YR: CPT | Mod: CPTII,S$GLB,, | Performed by: OTOLARYNGOLOGY

## 2023-05-02 PROCEDURE — 3008F BODY MASS INDEX DOCD: CPT | Mod: CPTII,S$GLB,, | Performed by: OTOLARYNGOLOGY

## 2023-05-02 PROCEDURE — 99213 PR OFFICE/OUTPT VISIT, EST, LEVL III, 20-29 MIN: ICD-10-PCS | Mod: 25,S$GLB,, | Performed by: OTOLARYNGOLOGY

## 2023-05-02 PROCEDURE — 3008F PR BODY MASS INDEX (BMI) DOCUMENTED: ICD-10-PCS | Mod: CPTII,S$GLB,, | Performed by: OTOLARYNGOLOGY

## 2023-05-02 PROCEDURE — 1160F RVW MEDS BY RX/DR IN RCRD: CPT | Mod: CPTII,S$GLB,, | Performed by: OTOLARYNGOLOGY

## 2023-05-02 PROCEDURE — 99999 PR PBB SHADOW E&M-EST. PATIENT-LVL III: CPT | Mod: PBBFAC,,, | Performed by: OTOLARYNGOLOGY

## 2023-05-02 PROCEDURE — 31575 PR LARYNGOSCOPY, FLEXIBLE; DIAGNOSTIC: ICD-10-PCS | Mod: S$GLB,,, | Performed by: OTOLARYNGOLOGY

## 2023-05-02 PROCEDURE — 3288F FALL RISK ASSESSMENT DOCD: CPT | Mod: CPTII,S$GLB,, | Performed by: OTOLARYNGOLOGY

## 2023-05-02 PROCEDURE — 1159F PR MEDICATION LIST DOCUMENTED IN MEDICAL RECORD: ICD-10-PCS | Mod: CPTII,S$GLB,, | Performed by: OTOLARYNGOLOGY

## 2023-05-02 PROCEDURE — 1126F PR PAIN SEVERITY QUANTIFIED, NO PAIN PRESENT: ICD-10-PCS | Mod: CPTII,S$GLB,, | Performed by: OTOLARYNGOLOGY

## 2023-05-02 PROCEDURE — 1160F PR REVIEW ALL MEDS BY PRESCRIBER/CLIN PHARMACIST DOCUMENTED: ICD-10-PCS | Mod: CPTII,S$GLB,, | Performed by: OTOLARYNGOLOGY

## 2023-05-02 PROCEDURE — 99213 OFFICE O/P EST LOW 20 MIN: CPT | Mod: 25,S$GLB,, | Performed by: OTOLARYNGOLOGY

## 2023-05-02 PROCEDURE — 99999 PR PBB SHADOW E&M-EST. PATIENT-LVL III: ICD-10-PCS | Mod: PBBFAC,,, | Performed by: OTOLARYNGOLOGY

## 2023-05-02 PROCEDURE — 3288F PR FALLS RISK ASSESSMENT DOCUMENTED: ICD-10-PCS | Mod: CPTII,S$GLB,, | Performed by: OTOLARYNGOLOGY

## 2023-05-02 PROCEDURE — 31575 DIAGNOSTIC LARYNGOSCOPY: CPT | Mod: S$GLB,,, | Performed by: OTOLARYNGOLOGY

## 2023-05-02 PROCEDURE — 1159F MED LIST DOCD IN RCRD: CPT | Mod: CPTII,S$GLB,, | Performed by: OTOLARYNGOLOGY

## 2023-05-02 NOTE — PROGRESS NOTES
Chief Complaint   Patient presents with    Follow-up     Follow up Larynx cancer. States feels like gets a choking sensation at times.      Oncology History   Larynx cancer   8/4/2020 Initial Diagnosis    Larynx neoplasm malignant     8/6/2020 Tumor Conference    His case was discussed at the Multidisciplinary Head and Neck Team Planning Meeting.    Representatives from Medical Oncology, Radiation Oncology, Head and Neck Surgical Oncology, Psychosocial Oncology, and Speech and Language Pathology discussed the case with the following recommendations:    1) definitive radiation              8/6/2020 Cancer Staged    Staging form: Larynx - Glottis, AJCC 8th Edition  - Clinical stage from 8/6/2020: Stage II (cT2, cN0, cM0)       8/6/2020 Cancer Staged    Cancer Staging  Larynx neoplasm malignant  Staging form: Larynx - Glottis, AJCC 8th Edition  - Clinical stage from 8/6/2020: Stage II (cT2, cN0, cM0) - Signed by Fariha Muñoz NP on 8/6/2020 12/16/2021 Tumor Conference    His case was discussed at the Multidisciplinary Head and Neck Team Planning Meeting.    Representatives from Medical Oncology, Radiation Oncology, Head and Neck Surgical Oncology, Psychosocial Oncology, and Speech and Language Pathology discussed the case with the following recommendations:    1) TL  2) oncology referral  3) psychology referral            12/27/2021 Surgery    1. DL And tracheostomy  2. PEG     1/6/2022 Surgery    1. Diagnostic direct laryngoscopy  2. Bilateral modified neck dissection of levels 2 through 4  3. Total laryngectomy  4. Total thyroidectomy with bilateral paratracheal/upper mediastinal neck dissection  5. Autotransplantation of left inferior parathyroid into left sternocleidomastoid muscle   6. Left anterolateral thigh free flap for closure of total laryngectomy neck skin defect  7. Advancement flap for closure of left thigh donor site advanced area was 25 x 10 cm     1/20/2022 Cancer Staged    Staging form:  Larynx - Glottis, AJCC 8th Edition  - Pathologic stage from 1/20/2022: Stage LOU (pT4a, pN0, cM0)       5/30/2022 Cancer Staged    Staging form: Pharynx - P16 Negative Oropharynx, AJCC 8th Edition  - Clinical: Stage II (rcT2, cN0, cM0)       1/23/2023 -  Chemotherapy    Treatment Summary   Plan Name: OP HEAD NECK CISPLATIN WEEKLY + RADIOTHERAPY  Treatment Goal: Curative  Status: Active  Start Date: 1/23/2023  End Date: 3/6/2023  Provider: Venu Tamez MD  Chemotherapy: CISplatin (Platinol) 40 mg/m2 = 66 mg in sodium chloride 0.9% 631 mL chemo infusion, 40 mg/m2 = 66 mg, Intravenous, Clinic/HOD 1 time, 7 of 7 cycles  Administration: 66 mg (1/23/2023), 66 mg (2/13/2023), 66 mg (2/6/2023), 66 mg (2/20/2023), 66 mg (2/27/2023), 66 mg (3/6/2023)       Malignant neoplasm of base of tongue   5/20/2022 Cancer Staged    Staging form: Pharynx - P16 Negative Oropharynx, AJCC 8th Edition  - Clinical stage from 5/20/2022: Stage II (cT2, cN0, cM0, p16-)       6/22/2022 Initial Diagnosis    Primary cancer of base of tongue       7/11/2022 - 8/26/2022 Chemotherapy    Treatment Summary   Plan Name: OP HEAD NECK PEMBROLIZUMAB CARBOPLATIN FLUOROURACIL Q3W FOLLOWED BY MAINTENANCE PEMBROLIZUMAB 400 MG Q6W  Treatment Goal: Palliative  Status: Inactive  Start Date: 7/11/2022  End Date: 8/26/2022  Provider: Aurash Khoobehi, MD  Chemotherapy: CARBOplatin (PARAPLATIN) 440 mg in sodium chloride 0.9% 544 mL chemo infusion, 440 mg (100 % of original dose 438.5 mg), Intravenous, Clinic/HOD 1 time, 3 of 6 cycles  Dose modification:   (original dose 438.5 mg, Cycle 1)  Administration: 440 mg (7/11/2022), 435 mg (8/1/2022), 510 mg (8/22/2022)       1/23/2023 -  Chemotherapy    Treatment Summary   Plan Name: OP HEAD NECK CISPLATIN WEEKLY + RADIOTHERAPY  Treatment Goal: Curative  Status: Active  Start Date: 1/23/2023  End Date: 3/6/2023  Provider: Venu Tamez MD  Chemotherapy: CISplatin (Platinol) 40 mg/m2 = 66 mg in sodium chloride  0.9% 631 mL chemo infusion, 40 mg/m2 = 66 mg, Intravenous, Clinic/HOD 1 time, 7 of 7 cycles  Administration: 66 mg (1/23/2023), 66 mg (2/13/2023), 66 mg (2/6/2023), 66 mg (2/20/2023), 66 mg (2/27/2023), 66 mg (3/6/2023)             HPI   72 y.o. male presents with the above treatment history.  He is now 5 mos status post total glossectomy and ALT free flap reconstruction. No significant complaints.  He completed CRT on 3/9/23.    Review of Systems   Constitutional: Negative for fatigue and unexpected weight change.   HENT: Per HPI.  Eyes: Negative for visual disturbance.   Respiratory: Negative for shortness of breath, hemoptysis   Cardiovascular: Negative for chest pain and palpitations.   Musculoskeletal: Negative for decreased ROM, back pain.   Skin: Negative for rash, sunburn, itching.   Neurological: Negative for dizziness and seizures.   Hematological: Negative for adenopathy. Does not bruise/bleed easily.   Endocrine: Negative for rapid weight loss/weight gain, heat/cold intolerance.     Past Medical History   Patient Active Problem List   Diagnosis    Melanocytic nevus    Body mass index (BMI) of 29.0-29.9 in adult    Benign hypertension    Overweight    Paroxysmal atrial fibrillation    Type 2 diabetes mellitus with diabetic arthropathy, with long-term current use of insulin    Fatty liver disease, nonalcoholic    False positive serological test for hepatitis C    Hyperlipidemia    Type 2 diabetes mellitus with hyperglycemia, without long-term current use of insulin    Gastroesophageal reflux disease without esophagitis    Type 2 diabetes mellitus with hyperglycemia    Vitamin B12 deficiency    Hyperuricemia    Hypovitaminosis D    Proteinuria    On enteral nutrition    Larynx cancer    Dehydration    NSVT (nonsustained ventricular tachycardia)    Adverse effect of adrenal cortical steroids, sequela    S/P laryngectomy    Hypocalcemia    Aphonia    Dysphagia, pharyngoesophageal    Acute pain of both  shoulders    Bilateral arm weakness    Oral bleeding    Malignant neoplasm of base of tongue    Advanced care planning/counseling discussion    Iron deficiency anemia due to chronic blood loss    Postoperative hypothyroidism    Pressure injury of sacral region, stage 1    Pneumatosis intestinalis    Nausea and vomiting    Neutropenia    Abnormal CT scan, neck    Open wound of neck    Open wnd of pharynx    Open wound of left thigh    Open wound of mouth    Tracheostomy in place    Malnutrition of mild degree    Type 2 diabetes mellitus, without long-term current use of insulin    Laryngeal cancer    Hypocalcemia    Hyperbilirubinemia    Elevated transaminase level    Hyponatremia    PEG (percutaneous endoscopic gastrostomy) adjustment/replacement/removal    Hypothyroidism    Dehydration    Pharyngocutaneous fistula    Protein malnutrition    Elevated alkaline phosphatase level           Past Surgical History   Past Surgical History:   Procedure Laterality Date    DIRECT LARYNGOBRONCHOSCOPY N/A 12/27/2021    Procedure: LARYNGOSCOPY, DIRECT, WITH BRONCHOSCOPY;  Surgeon: Flex Espinosa MD;  Location: [address];  Service: ENT;  Laterality: N/A;    DIRECT LARYNGOSCOPY  11/9/2022    Procedure: LARYNGOSCOPY, DIRECT;  Surgeon: Jesse James MD;  Location: [address];  Service: ENT;;    DISSECTION OF NECK Bilateral 1/6/2022    Procedure: DISSECTION, NECK;  Surgeon: Jesse James MD;  Location: [address];  Service: ENT;  Laterality: Bilateral;    DISSECTION OF NECK Bilateral 11/9/2022    Procedure: DISSECTION, NECK;  Surgeon: Jesse James MD;  Location: [address];  Service: ENT;  Laterality: Bilateral;    FLAP PROCEDURE Right 1/6/2022    Procedure: CREATION, FREE FLAP;  Surgeon: Alise Hart MD;  Location: [address];  Service: ENT;  Laterality: Right;  Ischemic start 1351  Ischemic stop 1502    FLAP PROCEDURE Left 11/9/2022    Procedure: CREATION, FREE FLAP, ALT;  Surgeon:  Alise Hart MD;  Location: [facility] OR 2ND FLR;  Service: ENT;  Laterality: Left;    GLOSSECTOMY Bilateral 11/9/2022    Procedure: TOTAL GLOSSECTOMY;  Surgeon: Jesse James MD;  Location: [facility] OR 2ND FLR;  Service: ENT;  Laterality: Bilateral;    INSERTION OF TUNNELED CENTRAL VENOUS CATHETER (CVC) WITH SUBCUTANEOUS PORT N/A 6/9/2022    Procedure: [id number];  Surgeon: Jesus Viera MD;  Location: [facility] OR;  Service: General;  Laterality: N/A;    INSERTION OF TUNNELED CENTRAL VENOUS CATHETER (CVC) WITH SUBCUTANEOUS PORT Right 1/12/2023    Procedure: [id number];  Surgeon: Abdulaziz Le Jr., MD;  Location: [facility] OR;  Service: General;  Laterality: Right;    LARYNGECTOMY N/A 1/6/2022    Procedure: LARYNGECTOMY;  Surgeon: Jesse James MD;  Location: [facility] OR 2ND FLR;  Service: ENT;  Laterality: N/A;    LARYNGOSCOPY N/A 8/4/2020    Procedure: Suspension microlaryngoscopy with biopsy, possible KTP laser treatment/excision;  Surgeon: Stew Noel MD;  [facility];  Service: ENT;  Laterality: N/A;  Microscope, telescopes, tower, microinstruments, KTP laser, rep conf# [id number] IC 7/28.    LARYNGOSCOPY N/A 3/16/2021    Procedure: Suspension microlaryngoscopy with excision of lesion, possible CO2 laser;  Surgeon: Stew Noel MD;  Location: [facility] OR 2ND FLR;  Service: ENT;  Laterality: N/A;  Microscope, telescopes, tower, microinstruments, CO2 laser, rep conf# [id number] IC 3/4.    LARYNGOSCOPY N/A 4/1/2021    Procedure: Suspension microlaryngoscopy with KTP laser excision of lesion;  Surgeon: Stew Noel MD;  Location: [facility] OR 2ND FLR;  Service: ENT;  Laterality: N/A;  Microscope, telescopes, tower, microinstruments, 70 degree scope, vocal fold , KTP laser, rep conf# [id number] BC    LARYNGOSCOPY N/A 12/9/2021    Procedure: Suspension microlaryngoscopy with biopsy;  Surgeon: Stew Noel MD;  Location: [facility] OR [address] [place] [address];  Service: ENT;   Laterality: N/A;  Microscope, telescopes, tower, microinstruments    LARYNGOSCOPY N/A 1/6/2022    Procedure: LARYNGOSCOPY;  Surgeon: Jesse James MD;  Location: [facility] OR 2ND FLR;  Service: ENT;  Laterality: N/A;    LARYNGOSCOPY N/A 4/27/2022    Procedure: LARYNGOSCOPY WITH BIOPSY;  Surgeon: Jesse James MD;  Location: [facility] OR [facility] FLR;  Service: ENT;  Laterality: N/A;    MICROLARYNGOSCOPY N/A 3/17/2020    Procedure: MICROLARYNGOSCOPY;  Surgeon: Jung Xiao MD;  Location: [facility] OR;  Service: ENT;  Laterality: N/A;  Laser Microlaryngoscopy  [facility] [id number]    PHARYNGECTOMY  11/9/2022    Procedure: TOTAL PHARYNGECTOMY;  Surgeon: Jesse James MD;  [facility];  Service: ENT;;    REIMPLANTATION OF PARATHYROID TISSUE N/A 1/6/2022    Procedure: REIMPLANTATION, PARATHYROID TISSUE;  Surgeon: Jesse James MD;  Location: [facility] OR 2ND FLR;  Service: ENT;  Laterality: N/A;    SKIN SPLIT GRAFT Right 11/9/2022    Procedure: APPLICATION, GRAFT, SKIN, SPLIT-THICKNESS;  Surgeon: Jesse James MD;  [facility];  Service: ENT;  Laterality: Right;    THYROIDECTOMY  1/6/2022    Procedure: THYROIDECTOMY;  Surgeon: Jesse James MD;  [facility];  Service: ENT;;    TRACHEOSTOMY N/A 12/27/2021    Procedure: CREATION, TRACHEOSTOMY;  Surgeon: Flex Espinosa MD;  Location: [facility] OR [facility] FLR;  Service: ENT;  Laterality: N/A;         Family History   Family History   Problem Relation Age of Onset    Abnormal EKG Mother     Diabetes Father     Heart disease Father     Hypertension Father            Social History   .  Social History     Socioeconomic History    Marital status:      Spouse name: Janel Batres    Number of children: 2   Occupational History    Occupation: [employer]     Employer: [employer]   Tobacco Use    Smoking status: Never    Smokeless tobacco: Never   Substance and Sexual Activity    Alcohol  use: Not Currently     Comment: occasional    Drug use: No    Sexual activity: Yes     Partners: Female   Social History Narrative    ** Merged History Encounter **         2 children from his 1st wife     Social Determinants of Health     Financial Resource Strain: Low Risk     Difficulty of Paying Living Expenses: Not hard at all   Food Insecurity: No Food Insecurity    Worried About Running Out of Food in the Last Year: Never true    Ran Out of Food in the Last Year: Never true   Transportation Needs: No Transportation Needs    Lack of Transportation (Medical): No    Lack of Transportation (Non-Medical): No   Physical Activity: Insufficiently Active    Days of Exercise per Week: 1 day    Minutes of Exercise per Session: 30 min   Stress: Stress Concern Present    Feeling of Stress : To some extent   Social Connections: Socially Isolated    Frequency of Communication with Friends and Family: Once a week    Frequency of Social Gatherings with Friends and Family: Once a week    Attends Jewish Services: Never    Active Member of Clubs or Organizations: No    Attends Club or Organization Meetings: Never    Marital Status:    Housing Stability: Low Risk     Unable to Pay for Housing in the Last Year: No    Number of Places Lived in the Last Year: 1    Unstable Housing in the Last Year: No         Allergies   Review of patient's allergies indicates:   Allergen Reactions    Lovastatin Itching    Pollen extracts     Lovastatin Rash     Not confirmed but pt skeptical           Physical Exam     There were no vitals filed for this visit.          Body mass index is 20.96 kg/m².      General: AOx3, NAD   Respiratory:  Symmetric chest rise, normal effort  Oral Cavity:  Flap healthy. No worrisome lesions.   Oropharynx:  No masses/lesions of the posterior pharyngeal wall. Tonsillar fossa without lesions. Soft palate without masses. Midline uvula.   Neck: Stoma widely patent. Skin paddle healthy with good color and  turgor.Well-healed neck incisions.No LAD. Moderate post-op, post-treatment fibrosis. Submental neck diffusely full.  Face: House Brackmann I bilaterally.    Neopharynx, Trachea: Clear on scope.      Assessment/Plan  Problem List Items Addressed This Visit          Oncology    Larynx cancer    Malignant neoplasm of base of tongue - Primary     ZAY. RTC 1 month.

## 2023-05-08 ENCOUNTER — CLINICAL SUPPORT (OUTPATIENT)
Dept: REHABILITATION | Facility: HOSPITAL | Age: 73
End: 2023-05-08
Attending: RADIOLOGY
Payer: MEDICARE

## 2023-05-08 DIAGNOSIS — R29.898 DECREASED ROM OF NECK: ICD-10-CM

## 2023-05-08 DIAGNOSIS — L90.5 SCAR CONDITIONS AND FIBROSIS OF SKIN: ICD-10-CM

## 2023-05-08 DIAGNOSIS — C01 MALIGNANT NEOPLASM OF BASE OF TONGUE: ICD-10-CM

## 2023-05-08 DIAGNOSIS — I89.0 LYMPHEDEMA DUE TO RADIATION: ICD-10-CM

## 2023-05-08 PROCEDURE — 97162 PT EVAL MOD COMPLEX 30 MIN: CPT | Mod: PN

## 2023-05-08 PROCEDURE — 97140 MANUAL THERAPY 1/> REGIONS: CPT | Mod: PN

## 2023-05-08 PROCEDURE — 97535 SELF CARE MNGMENT TRAINING: CPT | Mod: PN

## 2023-05-08 NOTE — PATIENT INSTRUCTIONS
Dry Brushing program for neck swelling:    10 circles at the base of you neck, above the collarbones   Behind your ear to the collarbone x 10, left and right  Center of neck, along jaw to behind the ear, x 10  Behind your ear to the collarbone x 10, left and right  3 deep belly breaths  10 circles at the base of you neck, above the collarbones    If you have a foam chips pad, wear 1-2 hours, then remove 1-2 hours, alternating on/off throughout the day.

## 2023-05-09 PROBLEM — I89.0 LYMPHEDEMA DUE TO RADIATION: Status: ACTIVE | Noted: 2023-05-09

## 2023-05-09 PROBLEM — R29.898 DECREASED ROM OF NECK: Status: ACTIVE | Noted: 2023-05-09

## 2023-05-09 PROBLEM — L90.5 SCAR CONDITIONS AND FIBROSIS OF SKIN: Status: ACTIVE | Noted: 2023-05-09

## 2023-05-09 NOTE — PLAN OF CARE
"[facility]   Physical Therapy Initial Evaluation   Lower Extremity Lymphedema    Date: 5/8/2023   Name: Cyrus Batres Jr.  Clinic Number: [id number]    Therapy Diagnosis:   Encounter Diagnoses   Name Primary?    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     Scar conditions and fibrosis of skin     Decreased ROM of neck      Physician: Matheus Portillo Jr., MD    Physician Orders: PT Eval and Treat   Medical Diagnosis from Referral: Malignant neoplasm of base of tongue  - Primary   Evaluation Date: 5/8/2023  Authorization Period Expiration: 06-  Plan of Care Expiration: 08-  Progress Note Due: v 10  Visit # / Visits authorized: 1/ 1   FOTO: not completed    Precautions: Standard and patient is NON VERBAL    Time In: 1210  Time Out: 1300  Total Appointment Time (timed & untimed codes): 50 minutes    SUBJECTIVE   Date of onset: 08-04 - diagnosed with malignant neoplasm, larynx.    Cancer Staging  Larynx neoplasm malignant  Staging form: Larynx - Glottis, AJCC 8th Edition  - Clinical stage from 8/6/2020: Stage II (cT2, cN0, cM0)      History of current condition - Nicolás reports: increased tension to left and right SCM, across center of his throat.  "Pelicanism" per patient.  He completed one month of radiation externally to the base of tongue, ending in March 2023.     Diagnosis: malignant neoplasm at base of tongue  Surgery:       12/27/2021 Surgery     1. DL And tracheostomy  2. PEG      1/6/2022 Surgery     1. Diagnostic direct laryngoscopy  2. Bilateral modified neck dissection of levels 2 through 4  3. Total laryngectomy  4. Total thyroidectomy with bilateral paratracheal/upper mediastinal neck dissection  5. Autotransplantation of left inferior parathyroid into left sternocleidomastoid muscle   6. Left anterolateral thigh free flap for closure of total laryngectomy neck skin defect  7. Advancement flap for closure of left thigh donor site advanced " area was 25 x 10 cm     Radiation:  x one month to base of tongue  Chemotherapy:   Plan Name: OP HEAD NECK CISPLATIN WEEKLY + RADIOTHERAPY  Treatment Goal: Curative  Status: Active  Start Date: 1/23/2023  End Date: 3/6/2023  Provider: Venu Tamez MD  Chemotherapy: CISplatin (Platinol) 40 mg/m2 = 66 mg in sodium chloride 0.9% 631 mL chemo infusion, 40 mg/m2 = 66 mg, Intravenous, Clinic/HOD 1 time, 7 of 7 cycles  Administration: 66 mg (1/23/2023), 66 mg (2/13/2023), 66 mg (2/6/2023), 66 mg (2/20/2023), 66 mg (2/27/2023), 66 mg (3/6/2023)  Hormonal Medications: no   Previous Lymphedema Treatment: no  Wears Compression: no   Prior Therapy: OP PT for arm weakness and shoulder pain, DC on 04-;  OP ST MBSS - results were normal.    Social History:  lives with his spouse in 1-story home (no steps)  Occupation: retired  Prior Level of Function: independent   Current Level of Function: minimal difficulty with activities of daily living     Nutrition:  Thin, nutrition via PEG tube  Exercise routine: shoulder exercise per prior PT  Hand dominance: right  DME owned: writing board for communication  Recent Falls:      Patient states: he has difficult wit turning his head left and right, it causes muscle spasms  Pain: 0/10 on VAS currently.   Best: 0/10 Worst: 0/10   Pain location: muscle spasms to SCM, scaleni with cervical rotation             Patient denies chronic heart failure, chronic kidney disease and diabetes.  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        Type 2 diabetes mellitus, without long-term current use of  insulin 11/22/2022    Laryngeal cancer 11/22/2022    Hypocalcemia 11/22/2022    Hyperbilirubinemia 11/22/2022    Elevated transaminase level 11/22/2022    Hyponatremia 11/22/2022    PEG (percutaneous endoscopic gastrostomy) adjustment/replacement/removal 11/22/2022    Hypothyroidism 11/22/2022    Tracheostomy in place 11/16/2022    Malnutrition of mild degree 11/16/2022    Open wound of neck 11/10/2022    Open wnd of pharynx 11/10/2022    Open wound of left thigh 11/10/2022    Open wound of mouth 11/10/2022    Abnormal CT scan, neck 09/22/2022    Neutropenia 09/05/2022    Pneumatosis intestinalis 09/01/2022    Nausea and vomiting 09/01/2022    Pressure injury of sacral region, stage 1 08/18/2022    Iron deficiency anemia due to chronic blood loss 07/07/2022    Postoperative 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    Malignant neoplasm of base of tongue 06/22/2022     Tempus PIK3CA missense variant exon 9 GOF, TP53, CDKN2A and CDKN2B copy number loss, TMB 4.7 m/MB, MSI stable. KEAP1 Missense variant.        Advanced care planning/counseling discussion 06/22/2022    Oral bleeding 05/20/2022    Acute pain of both shoulders 03/10/2022    Bilateral arm weakness 03/10/2022    Aphonia 01/31/2022    Dysphagia, pharyngoesophageal 01/31/2022    Hypocalcemia 01/12/2022    S/P laryngectomy     Adverse effect of adrenal cortical steroids, sequela 01/07/2022    NSVT (nonsustained ventricular tachycardia) 01/13/2021     Negative TM-myoview stress test.         Dehydration 10/27/2020    Larynx cancer 08/04/2020 9/16/20-10/30/20 radiation to larynx and bilateral necks  1/6/22 TL with bilateral neck dissection and total thyroidectomy        On enteral nutrition 07/30/2019    Proteinuria 07/03/2019    Type 2 diabetes mellitus with hyperglycemia 07/02/2019    Vitamin B12 deficiency 07/02/2019    Hyperuricemia 07/02/2019    Hypovitaminosis D 07/02/2019    Type 2 diabetes mellitus with hyperglycemia, without long-term current use of insulin  01/30/2019    Gastroesophageal reflux disease without esophagitis 01/30/2019    Hyperlipidemia 07/19/2018    Melanocytic nevus 07/03/2017     Which and has a mole on the upper chest wall. It appears to be raised and approximately 0.5 cm in diameter and is shaped like to do.        Body mass index (BMI) of 29.0-29.9 in adult 07/03/2017    Overweight 07/03/2017    Paroxysmal atrial fibrillation 07/03/2017     Patient was recently hospitalized with bilateral pneumonia at [facility]. He was treated accordingly with antibiotics and breathing treatment. He was also found to have atrial fibrillation and was started on Eliquis.        Type 2 diabetes mellitus with diabetic arthropathy, with long-term current use of insulin 07/03/2017     Pt has diabetes in 2016 after he had suffered pneumonia in the hospital.        Benign hypertension 06/08/2016     Patient now informs me that he is taking losartan. Not mentioned in his medication list. This is to protect his kidneys.        Fatty liver disease, nonalcoholic 03/29/2012     Based upon [facility] liver  2012 Hep C screen Positive        False positive serological test for hepatitis C 09/15/2009     Pt was screened for Hepatitis panel and positive for Hep C. ( Please see media Feb 2018 - Dr Ramirez- urgent care -[facility])  RNA viral counts were negative. Hence it ws either false positive or cleared infection. Pt will be counseled on this finding and of course he may not be able to donate blood.             Medical History:   Past Medical History:   Diagnosis Date    Abnormal CT scan, neck 09/22/2022    Allergy     pollen extracts    Atrial fibrillation     Chronic anticoagulation     Diabetes mellitus, type 2     GRIFFIN (dyspnea on exertion)     Encounter for blood transfusion     GERD (gastroesophageal reflux disease)     Gout, unspecified     Hypertension     Hypothyroidism 11/22/2022    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    PEG (percutaneous endoscopic gastrostomy)  adjustment/replacement/removal 11/22/2022    Postoperative 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    Tongue cancer     Tracheostomy dependence     Type 2 diabetes mellitus, without long-term current use of insulin 11/22/2022       Surgical History:   Cyrus Batres Jr.  has a past surgical history that includes Microlaryngoscopy (N/A, 3/17/2020); Laryngoscopy (N/A, 8/4/2020); Laryngoscopy (N/A, 3/16/2021); Laryngoscopy (N/A, 4/1/2021); Laryngoscopy (N/A, 12/9/2021); Tracheostomy (N/A, 12/27/2021); Direct laryngobronchoscopy (N/A, 12/27/2021); Laryngectomy (N/A, 1/6/2022); Dissection of neck (Bilateral, 1/6/2022); Thyroidectomy (1/6/2022); Laryngoscopy (N/A, 1/6/2022); Reimplantation of parathyroid tissue (N/A, 1/6/2022); Flap procedure (Right, 1/6/2022); Laryngoscopy (N/A, 4/27/2022); Insertion of tunneled central venous catheter (CVC) with subcutaneous port (N/A, 6/9/2022); Pharyngectomy (11/9/2022); Glossectomy (Bilateral, 11/9/2022); Direct laryngoscopy (11/9/2022); Dissection of neck (Bilateral, 11/9/2022); Skin split graft (Right, 11/9/2022); Flap procedure (Left, 11/9/2022); and Insertion of tunneled central venous catheter (CVC) with subcutaneous port (Right, 1/12/2023).    Medications:   Cyrus has a current medication list which includes the following prescription(s): acetaminophen, calcitriol, calcium carbonate, calcium carbonate, esomeprazole, famotidine, ibuprofen, jevity 1.5 uspa, levothyroxine, losartan, metformin, promethazine, trazodone, zolpidem, and [DISCONTINUED] aspirin.    Allergies:   Review of patient's allergies indicates:   Allergen Reactions    Lovastatin Itching    Pollen extracts     Lovastatin Rash     Not confirmed but pt skeptical        Imaging:   CT:   11/30/2022     FINDINGS:  Postsurgical change of total laryngectomy, glossectomy, pharyngectomy with flap reconstruction, and modified bilateral neck dissection.  Surgical clips throughout the operative bed. Salivary bypass tube opacified with  fluid, with increased fluid distension of the georgie-esophagus compared to prior.     Persistent diffuse soft tissue edema and stranding.  Similar size of irregular complex fluid collection within the midline submandibular space, measuring approximately 4.5 x 1.5 x 1.7 cm (axial 2, image 98; sagittal 602, image 166), previously 4.4 x 1.7 x 1.7 cm. Similar size of irregular complex fluid collection within the left lateral submandibular space, measuring approximately 3.3 x 1.2 x 1.7 cm (axial 2, image 94; sagittal 602, image 197). Decreased size of irregular complex fluid collection superior to the tracheostomy, measuring approximately 3.7 x 1.3 x 1.0 cm (axial 2, image 132; sagittal 602, image 129), previously 5.0 x 2.7 x 2.5 cm.  No new large focal collections identified.     Near-complete opacification of the left maxillary sinus with circumferential mucoperiosteal thickening central fluid.  Mild patchy mucosal thickening in fluid elsewhere throughout the paranasal sinuses.  Small bilateral mastoid effusions.     Thyroid is surgically absent.     Stable filling defects of the bilateral internal jugular veins the level of the operative site, likely small amount of thrombus.  Presumed partial resection of the proximal internal jugular veins, recommend correlation with surgical history.  0 vasculature appears patent.  Minimal calcific atherosclerosis of the bilateral carotid bifurcations.     Interval removal left-sided chest wall port, now with small air-fluid collection at that level, measuring approximately 2.5 x 0.8 cm (axial 2, image 173).     Patent with tracheostomy.  Small bilateral pleural effusions, partially evaluated.  Mild biapical bandlike attenuation, may reflect post radiation change.     No acute fracture or suspicious lytic or sclerotic lesion.  Mild cervical spondylosis, similar to priors.  No new fracture or malalignment..     Impression:     1. Postsurgical change as described above.  Persistent  "inflammatory stranding and multifocal fluid collection as above.  Nonspecific in the early postoperative setting, but infection/abscess can not be excluded.  Decreased size of irregular complex fluid collection superior to the tracheostomy tube, but the remainder of the fluid collections are similar in size.  2. Interval removal of left chest wall port, now with small air-fluid collection measuring up to 2.5 cm at that level.  3. Additional stable findings as above.  This report was flagged in Epic as abnormal.     Nicolás's goals: decreased "pelican neck" (scar tissue and stiffness), increased cervical active range of motion     OBJECTIVE   Patient received from waiting room. Ambulatory, casually dressed  Mental status: alert, oriented to person, place, and time, normal mood, behavior, speech, dress, motor activity, and thought processes  Appearance: Casually dressed  Behavior:  calm, cooperative, and adequate rapport can be established  Attention Span and Concentration:  Normal    Amount of Swelling: Mild  Location of Swelling: anterior neck and SCF  Skin Integrity: Dry and thickened  Palpation/Texture: firm, irregular, and hard  Circulation: warm, well perfused    Resting position of head is neutral, left shoulder elevation - mild  Endfeels are restricted with capsular quality.    PALPATION:  Anterior, lateral left and right of midline with severe scarring and radiation fibrosis, impeding c/s AROM, passive soft tissue mobility.  Directly impacts speech and swallow functionality, turning head while performing ADL's, eg. looking over shoulder with driving or [place] activities.    Fibrosis is very thickened and therefore unable to palpate hyoid bone, differentiate supra/infra hyoid, cervical muscle.  (+)  boggy soft tissue endfeel to mm of mastication externally.  Intraoral palpation deferred Thyroid and cricoid cartilages are poorly defined without accessory mobility palpable.    TMJ AROM:  openin mm  closure - " full  left and right lateral excursion - 2  teeth width  protraction - > 5 mm  retraction - minimal    Jaw mobility is symmetrical without clicking    Facial - symmetric, no deficits noted with gross screening of Facial mm, EOMI          Girth Measurements (in centimeters)  LANDMARK Head/Neck Measurement Face  Right Face Left    Diagonal Head Circumference 55       Vertical Submental Circumference        Mandibular Angle to Mandibular Angle 27.5       Tragus Angle to Tragus Angle 32.0       Neck Superior 37.5       Neck Middle 34.0       Neck Inferior  34.5       Tragus to Mental Protuberance        Tragus to Mouth Angle        Mandibular Angle to Nasal Wing        Mandibular Angle to Internal Eye Corner        Mandibular Angle to External Eye Corner        Mental Protuberance to Internal Eye Corner        Mandibular Angle to Mental Protuberance        Totals         Total girth measurement-   220.5 cm       Total Girth Reduction                 Picture of patient in sitting, second treatment; taken via Epic Haiku on therapist's cell phone with verbal consent from patient:  To be completed next treatment    CMS Impairment/Limitation/Restriction for FOTO Survey    Therapist reviewed FOTO scores for Cyrus Batres  on 5/8/2023.   FOTO documents entered into [email or web address] - see Media section.     TREATMENT   Total Treatment time separate from Evaluation: 38 minutes        manual therapy techniques: Myofacial release, Manual Lymphatic Drainage, and Soft tissue Mobilization were applied to the: anterior neck  for 10 minutes, including:  Long neck sequence  Fibrosis techniques to submental area  Brushing lateral to posterior auricular watershed then to SUPRACLAVICULAR FOSSA left and right  Deep breathing  Short neck    Self/home management x 25 mins:  Fabricated and fit foam chips pad to anterior neck, issued handout with education and review of wear and care of the pad  Issued handout and dry surgical brush with instruction and  practice in dry brushing.  Issued written home exercise program of cervical diagonals, cervical retraction and doorway stretches x 2 hand positions with review and practice to increase active range of motion and activation of muscle and joint pumps  ++ Patient noted understanding of all educational provisions and was able to return demonstrate techniques using the handouts as guides.       PATIENT EDUCATION AND HOME EXERCISES   Education provided:   - Patient provided with verbal education regarding lymphedema etiology and management plans.   - Patient and family member verbalized understanding of education and are in agreement with treatment plan.    Written Home Exercises Provided: yes.  Exercises were reviewed and Nicolás was able to demonstrate them prior to the end of the session.  Nicolás demonstrated good  understanding of the education provided.     See EMR under Patient Instructions for exercises provided 5/8/2023.    ASSESSMENT   Cyrus is a 72 y.o. male referred to outpatient Physical Therapy with a medical diagnosis of Malignant neoplasm of base of tongue  - Primary . This patient presents with stage 2+ lymphedema due to radiation therapy fibrosis and scarring.  Patient's deficits include swelling of the lower face, alexander and left and right anterior , increased pain, increased stiffness in the bilateral TEMPOROMANDIBULAR JOINT, cervical spine, as well as difficulty performing activities which require turning his head left or right, such as driving and looking over his shoulder, and placing the pt at higher risk of infection due to his skin condition and fibrosis.  Therapist's recommendation includes complete decongestive therapy, elevation, exercise, and compression garments for one month to reduce swelling. Cyrus would benefit from complete decongestive therapy to reduce fibrosis and scarring, lymphedema, improve active range of motion of cervical spine and overall quality of life, reduce risk of wounds  and poor skin integrity as well as education to self-maintain reduction with use of compression garments.    At the end of therapy, he noted that he felt decreased tension under his chin and that the tissue had softened.     Pt prognosis is Good.   Pt will benefit from skilled outpatient Physical Therapy to address the deficits stated above and in the chart below, provide pt/family education, and to maximize pt's level of independence.     Plan of care discussed with patient: Yes  Pt's spiritual, cultural and educational needs considered and patient is agreeable to the plan of care and goals as stated below:     Anticipated Barriers for therapy: severity of radiation therapy fibrosis and scarring    History  Co-morbidities and personal factors that may impact the plan of care Co-morbidities:   diabetes, HTN, immunosuppression, and protein malnutrition, hyperglycemia with insulin dependence, hyperlipidemia, status-post laryngectomy and modified bilateral neck dissection of levels 2 through 4      Personal Factors:   Aphonia 2' surgeries     high   Examination  Body Structures and Functions, activity limitations and participation restrictions that may impact the plan of care Body Regions:   head  neck    Body Systems:    gross symmetry  ROM  strength  scar formation  skin integrity  skin color    Participation Restrictions:   none    Activity limitations:   Learning and applying knowledge  no deficits    General Tasks and Commands  no deficits    Communication  Non-verbal.  Communicates via writing on communication board    Mobility  no deficits    Self care  no deficits    Domestic Life  no deficits    Interactions/Relationships  no deficits    Life Areas  no deficits    Community and Social Life  no deficits         moderate   Clinical Presentation unstable clinical presentation with unpredictable characteristics high   Decision Making/ Complexity Score: moderate      Goals:  The following goals were discussed with  the patient and patient is in agreement with them as to be addressed in the treatment plan.     Short Term Goals: (6 weeks)   Goals: Progressing / MET Date Established Date Assessed   1. Patient will demonstrate decreased localized lymphedema to the jawline by  palpation and visualization of improved symmetry left and right  PROGRESSING 05-    2. Patient will demonstrate 100% knowledge of lymphedema precautions and signs of infection to allow for reduced lymphedema risk, infection risk, and/or exacerbation of condition.  PROGRESSING 05-    3. Patient or caregiver will perform self-bandaging techniques and/or wearing of compression garments to allow for lymphatic drainage support, skin elasticity, and reduction in shape and size of limb.  PROGRESSING 05-    4. Patient will perform self lymph drainage techniques to areas within reach to enhance lymphatic drainage and skin condition.  PROGRESSING 05-    5. Patient will tolerate daily activities with external foam chips compression/ bandaging to allow for lymphatic and venous support.  PROGRESSING 05-      Long Term Goals: (12  weeks)   Goals: Progressing / MET Date Established Date Assessed   1. Patient will show decreased total girth measurement in sum of measurements to head and neck by up to 2 cm  to allow for improved comfort. PROGRESSING 05-    2. Patient will show reduction in density to mild/moderate or less with improved contour of limb to allow for cosmesis, symmetry, infection risk reduction, excursion of sift tissues for swallowing to clear secretions to protect the airway PROGRESSING 05-    3. Patient to malinda/doff compression garment with daily compliance to assist in lymphedema management, skin elasticity, and tissue density PROGRESSING 05-    4. Patient to show improved postural awareness and alignment. PROGRESSING 05-    5. Patient to be independent and compliant with home exercise program to  allow for increased function in affected limb. PROGRESSING 05-      PLAN   Plan of care Certification: 5/8/2023 to 08--8-2023.    Outpatient Physical Therapy 2 times weekly for 6 weeks for Complete Decongestive Therapy:  Manual lymphatic drainage, Multilayered short stretch bandaging, Pneumatic compression, Therapeutic exercises, and Patient education as deemed necessary to achieve stated goals. (interventions: Manual Therapy, Neuromuscular Re-ed, Orthotic Management and Training, Patient Education, Self Care, Therapeutic Activities, and Therapeutic Exercise.)    Next visit:   Review HOME EXERCISE PROGRAM  Initiate manual lymphatic drainage and scar and fibrosis management   Consider recommending [employer] Swell spot for long term compression to anterior neck    Ena Mott, PT CLT  05-    I CERTIFY THE NEED FOR THESE SERVICES FURNISHED UNDER THIS PLAN OF TREATMENT AND WHILE UNDER MY CARE   Physician's comments:     Physician's Signature: ___________________________________________________

## 2023-05-10 ENCOUNTER — OUTPATIENT CASE MANAGEMENT (OUTPATIENT)
Dept: ADMINISTRATIVE | Facility: OTHER | Age: 73
End: 2023-05-10
Payer: MEDICARE

## 2023-05-10 NOTE — PROGRESS NOTES
Outpatient Care Management  Plan of Care Follow Up Visit    Patient: Cyrus Batres Jr.  MRN: [id number]  Date of Service: 05/10/2023  Completed by: Eugenia Agudelo RN  Referral Date: 09/02/2022    No chief complaint on file.      Brief Summary: Care plan goals met   Next Steps: OPCM Case Closure. Dis-enroll from OPCM.

## 2023-05-15 ENCOUNTER — CLINICAL SUPPORT (OUTPATIENT)
Dept: REHABILITATION | Facility: HOSPITAL | Age: 73
End: 2023-05-15
Payer: MEDICARE

## 2023-05-15 DIAGNOSIS — L90.5 SCAR CONDITIONS AND FIBROSIS OF SKIN: ICD-10-CM

## 2023-05-15 DIAGNOSIS — I89.0 LYMPHEDEMA DUE TO RADIATION: Primary | ICD-10-CM

## 2023-05-15 DIAGNOSIS — R29.898 DECREASED ROM OF NECK: ICD-10-CM

## 2023-05-15 PROCEDURE — 97140 MANUAL THERAPY 1/> REGIONS: CPT | Mod: PN

## 2023-05-15 NOTE — PROGRESS NOTES
[facility]   Physical Therapy Treatment Note   Lymphedema Upper Extremity    Name: Cyrus Batres Jr.  Clinic Number: [id number]    Therapy Diagnosis:   Encounter Diagnoses   Name Primary?    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   He reports significant increased saliva production.  His wife reports that she sees a difference in the texture and coloration of his skin, it appears more supple.  She states that he has increased range of motion to his cervical spine, he does not turn his torso to look left and right over his shoulder.    Nicolás reported wearing compression outside of therapy visits: Yes - to anterior neck as instructed at initial evaluation  Nicolás was compliant with home exercise program.    Response to previous treatment: excellent  Functional change: improvement in skin quality and cervical spine range of motion     Pain: 0/10  Location: bilateral anterior neck      OBJECTIVE     Girth Measurements:     Girth Measurements (in centimeters)  LANDMARK Head/Neck Measurement Face  Right Face Left    Diagonal Head Circumference 55       Vertical Submental Circumference         Mandibular Angle to Mandibular Angle 27.5       Tragus Angle to Tragus Angle 32.0       Neck Superior 37.5       Neck Middle 34.0       Neck Inferior  34.5       Tragus to Mental Protuberance         Tragus to Mouth Angle          Mandibular Angle to Nasal Wing         Mandibular Angle to Internal Eye Corner         Mandibular Angle to External Eye Corner         Mental Protuberance to Internal Eye Corner         Mandibular Angle to Mental Protuberance         Totals         Total girth measurement-   220.5 cm       Total Girth Reduction                   Treatment     Nicolás received the treatments listed below:      Patient received from waiting room. Accompanied by his wife.      manual therapy techniques: Complete Decongestive Therapy was applied to the: head and neck for 40 minutes, including: manual lymphatic drainage and short stretch compression bandaging     MANUAL LYMPHATIC DRAINAGE:   Short neck  Prom with end range of motion prolonged passive stretch to anterior chest, shoulder depression  Long neck  Full face  Anterior neck routed posterior to the radiation therapy zone and then to terminus  Fibrosis techniques to scar and fibrosis at anterior neck/radiation therapy zone  Mobilization, grade 2 of hyoid, cartilages in anterior neck and trachea  S deformation and elongation of SCM and anterior strap muscle  PPS to left and right SCM with overpressure  Redo long neck  Deep breathing  PA glides, grade 2-3 from T3 -> C2, to increase accessory mobility    Reviewed HOME EXERCISE PROGRAM and self manual lymphatic drainage    Patient noted to have improved segmentation of cervical spine and head from torso with cervical rotation and extension.         therapeutic exercises to develop ROM, flexibility, and posture for 0 minutes including:       Patient Education and Home Exercises     therapeutic activities to improve functional performance for 0  minutes, including:    Garments:  None - will consider swell spot for neck  Garments Ordered: No    Home Exercises and Patient Education Provided     Education provided:   - Educated in self massage to abdominal areas, and cervical region  - Patient to leave short-stretch compression bandages  intact for 12-24 hours as tolerated, discontinue with any problems, return rolled bandages next session. Wash and wear schedules confirmed.   - Continue HEP of AROM, stretching, and postural correction.   - Educated on self or assisted bandaging, compression options, and risk reduction    Written Home Exercises Provided: Patient instructed to cont prior HEP. Exercises were reviewed and Nicolás was able to demonstrate them prior to the end of the session.  Nicolás demonstrated good  understanding of the education provided. See EMR under Patient Instructions for exercises provided during therapy sessions    ASSESSMENT     Nicolás appears to have been very compliant with HOME EXERCISE PROGRAM, validted by his wife.  She was very pleased wit the improvement to texture and color of the anterior neck soft tissues. He demonstrated improved skin quality and suppleness, improved cervical spine active range of motion.    Nicolás Is progressing well towards his goals.   Pt prognosis is Good.     Patient will continue to benefit from skilled outpatient physical therapy to address the deficits listed in the problem list box on initial evaluation, provide patient/family education and to maximize patient's level of independence in the home and community environment.     Patient's spiritual, cultural and educational needs considered and patient agreeable to plan of care and goals.     Anticipated barriers to physical therapy: severity of fibrosis and scarring    Goals:   The following goals were discussed with the patient and patient is in agreement with them as to be addressed in the treatment plan.      Short Term Goals: (6 weeks)   Goals: Progressing / MET Date Established Date Assessed   1. Patient will demonstrate decreased localized lymphedema to the jawline by  palpation and visualization of improved symmetry left and right  PROGRESSING 05-     2. Patient will demonstrate 100% knowledge of lymphedema precautions and signs  of infection to allow for reduced lymphedema risk, infection risk, and/or exacerbation of condition.  PROGRESSING 05-     3. Patient or caregiver will perform self-bandaging techniques and/or wearing of compression garments to allow for lymphatic drainage support, skin elasticity, and reduction in shape and size of limb.  PROGRESSING 05-     4. Patient will perform self lymph drainage techniques to areas within reach to enhance lymphatic drainage and skin condition.  PROGRESSING 05-     5. Patient will tolerate daily activities with external foam chips compression/ bandaging to allow for lymphatic and venous support.  PROGRESSING 05-        Long Term Goals: (12  weeks)   Goals: Progressing / MET Date Established Date Assessed   1. Patient will show decreased total girth measurement in sum of measurements to head and neck by up to 2 cm  to allow for improved comfort. PROGRESSING 05-     2. Patient will show reduction in density to mild/moderate or less with improved contour of limb to allow for cosmesis, symmetry, infection risk reduction, excursion of sift tissues for swallowing to clear secretions to protect the airway PROGRESSING 05-     3. Patient to malinda/doff compression garment with daily compliance to assist in lymphedema management, skin elasticity, and tissue density PROGRESSING 05-     4. Patient to show improved postural awareness and alignment. PROGRESSING 05-     5. Patient to be independent and compliant with home exercise program to allow for increased function in affected limb. PROGRESSING 05-         PLAN     Continue Physical Therapy  2x weekly for 6 weeks Complete Decongestive Therapy:  Manual lymphatic drainage, Multilayered short stretch bandaging, Pneumatic compression, Therapeutic exercises, and Patient education as deemed necessary to achieve stated goals.    Ena Mott, PT CLT   5/15/2023

## 2023-05-17 ENCOUNTER — PATIENT MESSAGE (OUTPATIENT)
Dept: HEMATOLOGY/ONCOLOGY | Facility: CLINIC | Age: 73
End: 2023-05-17

## 2023-05-17 ENCOUNTER — LAB VISIT (OUTPATIENT)
Dept: LAB | Facility: HOSPITAL | Age: 73
End: 2023-05-17
Attending: PHYSICIAN ASSISTANT
Payer: MEDICARE

## 2023-05-17 ENCOUNTER — CLINICAL SUPPORT (OUTPATIENT)
Dept: REHABILITATION | Facility: HOSPITAL | Age: 73
End: 2023-05-17
Payer: MEDICARE

## 2023-05-17 DIAGNOSIS — I89.0 LYMPHEDEMA DUE TO RADIATION: Primary | ICD-10-CM

## 2023-05-17 DIAGNOSIS — R29.898 DECREASED ROM OF NECK: ICD-10-CM

## 2023-05-17 DIAGNOSIS — E20.9 HYPOPARATHYROIDISM, UNSPECIFIED HYPOPARATHYROIDISM TYPE: ICD-10-CM

## 2023-05-17 DIAGNOSIS — E11.65 TYPE 2 DIABETES MELLITUS WITH HYPERGLYCEMIA, WITH LONG-TERM CURRENT USE OF INSULIN: ICD-10-CM

## 2023-05-17 DIAGNOSIS — Z79.4 TYPE 2 DIABETES MELLITUS WITH HYPERGLYCEMIA, WITH LONG-TERM CURRENT USE OF INSULIN: ICD-10-CM

## 2023-05-17 DIAGNOSIS — L90.5 SCAR CONDITIONS AND FIBROSIS OF SKIN: ICD-10-CM

## 2023-05-17 DIAGNOSIS — E55.9 HYPOVITAMINOSIS D: ICD-10-CM

## 2023-05-17 LAB
25(OH)D3+25(OH)D2 SERPL-MCNC: 40 NG/ML (ref 30–96)
ALBUMIN SERPL BCP-MCNC: 4 G/DL (ref 3.5–5.2)
ALP SERPL-CCNC: 279 U/L (ref 55–135)
ALT SERPL W/O P-5'-P-CCNC: 34 U/L (ref 10–44)
ANION GAP SERPL CALC-SCNC: 8 MMOL/L (ref 8–16)
AST SERPL-CCNC: 31 U/L (ref 10–40)
BILIRUB SERPL-MCNC: 0.9 MG/DL (ref 0.1–1)
BUN SERPL-MCNC: 21 MG/DL (ref 8–23)
CA-I BLDV-SCNC: 1.12 MMOL/L (ref 1.06–1.42)
CALCIUM SERPL-MCNC: 8.6 MG/DL (ref 8.7–10.5)
CHLORIDE SERPL-SCNC: 108 MMOL/L (ref 95–110)
CHOLEST SERPL-MCNC: 162 MG/DL (ref 120–199)
CHOLEST/HDLC SERPL: 2.9 {RATIO} (ref 2–5)
CO2 SERPL-SCNC: 27 MMOL/L (ref 23–29)
CREAT SERPL-MCNC: 0.9 MG/DL (ref 0.5–1.4)
EST. GFR  (NO RACE VARIABLE): >60 ML/MIN/1.73 M^2
ESTIMATED AVG GLUCOSE: 100 MG/DL (ref 68–131)
GLUCOSE SERPL-MCNC: 100 MG/DL (ref 70–110)
HBA1C MFR BLD: 5.1 % (ref 4–5.6)
HDLC SERPL-MCNC: 56 MG/DL (ref 40–75)
HDLC SERPL: 34.6 % (ref 20–50)
LDLC SERPL CALC-MCNC: 85.6 MG/DL (ref 63–159)
NONHDLC SERPL-MCNC: 106 MG/DL
POTASSIUM SERPL-SCNC: 4 MMOL/L (ref 3.5–5.1)
PROT SERPL-MCNC: 7.1 G/DL (ref 6–8.4)
PTH-INTACT SERPL-MCNC: 14.3 PG/ML (ref 9–77)
SODIUM SERPL-SCNC: 143 MMOL/L (ref 136–145)
T4 FREE SERPL-MCNC: 1.04 NG/DL (ref 0.71–1.51)
TRIGL SERPL-MCNC: 102 MG/DL (ref 30–150)
TSH SERPL DL<=0.005 MIU/L-ACNC: 0.44 UIU/ML (ref 0.4–4)

## 2023-05-17 PROCEDURE — 80061 LIPID PANEL: CPT | Performed by: PHYSICIAN ASSISTANT

## 2023-05-17 PROCEDURE — 97140 MANUAL THERAPY 1/> REGIONS: CPT | Mod: PN

## 2023-05-17 PROCEDURE — 83036 HEMOGLOBIN GLYCOSYLATED A1C: CPT | Performed by: PHYSICIAN ASSISTANT

## 2023-05-17 PROCEDURE — 82330 ASSAY OF CALCIUM: CPT | Performed by: PHYSICIAN ASSISTANT

## 2023-05-17 PROCEDURE — 36415 COLL VENOUS BLD VENIPUNCTURE: CPT | Mod: PO | Performed by: PHYSICIAN ASSISTANT

## 2023-05-17 PROCEDURE — 84443 ASSAY THYROID STIM HORMONE: CPT | Performed by: PHYSICIAN ASSISTANT

## 2023-05-17 PROCEDURE — 83970 ASSAY OF PARATHORMONE: CPT | Performed by: PHYSICIAN ASSISTANT

## 2023-05-17 PROCEDURE — 82306 VITAMIN D 25 HYDROXY: CPT | Performed by: PHYSICIAN ASSISTANT

## 2023-05-17 PROCEDURE — 80053 COMPREHEN METABOLIC PANEL: CPT | Performed by: PHYSICIAN ASSISTANT

## 2023-05-17 PROCEDURE — 84439 ASSAY OF FREE THYROXINE: CPT | Performed by: PHYSICIAN ASSISTANT

## 2023-05-17 NOTE — PROGRESS NOTES
[facility]   Physical Therapy Treatment Note   Lymphedema Upper Extremity    Name: Cyrus Batres Jr.  Clinic Number: [id number]    Therapy Diagnosis:   Encounter Diagnoses   Name Primary?    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  He reports that he is not feeling compression with the foam chips pad.  He is interested in using a Swell Spot for additional containment.    Nicolás reported wearing compression outside of therapy visits: Yes - to anterior neck as instructed at initial evaluation  Nicolás was compliant with home exercise program.    Response to previous treatment: excellent  Functional change: improvement in skin quality and cervical spine range of motion     Pain: 0/10  Location: bilateral anterior neck      OBJECTIVE     Girth Measurements:     Girth Measurements (in centimeters)  LANDMARK Head/Neck Measurement Face  Right Face Left    Diagonal Head Circumference 55       Vertical Submental Circumference         Mandibular Angle to Mandibular Angle 27.5       Tragus Angle to Tragus Angle 32.0       Neck Superior 37.5       Neck Middle 34.0       Neck Inferior  34.5       Tragus to Mental Protuberance         Tragus to Mouth Angle         Mandibular Angle to Nasal Wing         Mandibular Angle to Internal Eye Corner         Mandibular Angle to External Eye Corner         Mental Protuberance to Internal Eye Corner          Mandibular Angle to Mental Protuberance         Totals         Total girth measurement-   220.5 cm       Total Girth Reduction                   Treatment     Nicolás received the treatments listed below:      Patient received from waiting room. Accompanied by his wife.      manual therapy techniques: Complete Decongestive Therapy was applied to the: head and neck for 40 minutes, including: manual lymphatic drainage and short stretch compression bandaging     MANUAL LYMPHATIC DRAINAGE:   Short neck  Prom with end range of motion prolonged passive stretch to anterior chest, shoulder depression  Long neck  Anterior neck routed posterior to the radiation therapy zone and then to terminus  Fibrosis techniques to scar and fibrosis at anterior neck/radiation therapy zone - bulk of treatment  Mobilization,  cartilages in anterior neck and trachea  S deformation and elongation of SCM and anterior strap muscle  PPS to left and right SCM with overpressure  Redo long neck  Deep breathing  PA glides, grade 2-3 from T3 -> C2, to increase accessory mobility    Reviewed HOME EXERCISE PROGRAM and self manual lymphatic drainage    Patient noted to have improved segmentation of cervical spine and head from torso with cervical rotation and extension.     therapeutic exercises to develop ROM, flexibility, and posture for 2 minutes including:   Active assisted range of motion -> resisted range of motion  cervical retraction for mobilization of anterior neck soft tissues.      Patient Education and Home Exercises     therapeutic activities to improve functional performance for 0  minutes, including:    Garments:  None - will consider [employer] [facility] spot for neck - information given 05-  Garments Ordered: No    Home Exercises and Patient Education Provided     Education provided:   - Educated in self massage to abdominal areas, and cervical region  - Patient to leave short-stretch compression bandages intact for 12-24 hours  as tolerated, discontinue with any problems, return rolled bandages next session. Wash and wear schedules confirmed.   - Continue HEP of AROM, stretching, and postural correction.   - Educated on self or assisted bandaging, compression options, and risk reduction    Written Home Exercises Provided: Patient instructed to cont prior HEP. Exercises were reviewed and Nicolás was able to demonstrate them prior to the end of the session.  Nicolás demonstrated good  understanding of the education provided. See EMR under Patient Instructions for exercises provided during therapy sessions    ASSESSMENT     Nicolás had (+) response to manual therapy. There was visible and palpable decrease in submental fibrosis, improvement in muscle play of left and right SCM.       Nicolás Is progressing well towards his goals.   Pt prognosis is Good.     Patient will continue to benefit from skilled outpatient physical therapy to address the deficits listed in the problem list box on initial evaluation, provide patient/family education and to maximize patient's level of independence in the home and community environment.     Patient's spiritual, cultural and educational needs considered and patient agreeable to plan of care and goals.     Anticipated barriers to physical therapy: severity of fibrosis and scarring    Goals:   The following goals were discussed with the patient and patient is in agreement with them as to be addressed in the treatment plan.      Short Term Goals: (6 weeks)   Goals: Progressing / MET Date Established Date Assessed   1. Patient will demonstrate decreased localized lymphedema to the jawline by  palpation and visualization of improved symmetry left and right  PROGRESSING 05-     2. Patient will demonstrate 100% knowledge of lymphedema precautions and signs of infection to allow for reduced lymphedema risk, infection risk, and/or exacerbation of condition.  PROGRESSING 05-     3. Patient or caregiver  will perform self-bandaging techniques and/or wearing of compression garments to allow for lymphatic drainage support, skin elasticity, and reduction in shape and size of limb.  PROGRESSING 05-     4. Patient will perform self lymph drainage techniques to areas within reach to enhance lymphatic drainage and skin condition.  PROGRESSING 05-     5. Patient will tolerate daily activities with external foam chips compression/ bandaging to allow for lymphatic and venous support.  PROGRESSING 05-        Long Term Goals: (12  weeks)   Goals: Progressing / MET Date Established Date Assessed   1. Patient will show decreased total girth measurement in sum of measurements to head and neck by up to 2 cm  to allow for improved comfort. PROGRESSING 05-     2. Patient will show reduction in density to mild/moderate or less with improved contour of limb to allow for cosmesis, symmetry, infection risk reduction, excursion of sift tissues for swallowing to clear secretions to protect the airway PROGRESSING 05-     3. Patient to malinda/doff compression garment with daily compliance to assist in lymphedema management, skin elasticity, and tissue density PROGRESSING 05-     4. Patient to show improved postural awareness and alignment. PROGRESSING 05-     5. Patient to be independent and compliant with home exercise program to allow for increased function in affected limb. PROGRESSING 05-         PLAN     Continue Physical Therapy  2x weekly for 6 weeks Complete Decongestive Therapy:  Manual lymphatic drainage, Multilayered short stretch bandaging, Pneumatic compression, Therapeutic exercises, and Patient education as deemed necessary to achieve stated goals.    Ena Mott, PT CLT   5/17/2023

## 2023-05-22 ENCOUNTER — CLINICAL SUPPORT (OUTPATIENT)
Dept: REHABILITATION | Facility: HOSPITAL | Age: 73
End: 2023-05-22
Payer: MEDICARE

## 2023-05-22 DIAGNOSIS — R29.898 DECREASED ROM OF NECK: ICD-10-CM

## 2023-05-22 DIAGNOSIS — L90.5 SCAR CONDITIONS AND FIBROSIS OF SKIN: ICD-10-CM

## 2023-05-22 DIAGNOSIS — I89.0 LYMPHEDEMA DUE TO RADIATION: Primary | ICD-10-CM

## 2023-05-22 PROCEDURE — 97140 MANUAL THERAPY 1/> REGIONS: CPT | Mod: PN

## 2023-05-22 NOTE — PROGRESS NOTES
[facility]   Physical Therapy Treatment Note   Lymphedema Upper Extremity    Name: Cyrus Batres Jr.  Clinic Number: [id number]    Therapy Diagnosis:   Encounter Diagnoses   Name Primary?    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    Scar conditions and fibrosis of skin     Decreased ROM of neck      Physician: Matheus Portillo Jr., MD    Visit Date: 5/22/2023    Physician Orders: PT Eval and Treat   Medical Diagnosis from Referral: Malignant neoplasm of base of tongue  - Primary   Evaluation Date: 5/8/2023  Authorization Period Expiration: 06-  Plan of Care Expiration: 08-  Progress Note Due: v 10  Visit # / Visits authorized: 3/ 10   FOTO: not completed       Time In: 1600  Time Out: 1645  Total Billable Time: 45 minutes    SUBJECTIVE     Nicolás reports: He did well after last appointment, noting increased salivation.     Nicolás reported wearing compression outside of therapy visits: Yes - to anterior neck as instructed at initial evaluation  Nicolás was compliant with home exercise program.    Response to previous treatment: excellent  Functional change: improvement in skin quality and cervical spine range of motion     Pain: 0/10  Location: bilateral anterior neck      OBJECTIVE     Girth Measurements:     Girth Measurements (in centimeters)  LANDMARK Head/Neck Measurement Face  Right Face Left    Diagonal Head Circumference 55       Vertical Submental Circumference         Mandibular Angle to Mandibular Angle 27.5       Tragus Angle to Tragus Angle 32.0       Neck Superior 37.5       Neck Middle 34.0       Neck Inferior  34.5       Tragus to Mental Protuberance         Tragus to Mouth Angle         Mandibular Angle to Nasal Wing         Mandibular Angle to Internal Eye Corner         Mandibular Angle to External Eye Corner         Mental Protuberance to Internal Eye Corner         Mandibular Angle to Mental Protuberance         Totals         Total girth measurement-   220.5 cm        Total Girth Reduction                   Treatment     Nicolás received the treatments listed below:      Patient received from waiting room. Accompanied by his wife.      manual therapy techniques: Complete Decongestive Therapy was applied to the: head and neck for 40 minutes, including: manual lymphatic drainage and short stretch compression bandaging     MANUAL LYMPHATIC DRAINAGE:   Short neck  Prom with end range of motion prolonged passive stretch to anterior chest, shoulder depression  Grade 2-3 depression mobilizations to the first rib, right and left   Long neck  Anterior neck routed posterior to the radiation therapy zone and then to terminus  Fibrosis techniques to scar and fibrosis at anterior neck/radiation therapy zone - bulk of treatment  Mobilization,  cartilages in anterior neck and trachea  S deformation and elongation of SCM and anterior strap muscle  PPS to left and right SCM with overpressure  Redo long neck  Deep breathing      Reviewed HOME EXERCISE PROGRAM and self manual lymphatic drainage    Patient noted to have improved segmentation of cervical spine and head from torso with cervical rotation and extension.     therapeutic exercises to develop ROM, flexibility, and posture for 2 minutes including:   Active assisted range of motion -> resisted range of motion  cervical retraction for mobilization of anterior neck soft tissues.      Patient Education and Home Exercises     therapeutic activities to improve functional performance for 0  minutes, including:    Garments:  None - will consider Versiface swell spot for neck - information given 05-  Garments Ordered: No    Home Exercises and Patient Education Provided     Education provided:   - Educated in self massage to abdominal areas, and cervical region  - Patient to leave short-stretch compression bandages intact for 12-24 hours as tolerated, discontinue with any problems, return rolled bandages next session. Wash and wear schedules  confirmed.   - Continue HEP of AROM, stretching, and postural correction.   - Educated on self or assisted bandaging, compression options, and risk reduction    Written Home Exercises Provided: Patient instructed to cont prior HEP. Exercises were reviewed and Nicolás was able to demonstrate them prior to the end of the session.  Nicoáls demonstrated good  understanding of the education provided. See EMR under Patient Instructions for exercises provided during therapy sessions    ASSESSMENT     Nicolás had (+) response to manual therapy. There was visible and palpable sift tissue extensibility to the anterior neck and jawline.  Nicolás Is progressing well towards his goals.   Pt prognosis is Good.     Patient will continue to benefit from skilled outpatient physical therapy to address the deficits listed in the problem list box on initial evaluation, provide patient/family education and to maximize patient's level of independence in the home and community environment.     Patient's spiritual, cultural and educational needs considered and patient agreeable to plan of care and goals.     Anticipated barriers to physical therapy: severity of fibrosis and scarring    Goals:   The following goals were discussed with the patient and patient is in agreement with them as to be addressed in the treatment plan.      Short Term Goals: (6 weeks)   Goals: Progressing / MET Date Established Date Assessed   1. Patient will demonstrate decreased localized lymphedema to the jawline by  palpation and visualization of improved symmetry left and right  PROGRESSING 05-     2. Patient will demonstrate 100% knowledge of lymphedema precautions and signs of infection to allow for reduced lymphedema risk, infection risk, and/or exacerbation of condition.  PROGRESSING 05-     3. Patient or caregiver will perform self-bandaging techniques and/or wearing of compression garments to allow for lymphatic drainage support, skin elasticity,  and reduction in shape and size of limb.  PROGRESSING 05-     4. Patient will perform self lymph drainage techniques to areas within reach to enhance lymphatic drainage and skin condition.  PROGRESSING 05-     5. Patient will tolerate daily activities with external foam chips compression/ bandaging to allow for lymphatic and venous support.  PROGRESSING 05-        Long Term Goals: (12  weeks)   Goals: Progressing / MET Date Established Date Assessed   1. Patient will show decreased total girth measurement in sum of measurements to head and neck by up to 2 cm  to allow for improved comfort. PROGRESSING 05-     2. Patient will show reduction in density to mild/moderate or less with improved contour of limb to allow for cosmesis, symmetry, infection risk reduction, excursion of sift tissues for swallowing to clear secretions to protect the airway PROGRESSING 05-     3. Patient to malinda/doff compression garment with daily compliance to assist in lymphedema management, skin elasticity, and tissue density PROGRESSING 05-     4. Patient to show improved postural awareness and alignment. PROGRESSING 05-     5. Patient to be independent and compliant with home exercise program to allow for increased function in affected limb. PROGRESSING 05-         PLAN     Continue Physical Therapy  2x weekly for 6 weeks Complete Decongestive Therapy:  Manual lymphatic drainage, Multilayered short stretch bandaging, Pneumatic compression, Therapeutic exercises, and Patient education as deemed necessary to achieve stated goals.    Ena Mott, PT CLT   5/22/2023

## 2023-05-23 ENCOUNTER — TELEPHONE (OUTPATIENT)
Dept: HEMATOLOGY/ONCOLOGY | Facility: CLINIC | Age: 73
End: 2023-05-23

## 2023-05-23 NOTE — TELEPHONE ENCOUNTER
I spoke with pt wife, Janel, and reminded her to have pt get labs done prior to appt here on 5/31/23 she verbalized understanding.

## 2023-05-24 ENCOUNTER — CLINICAL SUPPORT (OUTPATIENT)
Dept: REHABILITATION | Facility: HOSPITAL | Age: 73
End: 2023-05-24
Payer: MEDICARE

## 2023-05-24 DIAGNOSIS — L90.5 SCAR CONDITIONS AND FIBROSIS OF SKIN: ICD-10-CM

## 2023-05-24 DIAGNOSIS — R29.898 DECREASED ROM OF NECK: ICD-10-CM

## 2023-05-24 DIAGNOSIS — I89.0 LYMPHEDEMA DUE TO RADIATION: Primary | ICD-10-CM

## 2023-05-24 PROCEDURE — 97140 MANUAL THERAPY 1/> REGIONS: CPT | Mod: PN

## 2023-05-24 NOTE — PROGRESS NOTES
[facility]   Physical Therapy Treatment Note   Lymphedema Upper Extremity    Name: Cyrus Batres Jr.  Clinic Number: [id number]    Therapy Diagnosis:   Encounter Diagnoses   Name Primary?    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    Scar conditions and fibrosis of skin     Decreased ROM of neck      Physician: Matheus Portillo Jr., MD    Visit Date: 5/24/2023    Physician Orders: PT Eval and Treat   Medical Diagnosis from Referral: Malignant neoplasm of base of tongue  - Primary   Evaluation Date: 5/8/2023  Authorization Period Expiration: 06-  Plan of Care Expiration: 08-  Progress Note Due: v 10  Visit # / Visits authorized: 4/ 10   FOTO: not completed       Time In: 1600  Time Out: 1640  Total Billable Time: 40 minutes    SUBJECTIVE     Nicolás reports: He did well after last appointment, noting increased salivation. He has noted improcved cervical spine active range of motion and decreased tissue tension to anterior neck.    Nicolás reported wearing compression outside of therapy visits: Yes - to anterior neck as instructed at initial evaluation  Nicolás was compliant with home exercise program.    Response to previous treatment: excellent  Functional change: improvement in skin quality and cervical spine range of motion     Pain: 0/10  Location: bilateral anterior neck      OBJECTIVE     Girth Measurements:     Girth Measurements (in centimeters)  LANDMARK Head/Neck Measurement Face  Right Face Left    Diagonal Head Circumference 55       Vertical Submental Circumference         Mandibular Angle to Mandibular Angle 27.5       Tragus Angle to Tragus Angle 32.0       Neck Superior 37.5       Neck Middle 34.0       Neck Inferior  34.5       Tragus to Mental Protuberance         Tragus to Mouth Angle         Mandibular Angle to Nasal Wing         Mandibular Angle to Internal Eye Corner         Mandibular Angle to External Eye Corner         Mental Protuberance to Internal Eye Corner          Mandibular Angle to Mental Protuberance         Totals         Total girth measurement-   220.5 cm       Total Girth Reduction                   Treatment     Nicolás received the treatments listed below:      Patient received from waiting room.     manual therapy techniques: Complete Decongestive Therapy was applied to the: head and neck for 40 minutes, including: manual lymphatic drainage and short stretch compression bandaging     MANUAL LYMPHATIC DRAINAGE:   Short neck  Prom with end range of motion prolonged passive stretch to anterior chest, shoulder depression  Grade 2-3 depression mobilizations to the first rib, right and left   Long neck  Anterior neck routed posterior to the radiation therapy zone and then to terminus  Fibrosis techniques to scar and fibrosis at anterior neck/radiation therapy zone - bulk of treatment  Mobilization,  cartilages in anterior neck and trachea  S deformation and elongation of SCM and anterior strap muscle  PPS to left and right SCM with overpressure  Redo long neck  Deep breathing  Grade 2-3 mobilizations to increase accessory mobility to left and right first rib depression, PA mobilizations T3-C2 - all x 10 repetitions ea    Reviewed HOME EXERCISE PROGRAM and self manual lymphatic drainage    Patient noted to have improved segmentation of cervical spine and head from torso with cervical rotation and extension.     therapeutic exercises to develop ROM, flexibility, and posture for 2 minutes including:   Active assisted range of motion -> resisted range of motion  cervical retraction for mobilization of anterior neck soft tissues.      Patient Education and Home Exercises     therapeutic activities to improve functional performance for 0  minutes, including:    Garments:  None - will consider [facility] spot for neck - information given 05-  Garments Ordered: No    Home Exercises and Patient Education Provided     Education provided:   - Educated in self massage  to abdominal areas, and cervical region  - Patient to leave short-stretch compression bandages intact for 12-24 hours as tolerated, discontinue with any problems, return rolled bandages next session. Wash and wear schedules confirmed.   - Continue HEP of AROM, stretching, and postural correction.   - Educated on self or assisted bandaging, compression options, and risk reduction    Written Home Exercises Provided: Patient instructed to cont prior HEP. Exercises were reviewed and Nicolás was able to demonstrate them prior to the end of the session.  Nicolás demonstrated good  understanding of the education provided. See EMR under Patient Instructions for exercises provided during therapy sessions    ASSESSMENT     Nicolás had (+) response to manual therapy. There was visible and palpable soft tissue extensibility to the anterior neck and jawline.  Hiis scars appeared to have decrease tension and puckering.    Nicolás Is progressing well towards his goals.   Pt prognosis is Good.     Patient will continue to benefit from skilled outpatient physical therapy to address the deficits listed in the problem list box on initial evaluation, provide patient/family education and to maximize patient's level of independence in the home and community environment.     Patient's spiritual, cultural and educational needs considered and patient agreeable to plan of care and goals.     Anticipated barriers to physical therapy: severity of fibrosis and scarring    Goals:   The following goals were discussed with the patient and patient is in agreement with them as to be addressed in the treatment plan.      Short Term Goals: (6 weeks)   Goals: Progressing / MET Date Established Date Assessed   1. Patient will demonstrate decreased localized lymphedema to the jawline by  palpation and visualization of improved symmetry left and right  PROGRESSING 05-     2. Patient will demonstrate 100% knowledge of lymphedema precautions and signs of  infection to allow for reduced lymphedema risk, infection risk, and/or exacerbation of condition.  PROGRESSING 05-     3. Patient or caregiver will perform self-bandaging techniques and/or wearing of compression garments to allow for lymphatic drainage support, skin elasticity, and reduction in shape and size of limb.  PROGRESSING 05-     4. Patient will perform self lymph drainage techniques to areas within reach to enhance lymphatic drainage and skin condition.  PROGRESSING 05-     5. Patient will tolerate daily activities with external foam chips compression/ bandaging to allow for lymphatic and venous support.  PROGRESSING 05-        Long Term Goals: (12  weeks)   Goals: Progressing / MET Date Established Date Assessed   1. Patient will show decreased total girth measurement in sum of measurements to head and neck by up to 2 cm  to allow for improved comfort. PROGRESSING 05-     2. Patient will show reduction in density to mild/moderate or less with improved contour of limb to allow for cosmesis, symmetry, infection risk reduction, excursion of sift tissues for swallowing to clear secretions to protect the airway PROGRESSING 05-     3. Patient to malinda/doff compression garment with daily compliance to assist in lymphedema management, skin elasticity, and tissue density PROGRESSING 05-     4. Patient to show improved postural awareness and alignment. PROGRESSING 05-     5. Patient to be independent and compliant with home exercise program to allow for increased function in affected limb. PROGRESSING 05-         PLAN     Continue Physical Therapy  2x weekly for 6 weeks Complete Decongestive Therapy:  Manual lymphatic drainage, Multilayered short stretch bandaging, Pneumatic compression, Therapeutic exercises, and Patient education as deemed necessary to achieve stated goals.    Ena Mott, PT CLT   5/24/2023

## 2023-05-26 ENCOUNTER — LAB VISIT (OUTPATIENT)
Dept: LAB | Facility: HOSPITAL | Age: 73
End: 2023-05-26
Attending: NURSE PRACTITIONER
Payer: MEDICARE

## 2023-05-26 DIAGNOSIS — C01 MALIGNANT NEOPLASM OF BASE OF TONGUE: ICD-10-CM

## 2023-05-26 LAB
ALBUMIN SERPL BCP-MCNC: 4.1 G/DL (ref 3.5–5.2)
ALP SERPL-CCNC: 253 U/L (ref 55–135)
ALT SERPL W/O P-5'-P-CCNC: 43 U/L (ref 10–44)
ANION GAP SERPL CALC-SCNC: 8 MMOL/L (ref 8–16)
AST SERPL-CCNC: 35 U/L (ref 10–40)
BASOPHILS # BLD AUTO: 0.01 K/UL (ref 0–0.2)
BASOPHILS NFR BLD: 0.3 % (ref 0–1.9)
BILIRUB SERPL-MCNC: 0.9 MG/DL (ref 0.1–1)
BUN SERPL-MCNC: 22 MG/DL (ref 8–23)
CALCIUM SERPL-MCNC: 8.8 MG/DL (ref 8.7–10.5)
CHLORIDE SERPL-SCNC: 104 MMOL/L (ref 95–110)
CO2 SERPL-SCNC: 28 MMOL/L (ref 23–29)
CREAT SERPL-MCNC: 1 MG/DL (ref 0.5–1.4)
DIFFERENTIAL METHOD: ABNORMAL
EOSINOPHIL # BLD AUTO: 0.2 K/UL (ref 0–0.5)
EOSINOPHIL NFR BLD: 4.5 % (ref 0–8)
ERYTHROCYTE [DISTWIDTH] IN BLOOD BY AUTOMATED COUNT: 12.1 % (ref 11.5–14.5)
EST. GFR  (NO RACE VARIABLE): >60 ML/MIN/1.73 M^2
GLUCOSE SERPL-MCNC: 113 MG/DL (ref 70–110)
HCT VFR BLD AUTO: 32.5 % (ref 40–54)
HGB BLD-MCNC: 10.9 G/DL (ref 14–18)
IMM GRANULOCYTES # BLD AUTO: 0.02 K/UL (ref 0–0.04)
IMM GRANULOCYTES NFR BLD AUTO: 0.5 % (ref 0–0.5)
LYMPHOCYTES # BLD AUTO: 0.7 K/UL (ref 1–4.8)
LYMPHOCYTES NFR BLD: 18.2 % (ref 18–48)
MCH RBC QN AUTO: 32.6 PG (ref 27–31)
MCHC RBC AUTO-ENTMCNC: 33.5 G/DL (ref 32–36)
MCV RBC AUTO: 97 FL (ref 82–98)
MONOCYTES # BLD AUTO: 0.3 K/UL (ref 0.3–1)
MONOCYTES NFR BLD: 8.2 % (ref 4–15)
NEUTROPHILS # BLD AUTO: 2.6 K/UL (ref 1.8–7.7)
NEUTROPHILS NFR BLD: 68.3 % (ref 38–73)
NRBC BLD-RTO: 0 /100 WBC
PLATELET # BLD AUTO: 158 K/UL (ref 150–450)
PMV BLD AUTO: 10.7 FL (ref 9.2–12.9)
POTASSIUM SERPL-SCNC: 3.9 MMOL/L (ref 3.5–5.1)
PROT SERPL-MCNC: 7.4 G/DL (ref 6–8.4)
RBC # BLD AUTO: 3.34 M/UL (ref 4.6–6.2)
SODIUM SERPL-SCNC: 140 MMOL/L (ref 136–145)
WBC # BLD AUTO: 3.8 K/UL (ref 3.9–12.7)

## 2023-05-26 PROCEDURE — 85025 COMPLETE CBC W/AUTO DIFF WBC: CPT | Performed by: NURSE PRACTITIONER

## 2023-05-26 PROCEDURE — 80053 COMPREHEN METABOLIC PANEL: CPT | Performed by: NURSE PRACTITIONER

## 2023-05-26 PROCEDURE — 36415 COLL VENOUS BLD VENIPUNCTURE: CPT | Performed by: NURSE PRACTITIONER

## 2023-05-29 ENCOUNTER — CLINICAL SUPPORT (OUTPATIENT)
Dept: REHABILITATION | Facility: HOSPITAL | Age: 73
End: 2023-05-29
Payer: MEDICARE

## 2023-05-29 DIAGNOSIS — R29.898 DECREASED ROM OF NECK: ICD-10-CM

## 2023-05-29 DIAGNOSIS — I89.0 LYMPHEDEMA DUE TO RADIATION: Primary | ICD-10-CM

## 2023-05-29 DIAGNOSIS — L90.5 SCAR CONDITIONS AND FIBROSIS OF SKIN: ICD-10-CM

## 2023-05-29 PROCEDURE — 97140 MANUAL THERAPY 1/> REGIONS: CPT | Mod: PN

## 2023-05-29 NOTE — PROGRESS NOTES
[facility]   Physical Therapy Treatment Note   Lymphedema Upper Extremity    Name: Cyrus Batres Jr.  Clinic Number: [id number]    Therapy Diagnosis:   Encounter Diagnoses   Name Primary?    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    Scar conditions and fibrosis of skin     Decreased ROM of neck      Physician: Matheus Portillo Jr., MD    Visit Date: 5/29/2023    Physician Orders: PT Eval and Treat   Medical Diagnosis from Referral: Malignant neoplasm of base of tongue  - Primary   Evaluation Date: 5/8/2023  Authorization Period Expiration: 06-  Plan of Care Expiration: 08-  Progress Note Due: v 10  Visit # / Visits authorized: 5/ 10   FOTO: not completed       Time In: 1500  Time Out: 1545  Total Billable Time: 45 minutes    SUBJECTIVE     Nicolás reports: He did well after last appointment, noting continued increased salivation. He reports that he had rebound fluid load to submental area since last treatment.  He has ordered and received the [employer] swell spot and has been using it on his neck.    Nicolás reported wearing compression outside of therapy visits: Yes - to anterior neck as instructed at initial evaluation  Nicolás was compliant with home exercise program.    Response to previous treatment: excellent  Functional change: improvement in skin quality and cervical spine range of motion     Pain: 0/10  Location: bilateral anterior neck      OBJECTIVE     Girth Measurements:     Girth Measurements (in centimeters)  LANDMARK Head/Neck Measurement Face  Right Face Left    Diagonal Head Circumference 55       Vertical Submental Circumference         Mandibular Angle to Mandibular Angle 27.5       Tragus Angle to Tragus Angle 32.0       Neck Superior 37.5       Neck Middle 34.0       Neck Inferior  34.5       Tragus to Mental Protuberance         Tragus to Mouth Angle         Mandibular Angle to Nasal Wing         Mandibular Angle to Internal Eye Corner         Mandibular Angle to  External Eye Corner         Mental Protuberance to Internal Eye Corner         Mandibular Angle to Mental Protuberance         Totals         Total girth measurement-   220.5 cm       Total Girth Reduction                   Treatment     Nicolás received the treatments listed below:      Patient received from waiting room.     manual therapy techniques: Complete Decongestive Therapy was applied to the: head and neck for 45 minutes, including: manual lymphatic drainage and short stretch compression bandaging     MANUAL LYMPHATIC DRAINAGE:   Short neck  Prom with end range of motion prolonged passive stretch to anterior chest, shoulder depression  Grade 2-3 depression mobilizations to the first rib, right and left   Long neck  Anterior neck routed posterior to the radiation therapy zone and then to terminus  Fibrosis techniques to scar and fibrosis at anterior neck/radiation therapy zone - bulk of treatment  Mobilization,  cartilages in anterior neck and trachea  S deformation and elongation of SCM and anterior strap muscle  PPS to left and right SCM with overpressure  Redo long neck  Deep breathing  Grade 2-3 mobilizations to increase accessory mobility to left and right first rib depression, PA mobilizations T3-C2 - all x 10 repetitions ea      Kinesiotaping to submental and anterior neck, above trach, using open herlinda pattern.  Reviewed wear schedule and precautions with taping.  He was instructed to remove tomorrow and cleanse his skin prior to the PET scan.      Reviewed HOME EXERCISE PROGRAM and self manual lymphatic drainage    Patient noted to have improved segmentation of cervical spine and head from torso with cervical rotation and extension.     therapeutic exercises to develop ROM, flexibility, and posture for 2 minutes including:   Active assisted range of motion -> resisted range of motion  cervical retraction for mobilization of anterior neck soft tissues.      Patient Education and Home Exercises      therapeutic activities to improve functional performance for 0  minutes, including:    Garments:  None - will consider [employer] spot for neck - information given 05-  Garments Ordered: No    Home Exercises and Patient Education Provided     Education provided:   - Educated in self massage to abdominal areas, and cervical region  - Patient to leave short-stretch compression bandages intact for 12-24 hours as tolerated, discontinue with any problems, return rolled bandages next session. Wash and wear schedules confirmed.   - Continue HEP of AROM, stretching, and postural correction.   - Educated on self or assisted bandaging, compression options, and risk reduction    Written Home Exercises Provided: Patient instructed to cont prior HEP. Exercises were reviewed and Nicolás was able to demonstrate them prior to the end of the session.  Nicolás demonstrated good  understanding of the education provided. See EMR under Patient Instructions for exercises provided during therapy sessions    ASSESSMENT     Nicolás had (+) response to manual therapy during treatment, yet he demonstrated rebound in his lymphedema. He may benefit from more dense foam chips padding under his chin to address the fibrosis.  Will consider instruction in self taping if he shows (+) benefit at next visit.     Nicolás Is progressing well towards his goals.   Pt prognosis is Good.     Patient will continue to benefit from skilled outpatient physical therapy to address the deficits listed in the problem list box on initial evaluation, provide patient/family education and to maximize patient's level of independence in the home and community environment.     Patient's spiritual, cultural and educational needs considered and patient agreeable to plan of care and goals.     Anticipated barriers to physical therapy: severity of fibrosis and scarring    Goals:   The following goals were discussed with the patient and patient is in agreement with  them as to be addressed in the treatment plan.      Short Term Goals: (6 weeks)   Goals: Progressing / MET Date Established Date Assessed   1. Patient will demonstrate decreased localized lymphedema to the jawline by  palpation and visualization of improved symmetry left and right  PROGRESSING 05-     2. Patient will demonstrate 100% knowledge of lymphedema precautions and signs of infection to allow for reduced lymphedema risk, infection risk, and/or exacerbation of condition.  PROGRESSING 05-     3. Patient or caregiver will perform self-bandaging techniques and/or wearing of compression garments to allow for lymphatic drainage support, skin elasticity, and reduction in shape and size of limb.  PROGRESSING 05-  MET 05-   4. Patient will perform self lymph drainage techniques to areas within reach to enhance lymphatic drainage and skin condition.  PROGRESSING 05-  MET 05-   5. Patient will tolerate daily activities with external foam chips compression/ bandaging to allow for lymphatic and venous support.  PROGRESSING 05-  MET 05-      Long Term Goals: (12  weeks)   Goals: Progressing / MET Date Established Date Assessed   1. Patient will show decreased total girth measurement in sum of measurements to head and neck by up to 2 cm  to allow for improved comfort. PROGRESSING 05-     2. Patient will show reduction in density to mild/moderate or less with improved contour of limb to allow for cosmesis, symmetry, infection risk reduction, excursion of sift tissues for swallowing to clear secretions to protect the airway PROGRESSING 05-     3. Patient to malinda/doff compression garment with daily compliance to assist in lymphedema management, skin elasticity, and tissue density PROGRESSING 05-  MET 05-   4. Patient to show improved postural awareness and alignment. PROGRESSING 05-     5. Patient to be independent and compliant with  home exercise program to allow for increased function in affected limb. PROGRESSING 05-         PLAN     Continue Physical Therapy  2x weekly for 6 weeks Complete Decongestive Therapy:  Manual lymphatic drainage, Multilayered short stretch bandaging, Pneumatic compression, Therapeutic exercises, and Patient education as deemed necessary to achieve stated goals.    Ena Mott, PT CLT   5/29/2023

## 2023-05-30 ENCOUNTER — HOSPITAL ENCOUNTER (OUTPATIENT)
Dept: RADIOLOGY | Facility: HOSPITAL | Age: 73
Discharge: HOME | End: 2023-05-30
Attending: RADIOLOGY
Payer: MEDICARE

## 2023-05-30 VITALS — BODY MASS INDEX: 20.89 KG/M2 | WEIGHT: 130 LBS | HEIGHT: 66 IN

## 2023-05-30 DIAGNOSIS — C01 MALIGNANT NEOPLASM OF BASE OF TONGUE: ICD-10-CM

## 2023-05-30 LAB — GLUCOSE SERPL-MCNC: 113 MG/DL (ref 70–110)

## 2023-05-30 PROCEDURE — 78815 PET IMAGE W/CT SKULL-THIGH: CPT | Mod: TC,PO

## 2023-05-30 PROCEDURE — A9552 F18 FDG: HCPCS | Mod: PO

## 2023-05-30 NOTE — PROGRESS NOTES
[facility] Hematology/Oncology  PROGRESS NOTE -  Follow-up Visit      Subjective:       Patient ID:   NAME: Cyrus Batres Jr. : 1950     72 y.o. 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  The patient is here today to go over the results of the recently ordered labs, tests and studies. He is here by himself.    He has since completed chemotherapy and  XRT.  He seems to have tolerated the regimen fairly well . He saw Dr James with ENT on 2023 and had repeat scope with good report per patient.    He had recent PET yesterday on  which overall looks adequate    He is doing well and is active at home.            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 He had his portacath removed on  and replaced on 2023 by Dr Le    He is breathing ok. No HA's or CP; no pain at this time        Discussed covid19 and he has been vaccinated      ROS:   GEN: normal without any fever, night sweats or weight loss; doing well with tube feeds   HEENT: normal with no HA's, sore throat, stiff neck, changes in vision  CV: normal with no CP, SOB, PND, no currentDOE  PULM: normal with no SOB, cough, hemoptysis, sputum or pleuritic pain  GI: no current N/V  ; has peg tube;    : normal with no hematuria, dysuria  BREAST: normal with no mass, discharge, pain  SKIN: normal with no rash,  erythema, bruising, or swelling     Pain Scale:  0    Allergies:  Review of patient's allergies indicates:   Allergen Reactions    Lovastatin Itching    Pollen extracts     Lovastatin Rash     Not confirmed but pt skeptical       Medications:    Current Outpatient Medications:     acetaminophen (TYLENOL) 325 MG tablet, Take 325 mg by mouth every 6 (six) hours as needed for Pain., Disp: , Rfl:     calcitrioL (ROCALTROL) 1 mcg/mL solution, Take 0.25 mLs (0.25 mcg total) by Per G Tube route once daily., Disp: 15 mL, Rfl: 12    calcium carbonate 500 mg/5 mL (1,250 mg/5 mL), 10 mLs (1,000 mg total) by Per G Tube route 4 (four) times daily with meals and nightly., Disp: 473 mL, Rfl: 12    esomeprazole (NEXIUM) 40 MG capsule, 40 mg before breakfast., Disp: , Rfl:     famotidine (PEPCID) 40 mg/5 mL (8 mg/mL) suspension, Give 2.5 mLs (20 mg total) by Per G Tube route 2 (two) times daily., Disp: 50 mL, Rfl: 11    ibuprofen (ADVIL,MOTRIN) 200 MG tablet, Take 200 mg by mouth every 6 (six) hours as needed for Pain., Disp: , Rfl:     lactose-reduced food with fibr (JEVITY 1.5 TRISHA) 0.06 gram-1.5 kcal/mL Liqd, 6 cartons per day via peg.  Flush 60ml before and after each bolus, Disp: 180 each, Rfl: 11    levothyroxine (SYNTHROID) 100 MCG tablet, 1 tablet (100 mcg total) by Per G Tube route before breakfast., Disp: 30 tablet, Rfl: 11    promethazine (PHENERGAN) 25 MG tablet, Take 1 tablet (25 mg total) by mouth every 4 to 6 hours as needed for Nausea., Disp: 30 tablet, Rfl: 2    calcium carbonate (TUMS) 200 mg calcium (500 mg) chewable tablet, 2 tablets (1,000 mg total) by Per G Tube route 5 (five) times daily. (Patient not taking: Reported on 5/2/2023), Disp: 300 tablet, Rfl: 11    losartan (COZAAR) 100 MG tablet, Take 0.5 tablets (50 mg total) by mouth once daily. (Patient not taking: Reported on 5/31/2023), Disp: 45 tablet, Rfl: 3    metFORMIN (GLUCOPHAGE) 500 MG tablet, Take 1 tablet (500 mg total) by mouth 2 (two) times daily  "with meals. (Patient not taking: Reported on 5/31/2023), Disp: 60 tablet, Rfl: 3    traZODone (DESYREL) 50 MG tablet, TAKE 1 TABLET BY MOUTH NIGHTLY AS NEEDED FOR INSOMNIA. (Patient not taking: Reported on 5/31/2023), Disp: 90 tablet, Rfl: 1    zolpidem (AMBIEN) 5 MG Tab, 1 tablet (5 mg total) by Per G Tube route nightly as needed (difficult with sleep)., Disp: 30 tablet, Rfl: 0    PMHx/PSHx Updates:  See patient's last visit with me on 3/6/2023  See H&P on 9/23/2022        Pathology:   Cancer Staging   Larynx cancer  Staging form: Larynx - Glottis, AJCC 8th Edition  - Clinical stage from 8/6/2020: Stage II (cT2, cN0, cM0) - Signed by Fariha Muñoz NP on 8/6/2020  - Pathologic stage from 1/20/2022: Stage LOU (pT4a, pN0, cM0) - Signed by Fariha Muñoz NP on 1/20/2022  Staging form: Pharynx - P16 Negative Oropharynx, AJCC 8th Edition  - Clinical: Stage II (rcT2, cN0, cM0) - Signed by Matheus Portillo Jr., MD on 5/30/2022    Malignant neoplasm of base of tongue  Staging form: Pharynx - P16 Negative Oropharynx, AJCC 8th Edition  - Clinical stage from 5/20/2022: Stage II (cT2, cN0, cM0, p16-) - Signed by Saúl Kessler MD on 7/7/2022    Dissection 11/9/2022:    Final Pathologic Diagnosis 1. "left submandibular lymph node", dissection:       - Three lymph nodes, negative for carcinoma (0/3)   2. 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       - Tumor size: 4.5 cm       - Resection margins: left superior pharyngeal margin focally involved;   all other margins are negative for carcinoma       - Left internal jugular vein: free of tumor       - One lymph node, negative for carcinoma (0/1)   Note: P16 immunostain is negative     Tumor Site       Oropharynx  involving Base of tongue       Tumor Laterality       Midline       Tumor Size       Greatest Dimension (Centimeters) 4.5 cm         HPV-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) " squamous cell carcinoma (oropharynx)       Histologic Grade       G2 to G3: Moderate to Poorly differentiated       Lymphovascular Invasion       Not identified       Perineural Invasion       Not identified     MARGINS      Margins       Involved by invasive tumor     Margin(s)   Involved by Invasive Tumor:      left superior pharyngeal margin; all other   margins are free of tumor     LYMPH NODES    Regional Lymph Node Status:    :     Regional Lymph Node   Status:      All regional lymph nodes negative for tumor      pT Category:               pT3   pN Category:               pN0      Base of Tongue Biopsy  4/27/2022:  Final Pathologic Diagnosis BIOPSY OF BASE OF THE TONGUE:   THE INFILTRATING POORLY DIFFERENTIATED SQUAMOUS CELL CARCINOMA   THE TUMOR IS P16 NEGATIVE.  THE POSITIVE AND NEGATIVE CONTROLS STAINED   APPROPRIATELY      Larynx resection/thyroidectomy 1/6/2022:     Final Pathologic Diagnosis 1. Lymph nodes, left neck levels 2,  3 and 4, dissection:   - Nineteen lymph nodes, all  negative  for metastatic carcinoma (0/19)   2. Lymph nodes, right neck levels 2,  3 and 4, dissection:   - Twenty-eight lymph nodes, all  negative  for metastatic carcinoma (0/28)   3. Possible parathyroid gland, excision:   - Benign parathyroid gland tissue (0.003 g)   4. 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 Soft tissue, posterior tracheal margin, excision:   - Benign, focally reactive respiratory mucosa with submucosal acute   inflammation,  negative  for  "dysplasia or malignancy   6. Soft tissue, anterior tracheal margin, excision:   - Benign respiratory mucosa with subepithelial cartilage,  negative  for   dysplasia or malignancy   7. Soft tissue, posterior tracheal margin #2, excision:   - Benign, focally reactive respiratory mucosa with submucosal acute   inflammation,  negative  for dysplasia or malignancy   8. Soft tissue, anterior tracheal margin #2, excision:   - Benign, reactive focally ulcerated respiratory mucosa with submucosal acute   inflammation,  negative  for dysplasia or malignancy             Laryngoscope 8/4/2020: (with Dr Noel):  Final Pathologic Diagnosis 1.  Left true vocal fold, biopsy:       -  Invasive moderately differentiated squamous cell carcinoma,   keratinizing type   2.  Left true vocal fold, biopsy:       -  Invasive moderately differentiated squamous cell carcinoma,   keratinizing type             Laryngoscope 3/17/2020 (with Dr Xiao):  Final Pathologic Diagnosis 1.  BIOPSY OF LEFT TRUE VOCAL CORD:   SEVERELY DYSPLASTIC APPEARING SQUAMOUS MUCOSA   INVASIVE CARCINOMA IS NOT DOCUMENTED   ON THE OTHER HAND, THESE FRAGMENTS ARE NODUL AND WITHOUT SUBMUCOSA FOR   EVALUATION; IT IS POSSIBLE THAT THEY REFLECT INVASIVE SQUAMOUS CARCINOMA   2.  BIOPSY OF LEFT ANTERIOR COMMISSURE:   MODERATE DYSPLASIA   NO INVASIVE CARCINOMA IDENTIFIED             Objective:     Vitals:  Blood pressure (!) 121/59, pulse 75, temperature 99.6 °F (37.6 °C), resp. rate 16, height 5' 6" (1.676 m), weight 60.4 kg (133 lb 1.6 oz).    Physical Examination:   GEN: no apparent distress, comfortable; AAOx3  HEAD: atraumatic and normocephalic  EYES: no pallor, no icterus, PERRLA  ENT: OMM, no pharyngeal erythema, external ears WNL; no nasal discharge; no thrush; s/p laryngectomy with flap since healed well; trach   NECK: no masses, thyroid normal, trachea midline, no LAD/LN's, supple; post-op dissection healed well;    CV: RRR with no murmur; normal pulse; normal S1 and " S2; no pedal edema; portacath   CHEST: Normal respiratory effort; CTAB; normal breath sounds; no wheeze or crackles  ABDOM: nontender and nondistended; soft; normal bowel sounds; no rebound/guarding; peg tube  MUSC/Skeletal: ROM normal; no crepitus; joints normal; no deformities or arthropathy  EXTREM: no clubbing, cyanosis, inflammation or swelling  SKIN: no rashes, lesions, ulcers, petechiae or subcutaneous nodules  : no mahan  NEURO: grossly intact; motor/sensory WNL; AAOx3; no tremors  PSYCH: normal mood, affect and behavior  LYMPH: normal cervical, supraclavicular, axillary and groin LN's          Labs:     Lab Results   Component Value Date    WBC 3.80 (L) 05/26/2023    HGB 10.9 (L) 05/26/2023    HCT 32.5 (L) 05/26/2023    MCV 97 05/26/2023     05/26/2023     CMP  Sodium   Date Value Ref Range Status   05/26/2023 140 136 - 145 mmol/L Final     Potassium   Date Value Ref Range Status   05/26/2023 3.9 3.5 - 5.1 mmol/L Final     Chloride   Date Value Ref Range Status   05/26/2023 104 95 - 110 mmol/L Final     CO2   Date Value Ref Range Status   05/26/2023 28 23 - 29 mmol/L Final     Glucose   Date Value Ref Range Status   05/26/2023 113 (H) 70 - 110 mg/dL Final     BUN   Date Value Ref Range Status   05/26/2023 22 8 - 23 mg/dL Final     Creatinine   Date Value Ref Range Status   05/26/2023 1.0 0.5 - 1.4 mg/dL Final     Calcium   Date Value Ref Range Status   05/26/2023 8.8 8.7 - 10.5 mg/dL Final     Total Protein   Date Value Ref Range Status   05/26/2023 7.4 6.0 - 8.4 g/dL Final     Albumin   Date Value Ref Range Status   05/26/2023 4.1 3.5 - 5.2 g/dL Final   11/18/2020 3.7 3.6 - 5.1 g/dL Final     Comment:     For additional information, please refer to   http://education.Jianshu.Ajungo/faq/WPV843 (This link is   being provided for informational/ educational purposes only.)  This test was developed and its analytical performance   characteristics have been determined by [facility]. It has not been cleared or approved by the   [place] Food and Drug Administration. This assay has been validated   pursuant to the CLIA regulations and is used for clinical   purposes.  @ Test Performed By:  [facility]  Yo Cortes M.D.,   [address] [place] [address]  CLIA  [id number]       Total Bilirubin   Date Value Ref Range Status   05/26/2023 0.9 0.1 - 1.0 mg/dL Final     Comment:     For infants and newborns, interpretation of results should be based  on gestational age, weight and in agreement with clinical  observations.    Premature Infant recommended reference ranges:  Up to 24 hours.............<8.0 mg/dL  Up to 48 hours............<12.0 mg/dL  3-5 days..................<15.0 mg/dL  6-29 days.................<15.0 mg/dL       Alkaline Phosphatase   Date Value Ref Range Status   05/26/2023 253 (H) 55 - 135 U/L Final     AST   Date Value Ref Range Status   05/26/2023 35 10 - 40 U/L Final     ALT   Date Value Ref Range Status   05/26/2023 43 10 - 44 U/L Final     Anion Gap   Date Value Ref Range Status   05/26/2023 8 8 - 16 mmol/L Final     eGFR if    Date Value Ref Range Status   07/29/2022 >60.0 >60 mL/min/1.73 m^2 Final     eGFR if non    Date Value Ref Range Status   07/29/2022 >60.0 >60 mL/min/1.73 m^2 Final     Comment:     Calculation used to obtain the estimated glomerular filtration  rate (eGFR) is the CKD-EPI equation.          Lab Results   Component Value Date    IRON 30 (L) 11/25/2022    TRANSFERRIN 114 (L) 11/25/2022    TIBC 169 (L) 11/25/2022    FESATURATED 18 (L) 11/25/2022            Radiology/Diagnostic Studies:      PET 5/30/2023:    IMPRESSION: Postsurgical changes from total glossectomy, laryngectomy and pharyngectomy with bilateral neck dissections     There is resolution of the previously noted fluid collections within the neck. There is mild FDG activity in the  left side the neck adjacent to the neoesophagus as well as at the tracheostomy site to the right of the neoesophagus. Findings most likely are secondary to postsurgical and postinfection changes. There is no focal mass or adenopathy     No evidence of distant metastasis             CTA Neck    Result Date: 9/20/2022  EXAMINATION: CTA NECK CLINICAL HISTORY: Head/neck cancer, monitor;Malignant neoplasm of base of tongue TECHNIQUE: CT angiogram was performed from the level of the scott to the EAC following the IV administration of 75mL of Omnipaque 350.   Sagittal and coronal reconstructions and maximum intensity projection reconstructions were performed. Arterial stenosis percentages are based on NASCET measurement criteria. COMPARISON: CTA neck dated 05/20/2022 FINDINGS: Aortic arch and great vessels: There is a conventional left-sided 3 vessel arch.  The aortic arch is widely patent.  There is stable soft and calcified plaque in the proximal left subclavian artery with a similar appearance the prior study and possibly within radiation field but without critical stenosis or occlusion.  The right subclavian artery is patent.  The brachiocephalic trunk and origin of the left common carotid artery are patent. Carotid arteries Right carotid artery: There is calcified plaque scattered in the right common carotid artery without critical stenosis, occlusion or change.  There is no measurable stenosis of the internal carotid artery which is patent to the skull base. Left carotid artery: There is mild plaque scattered in the left common carotid artery without change and without critical stenosis or occlusion.  There is no measurable stenosis of the internal carotid artery. Vertebral arteries: The left vertebral artery is dominant and patent throughout its course in the neck.  The right vertebral artery is hypoplastic but patent.  There is no critical stenosis, occlusion, thrombus or dissection.  There is no change. The study  extends intracranially.  There is calcified plaque in the V4 segment of the left vertebral artery resulting in a moderate to marked short segment nonocclusive stenosis.  The right vertebral artery partially terminates in a posteroinferior cerebellar artery with a short segment moderate to marked stenosis of the very distal right vertebral artery.  Some flow within the distal right vertebral artery may represent retrograde flow from the dominant left vertebral artery.  The basilar artery is patent.  The origins of the superior cerebellar and posterior cerebral artery on the right are patent.  There is fetal origin of the left posterior cerebral artery which is patent. There is plaque in the cavernous segments of both internal carotid arteries but there is no critical stenosis or large vessel occlusion of the anterior circulation vessels.  There is no large aneurysm. Other: There is a similar appearance of the included upper lungs with pleural and parenchymal changes anteriorly in the upper lobes bilaterally.  As previously discussed, timing of the contrast bolus is performed during the arterial phase for CTA neck.  Therefore, enhancement of the soft tissues is somewhat suboptimal due to this technique. There has been a large region of soft tissue resection in the anterior mid and lower neck soft tissues with continued open defect in the upper chest and lower neck above the manubrium.  Soft tissue seen along the left posterolateral border of the trachea could represent secretions or mucus.  This was present previously. Redemonstrated is a large heterogeneously enhancing lesion centered at the level of the tongue base and floor of the mouth centrally into the left.  There is scattered calcifications.  The prior CTA demonstrated regions of central necrosis which are no longer definitely present but diffusely heterogeneous density and enhancement likely represents regions of necrosis.  This large heterogeneously  enhancing soft tissue mass extends more anteriorly in the floor of the mouth on the left and measures at least 5.6 cm in greatest anterior to posterior dimension with 3.6 cm transverse dimension.  This does extend anteriorly to the medial margin of the body of the mandible on the left. There are post treatment changes with stranding in the neck fat.     1. Similar appearance of the neck vessels when compared to the prior CTA neck with mild narrowing at the origin of the left subclavian artery.  There is no critical stenosis, occlusion, thrombosis or dissection involving the cervical vertebral or carotid arteries. 2. Larger mass within the hypopharynx and tongue base extending anteriorly to the floor of the mouth on the left to the medial margin of the body of the mandible without obvious bony destruction. 3. There is nonocclusive stenosis of the distal V4 segment of the left vertebral artery without change. 4. There is no large vessel occlusion or critical stenosis of the anterior circulation. 5. There is developmental variation with fetal origin of the left posterior cerebral artery. Electronically signed by: Rxe Joyner MD Date:    09/20/2022 Time:    11:31    CT Abdomen Pelvis With Contrast    Result Date: 9/1/2022  CMS MANDATED QUALITY DATA - CT RADIATION - 436 All CT scans at this facility utilize dose modulation, iterative reconstruction, and/or weight based dosing when appropriate to reduce radiation dose to as low as reasonably achievable. Reason: abdominal pain partial history of laryngeal carcinoma TECHNIQUE: CT abdomen and pelvis with 100 mL Omnipaque 350. COMPARISON: PET/CT 5/13/2022 CT ABDOMEN: Coronary artery calcification and extremely tiny hiatal hernia incidentally noted. Visualized lung bases are clear. Liver, gallbladder, pancreas, spleen, adrenals, and kidneys are normal. Mild aortoiliac calcifications present. Gastrostomy tube tip lies in distal gastric body. Small intestines are  unremarkable. Mild wall thickening affects the ascending colon with pneumatosis intestinalis diffusely affecting the ascending colon. No other free intraperitoneal gas or, and remainder of colon is normal. A normal appendix is present. The major mesenteric vascular structures are patent. No acute osseous abnormality. CT PELVIS: Prostate is slightly enlarged. Bladder is normal. No free pelvic fluid. Tiny right and small to moderate left fat-containing inguinal hernias are present. No acute osseous abnormality. IMPRESSION: 1. Pneumatosis intestinalis affecting the ascending colon with associated mild wall thickening. Etiology of this is undetermined. Potential considerations include intestinal ischemia or pneumatosis related to chemotherapy. Clinical and laboratory correlation is needed to assess significance. No portal venous gas or free intraperitoneal air however. 2. Coronary artery calcifications Electronically signed by:  Nael Hui MD  9/1/2022 6:20 PM CDT Workstation: [id number]    NM PET CT Routine Skull to Mid Thigh    Result Date: 9/27/2022  PET CT WITH IMAGE FUSION HISTORY:  restage tongue cancer RESTAGING...  HX: TONGUE CA.  DX: April 2022.  LAST CHEMO TREATMENT WAS 3WKS AGO.  EVALUATE TX RESPONSE.   ALSO HX OF LARYNGEAL CA IN AUGUST 2020.  MULTIPLE SURGERIES: LARYNGECTOMY, THYROIDECTOMY & TRACHEOSTOMY.  RAD TX WAS COMPLETED IN NOV 2020. TECHNIQUE: Following IV administration of 11.2 mCi of F-18 labeled FDG into right antecubital fossa and a 60 minute delay, PET CT was performed from the vertex of the skull through the proximal thighs with an integrated PET CT scanner with image fusion. CT images were obtained to aid in attenuation correction and PET localization. The patient's serum glucose at the time of the exam was 136 mg/dL. COMPARISON: PET/CT 5/13/2022 FINDINGS: Poorly characterized soft tissue mass involving base of tongue approximately measures 4.3 x 2.8 cm (series 3 image 65) appearing similar  to the prior exam. This reaches current max SUV of 11.8, not significantly changed from prior of 11.4. No other abnormal FDG activity. Intracranial compartment is unremarkable. Trace bilateral maxillary sinus mucosal thickening is present. Postoperative changes of laryngectomy and tracheostomy are present. Surgical clips occur throughout the neck. No definite enlarged cervical or supraclavicular lymph node, with evaluation limited by lack of IV contrast. Left subclavian port catheter tip terminates in SVC. Diffuse coronary artery calcifications are present. No enlarged mediastinal or axillary lymph nodes. No pulmonary nodule or mass. Gastrostomy tube tip lies in gastric body. Moderate aortoiliac calcifications are present. Small fat-containing left inguinal hernia incidentally noted. Mild degenerative changes affect the spine. No suspicious osseous abnormality. IMPRESSION: 1. No significant change in the FDG avid mass involving the tongue base, remaining characteristic of malignancy. 2. No new FDG avid malignancy or metastatic disease. Electronically signed by:  Nael Hui MD  9/27/2022 2:38 PM CDT Workstation: [id number]    CT Abdomen Pelvis  Without Contrast    Result Date: 9/4/2022  CMS MANDATED QUALITY DATA - CT RADIATION  436 All CT scans at this facility utilize dose modulation, iterative reconstruction, and/or weight based dosing when appropriate to reduce radiation dose to as low as reasonably achievable. CT ABDOMEN PELVIS WITHOUT IV CONTRAST CLINICAL HISTORY: 72 years Male Follow-up pneumatosis COMPARISON: CT abdomen and pelvis September 1, 2022 FINDINGS: Imaging through the lower thorax demonstrates subsegmental atelectasis of the dependent lower lobes. Coronary artery calcification. Bone window images show no acute or aggressive osseous abnormality. Transitional lumbosacral vertebral body. On this unenhanced exam, no focal hepatic lesion. Gallbladder and biliary tree are unremarkable. Spleen appears  normal. Pancreas is unremarkable. No adrenal lesion. No renal calculi or hydronephrosis. Ureters are normal in caliber. Urinary bladder is within normal limits. Gastrostomy tube is in place within the stomach. Stomach is largely collapsed. No evidence of small bowel obstruction. Pneumatosis and pericolonic abscess involving the ascending colon is redemonstrated, slightly diminished compared to prior. Mild wall thickening throughout the colon. No free fluid or free air within the abdomen or pelvis. Aortoiliac atherosclerotic calcification. No pathologically enlarged lymph nodes within the abdomen or pelvis. Bilateral fat-containing inguinal hernias, larger on the left. IMPRESSION: Pneumatosis and pericolonic gas about the ascending colon has decreased in volume compared to prior. Persistent colonic wall thickening which could indicate colitis. Coronary and aortic atherosclerosis. Gastrostomy tube is within the stomach. Bilateral inguinal hernias. Electronically signed by:  Jose De Jesus Oleary MD  9/4/2022 4:06 PM CDT Workstation: [id number]    CTA neck  5/20/2022:     IMPRESSION:  Persistent mass within the hypopharynx consistent with malignancy.  By my measurements it is larger than on April 24, 2022 with central necrosis.  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 This is similar to the previous April 2022 study.  No evidence of arterial compromise related to malignancy.     PET 5/13/2022:     IMPRESSION:  1. Postoperative changes of prior laryngectomy and lymph node resection/radiation.  2. Masslike FDG avid lesion to the left of midline at the tongue base concerning for residual/recurrent disease.  3. Adjacent confluent nodular extension along the inferior left side of the mass.  4. No FDG avid lymphadenopathy or convincing additional sites of disease in the chest, abdomen or pelvis.     CT head 5/10/2022:  FINDINGS: Comparison to multiple prior exams.  There is no acute intracranial hemorrhage, with no mass effect or abnormal extra-axial fluid. Mild scattered areas of nonspecific hypoattenuation involve the deep periventricular white matter, with gray-white differentiation maintained.     There is mild generalized prominence of the cortical sulci and ventricles. The cerebellum and brainstem are unremarkable. There are carotid siphon vascular calcifications. The visualized paranasal sinuses and mastoid air cells are clear. There is no acute calvarial fracture or scalp hematoma.     IMPRESSION: No acute intracranial hemorrhage or acute calvarial fracture     CT soft neck 4/24/2022;     Oral tongue/tongue base:  At the base of the tongue on the left there are findings of a heterogeneously enhancing mass measuring 2.1 cm highly concerning for a neoplastic process.     True and false cords:  Patient status post laryngectomy which has been performed in the interim. Soft tissue stranding in the lower neck likely related to a previous flat reconstruction. No enhancement or lymphadenopathy within the lower neck.     Lymph node assessment:Negative     Surrounding soft tissues:  Negative     Vasculature:  Negative     Osseous structures:  Negative     Lung apices:  Scarring involving the lung apices bilaterally likely related to previous radiation.     IMPRESSION:  1. Postoperative and radiation changes involving the lower neck.  2. Findings highly concerning for a developing mass at the left base of the tongue enhancing 2.1 cm region. Direct visualization in this region would be of benefit.        CT soft neck  12/24/2021  IMPRESSION:  1.  Laryngeal mass as on prior exam. Laryngeal airway narrowing has not changed.  2.  No evidence for active hemorrhage.  3.  Partially visualized patchy groundglass infiltrates in both upper lobes. Also see CT chest report     CTA Chest 12/24/2021:  IMPRESSION:     1.  No pulmonary embolism.     2.  Soft tissue stranding in the region of the  strap musculature with ill-defined hypodensity measuring 2.8 x 1.4 cm in the cervical midline.  Findings concerning for infectious process or abscess. Neoplastic process could have similar imaging characteristics.     3.  Suggested erosion of the proximal right parasymphyseal aspect of the thyroid shield.  Correlated with CT soft tissue neck findings. Findings could represent osteomyelitis. Neoplastic process cannot be excluded.     4.  Hepatic steatosis.  5.  Thickening of the gastric wall. Prominence of perigastric vasculature. Small hiatal hernia.     6.  Moderate stool burden.  Correlate for constipation.        PET 12/15/2021:  IMPRESSION:     Substantial qualitative and quantitative increase of hypermetabolic FDG activity associated with the larynx in this patient with known supraglottic neoplasm.     No evidence of FDG avid metastatic disease involving neck, chest, abdomen or pelvis.     PET 8/21/2020:     Impression:     1. Intensely hypermetabolic plaque-like mass along the left true vocal cord, compatible with known laryngeal carcinoma.  2. No findings of regional metastatic disease in the neck, or distant metastatic disease.        CT Chest 8/10/2020:     IMPRESSION:     No metastatic disease within the chest.  Three-vessel coronary artery calcification. Nondilated cardiac  chambers     CT Soft neck 7/22/2020:  IMPRESSION:  1. Slight interval increase in size of the previously described  enhancing nodule along the anterior left vocal cord.  2. No pathologic lymphadenopathy.  3. Additional and incidental findings as noted above.     CT Soft neck  3/16/2020:     Impression:     6 mm enhancing focus involving the superior aspect of the left focal cord near the anterior commisure.  Recommend direct visualization for further evaluation of laryngeal polyp     Question of 2 mm submucosal lipoma involving the midportion of the superior aspect of the left vocal cord.     Mild arteriosclerosis involving the  brachiocephalic arteries and carotid arteries     Mild degenerative change of the cervical spine        I have reviewed all available lab results and radiology reports.    Assessment/Plan:   (1) 72 y.o. 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     - Patient has been under the care of Dr Khoobehi with [facility] and Dr Portillo with rad/onc.   - He was recently found to have a new primary cancer involving the base of his tongue in April 2022. He was deemed not to be a candidate for any further radiation and did not want to undergo any further surgery as this would necessitate a glossectomy procedure.   - He has been on adjuvant chemotherapy per direction of Dr Khoobehi and has had 3 cycles. His therapy course has been complicated with persistent diarrhea and N/V.      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 He saw Dr Lucero with Rad/onc this am. They are going to present his case to H&N Tumor Board at [facility] [place] and plans to see Dr James about surgical options. If he is not a surgical candidate then will proceed with cisplatin and XRT combine therapy.     10/6/2022:  - He saw Dr Lucero with Rad/onc, and Dr James and his case was presented to H&N Tumor Board at [facility] [place] and  he general consensus was to proceed to surgery.  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   - He had his portacath removed on 11/28.   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, keratinizing type, moderate to poorly differentiated tumor  - Four LN's were all negative  - he did have a positive left superior pharyngeal margin  - pT3 pN0  - reviewed the latest NCCN guidelines again from Version 1.2023; he may need some further systemic therapy due to the margin  - check on Rad/onc follow-up     1/23/2023:  - s/p new portacath placed on 1/12/2023 with Dr Le  - starting combined chemotherapy and XRT - cisplatin  - s/p chemotherapy school with [facility]    2/6/2023:  - he seems to be tolerating the chemotherapy well at this time  - continued with concomitant therapy with XRT    2/22/2023:  - he seems to be tolerating the chemotherapy well at this time  - continued with concomitant therapy with XRT  - labs relatively adequate  - will check on his refills    3/6/2023:  - finishing up XRT this comking Thursday  - last chemotherapy today    4/3/2023:  - He has since completed chemotherapy and  XRT.  He seems to have tolerated the regimen fairly well with no new complaints.  Some weight loss but reports he is staying hydrated. He does have some occasional GRIFFIN.   - He is seeing ENT tomorrow with Dr James    5/31/2023:  - He has since completed chemotherapy and  XRT.  He seems to have tolerated the regimen fairly well . He saw Dr James with ENT on 5/2/2023 and had repeat scope with good report per patient.  - He had recent PET yesterday on 5/30 which overall looks adequate         (2) Hx/of Laryngeal cancer in Aug 2020 stage II (cT2 N0)  - s/p radiation followed  by bilateral neck dissection and total thyroidectomy     Brief Oncology Summary for his laryngeal CA hx:     Patient was noted to initially have a suspicious lesion on the left true vocal cord by laryngoscopy with Dr Xiao in March 2020 with biopsy showing severe dysplastic changes without definitive invasive process. He had a CT scan on 3/16/2020 which showed a 6mm nodule on the left vocal cord. A repeat Ct scan four months later on 7/22/2020 showed the nodule enlarged to 1.4 x 1.1 cm in size. Patient was then seen by Dr Noel and underwent repeat scope in Aug 2020 who found a bulky deeply invading tumor which was debulked and the pathology coming back moderately differentiated SCCA without lymphovascular or perineural invasion. Hs case was subsequently presented to the tumor board and the consensus was to proceed with radiation. He completed XRT on 10/30/2020 and proceeded with regular laryngoscopy surveillance. He eventually required salvage laryngectomy and neck dissection with flap with Dr James on Jan 6th 2022. 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 Fifty lymph nodes were removed which were all negative. Pathology TNM was pT4a, pN0. Patient did not require any adjuvant therapy afterwards.      (2) HTN and hypercholesterolemia     (3) Paroxysmal atrial fibrillation, V tach     (4) DM II     (5) B12 deficiency      (6) Fatty liver disease, GERD           VISIT DIAGNOSES:      Tracheostomy in place    Malignant neoplasm of base of tongue    Chemotherapy-induced neutropenia    Larynx cancer    Laryngeal cancer    Iron deficiency anemia due to chronic blood loss    Vitamin B12 deficiency    Abnormal CT scan, neck          PLAN:  Proceed with ENT and rad/onc f/u as directed by them respectively   s/p chemotherapy school with [facility] - discussed the side-effect profile, provided  literature and obtained consents  F/u Rad/onc follow-up as directed  Check labs monthly for now  F/u with PCP, ENT, etc  Dietician f/u on the tube feeds     RTC in 12 weeks with myself  Fax note to  Bandar/Dameon Lucero Hasney, Jeff Marino, Raina       Discussion:     COVID-19 Discussion:     I had long discussion with patient and any applicable family about the COVID-19 coronavirus epidemic and the recommended precautions with regard to cancer and/or hematology patients. I have re-iterated the CDC recommendations for adequate hand washing, use of hand -like products, and coughing into elbow, etc. In addition, especially for our patients who are on chemotherapy and/or our otherwise immunocompromised patients, I have recommended avoidance of crowds, including movie theaters, restaurants, churches, etc. I have recommended avoidance of any sick or symptomatic family members and/or friends. I have also recommended avoidance of any raw and unwashed food products, and general avoidance of food items that have not been prepared by themselves. The patient has been asked to call us immediately with any symptom developments, issues, questions or other general concerns.         Pathology Discussion:     I reviewed and discussed the pathology report(s) and radiograph reports (if available) in as simple to understand and/or laymen's terms to the best of my ability. I had an indepth conversation with the patient and went over the patient's individual diagnosis based on the information that was currently available. I discussed the TNM staging process with regard to the patient's particular cancer type, and the calculated stage based on the currently available TNM data and literature. I discussed the available prognostic data with regard to the current staging information and how it relates to the prognosis of their particular neoplastic process.          NCCN Guidelines:     I discussed the available treatment  "option(s) in accordance with the latest literature from the NCCN Clinical Practice Guidelines for the patient's particular type of cancer disorder. The NCCN Guidelines provide a "document evidence-based (and) consensus-driven management" of the care of oncology patients. The treatment recommendations were made not only in accordance to the NCCN guidelines, but also factored in to account the patient's overall age, condition, performance status and their medical co-morbidities. I went over the risks and benefits of the the treatment options (if any could be made) with regard to their particular cancer type, their cancer stage, their age, and their co-morbidities.         Chemotherapy Discussion:      I discussed the available treatment option(s) in accordance with the latest/current national evidence-based guidelines (NCCN, UpToDate, NCI, ASCO, etc where applicable), their overall age/condition and their co-morbidities. I also went over the risks and benefits of the chemotherapy with regard to their particular cancer type, their cancer stage, their age/condition, and their co-morbidities. I provided literature on the chemotherapy regimen and discussed the chemotherapy side-effect profiles of the drug(s). I discussed the importance of compliance with obtaining and monitoring weekly lab work, and went over the potential hematopathology issues and risks with anemia, leucopenia and thrombocytopenia that can occur with chemotherapy. I discussed the potential risks of liver and kidney damage, which could be permanent and could necessitate dialysis long-term if kidney failure developed. I discussed the emetic and/or diarrheal potential of the regimen and the potential need for use of antiemetic and anti-diarrheal medications. I discussed the risk for development of anaphylactic shock, bronchospasm, dysrhythmia, and respiratory/cardiovascular arrest and/or failure. I discussed the potential risks for development of alopecia, " cold sensory issues, ringing in ears, vertigo, cataracts, glaucoma, and neuropathy, all of which could end up being chronic and life-long. Some chemotherpyI discussed the risks of hand-foot syndrome and rashes, and development of other autoimmune mediated processes such as pneumonitis, hepatitis, and colitis which could be life threatening. I discussed the risks of the potential development of a rare but fatal viral mediated disease known as PML (Progressive Multifocal Leukoencephalopathy), and risk of future development of leukemia and/or lymphoma from use of certain chemotherapy agents. I discussed the need for neutropenic precautions, basic hygiene/sanitation behaviors and dietary restrictions.    The patient's consent has been obtained to proceed with the chemotherapy.The patient will be referred to Chemotherapy School /[facility] for training and education on chemotherapy, use of antiemetics and/or anti-diarrheals, use of NSAID's, potential chemotherapy side-effects, and any specific recommendations and precautions with the particular chemotherapy agents.      I answered all of the patient's (and family's, if applicable) questions to the best of my ability and to their complete satisfaction. The patient acknowledged full understanding of the risks, recommendations and plan(s).     I have explained and the patient understands all of  the current recommendation(s). I have answered all of their questions to the best of my ability and to their complete satisfaction.         I spent over 25 mins of time with the patient. Reviewed results of the recently ordered labs, tests and studies; made directives with regards to the results. Over half of this time was spent couseling and coordinating care.    I have explained all of the above in detail and the patient understands all of the current recommendation(s). I have answered all of their questions to the best of my ability and to their complete satisfaction.    The patient is to continue with the current management plan.            Electronically signed by Venu Tamez MD                            Answers submitted by the patient for this visit:  Review of Systems Questionnaire (Submitted on 5/29/2023)  appetite change : No  unexpected weight change: No  mouth sores: No  visual disturbance: No  cough: No  shortness of breath: No  chest pain: No  abdominal pain: No  diarrhea: No  frequency: No  back pain: No  rash: No  headaches: No  adenopathy: No  nervous/ anxious: No

## 2023-05-31 ENCOUNTER — INFUSION (OUTPATIENT)
Dept: INFUSION THERAPY | Facility: HOSPITAL | Age: 73
End: 2023-05-31
Attending: INTERNAL MEDICINE
Payer: MEDICARE

## 2023-05-31 ENCOUNTER — CLINICAL SUPPORT (OUTPATIENT)
Dept: REHABILITATION | Facility: HOSPITAL | Age: 73
End: 2023-05-31
Payer: MEDICARE

## 2023-05-31 ENCOUNTER — OFFICE VISIT (OUTPATIENT)
Dept: HEMATOLOGY/ONCOLOGY | Facility: CLINIC | Age: 73
End: 2023-05-31
Payer: MEDICARE

## 2023-05-31 VITALS
SYSTOLIC BLOOD PRESSURE: 121 MMHG | HEIGHT: 66 IN | RESPIRATION RATE: 17 BRPM | WEIGHT: 133.13 LBS | DIASTOLIC BLOOD PRESSURE: 59 MMHG | BODY MASS INDEX: 21.08 KG/M2 | SYSTOLIC BLOOD PRESSURE: 109 MMHG | WEIGHT: 131.19 LBS | OXYGEN SATURATION: 100 % | BODY MASS INDEX: 21.4 KG/M2 | HEART RATE: 73 BPM | TEMPERATURE: 97 F | TEMPERATURE: 100 F | DIASTOLIC BLOOD PRESSURE: 59 MMHG | HEART RATE: 75 BPM | RESPIRATION RATE: 16 BRPM | HEIGHT: 66 IN

## 2023-05-31 DIAGNOSIS — E53.8 VITAMIN B12 DEFICIENCY: ICD-10-CM

## 2023-05-31 DIAGNOSIS — C32.9 LARYNGEAL CANCER: ICD-10-CM

## 2023-05-31 DIAGNOSIS — C01 MALIGNANT NEOPLASM OF BASE OF TONGUE: Primary | ICD-10-CM

## 2023-05-31 DIAGNOSIS — Z93.0 TRACHEOSTOMY IN PLACE: Primary | ICD-10-CM

## 2023-05-31 DIAGNOSIS — L90.5 SCAR CONDITIONS AND FIBROSIS OF SKIN: ICD-10-CM

## 2023-05-31 DIAGNOSIS — D50.0 IRON DEFICIENCY ANEMIA DUE TO CHRONIC BLOOD LOSS: ICD-10-CM

## 2023-05-31 DIAGNOSIS — R29.898 DECREASED ROM OF NECK: ICD-10-CM

## 2023-05-31 DIAGNOSIS — E86.0 DEHYDRATION: ICD-10-CM

## 2023-05-31 DIAGNOSIS — R93.89 ABNORMAL CT SCAN, NECK: ICD-10-CM

## 2023-05-31 DIAGNOSIS — C32.9 LARYNX CANCER: ICD-10-CM

## 2023-05-31 DIAGNOSIS — I89.0 LYMPHEDEMA DUE TO RADIATION: Primary | ICD-10-CM

## 2023-05-31 DIAGNOSIS — D70.1 CHEMOTHERAPY-INDUCED NEUTROPENIA: ICD-10-CM

## 2023-05-31 DIAGNOSIS — T45.1X5A CHEMOTHERAPY-INDUCED NEUTROPENIA: ICD-10-CM

## 2023-05-31 DIAGNOSIS — C01 MALIGNANT NEOPLASM OF BASE OF TONGUE: ICD-10-CM

## 2023-05-31 PROCEDURE — 3008F PR BODY MASS INDEX (BMI) DOCUMENTED: ICD-10-PCS | Mod: CPTII,S$GLB,, | Performed by: INTERNAL MEDICINE

## 2023-05-31 PROCEDURE — 3066F NEPHROPATHY DOC TX: CPT | Mod: CPTII,S$GLB,, | Performed by: INTERNAL MEDICINE

## 2023-05-31 PROCEDURE — 3044F PR MOST RECENT HEMOGLOBIN A1C LEVEL <7.0%: ICD-10-PCS | Mod: CPTII,S$GLB,, | Performed by: INTERNAL MEDICINE

## 2023-05-31 PROCEDURE — 96523 IRRIG DRUG DELIVERY DEVICE: CPT

## 2023-05-31 PROCEDURE — 1126F PR PAIN SEVERITY QUANTIFIED, NO PAIN PRESENT: ICD-10-PCS | Mod: CPTII,S$GLB,, | Performed by: INTERNAL MEDICINE

## 2023-05-31 PROCEDURE — 3078F DIAST BP <80 MM HG: CPT | Mod: CPTII,S$GLB,, | Performed by: INTERNAL MEDICINE

## 2023-05-31 PROCEDURE — 63600175 PHARM REV CODE 636 W HCPCS: Performed by: INTERNAL MEDICINE

## 2023-05-31 PROCEDURE — 3061F NEG MICROALBUMINURIA REV: CPT | Mod: CPTII,S$GLB,, | Performed by: INTERNAL MEDICINE

## 2023-05-31 PROCEDURE — 1160F RVW MEDS BY RX/DR IN RCRD: CPT | Mod: CPTII,S$GLB,, | Performed by: INTERNAL MEDICINE

## 2023-05-31 PROCEDURE — 3066F PR DOCUMENTATION OF TREATMENT FOR NEPHROPATHY: ICD-10-PCS | Mod: CPTII,S$GLB,, | Performed by: INTERNAL MEDICINE

## 2023-05-31 PROCEDURE — 3074F SYST BP LT 130 MM HG: CPT | Mod: CPTII,S$GLB,, | Performed by: INTERNAL MEDICINE

## 2023-05-31 PROCEDURE — 97140 MANUAL THERAPY 1/> REGIONS: CPT | Mod: PN

## 2023-05-31 PROCEDURE — 3288F PR FALLS RISK ASSESSMENT DOCUMENTED: ICD-10-PCS | Mod: CPTII,S$GLB,, | Performed by: INTERNAL MEDICINE

## 2023-05-31 PROCEDURE — 25000003 PHARM REV CODE 250: Performed by: INTERNAL MEDICINE

## 2023-05-31 PROCEDURE — 97530 THERAPEUTIC ACTIVITIES: CPT | Mod: PN

## 2023-05-31 PROCEDURE — 3008F BODY MASS INDEX DOCD: CPT | Mod: CPTII,S$GLB,, | Performed by: INTERNAL MEDICINE

## 2023-05-31 PROCEDURE — 3061F PR NEG MICROALBUMINURIA RESULT DOCUMENTED/REVIEW: ICD-10-PCS | Mod: CPTII,S$GLB,, | Performed by: INTERNAL MEDICINE

## 2023-05-31 PROCEDURE — 3044F HG A1C LEVEL LT 7.0%: CPT | Mod: CPTII,S$GLB,, | Performed by: INTERNAL MEDICINE

## 2023-05-31 PROCEDURE — 1159F PR MEDICATION LIST DOCUMENTED IN MEDICAL RECORD: ICD-10-PCS | Mod: CPTII,S$GLB,, | Performed by: INTERNAL MEDICINE

## 2023-05-31 PROCEDURE — 99214 PR OFFICE/OUTPT VISIT, EST, LEVL IV, 30-39 MIN: ICD-10-PCS | Mod: S$GLB,,, | Performed by: INTERNAL MEDICINE

## 2023-05-31 PROCEDURE — 99214 OFFICE O/P EST MOD 30 MIN: CPT | Mod: S$GLB,,, | Performed by: INTERNAL MEDICINE

## 2023-05-31 PROCEDURE — 1101F PT FALLS ASSESS-DOCD LE1/YR: CPT | Mod: CPTII,S$GLB,, | Performed by: INTERNAL MEDICINE

## 2023-05-31 PROCEDURE — 1101F PR PT FALLS ASSESS DOC 0-1 FALLS W/OUT INJ PAST YR: ICD-10-PCS | Mod: CPTII,S$GLB,, | Performed by: INTERNAL MEDICINE

## 2023-05-31 PROCEDURE — 3288F FALL RISK ASSESSMENT DOCD: CPT | Mod: CPTII,S$GLB,, | Performed by: INTERNAL MEDICINE

## 2023-05-31 PROCEDURE — 1126F AMNT PAIN NOTED NONE PRSNT: CPT | Mod: CPTII,S$GLB,, | Performed by: INTERNAL MEDICINE

## 2023-05-31 PROCEDURE — A4216 STERILE WATER/SALINE, 10 ML: HCPCS | Performed by: INTERNAL MEDICINE

## 2023-05-31 PROCEDURE — 1159F MED LIST DOCD IN RCRD: CPT | Mod: CPTII,S$GLB,, | Performed by: INTERNAL MEDICINE

## 2023-05-31 PROCEDURE — 3074F PR MOST RECENT SYSTOLIC BLOOD PRESSURE < 130 MM HG: ICD-10-PCS | Mod: CPTII,S$GLB,, | Performed by: INTERNAL MEDICINE

## 2023-05-31 PROCEDURE — 3078F PR MOST RECENT DIASTOLIC BLOOD PRESSURE < 80 MM HG: ICD-10-PCS | Mod: CPTII,S$GLB,, | Performed by: INTERNAL MEDICINE

## 2023-05-31 PROCEDURE — 1160F PR REVIEW ALL MEDS BY PRESCRIBER/CLIN PHARMACIST DOCUMENTED: ICD-10-PCS | Mod: CPTII,S$GLB,, | Performed by: INTERNAL MEDICINE

## 2023-05-31 RX ORDER — SODIUM CHLORIDE 0.9 % (FLUSH) 0.9 %
10 SYRINGE (ML) INJECTION
Status: DISCONTINUED | OUTPATIENT
Start: 2023-05-31 | End: 2023-05-31 | Stop reason: HOSPADM

## 2023-05-31 RX ORDER — HEPARIN 100 UNIT/ML
500 SYRINGE INTRAVENOUS
Status: CANCELLED | OUTPATIENT
Start: 2023-05-31

## 2023-05-31 RX ORDER — HEPARIN 100 UNIT/ML
500 SYRINGE INTRAVENOUS
Status: DISCONTINUED | OUTPATIENT
Start: 2023-05-31 | End: 2023-05-31 | Stop reason: HOSPADM

## 2023-05-31 RX ORDER — SODIUM CHLORIDE 0.9 % (FLUSH) 0.9 %
10 SYRINGE (ML) INJECTION
Status: CANCELLED | OUTPATIENT
Start: 2023-05-31

## 2023-05-31 RX ADMIN — HEPARIN 500 UNITS: 100 SYRINGE at 11:05

## 2023-05-31 RX ADMIN — SODIUM CHLORIDE, PRESERVATIVE FREE 10 ML: 5 INJECTION INTRAVENOUS at 11:05

## 2023-05-31 NOTE — PROGRESS NOTES
[facility]   Physical Therapy Treatment Note   Lymphedema Upper Extremity    Name: Cyrus Batres Jr.  Clinic Number: [id number]    Therapy Diagnosis:   Encounter Diagnoses   Name Primary?    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    Scar conditions and fibrosis of skin     Decreased ROM of neck      Physician: Matheus Portillo Jr., MD    Visit Date: 5/31/2023    Physician Orders: PT Eval and Treat   Medical Diagnosis from Referral: Malignant neoplasm of base of tongue  - Primary   Evaluation Date: 5/8/2023  Authorization Period Expiration: 06-  Plan of Care Expiration: 08-  Progress Note Due: v 10  Visit # / Visits authorized: 6/ 10   FOTO: not completed       Time In: 1300  Time Out: 1355  Total Billable Time: 55 minutes    SUBJECTIVE     Nicolás reports: He did well after last appointment, noting continued increased salivation. He reports that he had rebound fluid load to submental area since last treatment.  He reports that he was able to tolerate the kinesiotape x 1 day after last treatment without skin irritation.  He said that it helped to manage the fluid accumulation under his chin.    He had his PET scan and gave a thumbs up when asked about the results.  He stated that his doctor recommended that he continue PT through August, additional appointments will be scheduled.    Nicolás reported wearing compression outside of therapy visits: Yes - to anterior neck as instructed at initial evaluation  Nicolás was compliant with home exercise program.    Response to previous treatment: excellent  Functional change: improvement in skin quality and cervical spine range of motion     Pain: 0/10  Location: bilateral anterior neck      OBJECTIVE     Girth Measurements:     Girth Measurements (in centimeters)  LANDMARK Head/Neck Measurement Face  Right Face Left    Diagonal Head Circumference 55       Vertical Submental Circumference         Mandibular Angle to Mandibular Angle 27.5        Tragus Angle to Tragus Angle 32.0       Neck Superior 37.5       Neck Middle 34.0       Neck Inferior  34.5       Tragus to Mental Protuberance         Tragus to Mouth Angle         Mandibular Angle to Nasal Wing         Mandibular Angle to Internal Eye Corner         Mandibular Angle to External Eye Corner         Mental Protuberance to Internal Eye Corner         Mandibular Angle to Mental Protuberance         Totals         Total girth measurement-   220.5 cm       Total Girth Reduction                   Treatment     Nicolás received the treatments listed below:      Patient received from waiting room.     manual therapy techniques: Complete Decongestive Therapy was applied to the: head and neck for 45 minutes, including: manual lymphatic drainage and short stretch compression bandaging     MANUAL LYMPHATIC DRAINAGE:   Short neck  Prom with end range of motion prolonged passive stretch to anterior chest, shoulder depression  Grade 2-3 depression mobilizations to the first rib, right and left   Long neck  Anterior neck routed posterior to the radiation therapy zone and then to terminus  Fibrosis techniques to scar and fibrosis at anterior neck/radiation therapy zone - bulk of treatment  Mobilization,  cartilages in anterior neck and trachea  S deformation and elongation of SCM and anterior strap muscle  PPS to left and right SCM with overpressure  Redo long neck  Deep breathing  Grade 2-3 mobilizations to increase accessory mobility to left and right first rib depression, PA mobilizations T3-C2 - all x 10 repetitions ea      Kinesiotaping to submental and anterior neck, above trach, using open herlinda pattern.  Reviewed wear schedule and precautions with taping.  He was instructed to remove tomorrow and cleanse his skin prior to the PET scan.  He was given additional kinesiotape for home application until next visit, with verbal review of application and skin care.  He nodded understanding.    He was issued  additional foam chips pad to be worn under the swell spot under his chin.  It was fabricated using higher density closed cell (orange) foam.  Patient noted to have improved segmentation of cervical spine and head from torso with cervical rotation and extension.     therapeutic exercises to develop ROM, flexibility, and posture for 2 minutes including:   Active assisted range of motion -> resisted range of motion  cervical retraction for mobilization of anterior neck soft tissues.    Picture of Nterior neck, with and with/o kinesiotape,  in supine, 05- treatment; taken via Epic Haiku on therapist's cell phone with verbal consent from patient:          Patient Education and Home Exercises     therapeutic activities to improve functional performance for 0  minutes, including:    Garments:  None - will consider Versiface swell spot for neck - information given 05-  Garments Ordered: No    Home Exercises and Patient Education Provided     Education provided:   - Educated in self massage to abdominal areas, and cervical region  - Patient to leave short-stretch compression bandages intact for 12-24 hours as tolerated, discontinue with any problems, return rolled bandages next session. Wash and wear schedules confirmed.   - Continue HEP of AROM, stretching, and postural correction.   - Educated on self or assisted bandaging, compression options, and risk reduction    Written Home Exercises Provided: Patient instructed to cont prior HEP. Exercises were reviewed and Nicolás was able to demonstrate them prior to the end of the session.  Nicolás demonstrated good  understanding of the education provided. See EMR under Patient Instructions for exercises provided during therapy sessions    ASSESSMENT     Nicolás had (+) response to manual therapy during treatment, yet he demonstrated continued rebound in his lymphedema. His skin quality has improved, evidenced by decreased adherence to basement layer.  He continues  with increased scar tension, elongation of scar tissue was focus of today's session.  He appears to have great carry over with home plan of care recommendations.      Nicolás Is progressing well towards his goals.   Pt prognosis is Good.     Patient will continue to benefit from skilled outpatient physical therapy to address the deficits listed in the problem list box on initial evaluation, provide patient/family education and to maximize patient's level of independence in the home and community environment.     Patient's spiritual, cultural and educational needs considered and patient agreeable to plan of care and goals.     Anticipated barriers to physical therapy: severity of fibrosis and scarring    Goals:   The following goals were discussed with the patient and patient is in agreement with them as to be addressed in the treatment plan.      Short Term Goals: (6 weeks)   Goals: Progressing / MET Date Established Date Assessed   1. Patient will demonstrate decreased localized lymphedema to the jawline by  palpation and visualization of improved symmetry left and right  PROGRESSING 05-     2. Patient will demonstrate 100% knowledge of lymphedema precautions and signs of infection to allow for reduced lymphedema risk, infection risk, and/or exacerbation of condition.  PROGRESSING 05-  MET 05-   3. Patient or caregiver will perform self-bandaging techniques and/or wearing of compression garments to allow for lymphatic drainage support, skin elasticity, and reduction in shape and size of limb.  PROGRESSING 05-  MET 05-   4. Patient will perform self lymph drainage techniques to areas within reach to enhance lymphatic drainage and skin condition.  PROGRESSING 05-  MET 05-   5. Patient will tolerate daily activities with external foam chips compression/ bandaging to allow for lymphatic and venous support.  PROGRESSING 05-  MET 05-      Long Term Goals: (12   weeks)   Goals: Progressing / MET Date Established Date Assessed   1. Patient will show decreased total girth measurement in sum of measurements to head and neck by up to 2 cm  to allow for improved comfort. PROGRESSING 05-     2. Patient will show reduction in density to mild/moderate or less with improved contour of limb to allow for cosmesis, symmetry, infection risk reduction, excursion of sift tissues for swallowing to clear secretions to protect the airway PROGRESSING 05-     3. Patient to malinda/doff compression garment with daily compliance to assist in lymphedema management, skin elasticity, and tissue density PROGRESSING 05-  MET 05-   4. Patient to show improved postural awareness and alignment. PROGRESSING 05-     5. Patient to be independent and compliant with home exercise program to allow for increased function in affected limb. PROGRESSING 05-         PLAN     Continue Physical Therapy  2x weekly for 6 weeks Complete Decongestive Therapy:  Manual lymphatic drainage, Multilayered short stretch bandaging, Pneumatic compression, Therapeutic exercises, and Patient education as deemed necessary to achieve stated goals.    Patient to schedule additional treatments through July at provider's request.     Ena Mott, PT CLT   5/31/2023

## 2023-05-31 NOTE — PLAN OF CARE
Problem: Fatigue  Goal: Improved Activity Tolerance  Intervention: Promote Improved Energy  Flowsheets (Taken 5/31/2023 1144)  Fatigue Management: fatigue-related activity identified  Activity Management: Ambulated -L4

## 2023-06-01 ENCOUNTER — TELEPHONE (OUTPATIENT)
Dept: HEMATOLOGY/ONCOLOGY | Facility: CLINIC | Age: 73
End: 2023-06-01

## 2023-06-01 NOTE — PROGRESS NOTES
Incoming call from mother, she was advised of results. Mother had a question on what further steps would be moving forward. If any more labs or imaging should be done.    Patient seen and examined.  Doing well.  Denies abdominal pain.  Having bowel function.      Abdomen benign   Vitals are stable    Labs reviewed, stable neutropenia     CT scan was reviewed.  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  Imaging shows improvement of overall appearance.  Patient has been stable.  He is having bowel function.  Would continue supportive care with antibiotics.  Okay to resume trickle tube feeds today.  Will continue to follow.

## 2023-06-01 NOTE — TELEPHONE ENCOUNTER
----- Message from Ml Avery RN sent at 6/1/2023  3:06 PM CDT -----  Wife wanted to know if PET scan was free of any remaining cancer, can you please review and let me know  ----- Message -----  From: Bere Mcqueen  Sent: 5/31/2023   2:37 PM CDT  To: [facility] Staff    Pt's wife Janel is asking doc to give her a call back to answer some questions that she has that are personal. I asked her if his nurse could call back and she said that she only wanted to speak to doc if possible.     [phone]

## 2023-06-06 ENCOUNTER — OFFICE VISIT (OUTPATIENT)
Dept: OPHTHALMOLOGY | Facility: CLINIC | Age: 73
End: 2023-06-06
Payer: MEDICARE

## 2023-06-06 ENCOUNTER — OFFICE VISIT (OUTPATIENT)
Dept: SURGICAL ONCOLOGY | Facility: CLINIC | Age: 73
End: 2023-06-06
Payer: MEDICARE

## 2023-06-06 VITALS — WEIGHT: 131.38 LBS | BODY MASS INDEX: 21.21 KG/M2

## 2023-06-06 DIAGNOSIS — C32.9 LARYNX CANCER: ICD-10-CM

## 2023-06-06 DIAGNOSIS — H40.013 OAG (OPEN ANGLE GLAUCOMA) SUSPECT, LOW RISK, BILATERAL: ICD-10-CM

## 2023-06-06 DIAGNOSIS — E11.9 DIABETES MELLITUS TYPE 2 WITHOUT RETINOPATHY: ICD-10-CM

## 2023-06-06 DIAGNOSIS — H53.2 DIPLOPIA: ICD-10-CM

## 2023-06-06 DIAGNOSIS — C01 MALIGNANT NEOPLASM OF BASE OF TONGUE: Primary | ICD-10-CM

## 2023-06-06 DIAGNOSIS — H43.813 POSTERIOR VITREOUS DETACHMENT OF BOTH EYES: ICD-10-CM

## 2023-06-06 DIAGNOSIS — H25.813 COMBINED FORMS OF AGE-RELATED CATARACT, BILATERAL: Primary | ICD-10-CM

## 2023-06-06 PROCEDURE — 31575 PR LARYNGOSCOPY, FLEXIBLE; DIAGNOSTIC: ICD-10-PCS | Mod: S$GLB,,, | Performed by: OTOLARYNGOLOGY

## 2023-06-06 PROCEDURE — 2023F PR DILATED RETINAL EXAM W/O EVID OF RETINOPATHY: ICD-10-PCS | Mod: CPTII,S$GLB,, | Performed by: OPHTHALMOLOGY

## 2023-06-06 PROCEDURE — 1126F AMNT PAIN NOTED NONE PRSNT: CPT | Mod: CPTII,S$GLB,, | Performed by: OPHTHALMOLOGY

## 2023-06-06 PROCEDURE — 2023F DILAT RTA XM W/O RTNOPTHY: CPT | Mod: CPTII,S$GLB,, | Performed by: OPHTHALMOLOGY

## 2023-06-06 PROCEDURE — 3044F HG A1C LEVEL LT 7.0%: CPT | Mod: CPTII,S$GLB,, | Performed by: OPHTHALMOLOGY

## 2023-06-06 PROCEDURE — 3061F PR NEG MICROALBUMINURIA RESULT DOCUMENTED/REVIEW: ICD-10-PCS | Mod: CPTII,S$GLB,, | Performed by: OPHTHALMOLOGY

## 2023-06-06 PROCEDURE — 3288F FALL RISK ASSESSMENT DOCD: CPT | Mod: CPTII,S$GLB,, | Performed by: OTOLARYNGOLOGY

## 2023-06-06 PROCEDURE — 3044F HG A1C LEVEL LT 7.0%: CPT | Mod: CPTII,S$GLB,, | Performed by: OTOLARYNGOLOGY

## 2023-06-06 PROCEDURE — 3288F PR FALLS RISK ASSESSMENT DOCUMENTED: ICD-10-PCS | Mod: CPTII,S$GLB,, | Performed by: OPHTHALMOLOGY

## 2023-06-06 PROCEDURE — 3066F NEPHROPATHY DOC TX: CPT | Mod: CPTII,S$GLB,, | Performed by: OPHTHALMOLOGY

## 2023-06-06 PROCEDURE — 1101F PR PT FALLS ASSESS DOC 0-1 FALLS W/OUT INJ PAST YR: ICD-10-PCS | Mod: CPTII,S$GLB,, | Performed by: OTOLARYNGOLOGY

## 2023-06-06 PROCEDURE — 1101F PR PT FALLS ASSESS DOC 0-1 FALLS W/OUT INJ PAST YR: ICD-10-PCS | Mod: CPTII,S$GLB,, | Performed by: OPHTHALMOLOGY

## 2023-06-06 PROCEDURE — 1160F RVW MEDS BY RX/DR IN RCRD: CPT | Mod: CPTII,S$GLB,, | Performed by: OTOLARYNGOLOGY

## 2023-06-06 PROCEDURE — 1159F MED LIST DOCD IN RCRD: CPT | Mod: CPTII,S$GLB,, | Performed by: OPHTHALMOLOGY

## 2023-06-06 PROCEDURE — 99204 OFFICE O/P NEW MOD 45 MIN: CPT | Mod: S$GLB,,, | Performed by: OPHTHALMOLOGY

## 2023-06-06 PROCEDURE — 3044F PR MOST RECENT HEMOGLOBIN A1C LEVEL <7.0%: ICD-10-PCS | Mod: CPTII,S$GLB,, | Performed by: OPHTHALMOLOGY

## 2023-06-06 PROCEDURE — 1126F PR PAIN SEVERITY QUANTIFIED, NO PAIN PRESENT: ICD-10-PCS | Mod: CPTII,S$GLB,, | Performed by: OPHTHALMOLOGY

## 2023-06-06 PROCEDURE — 3008F PR BODY MASS INDEX (BMI) DOCUMENTED: ICD-10-PCS | Mod: CPTII,S$GLB,, | Performed by: OTOLARYNGOLOGY

## 2023-06-06 PROCEDURE — 3008F BODY MASS INDEX DOCD: CPT | Mod: CPTII,S$GLB,, | Performed by: OTOLARYNGOLOGY

## 2023-06-06 PROCEDURE — 31575 DIAGNOSTIC LARYNGOSCOPY: CPT | Mod: S$GLB,,, | Performed by: OTOLARYNGOLOGY

## 2023-06-06 PROCEDURE — 1160F PR REVIEW ALL MEDS BY PRESCRIBER/CLIN PHARMACIST DOCUMENTED: ICD-10-PCS | Mod: CPTII,S$GLB,, | Performed by: OPHTHALMOLOGY

## 2023-06-06 PROCEDURE — 3066F NEPHROPATHY DOC TX: CPT | Mod: CPTII,S$GLB,, | Performed by: OTOLARYNGOLOGY

## 2023-06-06 PROCEDURE — 3066F PR DOCUMENTATION OF TREATMENT FOR NEPHROPATHY: ICD-10-PCS | Mod: CPTII,S$GLB,, | Performed by: OTOLARYNGOLOGY

## 2023-06-06 PROCEDURE — 3288F PR FALLS RISK ASSESSMENT DOCUMENTED: ICD-10-PCS | Mod: CPTII,S$GLB,, | Performed by: OTOLARYNGOLOGY

## 2023-06-06 PROCEDURE — 1160F PR REVIEW ALL MEDS BY PRESCRIBER/CLIN PHARMACIST DOCUMENTED: ICD-10-PCS | Mod: CPTII,S$GLB,, | Performed by: OTOLARYNGOLOGY

## 2023-06-06 PROCEDURE — 1126F AMNT PAIN NOTED NONE PRSNT: CPT | Mod: CPTII,S$GLB,, | Performed by: OTOLARYNGOLOGY

## 2023-06-06 PROCEDURE — 3288F FALL RISK ASSESSMENT DOCD: CPT | Mod: CPTII,S$GLB,, | Performed by: OPHTHALMOLOGY

## 2023-06-06 PROCEDURE — 3066F PR DOCUMENTATION OF TREATMENT FOR NEPHROPATHY: ICD-10-PCS | Mod: CPTII,S$GLB,, | Performed by: OPHTHALMOLOGY

## 2023-06-06 PROCEDURE — 99999 PR PBB SHADOW E&M-EST. PATIENT-LVL III: ICD-10-PCS | Mod: PBBFAC,,, | Performed by: OTOLARYNGOLOGY

## 2023-06-06 PROCEDURE — 1101F PT FALLS ASSESS-DOCD LE1/YR: CPT | Mod: CPTII,S$GLB,, | Performed by: OTOLARYNGOLOGY

## 2023-06-06 PROCEDURE — 1159F PR MEDICATION LIST DOCUMENTED IN MEDICAL RECORD: ICD-10-PCS | Mod: CPTII,S$GLB,, | Performed by: OTOLARYNGOLOGY

## 2023-06-06 PROCEDURE — 99999 PR PBB SHADOW E&M-EST. PATIENT-LVL III: CPT | Mod: PBBFAC,,, | Performed by: OTOLARYNGOLOGY

## 2023-06-06 PROCEDURE — 1126F PR PAIN SEVERITY QUANTIFIED, NO PAIN PRESENT: ICD-10-PCS | Mod: CPTII,S$GLB,, | Performed by: OTOLARYNGOLOGY

## 2023-06-06 PROCEDURE — 3061F PR NEG MICROALBUMINURIA RESULT DOCUMENTED/REVIEW: ICD-10-PCS | Mod: CPTII,S$GLB,, | Performed by: OTOLARYNGOLOGY

## 2023-06-06 PROCEDURE — 3044F PR MOST RECENT HEMOGLOBIN A1C LEVEL <7.0%: ICD-10-PCS | Mod: CPTII,S$GLB,, | Performed by: OTOLARYNGOLOGY

## 2023-06-06 PROCEDURE — 99213 OFFICE O/P EST LOW 20 MIN: CPT | Mod: 25,S$GLB,, | Performed by: OTOLARYNGOLOGY

## 2023-06-06 PROCEDURE — 3061F NEG MICROALBUMINURIA REV: CPT | Mod: CPTII,S$GLB,, | Performed by: OTOLARYNGOLOGY

## 2023-06-06 PROCEDURE — 1101F PT FALLS ASSESS-DOCD LE1/YR: CPT | Mod: CPTII,S$GLB,, | Performed by: OPHTHALMOLOGY

## 2023-06-06 PROCEDURE — 99999 PR PBB SHADOW E&M-EST. PATIENT-LVL III: CPT | Mod: PBBFAC,,, | Performed by: OPHTHALMOLOGY

## 2023-06-06 PROCEDURE — 3061F NEG MICROALBUMINURIA REV: CPT | Mod: CPTII,S$GLB,, | Performed by: OPHTHALMOLOGY

## 2023-06-06 PROCEDURE — 1159F PR MEDICATION LIST DOCUMENTED IN MEDICAL RECORD: ICD-10-PCS | Mod: CPTII,S$GLB,, | Performed by: OPHTHALMOLOGY

## 2023-06-06 PROCEDURE — 1159F MED LIST DOCD IN RCRD: CPT | Mod: CPTII,S$GLB,, | Performed by: OTOLARYNGOLOGY

## 2023-06-06 PROCEDURE — 99999 PR PBB SHADOW E&M-EST. PATIENT-LVL III: ICD-10-PCS | Mod: PBBFAC,,, | Performed by: OPHTHALMOLOGY

## 2023-06-06 PROCEDURE — 1160F RVW MEDS BY RX/DR IN RCRD: CPT | Mod: CPTII,S$GLB,, | Performed by: OPHTHALMOLOGY

## 2023-06-06 PROCEDURE — 99204 PR OFFICE/OUTPT VISIT, NEW, LEVL IV, 45-59 MIN: ICD-10-PCS | Mod: S$GLB,,, | Performed by: OPHTHALMOLOGY

## 2023-06-06 PROCEDURE — 99213 PR OFFICE/OUTPT VISIT, EST, LEVL III, 20-29 MIN: ICD-10-PCS | Mod: 25,S$GLB,, | Performed by: OTOLARYNGOLOGY

## 2023-06-06 NOTE — PROGRESS NOTES
Chief Complaint   Patient presents with    Follow-up     1 month     Oncology History   Larynx cancer   8/4/2020 Initial Diagnosis    Larynx neoplasm malignant     8/6/2020 Tumor Conference    His case was discussed at the Multidisciplinary Head and Neck Team Planning Meeting.    Representatives from Medical Oncology, Radiation Oncology, Head and Neck Surgical Oncology, Psychosocial Oncology, and Speech and Language Pathology discussed the case with the following recommendations:    1) definitive radiation              8/6/2020 Cancer Staged    Staging form: Larynx - Glottis, AJCC 8th Edition  - Clinical stage from 8/6/2020: Stage II (cT2, cN0, cM0)     8/6/2020 Cancer Staged    Cancer Staging  Larynx neoplasm malignant  Staging form: Larynx - Glottis, AJCC 8th Edition  - Clinical stage from 8/6/2020: Stage II (cT2, cN0, cM0) - Signed by Fariha Muñoz NP on 8/6/2020 12/16/2021 Tumor Conference    His case was discussed at the Multidisciplinary Head and Neck Team Planning Meeting.    Representatives from Medical Oncology, Radiation Oncology, Head and Neck Surgical Oncology, Psychosocial Oncology, and Speech and Language Pathology discussed the case with the following recommendations:    1) TL  2) oncology referral  3) psychology referral            12/27/2021 Surgery    1. DL And tracheostomy  2. PEG     1/6/2022 Surgery    1. Diagnostic direct laryngoscopy  2. Bilateral modified neck dissection of levels 2 through 4  3. Total laryngectomy  4. Total thyroidectomy with bilateral paratracheal/upper mediastinal neck dissection  5. Autotransplantation of left inferior parathyroid into left sternocleidomastoid muscle   6. Left anterolateral thigh free flap for closure of total laryngectomy neck skin defect  7. Advancement flap for closure of left thigh donor site advanced area was 25 x 10 cm     1/20/2022 Cancer Staged    Staging form: Larynx - Glottis, AJCC 8th Edition  - Pathologic stage from 1/20/2022: Stage  LOU (pT4a, pN0, cM0)     5/30/2022 Cancer Staged    Staging form: Pharynx - P16 Negative Oropharynx, AJCC 8th Edition  - Clinical: Stage II (rcT2, cN0, cM0)     1/23/2023 -  Chemotherapy    Treatment Summary   Plan Name: OP HEAD NECK CISPLATIN WEEKLY + RADIOTHERAPY  Treatment Goal: Curative  Status: Active  Start Date: 1/23/2023  End Date: 3/6/2023  Provider: Venu Tamez MD  Chemotherapy: CISplatin (Platinol) 40 mg/m2 = 66 mg in sodium chloride 0.9% 631 mL chemo infusion, 40 mg/m2 = 66 mg, Intravenous, Clinic/HOD 1 time, 7 of 7 cycles  Administration: 66 mg (1/23/2023), 66 mg (2/13/2023), 66 mg (2/6/2023), 66 mg (2/20/2023), 66 mg (2/27/2023), 66 mg (3/6/2023)     Malignant neoplasm of base of tongue   5/20/2022 Cancer Staged    Staging form: Pharynx - P16 Negative Oropharynx, AJCC 8th Edition  - Clinical stage from 5/20/2022: Stage II (cT2, cN0, cM0, p16-)     6/22/2022 Initial Diagnosis    Primary cancer of base of tongue     7/11/2022 - 8/26/2022 Chemotherapy    Treatment Summary   Plan Name: OP HEAD NECK PEMBROLIZUMAB CARBOPLATIN FLUOROURACIL Q3W FOLLOWED BY MAINTENANCE PEMBROLIZUMAB 400 MG Q6W  Treatment Goal: Palliative  Status: Inactive  Start Date: 7/11/2022  End Date: 8/26/2022  Provider: Aurash Khoobehi, MD  Chemotherapy: CARBOplatin (PARAPLATIN) 440 mg in sodium chloride 0.9% 544 mL chemo infusion, 440 mg (100 % of original dose 438.5 mg), Intravenous, Clinic/HOD 1 time, 3 of 6 cycles  Dose modification:   (original dose 438.5 mg, Cycle 1)  Administration: 440 mg (7/11/2022), 435 mg (8/1/2022), 510 mg (8/22/2022)     1/23/2023 -  Chemotherapy    Treatment Summary   Plan Name: OP HEAD NECK CISPLATIN WEEKLY + RADIOTHERAPY  Treatment Goal: Curative  Status: Active  Start Date: 1/23/2023  End Date: 3/6/2023  Provider: Venu Tamez MD  Chemotherapy: CISplatin (Platinol) 40 mg/m2 = 66 mg in sodium chloride 0.9% 631 mL chemo infusion, 40 mg/m2 = 66 mg, Intravenous, Clinic/HOD 1 time, 7 of 7  cycles  Administration: 66 mg (1/23/2023), 66 mg (2/13/2023), 66 mg (2/6/2023), 66 mg (2/20/2023), 66 mg (2/27/2023), 66 mg (3/6/2023)           HPI   72 y.o. male presents with the above treatment history.  He is status post total glossectomy and ALT free flap reconstruction. No significant complaints.  He completed CRT on 3/9/23.  Recent PET scan clear.    Review of Systems   Constitutional: Negative for fatigue and unexpected weight change.   HENT: Per HPI.  Eyes: Negative for visual disturbance.   Respiratory: Negative for shortness of breath, hemoptysis   Cardiovascular: Negative for chest pain and palpitations.   Musculoskeletal: Negative for decreased ROM, back pain.   Skin: Negative for rash, sunburn, itching.   Neurological: Negative for dizziness and seizures.   Hematological: Negative for adenopathy. Does not bruise/bleed easily.   Endocrine: Negative for rapid weight loss/weight gain, heat/cold intolerance.     Past Medical History   Patient Active Problem List   Diagnosis    Melanocytic nevus    Body mass index (BMI) of 29.0-29.9 in adult    Benign hypertension    Overweight    Paroxysmal atrial fibrillation    Type 2 diabetes mellitus with diabetic arthropathy, with long-term current use of insulin    Fatty liver disease, nonalcoholic    False positive serological test for hepatitis C    Hyperlipidemia    Type 2 diabetes mellitus with hyperglycemia, without long-term current use of insulin    Gastroesophageal reflux disease without esophagitis    Type 2 diabetes mellitus with hyperglycemia    Vitamin B12 deficiency    Hyperuricemia    Hypovitaminosis D    Proteinuria    On enteral nutrition    Larynx cancer    Dehydration    NSVT (nonsustained ventricular tachycardia)    Adverse effect of adrenal cortical steroids, sequela    S/P laryngectomy    Hypocalcemia    Aphonia    Dysphagia, pharyngoesophageal    Acute pain of both shoulders    Bilateral arm weakness    Oral bleeding    Malignant neoplasm of  base of tongue    Advanced care planning/counseling discussion    Iron deficiency anemia due to chronic blood loss    Postoperative hypothyroidism    Pressure injury of sacral region, stage 1    Pneumatosis intestinalis    Nausea and vomiting    Neutropenia    Abnormal CT scan, neck    Open wound of neck    Open wnd of pharynx    Open wound of left thigh    Open wound of mouth    Tracheostomy in place    Malnutrition of mild degree    Type 2 diabetes mellitus, without long-term current use of insulin    Laryngeal cancer    Hypocalcemia    Hyperbilirubinemia    Elevated transaminase level    Hyponatremia    PEG (percutaneous endoscopic gastrostomy) adjustment/replacement/removal    Hypothyroidism    Dehydration    Pharyngocutaneous fistula    Protein malnutrition    Elevated alkaline phosphatase level    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    Scar conditions and fibrosis of skin    Decreased ROM of neck           Past Surgical History   Past Surgical History:   Procedure Laterality Date    DIRECT LARYNGOBRONCHOSCOPY N/A 12/27/2021    Procedure: LARYNGOSCOPY, DIRECT, WITH BRONCHOSCOPY;  Surgeon: Flex Espinosa MD;  Location: [facility] OR [address] [place] [address];  Service: ENT;  Laterality: N/A;    DIRECT LARYNGOSCOPY  11/9/2022    Procedure: LARYNGOSCOPY, DIRECT;  Surgeon: Jesse James MD;  Location: [address];  Service: ENT;;    DISSECTION OF NECK Bilateral 1/6/2022    Procedure: DISSECTION, NECK;  Surgeon: Jesse James MD;  Location: [facility] OR [address] [place] [address];  Service: ENT;  Laterality: Bilateral;    DISSECTION OF NECK Bilateral 11/9/2022    Procedure: DISSECTION, NECK;  Surgeon: Jesse James MD;  Location: [address];  Service: ENT;  Laterality: Bilateral;    FLAP PROCEDURE Right 1/6/2022    Procedure: CREATION, FREE FLAP;  Surgeon: Alise Hart MD;  Location: [address];  Service: ENT;  Laterality: Right;  Ischemic start 1351  Ischemic stop 1502    FLAP PROCEDURE Left 11/9/2022    Procedure: CREATION, FREE  FLAP, ALT;  Surgeon: Alise Hart MD;  Location: [facility] OR 2ND FLR;  Service: ENT;  Laterality: Left;    GLOSSECTOMY Bilateral 11/9/2022    Procedure: TOTAL GLOSSECTOMY;  Surgeon: Jesse James MD;  Location: [facility] OR 2ND FLR;  Service: ENT;  Laterality: Bilateral;    INSERTION OF TUNNELED CENTRAL VENOUS CATHETER (CVC) WITH SUBCUTANEOUS PORT N/A 6/9/2022    Procedure: [id number];  Surgeon: Jesus Viera MD;  Location: [facility] OR;  Service: General;  Laterality: N/A;    INSERTION OF TUNNELED CENTRAL VENOUS CATHETER (CVC) WITH SUBCUTANEOUS PORT Right 1/12/2023    Procedure: [id number];  Surgeon: Abdulaziz Le Jr., MD;  Location: [facility] OR;  Service: General;  Laterality: Right;    LARYNGECTOMY N/A 1/6/2022    Procedure: LARYNGECTOMY;  Surgeon: Jesse James MD;  [facility];  Service: ENT;  Laterality: N/A;    LARYNGOSCOPY N/A 8/4/2020    Procedure: Suspension microlaryngoscopy with biopsy, possible KTP laser treatment/excision;  Surgeon: Stew Noel MD;  [facility];  Service: ENT;  Laterality: N/A;  Microscope, telescopes, tower, microinstruments, KTP laser, rep conf# [id number] IC 7/28.    LARYNGOSCOPY N/A 3/16/2021    Procedure: Suspension microlaryngoscopy with excision of lesion, possible CO2 laser;  Surgeon: Stew Noel MD;  [facility];  Service: ENT;  Laterality: N/A;  Microscope, telescopes, tower, microinstruments, CO2 laser, rep conf# [id number] IC 3/4.    LARYNGOSCOPY N/A 4/1/2021    Procedure: Suspension microlaryngoscopy with KTP laser excision of lesion;  Surgeon: Stew Noel MD;  Location: [facility] OR 2ND FLR;  Service: ENT;  Laterality: N/A;  Microscope, telescopes, tower, microinstruments, 70 degree scope, vocal fold , KTP laser, rep conf# [id number] BC    LARYNGOSCOPY N/A 12/9/2021    Procedure: Suspension microlaryngoscopy with biopsy;  Surgeon: Stew Noel MD;  Location: [facility] OR [facility]  FLR;  Service: ENT;  Laterality: N/A;  Microscope, telescopes, tower, microinstruments    LARYNGOSCOPY N/A 1/6/2022    Procedure: LARYNGOSCOPY;  Surgeon: Jesse James MD;  Location: [facility] OR 2ND FLR;  Service: ENT;  Laterality: N/A;    LARYNGOSCOPY N/A 4/27/2022    Procedure: LARYNGOSCOPY WITH BIOPSY;  Surgeon: Jesse James MD;  Location: [facility] OR 2ND FLR;  Service: ENT;  Laterality: N/A;    MICROLARYNGOSCOPY N/A 3/17/2020    Procedure: MICROLARYNGOSCOPY;  Surgeon: Jung Xiao MD;  Location: [facility] OR;  Service: ENT;  Laterality: N/A;  Laser Microlaryngoscopy  NEED TO SCHEDULE LASER from [facility] [id number]    PHARYNGECTOMY  11/9/2022    Procedure: TOTAL PHARYNGECTOMY;  Surgeon: Jesse James MD;  Location: [facility] OR 2ND FLR;  Service: ENT;;    REIMPLANTATION OF PARATHYROID TISSUE N/A 1/6/2022    Procedure: REIMPLANTATION, PARATHYROID TISSUE;  Surgeon: Jesse James MD;  Location: [facility] OR 2ND FLR;  Service: ENT;  Laterality: N/A;    SKIN SPLIT GRAFT Right 11/9/2022    Procedure: APPLICATION, GRAFT, SKIN, SPLIT-THICKNESS;  Surgeon: Jesse James MD;  Location: [facility] OR 2ND FLR;  Service: ENT;  Laterality: Right;    THYROIDECTOMY  1/6/2022    Procedure: THYROIDECTOMY;  Surgeon: Jesse James MD;  Location: [facility] OR [facility] FLR;  Service: ENT;;    TRACHEOSTOMY N/A 12/27/2021    Procedure: CREATION, TRACHEOSTOMY;  Surgeon: Flex Espinosa MD;  Location: [facility] OR 2ND FLR;  Service: ENT;  Laterality: N/A;         Family History   Family History   Problem Relation Age of Onset    Abnormal EKG Mother     Diabetes Father     Heart disease Father     Hypertension Father            Social History   .  Social History     Socioeconomic History    Marital status:      Spouse name: Janel Batres    Number of children: 2   Occupational History    Occupation: [employer]     Employer: [employer]   Tobacco Use    Smoking status: Never    Smokeless tobacco: Never   Substance and Sexual  Activity    Alcohol use: Not Currently     Comment: occasional    Drug use: No    Sexual activity: Yes     Partners: Female   Social History Narrative    ** Merged History Encounter **         2 children from his 1st wife     Social Determinants of Health     Financial Resource Strain: Low Risk     Difficulty of Paying Living Expenses: Not hard at all   Food Insecurity: No Food Insecurity    Worried About Running Out of Food in the Last Year: Never true    Ran Out of Food in the Last Year: Never true   Transportation Needs: No Transportation Needs    Lack of Transportation (Medical): No    Lack of Transportation (Non-Medical): No   Physical Activity: Insufficiently Active    Days of Exercise per Week: 1 day    Minutes of Exercise per Session: 30 min   Stress: Stress Concern Present    Feeling of Stress : To some extent   Social Connections: Socially Isolated    Frequency of Communication with Friends and Family: Once a week    Frequency of Social Gatherings with Friends and Family: Once a week    Attends Hindu Services: Never    Active Member of Clubs or Organizations: No    Attends Club or Organization Meetings: Never    Marital Status:    Housing Stability: Low Risk     Unable to Pay for Housing in the Last Year: No    Number of Places Lived in the Last Year: 1    Unstable Housing in the Last Year: No         Allergies   Review of patient's allergies indicates:   Allergen Reactions    Lovastatin Itching    Pollen extracts     Lovastatin Rash     Not confirmed but pt skeptical           Physical Exam     There were no vitals filed for this visit.          Body mass index is 21.21 kg/m².      General: AOx3, NAD   Respiratory:  Symmetric chest rise, normal effort  Oral Cavity:  Flap healthy. No worrisome lesions.   Oropharynx:  No masses/lesions of the posterior pharyngeal wall. Tonsillar fossa without lesions. Soft palate without masses. Midline uvula.   Neck: Stoma widely patent. Skin paddle healthy with  good color and turgor.Well-healed neck incisions.No LAD. Moderate post-op, post-treatment fibrosis. Submental neck diffusely full.  Face: House Brackmann I bilaterally.    Neopharynx, Trachea: Clear on scope.      Assessment/Plan  Problem List Items Addressed This Visit          Oncology    Larynx cancer    Malignant neoplasm of base of tongue - Primary     ZAY.  RTC 2 mos.

## 2023-06-06 NOTE — PATIENT INSTRUCTIONS
What are floaters?     Floaters look like small specks, dots, circles, lines or cobwebs in your field of vision. While they seem to be in front of your eye, they are floating inside. Floaters are tiny clumps of gel or cells inside the vitreous that fills your eye. What you see are the shadows these clumps cast on your retina.    You usually notice floaters when looking  at something plain, like a blank wall or a blue fidencio.    As we age, our vitreous starts to thicken or shrink. Sometimes clumps or strands form in the vitreous. If the vitreous pulls away from the back of the eye, it is called posterior vitreous detachment. Floaters usually happen with posterior vitreous detachment. They are not serious, and they tend to fade or go away over time. Severe floaters can be removed by surgery, but this has risks and is seldom necessary.    You are more likely to get floaters if you:    are nearsighted (you need glasses to see far away)    have had surgery for cataracts    have had inflammation (swelling) inside the eye      What are flashes?     Flashes can look like flashing lights or lightning streaks in your field of vision. Some people compare them to seeing stars after being hit on the head. You might see flashes on and off for weeks, or even months. Flashes happen when the vitreous rubs or pulls on your retina.    As people age, it is common to see flashes occasionally.       When floaters and flashes are serious     Most floaters and flashes are not a problem. However, there are times when they can be signs of a serious condition. Here is when you should call an ophthalmologist right away:    you notice a lot of new floaters    you have a lot of flashes    a shadow appears in your peripheral (side) vision    a gray curtain covers part of  your vision    These floaters and flashes could be symptoms of a torn or detached retina. This is when the retina pulls away from the back of your eye. This is a serious  condition that needs to be treated.    Summary    Floaters are dark specks or dots in your field of vision. They are shadows you see from clumps of vitreous gel in your eye. Flashes are flashes of light that look like lightning streaks in your field of vision. Flashes occur when the vitreous gel rubs or pulls on your retina.    Floaters and flashes are quite common as people age. However, they can be signs of a retinal detachment, which is a serious problem. If you suddenly have a lot of floaters and see flashes, and you notice changes in your vision, call your ophthalmologist right away.

## 2023-06-06 NOTE — PROGRESS NOTES
HPI    Pt presents for cataract eval, per Dr Pham     States blurred vision OU- Not currently driving at night.     No ocular meds       Last edited by Dana Gibbons on 6/6/2023  2:58 PM.            Assessment /Plan     For exam results, see Encounter Report.    Combined forms of age-related cataract, bilateral  -     Ambulatory referral/consult to Ophthalmology    Posterior vitreous detachment of both eyes    Diabetes mellitus type 2 without retinopathy    OAG (open angle glaucoma) suspect, low risk, bilateral    Diplopia      1. Combined forms of age-related cataract, bilateral  OD>OS with PSC  Patient with visually significant cataract impacting abiliity ADLs (reading, driving, PM driving, glare).  Discussed options, R & B, expectations, patient voices good understanding and wishes to proceed with procedure. Patient will likely benefit from sx.    Schedule CEIOL OD then OS  Pt declines toric at this time  RTC preop    2. Posterior vitreous detachment of both eyes  RD precautions    3. Diabetes mellitus type 2 without retinopathy  Diabetes without retinopathy, discussed with patient importance of glucose control and follow up.  Patient voices understanding.    4. OAG (open angle glaucoma) suspect, low risk, bilateral  +famhx  Avg pachy    IOP normal  ONH mildly suspicious  Previous testing normal    Low risk  Monitor off meds    5. Diplopia  Long-standing  Will need prism post sx

## 2023-06-07 ENCOUNTER — CLINICAL SUPPORT (OUTPATIENT)
Dept: REHABILITATION | Facility: HOSPITAL | Age: 73
End: 2023-06-07
Payer: MEDICARE

## 2023-06-07 DIAGNOSIS — R29.898 DECREASED ROM OF NECK: ICD-10-CM

## 2023-06-07 DIAGNOSIS — I89.0 LYMPHEDEMA DUE TO RADIATION: Primary | ICD-10-CM

## 2023-06-07 DIAGNOSIS — L90.5 SCAR CONDITIONS AND FIBROSIS OF SKIN: ICD-10-CM

## 2023-06-07 PROCEDURE — 97140 MANUAL THERAPY 1/> REGIONS: CPT | Mod: PN

## 2023-06-09 NOTE — PROGRESS NOTES
[facility]   Physical Therapy Treatment Note   Lymphedema Upper Extremity    Name: Cyrus Batres Jr.  Clinic Number: [id number]    Therapy Diagnosis:   Encounter Diagnoses   Name Primary?    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  He has been using the heavier foam chips pad under his swell spot and continuing with kinesiotaping since prior visit.  He believes that the taping has helped decrease the fluid load under his chin.  He has complaint of tension to the right SCM.  He has ongoing increased salivation.     He had his PET scan and gave a thumbs up when asked about the results.  He stated that his doctor recommended that he continue PT through August, additional appointments will be scheduled.    Nicolás reported wearing compression outside of therapy visits: Yes - to anterior neck as instructed at initial evaluation  Nicolás was compliant with home exercise program.    Response to previous treatment: excellent  Functional change: improvement in skin quality and cervical spine range of motion     Pain: 0/10  Location: bilateral anterior neck      OBJECTIVE     Girth Measurements:     Girth Measurements (in centimeters)  LANDMARK Head/Neck Measurement Face  Right Face Left    Diagonal Head Circumference 55       Vertical Submental Circumference         Mandibular Angle to Mandibular Angle 27.5       Tragus Angle to Tragus  "Angle 32.0       Neck Superior 37.5       Neck Middle 34.0       Neck Inferior  34.5       Tragus to Mental Protuberance         Tragus to Mouth Angle         Mandibular Angle to Nasal Wing         Mandibular Angle to Internal Eye Corner         Mandibular Angle to External Eye Corner         Mental Protuberance to Internal Eye Corner         Mandibular Angle to Mental Protuberance         Totals         Total girth measurement-   220.5 cm       Total Girth Reduction                   Treatment     Nicolás received the treatments listed below:      Patient received from waiting room.     manual therapy techniques: Complete Decongestive Therapy was applied to the: head and neck for 45 minutes, including: manual lymphatic drainage and short stretch compression bandaging     MANUAL LYMPHATIC DRAINAGE:   Short neck  Prom with end range of motion prolonged passive stretch to anterior chest, shoulder depression  Grade 2-3 depression mobilizations to the first rib, right and left   Long neck  Anterior neck routed posterior to the radiation therapy zone and then to terminus  Fibrosis techniques to scar and fibrosis at anterior neck/radiation therapy zone - bulk of treatment  Mobilization,  cartilages in anterior neck and trachea  S deformation and elongation of SCM and anterior strap muscle  PPS to left and right SCM with overpressure  Redo long neck  Deep breathing  Grade 2-3 mobilizations to increase accessory mobility to left and right first rib depression, PA mobilizations T3-C2 - all x 10 repetitions ea      Kinesiotaping to submental, left of center anterior neck, above trach, using open herlinda pattern.  "I" strip place along clavicular SCM with ~75% stretch to attempt to decrease resting tone and tension.  Reviewed wear schedule and precautions with taping.  He was instructed to remove tomorrow and cleanse his skin prior to the PET scan.  He was given additional kinesiotape for home application until next visit, with " verbal review of application and skin care.  He nodded understanding.    therapeutic exercises to develop ROM, flexibility, and posture for 2 minutes including:   Active assisted range of motion -> resisted range of motion  cervical retraction for mobilization of anterior neck soft tissues.    Picture of anterior neck, with and with/o kinesiotape,  in supine, 05- treatment; taken via [employer] on therapist's cell phone with verbal consent from patient:          Patient Education and Home Exercises     therapeutic activities to improve functional performance for 0  minutes, including:    Garments:  None - will consider Versiface swell spot for neck - information given 05-  Garments Ordered: No    Home Exercises and Patient Education Provided     Education provided:   - Educated in self massage to abdominal areas, and cervical region  - Patient to leave short-stretch compression bandages intact for 12-24 hours as tolerated, discontinue with any problems, return rolled bandages next session. Wash and wear schedules confirmed.   - Continue HEP of AROM, stretching, and postural correction.   - Educated on self or assisted bandaging, compression options, and risk reduction    Written Home Exercises Provided: Patient instructed to cont prior HEP. Exercises were reviewed and Nicolás was able to demonstrate them prior to the end of the session.  Nicolás demonstrated good  understanding of the education provided. See EMR under Patient Instructions for exercises provided during therapy sessions    ASSESSMENT     Nicolás had (+) response to manual therapy during treatment, yet he demonstrated continued rebound in his lymphedema. His skin quality has improved, evidenced by decreased adherence to basement layer and decreased puckering at midline at graft site.  He appears to have great carry over with home plan of care recommendations.      Nicolás Is progressing well towards his goals.   Pt prognosis is Good.      Patient will continue to benefit from skilled outpatient physical therapy to address the deficits listed in the problem list box on initial evaluation, provide patient/family education and to maximize patient's level of independence in the home and community environment.     Patient's spiritual, cultural and educational needs considered and patient agreeable to plan of care and goals.     Anticipated barriers to physical therapy: severity of fibrosis and scarring    Goals:   The following goals were discussed with the patient and patient is in agreement with them as to be addressed in the treatment plan.      Short Term Goals: (6 weeks)   Goals: Progressing / MET Date Established Date Assessed   1. Patient will demonstrate decreased localized lymphedema to the jawline by  palpation and visualization of improved symmetry left and right  PROGRESSING 05-     2. Patient will demonstrate 100% knowledge of lymphedema precautions and signs of infection to allow for reduced lymphedema risk, infection risk, and/or exacerbation of condition.  PROGRESSING 05-  MET 05-   3. Patient or caregiver will perform self-bandaging techniques and/or wearing of compression garments to allow for lymphatic drainage support, skin elasticity, and reduction in shape and size of limb.  PROGRESSING 05-  MET 05-   4. Patient will perform self lymph drainage techniques to areas within reach to enhance lymphatic drainage and skin condition.  PROGRESSING 05-  MET 05-   5. Patient will tolerate daily activities with external foam chips compression/ bandaging to allow for lymphatic and venous support.  PROGRESSING 05-  MET 05-      Long Term Goals: (12  weeks)   Goals: Progressing / MET Date Established Date Assessed   1. Patient will show decreased total girth measurement in sum of measurements to head and neck by up to 2 cm  to allow for improved comfort. PROGRESSING 05-      2. Patient will show reduction in density to mild/moderate or less with improved contour of limb to allow for cosmesis, symmetry, infection risk reduction, excursion of sift tissues for swallowing to clear secretions to protect the airway PROGRESSING 05-     3. Patient to malinda/doff compression garment with daily compliance to assist in lymphedema management, skin elasticity, and tissue density PROGRESSING 05-  MET 05-   4. Patient to show improved postural awareness and alignment. PROGRESSING 05-     5. Patient to be independent and compliant with home exercise program to allow for increased function in affected limb. PROGRESSING 05-         PLAN     Continue Physical Therapy  2x weekly for 6 weeks Complete Decongestive Therapy:  Manual lymphatic drainage, Multilayered short stretch bandaging, Pneumatic compression, Therapeutic exercises, and Patient education as deemed necessary to achieve stated goals.    Patient to schedule additional treatments through July at provider's request - completed    Ena Mott, PT CLT   06-

## 2023-06-12 ENCOUNTER — CLINICAL SUPPORT (OUTPATIENT)
Dept: REHABILITATION | Facility: HOSPITAL | Age: 73
End: 2023-06-12
Payer: MEDICARE

## 2023-06-12 DIAGNOSIS — I89.0 LYMPHEDEMA DUE TO RADIATION: Primary | ICD-10-CM

## 2023-06-12 DIAGNOSIS — R29.898 DECREASED ROM OF NECK: ICD-10-CM

## 2023-06-12 DIAGNOSIS — L90.5 SCAR CONDITIONS AND FIBROSIS OF SKIN: ICD-10-CM

## 2023-06-12 PROCEDURE — 97140 MANUAL THERAPY 1/> REGIONS: CPT | Mod: PN

## 2023-06-12 PROCEDURE — 97530 THERAPEUTIC ACTIVITIES: CPT | Mod: PN

## 2023-06-12 NOTE — PROGRESS NOTES
[facility]   Physical Therapy Treatment Note   Lymphedema Upper Extremity    Name: Cyrus Batres Jr.  Clinic Number: [id number]    Therapy Diagnosis:   No diagnosis found.    Physician: Matheus Portillo Jr., MD    Visit Date: 6/12/2023    Physician Orders: PT Eval and Treat   Medical Diagnosis from Referral: Malignant neoplasm of base of tongue  - Primary   Evaluation Date: 5/8/2023  Authorization Period Expiration: 06-  Plan of Care Expiration: 08-  Progress Note Due: v 10  Visit # / Visits authorized: 9/ 10   FOTO: not completed       Time In: 1500  Time Out: 1550  Total Billable Time: 50 minutes    SUBJECTIVE     Nicolás reports: He did well after last appointment.  He believes that the taping has helped decrease the fluid load under his chin, more than the compression.  He has complaint of tension to the right SCM, noting that he has cramping at times.    Nicolás reported wearing compression outside of therapy visits: Yes - to anterior neck as instructed at initial evaluation  Nicolás was compliant with home exercise program.    Response to previous treatment: excellent  Functional change: improvement in skin quality and cervical spine range of motion     Pain: 0/10  Location: bilateral anterior neck      OBJECTIVE     Girth Measurements:     Girth Measurements (in centimeters)  LANDMARK Head/Neck Measurement Face  Right Face Left    Diagonal Head Circumference 55       Vertical Submental Circumference         Mandibular Angle to Mandibular Angle 27.5       Tragus Angle to Tragus Angle 32.0       Neck Superior 37.5       Neck Middle 34.0       Neck Inferior  34.5       Tragus to Mental Protuberance         Tragus to Mouth Angle         Mandibular Angle to Nasal Wing         Mandibular Angle to Internal Eye Corner         Mandibular Angle to External Eye Corner         Mental Protuberance to Internal Eye Corner         Mandibular Angle to Mental Protuberance         Totals          Total girth measurement-   220.5 cm       Total Girth Reduction                   Treatment     Nicolás received the treatments listed below:      Patient received from waiting room.     manual therapy techniques: Complete Decongestive Therapy was applied to the: head and neck for 40 minutes, including: manual lymphatic drainage and short stretch compression bandaging     MANUAL LYMPHATIC DRAINAGE:   Short neck  Prom with end range of motion prolonged passive stretch to anterior chest, shoulder depression  Grade 2-3 depression mobilizations to the first rib, right and left   Long neck  Anterior neck routed posterior to the radiation therapy zone and then to terminus  Fibrosis techniques to scar and fibrosis at anterior neck/radiation therapy zone - bulk of treatment  Mobilization,  cartilages in anterior neck and trachea  S deformation and elongation of SCM and anterior strap muscle  PPS to left and right SCM with overpressure  Redo long neck  Deep breathing  Grade 2-3 mobilizations to increase accessory mobility to left and right first rib depression, PA mobilizations T3-C2 - all x 10 repetitions each - Not completed  Kinesiotaping as described below    therapeutic exercises to develop ROM, flexibility, and posture for 10 minutes including:     therapeutic activities to improve functional performance for 8  minutes, including:    Picture of anterior neck, with  kinesiotape, basket-weave pattern with lower piece along SCM,  in supine, 06- treatment; Reviewed wear schedule and precautions with taping.  He was given additional kinesiotape for home application until next visit, with verbal review of application and skin care.  He nodded understanding.    Taken via [employer] on therapist's cell phone with verbal consent from patient:          06-:   Patient was demonstrated and practices lateral cervical muscle stretch, with one hand anchored under thigh and the other to provide overpressure.  He was  instructed to perform with submax tension, and to hold for 90 seconds.    Patient Education and Home Exercises       Home Exercises and Patient Education Provided     Education provided:   - Educated in self massage to abdominal areas, and cervical region  - Patient to leave short-stretch compression bandages intact for 12-24 hours as tolerated, discontinue with any problems, return rolled bandages next session. Wash and wear schedules confirmed.   - Continue HEP of AROM, stretching, and postural correction.   - Educated on self or assisted bandaging, compression options, and risk reduction    06-:   Patient was demonstrated and practices lateral cervical muscle stretch, with one hand anchored under thigh and the other to provide overpressure.  He was instructed to perform with submax tension, and to hold for 90 seconds.    Written Home Exercises Provided: Patient instructed to cont prior HEP. Exercises were reviewed and Nicolás was able to demonstrate them prior to the end of the session.  Nicolás demonstrated good  understanding of the education provided. See EMR under Patient Instructions for exercises provided during therapy sessions    ASSESSMENT     Nicolás had (+) response to manual therapy.  His skin quality has improved, evidenced by decreased adherence to basement layer and decreased puckering at midline at graft site.  He appears to have great carry over with home plan of care recommendations.      Nicolás Is progressing well towards his goals.   Pt prognosis is Good.     Patient will continue to benefit from skilled outpatient physical therapy to address the deficits listed in the problem list box on initial evaluation, provide patient/family education and to maximize patient's level of independence in the home and community environment.     Patient's spiritual, cultural and educational needs considered and patient agreeable to plan of care and goals.     Anticipated barriers to physical therapy:  severity of fibrosis and scarring    Goals:   The following goals were discussed with the patient and patient is in agreement with them as to be addressed in the treatment plan.      Short Term Goals: (6 weeks)   Goals: Progressing / MET Date Established Date Assessed   1. Patient will demonstrate decreased localized lymphedema to the jawline by  palpation and visualization of improved symmetry left and right  PROGRESSING 05-     2. Patient will demonstrate 100% knowledge of lymphedema precautions and signs of infection to allow for reduced lymphedema risk, infection risk, and/or exacerbation of condition.  PROGRESSING 05-  MET 05-   3. Patient or caregiver will perform self-bandaging techniques and/or wearing of compression garments to allow for lymphatic drainage support, skin elasticity, and reduction in shape and size of limb.  PROGRESSING 05-  MET 05-   4. Patient will perform self lymph drainage techniques to areas within reach to enhance lymphatic drainage and skin condition.  PROGRESSING 05-  MET 05-   5. Patient will tolerate daily activities with external foam chips compression/ bandaging to allow for lymphatic and venous support.  PROGRESSING 05-  MET 05-      Long Term Goals: (12  weeks)   Goals: Progressing / MET Date Established Date Assessed   1. Patient will show decreased total girth measurement in sum of measurements to head and neck by up to 2 cm  to allow for improved comfort. PROGRESSING 05-     2. Patient will show reduction in density to mild/moderate or less with improved contour of limb to allow for cosmesis, symmetry, infection risk reduction, excursion of sift tissues for swallowing to clear secretions to protect the airway PROGRESSING 05-     3. Patient to malinda/doff compression garment with daily compliance to assist in lymphedema management, skin elasticity, and tissue density PROGRESSING 05-  MET  05-   4. Patient to show improved postural awareness and alignment. PROGRESSING 05-     5. Patient to be independent and compliant with home exercise program to allow for increased function in affected limb. PROGRESSING 05-         PLAN     Continue Physical Therapy  2x weekly for 6 weeks Complete Decongestive Therapy:  Manual lymphatic drainage, Multilayered short stretch bandaging, Pneumatic compression, Therapeutic exercises, and Patient education as deemed necessary to achieve stated goals.    REASSESS AND RE-MEASURE    Ena Mott PT CLT   06-

## 2023-06-14 ENCOUNTER — CLINICAL SUPPORT (OUTPATIENT)
Dept: REHABILITATION | Facility: HOSPITAL | Age: 73
End: 2023-06-14
Payer: MEDICARE

## 2023-06-14 DIAGNOSIS — R29.898 DECREASED ROM OF NECK: ICD-10-CM

## 2023-06-14 DIAGNOSIS — I89.0 LYMPHEDEMA DUE TO RADIATION: Primary | ICD-10-CM

## 2023-06-14 DIAGNOSIS — L90.5 SCAR CONDITIONS AND FIBROSIS OF SKIN: ICD-10-CM

## 2023-06-14 PROCEDURE — 97140 MANUAL THERAPY 1/> REGIONS: CPT | Mod: PN

## 2023-06-14 NOTE — PROGRESS NOTES
[facility]   Physical Therapy Treatment Note   Lymphedema - Head and Neck    PROGRESS NOTE VISIT 10    Name: Cyrus Batres Jr.  Clinic Number: [id number]    Therapy Diagnosis:   Encounter Diagnoses   Name Primary?    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    Scar conditions and fibrosis of skin     Decreased ROM of neck        Physician: Matheus Portillo Jr., MD    Visit Date: 6/14/2023    Physician Orders: PT Eval and Treat   Medical Diagnosis from Referral: Malignant neoplasm of base of tongue  - Primary   Evaluation Date: 5/8/2023  Authorization Period Expiration: 06-  Plan of Care Expiration: 08-  Progress Note Due: v 10  Visit # / Visits authorized: 10/ 10   FOTO: not completed       Time In: 1500  Time Out: 1550  Total Billable Time: 50 minutes    SUBJECTIVE     Nicolás reports: He felt that the taping too his neck was too broad, requested to use a narrower piece of tape today.   He continues to have tense and stabbing sensation to the angle of his right jaw <-> mastoid along the SCM. He has been using compression, doing his home exercise program and using taping to manage his symptoms.  He reports decreased symptoms after PT, he has rebound fluid and fibrosis increase between visits.     He states that he has been doing his neck muscle stretches with overpressure daily.     Nicolás reported wearing compression outside of therapy visits: Yes - to anterior neck as instructed at initial evaluation  Nicolás was compliant with home exercise program.    Response to previous treatment: excellent  Functional change: improvement in skin quality and cervical spine range of motion     Pain: 0/10  Location: bilateral anterior neck      OBJECTIVE     Girth Measurements:     Girth Measurements (in centimeters)  LANDMARK Head/Neck Measurement Face  Right Face Left    Diagonal Head Circumference 55       Vertical Submental Circumference         Mandibular Angle to Mandibular Angle 27.5        Tragus Angle to Tragus Angle 32.0       Neck Superior 37.5       Neck Middle 34.0       Neck Inferior  34.5       Tragus to Mental Protuberance         Tragus to Mouth Angle         Mandibular Angle to Nasal Wing         Mandibular Angle to Internal Eye Corner         Mandibular Angle to External Eye Corner         Mental Protuberance to Internal Eye Corner         Mandibular Angle to Mental Protuberance         Totals         Total girth measurement-   220.5 cm       Total Girth Reduction                   Treatment     Nicolás received the treatments listed below:      Patient received from waiting room.     Palpation/Inspection:  Improved skin glide throughout, noted softening of the fibrotic tissues under his chin and along the jaw line.    He has increased active range of motion  to his cervical spine, improving his capacity to look over his shoulder and overhead.      manual therapy techniques: Complete Decongestive Therapy was applied to the: head and neck for 40 minutes, including: manual lymphatic drainage and short stretch compression bandaging     MANUAL LYMPHATIC DRAINAGE:   Short neck  Prom with end range of motion prolonged passive stretch to anterior chest, shoulder depression  Grade 2-3 depression mobilizations to the first rib, right and left   Long neck  Anterior neck routed posterior to the radiation therapy zone and then to terminus  Fibrosis techniques to scar and fibrosis at anterior neck/radiation therapy zone - bulk of treatment  Mobilization,  cartilages in anterior neck and trachea  S deformation and elongation of SCM and anterior strap muscle  PPS to left and right SCM with overpressure  Redo long neck  Deep breathing  Grade 2-3 mobilizations to increase accessory mobility to left and right first rib depression, PA mobilizations T3-C2 - all x 10 repetitions each - Not completed  Gentle cupping with small rigid cups - used pulsating  technique to increase soft tissue extensibility along  the left and right jaw line, with care to avoid anterior neck over vessels.  Kinesiotaping as described below, using 25% less tape for his comfort      06-:   Patient was demonstrated and practices lateral cervical muscle stretch, with one hand anchored under thigh and the other to provide overpressure.  He was instructed to perform with submax tension, and to hold for 90 seconds.    Patient Education and Home Exercises       Home Exercises and Patient Education Provided     Education provided:   - Educated in self massage to abdominal areas, and cervical region  - Patient to leave short-stretch compression bandages intact for 12-24 hours as tolerated, discontinue with any problems, return rolled bandages next session. Wash and wear schedules confirmed.   - Continue HEP of AROM, stretching, and postural correction.   - Educated on self or assisted bandaging, compression options, and risk reduction    06-:   Patient was demonstrated and practices lateral cervical muscle stretch, with one hand anchored under thigh and the other to provide overpressure.  He was instructed to perform with submax tension, and to hold for 90 seconds.    Written Home Exercises Provided: Patient instructed to cont prior HEP. Exercises were reviewed and Nicolás was able to demonstrate them prior to the end of the session.  Nicolás demonstrated good  understanding of the education provided. See EMR under Patient Instructions for exercises provided during therapy sessions    ASSESSMENT     Nicolás had (+) response to manual therapy.  His skin quality has improved, evidenced by decreased adherence to basement layer and decreased puckering at midline at graft site.  His cervical spine active range of motion has improved and he is better able to look over his shoulder with driving, by his report.   He has decreased soft tissue tension throughout his anterior neck, his SCM and jawline have persistent radiation therapy fibrosis which  softens the manual therapy, but returns bw PT visits.   He has been compliant with his home exercise program and compression use.       Nicolás Is progressing well towards his goals.   Pt prognosis is Good.     Patient will continue to benefit from skilled outpatient physical therapy to address the deficits listed in the problem list box on initial evaluation, provide patient/family education and to maximize patient's level of independence in the home and community environment.     Patient's spiritual, cultural and educational needs considered and patient agreeable to plan of care and goals.     Anticipated barriers to physical therapy: severity of fibrosis and scarring    Goals:   The following goals were discussed with the patient and patient is in agreement with them as to be addressed in the treatment plan.      Short Term Goals: (6 weeks)   Goals: Progressing / MET Date Established Date Assessed   1. Patient will demonstrate decreased localized lymphedema to the jawline by  palpation and visualization of improved symmetry left and right  PROGRESSING 05-  PROGRESSING 06-   2. Patient will demonstrate 100% knowledge of lymphedema precautions and signs of infection to allow for reduced lymphedema risk, infection risk, and/or exacerbation of condition.  PROGRESSING 05-  MET 05-   3. Patient or caregiver will perform self-bandaging techniques and/or wearing of compression garments to allow for lymphatic drainage support, skin elasticity, and reduction in shape and size of limb.  PROGRESSING 05-  MET 05-   4. Patient will perform self lymph drainage techniques to areas within reach to enhance lymphatic drainage and skin condition.  PROGRESSING 05-  MET 05-   5. Patient will tolerate daily activities with external foam chips compression/ bandaging to allow for lymphatic and venous support.  PROGRESSING 05-  MET 05-      Long Term Goals: (12  weeks)    Goals: Progressing / MET Date Established Date Assessed   1. Patient will show decreased total girth measurement in sum of measurements to head and neck by up to 2 cm  to allow for improved comfort. PROGRESSING 05-   PROGRESSING 06-   2. Patient will show reduction in density to mild/moderate or less with improved contour of limb to allow for cosmesis, symmetry, infection risk reduction, excursion of sift tissues for swallowing to clear secretions to protect the airway PROGRESSING 05-   PROGRESSING 06-   3. Patient to malinda/doff compression garment with daily compliance to assist in lymphedema management, skin elasticity, and tissue density PROGRESSING 05-  MET 05-   4. Patient to show improved postural awareness and alignment. PROGRESSING 05-  MET 06-   5. Patient to be independent and compliant with home exercise program to allow for increased function in affected limb. PROGRESSING 05-         PLAN     Continue Physical Therapy  2x weekly for 6 weeks Complete Decongestive Therapy:  Manual lymphatic drainage, Multilayered short stretch bandaging, Pneumatic compression, Therapeutic exercises, and Patient education as deemed necessary to achieve stated goals.    2.  RE-MEASURE    3.  Use scar tool and negative pressure to progress fibrosis management    Ena Mott, PT CLT   06-

## 2023-06-15 ENCOUNTER — OFFICE VISIT (OUTPATIENT)
Dept: ENDOCRINOLOGY | Facility: CLINIC | Age: 73
End: 2023-06-15
Payer: MEDICARE

## 2023-06-15 VITALS
BODY MASS INDEX: 21.22 KG/M2 | HEIGHT: 66 IN | TEMPERATURE: 98 F | OXYGEN SATURATION: 98 % | WEIGHT: 132.06 LBS | DIASTOLIC BLOOD PRESSURE: 70 MMHG | HEART RATE: 82 BPM | SYSTOLIC BLOOD PRESSURE: 140 MMHG

## 2023-06-15 DIAGNOSIS — E11.65 TYPE 2 DIABETES MELLITUS WITH HYPERGLYCEMIA, WITH LONG-TERM CURRENT USE OF INSULIN: Primary | ICD-10-CM

## 2023-06-15 DIAGNOSIS — N52.9 ERECTILE DYSFUNCTION, UNSPECIFIED ERECTILE DYSFUNCTION TYPE: ICD-10-CM

## 2023-06-15 DIAGNOSIS — Z78.9 STATIN INTOLERANCE: ICD-10-CM

## 2023-06-15 DIAGNOSIS — E78.5 HYPERLIPIDEMIA, UNSPECIFIED HYPERLIPIDEMIA TYPE: ICD-10-CM

## 2023-06-15 DIAGNOSIS — E03.9 HYPOTHYROIDISM, UNSPECIFIED TYPE: ICD-10-CM

## 2023-06-15 DIAGNOSIS — Z79.4 TYPE 2 DIABETES MELLITUS WITH HYPERGLYCEMIA, WITH LONG-TERM CURRENT USE OF INSULIN: Primary | ICD-10-CM

## 2023-06-15 DIAGNOSIS — E83.51 HYPOCALCEMIA: ICD-10-CM

## 2023-06-15 DIAGNOSIS — I10 BENIGN HYPERTENSION: ICD-10-CM

## 2023-06-15 PROCEDURE — 1159F MED LIST DOCD IN RCRD: CPT | Mod: CPTII,S$GLB,, | Performed by: PHYSICIAN ASSISTANT

## 2023-06-15 PROCEDURE — 3061F NEG MICROALBUMINURIA REV: CPT | Mod: CPTII,S$GLB,, | Performed by: PHYSICIAN ASSISTANT

## 2023-06-15 PROCEDURE — 3078F DIAST BP <80 MM HG: CPT | Mod: CPTII,S$GLB,, | Performed by: PHYSICIAN ASSISTANT

## 2023-06-15 PROCEDURE — 3044F HG A1C LEVEL LT 7.0%: CPT | Mod: CPTII,S$GLB,, | Performed by: PHYSICIAN ASSISTANT

## 2023-06-15 PROCEDURE — 1160F PR REVIEW ALL MEDS BY PRESCRIBER/CLIN PHARMACIST DOCUMENTED: ICD-10-PCS | Mod: CPTII,S$GLB,, | Performed by: PHYSICIAN ASSISTANT

## 2023-06-15 PROCEDURE — 3061F PR NEG MICROALBUMINURIA RESULT DOCUMENTED/REVIEW: ICD-10-PCS | Mod: CPTII,S$GLB,, | Performed by: PHYSICIAN ASSISTANT

## 2023-06-15 PROCEDURE — 3008F PR BODY MASS INDEX (BMI) DOCUMENTED: ICD-10-PCS | Mod: CPTII,S$GLB,, | Performed by: PHYSICIAN ASSISTANT

## 2023-06-15 PROCEDURE — 1101F PR PT FALLS ASSESS DOC 0-1 FALLS W/OUT INJ PAST YR: ICD-10-PCS | Mod: CPTII,S$GLB,, | Performed by: PHYSICIAN ASSISTANT

## 2023-06-15 PROCEDURE — 99999 PR PBB SHADOW E&M-EST. PATIENT-LVL IV: ICD-10-PCS | Mod: PBBFAC,,, | Performed by: PHYSICIAN ASSISTANT

## 2023-06-15 PROCEDURE — 1159F PR MEDICATION LIST DOCUMENTED IN MEDICAL RECORD: ICD-10-PCS | Mod: CPTII,S$GLB,, | Performed by: PHYSICIAN ASSISTANT

## 2023-06-15 PROCEDURE — 99214 PR OFFICE/OUTPT VISIT, EST, LEVL IV, 30-39 MIN: ICD-10-PCS | Mod: S$GLB,,, | Performed by: PHYSICIAN ASSISTANT

## 2023-06-15 PROCEDURE — 3078F PR MOST RECENT DIASTOLIC BLOOD PRESSURE < 80 MM HG: ICD-10-PCS | Mod: CPTII,S$GLB,, | Performed by: PHYSICIAN ASSISTANT

## 2023-06-15 PROCEDURE — 3077F PR MOST RECENT SYSTOLIC BLOOD PRESSURE >= 140 MM HG: ICD-10-PCS | Mod: CPTII,S$GLB,, | Performed by: PHYSICIAN ASSISTANT

## 2023-06-15 PROCEDURE — 3288F FALL RISK ASSESSMENT DOCD: CPT | Mod: CPTII,S$GLB,, | Performed by: PHYSICIAN ASSISTANT

## 2023-06-15 PROCEDURE — 3044F PR MOST RECENT HEMOGLOBIN A1C LEVEL <7.0%: ICD-10-PCS | Mod: CPTII,S$GLB,, | Performed by: PHYSICIAN ASSISTANT

## 2023-06-15 PROCEDURE — 1126F AMNT PAIN NOTED NONE PRSNT: CPT | Mod: CPTII,S$GLB,, | Performed by: PHYSICIAN ASSISTANT

## 2023-06-15 PROCEDURE — 1160F RVW MEDS BY RX/DR IN RCRD: CPT | Mod: CPTII,S$GLB,, | Performed by: PHYSICIAN ASSISTANT

## 2023-06-15 PROCEDURE — 3077F SYST BP >= 140 MM HG: CPT | Mod: CPTII,S$GLB,, | Performed by: PHYSICIAN ASSISTANT

## 2023-06-15 PROCEDURE — 3288F PR FALLS RISK ASSESSMENT DOCUMENTED: ICD-10-PCS | Mod: CPTII,S$GLB,, | Performed by: PHYSICIAN ASSISTANT

## 2023-06-15 PROCEDURE — 1126F PR PAIN SEVERITY QUANTIFIED, NO PAIN PRESENT: ICD-10-PCS | Mod: CPTII,S$GLB,, | Performed by: PHYSICIAN ASSISTANT

## 2023-06-15 PROCEDURE — 99214 OFFICE O/P EST MOD 30 MIN: CPT | Mod: S$GLB,,, | Performed by: PHYSICIAN ASSISTANT

## 2023-06-15 PROCEDURE — 1101F PT FALLS ASSESS-DOCD LE1/YR: CPT | Mod: CPTII,S$GLB,, | Performed by: PHYSICIAN ASSISTANT

## 2023-06-15 PROCEDURE — 99999 PR PBB SHADOW E&M-EST. PATIENT-LVL IV: CPT | Mod: PBBFAC,,, | Performed by: PHYSICIAN ASSISTANT

## 2023-06-15 PROCEDURE — 3066F PR DOCUMENTATION OF TREATMENT FOR NEPHROPATHY: ICD-10-PCS | Mod: CPTII,S$GLB,, | Performed by: PHYSICIAN ASSISTANT

## 2023-06-15 PROCEDURE — 3066F NEPHROPATHY DOC TX: CPT | Mod: CPTII,S$GLB,, | Performed by: PHYSICIAN ASSISTANT

## 2023-06-15 PROCEDURE — 3008F BODY MASS INDEX DOCD: CPT | Mod: CPTII,S$GLB,, | Performed by: PHYSICIAN ASSISTANT

## 2023-06-15 RX ORDER — CALCIUM CARBONATE 1250 MG/5ML
SUSPENSION ORAL
Qty: 2300 ML | Refills: 12 | Status: SHIPPED | OUTPATIENT
Start: 2023-06-15 | End: 2023-09-21 | Stop reason: SDUPTHER

## 2023-06-15 NOTE — PROGRESS NOTES
CC: This 72 y.o. male presents for management of Diabetes Mellitus  and chronic conditions pending review including HTN, HLP.    HPI: was diagnosed with T2DM in . States his his appetite has decreased. 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 He finished radiation in 2020. Declines . Pt has had a thyroidectomy and laryngectomy for a laryngeal carcinoma. He cannot talk, uses a pad to write. Pt diagnosed w/ Tongue cancer and had a glossectomy in .   Has never been hospitalized r/t DM.  Family hx of DM: father  Fhx of thyroid disease: none  Denies missing doses of DM medication.   hypoglycemia at home: none  monitoring BG at home:  Fastin-104    Diet:   5 Glucerna daily.  No snacks. Avoids sugary beverages.     Exercise: PT 2x weekly for his neck. He is doing strengthening  exercises for his arms and legs.    CURRENT DM MEDS: none  Standards of Care:  Eye exam: Dr. Munoz  Podiatry exam: n/a  DE: never.     Taking ergo 2x weekly.    Hypothyroidism  S/p thyroidectomy for esophageal cancer  Recently changed dose to 100 mcg daily.  No fatigue, palpitations, diarrhea, tremors or constipation.    Hypocalcemia  +perioral numbness, bl hand numbness  Calcitriol 0.25 mLs qd  Calcium carbonate 1000 mg 4x daily    PMHx, PSHx: reviewed in epic.  Social Hx: no E/T use.    Wt Readings from Last 10 Encounters:   06/15/23 59.9 kg (132 lb 0.9 oz)   23 59.6 kg (131 lb 6.3 oz)   23 59.5 kg (131 lb 3.2 oz)   23 60.4 kg (133 lb 1.6 oz)   23 59 kg (130 lb)   23 58.9 kg (129 lb 13.6 oz)   23 58.2 kg (128 lb 3.2 oz)   23 58.2 kg (128 lb 3.2 oz)   23 58.3 kg (128 lb 8 oz)   23 57.7 kg (127 lb 3.3 oz)      ROS:   Gen: Appetite good, + weight gain (5 lbs), denies fatigue and weakness.  Skin: Skin is intact and heals well , no rashes, no hair changes  Eyes: Denies visual disturbances  Resp: no SOB or GRIFFIN, no cough  Cardiac: No  "palpitations, chest pain, no edema   GI: No nausea or vomiting, diarrhea, constipation, or abdominal pain.  /GYN: + difficulty with erections, No nocturia, burning or pain.   MS/Neuro: Denies numbness/ tingling in BLE; Gait steady, speech clear  Psych: Denies drug/ETOH abuse, no hx of depression.  Other systems: negative.    BP (!) 140/70 (BP Location: Left arm, Patient Position: Sitting, BP Method: Small (Manual))   Pulse 82   Temp 97.9 °F (36.6 °C) (Oral)   Ht 5' 6" (1.676 m)   Wt 59.9 kg (132 lb 0.9 oz)   SpO2 98%   BMI 21.31 kg/m²      PE:  GENERAL:  middle aged talkative male, hoarse, well developed, well nourished.  PSYCH: AAOx3, appropriate mood and affect, pleasant expression, conversant, appears relaxed, well groomed.   EYES: Conjunctiva, corneas clear  NECK: Supple, trachea midline,no thyromegaly or nodules  CHEST: Resp even and unlabored, CTA bilateral.  CARDIAC: RRR, S1, S2 heard, no murmurs  ABDOMEN: Soft, non-tender, non-distended   VASCULAR: DP pulses +2/4 bilaterally, no edema.  NEURO: Gait steady, CN ll-Xll grossly intact  SKIN: Skin warm and dry no acanthosis nigracans.  6/23  Foot Exam: no sores or macerations noted. Onychomycosis on nails bl.    Protective Sensation (w/ 10 gram monofilament):  Right: Intact  Left: Intact    Visual Inspection:  Normal -  Bilateral and Nails Intact - without Evidence of Foot Deformity- Bilateral    Pedal Pulses:   Right: Present  Left: Present    Posterior tibialis:   Right:Present  Left: Present     Vibratory Sensation  Right:Decreased  Left:Decreased    Personally reviewed labs below:    Hospital Outpatient Visit on 05/30/2023   Component Date Value Ref Range Status    POC Glucose 05/30/2023 113 (H)  70 - 110 Final   Lab Visit on 05/26/2023   Component Date Value Ref Range Status    WBC 05/26/2023 3.80 (L)  3.90 - 12.70 K/uL Final    RBC 05/26/2023 3.34 (L)  4.60 - 6.20 M/uL Final    Hemoglobin 05/26/2023 10.9 (L)  14.0 - 18.0 g/dL Final    Hematocrit " 05/26/2023 32.5 (L)  40.0 - 54.0 % Final    MCV 05/26/2023 97  82 - 98 fL Final    MCH 05/26/2023 32.6 (H)  27.0 - 31.0 pg Final    MCHC 05/26/2023 33.5  32.0 - 36.0 g/dL Final    RDW 05/26/2023 12.1  11.5 - 14.5 % Final    Platelets 05/26/2023 158  150 - 450 K/uL Final    MPV 05/26/2023 10.7  9.2 - 12.9 fL Final    Immature Granulocytes 05/26/2023 0.5  0.0 - 0.5 % Final    Gran # (ANC) 05/26/2023 2.6  1.8 - 7.7 K/uL Final    Immature Grans (Abs) 05/26/2023 0.02  0.00 - 0.04 K/uL Final    Comment: Mild elevation in immature granulocytes is non specific and   can be seen in a variety of conditions including stress response,   acute inflammation, trauma and pregnancy. Correlation with other   laboratory and clinical findings is essential.      Lymph # 05/26/2023 0.7 (L)  1.0 - 4.8 K/uL Final    Mono # 05/26/2023 0.3  0.3 - 1.0 K/uL Final    Eos # 05/26/2023 0.2  0.0 - 0.5 K/uL Final    Baso # 05/26/2023 0.01  0.00 - 0.20 K/uL Final    nRBC 05/26/2023 0  0 /100 WBC Final    Gran % 05/26/2023 68.3  38.0 - 73.0 % Final    Lymph % 05/26/2023 18.2  18.0 - 48.0 % Final    Mono % 05/26/2023 8.2  4.0 - 15.0 % Final    Eosinophil % 05/26/2023 4.5  0.0 - 8.0 % Final    Basophil % 05/26/2023 0.3  0.0 - 1.9 % Final    Differential Method 05/26/2023 Automated   Final    Sodium 05/26/2023 140  136 - 145 mmol/L Final    Potassium 05/26/2023 3.9  3.5 - 5.1 mmol/L Final    Chloride 05/26/2023 104  95 - 110 mmol/L Final    CO2 05/26/2023 28  23 - 29 mmol/L Final    Glucose 05/26/2023 113 (H)  70 - 110 mg/dL Final    BUN 05/26/2023 22  8 - 23 mg/dL Final    Creatinine 05/26/2023 1.0  0.5 - 1.4 mg/dL Final    Calcium 05/26/2023 8.8  8.7 - 10.5 mg/dL Final    Total Protein 05/26/2023 7.4  6.0 - 8.4 g/dL Final    Albumin 05/26/2023 4.1  3.5 - 5.2 g/dL Final    Total Bilirubin 05/26/2023 0.9  0.1 - 1.0 mg/dL Final    Comment: For infants and newborns, interpretation of results should be based  on gestational age, weight and in agreement  with clinical  observations.    Premature Infant recommended reference ranges:  Up to 24 hours.............<8.0 mg/dL  Up to 48 hours............<12.0 mg/dL  3-5 days..................<15.0 mg/dL  6-29 days.................<15.0 mg/dL      Alkaline Phosphatase 05/26/2023 253 (H)  55 - 135 U/L Final    AST 05/26/2023 35  10 - 40 U/L Final    ALT 05/26/2023 43  10 - 44 U/L Final    Anion Gap 05/26/2023 8  8 - 16 mmol/L Final    eGFR 05/26/2023 >60.0  >60 mL/min/1.73 m^2 Final   Lab Visit on 05/17/2023   Component Date Value Ref Range Status    Microalbumin, Urine 05/17/2023 18.0  ug/mL Final    Creatinine, Urine 05/17/2023 65.0  23.0 - 375.0 mg/dL Final    Microalb/Creat Ratio 05/17/2023 27.7  0.0 - 30.0 ug/mg Final   Lab Visit on 05/17/2023   Component Date Value Ref Range Status    Cholesterol 05/17/2023 162  120 - 199 mg/dL Final    Comment: The National Cholesterol Education Program (NCEP) has set the  following guidelines (reference ranges) for Cholesterol:  Optimal.....................<200 mg/dL  Borderline High.............200-239 mg/dL  High........................> or = 240 mg/dL      Triglycerides 05/17/2023 102  30 - 150 mg/dL Final    Comment: The National Cholesterol Education Program (NCEP) has set the  following guidelines (reference values) for triglycerides:  Normal......................<150 mg/dL  Borderline High.............150-199 mg/dL  High........................200-499 mg/dL      HDL 05/17/2023 56  40 - 75 mg/dL Final    Comment: The National Cholesterol Education Program (NCEP) has set the  following guidelines (reference values) for HDL Cholesterol:  Low...............<40 mg/dL  Optimal...........>60 mg/dL      LDL Cholesterol 05/17/2023 85.6  63.0 - 159.0 mg/dL Final    Comment: The National Cholesterol Education Program (NCEP) has set the  following guidelines (reference values) for LDL Cholesterol:  Optimal.......................<130 mg/dL  Borderline High...............130-159  mg/dL  High..........................160-189 mg/dL  Very High.....................>190 mg/dL      HDL/Cholesterol Ratio 05/17/2023 34.6  20.0 - 50.0 % Final    Total Cholesterol/HDL Ratio 05/17/2023 2.9  2.0 - 5.0 Final    Non-HDL Cholesterol 05/17/2023 106  mg/dL Final    Comment: Risk category and Non-HDL cholesterol goals:  Coronary heart disease (CHD)or equivalent (10-year risk of CHD >20%):  Non-HDL cholesterol goal     <130 mg/dL  Two or more CHD risk factors and 10-year risk of CHD <= 20%:  Non-HDL cholesterol goal     <160 mg/dL  0 to 1 CHD risk factor:  Non-HDL cholesterol goal     <190 mg/dL      Hemoglobin A1C 05/17/2023 5.1  4.0 - 5.6 % Final    Comment: ADA Screening Guidelines:  5.7-6.4%  Consistent with prediabetes  >or=6.5%  Consistent with diabetes    High levels of fetal hemoglobin interfere with the HbA1C  assay. Heterozygous hemoglobin variants (HbS, HgC, etc)do  not significantly interfere with this assay.   However, presence of multiple variants may affect accuracy.      Estimated Avg Glucose 05/17/2023 100  68 - 131 mg/dL Final    Sodium 05/17/2023 143  136 - 145 mmol/L Final    Potassium 05/17/2023 4.0  3.5 - 5.1 mmol/L Final    Chloride 05/17/2023 108  95 - 110 mmol/L Final    CO2 05/17/2023 27  23 - 29 mmol/L Final    Glucose 05/17/2023 100  70 - 110 mg/dL Final    BUN 05/17/2023 21  8 - 23 mg/dL Final    Creatinine 05/17/2023 0.9  0.5 - 1.4 mg/dL Final    Calcium 05/17/2023 8.6 (L)  8.7 - 10.5 mg/dL Final    Total Protein 05/17/2023 7.1  6.0 - 8.4 g/dL Final    Albumin 05/17/2023 4.0  3.5 - 5.2 g/dL Final    Total Bilirubin 05/17/2023 0.9  0.1 - 1.0 mg/dL Final    Comment: For infants and newborns, interpretation of results should be based  on gestational age, weight and in agreement with clinical  observations.    Premature Infant recommended reference ranges:  Up to 24 hours.............<8.0 mg/dL  Up to 48 hours............<12.0 mg/dL  3-5 days..................<15.0 mg/dL  6-29  days.................<15.0 mg/dL      Alkaline Phosphatase 05/17/2023 279 (H)  55 - 135 U/L Final    AST 05/17/2023 31  10 - 40 U/L Final    ALT 05/17/2023 34  10 - 44 U/L Final    Anion Gap 05/17/2023 8  8 - 16 mmol/L Final    eGFR 05/17/2023 >60.0  >60 mL/min/1.73 m^2 Final    Free T4 05/17/2023 1.04  0.71 - 1.51 ng/dL Final    TSH 05/17/2023 0.442  0.400 - 4.000 uIU/mL Final    Vit D, 25-Hydroxy 05/17/2023 40  30 - 96 ng/mL Final    Comment: Vitamin D deficiency.........<10 ng/mL                              Vitamin D insufficiency......10-29 ng/mL       Vitamin D sufficiency........> or equal to 30 ng/mL  Vitamin D toxicity............>100 ng/mL      PTH, Intact 05/17/2023 14.3  9.0 - 77.0 pg/mL Final    Ionized Calcium 05/17/2023 1.12  1.06 - 1.42 mmol/L Final       ASSESSMENT and PLAN:    1. Type 2 diabetes mellitus with hyperglycemia, with long-term current use of insulin  Hemoglobin A1C    PTH, Intact    Comprehensive Metabolic Panel    Calcium, Ionized    T4, Free    TSH      2. Benign hypertension        3. Hyperlipidemia, unspecified hyperlipidemia type        4. Statin intolerance        5. Erectile dysfunction, unspecified erectile dysfunction type        6. Hypothyroidism, unspecified type        7. Hypocalcemia  calcium carbonate 500 mg/5 mL (1,250 mg/5 mL)         T2DM with hyperglycemia- A1c is in the normal range. Diet controlled. Monitor. F/u w/ PCP. Discussed DM, progression of disease, long term complications, tx options.     - takes ASA, ARB, statin    HTN - controlled, continue ASA- monitor.   HLP - LDL , on statin therapy, LFTs WNL  Hypovitaminosis n-wsfmze-wgggrbmk ergocalciferol 2x weekly.  Statin intolerance-monitor cholesterol  ED- testosterone is wnl-monitor  Erulknohzbplqv-dmabtk-qwxdtmap -100 mcg qd. Seeing ENT.  Hypocalcemia-increase calcium carbonate to 20 mLs 4x daily. Stop calcitriol.     F/u 6 mths w/ labs prior

## 2023-06-19 ENCOUNTER — PATIENT MESSAGE (OUTPATIENT)
Dept: RADIATION ONCOLOGY | Facility: CLINIC | Age: 73
End: 2023-06-19

## 2023-06-19 ENCOUNTER — CLINICAL SUPPORT (OUTPATIENT)
Dept: REHABILITATION | Facility: HOSPITAL | Age: 73
End: 2023-06-19
Payer: MEDICARE

## 2023-06-19 ENCOUNTER — OFFICE VISIT (OUTPATIENT)
Dept: RADIATION ONCOLOGY | Facility: CLINIC | Age: 73
End: 2023-06-19
Payer: MEDICARE

## 2023-06-19 VITALS
HEART RATE: 79 BPM | SYSTOLIC BLOOD PRESSURE: 118 MMHG | DIASTOLIC BLOOD PRESSURE: 79 MMHG | RESPIRATION RATE: 16 BRPM | OXYGEN SATURATION: 100 % | TEMPERATURE: 98 F

## 2023-06-19 DIAGNOSIS — R29.898 DECREASED ROM OF NECK: ICD-10-CM

## 2023-06-19 DIAGNOSIS — C32.9 LARYNX CANCER: Primary | ICD-10-CM

## 2023-06-19 DIAGNOSIS — C01 MALIGNANT NEOPLASM OF BASE OF TONGUE: Primary | ICD-10-CM

## 2023-06-19 DIAGNOSIS — I89.0 LYMPHEDEMA DUE TO RADIATION: Primary | ICD-10-CM

## 2023-06-19 DIAGNOSIS — L90.5 SCAR CONDITIONS AND FIBROSIS OF SKIN: ICD-10-CM

## 2023-06-19 PROCEDURE — 1159F PR MEDICATION LIST DOCUMENTED IN MEDICAL RECORD: ICD-10-PCS | Mod: CPTII,S$GLB,, | Performed by: RADIOLOGY

## 2023-06-19 PROCEDURE — 99215 OFFICE O/P EST HI 40 MIN: CPT | Mod: S$GLB,,, | Performed by: RADIOLOGY

## 2023-06-19 PROCEDURE — 97140 MANUAL THERAPY 1/> REGIONS: CPT | Mod: PN

## 2023-06-19 PROCEDURE — 1159F MED LIST DOCD IN RCRD: CPT | Mod: CPTII,S$GLB,, | Performed by: RADIOLOGY

## 2023-06-19 PROCEDURE — 1160F PR REVIEW ALL MEDS BY PRESCRIBER/CLIN PHARMACIST DOCUMENTED: ICD-10-PCS | Mod: CPTII,S$GLB,, | Performed by: RADIOLOGY

## 2023-06-19 PROCEDURE — 3066F PR DOCUMENTATION OF TREATMENT FOR NEPHROPATHY: ICD-10-PCS | Mod: CPTII,S$GLB,, | Performed by: RADIOLOGY

## 2023-06-19 PROCEDURE — 1126F AMNT PAIN NOTED NONE PRSNT: CPT | Mod: CPTII,S$GLB,, | Performed by: RADIOLOGY

## 2023-06-19 PROCEDURE — 3061F NEG MICROALBUMINURIA REV: CPT | Mod: CPTII,S$GLB,, | Performed by: RADIOLOGY

## 2023-06-19 PROCEDURE — 3074F PR MOST RECENT SYSTOLIC BLOOD PRESSURE < 130 MM HG: ICD-10-PCS | Mod: CPTII,S$GLB,, | Performed by: RADIOLOGY

## 2023-06-19 PROCEDURE — 1101F PT FALLS ASSESS-DOCD LE1/YR: CPT | Mod: CPTII,S$GLB,, | Performed by: RADIOLOGY

## 2023-06-19 PROCEDURE — 3074F SYST BP LT 130 MM HG: CPT | Mod: CPTII,S$GLB,, | Performed by: RADIOLOGY

## 2023-06-19 PROCEDURE — 1101F PR PT FALLS ASSESS DOC 0-1 FALLS W/OUT INJ PAST YR: ICD-10-PCS | Mod: CPTII,S$GLB,, | Performed by: RADIOLOGY

## 2023-06-19 PROCEDURE — 3078F DIAST BP <80 MM HG: CPT | Mod: CPTII,S$GLB,, | Performed by: RADIOLOGY

## 2023-06-19 PROCEDURE — 3061F PR NEG MICROALBUMINURIA RESULT DOCUMENTED/REVIEW: ICD-10-PCS | Mod: CPTII,S$GLB,, | Performed by: RADIOLOGY

## 2023-06-19 PROCEDURE — 3288F FALL RISK ASSESSMENT DOCD: CPT | Mod: CPTII,S$GLB,, | Performed by: RADIOLOGY

## 2023-06-19 PROCEDURE — 99215 PR OFFICE/OUTPT VISIT, EST, LEVL V, 40-54 MIN: ICD-10-PCS | Mod: S$GLB,,, | Performed by: RADIOLOGY

## 2023-06-19 PROCEDURE — 3066F NEPHROPATHY DOC TX: CPT | Mod: CPTII,S$GLB,, | Performed by: RADIOLOGY

## 2023-06-19 PROCEDURE — 1126F PR PAIN SEVERITY QUANTIFIED, NO PAIN PRESENT: ICD-10-PCS | Mod: CPTII,S$GLB,, | Performed by: RADIOLOGY

## 2023-06-19 PROCEDURE — 1160F RVW MEDS BY RX/DR IN RCRD: CPT | Mod: CPTII,S$GLB,, | Performed by: RADIOLOGY

## 2023-06-19 PROCEDURE — 3078F PR MOST RECENT DIASTOLIC BLOOD PRESSURE < 80 MM HG: ICD-10-PCS | Mod: CPTII,S$GLB,, | Performed by: RADIOLOGY

## 2023-06-19 PROCEDURE — 3288F PR FALLS RISK ASSESSMENT DOCUMENTED: ICD-10-PCS | Mod: CPTII,S$GLB,, | Performed by: RADIOLOGY

## 2023-06-19 PROCEDURE — 3044F HG A1C LEVEL LT 7.0%: CPT | Mod: CPTII,S$GLB,, | Performed by: RADIOLOGY

## 2023-06-19 PROCEDURE — 3044F PR MOST RECENT HEMOGLOBIN A1C LEVEL <7.0%: ICD-10-PCS | Mod: CPTII,S$GLB,, | Performed by: RADIOLOGY

## 2023-06-19 NOTE — PROGRESS NOTES
[facility]   Physical Therapy Treatment Note   Lymphedema - Head and Neck    Name: Cyrus Batres Jr.  Clinic Number: [id number]    Therapy Diagnosis:   No diagnosis found.      Physician: Matheus Portillo Jr., MD    Visit Date: 6/19/2023    Physician Orders: PT Eval and Treat   Medical Diagnosis from Referral: Malignant neoplasm of base of tongue  - Primary   Evaluation Date: 5/8/2023  Authorization Period Expiration: 06-  Plan of Care Expiration: 08-  Progress Note Due: v 10  Visit # / Visits authorized: 10/ 10   FOTO: not completed       Time In: 1500  Time Out: 1550  Total Billable Time: 50 minutes    SUBJECTIVE     Nicolás reports: He reports that he does not like the kinesiotape application using the basket weave pattern, he felt it was too constrictive.  He states that he feels he continues to benefit from skilled PT for lymphedema and fibrosis/scar tissue management.      He states that he has been doing his neck muscle stretches with overpressure daily.     Nicolás reported wearing compression outside of therapy visits: Yes - to anterior neck as instructed at initial evaluation  Nicolás was compliant with home exercise program.    Response to previous treatment: excellent  Functional change: improvement in skin quality and cervical spine range of motion     Pain: 0/10  Location: bilateral anterior neck      OBJECTIVE     Girth Measurements:     Girth Measurements (in centimeters)  LANDMARK Head/Neck Measurement Face  Right Face Left    Diagonal Head Circumference 55       Vertical Submental Circumference         Mandibular Angle to Mandibular Angle 27.5       Tragus Angle to Tragus Angle 32.0       Neck Superior 37.5       Neck Middle 34.0       Neck Inferior  34.5       Tragus to Mental Protuberance         Tragus to Mouth Angle         Mandibular Angle to Nasal Wing         Mandibular Angle to Internal Eye Corner         Mandibular Angle to External Eye Corner          Mental Protuberance to Internal Eye Corner         Mandibular Angle to Mental Protuberance         Totals         Total girth measurement-   220.5 cm       Total Girth Reduction                   Treatment     Nicolás received the treatments listed below:      Patient received from waiting room.     Palpation/Inspection:  Improved skin glide throughout, noted softening of the fibrotic tissues under his chin and along the jaw line.    He has increased active range of motion  to his cervical spine, improving his capacity to look over his shoulder and overhead.      manual therapy techniques: Complete Decongestive Therapy was applied to the: head and neck for 40 minutes, including: manual lymphatic drainage and short stretch compression bandaging     MANUAL LYMPHATIC DRAINAGE:   Short neck  Prom with end range of motion prolonged passive stretch to anterior chest, shoulder depression  Grade 2-3 depression mobilizations to the first rib, right and left   Long neck  Anterior neck routed posterior to the radiation therapy zone and then to terminus  Fibrosis techniques to scar and fibrosis at anterior neck/radiation therapy zone - bulk of treatment  Mobilization,  cartilages in anterior neck and trachea  S deformation and elongation of SCM and anterior strap muscle  PPS to left and right SCM with overpressure  Redo long neck  Deep breathing  Grade 2-3 mobilizations to increase accessory mobility to left and right first rib depression, PA mobilizations T3-C2 - all x 10 repetitions each - Not completed  Gentle cupping with small rigid cups - used pulsating  technique to increase soft tissue extensibility along the left and right jaw line, with care to avoid anterior neck over vessels. Not completed this date  Kinesiotaping using herlinda pattern to anterior neck.  Skin prep applied prior to tape.  He was issued 3 cuts of tape to reapply as needed bw PT visits.     06-:   Patient was demonstrated and practices lateral  cervical muscle stretch, with one hand anchored under thigh and the other to provide overpressure.  He was instructed to perform with submax tension, and to hold for 90 seconds.    Patient Education and Home Exercises       Home Exercises and Patient Education Provided     Education provided:   - Educated in self massage to abdominal areas, and cervical region  - Patient to leave short-stretch compression bandages intact for 12-24 hours as tolerated, discontinue with any problems, return rolled bandages next session. Wash and wear schedules confirmed.   - Continue HEP of AROM, stretching, and postural correction.   - Educated on self or assisted bandaging, compression options, and risk reduction    06-:   Patient was demonstrated and practices lateral cervical muscle stretch, with one hand anchored under thigh and the other to provide overpressure.  He was instructed to perform with submax tension, and to hold for 90 seconds.    Written Home Exercises Provided: Patient instructed to cont prior HEP. Exercises were reviewed and Nicolás was able to demonstrate them prior to the end of the session.  Nicolás demonstrated good  understanding of the education provided. See EMR under Patient Instructions for exercises provided during therapy sessions    ASSESSMENT     Nicolás had (+) response to manual therapy.  His skin quality has improved, evidenced by decreased adherence to basement layer and decreased puckering at midline at graft site.  His cervical spine active range of motion has improved and he is better able to look over his shoulder with driving, by his report.   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   He has been compliant with his home exercise program and compression use.       Nicolás Is progressing well towards his goals.   Pt prognosis is Good.      Patient will continue to benefit from skilled outpatient physical therapy to address the deficits listed in the problem list box on initial evaluation, provide patient/family education and to maximize patient's level of independence in the home and community environment.     Patient's spiritual, cultural and educational needs considered and patient agreeable to plan of care and goals.     Anticipated barriers to physical therapy: severity of fibrosis and scarring    Goals:   The following goals were discussed with the patient and patient is in agreement with them as to be addressed in the treatment plan.      Short Term Goals: (6 weeks)   Goals: Progressing / MET Date Established Date Assessed   1. Patient will demonstrate decreased localized lymphedema to the jawline by  palpation and visualization of improved symmetry left and right  PROGRESSING 05-  PROGRESSING 06-   2. Patient will demonstrate 100% knowledge of lymphedema precautions and signs of infection to allow for reduced lymphedema risk, infection risk, and/or exacerbation of condition.  PROGRESSING 05-  MET 05-   3. Patient or caregiver will perform self-bandaging techniques and/or wearing of compression garments to allow for lymphatic drainage support, skin elasticity, and reduction in shape and size of limb.  PROGRESSING 05-  MET 05-   4. Patient will perform self lymph drainage techniques to areas within reach to enhance lymphatic drainage and skin condition.  PROGRESSING 05-  MET 05-   5. Patient will tolerate daily activities with external foam chips compression/ bandaging to allow for lymphatic and venous support.  PROGRESSING 05-  MET 05-      Long Term Goals: (12  weeks)   Goals: Progressing / MET Date Established Date Assessed   1. Patient will show decreased total girth measurement in sum of measurements to head and neck by up to 2 cm  to allow for improved comfort.  PROGRESSING 05-   PROGRESSING 06-   2. Patient will show reduction in density to mild/moderate or less with improved contour of limb to allow for cosmesis, symmetry, infection risk reduction, excursion of sift tissues for swallowing to clear secretions to protect the airway PROGRESSING 05-   PROGRESSING 06-   3. Patient to malinda/doff compression garment with daily compliance to assist in lymphedema management, skin elasticity, and tissue density PROGRESSING 05-  MET 05-   4. Patient to show improved postural awareness and alignment. PROGRESSING 05-  MET 06-   5. Patient to be independent and compliant with home exercise program to allow for increased function in affected limb. PROGRESSING 05-         PLAN     Continue Physical Therapy  2x weekly for 6 weeks Complete Decongestive Therapy:  Manual lymphatic drainage, Multilayered short stretch bandaging, Pneumatic compression, Therapeutic exercises, and Patient education as deemed necessary to achieve stated goals.    2.  RE-MEASURE    3.  Use scar tool and negative pressure to progress fibrosis management    Ena Mott, PT CLT   06-

## 2023-06-19 NOTE — PROGRESS NOTES
Cyrus Batres Jr.  [id number]  1950 6/19/2023  No referring provider defined for this encounter.    DIAGNOSIS:  Cancer Staging   Larynx cancer  Staging form: Larynx - Glottis, AJCC 8th Edition  - Clinical stage from 8/6/2020: Stage II (cT2, cN0, cM0) - Signed by Fariha Muñoz NP on 8/6/2020  - Pathologic stage from 1/20/2022: Stage LOU (pT4a, pN0, cM0) - Signed by Fariha Muñoz NP on 1/20/2022  Staging form: Pharynx - P16 Negative Oropharynx, AJCC 8th Edition  - Clinical: Stage II (rcT2, cN0, cM0) - Signed by Matheus Portillo Jr., MD on 5/30/2022    Malignant neoplasm of base of tongue  Staging form: Pharynx - P16 Negative Oropharynx, AJCC 8th Edition  - Clinical stage from 5/20/2022: Stage II (cT2, cN0, cM0, p16-) - Signed by Saúl Kessler MD on 7/7/2022    REASON FOR VISIT: Routine scheduled follow-up.    HISTORY OF PRESENT ILLNESS:   Cyrus Batres Jr. is a 72 y.o. male never smoker who presented with intermittent hoarseness and weakening of the voice throughout the day with persistent cough. He has a +FHx of similar diagnosis in his father who was a smoker (he reports benign--laryngeal polyps?).     Patient was evaluated by Dr. Xiao with DFL noting irregularity at L TVC and short interval f/u with microlaryngoscopy where he noted the lesion at the superior surface of the left vocal cord without definitive invasion of the anterior commissure and possibly 1 mm of subglottic extension. Biopsy from the left true vocal cord demonstrated severe dysplastic changes without definitive invasive carcinoma though suspicious.  The left anterior commissure biopsy demonstrated moderate dysplasia without malignancy. CT of the neck and soft tissues appreciating an enhancing 6 mm focus at the superior aspect of the left vocal cord near the anterior commissure.     Follow-up DFL was suspicious for invasive tumor with four month follow-up CT of the neck and soft tissues demonstrating enlargement to 1.4 x 1.1 x 0.7 cm without  evidence of pathologic lymphadenopathy.  Dr. Xiao referred to Dr. Noel who performed FLV suspicious for malignancy then microlaryngoscopy noting bulky tumor extending into the left true vocal fold, crossing the anterior commissure to involve the left false vocal cord with infraglottic extent x 8 mm. Aggressive debulking revealed the tumor to have deeply invaded with pathology from left true vocal cord demonstrating moderately differentiated SCCA without lymphovascular or perineural invasion.     CT of the chest was clear.  His case was reviewed by multidisciplinary tumor board that recommended definitive radiotherapy.  I discussed in detail with Dr. Noel who reports extensive debulking of the tumor.     Mr. Batres completed IMRT to his larynx and bilateral necks via SiB totaling 6996 cGy on 10/30/2020.     Following completion of radiotherapy patient has continued under the care of Dr. Noel requiring microlaryngoscopy with stripping of persistent disease.  He has continued on surveillance with suggestion of progressive disease that progressed to hemoptysis.      He was ultimately taken for salvage laryngectomy by Dr. James:              - 4.2cm g1-2 keratinizing SCCa invading the left lobe of thyroid gland.              - margin close at 1 mm near right thyroid cartilage              - 0/50 LNs              - pT4aN0     I met with him postoperatively as did Dr. Khoobehi, and no further adjuvant radiotherapy or systemic therapy was recommended.     Patient has been following with PT/ST and presented with hemoptysis.  CT of the neck was remarkable for 2.1 cm enhancing mass at the left base of tongue.  Dr. James took the patient for DL revealing a mass at the base of the tongue at junction of the neopharynx, crossing midline and extending to the lateral pharyngeal wall, biopsy confirming a poorly differentiated SCCA, p16 negative.  PET-CT confirmed a 2.9 x 2.5 cm left base of tongue mass, SUV 11.4 with inferior  nodular extension measuring 1.3 mm in size, SUV 8.3.  There was no evidence of regional lymphadenopathy or distant disease.     He has presented multiple times to the hospital with recurrent bleeding on Eliquis.  CTA neck was negative for arterial involvement. He was evaluated by IR for potential lingual artery embolization, ultimately without intervention.  Arteriogram demonstrated hypervascular tumor without extravasation, pseudoaneurysm or fistula.    I met with him in May 2022 and demonstrated recurrent disease to be within high-dose region with risk of reirradiation, and advised consideration of salvage surgery.  He met with Dr. James and ultimately declined surgery.  After discussing with Drs. Khoobehi and Checo, he was placed on systemic therapy of carboplatin, 5 FU, Keytruda completing 3 cycles. CTA N demonstrated progression of the hypo pharyngeal mass, extending to the floor of the mouth on the left and medial margin of the mandible.  PET-CT confirmed no distant disease.  He was seen by my partner Dr. Lucero and consulted with Dr. Tamez before returning to Dr. James, ultimately agreeing to resection.    He was taken for pharyngectomy and glossectomy with ALT free flap by Haleigh James and Tanner, 11/09/2022:  - extensive tumor extending out into the neck within level 2.  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  He was discharged and was seen by Dr. Tamez who is considering him for further systemic therapy due to positive margin.  He returns to discuss.  Follow-up with Dr. James today who advised would benefit from RT.    Completed adjuvant chemoradiation  therapy with platinum sensitization to 66 Gy ending March 9, 2023.  Treatment very well tolerated.    INTERVAL HISTORY:   Patient denies fever, chills, chest pain, shortness of breath, cough or hemoptysis.  He reports dysgeusia and tenacious mucus.  He has difficulty expressing the mucus due to his surgery and radiotherapy.  He is working with physical therapy and reports improved range of motion at neck without swelling.  Five cartons of feeds daily with increased activity level and denies fatigue or endurance issues.    6/23 Dr. James: ZAY; f/u 2mos    Following with ENDO: hypothyroid on synthroid; hypoCa    Review of systems otherwise negative unless indicated in HPI/interval history.    Past Medical History:   Diagnosis Date    Abnormal CT scan, neck 09/22/2022    Allergy     pollen extracts    Atrial fibrillation     Chronic anticoagulation     Diabetes mellitus, type 2     GRIFFIN (dyspnea on exertion)     Encounter for blood transfusion     GERD (gastroesophageal reflux disease)     Gout, unspecified     Hypertension     Hypothyroidism 11/22/2022    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    PEG (percutaneous endoscopic gastrostomy) adjustment/replacement/removal 11/22/2022    Postoperative 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    Tongue cancer     Tracheostomy dependence     Type 2 diabetes mellitus, without long-term current use of insulin 11/22/2022     Past Surgical History:   Procedure Laterality Date    DIRECT LARYNGOBRONCHOSCOPY N/A 12/27/2021    Procedure: LARYNGOSCOPY, DIRECT, WITH BRONCHOSCOPY;  Surgeon: Flex Espinosa MD;  Location: [facility] OR [address] [place] [address];  Service: ENT;  Laterality: N/A;    DIRECT LARYNGOSCOPY  11/9/2022    Procedure: LARYNGOSCOPY, DIRECT;  Surgeon: Jesse James MD;  Location: [facility] OR [address] [place] [address];  Service: ENT;;    DISSECTION OF NECK Bilateral 1/6/2022    Procedure: DISSECTION, NECK;  Surgeon: Jesse James MD;  Location: [facility] OR [address] [place] [address];  Service: ENT;  Laterality: Bilateral;    DISSECTION  OF NECK Bilateral 11/9/2022    Procedure: DISSECTION, NECK;  Surgeon: Jesse James MD;  Location: [facility] OR [facility];  Service: ENT;  Laterality: Bilateral;    FLAP PROCEDURE Right 1/6/2022    Procedure: CREATION, FREE FLAP;  Surgeon: Alise Hart MD;  Location: [facility] OR [facility];  Service: ENT;  Laterality: Right;  Ischemic start 1351  Ischemic stop 1502    FLAP PROCEDURE Left 11/9/2022    Procedure: CREATION, FREE FLAP, ALT;  Surgeon: Alise Hart MD;  Location: [facility] OR [facility];  Service: ENT;  Laterality: Left;    GLOSSECTOMY Bilateral 11/9/2022    Procedure: TOTAL GLOSSECTOMY;  Surgeon: Jesse James MD;  [facility];  Service: ENT;  Laterality: Bilateral;    INSERTION OF TUNNELED CENTRAL VENOUS CATHETER (CVC) WITH SUBCUTANEOUS PORT N/A 6/9/2022    Procedure: [id number];  Surgeon: Jesus Viera MD;  Location: [facility];  Service: General;  Laterality: N/A;    INSERTION OF TUNNELED CENTRAL VENOUS CATHETER (CVC) WITH SUBCUTANEOUS PORT Right 1/12/2023    Procedure: [id number]-[id number];  Surgeon: Abdulaziz Le Jr., MD;  Location: [facility];  Service: General;  Laterality: Right;    LARYNGECTOMY N/A 1/6/2022    Procedure: LARYNGECTOMY;  Surgeon: Jesse James MD;  [facility];  Service: ENT;  Laterality: N/A;    LARYNGOSCOPY N/A 8/4/2020    Procedure: Suspension microlaryngoscopy with biopsy, possible KTP laser treatment/excision;  Surgeon: Stew Noel MD;  [facility];  Service: ENT;  Laterality: N/A;  Microscope, telescopes, tower, microinstruments, KTP laser, rep conf# [id number] IC 7/28.    LARYNGOSCOPY N/A 3/16/2021    Procedure: Suspension microlaryngoscopy with excision of lesion, possible CO2 laser;  Surgeon: Stew Noel MD;  [facility];  Service: ENT;  Laterality: N/A;  Microscope, telescopes, tower, microinstruments, CO2 laser, rep conf# [id number] IC 3/4.    LARYNGOSCOPY N/A 4/1/2021    Procedure:  Suspension microlaryngoscopy with KTP laser excision of lesion;  Surgeon: Stew Noel MD;  [facility];  Service: ENT;  Laterality: N/A;  Microscope, telescopes, tower, microinstruments, 70 degree scope, vocal fold , KTP laser, rep conf# [id number] BC    LARYNGOSCOPY N/A 12/9/2021    Procedure: Suspension microlaryngoscopy with biopsy;  Surgeon: Stew Noel MD;  [facility];  Service: ENT;  Laterality: N/A;  Microscope, telescopes, tower, microinstruments    LARYNGOSCOPY N/A 1/6/2022    Procedure: LARYNGOSCOPY;  Surgeon: Jesse James MD;  [facility];  Service: ENT;  Laterality: N/A;    LARYNGOSCOPY N/A 4/27/2022    Procedure: LARYNGOSCOPY WITH BIOPSY;  Surgeon: Jesse James MD;  Location: [facility] OR [address] [place] [address];  Service: ENT;  Laterality: N/A;    MICROLARYNGOSCOPY N/A 3/17/2020    Procedure: MICROLARYNGOSCOPY;  Surgeon: Jung Xiao MD;  Location: [facility];  Service: ENT;  Laterality: N/A;  Laser Microlaryngoscopy  NEED TO SCHEDULE LASER from [facility] [id number]    PHARYNGECTOMY  11/9/2022    Procedure: TOTAL PHARYNGECTOMY;  Surgeon: Jesse James MD;  Location: [facility] OR [address] [place] [address];  Service: ENT;;    REIMPLANTATION OF PARATHYROID TISSUE N/A 1/6/2022    Procedure: REIMPLANTATION, PARATHYROID TISSUE;  Surgeon: Jesse James MD;  [facility];  Service: ENT;  Laterality: N/A;    SKIN SPLIT GRAFT Right 11/9/2022    Procedure: APPLICATION, GRAFT, SKIN, SPLIT-THICKNESS;  Surgeon: Jesse James MD;  Location: [facility] OR [address] [place] [address];  Service: ENT;  Laterality: Right;    THYROIDECTOMY  1/6/2022    Procedure: THYROIDECTOMY;  Surgeon: Jesse James MD;  Location: [facility] OR [address] [place] [address];  Service: ENT;;    TRACHEOSTOMY N/A 12/27/2021    Procedure: CREATION, TRACHEOSTOMY;  Surgeon: Flex Espinosa MD;  Location: [facility] OR [address] [place] [address];  Service: ENT;  Laterality: N/A;     Social History     Socioeconomic History    Marital  status:      Spouse name: Janel Batres    Number of children: 2   Occupational History    Occupation: [employer]     Employer: [employer]   Tobacco Use    Smoking status: Never    Smokeless tobacco: Never   Substance and Sexual Activity    Alcohol use: Not Currently     Comment: occasional    Drug use: No    Sexual activity: Yes     Partners: Female   Social History Narrative    ** Merged History Encounter **         2 children from his 1st wife     Social Determinants of Health     Financial Resource Strain: Low Risk     Difficulty of Paying Living Expenses: Not hard at all   Food Insecurity: No Food Insecurity    Worried About Running Out of Food in the Last Year: Never true    Ran Out of Food in the Last Year: Never true   Transportation Needs: No Transportation Needs    Lack of Transportation (Medical): No    Lack of Transportation (Non-Medical): No   Physical Activity: Insufficiently Active    Days of Exercise per Week: 1 day    Minutes of Exercise per Session: 30 min   Stress: Stress Concern Present    Feeling of Stress : To some extent   Social Connections: Socially Isolated    Frequency of Communication with Friends and Family: Once a week    Frequency of Social Gatherings with Friends and Family: Once a week    Attends Orthodox Services: Never    Active Member of Clubs or Organizations: No    Attends Club or Organization Meetings: Never    Marital Status:    Housing Stability: Low Risk     Unable to Pay for Housing in the Last Year: No    Number of Places Lived in the Last Year: 1    Unstable Housing in the Last Year: No     Family History   Problem Relation Age of Onset    Abnormal EKG Mother     Diabetes Father     Heart disease Father     Hypertension Father      Medication List with Changes/Refills   Current Medications    ACETAMINOPHEN (TYLENOL) 325 MG TABLET    Take 325 mg by mouth every 6 (six) hours as needed for Pain.    CALCIUM CARBONATE 500 MG/5 ML (1,250 MG/5 ML)    Take 20 mls  four times daily with meals and nightly.    ESOMEPRAZOLE (NEXIUM) 40 MG CAPSULE    40 mg before breakfast.    FAMOTIDINE (PEPCID) 40 MG/5 ML (8 MG/ML) SUSPENSION    Give 2.5 mLs (20 mg total) by Per G Tube route 2 (two) times daily.    IBUPROFEN (ADVIL,MOTRIN) 200 MG TABLET    Take 200 mg by mouth every 6 (six) hours as needed for Pain.    LACTOSE-REDUCED FOOD WITH FIBR (JEVITY 1.5 TRISHA) 0.06 GRAM-1.5 KCAL/ML LIQD    6 cartons per day via peg.  Flush 60ml before and after each bolus    LEVOTHYROXINE (SYNTHROID) 100 MCG TABLET    1 tablet (100 mcg total) by Per G Tube route before breakfast.    LOSARTAN (COZAAR) 100 MG TABLET    Take 0.5 tablets (50 mg total) by mouth once daily.    METFORMIN (GLUCOPHAGE) 500 MG TABLET    Take 1 tablet (500 mg total) by mouth 2 (two) times daily with meals.    PROMETHAZINE (PHENERGAN) 25 MG TABLET    Take 1 tablet (25 mg total) by mouth every 4 to 6 hours as needed for Nausea.    TRAZODONE (DESYREL) 50 MG TABLET    TAKE 1 TABLET BY MOUTH NIGHTLY AS NEEDED FOR INSOMNIA.    ZOLPIDEM (AMBIEN) 5 MG TAB    1 tablet (5 mg total) by Per G Tube route nightly as needed (difficult with sleep).     Review of patient's allergies indicates:   Allergen Reactions    Lovastatin Itching    Pollen extracts     Lovastatin Rash     Not confirmed but pt skeptical       QUALITY OF LIFE: 90%- Able to Carry on Normal Activity: Minor Symptoms of Disease    There were no vitals filed for this visit.  There is no height or weight on file to calculate BMI.    PHYSICAL EXAM:   GENERAL: alert; in no apparent distress.   HEAD: normocephalic, atraumatic.  EYES: pupils are equal, round, reactive to light and accommodation. Sclera anicteric. Conjunctiva not injected.   NOSE/THROAT: no nasal erythema or rhinorrhea. Oropharynx pink, without erythema, ulcerations or thrush. Aphonic; tenacious mucus  NECK: mild cervical motion rigidity; supple with no masses. Trach c/d/i  CHEST: Patient is speaking comfortably on room air  with normal work of breathing without using accessory muscles of respiration.  CARDIOVASCULAR: regular rate and rhythm  ABDOMEN: soft, nontender, nondistended. Peg c/d/i  MUSCULOSKELETAL: no tenderness to palpation along the spine or scapulae. Normal range of motion.  NEUROLOGIC: cranial nerves II-XII intact bilaterally. Strength 5/5 in bilateral upper and lower extremities. No sensory deficits appreciated. Normal gait.  LYMPHATIC: no cervical, supraclavicular adenopathy appreciated. Mpderate fibrosis  EXTREMITIES: no clubbing, cyanosis, edema.  SKIN: no erythema, rashes, ulcerations noted.     ANCILLARY DATA:   5/30/23 PET  IMPRESSION: Postsurgical changes from total glossectomy, laryngectomy and pharyngectomy with bilateral neck dissections     There is resolution of the previously noted fluid collections within the neck. There is mild FDG activity in the left side the neck adjacent to the neoesophagus as well as at the tracheostomy site to the right of the neoesophagus. Findings most likely are secondary to postsurgical and postinfection changes. There is no focal mass or adenopathy     No evidence of distant metastasis    ASSESSMENT: Cyrus Batres Jr. is a male with stage [id number] R1 mod-poor diff p16(-) SCCA BOT  PLAN:     - Cont antihistamine, mucinex, fluids by PEG tube to assist with tenacious mucus.  Will inquire on suggestions from ENT.  Will CC endo/dietitian regarding hypocalcemia in relation to patient's feeds.  - ZAY by REBEL OLMOS, 5/23 PET  - Studies: con CT N/C 11/23  - Follow up with Dr. James, Dr. Tamez, ENDO  - Return to clinic 6mos.    All questions answered and contact information provided. Patient understands free to call us anytime with any questions or concerns regarding radiation therapy.    I have personally seen and evaluated this patient with a moderate to high complexity diagnosis.      Greater than 45 minutes were dedicated to reviewing/interpreting pertinent  laboratory/imaging/pathology as well as follow-up with concurrent consultants; reviewing and performing history and physical; counseling patient on continuing oncologic recommendations; documentation in the electronic medical record including ordering of additional tests and/or radiation treatment protocol; and coordination of care with physicians with referrals placed as appropriate.    PHYSICIAN: Matheus Portillo Jr, MD

## 2023-06-20 DIAGNOSIS — R05.9 COUGH: ICD-10-CM

## 2023-06-20 RX ORDER — CODEINE PHOSPHATE AND GUAIFENESIN 10; 100 MG/5ML; MG/5ML
5 SOLUTION ORAL 3 TIMES DAILY PRN
Qty: 473 ML | Refills: 0 | Status: SHIPPED | OUTPATIENT
Start: 2023-06-20 | End: 2023-07-22

## 2023-06-21 ENCOUNTER — CLINICAL SUPPORT (OUTPATIENT)
Dept: REHABILITATION | Facility: HOSPITAL | Age: 73
End: 2023-06-21
Payer: MEDICARE

## 2023-06-21 DIAGNOSIS — L90.5 SCAR CONDITIONS AND FIBROSIS OF SKIN: ICD-10-CM

## 2023-06-21 DIAGNOSIS — R29.898 DECREASED ROM OF NECK: ICD-10-CM

## 2023-06-21 DIAGNOSIS — I89.0 LYMPHEDEMA DUE TO RADIATION: Primary | ICD-10-CM

## 2023-06-21 PROCEDURE — 97140 MANUAL THERAPY 1/> REGIONS: CPT | Mod: PN

## 2023-06-21 NOTE — PROGRESS NOTES
[facility]   Physical Therapy Treatment Note   Lymphedema - Head and Neck    Name: Cyrus Batres Jr.  Clinic Number: [id number]    Therapy Diagnosis:   Encounter Diagnoses   Name Primary?    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    Scar conditions and fibrosis of skin     Decreased ROM of neck          Physician: Matheus Portillo Jr., MD    Visit Date: 6/21/2023    Physician Orders: PT Eval and Treat   Medical Diagnosis from Referral: Malignant neoplasm of base of tongue  - Primary   Evaluation Date: 5/8/2023  Authorization Period Expiration: 06-  Plan of Care Expiration: 08-  Progress Note Due: v 10  Visit # / Visits authorized: 11/ 10   FOTO: not completed       Time In: 1400  Time Out: 1455  Total Billable Time: 55 minutes    SUBJECTIVE     Nicolás reports: He reports that continues to use the kinesiotaping, and does his self massage.  He continues to hat increase tightness and tension below the right angle of jaw and length of SCM.    He states that he has been doing his neck muscle stretches with overpressure daily.     Nicolás reported wearing compression outside of therapy visits: Yes - to anterior neck as instructed at initial evaluation  Nicolás was compliant with home exercise program.    Response to previous treatment: excellent  Functional change: improvement in skin quality and cervical spine range of motion     Pain: 0/10  Location: bilateral anterior neck      OBJECTIVE     Girth Measurements:     Girth Measurements (in centimeters)  LANDMARK Head/Neck Measurement Face  Right Face Left    Diagonal Head Circumference 55       Vertical Submental Circumference         Mandibular Angle to Mandibular Angle 27.5       Tragus Angle to Tragus Angle 32.0       Neck Superior 37.5       Neck Middle 34.0       Neck Inferior  34.5       Tragus to Mental Protuberance         Tragus to Mouth Angle         Mandibular Angle to Nasal Wing         Mandibular Angle to Internal Eye Corner          Mandibular Angle to External Eye Corner         Mental Protuberance to Internal Eye Corner         Mandibular Angle to Mental Protuberance         Totals         Total girth measurement-   220.5 cm       Total Girth Reduction                   Treatment     Nicolás received the treatments listed below:      Patient received from waiting room.     Palpation/Inspection:  Improved skin glide throughout, noted softening of the fibrotic tissues under his chin and along the jaw line.    He has increased active range of motion  to his cervical spine, improving his capacity to look over his shoulder and overhead.      manual therapy techniques: Complete Decongestive Therapy was applied to the: head and neck for 55 minutes, including: manual lymphatic drainage and short stretch compression bandaging     Treatment forcus on decreasing fibrosis and scarring to anterior neck in sx and radiation therapy zone     MANUAL LYMPHATIC DRAINAGE:   Short neck  Prom with end range of motion prolonged passive stretch to anterior chest, shoulder depression  Grade 2-3 depression mobilizations to the first rib, right and left   Long neck  Anterior neck routed posterior to the radiation therapy zone and then to terminus  Fibrosis techniques to scar and fibrosis at anterior neck/radiation therapy zone - bulk of treatment  Mobilization,  cartilages in anterior neck and trachea  S deformation and elongation of SCM and anterior strap muscle  PPS to left and right SCM with overpressure  Redo long neck  Deep breathing  Gentle cupping with small rigid cups - used pulsating  technique to increase soft tissue extensibility along the left and right jaw line, with care to avoid anterior neck over vessels. Not completed this date  Scar tissue management using (-) pressure to horizontal scar from sx, care to avoid over vascular structures  Kinesiotaping using herlinda pattern to anterior neck.  Skin prep applied prior to tape.  He was issued 3 cuts of  tape to reapply as needed bw PT visits.     06-:   Patient was demonstrated and practices lateral cervical muscle stretch, with one hand anchored under thigh and the other to provide overpressure.  He was instructed to perform with submax tension, and to hold for 90 seconds.    Patient Education and Home Exercises       Home Exercises and Patient Education Provided     Education provided:   - Educated in self massage to abdominal areas, and cervical region  - Patient to leave short-stretch compression bandages intact for 12-24 hours as tolerated, discontinue with any problems, return rolled bandages next session. Wash and wear schedules confirmed.   - Continue HEP of AROM, stretching, and postural correction.   - Educated on self or assisted bandaging, compression options, and risk reduction    06-:   Patient was demonstrated and practices lateral cervical muscle stretch, with one hand anchored under thigh and the other to provide overpressure.  He was instructed to perform with submax tension, and to hold for 90 seconds.    Written Home Exercises Provided: Patient instructed to cont prior HEP. Exercises were reviewed and Nicolás was able to demonstrate them prior to the end of the session.  Nicolás demonstrated good  understanding of the education provided. See EMR under Patient Instructions for exercises provided during therapy sessions    ASSESSMENT     Nicolás had (+) response to manual therapy.  His skin quality has improved, evidenced by decreased adherence to basement layer and decreased puckering at midline at graft site.  He has decreased soft tissue tension throughout his anterior neck, his SCM and jawline have persistent radiation therapy fibrosis which softens the manual therapy, but returns bw PT visits, yet overall has decreased in density and tension.   He has been compliant with his home exercise program and compression use.       Nicolás Is progressing well towards his goals.   Pt  prognosis is Good.     Patient will continue to benefit from skilled outpatient physical therapy to address the deficits listed in the problem list box on initial evaluation, provide patient/family education and to maximize patient's level of independence in the home and community environment.     Patient's spiritual, cultural and educational needs considered and patient agreeable to plan of care and goals.     Anticipated barriers to physical therapy: severity of fibrosis and scarring    Goals:   The following goals were discussed with the patient and patient is in agreement with them as to be addressed in the treatment plan.      Short Term Goals: (6 weeks)   Goals: Progressing / MET Date Established Date Assessed   1. Patient will demonstrate decreased localized lymphedema to the jawline by  palpation and visualization of improved symmetry left and right  PROGRESSING 05-  PROGRESSING 06-   2. Patient will demonstrate 100% knowledge of lymphedema precautions and signs of infection to allow for reduced lymphedema risk, infection risk, and/or exacerbation of condition.  PROGRESSING 05-  MET 05-   3. Patient or caregiver will perform self-bandaging techniques and/or wearing of compression garments to allow for lymphatic drainage support, skin elasticity, and reduction in shape and size of limb.  PROGRESSING 05-  MET 05-   4. Patient will perform self lymph drainage techniques to areas within reach to enhance lymphatic drainage and skin condition.  PROGRESSING 05-  MET 05-   5. Patient will tolerate daily activities with external foam chips compression/ bandaging to allow for lymphatic and venous support.  PROGRESSING 05-  MET 05-      Long Term Goals: (12  weeks)   Goals: Progressing / MET Date Established Date Assessed   1. Patient will show decreased total girth measurement in sum of measurements to head and neck by up to 2 cm  to allow for  improved comfort. PROGRESSING 05-   PROGRESSING 06-   2. Patient will show reduction in density to mild/moderate or less with improved contour of limb to allow for cosmesis, symmetry, infection risk reduction, excursion of sift tissues for swallowing to clear secretions to protect the airway PROGRESSING 05-   PROGRESSING 06-   3. Patient to malinda/doff compression garment with daily compliance to assist in lymphedema management, skin elasticity, and tissue density PROGRESSING 05-  MET 05-   4. Patient to show improved postural awareness and alignment. PROGRESSING 05-  MET 06-   5. Patient to be independent and compliant with home exercise program to allow for increased function in affected limb. PROGRESSING 05-         PLAN     Continue Physical Therapy  2x weekly for 6 weeks Complete Decongestive Therapy:  Manual lymphatic drainage, Multilayered short stretch bandaging, Pneumatic compression, Therapeutic exercises, and Patient education as deemed necessary to achieve stated goals.    2.  RE-MEASURE    3.  Use scar tool and negative pressure to progress fibrosis management - ongoing    Ena Mott, PT CLT   06-

## 2023-06-28 ENCOUNTER — CLINICAL SUPPORT (OUTPATIENT)
Dept: REHABILITATION | Facility: HOSPITAL | Age: 73
End: 2023-06-28
Payer: MEDICARE

## 2023-06-28 DIAGNOSIS — L90.5 SCAR CONDITIONS AND FIBROSIS OF SKIN: ICD-10-CM

## 2023-06-28 DIAGNOSIS — R29.898 DECREASED ROM OF NECK: ICD-10-CM

## 2023-06-28 DIAGNOSIS — I89.0 LYMPHEDEMA DUE TO RADIATION: Primary | ICD-10-CM

## 2023-06-28 PROCEDURE — 97140 MANUAL THERAPY 1/> REGIONS: CPT | Mod: PN

## 2023-06-28 NOTE — PROGRESS NOTES
[facility]   Physical Therapy Treatment Note   Lymphedema - Head and Neck    Name: Cyrus Batres Jr.  Clinic Number: [id number]    Therapy Diagnosis:   Encounter Diagnoses   Name Primary?    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    Scar conditions and fibrosis of skin     Decreased ROM of neck          Physician: Matheus Portillo Jr., MD    Visit Date: 6/28/2023    Physician Orders: PT Eval and Treat   Medical Diagnosis from Referral: Malignant neoplasm of base of tongue  - Primary   Evaluation Date: 5/8/2023  Authorization Period Expiration: 06-  Plan of Care Expiration: 08-  Progress Note Due: v 10  Visit # / Visits authorized: 11/ 10   FOTO: not completed       Time In: 1455  Time Out: 1345  Total Billable Time: 50 minutes    SUBJECTIVE     Nicolás reports: He reports that continues to use the kinesiotaping, and does his self massage.  He reports overall improvement, his symptoms of fluid congestion and soft tissue tension from radiation therapy wax/wane daily.     He states that he has been doing his neck muscle stretches with overpressure daily.     Nicolás reported wearing compression outside of therapy visits: Yes - to anterior neck as instructed at initial evaluation  Nicolás was compliant with home exercise program.    Response to previous treatment: excellent  Functional change: improvement in skin quality and cervical spine range of motion     Pain: 0/10  Location: bilateral anterior neck      OBJECTIVE     Girth Measurements:     Girth Measurements (in centimeters)  LANDMARK Head/Neck Measurement Face  Right Face Left    Diagonal Head Circumference 55       Vertical Submental Circumference         Mandibular Angle to Mandibular Angle 27.5       Tragus Angle to Tragus Angle 32.0       Neck Superior 37.5       Neck Middle 34.0       Neck Inferior  34.5       Tragus to Mental Protuberance         Tragus to Mouth Angle         Mandibular Angle to Nasal Wing         Mandibular  Angle to Internal Eye Corner         Mandibular Angle to External Eye Corner         Mental Protuberance to Internal Eye Corner         Mandibular Angle to Mental Protuberance         Totals         Total girth measurement-   220.5 cm       Total Girth Reduction                   Treatment     Nicolás received the treatments listed below:      Patient received from waiting room.     Palpation/Inspection:  Improved skin glide throughout, noted softening of the fibrotic tissues under his chin and along the jaw line.    He has increased active range of motion  to his cervical spine, improving his capacity to look over his shoulder and overhead.      manual therapy techniques: Complete Decongestive Therapy was applied to the: head and neck for 55 minutes, including: manual lymphatic drainage and short stretch compression bandaging     Treatment forcus on decreasing fibrosis and scarring to anterior neck in sx and radiation therapy zone     MANUAL LYMPHATIC DRAINAGE:   Short neck  Prom with end range of motion prolonged passive stretch to anterior chest, shoulder depression  Grade 2-3 depression mobilizations to the first rib, right and left   Long neck  Anterior neck routed posterior to the radiation therapy zone and then to terminus  Fibrosis techniques to scar and fibrosis at anterior neck/radiation therapy zone - bulk of treatment  Mobilization,  cartilages in anterior neck and trachea  S deformation and elongation of SCM and anterior strap muscle  PPS to left and right SCM with overpressure  Redo long neck  Deep breathing  Gentle cupping with small rigid cups - used pulsating  technique to increase soft tissue extensibility along the left and right jaw line, with care to avoid anterior neck over vessels. Not completed this date  Scar tissue management using (-) pressure to horizontal scar from sx, along lower jaw line, with care to avoid over vascular structures  Kinesiotaping using herlinda pattern to anterior neck.   Skin prep applied prior to tape.  He was issued 3 cuts of tape to reapply as needed bw PT visits - deferred 2' cupping with heavy lotion.  Patient to apply later this evening after shower.     06-:   Patient was demonstrated and practices lateral cervical muscle stretch, with one hand anchored under thigh and the other to provide overpressure.  He was instructed to perform with submax tension, and to hold for 90 seconds.    Patient Education and Home Exercises       Home Exercises and Patient Education Provided     Education provided:   - Educated in self massage to abdominal areas, and cervical region  - Patient to leave short-stretch compression bandages intact for 12-24 hours as tolerated, discontinue with any problems, return rolled bandages next session. Wash and wear schedules confirmed.   - Continue HEP of AROM, stretching, and postural correction.   - Educated on self or assisted bandaging, compression options, and risk reduction    06-:   Patient was demonstrated and practices lateral cervical muscle stretch, with one hand anchored under thigh and the other to provide overpressure.  He was instructed to perform with submax tension, and to hold for 90 seconds.    Written Home Exercises Provided: Patient instructed to cont prior HEP. Exercises were reviewed and Nicolás was able to demonstrate them prior to the end of the session.  Nicolás demonstrated good  understanding of the education provided. See EMR under Patient Instructions for exercises provided during therapy sessions    ASSESSMENT     Nicolás has continued  (+) response to manual therapy.   Improved tissue softening to lower jawline after dynamic cupping.  His skin quality has improved, evidenced by decreased adherence to basement layer and decreased puckering at midline at graft site.  He has decreased soft tissue tension throughout his anterior neck, his SCM and jawline have persistent radiation therapy fibrosis which softens the  manual therapy, but returns bw PT visits, yet overall has decreased in density and tension.   He has been compliant with his home exercise program and compression use.       Nicolás Is progressing well towards his goals.   Pt prognosis is Good.     Patient will continue to benefit from skilled outpatient physical therapy to address the deficits listed in the problem list box on initial evaluation, provide patient/family education and to maximize patient's level of independence in the home and community environment.     Patient's spiritual, cultural and educational needs considered and patient agreeable to plan of care and goals.     Anticipated barriers to physical therapy: severity of fibrosis and scarring    Goals:   The following goals were discussed with the patient and patient is in agreement with them as to be addressed in the treatment plan.      Short Term Goals: (6 weeks)   Goals: Progressing / MET Date Established Date Assessed   1. Patient will demonstrate decreased localized lymphedema to the jawline by  palpation and visualization of improved symmetry left and right  PROGRESSING 05-  PROGRESSING 06-   2. Patient will demonstrate 100% knowledge of lymphedema precautions and signs of infection to allow for reduced lymphedema risk, infection risk, and/or exacerbation of condition.  PROGRESSING 05-  MET 05-   3. Patient or caregiver will perform self-bandaging techniques and/or wearing of compression garments to allow for lymphatic drainage support, skin elasticity, and reduction in shape and size of limb.  PROGRESSING 05-  MET 05-   4. Patient will perform self lymph drainage techniques to areas within reach to enhance lymphatic drainage and skin condition.  PROGRESSING 05-  MET 05-   5. Patient will tolerate daily activities with external foam chips compression/ bandaging to allow for lymphatic and venous support.  PROGRESSING 05-  MET  05-      Long Term Goals: (12  weeks)   Goals: Progressing / MET Date Established Date Assessed   1. Patient will show decreased total girth measurement in sum of measurements to head and neck by up to 2 cm  to allow for improved comfort. PROGRESSING 05-   PROGRESSING 06-   2. Patient will show reduction in density to mild/moderate or less with improved contour of limb to allow for cosmesis, symmetry, infection risk reduction, excursion of sift tissues for swallowing to clear secretions to protect the airway PROGRESSING 05-   PROGRESSING 06-   3. Patient to malinda/doff compression garment with daily compliance to assist in lymphedema management, skin elasticity, and tissue density PROGRESSING 05-  MET 05-   4. Patient to show improved postural awareness and alignment. PROGRESSING 05-  MET 06-   5. Patient to be independent and compliant with home exercise program to allow for increased function in affected limb. PROGRESSING 05-         PLAN     Continue Physical Therapy  2x weekly for 6 weeks Complete Decongestive Therapy:  Manual lymphatic drainage, Multilayered short stretch bandaging, Pneumatic compression, Therapeutic exercises, and Patient education as deemed necessary to achieve stated goals.    2.  RE-MEASURE    3.  Use scar tool and negative pressure to progress fibrosis management - ongoing    Ena Mott, PT CLT   06-

## 2023-07-01 ENCOUNTER — PATIENT MESSAGE (OUTPATIENT)
Dept: HEMATOLOGY/ONCOLOGY | Facility: CLINIC | Age: 73
End: 2023-07-01

## 2023-07-03 ENCOUNTER — CLINICAL SUPPORT (OUTPATIENT)
Dept: REHABILITATION | Facility: HOSPITAL | Age: 73
End: 2023-07-03
Payer: MEDICARE

## 2023-07-03 DIAGNOSIS — I89.0 LYMPHEDEMA DUE TO RADIATION: Primary | ICD-10-CM

## 2023-07-03 DIAGNOSIS — R29.898 DECREASED ROM OF NECK: ICD-10-CM

## 2023-07-03 DIAGNOSIS — L90.5 SCAR CONDITIONS AND FIBROSIS OF SKIN: ICD-10-CM

## 2023-07-03 PROCEDURE — 97140 MANUAL THERAPY 1/> REGIONS: CPT | Mod: PN

## 2023-07-03 NOTE — PROGRESS NOTES
[facility]   Physical Therapy Treatment Note   Lymphedema - Head and Neck    Name: Cyrus Batres Jr.  Clinic Number: [id number]    Therapy Diagnosis:   Encounter Diagnoses   Name Primary?    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    Scar conditions and fibrosis of skin     Decreased ROM of neck          Physician: Matheus Portillo Jr., MD    Visit Date: 7/3/2023    Physician Orders: PT Eval and Treat   Medical Diagnosis from Referral: Malignant neoplasm of base of tongue  - Primary   Evaluation Date: 5/8/2023  Authorization Period Expiration: 06-  Plan of Care Expiration: 08-  Progress Note Due: v 10  Visit # / Visits authorized: 12/ 30   FOTO: not completed       Time In: 1505  Time Out: 1555  Total Billable Time: 45 minutes    SUBJECTIVE     Nicolás reports: He reports that continues to use the kinesiotaping, and does his self massage.  He believes that he has benefitted from skilled PT and wishes to continue with treatment.  He states that his shoulders felt looser after treatment today.    He states that he has been doing his neck muscle stretches with overpressure daily.     Nicolás reported wearing compression outside of therapy visits: Yes - to anterior neck as instructed at initial evaluation  Nicolás was compliant with home exercise program.    Response to previous treatment: excellent  Functional change: improvement in skin quality and cervical spine range of motion     Pain: 0/10  Location: bilateral anterior neck      OBJECTIVE     Girth Measurements:     Girth Measurements (in centimeters)  LANDMARK Head/Neck Measurement Face  Right Face Left    Diagonal Head Circumference 55       Vertical Submental Circumference         Mandibular Angle to Mandibular Angle 27.5       Tragus Angle to Tragus Angle 32.0       Neck Superior 37.5       Neck Middle 34.0       Neck Inferior  34.5       Tragus to Mental Protuberance         Tragus to Mouth Angle         Mandibular Angle to Nasal  Wing         Mandibular Angle to Internal Eye Corner         Mandibular Angle to External Eye Corner         Mental Protuberance to Internal Eye Corner         Mandibular Angle to Mental Protuberance         Totals         Total girth measurement-   220.5 cm       Total Girth Reduction                   Treatment     Nicolás received the treatments listed below:      Patient received from waiting room.     Palpation/Inspection:  Improved skin glide throughout, noted softening of the fibrotic tissues under his chin and along the jaw line.    He has increased active range of motion  to his cervical spine, improving his capacity to look over his shoulder and overhead.      manual therapy techniques: Complete Decongestive Therapy was applied to the: head and neck for 45 minutes, including: manual lymphatic drainage and short stretch compression bandaging     Treatment forcus on decreasing fibrosis and scarring to anterior neck in sx and radiation therapy zone     MANUAL LYMPHATIC DRAINAGE:   Short neck  Prom with end range of motion prolonged passive stretch to anterior chest, shoulder depression  Prolonged passive stretch to left and right rosa-lateral neck soft tissues, x 90 sec each   Long neck  Anterior neck routed posterior to the radiation therapy zone and then to terminus  Fibrosis techniques to scar and fibrosis at anterior neck/radiation therapy zone - bulk of treatment  Mobilization,  cartilages in anterior neck and trachea  S deformation and elongation of SCM and anterior strap muscle  PPS to left and right SCM with overpressure  Redo long neck  Deep breathing  Gentle cupping with small rigid cups - used pulsating  technique to increase soft tissue extensibility along the left and right jaw line, with care to avoid anterior neck over vessels. Not completed this date  Scar tissue management using (-) pressure to horizontal scar from sx, along lower jaw line, with care to avoid over vascular  structures      During scar mobilizations, patient sustained very small skin tear to distal pole on left jawline scar.  Area was washed and cleansed, sustained pressure applied.  Patient declined further first aid or referral to urgent care.      Patient Education and Home Exercises       Home Exercises and Patient Education Provided     Education provided:   - Educated in self massage to abdominal areas, and cervical region  - Patient to leave short-stretch compression bandages intact for 12-24 hours as tolerated, discontinue with any problems, return rolled bandages next session. Wash and wear schedules confirmed.   - Continue HEP of AROM, stretching, and postural correction.   - Educated on self or assisted bandaging, compression options, and risk reduction    06-:   Patient was demonstrated and practices lateral cervical muscle stretch, with one hand anchored under thigh and the other to provide overpressure.  He was instructed to perform with submax tension, and to hold for 90 seconds.    Written Home Exercises Provided: Patient instructed to cont prior HEP. Exercises were reviewed and Nicolás was able to demonstrate them prior to the end of the session.  Nicolás demonstrated good  understanding of the education provided. See EMR under Patient Instructions for exercises provided during therapy sessions    ASSESSMENT     Nicolás has continued  (+) response to manual therapy.  His skin quality has improved, evidenced by decreased adherence to basement layer and decreased puckering at midline at graft site.  He has decreased soft tissue tension throughout his anterior neck, his SCM and jawline have persistent radiation therapy fibrosis which softens the manual therapy, but returns bw PT visits, yet overall has decreased in density and tension.      Nicolás Is progressing well towards his goals.   Pt prognosis is Good.     Patient will continue to benefit from skilled outpatient physical therapy to address the  deficits listed in the problem list box on initial evaluation, provide patient/family education and to maximize patient's level of independence in the home and community environment.     Patient's spiritual, cultural and educational needs considered and patient agreeable to plan of care and goals.     Anticipated barriers to physical therapy: severity of fibrosis and scarring    Goals:   The following goals were discussed with the patient and patient is in agreement with them as to be addressed in the treatment plan.      Short Term Goals: (6 weeks)   Goals: Progressing / MET Date Established Date Assessed   1. Patient will demonstrate decreased localized lymphedema to the jawline by  palpation and visualization of improved symmetry left and right  PROGRESSING 05-  PROGRESSING 06-   2. Patient will demonstrate 100% knowledge of lymphedema precautions and signs of infection to allow for reduced lymphedema risk, infection risk, and/or exacerbation of condition.  PROGRESSING 05-  MET 05-   3. Patient or caregiver will perform self-bandaging techniques and/or wearing of compression garments to allow for lymphatic drainage support, skin elasticity, and reduction in shape and size of limb.  PROGRESSING 05-  MET 05-   4. Patient will perform self lymph drainage techniques to areas within reach to enhance lymphatic drainage and skin condition.  PROGRESSING 05-  MET 05-   5. Patient will tolerate daily activities with external foam chips compression/ bandaging to allow for lymphatic and venous support.  PROGRESSING 05-  MET 05-      Long Term Goals: (12  weeks)   Goals: Progressing / MET Date Established Date Assessed   1. Patient will show decreased total girth measurement in sum of measurements to head and neck by up to 2 cm  to allow for improved comfort. PROGRESSING 05-   PROGRESSING 06-   2. Patient will show reduction in density to  mild/moderate or less with improved contour of limb to allow for cosmesis, symmetry, infection risk reduction, excursion of sift tissues for swallowing to clear secretions to protect the airway PROGRESSING 05-   PROGRESSING 06-   3. Patient to malinda/doff compression garment with daily compliance to assist in lymphedema management, skin elasticity, and tissue density PROGRESSING 05-  MET 05-   4. Patient to show improved postural awareness and alignment. PROGRESSING 05-  MET 06-   5. Patient to be independent and compliant with home exercise program to allow for increased function in affected limb. PROGRESSING 05-         PLAN     Continue Physical Therapy  2x weekly for 6 weeks Complete Decongestive Therapy:  Manual lymphatic drainage, Multilayered short stretch bandaging, Pneumatic compression, Therapeutic exercises, and Patient education as deemed necessary to achieve stated goals.    2.  RE-MEASURE    3.  Use scar tool and negative pressure to progress fibrosis management - ongoing    Ena Mott, PT CLT   06-

## 2023-07-05 ENCOUNTER — CLINICAL SUPPORT (OUTPATIENT)
Dept: REHABILITATION | Facility: HOSPITAL | Age: 73
End: 2023-07-05
Payer: MEDICARE

## 2023-07-05 ENCOUNTER — INFUSION (OUTPATIENT)
Dept: INFUSION THERAPY | Facility: HOSPITAL | Age: 73
End: 2023-07-05
Attending: INTERNAL MEDICINE
Payer: MEDICARE

## 2023-07-05 VITALS
TEMPERATURE: 97 F | DIASTOLIC BLOOD PRESSURE: 66 MMHG | OXYGEN SATURATION: 100 % | BODY MASS INDEX: 21.18 KG/M2 | HEART RATE: 69 BPM | SYSTOLIC BLOOD PRESSURE: 110 MMHG | RESPIRATION RATE: 17 BRPM | WEIGHT: 131.81 LBS | HEIGHT: 66 IN

## 2023-07-05 DIAGNOSIS — E86.0 DEHYDRATION: ICD-10-CM

## 2023-07-05 DIAGNOSIS — C32.9 LARYNX CANCER: ICD-10-CM

## 2023-07-05 DIAGNOSIS — L90.5 SCAR CONDITIONS AND FIBROSIS OF SKIN: ICD-10-CM

## 2023-07-05 DIAGNOSIS — I89.0 LYMPHEDEMA DUE TO RADIATION: Primary | ICD-10-CM

## 2023-07-05 DIAGNOSIS — C01 MALIGNANT NEOPLASM OF BASE OF TONGUE: Primary | ICD-10-CM

## 2023-07-05 DIAGNOSIS — R29.898 DECREASED ROM OF NECK: ICD-10-CM

## 2023-07-05 PROCEDURE — 25000003 PHARM REV CODE 250: Performed by: INTERNAL MEDICINE

## 2023-07-05 PROCEDURE — 63600175 PHARM REV CODE 636 W HCPCS: Performed by: INTERNAL MEDICINE

## 2023-07-05 PROCEDURE — 97110 THERAPEUTIC EXERCISES: CPT | Mod: PN

## 2023-07-05 PROCEDURE — 97140 MANUAL THERAPY 1/> REGIONS: CPT | Mod: PN

## 2023-07-05 PROCEDURE — A4216 STERILE WATER/SALINE, 10 ML: HCPCS | Performed by: INTERNAL MEDICINE

## 2023-07-05 PROCEDURE — 96523 IRRIG DRUG DELIVERY DEVICE: CPT

## 2023-07-05 RX ORDER — SODIUM CHLORIDE 0.9 % (FLUSH) 0.9 %
10 SYRINGE (ML) INJECTION
Status: DISCONTINUED | OUTPATIENT
Start: 2023-07-05 | End: 2023-07-05 | Stop reason: HOSPADM

## 2023-07-05 RX ORDER — SODIUM CHLORIDE 0.9 % (FLUSH) 0.9 %
10 SYRINGE (ML) INJECTION
Status: CANCELLED | OUTPATIENT
Start: 2023-07-05

## 2023-07-05 RX ORDER — HEPARIN 100 UNIT/ML
500 SYRINGE INTRAVENOUS
Status: DISCONTINUED | OUTPATIENT
Start: 2023-07-05 | End: 2023-07-05 | Stop reason: HOSPADM

## 2023-07-05 RX ORDER — HEPARIN 100 UNIT/ML
500 SYRINGE INTRAVENOUS
Status: CANCELLED | OUTPATIENT
Start: 2023-07-05

## 2023-07-05 RX ADMIN — HEPARIN 500 UNITS: 100 SYRINGE at 09:07

## 2023-07-05 RX ADMIN — SODIUM CHLORIDE, PRESERVATIVE FREE 10 ML: 5 INJECTION INTRAVENOUS at 09:07

## 2023-07-05 NOTE — PROGRESS NOTES
[facility]   Physical Therapy Treatment Note   Lymphedema - Head and Neck    Name: Cyrus Batres Jr.  Clinic Number: [id number]    Therapy Diagnosis:   Encounter Diagnoses   Name Primary?    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  He indicated that central scar at upper right margin of graft site is very tight.  He states that his fluid level continue to go up and down, with overall improvement in his symptoms.     Nicolás reported wearing compression outside of therapy visits: Yes - to anterior neck as instructed at initial evaluation  Nicolás was compliant with home exercise program.    Response to previous treatment: excellent  Functional change: improvement in skin quality and cervical spine range of motion     Pain: 0/10  Location: bilateral anterior neck      OBJECTIVE     Girth Measurements:     Girth Measurements (in centimeters)  LANDMARK Head/Neck Measurement  05/ / 07- Face  Right Face Left    Diagonal Head Circumference 55.0 cm / 55.0 cm       Vertical Submental Circumference         Mandibular Angle to Mandibular Angle 27.5 cm /  25.0 cm       Tragus Angle to Tragus Angle 32.0 cm / 32.1 cm       Neck Superior 37.5 cm /  36.0 cm       Neck Middle 34.0 cm / 33.0 cm       Neck Inferior  34.5 cm / 34.3 cm       Tragus to Mental Protuberance         Tragus to Mouth Angle        "  Mandibular Angle to Nasal Wing         Mandibular Angle to Internal Eye Corner         Mandibular Angle to External Eye Corner         Mental Protuberance to Internal Eye Corner         Mandibular Angle to Mental Protuberance         Totals         Total girth measurement-   220.5 cm / 215.4 cm       Total Girth Reduction   (-) 5.1 cm                Treatment     Nicolás received the treatments listed below:      Patient received from waiting room.     Palpation/Inspection:  Improved skin glide throughout, noted softening of the fibrotic tissues under his chin and along the jaw line.    He has increased active range of motion  to his cervical spine, improving his capacity to look over his shoulder and overhead.      manual therapy techniques: Complete Decongestive Therapy was applied to the: head and neck for 45 minutes, including: manual lymphatic drainage and short stretch compression bandaging     Treatment forcus on decreasing fibrosis and scarring to anterior neck in sx and radiation therapy zone     MANUAL LYMPHATIC DRAINAGE:   Short neck  Prom with end range of motion prolonged passive stretch to anterior chest, shoulder depression  Prolonged passive stretch to left and right rosa-lateral neck soft tissues, x 90 sec each   Long neck  Anterior neck routed posterior to the radiation therapy zone and then to terminus  Fibrosis techniques to scar and fibrosis at anterior neck/radiation therapy zone right of center, using low range prolonged passive stretch with fingertips - bulk of treatment  Mobilization,  cartilages in anterior neck and trachea - NOT completed  S deformation and elongation of SCM and anterior strap muscle  PPS to left and right SCM, shoulder depression with overpressure  Redo long neck  Deep breathing    Kinesiotaping small "I" strip along right scar from left edge of graft, across midline to mandibular angle; skin prep applied prior.  50% tension.   Patient issued tape to redo himself until " next visit with instruction on technique used.    Therapeutic exercise x 15 mins for upper body and postural muscle strengthening, activation of muscle and joint pumps.    UBE clockwise/counterclockwise level 1.5 x 8 mins total  Horizontal and vertical purple theraband scapular wall slides, left and right, x 15 each  Shoulder extension, bilateral with 5# bar  Standing purple theraband external rotation, left and right x 10 each.  Difficulty on the left 2' muscle weakness  Standing scaption, bilateral, to > 90' , 1# x 10    Patient Education and Home Exercises       Home Exercises and Patient Education Provided     Education provided:   - Educated in self massage to abdominal areas, and cervical region  - Patient to leave short-stretch compression bandages intact for 12-24 hours as tolerated, discontinue with any problems, return rolled bandages next session. Wash and wear schedules confirmed.   - Continue HEP of AROM, stretching, and postural correction.   - Educated on self or assisted bandaging, compression options, and risk reduction    06-:   Patient was demonstrated and practices lateral cervical muscle stretch, with one hand anchored under thigh and the other to provide overpressure.  He was instructed to perform with submax tension, and to hold for 90 seconds.    Written Home Exercises Provided: Patient instructed to cont prior HEP. Exercises were reviewed and Nicolás was able to demonstrate them prior to the end of the session.  Nicolás demonstrated good  understanding of the education provided. See EMR under Patient Instructions for exercises provided during therapy sessions    ASSESSMENT     Nicolás with decreased sum of girths, most notable at jawline and superior neck measurements, evidencing (+) response to skilled PT.   He continues to make progress towards goals attainment.       Nicolás Is progressing well towards his goals.   Pt prognosis is Good.     Patient will continue to benefit from skilled  outpatient physical therapy to address the deficits listed in the problem list box on initial evaluation, provide patient/family education and to maximize patient's level of independence in the home and community environment.     Patient's spiritual, cultural and educational needs considered and patient agreeable to plan of care and goals.     Anticipated barriers to physical therapy: severity of fibrosis and scarring    Goals:   The following goals were discussed with the patient and patient is in agreement with them as to be addressed in the treatment plan.      Short Term Goals: (6 weeks)   Goals: Progressing / MET Date Established Date Assessed   1. Patient will demonstrate decreased localized lymphedema to the jawline by  palpation and visualization of improved symmetry left and right  PROGRESSING 05-  PROGRESSING 06-   2. Patient will demonstrate 100% knowledge of lymphedema precautions and signs of infection to allow for reduced lymphedema risk, infection risk, and/or exacerbation of condition.  PROGRESSING 05-  MET 05-   3. Patient or caregiver will perform self-bandaging techniques and/or wearing of compression garments to allow for lymphatic drainage support, skin elasticity, and reduction in shape and size of limb.  PROGRESSING 05-  MET 05-   4. Patient will perform self lymph drainage techniques to areas within reach to enhance lymphatic drainage and skin condition.  PROGRESSING 05-  MET 05-   5. Patient will tolerate daily activities with external foam chips compression/ bandaging to allow for lymphatic and venous support.  PROGRESSING 05-  MET 05-      Long Term Goals: (12  weeks)   Goals: Progressing / MET Date Established Date Assessed   1. Patient will show decreased total girth measurement in sum of measurements to head and neck by up to 2 cm  to allow for improved comfort. PROGRESSING 05-  MET 07-   2. Patient will  show reduction in density to mild/moderate or less with improved contour of limb to allow for cosmesis, symmetry, infection risk reduction, excursion of sift tissues for swallowing to clear secretions to protect the airway PROGRESSING 05-   PROGRESSING 06-   3. Patient to malinda/doff compression garment with daily compliance to assist in lymphedema management, skin elasticity, and tissue density PROGRESSING 05-  MET 05-   4. Patient to show improved postural awareness and alignment. PROGRESSING 05-  MET 06-   5. Patient to be independent and compliant with home exercise program to allow for increased function in affected limb. PROGRESSING 05-         PLAN     Continue Physical Therapy  2x weekly for 6 weeks Complete Decongestive Therapy:  Manual lymphatic drainage, Multilayered short stretch bandaging, Pneumatic compression, Therapeutic exercises, and Patient education as deemed necessary to achieve stated goals.    2.  RE-MEASURE - completed    3. Continue to progress remedial therapeutic exercise, re-assess bilateral shoulder active range of motion     Ena Mott, PT CLT   06-

## 2023-07-05 NOTE — PLAN OF CARE
Problem: Fatigue  Goal: Improved Activity Tolerance  Outcome: Ongoing, Progressing  Intervention: Promote Improved Energy  Flowsheets (Taken 7/5/2023 0904)  Fatigue Management: fatigue-related activity identified  Sleep/Rest Enhancement: noise level reduced

## 2023-07-10 ENCOUNTER — PATIENT MESSAGE (OUTPATIENT)
Dept: HEMATOLOGY/ONCOLOGY | Facility: CLINIC | Age: 73
End: 2023-07-10

## 2023-07-10 ENCOUNTER — CLINICAL SUPPORT (OUTPATIENT)
Dept: REHABILITATION | Facility: HOSPITAL | Age: 73
End: 2023-07-10
Payer: MEDICARE

## 2023-07-10 DIAGNOSIS — L90.5 SCAR CONDITIONS AND FIBROSIS OF SKIN: ICD-10-CM

## 2023-07-10 DIAGNOSIS — I89.0 LYMPHEDEMA DUE TO RADIATION: Primary | ICD-10-CM

## 2023-07-10 DIAGNOSIS — R29.898 DECREASED ROM OF NECK: ICD-10-CM

## 2023-07-10 PROCEDURE — 97140 MANUAL THERAPY 1/> REGIONS: CPT | Mod: PN

## 2023-07-10 PROCEDURE — 97110 THERAPEUTIC EXERCISES: CPT | Mod: PN

## 2023-07-10 NOTE — PROGRESS NOTES
[facility]   Physical Therapy Treatment Note   Lymphedema - Head and Neck    Name: Cyrus Batres Jr.  Clinic Number: [id number]    Therapy Diagnosis:   Encounter Diagnoses   Name Primary?    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    Scar conditions and fibrosis of skin     Decreased ROM of neck            Physician: Matheus Portillo Jr., MD    Visit Date: 7/10/2023    Physician Orders: PT Eval and Treat   Medical Diagnosis from Referral: Malignant neoplasm of base of tongue  - Primary   Evaluation Date: 5/8/2023  Authorization Period Expiration: 06-  Plan of Care Expiration: 08-  Progress Note Due: v 10  Visit # / Visits authorized: 14/ 30   FOTO: not completed       Time In: 1500   Time Out: 1555  Total Billable Time: 55 minutes    SUBJECTIVE     Nicolás reports: He reports he continues to do well and note improvements.  He felt decreased tension to the surgical scar and graft site on the right after targeted scar mobilization last treatment, yet it returned over the weekend.   He tolerated addition of remedial therapeutic exercise well.    Nicolás reported wearing compression outside of therapy visits: Yes - to anterior neck as instructed at initial evaluation  Nicolás was compliant with home exercise program.    Response to previous treatment: excellent  Functional change: improvement in skin quality and cervical spine range of motion     Pain: 0/10  Location: bilateral anterior neck      OBJECTIVE     Girth Measurements:     Girth Measurements (in centimeters)  LANDMARK Head/Neck Measurement  05/ / 07- Face  Right Face Left    Diagonal Head Circumference 55.0 cm / 55.0 cm       Vertical Submental Circumference         Mandibular Angle to Mandibular Angle 27.5 cm /  25.0 cm       Tragus Angle to Tragus Angle 32.0 cm / 32.1 cm       Neck Superior 37.5 cm /  36.0 cm       Neck Middle 34.0 cm / 33.0 cm       Neck Inferior  34.5 cm / 34.3 cm       Tragus to Mental  Protuberance         Tragus to Mouth Angle         Mandibular Angle to Nasal Wing         Mandibular Angle to Internal Eye Corner         Mandibular Angle to External Eye Corner         Mental Protuberance to Internal Eye Corner         Mandibular Angle to Mental Protuberance         Totals         Total girth measurement-   220.5 cm / 215.4 cm       Total Girth Reduction   (-) 5.1 cm                Treatment     Nicolás received the treatments listed below:      Patient received from waiting room.     Palpation/Inspection:  Improved skin glide throughout, noted softening of the fibrotic tissues under his chin and along the jaw line.    He has increased active range of motion  to his cervical spine, improving his capacity to look over his shoulder and overhead.      manual therapy techniques: Complete Decongestive Therapy was applied to the: head and neck for 45 minutes, including: manual lymphatic drainage and short stretch compression bandaging     Treatment forcus on decreasing fibrosis and scarring to anterior neck in sx and radiation therapy zone     MANUAL LYMPHATIC DRAINAGE:   Short neck  Prom with end range of motion prolonged passive stretch to anterior chest, shoulder depression  Prolonged passive stretch to left and right rosa-lateral neck soft tissues, x 90 sec each   Long neck  Anterior neck routed posterior to the radiation therapy zone and then to terminus  Fibrosis techniques to scar and fibrosis at anterior neck/radiation therapy zone right of center, using low range prolonged passive stretch with fingertips - bulk of treatment  Mobilization,  cartilages in anterior neck and trachea - NOT completed  S deformation and elongation of SCM and anterior strap muscle  PPS to left and right SCM, shoulder depression with overpressure  Redo long neck  Deep breathing    Therapeutic exercise x 15 mins for upper body and postural muscle strengthening, activation of muscle and joint pumps.    UBE  clockwise/counterclockwise level 1.5 x 8 mins total  Standing cervical retraction, ball behind head, 2 x 10  Horizontal and vertical purple theraband scapular wall slides, left and right, x 15 each  Shoulder extension, bilateral with 5# bar  Standing purple theraband external rotation, left and right x 10 each.  Difficulty on the left 2' muscle weakness  Standing scaption, bilateral, to > 90' , 1# x 10  Standing lateral pulls, bilateral, purple x 20      Patient Education and Home Exercises       Home Exercises and Patient Education Provided     Education provided:   - Educated in self massage to abdominal areas, and cervical region  - Patient to leave short-stretch compression bandages intact for 12-24 hours as tolerated, discontinue with any problems, return rolled bandages next session. Wash and wear schedules confirmed.   - Continue HEP of AROM, stretching, and postural correction.   - Educated on self or assisted bandaging, compression options, and risk reduction    06-:   Patient was demonstrated and practices lateral cervical muscle stretch, with one hand anchored under thigh and the other to provide overpressure.  He was instructed to perform with submax tension, and to hold for 90 seconds.    Written Home Exercises Provided: Patient instructed to cont prior HEP. Exercises were reviewed and Nicolás was able to demonstrate them prior to the end of the session.  Nicolás demonstrated good  understanding of the education provided. See EMR under Patient Instructions for exercises provided during therapy sessions    ASSESSMENT     Nicolás remains engaged and motivated.  He has overall decreased sum of girths, evidencing symptoms improvement.  He continues to have rebound filling and tightening at the right SCM between visits.  He reports ongoing compliance with home exercise program.    Nicolás Is progressing well towards his goals.   Pt prognosis is Good.     Patient will continue to benefit from skilled  outpatient physical therapy to address the deficits listed in the problem list box on initial evaluation, provide patient/family education and to maximize patient's level of independence in the home and community environment.     Patient's spiritual, cultural and educational needs considered and patient agreeable to plan of care and goals.     Anticipated barriers to physical therapy: severity of fibrosis and scarring    Goals:   The following goals were discussed with the patient and patient is in agreement with them as to be addressed in the treatment plan.      Short Term Goals: (6 weeks)   Goals: Progressing / MET Date Established Date Assessed   1. Patient will demonstrate decreased localized lymphedema to the jawline by  palpation and visualization of improved symmetry left and right  PROGRESSING 05-  PROGRESSING 06-   2. Patient will demonstrate 100% knowledge of lymphedema precautions and signs of infection to allow for reduced lymphedema risk, infection risk, and/or exacerbation of condition.  PROGRESSING 05-  MET 05-   3. Patient or caregiver will perform self-bandaging techniques and/or wearing of compression garments to allow for lymphatic drainage support, skin elasticity, and reduction in shape and size of limb.  PROGRESSING 05-  MET 05-   4. Patient will perform self lymph drainage techniques to areas within reach to enhance lymphatic drainage and skin condition.  PROGRESSING 05-  MET 05-   5. Patient will tolerate daily activities with external foam chips compression/ bandaging to allow for lymphatic and venous support.  PROGRESSING 05-  MET 05-      Long Term Goals: (12  weeks)   Goals: Progressing / MET Date Established Date Assessed   1. Patient will show decreased total girth measurement in sum of measurements to head and neck by up to 2 cm  to allow for improved comfort. PROGRESSING 05-  MET 07-   2. Patient will  show reduction in density to mild/moderate or less with improved contour of limb to allow for cosmesis, symmetry, infection risk reduction, excursion of sift tissues for swallowing to clear secretions to protect the airway PROGRESSING 05-   PROGRESSING 06-   3. Patient to malinda/doff compression garment with daily compliance to assist in lymphedema management, skin elasticity, and tissue density PROGRESSING 05-  MET 05-   4. Patient to show improved postural awareness and alignment. PROGRESSING 05-  MET 06-   5. Patient to be independent and compliant with home exercise program to allow for increased function in affected limb. PROGRESSING 05-         PLAN     Continue Physical Therapy  2x weekly for 6 weeks Complete Decongestive Therapy:  Manual lymphatic drainage, Multilayered short stretch bandaging, Pneumatic compression, Therapeutic exercises, and Patient education as deemed necessary to achieve stated goals.    2.  RE-MEASURE - completed    3. Continue to progress remedial therapeutic exercise, re-assess bilateral shoulder active range of motion     Ena Mott, PT CLT   06-

## 2023-07-14 ENCOUNTER — LAB VISIT (OUTPATIENT)
Dept: LAB | Facility: HOSPITAL | Age: 73
End: 2023-07-14
Attending: NURSE PRACTITIONER
Payer: MEDICARE

## 2023-07-14 DIAGNOSIS — C01 MALIGNANT NEOPLASM OF BASE OF TONGUE: ICD-10-CM

## 2023-07-14 LAB
ALBUMIN SERPL BCP-MCNC: 4 G/DL (ref 3.5–5.2)
ALP SERPL-CCNC: 201 U/L (ref 55–135)
ALT SERPL W/O P-5'-P-CCNC: 38 U/L (ref 10–44)
ANION GAP SERPL CALC-SCNC: 9 MMOL/L (ref 8–16)
AST SERPL-CCNC: 37 U/L (ref 10–40)
BASOPHILS # BLD AUTO: 0.01 K/UL (ref 0–0.2)
BASOPHILS NFR BLD: 0.2 % (ref 0–1.9)
BILIRUB SERPL-MCNC: 1.1 MG/DL (ref 0.1–1)
BUN SERPL-MCNC: 30 MG/DL (ref 8–23)
CALCIUM SERPL-MCNC: 7.7 MG/DL (ref 8.7–10.5)
CHLORIDE SERPL-SCNC: 97 MMOL/L (ref 95–110)
CO2 SERPL-SCNC: 27 MMOL/L (ref 23–29)
CREAT SERPL-MCNC: 1.1 MG/DL (ref 0.5–1.4)
DIFFERENTIAL METHOD: ABNORMAL
EOSINOPHIL # BLD AUTO: 0.2 K/UL (ref 0–0.5)
EOSINOPHIL NFR BLD: 3.6 % (ref 0–8)
ERYTHROCYTE [DISTWIDTH] IN BLOOD BY AUTOMATED COUNT: 12.6 % (ref 11.5–14.5)
EST. GFR  (NO RACE VARIABLE): >60 ML/MIN/1.73 M^2
GLUCOSE SERPL-MCNC: 158 MG/DL (ref 70–110)
HCT VFR BLD AUTO: 34 % (ref 40–54)
HGB BLD-MCNC: 11.1 G/DL (ref 14–18)
IMM GRANULOCYTES # BLD AUTO: 0.04 K/UL (ref 0–0.04)
IMM GRANULOCYTES NFR BLD AUTO: 0.8 % (ref 0–0.5)
LYMPHOCYTES # BLD AUTO: 0.6 K/UL (ref 1–4.8)
LYMPHOCYTES NFR BLD: 12.7 % (ref 18–48)
MCH RBC QN AUTO: 30.8 PG (ref 27–31)
MCHC RBC AUTO-ENTMCNC: 32.6 G/DL (ref 32–36)
MCV RBC AUTO: 94 FL (ref 82–98)
MONOCYTES # BLD AUTO: 0.3 K/UL (ref 0.3–1)
MONOCYTES NFR BLD: 5.8 % (ref 4–15)
NEUTROPHILS # BLD AUTO: 3.9 K/UL (ref 1.8–7.7)
NEUTROPHILS NFR BLD: 76.9 % (ref 38–73)
NRBC BLD-RTO: 0 /100 WBC
PLATELET # BLD AUTO: 150 K/UL (ref 150–450)
PMV BLD AUTO: 10.6 FL (ref 9.2–12.9)
POTASSIUM SERPL-SCNC: 3.6 MMOL/L (ref 3.5–5.1)
PROT SERPL-MCNC: 7 G/DL (ref 6–8.4)
RBC # BLD AUTO: 3.6 M/UL (ref 4.6–6.2)
SODIUM SERPL-SCNC: 133 MMOL/L (ref 136–145)
WBC # BLD AUTO: 5.03 K/UL (ref 3.9–12.7)

## 2023-07-14 PROCEDURE — 85025 COMPLETE CBC W/AUTO DIFF WBC: CPT | Performed by: NURSE PRACTITIONER

## 2023-07-14 PROCEDURE — 80053 COMPREHEN METABOLIC PANEL: CPT | Performed by: NURSE PRACTITIONER

## 2023-07-14 PROCEDURE — 36415 COLL VENOUS BLD VENIPUNCTURE: CPT | Performed by: NURSE PRACTITIONER

## 2023-07-17 ENCOUNTER — CLINICAL SUPPORT (OUTPATIENT)
Dept: REHABILITATION | Facility: HOSPITAL | Age: 73
End: 2023-07-17
Payer: MEDICARE

## 2023-07-17 DIAGNOSIS — R29.898 DECREASED ROM OF NECK: ICD-10-CM

## 2023-07-17 DIAGNOSIS — L90.5 SCAR CONDITIONS AND FIBROSIS OF SKIN: ICD-10-CM

## 2023-07-17 DIAGNOSIS — I89.0 LYMPHEDEMA DUE TO RADIATION: Primary | ICD-10-CM

## 2023-07-17 PROCEDURE — 97140 MANUAL THERAPY 1/> REGIONS: CPT | Mod: PN

## 2023-07-17 NOTE — PROGRESS NOTES
[facility]   Physical Therapy Treatment Note   Lymphedema - Head and Neck    Name: Cyrus Batres Jr.  Clinic Number: [id number]    Therapy Diagnosis:   Encounter Diagnoses   Name Primary?    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  He reports that he worked in his garden over the weekend and his arms were sore.     Nicolás reported wearing compression outside of therapy visits: Yes - to anterior neck as instructed at initial evaluation  Nicolás was compliant with home exercise program.    Response to previous treatment: excellent  Functional change: improvement in skin quality and cervical spine range of motion     Pain: 0/10  Location: bilateral anterior neck      OBJECTIVE     Girth Measurements:     Girth Measurements (in centimeters)  LANDMARK Head/Neck Measurement  05/ / 07- Face  Right Face Left    Diagonal Head Circumference 55.0 cm / 55.0 cm       Vertical Submental Circumference         Mandibular Angle to Mandibular Angle 27.5 cm /  25.0 cm       Tragus Angle to Tragus Angle 32.0 cm / 32.1 cm       Neck Superior 37.5 cm /  36.0 cm       Neck Middle 34.0 cm / 33.0 cm       Neck Inferior  34.5 cm / 34.3 cm       Tragus to Mental Protuberance         Tragus to Mouth Angle         Mandibular Angle to Nasal Wing         Mandibular Angle to Internal Eye Corner         Mandibular  Angle to External Eye Corner         Mental Protuberance to Internal Eye Corner         Mandibular Angle to Mental Protuberance         Totals         Total girth measurement-   220.5 cm / 215.4 cm       Total Girth Reduction   (-) 5.1 cm                Treatment     Nicolás received the treatments listed below:      Patient received from waiting room.     Palpation/Inspection:  Improved skin glide throughout, noted softening of the fibrotic tissues under his chin and along the jaw line.    He has increased active range of motion  to his cervical spine, improving his capacity to look over his shoulder and overhead.      manual therapy techniques: Complete Decongestive Therapy was applied to the: head and neck for 45 minutes, including: manual lymphatic drainage and short stretch compression bandaging     Treatment forcus on decreasing fibrosis and scarring to anterior neck in sx and radiation therapy zone     MANUAL LYMPHATIC DRAINAGE:   Short neck  Prom with end range of motion prolonged passive stretch to anterior chest, shoulder depression  Prolonged passive stretch to left and right rosa-lateral neck soft tissues, x 90 sec each   Long neck  Anterior neck routed posterior to the radiation therapy zone and then to terminus  Fibrosis techniques to scar and fibrosis at anterior neck/radiation therapy zone right of center, using low range prolonged passive stretch with fingertips - bulk of treatment  Mobilization,  cartilages in anterior neck and trachea - NOT completed  S deformation and elongation of SCM and anterior strap muscle  PPS to left and right SCM, shoulder depression with overpressure  Redo long neck  Deep breathing    Therapeutic exercise x 0 mins for upper body and postural muscle strengthening, activation of muscle and joint pumps.  Deferred 2' soreness from this weekend.    UBE clockwise/counterclockwise level 1.5 x 8 mins total  Standing cervical retraction, ball behind head, 2 x 10  Horizontal and  vertical purple theraband scapular wall slides, left and right, x 15 each  Shoulder extension, bilateral with 5# bar  Standing purple theraband external rotation, left and right x 10 each.  Difficulty on the left 2' muscle weakness  Standing scaption, bilateral, to > 90' , 1# x 10  Standing lateral pulls, bilateral, purple x 20      Patient Education and Home Exercises       Home Exercises and Patient Education Provided     Education provided:   - Educated in self massage to abdominal areas, and cervical region  - Patient to leave short-stretch compression bandages intact for 12-24 hours as tolerated, discontinue with any problems, return rolled bandages next session. Wash and wear schedules confirmed.   - Continue HEP of AROM, stretching, and postural correction.   - Educated on self or assisted bandaging, compression options, and risk reduction    06-:   Patient was demonstrated and practices lateral cervical muscle stretch, with one hand anchored under thigh and the other to provide overpressure.  He was instructed to perform with submax tension, and to hold for 90 seconds.    Written Home Exercises Provided: Patient instructed to cont prior HEP. Exercises were reviewed and Nicolás was able to demonstrate them prior to the end of the session.  Nicolás demonstrated good  understanding of the education provided. See EMR under Patient Instructions for exercises provided during therapy sessions    ASSESSMENT     Nicolás remains engaged and motivated and notes that his symptoms are better since initiating skilled PT.  He continues to have rebound filling and tightening at the right SCM between visits.  He reports ongoing compliance with home exercise program, including night use of foam chips compression.     Nicolás Is progressing well towards his goals.   Pt prognosis is Good.     Patient will continue to benefit from skilled outpatient physical therapy to address the deficits listed in the problem list box on  initial evaluation, provide patient/family education and to maximize patient's level of independence in the home and community environment.     Patient's spiritual, cultural and educational needs considered and patient agreeable to plan of care and goals.     Anticipated barriers to physical therapy: severity of fibrosis and scarring    Goals:   The following goals were discussed with the patient and patient is in agreement with them as to be addressed in the treatment plan.      Short Term Goals: (6 weeks)   Goals: Progressing / MET Date Established Date Assessed   1. Patient will demonstrate decreased localized lymphedema to the jawline by  palpation and visualization of improved symmetry left and right  PROGRESSING 05-  PROGRESSING 06-   2. Patient will demonstrate 100% knowledge of lymphedema precautions and signs of infection to allow for reduced lymphedema risk, infection risk, and/or exacerbation of condition.  PROGRESSING 05-  MET 05-   3. Patient or caregiver will perform self-bandaging techniques and/or wearing of compression garments to allow for lymphatic drainage support, skin elasticity, and reduction in shape and size of limb.  PROGRESSING 05-  MET 05-   4. Patient will perform self lymph drainage techniques to areas within reach to enhance lymphatic drainage and skin condition.  PROGRESSING 05-  MET 05-   5. Patient will tolerate daily activities with external foam chips compression/ bandaging to allow for lymphatic and venous support.  PROGRESSING 05-  MET 05-      Long Term Goals: (12  weeks)   Goals: Progressing / MET Date Established Date Assessed   1. Patient will show decreased total girth measurement in sum of measurements to head and neck by up to 2 cm  to allow for improved comfort. PROGRESSING 05-  MET 07-   2. Patient will show reduction in density to mild/moderate or less with improved contour of limb to  allow for cosmesis, symmetry, infection risk reduction, excursion of sift tissues for swallowing to clear secretions to protect the airway PROGRESSING 05-   PROGRESSING 06-   3. Patient to malinda/doff compression garment with daily compliance to assist in lymphedema management, skin elasticity, and tissue density PROGRESSING 05-  MET 05-   4. Patient to show improved postural awareness and alignment. PROGRESSING 05-  MET 06-   5. Patient to be independent and compliant with home exercise program to allow for increased function in affected limb. PROGRESSING 05-         PLAN     Continue Physical Therapy  2x weekly for 6 weeks Complete Decongestive Therapy:  Manual lymphatic drainage, Multilayered short stretch bandaging, Pneumatic compression, Therapeutic exercises, and Patient education as deemed necessary to achieve stated goals.      Ena Mott, PT CLT   06-

## 2023-07-19 ENCOUNTER — CLINICAL SUPPORT (OUTPATIENT)
Dept: REHABILITATION | Facility: HOSPITAL | Age: 73
End: 2023-07-19
Payer: MEDICARE

## 2023-07-19 DIAGNOSIS — R29.898 DECREASED ROM OF NECK: ICD-10-CM

## 2023-07-19 DIAGNOSIS — L90.5 SCAR CONDITIONS AND FIBROSIS OF SKIN: ICD-10-CM

## 2023-07-19 DIAGNOSIS — I89.0 LYMPHEDEMA DUE TO RADIATION: Primary | ICD-10-CM

## 2023-07-19 PROCEDURE — 97110 THERAPEUTIC EXERCISES: CPT | Mod: PN

## 2023-07-19 PROCEDURE — 97140 MANUAL THERAPY 1/> REGIONS: CPT | Mod: PN

## 2023-07-19 NOTE — PROGRESS NOTES
[facility]   Physical Therapy Treatment Note   Lymphedema - Head and Neck    Name: Cyrus Batres Jr.  Clinic Number: [id number]    Therapy Diagnosis:   Encounter Diagnoses   Name Primary?    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    Scar conditions and fibrosis of skin     Decreased ROM of neck            Physician: Matheus Portillo Jr., MD    Visit Date: 7/19/2023    Physician Orders: PT Eval and Treat   Medical Diagnosis from Referral: Malignant neoplasm of base of tongue  - Primary   Evaluation Date: 5/8/2023  Authorization Period Expiration: 06-  Plan of Care Expiration: 08-  Progress Note Due: v 10  Visit # / Visits authorized: 17/ 30   FOTO: not completed       Time In: 1505   Time Out: 1600  Total Billable Time: 55 minutes    SUBJECTIVE     Nicolás reports: He felt more relief from the muscle tightness in his neck with prolonged passive stretch last visit.  He continues to use taping for scars.    Nicolás reported wearing compression outside of therapy visits: Yes - to anterior neck as instructed at initial evaluation  Nicolás was compliant with home exercise program.    Response to previous treatment: excellent  Functional change: improvement in skin quality and cervical spine range of motion     Pain: 0/10  Location: bilateral anterior neck      OBJECTIVE     Girth Measurements:     Girth Measurements (in centimeters)  LANDMARK Head/Neck Measurement  05/ / 07- Face  Right Face Left    Diagonal Head Circumference 55.0 cm / 55.0 cm       Vertical Submental Circumference         Mandibular Angle to Mandibular Angle 27.5 cm /  25.0 cm       Tragus Angle to Tragus Angle 32.0 cm / 32.1 cm       Neck Superior 37.5 cm /  36.0 cm       Neck Middle 34.0 cm / 33.0 cm       Neck Inferior  34.5 cm / 34.3 cm       Tragus to Mental Protuberance         Tragus to Mouth Angle         Mandibular Angle to Nasal Wing         Mandibular Angle to Internal Eye Corner         Mandibular  Angle to External Eye Corner         Mental Protuberance to Internal Eye Corner         Mandibular Angle to Mental Protuberance         Totals         Total girth measurement-   220.5 cm / 215.4 cm       Total Girth Reduction   (-) 5.1 cm                Treatment     Nicolás received the treatments listed below:      Patient received from waiting room.     Palpation/Inspection:  Improved skin glide throughout, noted softening of the fibrotic tissues under his chin and along the jaw line.    He has increased active range of motion  to his cervical spine, improving his capacity to look over his shoulder and overhead.      manual therapy techniques: Complete Decongestive Therapy was applied to the: head and neck for 45 minutes, including: manual lymphatic drainage and short stretch compression bandaging     Treatment forcus on decreasing fibrosis and scarring to anterior neck in sx and radiation therapy zone     MANUAL LYMPHATIC DRAINAGE:   Short neck  Prom with end range of motion prolonged passive stretch to anterior chest, shoulder depression  Prolonged passive stretch to left and right rosa-lateral neck soft tissues, x 90 sec each   Long neck  Anterior neck routed posterior to the radiation therapy zone and then to terminus  Fibrosis techniques to scar and fibrosis at anterior neck/radiation therapy zone right of center, using low range prolonged passive stretch with fingertips - bulk of treatment  Mobilization,  cartilages in anterior neck and trachea - NOT completed  S deformation and elongation of SCM and anterior strap muscles, left and right   PPS to left and right SCM, shoulder depression with overpressure, 2 x 90 seconds each  Redo long neck  Deep breathing    Therapeutic exercise x 0 mins for upper body and postural muscle strengthening, activation of muscle and joint pumps.  Deferred 2' soreness from this weekend.    UBE clockwise/counterclockwise level 2.5 x 8 mins total  Standing cervical retraction,  ball behind head, 2 x 10  Horizontal and vertical purple theraband scapular wall slides, left and right, x 15 each  Shoulder extension, bilateral with 8# bar  Standing purple theraband external rotation, left and right x 10 each.  Difficulty on the left 2' muscle weakness  Standing scaption, bilateral, to > 90' , 1# x 10  Standing lateral pulls, bilateral, purple x 20      Patient Education and Home Exercises       Home Exercises and Patient Education Provided     Education provided:   - Educated in self massage to abdominal areas, and cervical region  - Patient to leave short-stretch compression bandages intact for 12-24 hours as tolerated, discontinue with any problems, return rolled bandages next session. Wash and wear schedules confirmed.   - Continue HEP of AROM, stretching, and postural correction.   - Educated on self or assisted bandaging, compression options, and risk reduction    06-:   Patient was demonstrated and practices lateral cervical muscle stretch, with one hand anchored under thigh and the other to provide overpressure.  He was instructed to perform with submax tension, and to hold for 90 seconds.    Written Home Exercises Provided: Patient instructed to cont prior HEP. Exercises were reviewed and Nicolás was able to demonstrate them prior to the end of the session.  Nicolás demonstrated good  understanding of the education provided. See EMR under Patient Instructions for exercises provided during therapy sessions    ASSESSMENT     Nicolás remains engaged and motivated and notes that his symptoms are better since initiating skilled PT. He is making steady progress towards goals attainment.    Nicolás Is progressing well towards his goals.   Pt prognosis is Good.     Patient will continue to benefit from skilled outpatient physical therapy to address the deficits listed in the problem list box on initial evaluation, provide patient/family education and to maximize patient's level of  independence in the home and community environment.     Patient's spiritual, cultural and educational needs considered and patient agreeable to plan of care and goals.     Anticipated barriers to physical therapy: severity of fibrosis and scarring    Goals:   The following goals were discussed with the patient and patient is in agreement with them as to be addressed in the treatment plan.      Short Term Goals: (6 weeks)   Goals: Progressing / MET Date Established Date Assessed   1. Patient will demonstrate decreased localized lymphedema to the jawline by  palpation and visualization of improved symmetry left and right  PROGRESSING 05-  PROGRESSING 06-   2. Patient will demonstrate 100% knowledge of lymphedema precautions and signs of infection to allow for reduced lymphedema risk, infection risk, and/or exacerbation of condition.  PROGRESSING 05-  MET 05-   3. Patient or caregiver will perform self-bandaging techniques and/or wearing of compression garments to allow for lymphatic drainage support, skin elasticity, and reduction in shape and size of limb.  PROGRESSING 05-  MET 05-   4. Patient will perform self lymph drainage techniques to areas within reach to enhance lymphatic drainage and skin condition.  PROGRESSING 05-  MET 05-   5. Patient will tolerate daily activities with external foam chips compression/ bandaging to allow for lymphatic and venous support.  PROGRESSING 05-  MET 05-      Long Term Goals: (12  weeks)   Goals: Progressing / MET Date Established Date Assessed   1. Patient will show decreased total girth measurement in sum of measurements to head and neck by up to 2 cm  to allow for improved comfort. PROGRESSING 05-  MET 07-   2. Patient will show reduction in density to mild/moderate or less with improved contour of limb to allow for cosmesis, symmetry, infection risk reduction, excursion of sift tissues for  swallowing to clear secretions to protect the airway PROGRESSING 05-   PROGRESSING 06-   3. Patient to malinda/doff compression garment with daily compliance to assist in lymphedema management, skin elasticity, and tissue density PROGRESSING 05-  MET 05-   4. Patient to show improved postural awareness and alignment. PROGRESSING 05-  MET 06-   5. Patient to be independent and compliant with home exercise program to allow for increased function in affected limb. PROGRESSING 05-         PLAN     Continue Physical Therapy  2x weekly for 6 weeks Complete Decongestive Therapy:  Manual lymphatic drainage, Multilayered short stretch bandaging, Pneumatic compression, Therapeutic exercises, and Patient education as deemed necessary to achieve stated goals.      Ena Mott, PT CLT   06-

## 2023-07-24 ENCOUNTER — CLINICAL SUPPORT (OUTPATIENT)
Dept: REHABILITATION | Facility: HOSPITAL | Age: 73
End: 2023-07-24
Payer: MEDICARE

## 2023-07-24 DIAGNOSIS — L90.5 SCAR CONDITIONS AND FIBROSIS OF SKIN: ICD-10-CM

## 2023-07-24 DIAGNOSIS — I89.0 LYMPHEDEMA DUE TO RADIATION: Primary | ICD-10-CM

## 2023-07-24 DIAGNOSIS — R29.898 DECREASED ROM OF NECK: ICD-10-CM

## 2023-07-24 PROCEDURE — 97140 MANUAL THERAPY 1/> REGIONS: CPT | Mod: PN

## 2023-07-24 PROCEDURE — 97110 THERAPEUTIC EXERCISES: CPT | Mod: PN

## 2023-07-24 NOTE — PROGRESS NOTES
[facility]   Physical Therapy Treatment Note   Lymphedema - Head and Neck    Name: Cyrus Batres Jr.  Clinic Number: [id number]    Therapy Diagnosis:   Encounter Diagnoses   Name Primary?    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    Scar conditions and fibrosis of skin     Decreased ROM of neck      Physician: Matheus Portillo Jr., MD    Visit Date: 7/24/2023    Physician Orders: PT Eval and Treat   Medical Diagnosis from Referral: Malignant neoplasm of base of tongue  - Primary   Evaluation Date: 5/8/2023  Authorization Period Expiration: 06-  Plan of Care Expiration: 08-  Progress Note Due: v 10  Visit # / Visits authorized: 18/ 30   FOTO: not completed       Time In: 1505   Time Out: 1607  Total Billable Time: 60 minutes    SUBJECTIVE     Nicolás reports: He reports that he is tight and full to the submandibular area, right worse than left .   He is having cataract surgery, his next PT appointment is on Aug 7.    Nicolás reported wearing compression outside of therapy visits: Yes - to anterior neck as instructed at initial evaluation  Nicolás was compliant with home exercise program.    Response to previous treatment: excellent  Functional change: improvement in skin quality and cervical spine range of motion     Pain: 0/10  Location: bilateral anterior neck      OBJECTIVE     Girth Measurements:     Girth Measurements (in centimeters)  LANDMARK Head/Neck Measurement  05/ / 07- Face  Right Face Left    Diagonal Head Circumference 55.0 cm / 55.0 cm       Vertical Submental Circumference         Mandibular Angle to Mandibular Angle 27.5 cm /  25.0 cm       Tragus Angle to Tragus Angle 32.0 cm / 32.1 cm       Neck Superior 37.5 cm /  36.0 cm       Neck Middle 34.0 cm / 33.0 cm       Neck Inferior  34.5 cm / 34.3 cm       Tragus to Mental Protuberance         Tragus to Mouth Angle         Mandibular Angle to Nasal Wing         Mandibular Angle to Internal Eye Corner          Mandibular Angle to External Eye Corner         Mental Protuberance to Internal Eye Corner         Mandibular Angle to Mental Protuberance         Totals         Total girth measurement-   220.5 cm / 215.4 cm       Total Girth Reduction   (-) 5.1 cm                Treatment     Nicolás received the treatments listed below:      Patient received from waiting room.     Palpation/Inspection:  Improved skin glide throughout, noted softening of the fibrotic tissues under his chin and along the jaw line.    He has increased active range of motion  to his cervical spine, improving his capacity to look over his shoulder and overhead.      manual therapy techniques: Complete Decongestive Therapy was applied to the: head and neck for 45 minutes, including: manual lymphatic drainage and short stretch compression bandaging     Treatment forcus on decreasing fibrosis and scarring to anterior neck in sx and radiation therapy zone     MANUAL LYMPHATIC DRAINAGE:   Short neck  Prom with end range of motion prolonged passive stretch to anterior chest, shoulder depression  Prolonged passive stretch to left and right rosa-lateral neck soft tissues, x 90 sec each   Long neck  Anterior neck routed posterior to the radiation therapy zone and then to terminus  Fibrosis techniques to scar and fibrosis at anterior neck/radiation therapy zone right of center, using low range prolonged passive stretch with fingertips - bulk of treatment  Mobilization,  cartilages in anterior neck and trachea - NOT completed  S deformation and elongation of SCM and anterior strap muscles, left and right  - NOT completed this date   Soft, small cup use for negative pressure to submandibular area for enhanced fibrosis management  PPS to left and right SCM, shoulder depression with overpressure, 2 x 90 seconds each  Redo long neck  Deep breathing    Therapeutic exercise x 10 mins for upper body and postural muscle strengthening, activation of muscle and joint  pumps.  Deferred 2' soreness from this weekend.    UBE clockwise/counterclockwise level 3.0 x 8 mins total  Doorway stretches, hands high, low, 2 x 30 seconds each  // pushups x 10  Standing scaption, bilateral, to > 90' , 1# x 15    NOT completed this date   Standing cervical retraction, ball behind head, 2 x 10  Horizontal and vertical purple theraband scapular wall slides, left and right, x 15 each  Shoulder extension, bilateral with 8# bar  Standing purple theraband external rotation, left and right x 10 each.  Difficulty on the left 2' muscle weakness  Standing lateral pulls, bilateral, purple x 20      Patient Education and Home Exercises       Home Exercises and Patient Education Provided     Education provided:   - Educated in self massage to abdominal areas, and cervical region  - Patient to leave short-stretch compression bandages intact for 12-24 hours as tolerated, discontinue with any problems, return rolled bandages next session. Wash and wear schedules confirmed.   - Continue HEP of AROM, stretching, and postural correction.   - Educated on self or assisted bandaging, compression options, and risk reduction    06-:   Patient was demonstrated and practices lateral cervical muscle stretch, with one hand anchored under thigh and the other to provide overpressure.  He was instructed to perform with submax tension, and to hold for 90 seconds.    Written Home Exercises Provided: Patient instructed to cont prior HEP. Exercises were reviewed and Nicolás was able to demonstrate them prior to the end of the session.  Nicolás demonstrated good  understanding of the education provided. See EMR under Patient Instructions for exercises provided during therapy sessions    ASSESSMENT     Nicolás had (+) symptoms exacerbation over the weekend, which responded well to manual therapy interventions this visit.  He has tape and home exercise program to work on while he is out for eye surgery.  He continues to respond  to and progress with skilled PT.    Nicolás Is progressing well towards his goals.   Pt prognosis is Good.     Patient will continue to benefit from skilled outpatient physical therapy to address the deficits listed in the problem list box on initial evaluation, provide patient/family education and to maximize patient's level of independence in the home and community environment.     Patient's spiritual, cultural and educational needs considered and patient agreeable to plan of care and goals.     Anticipated barriers to physical therapy: severity of fibrosis and scarring    Goals:   The following goals were discussed with the patient and patient is in agreement with them as to be addressed in the treatment plan.      Short Term Goals: (6 weeks)   Goals: Progressing / MET Date Established Date Assessed   1. Patient will demonstrate decreased localized lymphedema to the jawline by  palpation and visualization of improved symmetry left and right  PROGRESSING 05-  PROGRESSING 06-   2. Patient will demonstrate 100% knowledge of lymphedema precautions and signs of infection to allow for reduced lymphedema risk, infection risk, and/or exacerbation of condition.  PROGRESSING 05-  MET 05-   3. Patient or caregiver will perform self-bandaging techniques and/or wearing of compression garments to allow for lymphatic drainage support, skin elasticity, and reduction in shape and size of limb.  PROGRESSING 05-  MET 05-   4. Patient will perform self lymph drainage techniques to areas within reach to enhance lymphatic drainage and skin condition.  PROGRESSING 05-  MET 05-   5. Patient will tolerate daily activities with external foam chips compression/ bandaging to allow for lymphatic and venous support.  PROGRESSING 05-  MET 05-      Long Term Goals: (12  weeks)   Goals: Progressing / MET Date Established Date Assessed   1. Patient will show decreased total  girth measurement in sum of measurements to head and neck by up to 2 cm  to allow for improved comfort. PROGRESSING 05-  MET 07-   2. Patient will show reduction in density to mild/moderate or less with improved contour of limb to allow for cosmesis, symmetry, infection risk reduction, excursion of sift tissues for swallowing to clear secretions to protect the airway PROGRESSING 05-   PROGRESSING 06-   3. Patient to malinda/doff compression garment with daily compliance to assist in lymphedema management, skin elasticity, and tissue density PROGRESSING 05-  MET 05-   4. Patient to show improved postural awareness and alignment. PROGRESSING 05-  MET 06-   5. Patient to be independent and compliant with home exercise program to allow for increased function in affected limb. PROGRESSING 05-         PLAN     Continue Physical Therapy  2x weekly for 6 weeks Complete Decongestive Therapy:  Manual lymphatic drainage, Multilayered short stretch bandaging, Pneumatic compression, Therapeutic exercises, and Patient education as deemed necessary to achieve stated goals.      Ena Mott, PT CLT   06-

## 2023-07-29 NOTE — PROGRESS NOTES
Subjective:       Patient ID: Cyrus Batres Jr. is a 73 y.o. male.    Chief Complaint: Pre-op Exam (Needs clearance for cataract surgery for the right eye.), Throat cancer/head and neck cancer, Aphonia, Thyroid Problem, Hypertension, and Parenteral feeding    Patient is a 73-year-old male who comes for follow-up.  I am seeing him now after approximately 7-8 months.    Since patient is aphonic, the communication is through my conversation and he usually gives brief answers by writing with his electronic stylus on electronic pad board.  He carries the electronic scribble pad with him for communication with anyone that he may come across.  The answers are appropriate but sometimes do not fully complete the nuances or details.    He is here for preoperative clearance for proposed cataract surgery.  This will be done by Dr. Stoney Munoz MD ophthalmology under monitored anesthesia care.  The surgery will be done for right eye.    He has multiple medical issues including diagnosis of tongue cancer for which initially had received radiation therapy but recently underwent salvage glossectomy of radical nature.  Flap replacement.  By Dr. James    His chronic medical conditions include the following:-  1.-APHONIC SECONDARY TO TRACHEOSTOMY STATUS AND COMMUNICATES THROUGH ELECTRONIC CHALK BOARD.  2. FEEDING VIA PEG TUBE WHICH IS STABLE AT THIS POINT.  Gets Jevity feeding  3.-INSOMNIA WITH CURRENT STABLE DEPENDENCY ON AMBIEN 5 MG.  Also trazodone noted in his list.  4.-HISTORY OF DIARRHEA FOR WHICH HE TAKES OPIUM TINCTURE.  5.-RECENT LABS HAVE SHOWN A SOMEWHAT LOW SODIUM.  CAUSE OF LOW SODIUM UNCERTAIN AND NOT SURE IF HE IS GETTING ENOUGH ELECTROLYTES.  6.-diabetes mellitus at least in past but I suspect secondary to weight loss sugar numbers have got better.  Currently he is not noted to be on any medications.  7.-GI protection with esomeprazole and famotidine.  8.-hypocalcemia currently on calcium carbonate supplementation.   Possibly secondary to weight loss and nutritional issues.      NEED TO CHECK WITH NUTRITION ALSO CONCERNING HIS JEVITY FEEDING AND IF SODIUM CAN BE INCLUDED.    Thyroid Problem  Presents for follow-up visit. Symptoms include fatigue and weight loss (expected after cancer treatment). Patient reports no anxiety, cold intolerance, constipation, diarrhea, hoarse voice, palpitations or weight gain. The symptoms have been stable.   Hypertension  This is a chronic problem. The current episode started more than 1 year ago. The problem has been gradually improving since onset. The problem is controlled. Pertinent negatives include no chest pain, headaches, neck pain or palpitations. Risk factors for coronary artery disease include sedentary lifestyle and male gender. Past treatments include angiotensin blockers. The current treatment provides significant improvement. Compliance problems include psychosocial issues.  Identifiable causes of hypertension include a thyroid problem.       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     Employer: [employer]   Tobacco Use    Smoking status: Never    Smokeless tobacco: Never   Substance and Sexual  Activity    Alcohol use: Not Currently     Comment: occasional    Drug use: No    Sexual activity: Yes     Partners: Female   Social History Narrative    ** Merged History Encounter **         2 children from his 1st wife     Social Determinants of Health     Financial Resource Strain: Low Risk  (1/26/2023)    Overall Financial Resource Strain (CARDIA)     Difficulty of Paying Living Expenses: Not hard at all   Food Insecurity: No Food Insecurity (1/26/2023)    Hunger Vital Sign     Worried About Running Out of Food in the Last Year: Never true     Ran Out of Food in the Last Year: Never true   Transportation Needs: No Transportation Needs (1/26/2023)    PRAPARE - Transportation     Lack of Transportation (Medical): No     Lack of Transportation (Non-Medical): No   Physical Activity: Insufficiently Active (1/26/2023)    Exercise Vital Sign     Days of Exercise per Week: 1 day     Minutes of Exercise per Session: 30 min   Stress: Stress Concern Present (11/23/2022)    [employer] [facility] [employer] - Occupational Stress Questionnaire     Feeling of Stress : To some extent   Social Connections: Socially Isolated (1/26/2023)    Social Connection and Isolation Panel [NHANES]     Frequency of Communication with Friends and Family: Once a week     Frequency of Social Gatherings with Friends and Family: Once a week     Attends Hoahaoism Services: Never     Active Member of Clubs or Organizations: No     Attends Club or Organization Meetings: Never     Marital Status:    Housing Stability: Low Risk  (1/26/2023)    Housing Stability Vital Sign     Unable to Pay for Housing in the Last Year: No     Number of Places Lived in the Last Year: 1     Unstable Housing in the Last Year: No     Past Surgical History:   Procedure Laterality Date    DIRECT LARYNGOBRONCHOSCOPY N/A 12/27/2021    Procedure: LARYNGOSCOPY, DIRECT, WITH BRONCHOSCOPY;  Surgeon: Flex Esipnosa MD;  Location: [facility] OR [address] [place] [address];  Service: ENT;   Laterality: N/A;    DIRECT LARYNGOSCOPY  11/9/2022    Procedure: LARYNGOSCOPY, DIRECT;  Surgeon: Jesse James MD;  [facility];  Service: ENT;;    DISSECTION OF NECK Bilateral 1/6/2022    Procedure: DISSECTION, NECK;  Surgeon: Jesse James MD;  [facility];  Service: ENT;  Laterality: Bilateral;    DISSECTION OF NECK Bilateral 11/9/2022    Procedure: DISSECTION, NECK;  Surgeon: Jesse James MD;  Location: [facility] OR [facility];  Service: ENT;  Laterality: Bilateral;    FLAP PROCEDURE Right 1/6/2022    Procedure: CREATION, FREE FLAP;  Surgeon: Alise Hart MD;  [facility];  Service: ENT;  Laterality: Right;  Ischemic start 1351  Ischemic stop 1502    FLAP PROCEDURE Left 11/9/2022    Procedure: CREATION, FREE FLAP, ALT;  Surgeon: Alise Hart MD;  [facility];  Service: ENT;  Laterality: Left;    GLOSSECTOMY Bilateral 11/9/2022    Procedure: TOTAL GLOSSECTOMY;  Surgeon: Jesse James MD;  [facility];  Service: ENT;  Laterality: Bilateral;    INSERTION OF TUNNELED CENTRAL VENOUS CATHETER (CVC) WITH SUBCUTANEOUS PORT N/A 6/9/2022    Procedure: [id number];  Surgeon: Jesus Viera MD;  Location: [facility];  Service: General;  Laterality: N/A;    INSERTION OF TUNNELED CENTRAL VENOUS CATHETER (CVC) WITH SUBCUTANEOUS PORT Right 1/12/2023    Procedure: [id number];  Surgeon: Abdulaziz Le Jr., MD;  Location: [facility] OR;  Service: General;  Laterality: Right;    LARYNGECTOMY N/A 1/6/2022    Procedure: LARYNGECTOMY;  Surgeon: Jesse James MD;  [facility];  Service: ENT;  Laterality: N/A;    LARYNGOSCOPY N/A 8/4/2020    Procedure: Suspension microlaryngoscopy with biopsy, possible KTP laser treatment/excision;  Surgeon: Stew Noel MD;  [facility];  Service: ENT;  Laterality: N/A;  Microscope, telescopes, tower, microinstruments, FLORINP laser, rep conf# [id number]  IC 7/28.    LARYNGOSCOPY N/A 3/16/2021    Procedure: Suspension microlaryngoscopy with excision of lesion, possible CO2 laser;  Surgeon: Stew Noel MD;  [facility];  Service: ENT;  Laterality: N/A;  Microscope, telescopes, tower, microinstruments, CO2 laser, rep conf# [id number] IC 3/4.    LARYNGOSCOPY N/A 4/1/2021    Procedure: Suspension microlaryngoscopy with KTP laser excision of lesion;  Surgeon: Stew Noel MD;  [facility];  Service: ENT;  Laterality: N/A;  Microscope, telescopes, tower, microinstruments, 70 degree scope, vocal fold , KTP laser, rep conf# [id number] BC    LARYNGOSCOPY N/A 12/9/2021    Procedure: Suspension microlaryngoscopy with biopsy;  Surgeon: Stew Noel MD;  [facility];  Service: ENT;  Laterality: N/A;  Microscope, telescopes, tower, microinstruments    LARYNGOSCOPY N/A 1/6/2022    Procedure: LARYNGOSCOPY;  Surgeon: Jesse James MD;  [facility];  Service: ENT;  Laterality: N/A;    LARYNGOSCOPY N/A 4/27/2022    Procedure: LARYNGOSCOPY WITH BIOPSY;  Surgeon: Jesse James MD;  [facility];  Service: ENT;  Laterality: N/A;    MICROLARYNGOSCOPY N/A 3/17/2020    Procedure: MICROLARYNGOSCOPY;  Surgeon: Jung Xiao MD;  Location: [facility];  Service: ENT;  Laterality: N/A;  Laser Microlaryngoscopy  NEED TO SCHEDULE LASER from [facility] [id number]    PHARYNGECTOMY  11/9/2022    Procedure: TOTAL PHARYNGECTOMY;  Surgeon: Jesse James MD;  [facility];  Service: ENT;;    REIMPLANTATION OF PARATHYROID TISSUE N/A 1/6/2022    Procedure: REIMPLANTATION, PARATHYROID TISSUE;  Surgeon: Jesse James MD;  [facility];  Service: ENT;  Laterality: N/A;    SKIN SPLIT GRAFT Right 11/9/2022    Procedure: APPLICATION, GRAFT, SKIN, SPLIT-THICKNESS;  Surgeon: Jesse James MD;  Location: [facility] OR [address] [place] [address];  Service: ENT;  Laterality: Right;     THYROIDECTOMY  1/6/2022    Procedure: THYROIDECTOMY;  Surgeon: Jesse James MD;  Location: [facility] OR [address] [place] [address];  Service: ENT;;    TRACHEOSTOMY N/A 12/27/2021    Procedure: CREATION, TRACHEOSTOMY;  Surgeon: Flex Espinosa MD;  Location: [facility] OR [address] [place] [address];  Service: ENT;  Laterality: N/A;     Family History   Problem Relation Age of Onset    Abnormal EKG Mother     Diabetes Father     Heart disease Father     Hypertension Father        Review of Systems   Constitutional:  Positive for fatigue and weight loss (expected after cancer treatment). Negative for activity change, chills, unexpected weight change and weight gain.   HENT:  Negative for congestion, hearing loss, hoarse voice, rhinorrhea and trouble swallowing.         Underlying history of head and neck cancer status post laryngectomy, glossectomy has been noted.  Radical surgeries and he is aphonic and has a permanent tracheostomy tube.   Eyes:  Positive for visual disturbance. Negative for discharge and redness.        Cataracts and now seeks surgery for the same.   Respiratory:  Positive for cough. Negative for chest tightness and wheezing.    Cardiovascular:  Negative for chest pain and palpitations.   Gastrointestinal:  Negative for abdominal pain, blood in stool, constipation, diarrhea and vomiting.   Endocrine: Negative for cold intolerance, polydipsia and polyuria.        Used to have diabetes mellitus but I suspect secondary to weight loss this has dissipated.  Hypothyroidism currently on levothyroxine 100 mcg.   Genitourinary:  Negative for difficulty urinating, hematuria and urgency.   Musculoskeletal:  Negative for arthralgias, joint swelling and neck pain.   Skin:  Negative for color change, pallor and wound.   Neurological:  Negative for seizures, weakness and headaches.   Psychiatric/Behavioral:  Negative for confusion and dysphoric mood. The patient is not nervous/anxious.          Objective:      Blood pressure 122/69, pulse 78, temperature  "98.3 °F (36.8 °C), resp. rate 18, height 5' 6" (1.676 m), weight 60.8 kg (134 lb 1.6 oz), SpO2 99 %. Body mass index is 21.64 kg/m².  Physical Exam  Constitutional:       General: He is not in acute distress.     Appearance: He is ill-appearing. He is not diaphoretic.      Comments: BMI is 21.64-somewhat chronically ill and emaciated   HENT:      Head: Normocephalic.        Comments: Tracheostomy tube noted in the neck.  Fitting well.  No infection.  21.64     Mouth/Throat:        Comments: EXAMINATION OF ORAL CAVITY DIFFICULT BECAUSE OF TIGHT MASSETERS.  TOTAL GLOSSECTOMY STATE.  Eyes:      General: No scleral icterus.  Neck:      Vascular: No carotid bruit.   Cardiovascular:      Rate and Rhythm: Normal rate and regular rhythm.   Pulmonary:      Effort: Pulmonary effort is normal. No respiratory distress.      Breath sounds: Normal breath sounds. No wheezing or rhonchi.   Abdominal:      General: There is no distension.      Palpations: Abdomen is soft. There is no mass.      Tenderness: There is no abdominal tenderness.          Comments: Peg tube noted.   Musculoskeletal:         General: No swelling or tenderness.      Cervical back: No rigidity or tenderness.      Right lower leg: No edema.      Left lower leg: No edema.   Skin:     Coloration: Skin is not jaundiced or pale.      Findings: No erythema or lesion.   Neurological:      Mental Status: He is alert. Mental status is at baseline.   Psychiatric:         Behavior: Behavior normal.           Assessment:               Preoperative clearance    Essential hypertension    Elevated blood sugar    History of total thyroidectomy    Hyponatremia  -     sodium chloride 1,000 mg TbSO oral tablet; Take 1 tablet (1,000 mg total) by mouth 2 (two) times a day. Please crush the tablets for the PEG tube feeding or see if a capsule form is available.  Dispense: 100 tablet; Refill: 2  -     Basic Metabolic Panel; Future; Expected date: 08/10/2023    Normocytic " normochromic anemia    Tracheostomy tube present    Presence of externally removable percutaneous endoscopic gastrostomy (PEG) tube    Chest congestion       Lab Visit on 07/14/2023   Component Date Value Ref Range Status    WBC 07/14/2023 5.03  3.90 - 12.70 K/uL Final    RBC 07/14/2023 3.60 (L)  4.60 - 6.20 M/uL Final    Hemoglobin 07/14/2023 11.1 (L)  14.0 - 18.0 g/dL Final    Hematocrit 07/14/2023 34.0 (L)  40.0 - 54.0 % Final    MCV 07/14/2023 94  82 - 98 fL Final    MCH 07/14/2023 30.8  27.0 - 31.0 pg Final    MCHC 07/14/2023 32.6  32.0 - 36.0 g/dL Final    RDW 07/14/2023 12.6  11.5 - 14.5 % Final    Platelets 07/14/2023 150  150 - 450 K/uL Final    MPV 07/14/2023 10.6  9.2 - 12.9 fL Final    Immature Granulocytes 07/14/2023 0.8 (H)  0.0 - 0.5 % Final    Gran # (ANC) 07/14/2023 3.9  1.8 - 7.7 K/uL Final    Immature Grans (Abs) 07/14/2023 0.04  0.00 - 0.04 K/uL Final    Lymph # 07/14/2023 0.6 (L)  1.0 - 4.8 K/uL Final    Mono # 07/14/2023 0.3  0.3 - 1.0 K/uL Final    Eos # 07/14/2023 0.2  0.0 - 0.5 K/uL Final    Baso # 07/14/2023 0.01  0.00 - 0.20 K/uL Final    nRBC 07/14/2023 0  0 /100 WBC Final    Gran % 07/14/2023 76.9 (H)  38.0 - 73.0 % Final    Lymph % 07/14/2023 12.7 (L)  18.0 - 48.0 % Final    Mono % 07/14/2023 5.8  4.0 - 15.0 % Final    Eosinophil % 07/14/2023 3.6  0.0 - 8.0 % Final    Basophil % 07/14/2023 0.2  0.0 - 1.9 % Final    Differential Method 07/14/2023 Automated   Final    Sodium 07/14/2023 133 (L)  136 - 145 mmol/L Final    Potassium 07/14/2023 3.6  3.5 - 5.1 mmol/L Final    Chloride 07/14/2023 97  95 - 110 mmol/L Final    CO2 07/14/2023 27  23 - 29 mmol/L Final    Glucose 07/14/2023 158 (H)  70 - 110 mg/dL Final    BUN 07/14/2023 30 (H)  8 - 23 mg/dL Final    Creatinine 07/14/2023 1.1  0.5 - 1.4 mg/dL Final    Calcium 07/14/2023 7.7 (L)  8.7 - 10.5 mg/dL Final    Total Protein 07/14/2023 7.0  6.0 - 8.4 g/dL Final    Albumin 07/14/2023 4.0  3.5 - 5.2 g/dL Final    Total Bilirubin 07/14/2023  1.1 (H)  0.1 - 1.0 mg/dL Final    Alkaline Phosphatase 07/14/2023 201 (H)  55 - 135 U/L Final    AST 07/14/2023 37  10 - 40 U/L Final    ALT 07/14/2023 38  10 - 44 U/L Final    eGFR 07/14/2023 >60.0  >60 mL/min/1.73 m^2 Final    Anion Gap 07/14/2023 9  8 - 16 mmol/L Final   Hospital Outpatient Visit on 05/30/2023   Component Date Value Ref Range Status    POC Glucose 05/30/2023 113 (H)  70 - 110 Final   Lab Visit on 05/26/2023   Component Date Value Ref Range Status    WBC 05/26/2023 3.80 (L)  3.90 - 12.70 K/uL Final    RBC 05/26/2023 3.34 (L)  4.60 - 6.20 M/uL Final    Hemoglobin 05/26/2023 10.9 (L)  14.0 - 18.0 g/dL Final    Hematocrit 05/26/2023 32.5 (L)  40.0 - 54.0 % Final    MCV 05/26/2023 97  82 - 98 fL Final    MCH 05/26/2023 32.6 (H)  27.0 - 31.0 pg Final    MCHC 05/26/2023 33.5  32.0 - 36.0 g/dL Final    RDW 05/26/2023 12.1  11.5 - 14.5 % Final    Platelets 05/26/2023 158  150 - 450 K/uL Final    MPV 05/26/2023 10.7  9.2 - 12.9 fL Final    Immature Granulocytes 05/26/2023 0.5  0.0 - 0.5 % Final    Gran # (ANC) 05/26/2023 2.6  1.8 - 7.7 K/uL Final    Immature Grans (Abs) 05/26/2023 0.02  0.00 - 0.04 K/uL Final    Lymph # 05/26/2023 0.7 (L)  1.0 - 4.8 K/uL Final    Mono # 05/26/2023 0.3  0.3 - 1.0 K/uL Final    Eos # 05/26/2023 0.2  0.0 - 0.5 K/uL Final    Baso # 05/26/2023 0.01  0.00 - 0.20 K/uL Final    nRBC 05/26/2023 0  0 /100 WBC Final    Gran % 05/26/2023 68.3  38.0 - 73.0 % Final    Lymph % 05/26/2023 18.2  18.0 - 48.0 % Final    Mono % 05/26/2023 8.2  4.0 - 15.0 % Final    Eosinophil % 05/26/2023 4.5  0.0 - 8.0 % Final    Basophil % 05/26/2023 0.3  0.0 - 1.9 % Final    Differential Method 05/26/2023 Automated   Final    Sodium 05/26/2023 140  136 - 145 mmol/L Final    Potassium 05/26/2023 3.9  3.5 - 5.1 mmol/L Final    Chloride 05/26/2023 104  95 - 110 mmol/L Final    CO2 05/26/2023 28  23 - 29 mmol/L Final    Glucose 05/26/2023 113 (H)  70 - 110 mg/dL Final    BUN 05/26/2023 22  8 - 23 mg/dL Final     Creatinine 05/26/2023 1.0  0.5 - 1.4 mg/dL Final    Calcium 05/26/2023 8.8  8.7 - 10.5 mg/dL Final    Total Protein 05/26/2023 7.4  6.0 - 8.4 g/dL Final    Albumin 05/26/2023 4.1  3.5 - 5.2 g/dL Final    Total Bilirubin 05/26/2023 0.9  0.1 - 1.0 mg/dL Final    Alkaline Phosphatase 05/26/2023 253 (H)  55 - 135 U/L Final    AST 05/26/2023 35  10 - 40 U/L Final    ALT 05/26/2023 43  10 - 44 U/L Final    Anion Gap 05/26/2023 8  8 - 16 mmol/L Final    eGFR 05/26/2023 >60.0  >60 mL/min/1.73 m^2 Final   Lab Visit on 05/17/2023   Component Date Value Ref Range Status    Microalbumin, Urine 05/17/2023 18.0  ug/mL Final    Creatinine, Urine 05/17/2023 65.0  23.0 - 375.0 mg/dL Final    Microalb/Creat Ratio 05/17/2023 27.7  0.0 - 30.0 ug/mg Final   Lab Visit on 05/17/2023   Component Date Value Ref Range Status    Cholesterol 05/17/2023 162  120 - 199 mg/dL Final    Triglycerides 05/17/2023 102  30 - 150 mg/dL Final    HDL 05/17/2023 56  40 - 75 mg/dL Final    LDL Cholesterol 05/17/2023 85.6  63.0 - 159.0 mg/dL Final    HDL/Cholesterol Ratio 05/17/2023 34.6  20.0 - 50.0 % Final    Total Cholesterol/HDL Ratio 05/17/2023 2.9  2.0 - 5.0 Final    Non-HDL Cholesterol 05/17/2023 106  mg/dL Final    Hemoglobin A1C 05/17/2023 5.1  4.0 - 5.6 % Final    Estimated Avg Glucose 05/17/2023 100  68 - 131 mg/dL Final    Sodium 05/17/2023 143  136 - 145 mmol/L Final    Potassium 05/17/2023 4.0  3.5 - 5.1 mmol/L Final    Chloride 05/17/2023 108  95 - 110 mmol/L Final    CO2 05/17/2023 27  23 - 29 mmol/L Final    Glucose 05/17/2023 100  70 - 110 mg/dL Final    BUN 05/17/2023 21  8 - 23 mg/dL Final    Creatinine 05/17/2023 0.9  0.5 - 1.4 mg/dL Final    Calcium 05/17/2023 8.6 (L)  8.7 - 10.5 mg/dL Final    Total Protein 05/17/2023 7.1  6.0 - 8.4 g/dL Final    Albumin 05/17/2023 4.0  3.5 - 5.2 g/dL Final    Total Bilirubin 05/17/2023 0.9  0.1 - 1.0 mg/dL Final    Alkaline Phosphatase 05/17/2023 279 (H)  55 - 135 U/L Final    AST 05/17/2023 31  10  - 40 U/L Final    ALT 05/17/2023 34  10 - 44 U/L Final    Anion Gap 05/17/2023 8  8 - 16 mmol/L Final    eGFR 05/17/2023 >60.0  >60 mL/min/1.73 m^2 Final    Free T4 05/17/2023 1.04  0.71 - 1.51 ng/dL Final    TSH 05/17/2023 0.442  0.400 - 4.000 uIU/mL Final    Vit D, 25-Hydroxy 05/17/2023 40  30 - 96 ng/mL Final    PTH, Intact 05/17/2023 14.3  9.0 - 77.0 pg/mL Final    Ionized Calcium 05/17/2023 1.12  1.06 - 1.42 mmol/L Final         Plan:           Preoperative clearance    Essential hypertension    Elevated blood sugar    History of total thyroidectomy    Hyponatremia  -     sodium chloride 1,000 mg TbSO oral tablet; Take 1 tablet (1,000 mg total) by mouth 2 (two) times a day. Please crush the tablets for the PEG tube feeding or see if a capsule form is available.  Dispense: 100 tablet; Refill: 2  -     Basic Metabolic Panel; Future; Expected date: 08/10/2023    Normocytic normochromic anemia    Tracheostomy tube present    Presence of externally removable percutaneous endoscopic gastrostomy (PEG) tube    Chest congestion        PATIENT'S MEDICAL CONDITIONS AND HIS ADVANCING CANCER HAS BEEN NOTED.    His conditions and the amount of surgeries that he is gone through our overwhelming.  Still maintains zest for life and any other surgeries that may be needed to make a perfect.    Medically I do not think much improvement is possible as far as his preparation for potential cataract surgery is concerned.    I do not anticipate any major complication form cataract surgery with no risk of blood loss or hemodynamic shifts.  He does have some cough intermittently from probably microaspiration and hopefully this can be mitigated prior to surgery with appropriate amount of suction and if needed some Robinul.    He is fed through PEG tube with Jevity.      He does not have any major cardiac or pulmonary issue.      Renal functions are okay.      His sodium level is low and I am concerned about his nutrition status and  hydration status.  He has lost weight.    The simplest thing I can recommend him to add 1 salt capsule twice a day to his feeding.      I will check with Oncology team about parenteral nutrition and if patient can add a little more salt in his diet and get his sodium levels to up to par.  I am going to check with the oncology colleagues also concerning nutrition issues.  I did check for sodium chloride capsules or salt capsule but he may use his own salt at home.  Tablets may be difficult to crush but may be considered.  At least he will get a sed dosage.  Alternatively he can take 1/3 of a tsp of salt and add to his diet regimen.    He can take his blood pressure medication, thyroid prescription on the morning of surgery.  Continue with Nexium as before.    He does get periodically congested and coughs.  I am not sure if this may hamper the surgery.  A p.r.n. suction might be arranged at that point.    His follow-up with me is usually sparse and interim as I continue to follow him peripherally.  Next follow-up in 6 months or earlier based upon how situation unfolds    Spent brittany 30 minutes with patient which involved review of pts medical conditions, labs, medications and with 50% of time face-to-face discussion about medical problems, management and any applicable changes.      Current Outpatient Medications:     acetaminophen (TYLENOL) 325 MG tablet, Take 325 mg by mouth every 6 (six) hours as needed for Pain., Disp: , Rfl:     calcium carbonate 500 mg/5 mL (1,250 mg/5 mL), Take 20 mls four times daily with meals and nightly., Disp: 2300 mL, Rfl: 12    esomeprazole (NEXIUM) 40 MG capsule, 40 mg before breakfast., Disp: , Rfl:     famotidine (PEPCID) 40 mg/5 mL (8 mg/mL) suspension, Give 2.5 mLs (20 mg total) by Per G Tube route 2 (two) times daily., Disp: 50 mL, Rfl: 11    ibuprofen (ADVIL,MOTRIN) 200 MG tablet, Take 200 mg by mouth every 6 (six) hours as needed for Pain., Disp: , Rfl:     lactose-reduced food  with fibr (JEVITY 1.5 TRISHA) 0.06 gram-1.5 kcal/mL Liqd, 6 cartons per day via peg.  Flush 60ml before and after each bolus, Disp: 180 each, Rfl: 11    levothyroxine (SYNTHROID) 100 MCG tablet, 1 tablet (100 mcg total) by Per G Tube route before breakfast., Disp: 30 tablet, Rfl: 11    losartan (COZAAR) 100 MG tablet, Take 0.5 tablets (50 mg total) by mouth once daily., Disp: 45 tablet, Rfl: 3    traZODone (DESYREL) 50 MG tablet, TAKE 1 TABLET BY MOUTH NIGHTLY AS NEEDED FOR INSOMNIA., Disp: 90 tablet, Rfl: 1    zolpidem (AMBIEN) 5 MG Tab, 1 tablet (5 mg total) by Per G Tube route nightly as needed (difficult with sleep)., Disp: 30 tablet, Rfl: 0    ketorolac 0.5% (ACULAR) 0.5 % Drop, Place 1 drop into the right eye 4 (four) times daily. Start 3 days before surgery., Disp: 5 mL, Rfl: 2    moxifloxacin (VIGAMOX) 0.5 % ophthalmic solution, Place 1 drop into the right eye 4 (four) times daily. Start 1 day before cataract surgery. for 8 days, Disp: 3 mL, Rfl: 0    prednisoLONE acetate (PRED FORTE) 1 % DrpS, Place 1 drop into the right eye 4 (four) times daily. Start after surgery, Disp: 5 mL, Rfl: 2    sodium chloride 1,000 mg TbSO oral tablet, Take 1 tablet (1,000 mg total) by mouth 2 (two) times a day. Please crush the tablets for the PEG tube feeding or see if a capsule form is available., Disp: 100 tablet, Rfl: 2

## 2023-08-01 ENCOUNTER — OFFICE VISIT (OUTPATIENT)
Dept: SURGICAL ONCOLOGY | Facility: CLINIC | Age: 73
End: 2023-08-01
Payer: MEDICARE

## 2023-08-01 VITALS — BODY MASS INDEX: 21.47 KG/M2 | WEIGHT: 133.63 LBS | HEIGHT: 66 IN

## 2023-08-01 DIAGNOSIS — C32.9 LARYNGEAL CANCER: Primary | ICD-10-CM

## 2023-08-01 DIAGNOSIS — C01 MALIGNANT NEOPLASM OF BASE OF TONGUE: ICD-10-CM

## 2023-08-01 PROCEDURE — 3044F PR MOST RECENT HEMOGLOBIN A1C LEVEL <7.0%: ICD-10-PCS | Mod: CPTII,S$GLB,, | Performed by: OTOLARYNGOLOGY

## 2023-08-01 PROCEDURE — 1159F MED LIST DOCD IN RCRD: CPT | Mod: CPTII,S$GLB,, | Performed by: OTOLARYNGOLOGY

## 2023-08-01 PROCEDURE — 3061F NEG MICROALBUMINURIA REV: CPT | Mod: CPTII,S$GLB,, | Performed by: OTOLARYNGOLOGY

## 2023-08-01 PROCEDURE — 1160F PR REVIEW ALL MEDS BY PRESCRIBER/CLIN PHARMACIST DOCUMENTED: ICD-10-PCS | Mod: CPTII,S$GLB,, | Performed by: OTOLARYNGOLOGY

## 2023-08-01 PROCEDURE — 3044F HG A1C LEVEL LT 7.0%: CPT | Mod: CPTII,S$GLB,, | Performed by: OTOLARYNGOLOGY

## 2023-08-01 PROCEDURE — 3288F PR FALLS RISK ASSESSMENT DOCUMENTED: ICD-10-PCS | Mod: CPTII,S$GLB,, | Performed by: OTOLARYNGOLOGY

## 2023-08-01 PROCEDURE — 3066F PR DOCUMENTATION OF TREATMENT FOR NEPHROPATHY: ICD-10-PCS | Mod: CPTII,S$GLB,, | Performed by: OTOLARYNGOLOGY

## 2023-08-01 PROCEDURE — 99999 PR PBB SHADOW E&M-EST. PATIENT-LVL III: CPT | Mod: PBBFAC,,, | Performed by: OTOLARYNGOLOGY

## 2023-08-01 PROCEDURE — 3008F BODY MASS INDEX DOCD: CPT | Mod: CPTII,S$GLB,, | Performed by: OTOLARYNGOLOGY

## 2023-08-01 PROCEDURE — 1160F RVW MEDS BY RX/DR IN RCRD: CPT | Mod: CPTII,S$GLB,, | Performed by: OTOLARYNGOLOGY

## 2023-08-01 PROCEDURE — 99213 PR OFFICE/OUTPT VISIT, EST, LEVL III, 20-29 MIN: ICD-10-PCS | Mod: S$GLB,,, | Performed by: OTOLARYNGOLOGY

## 2023-08-01 PROCEDURE — 1125F PR PAIN SEVERITY QUANTIFIED, PAIN PRESENT: ICD-10-PCS | Mod: CPTII,S$GLB,, | Performed by: OTOLARYNGOLOGY

## 2023-08-01 PROCEDURE — 3288F FALL RISK ASSESSMENT DOCD: CPT | Mod: CPTII,S$GLB,, | Performed by: OTOLARYNGOLOGY

## 2023-08-01 PROCEDURE — 99999 PR PBB SHADOW E&M-EST. PATIENT-LVL III: ICD-10-PCS | Mod: PBBFAC,,, | Performed by: OTOLARYNGOLOGY

## 2023-08-01 PROCEDURE — 3008F PR BODY MASS INDEX (BMI) DOCUMENTED: ICD-10-PCS | Mod: CPTII,S$GLB,, | Performed by: OTOLARYNGOLOGY

## 2023-08-01 PROCEDURE — 1125F AMNT PAIN NOTED PAIN PRSNT: CPT | Mod: CPTII,S$GLB,, | Performed by: OTOLARYNGOLOGY

## 2023-08-01 PROCEDURE — 99213 OFFICE O/P EST LOW 20 MIN: CPT | Mod: S$GLB,,, | Performed by: OTOLARYNGOLOGY

## 2023-08-01 PROCEDURE — 3061F PR NEG MICROALBUMINURIA RESULT DOCUMENTED/REVIEW: ICD-10-PCS | Mod: CPTII,S$GLB,, | Performed by: OTOLARYNGOLOGY

## 2023-08-01 PROCEDURE — 1101F PR PT FALLS ASSESS DOC 0-1 FALLS W/OUT INJ PAST YR: ICD-10-PCS | Mod: CPTII,S$GLB,, | Performed by: OTOLARYNGOLOGY

## 2023-08-01 PROCEDURE — 1159F PR MEDICATION LIST DOCUMENTED IN MEDICAL RECORD: ICD-10-PCS | Mod: CPTII,S$GLB,, | Performed by: OTOLARYNGOLOGY

## 2023-08-01 PROCEDURE — 3066F NEPHROPATHY DOC TX: CPT | Mod: CPTII,S$GLB,, | Performed by: OTOLARYNGOLOGY

## 2023-08-01 PROCEDURE — 1101F PT FALLS ASSESS-DOCD LE1/YR: CPT | Mod: CPTII,S$GLB,, | Performed by: OTOLARYNGOLOGY

## 2023-08-01 NOTE — Clinical Note
Don't know if you know him. He's s/p TL and total glossectomy and has trismus. Could you get him a Therabite? Thanks.

## 2023-08-01 NOTE — Clinical Note
Laryngectomy/glossectomy. Please have him see palliative for pain control.  He can't talk but is facile with the portal. Thanks.

## 2023-08-02 ENCOUNTER — PATIENT MESSAGE (OUTPATIENT)
Dept: SPEECH THERAPY | Facility: HOSPITAL | Age: 73
End: 2023-08-02
Payer: MEDICARE

## 2023-08-02 NOTE — ASSESSMENT & PLAN NOTE
"ZAY.  Palliative referral for assistance with "phantom limb" pain in glossectomy bed.  Therabite for trismus. RTC 1 month.  "

## 2023-08-02 NOTE — PROGRESS NOTES
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 States having some burning pain where the tongue should be.      Oncology History   Larynx cancer   8/4/2020 Initial Diagnosis    Larynx neoplasm malignant     8/6/2020 Tumor Conference    His case was discussed at the Multidisciplinary Head and Neck Team Planning Meeting.    Representatives from Medical Oncology, Radiation Oncology, Head and Neck Surgical Oncology, Psychosocial Oncology, and Speech and Language Pathology discussed the case with the following recommendations:    1) definitive radiation              8/6/2020 Cancer Staged    Staging form: Larynx - Glottis, AJCC 8th Edition  - Clinical stage from 8/6/2020: Stage II (cT2, cN0, cM0)     8/6/2020 Cancer Staged    Cancer Staging  Larynx neoplasm malignant  Staging form: Larynx - Glottis, AJCC 8th Edition  - Clinical stage from 8/6/2020: Stage II (cT2, cN0, cM0) - Signed by Fariha Muñoz NP on 8/6/2020 12/16/2021 Tumor Conference    His case was discussed at the Multidisciplinary Head and Neck Team Planning Meeting.    Representatives from Medical Oncology, Radiation Oncology, Head and Neck Surgical Oncology, Psychosocial Oncology, and Speech and Language Pathology discussed the case with the following recommendations:    1) TL  2) oncology referral  3) psychology referral            12/27/2021 Surgery    1. DL And tracheostomy  2. PEG     1/6/2022 Surgery    1. Diagnostic direct laryngoscopy  2. Bilateral modified neck dissection of levels 2 through 4  3. Total laryngectomy  4. Total thyroidectomy with bilateral paratracheal/upper mediastinal neck dissection  5. Autotransplantation of left inferior parathyroid into left sternocleidomastoid muscle   6. Left anterolateral thigh free flap for closure of total laryngectomy neck skin defect  7. Advancement flap for closure of left thigh donor site advanced area was 25 x 10 cm     1/20/2022 Cancer  Staged    Staging form: Larynx - Glottis, AJCC 8th Edition  - Pathologic stage from 1/20/2022: Stage LOU (pT4a, pN0, cM0)     5/30/2022 Cancer Staged    Staging form: Pharynx - P16 Negative Oropharynx, AJCC 8th Edition  - Clinical: Stage II (rcT2, cN0, cM0)     1/23/2023 -  Chemotherapy    Treatment Summary   Plan Name: OP HEAD NECK CISPLATIN WEEKLY + RADIOTHERAPY  Treatment Goal: Curative  Status: Active  Start Date: 1/23/2023  End Date: 3/6/2023  Provider: Venu Tamez MD  Chemotherapy: CISplatin (Platinol) 40 mg/m2 = 66 mg in sodium chloride 0.9% 631 mL chemo infusion, 40 mg/m2 = 66 mg, Intravenous, Clinic/HOD 1 time, 7 of 7 cycles  Administration: 66 mg (1/23/2023), 66 mg (2/13/2023), 66 mg (2/6/2023), 66 mg (2/20/2023), 66 mg (2/27/2023), 66 mg (3/6/2023)     Malignant neoplasm of base of tongue   5/20/2022 Cancer Staged    Staging form: Pharynx - P16 Negative Oropharynx, AJCC 8th Edition  - Clinical stage from 5/20/2022: Stage II (cT2, cN0, cM0, p16-)     6/22/2022 Initial Diagnosis    Primary cancer of base of tongue     7/11/2022 - 8/26/2022 Chemotherapy    Treatment Summary   Plan Name: OP HEAD NECK PEMBROLIZUMAB CARBOPLATIN FLUOROURACIL Q3W FOLLOWED BY MAINTENANCE PEMBROLIZUMAB 400 MG Q6W  Treatment Goal: Palliative  Status: Inactive  Start Date: 7/11/2022  End Date: 8/26/2022  Provider: Aurash Khoobehi, MD  Chemotherapy: CARBOplatin (PARAPLATIN) 440 mg in sodium chloride 0.9% 544 mL chemo infusion, 440 mg (100 % of original dose 438.5 mg), Intravenous, Clinic/HOD 1 time, 3 of 6 cycles  Dose modification:   (original dose 438.5 mg, Cycle 1)  Administration: 440 mg (7/11/2022), 435 mg (8/1/2022), 510 mg (8/22/2022)     1/23/2023 -  Chemotherapy    Treatment Summary   Plan Name: OP HEAD NECK CISPLATIN WEEKLY + RADIOTHERAPY  Treatment Goal: Curative  Status: Active  Start Date: 1/23/2023  End Date: 3/6/2023  Provider: Venu Tamez MD  Chemotherapy: CISplatin (Platinol) 40 mg/m2 = 66 mg in sodium  chloride 0.9% 631 mL chemo infusion, 40 mg/m2 = 66 mg, Intravenous, Clinic/HOD 1 time, 7 of 7 cycles  Administration: 66 mg (1/23/2023), 66 mg (2/13/2023), 66 mg (2/6/2023), 66 mg (2/20/2023), 66 mg (2/27/2023), 66 mg (3/6/2023)           HPI   73 y.o. male presents with the above treatment history.  He is status post total glossectomy and ALT free flap reconstruction. He reports burning pain in the glossectomy bed and recent worsening trismus.    Review of Systems   Constitutional: Negative for fatigue and unexpected weight change.   HENT: Per HPI.  Eyes: Negative for visual disturbance.   Respiratory: Negative for shortness of breath, hemoptysis   Cardiovascular: Negative for chest pain and palpitations.   Musculoskeletal: Negative for decreased ROM, back pain.   Skin: Negative for rash, sunburn, itching.   Neurological: Negative for dizziness and seizures.   Hematological: Negative for adenopathy. Does not bruise/bleed easily.   Endocrine: Negative for rapid weight loss/weight gain, heat/cold intolerance.     Past Medical History   Patient Active Problem List   Diagnosis    Melanocytic nevus    Body mass index (BMI) of 29.0-29.9 in adult    Benign hypertension    Overweight    Paroxysmal atrial fibrillation    Type 2 diabetes mellitus with diabetic arthropathy, with long-term current use of insulin    Fatty liver disease, nonalcoholic    False positive serological test for hepatitis C    Hyperlipidemia    Type 2 diabetes mellitus with hyperglycemia, without long-term current use of insulin    Gastroesophageal reflux disease without esophagitis    Type 2 diabetes mellitus with hyperglycemia    Vitamin B12 deficiency    Hyperuricemia    Hypovitaminosis D    Proteinuria    On enteral nutrition    Larynx cancer    Dehydration    NSVT (nonsustained ventricular tachycardia)    Adverse effect of adrenal cortical steroids, sequela    S/P laryngectomy    Hypocalcemia    Aphonia    Dysphagia, pharyngoesophageal    Acute  pain of both shoulders    Bilateral arm weakness    Oral bleeding    Malignant neoplasm of base of tongue    Advanced care planning/counseling discussion    Iron deficiency anemia due to chronic blood loss    Postoperative hypothyroidism    Pressure injury of sacral region, stage 1    Pneumatosis intestinalis    Nausea and vomiting    Neutropenia    Abnormal CT scan, neck    Open wound of neck    Open wnd of pharynx    Open wound of left thigh    Open wound of mouth    Tracheostomy in place    Malnutrition of mild degree    Type 2 diabetes mellitus, without long-term current use of insulin    Laryngeal cancer    Hypocalcemia    Hyperbilirubinemia    Elevated transaminase level    Hyponatremia    PEG (percutaneous endoscopic gastrostomy) adjustment/replacement/removal    Hypothyroidism    Dehydration    Pharyngocutaneous fistula    Protein malnutrition    Elevated alkaline phosphatase level    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    Scar conditions and fibrosis of skin    Decreased ROM of neck           Past Surgical History   Past Surgical History:   Procedure Laterality Date    DIRECT LARYNGOBRONCHOSCOPY N/A 12/27/2021    Procedure: LARYNGOSCOPY, DIRECT, WITH BRONCHOSCOPY;  Surgeon: Flex Espinosa MD;  Location: [facility] OR [address] [place] [address];  Service: ENT;  Laterality: N/A;    DIRECT LARYNGOSCOPY  11/9/2022    Procedure: LARYNGOSCOPY, DIRECT;  Surgeon: Jesse James MD;  Location: [address];  Service: ENT;;    DISSECTION OF NECK Bilateral 1/6/2022    Procedure: DISSECTION, NECK;  Surgeon: Jesse James MD;  Location: [facility] OR [address] [place] [address];  Service: ENT;  Laterality: Bilateral;    DISSECTION OF NECK Bilateral 11/9/2022    Procedure: DISSECTION, NECK;  Surgeon: Jesse James MD;  Location: [facility] OR [address] [place] [address];  Service: ENT;  Laterality: Bilateral;    FLAP PROCEDURE Right 1/6/2022    Procedure: CREATION, FREE FLAP;  Surgeon: Alise Hart MD;  Location: [facility] OR [address] [place] [address];  Service: ENT;  Laterality: Right;  Ischemic start  1351  Ischemic stop 1502    FLAP PROCEDURE Left 11/9/2022    Procedure: CREATION, FREE FLAP, ALT;  Surgeon: Alise Hart MD;  Location: [facility] OR 2ND FLR;  Service: ENT;  Laterality: Left;    GLOSSECTOMY Bilateral 11/9/2022    Procedure: TOTAL GLOSSECTOMY;  Surgeon: Jesse James MD;  [facility];  Service: ENT;  Laterality: Bilateral;    INSERTION OF TUNNELED CENTRAL VENOUS CATHETER (CVC) WITH SUBCUTANEOUS PORT N/A 6/9/2022    Procedure: [id number];  Surgeon: Jesus Viera MD;  Location: [facility];  Service: General;  Laterality: N/A;    INSERTION OF TUNNELED CENTRAL VENOUS CATHETER (CVC) WITH SUBCUTANEOUS PORT Right 1/12/2023    Procedure: [id number];  Surgeon: Abdulaziz Le Jr., MD;  Location: [facility];  Service: General;  Laterality: Right;    LARYNGECTOMY N/A 1/6/2022    Procedure: LARYNGECTOMY;  Surgeon: Jesse James MD;  [facility];  Service: ENT;  Laterality: N/A;    LARYNGOSCOPY N/A 8/4/2020    Procedure: Suspension microlaryngoscopy with biopsy, possible KTP laser treatment/excision;  Surgeon: Stew Noel MD;  [facility];  Service: ENT;  Laterality: N/A;  Microscope, telescopes, tower, microinstruments, KTP laser, rep conf# [id number] IC 7/28.    LARYNGOSCOPY N/A 3/16/2021    Procedure: Suspension microlaryngoscopy with excision of lesion, possible CO2 laser;  Surgeon: Stew Noel MD;  [facility];  Service: ENT;  Laterality: N/A;  Microscope, telescopes, tower, microinstruments, CO2 laser, rep conf# [id number] IC 3/4.    LARYNGOSCOPY N/A 4/1/2021    Procedure: Suspension microlaryngoscopy with KTP laser excision of lesion;  Surgeon: Stew Noel MD;  Location: [facility] OR [facility] FLR;  Service: ENT;  Laterality: N/A;  Microscope, telescopes, tower, microinstruments, 70 degree scope, vocal fold , KTP laser, rep conf# [id number] BC    LARYNGOSCOPY N/A 12/9/2021    Procedure: Suspension  microlaryngoscopy with biopsy;  Surgeon: Stew Noel MD;  Location: [facility] OR [facility] FLR;  Service: ENT;  Laterality: N/A;  Microscope, telescopes, tower, microinstruments    LARYNGOSCOPY N/A 1/6/2022    Procedure: LARYNGOSCOPY;  Surgeon: Jesse James MD;  Location: [facility] OR [facility] FLR;  Service: ENT;  Laterality: N/A;    LARYNGOSCOPY N/A 4/27/2022    Procedure: LARYNGOSCOPY WITH BIOPSY;  Surgeon: Jesse James MD;  Location: [facility] OR [facility] FLR;  Service: ENT;  Laterality: N/A;    MICROLARYNGOSCOPY N/A 3/17/2020    Procedure: MICROLARYNGOSCOPY;  Surgeon: Jung Xiao MD;  Location: [facility] OR;  Service: ENT;  Laterality: N/A;  Laser Microlaryngoscopy  NEED TO SCHEDULE LASER from [place] [id number]    PHARYNGECTOMY  11/9/2022    Procedure: TOTAL PHARYNGECTOMY;  Surgeon: Jesse James MD;  [facility];  Service: ENT;;    REIMPLANTATION OF PARATHYROID TISSUE N/A 1/6/2022    Procedure: REIMPLANTATION, PARATHYROID TISSUE;  Surgeon: Jesse James MD;  [facility];  Service: ENT;  Laterality: N/A;    SKIN SPLIT GRAFT Right 11/9/2022    Procedure: APPLICATION, GRAFT, SKIN, SPLIT-THICKNESS;  Surgeon: Jesse James MD;  [facility];  Service: ENT;  Laterality: Right;    THYROIDECTOMY  1/6/2022    Procedure: THYROIDECTOMY;  Surgeon: Jesse James MD;  [facility];  Service: ENT;;    TRACHEOSTOMY N/A 12/27/2021    Procedure: CREATION, TRACHEOSTOMY;  Surgeon: Flex Espinosa MD;  Location: [facility] OR [facility] FLR;  Service: ENT;  Laterality: N/A;         Family History   Family History   Problem Relation Age of Onset    Abnormal EKG Mother     Diabetes Father     Heart disease Father     Hypertension Father            Social History   .  Social History     Socioeconomic History    Marital status:      Spouse name: Janel Batres    Number of children: 2   Occupational History    Occupation: [employer]     Employer: [employer]    Tobacco Use    Smoking status: Never    Smokeless tobacco: Never   Substance and Sexual Activity    Alcohol use: Not Currently     Comment: occasional    Drug use: No    Sexual activity: Yes     Partners: Female   Social History Narrative    ** Merged History Encounter **         2 children from his 1st wife     Social Determinants of Health     Financial Resource Strain: Low Risk  (1/26/2023)    Overall Financial Resource Strain (CARDIA)     Difficulty of Paying Living Expenses: Not hard at all   Food Insecurity: No Food Insecurity (1/26/2023)    Hunger Vital Sign     Worried About Running Out of Food in the Last Year: Never true     Ran Out of Food in the Last Year: Never true   Transportation Needs: No Transportation Needs (1/26/2023)    PRAPARE - Transportation     Lack of Transportation (Medical): No     Lack of Transportation (Non-Medical): No   Physical Activity: Insufficiently Active (1/26/2023)    Exercise Vital Sign     Days of Exercise per Week: 1 day     Minutes of Exercise per Session: 30 min   Stress: Stress Concern Present (11/23/2022)    [employer] - Occupational Stress Questionnaire     Feeling of Stress : To some extent   Social Connections: Socially Isolated (1/26/2023)    Social Connection and Isolation Panel [NHANES]     Frequency of Communication with Friends and Family: Once a week     Frequency of Social Gatherings with Friends and Family: Once a week     Attends Episcopal Services: Never     Active Member of Clubs or Organizations: No     Attends Club or Organization Meetings: Never     Marital Status:    Housing Stability: Low Risk  (1/26/2023)    Housing Stability Vital Sign     Unable to Pay for Housing in the Last Year: No     Number of Places Lived in the Last Year: 1     Unstable Housing in the Last Year: No         Allergies   Review of patient's allergies indicates:   Allergen Reactions    Lovastatin Itching    Pollen extracts     Lovastatin  "Rash     Not confirmed but pt skeptical           Physical Exam     There were no vitals filed for this visit.          Body mass index is 21.56 kg/m².      General: AOx3, NAD   Respiratory:  Symmetric chest rise, normal effort  Oral Cavity:  Flap healthy. No worrisome lesions. Moderate trismus.  Oropharynx:  No masses/lesions of the posterior pharyngeal wall. Tonsillar fossa without lesions. Soft palate without masses. Midline uvula.   Neck: Stoma widely patent. Skin paddle healthy with good color and turgor.Well-healed neck incisions.No LAD. Moderate post-op, post-treatment fibrosis. Submental neck diffusely full.  Face: House Brackmann I bilaterally.    Neopharynx, Trachea: Clear on scope.      Assessment/Plan  Problem List Items Addressed This Visit          Oncology    Laryngeal cancer - Primary    Malignant neoplasm of base of tongue     ZAY.  Palliative referral for assistance with "phantom limb" pain in glossectomy bed.  Therabite for trismus. RTC 1 month.                                      "

## 2023-08-03 ENCOUNTER — OFFICE VISIT (OUTPATIENT)
Dept: FAMILY MEDICINE | Facility: CLINIC | Age: 73
End: 2023-08-03
Payer: MEDICARE

## 2023-08-03 ENCOUNTER — PATIENT MESSAGE (OUTPATIENT)
Dept: PALLIATIVE MEDICINE | Facility: CLINIC | Age: 73
End: 2023-08-03
Payer: MEDICARE

## 2023-08-03 ENCOUNTER — OFFICE VISIT (OUTPATIENT)
Dept: OPHTHALMOLOGY | Facility: CLINIC | Age: 73
End: 2023-08-03
Payer: MEDICARE

## 2023-08-03 VITALS
WEIGHT: 134.13 LBS | HEIGHT: 66 IN | RESPIRATION RATE: 18 BRPM | OXYGEN SATURATION: 99 % | DIASTOLIC BLOOD PRESSURE: 69 MMHG | TEMPERATURE: 98 F | HEART RATE: 78 BPM | BODY MASS INDEX: 21.56 KG/M2 | SYSTOLIC BLOOD PRESSURE: 122 MMHG

## 2023-08-03 DIAGNOSIS — Z01.818 PREOPERATIVE CLEARANCE: Primary | ICD-10-CM

## 2023-08-03 DIAGNOSIS — H25.813 COMBINED FORMS OF AGE-RELATED CATARACT, BILATERAL: Primary | ICD-10-CM

## 2023-08-03 DIAGNOSIS — R73.9 ELEVATED BLOOD SUGAR: ICD-10-CM

## 2023-08-03 DIAGNOSIS — I10 ESSENTIAL HYPERTENSION: ICD-10-CM

## 2023-08-03 DIAGNOSIS — E87.1 HYPONATREMIA: ICD-10-CM

## 2023-08-03 DIAGNOSIS — E89.0 HISTORY OF TOTAL THYROIDECTOMY: ICD-10-CM

## 2023-08-03 DIAGNOSIS — Z93.1 PRESENCE OF EXTERNALLY REMOVABLE PERCUTANEOUS ENDOSCOPIC GASTROSTOMY (PEG) TUBE: ICD-10-CM

## 2023-08-03 DIAGNOSIS — D64.9 NORMOCYTIC NORMOCHROMIC ANEMIA: ICD-10-CM

## 2023-08-03 DIAGNOSIS — R09.89 CHEST CONGESTION: ICD-10-CM

## 2023-08-03 DIAGNOSIS — Z93.0 TRACHEOSTOMY TUBE PRESENT: ICD-10-CM

## 2023-08-03 PROCEDURE — 3044F PR MOST RECENT HEMOGLOBIN A1C LEVEL <7.0%: ICD-10-PCS | Mod: CPTII,S$GLB,, | Performed by: INTERNAL MEDICINE

## 2023-08-03 PROCEDURE — 3061F PR NEG MICROALBUMINURIA RESULT DOCUMENTED/REVIEW: ICD-10-PCS | Mod: CPTII,S$GLB,, | Performed by: OPHTHALMOLOGY

## 2023-08-03 PROCEDURE — 1160F RVW MEDS BY RX/DR IN RCRD: CPT | Mod: CPTII,S$GLB,, | Performed by: OPHTHALMOLOGY

## 2023-08-03 PROCEDURE — 99999 PR PBB SHADOW E&M-EST. PATIENT-LVL III: ICD-10-PCS | Mod: PBBFAC,,, | Performed by: OPHTHALMOLOGY

## 2023-08-03 PROCEDURE — 1126F PR PAIN SEVERITY QUANTIFIED, NO PAIN PRESENT: ICD-10-PCS | Mod: CPTII,S$GLB,, | Performed by: OPHTHALMOLOGY

## 2023-08-03 PROCEDURE — 1101F PR PT FALLS ASSESS DOC 0-1 FALLS W/OUT INJ PAST YR: ICD-10-PCS | Mod: CPTII,S$GLB,, | Performed by: OPHTHALMOLOGY

## 2023-08-03 PROCEDURE — 3066F PR DOCUMENTATION OF TREATMENT FOR NEPHROPATHY: ICD-10-PCS | Mod: CPTII,S$GLB,, | Performed by: INTERNAL MEDICINE

## 2023-08-03 PROCEDURE — 1101F PT FALLS ASSESS-DOCD LE1/YR: CPT | Mod: CPTII,S$GLB,, | Performed by: INTERNAL MEDICINE

## 2023-08-03 PROCEDURE — 3288F FALL RISK ASSESSMENT DOCD: CPT | Mod: CPTII,S$GLB,, | Performed by: INTERNAL MEDICINE

## 2023-08-03 PROCEDURE — 1159F PR MEDICATION LIST DOCUMENTED IN MEDICAL RECORD: ICD-10-PCS | Mod: CPTII,S$GLB,, | Performed by: OPHTHALMOLOGY

## 2023-08-03 PROCEDURE — 1101F PR PT FALLS ASSESS DOC 0-1 FALLS W/OUT INJ PAST YR: ICD-10-PCS | Mod: CPTII,S$GLB,, | Performed by: INTERNAL MEDICINE

## 2023-08-03 PROCEDURE — 1126F PR PAIN SEVERITY QUANTIFIED, NO PAIN PRESENT: ICD-10-PCS | Mod: CPTII,S$GLB,, | Performed by: INTERNAL MEDICINE

## 2023-08-03 PROCEDURE — 1126F AMNT PAIN NOTED NONE PRSNT: CPT | Mod: CPTII,S$GLB,, | Performed by: INTERNAL MEDICINE

## 2023-08-03 PROCEDURE — 3078F DIAST BP <80 MM HG: CPT | Mod: CPTII,S$GLB,, | Performed by: INTERNAL MEDICINE

## 2023-08-03 PROCEDURE — 3044F HG A1C LEVEL LT 7.0%: CPT | Mod: CPTII,S$GLB,, | Performed by: OPHTHALMOLOGY

## 2023-08-03 PROCEDURE — 1159F MED LIST DOCD IN RCRD: CPT | Mod: CPTII,S$GLB,, | Performed by: OPHTHALMOLOGY

## 2023-08-03 PROCEDURE — 99999 PR PBB SHADOW E&M-EST. PATIENT-LVL III: CPT | Mod: PBBFAC,,, | Performed by: OPHTHALMOLOGY

## 2023-08-03 PROCEDURE — 99214 PR OFFICE/OUTPT VISIT, EST, LEVL IV, 30-39 MIN: ICD-10-PCS | Mod: S$GLB,,, | Performed by: INTERNAL MEDICINE

## 2023-08-03 PROCEDURE — 3008F PR BODY MASS INDEX (BMI) DOCUMENTED: ICD-10-PCS | Mod: CPTII,S$GLB,, | Performed by: INTERNAL MEDICINE

## 2023-08-03 PROCEDURE — 3061F PR NEG MICROALBUMINURIA RESULT DOCUMENTED/REVIEW: ICD-10-PCS | Mod: CPTII,S$GLB,, | Performed by: INTERNAL MEDICINE

## 2023-08-03 PROCEDURE — 3061F NEG MICROALBUMINURIA REV: CPT | Mod: CPTII,S$GLB,, | Performed by: INTERNAL MEDICINE

## 2023-08-03 PROCEDURE — 3288F PR FALLS RISK ASSESSMENT DOCUMENTED: ICD-10-PCS | Mod: CPTII,S$GLB,, | Performed by: INTERNAL MEDICINE

## 2023-08-03 PROCEDURE — 3288F PR FALLS RISK ASSESSMENT DOCUMENTED: ICD-10-PCS | Mod: CPTII,S$GLB,, | Performed by: OPHTHALMOLOGY

## 2023-08-03 PROCEDURE — 99214 OFFICE O/P EST MOD 30 MIN: CPT | Mod: S$GLB,,, | Performed by: INTERNAL MEDICINE

## 2023-08-03 PROCEDURE — 3288F FALL RISK ASSESSMENT DOCD: CPT | Mod: CPTII,S$GLB,, | Performed by: OPHTHALMOLOGY

## 2023-08-03 PROCEDURE — 92136 OPHTHALMIC BIOMETRY: CPT | Mod: RT,S$GLB,, | Performed by: OPHTHALMOLOGY

## 2023-08-03 PROCEDURE — 3066F NEPHROPATHY DOC TX: CPT | Mod: CPTII,S$GLB,, | Performed by: OPHTHALMOLOGY

## 2023-08-03 PROCEDURE — 3044F PR MOST RECENT HEMOGLOBIN A1C LEVEL <7.0%: ICD-10-PCS | Mod: CPTII,S$GLB,, | Performed by: OPHTHALMOLOGY

## 2023-08-03 PROCEDURE — 1101F PT FALLS ASSESS-DOCD LE1/YR: CPT | Mod: CPTII,S$GLB,, | Performed by: OPHTHALMOLOGY

## 2023-08-03 PROCEDURE — 3061F NEG MICROALBUMINURIA REV: CPT | Mod: CPTII,S$GLB,, | Performed by: OPHTHALMOLOGY

## 2023-08-03 PROCEDURE — 92136 IOL MASTER - OD - RIGHT EYE: ICD-10-PCS | Mod: RT,S$GLB,, | Performed by: OPHTHALMOLOGY

## 2023-08-03 PROCEDURE — 1160F PR REVIEW ALL MEDS BY PRESCRIBER/CLIN PHARMACIST DOCUMENTED: ICD-10-PCS | Mod: CPTII,S$GLB,, | Performed by: OPHTHALMOLOGY

## 2023-08-03 PROCEDURE — 1126F AMNT PAIN NOTED NONE PRSNT: CPT | Mod: CPTII,S$GLB,, | Performed by: OPHTHALMOLOGY

## 2023-08-03 PROCEDURE — 99214 OFFICE O/P EST MOD 30 MIN: CPT | Mod: S$GLB,,, | Performed by: OPHTHALMOLOGY

## 2023-08-03 PROCEDURE — 3078F PR MOST RECENT DIASTOLIC BLOOD PRESSURE < 80 MM HG: ICD-10-PCS | Mod: CPTII,S$GLB,, | Performed by: INTERNAL MEDICINE

## 2023-08-03 PROCEDURE — 99214 PR OFFICE/OUTPT VISIT, EST, LEVL IV, 30-39 MIN: ICD-10-PCS | Mod: S$GLB,,, | Performed by: OPHTHALMOLOGY

## 2023-08-03 PROCEDURE — 3066F NEPHROPATHY DOC TX: CPT | Mod: CPTII,S$GLB,, | Performed by: INTERNAL MEDICINE

## 2023-08-03 PROCEDURE — 3074F PR MOST RECENT SYSTOLIC BLOOD PRESSURE < 130 MM HG: ICD-10-PCS | Mod: CPTII,S$GLB,, | Performed by: INTERNAL MEDICINE

## 2023-08-03 PROCEDURE — 3074F SYST BP LT 130 MM HG: CPT | Mod: CPTII,S$GLB,, | Performed by: INTERNAL MEDICINE

## 2023-08-03 PROCEDURE — 3066F PR DOCUMENTATION OF TREATMENT FOR NEPHROPATHY: ICD-10-PCS | Mod: CPTII,S$GLB,, | Performed by: OPHTHALMOLOGY

## 2023-08-03 PROCEDURE — 3008F BODY MASS INDEX DOCD: CPT | Mod: CPTII,S$GLB,, | Performed by: INTERNAL MEDICINE

## 2023-08-03 PROCEDURE — 3044F HG A1C LEVEL LT 7.0%: CPT | Mod: CPTII,S$GLB,, | Performed by: INTERNAL MEDICINE

## 2023-08-03 RX ORDER — KETOROLAC TROMETHAMINE 5 MG/ML
1 SOLUTION OPHTHALMIC 4 TIMES DAILY
Qty: 5 ML | Refills: 2 | Status: SHIPPED | OUTPATIENT
Start: 2023-08-03 | End: 2023-10-19 | Stop reason: ALTCHOICE

## 2023-08-03 RX ORDER — TROPICAMIDE 10 MG/ML
1 SOLUTION/ DROPS OPHTHALMIC
Status: CANCELLED | OUTPATIENT
Start: 2023-08-03

## 2023-08-03 RX ORDER — SODIUM CHLORIDE 1 G/1
1000 TABLET ORAL 2 TIMES DAILY
Qty: 100 TABLET | Refills: 2 | Status: SHIPPED | OUTPATIENT
Start: 2023-08-03 | End: 2024-02-05

## 2023-08-03 RX ORDER — MOXIFLOXACIN 5 MG/ML
1 SOLUTION/ DROPS OPHTHALMIC 4 TIMES DAILY
Qty: 3 ML | Refills: 0 | Status: SHIPPED | OUTPATIENT
Start: 2023-08-03 | End: 2023-08-24 | Stop reason: ALTCHOICE

## 2023-08-03 RX ORDER — PHENYLEPHRINE HYDROCHLORIDE 100 MG/ML
1 SOLUTION/ DROPS OPHTHALMIC
Status: CANCELLED | OUTPATIENT
Start: 2023-08-03

## 2023-08-03 RX ORDER — SODIUM CHLORIDE 9 MG/ML
INJECTION, SOLUTION INTRAVENOUS CONTINUOUS
Status: CANCELLED | OUTPATIENT
Start: 2023-08-03

## 2023-08-03 RX ORDER — PROPARACAINE HYDROCHLORIDE 5 MG/ML
1 SOLUTION/ DROPS OPHTHALMIC
Status: CANCELLED | OUTPATIENT
Start: 2023-08-03

## 2023-08-03 RX ORDER — PREDNISOLONE ACETATE 10 MG/ML
1 SUSPENSION/ DROPS OPHTHALMIC 4 TIMES DAILY
Qty: 5 ML | Refills: 2 | Status: SHIPPED | OUTPATIENT
Start: 2023-08-03 | End: 2023-10-19 | Stop reason: ALTCHOICE

## 2023-08-03 RX ORDER — PHENYLEPHRINE HYDROCHLORIDE 25 MG/ML
1 SOLUTION/ DROPS OPHTHALMIC
Status: CANCELLED | OUTPATIENT
Start: 2023-08-03

## 2023-08-03 NOTE — Clinical Note
Patient is stable for proposed cataract surgery under monitored anesthesia care.  I am try to correct his sodium with the limitations that we have given his PEG tube feeding.  He is on parenteral nutrition at this point.    Otherwise no major other contraindication.    Dr. Yesenia DONOVAN

## 2023-08-03 NOTE — PROGRESS NOTES
HPI    Pre op cat sx OD scheduled for 8/16    Pt c/o blurry visoin and glare, can't read fine print or watch TV.  Last edited by Stoney Munoz MD on 8/3/2023  4:14 PM.            Assessment /Plan     For exam results, see Encounter Report.    Combined forms of age-related cataract, bilateral  -     moxifloxacin (VIGAMOX) 0.5 % ophthalmic solution; Place 1 drop into the right eye 4 (four) times daily. Start 1 day before cataract surgery. for 8 days  Dispense: 3 mL; Refill: 0  -     prednisoLONE acetate (PRED FORTE) 1 % DrpS; Place 1 drop into the right eye 4 (four) times daily. Start after surgery  Dispense: 5 mL; Refill: 2  -     ketorolac 0.5% (ACULAR) 0.5 % Drop; Place 1 drop into the right eye 4 (four) times daily. Start 3 days before surgery.  Dispense: 5 mL; Refill: 2  -     Case Request Operating Room: CEIOL OD  -     IOL Master - OD - Right Eye  -     Place in Outpatient; Standing  -     Vital signs; Standing  -     Insert peripheral IV; Standing  -     Diet NPO; Standing  -     POCT glucose; Standing    Other orders  -     0.9%  NaCl infusion  -     proparacaine 0.5 % ophthalmic solution 1 drop  -     tropicamide 1% ophthalmic solution 1 drop  -     phenylephrine HCL 2.5% ophthalmic solution 1 drop  -     phenylephrine HCL 10% ophthalmic solution 1 drop      Patient with visually significant cataract impacting abiliity ADLs (reading, driving, PM driving, glare).  Discussed options, R & B, expectations, patient voices good understanding and wishes to proceed with procedure. Patient will likely benefit from sx and signed consent.    Despite high cyl, pt declines toric  Does need glasses for prism regardless    Proceed with CEIOL OD, monofocal dist IOL

## 2023-08-03 NOTE — Clinical Note
Any suggestions for his nutrition.  He has hyponatremia.  I have asked him to add a little bit of salt to his food and send a prescription for sodium chloride tablets.  Might be difficult to crush since it is available minute tablet form.    Dr. Yesenia DONOVAN

## 2023-08-03 NOTE — Clinical Note
He also coughs periodically with mucus coming out of the tracheostomy tube.  I am not sure if this is going to be a problem while he is having the cataract surgery.  A suction apparatus as a standby might be helpful.  I am sure partly sedated, he should be okay.    Dr. Yesenia DONOVAN

## 2023-08-04 NOTE — SUBJECTIVE & OBJECTIVE
DATE OF SERVICE: 08-04-23 @ 09:49    CHIEF COMPLAINT:Patient is a 51y old  Female who presents with a chief complaint of chest pain    HISTORY OF PRESENT ILLNESS:   51-year-old female with past medical history of HTN, HLD, CAD status post stents (last stent March, 2023) here for evaluation of left thoracic back pain radiating under her axilla, constant, worse with deep inspiration and movement of the upper extremities but intermittently worsening over the past few weeks.  Patient states pain was initially very dull and has since been worsening.  Patient will take Motrin at times for pain which will help improve the pain.  Last night she says the pain was worse than usual, called her cardiologist Dr. Giang who recommended she come to the emergency room for evaluation. Patient took baby aspirin this morning, denies cough, shortness of breath, difficulty breathing, nausea, vomiting, diaphoresis.  No leg pain or swelling, no history of DVTs.        PAST MEDICAL & SURGICAL HISTORY:  Hypertension      CAD (coronary artery disease)      Hyperlipidemia      Anxiety and depression      GERD (gastroesophageal reflux disease)      COVID-19      Benign ovarian tumor, left  and fibroid - partial hysterectomy      S/P cardiac cath  2 stents 12/2020      H/O meniscectomy of right knee              MEDICATIONS:                  FAMILY HISTORY:  Family history of heart disease (Father)  open heart surgery in his 30s, unsure of reason        Non-contributory    SOCIAL HISTORY:    [ ] Tobacco  [ ] Drugs  [ ] Alcohol    Allergies    Wellbutrin (Swelling)    Intolerances    	    REVIEW OF SYSTEMS:  CONSTITUTIONAL: No fever  EYES: No eye pain, visual disturbances, or discharge  ENMT:  No difficulty hearing, tinnitus  NECK: No pain or stiffness  RESPIRATORY: No cough, wheezing,  CARDIOVASCULAR: No chest pain, palpitations, passing out, dizziness, or leg swelling  GASTROINTESTINAL:  No nausea, vomiting, diarrhea or constipation. No melena.  GENITOURINARY: No dysuria, hematuria  NEUROLOGICAL: No stroke like symptoms  SKIN: No burning or lesions   ENDOCRINE: No heat or cold intolerance  MUSCULOSKELETAL: No joint pain or swelling  PSYCHIATRIC: No  anxiety, mood swings  HEME/LYMPH: No bleeding gums  ALLERGY AND IMMUNOLOGIC: No hives or eczema	    All other ROS negative    PHYSICAL EXAM:  T(C): 36.7 (08-04-23 @ 09:30), Max: 36.7 (08-04-23 @ 08:43)  HR: 97 (08-04-23 @ 09:30) (71 - 97)  BP: 128/81 (08-04-23 @ 09:30) (122/80 - 128/81)  RR: 17 (08-04-23 @ 09:30) (17 - 20)  SpO2: 99% (08-04-23 @ 09:30) (97% - 99%)  Wt(kg): --  I&O's Summary      Appearance: Normal	  HEENT:   Normal oral mucosa, EOMI	  Cardiovascular:  S1 S2, No JVD,    Respiratory: Lungs clear to auscultation	  Psychiatry: Alert  Gastrointestinal:  Soft, Non-tender, + BS	  Skin: No rashes   Neurologic: Non-focal  Extremities:  No edema  Vascular: Peripheral pulses palpable    	    	  	  CARDIAC MARKERS:  Labs personally reviewed by me                                  13.4   10.11 )-----------( 242      ( 04 Aug 2023 09:27 )             42.3                 EKG: Personally reviewed by me -   Radiology: Personally reviewed by me -   < from: TTE with Doppler (w/Cont) (03.11.23 @ 10:47) >  Conclusions:  Endocardial visualization enhanced with intravenous  injection of Ultrasonic Enhancing Agent (Definity).  Normal left ventricular internal dimensions with severe  discrete basal septal hypertrophy (1.65 cm).  Normal left ventricular systolic function. Probably no  segmental wall motion abnormalities.    < end of copied text >  < from: Cardiac Catheterization (03.12.23 @ 07:16) >    Conclusions:   Successfully THIAGO x1 to the culprit pRCA 80% lesion. Patent prior LAD  stent with mild ISR and TIMI2 flow with likely    < end of copied text >      Assessment /Plan:     51-year-old female with past medical history of HTN, HLD, CAD status post stents (last stent March, 2023) here for evaluation of left thoracic back pain radiating under her axilla, constant, worse with deep inspiration and movement of the upper extremities but intermittently worsening over the past few weeks.  Patient states pain was initially very dull and has since been worsening.  Patient will take Motrin at times for pain which will help improve the pain.  Last night she says the pain was worse than usual, called her cardiologist Dr. Giang who recommended she come to the emergency room for evaluation. Patient took baby aspirin this morning, denies cough, shortness of breath, difficulty breathing, nausea, vomiting, diaphoresis.  No leg pain or swelling, no history of DVTs.    Problem/Plan - 1:  ·  Problem: Chest pain.   ·  Plan: s/p Cath s/p PCI in March of 2023  Trop=<6  Ekg: NSR  CXR: with clear lungs  Echo from 2021 shows preserved EF (recovered from prior MI when EF was 40%), mild DD  TTE shows EF 73%, severe basal septal hypertrophy, normal LV systolic function, prob no WMA  Now s/p CAth with PCI to RCA  - c/w ASA, plavix, atorvastatin  - Will DC diuretic and start amlodipine for suspected microvascular disease      Problem/Plan - 2:  ·  Problem: CAD (coronary artery disease).   ·  Plan: - Continue with aspirin and Brilinta.  - s/p PCI to m/pLAD in 2020, RCA in March 2023  - Prior TTE shows EF 73%, severe basal septal hypertrophy, normal LV systolic function, prob no WMA  - c/w ASA, Plavix, Atorvastatin  - Check Trop, D-Dimer    Problem/Plan - 3:  ·  Problem: Hypertension.   ·  Plan: low salt, low fat, low cholesterol.  - Continue with losartan, amlodipine    Problem/Plan - 4:  ·  Problem: Hyperlipidemia.   ·  Plan: lipitor.    Problem/Plan - 5:  ·  Problem: Anxiety.   ·  Plan: - Ativan PRN.    Problem/Plan - 6:  ·  Problem: Need for prophylactic measure.   ·  Plan: DASH diet    Differential diagnosis and plan of care discussed with patient after the evaluation. Counseling on diet, nutritional counseling, weight management, exercise and medication compliance was done.   Advanced care planning/advanced directives discussed with patient/family. DNR status including forceful chest compressions to attempt to restart the heart, ventilator support/artificial breathing, electric shock, artificial nutrition, health care proxy, Molst form all discussed with pt. Pt wishes to consider. More than fifteen minutes spent on discussing advanced directives.      Arely HAP-BC  Yunior Giang DO [facility]  Cardiovascular Medicine  [address], [address]  Available for call or text via [employer] TE[employer]  Office [phone]   Interval History: Packing with no drainage. Will dc packing changes. Completes antibiotic course today.     Medications:  Continuous Infusions:  Scheduled Meds:   calcitrioL  0.25 mcg Per G Tube Daily    calcium carbonate  1,000 mg Per G Tube 5x Daily    ceFEPime (MAXIPIME) IVPB  2 g Intravenous Q8H    enoxaparin  40 mg Subcutaneous Daily    micafungin (MYCAMINE) IVPB  100 mg Intravenous Q24H    pantoprazole  40 mg Per G Tube Daily    polyethylene glycol  17 g Per G Tube Daily    sodium chloride 0.9%  10 mL Intravenous Q6H    vancomycin (VANCOCIN) IVPB  1,500 mg Intravenous Q24H     PRN Meds:acetaminophen, dextrose 10%, dextrose 10%, glucagon (human recombinant), hydrALAZINE, insulin aspart U-100, melatonin, ondansetron, oxyCODONE, promethazine (PHENERGAN) IVPB, senna-docusate 8.6-50 mg, sodium chloride 0.9%, Flushing PICC Protocol **AND** sodium chloride 0.9% **AND** sodium chloride 0.9%     Review of patient's allergies indicates:   Allergen Reactions    Lovastatin Itching     Objective:     Vital Signs (24h Range):  Temp:  [96.4 °F (35.8 °C)-97.6 °F (36.4 °C)] 97.4 °F (36.3 °C)  Pulse:  [66-71] 68  Resp:  [16-18] 18  SpO2:  [98 %-100 %] 99 %  BP: (104-130)/(63-72) 104/63     Date 12/11/22 0700 - 12/12/22 0659   Shift 2442-2233 8210-7389 8493-2098 24 Hour Total   INTAKE   NG/   315   Shift Total(mL/kg) 315(5.1)   315(5.1)   OUTPUT   Urine(mL/kg/hr) 500   500   Shift Total(mL/kg) 500(8)   500(8)   Weight (kg) 62.1 62.1 62.1 62.1     Lines/Drains/Airways       Peripherally Inserted Central Catheter Line  Duration             PICC Double Lumen 12/01/22 1017 right brachial 10 days              Drain  Duration                  Gastrostomy/Enterostomy LUQ -- days              Airway  Duration             Adult Surgical Airway Shiley Cuffed -- days         Laryngectomy (Do Not Remove) 11/09/22 Laryngectomy stoma 32 days                    Physical Exam  NAD  Awake and alert  Mild facial edema, stable  Trach within  stoma, removed. Stoma appears healthy  Skin graft to midline neck overlying flap  Small area of dehiscence left laterally, near area of STSG, no drainage  Left area with packing in place, removed entirely   Intraoral flap is soft without fluctuance, secretions thin, clear intraorally    Significant Labs:  CBC:   Recent Labs   Lab 12/11/22  0536   WBC 3.75*   RBC 2.92*   HGB 8.9*   HCT 27.5*      MCV 94   MCH 30.5   MCHC 32.4     CMP:   Recent Labs   Lab 12/11/22  0536   *   CALCIUM 7.6*   ALBUMIN 2.5*   PROT 6.0   *   K 4.2   CO2 22*      BUN 16   CREATININE 0.8   ALKPHOS 742*   ALT 52*   AST 27   BILITOT 1.2*       Significant Diagnostics:  None   DATE OF SERVICE: 08-04-23 @ 09:49    CHIEF COMPLAINT:Patient is a 51y old  Female who presents with a chief complaint of chest pain    HISTORY OF PRESENT ILLNESS:   51-year-old female with past medical history of HTN, HLD, CAD status post stents (last stent March, 2023) here for evaluation of left thoracic back pain radiating under her axilla, constant, worse with deep inspiration and movement of the upper extremities but intermittently worsening over the past few weeks.  Patient states pain was initially very dull and has since been worsening.  Patient will take Motrin at times for pain which will help improve the pain.  Last night she says the pain was worse than usual, called her cardiologist Dr. Giang who recommended she come to the emergency room for evaluation. Patient took baby aspirin this morning, denies cough, shortness of breath, difficulty breathing, nausea, vomiting, diaphoresis.  No leg pain or swelling, no history of DVTs.        PAST MEDICAL & SURGICAL HISTORY:  Hypertension      CAD (coronary artery disease)      Hyperlipidemia      Anxiety and depression      GERD (gastroesophageal reflux disease)      COVID-19      Benign ovarian tumor, left  and fibroid - partial hysterectomy      S/P cardiac cath  2 stents 12/2020      H/O meniscectomy of right knee              MEDICATIONS:  praluent 150mg SQ every 14 days  amlodipine 5mg PO daily  asa 81mg PO daily  atorvastatin 20mg PO daily  coezyme q10 200mg PO daily  ozempic  ticarelor 90mg PO daily  losartan 100mg PO daily      FAMILY HISTORY:  Family history of heart disease (Father)  open heart surgery in his 30s, unsure of reason        Non-contributory    SOCIAL HISTORY:    [- ] Tobacco - former smoker  [- ] Drugs  [- ] Alcohol    Allergies    Wellbutrin (Swelling)    Intolerances    	    REVIEW OF SYSTEMS:  CONSTITUTIONAL: No fever  EYES: No eye pain, visual disturbances, or discharge  ENMT:  No difficulty hearing, tinnitus  NECK: No pain or stiffness  RESPIRATORY: No cough, wheezing,  CARDIOVASCULAR: No chest pain, palpitations, passing out, dizziness, or leg swelling  GASTROINTESTINAL:  No nausea, vomiting, diarrhea or constipation. No melena.  GENITOURINARY: No dysuria, hematuria  NEUROLOGICAL: No stroke like symptoms  SKIN: No burning or lesions   ENDOCRINE: No heat or cold intolerance  MUSCULOSKELETAL: back pain radiating to shoulder  PSYCHIATRIC: No  anxiety, mood swings  HEME/LYMPH: No bleeding gums  ALLERGY AND IMMUNOLOGIC: No hives or eczema	    All other ROS negative    PHYSICAL EXAM:  T(C): 36.7 (08-04-23 @ 09:30), Max: 36.7 (08-04-23 @ 08:43)  HR: 97 (08-04-23 @ 09:30) (71 - 97)  BP: 128/81 (08-04-23 @ 09:30) (122/80 - 128/81)  RR: 17 (08-04-23 @ 09:30) (17 - 20)  SpO2: 99% (08-04-23 @ 09:30) (97% - 99%)  Wt(kg): --  I&O's Summary      Appearance: Normal	  HEENT:   Normal oral mucosa, EOMI	  Cardiovascular:  S1 S2, No JVD,    Respiratory: Lungs clear to auscultation	  Psychiatry: Alert  Gastrointestinal:  Soft, Non-tender, + BS	  Skin: No rashes   Neurologic: Non-focal  Extremities:  No edema  Vascular: Peripheral pulses palpable    	    	  	  CARDIAC MARKERS:  Labs personally reviewed by me                                  13.4   10.11 )-----------( 242      ( 04 Aug 2023 09:27 )             42.3                 EKG: Personally reviewed by me - SR 1st AVB  Radiology: Personally reviewed by me -   < from: TTE with Doppler (w/Cont) (03.11.23 @ 10:47) >  Conclusions:  Endocardial visualization enhanced with intravenous  injection of Ultrasonic Enhancing Agent (Definity).  Normal left ventricular internal dimensions with severe  discrete basal septal hypertrophy (1.65 cm).  Normal left ventricular systolic function. Probably no  segmental wall motion abnormalities.    < end of copied text >  < from: Cardiac Catheterization (03.12.23 @ 07:16) >    Conclusions:   Successfully THIAGO x1 to the culprit pRCA 80% lesion. Patent prior LAD  stent with mild ISR and TIMI2 flow with likely    < end of copied text >      Assessment /Plan:     51-year-old female with past medical history of HTN, HLD, CAD status post stents (last stent March, 2023) here for evaluation of left thoracic back pain radiating under her axilla, constant, worse with deep inspiration and movement of the upper extremities but intermittently worsening over the past few weeks.  Patient states pain was initially very dull and has since been worsening.  Patient will take Motrin at times for pain which will help improve the pain.  Last night she says the pain was worse than usual, called her cardiologist Dr. Giang who recommended she come to the emergency room for evaluation. Patient took baby aspirin this morning, denies cough, shortness of breath, difficulty breathing, nausea, vomiting, diaphoresis.  No leg pain or swelling, no history of DVTs.    Problem/Plan - 1:  ·  Problem: Chest pain.   ·  Plan: s/p Cath s/p PCI LAD in 2020, RCA in March of 2023  ECG NSR 1st AVB  CXR pending  TTE from 3/23 shows EF 73%, severe basal septal hypertrophy, normal LV systolic function, prob no WMA  - c/w ASA, ticagrelor, atorvastatin  -Continue amlodipine for suspected microvascular disease    Problem/Plan - 2:  ·  Problem: CAD (coronary artery disease).   ·  Plan: - Continue with aspirin and Brilinta.  - s/p PCI to m/pLAD in 2020, RCA in March 2023  - Prior TTE shows EF 73%, severe basal septal hypertrophy, normal LV systolic function, prob no WMA  - c/w ASA, Plavix, Atorvastatin  - Check Trop, D-Dimer    Problem/Plan - 3:  ·  Problem: Hypertension.   ·  Plan: low salt, low fat, low cholesterol.  - Continue with losartan, amlodipine    Problem/Plan - 4:  ·  Problem: Hyperlipidemia.   ·  Plan: currently on Praluent since May and atorvastatin 20mg PO daily  - Self reported that recent PCP lipid screening revealed LDL 19 and low total cholesterol  - Please check Lipid panel. Will consider DC statin --> back pain poss 2/2 statin myalgia?    Problem/Plan - 5:  ·  Problem: Anxiety.   ·  Plan: - Ativan PRN.    Problem/Plan - 6:  ·  Problem: Need for prophylactic measure.   ·  Plan: DASH diet    Differential diagnosis and plan of care discussed with patient after the evaluation. Counseling on diet, nutritional counseling, weight management, exercise and medication compliance was done.   Advanced care planning/advanced directives discussed with patient/family. DNR status including forceful chest compressions to attempt to restart the heart, ventilator support/artificial breathing, electric shock, artificial nutrition, health care proxy, Molst form all discussed with pt. Pt wishes to consider. More than fifteen minutes spent on discussing advanced directives.      Arely Hill [facility]-BC  Yunior Giang, DO [facility]  Cardiovascular Medicine  [address] [place] [address] Dr, [address]  Available for call or text via [employer] TE[employer]  Office [phone]   DATE OF SERVICE: 08-04-23 @ 09:49    CHIEF COMPLAINT:Patient is a 51y old  Female who presents with a chief complaint of chest pain    HISTORY OF PRESENT ILLNESS:   51-year-old female with past medical history of HTN, HLD, CAD status post stents (last stent March, 2023) here for evaluation of left thoracic back pain radiating under her axilla, constant, worse with deep inspiration and movement of the upper extremities but intermittently worsening over the past few weeks.  Patient states pain was initially very dull and has since been worsening.  Patient will take Motrin at times for pain which will help improve the pain.  Last night she says the pain was worse than usual, called her cardiologist Dr. Giang who recommended she come to the emergency room for evaluation. Patient took baby aspirin this morning, denies cough, shortness of breath, difficulty breathing, nausea, vomiting, diaphoresis.  No leg pain or swelling, no history of DVTs.        PAST MEDICAL & SURGICAL HISTORY:  Hypertension      CAD (coronary artery disease)      Hyperlipidemia      Anxiety and depression      GERD (gastroesophageal reflux disease)      COVID-19      Benign ovarian tumor, left  and fibroid - partial hysterectomy      S/P cardiac cath  2 stents 12/2020      H/O meniscectomy of right knee              MEDICATIONS:  praluent 150mg SQ every 14 days  amlodipine 5mg PO daily  asa 81mg PO daily  atorvastatin 20mg PO daily  coezyme q10 200mg PO daily  ozempic  ticarelor 90mg PO daily  losartan 100mg PO daily      FAMILY HISTORY:  Family history of heart disease (Father)  open heart surgery in his 30s, unsure of reason        Non-contributory    SOCIAL HISTORY:    [- ] Tobacco - former smoker  [- ] Drugs  [- ] Alcohol    Allergies    Wellbutrin (Swelling)    Intolerances    	    REVIEW OF SYSTEMS:  CONSTITUTIONAL: No fever  EYES: No eye pain, visual disturbances, or discharge  ENMT:  No difficulty hearing, tinnitus  NECK: No pain or stiffness  RESPIRATORY: No cough, wheezing,  CARDIOVASCULAR: No chest pain, palpitations, passing out, dizziness, or leg swelling  GASTROINTESTINAL:  No nausea, vomiting, diarrhea or constipation. No melena.  GENITOURINARY: No dysuria, hematuria  NEUROLOGICAL: No stroke like symptoms  SKIN: No burning or lesions   ENDOCRINE: No heat or cold intolerance  MUSCULOSKELETAL: back pain radiating to shoulder  PSYCHIATRIC: No  anxiety, mood swings  HEME/LYMPH: No bleeding gums  ALLERGY AND IMMUNOLOGIC: No hives or eczema	    All other ROS negative    PHYSICAL EXAM:  T(C): 36.7 (08-04-23 @ 09:30), Max: 36.7 (08-04-23 @ 08:43)  HR: 97 (08-04-23 @ 09:30) (71 - 97)  BP: 128/81 (08-04-23 @ 09:30) (122/80 - 128/81)  RR: 17 (08-04-23 @ 09:30) (17 - 20)  SpO2: 99% (08-04-23 @ 09:30) (97% - 99%)  Wt(kg): --  I&O's Summary      Appearance: Normal	  HEENT:   Normal oral mucosa, EOMI	  Cardiovascular:  S1 S2, No JVD,    Respiratory: Lungs clear to auscultation	  Psychiatry: Alert  Gastrointestinal:  Soft, Non-tender, + BS	  Skin: No rashes   Neurologic: Non-focal  Extremities:  No edema  Vascular: Peripheral pulses palpable    	    	  	  CARDIAC MARKERS:  Labs personally reviewed by me                                  13.4   10.11 )-----------( 242      ( 04 Aug 2023 09:27 )             42.3                 EKG: Personally reviewed by me - SR 1st AVB  Radiology: Personally reviewed by me -   < from: TTE with Doppler (w/Cont) (03.11.23 @ 10:47) >  Conclusions:  Endocardial visualization enhanced with intravenous  injection of Ultrasonic Enhancing Agent (Definity).  Normal left ventricular internal dimensions with severe  discrete basal septal hypertrophy (1.65 cm).  Normal left ventricular systolic function. Probably no  segmental wall motion abnormalities.    < end of copied text >  < from: Cardiac Catheterization (03.12.23 @ 07:16) >    Conclusions:   Successfully THIAGO x1 to the culprit pRCA 80% lesion. Patent prior LAD  stent with mild ISR and TIMI2 flow with likely    < end of copied text >      Assessment /Plan:     51-year-old female with past medical history of HTN, HLD, CAD status post stents (last stent March, 2023) here for evaluation of left thoracic back pain radiating under her axilla, constant, worse with deep inspiration and movement of the upper extremities but intermittently worsening over the past few weeks.  Patient states pain was initially very dull and has since been worsening.  Patient will take Motrin at times for pain which will help improve the pain.  Last night she says the pain was worse than usual, called her cardiologist Dr. Giang who recommended she come to the emergency room for evaluation. Patient took baby aspirin this morning, denies cough, shortness of breath, difficulty breathing, nausea, vomiting, diaphoresis.  No leg pain or swelling, no history of DVTs.    Problem/Plan - 1:  ·  Problem: Chest pain.   ·  Plan: appears MSK and noncardiac in nature  - s/p Cath s/p PCI LAD in 2020, RCA in March of 2023  ECG NSR 1st AVB  CXR pending  TTE from 3/23 shows EF 73%, severe basal septal hypertrophy, normal LV systolic function, prob no WMA  - c/w ASA, ticagrelor, atorvastatin  -Continue amlodipine for suspected microvascular disease    Problem/Plan - 2:  ·  Problem: CAD (coronary artery disease).   ·  Plan: - Continue with aspirin and Brilinta.  - s/p PCI to m/pLAD in 2020, RCA in March 2023  - Prior TTE shows EF 73%, severe basal septal hypertrophy, normal LV systolic function, prob no WMA  - c/w ASA, Plavix, Atorvastatin  - Check Trop, D-Dimer    Problem/Plan - 3:  ·  Problem: Hypertension.   ·  Plan: low salt, low fat, low cholesterol.  - Continue with losartan, amlodipine    Problem/Plan - 4:  ·  Problem: Hyperlipidemia.   ·  Plan: currently on Praluent since May and atorvastatin 20mg PO daily  - Self reported that recent PCP lipid screening revealed LDL 19 and low total cholesterol  - Please check Lipid panel. Will consider DC statin --> back pain poss 2/2 statin myalgia?  - repeat lipid panel as OP and consider d/c statin and continue Praluent    Problem/Plan - 5:  ·  Problem: Anxiety.   ·  Plan: - Ativan PRN.    Problem/Plan - 6:  ·  Problem: Need for prophylactic measure.   ·  Plan: DASH diet          Differential diagnosis and plan of care discussed with patient after the evaluation. Counseling on diet, nutritional counseling, weight management, exercise and medication compliance was done.   Advanced care planning/advanced directives discussed with patient/family. DNR status including forceful chest compressions to attempt to restart the heart, ventilator support/artificial breathing, electric shock, artificial nutrition, health care proxy, Molst form all discussed with pt. Pt wishes to consider. More than fifteen minutes spent on discussing advanced directives.      Arely HAP-BC  Yunior Giang DO [facility]  Cardiovascular Medicine  [address] [place] [address] Dr, [address]  Available for call or text via [employer]  Office [phone]

## 2023-08-07 ENCOUNTER — TELEPHONE (OUTPATIENT)
Dept: HEMATOLOGY/ONCOLOGY | Facility: CLINIC | Age: 73
End: 2023-08-07

## 2023-08-07 ENCOUNTER — CLINICAL SUPPORT (OUTPATIENT)
Dept: REHABILITATION | Facility: HOSPITAL | Age: 73
End: 2023-08-07
Payer: MEDICARE

## 2023-08-07 DIAGNOSIS — R29.898 DECREASED ROM OF NECK: ICD-10-CM

## 2023-08-07 DIAGNOSIS — I89.0 LYMPHEDEMA DUE TO RADIATION: Primary | ICD-10-CM

## 2023-08-07 DIAGNOSIS — L90.5 SCAR CONDITIONS AND FIBROSIS OF SKIN: ICD-10-CM

## 2023-08-07 PROCEDURE — 97140 MANUAL THERAPY 1/> REGIONS: CPT | Mod: PN

## 2023-08-07 NOTE — TELEPHONE ENCOUNTER
NUTRITION NOTE:    RD received message regarding pt's hyponatremia. RD spoke with pt's wife and recommended addition of electrolyte drink via peg once daily to assist in meeting sodium needs. Provided RD contact info and encouraged pt to reach out with any questions or concerns.    Electronically signed by: Barbara Zayas MBA, RDN, LDN

## 2023-08-07 NOTE — PROGRESS NOTES
[facility]   Physical Therapy Treatment Note   Lymphedema - Head and Neck    Name: Cyrus Batres Jr.  Clinic Number: [id number]    Therapy Diagnosis:   Encounter Diagnoses   Name Primary?    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    Scar conditions and fibrosis of skin     Decreased ROM of neck        Physician: Matheus Portillo Jr., MD    Visit Date: 8/7/2023    Physician Orders: PT Eval and Treat   Medical Diagnosis from Referral: Malignant neoplasm of base of tongue  - Primary   Evaluation Date: 5/8/2023  Authorization Period Expiration: 06-  Plan of Care Expiration: 08-  Progress Note Due: v 10  Visit # / Visits authorized: 19/ 30   FOTO: not completed       Time In: 1505   Time Out: 1600  Total Billable Time: 55 minutes    SUBJECTIVE     Nicolás reports: He reports that he is tight and full to the submandibular area, right worse than left .   He recently had a follow up with the surgical oncologist and a Therabite was ordered.   Mr. Batres stated that he cannot open his mouth 2' stiffness.  He reports that he has had symptoms exacerbation of increased lymphedema and tissue tension.  He has been using kinesiotaping and and his compression at night.    Nicolás reported wearing compression outside of therapy visits: Yes - to anterior neck as instructed at initial evaluation  Nicolás was compliant with home exercise program.    Response to previous treatment: excellent  Functional change: improvement in skin quality and cervical spine range of motion     Pain: 0/10  Location: bilateral anterior neck      OBJECTIVE     Girth Measurements:     Girth Measurements (in centimeters)  LANDMARK Head/Neck Measurement  05/ / 07- Face  Right Face Left    Diagonal Head Circumference 55.0 cm / 55.0 cm       Vertical Submental Circumference         Mandibular Angle to Mandibular Angle 27.5 cm /  25.0 cm       Tragus Angle to Tragus Angle 32.0 cm / 32.1 cm       Neck Superior 37.5 cm /   36.0 cm       Neck Middle 34.0 cm / 33.0 cm       Neck Inferior  34.5 cm / 34.3 cm       Tragus to Mental Protuberance         Tragus to Mouth Angle         Mandibular Angle to Nasal Wing         Mandibular Angle to Internal Eye Corner         Mandibular Angle to External Eye Corner         Mental Protuberance to Internal Eye Corner         Mandibular Angle to Mental Protuberance         Totals         Total girth measurement-   220.5 cm / 215.4 cm       Total Girth Reduction   (-) 5.1 cm                Treatment     Nicolás received the treatments listed below:      Patient received from waiting room. He had rebound in     Palpation/Inspection:  Improved skin glide throughout, noted softening of the fibrotic tissues under his chin and along the jaw line.    He has increased active range of motion  to his cervical spine, improving his capacity to look over his shoulder and overhead.      manual therapy techniques: Complete Decongestive Therapy was applied to the: head and neck for 55 minutes, including: manual lymphatic drainage and short stretch compression bandaging     Treatment forcus on decreasing fibrosis and scarring to anterior neck in sx and radiation therapy zone     MANUAL LYMPHATIC DRAINAGE:   Short neck  Prom with end range of motion prolonged passive stretch to anterior chest, shoulder depression  Prolonged passive stretch to left and right rosa-lateral neck soft tissues, x 90 sec each   Long neck  Anterior neck routed posterior to the radiation therapy zone and then to terminus  Fibrosis techniques to scar and fibrosis at anterior neck/radiation therapy zone right of center, using low range prolonged passive stretch with fingertips - bulk of treatment  Mobilization,  cartilages in anterior neck and trachea - NOT completed  S deformation and elongation of SCM and anterior strap muscles, left and right  - NOT completed this date   Soft, small cup use for negative pressure to submandibular area for  enhanced fibrosis management  PPS to left and right SCM, shoulder depression with overpressure, 2 x 90 seconds each  Redo long neck  Deep breathing    08- - active range of motion and stretching of deep neck flexors via seated end range of motion cervical retraction with holding -> cervical extension and upper thoracic extension, hands placed on shoulders and elbows raised.   Completed x 5, x 10.    Therapeutic exercise - not completed this date   x 0 mins for upper body and postural muscle strengthening, activation of muscle and joint pumps.  Deferred 2' soreness from this weekend.    UBE clockwise/counterclockwise level 3.0 x 8 mins total  Doorway stretches, hands high, low, 2 x 30 seconds each  // pushups x 10  Standing scaption, bilateral, to > 90' , 1# x 15    NOT completed this date   Standing cervical retraction, ball behind head, 2 x 10  Horizontal and vertical purple theraband scapular wall slides, left and right, x 15 each  Shoulder extension, bilateral with 8# bar  Standing purple theraband external rotation, left and right x 10 each.  Difficulty on the left 2' muscle weakness  Standing lateral pulls, bilateral, purple x 20      Patient Education and Home Exercises       Home Exercises and Patient Education Provided     Education provided:   - Educated in self massage to abdominal areas, and cervical region  - Patient to leave short-stretch compression bandages intact for 12-24 hours as tolerated, discontinue with any problems, return rolled bandages next session. Wash and wear schedules confirmed.   - Continue HEP of AROM, stretching, and postural correction.   - Educated on self or assisted bandaging, compression options, and risk reduction    06-:   Patient was demonstrated and practices lateral cervical muscle stretch, with one hand anchored under thigh and the other to provide overpressure.  He was instructed to perform with submax tension, and to hold for 90 seconds.    Written Home  Exercises Provided: Patient instructed to cont prior HEP. Exercises were reviewed and Nicolás was able to demonstrate them prior to the end of the session.  Nicolás demonstrated good  understanding of the education provided. See EMR under Patient Instructions for exercises provided during therapy sessions    ASSESSMENT     Nicolás had (+) symptoms exacerbation which responded well to manual therapy interventions this visit.  He has tape and home exercise program to work on while he is out for eye surgery.  He continues to respond to and progress with skilled PT.    Nicolás Is progressing well towards his goals.   Pt prognosis is Good.     Patient will continue to benefit from skilled outpatient physical therapy to address the deficits listed in the problem list box on initial evaluation, provide patient/family education and to maximize patient's level of independence in the home and community environment.     Patient's spiritual, cultural and educational needs considered and patient agreeable to plan of care and goals.     Anticipated barriers to physical therapy: severity of fibrosis and scarring    Goals:   The following goals were discussed with the patient and patient is in agreement with them as to be addressed in the treatment plan.      Short Term Goals: (6 weeks)   Goals: Progressing / MET Date Established Date Assessed   1. Patient will demonstrate decreased localized lymphedema to the jawline by  palpation and visualization of improved symmetry left and right  PROGRESSING 05-  PROGRESSING 06-   2. Patient will demonstrate 100% knowledge of lymphedema precautions and signs of infection to allow for reduced lymphedema risk, infection risk, and/or exacerbation of condition.  PROGRESSING 05-  MET 05-   3. Patient or caregiver will perform self-bandaging techniques and/or wearing of compression garments to allow for lymphatic drainage support, skin elasticity, and reduction in shape and  size of limb.  PROGRESSING 05-  MET 05-   4. Patient will perform self lymph drainage techniques to areas within reach to enhance lymphatic drainage and skin condition.  PROGRESSING 05-  MET 05-   5. Patient will tolerate daily activities with external foam chips compression/ bandaging to allow for lymphatic and venous support.  PROGRESSING 05-  MET 05-      Long Term Goals: (12  weeks)   Goals: Progressing / MET Date Established Date Assessed   1. Patient will show decreased total girth measurement in sum of measurements to head and neck by up to 2 cm  to allow for improved comfort. PROGRESSING 05-  MET 07-   2. Patient will show reduction in density to mild/moderate or less with improved contour of limb to allow for cosmesis, symmetry, infection risk reduction, excursion of sift tissues for swallowing to clear secretions to protect the airway PROGRESSING 05-   PROGRESSING 06-   3. Patient to malinda/doff compression garment with daily compliance to assist in lymphedema management, skin elasticity, and tissue density PROGRESSING 05-  MET 05-   4. Patient to show improved postural awareness and alignment. PROGRESSING 05-  MET 06-   5. Patient to be independent and compliant with home exercise program to allow for increased function in affected limb. PROGRESSING 05-         PLAN     Continue Physical Therapy  2x weekly for 6 weeks Complete Decongestive Therapy:  Manual lymphatic drainage, Multilayered short stretch bandaging, Pneumatic compression, Therapeutic exercises, and Patient education as deemed necessary to achieve stated goals.      Ena Mott, PT CLT   08-

## 2023-08-10 ENCOUNTER — TELEPHONE (OUTPATIENT)
Dept: HEMATOLOGY/ONCOLOGY | Facility: CLINIC | Age: 73
End: 2023-08-10

## 2023-08-10 ENCOUNTER — LAB VISIT (OUTPATIENT)
Dept: LAB | Facility: HOSPITAL | Age: 73
End: 2023-08-10
Attending: NURSE PRACTITIONER
Payer: MEDICARE

## 2023-08-10 ENCOUNTER — TELEPHONE (OUTPATIENT)
Dept: PALLIATIVE MEDICINE | Facility: CLINIC | Age: 73
End: 2023-08-10
Payer: MEDICARE

## 2023-08-10 ENCOUNTER — PATIENT MESSAGE (OUTPATIENT)
Dept: HEMATOLOGY/ONCOLOGY | Facility: CLINIC | Age: 73
End: 2023-08-10

## 2023-08-10 DIAGNOSIS — C01 MALIGNANT NEOPLASM OF BASE OF TONGUE: Primary | ICD-10-CM

## 2023-08-10 DIAGNOSIS — E83.51 HYPOCALCEMIA: ICD-10-CM

## 2023-08-10 DIAGNOSIS — C01 MALIGNANT NEOPLASM OF BASE OF TONGUE: ICD-10-CM

## 2023-08-10 LAB
ALBUMIN SERPL BCP-MCNC: 4.2 G/DL (ref 3.5–5.2)
ALP SERPL-CCNC: 214 U/L (ref 55–135)
ALT SERPL W/O P-5'-P-CCNC: 38 U/L (ref 10–44)
ANION GAP SERPL CALC-SCNC: 11 MMOL/L (ref 8–16)
AST SERPL-CCNC: 39 U/L (ref 10–40)
BASOPHILS # BLD AUTO: 0.02 K/UL (ref 0–0.2)
BASOPHILS NFR BLD: 0.4 % (ref 0–1.9)
BILIRUB SERPL-MCNC: 1.1 MG/DL (ref 0.1–1)
BUN SERPL-MCNC: 23 MG/DL (ref 8–23)
CALCIUM SERPL-MCNC: 7.8 MG/DL (ref 8.7–10.5)
CHLORIDE SERPL-SCNC: 102 MMOL/L (ref 95–110)
CO2 SERPL-SCNC: 27 MMOL/L (ref 23–29)
CREAT SERPL-MCNC: 1.1 MG/DL (ref 0.5–1.4)
DIFFERENTIAL METHOD: ABNORMAL
EOSINOPHIL # BLD AUTO: 0.3 K/UL (ref 0–0.5)
EOSINOPHIL NFR BLD: 5.4 % (ref 0–8)
ERYTHROCYTE [DISTWIDTH] IN BLOOD BY AUTOMATED COUNT: 13.1 % (ref 11.5–14.5)
EST. GFR  (NO RACE VARIABLE): >60 ML/MIN/1.73 M^2
GLUCOSE SERPL-MCNC: 139 MG/DL (ref 70–110)
HCT VFR BLD AUTO: 35.3 % (ref 40–54)
HGB BLD-MCNC: 11.6 G/DL (ref 14–18)
IMM GRANULOCYTES # BLD AUTO: 0.03 K/UL (ref 0–0.04)
IMM GRANULOCYTES NFR BLD AUTO: 0.6 % (ref 0–0.5)
LYMPHOCYTES # BLD AUTO: 0.6 K/UL (ref 1–4.8)
LYMPHOCYTES NFR BLD: 12 % (ref 18–48)
MCH RBC QN AUTO: 31.2 PG (ref 27–31)
MCHC RBC AUTO-ENTMCNC: 32.9 G/DL (ref 32–36)
MCV RBC AUTO: 95 FL (ref 82–98)
MONOCYTES # BLD AUTO: 0.3 K/UL (ref 0.3–1)
MONOCYTES NFR BLD: 5.8 % (ref 4–15)
NEUTROPHILS # BLD AUTO: 3.7 K/UL (ref 1.8–7.7)
NEUTROPHILS NFR BLD: 75.8 % (ref 38–73)
NRBC BLD-RTO: 0 /100 WBC
PLATELET # BLD AUTO: 155 K/UL (ref 150–450)
PMV BLD AUTO: 12 FL (ref 9.2–12.9)
POTASSIUM SERPL-SCNC: 4.1 MMOL/L (ref 3.5–5.1)
PROT SERPL-MCNC: 7.4 G/DL (ref 6–8.4)
RBC # BLD AUTO: 3.72 M/UL (ref 4.6–6.2)
SODIUM SERPL-SCNC: 140 MMOL/L (ref 136–145)
WBC # BLD AUTO: 4.83 K/UL (ref 3.9–12.7)

## 2023-08-10 PROCEDURE — 85025 COMPLETE CBC W/AUTO DIFF WBC: CPT | Performed by: NURSE PRACTITIONER

## 2023-08-10 PROCEDURE — 80053 COMPREHEN METABOLIC PANEL: CPT | Performed by: NURSE PRACTITIONER

## 2023-08-10 PROCEDURE — 36415 COLL VENOUS BLD VENIPUNCTURE: CPT | Performed by: NURSE PRACTITIONER

## 2023-08-10 NOTE — TELEPHONE ENCOUNTER
Spoke to the patient's wife. She informed me that Mr. Batres doesn't speak but she will relay the message to him. We discussed what palliative medicine was and what we do here. I informed her that he was referred here to help manage his symptoms. She expressed her understanding and assured me she will relay the message.

## 2023-08-11 ENCOUNTER — CLINICAL SUPPORT (OUTPATIENT)
Dept: REHABILITATION | Facility: HOSPITAL | Age: 73
End: 2023-08-11
Payer: MEDICARE

## 2023-08-11 ENCOUNTER — DOCUMENTATION ONLY (OUTPATIENT)
Dept: REHABILITATION | Facility: HOSPITAL | Age: 73
End: 2023-08-11

## 2023-08-11 DIAGNOSIS — I89.0 LYMPHEDEMA DUE TO RADIATION: Primary | ICD-10-CM

## 2023-08-11 DIAGNOSIS — L90.5 SCAR CONDITIONS AND FIBROSIS OF SKIN: ICD-10-CM

## 2023-08-11 DIAGNOSIS — R29.898 DECREASED ROM OF NECK: ICD-10-CM

## 2023-08-11 PROCEDURE — 97140 MANUAL THERAPY 1/> REGIONS: CPT | Mod: PN

## 2023-08-11 NOTE — PROGRESS NOTES
[facility]   Physical Therapy Treatment Note   Lymphedema - Head and Neck    Name: Cyrus Batres Jr.  Clinic Number: [id number]    Therapy Diagnosis:   Encounter Diagnoses   Name Primary?    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    Scar conditions and fibrosis of skin     Decreased ROM of neck        Physician: Matheus Portillo Jr., MD    Visit Date: 8/11/2023    Physician Orders: PT Eval and Treat   Medical Diagnosis from Referral: Malignant neoplasm of base of tongue  - Primary   Evaluation Date: 5/8/2023  Authorization Period Expiration: 06-  Plan of Care Expiration: 08-  Progress Note Due: v 10  Visit # / Visits authorized: 20/ 30   FOTO: not completed       Time In: 1015   Time Out: 1100  Total Billable Time: 45 minutes    SUBJECTIVE     Nicolás reports: He reports that he has decreased fullness to the submandibular area compared to prior session.  He reports that he has been doing the cervical retraction and upper thoracic extension to increased active range of motion.  He will be having cataract surgery next Wednesday - cancelled Wed PT appointment.    Nicolás reported wearing compression outside of therapy visits: Yes - to anterior neck as instructed at initial evaluation  Nicolás was compliant with home exercise program.    Response to previous treatment: excellent  Functional change: improvement in skin quality and cervical spine range of motion     Pain: 0/10  Location: bilateral anterior neck      OBJECTIVE     Girth Measurements:     Girth Measurements (in centimeters)  LANDMARK Head/Neck Measurement  05/ / 07- Face  Right Face Left    Diagonal Head Circumference 55.0 cm / 55.0 cm       Vertical Submental Circumference         Mandibular Angle to Mandibular Angle 27.5 cm /  25.0 cm       Tragus Angle to Tragus Angle 32.0 cm / 32.1 cm       Neck Superior 37.5 cm /  36.0 cm       Neck Middle 34.0 cm / 33.0 cm       Neck Inferior  34.5 cm / 34.3 cm       Tragus  to Mental Protuberance         Tragus to Mouth Angle         Mandibular Angle to Nasal Wing         Mandibular Angle to Internal Eye Corner         Mandibular Angle to External Eye Corner         Mental Protuberance to Internal Eye Corner         Mandibular Angle to Mental Protuberance         Totals         Total girth measurement-   220.5 cm / 215.4 cm       Total Girth Reduction   (-) 5.1 cm                Treatment     Nicolás received the treatments listed below:      Patient received from waiting room. He had rebound in     Palpation/Inspection:  Improved skin glide throughout, noted softening of the fibrotic tissues under his chin and along the jaw line.    He has increased active range of motion  to his cervical spine, improving his capacity to look over his shoulder and overhead.      manual therapy techniques: Complete Decongestive Therapy was applied to the: head and neck for 45 minutes, including: manual lymphatic drainage and short stretch compression bandaging     Treatment forcus on decreasing fibrosis and scarring to anterior neck in sx and radiation therapy zone     MANUAL LYMPHATIC DRAINAGE:   Short neck  Prom with end range of motion prolonged passive stretch to anterior chest, shoulder depression  Prolonged passive stretch to left and right rosa-lateral neck soft tissues, x 90 sec each   Long neck  Anterior neck routed posterior to the radiation therapy zone and then to terminus  Fibrosis techniques to scar and fibrosis at anterior neck/radiation therapy zone right of center, using low range prolonged passive stretch with fingertips - bulk of treatment  Mobilization,  cartilages in anterior neck and trachea - NOT completed  S deformation and elongation of SCM and anterior strap muscles, left and right  - NOT completed this date   Soft, small cup use for negative pressure to submandibular area for enhanced fibrosis management  PPS to left and right SCM, shoulder depression with overpressure, 2 x  90 seconds each  Redo long neck  Deep breathing    08- - active range of motion and stretching of deep neck flexors via seated end range of motion cervical retraction with holding -> cervical extension and upper thoracic extension, hands placed on shoulders and elbows raised.   Completed x 5, x 10.    Therapeutic exercise - not completed this date   x 0 mins for upper body and postural muscle strengthening, activation of muscle and joint pumps.  Deferred 2' soreness from this weekend.    UBE clockwise/counterclockwise level 3.0 x 8 mins total  Doorway stretches, hands high, low, 2 x 30 seconds each  // pushups x 10  Standing scaption, bilateral, to > 90' , 1# x 15    NOT completed this date   Standing cervical retraction, ball behind head, 2 x 10  Horizontal and vertical purple theraband scapular wall slides, left and right, x 15 each  Shoulder extension, bilateral with 8# bar  Standing purple theraband external rotation, left and right x 10 each.  Difficulty on the left 2' muscle weakness  Standing lateral pulls, bilateral, purple x 20      Patient Education and Home Exercises       Home Exercises and Patient Education Provided     Education provided:   - Educated in self massage to abdominal areas, and cervical region  - Patient to leave short-stretch compression bandages intact for 12-24 hours as tolerated, discontinue with any problems, return rolled bandages next session. Wash and wear schedules confirmed.   - Continue HEP of AROM, stretching, and postural correction.   - Educated on self or assisted bandaging, compression options, and risk reduction    06-:   Patient was demonstrated and practices lateral cervical muscle stretch, with one hand anchored under thigh and the other to provide overpressure.  He was instructed to perform with submax tension, and to hold for 90 seconds.    Written Home Exercises Provided: Patient instructed to cont prior HEP. Exercises were reviewed and Nicolás was able  to demonstrate them prior to the end of the session.  Nicolás demonstrated good  understanding of the education provided. See EMR under Patient Instructions for exercises provided during therapy sessions    ASSESSMENT     He continues to respond to and progress with skilled PT. He remains engaged and motivated and is compliant with his home exercise program.     Nicolás Is progressing well towards his goals.   Pt prognosis is Good.     Patient will continue to benefit from skilled outpatient physical therapy to address the deficits listed in the problem list box on initial evaluation, provide patient/family education and to maximize patient's level of independence in the home and community environment.     Patient's spiritual, cultural and educational needs considered and patient agreeable to plan of care and goals.     Anticipated barriers to physical therapy: severity of fibrosis and scarring    Goals:   The following goals were discussed with the patient and patient is in agreement with them as to be addressed in the treatment plan.      Short Term Goals: (6 weeks)   Goals: Progressing / MET Date Established Date Assessed   1. Patient will demonstrate decreased localized lymphedema to the jawline by  palpation and visualization of improved symmetry left and right  PROGRESSING 05-  PROGRESSING 06-   2. Patient will demonstrate 100% knowledge of lymphedema precautions and signs of infection to allow for reduced lymphedema risk, infection risk, and/or exacerbation of condition.  PROGRESSING 05-  MET 05-   3. Patient or caregiver will perform self-bandaging techniques and/or wearing of compression garments to allow for lymphatic drainage support, skin elasticity, and reduction in shape and size of limb.  PROGRESSING 05-  MET 05-   4. Patient will perform self lymph drainage techniques to areas within reach to enhance lymphatic drainage and skin condition.  PROGRESSING  05-  MET 05-   5. Patient will tolerate daily activities with external foam chips compression/ bandaging to allow for lymphatic and venous support.  PROGRESSING 05-  MET 05-      Long Term Goals: (12  weeks)   Goals: Progressing / MET Date Established Date Assessed   1. Patient will show decreased total girth measurement in sum of measurements to head and neck by up to 2 cm  to allow for improved comfort. PROGRESSING 05-  MET 07-   2. Patient will show reduction in density to mild/moderate or less with improved contour of limb to allow for cosmesis, symmetry, infection risk reduction, excursion of sift tissues for swallowing to clear secretions to protect the airway PROGRESSING 05-   PROGRESSING 06-   3. Patient to malinda/doff compression garment with daily compliance to assist in lymphedema management, skin elasticity, and tissue density PROGRESSING 05-  MET 05-   4. Patient to show improved postural awareness and alignment. PROGRESSING 05-  MET 06-   5. Patient to be independent and compliant with home exercise program to allow for increased function in affected limb. PROGRESSING 05-         PLAN     Continue Physical Therapy  2x weekly for 6 weeks Complete Decongestive Therapy:  Manual lymphatic drainage, Multilayered short stretch bandaging, Pneumatic compression, Therapeutic exercises, and Patient education as deemed necessary to achieve stated goals.  2.  REASSESS  3.  Add exercise for TMJ active range of motion.     Ena Mott, PT CLT   08-

## 2023-08-11 NOTE — PROGRESS NOTES
Ena Mott, PT (Physical Therapist)   Physical Therapy     [facility]   Physical Therapy Treatment Note   Lymphedema - Head and Neck     Name: Cyrus Batres Jr.  Clinic Number: [id number]     Therapy Diagnosis:        Encounter Diagnoses   Name Primary?    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    Scar conditions and fibrosis of skin      Decreased ROM of neck           Physician: Matheus Portillo Jr., MD     Visit Date: 8/7/2023     Physician Orders: PT Eval and Treat   Medical Diagnosis from Referral: Malignant neoplasm of base of tongue  - Primary   Evaluation Date: 5/8/2023  Authorization Period Expiration: 06-  Plan of Care Expiration: 08-  Progress Note Due: v 10  Visit # / Visits authorized: 19/ 30   FOTO: not completed        Time In: 1505   Time Out: 1600  Total Billable Time: 55 minutes     SUBJECTIVE      Nicolás reports: He reports that he is tight and full to the submandibular area, right worse than left .   He recently had a follow up with the surgical oncologist and a Therabite was ordered.   Mr. Batres stated that he cannot open his mouth 2' stiffness.  He reports that he has had symptoms exacerbation of increased lymphedema and tissue tension.  He has been using kinesiotaping and and his compression at night.     Nicolás reported wearing compression outside of therapy visits: Yes - to anterior neck as instructed at initial evaluation  Nicolás was compliant with home exercise program.     Response to previous treatment: excellent  Functional change: improvement in skin quality and cervical spine range of motion      Pain: 0/10  Location: bilateral anterior neck       OBJECTIVE      Girth Measurements:     Girth Measurements (in centimeters)  LANDMARK Head/Neck Measurement  05/ / 07- Face  Right Face Left    Diagonal Head Circumference 55.0 cm / 55.0 cm       Vertical Submental Circumference         Mandibular Angle to Mandibular Angle 27.5 cm /  25.0 cm        Tragus Angle to Tragus Angle 32.0 cm / 32.1 cm       Neck Superior 37.5 cm /  36.0 cm       Neck Middle 34.0 cm / 33.0 cm       Neck Inferior  34.5 cm / 34.3 cm       Tragus to Mental Protuberance         Tragus to Mouth Angle         Mandibular Angle to Nasal Wing         Mandibular Angle to Internal Eye Corner         Mandibular Angle to External Eye Corner         Mental Protuberance to Internal Eye Corner         Mandibular Angle to Mental Protuberance         Totals         Total girth measurement-   220.5 cm / 215.4 cm       Total Girth Reduction   (-) 5.1 cm                Treatment      Nicolás received the treatments listed below:       Patient received from waiting room. He had rebound in      Palpation/Inspection:  Improved skin glide throughout, noted softening of the fibrotic tissues under his chin and along the jaw line.    He has increased active range of motion  to his cervical spine, improving his capacity to look over his shoulder and overhead.       manual therapy techniques: Complete Decongestive Therapy was applied to the: head and neck for 55 minutes, including: manual lymphatic drainage and short stretch compression bandaging      Treatment forcus on decreasing fibrosis and scarring to anterior neck in sx and radiation therapy zone      MANUAL LYMPHATIC DRAINAGE:   Short neck  Prom with end range of motion prolonged passive stretch to anterior chest, shoulder depression  Prolonged passive stretch to left and right rosa-lateral neck soft tissues, x 90 sec each   Long neck  Anterior neck routed posterior to the radiation therapy zone and then to terminus  Fibrosis techniques to scar and fibrosis at anterior neck/radiation therapy zone right of center, using low range prolonged passive stretch with fingertips - bulk of treatment  Mobilization,  cartilages in anterior neck and trachea - NOT completed  S deformation and elongation of SCM and anterior strap muscles, left and right  - NOT  completed this date   Soft, small cup use for negative pressure to submandibular area for enhanced fibrosis management  PPS to left and right SCM, shoulder depression with overpressure, 2 x 90 seconds each  Redo long neck  Deep breathing     08- - active range of motion and stretching of deep neck flexors via seated end range of motion cervical retraction with holding -> cervical extension and upper thoracic extension, hands placed on shoulders and elbows raised.   Completed x 5, x 10.     Therapeutic exercise - not completed this date   x 0 mins for upper body and postural muscle strengthening, activation of muscle and joint pumps.  Deferred 2' soreness from this weekend.     UBE clockwise/counterclockwise level 3.0 x 8 mins total  Doorway stretches, hands high, low, 2 x 30 seconds each  // pushups x 10  Standing scaption, bilateral, to > 90' , 1# x 15     NOT completed this date   Standing cervical retraction, ball behind head, 2 x 10  Horizontal and vertical purple theraband scapular wall slides, left and right, x 15 each  Shoulder extension, bilateral with 8# bar  Standing purple theraband external rotation, left and right x 10 each.  Difficulty on the left 2' muscle weakness  Standing lateral pulls, bilateral, purple x 20        Patient Education and Home Exercises         Home Exercises and Patient Education Provided      Education provided:   - Educated in self massage to abdominal areas, and cervical region  - Patient to leave short-stretch compression bandages intact for 12-24 hours as tolerated, discontinue with any problems, return rolled bandages next session. Wash and wear schedules confirmed.   - Continue HEP of AROM, stretching, and postural correction.   - Educated on self or assisted bandaging, compression options, and risk reduction     06-:   Patient was demonstrated and practices lateral cervical muscle stretch, with one hand anchored under thigh and the other to provide  overpressure.  He was instructed to perform with submax tension, and to hold for 90 seconds.     Written Home Exercises Provided: Patient instructed to cont prior HEP. Exercises were reviewed and Nicolás was able to demonstrate them prior to the end of the session.  Nicolás demonstrated good  understanding of the education provided. See EMR under Patient Instructions for exercises provided during therapy sessions     ASSESSMENT      Nicolás had (+) symptoms exacerbation which responded well to manual therapy interventions this visit.  He has tape and home exercise program to work on while he is out for eye surgery.  He continues to respond to and progress with skilled PT.     Nicolás Is progressing well towards his goals.   Pt prognosis is Good.      Patient will continue to benefit from skilled outpatient physical therapy to address the deficits listed in the problem list box on initial evaluation, provide patient/family education and to maximize patient's level of independence in the home and community environment.      Patient's spiritual, cultural and educational needs considered and patient agreeable to plan of care and goals.     Anticipated barriers to physical therapy: severity of fibrosis and scarring     Goals:   The following goals were discussed with the patient and patient is in agreement with them as to be addressed in the treatment plan.      Short Term Goals: (6 weeks)   Goals: Progressing / MET Date Established Date Assessed   1. Patient will demonstrate decreased localized lymphedema to the jawline by  palpation and visualization of improved symmetry left and right  PROGRESSING 05-  PROGRESSING 06-   2. Patient will demonstrate 100% knowledge of lymphedema precautions and signs of infection to allow for reduced lymphedema risk, infection risk, and/or exacerbation of condition.  PROGRESSING 05-  MET 05-   3. Patient or caregiver will perform self-bandaging techniques and/or  wearing of compression garments to allow for lymphatic drainage support, skin elasticity, and reduction in shape and size of limb.  PROGRESSING 05-  MET 05-   4. Patient will perform self lymph drainage techniques to areas within reach to enhance lymphatic drainage and skin condition.  PROGRESSING 05-  MET 05-   5. Patient will tolerate daily activities with external foam chips compression/ bandaging to allow for lymphatic and venous support.  PROGRESSING 05-  MET 05-      Long Term Goals: (12  weeks)   Goals: Progressing / MET Date Established Date Assessed   1. Patient will show decreased total girth measurement in sum of measurements to head and neck by up to 2 cm  to allow for improved comfort. PROGRESSING 05-  MET 07-   2. Patient will show reduction in density to mild/moderate or less with improved contour of limb to allow for cosmesis, symmetry, infection risk reduction, excursion of sift tissues for swallowing to clear secretions to protect the airway PROGRESSING 05-   PROGRESSING 06-   3. Patient to malinda/doff compression garment with daily compliance to assist in lymphedema management, skin elasticity, and tissue density PROGRESSING 05-  MET 05-   4. Patient to show improved postural awareness and alignment. PROGRESSING 05-  MET 06-   5. Patient to be independent and compliant with home exercise program to allow for increased function in affected limb. PROGRESSING 05-           PLAN      Continue Physical Therapy  2x weekly for 6 weeks Complete Decongestive Therapy:  Manual lymphatic drainage, Multilayered short stretch bandaging, Pneumatic compression, Therapeutic exercises, and Patient education as deemed necessary to achieve stated goals.        Ena Mott, PT CLT   08-        Plan     Updated Certification Period: 08- to 11-   Recommended Treatment Plan: 2 times per week for  6 weeks:  Manual Therapy, Moist Heat/ Ice, Neuromuscular Re-ed, Patient Education, Self Care, Therapeutic Activities, and Therapeutic Exercise  Other Recommendations: continue per prior plan of care.     Ena Mott, PT

## 2023-08-14 ENCOUNTER — PATIENT MESSAGE (OUTPATIENT)
Dept: SURGERY | Facility: HOSPITAL | Age: 73
End: 2023-08-14

## 2023-08-14 ENCOUNTER — CLINICAL SUPPORT (OUTPATIENT)
Dept: REHABILITATION | Facility: HOSPITAL | Age: 73
End: 2023-08-14
Payer: MEDICARE

## 2023-08-14 DIAGNOSIS — L90.5 SCAR CONDITIONS AND FIBROSIS OF SKIN: ICD-10-CM

## 2023-08-14 DIAGNOSIS — R29.898 DECREASED ROM OF NECK: ICD-10-CM

## 2023-08-14 DIAGNOSIS — I89.0 LYMPHEDEMA DUE TO RADIATION: Primary | ICD-10-CM

## 2023-08-14 PROCEDURE — 97110 THERAPEUTIC EXERCISES: CPT | Mod: PN

## 2023-08-14 PROCEDURE — 97140 MANUAL THERAPY 1/> REGIONS: CPT | Mod: PN

## 2023-08-14 NOTE — PROGRESS NOTES
"[facility]   Physical Therapy Treatment Note   Lymphedema - Head and Neck    Name: Cyrus Batres Jr.  Clinic Number: [id number]    Therapy Diagnosis:   Encounter Diagnoses   Name Primary?    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   He has cataract sx scheduled for this Wednesday.  Will defer PT until next week.    Nicolás reported wearing compression outside of therapy visits: Yes - to anterior neck as instructed at initial evaluation  Nicolás was compliant with home exercise program.    Response to previous treatment: excellent  Functional change: improvement in skin quality and cervical spine range of motion     Pain: 0/10  Location: bilateral anterior neck      OBJECTIVE     Girth Measurements:     Girth Measurements (in centimeters)  LANDMARK Head/Neck Measurement  05/ / 07- Face  Right Face Left    Diagonal Head Circumference 55.0 cm / 55.0 cm       Vertical Submental Circumference         Mandibular Angle to Mandibular Angle 27.5 cm /  25.0 cm       Tragus Angle to Tragus Angle 32.0 cm / 32.1 cm       Neck Superior 37.5 cm /  36.0 cm       Neck Middle 34.0 cm / 33.0 cm       Neck Inferior  34.5 cm / 34.3 cm       Tragus to Mental Protuberance         Tragus to Mouth Angle         Mandibular Angle to Nasal Wing         Mandibular Angle to Internal Eye Corner         Mandibular Angle " to External Eye Corner         Mental Protuberance to Internal Eye Corner         Mandibular Angle to Mental Protuberance         Totals         Total girth measurement-   220.5 cm / 215.4 cm       Total Girth Reduction   (-) 5.1 cm                Treatment     Nicolás received the treatments listed below:      Patient received from waiting room. He had rebound in     Palpation/Inspection:  Improved skin glide throughout, noted softening of the fibrotic tissues under his chin and along the jaw line.    He has increased active range of motion  to his cervical spine, improving his capacity to look over his shoulder and overhead.      manual therapy techniques: Complete Decongestive Therapy was applied to the: head and neck for 45 minutes, including: manual lymphatic drainage and short stretch compression bandaging     Treatment forcus on decreasing fibrosis and scarring to anterior neck in sx and radiation therapy zone     MANUAL LYMPHATIC DRAINAGE:   Short neck  Prom with end range of motion prolonged passive stretch to anterior chest, shoulder depression  Prolonged passive stretch to left and right rosa-lateral neck soft tissues, x 90 sec each   Long neck  Anterior neck routed posterior to the radiation therapy zone and then to terminus  Fibrosis techniques to scar and fibrosis at anterior neck/radiation therapy zone right of center, using low range prolonged passive stretch with fingertips - bulk of treatment  Mobilization,  cartilages in anterior neck and trachea - NOT completed  S deformation and elongation of SCM and anterior strap muscles, left and right  - NOT completed this date   Soft, small cup use for negative pressure to submandibular area for enhanced fibrosis management  PPS to left and right SCM, shoulder depression with overpressure, 2 x 90 seconds each  Redo long neck  Deep breathing    08- - active range of motion and stretching of deep neck flexors via seated end range of motion cervical  "retraction with holding -> cervical extension and upper thoracic extension, hands placed on shoulders and elbows raised.   Completed x 5, x 10.    08-  Patient was demonstrated and practiced self range of motion with over pressure to increase mouth opening:  "Yawn" with anterior glide via overpressure at mandibular angle, 3 x 10 seconds  Thumb along lower lateral teeth apply overpressure down and anterior hook to left and right   Thumb pad on center upper teeth and index and long finger on bottom teeth - overpressure, not at end range of motion, to facilitate increased opening.  4.  He was instructed to apply slow, progressive gentle stretches and to avoid high load overpressure with above       Therapeutic exercise - x 15 mins   x 0 mins for upper body and postural muscle strengthening, activation of muscle and joint pumps.  Deferred 2' soreness from this weekend.    UBE clockwise/counterclockwise level 3.0 x 8 mins total  Standing scaption, bilateral, to > 90' , green theraband x 20  Standing external rotation, elbows at sides, bilateral, green theraband x 20  Shoulder extension, bilateral with 8# bar x 20  Elbow flexion, hands behind back, 8# bar x 20    NOT completed this date   Standing cervical retraction, ball behind head, 2 x 10  Horizontal and vertical purple theraband scapular wall slides, left and right, x 15 each  Standing purple theraband external rotation, left and right x 10 each.  Difficulty on the left 2' muscle weakness  Standing lateral pulls, bilateral, purple x 20  Doorway stretches, hands high, low, 2 x 30 seconds each  // pushups x 10    Patient Education and Home Exercises       Home Exercises and Patient Education Provided     Education provided:   - Educated in self massage to abdominal areas, and cervical region  - Patient to leave short-stretch compression bandages intact for 12-24 hours as tolerated, discontinue with any problems, return rolled bandages next session. Wash and wear " schedules confirmed.   - Continue HEP of AROM, stretching, and postural correction.   - Educated on self or assisted bandaging, compression options, and risk reduction    06-:   Patient was demonstrated and practices lateral cervical muscle stretch, with one hand anchored under thigh and the other to provide overpressure.  He was instructed to perform with submax tension, and to hold for 90 seconds.    Written Home Exercises Provided: Patient instructed to cont prior HEP. Exercises were reviewed and Nicolás was able to demonstrate them prior to the end of the session.  Nicolás demonstrated good  understanding of the education provided. See EMR under Patient Instructions for exercises provided during therapy sessions    ASSESSMENT     He continues to respond to and progress with skilled PT. He has (+) response to manual lymphatic drainage and soft tissue mobilization, by palpation and visualization the fluid load to his anterior submandibular area is decreased compared to initial evaluation. He remains engaged and motivated and is compliant with his home exercise program.     Nicolás Is progressing well towards his goals.   Pt prognosis is Good.     Patient will continue to benefit from skilled outpatient physical therapy to address the deficits listed in the problem list box on initial evaluation, provide patient/family education and to maximize patient's level of independence in the home and community environment.     Patient's spiritual, cultural and educational needs considered and patient agreeable to plan of care and goals.     Anticipated barriers to physical therapy: severity of fibrosis and scarring    Goals:   The following goals were discussed with the patient and patient is in agreement with them as to be addressed in the treatment plan.      Short Term Goals: (6 weeks)   Goals: Progressing / MET Date Established Date Assessed   1. Patient will demonstrate decreased localized lymphedema to the jawline  by  palpation and visualization of improved symmetry left and right  PROGRESSING 05-  PROGRESSING 06-   2. Patient will demonstrate 100% knowledge of lymphedema precautions and signs of infection to allow for reduced lymphedema risk, infection risk, and/or exacerbation of condition.  PROGRESSING 05-  MET 05-   3. Patient or caregiver will perform self-bandaging techniques and/or wearing of compression garments to allow for lymphatic drainage support, skin elasticity, and reduction in shape and size of limb.  PROGRESSING 05-  MET 05-   4. Patient will perform self lymph drainage techniques to areas within reach to enhance lymphatic drainage and skin condition.  PROGRESSING 05-  MET 05-   5. Patient will tolerate daily activities with external foam chips compression/ bandaging to allow for lymphatic and venous support.  PROGRESSING 05-  MET 05-      Long Term Goals: (12  weeks)   Goals: Progressing / MET Date Established Date Assessed   1. Patient will show decreased total girth measurement in sum of measurements to head and neck by up to 2 cm  to allow for improved comfort. PROGRESSING 05-  MET 07-   2. Patient will show reduction in density to mild/moderate or less with improved contour of limb to allow for cosmesis, symmetry, infection risk reduction, excursion of sift tissues for swallowing to clear secretions to protect the airway PROGRESSING 05-   PROGRESSING 06-   3. Patient to malinda/doff compression garment with daily compliance to assist in lymphedema management, skin elasticity, and tissue density PROGRESSING 05-  MET 05-   4. Patient to show improved postural awareness and alignment. PROGRESSING 05-  MET 06-   5. Patient to be independent and compliant with home exercise program to allow for increased function in affected limb. PROGRESSING 05-         PLAN     Continue Physical  Therapy  2x weekly for 6 weeks Complete Decongestive Therapy:  Manual lymphatic drainage, Multilayered short stretch bandaging, Pneumatic compression, Therapeutic exercises, and Patient education as deemed necessary to achieve stated goals.  2.  REASSESS -   3.  Add exercise for TMJ active range of motion.  - completed    Ena Mott, PT CLT   08-

## 2023-08-15 ENCOUNTER — ANESTHESIA EVENT (OUTPATIENT)
Dept: SURGERY | Facility: HOSPITAL | Age: 73
End: 2023-08-15
Payer: MEDICARE

## 2023-08-16 ENCOUNTER — HOSPITAL ENCOUNTER (OUTPATIENT)
Facility: HOSPITAL | Age: 73
Discharge: HOME | End: 2023-08-16
Attending: OPHTHALMOLOGY | Admitting: OPHTHALMOLOGY
Payer: MEDICARE

## 2023-08-16 ENCOUNTER — ANESTHESIA (OUTPATIENT)
Dept: SURGERY | Facility: HOSPITAL | Age: 73
End: 2023-08-16
Payer: MEDICARE

## 2023-08-16 DIAGNOSIS — H25.813 COMBINED FORMS OF AGE-RELATED CATARACT, BILATERAL: ICD-10-CM

## 2023-08-16 PROCEDURE — 37000009 HC ANESTHESIA EA ADD 15 MINS: Performed by: OPHTHALMOLOGY

## 2023-08-16 PROCEDURE — 25000003 PHARM REV CODE 250: Performed by: OPHTHALMOLOGY

## 2023-08-16 PROCEDURE — 66984 PR REMOVAL, CATARACT, W/INSRT INTRAOC LENS, W/O ENDO CYCLO: ICD-10-PCS | Mod: RT,,, | Performed by: OPHTHALMOLOGY

## 2023-08-16 PROCEDURE — 71000033 HC RECOVERY, INTIAL HOUR: Performed by: OPHTHALMOLOGY

## 2023-08-16 PROCEDURE — D9220A PRA ANESTHESIA: ICD-10-PCS | Mod: ANES,,, | Performed by: ANESTHESIOLOGY

## 2023-08-16 PROCEDURE — 25000003 PHARM REV CODE 250: Performed by: ANESTHESIOLOGY

## 2023-08-16 PROCEDURE — 37000008 HC ANESTHESIA 1ST 15 MINUTES: Performed by: OPHTHALMOLOGY

## 2023-08-16 PROCEDURE — 36000706: Performed by: OPHTHALMOLOGY

## 2023-08-16 PROCEDURE — 66984 XCAPSL CTRC RMVL W/O ECP: CPT | Mod: RT,,, | Performed by: OPHTHALMOLOGY

## 2023-08-16 PROCEDURE — D9220A PRA ANESTHESIA: Mod: CRNA,,, | Performed by: NURSE ANESTHETIST, CERTIFIED REGISTERED

## 2023-08-16 PROCEDURE — 36000707: Performed by: OPHTHALMOLOGY

## 2023-08-16 PROCEDURE — V2632 POST CHMBR INTRAOCULAR LENS: HCPCS | Performed by: OPHTHALMOLOGY

## 2023-08-16 PROCEDURE — 63600175 PHARM REV CODE 636 W HCPCS: Performed by: ANESTHESIOLOGY

## 2023-08-16 PROCEDURE — D9220A PRA ANESTHESIA: Mod: ANES,,, | Performed by: ANESTHESIOLOGY

## 2023-08-16 PROCEDURE — 63600175 PHARM REV CODE 636 W HCPCS: Performed by: NURSE ANESTHETIST, CERTIFIED REGISTERED

## 2023-08-16 PROCEDURE — 63600175 PHARM REV CODE 636 W HCPCS: Performed by: OPHTHALMOLOGY

## 2023-08-16 PROCEDURE — D9220A PRA ANESTHESIA: ICD-10-PCS | Mod: CRNA,,, | Performed by: NURSE ANESTHETIST, CERTIFIED REGISTERED

## 2023-08-16 DEVICE — LENS SMPLCTY TECNIS MONO 20.0D: Type: IMPLANTABLE DEVICE | Site: EYE | Status: FUNCTIONAL

## 2023-08-16 RX ORDER — TETRACAINE HYDROCHLORIDE 5 MG/ML
SOLUTION OPHTHALMIC
Status: DISCONTINUED | OUTPATIENT
Start: 2023-08-16 | End: 2023-08-16 | Stop reason: HOSPADM

## 2023-08-16 RX ORDER — ONDANSETRON 2 MG/ML
INJECTION INTRAMUSCULAR; INTRAVENOUS
Status: DISCONTINUED | OUTPATIENT
Start: 2023-08-16 | End: 2023-08-16

## 2023-08-16 RX ORDER — LIDOCAINE HYDROCHLORIDE 40 MG/ML
INJECTION, SOLUTION RETROBULBAR
Status: DISCONTINUED | OUTPATIENT
Start: 2023-08-16 | End: 2023-08-16 | Stop reason: HOSPADM

## 2023-08-16 RX ORDER — LIDOCAINE HYDROCHLORIDE 10 MG/ML
0.5 INJECTION, SOLUTION EPIDURAL; INFILTRATION; INTRACAUDAL; PERINEURAL ONCE
Status: COMPLETED | OUTPATIENT
Start: 2023-08-16 | End: 2023-08-16

## 2023-08-16 RX ORDER — PHENYLEPHRINE HYDROCHLORIDE 100 MG/ML
1 SOLUTION/ DROPS OPHTHALMIC
Status: DISCONTINUED | OUTPATIENT
Start: 2023-08-16 | End: 2023-08-16 | Stop reason: HOSPADM

## 2023-08-16 RX ORDER — SODIUM CHLORIDE, SODIUM LACTATE, POTASSIUM CHLORIDE, CALCIUM CHLORIDE 600; 310; 30; 20 MG/100ML; MG/100ML; MG/100ML; MG/100ML
INJECTION, SOLUTION INTRAVENOUS CONTINUOUS
Status: DISCONTINUED | OUTPATIENT
Start: 2023-08-16 | End: 2023-08-16 | Stop reason: HOSPADM

## 2023-08-16 RX ORDER — TROPICAMIDE 10 MG/ML
1 SOLUTION/ DROPS OPHTHALMIC
Status: DISCONTINUED | OUTPATIENT
Start: 2023-08-16 | End: 2023-08-16 | Stop reason: HOSPADM

## 2023-08-16 RX ORDER — PROPARACAINE HYDROCHLORIDE 5 MG/ML
1 SOLUTION/ DROPS OPHTHALMIC
Status: DISCONTINUED | OUTPATIENT
Start: 2023-08-16 | End: 2023-08-16 | Stop reason: HOSPADM

## 2023-08-16 RX ORDER — PHENYLEPHRINE HYDROCHLORIDE 25 MG/ML
1 SOLUTION/ DROPS OPHTHALMIC
Status: DISCONTINUED | OUTPATIENT
Start: 2023-08-16 | End: 2023-08-16 | Stop reason: HOSPADM

## 2023-08-16 RX ORDER — MIDAZOLAM HYDROCHLORIDE 1 MG/ML
INJECTION, SOLUTION INTRAMUSCULAR; INTRAVENOUS
Status: DISCONTINUED | OUTPATIENT
Start: 2023-08-16 | End: 2023-08-16

## 2023-08-16 RX ORDER — SODIUM CHLORIDE 9 MG/ML
INJECTION, SOLUTION INTRAVENOUS CONTINUOUS
Status: DISCONTINUED | OUTPATIENT
Start: 2023-08-16 | End: 2023-08-16 | Stop reason: HOSPADM

## 2023-08-16 RX ORDER — MOXIFLOXACIN 5 MG/ML
SOLUTION/ DROPS OPHTHALMIC
Status: DISCONTINUED | OUTPATIENT
Start: 2023-08-16 | End: 2023-08-16 | Stop reason: HOSPADM

## 2023-08-16 RX ORDER — EPINEPHRINE 1 MG/ML
INJECTION, SOLUTION, CONCENTRATE INTRAVENOUS
Status: DISCONTINUED | OUTPATIENT
Start: 2023-08-16 | End: 2023-08-16 | Stop reason: HOSPADM

## 2023-08-16 RX ADMIN — SODIUM CHLORIDE, POTASSIUM CHLORIDE, SODIUM LACTATE AND CALCIUM CHLORIDE: 600; 310; 30; 20 INJECTION, SOLUTION INTRAVENOUS at 07:08

## 2023-08-16 RX ADMIN — PHENYLEPHRINE HYDROCHLORIDE 1 DROP: 25 SOLUTION/ DROPS OPHTHALMIC at 07:08

## 2023-08-16 RX ADMIN — MIDAZOLAM 1 MG: 1 INJECTION INTRAMUSCULAR; INTRAVENOUS at 08:08

## 2023-08-16 RX ADMIN — PHENYLEPHRINE HYDROCHLORIDE 1 DROP: 25 SOLUTION/ DROPS OPHTHALMIC at 06:08

## 2023-08-16 RX ADMIN — TROPICAMIDE 1 DROP: 10 SOLUTION/ DROPS OPHTHALMIC at 06:08

## 2023-08-16 RX ADMIN — MIDAZOLAM 1 MG: 1 INJECTION INTRAMUSCULAR; INTRAVENOUS at 07:08

## 2023-08-16 RX ADMIN — ONDANSETRON 4 MG: 2 INJECTION, SOLUTION INTRAMUSCULAR; INTRAVENOUS at 08:08

## 2023-08-16 RX ADMIN — TROPICAMIDE 1 DROP: 10 SOLUTION/ DROPS OPHTHALMIC at 07:08

## 2023-08-16 RX ADMIN — LIDOCAINE HYDROCHLORIDE 0.2 ML: 10 INJECTION, SOLUTION EPIDURAL; INFILTRATION; INTRACAUDAL at 07:08

## 2023-08-16 RX ADMIN — PROPARACAINE HYDROCHLORIDE 1 DROP: 5 SOLUTION/ DROPS OPHTHALMIC at 06:08

## 2023-08-16 RX ADMIN — PROPARACAINE HYDROCHLORIDE 1 DROP: 5 SOLUTION/ DROPS OPHTHALMIC at 07:08

## 2023-08-16 NOTE — OP NOTE
Operative Date:  08/16/2023    Discharge Date:  08/16/2023    Report Title: Operative Note    SURGEON: Stoney Munoz MD    ASSISTANT: None    PREOPERATIVE DIAGNOSIS: Combined form of senile cataract, Right Eye    POSTOPERATIVE DIAGNOSIS: same    PROCEDURE PERFORMED: Phacoemulsification of the cataract with posterior chamber intraocular lens Right Eye    IMPLANTS: DCBOO 20.0    ANESTHESIA:  Topical with MAC    COMPLICATIONS: None    ESTIMATED BLOOD LOSS: Minimal    PROCEDURE: The patient was brought to the operating room, time out was performed and implant checked.  The patient was given light sedation, and topical anesthesia was instilled in the right eye.  The right eye was prepped and draped in the usual fashion for eye surgery and lid speculum used to retract the eyelid. The eyelashes were secluded within the drape.  A paracentesis was made superiorly with a sideport blade. Epishugarcaine was injected in the anterior chamber and dispersive viscoelastic was injected into the anterior chamber. A temporal corneal incision was made with a steel keratome. A cystitome was used to initiate a continuous curvilinear capsulorrhexis and completed using the capsulorrhexis forceps. Hydrodissection of the lens nucleus was performed using balanced salt solution (BSS) on hydrodissection cannula. The lens nucleus was removed using phacoemulsification in the modified stop and chop technique. The lens cortex was removed using the irrigation/aspiration handpiece. The capsular bag was filled with viscoelastic, and the intraocular lens was injected into the capsular bag under direct visualization. Viscoelastic was removed using the irrigation/aspiration handpiece. The wounds were hydrated until watertight.    The wounds were rechecked and no leakage was noted.  The speculum was removed. Topical antibiotic was applied to the eye and shield was placed over the eye. The patient tolerated the procedure well and left the operating room  in good condition.

## 2023-08-16 NOTE — PLAN OF CARE
Pt DC to car by WC with spouse. Drops verified, pt aware to follow up tomorrow morning. Given bag with eye shield, tape and card. DC wearing sunglasses, after talking with Dr Munoz.

## 2023-08-16 NOTE — TRANSFER OF CARE
"Anesthesia Transfer of Care Note    Patient: Cyrus Batres Jr.    Procedure(s) Performed: Procedure(s) (LRB):  CEIOL OD  NPO-peg (Right)    Patient location: PACU    Anesthesia Type: MAC    Transport from OR: Transported from OR on 2-3 L/min O2 by NC with adequate spontaneous ventilation    Post pain: adequate analgesia    Post assessment: no apparent anesthetic complications    Post vital signs: stable    Level of consciousness: awake    Nausea/Vomiting: no nausea/vomiting    Complications: none    Transfer of care protocol was followed      Last vitals:   Visit Vitals  /62   Pulse 61   Temp 36.9 °C (98.4 °F) (Skin)   Resp 20   Ht 5' 6" (1.676 m)   Wt 60.8 kg (134 lb)   SpO2 100%   BMI 21.63 kg/m²     "
Mother did not breast feed yet, states she will when she's ready.

## 2023-08-16 NOTE — DISCHARGE SUMMARY
[facility] - Periop Services  Discharge Note  Short Stay    Procedure(s) (LRB):  CEIOL OD  NPO-peg (Right)      OUTCOME: Patient tolerated treatment/procedure well without complication and is now ready for discharge.    DISPOSITION: Home or Self Care    FINAL DIAGNOSIS:  cataract    FOLLOWUP: In clinic    DISCHARGE INSTRUCTIONS:  No discharge procedures on file.     TIME SPENT ON DISCHARGE: 5 minutes

## 2023-08-16 NOTE — ANESTHESIA PREPROCEDURE EVALUATION
08/16/2023  Cyrus Batres Jr. is a 73 y.o., male.      Pre-op Assessment    I have reviewed the Patient Summary Reports.     I have reviewed the Nursing Notes.       Review of Systems  Anesthesia Hx:  No problems with previous Anesthesia    Cardiovascular:   Hypertension GRIFFIN    Pulmonary:   Shortness of breath    Hepatic/GI:   GERD Liver Disease,    Endocrine:   Diabetes Hypothyroidism        Physical Exam  General: Well nourished        Anesthesia Plan  Type of Anesthesia, risks & benefits discussed:    Anesthesia Type: MAC  Intra-op Monitoring Plan: Standard ASA Monitors  Informed Consent: Informed consent signed with the Patient and all parties understand the risks and agree with anesthesia plan.  All questions answered.   ASA Score: 4  Day of Surgery Review of History & Physical: H&P Update referred to the surgeon/provider.    Ready For Surgery From Anesthesia Perspective.     .

## 2023-08-16 NOTE — H&P
History    Chief complaint:  Painless progressive vision loss right Eye    Present Ilness/Diagnosis: Visually significant cataract, right Eye    ROS: +Eyes, otherwise no significant changes    Past Medical History: refer to chart    Family History/Social History: refer to chart    Allergies:   Review of patient's allergies indicates:   Allergen Reactions    Lovastatin Itching    Pollen extracts     Lovastatin Rash     Not confirmed but pt skeptical       Current Medications: see medcard      Physical Exam    BP: Vital signs stable  General: No apparent distress  HEENT: cataract  Lungs: adequate respirations  Heart: + pulses  Abdomen: soft  Rectal/pelvic: deferred    Labs: Labs Reviewed    Lab Results   Component Value Date    WBC 4.83 08/10/2023    HGB 11.6 (L) 08/10/2023    HCT 35.3 (L) 08/10/2023    MCV 95 08/10/2023     08/10/2023           CMP  Sodium   Date Value Ref Range Status   08/10/2023 140 136 - 145 mmol/L Final     Potassium   Date Value Ref Range Status   08/10/2023 4.1 3.5 - 5.1 mmol/L Final     Chloride   Date Value Ref Range Status   08/10/2023 102 95 - 110 mmol/L Final     CO2   Date Value Ref Range Status   08/10/2023 27 23 - 29 mmol/L Final     Glucose   Date Value Ref Range Status   08/10/2023 139 (H) 70 - 110 mg/dL Final     BUN   Date Value Ref Range Status   08/10/2023 23 8 - 23 mg/dL Final     Creatinine   Date Value Ref Range Status   08/10/2023 1.1 0.5 - 1.4 mg/dL Final     Calcium   Date Value Ref Range Status   08/10/2023 7.8 (L) 8.7 - 10.5 mg/dL Final     Total Protein   Date Value Ref Range Status   08/10/2023 7.4 6.0 - 8.4 g/dL Final     Albumin   Date Value Ref Range Status   08/10/2023 4.2 3.5 - 5.2 g/dL Final   11/18/2020 3.7 3.6 - 5.1 g/dL Final     Comment:     For additional information, please refer to   http://education.[employer].Ready/faq/KVO752 (This link is   being provided for informational/ educational purposes only.)  This test was developed and its  analytical performance   characteristics have been determined by [facility]. It has not been cleared or approved by the   [place] Food and Drug Administration. This assay has been validated   pursuant to the CLIA regulations and is used for clinical   purposes.  @ Test Performed By:  [facility]  Yo Cortes M.D.,   [address] [place] [address]  CLIA  [id number]       Total Bilirubin   Date Value Ref Range Status   08/10/2023 1.1 (H) 0.1 - 1.0 mg/dL Final     Comment:     For infants and newborns, interpretation of results should be based  on gestational age, weight and in agreement with clinical  observations.    Premature Infant recommended reference ranges:  Up to 24 hours.............<8.0 mg/dL  Up to 48 hours............<12.0 mg/dL  3-5 days..................<15.0 mg/dL  6-29 days.................<15.0 mg/dL       Alkaline Phosphatase   Date Value Ref Range Status   08/10/2023 214 (H) 55 - 135 U/L Final     AST   Date Value Ref Range Status   08/10/2023 39 10 - 40 U/L Final     ALT   Date Value Ref Range Status   08/10/2023 38 10 - 44 U/L Final     Anion Gap   Date Value Ref Range Status   08/10/2023 11 8 - 16 mmol/L Final     eGFR   Date Value Ref Range Status   08/10/2023 >60.0 >60 mL/min/1.73 m^2 Final       The patient has been cleared for surgery in an ambulatory surgery facility.     Impression: Visually significant Cataract right Eye    Plan: Phacoemulsification with implantation of Intraocular lens right Eye

## 2023-08-17 ENCOUNTER — TELEPHONE (OUTPATIENT)
Dept: HEMATOLOGY/ONCOLOGY | Facility: CLINIC | Age: 73
End: 2023-08-17
Payer: MEDICARE

## 2023-08-17 ENCOUNTER — OFFICE VISIT (OUTPATIENT)
Dept: OPHTHALMOLOGY | Facility: CLINIC | Age: 73
End: 2023-08-17
Payer: MEDICARE

## 2023-08-17 ENCOUNTER — PATIENT MESSAGE (OUTPATIENT)
Dept: HEMATOLOGY/ONCOLOGY | Facility: CLINIC | Age: 73
End: 2023-08-17
Payer: MEDICARE

## 2023-08-17 VITALS
OXYGEN SATURATION: 99 % | WEIGHT: 134 LBS | BODY MASS INDEX: 21.53 KG/M2 | DIASTOLIC BLOOD PRESSURE: 66 MMHG | HEIGHT: 66 IN | SYSTOLIC BLOOD PRESSURE: 114 MMHG | HEART RATE: 57 BPM | RESPIRATION RATE: 18 BRPM | TEMPERATURE: 98 F

## 2023-08-17 DIAGNOSIS — Z98.41 STATUS POST CATARACT SURGERY, RIGHT: Primary | ICD-10-CM

## 2023-08-17 PROCEDURE — 3066F NEPHROPATHY DOC TX: CPT | Mod: CPTII,S$GLB,, | Performed by: OPHTHALMOLOGY

## 2023-08-17 PROCEDURE — 3044F PR MOST RECENT HEMOGLOBIN A1C LEVEL <7.0%: ICD-10-PCS | Mod: CPTII,S$GLB,, | Performed by: OPHTHALMOLOGY

## 2023-08-17 PROCEDURE — 1159F PR MEDICATION LIST DOCUMENTED IN MEDICAL RECORD: ICD-10-PCS | Mod: CPTII,S$GLB,, | Performed by: OPHTHALMOLOGY

## 2023-08-17 PROCEDURE — 1126F AMNT PAIN NOTED NONE PRSNT: CPT | Mod: CPTII,S$GLB,, | Performed by: OPHTHALMOLOGY

## 2023-08-17 PROCEDURE — 3288F FALL RISK ASSESSMENT DOCD: CPT | Mod: CPTII,S$GLB,, | Performed by: OPHTHALMOLOGY

## 2023-08-17 PROCEDURE — 1159F MED LIST DOCD IN RCRD: CPT | Mod: CPTII,S$GLB,, | Performed by: OPHTHALMOLOGY

## 2023-08-17 PROCEDURE — 1101F PT FALLS ASSESS-DOCD LE1/YR: CPT | Mod: CPTII,S$GLB,, | Performed by: OPHTHALMOLOGY

## 2023-08-17 PROCEDURE — 99024 PR POST-OP FOLLOW-UP VISIT: ICD-10-PCS | Mod: S$GLB,,, | Performed by: OPHTHALMOLOGY

## 2023-08-17 PROCEDURE — 3061F NEG MICROALBUMINURIA REV: CPT | Mod: CPTII,S$GLB,, | Performed by: OPHTHALMOLOGY

## 2023-08-17 PROCEDURE — 1101F PR PT FALLS ASSESS DOC 0-1 FALLS W/OUT INJ PAST YR: ICD-10-PCS | Mod: CPTII,S$GLB,, | Performed by: OPHTHALMOLOGY

## 2023-08-17 PROCEDURE — 1126F PR PAIN SEVERITY QUANTIFIED, NO PAIN PRESENT: ICD-10-PCS | Mod: CPTII,S$GLB,, | Performed by: OPHTHALMOLOGY

## 2023-08-17 PROCEDURE — 3044F HG A1C LEVEL LT 7.0%: CPT | Mod: CPTII,S$GLB,, | Performed by: OPHTHALMOLOGY

## 2023-08-17 PROCEDURE — 99024 POSTOP FOLLOW-UP VISIT: CPT | Mod: S$GLB,,, | Performed by: OPHTHALMOLOGY

## 2023-08-17 PROCEDURE — 3061F PR NEG MICROALBUMINURIA RESULT DOCUMENTED/REVIEW: ICD-10-PCS | Mod: CPTII,S$GLB,, | Performed by: OPHTHALMOLOGY

## 2023-08-17 PROCEDURE — 99999 PR PBB SHADOW E&M-EST. PATIENT-LVL III: ICD-10-PCS | Mod: PBBFAC,,, | Performed by: OPHTHALMOLOGY

## 2023-08-17 PROCEDURE — 99999 PR PBB SHADOW E&M-EST. PATIENT-LVL III: CPT | Mod: PBBFAC,,, | Performed by: OPHTHALMOLOGY

## 2023-08-17 PROCEDURE — 3288F PR FALLS RISK ASSESSMENT DOCUMENTED: ICD-10-PCS | Mod: CPTII,S$GLB,, | Performed by: OPHTHALMOLOGY

## 2023-08-17 PROCEDURE — 3066F PR DOCUMENTATION OF TREATMENT FOR NEPHROPATHY: ICD-10-PCS | Mod: CPTII,S$GLB,, | Performed by: OPHTHALMOLOGY

## 2023-08-17 NOTE — PROGRESS NOTES
HPI    1 day s/p phaco IOL OD done on 8/16/2023  Pt states OD feels well. Denies pain/ FOL/ floaters.   Compliant with post op gtts. Using pred, moxi, and keto as directed.     Last edited by Phoebe Condon on 8/17/2023  8:44 AM.            Assessment /Plan     For exam results, see Encounter Report.    Status post cataract surgery, right      Doing well  Post op precautions and instructions reviewed, sheet given  moxi QID  PF QID  Keto qdaily    F/u 1 week, brief refract OD, PW for OS

## 2023-08-17 NOTE — TELEPHONE ENCOUNTER
I spoke with the patient's wife, Janel, about getting an IO appt scheduled per referral. Patient is non-verbal. I explained in detail what we offer and listed some symptoms/side effects that we can help address using our support services. She verbalized understanding and accepted a date/time around other appts. Location was reviewed and a message was sent to the portal if they had any follow up questions.

## 2023-08-18 NOTE — ANESTHESIA POSTPROCEDURE EVALUATION
Anesthesia Post Evaluation    Patient: Cyrus Batres Jr.    Procedure(s) Performed: Procedure(s) (LRB):  CEIOL OD  NPO-peg (Right)    Final Anesthesia Type: MAC      Patient location during evaluation: PACU  Patient participation: Yes- Able to Participate  Level of consciousness: awake and alert  Post-procedure vital signs: reviewed and stable  Pain management: adequate  Airway patency: patent    PONV status at discharge: No PONV  Anesthetic complications: no      Cardiovascular status: blood pressure returned to baseline  Respiratory status: unassisted and room air  Hydration status: euvolemic  Follow-up not needed.          Vitals Value Taken Time   /66 08/16/23 0900   Temp 36.7 °C (98 °F) 08/16/23 0832   Pulse 57 08/16/23 0900   Resp 18 08/16/23 0900   SpO2 99 % 08/16/23 0900         Event Time   Out of Recovery 09:00:00         Pain/Alexi Score: No data recorded

## 2023-08-22 NOTE — PROGRESS NOTES
[facility] Hematology/Oncology  PROGRESS NOTE -  Follow-up Visit      Subjective:       Patient ID:   NAME: Cyrus Batres Jr. : 1950     73 y.o. 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  The patient is here today to go over the results of the recently ordered labs, tests and studies. He is here by himself.    He had cataract surgery on  and did well    He has since completed chemotherapy and  XRT.  He seems to have tolerated the regimen fairly well . He saw Dr James with ENT on 2023 and had repeat scope with good report per patient.    PT was completed but patient continued to see benefit - will see if we can renew    He saw Dr Patterson on 8/3, Dr Portillo on 2023    He had last PET on  which overall looks adequate    He is doing well and is active at home.            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 He had his portacath removed on  and replaced on 2023 by Dr Le    He is breathing ok. No HA's or CP; no pain at this time        Discussed covid19 and he has been vaccinated      ROS:   GEN: normal without any fever, night sweats or weight loss; doing well with tube feeds   HEENT: normal with no HA's, sore throat, stiff neck, changes in vision  CV: normal with no CP, SOB, PND, no currentDOE  PULM: normal with no SOB, cough, hemoptysis, sputum or  pleuritic pain  GI: no current N/V  ; has peg tube;    : normal with no hematuria, dysuria  BREAST: normal with no mass, discharge, pain  SKIN: normal with no rash, erythema, bruising, or swelling     Pain Scale:  0    Allergies:  Review of patient's allergies indicates:   Allergen Reactions    Lovastatin Itching    Pollen extracts     Lovastatin Rash     Not confirmed but pt skeptical       Medications:    Current Outpatient Medications:     acetaminophen (TYLENOL) 325 MG tablet, Take 325 mg by mouth every 6 (six) hours as needed for Pain., Disp: , Rfl:     calcium carbonate 500 mg/5 mL (1,250 mg/5 mL), Take 20 mls four times daily with meals and nightly., Disp: 2300 mL, Rfl: 12    esomeprazole (NEXIUM) 40 MG capsule, 40 mg before breakfast., Disp: , Rfl:     famotidine (PEPCID) 40 mg/5 mL (8 mg/mL) suspension, Give 2.5 mLs (20 mg total) by Per G Tube route 2 (two) times daily., Disp: 50 mL, Rfl: 11    gabapentin (NEURONTIN) 100 MG capsule, Take 1 capsule (100 mg total) by mouth 3 (three) times daily for 7 days, THEN 2 capsules (200 mg total) 3 (three) times daily for 7 days, THEN 3 capsules (300 mg total) 3 (three) times daily for 14 days., Disp: 189 capsule, Rfl: 0    ibuprofen (ADVIL,MOTRIN) 200 MG tablet, Take 200 mg by mouth every 6 (six) hours as needed for Pain., Disp: , Rfl:     ketorolac 0.5% (ACULAR) 0.5 % Drop, Place 1 drop into the right eye 4 (four) times daily. Start 3 days before surgery., Disp: 5 mL, Rfl: 2    lactose-reduced food with fibr (JEVITY 1.5 TRISHA) 0.06 gram-1.5 kcal/mL Liqd, 6 cartons per day via peg.  Flush 60ml before and after each bolus, Disp: 180 each, Rfl: 11    levothyroxine (SYNTHROID) 100 MCG tablet, 1 tablet (100 mcg total) by Per G Tube route before breakfast., Disp: 30 tablet, Rfl: 11    losartan (COZAAR) 100 MG tablet, Take 0.5 tablets (50 mg total) by mouth once daily., Disp: 45 tablet, Rfl: 3    prednisoLONE acetate (PRED FORTE) 1 % DrpS, Place 1 drop into the right eye 4  "(four) times daily. Start after surgery, Disp: 5 mL, Rfl: 2    sodium chloride 1,000 mg TbSO oral tablet, Take 1 tablet (1,000 mg total) by mouth 2 (two) times a day. Please crush the tablets for the PEG tube feeding or see if a capsule form is available., Disp: 100 tablet, Rfl: 2    traZODone (DESYREL) 50 MG tablet, TAKE 1 TABLET BY MOUTH NIGHTLY AS NEEDED FOR INSOMNIA., Disp: 90 tablet, Rfl: 1    zolpidem (AMBIEN) 5 MG Tab, 1 tablet (5 mg total) by Per G Tube route nightly as needed (difficult with sleep)., Disp: 30 tablet, Rfl: 0  No current facility-administered medications for this visit.    PMHx/PSHx Updates:  See patient's last visit with me on 3/6/2023  See H&P on 9/23/2022        Pathology:   Cancer Staging   Larynx cancer  Staging form: Larynx - Glottis, AJCC 8th Edition  - Clinical stage from 8/6/2020: Stage II (cT2, cN0, cM0) - Signed by Fariha Muñoz NP on 8/6/2020  - Pathologic stage from 1/20/2022: Stage LOU (pT4a, pN0, cM0) - Signed by Fariha Muñoz NP on 1/20/2022  Staging form: Pharynx - P16 Negative Oropharynx, AJCC 8th Edition  - Clinical: Stage II (rcT2, cN0, cM0) - Signed by Matheus Portillo Jr., MD on 5/30/2022    Malignant neoplasm of base of tongue  Staging form: Pharynx - P16 Negative Oropharynx, AJCC 8th Edition  - Clinical stage from 5/20/2022: Stage II (cT2, cN0, cM0, p16-) - Signed by Saúl Kessler MD on 7/7/2022    Dissection 11/9/2022:    Final Pathologic Diagnosis 1. "left submandibular lymph node", dissection:       - Three lymph nodes, negative for carcinoma (0/3)   2. 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       - Tumor size: 4.5 cm       - Resection margins: left superior pharyngeal margin focally involved;   all other margins are negative for carcinoma       - Left internal jugular vein: free of tumor       - One lymph node, negative for carcinoma (0/1) "   Note: P16 immunostain is negative     Tumor Site       Oropharynx  involving Base of tongue       Tumor Laterality       Midline       Tumor Size       Greatest Dimension (Centimeters) 4.5 cm         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)       Histologic Grade       G2 to G3: Moderate to Poorly differentiated       Lymphovascular Invasion       Not identified       Perineural Invasion       Not identified     MARGINS      Margins       Involved by invasive tumor     Margin(s)   Involved by Invasive Tumor:      left superior pharyngeal margin; all other   margins are free of tumor     LYMPH NODES    Regional Lymph Node Status:    :     Regional Lymph Node   Status:      All regional lymph nodes negative for tumor      pT Category:               pT3   pN Category:               pN0      Base of Tongue Biopsy  4/27/2022:  Final Pathologic Diagnosis BIOPSY OF BASE OF THE TONGUE:   THE INFILTRATING POORLY DIFFERENTIATED SQUAMOUS CELL CARCINOMA   THE TUMOR IS P16 NEGATIVE.  THE POSITIVE AND NEGATIVE CONTROLS STAINED   APPROPRIATELY      Larynx resection/thyroidectomy 1/6/2022:     Final Pathologic Diagnosis 1. Lymph nodes, left neck levels 2,  3 and 4, dissection:   - Nineteen lymph nodes, all  negative  for metastatic carcinoma (0/19)   2. Lymph nodes, right neck levels 2,  3 and 4, dissection:   - Twenty-eight lymph nodes, all  negative  for metastatic carcinoma (0/28)   3. Possible parathyroid gland, excision:   - Benign parathyroid gland tissue (0.003 g)   4. Larynx, thyroid and bilateral level 6 lymph nodes, total laryngectomy,   total thyroidectomy and bilateral level 6 lymph node dissection:   - Invasive, well to moderately differentiated, keratinizing squamous cell   carcinoma (4.2 cm)   - Left lobe of thyroid gland,  positive  for invasive squamous cell carcinoma   - Margins  negative  for invasive carcinoma or dysplasia   - Three lymph nodes,  negative  for metastatic carcinoma (0/3)  "  - See synoptic report below for details and complete pathologic staging   5. Soft tissue, posterior tracheal margin, excision:   - Benign, focally reactive respiratory mucosa with submucosal acute   inflammation,  negative  for dysplasia or malignancy   6. Soft tissue, anterior tracheal margin, excision:   - Benign respiratory mucosa with subepithelial cartilage,  negative  for   dysplasia or malignancy   7. Soft tissue, posterior tracheal margin #2, excision:   - Benign, focally reactive respiratory mucosa with submucosal acute   inflammation,  negative  for dysplasia or malignancy   8. Soft tissue, anterior tracheal margin #2, excision:   - Benign, reactive focally ulcerated respiratory mucosa with submucosal acute   inflammation,  negative  for dysplasia or malignancy             Laryngoscope 8/4/2020: (with Dr Noel):  Final Pathologic Diagnosis 1.  Left true vocal fold, biopsy:       -  Invasive moderately differentiated squamous cell carcinoma,   keratinizing type   2.  Left true vocal fold, biopsy:       -  Invasive moderately differentiated squamous cell carcinoma,   keratinizing type             Laryngoscope 3/17/2020 (with Dr Xiao):  Final Pathologic Diagnosis 1.  BIOPSY OF LEFT TRUE VOCAL CORD:   SEVERELY DYSPLASTIC APPEARING SQUAMOUS MUCOSA   INVASIVE CARCINOMA IS NOT DOCUMENTED   ON THE OTHER HAND, THESE FRAGMENTS ARE NODUL AND WITHOUT SUBMUCOSA FOR   EVALUATION; IT IS POSSIBLE THAT THEY REFLECT INVASIVE SQUAMOUS CARCINOMA   2.  BIOPSY OF LEFT ANTERIOR COMMISSURE:   MODERATE DYSPLASIA   NO INVASIVE CARCINOMA IDENTIFIED             Objective:     Vitals:  Blood pressure 102/65, pulse 69, temperature 98 °F (36.7 °C), resp. rate 16, height 5' 6" (1.676 m), weight 62.7 kg (138 lb 4.8 oz).    Physical Examination:   GEN: no apparent distress, comfortable; AAOx3  HEAD: atraumatic and normocephalic  EYES: no pallor, no icterus, PERRLA  ENT: OMM, no pharyngeal erythema, external ears WNL; no nasal discharge; " no thrush; s/p laryngectomy with flap since healed well; trach   NECK: no masses, thyroid normal, trachea midline, no LAD/LN's, supple; post-op dissection healed well;    CV: RRR with no murmur; normal pulse; normal S1 and S2; no pedal edema; portacath   CHEST: Normal respiratory effort; CTAB; normal breath sounds; no wheeze or crackles  ABDOM: nontender and nondistended; soft; normal bowel sounds; no rebound/guarding; peg tube  MUSC/Skeletal: ROM normal; no crepitus; joints normal; no deformities or arthropathy  EXTREM: no clubbing, cyanosis, inflammation or swelling  SKIN: no rashes, lesions, ulcers, petechiae or subcutaneous nodules  : no mahan  NEURO: grossly intact; motor/sensory WNL; AAOx3; no tremors  PSYCH: normal mood, affect and behavior  LYMPH: normal cervical, supraclavicular, axillary and groin LN's          Labs:     Lab Results   Component Value Date    WBC 4.83 08/10/2023    HGB 11.6 (L) 08/10/2023    HCT 35.3 (L) 08/10/2023    MCV 95 08/10/2023     08/10/2023     CMP  Sodium   Date Value Ref Range Status   08/10/2023 140 136 - 145 mmol/L Final     Potassium   Date Value Ref Range Status   08/10/2023 4.1 3.5 - 5.1 mmol/L Final     Chloride   Date Value Ref Range Status   08/10/2023 102 95 - 110 mmol/L Final     CO2   Date Value Ref Range Status   08/10/2023 27 23 - 29 mmol/L Final     Glucose   Date Value Ref Range Status   08/10/2023 139 (H) 70 - 110 mg/dL Final     BUN   Date Value Ref Range Status   08/10/2023 23 8 - 23 mg/dL Final     Creatinine   Date Value Ref Range Status   08/10/2023 1.1 0.5 - 1.4 mg/dL Final     Calcium   Date Value Ref Range Status   08/10/2023 7.8 (L) 8.7 - 10.5 mg/dL Final     Total Protein   Date Value Ref Range Status   08/10/2023 7.4 6.0 - 8.4 g/dL Final     Albumin   Date Value Ref Range Status   08/10/2023 4.2 3.5 - 5.2 g/dL Final   11/18/2020 3.7 3.6 - 5.1 g/dL Final     Comment:     For additional information, please refer to    http://education.Phok[employer]/faq/ZXZ890 (This link is   being provided for informational/ educational purposes only.)  This test was developed and its analytical performance   characteristics have been determined by [employer]  [facility]. It has not been cleared or approved by the   [place] Food and Drug Administration. This assay has been validated   pursuant to the CLIA regulations and is used for clinical   purposes.  @ Test Performed By:  [employer] [facility]  Yo Cortes M.D.,   [address] [place] [id number]  [facility]  [id number]       Total Bilirubin   Date Value Ref Range Status   08/10/2023 1.1 (H) 0.1 - 1.0 mg/dL Final     Comment:     For infants and newborns, interpretation of results should be based  on gestational age, weight and in agreement with clinical  observations.    Premature Infant recommended reference ranges:  Up to 24 hours.............<8.0 mg/dL  Up to 48 hours............<12.0 mg/dL  3-5 days..................<15.0 mg/dL  6-29 days.................<15.0 mg/dL       Alkaline Phosphatase   Date Value Ref Range Status   08/10/2023 214 (H) 55 - 135 U/L Final     AST   Date Value Ref Range Status   08/10/2023 39 10 - 40 U/L Final     ALT   Date Value Ref Range Status   08/10/2023 38 10 - 44 U/L Final     Anion Gap   Date Value Ref Range Status   08/10/2023 11 8 - 16 mmol/L Final     eGFR if    Date Value Ref Range Status   07/29/2022 >60.0 >60 mL/min/1.73 m^2 Final     eGFR if non    Date Value Ref Range Status   07/29/2022 >60.0 >60 mL/min/1.73 m^2 Final     Comment:     Calculation used to obtain the estimated glomerular filtration  rate (eGFR) is the CKD-EPI equation.          Lab Results   Component Value Date    IRON 30 (L) 11/25/2022    TRANSFERRIN 114 (L) 11/25/2022    TIBC 169 (L) 11/25/2022    FESATURATED 18 (L) 11/25/2022            Radiology/Diagnostic Studies:      PET  5/30/2023:    IMPRESSION: Postsurgical changes from total glossectomy, laryngectomy and pharyngectomy with bilateral neck dissections     There is resolution of the previously noted fluid collections within the neck. There is mild FDG activity in the left side the neck adjacent to the neoesophagus as well as at the tracheostomy site to the right of the neoesophagus. Findings most likely are secondary to postsurgical and postinfection changes. There is no focal mass or adenopathy     No evidence of distant metastasis             CTA Neck    Result Date: 9/20/2022  EXAMINATION: CTA NECK CLINICAL HISTORY: Head/neck cancer, monitor;Malignant neoplasm of base of tongue TECHNIQUE: CT angiogram was performed from the level of the scott to the EAC following the IV administration of 75mL of Omnipaque 350.   Sagittal and coronal reconstructions and maximum intensity projection reconstructions were performed. Arterial stenosis percentages are based on NASCET measurement criteria. COMPARISON: CTA neck dated 05/20/2022 FINDINGS: Aortic arch and great vessels: There is a conventional left-sided 3 vessel arch.  The aortic arch is widely patent.  There is stable soft and calcified plaque in the proximal left subclavian artery with a similar appearance the prior study and possibly within radiation field but without critical stenosis or occlusion.  The right subclavian artery is patent.  The brachiocephalic trunk and origin of the left common carotid artery are patent. Carotid arteries Right carotid artery: There is calcified plaque scattered in the right common carotid artery without critical stenosis, occlusion or change.  There is no measurable stenosis of the internal carotid artery which is patent to the skull base. Left carotid artery: There is mild plaque scattered in the left common carotid artery without change and without critical stenosis or occlusion.  There is no measurable stenosis of the internal carotid artery.  Vertebral arteries: The left vertebral artery is dominant and patent throughout its course in the neck.  The right vertebral artery is hypoplastic but patent.  There is no critical stenosis, occlusion, thrombus or dissection.  There is no change. The study extends intracranially.  There is calcified plaque in the V4 segment of the left vertebral artery resulting in a moderate to marked short segment nonocclusive stenosis.  The right vertebral artery partially terminates in a posteroinferior cerebellar artery with a short segment moderate to marked stenosis of the very distal right vertebral artery.  Some flow within the distal right vertebral artery may represent retrograde flow from the dominant left vertebral artery.  The basilar artery is patent.  The origins of the superior cerebellar and posterior cerebral artery on the right are patent.  There is fetal origin of the left posterior cerebral artery which is patent. There is plaque in the cavernous segments of both internal carotid arteries but there is no critical stenosis or large vessel occlusion of the anterior circulation vessels.  There is no large aneurysm. Other: There is a similar appearance of the included upper lungs with pleural and parenchymal changes anteriorly in the upper lobes bilaterally.  As previously discussed, timing of the contrast bolus is performed during the arterial phase for CTA neck.  Therefore, enhancement of the soft tissues is somewhat suboptimal due to this technique. There has been a large region of soft tissue resection in the anterior mid and lower neck soft tissues with continued open defect in the upper chest and lower neck above the manubrium.  Soft tissue seen along the left posterolateral border of the trachea could represent secretions or mucus.  This was present previously. Redemonstrated is a large heterogeneously enhancing lesion centered at the level of the tongue base and floor of the mouth centrally into the left.   There is scattered calcifications.  The prior CTA demonstrated regions of central necrosis which are no longer definitely present but diffusely heterogeneous density and enhancement likely represents regions of necrosis.  This large heterogeneously enhancing soft tissue mass extends more anteriorly in the floor of the mouth on the left and measures at least 5.6 cm in greatest anterior to posterior dimension with 3.6 cm transverse dimension.  This does extend anteriorly to the medial margin of the body of the mandible on the left. There are post treatment changes with stranding in the neck fat.     1. Similar appearance of the neck vessels when compared to the prior CTA neck with mild narrowing at the origin of the left subclavian artery.  There is no critical stenosis, occlusion, thrombosis or dissection involving the cervical vertebral or carotid arteries. 2. Larger mass within the hypopharynx and tongue base extending anteriorly to the floor of the mouth on the left to the medial margin of the body of the mandible without obvious bony destruction. 3. There is nonocclusive stenosis of the distal V4 segment of the left vertebral artery without change. 4. There is no large vessel occlusion or critical stenosis of the anterior circulation. 5. There is developmental variation with fetal origin of the left posterior cerebral artery. Electronically signed by: Rex Joyner MD Date:    09/20/2022 Time:    11:31    CT Abdomen Pelvis With Contrast    Result Date: 9/1/2022  CMS MANDATED QUALITY DATA - CT RADIATION - 436 All CT scans at this facility utilize dose modulation, iterative reconstruction, and/or weight based dosing when appropriate to reduce radiation dose to as low as reasonably achievable. Reason: abdominal pain partial history of laryngeal carcinoma TECHNIQUE: CT abdomen and pelvis with 100 mL Omnipaque 350. COMPARISON: PET/CT 5/13/2022 CT ABDOMEN: Coronary artery calcification and extremely tiny hiatal  hernia incidentally noted. Visualized lung bases are clear. Liver, gallbladder, pancreas, spleen, adrenals, and kidneys are normal. Mild aortoiliac calcifications present. Gastrostomy tube tip lies in distal gastric body. Small intestines are unremarkable. Mild wall thickening affects the ascending colon with pneumatosis intestinalis diffusely affecting the ascending colon. No other free intraperitoneal gas or, and remainder of colon is normal. A normal appendix is present. The major mesenteric vascular structures are patent. No acute osseous abnormality. CT PELVIS: Prostate is slightly enlarged. Bladder is normal. No free pelvic fluid. Tiny right and small to moderate left fat-containing inguinal hernias are present. No acute osseous abnormality. IMPRESSION: 1. Pneumatosis intestinalis affecting the ascending colon with associated mild wall thickening. Etiology of this is undetermined. Potential considerations include intestinal ischemia or pneumatosis related to chemotherapy. Clinical and laboratory correlation is needed to assess significance. No portal venous gas or free intraperitoneal air however. 2. Coronary artery calcifications Electronically signed by:  Nael Hui MD  9/1/2022 6:20 PM CDT Workstation: [id number]    NM PET CT Routine Skull to Mid Thigh    Result Date: 9/27/2022  PET CT WITH IMAGE FUSION HISTORY:  restage tongue cancer RESTAGING...  HX: TONGUE CA.  DX: April 2022.  LAST CHEMO TREATMENT WAS 3WKS AGO.  EVALUATE TX RESPONSE.   ALSO HX OF LARYNGEAL CA IN AUGUST 2020.  MULTIPLE SURGERIES: LARYNGECTOMY, THYROIDECTOMY & TRACHEOSTOMY.  RAD TX WAS COMPLETED IN NOV 2020. TECHNIQUE: Following IV administration of 11.2 mCi of F-18 labeled FDG into right antecubital fossa and a 60 minute delay, PET CT was performed from the vertex of the skull through the proximal thighs with an integrated PET CT scanner with image fusion. CT images were obtained to aid in attenuation correction and PET localization.  The patient's serum glucose at the time of the exam was 136 mg/dL. COMPARISON: PET/CT 5/13/2022 FINDINGS: Poorly characterized soft tissue mass involving base of tongue approximately measures 4.3 x 2.8 cm (series 3 image 65) appearing similar to the prior exam. This reaches current max SUV of 11.8, not significantly changed from prior of 11.4. No other abnormal FDG activity. Intracranial compartment is unremarkable. Trace bilateral maxillary sinus mucosal thickening is present. Postoperative changes of laryngectomy and tracheostomy are present. Surgical clips occur throughout the neck. No definite enlarged cervical or supraclavicular lymph node, with evaluation limited by lack of IV contrast. Left subclavian port catheter tip terminates in SVC. Diffuse coronary artery calcifications are present. No enlarged mediastinal or axillary lymph nodes. No pulmonary nodule or mass. Gastrostomy tube tip lies in gastric body. Moderate aortoiliac calcifications are present. Small fat-containing left inguinal hernia incidentally noted. Mild degenerative changes affect the spine. No suspicious osseous abnormality. IMPRESSION: 1. No significant change in the FDG avid mass involving the tongue base, remaining characteristic of malignancy. 2. No new FDG avid malignancy or metastatic disease. Electronically signed by:  Nael Hui MD  9/27/2022 2:38 PM CDT Workstation: [id number]    CT Abdomen Pelvis  Without Contrast    Result Date: 9/4/2022  CMS MANDATED QUALITY DATA - CT RADIATION  436 All CT scans at this facility utilize dose modulation, iterative reconstruction, and/or weight based dosing when appropriate to reduce radiation dose to as low as reasonably achievable. CT ABDOMEN PELVIS WITHOUT IV CONTRAST CLINICAL HISTORY: 72 years Male Follow-up pneumatosis COMPARISON: CT abdomen and pelvis September 1, 2022 FINDINGS: Imaging through the lower thorax demonstrates subsegmental atelectasis of the dependent lower lobes. Coronary  artery calcification. Bone window images show no acute or aggressive osseous abnormality. Transitional lumbosacral vertebral body. On this unenhanced exam, no focal hepatic lesion. Gallbladder and biliary tree are unremarkable. Spleen appears normal. Pancreas is unremarkable. No adrenal lesion. No renal calculi or hydronephrosis. Ureters are normal in caliber. Urinary bladder is within normal limits. Gastrostomy tube is in place within the stomach. Stomach is largely collapsed. No evidence of small bowel obstruction. Pneumatosis and pericolonic abscess involving the ascending colon is redemonstrated, slightly diminished compared to prior. Mild wall thickening throughout the colon. No free fluid or free air within the abdomen or pelvis. Aortoiliac atherosclerotic calcification. No pathologically enlarged lymph nodes within the abdomen or pelvis. Bilateral fat-containing inguinal hernias, larger on the left. IMPRESSION: Pneumatosis and pericolonic gas about the ascending colon has decreased in volume compared to prior. Persistent colonic wall thickening which could indicate colitis. Coronary and aortic atherosclerosis. Gastrostomy tube is within the stomach. Bilateral inguinal hernias. Electronically signed by:  Jose De Jesus Oleary MD  9/4/2022 4:06 PM CDT Workstation: [id number]    CTA neck  5/20/2022:     IMPRESSION:  Persistent mass within the hypopharynx consistent with malignancy.  By my measurements it is larger than on April 24, 2022 with central necrosis.  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 This is similar to the previous April 2022 study.  No evidence of arterial compromise related to malignancy.     PET 5/13/2022:     IMPRESSION:  1. Postoperative changes of prior laryngectomy and lymph node resection/radiation.  2. Masslike FDG avid lesion to the left of midline at the tongue base concerning for residual/recurrent disease.  3. Adjacent  confluent nodular extension along the inferior left side of the mass.  4. No FDG avid lymphadenopathy or convincing additional sites of disease in the chest, abdomen or pelvis.     CT head 5/10/2022:  FINDINGS: Comparison to multiple prior exams. There is no acute intracranial hemorrhage, with no mass effect or abnormal extra-axial fluid. Mild scattered areas of nonspecific hypoattenuation involve the deep periventricular white matter, with gray-white differentiation maintained.     There is mild generalized prominence of the cortical sulci and ventricles. The cerebellum and brainstem are unremarkable. There are carotid siphon vascular calcifications. The visualized paranasal sinuses and mastoid air cells are clear. There is no acute calvarial fracture or scalp hematoma.     IMPRESSION: No acute intracranial hemorrhage or acute calvarial fracture     CT soft neck 4/24/2022;     Oral tongue/tongue base:  At the base of the tongue on the left there are findings of a heterogeneously enhancing mass measuring 2.1 cm highly concerning for a neoplastic process.     True and false cords:  Patient status post laryngectomy which has been performed in the interim. Soft tissue stranding in the lower neck likely related to a previous flat reconstruction. No enhancement or lymphadenopathy within the lower neck.     Lymph node assessment:Negative     Surrounding soft tissues:  Negative     Vasculature:  Negative     Osseous structures:  Negative     Lung apices:  Scarring involving the lung apices bilaterally likely related to previous radiation.     IMPRESSION:  1. Postoperative and radiation changes involving the lower neck.  2. Findings highly concerning for a developing mass at the left base of the tongue enhancing 2.1 cm region. Direct visualization in this region would be of benefit.        CT soft neck  12/24/2021  IMPRESSION:  1.  Laryngeal mass as on prior exam. Laryngeal airway narrowing has not changed.  2.  No evidence  for active hemorrhage.  3.  Partially visualized patchy groundglass infiltrates in both upper lobes. Also see CT chest report     CTA Chest 12/24/2021:  IMPRESSION:     1.  No pulmonary embolism.     2.  Soft tissue stranding in the region of the strap musculature with ill-defined hypodensity measuring 2.8 x 1.4 cm in the cervical midline.  Findings concerning for infectious process or abscess. Neoplastic process could have similar imaging characteristics.     3.  Suggested erosion of the proximal right parasymphyseal aspect of the thyroid shield.  Correlated with CT soft tissue neck findings. Findings could represent osteomyelitis. Neoplastic process cannot be excluded.     4.  Hepatic steatosis.  5.  Thickening of the gastric wall. Prominence of perigastric vasculature. Small hiatal hernia.     6.  Moderate stool burden.  Correlate for constipation.        PET 12/15/2021:  IMPRESSION:     Substantial qualitative and quantitative increase of hypermetabolic FDG activity associated with the larynx in this patient with known supraglottic neoplasm.     No evidence of FDG avid metastatic disease involving neck, chest, abdomen or pelvis.     PET 8/21/2020:     Impression:     1. Intensely hypermetabolic plaque-like mass along the left true vocal cord, compatible with known laryngeal carcinoma.  2. No findings of regional metastatic disease in the neck, or distant metastatic disease.        CT Chest 8/10/2020:     IMPRESSION:     No metastatic disease within the chest.  Three-vessel coronary artery calcification. Nondilated cardiac  chambers     CT Soft neck 7/22/2020:  IMPRESSION:  1. Slight interval increase in size of the previously described  enhancing nodule along the anterior left vocal cord.  2. No pathologic lymphadenopathy.  3. Additional and incidental findings as noted above.     CT Soft neck  3/16/2020:     Impression:     6 mm enhancing focus involving the superior aspect of the left focal cord near the  anterior commisure.  Recommend direct visualization for further evaluation of laryngeal polyp     Question of 2 mm submucosal lipoma involving the midportion of the superior aspect of the left vocal cord.     Mild arteriosclerosis involving the brachiocephalic arteries and carotid arteries     Mild degenerative change of the cervical spine        I have reviewed all available lab results and radiology reports.    Assessment/Plan:   (1) 73 y.o. 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     - Patient has been under the care of Dr Khoobehi with [facility] and Dr Portillo with rad/onc.   - He was recently found to have a new primary cancer involving the base of his tongue in April 2022. He was deemed not to be a candidate for any further radiation and did not want to undergo any further surgery as this would necessitate a glossectomy procedure.   - He has been on adjuvant chemotherapy per direction of Dr Khoobehi and has had 3 cycles. His therapy course has been complicated with persistent diarrhea and N/V.      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 He saw Dr Lucero with Rad/onc this am. They are going to present his case to H&N Tumor Board at [facility] [place] and plans to see Dr James about surgical options. If he is not a surgical candidate then will proceed with cisplatin and XRT combine therapy.     10/6/2022:  - He saw Dr Lucero with Rad/onc, and Dr James and his case was presented to H&N  Tumor Board at [facility] and  he general consensus was to proceed to surgery.  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   - He had his portacath removed on 11/28.   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, keratinizing type, moderate to poorly differentiated tumor  - Four LN's were all negative  - he did have a positive left superior pharyngeal margin  - pT3 pN0  - reviewed the latest NCCN guidelines again from Version 1.2023; he may need some further systemic therapy due to the margin  - check on Rad/onc follow-up     1/23/2023:  - s/p new portacath placed on 1/12/2023 with Dr Le  - starting combined chemotherapy and XRT - cisplatin  - s/p chemotherapy school with [facility]    2/6/2023:  - he seems to be tolerating the chemotherapy well at this time  - continued with concomitant therapy with XRT    2/22/2023:  - he seems to be tolerating the chemotherapy well at this time  - continued with concomitant therapy with XRT  - labs relatively adequate  - will check on his refills    3/6/2023:  - finishing up XRT this comking Thursday  - last chemotherapy today    4/3/2023:  - He has since completed chemotherapy and  XRT.  He seems to have tolerated the regimen fairly well with no new complaints.  Some weight loss but reports he is staying hydrated. He does have some occasional GRIFFIN.   - He is seeing ENT tomorrow with Dr James    5/31/2023:  - He has since completed chemotherapy and  XRT.  He seems to have tolerated the regimen fairly well . He  saw Dr James with ENT on 5/2/2023 and had repeat scope with good report per patient.  - He had recent PET yesterday on 5/30 which overall looks adequate    8/23/2023:  - sees Dr James again on 9/5/2023  - will set up repeat CT neck and chest for Sept 2023  - reach out to dietary about foods he can eat that are higher in iron  - consider IV iron x2 (he can not swallow pills)  - see if we can renew PT         (2) Hx/of Laryngeal cancer in Aug 2020 stage II (cT2 N0)  - s/p radiation followed by bilateral neck dissection and total thyroidectomy     Brief Oncology Summary for his laryngeal CA hx:     Patient was noted to initially have a suspicious lesion on the left true vocal cord by laryngoscopy with Dr Xiao in March 2020 with biopsy showing severe dysplastic changes without definitive invasive process. He had a CT scan on 3/16/2020 which showed a 6mm nodule on the left vocal cord. A repeat Ct scan four months later on 7/22/2020 showed the nodule enlarged to 1.4 x 1.1 cm in size. Patient was then seen by Dr Noel and underwent repeat scope in Aug 2020 who found a bulky deeply invading tumor which was debulked and the pathology coming back moderately differentiated SCCA without lymphovascular or perineural invasion. Hs case was subsequently presented to the tumor board and the consensus was to proceed with radiation. He completed XRT on 10/30/2020 and proceeded with regular laryngoscopy surveillance. He eventually required salvage laryngectomy and neck dissection with flap with Dr James on Jan 6th 2022. 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 Fifty lymph nodes were removed which were all negative. Pathology TNM was pT4a, pN0. Patient did not require any adjuvant therapy afterwards.      (2) HTN and hypercholesterolemia     (3) Paroxysmal atrial fibrillation, V tach     (4) DM II      (5) B12 deficiency      (6) Fatty liver disease, GERD           VISIT DIAGNOSES:      Laryngeal cancer    Larynx cancer    Iron deficiency anemia due to chronic blood loss    Vitamin B12 deficiency    Malignant neoplasm of base of tongue    S/P laryngectomy    Tracheostomy in place    Abnormal CT scan, neck          PLAN:  Proceed with ENT and rad/onc f/u as directed by them respectively - sees Dr James on 9/5  See if we can renew PT  Ask dietary to evaulate for calcium and iron rish foods  Set up IV iron x 2   s/p chemotherapy school with Briana - discussed the side-effect profile, provided literature and obtained consents  F/u Rad/onc follow-up as directed  Check labs monthly for now  F/u with PCP, ENT, etc  Dietician f/u on the tube feeds     RTC in 12 weeks with myself  Fax note to  Bandar/Dameon Lucero Hasney, Yesenia Gomez       Discussion:     COVID-19 Discussion:     I had long discussion with patient and any applicable family about the COVID-19 coronavirus epidemic and the recommended precautions with regard to cancer and/or hematology patients. I have re-iterated the CDC recommendations for adequate hand washing, use of hand -like products, and coughing into elbow, etc. In addition, especially for our patients who are on chemotherapy and/or our otherwise immunocompromised patients, I have recommended avoidance of crowds, including movie theaters, restaurants, churches, etc. I have recommended avoidance of any sick or symptomatic family members and/or friends. I have also recommended avoidance of any raw and unwashed food products, and general avoidance of food items that have not been prepared by themselves. The patient has been asked to call us immediately with any symptom developments, issues, questions or other general concerns.         Pathology Discussion:     I reviewed and discussed the pathology report(s) and radiograph reports (if available) in as simple to understand and/or  "laymen's terms to the best of my ability. I had an indepth conversation with the patient and went over the patient's individual diagnosis based on the information that was currently available. I discussed the TNM staging process with regard to the patient's particular cancer type, and the calculated stage based on the currently available TNM data and literature. I discussed the available prognostic data with regard to the current staging information and how it relates to the prognosis of their particular neoplastic process.          NCCN Guidelines:     I discussed the available treatment option(s) in accordance with the latest literature from the NCCN Clinical Practice Guidelines for the patient's particular type of cancer disorder. The NCCN Guidelines provide a "document evidence-based (and) consensus-driven management" of the care of oncology patients. The treatment recommendations were made not only in accordance to the NCCN guidelines, but also factored in to account the patient's overall age, condition, performance status and their medical co-morbidities. I went over the risks and benefits of the the treatment options (if any could be made) with regard to their particular cancer type, their cancer stage, their age, and their co-morbidities.         Chemotherapy Discussion:      I discussed the available treatment option(s) in accordance with the latest/current national evidence-based guidelines (NCCN, UpToDate, NCI, ASCO, etc where applicable), their overall age/condition and their co-morbidities. I also went over the risks and benefits of the chemotherapy with regard to their particular cancer type, their cancer stage, their age/condition, and their co-morbidities. I provided literature on the chemotherapy regimen and discussed the chemotherapy side-effect profiles of the drug(s). I discussed the importance of compliance with obtaining and monitoring weekly lab work, and went over the potential " hematopathology issues and risks with anemia, leucopenia and thrombocytopenia that can occur with chemotherapy. I discussed the potential risks of liver and kidney damage, which could be permanent and could necessitate dialysis long-term if kidney failure developed. I discussed the emetic and/or diarrheal potential of the regimen and the potential need for use of antiemetic and anti-diarrheal medications. I discussed the risk for development of anaphylactic shock, bronchospasm, dysrhythmia, and respiratory/cardiovascular arrest and/or failure. I discussed the potential risks for development of alopecia, cold sensory issues, ringing in ears, vertigo, cataracts, glaucoma, and neuropathy, all of which could end up being chronic and life-long. Some chemotherpyI discussed the risks of hand-foot syndrome and rashes, and development of other autoimmune mediated processes such as pneumonitis, hepatitis, and colitis which could be life threatening. I discussed the risks of the potential development of a rare but fatal viral mediated disease known as PML (Progressive Multifocal Leukoencephalopathy), and risk of future development of leukemia and/or lymphoma from use of certain chemotherapy agents. I discussed the need for neutropenic precautions, basic hygiene/sanitation behaviors and dietary restrictions.    The patient's consent has been obtained to proceed with the chemotherapy.The patient will be referred to Chemotherapy School /[facility] for training and education on chemotherapy, use of antiemetics and/or anti-diarrheals, use of NSAID's, potential chemotherapy side-effects, and any specific recommendations and precautions with the particular chemotherapy agents.      I answered all of the patient's (and family's, if applicable) questions to the best of my ability and to their complete satisfaction. The patient acknowledged full understanding of the risks, recommendations and plan(s).     I have explained and  the patient understands all of  the current recommendation(s). I have answered all of their questions to the best of my ability and to their complete satisfaction.         I spent over 25 mins of time with the patient. Reviewed results of the recently ordered labs, tests and studies; made directives with regards to the results. Over half of this time was spent couseling and coordinating care.    I have explained all of the above in detail and the patient understands all of the current recommendation(s). I have answered all of their questions to the best of my ability and to their complete satisfaction.   The patient is to continue with the current management plan.            Electronically signed by Venu Tamez MD                                 Answers submitted by the patient for this visit:  Review of Systems Questionnaire (Submitted on 8/20/2023)  appetite change : No  unexpected weight change: No  mouth sores: No  visual disturbance: Yes  cough: No  shortness of breath: No  chest pain: No  abdominal pain: No  diarrhea: No  frequency: Yes  back pain: No  rash: No  headaches: No  adenopathy: No  nervous/ anxious: No

## 2023-08-23 ENCOUNTER — TELEPHONE (OUTPATIENT)
Dept: HEMATOLOGY/ONCOLOGY | Facility: CLINIC | Age: 73
End: 2023-08-23

## 2023-08-23 ENCOUNTER — INFUSION (OUTPATIENT)
Dept: INFUSION THERAPY | Facility: HOSPITAL | Age: 73
End: 2023-08-23
Attending: NURSE PRACTITIONER
Payer: MEDICARE

## 2023-08-23 ENCOUNTER — OFFICE VISIT (OUTPATIENT)
Dept: HEMATOLOGY/ONCOLOGY | Facility: CLINIC | Age: 73
End: 2023-08-23
Payer: MEDICARE

## 2023-08-23 VITALS
OXYGEN SATURATION: 100 % | RESPIRATION RATE: 16 BRPM | HEIGHT: 66 IN | HEIGHT: 66 IN | HEART RATE: 69 BPM | WEIGHT: 138.31 LBS | TEMPERATURE: 98 F | BODY MASS INDEX: 22.16 KG/M2 | TEMPERATURE: 98 F | HEART RATE: 66 BPM | SYSTOLIC BLOOD PRESSURE: 102 MMHG | RESPIRATION RATE: 16 BRPM | BODY MASS INDEX: 22.23 KG/M2 | DIASTOLIC BLOOD PRESSURE: 65 MMHG | DIASTOLIC BLOOD PRESSURE: 57 MMHG | SYSTOLIC BLOOD PRESSURE: 95 MMHG | WEIGHT: 137.88 LBS

## 2023-08-23 VITALS
BODY MASS INDEX: 22.5 KG/M2 | DIASTOLIC BLOOD PRESSURE: 60 MMHG | WEIGHT: 140 LBS | HEIGHT: 66 IN | HEART RATE: 69 BPM | OXYGEN SATURATION: 100 % | TEMPERATURE: 98 F | SYSTOLIC BLOOD PRESSURE: 110 MMHG

## 2023-08-23 DIAGNOSIS — C32.9 LARYNGEAL CANCER: ICD-10-CM

## 2023-08-23 DIAGNOSIS — G62.0 CHEMOTHERAPY-INDUCED PERIPHERAL NEUROPATHY: ICD-10-CM

## 2023-08-23 DIAGNOSIS — C01 MALIGNANT NEOPLASM OF BASE OF TONGUE: ICD-10-CM

## 2023-08-23 DIAGNOSIS — M54.50 CHRONIC LOW BACK PAIN, UNSPECIFIED BACK PAIN LATERALITY, UNSPECIFIED WHETHER SCIATICA PRESENT: Primary | ICD-10-CM

## 2023-08-23 DIAGNOSIS — T45.1X5A CHEMOTHERAPY-INDUCED PERIPHERAL NEUROPATHY: ICD-10-CM

## 2023-08-23 DIAGNOSIS — M25.512 ACUTE PAIN OF BOTH SHOULDERS: ICD-10-CM

## 2023-08-23 DIAGNOSIS — R93.89 ABNORMAL CT SCAN, NECK: ICD-10-CM

## 2023-08-23 DIAGNOSIS — G54.8 PHANTOM PAIN: ICD-10-CM

## 2023-08-23 DIAGNOSIS — D50.0 IRON DEFICIENCY ANEMIA DUE TO CHRONIC BLOOD LOSS: ICD-10-CM

## 2023-08-23 DIAGNOSIS — Z93.0 TRACHEOSTOMY IN PLACE: ICD-10-CM

## 2023-08-23 DIAGNOSIS — G89.29 CHRONIC LOW BACK PAIN, UNSPECIFIED BACK PAIN LATERALITY, UNSPECIFIED WHETHER SCIATICA PRESENT: Primary | ICD-10-CM

## 2023-08-23 DIAGNOSIS — E53.8 VITAMIN B12 DEFICIENCY: ICD-10-CM

## 2023-08-23 DIAGNOSIS — C32.9 LARYNX CANCER: ICD-10-CM

## 2023-08-23 DIAGNOSIS — D50.9 IRON DEFICIENCY ANEMIA, UNSPECIFIED IRON DEFICIENCY ANEMIA TYPE: ICD-10-CM

## 2023-08-23 DIAGNOSIS — C32.9 LARYNGEAL CANCER: Primary | ICD-10-CM

## 2023-08-23 DIAGNOSIS — C01 MALIGNANT NEOPLASM OF BASE OF TONGUE: Primary | ICD-10-CM

## 2023-08-23 DIAGNOSIS — E86.0 DEHYDRATION: ICD-10-CM

## 2023-08-23 DIAGNOSIS — Z90.02 S/P LARYNGECTOMY: ICD-10-CM

## 2023-08-23 DIAGNOSIS — M25.511 ACUTE PAIN OF BOTH SHOULDERS: ICD-10-CM

## 2023-08-23 DIAGNOSIS — R52 PHANTOM PAIN: ICD-10-CM

## 2023-08-23 PROBLEM — G54.6 PHANTOM PAIN: Status: ACTIVE | Noted: 2023-08-23

## 2023-08-23 PROCEDURE — 3074F SYST BP LT 130 MM HG: CPT | Mod: CPTII,S$GLB,, | Performed by: NURSE PRACTITIONER

## 2023-08-23 PROCEDURE — 3008F PR BODY MASS INDEX (BMI) DOCUMENTED: ICD-10-PCS | Mod: CPTII,S$GLB,, | Performed by: INTERNAL MEDICINE

## 2023-08-23 PROCEDURE — 1159F PR MEDICATION LIST DOCUMENTED IN MEDICAL RECORD: ICD-10-PCS | Mod: CPTII,S$GLB,, | Performed by: INTERNAL MEDICINE

## 2023-08-23 PROCEDURE — 99215 PR OFFICE/OUTPT VISIT, EST, LEVL V, 40-54 MIN: ICD-10-PCS | Mod: S$GLB,,, | Performed by: NURSE PRACTITIONER

## 2023-08-23 PROCEDURE — A4216 STERILE WATER/SALINE, 10 ML: HCPCS | Performed by: INTERNAL MEDICINE

## 2023-08-23 PROCEDURE — 3044F PR MOST RECENT HEMOGLOBIN A1C LEVEL <7.0%: ICD-10-PCS | Mod: CPTII,S$GLB,, | Performed by: INTERNAL MEDICINE

## 2023-08-23 PROCEDURE — 3008F PR BODY MASS INDEX (BMI) DOCUMENTED: ICD-10-PCS | Mod: CPTII,S$GLB,, | Performed by: NURSE PRACTITIONER

## 2023-08-23 PROCEDURE — 99214 PR OFFICE/OUTPT VISIT, EST, LEVL IV, 30-39 MIN: ICD-10-PCS | Mod: S$GLB,,, | Performed by: INTERNAL MEDICINE

## 2023-08-23 PROCEDURE — 3074F PR MOST RECENT SYSTOLIC BLOOD PRESSURE < 130 MM HG: ICD-10-PCS | Mod: CPTII,S$GLB,, | Performed by: INTERNAL MEDICINE

## 2023-08-23 PROCEDURE — 1160F RVW MEDS BY RX/DR IN RCRD: CPT | Mod: CPTII,S$GLB,, | Performed by: INTERNAL MEDICINE

## 2023-08-23 PROCEDURE — 3078F PR MOST RECENT DIASTOLIC BLOOD PRESSURE < 80 MM HG: ICD-10-PCS | Mod: CPTII,S$GLB,, | Performed by: NURSE PRACTITIONER

## 2023-08-23 PROCEDURE — 1101F PR PT FALLS ASSESS DOC 0-1 FALLS W/OUT INJ PAST YR: ICD-10-PCS | Mod: CPTII,S$GLB,, | Performed by: NURSE PRACTITIONER

## 2023-08-23 PROCEDURE — 3061F PR NEG MICROALBUMINURIA RESULT DOCUMENTED/REVIEW: ICD-10-PCS | Mod: CPTII,S$GLB,, | Performed by: INTERNAL MEDICINE

## 2023-08-23 PROCEDURE — 3288F FALL RISK ASSESSMENT DOCD: CPT | Mod: CPTII,S$GLB,, | Performed by: NURSE PRACTITIONER

## 2023-08-23 PROCEDURE — 3066F PR DOCUMENTATION OF TREATMENT FOR NEPHROPATHY: ICD-10-PCS | Mod: CPTII,S$GLB,, | Performed by: NURSE PRACTITIONER

## 2023-08-23 PROCEDURE — 1126F AMNT PAIN NOTED NONE PRSNT: CPT | Mod: CPTII,S$GLB,, | Performed by: INTERNAL MEDICINE

## 2023-08-23 PROCEDURE — 3288F PR FALLS RISK ASSESSMENT DOCUMENTED: ICD-10-PCS | Mod: CPTII,S$GLB,, | Performed by: NURSE PRACTITIONER

## 2023-08-23 PROCEDURE — 3061F NEG MICROALBUMINURIA REV: CPT | Mod: CPTII,S$GLB,, | Performed by: INTERNAL MEDICINE

## 2023-08-23 PROCEDURE — 1126F AMNT PAIN NOTED NONE PRSNT: CPT | Mod: CPTII,S$GLB,, | Performed by: NURSE PRACTITIONER

## 2023-08-23 PROCEDURE — 3061F PR NEG MICROALBUMINURIA RESULT DOCUMENTED/REVIEW: ICD-10-PCS | Mod: CPTII,S$GLB,, | Performed by: NURSE PRACTITIONER

## 2023-08-23 PROCEDURE — 3066F NEPHROPATHY DOC TX: CPT | Mod: CPTII,S$GLB,, | Performed by: INTERNAL MEDICINE

## 2023-08-23 PROCEDURE — 3078F DIAST BP <80 MM HG: CPT | Mod: CPTII,S$GLB,, | Performed by: NURSE PRACTITIONER

## 2023-08-23 PROCEDURE — 99215 OFFICE O/P EST HI 40 MIN: CPT | Mod: S$GLB,,, | Performed by: NURSE PRACTITIONER

## 2023-08-23 PROCEDURE — 99214 OFFICE O/P EST MOD 30 MIN: CPT | Mod: S$GLB,,, | Performed by: INTERNAL MEDICINE

## 2023-08-23 PROCEDURE — 1160F PR REVIEW ALL MEDS BY PRESCRIBER/CLIN PHARMACIST DOCUMENTED: ICD-10-PCS | Mod: CPTII,S$GLB,, | Performed by: NURSE PRACTITIONER

## 2023-08-23 PROCEDURE — 3061F NEG MICROALBUMINURIA REV: CPT | Mod: CPTII,S$GLB,, | Performed by: NURSE PRACTITIONER

## 2023-08-23 PROCEDURE — 1160F PR REVIEW ALL MEDS BY PRESCRIBER/CLIN PHARMACIST DOCUMENTED: ICD-10-PCS | Mod: CPTII,S$GLB,, | Performed by: INTERNAL MEDICINE

## 2023-08-23 PROCEDURE — 1101F PT FALLS ASSESS-DOCD LE1/YR: CPT | Mod: CPTII,S$GLB,, | Performed by: NURSE PRACTITIONER

## 2023-08-23 PROCEDURE — 3008F BODY MASS INDEX DOCD: CPT | Mod: CPTII,S$GLB,, | Performed by: INTERNAL MEDICINE

## 2023-08-23 PROCEDURE — 1159F PR MEDICATION LIST DOCUMENTED IN MEDICAL RECORD: ICD-10-PCS | Mod: CPTII,S$GLB,, | Performed by: NURSE PRACTITIONER

## 2023-08-23 PROCEDURE — 3074F PR MOST RECENT SYSTOLIC BLOOD PRESSURE < 130 MM HG: ICD-10-PCS | Mod: CPTII,S$GLB,, | Performed by: NURSE PRACTITIONER

## 2023-08-23 PROCEDURE — 1101F PT FALLS ASSESS-DOCD LE1/YR: CPT | Mod: CPTII,S$GLB,, | Performed by: INTERNAL MEDICINE

## 2023-08-23 PROCEDURE — 3078F PR MOST RECENT DIASTOLIC BLOOD PRESSURE < 80 MM HG: ICD-10-PCS | Mod: CPTII,S$GLB,, | Performed by: INTERNAL MEDICINE

## 2023-08-23 PROCEDURE — 3044F HG A1C LEVEL LT 7.0%: CPT | Mod: CPTII,S$GLB,, | Performed by: NURSE PRACTITIONER

## 2023-08-23 PROCEDURE — 1159F MED LIST DOCD IN RCRD: CPT | Mod: CPTII,S$GLB,, | Performed by: NURSE PRACTITIONER

## 2023-08-23 PROCEDURE — 96523 IRRIG DRUG DELIVERY DEVICE: CPT

## 2023-08-23 PROCEDURE — 1126F PR PAIN SEVERITY QUANTIFIED, NO PAIN PRESENT: ICD-10-PCS | Mod: CPTII,S$GLB,, | Performed by: INTERNAL MEDICINE

## 2023-08-23 PROCEDURE — 1159F MED LIST DOCD IN RCRD: CPT | Mod: CPTII,S$GLB,, | Performed by: INTERNAL MEDICINE

## 2023-08-23 PROCEDURE — 3288F PR FALLS RISK ASSESSMENT DOCUMENTED: ICD-10-PCS | Mod: CPTII,S$GLB,, | Performed by: INTERNAL MEDICINE

## 2023-08-23 PROCEDURE — 3078F DIAST BP <80 MM HG: CPT | Mod: CPTII,S$GLB,, | Performed by: INTERNAL MEDICINE

## 2023-08-23 PROCEDURE — 3288F FALL RISK ASSESSMENT DOCD: CPT | Mod: CPTII,S$GLB,, | Performed by: INTERNAL MEDICINE

## 2023-08-23 PROCEDURE — 99999 PR PBB SHADOW E&M-EST. PATIENT-LVL IV: CPT | Mod: PBBFAC,,, | Performed by: NURSE PRACTITIONER

## 2023-08-23 PROCEDURE — 3074F SYST BP LT 130 MM HG: CPT | Mod: CPTII,S$GLB,, | Performed by: INTERNAL MEDICINE

## 2023-08-23 PROCEDURE — 3044F HG A1C LEVEL LT 7.0%: CPT | Mod: CPTII,S$GLB,, | Performed by: INTERNAL MEDICINE

## 2023-08-23 PROCEDURE — 3044F PR MOST RECENT HEMOGLOBIN A1C LEVEL <7.0%: ICD-10-PCS | Mod: CPTII,S$GLB,, | Performed by: NURSE PRACTITIONER

## 2023-08-23 PROCEDURE — 99999 PR PBB SHADOW E&M-EST. PATIENT-LVL IV: ICD-10-PCS | Mod: PBBFAC,,, | Performed by: NURSE PRACTITIONER

## 2023-08-23 PROCEDURE — 3008F BODY MASS INDEX DOCD: CPT | Mod: CPTII,S$GLB,, | Performed by: NURSE PRACTITIONER

## 2023-08-23 PROCEDURE — 3066F PR DOCUMENTATION OF TREATMENT FOR NEPHROPATHY: ICD-10-PCS | Mod: CPTII,S$GLB,, | Performed by: INTERNAL MEDICINE

## 2023-08-23 PROCEDURE — 1160F RVW MEDS BY RX/DR IN RCRD: CPT | Mod: CPTII,S$GLB,, | Performed by: NURSE PRACTITIONER

## 2023-08-23 PROCEDURE — 63600175 PHARM REV CODE 636 W HCPCS: Performed by: INTERNAL MEDICINE

## 2023-08-23 PROCEDURE — 1126F PR PAIN SEVERITY QUANTIFIED, NO PAIN PRESENT: ICD-10-PCS | Mod: CPTII,S$GLB,, | Performed by: NURSE PRACTITIONER

## 2023-08-23 PROCEDURE — 3066F NEPHROPATHY DOC TX: CPT | Mod: CPTII,S$GLB,, | Performed by: NURSE PRACTITIONER

## 2023-08-23 PROCEDURE — 25000003 PHARM REV CODE 250: Performed by: INTERNAL MEDICINE

## 2023-08-23 PROCEDURE — 1101F PR PT FALLS ASSESS DOC 0-1 FALLS W/OUT INJ PAST YR: ICD-10-PCS | Mod: CPTII,S$GLB,, | Performed by: INTERNAL MEDICINE

## 2023-08-23 RX ORDER — SODIUM CHLORIDE 0.9 % (FLUSH) 0.9 %
10 SYRINGE (ML) INJECTION
Status: DISCONTINUED | OUTPATIENT
Start: 2023-08-23 | End: 2023-08-23 | Stop reason: HOSPADM

## 2023-08-23 RX ORDER — HEPARIN 100 UNIT/ML
500 SYRINGE INTRAVENOUS
Status: CANCELLED | OUTPATIENT
Start: 2023-08-23

## 2023-08-23 RX ORDER — DIPHENHYDRAMINE HYDROCHLORIDE 50 MG/ML
25 INJECTION INTRAMUSCULAR; INTRAVENOUS
Status: CANCELLED
Start: 2023-08-23

## 2023-08-23 RX ORDER — HEPARIN 100 UNIT/ML
500 SYRINGE INTRAVENOUS
Status: DISCONTINUED | OUTPATIENT
Start: 2023-08-23 | End: 2023-08-23 | Stop reason: HOSPADM

## 2023-08-23 RX ORDER — SODIUM CHLORIDE 0.9 % (FLUSH) 0.9 %
10 SYRINGE (ML) INJECTION
Status: CANCELLED | OUTPATIENT
Start: 2023-08-23

## 2023-08-23 RX ORDER — EPINEPHRINE 0.3 MG/.3ML
0.3 INJECTION SUBCUTANEOUS ONCE AS NEEDED
Status: CANCELLED | OUTPATIENT
Start: 2023-08-23

## 2023-08-23 RX ORDER — DIPHENHYDRAMINE HYDROCHLORIDE 50 MG/ML
50 INJECTION INTRAMUSCULAR; INTRAVENOUS ONCE AS NEEDED
Status: CANCELLED | OUTPATIENT
Start: 2023-08-23

## 2023-08-23 RX ADMIN — SODIUM CHLORIDE, PRESERVATIVE FREE 10 ML: 5 INJECTION INTRAVENOUS at 10:08

## 2023-08-23 RX ADMIN — HEPARIN 500 UNITS: 100 SYRINGE at 10:08

## 2023-08-23 NOTE — PROGRESS NOTES
Cyrus Batres Jr.  73 y.o. is here to seek an integrative approach to discuss side effects related to laryngeal and lung cancer treatment. Cyrus Batres Jr.  was referred by Dr. Torin GASTELUM  Mr. Monzon was diagnosed with head and neck cancer in 2020 and has been treated with chemo, radiation, and surgery.   He reports he sleeps well and gets 6-8 hours a night and denies fatigue. He reports he has done well through diagnosis and treatment and does not have any stress. He does not take any food or drink by mouth and has a G tube he uses for bolus feeds through the day. He stays active and works a lot in his yard. He has phantom pain to his tongue and he feels like his tongue is burning. He also feels like he is moving his tongue. He reports his neck is tight and he feels some swelling under his neck. He recently finished PT and lymphedema therapy and he continues to do the exercises at home. He has neuropathy to his hands and describes it as tingling, no numbness. He reports low back pain that occurs mostly when he sits for long periods, stretching helps decrease the pain.   He has been  for 6 years and has 2 children. He has a good support system in his friends and family. He enjoys playing his guitar. He is retired from [employer]  after 40 years.       Pillars Assessment    Sleep  How many hours of sleep per night? 6-8 hours  Do you have trouble falling asleep, staying asleep or waking up earlier than you need to? no  Do you have daytime fatigue? no  Do you need medication for sleep? no  Do you use any supplements or other interventions for sleep? no    Resilience  Rate your current level of stress- low    Spirituality-  Believes in God    Nutrition   Food allergies or sensitivities: no  Do you adhere to a particular type of diet? no  Do you have any concerns with your eating habits? no    Exercise  How would you describe your physical activity level? 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    PEG (percutaneous endoscopic gastrostomy) adjustment/replacement/removal 11/22/2022    Postoperative 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    Tongue cancer     Tracheostomy dependence     Type 2 diabetes mellitus, without long-term current use of insulin 11/22/2022        Past Surgical History   Past Surgical History:   Procedure Laterality Date    CATARACT EXTRACTION W/  INTRAOCULAR LENS IMPLANT Right 8/16/2023    Procedure: CEIOL OD  NPO-peg;  Surgeon: Stoney Munoz MD;  Location: [facility] OR;  Service: Ophthalmology;  Laterality: Right;    DIRECT LARYNGOBRONCHOSCOPY N/A 12/27/2021    Procedure: LARYNGOSCOPY, DIRECT, WITH BRONCHOSCOPY;  Surgeon: Flex Espinosa MD;  Location: [address];  Service: ENT;  Laterality: N/A;    DIRECT LARYNGOSCOPY  11/9/2022    Procedure: LARYNGOSCOPY, DIRECT;  Surgeon: Jesse James MD;  Location: [facility] OR [address] [place] [address];  Service: ENT;;    DISSECTION OF NECK Bilateral 1/6/2022    Procedure: DISSECTION, NECK;  Surgeon: Jesse James MD;  [facility];  Service: ENT;  Laterality: Bilateral;    DISSECTION OF NECK Bilateral 11/9/2022    Procedure: DISSECTION, NECK;  Surgeon: Jesse James MD;  [address];  Service: ENT;  Laterality: Bilateral;    FLAP PROCEDURE Right 1/6/2022    Procedure: CREATION, FREE FLAP;  Surgeon: Alise Hart MD;  [facility];  Service: ENT;  Laterality: Right;  Ischemic start 1351  Ischemic stop 1502    FLAP PROCEDURE Left 11/9/2022    Procedure: CREATION, FREE  FLAP, ALT;  Surgeon: Alise Hart MD;  Location: [facility] OR 2ND FLR;  Service: ENT;  Laterality: Left;    GLOSSECTOMY Bilateral 11/9/2022    Procedure: TOTAL GLOSSECTOMY;  Surgeon: Jesse James MD;  Location: [facility] OR 2ND FLR;  Service: ENT;  Laterality: Bilateral;    INSERTION OF TUNNELED CENTRAL VENOUS CATHETER (CVC) WITH SUBCUTANEOUS PORT N/A 6/9/2022    Procedure: [id number];  Surgeon: Jesus Viera MD;  Location: [facility] OR;  Service: General;  Laterality: N/A;    INSERTION OF TUNNELED CENTRAL VENOUS CATHETER (CVC) WITH SUBCUTANEOUS PORT Right 1/12/2023    Procedure: [id number];  Surgeon: Abdulaziz Le Jr., MD;  Location: [facility] OR;  Service: General;  Laterality: Right;    LARYNGECTOMY N/A 1/6/2022    Procedure: LARYNGECTOMY;  Surgeon: Jesse James MD;  [facility];  Service: ENT;  Laterality: N/A;    LARYNGOSCOPY N/A 8/4/2020    Procedure: Suspension microlaryngoscopy with biopsy, possible KTP laser treatment/excision;  Surgeon: Stew Noel MD;  [facility];  Service: ENT;  Laterality: N/A;  Microscope, telescopes, tower, microinstruments, KTP laser, rep conf# [id number] IC 7/28.    LARYNGOSCOPY N/A 3/16/2021    Procedure: Suspension microlaryngoscopy with excision of lesion, possible CO2 laser;  Surgeon: Stew Noel MD;  [facility];  Service: ENT;  Laterality: N/A;  Microscope, telescopes, tower, microinstruments, CO2 laser, rep conf# [id number] IC 3/4.    LARYNGOSCOPY N/A 4/1/2021    Procedure: Suspension microlaryngoscopy with KTP laser excision of lesion;  Surgeon: Stew Noel MD;  Location: [facility] OR 2ND FLR;  Service: ENT;  Laterality: N/A;  Microscope, telescopes, tower, microinstruments, 70 degree scope, vocal fold , KTP laser, rep conf# [id number] BC    LARYNGOSCOPY N/A 12/9/2021    Procedure: Suspension microlaryngoscopy with biopsy;  Surgeon: Stew Noel MD;  Location: [facility] OR [facility]  FLR;  Service: ENT;  Laterality: N/A;  Microscope, telescopes, tower, microinstruments    LARYNGOSCOPY N/A 1/6/2022    Procedure: LARYNGOSCOPY;  Surgeon: Jesse James MD;  Location: [facility] OR 2ND FLR;  Service: ENT;  Laterality: N/A;    LARYNGOSCOPY N/A 4/27/2022    Procedure: LARYNGOSCOPY WITH BIOPSY;  Surgeon: Jesse James MD;  Location: [facility] OR [facility] FLR;  Service: ENT;  Laterality: N/A;    MICROLARYNGOSCOPY N/A 3/17/2020    Procedure: MICROLARYNGOSCOPY;  Surgeon: Jung Xiao MD;  Location: [facility] OR;  Service: ENT;  Laterality: N/A;  Laser Microlaryngoscopy  NEED TO SCHEDULE LASER from [place] [id number]    PHARYNGECTOMY  11/9/2022    Procedure: TOTAL PHARYNGECTOMY;  Surgeon: Jesse James MD;  Location: [facility] OR [facility] FLR;  Service: ENT;;    REIMPLANTATION OF PARATHYROID TISSUE N/A 1/6/2022    Procedure: REIMPLANTATION, PARATHYROID TISSUE;  Surgeon: Jesse James MD;  Location: [facility] OR [facility] FLR;  Service: ENT;  Laterality: N/A;    SKIN SPLIT GRAFT Right 11/9/2022    Procedure: APPLICATION, GRAFT, SKIN, SPLIT-THICKNESS;  Surgeon: Jesse James MD;  Location: [facility] OR [facility] FLR;  Service: ENT;  Laterality: Right;    THYROIDECTOMY  1/6/2022    Procedure: THYROIDECTOMY;  Surgeon: Jesse James MD;  [facility];  Service: ENT;;    TRACHEOSTOMY N/A 12/27/2021    Procedure: CREATION, TRACHEOSTOMY;  Surgeon: Flex Espinosa MD;  Location: [facility] OR [facility] FLR;  Service: ENT;  Laterality: N/A;      Family History   Family History   Problem Relation Age of Onset    Abnormal EKG Mother     Diabetes Father     Heart disease Father     Hypertension Father       Allergies  Review of patient's allergies indicates:   Allergen Reactions    Lovastatin Itching    Pollen extracts     Lovastatin Rash     Not confirmed but pt skeptical      Current Medications:    Current Outpatient Medications:     acetaminophen (TYLENOL) 325 MG tablet, Take 325 mg by mouth every 6 (six) hours as  needed for Pain., Disp: , Rfl:     calcium carbonate 500 mg/5 mL (1,250 mg/5 mL), Take 20 mls four times daily with meals and nightly., Disp: 2300 mL, Rfl: 12    esomeprazole (NEXIUM) 40 MG capsule, 40 mg before breakfast., Disp: , Rfl:     famotidine (PEPCID) 40 mg/5 mL (8 mg/mL) suspension, Give 2.5 mLs (20 mg total) by Per G Tube route 2 (two) times daily., Disp: 50 mL, Rfl: 11    gabapentin (NEURONTIN) 100 MG capsule, Take 1 capsule (100 mg total) by mouth 3 (three) times daily for 7 days, THEN 2 capsules (200 mg total) 3 (three) times daily for 7 days, THEN 3 capsules (300 mg total) 3 (three) times daily for 14 days., Disp: 189 capsule, Rfl: 0    ibuprofen (ADVIL,MOTRIN) 200 MG tablet, Take 200 mg by mouth every 6 (six) hours as needed for Pain., Disp: , Rfl:     ketorolac 0.5% (ACULAR) 0.5 % Drop, Place 1 drop into the right eye 4 (four) times daily. Start 3 days before surgery., Disp: 5 mL, Rfl: 2    lactose-reduced food with fibr (JEVITY 1.5 TRISHA) 0.06 gram-1.5 kcal/mL Liqd, 6 cartons per day via peg.  Flush 60ml before and after each bolus, Disp: 180 each, Rfl: 11    levothyroxine (SYNTHROID) 100 MCG tablet, 1 tablet (100 mcg total) by Per G Tube route before breakfast., Disp: 30 tablet, Rfl: 11    losartan (COZAAR) 100 MG tablet, Take 0.5 tablets (50 mg total) by mouth once daily., Disp: 45 tablet, Rfl: 3    prednisoLONE acetate (PRED FORTE) 1 % DrpS, Place 1 drop into the right eye 4 (four) times daily. Start after surgery, Disp: 5 mL, Rfl: 2    sodium chloride 1,000 mg TbSO oral tablet, Take 1 tablet (1,000 mg total) by mouth 2 (two) times a day. Please crush the tablets for the PEG tube feeding or see if a capsule form is available., Disp: 100 tablet, Rfl: 2    traZODone (DESYREL) 50 MG tablet, TAKE 1 TABLET BY MOUTH NIGHTLY AS NEEDED FOR INSOMNIA., Disp: 90 tablet, Rfl: 1    zolpidem (AMBIEN) 5 MG Tab, 1 tablet (5 mg total) by Per G Tube route nightly as needed (difficult with sleep)., Disp: 30 tablet,  "Rfl: 0  No current facility-administered medications for this visit.     Review of Systems  Review of Systems   Constitutional: Negative.    HENT: Negative.     Eyes: Negative.    Respiratory: Negative.     Cardiovascular: Negative.    Gastrointestinal: Negative.    Genitourinary: Negative.    Musculoskeletal:  Positive for back pain and joint pain.   Skin: Negative.    Neurological:  Positive for tingling.   Endo/Heme/Allergies: Negative.    Psychiatric/Behavioral: Negative.          Physical Exam      Vitals:    08/23/23 1400   BP: 110/60   Pulse: 69   Temp: 97.8 °F (36.6 °C)   TempSrc: Temporal   SpO2: 100%   Weight: 63.5 kg (140 lb)   Height: 5' 6" (1.676 m)     Body mass index is 22.6 kg/m².  Physical Exam  Vitals reviewed.   Constitutional:       Appearance: Normal appearance.   Neurological:      Mental Status: He is alert.   Psychiatric:         Mood and Affect: Mood normal.         Behavior: Behavior normal.        ASSESSMENT :  1. Chronic low back pain, unspecified back pain laterality, unspecified whether sciatica present    2. Chemotherapy-induced peripheral neuropathy    3. Acute pain of both shoulders    4. Laryngeal cancer    5. Malignant neoplasm of base of tongue    6. Phantom pain       PLAN:  Reviewed all information discussed at today's visit and all questions were answered.    Counseled on healthy lifestyle and behavior modifications   Referral to Acupuncture   I discussed and recommended the following support services:  [employer] and Yoga   Music and Relaxation therapy   Meditation    Follow up with Integrative Services in 6 months    I spent a total of 45 minutes on the day of the visit.This includes face to face time and non-face to face time preparing to see the patient (eg, review of tests), obtaining and/or reviewing separately obtained history, documenting clinical information in the electronic or other health record, independently interpreting results and communicating results to the " patient/family/caregiver, or care coordinator.

## 2023-08-23 NOTE — TELEPHONE ENCOUNTER
----- Message from Rocio Hughes sent at 8/23/2023 12:42 PM CDT -----  Patient was ordered injectafer. Per his healthplan this is non-preferred. Preferred medications are venofer and ferrlecit and they do not require auth.

## 2023-08-23 NOTE — PLAN OF CARE
Problem: Infection  Goal: Absence of Infection Signs and Symptoms  Outcome: Ongoing, Progressing  Intervention: Prevent or Manage Infection  Flowsheets (Taken 8/23/2023 1002)  Infection Management: aseptic technique maintained  Isolation Precautions: precautions maintained

## 2023-08-24 ENCOUNTER — TELEPHONE (OUTPATIENT)
Dept: HEMATOLOGY/ONCOLOGY | Facility: CLINIC | Age: 73
End: 2023-08-24
Payer: MEDICARE

## 2023-08-24 ENCOUNTER — OFFICE VISIT (OUTPATIENT)
Dept: OPHTHALMOLOGY | Facility: CLINIC | Age: 73
End: 2023-08-24
Payer: MEDICARE

## 2023-08-24 DIAGNOSIS — Z98.41 STATUS POST CATARACT SURGERY, RIGHT: Primary | ICD-10-CM

## 2023-08-24 DIAGNOSIS — H25.812 COMBINED FORM OF AGE-RELATED CATARACT, LEFT EYE: ICD-10-CM

## 2023-08-24 PROCEDURE — 3044F HG A1C LEVEL LT 7.0%: CPT | Mod: CPTII,S$GLB,, | Performed by: OPHTHALMOLOGY

## 2023-08-24 PROCEDURE — 99999 PR PBB SHADOW E&M-EST. PATIENT-LVL III: CPT | Mod: PBBFAC,,, | Performed by: OPHTHALMOLOGY

## 2023-08-24 PROCEDURE — 1159F MED LIST DOCD IN RCRD: CPT | Mod: CPTII,S$GLB,, | Performed by: OPHTHALMOLOGY

## 2023-08-24 PROCEDURE — 99024 POSTOP FOLLOW-UP VISIT: CPT | Mod: S$GLB,,, | Performed by: OPHTHALMOLOGY

## 2023-08-24 PROCEDURE — 3044F PR MOST RECENT HEMOGLOBIN A1C LEVEL <7.0%: ICD-10-PCS | Mod: CPTII,S$GLB,, | Performed by: OPHTHALMOLOGY

## 2023-08-24 PROCEDURE — 3061F PR NEG MICROALBUMINURIA RESULT DOCUMENTED/REVIEW: ICD-10-PCS | Mod: CPTII,S$GLB,, | Performed by: OPHTHALMOLOGY

## 2023-08-24 PROCEDURE — 1159F PR MEDICATION LIST DOCUMENTED IN MEDICAL RECORD: ICD-10-PCS | Mod: CPTII,S$GLB,, | Performed by: OPHTHALMOLOGY

## 2023-08-24 PROCEDURE — 99024 PR POST-OP FOLLOW-UP VISIT: ICD-10-PCS | Mod: S$GLB,,, | Performed by: OPHTHALMOLOGY

## 2023-08-24 PROCEDURE — 3061F NEG MICROALBUMINURIA REV: CPT | Mod: CPTII,S$GLB,, | Performed by: OPHTHALMOLOGY

## 2023-08-24 PROCEDURE — 3066F NEPHROPATHY DOC TX: CPT | Mod: CPTII,S$GLB,, | Performed by: OPHTHALMOLOGY

## 2023-08-24 PROCEDURE — 92136 IOL MASTER - OS - LEFT EYE: ICD-10-PCS | Mod: 26,LT,S$GLB, | Performed by: OPHTHALMOLOGY

## 2023-08-24 PROCEDURE — 1160F RVW MEDS BY RX/DR IN RCRD: CPT | Mod: CPTII,S$GLB,, | Performed by: OPHTHALMOLOGY

## 2023-08-24 PROCEDURE — 99999 PR PBB SHADOW E&M-EST. PATIENT-LVL III: ICD-10-PCS | Mod: PBBFAC,,, | Performed by: OPHTHALMOLOGY

## 2023-08-24 PROCEDURE — 1160F PR REVIEW ALL MEDS BY PRESCRIBER/CLIN PHARMACIST DOCUMENTED: ICD-10-PCS | Mod: CPTII,S$GLB,, | Performed by: OPHTHALMOLOGY

## 2023-08-24 PROCEDURE — 3066F PR DOCUMENTATION OF TREATMENT FOR NEPHROPATHY: ICD-10-PCS | Mod: CPTII,S$GLB,, | Performed by: OPHTHALMOLOGY

## 2023-08-24 PROCEDURE — 92136 OPHTHALMIC BIOMETRY: CPT | Mod: 26,LT,S$GLB, | Performed by: OPHTHALMOLOGY

## 2023-08-24 RX ORDER — PHENYLEPHRINE HYDROCHLORIDE 25 MG/ML
1 SOLUTION/ DROPS OPHTHALMIC
Status: CANCELLED | OUTPATIENT
Start: 2023-08-24

## 2023-08-24 RX ORDER — PROPARACAINE HYDROCHLORIDE 5 MG/ML
1 SOLUTION/ DROPS OPHTHALMIC
Status: CANCELLED | OUTPATIENT
Start: 2023-08-24

## 2023-08-24 RX ORDER — MOXIFLOXACIN 5 MG/ML
1 SOLUTION/ DROPS OPHTHALMIC 4 TIMES DAILY
Qty: 3 ML | Refills: 1 | Status: SHIPPED | OUTPATIENT
Start: 2023-08-24 | End: 2023-11-16 | Stop reason: ALTCHOICE

## 2023-08-24 RX ORDER — SODIUM CHLORIDE 9 MG/ML
INJECTION, SOLUTION INTRAVENOUS CONTINUOUS
Status: CANCELLED | OUTPATIENT
Start: 2023-08-24

## 2023-08-24 RX ORDER — KETOROLAC TROMETHAMINE 5 MG/ML
1 SOLUTION OPHTHALMIC 4 TIMES DAILY
Qty: 5 ML | Refills: 2 | Status: SHIPPED | OUTPATIENT
Start: 2023-08-24 | End: 2023-11-16 | Stop reason: ALTCHOICE

## 2023-08-24 RX ORDER — TROPICAMIDE 10 MG/ML
1 SOLUTION/ DROPS OPHTHALMIC
Status: CANCELLED | OUTPATIENT
Start: 2023-08-24

## 2023-08-24 RX ORDER — PREDNISOLONE ACETATE 10 MG/ML
1 SUSPENSION/ DROPS OPHTHALMIC 4 TIMES DAILY
Qty: 5 ML | Refills: 2 | Status: SHIPPED | OUTPATIENT
Start: 2023-08-24 | End: 2023-11-16 | Stop reason: ALTCHOICE

## 2023-08-24 NOTE — TELEPHONE ENCOUNTER
I spoke with the patient's wife to discuss acup. His referral was approved for 12 visits and told her I would send a message to the  for the [facility] so he doesn't have to come this way weekly for appts. She voiced understanding.

## 2023-08-24 NOTE — PROGRESS NOTES
HPI     Post-op Evaluation     Additional comments: 1 week post Phaco IOL Right eye. PW for sx 10/18   left eye. Denies eye pain today states vision is a little bit better.    Using all eye gtts as directed.           Last edited by Mattie Bradford on 8/24/2023  9:13 AM.            Assessment /Plan     For exam results, see Encounter Report.    Status post cataract surgery, right    Combined form of age-related cataract, left eye      1. Status post cataract surgery, right  POW1 CEIOL  Doing well  VA currently limited by astigmatism  D/c moxi  Continue PF QID with taper  Continue keto qdaily  Post op instructions reviewed    2. Combined form of age-related cataract, left eye  Patient with visually significant cataract impacting abiliity ADLs (reading, driving, PM driving, glare).  Discussed options, R & B, expectations, patient voices good understanding and wishes to proceed with procedure. Patient will likely benefit from sx and signed consent.    Proceed with CEIOL OS  Monofocal dist IOL, pt declines toric  Will update H&P day of surgery

## 2023-09-01 ENCOUNTER — TELEPHONE (OUTPATIENT)
Dept: HEMATOLOGY/ONCOLOGY | Facility: CLINIC | Age: 73
End: 2023-09-01

## 2023-09-01 NOTE — TELEPHONE ENCOUNTER
----- Message from Ena Mott, PT sent at 9/1/2023 10:16 AM CDT -----  Hel,  Mr. Batres has 10 more visits available.   His treatment was held 2' eye surgery and Mr. Batres cancelled the remaining appointments.    Mr. Batres can call to schedule the remainder of his visits at his convenience, provided his is cleared after his cataract surgery.  SARA Sutherland CLT  ----- Message -----  From: Ml Avery RN  Sent: 8/31/2023   1:29 PM CDT  To: Ena Mott, PT    Good afternoon. It looks like you have seen or are seeing this patient for his PT needs. Dr. Tamez recently saw this patient in office and wanted to see about continuing his PT/OT. Are you able to assist with setting that up or can you tell me how I may go about doing so? Thank you for any help you can be in this matter.   DANA Bull

## 2023-09-01 NOTE — TELEPHONE ENCOUNTER
Spoke to wife made aware that patient still has PT/OT visits available he will just need to reach out to PT to schedule those. Wife verbalized understanding.

## 2023-09-05 ENCOUNTER — OFFICE VISIT (OUTPATIENT)
Dept: SURGICAL ONCOLOGY | Facility: CLINIC | Age: 73
End: 2023-09-05
Payer: MEDICARE

## 2023-09-05 VITALS — BODY MASS INDEX: 22.96 KG/M2 | HEIGHT: 66 IN | WEIGHT: 142.88 LBS

## 2023-09-05 DIAGNOSIS — C32.9 LARYNX CANCER: ICD-10-CM

## 2023-09-05 DIAGNOSIS — C01 MALIGNANT NEOPLASM OF BASE OF TONGUE: ICD-10-CM

## 2023-09-05 DIAGNOSIS — K11.7 SIALORRHEA: Primary | ICD-10-CM

## 2023-09-05 PROCEDURE — 3008F PR BODY MASS INDEX (BMI) DOCUMENTED: ICD-10-PCS | Mod: CPTII,S$GLB,, | Performed by: OTOLARYNGOLOGY

## 2023-09-05 PROCEDURE — 1125F AMNT PAIN NOTED PAIN PRSNT: CPT | Mod: CPTII,S$GLB,, | Performed by: OTOLARYNGOLOGY

## 2023-09-05 PROCEDURE — 3066F NEPHROPATHY DOC TX: CPT | Mod: CPTII,S$GLB,, | Performed by: OTOLARYNGOLOGY

## 2023-09-05 PROCEDURE — 3288F PR FALLS RISK ASSESSMENT DOCUMENTED: ICD-10-PCS | Mod: CPTII,S$GLB,, | Performed by: OTOLARYNGOLOGY

## 2023-09-05 PROCEDURE — 1159F PR MEDICATION LIST DOCUMENTED IN MEDICAL RECORD: ICD-10-PCS | Mod: CPTII,S$GLB,, | Performed by: OTOLARYNGOLOGY

## 2023-09-05 PROCEDURE — 3008F BODY MASS INDEX DOCD: CPT | Mod: CPTII,S$GLB,, | Performed by: OTOLARYNGOLOGY

## 2023-09-05 PROCEDURE — 1101F PT FALLS ASSESS-DOCD LE1/YR: CPT | Mod: CPTII,S$GLB,, | Performed by: OTOLARYNGOLOGY

## 2023-09-05 PROCEDURE — 3288F FALL RISK ASSESSMENT DOCD: CPT | Mod: CPTII,S$GLB,, | Performed by: OTOLARYNGOLOGY

## 2023-09-05 PROCEDURE — 99213 PR OFFICE/OUTPT VISIT, EST, LEVL III, 20-29 MIN: ICD-10-PCS | Mod: S$GLB,,, | Performed by: OTOLARYNGOLOGY

## 2023-09-05 PROCEDURE — 1159F MED LIST DOCD IN RCRD: CPT | Mod: CPTII,S$GLB,, | Performed by: OTOLARYNGOLOGY

## 2023-09-05 PROCEDURE — 99999 PR PBB SHADOW E&M-EST. PATIENT-LVL III: CPT | Mod: PBBFAC,,, | Performed by: OTOLARYNGOLOGY

## 2023-09-05 PROCEDURE — 3044F HG A1C LEVEL LT 7.0%: CPT | Mod: CPTII,S$GLB,, | Performed by: OTOLARYNGOLOGY

## 2023-09-05 PROCEDURE — 1160F PR REVIEW ALL MEDS BY PRESCRIBER/CLIN PHARMACIST DOCUMENTED: ICD-10-PCS | Mod: CPTII,S$GLB,, | Performed by: OTOLARYNGOLOGY

## 2023-09-05 PROCEDURE — 99213 OFFICE O/P EST LOW 20 MIN: CPT | Mod: S$GLB,,, | Performed by: OTOLARYNGOLOGY

## 2023-09-05 PROCEDURE — 1160F RVW MEDS BY RX/DR IN RCRD: CPT | Mod: CPTII,S$GLB,, | Performed by: OTOLARYNGOLOGY

## 2023-09-05 PROCEDURE — 3061F PR NEG MICROALBUMINURIA RESULT DOCUMENTED/REVIEW: ICD-10-PCS | Mod: CPTII,S$GLB,, | Performed by: OTOLARYNGOLOGY

## 2023-09-05 PROCEDURE — 3061F NEG MICROALBUMINURIA REV: CPT | Mod: CPTII,S$GLB,, | Performed by: OTOLARYNGOLOGY

## 2023-09-05 PROCEDURE — 3044F PR MOST RECENT HEMOGLOBIN A1C LEVEL <7.0%: ICD-10-PCS | Mod: CPTII,S$GLB,, | Performed by: OTOLARYNGOLOGY

## 2023-09-05 PROCEDURE — 1125F PR PAIN SEVERITY QUANTIFIED, PAIN PRESENT: ICD-10-PCS | Mod: CPTII,S$GLB,, | Performed by: OTOLARYNGOLOGY

## 2023-09-05 PROCEDURE — 3066F PR DOCUMENTATION OF TREATMENT FOR NEPHROPATHY: ICD-10-PCS | Mod: CPTII,S$GLB,, | Performed by: OTOLARYNGOLOGY

## 2023-09-05 PROCEDURE — 1101F PR PT FALLS ASSESS DOC 0-1 FALLS W/OUT INJ PAST YR: ICD-10-PCS | Mod: CPTII,S$GLB,, | Performed by: OTOLARYNGOLOGY

## 2023-09-05 PROCEDURE — 99999 PR PBB SHADOW E&M-EST. PATIENT-LVL III: ICD-10-PCS | Mod: PBBFAC,,, | Performed by: OTOLARYNGOLOGY

## 2023-09-05 RX ORDER — GLYCOPYRROLATE 1 MG/1
1 TABLET ORAL 3 TIMES DAILY
Qty: 90 TABLET | Refills: 0 | Status: SHIPPED | OUTPATIENT
Start: 2023-09-05 | End: 2023-10-14

## 2023-09-05 NOTE — PROGRESS NOTES
Chief Complaint   Patient presents with    Follow-up     F/U Laryngeal cancer.     Oncology History   Larynx cancer   8/4/2020 Initial Diagnosis    Larynx neoplasm malignant     8/6/2020 Tumor Conference    His case was discussed at the Multidisciplinary Head and Neck Team Planning Meeting.    Representatives from Medical Oncology, Radiation Oncology, Head and Neck Surgical Oncology, Psychosocial Oncology, and Speech and Language Pathology discussed the case with the following recommendations:    1) definitive radiation              8/6/2020 Cancer Staged    Staging form: Larynx - Glottis, AJCC 8th Edition  - Clinical stage from 8/6/2020: Stage II (cT2, cN0, cM0)     8/6/2020 Cancer Staged    Cancer Staging  Larynx neoplasm malignant  Staging form: Larynx - Glottis, AJCC 8th Edition  - Clinical stage from 8/6/2020: Stage II (cT2, cN0, cM0) - Signed by Fariha Muñoz NP on 8/6/2020 12/16/2021 Tumor Conference    His case was discussed at the Multidisciplinary Head and Neck Team Planning Meeting.    Representatives from Medical Oncology, Radiation Oncology, Head and Neck Surgical Oncology, Psychosocial Oncology, and Speech and Language Pathology discussed the case with the following recommendations:    1) TL  2) oncology referral  3) psychology referral            12/27/2021 Surgery    1. DL And tracheostomy  2. PEG     1/6/2022 Surgery    1. Diagnostic direct laryngoscopy  2. Bilateral modified neck dissection of levels 2 through 4  3. Total laryngectomy  4. Total thyroidectomy with bilateral paratracheal/upper mediastinal neck dissection  5. Autotransplantation of left inferior parathyroid into left sternocleidomastoid muscle   6. Left anterolateral thigh free flap for closure of total laryngectomy neck skin defect  7. Advancement flap for closure of left thigh donor site advanced area was 25 x 10 cm     1/20/2022 Cancer Staged    Staging form: Larynx - Glottis, AJCC 8th Edition  - Pathologic stage from  1/20/2022: Stage LOU (pT4a, pN0, cM0)     5/30/2022 Cancer Staged    Staging form: Pharynx - P16 Negative Oropharynx, AJCC 8th Edition  - Clinical: Stage II (rcT2, cN0, cM0)     1/23/2023 -  Chemotherapy    Treatment Summary   Plan Name: OP HEAD NECK CISPLATIN WEEKLY + RADIOTHERAPY  Treatment Goal: Curative  Status: Active  Start Date: 1/23/2023  End Date: 3/6/2023  Provider: Venu Tamez MD  Chemotherapy: CISplatin (Platinol) 40 mg/m2 = 66 mg in sodium chloride 0.9% 631 mL chemo infusion, 40 mg/m2 = 66 mg, Intravenous, Clinic/HOD 1 time, 7 of 7 cycles  Administration: 66 mg (1/23/2023), 66 mg (2/13/2023), 66 mg (2/6/2023), 66 mg (2/20/2023), 66 mg (2/27/2023), 66 mg (3/6/2023)     Malignant neoplasm of base of tongue   5/20/2022 Cancer Staged    Staging form: Pharynx - P16 Negative Oropharynx, AJCC 8th Edition  - Clinical stage from 5/20/2022: Stage II (cT2, cN0, cM0, p16-)     6/22/2022 Initial Diagnosis    Primary cancer of base of tongue     7/11/2022 - 8/26/2022 Chemotherapy    Treatment Summary   Plan Name: OP HEAD NECK PEMBROLIZUMAB CARBOPLATIN FLUOROURACIL Q3W FOLLOWED BY MAINTENANCE PEMBROLIZUMAB 400 MG Q6W  Treatment Goal: Palliative  Status: Inactive  Start Date: 7/11/2022  End Date: 8/26/2022  Provider: Aurash Khoobehi, MD  Chemotherapy: CARBOplatin (PARAPLATIN) 440 mg in sodium chloride 0.9% 544 mL chemo infusion, 440 mg (100 % of original dose 438.5 mg), Intravenous, Clinic/HOD 1 time, 3 of 6 cycles  Dose modification:   (original dose 438.5 mg, Cycle 1)  Administration: 440 mg (7/11/2022), 435 mg (8/1/2022), 510 mg (8/22/2022)     1/23/2023 -  Chemotherapy    Treatment Summary   Plan Name: OP HEAD NECK CISPLATIN WEEKLY + RADIOTHERAPY  Treatment Goal: Curative  Status: Active  Start Date: 1/23/2023  End Date: 3/6/2023  Provider: Venu Tamez MD  Chemotherapy: CISplatin (Platinol) 40 mg/m2 = 66 mg in sodium chloride 0.9% 631 mL chemo infusion, 40 mg/m2 = 66 mg, Intravenous, Clinic/HOD 1  time, 7 of 7 cycles  Administration: 66 mg (1/23/2023), 66 mg (2/13/2023), 66 mg (2/6/2023), 66 mg (2/20/2023), 66 mg (2/27/2023), 66 mg (3/6/2023)           HPI   73 y.o. male presents with the above treatment history.  He is status post total glossectomy and ALT free flap reconstruction.  No new complaints.  He does report some sialorrhea particularly when awake.    Review of Systems   Constitutional: Negative for fatigue and unexpected weight change.   HENT: Per HPI.  Eyes: Negative for visual disturbance.   Respiratory: Negative for shortness of breath, hemoptysis   Cardiovascular: Negative for chest pain and palpitations.   Musculoskeletal: Negative for decreased ROM, back pain.   Skin: Negative for rash, sunburn, itching.   Neurological: Negative for dizziness and seizures.   Hematological: Negative for adenopathy. Does not bruise/bleed easily.   Endocrine: Negative for rapid weight loss/weight gain, heat/cold intolerance.     Past Medical History   Patient Active Problem List   Diagnosis    Melanocytic nevus    Body mass index (BMI) of 29.0-29.9 in adult    Benign hypertension    Overweight    Paroxysmal atrial fibrillation    Type 2 diabetes mellitus with diabetic arthropathy, with long-term current use of insulin    Fatty liver disease, nonalcoholic    False positive serological test for hepatitis C    Hyperlipidemia    Type 2 diabetes mellitus with hyperglycemia, without long-term current use of insulin    Gastroesophageal reflux disease without esophagitis    Type 2 diabetes mellitus with hyperglycemia    Vitamin B12 deficiency    Hyperuricemia    Hypovitaminosis D    Proteinuria    On enteral nutrition    Larynx cancer    Dehydration    NSVT (nonsustained ventricular tachycardia)    Adverse effect of adrenal cortical steroids, sequela    S/P laryngectomy    Hypocalcemia    Aphonia    Dysphagia, pharyngoesophageal    Acute pain of both shoulders    Bilateral arm weakness    Oral bleeding    Malignant  neoplasm of base of tongue    Advanced care planning/counseling discussion    Iron deficiency anemia due to chronic blood loss    Postoperative hypothyroidism    Pressure injury of sacral region, stage 1    Pneumatosis intestinalis    Nausea and vomiting    Neutropenia    Abnormal CT scan, neck    Open wound of neck    Open wnd of pharynx    Open wound of left thigh    Open wound of mouth    Tracheostomy in place    Malnutrition of mild degree    Type 2 diabetes mellitus, without long-term current use of insulin    Laryngeal cancer    Hypocalcemia    Hyperbilirubinemia    Elevated transaminase level    Hyponatremia    PEG (percutaneous endoscopic gastrostomy) adjustment/replacement/removal    Hypothyroidism    Dehydration    Pharyngocutaneous fistula    Protein malnutrition    Elevated alkaline phosphatase level    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    Scar conditions and fibrosis of skin    Decreased ROM of neck    Iron deficiency anemia    Chemotherapy-induced peripheral neuropathy    Phantom pain    Chronic low back pain           Past Surgical History   Past Surgical History:   Procedure Laterality Date    CATARACT EXTRACTION W/  INTRAOCULAR LENS IMPLANT Right 8/16/2023    Procedure: CEIOL OD  NPO-peg;  Surgeon: Stoney Munoz MD;  Location: [facility] OR;  Service: Ophthalmology;  Laterality: Right;    DIRECT LARYNGOBRONCHOSCOPY N/A 12/27/2021    Procedure: LARYNGOSCOPY, DIRECT, WITH BRONCHOSCOPY;  Surgeon: Flex Espinosa MD;  Location: [address];  Service: ENT;  Laterality: N/A;    DIRECT LARYNGOSCOPY  11/9/2022    Procedure: LARYNGOSCOPY, DIRECT;  Surgeon: Jesse James MD;  Location: [address];  Service: ENT;;    DISSECTION OF NECK Bilateral 1/6/2022    Procedure: DISSECTION, NECK;  Surgeon: Jesse James MD;  Location: [address];  Service: ENT;  Laterality: Bilateral;    DISSECTION OF NECK Bilateral 11/9/2022    Procedure: DISSECTION, NECK;  Surgeon: Jesse James MD;   Location: [facility];  Service: ENT;  Laterality: Bilateral;    FLAP PROCEDURE Right 1/6/2022    Procedure: CREATION, FREE FLAP;  Surgeon: Alise Hart MD;  Location: [facility] OR [facility];  Service: ENT;  Laterality: Right;  Ischemic start 1351  Ischemic stop 1502    FLAP PROCEDURE Left 11/9/2022    Procedure: CREATION, FREE FLAP, ALT;  Surgeon: Alise Hart MD;  [facility];  Service: ENT;  Laterality: Left;    GLOSSECTOMY Bilateral 11/9/2022    Procedure: TOTAL GLOSSECTOMY;  Surgeon: Jesse James MD;  [facility];  Service: ENT;  Laterality: Bilateral;    INSERTION OF TUNNELED CENTRAL VENOUS CATHETER (CVC) WITH SUBCUTANEOUS PORT N/A 6/9/2022    Procedure: [id number];  Surgeon: Jessu Viera MD;  Location: [facility];  Service: General;  Laterality: N/A;    INSERTION OF TUNNELED CENTRAL VENOUS CATHETER (CVC) WITH SUBCUTANEOUS PORT Right 1/12/2023    Procedure: [id number];  Surgeon: Abdulaziz Le Jr., MD;  Location: [facility];  Service: General;  Laterality: Right;    LARYNGECTOMY N/A 1/6/2022    Procedure: LARYNGECTOMY;  Surgeon: Jesse James MD;  [facility];  Service: ENT;  Laterality: N/A;    LARYNGOSCOPY N/A 8/4/2020    Procedure: Suspension microlaryngoscopy with biopsy, possible KTP laser treatment/excision;  Surgeon: Stew Noel MD;  [address];  Service: ENT;  Laterality: N/A;  Microscope, telescopes, tower, microinstruments, KTP laser, rep conf# [id number] IC 7/28.    LARYNGOSCOPY N/A 3/16/2021    Procedure: Suspension microlaryngoscopy with excision of lesion, possible CO2 laser;  Surgeon: Stew Noel MD;  [facility];  Service: ENT;  Laterality: N/A;  Microscope, telescopes, tower, microinstruments, CO2 laser, rep conf# [id number] IC 3/4.    LARYNGOSCOPY N/A 4/1/2021    Procedure: Suspension microlaryngoscopy with KTP laser excision of lesion;  Surgeon: Stew Noel MD;   Location: [facility] OR [facility];  Service: ENT;  Laterality: N/A;  Microscope, telescopes, tower, microinstruments, 70 degree scope, vocal fold , KTP laser, rep conf# [id number] BC    LARYNGOSCOPY N/A 12/9/2021    Procedure: Suspension microlaryngoscopy with biopsy;  Surgeon: tSew Noel MD;  [facility];  Service: ENT;  Laterality: N/A;  Microscope, telescopes, tower, microinstruments    LARYNGOSCOPY N/A 1/6/2022    Procedure: LARYNGOSCOPY;  Surgeon: Jesse James MD;  [facility];  Service: ENT;  Laterality: N/A;    LARYNGOSCOPY N/A 4/27/2022    Procedure: LARYNGOSCOPY WITH BIOPSY;  Surgeon: Jesse James MD;  [facility];  Service: ENT;  Laterality: N/A;    MICROLARYNGOSCOPY N/A 3/17/2020    Procedure: MICROLARYNGOSCOPY;  Surgeon: Jung Xiao MD;  Location: [facility];  Service: ENT;  Laterality: N/A;  Laser Microlaryngoscopy  NEED TO SCHEDULE LASER from [place] [phone]    PHARYNGECTOMY  11/9/2022    Procedure: TOTAL PHARYNGECTOMY;  Surgeon: Jesse James MD;  [facility];  Service: ENT;;    REIMPLANTATION OF PARATHYROID TISSUE N/A 1/6/2022    Procedure: REIMPLANTATION, PARATHYROID TISSUE;  Surgeon: Jesse James MD;  [facility];  Service: ENT;  Laterality: N/A;    SKIN SPLIT GRAFT Right 11/9/2022    Procedure: APPLICATION, GRAFT, SKIN, SPLIT-THICKNESS;  Surgeon: Jesse James MD;  [facility];  Service: ENT;  Laterality: Right;    THYROIDECTOMY  1/6/2022    Procedure: THYROIDECTOMY;  Surgeon: Jesse James MD;  [facility];  Service: ENT;;    TRACHEOSTOMY N/A 12/27/2021    Procedure: CREATION, TRACHEOSTOMY;  Surgeon: Flex Espinosa MD;  [facility];  Service: ENT;  Laterality: N/A;         Family History   Family History   Problem Relation Age of Onset    Abnormal EKG Mother     Diabetes Father     Heart disease Father     Hypertension  Father            Social History   .  Social History     Socioeconomic History    Marital status:      Spouse name: Janel Batres    Number of children: 2   Occupational History    Occupation: [employer]     Employer: [employer]   Tobacco Use    Smoking status: Never    Smokeless tobacco: Never   Substance and Sexual Activity    Alcohol use: Not Currently     Comment: occasional    Drug use: No    Sexual activity: Yes     Partners: Female   Social History Narrative    ** Merged History Encounter **         2 children from his 1st wife     Social Determinants of Health     Financial Resource Strain: Low Risk  (8/16/2023)    Overall Financial Resource Strain (CARDIA)     Difficulty of Paying Living Expenses: Not hard at all   Food Insecurity: No Food Insecurity (8/16/2023)    Hunger Vital Sign     Worried About Running Out of Food in the Last Year: Never true     Ran Out of Food in the Last Year: Never true   Transportation Needs: No Transportation Needs (8/16/2023)    PRAPARE - Transportation     Lack of Transportation (Medical): No     Lack of Transportation (Non-Medical): No   Physical Activity: Sufficiently Active (8/16/2023)    Exercise Vital Sign     Days of Exercise per Week: 7 days     Minutes of Exercise per Session: 60 min   Stress: No Stress Concern Present (8/16/2023)    [employer] [place] [employer] - Occupational Stress Questionnaire     Feeling of Stress : Not at all   Social Connections: Socially Isolated (8/16/2023)    Social Connection and Isolation Panel [NHANES]     Frequency of Communication with Friends and Family: Never     Frequency of Social Gatherings with Friends and Family: Never     Attends Latter day Services: Never     Active Member of Clubs or Organizations: No     Attends Club or Organization Meetings: Never     Marital Status:    Housing Stability: Low Risk  (8/16/2023)    Housing Stability Vital Sign     Unable to Pay for Housing in the Last Year: No      Number of Places Lived in the Last Year: 1     Unstable Housing in the Last Year: No         Allergies   Review of patient's allergies indicates:   Allergen Reactions    Lovastatin Itching    Pollen extracts     Lovastatin Rash     Not confirmed but pt skeptical           Physical Exam     There were no vitals filed for this visit.          Body mass index is 23.06 kg/m².      General: AOx3, NAD   Respiratory:  Symmetric chest rise, normal effort  Oral Cavity:  Flap healthy. No worrisome lesions. Moderate trismus.  Oropharynx:  No masses/lesions of the posterior pharyngeal wall. Tonsillar fossa without lesions. Soft palate without masses. Midline uvula.   Neck: Stoma widely patent. Skin paddle healthy with good color and turgor.Well-healed neck incisions.No LAD. Moderate post-op, post-treatment fibrosis. Submental neck diffusely full.  Face: House Brackmann I bilaterally.    Neopharynx clear on mirror exam.      Assessment/Plan  Problem List Items Addressed This Visit          Oncology    Larynx cancer    Malignant neoplasm of base of tongue     ZAY.  RTC 1 month.  Robinul for sialorrhea.          Other Visit Diagnoses       Sialorrhea    -  Primary    Relevant Medications    glycopyrrolate (ROBINUL) 1 mg Tab

## 2023-09-11 ENCOUNTER — PATIENT MESSAGE (OUTPATIENT)
Dept: SURGICAL ONCOLOGY | Facility: CLINIC | Age: 73
End: 2023-09-11
Payer: MEDICARE

## 2023-09-12 ENCOUNTER — CLINICAL SUPPORT (OUTPATIENT)
Dept: REHABILITATION | Facility: HOSPITAL | Age: 73
End: 2023-09-12
Payer: MEDICARE

## 2023-09-12 DIAGNOSIS — G89.29 CHRONIC LOW BACK PAIN, UNSPECIFIED BACK PAIN LATERALITY, UNSPECIFIED WHETHER SCIATICA PRESENT: ICD-10-CM

## 2023-09-12 DIAGNOSIS — R52 PHANTOM PAIN: ICD-10-CM

## 2023-09-12 DIAGNOSIS — G54.8 PHANTOM PAIN: ICD-10-CM

## 2023-09-12 DIAGNOSIS — T45.1X5A CHEMOTHERAPY-INDUCED PERIPHERAL NEUROPATHY: Primary | ICD-10-CM

## 2023-09-12 DIAGNOSIS — G62.0 CHEMOTHERAPY-INDUCED PERIPHERAL NEUROPATHY: Primary | ICD-10-CM

## 2023-09-12 DIAGNOSIS — M54.50 CHRONIC LOW BACK PAIN, UNSPECIFIED BACK PAIN LATERALITY, UNSPECIFIED WHETHER SCIATICA PRESENT: ICD-10-CM

## 2023-09-12 PROCEDURE — 97810 ACUP 1/> WO ESTIM 1ST 15 MIN: CPT | Mod: PN

## 2023-09-12 PROCEDURE — 97811 ACUP 1/> W/O ESTIM EA ADD 15: CPT | Mod: PN

## 2023-09-14 ENCOUNTER — INFUSION (OUTPATIENT)
Dept: INFUSION THERAPY | Facility: HOSPITAL | Age: 73
End: 2023-09-14
Attending: INTERNAL MEDICINE
Payer: MEDICARE

## 2023-09-14 VITALS
OXYGEN SATURATION: 100 % | BODY MASS INDEX: 22.98 KG/M2 | SYSTOLIC BLOOD PRESSURE: 112 MMHG | WEIGHT: 143 LBS | TEMPERATURE: 98 F | HEART RATE: 64 BPM | DIASTOLIC BLOOD PRESSURE: 65 MMHG | RESPIRATION RATE: 17 BRPM | HEIGHT: 66 IN

## 2023-09-14 DIAGNOSIS — D50.9 IRON DEFICIENCY ANEMIA, UNSPECIFIED IRON DEFICIENCY ANEMIA TYPE: Primary | ICD-10-CM

## 2023-09-14 PROCEDURE — 96365 THER/PROPH/DIAG IV INF INIT: CPT

## 2023-09-14 PROCEDURE — 96367 TX/PROPH/DG ADDL SEQ IV INF: CPT

## 2023-09-14 PROCEDURE — 25000003 PHARM REV CODE 250: Performed by: INTERNAL MEDICINE

## 2023-09-14 PROCEDURE — 63600175 PHARM REV CODE 636 W HCPCS: Performed by: INTERNAL MEDICINE

## 2023-09-14 RX ORDER — EPINEPHRINE 0.3 MG/.3ML
0.3 INJECTION SUBCUTANEOUS ONCE AS NEEDED
Status: CANCELLED | OUTPATIENT
Start: 2023-09-21

## 2023-09-14 RX ORDER — SODIUM CHLORIDE 0.9 % (FLUSH) 0.9 %
10 SYRINGE (ML) INJECTION
Status: DISCONTINUED | OUTPATIENT
Start: 2023-09-14 | End: 2023-09-14 | Stop reason: HOSPADM

## 2023-09-14 RX ORDER — HEPARIN 100 UNIT/ML
500 SYRINGE INTRAVENOUS
Status: CANCELLED | OUTPATIENT
Start: 2023-09-21

## 2023-09-14 RX ORDER — HEPARIN 100 UNIT/ML
500 SYRINGE INTRAVENOUS
Status: DISCONTINUED | OUTPATIENT
Start: 2023-09-14 | End: 2023-09-14 | Stop reason: HOSPADM

## 2023-09-14 RX ORDER — DIPHENHYDRAMINE HYDROCHLORIDE 50 MG/ML
50 INJECTION INTRAMUSCULAR; INTRAVENOUS ONCE AS NEEDED
Status: CANCELLED | OUTPATIENT
Start: 2023-09-21

## 2023-09-14 RX ORDER — SODIUM CHLORIDE 0.9 % (FLUSH) 0.9 %
10 SYRINGE (ML) INJECTION
Status: CANCELLED | OUTPATIENT
Start: 2023-09-21

## 2023-09-14 RX ORDER — DIPHENHYDRAMINE HYDROCHLORIDE 50 MG/ML
25 INJECTION INTRAMUSCULAR; INTRAVENOUS
Status: CANCELLED
Start: 2023-09-21

## 2023-09-14 RX ORDER — CALCITRIOL 1 UG/ML
0.25 SOLUTION ORAL DAILY
Qty: 15 ML | Refills: 12 | Status: CANCELLED | OUTPATIENT
Start: 2023-09-14

## 2023-09-14 RX ADMIN — DIPHENHYDRAMINE HYDROCHLORIDE 25 MG: 50 INJECTION INTRAMUSCULAR; INTRAVENOUS at 02:09

## 2023-09-14 RX ADMIN — SODIUM CHLORIDE: 0.9 INJECTION, SOLUTION INTRAVENOUS at 02:09

## 2023-09-14 RX ADMIN — IRON SUCROSE 100 MG: 20 INJECTION, SOLUTION INTRAVENOUS at 02:09

## 2023-09-14 RX ADMIN — HEPARIN 500 UNITS: 100 SYRINGE at 04:09

## 2023-09-14 NOTE — PROGRESS NOTES
Acupuncture Evaluation Note     Name: Cyrus Batres Jr.  Clinic Number: [id number]    Traditional Chinese Medicine (TCM) Diagnosis: Qi Stagnation  Medical Diagnosis:   Encounter Diagnoses   Name Primary?    Chronic low back pain, unspecified back pain laterality, unspecified whether sciatica present     Phantom pain     Chemotherapy-induced peripheral neuropathy Yes        Evaluation Date: 9/14/2023    Visit #/Visits authorized: 1/12     Precautions: Standard and Immunosuppression    Subjective     Chief Concern: Phantom tongue pain. 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 Non-verbal but writes on ipad etc., indicates he has some back pain and phantom tongue pain and mouth pain. No swelling inside mouth reported.    Medical necessity is demonstrated by the following IMPAIRMENTS: Medical Necessity: Cancer related pain, Decreased mobility limits day to day activities, social, and emergent situations, and Decreased quality of life              Aggravating Factors:  movement   Relieving Factors:  nothing    Symptom Description:     Quality:  Aching  Severity:  8 or varies but high pain  Frequency:  continuously    Objective       Pulse:        wiry       Treatment       Acupuncture points used:  K3/UB 62, Du20-24, Lv 5, Ht8, LI 4  Needles In: 12  Needles Out: 12  Needles W/O STIM placed: 3:15pm  Needles W/O STIM removed: 4pm      Assessment     After treatment, patient felt more relaxed    Patient prognosis is Fair.     Patient will continue to benefit from acupuncture treatment to address the deficits listed in the problem list box on initial evaluation, provide patient family education and to maximize pt's level of independence in the home and community environment.     Patient's spiritual, cultural and educational needs considered and pt agreeable to plan of care and goals.     Anticipated barriers to treatment: none    Plan     Recommend every couple weeks for 12 sessions with midway re-assess.       Education:  Patient is aware of cumulative benefit of acupuncture

## 2023-09-18 RX ORDER — CALCITRIOL 1 UG/ML
0.25 SOLUTION ORAL DAILY
Qty: 15 ML | Refills: 12 | Status: CANCELLED | OUTPATIENT
Start: 2023-09-14

## 2023-09-21 ENCOUNTER — HOSPITAL ENCOUNTER (OUTPATIENT)
Dept: RADIOLOGY | Facility: HOSPITAL | Age: 73
Discharge: HOME | End: 2023-09-21
Attending: INTERNAL MEDICINE
Payer: MEDICARE

## 2023-09-21 ENCOUNTER — INFUSION (OUTPATIENT)
Dept: INFUSION THERAPY | Facility: HOSPITAL | Age: 73
End: 2023-09-21
Attending: INTERNAL MEDICINE
Payer: MEDICARE

## 2023-09-21 VITALS
TEMPERATURE: 97 F | HEART RATE: 67 BPM | BODY MASS INDEX: 22.79 KG/M2 | DIASTOLIC BLOOD PRESSURE: 63 MMHG | WEIGHT: 141.81 LBS | RESPIRATION RATE: 17 BRPM | HEIGHT: 66 IN | SYSTOLIC BLOOD PRESSURE: 112 MMHG | OXYGEN SATURATION: 100 %

## 2023-09-21 DIAGNOSIS — E83.51 HYPOCALCEMIA: ICD-10-CM

## 2023-09-21 DIAGNOSIS — C32.9 LARYNX CANCER: ICD-10-CM

## 2023-09-21 DIAGNOSIS — D50.9 IRON DEFICIENCY ANEMIA, UNSPECIFIED IRON DEFICIENCY ANEMIA TYPE: Primary | ICD-10-CM

## 2023-09-21 DIAGNOSIS — C32.9 LARYNGEAL CANCER: ICD-10-CM

## 2023-09-21 LAB
CREAT SERPL-MCNC: 1.2 MG/DL (ref 0.5–1.4)
SAMPLE: NORMAL

## 2023-09-21 PROCEDURE — 25500020 PHARM REV CODE 255: Mod: PO | Performed by: INTERNAL MEDICINE

## 2023-09-21 PROCEDURE — 96367 TX/PROPH/DG ADDL SEQ IV INF: CPT

## 2023-09-21 PROCEDURE — 82565 ASSAY OF CREATININE: CPT | Mod: PO

## 2023-09-21 PROCEDURE — 25000003 PHARM REV CODE 250: Performed by: INTERNAL MEDICINE

## 2023-09-21 PROCEDURE — 63600175 PHARM REV CODE 636 W HCPCS: Performed by: INTERNAL MEDICINE

## 2023-09-21 PROCEDURE — 96365 THER/PROPH/DIAG IV INF INIT: CPT

## 2023-09-21 PROCEDURE — 70491 CT SOFT TISSUE NECK W/DYE: CPT | Mod: TC,PO

## 2023-09-21 RX ORDER — CALCIUM CARBONATE 1250 MG/5ML
SUSPENSION ORAL
Qty: 2300 ML | Refills: 12 | Status: SHIPPED | OUTPATIENT
Start: 2023-09-21

## 2023-09-21 RX ORDER — HEPARIN 100 UNIT/ML
500 SYRINGE INTRAVENOUS
Status: DISCONTINUED | OUTPATIENT
Start: 2023-09-21 | End: 2023-09-21 | Stop reason: HOSPADM

## 2023-09-21 RX ORDER — SODIUM CHLORIDE 0.9 % (FLUSH) 0.9 %
10 SYRINGE (ML) INJECTION
Status: CANCELLED | OUTPATIENT
Start: 2023-09-28

## 2023-09-21 RX ORDER — EPINEPHRINE 0.3 MG/.3ML
0.3 INJECTION SUBCUTANEOUS ONCE AS NEEDED
Status: CANCELLED | OUTPATIENT
Start: 2023-09-28

## 2023-09-21 RX ORDER — DIPHENHYDRAMINE HYDROCHLORIDE 50 MG/ML
50 INJECTION INTRAMUSCULAR; INTRAVENOUS ONCE AS NEEDED
Status: CANCELLED | OUTPATIENT
Start: 2023-09-28

## 2023-09-21 RX ORDER — HEPARIN 100 UNIT/ML
500 SYRINGE INTRAVENOUS
Status: CANCELLED | OUTPATIENT
Start: 2023-09-28

## 2023-09-21 RX ORDER — SODIUM CHLORIDE 0.9 % (FLUSH) 0.9 %
10 SYRINGE (ML) INJECTION
Status: DISCONTINUED | OUTPATIENT
Start: 2023-09-21 | End: 2023-09-21 | Stop reason: HOSPADM

## 2023-09-21 RX ORDER — DIPHENHYDRAMINE HYDROCHLORIDE 50 MG/ML
25 INJECTION INTRAMUSCULAR; INTRAVENOUS
Status: CANCELLED
Start: 2023-09-28

## 2023-09-21 RX ADMIN — SODIUM CHLORIDE: 0.9 INJECTION, SOLUTION INTRAVENOUS at 01:09

## 2023-09-21 RX ADMIN — DIPHENHYDRAMINE HYDROCHLORIDE 25 MG: 50 INJECTION INTRAMUSCULAR; INTRAVENOUS at 01:09

## 2023-09-21 RX ADMIN — IRON SUCROSE 100 MG: 20 INJECTION, SOLUTION INTRAVENOUS at 01:09

## 2023-09-21 RX ADMIN — IOHEXOL 100 ML: 350 INJECTION, SOLUTION INTRAVENOUS at 09:09

## 2023-09-21 RX ADMIN — HEPARIN 500 UNITS: 100 SYRINGE at 03:09

## 2023-09-21 NOTE — PLAN OF CARE
Problem: Fatigue  Goal: Improved Activity Tolerance  Intervention: Promote Improved Energy  Flowsheets (Taken 9/21/2023 1310)  Fatigue Management: fatigue-related activity identified  Activity Management: Ambulated -L4

## 2023-09-22 ENCOUNTER — LAB VISIT (OUTPATIENT)
Dept: LAB | Facility: HOSPITAL | Age: 73
End: 2023-09-22
Attending: NURSE PRACTITIONER
Payer: MEDICARE

## 2023-09-22 ENCOUNTER — PATIENT MESSAGE (OUTPATIENT)
Dept: HEMATOLOGY/ONCOLOGY | Facility: CLINIC | Age: 73
End: 2023-09-22

## 2023-09-22 DIAGNOSIS — C01 MALIGNANT NEOPLASM OF BASE OF TONGUE: ICD-10-CM

## 2023-09-22 LAB
ALBUMIN SERPL BCP-MCNC: 4.1 G/DL (ref 3.5–5.2)
ALP SERPL-CCNC: 172 U/L (ref 55–135)
ALT SERPL W/O P-5'-P-CCNC: 26 U/L (ref 10–44)
ANION GAP SERPL CALC-SCNC: 8 MMOL/L (ref 8–16)
AST SERPL-CCNC: 23 U/L (ref 10–40)
BASOPHILS # BLD AUTO: 0.02 K/UL (ref 0–0.2)
BASOPHILS NFR BLD: 0.4 % (ref 0–1.9)
BILIRUB SERPL-MCNC: 0.9 MG/DL (ref 0.1–1)
BUN SERPL-MCNC: 24 MG/DL (ref 8–23)
CALCIUM SERPL-MCNC: 8.7 MG/DL (ref 8.7–10.5)
CHLORIDE SERPL-SCNC: 99 MMOL/L (ref 95–110)
CO2 SERPL-SCNC: 28 MMOL/L (ref 23–29)
CREAT SERPL-MCNC: 1.1 MG/DL (ref 0.5–1.4)
DIFFERENTIAL METHOD: ABNORMAL
EOSINOPHIL # BLD AUTO: 0.3 K/UL (ref 0–0.5)
EOSINOPHIL NFR BLD: 5.6 % (ref 0–8)
ERYTHROCYTE [DISTWIDTH] IN BLOOD BY AUTOMATED COUNT: 13 % (ref 11.5–14.5)
EST. GFR  (NO RACE VARIABLE): >60 ML/MIN/1.73 M^2
GLUCOSE SERPL-MCNC: 193 MG/DL (ref 70–110)
HCT VFR BLD AUTO: 34.1 % (ref 40–54)
HGB BLD-MCNC: 11.2 G/DL (ref 14–18)
IMM GRANULOCYTES # BLD AUTO: 0.03 K/UL (ref 0–0.04)
IMM GRANULOCYTES NFR BLD AUTO: 0.7 % (ref 0–0.5)
LYMPHOCYTES # BLD AUTO: 0.6 K/UL (ref 1–4.8)
LYMPHOCYTES NFR BLD: 14 % (ref 18–48)
MCH RBC QN AUTO: 30.9 PG (ref 27–31)
MCHC RBC AUTO-ENTMCNC: 32.8 G/DL (ref 32–36)
MCV RBC AUTO: 94 FL (ref 82–98)
MONOCYTES # BLD AUTO: 0.3 K/UL (ref 0.3–1)
MONOCYTES NFR BLD: 6.2 % (ref 4–15)
NEUTROPHILS # BLD AUTO: 3.3 K/UL (ref 1.8–7.7)
NEUTROPHILS NFR BLD: 73.1 % (ref 38–73)
NRBC BLD-RTO: 0 /100 WBC
PLATELET # BLD AUTO: 144 K/UL (ref 150–450)
PMV BLD AUTO: 11.8 FL (ref 9.2–12.9)
POTASSIUM SERPL-SCNC: 4.1 MMOL/L (ref 3.5–5.1)
PROT SERPL-MCNC: 6.9 G/DL (ref 6–8.4)
RBC # BLD AUTO: 3.62 M/UL (ref 4.6–6.2)
SODIUM SERPL-SCNC: 135 MMOL/L (ref 136–145)
WBC # BLD AUTO: 4.49 K/UL (ref 3.9–12.7)

## 2023-09-22 PROCEDURE — 36415 COLL VENOUS BLD VENIPUNCTURE: CPT | Performed by: NURSE PRACTITIONER

## 2023-09-22 PROCEDURE — 80053 COMPREHEN METABOLIC PANEL: CPT | Performed by: NURSE PRACTITIONER

## 2023-09-22 PROCEDURE — 85025 COMPLETE CBC W/AUTO DIFF WBC: CPT | Performed by: NURSE PRACTITIONER

## 2023-09-22 RX ORDER — CALCIUM CARBONATE 1250 MG/5ML
SUSPENSION ORAL
Refills: 12 | OUTPATIENT
Start: 2023-09-22

## 2023-09-22 NOTE — TELEPHONE ENCOUNTER
Signed Yesterday (9/21/2023):   calcium carbonate 500 mg/5 mL (1,250 mg/5 mL)   Sig: Take 20 mls four times daily with meals and nightly.   Disp:  2300 mL    Refills:  12   Signed by: MICHELLE Silveira PA-C   Encounter Details

## 2023-09-26 ENCOUNTER — PATIENT MESSAGE (OUTPATIENT)
Dept: REHABILITATION | Facility: HOSPITAL | Age: 73
End: 2023-09-26
Payer: MEDICARE

## 2023-09-26 PROBLEM — G89.3 CANCER RELATED PAIN: Status: ACTIVE | Noted: 2023-09-26

## 2023-09-26 PROBLEM — Z51.5 PALLIATIVE CARE BY SPECIALIST: Status: ACTIVE | Noted: 2023-09-26

## 2023-09-28 ENCOUNTER — INFUSION (OUTPATIENT)
Dept: INFUSION THERAPY | Facility: HOSPITAL | Age: 73
End: 2023-09-28
Attending: INTERNAL MEDICINE
Payer: MEDICARE

## 2023-09-28 VITALS
HEIGHT: 66 IN | WEIGHT: 144 LBS | HEART RATE: 60 BPM | DIASTOLIC BLOOD PRESSURE: 64 MMHG | BODY MASS INDEX: 23.14 KG/M2 | SYSTOLIC BLOOD PRESSURE: 115 MMHG | RESPIRATION RATE: 16 BRPM | OXYGEN SATURATION: 100 % | TEMPERATURE: 98 F

## 2023-09-28 DIAGNOSIS — D50.9 IRON DEFICIENCY ANEMIA, UNSPECIFIED IRON DEFICIENCY ANEMIA TYPE: Primary | ICD-10-CM

## 2023-09-28 PROCEDURE — 63600175 PHARM REV CODE 636 W HCPCS: Performed by: INTERNAL MEDICINE

## 2023-09-28 PROCEDURE — 96367 TX/PROPH/DG ADDL SEQ IV INF: CPT

## 2023-09-28 PROCEDURE — 96365 THER/PROPH/DIAG IV INF INIT: CPT

## 2023-09-28 PROCEDURE — A4216 STERILE WATER/SALINE, 10 ML: HCPCS | Performed by: INTERNAL MEDICINE

## 2023-09-28 PROCEDURE — 25000003 PHARM REV CODE 250: Performed by: INTERNAL MEDICINE

## 2023-09-28 RX ORDER — HEPARIN 100 UNIT/ML
500 SYRINGE INTRAVENOUS
Status: DISCONTINUED | OUTPATIENT
Start: 2023-09-28 | End: 2023-09-28 | Stop reason: HOSPADM

## 2023-09-28 RX ORDER — DIPHENHYDRAMINE HYDROCHLORIDE 50 MG/ML
25 INJECTION INTRAMUSCULAR; INTRAVENOUS
Status: CANCELLED
Start: 2023-10-05

## 2023-09-28 RX ORDER — EPINEPHRINE 0.3 MG/.3ML
0.3 INJECTION SUBCUTANEOUS ONCE AS NEEDED
Status: CANCELLED | OUTPATIENT
Start: 2023-10-05

## 2023-09-28 RX ORDER — SODIUM CHLORIDE 0.9 % (FLUSH) 0.9 %
10 SYRINGE (ML) INJECTION
Status: CANCELLED | OUTPATIENT
Start: 2023-10-05

## 2023-09-28 RX ORDER — DIPHENHYDRAMINE HYDROCHLORIDE 50 MG/ML
50 INJECTION INTRAMUSCULAR; INTRAVENOUS ONCE AS NEEDED
Status: CANCELLED | OUTPATIENT
Start: 2023-10-05

## 2023-09-28 RX ORDER — HEPARIN 100 UNIT/ML
500 SYRINGE INTRAVENOUS
Status: CANCELLED | OUTPATIENT
Start: 2023-10-05

## 2023-09-28 RX ORDER — SODIUM CHLORIDE 0.9 % (FLUSH) 0.9 %
10 SYRINGE (ML) INJECTION
Status: DISCONTINUED | OUTPATIENT
Start: 2023-09-28 | End: 2023-09-28 | Stop reason: HOSPADM

## 2023-09-28 RX ADMIN — IRON SUCROSE 100 MG: 20 INJECTION, SOLUTION INTRAVENOUS at 02:09

## 2023-09-28 RX ADMIN — SODIUM CHLORIDE: 0.9 INJECTION, SOLUTION INTRAVENOUS at 02:09

## 2023-09-28 RX ADMIN — HEPARIN 500 UNITS: 100 SYRINGE at 04:09

## 2023-09-28 RX ADMIN — DIPHENHYDRAMINE HYDROCHLORIDE 25 MG: 50 INJECTION INTRAMUSCULAR; INTRAVENOUS at 02:09

## 2023-09-28 RX ADMIN — SODIUM CHLORIDE, PRESERVATIVE FREE 10 ML: 5 INJECTION INTRAVENOUS at 04:09

## 2023-09-28 NOTE — PLAN OF CARE
Problem: Fall Injury Risk  Goal: Absence of Fall and Fall-Related Injury  Outcome: Ongoing, Progressing  Intervention: Identify and Manage Contributors  Flowsheets (Taken 9/28/2023 1408)  Self-Care Promotion: independence encouraged  Medication Review/Management: medications reviewed  Intervention: Promote Injury-Free Environment  Flowsheets (Taken 9/28/2023 1408)  Safety Promotion/Fall Prevention: medications reviewed

## 2023-10-02 ENCOUNTER — TELEPHONE (OUTPATIENT)
Dept: OTOLARYNGOLOGY | Facility: CLINIC | Age: 73
End: 2023-10-02
Payer: MEDICARE

## 2023-10-02 NOTE — TELEPHONE ENCOUNTER
Left message for pt to call back to reschedule appt with Dr. James tomorrow due to surgery being moved up.

## 2023-10-03 ENCOUNTER — CLINICAL SUPPORT (OUTPATIENT)
Dept: REHABILITATION | Facility: HOSPITAL | Age: 73
End: 2023-10-03
Payer: MEDICARE

## 2023-10-03 DIAGNOSIS — G89.29 CHRONIC LOW BACK PAIN, UNSPECIFIED BACK PAIN LATERALITY, UNSPECIFIED WHETHER SCIATICA PRESENT: Primary | ICD-10-CM

## 2023-10-03 DIAGNOSIS — M54.50 CHRONIC LOW BACK PAIN, UNSPECIFIED BACK PAIN LATERALITY, UNSPECIFIED WHETHER SCIATICA PRESENT: Primary | ICD-10-CM

## 2023-10-03 PROCEDURE — 97811 ACUP 1/> W/O ESTIM EA ADD 15: CPT | Mod: PN

## 2023-10-03 PROCEDURE — 97810 ACUP 1/> WO ESTIM 1ST 15 MIN: CPT | Mod: PN

## 2023-10-05 ENCOUNTER — INFUSION (OUTPATIENT)
Dept: INFUSION THERAPY | Facility: HOSPITAL | Age: 73
End: 2023-10-05
Attending: INTERNAL MEDICINE
Payer: MEDICARE

## 2023-10-05 VITALS
WEIGHT: 142.5 LBS | TEMPERATURE: 98 F | OXYGEN SATURATION: 100 % | SYSTOLIC BLOOD PRESSURE: 117 MMHG | DIASTOLIC BLOOD PRESSURE: 71 MMHG | RESPIRATION RATE: 18 BRPM | BODY MASS INDEX: 22.9 KG/M2 | HEART RATE: 60 BPM | HEIGHT: 66 IN

## 2023-10-05 DIAGNOSIS — D50.9 IRON DEFICIENCY ANEMIA, UNSPECIFIED IRON DEFICIENCY ANEMIA TYPE: Primary | ICD-10-CM

## 2023-10-05 PROCEDURE — A4216 STERILE WATER/SALINE, 10 ML: HCPCS | Performed by: INTERNAL MEDICINE

## 2023-10-05 PROCEDURE — 96367 TX/PROPH/DG ADDL SEQ IV INF: CPT

## 2023-10-05 PROCEDURE — 63600175 PHARM REV CODE 636 W HCPCS: Performed by: INTERNAL MEDICINE

## 2023-10-05 PROCEDURE — 96365 THER/PROPH/DIAG IV INF INIT: CPT

## 2023-10-05 PROCEDURE — 25000003 PHARM REV CODE 250: Performed by: INTERNAL MEDICINE

## 2023-10-05 RX ORDER — SODIUM CHLORIDE 0.9 % (FLUSH) 0.9 %
10 SYRINGE (ML) INJECTION
Status: CANCELLED | OUTPATIENT
Start: 2023-10-12

## 2023-10-05 RX ORDER — SODIUM CHLORIDE 0.9 % (FLUSH) 0.9 %
10 SYRINGE (ML) INJECTION
Status: DISCONTINUED | OUTPATIENT
Start: 2023-10-05 | End: 2023-10-05 | Stop reason: HOSPADM

## 2023-10-05 RX ORDER — DIPHENHYDRAMINE HYDROCHLORIDE 50 MG/ML
50 INJECTION INTRAMUSCULAR; INTRAVENOUS ONCE AS NEEDED
Status: CANCELLED | OUTPATIENT
Start: 2023-10-12

## 2023-10-05 RX ORDER — HEPARIN 100 UNIT/ML
500 SYRINGE INTRAVENOUS
Status: DISCONTINUED | OUTPATIENT
Start: 2023-10-05 | End: 2023-10-05 | Stop reason: HOSPADM

## 2023-10-05 RX ORDER — DIPHENHYDRAMINE HYDROCHLORIDE 50 MG/ML
25 INJECTION INTRAMUSCULAR; INTRAVENOUS
Status: CANCELLED
Start: 2023-10-12

## 2023-10-05 RX ORDER — HEPARIN 100 UNIT/ML
500 SYRINGE INTRAVENOUS
Status: CANCELLED | OUTPATIENT
Start: 2023-10-12

## 2023-10-05 RX ORDER — EPINEPHRINE 0.3 MG/.3ML
0.3 INJECTION SUBCUTANEOUS ONCE AS NEEDED
Status: CANCELLED | OUTPATIENT
Start: 2023-10-12

## 2023-10-05 RX ADMIN — IRON SUCROSE 100 MG: 20 INJECTION, SOLUTION INTRAVENOUS at 02:10

## 2023-10-05 RX ADMIN — SODIUM CHLORIDE, PRESERVATIVE FREE 10 ML: 5 INJECTION INTRAVENOUS at 04:10

## 2023-10-05 RX ADMIN — HEPARIN 500 UNITS: 100 SYRINGE at 04:10

## 2023-10-05 RX ADMIN — DIPHENHYDRAMINE HYDROCHLORIDE 25 MG: 50 INJECTION INTRAMUSCULAR; INTRAVENOUS at 02:10

## 2023-10-05 NOTE — PROGRESS NOTES
Acupuncture Evaluation Note     Name: Cyrus Batres Jr.  Clinic Number: [id number]    Traditional Chinese Medicine (TCM) Diagnosis: Qi Stagnation  Medical Diagnosis:   Encounter Diagnosis   Name Primary?    Chronic low back pain, unspecified back pain laterality, unspecified whether sciatica present Yes          Evaluation Date: 10/5/23    Visit #/Visits authorized: 2/12     Precautions: Standard and Immunosuppression    Subjective     Chief Concern: Phantom tongue pain. 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 Non-verbal but writes on ipad etc., indicates he has some back pain and phantom tongue pain and mouth pain. No swelling inside mouth reported.    Medical necessity is demonstrated by the following IMPAIRMENTS: Medical Necessity: Cancer related pain, Decreased mobility limits day to day activities, social, and emergent situations, and Decreased quality of life              Aggravating Factors:  movement   Relieving Factors:  nothing    Symptom Description:     Quality:  Aching  Severity:  8 or varies but high pain  Frequency:  continuously    Objective       Pulse:        wiry       Treatment       Acupuncture points used:  K3/UB 62, Du20-24, Lv 5, Ht8, LI 4  Needles In: 12  Needles Out: 12  Needles W/O STIM placed: 3:45pm  [place] W/O STIM removed: 4:30pm      Assessment     After treatment, patient felt more relaxed    Patient prognosis is Fair.     Patient will continue to benefit from acupuncture treatment to address the deficits listed in the problem list box on initial evaluation, provide patient family education and to maximize pt's level of independence in the home and community environment.     Patient's spiritual, cultural and educational needs considered and pt agreeable to plan of care and goals.     Anticipated barriers to treatment: none    Plan     Recommend every couple weeks for 12 sessions with midway re-assess.      Education:  Patient is aware of cumulative benefit of  acupuncture

## 2023-10-05 NOTE — PLAN OF CARE
Problem: Anemia  Goal: Anemia Symptom Improvement  10/5/2023 1414 by Elisabeth Rush, RN  Outcome: Met  10/5/2023 1414 by Elisabeth Rush, RN  Outcome: Ongoing, Progressing

## 2023-10-12 ENCOUNTER — INFUSION (OUTPATIENT)
Dept: INFUSION THERAPY | Facility: HOSPITAL | Age: 73
End: 2023-10-12
Attending: INTERNAL MEDICINE
Payer: MEDICARE

## 2023-10-12 VITALS
OXYGEN SATURATION: 97 % | HEIGHT: 66 IN | TEMPERATURE: 98 F | RESPIRATION RATE: 18 BRPM | DIASTOLIC BLOOD PRESSURE: 62 MMHG | WEIGHT: 145.81 LBS | HEART RATE: 69 BPM | SYSTOLIC BLOOD PRESSURE: 113 MMHG | BODY MASS INDEX: 23.43 KG/M2

## 2023-10-12 DIAGNOSIS — D50.9 IRON DEFICIENCY ANEMIA, UNSPECIFIED IRON DEFICIENCY ANEMIA TYPE: Primary | ICD-10-CM

## 2023-10-12 PROCEDURE — 96367 TX/PROPH/DG ADDL SEQ IV INF: CPT

## 2023-10-12 PROCEDURE — 25000003 PHARM REV CODE 250: Performed by: INTERNAL MEDICINE

## 2023-10-12 PROCEDURE — 96365 THER/PROPH/DIAG IV INF INIT: CPT

## 2023-10-12 PROCEDURE — 63600175 PHARM REV CODE 636 W HCPCS: Performed by: INTERNAL MEDICINE

## 2023-10-12 RX ORDER — EPINEPHRINE 0.3 MG/.3ML
0.3 INJECTION SUBCUTANEOUS ONCE AS NEEDED
Status: CANCELLED | OUTPATIENT
Start: 2023-10-19

## 2023-10-12 RX ORDER — DIPHENHYDRAMINE HYDROCHLORIDE 50 MG/ML
50 INJECTION INTRAMUSCULAR; INTRAVENOUS ONCE AS NEEDED
Status: CANCELLED | OUTPATIENT
Start: 2023-10-19

## 2023-10-12 RX ORDER — HEPARIN 100 UNIT/ML
500 SYRINGE INTRAVENOUS
Status: DISCONTINUED | OUTPATIENT
Start: 2023-10-12 | End: 2023-10-12 | Stop reason: HOSPADM

## 2023-10-12 RX ORDER — SODIUM CHLORIDE 0.9 % (FLUSH) 0.9 %
10 SYRINGE (ML) INJECTION
Status: CANCELLED | OUTPATIENT
Start: 2023-10-19

## 2023-10-12 RX ORDER — DIPHENHYDRAMINE HYDROCHLORIDE 50 MG/ML
25 INJECTION INTRAMUSCULAR; INTRAVENOUS
Status: CANCELLED
Start: 2023-10-19

## 2023-10-12 RX ORDER — HEPARIN 100 UNIT/ML
500 SYRINGE INTRAVENOUS
Status: CANCELLED | OUTPATIENT
Start: 2023-10-19

## 2023-10-12 RX ADMIN — DIPHENHYDRAMINE HYDROCHLORIDE 25 MG: 50 INJECTION, SOLUTION INTRAMUSCULAR; INTRAVENOUS at 02:10

## 2023-10-12 RX ADMIN — IRON SUCROSE 100 MG: 20 INJECTION, SOLUTION INTRAVENOUS at 02:10

## 2023-10-12 RX ADMIN — SODIUM CHLORIDE: 0.9 INJECTION, SOLUTION INTRAVENOUS at 02:10

## 2023-10-12 RX ADMIN — HEPARIN 500 UNITS: 100 SYRINGE at 04:10

## 2023-10-12 NOTE — PLAN OF CARE
Problem: Fall Injury Risk  Goal: Absence of Fall and Fall-Related Injury  Outcome: Ongoing, Progressing  Intervention: Identify and Manage Contributors  Flowsheets (Taken 10/12/2023 1405)  Self-Care Promotion: safe use of adaptive equipment encouraged  Medication Review/Management: medications reviewed  Intervention: Promote Injury-Free Environment  Flowsheets (Taken 10/12/2023 1405)  Safety Promotion/Fall Prevention: assistive device/personal item within reach

## 2023-10-13 ENCOUNTER — LAB VISIT (OUTPATIENT)
Dept: LAB | Facility: HOSPITAL | Age: 73
End: 2023-10-13
Attending: NURSE PRACTITIONER
Payer: MEDICARE

## 2023-10-13 DIAGNOSIS — C01 MALIGNANT NEOPLASM OF BASE OF TONGUE: ICD-10-CM

## 2023-10-13 LAB
ALBUMIN SERPL BCP-MCNC: 4.4 G/DL (ref 3.5–5.2)
ALP SERPL-CCNC: 144 U/L (ref 55–135)
ALT SERPL W/O P-5'-P-CCNC: 28 U/L (ref 10–44)
ANION GAP SERPL CALC-SCNC: 7 MMOL/L (ref 8–16)
AST SERPL-CCNC: 27 U/L (ref 10–40)
BASOPHILS # BLD AUTO: 0.01 K/UL (ref 0–0.2)
BASOPHILS NFR BLD: 0.2 % (ref 0–1.9)
BILIRUB SERPL-MCNC: 0.9 MG/DL (ref 0.1–1)
BUN SERPL-MCNC: 23 MG/DL (ref 8–23)
CALCIUM SERPL-MCNC: 8.9 MG/DL (ref 8.7–10.5)
CHLORIDE SERPL-SCNC: 97 MMOL/L (ref 95–110)
CO2 SERPL-SCNC: 29 MMOL/L (ref 23–29)
CREAT SERPL-MCNC: 1 MG/DL (ref 0.5–1.4)
DIFFERENTIAL METHOD: ABNORMAL
EOSINOPHIL # BLD AUTO: 0.3 K/UL (ref 0–0.5)
EOSINOPHIL NFR BLD: 4.7 % (ref 0–8)
ERYTHROCYTE [DISTWIDTH] IN BLOOD BY AUTOMATED COUNT: 13.1 % (ref 11.5–14.5)
EST. GFR  (NO RACE VARIABLE): >60 ML/MIN/1.73 M^2
GLUCOSE SERPL-MCNC: 126 MG/DL (ref 70–110)
HCT VFR BLD AUTO: 35.8 % (ref 40–54)
HGB BLD-MCNC: 12 G/DL (ref 14–18)
IMM GRANULOCYTES # BLD AUTO: 0.03 K/UL (ref 0–0.04)
IMM GRANULOCYTES NFR BLD AUTO: 0.6 % (ref 0–0.5)
LYMPHOCYTES # BLD AUTO: 0.7 K/UL (ref 1–4.8)
LYMPHOCYTES NFR BLD: 13 % (ref 18–48)
MCH RBC QN AUTO: 31.3 PG (ref 27–31)
MCHC RBC AUTO-ENTMCNC: 33.5 G/DL (ref 32–36)
MCV RBC AUTO: 94 FL (ref 82–98)
MONOCYTES # BLD AUTO: 0.4 K/UL (ref 0.3–1)
MONOCYTES NFR BLD: 7.5 % (ref 4–15)
NEUTROPHILS # BLD AUTO: 3.9 K/UL (ref 1.8–7.7)
NEUTROPHILS NFR BLD: 74 % (ref 38–73)
NRBC BLD-RTO: 0 /100 WBC
PLATELET # BLD AUTO: 135 K/UL (ref 150–450)
PMV BLD AUTO: 12.1 FL (ref 9.2–12.9)
POTASSIUM SERPL-SCNC: 4 MMOL/L (ref 3.5–5.1)
PROT SERPL-MCNC: 7.1 G/DL (ref 6–8.4)
RBC # BLD AUTO: 3.83 M/UL (ref 4.6–6.2)
SODIUM SERPL-SCNC: 133 MMOL/L (ref 136–145)
WBC # BLD AUTO: 5.31 K/UL (ref 3.9–12.7)

## 2023-10-13 PROCEDURE — 36415 COLL VENOUS BLD VENIPUNCTURE: CPT | Performed by: NURSE PRACTITIONER

## 2023-10-13 PROCEDURE — 80053 COMPREHEN METABOLIC PANEL: CPT | Performed by: NURSE PRACTITIONER

## 2023-10-13 PROCEDURE — 85025 COMPLETE CBC W/AUTO DIFF WBC: CPT | Performed by: NURSE PRACTITIONER

## 2023-10-16 NOTE — DISCHARGE INSTRUCTIONS
Before leaving, please make sure you have all your personal belongings such as glasses, purses, wallets, keys, cell phones, jewelry, jackets etc    Discharge Instructions for Cataract Surgery    USE DROPS AS INSTRUCTED: Start upon arrival at home    PREDNISOLONE  ONE DROP 4 TIMES A DAY TO LEFT EYE  Moxifloxacin ONE DROP 4 TIMES A DAY TO LEFT EYE  KETOROLAC ONE DROP 4 TIMES A DAY TO LEFT EYE    WAIT 2 MINUTES BETWEEN DROPS     BRING ALL EYE DROPS TO YOUR CLINIC VISITS    Wear sunglasses during the day  Do not sleep on the affected side and wear eye shield while sleeping for 2 weeks.  No exercise or lifting greater than 10 lbs for 2 weeks.  Try not to cough. If coughing a lot, take a cough suppressent  Do not bend over for 2 weeks.  Keep water it of your eye for 2 weeks.             Wear sunglasses for comfort as needed.  It is normal to feel a slight irritation, like there is an eyelash in the eye that had surgery.   You may take Tylenol for discomfort but if pain intensifies , call Dr Munoz  [phone]    If you use eye drops for glaucoma you may continue to use them.      After Surgery:  Always be aware that any surgery can cause these symptoms:    Pain- Medication can be prescribed for pain to decrease your pain but may not completely take your pain away.  Over the Counter pain medicine my be enough and you can always use Ice and rest to help ease pain.    Bleeding- a little bleeding after a surgery is usually within normal.  If there is a lot of blood you need to notify your MD.  Emergency treatments of bleeding are cold application, elevation of the bleeding site and compression.    Infection- Infection after surgery is NOT a normal occurrence.  Signs of infection are fever, swelling, hot to touch the incision.  If this occurs notify your MD immediately.    Nausea- this can be common after a surgery especially if you have had anesthesia medicine or are taking pain medicine.  Staying on clear liquids, bland  foods, gingerale, or over the counter anti nausea medicines can help.  If you vomit more than once, notify your MD.  Anti Nausea medicines can be prescribed.

## 2023-10-17 ENCOUNTER — CLINICAL SUPPORT (OUTPATIENT)
Dept: REHABILITATION | Facility: HOSPITAL | Age: 73
End: 2023-10-17
Payer: MEDICARE

## 2023-10-17 DIAGNOSIS — G89.29 CHRONIC LOW BACK PAIN, UNSPECIFIED BACK PAIN LATERALITY, UNSPECIFIED WHETHER SCIATICA PRESENT: ICD-10-CM

## 2023-10-17 DIAGNOSIS — G54.8 PHANTOM PAIN: Primary | ICD-10-CM

## 2023-10-17 DIAGNOSIS — M54.50 CHRONIC LOW BACK PAIN, UNSPECIFIED BACK PAIN LATERALITY, UNSPECIFIED WHETHER SCIATICA PRESENT: ICD-10-CM

## 2023-10-17 DIAGNOSIS — R52 PHANTOM PAIN: Primary | ICD-10-CM

## 2023-10-17 PROCEDURE — 97810 ACUP 1/> WO ESTIM 1ST 15 MIN: CPT | Mod: PN

## 2023-10-17 PROCEDURE — 97811 ACUP 1/> W/O ESTIM EA ADD 15: CPT | Mod: PN

## 2023-10-17 NOTE — PROGRESS NOTES
Acupuncture Evaluation Note     Name: Cyrus Batres Jr.  Clinic Number: [id number]    Traditional Chinese Medicine (TCM) Diagnosis: Qi Stagnation  Medical Diagnosis:   Encounter Diagnoses   Name Primary?    Phantom pain Yes    Chronic low back pain, unspecified back pain laterality, unspecified whether sciatica present           Evaluation Date: 10/17/23    Visit #/Visits authorized: 3/12     Precautions: Standard and Immunosuppression    Subjective     Chief Concern: Phantom tongue pain. 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 Non-verbal but writes on ipad etc., indicates he has some back pain and phantom tongue pain and mouth pain. No swelling inside mouth reported.    Medical necessity is demonstrated by the following IMPAIRMENTS: Medical Necessity: Cancer related pain, Decreased mobility limits day to day activities, social, and emergent situations, and Decreased quality of life              Aggravating Factors:  movement   Relieving Factors:  nothing    Symptom Description:     Quality:  Aching  Severity:  8 or varies but high pain  Frequency:  continuously    Objective       Pulse:        wiry       Treatment       Acupuncture points used:  K3/UB 62, Du20-24, Lv 5, Ht8, LI 4  Needles In: 12  Needles Out: 12  Needles W/O STIM placed: 4pm  [place] W/O STIM removed: 4:45pm      Assessment     After treatment, patient felt more relaxed    Patient prognosis is Fair.     Patient will continue to benefit from acupuncture treatment to address the deficits listed in the problem list box on initial evaluation, provide patient family education and to maximize pt's level of independence in the home and community environment.     Patient's spiritual, cultural and educational needs considered and pt agreeable to plan of care and goals.     Anticipated barriers to treatment: none    Plan     Recommend every couple weeks for 12 sessions with midway re-assess.      Education:  Patient is aware of cumulative  benefit of acupuncture

## 2023-10-18 ENCOUNTER — ANESTHESIA EVENT (OUTPATIENT)
Dept: SURGERY | Facility: HOSPITAL | Age: 73
End: 2023-10-18
Payer: MEDICARE

## 2023-10-18 ENCOUNTER — HOSPITAL ENCOUNTER (OUTPATIENT)
Facility: HOSPITAL | Age: 73
Discharge: HOME | End: 2023-10-18
Attending: OPHTHALMOLOGY | Admitting: OPHTHALMOLOGY
Payer: MEDICARE

## 2023-10-18 ENCOUNTER — ANESTHESIA (OUTPATIENT)
Dept: SURGERY | Facility: HOSPITAL | Age: 73
End: 2023-10-18
Payer: MEDICARE

## 2023-10-18 DIAGNOSIS — Z98.41 STATUS POST CATARACT SURGERY, RIGHT: ICD-10-CM

## 2023-10-18 DIAGNOSIS — H25.812 COMBINED FORM OF AGE-RELATED CATARACT, LEFT EYE: ICD-10-CM

## 2023-10-18 LAB — POCT GLUCOSE: 101 MG/DL (ref 70–110)

## 2023-10-18 PROCEDURE — 63600175 PHARM REV CODE 636 W HCPCS: Performed by: ANESTHESIOLOGY

## 2023-10-18 PROCEDURE — 63600175 PHARM REV CODE 636 W HCPCS: Performed by: NURSE ANESTHETIST, CERTIFIED REGISTERED

## 2023-10-18 PROCEDURE — 25000003 PHARM REV CODE 250: Performed by: ANESTHESIOLOGY

## 2023-10-18 PROCEDURE — D9220A PRA ANESTHESIA: ICD-10-PCS | Mod: CRNA,,, | Performed by: NURSE ANESTHETIST, CERTIFIED REGISTERED

## 2023-10-18 PROCEDURE — 37000009 HC ANESTHESIA EA ADD 15 MINS: Performed by: OPHTHALMOLOGY

## 2023-10-18 PROCEDURE — 25000003 PHARM REV CODE 250: Performed by: OPHTHALMOLOGY

## 2023-10-18 PROCEDURE — 63600175 PHARM REV CODE 636 W HCPCS: Performed by: OPHTHALMOLOGY

## 2023-10-18 PROCEDURE — D9220A PRA ANESTHESIA: Mod: CRNA,,, | Performed by: NURSE ANESTHETIST, CERTIFIED REGISTERED

## 2023-10-18 PROCEDURE — D9220A PRA ANESTHESIA: Mod: ANES,,, | Performed by: ANESTHESIOLOGY

## 2023-10-18 PROCEDURE — 71000033 HC RECOVERY, INTIAL HOUR: Performed by: OPHTHALMOLOGY

## 2023-10-18 PROCEDURE — 66984 XCAPSL CTRC RMVL W/O ECP: CPT | Mod: 79,LT,, | Performed by: OPHTHALMOLOGY

## 2023-10-18 PROCEDURE — 36000707: Performed by: OPHTHALMOLOGY

## 2023-10-18 PROCEDURE — 37000008 HC ANESTHESIA 1ST 15 MINUTES: Performed by: OPHTHALMOLOGY

## 2023-10-18 PROCEDURE — 66984 PR REMOVAL, CATARACT, W/INSRT INTRAOC LENS, W/O ENDO CYCLO: ICD-10-PCS | Mod: 79,LT,, | Performed by: OPHTHALMOLOGY

## 2023-10-18 PROCEDURE — 36000706: Performed by: OPHTHALMOLOGY

## 2023-10-18 PROCEDURE — D9220A PRA ANESTHESIA: ICD-10-PCS | Mod: ANES,,, | Performed by: ANESTHESIOLOGY

## 2023-10-18 PROCEDURE — V2632 POST CHMBR INTRAOCULAR LENS: HCPCS | Performed by: OPHTHALMOLOGY

## 2023-10-18 DEVICE — TECNIS SMPLCTY TECNIS 1PC CLR MONO 20.0D
Type: IMPLANTABLE DEVICE | Site: EYE | Status: FUNCTIONAL
Brand: TECNIS SIMPLICITY

## 2023-10-18 RX ORDER — EPINEPHRINE 1 MG/ML
INJECTION, SOLUTION, CONCENTRATE INTRAVENOUS
Status: DISCONTINUED | OUTPATIENT
Start: 2023-10-18 | End: 2023-10-18 | Stop reason: HOSPADM

## 2023-10-18 RX ORDER — MEPERIDINE HYDROCHLORIDE 50 MG/ML
12.5 INJECTION INTRAMUSCULAR; INTRAVENOUS; SUBCUTANEOUS ONCE AS NEEDED
Status: DISCONTINUED | OUTPATIENT
Start: 2023-10-18 | End: 2023-10-18 | Stop reason: HOSPADM

## 2023-10-18 RX ORDER — DIPHENHYDRAMINE HYDROCHLORIDE 50 MG/ML
12.5 INJECTION INTRAMUSCULAR; INTRAVENOUS EVERY 6 HOURS PRN
Status: DISCONTINUED | OUTPATIENT
Start: 2023-10-18 | End: 2023-10-18 | Stop reason: HOSPADM

## 2023-10-18 RX ORDER — OXYCODONE HYDROCHLORIDE 5 MG/1
5 TABLET ORAL
Status: DISCONTINUED | OUTPATIENT
Start: 2023-10-18 | End: 2023-10-18 | Stop reason: HOSPADM

## 2023-10-18 RX ORDER — SODIUM CHLORIDE 9 MG/ML
INJECTION, SOLUTION INTRAVENOUS CONTINUOUS
Status: DISCONTINUED | OUTPATIENT
Start: 2023-10-18 | End: 2023-10-18 | Stop reason: HOSPADM

## 2023-10-18 RX ORDER — MOXIFLOXACIN 5 MG/ML
SOLUTION/ DROPS OPHTHALMIC
Status: DISCONTINUED | OUTPATIENT
Start: 2023-10-18 | End: 2023-10-18 | Stop reason: HOSPADM

## 2023-10-18 RX ORDER — PHENYLEPHRINE HYDROCHLORIDE 25 MG/ML
1 SOLUTION/ DROPS OPHTHALMIC
Status: DISCONTINUED | OUTPATIENT
Start: 2023-10-18 | End: 2023-10-18 | Stop reason: HOSPADM

## 2023-10-18 RX ORDER — TETRACAINE HYDROCHLORIDE 5 MG/ML
SOLUTION OPHTHALMIC
Status: DISCONTINUED | OUTPATIENT
Start: 2023-10-18 | End: 2023-10-18 | Stop reason: HOSPADM

## 2023-10-18 RX ORDER — PROPARACAINE HYDROCHLORIDE 5 MG/ML
1 SOLUTION/ DROPS OPHTHALMIC
Status: DISCONTINUED | OUTPATIENT
Start: 2023-10-18 | End: 2023-10-18 | Stop reason: HOSPADM

## 2023-10-18 RX ORDER — LIDOCAINE HYDROCHLORIDE 10 MG/ML
1 INJECTION, SOLUTION EPIDURAL; INFILTRATION; INTRACAUDAL; PERINEURAL ONCE
Status: COMPLETED | OUTPATIENT
Start: 2023-10-18 | End: 2023-10-18

## 2023-10-18 RX ORDER — LIDOCAINE HYDROCHLORIDE 40 MG/ML
INJECTION, SOLUTION RETROBULBAR
Status: DISCONTINUED | OUTPATIENT
Start: 2023-10-18 | End: 2023-10-18 | Stop reason: HOSPADM

## 2023-10-18 RX ORDER — ONDANSETRON HYDROCHLORIDE 2 MG/ML
INJECTION, SOLUTION INTRAMUSCULAR; INTRAVENOUS
Status: DISCONTINUED | OUTPATIENT
Start: 2023-10-18 | End: 2023-10-18

## 2023-10-18 RX ORDER — FENTANYL CITRATE 50 UG/ML
25 INJECTION, SOLUTION INTRAMUSCULAR; INTRAVENOUS EVERY 5 MIN PRN
Status: DISCONTINUED | OUTPATIENT
Start: 2023-10-18 | End: 2023-10-18 | Stop reason: HOSPADM

## 2023-10-18 RX ORDER — TROPICAMIDE 10 MG/ML
1 SOLUTION/ DROPS OPHTHALMIC
Status: DISCONTINUED | OUTPATIENT
Start: 2023-10-18 | End: 2023-10-18 | Stop reason: HOSPADM

## 2023-10-18 RX ORDER — ONDANSETRON 2 MG/ML
4 INJECTION INTRAMUSCULAR; INTRAVENOUS ONCE AS NEEDED
Status: DISCONTINUED | OUTPATIENT
Start: 2023-10-18 | End: 2023-10-18 | Stop reason: HOSPADM

## 2023-10-18 RX ORDER — HYDROMORPHONE HYDROCHLORIDE 1 MG/ML
0.2 INJECTION, SOLUTION INTRAMUSCULAR; INTRAVENOUS; SUBCUTANEOUS EVERY 5 MIN PRN
Status: DISCONTINUED | OUTPATIENT
Start: 2023-10-18 | End: 2023-10-18 | Stop reason: HOSPADM

## 2023-10-18 RX ORDER — MIDAZOLAM HYDROCHLORIDE 1 MG/ML
INJECTION INTRAMUSCULAR; INTRAVENOUS
Status: DISCONTINUED | OUTPATIENT
Start: 2023-10-18 | End: 2023-10-18

## 2023-10-18 RX ORDER — SODIUM CHLORIDE, SODIUM LACTATE, POTASSIUM CHLORIDE, CALCIUM CHLORIDE 600; 310; 30; 20 MG/100ML; MG/100ML; MG/100ML; MG/100ML
INJECTION, SOLUTION INTRAVENOUS CONTINUOUS
Status: DISCONTINUED | OUTPATIENT
Start: 2023-10-18 | End: 2023-10-18 | Stop reason: HOSPADM

## 2023-10-18 RX ADMIN — PROPARACAINE HYDROCHLORIDE 1 DROP: 5 SOLUTION/ DROPS OPHTHALMIC at 10:10

## 2023-10-18 RX ADMIN — PHENYLEPHRINE HYDROCHLORIDE 1 DROP: 25 SOLUTION/ DROPS OPHTHALMIC at 10:10

## 2023-10-18 RX ADMIN — TROPICAMIDE 1 DROP: 10 SOLUTION/ DROPS OPHTHALMIC at 10:10

## 2023-10-18 RX ADMIN — ONDANSETRON 4 MG: 2 INJECTION INTRAMUSCULAR; INTRAVENOUS at 10:10

## 2023-10-18 RX ADMIN — SODIUM CHLORIDE, POTASSIUM CHLORIDE, SODIUM LACTATE AND CALCIUM CHLORIDE: 600; 310; 30; 20 INJECTION, SOLUTION INTRAVENOUS at 10:10

## 2023-10-18 RX ADMIN — MIDAZOLAM HYDROCHLORIDE 2 MG: 1 INJECTION, SOLUTION INTRAMUSCULAR; INTRAVENOUS at 10:10

## 2023-10-18 RX ADMIN — LIDOCAINE HYDROCHLORIDE 5 MG: 10 SOLUTION INTRAVENOUS at 10:10

## 2023-10-18 NOTE — OP NOTE
Operative Date:  10/18/2023    Discharge Date:  10/18/2023    Report Title: Operative Note    SURGEON: Stoney Munoz MD    ASSISTANT: None    PREOPERATIVE DIAGNOSIS: Combined form of senile cataract,  Left Eye    POSTOPERATIVE DIAGNOSIS: same    PROCEDURE PERFORMED: Phacoemulsification of the cataract with posterior chamber intraocular lens Left Eye    IMPLANTS: DCBOO 20.0    ANESTHESIA:  Topical with MAC    COMPLICATIONS: None    ESTIMATED BLOOD LOSS: Minimal    PROCEDURE: The patient was brought to the operating room, time out was performed and implant checked.  The patient was given light sedation, and topical anesthesia was instilled in the left eye.  The left eye was prepped and draped in the usual fashion for eye surgery and lid speculum used to retract the eyelid. The eyelashes were secluded within the drape.  A paracentesis was made inferiorly with a sideport blade. Epishugarcaine was injected in the anterior chamber and dispersive viscoelastic was injected into the anterior chamber. A temporal corneal incision was made with a steel keratome. A cystitome was used to initiate a continuous curvilinear capsulorrhexis and completed using the capsulorrhexis forceps. Hydrodissection of the lens nucleus was performed using balanced salt solution (BSS) on hydrodissection cannula. The lens nucleus was removed using phacoemulsification in the modified stop and chop technique. The lens cortex was removed using the irrigation/aspiration handpiece. The capsular bag was filled with viscoelastic, and the intraocular lens was injected into the capsular bag under direct visualization. Viscoelastic was removed using the irrigation/aspiration handpiece. The wounds were hydrated until watertight.    The wounds were rechecked and no leakage was noted.  The speculum was removed. Topical antibiotic was applied to the eye and shield was placed over the eye. The patient tolerated the procedure well and left the operating room in  good condition.

## 2023-10-18 NOTE — DISCHARGE SUMMARY
[facility] - Periop Services  Discharge Note  Short Stay    Procedure(s) (LRB):  CEIOL OS npo peg (Left)      OUTCOME: Patient tolerated treatment/procedure well without complication and is now ready for discharge.    DISPOSITION: Home or Self Care    FINAL DIAGNOSIS:  cataract    FOLLOWUP: In clinic    DISCHARGE INSTRUCTIONS:  No discharge procedures on file.     TIME SPENT ON DISCHARGE: 5 minutes

## 2023-10-18 NOTE — ANESTHESIA POSTPROCEDURE EVALUATION
Anesthesia Post Evaluation    Patient: Cyrus Batres Jr.    Procedure(s) Performed: Procedure(s) (LRB):  CEIOL OS npo peg (Left)    Final Anesthesia Type: MAC      Patient location during evaluation: PACU  Patient participation: Yes- Able to Participate  Level of consciousness: awake and alert and oriented  Post-procedure vital signs: reviewed and stable  Pain management: adequate  Airway patency: patent    PONV status at discharge: No PONV  Anesthetic complications: no      Cardiovascular status: blood pressure returned to baseline and stable  Respiratory status: unassisted and spontaneous ventilation  Hydration status: euvolemic  Follow-up not needed.          Vitals Value Taken Time   /61 10/18/23 1148   Temp 36.5 °C (97.7 °F) 10/18/23 1128   Pulse 53 10/18/23 1148   Resp 18 10/18/23 1148   SpO2 99 % 10/18/23 1148         No case tracking events are documented in the log.      Pain/Alexi Score: Alexi Score: 10 (10/18/2023 11:28 AM)

## 2023-10-18 NOTE — PLAN OF CARE
Patient is awake alert and says she is ready to go home; his sister in law says she is ready to take patient home and she is driving. Patient's vital signs and left eye stable. Patient is wearing provided sunglasses. Patient denies pain, nausea, weakness or dizziness. All patient's belongings have been returned to patient. Patient is in stable condition and being discharged via wheelchair to car his sister in law is driving.

## 2023-10-18 NOTE — H&P
History    Chief complaint:  Painless progressive vision loss left Eye    Present Ilness/Diagnosis: Visually significant cataract, left Eye    ROS: +Eyes, otherwise no significant changes    Past Medical History: refer to chart    Family History/Social History: refer to chart    Allergies:   Review of patient's allergies indicates:   Allergen Reactions    Lovastatin Itching    Pollen extracts     Lovastatin Rash     Not confirmed but pt skeptical       Current Medications: see medcard      Physical Exam    BP: Vital signs stable  General: No apparent distress  HEENT: cataract  Lungs: adequate respirations  Heart: + pulses  Abdomen: soft  Rectal/pelvic: deferred    Labs: Labs Reviewed    Lab Results   Component Value Date    WBC 5.31 10/13/2023    HGB 12.0 (L) 10/13/2023    HCT 35.8 (L) 10/13/2023    MCV 94 10/13/2023     (L) 10/13/2023           CMP  Sodium   Date Value Ref Range Status   10/13/2023 133 (L) 136 - 145 mmol/L Final     Potassium   Date Value Ref Range Status   10/13/2023 4.0 3.5 - 5.1 mmol/L Final     Chloride   Date Value Ref Range Status   10/13/2023 97 95 - 110 mmol/L Final     CO2   Date Value Ref Range Status   10/13/2023 29 23 - 29 mmol/L Final     Glucose   Date Value Ref Range Status   10/13/2023 126 (H) 70 - 110 mg/dL Final     BUN   Date Value Ref Range Status   10/13/2023 23 8 - 23 mg/dL Final     Creatinine   Date Value Ref Range Status   10/13/2023 1.0 0.5 - 1.4 mg/dL Final     Calcium   Date Value Ref Range Status   10/13/2023 8.9 8.7 - 10.5 mg/dL Final     Total Protein   Date Value Ref Range Status   10/13/2023 7.1 6.0 - 8.4 g/dL Final     Albumin   Date Value Ref Range Status   10/13/2023 4.4 3.5 - 5.2 g/dL Final   11/18/2020 3.7 3.6 - 5.1 g/dL Final     Comment:     For additional information, please refer to   http://education.[employer]/faq/MEO867 (This link is   being provided for informational/ educational purposes only.)  This test was developed and its  analytical performance   characteristics have been determined by [facility]. It has not been cleared or approved by the   [place] Food and Drug Administration. This assay has been validated   pursuant to the CLIA regulations and is used for clinical   purposes.  @ Test Performed By:  [employer] [facility]  Yo Cortes M.D.,   [id number] [place] [address]  CLIA  [id number]       Total Bilirubin   Date Value Ref Range Status   10/13/2023 0.9 0.1 - 1.0 mg/dL Final     Comment:     For infants and newborns, interpretation of results should be based  on gestational age, weight and in agreement with clinical  observations.    Premature Infant recommended reference ranges:  Up to 24 hours.............<8.0 mg/dL  Up to 48 hours............<12.0 mg/dL  3-5 days..................<15.0 mg/dL  6-29 days.................<15.0 mg/dL       Alkaline Phosphatase   Date Value Ref Range Status   10/13/2023 144 (H) 55 - 135 U/L Final     AST   Date Value Ref Range Status   10/13/2023 27 10 - 40 U/L Final     ALT   Date Value Ref Range Status   10/13/2023 28 10 - 44 U/L Final     Anion Gap   Date Value Ref Range Status   10/13/2023 7 (L) 8 - 16 mmol/L Final     eGFR   Date Value Ref Range Status   10/13/2023 >60.0 >60 mL/min/1.73 m^2 Final       The patient has been cleared for surgery in an ambulatory surgery facility.     Impression: Visually significant Cataract left Eye    Plan: Phacoemulsification with implantation of Intraocular lens left Eye

## 2023-10-18 NOTE — ANESTHESIA PREPROCEDURE EVALUATION
10/18/2023  Cyrus Batres Jr. is a 73 y.o., male.      Pre-op Assessment    I have reviewed the Patient Summary Reports.     I have reviewed the Nursing Notes. I have reviewed the NPO Status.   I have reviewed the Medications.     Review of Systems  EENT/Dental:   Tracheostomy for laryngeal CA   Cardiovascular:   Hypertension Dysrhythmias atrial fibrillation GRIFFIN ECG has been reviewed. EKG - NSR with sinus arrhythmia. PAF;    ? The left ventricle is normal in size with normal systolic function.  ? The estimated ejection fraction is 65%.  ? Normal left ventricular diastolic function.  ? Normal right ventricular size with normal right ventricular systolic function.  ? Mild-to-moderate mitral regurgitation.  ? Mild pulmonic regurgitation.  ? Normal central venous pressure (3 mmHg).  ? The estimated PA systolic pressure is 21 mmHg.  ? Trivial posterior pericardial effusion. Just at base   Pulmonary:   Shortness of breath    Hepatic/GI:   GERD Liver Disease,    Neurological:   Neuromuscular Disease,    Endocrine:   Diabetes Hypothyroidism        Physical Exam    Airway:  Pre-Existing Airway: Tracheal Stoma        Anesthesia Plan  Type of Anesthesia, risks & benefits discussed:    Anesthesia Type: MAC  Intra-op Monitoring Plan: Standard ASA Monitors  Post Op Pain Control Plan: multimodal analgesia and IV/PO Opioids PRN  Induction:  IV  Informed Consent: Informed consent signed with the Patient and all parties understand the risks and agree with anesthesia plan.  All questions answered.   ASA Score: 3    Ready For Surgery From Anesthesia Perspective.     .

## 2023-10-18 NOTE — TRANSFER OF CARE
"Anesthesia Transfer of Care Note    Patient: Cyrus Batres Jr.    Procedure(s) Performed: Procedure(s) (LRB):  CEIOL OS npo peg (Left)    Patient location: PACU    Anesthesia Type: MAC    Transport from OR: Transported from OR on room air with adequate spontaneous ventilation    Post pain: adequate analgesia    Post assessment: no apparent anesthetic complications and tolerated procedure well    Post vital signs: stable    Level of consciousness: awake    Nausea/Vomiting: no nausea/vomiting    Transfer of care protocol was followed      Last vitals:   Visit Vitals  BP (!) 142/67 (BP Location: Left arm, Patient Position: Lying)   Pulse (!) 57   Temp 36.9 °C (98.4 °F) (Skin)   Resp 19   Ht 5' 6" (1.676 m)   Wt 65.8 kg (145 lb)   SpO2 100%   BMI 23.40 kg/m²     "

## 2023-10-19 ENCOUNTER — OFFICE VISIT (OUTPATIENT)
Dept: OPHTHALMOLOGY | Facility: CLINIC | Age: 73
End: 2023-10-19
Payer: MEDICARE

## 2023-10-19 VITALS
WEIGHT: 145 LBS | DIASTOLIC BLOOD PRESSURE: 61 MMHG | TEMPERATURE: 98 F | HEART RATE: 53 BPM | OXYGEN SATURATION: 99 % | HEIGHT: 66 IN | RESPIRATION RATE: 18 BRPM | SYSTOLIC BLOOD PRESSURE: 131 MMHG | BODY MASS INDEX: 23.3 KG/M2

## 2023-10-19 DIAGNOSIS — Z98.42 STATUS POST CATARACT SURGERY, LEFT: Primary | ICD-10-CM

## 2023-10-19 PROCEDURE — 1126F AMNT PAIN NOTED NONE PRSNT: CPT | Mod: CPTII,S$GLB,, | Performed by: OPHTHALMOLOGY

## 2023-10-19 PROCEDURE — 1126F PR PAIN SEVERITY QUANTIFIED, NO PAIN PRESENT: ICD-10-PCS | Mod: CPTII,S$GLB,, | Performed by: OPHTHALMOLOGY

## 2023-10-19 PROCEDURE — 1159F PR MEDICATION LIST DOCUMENTED IN MEDICAL RECORD: ICD-10-PCS | Mod: CPTII,S$GLB,, | Performed by: OPHTHALMOLOGY

## 2023-10-19 PROCEDURE — 99024 PR POST-OP FOLLOW-UP VISIT: ICD-10-PCS | Mod: S$GLB,,, | Performed by: OPHTHALMOLOGY

## 2023-10-19 PROCEDURE — 3044F HG A1C LEVEL LT 7.0%: CPT | Mod: CPTII,S$GLB,, | Performed by: OPHTHALMOLOGY

## 2023-10-19 PROCEDURE — 99024 POSTOP FOLLOW-UP VISIT: CPT | Mod: S$GLB,,, | Performed by: OPHTHALMOLOGY

## 2023-10-19 PROCEDURE — 1160F PR REVIEW ALL MEDS BY PRESCRIBER/CLIN PHARMACIST DOCUMENTED: ICD-10-PCS | Mod: CPTII,S$GLB,, | Performed by: OPHTHALMOLOGY

## 2023-10-19 PROCEDURE — 3061F NEG MICROALBUMINURIA REV: CPT | Mod: CPTII,S$GLB,, | Performed by: OPHTHALMOLOGY

## 2023-10-19 PROCEDURE — 3066F PR DOCUMENTATION OF TREATMENT FOR NEPHROPATHY: ICD-10-PCS | Mod: CPTII,S$GLB,, | Performed by: OPHTHALMOLOGY

## 2023-10-19 PROCEDURE — 3288F PR FALLS RISK ASSESSMENT DOCUMENTED: ICD-10-PCS | Mod: CPTII,S$GLB,, | Performed by: OPHTHALMOLOGY

## 2023-10-19 PROCEDURE — 1101F PR PT FALLS ASSESS DOC 0-1 FALLS W/OUT INJ PAST YR: ICD-10-PCS | Mod: CPTII,S$GLB,, | Performed by: OPHTHALMOLOGY

## 2023-10-19 PROCEDURE — 99999 PR PBB SHADOW E&M-EST. PATIENT-LVL III: ICD-10-PCS | Mod: PBBFAC,,, | Performed by: OPHTHALMOLOGY

## 2023-10-19 PROCEDURE — 3061F PR NEG MICROALBUMINURIA RESULT DOCUMENTED/REVIEW: ICD-10-PCS | Mod: CPTII,S$GLB,, | Performed by: OPHTHALMOLOGY

## 2023-10-19 PROCEDURE — 99999 PR PBB SHADOW E&M-EST. PATIENT-LVL III: CPT | Mod: PBBFAC,,, | Performed by: OPHTHALMOLOGY

## 2023-10-19 PROCEDURE — 1159F MED LIST DOCD IN RCRD: CPT | Mod: CPTII,S$GLB,, | Performed by: OPHTHALMOLOGY

## 2023-10-19 PROCEDURE — 1160F RVW MEDS BY RX/DR IN RCRD: CPT | Mod: CPTII,S$GLB,, | Performed by: OPHTHALMOLOGY

## 2023-10-19 PROCEDURE — 3044F PR MOST RECENT HEMOGLOBIN A1C LEVEL <7.0%: ICD-10-PCS | Mod: CPTII,S$GLB,, | Performed by: OPHTHALMOLOGY

## 2023-10-19 PROCEDURE — 1101F PT FALLS ASSESS-DOCD LE1/YR: CPT | Mod: CPTII,S$GLB,, | Performed by: OPHTHALMOLOGY

## 2023-10-19 PROCEDURE — 3288F FALL RISK ASSESSMENT DOCD: CPT | Mod: CPTII,S$GLB,, | Performed by: OPHTHALMOLOGY

## 2023-10-19 PROCEDURE — 3066F NEPHROPATHY DOC TX: CPT | Mod: CPTII,S$GLB,, | Performed by: OPHTHALMOLOGY

## 2023-10-19 NOTE — PROGRESS NOTES
HPI    Pt presents for one day post op PCIOL OS     States OS is doing well     Moxi QID   PF QID   Keto QID     Last edited by Dana Gibbons on 10/19/2023  8:44 AM.            Assessment /Plan     For exam results, see Encounter Report.    Status post cataract surgery, left      Doing well  Post op precautions and instructions reviewed, sheet given  moxi QID  PF QID  Keto qdaily    F/u 1 month, refract

## 2023-10-26 ENCOUNTER — INFUSION (OUTPATIENT)
Dept: INFUSION THERAPY | Facility: HOSPITAL | Age: 73
End: 2023-10-26
Attending: INTERNAL MEDICINE
Payer: MEDICARE

## 2023-10-26 VITALS
OXYGEN SATURATION: 100 % | RESPIRATION RATE: 16 BRPM | HEIGHT: 66 IN | WEIGHT: 146 LBS | HEART RATE: 65 BPM | BODY MASS INDEX: 23.46 KG/M2 | TEMPERATURE: 98 F | SYSTOLIC BLOOD PRESSURE: 107 MMHG | DIASTOLIC BLOOD PRESSURE: 60 MMHG

## 2023-10-26 DIAGNOSIS — D50.9 IRON DEFICIENCY ANEMIA, UNSPECIFIED IRON DEFICIENCY ANEMIA TYPE: Primary | ICD-10-CM

## 2023-10-26 PROCEDURE — 96365 THER/PROPH/DIAG IV INF INIT: CPT

## 2023-10-26 PROCEDURE — 63600175 PHARM REV CODE 636 W HCPCS: Performed by: INTERNAL MEDICINE

## 2023-10-26 PROCEDURE — 25000003 PHARM REV CODE 250: Performed by: INTERNAL MEDICINE

## 2023-10-26 PROCEDURE — A4216 STERILE WATER/SALINE, 10 ML: HCPCS | Performed by: INTERNAL MEDICINE

## 2023-10-26 PROCEDURE — 96367 TX/PROPH/DG ADDL SEQ IV INF: CPT

## 2023-10-26 RX ORDER — DIPHENHYDRAMINE HYDROCHLORIDE 50 MG/ML
50 INJECTION INTRAMUSCULAR; INTRAVENOUS ONCE AS NEEDED
Status: CANCELLED | OUTPATIENT
Start: 2023-11-02

## 2023-10-26 RX ORDER — SODIUM CHLORIDE 0.9 % (FLUSH) 0.9 %
10 SYRINGE (ML) INJECTION
Status: CANCELLED | OUTPATIENT
Start: 2023-11-02

## 2023-10-26 RX ORDER — EPINEPHRINE 0.3 MG/.3ML
0.3 INJECTION SUBCUTANEOUS ONCE AS NEEDED
Status: CANCELLED | OUTPATIENT
Start: 2023-11-02

## 2023-10-26 RX ORDER — HEPARIN 100 UNIT/ML
500 SYRINGE INTRAVENOUS
Status: DISCONTINUED | OUTPATIENT
Start: 2023-10-26 | End: 2023-10-26 | Stop reason: HOSPADM

## 2023-10-26 RX ORDER — HEPARIN 100 UNIT/ML
500 SYRINGE INTRAVENOUS
Status: CANCELLED | OUTPATIENT
Start: 2023-11-02

## 2023-10-26 RX ORDER — DIPHENHYDRAMINE HYDROCHLORIDE 50 MG/ML
25 INJECTION INTRAMUSCULAR; INTRAVENOUS
Status: CANCELLED
Start: 2023-11-02

## 2023-10-26 RX ORDER — SODIUM CHLORIDE 0.9 % (FLUSH) 0.9 %
10 SYRINGE (ML) INJECTION
Status: DISCONTINUED | OUTPATIENT
Start: 2023-10-26 | End: 2023-10-26 | Stop reason: HOSPADM

## 2023-10-26 RX ADMIN — IRON SUCROSE 100 MG: 20 INJECTION, SOLUTION INTRAVENOUS at 02:10

## 2023-10-26 RX ADMIN — SODIUM CHLORIDE, PRESERVATIVE FREE 10 ML: 5 INJECTION INTRAVENOUS at 04:10

## 2023-10-26 RX ADMIN — DIPHENHYDRAMINE HYDROCHLORIDE 25 MG: 50 INJECTION, SOLUTION INTRAMUSCULAR; INTRAVENOUS at 02:10

## 2023-10-26 RX ADMIN — SODIUM CHLORIDE: 0.9 INJECTION, SOLUTION INTRAVENOUS at 02:10

## 2023-10-26 RX ADMIN — HEPARIN 500 UNITS: 100 SYRINGE at 04:10

## 2023-10-26 NOTE — PLAN OF CARE
Problem: Anemia  Goal: Anemia Symptom Improvement  Outcome: Ongoing, Progressing  Intervention: Monitor and Manage Anemia  Flowsheets (Taken 10/26/2023 1400)  Oral Nutrition Promotion: rest periods promoted  Safety Promotion/Fall Prevention: medications reviewed  Fatigue Management: frequent rest breaks encouraged

## 2023-11-02 ENCOUNTER — INFUSION (OUTPATIENT)
Dept: INFUSION THERAPY | Facility: HOSPITAL | Age: 73
End: 2023-11-02
Attending: INTERNAL MEDICINE
Payer: MEDICARE

## 2023-11-02 VITALS
OXYGEN SATURATION: 98 % | SYSTOLIC BLOOD PRESSURE: 117 MMHG | TEMPERATURE: 98 F | BODY MASS INDEX: 23.49 KG/M2 | RESPIRATION RATE: 18 BRPM | WEIGHT: 146.19 LBS | HEART RATE: 60 BPM | DIASTOLIC BLOOD PRESSURE: 73 MMHG | HEIGHT: 66 IN

## 2023-11-02 DIAGNOSIS — D50.9 IRON DEFICIENCY ANEMIA, UNSPECIFIED IRON DEFICIENCY ANEMIA TYPE: Primary | ICD-10-CM

## 2023-11-02 PROCEDURE — 63600175 PHARM REV CODE 636 W HCPCS: Performed by: INTERNAL MEDICINE

## 2023-11-02 PROCEDURE — 96367 TX/PROPH/DG ADDL SEQ IV INF: CPT

## 2023-11-02 PROCEDURE — 25000003 PHARM REV CODE 250: Performed by: INTERNAL MEDICINE

## 2023-11-02 PROCEDURE — 96365 THER/PROPH/DIAG IV INF INIT: CPT

## 2023-11-02 RX ORDER — EPINEPHRINE 0.3 MG/.3ML
0.3 INJECTION SUBCUTANEOUS ONCE AS NEEDED
OUTPATIENT
Start: 2023-11-09

## 2023-11-02 RX ORDER — HEPARIN 100 UNIT/ML
500 SYRINGE INTRAVENOUS
OUTPATIENT
Start: 2023-11-09

## 2023-11-02 RX ORDER — DIPHENHYDRAMINE HYDROCHLORIDE 50 MG/ML
50 INJECTION INTRAMUSCULAR; INTRAVENOUS ONCE AS NEEDED
OUTPATIENT
Start: 2023-11-09

## 2023-11-02 RX ORDER — DIPHENHYDRAMINE HYDROCHLORIDE 50 MG/ML
25 INJECTION INTRAMUSCULAR; INTRAVENOUS
Start: 2023-11-09

## 2023-11-02 RX ORDER — HEPARIN 100 UNIT/ML
500 SYRINGE INTRAVENOUS
Status: DISCONTINUED | OUTPATIENT
Start: 2023-11-02 | End: 2023-11-02 | Stop reason: HOSPADM

## 2023-11-02 RX ORDER — SODIUM CHLORIDE 0.9 % (FLUSH) 0.9 %
10 SYRINGE (ML) INJECTION
OUTPATIENT
Start: 2023-11-09

## 2023-11-02 RX ORDER — SODIUM CHLORIDE 0.9 % (FLUSH) 0.9 %
10 SYRINGE (ML) INJECTION
Status: DISCONTINUED | OUTPATIENT
Start: 2023-11-02 | End: 2023-11-02 | Stop reason: HOSPADM

## 2023-11-02 RX ADMIN — IRON SUCROSE 100 MG: 20 INJECTION, SOLUTION INTRAVENOUS at 02:11

## 2023-11-02 RX ADMIN — DIPHENHYDRAMINE HYDROCHLORIDE 25 MG: 50 INJECTION, SOLUTION INTRAMUSCULAR; INTRAVENOUS at 02:11

## 2023-11-02 RX ADMIN — HEPARIN 500 UNITS: 100 SYRINGE at 04:11

## 2023-11-07 ENCOUNTER — OFFICE VISIT (OUTPATIENT)
Dept: SURGICAL ONCOLOGY | Facility: CLINIC | Age: 73
End: 2023-11-07
Payer: MEDICARE

## 2023-11-07 VITALS — WEIGHT: 143.75 LBS | HEIGHT: 66 IN | BODY MASS INDEX: 23.1 KG/M2

## 2023-11-07 DIAGNOSIS — C01 MALIGNANT NEOPLASM OF BASE OF TONGUE: ICD-10-CM

## 2023-11-07 DIAGNOSIS — C32.9 LARYNX CANCER: Primary | ICD-10-CM

## 2023-11-07 PROCEDURE — 99213 OFFICE O/P EST LOW 20 MIN: CPT | Mod: S$GLB,,, | Performed by: OTOLARYNGOLOGY

## 2023-11-07 PROCEDURE — 3061F PR NEG MICROALBUMINURIA RESULT DOCUMENTED/REVIEW: ICD-10-PCS | Mod: CPTII,S$GLB,, | Performed by: OTOLARYNGOLOGY

## 2023-11-07 PROCEDURE — 3066F PR DOCUMENTATION OF TREATMENT FOR NEPHROPATHY: ICD-10-PCS | Mod: CPTII,S$GLB,, | Performed by: OTOLARYNGOLOGY

## 2023-11-07 PROCEDURE — 1160F RVW MEDS BY RX/DR IN RCRD: CPT | Mod: CPTII,S$GLB,, | Performed by: OTOLARYNGOLOGY

## 2023-11-07 PROCEDURE — 3044F HG A1C LEVEL LT 7.0%: CPT | Mod: CPTII,S$GLB,, | Performed by: OTOLARYNGOLOGY

## 2023-11-07 PROCEDURE — 3008F PR BODY MASS INDEX (BMI) DOCUMENTED: ICD-10-PCS | Mod: CPTII,S$GLB,, | Performed by: OTOLARYNGOLOGY

## 2023-11-07 PROCEDURE — 99213 PR OFFICE/OUTPT VISIT, EST, LEVL III, 20-29 MIN: ICD-10-PCS | Mod: S$GLB,,, | Performed by: OTOLARYNGOLOGY

## 2023-11-07 PROCEDURE — 1101F PT FALLS ASSESS-DOCD LE1/YR: CPT | Mod: CPTII,S$GLB,, | Performed by: OTOLARYNGOLOGY

## 2023-11-07 PROCEDURE — 1126F PR PAIN SEVERITY QUANTIFIED, NO PAIN PRESENT: ICD-10-PCS | Mod: CPTII,S$GLB,, | Performed by: OTOLARYNGOLOGY

## 2023-11-07 PROCEDURE — 3066F NEPHROPATHY DOC TX: CPT | Mod: CPTII,S$GLB,, | Performed by: OTOLARYNGOLOGY

## 2023-11-07 PROCEDURE — 3288F PR FALLS RISK ASSESSMENT DOCUMENTED: ICD-10-PCS | Mod: CPTII,S$GLB,, | Performed by: OTOLARYNGOLOGY

## 2023-11-07 PROCEDURE — 99999 PR PBB SHADOW E&M-EST. PATIENT-LVL III: CPT | Mod: PBBFAC,,, | Performed by: OTOLARYNGOLOGY

## 2023-11-07 PROCEDURE — 1160F PR REVIEW ALL MEDS BY PRESCRIBER/CLIN PHARMACIST DOCUMENTED: ICD-10-PCS | Mod: CPTII,S$GLB,, | Performed by: OTOLARYNGOLOGY

## 2023-11-07 PROCEDURE — 1159F MED LIST DOCD IN RCRD: CPT | Mod: CPTII,S$GLB,, | Performed by: OTOLARYNGOLOGY

## 2023-11-07 PROCEDURE — 99999 PR PBB SHADOW E&M-EST. PATIENT-LVL III: ICD-10-PCS | Mod: PBBFAC,,, | Performed by: OTOLARYNGOLOGY

## 2023-11-07 PROCEDURE — 3008F BODY MASS INDEX DOCD: CPT | Mod: CPTII,S$GLB,, | Performed by: OTOLARYNGOLOGY

## 2023-11-07 PROCEDURE — 3061F NEG MICROALBUMINURIA REV: CPT | Mod: CPTII,S$GLB,, | Performed by: OTOLARYNGOLOGY

## 2023-11-07 PROCEDURE — 3044F PR MOST RECENT HEMOGLOBIN A1C LEVEL <7.0%: ICD-10-PCS | Mod: CPTII,S$GLB,, | Performed by: OTOLARYNGOLOGY

## 2023-11-07 PROCEDURE — 3288F FALL RISK ASSESSMENT DOCD: CPT | Mod: CPTII,S$GLB,, | Performed by: OTOLARYNGOLOGY

## 2023-11-07 PROCEDURE — 1126F AMNT PAIN NOTED NONE PRSNT: CPT | Mod: CPTII,S$GLB,, | Performed by: OTOLARYNGOLOGY

## 2023-11-07 PROCEDURE — 1101F PR PT FALLS ASSESS DOC 0-1 FALLS W/OUT INJ PAST YR: ICD-10-PCS | Mod: CPTII,S$GLB,, | Performed by: OTOLARYNGOLOGY

## 2023-11-07 PROCEDURE — 1159F PR MEDICATION LIST DOCUMENTED IN MEDICAL RECORD: ICD-10-PCS | Mod: CPTII,S$GLB,, | Performed by: OTOLARYNGOLOGY

## 2023-11-07 NOTE — PROGRESS NOTES
Chief Complaint   Patient presents with    Follow-up     1 mo f/u Laryngeal cancer.     Oncology History   Larynx cancer   8/4/2020 Initial Diagnosis    Larynx neoplasm malignant     8/6/2020 Tumor Conference    His case was discussed at the Multidisciplinary Head and Neck Team Planning Meeting.    Representatives from Medical Oncology, Radiation Oncology, Head and Neck Surgical Oncology, Psychosocial Oncology, and Speech and Language Pathology discussed the case with the following recommendations:    1) definitive radiation              8/6/2020 Cancer Staged    Staging form: Larynx - Glottis, AJCC 8th Edition  - Clinical stage from 8/6/2020: Stage II (cT2, cN0, cM0)     8/6/2020 Cancer Staged    Cancer Staging  Larynx neoplasm malignant  Staging form: Larynx - Glottis, AJCC 8th Edition  - Clinical stage from 8/6/2020: Stage II (cT2, cN0, cM0) - Signed by Fariha Muñoz NP on 8/6/2020 12/16/2021 Tumor Conference    His case was discussed at the Multidisciplinary Head and Neck Team Planning Meeting.    Representatives from Medical Oncology, Radiation Oncology, Head and Neck Surgical Oncology, Psychosocial Oncology, and Speech and Language Pathology discussed the case with the following recommendations:    1) TL  2) oncology referral  3) psychology referral            12/27/2021 Surgery    1. DL And tracheostomy  2. PEG     1/6/2022 Surgery    1. Diagnostic direct laryngoscopy  2. Bilateral modified neck dissection of levels 2 through 4  3. Total laryngectomy  4. Total thyroidectomy with bilateral paratracheal/upper mediastinal neck dissection  5. Autotransplantation of left inferior parathyroid into left sternocleidomastoid muscle   6. Left anterolateral thigh free flap for closure of total laryngectomy neck skin defect  7. Advancement flap for closure of left thigh donor site advanced area was 25 x 10 cm     1/20/2022 Cancer Staged    Staging form: Larynx - Glottis, AJCC 8th Edition  - Pathologic stage  from 1/20/2022: Stage LOU (pT4a, pN0, cM0)     5/30/2022 Cancer Staged    Staging form: Pharynx - P16 Negative Oropharynx, AJCC 8th Edition  - Clinical: Stage II (rcT2, cN0, cM0)     1/23/2023 -  Chemotherapy    Treatment Summary   Plan Name: OP HEAD NECK CISPLATIN WEEKLY + RADIOTHERAPY  Treatment Goal: Curative  Status: Active  Start Date: 1/23/2023  End Date: 3/6/2023  Provider: Venu Tamez MD  Chemotherapy: CISplatin (Platinol) 40 mg/m2 = 66 mg in sodium chloride 0.9% 631 mL chemo infusion, 40 mg/m2 = 66 mg, Intravenous, Clinic/HOD 1 time, 7 of 7 cycles  Administration: 66 mg (1/23/2023), 66 mg (2/13/2023), 66 mg (2/6/2023), 66 mg (2/20/2023), 66 mg (2/27/2023), 66 mg (3/6/2023)     Malignant neoplasm of base of tongue   5/20/2022 Cancer Staged    Staging form: Pharynx - P16 Negative Oropharynx, AJCC 8th Edition  - Clinical stage from 5/20/2022: Stage II (cT2, cN0, cM0, p16-)     6/22/2022 Initial Diagnosis    Primary cancer of base of tongue     7/11/2022 - 8/26/2022 Chemotherapy    Treatment Summary   Plan Name: OP HEAD NECK PEMBROLIZUMAB CARBOPLATIN FLUOROURACIL Q3W FOLLOWED BY MAINTENANCE PEMBROLIZUMAB 400 MG Q6W  Treatment Goal: Palliative  Status: Inactive  Start Date: 7/11/2022  End Date: 8/26/2022  Provider: Aurash Khoobehi, MD  Chemotherapy: CARBOplatin (PARAPLATIN) 440 mg in sodium chloride 0.9% 544 mL chemo infusion, 440 mg (100 % of original dose 438.5 mg), Intravenous, Clinic/HOD 1 time, 3 of 6 cycles  Dose modification:   (original dose 438.5 mg, Cycle 1)  Administration: 440 mg (7/11/2022), 435 mg (8/1/2022), 510 mg (8/22/2022)     1/23/2023 -  Chemotherapy    Treatment Summary   Plan Name: OP HEAD NECK CISPLATIN WEEKLY + RADIOTHERAPY  Treatment Goal: Curative  Status: Active  Start Date: 1/23/2023  End Date: 3/6/2023  Provider: Venu Tamez MD  Chemotherapy: CISplatin (Platinol) 40 mg/m2 = 66 mg in sodium chloride 0.9% 631 mL chemo infusion, 40 mg/m2 = 66 mg, Intravenous, Clinic/HOD  1 time, 7 of 7 cycles  Administration: 66 mg (1/23/2023), 66 mg (2/13/2023), 66 mg (2/6/2023), 66 mg (2/20/2023), 66 mg (2/27/2023), 66 mg (3/6/2023)           HPI   73 y.o. male presents with the above treatment history.  He is status post total glossectomy and ALT free flap reconstruction.  No new complaints.  No relief from sialorrhea with Robinul.    Review of Systems   Constitutional: Negative for fatigue and unexpected weight change.   HENT: Per HPI.  Eyes: Negative for visual disturbance.   Respiratory: Negative for shortness of breath, hemoptysis   Cardiovascular: Negative for chest pain and palpitations.   Musculoskeletal: Negative for decreased ROM, back pain.   Skin: Negative for rash, sunburn, itching.   Neurological: Negative for dizziness and seizures.   Hematological: Negative for adenopathy. Does not bruise/bleed easily.   Endocrine: Negative for rapid weight loss/weight gain, heat/cold intolerance.     Past Medical History   Patient Active Problem List   Diagnosis    Melanocytic nevus    Body mass index (BMI) of 29.0-29.9 in adult    Benign hypertension    Overweight    Paroxysmal atrial fibrillation    Type 2 diabetes mellitus with diabetic arthropathy, with long-term current use of insulin    Fatty liver disease, nonalcoholic    False positive serological test for hepatitis C    Hyperlipidemia    Type 2 diabetes mellitus with hyperglycemia, without long-term current use of insulin    Gastroesophageal reflux disease without esophagitis    Type 2 diabetes mellitus with hyperglycemia    Vitamin B12 deficiency    Hyperuricemia    Hypovitaminosis D    Proteinuria    On enteral nutrition    Larynx cancer    Dehydration    NSVT (nonsustained ventricular tachycardia)    Adverse effect of adrenal cortical steroids, sequela    S/P laryngectomy    Hypocalcemia    Aphonia    Dysphagia, pharyngoesophageal    Acute pain of both shoulders    Bilateral arm weakness    Oral bleeding    Malignant neoplasm of base  of tongue    Advanced care planning/counseling discussion    Iron deficiency anemia due to chronic blood loss    Postoperative hypothyroidism    Pressure injury of sacral region, stage 1    Pneumatosis intestinalis    Nausea and vomiting    Neutropenia    Abnormal CT scan, neck    Open wound of neck    Open wnd of pharynx    Open wound of left thigh    Open wound of mouth    Tracheostomy in place    Malnutrition of mild degree    Type 2 diabetes mellitus, without long-term current use of insulin    Laryngeal cancer    Hypocalcemia    Hyperbilirubinemia    Elevated transaminase level    Hyponatremia    PEG (percutaneous endoscopic gastrostomy) adjustment/replacement/removal    Hypothyroidism    Dehydration    Pharyngocutaneous fistula    Protein malnutrition    Elevated alkaline phosphatase level    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    Scar conditions and fibrosis of skin    Decreased ROM of neck    Iron deficiency anemia    Chemotherapy-induced peripheral neuropathy    Phantom pain    Chronic low back pain    Palliative care by specialist    Cancer related pain           Past Surgical History   Past Surgical History:   Procedure Laterality Date    CATARACT EXTRACTION W/  INTRAOCULAR LENS IMPLANT Right 8/16/2023    Procedure: CEIOL OD  NPO-peg;  Surgeon: Stoney Munoz MD;  Location: [facility] OR;  Service: Ophthalmology;  Laterality: Right;    CATARACT EXTRACTION W/  INTRAOCULAR LENS IMPLANT Left 10/18/2023    Procedure: CEIOL OS npo peg;  Surgeon: Stoney Munoz MD;  Location: [facility] OR;  Service: Ophthalmology;  Laterality: Left;    DIRECT LARYNGOBRONCHOSCOPY N/A 12/27/2021    Procedure: LARYNGOSCOPY, DIRECT, WITH BRONCHOSCOPY;  Surgeon: Flex Espinosa MD;  Location: [facility] OR [address] [place] [address];  Service: ENT;  Laterality: N/A;    DIRECT LARYNGOSCOPY  11/9/2022    Procedure: LARYNGOSCOPY, DIRECT;  Surgeon: Jesse James MD;  Location: [facility] OR [address] [place] [address];  Service: ENT;;    DISSECTION OF NECK Bilateral 1/6/2022     Procedure: DISSECTION, NECK;  Surgeon: Jesse James MD;  Location: [facility] OR [facility];  Service: ENT;  Laterality: Bilateral;    DISSECTION OF NECK Bilateral 11/9/2022    Procedure: DISSECTION, NECK;  Surgeon: Jesse James MD;  Location: [facility] OR [facility];  Service: ENT;  Laterality: Bilateral;    FLAP PROCEDURE Right 1/6/2022    Procedure: CREATION, FREE FLAP;  Surgeon: Alise Hart MD;  Location: [facility] OR [facility];  Service: ENT;  Laterality: Right;  Ischemic start 1351  Ischemic stop 1502    FLAP PROCEDURE Left 11/9/2022    Procedure: CREATION, FREE FLAP, ALT;  Surgeon: Alise Hart MD;  [facility];  Service: ENT;  Laterality: Left;    GLOSSECTOMY Bilateral 11/9/2022    Procedure: TOTAL GLOSSECTOMY;  Surgeon: Jesse James MD;  [facility];  Service: ENT;  Laterality: Bilateral;    INSERTION OF TUNNELED CENTRAL VENOUS CATHETER (CVC) WITH SUBCUTANEOUS PORT N/A 6/9/2022    Procedure: [id number];  Surgeon: Jesus Viera MD;  Location: [facility];  Service: General;  Laterality: N/A;    INSERTION OF TUNNELED CENTRAL VENOUS CATHETER (CVC) WITH SUBCUTANEOUS PORT Right 1/12/2023    Procedure: [id number];  Surgeon: Abdulaziz Le Jr., MD;  Location: [facility];  Service: General;  Laterality: Right;    LARYNGECTOMY N/A 1/6/2022    Procedure: LARYNGECTOMY;  Surgeon: Jesse James MD;  [facility];  Service: ENT;  Laterality: N/A;    LARYNGOSCOPY N/A 8/4/2020    Procedure: Suspension microlaryngoscopy with biopsy, possible KTP laser treatment/excision;  Surgeon: Stew Noel MD;  [facility];  Service: ENT;  Laterality: N/A;  Microscope, telescopes, tower, microinstruments, KTP laser, rep conf# [id number] IC 7/28.    LARYNGOSCOPY N/A 3/16/2021    Procedure: Suspension microlaryngoscopy with excision of lesion, possible CO2 laser;  Surgeon: Stew Noel MD;  Location: [facility] OR [address] [place] [address];  Service: ENT;   Laterality: N/A;  Microscope, telescopes, tower, microinstruments, CO2 laser, rep conf# [id number] IC 3/4.    LARYNGOSCOPY N/A 4/1/2021    Procedure: Suspension microlaryngoscopy with KTP laser excision of lesion;  Surgeon: Stew Noel MD;  [facility];  Service: ENT;  Laterality: N/A;  Microscope, telescopes, tower, microinstruments, 70 degree scope, vocal fold , KTP laser, rep conf# [id number] BC    LARYNGOSCOPY N/A 12/9/2021    Procedure: Suspension microlaryngoscopy with biopsy;  Surgeon: Stew Noel MD;  [facility];  Service: ENT;  Laterality: N/A;  Microscope, telescopes, tower, microinstruments    LARYNGOSCOPY N/A 1/6/2022    Procedure: LARYNGOSCOPY;  Surgeon: Jesse James MD;  [facility];  Service: ENT;  Laterality: N/A;    LARYNGOSCOPY N/A 4/27/2022    Procedure: LARYNGOSCOPY WITH BIOPSY;  Surgeon: Jesse James MD;  [facility];  Service: ENT;  Laterality: N/A;    MICROLARYNGOSCOPY N/A 3/17/2020    Procedure: MICROLARYNGOSCOPY;  Surgeon: Jung Xiao MD;  Location: [facility];  Service: ENT;  Laterality: N/A;  Laser Microlaryngoscopy  NEED TO SCHEDULE LASER from [facility] [id number]    PHARYNGECTOMY  11/9/2022    Procedure: TOTAL PHARYNGECTOMY;  Surgeon: Jesse James MD;  [facility];  Service: ENT;;    REIMPLANTATION OF PARATHYROID TISSUE N/A 1/6/2022    Procedure: REIMPLANTATION, PARATHYROID TISSUE;  Surgeon: Jesse James MD;  [facility];  Service: ENT;  Laterality: N/A;    SKIN SPLIT GRAFT Right 11/9/2022    Procedure: APPLICATION, GRAFT, SKIN, SPLIT-THICKNESS;  Surgeon: Jesse James MD;  [facility];  Service: ENT;  Laterality: Right;    THYROIDECTOMY  1/6/2022    Procedure: THYROIDECTOMY;  Surgeon: Jesse James MD;  Location: [facility] OR [address] [place] [address];  Service: ENT;;    TRACHEOSTOMY N/A 12/27/2021    Procedure: CREATION, TRACHEOSTOMY;   Surgeon: Flex Espinosa MD;  Location: [facility] OR [address] [place] [address];  Service: ENT;  Laterality: N/A;         Family History   Family History   Problem Relation Age of Onset    Abnormal EKG Mother     Diabetes Father     Heart disease Father     Hypertension Father            Social History   .  Social History     Socioeconomic History    Marital status:      Spouse name: Janel Batres    Number of children: 2   Occupational History    Occupation: [employer]     Employer: [employer]   Tobacco Use    Smoking status: Never    Smokeless tobacco: Never   Substance and Sexual Activity    Alcohol use: Not Currently     Comment: occasional    Drug use: No    Sexual activity: Yes     Partners: Female   Social History Narrative    ** Merged History Encounter **         2 children from his 1st wife     Social Determinants of Health     Financial Resource Strain: Low Risk  (8/16/2023)    Overall Financial Resource Strain (CARDIA)     Difficulty of Paying Living Expenses: Not hard at all   Food Insecurity: No Food Insecurity (8/16/2023)    Hunger Vital Sign     Worried About Running Out of Food in the Last Year: Never true     Ran Out of Food in the Last Year: Never true   Transportation Needs: No Transportation Needs (8/16/2023)    PRAPARE - Transportation     Lack of Transportation (Medical): No     Lack of Transportation (Non-Medical): No   Physical Activity: Sufficiently Active (8/16/2023)    Exercise Vital Sign     Days of Exercise per Week: 7 days     Minutes of Exercise per Session: 60 min   Stress: No Stress Concern Present (8/16/2023)    Citizen of [place] [employer] Occupational [employer] - Occupational Stress Questionnaire     Feeling of Stress : Not at all   Social Connections: Socially Isolated (8/16/2023)    Social Connection and Isolation Panel [NHANES]     Frequency of Communication with Friends and Family: Never     Frequency of Social Gatherings with Friends and Family: Never     Attends Congregational Services: Never     Active  Member of Clubs or Organizations: No     Attends Club or Organization Meetings: Never     Marital Status:    Housing Stability: Low Risk  (8/16/2023)    Housing Stability Vital Sign     Unable to Pay for Housing in the Last Year: No     Number of Places Lived in the Last Year: 1     Unstable Housing in the Last Year: No         Allergies   Review of patient's allergies indicates:   Allergen Reactions    Lovastatin Itching    Pollen extracts     Lovastatin Rash     Not confirmed but pt skeptical           Physical Exam     There were no vitals filed for this visit.          Body mass index is 23.2 kg/m².      General: AOx3, NAD   Respiratory:  Symmetric chest rise, normal effort  Oral Cavity:  Flap healthy. No worrisome lesions. Moderate trismus.  Oropharynx:  No masses/lesions of the posterior pharyngeal wall. Tonsillar fossa without lesions. Soft palate without masses. Midline uvula.   Neck: Stoma widely patent. Skin paddle healthy with good color and turgor.Well-healed neck incisions.No LAD. Moderate post-op, post-treatment fibrosis. Submental neck diffusely full.  Face: House Brackmann I bilaterally.    Neopharynx clear on mirror exam.      Assessment/Plan  Problem List Items Addressed This Visit          Oncology    Larynx cancer - Primary     ZAY.  RTC 3 mos.  He is to see me in [facility] for Botox injection into parotid glands for sialorrhea.         Malignant neoplasm of base of tongue

## 2023-11-07 NOTE — ASSESSMENT & PLAN NOTE
ZAY.  RTC 3 mos.  He is to see me in [place] for Botox injection into parotid glands for sialorrhea.

## 2023-11-07 NOTE — Clinical Note
Please have him see me in [place] for parotid gland Botox. Portal is the best way to contact him. Thanks.

## 2023-11-08 ENCOUNTER — PATIENT MESSAGE (OUTPATIENT)
Dept: OTOLARYNGOLOGY | Facility: CLINIC | Age: 73
End: 2023-11-08
Payer: MEDICARE

## 2023-11-14 NOTE — PROGRESS NOTES
[facility] Hematology/Oncology  PROGRESS NOTE -  Follow-up Visit      Subjective:       Patient ID:   NAME: Cyrus Batres Jr. : 1950     73 y.o. 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  The patient is here today to go over the results of the recently ordered labs, tests and studies. He is here by himself.      He previously had completed chemotherapy and  XRT.  He seems to have tolerated the regimen fairly well . He saw Dr James with ENT on  and had repeat scope with good report per patient.      He last saw Dr Patterson on 8/3/2023 and sees him again in 2024, Dr Portillo on 2023    He had last PET on   and CT Chest/Neck in 2023    He is doing well and is active at home.      He is breathing ok. 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 He had his portacath removed on  and replaced on 2023 by Dr Le            Discussed covid19 and he has been vaccinated      ROS:   GEN: normal without any fever, night sweats or weight loss; doing well with tube feeds   HEENT: normal with no HA's, sore throat, stiff neck, changes in vision  CV: normal with no CP, SOB, PND, no currentDOE  PULM: normal with no SOB, cough, hemoptysis, sputum or pleuritic pain  GI: no current N/V  ; has peg tube;    : normal with no  hematuria, dysuria  BREAST: normal with no mass, discharge, pain  SKIN: normal with no rash, erythema, bruising, or swelling     Pain Scale:  0    Allergies:  Review of patient's allergies indicates:   Allergen Reactions    Lovastatin Itching    Pollen extracts     Lovastatin Rash     Not confirmed but pt skeptical       Medications:    Current Outpatient Medications:     acetaminophen (TYLENOL) 325 MG tablet, Take 325 mg by mouth every 6 (six) hours as needed for Pain., Disp: , Rfl:     calcitrioL (ROCALTROL) 1 mcg/mL solution, Give 0.25 mLs (0.25 mcg total) by Per G Tube route once daily., Disp: 15 mL, Rfl: 3    calcium carbonate 500 mg/5 mL (1,250 mg/5 mL), Take 20 mls four times daily with meals and nightly., Disp: 2300 mL, Rfl: 12    esomeprazole (NEXIUM) 40 MG capsule, 40 mg before breakfast., Disp: , Rfl:     famotidine (PEPCID) 40 mg/5 mL (8 mg/mL) suspension, Give 2.5 mLs (20 mg total) by Per G Tube route 2 (two) times daily., Disp: 50 mL, Rfl: 11    gabapentin (NEURONTIN) 300 MG capsule, Take 2 capsules (600 mg total) by mouth 3 (three) times daily., Disp: 180 capsule, Rfl: 0    ibuprofen (ADVIL,MOTRIN) 200 MG tablet, Take 200 mg by mouth every 6 (six) hours as needed for Pain., Disp: , Rfl:     ketorolac 0.5% (ACULAR) 0.5 % Drop, Place 1 drop into the left eye 4 (four) times daily. Start 3 days before surgery., Disp: 5 mL, Rfl: 2    lactose-reduced food with fibr (JEVITY 1.5 TRISHA) 0.06 gram-1.5 kcal/mL Liqd, 6 cartons per day via peg.  Flush 60ml before and after each bolus, Disp: 180 each, Rfl: 11    levothyroxine (SYNTHROID) 100 MCG tablet, 1 tablet (100 mcg total) by Per G Tube route before breakfast., Disp: 30 tablet, Rfl: 11    moxifloxacin (VIGAMOX) 0.5 % ophthalmic solution, Place 1 drop into the left eye 4 (four) times daily. Start 1 day before cataract surgery, Disp: 3 mL, Rfl: 1    prednisoLONE acetate (PRED FORTE) 1 % DrpS, Place 1 drop into the left eye 4 (four) times daily. Start after  "cataract surgery., Disp: 5 mL, Rfl: 2    traZODone (DESYREL) 50 MG tablet, TAKE 1 TABLET BY MOUTH NIGHTLY AS NEEDED FOR INSOMNIA., Disp: 90 tablet, Rfl: 1    losartan (COZAAR) 100 MG tablet, Take 0.5 tablets (50 mg total) by mouth once daily. (Patient not taking: Reported on 11/7/2023), Disp: 45 tablet, Rfl: 3    sodium chloride 1,000 mg TbSO oral tablet, Take 1 tablet (1,000 mg total) by mouth 2 (two) times a day. Please crush the tablets for the PEG tube feeding or see if a capsule form is available. (Patient not taking: Reported on 11/7/2023), Disp: 100 tablet, Rfl: 2    zolpidem (AMBIEN) 5 MG Tab, 1 tablet (5 mg total) by Per G Tube route nightly as needed (difficult with sleep)., Disp: 30 tablet, Rfl: 0    PMHx/PSHx Updates:  See patient's last visit with me on 8/23/2023  See H&P on 9/23/2022        Pathology:   Cancer Staging   Larynx cancer  Staging form: Larynx - Glottis, AJCC 8th Edition  - Clinical stage from 8/6/2020: Stage II (cT2, cN0, cM0) - Signed by Fariha Muñoz NP on 8/6/2020  - Pathologic stage from 1/20/2022: Stage LOU (pT4a, pN0, cM0) - Signed by Fariha Muñoz NP on 1/20/2022  Staging form: Pharynx - P16 Negative Oropharynx, AJCC 8th Edition  - Clinical: Stage II (rcT2, cN0, cM0) - Signed by Matheus Portillo Jr., MD on 5/30/2022    Malignant neoplasm of base of tongue  Staging form: Pharynx - P16 Negative Oropharynx, AJCC 8th Edition  - Clinical stage from 5/20/2022: Stage II (cT2, cN0, cM0, p16-) - Signed by Saúl Kessler MD on 7/7/2022    Dissection 11/9/2022:    Final Pathologic Diagnosis 1. "left submandibular lymph node", dissection:       - Three lymph nodes, negative for carcinoma (0/3)   2. 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       - Tumor size: 4.5 cm       - Resection margins: left superior pharyngeal margin focally involved;   all other margins are negative for " carcinoma       - Left internal jugular vein: free of tumor       - One lymph node, negative for carcinoma (0/1)   Note: P16 immunostain is negative     Tumor Site       Oropharynx  involving Base of tongue       Tumor Laterality       Midline       Tumor Size       Greatest Dimension (Centimeters) 4.5 cm         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)       Histologic Grade       G2 to G3: Moderate to Poorly differentiated       Lymphovascular Invasion       Not identified       Perineural Invasion       Not identified     MARGINS      Margins       Involved by invasive tumor     Margin(s)   Involved by Invasive Tumor:      left superior pharyngeal margin; all other   margins are free of tumor     LYMPH NODES    Regional Lymph Node Status:    :     Regional Lymph Node   Status:      All regional lymph nodes negative for tumor      pT Category:               pT3   pN Category:               pN0      Base of Tongue Biopsy  4/27/2022:  Final Pathologic Diagnosis BIOPSY OF BASE OF THE TONGUE:   THE INFILTRATING POORLY DIFFERENTIATED SQUAMOUS CELL CARCINOMA   THE TUMOR IS P16 NEGATIVE.  THE POSITIVE AND NEGATIVE CONTROLS STAINED   APPROPRIATELY      Larynx resection/thyroidectomy 1/6/2022:     Final Pathologic Diagnosis 1. Lymph nodes, left neck levels 2,  3 and 4, dissection:   - Nineteen lymph nodes, all  negative  for metastatic carcinoma (0/19)   2. Lymph nodes, right neck levels 2,  3 and 4, dissection:   - Twenty-eight lymph nodes, all  negative  for metastatic carcinoma (0/28)   3. Possible parathyroid gland, excision:   - Benign parathyroid gland tissue (0.003 g)   4. Larynx, thyroid and bilateral level 6 lymph nodes, total laryngectomy,   total thyroidectomy and bilateral level 6 lymph node dissection:   - Invasive, well to moderately differentiated, keratinizing squamous cell   carcinoma (4.2 cm)   - Left lobe of thyroid gland,  positive  for invasive squamous cell carcinoma   - Margins   "negative  for invasive carcinoma or dysplasia   - Three lymph nodes,  negative  for metastatic carcinoma (0/3)   - See synoptic report below for details and complete pathologic staging   5. Soft tissue, posterior tracheal margin, excision:   - Benign, focally reactive respiratory mucosa with submucosal acute   inflammation,  negative  for dysplasia or malignancy   6. Soft tissue, anterior tracheal margin, excision:   - Benign respiratory mucosa with subepithelial cartilage,  negative  for   dysplasia or malignancy   7. Soft tissue, posterior tracheal margin #2, excision:   - Benign, focally reactive respiratory mucosa with submucosal acute   inflammation,  negative  for dysplasia or malignancy   8. Soft tissue, anterior tracheal margin #2, excision:   - Benign, reactive focally ulcerated respiratory mucosa with submucosal acute   inflammation,  negative  for dysplasia or malignancy             Laryngoscope 8/4/2020: (with Dr Noel):  Final Pathologic Diagnosis 1.  Left true vocal fold, biopsy:       -  Invasive moderately differentiated squamous cell carcinoma,   keratinizing type   2.  Left true vocal fold, biopsy:       -  Invasive moderately differentiated squamous cell carcinoma,   keratinizing type             Laryngoscope 3/17/2020 (with Dr Xiao):  Final Pathologic Diagnosis 1.  BIOPSY OF LEFT TRUE VOCAL CORD:   SEVERELY DYSPLASTIC APPEARING SQUAMOUS MUCOSA   INVASIVE CARCINOMA IS NOT DOCUMENTED   ON THE OTHER HAND, THESE FRAGMENTS ARE NODUL AND WITHOUT SUBMUCOSA FOR   EVALUATION; IT IS POSSIBLE THAT THEY REFLECT INVASIVE SQUAMOUS CARCINOMA   2.  BIOPSY OF LEFT ANTERIOR COMMISSURE:   MODERATE DYSPLASIA   NO INVASIVE CARCINOMA IDENTIFIED             Objective:     Vitals:  Blood pressure 114/74, pulse 64, temperature 97.6 °F (36.4 °C), resp. rate 16, height 5' 6" (1.676 m), weight 67.9 kg (149 lb 12.8 oz).    Physical Examination:   GEN: no apparent distress, comfortable; AAOx3  HEAD: atraumatic and " normocephalic  EYES: no pallor, no icterus, PERRLA  ENT: OMM, no pharyngeal erythema, external ears WNL; no nasal discharge; no thrush; s/p laryngectomy with flap since healed well; trach   NECK: no masses, thyroid normal, trachea midline, no LAD/LN's, supple; post-op dissection healed well;    CV: RRR with no murmur; normal pulse; normal S1 and S2; no pedal edema; portacath   CHEST: Normal respiratory effort; CTAB; normal breath sounds; no wheeze or crackles  ABDOM: nontender and nondistended; soft; normal bowel sounds; no rebound/guarding; peg tube  MUSC/Skeletal: ROM normal; no crepitus; joints normal; no deformities or arthropathy  EXTREM: no clubbing, cyanosis, inflammation or swelling  SKIN: no rashes, lesions, ulcers, petechiae or subcutaneous nodules  : no mahan  NEURO: grossly intact; motor/sensory WNL; AAOx3; no tremors  PSYCH: normal mood, affect and behavior  LYMPH: normal cervical, supraclavicular, axillary and groin LN's          Labs:     Lab Results   Component Value Date    WBC 5.31 10/13/2023    HGB 12.0 (L) 10/13/2023    HCT 35.8 (L) 10/13/2023    MCV 94 10/13/2023     (L) 10/13/2023     CMP  Sodium   Date Value Ref Range Status   10/13/2023 133 (L) 136 - 145 mmol/L Final     Potassium   Date Value Ref Range Status   10/13/2023 4.0 3.5 - 5.1 mmol/L Final     Chloride   Date Value Ref Range Status   10/13/2023 97 95 - 110 mmol/L Final     CO2   Date Value Ref Range Status   10/13/2023 29 23 - 29 mmol/L Final     Glucose   Date Value Ref Range Status   10/13/2023 126 (H) 70 - 110 mg/dL Final     BUN   Date Value Ref Range Status   10/13/2023 23 8 - 23 mg/dL Final     Creatinine   Date Value Ref Range Status   10/13/2023 1.0 0.5 - 1.4 mg/dL Final     Calcium   Date Value Ref Range Status   10/13/2023 8.9 8.7 - 10.5 mg/dL Final     Total Protein   Date Value Ref Range Status   10/13/2023 7.1 6.0 - 8.4 g/dL Final     Albumin   Date Value Ref Range Status   10/13/2023 4.4 3.5 - 5.2 g/dL Final    11/18/2020 3.7 3.6 - 5.1 g/dL Final     Comment:     For additional information, please refer to   http://education.[employer] Intelligence/faq/MAZ029 (This link is   being provided for informational/ educational purposes only.)  This test was developed and its analytical performance   characteristics have been determined by [facility]. It has not been cleared or approved by the   [place] Food and Drug Administration. This assay has been validated   pursuant to the CLIA regulations and is used for clinical   purposes.  @ Test Performed By:  [facility]  Yo Cortes M.D.,   [address] [place] [id number]  [place]  [id number]       Total Bilirubin   Date Value Ref Range Status   10/13/2023 0.9 0.1 - 1.0 mg/dL Final     Comment:     For infants and newborns, interpretation of results should be based  on gestational age, weight and in agreement with clinical  observations.    Premature Infant recommended reference ranges:  Up to 24 hours.............<8.0 mg/dL  Up to 48 hours............<12.0 mg/dL  3-5 days..................<15.0 mg/dL  6-29 days.................<15.0 mg/dL       Alkaline Phosphatase   Date Value Ref Range Status   10/13/2023 144 (H) 55 - 135 U/L Final     AST   Date Value Ref Range Status   10/13/2023 27 10 - 40 U/L Final     ALT   Date Value Ref Range Status   10/13/2023 28 10 - 44 U/L Final     Anion Gap   Date Value Ref Range Status   10/13/2023 7 (L) 8 - 16 mmol/L Final     eGFR if    Date Value Ref Range Status   07/29/2022 >60.0 >60 mL/min/1.73 m^2 Final     eGFR if non    Date Value Ref Range Status   07/29/2022 >60.0 >60 mL/min/1.73 m^2 Final     Comment:     Calculation used to obtain the estimated glomerular filtration  rate (eGFR) is the CKD-EPI equation.                   Radiology/Diagnostic Studies:      Ct Chest/Neck 9/21/2023:    IMPRESSION:     1. Extensive  postsurgical changes throughout the neck as described, with no evidence of residual or recurrent malignancy.  2. Negative for metastatic disease throughout the neck or the chest.  3. 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  4. Coronary artery calcifications.        PET 5/30/2023:    IMPRESSION: Postsurgical changes from total glossectomy, laryngectomy and pharyngectomy with bilateral neck dissections     There is resolution of the previously noted fluid collections within the neck. There is mild FDG activity in the left side the neck adjacent to the neoesophagus as well as at the tracheostomy site to the right of the neoesophagus. Findings most likely are secondary to postsurgical and postinfection changes. There is no focal mass or adenopathy     No evidence of distant metastasis             CTA Neck    Result Date: 9/20/2022  EXAMINATION: CTA NECK CLINICAL HISTORY: Head/neck cancer, monitor;Malignant neoplasm of base of tongue TECHNIQUE: CT angiogram was performed from the level of the scott to the EAC following the IV administration of 75mL of Omnipaque 350.   Sagittal and coronal reconstructions and maximum intensity projection reconstructions were performed. Arterial stenosis percentages are based on NASCET measurement criteria. COMPARISON: CTA neck dated 05/20/2022 FINDINGS: Aortic arch and great vessels: There is a conventional left-sided 3 vessel arch.  The aortic arch is widely patent.  There is stable soft and calcified plaque in the proximal left subclavian artery with a similar appearance the prior study and possibly within radiation field but without critical stenosis or occlusion.  The right subclavian artery is patent.  The brachiocephalic trunk and origin of the left common carotid artery are patent. Carotid arteries Right carotid artery: There is calcified plaque scattered in the right common carotid artery without critical stenosis, occlusion or change.  There is no measurable  stenosis of the internal carotid artery which is patent to the skull base. Left carotid artery: There is mild plaque scattered in the left common carotid artery without change and without critical stenosis or occlusion.  There is no measurable stenosis of the internal carotid artery. Vertebral arteries: The left vertebral artery is dominant and patent throughout its course in the neck.  The right vertebral artery is hypoplastic but patent.  There is no critical stenosis, occlusion, thrombus or dissection.  There is no change. The study extends intracranially.  There is calcified plaque in the V4 segment of the left vertebral artery resulting in a moderate to marked short segment nonocclusive stenosis.  The right vertebral artery partially terminates in a posteroinferior cerebellar artery with a short segment moderate to marked stenosis of the very distal right vertebral artery.  Some flow within the distal right vertebral artery may represent retrograde flow from the dominant left vertebral artery.  The basilar artery is patent.  The origins of the superior cerebellar and posterior cerebral artery on the right are patent.  There is fetal origin of the left posterior cerebral artery which is patent. There is plaque in the cavernous segments of both internal carotid arteries but there is no critical stenosis or large vessel occlusion of the anterior circulation vessels.  There is no large aneurysm. Other: There is a similar appearance of the included upper lungs with pleural and parenchymal changes anteriorly in the upper lobes bilaterally.  As previously discussed, timing of the contrast bolus is performed during the arterial phase for CTA neck.  Therefore, enhancement of the soft tissues is somewhat suboptimal due to this technique. There has been a large region of soft tissue resection in the anterior mid and lower neck soft tissues with continued open defect in the upper chest and lower neck above the manubrium.   Soft tissue seen along the left posterolateral border of the trachea could represent secretions or mucus.  This was present previously. Redemonstrated is a large heterogeneously enhancing lesion centered at the level of the tongue base and floor of the mouth centrally into the left.  There is scattered calcifications.  The prior CTA demonstrated regions of central necrosis which are no longer definitely present but diffusely heterogeneous density and enhancement likely represents regions of necrosis.  This large heterogeneously enhancing soft tissue mass extends more anteriorly in the floor of the mouth on the left and measures at least 5.6 cm in greatest anterior to posterior dimension with 3.6 cm transverse dimension.  This does extend anteriorly to the medial margin of the body of the mandible on the left. There are post treatment changes with stranding in the neck fat.     1. Similar appearance of the neck vessels when compared to the prior CTA neck with mild narrowing at the origin of the left subclavian artery.  There is no critical stenosis, occlusion, thrombosis or dissection involving the cervical vertebral or carotid arteries. 2. Larger mass within the hypopharynx and tongue base extending anteriorly to the floor of the mouth on the left to the medial margin of the body of the mandible without obvious bony destruction. 3. There is nonocclusive stenosis of the distal V4 segment of the left vertebral artery without change. 4. There is no large vessel occlusion or critical stenosis of the anterior circulation. 5. There is developmental variation with fetal origin of the left posterior cerebral artery. Electronically signed by: Rex Joyner MD Date:    09/20/2022 Time:    11:31    CT Abdomen Pelvis With Contrast    Result Date: 9/1/2022  CMS MANDATED QUALITY DATA - CT RADIATION - 436 All CT scans at this facility utilize dose modulation, iterative reconstruction, and/or weight based dosing when  appropriate to reduce radiation dose to as low as reasonably achievable. Reason: abdominal pain partial history of laryngeal carcinoma TECHNIQUE: CT abdomen and pelvis with 100 mL Omnipaque 350. COMPARISON: PET/CT 5/13/2022 CT ABDOMEN: Coronary artery calcification and extremely tiny hiatal hernia incidentally noted. Visualized lung bases are clear. Liver, gallbladder, pancreas, spleen, adrenals, and kidneys are normal. Mild aortoiliac calcifications present. Gastrostomy tube tip lies in distal gastric body. Small intestines are unremarkable. Mild wall thickening affects the ascending colon with pneumatosis intestinalis diffusely affecting the ascending colon. No other free intraperitoneal gas or, and remainder of colon is normal. A normal appendix is present. The major mesenteric vascular structures are patent. No acute osseous abnormality. CT PELVIS: Prostate is slightly enlarged. Bladder is normal. No free pelvic fluid. Tiny right and small to moderate left fat-containing inguinal hernias are present. No acute osseous abnormality. IMPRESSION: 1. Pneumatosis intestinalis affecting the ascending colon with associated mild wall thickening. Etiology of this is undetermined. Potential considerations include intestinal ischemia or pneumatosis related to chemotherapy. Clinical and laboratory correlation is needed to assess significance. No portal venous gas or free intraperitoneal air however. 2. Coronary artery calcifications Electronically signed by:  Nael Hui MD  9/1/2022 6:20 PM CDT Workstation: [id number]    NM PET CT Routine Skull to Mid Thigh    Result Date: 9/27/2022  PET CT WITH IMAGE FUSION HISTORY:  restage tongue cancer RESTAGING...  HX: TONGUE CA.  DX: April 2022.  LAST CHEMO TREATMENT WAS 3WKS AGO.  EVALUATE TX RESPONSE.   ALSO HX OF LARYNGEAL CA IN AUGUST 2020.  MULTIPLE SURGERIES: LARYNGECTOMY, THYROIDECTOMY & TRACHEOSTOMY.  RAD TX WAS COMPLETED IN NOV 2020. TECHNIQUE: Following IV administration of  11.2 mCi of F-18 labeled FDG into right antecubital fossa and a 60 minute delay, PET CT was performed from the vertex of the skull through the proximal thighs with an integrated PET CT scanner with image fusion. CT images were obtained to aid in attenuation correction and PET localization. The patient's serum glucose at the time of the exam was 136 mg/dL. COMPARISON: PET/CT 5/13/2022 FINDINGS: Poorly characterized soft tissue mass involving base of tongue approximately measures 4.3 x 2.8 cm (series 3 image 65) appearing similar to the prior exam. This reaches current max SUV of 11.8, not significantly changed from prior of 11.4. No other abnormal FDG activity. Intracranial compartment is unremarkable. Trace bilateral maxillary sinus mucosal thickening is present. Postoperative changes of laryngectomy and tracheostomy are present. Surgical clips occur throughout the neck. No definite enlarged cervical or supraclavicular lymph node, with evaluation limited by lack of IV contrast. Left subclavian port catheter tip terminates in SVC. Diffuse coronary artery calcifications are present. No enlarged mediastinal or axillary lymph nodes. No pulmonary nodule or mass. Gastrostomy tube tip lies in gastric body. Moderate aortoiliac calcifications are present. Small fat-containing left inguinal hernia incidentally noted. Mild degenerative changes affect the spine. No suspicious osseous abnormality. IMPRESSION: 1. No significant change in the FDG avid mass involving the tongue base, remaining characteristic of malignancy. 2. No new FDG avid malignancy or metastatic disease. Electronically signed by:  Nael Hui MD  9/27/2022 2:38 PM CDT Workstation: [id number]    CT Abdomen Pelvis  Without Contrast    Result Date: 9/4/2022  CMS MANDATED QUALITY DATA - CT RADIATION  436 All CT scans at this facility utilize dose modulation, iterative reconstruction, and/or weight based dosing when appropriate to reduce radiation dose to as low  as reasonably achievable. CT ABDOMEN PELVIS WITHOUT IV CONTRAST CLINICAL HISTORY: 72 years Male Follow-up pneumatosis COMPARISON: CT abdomen and pelvis September 1, 2022 FINDINGS: Imaging through the lower thorax demonstrates subsegmental atelectasis of the dependent lower lobes. Coronary artery calcification. Bone window images show no acute or aggressive osseous abnormality. Transitional lumbosacral vertebral body. On this unenhanced exam, no focal hepatic lesion. Gallbladder and biliary tree are unremarkable. Spleen appears normal. Pancreas is unremarkable. No adrenal lesion. No renal calculi or hydronephrosis. Ureters are normal in caliber. Urinary bladder is within normal limits. Gastrostomy tube is in place within the stomach. Stomach is largely collapsed. No evidence of small bowel obstruction. Pneumatosis and pericolonic abscess involving the ascending colon is redemonstrated, slightly diminished compared to prior. Mild wall thickening throughout the colon. No free fluid or free air within the abdomen or pelvis. Aortoiliac atherosclerotic calcification. No pathologically enlarged lymph nodes within the abdomen or pelvis. Bilateral fat-containing inguinal hernias, larger on the left. IMPRESSION: Pneumatosis and pericolonic gas about the ascending colon has decreased in volume compared to prior. Persistent colonic wall thickening which could indicate colitis. Coronary and aortic atherosclerosis. Gastrostomy tube is within the stomach. Bilateral inguinal hernias. Electronically signed by:  Jose De Jesus Oleary MD  9/4/2022 4:06 PM CDT Workstation: [id number]    CTA neck  5/20/2022:     IMPRESSION:  Persistent mass within the hypopharynx consistent with malignancy.  By my measurements it is larger than on April 24, 2022 with central necrosis.  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 This is similar to the previous April 2022 study.  No evidence of  arterial compromise related to malignancy.     PET 5/13/2022:     IMPRESSION:  1. Postoperative changes of prior laryngectomy and lymph node resection/radiation.  2. Masslike FDG avid lesion to the left of midline at the tongue base concerning for residual/recurrent disease.  3. Adjacent confluent nodular extension along the inferior left side of the mass.  4. No FDG avid lymphadenopathy or convincing additional sites of disease in the chest, abdomen or pelvis.     CT head 5/10/2022:  FINDINGS: Comparison to multiple prior exams. There is no acute intracranial hemorrhage, with no mass effect or abnormal extra-axial fluid. Mild scattered areas of nonspecific hypoattenuation involve the deep periventricular white matter, with gray-white differentiation maintained.     There is mild generalized prominence of the cortical sulci and ventricles. The cerebellum and brainstem are unremarkable. There are carotid siphon vascular calcifications. The visualized paranasal sinuses and mastoid air cells are clear. There is no acute calvarial fracture or scalp hematoma.     IMPRESSION: No acute intracranial hemorrhage or acute calvarial fracture     CT soft neck 4/24/2022;     Oral tongue/tongue base:  At the base of the tongue on the left there are findings of a heterogeneously enhancing mass measuring 2.1 cm highly concerning for a neoplastic process.     True and false cords:  Patient status post laryngectomy which has been performed in the interim. Soft tissue stranding in the lower neck likely related to a previous flat reconstruction. No enhancement or lymphadenopathy within the lower neck.     Lymph node assessment:Negative     Surrounding soft tissues:  Negative     Vasculature:  Negative     Osseous structures:  Negative     Lung apices:  Scarring involving the lung apices bilaterally likely related to previous radiation.     IMPRESSION:  1. Postoperative and radiation changes involving the lower neck.  2. Findings highly  concerning for a developing mass at the left base of the tongue enhancing 2.1 cm region. Direct visualization in this region would be of benefit.        CT soft neck  12/24/2021  IMPRESSION:  1.  Laryngeal mass as on prior exam. Laryngeal airway narrowing has not changed.  2.  No evidence for active hemorrhage.  3.  Partially visualized patchy groundglass infiltrates in both upper lobes. Also see CT chest report     CTA Chest 12/24/2021:  IMPRESSION:     1.  No pulmonary embolism.     2.  Soft tissue stranding in the region of the strap musculature with ill-defined hypodensity measuring 2.8 x 1.4 cm in the cervical midline.  Findings concerning for infectious process or abscess. Neoplastic process could have similar imaging characteristics.     3.  Suggested erosion of the proximal right parasymphyseal aspect of the thyroid shield.  Correlated with CT soft tissue neck findings. Findings could represent osteomyelitis. Neoplastic process cannot be excluded.     4.  Hepatic steatosis.  5.  Thickening of the gastric wall. Prominence of perigastric vasculature. Small hiatal hernia.     6.  Moderate stool burden.  Correlate for constipation.        PET 12/15/2021:  IMPRESSION:     Substantial qualitative and quantitative increase of hypermetabolic FDG activity associated with the larynx in this patient with known supraglottic neoplasm.     No evidence of FDG avid metastatic disease involving neck, chest, abdomen or pelvis.     PET 8/21/2020:     Impression:     1. Intensely hypermetabolic plaque-like mass along the left true vocal cord, compatible with known laryngeal carcinoma.  2. No findings of regional metastatic disease in the neck, or distant metastatic disease.        CT Chest 8/10/2020:     IMPRESSION:     No metastatic disease within the chest.  Three-vessel coronary artery calcification. Nondilated cardiac  chambers     CT Soft neck 7/22/2020:  IMPRESSION:  1. Slight interval increase in size of the previously  described  enhancing nodule along the anterior left vocal cord.  2. No pathologic lymphadenopathy.  3. Additional and incidental findings as noted above.     CT Soft neck  3/16/2020:     Impression:     6 mm enhancing focus involving the superior aspect of the left focal cord near the anterior commisure.  Recommend direct visualization for further evaluation of laryngeal polyp     Question of 2 mm submucosal lipoma involving the midportion of the superior aspect of the left vocal cord.     Mild arteriosclerosis involving the brachiocephalic arteries and carotid arteries     Mild degenerative change of the cervical spine        I have reviewed all available lab results and radiology reports.    Assessment/Plan:   (1) 73 y.o. 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     - Patient has been under the care of Dr Khoobehi with [facility] and Dr Portillo with rad/onc.   - He was recently found to have a new primary cancer involving the base of his tongue in April 2022. He was deemed not to be a candidate for any further radiation and did not want to undergo any further surgery as this would necessitate a glossectomy procedure.   - He has been on adjuvant chemotherapy per direction of Dr Khoobehi and has had 3 cycles. His therapy course has been complicated with persistent diarrhea and N/V.      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 He saw Dr Lucero with Rad/onc this am. They are going to  present his case to H&[facility] Tumor Board at [facility] and plans to see Dr James about surgical options. If he is not a surgical candidate then will proceed with cisplatin and XRT combine therapy.     10/6/2022:  - He saw Dr Lucero with Rad/onc, and Dr James and his case was presented to H&N Tumor Board at [facility] and  he general consensus was to proceed to surgery.  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   - He had his portacath removed on 11/28.   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, keratinizing type, moderate to poorly differentiated tumor  - Four LN's were all negative  - he did have a positive left superior pharyngeal margin  - pT3 pN0  - reviewed the latest NCCN guidelines again from Version 1.2023; he may need some further systemic therapy due to the margin  - check on Rad/onc follow-up     1/23/2023:  - s/p new portacath placed on 1/12/2023 with Dr Le  - starting combined chemotherapy and XRT - cisplatin  - s/p chemotherapy school with [facility]    2/6/2023:  - he seems to be tolerating the chemotherapy well at this time  - continued with concomitant therapy with XRT    2/22/2023:  - he seems to be tolerating the chemotherapy well at this time  - continued with concomitant therapy with XRT  - labs relatively adequate  - will check on his refills    3/6/2023:  - finishing up XRT this comking Thursday  - last chemotherapy today    4/3/2023:  - He has since completed chemotherapy and  XRT.  He seems to have tolerated the regimen  fairly well with no new complaints.  Some weight loss but reports he is staying hydrated. He does have some occasional GRIFFIN.   - He is seeing ENT tomorrow with Dr James    5/31/2023:  - He has since completed chemotherapy and  XRT.  He seems to have tolerated the regimen fairly well . He saw Dr James with ENT on 5/2/2023 and had repeat scope with good report per patient.  - He had recent PET yesterday on 5/30 which overall looks adequate    8/23/2023:  - sees Dr James again on 9/5/2023  - will set up repeat CT neck and chest for Sept 2023  - reach out to dietary about foods he can eat that are higher in iron  - consider IV iron x2 (he can not swallow pills)  - see if we can renew PT    11/15/2023:  - He previously had completed chemotherapy and  XRT.  He seems to have tolerated the regimen fairly well . He saw Dr James with ENT on 11/72023 and had repeat scope with good report per patient.  - He last saw Dr Patterson on 8/3/2023 and sees him again in Jan 2024, Dr Portillo on 6/19/2023  - He had last PET on 5/30  and CT Chest/Neck in Sept 2023  - he missed one IV iron  - due for up to date labs         (2) Hx/of Laryngeal cancer in Aug 2020 stage II (cT2 N0)  - s/p radiation followed by bilateral neck dissection and total thyroidectomy     Brief Oncology Summary for his laryngeal CA hx:     Patient was noted to initially have a suspicious lesion on the left true vocal cord by laryngoscopy with Dr Xiao in March 2020 with biopsy showing severe dysplastic changes without definitive invasive process. He had a CT scan on 3/16/2020 which showed a 6mm nodule on the left vocal cord. A repeat Ct scan four months later on 7/22/2020 showed the nodule enlarged to 1.4 x 1.1 cm in size. Patient was then seen by Dr Noel and underwent repeat scope in Aug 2020 who found a bulky deeply invading tumor which was debulked and the pathology coming back moderately differentiated SCCA without lymphovascular or perineural invasion. Hs case  was subsequently presented to the tumor board and the consensus was to proceed with radiation. He completed XRT on 10/30/2020 and proceeded with regular laryngoscopy surveillance. He eventually required salvage laryngectomy and neck dissection with flap with Dr James on Jan 6th 2022. 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 Fifty lymph nodes were removed which were all negative. Pathology TNM was pT4a, pN0. Patient did not require any adjuvant therapy afterwards.      (2) HTN and hypercholesterolemia     (3) Paroxysmal atrial fibrillation, V tach     (4) DM II     (5) B12 deficiency      (6) Fatty liver disease, GERD           VISIT DIAGNOSES:      Malignant neoplasm of base of tongue    Larynx cancer    Iron deficiency anemia due to chronic blood loss    Chemotherapy-induced neutropenia    Laryngeal cancer    Iron deficiency anemia, unspecified iron deficiency anemia type    Cancer related pain    Abnormal CT scan, neck          PLAN:  continue with ENT and rad/onc f/u as directed by them respectively - check on f/u with Dr Portillo; repeat PET in Jan 2024  Check up to date labs and resume IV iron as needed   s/p chemotherapy school with [facility] - discussed the side-effect profile, provided literature and obtained consents  F/u Rad/onc follow-up as directed  Check labs monthly for now  F/u with PCP, ENT, etc  Dietician f/u on the tube feeds     RTC in 12 weeks with myself  Fax note to  Bandar/Dameon Lucero Hasney, Yesenia Gomez       Discussion:     COVID-19 Discussion:     I had long discussion with patient and any applicable family about the COVID-19 coronavirus epidemic and the recommended precautions with regard to cancer and/or hematology patients. I have re-iterated the CDC recommendations for adequate hand washing, use of hand -like products, and coughing  "into elbow, etc. In addition, especially for our patients who are on chemotherapy and/or our otherwise immunocompromised patients, I have recommended avoidance of crowds, including movie theaters, restaurants, churches, etc. I have recommended avoidance of any sick or symptomatic family members and/or friends. I have also recommended avoidance of any raw and unwashed food products, and general avoidance of food items that have not been prepared by themselves. The patient has been asked to call us immediately with any symptom developments, issues, questions or other general concerns.         Pathology Discussion:     I reviewed and discussed the pathology report(s) and radiograph reports (if available) in as simple to understand and/or laymen's terms to the best of my ability. I had an indepth conversation with the patient and went over the patient's individual diagnosis based on the information that was currently available. I discussed the TNM staging process with regard to the patient's particular cancer type, and the calculated stage based on the currently available TNM data and literature. I discussed the available prognostic data with regard to the current staging information and how it relates to the prognosis of their particular neoplastic process.          NCCN Guidelines:     I discussed the available treatment option(s) in accordance with the latest literature from the NCCN Clinical Practice Guidelines for the patient's particular type of cancer disorder. The NCCN Guidelines provide a "document evidence-based (and) consensus-driven management" of the care of oncology patients. The treatment recommendations were made not only in accordance to the NCCN guidelines, but also factored in to account the patient's overall age, condition, performance status and their medical co-morbidities. I went over the risks and benefits of the the treatment options (if any could be made) with regard to their particular cancer " type, their cancer stage, their age, and their co-morbidities.         Chemotherapy Discussion:      I discussed the available treatment option(s) in accordance with the latest/current national evidence-based guidelines (NCCN, UpToDate, NCI, ASCO, etc where applicable), their overall age/condition and their co-morbidities. I also went over the risks and benefits of the chemotherapy with regard to their particular cancer type, their cancer stage, their age/condition, and their co-morbidities. I provided literature on the chemotherapy regimen and discussed the chemotherapy side-effect profiles of the drug(s). I discussed the importance of compliance with obtaining and monitoring weekly lab work, and went over the potential hematopathology issues and risks with anemia, leucopenia and thrombocytopenia that can occur with chemotherapy. I discussed the potential risks of liver and kidney damage, which could be permanent and could necessitate dialysis long-term if kidney failure developed. I discussed the emetic and/or diarrheal potential of the regimen and the potential need for use of antiemetic and anti-diarrheal medications. I discussed the risk for development of anaphylactic shock, bronchospasm, dysrhythmia, and respiratory/cardiovascular arrest and/or failure. I discussed the potential risks for development of alopecia, cold sensory issues, ringing in ears, vertigo, cataracts, glaucoma, and neuropathy, all of which could end up being chronic and life-long. Some chemotherpyI discussed the risks of hand-foot syndrome and rashes, and development of other autoimmune mediated processes such as pneumonitis, hepatitis, and colitis which could be life threatening. I discussed the risks of the potential development of a rare but fatal viral mediated disease known as PML (Progressive Multifocal Leukoencephalopathy), and risk of future development of leukemia and/or lymphoma from use of certain chemotherapy agents. I discussed  the need for neutropenic precautions, basic hygiene/sanitation behaviors and dietary restrictions.    The patient's consent has been obtained to proceed with the chemotherapy.The patient will be referred to Chemotherapy School /[facility] for training and education on chemotherapy, use of antiemetics and/or anti-diarrheals, use of NSAID's, potential chemotherapy side-effects, and any specific recommendations and precautions with the particular chemotherapy agents.      I answered all of the patient's (and family's, if applicable) questions to the best of my ability and to their complete satisfaction. The patient acknowledged full understanding of the risks, recommendations and plan(s).     I have explained and the patient understands all of  the current recommendation(s). I have answered all of their questions to the best of my ability and to their complete satisfaction.         I spent over 25 mins of time with the patient. Reviewed results of the recently ordered labs, tests and studies; made directives with regards to the results. Over half of this time was spent couseling and coordinating care.    I have explained all of the above in detail and the patient understands all of the current recommendation(s). I have answered all of their questions to the best of my ability and to their complete satisfaction.   The patient is to continue with the current management plan.            Electronically signed by Venu Tamez MD                               Answers submitted by the patient for this visit:  Review of Systems Questionnaire (Submitted on 11/10/2023)  appetite change : No  unexpected weight change: No  mouth sores: No  visual disturbance: No  cough: No  shortness of breath: No  chest pain: No  abdominal pain: No  diarrhea: No  frequency: No  back pain: No  rash: No  headaches: No  adenopathy: No  nervous/ anxious: No

## 2023-11-15 ENCOUNTER — OFFICE VISIT (OUTPATIENT)
Dept: HEMATOLOGY/ONCOLOGY | Facility: CLINIC | Age: 73
End: 2023-11-15
Payer: MEDICARE

## 2023-11-15 ENCOUNTER — PATIENT MESSAGE (OUTPATIENT)
Dept: SURGICAL ONCOLOGY | Facility: CLINIC | Age: 73
End: 2023-11-15
Payer: MEDICARE

## 2023-11-15 VITALS
DIASTOLIC BLOOD PRESSURE: 74 MMHG | TEMPERATURE: 98 F | SYSTOLIC BLOOD PRESSURE: 114 MMHG | HEIGHT: 66 IN | RESPIRATION RATE: 16 BRPM | HEART RATE: 64 BPM | WEIGHT: 149.81 LBS | BODY MASS INDEX: 24.08 KG/M2

## 2023-11-15 DIAGNOSIS — C32.9 LARYNX CANCER: ICD-10-CM

## 2023-11-15 DIAGNOSIS — D70.1 CHEMOTHERAPY-INDUCED NEUTROPENIA: ICD-10-CM

## 2023-11-15 DIAGNOSIS — T45.1X5A CHEMOTHERAPY-INDUCED NEUTROPENIA: ICD-10-CM

## 2023-11-15 DIAGNOSIS — C01 MALIGNANT NEOPLASM OF BASE OF TONGUE: Primary | ICD-10-CM

## 2023-11-15 DIAGNOSIS — D50.9 IRON DEFICIENCY ANEMIA, UNSPECIFIED IRON DEFICIENCY ANEMIA TYPE: ICD-10-CM

## 2023-11-15 DIAGNOSIS — D50.0 IRON DEFICIENCY ANEMIA DUE TO CHRONIC BLOOD LOSS: ICD-10-CM

## 2023-11-15 DIAGNOSIS — G89.3 CANCER RELATED PAIN: ICD-10-CM

## 2023-11-15 DIAGNOSIS — C32.9 LARYNGEAL CANCER: ICD-10-CM

## 2023-11-15 DIAGNOSIS — R93.89 ABNORMAL CT SCAN, NECK: ICD-10-CM

## 2023-11-15 PROCEDURE — 3066F NEPHROPATHY DOC TX: CPT | Mod: CPTII,S$GLB,, | Performed by: INTERNAL MEDICINE

## 2023-11-15 PROCEDURE — 3078F PR MOST RECENT DIASTOLIC BLOOD PRESSURE < 80 MM HG: ICD-10-PCS | Mod: CPTII,S$GLB,, | Performed by: INTERNAL MEDICINE

## 2023-11-15 PROCEDURE — 3288F PR FALLS RISK ASSESSMENT DOCUMENTED: ICD-10-PCS | Mod: CPTII,S$GLB,, | Performed by: INTERNAL MEDICINE

## 2023-11-15 PROCEDURE — 1101F PR PT FALLS ASSESS DOC 0-1 FALLS W/OUT INJ PAST YR: ICD-10-PCS | Mod: CPTII,S$GLB,, | Performed by: INTERNAL MEDICINE

## 2023-11-15 PROCEDURE — 3078F DIAST BP <80 MM HG: CPT | Mod: CPTII,S$GLB,, | Performed by: INTERNAL MEDICINE

## 2023-11-15 PROCEDURE — 1159F PR MEDICATION LIST DOCUMENTED IN MEDICAL RECORD: ICD-10-PCS | Mod: CPTII,S$GLB,, | Performed by: INTERNAL MEDICINE

## 2023-11-15 PROCEDURE — 99214 OFFICE O/P EST MOD 30 MIN: CPT | Mod: S$GLB,,, | Performed by: INTERNAL MEDICINE

## 2023-11-15 PROCEDURE — 3288F FALL RISK ASSESSMENT DOCD: CPT | Mod: CPTII,S$GLB,, | Performed by: INTERNAL MEDICINE

## 2023-11-15 PROCEDURE — 3074F SYST BP LT 130 MM HG: CPT | Mod: CPTII,S$GLB,, | Performed by: INTERNAL MEDICINE

## 2023-11-15 PROCEDURE — 3061F PR NEG MICROALBUMINURIA RESULT DOCUMENTED/REVIEW: ICD-10-PCS | Mod: CPTII,S$GLB,, | Performed by: INTERNAL MEDICINE

## 2023-11-15 PROCEDURE — 3074F PR MOST RECENT SYSTOLIC BLOOD PRESSURE < 130 MM HG: ICD-10-PCS | Mod: CPTII,S$GLB,, | Performed by: INTERNAL MEDICINE

## 2023-11-15 PROCEDURE — 99214 PR OFFICE/OUTPT VISIT, EST, LEVL IV, 30-39 MIN: ICD-10-PCS | Mod: S$GLB,,, | Performed by: INTERNAL MEDICINE

## 2023-11-15 PROCEDURE — 1159F MED LIST DOCD IN RCRD: CPT | Mod: CPTII,S$GLB,, | Performed by: INTERNAL MEDICINE

## 2023-11-15 PROCEDURE — 3008F BODY MASS INDEX DOCD: CPT | Mod: CPTII,S$GLB,, | Performed by: INTERNAL MEDICINE

## 2023-11-15 PROCEDURE — 1160F PR REVIEW ALL MEDS BY PRESCRIBER/CLIN PHARMACIST DOCUMENTED: ICD-10-PCS | Mod: CPTII,S$GLB,, | Performed by: INTERNAL MEDICINE

## 2023-11-15 PROCEDURE — 3061F NEG MICROALBUMINURIA REV: CPT | Mod: CPTII,S$GLB,, | Performed by: INTERNAL MEDICINE

## 2023-11-15 PROCEDURE — 1160F RVW MEDS BY RX/DR IN RCRD: CPT | Mod: CPTII,S$GLB,, | Performed by: INTERNAL MEDICINE

## 2023-11-15 PROCEDURE — 1101F PT FALLS ASSESS-DOCD LE1/YR: CPT | Mod: CPTII,S$GLB,, | Performed by: INTERNAL MEDICINE

## 2023-11-15 PROCEDURE — 1126F PR PAIN SEVERITY QUANTIFIED, NO PAIN PRESENT: ICD-10-PCS | Mod: CPTII,S$GLB,, | Performed by: INTERNAL MEDICINE

## 2023-11-15 PROCEDURE — 3044F PR MOST RECENT HEMOGLOBIN A1C LEVEL <7.0%: ICD-10-PCS | Mod: CPTII,S$GLB,, | Performed by: INTERNAL MEDICINE

## 2023-11-15 PROCEDURE — 3008F PR BODY MASS INDEX (BMI) DOCUMENTED: ICD-10-PCS | Mod: CPTII,S$GLB,, | Performed by: INTERNAL MEDICINE

## 2023-11-15 PROCEDURE — 1126F AMNT PAIN NOTED NONE PRSNT: CPT | Mod: CPTII,S$GLB,, | Performed by: INTERNAL MEDICINE

## 2023-11-15 PROCEDURE — 3044F HG A1C LEVEL LT 7.0%: CPT | Mod: CPTII,S$GLB,, | Performed by: INTERNAL MEDICINE

## 2023-11-15 PROCEDURE — 3066F PR DOCUMENTATION OF TREATMENT FOR NEPHROPATHY: ICD-10-PCS | Mod: CPTII,S$GLB,, | Performed by: INTERNAL MEDICINE

## 2023-11-15 RX ORDER — CALCITRIOL 1 UG/ML
0.25 SOLUTION ORAL DAILY
Qty: 15 ML | Refills: 3 | Status: SHIPPED | OUTPATIENT
Start: 2023-11-15 | End: 2024-03-19

## 2023-11-16 ENCOUNTER — OFFICE VISIT (OUTPATIENT)
Dept: OPHTHALMOLOGY | Facility: CLINIC | Age: 73
End: 2023-11-16
Payer: MEDICARE

## 2023-11-16 DIAGNOSIS — Z98.42 STATUS POST CATARACT SURGERY, LEFT: Primary | ICD-10-CM

## 2023-11-16 PROCEDURE — 1126F PR PAIN SEVERITY QUANTIFIED, NO PAIN PRESENT: ICD-10-PCS | Mod: CPTII,S$GLB,, | Performed by: OPHTHALMOLOGY

## 2023-11-16 PROCEDURE — 1159F PR MEDICATION LIST DOCUMENTED IN MEDICAL RECORD: ICD-10-PCS | Mod: CPTII,S$GLB,, | Performed by: OPHTHALMOLOGY

## 2023-11-16 PROCEDURE — 3066F PR DOCUMENTATION OF TREATMENT FOR NEPHROPATHY: ICD-10-PCS | Mod: CPTII,S$GLB,, | Performed by: OPHTHALMOLOGY

## 2023-11-16 PROCEDURE — 3044F HG A1C LEVEL LT 7.0%: CPT | Mod: CPTII,S$GLB,, | Performed by: OPHTHALMOLOGY

## 2023-11-16 PROCEDURE — 99999 PR PBB SHADOW E&M-EST. PATIENT-LVL III: CPT | Mod: PBBFAC,,, | Performed by: OPHTHALMOLOGY

## 2023-11-16 PROCEDURE — 3044F PR MOST RECENT HEMOGLOBIN A1C LEVEL <7.0%: ICD-10-PCS | Mod: CPTII,S$GLB,, | Performed by: OPHTHALMOLOGY

## 2023-11-16 PROCEDURE — 99024 POSTOP FOLLOW-UP VISIT: CPT | Mod: S$GLB,,, | Performed by: OPHTHALMOLOGY

## 2023-11-16 PROCEDURE — 1126F AMNT PAIN NOTED NONE PRSNT: CPT | Mod: CPTII,S$GLB,, | Performed by: OPHTHALMOLOGY

## 2023-11-16 PROCEDURE — 1160F PR REVIEW ALL MEDS BY PRESCRIBER/CLIN PHARMACIST DOCUMENTED: ICD-10-PCS | Mod: CPTII,S$GLB,, | Performed by: OPHTHALMOLOGY

## 2023-11-16 PROCEDURE — 3066F NEPHROPATHY DOC TX: CPT | Mod: CPTII,S$GLB,, | Performed by: OPHTHALMOLOGY

## 2023-11-16 PROCEDURE — 92015 PR REFRACTION: ICD-10-PCS | Mod: S$GLB,,, | Performed by: OPHTHALMOLOGY

## 2023-11-16 PROCEDURE — 99999 PR PBB SHADOW E&M-EST. PATIENT-LVL III: ICD-10-PCS | Mod: PBBFAC,,, | Performed by: OPHTHALMOLOGY

## 2023-11-16 PROCEDURE — 99024 PR POST-OP FOLLOW-UP VISIT: ICD-10-PCS | Mod: S$GLB,,, | Performed by: OPHTHALMOLOGY

## 2023-11-16 PROCEDURE — 1159F MED LIST DOCD IN RCRD: CPT | Mod: CPTII,S$GLB,, | Performed by: OPHTHALMOLOGY

## 2023-11-16 PROCEDURE — 92015 DETERMINE REFRACTIVE STATE: CPT | Mod: S$GLB,,, | Performed by: OPHTHALMOLOGY

## 2023-11-16 PROCEDURE — 3061F PR NEG MICROALBUMINURIA RESULT DOCUMENTED/REVIEW: ICD-10-PCS | Mod: CPTII,S$GLB,, | Performed by: OPHTHALMOLOGY

## 2023-11-16 PROCEDURE — 3061F NEG MICROALBUMINURIA REV: CPT | Mod: CPTII,S$GLB,, | Performed by: OPHTHALMOLOGY

## 2023-11-16 PROCEDURE — 1160F RVW MEDS BY RX/DR IN RCRD: CPT | Mod: CPTII,S$GLB,, | Performed by: OPHTHALMOLOGY

## 2023-11-16 NOTE — PROGRESS NOTES
HPI    Pt presents for one month post op PCIOL OU     States eyes are doing well     No ocular complaints.    Last edited by Dana Gibbons on 11/16/2023 10:45 AM.            Assessment /Plan     For exam results, see Encounter Report.    Status post cataract surgery, left      POM1 OS, POM2 OD  Doing well  New glasses Rx today with previous prisms    Recommend add artificial tears    F/u 1 year, routine exam  Dr. Pham

## 2023-11-17 ENCOUNTER — LAB VISIT (OUTPATIENT)
Dept: LAB | Facility: HOSPITAL | Age: 73
End: 2023-11-17
Attending: INTERNAL MEDICINE
Payer: MEDICARE

## 2023-11-17 DIAGNOSIS — D50.9 IRON DEFICIENCY ANEMIA, UNSPECIFIED IRON DEFICIENCY ANEMIA TYPE: ICD-10-CM

## 2023-11-17 LAB
ALBUMIN SERPL BCP-MCNC: 4.4 G/DL (ref 3.5–5.2)
ALP SERPL-CCNC: 133 U/L (ref 55–135)
ALT SERPL W/O P-5'-P-CCNC: 22 U/L (ref 10–44)
ANION GAP SERPL CALC-SCNC: 10 MMOL/L (ref 8–16)
AST SERPL-CCNC: 23 U/L (ref 10–40)
BASOPHILS # BLD AUTO: 0.02 K/UL (ref 0–0.2)
BASOPHILS NFR BLD: 0.4 % (ref 0–1.9)
BILIRUB SERPL-MCNC: 0.8 MG/DL (ref 0.1–1)
BUN SERPL-MCNC: 25 MG/DL (ref 8–23)
CALCIUM SERPL-MCNC: 8.5 MG/DL (ref 8.7–10.5)
CHLORIDE SERPL-SCNC: 107 MMOL/L (ref 95–110)
CO2 SERPL-SCNC: 23 MMOL/L (ref 23–29)
CREAT SERPL-MCNC: 1.1 MG/DL (ref 0.5–1.4)
DIFFERENTIAL METHOD: ABNORMAL
EOSINOPHIL # BLD AUTO: 0.4 K/UL (ref 0–0.5)
EOSINOPHIL NFR BLD: 7.6 % (ref 0–8)
ERYTHROCYTE [DISTWIDTH] IN BLOOD BY AUTOMATED COUNT: 13.4 % (ref 11.5–14.5)
EST. GFR  (NO RACE VARIABLE): >60 ML/MIN/1.73 M^2
FERRITIN SERPL-MCNC: 323.3 NG/ML (ref 20–300)
GLUCOSE SERPL-MCNC: 117 MG/DL (ref 70–110)
HCT VFR BLD AUTO: 37.9 % (ref 40–54)
HGB BLD-MCNC: 12.5 G/DL (ref 14–18)
IMM GRANULOCYTES # BLD AUTO: 0.03 K/UL (ref 0–0.04)
IMM GRANULOCYTES NFR BLD AUTO: 0.6 % (ref 0–0.5)
IRON SERPL-MCNC: 127 UG/DL (ref 45–160)
LYMPHOCYTES # BLD AUTO: 0.8 K/UL (ref 1–4.8)
LYMPHOCYTES NFR BLD: 15.3 % (ref 18–48)
MCH RBC QN AUTO: 32.1 PG (ref 27–31)
MCHC RBC AUTO-ENTMCNC: 33 G/DL (ref 32–36)
MCV RBC AUTO: 97 FL (ref 82–98)
MONOCYTES # BLD AUTO: 0.4 K/UL (ref 0.3–1)
MONOCYTES NFR BLD: 7.2 % (ref 4–15)
NEUTROPHILS # BLD AUTO: 3.4 K/UL (ref 1.8–7.7)
NEUTROPHILS NFR BLD: 68.9 % (ref 38–73)
NRBC BLD-RTO: 0 /100 WBC
PLATELET # BLD AUTO: 130 K/UL (ref 150–450)
PMV BLD AUTO: 11.1 FL (ref 9.2–12.9)
POTASSIUM SERPL-SCNC: 4.4 MMOL/L (ref 3.5–5.1)
PROT SERPL-MCNC: 7.2 G/DL (ref 6–8.4)
RBC # BLD AUTO: 3.9 M/UL (ref 4.6–6.2)
SATURATED IRON: 41 % (ref 20–50)
SODIUM SERPL-SCNC: 140 MMOL/L (ref 136–145)
TOTAL IRON BINDING CAPACITY: 309 UG/DL (ref 250–450)
TRANSFERRIN SERPL-MCNC: 221 MG/DL (ref 200–375)
WBC # BLD AUTO: 4.89 K/UL (ref 3.9–12.7)

## 2023-11-17 PROCEDURE — 84466 ASSAY OF TRANSFERRIN: CPT | Performed by: INTERNAL MEDICINE

## 2023-11-17 PROCEDURE — 82728 ASSAY OF FERRITIN: CPT | Performed by: INTERNAL MEDICINE

## 2023-11-17 PROCEDURE — 83540 ASSAY OF IRON: CPT | Performed by: INTERNAL MEDICINE

## 2023-11-17 PROCEDURE — 36415 COLL VENOUS BLD VENIPUNCTURE: CPT | Performed by: INTERNAL MEDICINE

## 2023-11-17 PROCEDURE — 80053 COMPREHEN METABOLIC PANEL: CPT | Performed by: INTERNAL MEDICINE

## 2023-11-17 PROCEDURE — 85025 COMPLETE CBC W/AUTO DIFF WBC: CPT | Performed by: INTERNAL MEDICINE

## 2023-11-21 ENCOUNTER — OFFICE VISIT (OUTPATIENT)
Dept: OTOLARYNGOLOGY | Facility: CLINIC | Age: 73
End: 2023-11-21
Payer: MEDICARE

## 2023-11-21 VITALS
WEIGHT: 147.5 LBS | HEART RATE: 60 BPM | SYSTOLIC BLOOD PRESSURE: 102 MMHG | DIASTOLIC BLOOD PRESSURE: 64 MMHG | HEIGHT: 66 IN | BODY MASS INDEX: 23.7 KG/M2

## 2023-11-21 DIAGNOSIS — K11.7 PTYALISM: Primary | ICD-10-CM

## 2023-11-21 PROCEDURE — 99999 PR PBB SHADOW E&M-EST. PATIENT-LVL III: ICD-10-PCS | Mod: PBBFAC,,, | Performed by: OTOLARYNGOLOGY

## 2023-11-21 PROCEDURE — 99999 PR PBB SHADOW E&M-EST. PATIENT-LVL III: CPT | Mod: PBBFAC,,, | Performed by: OTOLARYNGOLOGY

## 2023-11-21 PROCEDURE — 99499 NO LOS: ICD-10-PCS | Mod: S$GLB,,, | Performed by: OTOLARYNGOLOGY

## 2023-11-21 PROCEDURE — 99499 UNLISTED E&M SERVICE: CPT | Mod: S$GLB,,, | Performed by: OTOLARYNGOLOGY

## 2023-11-21 NOTE — PROGRESS NOTES
Cyrus aBtres Jr. for Botox injection into bilateral parotid glands for assistance with excessive saliva.  No complaints.      Procedure:  Bilateral parotid sites prepped with alcohol.  50 units of Botox injected into both parotid glands.  He tolerated the procedure well.    Return as scheduled for surveillance.

## 2023-11-28 ENCOUNTER — CLINICAL SUPPORT (OUTPATIENT)
Dept: REHABILITATION | Facility: HOSPITAL | Age: 73
End: 2023-11-28
Payer: MEDICARE

## 2023-11-28 DIAGNOSIS — G54.8 PHANTOM PAIN: Primary | ICD-10-CM

## 2023-11-28 DIAGNOSIS — G89.29 CHRONIC LOW BACK PAIN, UNSPECIFIED BACK PAIN LATERALITY, UNSPECIFIED WHETHER SCIATICA PRESENT: ICD-10-CM

## 2023-11-28 DIAGNOSIS — M54.50 CHRONIC LOW BACK PAIN, UNSPECIFIED BACK PAIN LATERALITY, UNSPECIFIED WHETHER SCIATICA PRESENT: ICD-10-CM

## 2023-11-28 DIAGNOSIS — R52 PHANTOM PAIN: Primary | ICD-10-CM

## 2023-11-28 PROCEDURE — 97810 ACUP 1/> WO ESTIM 1ST 15 MIN: CPT | Mod: PN

## 2023-11-28 PROCEDURE — 97811 ACUP 1/> W/O ESTIM EA ADD 15: CPT | Mod: PN

## 2023-12-01 ENCOUNTER — PATIENT MESSAGE (OUTPATIENT)
Dept: HEMATOLOGY/ONCOLOGY | Facility: CLINIC | Age: 73
End: 2023-12-01
Payer: MEDICARE

## 2023-12-03 ENCOUNTER — PATIENT MESSAGE (OUTPATIENT)
Dept: OTOLARYNGOLOGY | Facility: CLINIC | Age: 73
End: 2023-12-03
Payer: MEDICARE

## 2023-12-04 ENCOUNTER — OFFICE VISIT (OUTPATIENT)
Dept: RADIATION ONCOLOGY | Facility: CLINIC | Age: 73
End: 2023-12-04
Payer: MEDICARE

## 2023-12-04 VITALS
DIASTOLIC BLOOD PRESSURE: 75 MMHG | WEIGHT: 145 LBS | RESPIRATION RATE: 16 BRPM | BODY MASS INDEX: 23.4 KG/M2 | OXYGEN SATURATION: 98 % | HEART RATE: 61 BPM | SYSTOLIC BLOOD PRESSURE: 136 MMHG

## 2023-12-04 DIAGNOSIS — C32.9 LARYNX CANCER: Primary | ICD-10-CM

## 2023-12-04 DIAGNOSIS — Z90.02 S/P LARYNGECTOMY: Primary | ICD-10-CM

## 2023-12-04 PROCEDURE — 3044F PR MOST RECENT HEMOGLOBIN A1C LEVEL <7.0%: ICD-10-PCS | Mod: CPTII,S$GLB,, | Performed by: RADIOLOGY

## 2023-12-04 PROCEDURE — 99215 PR OFFICE/OUTPT VISIT, EST, LEVL V, 40-54 MIN: ICD-10-PCS | Mod: S$GLB,,, | Performed by: RADIOLOGY

## 2023-12-04 PROCEDURE — 1101F PR PT FALLS ASSESS DOC 0-1 FALLS W/OUT INJ PAST YR: ICD-10-PCS | Mod: CPTII,S$GLB,, | Performed by: RADIOLOGY

## 2023-12-04 PROCEDURE — 1159F MED LIST DOCD IN RCRD: CPT | Mod: CPTII,S$GLB,, | Performed by: RADIOLOGY

## 2023-12-04 PROCEDURE — 3061F NEG MICROALBUMINURIA REV: CPT | Mod: CPTII,S$GLB,, | Performed by: RADIOLOGY

## 2023-12-04 PROCEDURE — 3061F PR NEG MICROALBUMINURIA RESULT DOCUMENTED/REVIEW: ICD-10-PCS | Mod: CPTII,S$GLB,, | Performed by: RADIOLOGY

## 2023-12-04 PROCEDURE — 1126F PR PAIN SEVERITY QUANTIFIED, NO PAIN PRESENT: ICD-10-PCS | Mod: CPTII,S$GLB,, | Performed by: RADIOLOGY

## 2023-12-04 PROCEDURE — 1101F PT FALLS ASSESS-DOCD LE1/YR: CPT | Mod: CPTII,S$GLB,, | Performed by: RADIOLOGY

## 2023-12-04 PROCEDURE — 3008F BODY MASS INDEX DOCD: CPT | Mod: CPTII,S$GLB,, | Performed by: RADIOLOGY

## 2023-12-04 PROCEDURE — 1160F RVW MEDS BY RX/DR IN RCRD: CPT | Mod: CPTII,S$GLB,, | Performed by: RADIOLOGY

## 2023-12-04 PROCEDURE — 99215 OFFICE O/P EST HI 40 MIN: CPT | Mod: S$GLB,,, | Performed by: RADIOLOGY

## 2023-12-04 PROCEDURE — 3066F PR DOCUMENTATION OF TREATMENT FOR NEPHROPATHY: ICD-10-PCS | Mod: CPTII,S$GLB,, | Performed by: RADIOLOGY

## 2023-12-04 PROCEDURE — 1160F PR REVIEW ALL MEDS BY PRESCRIBER/CLIN PHARMACIST DOCUMENTED: ICD-10-PCS | Mod: CPTII,S$GLB,, | Performed by: RADIOLOGY

## 2023-12-04 PROCEDURE — 3078F DIAST BP <80 MM HG: CPT | Mod: CPTII,S$GLB,, | Performed by: RADIOLOGY

## 2023-12-04 PROCEDURE — 1159F PR MEDICATION LIST DOCUMENTED IN MEDICAL RECORD: ICD-10-PCS | Mod: CPTII,S$GLB,, | Performed by: RADIOLOGY

## 2023-12-04 PROCEDURE — 3008F PR BODY MASS INDEX (BMI) DOCUMENTED: ICD-10-PCS | Mod: CPTII,S$GLB,, | Performed by: RADIOLOGY

## 2023-12-04 PROCEDURE — 3078F PR MOST RECENT DIASTOLIC BLOOD PRESSURE < 80 MM HG: ICD-10-PCS | Mod: CPTII,S$GLB,, | Performed by: RADIOLOGY

## 2023-12-04 PROCEDURE — 3044F HG A1C LEVEL LT 7.0%: CPT | Mod: CPTII,S$GLB,, | Performed by: RADIOLOGY

## 2023-12-04 PROCEDURE — 3075F PR MOST RECENT SYSTOLIC BLOOD PRESS GE 130-139MM HG: ICD-10-PCS | Mod: CPTII,S$GLB,, | Performed by: RADIOLOGY

## 2023-12-04 PROCEDURE — 3066F NEPHROPATHY DOC TX: CPT | Mod: CPTII,S$GLB,, | Performed by: RADIOLOGY

## 2023-12-04 PROCEDURE — 3288F PR FALLS RISK ASSESSMENT DOCUMENTED: ICD-10-PCS | Mod: CPTII,S$GLB,, | Performed by: RADIOLOGY

## 2023-12-04 PROCEDURE — 1126F AMNT PAIN NOTED NONE PRSNT: CPT | Mod: CPTII,S$GLB,, | Performed by: RADIOLOGY

## 2023-12-04 PROCEDURE — 3288F FALL RISK ASSESSMENT DOCD: CPT | Mod: CPTII,S$GLB,, | Performed by: RADIOLOGY

## 2023-12-04 PROCEDURE — 3075F SYST BP GE 130 - 139MM HG: CPT | Mod: CPTII,S$GLB,, | Performed by: RADIOLOGY

## 2023-12-04 RX ORDER — SCOLOPAMINE TRANSDERMAL SYSTEM 1 MG/1
1 PATCH, EXTENDED RELEASE TRANSDERMAL
Qty: 24 PATCH | Refills: 3 | Status: SHIPPED | OUTPATIENT
Start: 2023-12-04

## 2023-12-04 NOTE — PROGRESS NOTES
Cyrus Batres Jr.  [id number]  1950 12/4/2023  No referring provider defined for this encounter.    DIAGNOSIS:  Cancer Staging   Larynx cancer  Staging form: Larynx - Glottis, AJCC 8th Edition  - Clinical stage from 8/6/2020: Stage II (cT2, cN0, cM0) - Signed by Fariha Muñoz NP on 8/6/2020  - Pathologic stage from 1/20/2022: Stage LOU (pT4a, pN0, cM0) - Signed by Fariha Muñoz NP on 1/20/2022  Staging form: Pharynx - P16 Negative Oropharynx, AJCC 8th Edition  - Clinical: Stage II (rcT2, cN0, cM0) - Signed by Matheus Portillo Jr., MD on 5/30/2022    Malignant neoplasm of base of tongue  Staging form: Pharynx - P16 Negative Oropharynx, AJCC 8th Edition  - Clinical stage from 5/20/2022: Stage II (cT2, cN0, cM0, p16-) - Signed by Saúl Kessler MD on 7/7/2022    REASON FOR VISIT: Routine scheduled follow-up.    HISTORY OF PRESENT ILLNESS:   Cyrus Batres Jr. is a 72 y.o. male never smoker who presented with intermittent hoarseness and weakening of the voice throughout the day with persistent cough. He has a +FHx of similar diagnosis in his father who was a smoker (he reports benign--laryngeal polyps?).     Patient was evaluated by Dr. Xiao with DFL noting irregularity at L TVC and short interval f/u with microlaryngoscopy where he noted the lesion at the superior surface of the left vocal cord without definitive invasion of the anterior commissure and possibly 1 mm of subglottic extension. Biopsy from the left true vocal cord demonstrated severe dysplastic changes without definitive invasive carcinoma though suspicious.  The left anterior commissure biopsy demonstrated moderate dysplasia without malignancy. CT of the neck and soft tissues appreciating an enhancing 6 mm focus at the superior aspect of the left vocal cord near the anterior commissure.     Follow-up DFL was suspicious for invasive tumor with four month follow-up CT of the neck and soft tissues demonstrating enlargement to 1.4 x 1.1 x 0.7 cm  without evidence of pathologic lymphadenopathy.  Dr. Xiao referred to Dr. Noel who performed FLV suspicious for malignancy then microlaryngoscopy noting bulky tumor extending into the left true vocal fold, crossing the anterior commissure to involve the left false vocal cord with infraglottic extent x 8 mm. Aggressive debulking revealed the tumor to have deeply invaded with pathology from left true vocal cord demonstrating moderately differentiated SCCA without lymphovascular or perineural invasion.     CT of the chest was clear.  His case was reviewed by multidisciplinary tumor board that recommended definitive radiotherapy.  I discussed in detail with Dr. Noel who reports extensive debulking of the tumor.     Mr. Batres completed IMRT to his larynx and bilateral necks via SiB totaling 6996 cGy on 10/30/2020.     Following completion of radiotherapy patient has continued under the care of Dr. Noel requiring microlaryngoscopy with stripping of persistent disease.  He has continued on surveillance with suggestion of progressive disease that progressed to hemoptysis.      He was ultimately taken for salvage laryngectomy by Dr. James:              - 4.2cm g1-2 keratinizing SCCa invading the left lobe of thyroid gland.              - margin close at 1 mm near right thyroid cartilage              - 0/50 LNs              - pT4aN0     I met with him postoperatively as did Dr. Khoobehi, and no further adjuvant radiotherapy or systemic therapy was recommended.     Patient has been following with PT/ST and presented with hemoptysis.  CT of the neck was remarkable for 2.1 cm enhancing mass at the left base of tongue.  Dr. James took the patient for DL revealing a mass at the base of the tongue at junction of the neopharynx, crossing midline and extending to the lateral pharyngeal wall, biopsy confirming a poorly differentiated SCCA, p16 negative.  PET-CT confirmed a 2.9 x 2.5 cm left base of tongue mass, SUV 11.4 with  inferior nodular extension measuring 1.3 mm in size, SUV 8.3.  There was no evidence of regional lymphadenopathy or distant disease.     He has presented multiple times to the hospital with recurrent bleeding on Eliquis.  CTA neck was negative for arterial involvement. He was evaluated by IR for potential lingual artery embolization, ultimately without intervention.  Arteriogram demonstrated hypervascular tumor without extravasation, pseudoaneurysm or fistula.    I met with him in May 2022 and demonstrated recurrent disease to be within high-dose region with risk of reirradiation, and advised consideration of salvage surgery.  He met with Dr. James and ultimately declined surgery.  After discussing with Drs. Khoobehi and Checo, he was placed on systemic therapy of carboplatin, 5 FU, Keytruda completing 3 cycles. CTA N demonstrated progression of the hypo pharyngeal mass, extending to the floor of the mouth on the left and medial margin of the mandible.  PET-CT confirmed no distant disease.  He was seen by my partner Dr. Lucero and consulted with Dr. Tamez before returning to Dr. James, ultimately agreeing to resection.    He was taken for pharyngectomy and glossectomy with ALT free flap by Haleigh James and Tanner, 11/09/2022:  - extensive tumor extending out into the neck within level 2.  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  He was discharged and was seen by Dr. Tamez who is considering him for further systemic therapy due to positive margin.  He returns to discuss.  Follow-up with Dr. James today who advised would benefit from RT.    Completed adjuvant  chemoradiation therapy with platinum sensitization to 66 Gy ending March 9, 2023.  Treatment very well tolerated.    INTERVAL HISTORY:   Patient denies fever, chills, chest pain, shortness of breath, cough or hemoptysis.  He continues to have difficulty expressing the mucus due to his surgery and radiotherapy.  Increased mucus production correlates with lymphedema flare-ups.  Five cartons of feeds daily with increased activity level and denies fatigue or endurance issues.    11/7/23 Dr. James: ZAY; + botox for sialorrhea; f/u 3mos    Following with ENDO: hypothyroid on synthroid; hypoCa    Review of systems otherwise negative unless indicated in HPI/interval history.    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    PEG (percutaneous endoscopic gastrostomy) adjustment/replacement/removal 11/22/2022    Postoperative 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    Tongue cancer     Tracheostomy dependence     Type 2 diabetes mellitus, without long-term current use of insulin 11/22/2022     Past Surgical History:   Procedure Laterality Date    CATARACT EXTRACTION W/  INTRAOCULAR LENS IMPLANT Right 8/16/2023    Procedure: CEIOL OD  NPO-peg;  Surgeon: Stoney Munoz MD;  [facility];  Service: Ophthalmology;  Laterality: Right;    CATARACT EXTRACTION W/  INTRAOCULAR LENS IMPLANT Left 10/18/2023    Procedure: CEIOL OS npo peg;  Surgeon: Stoney Munoz MD;  [facility];  Service: Ophthalmology;  Laterality: Left;    DIRECT LARYNGOBRONCHOSCOPY N/A 12/27/2021    Procedure: LARYNGOSCOPY,  DIRECT, WITH BRONCHOSCOPY;  Surgeon: Flex Espinosa MD;  [facility];  Service: ENT;  Laterality: N/A;    DIRECT LARYNGOSCOPY  11/9/2022    Procedure: LARYNGOSCOPY, DIRECT;  Surgeon: Jesse James MD;  [facility];  Service: ENT;;    DISSECTION OF NECK Bilateral 1/6/2022    Procedure: DISSECTION, NECK;  Surgeon: Jesse James MD;  [facility];  Service: ENT;  Laterality: Bilateral;    DISSECTION OF NECK Bilateral 11/9/2022    Procedure: DISSECTION, NECK;  Surgeon: Jesse James MD;  [facility];  Service: ENT;  Laterality: Bilateral;    FLAP PROCEDURE Right 1/6/2022    Procedure: CREATION, FREE FLAP;  Surgeon: Alise Hart MD;  [address];  Service: ENT;  Laterality: Right;  Ischemic start 1351  Ischemic stop 1502    FLAP PROCEDURE Left 11/9/2022    Procedure: CREATION, FREE FLAP, ALT;  Surgeon: Alise Hart MD;  [facility];  Service: ENT;  Laterality: Left;    GLOSSECTOMY Bilateral 11/9/2022    Procedure: TOTAL GLOSSECTOMY;  Surgeon: Jesse James MD;  Location: [address];  Service: ENT;  Laterality: Bilateral;    INSERTION OF TUNNELED CENTRAL VENOUS CATHETER (CVC) WITH SUBCUTANEOUS PORT N/A 6/9/2022    Procedure: [id number];  Surgeon: Jesus Viera MD;  Location: [facility] OR;  Service: General;  Laterality: N/A;    INSERTION OF TUNNELED CENTRAL VENOUS CATHETER (CVC) WITH SUBCUTANEOUS PORT Right 1/12/2023    Procedure: [id number];  Surgeon: Abdulaziz Le Jr., MD;  Location: [facility] OR;  Service: General;  Laterality: Right;    LARYNGECTOMY N/A 1/6/2022    Procedure: LARYNGECTOMY;  Surgeon: Jesse James MD;  [facility];  Service: ENT;  Laterality: N/A;    LARYNGOSCOPY N/A 8/4/2020    Procedure: Suspension microlaryngoscopy with biopsy, possible KTP laser treatment/excision;  Surgeon: Stew Noel MD;  Location: [facility] OR [address] [place] [address];  Service:  ENT;  Laterality: N/A;  Microscope, telescopes, tower, microinstruments, KTP laser, rep conf# [id number] IC 7/28.    LARYNGOSCOPY N/A 3/16/2021    Procedure: Suspension microlaryngoscopy with excision of lesion, possible CO2 laser;  Surgeon: Stew Noel MD;  Location: [facility] OR [address] [place] [address];  Service: ENT;  Laterality: N/A;  Microscope, telescopes, tower, microinstruments, CO2 laser, rep conf# [id number] IC 3/4.    LARYNGOSCOPY N/A 4/1/2021    Procedure: Suspension microlaryngoscopy with KTP laser excision of lesion;  Surgeon: Stew Noel MD;  Location: [facility] OR [address] [place] [address];  Service: ENT;  Laterality: N/A;  Microscope, telescopes, tower, microinstruments, 70 degree scope, vocal fold , KTP laser, rep conf# [id number] BC    LARYNGOSCOPY N/A 12/9/2021    Procedure: Suspension microlaryngoscopy with biopsy;  Surgeon: Stew Noel MD;  Location: [address];  Service: ENT;  Laterality: N/A;  Microscope, telescopes, tower, microinstruments    LARYNGOSCOPY N/A 1/6/2022    Procedure: LARYNGOSCOPY;  Surgeon: Jesse James MD;  Location: [address];  Service: ENT;  Laterality: N/A;    LARYNGOSCOPY N/A 4/27/2022    Procedure: LARYNGOSCOPY WITH BIOPSY;  Surgeon: Jesse James MD;  Location: [address];  Service: ENT;  Laterality: N/A;    MICROLARYNGOSCOPY N/A 3/17/2020    Procedure: MICROLARYNGOSCOPY;  Surgeon: Jung Xiao MD;  Location: [facility];  Service: ENT;  Laterality: N/A;  Laser Microlaryngoscopy  NEED TO SCHEDULE LASER from [facility] [id number]    PHARYNGECTOMY  11/9/2022    Procedure: TOTAL PHARYNGECTOMY;  Surgeon: Jesse James MD;  Location: [facility] OR [address] [place] [address];  Service: ENT;;    REIMPLANTATION OF PARATHYROID TISSUE N/A 1/6/2022    Procedure: REIMPLANTATION, PARATHYROID TISSUE;  Surgeon: Jesse James MD;  Location: [facility] OR [address] [place] [address];  Service: ENT;  Laterality: N/A;    SKIN SPLIT GRAFT Right 11/9/2022    Procedure: APPLICATION, GRAFT, SKIN,  SPLIT-THICKNESS;  Surgeon: Jesse James MD;  Location: [facility]ND [facility];  Service: ENT;  Laterality: Right;    THYROIDECTOMY  1/6/2022    Procedure: THYROIDECTOMY;  Surgeon: Jesse James MD;  [facility]: [facility];  Service: ENT;;    TRACHEOSTOMY N/A 12/27/2021    Procedure: CREATION, TRACHEOSTOMY;  Surgeon: Flex Espinosa MD;  Location: [facility] 2ND [facility];  Service: ENT;  Laterality: N/A;     Social History     Socioeconomic History    Marital status:      Spouse name: Janel Batres    Number of children: 2   Occupational History    Occupation: [employer]     Employer: [employer]   Tobacco Use    Smoking status: Never    Smokeless tobacco: Never   Substance and Sexual Activity    Alcohol use: Not Currently     Comment: occasional    Drug use: No    Sexual activity: Yes     Partners: Female   Social History Narrative    ** Merged History Encounter **         2 children from his 1st wife     Social Determinants of Health     Financial Resource Strain: Low Risk  (8/16/2023)    Overall Financial Resource Strain (CARDIA)     Difficulty of Paying Living Expenses: Not hard at all   Food Insecurity: No Food Insecurity (8/16/2023)    Hunger Vital Sign     Worried About Running Out of Food in the Last Year: Never true     Ran Out of Food in the Last Year: Never true   Transportation Needs: No Transportation Needs (8/16/2023)    PRAPARE - Transportation     Lack of Transportation (Medical): No     Lack of Transportation (Non-Medical): No   Physical Activity: Sufficiently Active (8/16/2023)    Exercise Vital Sign     Days of Exercise per Week: 7 days     Minutes of Exercise per Session: 60 min   Stress: No Stress Concern Present (8/16/2023)    [employer] [employer] - Occupational Stress Questionnaire     Feeling of Stress : Not at all   Social Connections: Socially Isolated (8/16/2023)    Social Connection and Isolation Panel [NHANES]     Frequency of Communication with Friends and  Family: Never     Frequency of Social Gatherings with Friends and Family: Never     Attends Bahai Services: Never     Active Member of Clubs or Organizations: No     Attends Club or Organization Meetings: Never     Marital Status:    Housing Stability: Low Risk  (8/16/2023)    Housing Stability Vital Sign     Unable to Pay for Housing in the Last Year: No     Number of Places Lived in the Last Year: 1     Unstable Housing in the Last Year: No     Family History   Problem Relation Age of Onset    Abnormal EKG Mother     Diabetes Father     Heart disease Father     Hypertension Father      Medication List with Changes/Refills   Current Medications    ACETAMINOPHEN (TYLENOL) 325 MG TABLET    Take 325 mg by mouth every 6 (six) hours as needed for Pain.    CALCITRIOL (ROCALTROL) 1 MCG/ML SOLUTION    Give 0.25 mLs (0.25 mcg total) by Per G Tube route once daily.    CALCIUM CARBONATE 500 MG/5 ML (1,250 MG/5 ML)    Take 20 mls four times daily with meals and nightly.    ESOMEPRAZOLE (NEXIUM) 40 MG CAPSULE    40 mg before breakfast.    FAMOTIDINE (PEPCID) 40 MG/5 ML (8 MG/ML) SUSPENSION    Give 2.5 mLs (20 mg total) by Per G Tube route 2 (two) times daily.    GABAPENTIN (NEURONTIN) 300 MG CAPSULE    Take 2 capsules (600 mg total) by mouth 3 (three) times daily.    IBUPROFEN (ADVIL,MOTRIN) 200 MG TABLET    Take 200 mg by mouth every 6 (six) hours as needed for Pain.    LACTOSE-REDUCED FOOD WITH FIBR (JEVITY 1.5 TRISHA) 0.06 GRAM-1.5 KCAL/ML LIQD    6 cartons per day via peg.  Flush 60ml before and after each bolus    LEVOTHYROXINE (SYNTHROID) 100 MCG TABLET    1 tablet (100 mcg total) by Per G Tube route before breakfast.    LOSARTAN (COZAAR) 100 MG TABLET    Take 0.5 tablets (50 mg total) by mouth once daily.    SODIUM CHLORIDE 1,000 MG TBSO ORAL TABLET    Take 1 tablet (1,000 mg total) by mouth 2 (two) times a day. Please crush the tablets for the PEG tube feeding or see if a capsule form is available.    TRAZODONE  (DESYREL) 50 MG TABLET    TAKE 1 TABLET BY MOUTH NIGHTLY AS NEEDED FOR INSOMNIA.    ZOLPIDEM (AMBIEN) 5 MG TAB    1 tablet (5 mg total) by Per G Tube route nightly as needed (difficult with sleep).     Review of patient's allergies indicates:   Allergen Reactions    Lovastatin Itching    Pollen extracts     Lovastatin Rash     Not confirmed but pt skeptical       QUALITY OF LIFE: 90%- Able to Carry on Normal Activity: Minor Symptoms of Disease    There were no vitals filed for this visit.  There is no height or weight on file to calculate BMI.    PHYSICAL EXAM:   GENERAL: alert; in no apparent distress.   HEAD: normocephalic, atraumatic.  EYES: pupils are equal, round, reactive to light and accommodation. Sclera anicteric. Conjunctiva not injected.   NOSE/THROAT: no nasal erythema or rhinorrhea. Oropharynx pink, without erythema, ulcerations or thrush. Aphonic; large volume, tenacious mucus  NECK: mild cervical motion rigidity; supple with no masses. Trach c/d/i  CHEST: Patient is speaking comfortably on room air with normal work of breathing without using accessory muscles of respiration.  CARDIOVASCULAR: regular rate and rhythm  ABDOMEN: soft, nontender, nondistended. Peg c/d/i  MUSCULOSKELETAL: no tenderness to palpation along the spine or scapulae. Normal range of motion.  NEUROLOGIC: cranial nerves II-XII intact bilaterally. Strength 5/5 in bilateral upper and lower extremities. No sensory deficits appreciated. Normal gait.  LYMPHATIC: no cervical, supraclavicular adenopathy appreciated. Mpderate fibrosis  EXTREMITIES: no clubbing, cyanosis, edema.  SKIN: no erythema, rashes, ulcerations noted.     ANCILLARY DATA:   9/21/23 CT N/C  IMPRESSION:  1. Extensive postsurgical changes throughout the neck as described, with no evidence of residual or recurrent malignancy.  2. Negative for metastatic disease throughout the neck or the chest.  3. 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  4. Coronary  artery calcifications.    5/30/23 PET  IMPRESSION:   -Postsurgical changes from total glossectomy, laryngectomy and pharyngectomy with bilateral neck dissections  -There is resolution of the previously noted fluid collections within the neck. There is mild FDG activity in the left side the neck adjacent to the neoesophagus as well as at the tracheostomy site to the right of the neoesophagus. Findings most likely are secondary to postsurgical and postinfection changes. There is no focal mass or adenopathy  -No evidence of distant metastasis    ASSESSMENT: Cyrus Batres Jr. is a male with stage [id number] R1 mod-poor diff p16(-) SCCA BOT  PLAN:     - Cont antihistamine, mucinex, fluids by PEG tube to assist with tenacious mucus; trial scopolamine, return to lymphedema therapy.  Will inquire about additional suggestions from ENT (parotid/duct surgery?).  Await botox response.  - ZAY by REBEL OLMOS, 9/23 CT's  - Studies: PET (Dr. Tamez)  - Follow up with Dr. James, Dr. Tamez, ENDO  - Return to clinic 6mos.    All questions answered and contact information provided. Patient understands free to call us anytime with any questions or concerns regarding radiation therapy.    I have personally seen and evaluated this patient with a moderate to high complexity diagnosis.      Greater than 45 minutes were dedicated to reviewing/interpreting pertinent laboratory/imaging/pathology as well as follow-up with concurrent consultants; reviewing and performing history and physical; counseling patient on continuing oncologic recommendations; documentation in the electronic medical record including ordering of additional tests and/or radiation treatment protocol; and coordination of care with physicians with referrals placed as appropriate.    PHYSICIAN: Matheus Portillo Jr, MD

## 2023-12-05 ENCOUNTER — TELEPHONE (OUTPATIENT)
Dept: SPEECH THERAPY | Facility: HOSPITAL | Age: 73
End: 2023-12-05
Payer: MEDICARE

## 2023-12-05 ENCOUNTER — PATIENT MESSAGE (OUTPATIENT)
Dept: SPEECH THERAPY | Facility: HOSPITAL | Age: 73
End: 2023-12-05
Payer: MEDICARE

## 2023-12-05 ENCOUNTER — PATIENT MESSAGE (OUTPATIENT)
Dept: OPHTHALMOLOGY | Facility: CLINIC | Age: 73
End: 2023-12-05
Payer: MEDICARE

## 2023-12-05 NOTE — TELEPHONE ENCOUNTER
Sw pt spouse regarding scheduling appt appropriate at main campus to assist with pt requests, she states she did not know he was having difficulty with anything and will reach out to him yo discuss I advised her to take a look at his MyChart msgs from [facility]. Could not make appt due to her being at work.

## 2023-12-06 RX ORDER — HEPARIN 100 UNIT/ML
500 SYRINGE INTRAVENOUS
Status: CANCELLED | OUTPATIENT
Start: 2023-12-06

## 2023-12-06 RX ORDER — SODIUM CHLORIDE 0.9 % (FLUSH) 0.9 %
10 SYRINGE (ML) INJECTION
Status: CANCELLED | OUTPATIENT
Start: 2023-12-06

## 2023-12-07 ENCOUNTER — INFUSION (OUTPATIENT)
Dept: INFUSION THERAPY | Facility: HOSPITAL | Age: 73
End: 2023-12-07
Attending: INTERNAL MEDICINE
Payer: MEDICARE

## 2023-12-07 VITALS
TEMPERATURE: 98 F | SYSTOLIC BLOOD PRESSURE: 128 MMHG | DIASTOLIC BLOOD PRESSURE: 66 MMHG | OXYGEN SATURATION: 100 % | HEART RATE: 72 BPM | RESPIRATION RATE: 18 BRPM

## 2023-12-07 DIAGNOSIS — C01 MALIGNANT NEOPLASM OF BASE OF TONGUE: ICD-10-CM

## 2023-12-07 DIAGNOSIS — E86.0 DEHYDRATION: ICD-10-CM

## 2023-12-07 DIAGNOSIS — C32.9 LARYNX CANCER: Primary | ICD-10-CM

## 2023-12-07 PROCEDURE — A4216 STERILE WATER/SALINE, 10 ML: HCPCS | Performed by: INTERNAL MEDICINE

## 2023-12-07 PROCEDURE — 25000003 PHARM REV CODE 250: Performed by: INTERNAL MEDICINE

## 2023-12-07 PROCEDURE — 96523 IRRIG DRUG DELIVERY DEVICE: CPT

## 2023-12-07 PROCEDURE — 63600175 PHARM REV CODE 636 W HCPCS: Performed by: INTERNAL MEDICINE

## 2023-12-07 RX ORDER — SODIUM CHLORIDE 0.9 % (FLUSH) 0.9 %
10 SYRINGE (ML) INJECTION
Status: CANCELLED | OUTPATIENT
Start: 2023-12-07

## 2023-12-07 RX ORDER — HEPARIN 100 UNIT/ML
500 SYRINGE INTRAVENOUS
Status: DISCONTINUED | OUTPATIENT
Start: 2023-12-07 | End: 2023-12-07 | Stop reason: HOSPADM

## 2023-12-07 RX ORDER — HEPARIN 100 UNIT/ML
500 SYRINGE INTRAVENOUS
Status: CANCELLED | OUTPATIENT
Start: 2023-12-07

## 2023-12-07 RX ORDER — SODIUM CHLORIDE 0.9 % (FLUSH) 0.9 %
10 SYRINGE (ML) INJECTION
Status: DISCONTINUED | OUTPATIENT
Start: 2023-12-07 | End: 2023-12-07 | Stop reason: HOSPADM

## 2023-12-07 RX ADMIN — HEPARIN 500 UNITS: 100 SYRINGE at 01:12

## 2023-12-07 RX ADMIN — SODIUM CHLORIDE, PRESERVATIVE FREE 10 ML: 5 INJECTION INTRAVENOUS at 01:12

## 2023-12-14 DIAGNOSIS — C32.9 LARYNX CANCER: Primary | ICD-10-CM

## 2023-12-18 ENCOUNTER — LAB VISIT (OUTPATIENT)
Dept: LAB | Facility: HOSPITAL | Age: 73
End: 2023-12-18
Attending: INTERNAL MEDICINE
Payer: MEDICARE

## 2023-12-18 ENCOUNTER — PATIENT MESSAGE (OUTPATIENT)
Dept: RADIATION ONCOLOGY | Facility: CLINIC | Age: 73
End: 2023-12-18

## 2023-12-18 DIAGNOSIS — D50.9 IRON DEFICIENCY ANEMIA, UNSPECIFIED IRON DEFICIENCY ANEMIA TYPE: ICD-10-CM

## 2023-12-18 LAB
ALBUMIN SERPL BCP-MCNC: 4.4 G/DL (ref 3.5–5.2)
ALP SERPL-CCNC: 155 U/L (ref 55–135)
ALT SERPL W/O P-5'-P-CCNC: 39 U/L (ref 10–44)
ANION GAP SERPL CALC-SCNC: 9 MMOL/L (ref 8–16)
AST SERPL-CCNC: 34 U/L (ref 10–40)
BASOPHILS # BLD AUTO: 0.02 K/UL (ref 0–0.2)
BASOPHILS NFR BLD: 0.4 % (ref 0–1.9)
BILIRUB SERPL-MCNC: 1.1 MG/DL (ref 0.1–1)
BUN SERPL-MCNC: 28 MG/DL (ref 8–23)
CALCIUM SERPL-MCNC: 8.4 MG/DL (ref 8.7–10.5)
CHLORIDE SERPL-SCNC: 101 MMOL/L (ref 95–110)
CO2 SERPL-SCNC: 27 MMOL/L (ref 23–29)
CREAT SERPL-MCNC: 1.1 MG/DL (ref 0.5–1.4)
DIFFERENTIAL METHOD: ABNORMAL
EOSINOPHIL # BLD AUTO: 0.3 K/UL (ref 0–0.5)
EOSINOPHIL NFR BLD: 4.8 % (ref 0–8)
ERYTHROCYTE [DISTWIDTH] IN BLOOD BY AUTOMATED COUNT: 12.6 % (ref 11.5–14.5)
EST. GFR  (NO RACE VARIABLE): >60 ML/MIN/1.73 M^2
FERRITIN SERPL-MCNC: 341.5 NG/ML (ref 20–300)
GLUCOSE SERPL-MCNC: 202 MG/DL (ref 70–110)
HCT VFR BLD AUTO: 37.5 % (ref 40–54)
HGB BLD-MCNC: 13 G/DL (ref 14–18)
IMM GRANULOCYTES # BLD AUTO: 0.03 K/UL (ref 0–0.04)
IMM GRANULOCYTES NFR BLD AUTO: 0.6 % (ref 0–0.5)
IRON SERPL-MCNC: 118 UG/DL (ref 45–160)
LYMPHOCYTES # BLD AUTO: 0.6 K/UL (ref 1–4.8)
LYMPHOCYTES NFR BLD: 11 % (ref 18–48)
MCH RBC QN AUTO: 33.2 PG (ref 27–31)
MCHC RBC AUTO-ENTMCNC: 34.7 G/DL (ref 32–36)
MCV RBC AUTO: 96 FL (ref 82–98)
MONOCYTES # BLD AUTO: 0.3 K/UL (ref 0.3–1)
MONOCYTES NFR BLD: 6 % (ref 4–15)
NEUTROPHILS # BLD AUTO: 4 K/UL (ref 1.8–7.7)
NEUTROPHILS NFR BLD: 77.2 % (ref 38–73)
NRBC BLD-RTO: 0 /100 WBC
PLATELET # BLD AUTO: 116 K/UL (ref 150–450)
PMV BLD AUTO: 11.8 FL (ref 9.2–12.9)
POTASSIUM SERPL-SCNC: 3.8 MMOL/L (ref 3.5–5.1)
PROT SERPL-MCNC: 7.1 G/DL (ref 6–8.4)
RBC # BLD AUTO: 3.92 M/UL (ref 4.6–6.2)
SATURATED IRON: 41 % (ref 20–50)
SODIUM SERPL-SCNC: 137 MMOL/L (ref 136–145)
TOTAL IRON BINDING CAPACITY: 291 UG/DL (ref 250–450)
TRANSFERRIN SERPL-MCNC: 208 MG/DL (ref 200–375)
WBC # BLD AUTO: 5.19 K/UL (ref 3.9–12.7)

## 2023-12-18 PROCEDURE — 82728 ASSAY OF FERRITIN: CPT | Performed by: INTERNAL MEDICINE

## 2023-12-18 PROCEDURE — 84466 ASSAY OF TRANSFERRIN: CPT | Performed by: INTERNAL MEDICINE

## 2023-12-18 PROCEDURE — 36415 COLL VENOUS BLD VENIPUNCTURE: CPT | Performed by: INTERNAL MEDICINE

## 2023-12-18 PROCEDURE — 83540 ASSAY OF IRON: CPT | Performed by: INTERNAL MEDICINE

## 2023-12-18 PROCEDURE — 85025 COMPLETE CBC W/AUTO DIFF WBC: CPT | Performed by: INTERNAL MEDICINE

## 2023-12-18 PROCEDURE — 80053 COMPREHEN METABOLIC PANEL: CPT | Performed by: INTERNAL MEDICINE

## 2023-12-21 ENCOUNTER — CLINICAL SUPPORT (OUTPATIENT)
Dept: SPEECH THERAPY | Facility: HOSPITAL | Age: 73
End: 2023-12-21
Payer: MEDICARE

## 2023-12-21 ENCOUNTER — PATIENT MESSAGE (OUTPATIENT)
Dept: SPEECH THERAPY | Facility: HOSPITAL | Age: 73
End: 2023-12-21

## 2023-12-21 DIAGNOSIS — Z90.02 S/P LARYNGECTOMY: Primary | ICD-10-CM

## 2023-12-21 DIAGNOSIS — R05.1 ACUTE COUGH: Primary | ICD-10-CM

## 2023-12-21 PROCEDURE — 92507 TX SP LANG VOICE COMM INDIV: CPT

## 2023-12-21 NOTE — PROGRESS NOTES
"DIAGNOSIS:  Alaryngeal voice (R49.0), s/p laryngectomy (Z90.02), History laryngeal cancer (Z85.21), History head and neck radiation (Z92.3) and History chemotherapy (Z92.21)  REFERRING DOCTOR:  Jesse James M.D., Head and Neck Surgical Oncologist     REASON FOR VISIT:   12/21/23: Mr Cyrus Batres presents for re-evaluation of communication, airway/stoma care, pulmonary care and swallow with known hx of 1/6/22 total laryngectomy, further compromised by Recurrent squamous cell carcinoma with 11/9/22 TOTAL PHARYNGECTOMY, TOTAL GLOSSECTOMY (Bilateral)  CREATION, FREE FLAP, ALT Left anterolateral thigh free flap for coverage of pharyngeal defect as well as glossectomy defect. Completed adjuvant chemoradiation therapy with platinum sensitization to 66 Gy ending March 9, 2023.  Treatment very well tolerated. He presents today seeking help with HMEs and stoma care. He would like to be rid of strap holding LaryTube. He is PEG dependent, occasionally sips water. Is mostly suctioning mouth throat day due to thick secretions. Has had botox and uses scopolamine patch to control excess saliva. He describes trismus setting in shortly after glossectomy. He does not perform stretches for jaw with any frequency. He is content with tube feeds, he is maintaining weight. Communicates with his phone with text to talk brittany or mouthing words.       2/17/22: Mr Btares returns reporting resolution of blood in stoma. He is on puree diet without any reported swallow difficulty. He has been using electrolarynx with family and friends, his wife understands him fairly well. Overall, he feels he is improving including gaining weight.      1/28/22: Nicolás Batres returns today reporting blood in stoma, filling larytube at times. This occurred on 1 day, he was concerned he became too dry following taking Benadryl. He continues to be NPO. He has been practicing with electrolarynx, wife states she understands "maybe half of what he says." They are " "both somewhat frustrated with communication with EL. He feels that neck is tight, thinks it is related to staples. He reports continued weight loss or at least no weight gain. He is hungry, despite feeding tube use of 5 cans per day. He is meeting with dietician today.      1/20/22: Cyrus Batres Jr. returned to clinic today for electrolarynx train, obtaining supplies, stoma care.  He was un/accompanied by his wife. He states he has gained a few lbs since hospital discharge. He is currently NPO, relying on feeding tube for nutrition. He describes gurgling sound after tube feeds, can taste the formula. He is using 4-5 cans a day. "Doesn't feel full or hungry." 1 carton at a time with water, upright endorses reflux even if he stays upright for an hour. "Food water and medicines go down initially and then come back up" kind of regurgitate into the tube and it won't clear. He and wife are managing stoma well, he is wearing Larytube and HMEs.     Oncology History   Larynx cancer   8/4/2020 Initial Diagnosis    Larynx neoplasm malignant     8/6/2020 Tumor Conference    His case was discussed at the Multidisciplinary Head and Neck Team Planning Meeting.    Representatives from Medical Oncology, Radiation Oncology, Head and Neck Surgical Oncology, Psychosocial Oncology, and Speech and Language Pathology discussed the case with the following recommendations:    1) definitive radiation              8/6/2020 Cancer Staged    Staging form: Larynx - Glottis, AJCC 8th Edition  - Clinical stage from 8/6/2020: Stage II (cT2, cN0, cM0)     8/6/2020 Cancer Staged    Cancer Staging  Larynx neoplasm malignant  Staging form: Larynx - Glottis, AJCC 8th Edition  - Clinical stage from 8/6/2020: Stage II (cT2, cN0, cM0) - Signed by Fariha Muñoz NP on 8/6/2020 12/16/2021 Tumor Conference    His case was discussed at the Multidisciplinary Head and Neck Team Planning Meeting.    Representatives from Medical Oncology, Radiation " Oncology, Head and Neck Surgical Oncology, Psychosocial Oncology, and Speech and Language Pathology discussed the case with the following recommendations:    1) TL  2) oncology referral  3) psychology referral            12/27/2021 Surgery    1. DL And tracheostomy  2. PEG     1/6/2022 Surgery    1. Diagnostic direct laryngoscopy  2. Bilateral modified neck dissection of levels 2 through 4  3. Total laryngectomy  4. Total thyroidectomy with bilateral paratracheal/upper mediastinal neck dissection  5. Autotransplantation of left inferior parathyroid into left sternocleidomastoid muscle   6. Left anterolateral thigh free flap for closure of total laryngectomy neck skin defect  7. Advancement flap for closure of left thigh donor site advanced area was 25 x 10 cm     1/20/2022 Cancer Staged    Staging form: Larynx - Glottis, AJCC 8th Edition  - Pathologic stage from 1/20/2022: Stage LOU (pT4a, pN0, cM0)     5/30/2022 Cancer Staged    Staging form: Pharynx - P16 Negative Oropharynx, AJCC 8th Edition  - Clinical: Stage II (rcT2, cN0, cM0)     1/23/2023 -  Chemotherapy    Treatment Summary   Plan Name: OP HEAD NECK CISPLATIN WEEKLY + RADIOTHERAPY  Treatment Goal: Curative  Status: Active  Start Date: 1/23/2023  End Date: 3/6/2023  Provider: Venu Tamez MD  Chemotherapy: CISplatin (Platinol) 40 mg/m2 = 66 mg in sodium chloride 0.9% 631 mL chemo infusion, 40 mg/m2 = 66 mg, Intravenous, Clinic[facility] 1 time, 7 of 7 cycles  Administration: 66 mg (1/23/2023), 66 mg (2/13/2023), 66 mg (2/6/2023), 66 mg (2/20/2023), 66 mg (2/27/2023), 66 mg (3/6/2023)     Malignant neoplasm of base of tongue   5/20/2022 Cancer Staged    Staging form: Pharynx - P16 Negative Oropharynx, AJCC 8th Edition  - Clinical stage from 5/20/2022: Stage II (cT2, cN0, cM0, p16-)     6/22/2022 Initial Diagnosis    Primary cancer of base of tongue     7/11/2022 - 8/26/2022 Chemotherapy    Treatment Summary   Plan Name: OP HEAD NECK PEMBROLIZUMAB CARBOPLATIN  FLUOROURACIL Q3W FOLLOWED BY MAINTENANCE PEMBROLIZUMAB 400 MG Q6W  Treatment Goal: Palliative  Status: Inactive  Start Date: 7/11/2022  End Date: 8/26/2022  Provider: Aurash Khoobehi, MD  Chemotherapy: CARBOplatin (PARAPLATIN) 440 mg in sodium chloride 0.9% 544 mL chemo infusion, 440 mg (100 % of original dose 438.5 mg), Intravenous, Clinic/[facility] 1 time, 3 of 6 cycles  Dose modification:   (original dose 438.5 mg, Cycle 1)  Administration: 440 mg (7/11/2022), 435 mg (8/1/2022), 510 mg (8/22/2022)     1/23/2023 -  Chemotherapy    Treatment Summary   Plan Name: OP HEAD NECK CISPLATIN WEEKLY + RADIOTHERAPY  Treatment Goal: Curative  Status: Active  Start Date: 1/23/2023  End Date: 3/6/2023  Provider: Venu Tamez MD  Chemotherapy: CISplatin (Platinol) 40 mg/m2 = 66 mg in sodium chloride 0.9% 631 mL chemo infusion, 40 mg/m2 = 66 mg, Intravenous, Clinic/[facility] 1 time, 7 of 7 cycles  Administration: 66 mg (1/23/2023), 66 mg (2/13/2023), 66 mg (2/6/2023), 66 mg (2/20/2023), 66 mg (2/27/2023), 66 mg (3/6/2023)       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    PEG (percutaneous endoscopic gastrostomy) adjustment/replacement/removal 11/22/2022    Postoperative 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    Tongue cancer     Tracheostomy dependence     Type 2 diabetes mellitus, without long-term current use of insulin 11/22/2022     Current Outpatient Medications on File Prior to Visit   Medication Sig Dispense Refill    acetaminophen (TYLENOL) 325 MG tablet Take 325 mg by mouth every 6 (six) hours as needed for  Pain.      calcitrioL (ROCALTROL) 1 mcg/mL solution Give 0.25 mLs (0.25 mcg total) by Per G Tube route once daily. 15 mL 3    calcium carbonate 500 mg/5 mL (1,250 mg/5 mL) Take 20 mls four times daily with meals and nightly. 2300 mL 12    esomeprazole (NEXIUM) 40 MG capsule 40 mg before breakfast.      famotidine (PEPCID) 40 mg/5 mL (8 mg/mL) suspension Give 2.5 mLs (20 mg total) by Per G Tube route 2 (two) times daily. 50 mL 11    gabapentin (NEURONTIN) 300 MG capsule Take 2 capsules (600 mg total) by mouth 3 (three) times daily. 180 capsule 0    ibuprofen (ADVIL,MOTRIN) 200 MG tablet Take 200 mg by mouth every 6 (six) hours as needed for Pain.      lactose-reduced food with fibr (JEVITY 1.5 TRISHA) 0.06 gram-1.5 kcal/mL Liqd 6 cartons per day via peg.  Flush 60ml before and after each bolus 180 each 11    levothyroxine (SYNTHROID) 100 MCG tablet 1 tablet (100 mcg total) by Per G Tube route before breakfast. 30 tablet 11    losartan (COZAAR) 100 MG tablet Take 0.5 tablets (50 mg total) by mouth once daily. (Patient not taking: Reported on 11/7/2023) 45 tablet 3    scopolamine (TRANSDERM-SCOP) 1.3-1.5 mg (1 mg over 3 days) Place 1 patch onto the skin every 72 hours. 24 patch 3    sodium chloride 1,000 mg TbSO oral tablet Take 1 tablet (1,000 mg total) by mouth 2 (two) times a day. Please crush the tablets for the PEG tube feeding or see if a capsule form is available. (Patient not taking: Reported on 11/7/2023) 100 tablet 2    traZODone (DESYREL) 50 MG tablet TAKE 1 TABLET BY MOUTH NIGHTLY AS NEEDED FOR INSOMNIA. 90 tablet 1    zolpidem (AMBIEN) 5 MG Tab 1 tablet (5 mg total) by Per G Tube route nightly as needed (difficult with sleep). 30 tablet 0    [DISCONTINUED] aspirin (ECOTRIN) 81 MG EC tablet Take 81 mg by mouth Daily.       No current facility-administered medications on file prior to visit.       TREATMENT HISTORY:  Hx of radiation tx, 1/6/22 salvage laryngectomy.  11/9/22 total glossectomy and pharyngectomy  with adjuvant chemoradiation therapy completed 3/9/23       INTERVENTION:  Stoma: Stoma is patent with granuloma at 11 o'clock that is small and pedunculated. Minimal mucus was suctioned. LaryTube is size 9 and pistons in and out of stoma, in part due to having a very deep stoma. He reports using stoma adhesive and HME, today opted to try LaryButton. He was well fit with size 16/8, which did not piston, did not cause discomfort.     Swallow and trismus: pt has minimal oral opening, can slide tip of one finger between teeth. He was trained in some passive stretch exercises to employ 7x per day, 7 reps, holding 7 seconds. He was able to demonstrate and understood well.      Pulmonary rehab:  Wearing Larytube or adhesive with HME, today leaves with size 16/8 LaryButton for trial     AL training:  Did not have EL with him today      Swallowing:  Uses PEG for sole nutrition     Supplies: Leaves with 16/8 LaryButton       GERD management:  Encouraged remaining upright for at least 2 hours following tube feeds.         At the end of the session, Cyrus Batres  was wearing  1. Stoma adhesive and HMEs        IMPRESSIONS:  1.  Mr Batres is s/p 1/6/22 salvage laryngectomy  2.  History of  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    PEG (percutaneous endoscopic gastrostomy) adjustment/replacement/removal 11/22/2022    Postoperative 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    Tongue cancer     Tracheostomy dependence     Type 2 diabetes mellitus, without long-term current use of insulin 11/22/2022     Past  Surgical History:   Procedure Laterality Date    CATARACT EXTRACTION W/  INTRAOCULAR LENS IMPLANT Right 8/16/2023    Procedure: CEIOL OD  NPO-peg;  Surgeon: Stoney Munoz MD;  Location: [facility] OR;  Service: Ophthalmology;  Laterality: Right;    CATARACT EXTRACTION W/  INTRAOCULAR LENS IMPLANT Left 10/18/2023    Procedure: CEIOL OS npo peg;  Surgeon: Stoney Munoz MD;  Location: [facility] OR;  Service: Ophthalmology;  Laterality: Left;    DIRECT LARYNGOBRONCHOSCOPY N/A 12/27/2021    Procedure: LARYNGOSCOPY, DIRECT, WITH BRONCHOSCOPY;  Surgeon: Flex Espinosa MD;  [facility];  Service: ENT;  Laterality: N/A;    DIRECT LARYNGOSCOPY  11/9/2022    Procedure: LARYNGOSCOPY, DIRECT;  Surgeon: Jesse James MD;  [facility];  Service: ENT;;    DISSECTION OF NECK Bilateral 1/6/2022    Procedure: DISSECTION, NECK;  Surgeon: Jesse James MD;  [facility];  Service: ENT;  Laterality: Bilateral;    DISSECTION OF NECK Bilateral 11/9/2022    Procedure: DISSECTION, NECK;  Surgeon: Jesse James MD;  [facility];  Service: ENT;  Laterality: Bilateral;    FLAP PROCEDURE Right 1/6/2022    Procedure: CREATION, FREE FLAP;  Surgeon: Alise Hart MD;  [facility];  Service: ENT;  Laterality: Right;  Ischemic start 1351  Ischemic stop 1502    FLAP PROCEDURE Left 11/9/2022    Procedure: CREATION, FREE FLAP, ALT;  Surgeon: Alise Hart MD;  [facility];  Service: ENT;  Laterality: Left;    GLOSSECTOMY Bilateral 11/9/2022    Procedure: TOTAL GLOSSECTOMY;  Surgeon: Jesse James MD;  [facility];  Service: ENT;  Laterality: Bilateral;    INSERTION OF TUNNELED CENTRAL VENOUS CATHETER (CVC) WITH SUBCUTANEOUS PORT N/A 6/9/2022    Procedure: [id number];  Surgeon: Jesus Viera MD;  Location: [facility];  Service: General;  Laterality: N/A;    INSERTION OF TUNNELED CENTRAL VENOUS CATHETER (CVC) WITH  SUBCUTANEOUS PORT Right 1/12/2023    Procedure: [id number];  Surgeon: Abdulaziz Le Jr., MD;  Location: [facility];  Service: General;  Laterality: Right;    LARYNGECTOMY N/A 1/6/2022    Procedure: LARYNGECTOMY;  Surgeon: Jesse James MD;  Location: [facility] OR 2ND FLR;  Service: ENT;  Laterality: N/A;    LARYNGOSCOPY N/A 8/4/2020    Procedure: Suspension microlaryngoscopy with biopsy, possible KTP laser treatment/excision;  Surgeon: Stew Noel MD;  Location: [facility] OR [facility] FLR;  Service: ENT;  Laterality: N/A;  Microscope, telescopes, tower, microinstruments, KTP laser, rep conf# [id number] IC 7/28.    LARYNGOSCOPY N/A 3/16/2021    Procedure: Suspension microlaryngoscopy with excision of lesion, possible CO2 laser;  Surgeon: Stew Noel MD;  [facility];  Service: ENT;  Laterality: N/A;  Microscope, telescopes, tower, microinstruments, CO2 laser, rep conf# [id number] IC 3/4.    LARYNGOSCOPY N/A 4/1/2021    Procedure: Suspension microlaryngoscopy with KTP laser excision of lesion;  Surgeon: Stew Noel MD;  [facility];  Service: ENT;  Laterality: N/A;  Microscope, telescopes, tower, microinstruments, 70 degree scope, vocal fold , KTP laser, rep conf# [id number] BC    LARYNGOSCOPY N/A 12/9/2021    Procedure: Suspension microlaryngoscopy with biopsy;  Surgeon: Stew Noel MD;  [facility];  Service: ENT;  Laterality: N/A;  Microscope, telescopes, tower, microinstruments    LARYNGOSCOPY N/A 1/6/2022    Procedure: LARYNGOSCOPY;  Surgeon: Jesse James MD;  [facility];  Service: ENT;  Laterality: N/A;    LARYNGOSCOPY N/A 4/27/2022    Procedure: LARYNGOSCOPY WITH BIOPSY;  Surgeon: Jesse James MD;  Location: [facility] OR 2ND FLR;  Service: ENT;  Laterality: N/A;    MICROLARYNGOSCOPY N/A 3/17/2020    Procedure: MICROLARYNGOSCOPY;  Surgeon: Jung Xiao MD;  Location: [facility];  Service: ENT;  Laterality:  N/A;  Laser Microlaryngoscopy  NEED TO SCHEDULE LASER from [facility] [id number]    PHARYNGECTOMY  11/9/2022    Procedure: TOTAL PHARYNGECTOMY;  Surgeon: Jesse James MD;  Location: [facility] OR 2ND FLR;  Service: ENT;;    REIMPLANTATION OF PARATHYROID TISSUE N/A 1/6/2022    Procedure: REIMPLANTATION, PARATHYROID TISSUE;  Surgeon: Jesse James MD;  Location: [facility] OR 2ND FLR;  Service: ENT;  Laterality: N/A;    SKIN SPLIT GRAFT Right 11/9/2022    Procedure: APPLICATION, GRAFT, SKIN, SPLIT-THICKNESS;  Surgeon: Jesse James MD;  Location: [facility] OR 2ND FLR;  Service: ENT;  Laterality: Right;    THYROIDECTOMY  1/6/2022    Procedure: THYROIDECTOMY;  Surgeon: Jesse James MD;  Location: [facility] OR 2ND FLR;  Service: ENT;;    TRACHEOSTOMY N/A 12/27/2021    Procedure: CREATION, TRACHEOSTOMY;  Surgeon: Flex Espinosa MD;  Location: [facility] OR 2ND FLR;  Service: ENT;  Laterality: N/A;             RECOMMENDATIONS/PLAN OF CARE:    1.  Continued use of electrolarynx or text to talk brittany.  2.  Daily cleaning of stoma and Larytube or LaryButton, at least twice daily - or more, depending on mucus production.  3.  Continue use of HMEs and LaryTube, LaryButton or stoma adhesive 24/7 daily. Continue use of saline mist in stoma several times per day.  4. Continued ST for trismus and swallow as needed  5. Follow up with  as directed.    Pt is not interested in swallow therapy at present, is content with use of PEG. Suspect he may have upper esophageal stricture, as he cannot clear saliva nor swallows of liquids. He also no longer has reflux when bending over. Trismus is also severe.      Long-term goals:  Cyrus Batres Jr.  will   1.  Be independent in communication device use.  2.  Be independent in care of stoma.        Short-term objectives:  Cyrus Batres Jr. will  1.  Twice daily cleaning of stoma.  2.  Continue use of HMEs and LaryTube or adhesives 24/7 daily.

## 2023-12-22 RX ORDER — CODEINE PHOSPHATE AND GUAIFENESIN 10; 100 MG/5ML; MG/5ML
5 SOLUTION ORAL 3 TIMES DAILY PRN
Qty: 473 ML | Refills: 0 | Status: SHIPPED | OUTPATIENT
Start: 2023-12-22 | End: 2024-01-23

## 2023-12-26 ENCOUNTER — TELEPHONE (OUTPATIENT)
Dept: ENDOCRINOLOGY | Facility: CLINIC | Age: 73
End: 2023-12-26
Payer: MEDICARE

## 2023-12-26 ENCOUNTER — CLINICAL SUPPORT (OUTPATIENT)
Dept: REHABILITATION | Facility: HOSPITAL | Age: 73
End: 2023-12-26
Payer: MEDICARE

## 2023-12-26 DIAGNOSIS — C32.9 LARYNX CANCER: Primary | ICD-10-CM

## 2023-12-26 DIAGNOSIS — I89.0 LYMPHEDEMA DUE TO RADIATION: ICD-10-CM

## 2023-12-26 PROCEDURE — 97165 OT EVAL LOW COMPLEX 30 MIN: CPT | Mod: PO

## 2023-12-26 PROCEDURE — 97110 THERAPEUTIC EXERCISES: CPT | Mod: PO

## 2023-12-26 NOTE — PLAN OF CARE
"[facility]  Occupational Therapy Initial Evaluation     Name: Cyrus Batres Jr.  Clinic Number: [id number]    Therapy Diagnosis:   Encounter Diagnosis   Name Primary?    Larynx cancer      Physician: Clay Lucero III, *    Physician Orders: Evaluation and treatment  Medical Diagnosis:     C32.9 (ICD-10-CM) - Larynx cancer     Evaluation Date: 12/26/2023  Insurance Authorization period Expiration: 12/26/2023-12/31/2024, 01/01/2024-12/31/2024  Plan of Care Certification Period: 12/26/2023-03/26/2024  FOTO 1:  FOTO 2:  FOTO 3:  Visit # / Visits Authortized: 1 / 40  Time In:2:00  Time Out: 3:00  Total Billable Time: 60 minutes- Evaluation, Therapeutic Exercise    Precautions: Standard and cancer, Patient is Nonverbal.    Subjective     Cyrus rates pain at a 0/10 where 0 = no pain and 10 = maximal pain. Best pain gets 0/10. Worst pain gets 4/10.    Patient's goals are as follows: To reduce the swelling below the chin.    History of Present Illness:  diagnosed with malignant neoplasm, larynx.  Nicolás reports: increased tension to left and right Sternocleidomastoid, across center of his throat.  "Pelicanism" per patient.  He completed one month of radiation externally to the base of tongue, ending in March 2023.     Onset: He began to have swelling immediately following surgery to the neck.  Surgery:        12/27/2021 Surgery     1. DL And tracheostomy  2. PEG      1/6/2022 Surgery     1. Diagnostic direct laryngoscopy  2. Bilateral modified neck dissection of levels 2 through 4  3. Total laryngectomy  4. Total thyroidectomy with bilateral paratracheal/upper mediastinal neck dissection  5. Autotransplantation of left inferior parathyroid into left sternocleidomastoid muscle   6. Left anterolateral thigh free flap for closure of total laryngectomy neck skin defect  7. Advancement flap for closure of left thigh donor site advanced area was 25 x 10 cm      Radiation:  x one month to base of " tongue  Chemotherapy:   Plan Name: OP HEAD NECK CISPLATIN WEEKLY + RADIOTHERAPY  Treatment Goal: Curative  Status: Active  Start Date: 1/23/2023  End Date: 3/6/2023  Provider: Venu Tamez MD  Chemotherapy: CISplatin (Platinol) 40 mg/m2 = 66 mg in sodium chloride 0.9% 631 mL chemo infusion, 40 mg/m2 = 66 mg, Intravenous, Clinic/HOD 1 time, 7 of 7 cycles  Administration: 66 mg (1/23/2023), 66 mg (2/13/2023), 66 mg (2/6/2023), 66 mg (2/20/2023), 66 mg (2/27/2023), 66 mg (3/6/2023)    Previous Lymphedema Treatment: He was seen in this clinic from May 2023-to July 2023.      Current Level of Function: Independent with self care  Prior Level of Function: Independent with self care and home management  Occupation/Duties: Retired,  and lives with his spouse.    Cyrus Batres Jr.  has a past medical history of Abnormal CT scan, neck, Allergy, Atrial fibrillation, Chronic anticoagulation, Diabetes mellitus, type 2, GRIFFIN (dyspnea on exertion), Encounter for blood transfusion, GERD (gastroesophageal reflux disease), Gout, unspecified, Hypertension, Hypothyroidism, Iron deficiency anemia, Iron deficiency anemia, Larynx neoplasm malignant, PEG (percutaneous endoscopic gastrostomy) adjustment/replacement/removal, Postoperative hypothyroidism, Tongue cancer, Tracheostomy dependence, and Type 2 diabetes mellitus, without long-term current use of insulin.    Cyrus Batres Jr.  has a past surgical history that includes Microlaryngoscopy (N/A, 3/17/2020); Laryngoscopy (N/A, 8/4/2020); Laryngoscopy (N/A, 3/16/2021); Laryngoscopy (N/A, 4/1/2021); Laryngoscopy (N/A, 12/9/2021); Tracheostomy (N/A, 12/27/2021); Direct laryngobronchoscopy (N/A, 12/27/2021); Laryngectomy (N/A, 1/6/2022); Dissection of neck (Bilateral, 1/6/2022); Thyroidectomy (1/6/2022); Laryngoscopy (N/A, 1/6/2022); Reimplantation of parathyroid tissue (N/A, 1/6/2022); Flap procedure (Right, 1/6/2022); Laryngoscopy (N/A, 4/27/2022); Insertion of tunneled  central venous catheter (CVC) with subcutaneous port (N/A, 6/9/2022); Pharyngectomy (11/9/2022); Glossectomy (Bilateral, 11/9/2022); Direct laryngoscopy (11/9/2022); Dissection of neck (Bilateral, 11/9/2022); Skin split graft (Right, 11/9/2022); Flap procedure (Left, 11/9/2022); Insertion of tunneled central venous catheter (CVC) with subcutaneous port (Right, 1/12/2023); Cataract extraction w/  intraocular lens implant (Right, 8/16/2023); and Cataract extraction w/  intraocular lens implant (Left, 10/18/2023).    Cyrus has a current medication list which includes the following prescription(s): acetaminophen, calcitriol, calcium carbonate, esomeprazole, famotidine, gabapentin, guaifenesin-codeine 100-10 mg/5 ml, ibuprofen, jevity 1.5 supa, levothyroxine, losartan, scopolamine, sodium chloride, trazodone, zolpidem, and [DISCONTINUED] aspirin.    Review of patient's allergies indicates:   Allergen Reactions    Lovastatin Itching    Pollen extracts     Lovastatin Rash     Not confirmed but pt skeptical          Objective     Amount of Swelling: Moderate with fibrosis noted  Location of Swelling: Face and neck with a surgical scar present preventing flow of lymph fluid causing a pocket of fluid below the chin.    Skin Integrity: Visible skin intact, Dry scar 15 cm long, and Dry skin present.  Palpation/Texture: firm and rubbery  Circulation: warm, well perfused  Posture: Fair, Protruded Head  Communication: tablet    Range of Motion:  Neck Left Right   Flexion 12    Extension 30    Neck Rotation 70 40   Lateral flexion 30 20       Girth Measurements (in centimeters)  LANDMARK Head/Neck Measurement Face  Right Face Left    Diagonal Head Circumference 65       Vertical Submental Circumference 62       Mandibular Angle to Mandibular Angle 23       Tragus Angle to Tragus Angle 31.8       Neck Superior 41.5       Neck Middle 33.5       Neck Inferior  33       Tragus to Mental Protuberance  16.5 16.3    Tragus to Mouth Angle   11.3 11.8    Mandibular Angle to Nasal Wing  11 11    Mandibular Angle to Internal Eye Corner  13.5 13    Mandibular Angle to External Eye Corner  10.2 9.5    Mental Protuberance to Internal Eye Corner  11.5 12    Mandibular Angle to Mental Protuberance  11.7 11    Totals        Total girth measurement-  460.1       Total Girth Reduction         Lymphencephalopathy: Moderate     Sensation: Impaired  Scar Length: 15 cm   Scar Mobility: not mobile along the length of the scar in any direction.    Treatment     Treatment Time In (separate from total time) : 2:45  Treatment Time Out (separate from total time : 3:00    Nicolás received Therapeutic exercises for 15 minutes as follows:    Issued Home Exercise Program and  patient  verbally understood the instructions as they were given and demonstrated proper form and technique during therapy. Patient was advise to perform these exercises free of pain, and to stop performing them if pain occurs.  Exercises provided were for posture improvement focusing on Pelvic tilts, scapula retraction and AROM exercises in all planes of motion to improve neck mobility.    Patient/Family Education: role of Occupational Therapy, goals for Occupational Therapy, scheduling/cancellations - pt verbalized understanding. Discussed insurance limitations with patient. Patient was educated in lymphedema etiology and management plans.  Patient was provided with written risk reductions and precautions for managing lymphedema.      Assessment     This patient presents with chronic swelling in the face and neck following surgery and radiation  resulting in: lymphedema of the face and neck, increased pain, increased stiffness in the neck and placing the pt at higher risk of infection. Following medical record review it is determined that patient  will benefit from occupational therapy services in order to maximize pain free and improved motion of the neck. The following goals were discussed with the  patient and patient is in agreement with them as to be addressed in the treatment plan. The patient's rehab potential is Good.     Anticipated barriers to occupational therapy: Nonverbal  Pt has no cultural, educational or language barriers to learning provided.    Profile and History Assessment of Occupational Performance Level of Clinical Decision Making Complexity Score   Occupational Profile:   Cyrus Batres Jr. is a 73 y.o. male who lives with their spouse and is currently retired. His/her main goal for therapy is to reduce the swelling below the chin.     Comorbidities:    has a past medical history of Abnormal CT scan, neck, Allergy, Atrial fibrillation, Chronic anticoagulation, Diabetes mellitus, type 2, GRIFFIN (dyspnea on exertion), Encounter for blood transfusion, GERD (gastroesophageal reflux disease), Gout, unspecified, Hypertension, Hypothyroidism, Iron deficiency anemia, Iron deficiency anemia, Larynx neoplasm malignant, PEG (percutaneous endoscopic gastrostomy) adjustment/replacement/removal, Postoperative hypothyroidism, Tongue cancer, Tracheostomy dependence, and Type 2 diabetes mellitus, without long-term current use of insulin.    Medical and Therapy History Review:   Brief               Performance Deficits    Physical:  Joint Mobility  Skin Integrity/Scar Formation  Edema  Postural Control    Cognitive:  No Deficits    Psychosocial:    No Deficits     Clinical Decision Making:  high    Assessment Process:  Problem-Focused Assessments    Modification/Need for Assistance:  Not Necessary    Intervention Selection:  Several Treatment Options       low  Based on PMHX, co morbidities , data from assessments and functional level of assistance required with task and clinical presentation directly impacting function.         Short Term Goals: (4 weeks)  Patient to be Independent and compliant with Home Exercise Program.  Decrease girth in the face and neck by 6 cm for improved neck mobility.  Patient will  demonstrate 100% knowledge of lymphedema precautions and signs of infection.   Patient will perform self-scar mobility techniques.  Patient will perform self lymph drainage techniques.  Patient will tolerate compression garment wearing schedule.    Long Term Goals: (12 weeks)  Decrease girth in face and neck by 10 cm for improved neck mobility.  Patient will show reduction in girth to mild or less with improved contour of face and neck.  Patient to malinda/doff compression garment with daily compliance.       Plan     Patient to be seen 1-2 x per week for 90 days for the medically necessary treatments to include: decongestive massage- Manual lymphatic drainage, kinesio tape, scar massage, education in lymphedema precautions, self lymph drainage massage, and assistance in obtaining appropriate compression garment. Therapeutic exercise, postural correction, and progression of Home Exercise Program.      Thania Plata OT, CLT

## 2023-12-27 ENCOUNTER — OFFICE VISIT (OUTPATIENT)
Dept: OPTOMETRY | Facility: CLINIC | Age: 73
End: 2023-12-27
Payer: MEDICARE

## 2023-12-27 DIAGNOSIS — Z01.00 EXAMINATION OF EYES AND VISION: Primary | ICD-10-CM

## 2023-12-27 DIAGNOSIS — Z96.1 PSEUDOPHAKIA: ICD-10-CM

## 2023-12-27 DIAGNOSIS — E11.9 DIABETES MELLITUS TYPE 2 WITHOUT RETINOPATHY: ICD-10-CM

## 2023-12-27 DIAGNOSIS — H04.123 DRY EYE SYNDROME, BILATERAL: ICD-10-CM

## 2023-12-27 DIAGNOSIS — H40.013 OPEN ANGLE WITH BORDERLINE FINDINGS OF BOTH EYES: ICD-10-CM

## 2023-12-27 DIAGNOSIS — H52.7 REFRACTIVE ERROR: ICD-10-CM

## 2023-12-27 PROCEDURE — 99999 PR PBB SHADOW E&M-EST. PATIENT-LVL III: ICD-10-PCS | Mod: PBBFAC,,, | Performed by: OPTOMETRIST

## 2023-12-27 PROCEDURE — 92015 PR REFRACTION: ICD-10-PCS | Mod: S$GLB,,, | Performed by: OPTOMETRIST

## 2023-12-27 PROCEDURE — 92015 DETERMINE REFRACTIVE STATE: CPT | Mod: S$GLB,,, | Performed by: OPTOMETRIST

## 2023-12-27 PROCEDURE — 99213 OFFICE O/P EST LOW 20 MIN: CPT | Mod: S$GLB,,, | Performed by: OPTOMETRIST

## 2023-12-27 PROCEDURE — 99213 PR OFFICE/OUTPT VISIT, EST, LEVL III, 20-29 MIN: ICD-10-PCS | Mod: S$GLB,,, | Performed by: OPTOMETRIST

## 2023-12-27 PROCEDURE — 99999 PR PBB SHADOW E&M-EST. PATIENT-LVL III: CPT | Mod: PBBFAC,,, | Performed by: OPTOMETRIST

## 2023-12-27 NOTE — PROGRESS NOTES
HPI    DLS: 11/16/2023- pt c/o trouble with new specs. Makes him dizzy when   looking straight ahead. States OD seems fine but OS is not, distance is   blurry.     OS. trouble reading. Thinks bifocal may be to high. Does not see double.     States they do not transition enough for him. Still to bright.     Hemoglobin A1C       Date                     Value               Ref Range             Status                05/17/2023               5.1                 4.0 - 5.6 %           Final                   11/22/2022               5.8                 4.5 - 6.2 %           Final                  11/09/2022               5.7 (H)             4.0 - 5.6 %           Final                Last edited by Zhen Pham, OD on 12/27/2023  4:42 PM.            Assessment /Plan     For exam results, see Encounter Report.    Examination of eyes and vision    Pseudophakia    Refractive error    Dry eye syndrome, bilateral    Open angle with borderline findings of both eyes    Diabetes mellitus type 2 without retinopathy      1. Examination of eyes and vision    2. Pseudophakia  S/p cataract extraction, no PCO    3. Refractive error  Dispensed updated spectacle Rx. Discussed various spectacle lens options. Discussed adaptation period to new specs.   Demonstrated new spec Rx vs current specs in phoropter with patient satisfaction  No diplopia in phoropter, notes vertical diplopia when prisms placed -- will keep specs w/o   Return if any trouble with new specs    4. Dry eye syndrome, bilateral  Discussed ocular affects of dry eyes. Recommend OTC artificial tears 2-4 times a day in both eyes. Discussed chronicity of ALPHONSE. RTC if symptoms not alleviated by continued use of artificial tears.     5. Open angle with borderline findings  Continue f/u with Dr. Munoz as directed    6. DM type 2 w/o retinopathy  Observe yearly

## 2023-12-28 ENCOUNTER — PATIENT MESSAGE (OUTPATIENT)
Dept: HEMATOLOGY/ONCOLOGY | Facility: CLINIC | Age: 73
End: 2023-12-28

## 2023-12-29 ENCOUNTER — CLINICAL SUPPORT (OUTPATIENT)
Dept: REHABILITATION | Facility: HOSPITAL | Age: 73
End: 2023-12-29
Payer: MEDICARE

## 2023-12-29 DIAGNOSIS — I89.0 LYMPHEDEMA DUE TO RADIATION: Primary | ICD-10-CM

## 2023-12-29 PROCEDURE — 97110 THERAPEUTIC EXERCISES: CPT | Mod: PO

## 2023-12-29 PROCEDURE — 97140 MANUAL THERAPY 1/> REGIONS: CPT | Mod: PO

## 2023-12-29 NOTE — PROGRESS NOTES
OccupationalTherapy Daily Treatment Note     Name: Cyrus Batres Jr.  Clinic Number: [id number]    Therapy Diagnosis:   Encounter Diagnosis   Name Primary?    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     Physician: Clay Lucero III, *    Visit Date: 12/29/2023     Physician Orders: Evaluation and treatment  Medical Diagnosis:     C32.9 (ICD-10-CM) - Larynx cancer      Evaluation Date: 12/26/2023  Insurance Authorization period Expiration: 12/26/2023-12/31/2024, 01/01/2024-12/31/2024  Plan of Care Certification Period: 12/26/2023-03/26/2024  FOTO 1:12/29/2023  FOTO 2:  FOTO 3:  Visit # / Visits Authortized: 2 / 40  Time In:2:30  Time Out: 3:30  Total Billable Time: 60 minutes- Evaluation, Therapeutic Exercise     Precautions: Standard and cancer, Patient is Nonverba    Subjective     Pt reports: That the are under his chin felt softer.  He was compliant with home exercise program.  Functional change: none    Pain: 0/10  Location: neck     Objective     Response to previous treatment: Evaluation only last session.  Treatment:   Nicolás received the following manual therapy techniques:- Manual Lymphatic Drainage and Soft tissue Mobilization were performed to the: face and neck for 60 minutes, including: Manual Lymph Drainage and short stretch compression bandaging     MANUAL LYMPHATIC DRAINAGE:  Drainage of the Right and Left upper quadrant from the axilla extending up to the neck, over the ear and to the side of the face to the jaw line.  While supine with head elevate,  Drainage provided from the front of the neck, B subclavian regions,  Across ant chest and top of shoulder,  drainage of the entire face with return to the axillas.       Nicolás received therapeutic exercises to develop cardiac output for 8 minutes including:   THERAPEUTIC EXERCISE- Patient performed Nustep therex for 8 min set at 3 resistance to increase cardiac output and increase lymph circulation.   THERAPEUTIC EXERCISES:  Continue Home Exercise  Program of Active Range Of Motion, stretching, and postural correction.     Home Exercises Provided and Patient Education Provided     Education provided:      PATIENT/FAMILY Education: garment wearing schedule,  Home Exercise Program,  Beginning of self massage,  Self or assisted bandaging , and Risk reduction    KINESIO TAPE: Nicolás was educated on kinesio tape wearing schedule and to remove if having ill effects.  Nicolás verbalized understanding of the above education.      Written Home Exercises Provided: Nicolás demonstrated good  understanding of the education provided.       Assessment     He brought the compression garment that he had previously.  He is using it with a chip bag to address the fibrotic fluid below the chin.  He tolerated Manual lymph drainage with out an issue.  He brought in the silicone gel pads that were recommended to be placed along the scar on the neck which is keeping fluid below his chin trapped.  The area below the chin was softer at the end of the session.    Nicolás Is progressing well towards his goals.   Patient  prognosis is Good.     Patient will continue to benefit from skilled outpatient occupational therapy to address the deficits listed in the problem list box on initial evaluation, provide pt/family education and to maximize pt's level of independence in the home and community environment.     Patient 's spiritual, cultural and educational needs considered and patient agreeable to plan of care and goals.     Anticipated barriers to occupational therapy: non verbal    Goals    Short Term Goals: (4 weeks)  In Progress  Patient to be Independent and compliant with Home Exercise Program.  In Progress  Decrease girth in the face and neck by 6 cm for improved neck mobility.  In Progress  Patient will demonstrate 100% knowledge of lymphedema precautions and signs of infection.   In Progress  Patient will perform self-scar mobility techniques.  In Progress  Patient will perform self  lymph drainage techniques.  In Progress  Patient will tolerate compression garment wearing schedule.     Long Term Goals: (12 weeks)  In Progress  Decrease girth in face and neck by 10 cm for improved neck mobility.  In Progress  Patient will show reduction in girth to mild or less with improved contour of face and neck.  In Progress  Patient to malinda/doff compression garment with daily compliance.       Plan      Patient to be seen 1-2 x per week for 90 days for the medically necessary treatments to include: decongestive massage- Manual lymphatic drainage, kinesio tape, scar massage, education in lymphedema precautions, self lymph drainage massage, and assistance in obtaining appropriate compression garment. Therapeutic exercise, postural correction, and progression of Home Exercise Program.       Thania Plata, OT, CLT

## 2024-01-03 ENCOUNTER — CLINICAL SUPPORT (OUTPATIENT)
Dept: HEMATOLOGY/ONCOLOGY | Facility: CLINIC | Age: 74
End: 2024-01-03
Payer: MEDICARE

## 2024-01-03 DIAGNOSIS — C32.9 LARYNX CANCER: Primary | ICD-10-CM

## 2024-01-03 NOTE — PROGRESS NOTES
Medical Nutrition Therapy Oncology Progress Note      Patient's PCP:Maico Patterson MD  Referring Provider: No ref. provider found  Subjective:        Patient ID: Cyrus Batres Jr. is a 73 y.o. male.    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    PEG (percutaneous endoscopic gastrostomy) adjustment/replacement/removal 11/22/2022    Postoperative 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    Tongue cancer     Tracheostomy dependence     Type 2 diabetes mellitus, without long-term current use of insulin 11/22/2022       Past Surgical History:   Procedure Laterality Date    CATARACT EXTRACTION W/  INTRAOCULAR LENS IMPLANT Right 8/16/2023    Procedure: CEIOL OD  NPO-peg;  Surgeon: Stoney Munoz MD;  Location: [facility] OR;  Service: Ophthalmology;  Laterality: Right;    CATARACT EXTRACTION W/  INTRAOCULAR LENS IMPLANT Left 10/18/2023    Procedure: CEIOL OS npo peg;  Surgeon: Stoney Munoz MD;  Location: [facility] OR;  Service: Ophthalmology;  Laterality: Left;    DIRECT LARYNGOBRONCHOSCOPY N/A 12/27/2021    Procedure: LARYNGOSCOPY, DIRECT, WITH BRONCHOSCOPY;  Surgeon: Flex Espinosa MD;  [facility];  Service: ENT;  Laterality: N/A;    DIRECT LARYNGOSCOPY  11/9/2022    Procedure: LARYNGOSCOPY, DIRECT;  Surgeon: Jesse James MD;  [facility];  Service: ENT;;    DISSECTION OF NECK Bilateral 1/6/2022    Procedure: DISSECTION, NECK;  Surgeon: Jesse James MD;   Location: [facility] OR [facility];  Service: ENT;  Laterality: Bilateral;    DISSECTION OF NECK Bilateral 11/9/2022    Procedure: DISSECTION, NECK;  Surgeon: Jesse James MD;  Location: [facility] OR [facility];  Service: ENT;  Laterality: Bilateral;    FLAP PROCEDURE Right 1/6/2022    Procedure: CREATION, FREE FLAP;  Surgeon: Alise Hart MD;  Location: [facility] OR [facility];  Service: ENT;  Laterality: Right;  Ischemic start 1351  Ischemic stop 1502    FLAP PROCEDURE Left 11/9/2022    Procedure: CREATION, FREE FLAP, ALT;  Surgeon: Alise Hart MD;  Location: [facility] OR [facility];  Service: ENT;  Laterality: Left;    GLOSSECTOMY Bilateral 11/9/2022    Procedure: TOTAL GLOSSECTOMY;  Surgeon: Jesse James MD;  [facility];  Service: ENT;  Laterality: Bilateral;    INSERTION OF TUNNELED CENTRAL VENOUS CATHETER (CVC) WITH SUBCUTANEOUS PORT N/A 6/9/2022    Procedure: [id number];  Surgeon: Jesus Viera MD;  Location: [facility];  Service: General;  Laterality: N/A;    INSERTION OF TUNNELED CENTRAL VENOUS CATHETER (CVC) WITH SUBCUTANEOUS PORT Right 1/12/2023    Procedure: [id number];  Surgeon: Abdulaziz Le Jr., MD;  Location: [facility];  Service: General;  Laterality: Right;    LARYNGECTOMY N/A 1/6/2022    Procedure: LARYNGECTOMY;  Surgeon: Jesse James MD;  [facility];  Service: ENT;  Laterality: N/A;    LARYNGOSCOPY N/A 8/4/2020    Procedure: Suspension microlaryngoscopy with biopsy, possible KTP laser treatment/excision;  Surgeon: Stew Noel MD;  [facility];  Service: ENT;  Laterality: N/A;  Microscope, telescopes, tower, microinstruments, KTP laser, rep conf# [id number] IC 7/28.    LARYNGOSCOPY N/A 3/16/2021    Procedure: Suspension microlaryngoscopy with excision of lesion, possible CO2 laser;  Surgeon: Stew Noel MD;  [facility];  Service: ENT;  Laterality: N/A;  Microscope, telescopes, tower, microinstruments, CO2  laser, rep conf# [id number] IC 3/4.    LARYNGOSCOPY N/A 4/1/2021    Procedure: Suspension microlaryngoscopy with KTP laser excision of lesion;  Surgeon: Stew Noel MD;  [facility];  Service: ENT;  Laterality: N/A;  Microscope, telescopes, tower, microinstruments, 70 degree scope, vocal fold , KTP laser, rep conf# [id number] BC    LARYNGOSCOPY N/A 12/9/2021    Procedure: Suspension microlaryngoscopy with biopsy;  Surgeon: Stew Noel MD;  [facility];  Service: ENT;  Laterality: N/A;  Microscope, telescopes, tower, microinstruments    LARYNGOSCOPY N/A 1/6/2022    Procedure: LARYNGOSCOPY;  Surgeon: Jesse James MD;  [facility];  Service: ENT;  Laterality: N/A;    LARYNGOSCOPY N/A 4/27/2022    Procedure: LARYNGOSCOPY WITH BIOPSY;  Surgeon: Jesse James MD;  [facility];  Service: ENT;  Laterality: N/A;    MICROLARYNGOSCOPY N/A 3/17/2020    Procedure: MICROLARYNGOSCOPY;  Surgeon: Jung Xiao MD;  Location: [facility];  Service: ENT;  Laterality: N/A;  Laser Microlaryngoscopy  NEED TO SCHEDULE LASER from [facility] [id number]    PHARYNGECTOMY  11/9/2022    Procedure: TOTAL PHARYNGECTOMY;  Surgeon: Jesse James MD;  Location: [facility] OR [address] [place] [address];  Service: ENT;;    REIMPLANTATION OF PARATHYROID TISSUE N/A 1/6/2022    Procedure: REIMPLANTATION, PARATHYROID TISSUE;  Surgeon: Jesse James MD;  [facility];  Service: ENT;  Laterality: N/A;    SKIN SPLIT GRAFT Right 11/9/2022    Procedure: APPLICATION, GRAFT, SKIN, SPLIT-THICKNESS;  Surgeon: Jesse James MD;  [facility];  Service: ENT;  Laterality: Right;    THYROIDECTOMY  1/6/2022    Procedure: THYROIDECTOMY;  Surgeon: Jesse James MD;  Location: [facility] OR [address] [place] [address];  Service: ENT;;    TRACHEOSTOMY N/A 12/27/2021    Procedure: CREATION, TRACHEOSTOMY;  Surgeon: Flex Espinosa MD;  Location: [facility] OR [address] [place] [address];  Service: ENT;   Laterality: N/A;       Social History     Socioeconomic History    Marital status:      Spouse name: Janel Batres    Number of children: 2   Occupational History    Occupation: [employer]     Employer: [employer]   Tobacco Use    Smoking status: Never    Smokeless tobacco: Never   Substance and Sexual Activity    Alcohol use: Not Currently     Comment: occasional    Drug use: No    Sexual activity: Yes     Partners: Female   Social History Narrative    ** Merged History Encounter **         2 children from his 1st wife     Social Determinants of Health     Financial Resource Strain: Low Risk  (8/16/2023)    Overall Financial Resource Strain (CARDIA)     Difficulty of Paying Living Expenses: Not hard at all   Food Insecurity: No Food Insecurity (8/16/2023)    Hunger Vital Sign     Worried About Running Out of Food in the Last Year: Never true     Ran Out of Food in the Last Year: Never true   Transportation Needs: No Transportation Needs (8/16/2023)    PRAPARE - Transportation     Lack of Transportation (Medical): No     Lack of Transportation (Non-Medical): No   Physical Activity: Sufficiently Active (8/16/2023)    Exercise Vital Sign     Days of Exercise per Week: 7 days     Minutes of Exercise per Session: 60 min   Stress: No Stress Concern Present (8/16/2023)    Citizen of the [place] of Occupational Health - Occupational Stress Questionnaire     Feeling of Stress : Not at all   Social Connections: Socially Isolated (8/16/2023)    Social Connection and Isolation Panel [NHANES]     Frequency of Communication with Friends and Family: Never     Frequency of Social Gatherings with Friends and Family: Never     Attends Judaism Services: Never     Active Member of Clubs or Organizations: No     Attends Club or Organization Meetings: Never     Marital Status:    Housing Stability: Low Risk  (8/16/2023)    Housing Stability Vital Sign     Unable to Pay for Housing in the Last Year: No     Number of Places  Lived in the Last Year: 1     Unstable Housing in the Last Year: No       Family History   Problem Relation Age of Onset    Abnormal EKG Mother     Diabetes Father     Heart disease Father     Hypertension Father        Review of patient's allergies indicates:   Allergen Reactions    Lovastatin Itching    Pollen extracts     Lovastatin Rash     Not confirmed but pt skeptical       Current Outpatient Medications:     acetaminophen (TYLENOL) 325 MG tablet, Take 325 mg by mouth every 6 (six) hours as needed for Pain., Disp: , Rfl:     calcitrioL (ROCALTROL) 1 mcg/mL solution, Give 0.25 mLs (0.25 mcg total) by Per G Tube route once daily., Disp: 15 mL, Rfl: 3    calcium carbonate 500 mg/5 mL (1,250 mg/5 mL), Take 20 mls four times daily with meals and nightly., Disp: 2300 mL, Rfl: 12    esomeprazole (NEXIUM) 40 MG capsule, 40 mg before breakfast., Disp: , Rfl:     famotidine (PEPCID) 40 mg/5 mL (8 mg/mL) suspension, Give 2.5 mLs (20 mg total) by Per G Tube route 2 (two) times daily., Disp: 50 mL, Rfl: 11    gabapentin (NEURONTIN) 300 MG capsule, Take 2 capsules (600 mg total) by mouth 3 (three) times daily., Disp: 180 capsule, Rfl: 0    guaiFENesin-codeine 100-10 mg/5 ml (TUSSI-ORGANIDIN NR)  mg/5 mL syrup, Take 5 mLs by mouth 3 (three) times daily as needed for Cough., Disp: 473 mL, Rfl: 0    ibuprofen (ADVIL,MOTRIN) 200 MG tablet, Take 200 mg by mouth every 6 (six) hours as needed for Pain., Disp: , Rfl:     lactose-reduced food with fibr (JEVITY 1.5 TRISHA) 0.06 gram-1.5 kcal/mL Liqd, 6 cartons per day via peg.  Flush 60ml before and after each bolus, Disp: 180 each, Rfl: 11    levothyroxine (SYNTHROID) 100 MCG tablet, 1 tablet (100 mcg total) by Per G Tube route before breakfast., Disp: 30 tablet, Rfl: 11    losartan (COZAAR) 100 MG tablet, Take 0.5 tablets (50 mg total) by mouth once daily. (Patient not taking: Reported on 11/7/2023), Disp: 45 tablet, Rfl: 3    scopolamine (TRANSDERM-SCOP) 1.3-1.5 mg (1 mg over  3 days), Place 1 patch onto the skin every 72 hours., Disp: 24 patch, Rfl: 3    sodium chloride 1,000 mg TbSO oral tablet, Take 1 tablet (1,000 mg total) by mouth 2 (two) times a day. Please crush the tablets for the PEG tube feeding or see if a capsule form is available. (Patient not taking: Reported on 11/7/2023), Disp: 100 tablet, Rfl: 2    traZODone (DESYREL) 50 MG tablet, TAKE 1 TABLET BY MOUTH NIGHTLY AS NEEDED FOR INSOMNIA., Disp: 90 tablet, Rfl: 1    zolpidem (AMBIEN) 5 MG Tab, 1 tablet (5 mg total) by Per G Tube route nightly as needed (difficult with sleep)., Disp: 30 tablet, Rfl: 0    All medications and past history have been reviewed.    OP HEAD NECK CISPLATIN WEEKLY + RADIOTHERAPY      Treatment Goal:   Curative      Status:   Active      Start Date:   1/23/2023      End Date:   3/6/2023      Provider:   Venu Tamez MD      Chemotherapy:   CISplatin (Platinol) 40 mg/m2 = 66 mg in sodium chloride 0.9% 631 mL chemo infusion, 40 mg/m2 = 66 mg, Intravenous, Clinic/HOD 1 time, 7 of 7 cycles    Administration: 66 mg (1/23/2023), 66 mg (2/13/2023), 66 mg (2/6/2023), 66 mg (2/20/2023), 66 mg (2/27/2023), 66 mg (3/6/2023)      Objective:      Wt Readings from Last 20 Encounters:   03/06/23 58.6 kg (129 lb 3.2 oz)   03/06/23 58.6 kg (129 lb 3.2 oz)   02/27/23 57.5 kg (126 lb 11.2 oz)   02/27/23 57.5 kg (126 lb 11.2 oz)   02/22/23 58.1 kg (128 lb)   02/20/23 58.3 kg (128 lb 9.6 oz)   02/13/23 58.5 kg (129 lb)   02/13/23 58.5 kg (129 lb)   02/07/23 60.1 kg (132 lb 7.9 oz)   02/06/23 56.8 kg (125 lb 3.2 oz)   02/06/23 56.8 kg (125 lb 3.2 oz)   01/31/23 58.7 kg (129 lb 6.6 oz)   01/30/23 56.4 kg (124 lb 7.2 oz)   01/30/23 56.4 kg (124 lb 7.2 oz)   01/27/23 58.5 kg (129 lb)   01/26/23 59 kg (130 lb 1.6 oz)   01/23/23 58.4 kg (128 lb 12 oz)   01/23/23 58.4 kg (128 lb 12 oz)   01/18/23 59.4 kg (130 lb 15.3 oz)   01/13/23 59.4 kg (130 lb 14.4 oz)       Last Labs:  Last Labs:  Glucose   Date Value Ref Range  Status   12/18/2023 202 (H) 70 - 110 mg/dL Final   11/17/2023 117 (H) 70 - 110 mg/dL Final     BUN   Date Value Ref Range Status   12/18/2023 28 (H) 8 - 23 mg/dL Final   11/17/2023 25 (H) 8 - 23 mg/dL Final     Creatinine   Date Value Ref Range Status   12/18/2023 1.1 0.5 - 1.4 mg/dL Final   11/17/2023 1.1 0.5 - 1.4 mg/dL Final     Sodium   Date Value Ref Range Status   12/18/2023 137 136 - 145 mmol/L Final   11/17/2023 140 136 - 145 mmol/L Final     Potassium   Date Value Ref Range Status   12/18/2023 3.8 3.5 - 5.1 mmol/L Final   11/17/2023 4.4 3.5 - 5.1 mmol/L Final     Phosphorus   Date Value Ref Range Status   11/24/2022 2.4 (L) 2.7 - 4.5 mg/dL Final   11/23/2022 3.6 2.7 - 4.5 mg/dL Final     Calcium   Date Value Ref Range Status   12/18/2023 8.4 (L) 8.7 - 10.5 mg/dL Final   11/17/2023 8.5 (L) 8.7 - 10.5 mg/dL Final     Prealbumin   Date Value Ref Range Status   09/03/2022 19 (L) 20.0 - 43.0 mg/dL Final     Total Protein   Date Value Ref Range Status   12/18/2023 7.1 6.0 - 8.4 g/dL Final   11/17/2023 7.2 6.0 - 8.4 g/dL Final     Cholesterol   Date Value Ref Range Status   05/17/2023 162 120 - 199 mg/dL Final     Comment:     The National Cholesterol Education Program (NCEP) has set the  following guidelines (reference ranges) for Cholesterol:  Optimal.....................<200 mg/dL  Borderline High.............200-239 mg/dL  High........................> or = 240 mg/dL     02/14/2022 144 120 - 199 mg/dL Final     Comment:     The National Cholesterol Education Program (NCEP) has set the  following guidelines (reference ranges) for Cholesterol:  Optimal.....................<200 mg/dL  Borderline High.............200-239 mg/dL  High........................> or = 240 mg/dL       Hemoglobin A1C   Date Value Ref Range Status   05/17/2023 5.1 4.0 - 5.6 % Final     Comment:     ADA Screening Guidelines:  5.7-6.4%  Consistent with prediabetes  >or=6.5%  Consistent with diabetes    High levels of fetal hemoglobin  "interfere with the HbA1C  assay. Heterozygous hemoglobin variants (HbS, HgC, etc)do  not significantly interfere with this assay.   However, presence of multiple variants may affect accuracy.     11/22/2022 5.8 4.5 - 6.2 % Final     Comment:     According to ADA guidelines, hemoglobin A1C <7.0% represents  optimal control in non-pregnant diabetic patients.  Different  metrics may apply to specific populations.   Standards of Medical Care in Diabetes - 2016.    For the purpose of screening for the presence of diabetes:  <5.7%     Consistent with the absence of diabetes  5.7-6.4%  Consistent with increasing risk for diabetes   (prediabetes)  >or=6.5%  Consistent with diabetes    Currently no consensus exists for use of hemoglobin A1C  for diagnosis of diabetes for children.       Hemoglobin   Date Value Ref Range Status   12/18/2023 13.0 (L) 14.0 - 18.0 g/dL Final   11/17/2023 12.5 (L) 14.0 - 18.0 g/dL Final     POC Hematocrit   Date Value Ref Range Status   11/09/2022 25 (L) 36 - 54 %PCV Final   11/09/2022 24 (L) 36 - 54 %PCV Final     Hematocrit   Date Value Ref Range Status   12/18/2023 37.5 (L) 40.0 - 54.0 % Final   11/17/2023 37.9 (L) 40.0 - 54.0 % Final     Iron   Date Value Ref Range Status   12/18/2023 118 45 - 160 ug/dL Final   11/17/2023 127 45 - 160 ug/dL Final     No components found for: "FROLATE"  Vit D, 25-Hydroxy   Date Value Ref Range Status   05/17/2023 40 30 - 96 ng/mL Final     Comment:     Vitamin D deficiency.........<10 ng/mL                              Vitamin D insufficiency......10-29 ng/mL       Vitamin D sufficiency........> or equal to 30 ng/mL  Vitamin D toxicity............>100 ng/mL     02/14/2022 37 30 - 96 ng/mL Final     Comment:     Vitamin D deficiency.........<10 ng/mL                              Vitamin D insufficiency......10-29 ng/mL       Vitamin D sufficiency........> or equal to 30 ng/mL  Vitamin D toxicity............>100 ng/mL       WBC   Date Value Ref Range Status "   12/18/2023 5.19 3.90 - 12.70 K/uL Final   11/17/2023 4.89 3.90 - 12.70 K/uL Final       Assessment:     Nutrition/Diet History     Patient Reported Diet/Restrictions/Preferences: sips of thin liquids  Food Allergies: NKFA  Factors Affecting Nutritional Intake: s/p laryngectomy, glossectomy    Estimated/Assessed Needs     Weight Used For Calorie Calculations: 62 kg (137 lb)  Energy Calorie Requirements (kcal): 6707-3392 kcal/day   Energy Need Method: 30-35 Kcal/kg  Protein Requirements:  g/day   Protein Need Method: 1.5-2.0 g/kg  Fluid Requirements: 2000 ml/day  Estimated Fluid Requirement Method: 1ml/kcal      Nutrition Support  Current EN: Jevity 1.5- 6 cartons per day with 60ml FWF before and after each bolus. Provides 2130 calories, 90g protein and 1800ml FW. Recommend extra 60ml FWF TID to meet estimated fluid needs.    Evaluation of Received Nutrient/Fluid Intake     Calorie Intake: meeting needs  Protein Intake: meeting needs  Fluid Intake: meeting needs  Tolerance: tolerating  % Intake of Estimated Energy Needs: 100 % via peg      Nutrition Diagnosis Related to (Etiology) As Evidenced By (Signs/Symptoms)   Unintended weight loss Physiological causes increasing nutrient needs BoT cancer, Estimated intake of protein insufficient to meet requirements     RD Notes  Mr. Cyrus Batres is a 71y/o male with 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. Pt is schedule for glossectomy surgery for his base of tongue cancer in 1 month. He is currently NPO and recently switch to Jevity 1.5 and is tolerating 6 cartons per day without associated N/V/D, gas or bloating. He does have chronic diarrhea not associated with feeding times. He present today with questions regarding how he can increase his nutrition to prepare for surgery next month. He reports he would like to switch to Anjuke to promote regular BMs and help control BG. He reports recently weight gain of 7-8# since  increasing his TF to 6 cartons per day. CW: 137#    1/23/22: RD met with Mr Batres infusion today for nutrition follow up. Pt will be starting new treatment with cisplatin and concurrent XRT s/p extensive resection last month. Pt denies having any current problems with peg feedings. He has good knowledge of nutrition related side effects of treatment. Denies N/V/D/C. BMs normal. CW: 128#    1/30: Pt reports he continues to bolus 5 cartons of Jevity per day without s/s of intolerance. He is working himself up to 6 cartons per day due to increased needs during CCXRT. He denies N/V/D/C. He reports good hydration via peg. Noted 43 weight loss in 1 week but pt reports he was wearing a heavy coat last week when his weight was taken. CW: 124#    2/6: Pt reports good tolerance to treatment so far. Denies any N/V/D/C, gas or bloating with EN. Continues to work himself up to 6 cartons per day with extra fluids via peg. He denies having any nutrition related questions or concerns at the current time. CW: 125#    2/13: RD met with Mr Batres for nutrition follow up today. Pt is starting cycle 3 of treatment today and denies any N/V/D/C, gas, bloating. He denies having any problems with his peg. He reports he was cleared by ST and his surgeon  to take sips of liquids including smoothies. He denies having any nutrition related questions or concerns at the current time. CW: 129#    2/27: Pt reports good tolerance to his treatment but has been having some nausea over the last week. He continues to use his peg for majority of intake but has started sipping on juice and thin smoothies with ST. He denies having any nutrition related questions or concerns at the current time. CW: 126#    3/6: Pt reports continued nausea/upset stomach but otherwise is tolerating treatment well. This is his last week of CCXRT. He is tolerating his EN well and has been trying to increase intake of smoothies po with help of ST. He denies having any nutrition  related questions or concerns at the current time. CW: 129#    1/3/23: Pt reports good tolerance to 6 cartons of Jevity via peg. He reports he has changes insurance and his current infusion company does not accept his new insurance. Pt is requesting a referral to a new infusion company today. CW: 145#    Nutrition Intervention:      Nutrition Intervention Enteral Nutrition  Composition   Goals/Expected Outcomes Continue EN at 6 cartons per day to meet estimated nutritional needs and promote regular BMs   Progress Progressing towards goal     Nutrition Intervention Fluid-modified diet   Goals/Expected Outcomes Continue aggressive hydration via peg to prevent dehydration during therapy   Progress Initial     Plan  Referral placed to [facility] for EN supplies and formula  Continue EN at goal rate of 6 cartons per day to meet 100% of estimated needs via peg  Continue aggressive hydration via peg to prevent dehydration  Provided RD contact info. Encouraged pt to contact RD with questions or concerns    Monitoring/Evaluation:     Monitor: TF tolerance, weight, BMs    Next Visit: f/u as needed      I have explained and the patient understands all of  the current recommendation(s). I have answered all of their questions to the best of my ability and to their complete satisfaction.   The patient is to continue with the current management plan.    Electronically signed by: Barbara Zayas MBA, RDN, LDN

## 2024-01-04 ENCOUNTER — CLINICAL SUPPORT (OUTPATIENT)
Dept: REHABILITATION | Facility: HOSPITAL | Age: 74
End: 2024-01-04
Payer: MEDICARE

## 2024-01-04 DIAGNOSIS — I89.0 LYMPHEDEMA DUE TO RADIATION: Primary | ICD-10-CM

## 2024-01-04 PROCEDURE — 97140 MANUAL THERAPY 1/> REGIONS: CPT | Mod: PO

## 2024-01-04 PROCEDURE — 97110 THERAPEUTIC EXERCISES: CPT | Mod: PO

## 2024-01-05 NOTE — PROGRESS NOTES
OccupationalTherapy Daily Treatment Note     Name: Cyrus Batres Jr.  Clinic Number: [id number]    Therapy Diagnosis:   Encounter Diagnosis   Name Primary?    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 He was compliant with home exercise program.  Functional change: none    Pain: 0/10  Location: neck     Objective     Response to previous treatment: The area below the chin is much softer and the area has visibly reduced below the chin.  The scar is more mobile.  There was more wrinkling of the skin noted.   Treatment:   Nicolás received the following manual therapy techniques:- Manual Lymphatic Drainage and Soft tissue Mobilization were performed to the: face and neck for 60 minutes, including: Manual Lymph Drainage and short stretch compression bandaging     MANUAL LYMPHATIC DRAINAGE:  Drainage of the Right and Left upper quadrant from the axilla extending up to the neck, over the ear and to the side of the face to the jaw line.  While supine with head elevate,  Drainage provided from the front of the neck, B subclavian regions,  Across ant chest and top of shoulder,  drainage of the entire face with return to the axillas.     Kinesio tape was applied to the scar in a I strip with 50 % tension from proximal to distal  to increase lymph uptake and direct lymph flow.      Nicolás received therapeutic exercises to develop cardiac output for 8 minutes including:   THERAPEUTIC EXERCISE- Patient performed Nustep therex for 8 min set at 3 resistance to increase cardiac output and increase lymph circulation.   THERAPEUTIC EXERCISES:  Continue Home Exercise Program of Active Range Of Motion, stretching, and postural correction.     Home Exercises Provided and Patient Education Provided     Education provided:      PATIENT/FAMILY Education: garment wearing schedule,  Home Exercise Program,  Beginning of self massage,  Self or assisted bandaging , and Risk reduction    KINESIO TAPE: Nicolás was educated on kinesio tape wearing schedule and to remove if having ill effects.  Nicolás verbalized understanding of the above education.      Written Home Exercises Provided: Nicolás demonstrated good  understanding of the education provided.       Assessment     He arrived to the session and the area below the chin was not as pronounced as it was on the evaluation.  He is using a chip bag to address the fibrotic fluid below the chin.  He tolerated Manual lymph drainage with out an issue.  He brought in the silicone gel pads that were recommended to be placed along the scar on the neck which is keeping fluid below his chin trapped but they are not staying in place due to the moist air coming out of the trach.  It was recommended that he back them away from the trach.  Kinesio tape is being used as a manual technique to increase lymph flow, direct lymph uptake and improve scar mobility.  The area below the chin was softer at the end of the session.    Nicolás Is progressing well towards his goals.   Patient  prognosis is Good.     Patient will continue to benefit from skilled outpatient occupational therapy to address the deficits listed in the problem list box on initial evaluation, provide pt/family education and to maximize pt's level of independence in the home and community environment.     Patient  's spiritual, cultural and educational needs considered and patient agreeable to plan of care and goals.     Anticipated barriers to occupational therapy: non verbal    Goals    Short Term Goals: (4 weeks)  In Progress  Patient to be Independent and compliant with Home Exercise Program.  In Progress  Decrease girth in the face and neck by 6 cm for improved neck mobility.  In Progress  Patient will demonstrate 100% knowledge of lymphedema precautions and signs of infection.   In Progress  Patient will perform self-scar mobility techniques.  In Progress  Patient will perform self lymph drainage techniques.  In Progress  Patient will tolerate compression garment wearing schedule.     Long Term Goals: (12 weeks)  In Progress  Decrease girth in face and neck by 10 cm for improved neck mobility.  In Progress  Patient will show reduction in girth to mild or less with improved contour of face and neck.  In Progress  Patient to malinda/doff compression garment with daily compliance.       Plan      Patient to be seen 1-2 x per week for 90 days for the medically necessary treatments to include: decongestive massage- Manual lymphatic drainage, kinesio tape, scar massage, education in lymphedema precautions, self lymph drainage massage, and assistance in obtaining appropriate compression garment. Therapeutic exercise, postural correction, and progression of Home Exercise Program.       Thania Plata, OT, CLT

## 2024-01-08 ENCOUNTER — CLINICAL SUPPORT (OUTPATIENT)
Dept: REHABILITATION | Facility: HOSPITAL | Age: 74
End: 2024-01-08
Payer: MEDICARE

## 2024-01-08 ENCOUNTER — PATIENT MESSAGE (OUTPATIENT)
Dept: HEMATOLOGY/ONCOLOGY | Facility: CLINIC | Age: 74
End: 2024-01-08

## 2024-01-08 DIAGNOSIS — L90.5 SCAR CONDITIONS AND FIBROSIS OF SKIN: ICD-10-CM

## 2024-01-08 DIAGNOSIS — R29.898 DECREASED ROM OF NECK: ICD-10-CM

## 2024-01-08 DIAGNOSIS — I89.0 LYMPHEDEMA DUE TO RADIATION: Primary | ICD-10-CM

## 2024-01-08 PROCEDURE — 97140 MANUAL THERAPY 1/> REGIONS: CPT | Mod: PO

## 2024-01-08 PROCEDURE — 97110 THERAPEUTIC EXERCISES: CPT | Mod: PO

## 2024-01-08 NOTE — PROGRESS NOTES
OccupationalTherapy Daily Treatment Note     Name: Cyrus Batres Jr.  Clinic Number: [id number]    Therapy Diagnosis:   Encounter Diagnosis   Name Primary?    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  Functional change: none    Pain: 0/10  Location: neck     Objective     Response to previous treatment: The area below the chin is much softer and the area has visibly reduced below the chin.  The scar is more mobile.  There was more wrinkling of the skin noted. The jaw line is more palpable and the face is more narrow.  Treatment:   Nicolás received the following manual therapy techniques:- Manual Lymphatic Drainage and Soft tissue Mobilization were performed to the: face and neck for 60 minutes, including: Manual Lymph Drainage and short stretch compression bandaging     MANUAL LYMPHATIC DRAINAGE:  Drainage of the Right and Left upper quadrant from the axilla extending up to the neck, over the ear and to the side of the face to the jaw line.  While supine with head elevate,  Drainage provided from the front of the neck, B subclavian regions,  Across ant chest and top of shoulder,  drainage of the entire face with return to the axillas.        Nicolás received therapeutic exercises to develop cardiac output for 8  minutes including:   THERAPEUTIC EXERCISE- Patient performed Nustep therex for 8 min set at 3 resistance to increase cardiac output and increase lymph circulation.   THERAPEUTIC EXERCISES:  Continue Home Exercise Program of Active Range Of Motion, stretching, and postural correction.     Home Exercises Provided and Patient Education Provided     Education provided:      PATIENT/FAMILY Education: garment wearing schedule,  Home Exercise Program,  Beginning of self massage,  Self or assisted bandaging , and Risk reduction    KINESIO TAPE: Nicolás was educated on kinesio tape wearing schedule and to remove if having ill effects.  Nicolás verbalized understanding of the above education.      Written Home Exercises Provided: Nicolás demonstrated good  understanding of the education provided.       Assessment     He arrived to the session and the area below the chin was not as pronounced as it was on the evaluation.  He is using a chip bag to address the fibrotic fluid below the chin.  He tolerated Manual lymph drainage with out an issue.  He brought in the silicone gel pads that were recommended to be placed along the scar on the neck which is keeping fluid below his chin trapped but they are not staying in place due to the moist air coming out of the trach.  It was recommended that he back them away from the trach.  Kinesio tape is being used as a manual technique to increase lymph flow, direct lymph uptake and improve scar mobility.  The area below the chin was softer at the end of the session.    Nicolás Is progressing well towards his goals.   Patient  prognosis is Good.     Patient will continue to benefit from skilled outpatient occupational therapy to address the deficits listed in the problem list box on initial evaluation, provide pt/family education and to maximize pt's level of independence in the home and community environment.     Patient 's spiritual, cultural and educational needs considered and patient  agreeable to plan of care and goals.     Anticipated barriers to occupational therapy: non verbal    Goals    Short Term Goals: (4 weeks)  In Progress  Patient to be Independent and compliant with Home Exercise Program.  In Progress  Decrease girth in the face and neck by 6 cm for improved neck mobility.  In Progress  Patient will demonstrate 100% knowledge of lymphedema precautions and signs of infection.   In Progress  Patient will perform self-scar mobility techniques.  In Progress  Patient will perform self lymph drainage techniques.  In Progress  Patient will tolerate compression garment wearing schedule.     Long Term Goals: (12 weeks)  In Progress  Decrease girth in face and neck by 10 cm for improved neck mobility.  In Progress  Patient will show reduction in girth to mild or less with improved contour of face and neck.  In Progress  Patient to malinda/doff compression garment with daily compliance.       Plan      Patient to be seen 1-2 x per week for 90 days for the medically necessary treatments to include: decongestive massage- Manual lymphatic drainage, kinesio tape, scar massage, education in lymphedema precautions, self lymph drainage massage, and assistance in obtaining appropriate compression garment. Therapeutic exercise, postural correction, and progression of Home Exercise Program.       Thania Plata, OT, CLT

## 2024-01-09 ENCOUNTER — PATIENT MESSAGE (OUTPATIENT)
Dept: OTOLARYNGOLOGY | Facility: CLINIC | Age: 74
End: 2024-01-09
Payer: MEDICARE

## 2024-01-09 ENCOUNTER — LAB VISIT (OUTPATIENT)
Dept: LAB | Facility: HOSPITAL | Age: 74
End: 2024-01-09
Attending: PHYSICIAN ASSISTANT
Payer: MEDICARE

## 2024-01-09 DIAGNOSIS — Z79.4 TYPE 2 DIABETES MELLITUS WITH HYPERGLYCEMIA, WITH LONG-TERM CURRENT USE OF INSULIN: ICD-10-CM

## 2024-01-09 DIAGNOSIS — E11.65 TYPE 2 DIABETES MELLITUS WITH HYPERGLYCEMIA, WITH LONG-TERM CURRENT USE OF INSULIN: ICD-10-CM

## 2024-01-09 LAB
ALBUMIN SERPL BCP-MCNC: 4.1 G/DL (ref 3.5–5.2)
ALP SERPL-CCNC: 145 U/L (ref 55–135)
ALT SERPL W/O P-5'-P-CCNC: 32 U/L (ref 10–44)
ANION GAP SERPL CALC-SCNC: 12 MMOL/L (ref 8–16)
AST SERPL-CCNC: 31 U/L (ref 10–40)
BILIRUB SERPL-MCNC: 1.4 MG/DL (ref 0.1–1)
BUN SERPL-MCNC: 21 MG/DL (ref 8–23)
CA-I BLDV-SCNC: 1.23 MMOL/L (ref 1.06–1.42)
CALCIUM SERPL-MCNC: 9.1 MG/DL (ref 8.7–10.5)
CHLORIDE SERPL-SCNC: 102 MMOL/L (ref 95–110)
CO2 SERPL-SCNC: 24 MMOL/L (ref 23–29)
CREAT SERPL-MCNC: 1.3 MG/DL (ref 0.5–1.4)
EST. GFR  (NO RACE VARIABLE): 58.4 ML/MIN/1.73 M^2
ESTIMATED AVG GLUCOSE: 134 MG/DL (ref 68–131)
GLUCOSE SERPL-MCNC: 133 MG/DL (ref 70–110)
HBA1C MFR BLD: 6.3 % (ref 4–5.6)
POTASSIUM SERPL-SCNC: 3.9 MMOL/L (ref 3.5–5.1)
PROT SERPL-MCNC: 7.3 G/DL (ref 6–8.4)
PTH-INTACT SERPL-MCNC: 7.2 PG/ML (ref 9–77)
SODIUM SERPL-SCNC: 138 MMOL/L (ref 136–145)
T4 FREE SERPL-MCNC: 0.92 NG/DL (ref 0.71–1.51)
TSH SERPL DL<=0.005 MIU/L-ACNC: 5.5 UIU/ML (ref 0.4–4)

## 2024-01-09 PROCEDURE — 82330 ASSAY OF CALCIUM: CPT | Performed by: PHYSICIAN ASSISTANT

## 2024-01-09 PROCEDURE — 84443 ASSAY THYROID STIM HORMONE: CPT | Performed by: PHYSICIAN ASSISTANT

## 2024-01-09 PROCEDURE — 36415 COLL VENOUS BLD VENIPUNCTURE: CPT | Mod: PO | Performed by: PHYSICIAN ASSISTANT

## 2024-01-09 PROCEDURE — 83970 ASSAY OF PARATHORMONE: CPT | Performed by: PHYSICIAN ASSISTANT

## 2024-01-09 PROCEDURE — 83036 HEMOGLOBIN GLYCOSYLATED A1C: CPT | Performed by: PHYSICIAN ASSISTANT

## 2024-01-09 PROCEDURE — 84439 ASSAY OF FREE THYROXINE: CPT | Performed by: PHYSICIAN ASSISTANT

## 2024-01-09 PROCEDURE — 80053 COMPREHEN METABOLIC PANEL: CPT | Performed by: PHYSICIAN ASSISTANT

## 2024-01-11 ENCOUNTER — CLINICAL SUPPORT (OUTPATIENT)
Dept: REHABILITATION | Facility: HOSPITAL | Age: 74
End: 2024-01-11
Payer: MEDICARE

## 2024-01-11 DIAGNOSIS — R29.898 DECREASED ROM OF NECK: ICD-10-CM

## 2024-01-11 DIAGNOSIS — L90.5 SCAR CONDITIONS AND FIBROSIS OF SKIN: ICD-10-CM

## 2024-01-11 DIAGNOSIS — I89.0 LYMPHEDEMA DUE TO RADIATION: Primary | ICD-10-CM

## 2024-01-11 PROCEDURE — 97110 THERAPEUTIC EXERCISES: CPT | Mod: PO

## 2024-01-11 PROCEDURE — 97140 MANUAL THERAPY 1/> REGIONS: CPT | Mod: PO

## 2024-01-12 NOTE — PROGRESS NOTES
OccupationalTherapy Daily Treatment Note     Name: Cyrus Batres Jr.  Clinic Number: [id number]    Therapy Diagnosis:   Encounter Diagnoses   Name Primary?    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  Functional change: none    Pain: 0/10  Location: neck     Objective     Response to previous treatment: The area below the chin is much softer and the area has visibly reduced below the chin.  The scar is more mobile.  There was more wrinkling of the skin noted. The jaw line is more palpable and the face is more narrow.  Treatment:   Nicolás received the following manual therapy techniques:- Manual Lymphatic Drainage and Soft tissue Mobilization were performed to the: face and neck for 60 minutes, including: Manual Lymph Drainage and short stretch compression bandaging     MANUAL LYMPHATIC DRAINAGE:  Drainage of the Right and Left upper quadrant from the axilla extending up to the neck, over the ear and to the side of the face to the jaw line.  While supine with head elevate,  Drainage provided from the front of the neck, B subclavian regions,  Across ant chest and top of shoulder,  drainage of the entire face with return to the axillas.      Kinesio tape was applied to the neck along the scar in an I strip  application with 50% tension from left to right to increase lymph uptake and direct lymph flow.     Nicolás received therapeutic exercises to develop cardiac output for 8 minutes including:   THERAPEUTIC EXERCISE- Patient performed Nustep therex for 8 min set at 3 resistance to increase cardiac output and increase lymph circulation.   THERAPEUTIC EXERCISES:  Continue Home Exercise Program of Active Range Of Motion, stretching, and postural correction.     Home Exercises Provided and Patient Education Provided     Education provided:      PATIENT/FAMILY Education: garment wearing schedule,  Home Exercise Program,  Beginning of self massage,  Self or assisted bandaging , and Risk reduction    KINESIO TAPE: Nicolás was educated on kinesio tape wearing schedule and to remove if having ill effects.  Nicolás verbalized understanding of the above education.      Written Home Exercises Provided: Nicolás demonstrated good  understanding of the education provided.       Assessment     He arrived to the session and the area below the chin was not as pronounced as it was on the evaluation.  He is using a chip bag to address the fibrotic fluid below the chin.  He tolerated Manual lymph drainage with out an issue.  He brought in the silicone gel pads that were recommended to be placed along the scar on the neck which is keeping fluid below his chin trapped but they are not staying in place due to the moist air coming out of the trach.  It was recommended that he back them away from the trach.  Kinesio tape is being used as a manual technique to increase lymph flow, direct lymph uptake and improve scar mobility.  The area below the chin was softer at the end of the session.    Nicolás Is progressing well towards his goals.   Patient  prognosis is Good.     Patient will continue to benefit from skilled outpatient occupational therapy to address the deficits listed in the problem list box on initial evaluation, provide pt/family  education and to maximize pt's level of independence in the home and community environment.     Patient 's spiritual, cultural and educational needs considered and patient agreeable to plan of care and goals.     Anticipated barriers to occupational therapy: non verbal    Goals    Short Term Goals: (4 weeks)  In Progress  Patient to be Independent and compliant with Home Exercise Program.  In Progress  Decrease girth in the face and neck by 6 cm for improved neck mobility.  In Progress  Patient will demonstrate 100% knowledge of lymphedema precautions and signs of infection.   In Progress  Patient will perform self-scar mobility techniques.  In Progress  Patient will perform self lymph drainage techniques.  In Progress  Patient will tolerate compression garment wearing schedule.     Long Term Goals: (12 weeks)  In Progress  Decrease girth in face and neck by 10 cm for improved neck mobility.  In Progress  Patient will show reduction in girth to mild or less with improved contour of face and neck.  In Progress  Patient to malinda/doff compression garment with daily compliance.       Plan      Patient to be seen 1-2 x per week for 90 days for the medically necessary treatments to include: decongestive massage- Manual lymphatic drainage, kinesio tape, scar massage, education in lymphedema precautions, self lymph drainage massage, and assistance in obtaining appropriate compression garment. Therapeutic exercise, postural correction, and progression of Home Exercise Program.       Thania Plata, OT, CLT

## 2024-01-16 ENCOUNTER — TELEPHONE (OUTPATIENT)
Dept: OTOLARYNGOLOGY | Facility: CLINIC | Age: 74
End: 2024-01-16
Payer: MEDICARE

## 2024-01-18 ENCOUNTER — CLINICAL SUPPORT (OUTPATIENT)
Dept: SPEECH THERAPY | Facility: HOSPITAL | Age: 74
End: 2024-01-18
Payer: MEDICARE

## 2024-01-18 ENCOUNTER — OFFICE VISIT (OUTPATIENT)
Dept: OTOLARYNGOLOGY | Facility: CLINIC | Age: 74
End: 2024-01-18
Payer: MEDICARE

## 2024-01-18 VITALS — SYSTOLIC BLOOD PRESSURE: 135 MMHG | HEART RATE: 77 BPM | DIASTOLIC BLOOD PRESSURE: 77 MMHG

## 2024-01-18 DIAGNOSIS — R49.0 ALARYNGEAL VOICE: Primary | ICD-10-CM

## 2024-01-18 DIAGNOSIS — C32.9 LARYNX CANCER: Primary | ICD-10-CM

## 2024-01-18 DIAGNOSIS — Z90.02 S/P LARYNGECTOMY: ICD-10-CM

## 2024-01-18 DIAGNOSIS — R13.14 DYSPHAGIA, PHARYNGOESOPHAGEAL: ICD-10-CM

## 2024-01-18 PROCEDURE — 92507 TX SP LANG VOICE COMM INDIV: CPT | Mod: GN

## 2024-01-18 PROCEDURE — 99499 UNLISTED E&M SERVICE: CPT | Mod: S$GLB,,, | Performed by: OTOLARYNGOLOGY

## 2024-01-18 PROCEDURE — 99999 PR PBB SHADOW E&M-EST. PATIENT-LVL III: CPT | Mod: PBBFAC,,, | Performed by: OTOLARYNGOLOGY

## 2024-01-18 NOTE — PROGRESS NOTES
DIAGNOSIS:  Alaryngeal voice (R49.0), s/p laryngectomy (Z90.02), History laryngeal cancer (Z85.21), History head and neck radiation (Z92.3) and History chemotherapy (Z92.21)  REFERRING DOCTOR:  Jesse James M.D., Head and Neck Surgical Oncologist     REASON FOR VISIT:   1/18/24: Mr Batres returns for f/u of communication, airway/stoma care, pulmonary care and swallow with known hx of 1/6/22 total laryngectomy, further compromised by Recurrent squamous cell carcinoma with 11/9/22 TOTAL PHARYNGECTOMY, TOTAL GLOSSECTOMY (Bilateral)  CREATION, FREE FLAP, ALT Left anterolateral thigh free flap for coverage of pharyngeal defect as well as glossectomy defect. Completed adjuvant chemoradiation therapy with platinum sensitization to 66 Gy ending March 9, 2023. Today he reports having poor success with LaryButton as coughing causes it to be expectorated from stoma, as well as having bleeding in stoma since use of LaryButton.     12/21/23: Mr Cyrus Batres presents for re-evaluation of communication, airway/stoma care, pulmonary care and swallow with known hx of 1/6/22 total laryngectomy, further compromised by Recurrent squamous cell carcinoma with 11/9/22 TOTAL PHARYNGECTOMY, TOTAL GLOSSECTOMY (Bilateral)  CREATION, FREE FLAP, ALT Left anterolateral thigh free flap for coverage of pharyngeal defect as well as glossectomy defect. Completed adjuvant chemoradiation therapy with platinum sensitization to 66 Gy ending March 9, 2023.  Treatment very well tolerated. He presents today seeking help with HMEs and stoma care. He would like to be rid of strap holding LaryTube. He is PEG dependent, occasionally sips water. Is mostly suctioning mouth throat day due to thick secretions. Has had botox and uses scopolamine patch to control excess saliva. He describes trismus setting in shortly after glossectomy. He does not perform stretches for jaw with any frequency. He is content with tube feeds, he is maintaining weight. Communicates  "with his phone with text to talk brittany or mouthing words.         2/17/22: Mr Batres returns reporting resolution of blood in stoma. He is on puree diet without any reported swallow difficulty. He has been using electrolarynx with family and friends, his wife understands him fairly well. Overall, he feels he is improving including gaining weight.      1/28/22: Nicolás Batres returns today reporting blood in stoma, filling larytube at times. This occurred on 1 day, he was concerned he became too dry following taking Benadryl. He continues to be NPO. He has been practicing with electrolarynx, wife states she understands "maybe half of what he says." They are both somewhat frustrated with communication with EL. He feels that neck is tight, thinks it is related to staples. He reports continued weight loss or at least no weight gain. He is hungry, despite feeding tube use of 5 cans per day. He is meeting with dietician today.      1/20/22: Cyrus Batres Jr. returned to clinic today for electrolarynx train, obtaining supplies, stoma care.  He was un/accompanied by his wife. He states he has gained a few lbs since hospital discharge. He is currently NPO, relying on feeding tube for nutrition. He describes gurgling sound after tube feeds, can taste the formula. He is using 4-5 cans a day. "Doesn't feel full or hungry." 1 carton at a time with water, upright endorses reflux even if he stays upright for an hour. "Food water and medicines go down initially and then come back up" kind of regurgitate into the tube and it won't clear. He and wife are managing stoma well, he is wearing Larytube and HMEs.     Oncology History   Larynx cancer   8/4/2020 Initial Diagnosis    Larynx neoplasm malignant     8/6/2020 Tumor Conference    His case was discussed at the Multidisciplinary Head and Neck Team Planning Meeting.    Representatives from Medical Oncology, Radiation Oncology, Head and Neck Surgical Oncology, Psychosocial Oncology, and " Speech and Language Pathology discussed the case with the following recommendations:    1) definitive radiation              8/6/2020 Cancer Staged    Staging form: Larynx - Glottis, AJCC 8th Edition  - Clinical stage from 8/6/2020: Stage II (cT2, cN0, cM0)     8/6/2020 Cancer Staged    Cancer Staging  Larynx neoplasm malignant  Staging form: Larynx - Glottis, AJCC 8th Edition  - Clinical stage from 8/6/2020: Stage II (cT2, cN0, cM0) - Signed by Fariha Muñoz NP on 8/6/2020 12/16/2021 Tumor Conference    His case was discussed at the Multidisciplinary Head and Neck Team Planning Meeting.    Representatives from Medical Oncology, Radiation Oncology, Head and Neck Surgical Oncology, Psychosocial Oncology, and Speech and Language Pathology discussed the case with the following recommendations:    1) TL  2) oncology referral  3) psychology referral            12/27/2021 Surgery    1. DL And tracheostomy  2. PEG     1/6/2022 Surgery    1. Diagnostic direct laryngoscopy  2. Bilateral modified neck dissection of levels 2 through 4  3. Total laryngectomy  4. Total thyroidectomy with bilateral paratracheal/upper mediastinal neck dissection  5. Autotransplantation of left inferior parathyroid into left sternocleidomastoid muscle   6. Left anterolateral thigh free flap for closure of total laryngectomy neck skin defect  7. Advancement flap for closure of left thigh donor site advanced area was 25 x 10 cm     1/20/2022 Cancer Staged    Staging form: Larynx - Glottis, AJCC 8th Edition  - Pathologic stage from 1/20/2022: Stage LOU (pT4a, pN0, cM0)     5/30/2022 Cancer Staged    Staging form: Pharynx - P16 Negative Oropharynx, AJCC 8th Edition  - Clinical: Stage II (rcT2, cN0, cM0)     1/23/2023 -  Chemotherapy    Treatment Summary   Plan Name: OP HEAD NECK CISPLATIN WEEKLY + RADIOTHERAPY  Treatment Goal: Curative  Status: Active  Start Date: 1/23/2023  End Date: 3/6/2023  Provider: Venu Tamez MD  Chemotherapy:  CISplatin (Platinol) 40 mg/m2 = 66 mg in sodium chloride 0.9% 631 mL chemo infusion, 40 mg/m2 = 66 mg, Intravenous, Clinic/HOD 1 time, 7 of 7 cycles  Administration: 66 mg (1/23/2023), 66 mg (2/13/2023), 66 mg (2/6/2023), 66 mg (2/20/2023), 66 mg (2/27/2023), 66 mg (3/6/2023)     Malignant neoplasm of base of tongue   5/20/2022 Cancer Staged    Staging form: Pharynx - P16 Negative Oropharynx, AJCC 8th Edition  - Clinical stage from 5/20/2022: Stage II (cT2, cN0, cM0, p16-)     6/22/2022 Initial Diagnosis    Primary cancer of base of tongue     7/11/2022 - 8/26/2022 Chemotherapy    Treatment Summary   Plan Name: OP HEAD NECK PEMBROLIZUMAB CARBOPLATIN FLUOROURACIL Q3W FOLLOWED BY MAINTENANCE PEMBROLIZUMAB 400 MG Q6W  Treatment Goal: Palliative  Status: Inactive  Start Date: 7/11/2022  End Date: 8/26/2022  Provider: Aurash Khoobehi, MD  Chemotherapy: CARBOplatin (PARAPLATIN) 440 mg in sodium chloride 0.9% 544 mL chemo infusion, 440 mg (100 % of original dose 438.5 mg), Intravenous, Clinic/HOD 1 time, 3 of 6 cycles  Dose modification:   (original dose 438.5 mg, Cycle 1)  Administration: 440 mg (7/11/2022), 435 mg (8/1/2022), 510 mg (8/22/2022)     1/23/2023 -  Chemotherapy    Treatment Summary   Plan Name: OP HEAD NECK CISPLATIN WEEKLY + RADIOTHERAPY  Treatment Goal: Curative  Status: Active  Start Date: 1/23/2023  End Date: 3/6/2023  Provider: Venu Tamez MD  Chemotherapy: CISplatin (Platinol) 40 mg/m2 = 66 mg in sodium chloride 0.9% 631 mL chemo infusion, 40 mg/m2 = 66 mg, Intravenous, Clinic/HOD 1 time, 7 of 7 cycles  Administration: 66 mg (1/23/2023), 66 mg (2/13/2023), 66 mg (2/6/2023), 66 mg (2/20/2023), 66 mg (2/27/2023), 66 mg (3/6/2023)       Past Medical History:   Diagnosis Date    Abnormal CT scan, neck 09/22/2022    Allergy     pollen extracts    Atrial fibrillation     Chronic anticoagulation     Diabetes mellitus, type 2     GRIFFIN (dyspnea on exertion)     Encounter for blood transfusion     GERD  (gastroesophageal reflux disease)     Gout, unspecified     Hypertension     Hypothyroidism 11/22/2022    Iron deficiency anemia 8/23/2023    Iron deficiency anemia 8/23/2023    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    PEG (percutaneous endoscopic gastrostomy) adjustment/replacement/removal 11/22/2022    Postoperative 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    Tongue cancer     Tracheostomy dependence     Type 2 diabetes mellitus, without long-term current use of insulin 11/22/2022     Current Outpatient Medications on File Prior to Visit   Medication Sig Dispense Refill    acetaminophen (TYLENOL) 325 MG tablet Take 325 mg by mouth every 6 (six) hours as needed for Pain.      calcitrioL (ROCALTROL) 1 mcg/mL solution Give 0.25 mLs (0.25 mcg total) by Per G Tube route once daily. 15 mL 3    calcium carbonate 500 mg/5 mL (1,250 mg/5 mL) Take 20 mls four times daily with meals and nightly. 2300 mL 12    esomeprazole (NEXIUM) 40 MG capsule 40 mg before breakfast.      famotidine (PEPCID) 40 mg/5 mL (8 mg/mL) suspension Give 2.5 mLs (20 mg total) by Per G Tube route 2 (two) times daily. 50 mL 11    gabapentin (NEURONTIN) 300 MG capsule Take 2 capsules (600 mg total) by mouth 3 (three) times daily. 180 capsule 0    guaiFENesin-codeine 100-10 mg/5 ml (TUSSI-ORGANIDIN NR)  mg/5 mL syrup Take 5 mLs by mouth 3 (three) times daily as needed for Cough. 473 mL 0    ibuprofen (ADVIL,MOTRIN) 200 MG tablet Take 200 mg by mouth every 6 (six) hours as needed for Pain.      lactose-reduced food with fibr (JEVITY 1.5 TRISHA) 0.06 gram-1.5 kcal/mL Liqd 6 cartons per day via peg.  Flush 60ml before and after each bolus 180 each 11    levothyroxine (SYNTHROID) 100 MCG tablet 1 tablet (100 mcg total) by Per G Tube route before breakfast. 30 tablet 11    losartan (COZAAR) 100 MG tablet Take 0.5 tablets (50 mg total) by mouth once daily. (Patient not taking: Reported on 11/7/2023) 45 tablet 3    scopolamine (TRANSDERM-SCOP) 1.3-1.5 mg (1 mg over 3 days)  Place 1 patch onto the skin every 72 hours. 24 patch 3    sodium chloride 1,000 mg TbSO oral tablet Take 1 tablet (1,000 mg total) by mouth 2 (two) times a day. Please crush the tablets for the PEG tube feeding or see if a capsule form is available. (Patient not taking: Reported on 11/7/2023) 100 tablet 2    traZODone (DESYREL) 50 MG tablet TAKE 1 TABLET BY MOUTH NIGHTLY AS NEEDED FOR INSOMNIA. 90 tablet 1    zolpidem (AMBIEN) 5 MG Tab 1 tablet (5 mg total) by Per G Tube route nightly as needed (difficult with sleep). 30 tablet 0    [DISCONTINUED] aspirin (ECOTRIN) 81 MG EC tablet Take 81 mg by mouth Daily.       No current facility-administered medications on file prior to visit.          TREATMENT HISTORY:  Hx of radiation tx, 1/6/22 salvage laryngectomy.  11/9/22 total glossectomy and pharyngectomy with adjuvant chemoradiation therapy completed 3/9/23        INTERVENTION:  Stoma: Stoma is patent with granuloma at 11 o'clock within stoma that is small and pedunculated. Granulation tissue is bleeding, fresh blood. It appears that LaryButton stops on insertion superior to this tissue and may be contributing to button not staying in stoma and to irritation of the granuloma and bleeding.  disuccusion with patient regarding option of not using LaryButton or LaryTube to allow tissue to shrink or heal, vs cauterization of tissue and leaving out button or tube while healing. Dr James in agreement with cauterization.          Swallow and trismus: pt has minimal oral opening, can slide tip of one finger between teeth. He was trained in some passive stretch exercises to employ 7x per day, 7 reps, holding 7 seconds. He was able to demonstrate and understood well.      Pulmonary rehab:  Wearing Larytube or adhesive with HME, today leaves with stoma adhesive and HME     AL training:  Did not have EL with him today      Swallowing:  Uses PEG for sole nutrition     Supplies: will reconsider button after healing of granulation  tissue      GERD management:  Encouraged remaining upright for at least 2 hours following tube feeds.         At the end of the session, Cyrus Batres Jr. was wearing  1. Stoma adhesive and HME        IMPRESSIONS:  1.  Mr Batres is s/p 1/6/22 salvage laryngectomy  2.  History of  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    PEG (percutaneous endoscopic gastrostomy) adjustment/replacement/removal 11/22/2022    Postoperative 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    Tongue cancer     Tracheostomy dependence     Type 2 diabetes mellitus, without long-term current use of insulin 11/22/2022     Past Surgical History:   Procedure Laterality Date    CATARACT EXTRACTION W/  INTRAOCULAR LENS IMPLANT Right 8/16/2023    Procedure: CEIOL OD  NPO-peg;  Surgeon: Stoney Munoz MD;  Location: [facility] OR;  Service: Ophthalmology;  Laterality: Right;    CATARACT EXTRACTION W/  INTRAOCULAR LENS IMPLANT Left 10/18/2023    Procedure: CEIOL OS npo peg;  Surgeon: Stoney Munoz MD;  Location: [facility] OR;  Service: Ophthalmology;  Laterality: Left;    DIRECT LARYNGOBRONCHOSCOPY N/A 12/27/2021    Procedure: LARYNGOSCOPY, DIRECT, WITH BRONCHOSCOPY;  Surgeon: Flex Espinosa MD;  Location: [facility] OR [address] [place] [address];  Service: ENT;  Laterality: N/A;    DIRECT LARYNGOSCOPY  11/9/2022    Procedure: LARYNGOSCOPY, DIRECT;  Surgeon: Jeses James MD;  [facility];  Service: ENT;;    DISSECTION OF NECK Bilateral 1/6/2022    Procedure: DISSECTION, NECK;  Surgeon: Jesse James MD;  Location: [facility] OR [address] [place] [address];   Service: ENT;  Laterality: Bilateral;    DISSECTION OF NECK Bilateral 11/9/2022    Procedure: DISSECTION, NECK;  Surgeon: Jesse James MD;  Location: [facility];  Service: ENT;  Laterality: Bilateral;    FLAP PROCEDURE Right 1/6/2022    Procedure: CREATION, FREE FLAP;  Surgeon: Alise Hart MD;  [facility];  Service: ENT;  Laterality: Right;  Ischemic start 1351  Ischemic stop 1502    FLAP PROCEDURE Left 11/9/2022    Procedure: CREATION, FREE FLAP, ALT;  Surgeon: Alise Hart MD;  Location: [facility] OR [facility];  Service: ENT;  Laterality: Left;    GLOSSECTOMY Bilateral 11/9/2022    Procedure: TOTAL GLOSSECTOMY;  Surgeon: Jesse James MD;  [facility];  Service: ENT;  Laterality: Bilateral;    INSERTION OF TUNNELED CENTRAL VENOUS CATHETER (CVC) WITH SUBCUTANEOUS PORT N/A 6/9/2022    Procedure: [id number];  Surgeon: Jesus Viera MD;  Location: [facility];  Service: General;  Laterality: N/A;    INSERTION OF TUNNELED CENTRAL VENOUS CATHETER (CVC) WITH SUBCUTANEOUS PORT Right 1/12/2023    Procedure: [id number];  Surgeon: Abdulaziz Le Jr., MD;  Location: [facility];  Service: General;  Laterality: Right;    LARYNGECTOMY N/A 1/6/2022    Procedure: LARYNGECTOMY;  Surgeon: Jesse James MD;  Location: [facility] OR [address] [place] [address];  Service: ENT;  Laterality: N/A;    LARYNGOSCOPY N/A 8/4/2020    Procedure: Suspension microlaryngoscopy with biopsy, possible KTP laser treatment/excision;  Surgeon: Stew Noel MD;  [facility];  Service: ENT;  Laterality: N/A;  Microscope, telescopes, tower, microinstruments, KTP laser, rep conf# [id number] IC 7/28.    LARYNGOSCOPY N/A 3/16/2021    Procedure: Suspension microlaryngoscopy with excision of lesion, possible CO2 laser;  Surgeon: Stew Noel MD;  [facility];  Service: ENT;  Laterality: N/A;  Microscope, telescopes, tower, microinstruments, CO2 laser, rep conf# [id number]  IC 3/4.    LARYNGOSCOPY N/A 4/1/2021    Procedure: Suspension microlaryngoscopy with KTP laser excision of lesion;  Surgeon: Stew Noel MD;  [facility];  Service: ENT;  Laterality: N/A;  Microscope, telescopes, tower, microinstruments, 70 degree scope, vocal fold , KTP laser, rep conf# [id number] BC    LARYNGOSCOPY N/A 12/9/2021    Procedure: Suspension microlaryngoscopy with biopsy;  Surgeon: Stew Noel MD;  [facility];  Service: ENT;  Laterality: N/A;  Microscope, telescopes, tower, microinstruments    LARYNGOSCOPY N/A 1/6/2022    Procedure: LARYNGOSCOPY;  Surgeon: Jesse James MD;  [facility];  Service: ENT;  Laterality: N/A;    LARYNGOSCOPY N/A 4/27/2022    Procedure: LARYNGOSCOPY WITH BIOPSY;  Surgeon: Jesse James MD;  [facility];  Service: ENT;  Laterality: N/A;    MICROLARYNGOSCOPY N/A 3/17/2020    Procedure: MICROLARYNGOSCOPY;  Surgeon: Jung Xiao MD;  Location: [facility] OR;  Service: ENT;  Laterality: N/A;  Laser Microlaryngoscopy  NEED TO SCHEDULE LASER from [facility] [phone]    PHARYNGECTOMY  11/9/2022    Procedure: TOTAL PHARYNGECTOMY;  Surgeon: Jesse James MD;  Location: [facility] OR [address] [place] [address];  Service: ENT;;    REIMPLANTATION OF PARATHYROID TISSUE N/A 1/6/2022    Procedure: REIMPLANTATION, PARATHYROID TISSUE;  Surgeon: Jesse James MD;  [facility];  Service: ENT;  Laterality: N/A;    SKIN SPLIT GRAFT Right 11/9/2022    Procedure: APPLICATION, GRAFT, SKIN, SPLIT-THICKNESS;  Surgeon: Jesse James MD;  [facility];  Service: ENT;  Laterality: Right;    THYROIDECTOMY  1/6/2022    Procedure: THYROIDECTOMY;  Surgeon: Jesse James MD;  Location: [facility] OR [address] [place] [address];  Service: ENT;;    TRACHEOSTOMY N/A 12/27/2021    Procedure: CREATION, TRACHEOSTOMY;  Surgeon: Flex Espinosa MD;  Location: [facility] OR [address] [place] [address];  Service: ENT;  Laterality: N/A;              RECOMMENDATIONS/PLAN OF CARE:    1.  Continued use of writing or text to talk brittany.  2.  D/c use of larybutton or Larytube while granuloma is healing, then consider attempting LaryButton once healed. Would recommend longer LaryButton to bypass site of granuloma, in effort not to irritate tissue and possibly reform granuloma.  3.  Continue use of HMEs in stoma adhesive 24/7 daily. Continue use of saline mist in stoma several times per day as well as cleaning stoma  4. Continued ST for trismus and swallow as needed  5. Follow up with  as directed.     Pt is not interested in swallow therapy at present, is content with use of PEG. Suspect he may have upper esophageal stricture, as he cannot clear saliva nor swallows of liquids. He also no longer has reflux when bending over. Trismus is also severe.      Long-term goals:  Cyrus Batres Jr.  will   1.  Be independent in communication device use.  2.  Be independent in care of stoma.        Short-term objectives:  Cyrus Batres Jr. will  1.  Twice daily cleaning of stoma.  2.  Continue use of HMEs and stoma adhesives 24/7 daily.

## 2024-01-18 NOTE — PROGRESS NOTES
Cyrus Batres Jr. for Botox injection into bilateral parotid glands for assistance with excessive saliva.  No complaints.      Procedure:  Bilateral parotid sites prepped with alcohol.  25 units of Botox injected into both parotid glands.  Granulation tissue was cauterized at the stoma. He tolerated the procedure well.    Return as scheduled for surveillance.

## 2024-01-22 ENCOUNTER — PATIENT MESSAGE (OUTPATIENT)
Dept: FAMILY MEDICINE | Facility: CLINIC | Age: 74
End: 2024-01-22
Payer: MEDICARE

## 2024-01-22 ENCOUNTER — CLINICAL SUPPORT (OUTPATIENT)
Dept: REHABILITATION | Facility: HOSPITAL | Age: 74
End: 2024-01-22
Payer: MEDICARE

## 2024-01-22 ENCOUNTER — TELEPHONE (OUTPATIENT)
Dept: FAMILY MEDICINE | Facility: CLINIC | Age: 74
End: 2024-01-22
Payer: MEDICARE

## 2024-01-22 DIAGNOSIS — I89.0 LYMPHEDEMA DUE TO RADIATION: Primary | ICD-10-CM

## 2024-01-22 DIAGNOSIS — L90.5 SCAR CONDITIONS AND FIBROSIS OF SKIN: ICD-10-CM

## 2024-01-22 DIAGNOSIS — R29.898 DECREASED ROM OF NECK: ICD-10-CM

## 2024-01-22 PROCEDURE — 97110 THERAPEUTIC EXERCISES: CPT | Mod: PO

## 2024-01-22 PROCEDURE — 97140 MANUAL THERAPY 1/> REGIONS: CPT | Mod: PO

## 2024-01-22 NOTE — TELEPHONE ENCOUNTER
----- Message from Maico Patterson MD sent at 1/21/2024  2:32 PM CST -----  Regarding: Patient's blood sugars elevated  Check with patient as to how his blood sugars are doing and if he needs early follow-up.  Dr. Tamez had forwarded me 1 of his lab which had shown high blood sugars.  ----- Message -----  From: Venu Tamez MD  Sent: 12/18/2023  12:15 PM CST  To: Maico Patterson MD    His glucose is elevated [facility]

## 2024-01-23 NOTE — PROGRESS NOTES
OccupationalTherapy Daily Treatment Note     Name: Cyrus Batres Jr.  Clinic Number: [id number]    Therapy Diagnosis:   Encounter Diagnoses   Name Primary?    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  Functional change: none    Pain: 0/10  Location: neck     Objective     Response to previous treatment: The area below the chin is much softer and the area has visibly reduced below the chin.  The scar is more mobile.  There was more wrinkling of the skin noted. The jaw line is more palpable and the face is more narrow.  Treatment:   Nicolás received the following manual therapy techniques:- Manual Lymphatic Drainage and Soft tissue Mobilization were performed to the: face and neck for 60 minutes, including: Manual Lymph Drainage and short stretch compression bandaging     MANUAL LYMPHATIC DRAINAGE:  Drainage of the Right and Left upper quadrant from the axilla extending up to the neck, over the ear and to the side of the face to the jaw line.  While supine with head elevate,  Drainage provided from the front of the neck, B subclavian regions,  Across ant chest and top of shoulder,  drainage of the entire face with return to the axillas.      Kinesio tape was applied to the neck along the scar in an I strip  application with 50% tension from left to right to increase lymph uptake and direct lymph flow.     Nicolás received therapeutic exercises to develop cardiac output for 8 minutes including:   THERAPEUTIC EXERCISE- Patient performed Nustep therex for 8 min set at 3 resistance to increase cardiac output and increase lymph circulation.   THERAPEUTIC EXERCISES:  Continue Home Exercise Program of Active Range Of Motion, stretching, and postural correction.     Home Exercises Provided and Patient Education Provided     Education provided:      PATIENT/FAMILY Education: garment wearing schedule,  Home Exercise Program,  Beginning of self massage,  Self or assisted bandaging , and Risk reduction    KINESIO TAPE: Nicolás was educated on kinesio tape wearing schedule and to remove if having ill effects.  Nicolás verbalized understanding of the above education.      Written Home Exercises Provided: Nicolás demonstrated good  understanding of the education provided.       Assessment     He arrived to the session and the area below the chin was not as pronounced as it was on the evaluation.  He is using a chip bag to address the fibrotic fluid below the chin.  He tolerated Manual lymph drainage with out an issue.  He brought in the silicone gel pads that were recommended to be placed along the scar on the neck which is keeping fluid below his chin trapped but they are not staying in place due to the moist air coming out of the trach.  It was recommended that he back them away from the trach.  Kinesio tape is being used as a manual technique to increase lymph flow, direct lymph uptake and improve scar mobility.  The area below the chin was softer at the end of the session.    Nicolás Is progressing well towards his goals.   Patient  prognosis is Good.     Patient will continue to benefit from skilled outpatient occupational therapy to address the deficits listed in the problem list box on initial evaluation, provide pt/family  education and to maximize pt's level of independence in the home and community environment.     Patient 's spiritual, cultural and educational needs considered and patient agreeable to plan of care and goals.     Anticipated barriers to occupational therapy: non verbal    Goals    Short Term Goals: (4 weeks)  In Progress  Patient to be Independent and compliant with Home Exercise Program.  In Progress  Decrease girth in the face and neck by 6 cm for improved neck mobility.  In Progress  Patient will demonstrate 100% knowledge of lymphedema precautions and signs of infection.   In Progress  Patient will perform self-scar mobility techniques.  In Progress  Patient will perform self lymph drainage techniques.  In Progress  Patient will tolerate compression garment wearing schedule.     Long Term Goals: (12 weeks)  In Progress  Decrease girth in face and neck by 10 cm for improved neck mobility.  In Progress  Patient will show reduction in girth to mild or less with improved contour of face and neck.  In Progress  Patient to malinda/doff compression garment with daily compliance.       Plan      Patient to be seen 1-2 x per week for 90 days for the medically necessary treatments to include: decongestive massage- Manual lymphatic drainage, kinesio tape, scar massage, education in lymphedema precautions, self lymph drainage massage, and assistance in obtaining appropriate compression garment. Therapeutic exercise, postural correction, and progression of Home Exercise Program.       Thania Plata, OT, CLT

## 2024-01-24 ENCOUNTER — PATIENT MESSAGE (OUTPATIENT)
Dept: FAMILY MEDICINE | Facility: CLINIC | Age: 74
End: 2024-01-24
Payer: MEDICARE

## 2024-01-24 RX ORDER — LEVOTHYROXINE SODIUM 100 UG/1
100 TABLET ORAL
Qty: 30 TABLET | Refills: 11 | Status: SHIPPED | OUTPATIENT
Start: 2024-01-24 | End: 2025-01-23

## 2024-01-25 ENCOUNTER — CLINICAL SUPPORT (OUTPATIENT)
Dept: REHABILITATION | Facility: HOSPITAL | Age: 74
End: 2024-01-25
Payer: MEDICARE

## 2024-01-25 DIAGNOSIS — I89.0 LYMPHEDEMA DUE TO RADIATION: Primary | ICD-10-CM

## 2024-01-25 PROCEDURE — 97140 MANUAL THERAPY 1/> REGIONS: CPT | Mod: PO

## 2024-01-25 PROCEDURE — 97110 THERAPEUTIC EXERCISES: CPT | Mod: PO

## 2024-01-25 NOTE — PROGRESS NOTES
OccupationalTherapy Daily Treatment Note     Name: Cyrus Batres Jr.  Clinic Number: [id number]    Therapy Diagnosis:   Encounter Diagnosis   Name Primary?    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  Functional change: none    Pain: 0/10  Location: neck     Objective     Response to previous treatment: The area below the chin is much softer and the area has visibly reduced below the chin.  The scar is more mobile.  There was more wrinkling of the skin noted. The jaw line is more palpable and the face is more narrow.  Treatment:   Nicolás received the following manual therapy techniques:- Manual Lymphatic Drainage and Soft tissue Mobilization were performed to the: face and neck for 60 minutes, including: Manual Lymph Drainage and short stretch compression bandaging     MANUAL LYMPHATIC DRAINAGE:  Drainage of the Right and Left upper quadrant from the axilla extending up to the neck, over the ear and to the side of the face to the jaw line.  While supine with head elevate,  Drainage provided from the front of the neck, B subclavian regions,  Across ant chest and top of shoulder,  drainage of the entire face with return to the axillas.      Kinesio tape was applied to the neck along the scar in an I strip application with 50% tension from left to right to increase lymph uptake  and direct lymph flow.     Nicolás received therapeutic exercises to develop cardiac output for 8 minutes including:   THERAPEUTIC EXERCISE- Patient performed Nustep therex for 8 min set at 3 resistance to increase cardiac output and increase lymph circulation.   THERAPEUTIC EXERCISES:  Continue Home Exercise Program of Active Range Of Motion, stretching, and postural correction.     Home Exercises Provided and Patient Education Provided     Education provided:      PATIENT/FAMILY Education: garment wearing schedule,  Home Exercise Program,  Beginning of self massage,  Self or assisted bandaging , and Risk reduction    KINESIO TAPE: Nicolás was educated on kinesio tape wearing schedule and to remove if having ill effects.  Nicolás verbalized understanding of the above education.      Written Home Exercises Provided: Nicolás demonstrated good  understanding of the education provided.         Girth Measurements (in centimeters)  LANDMARK Head/Neck Measurement Face  Right Face Left    Diagonal Head Circumference 64.5       Vertical Submental Circumference 61.8       Mandibular Angle to Mandibular Angle 22       Tragus Angle to Tragus Angle 31.8       Neck Superior 34.8       Neck Middle 31.7       Neck Inferior  31.5       Tragus to Mental Protuberance  15.5 15.5     Tragus to Mouth Angle  10.7 11     Mandibular Angle to Nasal Wing  10.3 10.7     Mandibular Angle to Internal Eye Corner  13 13     Mandibular Angle to External Eye Corner  10 9.5     Mental Protuberance to Internal Eye Corner  11 12     Mandibular Angle to Mental Protuberance  10.7 11     Totals         Total girth measurement-   442       Total Girth Reduction   18.1       Lymphencephalopathy: Moderate        Assessment     He arrived to the session and the area below the chin was not as pronounced as it was on the evaluation.  He is using a chip bag to address the fibrotic fluid below the chin.  He tolerated Manual lymph drainage with out an issue.  He  brought in the silicone gel pads that were recommended to be placed along the scar on the neck which is keeping fluid below his chin trapped but they are not staying in place due to the moist air coming out of the trach.  It was recommended that he back them away from the trach.  Kinesio tape is being used as a manual technique to increase lymph flow, direct lymph uptake and improve scar mobility.  The area below the chin was softer at the end of the session.  He has had a significant reduction of swelling in the face and below the chin.    Nicolás Is progressing well towards his goals.   Patient  prognosis is Good.     Patient will continue to benefit from skilled outpatient occupational therapy to address the deficits listed in the problem list box on initial evaluation, provide pt/family education and to maximize pt's level of independence in the home and community environment.     Patient 's spiritual, cultural and educational needs considered and patient agreeable to plan of care and goals.     Anticipated barriers to occupational therapy: non verbal    Goals    Short Term Goals: (4 weeks)  In Progress  Patient to be Independent and compliant with Home Exercise Program.  Met   Decrease girth in the face and neck by 6 cm for improved neck mobility.  Met   Patient will demonstrate 100% knowledge of lymphedema precautions and signs of infection.   In Progress  Patient will perform self-scar mobility techniques.  In Progress  Patient will perform self lymph drainage techniques.  In Progress  Patient will tolerate compression garment wearing schedule.     Long Term Goals: (12 weeks)  Met   Decrease girth in face and neck by 10 cm for improved neck mobility.  In Progress  Patient will show reduction in girth to mild or less with improved contour of face and neck.  In Progress  Patient to malinda/doff compression garment with daily compliance.       Plan      Patient to be seen 1-2 x per week for 90 days for the medically  necessary treatments to include: decongestive massage- Manual lymphatic drainage, kinesio tape, scar massage, education in lymphedema precautions, self lymph drainage massage, and assistance in obtaining appropriate compression garment. Therapeutic exercise, postural correction, and progression of Home Exercise Program.       Thania Plata, OT, CLT

## 2024-01-26 ENCOUNTER — PATIENT MESSAGE (OUTPATIENT)
Dept: OPTOMETRY | Facility: CLINIC | Age: 74
End: 2024-01-26
Payer: MEDICARE

## 2024-01-29 ENCOUNTER — CLINICAL SUPPORT (OUTPATIENT)
Dept: REHABILITATION | Facility: HOSPITAL | Age: 74
End: 2024-01-29
Payer: MEDICARE

## 2024-01-29 DIAGNOSIS — I89.0 LYMPHEDEMA DUE TO RADIATION: Primary | ICD-10-CM

## 2024-01-29 PROCEDURE — 97110 THERAPEUTIC EXERCISES: CPT | Mod: PO

## 2024-01-29 PROCEDURE — 97140 MANUAL THERAPY 1/> REGIONS: CPT | Mod: PO

## 2024-01-29 NOTE — PROGRESS NOTES
OccupationalTherapy Daily Treatment Note     Name: Cyrus Batres Jr.  Clinic Number: [id number]    Therapy Diagnosis:   Encounter Diagnosis   Name Primary?    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  Functional change: none    Pain: 0/10  Location: neck     Objective     Response to previous treatment: The area below the chin is much softer and the area has visibly reduced below the chin.  The scar is more mobile.  There was more wrinkling of the skin noted. The jaw line is more palpable and the face is more narrow.  Treatment:   Nicolás received the following manual therapy techniques:- Manual Lymphatic Drainage and Soft tissue Mobilization were performed to the: face and neck for 60 minutes, including: Manual Lymph Drainage and short stretch compression bandaging     MANUAL LYMPHATIC DRAINAGE:  Drainage of the Right and Left upper quadrant from the axilla extending up to the neck, over the ear and to the side of the face to the jaw line.  While supine with head elevate,  Drainage provided from the front of the neck, B subclavian regions,  Across ant chest and top of shoulder,  drainage of the entire face with return to the axillas.      Kinesio tape was applied to the neck along the scar in an I strip application with 50% tension from left to right to increase lymph  uptake and direct lymph flow.     Nicolás received therapeutic exercises to develop cardiac output for 8 minutes including:   THERAPEUTIC EXERCISE- Patient performed Nustep therex for 8 min set at 3 resistance to increase cardiac output and increase lymph circulation.   THERAPEUTIC EXERCISES:  Continue Home Exercise Program of Active Range Of Motion, stretching, and postural correction.     Home Exercises Provided and Patient Education Provided     Education provided:      PATIENT/FAMILY Education: garment wearing schedule,  Home Exercise Program,  Beginning of self massage,  Self or assisted bandaging , and Risk reduction    KINESIO TAPE: Nicolás was educated on kinesio tape wearing schedule and to remove if having ill effects.  Nicolás verbalized understanding of the above education.      Written Home Exercises Provided: Nicolás demonstrated good  understanding of the education provided.         Girth Measurements (in centimeters)  LANDMARK Head/Neck Measurement Face  Right Face Left    Diagonal Head Circumference 64.5       Vertical Submental Circumference 61.8       Mandibular Angle to Mandibular Angle 22       Tragus Angle to Tragus Angle 31.8       Neck Superior 34.8       Neck Middle 31.7       Neck Inferior  31.5       Tragus to Mental Protuberance  15.5 15.5     Tragus to Mouth Angle  10.7 11     Mandibular Angle to Nasal Wing  10.3 10.7     Mandibular Angle to Internal Eye Corner  13 13     Mandibular Angle to External Eye Corner  10 9.5     Mental Protuberance to Internal Eye Corner  11 12     Mandibular Angle to Mental Protuberance  10.7 11     Totals         Total girth measurement-   442       Total Girth Reduction   18.1       Lymphencephalopathy: Moderate        Assessment     He arrived to the session and the area below the chin was not as pronounced as it was on the evaluation.  He is using a chip bag to address the fibrotic fluid below the chin.  He tolerated Manual lymph drainage with out an issue.   He brought in the silicone gel pads that were recommended to be placed along the scar on the neck which is keeping fluid below his chin trapped but they are not staying in place due to the moist air coming out of the trach.  It was recommended that he back them away from the trach.  Kinesio tape is being used as a manual technique to increase lymph flow, direct lymph uptake and improve scar mobility.  The area below the chin was softer at the end of the session.  He has had a significant reduction of swelling in the face and below the chin.    Nicolás Is progressing well towards his goals.   Patient  prognosis is Good.     Patient will continue to benefit from skilled outpatient occupational therapy to address the deficits listed in the problem list box on initial evaluation, provide pt/family education and to maximize pt's level of independence in the home and community environment.     Patient 's spiritual, cultural and educational needs considered and patient agreeable to plan of care and goals.     Anticipated barriers to occupational therapy: non verbal    Goals    Short Term Goals: (4 weeks)  In Progress  Patient to be Independent and compliant with Home Exercise Program.  Met   Decrease girth in the face and neck by 6 cm for improved neck mobility.  Met   Patient will demonstrate 100% knowledge of lymphedema precautions and signs of infection.   In Progress  Patient will perform self-scar mobility techniques.  In Progress  Patient will perform self lymph drainage techniques.  In Progress  Patient will tolerate compression garment wearing schedule.     Long Term Goals: (12 weeks)  Met   Decrease girth in face and neck by 10 cm for improved neck mobility.  In Progress  Patient will show reduction in girth to mild or less with improved contour of face and neck.  In Progress  Patient to malinda/doff compression garment with daily compliance.       Plan      Patient to be seen 1-2 x per week for 90 days for the  medically necessary treatments to include: decongestive massage- Manual lymphatic drainage, kinesio tape, scar massage, education in lymphedema precautions, self lymph drainage massage, and assistance in obtaining appropriate compression garment. Therapeutic exercise, postural correction, and progression of Home Exercise Program.       Thania Plata, OT, CLT

## 2024-02-02 ENCOUNTER — OFFICE VISIT (OUTPATIENT)
Dept: OPTOMETRY | Facility: CLINIC | Age: 74
End: 2024-02-02
Payer: MEDICARE

## 2024-02-02 DIAGNOSIS — H52.7 REFRACTIVE ERROR: Primary | ICD-10-CM

## 2024-02-02 PROCEDURE — 99499 UNLISTED E&M SERVICE: CPT | Mod: S$GLB,,, | Performed by: OPTOMETRIST

## 2024-02-02 PROCEDURE — 99999 PR PBB SHADOW E&M-EST. PATIENT-LVL III: CPT | Mod: PBBFAC,,, | Performed by: OPTOMETRIST

## 2024-02-02 NOTE — PROGRESS NOTES
HPI    72 YO male presents today for a glasses recheck. Patient states that   things are blurred and doubled at near and when reading the TV captions.   Got glasses here and had Sofia check them.   Last edited by Sangeeta Catalan on 2/2/2024 10:08 AM.            Assessment /Plan     For exam results, see Encounter Report.    Refractive error      Diplopia with new specs  Placed prisms in phoropter with good results  Updated rx to include previous prisms

## 2024-02-04 NOTE — PROGRESS NOTES
Subjective:       Patient ID: Cyrus Batres Jr. is a 73 y.o. male.    Chief Complaint: Hyperlipidemia, Laryngeal cancer, and PEG Tube feeding      73-year-old gentleman who comes for follow-up.  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    Prior to few years back his sugars were elevated.  Currently his sugars are doing okay and he has not on any medications and diagnosis of diabetes will be retired.      His TSH is slightly elevated and he takes levothyroxine at 4:00 a.m. via the PEG tube.    Perhaps Ms. Dena Silveira may increase his levothyroxine next visit which is in April 3rd.    He also takes Pepcid.      He takes scopolamine transdermal patch for keeping his laryngeal areas dry.    He is going through lymphedema therapy also.        His chronic medical conditions include the following:-  1.-APHONIC SECONDARY TO TRACHEOSTOMY STATUS AND COMMUNICATES THROUGH ELECTRONIC CHALK BOARD.  2. FEEDING VIA PEG TUBE WHICH IS STABLE AT THIS POINT.  Gets Jevity feeding  3.-INSOMNIA WITH CURRENT STABLE DEPENDENCY ON AMBIEN 5 MG.  Also trazodone noted in his list.  4.-HISTORY OF DIARRHEA FOR WHICH HE TAKES OPIUM TINCTURE.  5.-RECENT LABS HAVE SHOWN A SOMEWHAT LOW SODIUM.  CAUSE OF LOW SODIUM UNCERTAIN AND NOT SURE IF HE IS GETTING ENOUGH ELECTROLYTES.  6.-diabetes mellitus at least in past but I suspect secondary to weight loss sugar numbers have got better.  Currently he is not noted to be on any medications.  7.-GI protection with esomeprazole and famotidine.  8.-hypocalcemia currently on calcium carbonate supplementation.  Possibly secondary to weight loss and nutritional issues.    Undergoing lymphedema therapy from miss Thania Plata for leg lymphedema.  Manual lymphatic drainage.  Decongestive massage.    11/18/2024-seen by Dr. Jesse hutchison MD ENT Oncology and injected 25 units of Botox it to both the parotid glands.  Granulation tissue was cauterized at the  stoma.    Extensive treatment for laryngeal cancer including initial resection, final salvage surgery and in between he would received radiation treatment.    He is nonvocal and communicates by electronic board.  He has a tracheostomy tube.    Thyroid Problem  Presents for follow-up visit. Patient reports no anxiety, cold intolerance, constipation, diarrhea or palpitations. Hoarse voice: aphonia.(TSH is elevated) The symptoms have been worsening.   Cancer  This is a chronic (Laryngeal cancer) problem. The current episode started more than 1 year ago. The problem occurs constantly. The problem has been gradually improving. Pertinent negatives include no abdominal pain, arthralgias, chills, congestion, coughing, joint swelling, neck pain or vomiting.       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    PEG (percutaneous endoscopic gastrostomy) adjustment/replacement/removal 11/22/2022    Postoperative 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    Tongue cancer     Tracheostomy dependence     Type 2 diabetes mellitus, without long-term current use of insulin 11/22/2022     Social History     Socioeconomic History    Marital status:      Spouse name: Janel Batres    Number of children: 2   Occupational History    Occupation: [employer]     Employer: [employer]   Tobacco Use    Smoking status: Never    Smokeless tobacco: Never   Substance and Sexual Activity    Alcohol use: Not Currently     Comment: occasional    Drug use: No    Sexual activity: Yes     Partners: Female   Social History Narrative    **  Merged History Encounter **         2 children from his 1st wife     Social Determinants of Health     Financial Resource Strain: Low Risk  (2/2/2024)    Overall Financial Resource Strain (CARDIA)     Difficulty of Paying Living Expenses: Not hard at all   Food Insecurity: No Food Insecurity (2/2/2024)    Hunger Vital Sign     Worried About Running Out of Food in the Last Year: Never true     Ran Out of Food in the Last Year: Never true   Transportation Needs: No Transportation Needs (2/2/2024)    PRAPARE - Transportation     Lack of Transportation (Medical): No     Lack of Transportation (Non-Medical): No   Physical Activity: Insufficiently Active (2/2/2024)    Exercise Vital Sign     Days of Exercise per Week: 3 days     Minutes of Exercise per Session: 30 min   Stress: No Stress Concern Present (2/2/2024)    [employer] - Occupational Stress Questionnaire     Feeling of Stress : Not at all   Social Connections: Moderately Integrated (2/2/2024)    Social Connection and Isolation Panel [NHANES]     Frequency of Communication with Friends and Family: More than three times a week     Frequency of Social Gatherings with Friends and Family: Patient declined     Attends Gnosticism Services: Never     Active Member of Clubs or Organizations: Yes     Attends Club or Organization Meetings: Never     Marital Status:    Housing Stability: Low Risk  (2/2/2024)    Housing Stability Vital Sign     Unable to Pay for Housing in the Last Year: No     Number of Places Lived in the Last Year: 1     Unstable Housing in the Last Year: No     Past Surgical History:   Procedure Laterality Date    CATARACT EXTRACTION W/  INTRAOCULAR LENS IMPLANT Right 8/16/2023    Procedure: CEIOL OD  NPO-peg;  Surgeon: Stoney Munoz MD;  Location: [facility];  Service: Ophthalmology;  Laterality: Right;    CATARACT EXTRACTION W/  INTRAOCULAR LENS IMPLANT Left 10/18/2023    Procedure: CEIOL OS npo peg;  Surgeon:  Stoney Munoz MD;  Location: [facility];  Service: Ophthalmology;  Laterality: Left;    DIRECT LARYNGOBRONCHOSCOPY N/A 12/27/2021    Procedure: LARYNGOSCOPY, DIRECT, WITH BRONCHOSCOPY;  Surgeon: Flex Espinosa MD;  [facility];  Service: ENT;  Laterality: N/A;    DIRECT LARYNGOSCOPY  11/9/2022    Procedure: LARYNGOSCOPY, DIRECT;  Surgeon: Jesse James MD;  [facility];  Service: ENT;;    DISSECTION OF NECK Bilateral 1/6/2022    Procedure: DISSECTION, NECK;  Surgeon: Jesse James MD;  [facility];  Service: ENT;  Laterality: Bilateral;    DISSECTION OF NECK Bilateral 11/9/2022    Procedure: DISSECTION, NECK;  Surgeon: Jesse James MD;  [facility];  Service: ENT;  Laterality: Bilateral;    FLAP PROCEDURE Right 1/6/2022    Procedure: CREATION, FREE FLAP;  Surgeon: Alise Hart MD;  [facility];  Service: ENT;  Laterality: Right;  Ischemic start 1351  Ischemic stop 1502    FLAP PROCEDURE Left 11/9/2022    Procedure: CREATION, FREE FLAP, ALT;  Surgeon: Alise Hart MD;  [facility];  Service: ENT;  Laterality: Left;    GLOSSECTOMY Bilateral 11/9/2022    Procedure: TOTAL GLOSSECTOMY;  Surgeon: Jesse James MD;  [facility];  Service: ENT;  Laterality: Bilateral;    INSERTION OF TUNNELED CENTRAL VENOUS CATHETER (CVC) WITH SUBCUTANEOUS PORT N/A 6/9/2022    Procedure: [id number];  Surgeon: Jesus Viera MD;  Location: [facility] OR;  Service: General;  Laterality: N/A;    INSERTION OF TUNNELED CENTRAL VENOUS CATHETER (CVC) WITH SUBCUTANEOUS PORT Right 1/12/2023    Procedure: [id number];  Surgeon: Abdulaziz Le Jr., MD;  Location: [facility] OR;  Service: General;  Laterality: Right;    LARYNGECTOMY N/A 1/6/2022    Procedure: LARYNGECTOMY;  Surgeon: Jesse James MD;  Location: [facility] OR 2ND FLR;  Service: ENT;  Laterality: N/A;    LARYNGOSCOPY N/A 8/4/2020     Procedure: Suspension microlaryngoscopy with biopsy, possible KTP laser treatment/excision;  Surgeon: Stew Noel MD;  [facility];  Service: ENT;  Laterality: N/A;  Microscope, telescopes, tower, microinstruments, KTP laser, rep conf# [id number] IC 7/28.    LARYNGOSCOPY N/A 3/16/2021    Procedure: Suspension microlaryngoscopy with excision of lesion, possible CO2 laser;  Surgeon: Stew Noel MD;  [facility];  Service: ENT;  Laterality: N/A;  Microscope, telescopes, tower, microinstruments, CO2 laser, rep conf# [id number] IC 3/4.    LARYNGOSCOPY N/A 4/1/2021    Procedure: Suspension microlaryngoscopy with KTP laser excision of lesion;  Surgeon: Stew Noel MD;  [facility];  Service: ENT;  Laterality: N/A;  Microscope, telescopes, tower, microinstruments, 70 degree scope, vocal fold , KTP laser, rep conf# [id number] BC    LARYNGOSCOPY N/A 12/9/2021    Procedure: Suspension microlaryngoscopy with biopsy;  Surgeon: Stew Noel MD;  Location: [facility] OR [address] [place] [address];  Service: ENT;  Laterality: N/A;  Microscope, telescopes, tower, microinstruments    LARYNGOSCOPY N/A 1/6/2022    Procedure: LARYNGOSCOPY;  Surgeon: Jesse James MD;  Location: [facility] OR [address] [place] [address];  Service: ENT;  Laterality: N/A;    LARYNGOSCOPY N/A 4/27/2022    Procedure: LARYNGOSCOPY WITH BIOPSY;  Surgeon: Jesse James MD;  Location: [facility] OR [address] [place] [address];  Service: ENT;  Laterality: N/A;    MICROLARYNGOSCOPY N/A 3/17/2020    Procedure: MICROLARYNGOSCOPY;  Surgeon: Jung Xiao MD;  Location: [facility];  Service: ENT;  Laterality: N/A;  Laser Microlaryngoscopy  NEED TO SCHEDULE LASER from [facility] [id number]    PHARYNGECTOMY  11/9/2022    Procedure: TOTAL PHARYNGECTOMY;  Surgeon: Jesse James MD;  Location: [facility] OR 2ND FLR;  Service: ENT;;    REIMPLANTATION OF PARATHYROID TISSUE N/A 1/6/2022    Procedure: REIMPLANTATION, PARATHYROID TISSUE;  Surgeon: Jesse AMOS  MD Erika;  [facility];  Service: ENT;  Laterality: N/A;    SKIN SPLIT GRAFT Right 11/9/2022    Procedure: APPLICATION, GRAFT, SKIN, SPLIT-THICKNESS;  Surgeon: Jesse James MD;  [facility];  Service: ENT;  Laterality: Right;    THYROIDECTOMY  1/6/2022    Procedure: THYROIDECTOMY;  Surgeon: Jesse James MD;  [facility];  Service: ENT;;    TRACHEOSTOMY N/A 12/27/2021    Procedure: CREATION, TRACHEOSTOMY;  Surgeon: Flex Espinosa MD;  [facility];  Service: ENT;  Laterality: N/A;     Family History   Problem Relation Age of Onset    Abnormal EKG Mother     Diabetes Father     Heart disease Father     Hypertension Father        Review of Systems   Constitutional:  Negative for activity change, chills and unexpected weight change.   HENT:  Negative for congestion, hearing loss, rhinorrhea and trouble swallowing. Hoarse voice: aphonia.        Underlying history of head and neck cancer status post laryngectomy, glossectomy has been noted.  Radical surgeries and he is aphonic and has a permanent tracheostomy tube.  He gets intermittent injections in the parotid gland with Botox to dry up the secretions.  He  may swallow little bit of water occasionally.  That is all.   Eyes:  Negative for discharge, redness and visual disturbance.        Since cataract surgery he is eyesight is better.   Respiratory:  Negative for cough, chest tightness and wheezing.         Tracheostomy tube.   Cardiovascular:  Negative for palpitations.   Gastrointestinal:  Negative for abdominal pain, blood in stool, constipation, diarrhea and vomiting.        Peg tube feeding.   Endocrine: Negative for cold intolerance.        Used to have diabetes mellitus but I suspect secondary to weight loss this has dissipated.  Hypothyroidism currently on levothyroxine 100 mcg.   Genitourinary:  Negative for difficulty urinating, hematuria and urgency.   Musculoskeletal:  Negative for  "arthralgias, joint swelling and neck pain.        Complains of stiffness in the right 3rd finger.   Skin:  Negative for color change and wound.   Psychiatric/Behavioral:  Negative for dysphoric mood. The patient is not nervous/anxious.          Objective:      Blood pressure 123/68, pulse 70, height 5' 6" (1.676 m), weight 65.8 kg (145 lb). Body mass index is 23.4 kg/m².  Physical Exam  Constitutional:       General: He is not in acute distress.     Appearance: He is ill-appearing. He is not diaphoretic.      Comments: BMI is 23.40-intermittently frustrated and animated with difficulty communication.   HENT:      Head: Normocephalic.        Comments: Tracheostomy tube noted in the neck.  Fitting well.  No infection.  21.64     Mouth/Throat:        Comments: EXAMINATION OF ORAL CAVITY DIFFICULT BECAUSE OF TIGHT MASSETERS.  TOTAL GLOSSECTOMY STATE.  Eyes:      General: No scleral icterus.  Neck:      Vascular: No carotid bruit.   Cardiovascular:      Rate and Rhythm: Normal rate and regular rhythm.   Pulmonary:      Effort: Pulmonary effort is normal. No respiratory distress.      Breath sounds: Normal breath sounds. No wheezing or rhonchi.   Abdominal:      General: There is no distension.      Palpations: Abdomen is soft. There is no mass.      Tenderness: There is no abdominal tenderness.          Comments: Peg tube noted.   Musculoskeletal:         General: No swelling or tenderness.      Cervical back: No rigidity or tenderness.      Right lower leg: No edema.      Left lower leg: No edema.   Skin:     Coloration: Skin is not jaundiced or pale.      Findings: No erythema or lesion.   Neurological:      Mental Status: He is alert. Mental status is at baseline.   Psychiatric:         Behavior: Behavior normal.           Assessment:       Lab Visit on 01/09/2024   Component Date Value Ref Range Status    Hemoglobin A1C 01/09/2024 6.3 (H)  4.0 - 5.6 % Final    Estimated Avg Glucose 01/09/2024 134 (H)  68 - 131 mg/dL " Final    PTH, Intact 01/09/2024 7.2 (L)  9.0 - 77.0 pg/mL Final    Sodium 01/09/2024 138  136 - 145 mmol/L Final    Potassium 01/09/2024 3.9  3.5 - 5.1 mmol/L Final    Chloride 01/09/2024 102  95 - 110 mmol/L Final    CO2 01/09/2024 24  23 - 29 mmol/L Final    Glucose 01/09/2024 133 (H)  70 - 110 mg/dL Final    BUN 01/09/2024 21  8 - 23 mg/dL Final    Creatinine 01/09/2024 1.3  0.5 - 1.4 mg/dL Final    Calcium 01/09/2024 9.1  8.7 - 10.5 mg/dL Final    Total Protein 01/09/2024 7.3  6.0 - 8.4 g/dL Final    Albumin 01/09/2024 4.1  3.5 - 5.2 g/dL Final    Total Bilirubin 01/09/2024 1.4 (H)  0.1 - 1.0 mg/dL Final    Alkaline Phosphatase 01/09/2024 145 (H)  55 - 135 U/L Final    AST 01/09/2024 31  10 - 40 U/L Final    ALT 01/09/2024 32  10 - 44 U/L Final    eGFR 01/09/2024 58.4 (A)  >60 mL/min/1.73 m^2 Final    Anion Gap 01/09/2024 12  8 - 16 mmol/L Final    Ionized Calcium 01/09/2024 1.23  1.06 - 1.42 mmol/L Final    Free T4 01/09/2024 0.92  0.71 - 1.51 ng/dL Final    TSH 01/09/2024 5.496 (H)  0.400 - 4.000 uIU/mL Final   Lab Visit on 12/18/2023   Component Date Value Ref Range Status    WBC 12/18/2023 5.19  3.90 - 12.70 K/uL Final    RBC 12/18/2023 3.92 (L)  4.60 - 6.20 M/uL Final    Hemoglobin 12/18/2023 13.0 (L)  14.0 - 18.0 g/dL Final    Hematocrit 12/18/2023 37.5 (L)  40.0 - 54.0 % Final    MCV 12/18/2023 96  82 - 98 fL Final    MCH 12/18/2023 33.2 (H)  27.0 - 31.0 pg Final    MCHC 12/18/2023 34.7  32.0 - 36.0 g/dL Final    RDW 12/18/2023 12.6  11.5 - 14.5 % Final    Platelets 12/18/2023 116 (L)  150 - 450 K/uL Final    MPV 12/18/2023 11.8  9.2 - 12.9 fL Final    Immature Granulocytes 12/18/2023 0.6 (H)  0.0 - 0.5 % Final    Gran # (ANC) 12/18/2023 4.0  1.8 - 7.7 K/uL Final    Immature Grans (Abs) 12/18/2023 0.03  0.00 - 0.04 K/uL Final    Lymph # 12/18/2023 0.6 (L)  1.0 - 4.8 K/uL Final    Mono # 12/18/2023 0.3  0.3 - 1.0 K/uL Final    Eos # 12/18/2023 0.3  0.0 - 0.5 K/uL Final    Baso # 12/18/2023 0.02  0.00 -  0.20 K/uL Final    nRBC 12/18/2023 0  0 /100 WBC Final    Gran % 12/18/2023 77.2 (H)  38.0 - 73.0 % Final    Lymph % 12/18/2023 11.0 (L)  18.0 - 48.0 % Final    Mono % 12/18/2023 6.0  4.0 - 15.0 % Final    Eosinophil % 12/18/2023 4.8  0.0 - 8.0 % Final    Basophil % 12/18/2023 0.4  0.0 - 1.9 % Final    Differential Method 12/18/2023 Automated   Final    Sodium 12/18/2023 137  136 - 145 mmol/L Final    Potassium 12/18/2023 3.8  3.5 - 5.1 mmol/L Final    Chloride 12/18/2023 101  95 - 110 mmol/L Final    CO2 12/18/2023 27  23 - 29 mmol/L Final    Glucose 12/18/2023 202 (H)  70 - 110 mg/dL Final    BUN 12/18/2023 28 (H)  8 - 23 mg/dL Final    Creatinine 12/18/2023 1.1  0.5 - 1.4 mg/dL Final    Calcium 12/18/2023 8.4 (L)  8.7 - 10.5 mg/dL Final    Total Protein 12/18/2023 7.1  6.0 - 8.4 g/dL Final    Albumin 12/18/2023 4.4  3.5 - 5.2 g/dL Final    Total Bilirubin 12/18/2023 1.1 (H)  0.1 - 1.0 mg/dL Final    Alkaline Phosphatase 12/18/2023 155 (H)  55 - 135 U/L Final    AST 12/18/2023 34  10 - 40 U/L Final    ALT 12/18/2023 39  10 - 44 U/L Final    eGFR 12/18/2023 >60.0  >60 mL/min/1.73 m^2 Final    Anion Gap 12/18/2023 9  8 - 16 mmol/L Final    Iron 12/18/2023 118  45 - 160 ug/dL Final    Transferrin 12/18/2023 208  200 - 375 mg/dL Final    TIBC 12/18/2023 291  250 - 450 ug/dL Final    Saturated Iron 12/18/2023 41  20 - 50 % Final    Ferritin 12/18/2023 341.5 (H)  20.0 - 300.0 ng/mL Final   Lab Visit on 11/17/2023   Component Date Value Ref Range Status    WBC 11/17/2023 4.89  3.90 - 12.70 K/uL Final    RBC 11/17/2023 3.90 (L)  4.60 - 6.20 M/uL Final    Hemoglobin 11/17/2023 12.5 (L)  14.0 - 18.0 g/dL Final    Hematocrit 11/17/2023 37.9 (L)  40.0 - 54.0 % Final    MCV 11/17/2023 97  82 - 98 fL Final    MCH 11/17/2023 32.1 (H)  27.0 - 31.0 pg Final    MCHC 11/17/2023 33.0  32.0 - 36.0 g/dL Final    RDW 11/17/2023 13.4  11.5 - 14.5 % Final    Platelets 11/17/2023 130 (L)  150 - 450 K/uL Final    MPV 11/17/2023 11.1  9.2 -  12.9 fL Final    Immature Granulocytes 11/17/2023 0.6 (H)  0.0 - 0.5 % Final    Gran # (ANC) 11/17/2023 3.4  1.8 - 7.7 K/uL Final    Immature Grans (Abs) 11/17/2023 0.03  0.00 - 0.04 K/uL Final    Lymph # 11/17/2023 0.8 (L)  1.0 - 4.8 K/uL Final    Mono # 11/17/2023 0.4  0.3 - 1.0 K/uL Final    Eos # 11/17/2023 0.4  0.0 - 0.5 K/uL Final    Baso # 11/17/2023 0.02  0.00 - 0.20 K/uL Final    nRBC 11/17/2023 0  0 /100 WBC Final    Gran % 11/17/2023 68.9  38.0 - 73.0 % Final    Lymph % 11/17/2023 15.3 (L)  18.0 - 48.0 % Final    Mono % 11/17/2023 7.2  4.0 - 15.0 % Final    Eosinophil % 11/17/2023 7.6  0.0 - 8.0 % Final    Basophil % 11/17/2023 0.4  0.0 - 1.9 % Final    Differential Method 11/17/2023 Automated   Final    Sodium 11/17/2023 140  136 - 145 mmol/L Final    Potassium 11/17/2023 4.4  3.5 - 5.1 mmol/L Final    Chloride 11/17/2023 107  95 - 110 mmol/L Final    CO2 11/17/2023 23  23 - 29 mmol/L Final    Glucose 11/17/2023 117 (H)  70 - 110 mg/dL Final    BUN 11/17/2023 25 (H)  8 - 23 mg/dL Final    Creatinine 11/17/2023 1.1  0.5 - 1.4 mg/dL Final    Calcium 11/17/2023 8.5 (L)  8.7 - 10.5 mg/dL Final    Total Protein 11/17/2023 7.2  6.0 - 8.4 g/dL Final    Albumin 11/17/2023 4.4  3.5 - 5.2 g/dL Final    Total Bilirubin 11/17/2023 0.8  0.1 - 1.0 mg/dL Final    Alkaline Phosphatase 11/17/2023 133  55 - 135 U/L Final    AST 11/17/2023 23  10 - 40 U/L Final    ALT 11/17/2023 22  10 - 44 U/L Final    eGFR 11/17/2023 >60.0  >60 mL/min/1.73 m^2 Final    Anion Gap 11/17/2023 10  8 - 16 mmol/L Final    Iron 11/17/2023 127  45 - 160 ug/dL Final    Transferrin 11/17/2023 221  200 - 375 mg/dL Final    TIBC 11/17/2023 309  250 - 450 ug/dL Final    Saturated Iron 11/17/2023 41  20 - 50 % Final    Ferritin 11/17/2023 323.3 (H)  20.0 - 300.0 ng/mL Final       1. Presence of externally removable percutaneous endoscopic gastrostomy (PEG) tube    2. Larynx cancer  Overview:  9/16/20-10/30/20 radiation to larynx and bilateral  necks  1/6/22 TL with bilateral neck dissection and total thyroidectomy      3. Aphonia    4. Leukopenia, unspecified type    5. Reactive depression  Comments:  He feels sad that he can not taste or smell things.  Does not want any medications to lift up his spirits.    6. Arthritis of finger of right hand  -     diclofenac sodium (VOLTAREN ARTHRITIS PAIN) 1 % Gel; Apply 2 g topically once daily. Apply couple of times a day on the affected arthritis pain.  Dispense: 50 g; Refill: 1    7. Thrombocytopenia  -     VITAMIN B12; Future; Expected date: 02/06/2024  -     KHLOE; Future; Expected date: 02/06/2024    8. Elevated blood sugar  -     Hemoglobin A1C; Future; Expected date: 02/06/2024    9. Vitamin B12 deficiency  -     VITAMIN B12; Future; Expected date: 02/06/2024    10. Folate deficiency  -     Folate; Future; Expected date: 02/06/2024             Plan:   Presence of externally removable percutaneous endoscopic gastrostomy (PEG) tube    Larynx cancer    Aphonia    Leukopenia, unspecified type    Reactive depression  Comments:  He feels sad that he can not taste or smell things.  Does not want any medications to lift up his spirits.    Arthritis of finger of right hand  -     diclofenac sodium (VOLTAREN ARTHRITIS PAIN) 1 % Gel; Apply 2 g topically once daily. Apply couple of times a day on the affected arthritis pain.  Dispense: 50 g; Refill: 1    Thrombocytopenia  -     VITAMIN B12; Future; Expected date: 02/06/2024  -     KHLOE; Future; Expected date: 02/06/2024    Elevated blood sugar  -     Hemoglobin A1C; Future; Expected date: 02/06/2024    Vitamin B12 deficiency  -     VITAMIN B12; Future; Expected date: 02/06/2024    Folate deficiency  -     Folate; Future; Expected date: 02/06/2024      Labs have been reviewed.  Mild anemia and also thrombocytopenia not sure if it is any chemo related or his developed any deficiency of B12 or folate.  Will screened for KHLOE also.  He has already been tested negative for  hepatitis-C.    His sugars are doing better.  He has cut down his feeding from 6 cans a day to 5 cans a day.  He is maintaining his weight.  He is allowed to drink only small sips of water and nothing else.  He has no taste no smell.  He is able to drive and has better eyesight at this point.    He is updated on flu vaccine.    RSV vaccine and COVID vaccines are also recommended.  He will get it from the pharmacy.    In future we may have to discuss about colorectal screening if he is interested.  He states that his cancer is cured and his life expectancy is that of normal person.  Will need to start working on other issues including corner artery evaluation and diabetes long-term.      Follow up in about 6 months (around 8/5/2024), or if symptoms worsen or fail to improve.  Patient's medical conditions have been noted.  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    Immunizations have been reviewed.  Recommend to consider getting RSV vaccine and COVID update.    Mostly the feeding and medications are done through the PEG tube.    Spent brittany 30 minutes with patient which involved review of pts medical conditions, labs, medications and with 50% of time face-to-face discussion about medical problems, management and any applicable changes.  Fup 6 months    Current Outpatient Medications:     acetaminophen (TYLENOL) 325 MG tablet, Take 325 mg by mouth every 6 (six) hours as needed for Pain., Disp: , Rfl:     calcitrioL (ROCALTROL) 1 mcg/mL solution, Give 0.25 mLs (0.25 mcg total) by Per G Tube route once daily., Disp: 15 mL, Rfl: 3    calcium carbonate 500 mg/5 mL (1,250 mg/5 mL), Take 20 mls four times daily with meals and nightly., Disp: 2300 mL, Rfl: 12    esomeprazole (NEXIUM) 40 MG capsule, 40 mg before breakfast., Disp: , Rfl:     ibuprofen (ADVIL,MOTRIN) 200 MG tablet, Take 200 mg by mouth every 6 (six) hours as needed for Pain., Disp: , Rfl:     lactose-reduced food with fibr  (JEVITY 1.5 TRISHA) 0.06 gram-1.5 kcal/mL Liqd, 6 cartons per day via peg.  Flush 60ml before and after each bolus, Disp: 180 each, Rfl: 11    levothyroxine (SYNTHROID) 100 MCG tablet, 1 tablet (100 mcg total) by Per G Tube route before breakfast., Disp: 30 tablet, Rfl: 11    scopolamine (TRANSDERM-SCOP) 1.3-1.5 mg (1 mg over 3 days), Place 1 patch onto the skin every 72 hours., Disp: 24 patch, Rfl: 3    diclofenac sodium (VOLTAREN ARTHRITIS PAIN) 1 % Gel, Apply 2 g topically once daily. Apply couple of times a day on the affected arthritis pain., Disp: 50 g, Rfl: 1    famotidine (PEPCID) 40 mg/5 mL (8 mg/mL) suspension, Give 2.5 mLs (20 mg total) by Per G Tube route 2 (two) times daily., Disp: 50 mL, Rfl: 11    Maico Patterson

## 2024-02-05 ENCOUNTER — OFFICE VISIT (OUTPATIENT)
Dept: FAMILY MEDICINE | Facility: CLINIC | Age: 74
End: 2024-02-05
Payer: MEDICARE

## 2024-02-05 ENCOUNTER — CLINICAL SUPPORT (OUTPATIENT)
Dept: REHABILITATION | Facility: HOSPITAL | Age: 74
End: 2024-02-05
Payer: MEDICARE

## 2024-02-05 VITALS
BODY MASS INDEX: 23.3 KG/M2 | HEIGHT: 66 IN | DIASTOLIC BLOOD PRESSURE: 68 MMHG | WEIGHT: 145 LBS | SYSTOLIC BLOOD PRESSURE: 123 MMHG | HEART RATE: 70 BPM

## 2024-02-05 DIAGNOSIS — E53.8 FOLATE DEFICIENCY: ICD-10-CM

## 2024-02-05 DIAGNOSIS — R73.9 ELEVATED BLOOD SUGAR: ICD-10-CM

## 2024-02-05 DIAGNOSIS — D72.819 LEUKOPENIA, UNSPECIFIED TYPE: ICD-10-CM

## 2024-02-05 DIAGNOSIS — E53.8 VITAMIN B12 DEFICIENCY: ICD-10-CM

## 2024-02-05 DIAGNOSIS — F32.9 REACTIVE DEPRESSION: ICD-10-CM

## 2024-02-05 DIAGNOSIS — R49.1 APHONIA: ICD-10-CM

## 2024-02-05 DIAGNOSIS — I89.0 LYMPHEDEMA DUE TO RADIATION: Primary | ICD-10-CM

## 2024-02-05 DIAGNOSIS — Z93.1 PRESENCE OF EXTERNALLY REMOVABLE PERCUTANEOUS ENDOSCOPIC GASTROSTOMY (PEG) TUBE: Primary | ICD-10-CM

## 2024-02-05 DIAGNOSIS — C32.9 LARYNX CANCER: ICD-10-CM

## 2024-02-05 DIAGNOSIS — D69.6 THROMBOCYTOPENIA: ICD-10-CM

## 2024-02-05 DIAGNOSIS — M19.041 ARTHRITIS OF FINGER OF RIGHT HAND: ICD-10-CM

## 2024-02-05 PROCEDURE — 97110 THERAPEUTIC EXERCISES: CPT | Mod: PO

## 2024-02-05 PROCEDURE — 97140 MANUAL THERAPY 1/> REGIONS: CPT | Mod: PO

## 2024-02-05 PROCEDURE — 3078F DIAST BP <80 MM HG: CPT | Mod: CPTII,S$GLB,, | Performed by: INTERNAL MEDICINE

## 2024-02-05 PROCEDURE — 3074F SYST BP LT 130 MM HG: CPT | Mod: CPTII,S$GLB,, | Performed by: INTERNAL MEDICINE

## 2024-02-05 PROCEDURE — 3044F HG A1C LEVEL LT 7.0%: CPT | Mod: CPTII,S$GLB,, | Performed by: INTERNAL MEDICINE

## 2024-02-05 PROCEDURE — 3288F FALL RISK ASSESSMENT DOCD: CPT | Mod: CPTII,S$GLB,, | Performed by: INTERNAL MEDICINE

## 2024-02-05 PROCEDURE — 3008F BODY MASS INDEX DOCD: CPT | Mod: CPTII,S$GLB,, | Performed by: INTERNAL MEDICINE

## 2024-02-05 PROCEDURE — 1157F ADVNC CARE PLAN IN RCRD: CPT | Mod: CPTII,S$GLB,, | Performed by: INTERNAL MEDICINE

## 2024-02-05 PROCEDURE — 99999 PR PBB SHADOW E&M-EST. PATIENT-LVL III: CPT | Mod: PBBFAC,,, | Performed by: INTERNAL MEDICINE

## 2024-02-05 PROCEDURE — 3072F LOW RISK FOR RETINOPATHY: CPT | Mod: CPTII,S$GLB,, | Performed by: INTERNAL MEDICINE

## 2024-02-05 PROCEDURE — 1101F PT FALLS ASSESS-DOCD LE1/YR: CPT | Mod: CPTII,S$GLB,, | Performed by: INTERNAL MEDICINE

## 2024-02-05 PROCEDURE — 99214 OFFICE O/P EST MOD 30 MIN: CPT | Mod: S$GLB,,, | Performed by: INTERNAL MEDICINE

## 2024-02-05 PROCEDURE — 1159F MED LIST DOCD IN RCRD: CPT | Mod: CPTII,S$GLB,, | Performed by: INTERNAL MEDICINE

## 2024-02-05 PROCEDURE — 1126F AMNT PAIN NOTED NONE PRSNT: CPT | Mod: CPTII,S$GLB,, | Performed by: INTERNAL MEDICINE

## 2024-02-05 PROCEDURE — 1160F RVW MEDS BY RX/DR IN RCRD: CPT | Mod: CPTII,S$GLB,, | Performed by: INTERNAL MEDICINE

## 2024-02-05 RX ORDER — DICLOFENAC SODIUM 10 MG/G
2 GEL TOPICAL DAILY
Qty: 100 G | Refills: 1 | Status: SHIPPED | OUTPATIENT
Start: 2024-02-05

## 2024-02-05 NOTE — PROGRESS NOTES
OccupationalTherapy Daily Treatment Note     Name: Cyrus Batres Jr.  Clinic Number: [id number]    Therapy Diagnosis:   Encounter Diagnosis   Name Primary?    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  Functional change: none    Pain: 0/10  Location: neck     Objective     Response to previous treatment: The area below the chin is much softer and the area has visibly reduced below the chin.  The scar is more mobile.  There was more wrinkling of the skin noted. The jaw line is more palpable and the face is more narrow.  Treatment:   Nicolás received the following manual therapy techniques:- Manual Lymphatic Drainage and Soft tissue Mobilization were performed to the: face and neck for 60 minutes, including: Manual Lymph Drainage and short stretch compression bandaging     MANUAL LYMPHATIC DRAINAGE:  Drainage of the Right and Left upper quadrant from the axilla extending up to the neck, over the ear and to the side of the face to the jaw line.  While supine with head elevate,  Drainage provided from the front of the neck, B subclavian regions,  Across ant chest and top of shoulder,  drainage of the entire face with return to the axillas.      Kinesio tape was applied to the neck along the scar in an I strip application with 50% tension from left to right to increase lymph  uptake and direct lymph flow.     Nicolás received therapeutic exercises to develop cardiac output for 8 minutes including:   THERAPEUTIC EXERCISE- Patient performed Nustep therex for 8 min set at 3 resistance to increase cardiac output and increase lymph circulation.   THERAPEUTIC EXERCISES:  Continue Home Exercise Program of Active Range Of Motion, stretching, and postural correction.     Home Exercises Provided and Patient Education Provided     Education provided:      PATIENT/FAMILY Education: garment wearing schedule,  Home Exercise Program,  Beginning of self massage,  Self or assisted bandaging , and Risk reduction    KINESIO TAPE: Nicolás was educated on kinesio tape wearing schedule and to remove if having ill effects.  Nicolás verbalized understanding of the above education.      Written Home Exercises Provided: Nicolás demonstrated good  understanding of the education provided.        Assessment     He arrived to the session and the area below the chin was not as pronounced as it was on the evaluation.  He is using a chip bag to address the fibrotic fluid below the chin.  He tolerated Manual lymph drainage with out an issue.  He brought in the silicone gel pads that were recommended to be placed along the scar on the neck which is keeping fluid below his chin trapped but they are not staying in place due to the moist air coming out of the trach.  It was recommended that he back them away from the trach.  Kinesio tape is being used as a manual technique to increase lymph flow, direct lymph uptake and improve scar mobility.  The area below the chin was softer at the end of the session.  He has had a significant reduction of swelling in the face and below the chin.    Nicolás Is progressing well towards his goals.   Patient  prognosis is Good.     Patient will continue to benefit from skilled outpatient occupational therapy to address the deficits listed in the problem list box on initial evaluation, provide  pt/family education and to maximize pt's level of independence in the home and community environment.     Patient 's spiritual, cultural and educational needs considered and patient agreeable to plan of care and goals.     Anticipated barriers to occupational therapy: non verbal    Goals    Short Term Goals: (4 weeks)  In Progress  Patient to be Independent and compliant with Home Exercise Program.  Met   Decrease girth in the face and neck by 6 cm for improved neck mobility.  Met   Patient will demonstrate 100% knowledge of lymphedema precautions and signs of infection.   In Progress  Patient will perform self-scar mobility techniques.  In Progress  Patient will perform self lymph drainage techniques.  In Progress  Patient will tolerate compression garment wearing schedule.     Long Term Goals: (12 weeks)  Met   Decrease girth in face and neck by 10 cm for improved neck mobility.  In Progress  Patient will show reduction in girth to mild or less with improved contour of face and neck.  In Progress  Patient to malinda/doff compression garment with daily compliance.       Plan      Patient to be seen 1-2 x per week for 90 days for the medically necessary treatments to include: decongestive massage- Manual lymphatic drainage, kinesio tape, scar massage, education in lymphedema precautions, self lymph drainage massage, and assistance in obtaining appropriate compression garment. Therapeutic exercise, postural correction, and progression of Home Exercise Program.       Thania Plata, OT, CLT

## 2024-02-05 NOTE — Clinical Note
Anabel Lea:-I am checking a KHLOE, B12, folic acid on this gentleman who has a low platelet count.  He would like to be fixed.He does have a follow-up with you in couple of weeks' time.  Dr. Yesenia DONOVAN.

## 2024-02-06 ENCOUNTER — OFFICE VISIT (OUTPATIENT)
Dept: SURGICAL ONCOLOGY | Facility: CLINIC | Age: 74
End: 2024-02-06
Payer: MEDICARE

## 2024-02-06 VITALS — BODY MASS INDEX: 23.46 KG/M2 | WEIGHT: 145.94 LBS | HEIGHT: 66 IN

## 2024-02-06 DIAGNOSIS — C01 MALIGNANT NEOPLASM OF BASE OF TONGUE: Primary | ICD-10-CM

## 2024-02-06 PROCEDURE — 3288F FALL RISK ASSESSMENT DOCD: CPT | Mod: CPTII,S$GLB,, | Performed by: OTOLARYNGOLOGY

## 2024-02-06 PROCEDURE — 1157F ADVNC CARE PLAN IN RCRD: CPT | Mod: CPTII,S$GLB,, | Performed by: OTOLARYNGOLOGY

## 2024-02-06 PROCEDURE — 1160F RVW MEDS BY RX/DR IN RCRD: CPT | Mod: CPTII,S$GLB,, | Performed by: OTOLARYNGOLOGY

## 2024-02-06 PROCEDURE — 99999 PR PBB SHADOW E&M-EST. PATIENT-LVL III: CPT | Mod: PBBFAC,,, | Performed by: OTOLARYNGOLOGY

## 2024-02-06 PROCEDURE — 3072F LOW RISK FOR RETINOPATHY: CPT | Mod: CPTII,S$GLB,, | Performed by: OTOLARYNGOLOGY

## 2024-02-06 PROCEDURE — 1159F MED LIST DOCD IN RCRD: CPT | Mod: CPTII,S$GLB,, | Performed by: OTOLARYNGOLOGY

## 2024-02-06 PROCEDURE — 99213 OFFICE O/P EST LOW 20 MIN: CPT | Mod: S$GLB,,, | Performed by: OTOLARYNGOLOGY

## 2024-02-06 PROCEDURE — 3044F HG A1C LEVEL LT 7.0%: CPT | Mod: CPTII,S$GLB,, | Performed by: OTOLARYNGOLOGY

## 2024-02-06 PROCEDURE — 3008F BODY MASS INDEX DOCD: CPT | Mod: CPTII,S$GLB,, | Performed by: OTOLARYNGOLOGY

## 2024-02-06 PROCEDURE — 1101F PT FALLS ASSESS-DOCD LE1/YR: CPT | Mod: CPTII,S$GLB,, | Performed by: OTOLARYNGOLOGY

## 2024-02-06 PROCEDURE — 1126F AMNT PAIN NOTED NONE PRSNT: CPT | Mod: CPTII,S$GLB,, | Performed by: OTOLARYNGOLOGY

## 2024-02-06 NOTE — PROGRESS NOTES
Chief Complaint   Patient presents with    Follow-up     Follow up Larynx cancer         Oncology History   Larynx cancer   8/4/2020 Initial Diagnosis    Larynx neoplasm malignant     8/6/2020 Tumor Conference    His case was discussed at the Multidisciplinary Head and Neck Team Planning Meeting.    Representatives from Medical Oncology, Radiation Oncology, Head and Neck Surgical Oncology, Psychosocial Oncology, and Speech and Language Pathology discussed the case with the following recommendations:    1) definitive radiation              8/6/2020 Cancer Staged    Staging form: Larynx - Glottis, AJCC 8th Edition  - Clinical stage from 8/6/2020: Stage II (cT2, cN0, cM0)     8/6/2020 Cancer Staged    Cancer Staging  Larynx neoplasm malignant  Staging form: Larynx - Glottis, AJCC 8th Edition  - Clinical stage from 8/6/2020: Stage II (cT2, cN0, cM0) - Signed by Fariha Muñoz NP on 8/6/2020 12/16/2021 Tumor Conference    His case was discussed at the Multidisciplinary Head and Neck Team Planning Meeting.    Representatives from Medical Oncology, Radiation Oncology, Head and Neck Surgical Oncology, Psychosocial Oncology, and Speech and Language Pathology discussed the case with the following recommendations:    1) TL  2) oncology referral  3) psychology referral            12/27/2021 Surgery    1. DL And tracheostomy  2. PEG     1/6/2022 Surgery    1. Diagnostic direct laryngoscopy  2. Bilateral modified neck dissection of levels 2 through 4  3. Total laryngectomy  4. Total thyroidectomy with bilateral paratracheal/upper mediastinal neck dissection  5. Autotransplantation of left inferior parathyroid into left sternocleidomastoid muscle   6. Left anterolateral thigh free flap for closure of total laryngectomy neck skin defect  7. Advancement flap for closure of left thigh donor site advanced area was 25 x 10 cm     1/20/2022 Cancer Staged    Staging form: Larynx - Glottis, AJCC 8th Edition  - Pathologic stage  from 1/20/2022: Stage LOU (pT4a, pN0, cM0)     5/30/2022 Cancer Staged    Staging form: Pharynx - P16 Negative Oropharynx, AJCC 8th Edition  - Clinical: Stage II (rcT2, cN0, cM0)     1/23/2023 -  Chemotherapy    Treatment Summary   Plan Name: OP HEAD NECK CISPLATIN WEEKLY + RADIOTHERAPY  Treatment Goal: Curative  Status: Active  Start Date: 1/23/2023  End Date: 3/6/2023  Provider: Venu Tamez MD  Chemotherapy: CISplatin (Platinol) 40 mg/m2 = 66 mg in sodium chloride 0.9% 631 mL chemo infusion, 40 mg/m2 = 66 mg, Intravenous, Clinic/HOD 1 time, 7 of 7 cycles  Administration: 66 mg (1/23/2023), 66 mg (2/13/2023), 66 mg (2/6/2023), 66 mg (2/20/2023), 66 mg (2/27/2023), 66 mg (3/6/2023)     Malignant neoplasm of base of tongue   5/20/2022 Cancer Staged    Staging form: Pharynx - P16 Negative Oropharynx, AJCC 8th Edition  - Clinical stage from 5/20/2022: Stage II (cT2, cN0, cM0, p16-)     6/22/2022 Initial Diagnosis    Primary cancer of base of tongue     7/11/2022 - 8/26/2022 Chemotherapy    Treatment Summary   Plan Name: OP HEAD NECK PEMBROLIZUMAB CARBOPLATIN FLUOROURACIL Q3W FOLLOWED BY MAINTENANCE PEMBROLIZUMAB 400 MG Q6W  Treatment Goal: Palliative  Status: Inactive  Start Date: 7/11/2022  End Date: 8/26/2022  Provider: Aurash Khoobehi, MD  Chemotherapy: CARBOplatin (PARAPLATIN) 440 mg in sodium chloride 0.9% 544 mL chemo infusion, 440 mg (100 % of original dose 438.5 mg), Intravenous, Clinic/HOD 1 time, 3 of 6 cycles  Dose modification:   (original dose 438.5 mg, Cycle 1)  Administration: 440 mg (7/11/2022), 435 mg (8/1/2022), 510 mg (8/22/2022)     1/23/2023 -  Chemotherapy    Treatment Summary   Plan Name: OP HEAD NECK CISPLATIN WEEKLY + RADIOTHERAPY  Treatment Goal: Curative  Status: Active  Start Date: 1/23/2023  End Date: 3/6/2023  Provider: Venu Tamez MD  Chemotherapy: CISplatin (Platinol) 40 mg/m2 = 66 mg in sodium chloride 0.9% 631 mL chemo infusion, 40 mg/m2 = 66 mg, Intravenous, Clinic/HOD  1 time, 7 of 7 cycles  Administration: 66 mg (1/23/2023), 66 mg (2/13/2023), 66 mg (2/6/2023), 66 mg (2/20/2023), 66 mg (2/27/2023), 66 mg (3/6/2023)           HPI   73 y.o. male presents with the above treatment history.  He is status post total glossectomy and ALT free flap reconstruction.  No new complaints.  He reports some relief from secretions after 2 Botox injections.    Review of Systems   Constitutional: Negative for fatigue and unexpected weight change.   HENT: Per HPI.  Eyes: Negative for visual disturbance.   Respiratory: Negative for shortness of breath, hemoptysis   Cardiovascular: Negative for chest pain and palpitations.   Musculoskeletal: Negative for decreased ROM, back pain.   Skin: Negative for rash, sunburn, itching.   Neurological: Negative for dizziness and seizures.   Hematological: Negative for adenopathy. Does not bruise/bleed easily.   Endocrine: Negative for rapid weight loss/weight gain, heat/cold intolerance.     Past Medical History   Patient Active Problem List   Diagnosis    Melanocytic nevus    Body mass index (BMI) of 29.0-29.9 in adult    Benign hypertension    Overweight    Paroxysmal atrial fibrillation    Type 2 diabetes mellitus with diabetic arthropathy, with long-term current use of insulin    Fatty liver disease, nonalcoholic    False positive serological test for hepatitis C    Hyperlipidemia    Type 2 diabetes mellitus with hyperglycemia, without long-term current use of insulin    Gastroesophageal reflux disease without esophagitis    Type 2 diabetes mellitus with hyperglycemia    Vitamin B12 deficiency    Hyperuricemia    Hypovitaminosis D    Proteinuria    On enteral nutrition    Larynx cancer    Dehydration    NSVT (nonsustained ventricular tachycardia)    Adverse effect of adrenal cortical steroids, sequela    S/P laryngectomy    Hypocalcemia    Aphonia    Dysphagia, pharyngoesophageal    Acute pain of both shoulders    Bilateral arm weakness    Oral bleeding     Malignant neoplasm of base of tongue    Advanced care planning/counseling discussion    Iron deficiency anemia due to chronic blood loss    Postoperative hypothyroidism    Pressure injury of sacral region, stage 1    Pneumatosis intestinalis    Nausea and vomiting    Neutropenia    Abnormal CT scan, neck    Open wound of neck    Open wnd of pharynx    Open wound of left thigh    Open wound of mouth    Tracheostomy in place    Malnutrition of mild degree    Type 2 diabetes mellitus, without long-term current use of insulin    Laryngeal cancer    Hypocalcemia    Hyperbilirubinemia    Elevated transaminase level    Hyponatremia    PEG (percutaneous endoscopic gastrostomy) adjustment/replacement/removal    Hypothyroidism    Dehydration    Pharyngocutaneous fistula    Protein malnutrition    Elevated alkaline phosphatase level    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    Scar conditions and fibrosis of skin    Decreased ROM of neck    Iron deficiency anemia    Chemotherapy-induced peripheral neuropathy    Phantom pain    Chronic low back pain    Palliative care by specialist    Cancer related pain           Past Surgical History   Past Surgical History:   Procedure Laterality Date    CATARACT EXTRACTION W/  INTRAOCULAR LENS IMPLANT Right 8/16/2023    Procedure: CEIOL OD  NPO-peg;  Surgeon: Stoney Munoz MD;  Location: [facility] OR;  Service: Ophthalmology;  Laterality: Right;    CATARACT EXTRACTION W/  INTRAOCULAR LENS IMPLANT Left 10/18/2023    Procedure: CEIOL OS npo peg;  Surgeon: Stoney Munoz MD;  Location: [facility] OR;  Service: Ophthalmology;  Laterality: Left;    DIRECT LARYNGOBRONCHOSCOPY N/A 12/27/2021    Procedure: LARYNGOSCOPY, DIRECT, WITH BRONCHOSCOPY;  Surgeon: Flex Espinosa MD;  Location: [address];  Service: ENT;  Laterality: N/A;    DIRECT LARYNGOSCOPY  11/9/2022    Procedure: LARYNGOSCOPY, DIRECT;  Surgeon: Jesse James MD;  Location: [facility] OR [address] [place] [address];  Service: ENT;;    DISSECTION OF NECK  Bilateral 1/6/2022    Procedure: DISSECTION, NECK;  Surgeon: Jesse James MD;  Location: [facility] OR [facility] FLR;  Service: ENT;  Laterality: Bilateral;    DISSECTION OF NECK Bilateral 11/9/2022    Procedure: DISSECTION, NECK;  Surgeon: Jesse James MD;  Location: [facility] OR [facility] FLR;  Service: ENT;  Laterality: Bilateral;    FLAP PROCEDURE Right 1/6/2022    Procedure: CREATION, FREE FLAP;  Surgeon: Alise Hart MD;  Location: [facility] OR [facility] FLR;  Service: ENT;  Laterality: Right;  Ischemic start 1351  Ischemic stop 1502    FLAP PROCEDURE Left 11/9/2022    Procedure: CREATION, FREE FLAP, ALT;  Surgeon: Alise Hart MD;  Location: [facility] OR [facility] FLR;  Service: ENT;  Laterality: Left;    GLOSSECTOMY Bilateral 11/9/2022    Procedure: TOTAL GLOSSECTOMY;  Surgeon: Jesse James MD;  [facility];  Service: ENT;  Laterality: Bilateral;    INSERTION OF TUNNELED CENTRAL VENOUS CATHETER (CVC) WITH SUBCUTANEOUS PORT N/A 6/9/2022    Procedure: [id number];  Surgeon: Jesus Viera MD;  Location: [facility] OR;  Service: General;  Laterality: N/A;    INSERTION OF TUNNELED CENTRAL VENOUS CATHETER (CVC) WITH SUBCUTANEOUS PORT Right 1/12/2023    Procedure: [id number];  Surgeon: Abdulaziz Le Jr., MD;  Location: [facility] OR;  Service: General;  Laterality: Right;    LARYNGECTOMY N/A 1/6/2022    Procedure: LARYNGECTOMY;  Surgeon: Jesse James MD;  [facility];  Service: ENT;  Laterality: N/A;    LARYNGOSCOPY N/A 8/4/2020    Procedure: Suspension microlaryngoscopy with biopsy, possible KTP laser treatment/excision;  Surgeon: Stew Noel MD;  [facility];  Service: ENT;  Laterality: N/A;  Microscope, telescopes, tower, microinstruments, KTP laser, rep conf# [id number] IC 7/28.    LARYNGOSCOPY N/A 3/16/2021    Procedure: Suspension microlaryngoscopy with excision of lesion, possible CO2 laser;  Surgeon: Stew Noel MD;  Location: [facility] OR 2ND FLR;   Service: ENT;  Laterality: N/A;  Microscope, telescopes, tower, microinstruments, CO2 laser, rep conf# [id number] IC 3/4.    LARYNGOSCOPY N/A 4/1/2021    Procedure: Suspension microlaryngoscopy with KTP laser excision of lesion;  Surgeon: Stew Noel MD;  [facility];  Service: ENT;  Laterality: N/A;  Microscope, telescopes, tower, microinstruments, 70 degree scope, vocal fold , KTP laser, rep conf# [id number] BC    LARYNGOSCOPY N/A 12/9/2021    Procedure: Suspension microlaryngoscopy with biopsy;  Surgeon: Stew Noel MD;  [facility];  Service: ENT;  Laterality: N/A;  Microscope, telescopes, tower, microinstruments    LARYNGOSCOPY N/A 1/6/2022    Procedure: LARYNGOSCOPY;  Surgeon: Jesse James MD;  [facility];  Service: ENT;  Laterality: N/A;    LARYNGOSCOPY N/A 4/27/2022    Procedure: LARYNGOSCOPY WITH BIOPSY;  Surgeon: Jesse James MD;  [facility];  Service: ENT;  Laterality: N/A;    MICROLARYNGOSCOPY N/A 3/17/2020    Procedure: MICROLARYNGOSCOPY;  Surgeon: Jung Xiao MD;  Location: [facility];  Service: ENT;  Laterality: N/A;  Laser Microlaryngoscopy  NEED TO SCHEDULE LASER from [facility] [id number]    PHARYNGECTOMY  11/9/2022    Procedure: TOTAL PHARYNGECTOMY;  Surgeon: Jesse James MD;  [facility];  Service: ENT;;    REIMPLANTATION OF PARATHYROID TISSUE N/A 1/6/2022    Procedure: REIMPLANTATION, PARATHYROID TISSUE;  Surgeon: Jesse James MD;  [facility];  Service: ENT;  Laterality: N/A;    SKIN SPLIT GRAFT Right 11/9/2022    Procedure: APPLICATION, GRAFT, SKIN, SPLIT-THICKNESS;  Surgeon: Jesse James MD;  [facility];  Service: ENT;  Laterality: Right;    THYROIDECTOMY  1/6/2022    Procedure: THYROIDECTOMY;  Surgeon: Jesse James MD;  Location: [facility] OR [address] [place] [address];  Service: ENT;;    TRACHEOSTOMY N/A 12/27/2021    Procedure: CREATION,  TRACHEOSTOMY;  Surgeon: Flex Espinosa MD;  Location: [facility] OR [address] [place] [address];  Service: ENT;  Laterality: N/A;         Family History   Family History   Problem Relation Age of Onset    Abnormal EKG Mother     Diabetes Father     Heart disease Father     Hypertension Father            Social History   .  Social History     Socioeconomic History    Marital status:      Spouse name: Janel Batres    Number of children: 2   Occupational History    Occupation: [employer]     Employer: [employer]   Tobacco Use    Smoking status: Never    Smokeless tobacco: Never   Substance and Sexual Activity    Alcohol use: Not Currently     Comment: occasional    Drug use: No    Sexual activity: Yes     Partners: Female   Social History Narrative    ** Merged History Encounter **         2 children from his 1st wife     Social Determinants of Health     Financial Resource Strain: Low Risk  (2/2/2024)    Overall Financial Resource Strain (CARDIA)     Difficulty of Paying Living Expenses: Not hard at all   Food Insecurity: No Food Insecurity (2/2/2024)    Hunger Vital Sign     Worried About Running Out of Food in the Last Year: Never true     Ran Out of Food in the Last Year: Never true   Transportation Needs: No Transportation Needs (2/2/2024)    PRAPARE - Transportation     Lack of Transportation (Medical): No     Lack of Transportation (Non-Medical): No   Physical Activity: Insufficiently Active (2/2/2024)    Exercise Vital Sign     Days of Exercise per Week: 3 days     Minutes of Exercise per Session: 30 min   Stress: No Stress Concern Present (2/2/2024)    [employer] - Occupational Stress Questionnaire     Feeling of Stress : Not at all   Social Connections: Moderately Integrated (2/2/2024)    Social Connection and Isolation Panel [NHANES]     Frequency of Communication with Friends and Family: More than three times a week     Frequency of Social Gatherings with Friends and Family: Patient declined      Attends Church Services: Never     Active Member of Clubs or Organizations: Yes     Attends Club or Organization Meetings: Never     Marital Status:    Housing Stability: Low Risk  (2/2/2024)    Housing Stability Vital Sign     Unable to Pay for Housing in the Last Year: No     Number of Places Lived in the Last Year: 1     Unstable Housing in the Last Year: No         Allergies   Review of patient's allergies indicates:   Allergen Reactions    Lovastatin Itching    Pollen extracts     Lovastatin Rash     Not confirmed but pt skeptical           Physical Exam     There were no vitals filed for this visit.          Body mass index is 23.56 kg/m².      General: AOx3, NAD   Respiratory:  Symmetric chest rise, normal effort  Oral Cavity:  Flap healthy. No worrisome lesions. Moderate trismus.  Oropharynx:  No masses/lesions of the posterior pharyngeal wall. Tonsillar fossa without lesions. Soft palate without masses. Midline uvula.   Neck: Stoma widely patent. Skin paddle healthy with good color and turgor.Well-healed neck incisions.No LAD. Moderate post-op, post-treatment fibrosis. Submental neck diffusely full.  Face: House Brackmann I bilaterally.    Neopharynx clear on mirror exam.      Assessment/Plan  Problem List Items Addressed This Visit          Oncology    Malignant neoplasm of base of tongue - Primary     ZAY. RTC 3 mos, sooner if he desires additional Botox for secretion mgmt.

## 2024-02-07 ENCOUNTER — HOSPITAL ENCOUNTER (OUTPATIENT)
Dept: RADIOLOGY | Facility: HOSPITAL | Age: 74
Discharge: HOME | End: 2024-02-07
Attending: INTERNAL MEDICINE
Payer: MEDICARE

## 2024-02-07 VITALS — WEIGHT: 141 LBS | HEIGHT: 66 IN | BODY MASS INDEX: 22.66 KG/M2

## 2024-02-07 DIAGNOSIS — C32.9 LARYNX CANCER: ICD-10-CM

## 2024-02-07 DIAGNOSIS — C01 MALIGNANT NEOPLASM OF BASE OF TONGUE: ICD-10-CM

## 2024-02-07 LAB — GLUCOSE SERPL-MCNC: 115 MG/DL (ref 70–110)

## 2024-02-07 PROCEDURE — 78815 PET IMAGE W/CT SKULL-THIGH: CPT | Mod: TC,PS,PO

## 2024-02-07 PROCEDURE — 82962 GLUCOSE BLOOD TEST: CPT | Mod: PO

## 2024-02-08 ENCOUNTER — CLINICAL SUPPORT (OUTPATIENT)
Dept: REHABILITATION | Facility: HOSPITAL | Age: 74
End: 2024-02-08
Payer: MEDICARE

## 2024-02-08 DIAGNOSIS — L90.5 SCAR CONDITIONS AND FIBROSIS OF SKIN: Primary | ICD-10-CM

## 2024-02-08 DIAGNOSIS — R29.898 DECREASED ROM OF NECK: ICD-10-CM

## 2024-02-08 DIAGNOSIS — I89.0 LYMPHEDEMA DUE TO RADIATION: ICD-10-CM

## 2024-02-08 PROCEDURE — 97140 MANUAL THERAPY 1/> REGIONS: CPT | Mod: PO

## 2024-02-08 PROCEDURE — 97110 THERAPEUTIC EXERCISES: CPT | Mod: PO

## 2024-02-08 NOTE — SUBJECTIVE & OBJECTIVE
Doxycycline Pregnancy And Lactation Text: This medication is Pregnancy Category D and not consider safe during pregnancy. It is also excreted in breast milk but is considered safe for shorter treatment courses. Interval History: NAEON    Medications:  Continuous Infusions:  Scheduled Meds:   calcitrioL  0.25 mcg Per G Tube Daily    calcium carbonate  1,000 mg Per G Tube TID WM    enoxaparin  40 mg Subcutaneous Daily    gabapentin  300 mg Per G Tube Q8H    levothyroxine  100 mcg Per G Tube Before breakfast    polyethylene glycol  17 g Per G Tube Daily    senna-docusate 8.6-50 mg  1 tablet Per G Tube BID    traZODone  50 mg Per G Tube QHS     PRN Meds:sodium chloride 0.9%, sodium chloride 0.9%, dextrose 10%, dextrose 10%, glucagon (human recombinant), ibuprofen, insulin aspart U-100, naloxone, oxyCODONE, prochlorperazine, sodium chloride 0.9%, sodium chloride 0.9%     Review of patient's allergies indicates:   Allergen Reactions    Pollen extracts     Lovastatin Rash     Not confirmed but pt skeptical     Objective:     Vital Signs (24h Range):  Temp:  [96.1 °F (35.6 °C)-98.8 °F (37.1 °C)] 98.2 °F (36.8 °C)  Pulse:  [79-90] 89  Resp:  [16-18] 16  SpO2:  [92 %-98 %] 97 %  BP: (101-113)/(56-71) 104/56       Lines/Drains/Airways       Central Venous Catheter Line  Duration             Port A Cath Single Lumen left subclavian -- days              Drain  Duration                  Open Drain Anterior;Left Neck Penrose -- days         Open Drain Right;Anterior Neck Penrose -- days         Gastrostomy/Enterostomy 06/09/22 1935 midline feeding 160 days         Closed/Suction Drain 11/09/22 1518 Left;Anterior Thigh Bulb 19 Fr. 7 days              Airway  Duration                  Surgical Airway Shiley Cuffed -- days         Laryngectomy (Do Not Remove) 06/09/22 1414 Laryngectomy stoma 160 days         Airway - Non-Surgical 11/09/22 0856 Coil Wire Tube 7 days              Peripheral Intravenous Line  Duration                  Peripheral IV - Single Lumen 11/13/22 0937 20 G Anterior;Right Forearm 3 days         Peripheral IV - Single Lumen 11/13/22 1617 18 G Anterior;Left;Proximal Forearm 3 days                    Physical  Exam  Constitutional:       General: He is not in acute distress.  HENT:      Head:      Comments: Diffuse facial edema bilaterally but stable, soft to palpation     Mouth/Throat:      Comments: Flap soft to palpation, sutures to FOM intact   Eyes:      Extraocular Movements: Extraocular movements intact.   Neck:      Comments: Trach in place to stoma   Skin graft to midline neck overlying flap, dry  Staples c/d/I on lateral neck, penroses in place bilaterally   Doppler with good venous and arterial signals   Cardiovascular:      Rate and Rhythm: Normal rate and regular rhythm.   Pulmonary:      Effort: No respiratory distress.   Neurological:      Mental Status: He is alert.     Significant Labs:  CBC:   Recent Labs   Lab 11/16/22  0430   WBC 7.19  7.19   RBC 2.51*  2.51*   HGB 7.6*  7.6*   HCT 23.8*  23.8*     261   MCV 95  95   MCH 30.3  30.3   MCHC 31.9*  31.9*     CMP:   Recent Labs   Lab 11/16/22  0430   *   CALCIUM 8.2*   ALBUMIN 2.3*   PROT 5.8*      K 3.7   CO2 20*      BUN 24*   CREATININE 0.7   ALKPHOS 59   ALT 8*   AST 11   BILITOT 0.5       Significant Diagnostics:  None   Minocycline Counseling: Patient advised regarding possible photosensitivity and discoloration of the teeth, skin, lips, tongue and gums.  Patient instructed to avoid sunlight, if possible.  When exposed to sunlight, patients should wear protective clothing, sunglasses, and sunscreen.  The patient was instructed to call the office immediately if the following severe adverse effects occur:  hearing changes, easy bruising/bleeding, severe headache, or vision changes.  The patient verbalized understanding of the proper use and possible adverse effects of minocycline.  All of the patient's questions and concerns were addressed. Topical Sulfur Applications Pregnancy And Lactation Text: This medication is Pregnancy Category C and has an unknown safety profile during pregnancy. It is unknown if this topical medication is excreted in breast milk. Tetracycline Pregnancy And Lactation Text: This medication is Pregnancy Category D and not consider safe during pregnancy. It is also excreted in breast milk. Topical Retinoid counseling:  Patient advised to apply a pea-sized amount only at bedtime and wait 30 minutes after washing their face before applying.  If too drying, patient may add a non-comedogenic moisturizer. The patient verbalized understanding of the proper use and possible adverse effects of retinoids.  All of the patient's questions and concerns were addressed. Bactrim Counseling:  I discussed with the patient the risks of sulfa antibiotics including but not limited to GI upset, allergic reaction, drug rash, diarrhea, dizziness, photosensitivity, and yeast infections.  Rarely, more serious reactions can occur including but not limited to aplastic anemia, agranulocytosis, methemoglobinemia, blood dyscrasias, liver or kidney failure, lung infiltrates or desquamative/blistering drug rashes. Use Enhanced Medication Counseling?: No Dapsone Pregnancy And Lactation Text: This medication is Pregnancy Category C and is not considered safe during pregnancy or breast feeding. Azithromycin Counseling:  I discussed with the patient the risks of azithromycin including but not limited to GI upset, allergic reaction, drug rash, diarrhea, and yeast infections. Birth Control Pills Pregnancy And Lactation Text: This medication should be avoided if pregnant and for the first 30 days post-partum. Isotretinoin Counseling: Patient should get monthly blood tests, not donate blood, not drive at night if vision affected, not share medication, and not undergo elective surgery for 6 months after tx completed. Side effects reviewed, pt to contact office should one occur. Azelaic Acid Counseling: Patient counseled that medicine may cause skin irritation and to avoid applying near the eyes.  In the event of skin irritation, the patient was advised to reduce the amount of the drug applied or use it less frequently.   The patient verbalized understanding of the proper use and possible adverse effects of azelaic acid.  All of the patient's questions and concerns were addressed. Tazorac Pregnancy And Lactation Text: This medication is not safe during pregnancy. It is unknown if this medication is excreted in breast milk. Topical Clindamycin Pregnancy And Lactation Text: This medication is Pregnancy Category B and is considered safe during pregnancy. It is unknown if it is excreted in breast milk. High Dose Vitamin A Counseling: Side effects reviewed, pt to contact office should one occur. Benzoyl Peroxide Counseling: Patient counseled that medicine may cause skin irritation and bleach clothing.  In the event of skin irritation, the patient was advised to reduce the amount of the drug applied or use it less frequently.   The patient verbalized understanding of the proper use and possible adverse effects of benzoyl peroxide.  All of the patient's questions and concerns were addressed. Spironolactone Pregnancy And Lactation Text: This medication can cause feminization of the male fetus and should be avoided during pregnancy. The active metabolite is also found in breast milk. Azithromycin Pregnancy And Lactation Text: This medication is considered safe during pregnancy and is also secreted in breast milk. Topical Sulfur Applications Counseling: Topical Sulfur Counseling: Patient counseled that this medication may cause skin irritation or allergic reactions.  In the event of skin irritation, the patient was advised to reduce the amount of the drug applied or use it less frequently.   The patient verbalized understanding of the proper use and possible adverse effects of topical sulfur application.  All of the patient's questions and concerns were addressed. Winlevi Counseling:  I discussed with the patient the risks of topical clascoterone including but not limited to erythema, scaling, itching, and stinging. Patient voiced their understanding. Tetracycline Counseling: Patient counseled regarding possible photosensitivity and increased risk for sunburn.  Patient instructed to avoid sunlight, if possible.  When exposed to sunlight, patients should wear protective clothing, sunglasses, and sunscreen.  The patient was instructed to call the office immediately if the following severe adverse effects occur:  hearing changes, easy bruising/bleeding, severe headache, or vision changes.  The patient verbalized understanding of the proper use and possible adverse effects of tetracycline.  All of the patient's questions and concerns were addressed. Patient understands to avoid pregnancy while on therapy due to potential birth defects. Benzoyl Peroxide Pregnancy And Lactation Text: This medication is Pregnancy Category C. It is unknown if benzoyl peroxide is excreted in breast milk. Doxycycline Counseling:  Patient counseled regarding possible photosensitivity and increased risk for sunburn.  Patient instructed to avoid sunlight, if possible.  When exposed to sunlight, patients should wear protective clothing, sunglasses, and sunscreen.  The patient was instructed to call the office immediately if the following severe adverse effects occur:  hearing changes, easy bruising/bleeding, severe headache, or vision changes.  The patient verbalized understanding of the proper use and possible adverse effects of doxycycline.  All of the patient's questions and concerns were addressed. High Dose Vitamin A Pregnancy And Lactation Text: High dose vitamin A therapy is contraindicated during pregnancy and breast feeding. Aklief counseling:  Patient advised to apply a pea-sized amount only at bedtime and wait 30 minutes after washing their face before applying.  If too drying, patient may add a non-comedogenic moisturizer.  The most commonly reported side effects including irritation, redness, scaling, dryness, stinging, burning, itching, and increased risk of sunburn.  The patient verbalized understanding of the proper use and possible adverse effects of retinoids.  All of the patient's questions and concerns were addressed. Topical Retinoid Pregnancy And Lactation Text: This medication is Pregnancy Category C. It is unknown if this medication is excreted in breast milk. Bactrim Pregnancy And Lactation Text: This medication is Pregnancy Category D and is known to cause fetal risk.  It is also excreted in breast milk. Erythromycin Counseling:  I discussed with the patient the risks of erythromycin including but not limited to GI upset, allergic reaction, drug rash, diarrhea, increase in liver enzymes, and yeast infections. Detail Level: Detailed Topical Clindamycin Counseling: Patient counseled that this medication may cause skin irritation or allergic reactions.  In the event of skin irritation, the patient was advised to reduce the amount of the drug applied or use it less frequently.   The patient verbalized understanding of the proper use and possible adverse effects of clindamycin.  All of the patient's questions and concerns were addressed. Azelaic Acid Pregnancy And Lactation Text: This medication is considered safe during pregnancy and breast feeding. Spironolactone Counseling: Patient advised regarding risks of diarrhea, abdominal pain, hyperkalemia, birth defects (for female patients), liver toxicity and renal toxicity. The patient may need blood work to monitor liver and kidney function and potassium levels while on therapy. The patient verbalized understanding of the proper use and possible adverse effects of spironolactone.  All of the patient's questions and concerns were addressed. Aklief Pregnancy And Lactation Text: It is unknown if this medication is safe to use during pregnancy.  It is unknown if this medication is excreted in breast milk.  Breastfeeding women should use the topical cream on the smallest area of the skin for the shortest time needed while breastfeeding.  Do not apply to nipple and areola. Tazorac Counseling:  Patient advised that medication is irritating and drying.  Patient may need to apply sparingly and wash off after an hour before eventually leaving it on overnight.  The patient verbalized understanding of the proper use and possible adverse effects of tazorac.  All of the patient's questions and concerns were addressed. Birth Control Pills Counseling: Birth Control Pill Counseling: I discussed with the patient the potential side effects of OCPs including but not limited to increased risk of stroke, heart attack, thrombophlebitis, deep venous thrombosis, hepatic adenomas, breast changes, GI upset, headaches, and depression.  The patient verbalized understanding of the proper use and possible adverse effects of OCPs. All of the patient's questions and concerns were addressed. Erythromycin Pregnancy And Lactation Text: This medication is Pregnancy Category B and is considered safe during pregnancy. It is also excreted in breast milk. Sarecycline Counseling: Patient advised regarding possible photosensitivity and discoloration of the teeth, skin, lips, tongue and gums.  Patient instructed to avoid sunlight, if possible.  When exposed to sunlight, patients should wear protective clothing, sunglasses, and sunscreen.  The patient was instructed to call the office immediately if the following severe adverse effects occur:  hearing changes, easy bruising/bleeding, severe headache, or vision changes.  The patient verbalized understanding of the proper use and possible adverse effects of sarecycline.  All of the patient's questions and concerns were addressed. Isotretinoin Pregnancy And Lactation Text: This medication is Pregnancy Category X and is considered extremely dangerous during pregnancy. It is unknown if it is excreted in breast milk. Winlevi Pregnancy And Lactation Text: This medication is considered safe during pregnancy and breastfeeding. Dapsone Counseling: I discussed with the patient the risks of dapsone including but not limited to hemolytic anemia, agranulocytosis, rashes, methemoglobinemia, kidney failure, peripheral neuropathy, headaches, GI upset, and liver toxicity.  Patients who start dapsone require monitoring including baseline LFTs and weekly CBCs for the first month, then every month thereafter.  The patient verbalized understanding of the proper use and possible adverse effects of dapsone.  All of the patient's questions and concerns were addressed. Detail Level: Zone

## 2024-02-08 NOTE — PROGRESS NOTES
OccupationalTherapy Daily Treatment Note     Name: Cyrus Batres Jr.  Clinic Number: [id number]    Therapy Diagnosis:   Encounter Diagnoses   Name Primary?    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  Functional change: none    Pain: 0/10  Location: neck     Objective     Response to previous treatment: The area below the chin is much softer and the area has visibly reduced below the chin.  The scar is more mobile.  There was more wrinkling of the skin noted. The jaw line is more palpable and the face is more narrow.  Treatment:   Nicolás received the following manual therapy techniques:- Manual Lymphatic Drainage and Soft tissue Mobilization were performed to the: face and neck for 60 minutes, including: Manual Lymph Drainage and short stretch compression bandaging     MANUAL LYMPHATIC DRAINAGE:  Drainage of the Right and Left upper quadrant from the axilla extending up to the neck, over the ear and to the side of the face to the jaw line.  While supine with head elevate,  Drainage provided from the front of the neck, B subclavian regions,  Across ant chest and top of shoulder,  drainage of the entire face with return to the axillas.      Nicolás received therapeutic exercises to develop cardiac  output for 8 minutes including:   THERAPEUTIC EXERCISE- Patient performed Nustep therex for 8 min set at 3 resistance to increase cardiac output and increase lymph circulation.   THERAPEUTIC EXERCISES:  Continue Home Exercise Program of Active Range Of Motion, stretching, and postural correction.     Home Exercises Provided and Patient Education Provided     Education provided:      PATIENT/FAMILY Education: garment wearing schedule,  Home Exercise Program,  Beginning of self massage,  Self or assisted bandaging , and Risk reduction    KINESIO TAPE: Nicolás was educated on kinesio tape wearing schedule and to remove if having ill effects.  Nicolás verbalized understanding of the above education.      Written Home Exercises Provided: Nicolás demonstrated good  understanding of the education provided.        Assessment     He arrived to the session and the area below the chin was not as pronounced as it was on the evaluation.  He is using a chip bag to address the fibrotic fluid below the chin.  He tolerated Manual lymph drainage with out an issue.  He brought in the silicone gel pads that were recommended to be placed along the scar on the neck which is keeping fluid below his chin trapped but they are not staying in place due to the moist air coming out of the trach.  It was recommended that he back them away from the trach.  Kinesio tape is being used as a manual technique to increase lymph flow, direct lymph uptake and improve scar mobility.  The area below the chin was softer at the end of the session.  He has had a significant reduction of swelling in the face and below the chin.    Nicolás Is progressing well towards his goals.   Patient  prognosis is Good.     Patient will continue to benefit from skilled outpatient occupational therapy to address the deficits listed in the problem list box on initial evaluation, provide pt/family education and to maximize pt's level of independence in the home and community  environment.     Patient 's spiritual, cultural and educational needs considered and patient agreeable to plan of care and goals.     Anticipated barriers to occupational therapy: non verbal    Goals    Short Term Goals: (4 weeks)  In Progress  Patient to be Independent and compliant with Home Exercise Program.  Met   Decrease girth in the face and neck by 6 cm for improved neck mobility.  Met   Patient will demonstrate 100% knowledge of lymphedema precautions and signs of infection.   In Progress  Patient will perform self-scar mobility techniques.  In Progress  Patient will perform self lymph drainage techniques.  In Progress  Patient will tolerate compression garment wearing schedule.     Long Term Goals: (12 weeks)  Met   Decrease girth in face and neck by 10 cm for improved neck mobility.  In Progress  Patient will show reduction in girth to mild or less with improved contour of face and neck.  In Progress  Patient to malinda/doff compression garment with daily compliance.       Plan      Patient to be seen 1-2 x per week for 90 days for the medically necessary treatments to include: decongestive massage- Manual lymphatic drainage, kinesio tape, scar massage, education in lymphedema precautions, self lymph drainage massage, and assistance in obtaining appropriate compression garment. Therapeutic exercise, postural correction, and progression of Home Exercise Program.       Thania Plata, OT, CLT

## 2024-02-12 ENCOUNTER — CLINICAL SUPPORT (OUTPATIENT)
Dept: REHABILITATION | Facility: HOSPITAL | Age: 74
End: 2024-02-12
Payer: MEDICARE

## 2024-02-12 DIAGNOSIS — R29.898 DECREASED ROM OF NECK: ICD-10-CM

## 2024-02-12 DIAGNOSIS — L90.5 SCAR CONDITIONS AND FIBROSIS OF SKIN: ICD-10-CM

## 2024-02-12 DIAGNOSIS — I89.0 LYMPHEDEMA DUE TO RADIATION: Primary | ICD-10-CM

## 2024-02-12 PROCEDURE — 97140 MANUAL THERAPY 1/> REGIONS: CPT | Mod: PO

## 2024-02-12 PROCEDURE — 97110 THERAPEUTIC EXERCISES: CPT | Mod: PO

## 2024-02-12 NOTE — PROGRESS NOTES
OccupationalTherapy Daily Treatment Note     Name: Cyrus Batres Jr.  Clinic Number: [id number]    Therapy Diagnosis:   Encounter Diagnoses   Name Primary?    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  Functional change: none    Pain: 0/10  Location: neck     Objective     Response to previous treatment: The area below the chin is much softer and the area has visibly reduced below the chin.  The scar is more mobile.  There was more wrinkling of the skin noted. The jaw line is more palpable and the face is more narrow. He indicated that there is an area below his chin that is tight.  Treatment:   Nicolás received the following manual therapy techniques:- Manual Lymphatic Drainage and Soft tissue Mobilization were performed to the: face and neck for 60 minutes, including: Manual Lymph Drainage and short stretch compression bandaging     MANUAL LYMPHATIC DRAINAGE:  Drainage of the Right and Left upper quadrant from the axilla extending up to the neck, over the ear and to the side of the face to the jaw line.  While supine with head elevate,  Drainage provided from the front of the neck, B subclavian regions,  Across ant chest and top of shoulder,  drainage of the entire face with return to the  axillas.      Nicolás received therapeutic exercises to develop cardiac output for 8 minutes including:   THERAPEUTIC EXERCISE- Patient performed Nustep therex for 8 min set at 3 resistance to increase cardiac output and increase lymph circulation.   THERAPEUTIC EXERCISES:  Continue Home Exercise Program of Active Range Of Motion, stretching, and postural correction.     Home Exercises Provided and Patient Education Provided     Education provided:      PATIENT/FAMILY Education: garment wearing schedule,  Home Exercise Program,  Beginning of self massage,  Self or assisted bandaging , and Risk reduction    KINESIO TAPE: Nicolás was educated on kinesio tape wearing schedule and to remove if having ill effects.  Nicolás verbalized understanding of the above education.      Written Home Exercises Provided: Nicolás demonstrated good  understanding of the education provided.        Assessment     He arrived to the session and the area below the chin was not as pronounced as it was on the evaluation.  He is using a chip bag to address the fibrotic fluid below the chin.  He tolerated Manual lymph drainage with out an issue.  He brought in the silicone gel pads that were recommended to be placed along the scar on the neck which is keeping fluid below his chin trapped but they are not staying in place due to the moist air coming out of the trach.  It was recommended that he back them away from the trach.  Kinesio tape is being used as a manual technique to increase lymph flow, direct lymph uptake and improve scar mobility.  The area below the chin was softer at the end of the session.  He has had a significant reduction of swelling in the face and below the chin.    Nicolás Is progressing well towards his goals.   Patient  prognosis is Good.     Patient will continue to benefit from skilled outpatient occupational therapy to address the deficits listed in the problem list box on initial evaluation, provide pt/family education and  to maximize pt's level of independence in the home and community environment.     Patient 's spiritual, cultural and educational needs considered and patient agreeable to plan of care and goals.     Anticipated barriers to occupational therapy: non verbal    Goals    Short Term Goals: (4 weeks)  In Progress  Patient to be Independent and compliant with Home Exercise Program.  Met   Decrease girth in the face and neck by 6 cm for improved neck mobility.  Met   Patient will demonstrate 100% knowledge of lymphedema precautions and signs of infection.   In Progress  Patient will perform self-scar mobility techniques.  In Progress  Patient will perform self lymph drainage techniques.  In Progress  Patient will tolerate compression garment wearing schedule.     Long Term Goals: (12 weeks)  Met   Decrease girth in face and neck by 10 cm for improved neck mobility.  In Progress  Patient will show reduction in girth to mild or less with improved contour of face and neck.  In Progress  Patient to malinda/doff compression garment with daily compliance.       Plan      Patient to be seen 1-2 x per week for 90 days for the medically necessary treatments to include: decongestive massage- Manual lymphatic drainage, kinesio tape, scar massage, education in lymphedema precautions, self lymph drainage massage, and assistance in obtaining appropriate compression garment. Therapeutic exercise, postural correction, and progression of Home Exercise Program.       Thania Plata, OT, CLT

## 2024-02-19 ENCOUNTER — CLINICAL SUPPORT (OUTPATIENT)
Dept: REHABILITATION | Facility: HOSPITAL | Age: 74
End: 2024-02-19
Payer: MEDICARE

## 2024-02-19 DIAGNOSIS — I89.0 LYMPHEDEMA DUE TO RADIATION: Primary | ICD-10-CM

## 2024-02-19 PROCEDURE — 97140 MANUAL THERAPY 1/> REGIONS: CPT | Mod: PO

## 2024-02-19 PROCEDURE — 97110 THERAPEUTIC EXERCISES: CPT | Mod: PO

## 2024-02-20 ENCOUNTER — OFFICE VISIT (OUTPATIENT)
Dept: HEMATOLOGY/ONCOLOGY | Facility: CLINIC | Age: 74
End: 2024-02-20
Payer: MEDICARE

## 2024-02-20 VITALS
DIASTOLIC BLOOD PRESSURE: 65 MMHG | WEIGHT: 148 LBS | RESPIRATION RATE: 16 BRPM | HEART RATE: 75 BPM | TEMPERATURE: 97 F | HEIGHT: 66 IN | BODY MASS INDEX: 23.78 KG/M2 | SYSTOLIC BLOOD PRESSURE: 113 MMHG

## 2024-02-20 DIAGNOSIS — C01 MALIGNANT NEOPLASM OF BASE OF TONGUE: Primary | ICD-10-CM

## 2024-02-20 DIAGNOSIS — C32.9 LARYNGEAL CANCER: ICD-10-CM

## 2024-02-20 DIAGNOSIS — Z93.0 TRACHEOSTOMY IN PLACE: ICD-10-CM

## 2024-02-20 DIAGNOSIS — C32.9 LARYNX CANCER: ICD-10-CM

## 2024-02-20 DIAGNOSIS — Z90.02 S/P LARYNGECTOMY: ICD-10-CM

## 2024-02-20 DIAGNOSIS — E53.8 VITAMIN B12 DEFICIENCY: ICD-10-CM

## 2024-02-20 DIAGNOSIS — D50.9 IRON DEFICIENCY ANEMIA, UNSPECIFIED IRON DEFICIENCY ANEMIA TYPE: ICD-10-CM

## 2024-02-20 DIAGNOSIS — I89.0 LYMPHEDEMA DUE TO RADIATION: ICD-10-CM

## 2024-02-20 DIAGNOSIS — D50.0 IRON DEFICIENCY ANEMIA DUE TO CHRONIC BLOOD LOSS: ICD-10-CM

## 2024-02-20 PROCEDURE — 99214 OFFICE O/P EST MOD 30 MIN: CPT | Mod: S$GLB,,, | Performed by: INTERNAL MEDICINE

## 2024-02-20 PROCEDURE — 1126F AMNT PAIN NOTED NONE PRSNT: CPT | Mod: CPTII,S$GLB,, | Performed by: INTERNAL MEDICINE

## 2024-02-20 PROCEDURE — 1157F ADVNC CARE PLAN IN RCRD: CPT | Mod: CPTII,S$GLB,, | Performed by: INTERNAL MEDICINE

## 2024-02-20 PROCEDURE — 3072F LOW RISK FOR RETINOPATHY: CPT | Mod: CPTII,S$GLB,, | Performed by: INTERNAL MEDICINE

## 2024-02-20 PROCEDURE — 3074F SYST BP LT 130 MM HG: CPT | Mod: CPTII,S$GLB,, | Performed by: INTERNAL MEDICINE

## 2024-02-20 PROCEDURE — 3078F DIAST BP <80 MM HG: CPT | Mod: CPTII,S$GLB,, | Performed by: INTERNAL MEDICINE

## 2024-02-20 PROCEDURE — 1101F PT FALLS ASSESS-DOCD LE1/YR: CPT | Mod: CPTII,S$GLB,, | Performed by: INTERNAL MEDICINE

## 2024-02-20 PROCEDURE — 3008F BODY MASS INDEX DOCD: CPT | Mod: CPTII,S$GLB,, | Performed by: INTERNAL MEDICINE

## 2024-02-20 PROCEDURE — 3288F FALL RISK ASSESSMENT DOCD: CPT | Mod: CPTII,S$GLB,, | Performed by: INTERNAL MEDICINE

## 2024-02-20 PROCEDURE — 1160F RVW MEDS BY RX/DR IN RCRD: CPT | Mod: CPTII,S$GLB,, | Performed by: INTERNAL MEDICINE

## 2024-02-20 PROCEDURE — 3044F HG A1C LEVEL LT 7.0%: CPT | Mod: CPTII,S$GLB,, | Performed by: INTERNAL MEDICINE

## 2024-02-20 PROCEDURE — 1159F MED LIST DOCD IN RCRD: CPT | Mod: CPTII,S$GLB,, | Performed by: INTERNAL MEDICINE

## 2024-02-20 NOTE — PROGRESS NOTES
OccupationalTherapy Daily Treatment Note     Name: Cyrus Batres Jr.  Clinic Number: [id number]    Therapy Diagnosis:   Encounter Diagnosis   Name Primary?    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  Functional change: none    Pain: 0/10  Location: neck     Objective     Response to previous treatment: The area below the chin is much softer and the area has visibly reduced below the chin.  The scar is more mobile.  There was more wrinkling of the skin noted. The jaw line is more palpable and the face is more narrow. He indicated that there is an area below his chin that is tight.  Treatment:   Nicolás received the following manual therapy techniques:- Manual Lymphatic Drainage and Soft tissue Mobilization were performed to the: face and neck for 45 minutes, including: Manual Lymph Drainage and short stretch compression bandaging     MANUAL LYMPHATIC DRAINAGE:  Drainage of the Right and Left upper quadrant from the axilla extending up to the neck, over the ear and to the side of the face to the jaw line.  While supine with head elevate,  Drainage provided from the front of the neck, B subclavian regions,  Across ant chest and top of shoulder,  drainage of the entire face with return to the axillas.       Nicolás received therapeutic exercises to develop cardiac output for 8 minutes including:   THERAPEUTIC EXERCISE- Patient performed Nustep therex for 8 min set at 3 resistance to increase cardiac output and increase lymph circulation.   THERAPEUTIC EXERCISES:  Continue Home Exercise Program of Active Range Of Motion, stretching, and postural correction.     Home Exercises Provided and Patient Education Provided     Education provided:      PATIENT/FAMILY Education: garment wearing schedule,  Home Exercise Program,  Beginning of self massage,  Self or assisted bandaging , and Risk reduction    KINESIO TAPE: Nicolás was educated on kinesio tape wearing schedule and to remove if having ill effects.  Nicolás verbalized understanding of the above education.      Written Home Exercises Provided: Nicolás demonstrated good  understanding of the education provided.        Assessment     He arrived to the session and the area below the chin was not as pronounced as it was on the evaluation.  He is using a chip bag to address the fibrotic fluid below the chin.  He tolerated Manual lymph drainage with out an issue.  He brought in the silicone gel pads that were recommended to be placed along the scar on the neck which is keeping fluid below his chin trapped but they are not staying in place due to the moist air coming out of the trach.  It was recommended that he back them away from the trach.  Kinesio tape is being used as a manual technique to increase lymph flow, direct lymph uptake and improve scar mobility.  The area below the chin was softer at the end of the session.  He has had a significant reduction of swelling in the face and below the chin.    Nicolás Is progressing well towards his goals.   Patient  prognosis is Good.     Patient will continue to benefit from skilled outpatient occupational therapy to address the deficits listed in the problem list box on initial evaluation, provide pt/family education and to maximize  pt's level of independence in the home and community environment.     Patient 's spiritual, cultural and educational needs considered and patient agreeable to plan of care and goals.     Anticipated barriers to occupational therapy: non verbal    Goals    Short Term Goals: (4 weeks)  In Progress  Patient to be Independent and compliant with Home Exercise Program.  Met   Decrease girth in the face and neck by 6 cm for improved neck mobility.  Met   Patient will demonstrate 100% knowledge of lymphedema precautions and signs of infection.   In Progress  Patient will perform self-scar mobility techniques.  In Progress  Patient will perform self lymph drainage techniques.  In Progress  Patient will tolerate compression garment wearing schedule.     Long Term Goals: (12 weeks)  Met   Decrease girth in face and neck by 10 cm for improved neck mobility.  In Progress  Patient will show reduction in girth to mild or less with improved contour of face and neck.  In Progress  Patient to malinda/doff compression garment with daily compliance.       Plan      Patient to be seen 1-2 x per week for 90 days for the medically necessary treatments to include: decongestive massage- Manual lymphatic drainage, kinesio tape, scar massage, education in lymphedema precautions, self lymph drainage massage, and assistance in obtaining appropriate compression garment. Therapeutic exercise, postural correction, and progression of Home Exercise Program.       Thania Plata, OT, CLT

## 2024-02-20 NOTE — PROGRESS NOTES
[facility] Hematology/Oncology  PROGRESS NOTE -  Follow-up Visit      Subjective:       Patient ID:   NAME: Cyrus Batres Jr. : 1950     73 y.o. 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  The patient is here today to go over the results of the recently ordered labs, tests and studies. He is here by himself.      He previously had completed chemotherapy and  XRT.  He seems to have tolerated the regimen fairly well .     He saw Dr James with ENT 2024 with DL with good report per patient. He sees him again in 3 months      He saw Dr Patterson on 2024    He had PEt on 2024     He is doing well and is active at home.      He is breathing ok. No HA's or CP; no pain at this time      He previously saw Dr Lucero with Rad/onc, and Dr James and his case was presented to H&N Tumor Board at 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 He had his portacath removed on  and replaced on 2023 by Dr Le            Discussed covid19 and he has been vaccinated      ROS:   GEN: normal without any fever, night sweats or weight loss; doing well with tube feeds   HEENT: normal with no HA's, sore throat, stiff neck, changes in vision  CV: normal with no CP, SOB, PND, no currentDOE  PULM: normal with no SOB, cough, hemoptysis, sputum or pleuritic pain  GI: no current N/V  ; has peg tube;    : normal with no hematuria, dysuria  BREAST: normal with no mass, discharge, pain  SKIN: normal  with no rash, erythema, bruising, or swelling     Pain Scale:  0    Allergies:  Review of patient's allergies indicates:   Allergen Reactions    Lovastatin Itching    Pollen extracts     Lovastatin Rash     Not confirmed but pt skeptical       Medications:    Current Outpatient Medications:     acetaminophen (TYLENOL) 325 MG tablet, Take 325 mg by mouth every 6 (six) hours as needed for Pain., Disp: , Rfl:     calcitrioL (ROCALTROL) 1 mcg/mL solution, Give 0.25 mLs (0.25 mcg total) by Per G Tube route once daily., Disp: 15 mL, Rfl: 3    calcium carbonate 500 mg/5 mL (1,250 mg/5 mL), Take 20 mls four times daily with meals and nightly., Disp: 2300 mL, Rfl: 12    diclofenac sodium (VOLTAREN ARTHRITIS PAIN) 1 % Gel, Apply 2 g topically once daily. Apply couple of times a day on the affected arthritis pain., Disp: 100 g, Rfl: 1    esomeprazole (NEXIUM) 40 MG capsule, 40 mg before breakfast., Disp: , Rfl:     ibuprofen (ADVIL,MOTRIN) 200 MG tablet, Take 200 mg by mouth every 6 (six) hours as needed for Pain., Disp: , Rfl:     lactose-reduced food with fibr (JEVITY 1.5 TRISHA) 0.06 gram-1.5 kcal/mL Liqd, 6 cartons per day via peg.  Flush 60ml before and after each bolus, Disp: 180 each, Rfl: 11    levothyroxine (SYNTHROID) 100 MCG tablet, 1 tablet (100 mcg total) by Per G Tube route before breakfast., Disp: 30 tablet, Rfl: 11    scopolamine (TRANSDERM-SCOP) 1.3-1.5 mg (1 mg over 3 days), Place 1 patch onto the skin every 72 hours., Disp: 24 patch, Rfl: 3    famotidine (PEPCID) 40 mg/5 mL (8 mg/mL) suspension, Give 2.5 mLs (20 mg total) by Per G Tube route 2 (two) times daily. (Patient not taking: Reported on 2/6/2024), Disp: 50 mL, Rfl: 11    PMHx/PSHx Updates:  See patient's last visit with me on 11/15/2023  See H&P on 9/23/2022        Pathology:   Cancer Staging   Larynx cancer  Staging form: Larynx - Glottis, AJCC 8th Edition  - Clinical stage from 8/6/2020: Stage II (cT2, cN0, cM0) - Signed by Fariha Muñoz NP on  "8/6/2020  - Pathologic stage from 1/20/2022: Stage LOU (pT4a, pN0, cM0) - Signed by Fariha Muñoz NP on 1/20/2022  Staging form: Pharynx - P16 Negative Oropharynx, AJCC 8th Edition  - Clinical: Stage II (rcT2, cN0, cM0) - Signed by Matheus Portillo Jr., MD on 5/30/2022    Malignant neoplasm of base of tongue  Staging form: Pharynx - P16 Negative Oropharynx, AJCC 8th Edition  - Clinical stage from 5/20/2022: Stage II (cT2, cN0, cM0, p16-) - Signed by Saúl Kessler MD on 7/7/2022    Dissection 11/9/2022:    Final Pathologic Diagnosis 1. "left submandibular lymph node", dissection:       - Three lymph nodes, negative for carcinoma (0/3)   2. 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       - Tumor size: 4.5 cm       - Resection margins: left superior pharyngeal margin focally involved;   all other margins are negative for carcinoma       - Left internal jugular vein: free of tumor       - One lymph node, negative for carcinoma (0/1)   Note: P16 immunostain is negative     Tumor Site       Oropharynx  involving Base of tongue       Tumor Laterality       Midline       Tumor Size       Greatest Dimension (Centimeters) 4.5 cm         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)       Histologic Grade       G2 to G3: Moderate to Poorly differentiated       Lymphovascular Invasion       Not identified       Perineural Invasion       Not identified     MARGINS      Margins       Involved by invasive tumor     Margin(s)   Involved by Invasive Tumor:      left superior pharyngeal margin; all other   margins are free of tumor     LYMPH NODES    Regional Lymph Node Status:    :     Regional Lymph Node   Status:      All regional lymph nodes negative for tumor      pT Category:               pT3   pN Category:               pN0      Base of Tongue Biopsy  4/27/2022:  Final Pathologic Diagnosis BIOPSY " OF BASE OF THE TONGUE:   THE INFILTRATING POORLY DIFFERENTIATED SQUAMOUS CELL CARCINOMA   THE TUMOR IS P16 NEGATIVE.  THE POSITIVE AND NEGATIVE CONTROLS STAINED   APPROPRIATELY      Larynx resection/thyroidectomy 1/6/2022:     Final Pathologic Diagnosis 1. Lymph nodes, left neck levels 2,  3 and 4, dissection:   - Nineteen lymph nodes, all  negative  for metastatic carcinoma (0/19)   2. Lymph nodes, right neck levels 2,  3 and 4, dissection:   - Twenty-eight lymph nodes, all  negative  for metastatic carcinoma (0/28)   3. Possible parathyroid gland, excision:   - Benign parathyroid gland tissue (0.003 g)   4. 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 Soft tissue, posterior tracheal margin, excision:   - Benign, focally reactive respiratory mucosa with submucosal acute   inflammation,  negative  for dysplasia or malignancy   6. Soft tissue, anterior tracheal margin, excision:   - Benign respiratory mucosa with subepithelial cartilage,  negative  for   dysplasia or malignancy   7. Soft tissue, posterior tracheal margin #2, excision:   - Benign, focally reactive respiratory mucosa with submucosal acute   inflammation,  negative  for dysplasia or malignancy   8. Soft tissue, anterior tracheal margin #2, excision:   - Benign, reactive focally ulcerated respiratory mucosa with submucosal acute   inflammation,  negative  for dysplasia or malignancy             Laryngoscope 8/4/2020: (with Dr Noel):  Final Pathologic Diagnosis 1.  Left true vocal fold,  "biopsy:       -  Invasive moderately differentiated squamous cell carcinoma,   keratinizing type   2.  Left true vocal fold, biopsy:       -  Invasive moderately differentiated squamous cell carcinoma,   keratinizing type             Laryngoscope 3/17/2020 (with Dr Xiao):  Final Pathologic Diagnosis 1.  BIOPSY OF LEFT TRUE VOCAL CORD:   SEVERELY DYSPLASTIC APPEARING SQUAMOUS MUCOSA   INVASIVE CARCINOMA IS NOT DOCUMENTED   ON THE OTHER HAND, THESE FRAGMENTS ARE NODUL AND WITHOUT SUBMUCOSA FOR   EVALUATION; IT IS POSSIBLE THAT THEY REFLECT INVASIVE SQUAMOUS CARCINOMA   2.  BIOPSY OF LEFT ANTERIOR COMMISSURE:   MODERATE DYSPLASIA   NO INVASIVE CARCINOMA IDENTIFIED             Objective:     Vitals:  Blood pressure 113/65, pulse 75, temperature 97 °F (36.1 °C), resp. rate 16, height 5' 6" (1.676 m), weight 67.1 kg (148 lb).    Physical Examination:   GEN: no apparent distress, comfortable; AAOx3  HEAD: atraumatic and normocephalic  EYES: no pallor, no icterus, PERRLA  ENT: OMM, no pharyngeal erythema, external ears WNL; no nasal discharge; no thrush; s/p laryngectomy with flap since healed well; trach   NECK: no masses, thyroid normal, trachea midline, no LAD/LN's, supple; post-op dissection healed well;    CV: RRR with no murmur; normal pulse; normal S1 and S2; no pedal edema; portacath   CHEST: Normal respiratory effort; CTAB; normal breath sounds; no wheeze or crackles  ABDOM: nontender and nondistended; soft; normal bowel sounds; no rebound/guarding; peg tube  MUSC/Skeletal: ROM normal; no crepitus; joints normal; no deformities or arthropathy  EXTREM: no clubbing, cyanosis, inflammation or swelling  SKIN: no rashes, lesions, ulcers, petechiae or subcutaneous nodules  : no mahan  NEURO: grossly intact; motor/sensory WNL; AAOx3; no tremors  PSYCH: normal mood, affect and behavior  LYMPH: normal cervical, supraclavicular, axillary and groin LN's          Labs:     Lab Results   Component Value Date    WBC 5.19 " 12/18/2023    HGB 13.0 (L) 12/18/2023    HCT 37.5 (L) 12/18/2023    MCV 96 12/18/2023     (L) 12/18/2023     CMP  Sodium   Date Value Ref Range Status   01/09/2024 138 136 - 145 mmol/L Final     Potassium   Date Value Ref Range Status   01/09/2024 3.9 3.5 - 5.1 mmol/L Final     Chloride   Date Value Ref Range Status   01/09/2024 102 95 - 110 mmol/L Final     CO2   Date Value Ref Range Status   01/09/2024 24 23 - 29 mmol/L Final     Glucose   Date Value Ref Range Status   01/09/2024 133 (H) 70 - 110 mg/dL Final     BUN   Date Value Ref Range Status   01/09/2024 21 8 - 23 mg/dL Final     Creatinine   Date Value Ref Range Status   01/09/2024 1.3 0.5 - 1.4 mg/dL Final     Calcium   Date Value Ref Range Status   01/09/2024 9.1 8.7 - 10.5 mg/dL Final     Total Protein   Date Value Ref Range Status   01/09/2024 7.3 6.0 - 8.4 g/dL Final     Albumin   Date Value Ref Range Status   01/09/2024 4.1 3.5 - 5.2 g/dL Final   11/18/2020 3.7 3.6 - 5.1 g/dL Final     Comment:     For additional information, please refer to   http://education.cashcloud/faq/FUY267 (This link is   being provided for informational/ educational purposes only.)  This test was developed and its analytical performance   characteristics have been determined by [facility]. It has not been cleared or approved by the   [place] Food and Drug Administration. This assay has been validated   pursuant to the CLIA regulations and is used for clinical   purposes.  @ Test Performed By:  [employer]  Yo Cortes M.D.,   [address] [place] [id number]  CLIA  [id number]       Total Bilirubin   Date Value Ref Range Status   01/09/2024 1.4 (H) 0.1 - 1.0 mg/dL Final     Comment:     For infants and newborns, interpretation of results should be based  on gestational age, weight and in agreement with clinical  observations.    Premature Infant recommended reference  ranges:  Up to 24 hours.............<8.0 mg/dL  Up to 48 hours............<12.0 mg/dL  3-5 days..................<15.0 mg/dL  6-29 days.................<15.0 mg/dL       Alkaline Phosphatase   Date Value Ref Range Status   01/09/2024 145 (H) 55 - 135 U/L Final     AST   Date Value Ref Range Status   01/09/2024 31 10 - 40 U/L Final     ALT   Date Value Ref Range Status   01/09/2024 32 10 - 44 U/L Final     Anion Gap   Date Value Ref Range Status   01/09/2024 12 8 - 16 mmol/L Final     eGFR if    Date Value Ref Range Status   07/29/2022 >60.0 >60 mL/min/1.73 m^2 Final     eGFR if non    Date Value Ref Range Status   07/29/2022 >60.0 >60 mL/min/1.73 m^2 Final     Comment:     Calculation used to obtain the estimated glomerular filtration  rate (eGFR) is the CKD-EPI equation.                   Radiology/Diagnostic Studies:      PET 2/7/2024:  IMPRESSION:     Extensive postoperative changes of the neck as outlined above, stable compared to prior PET/CT.  No convincing evidence of residual/recurrent FDG avid malignancy.     Mild FDG avidity of the proximal thoracic esophagus near the operative site, decreased compared to prior.     FDG avidity of arthritic right sternoclavicular joint          Ct Chest/Neck 9/21/2023:    IMPRESSION:     1. Extensive postsurgical changes throughout the neck as described, with no evidence of residual or recurrent malignancy.  2. Negative for metastatic disease throughout the neck or the chest.  3. 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  4. Coronary artery calcifications.        PET 5/30/2023:    IMPRESSION: Postsurgical changes from total glossectomy, laryngectomy and pharyngectomy with bilateral neck dissections     There is resolution of the previously noted fluid collections within the neck. There is mild FDG activity in the left side the neck adjacent to the neoesophagus as well as at the tracheostomy site to the right of the  neoesophagus. Findings most likely are secondary to postsurgical and postinfection changes. There is no focal mass or adenopathy     No evidence of distant metastasis             CTA Neck    Result Date: 9/20/2022  EXAMINATION: CTA NECK CLINICAL HISTORY: Head/neck cancer, monitor;Malignant neoplasm of base of tongue TECHNIQUE: CT angiogram was performed from the level of the scott to the EAC following the IV administration of 75mL of Omnipaque 350.   Sagittal and coronal reconstructions and maximum intensity projection reconstructions were performed. Arterial stenosis percentages are based on NASCET measurement criteria. COMPARISON: CTA neck dated 05/20/2022 FINDINGS: Aortic arch and great vessels: There is a conventional left-sided 3 vessel arch.  The aortic arch is widely patent.  There is stable soft and calcified plaque in the proximal left subclavian artery with a similar appearance the prior study and possibly within radiation field but without critical stenosis or occlusion.  The right subclavian artery is patent.  The brachiocephalic trunk and origin of the left common carotid artery are patent. Carotid arteries Right carotid artery: There is calcified plaque scattered in the right common carotid artery without critical stenosis, occlusion or change.  There is no measurable stenosis of the internal carotid artery which is patent to the skull base. Left carotid artery: There is mild plaque scattered in the left common carotid artery without change and without critical stenosis or occlusion.  There is no measurable stenosis of the internal carotid artery. Vertebral arteries: The left vertebral artery is dominant and patent throughout its course in the neck.  The right vertebral artery is hypoplastic but patent.  There is no critical stenosis, occlusion, thrombus or dissection.  There is no change. The study extends intracranially.  There is calcified plaque in the V4 segment of the left vertebral artery  resulting in a moderate to marked short segment nonocclusive stenosis.  The right vertebral artery partially terminates in a posteroinferior cerebellar artery with a short segment moderate to marked stenosis of the very distal right vertebral artery.  Some flow within the distal right vertebral artery may represent retrograde flow from the dominant left vertebral artery.  The basilar artery is patent.  The origins of the superior cerebellar and posterior cerebral artery on the right are patent.  There is fetal origin of the left posterior cerebral artery which is patent. There is plaque in the cavernous segments of both internal carotid arteries but there is no critical stenosis or large vessel occlusion of the anterior circulation vessels.  There is no large aneurysm. Other: There is a similar appearance of the included upper lungs with pleural and parenchymal changes anteriorly in the upper lobes bilaterally.  As previously discussed, timing of the contrast bolus is performed during the arterial phase for CTA neck.  Therefore, enhancement of the soft tissues is somewhat suboptimal due to this technique. There has been a large region of soft tissue resection in the anterior mid and lower neck soft tissues with continued open defect in the upper chest and lower neck above the manubrium.  Soft tissue seen along the left posterolateral border of the trachea could represent secretions or mucus.  This was present previously. Redemonstrated is a large heterogeneously enhancing lesion centered at the level of the tongue base and floor of the mouth centrally into the left.  There is scattered calcifications.  The prior CTA demonstrated regions of central necrosis which are no longer definitely present but diffusely heterogeneous density and enhancement likely represents regions of necrosis.  This large heterogeneously enhancing soft tissue mass extends more anteriorly in the floor of the mouth on the left and measures at  least 5.6 cm in greatest anterior to posterior dimension with 3.6 cm transverse dimension.  This does extend anteriorly to the medial margin of the body of the mandible on the left. There are post treatment changes with stranding in the neck fat.     1. Similar appearance of the neck vessels when compared to the prior CTA neck with mild narrowing at the origin of the left subclavian artery.  There is no critical stenosis, occlusion, thrombosis or dissection involving the cervical vertebral or carotid arteries. 2. Larger mass within the hypopharynx and tongue base extending anteriorly to the floor of the mouth on the left to the medial margin of the body of the mandible without obvious bony destruction. 3. There is nonocclusive stenosis of the distal V4 segment of the left vertebral artery without change. 4. There is no large vessel occlusion or critical stenosis of the anterior circulation. 5. There is developmental variation with fetal origin of the left posterior cerebral artery. Electronically signed by: Rex Joyner MD Date:    09/20/2022 Time:    11:31    CT Abdomen Pelvis With Contrast    Result Date: 9/1/2022  CMS MANDATED QUALITY DATA - CT RADIATION - 436 All CT scans at this facility utilize dose modulation, iterative reconstruction, and/or weight based dosing when appropriate to reduce radiation dose to as low as reasonably achievable. Reason: abdominal pain partial history of laryngeal carcinoma TECHNIQUE: CT abdomen and pelvis with 100 mL Omnipaque 350. COMPARISON: PET/CT 5/13/2022 CT ABDOMEN: Coronary artery calcification and extremely tiny hiatal hernia incidentally noted. Visualized lung bases are clear. Liver, gallbladder, pancreas, spleen, adrenals, and kidneys are normal. Mild aortoiliac calcifications present. Gastrostomy tube tip lies in distal gastric body. Small intestines are unremarkable. Mild wall thickening affects the ascending colon with pneumatosis intestinalis diffusely affecting  the ascending colon. No other free intraperitoneal gas or, and remainder of colon is normal. A normal appendix is present. The major mesenteric vascular structures are patent. No acute osseous abnormality. CT PELVIS: Prostate is slightly enlarged. Bladder is normal. No free pelvic fluid. Tiny right and small to moderate left fat-containing inguinal hernias are present. No acute osseous abnormality. IMPRESSION: 1. Pneumatosis intestinalis affecting the ascending colon with associated mild wall thickening. Etiology of this is undetermined. Potential considerations include intestinal ischemia or pneumatosis related to chemotherapy. Clinical and laboratory correlation is needed to assess significance. No portal venous gas or free intraperitoneal air however. 2. Coronary artery calcifications Electronically signed by:  Nael Hui MD  9/1/2022 6:20 PM CDT Workstation: [id number]    NM PET CT Routine Skull to Mid Thigh    Result Date: 9/27/2022  PET CT WITH IMAGE FUSION HISTORY:  restage tongue cancer RESTAGING...  HX: TONGUE CA.  DX: April 2022.  LAST CHEMO TREATMENT WAS 3WKS AGO.  EVALUATE TX RESPONSE.   ALSO HX OF LARYNGEAL CA IN AUGUST 2020.  MULTIPLE SURGERIES: LARYNGECTOMY, THYROIDECTOMY & TRACHEOSTOMY.  RAD TX WAS COMPLETED IN NOV 2020. TECHNIQUE: Following IV administration of 11.2 mCi of F-18 labeled FDG into right antecubital fossa and a 60 minute delay, PET CT was performed from the vertex of the skull through the proximal thighs with an integrated PET CT scanner with image fusion. CT images were obtained to aid in attenuation correction and PET localization. The patient's serum glucose at the time of the exam was 136 mg/dL. COMPARISON: PET/CT 5/13/2022 FINDINGS: Poorly characterized soft tissue mass involving base of tongue approximately measures 4.3 x 2.8 cm (series 3 image 65) appearing similar to the prior exam. This reaches current max SUV of 11.8, not significantly changed from prior of 11.4. No other  abnormal FDG activity. Intracranial compartment is unremarkable. Trace bilateral maxillary sinus mucosal thickening is present. Postoperative changes of laryngectomy and tracheostomy are present. Surgical clips occur throughout the neck. No definite enlarged cervical or supraclavicular lymph node, with evaluation limited by lack of IV contrast. Left subclavian port catheter tip terminates in SVC. Diffuse coronary artery calcifications are present. No enlarged mediastinal or axillary lymph nodes. No pulmonary nodule or mass. Gastrostomy tube tip lies in gastric body. Moderate aortoiliac calcifications are present. Small fat-containing left inguinal hernia incidentally noted. Mild degenerative changes affect the spine. No suspicious osseous abnormality. IMPRESSION: 1. No significant change in the FDG avid mass involving the tongue base, remaining characteristic of malignancy. 2. No new FDG avid malignancy or metastatic disease. Electronically signed by:  Nael Hui MD  9/27/2022 2:38 PM CDT Workstation: [id number]    CT Abdomen Pelvis  Without Contrast    Result Date: 9/4/2022  CMS MANDATED QUALITY DATA - CT RADIATION  436 All CT scans at this facility utilize dose modulation, iterative reconstruction, and/or weight based dosing when appropriate to reduce radiation dose to as low as reasonably achievable. CT ABDOMEN PELVIS WITHOUT IV CONTRAST CLINICAL HISTORY: 72 years Male Follow-up pneumatosis COMPARISON: CT abdomen and pelvis September 1, 2022 FINDINGS: Imaging through the lower thorax demonstrates subsegmental atelectasis of the dependent lower lobes. Coronary artery calcification. Bone window images show no acute or aggressive osseous abnormality. Transitional lumbosacral vertebral body. On this unenhanced exam, no focal hepatic lesion. Gallbladder and biliary tree are unremarkable. Spleen appears normal. Pancreas is unremarkable. No adrenal lesion. No renal calculi or hydronephrosis. Ureters are normal in  caliber. Urinary bladder is within normal limits. Gastrostomy tube is in place within the stomach. Stomach is largely collapsed. No evidence of small bowel obstruction. Pneumatosis and pericolonic abscess involving the ascending colon is redemonstrated, slightly diminished compared to prior. Mild wall thickening throughout the colon. No free fluid or free air within the abdomen or pelvis. Aortoiliac atherosclerotic calcification. No pathologically enlarged lymph nodes within the abdomen or pelvis. Bilateral fat-containing inguinal hernias, larger on the left. IMPRESSION: Pneumatosis and pericolonic gas about the ascending colon has decreased in volume compared to prior. Persistent colonic wall thickening which could indicate colitis. Coronary and aortic atherosclerosis. Gastrostomy tube is within the stomach. Bilateral inguinal hernias. Electronically signed by:  Jose De Jesus Oleary MD  9/4/2022 4:06 PM CDT Workstation: [id number]    CTA neck  5/20/2022:     IMPRESSION:  Persistent mass within the hypopharynx consistent with malignancy.  By my measurements it is larger than on April 24, 2022 with central necrosis.  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 This is similar to the previous April 2022 study.  No evidence of arterial compromise related to malignancy.     PET 5/13/2022:     IMPRESSION:  1. Postoperative changes of prior laryngectomy and lymph node resection/radiation.  2. Masslike FDG avid lesion to the left of midline at the tongue base concerning for residual/recurrent disease.  3. Adjacent confluent nodular extension along the inferior left side of the mass.  4. No FDG avid lymphadenopathy or convincing additional sites of disease in the chest, abdomen or pelvis.     CT head 5/10/2022:  FINDINGS: Comparison to multiple prior exams. There is no acute intracranial hemorrhage, with no mass effect or abnormal extra-axial fluid. Mild scattered areas  of nonspecific hypoattenuation involve the deep periventricular white matter, with gray-white differentiation maintained.     There is mild generalized prominence of the cortical sulci and ventricles. The cerebellum and brainstem are unremarkable. There are carotid siphon vascular calcifications. The visualized paranasal sinuses and mastoid air cells are clear. There is no acute calvarial fracture or scalp hematoma.     IMPRESSION: No acute intracranial hemorrhage or acute calvarial fracture     CT soft neck 4/24/2022;     Oral tongue/tongue base:  At the base of the tongue on the left there are findings of a heterogeneously enhancing mass measuring 2.1 cm highly concerning for a neoplastic process.     True and false cords:  Patient status post laryngectomy which has been performed in the interim. Soft tissue stranding in the lower neck likely related to a previous flat reconstruction. No enhancement or lymphadenopathy within the lower neck.     Lymph node assessment:Negative     Surrounding soft tissues:  Negative     Vasculature:  Negative     Osseous structures:  Negative     Lung apices:  Scarring involving the lung apices bilaterally likely related to previous radiation.     IMPRESSION:  1. Postoperative and radiation changes involving the lower neck.  2. Findings highly concerning for a developing mass at the left base of the tongue enhancing 2.1 cm region. Direct visualization in this region would be of benefit.        CT soft neck  12/24/2021  IMPRESSION:  1.  Laryngeal mass as on prior exam. Laryngeal airway narrowing has not changed.  2.  No evidence for active hemorrhage.  3.  Partially visualized patchy groundglass infiltrates in both upper lobes. Also see CT chest report     CTA Chest 12/24/2021:  IMPRESSION:     1.  No pulmonary embolism.     2.  Soft tissue stranding in the region of the strap musculature with ill-defined hypodensity measuring 2.8 x 1.4 cm in the cervical midline.  Findings  concerning for infectious process or abscess. Neoplastic process could have similar imaging characteristics.     3.  Suggested erosion of the proximal right parasymphyseal aspect of the thyroid shield.  Correlated with CT soft tissue neck findings. Findings could represent osteomyelitis. Neoplastic process cannot be excluded.     4.  Hepatic steatosis.  5.  Thickening of the gastric wall. Prominence of perigastric vasculature. Small hiatal hernia.     6.  Moderate stool burden.  Correlate for constipation.        PET 12/15/2021:  IMPRESSION:     Substantial qualitative and quantitative increase of hypermetabolic FDG activity associated with the larynx in this patient with known supraglottic neoplasm.     No evidence of FDG avid metastatic disease involving neck, chest, abdomen or pelvis.     PET 8/21/2020:     Impression:     1. Intensely hypermetabolic plaque-like mass along the left true vocal cord, compatible with known laryngeal carcinoma.  2. No findings of regional metastatic disease in the neck, or distant metastatic disease.        CT Chest 8/10/2020:     IMPRESSION:     No metastatic disease within the chest.  Three-vessel coronary artery calcification. Nondilated cardiac  chambers     CT Soft neck 7/22/2020:  IMPRESSION:  1. Slight interval increase in size of the previously described  enhancing nodule along the anterior left vocal cord.  2. No pathologic lymphadenopathy.  3. Additional and incidental findings as noted above.     CT Soft neck  3/16/2020:     Impression:     6 mm enhancing focus involving the superior aspect of the left focal cord near the anterior commisure.  Recommend direct visualization for further evaluation of laryngeal polyp     Question of 2 mm submucosal lipoma involving the midportion of the superior aspect of the left vocal cord.     Mild arteriosclerosis involving the brachiocephalic arteries and carotid arteries     Mild degenerative change of the cervical spine        I have  reviewed all available lab results and radiology reports.    Assessment/Plan:   (1) 73 y.o. 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     - Patient has been under the care of Dr Khoobehi with [facility] and Dr Portillo with rad/onc.   - He was recently found to have a new primary cancer involving the base of his tongue in April 2022. He was deemed not to be a candidate for any further radiation and did not want to undergo any further surgery as this would necessitate a glossectomy procedure.   - He has been on adjuvant chemotherapy per direction of Dr Khoobehi and has had 3 cycles. His therapy course has been complicated with persistent diarrhea and N/V.      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 He saw Dr Lucero with Rad/onc this am. They are going to present his case to H&N Tumor Board at [facility] and plans to see Dr James about surgical options. If he is not a surgical candidate then will proceed with cisplatin and XRT combine therapy.     10/6/2022:  - He saw Dr Lucero with Rad/onc, and Dr James and his case was presented to H&N Tumor Board at [facility] and  he general consensus was to proceed to surgery.  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   - He had his portacath removed on 11/28.   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, keratinizing type, moderate to poorly differentiated tumor  - Four LN's were all negative  - he did have a positive left superior pharyngeal margin  - pT3 pN0  - reviewed the latest NCCN guidelines again from Version 1.2023; he may need some further systemic therapy due to the margin  - check on Rad/onc follow-up     1/23/2023:  - s/p new portacath placed on 1/12/2023 with Dr Le  - starting combined chemotherapy and XRT - cisplatin  - s/p chemotherapy school with [facility]    2/6/2023:  - he seems to be tolerating the chemotherapy well at this time  - continued with concomitant therapy with XRT    2/22/2023:  - he seems to be tolerating the chemotherapy well at this time  - continued with concomitant therapy with XRT  - labs relatively adequate  - will check on his refills    3/6/2023:  - finishing up XRT this comking Thursday  - last chemotherapy today    4/3/2023:  - He has since completed chemotherapy and  XRT.  He seems to have tolerated the regimen fairly well with no new complaints.  Some weight loss but reports he is staying hydrated. He does have some occasional GRIFFIN.   - He is seeing ENT tomorrow with Dr James    5/31/2023:  - He has since completed chemotherapy and  XRT.  He seems to have tolerated the regimen fairly well . He saw Dr James with ENT on 5/2/2023 and had repeat scope with good report per patient.  - He had recent PET yesterday on 5/30 which overall looks adequate    8/23/2023:  - sees Dr James again on 9/5/2023  - will set up repeat CT neck and chest for Sept 2023  - reach out to dietary about foods he can eat that are higher in iron  - consider IV iron x2 (he  can not swallow pills)  - see if we can renew PT    11/15/2023:  - He previously had completed chemotherapy and  XRT.  He seems to have tolerated the regimen fairly well . He saw Dr James with ENT on 11/72023 and had repeat scope with good report per patient.  - He last saw Dr Patterson on 8/3/2023 and sees him again in Jan 2024, Dr Portillo on 6/19/2023  - He had last PET on 5/30  and CT Chest/Neck in Sept 2023  - he missed one IV iron  - due for up to date labs    2/20/2024:  - He saw Dr James with ENT 2/6/2024 with DL with good report per patient. He sees him again in 3 months  - He saw Dr Patterson on 2/5/2024  - He had PEt on 2/7/2024   - some residual thrombocytopenia post-chemotherapy - monitor for now as this may take some time to recover      (2) Hx/of Laryngeal cancer in Aug 2020 stage II (cT2 N0)  - s/p radiation followed by bilateral neck dissection and total thyroidectomy     Brief Oncology Summary for his laryngeal CA hx:     Patient was noted to initially have a suspicious lesion on the left true vocal cord by laryngoscopy with Dr Xiao in March 2020 with biopsy showing severe dysplastic changes without definitive invasive process. He had a CT scan on 3/16/2020 which showed a 6mm nodule on the left vocal cord. A repeat Ct scan four months later on 7/22/2020 showed the nodule enlarged to 1.4 x 1.1 cm in size. Patient was then seen by Dr Noel and underwent repeat scope in Aug 2020 who found a bulky deeply invading tumor which was debulked and the pathology coming back moderately differentiated SCCA without lymphovascular or perineural invasion. Hs case was subsequently presented to the tumor board and the consensus was to proceed with radiation. He completed XRT on 10/30/2020 and proceeded with regular laryngoscopy surveillance. He eventually required salvage laryngectomy and neck dissection with flap with Dr James on Jan 6th 2022. Pathology from the resection showed a 4.2cm Invasive, well to moderately  differentiated, keratinizing squamous cell carcinoma which was invading the left lobe of the thyroid. Fifty lymph nodes were removed which were all negative. Pathology TNM was pT4a, pN0. Patient did not require any adjuvant therapy afterwards.      (2) HTN and hypercholesterolemia     (3) Paroxysmal atrial fibrillation, V tach     (4) DM II     (5) B12 deficiency      (6) Fatty liver disease, GERD           VISIT DIAGNOSES:      Malignant neoplasm of base of tongue    Laryngeal cancer    Iron deficiency anemia due to chronic blood loss    Iron deficiency anemia, unspecified iron deficiency anemia type    Vitamin B12 deficiency    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    Larynx cancer    Tracheostomy in place    S/P laryngectomy          PLAN:  continue with ENT and rad/onc f/u as directed by them respectively - sees Dr James again in 3 months - expect he wwill need chest/neck CT again at same time  Continue to monitor labs and resume IV iron as needed   s/p chemotherapy school with [facility] - discussed the side-effect profile, provided literature and obtained consents  F/u Rad/onc follow-up as directed  Check labs monthly for now  F/u with PCP, ENT, etc  Dietician f/u on the tube feeds - will ask GI to see about getting peg tube exchange     RTC in 12 weeks with myself  Fax note to  Bandar/Dameon Lucero Hasney, Yesenia Gomez       Discussion:     COVID-19 Discussion:     I had long discussion with patient and any applicable family about the COVID-19 coronavirus epidemic and the recommended precautions with regard to cancer and/or hematology patients. I have re-iterated the CDC recommendations for adequate hand washing, use of hand -like products, and coughing into elbow, etc. In addition, especially for our patients who are on chemotherapy and/or our otherwise immunocompromised patients, I have recommended avoidance of crowds, including movie theaters, restaurants, churches, etc. I have recommended avoidance  "of any sick or symptomatic family members and/or friends. I have also recommended avoidance of any raw and unwashed food products, and general avoidance of food items that have not been prepared by themselves. The patient has been asked to call us immediately with any symptom developments, issues, questions or other general concerns.         Pathology Discussion:     I reviewed and discussed the pathology report(s) and radiograph reports (if available) in as simple to understand and/or laymen's terms to the best of my ability. I had an indepth conversation with the patient and went over the patient's individual diagnosis based on the information that was currently available. I discussed the TNM staging process with regard to the patient's particular cancer type, and the calculated stage based on the currently available TNM data and literature. I discussed the available prognostic data with regard to the current staging information and how it relates to the prognosis of their particular neoplastic process.          NCCN Guidelines:     I discussed the available treatment option(s) in accordance with the latest literature from the NCCN Clinical Practice Guidelines for the patient's particular type of cancer disorder. The NCCN Guidelines provide a "document evidence-based (and) consensus-driven management" of the care of oncology patients. The treatment recommendations were made not only in accordance to the NCCN guidelines, but also factored in to account the patient's overall age, condition, performance status and their medical co-morbidities. I went over the risks and benefits of the the treatment options (if any could be made) with regard to their particular cancer type, their cancer stage, their age, and their co-morbidities.         Chemotherapy Discussion:      I discussed the available treatment option(s) in accordance with the latest/current national evidence-based guidelines (NCCN, UpToDate, NCI, ASCO, etc " where applicable), their overall age/condition and their co-morbidities. I also went over the risks and benefits of the chemotherapy with regard to their particular cancer type, their cancer stage, their age/condition, and their co-morbidities. I provided literature on the chemotherapy regimen and discussed the chemotherapy side-effect profiles of the drug(s). I discussed the importance of compliance with obtaining and monitoring weekly lab work, and went over the potential hematopathology issues and risks with anemia, leucopenia and thrombocytopenia that can occur with chemotherapy. I discussed the potential risks of liver and kidney damage, which could be permanent and could necessitate dialysis long-term if kidney failure developed. I discussed the emetic and/or diarrheal potential of the regimen and the potential need for use of antiemetic and anti-diarrheal medications. I discussed the risk for development of anaphylactic shock, bronchospasm, dysrhythmia, and respiratory/cardiovascular arrest and/or failure. I discussed the potential risks for development of alopecia, cold sensory issues, ringing in ears, vertigo, cataracts, glaucoma, and neuropathy, all of which could end up being chronic and life-long. Some chemotherpyI discussed the risks of hand-foot syndrome and rashes, and development of other autoimmune mediated processes such as pneumonitis, hepatitis, and colitis which could be life threatening. I discussed the risks of the potential development of a rare but fatal viral mediated disease known as PML (Progressive Multifocal Leukoencephalopathy), and risk of future development of leukemia and/or lymphoma from use of certain chemotherapy agents. I discussed the need for neutropenic precautions, basic hygiene/sanitation behaviors and dietary restrictions.    The patient's consent has been obtained to proceed with the chemotherapy.The patient will be referred to Chemotherapy School /[facility] for  training and education on chemotherapy, use of antiemetics and/or anti-diarrheals, use of NSAID's, potential chemotherapy side-effects, and any specific recommendations and precautions with the particular chemotherapy agents.      I answered all of the patient's (and family's, if applicable) questions to the best of my ability and to their complete satisfaction. The patient acknowledged full understanding of the risks, recommendations and plan(s).     I have explained and the patient understands all of  the current recommendation(s). I have answered all of their questions to the best of my ability and to their complete satisfaction.         I spent over 25 mins of time with the patient. Reviewed results of the recently ordered labs, tests and studies; made directives with regards to the results. Over half of this time was spent couseling and coordinating care.    I have explained all of the above in detail and the patient understands all of the current recommendation(s). I have answered all of their questions to the best of my ability and to their complete satisfaction.   The patient is to continue with the current management plan.            Electronically signed by Venu Tamez MD              Answers submitted by the patient for this visit:  Review of Systems Questionnaire (Submitted on 2/16/2024)  appetite change : No  unexpected weight change: No  mouth sores: No  visual disturbance: No  cough: No  shortness of breath: No  chest pain: No  abdominal pain: No  diarrhea: No  frequency: No  back pain: No  rash: No  headaches: No  adenopathy: No  nervous/ anxious: No

## 2024-02-22 ENCOUNTER — CLINICAL SUPPORT (OUTPATIENT)
Dept: REHABILITATION | Facility: HOSPITAL | Age: 74
End: 2024-02-22
Payer: MEDICARE

## 2024-02-22 DIAGNOSIS — I89.0 LYMPHEDEMA DUE TO RADIATION: Primary | ICD-10-CM

## 2024-02-22 PROCEDURE — 97110 THERAPEUTIC EXERCISES: CPT | Mod: PO

## 2024-02-22 PROCEDURE — 97140 MANUAL THERAPY 1/> REGIONS: CPT | Mod: PO

## 2024-02-26 NOTE — PROGRESS NOTES
OccupationalTherapy Daily Treatment Note     Name: Cyrus Batres Jr.  Clinic Number: [id number]    Therapy Diagnosis:   Encounter Diagnosis   Name Primary?    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     Physician: Clay Lucero III, *    Visit Date: 2/22/2024     Physician Orders: Evaluation and treatment  Medical Diagnosis:     C32.9 (ICD-10-CM) - Larynx cancer      Evaluation Date: 12/26/2023  Insurance Authorization period Expiration: 12/26/2023-12/31/2024, 01/01/2024-12/31/2024  Plan of Care Certification Period: 12/26/2023-03/26/2024  FOTO 1:12/29/2023  FOTO 2:02/08/2024  FOTO 3:  Visit # / Visits Authortized: 12/ 40  Time In: 12:30  Time Out: 1:30  Total Billable Time: 60 minutes-Manual Therapy, Therapeutic Exercise     Precautions: Standard and cancer, Patient is Nonverbal    Subjective     Patient reports: That the area on both sides of his neck is very tight.  Functional change: none    Pain: 0/10  Location: neck     Objective     Response to previous treatment: The area below the chin is much softer and the area has visibly reduced below the chin.  The scar is more mobile.  There was more wrinkling of the skin noted. The jaw line is more palpable and the face is more narrow. He indicated that there is an area below his chin that is tight on both sides of the neck.  Treatment:   Nicolás received the following manual therapy techniques:- Manual Lymphatic Drainage and Soft tissue Mobilization were performed to the: face and neck for 45 minutes, including: Manual Lymph Drainage and short stretch compression bandaging     MANUAL LYMPHATIC DRAINAGE:  Drainage of the Right and Left upper quadrant from the axilla extending up to the neck, over the ear and to the side of the face to the jaw line.  While supine with head elevate,  Drainage provided from the front of the neck, B subclavian regions,  Across ant chest and top of shoulder,  drainage of the entire face with return to the axillas.     Kinesio tape was  applied to the neck in an I strip application with 50% tension from right to left to assist with breaking up scar tissue and to increase lymph uptake and direct lymph flow.      Nicolás received therapeutic exercises to develop cardiac output for 8 minutes including:   THERAPEUTIC EXERCISE- Patient performed Nustep therex for 8 min set at 3 resistance to increase cardiac output and increase lymph circulation.   THERAPEUTIC EXERCISES:  Continue Home Exercise Program of Active Range Of Motion, stretching, and postural correction.     Home Exercises Provided and Patient Education Provided     Education provided:      PATIENT/FAMILY Education: garment wearing schedule,  Home Exercise Program,  Beginning of self massage,  Self or assisted bandaging , and Risk reduction    KINESIO TAPE: Nicolás was educated on kinesio tape wearing schedule and to remove if having ill effects.  Nicolás verbalized understanding of the above education.      Written Home Exercises Provided: Nicolás demonstrated good  understanding of the education provided.        Assessment     He arrived to the session and the area below the chin was not as pronounced as it was on the evaluation.  He is using a chip bag to address the fibrotic fluid below the chin.  He tolerated Manual lymph drainage with out an issue.  He brought in the silicone gel pads that were recommended to be placed along the scar on the neck which is keeping fluid below his chin trapped but they are not staying in place due to the moist air coming out of the trach.  It was recommended that he back them away from the trach.  Kinesio tape is being used as a manual technique to increase lymph flow, direct lymph uptake and improve scar mobility.  The area below the chin was softer at the end of the session.  He has had a significant reduction of swelling in the face and below the chin. He is reporting tightness in the neck due to scar tissue.   Nicolás Is progressing well towards his goals.    Patient  prognosis is Good.     Patient will continue to benefit from skilled outpatient occupational therapy to address the deficits listed in the problem list box on initial evaluation, provide pt/family education and to maximize pt's level of independence in the home and community environment.     Patient 's spiritual, cultural and educational needs considered and patient agreeable to plan of care and goals.     Anticipated barriers to occupational therapy: non verbal    Goals    Short Term Goals: (4 weeks)  In Progress  Patient to be Independent and compliant with Home Exercise Program.  Met   Decrease girth in the face and neck by 6 cm for improved neck mobility.  Met   Patient will demonstrate 100% knowledge of lymphedema precautions and signs of infection.   Met   Patient will perform self-scar mobility techniques.  In Progress  Patient will perform self lymph drainage techniques.  In Progress  Patient will tolerate compression garment wearing schedule.     Long Term Goals: (12 weeks)  Met   Decrease girth in face and neck by 10 cm for improved neck mobility.  In Progress  Patient will show reduction in girth to mild or less with improved contour of face and neck.  In Progress  Patient to malinda/doff compression garment with daily compliance.       Plan      Patient to be seen 1-2 x per week for 90 days for the medically necessary treatments to include: decongestive massage- Manual lymphatic drainage, kinesio tape, scar massage, education in lymphedema precautions, self lymph drainage massage, and assistance in obtaining appropriate compression garment. Therapeutic exercise, postural correction, and progression of Home Exercise Program.       Thania Plata, OT, CLT

## 2024-02-29 ENCOUNTER — CLINICAL SUPPORT (OUTPATIENT)
Dept: REHABILITATION | Facility: HOSPITAL | Age: 74
End: 2024-02-29
Payer: MEDICARE

## 2024-02-29 ENCOUNTER — PATIENT MESSAGE (OUTPATIENT)
Dept: OPTOMETRY | Facility: CLINIC | Age: 74
End: 2024-02-29
Payer: MEDICARE

## 2024-02-29 ENCOUNTER — INFUSION (OUTPATIENT)
Dept: INFUSION THERAPY | Facility: HOSPITAL | Age: 74
End: 2024-02-29
Attending: INTERNAL MEDICINE
Payer: MEDICARE

## 2024-02-29 VITALS
OXYGEN SATURATION: 100 % | WEIGHT: 147.13 LBS | DIASTOLIC BLOOD PRESSURE: 72 MMHG | TEMPERATURE: 98 F | HEIGHT: 66 IN | HEART RATE: 62 BPM | SYSTOLIC BLOOD PRESSURE: 122 MMHG | BODY MASS INDEX: 23.65 KG/M2 | RESPIRATION RATE: 15 BRPM

## 2024-02-29 DIAGNOSIS — C32.9 LARYNX CANCER: Primary | ICD-10-CM

## 2024-02-29 DIAGNOSIS — E86.0 DEHYDRATION: ICD-10-CM

## 2024-02-29 DIAGNOSIS — C01 MALIGNANT NEOPLASM OF BASE OF TONGUE: ICD-10-CM

## 2024-02-29 DIAGNOSIS — I89.0 LYMPHEDEMA DUE TO RADIATION: Primary | ICD-10-CM

## 2024-02-29 PROCEDURE — A4216 STERILE WATER/SALINE, 10 ML: HCPCS | Performed by: INTERNAL MEDICINE

## 2024-02-29 PROCEDURE — 97110 THERAPEUTIC EXERCISES: CPT | Mod: PO

## 2024-02-29 PROCEDURE — 63600175 PHARM REV CODE 636 W HCPCS: Performed by: INTERNAL MEDICINE

## 2024-02-29 PROCEDURE — 25000003 PHARM REV CODE 250: Performed by: INTERNAL MEDICINE

## 2024-02-29 PROCEDURE — 96523 IRRIG DRUG DELIVERY DEVICE: CPT

## 2024-02-29 PROCEDURE — 97140 MANUAL THERAPY 1/> REGIONS: CPT | Mod: PO

## 2024-02-29 RX ORDER — SODIUM CHLORIDE 0.9 % (FLUSH) 0.9 %
10 SYRINGE (ML) INJECTION
Status: CANCELLED | OUTPATIENT
Start: 2024-02-29

## 2024-02-29 RX ORDER — HEPARIN 100 UNIT/ML
500 SYRINGE INTRAVENOUS
Status: CANCELLED | OUTPATIENT
Start: 2024-02-29

## 2024-02-29 RX ORDER — HEPARIN 100 UNIT/ML
500 SYRINGE INTRAVENOUS
Status: DISCONTINUED | OUTPATIENT
Start: 2024-02-29 | End: 2024-02-29 | Stop reason: HOSPADM

## 2024-02-29 RX ORDER — SODIUM CHLORIDE 0.9 % (FLUSH) 0.9 %
10 SYRINGE (ML) INJECTION
Status: DISCONTINUED | OUTPATIENT
Start: 2024-02-29 | End: 2024-02-29 | Stop reason: HOSPADM

## 2024-02-29 RX ADMIN — SODIUM CHLORIDE, PRESERVATIVE FREE 10 ML: 5 INJECTION INTRAVENOUS at 03:02

## 2024-02-29 RX ADMIN — HEPARIN 500 UNITS: 100 SYRINGE at 03:02

## 2024-02-29 NOTE — PLAN OF CARE
Ongoing, progressing.    Speech Therapy      Visit Type: Evaluation  -  Clinical swallow and communication/cognition  Treatment Diagnosis: Cognitive Communication Deficit    Precautions     - Medical precautions:  COVID-19 status: Negative test <72 hours (2/10/21)  PPE donned per current department policy: gown/gloves, N95 mask, face shield.   Aerosolizing events during session?: None  Because this patient has a negative COVID-19 test within the past 72 hours, door to room was not closed, AGP precautions not indicated.    - Oxygen: room air.      - Lines in chart and on patient reviewed; cautions maintained through out session    Current Diet: general and thin liquids      SUBJECTIVE                                                                                                             RN Rustam authorized session, reports no concerns re swallowing. Ms. Crouch was seated upright on bed without back support, just finished breakfast, reports no swallow concerns. States \"since my stroke, liquids will slip down the wrong way sometimes.\" States frequency of this is weekly. Reviewed chart, patient has had multiple ED presentations for SOB, typically along with chest pain, however CXR always clear and no signs of infection or mention of concern for aspiration. Ms. Crouch denies difficulty with breakfast this morning, denies any COM/COG complaints today though reports hallucinations yesterday. Reports poor sleep last night.    Patient/family goals: maximize function   Patient has not been hospitalized, in a skilled nursing facility, or seen by home health in the last 30 days.    OBJECTIVE                                                                                                                 Communication/Cognition:  Orientation Level:  Oriented to person  Oriented to place  Oriented to date    Observation: Awake but fatigued, asking to rest uninterrupted following completion of session.   Behavior/Social Interaction:  cooperates with  tasks mild impairment (75-89% accuracy) maintains eye contact mild impairment (75-89% accuracy) pragmatics mild impairment (75-89% accuracy) appropriate behavior mild impairment (75-89% accuracy) emotional lability intact (>90% accuracy) affect mild impairment (75-89% accuracy) frustration level mild impairment (75-89% accuracy)  Expression-Verbal: No impairment  Naming:  No impairment  Attention:  alternating attention intact (>90% accuracy) divided attention intact (>90% accuracy) selective attention intact (>90% accuracy)  sustained attention mild impairment (75-89% accuracy)    Swallowing   Oral motor exam unremarkable. Natural dentition intact and appear in good repair.    High temp last 24 hours: 99F  Lung sounds per Dr. Toure on 2/11/21: \"Clear to auscultation bilaterally, respirations unlabored.\"  No CXR from this admission.  No leukocytosis today.    Consistencies:  Thin Liquid (straw):     - Oral: intact   - Pharyngeal: intact  Dysphagia 3/Easy Chew:    - Oral: intact   - Pharyngeal: intact    Esophageal symptoms: not present. No pain associated with swallow.          Test and Outcome Measures  [place] Cognitive Assessment (MoCA)   Is designed as a screening assessment for detecting cognitive impairment, normed for persons age 55-85 years old. The MoCA assesses the cognitive domains of attention and concentration, executive functions, memory, language, visuoconstructional skills, conceptual thinking, calculations, and orientation.     (Version 8.1 presented)  Visuospatial 3/5 - lines missing from cube copy, incorrect clock hands (not from center, same length, pointing to 11:05 instead of 11:10)  Naming 3/3  Attention 3/6 - refused to attempt serial 7s, states she is bad at math  Language 1/3 - 1/2 for sentence repetition due to omitted and replaced words suspect d/t attention, only 7 words in 1 min  Abstraction 2/2  Delayed Recall 4/5  Orientation 6/6  Education point NA, reports GED + some college  classes  Score 22/30  Education point: Add 1 point if the patient has completed 12 or fewer years of education  Interpretation: a score greater than or equal to 26 is considered within normal limits, 18-25 mild cognitive impairment, 10-17 moderate cognitive impairment, <10 severe cognitive impairment.    Memory Index Score (MIS) = 14/15 For memory deficits due to retrieval failures, performance can be improved with a cue. For memory deficits due to encoding failures, performance does not improve with a cue.     ASSESSMENT                                                                                                   • Impairments: problem solving/executive function  • Functional Limitations: communication/cognition  • Skilled therapy is required to address these limitations in attempt to maximize the patient's independence.  • Ms. Crouch was seen on [facility] for clinical swallow evaluation and COM/COG evaluation per stroke protocol. Ms. Crouch was admitted to [facility] with complaint of left side deficits, neuroimaging negative thus far. She was seen in January 2018 by this service and at that time recommended general/thin. She has a relevant history of stroke (2014), current ETOH with h/o withdrawal seizures, smoking, bipolar disorder, GERD, and prior h/o cocaine use. RN reports patient is not currently in ETOH withdrawal.     SWALLOW: See details of clinical swallow evaluation in Objective section above. Overall, Ms. Crouch demonstrates functional oropharyngeal swallow. Recommend continue general/thin diet at this time, no further indication for skilled speech therapy to address swallow. Please consider GI consult given patient report of unintentional 100lb weight loss and frequent vomiting, which has also prompted ED visits per chart review. Patient has h/o GERD and has also had ED presentations with c/o atypical chest pain.     COM/COG: Ms. Crouch lives with her 13yo and 17yo sons and is on disability. Reports  education as GED with some college classes. She does not drive, relies on family/friends for transportation. She receives support from  via [employer] [facility], states her name is Ebony but doesn't know her number. Completed MOCA 8.1 with score of 22/30, which indicates mild cognitive impairment (score of 26 and above is considered WNL). Of note, patient not fully engaged in session and achieves this score while braiding hair, alternating attention to NA (asked NA to come in and do vitals during eval so as not to disturb her later), TV on. At one point states \"sorry, I wasn't paying attention.\" She also reports fatigue from poor sleep last night. Began supplemental tasks to further determine if patient at baseline. Able to complete simple money/time math word problems requiring similar calculations to serial sevens which she refused to complete; patient able to do this with 100% accuracy given repetition, which she requested. At this point, patient's phone rang and she states \"It's my , who's in senior living, can we continue this later?\" Session ended per patient request. Given patient's social and medical history, negative neuroimaging, and formal and informal evaluation today, highly suspect patient is at baseline. However, did not fully complete evaluation of higher level problem solving for med management, finances, etc; if patient remains in hospital speech therapy will follow for an additional session to make sure no needs. If discharges, can offer OP speech therapy for the same, which I suspect patient would decline given she has no complaints.     On this date, the patient was educated on importance of oral cares in prevention of aspiration pneumonia particularly given reports of intermittent \"liquids sliding down the wrong way\" (weekly occurrence).    The response to education was: Verbalizes understanding    Requires SLP Follow Up: No    Discharge Recommendations           SLP Identified  Barriers to Discharge: none   Recommendations for Discharge: SLP: can offer OP COM/COG however suspect pt at/near baseline       Patient at end of session:    - location: in bed    - hand off to: nurse    PLAN                                                                                                                             Complete COM/COG assessment and determine DC needs. Assess med management, finances, higher level problem solving (suggest scheduling/planning task).     Frequency: MW cc (2/12)  Interventions:  Communication/cognition therapy and patient/family education    Plan/Goal Agreement:  Patient agrees with goals and treatment plan    RECOMMENDATIONS     -Diet:          *general and thin liquids    -Medication Administration:         *whole and with liquids    -Feeding Guidelines:          *sit up for 30 minutes after meal, feeds self and sit fully upright for all po intake    -Speech Reviewed Swallow:         *with clinical caregivers and with patient/family    GOALS                                                                                                                      Long Term Goals:   Pending completion of COM/COG assessment  Documented in the chart in the following areas: prior living flowsheet Pain. Assessment. Patient education flowsheet      Admitting diagnosis: Paresthesia (R20.2);Alcohol withdrawal syndrome without complication (CMS/[facility]) (F10.230)    Co-morbidities and problem list:  Patient Active Problem List:   Chronic chest pain   Essential hypertension    PFO (patent foramen ovale)   Pain disorder   Wound of right breast   AURELIO (generalized anxiety disorder)   Anticoagulation, Warfarin for hx CVAs   Hidradenitis suppurativa   Chronic bilateral low back pain without sciatica   Anemia   History of TTP (thrombotic thrombocytopenic purpura)   Abnormal uterine bleeding   Type 2 diabetes mellitus with diabetic polyneuropathy, without long-term current use of insulin (CMS/HCC)    Obstructive sleep apnea   Morbid obesity due to excess calories (CMS/[facility])   Gastroesophageal reflux disease   Vitamin D deficiency   Diabetic polyneuropathy associated with type 2 diabetes mellitus (CMS/[facility])   Bilateral carotid artery stenosis   Iron deficiency anemia due to chronic blood loss   Hyperlipidemia   Paresthesias   Alcohol abuse   History of major depression   Left knee pain   Urinary incontinence   Right sided weakness   Chronic headaches   Bipolar 2 disorder (CMS/[facility])   Tobacco use disorder   Coordination of complex care   Palpitations   PVC's (premature ventricular contractions)   Normal LVEF, 57% per Echo 11/25/2019   Encounter for therapeutic drug monitoring   Acute alcoholic gastritis   Non-intractable vomiting   History of ischemic multifocal multiple vascular territories stroke   Poorly Documented History of atrial fibrillation   Left sided numbness   Drug-seeking behavior    Treatment Diagnosis: Cognitive Communication Deficit    The referring provider's electronic signature on the evaluation authorizes the therapy plan of care and certifies the need for these services, furnished under this plan of care while under their care.

## 2024-03-01 NOTE — PROGRESS NOTES
OccupationalTherapy Daily Treatment Note     Name: Cyrus Batres Jr.  Clinic Number: [id number]    Therapy Diagnosis:   Encounter Diagnosis   Name Primary?    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  Functional change: none    Pain: 0/10  Location: neck     Objective     Response to previous treatment: The area below the chin is much softer and the area has visibly reduced below the chin.  The scar is more mobile.  There was more wrinkling of the skin noted. The jaw line is more palpable and the face is more narrow. He indicated that there is an area below his chin that is tight on both sides of the neck.  Treatment:   Nicolás received the following manual therapy techniques:- Manual Lymphatic Drainage and Soft tissue Mobilization were performed to the: face and neck for 45 minutes, including: Manual Lymph Drainage and short stretch compression bandaging     MANUAL LYMPHATIC DRAINAGE:  Drainage of the Right and Left upper quadrant from the axilla extending up to the neck, over the ear and to the side of the face to the jaw line.  While supine with head elevate,  Drainage provided from the front of the neck, B subclavian regions,  Across ant chest and top of shoulder,  drainage of the entire face with return to the axillas.     Kinesio tape was  applied to the neck in an I strip application with 50% tension from right to left to assist with breaking up scar tissue and to increase lymph uptake and direct lymph flow.      Nicolás received therapeutic exercises to develop cardiac output for 8 minutes including:   THERAPEUTIC EXERCISE- Patient performed Nustep therex for 8 min set at 3 resistance to increase cardiac output and increase lymph circulation.   THERAPEUTIC EXERCISES:  Continue Home Exercise Program of Active Range Of Motion, stretching, and postural correction.     Home Exercises Provided and Patient Education Provided     Education provided:      PATIENT/FAMILY Education: garment wearing schedule,  Home Exercise Program,  Beginning of self massage,  Self or assisted bandaging , and Risk reduction    KINESIO TAPE: Nicolás was educated on kinesio tape wearing schedule and to remove if having ill effects.  Nicolás verbalized understanding of the above education.      Written Home Exercises Provided: Nicolás demonstrated good  understanding of the education provided.        Assessment     He arrived to the session and the area below the chin was not as pronounced as it was on the evaluation.  He is using a chip bag to address the fibrotic fluid below the chin.  He tolerated Manual lymph drainage with out an issue.  He brought in the silicone gel pads that were recommended to be placed along the scar on the neck which is keeping fluid below his chin trapped but they are not staying in place due to the moist air coming out of the trach.  It was recommended that he back them away from the trach.  Kinesio tape is being used as a manual technique to increase lymph flow, direct lymph uptake and improve scar mobility.  The area below the chin was softer at the end of the session.  He has had a significant reduction of swelling in the face and below the chin. He is reporting tightness in the neck due to scar tissue.   Nicolás Is progressing well towards his goals.    Patient  prognosis is Good.     Patient will continue to benefit from skilled outpatient occupational therapy to address the deficits listed in the problem list box on initial evaluation, provide pt/family education and to maximize pt's level of independence in the home and community environment.     Patient 's spiritual, cultural and educational needs considered and patient agreeable to plan of care and goals.     Anticipated barriers to occupational therapy: non verbal    Goals    Short Term Goals: (4 weeks)  In Progress  Patient to be Independent and compliant with Home Exercise Program.  Met   Decrease girth in the face and neck by 6 cm for improved neck mobility.  Met   Patient will demonstrate 100% knowledge of lymphedema precautions and signs of infection.   Met   Patient will perform self-scar mobility techniques.  In Progress  Patient will perform self lymph drainage techniques.  In Progress  Patient will tolerate compression garment wearing schedule.     Long Term Goals: (12 weeks)  Met   Decrease girth in face and neck by 10 cm for improved neck mobility.  In Progress  Patient will show reduction in girth to mild or less with improved contour of face and neck.  In Progress  Patient to malinda/doff compression garment with daily compliance.       Plan      Patient to be seen 1-2 x per week for 90 days for the medically necessary treatments to include: decongestive massage- Manual lymphatic drainage, kinesio tape, scar massage, education in lymphedema precautions, self lymph drainage massage, and assistance in obtaining appropriate compression garment. Therapeutic exercise, postural correction, and progression of Home Exercise Program.       Thania Plata, OT, CLT

## 2024-03-04 ENCOUNTER — PATIENT MESSAGE (OUTPATIENT)
Dept: SPEECH THERAPY | Facility: HOSPITAL | Age: 74
End: 2024-03-04
Payer: MEDICARE

## 2024-03-07 ENCOUNTER — CLINICAL SUPPORT (OUTPATIENT)
Dept: REHABILITATION | Facility: HOSPITAL | Age: 74
End: 2024-03-07
Payer: MEDICARE

## 2024-03-07 DIAGNOSIS — I89.0 LYMPHEDEMA DUE TO RADIATION: Primary | ICD-10-CM

## 2024-03-07 PROCEDURE — 97140 MANUAL THERAPY 1/> REGIONS: CPT | Mod: PO

## 2024-03-07 PROCEDURE — 97110 THERAPEUTIC EXERCISES: CPT | Mod: PO

## 2024-03-10 NOTE — PROGRESS NOTES
OccupationalTherapy Daily Treatment Note     Name: Cyrus Batres Jr.  Clinic Number: [id number]    Therapy Diagnosis:   Encounter Diagnosis   Name Primary?    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  Functional change: none    Pain: 0/10  Location: neck     Objective     Response to previous treatment: The area below the chin is much softer and the area has visibly reduced below the chin.  The scar is more mobile.  There was more wrinkling of the skin noted. The jaw line is more palpable and the face is more narrow. He indicated that there is an area below his chin that is tight on both sides of the neck.  Treatment:   Nicolás received the following manual therapy techniques:- Manual Lymphatic Drainage and Soft tissue Mobilization were performed to the: face and neck for 45 minutes, including: Manual Lymph Drainage and short stretch compression bandaging     MANUAL LYMPHATIC DRAINAGE:  Drainage of the Right and Left upper quadrant from the axilla extending up to the neck, over the ear and to the side of the face to the jaw line.  While supine with head elevate,  Drainage provided from the front of the neck, B subclavian regions,  Across ant chest and top of shoulder,  drainage of the entire face with return to the axillas.     Nicolás received  therapeutic exercises to develop cardiac output for 8 minutes including:   THERAPEUTIC EXERCISE- Patient performed Nustep therex for 8 min set at 3 resistance to increase cardiac output and increase lymph circulation.   THERAPEUTIC EXERCISES:  Continue Home Exercise Program of Active Range Of Motion, stretching, and postural correction.    He was shown stretches using a sheet to hold down the shoulder while performing neck lateral flexion and neck lateral flexion with rotation right and left.        Home Exercises Provided and Patient Education Provided     Education provided:      PATIENT/FAMILY Education: garment wearing schedule,  Home Exercise Program,  Beginning of self massage,  Self or assisted bandaging , and Risk reduction    KINESIO TAPE: Nicolás was educated on kinesio tape wearing schedule and to remove if having ill effects.  Nicolás verbalized understanding of the above education.      Written Home Exercises Provided: Nicolás demonstrated good  understanding of the education provided.        Assessment     He arrived to the session and the area below the chin was not as pronounced as it was on the evaluation.  He is using a chip bag to address the fibrotic fluid below the chin.  He tolerated Manual lymph drainage with out an issue.  Kinesio tape is being used as a manual technique to increase lymph flow, direct lymph uptake and improve scar mobility.  The area below the chin was softer at the end of the session.  He has had a significant reduction of swelling in the face and below the chin. He is reporting tightness in the neck due to scar tissue. New stretches were tried to decrease tightness in the neck.  Nicolás Is progressing well towards his goals.   Patient  prognosis is Good.     Patient will continue to benefit from skilled outpatient occupational therapy to address the deficits listed in the problem list box on initial evaluation, provide pt/family education and to maximize pt's level of  independence in the home and community environment.     Patient 's spiritual, cultural and educational needs considered and patient agreeable to plan of care and goals.     Anticipated barriers to occupational therapy: non verbal    Goals    Short Term Goals: (4 weeks)  In Progress  Patient to be Independent and compliant with Home Exercise Program.  Met   Decrease girth in the face and neck by 6 cm for improved neck mobility.  Met   Patient will demonstrate 100% knowledge of lymphedema precautions and signs of infection.   Met   Patient will perform self-scar mobility techniques.  In Progress  Patient will perform self lymph drainage techniques.  In Progress  Patient will tolerate compression garment wearing schedule.     Long Term Goals: (12 weeks)  Met   Decrease girth in face and neck by 10 cm for improved neck mobility.  In Progress  Patient will show reduction in girth to mild or less with improved contour of face and neck.  In Progress  Patient to malinda/doff compression garment with daily compliance.       Plan      Patient to be seen 1-2 x per week for 90 days for the medically necessary treatments to include: decongestive massage- Manual lymphatic drainage, kinesio tape, scar massage, education in lymphedema precautions, self lymph drainage massage, and assistance in obtaining appropriate compression garment. Therapeutic exercise, postural correction, and progression of Home Exercise Program.       Thania Plata, OT, CLT

## 2024-03-11 ENCOUNTER — CLINICAL SUPPORT (OUTPATIENT)
Dept: REHABILITATION | Facility: HOSPITAL | Age: 74
End: 2024-03-11
Payer: MEDICARE

## 2024-03-11 DIAGNOSIS — I89.0 LYMPHEDEMA DUE TO RADIATION: Primary | ICD-10-CM

## 2024-03-11 PROCEDURE — 97110 THERAPEUTIC EXERCISES: CPT | Mod: PO

## 2024-03-11 PROCEDURE — 97140 MANUAL THERAPY 1/> REGIONS: CPT | Mod: PO

## 2024-03-14 ENCOUNTER — CLINICAL SUPPORT (OUTPATIENT)
Dept: REHABILITATION | Facility: HOSPITAL | Age: 74
End: 2024-03-14
Payer: MEDICARE

## 2024-03-14 DIAGNOSIS — R29.898 DECREASED ROM OF NECK: ICD-10-CM

## 2024-03-14 DIAGNOSIS — I89.0 LYMPHEDEMA DUE TO RADIATION: Primary | ICD-10-CM

## 2024-03-14 DIAGNOSIS — L90.5 SCAR CONDITIONS AND FIBROSIS OF SKIN: ICD-10-CM

## 2024-03-14 PROCEDURE — 97110 THERAPEUTIC EXERCISES: CPT | Mod: PO

## 2024-03-14 PROCEDURE — 97140 MANUAL THERAPY 1/> REGIONS: CPT | Mod: PO

## 2024-03-14 NOTE — PROGRESS NOTES
OccupationalTherapy Daily Treatment Note     Name: Cyrus Batres Jr.  Clinic Number: [id number]    Therapy Diagnosis:   Encounter Diagnosis   Name Primary?    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   Functional change: none    Pain: 0/10  Location: neck     Objective     Response to previous treatment: The area below the chin is much softer and the area has visibly reduced below the chin.  The scar is more mobile.  There was more wrinkling of the skin noted. The jaw line is more palpable and the face is more narrow. He indicated that there is an area below his chin that is tight on both sides of the neck.  Treatment:   Nicolás received the following manual therapy techniques:- Manual Lymphatic Drainage and Soft tissue Mobilization were performed to the: face and neck for 45 minutes, including: Manual Lymph Drainage and short stretch compression bandaging     MANUAL LYMPHATIC DRAINAGE:  Drainage of the Right and Left upper quadrant from the axilla extending up to the neck, over the ear and to the side of the face to the jaw line.  While supine with head elevate,  Drainage provided from the front of the neck, B subclavian regions,  Across ant chest and top of shoulder,  drainage of the entire face with return to the axillas.      Nicolás received therapeutic exercises to develop cardiac output for 8 minutes including:   THERAPEUTIC EXERCISE- Patient performed Nustep therex for 8 min set at 3 resistance to increase cardiac output and increase lymph circulation.   THERAPEUTIC EXERCISES:  Continue Home Exercise Program of Active Range Of Motion, stretching, and postural correction.    He was shown stretches using a sheet to hold down the shoulder while performing neck lateral flexion and neck lateral flexion with rotation right and left.        Home Exercises Provided and Patient Education Provided     Education provided:      PATIENT/FAMILY Education: garment wearing schedule,  Home Exercise Program,  Beginning of self massage,  Self or assisted bandaging , and Risk reduction    KINESIO TAPE: Nicolás was educated on kinesio tape wearing schedule and to remove if having ill effects.  Nicolás verbalized understanding of the above education.      Written Home Exercises Provided: Nicolás demonstrated good  understanding of the education provided.        Assessment     He arrived to the session and the area below the chin was not as pronounced as it was on the evaluation.  He is using a chip bag to address the fibrotic fluid below the chin.  He tolerated Manual lymph drainage with out an issue.  Kinesio tape is being used as a manual technique to increase lymph flow, direct lymph uptake and improve scar mobility.  The area below the chin was softer at the end of the session.  He has had a significant reduction of swelling in the face and below the chin. He is reporting tightness in the neck due to scar tissue. New stretches were tried to decrease tightness in the neck.  Nicolás Is progressing well towards his goals.   Patient  prognosis is Good.     Patient will continue to benefit from skilled outpatient occupational therapy to address the deficits listed in the problem list box on initial evaluation, provide pt/family education and to maximize  pt's level of independence in the home and community environment.     Patient 's spiritual, cultural and educational needs considered and patient agreeable to plan of care and goals.     Anticipated barriers to occupational therapy: non verbal    Goals    Short Term Goals: (4 weeks)  In Progress  Patient to be Independent and compliant with Home Exercise Program.  Met   Decrease girth in the face and neck by 6 cm for improved neck mobility.  Met   Patient will demonstrate 100% knowledge of lymphedema precautions and signs of infection.   Met   Patient will perform self-scar mobility techniques.  In Progress  Patient will perform self lymph drainage techniques.  In Progress  Patient will tolerate compression garment wearing schedule.     Long Term Goals: (12 weeks)  Met   Decrease girth in face and neck by 10 cm for improved neck mobility.  In Progress  Patient will show reduction in girth to mild or less with improved contour of face and neck.  In Progress  Patient to malinda/doff compression garment with daily compliance.       Plan      Patient to be seen 1-2 x per week for 90 days for the medically necessary treatments to include: decongestive massage- Manual lymphatic drainage, kinesio tape, scar massage, education in lymphedema precautions, self lymph drainage massage, and assistance in obtaining appropriate compression garment. Therapeutic exercise, postural correction, and progression of Home Exercise Program.       Thania Plata, OT, CLT

## 2024-03-14 NOTE — PROGRESS NOTES
OccupationalTherapy Daily Treatment Note     Name: Cyrus Batres Jr.  Clinic Number: [id number]    Therapy Diagnosis:   Encounter Diagnoses   Name Primary?    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    Scar conditions and fibrosis of skin     Decreased ROM of neck      Physician: Clay Lucero III, *    Visit Date: 3/14/2024     Physician Orders: Evaluation and treatment  Medical Diagnosis:     C32.9 (ICD-10-CM) - Larynx cancer      Evaluation Date: 12/26/2023  Insurance Authorization period Expiration: 12/26/2023-12/31/2024, 01/01/2024-12/31/2024  Plan of Care Certification Period: 12/26/2023-03/26/2024  FOTO 1:12/29/2023  FOTO 2:02/08/2024  FOTO 3:  Visit # / Visits Authortized: 15/ 40  Time In: 2:30  Time Out: 3:30  Total Billable Time: 60 minutes-Manual Therapy, Therapeutic Exercise     Precautions: Standard and cancer, Patient is Nonverbal    Subjective     Patient reports: That he feels like he is a pelican with the area below the chin is protruding.   Functional change: none    Pain: 0/10  Location: neck     Objective     Response to previous treatment: The area below the chin is much softer and the area has visibly reduced below the chin.  The scar is more mobile.  There was more wrinkling of the skin noted. The jaw line is more palpable and the face is more narrow. He indicated that there is an area below his chin that is tight on both sides of the neck.  Treatment:   Nicolás received the following manual therapy techniques:- Manual Lymphatic Drainage and Soft tissue Mobilization were performed to the: face and neck for 45 minutes, including: Manual Lymph Drainage and short stretch compression bandaging     MANUAL LYMPHATIC DRAINAGE:  Drainage of the Right and Left upper quadrant from the axilla extending up to the neck, over the ear and to the side of the face to the jaw line.  While supine with head elevate,  Drainage provided from the front of the neck, B subclavian regions,  Across ant chest and top of  shoulder,  drainage of the entire face with return to the axillas.     Nicolás received therapeutic exercises to develop cardiac output for 8 minutes including:   THERAPEUTIC EXERCISE- Patient performed Nustep therex for 8 min set at 3 resistance to increase cardiac output and increase lymph circulation.   THERAPEUTIC EXERCISES:  Continue Home Exercise Program of Active Range Of Motion, stretching, and postural correction.    He was shown stretches using a sheet to hold down the shoulder while performing neck lateral flexion and neck lateral flexion with rotation right and left.        Home Exercises Provided and Patient Education Provided     Education provided:      PATIENT/FAMILY Education: garment wearing schedule,  Home Exercise Program,  Beginning of self massage,  Self or assisted bandaging , and Risk reduction    KINESIO TAPE: Nicolás was educated on kinesio tape wearing schedule and to remove if having ill effects.  Nicolás verbalized understanding of the above education.      Written Home Exercises Provided: Nicolás demonstrated good  understanding of the education provided.        Assessment     He arrived to the session and the area below the chin was not as pronounced as it was on the evaluation.  He is using a chip bag to address the fibrotic fluid below the chin.  He tolerated Manual lymph drainage with out an issue.  Kinesio tape is being used as a manual technique to increase lymph flow, direct lymph uptake and improve scar mobility.  The area below the chin was softer at the end of the session.  He has had a significant reduction of swelling in the face and below the chin. He is reporting tightness in the neck due to scar tissue. New stretches were tried to decrease tightness in the neck.  Nicolás Is progressing well towards his goals.   Patient  prognosis is Good.     Patient will continue to benefit from skilled outpatient occupational therapy to address the deficits listed in the problem list box on  initial evaluation, provide pt/family education and to maximize pt's level of independence in the home and community environment.     Patient 's spiritual, cultural and educational needs considered and patient agreeable to plan of care and goals.     Anticipated barriers to occupational therapy: non verbal    Goals    Short Term Goals: (4 weeks)  In Progress  Patient to be Independent and compliant with Home Exercise Program.  Met   Decrease girth in the face and neck by 6 cm for improved neck mobility.  Met   Patient will demonstrate 100% knowledge of lymphedema precautions and signs of infection.   Met   Patient will perform self-scar mobility techniques.  In Progress  Patient will perform self lymph drainage techniques.  In Progress  Patient will tolerate compression garment wearing schedule.     Long Term Goals: (12 weeks)  Met   Decrease girth in face and neck by 10 cm for improved neck mobility.  In Progress  Patient will show reduction in girth to mild or less with improved contour of face and neck.  In Progress  Patient to malinda/doff compression garment with daily compliance.       Plan      Patient to be seen 1-2 x per week for 90 days for the medically necessary treatments to include: decongestive massage- Manual lymphatic drainage, kinesio tape, scar massage, education in lymphedema precautions, self lymph drainage massage, and assistance in obtaining appropriate compression garment. Therapeutic exercise, postural correction, and progression of Home Exercise Program.       Thania Plata, OT, CLT

## 2024-03-15 ENCOUNTER — TELEPHONE (OUTPATIENT)
Dept: HEMATOLOGY/ONCOLOGY | Facility: CLINIC | Age: 74
End: 2024-03-15

## 2024-03-15 ENCOUNTER — PATIENT MESSAGE (OUTPATIENT)
Dept: HEMATOLOGY/ONCOLOGY | Facility: CLINIC | Age: 74
End: 2024-03-15

## 2024-03-15 ENCOUNTER — LAB VISIT (OUTPATIENT)
Dept: LAB | Facility: HOSPITAL | Age: 74
End: 2024-03-15
Attending: INTERNAL MEDICINE
Payer: MEDICARE

## 2024-03-15 DIAGNOSIS — D50.9 IRON DEFICIENCY ANEMIA, UNSPECIFIED IRON DEFICIENCY ANEMIA TYPE: ICD-10-CM

## 2024-03-15 LAB
ALBUMIN SERPL BCP-MCNC: 4.2 G/DL (ref 3.5–5.2)
ALP SERPL-CCNC: 153 U/L (ref 55–135)
ALT SERPL W/O P-5'-P-CCNC: 41 U/L (ref 10–44)
ANION GAP SERPL CALC-SCNC: 9 MMOL/L (ref 8–16)
AST SERPL-CCNC: 34 U/L (ref 10–40)
BASOPHILS # BLD AUTO: 0.02 K/UL (ref 0–0.2)
BASOPHILS NFR BLD: 0.4 % (ref 0–1.9)
BILIRUB SERPL-MCNC: 1.2 MG/DL (ref 0.1–1)
BUN SERPL-MCNC: 27 MG/DL (ref 8–23)
CALCIUM SERPL-MCNC: 7.1 MG/DL (ref 8.7–10.5)
CHLORIDE SERPL-SCNC: 103 MMOL/L (ref 95–110)
CO2 SERPL-SCNC: 28 MMOL/L (ref 23–29)
CREAT SERPL-MCNC: 1.2 MG/DL (ref 0.5–1.4)
DIFFERENTIAL METHOD BLD: ABNORMAL
EOSINOPHIL # BLD AUTO: 0.2 K/UL (ref 0–0.5)
EOSINOPHIL NFR BLD: 5.1 % (ref 0–8)
ERYTHROCYTE [DISTWIDTH] IN BLOOD BY AUTOMATED COUNT: 12.2 % (ref 11.5–14.5)
EST. GFR  (NO RACE VARIABLE): >60 ML/MIN/1.73 M^2
FERRITIN SERPL-MCNC: 309.5 NG/ML (ref 20–300)
GLUCOSE SERPL-MCNC: 187 MG/DL (ref 70–110)
HCT VFR BLD AUTO: 35.7 % (ref 40–54)
HGB BLD-MCNC: 11.9 G/DL (ref 14–18)
IMM GRANULOCYTES # BLD AUTO: 0.02 K/UL (ref 0–0.04)
IMM GRANULOCYTES NFR BLD AUTO: 0.4 % (ref 0–0.5)
IRON SERPL-MCNC: 94 UG/DL (ref 45–160)
LYMPHOCYTES # BLD AUTO: 0.7 K/UL (ref 1–4.8)
LYMPHOCYTES NFR BLD: 14.9 % (ref 18–48)
MCH RBC QN AUTO: 32.4 PG (ref 27–31)
MCHC RBC AUTO-ENTMCNC: 33.3 G/DL (ref 32–36)
MCV RBC AUTO: 97 FL (ref 82–98)
MONOCYTES # BLD AUTO: 0.3 K/UL (ref 0.3–1)
MONOCYTES NFR BLD: 5.9 % (ref 4–15)
NEUTROPHILS # BLD AUTO: 3.5 K/UL (ref 1.8–7.7)
NEUTROPHILS NFR BLD: 73.3 % (ref 38–73)
NRBC BLD-RTO: 0 /100 WBC
PLATELET # BLD AUTO: 131 K/UL (ref 150–450)
PMV BLD AUTO: 12.5 FL (ref 9.2–12.9)
POTASSIUM SERPL-SCNC: 4 MMOL/L (ref 3.5–5.1)
PROT SERPL-MCNC: 7.1 G/DL (ref 6–8.4)
RBC # BLD AUTO: 3.67 M/UL (ref 4.6–6.2)
SATURATED IRON: 33 % (ref 20–50)
SODIUM SERPL-SCNC: 140 MMOL/L (ref 136–145)
TOTAL IRON BINDING CAPACITY: 287 UG/DL (ref 250–450)
TRANSFERRIN SERPL-MCNC: 205 MG/DL (ref 200–375)
WBC # BLD AUTO: 4.75 K/UL (ref 3.9–12.7)

## 2024-03-15 PROCEDURE — 82728 ASSAY OF FERRITIN: CPT | Performed by: INTERNAL MEDICINE

## 2024-03-15 PROCEDURE — 80053 COMPREHEN METABOLIC PANEL: CPT | Performed by: INTERNAL MEDICINE

## 2024-03-15 PROCEDURE — 85025 COMPLETE CBC W/AUTO DIFF WBC: CPT | Performed by: INTERNAL MEDICINE

## 2024-03-15 PROCEDURE — 36415 COLL VENOUS BLD VENIPUNCTURE: CPT | Performed by: INTERNAL MEDICINE

## 2024-03-15 PROCEDURE — 83540 ASSAY OF IRON: CPT | Performed by: INTERNAL MEDICINE

## 2024-03-15 NOTE — HIM RECORD RETIREMENT NOTE
halfway of Incomplete Medical Record    3/15/24    Patient Name: Nicolás Batres  Contact Serial # (CSN): [id number]  Patient Medical Record # (MRN): [id number]  Date of Service: Orders Only on 1/19/2022  Physician Name: Diana Calhoun MD [[id number]     This record has been reviewed and is being retired as incomplete by the approval of the  Medical Staff Operating Committee (MSOC)     On 09/07/2022., due to:  Unavailability of Provider     Missing Information/Comments:  []    Discharge Summary   []    DC Note/Short Stay Summary   []    ED Provider Note   []    Delivery Note   []    History & Physical   []   Operative Note   []     Procedure Note   []     Physician Order   [x]     Verbal Order   []       Other, specify:

## 2024-03-18 ENCOUNTER — CLINICAL SUPPORT (OUTPATIENT)
Dept: REHABILITATION | Facility: HOSPITAL | Age: 74
End: 2024-03-18
Payer: MEDICARE

## 2024-03-18 DIAGNOSIS — I89.0 LYMPHEDEMA DUE TO RADIATION: Primary | ICD-10-CM

## 2024-03-18 PROCEDURE — 97110 THERAPEUTIC EXERCISES: CPT | Mod: PO

## 2024-03-18 PROCEDURE — 97140 MANUAL THERAPY 1/> REGIONS: CPT | Mod: PO

## 2024-03-21 ENCOUNTER — CLINICAL SUPPORT (OUTPATIENT)
Dept: REHABILITATION | Facility: HOSPITAL | Age: 74
End: 2024-03-21
Payer: MEDICARE

## 2024-03-21 ENCOUNTER — PATIENT MESSAGE (OUTPATIENT)
Dept: HEMATOLOGY/ONCOLOGY | Facility: CLINIC | Age: 74
End: 2024-03-21

## 2024-03-21 DIAGNOSIS — I89.0 LYMPHEDEMA DUE TO RADIATION: Primary | ICD-10-CM

## 2024-03-21 PROCEDURE — 97140 MANUAL THERAPY 1/> REGIONS: CPT | Mod: PO

## 2024-03-21 PROCEDURE — 97110 THERAPEUTIC EXERCISES: CPT | Mod: PO

## 2024-03-21 NOTE — PROGRESS NOTES
OccupationalTherapy Daily Treatment Note     Name: Cyrus Batres Jr.  Clinic Number: [id number]    Therapy Diagnosis:   Encounter Diagnosis   Name Primary?    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  Functional change: none    Pain: 0/10  Location: neck     Objective     Response to previous treatment: The area below the chin is much softer and the area has visibly reduced below the chin.  The scar is more mobile.  There was more wrinkling of the skin noted. The jaw line is more palpable and the face is more narrow. He indicated that there is an area below his chin that is tight on both sides of the neck.  Treatment:   Nicolás received the following manual therapy techniques:- Manual Lymphatic Drainage and Soft tissue Mobilization were performed to the: face and neck for 45 minutes, including: Manual Lymph Drainage and short stretch compression bandaging     MANUAL LYMPHATIC DRAINAGE:  Drainage of the Right and Left upper quadrant from the axilla extending up to the neck, over the ear and to the side of the face to the jaw line.  While supine with head elevate,  Drainage provided from the front of the neck, B subclavian regions,  Across ant chest and top of shoulder,  drainage of the entire face with return to the axillas.     Nicolás received therapeutic  exercises to develop cardiac output for 8 minutes including:   THERAPEUTIC EXERCISE- Patient performed Nustep therex for 8 min set at 3 resistance to increase cardiac output and increase lymph circulation.   THERAPEUTIC EXERCISES:  Continue Home Exercise Program of Active Range Of Motion, stretching, and postural correction.    He was shown stretches using a sheet to hold down the shoulder while performing neck lateral flexion and neck lateral flexion with rotation right and left.        Home Exercises Provided and Patient Education Provided     Education provided:      PATIENT/FAMILY Education: garment wearing schedule,  Home Exercise Program,  Beginning of self massage,  Self or assisted bandaging , and Risk reduction    KINESIO TAPE: Nicolás was educated on kinesio tape wearing schedule and to remove if having ill effects.  Nicolás verbalized understanding of the above education.      Written Home Exercises Provided: Nicolás demonstrated good  understanding of the education provided.      Girth Measurements (in centimeters)  LAND[facility] Head/Neck Measurement Face  Right Face Left    Diagonal Head Circumference 65       Vertical Submental Circumference 61       Mandibular Angle to Mandibular Angle 22       Tragus Angle to Tragus Angle 31.7       Neck Superior 36       Neck Middle 33.       Neck Inferior  33       Tragus to Mental Protuberance  15 15.3     Tragus to Mouth Angle  10.8 11.3     Mandibular Angle to Nasal Wing  10.3 10     Mandibular Angle to Internal Eye Corner  13 12.5     Mandibular Angle to External Eye Corner  9.5 9     Mental Protuberance to Internal Eye Corner  11 10.8     Mandibular Angle to Mental Protuberance  11 10.5     Totals         Total girth measurement-   441.7       Total Girth Reduction   18.4       Lymphencephalopathy: Moderate      Sensation: Impaired  Scar Length: 15 cm   Scar Mobility: not mobile along the length of the scar in any direction.     Assessment     He arrived to the  session and the area below the chin was not as pronounced as it was on the evaluation.  He is using a chip bag to address the fibrotic fluid below the chin.  He tolerated Manual lymph drainage with out an issue.  Kinesio tape is being used as a manual technique to increase lymph flow, direct lymph uptake and improve scar mobility.  The area below the chin was softer at the end of the session.  He has had a significant reduction of swelling in the face and below the chin per girth measurments. He is reporting tightness in the neck due to scar tissue.  Nicolás Is progressing well towards his goals.   Patient  prognosis is Good.     Patient will continue to benefit from skilled outpatient occupational therapy to address the deficits listed in the problem list box on initial evaluation, provide pt/family education and to maximize pt's level of independence in the home and community environment.     Patient 's spiritual, cultural and educational needs considered and patient agreeable to plan of care and goals.     Anticipated barriers to occupational therapy: non verbal    Goals    Short Term Goals: (4 weeks)  In Progress  Patient to be Independent and compliant with Home Exercise Program.  Met   Decrease girth in the face and neck by 6 cm for improved neck mobility.  Met   Patient will demonstrate 100% knowledge of lymphedema precautions and signs of infection.   Met   Patient will perform self-scar mobility techniques.  In Progress  Patient will perform self lymph drainage techniques.  In Progress  Patient will tolerate compression garment wearing schedule.     Long Term Goals: (12 weeks)  Met   Decrease girth in face and neck by 10 cm for improved neck mobility.  In Progress  Patient will show reduction in girth to mild or less with improved contour of face and neck.  In Progress  Patient to malinda/doff compression garment with daily compliance.       Plan      Patient to be seen 1-2 x per week for 90 days for the  medically necessary treatments to include: decongestive massage- Manual lymphatic drainage, kinesio tape, scar massage, education in lymphedema precautions, self lymph drainage massage, and assistance in obtaining appropriate compression garment. Therapeutic exercise, postural correction, and progression of Home Exercise Program.       Thania Plata, OT, CLT

## 2024-03-21 NOTE — PROGRESS NOTES
OccupationalTherapy Daily Treatment Note     Name: Cyrus Batres Jr.  Clinic Number: [id number]    Therapy Diagnosis:   Encounter Diagnosis   Name Primary?    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   Functional change: none    Pain: 0/10  Location: neck     Objective     Response to previous treatment: The area below the chin is much softer and the area has visibly reduced below the chin.  The scar is more mobile.  There was more wrinkling of the skin noted. The jaw line is more palpable and the face is more narrow. He indicated that there is an area below his chin that is tight on both sides of the neck.  Treatment:   Nicolás received the following manual therapy techniques:- Manual Lymphatic Drainage and Soft tissue Mobilization were performed to the: face and neck for 45 minutes, including: Manual Lymph Drainage and short stretch compression bandaging     MANUAL LYMPHATIC DRAINAGE:  Drainage of the Right and Left upper quadrant from the axilla extending up to the neck, over the ear and to the side of the face to the jaw line.  While supine with head elevate,  Drainage provided from the front of the neck, B subclavian regions,  Across ant chest and top of shoulder,  drainage of the entire face with return to the axillas.      Nicolás received therapeutic exercises to develop cardiac output for 8 minutes including:   THERAPEUTIC EXERCISE- Patient performed Nustep therex for 8 min set at 3 resistance to increase cardiac output and increase lymph circulation.   THERAPEUTIC EXERCISES:  Continue Home Exercise Program of Active Range Of Motion, stretching, and postural correction.    He was shown stretches using a sheet to hold down the shoulder while performing neck lateral flexion and neck lateral flexion with rotation right and left.        Home Exercises Provided and Patient Education Provided     Education provided:      PATIENT/FAMILY Education: garment wearing schedule,  Home Exercise Program,  Beginning of self massage,  Self or assisted bandaging , and Risk reduction    KINESIO TAPE: Nicolás was educated on kinesio tape wearing schedule and to remove if having ill effects.  Nicolás verbalized understanding of the above education.      Written Home Exercises Provided: Nicolás demonstrated good  understanding of the education provided.        Assessment     He arrived to the session and the area below the chin was not as pronounced as it was on the evaluation.  He is using a chip bag to address the fibrotic fluid below the chin.  He tolerated Manual lymph drainage with out an issue.  Kinesio tape is being used as a manual technique to increase lymph flow, direct lymph uptake and improve scar mobility.  The area below the chin was softer at the end of the session.  He has had a significant reduction of swelling in the face and below the chin. He is reporting tightness in the neck due to scar tissue.  Nicolás Is progressing well towards his goals.   Patient  prognosis is Good.     Patient will continue to benefit from skilled outpatient occupational therapy to address the deficits listed in the problem list box on initial evaluation, provide pt/family education and to maximize pt's level of independence in the home and community  environment.     Patient 's spiritual, cultural and educational needs considered and patient agreeable to plan of care and goals.     Anticipated barriers to occupational therapy: non verbal    Goals    Short Term Goals: (4 weeks)  In Progress  Patient to be Independent and compliant with Home Exercise Program.  Met   Decrease girth in the face and neck by 6 cm for improved neck mobility.  Met   Patient will demonstrate 100% knowledge of lymphedema precautions and signs of infection.   Met   Patient will perform self-scar mobility techniques.  In Progress  Patient will perform self lymph drainage techniques.  In Progress  Patient will tolerate compression garment wearing schedule.     Long Term Goals: (12 weeks)  Met   Decrease girth in face and neck by 10 cm for improved neck mobility.  In Progress  Patient will show reduction in girth to mild or less with improved contour of face and neck.  In Progress  Patient to malinda/doff compression garment with daily compliance.       Plan      Patient to be seen 1-2 x per week for 90 days for the medically necessary treatments to include: decongestive massage- Manual lymphatic drainage, kinesio tape, scar massage, education in lymphedema precautions, self lymph drainage massage, and assistance in obtaining appropriate compression garment. Therapeutic exercise, postural correction, and progression of Home Exercise Program.       Thania Plata, OT, CLT

## 2024-03-25 ENCOUNTER — CLINICAL SUPPORT (OUTPATIENT)
Dept: REHABILITATION | Facility: HOSPITAL | Age: 74
End: 2024-03-25
Payer: MEDICARE

## 2024-03-25 DIAGNOSIS — I89.0 LYMPHEDEMA DUE TO RADIATION: Primary | ICD-10-CM

## 2024-03-25 PROCEDURE — 97140 MANUAL THERAPY 1/> REGIONS: CPT | Mod: PO

## 2024-03-25 PROCEDURE — 97110 THERAPEUTIC EXERCISES: CPT | Mod: PO

## 2024-03-28 NOTE — PROGRESS NOTES
OccupationalTherapy Daily Treatment Note     Name: Cyrus Batres Jr.  Clinic Number: [id number]    Therapy Diagnosis:   No diagnosis found.    Physician: Clay Lucero III, *    Visit Date: 3/25/2024     Physician Orders: Evaluation and treatment  Medical Diagnosis:     C32.9 (ICD-10-CM) - Larynx cancer      Evaluation Date: 12/26/2023  Insurance Authorization period Expiration: 12/26/2023-12/31/2024, 01/01/2024-12/31/2024  Plan of Care Certification Period: 12/26/2023-03/26/2024  FOTO 1:12/29/2023  FOTO 2:02/08/2024  FOTO 3:  Visit # / Visits Authortized: 16/ 40  Time In: 2:30  Time Out: 3:30  Total Billable Time: 60 minutes-Manual Therapy, Therapeutic Exercise     Precautions: Standard and cancer, Patient is Nonverbal    Subjective     Patient reports: That this therapy has been very impactful.  Functional change: none    Pain: 0/10  Location: neck     Objective     Response to previous treatment: The area below the chin is much softer and the area has visibly reduced below the chin.  The scar is more mobile.  There was more wrinkling of the skin noted. The jaw line is more palpable and the face is more narrow. He indicated that there is an area below his chin that is tight on both sides of the neck.  Treatment:   Nicolás received the following manual therapy techniques:- Manual Lymphatic Drainage and Soft tissue Mobilization were performed to the: face and neck for 45 minutes, including: Manual Lymph Drainage and short stretch compression bandaging     MANUAL LYMPHATIC DRAINAGE:  Drainage of the Right and Left upper quadrant from the axilla extending up to the neck, over the ear and to the side of the face to the jaw line.  While supine with head elevate,  Drainage provided from the front of the neck, B subclavian regions,  Across ant chest and top of shoulder,  drainage of the entire face with return to the axillas.     Nicolás received therapeutic exercises to develop cardiac output for 8 minutes including:    THERAPEUTIC EXERCISE- Patient performed Nustep therex for 8 min set at 3 resistance to increase cardiac output and increase lymph circulation.   THERAPEUTIC EXERCISES:  Continue Home Exercise Program of Active Range Of Motion, stretching, and postural correction.    He was shown stretches using a sheet to hold down the shoulder while performing neck lateral flexion and neck lateral flexion with rotation right and left.        Home Exercises Provided and Patient Education Provided     Education provided:      PATIENT/FAMILY Education: garment wearing schedule,  Home Exercise Program,  Beginning of self massage,  Self or assisted bandaging , and Risk reduction    KINESIO TAPE: Nicolás was educated on kinesio tape wearing schedule and to remove if having ill effects.  Nicolás verbalized understanding of the above education.      Written Home Exercises Provided: Nicolás demonstrated good  understanding of the education provided.     Assessment     He arrived to the session and the area below the chin was not as pronounced as it was on the evaluation.  He is using a chip bag to address the fibrotic fluid below the chin.  He tolerated Manual lymph drainage with out an issue.  Kinesio tape is being used as a manual technique to increase lymph flow, direct lymph uptake and improve scar mobility.  The area below the chin was softer at the end of the session.  He has had a significant reduction of swelling in the face and below the chin per girth measurments. He is reporting tightness in the neck due to scar tissue.  Nicolás Is progressing well towards his goals.   Patient  prognosis is Good.     Patient will continue to benefit from skilled outpatient occupational therapy to address the deficits listed in the problem list box on initial evaluation, provide pt/family education and to maximize pt's level of independence in the home and community environment.     Patient 's spiritual, cultural and educational needs considered and  patient agreeable to plan of care and goals.     Anticipated barriers to occupational therapy: non verbal    Goals    Short Term Goals: (4 weeks)  In Progress  Patient to be Independent and compliant with Home Exercise Program.  Met   Decrease girth in the face and neck by 6 cm for improved neck mobility.  Met   Patient will demonstrate 100% knowledge of lymphedema precautions and signs of infection.   Met   Patient will perform self-scar mobility techniques.  In Progress  Patient will perform self lymph drainage techniques.  In Progress  Patient will tolerate compression garment wearing schedule.     Long Term Goals: (12 weeks)  Met   Decrease girth in face and neck by 10 cm for improved neck mobility.  In Progress  Patient will show reduction in girth to mild or less with improved contour of face and neck.  In Progress  Patient to malinda/doff compression garment with daily compliance.       Plan      Patient to be seen 1-2 x per week for 90 days for the medically necessary treatments to include: decongestive massage- Manual lymphatic drainage, kinesio tape, scar massage, education in lymphedema precautions, self lymph drainage massage, and assistance in obtaining appropriate compression garment. Therapeutic exercise, postural correction, and progression of Home Exercise Program.       Thania Plata, OT, CLT

## 2024-04-03 ENCOUNTER — OFFICE VISIT (OUTPATIENT)
Dept: ENDOCRINOLOGY | Facility: CLINIC | Age: 74
End: 2024-04-03
Payer: MEDICARE

## 2024-04-03 VITALS
DIASTOLIC BLOOD PRESSURE: 70 MMHG | OXYGEN SATURATION: 98 % | HEART RATE: 70 BPM | HEIGHT: 66 IN | TEMPERATURE: 98 F | BODY MASS INDEX: 24.41 KG/M2 | SYSTOLIC BLOOD PRESSURE: 120 MMHG | WEIGHT: 151.88 LBS

## 2024-04-03 DIAGNOSIS — I10 BENIGN HYPERTENSION: ICD-10-CM

## 2024-04-03 DIAGNOSIS — E11.610 TYPE 2 DIABETES MELLITUS WITH DIABETIC NEUROPATHIC ARTHROPATHY, WITH LONG-TERM CURRENT USE OF INSULIN: ICD-10-CM

## 2024-04-03 DIAGNOSIS — E11.65 TYPE 2 DIABETES MELLITUS WITH HYPERGLYCEMIA, WITH LONG-TERM CURRENT USE OF INSULIN: Primary | ICD-10-CM

## 2024-04-03 DIAGNOSIS — Z78.9 STATIN INTOLERANCE: ICD-10-CM

## 2024-04-03 DIAGNOSIS — Z79.4 TYPE 2 DIABETES MELLITUS WITH DIABETIC NEUROPATHIC ARTHROPATHY, WITH LONG-TERM CURRENT USE OF INSULIN: ICD-10-CM

## 2024-04-03 DIAGNOSIS — N52.9 ERECTILE DYSFUNCTION, UNSPECIFIED ERECTILE DYSFUNCTION TYPE: ICD-10-CM

## 2024-04-03 DIAGNOSIS — E83.51 HYPOCALCEMIA: ICD-10-CM

## 2024-04-03 DIAGNOSIS — Z79.4 TYPE 2 DIABETES MELLITUS WITH HYPERGLYCEMIA, WITH LONG-TERM CURRENT USE OF INSULIN: Primary | ICD-10-CM

## 2024-04-03 DIAGNOSIS — E03.9 HYPOTHYROIDISM, UNSPECIFIED TYPE: ICD-10-CM

## 2024-04-03 DIAGNOSIS — E78.5 HYPERLIPIDEMIA, UNSPECIFIED HYPERLIPIDEMIA TYPE: ICD-10-CM

## 2024-04-03 PROCEDURE — 99999 PR PBB SHADOW E&M-EST. PATIENT-LVL IV: CPT | Mod: PBBFAC,,, | Performed by: PHYSICIAN ASSISTANT

## 2024-04-03 PROCEDURE — 3078F DIAST BP <80 MM HG: CPT | Mod: CPTII,S$GLB,, | Performed by: PHYSICIAN ASSISTANT

## 2024-04-03 PROCEDURE — 1157F ADVNC CARE PLAN IN RCRD: CPT | Mod: CPTII,S$GLB,, | Performed by: PHYSICIAN ASSISTANT

## 2024-04-03 PROCEDURE — 3044F HG A1C LEVEL LT 7.0%: CPT | Mod: CPTII,S$GLB,, | Performed by: PHYSICIAN ASSISTANT

## 2024-04-03 PROCEDURE — 1101F PT FALLS ASSESS-DOCD LE1/YR: CPT | Mod: CPTII,S$GLB,, | Performed by: PHYSICIAN ASSISTANT

## 2024-04-03 PROCEDURE — 3008F BODY MASS INDEX DOCD: CPT | Mod: CPTII,S$GLB,, | Performed by: PHYSICIAN ASSISTANT

## 2024-04-03 PROCEDURE — 3074F SYST BP LT 130 MM HG: CPT | Mod: CPTII,S$GLB,, | Performed by: PHYSICIAN ASSISTANT

## 2024-04-03 PROCEDURE — 1159F MED LIST DOCD IN RCRD: CPT | Mod: CPTII,S$GLB,, | Performed by: PHYSICIAN ASSISTANT

## 2024-04-03 PROCEDURE — 99214 OFFICE O/P EST MOD 30 MIN: CPT | Mod: S$GLB,,, | Performed by: PHYSICIAN ASSISTANT

## 2024-04-03 PROCEDURE — 1160F RVW MEDS BY RX/DR IN RCRD: CPT | Mod: CPTII,S$GLB,, | Performed by: PHYSICIAN ASSISTANT

## 2024-04-03 PROCEDURE — 3072F LOW RISK FOR RETINOPATHY: CPT | Mod: CPTII,S$GLB,, | Performed by: PHYSICIAN ASSISTANT

## 2024-04-03 PROCEDURE — 1126F AMNT PAIN NOTED NONE PRSNT: CPT | Mod: CPTII,S$GLB,, | Performed by: PHYSICIAN ASSISTANT

## 2024-04-03 PROCEDURE — 3288F FALL RISK ASSESSMENT DOCD: CPT | Mod: CPTII,S$GLB,, | Performed by: PHYSICIAN ASSISTANT

## 2024-04-03 NOTE — PROGRESS NOTES
CC: This 73 y.o. male presents for management of Diabetes Mellitus  and chronic conditions pending review including HTN, HLP.    HPI: was diagnosed with T2DM in . States his his appetite has decreased. 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 He finished radiation in 2020. Declines . Pt has had a thyroidectomy and laryngectomy for a laryngeal carcinoma. He cannot talk, uses a pad to write. Pt diagnosed w/ Tongue cancer and had a glossectomy in .   Has never been hospitalized r/t DM.  Family hx of DM: father  Fhx of thyroid disease: none  Denies missing doses of DM medication.   hypoglycemia at home: none  monitoring BG at home:  Fastin-125    Diet:   5 Glucerna daily.  No snacks. Avoids sugary beverages.     Exercise: PT 2x weekly for his neck. He is doing strengthening  exercises for his arms and legs.    CURRENT DM MEDS: none  Standards of Care:  Eye exam: Dr. Munoz  Podiatry exam: n/a  DE: never.     Hypothyroidism  S/p thyroidectomy for esophageal cancer  Recently changed dose to 100 mcg daily.  No fatigue, palpitations, diarrhea, tremors or constipation.    Hypocalcemia  +perioral numbness, bl hand numbness  Calcitrate 20 mLs bid    PMHx, PSHx: reviewed in epic.  Social Hx: no E/T use.    Wt Readings from Last 10 Encounters:   24 68.9 kg (151 lb 14.4 oz)   24 66.7 kg (147 lb 1.6 oz)   24 67.1 kg (148 lb)   24 64 kg (141 lb)   24 66.2 kg (145 lb 15.1 oz)   24 65.8 kg (145 lb)   23 65.8 kg (145 lb)   23 66.9 kg (147 lb 7.8 oz)   11/15/23 67.9 kg (149 lb 12.8 oz)   23 65.2 kg (143 lb 11.8 oz)      ROS:   Gen: Appetite good, + weight gain (5 lbs), denies fatigue and weakness.  Skin: Skin is intact and heals well , no rashes, no hair changes  Eyes: Denies visual disturbances  Resp: no SOB or GRIFFIN, no cough  Cardiac: No palpitations, chest pain, no edema   GI: No nausea or vomiting, diarrhea,  "constipation, or abdominal pain.  /GYN: + difficulty with erections, No nocturia, burning or pain.   MS/Neuro: Denies numbness/ tingling in BLE; Gait steady, speech clear  Psych: Denies drug/ETOH abuse, no hx of depression.  Other systems: negative.    /70 (BP Location: Right arm, Patient Position: Sitting, BP Method: Small (Manual))   Pulse 70   Temp 97.7 °F (36.5 °C) (Oral)   Ht 5' 6" (1.676 m)   Wt 68.9 kg (151 lb 14.4 oz)   SpO2 98%   BMI 24.52 kg/m²      PE:  GENERAL:  middle aged talkative male, hoarse, well developed, well nourished.  PSYCH: AAOx3, appropriate mood and affect, pleasant expression, conversant, appears relaxed, well groomed.   EYES: Conjunctiva, corneas clear  CHEST: Resp even and unlabored,   NEURO: Gait steady, CN ll-Xll grossly intact  SKIN: Skin warm and dry no acanthosis nigracans.  6/23  Foot Exam: no sores or macerations noted. Onychomycosis on nails bl.    Protective Sensation (w/ 10 gram monofilament):  Right: Intact  Left: Intact    Visual Inspection:  Normal -  Bilateral and Nails Intact - without Evidence of Foot Deformity- Bilateral    Pedal Pulses:   Right: Present  Left: Present    Posterior tibialis:   Right:Present  Left: Present     Vibratory Sensation  Right:Decreased  Left:Decreased    Personally reviewed labs below:    Lab Visit on 03/15/2024   Component Date Value Ref Range Status    WBC 03/15/2024 4.75  3.90 - 12.70 K/uL Final    RBC 03/15/2024 3.67 (L)  4.60 - 6.20 M/uL Final    Hemoglobin 03/15/2024 11.9 (L)  14.0 - 18.0 g/dL Final    Hematocrit 03/15/2024 35.7 (L)  40.0 - 54.0 % Final    MCV 03/15/2024 97  82 - 98 fL Final    MCH 03/15/2024 32.4 (H)  27.0 - 31.0 pg Final    MCHC 03/15/2024 33.3  32.0 - 36.0 g/dL Final    RDW 03/15/2024 12.2  11.5 - 14.5 % Final    Platelets 03/15/2024 131 (L)  150 - 450 K/uL Final    MPV 03/15/2024 12.5  9.2 - 12.9 fL Final    Immature Granulocytes 03/15/2024 0.4  0.0 - 0.5 % Final    Gran # (ANC) 03/15/2024 3.5  1.8 - " 7.7 K/uL Final    Immature Grans (Abs) 03/15/2024 0.02  0.00 - 0.04 K/uL Final    Comment: Mild elevation in immature granulocytes is non specific and   can be seen in a variety of conditions including stress response,   acute inflammation, trauma and pregnancy. Correlation with other   laboratory and clinical findings is essential.      Lymph # 03/15/2024 0.7 (L)  1.0 - 4.8 K/uL Final    Mono # 03/15/2024 0.3  0.3 - 1.0 K/uL Final    Eos # 03/15/2024 0.2  0.0 - 0.5 K/uL Final    Baso # 03/15/2024 0.02  0.00 - 0.20 K/uL Final    nRBC 03/15/2024 0  0 /100 WBC Final    Gran % 03/15/2024 73.3 (H)  38.0 - 73.0 % Final    Lymph % 03/15/2024 14.9 (L)  18.0 - 48.0 % Final    Mono % 03/15/2024 5.9  4.0 - 15.0 % Final    Eosinophil % 03/15/2024 5.1  0.0 - 8.0 % Final    Basophil % 03/15/2024 0.4  0.0 - 1.9 % Final    Differential Method 03/15/2024 Automated   Final    Sodium 03/15/2024 140  136 - 145 mmol/L Final    Potassium 03/15/2024 4.0  3.5 - 5.1 mmol/L Final    Chloride 03/15/2024 103  95 - 110 mmol/L Final    CO2 03/15/2024 28  23 - 29 mmol/L Final    Glucose 03/15/2024 187 (H)  70 - 110 mg/dL Final    BUN 03/15/2024 27 (H)  8 - 23 mg/dL Final    Creatinine 03/15/2024 1.2  0.5 - 1.4 mg/dL Final    Calcium 03/15/2024 7.1 (L)  8.7 - 10.5 mg/dL Final    Total Protein 03/15/2024 7.1  6.0 - 8.4 g/dL Final    Albumin 03/15/2024 4.2  3.5 - 5.2 g/dL Final    Total Bilirubin 03/15/2024 1.2 (H)  0.1 - 1.0 mg/dL Final    Comment: For infants and newborns, interpretation of results should be based  on gestational age, weight and in agreement with clinical  observations.    Premature Infant recommended reference ranges:  Up to 24 hours.............<8.0 mg/dL  Up to 48 hours............<12.0 mg/dL  3-5 days..................<15.0 mg/dL  6-29 days.................<15.0 mg/dL      Alkaline Phosphatase 03/15/2024 153 (H)  55 - 135 U/L Final    AST 03/15/2024 34  10 - 40 U/L Final    ALT 03/15/2024 41  10 - 44 U/L Final    eGFR  03/15/2024 >60.0  >60 mL/min/1.73 m^2 Final    Anion Gap 03/15/2024 9  8 - 16 mmol/L Final    Iron 03/15/2024 94  45 - 160 ug/dL Final    Transferrin 03/15/2024 205  200 - 375 mg/dL Final    TIBC 03/15/2024 287  250 - 450 ug/dL Final    Saturated Iron 03/15/2024 33  20 - 50 % Final    Ferritin 03/15/2024 309.5 (H)  20.0 - 300.0 ng/mL Final       ASSESSMENT and PLAN:    1. Type 2 diabetes mellitus with hyperglycemia, with long-term current use of insulin        2. Benign hypertension        3. Hyperlipidemia, unspecified hyperlipidemia type        4. Statin intolerance        5. Erectile dysfunction, unspecified erectile dysfunction type        6. Hypothyroidism, unspecified type        7. Hypocalcemia           T2DM with hyperglycemia-   A1c is in the normal range.   Diet controlled. Monitor.   F/u w/ PCP. Discussed DM, progression of disease, long term complications, tx options.     - takes ASA, ARB, statin    HTN - controlled, continue ASA- monitor.   HLP - LDL , on statin therapy, LFTs WNL.   Hypovitaminosis l-dyvphf-jhfrpvgo ergocalciferol 2x weekly.  Statin intolerance-monitor cholesterol  ED- testosterone is wnl-monitor  Wilmhwxcyfnbhm-fwuhnb-xnkpkmmk elevated- okay up to 6-not sx-continue -100 mcg qd.   Hypocalcemia-having perioral numbness and tingling. Increase calcium carbonate to 30 mLs in the am and 20 ml qpm. Has diarrhea with with 30 ml bid. Restart calcitriol qd. F/u w/ Onc.     F/u prn

## 2024-04-04 ENCOUNTER — HOSPITAL ENCOUNTER (OUTPATIENT)
Dept: PREADMISSION TESTING | Facility: HOSPITAL | Age: 74
Discharge: HOME | End: 2024-04-04
Attending: INTERNAL MEDICINE
Payer: MEDICARE

## 2024-04-04 ENCOUNTER — INFUSION (OUTPATIENT)
Dept: INFUSION THERAPY | Facility: HOSPITAL | Age: 74
End: 2024-04-04
Attending: INTERNAL MEDICINE
Payer: MEDICARE

## 2024-04-04 VITALS
RESPIRATION RATE: 18 BRPM | HEIGHT: 66 IN | HEART RATE: 66 BPM | DIASTOLIC BLOOD PRESSURE: 56 MMHG | WEIGHT: 151 LBS | OXYGEN SATURATION: 98 % | BODY MASS INDEX: 24.27 KG/M2 | TEMPERATURE: 98 F | SYSTOLIC BLOOD PRESSURE: 117 MMHG

## 2024-04-04 VITALS
HEART RATE: 64 BPM | TEMPERATURE: 97 F | SYSTOLIC BLOOD PRESSURE: 98 MMHG | RESPIRATION RATE: 16 BRPM | OXYGEN SATURATION: 99 % | WEIGHT: 149.81 LBS | DIASTOLIC BLOOD PRESSURE: 60 MMHG | HEIGHT: 66 IN | BODY MASS INDEX: 24.08 KG/M2

## 2024-04-04 DIAGNOSIS — C01 MALIGNANT NEOPLASM OF BASE OF TONGUE: ICD-10-CM

## 2024-04-04 DIAGNOSIS — C32.9 LARYNX CANCER: Primary | ICD-10-CM

## 2024-04-04 DIAGNOSIS — E86.0 DEHYDRATION: ICD-10-CM

## 2024-04-04 DIAGNOSIS — Z01.818 PREOP TESTING: Primary | ICD-10-CM

## 2024-04-04 PROCEDURE — 93010 ELECTROCARDIOGRAM REPORT: CPT | Mod: ,,, | Performed by: GENERAL PRACTICE

## 2024-04-04 PROCEDURE — 93005 ELECTROCARDIOGRAM TRACING: CPT | Performed by: GENERAL PRACTICE

## 2024-04-04 PROCEDURE — 25000003 PHARM REV CODE 250: Performed by: INTERNAL MEDICINE

## 2024-04-04 PROCEDURE — 96523 IRRIG DRUG DELIVERY DEVICE: CPT

## 2024-04-04 PROCEDURE — A4216 STERILE WATER/SALINE, 10 ML: HCPCS | Performed by: INTERNAL MEDICINE

## 2024-04-04 PROCEDURE — 63600175 PHARM REV CODE 636 W HCPCS: Performed by: INTERNAL MEDICINE

## 2024-04-04 RX ORDER — SODIUM CHLORIDE 0.9 % (FLUSH) 0.9 %
10 SYRINGE (ML) INJECTION
Status: CANCELLED | OUTPATIENT
Start: 2024-04-04

## 2024-04-04 RX ORDER — SODIUM CHLORIDE 0.9 % (FLUSH) 0.9 %
10 SYRINGE (ML) INJECTION
Status: DISCONTINUED | OUTPATIENT
Start: 2024-04-04 | End: 2024-04-04 | Stop reason: HOSPADM

## 2024-04-04 RX ORDER — HEPARIN 100 UNIT/ML
500 SYRINGE INTRAVENOUS
Status: DISCONTINUED | OUTPATIENT
Start: 2024-04-04 | End: 2024-04-04 | Stop reason: HOSPADM

## 2024-04-04 RX ORDER — HEPARIN 100 UNIT/ML
500 SYRINGE INTRAVENOUS
Status: CANCELLED | OUTPATIENT
Start: 2024-04-04

## 2024-04-04 RX ADMIN — HEPARIN 500 UNITS: 100 SYRINGE at 03:04

## 2024-04-04 RX ADMIN — SODIUM CHLORIDE, PRESERVATIVE FREE 10 ML: 5 INJECTION INTRAVENOUS at 03:04

## 2024-04-04 NOTE — PLAN OF CARE
Problem: Infection  Goal: Absence of Infection Signs and Symptoms  Outcome: Ongoing, Progressing  Intervention: Prevent or Manage Infection  Flowsheets (Taken 4/4/2024 7491)  Infection Management: aseptic technique maintained  Isolation Precautions: precautions maintained

## 2024-04-04 NOTE — DISCHARGE INSTRUCTIONS
To confirm, Your doctor has instructed you that procedure is scheduled for: Tuesday April 9th        Endoscopy nurse will call the afternoon prior to surgery with your arrival time.      Please  Enter at [employer] Park[facility] and come through front entrance.       Check in at registration.     After registration, proceed past gift shop and through glass door ( [facility]) Check in at the nurses station to the left.     FOLLOW CLEAR LIQUIDS AND COLON PREP AS DIRECTED BY PHYSICIAN      Do not eat or drink anything after midnight the night before your surgery - THIS INCLUDES  WATER, GUM, MINTS AND CANDY.  YOU MAY BRUSH YOUR TEETH BUT DO NOT SWALLOW       TAKE ONLY THESE MEDICATIONS WITH A SMALL SIP OF WATER THE MORNING OF YOUR PROCEDURE: Thyroid medicine only      ONLY if you are diabetic, check your sugar in the morning before your procedure.       Do not take any diabetic medicines or insulin the morning of surgery .     PLEASE NOTE:  The surgery schedule has many variables which may affect the time of your surgery case.  Family members should be available if your surgery time changes.  Plan to be here the day of your procedure between 4-6 hours.      DO NOT TAKE THESE MEDICATIONS 5-7 DAYS PRIOR to your procedure or per your surgeon's request: ASPIRIN, ALEVE, ADVIL, IBUPROFEN,  CHANCE SELTZER, BC , FISH OIL , VITAMIN E, HERBALS  (May take Tylenol)      ONLY if you are prescribed any types of blood thinners such as:  Aspirin, Coumadin, Plavix, Pradaxa, Xarelto, Aggrenox, Effient, Eliquis, Savasya, Brilinta, or any other, ask your surgeon whether you should stop taking them and how long before surgery you should stop.  You may also need to verify with the prescribing physician if it is ok to stop your medication.                                                        IMPORTANT INSTRUCTIONS      Shower the night before  and sleep in a bed with clean sheets.  Do not sleep with a pet in the bed.       If you wear contact  lenses, dentures, hearing aids or glasses, bring a container to put them in during surgery and give to a family member for safe keeping.    Please leave all jewelry, piercing's and valuables at home.   Wear rubber soled shoes (no flip flops).  ONLY for patients requiring bowel prep, written instructions will be given by your doctor's office.  If your doctor has scheduled you for an overnight stay, bring a small overnight bag with any personal items you need.    Make arrangements in advance for transportation home by a responsible adult.      You must make arrangements for transportation, TAXI'S, UBER'S OR LYFTS ARE NOT ALLOWED.        If you have any questions about these instructions, call (Monday - Friday)  Endoscopy [phone]

## 2024-04-04 NOTE — PRE-PROCEDURE INSTRUCTIONS
Pre admit visit complete. Patient does not talk, had glossectomy and pharngectomy with tracheosotmy which is capped. Patient uses electronic pad to communicate, refused a  or wife to be present. Patient communicates effectively with pad. Labs done recently, PET scan and chest CT within date, EKG done at PAT visit. Instructions given per AVS. Patient had no questions.

## 2024-04-09 ENCOUNTER — ANESTHESIA EVENT (OUTPATIENT)
Dept: SURGERY | Facility: HOSPITAL | Age: 74
End: 2024-04-09
Payer: MEDICARE

## 2024-04-09 ENCOUNTER — HOSPITAL ENCOUNTER (OUTPATIENT)
Facility: HOSPITAL | Age: 74
Discharge: HOME | End: 2024-04-09
Attending: INTERNAL MEDICINE | Admitting: INTERNAL MEDICINE
Payer: MEDICARE

## 2024-04-09 ENCOUNTER — ANESTHESIA (OUTPATIENT)
Dept: SURGERY | Facility: HOSPITAL | Age: 74
End: 2024-04-09
Payer: MEDICARE

## 2024-04-09 VITALS
OXYGEN SATURATION: 100 % | OXYGEN SATURATION: 100 % | HEART RATE: 59 BPM | SYSTOLIC BLOOD PRESSURE: 109 MMHG | HEART RATE: 58 BPM | TEMPERATURE: 97 F | DIASTOLIC BLOOD PRESSURE: 55 MMHG | DIASTOLIC BLOOD PRESSURE: 59 MMHG | RESPIRATION RATE: 13 BRPM | RESPIRATION RATE: 16 BRPM | SYSTOLIC BLOOD PRESSURE: 120 MMHG

## 2024-04-09 DIAGNOSIS — Z12.11 SCREENING FOR COLON CANCER: ICD-10-CM

## 2024-04-09 LAB — GLUCOSE SERPL-MCNC: 116 MG/DL (ref 70–110)

## 2024-04-09 PROCEDURE — D9220A PRA ANESTHESIA: Mod: CRNA,,, | Performed by: NURSE ANESTHETIST, CERTIFIED REGISTERED

## 2024-04-09 PROCEDURE — 37000009 HC ANESTHESIA EA ADD 15 MINS: Performed by: INTERNAL MEDICINE

## 2024-04-09 PROCEDURE — D9220A PRA ANESTHESIA: Mod: ANES,,, | Performed by: ANESTHESIOLOGY

## 2024-04-09 PROCEDURE — 43200 ESOPHAGOSCOPY FLEXIBLE BRUSH: CPT | Performed by: INTERNAL MEDICINE

## 2024-04-09 PROCEDURE — 37000008 HC ANESTHESIA 1ST 15 MINUTES: Performed by: INTERNAL MEDICINE

## 2024-04-09 PROCEDURE — 25000003 PHARM REV CODE 250: Performed by: NURSE ANESTHETIST, CERTIFIED REGISTERED

## 2024-04-09 PROCEDURE — 27800812 HC TUBE,RPLCMT SPLY PEG OR J-TUBE: Performed by: INTERNAL MEDICINE

## 2024-04-09 PROCEDURE — 82962 GLUCOSE BLOOD TEST: CPT | Performed by: INTERNAL MEDICINE

## 2024-04-09 RX ORDER — PROPOFOL 10 MG/ML
VIAL (ML) INTRAVENOUS
Status: DISCONTINUED | OUTPATIENT
Start: 2024-04-09 | End: 2024-04-09

## 2024-04-09 RX ADMIN — Medication 20 MG: at 09:04

## 2024-04-09 RX ADMIN — SODIUM CHLORIDE: 0.9 INJECTION, SOLUTION INTRAVENOUS at 09:04

## 2024-04-09 RX ADMIN — Medication 20 MG: at 10:04

## 2024-04-09 RX ADMIN — Medication 60 MG: at 09:04

## 2024-04-09 NOTE — H&P
GASTROENTEROLOGY PRE-PROCEDURE H&P NOTE  Patient Name: Cyrus Batres Jr.  Patient MRN: [id number]  Patient : 1950    Service date: 2024    PCP: Maico Patterson MD    No chief complaint on file.      HPI: Patient is a 73 y.o. male with PMHx as below here for evaluation of peg swap and dysphagia.     Past Medical History:  Past Medical History:   Diagnosis Date    Abnormal CT scan, neck 2022    Allergy     pollen extracts    Atrial fibrillation     Chronic anticoagulation     Diabetes mellitus, type 2     GRIFFIN (dyspnea on exertion)     Encounter for blood transfusion     GERD (gastroesophageal reflux disease)     Gout, unspecified     Hypertension     Hypothyroidism 2022    Iron deficiency anemia 2023    Iron deficiency anemia 2023    Larynx neoplasm malignant 2020    PEG (percutaneous endoscopic gastrostomy) adjustment/replacement/removal 2022    Postoperative hypothyroidism 2022    Tongue cancer     Tracheostomy dependence     Type 2 diabetes mellitus, without long-term current use of insulin 2022    Unspecified glaucoma         Past Surgical History:  Past Surgical History:   Procedure Laterality Date    CATARACT EXTRACTION W/  INTRAOCULAR LENS IMPLANT Right 2023    Procedure: CEIOL OD  NPO-peg;  Surgeon: Stoney Munoz MD;  Location: [facility] OR;  Service: Ophthalmology;  Laterality: Right;    CATARACT EXTRACTION W/  INTRAOCULAR LENS IMPLANT Left 10/18/2023    Procedure: CEIOL OS npo peg;  Surgeon: Stoney Munoz MD;  Location: [facility] OR;  Service: Ophthalmology;  Laterality: Left;    DIRECT LARYNGOBRONCHOSCOPY N/A 2021    Procedure: LARYNGOSCOPY, DIRECT, WITH BRONCHOSCOPY;  Surgeon: Flex Espinosa MD;  [facility];  Service: ENT;  Laterality: N/A;    DIRECT LARYNGOSCOPY  2022    Procedure: LARYNGOSCOPY, DIRECT;  Surgeon: Jesse James MD;  Location: [facility] OR [address] [place] [address];  Service: ENT;;    DISSECTION OF NECK Bilateral  1/6/2022    Procedure: DISSECTION, NECK;  Surgeon: Jesse James MD;  Location: [facility] OR [facility];  Service: ENT;  Laterality: Bilateral;    DISSECTION OF NECK Bilateral 11/9/2022    Procedure: DISSECTION, NECK;  Surgeon: Jesse James MD;  Location: [facility] OR [facility];  Service: ENT;  Laterality: Bilateral;    FLAP PROCEDURE Right 1/6/2022    Procedure: CREATION, FREE FLAP;  Surgeon: Alise Hart MD;  Location: [facility] OR [facility];  Service: ENT;  Laterality: Right;  Ischemic start 1351  Ischemic stop 1502    FLAP PROCEDURE Left 11/9/2022    Procedure: CREATION, FREE FLAP, ALT;  Surgeon: Alise Hart MD;  Location: [facility] OR [facility];  Service: ENT;  Laterality: Left;    GLOSSECTOMY Bilateral 11/9/2022    Procedure: TOTAL GLOSSECTOMY;  Surgeon: Jesse James MD;  [facility];  Service: ENT;  Laterality: Bilateral;    INSERTION OF TUNNELED CENTRAL VENOUS CATHETER (CVC) WITH SUBCUTANEOUS PORT N/A 6/9/2022    Procedure: [id number]-A-[id number];  Surgeon: Jesus Viera MD;  Location: [facility] OR;  Service: General;  Laterality: N/A;    INSERTION OF TUNNELED CENTRAL VENOUS CATHETER (CVC) WITH SUBCUTANEOUS PORT Right 1/12/2023    Procedure: [id number]-[id number];  Surgeon: Abdulaziz Le Jr., MD;  Location: [facility] OR;  Service: General;  Laterality: Right;    LARYNGECTOMY N/A 1/6/2022    Procedure: LARYNGECTOMY;  Surgeon: Jesse James MD;  [facility];  Service: ENT;  Laterality: N/A;    LARYNGOSCOPY N/A 8/4/2020    Procedure: Suspension microlaryngoscopy with biopsy, possible KTP laser treatment/excision;  Surgeon: Stew Noel MD;  [facility];  Service: ENT;  Laterality: N/A;  Microscope, telescopes, tower, microinstruments, KTP laser, rep conf# [id number] IC 7/28.    LARYNGOSCOPY N/A 3/16/2021    Procedure: Suspension microlaryngoscopy with excision of lesion, possible CO2 laser;  Surgeon: Stew Noel MD;  Location: [facility] OR [address] [place] [address];  Service:  ENT;  Laterality: N/A;  Microscope, telescopes, tower, microinstruments, CO2 laser, rep conf# [id number] IC 3/4.    LARYNGOSCOPY N/A 4/1/2021    Procedure: Suspension microlaryngoscopy with KTP laser excision of lesion;  Surgeon: Stew Noel MD;  [facility];  Service: ENT;  Laterality: N/A;  Microscope, telescopes, tower, microinstruments, 70 degree scope, vocal fold , KTP laser, rep conf# [id number] BC    LARYNGOSCOPY N/A 12/9/2021    Procedure: Suspension microlaryngoscopy with biopsy;  Surgeon: Stew Noel MD;  [facility];  Service: ENT;  Laterality: N/A;  Microscope, telescopes, tower, microinstruments    LARYNGOSCOPY N/A 1/6/2022    Procedure: LARYNGOSCOPY;  Surgeon: Jesse James MD;  [facility];  Service: ENT;  Laterality: N/A;    LARYNGOSCOPY N/A 4/27/2022    Procedure: LARYNGOSCOPY WITH BIOPSY;  Surgeon: Jesse James MD;  [facility];  Service: ENT;  Laterality: N/A;    MICROLARYNGOSCOPY N/A 3/17/2020    Procedure: MICROLARYNGOSCOPY;  Surgeon: Jung Xiao MD;  Location: [facility];  Service: ENT;  Laterality: N/A;  Laser Microlaryngoscopy  NEED TO SCHEDULE LASER from [facility] [id number]    PHARYNGECTOMY  11/9/2022    Procedure: TOTAL PHARYNGECTOMY;  Surgeon: Jesse James MD;  [facility];  Service: ENT;;    REIMPLANTATION OF PARATHYROID TISSUE N/A 1/6/2022    Procedure: REIMPLANTATION, PARATHYROID TISSUE;  Surgeon: Jesse James MD;  [facility];  Service: ENT;  Laterality: N/A;    SKIN SPLIT GRAFT Right 11/9/2022    Procedure: APPLICATION, GRAFT, SKIN, SPLIT-THICKNESS;  Surgeon: Jesse James MD;  [facility];  Service: ENT;  Laterality: Right;    THYROIDECTOMY  1/6/2022    Procedure: THYROIDECTOMY;  Surgeon: Jesse James MD;  Location: [facility] OR [address] [place] [address];  Service: ENT;;    TRACHEOSTOMY N/A 12/27/2021    Procedure: CREATION,  TRACHEOSTOMY;  Surgeon: Flex Espinosa MD;  Location: [facility] OR [address] [place] [address];  Service: ENT;  Laterality: N/A;        Home Medications:  Medications Prior to Admission   Medication Sig Dispense Refill Last Dose    esomeprazole (NEXIUM) 40 MG capsule 40 mg before breakfast.   4/9/2024    levothyroxine (SYNTHROID) 100 MCG tablet 1 tablet (100 mcg total) by Per G Tube route before breakfast. 30 tablet 11 4/9/2024    acetaminophen (TYLENOL) 325 MG tablet Take 325 mg by mouth every 6 (six) hours as needed for Pain.       calcium carbonate 500 mg/5 mL (1,250 mg/5 mL) Take 20 mls four times daily with meals and nightly. (Patient taking differently: 40 mLs by Per G Tube route 3 (three) times daily. Take 20 mls four times daily with meals and nightly.) 2300 mL 12     diclofenac sodium (VOLTAREN ARTHRITIS PAIN) 1 % Gel Apply 2 g topically once daily. Apply couple of times a day on the affected arthritis pain. 100 g 1     famotidine (PEPCID) 40 mg/5 mL (8 mg/mL) suspension Give 2.5 mLs (20 mg total) by Per G Tube route 2 (two) times daily. (Patient not taking: Reported on 2/6/2024) 50 mL 11     ibuprofen (ADVIL,MOTRIN) 200 MG tablet Take 200 mg by mouth every 6 (six) hours as needed for Pain.       lactose-reduced food with fibr (JEVITY 1.5 TRISHA) 0.06 gram-1.5 kcal/mL Liqd 6 cartons per day via peg.  Flush 60ml before and after each bolus (Patient taking differently: 240 mLs by Per G Tube route 5 (five) times daily. 6 cartons per day via peg.  Flush 60ml before and after each bolus) 180 each 11                Review of patient's allergies indicates:   Allergen Reactions    Lovastatin Itching    Pollen extracts     Lovastatin Rash     Not confirmed but pt skeptical       Social History:   Social History     Occupational History    Occupation: [employer]     Employer: [employer]   Tobacco Use    Smoking status: Never    Smokeless tobacco: Never   Substance and Sexual Activity    Alcohol use: Not Currently     Comment: occasional     "Drug use: No    Sexual activity: Yes     Partners: Female       Family History:   Family History   Problem Relation Age of Onset    Abnormal EKG Mother     Diabetes Father     Heart disease Father     Hypertension Father        Review of Systems:  A 10 point review of systems was performed and was normal, except as mentioned in the HPI, including constitutional, HEENT, heme, lymph, cardiovascular, respiratory, gastrointestinal, genitourinary, neurologic, endocrine, psychiatric and musculoskeletal.      OBJECTIVE:    Physical Exam:  24 Hour Vital Sign Ranges: Temp:  [96.9 °F (36.1 °C)] 96.9 °F (36.1 °C)  Pulse:  [56] 56  SpO2:  [100 %] 100 %  BP: (125)/(60) 125/60  Most recent vitals: /60 (BP Location: Left arm, Patient Position: Lying)   Pulse (!) 56   Temp 96.9 °F (36.1 °C) (Oral)   SpO2 100% Comment: ra   GEN: well-developed, well-nourished, awake and alert, non-toxic appearing adult  HEENT: PERRL, sclera anicteric, oral mucosa pink and moist without lesion  NECK: trachea midline; Good ROM  CV: regular rate and rhythm, no murmurs or gallops  RESP: clear to auscultation bilaterally, no wheezes, rhonci or rales  ABD: soft, non-tender, non-distended, normal bowel sounds  EXT: no swelling or edema, 2+ pulses distally  SKIN: no rashes or jaundice  PSYCH: normal affect    Labs:   No results for input(s): "WBC", "MCV", "PLT" in the last 72 hours.    Invalid input(s): "HGBAU"  No results for input(s): "NA", "K", "CL", "CO2", "BUN", "GLU" in the last 72 hours.    Invalid input(s): "CREA"  No results for input(s): "ALB" in the last 72 hours.    Invalid input(s): "ALKP", "SGOT", "SGPT", "TBIL", "DBIL", "TPRO"  No results for input(s): "PT", "INR", "PTT" in the last 72 hours.      IMPRESSION / RECOMMENDATIONS:  EGD w/ with interventions as warranted.   RIsks, benefits, alternatives discussed in detail regarding upcoming procedures and sedation. Some of the more common endoscopic complications include but not limited " to immediate or delayed perforation, bleeding, infections, pain, inadvertent injury to surrounding tissue / organs and possible need for surgical evaluation. Patient expressed understanding, all questions answered and will proceed with procedure as planned.     Matheus Cooper III  4/9/2024  9:42 AM

## 2024-04-09 NOTE — ANESTHESIA POSTPROCEDURE EVALUATION
Anesthesia Post Evaluation    Patient: Cyrus Batres Jr.    Procedure(s) Performed: Procedure(s) (LRB):  EGD (ESOPHAGOGASTRODUODENOSCOPY) (N/A)    Final Anesthesia Type: general      Patient location during evaluation: GI PACU  Patient participation: Yes- Able to Participate  Level of consciousness: awake and alert and oriented  Post-procedure vital signs: reviewed and stable  Pain management: adequate  Airway patency: patent    PONV status at discharge: No PONV  Anesthetic complications: no      Cardiovascular status: blood pressure returned to baseline, hemodynamically stable and stable  Respiratory status: unassisted, spontaneous ventilation and room air  Hydration status: euvolemic  Follow-up not needed.              Vitals Value Taken Time   /59 04/09/24 1040   Temp 36.1 °C (96.9 °F) 04/09/24 1015   Pulse 58 04/09/24 1043   Resp 14 04/09/24 1035   SpO2 100 % 04/09/24 1043   Vitals shown include unvalidated device data.      No case tracking events are documented in the log.      Pain/Alexi Score: No data recorded

## 2024-04-09 NOTE — ANESTHESIA PREPROCEDURE EVALUATION
04/09/2024  Cyrus Batres Jr. is a 73 y.o., male.      Patient Active Problem List   Diagnosis    Melanocytic nevus    Body mass index (BMI) of 29.0-29.9 in adult    Benign hypertension    Overweight    Paroxysmal atrial fibrillation    Type 2 diabetes mellitus with diabetic arthropathy, with long-term current use of insulin    Fatty liver disease, nonalcoholic    False positive serological test for hepatitis C    Hyperlipidemia    Type 2 diabetes mellitus with hyperglycemia, without long-term current use of insulin    Gastroesophageal reflux disease without esophagitis    Type 2 diabetes mellitus with hyperglycemia    Vitamin B12 deficiency    Hyperuricemia    Hypovitaminosis D    Proteinuria    On enteral nutrition    Larynx cancer    Dehydration    NSVT (nonsustained ventricular tachycardia)    Adverse effect of adrenal cortical steroids, sequela    S/P laryngectomy    Hypocalcemia    Aphonia    Dysphagia, pharyngoesophageal    Acute pain of both shoulders    Bilateral arm weakness    Oral bleeding    Malignant neoplasm of base of tongue    Advanced care planning/counseling discussion    Iron deficiency anemia due to chronic blood loss    Postoperative hypothyroidism    Pressure injury of sacral region, stage 1    Pneumatosis intestinalis    Nausea and vomiting    Neutropenia    Abnormal CT scan, neck    Open wound of neck    Open wnd of pharynx    Open wound of left thigh    Open wound of mouth    Tracheostomy in place    Malnutrition of mild degree    Type 2 diabetes mellitus, without long-term current use of insulin    Laryngeal cancer    Hypocalcemia    Hyperbilirubinemia    Elevated transaminase level    Hyponatremia    PEG (percutaneous endoscopic gastrostomy) adjustment/replacement/removal    Hypothyroidism    Dehydration    Pharyngocutaneous fistula    Protein malnutrition    Elevated alkaline  phosphatase level    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    Scar conditions and fibrosis of skin    Decreased ROM of neck    Iron deficiency anemia    Chemotherapy-induced peripheral neuropathy    Phantom pain    Chronic low back pain    Palliative care by specialist    Cancer related pain       Past Surgical History:   Procedure Laterality Date    CATARACT EXTRACTION W/  INTRAOCULAR LENS IMPLANT Right 8/16/2023    Procedure: CEIOL OD  NPO-peg;  Surgeon: Stoney Munoz MD;  Location: [facility] OR;  Service: Ophthalmology;  Laterality: Right;    CATARACT EXTRACTION W/  INTRAOCULAR LENS IMPLANT Left 10/18/2023    Procedure: CEIOL OS npo peg;  Surgeon: Stoney Munoz MD;  Location: [facility] OR;  Service: Ophthalmology;  Laterality: Left;    DIRECT LARYNGOBRONCHOSCOPY N/A 12/27/2021    Procedure: LARYNGOSCOPY, DIRECT, WITH BRONCHOSCOPY;  Surgeon: Flex Espinosa MD;  Location: [facility] OR [address] [place] [address];  Service: ENT;  Laterality: N/A;    DIRECT LARYNGOSCOPY  11/9/2022    Procedure: LARYNGOSCOPY, DIRECT;  Surgeon: Jesse James MD;  Location: [address];  Service: ENT;;    DISSECTION OF NECK Bilateral 1/6/2022    Procedure: DISSECTION, NECK;  Surgeon: Jesse James MD;  [facility];  Service: ENT;  Laterality: Bilateral;    DISSECTION OF NECK Bilateral 11/9/2022    Procedure: DISSECTION, NECK;  Surgeon: Jesse James MD;  [facility];  Service: ENT;  Laterality: Bilateral;    FLAP PROCEDURE Right 1/6/2022    Procedure: CREATION, FREE FLAP;  Surgeon: Alise Hart MD;  [facility];  Service: ENT;  Laterality: Right;  Ischemic start 1351  Ischemic stop 1502    FLAP PROCEDURE Left 11/9/2022    Procedure: CREATION, FREE FLAP, ALT;  Surgeon: Alise Hart MD;  [facility];  Service: ENT;  Laterality: Left;    GLOSSECTOMY Bilateral 11/9/2022    Procedure: TOTAL GLOSSECTOMY;  Surgeon: Jesse James MD;  Location: [facility] OR [address] [place] [address];  Service: ENT;   Laterality: Bilateral;    INSERTION OF TUNNELED CENTRAL VENOUS CATHETER (CVC) WITH SUBCUTANEOUS PORT N/A 6/9/2022    Procedure: [id number];  Surgeon: Jesus Viera MD;  Location: [facility] OR;  Service: General;  Laterality: N/A;    INSERTION OF TUNNELED CENTRAL VENOUS CATHETER (CVC) WITH SUBCUTANEOUS PORT Right 1/12/2023    Procedure: [id number];  Surgeon: Abdulaziz Le Jr., MD;  Location: [facility] OR;  Service: General;  Laterality: Right;    LARYNGECTOMY N/A 1/6/2022    Procedure: LARYNGECTOMY;  Surgeon: Jesse James MD;  Location: [facility] OR 2ND FLR;  Service: ENT;  Laterality: N/A;    LARYNGOSCOPY N/A 8/4/2020    Procedure: Suspension microlaryngoscopy with biopsy, possible KTP laser treatment/excision;  Surgeon: Stew Noel MD;  Location: [facility];  Service: ENT;  Laterality: N/A;  Microscope, telescopes, tower, microinstruments, KTP laser, rep conf# [id number] IC 7/28.    LARYNGOSCOPY N/A 3/16/2021    Procedure: Suspension microlaryngoscopy with excision of lesion, possible CO2 laser;  Surgeon: Stew Noel MD;  Location: [facility] OR [facility];  Service: ENT;  Laterality: N/A;  Microscope, telescopes, tower, microinstruments, CO2 laser, rep conf# [id number] IC 3/4.    LARYNGOSCOPY N/A 4/1/2021    Procedure: Suspension microlaryngoscopy with KTP laser excision of lesion;  Surgeon: Stew Noel MD;  Location: [facility] OR [facility];  Service: ENT;  Laterality: N/A;  Microscope, telescopes, tower, microinstruments, 70 degree scope, vocal fold , KTP laser, rep conf# [id number] BC    LARYNGOSCOPY N/A 12/9/2021    Procedure: Suspension microlaryngoscopy with biopsy;  Surgeon: Stew Noel MD;  Location: [facility] OR 2[facility];  Service: ENT;  Laterality: N/A;  Microscope, telescopes, tower, microinstruments    LARYNGOSCOPY N/A 1/6/2022    Procedure: LARYNGOSCOPY;  Surgeon: Jesse James MD;  Location: [facility] OR [address] [place] [address];  Service: ENT;  Laterality: N/A;     LARYNGOSCOPY N/A 4/27/2022    Procedure: LARYNGOSCOPY WITH BIOPSY;  Surgeon: Jesse James MD;  Location: [facility] OR 2ND FLR;  Service: ENT;  Laterality: N/A;    MICROLARYNGOSCOPY N/A 3/17/2020    Procedure: MICROLARYNGOSCOPY;  Surgeon: Jung Xiao MD;  Location: [facility] OR;  Service: ENT;  Laterality: N/A;  Laser Microlaryngoscopy  NEED TO SCHEDULE LASER from [employer] [id number]    PHARYNGECTOMY  11/9/2022    Procedure: TOTAL PHARYNGECTOMY;  Surgeon: Jesse James MD;  Location: [facility] OR 2ND [facility]LR;  Service: ENT;;    REIMPLANTATION OF PARATHYROID TISSUE N/A 1/6/2022    Procedure: REIMPLANTATION, PARATHYROID TISSUE;  Surgeon: Jesse James MD;  Location: [facility] OR 2ND [facility]LR;  Service: ENT;  Laterality: N/A;    SKIN SPLIT GRAFT Right 11/9/2022    Procedure: APPLICATION, GRAFT, SKIN, SPLIT-THICKNESS;  Surgeon: Jesse James MD;  Location: [facility];  Service: ENT;  Laterality: Right;    THYROIDECTOMY  1/6/2022    Procedure: THYROIDECTOMY;  Surgeon: Jesse James MD;  Location: [facility];  Service: ENT;;    TRACHEOSTOMY N/A 12/27/2021    Procedure: CREATION, TRACHEOSTOMY;  Surgeon: Flex Espinosa MD;  Location: [facility] OR 2ND [facility]LR;  Service: ENT;  Laterality: N/A;        Tobacco Use:  The patient  reports that he has never smoked. He has never used smokeless tobacco.     Results for orders placed or performed during the hospital encounter of 04/04/24   EKG 12-lead    Collection Time: 04/04/24 10:12 AM   Result Value Ref Range    QRS Duration 80 ms    [facility] QTC Calculation 403 ms    Narrative    Test Reason : Z01.818,    Vent. Rate : 059 BPM     Atrial Rate : 059 BPM     P-R Int : 168 ms          QRS Dur : 080 ms      QT Int : 408 ms       P-R-T Axes : 047 040 -01 degrees     QTc Int : 403 ms    Sinus bradycardia  Otherwise normal ECG  When compared with ECG of 22-NOV-2022 02:11,  Vent. rate has decreased BY  49 BPM  Inverted T waves have replaced nonspecific T wave abnormality in  Inferior  leads    Referred By:             Confirmed By:              Lab Results   Component Value Date    WBC 4.75 03/15/2024    HGB 11.9 (L) 03/15/2024    HCT 35.7 (L) 03/15/2024    MCV 97 03/15/2024     (L) 03/15/2024     BMP  Lab Results   Component Value Date     03/15/2024    K 4.0 03/15/2024     03/15/2024    CO2 28 03/15/2024    BUN 27 (H) 03/15/2024    CREATININE 1.2 03/15/2024    CALCIUM 7.1 (L) 03/15/2024    ANIONGAP 9 03/15/2024     (H) 03/15/2024     (H) 01/09/2024     (H) 12/18/2023       Results for orders placed during the hospital encounter of 11/23/22    Echo    Interpretation Summary  · The left ventricle is normal in size with normal systolic function.  · The estimated ejection fraction is 65%.  · Normal left ventricular diastolic function.  · Normal right ventricular size with normal right ventricular systolic function.  · Mild-to-moderate mitral regurgitation.  · Mild pulmonic regurgitation.  · Normal central venous pressure (3 mmHg).  · The estimated PA systolic pressure is 21 mmHg.  · Trivial posterior pericardial effusion. Just at base         Pre-op Assessment    I have reviewed the Patient Summary Reports.     I have reviewed the Nursing Notes. I have reviewed the NPO Status.   I have reviewed the Medications.     Review of Systems  Anesthesia Hx:  No problems with previous Anesthesia   Neg history of prior surgery.          Denies Family Hx of Anesthesia complications.    Denies Personal Hx of Anesthesia complications.                    Social:  No Alcohol Use, Non-Smoker       Hematology/Oncology:  Hematology Normal   Oncology Normal                               Oncology Comments: Tongue and laryngeal cancer     EENT/Dental:   S/p glossectomy, radical neck and laryngectomy           Cardiovascular:     Hypertension    Dysrhythmias atrial fibrillation                                   Pulmonary:      Shortness of breath                   Renal/:  Renal/ Normal                 Hepatic/GI:     GERD Liver disease: Fatty liver.  Pt has had esophagus resected , has a blind pouch          Musculoskeletal:     Gout            Neurological:        Peripheral Neuropathy                          Endocrine:  Diabetes, type 2 Hypothyroidism          Dermatological:  Skin Normal    Psych:  Psychiatric Normal                    Physical Exam  General: Well nourished and Alert    Airway:  Mallampati: unable to assess   Mouth Opening: < 3 cm  TM Distance: Normal  Neck ROM: Normal ROM  Pre-Existing Airway: Tracheal Stoma    Dental:  Intact    Chest/Lungs:  Clear to auscultation, Normal Respiratory Rate    Heart:  Rate: Normal  Rhythm: Regular Rhythm  Sounds: Normal        Anesthesia Plan  Type of Anesthesia, risks & benefits discussed:    Anesthesia Type: Gen Natural Airway  Intra-op Monitoring Plan: Standard ASA Monitors  Induction:  IV  Informed Consent: Informed consent signed with the Patient and all parties understand the risks and agree with anesthesia plan.  All questions answered. Patient consented to blood products? Yes  ASA Score: 3    Ready For Surgery From Anesthesia Perspective.     .

## 2024-04-09 NOTE — PROVATION PATIENT INSTRUCTIONS
Discharge Summary/Instructions after an Endoscopic Procedure  Patient Name: Cyrus Batres  Patient MRN: [id number]  Patient YOB: 1950 Tuesday, April 9, 2024  Matheus Cooper III, MD  RESTRICTIONS:  During your procedure today, you received medications for sedation.  These   medications may affect your judgment, balance and coordination.  Therefore,   for 24 hours, you have the following restrictions:   - DO NOT drive a car, operate machinery, make legal/financial decisions,   sign important papers or drink alcohol.    ACTIVITY:  Today: no heavy lifting, straining or running due to procedural   sedation/anesthesia.  The following day: return to full activity including work.  DIET:  Eat and drink normally unless instructed otherwise.     TREATMENT FOR COMMON SIDE EFFECTS:  - Mild abdominal pain, nausea, belching, bloating or excessive gas:  rest,   eat lightly and use a heating pad.  - Sore Throat: treat with throat lozenges and/or gargle with warm salt   water.  - Because air was used during the procedure, expelling large amounts of air   from your rectum or belching is normal.  - If a bowel prep was taken, you may not have a bowel movement for 1-3 days.    This is normal.  SYMPTOMS TO WATCH FOR AND REPORT TO YOUR PHYSICIAN:  1. Abdominal pain or bloating, other than gas cramps.  2. Chest pain.  3. Back pain.  4. Signs of infection such as: chills or fever occurring within 24 hours   after the procedure.  5. Rectal bleeding, which would show as bright red, maroon, or black stools.   (A tablespoon of blood from the rectum is not serious, especially if   hemorrhoids are present.)  6. Vomiting.  7. Weakness or dizziness.  GO DIRECTLY TO THE NEAREST EMERGENCY ROOM IF YOU HAVE ANY OF THE FOLLOWING:      Difficulty breathing              Chills and/or fever over 101 F   Persistent vomiting and/or vomiting blood   Severe abdominal pain   Severe chest pain   Black, tarry stools   Bleeding- more than one  tablespoon   Any other symptom or condition that you feel may need urgent attention  Your doctor recommends these additional instructions:  If any biopsies were taken, your doctors clinic will contact you in 1 to 2   weeks with any results.  - Patient has a contact number available for emergencies.  The signs and   symptoms of potential delayed complications were discussed with the   patient.  Return to normal activities tomorrow.  Written discharge   instructions were provided to the patient.   - Discharge patient to home (with escort).   - Ok to use PEG for both feeds and meds starting today. If PEG gets   dislodged, very important to replace immediately or will need to be   replaced surgically given esophagus is not patent.   -STRESSED TO PATIENT / FAMILY TO RETURN TO ER ASAP IF PEG EVER FALLS OUT;   ALSO STRESSED TO NEVER LET ANYONE PLACE A NGT/OGT OR ANYTHING INTO STOMACH   OTHER THAN PEG  For questions, problems or results please call your physician - Matheus Cooper III, MD at Work:  [phone].  Critical access hospital, EMERGENCY ROOM PHONE NUMBER: [phone]  IF A COMPLICATION OR EMERGENCY SITUATION ARISES AND YOU ARE UNABLE TO REACH   YOUR PHYSICIAN - GO DIRECTLY TO THE EMERGENCY ROOM.  Matheus Cooper III, MD  4/9/2024 10:10:52 AM  This report has been verified and signed electronically.  Dear patient,  As a result of recent federal legislation (The Federal Cures Act), you may   receive lab or pathology results from your procedure in your [facility]   account before your physician is able to contact you. Your physician or   their representative will relay the results to you with their   recommendations at their soonest availability.  Thank you,  PROVATION

## 2024-04-09 NOTE — TRANSFER OF CARE
Anesthesia Transfer of Care Note    Patient: Cyrus Batres Jr.    Procedure(s) Performed: Procedure(s) (LRB):  EGD (ESOPHAGOGASTRODUODENOSCOPY) (N/A)    Patient location: GI    Anesthesia Type: general    Transport from OR: Transported from OR on room air with adequate spontaneous ventilation    Post pain: adequate analgesia    Post assessment: tolerated procedure well and no apparent anesthetic complications    Post vital signs: stable    Level of consciousness: sedated    Nausea/Vomiting: no nausea/vomiting    Complications: none    Transfer of care protocol was followed      Last vitals: Visit Vitals  /60 (BP Location: Left arm, Patient Position: Lying)   Pulse (!) 56   Temp 36.1 °C (96.9 °F) (Oral)   SpO2 100%

## 2024-04-24 LAB
OHS QRS DURATION: 80 MS
OHS QTC CALCULATION: 403 MS

## 2024-05-07 ENCOUNTER — OFFICE VISIT (OUTPATIENT)
Dept: SURGICAL ONCOLOGY | Facility: CLINIC | Age: 74
End: 2024-05-07
Payer: MEDICARE

## 2024-05-07 VITALS — WEIGHT: 149.69 LBS | HEIGHT: 66 IN | BODY MASS INDEX: 24.06 KG/M2

## 2024-05-07 DIAGNOSIS — C01 MALIGNANT NEOPLASM OF BASE OF TONGUE: Primary | ICD-10-CM

## 2024-05-07 DIAGNOSIS — C32.9 LARYNX CANCER: ICD-10-CM

## 2024-05-07 PROCEDURE — 1101F PT FALLS ASSESS-DOCD LE1/YR: CPT | Mod: CPTII,S$GLB,, | Performed by: OTOLARYNGOLOGY

## 2024-05-07 PROCEDURE — 99999 PR PBB SHADOW E&M-EST. PATIENT-LVL III: CPT | Mod: PBBFAC,,, | Performed by: OTOLARYNGOLOGY

## 2024-05-07 PROCEDURE — 31575 DIAGNOSTIC LARYNGOSCOPY: CPT | Mod: S$GLB,,, | Performed by: OTOLARYNGOLOGY

## 2024-05-07 PROCEDURE — 3288F FALL RISK ASSESSMENT DOCD: CPT | Mod: CPTII,S$GLB,, | Performed by: OTOLARYNGOLOGY

## 2024-05-07 PROCEDURE — 1126F AMNT PAIN NOTED NONE PRSNT: CPT | Mod: CPTII,S$GLB,, | Performed by: OTOLARYNGOLOGY

## 2024-05-07 PROCEDURE — 99213 OFFICE O/P EST LOW 20 MIN: CPT | Mod: 25,S$GLB,, | Performed by: OTOLARYNGOLOGY

## 2024-05-07 PROCEDURE — 3008F BODY MASS INDEX DOCD: CPT | Mod: CPTII,S$GLB,, | Performed by: OTOLARYNGOLOGY

## 2024-05-07 PROCEDURE — 1157F ADVNC CARE PLAN IN RCRD: CPT | Mod: CPTII,S$GLB,, | Performed by: OTOLARYNGOLOGY

## 2024-05-07 PROCEDURE — 3072F LOW RISK FOR RETINOPATHY: CPT | Mod: CPTII,S$GLB,, | Performed by: OTOLARYNGOLOGY

## 2024-05-07 PROCEDURE — 3044F HG A1C LEVEL LT 7.0%: CPT | Mod: CPTII,S$GLB,, | Performed by: OTOLARYNGOLOGY

## 2024-05-07 PROCEDURE — 1159F MED LIST DOCD IN RCRD: CPT | Mod: CPTII,S$GLB,, | Performed by: OTOLARYNGOLOGY

## 2024-05-07 NOTE — PROGRESS NOTES
CC: Surveillance    Oncology History   Larynx cancer   8/4/2020 Initial Diagnosis    Larynx neoplasm malignant     8/6/2020 Tumor Conference    His case was discussed at the Multidisciplinary Head and Neck Team Planning Meeting.    Representatives from Medical Oncology, Radiation Oncology, Head and Neck Surgical Oncology, Psychosocial Oncology, and Speech and Language Pathology discussed the case with the following recommendations:    1) definitive radiation              8/6/2020 Cancer Staged    Staging form: Larynx - Glottis, AJCC 8th Edition  - Clinical stage from 8/6/2020: Stage II (cT2, cN0, cM0)     8/6/2020 Cancer Staged    Cancer Staging  Larynx neoplasm malignant  Staging form: Larynx - Glottis, AJCC 8th Edition  - Clinical stage from 8/6/2020: Stage II (cT2, cN0, cM0) - Signed by Fariha Muñoz NP on 8/6/2020 12/16/2021 Tumor Conference    His case was discussed at the Multidisciplinary Head and Neck Team Planning Meeting.    Representatives from Medical Oncology, Radiation Oncology, Head and Neck Surgical Oncology, Psychosocial Oncology, and Speech and Language Pathology discussed the case with the following recommendations:    1) TL  2) oncology referral  3) psychology referral            12/27/2021 Surgery    1. DL And tracheostomy  2. PEG     1/6/2022 Surgery    1. Diagnostic direct laryngoscopy  2. Bilateral modified neck dissection of levels 2 through 4  3. Total laryngectomy  4. Total thyroidectomy with bilateral paratracheal/upper mediastinal neck dissection  5. Autotransplantation of left inferior parathyroid into left sternocleidomastoid muscle   6. Left anterolateral thigh free flap for closure of total laryngectomy neck skin defect  7. Advancement flap for closure of left thigh donor site advanced area was 25 x 10 cm     1/20/2022 Cancer Staged    Staging form: Larynx - Glottis, AJCC 8th Edition  - Pathologic stage from 1/20/2022: Stage LOU (pT4a, pN0, cM0)     5/30/2022 Cancer Staged     Staging form: Pharynx - P16 Negative Oropharynx, AJCC 8th Edition  - Clinical: Stage II (rcT2, cN0, cM0)     1/23/2023 -  Chemotherapy    Treatment Summary   Plan Name: OP HEAD NECK CISPLATIN WEEKLY + RADIOTHERAPY  Treatment Goal: Curative  Status: Active  Start Date: 1/23/2023  End Date: 3/6/2023  Provider: Venu Tamez MD  Chemotherapy: CISplatin (Platinol) 40 mg/m2 = 66 mg in sodium chloride 0.9% 631 mL chemo infusion, 40 mg/m2 = 66 mg, Intravenous, Clinic/HOD 1 time, 7 of 7 cycles  Administration: 66 mg (1/23/2023), 66 mg (2/13/2023), 66 mg (2/6/2023), 66 mg (2/20/2023), 66 mg (2/27/2023), 66 mg (3/6/2023)     Malignant neoplasm of base of tongue   5/20/2022 Cancer Staged    Staging form: Pharynx - P16 Negative Oropharynx, AJCC 8th Edition  - Clinical stage from 5/20/2022: Stage II (cT2, cN0, cM0, p16-)     6/22/2022 Initial Diagnosis    Primary cancer of base of tongue     7/11/2022 - 8/26/2022 Chemotherapy    Treatment Summary   Plan Name: OP HEAD NECK PEMBROLIZUMAB CARBOPLATIN FLUOROURACIL Q3W FOLLOWED BY MAINTENANCE PEMBROLIZUMAB 400 MG Q6W  Treatment Goal: Palliative  Status: Inactive  Start Date: 7/11/2022  End Date: 8/26/2022  Provider: Aurash Khoobehi, MD  Chemotherapy: CARBOplatin (PARAPLATIN) 440 mg in sodium chloride 0.9% 544 mL chemo infusion, 440 mg (100 % of original dose 438.5 mg), Intravenous, Clinic/HOD 1 time, 3 of 6 cycles  Dose modification:   (original dose 438.5 mg, Cycle 1)  Administration: 440 mg (7/11/2022), 435 mg (8/1/2022), 510 mg (8/22/2022)     1/23/2023 -  Chemotherapy    Treatment Summary   Plan Name: OP HEAD NECK CISPLATIN WEEKLY + RADIOTHERAPY  Treatment Goal: Curative  Status: Active  Start Date: 1/23/2023  End Date: 3/6/2023  Provider: Venu Tamez MD  Chemotherapy: CISplatin (Platinol) 40 mg/m2 = 66 mg in sodium chloride 0.9% 631 mL chemo infusion, 40 mg/m2 = 66 mg, Intravenous, [facility] 1 time, 7 of 7 cycles  Administration: 66 mg (1/23/2023), 66 mg  (2/13/2023), 66 mg (2/6/2023), 66 mg (2/20/2023), 66 mg (2/27/2023), 66 mg (3/6/2023)           HPI   73 y.o. male presents with the above treatment history.  No  new complaints.    Review of Systems   Constitutional: Negative for fatigue and unexpected weight change.   HENT: Per HPI.  Eyes: Negative for visual disturbance.   Respiratory: Negative for shortness of breath, hemoptysis   Cardiovascular: Negative for chest pain and palpitations.   Musculoskeletal: Negative for decreased ROM, back pain.   Skin: Negative for rash, sunburn, itching.   Neurological: Negative for dizziness and seizures.   Hematological: Negative for adenopathy. Does not bruise/bleed easily.   Endocrine: Negative for rapid weight loss/weight gain, heat/cold intolerance.     Past Medical History   Patient Active Problem List   Diagnosis    Melanocytic nevus    Body mass index (BMI) of 29.0-29.9 in adult    Benign hypertension    Overweight    Paroxysmal atrial fibrillation    Type 2 diabetes mellitus with diabetic arthropathy, with long-term current use of insulin    Fatty liver disease, nonalcoholic    False positive serological test for hepatitis C    Hyperlipidemia    Type 2 diabetes mellitus with hyperglycemia, without long-term current use of insulin    Gastroesophageal reflux disease without esophagitis    Type 2 diabetes mellitus with hyperglycemia    Vitamin B12 deficiency    Hyperuricemia    Hypovitaminosis D    Proteinuria    On enteral nutrition    Larynx cancer    Dehydration    NSVT (nonsustained ventricular tachycardia)    Adverse effect of adrenal cortical steroids, sequela    S/P laryngectomy    Hypocalcemia    Aphonia    Dysphagia, pharyngoesophageal    Acute pain of both shoulders    Bilateral arm weakness    Oral bleeding    Malignant neoplasm of base of tongue    Advanced care planning/counseling discussion    Iron deficiency anemia due to chronic blood loss    Postoperative hypothyroidism    Pressure injury of sacral  region, stage 1    Pneumatosis intestinalis    Nausea and vomiting    Neutropenia    Abnormal CT scan, neck    Open wound of neck    Open wnd of pharynx    Open wound of left thigh    Open wound of mouth    Tracheostomy in place    Malnutrition of mild degree    Type 2 diabetes mellitus, without long-term current use of insulin    Laryngeal cancer    Hypocalcemia    Hyperbilirubinemia    Elevated transaminase level    Hyponatremia    PEG (percutaneous endoscopic gastrostomy) adjustment/replacement/removal    Hypothyroidism    Dehydration    Pharyngocutaneous fistula    Protein malnutrition    Elevated alkaline phosphatase level    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    Scar conditions and fibrosis of skin    Decreased ROM of neck    Iron deficiency anemia    Chemotherapy-induced peripheral neuropathy    Phantom pain    Chronic low back pain    Palliative care by specialist    Cancer related pain           Past Surgical History   Past Surgical History:   Procedure Laterality Date    CATARACT EXTRACTION W/  INTRAOCULAR LENS IMPLANT Right 8/16/2023    Procedure: CEIOL OD  NPO-peg;  Surgeon: Stoney Munoz MD;  Location: [facility] OR;  Service: Ophthalmology;  Laterality: Right;    CATARACT EXTRACTION W/  INTRAOCULAR LENS IMPLANT Left 10/18/2023    Procedure: CEIOL OS npo peg;  Surgeon: Stoney Munoz MD;  Location: [facility] OR;  Service: Ophthalmology;  Laterality: Left;    DIRECT LARYNGOBRONCHOSCOPY N/A 12/27/2021    Procedure: LARYNGOSCOPY, DIRECT, WITH BRONCHOSCOPY;  Surgeon: Flex Espinosa MD;  Location: [facility] OR [address] [place] [address];  Service: ENT;  Laterality: N/A;    DIRECT LARYNGOSCOPY  11/9/2022    Procedure: LARYNGOSCOPY, DIRECT;  Surgeon: Jesse James MD;  Location: [facility] OR [address] [place] [address];  Service: ENT;;    DISSECTION OF NECK Bilateral 1/6/2022    Procedure: DISSECTION, NECK;  Surgeon: Jesse James MD;  Location: [facility] OR [address] [place] [address];  Service: ENT;  Laterality: Bilateral;    DISSECTION OF NECK Bilateral 11/9/2022     Procedure: DISSECTION, NECK;  Surgeon: Jesse James MD;  Location: [facility] OR [facility] FLR;  Service: ENT;  Laterality: Bilateral;    ESOPHAGOGASTRODUODENOSCOPY N/A 4/9/2024    Procedure: EGD (ESOPHAGOGASTRODUODENOSCOPY);  Surgeon: Matheus Cooper III, MD;  Location: [facility] ENDO;  Service: Endoscopy;  Laterality: N/A;    FLAP PROCEDURE Right 1/6/2022    Procedure: CREATION, FREE FLAP;  Surgeon: Alise Hart MD;  Location: [facility] OR [facility] FLR;  Service: ENT;  Laterality: Right;  Ischemic start 1351  Ischemic stop 1502    FLAP PROCEDURE Left 11/9/2022    Procedure: CREATION, FREE FLAP, ALT;  Surgeon: Alise Hart MD;  Location: [facility] OR [facility] FLR;  Service: ENT;  Laterality: Left;    GLOSSECTOMY Bilateral 11/9/2022    Procedure: TOTAL GLOSSECTOMY;  Surgeon: Jesse James MD;  [facility];  Service: ENT;  Laterality: Bilateral;    INSERTION OF TUNNELED CENTRAL VENOUS CATHETER (CVC) WITH SUBCUTANEOUS PORT N/A 6/9/2022    Procedure: [id number];  Surgeon: Jesus Viera MD;  Location: [facility] OR;  Service: General;  Laterality: N/A;    INSERTION OF TUNNELED CENTRAL VENOUS CATHETER (CVC) WITH SUBCUTANEOUS PORT Right 1/12/2023    Procedure: [id number];  Surgeon: Abdulaziz Le Jr., MD;  Location: [facility] OR;  Service: General;  Laterality: Right;    LARYNGECTOMY N/A 1/6/2022    Procedure: LARYNGECTOMY;  Surgeon: Jesse James MD;  [facility];  Service: ENT;  Laterality: N/A;    LARYNGOSCOPY N/A 8/4/2020    Procedure: Suspension microlaryngoscopy with biopsy, possible KTP laser treatment/excision;  Surgeon: Stew Noel MD;  Location: [facility] OR [facility] FLR;  Service: ENT;  Laterality: N/A;  Microscope, telescopes, tower, microinstruments, KTP laser, rep conf# [id number] IC 7/28.    LARYNGOSCOPY N/A 3/16/2021    Procedure: Suspension microlaryngoscopy with excision of lesion, possible CO2 laser;  Surgeon: Stew Noel MD;  Location: [facility] OR 2ND FLR;   Service: ENT;  Laterality: N/A;  Microscope, telescopes, tower, microinstruments, CO2 laser, rep conf# [id number] IC 3/4.    LARYNGOSCOPY N/A 4/1/2021    Procedure: Suspension microlaryngoscopy with KTP laser excision of lesion;  Surgeon: Stew Noel MD;  [facility];  Service: ENT;  Laterality: N/A;  Microscope, telescopes, tower, microinstruments, 70 degree scope, vocal fold , KTP laser, rep conf# [id number] BC    LARYNGOSCOPY N/A 12/9/2021    Procedure: Suspension microlaryngoscopy with biopsy;  Surgeon: Stew Noel MD;  [facility];  Service: ENT;  Laterality: N/A;  Microscope, telescopes, tower, microinstruments    LARYNGOSCOPY N/A 1/6/2022    Procedure: LARYNGOSCOPY;  Surgeon: Jesse James MD;  [facility];  Service: ENT;  Laterality: N/A;    LARYNGOSCOPY N/A 4/27/2022    Procedure: LARYNGOSCOPY WITH BIOPSY;  Surgeon: Jesse James MD;  [facility];  Service: ENT;  Laterality: N/A;    MICROLARYNGOSCOPY N/A 3/17/2020    Procedure: MICROLARYNGOSCOPY;  Surgeon: Jung Xiao MD;  Location: [facility];  Service: ENT;  Laterality: N/A;  Laser Microlaryngoscopy  NEED TO SCHEDULE LASER from [facility] [id number]    PHARYNGECTOMY  11/9/2022    Procedure: TOTAL PHARYNGECTOMY;  Surgeon: Jesse James MD;  [facility];  Service: ENT;;    REIMPLANTATION OF PARATHYROID TISSUE N/A 1/6/2022    Procedure: REIMPLANTATION, PARATHYROID TISSUE;  Surgeon: Jesse James MD;  [facility];  Service: ENT;  Laterality: N/A;    SKIN SPLIT GRAFT Right 11/9/2022    Procedure: APPLICATION, GRAFT, SKIN, SPLIT-THICKNESS;  Surgeon: Jesse James MD;  [facility];  Service: ENT;  Laterality: Right;    THYROIDECTOMY  1/6/2022    Procedure: THYROIDECTOMY;  Surgeon: Jesse James MD;  Location: [facility] OR [address] [place] [address];  Service: ENT;;    TRACHEOSTOMY N/A 12/27/2021    Procedure: CREATION,  TRACHEOSTOMY;  Surgeon: Flex Espinosa MD;  Location: [facility] OR [address] [place] [address];  Service: ENT;  Laterality: N/A;         Family History   Family History   Problem Relation Name Age of Onset    Abnormal EKG Mother Hayley     Diabetes Father Nicolás     Heart disease Father Nicolás     Hypertension Father Nicolás            Social History   .  Social History     Socioeconomic History    Marital status:      Spouse name: Janel Batres    Number of children: 2   Occupational History    Occupation: [employer]     Employer: [employer]   Tobacco Use    Smoking status: Never    Smokeless tobacco: Never   Substance and Sexual Activity    Alcohol use: Not Currently     Comment: occasional    Drug use: No    Sexual activity: Yes     Partners: Female   Social History Narrative    ** Merged History Encounter **         2 children from his 1st wife     Social Determinants of Health     Financial Resource Strain: Low Risk  (2/2/2024)    Overall Financial Resource Strain (CARDIA)     Difficulty of Paying Living Expenses: Not hard at all   Food Insecurity: No Food Insecurity (2/2/2024)    Hunger Vital Sign     Worried About Running Out of Food in the Last Year: Never true     Ran Out of Food in the Last Year: Never true   Transportation Needs: No Transportation Needs (2/2/2024)    PRAPARE - Transportation     Lack of Transportation (Medical): No     Lack of Transportation (Non-Medical): No   Physical Activity: Insufficiently Active (2/2/2024)    Exercise Vital Sign     Days of Exercise per Week: 3 days     Minutes of Exercise per Session: 30 min   Stress: No Stress Concern Present (2/2/2024)    [place] [place] [employer] - Occupational Stress Questionnaire     Feeling of Stress : Not at all   Housing Stability: Low Risk  (2/2/2024)    Housing Stability Vital Sign     Unable to Pay for Housing in the Last Year: No     Number of Places Lived in the Last Year: 1     Unstable Housing in the Last Year: No         Allergies    Review of patient's allergies indicates:   Allergen Reactions    Lovastatin Itching    Pollen extracts     Lovastatin Rash     Not confirmed but pt skeptical           Physical Exam     There were no vitals filed for this visit.          Body mass index is 24.16 kg/m².      General: AOx3, NAD   Respiratory:  Symmetric chest rise, normal effort  Oral Cavity:  Flap healthy. No worrisome lesions. Moderate trismus.  Oropharynx:  No masses/lesions of the posterior pharyngeal wall. Tonsillar fossa without lesions. Soft palate without masses. Midline uvula.   Neck: Stoma widely patent. Skin paddle healthy with good color and turgor.Well-healed neck incisions.No LAD. Moderate post-op, post-treatment fibrosis. Submental neck diffusely full.  Face: House Brackmann I bilaterally.    Scope via OC: No lesions of neopharynx.      Assessment/Plan  Problem List Items Addressed This Visit          Oncology    Larynx cancer     ZAY.  RTC 3 mos.          Malignant neoplasm of base of tongue - Primary

## 2024-05-09 ENCOUNTER — INFUSION (OUTPATIENT)
Dept: INFUSION THERAPY | Facility: HOSPITAL | Age: 74
End: 2024-05-09
Attending: INTERNAL MEDICINE
Payer: MEDICARE

## 2024-05-09 VITALS
SYSTOLIC BLOOD PRESSURE: 108 MMHG | RESPIRATION RATE: 18 BRPM | DIASTOLIC BLOOD PRESSURE: 61 MMHG | WEIGHT: 150 LBS | HEIGHT: 66 IN | TEMPERATURE: 97 F | BODY MASS INDEX: 24.11 KG/M2 | HEART RATE: 75 BPM

## 2024-05-09 DIAGNOSIS — E86.0 DEHYDRATION: ICD-10-CM

## 2024-05-09 DIAGNOSIS — C01 MALIGNANT NEOPLASM OF BASE OF TONGUE: ICD-10-CM

## 2024-05-09 DIAGNOSIS — C32.9 LARYNX CANCER: Primary | ICD-10-CM

## 2024-05-09 PROCEDURE — 96523 IRRIG DRUG DELIVERY DEVICE: CPT

## 2024-05-09 PROCEDURE — 63600175 PHARM REV CODE 636 W HCPCS: Performed by: INTERNAL MEDICINE

## 2024-05-09 PROCEDURE — 25000003 PHARM REV CODE 250: Performed by: INTERNAL MEDICINE

## 2024-05-09 PROCEDURE — A4216 STERILE WATER/SALINE, 10 ML: HCPCS | Performed by: INTERNAL MEDICINE

## 2024-05-09 RX ORDER — SODIUM CHLORIDE 0.9 % (FLUSH) 0.9 %
10 SYRINGE (ML) INJECTION
Status: DISCONTINUED | OUTPATIENT
Start: 2024-05-09 | End: 2024-05-09 | Stop reason: HOSPADM

## 2024-05-09 RX ORDER — HEPARIN 100 UNIT/ML
500 SYRINGE INTRAVENOUS
OUTPATIENT
Start: 2024-05-09

## 2024-05-09 RX ORDER — SODIUM CHLORIDE 0.9 % (FLUSH) 0.9 %
10 SYRINGE (ML) INJECTION
OUTPATIENT
Start: 2024-05-09

## 2024-05-09 RX ORDER — HEPARIN 100 UNIT/ML
500 SYRINGE INTRAVENOUS
Status: DISCONTINUED | OUTPATIENT
Start: 2024-05-09 | End: 2024-05-09 | Stop reason: HOSPADM

## 2024-05-09 RX ADMIN — SODIUM CHLORIDE, PRESERVATIVE FREE 10 ML: 5 INJECTION INTRAVENOUS at 03:05

## 2024-05-09 RX ADMIN — HEPARIN 500 UNITS: 100 SYRINGE at 03:05

## 2024-05-09 NOTE — PLAN OF CARE
Problem: Fatigue  Goal: Improved Activity Tolerance  5/9/2024 1449 by Elisabeth Rush, RN  Outcome: Met  5/9/2024 1449 by Elisabeth Rush, RN  Outcome: Progressing

## 2024-05-10 ENCOUNTER — PATIENT MESSAGE (OUTPATIENT)
Dept: FAMILY MEDICINE | Facility: CLINIC | Age: 74
End: 2024-05-10
Payer: MEDICARE

## 2024-05-10 DIAGNOSIS — E83.51 HYPOCALCEMIA: ICD-10-CM

## 2024-05-10 DIAGNOSIS — C32.9 LARYNX CANCER: Primary | ICD-10-CM

## 2024-05-10 NOTE — PROGRESS NOTES
Larynx cancer  -     Basic Metabolic Panel; Future; Expected date: 05/13/2024  -     PTH, intact; Future; Expected date: 05/13/2024  -     Vitamin D; Future; Expected date: 05/13/2024    Hypocalcemia  -     Basic Metabolic Panel; Future; Expected date: 05/13/2024  -     PTH, intact; Future; Expected date: 05/13/2024  -     Vitamin D; Future; Expected date: 05/13/2024

## 2024-05-13 ENCOUNTER — LAB VISIT (OUTPATIENT)
Dept: LAB | Facility: HOSPITAL | Age: 74
End: 2024-05-13
Attending: INTERNAL MEDICINE
Payer: MEDICARE

## 2024-05-13 ENCOUNTER — TELEPHONE (OUTPATIENT)
Dept: HEMATOLOGY/ONCOLOGY | Facility: CLINIC | Age: 74
End: 2024-05-13

## 2024-05-13 DIAGNOSIS — C32.9 LARYNX CANCER: ICD-10-CM

## 2024-05-13 DIAGNOSIS — E83.51 HYPOCALCEMIA: ICD-10-CM

## 2024-05-13 LAB
25(OH)D3+25(OH)D2 SERPL-MCNC: 78 NG/ML (ref 30–96)
ANION GAP SERPL CALC-SCNC: 9 MMOL/L (ref 8–16)
BUN SERPL-MCNC: 28 MG/DL (ref 8–23)
CALCIUM SERPL-MCNC: 7.8 MG/DL (ref 8.7–10.5)
CHLORIDE SERPL-SCNC: 102 MMOL/L (ref 95–110)
CO2 SERPL-SCNC: 27 MMOL/L (ref 23–29)
CREAT SERPL-MCNC: 1.2 MG/DL (ref 0.5–1.4)
EST. GFR  (NO RACE VARIABLE): >60 ML/MIN/1.73 M^2
GLUCOSE SERPL-MCNC: 193 MG/DL (ref 70–110)
POTASSIUM SERPL-SCNC: 4 MMOL/L (ref 3.5–5.1)
PTH-INTACT SERPL-MCNC: 12.9 PG/ML (ref 9–77)
SODIUM SERPL-SCNC: 138 MMOL/L (ref 136–145)

## 2024-05-13 PROCEDURE — 36415 COLL VENOUS BLD VENIPUNCTURE: CPT | Performed by: INTERNAL MEDICINE

## 2024-05-13 PROCEDURE — 80048 BASIC METABOLIC PNL TOTAL CA: CPT | Performed by: INTERNAL MEDICINE

## 2024-05-13 PROCEDURE — 83970 ASSAY OF PARATHORMONE: CPT | Performed by: INTERNAL MEDICINE

## 2024-05-13 PROCEDURE — 82306 VITAMIN D 25 HYDROXY: CPT | Performed by: INTERNAL MEDICINE

## 2024-05-13 NOTE — TELEPHONE ENCOUNTER
I spoke with pt wife and reminded her that he needs labs for his appt on Monday 5/20/24 she states that he did them this morning. I let her know that they rai Dr. Patterson's labs but not Dr. Tamez's labs. She verbalized understanding.

## 2024-05-17 ENCOUNTER — LAB VISIT (OUTPATIENT)
Dept: LAB | Facility: HOSPITAL | Age: 74
End: 2024-05-17
Attending: INTERNAL MEDICINE
Payer: MEDICARE

## 2024-05-17 ENCOUNTER — PATIENT MESSAGE (OUTPATIENT)
Facility: CLINIC | Age: 74
End: 2024-05-17
Payer: MEDICARE

## 2024-05-17 DIAGNOSIS — D50.9 IRON DEFICIENCY ANEMIA, UNSPECIFIED IRON DEFICIENCY ANEMIA TYPE: ICD-10-CM

## 2024-05-17 LAB
ALBUMIN SERPL BCP-MCNC: 4.4 G/DL (ref 3.5–5.2)
ALP SERPL-CCNC: 124 U/L (ref 55–135)
ALT SERPL W/O P-5'-P-CCNC: 35 U/L (ref 10–44)
ANION GAP SERPL CALC-SCNC: 6 MMOL/L (ref 8–16)
AST SERPL-CCNC: 32 U/L (ref 10–40)
BASOPHILS # BLD AUTO: 0.02 K/UL (ref 0–0.2)
BASOPHILS NFR BLD: 0.4 % (ref 0–1.9)
BILIRUB SERPL-MCNC: 1.1 MG/DL (ref 0.1–1)
BUN SERPL-MCNC: 29 MG/DL (ref 8–23)
CALCIUM SERPL-MCNC: 8.1 MG/DL (ref 8.7–10.5)
CHLORIDE SERPL-SCNC: 102 MMOL/L (ref 95–110)
CO2 SERPL-SCNC: 31 MMOL/L (ref 23–29)
CREAT SERPL-MCNC: 1.2 MG/DL (ref 0.5–1.4)
DIFFERENTIAL METHOD BLD: ABNORMAL
EOSINOPHIL # BLD AUTO: 0.2 K/UL (ref 0–0.5)
EOSINOPHIL NFR BLD: 4.9 % (ref 0–8)
ERYTHROCYTE [DISTWIDTH] IN BLOOD BY AUTOMATED COUNT: 12 % (ref 11.5–14.5)
EST. GFR  (NO RACE VARIABLE): >60 ML/MIN/1.73 M^2
FERRITIN SERPL-MCNC: 244 NG/ML (ref 20–300)
GLUCOSE SERPL-MCNC: 121 MG/DL (ref 70–110)
HCT VFR BLD AUTO: 38.4 % (ref 40–54)
HGB BLD-MCNC: 12.8 G/DL (ref 14–18)
IMM GRANULOCYTES # BLD AUTO: 0.03 K/UL (ref 0–0.04)
IMM GRANULOCYTES NFR BLD AUTO: 0.6 % (ref 0–0.5)
IRON SERPL-MCNC: 94 UG/DL (ref 45–160)
LYMPHOCYTES # BLD AUTO: 0.8 K/UL (ref 1–4.8)
LYMPHOCYTES NFR BLD: 15.5 % (ref 18–48)
MCH RBC QN AUTO: 32 PG (ref 27–31)
MCHC RBC AUTO-ENTMCNC: 33.3 G/DL (ref 32–36)
MCV RBC AUTO: 96 FL (ref 82–98)
MONOCYTES # BLD AUTO: 0.3 K/UL (ref 0.3–1)
MONOCYTES NFR BLD: 6.4 % (ref 4–15)
NEUTROPHILS # BLD AUTO: 3.5 K/UL (ref 1.8–7.7)
NEUTROPHILS NFR BLD: 72.2 % (ref 38–73)
NRBC BLD-RTO: 0 /100 WBC
PLATELET # BLD AUTO: 113 K/UL (ref 150–450)
PMV BLD AUTO: 12.3 FL (ref 9.2–12.9)
POTASSIUM SERPL-SCNC: 4.4 MMOL/L (ref 3.5–5.1)
PROT SERPL-MCNC: 7 G/DL (ref 6–8.4)
RBC # BLD AUTO: 4 M/UL (ref 4.6–6.2)
SATURATED IRON: 32 % (ref 20–50)
SODIUM SERPL-SCNC: 139 MMOL/L (ref 136–145)
TOTAL IRON BINDING CAPACITY: 291 UG/DL (ref 250–450)
TRANSFERRIN SERPL-MCNC: 208 MG/DL (ref 200–375)
WBC # BLD AUTO: 4.85 K/UL (ref 3.9–12.7)

## 2024-05-17 PROCEDURE — 85025 COMPLETE CBC W/AUTO DIFF WBC: CPT | Performed by: INTERNAL MEDICINE

## 2024-05-17 PROCEDURE — 36415 COLL VENOUS BLD VENIPUNCTURE: CPT | Performed by: INTERNAL MEDICINE

## 2024-05-17 PROCEDURE — 82728 ASSAY OF FERRITIN: CPT | Performed by: INTERNAL MEDICINE

## 2024-05-17 PROCEDURE — 83540 ASSAY OF IRON: CPT | Performed by: INTERNAL MEDICINE

## 2024-05-17 PROCEDURE — 80053 COMPREHEN METABOLIC PANEL: CPT | Performed by: INTERNAL MEDICINE

## 2024-05-17 NOTE — PROGRESS NOTES
[facility] Hematology/Oncology  PROGRESS NOTE -  Follow-up Visit      Subjective:       Patient ID:   NAME: Cyrus Batres Jr. : 1950     73 y.o. 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  The patient is here today to go over the results of the recently ordered labs, tests and studies. He is here by himself.      He previously had had completed chemotherapy and  XRT.      He saw Dr James with ENT 2024 with DL with good report per patient. He sees him again on 2024 for ear flush.       He saw Dr Patterson on 5/10/2024; Dena iSlveira NP with endocrine in 2024    He had PEt on 2024     He is doing well and is active at home.      He is breathing ok. 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 He had his portacath removed on  and replaced on 2023 by Dr Le            Discussed covid19 and he has been vaccinated      ROS:   GEN: normal without any fever, night sweats or weight loss; doing well with tube feeds   HEENT: normal with no HA's, sore throat, stiff neck, changes in vision  CV: normal with no CP, SOB, PND, no currentDOE  PULM: normal with no SOB, cough, hemoptysis, sputum or pleuritic pain  GI: no current N/V  ; has peg tube;    : normal with no hematuria, dysuria  BREAST: normal with no  mass, discharge, pain  SKIN: normal with no rash, erythema, bruising, or swelling     Pain Scale:  0    Allergies:  Review of patient's allergies indicates:   Allergen Reactions    Lovastatin Itching    Pollen extracts     Lovastatin Rash     Not confirmed but pt skeptical       Medications:    Current Outpatient Medications:     acetaminophen (TYLENOL) 325 MG tablet, Take 325 mg by mouth every 6 (six) hours as needed for Pain., Disp: , Rfl:     calcium carbonate 500 mg/5 mL (1,250 mg/5 mL), Take 20 mls four times daily with meals and nightly. (Patient taking differently: 40 mLs by Per G Tube route 3 (three) times daily. Take 20 mls four times daily with meals and nightly.), Disp: 2300 mL, Rfl: 12    diclofenac sodium (VOLTAREN ARTHRITIS PAIN) 1 % Gel, Apply 2 g topically once daily. Apply couple of times a day on the affected arthritis pain., Disp: 100 g, Rfl: 1    esomeprazole (NEXIUM) 40 MG capsule, 40 mg before breakfast., Disp: , Rfl:     ibuprofen (ADVIL,MOTRIN) 200 MG tablet, Take 200 mg by mouth every 6 (six) hours as needed for Pain., Disp: , Rfl:     lactose-reduced food with fibr (JEVITY 1.5 TRISHA) 0.06 gram-1.5 kcal/mL Liqd, 6 cartons per day via peg.  Flush 60ml before and after each bolus (Patient taking differently: 240 mLs by Per G Tube route 5 (five) times daily. 6 cartons per day via peg.  Flush 60ml before and after each bolus), Disp: 180 each, Rfl: 11    levothyroxine (SYNTHROID) 100 MCG tablet, 1 tablet (100 mcg total) by Per G Tube route before breakfast., Disp: 30 tablet, Rfl: 11    famotidine (PEPCID) 40 mg/5 mL (8 mg/mL) suspension, Give 2.5 mLs (20 mg total) by Per G Tube route 2 (two) times daily. (Patient not taking: Reported on 2/6/2024), Disp: 50 mL, Rfl: 11    PMHx/PSHx Updates:  See patient's last visit with me on 2/20/2024  See H&P on 9/23/2022        Pathology:   Cancer Staging   Larynx cancer  Staging form: Larynx - Glottis, AJCC 8th Edition  - Clinical stage from 8/6/2020: Stage II  "(cT2, cN0, cM0) - Signed by Fariha Muñoz NP on 8/6/2020  - Pathologic stage from 1/20/2022: Stage LOU (pT4a, pN0, cM0) - Signed by Fariha Muñoz NP on 1/20/2022  Staging form: Pharynx - P16 Negative Oropharynx, AJCC 8th Edition  - Clinical: Stage II (rcT2, cN0, cM0) - Signed by Matheus Portillo Jr., MD on 5/30/2022    Malignant neoplasm of base of tongue  Staging form: Pharynx - P16 Negative Oropharynx, AJCC 8th Edition  - Clinical stage from 5/20/2022: Stage II (cT2, cN0, cM0, p16-) - Signed by Saúl Kessler MD on 7/7/2022    Dissection 11/9/2022:    Final Pathologic Diagnosis 1. "left submandibular lymph node", dissection:       - Three lymph nodes, negative for carcinoma (0/3)   2. 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       - Tumor size: 4.5 cm       - Resection margins: left superior pharyngeal margin focally involved;   all other margins are negative for carcinoma       - Left internal jugular vein: free of tumor       - One lymph node, negative for carcinoma (0/1)   Note: P16 immunostain is negative     Tumor Site       Oropharynx  involving Base of tongue       Tumor Laterality       Midline       Tumor Size       Greatest Dimension (Centimeters) 4.5 cm         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)       Histologic Grade       G2 to G3: Moderate to Poorly differentiated       Lymphovascular Invasion       Not identified       Perineural Invasion       Not identified     MARGINS      Margins       Involved by invasive tumor     Margin(s)   Involved by Invasive Tumor:      left superior pharyngeal margin; all other   margins are free of tumor     LYMPH NODES    Regional Lymph Node Status:    :     Regional Lymph Node   Status:      All regional lymph nodes negative for tumor      pT Category:               pT3   pN Category:               pN0      Base of Tongue " Biopsy  4/27/2022:  Final Pathologic Diagnosis BIOPSY OF BASE OF THE TONGUE:   THE INFILTRATING POORLY DIFFERENTIATED SQUAMOUS CELL CARCINOMA   THE TUMOR IS P16 NEGATIVE.  THE POSITIVE AND NEGATIVE CONTROLS STAINED   APPROPRIATELY      Larynx resection/thyroidectomy 1/6/2022:     Final Pathologic Diagnosis 1. Lymph nodes, left neck levels 2,  3 and 4, dissection:   - Nineteen lymph nodes, all  negative  for metastatic carcinoma (0/19)   2. Lymph nodes, right neck levels 2,  3 and 4, dissection:   - Twenty-eight lymph nodes, all  negative  for metastatic carcinoma (0/28)   3. Possible parathyroid gland, excision:   - Benign parathyroid gland tissue (0.003 g)   4. 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 Soft tissue, posterior tracheal margin, excision:   - Benign, focally reactive respiratory mucosa with submucosal acute   inflammation,  negative  for dysplasia or malignancy   6. Soft tissue, anterior tracheal margin, excision:   - Benign respiratory mucosa with subepithelial cartilage,  negative  for   dysplasia or malignancy   7. Soft tissue, posterior tracheal margin #2, excision:   - Benign, focally reactive respiratory mucosa with submucosal acute   inflammation,  negative  for dysplasia or malignancy   8. Soft tissue, anterior tracheal margin #2, excision:   - Benign, reactive focally ulcerated respiratory mucosa with submucosal acute   inflammation,  negative  for dysplasia or malignancy             Laryngoscope 8/4/2020: (with   "Bert):  Final Pathologic Diagnosis 1.  Left true vocal fold, biopsy:       -  Invasive moderately differentiated squamous cell carcinoma,   keratinizing type   2.  Left true vocal fold, biopsy:       -  Invasive moderately differentiated squamous cell carcinoma,   keratinizing type             Laryngoscope 3/17/2020 (with Dr Xiao):  Final Pathologic Diagnosis 1.  BIOPSY OF LEFT TRUE VOCAL CORD:   SEVERELY DYSPLASTIC APPEARING SQUAMOUS MUCOSA   INVASIVE CARCINOMA IS NOT DOCUMENTED   ON THE OTHER HAND, THESE FRAGMENTS ARE NODUL AND WITHOUT SUBMUCOSA FOR   EVALUATION; IT IS POSSIBLE THAT THEY REFLECT INVASIVE SQUAMOUS CARCINOMA   2.  BIOPSY OF LEFT ANTERIOR COMMISSURE:   MODERATE DYSPLASIA   NO INVASIVE CARCINOMA IDENTIFIED             Objective:     Vitals:  Blood pressure 128/65, pulse 71, temperature 97.9 °F (36.6 °C), resp. rate 16, height 5' 6" (1.676 m), weight 68.5 kg (151 lb).    Physical Examination:   GEN: no apparent distress, comfortable; AAOx3  HEAD: atraumatic and normocephalic  EYES: no pallor, no icterus, PERRLA  ENT: OMM, no pharyngeal erythema, external ears WNL; no nasal discharge; no thrush; s/p laryngectomy with flap since healed well; trach   NECK: no masses, thyroid normal, trachea midline, no LAD/LN's, supple; post-op dissection healed well;    CV: RRR with no murmur; normal pulse; normal S1 and S2; no pedal edema; portacath   CHEST: Normal respiratory effort; CTAB; normal breath sounds; no wheeze or crackles  ABDOM: nontender and nondistended; soft; normal bowel sounds; no rebound/guarding; peg tube  MUSC/Skeletal: ROM normal; no crepitus; joints normal; no deformities or arthropathy  EXTREM: no clubbing, cyanosis, inflammation or swelling  SKIN: no rashes, lesions, ulcers, petechiae or subcutaneous nodules  : no mahan  NEURO: grossly intact; motor/sensory WNL; AAOx3; no tremors  PSYCH: normal mood, affect and behavior  LYMPH: normal cervical, supraclavicular, axillary and groin " LN's          Labs:     Lab Results   Component Value Date    WBC 4.85 05/17/2024    HGB 12.8 (L) 05/17/2024    HCT 38.4 (L) 05/17/2024    MCV 96 05/17/2024     (L) 05/17/2024     CMP  Sodium   Date Value Ref Range Status   05/17/2024 139 136 - 145 mmol/L Final     Potassium   Date Value Ref Range Status   05/17/2024 4.4 3.5 - 5.1 mmol/L Final     Chloride   Date Value Ref Range Status   05/17/2024 102 95 - 110 mmol/L Final     CO2   Date Value Ref Range Status   05/17/2024 31 (H) 23 - 29 mmol/L Final     Glucose   Date Value Ref Range Status   05/17/2024 121 (H) 70 - 110 mg/dL Final     BUN   Date Value Ref Range Status   05/17/2024 29 (H) 8 - 23 mg/dL Final     Creatinine   Date Value Ref Range Status   05/17/2024 1.2 0.5 - 1.4 mg/dL Final     Calcium   Date Value Ref Range Status   05/17/2024 8.1 (L) 8.7 - 10.5 mg/dL Final     Total Protein   Date Value Ref Range Status   05/17/2024 7.0 6.0 - 8.4 g/dL Final     Albumin   Date Value Ref Range Status   05/17/2024 4.4 3.5 - 5.2 g/dL Final   11/18/2020 3.7 3.6 - 5.1 g/dL Final     Comment:     For additional information, please refer to   http://education.[employer]Source.Thing5/faq/JTJ013 (This link is   being provided for informational/ educational purposes only.)  This test was developed and its analytical performance   characteristics have been determined by [facility]. It has not been cleared or approved by the   US Food and Drug Administration. This assay has been validated   pursuant to the CLIA regulations and is used for clinical   purposes.  @ Test Performed By:  [facility] [place]  Yo Cortes M.D.,   [address] [place] [address]  CLIA  [id number]       Total Bilirubin   Date Value Ref Range Status   05/17/2024 1.1 (H) 0.1 - 1.0 mg/dL Final     Comment:     For infants and newborns, interpretation of results should be based  on gestational age, weight and in  agreement with clinical  observations.    Premature Infant recommended reference ranges:  Up to 24 hours.............<8.0 mg/dL  Up to 48 hours............<12.0 mg/dL  3-5 days..................<15.0 mg/dL  6-29 days.................<15.0 mg/dL       Alkaline Phosphatase   Date Value Ref Range Status   05/17/2024 124 55 - 135 U/L Final     AST   Date Value Ref Range Status   05/17/2024 32 10 - 40 U/L Final     ALT   Date Value Ref Range Status   05/17/2024 35 10 - 44 U/L Final     Anion Gap   Date Value Ref Range Status   05/17/2024 6 (L) 8 - 16 mmol/L Final     eGFR if    Date Value Ref Range Status   07/29/2022 >60.0 >60 mL/min/1.73 m^2 Final     eGFR if non    Date Value Ref Range Status   07/29/2022 >60.0 >60 mL/min/1.73 m^2 Final     Comment:     Calculation used to obtain the estimated glomerular filtration  rate (eGFR) is the CKD-EPI equation.                   Radiology/Diagnostic Studies:      PET 2/7/2024:  IMPRESSION:     Extensive postoperative changes of the neck as outlined above, stable compared to prior PET/CT.  No convincing evidence of residual/recurrent FDG avid malignancy.     Mild FDG avidity of the proximal thoracic esophagus near the operative site, decreased compared to prior.     FDG avidity of arthritic right sternoclavicular joint          Ct Chest/Neck 9/21/2023:    IMPRESSION:     1. Extensive postsurgical changes throughout the neck as described, with no evidence of residual or recurrent malignancy.  2. Negative for metastatic disease throughout the neck or the chest.  3. 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  4. Coronary artery calcifications.        PET 5/30/2023:    IMPRESSION: Postsurgical changes from total glossectomy, laryngectomy and pharyngectomy with bilateral neck dissections     There is resolution of the previously noted fluid collections within the neck. There is mild FDG activity in the left side the neck adjacent to  the neoesophagus as well as at the tracheostomy site to the right of the neoesophagus. Findings most likely are secondary to postsurgical and postinfection changes. There is no focal mass or adenopathy     No evidence of distant metastasis             CTA Neck    Result Date: 9/20/2022  EXAMINATION: CTA NECK CLINICAL HISTORY: Head/neck cancer, monitor;Malignant neoplasm of base of tongue TECHNIQUE: CT angiogram was performed from the level of the scott to the EAC following the IV administration of 75mL of Omnipaque 350.   Sagittal and coronal reconstructions and maximum intensity projection reconstructions were performed. Arterial stenosis percentages are based on NASCET measurement criteria. COMPARISON: CTA neck dated 05/20/2022 FINDINGS: Aortic arch and great vessels: There is a conventional left-sided 3 vessel arch.  The aortic arch is widely patent.  There is stable soft and calcified plaque in the proximal left subclavian artery with a similar appearance the prior study and possibly within radiation field but without critical stenosis or occlusion.  The right subclavian artery is patent.  The brachiocephalic trunk and origin of the left common carotid artery are patent. Carotid arteries Right carotid artery: There is calcified plaque scattered in the right common carotid artery without critical stenosis, occlusion or change.  There is no measurable stenosis of the internal carotid artery which is patent to the skull base. Left carotid artery: There is mild plaque scattered in the left common carotid artery without change and without critical stenosis or occlusion.  There is no measurable stenosis of the internal carotid artery. Vertebral arteries: The left vertebral artery is dominant and patent throughout its course in the neck.  The right vertebral artery is hypoplastic but patent.  There is no critical stenosis, occlusion, thrombus or dissection.  There is no change. The study extends intracranially.  There  is calcified plaque in the V4 segment of the left vertebral artery resulting in a moderate to marked short segment nonocclusive stenosis.  The right vertebral artery partially terminates in a posteroinferior cerebellar artery with a short segment moderate to marked stenosis of the very distal right vertebral artery.  Some flow within the distal right vertebral artery may represent retrograde flow from the dominant left vertebral artery.  The basilar artery is patent.  The origins of the superior cerebellar and posterior cerebral artery on the right are patent.  There is fetal origin of the left posterior cerebral artery which is patent. There is plaque in the cavernous segments of both internal carotid arteries but there is no critical stenosis or large vessel occlusion of the anterior circulation vessels.  There is no large aneurysm. Other: There is a similar appearance of the included upper lungs with pleural and parenchymal changes anteriorly in the upper lobes bilaterally.  As previously discussed, timing of the contrast bolus is performed during the arterial phase for CTA neck.  Therefore, enhancement of the soft tissues is somewhat suboptimal due to this technique. There has been a large region of soft tissue resection in the anterior mid and lower neck soft tissues with continued open defect in the upper chest and lower neck above the manubrium.  Soft tissue seen along the left posterolateral border of the trachea could represent secretions or mucus.  This was present previously. Redemonstrated is a large heterogeneously enhancing lesion centered at the level of the tongue base and floor of the mouth centrally into the left.  There is scattered calcifications.  The prior CTA demonstrated regions of central necrosis which are no longer definitely present but diffusely heterogeneous density and enhancement likely represents regions of necrosis.  This large heterogeneously enhancing soft tissue mass extends more  anteriorly in the floor of the mouth on the left and measures at least 5.6 cm in greatest anterior to posterior dimension with 3.6 cm transverse dimension.  This does extend anteriorly to the medial margin of the body of the mandible on the left. There are post treatment changes with stranding in the neck fat.     1. Similar appearance of the neck vessels when compared to the prior CTA neck with mild narrowing at the origin of the left subclavian artery.  There is no critical stenosis, occlusion, thrombosis or dissection involving the cervical vertebral or carotid arteries. 2. Larger mass within the hypopharynx and tongue base extending anteriorly to the floor of the mouth on the left to the medial margin of the body of the mandible without obvious bony destruction. 3. There is nonocclusive stenosis of the distal V4 segment of the left vertebral artery without change. 4. There is no large vessel occlusion or critical stenosis of the anterior circulation. 5. There is developmental variation with fetal origin of the left posterior cerebral artery. Electronically signed by: Rex Joyner MD Date:    09/20/2022 Time:    11:31    CT Abdomen Pelvis With Contrast    Result Date: 9/1/2022  CMS MANDATED QUALITY DATA - CT RADIATION - 436 All CT scans at this facility utilize dose modulation, iterative reconstruction, and/or weight based dosing when appropriate to reduce radiation dose to as low as reasonably achievable. Reason: abdominal pain partial history of laryngeal carcinoma TECHNIQUE: CT abdomen and pelvis with 100 mL Omnipaque 350. COMPARISON: PET/CT 5/13/2022 CT ABDOMEN: Coronary artery calcification and extremely tiny hiatal hernia incidentally noted. Visualized lung bases are clear. Liver, gallbladder, pancreas, spleen, adrenals, and kidneys are normal. Mild aortoiliac calcifications present. Gastrostomy tube tip lies in distal gastric body. Small intestines are unremarkable. Mild wall thickening affects the  ascending colon with pneumatosis intestinalis diffusely affecting the ascending colon. No other free intraperitoneal gas or, and remainder of colon is normal. A normal appendix is present. The major mesenteric vascular structures are patent. No acute osseous abnormality. CT PELVIS: Prostate is slightly enlarged. Bladder is normal. No free pelvic fluid. Tiny right and small to moderate left fat-containing inguinal hernias are present. No acute osseous abnormality. IMPRESSION: 1. Pneumatosis intestinalis affecting the ascending colon with associated mild wall thickening. Etiology of this is undetermined. Potential considerations include intestinal ischemia or pneumatosis related to chemotherapy. Clinical and laboratory correlation is needed to assess significance. No portal venous gas or free intraperitoneal air however. 2. Coronary artery calcifications Electronically signed by:  Nael Hui MD  9/1/2022 6:20 PM CDT Workstation: [id number]    NM PET CT Routine Skull to Mid Thigh    Result Date: 9/27/2022  PET CT WITH IMAGE FUSION HISTORY:  restage tongue cancer RESTAGING...  HX: TONGUE CA.  DX: April 2022.  LAST CHEMO TREATMENT WAS 3WKS AGO.  EVALUATE TX RESPONSE.   ALSO HX OF LARYNGEAL CA IN AUGUST 2020.  MULTIPLE SURGERIES: LARYNGECTOMY, THYROIDECTOMY & TRACHEOSTOMY.  RAD TX WAS COMPLETED IN NOV 2020. TECHNIQUE: Following IV administration of 11.2 mCi of F-18 labeled FDG into right antecubital fossa and a 60 minute delay, PET CT was performed from the vertex of the skull through the proximal thighs with an integrated PET CT scanner with image fusion. CT images were obtained to aid in attenuation correction and PET localization. The patient's serum glucose at the time of the exam was 136 mg/dL. COMPARISON: PET/CT 5/13/2022 FINDINGS: Poorly characterized soft tissue mass involving base of tongue approximately measures 4.3 x 2.8 cm (series 3 image 65) appearing similar to the prior exam. This reaches current max SUV  of 11.8, not significantly changed from prior of 11.4. No other abnormal FDG activity. Intracranial compartment is unremarkable. Trace bilateral maxillary sinus mucosal thickening is present. Postoperative changes of laryngectomy and tracheostomy are present. Surgical clips occur throughout the neck. No definite enlarged cervical or supraclavicular lymph node, with evaluation limited by lack of IV contrast. Left subclavian port catheter tip terminates in SVC. Diffuse coronary artery calcifications are present. No enlarged mediastinal or axillary lymph nodes. No pulmonary nodule or mass. Gastrostomy tube tip lies in gastric body. Moderate aortoiliac calcifications are present. Small fat-containing left inguinal hernia incidentally noted. Mild degenerative changes affect the spine. No suspicious osseous abnormality. IMPRESSION: 1. No significant change in the FDG avid mass involving the tongue base, remaining characteristic of malignancy. 2. No new FDG avid malignancy or metastatic disease. Electronically signed by:  Nael Hui MD  9/27/2022 2:38 PM CDT Workstation: [id number]    CT Abdomen Pelvis  Without Contrast    Result Date: 9/4/2022  CMS MANDATED QUALITY DATA - CT RADIATION  436 All CT scans at this facility utilize dose modulation, iterative reconstruction, and/or weight based dosing when appropriate to reduce radiation dose to as low as reasonably achievable. CT ABDOMEN PELVIS WITHOUT IV CONTRAST CLINICAL HISTORY: 72 years Male Follow-up pneumatosis COMPARISON: CT abdomen and pelvis September 1, 2022 FINDINGS: Imaging through the lower thorax demonstrates subsegmental atelectasis of the dependent lower lobes. Coronary artery calcification. Bone window images show no acute or aggressive osseous abnormality. Transitional lumbosacral vertebral body. On this unenhanced exam, no focal hepatic lesion. Gallbladder and biliary tree are unremarkable. Spleen appears normal. Pancreas is unremarkable. No adrenal  lesion. No renal calculi or hydronephrosis. Ureters are normal in caliber. Urinary bladder is within normal limits. Gastrostomy tube is in place within the stomach. Stomach is largely collapsed. No evidence of small bowel obstruction. Pneumatosis and pericolonic abscess involving the ascending colon is redemonstrated, slightly diminished compared to prior. Mild wall thickening throughout the colon. No free fluid or free air within the abdomen or pelvis. Aortoiliac atherosclerotic calcification. No pathologically enlarged lymph nodes within the abdomen or pelvis. Bilateral fat-containing inguinal hernias, larger on the left. IMPRESSION: Pneumatosis and pericolonic gas about the ascending colon has decreased in volume compared to prior. Persistent colonic wall thickening which could indicate colitis. Coronary and aortic atherosclerosis. Gastrostomy tube is within the stomach. Bilateral inguinal hernias. Electronically signed by:  Jose De Jesus Oleary MD  9/4/2022 4:06 PM CDT Workstation: [id number]    CTA neck  5/20/2022:     IMPRESSION:  Persistent mass within the hypopharynx consistent with malignancy.  By my measurements it is larger than on April 24, 2022 with central necrosis.  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 This is similar to the previous April 2022 study.  No evidence of arterial compromise related to malignancy.     PET 5/13/2022:     IMPRESSION:  1. Postoperative changes of prior laryngectomy and lymph node resection/radiation.  2. Masslike FDG avid lesion to the left of midline at the tongue base concerning for residual/recurrent disease.  3. Adjacent confluent nodular extension along the inferior left side of the mass.  4. No FDG avid lymphadenopathy or convincing additional sites of disease in the chest, abdomen or pelvis.     CT head 5/10/2022:  FINDINGS: Comparison to multiple prior exams. There is no acute intracranial hemorrhage, with  no mass effect or abnormal extra-axial fluid. Mild scattered areas of nonspecific hypoattenuation involve the deep periventricular white matter, with gray-white differentiation maintained.     There is mild generalized prominence of the cortical sulci and ventricles. The cerebellum and brainstem are unremarkable. There are carotid siphon vascular calcifications. The visualized paranasal sinuses and mastoid air cells are clear. There is no acute calvarial fracture or scalp hematoma.     IMPRESSION: No acute intracranial hemorrhage or acute calvarial fracture     CT soft neck 4/24/2022;     Oral tongue/tongue base:  At the base of the tongue on the left there are findings of a heterogeneously enhancing mass measuring 2.1 cm highly concerning for a neoplastic process.     True and false cords:  Patient status post laryngectomy which has been performed in the interim. Soft tissue stranding in the lower neck likely related to a previous flat reconstruction. No enhancement or lymphadenopathy within the lower neck.     Lymph node assessment:Negative     Surrounding soft tissues:  Negative     Vasculature:  Negative     Osseous structures:  Negative     Lung apices:  Scarring involving the lung apices bilaterally likely related to previous radiation.     IMPRESSION:  1. Postoperative and radiation changes involving the lower neck.  2. Findings highly concerning for a developing mass at the left base of the tongue enhancing 2.1 cm region. Direct visualization in this region would be of benefit.        CT soft neck  12/24/2021  IMPRESSION:  1.  Laryngeal mass as on prior exam. Laryngeal airway narrowing has not changed.  2.  No evidence for active hemorrhage.  3.  Partially visualized patchy groundglass infiltrates in both upper lobes. Also see CT chest report     CTA Chest 12/24/2021:  IMPRESSION:     1.  No pulmonary embolism.     2.  Soft tissue stranding in the region of the strap musculature with ill-defined hypodensity  measuring 2.8 x 1.4 cm in the cervical midline.  Findings concerning for infectious process or abscess. Neoplastic process could have similar imaging characteristics.     3.  Suggested erosion of the proximal right parasymphyseal aspect of the thyroid shield.  Correlated with CT soft tissue neck findings. Findings could represent osteomyelitis. Neoplastic process cannot be excluded.     4.  Hepatic steatosis.  5.  Thickening of the gastric wall. Prominence of perigastric vasculature. Small hiatal hernia.     6.  Moderate stool burden.  Correlate for constipation.        PET 12/15/2021:  IMPRESSION:     Substantial qualitative and quantitative increase of hypermetabolic FDG activity associated with the larynx in this patient with known supraglottic neoplasm.     No evidence of FDG avid metastatic disease involving neck, chest, abdomen or pelvis.     PET 8/21/2020:     Impression:     1. Intensely hypermetabolic plaque-like mass along the left true vocal cord, compatible with known laryngeal carcinoma.  2. No findings of regional metastatic disease in the neck, or distant metastatic disease.        CT Chest 8/10/2020:     IMPRESSION:     No metastatic disease within the chest.  Three-vessel coronary artery calcification. Nondilated cardiac  chambers     CT Soft neck 7/22/2020:  IMPRESSION:  1. Slight interval increase in size of the previously described  enhancing nodule along the anterior left vocal cord.  2. No pathologic lymphadenopathy.  3. Additional and incidental findings as noted above.     CT Soft neck  3/16/2020:     Impression:     6 mm enhancing focus involving the superior aspect of the left focal cord near the anterior commisure.  Recommend direct visualization for further evaluation of laryngeal polyp     Question of 2 mm submucosal lipoma involving the midportion of the superior aspect of the left vocal cord.     Mild arteriosclerosis involving the brachiocephalic arteries and carotid arteries     Mild  degenerative change of the cervical spine        I have reviewed all available lab results and radiology reports.    Assessment/Plan:   (1) 73 y.o. 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     - Patient has been under the care of Dr Khoobehi with [facility] and Dr Portillo with rad/onc.   - He was recently found to have a new primary cancer involving the base of his tongue in April 2022. He was deemed not to be a candidate for any further radiation and did not want to undergo any further surgery as this would necessitate a glossectomy procedure.   - He has been on adjuvant chemotherapy per direction of Dr Khoobehi and has had 3 cycles. His therapy course has been complicated with persistent diarrhea and N/V.      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 He saw Dr Lucero with Rad/onc this am. They are going [place] [facility] [place] James about surgical options. If he is not a surgical candidate then will proceed with cisplatin and XRT combine therapy.     10/6/2022:  - He saw Dr Lucero with Rad/onc, and Dr James and his case was [place] [facility] [facility] [place]  he general consensus was to proceed to surgery.  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   - He had his portacath removed on 11/28.   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, keratinizing type, moderate to poorly differentiated tumor  - Four LN's were all negative  - he did have a positive left superior pharyngeal margin  - pT3 pN0  - reviewed the latest NCCN guidelines again from Version 1.2023; he may need some further systemic therapy due to the margin  - check on Rad/onc follow-up     1/23/2023:  - s/p new portacath placed on 1/12/2023 with Dr Le  - starting combined chemotherapy and XRT - cisplatin  - s/p chemotherapy school with [facility]    2/6/2023:  - he seems to be tolerating the chemotherapy well at this time  - continued with concomitant therapy with XRT    2/22/2023:  - he seems to be tolerating the chemotherapy well at this time  - continued with concomitant therapy with XRT  - labs relatively adequate  - will check on his refills    3/6/2023:  - finishing up XRT this comking Thursday  - last chemotherapy today    4/3/2023:  - He has since completed chemotherapy and  XRT.  He seems to have tolerated the regimen fairly well with no new complaints.  Some weight loss but reports he is staying hydrated. He does have some occasional GRIFFIN.   - He is seeing ENT tomorrow with Dr James    5/31/2023:  - He has since completed chemotherapy and  XRT.  He seems to have tolerated the regimen fairly well . He saw Dr James with ENT on 5/2/2023 and had repeat scope with good report per patient.  - He had recent PET yesterday on 5/30 which overall looks adequate    8/23/2023:  - sees Dr James again on 9/5/2023  - will set up repeat CT neck and chest for Sept 2023  - reach out to dietary about foods he  can eat that are higher in iron  - consider IV iron x2 (he can not swallow pills)  - see if we can renew PT    11/15/2023:  - He previously had completed chemotherapy and  XRT.  He seems to have tolerated the regimen fairly well . He saw Dr James with ENT on 11/72023 and had repeat scope with good report per patient.  - He last saw Dr Patterson on 8/3/2023 and sees him again in Jan 2024, Dr Portillo on 6/19/2023  - He had last PET on 5/30  and CT Chest/Neck in Sept 2023  - he missed one IV iron  - due for up to date labs    2/20/2024:  - He saw Dr James with ENT 2/6/2024 with DL with good report per patient. He sees him again in 3 months  - He saw Dr Patterson on 2/5/2024  - He had PEt on 2/7/2024   - some residual thrombocytopenia post-chemotherapy - monitor for now as this may take some time to recover     5/20/2024:  - He saw Dr James with ENT 5/7/2024 with DL with good report per patient. He sees him again on 6/18/2024 for ear flush.   - He saw Dr Patterson on 5/10/2024; Dena Silveira NP with endocrine in April 2024  - he had tube exchange with Dr Cooper in April 2024  - still having some hypocalcemia issues  - he declined referral to podiatrist for his right ankle issues     (2) Hx/of Laryngeal cancer in Aug 2020 stage II (cT2 N0)  - s/p radiation followed by bilateral neck dissection and total thyroidectomy     Brief Oncology Summary for his laryngeal CA hx:     Patient was noted to initially have a suspicious lesion on the left true vocal cord by laryngoscopy with Dr Xiao in March 2020 with biopsy showing severe dysplastic changes without definitive invasive process. He had a CT scan on 3/16/2020 which showed a 6mm nodule on the left vocal cord. A repeat Ct scan four months later on 7/22/2020 showed the nodule enlarged to 1.4 x 1.1 cm in size. Patient was then seen by Dr Noel and underwent repeat scope in Aug 2020 who found a bulky deeply invading tumor which was debulked and the pathology coming back moderately  differentiated SCCA without lymphovascular or perineural invasion. Hs case was subsequently presented to the tumor board and the consensus was to proceed with radiation. He completed XRT on 10/30/2020 and proceeded with regular laryngoscopy surveillance. He eventually required salvage laryngectomy and neck dissection with flap with Dr James on Jan 6th 2022. 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 Fifty lymph nodes were removed which were all negative. Pathology TNM was pT4a, pN0. Patient did not require any adjuvant therapy afterwards.      (2) HTN and hypercholesterolemia     (3) Paroxysmal atrial fibrillation, V tach     (4) DM II     (5) B12 deficiency      (6) Fatty liver disease, GERD           VISIT DIAGNOSES:      S/P laryngectomy    Larynx cancer    Malignant neoplasm of base of tongue    Iron deficiency anemia due to chronic blood loss    Chemotherapy-induced neutropenia    Laryngeal cancer    Iron deficiency anemia, unspecified iron deficiency anemia type    Cancer related pain    Vitamin B12 deficiency    Abnormal CT scan, neck          PLAN:  continue with ENT and rad/onc f/u as directed by them respectively - sees Dr James again in June 2024 - will order f/u CT neck and chest  Continue to monitor labs and resume IV iron as needed   s/p chemotherapy school with [facility] - discussed the side-effect profile, provided literature and obtained consents  F/u Rad/onc follow-up as directed  Check labs monthly for now  F/u with PCP, ENT, etc  Dietician f/u on the tube feeds      RTC in 12 weeks with myself  Fax note to  Bandar/Erika Lucero, Yesenia Gomez; Raoul Cooper       Discussion:     COVID-19 Discussion:     I had long discussion with patient and any applicable family about the COVID-19 coronavirus epidemic and the recommended precautions with regard to  "cancer and/or hematology patients. I have re-iterated the CDC recommendations for adequate hand washing, use of hand -like products, and coughing into elbow, etc. In addition, especially for our patients who are on chemotherapy and/or our otherwise immunocompromised patients, I have recommended avoidance of crowds, including movie theaters, restaurants, churches, etc. I have recommended avoidance of any sick or symptomatic family members and/or friends. I have also recommended avoidance of any raw and unwashed food products, and general avoidance of food items that have not been prepared by themselves. The patient has been asked to call us immediately with any symptom developments, issues, questions or other general concerns.         Pathology Discussion:     I reviewed and discussed the pathology report(s) and radiograph reports (if available) in as simple to understand and/or laymen's terms to the best of my ability. I had an indepth conversation with the patient and went over the patient's individual diagnosis based on the information that was currently available. I discussed the TNM staging process with regard to the patient's particular cancer type, and the calculated stage based on the currently available TNM data and literature. I discussed the available prognostic data with regard to the current staging information and how it relates to the prognosis of their particular neoplastic process.          NCCN Guidelines:     I discussed the available treatment option(s) in accordance with the latest literature from the NCCN Clinical Practice Guidelines for the patient's particular type of cancer disorder. The NCCN Guidelines provide a "document evidence-based (and) consensus-driven management" of the care of oncology patients. The treatment recommendations were made not only in accordance to the NCCN guidelines, but also factored in to account the patient's overall age, condition, performance status and " their medical co-morbidities. I went over the risks and benefits of the the treatment options (if any could be made) with regard to their particular cancer type, their cancer stage, their age, and their co-morbidities.         Chemotherapy Discussion:      I discussed the available treatment option(s) in accordance with the latest/current national evidence-based guidelines (NCCN, UpToDate, NCI, ASCO, etc where applicable), their overall age/condition and their co-morbidities. I also went over the risks and benefits of the chemotherapy with regard to their particular cancer type, their cancer stage, their age/condition, and their co-morbidities. I provided literature on the chemotherapy regimen and discussed the chemotherapy side-effect profiles of the drug(s). I discussed the importance of compliance with obtaining and monitoring weekly lab work, and went over the potential hematopathology issues and risks with anemia, leucopenia and thrombocytopenia that can occur with chemotherapy. I discussed the potential risks of liver and kidney damage, which could be permanent and could necessitate dialysis long-term if kidney failure developed. I discussed the emetic and/or diarrheal potential of the regimen and the potential need for use of antiemetic and anti-diarrheal medications. I discussed the risk for development of anaphylactic shock, bronchospasm, dysrhythmia, and respiratory/cardiovascular arrest and/or failure. I discussed the potential risks for development of alopecia, cold sensory issues, ringing in ears, vertigo, cataracts, glaucoma, and neuropathy, all of which could end up being chronic and life-long. Some chemotherpyI discussed the risks of hand-foot syndrome and rashes, and development of other autoimmune mediated processes such as pneumonitis, hepatitis, and colitis which could be life threatening. I discussed the risks of the potential development of a rare but fatal viral mediated disease known as PML  (Progressive Multifocal Leukoencephalopathy), and risk of future development of leukemia and/or lymphoma from use of certain chemotherapy agents. I discussed the need for neutropenic precautions, basic hygiene/sanitation behaviors and dietary restrictions.    The patient's consent has been obtained to proceed with the chemotherapy.The patient will be referred to Chemotherapy School [facility] for training and education on chemotherapy, use of antiemetics and/or anti-diarrheals, use of NSAID's, potential chemotherapy side-effects, and any specific recommendations and precautions with the particular chemotherapy agents.      I answered all of the patient's (and family's, if applicable) questions to the best of my ability and to their complete satisfaction. The patient acknowledged full understanding of the risks, recommendations and plan(s).     I have explained and the patient understands all of  the current recommendation(s). I have answered all of their questions to the best of my ability and to their complete satisfaction.         I spent over 25 mins of time with the patient. Reviewed results of the recently ordered labs, tests and studies; made directives with regards to the results. Over half of this time was spent couseling and coordinating care.    I have explained all of the above in detail and the patient understands all of the current recommendation(s). I have answered all of their questions to the best of my ability and to their complete satisfaction.   The patient is to continue with the current management plan.            Electronically signed by Venu Tamez MD

## 2024-05-20 ENCOUNTER — OFFICE VISIT (OUTPATIENT)
Facility: CLINIC | Age: 74
End: 2024-05-20
Payer: MEDICARE

## 2024-05-20 VITALS
HEART RATE: 71 BPM | DIASTOLIC BLOOD PRESSURE: 65 MMHG | TEMPERATURE: 98 F | HEIGHT: 66 IN | WEIGHT: 151 LBS | SYSTOLIC BLOOD PRESSURE: 128 MMHG | RESPIRATION RATE: 16 BRPM | BODY MASS INDEX: 24.27 KG/M2

## 2024-05-20 DIAGNOSIS — G89.3 CANCER RELATED PAIN: ICD-10-CM

## 2024-05-20 DIAGNOSIS — D50.9 IRON DEFICIENCY ANEMIA, UNSPECIFIED IRON DEFICIENCY ANEMIA TYPE: ICD-10-CM

## 2024-05-20 DIAGNOSIS — C01 MALIGNANT NEOPLASM OF BASE OF TONGUE: ICD-10-CM

## 2024-05-20 DIAGNOSIS — C32.9 LARYNX CANCER: ICD-10-CM

## 2024-05-20 DIAGNOSIS — Z90.02 S/P LARYNGECTOMY: Primary | ICD-10-CM

## 2024-05-20 DIAGNOSIS — E53.8 VITAMIN B12 DEFICIENCY: ICD-10-CM

## 2024-05-20 DIAGNOSIS — D70.1 CHEMOTHERAPY-INDUCED NEUTROPENIA: ICD-10-CM

## 2024-05-20 DIAGNOSIS — T45.1X5A CHEMOTHERAPY-INDUCED NEUTROPENIA: ICD-10-CM

## 2024-05-20 DIAGNOSIS — D50.0 IRON DEFICIENCY ANEMIA DUE TO CHRONIC BLOOD LOSS: ICD-10-CM

## 2024-05-20 DIAGNOSIS — C32.9 LARYNGEAL CANCER: ICD-10-CM

## 2024-05-20 DIAGNOSIS — R93.89 ABNORMAL CT SCAN, NECK: ICD-10-CM

## 2024-05-20 PROCEDURE — G2211 COMPLEX E/M VISIT ADD ON: HCPCS | Mod: S$GLB,,, | Performed by: INTERNAL MEDICINE

## 2024-05-20 PROCEDURE — 3288F FALL RISK ASSESSMENT DOCD: CPT | Mod: CPTII,S$GLB,, | Performed by: INTERNAL MEDICINE

## 2024-05-20 PROCEDURE — 3008F BODY MASS INDEX DOCD: CPT | Mod: CPTII,S$GLB,, | Performed by: INTERNAL MEDICINE

## 2024-05-20 PROCEDURE — 3044F HG A1C LEVEL LT 7.0%: CPT | Mod: CPTII,S$GLB,, | Performed by: INTERNAL MEDICINE

## 2024-05-20 PROCEDURE — 99999 PR PBB SHADOW E&M-EST. PATIENT-LVL III: CPT | Mod: PBBFAC,,, | Performed by: INTERNAL MEDICINE

## 2024-05-20 PROCEDURE — 1125F AMNT PAIN NOTED PAIN PRSNT: CPT | Mod: CPTII,S$GLB,, | Performed by: INTERNAL MEDICINE

## 2024-05-20 PROCEDURE — 3074F SYST BP LT 130 MM HG: CPT | Mod: CPTII,S$GLB,, | Performed by: INTERNAL MEDICINE

## 2024-05-20 PROCEDURE — 99215 OFFICE O/P EST HI 40 MIN: CPT | Mod: S$GLB,,, | Performed by: INTERNAL MEDICINE

## 2024-05-20 PROCEDURE — 3078F DIAST BP <80 MM HG: CPT | Mod: CPTII,S$GLB,, | Performed by: INTERNAL MEDICINE

## 2024-05-20 PROCEDURE — 1101F PT FALLS ASSESS-DOCD LE1/YR: CPT | Mod: CPTII,S$GLB,, | Performed by: INTERNAL MEDICINE

## 2024-05-20 PROCEDURE — 1160F RVW MEDS BY RX/DR IN RCRD: CPT | Mod: CPTII,S$GLB,, | Performed by: INTERNAL MEDICINE

## 2024-05-20 PROCEDURE — 3072F LOW RISK FOR RETINOPATHY: CPT | Mod: CPTII,S$GLB,, | Performed by: INTERNAL MEDICINE

## 2024-05-20 PROCEDURE — 1157F ADVNC CARE PLAN IN RCRD: CPT | Mod: CPTII,S$GLB,, | Performed by: INTERNAL MEDICINE

## 2024-05-20 PROCEDURE — 1159F MED LIST DOCD IN RCRD: CPT | Mod: CPTII,S$GLB,, | Performed by: INTERNAL MEDICINE

## 2024-05-25 ENCOUNTER — PATIENT MESSAGE (OUTPATIENT)
Dept: SURGICAL ONCOLOGY | Facility: CLINIC | Age: 74
End: 2024-05-25
Payer: MEDICARE

## 2024-06-03 ENCOUNTER — OFFICE VISIT (OUTPATIENT)
Dept: FAMILY MEDICINE | Facility: CLINIC | Age: 74
End: 2024-06-03
Payer: MEDICARE

## 2024-06-03 VITALS
DIASTOLIC BLOOD PRESSURE: 71 MMHG | HEART RATE: 68 BPM | SYSTOLIC BLOOD PRESSURE: 116 MMHG | BODY MASS INDEX: 24.4 KG/M2 | TEMPERATURE: 97 F | HEIGHT: 66 IN | WEIGHT: 151.81 LBS | OXYGEN SATURATION: 99 %

## 2024-06-03 DIAGNOSIS — R05.9 COUGH, UNSPECIFIED TYPE: Primary | ICD-10-CM

## 2024-06-03 DIAGNOSIS — J34.89 RHINORRHEA: ICD-10-CM

## 2024-06-03 PROCEDURE — 1125F AMNT PAIN NOTED PAIN PRSNT: CPT | Mod: CPTII,S$GLB,,

## 2024-06-03 PROCEDURE — 99999 PR PBB SHADOW E&M-EST. PATIENT-LVL IV: CPT | Mod: PBBFAC,,,

## 2024-06-03 PROCEDURE — 3288F FALL RISK ASSESSMENT DOCD: CPT | Mod: CPTII,S$GLB,,

## 2024-06-03 PROCEDURE — 1101F PT FALLS ASSESS-DOCD LE1/YR: CPT | Mod: CPTII,S$GLB,,

## 2024-06-03 PROCEDURE — 1157F ADVNC CARE PLAN IN RCRD: CPT | Mod: CPTII,S$GLB,,

## 2024-06-03 PROCEDURE — 3074F SYST BP LT 130 MM HG: CPT | Mod: CPTII,S$GLB,,

## 2024-06-03 PROCEDURE — 3078F DIAST BP <80 MM HG: CPT | Mod: CPTII,S$GLB,,

## 2024-06-03 PROCEDURE — 3044F HG A1C LEVEL LT 7.0%: CPT | Mod: CPTII,S$GLB,,

## 2024-06-03 PROCEDURE — 1160F RVW MEDS BY RX/DR IN RCRD: CPT | Mod: CPTII,S$GLB,,

## 2024-06-03 PROCEDURE — 1159F MED LIST DOCD IN RCRD: CPT | Mod: CPTII,S$GLB,,

## 2024-06-03 PROCEDURE — 3072F LOW RISK FOR RETINOPATHY: CPT | Mod: CPTII,S$GLB,,

## 2024-06-03 PROCEDURE — 99213 OFFICE O/P EST LOW 20 MIN: CPT | Mod: S$GLB,,,

## 2024-06-03 PROCEDURE — 3008F BODY MASS INDEX DOCD: CPT | Mod: CPTII,S$GLB,,

## 2024-06-03 RX ORDER — CODEINE PHOSPHATE AND GUAIFENESIN 10; 100 MG/5ML; MG/5ML
5 SOLUTION ORAL 3 TIMES DAILY PRN
Qty: 237 ML | Refills: 0 | Status: SHIPPED | OUTPATIENT
Start: 2024-06-03

## 2024-06-03 RX ORDER — FLUTICASONE PROPIONATE 50 MCG
1 SPRAY, SUSPENSION (ML) NASAL DAILY
Qty: 16 G | Refills: 2 | Status: SHIPPED | OUTPATIENT
Start: 2024-06-03 | End: 2024-06-03 | Stop reason: CLARIF

## 2024-06-03 RX ORDER — CODEINE PHOSPHATE AND GUAIFENESIN 10; 100 MG/5ML; MG/5ML
5 SOLUTION ORAL 3 TIMES DAILY PRN
COMMUNITY
End: 2024-06-03 | Stop reason: SDUPTHER

## 2024-06-03 NOTE — PROGRESS NOTES
SUBJECTIVE:      Patient ID: Cyrus Batres Jr. is a 73 y.o. male.    Chief Complaint: Oral Pain (2 weeks. Runny nose, and says feels like there is mucus in lungs)    Cyrus is a 73 yr male, who is a patient of Dr. Patterson. He is here today for a sick visit, with concerns about his lungs. PMH:  Trached and PEG, laryngeal cancer, laryngectomy, hypertension, hyperlipidemia, paroxysmal AFib, iron-deficiency anemia, and diabetes.     Reports that he came in today because he was concerned that he might have developed pneumonia.  He started having a cough and rhinorrhea 2 weeks ago.  Symptoms have improved.  Last week he was experiencing a productive cough with green phlegm, rhinorrhea continues to be clear.  Patient does have a trach.  States that now he has only had clear mucus production through his trach the past couple of days.  He has been using Claritin, increasing his water intake (16 oz 5 times a day with feedings extra), and using his guaifenesin-codeine cough syrup as needed.  States that he is almost out of his cough syrup.  States that he typically uses it about once or twice a week, but he has been using it 2 to 3 times day for the past 2 weeks.  Symptoms has been resolving. Denies SOB, Wheezing, fever, chills, CP, chest tightness, sinus pressure or ear pressure.     Cough  This is a recurrent problem. The current episode started in the past 7 days. The problem has been gradually improving. The problem occurs every few hours. The cough is Non-productive and productive of blood-tinged sputum. Pertinent negatives include no chest pain, chills, ear congestion, ear pain, eye redness, fever, headaches, heartburn, hemoptysis, myalgias, nasal congestion, postnasal drip, rash, rhinorrhea, sore throat, shortness of breath, sweats, weight loss or wheezing. Nothing aggravates the symptoms. He has tried OTC cough suppressant and rest for the symptoms. The treatment provided moderate relief. His past medical history is  significant for environmental allergies and pneumonia. There is no history of asthma, bronchiectasis, bronchitis, COPD or emphysema.         Review of patient's allergies indicates:   Allergen Reactions    Lovastatin Itching    Pollen extracts     Lovastatin Rash     Not confirmed but pt skeptical      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    Tongue cancer     Tracheostomy dependence     Type 2 diabetes mellitus, without long-term current use of insulin 11/22/2022    Unspecified glaucoma      Past Surgical History:   Procedure Laterality Date    CATARACT EXTRACTION W/  INTRAOCULAR LENS IMPLANT Right 8/16/2023    Procedure: CEIOL OD  NPO-peg;  Surgeon: Stoney Munoz MD;  Location: [facility] OR;  Service: Ophthalmology;  Laterality: Right;    CATARACT EXTRACTION W/  INTRAOCULAR LENS IMPLANT Left 10/18/2023    Procedure: CEIOL OS npo peg;  Surgeon: Stoney Munoz MD;  Location: [facility] OR;  Service: Ophthalmology;  Laterality: Left;    DIRECT LARYNGOBRONCHOSCOPY N/A 12/27/2021    Procedure: LARYNGOSCOPY, DIRECT, WITH BRONCHOSCOPY;  Surgeon: Flex Espinosa MD;  Location: [address];  Service: ENT;  Laterality: N/A;    DIRECT LARYNGOSCOPY  11/9/2022    Procedure: LARYNGOSCOPY, DIRECT;  Surgeon: Jesse James MD;  Location: [facility] OR [address] [place] [address];  Service: ENT;;    DISSECTION OF NECK Bilateral 1/6/2022    Procedure: DISSECTION, NECK;  Surgeon:  Jesse James MD;  Location: [facility] OR 2ND FLR;  Service: ENT;  Laterality: Bilateral;    DISSECTION OF NECK Bilateral 11/9/2022    Procedure: DISSECTION, NECK;  Surgeon: Jesse James MD;  Location: [facility] OR [facility] FLR;  Service: ENT;  Laterality: Bilateral;    ESOPHAGOGASTRODUODENOSCOPY N/A 4/9/2024    Procedure: EGD (ESOPHAGOGASTRODUODENOSCOPY);  Surgeon: Matheus Cooper III, MD;  Location: [facility] ENDO;  Service: Endoscopy;  Laterality: N/A;    FLAP PROCEDURE Right 1/6/2022    Procedure: CREATION, FREE FLAP;  Surgeon: Alise Hart MD;  Location: [facility] OR [facility] FLR;  Service: ENT;  Laterality: Right;  Ischemic start 1351  Ischemic stop 1502    FLAP PROCEDURE Left 11/9/2022    Procedure: CREATION, FREE FLAP, ALT;  Surgeon: Alise Hart MD;  Location: [facility] OR [facility] FLR;  Service: ENT;  Laterality: Left;    GLOSSECTOMY Bilateral 11/9/2022    Procedure: TOTAL GLOSSECTOMY;  Surgeon: Jesse James MD;  [facility];  Service: ENT;  Laterality: Bilateral;    INSERTION OF TUNNELED CENTRAL VENOUS CATHETER (CVC) WITH SUBCUTANEOUS PORT N/A 6/9/2022    Procedure: [id number]-I-CATH;  Surgeon: Jesus Viera MD;  Location: [facility] OR;  Service: General;  Laterality: N/A;    INSERTION OF TUNNELED CENTRAL VENOUS CATHETER (CVC) WITH SUBCUTANEOUS PORT Right 1/12/2023    Procedure: [id number]-[id number]-R-CATH;  Surgeon: Abdulaziz Le Jr., MD;  Location: [facility] OR;  Service: General;  Laterality: Right;    LARYNGECTOMY N/A 1/6/2022    Procedure: LARYNGECTOMY;  Surgeon: Jesse James MD;  [facility];  Service: ENT;  Laterality: N/A;    LARYNGOSCOPY N/A 8/4/2020    Procedure: Suspension microlaryngoscopy with biopsy, possible KTP laser treatment/excision;  Surgeon: Stew Noel MD;  Location: [facility] OR [facility] FLR;  Service: ENT;  Laterality: N/A;  Microscope, telescopes, tower, microinstruments, KTP laser, rep conf# [id number] IC 7/28.    LARYNGOSCOPY N/A 3/16/2021    Procedure:  Suspension microlaryngoscopy with excision of lesion, possible CO2 laser;  Surgeon: Stew Noel MD;  [facility];  Service: ENT;  Laterality: N/A;  Microscope, telescopes, tower, microinstruments, CO2 laser, rep conf# [id number] IC 3/4.    LARYNGOSCOPY N/A 4/1/2021    Procedure: Suspension microlaryngoscopy with KTP laser excision of lesion;  Surgeon: Stew Noel MD;  [facility];  Service: ENT;  Laterality: N/A;  Microscope, telescopes, tower, microinstruments, 70 degree scope, vocal fold , KTP laser, rep conf# [id number] BC    LARYNGOSCOPY N/A 12/9/2021    Procedure: Suspension microlaryngoscopy with biopsy;  Surgeon: Stew Noel MD;  [facility];  Service: ENT;  Laterality: N/A;  Microscope, telescopes, tower, microinstruments    LARYNGOSCOPY N/A 1/6/2022    Procedure: LARYNGOSCOPY;  Surgeon: Jesse James MD;  [facility];  Service: ENT;  Laterality: N/A;    LARYNGOSCOPY N/A 4/27/2022    Procedure: LARYNGOSCOPY WITH BIOPSY;  Surgeon: Jesse James MD;  [facility];  Service: ENT;  Laterality: N/A;    MICROLARYNGOSCOPY N/A 3/17/2020    Procedure: MICROLARYNGOSCOPY;  Surgeon: Jung Xiao MD;  Location: [facility];  Service: ENT;  Laterality: N/A;  Laser Microlaryngoscopy  NEED TO SCHEDULE LASER from [facility] [id number]    PHARYNGECTOMY  11/9/2022    Procedure: TOTAL PHARYNGECTOMY;  Surgeon: Jesse James MD;  Location: [facility] OR [address] [place] [address];  Service: ENT;;    REIMPLANTATION OF PARATHYROID TISSUE N/A 1/6/2022    Procedure: REIMPLANTATION, PARATHYROID TISSUE;  Surgeon: Jesse James MD;  [facility];  Service: ENT;  Laterality: N/A;    SKIN SPLIT GRAFT Right 11/9/2022    Procedure: APPLICATION, GRAFT, SKIN, SPLIT-THICKNESS;  Surgeon: Jesse James MD;  Location: [facility] OR [address] [place] [address];  Service: ENT;  Laterality: Right;    THYROIDECTOMY  1/6/2022    Procedure: THYROIDECTOMY;   Surgeon: Jesse James MD;  Location: [facility] OR [address] [place] [address];  Service: ENT;;    TRACHEOSTOMY N/A 12/27/2021    Procedure: CREATION, TRACHEOSTOMY;  Surgeon: Flex Espinosa MD;  Location: [facility] OR [address] [place] [address];  Service: ENT;  Laterality: N/A;     Current Outpatient Medications   Medication Sig Dispense Refill    acetaminophen (TYLENOL) 325 MG tablet Take 325 mg by mouth every 6 (six) hours as needed for Pain.      calcium carbonate 500 mg/5 mL (1,250 mg/5 mL) Take 20 mls four times daily with meals and nightly. (Patient taking differently: 40 mLs by Per G Tube route 3 (three) times daily. Take 20 mls four times daily with meals and nightly.) 2300 mL 12    esomeprazole (NEXIUM) 40 MG capsule 40 mg before breakfast.      famotidine (PEPCID) 40 mg/5 mL (8 mg/mL) suspension Give 2.5 mLs (20 mg total) by Per G Tube route 2 (two) times daily. 50 mL 11    lactose-reduced food with fibr (JEVITY 1.5 TRISHA) 0.06 gram-1.5 kcal/mL Liqd 6 cartons per day via peg.  Flush 60ml before and after each bolus (Patient taking differently: 240 mLs by Per G Tube route 5 (five) times daily. 6 cartons per day via peg.  Flush 60ml before and after each bolus) 180 each 11    levothyroxine (SYNTHROID) 100 MCG tablet 1 tablet (100 mcg total) by Per G Tube route before breakfast. 30 tablet 11    diclofenac sodium (VOLTAREN ARTHRITIS PAIN) 1 % Gel Apply 2 g topically once daily. Apply couple of times a day on the affected arthritis pain. (Patient not taking: Reported on 6/3/2024) 100 g 1    guaiFENesin-codeine 100-10 mg/5 ml (TUSSI-ORGANIDIN NR)  mg/5 mL syrup Take 5 mLs by mouth 3 (three) times daily as needed for Cough. 237 mL 0    ibuprofen (ADVIL,MOTRIN) 200 MG tablet Take 200 mg by mouth every 6 (six) hours as needed for Pain. (Patient not taking: Reported on 6/3/2024)       No current facility-administered medications for this visit.     Family History   Problem Relation Name Age of Onset    Abnormal EKG Mother Hayley     Diabetes Father  Nicolás     Heart disease Father Nicolás     Hypertension Father Nicolás       Social History     Socioeconomic History    Marital status:      Spouse name: Janel Batres    Number of children: 2   Occupational History    Occupation: [employer]     Employer: [employer]   Tobacco Use    Smoking status: Never    Smokeless tobacco: Never   Substance and Sexual Activity    Alcohol use: Not Currently     Comment: occasional    Drug use: No    Sexual activity: Yes     Partners: Female   Social History Narrative    ** Merged History Encounter **         2 children from his 1st wife     Social Determinants of Health     Financial Resource Strain: Low Risk  (2/2/2024)    Overall Financial Resource Strain (CARDIA)     Difficulty of Paying Living Expenses: Not hard at all   Food Insecurity: No Food Insecurity (2/2/2024)    Hunger Vital Sign     Worried About Running Out of Food in the Last Year: Never true     Ran Out of Food in the Last Year: Never true   Transportation Needs: No Transportation Needs (2/2/2024)    PRAPARE - Transportation     Lack of Transportation (Medical): No     Lack of Transportation (Non-Medical): No   Physical Activity: Insufficiently Active (2/2/2024)    Exercise Vital Sign     Days of Exercise per Week: 3 days     Minutes of Exercise per Session: 30 min   Stress: No Stress Concern Present (2/2/2024)    [employer] [place] [place] [employer] - Occupational Stress Questionnaire     Feeling of Stress : Not at all   Housing Stability: Low Risk  (2/2/2024)    Housing Stability Vital Sign     Unable to Pay for Housing in the Last Year: No     Number of Places Lived in the Last Year: 1     Unstable Housing in the Last Year: No       Review of Systems   Constitutional:  Negative for chills, fatigue, fever, unexpected weight change and weight loss.   HENT:  Negative for nasal congestion, ear pain, postnasal drip, rhinorrhea, sneezing and sore throat.    Eyes:  Negative for redness and eye dryness.  "  Respiratory:  Positive for cough. Negative for hemoptysis, shortness of breath and wheezing.    Cardiovascular:  Negative for chest pain.   Gastrointestinal:  Negative for abdominal pain, heartburn, nausea and vomiting.   Endocrine: Negative for polydipsia, polyphagia and polyuria.   Genitourinary: Negative.    Musculoskeletal:  Negative for arthralgias and myalgias.   Integumentary:  Negative for rash.   Allergic/Immunologic: Positive for environmental allergies.   Neurological:  Negative for headaches.   Hematological:  Negative for adenopathy. Does not bruise/bleed easily.   Psychiatric/Behavioral:  Negative for dysphoric mood and sleep disturbance.       OBJECTIVE:      Vitals:    06/03/24 0928   BP: 116/71   BP Location: Right arm   Patient Position: Sitting   BP Method: Medium (Automatic)   Pulse: 68   Temp: 96.6 °F (35.9 °C)   TempSrc: Axillary   SpO2: 99%   Weight: 68.9 kg (151 lb 12.8 oz)   Height: 5' 6" (1.676 m)     Physical Exam  Constitutional:       General: He is not in acute distress.     Appearance: He is not ill-appearing, toxic-appearing or diaphoretic.   HENT:      Head: Normocephalic.        Comments: Tracheostomy tube noted in the neck.  Fitting well.  No infection.     Right Ear: Tympanic membrane and ear canal normal.      Left Ear: Tympanic membrane and ear canal normal.      Nose: Nose normal. No congestion or rhinorrhea.      Mouth/Throat:      Mouth: Mucous membranes are moist.      Comments: Glossectomy  Eyes:      General: No scleral icterus.  Neck:      Vascular: No carotid bruit.   Cardiovascular:      Rate and Rhythm: Normal rate.   Pulmonary:      Effort: Pulmonary effort is normal. No respiratory distress.      Breath sounds: Normal breath sounds. No wheezing or rhonchi.   Abdominal:      Comments: Peg tube noted.   Musculoskeletal:         General: Normal range of motion.      Cervical back: Neck supple. No rigidity or tenderness.      Right lower leg: No edema.      Left lower " leg: No edema.   Skin:     General: Skin is warm and dry.      Coloration: Skin is not pale.   Neurological:      Mental Status: He is alert. Mental status is at baseline.   Psychiatric:         Behavior: Behavior normal.        Assessment:       1. Cough, unspecified type    2. Rhinorrhea        Plan:       Cough, unspecified type  -     guaiFENesin-codeine 100-10 mg/5 ml (TUSSI-ORGANIDIN NR)  mg/5 mL syrup; Take 5 mLs by mouth 3 (three) times daily as needed for Cough.  Dispense: 237 mL; Refill: 0    Rhinorrhea  -Claritin OTC, or Zyrtec    -refill patient's quadriceps in/codeine cough medication.  Discuss with patient to continue taking 3 times a day as needed for cough, but continue thinning secretions.  Discuss continue increase hydration at this time for thinning of secretions.  -patient deferred chest x-ray.  Lungs were clear at this time.  Discuss with patient signs and symptoms when to return to clinic, example fever, increasing sputum production, color change in sputum production, wheezing, or shortness and breath.  -discuss the continue taking Claritin as needed to help dry secretions may switch to Zyrtec if Claritin becomes ineffective.  -no antibiotics needed at this time    Follow up if symptoms worsen or fail to improve.      6/3/2024 JODI Feliciaon, BOSSMANP    This note was created using [employer] voice recognition software that occasionally misinterprets phrases or words.

## 2024-06-03 NOTE — PATIENT INSTRUCTIONS
Advised patient to remain hydrated and take the following medications for symptomatic relief:   - Alternate Tylenol 500 mg 2 tablets or Ibuprofen 200 mg 2 tablets every 4-6 hours as needed for fever, headaches, sore throat, ear pain, body aches, and/or nasal/sinus inflammation  - Regular Mucinex for chest congestion  - Nasal Saline spray (Over the counter) 2 sprays each nostril 2-3 times a day for nasal congestion  - Flonase 1 spray each nostril AM and PM for rhinitis  - Warm salt water gargles with 1/2 cup water and 1 tablespoon salt every 4 hours or Warm tea with honey and lemon for throat irritation  - Anti-tussive for cough suppression  - Decongestants no more than 3 days

## 2024-06-18 ENCOUNTER — OFFICE VISIT (OUTPATIENT)
Dept: OTOLARYNGOLOGY | Facility: CLINIC | Age: 74
End: 2024-06-18
Payer: MEDICARE

## 2024-06-18 VITALS — HEART RATE: 69 BPM | DIASTOLIC BLOOD PRESSURE: 75 MMHG | SYSTOLIC BLOOD PRESSURE: 122 MMHG

## 2024-06-18 DIAGNOSIS — C32.9 LARYNX CANCER: ICD-10-CM

## 2024-06-18 DIAGNOSIS — C01 MALIGNANT NEOPLASM OF BASE OF TONGUE: Primary | ICD-10-CM

## 2024-06-18 PROCEDURE — 99999 PR PBB SHADOW E&M-EST. PATIENT-LVL III: CPT | Mod: PBBFAC,,, | Performed by: OTOLARYNGOLOGY

## 2024-06-18 PROCEDURE — 99499 UNLISTED E&M SERVICE: CPT | Mod: S$GLB,,, | Performed by: OTOLARYNGOLOGY

## 2024-06-18 NOTE — PROGRESS NOTES
Cyrus Batres Jr. for Botox injection into bilateral parotid glands for assistance with excessive saliva.  No complaints.      Procedure:  Bilateral parotid sites prepped with alcohol.  25 units of Botox injected into both parotid glands. He tolerated the procedure well.    Return as scheduled for surveillance.

## 2024-06-26 ENCOUNTER — INFUSION (OUTPATIENT)
Dept: INFUSION THERAPY | Facility: HOSPITAL | Age: 74
End: 2024-06-26
Attending: INTERNAL MEDICINE
Payer: MEDICARE

## 2024-06-26 VITALS
RESPIRATION RATE: 18 BRPM | HEIGHT: 66 IN | DIASTOLIC BLOOD PRESSURE: 69 MMHG | SYSTOLIC BLOOD PRESSURE: 116 MMHG | TEMPERATURE: 98 F | OXYGEN SATURATION: 99 % | WEIGHT: 151.5 LBS | BODY MASS INDEX: 24.35 KG/M2 | HEART RATE: 72 BPM

## 2024-06-26 DIAGNOSIS — E86.0 DEHYDRATION: ICD-10-CM

## 2024-06-26 DIAGNOSIS — C32.9 LARYNX CANCER: Primary | ICD-10-CM

## 2024-06-26 DIAGNOSIS — C01 MALIGNANT NEOPLASM OF BASE OF TONGUE: ICD-10-CM

## 2024-06-26 PROCEDURE — 63600175 PHARM REV CODE 636 W HCPCS: Performed by: INTERNAL MEDICINE

## 2024-06-26 PROCEDURE — 96523 IRRIG DRUG DELIVERY DEVICE: CPT

## 2024-06-26 RX ORDER — SODIUM CHLORIDE 0.9 % (FLUSH) 0.9 %
10 SYRINGE (ML) INJECTION
OUTPATIENT
Start: 2024-06-26

## 2024-06-26 RX ORDER — SODIUM CHLORIDE 0.9 % (FLUSH) 0.9 %
10 SYRINGE (ML) INJECTION
Status: DISCONTINUED | OUTPATIENT
Start: 2024-06-26 | End: 2024-06-26 | Stop reason: HOSPADM

## 2024-06-26 RX ORDER — HEPARIN 100 UNIT/ML
500 SYRINGE INTRAVENOUS
OUTPATIENT
Start: 2024-06-26

## 2024-06-26 RX ORDER — HEPARIN 100 UNIT/ML
500 SYRINGE INTRAVENOUS
Status: DISCONTINUED | OUTPATIENT
Start: 2024-06-26 | End: 2024-06-26 | Stop reason: HOSPADM

## 2024-06-26 RX ADMIN — HEPARIN 500 UNITS: 100 SYRINGE at 11:06

## 2024-06-28 ENCOUNTER — TELEPHONE (OUTPATIENT)
Facility: CLINIC | Age: 74
End: 2024-06-28
Payer: MEDICARE

## 2024-06-28 ENCOUNTER — HOSPITAL ENCOUNTER (INPATIENT)
Facility: HOSPITAL | Age: 74
LOS: 2 days | Discharge: HOME | DRG: 641 | End: 2024-06-30
Attending: EMERGENCY MEDICINE | Admitting: INTERNAL MEDICINE
Payer: MEDICARE

## 2024-06-28 DIAGNOSIS — E83.51 HYPOCALCEMIA: Primary | ICD-10-CM

## 2024-06-28 DIAGNOSIS — R07.9 CHEST PAIN: ICD-10-CM

## 2024-06-28 PROBLEM — Z93.1 S/P PERCUTANEOUS ENDOSCOPIC GASTROSTOMY (PEG) TUBE PLACEMENT: Status: ACTIVE | Noted: 2024-06-28

## 2024-06-28 LAB
ALBUMIN SERPL BCP-MCNC: 4.2 G/DL (ref 3.5–5.2)
ALP SERPL-CCNC: 221 U/L (ref 55–135)
ALT SERPL W/O P-5'-P-CCNC: 54 U/L (ref 10–44)
ANION GAP SERPL CALC-SCNC: 12 MMOL/L (ref 8–16)
AST SERPL-CCNC: 45 U/L (ref 10–40)
BASOPHILS # BLD AUTO: 0.05 K/UL (ref 0–0.2)
BASOPHILS NFR BLD: 0.9 % (ref 0–1.9)
BILIRUB SERPL-MCNC: 0.9 MG/DL (ref 0.1–1)
BNP SERPL-MCNC: 66 PG/ML (ref 0–99)
BUN SERPL-MCNC: 25 MG/DL (ref 8–23)
CA-I BLDV-SCNC: 0.79 MMOL/L (ref 1.06–1.42)
CA-I BLDV-SCNC: 0.82 MMOL/L (ref 1.06–1.42)
CALCIUM SERPL-MCNC: 6.1 MG/DL (ref 8.7–10.5)
CHLORIDE SERPL-SCNC: 101 MMOL/L (ref 95–110)
CO2 SERPL-SCNC: 27 MMOL/L (ref 23–29)
CREAT SERPL-MCNC: 1.1 MG/DL (ref 0.5–1.4)
DIFFERENTIAL METHOD BLD: ABNORMAL
EOSINOPHIL # BLD AUTO: 0.3 K/UL (ref 0–0.5)
EOSINOPHIL NFR BLD: 5.3 % (ref 0–8)
ERYTHROCYTE [DISTWIDTH] IN BLOOD BY AUTOMATED COUNT: 12.8 % (ref 11.5–14.5)
EST. GFR  (NO RACE VARIABLE): >60 ML/MIN/1.73 M^2
GLUCOSE SERPL-MCNC: 132 MG/DL (ref 70–110)
GLUCOSE SERPL-MCNC: 301 MG/DL (ref 70–110)
HCT VFR BLD AUTO: 35.3 % (ref 40–54)
HGB BLD-MCNC: 11.6 G/DL (ref 14–18)
IMM GRANULOCYTES # BLD AUTO: 0.09 K/UL (ref 0–0.04)
IMM GRANULOCYTES NFR BLD AUTO: 1.6 % (ref 0–0.5)
LYMPHOCYTES # BLD AUTO: 0.7 K/UL (ref 1–4.8)
LYMPHOCYTES NFR BLD: 12.1 % (ref 18–48)
MAGNESIUM SERPL-MCNC: 1.9 MG/DL (ref 1.6–2.6)
MCH RBC QN AUTO: 31.2 PG (ref 27–31)
MCHC RBC AUTO-ENTMCNC: 32.9 G/DL (ref 32–36)
MCV RBC AUTO: 95 FL (ref 82–98)
MONOCYTES # BLD AUTO: 0.5 K/UL (ref 0.3–1)
MONOCYTES NFR BLD: 8.8 % (ref 4–15)
NEUTROPHILS # BLD AUTO: 4.1 K/UL (ref 1.8–7.7)
NEUTROPHILS NFR BLD: 71.3 % (ref 38–73)
NRBC BLD-RTO: 0 /100 WBC
PLATELET # BLD AUTO: 149 K/UL (ref 150–450)
PMV BLD AUTO: 11.8 FL (ref 9.2–12.9)
POTASSIUM SERPL-SCNC: 4.2 MMOL/L (ref 3.5–5.1)
PROT SERPL-MCNC: 7.4 G/DL (ref 6–8.4)
RBC # BLD AUTO: 3.72 M/UL (ref 4.6–6.2)
SODIUM SERPL-SCNC: 140 MMOL/L (ref 136–145)
TROPONIN I SERPL HS-MCNC: 7.2 PG/ML (ref 0–14.9)
WBC # BLD AUTO: 5.68 K/UL (ref 3.9–12.7)

## 2024-06-28 PROCEDURE — 25000003 PHARM REV CODE 250: Mod: JZ,JG | Performed by: EMERGENCY MEDICINE

## 2024-06-28 PROCEDURE — 83735 ASSAY OF MAGNESIUM: CPT | Performed by: EMERGENCY MEDICINE

## 2024-06-28 PROCEDURE — 63600175 PHARM REV CODE 636 W HCPCS: Performed by: INTERNAL MEDICINE

## 2024-06-28 PROCEDURE — 83880 ASSAY OF NATRIURETIC PEPTIDE: CPT | Performed by: EMERGENCY MEDICINE

## 2024-06-28 PROCEDURE — 82330 ASSAY OF CALCIUM: CPT | Mod: 91 | Performed by: INTERNAL MEDICINE

## 2024-06-28 PROCEDURE — 84484 ASSAY OF TROPONIN QUANT: CPT | Performed by: EMERGENCY MEDICINE

## 2024-06-28 PROCEDURE — 80053 COMPREHEN METABOLIC PANEL: CPT | Mod: 91 | Performed by: EMERGENCY MEDICINE

## 2024-06-28 PROCEDURE — 25000003 PHARM REV CODE 250: Performed by: INTERNAL MEDICINE

## 2024-06-28 PROCEDURE — 82330 ASSAY OF CALCIUM: CPT | Performed by: EMERGENCY MEDICINE

## 2024-06-28 PROCEDURE — 21400001 HC TELEMETRY ROOM

## 2024-06-28 PROCEDURE — 93010 ELECTROCARDIOGRAM REPORT: CPT | Mod: ,,, | Performed by: INTERNAL MEDICINE

## 2024-06-28 PROCEDURE — 85025 COMPLETE CBC W/AUTO DIFF WBC: CPT | Mod: 91 | Performed by: EMERGENCY MEDICINE

## 2024-06-28 PROCEDURE — 93005 ELECTROCARDIOGRAM TRACING: CPT | Performed by: INTERNAL MEDICINE

## 2024-06-28 RX ORDER — SODIUM,POTASSIUM PHOSPHATES 280-250MG
2 POWDER IN PACKET (EA) ORAL
Status: DISCONTINUED | OUTPATIENT
Start: 2024-06-28 | End: 2024-06-30 | Stop reason: HOSPADM

## 2024-06-28 RX ORDER — TALC
6 POWDER (GRAM) TOPICAL NIGHTLY PRN
Status: DISCONTINUED | OUTPATIENT
Start: 2024-06-28 | End: 2024-06-30 | Stop reason: HOSPADM

## 2024-06-28 RX ORDER — INSULIN ASPART 100 [IU]/ML
0-10 INJECTION, SOLUTION INTRAVENOUS; SUBCUTANEOUS
Status: DISCONTINUED | OUTPATIENT
Start: 2024-06-28 | End: 2024-06-30 | Stop reason: HOSPADM

## 2024-06-28 RX ORDER — ACETAMINOPHEN 325 MG/1
650 TABLET ORAL EVERY 4 HOURS PRN
Status: DISCONTINUED | OUTPATIENT
Start: 2024-06-28 | End: 2024-06-30 | Stop reason: HOSPADM

## 2024-06-28 RX ORDER — CALCIUM CARBONATE 200(500)MG
2000 TABLET,CHEWABLE ORAL 3 TIMES DAILY
Status: DISCONTINUED | OUTPATIENT
Start: 2024-06-28 | End: 2024-06-29

## 2024-06-28 RX ORDER — MUPIROCIN 20 MG/G
OINTMENT TOPICAL 2 TIMES DAILY
Status: DISCONTINUED | OUTPATIENT
Start: 2024-06-28 | End: 2024-06-30 | Stop reason: HOSPADM

## 2024-06-28 RX ORDER — ACETAMINOPHEN 325 MG/1
650 TABLET ORAL EVERY 8 HOURS PRN
Status: DISCONTINUED | OUTPATIENT
Start: 2024-06-28 | End: 2024-06-30 | Stop reason: HOSPADM

## 2024-06-28 RX ORDER — CALCIUM CARBONATE 1250 MG/5ML
2000 SUSPENSION ORAL 3 TIMES DAILY
Status: DISCONTINUED | OUTPATIENT
Start: 2024-06-28 | End: 2024-06-28

## 2024-06-28 RX ORDER — ENOXAPARIN SODIUM 100 MG/ML
30 INJECTION SUBCUTANEOUS EVERY 24 HOURS
Status: DISCONTINUED | OUTPATIENT
Start: 2024-06-28 | End: 2024-06-28

## 2024-06-28 RX ORDER — ALUMINUM HYDROXIDE, MAGNESIUM HYDROXIDE, AND SIMETHICONE 1200; 120; 1200 MG/30ML; MG/30ML; MG/30ML
30 SUSPENSION ORAL 4 TIMES DAILY PRN
Status: DISCONTINUED | OUTPATIENT
Start: 2024-06-28 | End: 2024-06-30 | Stop reason: HOSPADM

## 2024-06-28 RX ORDER — LANOLIN ALCOHOL/MO/W.PET/CERES
800 CREAM (GRAM) TOPICAL
Status: DISCONTINUED | OUTPATIENT
Start: 2024-06-28 | End: 2024-06-30 | Stop reason: HOSPADM

## 2024-06-28 RX ORDER — CALCIUM GLUCONATE 20 MG/ML
1 INJECTION, SOLUTION INTRAVENOUS
Status: COMPLETED | OUTPATIENT
Start: 2024-06-28 | End: 2024-06-28

## 2024-06-28 RX ORDER — IBUPROFEN 200 MG
24 TABLET ORAL
Status: DISCONTINUED | OUTPATIENT
Start: 2024-06-28 | End: 2024-06-30 | Stop reason: HOSPADM

## 2024-06-28 RX ORDER — ONDANSETRON HYDROCHLORIDE 2 MG/ML
4 INJECTION, SOLUTION INTRAVENOUS EVERY 6 HOURS PRN
Status: DISCONTINUED | OUTPATIENT
Start: 2024-06-28 | End: 2024-06-30 | Stop reason: HOSPADM

## 2024-06-28 RX ORDER — HYDROCODONE BITARTRATE AND ACETAMINOPHEN 5; 325 MG/1; MG/1
1 TABLET ORAL EVERY 6 HOURS PRN
Status: DISCONTINUED | OUTPATIENT
Start: 2024-06-28 | End: 2024-06-30 | Stop reason: HOSPADM

## 2024-06-28 RX ORDER — GLUCAGON 1 MG
1 KIT INJECTION
Status: DISCONTINUED | OUTPATIENT
Start: 2024-06-28 | End: 2024-06-30 | Stop reason: HOSPADM

## 2024-06-28 RX ORDER — IBUPROFEN 200 MG
16 TABLET ORAL
Status: DISCONTINUED | OUTPATIENT
Start: 2024-06-28 | End: 2024-06-30 | Stop reason: HOSPADM

## 2024-06-28 RX ORDER — NALOXONE HCL 0.4 MG/ML
0.02 VIAL (ML) INJECTION
Status: DISCONTINUED | OUTPATIENT
Start: 2024-06-28 | End: 2024-06-30 | Stop reason: HOSPADM

## 2024-06-28 RX ORDER — CALCIUM CARBONATE 1250 MG/5ML
4000 SUSPENSION ORAL 3 TIMES DAILY
Status: DISCONTINUED | OUTPATIENT
Start: 2024-06-28 | End: 2024-06-28

## 2024-06-28 RX ORDER — FAMOTIDINE 20 MG/1
20 TABLET, FILM COATED ORAL 2 TIMES DAILY
Status: DISCONTINUED | OUTPATIENT
Start: 2024-06-28 | End: 2024-06-30 | Stop reason: HOSPADM

## 2024-06-28 RX ORDER — ENOXAPARIN SODIUM 100 MG/ML
40 INJECTION SUBCUTANEOUS EVERY 24 HOURS
Status: DISCONTINUED | OUTPATIENT
Start: 2024-06-28 | End: 2024-06-30 | Stop reason: HOSPADM

## 2024-06-28 RX ORDER — LEVOTHYROXINE SODIUM 100 UG/1
100 TABLET ORAL
Status: DISCONTINUED | OUTPATIENT
Start: 2024-06-29 | End: 2024-06-30 | Stop reason: HOSPADM

## 2024-06-28 RX ADMIN — ENOXAPARIN SODIUM 40 MG: 40 INJECTION SUBCUTANEOUS at 06:06

## 2024-06-28 RX ADMIN — CALCIUM CARBONATE (ANTACID) CHEW TAB 500 MG 2000 MG: 500 CHEW TAB at 09:06

## 2024-06-28 RX ADMIN — CALCIUM GLUCONATE 1 G: 20 INJECTION, SOLUTION INTRAVENOUS at 03:06

## 2024-06-28 RX ADMIN — FAMOTIDINE 20 MG: 20 TABLET ORAL at 09:06

## 2024-06-28 RX ADMIN — INSULIN ASPART 4 UNITS: 100 INJECTION, SOLUTION INTRAVENOUS; SUBCUTANEOUS at 10:06

## 2024-06-28 NOTE — HPI
73 year old pt getting admitted with worsening hypocalcemia  Pt has Chronic hypocalcemia and is on high doses of Ca Gluconate tablets  Today Outpatient labs showed very low serum Ca and pt was referred to ER  EKG showed long QT  Pt denied any other symptoms

## 2024-06-28 NOTE — ASSESSMENT & PLAN NOTE
He was deemed not to be a candidate for any further radiation and did not want to undergo any further surgery as this would necessitate a glossectomy procedure.

## 2024-06-28 NOTE — ASSESSMENT & PLAN NOTE
Acute on chronic issue  Maintain home dose Ca Gluconate  Check Ionized Ca every 6 hrs  He may need further doses of ca gluconate vs Ca CL pushes  If no improvement then need icu visit for CaCl infusion  Pt totally asymptomatic at the moment     Recent Labs   Lab 06/28/24  1331   CALCIUM 6.1*       Recent Labs   Lab 06/28/24  1514   CAION 0.79*

## 2024-06-28 NOTE — ASSESSMENT & PLAN NOTE
Chronic, controlled. Latest blood pressure and vitals reviewed-     Temp:  [97.7 °F (36.5 °C)]   Pulse:  [56-67]   Resp:  [16]   BP: (122-139)/(66-69)   SpO2:  [99 %-100 %] .   Home meds for hypertension were reviewed and noted below.       While in the hospital, will manage blood pressure as follows; Continue home antihypertensive regimen    Will utilize p.r.n. blood pressure medication only if patient's blood pressure greater than 140/90 and he develops symptoms such as worsening chest pain or shortness of breath.

## 2024-06-28 NOTE — ED PROVIDER NOTES
Encounter Date: 6/28/2024       History     Chief Complaint   Patient presents with    abnormal labs     States low calcium     72-year-old  male with history of hypertension, hypothyroidism, diabetes type 2, GERD , laryngeal cancer and malignant neoplasm of base of tongue  who [place] glossect[place] with lateral thigh reconstruction and [place] [facility] [place]  presents to the ED on account of evaluation for hypocalcemia.  Patient on outpatient labs found to have potassium of 6.2.  With albumin of 4.  Patient did state that he would history of previously having low calcium.  Patient has been on calcium carbonate supplement 1250 mg daily as needed.  Patient currently denies any recent vomiting, no diarrhea, does admit to generalized weakness however denies any other constitutional symptoms including abdominal pain, no palpitations, no shortness of breath.      Review of patient's allergies indicates:   Allergen Reactions    Lovastatin Itching    Pollen extracts     Lovastatin Rash     Not confirmed but pt skeptical     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    Tongue cancer     Tracheostomy dependence     Type 2 diabetes mellitus, without long-term current use of insulin 11/22/2022    Unspecified glaucoma      Past Surgical History:   Procedure Laterality Date    CATARACT EXTRACTION W/  INTRAOCULAR LENS IMPLANT  Right 8/16/2023    Procedure: CEIOL OD  NPO-peg;  Surgeon: Stoney Munoz MD;  Location: [facility] OR;  Service: Ophthalmology;  Laterality: Right;    CATARACT EXTRACTION W/  INTRAOCULAR LENS IMPLANT Left 10/18/2023    Procedure: CEIOL OS npo peg;  Surgeon: Stoney Munoz MD;  [facility];  Service: Ophthalmology;  Laterality: Left;    DIRECT LARYNGOBRONCHOSCOPY N/A 12/27/2021    Procedure: LARYNGOSCOPY, DIRECT, WITH BRONCHOSCOPY;  Surgeon: Flex Espinosa MD;  [address];  Service: ENT;  Laterality: N/A;    DIRECT LARYNGOSCOPY  11/9/2022    Procedure: LARYNGOSCOPY, DIRECT;  Surgeon: Jesse James MD;  [facility];  Service: ENT;;    DISSECTION OF NECK Bilateral 1/6/2022    Procedure: DISSECTION, NECK;  Surgeon: Jesse James MD;  [facility];  Service: ENT;  Laterality: Bilateral;    DISSECTION OF NECK Bilateral 11/9/2022    Procedure: DISSECTION, NECK;  Surgeon: Jesse James MD;  Location: [address];  Service: ENT;  Laterality: Bilateral;    ESOPHAGOGASTRODUODENOSCOPY N/A 4/9/2024    Procedure: EGD (ESOPHAGOGASTRODUODENOSCOPY);  Surgeon: Matheus Cooper III, MD;  Location: [facility];  Service: Endoscopy;  Laterality: N/A;    FLAP PROCEDURE Right 1/6/2022    Procedure: CREATION, FREE FLAP;  Surgeon: Alise Hart MD;  [facility];  Service: ENT;  Laterality: Right;  Ischemic start 1351  Ischemic stop 1502    FLAP PROCEDURE Left 11/9/2022    Procedure: CREATION, FREE FLAP, ALT;  Surgeon: Alise Hart MD;  [address];  Service: ENT;  Laterality: Left;    GLOSSECTOMY Bilateral 11/9/2022    Procedure: TOTAL GLOSSECTOMY;  Surgeon: Jesse James MD;  Location: [facility] OR [address] [place] [address];  Service: ENT;  Laterality: Bilateral;    INSERTION OF TUNNELED CENTRAL VENOUS CATHETER (CVC) WITH SUBCUTANEOUS PORT N/A 6/9/2022    Procedure: [id number];  Surgeon: Jesus Viera MD;  Location: [facility] OR;   Service: General;  Laterality: N/A;    INSERTION OF TUNNELED CENTRAL VENOUS CATHETER (CVC) WITH SUBCUTANEOUS PORT Right 1/12/2023    Procedure: [id number]-[id number];  Surgeon: Abdulaziz Le Jr., MD;  Location: [facility];  Service: General;  Laterality: Right;    LARYNGECTOMY N/A 1/6/2022    Procedure: LARYNGECTOMY;  Surgeon: Jesse James MD;  [facility];  Service: ENT;  Laterality: N/A;    LARYNGOSCOPY N/A 8/4/2020    Procedure: Suspension microlaryngoscopy with biopsy, possible KTP laser treatment/excision;  Surgeon: Stew Noel MD;  [facility];  Service: ENT;  Laterality: N/A;  Microscope, telescopes, tower, microinstruments, KTP laser, rep conf# [id number] IC 7/28.    LARYNGOSCOPY N/A 3/16/2021    Procedure: Suspension microlaryngoscopy with excision of lesion, possible CO2 laser;  Surgeon: Stew Noel MD;  [facility];  Service: ENT;  Laterality: N/A;  Microscope, telescopes, tower, microinstruments, CO2 laser, rep conf# [id number] IC 3/4.    LARYNGOSCOPY N/A 4/1/2021    Procedure: Suspension microlaryngoscopy with KTP laser excision of lesion;  Surgeon: Stew Noel MD;  [facility];  Service: ENT;  Laterality: N/A;  Microscope, telescopes, tower, microinstruments, 70 degree scope, vocal fold , KTP laser, rep conf# [id number] BC    LARYNGOSCOPY N/A 12/9/2021    Procedure: Suspension microlaryngoscopy with biopsy;  Surgeon: Stew Noel MD;  [facility];  Service: ENT;  Laterality: N/A;  Microscope, telescopes, tower, microinstruments    LARYNGOSCOPY N/A 1/6/2022    Procedure: LARYNGOSCOPY;  Surgeon: Jesse James MD;  [facility];  Service: ENT;  Laterality: N/A;    LARYNGOSCOPY N/A 4/27/2022    Procedure: LARYNGOSCOPY WITH BIOPSY;  Surgeon: Jesse James MD;  Location: [facility] OR [address] [place] [address];  Service: ENT;  Laterality: N/A;    MICROLARYNGOSCOPY N/A 3/17/2020    Procedure:  MICROLARYNGOSCOPY;  Surgeon: Jung Xiao MD;  Location: [facility] OR;  Service: ENT;  Laterality: N/A;  Laser Microlaryngoscopy  NEED TO SCHEDULE LASER from [facility] [id number]    PHARYNGECTOMY  11/9/2022    Procedure: TOTAL PHARYNGECTOMY;  Surgeon: Jesse James MD;  [facility];  Service: ENT;;    REIMPLANTATION OF PARATHYROID TISSUE N/A 1/6/2022    Procedure: REIMPLANTATION, PARATHYROID TISSUE;  Surgeon: Jesse James MD;  [facility];  Service: ENT;  Laterality: N/A;    SKIN SPLIT GRAFT Right 11/9/2022    Procedure: APPLICATION, GRAFT, SKIN, SPLIT-THICKNESS;  Surgeon: Jesse James MD;  [facility];  Service: ENT;  Laterality: Right;    THYROIDECTOMY  1/6/2022    Procedure: THYROIDECTOMY;  Surgeon: Jesse James MD;  [facility];  Service: ENT;;    TRACHEOSTOMY N/A 12/27/2021    Procedure: CREATION, TRACHEOSTOMY;  Surgeon: Flex Espinosa MD;  [facility];  Service: ENT;  Laterality: N/A;     Family History   Problem Relation Name Age of Onset    Abnormal EKG Mother Hayley     Diabetes Father Nicolás     Heart disease Father Nicolás     Hypertension Father Nicolás      Social History     Tobacco Use    Smoking status: Never    Smokeless tobacco: Never   Substance Use Topics    Alcohol use: Not Currently     Comment: occasional    Drug use: No     Review of Systems   Constitutional:  Negative for fever.   HENT:  Negative for sore throat.    Respiratory:  Negative for shortness of breath.    Cardiovascular:  Negative for chest pain.   Gastrointestinal:  Negative for nausea and vomiting.   Genitourinary:  Negative for dysuria.   Musculoskeletal:  Negative for arthralgias, back pain and myalgias.   Skin:  Negative for rash.   Neurological:  Positive for weakness. Negative for light-headedness and headaches.   Hematological:  Does not bruise/bleed easily.   Psychiatric/Behavioral:  Negative for confusion and  self-injury. The patient is not nervous/anxious.        Physical Exam     Initial Vitals [06/28/24 1231]   BP Pulse Resp Temp SpO2   122/68 67 16 97.7 °F (36.5 °C) 99 %      MAP       --         Physical Exam    Nursing note and vitals reviewed.  Constitutional: He appears well-developed. No distress.   Thin appearing male no acute distress   HENT:   Head: Normocephalic and atraumatic.   Nose: Nose normal.   Mouth/Throat: Oropharynx is clear and moist.   Eyes: Conjunctivae and EOM are normal. Pupils are equal, round, and reactive to light. No scleral icterus.   Neck: Neck supple.   Trach stoma clean dry and intact with dressing applied   Normal range of motion.  Cardiovascular:  Normal rate, regular rhythm, normal heart sounds and intact distal pulses.     Exam reveals no gallop and no friction rub.       No murmur heard.  Pulmonary/Chest: No stridor. No respiratory distress.   Course bilateral breath sounds no adventitious sounds   Abdominal: Abdomen is soft. Bowel sounds are normal. He exhibits no mass. There is no abdominal tenderness. There is no rebound and no guarding.   Musculoskeletal:         General: No edema. Normal range of motion.      Cervical back: Normal range of motion and neck supple.     Lymphadenopathy:     He has no cervical adenopathy.   Neurological: He is alert and oriented to person, place, and time. He has normal strength and normal reflexes. No cranial nerve deficit or sensory deficit. GCS score is 15. GCS eye subscore is 4. GCS verbal subscore is 5. GCS motor subscore is 6.   Skin: Skin is warm and dry. Capillary refill takes less than 2 seconds. No rash noted.   Psychiatric: He has a normal mood and affect. His behavior is normal. Judgment and thought content normal.         ED Course   Procedures  Labs Reviewed   CBC W/ AUTO DIFFERENTIAL - Abnormal; Notable for the following components:       Result Value    RBC 3.72 (*)     Hemoglobin 11.6 (*)     Hematocrit 35.3 (*)     MCH 31.2 (*)      Platelets 149 (*)     Immature Granulocytes 1.6 (*)     Immature Grans (Abs) 0.09 (*)     Lymph # 0.7 (*)     Lymph % 12.1 (*)     All other components within normal limits   MAGNESIUM   TROPONIN I HIGH SENSITIVITY   B-TYPE NATRIURETIC PEPTIDE   COMPREHENSIVE METABOLIC PANEL   CALCIUM, IONIZED        ECG Results              EKG 12-lead (In process)        Collection Time Result Time QRS Duration OHS QTC Calculation    06/28/24 13:27:26 06/28/24 13:52:45 78 464                     In process by Interface, Lab In [facility] (06/28/24 13:52:47)                   Narrative:    Test Reason : R07.9,    Vent. Rate : 060 BPM     Atrial Rate : 060 BPM     P-R Int : 168 ms          QRS Dur : 078 ms      QT Int : 464 ms       P-R-T Axes : 052 -07 028 degrees     QTc Int : 464 ms    Normal sinus rhythm  Normal ECG  When compared with ECG of 04-APR-2024 10:12,  Nonspecific T wave abnormality has replaced inverted T waves in Inferior  leads  QT has lengthened    Referred By: [email or web address]   SELF           Confirmed By:                                   Imaging Results              X-Ray Chest AP Portable (Final result)  Result time 06/28/24 13:24:38      Final result by Jose De Jesus Oleary MD (06/28/24 13:24:38)                   Impression:      No acute pulmonary process.      Electronically signed by: Jose De Jesus Oleary  Date:    06/28/2024  Time:    13:24               Narrative:    EXAMINATION:  XR CHEST AP PORTABLE    CLINICAL HISTORY:  Chest Pain;    COMPARISON:  03/20/2023    FINDINGS:  Cardiac silhouette size is within normal limits.  Right-sided Port-A-Cath is in stable position.    No confluent airspace disease or pulmonary edema.  No large pleural effusion or pneumothorax.  No acute osseous abnormality.                                       Medications   calcium gluconate 1 g in NS IVPB (premixed) (has no administration in time range)     Medical Decision Making  Amount and/or Complexity of Data Reviewed  Labs:  ordered.  Radiology: ordered.              Attending Attestation:         Attending Critical Care:   Critical Care Times:   Direct Patient Care (initial evaluation, reassessments, and time considering the case)................................................................30 minutes.   Additional History from reviewing old medical records or taking additional history from the family, EMS, PCP, etc.......................5 minutes.   Ordering, Reviewing, and Interpreting Diagnostic Studies...............................................................................................................5 minutes.   Documentation..................................................................................................................................................................................5 minutes.   Consultation with other Physicians. .................................................................................................................................................5 minutes.   Consultation with the patient's family directly relating to the patient's condition, care, and DNR status (when patient unable)......5 minutes.   Other..................................................................................................................................................................................................5 minutes.   ==============================================================  Total Critical Care Time - exclusive of procedural time: 60 minutes.  ==============================================================  Critical care was necessary to treat or prevent imminent or life-threatening deterioration of the following conditions: metabolic crisis.   Critical care was time spent personally by me on the following activities: obtaining history from patient or relative, examination of patient, review of x-rays / CT sent with the patient, review of old charts, ordering lab, x-rays, and/or  EKG, development of treatment plan with patient or relative, ordering and performing treatments and interventions, evaluation of patient's response to treatment, discussion with consultants and re-evaluation of patient's conition.   Critical Care Condition: potentially life-threatening                      Medical Decision Making:   Initial Assessment:   72-year-old  male with history of hypertension, hypothyroidism, diabetes type 2, GERD , laryngeal cancer and malignant neoplasm of base of tongue  who underwent a total glossectomy pharyngectomy with lateral thigh reconstruction and skin graft on 11/09/2022 at [facility] [place] .  He  presents to the ED on account of evaluation for hypocalcemia.  Patient on outpatient labs found to have potassium of 6.2.  With albumin of 4.  Patient did state that he would history of previously having low calcium.  Patient has been on calcium carbonate supplement 1250 mg daily as needed.  Patient currently denies any recent vomiting, no diarrhea, does admit to generalized weakness however denies any other constitutional symptoms including abdominal pain, no palpitations, no shortness of breath.    Differential Diagnosis:   Hypocalcemia, hypokalemia, hypomagnesemia, hypoalbuminemia, electrolyte abnormality  Clinical Tests:   Lab Tests: Ordered and Reviewed  Radiological Study: Ordered and Reviewed  Medical Tests: Ordered and Reviewed             Clinical Impression:  Final diagnoses:  [E83.51] Hypocalcemia (Primary)          ED Disposition Condition    Admit Stable                Nicolás Gant MD  06/28/24 [id number]

## 2024-06-28 NOTE — SUBJECTIVE & OBJECTIVE
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    Tongue cancer     Tracheostomy dependence     Type 2 diabetes mellitus, without long-term current use of insulin 11/22/2022    Unspecified glaucoma        Past Surgical History:   Procedure Laterality Date    CATARACT EXTRACTION W/  INTRAOCULAR LENS IMPLANT Right 8/16/2023    Procedure: CEIOL OD  NPO-peg;  Surgeon: Stoney Munoz MD;  Location: [facility] OR;  Service: Ophthalmology;  Laterality: Right;    CATARACT EXTRACTION W/  INTRAOCULAR LENS IMPLANT Left 10/18/2023    Procedure: CEIOL OS npo peg;  Surgeon: Stoney Munoz MD;  Location: [facility] OR;  Service: Ophthalmology;  Laterality: Left;    DIRECT LARYNGOBRONCHOSCOPY N/A 12/27/2021    Procedure: LARYNGOSCOPY, DIRECT, WITH BRONCHOSCOPY;  Surgeon: Flex Espinosa MD;  Location: [facility] OR [address] [place] [address];  Service: ENT;  Laterality: N/A;    DIRECT LARYNGOSCOPY  11/9/2022    Procedure: LARYNGOSCOPY, DIRECT;  Surgeon: Jesse James MD;  Location: [facility] OR [address] [place] [address];  Service: ENT;;    DISSECTION OF NECK Bilateral 1/6/2022    Procedure: DISSECTION, NECK;  Surgeon: Jesse James MD;  [facility];  Service: ENT;  Laterality: Bilateral;    DISSECTION OF NECK Bilateral 11/9/2022    Procedure: DISSECTION, NECK;  Surgeon: Jesse James MD;  Location: [facility] OR [address] [place] [address];  Service: ENT;  Laterality: Bilateral;    ESOPHAGOGASTRODUODENOSCOPY N/A  4/9/2024    Procedure: EGD (ESOPHAGOGASTRODUODENOSCOPY);  Surgeon: Matheus Cooper III, MD;  Location: [facility] ENDO;  Service: Endoscopy;  Laterality: N/A;    FLAP PROCEDURE Right 1/6/2022    Procedure: CREATION, FREE FLAP;  Surgeon: Alise Hart MD;  Location: [facility] OR [facility] FLR;  Service: ENT;  Laterality: Right;  Ischemic start 1351  Ischemic stop 1502    FLAP PROCEDURE Left 11/9/2022    Procedure: CREATION, FREE FLAP, ALT;  Surgeon: Alise Hart MD;  Location: [facility] OR [facility] FLR;  Service: ENT;  Laterality: Left;    GLOSSECTOMY Bilateral 11/9/2022    Procedure: TOTAL GLOSSECTOMY;  Surgeon: Jesse James MD;  Location: [facility] OR [facility] FLR;  Service: ENT;  Laterality: Bilateral;    INSERTION OF TUNNELED CENTRAL VENOUS CATHETER (CVC) WITH SUBCUTANEOUS PORT N/A 6/9/2022    Procedure: [id number];  Surgeon: Jesus Viera MD;  Location: [facility] OR;  Service: General;  Laterality: N/A;    INSERTION OF TUNNELED CENTRAL VENOUS CATHETER (CVC) WITH SUBCUTANEOUS PORT Right 1/12/2023    Procedure: [id number]-[id number];  Surgeon: Abdulaziz Le Jr., MD;  Location: [facility] OR;  Service: General;  Laterality: Right;    LARYNGECTOMY N/A 1/6/2022    Procedure: LARYNGECTOMY;  Surgeon: Jesse James MD;  [facility];  Service: ENT;  Laterality: N/A;    LARYNGOSCOPY N/A 8/4/2020    Procedure: Suspension microlaryngoscopy with biopsy, possible KTP laser treatment/excision;  Surgeon: Stew Noel MD;  Location: [facility] OR [facility];  Service: ENT;  Laterality: N/A;  Microscope, telescopes, tower, microinstruments, KTP laser, rep conf# [id number] IC 7/28.    LARYNGOSCOPY N/A 3/16/2021    Procedure: Suspension microlaryngoscopy with excision of lesion, possible CO2 laser;  Surgeon: Stew Noel MD;  Location: [facility] OR [facility];  Service: ENT;  Laterality: N/A;  Microscope, telescopes, tower, microinstruments, CO2 laser, rep conf# [id number] IC 3/4.    LARYNGOSCOPY N/A 4/1/2021    Procedure:  Suspension microlaryngoscopy with KTP laser excision of lesion;  Surgeon: Stew Noel MD;  [facility];  Service: ENT;  Laterality: N/A;  Microscope, telescopes, tower, microinstruments, 70 degree scope, vocal fold , KTP laser, rep conf# [id number] BC    LARYNGOSCOPY N/A 12/9/2021    Procedure: Suspension microlaryngoscopy with biopsy;  Surgeon: Stew Noel MD;  [facility];  Service: ENT;  Laterality: N/A;  Microscope, telescopes, tower, microinstruments    LARYNGOSCOPY N/A 1/6/2022    Procedure: LARYNGOSCOPY;  Surgeon: Jesse James MD;  [facility];  Service: ENT;  Laterality: N/A;    LARYNGOSCOPY N/A 4/27/2022    Procedure: LARYNGOSCOPY WITH BIOPSY;  Surgeon: Jesse James MD;  Location: [facility] OR [address] [place] [address];  Service: ENT;  Laterality: N/A;    MICROLARYNGOSCOPY N/A 3/17/2020    Procedure: MICROLARYNGOSCOPY;  Surgeon: Jung Xiao MD;  Location: [facility];  Service: ENT;  Laterality: N/A;  Laser Microlaryngoscopy  NEED TO SCHEDULE LASER from [facility] [id number]    PHARYNGECTOMY  11/9/2022    Procedure: TOTAL PHARYNGECTOMY;  Surgeon: Jesse James MD;  Location: [facility] OR [address] [place] [address];  Service: ENT;;    REIMPLANTATION OF PARATHYROID TISSUE N/A 1/6/2022    Procedure: REIMPLANTATION, PARATHYROID TISSUE;  Surgeon: Jesse James MD;  [facility];  Service: ENT;  Laterality: N/A;    SKIN SPLIT GRAFT Right 11/9/2022    Procedure: APPLICATION, GRAFT, SKIN, SPLIT-THICKNESS;  Surgeon: Jesse James MD;  Location: [facility] OR [address] [place] [address];  Service: ENT;  Laterality: Right;    THYROIDECTOMY  1/6/2022    Procedure: THYROIDECTOMY;  Surgeon: Jesse James MD;  Location: [facility] OR [address] [place] [address];  Service: ENT;;    TRACHEOSTOMY N/A 12/27/2021    Procedure: CREATION, TRACHEOSTOMY;  Surgeon: Flex Espinosa MD;  Location: [facility] OR [address] [place] [address];  Service: ENT;  Laterality: N/A;       Review of patient's allergies indicates:   Allergen  Reactions    Lovastatin Itching    Pollen extracts     Lovastatin Rash     Not confirmed but pt skeptical       No current facility-administered medications on file prior to encounter.     Current Outpatient Medications on File Prior to Encounter   Medication Sig    calcium carbonate 500 mg/5 mL (1,250 mg/5 mL) Take 20 mls four times daily with meals and nightly. (Patient taking differently: 40 mLs by Per G Tube route 3 (three) times daily. Take 20 mls four times daily with meals and nightly.)    esomeprazole (NEXIUM) 40 MG capsule 40 mg before breakfast.    guaiFENesin-codeine 100-10 mg/5 ml (TUSSI-ORGANIDIN NR)  mg/5 mL syrup Take 5 mLs by mouth 3 (three) times daily as needed for Cough.    lactose-reduced food with fibr (JEVITY 1.5 TRISHA) 0.06 gram-1.5 kcal/mL Liqd 6 cartons per day via peg.  Flush 60ml before and after each bolus (Patient taking differently: 240 mLs by Per G Tube route 5 (five) times daily. 6 cartons per day via peg.  Flush 60ml before and after each bolus)    acetaminophen (TYLENOL) 325 MG tablet Take 325 mg by mouth every 6 (six) hours as needed for Pain.    diclofenac sodium (VOLTAREN ARTHRITIS PAIN) 1 % Gel Apply 2 g topically once daily. Apply couple of times a day on the affected arthritis pain. (Patient not taking: Reported on 6/3/2024)    famotidine (PEPCID) 40 mg/5 mL (8 mg/mL) suspension Give 2.5 mLs (20 mg total) by Per G Tube route 2 (two) times daily.    ibuprofen (ADVIL,MOTRIN) 200 MG tablet Take 200 mg by mouth every 6 (six) hours as needed for Pain. (Patient not taking: Reported on 6/3/2024)    levothyroxine (SYNTHROID) 100 MCG tablet 1 tablet (100 mcg total) by Per G Tube route before breakfast.    [DISCONTINUED] aspirin (ECOTRIN) 81 MG EC tablet Take 81 mg by mouth Daily.     Family History       Problem Relation (Age of Onset)    Abnormal EKG Mother    Diabetes Father    Heart disease Father    Hypertension Father          Tobacco Use    Smoking status: Never    Smokeless  tobacco: Never   Substance and Sexual Activity    Alcohol use: Not Currently     Comment: occasional    Drug use: No    Sexual activity: Yes     Partners: Female     Review of Systems   Constitutional:  Positive for fatigue. Negative for activity change and appetite change.   HENT:  Negative for congestion and dental problem.    Eyes:  Negative for discharge and itching.   Respiratory:  Negative for shortness of breath.    Cardiovascular:  Negative for chest pain.   Gastrointestinal:  Negative for abdominal distention and abdominal pain.   Endocrine: Negative for cold intolerance.   Genitourinary:  Negative for difficulty urinating and dysuria.   Musculoskeletal:  Negative for arthralgias and back pain.   Skin:  Negative for color change.   Neurological:  Negative for dizziness and facial asymmetry.   Hematological:  Negative for adenopathy.   Psychiatric/Behavioral:  Negative for agitation and behavioral problems.      Objective:     Vital Signs (Most Recent):  Temp: 97.7 °F (36.5 °C) (06/28/24 1231)  Pulse: 64 (06/28/24 1531)  Resp: 16 (06/28/24 1231)  BP: 139/69 (06/28/24 1531)  SpO2: 100 % (06/28/24 1531) Vital Signs (24h Range):  Temp:  [97.7 °F (36.5 °C)] 97.7 °F (36.5 °C)  Pulse:  [56-67] 64  Resp:  [16] 16  SpO2:  [99 %-100 %] 100 %  BP: (122-139)/(66-69) 139/69     Weight: 68.5 kg (151 lb)  Body mass index is 24.37 kg/m².     Physical Exam  Vitals and nursing note reviewed.   Constitutional:       Appearance: He is well-developed.   HENT:      Head: Atraumatic.      Right Ear: External ear normal.      Left Ear: External ear normal.      Nose: Nose normal.      Mouth/Throat:      Mouth: Mucous membranes are moist.   Eyes:      Extraocular Movements: Extraocular movements intact.   Cardiovascular:      Rate and Rhythm: Normal rate.   Pulmonary:      Effort: Pulmonary effort is normal.   Abdominal:      Palpations: Abdomen is soft.   Musculoskeletal:         General: Normal range of motion.      Cervical  back: Full passive range of motion without pain and normal range of motion.   Skin:     General: Skin is warm.   Neurological:      Mental Status: He is alert and oriented to person, place, and time.   Psychiatric:         Behavior: Behavior normal.                Significant Labs: All pertinent labs within the past 24 hours have been reviewed.  CBC:   Recent Labs   Lab 06/28/24  1035 06/28/24  1331   WBC 5.30 5.68   HGB 11.5* 11.6*   HCT 34.6* 35.3*    149*     CMP:   Recent Labs   Lab 06/28/24  1035 06/28/24  1331    140   K 4.0 4.2    101   CO2 28 27   * 132*   BUN 25* 25*   CREATININE 1.1 1.1   CALCIUM 6.2* 6.1*   PROT 7.1 7.4   ALBUMIN 4.1 4.2   BILITOT 0.9 0.9   ALKPHOS 222* 221*   AST 43* 45*   ALT 53* 54*   ANIONGAP 9 12       Significant Imaging: I have reviewed all pertinent imaging results/findings within the past 24 hours.

## 2024-06-28 NOTE — H&P
Formerly [facility], later [facility] - Emergency Dept  Hospital Medicine  History & Physical    Patient Name: Cyrus Batres Jr.  MRN: [id number]  Patient Class: IP- Inpatient  Admission Date: 6/28/2024  Attending Physician: Taurus Elizalde MD   Primary Care Provider: Maico Patterson MD         Patient information was obtained from patient, past medical records, and ER records.     Subjective:     Principal Problem:Hypocalcemia    Chief Complaint:   Chief Complaint   Patient presents with    abnormal labs     States low calcium        HPI: 73 year old pt getting admitted with worsening hypocalcemia  Pt has Chronic hypocalcemia and is on high doses of Ca Gluconate tablets  Today Outpatient labs showed very low serum Ca and pt was referred to ER  EKG showed long QT  Pt denied any other symptoms    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 OR;  Service: Ophthalmology;  Laterality: Right;    CATARACT EXTRACTION W/  INTRAOCULAR LENS IMPLANT Left 10/18/2023    Procedure: CEIOL OS npo peg;  Surgeon: Stoney Munoz MD;   Location: [facility] OR;  Service: Ophthalmology;  Laterality: Left;    DIRECT LARYNGOBRONCHOSCOPY N/A 12/27/2021    Procedure: LARYNGOSCOPY, DIRECT, WITH BRONCHOSCOPY;  Surgeon: Flex Espinosa MD;  [facility];  Service: ENT;  Laterality: N/A;    DIRECT LARYNGOSCOPY  11/9/2022    Procedure: LARYNGOSCOPY, DIRECT;  Surgeon: Jesse James MD;  [facility];  Service: ENT;;    DISSECTION OF NECK Bilateral 1/6/2022    Procedure: DISSECTION, NECK;  Surgeon: Jesse James MD;  [facility];  Service: ENT;  Laterality: Bilateral;    DISSECTION OF NECK Bilateral 11/9/2022    Procedure: DISSECTION, NECK;  Surgeon: Jesse James MD;  [facility];  Service: ENT;  Laterality: Bilateral;    ESOPHAGOGASTRODUODENOSCOPY N/A 4/9/2024    Procedure: EGD (ESOPHAGOGASTRODUODENOSCOPY);  Surgeon: Matheus Cooper III, MD;  Location: [facility];  Service: Endoscopy;  Laterality: N/A;    FLAP PROCEDURE Right 1/6/2022    Procedure: CREATION, FREE FLAP;  Surgeon: Alise Hart MD;  [address]  Service: ENT;  Laterality: Right;  Ischemic start 1351  Ischemic stop 1502    FLAP PROCEDURE Left 11/9/2022    Procedure: CREATION, FREE FLAP, ALT;  Surgeon: Alise Hart MD;  [facility];  Service: ENT;  Laterality: Left;    GLOSSECTOMY Bilateral 11/9/2022    Procedure: TOTAL GLOSSECTOMY;  Surgeon: Jesse James MD;  [facility];  Service: ENT;  Laterality: Bilateral;    INSERTION OF TUNNELED CENTRAL VENOUS CATHETER (CVC) WITH SUBCUTANEOUS PORT N/A 6/9/2022    Procedure: [id number]-[id number];  Surgeon: Jesus Viera MD;  Location: [facility] OR;  Service: General;  Laterality: N/A;    INSERTION OF TUNNELED CENTRAL VENOUS CATHETER (CVC) WITH SUBCUTANEOUS PORT Right 1/12/2023    Procedure: [id number];  Surgeon: Abdulaziz Le Jr., MD;  Location: [facility];  Service: General;  Laterality: Right;    LARYNGECTOMY  N/A 1/6/2022    Procedure: LARYNGECTOMY;  Surgeon: Jesse James MD;  Location: [facility] OR [facility];  Service: ENT;  Laterality: N/A;    LARYNGOSCOPY N/A 8/4/2020    Procedure: Suspension microlaryngoscopy with biopsy, possible KTP laser treatment/excision;  Surgeon: Stew Noel MD;  [facility];  Service: ENT;  Laterality: N/A;  Microscope, telescopes, tower, microinstruments, KTP laser, rep conf# [id number] IC 7/28.    LARYNGOSCOPY N/A 3/16/2021    Procedure: Suspension microlaryngoscopy with excision of lesion, possible CO2 laser;  Surgeon: Stew Noel MD;  [facility];  Service: ENT;  Laterality: N/A;  Microscope, telescopes, tower, microinstruments, CO2 laser, rep conf# [id number] IC 3/4.    LARYNGOSCOPY N/A 4/1/2021    Procedure: Suspension microlaryngoscopy with KTP laser excision of lesion;  Surgeon: Stew Noel MD;  [facility];  Service: ENT;  Laterality: N/A;  Microscope, telescopes, tower, microinstruments, 70 degree scope, vocal fold , KTP laser, rep conf# [id number] BC    LARYNGOSCOPY N/A 12/9/2021    Procedure: Suspension microlaryngoscopy with biopsy;  Surgeon: Stew Noel MD;  [facility];  Service: ENT;  Laterality: N/A;  Microscope, telescopes, tower, microinstruments    LARYNGOSCOPY N/A 1/6/2022    Procedure: LARYNGOSCOPY;  Surgeon: Jesse James MD;  [facility];  Service: ENT;  Laterality: N/A;    LARYNGOSCOPY N/A 4/27/2022    Procedure: LARYNGOSCOPY WITH BIOPSY;  Surgeon: Jesse James MD;  Location: [facility] OR [facility] F[facility];  Service: ENT;  Laterality: N/A;    MICROLARYNGOSCOPY N/A 3/17/2020    Procedure: MICROLARYNGOSCOPY;  Surgeon: Jung Xiao MD;  Location: [facility] OR;  Service: ENT;  Laterality: N/A;  Laser Microlaryngoscopy  NEED TO SCHEDULE LASER from [facility] [id number]    PHARYNGECTOMY  11/9/2022    Procedure: TOTAL PHARYNGECTOMY;  Surgeon: Jesse James MD;   [facility];  Service: ENT;;    REIMPLANTATION OF PARATHYROID TISSUE N/A 1/6/2022    Procedure: REIMPLANTATION, PARATHYROID TISSUE;  Surgeon: Jesse James MD;  [facility];  Service: ENT;  Laterality: N/A;    SKIN SPLIT GRAFT Right 11/9/2022    Procedure: APPLICATION, GRAFT, SKIN, SPLIT-THICKNESS;  Surgeon: Jesse James MD;  Location: [facility] OR [facility];  Service: ENT;  Laterality: Right;    THYROIDECTOMY  1/6/2022    Procedure: THYROIDECTOMY;  Surgeon: Jesse James MD;  Location: [facility] OR [facility]LR;  Service: ENT;;    TRACHEOSTOMY N/A 12/27/2021    Procedure: CREATION, TRACHEOSTOMY;  Surgeon: Flex Espinosa MD;  Location: [facility] OR [facility]LR;  Service: ENT;  Laterality: N/A;       Review of patient's allergies indicates:   Allergen Reactions    Lovastatin Itching    Pollen extracts     Lovastatin Rash     Not confirmed but pt skeptical       No current facility-administered medications on file prior to encounter.     Current Outpatient Medications on File Prior to Encounter   Medication Sig    calcium carbonate 500 mg/5 mL (1,250 mg/5 mL) Take 20 mls four times daily with meals and nightly. (Patient taking differently: 40 mLs by Per G Tube route 3 (three) times daily. Take 20 mls four times daily with meals and nightly.)    esomeprazole (NEXIUM) 40 MG capsule 40 mg before breakfast.    guaiFENesin-codeine 100-10 mg/5 ml (TUSSI-ORGANIDIN NR)  mg/5 mL syrup Take 5 mLs by mouth 3 (three) times daily as needed for Cough.    lactose-reduced food with fibr (JEVITY 1.5 TRISHA) 0.06 gram-1.5 kcal/mL Liqd 6 cartons per day via peg.  Flush 60ml before and after each bolus (Patient taking differently: 240 mLs by Per G Tube route 5 (five) times daily. 6 cartons per day via peg.  Flush 60ml before and after each bolus)    acetaminophen (TYLENOL) 325 MG tablet Take 325 mg by mouth every 6 (six) hours as needed for Pain.    diclofenac sodium (VOLTAREN ARTHRITIS PAIN) 1 % Gel Apply 2 g  topically once daily. Apply couple of times a day on the affected arthritis pain. (Patient not taking: Reported on 6/3/2024)    famotidine (PEPCID) 40 mg/5 mL (8 mg/mL) suspension Give 2.5 mLs (20 mg total) by Per G Tube route 2 (two) times daily.    ibuprofen (ADVIL,MOTRIN) 200 MG tablet Take 200 mg by mouth every 6 (six) hours as needed for Pain. (Patient not taking: Reported on 6/3/2024)    levothyroxine (SYNTHROID) 100 MCG tablet 1 tablet (100 mcg total) by Per G Tube route before breakfast.    [DISCONTINUED] aspirin (ECOTRIN) 81 MG EC tablet Take 81 mg by mouth Daily.     Family History       Problem Relation (Age of Onset)    Abnormal EKG Mother    Diabetes Father    Heart disease Father    Hypertension Father          Tobacco Use    Smoking status: Never    Smokeless tobacco: Never   Substance and Sexual Activity    Alcohol use: Not Currently     Comment: occasional    Drug use: No    Sexual activity: Yes     Partners: Female     Review of Systems   Constitutional:  Positive for fatigue. Negative for activity change and appetite change.   HENT:  Negative for congestion and dental problem.    Eyes:  Negative for discharge and itching.   Respiratory:  Negative for shortness of breath.    Cardiovascular:  Negative for chest pain.   Gastrointestinal:  Negative for abdominal distention and abdominal pain.   Endocrine: Negative for cold intolerance.   Genitourinary:  Negative for difficulty urinating and dysuria.   Musculoskeletal:  Negative for arthralgias and back pain.   Skin:  Negative for color change.   Neurological:  Negative for dizziness and facial asymmetry.   Hematological:  Negative for adenopathy.   Psychiatric/Behavioral:  Negative for agitation and behavioral problems.      Objective:     Vital Signs (Most Recent):  Temp: 97.7 °F (36.5 °C) (06/28/24 1231)  Pulse: 64 (06/28/24 1531)  Resp: 16 (06/28/24 1231)  BP: 139/69 (06/28/24 1531)  SpO2: 100 % (06/28/24 1531) Vital Signs (24h Range):  Temp:   [97.7 °F (36.5 °C)] 97.7 °F (36.5 °C)  Pulse:  [56-67] 64  Resp:  [16] 16  SpO2:  [99 %-100 %] 100 %  BP: (122-139)/(66-69) 139/69     Weight: 68.5 kg (151 lb)  Body mass index is 24.37 kg/m².     Physical Exam  Vitals and nursing note reviewed.   Constitutional:       Appearance: He is well-developed.   HENT:      Head: Atraumatic.      Right Ear: External ear normal.      Left Ear: External ear normal.      Nose: Nose normal.      Mouth/Throat:      Mouth: Mucous membranes are moist.   Eyes:      Extraocular Movements: Extraocular movements intact.   Cardiovascular:      Rate and Rhythm: Normal rate.   Pulmonary:      Effort: Pulmonary effort is normal.   Abdominal:      Palpations: Abdomen is soft.   Musculoskeletal:         General: Normal range of motion.      Cervical back: Full passive range of motion without pain and normal range of motion.   Skin:     General: Skin is warm.   Neurological:      Mental Status: He is alert and oriented to person, place, and time.   Psychiatric:         Behavior: Behavior normal.                Significant Labs: All pertinent labs within the past 24 hours have been reviewed.  CBC:   Recent Labs   Lab 06/28/24  1035 06/28/24  1331   WBC 5.30 5.68   HGB 11.5* 11.6*   HCT 34.6* 35.3*    149*     CMP:   Recent Labs   Lab 06/28/24  1035 06/28/24  1331    140   K 4.0 4.2    101   CO2 28 27   * 132*   BUN 25* 25*   CREATININE 1.1 1.1   CALCIUM 6.2* 6.1*   PROT 7.1 7.4   ALBUMIN 4.1 4.2   BILITOT 0.9 0.9   ALKPHOS 222* 221*   AST 43* 45*   ALT 53* 54*   ANIONGAP 9 12       Significant Imaging: I have reviewed all pertinent imaging results/findings within the past 24 hours.    Assessment/Plan:     * Hypocalcemia  Acute on chronic issue  Maintain home dose Ca Gluconate  Check Ionized Ca every 6 hrs  He may need further doses of ca gluconate vs Ca CL pushes  If no improvement then need icu visit for CaCl infusion  Pt totally asymptomatic at the moment     Recent  Labs   Lab 06/28/24  1331   CALCIUM 6.1*       Recent Labs   Lab 06/28/24  1514   CAION 0.79*           S/P percutaneous endoscopic gastrostomy (PEG) tube placement  PEG insitu  Consulted Nutrition/Diet team         Laryngeal cancer  Had dissection Sx in 2022  He was deemed not to be a candidate for any further radiation and did not want to undergo any further surgery as this would necessitate a glossectomy procedure.       Malignant neoplasm of base of tongue  He was deemed not to be a candidate for any further radiation and did not want to undergo any further surgery as this would necessitate a glossectomy procedure.       Benign hypertension  Chronic, controlled. Latest blood pressure and vitals reviewed-     Temp:  [97.7 °F (36.5 °C)]   Pulse:  [56-67]   Resp:  [16]   BP: (122-139)/(66-69)   SpO2:  [99 %-100 %] .   Home meds for hypertension were reviewed and noted below.       While in the hospital, will manage blood pressure as follows; Continue home antihypertensive regimen    Will utilize p.r.n. blood pressure medication only if patient's blood pressure greater than 140/90 and he develops symptoms such as worsening chest pain or shortness of breath.      VTE Risk Mitigation (From admission, onward)           Ordered     enoxaparin injection 30 mg  Daily         06/28/24 1553     IP VTE HIGH RISK PATIENT  Once         06/28/24 1553     Place sequential compression device  Until discontinued         06/28/24 1553                                    Taurus Elizalde MD  Department of Hospital Medicine  [facility] - Emergency Dept

## 2024-06-28 NOTE — ASSESSMENT & PLAN NOTE
Had dissection Sx in 2022  He was deemed not to be a candidate for any further radiation and did not want to undergo any further surgery as this would necessitate a glossectomy procedure.

## 2024-06-28 NOTE — TELEPHONE ENCOUNTER
Critical Calcium of 6.2 called from [facility] at [facility]. Reviewed with SUDARSHAN Patino and per her verbal order, patient to report to the ER. Called and spoke with patient's wife, who verbalized understanding and states she will call him and get him to the ER.

## 2024-06-29 LAB
ALBUMIN SERPL BCP-MCNC: 3.9 G/DL (ref 3.5–5.2)
ALP SERPL-CCNC: 193 U/L (ref 55–135)
ALT SERPL W/O P-5'-P-CCNC: 49 U/L (ref 10–44)
ANION GAP SERPL CALC-SCNC: 9 MMOL/L (ref 8–16)
AST SERPL-CCNC: 37 U/L (ref 10–40)
BASOPHILS # BLD AUTO: 0.04 K/UL (ref 0–0.2)
BASOPHILS NFR BLD: 0.7 % (ref 0–1.9)
BILIRUB SERPL-MCNC: 0.8 MG/DL (ref 0.1–1)
BUN SERPL-MCNC: 23 MG/DL (ref 8–23)
CA-I BLDV-SCNC: 0.84 MMOL/L (ref 1.06–1.42)
CA-I BLDV-SCNC: 0.97 MMOL/L (ref 1.06–1.42)
CA-I BLDV-SCNC: 0.97 MMOL/L (ref 1.06–1.42)
CA-I BLDV-SCNC: 1.06 MMOL/L (ref 1.06–1.42)
CALCIUM SERPL-MCNC: 6.6 MG/DL (ref 8.7–10.5)
CHLORIDE SERPL-SCNC: 104 MMOL/L (ref 95–110)
CO2 SERPL-SCNC: 28 MMOL/L (ref 23–29)
CREAT SERPL-MCNC: 1.1 MG/DL (ref 0.5–1.4)
DIFFERENTIAL METHOD BLD: ABNORMAL
EOSINOPHIL # BLD AUTO: 0.4 K/UL (ref 0–0.5)
EOSINOPHIL NFR BLD: 6.3 % (ref 0–8)
ERYTHROCYTE [DISTWIDTH] IN BLOOD BY AUTOMATED COUNT: 12.8 % (ref 11.5–14.5)
EST. GFR  (NO RACE VARIABLE): >60 ML/MIN/1.73 M^2
ESTIMATED AVG GLUCOSE: 148 MG/DL (ref 68–131)
GLUCOSE SERPL-MCNC: 133 MG/DL (ref 70–110)
GLUCOSE SERPL-MCNC: 145 MG/DL (ref 70–110)
GLUCOSE SERPL-MCNC: 94 MG/DL (ref 70–110)
HBA1C MFR BLD: 6.8 % (ref 4.5–6.2)
HCT VFR BLD AUTO: 32.5 % (ref 40–54)
HGB BLD-MCNC: 10.9 G/DL (ref 14–18)
IMM GRANULOCYTES # BLD AUTO: 0.11 K/UL (ref 0–0.04)
IMM GRANULOCYTES NFR BLD AUTO: 1.9 % (ref 0–0.5)
LYMPHOCYTES # BLD AUTO: 0.8 K/UL (ref 1–4.8)
LYMPHOCYTES NFR BLD: 14.5 % (ref 18–48)
MAGNESIUM SERPL-MCNC: 1.9 MG/DL (ref 1.6–2.6)
MCH RBC QN AUTO: 31 PG (ref 27–31)
MCHC RBC AUTO-ENTMCNC: 33.5 G/DL (ref 32–36)
MCV RBC AUTO: 92 FL (ref 82–98)
MONOCYTES # BLD AUTO: 0.5 K/UL (ref 0.3–1)
MONOCYTES NFR BLD: 7.9 % (ref 4–15)
NEUTROPHILS # BLD AUTO: 3.9 K/UL (ref 1.8–7.7)
NEUTROPHILS NFR BLD: 68.7 % (ref 38–73)
NRBC BLD-RTO: 0 /100 WBC
PLATELET # BLD AUTO: 153 K/UL (ref 150–450)
PMV BLD AUTO: 11.4 FL (ref 9.2–12.9)
POTASSIUM SERPL-SCNC: 3.9 MMOL/L (ref 3.5–5.1)
PROT SERPL-MCNC: 6.8 G/DL (ref 6–8.4)
PTH-INTACT SERPL-MCNC: 12.8 PG/ML (ref 9–77)
RBC # BLD AUTO: 3.52 M/UL (ref 4.6–6.2)
SODIUM SERPL-SCNC: 141 MMOL/L (ref 136–145)
WBC # BLD AUTO: 5.73 K/UL (ref 3.9–12.7)

## 2024-06-29 PROCEDURE — 25000003 PHARM REV CODE 250: Performed by: INTERNAL MEDICINE

## 2024-06-29 PROCEDURE — 25000003 PHARM REV CODE 250: Performed by: HOSPITALIST

## 2024-06-29 PROCEDURE — 80053 COMPREHEN METABOLIC PANEL: CPT | Performed by: INTERNAL MEDICINE

## 2024-06-29 PROCEDURE — 36415 COLL VENOUS BLD VENIPUNCTURE: CPT | Performed by: HOSPITALIST

## 2024-06-29 PROCEDURE — 83735 ASSAY OF MAGNESIUM: CPT | Performed by: INTERNAL MEDICINE

## 2024-06-29 PROCEDURE — 25000003 PHARM REV CODE 250: Mod: JZ,JG | Performed by: STUDENT IN AN ORGANIZED HEALTH CARE EDUCATION/TRAINING PROGRAM

## 2024-06-29 PROCEDURE — 83970 ASSAY OF PARATHORMONE: CPT | Performed by: HOSPITALIST

## 2024-06-29 PROCEDURE — 82330 ASSAY OF CALCIUM: CPT | Mod: 91 | Performed by: HOSPITALIST

## 2024-06-29 PROCEDURE — 63600175 PHARM REV CODE 636 W HCPCS: Performed by: INTERNAL MEDICINE

## 2024-06-29 PROCEDURE — 82330 ASSAY OF CALCIUM: CPT | Mod: 91 | Performed by: INTERNAL MEDICINE

## 2024-06-29 PROCEDURE — 85025 COMPLETE CBC W/AUTO DIFF WBC: CPT | Performed by: INTERNAL MEDICINE

## 2024-06-29 PROCEDURE — 36415 COLL VENOUS BLD VENIPUNCTURE: CPT | Performed by: INTERNAL MEDICINE

## 2024-06-29 PROCEDURE — 83036 HEMOGLOBIN GLYCOSYLATED A1C: CPT | Performed by: INTERNAL MEDICINE

## 2024-06-29 PROCEDURE — 21400001 HC TELEMETRY ROOM

## 2024-06-29 PROCEDURE — 82962 GLUCOSE BLOOD TEST: CPT

## 2024-06-29 RX ORDER — CALCIUM GLUCONATE 20 MG/ML
3 INJECTION, SOLUTION INTRAVENOUS
Status: DISCONTINUED | OUTPATIENT
Start: 2024-06-29 | End: 2024-06-30 | Stop reason: HOSPADM

## 2024-06-29 RX ORDER — CALCIUM GLUCONATE 20 MG/ML
2 INJECTION, SOLUTION INTRAVENOUS
Status: DISCONTINUED | OUTPATIENT
Start: 2024-06-29 | End: 2024-06-30 | Stop reason: HOSPADM

## 2024-06-29 RX ORDER — CALCIUM CARBONATE 200(500)MG
1000 TABLET,CHEWABLE ORAL 3 TIMES DAILY
Status: DISCONTINUED | OUTPATIENT
Start: 2024-06-29 | End: 2024-06-30 | Stop reason: HOSPADM

## 2024-06-29 RX ORDER — CHOLECALCIFEROL (VITAMIN D3) 10(400)/ML
1000 DROPS ORAL DAILY
Status: DISCONTINUED | OUTPATIENT
Start: 2024-06-29 | End: 2024-06-30 | Stop reason: HOSPADM

## 2024-06-29 RX ORDER — CALCIUM GLUCONATE 20 MG/ML
1 INJECTION, SOLUTION INTRAVENOUS
Status: DISCONTINUED | OUTPATIENT
Start: 2024-06-29 | End: 2024-06-30 | Stop reason: HOSPADM

## 2024-06-29 RX ADMIN — MUPIROCIN 1 G: 20 OINTMENT TOPICAL at 08:06

## 2024-06-29 RX ADMIN — LEVOTHYROXINE SODIUM 100 MCG: 0.1 TABLET ORAL at 05:06

## 2024-06-29 RX ADMIN — CALCIUM CARBONATE (ANTACID) CHEW TAB 500 MG 2000 MG: 500 CHEW TAB at 09:06

## 2024-06-29 RX ADMIN — FAMOTIDINE 20 MG: 20 TABLET ORAL at 09:06

## 2024-06-29 RX ADMIN — CALCIUM GLUCONATE 2 G: 20 INJECTION, SOLUTION INTRAVENOUS at 07:06

## 2024-06-29 RX ADMIN — ENOXAPARIN SODIUM 40 MG: 40 INJECTION SUBCUTANEOUS at 05:06

## 2024-06-29 RX ADMIN — Medication 1000 UNITS: at 03:06

## 2024-06-29 RX ADMIN — CALCIUM CARBONATE (ANTACID) CHEW TAB 500 MG 1000 MG: 500 CHEW TAB at 03:06

## 2024-06-29 RX ADMIN — FAMOTIDINE 20 MG: 20 TABLET ORAL at 08:06

## 2024-06-29 RX ADMIN — MUPIROCIN 1 G: 20 OINTMENT TOPICAL at 09:06

## 2024-06-29 RX ADMIN — CALCIUM CARBONATE (ANTACID) CHEW TAB 500 MG 1000 MG: 500 CHEW TAB at 08:06

## 2024-06-29 RX ADMIN — CALCIUM GLUCONATE 3 G: 20 INJECTION, SOLUTION INTRAVENOUS at 06:06

## 2024-06-29 NOTE — PLAN OF CARE
Problem: Adult Inpatient Plan of Care  Goal: Plan of Care Review  Outcome: Progressing  Goal: Patient-Specific Goal (Individualized)  Outcome: Progressing  Goal: Absence of Hospital-Acquired Illness or Injury  Outcome: Progressing  Goal: Optimal Comfort and Wellbeing  Outcome: Progressing  Goal: Readiness for Transition of Care  Outcome: Progressing     Problem: Diabetes Comorbidity  Goal: Blood Glucose Level Within Targeted Range  Outcome: Progressing     Problem: Infection  Goal: Absence of Infection Signs and Symptoms  Outcome: Progressing     Problem: Electrolyte Imbalance  Goal: Electrolyte Balance  Outcome: Progressing     Problem: Electrolyte Imbalance  Goal: Electrolyte Balance  Outcome: Progressing     Problem: Enteral Nutrition  Goal: Absence of Aspiration Signs and Symptoms  Outcome: Progressing  Goal: Safe, Effective Therapy Delivery  Outcome: Progressing  Goal: Feeding Tolerance  Outcome: Progressing

## 2024-06-29 NOTE — PLAN OF CARE
Problem: Adult Inpatient Plan of Care  Goal: Plan of Care Review  Outcome: Progressing  Goal: Patient-Specific Goal (Individualized)  Outcome: Progressing  Goal: Absence of Hospital-Acquired Illness or Injury  Outcome: Progressing  Goal: Readiness for Transition of Care  Outcome: Progressing     Problem: Diabetes Comorbidity  Goal: Blood Glucose Level Within Targeted Range  Outcome: Progressing     Problem: Electrolyte Imbalance  Goal: Electrolyte Balance  Outcome: Progressing

## 2024-06-29 NOTE — PLAN OF CARE
Replaced by [facility]  Initial Discharge Assessment       Primary Care Provider: Maico Patterson MD    Admission Diagnosis: Hypocalcemia [E83.51]    Admission Date: 6/28/2024  Expected Discharge Date:      Assessment completed at bedside with Pt , and all information on FaceSheet confirmed, including demographics, PCP, pharmacy and insurance.  Pt  has not addressed advance directives. He currently lives with his spouse.   His PCP is Maico Patterson MD, and last appointment was in March 2024 .  His preferred pharmacy is [facility] listed below.  He denies any DME/HH/HD/Blood Thinners.  For transportation to appointments, he usually drive self.  At discharge, spouse will provide transportation.   He denies any recent hospitalizations. Plan for discharge is that the Pt will return home.          Transition of Care Barriers: None    Payor: [employer] [facility] MCARE / Plan: [employer] [facility] MEDICARE ADVANTAGE / Product Type: Medicare Advantage /     Extended Emergency Contact Information  Primary Emergency Contact: BatresJanel  Address: [address] [place] [address] [place]  Mobile Phone: [phone]  Relation: Spouse  Preferred language: English   needed? No    Discharge Plan A: Home  Discharge Plan B: Home      [facility]  [address]  [facility] [address]  Phone: [phone] Fax: [phone]    [id number] [place] - [address]  [address] [facility] [address]  Phone: [phone] Fax: [phone]      Initial Assessment (most recent)       Adult Discharge Assessment - 06/29/24 1113          Discharge Assessment    Assessment Type Discharge Planning Assessment     Confirmed/corrected address, phone number and insurance Yes     Confirmed Demographics Correct on Facesheet     Source of Information patient     When was your last doctors appointment? --   Pt stated he last seen the doctor in March  2024    Communicated CHIARA with patient/caregiver No     Reason For Admission Hypocalcemia     People in Home spouse     Facility Arrived From: Home     Do you expect to return to your current living situation? Yes     Do you have help at home or someone to help you manage your care at home? Yes     Who are your caregiver(s) and their phone number(s)? Janel Batres (spouse) [phone]     Prior to hospitilization cognitive status: Alert/Oriented     Current cognitive status: Alert/Oriented     Walking or Climbing Stairs Difficulty no     Dressing/Bathing Difficulty no     Equipment Currently Used at Home blood pressure machine;glucometer;grab bar;shower chair     Readmission within 30 days? No     Patient currently being followed by outpatient case management? No     Do you currently have service(s) that help you manage your care at home? No     Do you take prescription medications? Yes     Do you have prescription coverage? Yes     Coverage Payor: [facility] MEDICARE ADVANTAGE -     Do you have any problems affording any of your prescribed medications? No     Is the patient taking medications as prescribed? yes     Who is going to help you get home at discharge? Janel Batres (spouse) [phone]     How do you get to doctors appointments? car, drives self     Are you on dialysis? No     Do you take coumadin? No     Discharge Plan A Home     Discharge Plan B Home     DME Needed Upon Discharge  none     Discharge Plan discussed with: Patient     Transition of Care Barriers None

## 2024-06-29 NOTE — NURSING
Nurses Note -- 4 Eyes      6/28/2024   10:11 PM      Skin assessed during: Admit      [x] No Altered Skin Integrity Present    [x]Prevention Measures Documented      [] Yes- Altered Skin Integrity Present or Discovered   [] LDA Added if Not in Epic (Describe Wound)   [] New Altered Skin Integrity was Present on Admit and Documented in LDA   [] Wound Image Taken    Wound Care Consulted? No    Attending Nurse:  Shakira Bowers RN/Staff Member:   [id number]

## 2024-06-29 NOTE — CONSULTS
[facility]  Adult Nutrition   Consult Note (Nutrition Support Management)    SUMMARY     Recommendations  1.) Continue NPO status.   2.) Will adjust TF orders to bolus of Glucerna 1.5-6 cans per day with FWF 60mL before and after each bolus to provide 2136 calories, 118g protein and 1980ml FW. Recommend extra 60ml FWF TID to meet estimated fluid needs.    TF to meet 100% EEN and EPN.   3.) Hypocalcemia: provide Tums away from bolus feeds (approx 2 hours after bolus and 1 hour prior to bolus) for optimal absorption.   If Tums provided near bolus feed, Tums likely binding to phosphorus and inhibiting absorption of calcium.   4.) Suggest updated HbA1C as pt with PMHx DM2.     Goals:   1.) Pt to tolerate/meet >75% of enteral nutrition by RD follow up.   2.) HbA1C to be updated by RD follow up.   3.) Calcium levels to return to standard reference range by RD follow up.  Nutrition Goal Status: new  Communication of RD Recs: reviewed with physician; reviewed with nurse    Nutrition Diagnosis PES Statement: Inadequate EN infusion related to inappropriate formula as evidenced by Jevity 1.5-6 cans daily causing hyperglycemia per pt.     Dietitian Rounds Brief  Consult received for enteral nutrition management. Pt admitted with worsening hypocalcemia. Pt asymptomatic. He is NPO at this time. Prior to admit, pt was consuming liquids by mouth, such as a smoothie, juice back in January. He denies able to swallow at this time. Pt reports bolusing Jevity 1.5-5 cans daily. He reports when bolusing 6 cans his BG levels rise. He is agreeable to Glucerna 1.5-6 cans/day. A1C pending at this time. No GI distress. LBM 6/28. Skin: intact per chart. Wt reviewed. UBW 65.9 kg (1/2024), CBW 67.5 kg reflecting wt stability. NFPE completed with mild, age appropriate wasting observed. No malnutriton identified at this time.     Nutrition Related Social Determinants of Health: SDOH: Adequate food in home environment    Malnutrition  "Assessment    Orbital Region (Subcutaneous Fat Loss): mild depletion  Upper Arm Region (Subcutaneous Fat Loss): well nourished  Thoracic and Lumbar Region: well nourished   [place] Region (Muscle Loss): mild depletion  Clavicle Bone Region (Muscle Loss): mild depletion  Clavicle and Acromion Bone Region (Muscle Loss): mild depletion  Scapular Bone Region (Muscle Loss): well nourished  Dorsal Hand (Muscle Loss): well nourished  Patellar Region (Muscle Loss): well nourished  Anterior Thigh Region (Muscle Loss): well nourished  Posterior Calf Region (Muscle Loss): well nourished     Diet order:   Current Diet Order: NPO      Current Nutrition Support Formula Ordered: Jevity 1.5     Evaluation of Received Nutrient/Fluid Intake  Energy Calories Required: not meeting needs  Protein Required: not meeting needs  Fluid Required: meeting needs  Tolerance: other (see comments) (has not bolused TF at this time.)     % Intake of Estimated Energy Needs: 25 - 50 %  % Meal Intake: NPO      Intake/Output Summary (Last 24 hours) at 2024 0800  Last data filed at 2024 0654  Gross per 24 hour   Intake 440 ml   Output --   Net 440 ml      Anthropometrics  Temp: 97.7 °F (36.5 °C)  Height Method: Stated  Height: 5' 6" (167.6 cm)  Height (inches): 66 in  Weight Method: Standard Scale  Weight: 67.5 kg (148 lb 13 oz)  Weight (lb): 148.81 lb  Ideal Body Weight (IBW), Male: 142 lb  % Ideal Body Weight, Male (lb): 104.8 %  BMI (Calculated): 24  BMI Grade: 18.5-24.9 - normal  Usual Body Weight (UBW), k.9 kg (2024)  % Usual Body Weight: 102.64     Estimated/Assessed Needs  Weight Used For Calorie Calculations: 67.5 kg (148 lb 13 oz)  Energy Calorie Requirements (kcal):   Energy Need Method: Kcal/kg (30-35)  Protein Requirements:  (1.2-1.5)  Weight Used For Protein Calculations: 67.5 kg (148 lb 13 oz)  Fluid Requirements (mL):      RDA Method (mL):      Reason for Assessment  Reason For Assessment: " consult, new tube feeding  Diagnosis: other (see comments) (hypocalcemia)  Relevant Medical History: 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, glaucoma, HTN, A Fib, DM2, hypothyroidism, gout, iron deficiency anemia  Nutrition Discharge Planning: TF: Jevity 1.5-6 cans daily + FWF 60mL before and after each feed    Nutrition/Diet History  Spiritual, Cultural Beliefs, Christian Practices, Values that Affect Care: no  Food Allergies: NKFA  Factors Affecting Nutritional Intake: difficulty/impaired swallowing, chewing difficulties/inability to chew food, NPO    Nutrition Risk Screen  Nutrition Risk Screen: tube feeding or parenteral nutrition     MST Score: 0  Have you recently lost weight without trying?: No  Weight loss score: 0  Have you been eating poorly because of a decreased appetite?: No  Appetite score: 0     Weight History:  Wt Readings from Last 10 Encounters:   06/28/24 67.5 kg (148 lb 13 oz)   06/26/24 68.7 kg (151 lb 8 oz)   06/03/24 68.9 kg (151 lb 12.8 oz)   05/20/24 68.5 kg (151 lb)   05/09/24 68.1 kg (150 lb 0.4 oz)   05/07/24 67.9 kg (149 lb 11.1 oz)   04/04/24 68.5 kg (151 lb)   04/04/24 67.9 kg (149 lb 12.8 oz)   04/03/24 68.9 kg (151 lb 14.4 oz)   02/29/24 66.7 kg (147 lb 1.6 oz)      Lab/Procedures/Meds: Pertinent Labs/Meds Reviewed    Medications:Pertinent Medications Reviewed  Scheduled Meds:   calcium carbonate  2,000 mg Per G Tube TID    enoxparin  40 mg Subcutaneous Daily    famotidine  20 mg Oral BID    levothyroxine  100 mcg Per G Tube Before breakfast    mupirocin   Nasal BID     Continuous Infusions:  PRN Meds:.  Current Facility-Administered Medications:     acetaminophen, 650 mg, Oral, Q8H PRN    acetaminophen, 650 mg, Oral, Q4H PRN    aluminum-magnesium hydroxide-simethicone, 30 mL, Oral, QID PRN    calcium gluconate IVPB, 1 g, Intravenous, PRN    calcium gluconate IVPB, 2 g, Intravenous, PRN    calcium gluconate IVPB, 3 g, Intravenous, PRN    dextrose  50%, 12.5 g, Intravenous, PRN    dextrose 50%, 25 g, Intravenous, PRN    glucagon (human recombinant), 1 mg, Intramuscular, PRN    glucose, 16 g, Oral, PRN    glucose, 24 g, Oral, PRN    HYDROcodone-acetaminophen, 1 tablet, Oral, Q6H PRN    insulin aspart U-100, 0-10 Units, Subcutaneous, QID (AC + HS) PRN    magnesium oxide, 800 mg, Oral, PRN    magnesium oxide, 800 mg, Oral, PRN    melatonin, 6 mg, Oral, Nightly PRN    naloxone, 0.02 mg, Intravenous, PRN    ondansetron, 4 mg, Intravenous, Q6H PRN    potassium bicarbonate, 35 mEq, Oral, PRN    potassium bicarbonate, 50 mEq, Oral, PRN    potassium bicarbonate, 60 mEq, Oral, PRN    potassium, sodium phosphates, 2 packet, Oral, PRN    potassium, sodium phosphates, 2 packet, Oral, PRN    potassium, sodium phosphates, 2 packet, Oral, PRN    Labs: Pertinent Labs Reviewed  Clinical Chemistry:  Recent Labs   Lab 06/28/24  1035 06/28/24  1035 06/28/24  1331 06/29/24  0310     --  140 141   K 4.0  --  4.2 3.9     --  101 104   CO2 28  --  27 28   *  --  132* 94   BUN 25*  --  25* 23   CREATININE 1.1  --  1.1 1.1   CALCIUM 6.2*  --  6.1* 6.6*   PROT 7.1  --  7.4 6.8   ALBUMIN 4.1  --  4.2 3.9   BILITOT 0.9  --  0.9 0.8   ALKPHOS 222*  --  221* 193*   AST 43*  --  45* 37   ALT 53*  --  54* 49*   ANIONGAP 9  --  12 9   MG  --    < > 1.9 1.9    < > = values in this interval not displayed.     CBC:   Recent Labs   Lab 06/29/24  0310   WBC 5.73   RBC 3.52*   HGB 10.9*   HCT 32.5*      MCV 92   MCH 31.0   MCHC 33.5     Cardiac Profile:  Recent Labs   Lab 06/28/24  1331   BNP 66     Monitor and Evaluation  Food and Nutrient Intake: enteral nutrition intake  Food and Nutrient Adminstration: enteral and parenteral nutrition administration  Physical Activity and Function: nutrition-related ADLs and IADLs  Anthropometric Measurements: weight, weight change  Biochemical Data, Medical Tests and Procedures: electrolyte and renal panel, gastrointestinal profile,  glucose/endocrine profile  Nutrition-Focused Physical Findings: overall appearance     Nutrition Risk  Level of Risk/Frequency of Follow-up:  (2x/week)     Nutrition Follow-Up  RD Follow-up?: Yes      Daiana Wilson RD 06/29/2024 8:00 AM

## 2024-06-29 NOTE — HOSPITAL COURSE
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  Patient recently started on calcium tablets which is not absorbed for his PEG tube, he would like to switch back to calcium liquid.  Patient is completely asymptomatic.  Patient received IV calcium , and calcium level much improved.  Patient is stable for discharge.  We will add on vitamin D replacement upon discharge.

## 2024-06-29 NOTE — PLAN OF CARE
Problem: Enteral Nutrition  Goal: Absence of Aspiration Signs and Symptoms  Outcome: Progressing  Intervention: Minimize Aspiration Risk  Flowsheets (Taken 6/29/2024 0805)  Head of Bed (HOB) Positioning: HOB at 30-45 degrees  Enteral Feeding Safety:   placement checked   drug-nutrient compatibility checked  Goal: Safe, Effective Therapy Delivery  Outcome: Progressing  Intervention: Prevent Feeding-Related Adverse Events  Flowsheets (Taken 6/29/2024 0805)  Enteral Feeding Safety:   placement checked   drug-nutrient compatibility checked  Goal: Feeding Tolerance  Outcome: Progressing  Intervention: Prevent and Manage Feeding Intolerance  Flowsheets (Taken 6/29/2024 0805)  Nutrition Support Management:   tube feeding initiated   weight trending reviewed   other (see comments)     Recommendations  1.) Continue NPO status.   2.) Will correct TF orders to bolus of Jevity 1.5-6 cans per day with FWF 60mL before and after each bolus to provide 2130 calories, 90g protein and 1800ml FW. Recommend extra 60ml FWF TID to meet estimated fluid needs.               TF to meet 100% EEN and EPN.   3.) Hypocalcemia: provide Tums away from bolus feeds (approx 2 hours after bolus and 1 hour prior to bolus) for optimal absorption.   If Tums provided near bolus feed, Tums likely binding to phosphorus and inhibiting absorption of calcium.   4.) Suggest updated HbA1C as pt with PMHx DM2.     Goals:   1.) Pt to tolerate/meet >75% of enteral nutrition by RD follow up.   2.) HbA1C to be updated by RD follow up.   3.) Calcium levels to return to standard reference range by RD follow up.  Nutrition Goal Status: new  Communication of RD Recs: reviewed with physician

## 2024-06-29 NOTE — SUBJECTIVE & OBJECTIVE
Interval History:   Patient seen and examined at multidisciplinary rounds, previous medical record reviewed by me, patient apparently has chronic hypocalcemia secondary to hypoparathyroidism secondary to previous surgery, has strict NPO with chronic PEG tube feeding, he was on liquid calcium replacement however this was changed to calcium tablets recently.  He is asymptomatic.  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       Review of Systems   Constitutional:  Negative for activity change and fever.   HENT:  Negative for ear discharge, facial swelling and sore throat.    Eyes:  Negative for photophobia, pain and visual disturbance.   Respiratory:  Negative for apnea, cough and shortness of breath.    Cardiovascular:  Negative for chest pain and leg swelling.   Gastrointestinal:  Negative for abdominal pain and blood in stool.   Endocrine: Negative for cold intolerance and heat intolerance.   Musculoskeletal:  Negative for back pain and gait problem.   Skin:  Negative for pallor and rash.   Neurological:  Negative for speech difficulty and headaches.   Psychiatric/Behavioral:  Negative for confusion, hallucinations and suicidal ideas.    All other systems reviewed and are negative.    Objective:     Vital Signs (Most Recent):  Temp: 97.4 °F (36.3 °C) (06/29/24 1126)  Pulse: (!) 54 (06/29/24 1126)  Resp: 18 (06/29/24 1126)  BP: 124/77 (map 84) (06/29/24 1126)  SpO2: 98 % (06/29/24 1126) Vital Signs (24h Range):  Temp:  [97.4 °F (36.3 °C)-98 °F (36.7 °C)] 97.4 °F (36.3 °C)  Pulse:  [54-67] 54  Resp:  [13-18] 18  SpO2:  [97 %-100 %] 98 %  BP: (102-139)/(66-77) 124/77     Weight: 67.5 kg (148 lb 13 oz)  Body mass index is 24.02 kg/m².    Intake/Output Summary (Last 24 hours) at 6/29/2024 1210  Last data filed at 6/29/2024 0945  Gross per 24 hour   Intake 770 ml   Output --   Net 770 ml         Physical Exam  Vitals and nursing note reviewed.   Constitutional:       General: He  is not in acute distress.     Appearance: He is not diaphoretic.   HENT:      Head: Normocephalic.   Eyes:      General: No scleral icterus.        Right eye: No discharge.         Left eye: No discharge.      Conjunctiva/sclera: Conjunctivae normal.   Neck:      Vascular: No JVD.      Comments: Tracheostomy in place  Cardiovascular:      Rate and Rhythm: Normal rate and regular rhythm.      Heart sounds: Normal heart sounds. No murmur heard.     No friction rub.   Pulmonary:      Effort: Pulmonary effort is normal. No respiratory distress.      Breath sounds: Normal breath sounds. No wheezing.   Abdominal:      General: Bowel sounds are normal. There is no distension.      Palpations: Abdomen is soft.      Tenderness: There is no abdominal tenderness.      Comments: PEG tube in place   Musculoskeletal:         General: Normal range of motion.   Lymphadenopathy:      Cervical: No cervical adenopathy.   Skin:     Findings: No erythema or rash.   Neurological:      Mental Status: He is alert and oriented to person, place, and time.      Deep Tendon Reflexes: Reflexes are normal and symmetric.   Psychiatric:         Behavior: Behavior normal.             Significant Labs: All pertinent labs within the past 24 hours have been reviewed.  CBC:   Recent Labs   Lab 06/28/24  1035 06/28/24  1331 06/29/24  0310   WBC 5.30 5.68 5.73   HGB 11.5* 11.6* 10.9*   HCT 34.6* 35.3* 32.5*    149* 153     CMP:   Recent Labs   Lab 06/28/24  1035 06/28/24  1331 06/29/24  0310    140 141   K 4.0 4.2 3.9    101 104   CO2 28 27 28   * 132* 94   BUN 25* 25* 23   CREATININE 1.1 1.1 1.1   CALCIUM 6.2* 6.1* 6.6*   PROT 7.1 7.4 6.8   ALBUMIN 4.1 4.2 3.9   BILITOT 0.9 0.9 0.8   ALKPHOS 222* 221* 193*   AST 43* 45* 37   ALT 53* 54* 49*   ANIONGAP 9 12 9       Significant Imaging: I have reviewed all pertinent imaging results/findings within the past 24 hours.  I have reviewed and interpreted all pertinent imaging  results/findings within the past 24 hours.    X-Ray Chest AP Portable    Result Date: 6/28/2024  EXAMINATION: XR CHEST AP PORTABLE CLINICAL HISTORY: Chest Pain; COMPARISON: 03/20/2023 FINDINGS: Cardiac silhouette size is within normal limits.  Right-sided Port-A-Cath is in stable position. No confluent airspace disease or pulmonary edema.  No large pleural effusion or pneumothorax.  No acute osseous abnormality.     No acute pulmonary process. Electronically signed by: Jose De Jesus Oleary Date:    06/28/2024 Time:    13:24  - pulls last radiology orders

## 2024-06-29 NOTE — PROGRESS NOTES
[facility] Medicine  Progress Note    Patient Name: Cyrus Batres Jr.  MRN: [id number]  Patient Class: IP- Inpatient   Admission Date: 6/28/2024  Length of Stay: 1 days  Attending Physician: Saul Montano MD  Primary Care Provider: Maico Patterson MD        Subjective:     Principal Problem:Hypocalcemia        HPI:  73 year old pt getting admitted with worsening hypocalcemia  Pt has Chronic hypocalcemia and is on high doses of Ca Gluconate tablets  Today Outpatient labs showed very low serum Ca and pt was referred to ER  EKG showed long QT  Pt denied any other symptoms    Overview/Hospital Course:  73-year-old  male with history of hypertension, hypothyroidism, diabetes type 2, GERD , laryngeal cancer and malignant neoplasm of base of tongue  who underwent a total glossectomy pharyngectomy with lateral thigh reconstruction and skin graft on 11/09/2022 at [facility] [place] , chronic hypocalcemia secondary to hypoparathyroidism, chronic dysphagia with strict PEG tube feeding presenting here for severe hypocalcemia.  Patient recently started on calcium tablets which is not absorbed for his PEG tube, he would like to switch back to calcium liquid.  Patient is completely asymptomatic    Interval History:   Patient seen and examined at multidisciplinary rounds, previous medical record reviewed by me, patient apparently has chronic hypocalcemia secondary to hypoparathyroidism secondary to previous surgery, has strict NPO with chronic PEG tube feeding, he was on liquid calcium replacement however this was changed to calcium tablets recently.  He is asymptomatic.  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       Review of Systems   Constitutional:  Negative for activity change and fever.   HENT:  Negative for ear discharge, facial swelling and sore throat.    Eyes:  Negative for photophobia, pain and visual disturbance.   Respiratory:  Negative for apnea, cough and  shortness of breath.    Cardiovascular:  Negative for chest pain and leg swelling.   Gastrointestinal:  Negative for abdominal pain and blood in stool.   Endocrine: Negative for cold intolerance and heat intolerance.   Musculoskeletal:  Negative for back pain and gait problem.   Skin:  Negative for pallor and rash.   Neurological:  Negative for speech difficulty and headaches.   Psychiatric/Behavioral:  Negative for confusion, hallucinations and suicidal ideas.    All other systems reviewed and are negative.    Objective:     Vital Signs (Most Recent):  Temp: 97.4 °F (36.3 °C) (06/29/24 1126)  Pulse: (!) 54 (06/29/24 1126)  Resp: 18 (06/29/24 1126)  BP: 124/77 (map 84) (06/29/24 1126)  SpO2: 98 % (06/29/24 1126) Vital Signs (24h Range):  Temp:  [97.4 °F (36.3 °C)-98 °F (36.7 °C)] 97.4 °F (36.3 °C)  Pulse:  [54-67] 54  Resp:  [13-18] 18  SpO2:  [97 %-100 %] 98 %  BP: (102-139)/(66-77) 124/77     Weight: 67.5 kg (148 lb 13 oz)  Body mass index is 24.02 kg/m².    Intake/Output Summary (Last 24 hours) at 6/29/2024 1210  Last data filed at 6/29/2024 0945  Gross per 24 hour   Intake 770 ml   Output --   Net 770 ml         Physical Exam  Vitals and nursing note reviewed.   Constitutional:       General: He is not in acute distress.     Appearance: He is not diaphoretic.   HENT:      Head: Normocephalic.   Eyes:      General: No scleral icterus.        Right eye: No discharge.         Left eye: No discharge.      Conjunctiva/sclera: Conjunctivae normal.   Neck:      Vascular: No JVD.      Comments: Tracheostomy in place  Cardiovascular:      Rate and Rhythm: Normal rate and regular rhythm.      Heart sounds: Normal heart sounds. No murmur heard.     No friction rub.   Pulmonary:      Effort: Pulmonary effort is normal. No respiratory distress.      Breath sounds: Normal breath sounds. No wheezing.   Abdominal:      General: Bowel sounds are normal. There is no distension.      Palpations: Abdomen is soft.      Tenderness:  There is no abdominal tenderness.      Comments: PEG tube in place   Musculoskeletal:         General: Normal range of motion.   Lymphadenopathy:      Cervical: No cervical adenopathy.   Skin:     Findings: No erythema or rash.   Neurological:      Mental Status: He is alert and oriented to person, place, and time.      Deep Tendon Reflexes: Reflexes are normal and symmetric.   Psychiatric:         Behavior: Behavior normal.             Significant Labs: All pertinent labs within the past 24 hours have been reviewed.  CBC:   Recent Labs   Lab 06/28/24  1035 06/28/24  1331 06/29/24  0310   WBC 5.30 5.68 5.73   HGB 11.5* 11.6* 10.9*   HCT 34.6* 35.3* 32.5*    149* 153     CMP:   Recent Labs   Lab 06/28/24  1035 06/28/24  1331 06/29/24  0310    140 141   K 4.0 4.2 3.9    101 104   CO2 28 27 28   * 132* 94   BUN 25* 25* 23   CREATININE 1.1 1.1 1.1   CALCIUM 6.2* 6.1* 6.6*   PROT 7.1 7.4 6.8   ALBUMIN 4.1 4.2 3.9   BILITOT 0.9 0.9 0.8   ALKPHOS 222* 221* 193*   AST 43* 45* 37   ALT 53* 54* 49*   ANIONGAP 9 12 9       Significant Imaging: I have reviewed all pertinent imaging results/findings within the past 24 hours.  I have reviewed and interpreted all pertinent imaging results/findings within the past 24 hours.    X-Ray Chest AP Portable    Result Date: 6/28/2024  EXAMINATION: XR CHEST AP PORTABLE CLINICAL HISTORY: Chest Pain; COMPARISON: 03/20/2023 FINDINGS: Cardiac silhouette size is within normal limits.  Right-sided Port-A-Cath is in stable position. No confluent airspace disease or pulmonary edema.  No large pleural effusion or pneumothorax.  No acute osseous abnormality.     No acute pulmonary process. Electronically signed by: Jose De Jesus Oleary Date:    06/28/2024 Time:    13:24  - pulls last radiology orders      Assessment/Plan:      * Hypocalcemia  Secondary to chronic hypoparathyroidism secondary to previous surgery.  Patient is asymptomatic.  Continue aggressive IV treatment with  calcium gluconate  Restart calcium carbonate 500 mg/5 mL (1,250 mg/5 mL)  q.i.d.  Check vitamin-D level, check PTH level    May be a candidate for PTH replacement, this can be done outpatient.       Recent Labs   Lab 06/29/24  0310   CALCIUM 6.6*         Recent Labs   Lab 06/29/24  1003   CAION 0.97*             S/P percutaneous endoscopic gastrostomy (PEG) tube placement  PEG insitu  Consulted Nutrition/Diet team         Laryngeal cancer  Had dissection Sx in 2022  He was deemed not to be a candidate for any further radiation and did not want to undergo any further surgery as this would necessitate a glossectomy procedure.       Malignant neoplasm of base of tongue  He was deemed not to be a candidate for any further radiation and did not want to undergo any further surgery as this would necessitate a glossectomy procedure.       Type 2 diabetes mellitus with diabetic arthropathy, with long-term current use of insulin        Benign hypertension  Chronic, controlled. Latest blood pressure and vitals reviewed-     Temp:  [97.7 °F (36.5 °C)]   Pulse:  [56-67]   Resp:  [16]   BP: (122-139)/(66-69)   SpO2:  [99 %-100 %] .   Home meds for hypertension were reviewed and noted below.       While in the hospital, will manage blood pressure as follows; Continue home antihypertensive regimen    Will utilize p.r.n. blood pressure medication only if patient's blood pressure greater than 140/90 and he develops symptoms such as worsening chest pain or shortness of breath.      VTE Risk Mitigation (From admission, onward)           Ordered     enoxaparin injection 40 mg  Daily         06/28/24 1709     IP VTE HIGH RISK PATIENT  Once         06/28/24 1553     Place sequential compression device  Until discontinued         06/28/24 1553                    Discharge Planning   CHIARA:      Code Status: Full Code   Is the patient medically ready for discharge?:     Reason for patient still in hospital (select all that apply): Patient  trending condition and Treatment  Discharge Plan A: Home                  Saul Montano MD  Department of Hospital Medicine   [facility]

## 2024-06-29 NOTE — ASSESSMENT & PLAN NOTE
Secondary to chronic hypoparathyroidism secondary to previous surgery.  Patient is asymptomatic.  Continue aggressive IV treatment with calcium gluconate  Restart calcium carbonate 500 mg/5 mL (1,250 mg/5 mL)  q.i.d.  Check vitamin-D level, check PTH level    May be a candidate for PTH replacement, this can be done outpatient.       Recent Labs   Lab 06/29/24  0310   CALCIUM 6.6*         Recent Labs   Lab 06/29/24  1003   CAION 0.97*

## 2024-06-29 NOTE — NURSING
Ionized calcium 0.97 at 1003. Patient currently infusing 3g calcium gluconate from previous lab results. Scheduled calcium given as ordered. Q8 hour ionized calcium continue.

## 2024-06-30 VITALS
TEMPERATURE: 97 F | RESPIRATION RATE: 20 BRPM | BODY MASS INDEX: 23.77 KG/M2 | WEIGHT: 147.94 LBS | SYSTOLIC BLOOD PRESSURE: 130 MMHG | DIASTOLIC BLOOD PRESSURE: 66 MMHG | HEIGHT: 66 IN | OXYGEN SATURATION: 96 % | HEART RATE: 52 BPM

## 2024-06-30 LAB
ALBUMIN SERPL BCP-MCNC: 3.8 G/DL (ref 3.5–5.2)
ALP SERPL-CCNC: 197 U/L (ref 55–135)
ALT SERPL W/O P-5'-P-CCNC: 46 U/L (ref 10–44)
ANION GAP SERPL CALC-SCNC: 8 MMOL/L (ref 8–16)
AST SERPL-CCNC: 35 U/L (ref 10–40)
BILIRUB SERPL-MCNC: 0.9 MG/DL (ref 0.1–1)
BUN SERPL-MCNC: 21 MG/DL (ref 8–23)
CA-I BLDV-SCNC: 1.03 MMOL/L (ref 1.06–1.42)
CA-I BLDV-SCNC: 1.09 MMOL/L (ref 1.06–1.42)
CALCIUM SERPL-MCNC: 8.1 MG/DL (ref 8.7–10.5)
CHLORIDE SERPL-SCNC: 104 MMOL/L (ref 95–110)
CO2 SERPL-SCNC: 27 MMOL/L (ref 23–29)
CREAT SERPL-MCNC: 1.1 MG/DL (ref 0.5–1.4)
EST. GFR  (NO RACE VARIABLE): >60 ML/MIN/1.73 M^2
GLUCOSE SERPL-MCNC: 112 MG/DL (ref 70–110)
GLUCOSE SERPL-MCNC: 117 MG/DL (ref 70–110)
GLUCOSE SERPL-MCNC: 157 MG/DL (ref 70–110)
MAGNESIUM SERPL-MCNC: 1.8 MG/DL (ref 1.6–2.6)
POTASSIUM SERPL-SCNC: 4.3 MMOL/L (ref 3.5–5.1)
PROT SERPL-MCNC: 6.7 G/DL (ref 6–8.4)
SODIUM SERPL-SCNC: 139 MMOL/L (ref 136–145)

## 2024-06-30 PROCEDURE — 25000003 PHARM REV CODE 250: Mod: JZ,JG | Performed by: HOSPITALIST

## 2024-06-30 PROCEDURE — 83735 ASSAY OF MAGNESIUM: CPT | Performed by: INTERNAL MEDICINE

## 2024-06-30 PROCEDURE — 36415 COLL VENOUS BLD VENIPUNCTURE: CPT | Performed by: INTERNAL MEDICINE

## 2024-06-30 PROCEDURE — 80053 COMPREHEN METABOLIC PANEL: CPT | Performed by: INTERNAL MEDICINE

## 2024-06-30 PROCEDURE — 36415 COLL VENOUS BLD VENIPUNCTURE: CPT | Performed by: HOSPITALIST

## 2024-06-30 PROCEDURE — 25000003 PHARM REV CODE 250: Performed by: INTERNAL MEDICINE

## 2024-06-30 PROCEDURE — 82330 ASSAY OF CALCIUM: CPT | Performed by: HOSPITALIST

## 2024-06-30 RX ORDER — CALCIUM CARBONATE 1250 MG/5ML
SUSPENSION ORAL
Qty: 2300 ML | Refills: 12 | Status: SHIPPED | OUTPATIENT
Start: 2024-06-30

## 2024-06-30 RX ORDER — CALCIUM GLUCONATE 20 MG/ML
1 INJECTION, SOLUTION INTRAVENOUS
Status: DISPENSED | OUTPATIENT
Start: 2024-06-30 | End: 2024-06-30

## 2024-06-30 RX ORDER — CHOLECALCIFEROL (VITAMIN D3) 10(400)/ML
1000 DROPS ORAL DAILY
Qty: 75 ML | Refills: 2 | Status: SHIPPED | OUTPATIENT
Start: 2024-07-01 | End: 2024-09-29

## 2024-06-30 RX ADMIN — LEVOTHYROXINE SODIUM 100 MCG: 0.1 TABLET ORAL at 06:06

## 2024-06-30 RX ADMIN — Medication 1000 UNITS: at 10:06

## 2024-06-30 RX ADMIN — CALCIUM GLUCONATE 1 G: 20 INJECTION, SOLUTION INTRAVENOUS at 10:06

## 2024-06-30 NOTE — DISCHARGE SUMMARY
[facility]  Discharge Summary      Patient Name: Cyrus Batres Jr.  MRN: [id number]  JAMIE: [id number]  Patient Class: IP- Inpatient  Admission Date: 6/28/2024  Hospital Length of Stay: 2 days  Discharge Date and Time: 6/30/2024  2:10 PM  Attending Physician: No att. providers found   Discharging Provider: Saul Montano MD  Primary Care Provider: Maico Patterson MD    Primary Care Team: Networked reference to record PCT     HPI:   73 year old pt getting admitted with worsening hypocalcemia  Pt has Chronic hypocalcemia and is on high doses of Ca Gluconate tablets  Today Outpatient labs showed very low serum Ca and pt was referred to ER  EKG showed long QT  Pt denied any other symptoms    * No surgery found *      Hospital Course:   73-year-old  male with history of hypertension, hypothyroidism, diabetes type 2, GERD , laryngeal cancer and malignant neoplasm of base of tongue  who underwent a total glossectomy pharyngectomy with lateral thigh reconstruction and skin graft on 11/09/2022 at [facility] , chronic hypocalcemia secondary to hypoparathyroidism, chronic dysphagia with strict PEG tube feeding presenting here for severe hypocalcemia.  Patient recently started on calcium tablets which is not absorbed for his PEG tube, he would like to switch back to calcium liquid.  Patient is completely asymptomatic.  Patient received IV calcium , and calcium level much improved.  Patient is stable for discharge.  We will add on vitamin D replacement upon discharge.      Goals of Care Treatment Preferences:  Code Status: Full Code       LaPOST: Yes           Consults:   Consults (From admission, onward)          Status Ordering Provider     Inpatient consult to Registered Dietitian/Nutritionist  Once        Provider:  (Not yet assigned)    LUCY Ram            Cardiac/Vascular  Benign hypertension  Chronic, controlled. Latest blood pressure and vitals reviewed-     Temp:  [96.8 °F (36  °C)-98.4 °F (36.9 °C)]   Pulse:  [47-57]   Resp:  [18-20]   BP: (112-130)/(66-75)   SpO2:  [96 %-98 %] .   Home meds for hypertension were reviewed and noted below.       While in the hospital, will manage blood pressure as follows; Continue home antihypertensive regimen    Will utilize p.r.n. blood pressure medication only if patient's blood pressure greater than 140/90 and he develops symptoms such as worsening chest pain or shortness of breath.    Renal/  * Hypocalcemia  Secondary to chronic hypoparathyroidism secondary to previous surgery.  Patient is asymptomatic.  Continue aggressive IV treatment with calcium gluconate  Restart calcium carbonate 500 mg/5 mL (1,250 mg/5 mL)  q.i.d.  Check vitamin-D level, check PTH level    May be a candidate for PTH replacement, this can be done outpatient.       Recent Labs   Lab 06/30/24  0547   CALCIUM 8.1*         Recent Labs   Lab 06/30/24  1144   CAION 1.09             Oncology  Laryngeal cancer  Had dissection Sx in 2022  He was deemed not to be a candidate for any further radiation and did not want to undergo any further surgery as this would necessitate a glossectomy procedure.       Malignant neoplasm of base of tongue  He was deemed not to be a candidate for any further radiation and did not want to undergo any further surgery as this would necessitate a glossectomy procedure.       Endocrine  Type 2 diabetes mellitus with diabetic arthropathy, with long-term current use of insulin        GI  S/P percutaneous endoscopic gastrostomy (PEG) tube placement  PEG insitu  Consulted Nutrition/Diet team           Final Active Diagnoses:    Diagnosis Date Noted POA    PRINCIPAL PROBLEM:  Hypocalcemia [E83.51] 01/12/2022 Yes    S/P percutaneous endoscopic gastrostomy (PEG) tube placement [Z93.1] 06/28/2024 Not Applicable    Laryngeal cancer [C32.9] 11/22/2022 Yes    Malignant neoplasm of base of tongue [C01] 06/22/2022 Yes    Type 2 diabetes mellitus with diabetic  arthropathy, with long-term current use of insulin [E11.618, Z79.4] 07/03/2017 Not Applicable     Chronic    Benign hypertension [I10] 06/08/2016 Yes     Chronic      Problems Resolved During this Admission:       Discharged Condition: stable    Disposition: Home or Self Care    Follow Up:   Follow-up Information       Maico Patterson MD. Schedule an appointment as soon as possible for a visit in 1 week(s).    Specialty: Internal Medicine  Contact information:  [address] [place] [address]  [address] [place] [id number] [phone]                           Patient Instructions:   No discharge procedures on file.    Significant Diagnostic Studies: Labs: CMP   Recent Labs   Lab 06/29/24  0310 06/30/24  0547    139   K 3.9 4.3    104   CO2 28 27   GLU 94 112*   BUN 23 21   CREATININE 1.1 1.1   CALCIUM 6.6* 8.1*   PROT 6.8 6.7   ALBUMIN 3.9 3.8   BILITOT 0.8 0.9   ALKPHOS 193* 197*   AST 37 35   ALT 49* 46*   ANIONGAP 9 8    and CBC   Recent Labs   Lab 06/29/24  0310   WBC 5.73   HGB 10.9*   HCT 32.5*          Pending Diagnostic Studies:       None           Medications:  Reconciled Home Medications:      Medication List        START taking these medications      cholecalciferol (vitamin D3) 10 mcg/mL (400 unit/mL) Drop  Commonly known as: VITAMIN D3  Take 2.5 mLs (1,000 Units total) by mouth once daily.  Start taking on: July 1, 2024     diclofenac sodium 1 % Gel  Commonly known as: VOLTAREN ARTHRITIS PAIN  Apply 2 g topically once daily. Apply couple of times a day on the affected arthritis pain.            CHANGE how you take these medications      calcium carbonate 500 mg/5 mL (1,250 mg/5 mL)  Take 20 mls four times daily with meals and nightly.  What changed:   how much to take  how to take this  when to take this     JEVITY 1.5 TRISHA 0.06 gram-1.5 kcal/mL Liqd  Generic drug: lactose-reduced food with fibr  6 cartons per day via peg.  Flush 60ml before and after each bolus  What changed:   how much to take  how  to take this  when to take this            CONTINUE taking these medications      acetaminophen 325 MG tablet  Commonly known as: TYLENOL  Take 325 mg by mouth every 6 (six) hours as needed for Pain.     esomeprazole 40 MG capsule  Commonly known as: NEXIUM  40 mg before breakfast.     famotidine 40 mg/5 mL (8 mg/mL) suspension  Commonly known as: PEPCID  Give 2.5 mLs (20 mg total) by Per G Tube route 2 (two) times daily.     guaiFENesin-codeine 100-10 mg/5 ml  mg/5 mL syrup  Commonly known as: TUSSI-ORGANIDIN NR  Take 5 mLs by mouth 3 (three) times daily as needed for Cough.     levothyroxine 100 MCG tablet  Commonly known as: SYNTHROID  1 tablet (100 mcg total) by Per G Tube route before breakfast.            STOP taking these medications      ibuprofen 200 MG tablet  Commonly known as: ADVIL,MOTRIN              Indwelling Lines/Drains at time of discharge:   Lines/Drains/Airways       Central Venous Catheter Line  Duration             Port A Cath Single Lumen left subclavian -- days              Drain  Duration                  Gastrostomy/Enterostomy 04/09/24 1056 feeding 82 days              Airway  Duration                  Laryngectomy (Do Not Remove) Laryngectomy stoma -- days         Surgical Airway Shiley Cuffed -- days    Adult Surgical Airway Shiley Cuffed -- days         Airway - Non-Surgical 11/09/22 0856 Coil Wire Tube 599 days                  X-Ray Chest AP Portable    Result Date: 6/28/2024  EXAMINATION: XR CHEST AP PORTABLE CLINICAL HISTORY: Chest Pain; COMPARISON: 03/20/2023 FINDINGS: Cardiac silhouette size is within normal limits.  Right-sided Port-A-Cath is in stable position. No confluent airspace disease or pulmonary edema.  No large pleural effusion or pneumothorax.  No acute osseous abnormality.     No acute pulmonary process. Electronically signed by: Jose De Jesus Oleary Date:    06/28/2024 Time:    13:24  - pulls last radiology orders    Time spent on the discharge of patient: 35  minutes         Saul Montano MD  Department of Hospital Medicine  [facility]

## 2024-06-30 NOTE — PLAN OF CARE
Problem: Adult Inpatient Plan of Care  Goal: Plan of Care Review  Outcome: Met  Goal: Patient-Specific Goal (Individualized)  Outcome: Met  Goal: Absence of Hospital-Acquired Illness or Injury  Outcome: Met  Goal: Optimal Comfort and Wellbeing  Outcome: Met  Goal: Readiness for Transition of Care  Outcome: Met     Problem: Diabetes Comorbidity  Goal: Blood Glucose Level Within Targeted Range  Outcome: Met     Problem: Infection  Goal: Absence of Infection Signs and Symptoms  Outcome: Met     Problem: Electrolyte Imbalance  Goal: Electrolyte Balance  Outcome: Met     Problem: Enteral Nutrition  Goal: Absence of Aspiration Signs and Symptoms  Outcome: Met  Goal: Safe, Effective Therapy Delivery  Outcome: Met  Goal: Feeding Tolerance  Outcome: Met     Problem: Fall Injury Risk  Goal: Absence of Fall and Fall-Related Injury  Outcome: Met

## 2024-06-30 NOTE — ASSESSMENT & PLAN NOTE
Chronic, controlled. Latest blood pressure and vitals reviewed-     Temp:  [96.8 °F (36 °C)-98.4 °F (36.9 °C)]   Pulse:  [47-57]   Resp:  [18-20]   BP: (112-130)/(66-75)   SpO2:  [96 %-98 %] .   Home meds for hypertension were reviewed and noted below.       While in the hospital, will manage blood pressure as follows; Continue home antihypertensive regimen    Will utilize p.r.n. blood pressure medication only if patient's blood pressure greater than 140/90 and he develops symptoms such as worsening chest pain or shortness of breath.

## 2024-06-30 NOTE — PLAN OF CARE
Problem: Adult Inpatient Plan of Care  Goal: Plan of Care Review  Outcome: Progressing  Goal: Patient-Specific Goal (Individualized)  Outcome: Progressing  Goal: Absence of Hospital-Acquired Illness or Injury  Outcome: Progressing  Goal: Optimal Comfort and Wellbeing  Outcome: Progressing  Goal: Readiness for Transition of Care  Outcome: Progressing     Problem: Diabetes Comorbidity  Goal: Blood Glucose Level Within Targeted Range  Outcome: Progressing     Problem: Infection  Goal: Absence of Infection Signs and Symptoms  Outcome: Progressing     Problem: Electrolyte Imbalance  Goal: Electrolyte Balance  Outcome: Progressing     Problem: Enteral Nutrition  Goal: Absence of Aspiration Signs and Symptoms  Outcome: Progressing  Goal: Safe, Effective Therapy Delivery  Outcome: Progressing  Goal: Feeding Tolerance  Outcome: Progressing     Problem: Fall Injury Risk  Goal: Absence of Fall and Fall-Related Injury  Outcome: Progressing

## 2024-06-30 NOTE — PLAN OF CARE
Patient cleared for discharge from case management standpoint.    Follow up appointments scheduled and added to AVS.    Chart and discharge orders reviewed.  Patient discharged home with no further case management needs.       06/30/24 1345   Final Note   Assessment Type Final Discharge Note   Anticipated Discharge Disposition Home   What phone number can be called within the next 1-3 days to see how you are doing after discharge? [phone]   Post-Acute Status   Discharge Delays None known at this time

## 2024-06-30 NOTE — ASSESSMENT & PLAN NOTE
Secondary to chronic hypoparathyroidism secondary to previous surgery.  Patient is asymptomatic.  Continue aggressive IV treatment with calcium gluconate  Restart calcium carbonate 500 mg/5 mL (1,250 mg/5 mL)  q.i.d.  Check vitamin-D level, check PTH level    May be a candidate for PTH replacement, this can be done outpatient.       Recent Labs   Lab 06/30/24  0547   CALCIUM 8.1*         Recent Labs   Lab 06/30/24  1144   CAION 1.09

## 2024-06-30 NOTE — NURSING
1410- Discharge instructions reviewed with pt. Pt nodded that understands discharge instructions. All questions answered. Tele monitor off and IV removed. Pt discharged.

## 2024-07-01 DIAGNOSIS — R05.9 COUGH, UNSPECIFIED TYPE: ICD-10-CM

## 2024-07-01 LAB
OHS QRS DURATION: 78 MS
OHS QTC CALCULATION: 464 MS

## 2024-07-02 RX ORDER — CODEINE PHOSPHATE AND GUAIFENESIN 10; 100 MG/5ML; MG/5ML
5 SOLUTION ORAL 3 TIMES DAILY PRN
Qty: 237 ML | Refills: 0 | OUTPATIENT
Start: 2024-07-02

## 2024-07-05 ENCOUNTER — PATIENT OUTREACH (OUTPATIENT)
Dept: ADMINISTRATIVE | Facility: CLINIC | Age: 74
End: 2024-07-05
Payer: MEDICARE

## 2024-07-05 NOTE — PROGRESS NOTES
C3 nurse attempted to contact Cyrus Batres Jr.'s wife Janel for a TCC post hospital discharge follow up call. The patient is unable to conduct the call @ this time. The patient requested a callback.    The patient does not have a scheduled HOSFU appointment within 5-7 days post hospital discharge date 06/3024. Message sent to Physician staff to assist with HOSFU appointment scheduling.

## 2024-07-05 NOTE — PROGRESS NOTES
C3 nurse spoke with Cyrus Batres 's wife Janel for a TCC post hospital discharge follow up call. The patient has a scheduled HOSFU appointment with Maico Patterson on 07/08/24 @ 1500.

## 2024-07-08 ENCOUNTER — PATIENT MESSAGE (OUTPATIENT)
Facility: CLINIC | Age: 74
End: 2024-07-08
Payer: MEDICARE

## 2024-07-08 ENCOUNTER — OFFICE VISIT (OUTPATIENT)
Dept: FAMILY MEDICINE | Facility: CLINIC | Age: 74
End: 2024-07-08
Payer: MEDICARE

## 2024-07-08 VITALS
WEIGHT: 148 LBS | HEIGHT: 66 IN | DIASTOLIC BLOOD PRESSURE: 69 MMHG | SYSTOLIC BLOOD PRESSURE: 111 MMHG | BODY MASS INDEX: 23.78 KG/M2 | HEART RATE: 63 BPM

## 2024-07-08 DIAGNOSIS — I70.0 AORTIC ATHEROSCLEROSIS: ICD-10-CM

## 2024-07-08 DIAGNOSIS — C32.9 LARYNX CANCER: ICD-10-CM

## 2024-07-08 DIAGNOSIS — Z09 HOSPITAL DISCHARGE FOLLOW-UP: ICD-10-CM

## 2024-07-08 DIAGNOSIS — E89.2 POSTPROCEDURAL HYPOPARATHYROIDISM: ICD-10-CM

## 2024-07-08 DIAGNOSIS — C79.89 SECONDARY MALIGNANT NEOPLASM OF OTHER SPECIFIED SITES: ICD-10-CM

## 2024-07-08 DIAGNOSIS — R05.9 COUGH, UNSPECIFIED TYPE: Primary | ICD-10-CM

## 2024-07-08 DIAGNOSIS — J34.89 RHINORRHEA: ICD-10-CM

## 2024-07-08 PROBLEM — I47.29 NSVT (NONSUSTAINED VENTRICULAR TACHYCARDIA): Chronic | Status: RESOLVED | Noted: 2021-01-13 | Resolved: 2024-07-08

## 2024-07-08 PROBLEM — E44.1 MALNUTRITION OF MILD DEGREE: Status: RESOLVED | Noted: 2022-11-16 | Resolved: 2024-07-08

## 2024-07-08 PROBLEM — G62.0 CHEMOTHERAPY-INDUCED PERIPHERAL NEUROPATHY: Status: RESOLVED | Noted: 2023-08-23 | Resolved: 2024-07-08

## 2024-07-08 PROBLEM — T45.1X5A CHEMOTHERAPY-INDUCED PERIPHERAL NEUROPATHY: Status: RESOLVED | Noted: 2023-08-23 | Resolved: 2024-07-08

## 2024-07-08 PROBLEM — I48.0 PAROXYSMAL ATRIAL FIBRILLATION: Chronic | Status: RESOLVED | Noted: 2017-07-03 | Resolved: 2024-07-08

## 2024-07-08 PROBLEM — E46 PROTEIN MALNUTRITION: Status: RESOLVED | Noted: 2022-11-23 | Resolved: 2024-07-08

## 2024-07-08 PROCEDURE — 3074F SYST BP LT 130 MM HG: CPT | Mod: CPTII,S$GLB,, | Performed by: INTERNAL MEDICINE

## 2024-07-08 PROCEDURE — 1159F MED LIST DOCD IN RCRD: CPT | Mod: CPTII,S$GLB,, | Performed by: INTERNAL MEDICINE

## 2024-07-08 PROCEDURE — 99213 OFFICE O/P EST LOW 20 MIN: CPT | Mod: S$GLB,,, | Performed by: INTERNAL MEDICINE

## 2024-07-08 PROCEDURE — 1101F PT FALLS ASSESS-DOCD LE1/YR: CPT | Mod: CPTII,S$GLB,, | Performed by: INTERNAL MEDICINE

## 2024-07-08 PROCEDURE — 3008F BODY MASS INDEX DOCD: CPT | Mod: CPTII,S$GLB,, | Performed by: INTERNAL MEDICINE

## 2024-07-08 PROCEDURE — 3288F FALL RISK ASSESSMENT DOCD: CPT | Mod: CPTII,S$GLB,, | Performed by: INTERNAL MEDICINE

## 2024-07-08 PROCEDURE — 99999 PR PBB SHADOW E&M-EST. PATIENT-LVL III: CPT | Mod: PBBFAC,,, | Performed by: INTERNAL MEDICINE

## 2024-07-08 PROCEDURE — 1160F RVW MEDS BY RX/DR IN RCRD: CPT | Mod: CPTII,S$GLB,, | Performed by: INTERNAL MEDICINE

## 2024-07-08 PROCEDURE — 1126F AMNT PAIN NOTED NONE PRSNT: CPT | Mod: CPTII,S$GLB,, | Performed by: INTERNAL MEDICINE

## 2024-07-08 PROCEDURE — 1157F ADVNC CARE PLAN IN RCRD: CPT | Mod: CPTII,S$GLB,, | Performed by: INTERNAL MEDICINE

## 2024-07-08 PROCEDURE — 3072F LOW RISK FOR RETINOPATHY: CPT | Mod: CPTII,S$GLB,, | Performed by: INTERNAL MEDICINE

## 2024-07-08 PROCEDURE — 3044F HG A1C LEVEL LT 7.0%: CPT | Mod: CPTII,S$GLB,, | Performed by: INTERNAL MEDICINE

## 2024-07-08 PROCEDURE — 1111F DSCHRG MED/CURRENT MED MERGE: CPT | Mod: CPTII,S$GLB,, | Performed by: INTERNAL MEDICINE

## 2024-07-08 PROCEDURE — 3078F DIAST BP <80 MM HG: CPT | Mod: CPTII,S$GLB,, | Performed by: INTERNAL MEDICINE

## 2024-07-08 RX ORDER — CODEINE PHOSPHATE AND GUAIFENESIN 10; 100 MG/5ML; MG/5ML
5 SOLUTION ORAL 3 TIMES DAILY PRN
Qty: 237 ML | Refills: 0 | Status: CANCELLED | OUTPATIENT
Start: 2024-07-08

## 2024-07-08 RX ORDER — PROMETHAZINE HYDROCHLORIDE AND DEXTROMETHORPHAN HYDROBROMIDE 6.25; 15 MG/5ML; MG/5ML
5 SYRUP ORAL NIGHTLY PRN
Qty: 120 ML | Refills: 2 | Status: SHIPPED | OUTPATIENT
Start: 2024-07-08 | End: 2024-09-19

## 2024-07-08 NOTE — PROGRESS NOTES
Subjective:       Patient ID: Cyrus Batres Jr. is a 73 y.o. male.    Chief Complaint: Hospital Follow Up, Larynx cancer, and Chest Congestion    Recently hospitalized for what appears to be hypocalcemia and tetany and was given calcium suspension and vitamin-D suspension.      His chronic medical conditions include the following:-  1.-APHONIC SECONDARY TO TRACHEOSTOMY STATUS AND COMMUNICATES THROUGH ELECTRONIC [employer]onic[employer] BOARD.  2. FEEDING VIA PEG TUBE WHICH IS STABLE AT THIS POINT.  Gets Jevity feeding  3.-INSOMNIA WITH CURRENT STABLE DEPENDENCY ON AMBIEN 5 MG.  Also trazodone noted in his list.  4.-HISTORY OF DIARRHEA FOR WHICH HE TAKES OPIUM TINCTURE.  5.-RECENT LABS HAVE SHOWN A SOMEWHAT LOW SODIUM.  CAUSE OF LOW SODIUM UNCERTAIN AND NOT SURE IF HE IS GETTING ENOUGH ELECTROLYTES.  6.-diabetes mellitus at least in past but I suspect secondary to weight loss sugar numbers have got better.  Currently he is not noted to be on any medications.  7.-GI protection with esomeprazole and famotidine.  8.-hypocalcemia currently on calcium carbonate supplementation.  Possibly secondary to weight loss and nutritional issues.        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    Tongue cancer     Tracheostomy dependence     Type 2 diabetes mellitus, without long-term current use of insulin 11/22/2022    Unspecified glaucoma      Social History     Socioeconomic History     Marital status:      Spouse name: Janel Batres    Number of children: 2   Occupational History    Occupation: [employer]     Employer: [employer]   Tobacco Use    Smoking status: Never    Smokeless tobacco: Never   Substance and Sexual Activity    Alcohol use: Not Currently     Comment: occasional    Drug use: No    Sexual activity: Yes     Partners: Female   Social History Narrative    ** Merged History Encounter **         2 children from his 1st wife     Social Determinants of Health     Financial Resource Strain: Low Risk  (2/2/2024)    Overall Financial Resource Strain (CARDIA)     Difficulty of Paying Living Expenses: Not hard at all   Food Insecurity: No Food Insecurity (2/2/2024)    Hunger Vital Sign     Worried About Running Out of Food in the Last Year: Never true     Ran Out of Food in the Last Year: Never true   Transportation Needs: No Transportation Needs (2/2/2024)    PRAPARE - Transportation     Lack of Transportation (Medical): No     Lack of Transportation (Non-Medical): No   Physical Activity: Insufficiently Active (2/2/2024)    Exercise Vital Sign     Days of Exercise per Week: 3 days     Minutes of Exercise per Session: 30 min   Stress: No Stress Concern Present (2/2/2024)    [place] [employer] - Occupational Stress Questionnaire     Feeling of Stress : Not at all   Housing Stability: Low Risk  (2/2/2024)    Housing Stability Vital Sign     Unable to Pay for Housing in the Last Year: No     Number of Places Lived in the Last Year: 1     Unstable Housing in the Last Year: No     Past Surgical History:   Procedure Laterality Date    CATARACT EXTRACTION W/  INTRAOCULAR LENS IMPLANT Right 8/16/2023    Procedure: CEIOL OD  NPO-peg;  Surgeon: Stoney Munoz MD;  Location: [facility];  Service: Ophthalmology;  Laterality: Right;    CATARACT EXTRACTION W/  INTRAOCULAR LENS IMPLANT Left 10/18/2023    Procedure: CEIOL OS npo peg;  Surgeon: Stoney Munoz MD;  Location:  [facility];  Service: Ophthalmology;  Laterality: Left;    DIRECT LARYNGOBRONCHOSCOPY N/A 12/27/2021    Procedure: LARYNGOSCOPY, DIRECT, WITH BRONCHOSCOPY;  Surgeon: Flex Espinosa MD;  [facility];  Service: ENT;  Laterality: N/A;    DIRECT LARYNGOSCOPY  11/9/2022    Procedure: LARYNGOSCOPY, DIRECT;  Surgeon: Jesse James MD;  [facility];  Service: ENT;;    DISSECTION OF NECK Bilateral 1/6/2022    Procedure: DISSECTION, NECK;  Surgeon: Jesse James MD;  [facility];  Service: ENT;  Laterality: Bilateral;    DISSECTION OF NECK Bilateral 11/9/2022    Procedure: DISSECTION, NECK;  Surgeon: Jesse James MD;  [facility];  Service: ENT;  Laterality: Bilateral;    ESOPHAGOGASTRODUODENOSCOPY N/A 4/9/2024    Procedure: EGD (ESOPHAGOGASTRODUODENOSCOPY);  Surgeon: Matheus Cooper III, MD;  Location: [facility];  Service: Endoscopy;  Laterality: N/A;    FLAP PROCEDURE Right 1/6/2022    Procedure: CREATION, FREE FLAP;  Surgeon: Alise Hart MD;  Location: [address];  Service: ENT;  Laterality: Right;  Ischemic start 1351  Ischemic stop 1502    FLAP PROCEDURE Left 11/9/2022    Procedure: CREATION, FREE FLAP, ALT;  Surgeon: Alsie Hart MD;  Location: [facility] OR [address] [place] [address];  Service: ENT;  Laterality: Left;    GLOSSECTOMY Bilateral 11/9/2022    Procedure: TOTAL GLOSSECTOMY;  Surgeon: Jesse James MD;  [facility];  Service: ENT;  Laterality: Bilateral;    INSERTION OF TUNNELED CENTRAL VENOUS CATHETER (CVC) WITH SUBCUTANEOUS PORT N/A 6/9/2022    Procedure: [id number];  Surgeon: Jesus Viera MD;  Location: [facility] OR;  Service: General;  Laterality: N/A;    INSERTION OF TUNNELED CENTRAL VENOUS CATHETER (CVC) WITH SUBCUTANEOUS PORT Right 1/12/2023    Procedure: [id number];  Surgeon: Abdulaziz Le Jr., MD;  Location: [facility];  Service: General;  Laterality: Right;    LARYNGECTOMY N/A 1/6/2022     Procedure: LARYNGECTOMY;  Surgeon: Jesse James MD;  [facility];  Service: ENT;  Laterality: N/A;    LARYNGOSCOPY N/A 8/4/2020    Procedure: Suspension microlaryngoscopy with biopsy, possible KTP laser treatment/excision;  Surgeon: Stew Noel MD;  [facility];  Service: ENT;  Laterality: N/A;  Microscope, telescopes, tower, microinstruments, KTP laser, rep conf# [id number] IC 7/28.    LARYNGOSCOPY N/A 3/16/2021    Procedure: Suspension microlaryngoscopy with excision of lesion, possible CO2 laser;  Surgeon: Stew Noel MD;  Location: [facility] OR [address] [place] [address];  Service: ENT;  Laterality: N/A;  Microscope, telescopes, tower, microinstruments, CO2 laser, rep conf# [id number] IC 3/4.    LARYNGOSCOPY N/A 4/1/2021    Procedure: Suspension microlaryngoscopy with KTP laser excision of lesion;  Surgeon: Stew Noel MD;  [facility];  Service: ENT;  Laterality: N/A;  Microscope, telescopes, tower, microinstruments, 70 degree scope, vocal fold , KTP laser, rep conf# [id number] BC    LARYNGOSCOPY N/A 12/9/2021    Procedure: Suspension microlaryngoscopy with biopsy;  Surgeon: Stew Noel MD;  Location: [facility] OR [address] [place] [address];  Service: ENT;  Laterality: N/A;  Microscope, telescopes, tower, microinstruments    LARYNGOSCOPY N/A 1/6/2022    Procedure: LARYNGOSCOPY;  Surgeon: Jesse James MD;  [facility];  Service: ENT;  Laterality: N/A;    LARYNGOSCOPY N/A 4/27/2022    Procedure: LARYNGOSCOPY WITH BIOPSY;  Surgeon: Jesse James MD;  [facility];  Service: ENT;  Laterality: N/A;    MICROLARYNGOSCOPY N/A 3/17/2020    Procedure: MICROLARYNGOSCOPY;  Surgeon: Jung Xiao MD;  Location: [facility] OR;  Service: ENT;  Laterality: N/A;  Laser Microlaryngoscopy  NEED TO SCHEDULE LASER from [facility] [id number]    PHARYNGECTOMY  11/9/2022    Procedure: TOTAL PHARYNGECTOMY;  Surgeon: Jesse James MD;  Location: [facility]  "OR 2ND FLR;  Service: ENT;;    REIMPLANTATION OF PARATHYROID TISSUE N/A 1/6/2022    Procedure: REIMPLANTATION, PARATHYROID TISSUE;  Surgeon: Jesse James MD;  Location: [facility] OR 2ND FLR;  Service: ENT;  Laterality: N/A;    SKIN SPLIT GRAFT Right 11/9/2022    Procedure: APPLICATION, GRAFT, SKIN, SPLIT-THICKNESS;  Surgeon: Jesse James MD;  Location: [facility] OR 2ND FLR;  Service: ENT;  Laterality: Right;    THYROIDECTOMY  1/6/2022    Procedure: THYROIDECTOMY;  Surgeon: Jesse James MD;  Location: [facility] OR 2ND FLR;  Service: ENT;;    TRACHEOSTOMY N/A 12/27/2021    Procedure: CREATION, TRACHEOSTOMY;  Surgeon: Flex Espinosa MD;  Location: [facility] OR 2ND FLR;  Service: ENT;  Laterality: N/A;     Family History   Problem Relation Name Age of Onset    Abnormal EKG Mother Hayley     Diabetes Father Nicolás     Heart disease Father Nicolás     Hypertension Father Nicolás        Review of Systems      Objective:      Blood pressure 111/69, pulse 63, height 5' 6" (1.676 m), weight 67.1 kg (148 lb). Body mass index is 23.89 kg/m².  Physical Exam  Constitutional:       General: He is not in acute distress.     Appearance: He is ill-appearing. He is not diaphoretic.      Comments: BMI is 23.89-communicates via a electronic tablet plate.   HENT:      Head: Normocephalic.        Comments: Tracheostomy tube noted in the neck.  Fitting well.  No infection.  21.64     Mouth/Throat:        Comments: EXAMINATION OF ORAL CAVITY DIFFICULT BECAUSE OF TIGHT MASSETERS.  TOTAL GLOSSECTOMY STATE.  Eyes:      General: No scleral icterus.  Neck:      Vascular: No carotid bruit.   Cardiovascular:      Rate and Rhythm: Normal rate and regular rhythm.   Pulmonary:      Effort: Pulmonary effort is normal. No respiratory distress.      Breath sounds: Normal breath sounds. No wheezing or rhonchi.   Abdominal:      General: There is no distension.      Palpations: Abdomen is soft. There is no mass.      Tenderness: There is no " abdominal tenderness.          Comments: Peg tube noted.   Musculoskeletal:         General: No swelling or tenderness.      Cervical back: No rigidity or tenderness.      Right lower leg: No edema.      Left lower leg: No edema.   Skin:     Coloration: Skin is not jaundiced or pale.      Findings: No erythema or lesion.   Neurological:      Mental Status: He is alert. Mental status is at baseline.   Psychiatric:         Behavior: Behavior normal.           Assessment:       Admission on 06/28/2024, Discharged on 06/30/2024   Component Date Value Ref Range Status    Magnesium 06/28/2024 1.9  1.6 - 2.6 mg/dL Final    QRS Duration 06/28/2024 78  ms Final    [facility] QTC Calculation 06/28/2024 464  ms Final    WBC 06/28/2024 5.68  3.90 - 12.70 K/uL Final    RBC 06/28/2024 3.72 (L)  4.60 - 6.20 M/uL Final    Hemoglobin 06/28/2024 11.6 (L)  14.0 - 18.0 g/dL Final    Hematocrit 06/28/2024 35.3 (L)  40.0 - 54.0 % Final    MCV 06/28/2024 95  82 - 98 fL Final    MCH 06/28/2024 31.2 (H)  27.0 - 31.0 pg Final    MCHC 06/28/2024 32.9  32.0 - 36.0 g/dL Final    RDW 06/28/2024 12.8  11.5 - 14.5 % Final    Platelets 06/28/2024 149 (L)  150 - 450 K/uL Final    MPV 06/28/2024 11.8  9.2 - 12.9 fL Final    Immature Granulocytes 06/28/2024 1.6 (H)  0.0 - 0.5 % Final    Gran # (ANC) 06/28/2024 4.1  1.8 - 7.7 K/uL Final    Immature Grans (Abs) 06/28/2024 0.09 (H)  0.00 - 0.04 K/uL Final    Lymph # 06/28/2024 0.7 (L)  1.0 - 4.8 K/uL Final    Mono # 06/28/2024 0.5  0.3 - 1.0 K/uL Final    Eos # 06/28/2024 0.3  0.0 - 0.5 K/uL Final    Baso # 06/28/2024 0.05  0.00 - 0.20 K/uL Final    nRBC 06/28/2024 0  0 /100 WBC Final    Gran % 06/28/2024 71.3  38.0 - 73.0 % Final    Lymph % 06/28/2024 12.1 (L)  18.0 - 48.0 % Final    Mono % 06/28/2024 8.8  4.0 - 15.0 % Final    Eosinophil % 06/28/2024 5.3  0.0 - 8.0 % Final    Basophil % 06/28/2024 0.9  0.0 - 1.9 % Final    Differential Method 06/28/2024 Automated   Final    Sodium 06/28/2024 140  136 - 145  mmol/L Final    Potassium 06/28/2024 4.2  3.5 - 5.1 mmol/L Final    Chloride 06/28/2024 101  95 - 110 mmol/L Final    CO2 06/28/2024 27  23 - 29 mmol/L Final    Glucose 06/28/2024 132 (H)  70 - 110 mg/dL Final    BUN 06/28/2024 25 (H)  8 - 23 mg/dL Final    Creatinine 06/28/2024 1.1  0.5 - 1.4 mg/dL Final    Calcium 06/28/2024 6.1 (LL)  8.7 - 10.5 mg/dL Final    Total Protein 06/28/2024 7.4  6.0 - 8.4 g/dL Final    Albumin 06/28/2024 4.2  3.5 - 5.2 g/dL Final    Total Bilirubin 06/28/2024 0.9  0.1 - 1.0 mg/dL Final    Alkaline Phosphatase 06/28/2024 221 (H)  55 - 135 U/L Final    AST 06/28/2024 45 (H)  10 - 40 U/L Final    ALT 06/28/2024 54 (H)  10 - 44 U/L Final    eGFR 06/28/2024 >60.0  >60 mL/min/1.73 m^2 Final    Anion Gap 06/28/2024 12  8 - 16 mmol/L Final    Troponin I High Sensitivity 06/28/2024 7.2  0.0 - 14.9 pg/mL Final    BNP 06/28/2024 66  0 - 99 pg/mL Final    Ionized Calcium 06/28/2024 0.79 (L)  1.06 - 1.42 mmol/L Final    Ionized Calcium 06/28/2024 0.82 (L)  1.06 - 1.42 mmol/L Final    POC Glucose 06/28/2024 301 (H)  70 - 110 Final    Hemoglobin A1C 06/29/2024 6.8 (H)  4.5 - 6.2 % Final    Estimated Avg Glucose 06/29/2024 148 (H)  68 - 131 mg/dL Final    Sodium 06/29/2024 141  136 - 145 mmol/L Final    Potassium 06/29/2024 3.9  3.5 - 5.1 mmol/L Final    Chloride 06/29/2024 104  95 - 110 mmol/L Final    CO2 06/29/2024 28  23 - 29 mmol/L Final    Glucose 06/29/2024 94  70 - 110 mg/dL Final    BUN 06/29/2024 23  8 - 23 mg/dL Final    Creatinine 06/29/2024 1.1  0.5 - 1.4 mg/dL Final    Calcium 06/29/2024 6.6 (LL)  8.7 - 10.5 mg/dL Final    Total Protein 06/29/2024 6.8  6.0 - 8.4 g/dL Final    Albumin 06/29/2024 3.9  3.5 - 5.2 g/dL Final    Total Bilirubin 06/29/2024 0.8  0.1 - 1.0 mg/dL Final    Alkaline Phosphatase 06/29/2024 193 (H)  55 - 135 U/L Final    AST 06/29/2024 37  10 - 40 U/L Final    ALT 06/29/2024 49 (H)  10 - 44 U/L Final    eGFR 06/29/2024 >60.0  >60 mL/min/1.73 m^2 Final    Anion Gap  06/29/2024 9  8 - 16 mmol/L Final    Magnesium 06/29/2024 1.9  1.6 - 2.6 mg/dL Final    WBC 06/29/2024 5.73  3.90 - 12.70 K/uL Final    RBC 06/29/2024 3.52 (L)  4.60 - 6.20 M/uL Final    Hemoglobin 06/29/2024 10.9 (L)  14.0 - 18.0 g/dL Final    Hematocrit 06/29/2024 32.5 (L)  40.0 - 54.0 % Final    MCV 06/29/2024 92  82 - 98 fL Final    MCH 06/29/2024 31.0  27.0 - 31.0 pg Final    MCHC 06/29/2024 33.5  32.0 - 36.0 g/dL Final    RDW 06/29/2024 12.8  11.5 - 14.5 % Final    Platelets 06/29/2024 153  150 - 450 K/uL Final    MPV 06/29/2024 11.4  9.2 - 12.9 fL Final    Immature Granulocytes 06/29/2024 1.9 (H)  0.0 - 0.5 % Final    Gran # (ANC) 06/29/2024 3.9  1.8 - 7.7 K/uL Final    Immature Grans (Abs) 06/29/2024 0.11 (H)  0.00 - 0.04 K/uL Final    Lymph # 06/29/2024 0.8 (L)  1.0 - 4.8 K/uL Final    Mono # 06/29/2024 0.5  0.3 - 1.0 K/uL Final    Eos # 06/29/2024 0.4  0.0 - 0.5 K/uL Final    Baso # 06/29/2024 0.04  0.00 - 0.20 K/uL Final    nRBC 06/29/2024 0  0 /100 WBC Final    Gran % 06/29/2024 68.7  38.0 - 73.0 % Final    Lymph % 06/29/2024 14.5 (L)  18.0 - 48.0 % Final    Mono % 06/29/2024 7.9  4.0 - 15.0 % Final    Eosinophil % 06/29/2024 6.3  0.0 - 8.0 % Final    Basophil % 06/29/2024 0.7  0.0 - 1.9 % Final    Differential Method 06/29/2024 Automated   Final    Ionized Calcium 06/29/2024 0.84 (L)  1.06 - 1.42 mmol/L Final    Ionized Calcium 06/29/2024 0.97 (L)  1.06 - 1.42 mmol/L Final    PTH, Intact 06/29/2024 12.8  9.0 - 77.0 pg/mL Final    POC Glucose 06/29/2024 133 (H)  70 - 110 Final    Ionized Calcium 06/29/2024 0.97 (L)  1.06 - 1.42 mmol/L Final    POC Glucose 06/29/2024 145 (H)  70 - 110 Final    Ionized Calcium 06/29/2024 1.06  1.06 - 1.42 mmol/L Final    Sodium 06/30/2024 139  136 - 145 mmol/L Final    Potassium 06/30/2024 4.3  3.5 - 5.1 mmol/L Final    Chloride 06/30/2024 104  95 - 110 mmol/L Final    CO2 06/30/2024 27  23 - 29 mmol/L Final    Glucose 06/30/2024 112 (H)  70 - 110 mg/dL Final    BUN  06/30/2024 21  8 - 23 mg/dL Final    Creatinine 06/30/2024 1.1  0.5 - 1.4 mg/dL Final    Calcium 06/30/2024 8.1 (L)  8.7 - 10.5 mg/dL Final    Total Protein 06/30/2024 6.7  6.0 - 8.4 g/dL Final    Albumin 06/30/2024 3.8  3.5 - 5.2 g/dL Final    Total Bilirubin 06/30/2024 0.9  0.1 - 1.0 mg/dL Final    Alkaline Phosphatase 06/30/2024 197 (H)  55 - 135 U/L Final    AST 06/30/2024 35  10 - 40 U/L Final    ALT 06/30/2024 46 (H)  10 - 44 U/L Final    eGFR 06/30/2024 >60.0  >60 mL/min/1.73 m^2 Final    Anion Gap 06/30/2024 8  8 - 16 mmol/L Final    Magnesium 06/30/2024 1.8  1.6 - 2.6 mg/dL Final    Ionized Calcium 06/30/2024 1.03 (L)  1.06 - 1.42 mmol/L Final    POC Glucose 06/30/2024 117 (H)  70 - 110 Final    POC Glucose 06/30/2024 157 (H)  70 - 110 Final    Ionized Calcium 06/30/2024 1.09  1.06 - 1.42 mmol/L Final   Lab Visit on 06/28/2024   Component Date Value Ref Range Status    WBC 06/28/2024 5.30  3.90 - 12.70 K/uL Final    RBC 06/28/2024 3.65 (L)  4.60 - 6.20 M/uL Final    Hemoglobin 06/28/2024 11.5 (L)  14.0 - 18.0 g/dL Final    Hematocrit 06/28/2024 34.6 (L)  40.0 - 54.0 % Final    MCV 06/28/2024 95  82 - 98 fL Final    MCH 06/28/2024 31.5 (H)  27.0 - 31.0 pg Final    MCHC 06/28/2024 33.2  32.0 - 36.0 g/dL Final    RDW 06/28/2024 12.8  11.5 - 14.5 % Final    Platelets 06/28/2024 159  150 - 450 K/uL Final    MPV 06/28/2024 11.9  9.2 - 12.9 fL Final    Immature Granulocytes 06/28/2024 1.5 (H)  0.0 - 0.5 % Final    Gran # (ANC) 06/28/2024 3.6  1.8 - 7.7 K/uL Final    Immature Grans (Abs) 06/28/2024 0.08 (H)  0.00 - 0.04 K/uL Final    Lymph # 06/28/2024 0.9 (L)  1.0 - 4.8 K/uL Final    Mono # 06/28/2024 0.4  0.3 - 1.0 K/uL Final    Eos # 06/28/2024 0.3  0.0 - 0.5 K/uL Final    Baso # 06/28/2024 0.02  0.00 - 0.20 K/uL Final    nRBC 06/28/2024 0  0 /100 WBC Final    Gran % 06/28/2024 68.5  38.0 - 73.0 % Final    Lymph % 06/28/2024 16.8 (L)  18.0 - 48.0 % Final    Mono % 06/28/2024 7.0  4.0 - 15.0 % Final    Eosinophil  % 06/28/2024 5.8  0.0 - 8.0 % Final    Basophil % 06/28/2024 0.4  0.0 - 1.9 % Final    Differential Method 06/28/2024 Automated   Final    Sodium 06/28/2024 139  136 - 145 mmol/L Final    Potassium 06/28/2024 4.0  3.5 - 5.1 mmol/L Final    Chloride 06/28/2024 102  95 - 110 mmol/L Final    CO2 06/28/2024 28  23 - 29 mmol/L Final    Glucose 06/28/2024 139 (H)  70 - 110 mg/dL Final    BUN 06/28/2024 25 (H)  8 - 23 mg/dL Final    Creatinine 06/28/2024 1.1  0.5 - 1.4 mg/dL Final    Calcium 06/28/2024 6.2 (LL)  8.7 - 10.5 mg/dL Final    Total Protein 06/28/2024 7.1  6.0 - 8.4 g/dL Final    Albumin 06/28/2024 4.1  3.5 - 5.2 g/dL Final    Total Bilirubin 06/28/2024 0.9  0.1 - 1.0 mg/dL Final    Alkaline Phosphatase 06/28/2024 222 (H)  55 - 135 U/L Final    AST 06/28/2024 43 (H)  10 - 40 U/L Final    ALT 06/28/2024 53 (H)  10 - 44 U/L Final    eGFR 06/28/2024 >60.0  >60 mL/min/1.73 m^2 Final    Anion Gap 06/28/2024 9  8 - 16 mmol/L Final    Iron 06/28/2024 88  45 - 160 ug/dL Final    Transferrin 06/28/2024 197 (L)  200 - 375 mg/dL Final    TIBC 06/28/2024 276  250 - 450 ug/dL Final    Saturated Iron 06/28/2024 32  20 - 50 % Final    Ferritin 06/28/2024 770.9 (H)  20.0 - 300.0 ng/mL Final   Lab Visit on 05/17/2024   Component Date Value Ref Range Status    WBC 05/17/2024 4.85  3.90 - 12.70 K/uL Final    RBC 05/17/2024 4.00 (L)  4.60 - 6.20 M/uL Final    Hemoglobin 05/17/2024 12.8 (L)  14.0 - 18.0 g/dL Final    Hematocrit 05/17/2024 38.4 (L)  40.0 - 54.0 % Final    MCV 05/17/2024 96  82 - 98 fL Final    MCH 05/17/2024 32.0 (H)  27.0 - 31.0 pg Final    MCHC 05/17/2024 33.3  32.0 - 36.0 g/dL Final    RDW 05/17/2024 12.0  11.5 - 14.5 % Final    Platelets 05/17/2024 113 (L)  150 - 450 K/uL Final    MPV 05/17/2024 12.3  9.2 - 12.9 fL Final    Immature Granulocytes 05/17/2024 0.6 (H)  0.0 - 0.5 % Final    Gran # (ANC) 05/17/2024 3.5  1.8 - 7.7 K/uL Final    Immature Grans (Abs) 05/17/2024 0.03  0.00 - 0.04 K/uL Final    Lymph #  05/17/2024 0.8 (L)  1.0 - 4.8 K/uL Final    Mono # 05/17/2024 0.3  0.3 - 1.0 K/uL Final    Eos # 05/17/2024 0.2  0.0 - 0.5 K/uL Final    Baso # 05/17/2024 0.02  0.00 - 0.20 K/uL Final    nRBC 05/17/2024 0  0 /100 WBC Final    Gran % 05/17/2024 72.2  38.0 - 73.0 % Final    Lymph % 05/17/2024 15.5 (L)  18.0 - 48.0 % Final    Mono % 05/17/2024 6.4  4.0 - 15.0 % Final    Eosinophil % 05/17/2024 4.9  0.0 - 8.0 % Final    Basophil % 05/17/2024 0.4  0.0 - 1.9 % Final    Differential Method 05/17/2024 Automated   Final    Sodium 05/17/2024 139  136 - 145 mmol/L Final    Potassium 05/17/2024 4.4  3.5 - 5.1 mmol/L Final    Chloride 05/17/2024 102  95 - 110 mmol/L Final    CO2 05/17/2024 31 (H)  23 - 29 mmol/L Final    Glucose 05/17/2024 121 (H)  70 - 110 mg/dL Final    BUN 05/17/2024 29 (H)  8 - 23 mg/dL Final    Creatinine 05/17/2024 1.2  0.5 - 1.4 mg/dL Final    Calcium 05/17/2024 8.1 (L)  8.7 - 10.5 mg/dL Final    Total Protein 05/17/2024 7.0  6.0 - 8.4 g/dL Final    Albumin 05/17/2024 4.4  3.5 - 5.2 g/dL Final    Total Bilirubin 05/17/2024 1.1 (H)  0.1 - 1.0 mg/dL Final    Alkaline Phosphatase 05/17/2024 124  55 - 135 U/L Final    AST 05/17/2024 32  10 - 40 U/L Final    ALT 05/17/2024 35  10 - 44 U/L Final    eGFR 05/17/2024 >60.0  >60 mL/min/1.73 m^2 Final    Anion Gap 05/17/2024 6 (L)  8 - 16 mmol/L Final    Iron 05/17/2024 94  45 - 160 ug/dL Final    Transferrin 05/17/2024 208  200 - 375 mg/dL Final    TIBC 05/17/2024 291  250 - 450 ug/dL Final    Saturated Iron 05/17/2024 32  20 - 50 % Final    Ferritin 05/17/2024 244.0  20.0 - 300.0 ng/mL Final   Lab Visit on 05/13/2024   Component Date Value Ref Range Status    Sodium 05/13/2024 138  136 - 145 mmol/L Final    Potassium 05/13/2024 4.0  3.5 - 5.1 mmol/L Final    Chloride 05/13/2024 102  95 - 110 mmol/L Final    CO2 05/13/2024 27  23 - 29 mmol/L Final    Glucose 05/13/2024 193 (H)  70 - 110 mg/dL Final    BUN 05/13/2024 28 (H)  8 - 23 mg/dL Final    Creatinine  05/13/2024 1.2  0.5 - 1.4 mg/dL Final    Calcium 05/13/2024 7.8 (L)  8.7 - 10.5 mg/dL Final    Anion Gap 05/13/2024 9  8 - 16 mmol/L Final    eGFR 05/13/2024 >60.0  >60 mL/min/1.73 m^2 Final    PTH, Intact 05/13/2024 12.9  9.0 - 77.0 pg/mL Final    Vit D, 25-Hydroxy 05/13/2024 78  30 - 96 ng/mL Final   Admission on 04/09/2024, Discharged on 04/09/2024   Component Date Value Ref Range Status    POC Glucose 04/09/2024 116 (H)  70 - 110 Final       1. Cough, unspecified type  Comments:  Cause of cough uncertain.  Wants 2 or 3 bottles of codeine cough syrup.  Salt water gargles, steam inhalation and promethazine DM sparingly at night  Orders:  -     promethazine-dextromethorphan (PROMETHAZINE-DM) 6.25-15 mg/5 mL Syrp; Take 5 mLs by mouth nightly as needed (cough and cogestion).  Dispense: 120 mL; Refill: 2    2. Rhinorrhea  Comments:  Possibly some postnasal drip and irritation in throat.  Orders:  -     promethazine-dextromethorphan (PROMETHAZINE-DM) 6.25-15 mg/5 mL Syrp; Take 5 mLs by mouth nightly as needed (cough and cogestion).  Dispense: 120 mL; Refill: 2    3. Larynx cancer  Comments:  History of larynx cancer status post surgery has been noted.  Overview:  9/16/20-10/30/20 radiation to larynx and bilateral necks  1/6/22 TL with bilateral neck dissection and total thyroidectomy    Orders:  -     promethazine-dextromethorphan (PROMETHAZINE-DM) 6.25-15 mg/5 mL Syrp; Take 5 mLs by mouth nightly as needed (cough and cogestion).  Dispense: 120 mL; Refill: 2    4. Hospital discharge follow-up    5. Secondary malignant neoplasm of other specified sites    6. Postprocedural hypoparathyroidism  Assessment & Plan:  Following surgical for laryngeal cancer.  Currently on IV calcium and vitamin D3 supplements.      7. Aortic atherosclerosis             Plan:   Cough, unspecified type  Comments:  Cause of cough uncertain.  Wants 2 or 3 bottles of codeine cough syrup.  Salt water gargles, steam inhalation and promethazine DM  sparingly at night  Orders:  -     promethazine-dextromethorphan (PROMETHAZINE-DM) 6.25-15 mg/5 mL Syrp; Take 5 mLs by mouth nightly as needed (cough and cogestion).  Dispense: 120 mL; Refill: 2    Rhinorrhea  Comments:  Possibly some postnasal drip and irritation in throat.  Orders:  -     promethazine-dextromethorphan (PROMETHAZINE-DM) 6.25-15 mg/5 mL Syrp; Take 5 mLs by mouth nightly as needed (cough and cogestion).  Dispense: 120 mL; Refill: 2    Larynx cancer  Comments:  History of larynx cancer status post surgery has been noted.  Orders:  -     promethazine-dextromethorphan (PROMETHAZINE-DM) 6.25-15 mg/5 mL Syrp; Take 5 mLs by mouth nightly as needed (cough and cogestion).  Dispense: 120 mL; Refill: 2    Hospital discharge follow-up    Secondary malignant neoplasm of other specified sites    Postprocedural hypoparathyroidism    Aortic atherosclerosis      Patient's recent hospitalization for tetany has been noted.      He comes here today for asking for Robitussin with codeine or codeine cough syrup to help with the congestion and rattling in his chest.  I think he would benefit more from interventions like Robinul or probably transderm patch scopolamine.  He states that he tried the patch with no relief.    General condition continues to be somewhat on the poor side.    He continues with the calcium and vitamin D3 supplements.    Overall prognosis guarded.      He has a follow-up in a couple of weeks or so.      Follow up in about 3 weeks (around 7/29/2024), or if symptoms worsen or fail to improve, for Larynx Cancer.      Current Outpatient Medications:     acetaminophen (TYLENOL) 325 MG tablet, Take 325 mg by mouth every 6 (six) hours as needed for Pain., Disp: , Rfl:     calcium carbonate 500 mg/5 mL (1,250 mg/5 mL), Take 20 mls four times daily with meals and nightly., Disp: 2300 mL, Rfl: 12    cholecalciferol, vitamin D3, (VITAMIN D3) 10 mcg/mL (400 unit/mL) Drop, Take 2.5 mLs (1,000 Units total) by  mouth once daily., Disp: 75 mL, Rfl: 2    diclofenac sodium (VOLTAREN ARTHRITIS PAIN) 1 % Gel, Apply 2 g topically once daily. Apply couple of times a day on the affected arthritis pain., Disp: 100 g, Rfl: 1    esomeprazole (NEXIUM) 40 MG capsule, 40 mg before breakfast., Disp: , Rfl:     famotidine (PEPCID) 40 mg/5 mL (8 mg/mL) suspension, Give 2.5 mLs (20 mg total) by Per G Tube route 2 (two) times daily., Disp: 50 mL, Rfl: 11    guaiFENesin-codeine 100-10 mg/5 ml (TUSSI-ORGANIDIN NR)  mg/5 mL syrup, Take 5 mLs by mouth 3 (three) times daily as needed for Cough., Disp: 237 mL, Rfl: 0    lactose-reduced food with fibr (JEVITY 1.5 TRISHA) 0.06 gram-1.5 kcal/mL Liqd, 6 cartons per day via peg.  Flush 60ml before and after each bolus (Patient taking differently: 240 mLs by Per G Tube route 5 (five) times daily. 6 cartons per day via peg.  Flush 60ml before and after each bolus), Disp: 180 each, Rfl: 11    levothyroxine (SYNTHROID) 100 MCG tablet, 1 tablet (100 mcg total) by Per G Tube route before breakfast., Disp: 30 tablet, Rfl: 11    promethazine-dextromethorphan (PROMETHAZINE-DM) 6.25-15 mg/5 mL Syrp, Take 5 mLs by mouth nightly as needed (cough and cogestion)., Disp: 120 mL, Rfl: 2    Maico Patterson

## 2024-07-09 ENCOUNTER — TELEPHONE (OUTPATIENT)
Facility: CLINIC | Age: 74
End: 2024-07-09
Payer: MEDICARE

## 2024-07-09 NOTE — TELEPHONE ENCOUNTER
Medical Nutrition Therapy Oncology Progress Note      Patient's PCP:Maico Patterson MD  Referring Provider: No ref. provider found  Subjective:        Patient ID: Cyrus Batres Jr. is a 73 y.o. male.    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    Tongue cancer     Tracheostomy dependence     Type 2 diabetes mellitus, without long-term current use of insulin 11/22/2022    Unspecified glaucoma        Past Surgical History:   Procedure Laterality Date    CATARACT EXTRACTION W/  INTRAOCULAR LENS IMPLANT Right 8/16/2023    Procedure: CEIOL OD  NPO-peg;  Surgeon: Stoney Munoz MD;  Location: [facility] OR;  Service: Ophthalmology;  Laterality: Right;    CATARACT EXTRACTION W/  INTRAOCULAR LENS IMPLANT Left 10/18/2023    Procedure: CEIOL OS npo peg;  Surgeon: Stoney Munoz MD;  Location: [facility] OR;  Service: Ophthalmology;  Laterality: Left;    DIRECT LARYNGOBRONCHOSCOPY N/A 12/27/2021    Procedure: LARYNGOSCOPY, DIRECT, WITH BRONCHOSCOPY;  Surgeon: Flex Espinosa MD;  [facility];  Service: ENT;  Laterality: N/A;    DIRECT LARYNGOSCOPY  11/9/2022    Procedure: LARYNGOSCOPY, DIRECT;  Surgeon: Jesse James MD;  [facility];  Service: ENT;;    DISSECTION OF NECK Bilateral 1/6/2022    Procedure: DISSECTION, NECK;  Surgeon:  Jesse James MD;  Location: [facility] OR 2ND FLR;  Service: ENT;  Laterality: Bilateral;    DISSECTION OF NECK Bilateral 11/9/2022    Procedure: DISSECTION, NECK;  Surgeon: Jesse James MD;  Location: [facility] OR [facility] FLR;  Service: ENT;  Laterality: Bilateral;    ESOPHAGOGASTRODUODENOSCOPY N/A 4/9/2024    Procedure: EGD (ESOPHAGOGASTRODUODENOSCOPY);  Surgeon: Matheus Cooper III, MD;  Location: [facility] ENDO;  Service: Endoscopy;  Laterality: N/A;    FLAP PROCEDURE Right 1/6/2022    Procedure: CREATION, FREE FLAP;  Surgeon: Alise Hart MD;  Location: [facility] OR [facility] FLR;  Service: ENT;  Laterality: Right;  Ischemic start 1351  Ischemic stop 1502    FLAP PROCEDURE Left 11/9/2022    Procedure: CREATION, FREE FLAP, ALT;  Surgeon: Alise Hart MD;  Location: [facility] OR [facility] FLR;  Service: ENT;  Laterality: Left;    GLOSSECTOMY Bilateral 11/9/2022    Procedure: TOTAL GLOSSECTOMY;  Surgeon: Jesse James MD;  [facility];  Service: ENT;  Laterality: Bilateral;    INSERTION OF TUNNELED CENTRAL VENOUS CATHETER (CVC) WITH SUBCUTANEOUS PORT N/A 6/9/2022    Procedure: [id number];  Surgeon: Jesus Viera MD;  Location: [facility] OR;  Service: General;  Laterality: N/A;    INSERTION OF TUNNELED CENTRAL VENOUS CATHETER (CVC) WITH SUBCUTANEOUS PORT Right 1/12/2023    Procedure: [id number];  Surgeon: Abdulaziz Le Jr., MD;  Location: [facility] OR;  Service: General;  Laterality: Right;    LARYNGECTOMY N/A 1/6/2022    Procedure: LARYNGECTOMY;  Surgeon: Jesse James MD;  [facility];  Service: ENT;  Laterality: N/A;    LARYNGOSCOPY N/A 8/4/2020    Procedure: Suspension microlaryngoscopy with biopsy, possible KTP laser treatment/excision;  Surgeon: Stew Noel MD;  Location: [facility] OR [facility] FLR;  Service: ENT;  Laterality: N/A;  Microscope, telescopes, tower, microinstruments, KTP laser, rep conf# [id number] IC 7/28.    LARYNGOSCOPY N/A 3/16/2021    Procedure:  Suspension microlaryngoscopy with excision of lesion, possible CO2 laser;  Surgeon: Stew Noel MD;  [facility];  Service: ENT;  Laterality: N/A;  Microscope, telescopes, tower, microinstruments, CO2 laser, rep conf# [id number] IC 3/4.    LARYNGOSCOPY N/A 4/1/2021    Procedure: Suspension microlaryngoscopy with KTP laser excision of lesion;  Surgeon: Stew Noel MD;  [facility];  Service: ENT;  Laterality: N/A;  Microscope, telescopes, tower, microinstruments, 70 degree scope, vocal fold , KTP laser, rep conf# [id number] BC    LARYNGOSCOPY N/A 12/9/2021    Procedure: Suspension microlaryngoscopy with biopsy;  Surgeon: Stew Noel MD;  [facility];  Service: ENT;  Laterality: N/A;  Microscope, telescopes, tower, microinstruments    LARYNGOSCOPY N/A 1/6/2022    Procedure: LARYNGOSCOPY;  Surgeon: Jesse James MD;  [facility];  Service: ENT;  Laterality: N/A;    LARYNGOSCOPY N/A 4/27/2022    Procedure: LARYNGOSCOPY WITH BIOPSY;  Surgeon: Jesse James MD;  [facility];  Service: ENT;  Laterality: N/A;    MICROLARYNGOSCOPY N/A 3/17/2020    Procedure: MICROLARYNGOSCOPY;  Surgeon: Jung Xiao MD;  Location: [facility];  Service: ENT;  Laterality: N/A;  Laser Microlaryngoscopy  NEED TO SCHEDULE LASER from [facility] [phone]    PHARYNGECTOMY  11/9/2022    Procedure: TOTAL PHARYNGECTOMY;  Surgeon: Jesse James MD;  Location: [facility] OR [address] [place] [address];  Service: ENT;;    REIMPLANTATION OF PARATHYROID TISSUE N/A 1/6/2022    Procedure: REIMPLANTATION, PARATHYROID TISSUE;  Surgeon: Jesse James MD;  [facility];  Service: ENT;  Laterality: N/A;    SKIN SPLIT GRAFT Right 11/9/2022    Procedure: APPLICATION, GRAFT, SKIN, SPLIT-THICKNESS;  Surgeon: Jesse James MD;  Location: [facility] OR [address] [place] [address];  Service: ENT;  Laterality: Right;    THYROIDECTOMY  1/6/2022    Procedure: THYROIDECTOMY;   Surgeon: Jesse James MD;  Location: [facility] OR [address] [place] [address];  Service: ENT;;    TRACHEOSTOMY N/A 12/27/2021    Procedure: CREATION, TRACHEOSTOMY;  Surgeon: Flex Espinosa MD;  [facility];  Service: ENT;  Laterality: N/A;       Social History     Socioeconomic History    Marital status:      Spouse name: Janel Batres    Number of children: 2   Occupational History    Occupation: [id number]     Employer: [employer]   Tobacco Use    Smoking status: Never    Smokeless tobacco: Never   Substance and Sexual Activity    Alcohol use: Not Currently     Comment: occasional    Drug use: No    Sexual activity: Yes     Partners: Female   Social History Narrative    ** Merged History Encounter **         2 children from his 1st wife     Social Determinants of Health     Financial Resource Strain: Low Risk  (2/2/2024)    Overall Financial Resource Strain (CARDIA)     Difficulty of Paying Living Expenses: Not hard at all   Food Insecurity: No Food Insecurity (2/2/2024)    Hunger Vital Sign     Worried About Running Out of Food in the Last Year: Never true     Ran Out of Food in the Last Year: Never true   Transportation Needs: No Transportation Needs (2/2/2024)    PRAPARE - Transportation     Lack of Transportation (Medical): No     Lack of Transportation (Non-Medical): No   Physical Activity: Insufficiently Active (2/2/2024)    Exercise Vital Sign     Days of Exercise per Week: 3 days     Minutes of Exercise per Session: 30 min   Stress: No Stress Concern Present (2/2/2024)    [employer] - Occupational Stress Questionnaire     Feeling of Stress : Not at all   Housing Stability: Low Risk  (2/2/2024)    Housing Stability Vital Sign     Unable to Pay for Housing in the Last Year: No     Number of Places Lived in the Last Year: 1     Unstable Housing in the Last Year: No       Family History   Problem Relation Name Age of Onset    Abnormal EKG Mother Hayley     Diabetes Father  Nicolás     Heart disease Father Nicolás     Hypertension Father Nicolás        Review of patient's allergies indicates:   Allergen Reactions    Lovastatin Itching    Pollen extracts     Lovastatin Rash     Not confirmed but pt skeptical       Current Outpatient Medications:     acetaminophen (TYLENOL) 325 MG tablet, Take 325 mg by mouth every 6 (six) hours as needed for Pain., Disp: , Rfl:     calcium carbonate 500 mg/5 mL (1,250 mg/5 mL), Take 20 mls four times daily with meals and nightly., Disp: 2300 mL, Rfl: 12    cholecalciferol, vitamin D3, (VITAMIN D3) 10 mcg/mL (400 unit/mL) Drop, Take 2.5 mLs (1,000 Units total) by mouth once daily., Disp: 75 mL, Rfl: 2    diclofenac sodium (VOLTAREN ARTHRITIS PAIN) 1 % Gel, Apply 2 g topically once daily. Apply couple of times a day on the affected arthritis pain., Disp: 100 g, Rfl: 1    esomeprazole (NEXIUM) 40 MG capsule, 40 mg before breakfast., Disp: , Rfl:     famotidine (PEPCID) 40 mg/5 mL (8 mg/mL) suspension, Give 2.5 mLs (20 mg total) by Per G Tube route 2 (two) times daily., Disp: 50 mL, Rfl: 11    guaiFENesin-codeine 100-10 mg/5 ml (TUSSI-ORGANIDIN NR)  mg/5 mL syrup, Take 5 mLs by mouth 3 (three) times daily as needed for Cough., Disp: 237 mL, Rfl: 0    lactose-reduced food with fibr (JEVITY 1.5 TRISHA) 0.06 gram-1.5 kcal/mL Liqd, 6 cartons per day via peg.  Flush 60ml before and after each bolus (Patient taking differently: 240 mLs by Per G Tube route 5 (five) times daily. 6 cartons per day via peg.  Flush 60ml before and after each bolus), Disp: 180 each, Rfl: 11    levothyroxine (SYNTHROID) 100 MCG tablet, 1 tablet (100 mcg total) by Per G Tube route before breakfast., Disp: 30 tablet, Rfl: 11    promethazine-dextromethorphan (PROMETHAZINE-DM) 6.25-15 mg/5 mL Syrp, Take 5 mLs by mouth nightly as needed (cough and cogestion)., Disp: 120 mL, Rfl: 2    All medications and past history have been reviewed.    OP HEAD NECK CISPLATIN WEEKLY +  RADIOTHERAPY      Treatment Goal:   Curative      Status:   Active      Start Date:   1/23/2023      End Date:   3/6/2023      Provider:   Venu Tamez MD      Chemotherapy:   CISplatin (Platinol) 40 mg/m2 = 66 mg in sodium chloride 0.9% 631 mL chemo infusion, 40 mg/m2 = 66 mg, Intravenous, [facility] 1 time, 7 of 7 cycles    Administration: 66 mg (1/23/2023), 66 mg (2/13/2023), 66 mg (2/6/2023), 66 mg (2/20/2023), 66 mg (2/27/2023), 66 mg (3/6/2023)      Objective:      Wt Readings from Last 3 Encounters:   07/08/24 67.1 kg (148 lb)   06/30/24 67.1 kg (147 lb 14.9 oz)   06/26/24 68.7 kg (151 lb 8 oz)     Wt Readings from Last 20 Encounters:   03/06/23 58.6 kg (129 lb 3.2 oz)   03/06/23 58.6 kg (129 lb 3.2 oz)   02/27/23 57.5 kg (126 lb 11.2 oz)   02/27/23 57.5 kg (126 lb 11.2 oz)   02/22/23 58.1 kg (128 lb)   02/20/23 58.3 kg (128 lb 9.6 oz)   02/13/23 58.5 kg (129 lb)   02/13/23 58.5 kg (129 lb)   02/07/23 60.1 kg (132 lb 7.9 oz)   02/06/23 56.8 kg (125 lb 3.2 oz)   02/06/23 56.8 kg (125 lb 3.2 oz)   01/31/23 58.7 kg (129 lb 6.6 oz)   01/30/23 56.4 kg (124 lb 7.2 oz)   01/30/23 56.4 kg (124 lb 7.2 oz)   01/27/23 58.5 kg (129 lb)   01/26/23 59 kg (130 lb 1.6 oz)   01/23/23 58.4 kg (128 lb 12 oz)   01/23/23 58.4 kg (128 lb 12 oz)   01/18/23 59.4 kg (130 lb 15.3 oz)   01/13/23 59.4 kg (130 lb 14.4 oz)       Last Labs:  Last Labs:  Glucose   Date Value Ref Range Status   06/30/2024 112 (H) 70 - 110 mg/dL Final   06/29/2024 94 70 - 110 mg/dL Final     BUN   Date Value Ref Range Status   06/30/2024 21 8 - 23 mg/dL Final   06/29/2024 23 8 - 23 mg/dL Final     Creatinine   Date Value Ref Range Status   06/30/2024 1.1 0.5 - 1.4 mg/dL Final   06/29/2024 1.1 0.5 - 1.4 mg/dL Final     Sodium   Date Value Ref Range Status   06/30/2024 139 136 - 145 mmol/L Final   06/29/2024 141 136 - 145 mmol/L Final     Potassium   Date Value Ref Range Status   06/30/2024 4.3 3.5 - 5.1 mmol/L Final   06/29/2024 3.9 3.5 - 5.1 mmol/L  Final     Phosphorus   Date Value Ref Range Status   11/24/2022 2.4 (L) 2.7 - 4.5 mg/dL Final   11/23/2022 3.6 2.7 - 4.5 mg/dL Final     Calcium   Date Value Ref Range Status   06/30/2024 8.1 (L) 8.7 - 10.5 mg/dL Final   06/29/2024 6.6 (LL) 8.7 - 10.5 mg/dL Final     Comment:     Critical result CA 6.6 mg/dL called to and read back by Shakira Waters,  Cardio B, RN. at 29-Jun-2024 04:[facility]  Result Rechecked       Prealbumin   Date Value Ref Range Status   09/03/2022 19 (L) 20.0 - 43.0 mg/dL Final     Total Protein   Date Value Ref Range Status   06/30/2024 6.7 6.0 - 8.4 g/dL Final   06/29/2024 6.8 6.0 - 8.4 g/dL Final     Cholesterol   Date Value Ref Range Status   05/17/2023 162 120 - 199 mg/dL Final     Comment:     The National Cholesterol Education Program (NCEP) has set the  following guidelines (reference ranges) for Cholesterol:  Optimal.....................<200 mg/dL  Borderline High.............200-239 mg/dL  High........................> or = 240 mg/dL     02/14/2022 144 120 - 199 mg/dL Final     Comment:     The National Cholesterol Education Program (NCEP) has set the  following guidelines (reference ranges) for Cholesterol:  Optimal.....................<200 mg/dL  Borderline High.............200-239 mg/dL  High........................> or = 240 mg/dL       Hemoglobin A1C   Date Value Ref Range Status   06/29/2024 6.8 (H) 4.5 - 6.2 % Final     Comment:     According to ADA guidelines, hemoglobin A1C <7.0% represents  optimal control in non-pregnant diabetic patients.  Different  metrics may apply to specific populations.   Standards of Medical Care in Diabetes - 2016.    For the purpose of screening for the presence of diabetes:  <5.7%     Consistent with the absence of diabetes  5.7-6.4%  Consistent with increasing risk for diabetes   (prediabetes)  >or=6.5%  Consistent with diabetes    Currently no consensus exists for use of hemoglobin A1C  for diagnosis of diabetes for children.    "  01/09/2024 6.3 (H) 4.0 - 5.6 % Final     Comment:     ADA Screening Guidelines:  5.7-6.4%  Consistent with prediabetes  >or=6.5%  Consistent with diabetes    High levels of fetal hemoglobin interfere with the HbA1C  assay. Heterozygous hemoglobin variants (HbS, HgC, etc)do  not significantly interfere with this assay.   However, presence of multiple variants may affect accuracy.       Hemoglobin   Date Value Ref Range Status   06/29/2024 10.9 (L) 14.0 - 18.0 g/dL Final   06/28/2024 11.6 (L) 14.0 - 18.0 g/dL Final     POC Hematocrit   Date Value Ref Range Status   11/09/2022 25 (L) 36 - 54 %PCV Final   11/09/2022 24 (L) 36 - 54 %PCV Final     Hematocrit   Date Value Ref Range Status   06/29/2024 32.5 (L) 40.0 - 54.0 % Final   06/28/2024 35.3 (L) 40.0 - 54.0 % Final     Iron   Date Value Ref Range Status   06/28/2024 88 45 - 160 ug/dL Final   05/17/2024 94 45 - 160 ug/dL Final     No components found for: "FROLATE"  Vit D, 25-Hydroxy   Date Value Ref Range Status   05/13/2024 78 30 - 96 ng/mL Final     Comment:     Vitamin D deficiency.........<10 ng/mL                              Vitamin D insufficiency......10-29 ng/mL       Vitamin D sufficiency........> or equal to 30 ng/mL  Vitamin D toxicity............>100 ng/mL     05/17/2023 40 30 - 96 ng/mL Final     Comment:     Vitamin D deficiency.........<10 ng/mL                              Vitamin D insufficiency......10-29 ng/mL       Vitamin D sufficiency........> or equal to 30 ng/mL  Vitamin D toxicity............>100 ng/mL       WBC   Date Value Ref Range Status   06/29/2024 5.73 3.90 - 12.70 K/uL Final   06/28/2024 5.68 3.90 - 12.70 K/uL Final       Assessment:     Nutrition/Diet History     Patient Reported Diet/Restrictions/Preferences: sips of thin liquids  Food Allergies: NKFA  Factors Affecting Nutritional Intake: s/p laryngectomy, glossectomy    Estimated/Assessed Needs     Weight Used For Calorie Calculations: 62 kg (137 lb)  Energy Calorie " Requirements (kcal): 8914-2760 kcal/day   Energy Need Method: 30-35 Kcal/kg  Protein Requirements:  g/day   Protein Need Method: 1.5-2.0 g/kg  Fluid Requirements: 2000 ml/day  Estimated Fluid Requirement Method: 1ml/kcal      Nutrition Support  Current EN: Jevity 1.5- 6 cartons per day with 60ml FWF before and after each bolus. Provides 2130 calories, 90g protein and 1800ml FW. Recommend extra 60ml FWF TID to meet estimated fluid needs.    NEW EN RECOMMENDATIONS: Glucerna 1.5- 6 cartons per day with 60ml FWF before and after each bolus. EN and FWF provides 2136 calories, 117g protein and 1800ml FW. Recommend extra 60ml FWF QID to meet estimated fluid needs.     Evaluation of Received Nutrient/Fluid Intake     Calorie Intake: meeting needs  Protein Intake: meeting needs  Fluid Intake: meeting needs  Tolerance: tolerating  % Intake of Estimated Energy Needs: 100 % via peg      Nutrition Diagnosis Related to (Etiology) As Evidenced By (Signs/Symptoms)   Less than optimal enteral nutrition composition or modality Physiological causes  Uncontrolled T2DM, elevated BG     RD Notes  Mr. Cyrus Batres is a 71y/o male with 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. Pt is schedule for glossectomy surgery for his base of tongue cancer in 1 month. He is currently NPO and recently switch to Jevity 1.5 and is tolerating 6 cartons per day without associated N/V/D, gas or bloating. He does have chronic diarrhea not associated with feeding times. He present today with questions regarding how he can increase his nutrition to prepare for surgery next month. He reports he would like to switch to [employer] to promote regular BMs and help control BG. He reports recently weight gain of 7-8# since increasing his TF to 6 cartons per day. CW: 137#    1/23/22: RD met with Mr Batres infusion today for nutrition follow up. Pt will be starting new treatment with cisplatin and concurrent XRT s/p extensive  resection last month. Pt denies having any current problems with peg feedings. He has good knowledge of nutrition related side effects of treatment. Denies N/V/D/C. BMs normal. CW: 128#    1/30: Pt reports he continues to bolus 5 cartons of Jevity per day without s/s of intolerance. He is working himself up to 6 cartons per day due to increased needs during CCXRT. He denies N/V/D/C. He reports good hydration via peg. Noted 43 weight loss in 1 week but pt reports he was wearing a heavy coat last week when his weight was taken. CW: 124#    2/6: Pt reports good tolerance to treatment so far. Denies any N/V/D/C, gas or bloating with EN. Continues to work himself up to 6 cartons per day with extra fluids via peg. He denies having any nutrition related questions or concerns at the current time. CW: 125#    2/13: RD met with Mr Batres for nutrition follow up today. Pt is starting cycle 3 of treatment today and denies any N/V/D/C, gas, bloating. He denies having any problems with his peg. He reports he was cleared by ST and his surgeon  to take sips of liquids including smoothies. He denies having any nutrition related questions or concerns at the current time. CW: 129#    2/27: Pt reports good tolerance to his treatment but has been having some nausea over the last week. He continues to use his peg for majority of intake but has started sipping on juice and thin smoothies with ST. He denies having any nutrition related questions or concerns at the current time. CW: 126#    3/6: Pt reports continued nausea/upset stomach but otherwise is tolerating treatment well. This is his last week of CCXRT. He is tolerating his EN well and has been trying to increase intake of smoothies po with help of ST. He denies having any nutrition related questions or concerns at the current time. CW: 129#    1/3/23: Pt reports good tolerance to 6 cartons of Jevity via peg. He reports he has changes insurance and his current infusion company does  not accept his new insurance. Pt is requesting a referral to a new infusion company today. CW: 145#    7/9/24: Pt stated he was recently hospitalized and was given Glucerna with improvement in his BG and denies any N/V/D/C, gas or bloating with change in formula. Pt is requesting formula change to better help control his BG. CW: 148#    Nutrition Intervention:      Nutrition Intervention Enteral Nutrition  Composition   Goals/Expected Outcomes Initiate switch to Glucerna 1.5- 6 cartons per day for BG control   Progress Initial     Nutrition Intervention Fluid-modified diet   Goals/Expected Outcomes Continue aggressive hydration via peg to prevent dehydration    Progress Initial     Plan  Fax sent to [facility] for EN formula  Continue EN at goal rate of 6 cartons per day to meet 100% of estimated needs via peg  Continue aggressive hydration via peg to prevent dehydration  Provided RD contact info. Encouraged pt to contact RD with questions or concerns    Monitoring/Evaluation:     Monitor: TF tolerance, weight, BMs    Next Visit: f/u as needed      I have explained and the patient understands all of  the current recommendation(s). I have answered all of their questions to the best of my ability and to their complete satisfaction.   The patient is to continue with the current management plan.    Electronically signed by: Barbara Zayas MBA, RDN, LDN

## 2024-07-18 ENCOUNTER — PATIENT MESSAGE (OUTPATIENT)
Facility: CLINIC | Age: 74
End: 2024-07-18
Payer: MEDICARE

## 2024-07-19 ENCOUNTER — LAB VISIT (OUTPATIENT)
Dept: LAB | Facility: HOSPITAL | Age: 74
End: 2024-07-19
Attending: INTERNAL MEDICINE
Payer: MEDICARE

## 2024-07-19 DIAGNOSIS — E53.8 BIOTIN-(PROPIONYL-COA-CARBOXYLASE) LIGASE DEFICIENCY: ICD-10-CM

## 2024-07-19 DIAGNOSIS — D69.6 THROMBOCYTOPENIA, UNSPECIFIED: Primary | ICD-10-CM

## 2024-07-19 DIAGNOSIS — R73.9 BLOOD GLUCOSE ELEVATED: ICD-10-CM

## 2024-07-19 LAB
ESTIMATED AVG GLUCOSE: 140 MG/DL (ref 68–131)
FOLATE SERPL-MCNC: 13.9 NG/ML (ref 4–24)
HBA1C MFR BLD: 6.5 % (ref 4.5–6.2)
VIT B12 SERPL-MCNC: 734 PG/ML (ref 210–950)

## 2024-07-19 PROCEDURE — 82607 VITAMIN B-12: CPT | Performed by: INTERNAL MEDICINE

## 2024-07-19 PROCEDURE — 83036 HEMOGLOBIN GLYCOSYLATED A1C: CPT | Performed by: INTERNAL MEDICINE

## 2024-07-19 PROCEDURE — 86038 ANTINUCLEAR ANTIBODIES: CPT | Performed by: INTERNAL MEDICINE

## 2024-07-19 PROCEDURE — 36415 COLL VENOUS BLD VENIPUNCTURE: CPT | Performed by: INTERNAL MEDICINE

## 2024-07-19 PROCEDURE — 82746 ASSAY OF FOLIC ACID SERUM: CPT | Performed by: INTERNAL MEDICINE

## 2024-07-20 LAB — ANA TITR SER IF: NEGATIVE {TITER}

## 2024-07-29 ENCOUNTER — LAB VISIT (OUTPATIENT)
Dept: LAB | Facility: HOSPITAL | Age: 74
End: 2024-07-29
Attending: INTERNAL MEDICINE
Payer: MEDICARE

## 2024-07-29 ENCOUNTER — TELEPHONE (OUTPATIENT)
Dept: FAMILY MEDICINE | Facility: CLINIC | Age: 74
End: 2024-07-29

## 2024-07-29 ENCOUNTER — OFFICE VISIT (OUTPATIENT)
Dept: FAMILY MEDICINE | Facility: CLINIC | Age: 74
End: 2024-07-29
Payer: MEDICARE

## 2024-07-29 VITALS
HEIGHT: 66 IN | TEMPERATURE: 98 F | DIASTOLIC BLOOD PRESSURE: 71 MMHG | SYSTOLIC BLOOD PRESSURE: 116 MMHG | WEIGHT: 145 LBS | BODY MASS INDEX: 23.3 KG/M2

## 2024-07-29 DIAGNOSIS — E83.51 HYPOCALCEMIA: ICD-10-CM

## 2024-07-29 DIAGNOSIS — R68.89 COLD INTOLERANCE: Primary | ICD-10-CM

## 2024-07-29 DIAGNOSIS — C32.9 LARYNX CANCER: ICD-10-CM

## 2024-07-29 DIAGNOSIS — J34.89 RHINORRHEA: ICD-10-CM

## 2024-07-29 DIAGNOSIS — Z93.1 PRESENCE OF EXTERNALLY REMOVABLE PERCUTANEOUS ENDOSCOPIC GASTROSTOMY (PEG) TUBE: ICD-10-CM

## 2024-07-29 DIAGNOSIS — E89.0 POSTABLATIVE HYPOTHYROIDISM: ICD-10-CM

## 2024-07-29 DIAGNOSIS — R05.9 COUGH, UNSPECIFIED TYPE: ICD-10-CM

## 2024-07-29 DIAGNOSIS — R49.1 APHONIA: ICD-10-CM

## 2024-07-29 LAB
T4 FREE SERPL-MCNC: 0.87 NG/DL (ref 0.71–1.51)
TSH SERPL DL<=0.005 MIU/L-ACNC: 5.33 UIU/ML (ref 0.34–5.6)

## 2024-07-29 PROCEDURE — 84439 ASSAY OF FREE THYROXINE: CPT | Performed by: INTERNAL MEDICINE

## 2024-07-29 PROCEDURE — 1101F PT FALLS ASSESS-DOCD LE1/YR: CPT | Mod: CPTII,S$GLB,, | Performed by: INTERNAL MEDICINE

## 2024-07-29 PROCEDURE — 3288F FALL RISK ASSESSMENT DOCD: CPT | Mod: CPTII,S$GLB,, | Performed by: INTERNAL MEDICINE

## 2024-07-29 PROCEDURE — 3074F SYST BP LT 130 MM HG: CPT | Mod: CPTII,S$GLB,, | Performed by: INTERNAL MEDICINE

## 2024-07-29 PROCEDURE — 3008F BODY MASS INDEX DOCD: CPT | Mod: CPTII,S$GLB,, | Performed by: INTERNAL MEDICINE

## 2024-07-29 PROCEDURE — 1157F ADVNC CARE PLAN IN RCRD: CPT | Mod: CPTII,S$GLB,, | Performed by: INTERNAL MEDICINE

## 2024-07-29 PROCEDURE — 84443 ASSAY THYROID STIM HORMONE: CPT | Performed by: INTERNAL MEDICINE

## 2024-07-29 PROCEDURE — 3072F LOW RISK FOR RETINOPATHY: CPT | Mod: CPTII,S$GLB,, | Performed by: INTERNAL MEDICINE

## 2024-07-29 PROCEDURE — 3044F HG A1C LEVEL LT 7.0%: CPT | Mod: CPTII,S$GLB,, | Performed by: INTERNAL MEDICINE

## 2024-07-29 PROCEDURE — 99999 PR PBB SHADOW E&M-EST. PATIENT-LVL III: CPT | Mod: PBBFAC,,, | Performed by: INTERNAL MEDICINE

## 2024-07-29 PROCEDURE — 1126F AMNT PAIN NOTED NONE PRSNT: CPT | Mod: CPTII,S$GLB,, | Performed by: INTERNAL MEDICINE

## 2024-07-29 PROCEDURE — 1111F DSCHRG MED/CURRENT MED MERGE: CPT | Mod: CPTII,S$GLB,, | Performed by: INTERNAL MEDICINE

## 2024-07-29 PROCEDURE — 99214 OFFICE O/P EST MOD 30 MIN: CPT | Mod: S$GLB,,, | Performed by: INTERNAL MEDICINE

## 2024-07-29 PROCEDURE — 36415 COLL VENOUS BLD VENIPUNCTURE: CPT | Performed by: INTERNAL MEDICINE

## 2024-07-29 PROCEDURE — 1160F RVW MEDS BY RX/DR IN RCRD: CPT | Mod: CPTII,S$GLB,, | Performed by: INTERNAL MEDICINE

## 2024-07-29 PROCEDURE — 1159F MED LIST DOCD IN RCRD: CPT | Mod: CPTII,S$GLB,, | Performed by: INTERNAL MEDICINE

## 2024-07-29 PROCEDURE — 3078F DIAST BP <80 MM HG: CPT | Mod: CPTII,S$GLB,, | Performed by: INTERNAL MEDICINE

## 2024-07-29 RX ORDER — PROMETHAZINE HYDROCHLORIDE AND DEXTROMETHORPHAN HYDROBROMIDE 6.25; 15 MG/5ML; MG/5ML
5 SYRUP ORAL EVERY 12 HOURS PRN
Qty: 300 ML | Refills: 5 | Status: SHIPPED | OUTPATIENT
Start: 2024-07-29 | End: 2025-01-31

## 2024-07-29 NOTE — PROGRESS NOTES
Subjective:       Patient ID: Cyrus Batres Jr. is a 74 y.o. male.    Chief Complaint: Thyroid Problem, Cold intolerance, Cough, Head and neck cancer, Hypocalcemia, and Social issues    Patient is a 74-year-old gentleman who comes for follow-up.    His chronic medical conditions include the following:-  1.-APHONIC SECONDARY TO TRACHEOSTOMY STATUS AND COMMUNICATES THROUGH ELECTRONIC CHALK BOARD.  2. FEEDING VIA PEG TUBE WHICH IS STABLE AT THIS POINT.  Gets Jevity feeding  3.-INSOMNIA WITH CURRENT STABLE DEPENDENCY ON AMBIEN 5 MG.  Also trazodone noted in his list.  4.-HISTORY OF DIARRHEA  better  5.- recent labs have shown elevated   Blood sugar and A1c of 6.0.  Feeding has been changed from Jevity to Glucerna.  6.-diabetes mellitus at least in past but I suspect secondary to weight loss sugar numbers have got better.  Currently he is not noted to be on any medications.  7.-GI protection with esomeprazole and famotidine.  8.-hypocalcemia currently on calcium carbonate supplementation.  Possibly secondary to weight loss and nutritional issues.  9. Nine-social issues of  connections, stewardship of health care. Has couple of sisters who live in [place].  Wife lives here.  Children are not involved.  10.- hypothyroidism on Synthroid.  Not sure why he is not following up with endocrinology but wants a prescription from me.      Communication is mostly by a electronic script board.  He can hear well.    Today his questions are:-    1.-why does he feel cold    2.-how is his calcium level doing    3. He would like to have a large bottle of the cough medication.  Not sure about the coverage from insurance.        Cough  This is a chronic problem. The current episode started more than 1 year ago. The problem has been unchanged. The problem occurs constantly. The cough is Non-productive. Associated symptoms include heartburn. Pertinent negatives include no chest pain, headaches, hemoptysis, myalgias, rhinorrhea or wheezing.  Risk factors: Laryngeal cancer, tracheostomy. He has tried prescription cough suppressant for the symptoms. The treatment provided moderate relief. There is no history of asthma or bronchitis.   Thyroid Problem  Presents for follow-up visit. Symptoms include cold intolerance and fatigue. Patient reports no constipation, diarrhea or palpitations. Hoarse voice: aphonia.Symptom course: uncertain and clinically difficult to differentiate.   Gastroesophageal Reflux  He complains of coughing and heartburn. He reports no chest pain or no wheezing. Hoarse voice: aphonia.Associated symptoms include fatigue. He has tried a PPI and a histamine-2 antagonist for the symptoms. The treatment provided moderate relief. PEG tube feeding.       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    Tongue cancer     Tracheostomy dependence     Type 2 diabetes mellitus, without long-term current use of insulin 11/22/2022    Unspecified glaucoma      Social History     Socioeconomic History    Marital status:      Spouse name: Janel Batres    Number of children: 2   Occupational History    Occupation: [employer]     Employer: [employer]   Tobacco Use    Smoking status: Never    Smokeless tobacco: Never   Substance and Sexual Activity    Alcohol use: Not Currently     Comment: occasional    Drug use: No    Sexual activity: Yes     Partners: Female   Social History Narrative    **  Merged History Encounter **         2 children from his 1st wife     Social Determinants of Health     Financial Resource Strain: Low Risk  (2/2/2024)    Overall Financial Resource Strain (CARDIA)     Difficulty of Paying Living Expenses: Not hard at all   Food Insecurity: No Food Insecurity (2/2/2024)    Hunger Vital Sign     Worried About Running Out of Food in the Last Year: Never true     Ran Out of Food in the Last Year: Never true   Transportation Needs: No Transportation Needs (2/2/2024)    PRAPARE - Transportation     Lack of Transportation (Medical): No     Lack of Transportation (Non-Medical): No   Physical Activity: Insufficiently Active (2/2/2024)    Exercise Vital Sign     Days of Exercise per Week: 3 days     Minutes of Exercise per Session: 30 min   Stress: No Stress Concern Present (2/2/2024)    [place] [employer] - Occupational Stress Questionnaire     Feeling of Stress : Not at all   Housing Stability: Low Risk  (2/2/2024)    Housing Stability Vital Sign     Unable to Pay for Housing in the Last Year: No     Number of Places Lived in the Last Year: 1     Unstable Housing in the Last Year: No     Past Surgical History:   Procedure Laterality Date    CATARACT EXTRACTION W/  INTRAOCULAR LENS IMPLANT Right 8/16/2023    Procedure: CEIOL OD  NPO-peg;  Surgeon: Stoney Munoz MD;  Location: [facility] OR;  Service: Ophthalmology;  Laterality: Right;    CATARACT EXTRACTION W/  INTRAOCULAR LENS IMPLANT Left 10/18/2023    Procedure: CEIOL OS npo peg;  Surgeon: Stoney Munoz MD;  Location: [facility] OR;  Service: Ophthalmology;  Laterality: Left;    DIRECT LARYNGOBRONCHOSCOPY N/A 12/27/2021    Procedure: LARYNGOSCOPY, DIRECT, WITH BRONCHOSCOPY;  Surgeon: Flex Espinosa MD;  Location: [address];  Service: ENT;  Laterality: N/A;    DIRECT LARYNGOSCOPY  11/9/2022    Procedure: LARYNGOSCOPY, DIRECT;  Surgeon: Jesse James MD;  Location: [facility] OR [address] [place] [address];  Service: ENT;;     DISSECTION OF NECK Bilateral 1/6/2022    Procedure: DISSECTION, NECK;  Surgeon: Jesse James MD;  Location: [facility] OR [facility];  Service: ENT;  Laterality: Bilateral;    DISSECTION OF NECK Bilateral 11/9/2022    Procedure: DISSECTION, NECK;  Surgeon: Jesse James MD;  Location: [facility] OR [facility];  Service: ENT;  Laterality: Bilateral;    ESOPHAGOGASTRODUODENOSCOPY N/A 4/9/2024    Procedure: EGD (ESOPHAGOGASTRODUODENOSCOPY);  Surgeon: Matheus Cooper III, MD;  Location: [facility] ENDO;  Service: Endoscopy;  Laterality: N/A;    FLAP PROCEDURE Right 1/6/2022    Procedure: CREATION, FREE FLAP;  Surgeon: Alise Hart MD;  Location: [facility] OR [facility];  Service: ENT;  Laterality: Right;  Ischemic start 1351  Ischemic stop 1502    FLAP PROCEDURE Left 11/9/2022    Procedure: CREATION, FREE FLAP, ALT;  Surgeon: Alise Hart MD;  Location: [facility] OR [facility];  Service: ENT;  Laterality: Left;    GLOSSECTOMY Bilateral 11/9/2022    Procedure: TOTAL GLOSSECTOMY;  Surgeon: Jesse James MD;  [facility];  Service: ENT;  Laterality: Bilateral;    INSERTION OF TUNNELED CENTRAL VENOUS CATHETER (CVC) WITH SUBCUTANEOUS PORT N/A 6/9/2022    Procedure: [id number];  Surgeon: Jesus Viera MD;  Location: [facility] OR;  Service: General;  Laterality: N/A;    INSERTION OF TUNNELED CENTRAL VENOUS CATHETER (CVC) WITH SUBCUTANEOUS PORT Right 1/12/2023    Procedure: [id number];  Surgeon: Abdulaziz Le Jr., MD;  Location: [facility] OR;  Service: General;  Laterality: Right;    LARYNGECTOMY N/A 1/6/2022    Procedure: LARYNGECTOMY;  Surgeon: Jesse James MD;  [facility];  Service: ENT;  Laterality: N/A;    LARYNGOSCOPY N/A 8/4/2020    Procedure: Suspension microlaryngoscopy with biopsy, possible KTP laser treatment/excision;  Surgeon: Stew Noel MD;  Location: [facility] OR [facility];  Service: ENT;  Laterality: N/A;  Microscope, telescopes, tower, microinstruments, KTP  laser, rep conf# [id number] IC 7/28.    LARYNGOSCOPY N/A 3/16/2021    Procedure: Suspension microlaryngoscopy with excision of lesion, possible CO2 laser;  Surgeon: Stew Noel MD;  Location: [facility] OR [address] [place] [address];  Service: ENT;  Laterality: N/A;  Microscope, telescopes, tower, microinstruments, CO2 laser, rep conf# [id number] IC 3/4.    LARYNGOSCOPY N/A 4/1/2021    Procedure: Suspension microlaryngoscopy with KTP laser excision of lesion;  Surgeon: Stew Noel MD;  Location: [facility] OR [address] [place] [address];  Service: ENT;  Laterality: N/A;  Microscope, telescopes, tower, microinstruments, 70 degree scope, vocal fold , KTP laser, rep conf# [id number] BC    LARYNGOSCOPY N/A 12/9/2021    Procedure: Suspension microlaryngoscopy with biopsy;  Surgeon: Stew Noel MD;  Location: [address];  Service: ENT;  Laterality: N/A;  Microscope, telescopes, tower, microinstruments    LARYNGOSCOPY N/A 1/6/2022    Procedure: LARYNGOSCOPY;  Surgeon: Jesse James MD;  Location: [facility] OR [address] [place] [address];  Service: ENT;  Laterality: N/A;    LARYNGOSCOPY N/A 4/27/2022    Procedure: LARYNGOSCOPY WITH BIOPSY;  Surgeon: Jesse James MD;  Location: [address];  Service: ENT;  Laterality: N/A;    MICROLARYNGOSCOPY N/A 3/17/2020    Procedure: MICROLARYNGOSCOPY;  Surgeon: Jung Xiao MD;  Location: [facility];  Service: ENT;  Laterality: N/A;  Laser Microlaryngoscopy  NEED TO SCHEDULE LASER from [facility] [id number]    PHARYNGECTOMY  11/9/2022    Procedure: TOTAL PHARYNGECTOMY;  Surgeon: Jesse James MD;  Location: [address];  Service: ENT;;    REIMPLANTATION OF PARATHYROID TISSUE N/A 1/6/2022    Procedure: REIMPLANTATION, PARATHYROID TISSUE;  Surgeon: Jesse James MD;  Location: [facility] OR [address] [place] [address];  Service: ENT;  Laterality: N/A;    SKIN SPLIT GRAFT Right 11/9/2022    Procedure: APPLICATION, GRAFT, SKIN, SPLIT-THICKNESS;  Surgeon: Jesse James MD;  Location: [facility] OR [address] [place] [address];  Service:  "ENT;  Laterality: Right;    THYROIDECTOMY  1/6/2022    Procedure: THYROIDECTOMY;  Surgeon: Jesse James MD;  Location: [facility] OR [address] [place] [address];  Service: ENT;;    TRACHEOSTOMY N/A 12/27/2021    Procedure: CREATION, TRACHEOSTOMY;  Surgeon: Flex Espinosa MD;  Location: [facility] OR [address] [place] [address];  Service: ENT;  Laterality: N/A;     Family History   Problem Relation Name Age of Onset    Abnormal EKG Mother Hayley     Diabetes Father Nicolás     Heart disease Father Nicolás     Hypertension Father Nicolás        Review of Systems   Constitutional:  Positive for fatigue. Negative for activity change and unexpected weight change.   HENT:  Negative for hearing loss, rhinorrhea and trouble swallowing. Hoarse voice: aphonia.   Eyes:  Negative for discharge and visual disturbance.   Respiratory:  Positive for cough. Negative for hemoptysis, chest tightness and wheezing.    Cardiovascular:  Negative for chest pain and palpitations.   Gastrointestinal:  Positive for heartburn. Negative for blood in stool, constipation, diarrhea and vomiting.   Endocrine: Positive for cold intolerance. Negative for polydipsia and polyuria.         Hypocalcemia and hypothyroidism.  Head and neck surgery.   Genitourinary:  Negative for difficulty urinating, hematuria and urgency.   Musculoskeletal:  Negative for arthralgias, joint swelling, myalgias and neck pain.   Neurological:  Negative for weakness and headaches.   Psychiatric/Behavioral:  Negative for confusion and dysphoric mood.          Objective:      Blood pressure 116/71, pulse (P) 79, temperature 97.9 °F (36.6 °C), height 5' 6" (1.676 m), weight 65.8 kg (145 lb). Body mass index is 23.4 kg/m².  Physical Exam  Constitutional:       General: He is not in acute distress.     Appearance: He is ill-appearing. He is not diaphoretic.      Comments: BMI is 23.89-communicates via a electronic tablet plate.   HENT:      Head: Normocephalic.        Comments: Tracheostomy tube noted in the neck.  Fitting " well.  No infection.  21.64     Mouth/Throat:      Pharynx: No posterior oropharyngeal erythema.        Comments: EXAMINATION OF ORAL CAVITY DIFFICULT BECAUSE OF TIGHT MASSETERS.  TOTAL GLOSSECTOMY STATE.  Eyes:      General: No scleral icterus.  Neck:      Vascular: No carotid bruit.   Cardiovascular:      Rate and Rhythm: Normal rate and regular rhythm.   Pulmonary:      Effort: Pulmonary effort is normal. No respiratory distress.      Breath sounds: Normal breath sounds. No wheezing or rhonchi.   Abdominal:      General: There is no distension.      Palpations: Abdomen is soft. There is no mass.      Tenderness: There is no abdominal tenderness.          Comments: Peg tube noted.   Musculoskeletal:         General: No swelling or tenderness.      Cervical back: No rigidity or tenderness.      Right lower leg: No edema.      Left lower leg: No edema.   Skin:     Coloration: Skin is not jaundiced or pale.      Findings: No erythema or lesion.   Neurological:      Mental Status: He is alert. Mental status is at baseline.   Psychiatric:         Behavior: Behavior normal.           Assessment:       Lab Visit on 07/19/2024   Component Date Value Ref Range Status    KHLOE 07/19/2024 Negative   Final    Hemoglobin A1C 07/19/2024 6.5 (H)  4.5 - 6.2 % Final    Estimated Avg Glucose 07/19/2024 140 (H)  68 - 131 mg/dL Final    Folate 07/19/2024 13.9  4.0 - 24.0 ng/mL Final    Vitamin B-12 07/19/2024 734  210 - 950 pg/mL Final   Admission on 06/28/2024, Discharged on 06/30/2024   Component Date Value Ref Range Status    Magnesium 06/28/2024 1.9  1.6 - 2.6 mg/dL Final    QRS Duration 06/28/2024 78  ms Final    [facility] QTC Calculation 06/28/2024 464  ms Final    WBC 06/28/2024 5.68  3.90 - 12.70 K/uL Final    RBC 06/28/2024 3.72 (L)  4.60 - 6.20 M/uL Final    Hemoglobin 06/28/2024 11.6 (L)  14.0 - 18.0 g/dL Final    Hematocrit 06/28/2024 35.3 (L)  40.0 - 54.0 % Final    MCV 06/28/2024 95  82 - 98 fL Final    MCH 06/28/2024 31.2 (H)   27.0 - 31.0 pg Final    MCHC 06/28/2024 32.9  32.0 - 36.0 g/dL Final    RDW 06/28/2024 12.8  11.5 - 14.5 % Final    Platelets 06/28/2024 149 (L)  150 - 450 K/uL Final    MPV 06/28/2024 11.8  9.2 - 12.9 fL Final    Immature Granulocytes 06/28/2024 1.6 (H)  0.0 - 0.5 % Final    Gran # (ANC) 06/28/2024 4.1  1.8 - 7.7 K/uL Final    Immature Grans (Abs) 06/28/2024 0.09 (H)  0.00 - 0.04 K/uL Final    Lymph # 06/28/2024 0.7 (L)  1.0 - 4.8 K/uL Final    Mono # 06/28/2024 0.5  0.3 - 1.0 K/uL Final    Eos # 06/28/2024 0.3  0.0 - 0.5 K/uL Final    Baso # 06/28/2024 0.05  0.00 - 0.20 K/uL Final    nRBC 06/28/2024 0  0 /100 WBC Final    Gran % 06/28/2024 71.3  38.0 - 73.0 % Final    Lymph % 06/28/2024 12.1 (L)  18.0 - 48.0 % Final    Mono % 06/28/2024 8.8  4.0 - 15.0 % Final    Eosinophil % 06/28/2024 5.3  0.0 - 8.0 % Final    Basophil % 06/28/2024 0.9  0.0 - 1.9 % Final    Differential Method 06/28/2024 Automated   Final    Sodium 06/28/2024 140  136 - 145 mmol/L Final    Potassium 06/28/2024 4.2  3.5 - 5.1 mmol/L Final    Chloride 06/28/2024 101  95 - 110 mmol/L Final    CO2 06/28/2024 27  23 - 29 mmol/L Final    Glucose 06/28/2024 132 (H)  70 - 110 mg/dL Final    BUN 06/28/2024 25 (H)  8 - 23 mg/dL Final    Creatinine 06/28/2024 1.1  0.5 - 1.4 mg/dL Final    Calcium 06/28/2024 6.1 (LL)  8.7 - 10.5 mg/dL Final    Total Protein 06/28/2024 7.4  6.0 - 8.4 g/dL Final    Albumin 06/28/2024 4.2  3.5 - 5.2 g/dL Final    Total Bilirubin 06/28/2024 0.9  0.1 - 1.0 mg/dL Final    Alkaline Phosphatase 06/28/2024 221 (H)  55 - 135 U/L Final    AST 06/28/2024 45 (H)  10 - 40 U/L Final    ALT 06/28/2024 54 (H)  10 - 44 U/L Final    eGFR 06/28/2024 >60.0  >60 mL/min/1.73 m^2 Final    Anion Gap 06/28/2024 12  8 - 16 mmol/L Final    Troponin I High Sensitivity 06/28/2024 7.2  0.0 - 14.9 pg/mL Final    BNP 06/28/2024 66  0 - 99 pg/mL Final    Ionized Calcium 06/28/2024 0.79 (L)  1.06 - 1.42 mmol/L Final    Ionized Calcium 06/28/2024 0.82 (L)   1.06 - 1.42 mmol/L Final    POC Glucose 06/28/2024 301 (H)  70 - 110 Final    Hemoglobin A1C 06/29/2024 6.8 (H)  4.5 - 6.2 % Final    Estimated Avg Glucose 06/29/2024 148 (H)  68 - 131 mg/dL Final    Sodium 06/29/2024 141  136 - 145 mmol/L Final    Potassium 06/29/2024 3.9  3.5 - 5.1 mmol/L Final    Chloride 06/29/2024 104  95 - 110 mmol/L Final    CO2 06/29/2024 28  23 - 29 mmol/L Final    Glucose 06/29/2024 94  70 - 110 mg/dL Final    BUN 06/29/2024 23  8 - 23 mg/dL Final    Creatinine 06/29/2024 1.1  0.5 - 1.4 mg/dL Final    Calcium 06/29/2024 6.6 (LL)  8.7 - 10.5 mg/dL Final    Total Protein 06/29/2024 6.8  6.0 - 8.4 g/dL Final    Albumin 06/29/2024 3.9  3.5 - 5.2 g/dL Final    Total Bilirubin 06/29/2024 0.8  0.1 - 1.0 mg/dL Final    Alkaline Phosphatase 06/29/2024 193 (H)  55 - 135 U/L Final    AST 06/29/2024 37  10 - 40 U/L Final    ALT 06/29/2024 49 (H)  10 - 44 U/L Final    eGFR 06/29/2024 >60.0  >60 mL/min/1.73 m^2 Final    Anion Gap 06/29/2024 9  8 - 16 mmol/L Final    Magnesium 06/29/2024 1.9  1.6 - 2.6 mg/dL Final    WBC 06/29/2024 5.73  3.90 - 12.70 K/uL Final    RBC 06/29/2024 3.52 (L)  4.60 - 6.20 M/uL Final    Hemoglobin 06/29/2024 10.9 (L)  14.0 - 18.0 g/dL Final    Hematocrit 06/29/2024 32.5 (L)  40.0 - 54.0 % Final    MCV 06/29/2024 92  82 - 98 fL Final    MCH 06/29/2024 31.0  27.0 - 31.0 pg Final    MCHC 06/29/2024 33.5  32.0 - 36.0 g/dL Final    RDW 06/29/2024 12.8  11.5 - 14.5 % Final    Platelets 06/29/2024 153  150 - 450 K/uL Final    MPV 06/29/2024 11.4  9.2 - 12.9 fL Final    Immature Granulocytes 06/29/2024 1.9 (H)  0.0 - 0.5 % Final    Gran # (ANC) 06/29/2024 3.9  1.8 - 7.7 K/uL Final    Immature Grans (Abs) 06/29/2024 0.11 (H)  0.00 - 0.04 K/uL Final    Lymph # 06/29/2024 0.8 (L)  1.0 - 4.8 K/uL Final    Mono # 06/29/2024 0.5  0.3 - 1.0 K/uL Final    Eos # 06/29/2024 0.4  0.0 - 0.5 K/uL Final    Baso # 06/29/2024 0.04  0.00 - 0.20 K/uL Final    nRBC 06/29/2024 0  0 /100 WBC Final    Gran  % 06/29/2024 68.7  38.0 - 73.0 % Final    Lymph % 06/29/2024 14.5 (L)  18.0 - 48.0 % Final    Mono % 06/29/2024 7.9  4.0 - 15.0 % Final    Eosinophil % 06/29/2024 6.3  0.0 - 8.0 % Final    Basophil % 06/29/2024 0.7  0.0 - 1.9 % Final    Differential Method 06/29/2024 Automated   Final    Ionized Calcium 06/29/2024 0.84 (L)  1.06 - 1.42 mmol/L Final    Ionized Calcium 06/29/2024 0.97 (L)  1.06 - 1.42 mmol/L Final    PTH, Intact 06/29/2024 12.8  9.0 - 77.0 pg/mL Final    POC Glucose 06/29/2024 133 (H)  70 - 110 Final    Ionized Calcium 06/29/2024 0.97 (L)  1.06 - 1.42 mmol/L Final    POC Glucose 06/29/2024 145 (H)  70 - 110 Final    Ionized Calcium 06/29/2024 1.06  1.06 - 1.42 mmol/L Final    Sodium 06/30/2024 139  136 - 145 mmol/L Final    Potassium 06/30/2024 4.3  3.5 - 5.1 mmol/L Final    Chloride 06/30/2024 104  95 - 110 mmol/L Final    CO2 06/30/2024 27  23 - 29 mmol/L Final    Glucose 06/30/2024 112 (H)  70 - 110 mg/dL Final    BUN 06/30/2024 21  8 - 23 mg/dL Final    Creatinine 06/30/2024 1.1  0.5 - 1.4 mg/dL Final    Calcium 06/30/2024 8.1 (L)  8.7 - 10.5 mg/dL Final    Total Protein 06/30/2024 6.7  6.0 - 8.4 g/dL Final    Albumin 06/30/2024 3.8  3.5 - 5.2 g/dL Final    Total Bilirubin 06/30/2024 0.9  0.1 - 1.0 mg/dL Final    Alkaline Phosphatase 06/30/2024 197 (H)  55 - 135 U/L Final    AST 06/30/2024 35  10 - 40 U/L Final    ALT 06/30/2024 46 (H)  10 - 44 U/L Final    eGFR 06/30/2024 >60.0  >60 mL/min/1.73 m^2 Final    Anion Gap 06/30/2024 8  8 - 16 mmol/L Final    Magnesium 06/30/2024 1.8  1.6 - 2.6 mg/dL Final    Ionized Calcium 06/30/2024 1.03 (L)  1.06 - 1.42 mmol/L Final    POC Glucose 06/30/2024 117 (H)  70 - 110 Final    POC Glucose 06/30/2024 157 (H)  70 - 110 Final    Ionized Calcium 06/30/2024 1.09  1.06 - 1.42 mmol/L Final   Lab Visit on 06/28/2024   Component Date Value Ref Range Status    WBC 06/28/2024 5.30  3.90 - 12.70 K/uL Final    RBC 06/28/2024 3.65 (L)  4.60 - 6.20 M/uL Final     Hemoglobin 06/28/2024 11.5 (L)  14.0 - 18.0 g/dL Final    Hematocrit 06/28/2024 34.6 (L)  40.0 - 54.0 % Final    MCV 06/28/2024 95  82 - 98 fL Final    MCH 06/28/2024 31.5 (H)  27.0 - 31.0 pg Final    MCHC 06/28/2024 33.2  32.0 - 36.0 g/dL Final    RDW 06/28/2024 12.8  11.5 - 14.5 % Final    Platelets 06/28/2024 159  150 - 450 K/uL Final    MPV 06/28/2024 11.9  9.2 - 12.9 fL Final    Immature Granulocytes 06/28/2024 1.5 (H)  0.0 - 0.5 % Final    Gran # (ANC) 06/28/2024 3.6  1.8 - 7.7 K/uL Final    Immature Grans (Abs) 06/28/2024 0.08 (H)  0.00 - 0.04 K/uL Final    Lymph # 06/28/2024 0.9 (L)  1.0 - 4.8 K/uL Final    Mono # 06/28/2024 0.4  0.3 - 1.0 K/uL Final    Eos # 06/28/2024 0.3  0.0 - 0.5 K/uL Final    Baso # 06/28/2024 0.02  0.00 - 0.20 K/uL Final    nRBC 06/28/2024 0  0 /100 WBC Final    Gran % 06/28/2024 68.5  38.0 - 73.0 % Final    Lymph % 06/28/2024 16.8 (L)  18.0 - 48.0 % Final    Mono % 06/28/2024 7.0  4.0 - 15.0 % Final    Eosinophil % 06/28/2024 5.8  0.0 - 8.0 % Final    Basophil % 06/28/2024 0.4  0.0 - 1.9 % Final    Differential Method 06/28/2024 Automated   Final    Sodium 06/28/2024 139  136 - 145 mmol/L Final    Potassium 06/28/2024 4.0  3.5 - 5.1 mmol/L Final    Chloride 06/28/2024 102  95 - 110 mmol/L Final    CO2 06/28/2024 28  23 - 29 mmol/L Final    Glucose 06/28/2024 139 (H)  70 - 110 mg/dL Final    BUN 06/28/2024 25 (H)  8 - 23 mg/dL Final    Creatinine 06/28/2024 1.1  0.5 - 1.4 mg/dL Final    Calcium 06/28/2024 6.2 (LL)  8.7 - 10.5 mg/dL Final    Total Protein 06/28/2024 7.1  6.0 - 8.4 g/dL Final    Albumin 06/28/2024 4.1  3.5 - 5.2 g/dL Final    Total Bilirubin 06/28/2024 0.9  0.1 - 1.0 mg/dL Final    Alkaline Phosphatase 06/28/2024 222 (H)  55 - 135 U/L Final    AST 06/28/2024 43 (H)  10 - 40 U/L Final    ALT 06/28/2024 53 (H)  10 - 44 U/L Final    eGFR 06/28/2024 >60.0  >60 mL/min/1.73 m^2 Final    Anion Gap 06/28/2024 9  8 - 16 mmol/L Final    Iron 06/28/2024 88  45 - 160 ug/dL Final     Transferrin 06/28/2024 197 (L)  200 - 375 mg/dL Final    TIBC 06/28/2024 276  250 - 450 ug/dL Final    Saturated Iron 06/28/2024 32  20 - 50 % Final    Ferritin 06/28/2024 770.9 (H)  20.0 - 300.0 ng/mL Final   Lab Visit on 05/17/2024   Component Date Value Ref Range Status    WBC 05/17/2024 4.85  3.90 - 12.70 K/uL Final    RBC 05/17/2024 4.00 (L)  4.60 - 6.20 M/uL Final    Hemoglobin 05/17/2024 12.8 (L)  14.0 - 18.0 g/dL Final    Hematocrit 05/17/2024 38.4 (L)  40.0 - 54.0 % Final    MCV 05/17/2024 96  82 - 98 fL Final    MCH 05/17/2024 32.0 (H)  27.0 - 31.0 pg Final    MCHC 05/17/2024 33.3  32.0 - 36.0 g/dL Final    RDW 05/17/2024 12.0  11.5 - 14.5 % Final    Platelets 05/17/2024 113 (L)  150 - 450 K/uL Final    MPV 05/17/2024 12.3  9.2 - 12.9 fL Final    Immature Granulocytes 05/17/2024 0.6 (H)  0.0 - 0.5 % Final    Gran # (ANC) 05/17/2024 3.5  1.8 - 7.7 K/uL Final    Immature Grans (Abs) 05/17/2024 0.03  0.00 - 0.04 K/uL Final    Lymph # 05/17/2024 0.8 (L)  1.0 - 4.8 K/uL Final    Mono # 05/17/2024 0.3  0.3 - 1.0 K/uL Final    Eos # 05/17/2024 0.2  0.0 - 0.5 K/uL Final    Baso # 05/17/2024 0.02  0.00 - 0.20 K/uL Final    nRBC 05/17/2024 0  0 /100 WBC Final    Gran % 05/17/2024 72.2  38.0 - 73.0 % Final    Lymph % 05/17/2024 15.5 (L)  18.0 - 48.0 % Final    Mono % 05/17/2024 6.4  4.0 - 15.0 % Final    Eosinophil % 05/17/2024 4.9  0.0 - 8.0 % Final    Basophil % 05/17/2024 0.4  0.0 - 1.9 % Final    Differential Method 05/17/2024 Automated   Final    Sodium 05/17/2024 139  136 - 145 mmol/L Final    Potassium 05/17/2024 4.4  3.5 - 5.1 mmol/L Final    Chloride 05/17/2024 102  95 - 110 mmol/L Final    CO2 05/17/2024 31 (H)  23 - 29 mmol/L Final    Glucose 05/17/2024 121 (H)  70 - 110 mg/dL Final    BUN 05/17/2024 29 (H)  8 - 23 mg/dL Final    Creatinine 05/17/2024 1.2  0.5 - 1.4 mg/dL Final    Calcium 05/17/2024 8.1 (L)  8.7 - 10.5 mg/dL Final    Total Protein 05/17/2024 7.0  6.0 - 8.4 g/dL Final    Albumin 05/17/2024  4.4  3.5 - 5.2 g/dL Final    Total Bilirubin 05/17/2024 1.1 (H)  0.1 - 1.0 mg/dL Final    Alkaline Phosphatase 05/17/2024 124  55 - 135 U/L Final    AST 05/17/2024 32  10 - 40 U/L Final    ALT 05/17/2024 35  10 - 44 U/L Final    eGFR 05/17/2024 >60.0  >60 mL/min/1.73 m^2 Final    Anion Gap 05/17/2024 6 (L)  8 - 16 mmol/L Final    Iron 05/17/2024 94  45 - 160 ug/dL Final    Transferrin 05/17/2024 208  200 - 375 mg/dL Final    TIBC 05/17/2024 291  250 - 450 ug/dL Final    Saturated Iron 05/17/2024 32  20 - 50 % Final    Ferritin 05/17/2024 244.0  20.0 - 300.0 ng/mL Final   Lab Visit on 05/13/2024   Component Date Value Ref Range Status    Sodium 05/13/2024 138  136 - 145 mmol/L Final    Potassium 05/13/2024 4.0  3.5 - 5.1 mmol/L Final    Chloride 05/13/2024 102  95 - 110 mmol/L Final    CO2 05/13/2024 27  23 - 29 mmol/L Final    Glucose 05/13/2024 193 (H)  70 - 110 mg/dL Final    BUN 05/13/2024 28 (H)  8 - 23 mg/dL Final    Creatinine 05/13/2024 1.2  0.5 - 1.4 mg/dL Final    Calcium 05/13/2024 7.8 (L)  8.7 - 10.5 mg/dL Final    Anion Gap 05/13/2024 9  8 - 16 mmol/L Final    eGFR 05/13/2024 >60.0  >60 mL/min/1.73 m^2 Final    PTH, Intact 05/13/2024 12.9  9.0 - 77.0 pg/mL Final    Vit D, 25-Hydroxy 05/13/2024 78  30 - 96 ng/mL Final       1. Cold intolerance  Comments:  feels cold at home.  Likes to wrap himself in a sweater in summer season.  Temperature 97.9° check thyroid    2. Cough, unspecified type  Comments:  Cause of cough uncertain.  Wants 2 or 3 bottles of codeine cough syrup.  Salt water gargles, steam inhalation and promethazine DM sparingly at night  Orders:  -     promethazine-dextromethorphan (PROMETHAZINE-DM) 6.25-15 mg/5 mL Syrp; Take 5 mLs by mouth every 12 (twelve) hours as needed (cough and cogestion).  Dispense: 300 mL; Refill: 5    3. Rhinorrhea  Comments:  Possibly some postnasal drip and irritation in throat.  Orders:  -     promethazine-dextromethorphan (PROMETHAZINE-DM) 6.25-15 mg/5 mL Syrp;  Take 5 mLs by mouth every 12 (twelve) hours as needed (cough and cogestion).  Dispense: 300 mL; Refill: 5    4. Larynx cancer  Comments:  History of larynx cancer status post surgery has been noted.  Overview:  9/16/20-10/30/20 radiation to larynx and bilateral necks  1/6/22 TL with bilateral neck dissection and total thyroidectomy    Orders:  -     promethazine-dextromethorphan (PROMETHAZINE-DM) 6.25-15 mg/5 mL Syrp; Take 5 mLs by mouth every 12 (twelve) hours as needed (cough and cogestion).  Dispense: 300 mL; Refill: 5    5. Hypocalcemia  Comments:  currently on vitamin D3 supplements and calcium carbonate liquid.  Not following up with Endocrine    6. Aphonia    7. Presence of externally removable percutaneous endoscopic gastrostomy (PEG) tube    8. Postablative hypothyroidism  -     T4, FREE; Future; Expected date: 07/29/2024  -     TSH; Future; Expected date: 07/30/2024           IMPRESSION:     1. Extensive postsurgical changes throughout the neck as described, with no evidence of residual or recurrent malignancy.  2. Negative for metastatic disease throughout the neck or the chest.  3. 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  4. Coronary artery calcifications.     Electronically signed by:  Nael Hui MD  9/21/2023 10:46 AM CDT Workstation: [id number]           Specimen Collected: 09/21/23 09:39 CDT Last Resulted: 09/21/23 10:46 CDT         Social history also has been reviewed.  He was couple of sisters who live in [place] and not closer.  His wife and family also keep an eye on him.    No children at this point.    Plan:   Cold intolerance  Comments:  feels cold at home.  Likes to wrap himself in a sweater in summer season.  Temperature 97.9° check thyroid    Cough, unspecified type  Comments:  Cause of cough uncertain.  Wants 2 or 3 bottles of codeine cough syrup.  Salt water gargles, steam inhalation and promethazine DM sparingly at night  Orders:  -      promethazine-dextromethorphan (PROMETHAZINE-DM) 6.25-15 mg/5 mL Syrp; Take 5 mLs by mouth every 12 (twelve) hours as needed (cough and cogestion).  Dispense: 300 mL; Refill: 5    Rhinorrhea  Comments:  Possibly some postnasal drip and irritation in throat.  Orders:  -     promethazine-dextromethorphan (PROMETHAZINE-DM) 6.25-15 mg/5 mL Syrp; Take 5 mLs by mouth every 12 (twelve) hours as needed (cough and cogestion).  Dispense: 300 mL; Refill: 5    Larynx cancer  Comments:  History of larynx cancer status post surgery has been noted.  Orders:  -     promethazine-dextromethorphan (PROMETHAZINE-DM) 6.25-15 mg/5 mL Syrp; Take 5 mLs by mouth every 12 (twelve) hours as needed (cough and cogestion).  Dispense: 300 mL; Refill: 5    Hypocalcemia  Comments:  currently on vitamin D3 supplements and calcium carbonate liquid.  Not following up with Endocrine    Aphonia    Presence of externally removable percutaneous endoscopic gastrostomy (PEG) tube    Postablative hypothyroidism  -     T4, FREE; Future; Expected date: 07/29/2024  -     TSH; Future; Expected date: 07/30/2024    Patient's medical conditions have been noted.  Calcium levels are okay.   He continues on vitamin-D supplements as well as calcium supplements.  Previous consultation from endocrinology had indicated calcitriol which I do not see on his listing     As far as cold intolerances concerned, he is not hypothermic at this point.  Temperature was checked through the axilla. He does not have any tongue to hold the seroma meter.    Is called intolerance could be multifactorial including general disposition, weight loss in past.  He does not have any fever or chills at this point to indicate an infectious process.  Temperature regulation house might be helpful though he cares or about his wife hot and they like to keep the temperature around 76° F.    Follow up with the specialist seems to be constricting at this point.    Elevated blood sugars have been noted and  I do not want to add the burden of another medication at this point.  His nutrition has changed from Jevity to Glucerna.  I will monitor another A1c and if it persists to be elevated, will consider treatment in that case.    He continues to have cough and agrees to take promethazine DM.   Thankfully it is not causing him much sedation and hopefully no narcotic effects.  He takes 1 tsp twice a day on a regular basis.  New prescription has been given subject to coverage though he was to pay out of his own pocket.      Stewardship of medical care remains an issue with reliability of family members to follow-up on him somewhat questionable.  No children.  He was a couple of sisters who live in [place].    He is trying to do the best at this point.    Continue with other issues like shingles vaccine, flu vaccination.    Other preventive care measures like colorectal screening are pending at this point.    Given his pre-existing medical conditions probably it might be overwhelming for him to diagnosed  withanother cancer and go through the rigor  off more diagnosis and treatment.  Hence colonoscopy should be deferred.     Overall prognosis at best remains modest and generally guarded.    Follow-up in 3-4 months.    Follow up in about 4 months (around 11/29/2024), or if symptoms worsen or fail to improve, for Thyroid, head and neck cancer, cold intolerance.  Spent brittany 34 minutes with patient which involved review of pts medical conditions, labs, medications and with 50% of time face-to-face discussion about medical problems, management and any applicable changes.  Component      Latest Ref Rng 7/29/2024   Free T4      0.71 - 1.51 ng/dL 0.87    TSH      0.340 - 5.600 uIU/mL 5.329          At this point, your thyroid tests are reasonably within range but we can increase your levothyroxine dosage to 125 mcg.  See if it helps you with a cold intolerance.  Let me know.  I did  get in touch with [facility]  and they will also follow-up with you.      Current Outpatient Medications:     acetaminophen (TYLENOL) 325 MG tablet, Take 325 mg by mouth every 6 (six) hours as needed for Pain., Disp: , Rfl:     calcium carbonate 500 mg/5 mL (1,250 mg/5 mL), Take 20 mls four times daily with meals and nightly., Disp: 2300 mL, Rfl: 12    cholecalciferol, vitamin D3, (VITAMIN D3) 10 mcg/mL (400 unit/mL) Drop, Take 2.5 mLs (1,000 Units total) by mouth once daily., Disp: 75 mL, Rfl: 2    diclofenac sodium (VOLTAREN ARTHRITIS PAIN) 1 % Gel, Apply 2 g topically once daily. Apply couple of times a day on the affected arthritis pain., Disp: 100 g, Rfl: 1    esomeprazole (NEXIUM) 40 MG capsule, 40 mg before breakfast., Disp: , Rfl:     famotidine (PEPCID) 40 mg/5 mL (8 mg/mL) suspension, Give 2.5 mLs (20 mg total) by Per G Tube route 2 (two) times daily., Disp: 50 mL, Rfl: 11    levothyroxine (SYNTHROID) 100 MCG tablet, 1 tablet (100 mcg total) by Per G Tube route before breakfast., Disp: 30 tablet, Rfl: 11    promethazine-dextromethorphan (PROMETHAZINE-DM) 6.25-15 mg/5 mL Syrp, Take 5 mLs by mouth every 12 (twelve) hours as needed (cough and cogestion)., Disp: 300 mL, Rfl: 5    Maico Patterson

## 2024-07-30 ENCOUNTER — PATIENT MESSAGE (OUTPATIENT)
Dept: FAMILY MEDICINE | Facility: CLINIC | Age: 74
End: 2024-07-30
Payer: MEDICARE

## 2024-07-30 NOTE — PROGRESS NOTES
At this point, your thyroid tests are reasonably within range but we can increase your levothyroxine dosage to 125 mcg.  See if it helps you with a cold intolerance.  Let me know.  I did  get in touch with [facility]  and they will also follow-up with you.

## 2024-08-05 ENCOUNTER — OFFICE VISIT (OUTPATIENT)
Dept: SURGICAL ONCOLOGY | Facility: CLINIC | Age: 74
End: 2024-08-05
Payer: MEDICARE

## 2024-08-05 VITALS — BODY MASS INDEX: 24.45 KG/M2 | WEIGHT: 152.13 LBS | HEIGHT: 66 IN

## 2024-08-05 DIAGNOSIS — C01 MALIGNANT NEOPLASM OF BASE OF TONGUE: Primary | ICD-10-CM

## 2024-08-05 DIAGNOSIS — C32.9 LARYNX CANCER: ICD-10-CM

## 2024-08-05 PROCEDURE — 1101F PT FALLS ASSESS-DOCD LE1/YR: CPT | Mod: CPTII,S$GLB,, | Performed by: OTOLARYNGOLOGY

## 2024-08-05 PROCEDURE — 1159F MED LIST DOCD IN RCRD: CPT | Mod: CPTII,S$GLB,, | Performed by: OTOLARYNGOLOGY

## 2024-08-05 PROCEDURE — 1157F ADVNC CARE PLAN IN RCRD: CPT | Mod: CPTII,S$GLB,, | Performed by: OTOLARYNGOLOGY

## 2024-08-05 PROCEDURE — 3044F HG A1C LEVEL LT 7.0%: CPT | Mod: CPTII,S$GLB,, | Performed by: OTOLARYNGOLOGY

## 2024-08-05 PROCEDURE — 99213 OFFICE O/P EST LOW 20 MIN: CPT | Mod: 25,S$GLB,, | Performed by: OTOLARYNGOLOGY

## 2024-08-05 PROCEDURE — 31575 DIAGNOSTIC LARYNGOSCOPY: CPT | Mod: S$GLB,,, | Performed by: OTOLARYNGOLOGY

## 2024-08-05 PROCEDURE — 3072F LOW RISK FOR RETINOPATHY: CPT | Mod: CPTII,S$GLB,, | Performed by: OTOLARYNGOLOGY

## 2024-08-05 PROCEDURE — 1160F RVW MEDS BY RX/DR IN RCRD: CPT | Mod: CPTII,S$GLB,, | Performed by: OTOLARYNGOLOGY

## 2024-08-05 PROCEDURE — 1126F AMNT PAIN NOTED NONE PRSNT: CPT | Mod: CPTII,S$GLB,, | Performed by: OTOLARYNGOLOGY

## 2024-08-05 PROCEDURE — 99999 PR PBB SHADOW E&M-EST. PATIENT-LVL III: CPT | Mod: PBBFAC,,, | Performed by: OTOLARYNGOLOGY

## 2024-08-05 PROCEDURE — 3008F BODY MASS INDEX DOCD: CPT | Mod: CPTII,S$GLB,, | Performed by: OTOLARYNGOLOGY

## 2024-08-05 PROCEDURE — 3288F FALL RISK ASSESSMENT DOCD: CPT | Mod: CPTII,S$GLB,, | Performed by: OTOLARYNGOLOGY

## 2024-08-06 ENCOUNTER — PATIENT MESSAGE (OUTPATIENT)
Dept: FAMILY MEDICINE | Facility: CLINIC | Age: 74
End: 2024-08-06
Payer: MEDICARE

## 2024-08-07 ENCOUNTER — PATIENT MESSAGE (OUTPATIENT)
Dept: OTOLARYNGOLOGY | Facility: CLINIC | Age: 74
End: 2024-08-07
Payer: MEDICARE

## 2024-08-07 ENCOUNTER — INFUSION (OUTPATIENT)
Dept: INFUSION THERAPY | Facility: HOSPITAL | Age: 74
End: 2024-08-07
Attending: INTERNAL MEDICINE
Payer: MEDICARE

## 2024-08-07 VITALS
BODY MASS INDEX: 24.08 KG/M2 | DIASTOLIC BLOOD PRESSURE: 67 MMHG | HEART RATE: 69 BPM | RESPIRATION RATE: 18 BRPM | TEMPERATURE: 98 F | HEIGHT: 66 IN | SYSTOLIC BLOOD PRESSURE: 122 MMHG | WEIGHT: 149.81 LBS

## 2024-08-07 DIAGNOSIS — C01 MALIGNANT NEOPLASM OF BASE OF TONGUE: ICD-10-CM

## 2024-08-07 DIAGNOSIS — E86.0 DEHYDRATION: ICD-10-CM

## 2024-08-07 DIAGNOSIS — C32.9 LARYNX CANCER: Primary | ICD-10-CM

## 2024-08-07 PROCEDURE — 96523 IRRIG DRUG DELIVERY DEVICE: CPT

## 2024-08-07 PROCEDURE — A4216 STERILE WATER/SALINE, 10 ML: HCPCS | Performed by: INTERNAL MEDICINE

## 2024-08-07 PROCEDURE — 63600175 PHARM REV CODE 636 W HCPCS: Performed by: INTERNAL MEDICINE

## 2024-08-07 PROCEDURE — 25000003 PHARM REV CODE 250: Performed by: INTERNAL MEDICINE

## 2024-08-07 RX ORDER — HEPARIN 100 UNIT/ML
500 SYRINGE INTRAVENOUS
Status: DISCONTINUED | OUTPATIENT
Start: 2024-08-07 | End: 2024-08-07 | Stop reason: HOSPADM

## 2024-08-07 RX ORDER — SODIUM CHLORIDE 0.9 % (FLUSH) 0.9 %
10 SYRINGE (ML) INJECTION
OUTPATIENT
Start: 2024-08-07

## 2024-08-07 RX ORDER — HEPARIN 100 UNIT/ML
500 SYRINGE INTRAVENOUS
OUTPATIENT
Start: 2024-08-07

## 2024-08-07 RX ORDER — SODIUM CHLORIDE 0.9 % (FLUSH) 0.9 %
10 SYRINGE (ML) INJECTION
Status: DISCONTINUED | OUTPATIENT
Start: 2024-08-07 | End: 2024-08-07 | Stop reason: HOSPADM

## 2024-08-07 RX ADMIN — SODIUM CHLORIDE, PRESERVATIVE FREE 10 ML: 5 INJECTION INTRAVENOUS at 12:08

## 2024-08-07 RX ADMIN — HEPARIN 500 UNITS: 100 SYRINGE at 12:08

## 2024-08-13 ENCOUNTER — TELEPHONE (OUTPATIENT)
Dept: HEMATOLOGY/ONCOLOGY | Facility: CLINIC | Age: 74
End: 2024-08-13
Payer: MEDICARE

## 2024-08-14 ENCOUNTER — LAB VISIT (OUTPATIENT)
Dept: LAB | Facility: HOSPITAL | Age: 74
End: 2024-08-14
Attending: INTERNAL MEDICINE
Payer: MEDICARE

## 2024-08-14 DIAGNOSIS — D50.9 IRON DEFICIENCY ANEMIA, UNSPECIFIED IRON DEFICIENCY ANEMIA TYPE: ICD-10-CM

## 2024-08-14 LAB
ALBUMIN SERPL BCP-MCNC: 4.2 G/DL (ref 3.5–5.2)
ALP SERPL-CCNC: 138 U/L (ref 55–135)
ALT SERPL W/O P-5'-P-CCNC: 36 U/L (ref 10–44)
ANION GAP SERPL CALC-SCNC: 8 MMOL/L (ref 8–16)
AST SERPL-CCNC: 43 U/L (ref 10–40)
BASOPHILS # BLD AUTO: 0.02 K/UL (ref 0–0.2)
BASOPHILS NFR BLD: 0.3 % (ref 0–1.9)
BILIRUB SERPL-MCNC: 1.2 MG/DL (ref 0.1–1)
BUN SERPL-MCNC: 32 MG/DL (ref 8–23)
CALCIUM SERPL-MCNC: 9.1 MG/DL (ref 8.7–10.5)
CHLORIDE SERPL-SCNC: 97 MMOL/L (ref 95–110)
CO2 SERPL-SCNC: 31 MMOL/L (ref 23–29)
CREAT SERPL-MCNC: 1.2 MG/DL (ref 0.5–1.4)
DIFFERENTIAL METHOD BLD: ABNORMAL
EOSINOPHIL # BLD AUTO: 0.3 K/UL (ref 0–0.5)
EOSINOPHIL NFR BLD: 4.9 % (ref 0–8)
ERYTHROCYTE [DISTWIDTH] IN BLOOD BY AUTOMATED COUNT: 13.2 % (ref 11.5–14.5)
EST. GFR  (NO RACE VARIABLE): >60 ML/MIN/1.73 M^2
FERRITIN SERPL-MCNC: 173.2 NG/ML (ref 20–300)
GLUCOSE SERPL-MCNC: 108 MG/DL (ref 70–110)
HCT VFR BLD AUTO: 36.6 % (ref 40–54)
HGB BLD-MCNC: 12.3 G/DL (ref 14–18)
IMM GRANULOCYTES # BLD AUTO: 0.05 K/UL (ref 0–0.04)
IMM GRANULOCYTES NFR BLD AUTO: 0.8 % (ref 0–0.5)
IRON SERPL-MCNC: 78 UG/DL (ref 45–160)
LYMPHOCYTES # BLD AUTO: 0.8 K/UL (ref 1–4.8)
LYMPHOCYTES NFR BLD: 13.4 % (ref 18–48)
MCH RBC QN AUTO: 31.4 PG (ref 27–31)
MCHC RBC AUTO-ENTMCNC: 33.6 G/DL (ref 32–36)
MCV RBC AUTO: 93 FL (ref 82–98)
MONOCYTES # BLD AUTO: 0.5 K/UL (ref 0.3–1)
MONOCYTES NFR BLD: 7.6 % (ref 4–15)
NEUTROPHILS # BLD AUTO: 4.3 K/UL (ref 1.8–7.7)
NEUTROPHILS NFR BLD: 73 % (ref 38–73)
NRBC BLD-RTO: 0 /100 WBC
PLATELET # BLD AUTO: 116 K/UL (ref 150–450)
PMV BLD AUTO: 12.5 FL (ref 9.2–12.9)
POTASSIUM SERPL-SCNC: 3.7 MMOL/L (ref 3.5–5.1)
PROT SERPL-MCNC: 7 G/DL (ref 6–8.4)
RBC # BLD AUTO: 3.92 M/UL (ref 4.6–6.2)
SATURATED IRON: 25 % (ref 20–50)
SODIUM SERPL-SCNC: 136 MMOL/L (ref 136–145)
TOTAL IRON BINDING CAPACITY: 308 UG/DL (ref 250–450)
TRANSFERRIN SERPL-MCNC: 220 MG/DL (ref 200–375)
WBC # BLD AUTO: 5.91 K/UL (ref 3.9–12.7)

## 2024-08-14 PROCEDURE — 80053 COMPREHEN METABOLIC PANEL: CPT | Performed by: INTERNAL MEDICINE

## 2024-08-14 PROCEDURE — 85025 COMPLETE CBC W/AUTO DIFF WBC: CPT | Performed by: INTERNAL MEDICINE

## 2024-08-14 PROCEDURE — 36415 COLL VENOUS BLD VENIPUNCTURE: CPT | Performed by: INTERNAL MEDICINE

## 2024-08-14 PROCEDURE — 83540 ASSAY OF IRON: CPT | Performed by: INTERNAL MEDICINE

## 2024-08-14 PROCEDURE — 82728 ASSAY OF FERRITIN: CPT | Performed by: INTERNAL MEDICINE

## 2024-08-20 ENCOUNTER — OFFICE VISIT (OUTPATIENT)
Facility: CLINIC | Age: 74
End: 2024-08-20
Payer: MEDICARE

## 2024-08-20 VITALS
RESPIRATION RATE: 17 BRPM | SYSTOLIC BLOOD PRESSURE: 123 MMHG | DIASTOLIC BLOOD PRESSURE: 75 MMHG | WEIGHT: 149.38 LBS | TEMPERATURE: 98 F | HEIGHT: 66 IN | BODY MASS INDEX: 24.01 KG/M2 | HEART RATE: 71 BPM

## 2024-08-20 DIAGNOSIS — Z93.0 TRACHEOSTOMY IN PLACE: Primary | ICD-10-CM

## 2024-08-20 DIAGNOSIS — C01 MALIGNANT NEOPLASM OF BASE OF TONGUE: ICD-10-CM

## 2024-08-20 DIAGNOSIS — G89.3 CANCER RELATED PAIN: ICD-10-CM

## 2024-08-20 DIAGNOSIS — C32.9 LARYNGEAL CANCER: ICD-10-CM

## 2024-08-20 DIAGNOSIS — D50.0 IRON DEFICIENCY ANEMIA DUE TO CHRONIC BLOOD LOSS: ICD-10-CM

## 2024-08-20 DIAGNOSIS — D70.1 CHEMOTHERAPY-INDUCED NEUTROPENIA: ICD-10-CM

## 2024-08-20 DIAGNOSIS — C79.89 SECONDARY MALIGNANT NEOPLASM OF OTHER SPECIFIED SITES: ICD-10-CM

## 2024-08-20 DIAGNOSIS — E53.8 VITAMIN B12 DEFICIENCY: ICD-10-CM

## 2024-08-20 DIAGNOSIS — Z90.02 S/P LARYNGECTOMY: ICD-10-CM

## 2024-08-20 DIAGNOSIS — T45.1X5A CHEMOTHERAPY-INDUCED NEUTROPENIA: ICD-10-CM

## 2024-08-20 DIAGNOSIS — C32.9 LARYNX CANCER: ICD-10-CM

## 2024-08-20 DIAGNOSIS — R93.89 ABNORMAL CT SCAN, NECK: ICD-10-CM

## 2024-08-20 DIAGNOSIS — D50.9 IRON DEFICIENCY ANEMIA, UNSPECIFIED IRON DEFICIENCY ANEMIA TYPE: ICD-10-CM

## 2024-08-20 PROCEDURE — 3072F LOW RISK FOR RETINOPATHY: CPT | Mod: CPTII,S$GLB,, | Performed by: INTERNAL MEDICINE

## 2024-08-20 PROCEDURE — 1157F ADVNC CARE PLAN IN RCRD: CPT | Mod: CPTII,S$GLB,, | Performed by: INTERNAL MEDICINE

## 2024-08-20 PROCEDURE — 3008F BODY MASS INDEX DOCD: CPT | Mod: CPTII,S$GLB,, | Performed by: INTERNAL MEDICINE

## 2024-08-20 PROCEDURE — 1101F PT FALLS ASSESS-DOCD LE1/YR: CPT | Mod: CPTII,S$GLB,, | Performed by: INTERNAL MEDICINE

## 2024-08-20 PROCEDURE — 3078F DIAST BP <80 MM HG: CPT | Mod: CPTII,S$GLB,, | Performed by: INTERNAL MEDICINE

## 2024-08-20 PROCEDURE — 99999 PR PBB SHADOW E&M-EST. PATIENT-LVL III: CPT | Mod: PBBFAC,,, | Performed by: INTERNAL MEDICINE

## 2024-08-20 PROCEDURE — 99215 OFFICE O/P EST HI 40 MIN: CPT | Mod: S$GLB,,, | Performed by: INTERNAL MEDICINE

## 2024-08-20 PROCEDURE — G2211 COMPLEX E/M VISIT ADD ON: HCPCS | Mod: S$GLB,,, | Performed by: INTERNAL MEDICINE

## 2024-08-20 PROCEDURE — 1159F MED LIST DOCD IN RCRD: CPT | Mod: CPTII,S$GLB,, | Performed by: INTERNAL MEDICINE

## 2024-08-20 PROCEDURE — 1126F AMNT PAIN NOTED NONE PRSNT: CPT | Mod: CPTII,S$GLB,, | Performed by: INTERNAL MEDICINE

## 2024-08-20 PROCEDURE — 3288F FALL RISK ASSESSMENT DOCD: CPT | Mod: CPTII,S$GLB,, | Performed by: INTERNAL MEDICINE

## 2024-08-20 PROCEDURE — 3074F SYST BP LT 130 MM HG: CPT | Mod: CPTII,S$GLB,, | Performed by: INTERNAL MEDICINE

## 2024-08-20 PROCEDURE — 1160F RVW MEDS BY RX/DR IN RCRD: CPT | Mod: CPTII,S$GLB,, | Performed by: INTERNAL MEDICINE

## 2024-08-20 PROCEDURE — 3044F HG A1C LEVEL LT 7.0%: CPT | Mod: CPTII,S$GLB,, | Performed by: INTERNAL MEDICINE

## 2024-08-20 NOTE — PROGRESS NOTES
[facility] Hematology/Oncology  PROGRESS NOTE -  Follow-up Visit      Subjective:       Patient ID:   NAME: Cyrus Batres Jr. : 1950     74 y.o. 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  The patient is here today to go over the results of the recently ordered labs, tests and studies. He is here by himself.      He previously had had completed chemotherapy and  XRT.      He saw Dr James with ENT again 2024 with DL with good report per patient. He sees him again on       He saw Dr Patterson on 2024; Dena Silveira NP with endocrine in 2024    He had PEt on 2024 and is due for repeat CT's    He is doing well and is active at home.      He is breathing ok. 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 He had his portacath removed on  and replaced on 2023 by Dr Le            Discussed covid19 and he has been vaccinated      ROS:   GEN: normal without any fever, night sweats or weight loss; doing well with tube feeds   HEENT: normal with no HA's, sore throat, stiff neck, changes in vision  CV: normal with no CP, SOB, PND, no currentDOE  PULM: normal with no SOB, cough, hemoptysis, sputum or pleuritic pain  GI: no current N/V  ; has peg tube;    : normal with no hematuria, dysuria  BREAST: normal with no mass,  discharge, pain  SKIN: normal with no rash, erythema, bruising, or swelling     Pain Scale:  0    Allergies:  Review of patient's allergies indicates:   Allergen Reactions    Lovastatin Itching    Pollen extracts     Lovastatin Rash     Not confirmed but pt skeptical       Medications:    Current Outpatient Medications:     acetaminophen (TYLENOL) 325 MG tablet, Take 325 mg by mouth every 6 (six) hours as needed for Pain., Disp: , Rfl:     calcium carbonate 500 mg/5 mL (1,250 mg/5 mL), Take 20 mls four times daily with meals and nightly., Disp: 2300 mL, Rfl: 12    cholecalciferol, vitamin D3, (VITAMIN D3) 10 mcg/mL (400 unit/mL) Drop, Take 2.5 mLs (1,000 Units total) by mouth once daily., Disp: 75 mL, Rfl: 2    diclofenac sodium (VOLTAREN ARTHRITIS PAIN) 1 % Gel, Apply 2 g topically once daily. Apply couple of times a day on the affected arthritis pain., Disp: 100 g, Rfl: 1    esomeprazole (NEXIUM) 40 MG capsule, 40 mg before breakfast., Disp: , Rfl:     levothyroxine (SYNTHROID) 100 MCG tablet, 1 tablet (100 mcg total) by Per G Tube route before breakfast., Disp: 30 tablet, Rfl: 11    promethazine-dextromethorphan (PROMETHAZINE-DM) 6.25-15 mg/5 mL Syrp, Take 5 mLs by mouth every 12 (twelve) hours as needed (cough and cogestion)., Disp: 300 mL, Rfl: 5    famotidine (PEPCID) 40 mg/5 mL (8 mg/mL) suspension, Give 2.5 mLs (20 mg total) by Per G Tube route 2 (two) times daily., Disp: 50 mL, Rfl: 11    PMHx/PSHx Updates:  See patient's last visit with me on 5/20/2024  See H&P on 9/23/2022        Pathology:   Cancer Staging   Larynx cancer  Staging form: Larynx - Glottis, AJCC 8th Edition  - Clinical stage from 8/6/2020: Stage II (cT2, cN0, cM0) - Signed by Fariha Muñoz NP on 8/6/2020  - Pathologic stage from 1/20/2022: Stage LOU (pT4a, pN0, cM0) - Signed by Fariha Muñoz NP on 1/20/2022  Staging form: Pharynx - P16 Negative Oropharynx, AJCC 8th Edition  - Clinical: Stage II (rcT2, cN0, cM0) - Signed by Bandar  "Matheus Herbert MD on 5/30/2022    Malignant neoplasm of base of tongue  Staging form: Pharynx - P16 Negative Oropharynx, AJCC 8th Edition  - Clinical stage from 5/20/2022: Stage II (cT2, cN0, cM0, p16-) - Signed by Saúl Kessler MD on 7/7/2022    Dissection 11/9/2022:    Final Pathologic Diagnosis 1. "left submandibular lymph node", dissection:       - Three lymph nodes, negative for carcinoma (0/3)   2. 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       - Tumor size: 4.5 cm       - Resection margins: left superior pharyngeal margin focally involved;   all other margins are negative for carcinoma       - Left internal jugular vein: free of tumor       - One lymph node, negative for carcinoma (0/1)   Note: P16 immunostain is negative     Tumor Site       Oropharynx  involving Base of tongue       Tumor Laterality       Midline       Tumor Size       Greatest Dimension (Centimeters) 4.5 cm         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)       Histologic Grade       G2 to G3: Moderate to Poorly differentiated       Lymphovascular Invasion       Not identified       Perineural Invasion       Not identified     MARGINS      Margins       Involved by invasive tumor     Margin(s)   Involved by Invasive Tumor:      left superior pharyngeal margin; all other   margins are free of tumor     LYMPH NODES    Regional Lymph Node Status:    :     Regional Lymph Node   Status:      All regional lymph nodes negative for tumor      pT Category:               pT3   pN Category:               pN0      Base of Tongue Biopsy  4/27/2022:  Final Pathologic Diagnosis BIOPSY OF BASE OF THE TONGUE:   THE INFILTRATING POORLY DIFFERENTIATED SQUAMOUS CELL CARCINOMA   THE TUMOR IS P16 NEGATIVE.  THE POSITIVE AND NEGATIVE CONTROLS STAINED   APPROPRIATELY      Larynx resection/thyroidectomy 1/6/2022:     Final Pathologic " Diagnosis 1. Lymph nodes, left neck levels 2,  3 and 4, dissection:   - Nineteen lymph nodes, all  negative  for metastatic carcinoma (0/19)   2. Lymph nodes, right neck levels 2,  3 and 4, dissection:   - Twenty-eight lymph nodes, all  negative  for metastatic carcinoma (0/28)   3. Possible parathyroid gland, excision:   - Benign parathyroid gland tissue (0.003 g)   4. 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 Soft tissue, posterior tracheal margin, excision:   - Benign, focally reactive respiratory mucosa with submucosal acute   inflammation,  negative  for dysplasia or malignancy   6. Soft tissue, anterior tracheal margin, excision:   - Benign respiratory mucosa with subepithelial cartilage,  negative  for   dysplasia or malignancy   7. Soft tissue, posterior tracheal margin #2, excision:   - Benign, focally reactive respiratory mucosa with submucosal acute   inflammation,  negative  for dysplasia or malignancy   8. Soft tissue, anterior tracheal margin #2, excision:   - Benign, reactive focally ulcerated respiratory mucosa with submucosal acute   inflammation,  negative  for dysplasia or malignancy             Laryngoscope 8/4/2020: (with Dr Noel):  Final Pathologic Diagnosis 1.  Left true vocal fold, biopsy:       -  Invasive moderately differentiated squamous cell carcinoma,   keratinizing type   2.  Left true vocal fold, biopsy:       -  Invasive moderately differentiated squamous cell carcinoma,   keratinizing type             Laryngoscope  "3/17/2020 (with Dr Xiao):  Final Pathologic Diagnosis 1.  BIOPSY OF LEFT TRUE VOCAL CORD:   SEVERELY DYSPLASTIC APPEARING SQUAMOUS MUCOSA   INVASIVE CARCINOMA IS NOT DOCUMENTED   ON THE OTHER HAND, THESE FRAGMENTS ARE NODUL AND WITHOUT SUBMUCOSA FOR   EVALUATION; IT IS POSSIBLE THAT THEY REFLECT INVASIVE SQUAMOUS CARCINOMA   2.  BIOPSY OF LEFT ANTERIOR COMMISSURE:   MODERATE DYSPLASIA   NO INVASIVE CARCINOMA IDENTIFIED             Objective:     Vitals:  Blood pressure 123/75, pulse 71, temperature 97.6 °F (36.4 °C), resp. rate 17, height 5' 6" (1.676 m), weight 67.8 kg (149 lb 6.4 oz).    Physical Examination:   GEN: no apparent distress, comfortable; AAOx3  HEAD: atraumatic and normocephalic  EYES: no pallor, no icterus, PERRLA  ENT: OMM, no pharyngeal erythema, external ears WNL; no nasal discharge; no thrush; s/p laryngectomy with flap since healed well; trach   NECK: no masses, thyroid normal, trachea midline, no LAD/LN's, supple; post-op dissection healed well;    CV: RRR with no murmur; normal pulse; normal S1 and S2; no pedal edema; portacath   CHEST: Normal respiratory effort; CTAB; normal breath sounds; no wheeze or crackles  ABDOM: nontender and nondistended; soft; normal bowel sounds; no rebound/guarding; peg tube  MUSC/Skeletal: ROM normal; no crepitus; joints normal; no deformities or arthropathy  EXTREM: no clubbing, cyanosis, inflammation or swelling  SKIN: no rashes, lesions, ulcers, petechiae or subcutaneous nodules  : no mahan  NEURO: grossly intact; motor/sensory WNL; AAOx3; no tremors  PSYCH: normal mood, affect and behavior  LYMPH: normal cervical, supraclavicular, axillary and groin LN's          Labs:     Lab Results   Component Value Date    WBC 5.91 08/14/2024    HGB 12.3 (L) 08/14/2024    HCT 36.6 (L) 08/14/2024    MCV 93 08/14/2024     (L) 08/14/2024     CMP  Sodium   Date Value Ref Range Status   08/14/2024 136 136 - 145 mmol/L Final     Potassium   Date Value Ref Range Status "   08/14/2024 3.7 3.5 - 5.1 mmol/L Final     Chloride   Date Value Ref Range Status   08/14/2024 97 95 - 110 mmol/L Final     CO2   Date Value Ref Range Status   08/14/2024 31 (H) 23 - 29 mmol/L Final     Glucose   Date Value Ref Range Status   08/14/2024 108 70 - 110 mg/dL Final     BUN   Date Value Ref Range Status   08/14/2024 32 (H) 8 - 23 mg/dL Final     Creatinine   Date Value Ref Range Status   08/14/2024 1.2 0.5 - 1.4 mg/dL Final     Calcium   Date Value Ref Range Status   08/14/2024 9.1 8.7 - 10.5 mg/dL Final     Total Protein   Date Value Ref Range Status   08/14/2024 7.0 6.0 - 8.4 g/dL Final     Albumin   Date Value Ref Range Status   08/14/2024 4.2 3.5 - 5.2 g/dL Final   11/18/2020 3.7 3.6 - 5.1 g/dL Final     Comment:     For additional information, please refer to   http://education.lifecake/faq/XHK786 (This link is   being provided for informational/ educational purposes only.)  This test was developed and its analytical performance   characteristics have been determined by [facility]. It has not been cleared or approved by the   [place] Food and Drug Administration. This assay has been validated   pursuant to the CLIA regulations and is used for clinical   purposes.  @ Test Performed By:  [employer] [facility]  Yo Cortes M.D.,   [address] [place] [id number]  [place]  [id number]       Total Bilirubin   Date Value Ref Range Status   08/14/2024 1.2 (H) 0.1 - 1.0 mg/dL Final     Comment:     For infants and newborns, interpretation of results should be based  on gestational age, weight and in agreement with clinical  observations.    Premature Infant recommended reference ranges:  Up to 24 hours.............<8.0 mg/dL  Up to 48 hours............<12.0 mg/dL  3-5 days..................<15.0 mg/dL  6-29 days.................<15.0 mg/dL       Alkaline Phosphatase   Date Value Ref Range Status   08/14/2024 138 (H) 55  - 135 U/L Final     AST   Date Value Ref Range Status   08/14/2024 43 (H) 10 - 40 U/L Final     ALT   Date Value Ref Range Status   08/14/2024 36 10 - 44 U/L Final     Anion Gap   Date Value Ref Range Status   08/14/2024 8 8 - 16 mmol/L Final     eGFR if    Date Value Ref Range Status   07/29/2022 >60.0 >60 mL/min/1.73 m^2 Final     eGFR if non    Date Value Ref Range Status   07/29/2022 >60.0 >60 mL/min/1.73 m^2 Final     Comment:     Calculation used to obtain the estimated glomerular filtration  rate (eGFR) is the CKD-EPI equation.                   Radiology/Diagnostic Studies:      PET 2/7/2024:  IMPRESSION:     Extensive postoperative changes of the neck as outlined above, stable compared to prior PET/CT.  No convincing evidence of residual/recurrent FDG avid malignancy.     Mild FDG avidity of the proximal thoracic esophagus near the operative site, decreased compared to prior.     FDG avidity of arthritic right sternoclavicular joint          Ct Chest/Neck 9/21/2023:    IMPRESSION:     1. Extensive postsurgical changes throughout the neck as described, with no evidence of residual or recurrent malignancy.  2. Negative for metastatic disease throughout the neck or the chest.  3. 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  4. Coronary artery calcifications.        PET 5/30/2023:    IMPRESSION: Postsurgical changes from total glossectomy, laryngectomy and pharyngectomy with bilateral neck dissections     There is resolution of the previously noted fluid collections within the neck. There is mild FDG activity in the left side the neck adjacent to the neoesophagus as well as at the tracheostomy site to the right of the neoesophagus. Findings most likely are secondary to postsurgical and postinfection changes. There is no focal mass or adenopathy     No evidence of distant metastasis             CTA Neck    Result Date: 9/20/2022  EXAMINATION: CTA NECK CLINICAL  HISTORY: Head/neck cancer, monitor;Malignant neoplasm of base of tongue TECHNIQUE: CT angiogram was performed from the level of the scott to the EAC following the IV administration of 75mL of Omnipaque 350.   Sagittal and coronal reconstructions and maximum intensity projection reconstructions were performed. Arterial stenosis percentages are based on NASCET measurement criteria. COMPARISON: CTA neck dated 05/20/2022 FINDINGS: Aortic arch and great vessels: There is a conventional left-sided 3 vessel arch.  The aortic arch is widely patent.  There is stable soft and calcified plaque in the proximal left subclavian artery with a similar appearance the prior study and possibly within radiation field but without critical stenosis or occlusion.  The right subclavian artery is patent.  The brachiocephalic trunk and origin of the left common carotid artery are patent. Carotid arteries Right carotid artery: There is calcified plaque scattered in the right common carotid artery without critical stenosis, occlusion or change.  There is no measurable stenosis of the internal carotid artery which is patent to the skull base. Left carotid artery: There is mild plaque scattered in the left common carotid artery without change and without critical stenosis or occlusion.  There is no measurable stenosis of the internal carotid artery. Vertebral arteries: The left vertebral artery is dominant and patent throughout its course in the neck.  The right vertebral artery is hypoplastic but patent.  There is no critical stenosis, occlusion, thrombus or dissection.  There is no change. The study extends intracranially.  There is calcified plaque in the V4 segment of the left vertebral artery resulting in a moderate to marked short segment nonocclusive stenosis.  The right vertebral artery partially terminates in a posteroinferior cerebellar artery with a short segment moderate to marked stenosis of the very distal right vertebral artery.   Some flow within the distal right vertebral artery may represent retrograde flow from the dominant left vertebral artery.  The basilar artery is patent.  The origins of the superior cerebellar and posterior cerebral artery on the right are patent.  There is fetal origin of the left posterior cerebral artery which is patent. There is plaque in the cavernous segments of both internal carotid arteries but there is no critical stenosis or large vessel occlusion of the anterior circulation vessels.  There is no large aneurysm. Other: There is a similar appearance of the included upper lungs with pleural and parenchymal changes anteriorly in the upper lobes bilaterally.  As previously discussed, timing of the contrast bolus is performed during the arterial phase for CTA neck.  Therefore, enhancement of the soft tissues is somewhat suboptimal due to this technique. There has been a large region of soft tissue resection in the anterior mid and lower neck soft tissues with continued open defect in the upper chest and lower neck above the manubrium.  Soft tissue seen along the left posterolateral border of the trachea could represent secretions or mucus.  This was present previously. Redemonstrated is a large heterogeneously enhancing lesion centered at the level of the tongue base and floor of the mouth centrally into the left.  There is scattered calcifications.  The prior CTA demonstrated regions of central necrosis which are no longer definitely present but diffusely heterogeneous density and enhancement likely represents regions of necrosis.  This large heterogeneously enhancing soft tissue mass extends more anteriorly in the floor of the mouth on the left and measures at least 5.6 cm in greatest anterior to posterior dimension with 3.6 cm transverse dimension.  This does extend anteriorly to the medial margin of the body of the mandible on the left. There are post treatment changes with stranding in the neck fat.     1.  Similar appearance of the neck vessels when compared to the prior CTA neck with mild narrowing at the origin of the left subclavian artery.  There is no critical stenosis, occlusion, thrombosis or dissection involving the cervical vertebral or carotid arteries. 2. Larger mass within the hypopharynx and tongue base extending anteriorly to the floor of the mouth on the left to the medial margin of the body of the mandible without obvious bony destruction. 3. There is nonocclusive stenosis of the distal V4 segment of the left vertebral artery without change. 4. There is no large vessel occlusion or critical stenosis of the anterior circulation. 5. There is developmental variation with fetal origin of the left posterior cerebral artery. Electronically signed by: Rex Joyner MD Date:    09/20/2022 Time:    11:31    CT Abdomen Pelvis With Contrast    Result Date: 9/1/2022  CMS MANDATED QUALITY DATA - CT RADIATION - 436 All CT scans at this facility utilize dose modulation, iterative reconstruction, and/or weight based dosing when appropriate to reduce radiation dose to as low as reasonably achievable. Reason: abdominal pain partial history of laryngeal carcinoma TECHNIQUE: CT abdomen and pelvis with 100 mL Omnipaque 350. COMPARISON: PET/CT 5/13/2022 CT ABDOMEN: Coronary artery calcification and extremely tiny hiatal hernia incidentally noted. Visualized lung bases are clear. Liver, gallbladder, pancreas, spleen, adrenals, and kidneys are normal. Mild aortoiliac calcifications present. Gastrostomy tube tip lies in distal gastric body. Small intestines are unremarkable. Mild wall thickening affects the ascending colon with pneumatosis intestinalis diffusely affecting the ascending colon. No other free intraperitoneal gas or, and remainder of colon is normal. A normal appendix is present. The major mesenteric vascular structures are patent. No acute osseous abnormality. CT PELVIS: Prostate is slightly enlarged. Bladder  is normal. No free pelvic fluid. Tiny right and small to moderate left fat-containing inguinal hernias are present. No acute osseous abnormality. IMPRESSION: 1. Pneumatosis intestinalis affecting the ascending colon with associated mild wall thickening. Etiology of this is undetermined. Potential considerations include intestinal ischemia or pneumatosis related to chemotherapy. Clinical and laboratory correlation is needed to assess significance. No portal venous gas or free intraperitoneal air however. 2. Coronary artery calcifications Electronically signed by:  Nael Hui MD  9/1/2022 6:20 PM CDT Workstation: [id number]    NM PET CT Routine Skull to Mid Thigh    Result Date: 9/27/2022  PET CT WITH IMAGE FUSION HISTORY:  restage tongue cancer RESTAGING...  HX: TONGUE CA.  DX: April 2022.  LAST CHEMO TREATMENT WAS 3WKS AGO.  EVALUATE TX RESPONSE.   ALSO HX OF LARYNGEAL CA IN AUGUST 2020.  MULTIPLE SURGERIES: LARYNGECTOMY, THYROIDECTOMY & TRACHEOSTOMY.  RAD TX WAS COMPLETED IN NOV 2020. TECHNIQUE: Following IV administration of 11.2 mCi of F-18 labeled FDG into right antecubital fossa and a 60 minute delay, PET CT was performed from the vertex of the skull through the proximal thighs with an integrated PET CT scanner with image fusion. CT images were obtained to aid in attenuation correction and PET localization. The patient's serum glucose at the time of the exam was 136 mg/dL. COMPARISON: PET/CT 5/13/2022 FINDINGS: Poorly characterized soft tissue mass involving base of tongue approximately measures 4.3 x 2.8 cm (series 3 image 65) appearing similar to the prior exam. This reaches current max SUV of 11.8, not significantly changed from prior of 11.4. No other abnormal FDG activity. Intracranial compartment is unremarkable. Trace bilateral maxillary sinus mucosal thickening is present. Postoperative changes of laryngectomy and tracheostomy are present. Surgical clips occur throughout the neck. No definite  enlarged cervical or supraclavicular lymph node, with evaluation limited by lack of IV contrast. Left subclavian port catheter tip terminates in SVC. Diffuse coronary artery calcifications are present. No enlarged mediastinal or axillary lymph nodes. No pulmonary nodule or mass. Gastrostomy tube tip lies in gastric body. Moderate aortoiliac calcifications are present. Small fat-containing left inguinal hernia incidentally noted. Mild degenerative changes affect the spine. No suspicious osseous abnormality. IMPRESSION: 1. No significant change in the FDG avid mass involving the tongue base, remaining characteristic of malignancy. 2. No new FDG avid malignancy or metastatic disease. Electronically signed by:  Nael Hui MD  9/27/2022 2:38 PM CDT Workstation: [id number]    CT Abdomen Pelvis  Without Contrast    Result Date: 9/4/2022  CMS MANDATED QUALITY DATA - CT RADIATION  436 All CT scans at this facility utilize dose modulation, iterative reconstruction, and/or weight based dosing when appropriate to reduce radiation dose to as low as reasonably achievable. CT ABDOMEN PELVIS WITHOUT IV CONTRAST CLINICAL HISTORY: 72 years Male Follow-up pneumatosis COMPARISON: CT abdomen and pelvis September 1, 2022 FINDINGS: Imaging through the lower thorax demonstrates subsegmental atelectasis of the dependent lower lobes. Coronary artery calcification. Bone window images show no acute or aggressive osseous abnormality. Transitional lumbosacral vertebral body. On this unenhanced exam, no focal hepatic lesion. Gallbladder and biliary tree are unremarkable. Spleen appears normal. Pancreas is unremarkable. No adrenal lesion. No renal calculi or hydronephrosis. Ureters are normal in caliber. Urinary bladder is within normal limits. Gastrostomy tube is in place within the stomach. Stomach is largely collapsed. No evidence of small bowel obstruction. Pneumatosis and pericolonic abscess involving the ascending colon is redemonstrated,  slightly diminished compared to prior. Mild wall thickening throughout the colon. No free fluid or free air within the abdomen or pelvis. Aortoiliac atherosclerotic calcification. No pathologically enlarged lymph nodes within the abdomen or pelvis. Bilateral fat-containing inguinal hernias, larger on the left. IMPRESSION: Pneumatosis and pericolonic gas about the ascending colon has decreased in volume compared to prior. Persistent colonic wall thickening which could indicate colitis. Coronary and aortic atherosclerosis. Gastrostomy tube is within the stomach. Bilateral inguinal hernias. Electronically signed by:  Jose De Jesus Oleary MD  9/4/2022 4:06 PM CDT Workstation: [id number]    CTA neck  5/20/2022:     IMPRESSION:  Persistent mass within the hypopharynx consistent with malignancy.  By my measurements it is larger than on April 24, 2022 with central necrosis.  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 This is similar to the previous April 2022 study.  No evidence of arterial compromise related to malignancy.     PET 5/13/2022:     IMPRESSION:  1. Postoperative changes of prior laryngectomy and lymph node resection/radiation.  2. Masslike FDG avid lesion to the left of midline at the tongue base concerning for residual/recurrent disease.  3. Adjacent confluent nodular extension along the inferior left side of the mass.  4. No FDG avid lymphadenopathy or convincing additional sites of disease in the chest, abdomen or pelvis.     CT head 5/10/2022:  FINDINGS: Comparison to multiple prior exams. There is no acute intracranial hemorrhage, with no mass effect or abnormal extra-axial fluid. Mild scattered areas of nonspecific hypoattenuation involve the deep periventricular white matter, with gray-white differentiation maintained.     There is mild generalized prominence of the cortical sulci and ventricles. The cerebellum and brainstem are unremarkable. There  are carotid siphon vascular calcifications. The visualized paranasal sinuses and mastoid air cells are clear. There is no acute calvarial fracture or scalp hematoma.     IMPRESSION: No acute intracranial hemorrhage or acute calvarial fracture     CT soft neck 4/24/2022;     Oral tongue/tongue base:  At the base of the tongue on the left there are findings of a heterogeneously enhancing mass measuring 2.1 cm highly concerning for a neoplastic process.     True and false cords:  Patient status post laryngectomy which has been performed in the interim. Soft tissue stranding in the lower neck likely related to a previous flat reconstruction. No enhancement or lymphadenopathy within the lower neck.     Lymph node assessment:Negative     Surrounding soft tissues:  Negative     Vasculature:  Negative     Osseous structures:  Negative     Lung apices:  Scarring involving the lung apices bilaterally likely related to previous radiation.     IMPRESSION:  1. Postoperative and radiation changes involving the lower neck.  2. Findings highly concerning for a developing mass at the left base of the tongue enhancing 2.1 cm region. Direct visualization in this region would be of benefit.        CT soft neck  12/24/2021  IMPRESSION:  1.  Laryngeal mass as on prior exam. Laryngeal airway narrowing has not changed.  2.  No evidence for active hemorrhage.  3.  Partially visualized patchy groundglass infiltrates in both upper lobes. Also see CT chest report     CTA Chest 12/24/2021:  IMPRESSION:     1.  No pulmonary embolism.     2.  Soft tissue stranding in the region of the strap musculature with ill-defined hypodensity measuring 2.8 x 1.4 cm in the cervical midline.  Findings concerning for infectious process or abscess. Neoplastic process could have similar imaging characteristics.     3.  Suggested erosion of the proximal right parasymphyseal aspect of the thyroid shield.  Correlated with CT soft tissue neck findings. Findings could  represent osteomyelitis. Neoplastic process cannot be excluded.     4.  Hepatic steatosis.  5.  Thickening of the gastric wall. Prominence of perigastric vasculature. Small hiatal hernia.     6.  Moderate stool burden.  Correlate for constipation.        PET 12/15/2021:  IMPRESSION:     Substantial qualitative and quantitative increase of hypermetabolic FDG activity associated with the larynx in this patient with known supraglottic neoplasm.     No evidence of FDG avid metastatic disease involving neck, chest, abdomen or pelvis.     PET 8/21/2020:     Impression:     1. Intensely hypermetabolic plaque-like mass along the left true vocal cord, compatible with known laryngeal carcinoma.  2. No findings of regional metastatic disease in the neck, or distant metastatic disease.        CT Chest 8/10/2020:     IMPRESSION:     No metastatic disease within the chest.  Three-vessel coronary artery calcification. Nondilated cardiac  chambers     CT Soft neck 7/22/2020:  IMPRESSION:  1. Slight interval increase in size of the previously described  enhancing nodule along the anterior left vocal cord.  2. No pathologic lymphadenopathy.  3. Additional and incidental findings as noted above.     CT Soft neck  3/16/2020:     Impression:     6 mm enhancing focus involving the superior aspect of the left focal cord near the anterior commisure.  Recommend direct visualization for further evaluation of laryngeal polyp     Question of 2 mm submucosal lipoma involving the midportion of the superior aspect of the left vocal cord.     Mild arteriosclerosis involving the brachiocephalic arteries and carotid arteries     Mild degenerative change of the cervical spine        I have reviewed all available lab results and radiology reports.    Assessment/Plan:   (1) 74 y.o. 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      - Patient has been under the care of Dr Khoobehi with [facility] and Dr Portillo with rad/onc.   - He was recently found to have a new primary cancer involving the base of his tongue in April 2022. He was deemed not to be a candidate for any further radiation and did not want to undergo any further surgery as this would necessitate a glossectomy procedure.   - He has been on adjuvant chemotherapy per direction of Dr Khoobehi and has had 3 cycles. His therapy course has been complicated with persistent diarrhea and N/V.      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 He saw Dr Lucero with Rad/onc this am. They are going to present his case to H&N Tumor Board at [facility] and plans to see Dr James about surgical options. If he is not a surgical candidate then will proceed with cisplatin and XRT combine therapy.     10/6/2022:  - He saw Dr Lucero with Rad/onc, and Dr James and his case was presented to H&N Tumor Board at [facility] and  he general consensus was to proceed to surgery.  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   - He had his portacath removed on 11/28.   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, keratinizing type, moderate to poorly differentiated tumor  - Four LN's were all negative  - he did have a positive left superior pharyngeal margin  - pT3 pN0  - reviewed the latest NCCN guidelines again from Version 1.2023; he may need some further systemic therapy due to the margin  - check on Rad/onc follow-up     1/23/2023:  - s/p new portacath placed on 1/12/2023 with Dr Le  - starting combined chemotherapy and XRT - cisplatin  - s/p chemotherapy school with [facility]    2/6/2023:  - he seems to be tolerating the chemotherapy well at this time  - continued with concomitant therapy with XRT    2/22/2023:  - he seems to be tolerating the chemotherapy well at this time  - continued with concomitant therapy with XRT  - labs relatively adequate  - will check on his refills    3/6/2023:  - finishing up XRT this comking Thursday  - last chemotherapy today    4/3/2023:  - He has since completed chemotherapy and  XRT.  He seems to have tolerated the regimen fairly well with no new complaints.  Some weight loss but reports he is staying hydrated. He does have some occasional GRIFFIN.   - He is seeing ENT tomorrow with Dr James    5/31/2023:  - He has since completed chemotherapy and  XRT.  He seems to have tolerated the regimen fairly well . He saw Dr James with ENT on 5/2/2023 and had repeat scope with good report per patient.  - He had recent PET yesterday on 5/30 which overall looks adequate    8/23/2023:  - sees Dr James again on 9/5/2023  - will set up repeat CT neck and chest for Sept 2023  - reach out to dietary about foods he can eat that are higher in iron  - consider IV iron x2 (he can not swallow pills)  - see if we can renew PT    11/15/2023:  - He previously had completed chemotherapy and  XRT.  He seems to have tolerated the regimen fairly well . He saw Dr James with ENT on 11/72023 and had repeat scope with good report per patient.  -  He last saw Dr Patterson on 8/3/2023 and sees him again in Jan 2024, Dr Portillo on 6/19/2023  - He had last PET on 5/30  and CT Chest/Neck in Sept 2023  - he missed one IV iron  - due for up to date labs    2/20/2024:  - He saw Dr James with ENT 2/6/2024 with DL with good report per patient. He sees him again in 3 months  - He saw Dr Patterson on 2/5/2024  - He had PEt on 2/7/2024   - some residual thrombocytopenia post-chemotherapy - monitor for now as this may take some time to recover     5/20/2024:  - He saw Dr James with ENT 5/7/2024 with DL with good report per patient. He sees him again on 6/18/2024 for ear flush.   - He saw Dr Patterson on 5/10/2024; Dena Silveira NP with endocrine in April 2024  - he had tube exchange with Dr Cooper in April 2024  - still having some hypocalcemia issues  - he declined referral to podiatrist for his right ankle issues    8/20/2024:  - he recently saw Dr James and had repeat LS  - sees Dr James again in Sepot 2024  - will order f/u CT chest/neck  - mild anemia and mild thrombocytopenia - will continue to monitor     (2) Hx/of Laryngeal cancer in Aug 2020 stage II (cT2 N0)  - s/p radiation followed by bilateral neck dissection and total thyroidectomy     Brief Oncology Summary for his laryngeal CA hx:     Patient was noted to initially have a suspicious lesion on the left true vocal cord by laryngoscopy with Dr Xiao in March 2020 with biopsy showing severe dysplastic changes without definitive invasive process. He had a CT scan on 3/16/2020 which showed a 6mm nodule on the left vocal cord. A repeat Ct scan four months later on 7/22/2020 showed the nodule enlarged to 1.4 x 1.1 cm in size. Patient was then seen by Dr Noel and underwent repeat scope in Aug 2020 who found a bulky deeply invading tumor which was debulked and the pathology coming back moderately differentiated SCCA without lymphovascular or perineural invasion. Hs case was subsequently presented to the tumor board and  the consensus was to proceed with radiation. He completed XRT on 10/30/2020 and proceeded with regular laryngoscopy surveillance. He eventually required salvage laryngectomy and neck dissection with flap with Dr James on Jan 6th 2022. 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 Fifty lymph nodes were removed which were all negative. Pathology TNM was pT4a, pN0. Patient did not require any adjuvant therapy afterwards.      (2) HTN and hypercholesterolemia     (3) Paroxysmal atrial fibrillation, V tach - seen by Anne Ugarte NP     (4) DM II     (5) B12 deficiency      (6) Fatty liver disease, GERD           VISIT DIAGNOSES:      Tracheostomy in place    S/P laryngectomy    Larynx cancer    Malignant neoplasm of base of tongue    Iron deficiency anemia due to chronic blood loss    Laryngeal cancer    Chemotherapy-induced neutropenia    Iron deficiency anemia, unspecified iron deficiency anemia type    Secondary malignant neoplasm of other specified sites    Cancer related pain    Vitamin B12 deficiency    Abnormal CT scan, neck          PLAN:  continue with ENT and rad/onc f/u as directed by them respectively - sees Dr James again in Sept 2024 - will order f/u CT neck and chest  Continue to monitor labs and resume IV iron as needed   s/p chemotherapy school with Briana - discussed the side-effect profile, provided literature and obtained consents  F/u Rad/onc follow-up as directed  Check labs monthly for now  F/u with PCP, ENT, etc  Dietician f/u on the tube feeds      RTC in 12 weeks with myself  Fax note to  Bandar/Erika Lucero, Yesenia Gomez; Raoul Cooper       Discussion:     COVID-19 Discussion:     I had long discussion with patient and any applicable family about the COVID-19 coronavirus epidemic and the recommended precautions with regard to cancer and/or  "hematology patients. I have re-iterated the CDC recommendations for adequate hand washing, use of hand -like products, and coughing into elbow, etc. In addition, especially for our patients who are on chemotherapy and/or our otherwise immunocompromised patients, I have recommended avoidance of crowds, including movie theaters, restaurants, churches, etc. I have recommended avoidance of any sick or symptomatic family members and/or friends. I have also recommended avoidance of any raw and unwashed food products, and general avoidance of food items that have not been prepared by themselves. The patient has been asked to call us immediately with any symptom developments, issues, questions or other general concerns.         Pathology Discussion:     I reviewed and discussed the pathology report(s) and radiograph reports (if available) in as simple to understand and/or laymen's terms to the best of my ability. I had an indepth conversation with the patient and went over the patient's individual diagnosis based on the information that was currently available. I discussed the TNM staging process with regard to the patient's particular cancer type, and the calculated stage based on the currently available TNM data and literature. I discussed the available prognostic data with regard to the current staging information and how it relates to the prognosis of their particular neoplastic process.          NCCN Guidelines:     I discussed the available treatment option(s) in accordance with the latest literature from the NCCN Clinical Practice Guidelines for the patient's particular type of cancer disorder. The NCCN Guidelines provide a "document evidence-based (and) consensus-driven management" of the care of oncology patients. The treatment recommendations were made not only in accordance to the NCCN guidelines, but also factored in to account the patient's overall age, condition, performance status and their medical " co-morbidities. I went over the risks and benefits of the the treatment options (if any could be made) with regard to their particular cancer type, their cancer stage, their age, and their co-morbidities.         Chemotherapy Discussion:      I discussed the available treatment option(s) in accordance with the latest/current national evidence-based guidelines (NCCN, UpToDate, NCI, ASCO, etc where applicable), their overall age/condition and their co-morbidities. I also went over the risks and benefits of the chemotherapy with regard to their particular cancer type, their cancer stage, their age/condition, and their co-morbidities. I provided literature on the chemotherapy regimen and discussed the chemotherapy side-effect profiles of the drug(s). I discussed the importance of compliance with obtaining and monitoring weekly lab work, and went over the potential hematopathology issues and risks with anemia, leucopenia and thrombocytopenia that can occur with chemotherapy. I discussed the potential risks of liver and kidney damage, which could be permanent and could necessitate dialysis long-term if kidney failure developed. I discussed the emetic and/or diarrheal potential of the regimen and the potential need for use of antiemetic and anti-diarrheal medications. I discussed the risk for development of anaphylactic shock, bronchospasm, dysrhythmia, and respiratory/cardiovascular arrest and/or failure. I discussed the potential risks for development of alopecia, cold sensory issues, ringing in ears, vertigo, cataracts, glaucoma, and neuropathy, all of which could end up being chronic and life-long. Some chemotherpyI discussed the risks of hand-foot syndrome and rashes, and development of other autoimmune mediated processes such as pneumonitis, hepatitis, and colitis which could be life threatening. I discussed the risks of the potential development of a rare but fatal viral mediated disease known as PML (Progressive  Multifocal Leukoencephalopathy), and risk of future development of leukemia and/or lymphoma from use of certain chemotherapy agents. I discussed the need for neutropenic precautions, basic hygiene/sanitation behaviors and dietary restrictions.    The patient's consent has been obtained to proceed with the chemotherapy.The patient will be referred to Chemotherapy School /[facility] for training and education on chemotherapy, use of antiemetics and/or anti-diarrheals, use of NSAID's, potential chemotherapy side-effects, and any specific recommendations and precautions with the particular chemotherapy agents.      I answered all of the patient's (and family's, if applicable) questions to the best of my ability and to their complete satisfaction. The patient acknowledged full understanding of the risks, recommendations and plan(s).     I have explained and the patient understands all of  the current recommendation(s). I have answered all of their questions to the best of my ability and to their complete satisfaction.         I spent over 25 mins of time with the patient. Reviewed results of the recently ordered labs, tests and studies; made directives with regards to the results. Over half of this time was spent couseling and coordinating care.    I have explained all of the above in detail and the patient understands all of the current recommendation(s). I have answered all of their questions to the best of my ability and to their complete satisfaction.   The patient is to continue with the current management plan.            Electronically signed by Venu Tamez MD                     Answers submitted by the patient for this visit:  Review of Systems Questionnaire (Submitted on 8/14/2024)  appetite change : No  unexpected weight change: No  mouth sores: No  visual disturbance: No  cough: No  shortness of breath: No  chest pain: No  abdominal pain: No  diarrhea: No  frequency: No  back pain: No  rash:  No  headaches: No  adenopathy: No  nervous/ anxious: No

## 2024-08-28 ENCOUNTER — HOSPITAL ENCOUNTER (OUTPATIENT)
Dept: RADIOLOGY | Facility: HOSPITAL | Age: 74
Discharge: HOME | End: 2024-08-28
Attending: INTERNAL MEDICINE
Payer: MEDICARE

## 2024-08-28 DIAGNOSIS — Z93.0 TRACHEOSTOMY IN PLACE: ICD-10-CM

## 2024-08-28 DIAGNOSIS — Z90.02 S/P LARYNGECTOMY: ICD-10-CM

## 2024-08-28 DIAGNOSIS — C32.9 LARYNX CANCER: ICD-10-CM

## 2024-08-28 PROCEDURE — 71260 CT THORAX DX C+: CPT | Mod: TC,PO

## 2024-08-28 PROCEDURE — 70491 CT SOFT TISSUE NECK W/DYE: CPT | Mod: 26,,, | Performed by: RADIOLOGY

## 2024-08-28 PROCEDURE — 25500020 PHARM REV CODE 255: Mod: PO | Performed by: INTERNAL MEDICINE

## 2024-08-28 PROCEDURE — 71260 CT THORAX DX C+: CPT | Mod: 26,,, | Performed by: RADIOLOGY

## 2024-08-28 PROCEDURE — 70491 CT SOFT TISSUE NECK W/DYE: CPT | Mod: TC,PO

## 2024-08-28 RX ADMIN — IOHEXOL 100 ML: 350 INJECTION, SOLUTION INTRAVENOUS at 01:08

## 2024-08-30 ENCOUNTER — TELEPHONE (OUTPATIENT)
Facility: CLINIC | Age: 74
End: 2024-08-30
Payer: MEDICARE

## 2024-08-30 DIAGNOSIS — I25.10 CORONARY ARTERY DISEASE, UNSPECIFIED VESSEL OR LESION TYPE, UNSPECIFIED WHETHER ANGINA PRESENT, UNSPECIFIED WHETHER NATIVE OR TRANSPLANTED HEART: ICD-10-CM

## 2024-08-30 DIAGNOSIS — R93.89 ABNORMAL CT SCAN, NECK: Primary | ICD-10-CM

## 2024-08-30 DIAGNOSIS — I73.9 PAD (PERIPHERAL ARTERY DISEASE): ICD-10-CM

## 2024-08-30 NOTE — TELEPHONE ENCOUNTER
Wife made aware of CT finding and states she does not believe patient sees cardiology at this time as the MD he was seeing is no longer in network. I informed that I will send referral to Dr. Carlton and that they will call with appt. Verbalized understanding.

## 2024-09-03 ENCOUNTER — TELEPHONE (OUTPATIENT)
Facility: CLINIC | Age: 74
End: 2024-09-03
Payer: MEDICARE

## 2024-09-03 NOTE — TELEPHONE ENCOUNTER
Attempted to call to make wife aware that they should f/u with ENT concerning sinus findings on CT but that Dr. Tamez was more concerned with him seeing cardiology for blocked arteries. No answer, LVM.

## 2024-09-03 NOTE — TELEPHONE ENCOUNTER
----- Message from Venu Tamez MD sent at 8/30/2024  4:46 PM CDT -----  It is not the sinuses I'm worried about , its the blocked arteries ... F/u with his ENT for the sinuses  ----- Message -----  From: Ml Avery, RN  Sent: 8/30/2024   2:28 PM CDT  To: Venu Tamez MD    Wife was asking if you can please comment on the sinus findings and if anything needs to be done concerning this at this time  ----- Message -----  From: Venu Tamez MD  Sent: 8/28/2024   6:09 PM CDT  To: [facility] Staff    Does he have a cardiologist ? If not, refer him to cardiology please. Also make his PCP aware of the CAD/PAD findings

## 2024-09-11 NOTE — TELEPHONE ENCOUNTER
"Sorry for delay     New Rx sent for 125 microgram for 90 days and 3 refills      Stay Safe over the bad weather    SR    Cold intolerance  -     levothyroxine (SYNTHROID) 125 MCG tablet; 1 tablet (125 mcg total) by Per G Tube route before breakfast.  Dispense: 90 tablet; Refill: 3    Postprocedural hypoparathyroidism  -     levothyroxine (SYNTHROID) 125 MCG tablet; 1 tablet (125 mcg total) by Per G Tube route before breakfast.  Dispense: 90 tablet; Refill: 3      ===View-only below this line===      ----- Message -----       From:Cyrus Batres Jr. "Nicolás"       Sent:8/6/2024 10:57 AM CDT         To:Maico Patterson    Subject:Thyroid pills    Dr you said you were going to up my pill strength to 125.  They still have my dose as 100.  Can you send them a message?  Thanks Nicolás"

## 2024-09-16 ENCOUNTER — TELEPHONE (OUTPATIENT)
Facility: CLINIC | Age: 74
End: 2024-09-16
Payer: MEDICARE

## 2024-09-16 ENCOUNTER — LAB VISIT (OUTPATIENT)
Dept: LAB | Facility: HOSPITAL | Age: 74
End: 2024-09-16
Attending: INTERNAL MEDICINE
Payer: MEDICARE

## 2024-09-16 DIAGNOSIS — R17 ELEVATED BILIRUBIN: Primary | ICD-10-CM

## 2024-09-16 DIAGNOSIS — C32.9 LARYNGEAL CANCER: ICD-10-CM

## 2024-09-16 DIAGNOSIS — D50.0 IRON DEFICIENCY ANEMIA DUE TO CHRONIC BLOOD LOSS: ICD-10-CM

## 2024-09-16 LAB
ALBUMIN SERPL BCP-MCNC: 4.4 G/DL (ref 3.5–5.2)
ALP SERPL-CCNC: 150 U/L (ref 55–135)
ALT SERPL W/O P-5'-P-CCNC: 30 U/L (ref 10–44)
ANION GAP SERPL CALC-SCNC: 10 MMOL/L (ref 8–16)
AST SERPL-CCNC: 35 U/L (ref 10–40)
BASOPHILS # BLD AUTO: 0.02 K/UL (ref 0–0.2)
BASOPHILS NFR BLD: 0.4 % (ref 0–1.9)
BILIRUB SERPL-MCNC: 1.3 MG/DL (ref 0.1–1)
BUN SERPL-MCNC: 31 MG/DL (ref 8–23)
CALCIUM SERPL-MCNC: 9 MG/DL (ref 8.7–10.5)
CHLORIDE SERPL-SCNC: 96 MMOL/L (ref 95–110)
CO2 SERPL-SCNC: 28 MMOL/L (ref 23–29)
CREAT SERPL-MCNC: 1.2 MG/DL (ref 0.5–1.4)
DIFFERENTIAL METHOD BLD: ABNORMAL
EOSINOPHIL # BLD AUTO: 0.3 K/UL (ref 0–0.5)
EOSINOPHIL NFR BLD: 5.6 % (ref 0–8)
ERYTHROCYTE [DISTWIDTH] IN BLOOD BY AUTOMATED COUNT: 13.1 % (ref 11.5–14.5)
EST. GFR  (NO RACE VARIABLE): >60 ML/MIN/1.73 M^2
FERRITIN SERPL-MCNC: 141.2 NG/ML (ref 20–300)
GLUCOSE SERPL-MCNC: 98 MG/DL (ref 70–110)
HCT VFR BLD AUTO: 38.2 % (ref 40–54)
HGB BLD-MCNC: 13.3 G/DL (ref 14–18)
IMM GRANULOCYTES # BLD AUTO: 0.03 K/UL (ref 0–0.04)
IMM GRANULOCYTES NFR BLD AUTO: 0.5 % (ref 0–0.5)
IRON SERPL-MCNC: 80 UG/DL (ref 45–160)
LYMPHOCYTES # BLD AUTO: 0.9 K/UL (ref 1–4.8)
LYMPHOCYTES NFR BLD: 16.7 % (ref 18–48)
MCH RBC QN AUTO: 32.3 PG (ref 27–31)
MCHC RBC AUTO-ENTMCNC: 34.8 G/DL (ref 32–36)
MCV RBC AUTO: 93 FL (ref 82–98)
MONOCYTES # BLD AUTO: 0.4 K/UL (ref 0.3–1)
MONOCYTES NFR BLD: 7.7 % (ref 4–15)
NEUTROPHILS # BLD AUTO: 3.9 K/UL (ref 1.8–7.7)
NEUTROPHILS NFR BLD: 69.1 % (ref 38–73)
NRBC BLD-RTO: 0 /100 WBC
PLATELET # BLD AUTO: 122 K/UL (ref 150–450)
PMV BLD AUTO: 12.5 FL (ref 9.2–12.9)
POTASSIUM SERPL-SCNC: 3.9 MMOL/L (ref 3.5–5.1)
PROT SERPL-MCNC: 7.2 G/DL (ref 6–8.4)
RBC # BLD AUTO: 4.12 M/UL (ref 4.6–6.2)
SATURATED IRON: 22 % (ref 20–50)
SODIUM SERPL-SCNC: 134 MMOL/L (ref 136–145)
TOTAL IRON BINDING CAPACITY: 360 UG/DL (ref 250–450)
TRANSFERRIN SERPL-MCNC: 257 MG/DL (ref 200–375)
WBC # BLD AUTO: 5.58 K/UL (ref 3.9–12.7)

## 2024-09-16 PROCEDURE — 83540 ASSAY OF IRON: CPT | Performed by: INTERNAL MEDICINE

## 2024-09-16 PROCEDURE — 85025 COMPLETE CBC W/AUTO DIFF WBC: CPT | Performed by: INTERNAL MEDICINE

## 2024-09-16 PROCEDURE — 80053 COMPREHEN METABOLIC PANEL: CPT | Performed by: INTERNAL MEDICINE

## 2024-09-16 PROCEDURE — 82728 ASSAY OF FERRITIN: CPT | Performed by: INTERNAL MEDICINE

## 2024-09-16 PROCEDURE — 36415 COLL VENOUS BLD VENIPUNCTURE: CPT | Performed by: INTERNAL MEDICINE

## 2024-09-19 ENCOUNTER — PROCEDURE VISIT (OUTPATIENT)
Dept: OTOLARYNGOLOGY | Facility: CLINIC | Age: 74
End: 2024-09-19
Payer: MEDICARE

## 2024-09-19 VITALS
HEART RATE: 63 BPM | DIASTOLIC BLOOD PRESSURE: 63 MMHG | SYSTOLIC BLOOD PRESSURE: 122 MMHG | WEIGHT: 147.69 LBS | BODY MASS INDEX: 23.84 KG/M2

## 2024-09-19 DIAGNOSIS — C32.9 LARYNX CANCER: Primary | ICD-10-CM

## 2024-09-19 DIAGNOSIS — C01 MALIGNANT NEOPLASM OF BASE OF TONGUE: ICD-10-CM

## 2024-09-20 ENCOUNTER — HOSPITAL ENCOUNTER (OUTPATIENT)
Dept: RADIOLOGY | Facility: HOSPITAL | Age: 74
Discharge: HOME | End: 2024-09-20
Attending: INTERNAL MEDICINE
Payer: MEDICARE

## 2024-09-20 DIAGNOSIS — R17 ELEVATED BILIRUBIN: ICD-10-CM

## 2024-09-20 PROCEDURE — 76705 ECHO EXAM OF ABDOMEN: CPT | Mod: 26,,, | Performed by: RADIOLOGY

## 2024-09-20 PROCEDURE — 76705 ECHO EXAM OF ABDOMEN: CPT | Mod: TC,PO

## 2024-10-02 ENCOUNTER — INFUSION (OUTPATIENT)
Dept: INFUSION THERAPY | Facility: HOSPITAL | Age: 74
End: 2024-10-02
Attending: INTERNAL MEDICINE
Payer: MEDICARE

## 2024-10-02 VITALS
HEART RATE: 66 BPM | TEMPERATURE: 98 F | BODY MASS INDEX: 23.65 KG/M2 | SYSTOLIC BLOOD PRESSURE: 113 MMHG | OXYGEN SATURATION: 100 % | DIASTOLIC BLOOD PRESSURE: 59 MMHG | RESPIRATION RATE: 16 BRPM | HEIGHT: 66 IN | WEIGHT: 147.13 LBS

## 2024-10-02 DIAGNOSIS — E86.0 DEHYDRATION: ICD-10-CM

## 2024-10-02 DIAGNOSIS — C01 MALIGNANT NEOPLASM OF BASE OF TONGUE: ICD-10-CM

## 2024-10-02 DIAGNOSIS — C32.9 LARYNX CANCER: Primary | ICD-10-CM

## 2024-10-02 PROCEDURE — 63600175 PHARM REV CODE 636 W HCPCS: Performed by: INTERNAL MEDICINE

## 2024-10-02 PROCEDURE — 96523 IRRIG DRUG DELIVERY DEVICE: CPT

## 2024-10-02 RX ORDER — HEPARIN 100 UNIT/ML
500 SYRINGE INTRAVENOUS
Status: DISCONTINUED | OUTPATIENT
Start: 2024-10-02 | End: 2024-10-02 | Stop reason: HOSPADM

## 2024-10-02 RX ORDER — HEPARIN 100 UNIT/ML
500 SYRINGE INTRAVENOUS
OUTPATIENT
Start: 2024-10-02

## 2024-10-02 RX ORDER — SODIUM CHLORIDE 0.9 % (FLUSH) 0.9 %
10 SYRINGE (ML) INJECTION
Status: DISCONTINUED | OUTPATIENT
Start: 2024-10-02 | End: 2024-10-02 | Stop reason: HOSPADM

## 2024-10-02 RX ORDER — SODIUM CHLORIDE 0.9 % (FLUSH) 0.9 %
10 SYRINGE (ML) INJECTION
OUTPATIENT
Start: 2024-10-02

## 2024-10-02 RX ADMIN — HEPARIN 500 UNITS: 100 SYRINGE at 11:10

## 2024-10-14 ENCOUNTER — OFFICE VISIT (OUTPATIENT)
Dept: CARDIOLOGY | Facility: CLINIC | Age: 74
End: 2024-10-14
Payer: MEDICARE

## 2024-10-14 VITALS
DIASTOLIC BLOOD PRESSURE: 74 MMHG | WEIGHT: 145.5 LBS | SYSTOLIC BLOOD PRESSURE: 127 MMHG | HEIGHT: 66 IN | BODY MASS INDEX: 23.38 KG/M2 | HEART RATE: 65 BPM

## 2024-10-14 DIAGNOSIS — R93.89 ABNORMAL CT SCAN, NECK: ICD-10-CM

## 2024-10-14 DIAGNOSIS — I25.10 CORONARY ARTERY CALCIFICATION: Primary | ICD-10-CM

## 2024-10-14 DIAGNOSIS — E78.5 HYPERLIPIDEMIA, UNSPECIFIED HYPERLIPIDEMIA TYPE: Chronic | ICD-10-CM

## 2024-10-14 DIAGNOSIS — I25.10 CORONARY ARTERY DISEASE, UNSPECIFIED VESSEL OR LESION TYPE, UNSPECIFIED WHETHER ANGINA PRESENT, UNSPECIFIED WHETHER NATIVE OR TRANSPLANTED HEART: ICD-10-CM

## 2024-10-14 PROCEDURE — 3074F SYST BP LT 130 MM HG: CPT | Mod: CPTII,S$GLB,, | Performed by: INTERNAL MEDICINE

## 2024-10-14 PROCEDURE — 3072F LOW RISK FOR RETINOPATHY: CPT | Mod: CPTII,S$GLB,, | Performed by: INTERNAL MEDICINE

## 2024-10-14 PROCEDURE — 3044F HG A1C LEVEL LT 7.0%: CPT | Mod: CPTII,S$GLB,, | Performed by: INTERNAL MEDICINE

## 2024-10-14 PROCEDURE — 1101F PT FALLS ASSESS-DOCD LE1/YR: CPT | Mod: CPTII,S$GLB,, | Performed by: INTERNAL MEDICINE

## 2024-10-14 PROCEDURE — 1126F AMNT PAIN NOTED NONE PRSNT: CPT | Mod: CPTII,S$GLB,, | Performed by: INTERNAL MEDICINE

## 2024-10-14 PROCEDURE — 1160F RVW MEDS BY RX/DR IN RCRD: CPT | Mod: CPTII,S$GLB,, | Performed by: INTERNAL MEDICINE

## 2024-10-14 PROCEDURE — 99999 PR PBB SHADOW E&M-EST. PATIENT-LVL IV: CPT | Mod: PBBFAC,,, | Performed by: INTERNAL MEDICINE

## 2024-10-14 PROCEDURE — 3008F BODY MASS INDEX DOCD: CPT | Mod: CPTII,S$GLB,, | Performed by: INTERNAL MEDICINE

## 2024-10-14 PROCEDURE — 1157F ADVNC CARE PLAN IN RCRD: CPT | Mod: CPTII,S$GLB,, | Performed by: INTERNAL MEDICINE

## 2024-10-14 PROCEDURE — 3288F FALL RISK ASSESSMENT DOCD: CPT | Mod: CPTII,S$GLB,, | Performed by: INTERNAL MEDICINE

## 2024-10-14 PROCEDURE — 3078F DIAST BP <80 MM HG: CPT | Mod: CPTII,S$GLB,, | Performed by: INTERNAL MEDICINE

## 2024-10-14 PROCEDURE — 99214 OFFICE O/P EST MOD 30 MIN: CPT | Mod: S$GLB,,, | Performed by: INTERNAL MEDICINE

## 2024-10-14 PROCEDURE — 1159F MED LIST DOCD IN RCRD: CPT | Mod: CPTII,S$GLB,, | Performed by: INTERNAL MEDICINE

## 2024-10-14 NOTE — PROGRESS NOTES
Subjective:    Patient ID:  Cyrus Batres Jr. is a 74 y.o. male patient here for evaluation coronary calcifications      History of Present Illness:     74-year-old male past medical history as mentioned below here for clinic follow up.  This patient is new to me.  He was evaluated by the nurse practitioner in the past.  He recently had a CT neck which showed extensive coronary artery calcification seen referred to Cardiology.  Patient denies any anginal symptoms.  Denies palpitations.  He has a history of tongue cancer and laryngeal cancer status post tracheostomy and PEG tube placement.  Glucerna via PEG tube for feeding.  He has history of atrial fibrillation and anticoagulation appears to be discontinued in past secondary to bleeding.  His malignancy is currently in remission      Review of patient's allergies indicates:   Allergen Reactions    Lovastatin Itching    Pollen extracts     Lovastatin Rash     Not confirmed but pt skeptical       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    Tongue cancer     Tracheostomy dependence     Type 2 diabetes mellitus, without long-term current use of insulin 11/22/2022    Unspecified glaucoma      Past Surgical History:   Procedure Laterality Date    CATARACT EXTRACTION W/  INTRAOCULAR LENS IMPLANT Right 8/16/2023    Procedure: CEIOL OD  NPO-peg;  Surgeon: Stoney Munoz MD;  Location: [facility] OR;  Service:  Ophthalmology;  Laterality: Right;    CATARACT EXTRACTION W/  INTRAOCULAR LENS IMPLANT Left 10/18/2023    Procedure: CEIOL OS npo peg;  Surgeon: Stoney Munoz MD;  Location: [facility] OR;  Service: Ophthalmology;  Laterality: Left;    DIRECT LARYNGOBRONCHOSCOPY N/A 12/27/2021    Procedure: LARYNGOSCOPY, DIRECT, WITH BRONCHOSCOPY;  Surgeon: Flex Espinosa MD;  [facility];  Service: ENT;  Laterality: N/A;    DIRECT LARYNGOSCOPY  11/9/2022    Procedure: LARYNGOSCOPY, DIRECT;  Surgeon: Jesse James MD;  [facility];  Service: ENT;;    DISSECTION OF NECK Bilateral 1/6/2022    Procedure: DISSECTION, NECK;  Surgeon: Jesse James MD;  [facility];  Service: ENT;  Laterality: Bilateral;    DISSECTION OF NECK Bilateral 11/9/2022    Procedure: DISSECTION, NECK;  Surgeon: Jesse James MD;  Location: [facility] OR [address] [place] [address];  Service: ENT;  Laterality: Bilateral;    ESOPHAGOGASTRODUODENOSCOPY N/A 4/9/2024    Procedure: EGD (ESOPHAGOGASTRODUODENOSCOPY);  Surgeon: Matheus Cooper III, MD;  Location: [facility];  Service: Endoscopy;  Laterality: N/A;    FLAP PROCEDURE Right 1/6/2022    Procedure: CREATION, FREE FLAP;  Surgeon: Alise Hart MD;  [facility];  Service: ENT;  Laterality: Right;  Ischemic start 1351  Ischemic stop 1502    FLAP PROCEDURE Left 11/9/2022    Procedure: CREATION, FREE FLAP, ALT;  Surgeon: Alise Hart MD;  [facility];  Service: ENT;  Laterality: Left;    GLOSSECTOMY Bilateral 11/9/2022    Procedure: TOTAL GLOSSECTOMY;  Surgeon: Jesse James MD;  [facility];  Service: ENT;  Laterality: Bilateral;    INSERTION OF TUNNELED CENTRAL VENOUS CATHETER (CVC) WITH SUBCUTANEOUS PORT N/A 6/9/2022    Procedure: [id number];  Surgeon: Jesus Viera MD;  Location: [facility];  Service: General;  Laterality: N/A;    INSERTION OF TUNNELED CENTRAL VENOUS CATHETER (CVC) WITH SUBCUTANEOUS PORT  Right 1/12/2023    Procedure: [id number];  Surgeon: Abdulaziz Le Jr., MD;  Location: [facility];  Service: General;  Laterality: Right;    LARYNGECTOMY N/A 1/6/2022    Procedure: LARYNGECTOMY;  Surgeon: Jesse Jmaes MD;  Location: [facility] OR 2ND FLR;  Service: ENT;  Laterality: N/A;    LARYNGOSCOPY N/A 8/4/2020    Procedure: Suspension microlaryngoscopy with biopsy, possible KTP laser treatment/excision;  Surgeon: Stew Noel MD;  [facility];  Service: ENT;  Laterality: N/A;  Microscope, telescopes, tower, microinstruments, KTP laser, rep conf# [id number] IC 7/28.    LARYNGOSCOPY N/A 3/16/2021    Procedure: Suspension microlaryngoscopy with excision of lesion, possible CO2 laser;  Surgeon: Stew Noel MD;  [facility];  Service: ENT;  Laterality: N/A;  Microscope, telescopes, tower, microinstruments, CO2 laser, rep conf# [id number] IC 3/4.    LARYNGOSCOPY N/A 4/1/2021    Procedure: Suspension microlaryngoscopy with KTP laser excision of lesion;  Surgeon: Stew Noel MD;  [facility];  Service: ENT;  Laterality: N/A;  Microscope, telescopes, tower, microinstruments, 70 degree scope, vocal fold , KTP laser, rep conf# [id number] BC    LARYNGOSCOPY N/A 12/9/2021    Procedure: Suspension microlaryngoscopy with biopsy;  Surgeon: Stew Noel MD;  [facility];  Service: ENT;  Laterality: N/A;  Microscope, telescopes, tower, microinstruments    LARYNGOSCOPY N/A 1/6/2022    Procedure: LARYNGOSCOPY;  Surgeon: Jesse James MD;  [facility];  Service: ENT;  Laterality: N/A;    LARYNGOSCOPY N/A 4/27/2022    Procedure: LARYNGOSCOPY WITH BIOPSY;  Surgeon: Jesse James MD;  [facility];  Service: ENT;  Laterality: N/A;    MICROLARYNGOSCOPY N/A 3/17/2020    Procedure: MICROLARYNGOSCOPY;  Surgeon: Jung Xiao MD;  Location: [facility];  Service: ENT;  Laterality: N/A;  Laser  Microlaryngoscopy  NEED TO SCHEDULE LASER from [facility] [phone]    PHARYNGECTOMY  11/9/2022    Procedure: TOTAL PHARYNGECTOMY;  Surgeon: Jesse James MD;  Location: [facility] OR 2ND FLR;  Service: ENT;;    REIMPLANTATION OF PARATHYROID TISSUE N/A 1/6/2022    Procedure: REIMPLANTATION, PARATHYROID TISSUE;  Surgeon: Jesse James MD;  Location: [facility] OR 2ND FLR;  Service: ENT;  Laterality: N/A;    SKIN SPLIT GRAFT Right 11/9/2022    Procedure: APPLICATION, GRAFT, SKIN, SPLIT-THICKNESS;  Surgeon: Jesse James MD;  Location: [facility] OR 2ND FLR;  Service: ENT;  Laterality: Right;    THYROIDECTOMY  1/6/2022    Procedure: THYROIDECTOMY;  Surgeon: Jesse James MD;  Location: [facility] OR [facility] FLR;  Service: ENT;;    TRACHEOSTOMY N/A 12/27/2021    Procedure: CREATION, TRACHEOSTOMY;  Surgeon: Flex Espinosa MD;  Location: [facility] OR 2ND FLR;  Service: ENT;  Laterality: N/A;     Social History     Tobacco Use    Smoking status: Never    Smokeless tobacco: Never   Substance Use Topics    Alcohol use: Not Currently     Comment: occasional    Drug use: No        Review of Systems   Negative except as mentioned in HPI         Objective        Vitals:    10/14/24 1418   BP: 127/74   Pulse: 65       LIPIDS - LAST 2   Lab Results   Component Value Date    CHOL 162 05/17/2023    CHOL 144 02/14/2022    HDL 56 05/17/2023    HDL 48 02/14/2022    LDLCALC 85.6 05/17/2023    LDLCALC 75.4 02/14/2022    TRIG 102 05/17/2023    TRIG 120 09/03/2022    CHOLHDL 34.6 05/17/2023    CHOLHDL 33.3 02/14/2022       CBC - LAST 2  Lab Results   Component Value Date    WBC 5.58 09/16/2024    WBC 5.91 08/14/2024    RBC 4.12 (L) 09/16/2024    RBC 3.92 (L) 08/14/2024    HGB 13.3 (L) 09/16/2024    HGB 12.3 (L) 08/14/2024    HCT 38.2 (L) 09/16/2024    HCT 36.6 (L) 08/14/2024    MCV 93 09/16/2024    MCV 93 08/14/2024    MCH 32.3 (H) 09/16/2024    MCH 31.4 (H) 08/14/2024    MCHC 34.8 09/16/2024    MCHC 33.6 08/14/2024    RDW 13.1  09/16/2024    RDW 13.2 08/14/2024     (L) 09/16/2024     (L) 08/14/2024    MPV 12.5 09/16/2024    MPV 12.5 08/14/2024    GRAN 3.9 09/16/2024    GRAN 69.1 09/16/2024    LYMPH 0.9 (L) 09/16/2024    LYMPH 16.7 (L) 09/16/2024    MONO 0.4 09/16/2024    MONO 7.7 09/16/2024    BASO 0.02 09/16/2024    BASO 0.02 08/14/2024    NRBC 0 09/16/2024    NRBC 0 08/14/2024       CHEMISTRY & LIVER FUNCTION - LAST 2  Lab Results   Component Value Date     (L) 09/16/2024     08/14/2024    K 3.9 09/16/2024    K 3.7 08/14/2024    CL 96 09/16/2024    CL 97 08/14/2024    CO2 28 09/16/2024    CO2 31 (H) 08/14/2024    ANIONGAP 10 09/16/2024    ANIONGAP 8 08/14/2024    BUN 31 (H) 09/16/2024    BUN 32 (H) 08/14/2024    CREATININE 1.2 09/16/2024    CREATININE 1.2 08/14/2024    GLU 98 09/16/2024     08/14/2024    CALCIUM 9.0 09/16/2024    CALCIUM 9.1 08/14/2024    PH 7.409 11/09/2022    PH 7.320 (L) 11/09/2022    MG 1.8 06/30/2024    MG 1.9 06/29/2024    ALBUMIN 4.4 09/16/2024    ALBUMIN 4.2 08/14/2024    PROT 7.2 09/16/2024    PROT 7.0 08/14/2024    ALKPHOS 150 (H) 09/16/2024    ALKPHOS 138 (H) 08/14/2024    ALT 30 09/16/2024    ALT 36 08/14/2024    AST 35 09/16/2024    AST 43 (H) 08/14/2024    BILITOT 1.3 (H) 09/16/2024    BILITOT 1.2 (H) 08/14/2024        CARDIAC PROFILE - LAST 2  Lab Results   Component Value Date    BNP 66 06/28/2024    CPK 48 12/24/2021    CPKMB 0.7 12/24/2021     11/25/2022    LDH 84 (L) 09/04/2022    TROPONINI <0.030 06/08/2022    TROPONINI <0.030 06/08/2022    TROPONINIHS 7.2 06/28/2024    TROPONINIHS 17.6 (H) 11/22/2022        COAGULATION - LAST 2  Lab Results   Component Value Date    LABPT 17.8 (H) 05/19/2022    LABPT 17.2 (H) 05/15/2022    INR 1.2 11/23/2022    INR 1.1 05/23/2022    APTT 29.6 11/23/2022    APTT 28.2 05/16/2022       ENDOCRINE & PSA - LAST 2  Lab Results   Component Value Date    HGBA1C 6.5 (H) 07/19/2024    HGBA1C 6.8 (H) 06/29/2024    MICROALBUR 112.2  10/18/2018    MICROALBUR 70.4 07/13/2017    TSH 5.329 07/29/2024    TSH 5.496 (H) 01/09/2024    PROCAL 0.12 09/01/2022        ECHOCARDIOGRAM RESULTS  Results for orders placed during the hospital encounter of 11/23/22    Echo    Interpretation Summary  · The left ventricle is normal in size with normal systolic function.  · The estimated ejection fraction is 65%.  · Normal left ventricular diastolic function.  · Normal right ventricular size with normal right ventricular systolic function.  · Mild-to-moderate mitral regurgitation.  · Mild pulmonic regurgitation.  · Normal central venous pressure (3 mmHg).  · The estimated PA systolic pressure is 21 mmHg.  · Trivial posterior pericardial effusion. Just at base      CURRENT/PREVIOUS VISIT EKG  Results for orders placed or performed during the hospital encounter of 06/28/24   EKG 12-lead    Collection Time: 06/28/24  1:27 PM   Result Value Ref Range    QRS Duration 78 ms    [facility] QTC Calculation 464 ms    Narrative    Test Reason : R07.9,    Vent. Rate : 060 BPM     Atrial Rate : 060 BPM     P-R Int : 168 ms          QRS Dur : 078 ms      QT Int : 464 ms       P-R-T Axes : 052 -07 028 degrees     QTc Int : 464 ms    Normal sinus rhythm  Normal ECG  Confirmed by Randy DONOVAN, Mikayla Correa ([id number]) on 7/1/2024 5:34:53 PM    Referred By: KATARZYNA   SELF           Confirmed By:Mikayla Padilla MD     No valid procedures specified.   No results found for this or any previous visit.    No valid procedures specified.          PREVIOUS STRESS TEST              PREVIOUS ANGIOGRAM        PHYSICAL EXAM    CONSTITUTIONAL: NAD  HEENT: No pallor  NECK: no JVD. +trach  LUNGS: CTA b/l  HEART: regular rate and rhythm, S1, S2 normal, no murmur   ABDOMEN:  Nondistended  EXTREMITIES: No edema  NEURO: AAO X 3.  Speech impairment  Psych:  Normal affect    I HAVE REVIEWED :    The vital signs, nurses notes, and all the pertinent radiology and labs.        Current Outpatient Medications    Medication Instructions    acetaminophen (TYLENOL) 325 mg, Oral, Every 6 hours PRN    calcium carbonate 500 mg/5 mL (1,250 mg/5 mL) Take 20 mls by mouth four times daily with meals and nightly.    diclofenac sodium (VOLTAREN ARTHRITIS PAIN) 2 g, Topical (Top), Daily, Apply couple of times a day on the affected arthritis pain.    esomeprazole (NEXIUM) 40 mg, Before breakfast    famotidine (PEPCID) 40 mg/5 mL (8 mg/mL) suspension Give 2.5 mLs (20 mg total) by Per G Tube route 2 (two) times daily.    levothyroxine (SYNTHROID) 125 mcg, Per G Tube, Before breakfast    PEDIATRIC D-YAAKOV 1,000 Units, Oral, Daily    promethazine-dextromethorphan (PROMETHAZINE-DM) 6.25-15 mg/5 mL Syrp 5 mLs, Oral, Every 12 hours PRN        ECG reviewed by me:  Normal sinus rhythm  Assessment & Plan     Extensive coronary calcifications on CT   History of laryngeal cancer and tongue cancer status post trach and PEG  History of AFib currently not on anticoagulation  History of diabetes, hypertension, hyperlipidemia-currently not on medications  Allergic to lovastatin    Denies anginal symptoms.      Plan:  Echo to assess LV function  Exercise nuclear stress test  Lipid profile  Follow up after test results        Follow up in about 1 month (around 11/14/2024).

## 2024-10-18 LAB
OHS QRS DURATION: 84 MS
OHS QTC CALCULATION: 412 MS

## 2024-10-20 ENCOUNTER — PATIENT MESSAGE (OUTPATIENT)
Facility: CLINIC | Age: 74
End: 2024-10-20
Payer: MEDICARE

## 2024-10-20 ENCOUNTER — PATIENT MESSAGE (OUTPATIENT)
Dept: OTOLARYNGOLOGY | Facility: CLINIC | Age: 74
End: 2024-10-20
Payer: MEDICARE

## 2024-10-21 ENCOUNTER — TELEPHONE (OUTPATIENT)
Facility: CLINIC | Age: 74
End: 2024-10-21
Payer: MEDICARE

## 2024-10-21 NOTE — TELEPHONE ENCOUNTER
----- Message from Bere sent at 10/21/2024 11:50 AM CDT -----  Pt sent a msg through portal as well as called. They are needing a letter from our office saying that he needs to carry his formula and water on the plane. They are leaving tomorrow for an emergency flight.     [phone]

## 2024-11-08 ENCOUNTER — PATIENT MESSAGE (OUTPATIENT)
Dept: OTOLARYNGOLOGY | Facility: CLINIC | Age: 74
End: 2024-11-08
Payer: MEDICARE

## 2024-11-18 ENCOUNTER — LAB VISIT (OUTPATIENT)
Dept: LAB | Facility: HOSPITAL | Age: 74
End: 2024-11-18
Attending: INTERNAL MEDICINE
Payer: MEDICARE

## 2024-11-18 ENCOUNTER — TELEPHONE (OUTPATIENT)
Facility: CLINIC | Age: 74
End: 2024-11-18
Payer: MEDICARE

## 2024-11-18 DIAGNOSIS — C32.9 LARYNGEAL CANCER: ICD-10-CM

## 2024-11-18 DIAGNOSIS — D50.0 IRON DEFICIENCY ANEMIA DUE TO CHRONIC BLOOD LOSS: ICD-10-CM

## 2024-11-18 LAB
ALBUMIN SERPL BCP-MCNC: 4.2 G/DL (ref 3.5–5.2)
ALP SERPL-CCNC: 290 U/L (ref 55–135)
ALT SERPL W/O P-5'-P-CCNC: 44 U/L (ref 10–44)
ANION GAP SERPL CALC-SCNC: 9 MMOL/L (ref 8–16)
AST SERPL-CCNC: 35 U/L (ref 10–40)
BASOPHILS # BLD AUTO: 0.02 K/UL (ref 0–0.2)
BASOPHILS NFR BLD: 0.4 % (ref 0–1.9)
BILIRUB SERPL-MCNC: 0.9 MG/DL (ref 0.1–1)
BUN SERPL-MCNC: 30 MG/DL (ref 8–23)
CALCIUM SERPL-MCNC: 8.7 MG/DL (ref 8.7–10.5)
CHLORIDE SERPL-SCNC: 99 MMOL/L (ref 95–110)
CO2 SERPL-SCNC: 29 MMOL/L (ref 23–29)
CREAT SERPL-MCNC: 1 MG/DL (ref 0.5–1.4)
DIFFERENTIAL METHOD BLD: ABNORMAL
EOSINOPHIL # BLD AUTO: 0.2 K/UL (ref 0–0.5)
EOSINOPHIL NFR BLD: 3.5 % (ref 0–8)
ERYTHROCYTE [DISTWIDTH] IN BLOOD BY AUTOMATED COUNT: 12.1 % (ref 11.5–14.5)
EST. GFR  (NO RACE VARIABLE): >60 ML/MIN/1.73 M^2
FERRITIN SERPL-MCNC: 506.6 NG/ML (ref 20–300)
GLUCOSE SERPL-MCNC: 113 MG/DL (ref 70–110)
HCT VFR BLD AUTO: 38.3 % (ref 40–54)
HGB BLD-MCNC: 12.9 G/DL (ref 14–18)
IMM GRANULOCYTES # BLD AUTO: 0.11 K/UL (ref 0–0.04)
IMM GRANULOCYTES NFR BLD AUTO: 2 % (ref 0–0.5)
IRON SERPL-MCNC: 55 UG/DL (ref 45–160)
LYMPHOCYTES # BLD AUTO: 0.6 K/UL (ref 1–4.8)
LYMPHOCYTES NFR BLD: 10.8 % (ref 18–48)
MCH RBC QN AUTO: 31.3 PG (ref 27–31)
MCHC RBC AUTO-ENTMCNC: 33.7 G/DL (ref 32–36)
MCV RBC AUTO: 93 FL (ref 82–98)
MONOCYTES # BLD AUTO: 0.4 K/UL (ref 0.3–1)
MONOCYTES NFR BLD: 7.7 % (ref 4–15)
NEUTROPHILS # BLD AUTO: 4.1 K/UL (ref 1.8–7.7)
NEUTROPHILS NFR BLD: 75.6 % (ref 38–73)
NRBC BLD-RTO: 0 /100 WBC
PLATELET # BLD AUTO: 156 K/UL (ref 150–450)
PMV BLD AUTO: 12.2 FL (ref 9.2–12.9)
POTASSIUM SERPL-SCNC: 4.2 MMOL/L (ref 3.5–5.1)
PROT SERPL-MCNC: 7.1 G/DL (ref 6–8.4)
RBC # BLD AUTO: 4.12 M/UL (ref 4.6–6.2)
SATURATED IRON: 20 % (ref 20–50)
SODIUM SERPL-SCNC: 137 MMOL/L (ref 136–145)
TOTAL IRON BINDING CAPACITY: 276 UG/DL (ref 250–450)
TRANSFERRIN SERPL-MCNC: 197 MG/DL (ref 200–375)
WBC # BLD AUTO: 5.46 K/UL (ref 3.9–12.7)

## 2024-11-18 PROCEDURE — 80053 COMPREHEN METABOLIC PANEL: CPT | Performed by: INTERNAL MEDICINE

## 2024-11-18 PROCEDURE — 84466 ASSAY OF TRANSFERRIN: CPT | Performed by: INTERNAL MEDICINE

## 2024-11-18 PROCEDURE — 82728 ASSAY OF FERRITIN: CPT | Performed by: INTERNAL MEDICINE

## 2024-11-18 PROCEDURE — 36415 COLL VENOUS BLD VENIPUNCTURE: CPT | Performed by: INTERNAL MEDICINE

## 2024-11-18 PROCEDURE — 85025 COMPLETE CBC W/AUTO DIFF WBC: CPT | Performed by: INTERNAL MEDICINE

## 2024-11-18 NOTE — TELEPHONE ENCOUNTER
Reviewed with Briana, per her verbal orders I attempted to call wife to make aware that they should contact PCP concerning this as they will know what to order and how to treat if need be. No answer, LVM with above info.

## 2024-11-18 NOTE — TELEPHONE ENCOUNTER
----- Message from Bere sent at 11/18/2024  9:27 AM CST -----  Pt's wife asking for a lab test for lead. He got a letter from the water co that said that he may have been exposed to lead through the water. He is going today to do labs     [phone]

## 2024-11-19 ENCOUNTER — TELEPHONE (OUTPATIENT)
Dept: FAMILY MEDICINE | Facility: CLINIC | Age: 74
End: 2024-11-19
Payer: MEDICARE

## 2024-11-19 ENCOUNTER — OFFICE VISIT (OUTPATIENT)
Facility: CLINIC | Age: 74
End: 2024-11-19
Payer: MEDICARE

## 2024-11-19 VITALS
DIASTOLIC BLOOD PRESSURE: 67 MMHG | RESPIRATION RATE: 16 BRPM | SYSTOLIC BLOOD PRESSURE: 118 MMHG | BODY MASS INDEX: 22.92 KG/M2 | TEMPERATURE: 97 F | HEART RATE: 58 BPM | WEIGHT: 142 LBS

## 2024-11-19 DIAGNOSIS — Z90.02 S/P LARYNGECTOMY: ICD-10-CM

## 2024-11-19 DIAGNOSIS — N64.4 BREAST PAIN, RIGHT: ICD-10-CM

## 2024-11-19 DIAGNOSIS — D53.9 NUTRITIONAL ANEMIA: ICD-10-CM

## 2024-11-19 DIAGNOSIS — N64.9 LESION OF RIGHT NIPPLE: ICD-10-CM

## 2024-11-19 DIAGNOSIS — C32.9 LARYNGEAL CANCER: Primary | ICD-10-CM

## 2024-11-19 PROCEDURE — 3044F HG A1C LEVEL LT 7.0%: CPT | Mod: CPTII,S$GLB,, | Performed by: NURSE PRACTITIONER

## 2024-11-19 PROCEDURE — 99215 OFFICE O/P EST HI 40 MIN: CPT | Mod: S$GLB,,, | Performed by: NURSE PRACTITIONER

## 2024-11-19 PROCEDURE — 3078F DIAST BP <80 MM HG: CPT | Mod: CPTII,S$GLB,, | Performed by: NURSE PRACTITIONER

## 2024-11-19 PROCEDURE — 3072F LOW RISK FOR RETINOPATHY: CPT | Mod: CPTII,S$GLB,, | Performed by: NURSE PRACTITIONER

## 2024-11-19 PROCEDURE — 1101F PT FALLS ASSESS-DOCD LE1/YR: CPT | Mod: CPTII,S$GLB,, | Performed by: NURSE PRACTITIONER

## 2024-11-19 PROCEDURE — 3074F SYST BP LT 130 MM HG: CPT | Mod: CPTII,S$GLB,, | Performed by: NURSE PRACTITIONER

## 2024-11-19 PROCEDURE — G2211 COMPLEX E/M VISIT ADD ON: HCPCS | Mod: S$GLB,,, | Performed by: NURSE PRACTITIONER

## 2024-11-19 PROCEDURE — 99999 PR PBB SHADOW E&M-EST. PATIENT-LVL IV: CPT | Mod: PBBFAC,,, | Performed by: NURSE PRACTITIONER

## 2024-11-19 PROCEDURE — 1159F MED LIST DOCD IN RCRD: CPT | Mod: CPTII,S$GLB,, | Performed by: NURSE PRACTITIONER

## 2024-11-19 PROCEDURE — 1157F ADVNC CARE PLAN IN RCRD: CPT | Mod: CPTII,S$GLB,, | Performed by: NURSE PRACTITIONER

## 2024-11-19 PROCEDURE — 3288F FALL RISK ASSESSMENT DOCD: CPT | Mod: CPTII,S$GLB,, | Performed by: NURSE PRACTITIONER

## 2024-11-19 PROCEDURE — 1126F AMNT PAIN NOTED NONE PRSNT: CPT | Mod: CPTII,S$GLB,, | Performed by: NURSE PRACTITIONER

## 2024-11-19 PROCEDURE — 3008F BODY MASS INDEX DOCD: CPT | Mod: CPTII,S$GLB,, | Performed by: NURSE PRACTITIONER

## 2024-11-19 NOTE — TELEPHONE ENCOUNTER
----- Message from Marta sent at 11/18/2024  9:55 AM CST -----  Contact: Janel  Pt wants an order for a blood test for lead added to his current orders. Please advise. Janel @[phone]

## 2024-11-19 NOTE — PROGRESS NOTES
[facility] Hematology/Oncology  PROGRESS NOTE -  Follow-up Visit      Subjective:       Patient ID:   NAME: Cyrus Batres Jr. : 1950     74 y.o. 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  The patient is here today to go over the results of the recently ordered labs, tests and studies. He is here by himself.    He previously had had completed chemotherapy and  XRT.      He saw Dr James with ENT again 2024 with DL with good report per patient.     He saw Dr Patterson on 2024; Dena Silveira NP with endocrine in 2024    He repeat CT scan on 2024 that showed no evidence of disease.     He met with cardiology to discuss cardiac calcifications.     He is doing well and is active at home.      He is breathing ok. No HA's or CP; no pain at this time     Today he does complain of new right nipple pain.       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 He had his portacath removed on  and replaced on 2023 by Dr Le            Discussed covid19 and he has been vaccinated      ROS:   GEN: normal without any fever, night sweats or weight loss; doing well with tube feeds   HEENT: normal with no HA's, sore throat, stiff neck, changes in vision  CV: normal with no CP, SOB, PND, no current GRIFFIN  PULM: normal with no SOB, cough, hemoptysis, sputum or pleuritic pain  GI: no  current N/V  ; has peg tube;    : normal with no hematuria, dysuria  BREAST: normal with no mass, discharge, pain + new right nipple pain  SKIN: normal with no rash, erythema, bruising, or swelling     Pain Scale:  0    Allergies:  Review of patient's allergies indicates:   Allergen Reactions    Lovastatin Itching    Pollen extracts     Lovastatin Rash     Not confirmed but pt skeptical       Medications:    Current Outpatient Medications:     acetaminophen (TYLENOL) 325 MG tablet, Take 325 mg by mouth every 6 (six) hours as needed for Pain., Disp: , Rfl:     calcium carbonate 500 mg/5 mL (1,250 mg/5 mL), Take 20 mls by mouth four times daily with meals and nightly., Disp: 2300 mL, Rfl: 12    diclofenac sodium (VOLTAREN ARTHRITIS PAIN) 1 % Gel, Apply 2 g topically once daily. Apply couple of times a day on the affected arthritis pain., Disp: 100 g, Rfl: 1    esomeprazole (NEXIUM) 40 MG capsule, 40 mg before breakfast., Disp: , Rfl:     levothyroxine (SYNTHROID) 125 MCG tablet, 1 tablet (125 mcg total) by Per G Tube route before breakfast., Disp: 90 tablet, Rfl: 3    promethazine-dextromethorphan (PROMETHAZINE-DM) 6.25-15 mg/5 mL Syrp, Take 5 mLs by mouth every 12 (twelve) hours as needed (cough and cogestion)., Disp: 300 mL, Rfl: 5    famotidine (PEPCID) 40 mg/5 mL (8 mg/mL) suspension, Give 2.5 mLs (20 mg total) by Per G Tube route 2 (two) times daily., Disp: 50 mL, Rfl: 11    PMHx/PSHx Updates:  See patient's last visit with Dr. Tamez on 8/20/2024  See H&P on 9/23/2022        Pathology:   Cancer Staging   Larynx cancer  Staging form: Larynx - Glottis, AJCC 8th Edition  - Clinical stage from 8/6/2020: Stage II (cT2, cN0, cM0) - Signed by Fariha Muñoz NP on 8/6/2020  - Pathologic stage from 1/20/2022: Stage LOU (pT4a, pN0, cM0) - Signed by Fariha Muñoz NP on 1/20/2022  Staging form: Pharynx - P16 Negative Oropharynx, AJCC 8th Edition  - Clinical: Stage II (rcT2, cN0, cM0) - Signed by Matheus Portillo  "MD Keon on 5/30/2022    Malignant neoplasm of base of tongue  Staging form: Pharynx - P16 Negative Oropharynx, AJCC 8th Edition  - Clinical stage from 5/20/2022: Stage II (cT2, cN0, cM0, p16-) - Signed by Saúl Kessler MD on 7/7/2022    Dissection 11/9/2022:    Final Pathologic Diagnosis 1. "left submandibular lymph node", dissection:       - Three lymph nodes, negative for carcinoma (0/3)   2. 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       - Tumor size: 4.5 cm       - Resection margins: left superior pharyngeal margin focally involved;   all other margins are negative for carcinoma       - Left internal jugular vein: free of tumor       - One lymph node, negative for carcinoma (0/1)   Note: P16 immunostain is negative     Tumor Site       Oropharynx  involving Base of tongue       Tumor Laterality       Midline       Tumor Size       Greatest Dimension (Centimeters) 4.5 cm         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)       Histologic Grade       G2 to G3: Moderate to Poorly differentiated       Lymphovascular Invasion       Not identified       Perineural Invasion       Not identified     MARGINS      Margins       Involved by invasive tumor     Margin(s)   Involved by Invasive Tumor:      left superior pharyngeal margin; all other   margins are free of tumor     LYMPH NODES    Regional Lymph Node Status:    :     Regional Lymph Node   Status:      All regional lymph nodes negative for tumor      pT Category:               pT3   pN Category:               pN0      Base of Tongue Biopsy  4/27/2022:  Final Pathologic Diagnosis BIOPSY OF BASE OF THE TONGUE:   THE INFILTRATING POORLY DIFFERENTIATED SQUAMOUS CELL CARCINOMA   THE TUMOR IS P16 NEGATIVE.  THE POSITIVE AND NEGATIVE CONTROLS STAINED   APPROPRIATELY      Larynx resection/thyroidectomy 1/6/2022:     Final Pathologic " Diagnosis 1. Lymph nodes, left neck levels 2,  3 and 4, dissection:   - Nineteen lymph nodes, all  negative  for metastatic carcinoma (0/19)   2. Lymph nodes, right neck levels 2,  3 and 4, dissection:   - Twenty-eight lymph nodes, all  negative  for metastatic carcinoma (0/28)   3. Possible parathyroid gland, excision:   - Benign parathyroid gland tissue (0.003 g)   4. 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 Soft tissue, posterior tracheal margin, excision:   - Benign, focally reactive respiratory mucosa with submucosal acute   inflammation,  negative  for dysplasia or malignancy   6. Soft tissue, anterior tracheal margin, excision:   - Benign respiratory mucosa with subepithelial cartilage,  negative  for   dysplasia or malignancy   7. Soft tissue, posterior tracheal margin #2, excision:   - Benign, focally reactive respiratory mucosa with submucosal acute   inflammation,  negative  for dysplasia or malignancy   8. Soft tissue, anterior tracheal margin #2, excision:   - Benign, reactive focally ulcerated respiratory mucosa with submucosal acute   inflammation,  negative  for dysplasia or malignancy             Laryngoscope 8/4/2020: (with Dr Noel):  Final Pathologic Diagnosis 1.  Left true vocal fold, biopsy:       -  Invasive moderately differentiated squamous cell carcinoma,   keratinizing type   2.  Left true vocal fold, biopsy:       -  Invasive moderately differentiated squamous cell carcinoma,   keratinizing type             Laryngoscope  3/17/2020 (with Dr Xiao):  Final Pathologic Diagnosis 1.  BIOPSY OF LEFT TRUE VOCAL CORD:   SEVERELY DYSPLASTIC APPEARING SQUAMOUS MUCOSA   INVASIVE CARCINOMA IS NOT DOCUMENTED   ON THE OTHER HAND, THESE FRAGMENTS ARE NODUL AND WITHOUT SUBMUCOSA FOR   EVALUATION; IT IS POSSIBLE THAT THEY REFLECT INVASIVE SQUAMOUS CARCINOMA   2.  BIOPSY OF LEFT ANTERIOR COMMISSURE:   MODERATE DYSPLASIA   NO INVASIVE CARCINOMA IDENTIFIED             Objective:     Vitals:  Blood pressure 118/67, pulse (!) 58, temperature 97.2 °F (36.2 °C), temperature source Temporal, resp. rate 16, weight 64.4 kg (142 lb).    Physical Examination:   GEN: no apparent distress, comfortable; AAOx3  HEAD: atraumatic and normocephalic  EYES: no pallor, no icterus, PERRLA  ENT: OMM, no pharyngeal erythema, external ears WNL; no nasal discharge; no thrush; s/p laryngectomy with flap since healed well; trach   NECK: no masses, thyroid normal, trachea midline, no LAD/LN's, supple; post-op dissection healed well;    CV: RRR with no murmur; normal pulse; normal S1 and S2; no pedal edema; portacath   CHEST: Normal respiratory effort; CTAB; normal breath sounds; no wheeze or crackles  ABDOM: nontender and nondistended; soft; normal bowel sounds; no rebound/guarding; peg tube  MUSC/Skeletal: ROM normal; no crepitus; joints normal; no deformities or arthropathy  EXTREM: no clubbing, cyanosis, inflammation or swelling  SKIN: no rashes, lesions, ulcers, petechiae or subcutaneous nodules  : no mahan  NEURO: grossly intact; motor/sensory WNL; AAOx3; no tremors  PSYCH: normal mood, affect and behavior  LYMPH: normal cervical, supraclavicular, axillary and groin LN's    Breast: - right nipple fullness and different than left             Labs:     Lab Results   Component Value Date    WBC 5.46 11/18/2024    HGB 12.9 (L) 11/18/2024    HCT 38.3 (L) 11/18/2024    MCV 93 11/18/2024     11/18/2024     CMP  Sodium   Date Value Ref Range Status   11/18/2024 137 136 -  145 mmol/L Final     Potassium   Date Value Ref Range Status   11/18/2024 4.2 3.5 - 5.1 mmol/L Final     Chloride   Date Value Ref Range Status   11/18/2024 99 95 - 110 mmol/L Final     CO2   Date Value Ref Range Status   11/18/2024 29 23 - 29 mmol/L Final     Glucose   Date Value Ref Range Status   11/18/2024 113 (H) 70 - 110 mg/dL Final     BUN   Date Value Ref Range Status   11/18/2024 30 (H) 8 - 23 mg/dL Final     Creatinine   Date Value Ref Range Status   11/18/2024 1.0 0.5 - 1.4 mg/dL Final     Calcium   Date Value Ref Range Status   11/18/2024 8.7 8.7 - 10.5 mg/dL Final     Total Protein   Date Value Ref Range Status   11/18/2024 7.1 6.0 - 8.4 g/dL Final     Albumin   Date Value Ref Range Status   11/18/2024 4.2 3.5 - 5.2 g/dL Final   11/18/2020 3.7 3.6 - 5.1 g/dL Final     Comment:     For additional information, please refer to   http://education.OpTrip/faq/CGF259 (This link is   being provided for informational/ educational purposes only.)  This test was developed and its analytical performance   characteristics have been determined by [facility]. It has not been cleared or approved by the   US Food and Drug Administration. This assay has been validated   pursuant to the CLIA regulations and is used for clinical   purposes.  @ Test Performed By:  [facility] [place]  Yo Cortes M.D.,   [address] [place] [address]  [facility]  [id number]       Total Bilirubin   Date Value Ref Range Status   11/18/2024 0.9 0.1 - 1.0 mg/dL Final     Comment:     For infants and newborns, interpretation of results should be based  on gestational age, weight and in agreement with clinical  observations.    Premature Infant recommended reference ranges:  Up to 24 hours.............<8.0 mg/dL  Up to 48 hours............<12.0 mg/dL  3-5 days..................<15.0 mg/dL  6-29 days.................<15.0 mg/dL       Alkaline  Phosphatase   Date Value Ref Range Status   11/18/2024 290 (H) 55 - 135 U/L Final     AST   Date Value Ref Range Status   11/18/2024 35 10 - 40 U/L Final     ALT   Date Value Ref Range Status   11/18/2024 44 10 - 44 U/L Final     Anion Gap   Date Value Ref Range Status   11/18/2024 9 8 - 16 mmol/L Final     eGFR if    Date Value Ref Range Status   07/29/2022 >60.0 >60 mL/min/1.73 m^2 Final     eGFR if non    Date Value Ref Range Status   07/29/2022 >60.0 >60 mL/min/1.73 m^2 Final     Comment:     Calculation used to obtain the estimated glomerular filtration  rate (eGFR) is the CKD-EPI equation.                   Radiology/Diagnostic Studies:      US abdomen: 9/20/2024  Impression:     Negative ultrasound of the liver     No evidence bowel stent        Electronically signed by:Shira Menendez  Date:                                            09/20/2024  Time:                                           08:42      8/28/2024: CT neck     Impression:     Extensive postsurgical changes within the soft tissues of the neck without evidence of local recurrence of malignancy.     No evidence of metastatic disease.     Multiple incidental findings as noted above.  Multifocal narrowing of the intracranial segment of the right vertebral artery and extensive coronary arterial calcification is again observed.        Electronically signed by:Jesika Atkins  Date:                                            08/28/2024  Time:                                           14:01        Exam Ended: 08/28/24 13:31 CDT       PET 2/7/2024:  IMPRESSION:     Extensive postoperative changes of the neck as outlined above, stable compared to prior PET/CT.  No convincing evidence of residual/recurrent FDG avid malignancy.     Mild FDG avidity of the proximal thoracic esophagus near the operative site, decreased compared to prior.     FDG avidity of arthritic right sternoclavicular joint          Ct Chest/Neck  9/21/2023:    IMPRESSION:     1. Extensive postsurgical changes throughout the neck as described, with no evidence of residual or recurrent malignancy.  2. Negative for metastatic disease throughout the neck or the chest.  3. 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  4. Coronary artery calcifications.        PET 5/30/2023:    IMPRESSION: Postsurgical changes from total glossectomy, laryngectomy and pharyngectomy with bilateral neck dissections     There is resolution of the previously noted fluid collections within the neck. There is mild FDG activity in the left side the neck adjacent to the neoesophagus as well as at the tracheostomy site to the right of the neoesophagus. Findings most likely are secondary to postsurgical and postinfection changes. There is no focal mass or adenopathy     No evidence of distant metastasis             CTA Neck    Result Date: 9/20/2022  EXAMINATION: CTA NECK CLINICAL HISTORY: Head/neck cancer, monitor;Malignant neoplasm of base of tongue TECHNIQUE: CT angiogram was performed from the level of the scott to the EAC following the IV administration of 75mL of Omnipaque 350.   Sagittal and coronal reconstructions and maximum intensity projection reconstructions were performed. Arterial stenosis percentages are based on NASCET measurement criteria. COMPARISON: CTA neck dated 05/20/2022 FINDINGS: Aortic arch and great vessels: There is a conventional left-sided 3 vessel arch.  The aortic arch is widely patent.  There is stable soft and calcified plaque in the proximal left subclavian artery with a similar appearance the prior study and possibly within radiation field but without critical stenosis or occlusion.  The right subclavian artery is patent.  The brachiocephalic trunk and origin of the left common carotid artery are patent. Carotid arteries Right carotid artery: There is calcified plaque scattered in the right common carotid artery without critical stenosis,  occlusion or change.  There is no measurable stenosis of the internal carotid artery which is patent to the skull base. Left carotid artery: There is mild plaque scattered in the left common carotid artery without change and without critical stenosis or occlusion.  There is no measurable stenosis of the internal carotid artery. Vertebral arteries: The left vertebral artery is dominant and patent throughout its course in the neck.  The right vertebral artery is hypoplastic but patent.  There is no critical stenosis, occlusion, thrombus or dissection.  There is no change. The study extends intracranially.  There is calcified plaque in the V4 segment of the left vertebral artery resulting in a moderate to marked short segment nonocclusive stenosis.  The right vertebral artery partially terminates in a posteroinferior cerebellar artery with a short segment moderate to marked stenosis of the very distal right vertebral artery.  Some flow within the distal right vertebral artery may represent retrograde flow from the dominant left vertebral artery.  The basilar artery is patent.  The origins of the superior cerebellar and posterior cerebral artery on the right are patent.  There is fetal origin of the left posterior cerebral artery which is patent. There is plaque in the cavernous segments of both internal carotid arteries but there is no critical stenosis or large vessel occlusion of the anterior circulation vessels.  There is no large aneurysm. Other: There is a similar appearance of the included upper lungs with pleural and parenchymal changes anteriorly in the upper lobes bilaterally.  As previously discussed, timing of the contrast bolus is performed during the arterial phase for CTA neck.  Therefore, enhancement of the soft tissues is somewhat suboptimal due to this technique. There has been a large region of soft tissue resection in the anterior mid and lower neck soft tissues with continued open defect in the  upper chest and lower neck above the manubrium.  Soft tissue seen along the left posterolateral border of the trachea could represent secretions or mucus.  This was present previously. Redemonstrated is a large heterogeneously enhancing lesion centered at the level of the tongue base and floor of the mouth centrally into the left.  There is scattered calcifications.  The prior CTA demonstrated regions of central necrosis which are no longer definitely present but diffusely heterogeneous density and enhancement likely represents regions of necrosis.  This large heterogeneously enhancing soft tissue mass extends more anteriorly in the floor of the mouth on the left and measures at least 5.6 cm in greatest anterior to posterior dimension with 3.6 cm transverse dimension.  This does extend anteriorly to the medial margin of the body of the mandible on the left. There are post treatment changes with stranding in the neck fat.     1. Similar appearance of the neck vessels when compared to the prior CTA neck with mild narrowing at the origin of the left subclavian artery.  There is no critical stenosis, occlusion, thrombosis or dissection involving the cervical vertebral or carotid arteries. 2. Larger mass within the hypopharynx and tongue base extending anteriorly to the floor of the mouth on the left to the medial margin of the body of the mandible without obvious bony destruction. 3. There is nonocclusive stenosis of the distal V4 segment of the left vertebral artery without change. 4. There is no large vessel occlusion or critical stenosis of the anterior circulation. 5. There is developmental variation with fetal origin of the left posterior cerebral artery. Electronically signed by: Rex Joyner MD Date:    09/20/2022 Time:    11:31    CT Abdomen Pelvis With Contrast    Result Date: 9/1/2022  CMS MANDATED QUALITY DATA - CT RADIATION - 436 All CT scans at this facility utilize dose modulation, iterative  reconstruction, and/or weight based dosing when appropriate to reduce radiation dose to as low as reasonably achievable. Reason: abdominal pain partial history of laryngeal carcinoma TECHNIQUE: CT abdomen and pelvis with 100 mL Omnipaque 350. COMPARISON: PET/CT 5/13/2022 CT ABDOMEN: Coronary artery calcification and extremely tiny hiatal hernia incidentally noted. Visualized lung bases are clear. Liver, gallbladder, pancreas, spleen, adrenals, and kidneys are normal. Mild aortoiliac calcifications present. Gastrostomy tube tip lies in distal gastric body. Small intestines are unremarkable. Mild wall thickening affects the ascending colon with pneumatosis intestinalis diffusely affecting the ascending colon. No other free intraperitoneal gas or, and remainder of colon is normal. A normal appendix is present. The major mesenteric vascular structures are patent. No acute osseous abnormality. CT PELVIS: Prostate is slightly enlarged. Bladder is normal. No free pelvic fluid. Tiny right and small to moderate left fat-containing inguinal hernias are present. No acute osseous abnormality. IMPRESSION: 1. Pneumatosis intestinalis affecting the ascending colon with associated mild wall thickening. Etiology of this is undetermined. Potential considerations include intestinal ischemia or pneumatosis related to chemotherapy. Clinical and laboratory correlation is needed to assess significance. No portal venous gas or free intraperitoneal air however. 2. Coronary artery calcifications Electronically signed by:  Nael Hui MD  9/1/2022 6:20 PM CDT Workstation: [id number]    NM PET CT Routine Skull to Mid Thigh    Result Date: 9/27/2022  PET CT WITH IMAGE FUSION HISTORY:  restage tongue cancer RESTAGING...  HX: TONGUE CA.  DX: April 2022.  LAST CHEMO TREATMENT WAS 3WKS AGO.  EVALUATE TX RESPONSE.   ALSO HX OF LARYNGEAL CA IN AUGUST 2020.  MULTIPLE SURGERIES: LARYNGECTOMY, THYROIDECTOMY & TRACHEOSTOMY.  RAD TX WAS COMPLETED IN NOV  2020. TECHNIQUE: Following IV administration of 11.2 mCi of F-18 labeled FDG into right antecubital fossa and a 60 minute delay, PET CT was performed from the vertex of the skull through the proximal thighs with an integrated PET CT scanner with image fusion. CT images were obtained to aid in attenuation correction and PET localization. The patient's serum glucose at the time of the exam was 136 mg/dL. COMPARISON: PET/CT 5/13/2022 FINDINGS: Poorly characterized soft tissue mass involving base of tongue approximately measures 4.3 x 2.8 cm (series 3 image 65) appearing similar to the prior exam. This reaches current max SUV of 11.8, not significantly changed from prior of 11.4. No other abnormal FDG activity. Intracranial compartment is unremarkable. Trace bilateral maxillary sinus mucosal thickening is present. Postoperative changes of laryngectomy and tracheostomy are present. Surgical clips occur throughout the neck. No definite enlarged cervical or supraclavicular lymph node, with evaluation limited by lack of IV contrast. Left subclavian port catheter tip terminates in SVC. Diffuse coronary artery calcifications are present. No enlarged mediastinal or axillary lymph nodes. No pulmonary nodule or mass. Gastrostomy tube tip lies in gastric body. Moderate aortoiliac calcifications are present. Small fat-containing left inguinal hernia incidentally noted. Mild degenerative changes affect the spine. No suspicious osseous abnormality. IMPRESSION: 1. No significant change in the FDG avid mass involving the tongue base, remaining characteristic of malignancy. 2. No new FDG avid malignancy or metastatic disease. Electronically signed by:  Nael Hui MD  9/27/2022 2:38 PM CDT Workstation: [id number]    CT Abdomen Pelvis  Without Contrast    Result Date: 9/4/2022  CMS MANDATED QUALITY DATA - CT RADIATION  436 All CT scans at this facility utilize dose modulation, iterative reconstruction, and/or weight based dosing when  appropriate to reduce radiation dose to as low as reasonably achievable. CT ABDOMEN PELVIS WITHOUT IV CONTRAST CLINICAL HISTORY: 72 years Male Follow-up pneumatosis COMPARISON: CT abdomen and pelvis September 1, 2022 FINDINGS: Imaging through the lower thorax demonstrates subsegmental atelectasis of the dependent lower lobes. Coronary artery calcification. Bone window images show no acute or aggressive osseous abnormality. Transitional lumbosacral vertebral body. On this unenhanced exam, no focal hepatic lesion. Gallbladder and biliary tree are unremarkable. Spleen appears normal. Pancreas is unremarkable. No adrenal lesion. No renal calculi or hydronephrosis. Ureters are normal in caliber. Urinary bladder is within normal limits. Gastrostomy tube is in place within the stomach. Stomach is largely collapsed. No evidence of small bowel obstruction. Pneumatosis and pericolonic abscess involving the ascending colon is redemonstrated, slightly diminished compared to prior. Mild wall thickening throughout the colon. No free fluid or free air within the abdomen or pelvis. Aortoiliac atherosclerotic calcification. No pathologically enlarged lymph nodes within the abdomen or pelvis. Bilateral fat-containing inguinal hernias, larger on the left. IMPRESSION: Pneumatosis and pericolonic gas about the ascending colon has decreased in volume compared to prior. Persistent colonic wall thickening which could indicate colitis. Coronary and aortic atherosclerosis. Gastrostomy tube is within the stomach. Bilateral inguinal hernias. Electronically signed by:  Jose De Jesus Oleary MD  9/4/2022 4:06 PM CDT Workstation: [id number]    CTA neck  5/20/2022:     IMPRESSION:  Persistent mass within the hypopharynx consistent with malignancy.  By my measurements it is larger than on April 24, 2022 with central necrosis.  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 This is similar to the  previous April 2022 study.  No evidence of arterial compromise related to malignancy.     PET 5/13/2022:     IMPRESSION:  1. Postoperative changes of prior laryngectomy and lymph node resection/radiation.  2. Masslike FDG avid lesion to the left of midline at the tongue base concerning for residual/recurrent disease.  3. Adjacent confluent nodular extension along the inferior left side of the mass.  4. No FDG avid lymphadenopathy or convincing additional sites of disease in the chest, abdomen or pelvis.     CT head 5/10/2022:  FINDINGS: Comparison to multiple prior exams. There is no acute intracranial hemorrhage, with no mass effect or abnormal extra-axial fluid. Mild scattered areas of nonspecific hypoattenuation involve the deep periventricular white matter, with gray-white differentiation maintained.     There is mild generalized prominence of the cortical sulci and ventricles. The cerebellum and brainstem are unremarkable. There are carotid siphon vascular calcifications. The visualized paranasal sinuses and mastoid air cells are clear. There is no acute calvarial fracture or scalp hematoma.     IMPRESSION: No acute intracranial hemorrhage or acute calvarial fracture     CT soft neck 4/24/2022;     Oral tongue/tongue base:  At the base of the tongue on the left there are findings of a heterogeneously enhancing mass measuring 2.1 cm highly concerning for a neoplastic process.     True and false cords:  Patient status post laryngectomy which has been performed in the interim. Soft tissue stranding in the lower neck likely related to a previous flat reconstruction. No enhancement or lymphadenopathy within the lower neck.     Lymph node assessment:Negative     Surrounding soft tissues:  Negative     Vasculature:  Negative     Osseous structures:  Negative     Lung apices:  Scarring involving the lung apices bilaterally likely related to previous radiation.     IMPRESSION:  1. Postoperative and radiation changes  involving the lower neck.  2. Findings highly concerning for a developing mass at the left base of the tongue enhancing 2.1 cm region. Direct visualization in this region would be of benefit.        CT soft neck  12/24/2021  IMPRESSION:  1.  Laryngeal mass as on prior exam. Laryngeal airway narrowing has not changed.  2.  No evidence for active hemorrhage.  3.  Partially visualized patchy groundglass infiltrates in both upper lobes. Also see CT chest report     CTA Chest 12/24/2021:  IMPRESSION:     1.  No pulmonary embolism.     2.  Soft tissue stranding in the region of the strap musculature with ill-defined hypodensity measuring 2.8 x 1.4 cm in the cervical midline.  Findings concerning for infectious process or abscess. Neoplastic process could have similar imaging characteristics.     3.  Suggested erosion of the proximal right parasymphyseal aspect of the thyroid shield.  Correlated with CT soft tissue neck findings. Findings could represent osteomyelitis. Neoplastic process cannot be excluded.     4.  Hepatic steatosis.  5.  Thickening of the gastric wall. Prominence of perigastric vasculature. Small hiatal hernia.     6.  Moderate stool burden.  Correlate for constipation.        PET 12/15/2021:  IMPRESSION:     Substantial qualitative and quantitative increase of hypermetabolic FDG activity associated with the larynx in this patient with known supraglottic neoplasm.     No evidence of FDG avid metastatic disease involving neck, chest, abdomen or pelvis.     PET 8/21/2020:     Impression:     1. Intensely hypermetabolic plaque-like mass along the left true vocal cord, compatible with known laryngeal carcinoma.  2. No findings of regional metastatic disease in the neck, or distant metastatic disease.        CT Chest 8/10/2020:     IMPRESSION:     No metastatic disease within the chest.  Three-vessel coronary artery calcification. Nondilated cardiac  chambers     CT Soft neck 7/22/2020:  IMPRESSION:  1. Slight  interval increase in size of the previously described  enhancing nodule along the anterior left vocal cord.  2. No pathologic lymphadenopathy.  3. Additional and incidental findings as noted above.     CT Soft neck  3/16/2020:     Impression:     6 mm enhancing focus involving the superior aspect of the left focal cord near the anterior commisure.  Recommend direct visualization for further evaluation of laryngeal polyp     Question of 2 mm submucosal lipoma involving the midportion of the superior aspect of the left vocal cord.     Mild arteriosclerosis involving the brachiocephalic arteries and carotid arteries     Mild degenerative change of the cervical spine        I have reviewed all available lab results and radiology reports.    Assessment/Plan:   (1) 74 y.o. 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     - Patient has been under the care of Dr Khoobehi with [facility] and Dr Portillo with rad/onc.   - He was recently found to have a new primary cancer involving the base of his tongue in April 2022. He was deemed not to be a candidate for any further radiation and did not want to undergo any further surgery as this would necessitate a glossectomy procedure.   - He has been on adjuvant chemotherapy per direction of Dr Khoobehi and has had 3 cycles. His therapy course has been complicated with persistent diarrhea and N/V.      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 He saw Dr Lucero  with Rad/onc this am. [place] [facility] [place] James about surgical options. If he is not a surgical candidate then will proceed with cisplatin and XRT combine therapy.     10/6/2022:  - He saw Dr Lucero with Rad/onc, and Dr James and his case was [place] [facility] [place]  he general consensus was to proceed to surgery.  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   - He had his portacath removed on 11/28.   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, keratinizing type, moderate to poorly differentiated tumor  - Four LN's were all negative  - he did have a positive left superior pharyngeal margin  - pT3 pN0  - reviewed the latest NCCN guidelines again from Version 1.2023; he may need some further systemic therapy due to the margin  - check on Rad/onc follow-up     1/23/2023:  - s/p new portacath placed on 1/12/2023 with Dr Le  - starting combined chemotherapy and XRT - cisplatin  - s/p chemotherapy school with [facility]    2/6/2023:  - he seems to be tolerating the chemotherapy well at this time  - continued with concomitant therapy with XRT    2/22/2023:  - he seems to be tolerating the chemotherapy well at this time  - continued with concomitant therapy with XRT  - labs relatively adequate  - will check on his refills    3/6/2023:  - finishing up XRT this comking Thursday  - last chemotherapy today    4/3/2023:  - He has since completed chemotherapy and  XRT.  He  seems to have tolerated the regimen fairly well with no new complaints.  Some weight loss but reports he is staying hydrated. He does have some occasional GRIFFIN.   - He is seeing ENT tomorrow with Dr James    5/31/2023:  - He has since completed chemotherapy and  XRT.  He seems to have tolerated the regimen fairly well . He saw Dr James with ENT on 5/2/2023 and had repeat scope with good report per patient.  - He had recent PET yesterday on 5/30 which overall looks adequate    8/23/2023:  - sees Dr James again on 9/5/2023  - will set up repeat CT neck and chest for Sept 2023  - reach out to dietary about foods he can eat that are higher in iron  - consider IV iron x2 (he can not swallow pills)  - see if we can renew PT    11/15/2023:  - He previously had completed chemotherapy and  XRT.  He seems to have tolerated the regimen fairly well . He saw Dr James with ENT on 11/72023 and had repeat scope with good report per patient.  - He last saw Dr Patterson on 8/3/2023 and sees him again in Jan 2024, Dr Portillo on 6/19/2023  - He had last PET on 5/30  and CT Chest/Neck in Sept 2023  - he missed one IV iron  - due for up to date labs    2/20/2024:  - He saw Dr James with ENT 2/6/2024 with DL with good report per patient. He sees him again in 3 months  - He saw Dr Patterson on 2/5/2024  - He had PEt on 2/7/2024   - some residual thrombocytopenia post-chemotherapy - monitor for now as this may take some time to recover     5/20/2024:  - He saw Dr James with ENT 5/7/2024 with DL with good report per patient. He sees him again on 6/18/2024 for ear flush.   - He saw Dr Patterson on 5/10/2024; Dena Silveira NP with endocrine in April 2024  - he had tube exchange with Dr Cooper in April 2024  - still having some hypocalcemia issues  - he declined referral to podiatrist for his right ankle issues    8/20/2024:  - he recently saw Dr James and had repeat LS  - sees Dr James again in Sepot 2024  - will order f/u CT chest/neck  - mild  anemia and mild thrombocytopenia - will continue to monitor    11/19/2024:   - CT scan reviewed with patient   - new right nipple pain   - mammogram and US            (2) Hx/of Laryngeal cancer in Aug 2020 stage II (cT2 N0)  - s/p radiation followed by bilateral neck dissection and total thyroidectomy     Brief Oncology Summary for his laryngeal CA hx:     Patient was noted to initially have a suspicious lesion on the left true vocal cord by laryngoscopy with Dr Xiao in March 2020 with biopsy showing severe dysplastic changes without definitive invasive process. He had a CT scan on 3/16/2020 which showed a 6mm nodule on the left vocal cord. A repeat Ct scan four months later on 7/22/2020 showed the nodule enlarged to 1.4 x 1.1 cm in size. Patient was then seen by Dr Noel and underwent repeat scope in Aug 2020 who found a bulky deeply invading tumor which was debulked and the pathology coming back moderately differentiated SCCA without lymphovascular or perineural invasion. Hs case was subsequently presented to the tumor board and the consensus was to proceed with radiation. He completed XRT on 10/30/2020 and proceeded with regular laryngoscopy surveillance. He eventually required salvage laryngectomy and neck dissection with flap with Dr James on Jan 6th 2022. 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 Fifty lymph nodes were removed which were all negative. Pathology TNM was pT4a, pN0. Patient did not require any adjuvant therapy afterwards.      (2) HTN and hypercholesterolemia     (3) Paroxysmal atrial fibrillation, V tach - seen by Anne Ugarte NP     (4) DM II     (5) B12 deficiency      (6) Fatty liver disease, GERD           VISIT DIAGNOSES:      Laryngeal cancer    Breast pain, right  -     Mammo Digital Diagnostic Bilat; Future; Expected date: 11/19/2024  -     US Breast  Right Complete; Future; Expected date: 11/19/2024    Lesion of right nipple  -     Mammo Digital Diagnostic Bilat; Future; Expected date: 11/19/2024  -     US Breast Right Complete; Future; Expected date: 11/19/2024    S/P laryngectomy            PLAN:  continue with ENT and rad/onc f/u as directed by them respectively - sees Dr James again in December  Continue to monitor labs and resume IV iron as needed   s/p chemotherapy school with me - discussed the side-effect profile, provided literature and obtained consents  F/u Rad/onc follow-up as directed  Check labs monthly for now  F/u with PCP, ENT, etc  Dietician f/u on the tube feeds   Add B12 to labs   Mammogram and US to right breast due to new breast pain     RTC in 4 weeks with me       Discussion:     COVID-19 Discussion:     I had long discussion with patient and any applicable family about the COVID-19 coronavirus epidemic and the recommended precautions with regard to cancer and/or hematology patients. I have re-iterated the CDC recommendations for adequate hand washing, use of hand -like products, and coughing into elbow, etc. In addition, especially for our patients who are on chemotherapy and/or our otherwise immunocompromised patients, I have recommended avoidance of crowds, including movie theaters, restaurants, churches, etc. I have recommended avoidance of any sick or symptomatic family members and/or friends. I have also recommended avoidance of any raw and unwashed food products, and general avoidance of food items that have not been prepared by themselves. The patient has been asked to call us immediately with any symptom developments, issues, questions or other general concerns.         Pathology Discussion:     I reviewed and discussed the pathology report(s) and radiograph reports (if available) in as simple to understand and/or laymen's terms to the best of my ability. I had an indepth conversation with the patient and went over the  "patient's individual diagnosis based on the information that was currently available. I discussed the TNM staging process with regard to the patient's particular cancer type, and the calculated stage based on the currently available TNM data and literature. I discussed the available prognostic data with regard to the current staging information and how it relates to the prognosis of their particular neoplastic process.          NCCN Guidelines:     I discussed the available treatment option(s) in accordance with the latest literature from the NCCN Clinical Practice Guidelines for the patient's particular type of cancer disorder. The NCCN Guidelines provide a "document evidence-based (and) consensus-driven management" of the care of oncology patients. The treatment recommendations were made not only in accordance to the NCCN guidelines, but also factored in to account the patient's overall age, condition, performance status and their medical co-morbidities. I went over the risks and benefits of the the treatment options (if any could be made) with regard to their particular cancer type, their cancer stage, their age, and their co-morbidities.         Chemotherapy Discussion:      I discussed the available treatment option(s) in accordance with the latest/current national evidence-based guidelines (NCCN, UpToDate, NCI, ASCO, etc where applicable), their overall age/condition and their co-morbidities. I also went over the risks and benefits of the chemotherapy with regard to their particular cancer type, their cancer stage, their age/condition, and their co-morbidities. I provided literature on the chemotherapy regimen and discussed the chemotherapy side-effect profiles of the drug(s). I discussed the importance of compliance with obtaining and monitoring weekly lab work, and went over the potential hematopathology issues and risks with anemia, leucopenia and thrombocytopenia that can occur with chemotherapy. I " discussed the potential risks of liver and kidney damage, which could be permanent and could necessitate dialysis long-term if kidney failure developed. I discussed the emetic and/or diarrheal potential of the regimen and the potential need for use of antiemetic and anti-diarrheal medications. I discussed the risk for development of anaphylactic shock, bronchospasm, dysrhythmia, and respiratory/cardiovascular arrest and/or failure. I discussed the potential risks for development of alopecia, cold sensory issues, ringing in ears, vertigo, cataracts, glaucoma, and neuropathy, all of which could end up being chronic and life-long. Some chemotherpyI discussed the risks of hand-foot syndrome and rashes, and development of other autoimmune mediated processes such as pneumonitis, hepatitis, and colitis which could be life threatening. I discussed the risks of the potential development of a rare but fatal viral mediated disease known as PML (Progressive Multifocal Leukoencephalopathy), and risk of future development of leukemia and/or lymphoma from use of certain chemotherapy agents. I discussed the need for neutropenic precautions, basic hygiene/sanitation behaviors and dietary restrictions.    The patient's consent has been obtained to proceed with the chemotherapy.The patient will be referred to Chemotherapy School /[facility] for training and education on chemotherapy, use of antiemetics and/or anti-diarrheals, use of NSAID's, potential chemotherapy side-effects, and any specific recommendations and precautions with the particular chemotherapy agents.      I answered all of the patient's (and family's, if applicable) questions to the best of my ability and to their complete satisfaction. The patient acknowledged full understanding of the risks, recommendations and plan(s).     I have explained and the patient understands all of  the current recommendation(s). I have answered all of their questions to the best of  my ability and to their complete satisfaction.        I have explained all of the above in detail and the patient understands all of the current recommendation(s). I have answered all of their questions to the best of my ability and to their complete satisfaction.   The patient is to continue with the current management plan.            Electronically signed by Briana Carrillo NP                 Answers submitted by the patient for this visit:  Review of Systems Questionnaire (Submitted on 11/19/2024)  appetite change : No  unexpected weight change: No  mouth sores: No  visual disturbance: No  cough: Yes  shortness of breath: No  chest pain: Yes  abdominal pain: No  diarrhea: No  frequency: No  back pain: No  rash: No  headaches: No  adenopathy: No  nervous/ anxious: No

## 2024-11-26 ENCOUNTER — OFFICE VISIT (OUTPATIENT)
Dept: FAMILY MEDICINE | Facility: CLINIC | Age: 74
End: 2024-11-26
Payer: MEDICARE

## 2024-11-26 VITALS
DIASTOLIC BLOOD PRESSURE: 65 MMHG | HEIGHT: 66 IN | BODY MASS INDEX: 23.3 KG/M2 | WEIGHT: 145 LBS | HEART RATE: 73 BPM | SYSTOLIC BLOOD PRESSURE: 112 MMHG

## 2024-11-26 DIAGNOSIS — Z23 NEED FOR INFLUENZA VACCINATION: Primary | ICD-10-CM

## 2024-11-26 DIAGNOSIS — R68.89 COLD INTOLERANCE: ICD-10-CM

## 2024-11-26 DIAGNOSIS — E89.2 POSTPROCEDURAL HYPOPARATHYROIDISM: ICD-10-CM

## 2024-11-26 PROCEDURE — 1159F MED LIST DOCD IN RCRD: CPT | Mod: CPTII,S$GLB,, | Performed by: INTERNAL MEDICINE

## 2024-11-26 PROCEDURE — G0008 ADMIN INFLUENZA VIRUS VAC: HCPCS | Mod: S$GLB,,, | Performed by: INTERNAL MEDICINE

## 2024-11-26 PROCEDURE — 99999 PR PBB SHADOW E&M-EST. PATIENT-LVL III: CPT | Mod: PBBFAC,,, | Performed by: INTERNAL MEDICINE

## 2024-11-26 PROCEDURE — 3078F DIAST BP <80 MM HG: CPT | Mod: CPTII,S$GLB,, | Performed by: INTERNAL MEDICINE

## 2024-11-26 PROCEDURE — 1126F AMNT PAIN NOTED NONE PRSNT: CPT | Mod: CPTII,S$GLB,, | Performed by: INTERNAL MEDICINE

## 2024-11-26 PROCEDURE — 1157F ADVNC CARE PLAN IN RCRD: CPT | Mod: CPTII,S$GLB,, | Performed by: INTERNAL MEDICINE

## 2024-11-26 PROCEDURE — 3008F BODY MASS INDEX DOCD: CPT | Mod: CPTII,S$GLB,, | Performed by: INTERNAL MEDICINE

## 2024-11-26 PROCEDURE — 3288F FALL RISK ASSESSMENT DOCD: CPT | Mod: CPTII,S$GLB,, | Performed by: INTERNAL MEDICINE

## 2024-11-26 PROCEDURE — 99213 OFFICE O/P EST LOW 20 MIN: CPT | Mod: S$GLB,,, | Performed by: INTERNAL MEDICINE

## 2024-11-26 PROCEDURE — 1101F PT FALLS ASSESS-DOCD LE1/YR: CPT | Mod: CPTII,S$GLB,, | Performed by: INTERNAL MEDICINE

## 2024-11-26 PROCEDURE — 3072F LOW RISK FOR RETINOPATHY: CPT | Mod: CPTII,S$GLB,, | Performed by: INTERNAL MEDICINE

## 2024-11-26 PROCEDURE — 3074F SYST BP LT 130 MM HG: CPT | Mod: CPTII,S$GLB,, | Performed by: INTERNAL MEDICINE

## 2024-11-26 PROCEDURE — 90653 IIV ADJUVANT VACCINE IM: CPT | Mod: S$GLB,,, | Performed by: INTERNAL MEDICINE

## 2024-11-26 PROCEDURE — 1160F RVW MEDS BY RX/DR IN RCRD: CPT | Mod: CPTII,S$GLB,, | Performed by: INTERNAL MEDICINE

## 2024-11-26 PROCEDURE — 3044F HG A1C LEVEL LT 7.0%: CPT | Mod: CPTII,S$GLB,, | Performed by: INTERNAL MEDICINE

## 2024-11-26 RX ORDER — LEVOTHYROXINE SODIUM 125 UG/1
125 TABLET ORAL
Qty: 90 TABLET | Refills: 3 | Status: SHIPPED | OUTPATIENT
Start: 2024-11-26 | End: 2025-11-26

## 2024-11-26 NOTE — PROGRESS NOTES
Subjective:       Patient ID: Cyrus Batres Jr. is a 74 y.o. male.    Chief Complaint: Thyroid Problem, Breast tissue enlargement, and Status post tracheostomy tube    History of Present Illness    CHIEF COMPLAINT:  Cyrus presents for a follow-up visit to discuss breast pain and gynecomastia on the right side.    HPI:  Cyrus reports breast aching, specifically in the right breast. Dr. Tamez, possibly a specialist, believes it might be a reaction to the thyroid medication. A hematologist and oncologist have also considered that the breast pain might be related to the thyroid condition.    Cyrus has a history of laryngeal cancer and has undergone a total laryngectomy. He now has a tracheostomy device in his neck and communicates through a recording device.    Cyrus had COVID-19 3 weeks ago but has fully recovered without any medications.    Cyrus has non-tender gynecomastia on the right side. Initially, it was thought that famotidine might be causing this, but the patient clarified he has not taken this medication for more than 2 years.    Cyrus reports normal appetite and regular bowel movements. He denies any tenderness in the breast tissue, discharge, or use of acid reduction medication in the past 2 years. Cyrus denies any other significant health issues besides the breast pain and gynecomastia.    MEDICATIONS:  Cyrus is on Levothyroxine 125 mcg daily for thyroid condition and Esomeprazole (Nexium) 40 mg daily for reflux. He is also taking calcium supplements. For arthritis, he uses Voltaren (diclofenac) gel. Cyrus takes Tylenol (acetaminophen) as needed and Promethazine DM as needed for cough.    MEDICAL HISTORY:  Cyrus has a history of thyroid condition, GERD, laryngeal cancer, and COVID-19. Cyrus received a flu vaccine today.    SURGICAL HISTORY:  Cyrus underwent a total laryngectomy for laryngeal cancer, which resulted in the placement of a tracheostomy device in his neck.    TEST  RESULTS:  Thyroid function tests (T4 and TSH) were performed on July 29, 2024. The results were in good range.      ROS:  Gastrointestinal: -change in bowel habits  Musculoskeletal: -muscle pain  Skin: -rash  Breasts: +breast pain         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    Tongue cancer     Tracheostomy dependence     Type 2 diabetes mellitus, without long-term current use of insulin 11/22/2022    Unspecified glaucoma      Social History     Socioeconomic History    Marital status:      Spouse name: Janel Batres    Number of children: 2   Occupational History    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     Employer: [employer]   Tobacco Use    Smoking status: Never    Smokeless tobacco: Never   Substance and Sexual Activity    Alcohol use: Not Currently     Comment: occasional    Drug use: No    Sexual activity: Yes     Partners: Female   Social History Narrative    ** Merged History Encounter **         2 children from his 1st wife     Social Drivers of Health     Financial Resource Strain: Low Risk  (2/2/2024)    Overall Financial Resource Strain (CARDIA)     Difficulty of Paying Living Expenses: Not hard at all   Food Insecurity: No Food Insecurity (2/2/2024)    Hunger Vital Sign     Worried About Running Out of Food in the Last Year: Never true     Ran Out of Food in the Last Year: Never true   Transportation Needs: No Transportation Needs (2/2/2024)     PRAPARE - Transportation     Lack of Transportation (Medical): No     Lack of Transportation (Non-Medical): No   Physical Activity: Insufficiently Active (2/2/2024)    Exercise Vital Sign     Days of Exercise per Week: 3 days     Minutes of Exercise per Session: 30 min   Stress: No Stress Concern Present (2/2/2024)    [place] [employer] - Occupational Stress Questionnaire     Feeling of Stress : Not at all   Housing Stability: Low Risk  (2/2/2024)    Housing Stability Vital Sign     Unable to Pay for Housing in the Last Year: No     Number of Places Lived in the Last Year: 1     Unstable Housing in the Last Year: No     Past Surgical History:   Procedure Laterality Date    CATARACT EXTRACTION W/  INTRAOCULAR LENS IMPLANT Right 8/16/2023    Procedure: CEIOL OD  NPO-peg;  Surgeon: Stoney Munoz MD;  Location: [facility];  Service: Ophthalmology;  Laterality: Right;    CATARACT EXTRACTION W/  INTRAOCULAR LENS IMPLANT Left 10/18/2023    Procedure: CEIOL OS npo peg;  Surgeon: Stoney Munoz MD;  Location: [facility] OR;  Service: Ophthalmology;  Laterality: Left;    DIRECT LARYNGOBRONCHOSCOPY N/A 12/27/2021    Procedure: LARYNGOSCOPY, DIRECT, WITH BRONCHOSCOPY;  Surgeon: Flex Espinosa MD;  Location: [address];  Service: ENT;  Laterality: N/A;    DIRECT LARYNGOSCOPY  11/9/2022    Procedure: LARYNGOSCOPY, DIRECT;  Surgeon: Jesse James MD;  [facility];  Service: ENT;;    DISSECTION OF NECK Bilateral 1/6/2022    Procedure: DISSECTION, NECK;  Surgeon: Jesse James MD;  [facility];  Service: ENT;  Laterality: Bilateral;    DISSECTION OF NECK Bilateral 11/9/2022    Procedure: DISSECTION, NECK;  Surgeon: Jesse James MD;  [facility];  Service: ENT;  Laterality: Bilateral;    ESOPHAGOGASTRODUODENOSCOPY N/A 4/9/2024    Procedure: EGD (ESOPHAGOGASTRODUODENOSCOPY);  Surgeon: Matheus Cooper III, MD;  Location: [facility];   Service: Endoscopy;  Laterality: N/A;    FLAP PROCEDURE Right 1/6/2022    Procedure: CREATION, FREE FLAP;  Surgeon: Alise Hart MD;  Location: [facility] OR 2ND FLR;  Service: ENT;  Laterality: Right;  Ischemic start 1351  Ischemic stop 1502    FLAP PROCEDURE Left 11/9/2022    Procedure: CREATION, FREE FLAP, ALT;  Surgeon: Alise Hart MD;  Location: [facility] OR [facility] FLR;  Service: ENT;  Laterality: Left;    GLOSSECTOMY Bilateral 11/9/2022    Procedure: TOTAL GLOSSECTOMY;  Surgeon: Jesse James MD;  Location: [facility] OR 2ND FLR;  Service: ENT;  Laterality: Bilateral;    INSERTION OF TUNNELED CENTRAL VENOUS CATHETER (CVC) WITH SUBCUTANEOUS PORT N/A 6/9/2022    Procedure: [id number];  Surgeon: Jesus Viera MD;  Location: [facility];  Service: General;  Laterality: N/A;    INSERTION OF TUNNELED CENTRAL VENOUS CATHETER (CVC) WITH SUBCUTANEOUS PORT Right 1/12/2023    Procedure: [id number];  Surgeon: Abdulaziz Le Jr., MD;  Location: [facility];  Service: General;  Laterality: Right;    LARYNGECTOMY N/A 1/6/2022    Procedure: LARYNGECTOMY;  Surgeon: Jesse James MD;  [facility];  Service: ENT;  Laterality: N/A;    LARYNGOSCOPY N/A 8/4/2020    Procedure: Suspension microlaryngoscopy with biopsy, possible KTP laser treatment/excision;  Surgeon: Stew Noel MD;  [facility];  Service: ENT;  Laterality: N/A;  Microscope, telescopes, tower, microinstruments, KTP laser, rep conf# [id number] IC 7/28.    LARYNGOSCOPY N/A 3/16/2021    Procedure: Suspension microlaryngoscopy with excision of lesion, possible CO2 laser;  Surgeon: Stew Noel MD;  Location: [facility] OR 2ND FLR;  Service: ENT;  Laterality: N/A;  Microscope, telescopes, tower, microinstruments, CO2 laser, rep conf# [id number] IC 3/4.    LARYNGOSCOPY N/A 4/1/2021    Procedure: Suspension microlaryngoscopy with KTP laser excision of lesion;  Surgeon: Stew Noel MD;  Location: [facility] OR 2ND FLR;   Service: ENT;  Laterality: N/A;  Microscope, telescopes, tower, microinstruments, 70 degree scope, vocal fold , KTP laser, rep conf# [id number] BC    LARYNGOSCOPY N/A 12/9/2021    Procedure: Suspension microlaryngoscopy with biopsy;  Surgeon: Stew Noel MD;  [facility];  Service: ENT;  Laterality: N/A;  Microscope, telescopes, tower, microinstruments    LARYNGOSCOPY N/A 1/6/2022    Procedure: LARYNGOSCOPY;  Surgeon: Jesse James MD;  [facility];  Service: ENT;  Laterality: N/A;    LARYNGOSCOPY N/A 4/27/2022    Procedure: LARYNGOSCOPY WITH BIOPSY;  Surgeon: Jesse James MD;  [facility];  Service: ENT;  Laterality: N/A;    MICROLARYNGOSCOPY N/A 3/17/2020    Procedure: MICROLARYNGOSCOPY;  Surgeon: Jung Xiao MD;  Location: [facility];  Service: ENT;  Laterality: N/A;  Laser Microlaryngoscopy  NEED TO SCHEDULE LASER from [employer] [id number]    PHARYNGECTOMY  11/9/2022    Procedure: TOTAL PHARYNGECTOMY;  Surgeon: Jesse James MD;  [facility];  Service: ENT;;    REIMPLANTATION OF PARATHYROID TISSUE N/A 1/6/2022    Procedure: REIMPLANTATION, PARATHYROID TISSUE;  Surgeon: Jesse James MD;  [facility];  Service: ENT;  Laterality: N/A;    SKIN SPLIT GRAFT Right 11/9/2022    Procedure: APPLICATION, GRAFT, SKIN, SPLIT-THICKNESS;  Surgeon: Jesse James MD;  [facility];  Service: ENT;  Laterality: Right;    THYROIDECTOMY  1/6/2022    Procedure: THYROIDECTOMY;  Surgeon: Jesse James MD;  [facility];  Service: ENT;;    TRACHEOSTOMY N/A 12/27/2021    Procedure: CREATION, TRACHEOSTOMY;  Surgeon: Flex Espinosa MD;  [facility];  Service: ENT;  Laterality: N/A;     Family History   Problem Relation Name Age of Onset    Abnormal EKG Mother Haylye     Diabetes Father Nicolás     Heart disease Father Nicolás     Hypertension Father Nicolás   "      Objective:      Physical Exam  Blood pressure 112/65, pulse 73, height 5' 6" (1.676 m), weight 65.8 kg (145 lb). Body mass index is 23.4 kg/m².  Physical Exam    Vitals: Blood pressure: 112/65.  General: No acute distress.  Somewhat thin built but well developed.  Eyes: EOMI. Sclerae anicteric.  HENT: Normocephalic. Atraumatic. Nares patent. Moist oral mucosa.  Tracheostomy device in place.  Patient is aphonic.  Chest wall:-Port-A-Cath  Cardiovascular: Regular rate. Regular rhythm. No murmurs. No rubs. No gallops. Normal S1, S2.  Respiratory: Normal respiratory effort. Clear to auscultation bilaterally. No rales. No rhonchi. No wheezing.  Breast: Right breast tissue somewhat thick and firm. No breast discharge.  Abdomen: Soft. Non-tender. Non-distended. Normoactive bowel sounds.  Peg tube  Musculoskeletal: No  obvious deformity.  Extremities: No lower extremity edema.  Neurological:  And aphonic.  Normal gait.  Communicates through iPhone typing and voice reproduction  Psychiatric:  Perhaps slightly anhedonic  Skin: Warm. Dry. No rash.              Assessment:       Lab Visit on 11/18/2024   Component Date Value Ref Range Status    WBC 11/18/2024 5.46  3.90 - 12.70 K/uL Final    RBC 11/18/2024 4.12 (L)  4.60 - 6.20 M/uL Final    Hemoglobin 11/18/2024 12.9 (L)  14.0 - 18.0 g/dL Final    Hematocrit 11/18/2024 38.3 (L)  40.0 - 54.0 % Final    MCV 11/18/2024 93  82 - 98 fL Final    MCH 11/18/2024 31.3 (H)  27.0 - 31.0 pg Final    MCHC 11/18/2024 33.7  32.0 - 36.0 g/dL Final    RDW 11/18/2024 12.1  11.5 - 14.5 % Final    Platelets 11/18/2024 156  150 - 450 K/uL Final    MPV 11/18/2024 12.2  9.2 - 12.9 fL Final    Immature Granulocytes 11/18/2024 2.0 (H)  0.0 - 0.5 % Final    Gran # (ANC) 11/18/2024 4.1  1.8 - 7.7 K/uL Final    Immature Grans (Abs) 11/18/2024 0.11 (H)  0.00 - 0.04 K/uL Final    Lymph # 11/18/2024 0.6 (L)  1.0 - 4.8 K/uL Final    Mono # 11/18/2024 0.4  0.3 - 1.0 K/uL Final    Eos # 11/18/2024 0.2  " 0.0 - 0.5 K/uL Final    Baso # 11/18/2024 0.02  0.00 - 0.20 K/uL Final    nRBC 11/18/2024 0  0 /100 WBC Final    Gran % 11/18/2024 75.6 (H)  38.0 - 73.0 % Final    Lymph % 11/18/2024 10.8 (L)  18.0 - 48.0 % Final    Mono % 11/18/2024 7.7  4.0 - 15.0 % Final    Eosinophil % 11/18/2024 3.5  0.0 - 8.0 % Final    Basophil % 11/18/2024 0.4  0.0 - 1.9 % Final    Differential Method 11/18/2024 Automated   Final    Sodium 11/18/2024 137  136 - 145 mmol/L Final    Potassium 11/18/2024 4.2  3.5 - 5.1 mmol/L Final    Chloride 11/18/2024 99  95 - 110 mmol/L Final    CO2 11/18/2024 29  23 - 29 mmol/L Final    Glucose 11/18/2024 113 (H)  70 - 110 mg/dL Final    BUN 11/18/2024 30 (H)  8 - 23 mg/dL Final    Creatinine 11/18/2024 1.0  0.5 - 1.4 mg/dL Final    Calcium 11/18/2024 8.7  8.7 - 10.5 mg/dL Final    Total Protein 11/18/2024 7.1  6.0 - 8.4 g/dL Final    Albumin 11/18/2024 4.2  3.5 - 5.2 g/dL Final    Total Bilirubin 11/18/2024 0.9  0.1 - 1.0 mg/dL Final    Alkaline Phosphatase 11/18/2024 290 (H)  55 - 135 U/L Final    AST 11/18/2024 35  10 - 40 U/L Final    ALT 11/18/2024 44  10 - 44 U/L Final    eGFR 11/18/2024 >60.0  >60 mL/min/1.73 m^2 Final    Anion Gap 11/18/2024 9  8 - 16 mmol/L Final    Iron 11/18/2024 55  45 - 160 ug/dL Final    Transferrin 11/18/2024 197 (L)  200 - 375 mg/dL Final    TIBC 11/18/2024 276  250 - 450 ug/dL Final    Saturated Iron 11/18/2024 20  20 - 50 % Final    Ferritin 11/18/2024 506.6 (H)  20.0 - 300.0 ng/mL Final   Office Visit on 10/14/2024   Component Date Value Ref Range Status    QRS Duration 10/14/2024 84  ms Final    [facility] QTC Calculation 10/14/2024 412  ms Final   Lab Visit on 09/16/2024   Component Date Value Ref Range Status    WBC 09/16/2024 5.58  3.90 - 12.70 K/uL Final    RBC 09/16/2024 4.12 (L)  4.60 - 6.20 M/uL Final    Hemoglobin 09/16/2024 13.3 (L)  14.0 - 18.0 g/dL Final    Hematocrit 09/16/2024 38.2 (L)  40.0 - 54.0 % Final    MCV 09/16/2024 93  82 - 98 fL Final    MCH 09/16/2024  32.3 (H)  27.0 - 31.0 pg Final    MCHC 09/16/2024 34.8  32.0 - 36.0 g/dL Final    RDW 09/16/2024 13.1  11.5 - 14.5 % Final    Platelets 09/16/2024 122 (L)  150 - 450 K/uL Final    MPV 09/16/2024 12.5  9.2 - 12.9 fL Final    Immature Granulocytes 09/16/2024 0.5  0.0 - 0.5 % Final    Gran # (ANC) 09/16/2024 3.9  1.8 - 7.7 K/uL Final    Immature Grans (Abs) 09/16/2024 0.03  0.00 - 0.04 K/uL Final    Lymph # 09/16/2024 0.9 (L)  1.0 - 4.8 K/uL Final    Mono # 09/16/2024 0.4  0.3 - 1.0 K/uL Final    Eos # 09/16/2024 0.3  0.0 - 0.5 K/uL Final    Baso # 09/16/2024 0.02  0.00 - 0.20 K/uL Final    nRBC 09/16/2024 0  0 /100 WBC Final    Gran % 09/16/2024 69.1  38.0 - 73.0 % Final    Lymph % 09/16/2024 16.7 (L)  18.0 - 48.0 % Final    Mono % 09/16/2024 7.7  4.0 - 15.0 % Final    Eosinophil % 09/16/2024 5.6  0.0 - 8.0 % Final    Basophil % 09/16/2024 0.4  0.0 - 1.9 % Final    Differential Method 09/16/2024 Automated   Final    Sodium 09/16/2024 134 (L)  136 - 145 mmol/L Final    Potassium 09/16/2024 3.9  3.5 - 5.1 mmol/L Final    Chloride 09/16/2024 96  95 - 110 mmol/L Final    CO2 09/16/2024 28  23 - 29 mmol/L Final    Glucose 09/16/2024 98  70 - 110 mg/dL Final    BUN 09/16/2024 31 (H)  8 - 23 mg/dL Final    Creatinine 09/16/2024 1.2  0.5 - 1.4 mg/dL Final    Calcium 09/16/2024 9.0  8.7 - 10.5 mg/dL Final    Total Protein 09/16/2024 7.2  6.0 - 8.4 g/dL Final    Albumin 09/16/2024 4.4  3.5 - 5.2 g/dL Final    Total Bilirubin 09/16/2024 1.3 (H)  0.1 - 1.0 mg/dL Final    Alkaline Phosphatase 09/16/2024 150 (H)  55 - 135 U/L Final    AST 09/16/2024 35  10 - 40 U/L Final    ALT 09/16/2024 30  10 - 44 U/L Final    eGFR 09/16/2024 >60.0  >60 mL/min/1.73 m^2 Final    Anion Gap 09/16/2024 10  8 - 16 mmol/L Final    Iron 09/16/2024 80  45 - 160 ug/dL Final    Transferrin 09/16/2024 257  200 - 375 mg/dL Final    TIBC 09/16/2024 360  250 - 450 ug/dL Final    Saturated Iron 09/16/2024 22  20 - 50 % Final    Ferritin 09/16/2024 141.2  20.0  - 300.0 ng/mL Final       Assessment & Plan    Evaluated gynecomastia: Initially considered famotidine as potential cause, but patient clarified non-use for 2 years  Assessed thyroid medication as unlikely cause of gynecomastia  Noted history of laryngeal cancer, status post total laryngectomy with tracheostomy device  Assessed recent COVID infection, patient recovered without medications    E03.9 HYPOTHYROIDISM, UNSPECIFIED:  - Continued levothyroxine 125 mcg daily for thyroid management.  - Refilled levothyroxine (patient reported being almost out).  - Ordered thyroid function tests (T4 and TSH) for January.  - Complete thyroid labs (T4 and TSH) at [facility] in January, approximately 6 months from last tests.    Z23 ENCOUNTER FOR IMMUNIZATION:  - Administered flu vaccine in office.    U07.1 COVID-19:  - Explained that Paxlovid is prescription-only and not available over the counter.  - Discussed potential insurance coverage and co-payment requirements for Paxlovid.    LIFESTYLE CHANGES:  - Follow up in 4-6 months.         Plan:   Need for influenza vaccination  -     influenza (adjuvanted) (Fluad) 45 mcg/0.5 mL IM vaccine (> or = 64 yo) 0.5 mL    Cold intolerance  -     levothyroxine (SYNTHROID) 125 MCG tablet; 1 tablet (125 mcg total) by Per G Tube route before breakfast.  Dispense: 90 tablet; Refill: 3    Postprocedural hypoparathyroidism  -     TSH; Future; Expected date: 11/27/2024  -     T4, FREE; Future; Expected date: 11/26/2024  -     levothyroxine (SYNTHROID) 125 MCG tablet; 1 tablet (125 mcg total) by Per G Tube route before breakfast.  Dispense: 90 tablet; Refill: 3    Patient's gynecomastia has been noted.  Initially I thought it might be famotidine which is listed as an active medication in our system but subsequently after lot of thought and process, patient clarified that he is not taking the medication anymore for 2 years and hence this could not be the cause.  I do not see any other  medication causing gynecomastia and further workup as maybe appropriate for him.  Thyroid medications are unlikely to cause but will check his thyroid status at next visit.  He got the flu shot today.  Head and neck cancer has been noted and there is extensively detailed information which is beyond the purview of today's office visit with me as a primary care physician.  Follow-up in 4-6 months time.    Follow up in about 6 months (around 5/26/2025), or if symptoms worsen or fail to improve, for Thyroid.      Current Outpatient Medications:     acetaminophen (TYLENOL) 325 MG tablet, Take 325 mg by mouth every 6 (six) hours as needed for Pain., Disp: , Rfl:     calcium carbonate 500 mg/5 mL (1,250 mg/5 mL), Take 20 mls by mouth four times daily with meals and nightly., Disp: 2300 mL, Rfl: 12    diclofenac sodium (VOLTAREN ARTHRITIS PAIN) 1 % Gel, Apply 2 g topically once daily. Apply couple of times a day on the affected arthritis pain., Disp: 100 g, Rfl: 1    esomeprazole (NEXIUM) 40 MG capsule, 40 mg before breakfast., Disp: , Rfl:     promethazine-dextromethorphan (PROMETHAZINE-DM) 6.25-15 mg/5 mL Syrp, Take 5 mLs by mouth every 12 (twelve) hours as needed (cough and cogestion)., Disp: 300 mL, Rfl: 5    levothyroxine (SYNTHROID) 125 MCG tablet, 1 tablet (125 mcg total) by Per G Tube route before breakfast., Disp: 90 tablet, Rfl: 3  No current facility-administered medications for this visit.    This note was generated with the assistance of ambient listening technology. Verbal consent was obtained by the patient and accompanying visitor(s) for the recording of patient appointment to facilitate this note. I attest to having reviewed and edited the generated note for accuracy, though some syntax or spelling errors may persist. Please contact the author of this note for any clarification.      Maico Patterson      Please void the below description since patient  subsequently informed me that he is not taking Pepcid  anymore for the last 2 years.  In this scenario, the potential cause of gynecomastia could be related to the medication famotidine. Famotidine is an H2 receptor antagonist used to reduce stomach acid, and although it is less commonly associated with gynecomastia compared to cimetidine (another H2 blocker), there have been reports of gynecomastia occurring with its use. This is due to its potential to interfere with hormone levels or receptors, albeit infrequently.  Other medications listed, such as Tylenol, calcium supplements, Voltaren gel (topical diclofenac), Nexium (esomeprazole), levothyroxine, and promethazine DM, are not typically associated with gynecomastia. In particular, esomeprazole and levothyroxine do not have a known link to this condition.   Considering his medication profile and the absence of spironolactone or other more commonly implicated drugs, famotidine stands out as the most likely suspect. If the gynecomastia is a concern, a discussion with his healthcare provider about the necessity of continuing famotidine or exploring alternative treatments might be warranted.    Patients who have undergone a total laryngectomy can use a device called a voice prosthesis to enable speech. This device is part of a tracheoesophageal puncture (TEP) speech restoration method. Additionally, an electrolarynx is another device that can be used, which is a handheld, battery-operated device that produces vibrations to help create sound. Both methods allow individuals to produce speech by creating sound that can be articulated with the mouth, lips, and tongue.

## 2024-12-01 ENCOUNTER — PATIENT MESSAGE (OUTPATIENT)
Facility: CLINIC | Age: 74
End: 2024-12-01
Payer: MEDICARE

## 2024-12-02 ENCOUNTER — OFFICE VISIT (OUTPATIENT)
Dept: SURGICAL ONCOLOGY | Facility: CLINIC | Age: 74
End: 2024-12-02
Payer: MEDICARE

## 2024-12-02 ENCOUNTER — PATIENT MESSAGE (OUTPATIENT)
Dept: OTOLARYNGOLOGY | Facility: CLINIC | Age: 74
End: 2024-12-02
Payer: MEDICARE

## 2024-12-02 DIAGNOSIS — C01 MALIGNANT NEOPLASM OF BASE OF TONGUE: Primary | ICD-10-CM

## 2024-12-02 PROCEDURE — 1101F PT FALLS ASSESS-DOCD LE1/YR: CPT | Mod: CPTII,S$GLB,, | Performed by: OTOLARYNGOLOGY

## 2024-12-02 PROCEDURE — 3072F LOW RISK FOR RETINOPATHY: CPT | Mod: CPTII,S$GLB,, | Performed by: OTOLARYNGOLOGY

## 2024-12-02 PROCEDURE — 1125F AMNT PAIN NOTED PAIN PRSNT: CPT | Mod: CPTII,S$GLB,, | Performed by: OTOLARYNGOLOGY

## 2024-12-02 PROCEDURE — 99213 OFFICE O/P EST LOW 20 MIN: CPT | Mod: 25,S$GLB,, | Performed by: OTOLARYNGOLOGY

## 2024-12-02 PROCEDURE — 3044F HG A1C LEVEL LT 7.0%: CPT | Mod: CPTII,S$GLB,, | Performed by: OTOLARYNGOLOGY

## 2024-12-02 PROCEDURE — 31575 DIAGNOSTIC LARYNGOSCOPY: CPT | Mod: S$GLB,,, | Performed by: OTOLARYNGOLOGY

## 2024-12-02 PROCEDURE — 1159F MED LIST DOCD IN RCRD: CPT | Mod: CPTII,S$GLB,, | Performed by: OTOLARYNGOLOGY

## 2024-12-02 PROCEDURE — 1157F ADVNC CARE PLAN IN RCRD: CPT | Mod: CPTII,S$GLB,, | Performed by: OTOLARYNGOLOGY

## 2024-12-02 PROCEDURE — 1160F RVW MEDS BY RX/DR IN RCRD: CPT | Mod: CPTII,S$GLB,, | Performed by: OTOLARYNGOLOGY

## 2024-12-02 PROCEDURE — 3288F FALL RISK ASSESSMENT DOCD: CPT | Mod: CPTII,S$GLB,, | Performed by: OTOLARYNGOLOGY

## 2024-12-02 PROCEDURE — 99999 PR PBB SHADOW E&M-EST. PATIENT-LVL III: CPT | Mod: PBBFAC,,, | Performed by: OTOLARYNGOLOGY

## 2024-12-02 NOTE — PROGRESS NOTES
CC: Surveillance    Oncology History   Larynx cancer   8/4/2020 Initial Diagnosis    Larynx neoplasm malignant     8/6/2020 Tumor Conference    His case was discussed at the Multidisciplinary Head and Neck Team Planning Meeting.    Representatives from Medical Oncology, Radiation Oncology, Head and Neck Surgical Oncology, Psychosocial Oncology, and Speech and Language Pathology discussed the case with the following recommendations:    1) definitive radiation              8/6/2020 Cancer Staged    Staging form: Larynx - Glottis, AJCC 8th Edition  - Clinical stage from 8/6/2020: Stage II (cT2, cN0, cM0)     8/6/2020 Cancer Staged    Cancer Staging  Larynx neoplasm malignant  Staging form: Larynx - Glottis, AJCC 8th Edition  - Clinical stage from 8/6/2020: Stage II (cT2, cN0, cM0) - Signed by Fariha Muñoz NP on 8/6/2020 12/16/2021 Tumor Conference    His case was discussed at the Multidisciplinary Head and Neck Team Planning Meeting.    Representatives from Medical Oncology, Radiation Oncology, Head and Neck Surgical Oncology, Psychosocial Oncology, and Speech and Language Pathology discussed the case with the following recommendations:    1) TL  2) oncology referral  3) psychology referral            12/27/2021 Surgery    1. DL And tracheostomy  2. PEG     1/6/2022 Surgery    1. Diagnostic direct laryngoscopy  2. Bilateral modified neck dissection of levels 2 through 4  3. Total laryngectomy  4. Total thyroidectomy with bilateral paratracheal/upper mediastinal neck dissection  5. Autotransplantation of left inferior parathyroid into left sternocleidomastoid muscle   6. Left anterolateral thigh free flap for closure of total laryngectomy neck skin defect  7. Advancement flap for closure of left thigh donor site advanced area was 25 x 10 cm     1/20/2022 Cancer Staged    Staging form: Larynx - Glottis, AJCC 8th Edition  - Pathologic stage from 1/20/2022: Stage LOU (pT4a, pN0, cM0)     5/30/2022 Cancer Staged     Staging form: Pharynx - P16 Negative Oropharynx, AJCC 8th Edition  - Clinical: Stage II (rcT2, cN0, cM0)     1/23/2023 -  Chemotherapy    Treatment Summary   Plan Name: OP HEAD NECK CISPLATIN WEEKLY + RADIOTHERAPY  Treatment Goal: Curative  Status: Active  Start Date: 1/23/2023  End Date: 3/6/2023  Provider: Venu Tamez MD  Chemotherapy: CISplatin (Platinol) 40 mg/m2 = 66 mg in sodium chloride 0.9% 631 mL chemo infusion, 40 mg/m2 = 66 mg, Intravenous, Clinic/HOD 1 time, 7 of 7 cycles  Administration: 66 mg (1/23/2023), 66 mg (2/13/2023), 66 mg (2/6/2023), 66 mg (2/20/2023), 66 mg (2/27/2023), 66 mg (3/6/2023)     Malignant neoplasm of base of tongue   5/20/2022 Cancer Staged    Staging form: Pharynx - P16 Negative Oropharynx, AJCC 8th Edition  - Clinical stage from 5/20/2022: Stage II (cT2, cN0, cM0, p16-)     6/22/2022 Initial Diagnosis    Primary cancer of base of tongue     7/11/2022 - 8/26/2022 Chemotherapy    Treatment Summary   Plan Name: OP HEAD NECK PEMBROLIZUMAB CARBOPLATIN FLUOROURACIL Q3W FOLLOWED BY MAINTENANCE PEMBROLIZUMAB 400 MG Q6W  Treatment Goal: Palliative  Status: Inactive  Start Date: 7/11/2022  End Date: 8/26/2022  Provider: Aurash Khoobehi, MD  Chemotherapy: CARBOplatin (PARAPLATIN) 440 mg in sodium chloride 0.9% 544 mL chemo infusion, 440 mg (100 % of original dose 438.5 mg), Intravenous, Clinic/HOD 1 time, 3 of 6 cycles  Dose modification:   (original dose 438.5 mg, Cycle 1)  Administration: 440 mg (7/11/2022), 435 mg (8/1/2022), 510 mg (8/22/2022)     1/23/2023 -  Chemotherapy    Treatment Summary   Plan Name: OP HEAD NECK CISPLATIN WEEKLY + RADIOTHERAPY  Treatment Goal: Curative  Status: Active  Start Date: 1/23/2023  End Date: 3/6/2023  Provider: Venu Tamez MD  Chemotherapy: CISplatin (Platinol) 40 mg/m2 = 66 mg in sodium chloride 0.9% 631 mL chemo infusion, 40 mg/m2 = 66 mg, Intravenous, [facility] 1 time, 7 of 7 cycles  Administration: 66 mg (1/23/2023), 66 mg  (2/13/2023), 66 mg (2/6/2023), 66 mg (2/20/2023), 66 mg (2/27/2023), 66 mg (3/6/2023)           HPI   74 y.o. male presents with the above treatment history.  No  new complaints.  Minimal benefit from last Botox injection.    Review of Systems   Constitutional: Negative for fatigue and unexpected weight change.   HENT: Per HPI.  Eyes: Negative for visual disturbance.   Respiratory: Negative for shortness of breath, hemoptysis   Cardiovascular: Negative for chest pain and palpitations.   Musculoskeletal: Negative for decreased ROM, back pain.   Skin: Negative for rash, sunburn, itching.   Neurological: Negative for dizziness and seizures.   Hematological: Negative for adenopathy. Does not bruise/bleed easily.   Endocrine: Negative for rapid weight loss/weight gain, heat/cold intolerance.     Past Medical History   Patient Active Problem List   Diagnosis    Melanocytic nevus    Body mass index (BMI) of 29.0-29.9 in adult    Benign hypertension    Overweight    Type 2 diabetes mellitus with diabetic arthropathy, with long-term current use of insulin    Fatty liver disease, nonalcoholic    False positive serological test for hepatitis C    Hyperlipidemia    Type 2 diabetes mellitus with hyperglycemia, without long-term current use of insulin    Gastroesophageal reflux disease without esophagitis    Type 2 diabetes mellitus with hyperglycemia    Vitamin B12 deficiency    Hyperuricemia    Hypovitaminosis D    Proteinuria    On enteral nutrition    Larynx cancer    Dehydration    Adverse effect of adrenal cortical steroids, sequela    S/P laryngectomy    Hypocalcemia    Aphonia    Dysphagia, pharyngoesophageal    Acute pain of both shoulders    Bilateral arm weakness    Oral bleeding    Malignant neoplasm of base of tongue    Advanced care planning/counseling discussion    Iron deficiency anemia due to chronic blood loss    Postoperative hypothyroidism    Pressure injury of sacral region, stage 1    Pneumatosis  intestinalis    Nausea and vomiting    Neutropenia    Abnormal CT scan, neck    Open wound of neck    Open wnd of pharynx    Open wound of left thigh    Open wound of mouth    Tracheostomy in place    Type 2 diabetes mellitus, without long-term current use of insulin    Laryngeal cancer    Hypocalcemia    Hyperbilirubinemia    Elevated transaminase level    Hyponatremia    PEG (percutaneous endoscopic gastrostomy) adjustment/replacement/removal    Hypothyroidism    Dehydration    Pharyngocutaneous fistula    Elevated alkaline phosphatase level    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    Scar conditions and fibrosis of skin    Decreased ROM of neck    Iron deficiency anemia    Phantom pain    Chronic low back pain    Palliative care by specialist    Cancer related pain    S/P percutaneous endoscopic gastrostomy (PEG) tube placement    Secondary malignant neoplasm of other specified sites    Postprocedural hypoparathyroidism    Aortic atherosclerosis    Breast pain, right    Lesion of right nipple           Past Surgical History   Past Surgical History:   Procedure Laterality Date    CATARACT EXTRACTION W/  INTRAOCULAR LENS IMPLANT Right 8/16/2023    Procedure: CEIOL OD  NPO-peg;  Surgeon: Stoney Munoz MD;  Location: [facility] OR;  Service: Ophthalmology;  Laterality: Right;    CATARACT EXTRACTION W/  INTRAOCULAR LENS IMPLANT Left 10/18/2023    Procedure: CEIOL OS npo peg;  Surgeon: Stoney Munoz MD;  Location: [facility] OR;  Service: Ophthalmology;  Laterality: Left;    DIRECT LARYNGOBRONCHOSCOPY N/A 12/27/2021    Procedure: LARYNGOSCOPY, DIRECT, WITH BRONCHOSCOPY;  Surgeon: Flex Espinosa MD;  Location: [address];  Service: ENT;  Laterality: N/A;    DIRECT LARYNGOSCOPY  11/9/2022    Procedure: LARYNGOSCOPY, DIRECT;  Surgeon: Jesse James MD;  Location: [facility] OR [address] [place] [address];  Service: ENT;;    DISSECTION OF NECK Bilateral 1/6/2022    Procedure: DISSECTION, NECK;  Surgeon: Jesse James MD;  Location: [facility]  OR 2ND FLR;  Service: ENT;  Laterality: Bilateral;    DISSECTION OF NECK Bilateral 11/9/2022    Procedure: DISSECTION, NECK;  Surgeon: Jesse James MD;  Location: [facility] OR 2ND FLR;  Service: ENT;  Laterality: Bilateral;    ESOPHAGOGASTRODUODENOSCOPY N/A 4/9/2024    Procedure: EGD (ESOPHAGOGASTRODUODENOSCOPY);  Surgeon: Matheus Cooper III, MD;  Location: [facility] ENDO;  Service: Endoscopy;  Laterality: N/A;    FLAP PROCEDURE Right 1/6/2022    Procedure: CREATION, FREE FLAP;  Surgeon: Alise Hart MD;  Location: [facility] OR [facility] FLR;  Service: ENT;  Laterality: Right;  Ischemic start 1351  Ischemic stop 1502    FLAP PROCEDURE Left 11/9/2022    Procedure: CREATION, FREE FLAP, ALT;  Surgeon: Alise Hart MD;  Location: [facility] OR [facility] FLR;  Service: ENT;  Laterality: Left;    GLOSSECTOMY Bilateral 11/9/2022    Procedure: TOTAL GLOSSECTOMY;  Surgeon: Jesse James MD;  Location: [facility] OR [facility] FLR;  Service: ENT;  Laterality: Bilateral;    INSERTION OF TUNNELED CENTRAL VENOUS CATHETER (CVC) WITH SUBCUTANEOUS PORT N/A 6/9/2022    Procedure: [id number]-CATH;  Surgeon: Jesus Viera MD;  Location: [facility] OR;  Service: General;  Laterality: N/A;    INSERTION OF TUNNELED CENTRAL VENOUS CATHETER (CVC) WITH SUBCUTANEOUS PORT Right 1/12/2023    Procedure: [id number]-A-CATH;  Surgeon: Abdulaziz Le Jr., MD;  Location: [facility] OR;  Service: General;  Laterality: Right;    LARYNGECTOMY N/A 1/6/2022    Procedure: LARYNGECTOMY;  Surgeon: Jesse James MD;  Location: [facility] OR [facility] FLR;  Service: ENT;  Laterality: N/A;    LARYNGOSCOPY N/A 8/4/2020    Procedure: Suspension microlaryngoscopy with biopsy, possible KTP laser treatment/excision;  Surgeon: Stew Noel MD;  Location: [facility] OR 2ND FLR;  Service: ENT;  Laterality: N/A;  Microscope, telescopes, tower, microinstruments, KTP laser, rep conf# [id number] IC 7/28.    LARYNGOSCOPY N/A 3/16/2021    Procedure: Suspension microlaryngoscopy with excision  of lesion, possible CO2 laser;  Surgeon: Stew Noel MD;  [facility];  Service: ENT;  Laterality: N/A;  Microscope, telescopes, tower, microinstruments, CO2 laser, rep conf# [id number] IC 3/4.    LARYNGOSCOPY N/A 4/1/2021    Procedure: Suspension microlaryngoscopy with KTP laser excision of lesion;  Surgeon: Stew Noel MD;  [facility];  Service: ENT;  Laterality: N/A;  Microscope, telescopes, tower, microinstruments, 70 degree scope, vocal fold , KTP laser, rep conf# [id number] BC    LARYNGOSCOPY N/A 12/9/2021    Procedure: Suspension microlaryngoscopy with biopsy;  Surgeon: Stew Noel MD;  [facility];  Service: ENT;  Laterality: N/A;  Microscope, telescopes, tower, microinstruments    LARYNGOSCOPY N/A 1/6/2022    Procedure: LARYNGOSCOPY;  Surgeon: Jesse James MD;  [facility];  Service: ENT;  Laterality: N/A;    LARYNGOSCOPY N/A 4/27/2022    Procedure: LARYNGOSCOPY WITH BIOPSY;  Surgeon: Jesse James MD;  [facility];  Service: ENT;  Laterality: N/A;    MICROLARYNGOSCOPY N/A 3/17/2020    Procedure: MICROLARYNGOSCOPY;  Surgeon: Jung Xiao MD;  Location: [facility] OR;  Service: ENT;  Laterality: N/A;  Laser Microlaryngoscopy  NEED TO SCHEDULE LASER from [facility] [id number]    PHARYNGECTOMY  11/9/2022    Procedure: TOTAL PHARYNGECTOMY;  Surgeon: Jesse James MD;  [facility];  Service: ENT;;    REIMPLANTATION OF PARATHYROID TISSUE N/A 1/6/2022    Procedure: REIMPLANTATION, PARATHYROID TISSUE;  Surgeon: Jesse James MD;  [facility];  Service: ENT;  Laterality: N/A;    SKIN SPLIT GRAFT Right 11/9/2022    Procedure: APPLICATION, GRAFT, SKIN, SPLIT-THICKNESS;  Surgeon: Jesse James MD;  Location: [facility] OR [address] [place] [address];  Service: ENT;  Laterality: Right;    THYROIDECTOMY  1/6/2022    Procedure: THYROIDECTOMY;  Surgeon: Jesse James MD;   [facility];  Service: ENT;;    TRACHEOSTOMY N/A 12/27/2021    Procedure: CREATION, TRACHEOSTOMY;  Surgeon: Flex Espinosa MD;  [facility];  Service: ENT;  Laterality: N/A;         Family History   Family History   Problem Relation Name Age of Onset    Abnormal EKG Mother Hayley     Diabetes Father Nicolás     Heart disease Father Nicolás     Hypertension Father Nicolás            Social History   .  Social History     Socioeconomic History    Marital status:      Spouse name: Janel Batres    Number of children: 2   Occupational History    Occupation: [employer]     Employer: [employer]   Tobacco Use    Smoking status: Never    Smokeless tobacco: Never   Substance and Sexual Activity    Alcohol use: Not Currently     Comment: occasional    Drug use: No    Sexual activity: Yes     Partners: Female   Social History Narrative    ** Merged History Encounter **         2 children from his 1st wife     Social Drivers of Health     Financial Resource Strain: Low Risk  (2/2/2024)    Overall Financial Resource Strain (CARDIA)     Difficulty of Paying Living Expenses: Not hard at all   Food Insecurity: No Food Insecurity (2/2/2024)    Hunger Vital Sign     Worried About Running Out of Food in the Last Year: Never true     Ran Out of Food in the Last Year: Never true   Transportation Needs: No Transportation Needs (2/2/2024)    PRAPARE - Transportation     Lack of Transportation (Medical): No     Lack of Transportation (Non-Medical): No   Physical Activity: Insufficiently Active (2/2/2024)    Exercise Vital Sign     Days of Exercise per Week: 3 days     Minutes of Exercise per Session: 30 min   Stress: No Stress Concern Present (2/2/2024)    [employer] - Occupational Stress Questionnaire     Feeling of Stress : Not at all   Housing Stability: Low Risk  (2/2/2024)    Housing Stability Vital Sign     Unable to Pay for Housing in the Last Year: No     Number of  Places Lived in the Last Year: 1     Unstable Housing in the Last Year: No         Allergies   Review of patient's allergies indicates:   Allergen Reactions    Lovastatin Itching    Pollen extracts     Lovastatin Rash     Not confirmed but pt skeptical           Physical Exam     There were no vitals filed for this visit.          There is no height or weight on file to calculate BMI.      General: AOx3, NAD   Respiratory:  Symmetric chest rise, normal effort  Oral Cavity:  Flap healthy. No worrisome lesions. Moderate trismus.  Oropharynx:  No masses/lesions of the posterior pharyngeal wall. Tonsillar fossa without lesions. Soft palate without masses. Midline uvula.   Neck: Stoma widely patent. Skin paddle healthy with good color and turgor.Well-healed neck incisions.No LAD. Moderate post-op, post-treatment fibrosis. Submental neck diffusely full.  Face: House Brackmann I bilaterally.    Scope via OC: No lesions of neopharynx.      Assessment/Plan  Problem List Items Addressed This Visit          Oncology    Malignant neoplasm of base of tongue - Primary     ZAY.  RTC 3 mos, sooner for Botox.

## 2024-12-05 DIAGNOSIS — E89.2 POSTPROCEDURAL HYPOPARATHYROIDISM: ICD-10-CM

## 2024-12-05 DIAGNOSIS — R68.89 COLD INTOLERANCE: ICD-10-CM

## 2024-12-06 RX ORDER — LEVOTHYROXINE SODIUM 125 UG/1
125 TABLET ORAL
Qty: 90 TABLET | Refills: 3 | Status: SHIPPED | OUTPATIENT
Start: 2024-12-06 | End: 2025-12-06

## 2024-12-06 NOTE — TELEPHONE ENCOUNTER
Rx was sent 11/26/24 but was sent on print. Patient needs the prescription sent to pharmacy    LOV 11-26-24  NOV 5-12-25

## 2024-12-16 ENCOUNTER — TELEPHONE (OUTPATIENT)
Facility: CLINIC | Age: 74
End: 2024-12-16
Payer: MEDICARE

## 2024-12-16 NOTE — TELEPHONE ENCOUNTER
I spoke with pt wife and reminded her that pt needs labs done prior to appt on 12/23/24 she verbalized understanding.

## 2024-12-17 ENCOUNTER — LAB VISIT (OUTPATIENT)
Dept: LAB | Facility: HOSPITAL | Age: 74
End: 2024-12-17
Attending: INTERNAL MEDICINE
Payer: MEDICARE

## 2024-12-17 DIAGNOSIS — D50.0 IRON DEFICIENCY ANEMIA DUE TO CHRONIC BLOOD LOSS: ICD-10-CM

## 2024-12-17 DIAGNOSIS — C32.9 LARYNGEAL CANCER: ICD-10-CM

## 2024-12-17 LAB
ALBUMIN SERPL BCP-MCNC: 4.2 G/DL (ref 3.5–5.2)
ALP SERPL-CCNC: 141 U/L (ref 55–135)
ALT SERPL W/O P-5'-P-CCNC: 33 U/L (ref 10–44)
ANION GAP SERPL CALC-SCNC: 10 MMOL/L (ref 8–16)
AST SERPL-CCNC: 31 U/L (ref 10–40)
BASOPHILS # BLD AUTO: 0.02 K/UL (ref 0–0.2)
BASOPHILS NFR BLD: 0.4 % (ref 0–1.9)
BILIRUB SERPL-MCNC: 0.9 MG/DL (ref 0.1–1)
BUN SERPL-MCNC: 29 MG/DL (ref 8–23)
CALCIUM SERPL-MCNC: 8.8 MG/DL (ref 8.7–10.5)
CHLORIDE SERPL-SCNC: 100 MMOL/L (ref 95–110)
CO2 SERPL-SCNC: 28 MMOL/L (ref 23–29)
CREAT SERPL-MCNC: 1 MG/DL (ref 0.5–1.4)
DIFFERENTIAL METHOD BLD: ABNORMAL
EOSINOPHIL # BLD AUTO: 0.3 K/UL (ref 0–0.5)
EOSINOPHIL NFR BLD: 5.4 % (ref 0–8)
ERYTHROCYTE [DISTWIDTH] IN BLOOD BY AUTOMATED COUNT: 13.2 % (ref 11.5–14.5)
EST. GFR  (NO RACE VARIABLE): >60 ML/MIN/1.73 M^2
FERRITIN SERPL-MCNC: 215 NG/ML (ref 20–300)
GLUCOSE SERPL-MCNC: 142 MG/DL (ref 70–110)
HCT VFR BLD AUTO: 38.1 % (ref 40–54)
HGB BLD-MCNC: 12.6 G/DL (ref 14–18)
IMM GRANULOCYTES # BLD AUTO: 0.06 K/UL (ref 0–0.04)
IMM GRANULOCYTES NFR BLD AUTO: 1.1 % (ref 0–0.5)
IRON SERPL-MCNC: 65 UG/DL (ref 45–160)
LYMPHOCYTES # BLD AUTO: 0.7 K/UL (ref 1–4.8)
LYMPHOCYTES NFR BLD: 12.6 % (ref 18–48)
MCH RBC QN AUTO: 30.6 PG (ref 27–31)
MCHC RBC AUTO-ENTMCNC: 33.1 G/DL (ref 32–36)
MCV RBC AUTO: 93 FL (ref 82–98)
MONOCYTES # BLD AUTO: 0.4 K/UL (ref 0.3–1)
MONOCYTES NFR BLD: 7.6 % (ref 4–15)
NEUTROPHILS # BLD AUTO: 4 K/UL (ref 1.8–7.7)
NEUTROPHILS NFR BLD: 72.9 % (ref 38–73)
NRBC BLD-RTO: 0 /100 WBC
PLATELET # BLD AUTO: 133 K/UL (ref 150–450)
PMV BLD AUTO: 12.5 FL (ref 9.2–12.9)
POTASSIUM SERPL-SCNC: 4 MMOL/L (ref 3.5–5.1)
PROT SERPL-MCNC: 7.1 G/DL (ref 6–8.4)
RBC # BLD AUTO: 4.12 M/UL (ref 4.6–6.2)
SATURATED IRON: 22 % (ref 20–50)
SODIUM SERPL-SCNC: 138 MMOL/L (ref 136–145)
TOTAL IRON BINDING CAPACITY: 297 UG/DL (ref 250–450)
TRANSFERRIN SERPL-MCNC: 212 MG/DL (ref 200–375)
WBC # BLD AUTO: 5.41 K/UL (ref 3.9–12.7)

## 2024-12-17 PROCEDURE — 84466 ASSAY OF TRANSFERRIN: CPT | Performed by: INTERNAL MEDICINE

## 2024-12-17 PROCEDURE — 85025 COMPLETE CBC W/AUTO DIFF WBC: CPT | Performed by: INTERNAL MEDICINE

## 2024-12-17 PROCEDURE — 36415 COLL VENOUS BLD VENIPUNCTURE: CPT | Performed by: INTERNAL MEDICINE

## 2024-12-17 PROCEDURE — 82728 ASSAY OF FERRITIN: CPT | Performed by: INTERNAL MEDICINE

## 2024-12-17 PROCEDURE — 80053 COMPREHEN METABOLIC PANEL: CPT | Performed by: INTERNAL MEDICINE

## 2024-12-20 ENCOUNTER — HOSPITAL ENCOUNTER (OUTPATIENT)
Dept: RADIOLOGY | Facility: HOSPITAL | Age: 74
Discharge: HOME | End: 2024-12-20
Attending: NURSE PRACTITIONER
Payer: MEDICARE

## 2024-12-20 DIAGNOSIS — N64.9 LESION OF RIGHT NIPPLE: ICD-10-CM

## 2024-12-20 DIAGNOSIS — N64.4 BREAST PAIN, RIGHT: ICD-10-CM

## 2024-12-20 PROCEDURE — 77066 DX MAMMO INCL CAD BI: CPT | Mod: 26,,, | Performed by: RADIOLOGY

## 2024-12-20 PROCEDURE — 77062 BREAST TOMOSYNTHESIS BI: CPT | Mod: 26,,, | Performed by: RADIOLOGY

## 2024-12-20 PROCEDURE — 77066 DX MAMMO INCL CAD BI: CPT | Mod: TC,PO

## 2024-12-23 ENCOUNTER — LAB VISIT (OUTPATIENT)
Dept: LAB | Facility: HOSPITAL | Age: 74
End: 2024-12-23
Attending: NURSE PRACTITIONER
Payer: MEDICARE

## 2024-12-23 ENCOUNTER — OFFICE VISIT (OUTPATIENT)
Facility: CLINIC | Age: 74
End: 2024-12-23
Payer: MEDICARE

## 2024-12-23 ENCOUNTER — PATIENT MESSAGE (OUTPATIENT)
Facility: CLINIC | Age: 74
End: 2024-12-23

## 2024-12-23 VITALS
RESPIRATION RATE: 18 BRPM | SYSTOLIC BLOOD PRESSURE: 119 MMHG | HEART RATE: 68 BPM | WEIGHT: 147.88 LBS | TEMPERATURE: 98 F | HEIGHT: 66 IN | DIASTOLIC BLOOD PRESSURE: 61 MMHG | BODY MASS INDEX: 23.77 KG/M2

## 2024-12-23 DIAGNOSIS — E03.9 HYPOTHYROIDISM, UNSPECIFIED TYPE: ICD-10-CM

## 2024-12-23 DIAGNOSIS — N64.4 NIPPLE SORENESS: ICD-10-CM

## 2024-12-23 DIAGNOSIS — N62 GYNECOMASTIA: ICD-10-CM

## 2024-12-23 DIAGNOSIS — C32.9 LARYNGEAL CANCER: Primary | ICD-10-CM

## 2024-12-23 DIAGNOSIS — E03.9 HYPOTHYROIDISM, UNSPECIFIED TYPE: Primary | ICD-10-CM

## 2024-12-23 LAB
T4 FREE SERPL-MCNC: 1.29 NG/DL (ref 0.71–1.51)
TSH SERPL DL<=0.005 MIU/L-ACNC: 0.02 UIU/ML (ref 0.34–5.6)

## 2024-12-23 PROCEDURE — 1157F ADVNC CARE PLAN IN RCRD: CPT | Mod: CPTII,S$GLB,, | Performed by: NURSE PRACTITIONER

## 2024-12-23 PROCEDURE — G2211 COMPLEX E/M VISIT ADD ON: HCPCS | Mod: S$GLB,,, | Performed by: NURSE PRACTITIONER

## 2024-12-23 PROCEDURE — 3072F LOW RISK FOR RETINOPATHY: CPT | Mod: CPTII,S$GLB,, | Performed by: NURSE PRACTITIONER

## 2024-12-23 PROCEDURE — 3288F FALL RISK ASSESSMENT DOCD: CPT | Mod: CPTII,S$GLB,, | Performed by: NURSE PRACTITIONER

## 2024-12-23 PROCEDURE — 99215 OFFICE O/P EST HI 40 MIN: CPT | Mod: S$GLB,,, | Performed by: NURSE PRACTITIONER

## 2024-12-23 PROCEDURE — 84439 ASSAY OF FREE THYROXINE: CPT | Performed by: NURSE PRACTITIONER

## 2024-12-23 PROCEDURE — 3008F BODY MASS INDEX DOCD: CPT | Mod: CPTII,S$GLB,, | Performed by: NURSE PRACTITIONER

## 2024-12-23 PROCEDURE — 84402 ASSAY OF FREE TESTOSTERONE: CPT | Performed by: NURSE PRACTITIONER

## 2024-12-23 PROCEDURE — 3044F HG A1C LEVEL LT 7.0%: CPT | Mod: CPTII,S$GLB,, | Performed by: NURSE PRACTITIONER

## 2024-12-23 PROCEDURE — 1159F MED LIST DOCD IN RCRD: CPT | Mod: CPTII,S$GLB,, | Performed by: NURSE PRACTITIONER

## 2024-12-23 PROCEDURE — 99999 PR PBB SHADOW E&M-EST. PATIENT-LVL III: CPT | Mod: PBBFAC,,, | Performed by: NURSE PRACTITIONER

## 2024-12-23 PROCEDURE — 1101F PT FALLS ASSESS-DOCD LE1/YR: CPT | Mod: CPTII,S$GLB,, | Performed by: NURSE PRACTITIONER

## 2024-12-23 PROCEDURE — 84480 ASSAY TRIIODOTHYRONINE (T3): CPT | Performed by: NURSE PRACTITIONER

## 2024-12-23 PROCEDURE — 3074F SYST BP LT 130 MM HG: CPT | Mod: CPTII,S$GLB,, | Performed by: NURSE PRACTITIONER

## 2024-12-23 PROCEDURE — 3078F DIAST BP <80 MM HG: CPT | Mod: CPTII,S$GLB,, | Performed by: NURSE PRACTITIONER

## 2024-12-23 PROCEDURE — 1126F AMNT PAIN NOTED NONE PRSNT: CPT | Mod: CPTII,S$GLB,, | Performed by: NURSE PRACTITIONER

## 2024-12-23 PROCEDURE — 84443 ASSAY THYROID STIM HORMONE: CPT | Performed by: NURSE PRACTITIONER

## 2024-12-23 PROCEDURE — 36415 COLL VENOUS BLD VENIPUNCTURE: CPT | Performed by: NURSE PRACTITIONER

## 2024-12-23 PROCEDURE — 83001 ASSAY OF GONADOTROPIN (FSH): CPT | Performed by: NURSE PRACTITIONER

## 2024-12-23 PROCEDURE — 84403 ASSAY OF TOTAL TESTOSTERONE: CPT | Performed by: NURSE PRACTITIONER

## 2024-12-23 PROCEDURE — 83002 ASSAY OF GONADOTROPIN (LH): CPT | Performed by: NURSE PRACTITIONER

## 2024-12-23 RX ORDER — SODIUM CHLORIDE 0.9 % (FLUSH) 0.9 %
10 SYRINGE (ML) INJECTION
Status: CANCELLED | OUTPATIENT
Start: 2024-12-23

## 2024-12-23 RX ORDER — LEVOTHYROXINE SODIUM 100 UG/1
100 TABLET ORAL
Qty: 30 TABLET | Refills: 11 | Status: SHIPPED | OUTPATIENT
Start: 2024-12-23 | End: 2025-12-23

## 2024-12-23 RX ORDER — HEPARIN 100 UNIT/ML
500 SYRINGE INTRAVENOUS
Status: CANCELLED | OUTPATIENT
Start: 2024-12-23

## 2024-12-23 NOTE — PROGRESS NOTES
[facility] Hematology/Oncology  PROGRESS NOTE -  Follow-up Visit      Subjective:       Patient ID:   NAME: Cyrus Batres Jr. : 1950     74 y.o. 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  The patient is here today to go over the results of the recently ordered labs, tests and studies. He is here by himself.    He previously had had completed chemotherapy and  XRT.      He saw Dr James with ENT again 2024 with DL with good report per patient.     He saw Dr Patterson on 2024; Dena Silveira NP with endocrine in 2024    He repeat CT scan on 2024 that showed no evidence of disease.     He met with cardiology to discuss cardiac calcifications.     He is doing well and is active at home.      He is breathing ok. No HA's or CP; no pain at this time    He does have some new right nipple pain, and had a mammogram on 2024     He is concerned about trying to get rid of the nipple tenderness.      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 He had his portacath removed on  and replaced on 2023 by Dr Le     Discussed covid19 and he has been vaccinated      ROS:   GEN: normal without any fever, night sweats or weight loss; doing well with tube feeds   HEENT: normal with no HA's, sore throat, stiff neck, changes in vision  CV: normal with no CP, SOB, PND, no  current GRIFFIN  PULM: normal with no SOB, cough, hemoptysis, sputum or pleuritic pain  GI: no current N/V  ; has peg tube;    : normal with no hematuria, dysuria  BREAST: normal with no mass, discharge, pain + new right nipple pain - gynecomastia  SKIN: normal with no rash, erythema, bruising, or swelling     Pain Scale:  0    Allergies:  Review of patient's allergies indicates:   Allergen Reactions    Lovastatin Itching    Pollen extracts     Lovastatin Rash     Not confirmed but pt skeptical       Medications:    Current Outpatient Medications:     acetaminophen (TYLENOL) 325 MG tablet, Take 325 mg by mouth every 6 (six) hours as needed for Pain., Disp: , Rfl:     calcium carbonate 500 mg/5 mL (1,250 mg/5 mL), Take 20 mls by mouth four times daily with meals and nightly., Disp: 2300 mL, Rfl: 12    diclofenac sodium (VOLTAREN ARTHRITIS PAIN) 1 % Gel, Apply 2 g topically once daily. Apply couple of times a day on the affected arthritis pain., Disp: 100 g, Rfl: 1    esomeprazole (NEXIUM) 40 MG capsule, 40 mg before breakfast., Disp: , Rfl:     levothyroxine (SYNTHROID) 125 MCG tablet, 1 tablet (125 mcg total) by Per G Tube route before breakfast., Disp: 90 tablet, Rfl: 3    promethazine-dextromethorphan (PROMETHAZINE-DM) 6.25-15 mg/5 mL Syrp, Take 5 mLs by mouth every 12 (twelve) hours as needed (cough and cogestion)., Disp: 300 mL, Rfl: 5    PMHx/PSHx Updates:  See patient's last visit with Dr. Tamez on 8/20/2024  See H&P on 9/23/2022        Pathology:   Cancer Staging   Larynx cancer  Staging form: Larynx - Glottis, AJCC 8th Edition  - Clinical stage from 8/6/2020: Stage II (cT2, cN0, cM0) - Signed by Fariha Muñoz NP on 8/6/2020  - Pathologic stage from 1/20/2022: Stage LOU (pT4a, pN0, cM0) - Signed by Fariha Muñoz NP on 1/20/2022  Staging form: Pharynx - P16 Negative Oropharynx, AJCC 8th Edition  - Clinical: Stage II (rcT2, cN0, cM0) - Signed by Matheus Portillo Jr., MD on 5/30/2022    Malignant  "neoplasm of base of tongue  Staging form: Pharynx - P16 Negative Oropharynx, AJCC 8th Edition  - Clinical stage from 5/20/2022: Stage II (cT2, cN0, cM0, p16-) - Signed by Saúl Kessler MD on 7/7/2022    Dissection 11/9/2022:    Final Pathologic Diagnosis 1. "left submandibular lymph node", dissection:       - Three lymph nodes, negative for carcinoma (0/3)   2. 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       - Tumor size: 4.5 cm       - Resection margins: left superior pharyngeal margin focally involved;   all other margins are negative for carcinoma       - Left internal jugular vein: free of tumor       - One lymph node, negative for carcinoma (0/1)   Note: P16 immunostain is negative     Tumor Site       Oropharynx  involving Base of tongue       Tumor Laterality       Midline       Tumor Size       Greatest Dimension (Centimeters) 4.5 cm         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)       Histologic Grade       G2 to G3: Moderate to Poorly differentiated       Lymphovascular Invasion       Not identified       Perineural Invasion       Not identified     MARGINS      Margins       Involved by invasive tumor     Margin(s)   Involved by Invasive Tumor:      left superior pharyngeal margin; all other   margins are free of tumor     LYMPH NODES    Regional Lymph Node Status:    :     Regional Lymph Node   Status:      All regional lymph nodes negative for tumor      pT Category:               pT3   pN Category:               pN0      Base of Tongue Biopsy  4/27/2022:  Final Pathologic Diagnosis BIOPSY OF BASE OF THE TONGUE:   THE INFILTRATING POORLY DIFFERENTIATED SQUAMOUS CELL CARCINOMA   THE TUMOR IS P16 NEGATIVE.  THE POSITIVE AND NEGATIVE CONTROLS STAINED   APPROPRIATELY      Larynx resection/thyroidectomy 1/6/2022:     Final Pathologic Diagnosis 1. Lymph nodes, left neck levels " 2,  3 and 4, dissection:   - Nineteen lymph nodes, all  negative  for metastatic carcinoma (0/19)   2. Lymph nodes, right neck levels 2,  3 and 4, dissection:   - Twenty-eight lymph nodes, all  negative  for metastatic carcinoma (0/28)   3. Possible parathyroid gland, excision:   - Benign parathyroid gland tissue (0.003 g)   4. 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 Soft tissue, posterior tracheal margin, excision:   - Benign, focally reactive respiratory mucosa with submucosal acute   inflammation,  negative  for dysplasia or malignancy   6. Soft tissue, anterior tracheal margin, excision:   - Benign respiratory mucosa with subepithelial cartilage,  negative  for   dysplasia or malignancy   7. Soft tissue, posterior tracheal margin #2, excision:   - Benign, focally reactive respiratory mucosa with submucosal acute   inflammation,  negative  for dysplasia or malignancy   8. Soft tissue, anterior tracheal margin #2, excision:   - Benign, reactive focally ulcerated respiratory mucosa with submucosal acute   inflammation,  negative  for dysplasia or malignancy             Laryngoscope 8/4/2020: (with Dr Noel):  Final Pathologic Diagnosis 1.  Left true vocal fold, biopsy:       -  Invasive moderately differentiated squamous cell carcinoma,   keratinizing type   2.  Left true vocal fold, biopsy:       -  Invasive moderately differentiated squamous cell carcinoma,   keratinizing type             Laryngoscope 3/17/2020 (with Dr Xiao):  Final  "Pathologic Diagnosis 1.  BIOPSY OF LEFT TRUE VOCAL CORD:   SEVERELY DYSPLASTIC APPEARING SQUAMOUS MUCOSA   INVASIVE CARCINOMA IS NOT DOCUMENTED   ON THE OTHER HAND, THESE FRAGMENTS ARE NODUL AND WITHOUT SUBMUCOSA FOR   EVALUATION; IT IS POSSIBLE THAT THEY REFLECT INVASIVE SQUAMOUS CARCINOMA   2.  BIOPSY OF LEFT ANTERIOR COMMISSURE:   MODERATE DYSPLASIA   NO INVASIVE CARCINOMA IDENTIFIED             Objective:     Vitals:  Blood pressure 119/61, pulse 68, temperature 98 °F (36.7 °C), temperature source Temporal, resp. rate 18, height 5' 6" (1.676 m), weight 67.1 kg (147 lb 14.4 oz).    Physical Examination:   GEN: no apparent distress, comfortable; AAOx3  HEAD: atraumatic and normocephalic  EYES: no pallor, no icterus, PERRLA  ENT: OMM, no pharyngeal erythema, external ears WNL; no nasal discharge; no thrush; s/p laryngectomy with flap since healed well; trach   NECK: no masses, thyroid normal, trachea midline, no LAD/LN's, supple; post-op dissection healed well;    CV: RRR with no murmur; normal pulse; normal S1 and S2; no pedal edema; portacath   CHEST: Normal respiratory effort; CTAB; normal breath sounds; no wheeze or crackles  ABDOM: nontender and nondistended; soft; normal bowel sounds; no rebound/guarding; peg tube  MUSC/Skeletal: ROM normal; no crepitus; joints normal; no deformities or arthropathy  EXTREM: no clubbing, cyanosis, inflammation or swelling  SKIN: no rashes, lesions, ulcers, petechiae or subcutaneous nodules  : no mahan  NEURO: grossly intact; motor/sensory WNL; AAOx3; no tremors  PSYCH: normal mood, affect and behavior  LYMPH: normal cervical, supraclavicular, axillary and groin LN's    Breast: - right nipple fullness and different than left gynecomastia            Labs:     Lab Results   Component Value Date    WBC 5.41 12/17/2024    HGB 12.6 (L) 12/17/2024    HCT 38.1 (L) 12/17/2024    MCV 93 12/17/2024     (L) 12/17/2024     CMP  Sodium   Date Value Ref Range Status   12/17/2024 138 " 136 - 145 mmol/L Final     Potassium   Date Value Ref Range Status   12/17/2024 4.0 3.5 - 5.1 mmol/L Final     Chloride   Date Value Ref Range Status   12/17/2024 100 95 - 110 mmol/L Final     CO2   Date Value Ref Range Status   12/17/2024 28 23 - 29 mmol/L Final     Glucose   Date Value Ref Range Status   12/17/2024 142 (H) 70 - 110 mg/dL Final     BUN   Date Value Ref Range Status   12/17/2024 29 (H) 8 - 23 mg/dL Final     Creatinine   Date Value Ref Range Status   12/17/2024 1.0 0.5 - 1.4 mg/dL Final     Calcium   Date Value Ref Range Status   12/17/2024 8.8 8.7 - 10.5 mg/dL Final     Total Protein   Date Value Ref Range Status   12/17/2024 7.1 6.0 - 8.4 g/dL Final     Albumin   Date Value Ref Range Status   12/17/2024 4.2 3.5 - 5.2 g/dL Final   11/18/2020 3.7 3.6 - 5.1 g/dL Final     Comment:     For additional information, please refer to   http://education.[employer]/faq/FBZ047 (This link is   being provided for informational/ educational purposes only.)  This test was developed and its analytical performance   characteristics have been determined by [facility]. It has not been cleared or approved by the   US Food and Drug Administration. This assay has been validated   pursuant to the CLIA regulations and is used for clinical   purposes.  @ Test Performed By:  [employer] [facility]  Yo Cortes M.D.,   [address] [place] [address]  [facility]  [id number]       Total Bilirubin   Date Value Ref Range Status   12/17/2024 0.9 0.1 - 1.0 mg/dL Final     Comment:     For infants and newborns, interpretation of results should be based  on gestational age, weight and in agreement with clinical  observations.    Premature Infant recommended reference ranges:  Up to 24 hours.............<8.0 mg/dL  Up to 48 hours............<12.0 mg/dL  3-5 days..................<15.0 mg/dL  6-29 days.................<15.0 mg/dL       Alkaline  Phosphatase   Date Value Ref Range Status   12/17/2024 141 (H) 55 - 135 U/L Final     AST   Date Value Ref Range Status   12/17/2024 31 10 - 40 U/L Final     ALT   Date Value Ref Range Status   12/17/2024 33 10 - 44 U/L Final     Anion Gap   Date Value Ref Range Status   12/17/2024 10 8 - 16 mmol/L Final     eGFR if    Date Value Ref Range Status   07/29/2022 >60.0 >60 mL/min/1.73 m^2 Final     eGFR if non    Date Value Ref Range Status   07/29/2022 >60.0 >60 mL/min/1.73 m^2 Final     Comment:     Calculation used to obtain the estimated glomerular filtration  rate (eGFR) is the CKD-EPI equation.                   Radiology/Diagnostic Studies:    Mammogram 12/20/2024:   FINDINGS:  Bilateral retroglandular fibroglandular tissue with interspersed fatty elements, asymmetrically greater on the right.  No discrete mass, architectural distortion, or suspicious microcalcifications.     Impression:     Bilateral gynecomastia, asymmetrically greater on the right.     BI-RADS CATEGORY 2: BENIGN FINDING.     Recommendation: Clinical follow-up.        Electronically signed by:Jose De Jesus Oleary  Date:                                            12/20/2024  Time:                                           16:09        Exam Ended: 12/20/24 15:41 CST         US abdomen: 9/20/2024  Impression:     Negative ultrasound of the liver     No evidence bowel stent        Electronically signed by:Shira Menendez  Date:                                            09/20/2024  Time:                                           08:42      8/28/2024: CT neck     Impression:     Extensive postsurgical changes within the soft tissues of the neck without evidence of local recurrence of malignancy.     No evidence of metastatic disease.     Multiple incidental findings as noted above.  Multifocal narrowing of the intracranial segment of the right vertebral artery and extensive coronary arterial calcification is again  observed.        Electronically signed by:Jesika Atkins  Date:                                            08/28/2024  Time:                                           14:01        Exam Ended: 08/28/24 13:31 CDT       PET 2/7/2024:  IMPRESSION:     Extensive postoperative changes of the neck as outlined above, stable compared to prior PET/CT.  No convincing evidence of residual/recurrent FDG avid malignancy.     Mild FDG avidity of the proximal thoracic esophagus near the operative site, decreased compared to prior.     FDG avidity of arthritic right sternoclavicular joint          Ct Chest/Neck 9/21/2023:    IMPRESSION:     1. Extensive postsurgical changes throughout the neck as described, with no evidence of residual or recurrent malignancy.  2. Negative for metastatic disease throughout the neck or the chest.  3. 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  4. Coronary artery calcifications.        PET 5/30/2023:    IMPRESSION: Postsurgical changes from total glossectomy, laryngectomy and pharyngectomy with bilateral neck dissections     There is resolution of the previously noted fluid collections within the neck. There is mild FDG activity in the left side the neck adjacent to the neoesophagus as well as at the tracheostomy site to the right of the neoesophagus. Findings most likely are secondary to postsurgical and postinfection changes. There is no focal mass or adenopathy     No evidence of distant metastasis             CTA Neck    Result Date: 9/20/2022  EXAMINATION: CTA NECK CLINICAL HISTORY: Head/neck cancer, monitor;Malignant neoplasm of base of tongue TECHNIQUE: CT angiogram was performed from the level of the scott to the EAC following the IV administration of 75mL of Omnipaque 350.   Sagittal and coronal reconstructions and maximum intensity projection reconstructions were performed. Arterial stenosis percentages are based on NASCET measurement criteria. COMPARISON: CTA neck dated  05/20/2022 FINDINGS: Aortic arch and great vessels: There is a conventional left-sided 3 vessel arch.  The aortic arch is widely patent.  There is stable soft and calcified plaque in the proximal left subclavian artery with a similar appearance the prior study and possibly within radiation field but without critical stenosis or occlusion.  The right subclavian artery is patent.  The brachiocephalic trunk and origin of the left common carotid artery are patent. Carotid arteries Right carotid artery: There is calcified plaque scattered in the right common carotid artery without critical stenosis, occlusion or change.  There is no measurable stenosis of the internal carotid artery which is patent to the skull base. Left carotid artery: There is mild plaque scattered in the left common carotid artery without change and without critical stenosis or occlusion.  There is no measurable stenosis of the internal carotid artery. Vertebral arteries: The left vertebral artery is dominant and patent throughout its course in the neck.  The right vertebral artery is hypoplastic but patent.  There is no critical stenosis, occlusion, thrombus or dissection.  There is no change. The study extends intracranially.  There is calcified plaque in the V4 segment of the left vertebral artery resulting in a moderate to marked short segment nonocclusive stenosis.  The right vertebral artery partially terminates in a posteroinferior cerebellar artery with a short segment moderate to marked stenosis of the very distal right vertebral artery.  Some flow within the distal right vertebral artery may represent retrograde flow from the dominant left vertebral artery.  The basilar artery is patent.  The origins of the superior cerebellar and posterior cerebral artery on the right are patent.  There is fetal origin of the left posterior cerebral artery which is patent. There is plaque in the cavernous segments of both internal carotid arteries but  there is no critical stenosis or large vessel occlusion of the anterior circulation vessels.  There is no large aneurysm. Other: There is a similar appearance of the included upper lungs with pleural and parenchymal changes anteriorly in the upper lobes bilaterally.  As previously discussed, timing of the contrast bolus is performed during the arterial phase for CTA neck.  Therefore, enhancement of the soft tissues is somewhat suboptimal due to this technique. There has been a large region of soft tissue resection in the anterior mid and lower neck soft tissues with continued open defect in the upper chest and lower neck above the manubrium.  Soft tissue seen along the left posterolateral border of the trachea could represent secretions or mucus.  This was present previously. Redemonstrated is a large heterogeneously enhancing lesion centered at the level of the tongue base and floor of the mouth centrally into the left.  There is scattered calcifications.  The prior CTA demonstrated regions of central necrosis which are no longer definitely present but diffusely heterogeneous density and enhancement likely represents regions of necrosis.  This large heterogeneously enhancing soft tissue mass extends more anteriorly in the floor of the mouth on the left and measures at least 5.6 cm in greatest anterior to posterior dimension with 3.6 cm transverse dimension.  This does extend anteriorly to the medial margin of the body of the mandible on the left. There are post treatment changes with stranding in the neck fat.     1. Similar appearance of the neck vessels when compared to the prior CTA neck with mild narrowing at the origin of the left subclavian artery.  There is no critical stenosis, occlusion, thrombosis or dissection involving the cervical vertebral or carotid arteries. 2. Larger mass within the hypopharynx and tongue base extending anteriorly to the floor of the mouth on the left to the medial margin of the  body of the mandible without obvious bony destruction. 3. There is nonocclusive stenosis of the distal V4 segment of the left vertebral artery without change. 4. There is no large vessel occlusion or critical stenosis of the anterior circulation. 5. There is developmental variation with fetal origin of the left posterior cerebral artery. Electronically signed by: Rex Joyner MD Date:    09/20/2022 Time:    11:31    CT Abdomen Pelvis With Contrast    Result Date: 9/1/2022  CMS MANDATED QUALITY DATA - CT RADIATION - 436 All CT scans at this facility utilize dose modulation, iterative reconstruction, and/or weight based dosing when appropriate to reduce radiation dose to as low as reasonably achievable. Reason: abdominal pain partial history of laryngeal carcinoma TECHNIQUE: CT abdomen and pelvis with 100 mL Omnipaque 350. COMPARISON: PET/CT 5/13/2022 CT ABDOMEN: Coronary artery calcification and extremely tiny hiatal hernia incidentally noted. Visualized lung bases are clear. Liver, gallbladder, pancreas, spleen, adrenals, and kidneys are normal. Mild aortoiliac calcifications present. Gastrostomy tube tip lies in distal gastric body. Small intestines are unremarkable. Mild wall thickening affects the ascending colon with pneumatosis intestinalis diffusely affecting the ascending colon. No other free intraperitoneal gas or, and remainder of colon is normal. A normal appendix is present. The major mesenteric vascular structures are patent. No acute osseous abnormality. CT PELVIS: Prostate is slightly enlarged. Bladder is normal. No free pelvic fluid. Tiny right and small to moderate left fat-containing inguinal hernias are present. No acute osseous abnormality. IMPRESSION: 1. Pneumatosis intestinalis affecting the ascending colon with associated mild wall thickening. Etiology of this is undetermined. Potential considerations include intestinal ischemia or pneumatosis related to chemotherapy. Clinical and  laboratory correlation is needed to assess significance. No portal venous gas or free intraperitoneal air however. 2. Coronary artery calcifications Electronically signed by:  Nael Hui MD  9/1/2022 6:20 PM CDT Workstation: [id number]    NM PET CT Routine Skull to Mid Thigh    Result Date: 9/27/2022  PET CT WITH IMAGE FUSION HISTORY:  restage tongue cancer RESTAGING...  HX: TONGUE CA.  DX: April 2022.  LAST CHEMO TREATMENT WAS 3WKS AGO.  EVALUATE TX RESPONSE.   ALSO HX OF LARYNGEAL CA IN AUGUST 2020.  MULTIPLE SURGERIES: LARYNGECTOMY, THYROIDECTOMY & TRACHEOSTOMY.  RAD TX WAS COMPLETED IN NOV 2020. TECHNIQUE: Following IV administration of 11.2 mCi of F-18 labeled FDG into right antecubital fossa and a 60 minute delay, PET CT was performed from the vertex of the skull through the proximal thighs with an integrated PET CT scanner with image fusion. CT images were obtained to aid in attenuation correction and PET localization. The patient's serum glucose at the time of the exam was 136 mg/dL. COMPARISON: PET/CT 5/13/2022 FINDINGS: Poorly characterized soft tissue mass involving base of tongue approximately measures 4.3 x 2.8 cm (series 3 image 65) appearing similar to the prior exam. This reaches current max SUV of 11.8, not significantly changed from prior of 11.4. No other abnormal FDG activity. Intracranial compartment is unremarkable. Trace bilateral maxillary sinus mucosal thickening is present. Postoperative changes of laryngectomy and tracheostomy are present. Surgical clips occur throughout the neck. No definite enlarged cervical or supraclavicular lymph node, with evaluation limited by lack of IV contrast. Left subclavian port catheter tip terminates in SVC. Diffuse coronary artery calcifications are present. No enlarged mediastinal or axillary lymph nodes. No pulmonary nodule or mass. Gastrostomy tube tip lies in gastric body. Moderate aortoiliac calcifications are present. Small fat-containing left  inguinal hernia incidentally noted. Mild degenerative changes affect the spine. No suspicious osseous abnormality. IMPRESSION: 1. No significant change in the FDG avid mass involving the tongue base, remaining characteristic of malignancy. 2. No new FDG avid malignancy or metastatic disease. Electronically signed by:  Nael Hui MD  9/27/2022 2:38 PM CDT Workstation: [id number]    CT Abdomen Pelvis  Without Contrast    Result Date: 9/4/2022  CMS MANDATED QUALITY DATA - CT RADIATION  436 All CT scans at this facility utilize dose modulation, iterative reconstruction, and/or weight based dosing when appropriate to reduce radiation dose to as low as reasonably achievable. CT ABDOMEN PELVIS WITHOUT IV CONTRAST CLINICAL HISTORY: 72 years Male Follow-up pneumatosis COMPARISON: CT abdomen and pelvis September 1, 2022 FINDINGS: Imaging through the lower thorax demonstrates subsegmental atelectasis of the dependent lower lobes. Coronary artery calcification. Bone window images show no acute or aggressive osseous abnormality. Transitional lumbosacral vertebral body. On this unenhanced exam, no focal hepatic lesion. Gallbladder and biliary tree are unremarkable. Spleen appears normal. Pancreas is unremarkable. No adrenal lesion. No renal calculi or hydronephrosis. Ureters are normal in caliber. Urinary bladder is within normal limits. Gastrostomy tube is in place within the stomach. Stomach is largely collapsed. No evidence of small bowel obstruction. Pneumatosis and pericolonic abscess involving the ascending colon is redemonstrated, slightly diminished compared to prior. Mild wall thickening throughout the colon. No free fluid or free air within the abdomen or pelvis. Aortoiliac atherosclerotic calcification. No pathologically enlarged lymph nodes within the abdomen or pelvis. Bilateral fat-containing inguinal hernias, larger on the left. IMPRESSION: Pneumatosis and pericolonic gas about the ascending colon has decreased  in volume compared to prior. Persistent colonic wall thickening which could indicate colitis. Coronary and aortic atherosclerosis. Gastrostomy tube is within the stomach. Bilateral inguinal hernias. Electronically signed by:  Jose De Jesus Oleary MD  9/4/2022 4:06 PM CDT Workstation: [id number]    CTA neck  5/20/2022:     IMPRESSION:  Persistent mass within the hypopharynx consistent with malignancy.  By my measurements it is larger than on April 24, 2022 with central necrosis.  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 This is similar to the previous April 2022 study.  No evidence of arterial compromise related to malignancy.     PET 5/13/2022:     IMPRESSION:  1. Postoperative changes of prior laryngectomy and lymph node resection/radiation.  2. Masslike FDG avid lesion to the left of midline at the tongue base concerning for residual/recurrent disease.  3. Adjacent confluent nodular extension along the inferior left side of the mass.  4. No FDG avid lymphadenopathy or convincing additional sites of disease in the chest, abdomen or pelvis.     CT head 5/10/2022:  FINDINGS: Comparison to multiple prior exams. There is no acute intracranial hemorrhage, with no mass effect or abnormal extra-axial fluid. Mild scattered areas of nonspecific hypoattenuation involve the deep periventricular white matter, with gray-white differentiation maintained.     There is mild generalized prominence of the cortical sulci and ventricles. The cerebellum and brainstem are unremarkable. There are carotid siphon vascular calcifications. The visualized paranasal sinuses and mastoid air cells are clear. There is no acute calvarial fracture or scalp hematoma.     IMPRESSION: No acute intracranial hemorrhage or acute calvarial fracture     CT soft neck 4/24/2022;     Oral tongue/tongue base:  At the base of the tongue on the left there are findings of a heterogeneously enhancing mass  measuring 2.1 cm highly concerning for a neoplastic process.     True and false cords:  Patient status post laryngectomy which has been performed in the interim. Soft tissue stranding in the lower neck likely related to a previous flat reconstruction. No enhancement or lymphadenopathy within the lower neck.     Lymph node assessment:Negative     Surrounding soft tissues:  Negative     Vasculature:  Negative     Osseous structures:  Negative     Lung apices:  Scarring involving the lung apices bilaterally likely related to previous radiation.     IMPRESSION:  1. Postoperative and radiation changes involving the lower neck.  2. Findings highly concerning for a developing mass at the left base of the tongue enhancing 2.1 cm region. Direct visualization in this region would be of benefit.        CT soft neck  12/24/2021  IMPRESSION:  1.  Laryngeal mass as on prior exam. Laryngeal airway narrowing has not changed.  2.  No evidence for active hemorrhage.  3.  Partially visualized patchy groundglass infiltrates in both upper lobes. Also see CT chest report     CTA Chest 12/24/2021:  IMPRESSION:     1.  No pulmonary embolism.     2.  Soft tissue stranding in the region of the strap musculature with ill-defined hypodensity measuring 2.8 x 1.4 cm in the cervical midline.  Findings concerning for infectious process or abscess. Neoplastic process could have similar imaging characteristics.     3.  Suggested erosion of the proximal right parasymphyseal aspect of the thyroid shield.  Correlated with CT soft tissue neck findings. Findings could represent osteomyelitis. Neoplastic process cannot be excluded.     4.  Hepatic steatosis.  5.  Thickening of the gastric wall. Prominence of perigastric vasculature. Small hiatal hernia.     6.  Moderate stool burden.  Correlate for constipation.        PET 12/15/2021:  IMPRESSION:     Substantial qualitative and quantitative increase of hypermetabolic FDG activity associated with the  larynx in this patient with known supraglottic neoplasm.     No evidence of FDG avid metastatic disease involving neck, chest, abdomen or pelvis.     PET 8/21/2020:     Impression:     1. Intensely hypermetabolic plaque-like mass along the left true vocal cord, compatible with known laryngeal carcinoma.  2. No findings of regional metastatic disease in the neck, or distant metastatic disease.        CT Chest 8/10/2020:     IMPRESSION:     No metastatic disease within the chest.  Three-vessel coronary artery calcification. Nondilated cardiac  chambers     CT Soft neck 7/22/2020:  IMPRESSION:  1. Slight interval increase in size of the previously described  enhancing nodule along the anterior left vocal cord.  2. No pathologic lymphadenopathy.  3. Additional and incidental findings as noted above.     CT Soft neck  3/16/2020:     Impression:     6 mm enhancing focus involving the superior aspect of the left focal cord near the anterior commisure.  Recommend direct visualization for further evaluation of laryngeal polyp     Question of 2 mm submucosal lipoma involving the midportion of the superior aspect of the left vocal cord.     Mild arteriosclerosis involving the brachiocephalic arteries and carotid arteries     Mild degenerative change of the cervical spine        I have reviewed all available lab results and radiology reports.    Assessment/Plan:   (1) 74 y.o. 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     - Patient has been under the care of Dr Khoobehi with [facility] and Dr Portillo with rad/onc.   - He was recently found to have a new primary cancer involving the base of his tongue in April 2022. He was deemed not to be a candidate for any further radiation and did not want to undergo any further surgery as this would necessitate a glossectomy procedure.   - He has been on  adjuvant chemotherapy per direction of Dr Khoobehi and has had 3 cycles. His therapy course has been complicated with persistent diarrhea and N/V.      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 He saw Dr Lucero with Rad/onc this am. They are going to present his case to H&N Tumor Board at [facility] [place] and plans to see Dr James about surgical options. If he is not a surgical candidate then will proceed with cisplatin and XRT combine therapy.     10/6/2022:  - He saw Dr Lucero with Rad/onc, and Dr James and his case was presented to H&N Tumor Board at [facility] [place] and  he general consensus was to proceed to surgery.  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   - He had his portacath removed on 11/28.   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, keratinizing type, moderate to poorly differentiated tumor  - Four LN's were all negative  - he did have a positive left superior pharyngeal margin  - pT3 pN0  - reviewed the latest NCCN guidelines again from Version 1.2023; he may need some further systemic therapy due to the margin  - check on Rad/onc follow-up      1/23/2023:  - s/p new portacath placed on 1/12/2023 with Dr Le  - starting combined chemotherapy and XRT - cisplatin  - s/p chemotherapy school with [facility]    2/6/2023:  - he seems to be tolerating the chemotherapy well at this time  - continued with concomitant therapy with XRT    2/22/2023:  - he seems to be tolerating the chemotherapy well at this time  - continued with concomitant therapy with XRT  - labs relatively adequate  - will check on his refills    3/6/2023:  - finishing up XRT this comking Thursday  - last chemotherapy today    4/3/2023:  - He has since completed chemotherapy and  XRT.  He seems to have tolerated the regimen fairly well with no new complaints.  Some weight loss but reports he is staying hydrated. He does have some occasional GRIFFIN.   - He is seeing ENT tomorrow with Dr James    5/31/2023:  - He has since completed chemotherapy and  XRT.  He seems to have tolerated the regimen fairly well . He saw Dr James with ENT on 5/2/2023 and had repeat scope with good report per patient.  - He had recent PET yesterday on 5/30 which overall looks adequate    8/23/2023:  - sees Dr James again on 9/5/2023  - will set up repeat CT neck and chest for Sept 2023  - reach out to dietary about foods he can eat that are higher in iron  - consider IV iron x2 (he can not swallow pills)  - see if we can renew PT    11/15/2023:  - He previously had completed chemotherapy and  XRT.  He seems to have tolerated the regimen fairly well . He saw Dr James with ENT on 11/72023 and had repeat scope with good report per patient.  - He last saw Dr Patterson on 8/3/2023 and sees him again in Jan 2024, Dr Portillo on 6/19/2023  - He had last PET on 5/30  and CT Chest/Neck in Sept 2023  - he missed one IV iron  - due for up to date labs    2/20/2024:  - He saw Dr James with ENT 2/6/2024 with DL with good report per patient. He sees him again in 3 months  - He saw Dr Patterson on 2/5/2024  - He had PEt on 2/7/2024   -  some residual thrombocytopenia post-chemotherapy - monitor for now as this may take some time to recover     5/20/2024:  - He saw Dr James with ENT 5/7/2024 with DL with good report per patient. He sees him again on 6/18/2024 for ear flush.   - He saw Dr Patterson on 5/10/2024; Dena Silveira NP with endocrine in April 2024  - he had tube exchange with Dr Cooper in April 2024  - still having some hypocalcemia issues  - he declined referral to podiatrist for his right ankle issues    8/20/2024:  - he recently saw Dr James and had repeat LS  - sees Dr James again in Sepot 2024  - will order f/u CT chest/neck  - mild anemia and mild thrombocytopenia - will continue to monitor    11/19/2024:   - CT scan reviewed with patient   - new right nipple pain   - mammogram and US     12/23/2024:  - mammogram reviewed with patient   - he is still having breast tenderness   - ordered labs to eval for Thyrotoxicosis   - needs a follow up with endocrine          (2) Hx/of Laryngeal cancer in Aug 2020 stage II (cT2 N0)  - s/p radiation followed by bilateral neck dissection and total thyroidectomy     Brief Oncology Summary for his laryngeal CA hx:     Patient was noted to initially have a suspicious lesion on the left true vocal cord by laryngoscopy with Dr Xiao in March 2020 with biopsy showing severe dysplastic changes without definitive invasive process. He had a CT scan on 3/16/2020 which showed a 6mm nodule on the left vocal cord. A repeat Ct scan four months later on 7/22/2020 showed the nodule enlarged to 1.4 x 1.1 cm in size. Patient was then seen by Dr Noel and underwent repeat scope in Aug 2020 who found a bulky deeply invading tumor which was debulked and the pathology coming back moderately differentiated SCCA without lymphovascular or perineural invasion. Hs case was subsequently presented to the tumor board and the consensus was to proceed with radiation. He completed XRT on 10/30/2020 and proceeded with regular  laryngoscopy surveillance. He eventually required salvage laryngectomy and neck dissection with flap with Dr James on Jan 6th 2022. 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 Fifty lymph nodes were removed which were all negative. Pathology TNM was pT4a, pN0. Patient did not require any adjuvant therapy afterwards.               VISIT DIAGNOSES:      Laryngeal cancer    Gynecomastia  -     TSH; Future; Expected date: 12/23/2024  -     T3; Future; Expected date: 12/23/2024  -     T4, FREE; Future; Expected date: 12/23/2024  -     FOLLICLE STIMULATING HORMONE; Future; Expected date: 12/23/2024  -     LUTEINIZING HORMONE; Future; Expected date: 12/23/2024  -     TESTOSTERONE; Future; Expected date: 12/23/2024  -     SEX HORMONE BINDING GLOBULIN; Future; Expected date: 12/23/2024  -     Testosterone, free; Future; Expected date: 12/23/2024    Hypothyroidism, unspecified type  -     TSH; Future; Expected date: 12/23/2024  -     T3; Future; Expected date: 12/23/2024  -     T4, FREE; Future; Expected date: 12/23/2024  -     FOLLICLE STIMULATING HORMONE; Future; Expected date: 12/23/2024  -     LUTEINIZING HORMONE; Future; Expected date: 12/23/2024  -     TESTOSTERONE; Future; Expected date: 12/23/2024  -     SEX HORMONE BINDING GLOBULIN; Future; Expected date: 12/23/2024  -     Testosterone, free; Future; Expected date: 12/23/2024    Nipple soreness  -     TSH; Future; Expected date: 12/23/2024  -     T3; Future; Expected date: 12/23/2024  -     T4, FREE; Future; Expected date: 12/23/2024  -     FOLLICLE STIMULATING HORMONE; Future; Expected date: 12/23/2024  -     LUTEINIZING HORMONE; Future; Expected date: 12/23/2024  -     TESTOSTERONE; Future; Expected date: 12/23/2024  -     SEX HORMONE BINDING GLOBULIN; Future; Expected date: 12/23/2024  -     Testosterone, free; Future; Expected date:  12/23/2024              PLAN:  continue with ENT and rad/onc f/u as directed by them respectively - sees Dr James again in December  Continue to monitor labs - added hormone labs today due to gynecomastia   s/p chemotherapy school with me - discussed the side-effect profile, provided literature and obtained consents  F/u Rad/onc follow-up as directed  F/u with PCP, ENT, etc  Dietician f/u on the tube feeds   Mammogram and US to right breast due to new breast pain     RTC in 4 weeks with me and sooner if needed         Discussion:           COVID-19 Discussion:     I had long discussion with patient and any applicable family about the COVID-19 coronavirus epidemic and the recommended precautions with regard to cancer and/or hematology patients. I have re-iterated the CDC recommendations for adequate hand washing, use of hand -like products, and coughing into elbow, etc. In addition, especially for our patients who are on chemotherapy and/or our otherwise immunocompromised patients, I have recommended avoidance of crowds, including movie theaters, restaurants, churches, etc. I have recommended avoidance of any sick or symptomatic family members and/or friends. I have also recommended avoidance of any raw and unwashed food products, and general avoidance of food items that have not been prepared by themselves. The patient has been asked to call us immediately with any symptom developments, issues, questions or other general concerns.         Pathology Discussion:     I reviewed and discussed the pathology report(s) and radiograph reports (if available) in as simple to understand and/or laymen's terms to the best of my ability. I had an indepth conversation with the patient and went over the patient's individual diagnosis based on the information that was currently available. I discussed the TNM staging process with regard to the patient's particular cancer type, and the calculated stage based on the currently  "available TNM data and literature. I discussed the available prognostic data with regard to the current staging information and how it relates to the prognosis of their particular neoplastic process.          NCCN Guidelines:     I discussed the available treatment option(s) in accordance with the latest literature from the NCCN Clinical Practice Guidelines for the patient's particular type of cancer disorder. The NCCN Guidelines provide a "document evidence-based (and) consensus-driven management" of the care of oncology patients. The treatment recommendations were made not only in accordance to the NCCN guidelines, but also factored in to account the patient's overall age, condition, performance status and their medical co-morbidities. I went over the risks and benefits of the the treatment options (if any could be made) with regard to their particular cancer type, their cancer stage, their age, and their co-morbidities.         Chemotherapy Discussion:      I discussed the available treatment option(s) in accordance with the latest/current national evidence-based guidelines (NCCN, UpToDate, NCI, ASCO, etc where applicable), their overall age/condition and their co-morbidities. I also went over the risks and benefits of the chemotherapy with regard to their particular cancer type, their cancer stage, their age/condition, and their co-morbidities. I provided literature on the chemotherapy regimen and discussed the chemotherapy side-effect profiles of the drug(s). I discussed the importance of compliance with obtaining and monitoring weekly lab work, and went over the potential hematopathology issues and risks with anemia, leucopenia and thrombocytopenia that can occur with chemotherapy. I discussed the potential risks of liver and kidney damage, which could be permanent and could necessitate dialysis long-term if kidney failure developed. I discussed the emetic and/or diarrheal potential of the regimen and the " potential need for use of antiemetic and anti-diarrheal medications. I discussed the risk for development of anaphylactic shock, bronchospasm, dysrhythmia, and respiratory/cardiovascular arrest and/or failure. I discussed the potential risks for development of alopecia, cold sensory issues, ringing in ears, vertigo, cataracts, glaucoma, and neuropathy, all of which could end up being chronic and life-long. Some chemotherpyI discussed the risks of hand-foot syndrome and rashes, and development of other autoimmune mediated processes such as pneumonitis, hepatitis, and colitis which could be life threatening. I discussed the risks of the potential development of a rare but fatal viral mediated disease known as PML (Progressive Multifocal Leukoencephalopathy), and risk of future development of leukemia and/or lymphoma from use of certain chemotherapy agents. I discussed the need for neutropenic precautions, basic hygiene/sanitation behaviors and dietary restrictions.    The patient's consent has been obtained to proceed with the chemotherapy.The patient will be referred to Chemotherapy School /[facility] for training and education on chemotherapy, use of antiemetics and/or anti-diarrheals, use of NSAID's, potential chemotherapy side-effects, and any specific recommendations and precautions with the particular chemotherapy agents.      I answered all of the patient's (and family's, if applicable) questions to the best of my ability and to their complete satisfaction. The patient acknowledged full understanding of the risks, recommendations and plan(s).     I have explained and the patient understands all of  the current recommendation(s). I have answered all of their questions to the best of my ability and to their complete satisfaction.        I have explained all of the above in detail and the patient understands all of the current recommendation(s). I have answered all of their questions to the best of my  ability and to their complete satisfaction.   The patient is to continue with the current management plan.            Electronically signed by Briana Carrillo NP                  Answers submitted by the patient for this visit:  Review of Systems Questionnaire (Submitted on 12/20/2024)  appetite change : No  unexpected weight change: No  mouth sores: No  visual disturbance: No  cough: Yes  shortness of breath: No  chest pain: Yes  abdominal pain: No  diarrhea: No  frequency: No  back pain: No  rash: No  headaches: No  adenopathy: No  nervous/ anxious: No

## 2024-12-24 LAB
FSH SERPL-ACNC: 6.8 MIU/ML (ref 1.5–12.4)
LABCORP MISC TEST CODE: NORMAL
LABCORP MISC TEST NAME: NORMAL
LABCORP MISCELLANEOUS TEST: NORMAL
LH SERPL-ACNC: 9.9 MIU/ML (ref 1.7–8.6)
T3 SERPL-MCNC: 104 NG/DL (ref 71–180)
TESTOST SERPL-MCNC: 739 NG/DL (ref 264–916)

## 2024-12-26 ENCOUNTER — INFUSION (OUTPATIENT)
Dept: INFUSION THERAPY | Facility: HOSPITAL | Age: 74
End: 2024-12-26
Attending: INTERNAL MEDICINE
Payer: MEDICARE

## 2024-12-26 ENCOUNTER — PATIENT MESSAGE (OUTPATIENT)
Facility: CLINIC | Age: 74
End: 2024-12-26
Payer: MEDICARE

## 2024-12-26 VITALS
OXYGEN SATURATION: 95 % | HEART RATE: 66 BPM | WEIGHT: 146.81 LBS | HEIGHT: 66 IN | TEMPERATURE: 98 F | DIASTOLIC BLOOD PRESSURE: 63 MMHG | SYSTOLIC BLOOD PRESSURE: 114 MMHG | BODY MASS INDEX: 23.59 KG/M2 | RESPIRATION RATE: 18 BRPM

## 2024-12-26 DIAGNOSIS — C32.9 LARYNX CANCER: Primary | ICD-10-CM

## 2024-12-26 DIAGNOSIS — E86.0 DEHYDRATION: ICD-10-CM

## 2024-12-26 DIAGNOSIS — C01 MALIGNANT NEOPLASM OF BASE OF TONGUE: ICD-10-CM

## 2024-12-26 LAB — TESTOST FREE SERPL-MCNC: 6.4 PG/ML (ref 6.6–18.1)

## 2024-12-26 PROCEDURE — 96523 IRRIG DRUG DELIVERY DEVICE: CPT

## 2024-12-26 PROCEDURE — 63600175 PHARM REV CODE 636 W HCPCS: Performed by: INTERNAL MEDICINE

## 2024-12-26 RX ORDER — SODIUM CHLORIDE 0.9 % (FLUSH) 0.9 %
10 SYRINGE (ML) INJECTION
OUTPATIENT
Start: 2024-12-26

## 2024-12-26 RX ORDER — HEPARIN 100 UNIT/ML
500 SYRINGE INTRAVENOUS
OUTPATIENT
Start: 2024-12-26

## 2024-12-26 RX ORDER — HEPARIN 100 UNIT/ML
500 SYRINGE INTRAVENOUS
Status: DISCONTINUED | OUTPATIENT
Start: 2024-12-26 | End: 2024-12-26 | Stop reason: HOSPADM

## 2024-12-26 RX ORDER — SODIUM CHLORIDE 0.9 % (FLUSH) 0.9 %
10 SYRINGE (ML) INJECTION
Status: DISCONTINUED | OUTPATIENT
Start: 2024-12-26 | End: 2024-12-26 | Stop reason: HOSPADM

## 2024-12-26 RX ADMIN — HEPARIN 500 UNITS: 100 SYRINGE at 10:12

## 2024-12-27 ENCOUNTER — TELEPHONE (OUTPATIENT)
Dept: FAMILY MEDICINE | Facility: CLINIC | Age: 74
End: 2024-12-27
Payer: MEDICARE

## 2024-12-27 NOTE — TELEPHONE ENCOUNTER
Given the complex medical history and the presentation of gynecomastia in this 74-year-old male, here are some key points and additional workup considerations:  Hormonal Evaluation  The patient has a slightly elevated LH and low free testosterone, despite a normal total testosterone level. This discrepancy suggests an issue with the bioavailability of testosterone, which could be contributing to the gynecomastia.  The low TSH and normal T3 and free T4 levels indicate that the patient is likely on adequate thyroid replacement therapy, but the high dose of levothyroxine (250 mcg) should be reviewed to ensure it is not contributing to hormonal imbalances.  Additional Workup  Imaging: While a mammogram can be useful, ultrasound is often preferred for evaluating male breast tissue. It can help differentiate between gynecomastia and other conditions, such as malignancy[1][3].  Liver Function Tests: Since liver dysfunction can affect hormone metabolism, checking liver function tests (LFTs) could be helpful.  Renal Function Tests: Given the patient's history and marginal health, assessing renal function could provide insights into potential hormonal imbalances.  Tumor Markers and Imaging: To rule out any underlying tumors that could be causing hormonal imbalances, tumor markers (e.g., hCG, prolactin) and appropriate imaging studies (e.g., CT or MRI of the pituitary, adrenal glands, and testes) should be considered[1][5].  Clinical Considerations  Medication Review: Although the patient is not on known gynecomastia-causing medications, a thorough review of all medications, including those for cancer treatment, is essential.  Nutritional Status: Given the patient's low BMI and marginal health, ensuring adequate nutrition and addressing any malnutrition is crucial.  Low Free Testosterone  The low free testosterone level, despite normal total testosterone, could be indicative of poor health and possibly related to the patient's  overall condition, including the effects of cancer treatment and radiation therapy. It may also suggest an issue with sex hormone-binding globulin (SHBG) levels, which should be checked.  Levothyroxine Treatment  The high dose of levothyroxine should be reviewed to ensure it is not contributing to hormonal imbalances. Over-treatment with levothyroxine can lead to hyperthyroidism, which might affect other hormone levels.  In summary, a comprehensive hormonal evaluation, imaging studies, and a thorough review of the patient's medications and nutritional status are necessary to address the gynecomastia adequately.      Given the patient's presentation and the normal results from the mammogram and other tests, here are some key considerations and next steps:  Hormonal Imbalance  The patient has a slightly elevated LH, low free testosterone, and normal total testosterone, suggesting an imbalance between estrogen and androgen activity. This imbalance can be due to several factors, including increased sex hormone-binding globulin (SHBG) levels, which bind free testosterone more strongly than estrogen, reducing the bioavailable testosterone[2][3][4].  Thyroid Hormone Impact  The patient's low TSH and normal T3 and free T4 levels indicate that the current levothyroxine dose might be contributing to the hormonal imbalance. Hyperthyroidism or over-replacement with thyroid hormones can increase SHBG levels, leading to a relative decrease in free testosterone and an increase in the estrogen-to-androgen ratio, which can cause gynecomastia[1][3][4].  Additional Tests  SHBG Levels: Measuring SHBG levels could help clarify the hormonal imbalance. Elevated SHBG levels could explain the low free testosterone despite normal total testosterone[2][3][4].  Estrogen Levels: Checking estrogen levels (estradiol and estrone) would be helpful to assess the estrogen-to-androgen ratio directly[3][4].  Clinical Management  Review Levothyroxine  Dose: Given the potential impact of thyroid hormone levels on SHBG and the estrogen-to-androgen ratio, it may be necessary to adjust the levothyroxine dose to ensure the patient is not in a state of hyperthyroidism or over-replacement[1][3][4].  Monitor and Adjust: Regular monitoring of thyroid function tests, LH, and free testosterone levels after any adjustments to the levothyroxine dose can help in managing the gynecomastia.  By addressing these aspects, you can better understand and manage the patient's gynecomastia in the context of their overall health and hormonal balance.    For the 74-year-old male patient with gynecomastia, here are the key factors and explanations:  Hormonal Imbalance  The patient's gynecomastia is likely due to a hormonal imbalance, specifically a decrease in testosterone levels and an increase in the estrogen-to-androgen ratio. This imbalance is common in older men as testosterone levels naturally decline with age, while estrogen levels may remain the same or increase[1][3][5].  Aging and Aromatase Activity  Increased peripheral aromatase activity in adipose tissue with aging converts more androgens (like testosterone) to estrogens, contributing to the development of gynecomastia. This process is exacerbated by increased total body fat, which is common in older men[1][4][5].  Thyroid Hormone Impact  The patient's low TSH level suggests he might be on too high a dose of levothyroxine, which can contribute to hyperthyroidism or over-replacement. This condition can increase sex hormone-binding globulin (SHBG) levels, further reducing the bioavailability of testosterone and potentially exacerbating the estrogen-to-androgen imbalance[3][4].  SHBG and Bioavailable Testosterone  Although SHBG levels were not measured, it is known that SHBG increases with age in men. This increase can reduce the bioavailable testosterone, even if total testosterone levels are within the normal range,  contributing to gynecomastia[4].  Other Factors  The patient's overall health, including his history of head and neck cancer, radiation therapy, and current marginal health status, may also play a role in his hormonal balance and the development of gynecomastia[1][3][4].  In summary, the patient's gynecomastia is primarily due to the natural hormonal changes associated with aging, potentially exacerbated by his thyroid hormone replacement therapy and overall health status.    Here is a bibliography for the discussion on gynecomastia based on the sources provided:  Bibliography  Endoscopic subcutaneous mastectomy: A novel and effective approach for the treatment of gynecomastia  [employer], 2013  [https://www.C[employer].Power Efficiency/10.3892/etm.2013.1032][1] (https://www.[employer].Power Efficiency/10.3892/etm.2013.1032][1])  A curious case of gynaecomastia  Thyroid Research and Practice, 2014  [https://journals.lww.com/trap/fulltext/2014/11030/a_curious_case_of_gynaecomastia_.9.aspx][2] (https://journals.lww.com/trap/fulltext/2014/11030/a_curious_case_of_gynaecomastia_.9.aspx][2])  Gynecomastia: Etiology, Diagnosis, and Treatment  Endotext, [facility], 2023  [https://www.ncbi.nlm.nih.gov/books/JYK786952/][3] (https://www.ncbi.nlm.nih.gov/books/ERW315229/][3])  Gynecomastia as a presenting symptom of Graves' disease in a 50-year-old man   Journal of Endocrinology, 2021  [https://edm.PriceTaga.com/view/journals/edm/2021/1/KAO56-9461.xml][4] (https://[employer]ters.bookletmobile.com/view/journals/edm/2021/1/CRL43-6081.xml][4])  Gynecomastia  [employer], 2012  [https://www.aafp.org/pubs/afp/issues/2012/0401/p716.html][5] (https://www.aafp.org/pubs/afp/issues/2012/0401/p716.html][5])

## 2025-01-09 ENCOUNTER — OFFICE VISIT (OUTPATIENT)
Dept: OTOLARYNGOLOGY | Facility: CLINIC | Age: 75
End: 2025-01-09
Payer: MEDICARE

## 2025-01-09 VITALS — DIASTOLIC BLOOD PRESSURE: 72 MMHG | BODY MASS INDEX: 26.95 KG/M2 | WEIGHT: 167 LBS | SYSTOLIC BLOOD PRESSURE: 115 MMHG

## 2025-01-09 DIAGNOSIS — Z90.02 S/P LARYNGECTOMY: ICD-10-CM

## 2025-01-09 DIAGNOSIS — C79.89 SECONDARY MALIGNANT NEOPLASM OF OTHER SPECIFIED SITES: Primary | ICD-10-CM

## 2025-01-09 PROCEDURE — 99999 PR PBB SHADOW E&M-EST. PATIENT-LVL II: CPT | Mod: PBBFAC,,, | Performed by: OTOLARYNGOLOGY

## 2025-01-09 PROCEDURE — 99499 UNLISTED E&M SERVICE: CPT | Mod: S$GLB,,, | Performed by: OTOLARYNGOLOGY

## 2025-01-15 ENCOUNTER — LAB VISIT (OUTPATIENT)
Dept: LAB | Facility: HOSPITAL | Age: 75
End: 2025-01-15
Attending: INTERNAL MEDICINE
Payer: MEDICARE

## 2025-01-15 DIAGNOSIS — E11.9 TYPE 2 DIABETES MELLITUS WITHOUT COMPLICATION: ICD-10-CM

## 2025-01-15 DIAGNOSIS — C32.9 LARYNGEAL CANCER: ICD-10-CM

## 2025-01-15 DIAGNOSIS — D50.0 IRON DEFICIENCY ANEMIA DUE TO CHRONIC BLOOD LOSS: ICD-10-CM

## 2025-01-15 LAB
ALBUMIN SERPL BCP-MCNC: 4.3 G/DL (ref 3.5–5.2)
ALP SERPL-CCNC: 156 U/L (ref 55–135)
ALT SERPL W/O P-5'-P-CCNC: 36 U/L (ref 10–44)
ANION GAP SERPL CALC-SCNC: 6 MMOL/L (ref 8–16)
AST SERPL-CCNC: 34 U/L (ref 10–40)
BASOPHILS # BLD AUTO: 0.01 K/UL (ref 0–0.2)
BASOPHILS NFR BLD: 0.2 % (ref 0–1.9)
BILIRUB SERPL-MCNC: 1.1 MG/DL (ref 0.1–1)
BUN SERPL-MCNC: 29 MG/DL (ref 8–23)
CALCIUM SERPL-MCNC: 8.8 MG/DL (ref 8.7–10.5)
CHLORIDE SERPL-SCNC: 98 MMOL/L (ref 95–110)
CO2 SERPL-SCNC: 29 MMOL/L (ref 23–29)
CREAT SERPL-MCNC: 1.1 MG/DL (ref 0.5–1.4)
DIFFERENTIAL METHOD BLD: ABNORMAL
EOSINOPHIL # BLD AUTO: 0.3 K/UL (ref 0–0.5)
EOSINOPHIL NFR BLD: 4.9 % (ref 0–8)
ERYTHROCYTE [DISTWIDTH] IN BLOOD BY AUTOMATED COUNT: 13.1 % (ref 11.5–14.5)
EST. GFR  (NO RACE VARIABLE): >60 ML/MIN/1.73 M^2
FERRITIN SERPL-MCNC: 169.7 NG/ML (ref 20–300)
GLUCOSE SERPL-MCNC: 129 MG/DL (ref 70–110)
HCT VFR BLD AUTO: 37.5 % (ref 40–54)
HGB BLD-MCNC: 12.5 G/DL (ref 14–18)
IMM GRANULOCYTES # BLD AUTO: 0.03 K/UL (ref 0–0.04)
IMM GRANULOCYTES NFR BLD AUTO: 0.5 % (ref 0–0.5)
IRON SERPL-MCNC: 36 UG/DL (ref 45–160)
LYMPHOCYTES # BLD AUTO: 0.7 K/UL (ref 1–4.8)
LYMPHOCYTES NFR BLD: 12 % (ref 18–48)
MCH RBC QN AUTO: 30.8 PG (ref 27–31)
MCHC RBC AUTO-ENTMCNC: 33.3 G/DL (ref 32–36)
MCV RBC AUTO: 92 FL (ref 82–98)
MONOCYTES # BLD AUTO: 0.5 K/UL (ref 0.3–1)
MONOCYTES NFR BLD: 8.1 % (ref 4–15)
NEUTROPHILS # BLD AUTO: 4.6 K/UL (ref 1.8–7.7)
NEUTROPHILS NFR BLD: 74.3 % (ref 38–73)
NRBC BLD-RTO: 0 /100 WBC
PLATELET # BLD AUTO: 132 K/UL (ref 150–450)
PMV BLD AUTO: 12.1 FL (ref 9.2–12.9)
POTASSIUM SERPL-SCNC: 3.8 MMOL/L (ref 3.5–5.1)
PROT SERPL-MCNC: 7.1 G/DL (ref 6–8.4)
RBC # BLD AUTO: 4.06 M/UL (ref 4.6–6.2)
SATURATED IRON: 12 % (ref 20–50)
SODIUM SERPL-SCNC: 133 MMOL/L (ref 136–145)
TOTAL IRON BINDING CAPACITY: 307 UG/DL (ref 250–450)
TRANSFERRIN SERPL-MCNC: 219 MG/DL (ref 200–375)
WBC # BLD AUTO: 6.16 K/UL (ref 3.9–12.7)

## 2025-01-15 PROCEDURE — 84466 ASSAY OF TRANSFERRIN: CPT | Performed by: INTERNAL MEDICINE

## 2025-01-15 PROCEDURE — 36415 COLL VENOUS BLD VENIPUNCTURE: CPT | Performed by: INTERNAL MEDICINE

## 2025-01-15 PROCEDURE — 82728 ASSAY OF FERRITIN: CPT | Performed by: INTERNAL MEDICINE

## 2025-01-15 PROCEDURE — 80053 COMPREHEN METABOLIC PANEL: CPT | Performed by: INTERNAL MEDICINE

## 2025-01-15 PROCEDURE — 85025 COMPLETE CBC W/AUTO DIFF WBC: CPT | Performed by: INTERNAL MEDICINE

## 2025-01-17 ENCOUNTER — PATIENT MESSAGE (OUTPATIENT)
Facility: CLINIC | Age: 75
End: 2025-01-17
Payer: MEDICARE

## 2025-01-17 DIAGNOSIS — E03.9 HYPOTHYROIDISM, UNSPECIFIED TYPE: ICD-10-CM

## 2025-01-17 RX ORDER — LEVOTHYROXINE SODIUM 100 UG/1
100 TABLET ORAL
Qty: 90 TABLET | Refills: 3 | Status: SHIPPED | OUTPATIENT
Start: 2025-01-17 | End: 2026-01-17

## 2025-01-23 DIAGNOSIS — E11.9 TYPE 2 DIABETES MELLITUS WITHOUT COMPLICATION: ICD-10-CM

## 2025-01-24 ENCOUNTER — OFFICE VISIT (OUTPATIENT)
Facility: CLINIC | Age: 75
End: 2025-01-24
Payer: MEDICARE

## 2025-01-24 ENCOUNTER — CLINICAL SUPPORT (OUTPATIENT)
Facility: CLINIC | Age: 75
End: 2025-01-24
Payer: MEDICARE

## 2025-01-24 VITALS
SYSTOLIC BLOOD PRESSURE: 115 MMHG | TEMPERATURE: 97 F | HEART RATE: 63 BPM | BODY MASS INDEX: 23.63 KG/M2 | WEIGHT: 147 LBS | HEIGHT: 66 IN | DIASTOLIC BLOOD PRESSURE: 66 MMHG | RESPIRATION RATE: 17 BRPM

## 2025-01-24 DIAGNOSIS — R10.9 ABDOMINAL PAIN, UNSPECIFIED ABDOMINAL LOCATION: ICD-10-CM

## 2025-01-24 DIAGNOSIS — Z71.3 DIETARY COUNSELING: Primary | ICD-10-CM

## 2025-01-24 DIAGNOSIS — E03.2 HYPOTHYROIDISM DUE TO MEDICATION: ICD-10-CM

## 2025-01-24 DIAGNOSIS — Z90.02 S/P LARYNGECTOMY: ICD-10-CM

## 2025-01-24 DIAGNOSIS — C32.9 LARYNGEAL CANCER: Primary | ICD-10-CM

## 2025-01-24 DIAGNOSIS — Z93.1 PRESENCE OF EXTERNALLY REMOVABLE PERCUTANEOUS ENDOSCOPIC GASTROSTOMY (PEG) TUBE: ICD-10-CM

## 2025-01-24 PROCEDURE — 1101F PT FALLS ASSESS-DOCD LE1/YR: CPT | Mod: CPTII,S$GLB,, | Performed by: NURSE PRACTITIONER

## 2025-01-24 PROCEDURE — 3008F BODY MASS INDEX DOCD: CPT | Mod: CPTII,S$GLB,, | Performed by: NURSE PRACTITIONER

## 2025-01-24 PROCEDURE — 99999 PR PBB SHADOW E&M-EST. PATIENT-LVL I: CPT | Mod: PBBFAC,,,

## 2025-01-24 PROCEDURE — 99214 OFFICE O/P EST MOD 30 MIN: CPT | Mod: S$GLB,,, | Performed by: NURSE PRACTITIONER

## 2025-01-24 PROCEDURE — 3288F FALL RISK ASSESSMENT DOCD: CPT | Mod: CPTII,S$GLB,, | Performed by: NURSE PRACTITIONER

## 2025-01-24 PROCEDURE — 3074F SYST BP LT 130 MM HG: CPT | Mod: CPTII,S$GLB,, | Performed by: NURSE PRACTITIONER

## 2025-01-24 PROCEDURE — 99999 PR PBB SHADOW E&M-EST. PATIENT-LVL IV: CPT | Mod: PBBFAC,,, | Performed by: NURSE PRACTITIONER

## 2025-01-24 PROCEDURE — 1157F ADVNC CARE PLAN IN RCRD: CPT | Mod: CPTII,S$GLB,, | Performed by: NURSE PRACTITIONER

## 2025-01-24 PROCEDURE — 1126F AMNT PAIN NOTED NONE PRSNT: CPT | Mod: CPTII,S$GLB,, | Performed by: NURSE PRACTITIONER

## 2025-01-24 PROCEDURE — 1159F MED LIST DOCD IN RCRD: CPT | Mod: CPTII,S$GLB,, | Performed by: NURSE PRACTITIONER

## 2025-01-24 PROCEDURE — G2211 COMPLEX E/M VISIT ADD ON: HCPCS | Mod: S$GLB,,, | Performed by: NURSE PRACTITIONER

## 2025-01-24 PROCEDURE — 3078F DIAST BP <80 MM HG: CPT | Mod: CPTII,S$GLB,, | Performed by: NURSE PRACTITIONER

## 2025-01-24 NOTE — PROGRESS NOTES
Medical Nutrition Therapy Oncology Progress Note      Patient's PCP:Maico Patterson MD  Referring Provider: No ref. provider found  Subjective:        Patient ID: Cyrus Batres Jr. is a 74 y.o. male.    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    Tongue cancer     Tracheostomy dependence     Type 2 diabetes mellitus, without long-term current use of insulin 11/22/2022    Unspecified glaucoma        Past Surgical History:   Procedure Laterality Date    CATARACT EXTRACTION W/  INTRAOCULAR LENS IMPLANT Right 8/16/2023    Procedure: CEIOL OD  NPO-peg;  Surgeon: Stoney Munoz MD;  Location: [facility] OR;  Service: Ophthalmology;  Laterality: Right;    CATARACT EXTRACTION W/  INTRAOCULAR LENS IMPLANT Left 10/18/2023    Procedure: CEIOL OS npo peg;  Surgeon: Stoney Munoz MD;  Location: [facility] OR;  Service: Ophthalmology;  Laterality: Left;    DIRECT LARYNGOBRONCHOSCOPY N/A 12/27/2021    Procedure: LARYNGOSCOPY, DIRECT, WITH BRONCHOSCOPY;  Surgeon: Flex Espinosa MD;  [facility];  Service: ENT;  Laterality: N/A;    DIRECT LARYNGOSCOPY  11/9/2022    Procedure: LARYNGOSCOPY, DIRECT;  Surgeon: Jesse James MD;  [facility];  Service: ENT;;    DISSECTION OF NECK Bilateral 1/6/2022    Procedure: DISSECTION, NECK;  Surgeon:  Jesse James MD;  Location: [facility] OR 2ND FLR;  Service: ENT;  Laterality: Bilateral;    DISSECTION OF NECK Bilateral 11/9/2022    Procedure: DISSECTION, NECK;  Surgeon: Jesse James MD;  Location: [facility] OR [facility] FLR;  Service: ENT;  Laterality: Bilateral;    ESOPHAGOGASTRODUODENOSCOPY N/A 4/9/2024    Procedure: EGD (ESOPHAGOGASTRODUODENOSCOPY);  Surgeon: Matheus Cooper III, MD;  Location: [facility] ENDO;  Service: Endoscopy;  Laterality: N/A;    FLAP PROCEDURE Right 1/6/2022    Procedure: CREATION, FREE FLAP;  Surgeon: Alise Hart MD;  Location: [facility] OR [facility] FLR;  Service: ENT;  Laterality: Right;  Ischemic start 1351  Ischemic stop 1502    FLAP PROCEDURE Left 11/9/2022    Procedure: CREATION, FREE FLAP, ALT;  Surgeon: Alise Hart MD;  Location: [facility] OR [facility] FLR;  Service: ENT;  Laterality: Left;    GLOSSECTOMY Bilateral 11/9/2022    Procedure: TOTAL GLOSSECTOMY;  Surgeon: Jesse James MD;  [facility];  Service: ENT;  Laterality: Bilateral;    INSERTION OF TUNNELED CENTRAL VENOUS CATHETER (CVC) WITH SUBCUTANEOUS PORT N/A 6/9/2022    Procedure: [id number];  Surgeon: Jesus Viera MD;  Location: [facility] OR;  Service: General;  Laterality: N/A;    INSERTION OF TUNNELED CENTRAL VENOUS CATHETER (CVC) WITH SUBCUTANEOUS PORT Right 1/12/2023    Procedure: [id number];  Surgeon: Abdulaziz Le Jr., MD;  Location: [facility] OR;  Service: General;  Laterality: Right;    LARYNGECTOMY N/A 1/6/2022    Procedure: LARYNGECTOMY;  Surgeon: Jesse James MD;  [facility];  Service: ENT;  Laterality: N/A;    LARYNGOSCOPY N/A 8/4/2020    Procedure: Suspension microlaryngoscopy with biopsy, possible KTP laser treatment/excision;  Surgeon: Stew Noel MD;  Location: [facility] OR [facility] FLR;  Service: ENT;  Laterality: N/A;  Microscope, telescopes, tower, microinstruments, KTP laser, rep conf# [id number] IC 7/28.    LARYNGOSCOPY N/A 3/16/2021    Procedure:  Suspension microlaryngoscopy with excision of lesion, possible CO2 laser;  Surgeon: Stew Noel MD;  [facility];  Service: ENT;  Laterality: N/A;  Microscope, telescopes, tower, microinstruments, CO2 laser, rep conf# [id number] IC 3/4.    LARYNGOSCOPY N/A 4/1/2021    Procedure: Suspension microlaryngoscopy with KTP laser excision of lesion;  Surgeon: Stew Noel MD;  [facility];  Service: ENT;  Laterality: N/A;  Microscope, telescopes, tower, microinstruments, 70 degree scope, vocal fold , KTP laser, rep conf# [id number] BC    LARYNGOSCOPY N/A 12/9/2021    Procedure: Suspension microlaryngoscopy with biopsy;  Surgeon: Stew Noel MD;  [facility];  Service: ENT;  Laterality: N/A;  Microscope, telescopes, tower, microinstruments    LARYNGOSCOPY N/A 1/6/2022    Procedure: LARYNGOSCOPY;  Surgeon: Jesse James MD;  [facility];  Service: ENT;  Laterality: N/A;    LARYNGOSCOPY N/A 4/27/2022    Procedure: LARYNGOSCOPY WITH BIOPSY;  Surgeon: Jesse James MD;  [facility];  Service: ENT;  Laterality: N/A;    MICROLARYNGOSCOPY N/A 3/17/2020    Procedure: MICROLARYNGOSCOPY;  Surgeon: Jung Xiao MD;  Location: [facility];  Service: ENT;  Laterality: N/A;  Laser Microlaryngoscopy  NEED TO SCHEDULE LASER from [facility] [id number]    PHARYNGECTOMY  11/9/2022    Procedure: TOTAL PHARYNGECTOMY;  Surgeon: Jesse James MD;  Location: [facility] OR [address] [place] [address];  Service: ENT;;    REIMPLANTATION OF PARATHYROID TISSUE N/A 1/6/2022    Procedure: REIMPLANTATION, PARATHYROID TISSUE;  Surgeon: Jesse James MD;  [facility];  Service: ENT;  Laterality: N/A;    SKIN SPLIT GRAFT Right 11/9/2022    Procedure: APPLICATION, GRAFT, SKIN, SPLIT-THICKNESS;  Surgeon: Jesse James MD;  Location: [facility] OR [address] [place] [address];  Service: ENT;  Laterality: Right;    THYROIDECTOMY  1/6/2022    Procedure: THYROIDECTOMY;   Surgeon: Jesse James MD;  Location: [facility] OR [address] [place] [address];  Service: ENT;;    TRACHEOSTOMY N/A 12/27/2021    Procedure: CREATION, TRACHEOSTOMY;  Surgeon: Flex Espinosa MD;  [facility];  Service: ENT;  Laterality: N/A;       Social History     Socioeconomic History    Marital status:      Spouse name: Janel Batres    Number of children: 2   Occupational History    Occupation: [employer]     Employer: [employer]   Tobacco Use    Smoking status: Never    Smokeless tobacco: Never   Substance and Sexual Activity    Alcohol use: Not Currently     Comment: occasional    Drug use: No    Sexual activity: Yes     Partners: Female   Social History Narrative    ** Merged History Encounter **         2 children from his 1st wife     Social Drivers of Health     Financial Resource Strain: Low Risk  (2/2/2024)    Overall Financial Resource Strain (CARDIA)     Difficulty of Paying Living Expenses: Not hard at all   Food Insecurity: No Food Insecurity (2/2/2024)    Hunger Vital Sign     Worried About Running Out of Food in the Last Year: Never true     Ran Out of Food in the Last Year: Never true   Transportation Needs: No Transportation Needs (2/2/2024)    PRAPARE - Transportation     Lack of Transportation (Medical): No     Lack of Transportation (Non-Medical): No   Physical Activity: Insufficiently Active (2/2/2024)    Exercise Vital Sign     Days of Exercise per Week: 3 days     Minutes of Exercise per Session: 30 min   Stress: No Stress Concern Present (2/2/2024)    [employer] [place] [employer] - Occupational Stress Questionnaire     Feeling of Stress : Not at all   Housing Stability: Low Risk  (2/2/2024)    Housing Stability Vital Sign     Unable to Pay for Housing in the Last Year: No     Number of Places Lived in the Last Year: 1     Unstable Housing in the Last Year: No       Family History   Problem Relation Name Age of Onset    Abnormal EKG Mother Hayley     Diabetes Father  Nicolás     Heart disease Father Nicolás     Hypertension Father Nicolás        Review of patient's allergies indicates:   Allergen Reactions    Lovastatin Itching    Pollen extracts     Lovastatin Rash     Not confirmed but pt skeptical       Current Outpatient Medications:     acetaminophen (TYLENOL) 325 MG tablet, Take 325 mg by mouth every 6 (six) hours as needed for Pain., Disp: , Rfl:     calcium carbonate 500 mg/5 mL (1,250 mg/5 mL), Take 20 mls by mouth four times daily with meals and nightly., Disp: 2300 mL, Rfl: 12    diclofenac sodium (VOLTAREN ARTHRITIS PAIN) 1 % Gel, Apply 2 g topically once daily. Apply couple of times a day on the affected arthritis pain., Disp: 100 g, Rfl: 1    esomeprazole (NEXIUM) 40 MG capsule, 40 mg before breakfast., Disp: , Rfl:     levothyroxine (SYNTHROID) 100 MCG tablet, Take 1 tablet (100 mcg total) by mouth before breakfast., Disp: 90 tablet, Rfl: 3    levothyroxine (SYNTHROID) 125 MCG tablet, 1 tablet (125 mcg total) by Per G Tube route before breakfast., Disp: 90 tablet, Rfl: 3    promethazine-dextromethorphan (PROMETHAZINE-DM) 6.25-15 mg/5 mL Syrp, Take 5 mLs by mouth every 12 (twelve) hours as needed (cough and cogestion)., Disp: 300 mL, Rfl: 5    All medications and past history have been reviewed.    OP HEAD NECK CISPLATIN WEEKLY + RADIOTHERAPY      Treatment Goal:   Curative      Status:   Active      Start Date:   1/23/2023      End Date:   3/6/2023      Provider:   Venu Tamez MD      Chemotherapy:   CISplatin (Platinol) 40 mg/m2 = 66 mg in sodium chloride 0.9% 631 mL chemo infusion, 40 mg/m2 = 66 mg, Intravenous, Clinic/HOD 1 time, 7 of 7 cycles    Administration: 66 mg (1/23/2023), 66 mg (2/13/2023), 66 mg (2/6/2023), 66 mg (2/20/2023), 66 mg (2/27/2023), 66 mg (3/6/2023)      Objective:      Wt Readings    01/24/25 66.7 kg (147 lb)   12/26/24 66.6 kg (146 lb 12.8 oz)   12/23/24 67.1 kg (147 lb 14.4 oz)   11/26/24 65.8 kg (145 lb)   08/05/24 69 kg (152 lb  1.9 oz)   07/29/24 65.8 kg (145 lb)   07/08/24 67.1 kg (148 lb)   04/04/24 68.5 kg (151 lb)   11/21/23 66.9 kg (147 lb 7.8 oz)     Last Labs:  Last Labs:  Glucose   Date Value Ref Range Status   01/15/2025 129 (H) 70 - 110 mg/dL Final   12/17/2024 142 (H) 70 - 110 mg/dL Final     BUN   Date Value Ref Range Status   01/15/2025 29 (H) 8 - 23 mg/dL Final   12/17/2024 29 (H) 8 - 23 mg/dL Final     Creatinine   Date Value Ref Range Status   01/15/2025 1.1 0.5 - 1.4 mg/dL Final   12/17/2024 1.0 0.5 - 1.4 mg/dL Final     Sodium   Date Value Ref Range Status   01/15/2025 133 (L) 136 - 145 mmol/L Final   12/17/2024 138 136 - 145 mmol/L Final     Potassium   Date Value Ref Range Status   01/15/2025 3.8 3.5 - 5.1 mmol/L Final   12/17/2024 4.0 3.5 - 5.1 mmol/L Final     Phosphorus   Date Value Ref Range Status   11/24/2022 2.4 (L) 2.7 - 4.5 mg/dL Final   11/23/2022 3.6 2.7 - 4.5 mg/dL Final     Calcium   Date Value Ref Range Status   01/15/2025 8.8 8.7 - 10.5 mg/dL Final   12/17/2024 8.8 8.7 - 10.5 mg/dL Final     Prealbumin   Date Value Ref Range Status   09/03/2022 19 (L) 20.0 - 43.0 mg/dL Final     Total Protein   Date Value Ref Range Status   01/15/2025 7.1 6.0 - 8.4 g/dL Final   12/17/2024 7.1 6.0 - 8.4 g/dL Final     Cholesterol   Date Value Ref Range Status   05/17/2023 162 120 - 199 mg/dL Final     Comment:     The National Cholesterol Education Program (NCEP) has set the  following guidelines (reference ranges) for Cholesterol:  Optimal.....................<200 mg/dL  Borderline High.............200-239 mg/dL  High........................> or = 240 mg/dL     02/14/2022 144 120 - 199 mg/dL Final     Comment:     The National Cholesterol Education Program (NCEP) has set the  following guidelines (reference ranges) for Cholesterol:  Optimal.....................<200 mg/dL  Borderline High.............200-239 mg/dL  High........................> or = 240 mg/dL       Hemoglobin A1C   Date Value Ref Range Status   07/19/2024  "6.5 (H) 4.5 - 6.2 % Final     Comment:     According to ADA guidelines, hemoglobin A1C <7.0% represents  optimal control in non-pregnant diabetic patients.  Different  metrics may apply to specific populations.   Standards of Medical Care in Diabetes - 2016.    For the purpose of screening for the presence of diabetes:  <5.7%     Consistent with the absence of diabetes  5.7-6.4%  Consistent with increasing risk for diabetes   (prediabetes)  >or=6.5%  Consistent with diabetes    Currently no consensus exists for use of hemoglobin A1C  for diagnosis of diabetes for children.     06/29/2024 6.8 (H) 4.5 - 6.2 % Final     Comment:     According to ADA guidelines, hemoglobin A1C <7.0% represents  optimal control in non-pregnant diabetic patients.  Different  metrics may apply to specific populations.   Standards of Medical Care in Diabetes - 2016.    For the purpose of screening for the presence of diabetes:  <5.7%     Consistent with the absence of diabetes  5.7-6.4%  Consistent with increasing risk for diabetes   (prediabetes)  >or=6.5%  Consistent with diabetes    Currently no consensus exists for use of hemoglobin A1C  for diagnosis of diabetes for children.       Hemoglobin   Date Value Ref Range Status   01/15/2025 12.5 (L) 14.0 - 18.0 g/dL Final   12/17/2024 12.6 (L) 14.0 - 18.0 g/dL Final     POC Hematocrit   Date Value Ref Range Status   11/09/2022 25 (L) 36 - 54 %PCV Final   11/09/2022 24 (L) 36 - 54 %PCV Final     Hematocrit   Date Value Ref Range Status   01/15/2025 37.5 (L) 40.0 - 54.0 % Final   12/17/2024 38.1 (L) 40.0 - 54.0 % Final     Iron   Date Value Ref Range Status   01/15/2025 36 (L) 45 - 160 ug/dL Final   12/17/2024 65 45 - 160 ug/dL Final     No components found for: "FROLATE"  Vit D, 25-Hydroxy   Date Value Ref Range Status   05/13/2024 78 30 - 96 ng/mL Final     Comment:     Vitamin D deficiency.........<10 ng/mL                              Vitamin D insufficiency......10-29 ng/mL       Vitamin " D sufficiency........> or equal to 30 ng/mL  Vitamin D toxicity............>100 ng/mL     05/17/2023 40 30 - 96 ng/mL Final     Comment:     Vitamin D deficiency.........<10 ng/mL                              Vitamin D insufficiency......10-29 ng/mL       Vitamin D sufficiency........> or equal to 30 ng/mL  Vitamin D toxicity............>100 ng/mL       WBC   Date Value Ref Range Status   01/15/2025 6.16 3.90 - 12.70 K/uL Final   12/17/2024 5.41 3.90 - 12.70 K/uL Final       Assessment:     Nutrition/Diet History     Patient Reported Diet/Restrictions/Preferences: NPO  Food Allergies: NKFA  Factors Affecting Nutritional Intake: s/p laryngectomy, glossectomy     Estimated/Assessed Needs     Weight Used For Calorie Calculations: 67 kg (137 lb)  Energy Calorie Requirements (kcal): 4974-0687 kcal/day   Energy Need Method: 30-35 Kcal/kg  Protein Requirements:  g/day   Protein Need Method: 1.2-1.5 g/kg  Fluid Requirements: 2010 ml/day  Estimated Fluid Requirement Method: 1ml/kcal      Nutrition Support  Current EN order: Jevity 1.5- 6 cartons per day with 60ml FWF before and after each bolus. Provides 2130 calories, 90g protein and 1800ml FW. Recommend extra 60ml FWF TID to meet estimated fluid needs.     Evaluation of Received Nutrient/Fluid Intake     Calorie Intake: meeting needs  Protein Intake: meeting needs  Fluid Intake: meeting needs  Tolerance: tolerating  % Intake of Estimated Energy Needs: 100 % via peg        Nutrition Diagnosis Related to (Etiology) As Evidenced By (Signs/Symptoms)   Unintended weight loss Physiological causes increasing nutrient needs BoT cancer, Estimated intake of protein insufficient to meet requirements      RD Notes  Mr. Cyrus Batres is a 73y/o male with 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. Pt is schedule for glossectomy surgery for his base of tongue cancer in 1 month. He is currently NPO and recently switch to Jevity 1.5 and is tolerating  6 cartons per day without associated N/V/D, gas or bloating. He does have chronic diarrhea not associated with feeding times. He present today with questions regarding how he can increase his nutrition to prepare for surgery next month. He reports he would like to switch to Faveeo to promote regular BMs and help control BG. He reports recently weight gain of 7-8# since increasing his TF to 6 cartons per day. CW: 137#     1/23/22: RD met with Mr Batres infusion today for nutrition follow up. Pt will be starting new treatment with cisplatin and concurrent XRT s/p extensive resection last month. Pt denies having any current problems with peg feedings. He has good knowledge of nutrition related side effects of treatment. Denies N/V/D/C. BMs normal. CW: 128#     1/30: Pt reports he continues to bolus 5 cartons of Jevity per day without s/s of intolerance. He is working himself up to 6 cartons per day due to increased needs during CCXRT. He denies N/V/D/C. He reports good hydration via peg. Noted 43 weight loss in 1 week but pt reports he was wearing a heavy coat last week when his weight was taken. CW: 124#     2/6: Pt reports good tolerance to treatment so far. Denies any N/V/D/C, gas or bloating with EN. Continues to work himself up to 6 cartons per day with extra fluids via peg. He denies having any nutrition related questions or concerns at the current time. CW: 125#     2/13: RD met with Mr Batres for nutrition follow up today. Pt is starting cycle 3 of treatment today and denies any N/V/D/C, gas, bloating. He denies having any problems with his peg. He reports he was cleared by ST and his surgeon  to take sips of liquids including smoothies. He denies having any nutrition related questions or concerns at the current time. CW: 129#     2/27: Pt reports good tolerance to his treatment but has been having some nausea over the last week. He continues to use his peg for majority of intake but has started sipping on  juice and thin smoothies with ST. He denies having any nutrition related questions or concerns at the current time. CW: 126#     3/6: Pt reports continued nausea/upset stomach but otherwise is tolerating treatment well. This is his last week of CCXRT. He is tolerating his EN well and has been trying to increase intake of smoothies po with help of ST. He denies having any nutrition related questions or concerns at the current time. CW: 129#     1/3/23: Pt reports good tolerance to 6 cartons of Jevity via peg. He reports he has changes insurance and his current infusion company does not accept his new insurance. Pt is requesting a referral to a new infusion company today. CW: 145#    1/24/25: Patient reports good TF tolerance with Jevity 1.5, 5 cartons daily providing 1778kcal, 76gm protein, unable to tolerate 6 cartons d/t causing diarrhea and he c/o of still being hungry everyday. He takes 22oz water per feed and has 1-2 regular BM daily. Patient also noted that he needs more calcium. He also feels that he is constantly feeding himself which is every 3 hours. Patient has changed insurance recently and RD confirmed with his [facility] that he is still covered and able to receive TF through them. We discussed the option switching formula to TwoCalHN or adding more fiber to his TF regimen. Patient decided to try changing the formula first. CW: 147#     Nutrition Intervention:      Nutrition Intervention Enteral Nutrition  Composition   Goals/Expected Outcomes Change TF to TwoCalHN 5 cartons daily   Progress New      Plan  Change tube feeding to TwoCalHN, 5 cartons daily with 60ml FWF before and after each bolus. Provides 2370 calories, 99g protein and 1430ml FW. Referral placed to [facility] for tube feeding formula change.  Continue aggressive hydration via peg to prevent dehydration.  Provided RD contact info. Encouraged pt to contact RD with questions or concerns.     Monitoring/Evaluation:       Monitor: TF tolerance, weight, BMs     Next Visit: f/u as needed      I have explained and the patient understands all of  the current recommendation(s). I have answered all of their questions to the best of my ability and to their complete satisfaction.   The patient is to continue with the current management plan.     Electronically signed by: Magda Akins MS, RDN, LDN, CDCES

## 2025-01-24 NOTE — PROGRESS NOTES
[facility] Hematology/Oncology  PROGRESS NOTE -  Follow-up Visit      Subjective:       Patient ID:   NAME: Cyrus Batres Jr. : 1950     74 y.o. 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  The patient is here today to go over the results of the recently ordered labs, tests and studies. He is here by himself.    He previously had had completed chemotherapy and  XRT.      He saw Dr. James on 2025 and had some botox injections to help parotid secretions and has a follow up with him in 2025    He saw Dr Patterson on 2024; Dena Silveira NP with endocrine in 2024    He repeat CT scan on 2024 that showed no evidence of disease.     He met with cardiology to discuss cardiac calcifications.     He is doing well and is active at home.      He is breathing ok. No HA's or CP; no pain at this time    He does have some new right nipple pain, and had a mammogram on 2024     He is concerned about trying to get rid of the nipple tenderness, we did order a hormone panel.   He states that today he does not have any right nipple tenderness.       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 He had his portacath removed on  and replaced on 2023 by Dr Le     Discussed covid19 and he has been vaccinated      ROS:   GEN: normal without any fever, night sweats or  weight loss; doing well with tube feeds   HEENT: normal with no HA's, sore throat, stiff neck, changes in vision  CV: normal with no CP, SOB, PND, no current GRIFFIN  PULM: normal with no SOB, cough, hemoptysis, sputum or pleuritic pain  GI: no current N/V  ; has peg tube;    : normal with no hematuria, dysuria  BREAST: normal with no mass, discharge, pain + new right nipple pain - gynecomastia  SKIN: normal with no rash, erythema, bruising, or swelling     Review of Systems   Respiratory:  Negative for cough and shortness of breath.    Cardiovascular:  Negative for chest pain.   Gastrointestinal:  Negative for abdominal pain and diarrhea.   Genitourinary:  Negative for frequency.   Musculoskeletal:  Negative for back pain.   Skin:  Negative for rash.   Neurological:  Negative for headaches.   Psychiatric/Behavioral:  The patient is not nervous/anxious.        Pain Scale:  0    Allergies:  Review of patient's allergies indicates:   Allergen Reactions    Lovastatin Itching    Pollen extracts     Lovastatin Rash     Not confirmed but pt skeptical       Medications:    Current Outpatient Medications:     acetaminophen (TYLENOL) 325 MG tablet, Take 325 mg by mouth every 6 (six) hours as needed for Pain., Disp: , Rfl:     calcium carbonate 500 mg/5 mL (1,250 mg/5 mL), Take 20 mls by mouth four times daily with meals and nightly., Disp: 2300 mL, Rfl: 12    diclofenac sodium (VOLTAREN ARTHRITIS PAIN) 1 % Gel, Apply 2 g topically once daily. Apply couple of times a day on the affected arthritis pain., Disp: 100 g, Rfl: 1    esomeprazole (NEXIUM) 40 MG capsule, 40 mg before breakfast., Disp: , Rfl:     levothyroxine (SYNTHROID) 100 MCG tablet, Take 1 tablet (100 mcg total) by mouth before breakfast., Disp: 90 tablet, Rfl: 3    promethazine-dextromethorphan (PROMETHAZINE-DM) 6.25-15 mg/5 mL Syrp, Take 5 mLs by mouth every 12 (twelve) hours as needed (cough and cogestion)., Disp: 300 mL, Rfl: 5    levothyroxine (SYNTHROID) 125  "MCG tablet, 1 tablet (125 mcg total) by Per G Tube route before breakfast., Disp: 90 tablet, Rfl: 3    PMHx/PSHx Updates:  See patient's last visit with Dr. Tamez on 8/20/2024  See H&P on 9/23/2022        Pathology:   Cancer Staging   Larynx cancer  Staging form: Larynx - Glottis, AJCC 8th Edition  - Clinical stage from 8/6/2020: Stage II (cT2, cN0, cM0) - Signed by Fariha Muñoz NP on 8/6/2020  - Pathologic stage from 1/20/2022: Stage LOU (pT4a, pN0, cM0) - Signed by Fariha Muñoz NP on 1/20/2022  Staging form: Pharynx - P16 Negative Oropharynx, AJCC 8th Edition  - Clinical: Stage II (rcT2, cN0, cM0) - Signed by Matheus Portillo Jr., MD on 5/30/2022    Malignant neoplasm of base of tongue  Staging form: Pharynx - P16 Negative Oropharynx, AJCC 8th Edition  - Clinical stage from 5/20/2022: Stage II (cT2, cN0, cM0, p16-) - Signed by Saúl Kessler MD on 7/7/2022    Dissection 11/9/2022:    Final Pathologic Diagnosis 1. "left submandibular lymph node", dissection:       - Three lymph nodes, negative for carcinoma (0/3)   2. 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       - Tumor size: 4.5 cm       - Resection margins: left superior pharyngeal margin focally involved;   all other margins are negative for carcinoma       - Left internal jugular vein: free of tumor       - One lymph node, negative for carcinoma (0/1)   Note: P16 immunostain is negative     Tumor Site       Oropharynx  involving Base of tongue       Tumor Laterality       Midline       Tumor Size       Greatest Dimension (Centimeters) 4.5 cm         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)       Histologic Grade       G2 to G3: Moderate to Poorly differentiated       Lymphovascular Invasion       Not identified       Perineural Invasion       Not identified     MARGINS      Margins       Involved by invasive tumor   "   Margin(s)   Involved by Invasive Tumor:      left superior pharyngeal margin; all other   margins are free of tumor     LYMPH NODES    Regional Lymph Node Status:    :     Regional Lymph Node   Status:      All regional lymph nodes negative for tumor      pT Category:               pT3   pN Category:               pN0      Base of Tongue Biopsy  4/27/2022:  Final Pathologic Diagnosis BIOPSY OF BASE OF THE TONGUE:   THE INFILTRATING POORLY DIFFERENTIATED SQUAMOUS CELL CARCINOMA   THE TUMOR IS P16 NEGATIVE.  THE POSITIVE AND NEGATIVE CONTROLS STAINED   APPROPRIATELY      Larynx resection/thyroidectomy 1/6/2022:     Final Pathologic Diagnosis 1. Lymph nodes, left neck levels 2,  3 and 4, dissection:   - Nineteen lymph nodes, all  negative  for metastatic carcinoma (0/19)   2. Lymph nodes, right neck levels 2,  3 and 4, dissection:   - Twenty-eight lymph nodes, all  negative  for metastatic carcinoma (0/28)   3. Possible parathyroid gland, excision:   - Benign parathyroid gland tissue (0.003 g)   4. 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 Soft tissue, posterior tracheal margin, excision:   - Benign, focally reactive respiratory mucosa with submucosal acute   inflammation,  negative  for dysplasia or malignancy   6. Soft tissue, anterior tracheal margin, excision:   - Benign respiratory mucosa with subepithelial cartilage,  negative  for   dysplasia or malignancy   7. Soft tissue, posterior tracheal margin #2, excision:   - Benign, focally reactive  "respiratory mucosa with submucosal acute   inflammation,  negative  for dysplasia or malignancy   8. Soft tissue, anterior tracheal margin #2, excision:   - Benign, reactive focally ulcerated respiratory mucosa with submucosal acute   inflammation,  negative  for dysplasia or malignancy             Laryngoscope 8/4/2020: (with Dr Noel):  Final Pathologic Diagnosis 1.  Left true vocal fold, biopsy:       -  Invasive moderately differentiated squamous cell carcinoma,   keratinizing type   2.  Left true vocal fold, biopsy:       -  Invasive moderately differentiated squamous cell carcinoma,   keratinizing type             Laryngoscope 3/17/2020 (with Dr Xiao):  Final Pathologic Diagnosis 1.  BIOPSY OF LEFT TRUE VOCAL CORD:   SEVERELY DYSPLASTIC APPEARING SQUAMOUS MUCOSA   INVASIVE CARCINOMA IS NOT DOCUMENTED   ON THE OTHER HAND, THESE FRAGMENTS ARE NODUL AND WITHOUT SUBMUCOSA FOR   EVALUATION; IT IS POSSIBLE THAT THEY REFLECT INVASIVE SQUAMOUS CARCINOMA   2.  BIOPSY OF LEFT ANTERIOR COMMISSURE:   MODERATE DYSPLASIA   NO INVASIVE CARCINOMA IDENTIFIED             Objective:     Vitals:  Blood pressure 115/66, pulse 63, temperature 97.2 °F (36.2 °C), temperature source Temporal, resp. rate 17, height 5' 6" (1.676 m), weight 66.7 kg (147 lb).    Physical Examination:   GEN: no apparent distress, comfortable; AAOx3  HEAD: atraumatic and normocephalic  EYES: no pallor, no icterus, PERRLA  ENT: OMM, no pharyngeal erythema, external ears WNL; no nasal discharge; no thrush; s/p laryngectomy with flap since healed well; trach   NECK: no masses, thyroid normal, trachea midline, no LAD/LN's, supple; post-op dissection healed well;    CV: RRR with no murmur; normal pulse; normal S1 and S2; no pedal edema; portacath   CHEST: Normal respiratory effort; CTAB; normal breath sounds; no wheeze or crackles  ABDOM: nontender and nondistended; soft; normal bowel sounds; no rebound/guarding; peg tube  MUSC/Skeletal: ROM normal; no " crepitus; joints normal; no deformities or arthropathy  EXTREM: no clubbing, cyanosis, inflammation or swelling  SKIN: no rashes, lesions, ulcers, petechiae or subcutaneous nodules  : no mahan  NEURO: grossly intact; motor/sensory WNL; AAOx3; no tremors  PSYCH: normal mood, affect and behavior  LYMPH: normal cervical, supraclavicular, axillary and groin LN's    Breast: - right nipple fullness and different than left gynecomastia            Labs:     Lab Results   Component Value Date    WBC 6.16 01/15/2025    HGB 12.5 (L) 01/15/2025    HCT 37.5 (L) 01/15/2025    MCV 92 01/15/2025     (L) 01/15/2025     CMP  Sodium   Date Value Ref Range Status   01/15/2025 133 (L) 136 - 145 mmol/L Final     Potassium   Date Value Ref Range Status   01/15/2025 3.8 3.5 - 5.1 mmol/L Final     Chloride   Date Value Ref Range Status   01/15/2025 98 95 - 110 mmol/L Final     CO2   Date Value Ref Range Status   01/15/2025 29 23 - 29 mmol/L Final     Glucose   Date Value Ref Range Status   01/15/2025 129 (H) 70 - 110 mg/dL Final     BUN   Date Value Ref Range Status   01/15/2025 29 (H) 8 - 23 mg/dL Final     Creatinine   Date Value Ref Range Status   01/15/2025 1.1 0.5 - 1.4 mg/dL Final     Calcium   Date Value Ref Range Status   01/15/2025 8.8 8.7 - 10.5 mg/dL Final     Total Protein   Date Value Ref Range Status   01/15/2025 7.1 6.0 - 8.4 g/dL Final     Albumin   Date Value Ref Range Status   01/15/2025 4.3 3.5 - 5.2 g/dL Final   11/18/2020 3.7 3.6 - 5.1 g/dL Final     Comment:     For additional information, please refer to   http://education.[employer]/faq/CYC926 (This link is   being provided for informational/ educational purposes only.)  This test was developed and its analytical performance   characteristics have been determined by [facility]. It has not been cleared or approved by the   [place] Food and Drug Administration. This assay has been validated   pursuant to the CLIA  regulations and is used for clinical   purposes.  @ Test Performed By:  [email or web address] [facility]  Yo Cortes M.D.,   [address] [place] [address]  [facility]  [id number]       Total Bilirubin   Date Value Ref Range Status   01/15/2025 1.1 (H) 0.1 - 1.0 mg/dL Final     Comment:     For infants and newborns, interpretation of results should be based  on gestational age, weight and in agreement with clinical  observations.    Premature Infant recommended reference ranges:  Up to 24 hours.............<8.0 mg/dL  Up to 48 hours............<12.0 mg/dL  3-5 days..................<15.0 mg/dL  6-29 days.................<15.0 mg/dL       Alkaline Phosphatase   Date Value Ref Range Status   01/15/2025 156 (H) 55 - 135 U/L Final     AST   Date Value Ref Range Status   01/15/2025 34 10 - 40 U/L Final     ALT   Date Value Ref Range Status   01/15/2025 36 10 - 44 U/L Final     Anion Gap   Date Value Ref Range Status   01/15/2025 6 (L) 8 - 16 mmol/L Final     eGFR if    Date Value Ref Range Status   07/29/2022 >60.0 >60 mL/min/1.73 m^2 Final     eGFR if non    Date Value Ref Range Status   07/29/2022 >60.0 >60 mL/min/1.73 m^2 Final     Comment:     Calculation used to obtain the estimated glomerular filtration  rate (eGFR) is the CKD-EPI equation.                   Radiology/Diagnostic Studies:    Mammogram 12/20/2024:   FINDINGS:  Bilateral retroglandular fibroglandular tissue with interspersed fatty elements, asymmetrically greater on the right.  No discrete mass, architectural distortion, or suspicious microcalcifications.     Impression:     Bilateral gynecomastia, asymmetrically greater on the right.     BI-RADS CATEGORY 2: BENIGN FINDING.     Recommendation: Clinical follow-up.        Electronically signed by:Jose De Jesus Oleary  Date:                                            12/20/2024  Time:                                           16:09        Exam  Ended: 12/20/24 15:41 CST         US abdomen: 9/20/2024  Impression:     Negative ultrasound of the liver     No evidence bowel stent        Electronically signed by:Shira Menendez  Date:                                            09/20/2024  Time:                                           08:42      8/28/2024: CT neck     Impression:     Extensive postsurgical changes within the soft tissues of the neck without evidence of local recurrence of malignancy.     No evidence of metastatic disease.     Multiple incidental findings as noted above.  Multifocal narrowing of the intracranial segment of the right vertebral artery and extensive coronary arterial calcification is again observed.        Electronically signed by:Jesika Atkins  Date:                                            08/28/2024  Time:                                           14:01        Exam Ended: 08/28/24 13:31 CDT       PET 2/7/2024:  IMPRESSION:     Extensive postoperative changes of the neck as outlined above, stable compared to prior PET/CT.  No convincing evidence of residual/recurrent FDG avid malignancy.     Mild FDG avidity of the proximal thoracic esophagus near the operative site, decreased compared to prior.     FDG avidity of arthritic right sternoclavicular joint          Ct Chest/Neck 9/21/2023:    IMPRESSION:     1. Extensive postsurgical changes throughout the neck as described, with no evidence of residual or recurrent malignancy.  2. Negative for metastatic disease throughout the neck or the chest.  3. 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  4. Coronary artery calcifications.        PET 5/30/2023:    IMPRESSION: Postsurgical changes from total glossectomy, laryngectomy and pharyngectomy with bilateral neck dissections     There is resolution of the previously noted fluid collections within the neck. There is mild FDG activity in the left side the neck adjacent to the neoesophagus as well as at the tracheostomy  site to the right of the neoesophagus. Findings most likely are secondary to postsurgical and postinfection changes. There is no focal mass or adenopathy     No evidence of distant metastasis             CTA Neck    Result Date: 9/20/2022  EXAMINATION: CTA NECK CLINICAL HISTORY: Head/neck cancer, monitor;Malignant neoplasm of base of tongue TECHNIQUE: CT angiogram was performed from the level of the scott to the EAC following the IV administration of 75mL of Omnipaque 350.   Sagittal and coronal reconstructions and maximum intensity projection reconstructions were performed. Arterial stenosis percentages are based on NASCET measurement criteria. COMPARISON: CTA neck dated 05/20/2022 FINDINGS: Aortic arch and great vessels: There is a conventional left-sided 3 vessel arch.  The aortic arch is widely patent.  There is stable soft and calcified plaque in the proximal left subclavian artery with a similar appearance the prior study and possibly within radiation field but without critical stenosis or occlusion.  The right subclavian artery is patent.  The brachiocephalic trunk and origin of the left common carotid artery are patent. Carotid arteries Right carotid artery: There is calcified plaque scattered in the right common carotid artery without critical stenosis, occlusion or change.  There is no measurable stenosis of the internal carotid artery which is patent to the skull base. Left carotid artery: There is mild plaque scattered in the left common carotid artery without change and without critical stenosis or occlusion.  There is no measurable stenosis of the internal carotid artery. Vertebral arteries: The left vertebral artery is dominant and patent throughout its course in the neck.  The right vertebral artery is hypoplastic but patent.  There is no critical stenosis, occlusion, thrombus or dissection.  There is no change. The study extends intracranially.  There is calcified plaque in the V4 segment of the  left vertebral artery resulting in a moderate to marked short segment nonocclusive stenosis.  The right vertebral artery partially terminates in a posteroinferior cerebellar artery with a short segment moderate to marked stenosis of the very distal right vertebral artery.  Some flow within the distal right vertebral artery may represent retrograde flow from the dominant left vertebral artery.  The basilar artery is patent.  The origins of the superior cerebellar and posterior cerebral artery on the right are patent.  There is fetal origin of the left posterior cerebral artery which is patent. There is plaque in the cavernous segments of both internal carotid arteries but there is no critical stenosis or large vessel occlusion of the anterior circulation vessels.  There is no large aneurysm. Other: There is a similar appearance of the included upper lungs with pleural and parenchymal changes anteriorly in the upper lobes bilaterally.  As previously discussed, timing of the contrast bolus is performed during the arterial phase for CTA neck.  Therefore, enhancement of the soft tissues is somewhat suboptimal due to this technique. There has been a large region of soft tissue resection in the anterior mid and lower neck soft tissues with continued open defect in the upper chest and lower neck above the manubrium.  Soft tissue seen along the left posterolateral border of the trachea could represent secretions or mucus.  This was present previously. Redemonstrated is a large heterogeneously enhancing lesion centered at the level of the tongue base and floor of the mouth centrally into the left.  There is scattered calcifications.  The prior CTA demonstrated regions of central necrosis which are no longer definitely present but diffusely heterogeneous density and enhancement likely represents regions of necrosis.  This large heterogeneously enhancing soft tissue mass extends more anteriorly in the floor of the mouth on the  left and measures at least 5.6 cm in greatest anterior to posterior dimension with 3.6 cm transverse dimension.  This does extend anteriorly to the medial margin of the body of the mandible on the left. There are post treatment changes with stranding in the neck fat.     1. Similar appearance of the neck vessels when compared to the prior CTA neck with mild narrowing at the origin of the left subclavian artery.  There is no critical stenosis, occlusion, thrombosis or dissection involving the cervical vertebral or carotid arteries. 2. Larger mass within the hypopharynx and tongue base extending anteriorly to the floor of the mouth on the left to the medial margin of the body of the mandible without obvious bony destruction. 3. There is nonocclusive stenosis of the distal V4 segment of the left vertebral artery without change. 4. There is no large vessel occlusion or critical stenosis of the anterior circulation. 5. There is developmental variation with fetal origin of the left posterior cerebral artery. Electronically signed by: Rex Joyner MD Date:    09/20/2022 Time:    11:31    CT Abdomen Pelvis With Contrast    Result Date: 9/1/2022  CMS MANDATED QUALITY DATA - CT RADIATION - 436 All CT scans at this facility utilize dose modulation, iterative reconstruction, and/or weight based dosing when appropriate to reduce radiation dose to as low as reasonably achievable. Reason: abdominal pain partial history of laryngeal carcinoma TECHNIQUE: CT abdomen and pelvis with 100 mL Omnipaque 350. COMPARISON: PET/CT 5/13/2022 CT ABDOMEN: Coronary artery calcification and extremely tiny hiatal hernia incidentally noted. Visualized lung bases are clear. Liver, gallbladder, pancreas, spleen, adrenals, and kidneys are normal. Mild aortoiliac calcifications present. Gastrostomy tube tip lies in distal gastric body. Small intestines are unremarkable. Mild wall thickening affects the ascending colon with pneumatosis intestinalis  diffusely affecting the ascending colon. No other free intraperitoneal gas or, and remainder of colon is normal. A normal appendix is present. The major mesenteric vascular structures are patent. No acute osseous abnormality. CT PELVIS: Prostate is slightly enlarged. Bladder is normal. No free pelvic fluid. Tiny right and small to moderate left fat-containing inguinal hernias are present. No acute osseous abnormality. IMPRESSION: 1. Pneumatosis intestinalis affecting the ascending colon with associated mild wall thickening. Etiology of this is undetermined. Potential considerations include intestinal ischemia or pneumatosis related to chemotherapy. Clinical and laboratory correlation is needed to assess significance. No portal venous gas or free intraperitoneal air however. 2. Coronary artery calcifications Electronically signed by:  Nael Hui MD  9/1/2022 6:20 PM CDT Workstation: [id number]    NM PET CT Routine Skull to Mid Thigh    Result Date: 9/27/2022  PET CT WITH IMAGE FUSION HISTORY:  restage tongue cancer RESTAGING...  HX: TONGUE CA.  DX: April 2022.  LAST CHEMO TREATMENT WAS 3WKS AGO.  EVALUATE TX RESPONSE.   ALSO HX OF LARYNGEAL CA IN AUGUST 2020.  MULTIPLE SURGERIES: LARYNGECTOMY, THYROIDECTOMY & TRACHEOSTOMY.  RAD TX WAS COMPLETED IN NOV 2020. TECHNIQUE: Following IV administration of 11.2 mCi of F-18 labeled FDG into right antecubital fossa and a 60 minute delay, PET CT was performed from the vertex of the skull through the proximal thighs with an integrated PET CT scanner with image fusion. CT images were obtained to aid in attenuation correction and PET localization. The patient's serum glucose at the time of the exam was 136 mg/dL. COMPARISON: PET/CT 5/13/2022 FINDINGS: Poorly characterized soft tissue mass involving base of tongue approximately measures 4.3 x 2.8 cm (series 3 image 65) appearing similar to the prior exam. This reaches current max SUV of 11.8, not significantly changed from prior  of 11.4. No other abnormal FDG activity. Intracranial compartment is unremarkable. Trace bilateral maxillary sinus mucosal thickening is present. Postoperative changes of laryngectomy and tracheostomy are present. Surgical clips occur throughout the neck. No definite enlarged cervical or supraclavicular lymph node, with evaluation limited by lack of IV contrast. Left subclavian port catheter tip terminates in SVC. Diffuse coronary artery calcifications are present. No enlarged mediastinal or axillary lymph nodes. No pulmonary nodule or mass. Gastrostomy tube tip lies in gastric body. Moderate aortoiliac calcifications are present. Small fat-containing left inguinal hernia incidentally noted. Mild degenerative changes affect the spine. No suspicious osseous abnormality. IMPRESSION: 1. No significant change in the FDG avid mass involving the tongue base, remaining characteristic of malignancy. 2. No new FDG avid malignancy or metastatic disease. Electronically signed by:  Nael Hui MD  9/27/2022 2:38 PM CDT Workstation: [id number]    CT Abdomen Pelvis  Without Contrast    Result Date: 9/4/2022  CMS MANDATED QUALITY DATA - CT RADIATION  436 All CT scans at this facility utilize dose modulation, iterative reconstruction, and/or weight based dosing when appropriate to reduce radiation dose to as low as reasonably achievable. CT ABDOMEN PELVIS WITHOUT IV CONTRAST CLINICAL HISTORY: 72 years Male Follow-up pneumatosis COMPARISON: CT abdomen and pelvis September 1, 2022 FINDINGS: Imaging through the lower thorax demonstrates subsegmental atelectasis of the dependent lower lobes. Coronary artery calcification. Bone window images show no acute or aggressive osseous abnormality. Transitional lumbosacral vertebral body. On this unenhanced exam, no focal hepatic lesion. Gallbladder and biliary tree are unremarkable. Spleen appears normal. Pancreas is unremarkable. No adrenal lesion. No renal calculi or hydronephrosis. Ureters  are normal in caliber. Urinary bladder is within normal limits. Gastrostomy tube is in place within the stomach. Stomach is largely collapsed. No evidence of small bowel obstruction. Pneumatosis and pericolonic abscess involving the ascending colon is redemonstrated, slightly diminished compared to prior. Mild wall thickening throughout the colon. No free fluid or free air within the abdomen or pelvis. Aortoiliac atherosclerotic calcification. No pathologically enlarged lymph nodes within the abdomen or pelvis. Bilateral fat-containing inguinal hernias, larger on the left. IMPRESSION: Pneumatosis and pericolonic gas about the ascending colon has decreased in volume compared to prior. Persistent colonic wall thickening which could indicate colitis. Coronary and aortic atherosclerosis. Gastrostomy tube is within the stomach. Bilateral inguinal hernias. Electronically signed by:  Jose De Jesus Oleary MD  9/4/2022 4:06 PM CDT Workstation: [id number]    CTA neck  5/20/2022:     IMPRESSION:  Persistent mass within the hypopharynx consistent with malignancy.  By my measurements it is larger than on April 24, 2022 with central necrosis.  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 This is similar to the previous April 2022 study.  No evidence of arterial compromise related to malignancy.     PET 5/13/2022:     IMPRESSION:  1. Postoperative changes of prior laryngectomy and lymph node resection/radiation.  2. Masslike FDG avid lesion to the left of midline at the tongue base concerning for residual/recurrent disease.  3. Adjacent confluent nodular extension along the inferior left side of the mass.  4. No FDG avid lymphadenopathy or convincing additional sites of disease in the chest, abdomen or pelvis.     CT head 5/10/2022:  FINDINGS: Comparison to multiple prior exams. There is no acute intracranial hemorrhage, with no mass effect or abnormal extra-axial fluid. Mild  scattered areas of nonspecific hypoattenuation involve the deep periventricular white matter, with gray-white differentiation maintained.     There is mild generalized prominence of the cortical sulci and ventricles. The cerebellum and brainstem are unremarkable. There are carotid siphon vascular calcifications. The visualized paranasal sinuses and mastoid air cells are clear. There is no acute calvarial fracture or scalp hematoma.     IMPRESSION: No acute intracranial hemorrhage or acute calvarial fracture     CT soft neck 4/24/2022;     Oral tongue/tongue base:  At the base of the tongue on the left there are findings of a heterogeneously enhancing mass measuring 2.1 cm highly concerning for a neoplastic process.     True and false cords:  Patient status post laryngectomy which has been performed in the interim. Soft tissue stranding in the lower neck likely related to a previous flat reconstruction. No enhancement or lymphadenopathy within the lower neck.     Lymph node assessment:Negative     Surrounding soft tissues:  Negative     Vasculature:  Negative     Osseous structures:  Negative     Lung apices:  Scarring involving the lung apices bilaterally likely related to previous radiation.     IMPRESSION:  1. Postoperative and radiation changes involving the lower neck.  2. Findings highly concerning for a developing mass at the left base of the tongue enhancing 2.1 cm region. Direct visualization in this region would be of benefit.        CT soft neck  12/24/2021  IMPRESSION:  1.  Laryngeal mass as on prior exam. Laryngeal airway narrowing has not changed.  2.  No evidence for active hemorrhage.  3.  Partially visualized patchy groundglass infiltrates in both upper lobes. Also see CT chest report     CTA Chest 12/24/2021:  IMPRESSION:     1.  No pulmonary embolism.     2.  Soft tissue stranding in the region of the strap musculature with ill-defined hypodensity measuring 2.8 x 1.4 cm in the cervical midline.   Findings concerning for infectious process or abscess. Neoplastic process could have similar imaging characteristics.     3.  Suggested erosion of the proximal right parasymphyseal aspect of the thyroid shield.  Correlated with CT soft tissue neck findings. Findings could represent osteomyelitis. Neoplastic process cannot be excluded.     4.  Hepatic steatosis.  5.  Thickening of the gastric wall. Prominence of perigastric vasculature. Small hiatal hernia.     6.  Moderate stool burden.  Correlate for constipation.        PET 12/15/2021:  IMPRESSION:     Substantial qualitative and quantitative increase of hypermetabolic FDG activity associated with the larynx in this patient with known supraglottic neoplasm.     No evidence of FDG avid metastatic disease involving neck, chest, abdomen or pelvis.     PET 8/21/2020:     Impression:     1. Intensely hypermetabolic plaque-like mass along the left true vocal cord, compatible with known laryngeal carcinoma.  2. No findings of regional metastatic disease in the neck, or distant metastatic disease.        CT Chest 8/10/2020:     IMPRESSION:     No metastatic disease within the chest.  Three-vessel coronary artery calcification. Nondilated cardiac  chambers     CT Soft neck 7/22/2020:  IMPRESSION:  1. Slight interval increase in size of the previously described  enhancing nodule along the anterior left vocal cord.  2. No pathologic lymphadenopathy.  3. Additional and incidental findings as noted above.     CT Soft neck  3/16/2020:     Impression:     6 mm enhancing focus involving the superior aspect of the left focal cord near the anterior commisure.  Recommend direct visualization for further evaluation of laryngeal polyp     Question of 2 mm submucosal lipoma involving the midportion of the superior aspect of the left vocal cord.     Mild arteriosclerosis involving the brachiocephalic arteries and carotid arteries     Mild degenerative change of the cervical spine         I have reviewed all available lab results and radiology reports.    Assessment/Plan:   (1) 74 y.o. 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     - Patient has been under the care of Dr Khoobehi with [facility] and Dr Portillo with rad/onc.   - He was recently found to have a new primary cancer involving the base of his tongue in April 2022. He was deemed not to be a candidate for any further radiation and did not want to undergo any further surgery as this would necessitate a glossectomy procedure.   - He has been on adjuvant chemotherapy per direction of Dr Khoobehi and has had 3 cycles. His therapy course has been complicated with persistent diarrhea and N/V.      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 He saw Dr Lucero with Rad/onc this am. They are going to present his case to H&N Tumor Board at [facility] [place] and plans to see Dr James about surgical options. If he is not a surgical candidate then will proceed with cisplatin and XRT combine therapy.     10/6/2022:  - He saw Dr Lucero with Rad/onc, and Dr James and his case was presented to H&N Tumor Board at [facility] [place] and  he general consensus was to proceed to surgery.  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   - He had his portacath removed on 11/28.   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, keratinizing type, moderate to poorly differentiated tumor  - Four LN's were all negative  - he did have a positive left superior pharyngeal margin  - pT3 pN0  - reviewed the latest NCCN guidelines again from Version 1.2023; he may need some further systemic therapy due to the margin  - check on Rad/onc follow-up     1/23/2023:  - s/p new portacath placed on 1/12/2023 with Dr Le  - starting combined chemotherapy and XRT - cisplatin  - s/p chemotherapy school with [facility]    2/6/2023:  - he seems to be tolerating the chemotherapy well at this time  - continued with concomitant therapy with XRT    2/22/2023:  - he seems to be tolerating the chemotherapy well at this time  - continued with concomitant therapy with XRT  - labs relatively adequate  - will check on his refills    3/6/2023:  - finishing up XRT this comking Thursday  - last chemotherapy today    4/3/2023:  - He has since completed chemotherapy and  XRT.  He seems to have tolerated the regimen fairly well with no new complaints.  Some weight loss but reports he is staying hydrated. He does have some occasional GRIFFIN.   - He is seeing ENT tomorrow with Dr James    5/31/2023:  - He has since completed chemotherapy and  XRT.  He seems to have tolerated the regimen fairly well . He saw Dr James with ENT on 5/2/2023 and had repeat scope with good report per patient.  - He had recent PET yesterday on 5/30 which overall looks adequate    8/23/2023:  - sees Dr James again on 9/5/2023  - will set up repeat CT neck and chest for Sept 2023  - reach out to dietary about foods he can eat that are higher in iron  - consider IV iron x2  (he can not swallow pills)  - see if we can renew PT    11/15/2023:  - He previously had completed chemotherapy and  XRT.  He seems to have tolerated the regimen fairly well . He saw Dr James with ENT on 11/72023 and had repeat scope with good report per patient.  - He last saw Dr Patterson on 8/3/2023 and sees him again in Jan 2024, Dr Portillo on 6/19/2023  - He had last PET on 5/30  and CT Chest/Neck in Sept 2023  - he missed one IV iron  - due for up to date labs    2/20/2024:  - He saw Dr James with ENT 2/6/2024 with DL with good report per patient. He sees him again in 3 months  - He saw Dr Patterson on 2/5/2024  - He had PEt on 2/7/2024   - some residual thrombocytopenia post-chemotherapy - monitor for now as this may take some time to recover     5/20/2024:  - He saw Dr James with ENT 5/7/2024 with DL with good report per patient. He sees him again on 6/18/2024 for ear flush.   - He saw Dr Patterson on 5/10/2024; Dena Silveira NP with endocrine in April 2024  - he had tube exchange with Dr Cooper in April 2024  - still having some hypocalcemia issues  - he declined referral to podiatrist for his right ankle issues    8/20/2024:  - he recently saw Dr James and had repeat LS  - sees Dr James again in Sepot 2024  - will order f/u CT chest/neck  - mild anemia and mild thrombocytopenia - will continue to monitor    11/19/2024:   - CT scan reviewed with patient   - new right nipple pain   - mammogram and US     12/23/2024:  - mammogram reviewed with patient   - he is still having breast tenderness   - ordered labs to eval for Thyrotoxicosis   - needs a follow up with endocrine     1/24/2025:  - follow up for surveillance scopes with ENT in March 2025  - needs appt with endocrine for long standing thyroid issues and management along with testosterone issues   - needs to follow up and get cardiology testing, unable to do so in November due to loss in family   - no more nipple tenderness   - recheck labs in one month to  eval anemia - refuses colonscopy at this time   - referral to GI, for peg tube internal tenderness per patient.            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 He had a CT scan on 3/16/2020 which showed a 6mm nodule on the left vocal cord. A repeat Ct scan four months later on 7/22/2020 showed the nodule enlarged to 1.4 x 1.1 cm in size. Patient was then seen by Dr Noel and underwent repeat scope in Aug 2020 who found a bulky deeply invading tumor which was debulked and the pathology coming back moderately differentiated SCCA without lymphovascular or perineural invasion. Hs case was subsequently presented to the tumor board and the consensus was to proceed with radiation. He completed XRT on 10/30/2020 and proceeded with regular laryngoscopy surveillance. He eventually required salvage laryngectomy and neck dissection with flap with Dr James on Jan 6th 2022. 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 Fifty lymph nodes were removed which were all negative. Pathology TNM was pT4a, pN0. Patient did not require any adjuvant therapy afterwards.               VISIT DIAGNOSES:      Laryngeal cancer  -     CT Neck Chest With Contrast (XPD); Future; Expected date: 02/28/2025    Presence of externally removable percutaneous endoscopic gastrostomy (PEG) tube  -     Ambulatory referral/consult to Gastroenterology; Future; Expected date: 01/31/2025    Hypothyroidism due to medication  -     Ambulatory referral/consult to Endocrinology;  Future; Expected date: 01/31/2025    S/P laryngectomy    Abdominal pain, unspecified abdominal location  -     Ambulatory referral/consult to Gastroenterology; Future; Expected date: 01/31/2025        PLAN:  continue with ENT and rad/onc f/u as directed by them respectively - sees Dr James again in March, will reorder repeat scans for 6 month review  Continue to monitor labs - repeat cbc, iron panel and thyroid labs monthly    s/p chemotherapy school with me - discussed the side-effect profile, provided literature and obtained consents  F/u Rad/onc follow-up as directed  F/u with PCP, ENT, etc  Dietician f/u on the tube feeds   Needs to see cardiology about cardiac work up   Endocrine referral for an appt for thyroid management, on synthroid now and did just decrease dose to 100mcg   Follow up with GI about PEG tube tenderness, site looks clean and dry         Discussion:           COVID-19 Discussion:     I had long discussion with patient and any applicable family about the COVID-19 coronavirus epidemic and the recommended precautions with regard to cancer and/or hematology patients. I have re-iterated the CDC recommendations for adequate hand washing, use of hand -like products, and coughing into elbow, etc. In addition, especially for our patients who are on chemotherapy and/or our otherwise immunocompromised patients, I have recommended avoidance of crowds, including movie theaters, restaurants, churches, etc. I have recommended avoidance of any sick or symptomatic family members and/or friends. I have also recommended avoidance of any raw and unwashed food products, and general avoidance of food items that have not been prepared by themselves. The patient has been asked to call us immediately with any symptom developments, issues, questions or other general concerns.         Pathology Discussion:     I reviewed and discussed the pathology report(s) and radiograph reports (if available) in as simple  "to understand and/or laymen's terms to the best of my ability. I had an indepth conversation with the patient and went over the patient's individual diagnosis based on the information that was currently available. I discussed the TNM staging process with regard to the patient's particular cancer type, and the calculated stage based on the currently available TNM data and literature. I discussed the available prognostic data with regard to the current staging information and how it relates to the prognosis of their particular neoplastic process.          NCCN Guidelines:     I discussed the available treatment option(s) in accordance with the latest literature from the NCCN Clinical Practice Guidelines for the patient's particular type of cancer disorder. The NCCN Guidelines provide a "document evidence-based (and) consensus-driven management" of the care of oncology patients. The treatment recommendations were made not only in accordance to the NCCN guidelines, but also factored in to account the patient's overall age, condition, performance status and their medical co-morbidities. I went over the risks and benefits of the the treatment options (if any could be made) with regard to their particular cancer type, their cancer stage, their age, and their co-morbidities.         Chemotherapy Discussion:      I discussed the available treatment option(s) in accordance with the latest/current national evidence-based guidelines (NCCN, UpToDate, NCI, ASCO, etc where applicable), their overall age/condition and their co-morbidities. I also went over the risks and benefits of the chemotherapy with regard to their particular cancer type, their cancer stage, their age/condition, and their co-morbidities. I provided literature on the chemotherapy regimen and discussed the chemotherapy side-effect profiles of the drug(s). I discussed the importance of compliance with obtaining and monitoring weekly lab work, and went over the " potential hematopathology issues and risks with anemia, leucopenia and thrombocytopenia that can occur with chemotherapy. I discussed the potential risks of liver and kidney damage, which could be permanent and could necessitate dialysis long-term if kidney failure developed. I discussed the emetic and/or diarrheal potential of the regimen and the potential need for use of antiemetic and anti-diarrheal medications. I discussed the risk for development of anaphylactic shock, bronchospasm, dysrhythmia, and respiratory/cardiovascular arrest and/or failure. I discussed the potential risks for development of alopecia, cold sensory issues, ringing in ears, vertigo, cataracts, glaucoma, and neuropathy, all of which could end up being chronic and life-long. Some chemotherpyI discussed the risks of hand-foot syndrome and rashes, and development of other autoimmune mediated processes such as pneumonitis, hepatitis, and colitis which could be life threatening. I discussed the risks of the potential development of a rare but fatal viral mediated disease known as PML (Progressive Multifocal Leukoencephalopathy), and risk of future development of leukemia and/or lymphoma from use of certain chemotherapy agents. I discussed the need for neutropenic precautions, basic hygiene/sanitation behaviors and dietary restrictions.    The patient's consent has been obtained to proceed with the chemotherapy.The patient will be referred to Chemotherapy School /[facility] for training and education on chemotherapy, use of antiemetics and/or anti-diarrheals, use of NSAID's, potential chemotherapy side-effects, and any specific recommendations and precautions with the particular chemotherapy agents.      I answered all of the patient's (and family's, if applicable) questions to the best of my ability and to their complete satisfaction. The patient acknowledged full understanding of the risks, recommendations and plan(s).     I have  explained and the patient understands all of  the current recommendation(s). I have answered all of their questions to the best of my ability and to their complete satisfaction.        I have explained all of the above in detail and the patient understands all of the current recommendation(s). I have answered all of their questions to the best of my ability and to their complete satisfaction.   The patient is to continue with the current management plan.            Electronically signed by Briana Carrillo NP

## 2025-01-30 DIAGNOSIS — E11.9 TYPE 2 DIABETES MELLITUS WITHOUT COMPLICATION: ICD-10-CM

## 2025-02-05 ENCOUNTER — PATIENT MESSAGE (OUTPATIENT)
Dept: NUTRITION | Facility: HOSPITAL | Age: 75
End: 2025-02-05

## 2025-02-05 ENCOUNTER — INFUSION (OUTPATIENT)
Dept: INFUSION THERAPY | Facility: HOSPITAL | Age: 75
End: 2025-02-05
Attending: INTERNAL MEDICINE
Payer: MEDICARE

## 2025-02-05 VITALS
HEART RATE: 61 BPM | HEIGHT: 66 IN | SYSTOLIC BLOOD PRESSURE: 105 MMHG | WEIGHT: 147.63 LBS | RESPIRATION RATE: 18 BRPM | DIASTOLIC BLOOD PRESSURE: 65 MMHG | TEMPERATURE: 98 F | OXYGEN SATURATION: 100 % | BODY MASS INDEX: 23.72 KG/M2

## 2025-02-05 DIAGNOSIS — E86.0 DEHYDRATION: ICD-10-CM

## 2025-02-05 DIAGNOSIS — C01 MALIGNANT NEOPLASM OF BASE OF TONGUE: ICD-10-CM

## 2025-02-05 DIAGNOSIS — C32.9 LARYNX CANCER: Primary | ICD-10-CM

## 2025-02-05 PROCEDURE — A4216 STERILE WATER/SALINE, 10 ML: HCPCS | Performed by: INTERNAL MEDICINE

## 2025-02-05 PROCEDURE — 25000003 PHARM REV CODE 250: Performed by: INTERNAL MEDICINE

## 2025-02-05 PROCEDURE — 96523 IRRIG DRUG DELIVERY DEVICE: CPT

## 2025-02-05 PROCEDURE — 63600175 PHARM REV CODE 636 W HCPCS: Performed by: INTERNAL MEDICINE

## 2025-02-05 RX ORDER — SODIUM CHLORIDE 0.9 % (FLUSH) 0.9 %
10 SYRINGE (ML) INJECTION
OUTPATIENT
Start: 2025-02-05

## 2025-02-05 RX ORDER — HEPARIN 100 UNIT/ML
500 SYRINGE INTRAVENOUS
Status: DISCONTINUED | OUTPATIENT
Start: 2025-02-05 | End: 2025-02-05 | Stop reason: HOSPADM

## 2025-02-05 RX ORDER — SODIUM CHLORIDE 0.9 % (FLUSH) 0.9 %
10 SYRINGE (ML) INJECTION
Status: DISCONTINUED | OUTPATIENT
Start: 2025-02-05 | End: 2025-02-05 | Stop reason: HOSPADM

## 2025-02-05 RX ORDER — HEPARIN 100 UNIT/ML
500 SYRINGE INTRAVENOUS
OUTPATIENT
Start: 2025-02-05

## 2025-02-05 RX ADMIN — SODIUM CHLORIDE, PRESERVATIVE FREE 10 ML: 5 INJECTION INTRAVENOUS at 10:02

## 2025-02-05 RX ADMIN — HEPARIN 500 UNITS: 100 SYRINGE at 10:02

## 2025-02-13 ENCOUNTER — PATIENT MESSAGE (OUTPATIENT)
Dept: NUTRITION | Facility: HOSPITAL | Age: 75
End: 2025-02-13

## 2025-02-14 ENCOUNTER — LAB VISIT (OUTPATIENT)
Dept: LAB | Facility: HOSPITAL | Age: 75
End: 2025-02-14
Attending: INTERNAL MEDICINE
Payer: MEDICARE

## 2025-02-14 DIAGNOSIS — C32.9 LARYNGEAL CANCER: ICD-10-CM

## 2025-02-14 DIAGNOSIS — D50.0 IRON DEFICIENCY ANEMIA DUE TO CHRONIC BLOOD LOSS: ICD-10-CM

## 2025-02-14 LAB
ALBUMIN SERPL BCP-MCNC: 4.3 G/DL (ref 3.5–5.2)
ALP SERPL-CCNC: 135 U/L (ref 55–135)
ALT SERPL W/O P-5'-P-CCNC: 24 U/L (ref 10–44)
ANION GAP SERPL CALC-SCNC: 7 MMOL/L (ref 8–16)
AST SERPL-CCNC: 25 U/L (ref 10–40)
BASOPHILS # BLD AUTO: 0.02 K/UL (ref 0–0.2)
BASOPHILS NFR BLD: 0.4 % (ref 0–1.9)
BILIRUB SERPL-MCNC: 1 MG/DL (ref 0.1–1)
BUN SERPL-MCNC: 20 MG/DL (ref 8–23)
CALCIUM SERPL-MCNC: 8.8 MG/DL (ref 8.7–10.5)
CHLORIDE SERPL-SCNC: 98 MMOL/L (ref 95–110)
CO2 SERPL-SCNC: 27 MMOL/L (ref 23–29)
CREAT SERPL-MCNC: 0.9 MG/DL (ref 0.5–1.4)
DIFFERENTIAL METHOD BLD: ABNORMAL
EOSINOPHIL # BLD AUTO: 0.4 K/UL (ref 0–0.5)
EOSINOPHIL NFR BLD: 7.2 % (ref 0–8)
ERYTHROCYTE [DISTWIDTH] IN BLOOD BY AUTOMATED COUNT: 13.1 % (ref 11.5–14.5)
EST. GFR  (NO RACE VARIABLE): >60 ML/MIN/1.73 M^2
FERRITIN SERPL-MCNC: 105.6 NG/ML (ref 20–300)
GLUCOSE SERPL-MCNC: 110 MG/DL (ref 70–110)
HCT VFR BLD AUTO: 38.2 % (ref 40–54)
HGB BLD-MCNC: 12.7 G/DL (ref 14–18)
IMM GRANULOCYTES # BLD AUTO: 0.03 K/UL (ref 0–0.04)
IMM GRANULOCYTES NFR BLD AUTO: 0.6 % (ref 0–0.5)
IRON SERPL-MCNC: 57 UG/DL (ref 45–160)
LYMPHOCYTES # BLD AUTO: 0.7 K/UL (ref 1–4.8)
LYMPHOCYTES NFR BLD: 13.3 % (ref 18–48)
MCH RBC QN AUTO: 29.8 PG (ref 27–31)
MCHC RBC AUTO-ENTMCNC: 33.2 G/DL (ref 32–36)
MCV RBC AUTO: 90 FL (ref 82–98)
MONOCYTES # BLD AUTO: 0.4 K/UL (ref 0.3–1)
MONOCYTES NFR BLD: 8 % (ref 4–15)
NEUTROPHILS # BLD AUTO: 3.7 K/UL (ref 1.8–7.7)
NEUTROPHILS NFR BLD: 70.5 % (ref 38–73)
NRBC BLD-RTO: 0 /100 WBC
PLATELET # BLD AUTO: 122 K/UL (ref 150–450)
PMV BLD AUTO: 11.8 FL (ref 9.2–12.9)
POTASSIUM SERPL-SCNC: 4.1 MMOL/L (ref 3.5–5.1)
PROT SERPL-MCNC: 7 G/DL (ref 6–8.4)
RBC # BLD AUTO: 4.26 M/UL (ref 4.6–6.2)
SATURATED IRON: 18 % (ref 20–50)
SODIUM SERPL-SCNC: 132 MMOL/L (ref 136–145)
TOTAL IRON BINDING CAPACITY: 312 UG/DL (ref 250–450)
TRANSFERRIN SERPL-MCNC: 223 MG/DL (ref 200–375)
WBC # BLD AUTO: 5.25 K/UL (ref 3.9–12.7)

## 2025-02-14 PROCEDURE — 82728 ASSAY OF FERRITIN: CPT | Performed by: INTERNAL MEDICINE

## 2025-02-14 PROCEDURE — 36415 COLL VENOUS BLD VENIPUNCTURE: CPT | Performed by: INTERNAL MEDICINE

## 2025-02-14 PROCEDURE — 83540 ASSAY OF IRON: CPT | Performed by: INTERNAL MEDICINE

## 2025-02-14 PROCEDURE — 85025 COMPLETE CBC W/AUTO DIFF WBC: CPT | Performed by: INTERNAL MEDICINE

## 2025-02-14 PROCEDURE — 80053 COMPREHEN METABOLIC PANEL: CPT | Performed by: INTERNAL MEDICINE

## 2025-02-17 ENCOUNTER — PATIENT MESSAGE (OUTPATIENT)
Facility: CLINIC | Age: 75
End: 2025-02-17
Payer: MEDICARE

## 2025-02-24 DIAGNOSIS — J34.89 RHINORRHEA: ICD-10-CM

## 2025-02-24 DIAGNOSIS — C32.9 LARYNX CANCER: ICD-10-CM

## 2025-02-24 DIAGNOSIS — R05.9 COUGH, UNSPECIFIED TYPE: ICD-10-CM

## 2025-02-25 RX ORDER — PROMETHAZINE HYDROCHLORIDE AND DEXTROMETHORPHAN HYDROBROMIDE 6.25; 15 MG/5ML; MG/5ML
5 SYRUP ORAL EVERY 12 HOURS PRN
Qty: 300 ML | Refills: 5 | Status: SHIPPED | OUTPATIENT
Start: 2025-02-25 | End: 2025-08-30

## 2025-02-28 ENCOUNTER — HOSPITAL ENCOUNTER (OUTPATIENT)
Dept: RADIOLOGY | Facility: HOSPITAL | Age: 75
Discharge: HOME | End: 2025-02-28
Attending: NURSE PRACTITIONER
Payer: MEDICARE

## 2025-02-28 DIAGNOSIS — C32.9 LARYNGEAL CANCER: ICD-10-CM

## 2025-02-28 PROCEDURE — 71260 CT THORAX DX C+: CPT | Mod: 26,,, | Performed by: RADIOLOGY

## 2025-02-28 PROCEDURE — 71260 CT THORAX DX C+: CPT | Mod: TC,PO

## 2025-02-28 PROCEDURE — 25500020 PHARM REV CODE 255: Mod: PO | Performed by: NURSE PRACTITIONER

## 2025-02-28 PROCEDURE — 70491 CT SOFT TISSUE NECK W/DYE: CPT | Mod: 26,,, | Performed by: RADIOLOGY

## 2025-02-28 RX ADMIN — IOHEXOL 100 ML: 350 INJECTION, SOLUTION INTRAVENOUS at 08:02

## 2025-03-03 ENCOUNTER — OFFICE VISIT (OUTPATIENT)
Dept: SURGICAL ONCOLOGY | Facility: CLINIC | Age: 75
End: 2025-03-03
Payer: MEDICARE

## 2025-03-03 VITALS — RESPIRATION RATE: 16 BRPM | HEIGHT: 66 IN | BODY MASS INDEX: 24.03 KG/M2 | WEIGHT: 149.5 LBS

## 2025-03-03 DIAGNOSIS — C01 MALIGNANT NEOPLASM OF BASE OF TONGUE: ICD-10-CM

## 2025-03-03 DIAGNOSIS — C79.89 SECONDARY MALIGNANT NEOPLASM OF OTHER SPECIFIED SITES: Primary | ICD-10-CM

## 2025-03-03 PROCEDURE — 99999 PR PBB SHADOW E&M-EST. PATIENT-LVL III: CPT | Mod: PBBFAC,,, | Performed by: OTOLARYNGOLOGY

## 2025-03-03 NOTE — PROGRESS NOTES
CC: Surveillance    Oncology History   Larynx cancer   8/4/2020 Initial Diagnosis    Larynx neoplasm malignant     8/6/2020 Tumor Conference    His case was discussed at the Multidisciplinary Head and Neck Team Planning Meeting.    Representatives from Medical Oncology, Radiation Oncology, Head and Neck Surgical Oncology, Psychosocial Oncology, and Speech and Language Pathology discussed the case with the following recommendations:    1) definitive radiation              8/6/2020 Cancer Staged    Staging form: Larynx - Glottis, AJCC 8th Edition  - Clinical stage from 8/6/2020: Stage II (cT2, cN0, cM0)     8/6/2020 Cancer Staged    Cancer Staging  Larynx neoplasm malignant  Staging form: Larynx - Glottis, AJCC 8th Edition  - Clinical stage from 8/6/2020: Stage II (cT2, cN0, cM0) - Signed by Fariha Muñoz NP on 8/6/2020 12/16/2021 Tumor Conference    His case was discussed at the Multidisciplinary Head and Neck Team Planning Meeting.    Representatives from Medical Oncology, Radiation Oncology, Head and Neck Surgical Oncology, Psychosocial Oncology, and Speech and Language Pathology discussed the case with the following recommendations:    1) TL  2) oncology referral  3) psychology referral            12/27/2021 Surgery    1. DL And tracheostomy  2. PEG     1/6/2022 Surgery    1. Diagnostic direct laryngoscopy  2. Bilateral modified neck dissection of levels 2 through 4  3. Total laryngectomy  4. Total thyroidectomy with bilateral paratracheal/upper mediastinal neck dissection  5. Autotransplantation of left inferior parathyroid into left sternocleidomastoid muscle   6. Left anterolateral thigh free flap for closure of total laryngectomy neck skin defect  7. Advancement flap for closure of left thigh donor site advanced area was 25 x 10 cm     1/20/2022 Cancer Staged    Staging form: Larynx - Glottis, AJCC 8th Edition  - Pathologic stage from 1/20/2022: Stage LOU (pT4a, pN0, cM0)     5/30/2022 Cancer Staged     Staging form: Pharynx - P16 Negative Oropharynx, AJCC 8th Edition  - Clinical: Stage II (rcT2, cN0, cM0)     1/23/2023 -  Chemotherapy    Treatment Summary   Plan Name: OP HEAD NECK CISPLATIN WEEKLY + RADIOTHERAPY  Treatment Goal: Curative  Status: Active  Start Date: 1/23/2023  End Date: 3/6/2023  Provider: Venu Tamez MD  Chemotherapy: CISplatin (Platinol) 40 mg/m2 = 66 mg in sodium chloride 0.9% 631 mL chemo infusion, 40 mg/m2 = 66 mg, Intravenous, Clinic/HOD 1 time, 7 of 7 cycles  Administration: 66 mg (1/23/2023), 66 mg (2/13/2023), 66 mg (2/6/2023), 66 mg (2/20/2023), 66 mg (2/27/2023), 66 mg (3/6/2023)     Malignant neoplasm of base of tongue   5/20/2022 Cancer Staged    Staging form: Pharynx - P16 Negative Oropharynx, AJCC 8th Edition  - Clinical stage from 5/20/2022: Stage II (cT2, cN0, cM0, p16-)     6/22/2022 Initial Diagnosis    Primary cancer of base of tongue     7/11/2022 - 8/26/2022 Chemotherapy    Treatment Summary   Plan Name: OP HEAD NECK PEMBROLIZUMAB CARBOPLATIN FLUOROURACIL Q3W FOLLOWED BY MAINTENANCE PEMBROLIZUMAB 400 MG Q6W  Treatment Goal: Palliative  Status: Inactive  Start Date: 7/11/2022  End Date: 8/26/2022  Provider: Aurash Khoobehi, MD  Chemotherapy: CARBOplatin (PARAPLATIN) 440 mg in sodium chloride 0.9% 544 mL chemo infusion, 440 mg (100 % of original dose 438.5 mg), Intravenous, Clinic/HOD 1 time, 3 of 6 cycles  Dose modification:   (original dose 438.5 mg, Cycle 1)  Administration: 440 mg (7/11/2022), 435 mg (8/1/2022), 510 mg (8/22/2022)     1/23/2023 -  Chemotherapy    Treatment Summary   Plan Name: OP HEAD NECK CISPLATIN WEEKLY + RADIOTHERAPY  Treatment Goal: Curative  Status: Active  Start Date: 1/23/2023  End Date: 3/6/2023  Provider: Venu Tamez MD  Chemotherapy: CISplatin (Platinol) 40 mg/m2 = 66 mg in sodium chloride 0.9% 631 mL chemo infusion, 40 mg/m2 = 66 mg, Intravenous, [facility] 1 time, 7 of 7 cycles  Administration: 66 mg (1/23/2023), 66 mg  (2/13/2023), 66 mg (2/6/2023), 66 mg (2/20/2023), 66 mg (2/27/2023), 66 mg (3/6/2023)           HPI   74 y.o. male presents with the above treatment history.  No  new complaints.  Recent scans clear.    Review of Systems   Constitutional: Negative for fatigue and unexpected weight change.   HENT: Per HPI.  Eyes: Negative for visual disturbance.   Respiratory: Negative for shortness of breath, hemoptysis   Cardiovascular: Negative for chest pain and palpitations.   Musculoskeletal: Negative for decreased ROM, back pain.   Skin: Negative for rash, sunburn, itching.   Neurological: Negative for dizziness and seizures.   Hematological: Negative for adenopathy. Does not bruise/bleed easily.   Endocrine: Negative for rapid weight loss/weight gain, heat/cold intolerance.     Past Medical History   Patient Active Problem List   Diagnosis    Melanocytic nevus    Body mass index (BMI) of 29.0-29.9 in adult    Benign hypertension    Overweight    Type 2 diabetes mellitus with diabetic arthropathy, with long-term current use of insulin    Fatty liver disease, nonalcoholic    False positive serological test for hepatitis C    Hyperlipidemia    Type 2 diabetes mellitus with hyperglycemia, without long-term current use of insulin    Gastroesophageal reflux disease without esophagitis    Type 2 diabetes mellitus with hyperglycemia    Vitamin B12 deficiency    Hyperuricemia    Hypovitaminosis D    Proteinuria    On enteral nutrition    Larynx cancer    Dehydration    Adverse effect of adrenal cortical steroids, sequela    S/P laryngectomy    Hypocalcemia    Aphonia    Dysphagia, pharyngoesophageal    Acute pain of both shoulders    Bilateral arm weakness    Oral bleeding    Malignant neoplasm of base of tongue    Advanced care planning/counseling discussion    Iron deficiency anemia due to chronic blood loss    Postoperative hypothyroidism    Pressure injury of sacral region, stage 1    Pneumatosis intestinalis    Nausea and  vomiting    Neutropenia    Abnormal CT scan, neck    Open wound of neck    Open wnd of pharynx    Open wound of left thigh    Open wound of mouth    Tracheostomy in place    Type 2 diabetes mellitus, without long-term current use of insulin    Laryngeal cancer    Hypocalcemia    Hyperbilirubinemia    Elevated transaminase level    Hyponatremia    PEG (percutaneous endoscopic gastrostomy) adjustment/replacement/removal    Hypothyroidism    Dehydration    Pharyngocutaneous fistula    Elevated alkaline phosphatase level    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    Scar conditions and fibrosis of skin    Decreased ROM of neck    Iron deficiency anemia    Phantom pain    Chronic low back pain    Palliative care by specialist    Cancer related pain    S/P percutaneous endoscopic gastrostomy (PEG) tube placement    Secondary malignant neoplasm of other specified sites    Postprocedural hypoparathyroidism    Aortic atherosclerosis    Breast pain, right    Lesion of right nipple    Gynecomastia    Nipple soreness           Past Surgical History   Past Surgical History:   Procedure Laterality Date    CATARACT EXTRACTION W/  INTRAOCULAR LENS IMPLANT Right 8/16/2023    Procedure: CEIOL OD  NPO-peg;  Surgeon: Stoney Munoz MD;  Location: [facility] OR;  Service: Ophthalmology;  Laterality: Right;    CATARACT EXTRACTION W/  INTRAOCULAR LENS IMPLANT Left 10/18/2023    Procedure: CEIOL OS npo peg;  Surgeon: Stoney Munoz MD;  Location: [facility] OR;  Service: Ophthalmology;  Laterality: Left;    DIRECT LARYNGOBRONCHOSCOPY N/A 12/27/2021    Procedure: LARYNGOSCOPY, DIRECT, WITH BRONCHOSCOPY;  Surgeon: Flex Espinosa MD;  Location: [address];  Service: ENT;  Laterality: N/A;    DIRECT LARYNGOSCOPY  11/9/2022    Procedure: LARYNGOSCOPY, DIRECT;  Surgeon: Jesse James MD;  Location: [facility] OR [address] [place] [address];  Service: ENT;;    DISSECTION OF NECK Bilateral 1/6/2022    Procedure: DISSECTION, NECK;  Surgeon: Jesse James MD;   Location: [facility] OR 2ND FLR;  Service: ENT;  Laterality: Bilateral;    DISSECTION OF NECK Bilateral 11/9/2022    Procedure: DISSECTION, NECK;  Surgeon: Jesse James MD;  Location: [facility] OR 2ND FLR;  Service: ENT;  Laterality: Bilateral;    ESOPHAGOGASTRODUODENOSCOPY N/A 4/9/2024    Procedure: EGD (ESOPHAGOGASTRODUODENOSCOPY);  Surgeon: Matheus Cooper III, MD;  Location: [facility] ENDO;  Service: Endoscopy;  Laterality: N/A;    FLAP PROCEDURE Right 1/6/2022    Procedure: CREATION, FREE FLAP;  Surgeon: Alise Hart MD;  Location: [facility] OR [facility] FLR;  Service: ENT;  Laterality: Right;  Ischemic start 1351  Ischemic stop 1502    FLAP PROCEDURE Left 11/9/2022    Procedure: CREATION, FREE FLAP, ALT;  Surgeon: Alise Hart MD;  Location: [facility] OR [facility] FLR;  Service: ENT;  Laterality: Left;    GLOSSECTOMY Bilateral 11/9/2022    Procedure: TOTAL GLOSSECTOMY;  Surgeon: Jesse James MD;  Location: [facility] OR [facility] FLR;  Service: ENT;  Laterality: Bilateral;    INSERTION OF TUNNELED CENTRAL VENOUS CATHETER (CVC) WITH SUBCUTANEOUS PORT N/A 6/9/2022    Procedure: [id number];  Surgeon: Jesus Viera MD;  Location: [facility] OR;  Service: General;  Laterality: N/A;    INSERTION OF TUNNELED CENTRAL VENOUS CATHETER (CVC) WITH SUBCUTANEOUS PORT Right 1/12/2023    Procedure: [id number];  Surgeon: Abdulaziz Le Jr., MD;  Location: [facility] OR;  Service: General;  Laterality: Right;    LARYNGECTOMY N/A 1/6/2022    Procedure: LARYNGECTOMY;  Surgeon: Jesse James MD;  [facility];  Service: ENT;  Laterality: N/A;    LARYNGOSCOPY N/A 8/4/2020    Procedure: Suspension microlaryngoscopy with biopsy, possible KTP laser treatment/excision;  Surgeon: Stew Noel MD;  Location: [facility] OR [facility] FLR;  Service: ENT;  Laterality: N/A;  Microscope, telescopes, tower, microinstruments, KTP laser, rep conf# [id number] IC 7/28.    LARYNGOSCOPY N/A 3/16/2021    Procedure: Suspension microlaryngoscopy  with excision of lesion, possible CO2 laser;  Surgeon: Stew Noel MD;  [facility];  Service: ENT;  Laterality: N/A;  Microscope, telescopes, tower, microinstruments, CO2 laser, rep conf# [id number] IC 3/4.    LARYNGOSCOPY N/A 4/1/2021    Procedure: Suspension microlaryngoscopy with KTP laser excision of lesion;  Surgeon: Stew Noel MD;  [facility];  Service: ENT;  Laterality: N/A;  Microscope, telescopes, tower, microinstruments, 70 degree scope, vocal fold , KTP laser, rep conf# [id number] BC    LARYNGOSCOPY N/A 12/9/2021    Procedure: Suspension microlaryngoscopy with biopsy;  Surgeon: Stew Noel MD;  [facility];  Service: ENT;  Laterality: N/A;  Microscope, telescopes, tower, microinstruments    LARYNGOSCOPY N/A 1/6/2022    Procedure: LARYNGOSCOPY;  Surgeon: Jesse James MD;  [facility];  Service: ENT;  Laterality: N/A;    LARYNGOSCOPY N/A 4/27/2022    Procedure: LARYNGOSCOPY WITH BIOPSY;  Surgeon: Jesse James MD;  [facility];  Service: ENT;  Laterality: N/A;    MICROLARYNGOSCOPY N/A 3/17/2020    Procedure: MICROLARYNGOSCOPY;  Surgeon: Jung Xiao MD;  Location: [facility];  Service: ENT;  Laterality: N/A;  Laser Microlaryngoscopy  NEED TO SCHEDULE LASER from [facility] [id number]    PHARYNGECTOMY  11/9/2022    Procedure: TOTAL PHARYNGECTOMY;  Surgeon: Jesse James MD;  [facility];  Service: ENT;;    REIMPLANTATION OF PARATHYROID TISSUE N/A 1/6/2022    Procedure: REIMPLANTATION, PARATHYROID TISSUE;  Surgeon: Jesse James MD;  [facility];  Service: ENT;  Laterality: N/A;    SKIN SPLIT GRAFT Right 11/9/2022    Procedure: APPLICATION, GRAFT, SKIN, SPLIT-THICKNESS;  Surgeon: Jesse James MD;  Location: [facility] OR [address] [place] [address];  Service: ENT;  Laterality: Right;    THYROIDECTOMY  1/6/2022    Procedure: THYROIDECTOMY;  Surgeon: Jesse James,  MD;  Location: [facility] OR [address] [place] [address];  Service: ENT;;    TRACHEOSTOMY N/A 12/27/2021    Procedure: CREATION, TRACHEOSTOMY;  Surgeon: Flex Espinosa MD;  Location: [facility] OR [address] [place] [address];  Service: ENT;  Laterality: N/A;         Family History   Family History   Problem Relation Name Age of Onset    Abnormal EKG Mother Hayley     Diabetes Father Nicolás     Heart disease Father Nicolás     Hypertension Father Nicolás            Social History   .  Social History     Socioeconomic History    Marital status:      Spouse name: Janel Batres    Number of children: 2   Occupational History    Occupation: [employer]     Employer: [employer]   Tobacco Use    Smoking status: Never    Smokeless tobacco: Never   Substance and Sexual Activity    Alcohol use: Not Currently     Comment: occasional    Drug use: No    Sexual activity: Yes     Partners: Female   Social History Narrative    ** Merged History Encounter **         2 children from his 1st wife     Social Drivers of Health     Financial Resource Strain: Low Risk  (2/2/2024)    Overall Financial Resource Strain (CARDIA)     Difficulty of Paying Living Expenses: Not hard at all   Food Insecurity: No Food Insecurity (2/2/2024)    Hunger Vital Sign     Worried About Running Out of Food in the Last Year: Never true     Ran Out of Food in the Last Year: Never true   Transportation Needs: No Transportation Needs (2/2/2024)    PRAPARE - Transportation     Lack of Transportation (Medical): No     Lack of Transportation (Non-Medical): No   Physical Activity: Insufficiently Active (2/2/2024)    Exercise Vital Sign     Days of Exercise per Week: 3 days     Minutes of Exercise per Session: 30 min   Stress: No Stress Concern Present (2/2/2024)    [place] [employer] - Occupational Stress Questionnaire     Feeling of Stress : Not at all   Housing Stability: Low Risk  (2/2/2024)    Housing Stability Vital Sign     Unable to Pay for Housing in the Last Year: No      Number of Places Lived in the Last Year: 1     Unstable Housing in the Last Year: No         Allergies   Review of patient's allergies indicates:   Allergen Reactions    Lovastatin Itching    Pollen extracts     Lovastatin Rash     Not confirmed but pt skeptical           Physical Exam     Vitals:    03/03/25 1314   Resp: 16             Body mass index is 24.13 kg/m².      General: AOx3, NAD   Respiratory:  Symmetric chest rise, normal effort  Oral Cavity:  Flap healthy. No worrisome lesions. Moderate trismus.  Oropharynx:  No masses/lesions of the posterior pharyngeal wall. Tonsillar fossa without lesions. Soft palate without masses. Midline uvula.   Neck: Stoma widely patent. Skin paddle healthy with good color and turgor.Well-healed neck incisions.No LAD. Moderate post-op, post-treatment fibrosis. Submental neck diffusely full.  Face: House Brackmann I bilaterally.    Scope via OC: No lesions of neopharynx, trachea clear.      Assessment/Plan  Problem List Items Addressed This Visit          Oncology    Malignant neoplasm of base of tongue    ZAY.  RTC 4 mos, sooner for Botox.             Secondary malignant neoplasm of other specified sites - Primary

## 2025-03-14 ENCOUNTER — LAB VISIT (OUTPATIENT)
Dept: LAB | Facility: HOSPITAL | Age: 75
End: 2025-03-14
Attending: INTERNAL MEDICINE
Payer: MEDICARE

## 2025-03-14 DIAGNOSIS — D50.0 IRON DEFICIENCY ANEMIA DUE TO CHRONIC BLOOD LOSS: ICD-10-CM

## 2025-03-14 DIAGNOSIS — C32.9 LARYNGEAL CANCER: ICD-10-CM

## 2025-03-14 LAB
ALBUMIN SERPL BCP-MCNC: 4.3 G/DL (ref 3.5–5.2)
ALP SERPL-CCNC: 155 U/L (ref 55–135)
ALT SERPL W/O P-5'-P-CCNC: 34 U/L (ref 10–44)
ANION GAP SERPL CALC-SCNC: 7 MMOL/L (ref 8–16)
AST SERPL-CCNC: 32 U/L (ref 10–40)
BASOPHILS # BLD AUTO: 0.01 K/UL (ref 0–0.2)
BASOPHILS NFR BLD: 0.2 % (ref 0–1.9)
BILIRUB SERPL-MCNC: 0.9 MG/DL (ref 0.1–1)
BUN SERPL-MCNC: 23 MG/DL (ref 8–23)
CALCIUM SERPL-MCNC: 8.6 MG/DL (ref 8.7–10.5)
CHLORIDE SERPL-SCNC: 99 MMOL/L (ref 95–110)
CO2 SERPL-SCNC: 26 MMOL/L (ref 23–29)
CREAT SERPL-MCNC: 1 MG/DL (ref 0.5–1.4)
DIFFERENTIAL METHOD BLD: ABNORMAL
EOSINOPHIL # BLD AUTO: 0.2 K/UL (ref 0–0.5)
EOSINOPHIL NFR BLD: 4.1 % (ref 0–8)
ERYTHROCYTE [DISTWIDTH] IN BLOOD BY AUTOMATED COUNT: 13.6 % (ref 11.5–14.5)
EST. GFR  (NO RACE VARIABLE): >60 ML/MIN/1.73 M^2
FERRITIN SERPL-MCNC: 73.9 NG/ML (ref 20–300)
GLUCOSE SERPL-MCNC: 141 MG/DL (ref 70–110)
HCT VFR BLD AUTO: 38.6 % (ref 40–54)
HGB BLD-MCNC: 12.9 G/DL (ref 14–18)
IMM GRANULOCYTES # BLD AUTO: 0.05 K/UL (ref 0–0.04)
IMM GRANULOCYTES NFR BLD AUTO: 1 % (ref 0–0.5)
IRON SERPL-MCNC: 73 UG/DL (ref 45–160)
LYMPHOCYTES # BLD AUTO: 0.7 K/UL (ref 1–4.8)
LYMPHOCYTES NFR BLD: 14.1 % (ref 18–48)
MCH RBC QN AUTO: 30 PG (ref 27–31)
MCHC RBC AUTO-ENTMCNC: 33.4 G/DL (ref 32–36)
MCV RBC AUTO: 90 FL (ref 82–98)
MONOCYTES # BLD AUTO: 0.4 K/UL (ref 0.3–1)
MONOCYTES NFR BLD: 7.5 % (ref 4–15)
NEUTROPHILS # BLD AUTO: 3.8 K/UL (ref 1.8–7.7)
NEUTROPHILS NFR BLD: 73.1 % (ref 38–73)
NRBC BLD-RTO: 0 /100 WBC
PLATELET # BLD AUTO: 115 K/UL (ref 150–450)
PMV BLD AUTO: 12.3 FL (ref 9.2–12.9)
POTASSIUM SERPL-SCNC: 4.2 MMOL/L (ref 3.5–5.1)
PROT SERPL-MCNC: 6.8 G/DL (ref 6–8.4)
RBC # BLD AUTO: 4.3 M/UL (ref 4.6–6.2)
SATURATED IRON: 23 % (ref 20–50)
SODIUM SERPL-SCNC: 132 MMOL/L (ref 136–145)
TOTAL IRON BINDING CAPACITY: 321 UG/DL (ref 250–450)
TRANSFERRIN SERPL-MCNC: 229 MG/DL (ref 200–375)
WBC # BLD AUTO: 5.17 K/UL (ref 3.9–12.7)

## 2025-03-14 PROCEDURE — 85025 COMPLETE CBC W/AUTO DIFF WBC: CPT | Performed by: INTERNAL MEDICINE

## 2025-03-14 PROCEDURE — 36415 COLL VENOUS BLD VENIPUNCTURE: CPT | Performed by: INTERNAL MEDICINE

## 2025-03-14 PROCEDURE — 83540 ASSAY OF IRON: CPT | Performed by: INTERNAL MEDICINE

## 2025-03-14 PROCEDURE — 80053 COMPREHEN METABOLIC PANEL: CPT | Performed by: INTERNAL MEDICINE

## 2025-03-14 PROCEDURE — 82728 ASSAY OF FERRITIN: CPT | Performed by: INTERNAL MEDICINE

## 2025-03-20 ENCOUNTER — OFFICE VISIT (OUTPATIENT)
Facility: CLINIC | Age: 75
End: 2025-03-20
Payer: MEDICARE

## 2025-03-20 VITALS
OXYGEN SATURATION: 98 % | RESPIRATION RATE: 16 BRPM | HEIGHT: 66 IN | WEIGHT: 154.13 LBS | DIASTOLIC BLOOD PRESSURE: 62 MMHG | BODY MASS INDEX: 24.77 KG/M2 | HEART RATE: 65 BPM | SYSTOLIC BLOOD PRESSURE: 119 MMHG | TEMPERATURE: 98 F

## 2025-03-20 DIAGNOSIS — C32.9 LARYNX CANCER: Primary | ICD-10-CM

## 2025-03-20 DIAGNOSIS — K76.0 FATTY LIVER DISEASE, NONALCOHOLIC: ICD-10-CM

## 2025-03-20 DIAGNOSIS — Z90.02 S/P LARYNGECTOMY: ICD-10-CM

## 2025-03-20 DIAGNOSIS — T45.1X5A CHEMOTHERAPY-INDUCED NEUTROPENIA: ICD-10-CM

## 2025-03-20 DIAGNOSIS — Z93.0 TRACHEOSTOMY IN PLACE: ICD-10-CM

## 2025-03-20 DIAGNOSIS — D50.0 IRON DEFICIENCY ANEMIA DUE TO CHRONIC BLOOD LOSS: ICD-10-CM

## 2025-03-20 DIAGNOSIS — E53.8 VITAMIN B12 DEFICIENCY: ICD-10-CM

## 2025-03-20 DIAGNOSIS — D50.9 IRON DEFICIENCY ANEMIA, UNSPECIFIED IRON DEFICIENCY ANEMIA TYPE: ICD-10-CM

## 2025-03-20 DIAGNOSIS — C32.9 LARYNGEAL CANCER: ICD-10-CM

## 2025-03-20 DIAGNOSIS — C01 MALIGNANT NEOPLASM OF BASE OF TONGUE: ICD-10-CM

## 2025-03-20 DIAGNOSIS — C79.89 SECONDARY MALIGNANT NEOPLASM OF OTHER SPECIFIED SITES: ICD-10-CM

## 2025-03-20 DIAGNOSIS — D70.1 CHEMOTHERAPY-INDUCED NEUTROPENIA: ICD-10-CM

## 2025-03-20 PROCEDURE — 3074F SYST BP LT 130 MM HG: CPT | Mod: CPTII,S$GLB,, | Performed by: INTERNAL MEDICINE

## 2025-03-20 PROCEDURE — 1159F MED LIST DOCD IN RCRD: CPT | Mod: CPTII,S$GLB,, | Performed by: INTERNAL MEDICINE

## 2025-03-20 PROCEDURE — 3078F DIAST BP <80 MM HG: CPT | Mod: CPTII,S$GLB,, | Performed by: INTERNAL MEDICINE

## 2025-03-20 PROCEDURE — 1160F RVW MEDS BY RX/DR IN RCRD: CPT | Mod: CPTII,S$GLB,, | Performed by: INTERNAL MEDICINE

## 2025-03-20 PROCEDURE — 99215 OFFICE O/P EST HI 40 MIN: CPT | Mod: S$GLB,,, | Performed by: INTERNAL MEDICINE

## 2025-03-20 PROCEDURE — 1101F PT FALLS ASSESS-DOCD LE1/YR: CPT | Mod: CPTII,S$GLB,, | Performed by: INTERNAL MEDICINE

## 2025-03-20 PROCEDURE — G2211 COMPLEX E/M VISIT ADD ON: HCPCS | Mod: S$GLB,,, | Performed by: INTERNAL MEDICINE

## 2025-03-20 PROCEDURE — 99999 PR PBB SHADOW E&M-EST. PATIENT-LVL IV: CPT | Mod: PBBFAC,,, | Performed by: INTERNAL MEDICINE

## 2025-03-20 PROCEDURE — 3288F FALL RISK ASSESSMENT DOCD: CPT | Mod: CPTII,S$GLB,, | Performed by: INTERNAL MEDICINE

## 2025-03-20 PROCEDURE — 1126F AMNT PAIN NOTED NONE PRSNT: CPT | Mod: CPTII,S$GLB,, | Performed by: INTERNAL MEDICINE

## 2025-03-20 PROCEDURE — 3008F BODY MASS INDEX DOCD: CPT | Mod: CPTII,S$GLB,, | Performed by: INTERNAL MEDICINE

## 2025-03-20 PROCEDURE — 1157F ADVNC CARE PLAN IN RCRD: CPT | Mod: CPTII,S$GLB,, | Performed by: INTERNAL MEDICINE

## 2025-03-20 NOTE — PROGRESS NOTES
[facility] Hematology/Oncology  PROGRESS NOTE -  Follow-up Visit      Subjective:       Patient ID:   NAME: Cyrus Batres Jr. : 1950     74 y.o. 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  The patient is here today to go over the results of the recently ordered labs, tests and studies. He is here by himself.      He previously had had completed chemotherapy and  XRT.      He saw Dr James with ENT again on 3/3/ and had laryngoscope and he sees him again in 4 months    He saw [facility] NP in Dec 2024    He saw Dr Patterson in 2024; he saw Dr Padilla with cardiology in Oct 2024; Dena Silveira NP with endocrine in 2024    He had CT scans on 2025    He is doing well and is active at home.      He is breathing ok. 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 He had his portacath removed on  and replaced on 2023 by Dr Le        ROS:   GEN: normal without any fever, night sweats or weight loss; doing well with tube feeds   HEENT: normal with no HA's, sore throat, stiff neck, changes in vision  CV: normal with no CP, SOB, PND, no currentDOE  PULM: normal with no SOB, cough, hemoptysis, sputum or pleuritic pain  GI: no current N/V  ; has peg tube;    : normal with no hematuria, dysuria  BREAST: normal with no mass,  discharge, pain  SKIN: normal with no rash, erythema, bruising, or swelling     Pain Scale:  0    Allergies:  Review of patient's allergies indicates:   Allergen Reactions    Lovastatin Itching    Pollen extracts     Lovastatin Rash     Not confirmed but pt skeptical       Medications:    Current Outpatient Medications:     calcium carbonate 500 mg/5 mL (1,250 mg/5 mL), Take 20 mls by mouth four times daily with meals and nightly., Disp: 2300 mL, Rfl: 12    diclofenac sodium (VOLTAREN ARTHRITIS PAIN) 1 % Gel, Apply 2 g topically once daily. Apply couple of times a day on the affected arthritis pain., Disp: 100 g, Rfl: 1    levothyroxine (SYNTHROID) 100 MCG tablet, Take 1 tablet (100 mcg total) by mouth before breakfast., Disp: 90 tablet, Rfl: 3    promethazine-dextromethorphan (PROMETHAZINE-DM) 6.25-15 mg/5 mL Syrp, Take 5 mLs by mouth every 12 (twelve) hours as needed (cough and cogestion)., Disp: 300 mL, Rfl: 5    acetaminophen (TYLENOL) 325 MG tablet, Take 325 mg by mouth every 6 (six) hours as needed for Pain., Disp: , Rfl:     esomeprazole (NEXIUM) 40 MG capsule, 40 mg before breakfast., Disp: , Rfl:     levothyroxine (SYNTHROID) 125 MCG tablet, 1 tablet (125 mcg total) by Per G Tube route before breakfast., Disp: 90 tablet, Rfl: 3    PMHx/PSHx Updates:  See patient's last visit with me on 8/20/2024  See H&P on 9/23/2022        Pathology:   Cancer Staging   Larynx cancer  Staging form: Larynx - Glottis, AJCC 8th Edition  - Clinical stage from 8/6/2020: Stage II (cT2, cN0, cM0) - Signed by Fariha Muñoz NP on 8/6/2020  - Pathologic stage from 1/20/2022: Stage LOU (pT4a, pN0, cM0) - Signed by Fariha Muñoz NP on 1/20/2022  Staging form: Pharynx - P16 Negative Oropharynx, AJCC 8th Edition  - Clinical: Stage II (rcT2, cN0, cM0) - Signed by Matheus Portillo Jr., MD on 5/30/2022    Malignant neoplasm of base of tongue  Staging form: Pharynx - P16 Negative Oropharynx, AJCC 8th Edition  - Clinical stage from  "5/20/2022: Stage II (cT2, cN0, cM0, p16-) - Signed by Saúl Kessler MD on 7/7/2022    Dissection 11/9/2022:    Final Pathologic Diagnosis 1. "left submandibular lymph node", dissection:       - Three lymph nodes, negative for carcinoma (0/3)   2. 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       - Tumor size: 4.5 cm       - Resection margins: left superior pharyngeal margin focally involved;   all other margins are negative for carcinoma       - Left internal jugular vein: free of tumor       - One lymph node, negative for carcinoma (0/1)   Note: P16 immunostain is negative     Tumor Site       Oropharynx  involving Base of tongue       Tumor Laterality       Midline       Tumor Size       Greatest Dimension (Centimeters) 4.5 cm         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)       Histologic Grade       G2 to G3: Moderate to Poorly differentiated       Lymphovascular Invasion       Not identified       Perineural Invasion       Not identified     MARGINS      Margins       Involved by invasive tumor     Margin(s)   Involved by Invasive Tumor:      left superior pharyngeal margin; all other   margins are free of tumor     LYMPH NODES    Regional Lymph Node Status:    :     Regional Lymph Node   Status:      All regional lymph nodes negative for tumor      pT Category:               pT3   pN Category:               pN0      Base of Tongue Biopsy  4/27/2022:  Final Pathologic Diagnosis BIOPSY OF BASE OF THE TONGUE:   THE INFILTRATING POORLY DIFFERENTIATED SQUAMOUS CELL CARCINOMA   THE TUMOR IS P16 NEGATIVE.  THE POSITIVE AND NEGATIVE CONTROLS STAINED   APPROPRIATELY      Larynx resection/thyroidectomy 1/6/2022:     Final Pathologic Diagnosis 1. Lymph nodes, left neck levels 2,  3 and 4, dissection:   - Nineteen lymph nodes, all  negative  for metastatic carcinoma (0/19)   2. Lymph nodes, " right neck levels 2,  3 and 4, dissection:   - Twenty-eight lymph nodes, all  negative  for metastatic carcinoma (0/28)   3. Possible parathyroid gland, excision:   - Benign parathyroid gland tissue (0.003 g)   4. 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 Soft tissue, posterior tracheal margin, excision:   - Benign, focally reactive respiratory mucosa with submucosal acute   inflammation,  negative  for dysplasia or malignancy   6. Soft tissue, anterior tracheal margin, excision:   - Benign respiratory mucosa with subepithelial cartilage,  negative  for   dysplasia or malignancy   7. Soft tissue, posterior tracheal margin #2, excision:   - Benign, focally reactive respiratory mucosa with submucosal acute   inflammation,  negative  for dysplasia or malignancy   8. Soft tissue, anterior tracheal margin #2, excision:   - Benign, reactive focally ulcerated respiratory mucosa with submucosal acute   inflammation,  negative  for dysplasia or malignancy             Laryngoscope 8/4/2020: (with Dr Noel):  Final Pathologic Diagnosis 1.  Left true vocal fold, biopsy:       -  Invasive moderately differentiated squamous cell carcinoma,   keratinizing type   2.  Left true vocal fold, biopsy:       -  Invasive moderately differentiated squamous cell carcinoma,   keratinizing type             Laryngoscope 3/17/2020 (with Dr Xiao):  Final Pathologic Diagnosis 1.  BIOPSY OF LEFT TRUE VOCAL CORD:   SEVERELY DYSPLASTIC APPEARING SQUAMOUS MUCOSA   INVASIVE CARCINOMA  "IS NOT DOCUMENTED   ON THE OTHER HAND, THESE FRAGMENTS ARE NODUL AND WITHOUT SUBMUCOSA FOR   EVALUATION; IT IS POSSIBLE THAT THEY REFLECT INVASIVE SQUAMOUS CARCINOMA   2.  BIOPSY OF LEFT ANTERIOR COMMISSURE:   MODERATE DYSPLASIA   NO INVASIVE CARCINOMA IDENTIFIED             Objective:     Vitals:  Blood pressure 119/62, pulse 65, temperature 97.8 °F (36.6 °C), temperature source Temporal, resp. rate 16, height 5' 6" (1.676 m), weight 69.9 kg (154 lb 1.6 oz), SpO2 98%.    Physical Examination:   GEN: no apparent distress, comfortable; AAOx3  HEAD: atraumatic and normocephalic  EYES: no pallor, no icterus, PERRLA  ENT: OMM, no pharyngeal erythema, external ears WNL; no nasal discharge; no thrush; s/p laryngectomy with flap since healed well; trach   NECK: no masses, thyroid normal, trachea midline, no LAD/LN's, supple; post-op dissection healed well;    CV: RRR with no murmur; normal pulse; normal S1 and S2; no pedal edema; portacath   CHEST: Normal respiratory effort; CTAB; normal breath sounds; no wheeze or crackles  ABDOM: nontender and nondistended; soft; normal bowel sounds; no rebound/guarding; peg tube  MUSC/Skeletal: ROM normal; no crepitus; joints normal; no deformities or arthropathy  EXTREM: no clubbing, cyanosis, inflammation or swelling  SKIN: no rashes, lesions, ulcers, petechiae or subcutaneous nodules  : no mahan  NEURO: grossly intact; motor/sensory WNL; AAOx3; no tremors  PSYCH: normal mood, affect and behavior  LYMPH: normal cervical, supraclavicular, axillary and groin LN's          Labs:     Lab Results   Component Value Date    WBC 5.17 03/14/2025    HGB 12.9 (L) 03/14/2025    HCT 38.6 (L) 03/14/2025    MCV 90 03/14/2025     (L) 03/14/2025     CMP  Sodium   Date Value Ref Range Status   03/14/2025 132 (L) 136 - 145 mmol/L Final     Potassium   Date Value Ref Range Status   03/14/2025 4.2 3.5 - 5.1 mmol/L Final     Chloride   Date Value Ref Range Status   03/14/2025 99 95 - 110 mmol/L " Final     CO2   Date Value Ref Range Status   03/14/2025 26 23 - 29 mmol/L Final     Glucose   Date Value Ref Range Status   03/14/2025 141 (H) 70 - 110 mg/dL Final     BUN   Date Value Ref Range Status   03/14/2025 23 8 - 23 mg/dL Final     Creatinine   Date Value Ref Range Status   03/14/2025 1.0 0.5 - 1.4 mg/dL Final     Calcium   Date Value Ref Range Status   03/14/2025 8.6 (L) 8.7 - 10.5 mg/dL Final     Total Protein   Date Value Ref Range Status   03/14/2025 6.8 6.0 - 8.4 g/dL Final     Albumin   Date Value Ref Range Status   03/14/2025 4.3 3.5 - 5.2 g/dL Final   11/18/2020 3.7 3.6 - 5.1 g/dL Final     Comment:     For additional information, please refer to   http://education.[employer]/faq/LDM400 (This link is   being provided for informational/ educational purposes only.)  This test was developed and its analytical performance   characteristics have been determined by [facility]. It has not been cleared or approved by the   US Food and Drug Administration. This assay has been validated   pursuant to the CLIA regulations and is used for clinical   purposes.  @ Test Performed By:  [facility]  Yo Cortes M.D.,   [address] [place] [id number]  [place]  [id number]       Total Bilirubin   Date Value Ref Range Status   03/14/2025 0.9 0.1 - 1.0 mg/dL Final     Comment:     For infants and newborns, interpretation of results should be based  on gestational age, weight and in agreement with clinical  observations.    Premature Infant recommended reference ranges:  Up to 24 hours.............<8.0 mg/dL  Up to 48 hours............<12.0 mg/dL  3-5 days..................<15.0 mg/dL  6-29 days.................<15.0 mg/dL       Alkaline Phosphatase   Date Value Ref Range Status   03/14/2025 155 (H) 55 - 135 U/L Final     AST   Date Value Ref Range Status   03/14/2025 32 10 - 40 U/L Final     ALT   Date Value Ref Range  Status   03/14/2025 34 10 - 44 U/L Final     Anion Gap   Date Value Ref Range Status   03/14/2025 7 (L) 8 - 16 mmol/L Final     eGFR if    Date Value Ref Range Status   07/29/2022 >60.0 >60 mL/min/1.73 m^2 Final     eGFR if non    Date Value Ref Range Status   07/29/2022 >60.0 >60 mL/min/1.73 m^2 Final     Comment:     Calculation used to obtain the estimated glomerular filtration  rate (eGFR) is the CKD-EPI equation.                   Radiology/Diagnostic Studies:      CT Neck/Chest 2/28/2025:    Impression:     1. No CT evidence of residual or recurrent malignancy.  2. Extensive postoperative change in the neck, stable in appearance.  3. 9 mm endotracheal density in the upper chest, likely retained secretions.  4. Multifocal coronary artery atherosclerotic calcification.  5. New right-sided mild gynecomastia.  6. Additional findings as above.        PET 2/7/2024:  IMPRESSION:     Extensive postoperative changes of the neck as outlined above, stable compared to prior PET/CT.  No convincing evidence of residual/recurrent FDG avid malignancy.     Mild FDG avidity of the proximal thoracic esophagus near the operative site, decreased compared to prior.     FDG avidity of arthritic right sternoclavicular joint          Ct Chest/Neck 9/21/2023:    IMPRESSION:     1. Extensive postsurgical changes throughout the neck as described, with no evidence of residual or recurrent malignancy.  2. Negative for metastatic disease throughout the neck or the chest.  3. 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  4. Coronary artery calcifications.        PET 5/30/2023:    IMPRESSION: Postsurgical changes from total glossectomy, laryngectomy and pharyngectomy with bilateral neck dissections     There is resolution of the previously noted fluid collections within the neck. There is mild FDG activity in the left side the neck adjacent to the neoesophagus as well as at the tracheostomy site  to the right of the neoesophagus. Findings most likely are secondary to postsurgical and postinfection changes. There is no focal mass or adenopathy     No evidence of distant metastasis             CTA Neck    Result Date: 9/20/2022  EXAMINATION: CTA NECK CLINICAL HISTORY: Head/neck cancer, monitor;Malignant neoplasm of base of tongue TECHNIQUE: CT angiogram was performed from the level of the scott to the EAC following the IV administration of 75mL of Omnipaque 350.   Sagittal and coronal reconstructions and maximum intensity projection reconstructions were performed. Arterial stenosis percentages are based on NASCET measurement criteria. COMPARISON: CTA neck dated 05/20/2022 FINDINGS: Aortic arch and great vessels: There is a conventional left-sided 3 vessel arch.  The aortic arch is widely patent.  There is stable soft and calcified plaque in the proximal left subclavian artery with a similar appearance the prior study and possibly within radiation field but without critical stenosis or occlusion.  The right subclavian artery is patent.  The brachiocephalic trunk and origin of the left common carotid artery are patent. Carotid arteries Right carotid artery: There is calcified plaque scattered in the right common carotid artery without critical stenosis, occlusion or change.  There is no measurable stenosis of the internal carotid artery which is patent to the skull base. Left carotid artery: There is mild plaque scattered in the left common carotid artery without change and without critical stenosis or occlusion.  There is no measurable stenosis of the internal carotid artery. Vertebral arteries: The left vertebral artery is dominant and patent throughout its course in the neck.  The right vertebral artery is hypoplastic but patent.  There is no critical stenosis, occlusion, thrombus or dissection.  There is no change. The study extends intracranially.  There is calcified plaque in the V4 segment of the left  vertebral artery resulting in a moderate to marked short segment nonocclusive stenosis.  The right vertebral artery partially terminates in a posteroinferior cerebellar artery with a short segment moderate to marked stenosis of the very distal right vertebral artery.  Some flow within the distal right vertebral artery may represent retrograde flow from the dominant left vertebral artery.  The basilar artery is patent.  The origins of the superior cerebellar and posterior cerebral artery on the right are patent.  There is fetal origin of the left posterior cerebral artery which is patent. There is plaque in the cavernous segments of both internal carotid arteries but there is no critical stenosis or large vessel occlusion of the anterior circulation vessels.  There is no large aneurysm. Other: There is a similar appearance of the included upper lungs with pleural and parenchymal changes anteriorly in the upper lobes bilaterally.  As previously discussed, timing of the contrast bolus is performed during the arterial phase for CTA neck.  Therefore, enhancement of the soft tissues is somewhat suboptimal due to this technique. There has been a large region of soft tissue resection in the anterior mid and lower neck soft tissues with continued open defect in the upper chest and lower neck above the manubrium.  Soft tissue seen along the left posterolateral border of the trachea could represent secretions or mucus.  This was present previously. Redemonstrated is a large heterogeneously enhancing lesion centered at the level of the tongue base and floor of the mouth centrally into the left.  There is scattered calcifications.  The prior CTA demonstrated regions of central necrosis which are no longer definitely present but diffusely heterogeneous density and enhancement likely represents regions of necrosis.  This large heterogeneously enhancing soft tissue mass extends more anteriorly in the floor of the mouth on the left  and measures at least 5.6 cm in greatest anterior to posterior dimension with 3.6 cm transverse dimension.  This does extend anteriorly to the medial margin of the body of the mandible on the left. There are post treatment changes with stranding in the neck fat.     1. Similar appearance of the neck vessels when compared to the prior CTA neck with mild narrowing at the origin of the left subclavian artery.  There is no critical stenosis, occlusion, thrombosis or dissection involving the cervical vertebral or carotid arteries. 2. Larger mass within the hypopharynx and tongue base extending anteriorly to the floor of the mouth on the left to the medial margin of the body of the mandible without obvious bony destruction. 3. There is nonocclusive stenosis of the distal V4 segment of the left vertebral artery without change. 4. There is no large vessel occlusion or critical stenosis of the anterior circulation. 5. There is developmental variation with fetal origin of the left posterior cerebral artery. Electronically signed by: Rex Joyner MD Date:    09/20/2022 Time:    11:31    CT Abdomen Pelvis With Contrast    Result Date: 9/1/2022  CMS MANDATED QUALITY DATA - CT RADIATION - 436 All CT scans at this facility utilize dose modulation, iterative reconstruction, and/or weight based dosing when appropriate to reduce radiation dose to as low as reasonably achievable. Reason: abdominal pain partial history of laryngeal carcinoma TECHNIQUE: CT abdomen and pelvis with 100 mL Omnipaque 350. COMPARISON: PET/CT 5/13/2022 CT ABDOMEN: Coronary artery calcification and extremely tiny hiatal hernia incidentally noted. Visualized lung bases are clear. Liver, gallbladder, pancreas, spleen, adrenals, and kidneys are normal. Mild aortoiliac calcifications present. Gastrostomy tube tip lies in distal gastric body. Small intestines are unremarkable. Mild wall thickening affects the ascending colon with pneumatosis intestinalis  diffusely affecting the ascending colon. No other free intraperitoneal gas or, and remainder of colon is normal. A normal appendix is present. The major mesenteric vascular structures are patent. No acute osseous abnormality. CT PELVIS: Prostate is slightly enlarged. Bladder is normal. No free pelvic fluid. Tiny right and small to moderate left fat-containing inguinal hernias are present. No acute osseous abnormality. IMPRESSION: 1. Pneumatosis intestinalis affecting the ascending colon with associated mild wall thickening. Etiology of this is undetermined. Potential considerations include intestinal ischemia or pneumatosis related to chemotherapy. Clinical and laboratory correlation is needed to assess significance. No portal venous gas or free intraperitoneal air however. 2. Coronary artery calcifications Electronically signed by:  Nael Hui MD  9/1/2022 6:20 PM CDT Workstation: [id number]    NM PET CT Routine Skull to Mid Thigh    Result Date: 9/27/2022  PET CT WITH IMAGE FUSION HISTORY:  restage tongue cancer RESTAGING...  HX: TONGUE CA.  DX: April 2022.  LAST CHEMO TREATMENT WAS 3WKS AGO.  EVALUATE TX RESPONSE.   ALSO HX OF LARYNGEAL CA IN AUGUST 2020.  MULTIPLE SURGERIES: LARYNGECTOMY, THYROIDECTOMY & TRACHEOSTOMY.  RAD TX WAS COMPLETED IN NOV 2020. TECHNIQUE: Following IV administration of 11.2 mCi of F-18 labeled FDG into right antecubital fossa and a 60 minute delay, PET CT was performed from the vertex of the skull through the proximal thighs with an integrated PET CT scanner with image fusion. CT images were obtained to aid in attenuation correction and PET localization. The patient's serum glucose at the time of the exam was 136 mg/dL. COMPARISON: PET/CT 5/13/2022 FINDINGS: Poorly characterized soft tissue mass involving base of tongue approximately measures 4.3 x 2.8 cm (series 3 image 65) appearing similar to the prior exam. This reaches current max SUV of 11.8, not significantly changed from prior  of 11.4. No other abnormal FDG activity. Intracranial compartment is unremarkable. Trace bilateral maxillary sinus mucosal thickening is present. Postoperative changes of laryngectomy and tracheostomy are present. Surgical clips occur throughout the neck. No definite enlarged cervical or supraclavicular lymph node, with evaluation limited by lack of IV contrast. Left subclavian port catheter tip terminates in SVC. Diffuse coronary artery calcifications are present. No enlarged mediastinal or axillary lymph nodes. No pulmonary nodule or mass. Gastrostomy tube tip lies in gastric body. Moderate aortoiliac calcifications are present. Small fat-containing left inguinal hernia incidentally noted. Mild degenerative changes affect the spine. No suspicious osseous abnormality. IMPRESSION: 1. No significant change in the FDG avid mass involving the tongue base, remaining characteristic of malignancy. 2. No new FDG avid malignancy or metastatic disease. Electronically signed by:  Nael Hui MD  9/27/2022 2:38 PM CDT Workstation: [id number]    CT Abdomen Pelvis  Without Contrast    Result Date: 9/4/2022  CMS MANDATED QUALITY DATA - CT RADIATION  436 All CT scans at this facility utilize dose modulation, iterative reconstruction, and/or weight based dosing when appropriate to reduce radiation dose to as low as reasonably achievable. CT ABDOMEN PELVIS WITHOUT IV CONTRAST CLINICAL HISTORY: 72 years Male Follow-up pneumatosis COMPARISON: CT abdomen and pelvis September 1, 2022 FINDINGS: Imaging through the lower thorax demonstrates subsegmental atelectasis of the dependent lower lobes. Coronary artery calcification. Bone window images show no acute or aggressive osseous abnormality. Transitional lumbosacral vertebral body. On this unenhanced exam, no focal hepatic lesion. Gallbladder and biliary tree are unremarkable. Spleen appears normal. Pancreas is unremarkable. No adrenal lesion. No renal calculi or hydronephrosis. Ureters  are normal in caliber. Urinary bladder is within normal limits. Gastrostomy tube is in place within the stomach. Stomach is largely collapsed. No evidence of small bowel obstruction. Pneumatosis and pericolonic abscess involving the ascending colon is redemonstrated, slightly diminished compared to prior. Mild wall thickening throughout the colon. No free fluid or free air within the abdomen or pelvis. Aortoiliac atherosclerotic calcification. No pathologically enlarged lymph nodes within the abdomen or pelvis. Bilateral fat-containing inguinal hernias, larger on the left. IMPRESSION: Pneumatosis and pericolonic gas about the ascending colon has decreased in volume compared to prior. Persistent colonic wall thickening which could indicate colitis. Coronary and aortic atherosclerosis. Gastrostomy tube is within the stomach. Bilateral inguinal hernias. Electronically signed by:  Jose De Jesus Oleary MD  9/4/2022 4:06 PM CDT Workstation: [id number]    CTA neck  5/20/2022:     IMPRESSION:  Persistent mass within the hypopharynx consistent with malignancy.  By my measurements it is larger than on April 24, 2022 with central necrosis.  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 This is similar to the previous April 2022 study.  No evidence of arterial compromise related to malignancy.     PET 5/13/2022:     IMPRESSION:  1. Postoperative changes of prior laryngectomy and lymph node resection/radiation.  2. Masslike FDG avid lesion to the left of midline at the tongue base concerning for residual/recurrent disease.  3. Adjacent confluent nodular extension along the inferior left side of the mass.  4. No FDG avid lymphadenopathy or convincing additional sites of disease in the chest, abdomen or pelvis.     CT head 5/10/2022:  FINDINGS: Comparison to multiple prior exams. There is no acute intracranial hemorrhage, with no mass effect or abnormal extra-axial fluid. Mild  scattered areas of nonspecific hypoattenuation involve the deep periventricular white matter, with gray-white differentiation maintained.     There is mild generalized prominence of the cortical sulci and ventricles. The cerebellum and brainstem are unremarkable. There are carotid siphon vascular calcifications. The visualized paranasal sinuses and mastoid air cells are clear. There is no acute calvarial fracture or scalp hematoma.     IMPRESSION: No acute intracranial hemorrhage or acute calvarial fracture     CT soft neck 4/24/2022;     Oral tongue/tongue base:  At the base of the tongue on the left there are findings of a heterogeneously enhancing mass measuring 2.1 cm highly concerning for a neoplastic process.     True and false cords:  Patient status post laryngectomy which has been performed in the interim. Soft tissue stranding in the lower neck likely related to a previous flat reconstruction. No enhancement or lymphadenopathy within the lower neck.     Lymph node assessment:Negative     Surrounding soft tissues:  Negative     Vasculature:  Negative     Osseous structures:  Negative     Lung apices:  Scarring involving the lung apices bilaterally likely related to previous radiation.     IMPRESSION:  1. Postoperative and radiation changes involving the lower neck.  2. Findings highly concerning for a developing mass at the left base of the tongue enhancing 2.1 cm region. Direct visualization in this region would be of benefit.        CT soft neck  12/24/2021  IMPRESSION:  1.  Laryngeal mass as on prior exam. Laryngeal airway narrowing has not changed.  2.  No evidence for active hemorrhage.  3.  Partially visualized patchy groundglass infiltrates in both upper lobes. Also see CT chest report     CTA Chest 12/24/2021:  IMPRESSION:     1.  No pulmonary embolism.     2.  Soft tissue stranding in the region of the strap musculature with ill-defined hypodensity measuring 2.8 x 1.4 cm in the cervical midline.   Findings concerning for infectious process or abscess. Neoplastic process could have similar imaging characteristics.     3.  Suggested erosion of the proximal right parasymphyseal aspect of the thyroid shield.  Correlated with CT soft tissue neck findings. Findings could represent osteomyelitis. Neoplastic process cannot be excluded.     4.  Hepatic steatosis.  5.  Thickening of the gastric wall. Prominence of perigastric vasculature. Small hiatal hernia.     6.  Moderate stool burden.  Correlate for constipation.        PET 12/15/2021:  IMPRESSION:     Substantial qualitative and quantitative increase of hypermetabolic FDG activity associated with the larynx in this patient with known supraglottic neoplasm.     No evidence of FDG avid metastatic disease involving neck, chest, abdomen or pelvis.     PET 8/21/2020:     Impression:     1. Intensely hypermetabolic plaque-like mass along the left true vocal cord, compatible with known laryngeal carcinoma.  2. No findings of regional metastatic disease in the neck, or distant metastatic disease.        CT Chest 8/10/2020:     IMPRESSION:     No metastatic disease within the chest.  Three-vessel coronary artery calcification. Nondilated cardiac  chambers     CT Soft neck 7/22/2020:  IMPRESSION:  1. Slight interval increase in size of the previously described  enhancing nodule along the anterior left vocal cord.  2. No pathologic lymphadenopathy.  3. Additional and incidental findings as noted above.     CT Soft neck  3/16/2020:     Impression:     6 mm enhancing focus involving the superior aspect of the left focal cord near the anterior commisure.  Recommend direct visualization for further evaluation of laryngeal polyp     Question of 2 mm submucosal lipoma involving the midportion of the superior aspect of the left vocal cord.     Mild arteriosclerosis involving the brachiocephalic arteries and carotid arteries     Mild degenerative change of the cervical spine         I have reviewed all available lab results and radiology reports.    Assessment/Plan:   (1) 74 y.o. 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     - Patient has been under the care of Dr Khoobehi with [facility] and Dr Portillo with rad/onc.   - He was recently found to have a new primary cancer involving the base of his tongue in April 2022. He was deemed not to be a candidate for any further radiation and did not want to undergo any further surgery as this would necessitate a glossectomy procedure.   - He has been on adjuvant chemotherapy per direction of Dr Khoobehi and has had 3 cycles. His therapy course has been complicated with persistent diarrhea and N/V.      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 He saw Dr Lucero with Rad/onc this am. [place] [facility] [place] aJmes about surgical options. If he is not a surgical candidate then will proceed with cisplatin and XRT combine therapy.     10/6/2022:  - He saw Dr Lucero with Rad/onc, and Dr James and his [place] [facility] [facility] [place]  he general consensus was to proceed to surgery.  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   - He had his portacath removed on 11/28.   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, keratinizing type, moderate to poorly differentiated tumor  - Four LN's were all negative  - he did have a positive left superior pharyngeal margin  - pT3 pN0  - reviewed the latest NCCN guidelines again from Version 1.2023; he may need some further systemic therapy due to the margin  - check on Rad/onc follow-up     1/23/2023:  - s/p new portacath placed on 1/12/2023 with Dr Le  - starting combined chemotherapy and XRT - cisplatin  - s/p chemotherapy school with [facility]    2/6/2023:  - he seems to be tolerating the chemotherapy well at this time  - continued with concomitant therapy with XRT    2/22/2023:  - he seems to be tolerating the chemotherapy well at this time  - continued with concomitant therapy with XRT  - labs relatively adequate  - will check on his refills    3/6/2023:  - finishing up XRT this comking Thursday  - last chemotherapy today    4/3/2023:  - He has since completed chemotherapy and  XRT.  He seems to have tolerated the regimen fairly well with no new complaints.  Some weight loss but reports he is staying hydrated. He does have some occasional GRIFFIN.   - He is seeing ENT tomorrow with Dr James    5/31/2023:  - He has since completed chemotherapy and  XRT.  He seems to have tolerated the regimen fairly well . He saw Dr James with ENT on 5/2/2023 and had repeat scope with good report per patient.  - He had recent PET yesterday on 5/30 which overall looks adequate    8/23/2023:  - sees Dr James again on 9/5/2023  - will set up repeat CT neck and chest for Sept 2023  - reach out to dietary about foods he can eat that are higher in iron  - consider IV iron x2  (he can not swallow pills)  - see if we can renew PT    11/15/2023:  - He previously had completed chemotherapy and  XRT.  He seems to have tolerated the regimen fairly well . He saw Dr James with ENT on 11/72023 and had repeat scope with good report per patient.  - He last saw Dr Patterson on 8/3/2023 and sees him again in Jan 2024, Dr Portillo on 6/19/2023  - He had last PET on 5/30  and CT Chest/Neck in Sept 2023  - he missed one IV iron  - due for up to date labs    2/20/2024:  - He saw Dr James with ENT 2/6/2024 with DL with good report per patient. He sees him again in 3 months  - He saw Dr Patterson on 2/5/2024  - He had PEt on 2/7/2024   - some residual thrombocytopenia post-chemotherapy - monitor for now as this may take some time to recover     5/20/2024:  - He saw Dr James with ENT 5/7/2024 with DL with good report per patient. He sees him again on 6/18/2024 for ear flush.   - He saw Dr Patterson on 5/10/2024; Dena Silveira NP with endocrine in April 2024  - he had tube exchange with Dr Cooper in April 2024  - still having some hypocalcemia issues  - he declined referral to podiatrist for his right ankle issues    8/20/2024:  - he recently saw Dr James and had repeat LS  - sees Dr James again in Sepot 2024  - will order f/u CT chest/neck  - mild anemia and mild thrombocytopenia - will continue to monitor    3/20/2025:  - He saw Dr James with ENT again on 3/3/ and had laryngoscope and he sees him again in 4 months  - He saw Briana Carey NP in Dec 2024  - He saw Dr Patterson in Nov 2024; he saw Dr Padilla with cardiology in Oct 2024; Dena Silveira NP with endocrine in April 2024  - He had CT scans on 2/28/2025     (2) Hx/of Laryngeal cancer in Aug 2020 stage II (cT2 N0)  - s/p radiation followed by bilateral neck dissection and total thyroidectomy     Brief Oncology Summary for his laryngeal CA hx:     Patient was noted to initially have a suspicious lesion on the left true vocal cord by laryngoscopy with Dr Xiao in  March 2020 with biopsy showing severe dysplastic changes without definitive invasive process. He had a CT scan on 3/16/2020 which showed a 6mm nodule on the left vocal cord. A repeat Ct scan four months later on 7/22/2020 showed the nodule enlarged to 1.4 x 1.1 cm in size. Patient was then seen by Dr Noel and underwent repeat scope in Aug 2020 who found a bulky deeply invading tumor which was debulked and the pathology coming back moderately differentiated SCCA without lymphovascular or perineural invasion. Hs case was subsequently presented to the tumor board and the consensus was to proceed with radiation. He completed XRT on 10/30/2020 and proceeded with regular laryngoscopy surveillance. He eventually required salvage laryngectomy and neck dissection with flap with Dr James on Jan 6th 2022. 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 Fifty lymph nodes were removed which were all negative. Pathology TNM was pT4a, pN0. Patient did not require any adjuvant therapy afterwards.      (2) HTN and hypercholesterolemia     (3) Paroxysmal atrial fibrillation, V tach - seen by Anne Ugarte NP     (4) DM II     (5) B12 deficiency      (6) Fatty liver disease, GERD           VISIT DIAGNOSES:      Larynx cancer    Tracheostomy in place    S/P laryngectomy    Malignant neoplasm of base of tongue    Iron deficiency anemia due to chronic blood loss    Chemotherapy-induced neutropenia    Laryngeal cancer    Iron deficiency anemia, unspecified iron deficiency anemia type    Secondary malignant neoplasm of other specified sites    Vitamin B12 deficiency    Fatty liver disease, nonalcoholic          PLAN:  continue with ENT and rad/onc f/u as directed by them respectively - sees Dr James again in 4 months    Continue to monitor labs and resume IV iron as needed   s/p chemotherapy school with Briana  - discussed the side-effect profile, provided literature and obtained consents  F/u Rad/onc follow-up as directed  Check labs monthly for now  F/u with PCP, ENT, etc  Dietician f/u on the tube feeds   Check on sooner f/u with Raoul with endocrinology for his calcium issues     RTC in 12 weeks with Elyssa and 6 months with myself  Fax note to  Bandar/Erika Lucero, Yesenia Gomez; Raoul Cooper       Discussion:     COVID-19 Discussion:     I had long discussion with patient and any applicable family about the COVID-19 coronavirus epidemic and the recommended precautions with regard to cancer and/or hematology patients. I have re-iterated the CDC recommendations for adequate hand washing, use of hand -like products, and coughing into elbow, etc. In addition, especially for our patients who are on chemotherapy and/or our otherwise immunocompromised patients, I have recommended avoidance of crowds, including movie theaters, restaurants, churches, etc. I have recommended avoidance of any sick or symptomatic family members and/or friends. I have also recommended avoidance of any raw and unwashed food products, and general avoidance of food items that have not been prepared by themselves. The patient has been asked to call us immediately with any symptom developments, issues, questions or other general concerns.         Pathology Discussion:     I reviewed and discussed the pathology report(s) and radiograph reports (if available) in as simple to understand and/or laymen's terms to the best of my ability. I had an indepth conversation with the patient and went over the patient's individual diagnosis based on the information that was currently available. I discussed the TNM staging process with regard to the patient's particular cancer type, and the calculated stage based on the currently available TNM data and literature. I discussed the available prognostic data with regard to the current staging  "information and how it relates to the prognosis of their particular neoplastic process.          NCCN Guidelines:     I discussed the available treatment option(s) in accordance with the latest literature from the NCCN Clinical Practice Guidelines for the patient's particular type of cancer disorder. The NCCN Guidelines provide a "document evidence-based (and) consensus-driven management" of the care of oncology patients. The treatment recommendations were made not only in accordance to the NCCN guidelines, but also factored in to account the patient's overall age, condition, performance status and their medical co-morbidities. I went over the risks and benefits of the the treatment options (if any could be made) with regard to their particular cancer type, their cancer stage, their age, and their co-morbidities.         Chemotherapy Discussion:      I discussed the available treatment option(s) in accordance with the latest/current national evidence-based guidelines (NCCN, UpToDate, NCI, ASCO, etc where applicable), their overall age/condition and their co-morbidities. I also went over the risks and benefits of the chemotherapy with regard to their particular cancer type, their cancer stage, their age/condition, and their co-morbidities. I provided literature on the chemotherapy regimen and discussed the chemotherapy side-effect profiles of the drug(s). I discussed the importance of compliance with obtaining and monitoring weekly lab work, and went over the potential hematopathology issues and risks with anemia, leucopenia and thrombocytopenia that can occur with chemotherapy. I discussed the potential risks of liver and kidney damage, which could be permanent and could necessitate dialysis long-term if kidney failure developed. I discussed the emetic and/or diarrheal potential of the regimen and the potential need for use of antiemetic and anti-diarrheal medications. I discussed the risk for development of " anaphylactic shock, bronchospasm, dysrhythmia, and respiratory/cardiovascular arrest and/or failure. I discussed the potential risks for development of alopecia, cold sensory issues, ringing in ears, vertigo, cataracts, glaucoma, and neuropathy, all of which could end up being chronic and life-long. Some chemotherpyI discussed the risks of hand-foot syndrome and rashes, and development of other autoimmune mediated processes such as pneumonitis, hepatitis, and colitis which could be life threatening. I discussed the risks of the potential development of a rare but fatal viral mediated disease known as PML (Progressive Multifocal Leukoencephalopathy), and risk of future development of leukemia and/or lymphoma from use of certain chemotherapy agents. I discussed the need for neutropenic precautions, basic hygiene/sanitation behaviors and dietary restrictions.    The patient's consent has been obtained to proceed with the chemotherapy.The patient will be referred to Chemotherapy School /[facility] for training and education on chemotherapy, use of antiemetics and/or anti-diarrheals, use of NSAID's, potential chemotherapy side-effects, and any specific recommendations and precautions with the particular chemotherapy agents.      I answered all of the patient's (and family's, if applicable) questions to the best of my ability and to their complete satisfaction. The patient acknowledged full understanding of the risks, recommendations and plan(s).     I have explained and the patient understands all of  the current recommendation(s). I have answered all of their questions to the best of my ability and to their complete satisfaction.         I spent over 25 mins of time with the patient. Reviewed results of the recently ordered labs, tests and studies; made directives with regards to the results. Over half of this time was spent couseling and coordinating care.    I have explained all of the above in detail and the  patient understands all of the current recommendation(s). I have answered all of their questions to the best of my ability and to their complete satisfaction.   The patient is to continue with the current management plan.            Electronically signed by Venu Tamez MD                        Answers submitted by the patient for this visit:  Review of Systems Questionnaire (Submitted on 3/18/2025)  appetite change : No  unexpected weight change: No  mouth sores: No  visual disturbance: No  cough: Yes  shortness of breath: No  chest pain: No  abdominal pain: No  diarrhea: No  frequency: No  back pain: No  rash: No  headaches: No  adenopathy: No  nervous/ anxious: No

## 2025-05-06 ENCOUNTER — INFUSION (OUTPATIENT)
Dept: INFUSION THERAPY | Facility: HOSPITAL | Age: 75
End: 2025-05-06
Attending: INTERNAL MEDICINE
Payer: MEDICARE

## 2025-05-06 VITALS
HEART RATE: 70 BPM | BODY MASS INDEX: 24.51 KG/M2 | DIASTOLIC BLOOD PRESSURE: 69 MMHG | TEMPERATURE: 97 F | HEIGHT: 66 IN | SYSTOLIC BLOOD PRESSURE: 116 MMHG | WEIGHT: 152.5 LBS | OXYGEN SATURATION: 99 % | RESPIRATION RATE: 18 BRPM

## 2025-05-06 DIAGNOSIS — C32.9 LARYNX CANCER: Primary | ICD-10-CM

## 2025-05-06 DIAGNOSIS — E86.0 DEHYDRATION: ICD-10-CM

## 2025-05-06 DIAGNOSIS — C01 MALIGNANT NEOPLASM OF BASE OF TONGUE: ICD-10-CM

## 2025-05-06 PROCEDURE — 96523 IRRIG DRUG DELIVERY DEVICE: CPT

## 2025-05-06 PROCEDURE — 25000003 PHARM REV CODE 250: Performed by: INTERNAL MEDICINE

## 2025-05-06 PROCEDURE — 63600175 PHARM REV CODE 636 W HCPCS: Performed by: INTERNAL MEDICINE

## 2025-05-06 PROCEDURE — A4216 STERILE WATER/SALINE, 10 ML: HCPCS | Performed by: INTERNAL MEDICINE

## 2025-05-06 RX ORDER — SODIUM CHLORIDE 0.9 % (FLUSH) 0.9 %
10 SYRINGE (ML) INJECTION
Status: DISCONTINUED | OUTPATIENT
Start: 2025-05-06 | End: 2025-05-06 | Stop reason: HOSPADM

## 2025-05-06 RX ORDER — HEPARIN 100 UNIT/ML
500 SYRINGE INTRAVENOUS
Status: DISCONTINUED | OUTPATIENT
Start: 2025-05-06 | End: 2025-05-06 | Stop reason: HOSPADM

## 2025-05-06 RX ORDER — HEPARIN 100 UNIT/ML
500 SYRINGE INTRAVENOUS
OUTPATIENT
Start: 2025-05-06

## 2025-05-06 RX ORDER — SODIUM CHLORIDE 0.9 % (FLUSH) 0.9 %
10 SYRINGE (ML) INJECTION
OUTPATIENT
Start: 2025-05-06

## 2025-05-06 RX ADMIN — HEPARIN 500 UNITS: 100 SYRINGE at 01:05

## 2025-05-06 RX ADMIN — Medication 10 ML: at 01:05

## 2025-05-10 NOTE — PROGRESS NOTES
Subjective:       Patient ID: Cyrus Batres Jr. is a 74 y.o. male.    Chief Complaint: Thyroid Problem, Cough,   History of throat and tongue cancer, and  presence PEG tube    History of Present Illness    CHIEF COMPLAINT:  Cyrus, a 74-year-old gentleman, presents for follow-up of his laryngeal and tongue cancer, and to discuss recent thyroid lab results.    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 Cyrus reports that his tracheostomy tube is functioning well and he is generally doing adequately. He has been following up with ENT Dr. Jang for his cancer-related care.    Cyrus has ongoing cough and congestion, for which he regularly takes promethazine DM. The cough syrup helps with the congestion but he continues to cough. Cyrus denies feeling drowsy from the cough syrup.    Cyrus receives nutrition through a PEG tube and reports that he only consumes formula, unable to take anything orally. His platelet count is consistently low.    Cyrus also notes that his blood sugars have been slightly elevated recently. He denies any weight changes, stating he is maintaining his weight adequately.    MEDICATIONS:  Cyrus is on Levothyroxine 100 mcg daily via PEG tube for thyroid management. He is also receiving calcium supplements via PEG tube. For cough and congestion, he is on Promethazine DM. Cyrus receives formula via feeding tube for nutrition.    MEDICAL HISTORY:  Cyrus has a history of laryngeal cancer, tongue cancer, and hypothyroidism. Cyrus received a flu vaccine last year and a pneumonia vaccine 5 years ago.    SURGICAL HISTORY:  Cyrus underwent laryngeal surgery for laryngeal cancer. As a result of the surgery, he is aphonic and has a tracheostomy.    TEST RESULTS:  Cyrus's recent TSH level was 0.724, which is within range,  and his Free T4 was 0.98, which is normal. A previous TSH reading of 0.024 could have indicated treatment. His last blood sugar test showed slightly elevated levels. Cyrus's platelet count is low.      ROS:  General: -fever, -chills, -fatigue, -weight gain, -weight loss  Cardiovascular: -chest pain, -palpitations, -lower extremity edema  Respiratory: +cough, -shortness of breath, +chest congestion  Gastrointestinal: -abdominal pain, -nausea, -vomiting, -diarrhea, -constipation, -blood in stool, +difficulty swallowing  Skin: -rash, -lesion  Neurological: -headache, -dizziness, -numbness, -tingling  Throat: +hoarseness  Lymphatics: +easy bruising, +easy or excessive bleeding         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    Tongue cancer     Tracheostomy dependence     Type 2 diabetes mellitus, without long-term current use of insulin 11/22/2022    Unspecified glaucoma      Social History[1]  Past Surgical History:   Procedure Laterality Date    CATARACT EXTRACTION W/  INTRAOCULAR LENS IMPLANT Right 8/16/2023    Procedure: CEIOL OD  NPO-peg;  Surgeon: Stoney Munoz MD;  Location: [facility] OR;  Service: Ophthalmology;  Laterality: Right;    CATARACT EXTRACTION W/  INTRAOCULAR LENS IMPLANT Left 10/18/2023    Procedure: CEIOL OS npo peg;  Surgeon: Stoney Munoz MD;  Location: [facility] OR;  Service: Ophthalmology;  Laterality: Left;    DIRECT  LARYNGOBRONCHOSCOPY N/A 12/27/2021    Procedure: LARYNGOSCOPY, DIRECT, WITH BRONCHOSCOPY;  Surgeon: Flex Espinosa MD;  Location: [facility] OR [facility] FLR;  Service: ENT;  Laterality: N/A;    DIRECT LARYNGOSCOPY  11/9/2022    Procedure: LARYNGOSCOPY, DIRECT;  Surgeon: Jesse James MD;  [facility];  Service: ENT;;    DISSECTION OF NECK Bilateral 1/6/2022    Procedure: DISSECTION, NECK;  Surgeon: Jesse James MD;  Location: [facility] OR [facility] FLR;  Service: ENT;  Laterality: Bilateral;    DISSECTION OF NECK Bilateral 11/9/2022    Procedure: DISSECTION, NECK;  Surgeon: Jesse James MD;  [facility];  Service: ENT;  Laterality: Bilateral;    ESOPHAGOGASTRODUODENOSCOPY N/A 4/9/2024    Procedure: EGD (ESOPHAGOGASTRODUODENOSCOPY);  Surgeon: Matheus Cooper III, MD;  Location: [facility];  Service: Endoscopy;  Laterality: N/A;    FLAP PROCEDURE Right 1/6/2022    Procedure: CREATION, FREE FLAP;  Surgeon: Alise Hart MD;  Location: [facility] OR [facility] FLR;  Service: ENT;  Laterality: Right;  Ischemic start 1351  Ischemic stop 1502    FLAP PROCEDURE Left 11/9/2022    Procedure: CREATION, FREE FLAP, ALT;  Surgeon: Alise Hart MD;  [facility];  Service: ENT;  Laterality: Left;    GLOSSECTOMY Bilateral 11/9/2022    Procedure: TOTAL GLOSSECTOMY;  Surgeon: Jesse James MD;  [facility];  Service: ENT;  Laterality: Bilateral;    INSERTION OF TUNNELED CENTRAL VENOUS CATHETER (CVC) WITH SUBCUTANEOUS PORT N/A 6/9/2022    Procedure: [id number];  Surgeon: Jesus Viera MD;  Location: [facility] OR;  Service: General;  Laterality: N/A;    INSERTION OF TUNNELED CENTRAL VENOUS CATHETER (CVC) WITH SUBCUTANEOUS PORT Right 1/12/2023    Procedure: [id number]-[id number];  Surgeon: Abdulaziz Le Jr., MD;  Location: [facility] OR;  Service: General;  Laterality: Right;    LARYNGECTOMY N/A 1/6/2022    Procedure: LARYNGECTOMY;  Surgeon: Jesse James MD;   Location: [facility] OR [facility] FLR;  Service: ENT;  Laterality: N/A;    LARYNGOSCOPY N/A 8/4/2020    Procedure: Suspension microlaryngoscopy with biopsy, possible KTP laser treatment/excision;  Surgeon: Stew Noel MD;  [facility];  Service: ENT;  Laterality: N/A;  Microscope, telescopes, tower, microinstruments, KTP laser, rep conf# [id number] IC 7/28.    LARYNGOSCOPY N/A 3/16/2021    Procedure: Suspension microlaryngoscopy with excision of lesion, possible CO2 laser;  Surgeon: Stew Noel MD;  [facility];  Service: ENT;  Laterality: N/A;  Microscope, telescopes, tower, microinstruments, CO2 laser, rep conf# [id number] IC 3/4.    LARYNGOSCOPY N/A 4/1/2021    Procedure: Suspension microlaryngoscopy with KTP laser excision of lesion;  Surgeon: Stew Neol MD;  [facility];  Service: ENT;  Laterality: N/A;  Microscope, telescopes, tower, microinstruments, 70 degree scope, vocal fold , KTP laser, rep conf# [id number] BC    LARYNGOSCOPY N/A 12/9/2021    Procedure: Suspension microlaryngoscopy with biopsy;  Surgeon: Stew Noel MD;  Location: [facility] OR [address] [place] [address];  Service: ENT;  Laterality: N/A;  Microscope, telescopes, tower, microinstruments    LARYNGOSCOPY N/A 1/6/2022    Procedure: LARYNGOSCOPY;  Surgeon: Jesse James MD;  [facility];  Service: ENT;  Laterality: N/A;    LARYNGOSCOPY N/A 4/27/2022    Procedure: LARYNGOSCOPY WITH BIOPSY;  Surgeon: Jesse James MD;  [facility];  Service: ENT;  Laterality: N/A;    MICROLARYNGOSCOPY N/A 3/17/2020    Procedure: MICROLARYNGOSCOPY;  Surgeon: Jung Xiao MD;  Location: [facility];  Service: ENT;  Laterality: N/A;  Laser Microlaryngoscopy  NEED TO SCHEDULE LASER from [facility] [id number]    PHARYNGECTOMY  11/9/2022    Procedure: TOTAL PHARYNGECTOMY;  Surgeon: Jesse James MD;  Location: [facility] OR [address] [place] [address];  Service: ENT;;    REIMPLANTATION OF PARATHYROID  "TISSUE N/A 1/6/2022    Procedure: REIMPLANTATION, PARATHYROID TISSUE;  Surgeon: Jesse James MD;  Location: [facility] OR [facility] FLR;  Service: ENT;  Laterality: N/A;    SKIN SPLIT GRAFT Right 11/9/2022    Procedure: APPLICATION, GRAFT, SKIN, SPLIT-THICKNESS;  Surgeon: Jesse James MD;  Location: [facility] OR 2ND FLR;  Service: ENT;  Laterality: Right;    THYROIDECTOMY  1/6/2022    Procedure: THYROIDECTOMY;  Surgeon: Jesse James MD;  Location: [facility] OR [facility] FLR;  Service: ENT;;    TRACHEOSTOMY N/A 12/27/2021    Procedure: CREATION, TRACHEOSTOMY;  Surgeon: Flex Espinosa MD;  Location: [facility] FLR;  Service: ENT;  Laterality: N/A;     Family History   Problem Relation Name Age of Onset    Abnormal EKG Mother Hayley     Diabetes Father Nicolás     Heart disease Father Nicolás     Hypertension Father Nicolás        Objective:      Patient uses a smart phone to type and read his communications.  Physical Exam  Blood pressure 126/71, pulse 87, height 5' 6" (1.676 m), weight 68 kg (149 lb 14.6 oz). Body mass index is 24.2 kg/m².  Physical Exam    Vitals: Blood pressure: 126/71.  General: chronically unwell but in no distress  Eyes: EOMI. Sclerae anicteric.   Neck tracheostomy.  Scar with dressing closure  Cardiovascular: Regular rate. Regular rhythm. No murmurs. No rubs. No gallops. Normal S1, S2.  Respiratory: Normal respiratory effort. Clear to auscultation   bilaterally. No rales. No rhonchi. No wheezing.  Abdominal PEG tube  Musculoskeletal: No  obvious deformity.  Extremities: No lower extremity edema.  Neurological: Alert   Psychiatric:  preserved sense of well-being though sometimes somewhat delayed communication due to aphonia   Maybe frustrating.  Skin: Warm. Dry.              Assessment:       Lab Visit on 05/12/2025   Component Date Value Ref Range Status    TSH 05/12/2025 0.724  0.340 - 5.600 uIU/mL Final    Free T4 05/12/2025 0.98  0.71 - 1.51 ng/dL Final    Urine Microalbumin 05/12/2025 83.1 " (H)  <=19.8 ug/mL Final    Urine Creatinine 05/12/2025 57.3  23.0 - 375.0 mg/dL Final    Microalbumin/Creatinine Ratio Urine 05/12/2025 145.0 (H)  <=30.0 ug/mg Final    Hemoglobin A1c 05/12/2025 6.5 (H)  4.5 - 6.2 % Final    Estimated Average Glucose 05/12/2025 140 (H)  68 - 131 mg/dL Final   Lab Visit on 03/14/2025   Component Date Value Ref Range Status    WBC 03/14/2025 5.17  3.90 - 12.70 K/uL Final    RBC 03/14/2025 4.30 (L)  4.60 - 6.20 M/uL Final    Hemoglobin 03/14/2025 12.9 (L)  14.0 - 18.0 g/dL Final    Hematocrit 03/14/2025 38.6 (L)  40.0 - 54.0 % Final    MCV 03/14/2025 90  82 - 98 fL Final    MCH 03/14/2025 30.0  27.0 - 31.0 pg Final    MCHC 03/14/2025 33.4  32.0 - 36.0 g/dL Final    RDW 03/14/2025 13.6  11.5 - 14.5 % Final    Platelets 03/14/2025 115 (L)  150 - 450 K/uL Final    MPV 03/14/2025 12.3  9.2 - 12.9 fL Final    Immature Granulocytes 03/14/2025 1.0 (H)  0.0 - 0.5 % Final    Gran # (ANC) 03/14/2025 3.8  1.8 - 7.7 K/uL Final    Immature Grans (Abs) 03/14/2025 0.05 (H)  0.00 - 0.04 K/uL Final    Lymph # 03/14/2025 0.7 (L)  1.0 - 4.8 K/uL Final    Mono # 03/14/2025 0.4  0.3 - 1.0 K/uL Final    Eos # 03/14/2025 0.2  0.0 - 0.5 K/uL Final    Baso # 03/14/2025 0.01  0.00 - 0.20 K/uL Final    nRBC 03/14/2025 0  0 /100 WBC Final    Gran % 03/14/2025 73.1 (H)  38.0 - 73.0 % Final    Lymph % 03/14/2025 14.1 (L)  18.0 - 48.0 % Final    Mono % 03/14/2025 7.5  4.0 - 15.0 % Final    Eosinophil % 03/14/2025 4.1  0.0 - 8.0 % Final    Basophil % 03/14/2025 0.2  0.0 - 1.9 % Final    Differential Method 03/14/2025 Automated   Final    Sodium 03/14/2025 132 (L)  136 - 145 mmol/L Final    Potassium 03/14/2025 4.2  3.5 - 5.1 mmol/L Final    Chloride 03/14/2025 99  95 - 110 mmol/L Final    CO2 03/14/2025 26  23 - 29 mmol/L Final    Glucose 03/14/2025 141 (H)  70 - 110 mg/dL Final    BUN 03/14/2025 23  8 - 23 mg/dL Final    Creatinine 03/14/2025 1.0  0.5 - 1.4 mg/dL Final    Calcium 03/14/2025 8.6 (L)  8.7 - 10.5 mg/dL  Final    Total Protein 03/14/2025 6.8  6.0 - 8.4 g/dL Final    Albumin 03/14/2025 4.3  3.5 - 5.2 g/dL Final    Total Bilirubin 03/14/2025 0.9  0.1 - 1.0 mg/dL Final    Alkaline Phosphatase 03/14/2025 155 (H)  55 - 135 U/L Final    AST 03/14/2025 32  10 - 40 U/L Final    ALT 03/14/2025 34  10 - 44 U/L Final    eGFR 03/14/2025 >60.0  >60 mL/min/1.73 m^2 Final    Anion Gap 03/14/2025 7 (L)  8 - 16 mmol/L Final    Iron 03/14/2025 73  45 - 160 ug/dL Final    Transferrin 03/14/2025 229  200 - 375 mg/dL Final    TIBC 03/14/2025 321  250 - 450 ug/dL Final    Saturated Iron 03/14/2025 23  20 - 50 % Final    Ferritin 03/14/2025 73.9  20.0 - 300.0 ng/mL Final   Lab Visit on 02/14/2025   Component Date Value Ref Range Status    WBC 02/14/2025 5.25  3.90 - 12.70 K/uL Final    RBC 02/14/2025 4.26 (L)  4.60 - 6.20 M/uL Final    Hemoglobin 02/14/2025 12.7 (L)  14.0 - 18.0 g/dL Final    Hematocrit 02/14/2025 38.2 (L)  40.0 - 54.0 % Final    MCV 02/14/2025 90  82 - 98 fL Final    MCH 02/14/2025 29.8  27.0 - 31.0 pg Final    MCHC 02/14/2025 33.2  32.0 - 36.0 g/dL Final    RDW 02/14/2025 13.1  11.5 - 14.5 % Final    Platelets 02/14/2025 122 (L)  150 - 450 K/uL Final    MPV 02/14/2025 11.8  9.2 - 12.9 fL Final    Immature Granulocytes 02/14/2025 0.6 (H)  0.0 - 0.5 % Final    Gran # (ANC) 02/14/2025 3.7  1.8 - 7.7 K/uL Final    Immature Grans (Abs) 02/14/2025 0.03  0.00 - 0.04 K/uL Final    Lymph # 02/14/2025 0.7 (L)  1.0 - 4.8 K/uL Final    Mono # 02/14/2025 0.4  0.3 - 1.0 K/uL Final    Eos # 02/14/2025 0.4  0.0 - 0.5 K/uL Final    Baso # 02/14/2025 0.02  0.00 - 0.20 K/uL Final    nRBC 02/14/2025 0  0 /100 WBC Final    Gran % 02/14/2025 70.5  38.0 - 73.0 % Final    Lymph % 02/14/2025 13.3 (L)  18.0 - 48.0 % Final    Mono % 02/14/2025 8.0  4.0 - 15.0 % Final    Eosinophil % 02/14/2025 7.2  0.0 - 8.0 % Final    Basophil % 02/14/2025 0.4  0.0 - 1.9 % Final    Differential Method 02/14/2025 Automated   Final    Sodium 02/14/2025 132 (L)   136 - 145 mmol/L Final    Potassium 02/14/2025 4.1  3.5 - 5.1 mmol/L Final    Chloride 02/14/2025 98  95 - 110 mmol/L Final    CO2 02/14/2025 27  23 - 29 mmol/L Final    Glucose 02/14/2025 110  70 - 110 mg/dL Final    BUN 02/14/2025 20  8 - 23 mg/dL Final    Creatinine 02/14/2025 0.9  0.5 - 1.4 mg/dL Final    Calcium 02/14/2025 8.8  8.7 - 10.5 mg/dL Final    Total Protein 02/14/2025 7.0  6.0 - 8.4 g/dL Final    Albumin 02/14/2025 4.3  3.5 - 5.2 g/dL Final    Total Bilirubin 02/14/2025 1.0  0.1 - 1.0 mg/dL Final    Alkaline Phosphatase 02/14/2025 135  55 - 135 U/L Final    AST 02/14/2025 25  10 - 40 U/L Final    ALT 02/14/2025 24  10 - 44 U/L Final    eGFR 02/14/2025 >60.0  >60 mL/min/1.73 m^2 Final    Anion Gap 02/14/2025 7 (L)  8 - 16 mmol/L Final    Iron 02/14/2025 57  45 - 160 ug/dL Final    Transferrin 02/14/2025 223  200 - 375 mg/dL Final    TIBC 02/14/2025 312  250 - 450 ug/dL Final    Saturated Iron 02/14/2025 18 (L)  20 - 50 % Final    Ferritin 02/14/2025 105.6  20.0 - 300.0 ng/mL Final       Assessment & Plan    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  - Noted aphonia secondary to tracheostomy status.  - BP: 126/71.  - Evaluated cough symptoms and current management.  - Noted slightly elevated glucose levels from previous tests.  - Observed low platelet count.    E11.618, Z79.4 TYPE 2 DIABETES MELLITUS WITH OTHER DIABETIC ARTHROPATHY:  - Monitored patient's glucose levels, which were elevated at last check.  - Ordered glucose test to be performed with  other labs.  - Discussed the importance of regular blood sugar monitoring.  - Cyrus is currently taking 100 mcg of levothyroxine via PEG tube.    C32.9 MALIGNANT NEOPLASM OF LARYNX, UNSPECIFIED:  - Monitored the patient's history of laryngeal cancer.  - Cyrus is following up with ENT specialist Dr. Jang.    C02.9 MALIGNANT NEOPLASM OF TONGUE, UNSPECIFIED:  - Monitored the patient's history of tongue cancer.    Z90.02 ACQUIRED ABSENCE OF LARYNX:  - Monitored the patient's status post laryngeal surgery.  - Consider consultation with ENT for additional cough management suggestions.    Z93.0 TRACHEOSTOMY STATUS:  - Noted the patient communicates through an electronic chalkboard due to tracheostomy status.    R49.1 APHONIA:  - Monitored the patient's aphonia secondary to tracheostomy and laryngeal surgery.    E03.9 HYPOTHYROIDISM, UNSPECIFIED:  - Monitored thyroid function tests: Current TSH within range at 0.724 and free T4 normal at 0.98.  - Previous TSH was 0.024, which could have indicated treatment adjustment.    Z93.1 GASTROSTOMY STATUS:  - Administered supplements via the PEG tube.    R05.3 CHRONIC COUGH:  - Prescribed benzonatate (Tessalon Perles) 3 times daily for 10 days for cough management.  - Instructed patient to contact office if medication proves helpful.  - Also prescribed promethazine DM for cough (primarily for nighttime use but can be used during day if needed); patient reports it helps with congestion but still experiences coughing.  - Advised consultation with pulmonologist or ENT specialist for additional cough management strategies.    D69.6 THROMBOCYTOPENIA, UNSPECIFIED:  - Monitored the patient's platelet count, which is low.    R73.09 OTHER ABNORMAL GLUCOSE:  - Ordered glucose test to be performed today.         Plan:   Cough, unspecified type  Comments:  Cause of cough uncertain.  Wants 2 or 3 bottles of codeine cough syrup.  Salt water gargles, steam inhalation and promethazine DM  sparingly at night  Orders:  -     promethazine-dextromethorphan (PROMETHAZINE-DM) 6.25-15 mg/5 mL Syrp; Take 5 mLs by mouth every 12 (twelve) hours as needed (cough and cogestion).  Dispense: 300 mL; Refill: 5  -     benzonatate (TESSALON) 100 MG capsule; Take 1 capsule (100 mg total) by mouth 3 (three) times daily as needed for Cough. Take only as needed  Dispense: 30 capsule; Refill: 5    Rhinorrhea  Comments:  Possibly some postnasal drip and irritation in throat.  Orders:  -     promethazine-dextromethorphan (PROMETHAZINE-DM) 6.25-15 mg/5 mL Syrp; Take 5 mLs by mouth every 12 (twelve) hours as needed (cough and cogestion).  Dispense: 300 mL; Refill: 5    Larynx cancer  Comments:  History of larynx cancer status post surgery has been noted.  Orders:  -     promethazine-dextromethorphan (PROMETHAZINE-DM) 6.25-15 mg/5 mL Syrp; Take 5 mLs by mouth every 12 (twelve) hours as needed (cough and cogestion).  Dispense: 300 mL; Refill: 5  -     benzonatate (TESSALON) 100 MG capsule; Take 1 capsule (100 mg total) by mouth 3 (three) times daily as needed for Cough. Take only as needed  Dispense: 30 capsule; Refill: 5    Elevated blood sugar  Comments:  Elevated blood sugars were noted recently in past.  Check A1c level.  Goal of control of diabetes uncertain at this point. A1c level 6.5 which is borderline    Aphonia    Presence of externally removable percutaneous endoscopic gastrostomy (PEG) tube       *  Patient's conditions noted. A1c level is 6.5 thyroid test protein level is elevated.    Communicated to patient and check his interest on tighter control of diabetes      * Chronic cough noted.      * New prescription for cough syrup on a palliative  basis is   Given.     *   He requests more cough syrup.  He should check with ENT or Pulmonary for any  new modalities  to help  for his cough.  I will add Tessalon Perles.   At this point patient expects complete and full treatment and hence will not give ad meme cough  syrups with potential side effects.    Blood sugars have shown some elevation which he attributes to his   Direct PEG tube feeding. Check A1c level and decide accordingly   Various preventive care measures  pending including shingles vaccine  Follow up in about 6 months (around 11/12/2025), or if symptoms worsen or fail to improve, for cough ,blood sugar.    Current Medications[2]    This note was generated with the assistance of ambient listening technology. Verbal consent was obtained by the patient and accompanying visitor(s) for the recording of patient appointment to facilitate this note. I attest to having reviewed and edited the generated note for accuracy, though some syntax or spelling errors may persist. Please contact the author of this note for any clarification.      Maico Patterson           [1]   Social History  Socioeconomic History    Marital status:      Spouse name: Janel Batres    Number of children: 2   Occupational History    Occupation: [employer]     Employer: [employer]   Tobacco Use    Smoking status: Never    Smokeless tobacco: Never   Substance and Sexual Activity    Alcohol use: Not Currently     Comment: occasional    Drug use: No    Sexual activity: Yes     Partners: Female   Social History Narrative    ** Merged History Encounter **         2 children from his 1st wife     Social Drivers of Health     Financial Resource Strain: Low Risk  (2/2/2024)    Overall Financial Resource Strain (CARDIA)     Difficulty of Paying Living Expenses: Not hard at all   Food Insecurity: No Food Insecurity (2/2/2024)    Hunger Vital Sign     Worried About Running Out of Food in the Last Year: Never true     Ran Out of Food in the Last Year: Never true   Transportation Needs: No Transportation Needs (2/2/2024)    PRAPARE - Transportation     Lack of Transportation (Medical): No     Lack of Transportation (Non-Medical): No   Physical Activity: Insufficiently Active (2/2/2024)    Exercise Vital Sign      Days of Exercise per Week: 3 days     Minutes of Exercise per Session: 30 min   Stress: No Stress Concern Present (2/2/2024)    [employer] - Occupational Stress Questionnaire     Feeling of Stress : Not at all   Housing Stability: Low Risk  (2/2/2024)    Housing Stability Vital Sign     Unable to Pay for Housing in the Last Year: No     Number of Places Lived in the Last Year: 1     Unstable Housing in the Last Year: No   [2]   Current Outpatient Medications:     calcium carbonate 500 mg/5 mL (1,250 mg/5 mL), Take 20 mls by mouth four times daily with meals and nightly., Disp: 2300 mL, Rfl: 12    diclofenac sodium (VOLTAREN ARTHRITIS PAIN) 1 % Gel, Apply 2 g topically once daily. Apply couple of times a day on the affected arthritis pain., Disp: 100 g, Rfl: 1    levothyroxine (SYNTHROID) 100 MCG tablet, Take 1 tablet (100 mcg total) by mouth before breakfast., Disp: 90 tablet, Rfl: 3    benzonatate (TESSALON) 100 MG capsule, Take 1 capsule (100 mg total) by mouth 3 (three) times daily as needed for Cough. Take only as needed, Disp: 30 capsule, Rfl: 5    promethazine-dextromethorphan (PROMETHAZINE-DM) 6.25-15 mg/5 mL Syrp, Take 5 mLs by mouth every 12 (twelve) hours as needed (cough and cogestion)., Disp: 300 mL, Rfl: 5

## 2025-05-12 ENCOUNTER — PATIENT MESSAGE (OUTPATIENT)
Dept: FAMILY MEDICINE | Facility: CLINIC | Age: 75
End: 2025-05-12

## 2025-05-12 ENCOUNTER — LAB VISIT (OUTPATIENT)
Dept: LAB | Facility: HOSPITAL | Age: 75
End: 2025-05-12
Attending: INTERNAL MEDICINE
Payer: MEDICARE

## 2025-05-12 ENCOUNTER — OFFICE VISIT (OUTPATIENT)
Dept: FAMILY MEDICINE | Facility: CLINIC | Age: 75
End: 2025-05-12
Payer: MEDICARE

## 2025-05-12 ENCOUNTER — RESULTS FOLLOW-UP (OUTPATIENT)
Dept: FAMILY MEDICINE | Facility: CLINIC | Age: 75
End: 2025-05-12

## 2025-05-12 VITALS
BODY MASS INDEX: 24.1 KG/M2 | DIASTOLIC BLOOD PRESSURE: 71 MMHG | SYSTOLIC BLOOD PRESSURE: 126 MMHG | HEART RATE: 87 BPM | WEIGHT: 149.94 LBS | HEIGHT: 66 IN

## 2025-05-12 DIAGNOSIS — R05.9 COUGH, UNSPECIFIED TYPE: Primary | ICD-10-CM

## 2025-05-12 DIAGNOSIS — D69.6 THROMBOCYTOPENIA, UNSPECIFIED: ICD-10-CM

## 2025-05-12 DIAGNOSIS — R49.1 APHONIA: ICD-10-CM

## 2025-05-12 DIAGNOSIS — E11.9 TYPE 2 DIABETES MELLITUS WITHOUT COMPLICATION: ICD-10-CM

## 2025-05-12 DIAGNOSIS — D50.0 IRON DEFICIENCY ANEMIA DUE TO CHRONIC BLOOD LOSS: Primary | ICD-10-CM

## 2025-05-12 DIAGNOSIS — R73.9 BLOOD GLUCOSE ELEVATED: ICD-10-CM

## 2025-05-12 DIAGNOSIS — E89.0 POSTABLATIVE HYPOTHYROIDISM: ICD-10-CM

## 2025-05-12 DIAGNOSIS — E55.9 HYPOVITAMINOSIS D: ICD-10-CM

## 2025-05-12 DIAGNOSIS — C32.9 LARYNX CANCER: ICD-10-CM

## 2025-05-12 DIAGNOSIS — Z93.1 PRESENCE OF EXTERNALLY REMOVABLE PERCUTANEOUS ENDOSCOPIC GASTROSTOMY (PEG) TUBE: ICD-10-CM

## 2025-05-12 DIAGNOSIS — R73.9 ELEVATED BLOOD SUGAR: ICD-10-CM

## 2025-05-12 DIAGNOSIS — J34.89 RHINORRHEA: ICD-10-CM

## 2025-05-12 DIAGNOSIS — C01 MALIGNANT NEOPLASM OF BASE OF TONGUE: ICD-10-CM

## 2025-05-12 DIAGNOSIS — E89.2 POSTPROCEDURAL HYPOPARATHYROIDISM: ICD-10-CM

## 2025-05-12 DIAGNOSIS — N64.4 NIPPLE SORENESS: ICD-10-CM

## 2025-05-12 DIAGNOSIS — N62 GYNECOMASTIA: ICD-10-CM

## 2025-05-12 DIAGNOSIS — C32.9 LARYNGEAL CANCER: ICD-10-CM

## 2025-05-12 DIAGNOSIS — E03.9 HYPOTHYROIDISM, UNSPECIFIED TYPE: ICD-10-CM

## 2025-05-12 DIAGNOSIS — E11.65 TYPE 2 DIABETES MELLITUS WITH HYPERGLYCEMIA, WITH LONG-TERM CURRENT USE OF INSULIN: ICD-10-CM

## 2025-05-12 DIAGNOSIS — Z79.4 TYPE 2 DIABETES MELLITUS WITH HYPERGLYCEMIA, WITH LONG-TERM CURRENT USE OF INSULIN: ICD-10-CM

## 2025-05-12 DIAGNOSIS — E53.8 BIOTIN-(PROPIONYL-COA-CARBOXYLASE) LIGASE DEFICIENCY: ICD-10-CM

## 2025-05-12 DIAGNOSIS — E20.9 HYPOPARATHYROIDISM, UNSPECIFIED HYPOPARATHYROIDISM TYPE: ICD-10-CM

## 2025-05-12 DIAGNOSIS — D50.9 IRON DEFICIENCY ANEMIA, UNSPECIFIED IRON DEFICIENCY ANEMIA TYPE: ICD-10-CM

## 2025-05-12 DIAGNOSIS — E83.51 HYPOCALCEMIA: ICD-10-CM

## 2025-05-12 PROBLEM — E11.618 TYPE 2 DIABETES MELLITUS WITH DIABETIC ARTHROPATHY, WITH LONG-TERM CURRENT USE OF INSULIN: Chronic | Status: RESOLVED | Noted: 2017-07-03 | Resolved: 2025-05-12

## 2025-05-12 LAB
ALBUMIN/CREAT UR: 145 UG/MG
CREAT UR-MCNC: 57.3 MG/DL (ref 23–375)
EAG (SMH): 140 MG/DL (ref 68–131)
HBA1C MFR BLD: 6.5 % (ref 4.5–6.2)
MICROALBUMIN UR-MCNC: 83.1 UG/ML
T4 FREE SERPL-MCNC: 0.98 NG/DL (ref 0.71–1.51)
TSH SERPL-ACNC: 0.72 UIU/ML (ref 0.34–5.6)

## 2025-05-12 PROCEDURE — 3008F BODY MASS INDEX DOCD: CPT | Mod: CPTII,S$GLB,, | Performed by: INTERNAL MEDICINE

## 2025-05-12 PROCEDURE — 1159F MED LIST DOCD IN RCRD: CPT | Mod: CPTII,S$GLB,, | Performed by: INTERNAL MEDICINE

## 2025-05-12 PROCEDURE — 82570 ASSAY OF URINE CREATININE: CPT

## 2025-05-12 PROCEDURE — 3078F DIAST BP <80 MM HG: CPT | Mod: CPTII,S$GLB,, | Performed by: INTERNAL MEDICINE

## 2025-05-12 PROCEDURE — 1101F PT FALLS ASSESS-DOCD LE1/YR: CPT | Mod: CPTII,S$GLB,, | Performed by: INTERNAL MEDICINE

## 2025-05-12 PROCEDURE — 3066F NEPHROPATHY DOC TX: CPT | Mod: CPTII,S$GLB,, | Performed by: INTERNAL MEDICINE

## 2025-05-12 PROCEDURE — 83036 HEMOGLOBIN GLYCOSYLATED A1C: CPT

## 2025-05-12 PROCEDURE — 99999 PR PBB SHADOW E&M-EST. PATIENT-LVL III: CPT | Mod: PBBFAC,,, | Performed by: INTERNAL MEDICINE

## 2025-05-12 PROCEDURE — 3074F SYST BP LT 130 MM HG: CPT | Mod: CPTII,S$GLB,, | Performed by: INTERNAL MEDICINE

## 2025-05-12 PROCEDURE — 84443 ASSAY THYROID STIM HORMONE: CPT

## 2025-05-12 PROCEDURE — 99213 OFFICE O/P EST LOW 20 MIN: CPT | Mod: S$GLB,,, | Performed by: INTERNAL MEDICINE

## 2025-05-12 PROCEDURE — 1160F RVW MEDS BY RX/DR IN RCRD: CPT | Mod: CPTII,S$GLB,, | Performed by: INTERNAL MEDICINE

## 2025-05-12 PROCEDURE — 3060F POS MICROALBUMINURIA REV: CPT | Mod: CPTII,S$GLB,, | Performed by: INTERNAL MEDICINE

## 2025-05-12 PROCEDURE — 36415 COLL VENOUS BLD VENIPUNCTURE: CPT

## 2025-05-12 PROCEDURE — 3044F HG A1C LEVEL LT 7.0%: CPT | Mod: CPTII,S$GLB,, | Performed by: INTERNAL MEDICINE

## 2025-05-12 PROCEDURE — 1126F AMNT PAIN NOTED NONE PRSNT: CPT | Mod: CPTII,S$GLB,, | Performed by: INTERNAL MEDICINE

## 2025-05-12 PROCEDURE — 3288F FALL RISK ASSESSMENT DOCD: CPT | Mod: CPTII,S$GLB,, | Performed by: INTERNAL MEDICINE

## 2025-05-12 PROCEDURE — 84439 ASSAY OF FREE THYROXINE: CPT

## 2025-05-12 PROCEDURE — 1157F ADVNC CARE PLAN IN RCRD: CPT | Mod: CPTII,S$GLB,, | Performed by: INTERNAL MEDICINE

## 2025-05-12 RX ORDER — PROMETHAZINE HYDROCHLORIDE AND DEXTROMETHORPHAN HYDROBROMIDE 6.25; 15 MG/5ML; MG/5ML
5 SYRUP ORAL EVERY 12 HOURS PRN
Qty: 300 ML | Refills: 5 | Status: SHIPPED | OUTPATIENT
Start: 2025-05-12 | End: 2025-11-14

## 2025-05-12 RX ORDER — BENZONATATE 100 MG/1
100 CAPSULE ORAL 3 TIMES DAILY PRN
Qty: 30 CAPSULE | Refills: 5 | Status: SHIPPED | OUTPATIENT
Start: 2025-05-12 | End: 2025-07-11

## 2025-05-13 ENCOUNTER — PATIENT MESSAGE (OUTPATIENT)
Dept: FAMILY MEDICINE | Facility: CLINIC | Age: 75
End: 2025-05-13
Payer: MEDICARE

## 2025-05-29 ENCOUNTER — PATIENT MESSAGE (OUTPATIENT)
Dept: FAMILY MEDICINE | Facility: CLINIC | Age: 75
End: 2025-05-29
Payer: MEDICARE

## 2025-05-29 DIAGNOSIS — C32.9 LARYNX CANCER: ICD-10-CM

## 2025-05-29 DIAGNOSIS — R05.9 COUGH, UNSPECIFIED TYPE: ICD-10-CM

## 2025-06-04 ENCOUNTER — PATIENT MESSAGE (OUTPATIENT)
Facility: CLINIC | Age: 75
End: 2025-06-04
Payer: MEDICARE

## 2025-06-04 ENCOUNTER — TELEPHONE (OUTPATIENT)
Facility: CLINIC | Age: 75
End: 2025-06-04
Payer: MEDICARE

## 2025-06-04 DIAGNOSIS — D50.0 IRON DEFICIENCY ANEMIA DUE TO CHRONIC BLOOD LOSS: Primary | ICD-10-CM

## 2025-06-04 DIAGNOSIS — C01 MALIGNANT NEOPLASM OF BASE OF TONGUE: ICD-10-CM

## 2025-06-07 RX ORDER — BENZONATATE 100 MG/1
100 CAPSULE ORAL 3 TIMES DAILY PRN
Qty: 270 CAPSULE | Refills: 3 | Status: CANCELLED | OUTPATIENT
Start: 2025-06-07 | End: 2026-06-07

## 2025-06-09 ENCOUNTER — TELEPHONE (OUTPATIENT)
Facility: CLINIC | Age: 75
End: 2025-06-09
Payer: MEDICARE

## 2025-06-09 ENCOUNTER — PATIENT MESSAGE (OUTPATIENT)
Facility: CLINIC | Age: 75
End: 2025-06-09
Payer: MEDICARE

## 2025-06-09 DIAGNOSIS — C01 MALIGNANT NEOPLASM OF BASE OF TONGUE: ICD-10-CM

## 2025-06-09 DIAGNOSIS — D50.0 IRON DEFICIENCY ANEMIA DUE TO CHRONIC BLOOD LOSS: ICD-10-CM

## 2025-06-12 LAB
ALBUMIN SERPL-MCNC: 4.3 G/DL (ref 3.8–4.8)
ALP SERPL-CCNC: 167 IU/L (ref 44–121)
ALT SERPL-CCNC: 28 IU/L (ref 0–44)
AST SERPL-CCNC: 32 IU/L (ref 0–40)
BASOPHILS # BLD AUTO: 0 X10E3/UL (ref 0–0.2)
BASOPHILS NFR BLD AUTO: 0 %
BILIRUB SERPL-MCNC: 1 MG/DL (ref 0–1.2)
BUN SERPL-MCNC: 26 MG/DL (ref 8–27)
BUN/CREAT SERPL: 24 (ref 10–24)
CALCIUM SERPL-MCNC: 8.5 MG/DL (ref 8.6–10.2)
CHLORIDE SERPL-SCNC: 104 MMOL/L (ref 96–106)
CO2 SERPL-SCNC: 18 MMOL/L (ref 20–29)
CREAT SERPL-MCNC: 1.09 MG/DL (ref 0.76–1.27)
EOSINOPHIL # BLD AUTO: 0.2 X10E3/UL (ref 0–0.4)
EOSINOPHIL NFR BLD AUTO: 4 %
ERYTHROCYTE [DISTWIDTH] IN BLOOD BY AUTOMATED COUNT: 13.7 % (ref 11.6–15.4)
EST. GFR  (NO RACE VARIABLE): 72 ML/MIN/1.73
FERRITIN SERPL-MCNC: 144 NG/ML (ref 30–400)
GLOBULIN SER CALC-MCNC: 2.1 G/DL (ref 1.5–4.5)
GLUCOSE SERPL-MCNC: 149 MG/DL (ref 70–99)
HCT VFR BLD AUTO: 41.4 % (ref 37.5–51)
HGB BLD-MCNC: 13.7 G/DL (ref 13–17.7)
IMM GRANULOCYTES # BLD AUTO: 0 X10E3/UL (ref 0–0.1)
IMM GRANULOCYTES NFR BLD AUTO: 1 %
IRON SATN MFR SERPL: 28 % (ref 15–55)
IRON SERPL-MCNC: 86 UG/DL (ref 38–169)
LYMPHOCYTES # BLD AUTO: 0.6 X10E3/UL (ref 0.7–3.1)
LYMPHOCYTES NFR BLD AUTO: 14 %
MCH RBC QN AUTO: 31.9 PG (ref 26.6–33)
MCHC RBC AUTO-ENTMCNC: 33.1 G/DL (ref 31.5–35.7)
MCV RBC AUTO: 97 FL (ref 79–97)
MONOCYTES # BLD AUTO: 0.3 X10E3/UL (ref 0.1–0.9)
MONOCYTES NFR BLD AUTO: 6 %
NEUTROPHILS # BLD AUTO: 3.5 X10E3/UL (ref 1.4–7)
NEUTROPHILS NFR BLD AUTO: 75 %
PLATELET # BLD AUTO: 109 X10E3/UL (ref 150–450)
POTASSIUM SERPL-SCNC: 3.9 MMOL/L (ref 3.5–5.2)
PROT SERPL-MCNC: 6.4 G/DL (ref 6–8.5)
RBC # BLD AUTO: 4.29 X10E6/UL (ref 4.14–5.8)
SODIUM SERPL-SCNC: 141 MMOL/L (ref 134–144)
TIBC SERPL-MCNC: 305 UG/DL (ref 250–450)
UIBC SERPL-MCNC: 219 UG/DL (ref 111–343)
WBC # BLD AUTO: 4.7 X10E3/UL (ref 3.4–10.8)

## 2025-06-17 ENCOUNTER — INFUSION (OUTPATIENT)
Dept: INFUSION THERAPY | Facility: HOSPITAL | Age: 75
End: 2025-06-17
Attending: INTERNAL MEDICINE
Payer: MEDICARE

## 2025-06-17 VITALS
SYSTOLIC BLOOD PRESSURE: 126 MMHG | BODY MASS INDEX: 24.77 KG/M2 | OXYGEN SATURATION: 97 % | DIASTOLIC BLOOD PRESSURE: 73 MMHG | HEART RATE: 73 BPM | HEIGHT: 66 IN | WEIGHT: 154.13 LBS | TEMPERATURE: 98 F | RESPIRATION RATE: 16 BRPM

## 2025-06-17 DIAGNOSIS — C01 MALIGNANT NEOPLASM OF BASE OF TONGUE: ICD-10-CM

## 2025-06-17 DIAGNOSIS — C32.9 LARYNX CANCER: Primary | ICD-10-CM

## 2025-06-17 DIAGNOSIS — E86.0 DEHYDRATION: ICD-10-CM

## 2025-06-17 PROCEDURE — 25000003 PHARM REV CODE 250: Performed by: INTERNAL MEDICINE

## 2025-06-17 PROCEDURE — 63600175 PHARM REV CODE 636 W HCPCS: Performed by: INTERNAL MEDICINE

## 2025-06-17 PROCEDURE — A4216 STERILE WATER/SALINE, 10 ML: HCPCS | Performed by: INTERNAL MEDICINE

## 2025-06-17 PROCEDURE — 96523 IRRIG DRUG DELIVERY DEVICE: CPT

## 2025-06-17 RX ORDER — HEPARIN 100 UNIT/ML
500 SYRINGE INTRAVENOUS
OUTPATIENT
Start: 2025-06-17

## 2025-06-17 RX ORDER — HEPARIN 100 UNIT/ML
500 SYRINGE INTRAVENOUS
Status: DISCONTINUED | OUTPATIENT
Start: 2025-06-17 | End: 2025-06-17 | Stop reason: HOSPADM

## 2025-06-17 RX ORDER — SODIUM CHLORIDE 0.9 % (FLUSH) 0.9 %
10 SYRINGE (ML) INJECTION
Status: DISCONTINUED | OUTPATIENT
Start: 2025-06-17 | End: 2025-06-17 | Stop reason: HOSPADM

## 2025-06-17 RX ORDER — SODIUM CHLORIDE 0.9 % (FLUSH) 0.9 %
10 SYRINGE (ML) INJECTION
OUTPATIENT
Start: 2025-06-17

## 2025-06-17 RX ADMIN — HEPARIN 500 UNITS: 100 SYRINGE at 01:06

## 2025-06-17 RX ADMIN — Medication 10 ML: at 01:06

## 2025-06-17 NOTE — PLAN OF CARE
Problem: Infection  Goal: Absence of Infection Signs and Symptoms  Outcome: Progressing  Intervention: Prevent or Manage Infection  Flowsheets (Taken 6/17/2025 0308)  Infection Management: aseptic technique maintained

## 2025-06-23 ENCOUNTER — OFFICE VISIT (OUTPATIENT)
Facility: CLINIC | Age: 75
End: 2025-06-23
Payer: MEDICARE

## 2025-06-23 VITALS
WEIGHT: 152.38 LBS | HEART RATE: 75 BPM | TEMPERATURE: 98 F | HEIGHT: 66 IN | DIASTOLIC BLOOD PRESSURE: 63 MMHG | SYSTOLIC BLOOD PRESSURE: 128 MMHG | OXYGEN SATURATION: 99 % | BODY MASS INDEX: 24.49 KG/M2 | RESPIRATION RATE: 18 BRPM

## 2025-06-23 DIAGNOSIS — E83.51 HYPOCALCEMIA: ICD-10-CM

## 2025-06-23 DIAGNOSIS — D69.6 THROMBOCYTOPENIA: ICD-10-CM

## 2025-06-23 DIAGNOSIS — C32.9 LARYNGEAL CANCER: Primary | ICD-10-CM

## 2025-06-23 PROCEDURE — 3078F DIAST BP <80 MM HG: CPT | Mod: CPTII,S$GLB,, | Performed by: NURSE PRACTITIONER

## 2025-06-23 PROCEDURE — G2211 COMPLEX E/M VISIT ADD ON: HCPCS | Mod: S$GLB,,, | Performed by: NURSE PRACTITIONER

## 2025-06-23 PROCEDURE — 3060F POS MICROALBUMINURIA REV: CPT | Mod: CPTII,S$GLB,, | Performed by: NURSE PRACTITIONER

## 2025-06-23 PROCEDURE — 99999 PR PBB SHADOW E&M-EST. PATIENT-LVL III: CPT | Mod: PBBFAC,,, | Performed by: NURSE PRACTITIONER

## 2025-06-23 PROCEDURE — 99214 OFFICE O/P EST MOD 30 MIN: CPT | Mod: S$GLB,,, | Performed by: NURSE PRACTITIONER

## 2025-06-23 PROCEDURE — 1159F MED LIST DOCD IN RCRD: CPT | Mod: CPTII,S$GLB,, | Performed by: NURSE PRACTITIONER

## 2025-06-23 PROCEDURE — 1101F PT FALLS ASSESS-DOCD LE1/YR: CPT | Mod: CPTII,S$GLB,, | Performed by: NURSE PRACTITIONER

## 2025-06-23 PROCEDURE — 3066F NEPHROPATHY DOC TX: CPT | Mod: CPTII,S$GLB,, | Performed by: NURSE PRACTITIONER

## 2025-06-23 PROCEDURE — 3044F HG A1C LEVEL LT 7.0%: CPT | Mod: CPTII,S$GLB,, | Performed by: NURSE PRACTITIONER

## 2025-06-23 PROCEDURE — 3074F SYST BP LT 130 MM HG: CPT | Mod: CPTII,S$GLB,, | Performed by: NURSE PRACTITIONER

## 2025-06-23 PROCEDURE — 3288F FALL RISK ASSESSMENT DOCD: CPT | Mod: CPTII,S$GLB,, | Performed by: NURSE PRACTITIONER

## 2025-06-23 PROCEDURE — 1126F AMNT PAIN NOTED NONE PRSNT: CPT | Mod: CPTII,S$GLB,, | Performed by: NURSE PRACTITIONER

## 2025-06-23 PROCEDURE — 1157F ADVNC CARE PLAN IN RCRD: CPT | Mod: CPTII,S$GLB,, | Performed by: NURSE PRACTITIONER

## 2025-06-23 PROCEDURE — 3008F BODY MASS INDEX DOCD: CPT | Mod: CPTII,S$GLB,, | Performed by: NURSE PRACTITIONER

## 2025-06-23 RX ORDER — CALCIUM CARBONATE 1250 MG/5ML
SUSPENSION ORAL
Qty: 2300 ML | Refills: 12 | Status: SHIPPED | OUTPATIENT
Start: 2025-06-23

## 2025-06-23 NOTE — PROGRESS NOTES
[facility] Hematology/Oncology  PROGRESS NOTE -  Follow-up Visit      Subjective:       Patient ID:   NAME: Cyrus Batres Jr. : 1950     74 y.o. 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  The patient is here today to go over the results of the recently ordered labs, tests and studies. He is here by himself.    He previously had had completed chemotherapy and  XRT on 3/6/2023 with concurrent Cisplatin and XRT.     He saw Dr James with ENT in March and sees him again in July for another scope.     He saw Dr. Tamez 2025.     He is followed closely by Dr. Moon and Dr. Patterson.   He has been having issues with calcium and sodium for a while and is supplementing with table salt, and calcium supplements     He had CT scans on 2025    He is doing well and is active at home.     He is breathing ok. 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    Cardiovascular:  Negative for chest pain.   Gastrointestinal:  Negative for  abdominal pain and diarrhea.   Genitourinary:  Negative for frequency.   Musculoskeletal:  Negative for back pain.   Skin:  Negative for rash.   Neurological:  Negative for headaches.   Psychiatric/Behavioral:  The patient is not nervous/anxious.           Pain Scale:  0    Allergies:  Review of patient's allergies indicates:   Allergen Reactions    Lovastatin Itching    Pollen extracts     Lovastatin Rash     Not confirmed but pt skeptical       Medications:    Current Outpatient Medications:     benzonatate (TESSALON) 100 MG capsule, Take 1 capsule (100 mg total) by mouth 3 (three) times daily as needed for Cough. Take only as needed, Disp: 30 capsule, Rfl: 5    calcium carbonate 500 mg/5 mL (1,250 mg/5 mL), Take 20 mls by mouth four times daily with meals and nightly., Disp: 2300 mL, Rfl: 12    diclofenac sodium (VOLTAREN ARTHRITIS PAIN) 1 % Gel, Apply 2 g topically once daily. Apply couple of times a day on the affected arthritis pain., Disp: 100 g, Rfl: 1    levothyroxine (SYNTHROID) 100 MCG tablet, Take 1 tablet (100 mcg total) by mouth before breakfast., Disp: 90 tablet, Rfl: 3    promethazine-dextromethorphan (PROMETHAZINE-DM) 6.25-15 mg/5 mL Syrp, Take 5 mLs by mouth every 12 (twelve) hours as needed (cough and cogestion)., Disp: 300 mL, Rfl: 5    PMHx/PSHx Updates:  See patient's last visit with Dr. Tamez 3/20/2025  See H&P on 9/23/2022        Pathology:   Cancer Staging   Larynx cancer  Staging form: Larynx - Glottis, AJCC 8th Edition  - Clinical stage from 8/6/2020: Stage II (cT2, cN0, cM0) - Signed by Fariha Muñoz NP on 8/6/2020  - Pathologic stage from 1/20/2022: Stage LOU (pT4a, pN0, cM0) - Signed by Fariha Muñoz NP on 1/20/2022  Staging form: Pharynx - P16 Negative Oropharynx, AJCC 8th Edition  - Clinical: Stage II (rcT2, cN0, cM0) - Signed by Matheus Portillo Jr., MD on 5/30/2022    Malignant neoplasm of base of tongue  Staging form: Pharynx - P16 Negative Oropharynx, AJCC 8th  "Edition  - Clinical stage from 5/20/2022: Stage II (cT2, cN0, cM0, p16-) - Signed by Saúl Kessler MD on 7/7/2022    Dissection 11/9/2022:    Final Pathologic Diagnosis 1. "left submandibular lymph node", dissection:       - Three lymph nodes, negative for carcinoma (0/3)   2. 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       - Tumor size: 4.5 cm       - Resection margins: left superior pharyngeal margin focally involved;   all other margins are negative for carcinoma       - Left internal jugular vein: free of tumor       - One lymph node, negative for carcinoma (0/1)   Note: P16 immunostain is negative     Tumor Site       Oropharynx  involving Base of tongue       Tumor Laterality       Midline       Tumor Size       Greatest Dimension (Centimeters) 4.5 cm         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)       Histologic Grade       G2 to G3: Moderate to Poorly differentiated       Lymphovascular Invasion       Not identified       Perineural Invasion       Not identified     MARGINS      Margins       Involved by invasive tumor     Margin(s)   Involved by Invasive Tumor:      left superior pharyngeal margin; all other   margins are free of tumor     LYMPH NODES    Regional Lymph Node Status:    :     Regional Lymph Node   Status:      All regional lymph nodes negative for tumor      pT Category:               pT3   pN Category:               pN0      Base of Tongue Biopsy  4/27/2022:  Final Pathologic Diagnosis BIOPSY OF BASE OF THE TONGUE:   THE INFILTRATING POORLY DIFFERENTIATED SQUAMOUS CELL CARCINOMA   THE TUMOR IS P16 NEGATIVE.  THE POSITIVE AND NEGATIVE CONTROLS STAINED   APPROPRIATELY      Larynx resection/thyroidectomy 1/6/2022:     Final Pathologic Diagnosis 1. Lymph nodes, left neck levels 2,  3 and 4, dissection:   - Nineteen lymph nodes, all  negative  for metastatic " carcinoma (0/19)   2. Lymph nodes, right neck levels 2,  3 and 4, dissection:   - Twenty-eight lymph nodes, all  negative  for metastatic carcinoma (0/28)   3. Possible parathyroid gland, excision:   - Benign parathyroid gland tissue (0.003 g)   4. 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 Soft tissue, posterior tracheal margin, excision:   - Benign, focally reactive respiratory mucosa with submucosal acute   inflammation,  negative  for dysplasia or malignancy   6. Soft tissue, anterior tracheal margin, excision:   - Benign respiratory mucosa with subepithelial cartilage,  negative  for   dysplasia or malignancy   7. Soft tissue, posterior tracheal margin #2, excision:   - Benign, focally reactive respiratory mucosa with submucosal acute   inflammation,  negative  for dysplasia or malignancy   8. Soft tissue, anterior tracheal margin #2, excision:   - Benign, reactive focally ulcerated respiratory mucosa with submucosal acute   inflammation,  negative  for dysplasia or malignancy             Laryngoscope 8/4/2020: (with Dr Noel):  Final Pathologic Diagnosis 1.  Left true vocal fold, biopsy:       -  Invasive moderately differentiated squamous cell carcinoma,   keratinizing type   2.  Left true vocal fold, biopsy:       -  Invasive moderately differentiated squamous cell carcinoma,   keratinizing type             Laryngoscope 3/17/2020 (with Dr Xiao):  Final Pathologic Diagnosis 1.  BIOPSY OF LEFT TRUE VOCAL CORD:   SEVERELY DYSPLASTIC APPEARING  "SQUAMOUS MUCOSA   INVASIVE CARCINOMA IS NOT DOCUMENTED   ON THE OTHER HAND, THESE FRAGMENTS ARE NODUL AND WITHOUT SUBMUCOSA FOR   EVALUATION; IT IS POSSIBLE THAT THEY REFLECT INVASIVE SQUAMOUS CARCINOMA   2.  BIOPSY OF LEFT ANTERIOR COMMISSURE:   MODERATE DYSPLASIA   NO INVASIVE CARCINOMA IDENTIFIED             Objective:     Vitals:  Blood pressure 128/63, pulse 75, temperature 98 °F (36.7 °C), temperature source Temporal, resp. rate 18, height 5' 6" (1.676 m), weight 69.1 kg (152 lb 6.4 oz), SpO2 99%.    Physical Examination:   GEN: no apparent distress, comfortable; AAOx3  HEAD: atraumatic and normocephalic  EYES: no pallor, no icterus, PERRLA  ENT: OMM, no pharyngeal erythema, external ears WNL; no nasal discharge; no thrush; s/p laryngectomy with flap since healed well; trach   NECK: no masses, thyroid normal, trachea midline, no LAD/LN's, supple; post-op dissection healed well;    CV: RRR with no murmur; normal pulse; normal S1 and S2; no pedal edema; portacath   CHEST: Normal respiratory effort; CTAB; normal breath sounds; no wheeze or crackles  ABDOM: nontender and nondistended; soft; normal bowel sounds; no rebound/guarding; peg tube  MUSC/Skeletal: ROM normal; no crepitus; joints normal; no deformities or arthropathy  EXTREM: no clubbing, cyanosis, inflammation or swelling  SKIN: no rashes, lesions, ulcers, petechiae or subcutaneous nodules  : no mahan  NEURO: grossly intact; motor/sensory WNL; AAOx3; no tremors  PSYCH: normal mood, affect and behavior  LYMPH: normal cervical, supraclavicular, axillary and groin LN's          Labs:     Lab Results   Component Value Date    WBC 4.7 06/11/2025    HGB 13.7 06/11/2025    HCT 41.4 06/11/2025    MCV 97 06/11/2025     (L) 06/11/2025     CMP  Sodium   Date Value Ref Range Status   06/11/2025 141 134 - 144 mmol/L Final     Potassium   Date Value Ref Range Status   06/11/2025 3.9 3.5 - 5.2 mmol/L Final     Chloride   Date Value Ref Range Status   06/11/2025 " 104 96 - 106 mmol/L Final     CO2   Date Value Ref Range Status   06/11/2025 18 (L) 20 - 29 mmol/L Final     Glucose   Date Value Ref Range Status   06/11/2025 149 (H) 70 - 99 mg/dL Final     BUN   Date Value Ref Range Status   06/11/2025 26 8 - 27 mg/dL Final     Creatinine   Date Value Ref Range Status   06/11/2025 1.09 0.76 - 1.27 mg/dL Final     Calcium   Date Value Ref Range Status   06/11/2025 8.5 (L) 8.6 - 10.2 mg/dL Final     Total Protein   Date Value Ref Range Status   03/14/2025 6.8 6.0 - 8.4 g/dL Final     Albumin   Date Value Ref Range Status   06/11/2025 4.3 3.8 - 4.8 g/dL Final   03/14/2025 4.3 3.5 - 5.2 g/dL Final   11/18/2020 3.7 3.6 - 5.1 g/dL Final     Comment:     For additional information, please refer to   http://education.Fari[employer]/faq/MGN382 (This link is   being provided for informational/ educational purposes only.)  This test was developed and its analytical performance   characteristics have been determined by "[employer]. It has not been cleared or approved by the   [place] Food and Drug Administration. This assay has been validated   pursuant to the CLIA regulations and is used for clinical   purposes.  @ Test Performed By:  [facility]  Yo Cortes M.D.,   [address] [place] [id number]  [place]  [id number]       Total Bilirubin   Date Value Ref Range Status   06/11/2025 1.0 0.0 - 1.2 mg/dL Final     Alkaline Phosphatase   Date Value Ref Range Status   03/14/2025 155 (H) 55 - 135 U/L Final     AST   Date Value Ref Range Status   06/11/2025 32 0 - 40 IU/L Final     ALT   Date Value Ref Range Status   06/11/2025 28 0 - 44 IU/L Final     Anion Gap   Date Value Ref Range Status   03/14/2025 7 (L) 8 - 16 mmol/L Final     eGFR if    Date Value Ref Range Status   07/29/2022 >60.0 >60 mL/min/1.73 m^2 Final     eGFR if non    Date Value Ref Range Status   07/29/2022  >60.0 >60 mL/min/1.73 m^2 Final     Comment:     Calculation used to obtain the estimated glomerular filtration  rate (eGFR) is the CKD-EPI equation.                   Radiology/Diagnostic Studies:      CT Neck/Chest 2/28/2025:    Impression:     1. No CT evidence of residual or recurrent malignancy.  2. Extensive postoperative change in the neck, stable in appearance.  3. 9 mm endotracheal density in the upper chest, likely retained secretions.  4. Multifocal coronary artery atherosclerotic calcification.  5. New right-sided mild gynecomastia.  6. Additional findings as above.        PET 2/7/2024:  IMPRESSION:     Extensive postoperative changes of the neck as outlined above, stable compared to prior PET/CT.  No convincing evidence of residual/recurrent FDG avid malignancy.     Mild FDG avidity of the proximal thoracic esophagus near the operative site, decreased compared to prior.     FDG avidity of arthritic right sternoclavicular joint          Ct Chest/Neck 9/21/2023:    IMPRESSION:     1. Extensive postsurgical changes throughout the neck as described, with no evidence of residual or recurrent malignancy.  2. Negative for metastatic disease throughout the neck or the chest.  3. 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  4. Coronary artery calcifications.        PET 5/30/2023:    IMPRESSION: Postsurgical changes from total glossectomy, laryngectomy and pharyngectomy with bilateral neck dissections     There is resolution of the previously noted fluid collections within the neck. There is mild FDG activity in the left side the neck adjacent to the neoesophagus as well as at the tracheostomy site to the right of the neoesophagus. Findings most likely are secondary to postsurgical and postinfection changes. There is no focal mass or adenopathy     No evidence of distant metastasis             CTA Neck    Result Date: 9/20/2022  EXAMINATION: CTA NECK CLINICAL HISTORY: Head/neck cancer,  monitor;Malignant neoplasm of base of tongue TECHNIQUE: CT angiogram was performed from the level of the scott to the EAC following the IV administration of 75mL of Omnipaque 350.   Sagittal and coronal reconstructions and maximum intensity projection reconstructions were performed. Arterial stenosis percentages are based on NASCET measurement criteria. COMPARISON: CTA neck dated 05/20/2022 FINDINGS: Aortic arch and great vessels: There is a conventional left-sided 3 vessel arch.  The aortic arch is widely patent.  There is stable soft and calcified plaque in the proximal left subclavian artery with a similar appearance the prior study and possibly within radiation field but without critical stenosis or occlusion.  The right subclavian artery is patent.  The brachiocephalic trunk and origin of the left common carotid artery are patent. Carotid arteries Right carotid artery: There is calcified plaque scattered in the right common carotid artery without critical stenosis, occlusion or change.  There is no measurable stenosis of the internal carotid artery which is patent to the skull base. Left carotid artery: There is mild plaque scattered in the left common carotid artery without change and without critical stenosis or occlusion.  There is no measurable stenosis of the internal carotid artery. Vertebral arteries: The left vertebral artery is dominant and patent throughout its course in the neck.  The right vertebral artery is hypoplastic but patent.  There is no critical stenosis, occlusion, thrombus or dissection.  There is no change. The study extends intracranially.  There is calcified plaque in the V4 segment of the left vertebral artery resulting in a moderate to marked short segment nonocclusive stenosis.  The right vertebral artery partially terminates in a posteroinferior cerebellar artery with a short segment moderate to marked stenosis of the very distal right vertebral artery.  Some flow within the  distal right vertebral artery may represent retrograde flow from the dominant left vertebral artery.  The basilar artery is patent.  The origins of the superior cerebellar and posterior cerebral artery on the right are patent.  There is fetal origin of the left posterior cerebral artery which is patent. There is plaque in the cavernous segments of both internal carotid arteries but there is no critical stenosis or large vessel occlusion of the anterior circulation vessels.  There is no large aneurysm. Other: There is a similar appearance of the included upper lungs with pleural and parenchymal changes anteriorly in the upper lobes bilaterally.  As previously discussed, timing of the contrast bolus is performed during the arterial phase for CTA neck.  Therefore, enhancement of the soft tissues is somewhat suboptimal due to this technique. There has been a large region of soft tissue resection in the anterior mid and lower neck soft tissues with continued open defect in the upper chest and lower neck above the manubrium.  Soft tissue seen along the left posterolateral border of the trachea could represent secretions or mucus.  This was present previously. Redemonstrated is a large heterogeneously enhancing lesion centered at the level of the tongue base and floor of the mouth centrally into the left.  There is scattered calcifications.  The prior CTA demonstrated regions of central necrosis which are no longer definitely present but diffusely heterogeneous density and enhancement likely represents regions of necrosis.  This large heterogeneously enhancing soft tissue mass extends more anteriorly in the floor of the mouth on the left and measures at least 5.6 cm in greatest anterior to posterior dimension with 3.6 cm transverse dimension.  This does extend anteriorly to the medial margin of the body of the mandible on the left. There are post treatment changes with stranding in the neck fat.     1. Similar appearance  of the neck vessels when compared to the prior CTA neck with mild narrowing at the origin of the left subclavian artery.  There is no critical stenosis, occlusion, thrombosis or dissection involving the cervical vertebral or carotid arteries. 2. Larger mass within the hypopharynx and tongue base extending anteriorly to the floor of the mouth on the left to the medial margin of the body of the mandible without obvious bony destruction. 3. There is nonocclusive stenosis of the distal V4 segment of the left vertebral artery without change. 4. There is no large vessel occlusion or critical stenosis of the anterior circulation. 5. There is developmental variation with fetal origin of the left posterior cerebral artery. Electronically signed by: Rex Joyner MD Date:    09/20/2022 Time:    11:31    CT Abdomen Pelvis With Contrast    Result Date: 9/1/2022  CMS MANDATED QUALITY DATA - CT RADIATION - 436 All CT scans at this facility utilize dose modulation, iterative reconstruction, and/or weight based dosing when appropriate to reduce radiation dose to as low as reasonably achievable. Reason: abdominal pain partial history of laryngeal carcinoma TECHNIQUE: CT abdomen and pelvis with 100 mL Omnipaque 350. COMPARISON: PET/CT 5/13/2022 CT ABDOMEN: Coronary artery calcification and extremely tiny hiatal hernia incidentally noted. Visualized lung bases are clear. Liver, gallbladder, pancreas, spleen, adrenals, and kidneys are normal. Mild aortoiliac calcifications present. Gastrostomy tube tip lies in distal gastric body. Small intestines are unremarkable. Mild wall thickening affects the ascending colon with pneumatosis intestinalis diffusely affecting the ascending colon. No other free intraperitoneal gas or, and remainder of colon is normal. A normal appendix is present. The major mesenteric vascular structures are patent. No acute osseous abnormality. CT PELVIS: Prostate is slightly enlarged. Bladder is normal. No free  pelvic fluid. Tiny right and small to moderate left fat-containing inguinal hernias are present. No acute osseous abnormality. IMPRESSION: 1. Pneumatosis intestinalis affecting the ascending colon with associated mild wall thickening. Etiology of this is undetermined. Potential considerations include intestinal ischemia or pneumatosis related to chemotherapy. Clinical and laboratory correlation is needed to assess significance. No portal venous gas or free intraperitoneal air however. 2. Coronary artery calcifications Electronically signed by:  Nael Hui MD  9/1/2022 6:20 PM CDT Workstation: [id number]    NM PET CT Routine Skull to Mid Thigh    Result Date: 9/27/2022  PET CT WITH IMAGE FUSION HISTORY:  restage tongue cancer RESTAGING...  HX: TONGUE CA.  DX: April 2022.  LAST CHEMO TREATMENT WAS 3WKS AGO.  EVALUATE TX RESPONSE.   ALSO HX OF LARYNGEAL CA IN AUGUST 2020.  MULTIPLE SURGERIES: LARYNGECTOMY, THYROIDECTOMY & TRACHEOSTOMY.  RAD TX WAS COMPLETED IN NOV 2020. TECHNIQUE: Following IV administration of 11.2 mCi of F-18 labeled FDG into right antecubital fossa and a 60 minute delay, PET CT was performed from the vertex of the skull through the proximal thighs with an integrated PET CT scanner with image fusion. CT images were obtained to aid in attenuation correction and PET localization. The patient's serum glucose at the time of the exam was 136 mg/dL. COMPARISON: PET/CT 5/13/2022 FINDINGS: Poorly characterized soft tissue mass involving base of tongue approximately measures 4.3 x 2.8 cm (series 3 image 65) appearing similar to the prior exam. This reaches current max SUV of 11.8, not significantly changed from prior of 11.4. No other abnormal FDG activity. Intracranial compartment is unremarkable. Trace bilateral maxillary sinus mucosal thickening is present. Postoperative changes of laryngectomy and tracheostomy are present. Surgical clips occur throughout the neck. No definite enlarged cervical or  supraclavicular lymph node, with evaluation limited by lack of IV contrast. Left subclavian port catheter tip terminates in SVC. Diffuse coronary artery calcifications are present. No enlarged mediastinal or axillary lymph nodes. No pulmonary nodule or mass. Gastrostomy tube tip lies in gastric body. Moderate aortoiliac calcifications are present. Small fat-containing left inguinal hernia incidentally noted. Mild degenerative changes affect the spine. No suspicious osseous abnormality. IMPRESSION: 1. No significant change in the FDG avid mass involving the tongue base, remaining characteristic of malignancy. 2. No new FDG avid malignancy or metastatic disease. Electronically signed by:  Nael Hui MD  9/27/2022 2:38 PM CDT Workstation: [id number]    CT Abdomen Pelvis  Without Contrast    Result Date: 9/4/2022  CMS MANDATED QUALITY DATA - CT RADIATION  436 All CT scans at this facility utilize dose modulation, iterative reconstruction, and/or weight based dosing when appropriate to reduce radiation dose to as low as reasonably achievable. CT ABDOMEN PELVIS WITHOUT IV CONTRAST CLINICAL HISTORY: 72 years Male Follow-up pneumatosis COMPARISON: CT abdomen and pelvis September 1, 2022 FINDINGS: Imaging through the lower thorax demonstrates subsegmental atelectasis of the dependent lower lobes. Coronary artery calcification. Bone window images show no acute or aggressive osseous abnormality. Transitional lumbosacral vertebral body. On this unenhanced exam, no focal hepatic lesion. Gallbladder and biliary tree are unremarkable. Spleen appears normal. Pancreas is unremarkable. No adrenal lesion. No renal calculi or hydronephrosis. Ureters are normal in caliber. Urinary bladder is within normal limits. Gastrostomy tube is in place within the stomach. Stomach is largely collapsed. No evidence of small bowel obstruction. Pneumatosis and pericolonic abscess involving the ascending colon is redemonstrated, slightly diminished  compared to prior. Mild wall thickening throughout the colon. No free fluid or free air within the abdomen or pelvis. Aortoiliac atherosclerotic calcification. No pathologically enlarged lymph nodes within the abdomen or pelvis. Bilateral fat-containing inguinal hernias, larger on the left. IMPRESSION: Pneumatosis and pericolonic gas about the ascending colon has decreased in volume compared to prior. Persistent colonic wall thickening which could indicate colitis. Coronary and aortic atherosclerosis. Gastrostomy tube is within the stomach. Bilateral inguinal hernias. Electronically signed by:  Jose De Jesus Oleary MD  9/4/2022 4:06 PM CDT Workstation: [id number]    CTA neck  5/20/2022:     IMPRESSION:  Persistent mass within the hypopharynx consistent with malignancy.  By my measurements it is larger than on April 24, 2022 with central necrosis.  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 This is similar to the previous April 2022 study.  No evidence of arterial compromise related to malignancy.     PET 5/13/2022:     IMPRESSION:  1. Postoperative changes of prior laryngectomy and lymph node resection/radiation.  2. Masslike FDG avid lesion to the left of midline at the tongue base concerning for residual/recurrent disease.  3. Adjacent confluent nodular extension along the inferior left side of the mass.  4. No FDG avid lymphadenopathy or convincing additional sites of disease in the chest, abdomen or pelvis.     CT head 5/10/2022:  FINDINGS: Comparison to multiple prior exams. There is no acute intracranial hemorrhage, with no mass effect or abnormal extra-axial fluid. Mild scattered areas of nonspecific hypoattenuation involve the deep periventricular white matter, with gray-white differentiation maintained.     There is mild generalized prominence of the cortical sulci and ventricles. The cerebellum and brainstem are unremarkable. There are carotid siphon  vascular calcifications. The visualized paranasal sinuses and mastoid air cells are clear. There is no acute calvarial fracture or scalp hematoma.     IMPRESSION: No acute intracranial hemorrhage or acute calvarial fracture     CT soft neck 4/24/2022;     Oral tongue/tongue base:  At the base of the tongue on the left there are findings of a heterogeneously enhancing mass measuring 2.1 cm highly concerning for a neoplastic process.     True and false cords:  Patient status post laryngectomy which has been performed in the interim. Soft tissue stranding in the lower neck likely related to a previous flat reconstruction. No enhancement or lymphadenopathy within the lower neck.     Lymph node assessment:Negative     Surrounding soft tissues:  Negative     Vasculature:  Negative     Osseous structures:  Negative     Lung apices:  Scarring involving the lung apices bilaterally likely related to previous radiation.     IMPRESSION:  1. Postoperative and radiation changes involving the lower neck.  2. Findings highly concerning for a developing mass at the left base of the tongue enhancing 2.1 cm region. Direct visualization in this region would be of benefit.        CT soft neck  12/24/2021  IMPRESSION:  1.  Laryngeal mass as on prior exam. Laryngeal airway narrowing has not changed.  2.  No evidence for active hemorrhage.  3.  Partially visualized patchy groundglass infiltrates in both upper lobes. Also see CT chest report     CTA Chest 12/24/2021:  IMPRESSION:     1.  No pulmonary embolism.     2.  Soft tissue stranding in the region of the strap musculature with ill-defined hypodensity measuring 2.8 x 1.4 cm in the cervical midline.  Findings concerning for infectious process or abscess. Neoplastic process could have similar imaging characteristics.     3.  Suggested erosion of the proximal right parasymphyseal aspect of the thyroid shield.  Correlated with CT soft tissue neck findings. Findings could represent  osteomyelitis. Neoplastic process cannot be excluded.     4.  Hepatic steatosis.  5.  Thickening of the gastric wall. Prominence of perigastric vasculature. Small hiatal hernia.     6.  Moderate stool burden.  Correlate for constipation.        PET 12/15/2021:  IMPRESSION:     Substantial qualitative and quantitative increase of hypermetabolic FDG activity associated with the larynx in this patient with known supraglottic neoplasm.     No evidence of FDG avid metastatic disease involving neck, chest, abdomen or pelvis.     PET 8/21/2020:     Impression:     1. Intensely hypermetabolic plaque-like mass along the left true vocal cord, compatible with known laryngeal carcinoma.  2. No findings of regional metastatic disease in the neck, or distant metastatic disease.        CT Chest 8/10/2020:     IMPRESSION:     No metastatic disease within the chest.  Three-vessel coronary artery calcification. Nondilated cardiac  chambers     CT Soft neck 7/22/2020:  IMPRESSION:  1. Slight interval increase in size of the previously described  enhancing nodule along the anterior left vocal cord.  2. No pathologic lymphadenopathy.  3. Additional and incidental findings as noted above.     CT Soft neck  3/16/2020:     Impression:     6 mm enhancing focus involving the superior aspect of the left focal cord near the anterior commisure.  Recommend direct visualization for further evaluation of laryngeal polyp     Question of 2 mm submucosal lipoma involving the midportion of the superior aspect of the left vocal cord.     Mild arteriosclerosis involving the brachiocephalic arteries and carotid arteries     Mild degenerative change of the cervical spine        I have reviewed all available lab results and radiology reports.    Assessment History:    (1) 74 y.o. 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     -  Patient has been under the care of Dr Khoobehi with [facility] and Dr Portillo with rad/onc.   - He was recently found to have a new primary cancer involving the base of his tongue in April 2022. He was deemed not to be a candidate for any further radiation and did not want to undergo any further surgery as this would necessitate a glossectomy procedure.   - He has been on adjuvant chemotherapy per direction of Dr Khoobehi and has had 3 cycles. His therapy course has been complicated with persistent diarrhea and N/V.      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 He saw Dr Lucero with Rad/onc this am. They are going to present his case to [facility] Tumor [facility] at [facility] [place] and plans to see Dr James about surgical options. If he is not a surgical candidate then will proceed with cisplatin and XRT combine therapy.     10/6/2022:  - He saw Dr Lucero with Rad/onc, and Dr James and his case was presented to [facility] Tumor Board at [facility] [place] and  he general consensus was to proceed to surgery.  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   - He had his portacath removed on 11/28.   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, keratinizing type, moderate to poorly differentiated tumor  - Four LN's were all negative  - he did have a positive left superior pharyngeal margin  - pT3 pN0  - reviewed the latest NCCN guidelines again from Version 1.2023; he may need some further systemic therapy due to the margin  - check on Rad/onc follow-up     1/23/2023:  - s/p new portacath placed on 1/12/2023 with Dr Le  - starting combined chemotherapy and XRT - cisplatin  - s/p chemotherapy school with [facility]    2/6/2023:  - he seems to be tolerating the chemotherapy well at this time  - continued with concomitant therapy with XRT    2/22/2023:  - he seems to be tolerating the chemotherapy well at this time  - continued with concomitant therapy with XRT  - labs relatively adequate  - will check on his refills    3/6/2023:  - finishing up XRT this comking Thursday  - last chemotherapy today    4/3/2023:  - He has since completed chemotherapy and  XRT.  He seems to have tolerated the regimen fairly well with no new complaints.  Some weight loss but reports he is staying hydrated. He does have some occasional GRIFFIN.   - He is seeing ENT tomorrow with Dr James    5/31/2023:  - He has since completed chemotherapy and  XRT.  He seems to have tolerated the regimen fairly well . He saw Dr James with ENT on 5/2/2023 and had repeat scope with good report per patient.  - He had recent PET yesterday on 5/30 which overall looks adequate    8/23/2023:  - sees Dr James again on 9/5/2023  - will set up repeat CT neck and chest for Sept 2023  - reach out to dietary about foods he can eat that are higher in iron  - consider IV iron x2 (he can not swallow pills)  - see if we can renew PT    11/15/2023:  - He previously had completed chemotherapy and  XRT.  He seems to have tolerated the regimen fairly well . He saw Dr James with ENT on 11/72023 and had repeat scope with good report per patient.  - He  last saw Dr Patterson on 8/3/2023 and sees him again in Jan 2024, Dr Portillo on 6/19/2023  - He had last PET on 5/30  and CT Chest/Neck in Sept 2023  - he missed one IV iron  - due for up to date labs    2/20/2024:  - He saw Dr James with ENT 2/6/2024 with DL with good report per patient. He sees him again in 3 months  - He saw Dr Patterson on 2/5/2024  - He had PEt on 2/7/2024   - some residual thrombocytopenia post-chemotherapy - monitor for now as this may take some time to recover     5/20/2024:  - He saw Dr James with ENT 5/7/2024 with DL with good report per patient. He sees him again on 6/18/2024 for ear flush.   - He saw Dr Patterson on 5/10/2024; Dena Silveira NP with endocrine in April 2024  - he had tube exchange with Dr Cooper in April 2024  - still having some hypocalcemia issues  - he declined referral to podiatrist for his right ankle issues    8/20/2024:  - he recently saw Dr James and had repeat LS  - sees Dr James again in Sepot 2024  - will order f/u CT chest/neck  - mild anemia and mild thrombocytopenia - will continue to monitor    3/20/2025:  - He saw Dr James with ENT again on 3/3/ and had laryngoscope and he sees him again in 4 months  - He saw Briana Carey NP in Dec 2024  - He saw Dr Patterson in Nov 2024; he saw Dr Padilla with cardiology in Oct 2024; Dena Silveira NP with endocrine in April 2024  - He had CT scans on 2/28/2025 6/23/2025:   - due for repeat scope with [facility] in July   - see endocrine soon - Dr. Moon - per patient   - continue sodium and calcium supplements          (2) Hx/of Laryngeal cancer in Aug 2020 stage II (cT2 N0)  - s/p radiation followed by bilateral neck dissection and total thyroidectomy     Brief Oncology Summary for his laryngeal CA hx:     Patient was noted to initially have a suspicious lesion on the left true vocal cord by laryngoscopy with Dr Xiao in March 2020 with biopsy showing severe dysplastic changes without definitive invasive process. He had a CT  scan on 3/16/2020 which showed a 6mm nodule on the left vocal cord. A repeat Ct scan four months later on 7/22/2020 showed the nodule enlarged to 1.4 x 1.1 cm in size. Patient was then seen by Dr Noel and underwent repeat scope in Aug 2020 who found a bulky deeply invading tumor which was debulked and the pathology coming back moderately differentiated SCCA without lymphovascular or perineural invasion. Hs case was subsequently presented to the tumor board and the consensus was to proceed with radiation. He completed XRT on 10/30/2020 and proceeded with regular laryngoscopy surveillance. He eventually required salvage laryngectomy and neck dissection with flap with Dr James on Jan 6th 2022. 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 Fifty lymph nodes were removed which were all negative. Pathology TNM was pT4a, pN0. Patient did not require any adjuvant therapy afterwards.      Co morbidities:     (2) HTN and hypercholesterolemia     (3) Paroxysmal atrial fibrillation, V tach - seen by Anne Ugarte NP     (4) DM II     (5) B12 deficiency      (6) Fatty liver disease, GERD           Assessment and Plan for today:   1. Laryngeal cancer  -     CBC Auto Differential; Future; Expected date: 06/23/2025  -     CMP; Future; Expected date: 06/23/2025    2. Thrombocytopenia  -     Platelet antibodies, indirect; Future; Expected date: 06/23/2025  -     Platelet Autoantibody, Cell Bound; Future; Expected date: 06/23/2025  -     CBC Auto Differential; Future; Expected date: 06/23/2025  -     CMP; Future; Expected date: 06/23/2025    3. Hypocalcemia  -     calcium carbonate 500 mg/5 mL (1,250 mg/5 mL); Take 20 mls by mouth four times daily with meals and nightly.  Dispense: 2300 mL; Refill: 12         PLAN:  continue with ENT and rad/onc f/u as directed by them respectively - sees Dr James again  in July with scope, cbc, cmp monthly supplement as needed with IV iron.   Platelet  count has dropped to 109, platelet antibodies ordered to be done at [facility] per patient request   Continue calcium and sodium supplements and follow up with endocrine as scheduled on 7/7/20525     C 3 months with Dr. Tamez sooner if needed     Discussion:     COVID-19 Discussion:     I had long discussion with patient and any applicable family about the COVID-19 coronavirus epidemic and the recommended precautions with regard to cancer and/or hematology patients. I have re-iterated the CDC recommendations for adequate hand washing, use of hand -like products, and coughing into elbow, etc. In addition, especially for our patients who are on chemotherapy and/or our otherwise immunocompromised patients, I have recommended avoidance of crowds, including movie theaters, restaurants, churches, etc. I have recommended avoidance of any sick or symptomatic family members and/or friends. I have also recommended avoidance of any raw and unwashed food products, and general avoidance of food items that have not been prepared by themselves. The patient has been asked to call us immediately with any symptom developments, issues, questions or other general concerns.         Pathology Discussion:     I reviewed and discussed the pathology report(s) and radiograph reports (if available) in as simple to understand and/or laymen's terms to the best of my ability. I had an indepth conversation with the patient and went over the patient's individual diagnosis based on the information that was currently available. I discussed the TNM staging process with regard to the patient's particular cancer type, and the calculated stage based on the currently available TNM data and literature. I discussed the available prognostic data with regard to the current staging information and how it relates to the prognosis of their particular neoplastic  "process.          NCCN Guidelines:     I discussed the available treatment option(s) in accordance with the latest literature from the NCCN Clinical Practice Guidelines for the patient's particular type of cancer disorder. The NCCN Guidelines provide a "document evidence-based (and) consensus-driven management" of the care of oncology patients. The treatment recommendations were made not only in accordance to the NCCN guidelines, but also factored in to account the patient's overall age, condition, performance status and their medical co-morbidities. I went over the risks and benefits of the the treatment options (if any could be made) with regard to their particular cancer type, their cancer stage, their age, and their co-morbidities.         Chemotherapy Discussion:      I discussed the available treatment option(s) in accordance with the latest/current national evidence-based guidelines (NCCN, UpToDate, NCI, ASCO, etc where applicable), their overall age/condition and their co-morbidities. I also went over the risks and benefits of the chemotherapy with regard to their particular cancer type, their cancer stage, their age/condition, and their co-morbidities. I provided literature on the chemotherapy regimen and discussed the chemotherapy side-effect profiles of the drug(s). I discussed the importance of compliance with obtaining and monitoring weekly lab work, and went over the potential hematopathology issues and risks with anemia, leucopenia and thrombocytopenia that can occur with chemotherapy. I discussed the potential risks of liver and kidney damage, which could be permanent and could necessitate dialysis long-term if kidney failure developed. I discussed the emetic and/or diarrheal potential of the regimen and the potential need for use of antiemetic and anti-diarrheal medications. I discussed the risk for development of anaphylactic shock, bronchospasm, dysrhythmia, and respiratory/cardiovascular arrest " and/or failure. I discussed the potential risks for development of alopecia, cold sensory issues, ringing in ears, vertigo, cataracts, glaucoma, and neuropathy, all of which could end up being chronic and life-long. Some chemotherpyI discussed the risks of hand-foot syndrome and rashes, and development of other autoimmune mediated processes such as pneumonitis, hepatitis, and colitis which could be life threatening. I discussed the risks of the potential development of a rare but fatal viral mediated disease known as PML (Progressive Multifocal Leukoencephalopathy), and risk of future development of leukemia and/or lymphoma from use of certain chemotherapy agents. I discussed the need for neutropenic precautions, basic hygiene/sanitation behaviors and dietary restrictions.    The patient's consent has been obtained to proceed with the chemotherapy.The patient will be referred to Chemotherapy School /[facility] for training and education on chemotherapy, use of antiemetics and/or anti-diarrheals, use of NSAID's, potential chemotherapy side-effects, and any specific recommendations and precautions with the particular chemotherapy agents.      I answered all of the patient's (and family's, if applicable) questions to the best of my ability and to their complete satisfaction. The patient acknowledged full understanding of the risks, recommendations and plan(s).     I have explained and the patient understands all of  the current recommendation(s). I have answered all of their questions to the best of my ability and to their complete satisfaction.     I have explained all of the above in detail and the patient understands all of the current recommendation(s). I have answered all of their questions to the best of my ability and to their complete satisfaction.   The patient is to continue with the current management plan.            Electronically signed by Briana Carrillo NP

## 2025-07-07 ENCOUNTER — OFFICE VISIT (OUTPATIENT)
Dept: SURGICAL ONCOLOGY | Facility: CLINIC | Age: 75
End: 2025-07-07
Payer: MEDICARE

## 2025-07-07 VITALS — WEIGHT: 155 LBS | BODY MASS INDEX: 24.91 KG/M2 | HEIGHT: 66 IN

## 2025-07-07 DIAGNOSIS — C32.9 LARYNX CANCER: Primary | ICD-10-CM

## 2025-07-07 DIAGNOSIS — C01 MALIGNANT NEOPLASM OF BASE OF TONGUE: ICD-10-CM

## 2025-07-07 PROCEDURE — 99999 PR PBB SHADOW E&M-EST. PATIENT-LVL III: CPT | Mod: PBBFAC,,, | Performed by: OTOLARYNGOLOGY

## 2025-07-07 NOTE — PROGRESS NOTES
CC: Surveillance    Oncology History   Larynx cancer   8/4/2020 Initial Diagnosis    Larynx neoplasm malignant     8/6/2020 Tumor Conference    His case was discussed at the Multidisciplinary Head and Neck Team Planning Meeting.    Representatives from Medical Oncology, Radiation Oncology, Head and Neck Surgical Oncology, Psychosocial Oncology, and Speech and Language Pathology discussed the case with the following recommendations:    1) definitive radiation              8/6/2020 Cancer Staged    Staging form: Larynx - Glottis, AJCC 8th Edition  - Clinical stage from 8/6/2020: Stage II (cT2, cN0, cM0)     8/6/2020 Cancer Staged    Cancer Staging  Larynx neoplasm malignant  Staging form: Larynx - Glottis, AJCC 8th Edition  - Clinical stage from 8/6/2020: Stage II (cT2, cN0, cM0) - Signed by Fariha Muñoz NP on 8/6/2020 12/16/2021 Tumor Conference    His case was discussed at the Multidisciplinary Head and Neck Team Planning Meeting.    Representatives from Medical Oncology, Radiation Oncology, Head and Neck Surgical Oncology, Psychosocial Oncology, and Speech and Language Pathology discussed the case with the following recommendations:    1) TL  2) oncology referral  3) psychology referral            12/27/2021 Surgery    1. DL And tracheostomy  2. PEG     1/6/2022 Surgery    1. Diagnostic direct laryngoscopy  2. Bilateral modified neck dissection of levels 2 through 4  3. Total laryngectomy  4. Total thyroidectomy with bilateral paratracheal/upper mediastinal neck dissection  5. Autotransplantation of left inferior parathyroid into left sternocleidomastoid muscle   6. Left anterolateral thigh free flap for closure of total laryngectomy neck skin defect  7. Advancement flap for closure of left thigh donor site advanced area was 25 x 10 cm     1/20/2022 Cancer Staged    Staging form: Larynx - Glottis, AJCC 8th Edition  - Pathologic stage from 1/20/2022: Stage LOU (pT4a, pN0, cM0)     5/30/2022 Cancer Staged     Staging form: Pharynx - P16 Negative Oropharynx, AJCC 8th Edition  - Clinical: Stage II (rcT2, cN0, cM0)     1/23/2023 -  Chemotherapy    Treatment Summary   Plan Name: OP HEAD NECK CISPLATIN WEEKLY + RADIOTHERAPY  Treatment Goal: Curative  Status: Active  Start Date: 1/23/2023  End Date: 3/6/2023  Provider: Venu Tamez MD  Chemotherapy: CISplatin (Platinol) 40 mg/m2 = 66 mg in sodium chloride 0.9% 631 mL chemo infusion, 40 mg/m2 = 66 mg, Intravenous, Clinic/HOD 1 time, 7 of 7 cycles  Administration: 66 mg (1/23/2023), 66 mg (2/13/2023), 66 mg (2/6/2023), 66 mg (2/20/2023), 66 mg (2/27/2023), 66 mg (3/6/2023)     Malignant neoplasm of base of tongue   5/20/2022 Cancer Staged    Staging form: Pharynx - P16 Negative Oropharynx, AJCC 8th Edition  - Clinical stage from 5/20/2022: Stage II (cT2, cN0, cM0, p16-)     6/22/2022 Initial Diagnosis    Primary cancer of base of tongue     7/11/2022 - 8/26/2022 Chemotherapy    Treatment Summary   Plan Name: OP HEAD NECK PEMBROLIZUMAB CARBOPLATIN FLUOROURACIL Q3W FOLLOWED BY MAINTENANCE PEMBROLIZUMAB 400 MG Q6W  Treatment Goal: Palliative  Status: Inactive  Start Date: 7/11/2022  End Date: 8/26/2022  Provider: Aurash Khoobehi, MD  Chemotherapy: CARBOplatin (PARAPLATIN) 440 mg in sodium chloride 0.9% 544 mL chemo infusion, 440 mg (100 % of original dose 438.5 mg), Intravenous, Clinic/HOD 1 time, 3 of 6 cycles  Dose modification:   (original dose 438.5 mg, Cycle 1)  Administration: 440 mg (7/11/2022), 435 mg (8/1/2022), 510 mg (8/22/2022)     1/23/2023 -  Chemotherapy    Treatment Summary   Plan Name: OP HEAD NECK CISPLATIN WEEKLY + RADIOTHERAPY  Treatment Goal: Curative  Status: Active  Start Date: 1/23/2023  End Date: 3/6/2023  Provider: Venu Tamez MD  Chemotherapy: CISplatin (Platinol) 40 mg/m2 = 66 mg in sodium chloride 0.9% 631 mL chemo infusion, 40 mg/m2 = 66 mg, Intravenous, [facility] 1 time, 7 of 7 cycles  Administration: 66 mg (1/23/2023), 66 mg  (2/13/2023), 66 mg (2/6/2023), 66 mg (2/20/2023), 66 mg (2/27/2023), 66 mg (3/6/2023)           HPI   74 y.o. male presents with the above treatment history.  No  new complaints except for excessive mucus.      Review of Systems   Constitutional: Negative for fatigue and unexpected weight change.   HENT: Per HPI.  Eyes: Negative for visual disturbance.   Respiratory: Negative for shortness of breath, hemoptysis   Cardiovascular: Negative for chest pain and palpitations.   Musculoskeletal: Negative for decreased ROM, back pain.   Skin: Negative for rash, sunburn, itching.   Neurological: Negative for dizziness and seizures.   Hematological: Negative for adenopathy. Does not bruise/bleed easily.   Endocrine: Negative for rapid weight loss/weight gain, heat/cold intolerance.     Past Medical History   Patient Active Problem List   Diagnosis    Melanocytic nevus    Body mass index (BMI) of 29.0-29.9 in adult    Benign hypertension    Overweight    Fatty liver disease, nonalcoholic    False positive serological test for hepatitis C    Hyperlipidemia    Type 2 diabetes mellitus with hyperglycemia, without long-term current use of insulin    Gastroesophageal reflux disease without esophagitis    Type 2 diabetes mellitus with hyperglycemia    Vitamin B12 deficiency    Hyperuricemia    Hypovitaminosis D    Proteinuria    On enteral nutrition    Larynx cancer    Dehydration    Adverse effect of adrenal cortical steroids, sequela    S/P laryngectomy    Hypocalcemia    Aphonia    Dysphagia, pharyngoesophageal    Acute pain of both shoulders    Bilateral arm weakness    Oral bleeding    Malignant neoplasm of base of tongue    Advanced care planning/counseling discussion    Iron deficiency anemia due to chronic blood loss    Postoperative hypothyroidism    Pressure injury of sacral region, stage 1    Pneumatosis intestinalis    Nausea and vomiting    Neutropenia    Abnormal CT scan, neck    Open wound of neck    Open wnd of  pharynx    Open wound of left thigh    Open wound of mouth    Tracheostomy in place    Type 2 diabetes mellitus, without long-term current use of insulin    Laryngeal cancer    Hypocalcemia    Hyperbilirubinemia    Elevated transaminase level    Hyponatremia    PEG (percutaneous endoscopic gastrostomy) adjustment/replacement/removal    Hypothyroidism    Dehydration    Pharyngocutaneous fistula    Elevated alkaline phosphatase level    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    Scar conditions and fibrosis of skin    Decreased ROM of neck    Iron deficiency anemia    Phantom pain    Chronic low back pain    Palliative care by specialist    Cancer related pain    S/P percutaneous endoscopic gastrostomy (PEG) tube placement    Secondary malignant neoplasm of other specified sites    Postprocedural hypoparathyroidism    Aortic atherosclerosis    Breast pain, right    Lesion of right nipple    Gynecomastia    Nipple soreness           Past Surgical History   Past Surgical History:   Procedure Laterality Date    CATARACT EXTRACTION W/  INTRAOCULAR LENS IMPLANT Right 8/16/2023    Procedure: CEIOL OD  NPO-peg;  Surgeon: Stoney Munoz MD;  Location: [facility] OR;  Service: Ophthalmology;  Laterality: Right;    CATARACT EXTRACTION W/  INTRAOCULAR LENS IMPLANT Left 10/18/2023    Procedure: CEIOL OS npo peg;  Surgeon: Stoney Munoz MD;  Location: [facility] OR;  Service: Ophthalmology;  Laterality: Left;    DIRECT LARYNGOBRONCHOSCOPY N/A 12/27/2021    Procedure: LARYNGOSCOPY, DIRECT, WITH BRONCHOSCOPY;  Surgeon: Flex Espinosa MD;  Location: [address];  Service: ENT;  Laterality: N/A;    DIRECT LARYNGOSCOPY  11/9/2022    Procedure: LARYNGOSCOPY, DIRECT;  Surgeon: Jesse James MD;  Location: [facility] OR [address] [place] [address];  Service: ENT;;    DISSECTION OF NECK Bilateral 1/6/2022    Procedure: DISSECTION, NECK;  Surgeon: Jesse James MD;  Location: [facility] OR [address] [place] [address];  Service: ENT;  Laterality: Bilateral;    DISSECTION OF NECK  Bilateral 11/9/2022    Procedure: DISSECTION, NECK;  Surgeon: Jesse James MD;  Location: [facility] OR 2ND FLR;  Service: ENT;  Laterality: Bilateral;    ESOPHAGOGASTRODUODENOSCOPY N/A 4/9/2024    Procedure: EGD (ESOPHAGOGASTRODUODENOSCOPY);  Surgeon: Matheus Cooper III, MD;  Location: [facility] ENDO;  Service: Endoscopy;  Laterality: N/A;    FLAP PROCEDURE Right 1/6/2022    Procedure: CREATION, FREE FLAP;  Surgeon: Alise Hart MD;  Location: [facility] OR [facility] FLR;  Service: ENT;  Laterality: Right;  Ischemic start 1351  Ischemic stop 1502    FLAP PROCEDURE Left 11/9/2022    Procedure: CREATION, FREE FLAP, ALT;  Surgeon: Alise Hart MD;  Location: [facility] OR 2ND FLR;  Service: ENT;  Laterality: Left;    GLOSSECTOMY Bilateral 11/9/2022    Procedure: TOTAL GLOSSECTOMY;  Surgeon: Jesse James MD;  Location: [facility] OR [facility] FLR;  Service: ENT;  Laterality: Bilateral;    INSERTION OF TUNNELED CENTRAL VENOUS CATHETER (CVC) WITH SUBCUTANEOUS PORT N/A 6/9/2022    Procedure: [id number];  Surgeon: Jesus Viera MD;  Location: [facility] OR;  Service: General;  Laterality: N/A;    INSERTION OF TUNNELED CENTRAL VENOUS CATHETER (CVC) WITH SUBCUTANEOUS PORT Right 1/12/2023    Procedure: [id number];  Surgeon: Abdulaziz Le Jr., MD;  Location: [facility] OR;  Service: General;  Laterality: Right;    LARYNGECTOMY N/A 1/6/2022    Procedure: LARYNGECTOMY;  Surgeon: Jesse James MD;  [facility];  Service: ENT;  Laterality: N/A;    LARYNGOSCOPY N/A 8/4/2020    Procedure: Suspension microlaryngoscopy with biopsy, possible KTP laser treatment/excision;  Surgeon: Stew Noel MD;  Location: [facility] OR 2ND FLR;  Service: ENT;  Laterality: N/A;  Microscope, telescopes, tower, microinstruments, KTP laser, rep conf# [id number]  7/28.    LARYNGOSCOPY N/A 3/16/2021    Procedure: Suspension microlaryngoscopy with excision of lesion, possible CO2 laser;  Surgeon: Stew Noel MD;  Location:  [facility] OR 2ND FLR;  Service: ENT;  Laterality: N/A;  Microscope, telescopes, tower, microinstruments, CO2 laser, rep conf# [id number] IC 3/4.    LARYNGOSCOPY N/A 4/1/2021    Procedure: Suspension microlaryngoscopy with KTP laser excision of lesion;  Surgeon: Stew Noel MD;  [facility];  Service: ENT;  Laterality: N/A;  Microscope, telescopes, tower, microinstruments, 70 degree scope, vocal fold , KTP laser, rep conf# [id number] BC    LARYNGOSCOPY N/A 12/9/2021    Procedure: Suspension microlaryngoscopy with biopsy;  Surgeon: Stew Noel MD;  [facility];  Service: ENT;  Laterality: N/A;  Microscope, telescopes, tower, microinstruments    LARYNGOSCOPY N/A 1/6/2022    Procedure: LARYNGOSCOPY;  Surgeon: Jesse James MD;  [facility];  Service: ENT;  Laterality: N/A;    LARYNGOSCOPY N/A 4/27/2022    Procedure: LARYNGOSCOPY WITH BIOPSY;  Surgeon: Jesse James MD;  [facility];  Service: ENT;  Laterality: N/A;    MICROLARYNGOSCOPY N/A 3/17/2020    Procedure: MICROLARYNGOSCOPY;  Surgeon: Jung Xiao MD;  Location: [facility] OR;  Service: ENT;  Laterality: N/A;  Laser Microlaryngoscopy  NEED TO SCHEDULE LASER from [facility] [id number]    PHARYNGECTOMY  11/9/2022    Procedure: TOTAL PHARYNGECTOMY;  Surgeon: Jesse James MD;  [facility];  Service: ENT;;    REIMPLANTATION OF PARATHYROID TISSUE N/A 1/6/2022    Procedure: REIMPLANTATION, PARATHYROID TISSUE;  Surgeon: Jesse James MD;  [facility];  Service: ENT;  Laterality: N/A;    SKIN SPLIT GRAFT Right 11/9/2022    Procedure: APPLICATION, GRAFT, SKIN, SPLIT-THICKNESS;  Surgeon: Jesse James MD;  [facility];  Service: ENT;  Laterality: Right;    THYROIDECTOMY  1/6/2022    Procedure: THYROIDECTOMY;  Surgeon: Jesse James MD;  Location: [facility] OR [address] [place] [address];  Service: ENT;;    TRACHEOSTOMY N/A 12/27/2021     Procedure: CREATION, TRACHEOSTOMY;  Surgeon: Flex Espinosa MD;  Location: [facility] OR [address] [place] [address];  Service: ENT;  Laterality: N/A;         Family History   Family History   Problem Relation Name Age of Onset    Abnormal EKG Mother Hayley     Diabetes Father Nicolás     Heart disease Father Nicolás     Hypertension Father Nicolás            Social History   .  Social History     Socioeconomic History    Marital status:      Spouse name: Janel Batres    Number of children: 2   Occupational History    Occupation: [employer]     Employer: [employer]   Tobacco Use    Smoking status: Never    Smokeless tobacco: Never   Substance and Sexual Activity    Alcohol use: Not Currently     Comment: occasional    Drug use: No    Sexual activity: Yes     Partners: Female   Social History Narrative    ** Merged History Encounter **         2 children from his 1st wife     Social Drivers of Health     Financial Resource Strain: Low Risk  (6/10/2025)    Overall Financial Resource Strain (CARDIA)     Difficulty of Paying Living Expenses: Not hard at all   Food Insecurity: No Food Insecurity (6/10/2025)    Hunger Vital Sign     Worried About Running Out of Food in the Last Year: Never true     Ran Out of Food in the Last Year: Never true   Transportation Needs: No Transportation Needs (6/10/2025)    PRAPARE - Transportation     Lack of Transportation (Medical): No     Lack of Transportation (Non-Medical): No   Physical Activity: Sufficiently Active (6/10/2025)    Exercise Vital Sign     Days of Exercise per Week: 7 days     Minutes of Exercise per Session: 120 min   Stress: No Stress Concern Present (6/10/2025)    [place] [employer] - Occupational Stress Questionnaire     Feeling of Stress : Not at all   Housing Stability: Low Risk  (6/10/2025)    Housing Stability Vital Sign     Unable to Pay for Housing in the Last Year: No     Number of Times Moved in the Last Year: 0     Homeless in the Last Year: No          Allergies   Review of patient's allergies indicates:   Allergen Reactions    Lovastatin Itching    Pollen extracts     Lovastatin Rash     Not confirmed but pt skeptical           Physical Exam     There were no vitals filed for this visit.            Body mass index is 25.01 kg/m².      General: AOx3, NAD   Respiratory:  Symmetric chest rise, normal effort  Oral Cavity:  Flap healthy. No worrisome lesions. Moderate trismus.  Oropharynx:  No masses/lesions of the posterior pharyngeal wall. Tonsillar fossa without lesions. Soft palate without masses. Midline uvula.   Neck: Stoma widely patent. Skin paddle healthy with good color and turgor.Well-healed neck incisions.No LAD. Moderate post-op, post-treatment fibrosis. Submental neck diffusely full.  Face: House Brackmann I bilaterally.    Scope via OC: No lesions of neopharynx, trachea clear.      Assessment/Plan  Problem List Items Addressed This Visit          Oncology    Larynx cancer - Primary    No evidence of disease.  Return in 4 months.         Malignant neoplasm of base of tongue

## 2025-07-10 ENCOUNTER — PATIENT MESSAGE (OUTPATIENT)
Dept: OTOLARYNGOLOGY | Facility: CLINIC | Age: 75
End: 2025-07-10
Payer: MEDICARE

## 2025-07-11 DIAGNOSIS — C79.89 SECONDARY MALIGNANT NEOPLASM OF OTHER SPECIFIED SITES: ICD-10-CM

## 2025-07-11 DIAGNOSIS — C01 MALIGNANT NEOPLASM OF BASE OF TONGUE: ICD-10-CM

## 2025-07-11 DIAGNOSIS — K11.7 SIALORRHEA: Primary | ICD-10-CM

## 2025-07-22 ENCOUNTER — PROCEDURE VISIT (OUTPATIENT)
Dept: OTOLARYNGOLOGY | Facility: CLINIC | Age: 75
End: 2025-07-22
Payer: MEDICARE

## 2025-07-22 DIAGNOSIS — K11.7 SIALORRHEA: Primary | ICD-10-CM

## 2025-07-22 PROCEDURE — 99499 UNLISTED E&M SERVICE: CPT | Mod: S$GLB,,, | Performed by: OTOLARYNGOLOGY

## 2025-07-22 RX ORDER — GLYCOPYRROLATE 1 MG/1
1 TABLET ORAL 3 TIMES DAILY
Qty: 90 TABLET | Refills: 0 | Status: SHIPPED | OUTPATIENT
Start: 2025-07-22 | End: 2025-08-23

## 2025-07-22 NOTE — PROGRESS NOTES
Cyrus Batres Jr. for Botox injection into bilateral parotid glands for assistance with excessive saliva.  No complaints.      Procedure:  Bilateral parotid sites prepped with alcohol.  50 units of Botox injected into both parotid glands. He tolerated the procedure well.    Will add glycopyrrolate as well    Return as scheduled for surveillance.

## 2025-07-29 ENCOUNTER — INFUSION (OUTPATIENT)
Dept: INFUSION THERAPY | Facility: HOSPITAL | Age: 75
End: 2025-07-29
Attending: INTERNAL MEDICINE
Payer: MEDICARE

## 2025-07-29 VITALS
HEIGHT: 66 IN | WEIGHT: 155 LBS | BODY MASS INDEX: 24.91 KG/M2 | DIASTOLIC BLOOD PRESSURE: 69 MMHG | RESPIRATION RATE: 18 BRPM | TEMPERATURE: 97 F | HEART RATE: 60 BPM | SYSTOLIC BLOOD PRESSURE: 119 MMHG | OXYGEN SATURATION: 98 %

## 2025-07-29 DIAGNOSIS — C01 MALIGNANT NEOPLASM OF BASE OF TONGUE: ICD-10-CM

## 2025-07-29 DIAGNOSIS — E86.0 DEHYDRATION: ICD-10-CM

## 2025-07-29 DIAGNOSIS — C32.9 LARYNX CANCER: Primary | ICD-10-CM

## 2025-07-29 PROCEDURE — A4216 STERILE WATER/SALINE, 10 ML: HCPCS | Performed by: INTERNAL MEDICINE

## 2025-07-29 PROCEDURE — 63600175 PHARM REV CODE 636 W HCPCS: Performed by: INTERNAL MEDICINE

## 2025-07-29 PROCEDURE — 25000003 PHARM REV CODE 250: Performed by: INTERNAL MEDICINE

## 2025-07-29 PROCEDURE — 96523 IRRIG DRUG DELIVERY DEVICE: CPT

## 2025-07-29 RX ORDER — HEPARIN 100 UNIT/ML
500 SYRINGE INTRAVENOUS
Status: DISCONTINUED | OUTPATIENT
Start: 2025-07-29 | End: 2025-07-29 | Stop reason: HOSPADM

## 2025-07-29 RX ORDER — SODIUM CHLORIDE 0.9 % (FLUSH) 0.9 %
10 SYRINGE (ML) INJECTION
Status: DISCONTINUED | OUTPATIENT
Start: 2025-07-29 | End: 2025-07-29 | Stop reason: HOSPADM

## 2025-07-29 RX ORDER — SODIUM CHLORIDE 0.9 % (FLUSH) 0.9 %
10 SYRINGE (ML) INJECTION
OUTPATIENT
Start: 2025-07-29

## 2025-07-29 RX ORDER — HEPARIN 100 UNIT/ML
500 SYRINGE INTRAVENOUS
OUTPATIENT
Start: 2025-07-29

## 2025-07-29 RX ADMIN — HEPARIN 500 UNITS: 100 SYRINGE at 02:07

## 2025-07-29 RX ADMIN — SODIUM CHLORIDE, PRESERVATIVE FREE 10 ML: 5 INJECTION INTRAVENOUS at 02:07

## 2025-07-29 NOTE — PLAN OF CARE
Problem: Infection  Goal: Absence of Infection Signs and Symptoms  Outcome: Progressing  Intervention: Prevent or Manage Infection  Flowsheets (Taken 7/29/2025 2958)  Fever Reduction/Comfort Measures: fluid intake increased  Infection Management: aseptic technique maintained  Isolation Precautions: precautions initiated

## 2025-08-29 ENCOUNTER — PATIENT MESSAGE (OUTPATIENT)
Dept: FAMILY MEDICINE | Facility: CLINIC | Age: 75
End: 2025-08-29
Payer: MEDICARE

## 2025-09-01 RX ORDER — BENZONATATE 100 MG/1
100 CAPSULE ORAL 3 TIMES DAILY PRN
Qty: 90 CAPSULE | Refills: 5 | Status: SHIPPED | OUTPATIENT
Start: 2025-09-01 | End: 2026-02-28

## 2025-09-01 RX ORDER — BENZONATATE 100 MG/1
100 CAPSULE ORAL 3 TIMES DAILY PRN
COMMUNITY
End: 2025-09-01 | Stop reason: SDUPTHER

## (undated) DEVICE — TRAY GENERAL LAPAROSCOPY

## (undated) DEVICE — ADHESIVE DERMABOND ADVANCED

## (undated) DEVICE — SUT 2-0 SILK 30IN BLK BRAID

## (undated) DEVICE — DRAPE LAPAROTOMY TRANSVERSE 89281

## (undated) DEVICE — CLIP MED TICALL

## (undated) DEVICE — SOL BETADINE 5%

## (undated) DEVICE — DRAIN CHANNEL ROUND 15FR

## (undated) DEVICE — Device

## (undated) DEVICE — SEE MEDLINE ITEM 146292

## (undated) DEVICE — SUT SILK 4-0 RB-1 30 BL BR

## (undated) DEVICE — ELECTRODE REM PLYHSV RETURN 9

## (undated) DEVICE — PROBE FLOW DOPPLER

## (undated) DEVICE — KIT URINARY CATH URINE METER

## (undated) DEVICE — SUT MCRYL PLUS 4-0 PS2 27IN

## (undated) DEVICE — TAPE CLOTH 3 MEDIPORE 2863

## (undated) DEVICE — SKINMARKER & RULER REGULAR X-F

## (undated) DEVICE — DISSECTOR LIGASURE EXACT 21CM

## (undated) DEVICE — CATH IV INTROCAN 20G X 1.1

## (undated) DEVICE — SEE MEDLINE ITEM 157131

## (undated) DEVICE — SUT LIGACLIP SMALL XTRA

## (undated) DEVICE — TRAY FOLEY 16FR INFECTION CONT

## (undated) DEVICE — TIP BOVIE TEFLON E1450X

## (undated) DEVICE — SUTURE MONOCRYL 4-0 27 SH MCP415H

## (undated) DEVICE — KIT ANTIFOG

## (undated) DEVICE — HOOK LONE STAR BLUNT 12MM

## (undated) DEVICE — TRAY ENT 4/CS

## (undated) DEVICE — STAPLER SKIN PROXIMATE WIDE

## (undated) DEVICE — CONTAINER SPECIMEN STRL 4OZ

## (undated) DEVICE — SUTURE VICRYL 2-0 27 SH VCP417H

## (undated) DEVICE — ELECTRODE BLADE INSULATED 1 IN

## (undated) DEVICE — SYS LABEL CORRECT MED

## (undated) DEVICE — SOL IRR STRL WATER 500ML

## (undated) DEVICE — DRESSING TRANS 6X8 TEGADERM

## (undated) DEVICE — DRESSING TEGADERM 4X4 3/4 TD1004

## (undated) DEVICE — PACK UNIVERSAL SPLIT II

## (undated) DEVICE — SPONGE LAP 18X18 PREWASHED

## (undated) DEVICE — SHEET EENT SPLIT

## (undated) DEVICE — DEKA CO2 LASER

## (undated) DEVICE — DRESSING TELFA STRL 4X3 LF

## (undated) DEVICE — GLOVE SURG ULTRA TOUCH 8

## (undated) DEVICE — GLOVE BIOGEL PI ULTRA TOUCH GRAY SZ7.5

## (undated) DEVICE — CLAMP SINGLE MICRO.

## (undated) DEVICE — UNDERGLOVE BIOGEL PI MICRO BLUE SZ 8

## (undated) DEVICE — DEKA HISCAN SCANNER

## (undated) DEVICE — SEE MEDLINE ITEM 157194

## (undated) DEVICE — SUT 3-0 12-18IN SILK

## (undated) DEVICE — BAG DECANTER 10-102

## (undated) DEVICE — SPONGE PATTY SURGICAL .5X3IN

## (undated) DEVICE — GLOVE SURG ULTRA TOUCH 7.5

## (undated) DEVICE — GOWN SURGICAL X-LARGE

## (undated) DEVICE — GLOVE SENSICARE PI ALOE 7.5

## (undated) DEVICE — SUT NYLON BLK MONO 9-0 5

## (undated) DEVICE — DRESSING ADH ISLAND 3.6 X 14

## (undated) DEVICE — SEE MEDLINE ITEM 146373

## (undated) DEVICE — SYR SLIP TIP 1CC

## (undated) DEVICE — GLOVE BIOGEL MICRO SURGEON PINK SZ 7.5

## (undated) DEVICE — SOL WATER STRL IRR 1000ML

## (undated) DEVICE — PAD BOVIE ADULT

## (undated) DEVICE — SUT SILK 2-0 SH 18IN BLACK

## (undated) DEVICE — GAUZE SPONGE PEANUT STRL

## (undated) DEVICE — EVACUATOR WOUND BULB 100CC

## (undated) DEVICE — SEE MEDLINE ITEM 154981

## (undated) DEVICE — SUT 8/0 5IN ETHILON BLK MO

## (undated) DEVICE — SPONGE WEC CEL SPEARS

## (undated) DEVICE — CABLE EXT DOPPLER FLOW PROBE

## (undated) DEVICE — DRAPE OPTHALMIC W/POUCH

## (undated) DEVICE — FIBER KTP YAG 600 MICRON

## (undated) DEVICE — RENTAL KTP LASER

## (undated) DEVICE — SET DECANTER MEDICHOICE

## (undated) DEVICE — CORD BIPOLAR 12 FOOT

## (undated) DEVICE — SUT 4/0 18IN GUT PLAINPS-2

## (undated) DEVICE — BLADE SURG BVL ANG COAX 2.4MM

## (undated) DEVICE — KNIFE ANGLE 1MM

## (undated) DEVICE — SHEARS COAGULATING 5MMX36CM   HAR36

## (undated) DEVICE — PACK CUSTOM EYE KIT

## (undated) DEVICE — SYRINGE 30ML 302832

## (undated) DEVICE — TRACH TUBE CUFF FLEX DISP 7.5

## (undated) DEVICE — PROBE CATH TEMP 16 FRFOLEY 400

## (undated) DEVICE — SUT COATED VICRYL 4/0 27IN

## (undated) DEVICE — TROCAR BALL. BLNT HASSON C0R47

## (undated) DEVICE — GLOVE SURG ULTRA TOUCH 7

## (undated) DEVICE — SUTURE VICRYL 3-0 SH 27 VCP416H

## (undated) DEVICE — KIT MEROCEL INSTRUMENT WIPE

## (undated) DEVICE — SUT SILK 3-0 RB-1 30IN BLK

## (undated) DEVICE — TOWEL OR DISP STRL BLUE 4/PK

## (undated) DEVICE — SEE MEDLINE ITEM 146313

## (undated) DEVICE — SEE L#120831

## (undated) DEVICE — TROCAR ADVANCED FIXATION  CFF03

## (undated) DEVICE — DRAIN CHANNEL ROUND 19FR

## (undated) DEVICE — SYRINGE 10ML 302995

## (undated) DEVICE — DRESSING MEPORE ADH 3.5X12

## (undated) DEVICE — DRAPE T THYROID W/ARMBOARD COVER

## (undated) DEVICE — INSTRUMENT SURG SUCT FRZR W/C

## (undated) DEVICE — SUT SILK 2-0 PS 18IN BLACK

## (undated) DEVICE — SPONGE DRAIN 4X4 441407

## (undated) DEVICE — HANDLE MEDIVAC SUC YANK BLBOUS

## (undated) DEVICE — CUP MEDICINE GRADUATED 1OZ

## (undated) DEVICE — BOOT AIR FLUID HEEL ADLT STD

## (undated) DEVICE — BLADE SCALPEL #11 371111

## (undated) DEVICE — TIP I & A

## (undated) DEVICE — TRAY MINOR GEN SURG OMC

## (undated) DEVICE — CYSTOTOME IRRIG 24G 13MM

## (undated) DEVICE — SUT SILK 3-0 SH 18IN BLACK

## (undated) DEVICE — TRAY SKIN SCRUB WET PREMIUM

## (undated) DEVICE — KIT PEG PULL STANDARD 20F

## (undated) DEVICE — SUT 9/0 5IN ETHILON BLK MON

## (undated) DEVICE — SYR 50ML CATH TIP

## (undated) DEVICE — SEE MEDLINE ITEM 153688

## (undated) DEVICE — PREP CHLORA 10.5ML ORANGE

## (undated) DEVICE — SEE L#133928

## (undated) DEVICE — SHIELD EYE PLASTIC 3100G

## (undated) DEVICE — ADHESIVE DERMABOND SKIN DNX12

## (undated) DEVICE — DRAPE TOP 53X102IN

## (undated) DEVICE — SOL NS 1000CC

## (undated) DEVICE — DRAPE C-ARM (FITS NEW C-ARM) 07-CA108

## (undated) DEVICE — NDL HYPO REG 25G X 1 1/2

## (undated) DEVICE — SUT VICRYL PLUS 3-0 SH 18IN

## (undated) DEVICE — BACITRACIN ZINC OINT 0.9GM

## (undated) DEVICE — SUT PROLENE 6-0 C-1 30IN BL

## (undated) DEVICE — KIT SAHARA DRAPE DRAW/LIFT

## (undated) DEVICE — NEEDLE SAFETY ECLIPSE 25G 1-1/2IN 305767

## (undated) DEVICE — PAD K-THERMIA 24IN X 60IN

## (undated) DEVICE — SOLUTION IRRI NS BOTTLE 1000ML R5200-01

## (undated) DEVICE — CATH IV INTROCAN 22G X 1

## (undated) DEVICE — SUT 2-0 VICRYL / CT-1

## (undated) DEVICE — SUT VICRYL 3-0 27 SH

## (undated) DEVICE — DRESSING AQUACEL SACRAL 9 X 9

## (undated) DEVICE — SUTURE SILK 3-0 18 A184H

## (undated) DEVICE — DRAPE STERI INSTRUMENT 1018

## (undated) DEVICE — STERISTRIP 1/2 R1547

## (undated) DEVICE — KIT ANTIFOG W/SPONG & FLUID

## (undated) DEVICE — SUT ETHILON 3-0 PS2 18 BLK

## (undated) DEVICE — CLIP DOUBLE MICRO.

## (undated) DEVICE — SCISSORS 5MM APPLIED MEDICAL   CB030

## (undated) DEVICE — HANDPIECE KTP

## (undated) DEVICE — SUTURE PROLENE 2-0 SH 36 8523H

## (undated) DEVICE — KIT EVACUATOR 3-SPRING 1/8 DRN

## (undated) DEVICE — MICRO CLIP

## (undated) DEVICE — DRESSING SURGICAL 1/2X1/2

## (undated) DEVICE — COVER LIGHT HANDLE 80/CA

## (undated) DEVICE — DRAPE OPMI STERILE

## (undated) DEVICE — GUIDE MICRO-GRID SIL GRN

## (undated) DEVICE — SHEET DRAPE MEDIUM

## (undated) DEVICE — SUT 2-0 12-18IN SILK

## (undated) DEVICE — SYR 3CC LUER LOC

## (undated) DEVICE — DEKA MICROMANIPULATOR

## (undated) DEVICE — SUT 3/0 54IN PLAIN GUT

## (undated) DEVICE — DRAPE EENT SPLIT STERILE

## (undated) DEVICE — TRAY GENERAL SURGERY

## (undated) DEVICE — SPONGE GAUZE 10S 4X4  442214

## (undated) DEVICE — SHEARS HARMONIC CRVD 9 CM

## (undated) DEVICE — SYRINGE 5ML 309646

## (undated) DEVICE — ADHESIVE MASTISOL VIAL 48/BX

## (undated) DEVICE — DRESSING GAUZE XEROFORM 5X9

## (undated) DEVICE — TUBING SUCTION 3/16X6 2 CONN

## (undated) DEVICE — NDL BOX COUNTER

## (undated) DEVICE — GLOVE BIOGEL PI  GOLD SZ 7

## (undated) DEVICE — SUTURE VICRYL #0 27 UR-6 VCP603H

## (undated) DEVICE — JELLY LUBRICATING TUBE 4OZ 4OZLUB

## (undated) DEVICE — GOWN SURGICAL BASICS XL

## (undated) DEVICE — SUCTION SURGICAL FRAZR

## (undated) DEVICE — SUT 2/0 30IN SILK BLK BRAI

## (undated) DEVICE — SUTURE VICRYL 3-0 18 SH VCP864D

## (undated) DEVICE — SEE L#95700

## (undated) DEVICE — DRESSING TELFA 3X8  1238

## (undated) DEVICE — SUTURE MONOCRYL 4-0 PS-2 27 MCP426H

## (undated) DEVICE — TRAY MINOR GEN SURG

## (undated) DEVICE — LUBRICANT SURGILUBE 2 OZ

## (undated) DEVICE — BLADE SURG #15 CARBON STEEL

## (undated) DEVICE — NEEDLE SAFETY ECLIPSE 21G 1IN 305764

## (undated) DEVICE — SEE MEDLINE ITEM 157128

## (undated) DEVICE — SUT 4-0 VICRYL / SH